# Patient Record
Sex: MALE | Race: ASIAN | NOT HISPANIC OR LATINO
[De-identification: names, ages, dates, MRNs, and addresses within clinical notes are randomized per-mention and may not be internally consistent; named-entity substitution may affect disease eponyms.]

---

## 2017-01-12 ENCOUNTER — APPOINTMENT (OUTPATIENT)
Dept: NEPHROLOGY | Facility: CLINIC | Age: 62
End: 2017-01-12

## 2017-01-12 VITALS
OXYGEN SATURATION: 98 % | DIASTOLIC BLOOD PRESSURE: 77 MMHG | WEIGHT: 167.71 LBS | SYSTOLIC BLOOD PRESSURE: 138 MMHG | HEART RATE: 66 BPM | BODY MASS INDEX: 24.84 KG/M2 | HEIGHT: 69 IN

## 2017-01-13 LAB
ALBUMIN SERPL ELPH-MCNC: 4 G/DL
ANION GAP SERPL CALC-SCNC: 19 MMOL/L
APPEARANCE: CLEAR
BACTERIA: NEGATIVE
BASOPHILS # BLD AUTO: 0.03 K/UL
BASOPHILS NFR BLD AUTO: 0.3 %
BILIRUBIN URINE: NEGATIVE
BLOOD URINE: NEGATIVE
BUN SERPL-MCNC: 61 MG/DL
CALCIUM SERPL-MCNC: 8.1 MG/DL
CHLORIDE SERPL-SCNC: 100 MMOL/L
CO2 SERPL-SCNC: 19 MMOL/L
COLOR: YELLOW
CREAT SERPL-MCNC: 4.39 MG/DL
CREAT SPEC-SCNC: 79 MG/DL
CREAT/PROT UR: 0.8 RATIO
EOSINOPHIL # BLD AUTO: 0.33 K/UL
EOSINOPHIL NFR BLD AUTO: 3.8 %
GLUCOSE QUALITATIVE U: NORMAL MG/DL
GLUCOSE SERPL-MCNC: 115 MG/DL
HCT VFR BLD CALC: 32.9 %
HGB BLD-MCNC: 10.3 G/DL
HYALINE CASTS: 1 /LPF
IMM GRANULOCYTES NFR BLD AUTO: 0.2 %
KETONES URINE: NEGATIVE
LEUKOCYTE ESTERASE URINE: NEGATIVE
LYMPHOCYTES # BLD AUTO: 3.06 K/UL
LYMPHOCYTES NFR BLD AUTO: 35.2 %
MAN DIFF?: NORMAL
MCHC RBC-ENTMCNC: 28.3 PG
MCHC RBC-ENTMCNC: 31.3 GM/DL
MCV RBC AUTO: 90.4 FL
MICROSCOPIC-UA: NORMAL
MONOCYTES # BLD AUTO: 0.55 K/UL
MONOCYTES NFR BLD AUTO: 6.3 %
NEUTROPHILS # BLD AUTO: 4.7 K/UL
NEUTROPHILS NFR BLD AUTO: 54.2 %
NITRITE URINE: NEGATIVE
PH URINE: 5.5
PHOSPHATE SERPL-MCNC: 4.3 MG/DL
PLATELET # BLD AUTO: 287 K/UL
POTASSIUM SERPL-SCNC: 4.3 MMOL/L
PROT UR-MCNC: 66 MG/DL
PROTEIN URINE: 30 MG/DL
RBC # BLD: 3.64 M/UL
RBC # FLD: 13.7 %
RED BLOOD CELLS URINE: 0 /HPF
SODIUM SERPL-SCNC: 138 MMOL/L
SPECIFIC GRAVITY URINE: 1.01
SQUAMOUS EPITHELIAL CELLS: 0 /HPF
UROBILINOGEN URINE: NORMAL MG/DL
WBC # FLD AUTO: 8.69 K/UL
WHITE BLOOD CELLS URINE: 0 /HPF

## 2017-02-23 ENCOUNTER — APPOINTMENT (OUTPATIENT)
Dept: CARDIOLOGY | Facility: CLINIC | Age: 62
End: 2017-02-23

## 2017-02-23 ENCOUNTER — NON-APPOINTMENT (OUTPATIENT)
Age: 62
End: 2017-02-23

## 2017-02-23 VITALS
OXYGEN SATURATION: 99 % | WEIGHT: 167 LBS | BODY MASS INDEX: 24.73 KG/M2 | TEMPERATURE: 97.9 F | DIASTOLIC BLOOD PRESSURE: 71 MMHG | SYSTOLIC BLOOD PRESSURE: 124 MMHG | RESPIRATION RATE: 12 BRPM | HEIGHT: 69 IN | HEART RATE: 66 BPM

## 2017-03-01 ENCOUNTER — OUTPATIENT (OUTPATIENT)
Dept: OUTPATIENT SERVICES | Facility: HOSPITAL | Age: 62
LOS: 1 days | End: 2017-03-01
Payer: COMMERCIAL

## 2017-03-01 ENCOUNTER — APPOINTMENT (OUTPATIENT)
Dept: CV DIAGNOSTICS | Facility: HOSPITAL | Age: 62
End: 2017-03-01

## 2017-03-01 DIAGNOSIS — I25.10 ATHEROSCLEROTIC HEART DISEASE OF NATIVE CORONARY ARTERY WITHOUT ANGINA PECTORIS: ICD-10-CM

## 2017-03-01 DIAGNOSIS — Z01.818 ENCOUNTER FOR OTHER PREPROCEDURAL EXAMINATION: ICD-10-CM

## 2017-03-01 PROCEDURE — 78452 HT MUSCLE IMAGE SPECT MULT: CPT | Mod: 26

## 2017-03-01 PROCEDURE — 93018 CV STRESS TEST I&R ONLY: CPT

## 2017-03-01 PROCEDURE — 93016 CV STRESS TEST SUPVJ ONLY: CPT

## 2017-03-09 ENCOUNTER — APPOINTMENT (OUTPATIENT)
Dept: NEPHROLOGY | Facility: CLINIC | Age: 62
End: 2017-03-09

## 2017-03-09 VITALS
HEIGHT: 69 IN | BODY MASS INDEX: 24.59 KG/M2 | OXYGEN SATURATION: 98 % | WEIGHT: 166 LBS | HEART RATE: 62 BPM | DIASTOLIC BLOOD PRESSURE: 78 MMHG | SYSTOLIC BLOOD PRESSURE: 160 MMHG

## 2017-03-13 ENCOUNTER — OUTPATIENT (OUTPATIENT)
Dept: OUTPATIENT SERVICES | Facility: HOSPITAL | Age: 62
LOS: 1 days | End: 2017-03-13
Payer: COMMERCIAL

## 2017-03-13 ENCOUNTER — LABORATORY RESULT (OUTPATIENT)
Age: 62
End: 2017-03-13

## 2017-03-13 ENCOUNTER — APPOINTMENT (OUTPATIENT)
Dept: TRANSPLANT | Facility: CLINIC | Age: 62
End: 2017-03-13

## 2017-03-13 ENCOUNTER — OUTPATIENT (OUTPATIENT)
Dept: OUTPATIENT SERVICES | Facility: HOSPITAL | Age: 62
LOS: 1 days | End: 2017-03-13

## 2017-03-13 ENCOUNTER — APPOINTMENT (OUTPATIENT)
Dept: NEPHROLOGY | Facility: CLINIC | Age: 62
End: 2017-03-13

## 2017-03-13 VITALS
WEIGHT: 165 LBS | SYSTOLIC BLOOD PRESSURE: 116 MMHG | BODY MASS INDEX: 24.37 KG/M2 | DIASTOLIC BLOOD PRESSURE: 72 MMHG | HEART RATE: 76 BPM | TEMPERATURE: 97.9 F | RESPIRATION RATE: 16 BRPM

## 2017-03-13 VITALS
SYSTOLIC BLOOD PRESSURE: 116 MMHG | RESPIRATION RATE: 18 BRPM | BODY MASS INDEX: 24.37 KG/M2 | WEIGHT: 165 LBS | DIASTOLIC BLOOD PRESSURE: 72 MMHG | OXYGEN SATURATION: 98 % | HEART RATE: 76 BPM

## 2017-03-13 DIAGNOSIS — N18.6 END STAGE RENAL DISEASE: ICD-10-CM

## 2017-03-13 LAB
ALBUMIN SERPL ELPH-MCNC: 4 G/DL — SIGNIFICANT CHANGE UP (ref 3.3–5)
ALP SERPL-CCNC: 100 U/L — SIGNIFICANT CHANGE UP (ref 40–120)
ALT FLD-CCNC: 9 U/L — LOW (ref 10–45)
ANION GAP SERPL CALC-SCNC: 15 MMOL/L — SIGNIFICANT CHANGE UP (ref 5–17)
AST SERPL-CCNC: 11 U/L — SIGNIFICANT CHANGE UP (ref 10–40)
BASOPHILS # BLD AUTO: 0.04 K/UL — SIGNIFICANT CHANGE UP (ref 0–0.2)
BASOPHILS NFR BLD AUTO: 0.5 % — SIGNIFICANT CHANGE UP (ref 0–2)
BILIRUB SERPL-MCNC: 0.2 MG/DL — SIGNIFICANT CHANGE UP (ref 0.2–1.2)
BUN SERPL-MCNC: 56 MG/DL — HIGH (ref 7–23)
CALCIUM SERPL-MCNC: 9.1 MG/DL — SIGNIFICANT CHANGE UP (ref 8.4–10.5)
CHLORIDE SERPL-SCNC: 104 MMOL/L — SIGNIFICANT CHANGE UP (ref 96–108)
CHOLEST SERPL-MCNC: 146 MG/DL — SIGNIFICANT CHANGE UP (ref 10–199)
CMV IGG FLD QL: 8.7 U/ML — HIGH
CMV IGG SERPL-IMP: POSITIVE
CO2 SERPL-SCNC: 20 MMOL/L — LOW (ref 22–31)
CREAT SERPL-MCNC: 4.45 MG/DL — HIGH (ref 0.5–1.3)
EOSINOPHIL # BLD AUTO: 0.29 K/UL — SIGNIFICANT CHANGE UP (ref 0–0.5)
EOSINOPHIL NFR BLD AUTO: 3.8 % — SIGNIFICANT CHANGE UP (ref 0–6)
GLUCOSE SERPL-MCNC: 178 MG/DL — HIGH (ref 70–99)
HAV IGG+IGM SER QL: REACTIVE
HBA1C BLD-MCNC: 7 % — HIGH (ref 4–5.6)
HCT VFR BLD CALC: 32.6 % — LOW (ref 39–50)
HDLC SERPL-MCNC: 43 MG/DL — SIGNIFICANT CHANGE UP (ref 40–125)
HGB BLD-MCNC: 10 G/DL — LOW (ref 13–17)
HSV1 IGG SER-ACNC: >62.2 INDEX — HIGH
HSV1 IGG SERPL QL IA: POSITIVE
HSV2 IGG FLD-ACNC: 0.07 INDEX — SIGNIFICANT CHANGE UP
HSV2 IGG SERPL QL IA: NEGATIVE — SIGNIFICANT CHANGE UP
IMM GRANULOCYTES NFR BLD AUTO: 0.3 % — SIGNIFICANT CHANGE UP (ref 0–1.5)
LDH SERPL L TO P-CCNC: 201 U/L — SIGNIFICANT CHANGE UP (ref 50–242)
LIPID PNL WITH DIRECT LDL SERPL: 79 MG/DL — SIGNIFICANT CHANGE UP
LYMPHOCYTES # BLD AUTO: 2.44 K/UL — SIGNIFICANT CHANGE UP (ref 1–3.3)
LYMPHOCYTES # BLD AUTO: 31.7 % — SIGNIFICANT CHANGE UP (ref 13–44)
MAGNESIUM SERPL-MCNC: 2 MG/DL — SIGNIFICANT CHANGE UP (ref 1.6–2.6)
MCHC RBC-ENTMCNC: 27.9 PG — SIGNIFICANT CHANGE UP (ref 27–34)
MCHC RBC-ENTMCNC: 30.7 GM/DL — LOW (ref 32–36)
MCV RBC AUTO: 91.1 FL — SIGNIFICANT CHANGE UP (ref 80–100)
MONOCYTES # BLD AUTO: 0.57 K/UL — SIGNIFICANT CHANGE UP (ref 0–0.9)
MONOCYTES NFR BLD AUTO: 7.4 % — SIGNIFICANT CHANGE UP (ref 2–14)
NEUTROPHILS # BLD AUTO: 4.33 K/UL — SIGNIFICANT CHANGE UP (ref 1.8–7.4)
NEUTROPHILS NFR BLD AUTO: 56.3 % — SIGNIFICANT CHANGE UP (ref 43–77)
PHOSPHATE SERPL-MCNC: 3.2 MG/DL — SIGNIFICANT CHANGE UP (ref 2.5–4.5)
PLATELET # BLD AUTO: 307 K/UL — SIGNIFICANT CHANGE UP (ref 150–400)
POTASSIUM SERPL-MCNC: 5.3 MMOL/L — SIGNIFICANT CHANGE UP (ref 3.5–5.3)
POTASSIUM SERPL-SCNC: 5.3 MMOL/L — SIGNIFICANT CHANGE UP (ref 3.5–5.3)
PROT SERPL-MCNC: 7.5 G/DL — SIGNIFICANT CHANGE UP (ref 6–8.3)
PSA FLD-MCNC: 1.68 NG/ML — SIGNIFICANT CHANGE UP (ref 0–4)
RBC # BLD: 3.58 M/UL — LOW (ref 4.2–5.8)
RBC # FLD: 14.1 % — SIGNIFICANT CHANGE UP (ref 10.3–14.5)
RUBV IGG SER-ACNC: 5.2 INDEX — SIGNIFICANT CHANGE UP
RUBV IGG SER-IMP: POSITIVE — SIGNIFICANT CHANGE UP
SODIUM SERPL-SCNC: 139 MMOL/L — SIGNIFICANT CHANGE UP (ref 135–145)
T GONDII IGG SER QL: <3 IU/ML — SIGNIFICANT CHANGE UP
T GONDII IGG SER QL: NEGATIVE — SIGNIFICANT CHANGE UP
TOTAL CHOLESTEROL/HDL RATIO MEASUREMENT: 3.4 RATIO — SIGNIFICANT CHANGE UP (ref 3.4–9.6)
TRIGL SERPL-MCNC: 121 MG/DL — SIGNIFICANT CHANGE UP (ref 10–149)
TSH SERPL-MCNC: 1.59 UIU/ML — SIGNIFICANT CHANGE UP (ref 0.27–4.2)
VZV IGG FLD QL IA: 2397 INDEX — SIGNIFICANT CHANGE UP
VZV IGG FLD QL IA: POSITIVE — SIGNIFICANT CHANGE UP
WBC # BLD: 7.69 K/UL — SIGNIFICANT CHANGE UP (ref 3.8–10.5)
WBC # FLD AUTO: 7.69 K/UL — SIGNIFICANT CHANGE UP (ref 3.8–10.5)

## 2017-03-13 PROCEDURE — 86664 EPSTEIN-BARR NUCLEAR ANTIGEN: CPT

## 2017-03-13 PROCEDURE — 87340 HEPATITIS B SURFACE AG IA: CPT

## 2017-03-13 PROCEDURE — 86592 SYPHILIS TEST NON-TREP QUAL: CPT

## 2017-03-13 PROCEDURE — 86787 VARICELLA-ZOSTER ANTIBODY: CPT

## 2017-03-13 PROCEDURE — 86704 HEP B CORE ANTIBODY TOTAL: CPT

## 2017-03-13 PROCEDURE — 87389 HIV-1 AG W/HIV-1&-2 AB AG IA: CPT

## 2017-03-13 PROCEDURE — 83036 HEMOGLOBIN GLYCOSYLATED A1C: CPT

## 2017-03-13 PROCEDURE — 86706 HEP B SURFACE ANTIBODY: CPT

## 2017-03-13 PROCEDURE — 80061 LIPID PANEL: CPT

## 2017-03-13 PROCEDURE — 86803 HEPATITIS C AB TEST: CPT

## 2017-03-13 PROCEDURE — 84443 ASSAY THYROID STIM HORMONE: CPT

## 2017-03-13 PROCEDURE — 86762 RUBELLA ANTIBODY: CPT

## 2017-03-13 PROCEDURE — 83735 ASSAY OF MAGNESIUM: CPT

## 2017-03-13 PROCEDURE — 86665 EPSTEIN-BARR CAPSID VCA: CPT

## 2017-03-13 PROCEDURE — 80053 COMPREHEN METABOLIC PANEL: CPT

## 2017-03-13 PROCEDURE — 86695 HERPES SIMPLEX TYPE 1 TEST: CPT

## 2017-03-13 PROCEDURE — 84100 ASSAY OF PHOSPHORUS: CPT

## 2017-03-13 PROCEDURE — 86777 TOXOPLASMA ANTIBODY: CPT

## 2017-03-13 PROCEDURE — 86708 HEPATITIS A ANTIBODY: CPT

## 2017-03-13 PROCEDURE — 85027 COMPLETE CBC AUTOMATED: CPT

## 2017-03-13 PROCEDURE — 83615 LACTATE (LD) (LDH) ENZYME: CPT

## 2017-03-13 PROCEDURE — 86696 HERPES SIMPLEX TYPE 2 TEST: CPT

## 2017-03-13 PROCEDURE — 86644 CMV ANTIBODY: CPT

## 2017-03-13 PROCEDURE — 86663 EPSTEIN-BARR ANTIBODY: CPT

## 2017-03-13 PROCEDURE — G0103: CPT

## 2017-03-14 LAB
EBV EA AB SER IA-ACNC: <5 U/ML — SIGNIFICANT CHANGE UP
EBV EA AB TITR SER IF: POSITIVE
EBV EA IGG SER-ACNC: NEGATIVE — SIGNIFICANT CHANGE UP
EBV NA IGG SER IA-ACNC: 490 U/ML — HIGH
EBV PATRN SPEC IB-IMP: SIGNIFICANT CHANGE UP
EBV VCA IGG AVIDITY SER QL IA: NEGATIVE — SIGNIFICANT CHANGE UP
EBV VCA IGM SER IA-ACNC: 11.5 U/ML — SIGNIFICANT CHANGE UP
EBV VCA IGM SER IA-ACNC: <10 U/ML — SIGNIFICANT CHANGE UP
EBV VCA IGM TITR FLD: NEGATIVE — SIGNIFICANT CHANGE UP
HBV CORE AB SER-ACNC: REACTIVE
HBV SURFACE AB SER-ACNC: >1000 MIU/ML — SIGNIFICANT CHANGE UP
HBV SURFACE AB SER-ACNC: REACTIVE
HBV SURFACE AG SER-ACNC: SIGNIFICANT CHANGE UP
HCV AB S/CO SERPL IA: 0.12 S/CO — SIGNIFICANT CHANGE UP
HCV AB SERPL-IMP: SIGNIFICANT CHANGE UP
HIV 1+2 AB+HIV1 P24 AG SERPL QL IA: SIGNIFICANT CHANGE UP

## 2017-03-15 LAB — RPR SERPL-ACNC: SIGNIFICANT CHANGE UP

## 2017-03-21 ENCOUNTER — FORM ENCOUNTER (OUTPATIENT)
Age: 62
End: 2017-03-21

## 2017-03-22 ENCOUNTER — APPOINTMENT (OUTPATIENT)
Dept: ULTRASOUND IMAGING | Facility: HOSPITAL | Age: 62
End: 2017-03-22

## 2017-03-22 ENCOUNTER — OUTPATIENT (OUTPATIENT)
Dept: OUTPATIENT SERVICES | Facility: HOSPITAL | Age: 62
LOS: 1 days | End: 2017-03-22
Payer: COMMERCIAL

## 2017-03-22 DIAGNOSIS — Z01.818 ENCOUNTER FOR OTHER PREPROCEDURAL EXAMINATION: ICD-10-CM

## 2017-03-22 PROCEDURE — 93880 EXTRACRANIAL BILAT STUDY: CPT

## 2017-03-22 PROCEDURE — 93978 VASCULAR STUDY: CPT

## 2017-03-22 PROCEDURE — 76700 US EXAM ABDOM COMPLETE: CPT

## 2017-04-05 ENCOUNTER — APPOINTMENT (OUTPATIENT)
Dept: NEPHROLOGY | Facility: CLINIC | Age: 62
End: 2017-04-05

## 2017-04-05 VITALS
HEIGHT: 69 IN | DIASTOLIC BLOOD PRESSURE: 77 MMHG | WEIGHT: 165 LBS | HEART RATE: 62 BPM | BODY MASS INDEX: 24.44 KG/M2 | OXYGEN SATURATION: 98 % | SYSTOLIC BLOOD PRESSURE: 169 MMHG

## 2017-04-12 ENCOUNTER — RECORD ABSTRACTING (OUTPATIENT)
Age: 62
End: 2017-04-12

## 2017-04-13 PROCEDURE — 93017 CV STRESS TEST TRACING ONLY: CPT

## 2017-04-13 PROCEDURE — A9500: CPT

## 2017-04-13 PROCEDURE — 78452 HT MUSCLE IMAGE SPECT MULT: CPT

## 2017-04-17 ENCOUNTER — MED ADMIN CHARGE (OUTPATIENT)
Age: 62
End: 2017-04-17

## 2017-04-21 ENCOUNTER — RX RENEWAL (OUTPATIENT)
Age: 62
End: 2017-04-21

## 2017-05-04 ENCOUNTER — APPOINTMENT (OUTPATIENT)
Dept: NEPHROLOGY | Facility: CLINIC | Age: 62
End: 2017-05-04

## 2017-05-04 VITALS
DIASTOLIC BLOOD PRESSURE: 76 MMHG | OXYGEN SATURATION: 96 % | BODY MASS INDEX: 24.44 KG/M2 | SYSTOLIC BLOOD PRESSURE: 143 MMHG | HEIGHT: 69 IN | HEART RATE: 67 BPM | WEIGHT: 165 LBS

## 2017-06-01 ENCOUNTER — APPOINTMENT (OUTPATIENT)
Dept: CARDIOLOGY | Facility: CLINIC | Age: 62
End: 2017-06-01

## 2017-06-01 ENCOUNTER — APPOINTMENT (OUTPATIENT)
Dept: NEPHROLOGY | Facility: CLINIC | Age: 62
End: 2017-06-01

## 2017-06-01 ENCOUNTER — NON-APPOINTMENT (OUTPATIENT)
Age: 62
End: 2017-06-01

## 2017-06-01 VITALS
HEART RATE: 67 BPM | HEIGHT: 69 IN | DIASTOLIC BLOOD PRESSURE: 69 MMHG | RESPIRATION RATE: 12 BRPM | SYSTOLIC BLOOD PRESSURE: 155 MMHG | TEMPERATURE: 98.4 F | BODY MASS INDEX: 25.33 KG/M2 | WEIGHT: 171 LBS | OXYGEN SATURATION: 98 %

## 2017-06-01 VITALS
OXYGEN SATURATION: 98 % | HEIGHT: 69 IN | BODY MASS INDEX: 25.47 KG/M2 | WEIGHT: 171.96 LBS | HEART RATE: 70 BPM | SYSTOLIC BLOOD PRESSURE: 143 MMHG | DIASTOLIC BLOOD PRESSURE: 70 MMHG

## 2017-06-01 VITALS — DIASTOLIC BLOOD PRESSURE: 78 MMHG | SYSTOLIC BLOOD PRESSURE: 140 MMHG

## 2017-06-02 LAB
ALBUMIN SERPL ELPH-MCNC: 4.1 G/DL
ANION GAP SERPL CALC-SCNC: 18 MMOL/L
BASOPHILS # BLD AUTO: 0.07 K/UL
BASOPHILS NFR BLD AUTO: 0.7 %
BUN SERPL-MCNC: 62 MG/DL
CALCIUM SERPL-MCNC: 8.3 MG/DL
CALCIUM SERPL-MCNC: 8.3 MG/DL
CHLORIDE SERPL-SCNC: 103 MMOL/L
CO2 SERPL-SCNC: 19 MMOL/L
CREAT SERPL-MCNC: 4.25 MG/DL
EOSINOPHIL # BLD AUTO: 0.54 K/UL
EOSINOPHIL NFR BLD AUTO: 5.6 %
FERRITIN SERPL-MCNC: 71 NG/ML
GLUCOSE SERPL-MCNC: 140 MG/DL
HCT VFR BLD CALC: 33.8 %
HGB BLD-MCNC: 10.3 G/DL
IMM GRANULOCYTES NFR BLD AUTO: 0.5 %
IRON SERPL-MCNC: 58 UG/DL
LYMPHOCYTES # BLD AUTO: 3.05 K/UL
LYMPHOCYTES NFR BLD AUTO: 31.7 %
MAN DIFF?: NORMAL
MCHC RBC-ENTMCNC: 28.1 PG
MCHC RBC-ENTMCNC: 30.5 GM/DL
MCV RBC AUTO: 92.1 FL
MONOCYTES # BLD AUTO: 0.44 K/UL
MONOCYTES NFR BLD AUTO: 4.6 %
NEUTROPHILS # BLD AUTO: 5.46 K/UL
NEUTROPHILS NFR BLD AUTO: 56.9 %
PARATHYROID HORMONE INTACT: 381 PG/ML
PHOSPHATE SERPL-MCNC: 3.7 MG/DL
PLATELET # BLD AUTO: 286 K/UL
POTASSIUM SERPL-SCNC: 4.8 MMOL/L
RBC # BLD: 3.67 M/UL
RBC # FLD: 13.8 %
SODIUM SERPL-SCNC: 140 MMOL/L
WBC # FLD AUTO: 9.61 K/UL

## 2017-06-29 ENCOUNTER — RX RENEWAL (OUTPATIENT)
Age: 62
End: 2017-06-29

## 2017-07-06 ENCOUNTER — APPOINTMENT (OUTPATIENT)
Dept: NEPHROLOGY | Facility: CLINIC | Age: 62
End: 2017-07-06

## 2017-07-06 VITALS
SYSTOLIC BLOOD PRESSURE: 168 MMHG | OXYGEN SATURATION: 99 % | BODY MASS INDEX: 25.31 KG/M2 | WEIGHT: 170.86 LBS | DIASTOLIC BLOOD PRESSURE: 77 MMHG | HEIGHT: 69 IN | HEART RATE: 71 BPM

## 2017-07-06 RX ADMIN — ERYTHROPOIETIN 0 UNIT/ML: 10000 INJECTION, SOLUTION INTRAVENOUS; SUBCUTANEOUS at 00:00

## 2017-08-09 ENCOUNTER — APPOINTMENT (OUTPATIENT)
Dept: NEPHROLOGY | Facility: CLINIC | Age: 62
End: 2017-08-09
Payer: COMMERCIAL

## 2017-08-09 VITALS
WEIGHT: 169 LBS | HEART RATE: 64 BPM | OXYGEN SATURATION: 99 % | BODY MASS INDEX: 25.03 KG/M2 | DIASTOLIC BLOOD PRESSURE: 77 MMHG | SYSTOLIC BLOOD PRESSURE: 155 MMHG | HEIGHT: 69 IN

## 2017-08-09 PROCEDURE — 96372 THER/PROPH/DIAG INJ SC/IM: CPT

## 2017-08-09 PROCEDURE — 99214 OFFICE O/P EST MOD 30 MIN: CPT | Mod: 25

## 2017-08-09 RX ORDER — ERYTHROPOIETIN 10000 [IU]/ML
10000 INJECTION, SOLUTION INTRAVENOUS; SUBCUTANEOUS
Qty: 1 | Refills: 0 | Status: COMPLETED | OUTPATIENT
Start: 2017-08-09

## 2017-08-09 RX ADMIN — ERYTHROPOIETIN 0 UNIT/ML: 10000 INJECTION, SOLUTION INTRAVENOUS; SUBCUTANEOUS at 00:00

## 2017-08-10 LAB
25(OH)D3 SERPL-MCNC: 33.2 NG/ML
ALBUMIN SERPL ELPH-MCNC: 4.1 G/DL
ANION GAP SERPL CALC-SCNC: 16 MMOL/L
APPEARANCE: CLEAR
BACTERIA: NEGATIVE
BASOPHILS # BLD AUTO: 0.03 K/UL
BASOPHILS NFR BLD AUTO: 0.4 %
BILIRUBIN URINE: NEGATIVE
BLOOD URINE: NEGATIVE
BUN SERPL-MCNC: 53 MG/DL
CALCIUM SERPL-MCNC: 8.7 MG/DL
CALCIUM SERPL-MCNC: 8.7 MG/DL
CHLORIDE SERPL-SCNC: 102 MMOL/L
CHOLEST SERPL-MCNC: 126 MG/DL
CHOLEST/HDLC SERPL: 3.2 RATIO
CO2 SERPL-SCNC: 19 MMOL/L
COLOR: YELLOW
CREAT SERPL-MCNC: 4.59 MG/DL
CREAT SPEC-SCNC: 100 MG/DL
CREAT/PROT UR: 1.6 RATIO
EOSINOPHIL # BLD AUTO: 0.29 K/UL
EOSINOPHIL NFR BLD AUTO: 3.6 %
GLUCOSE QUALITATIVE U: 250 MG/DL
GLUCOSE SERPL-MCNC: 218 MG/DL
HCT VFR BLD CALC: 32.5 %
HDLC SERPL-MCNC: 40 MG/DL
HGB BLD-MCNC: 10.6 G/DL
HYALINE CASTS: 1 /LPF
IMM GRANULOCYTES NFR BLD AUTO: 0.2 %
KETONES URINE: NEGATIVE
LDLC SERPL CALC-MCNC: 63 MG/DL
LEUKOCYTE ESTERASE URINE: NEGATIVE
LYMPHOCYTES # BLD AUTO: 2.11 K/UL
LYMPHOCYTES NFR BLD AUTO: 26 %
MAN DIFF?: NORMAL
MCHC RBC-ENTMCNC: 28.6 PG
MCHC RBC-ENTMCNC: 32.6 GM/DL
MCV RBC AUTO: 87.8 FL
MICROSCOPIC-UA: NORMAL
MONOCYTES # BLD AUTO: 0.57 K/UL
MONOCYTES NFR BLD AUTO: 7 %
NEUTROPHILS # BLD AUTO: 5.09 K/UL
NEUTROPHILS NFR BLD AUTO: 62.8 %
NITRITE URINE: NEGATIVE
PARATHYROID HORMONE INTACT: 511 PG/ML
PH URINE: 6
PHOSPHATE SERPL-MCNC: 4.5 MG/DL
PLATELET # BLD AUTO: 286 K/UL
POTASSIUM SERPL-SCNC: 5.3 MMOL/L
PROT UR-MCNC: 160 MG/DL
PROTEIN URINE: 300 MG/DL
RBC # BLD: 3.7 M/UL
RBC # FLD: 14 %
RED BLOOD CELLS URINE: 1 /HPF
SODIUM SERPL-SCNC: 137 MMOL/L
SPECIFIC GRAVITY URINE: 1.02
SQUAMOUS EPITHELIAL CELLS: 1 /HPF
TRIGL SERPL-MCNC: 114 MG/DL
UROBILINOGEN URINE: NORMAL MG/DL
WBC # FLD AUTO: 8.11 K/UL
WHITE BLOOD CELLS URINE: 1 /HPF

## 2017-10-05 ENCOUNTER — APPOINTMENT (OUTPATIENT)
Dept: CARDIOLOGY | Facility: CLINIC | Age: 62
End: 2017-10-05
Payer: COMMERCIAL

## 2017-10-05 ENCOUNTER — NON-APPOINTMENT (OUTPATIENT)
Age: 62
End: 2017-10-05

## 2017-10-05 VITALS
BODY MASS INDEX: 25.03 KG/M2 | HEIGHT: 69 IN | HEART RATE: 67 BPM | RESPIRATION RATE: 12 BRPM | SYSTOLIC BLOOD PRESSURE: 188 MMHG | DIASTOLIC BLOOD PRESSURE: 81 MMHG | WEIGHT: 169 LBS | OXYGEN SATURATION: 98 %

## 2017-10-05 VITALS — DIASTOLIC BLOOD PRESSURE: 80 MMHG | SYSTOLIC BLOOD PRESSURE: 155 MMHG

## 2017-10-05 PROCEDURE — 93000 ELECTROCARDIOGRAM COMPLETE: CPT

## 2017-10-05 PROCEDURE — 99214 OFFICE O/P EST MOD 30 MIN: CPT | Mod: 25

## 2017-11-09 ENCOUNTER — APPOINTMENT (OUTPATIENT)
Dept: NEPHROLOGY | Facility: CLINIC | Age: 62
End: 2017-11-09
Payer: COMMERCIAL

## 2017-11-09 VITALS
HEART RATE: 74 BPM | WEIGHT: 174.16 LBS | OXYGEN SATURATION: 99 % | BODY MASS INDEX: 25.8 KG/M2 | HEIGHT: 69 IN | SYSTOLIC BLOOD PRESSURE: 145 MMHG | DIASTOLIC BLOOD PRESSURE: 70 MMHG

## 2017-11-09 PROCEDURE — 99214 OFFICE O/P EST MOD 30 MIN: CPT

## 2017-11-10 LAB
ALBUMIN SERPL ELPH-MCNC: 4.1 G/DL
ANION GAP SERPL CALC-SCNC: 16 MMOL/L
APPEARANCE: CLEAR
BACTERIA: NEGATIVE
BASOPHILS # BLD AUTO: 0.05 K/UL
BASOPHILS NFR BLD AUTO: 0.6 %
BILIRUBIN URINE: NEGATIVE
BLOOD URINE: NEGATIVE
BUN SERPL-MCNC: 64 MG/DL
CALCIUM SERPL-MCNC: 8.4 MG/DL
CALCIUM SERPL-MCNC: 8.4 MG/DL
CHLORIDE SERPL-SCNC: 105 MMOL/L
CO2 SERPL-SCNC: 21 MMOL/L
COLOR: YELLOW
CREAT SERPL-MCNC: 4.67 MG/DL
CREAT SPEC-SCNC: 78 MG/DL
CREAT/PROT UR: 1.6 RATIO
EOSINOPHIL # BLD AUTO: 0.29 K/UL
EOSINOPHIL NFR BLD AUTO: 3.7 %
GLUCOSE QUALITATIVE U: NEGATIVE MG/DL
GLUCOSE SERPL-MCNC: 93 MG/DL
HCT VFR BLD CALC: 30.1 %
HGB BLD-MCNC: 9.7 G/DL
HYALINE CASTS: 1 /LPF
IMM GRANULOCYTES NFR BLD AUTO: 0.4 %
KETONES URINE: NEGATIVE
LEUKOCYTE ESTERASE URINE: NEGATIVE
LYMPHOCYTES # BLD AUTO: 2.52 K/UL
LYMPHOCYTES NFR BLD AUTO: 31.9 %
MAN DIFF?: NORMAL
MCHC RBC-ENTMCNC: 29.4 PG
MCHC RBC-ENTMCNC: 32.2 GM/DL
MCV RBC AUTO: 91.2 FL
MICROSCOPIC-UA: NORMAL
MONOCYTES # BLD AUTO: 0.42 K/UL
MONOCYTES NFR BLD AUTO: 5.3 %
NEUTROPHILS # BLD AUTO: 4.6 K/UL
NEUTROPHILS NFR BLD AUTO: 58.1 %
NITRITE URINE: NEGATIVE
PARATHYROID HORMONE INTACT: 487 PG/ML
PH URINE: 6
PHOSPHATE SERPL-MCNC: 4 MG/DL
PLATELET # BLD AUTO: 243 K/UL
POTASSIUM SERPL-SCNC: 4.4 MMOL/L
PROT UR-MCNC: 128 MG/DL
PROTEIN URINE: 100 MG/DL
RBC # BLD: 3.3 M/UL
RBC # FLD: 13.5 %
RED BLOOD CELLS URINE: 1 /HPF
SODIUM SERPL-SCNC: 142 MMOL/L
SPECIFIC GRAVITY URINE: 1.01
SQUAMOUS EPITHELIAL CELLS: 1 /HPF
UROBILINOGEN URINE: NEGATIVE MG/DL
WBC # FLD AUTO: 7.91 K/UL
WHITE BLOOD CELLS URINE: 0 /HPF

## 2017-12-21 ENCOUNTER — APPOINTMENT (OUTPATIENT)
Dept: NEPHROLOGY | Facility: CLINIC | Age: 62
End: 2017-12-21

## 2018-02-06 ENCOUNTER — APPOINTMENT (OUTPATIENT)
Dept: NEPHROLOGY | Facility: CLINIC | Age: 63
End: 2018-02-06
Payer: COMMERCIAL

## 2018-02-06 VITALS
HEIGHT: 69 IN | WEIGHT: 169.75 LBS | DIASTOLIC BLOOD PRESSURE: 74 MMHG | OXYGEN SATURATION: 99 % | SYSTOLIC BLOOD PRESSURE: 134 MMHG | BODY MASS INDEX: 25.14 KG/M2 | HEART RATE: 62 BPM

## 2018-02-06 PROCEDURE — 99214 OFFICE O/P EST MOD 30 MIN: CPT

## 2018-02-07 LAB
ALBUMIN SERPL ELPH-MCNC: 4.1 G/DL
ANION GAP SERPL CALC-SCNC: 16 MMOL/L
APPEARANCE: CLEAR
BACTERIA: NEGATIVE
BASOPHILS # BLD AUTO: 0.03 K/UL
BASOPHILS NFR BLD AUTO: 0.4 %
BILIRUBIN URINE: NEGATIVE
BLOOD URINE: NEGATIVE
BUN SERPL-MCNC: 59 MG/DL
CALCIUM SERPL-MCNC: 8.2 MG/DL
CALCIUM SERPL-MCNC: 8.2 MG/DL
CHLORIDE SERPL-SCNC: 102 MMOL/L
CO2 SERPL-SCNC: 22 MMOL/L
COLOR: YELLOW
CREAT SERPL-MCNC: 4.78 MG/DL
CREAT SPEC-SCNC: 56 MG/DL
CREAT/PROT UR: 2.1 RATIO
EOSINOPHIL # BLD AUTO: 0.38 K/UL
EOSINOPHIL NFR BLD AUTO: 5.2 %
FERRITIN SERPL-MCNC: 99 NG/ML
GLUCOSE QUALITATIVE U: 100 MG/DL
GLUCOSE SERPL-MCNC: 82 MG/DL
HCT VFR BLD CALC: 29.7 %
HGB BLD-MCNC: 9.4 G/DL
IMM GRANULOCYTES NFR BLD AUTO: 0.3 %
IRON SATN MFR SERPL: 22 %
IRON SERPL-MCNC: 50 UG/DL
KETONES URINE: NEGATIVE
LEUKOCYTE ESTERASE URINE: NEGATIVE
LYMPHOCYTES # BLD AUTO: 2.58 K/UL
LYMPHOCYTES NFR BLD AUTO: 35 %
MAN DIFF?: NORMAL
MCHC RBC-ENTMCNC: 28.8 PG
MCHC RBC-ENTMCNC: 31.6 GM/DL
MCV RBC AUTO: 91.1 FL
MICROSCOPIC-UA: NORMAL
MONOCYTES # BLD AUTO: 0.56 K/UL
MONOCYTES NFR BLD AUTO: 7.6 %
NEUTROPHILS # BLD AUTO: 3.8 K/UL
NEUTROPHILS NFR BLD AUTO: 51.5 %
NITRITE URINE: NEGATIVE
PARATHYROID HORMONE INTACT: 607 PG/ML
PH URINE: 6
PHOSPHATE SERPL-MCNC: 4.2 MG/DL
PLATELET # BLD AUTO: 250 K/UL
POTASSIUM SERPL-SCNC: 4.6 MMOL/L
PROT UR-MCNC: 117 MG/DL
PROTEIN URINE: 100 MG/DL
RBC # BLD: 3.26 M/UL
RBC # FLD: 13.5 %
RED BLOOD CELLS URINE: 0 /HPF
SODIUM SERPL-SCNC: 140 MMOL/L
SPECIFIC GRAVITY URINE: 1.01
SQUAMOUS EPITHELIAL CELLS: 0 /HPF
TIBC SERPL-MCNC: 224 UG/DL
UIBC SERPL-MCNC: 174 UG/DL
UROBILINOGEN URINE: NEGATIVE MG/DL
WBC # FLD AUTO: 7.37 K/UL
WHITE BLOOD CELLS URINE: 0 /HPF

## 2018-02-08 ENCOUNTER — NON-APPOINTMENT (OUTPATIENT)
Age: 63
End: 2018-02-08

## 2018-02-08 ENCOUNTER — APPOINTMENT (OUTPATIENT)
Dept: CARDIOLOGY | Facility: CLINIC | Age: 63
End: 2018-02-08
Payer: COMMERCIAL

## 2018-02-08 VITALS
HEIGHT: 69 IN | BODY MASS INDEX: 25.18 KG/M2 | SYSTOLIC BLOOD PRESSURE: 170 MMHG | HEART RATE: 65 BPM | DIASTOLIC BLOOD PRESSURE: 74 MMHG | WEIGHT: 170 LBS | RESPIRATION RATE: 12 BRPM | OXYGEN SATURATION: 99 %

## 2018-02-08 VITALS — SYSTOLIC BLOOD PRESSURE: 140 MMHG | DIASTOLIC BLOOD PRESSURE: 64 MMHG

## 2018-02-08 PROCEDURE — 93000 ELECTROCARDIOGRAM COMPLETE: CPT

## 2018-02-08 PROCEDURE — 99214 OFFICE O/P EST MOD 30 MIN: CPT | Mod: 25

## 2018-02-08 RX ORDER — AMOXICILLIN 250 MG/1
250 CAPSULE ORAL
Qty: 21 | Refills: 0 | Status: DISCONTINUED | COMMUNITY
Start: 2018-01-10

## 2018-02-15 ENCOUNTER — APPOINTMENT (OUTPATIENT)
Dept: NEPHROLOGY | Facility: CLINIC | Age: 63
End: 2018-02-15
Payer: COMMERCIAL

## 2018-02-15 VITALS
DIASTOLIC BLOOD PRESSURE: 66 MMHG | SYSTOLIC BLOOD PRESSURE: 126 MMHG | HEART RATE: 73 BPM | OXYGEN SATURATION: 99 % | BODY MASS INDEX: 25.33 KG/M2 | WEIGHT: 171 LBS | HEIGHT: 69 IN

## 2018-02-15 PROCEDURE — 96372 THER/PROPH/DIAG INJ SC/IM: CPT

## 2018-02-15 RX ORDER — ERYTHROPOIETIN 10000 [IU]/ML
10000 INJECTION, SOLUTION INTRAVENOUS; SUBCUTANEOUS
Qty: 1 | Refills: 0 | Status: COMPLETED | OUTPATIENT
Start: 2017-07-06

## 2018-03-01 ENCOUNTER — APPOINTMENT (OUTPATIENT)
Dept: NEPHROLOGY | Facility: CLINIC | Age: 63
End: 2018-03-01
Payer: COMMERCIAL

## 2018-03-01 VITALS
OXYGEN SATURATION: 98 % | HEIGHT: 69 IN | DIASTOLIC BLOOD PRESSURE: 61 MMHG | BODY MASS INDEX: 25.33 KG/M2 | HEART RATE: 68 BPM | WEIGHT: 171 LBS | SYSTOLIC BLOOD PRESSURE: 111 MMHG

## 2018-03-01 PROCEDURE — 96372 THER/PROPH/DIAG INJ SC/IM: CPT | Mod: GC

## 2018-03-01 RX ORDER — EPOETIN ALFA 10000 [IU]/ML
10000 SOLUTION INTRAVENOUS; SUBCUTANEOUS
Qty: 1 | Refills: 0 | Status: COMPLETED | OUTPATIENT
Start: 2018-03-01

## 2018-03-01 RX ADMIN — ERYTHROPOIETIN 0 UNIT/ML: 10000 INJECTION, SOLUTION INTRAVENOUS; SUBCUTANEOUS at 00:00

## 2018-04-24 ENCOUNTER — APPOINTMENT (OUTPATIENT)
Dept: NEPHROLOGY | Facility: CLINIC | Age: 63
End: 2018-04-24
Payer: COMMERCIAL

## 2018-04-24 VITALS
HEART RATE: 65 BPM | DIASTOLIC BLOOD PRESSURE: 71 MMHG | HEIGHT: 69 IN | OXYGEN SATURATION: 98 % | WEIGHT: 167.55 LBS | BODY MASS INDEX: 24.82 KG/M2 | SYSTOLIC BLOOD PRESSURE: 132 MMHG

## 2018-04-24 PROCEDURE — 96372 THER/PROPH/DIAG INJ SC/IM: CPT

## 2018-04-24 PROCEDURE — 99214 OFFICE O/P EST MOD 30 MIN: CPT | Mod: 25

## 2018-04-24 RX ADMIN — ERYTHROPOIETIN 0 UNIT/ML: 10000 INJECTION, SOLUTION INTRAVENOUS; SUBCUTANEOUS at 00:00

## 2018-04-25 LAB
ALBUMIN SERPL ELPH-MCNC: 3.9 G/DL
ANION GAP SERPL CALC-SCNC: 16 MMOL/L
APPEARANCE: CLEAR
BACTERIA: NEGATIVE
BASOPHILS # BLD AUTO: 0.03 K/UL
BASOPHILS NFR BLD AUTO: 0.4 %
BILIRUBIN URINE: NEGATIVE
BLOOD URINE: NEGATIVE
BUN SERPL-MCNC: 54 MG/DL
CALCIUM SERPL-MCNC: 7.9 MG/DL
CHLORIDE SERPL-SCNC: 103 MMOL/L
CO2 SERPL-SCNC: 20 MMOL/L
COLOR: YELLOW
CREAT SERPL-MCNC: 4.94 MG/DL
CREAT SPEC-SCNC: 47 MG/DL
CREAT/PROT UR: 3.2 RATIO
EOSINOPHIL # BLD AUTO: 0.27 K/UL
EOSINOPHIL NFR BLD AUTO: 3.7 %
FERRITIN SERPL-MCNC: 90 NG/ML
GLUCOSE QUALITATIVE U: NEGATIVE MG/DL
GLUCOSE SERPL-MCNC: 106 MG/DL
HCT VFR BLD CALC: 30.8 %
HGB BLD-MCNC: 9.5 G/DL
HYALINE CASTS: 0 /LPF
IMM GRANULOCYTES NFR BLD AUTO: 0.1 %
IRON SATN MFR SERPL: 24 %
IRON SERPL-MCNC: 52 UG/DL
KETONES URINE: NEGATIVE
LEUKOCYTE ESTERASE URINE: NEGATIVE
LYMPHOCYTES # BLD AUTO: 2.26 K/UL
LYMPHOCYTES NFR BLD AUTO: 30.8 %
MAN DIFF?: NORMAL
MCHC RBC-ENTMCNC: 28 PG
MCHC RBC-ENTMCNC: 30.8 GM/DL
MCV RBC AUTO: 90.9 FL
MICROSCOPIC-UA: NORMAL
MONOCYTES # BLD AUTO: 0.35 K/UL
MONOCYTES NFR BLD AUTO: 4.8 %
NEUTROPHILS # BLD AUTO: 4.42 K/UL
NEUTROPHILS NFR BLD AUTO: 60.2 %
NITRITE URINE: NEGATIVE
PH URINE: 6.5
PHOSPHATE SERPL-MCNC: 4.5 MG/DL
PLATELET # BLD AUTO: 253 K/UL
POTASSIUM SERPL-SCNC: 4.8 MMOL/L
PROT UR-MCNC: 151 MG/DL
PROTEIN URINE: 100 MG/DL
RBC # BLD: 3.39 M/UL
RBC # FLD: 13.7 %
RED BLOOD CELLS URINE: 1 /HPF
SODIUM SERPL-SCNC: 139 MMOL/L
SPECIFIC GRAVITY URINE: 1.01
SQUAMOUS EPITHELIAL CELLS: 0 /HPF
TIBC SERPL-MCNC: 217 UG/DL
UIBC SERPL-MCNC: 165 UG/DL
UROBILINOGEN URINE: NEGATIVE MG/DL
WBC # FLD AUTO: 7.34 K/UL
WHITE BLOOD CELLS URINE: 0 /HPF

## 2018-05-09 ENCOUNTER — APPOINTMENT (OUTPATIENT)
Dept: TRANSPLANT | Facility: CLINIC | Age: 63
End: 2018-05-09
Payer: COMMERCIAL

## 2018-05-09 ENCOUNTER — APPOINTMENT (OUTPATIENT)
Dept: NEPHROLOGY | Facility: CLINIC | Age: 63
End: 2018-05-09
Payer: COMMERCIAL

## 2018-05-09 VITALS — SYSTOLIC BLOOD PRESSURE: 170 MMHG | DIASTOLIC BLOOD PRESSURE: 70 MMHG

## 2018-05-09 VITALS
HEIGHT: 69 IN | TEMPERATURE: 98.4 F | SYSTOLIC BLOOD PRESSURE: 177 MMHG | BODY MASS INDEX: 24.88 KG/M2 | HEART RATE: 69 BPM | RESPIRATION RATE: 12 BRPM | WEIGHT: 168 LBS | OXYGEN SATURATION: 97 % | DIASTOLIC BLOOD PRESSURE: 70 MMHG

## 2018-05-09 PROCEDURE — 99215 OFFICE O/P EST HI 40 MIN: CPT

## 2018-05-21 ENCOUNTER — OUTPATIENT (OUTPATIENT)
Dept: OUTPATIENT SERVICES | Facility: HOSPITAL | Age: 63
LOS: 1 days | End: 2018-05-21
Payer: COMMERCIAL

## 2018-05-21 ENCOUNTER — APPOINTMENT (OUTPATIENT)
Dept: RADIOLOGY | Facility: IMAGING CENTER | Age: 63
End: 2018-05-21
Payer: COMMERCIAL

## 2018-05-21 DIAGNOSIS — Z01.818 ENCOUNTER FOR OTHER PREPROCEDURAL EXAMINATION: ICD-10-CM

## 2018-05-21 PROCEDURE — 71045 X-RAY EXAM CHEST 1 VIEW: CPT

## 2018-05-21 PROCEDURE — 71045 X-RAY EXAM CHEST 1 VIEW: CPT | Mod: 26

## 2018-06-06 ENCOUNTER — APPOINTMENT (OUTPATIENT)
Dept: CARDIOLOGY | Facility: CLINIC | Age: 63
End: 2018-06-06

## 2018-06-07 ENCOUNTER — RX RENEWAL (OUTPATIENT)
Age: 63
End: 2018-06-07

## 2018-06-11 ENCOUNTER — APPOINTMENT (OUTPATIENT)
Dept: CARDIOLOGY | Facility: CLINIC | Age: 63
End: 2018-06-11
Payer: COMMERCIAL

## 2018-06-11 ENCOUNTER — NON-APPOINTMENT (OUTPATIENT)
Age: 63
End: 2018-06-11

## 2018-06-11 VITALS
WEIGHT: 168 LBS | BODY MASS INDEX: 24.88 KG/M2 | HEART RATE: 61 BPM | OXYGEN SATURATION: 99 % | HEIGHT: 69 IN | TEMPERATURE: 98.2 F | SYSTOLIC BLOOD PRESSURE: 182 MMHG | RESPIRATION RATE: 12 BRPM | DIASTOLIC BLOOD PRESSURE: 82 MMHG

## 2018-06-11 VITALS — SYSTOLIC BLOOD PRESSURE: 145 MMHG | DIASTOLIC BLOOD PRESSURE: 78 MMHG

## 2018-06-11 PROCEDURE — 99214 OFFICE O/P EST MOD 30 MIN: CPT

## 2018-07-09 ENCOUNTER — MEDICATION RENEWAL (OUTPATIENT)
Age: 63
End: 2018-07-09

## 2018-07-09 ENCOUNTER — APPOINTMENT (OUTPATIENT)
Dept: NEPHROLOGY | Facility: CLINIC | Age: 63
End: 2018-07-09
Payer: COMMERCIAL

## 2018-07-09 ENCOUNTER — APPOINTMENT (OUTPATIENT)
Dept: CV DIAGNOSITCS | Facility: HOSPITAL | Age: 63
End: 2018-07-09

## 2018-07-09 ENCOUNTER — CLINICAL ADVICE (OUTPATIENT)
Age: 63
End: 2018-07-09

## 2018-07-09 ENCOUNTER — APPOINTMENT (OUTPATIENT)
Dept: CV DIAGNOSTICS | Facility: HOSPITAL | Age: 63
End: 2018-07-09

## 2018-07-09 VITALS
HEART RATE: 64 BPM | WEIGHT: 165.34 LBS | OXYGEN SATURATION: 98 % | HEIGHT: 69 IN | DIASTOLIC BLOOD PRESSURE: 68 MMHG | BODY MASS INDEX: 24.49 KG/M2 | SYSTOLIC BLOOD PRESSURE: 135 MMHG

## 2018-07-09 PROCEDURE — 96372 THER/PROPH/DIAG INJ SC/IM: CPT

## 2018-07-10 LAB
ALBUMIN SERPL ELPH-MCNC: 3.8 G/DL
ANION GAP SERPL CALC-SCNC: 16 MMOL/L
APPEARANCE: CLEAR
BACTERIA: NEGATIVE
BASOPHILS # BLD AUTO: 0.03 K/UL
BASOPHILS NFR BLD AUTO: 0.4 %
BILIRUBIN URINE: NEGATIVE
BLOOD URINE: NEGATIVE
BUN SERPL-MCNC: 65 MG/DL
CALCIUM SERPL-MCNC: 7.5 MG/DL
CHLORIDE SERPL-SCNC: 108 MMOL/L
CO2 SERPL-SCNC: 18 MMOL/L
COLOR: YELLOW
CREAT SERPL-MCNC: 5.18 MG/DL
CREAT SPEC-SCNC: 117 MG/DL
CREAT/PROT UR: 1.8 RATIO
EOSINOPHIL # BLD AUTO: 0.25 K/UL
EOSINOPHIL NFR BLD AUTO: 3.2 %
GLUCOSE QUALITATIVE U: NEGATIVE MG/DL
GLUCOSE SERPL-MCNC: 104 MG/DL
HCT VFR BLD CALC: 27.9 %
HGB BLD-MCNC: 9.1 G/DL
IMM GRANULOCYTES NFR BLD AUTO: 0.3 %
KETONES URINE: NEGATIVE
LEUKOCYTE ESTERASE URINE: NEGATIVE
LYMPHOCYTES # BLD AUTO: 1.7 K/UL
LYMPHOCYTES NFR BLD AUTO: 21.8 %
MAN DIFF?: NORMAL
MCHC RBC-ENTMCNC: 29 PG
MCHC RBC-ENTMCNC: 32.6 GM/DL
MCV RBC AUTO: 88.9 FL
MICROSCOPIC-UA: NORMAL
MONOCYTES # BLD AUTO: 0.6 K/UL
MONOCYTES NFR BLD AUTO: 7.7 %
NEUTROPHILS # BLD AUTO: 5.19 K/UL
NEUTROPHILS NFR BLD AUTO: 66.6 %
NITRITE URINE: NEGATIVE
PH URINE: 5.5
PHOSPHATE SERPL-MCNC: 4.5 MG/DL
PLATELET # BLD AUTO: 245 K/UL
POTASSIUM SERPL-SCNC: 4.8 MMOL/L
PROT UR-MCNC: 208 MG/DL
PROTEIN URINE: 300 MG/DL
RBC # BLD: 3.14 M/UL
RBC # FLD: 14.1 %
RED BLOOD CELLS URINE: 0 /HPF
SODIUM SERPL-SCNC: 142 MMOL/L
SPECIFIC GRAVITY URINE: 1.01
SQUAMOUS EPITHELIAL CELLS: 1 /HPF
UROBILINOGEN URINE: NEGATIVE MG/DL
WBC # FLD AUTO: 7.79 K/UL
WHITE BLOOD CELLS URINE: 2 /HPF

## 2018-07-18 ENCOUNTER — RX RENEWAL (OUTPATIENT)
Age: 63
End: 2018-07-18

## 2018-07-23 ENCOUNTER — APPOINTMENT (OUTPATIENT)
Dept: NEPHROLOGY | Facility: CLINIC | Age: 63
End: 2018-07-23
Payer: COMMERCIAL

## 2018-07-23 VITALS
OXYGEN SATURATION: 98 % | HEIGHT: 69 IN | SYSTOLIC BLOOD PRESSURE: 156 MMHG | HEART RATE: 65 BPM | DIASTOLIC BLOOD PRESSURE: 79 MMHG | BODY MASS INDEX: 24.33 KG/M2 | WEIGHT: 164.24 LBS

## 2018-07-23 PROCEDURE — 96372 THER/PROPH/DIAG INJ SC/IM: CPT

## 2018-07-23 RX ADMIN — ERYTHROPOIETIN 1 UNIT/ML: 10000 INJECTION, SOLUTION INTRAVENOUS; SUBCUTANEOUS at 00:00

## 2018-08-01 ENCOUNTER — APPOINTMENT (OUTPATIENT)
Dept: NEPHROLOGY | Facility: CLINIC | Age: 63
End: 2018-08-01

## 2018-08-06 ENCOUNTER — APPOINTMENT (OUTPATIENT)
Dept: NEPHROLOGY | Facility: CLINIC | Age: 63
End: 2018-08-06
Payer: COMMERCIAL

## 2018-08-06 VITALS
WEIGHT: 159.83 LBS | SYSTOLIC BLOOD PRESSURE: 154 MMHG | HEART RATE: 66 BPM | DIASTOLIC BLOOD PRESSURE: 75 MMHG | OXYGEN SATURATION: 98 % | BODY MASS INDEX: 23.67 KG/M2 | HEIGHT: 69 IN

## 2018-08-06 PROCEDURE — 96372 THER/PROPH/DIAG INJ SC/IM: CPT

## 2018-08-20 ENCOUNTER — APPOINTMENT (OUTPATIENT)
Dept: NEPHROLOGY | Facility: CLINIC | Age: 63
End: 2018-08-20
Payer: COMMERCIAL

## 2018-08-20 VITALS
OXYGEN SATURATION: 98 % | WEIGHT: 158.73 LBS | BODY MASS INDEX: 23.51 KG/M2 | HEIGHT: 69 IN | HEART RATE: 64 BPM | SYSTOLIC BLOOD PRESSURE: 171 MMHG | DIASTOLIC BLOOD PRESSURE: 91 MMHG

## 2018-08-20 PROCEDURE — 96372 THER/PROPH/DIAG INJ SC/IM: CPT

## 2018-08-22 ENCOUNTER — APPOINTMENT (OUTPATIENT)
Dept: CARDIOLOGY | Facility: CLINIC | Age: 63
End: 2018-08-22
Payer: COMMERCIAL

## 2018-08-22 VITALS
DIASTOLIC BLOOD PRESSURE: 78 MMHG | HEIGHT: 69 IN | WEIGHT: 163 LBS | SYSTOLIC BLOOD PRESSURE: 183 MMHG | RESPIRATION RATE: 12 BRPM | HEART RATE: 73 BPM | BODY MASS INDEX: 24.14 KG/M2 | OXYGEN SATURATION: 100 %

## 2018-08-22 PROCEDURE — A9500: CPT

## 2018-08-22 PROCEDURE — 93015 CV STRESS TEST SUPVJ I&R: CPT

## 2018-08-22 PROCEDURE — 99215 OFFICE O/P EST HI 40 MIN: CPT

## 2018-08-22 PROCEDURE — 78452 HT MUSCLE IMAGE SPECT MULT: CPT

## 2018-08-22 PROCEDURE — 93306 TTE W/DOPPLER COMPLETE: CPT

## 2018-09-06 ENCOUNTER — APPOINTMENT (OUTPATIENT)
Dept: NEPHROLOGY | Facility: CLINIC | Age: 63
End: 2018-09-06
Payer: COMMERCIAL

## 2018-09-06 VITALS
WEIGHT: 162 LBS | SYSTOLIC BLOOD PRESSURE: 155 MMHG | DIASTOLIC BLOOD PRESSURE: 79 MMHG | BODY MASS INDEX: 23.99 KG/M2 | OXYGEN SATURATION: 98 % | HEART RATE: 71 BPM | HEIGHT: 69 IN

## 2018-09-06 PROCEDURE — 96372 THER/PROPH/DIAG INJ SC/IM: CPT

## 2018-09-06 RX ADMIN — ERYTHROPOIETIN 1 UNIT/ML: 10000 INJECTION, SOLUTION INTRAVENOUS; SUBCUTANEOUS at 00:00

## 2018-09-10 ENCOUNTER — OUTPATIENT (OUTPATIENT)
Dept: OUTPATIENT SERVICES | Facility: HOSPITAL | Age: 63
LOS: 1 days | End: 2018-09-10
Payer: COMMERCIAL

## 2018-09-10 VITALS
DIASTOLIC BLOOD PRESSURE: 83 MMHG | WEIGHT: 164.02 LBS | RESPIRATION RATE: 16 BRPM | HEART RATE: 65 BPM | HEIGHT: 71 IN | OXYGEN SATURATION: 100 % | TEMPERATURE: 98 F | SYSTOLIC BLOOD PRESSURE: 176 MMHG

## 2018-09-10 DIAGNOSIS — R94.39 ABNORMAL RESULT OF OTHER CARDIOVASCULAR FUNCTION STUDY: ICD-10-CM

## 2018-09-10 LAB
ALBUMIN SERPL ELPH-MCNC: 4.1 G/DL — SIGNIFICANT CHANGE UP (ref 3.3–5)
ALP SERPL-CCNC: 138 U/L — HIGH (ref 40–120)
ALT FLD-CCNC: 12 U/L — SIGNIFICANT CHANGE UP (ref 10–45)
ANION GAP SERPL CALC-SCNC: 14 MMOL/L — SIGNIFICANT CHANGE UP (ref 5–17)
AST SERPL-CCNC: 16 U/L — SIGNIFICANT CHANGE UP (ref 10–40)
BILIRUB SERPL-MCNC: 0.2 MG/DL — SIGNIFICANT CHANGE UP (ref 0.2–1.2)
BUN SERPL-MCNC: 58 MG/DL — HIGH (ref 7–23)
CALCIUM SERPL-MCNC: 7.6 MG/DL — LOW (ref 8.4–10.5)
CHLORIDE SERPL-SCNC: 103 MMOL/L — SIGNIFICANT CHANGE UP (ref 96–108)
CO2 SERPL-SCNC: 20 MMOL/L — LOW (ref 22–31)
CREAT SERPL-MCNC: 5.08 MG/DL — HIGH (ref 0.5–1.3)
GLUCOSE SERPL-MCNC: 104 MG/DL — HIGH (ref 70–99)
HCT VFR BLD CALC: 36.6 % — LOW (ref 39–50)
HGB BLD-MCNC: 12 G/DL — LOW (ref 13–17)
MCHC RBC-ENTMCNC: 29.7 PG — SIGNIFICANT CHANGE UP (ref 27–34)
MCHC RBC-ENTMCNC: 32.9 GM/DL — SIGNIFICANT CHANGE UP (ref 32–36)
MCV RBC AUTO: 90.4 FL — SIGNIFICANT CHANGE UP (ref 80–100)
PLATELET # BLD AUTO: 237 K/UL — SIGNIFICANT CHANGE UP (ref 150–400)
POTASSIUM SERPL-MCNC: 4.1 MMOL/L — SIGNIFICANT CHANGE UP (ref 3.5–5.3)
POTASSIUM SERPL-SCNC: 4.1 MMOL/L — SIGNIFICANT CHANGE UP (ref 3.5–5.3)
PROT SERPL-MCNC: 7.8 G/DL — SIGNIFICANT CHANGE UP (ref 6–8.3)
RBC # BLD: 4.05 M/UL — LOW (ref 4.2–5.8)
RBC # FLD: 13.2 % — SIGNIFICANT CHANGE UP (ref 10.3–14.5)
SODIUM SERPL-SCNC: 137 MMOL/L — SIGNIFICANT CHANGE UP (ref 135–145)
WBC # BLD: 8.2 K/UL — SIGNIFICANT CHANGE UP (ref 3.8–10.5)
WBC # FLD AUTO: 8.2 K/UL — SIGNIFICANT CHANGE UP (ref 3.8–10.5)

## 2018-09-10 PROCEDURE — 99152 MOD SED SAME PHYS/QHP 5/>YRS: CPT

## 2018-09-10 PROCEDURE — 85027 COMPLETE CBC AUTOMATED: CPT

## 2018-09-10 PROCEDURE — 80053 COMPREHEN METABOLIC PANEL: CPT

## 2018-09-10 PROCEDURE — C1887: CPT

## 2018-09-10 PROCEDURE — C1760: CPT

## 2018-09-10 PROCEDURE — 93454 CORONARY ARTERY ANGIO S&I: CPT

## 2018-09-10 PROCEDURE — 93005 ELECTROCARDIOGRAM TRACING: CPT

## 2018-09-10 PROCEDURE — C1894: CPT

## 2018-09-10 PROCEDURE — 93010 ELECTROCARDIOGRAM REPORT: CPT

## 2018-09-10 PROCEDURE — C1769: CPT

## 2018-09-10 PROCEDURE — 93454 CORONARY ARTERY ANGIO S&I: CPT | Mod: 26

## 2018-09-10 RX ORDER — SODIUM CHLORIDE 9 MG/ML
1000 INJECTION INTRAMUSCULAR; INTRAVENOUS; SUBCUTANEOUS
Qty: 0 | Refills: 0 | Status: DISCONTINUED | OUTPATIENT
Start: 2018-09-10 | End: 2018-09-25

## 2018-09-10 RX ORDER — INSULIN ASPART 100 [IU]/ML
7 INJECTION, SOLUTION SUBCUTANEOUS
Qty: 0 | Refills: 0 | COMMUNITY

## 2018-09-10 NOTE — H&P CARDIOLOGY - HISTORY OF PRESENT ILLNESS
62 year old  male with pmhx of CAD with PCI of OM1,  HTN, CKD IV, type 2 DM, NILO on CPAP presents for cardiac cath for clearance to be put on renal transplant list. The patient does admit to feeling chronically fatigued when he performs minimal activity such as walking 2 blocks. He states he has felt this way for over 1 year. The patient underwent a nuclear stress test which was significant for 'moderate to severe defects of the mid to distal anterior wall and anteroapical walls that were reversible.' The patient has been evaluated by his nephrologist Dr. Cardenas who recommended fluids pre and post cath, while holding his lasix and bicarb. The patient currently denies shortness of breath, dizziness, orthopnea or syncope.

## 2018-09-10 NOTE — H&P CARDIOLOGY - PMH
CAD (Coronary Artery Disease)  with Stents in 06/2009  CAD (Coronary Artery Disease)    CRI (Chronic Renal Insufficiency)    Diabetes    Dyslipidemia    Hypertension

## 2018-09-17 ENCOUNTER — APPOINTMENT (OUTPATIENT)
Dept: NEPHROLOGY | Facility: CLINIC | Age: 63
End: 2018-09-17
Payer: COMMERCIAL

## 2018-09-17 VITALS
DIASTOLIC BLOOD PRESSURE: 77 MMHG | SYSTOLIC BLOOD PRESSURE: 158 MMHG | OXYGEN SATURATION: 98 % | WEIGHT: 165.34 LBS | HEIGHT: 69 IN | BODY MASS INDEX: 24.49 KG/M2 | HEART RATE: 62 BPM

## 2018-09-17 PROCEDURE — 96372 THER/PROPH/DIAG INJ SC/IM: CPT

## 2018-09-17 RX ADMIN — ERYTHROPOIETIN 1 UNIT/ML: 10000 INJECTION, SOLUTION INTRAVENOUS; SUBCUTANEOUS at 00:00

## 2018-10-01 ENCOUNTER — APPOINTMENT (OUTPATIENT)
Dept: NEPHROLOGY | Facility: CLINIC | Age: 63
End: 2018-10-01
Payer: COMMERCIAL

## 2018-10-01 VITALS
HEART RATE: 61 BPM | SYSTOLIC BLOOD PRESSURE: 167 MMHG | BODY MASS INDEX: 24.16 KG/M2 | OXYGEN SATURATION: 99 % | HEIGHT: 69 IN | DIASTOLIC BLOOD PRESSURE: 79 MMHG | WEIGHT: 163.14 LBS

## 2018-10-01 PROCEDURE — 96372 THER/PROPH/DIAG INJ SC/IM: CPT

## 2018-10-01 RX ADMIN — ERYTHROPOIETIN 1 UNIT/ML: 10000 INJECTION, SOLUTION INTRAVENOUS; SUBCUTANEOUS at 00:00

## 2018-10-03 ENCOUNTER — APPOINTMENT (OUTPATIENT)
Dept: CARDIOLOGY | Facility: CLINIC | Age: 63
End: 2018-10-03
Payer: COMMERCIAL

## 2018-10-03 ENCOUNTER — NON-APPOINTMENT (OUTPATIENT)
Age: 63
End: 2018-10-03

## 2018-10-03 VITALS
OXYGEN SATURATION: 99 % | BODY MASS INDEX: 24.44 KG/M2 | DIASTOLIC BLOOD PRESSURE: 71 MMHG | TEMPERATURE: 98.8 F | RESPIRATION RATE: 12 BRPM | HEIGHT: 69 IN | WEIGHT: 165 LBS | SYSTOLIC BLOOD PRESSURE: 171 MMHG | HEART RATE: 68 BPM

## 2018-10-03 VITALS — DIASTOLIC BLOOD PRESSURE: 65 MMHG | SYSTOLIC BLOOD PRESSURE: 150 MMHG

## 2018-10-03 PROCEDURE — 99214 OFFICE O/P EST MOD 30 MIN: CPT | Mod: 25

## 2018-10-31 ENCOUNTER — APPOINTMENT (OUTPATIENT)
Dept: NEPHROLOGY | Facility: CLINIC | Age: 63
End: 2018-10-31
Payer: COMMERCIAL

## 2018-10-31 VITALS — DIASTOLIC BLOOD PRESSURE: 80 MMHG | SYSTOLIC BLOOD PRESSURE: 140 MMHG

## 2018-10-31 VITALS
HEART RATE: 61 BPM | DIASTOLIC BLOOD PRESSURE: 94 MMHG | HEIGHT: 69 IN | BODY MASS INDEX: 24.49 KG/M2 | SYSTOLIC BLOOD PRESSURE: 175 MMHG | WEIGHT: 165.35 LBS | OXYGEN SATURATION: 99 %

## 2018-10-31 PROCEDURE — 99213 OFFICE O/P EST LOW 20 MIN: CPT | Mod: 25

## 2018-10-31 PROCEDURE — 96372 THER/PROPH/DIAG INJ SC/IM: CPT

## 2018-10-31 RX ORDER — OMEPRAZOLE 20 MG/1
20 CAPSULE, DELAYED RELEASE ORAL
Refills: 0 | Status: DISCONTINUED | COMMUNITY
Start: 2018-05-09 | End: 2018-10-31

## 2018-11-01 LAB
ALBUMIN SERPL ELPH-MCNC: 4.2 G/DL
ANION GAP SERPL CALC-SCNC: 17 MMOL/L
APPEARANCE: CLEAR
BACTERIA: NEGATIVE
BASOPHILS # BLD AUTO: 0.04 K/UL
BASOPHILS NFR BLD AUTO: 0.5 %
BILIRUBIN URINE: NEGATIVE
BLOOD URINE: NEGATIVE
BUN SERPL-MCNC: 54 MG/DL
CALCIUM SERPL-MCNC: 8.1 MG/DL
CHLORIDE SERPL-SCNC: 102 MMOL/L
CO2 SERPL-SCNC: 20 MMOL/L
COLOR: YELLOW
CREAT SERPL-MCNC: 5.14 MG/DL
CREAT SPEC-SCNC: 37 MG/DL
CREAT/PROT UR: 4.8 RATIO
EOSINOPHIL # BLD AUTO: 0.42 K/UL
EOSINOPHIL NFR BLD AUTO: 4.9 %
FERRITIN SERPL-MCNC: 102 NG/ML
GLUCOSE QUALITATIVE U: 100 MG/DL
GLUCOSE SERPL-MCNC: 104 MG/DL
HCT VFR BLD CALC: 35.9 %
HGB BLD-MCNC: 11.3 G/DL
HYALINE CASTS: 0 /LPF
IMM GRANULOCYTES NFR BLD AUTO: 0.2 %
IRON SATN MFR SERPL: 32 %
IRON SERPL-MCNC: 77 UG/DL
KETONES URINE: NEGATIVE
LEUKOCYTE ESTERASE URINE: NEGATIVE
LYMPHOCYTES # BLD AUTO: 2.51 K/UL
LYMPHOCYTES NFR BLD AUTO: 29.2 %
MAN DIFF?: NORMAL
MCHC RBC-ENTMCNC: 27.2 PG
MCHC RBC-ENTMCNC: 31.5 GM/DL
MCV RBC AUTO: 86.5 FL
MICROSCOPIC-UA: NORMAL
MONOCYTES # BLD AUTO: 0.65 K/UL
MONOCYTES NFR BLD AUTO: 7.6 %
NEUTROPHILS # BLD AUTO: 4.95 K/UL
NEUTROPHILS NFR BLD AUTO: 57.6 %
NITRITE URINE: NEGATIVE
PH URINE: 7
PHOSPHATE SERPL-MCNC: 4.7 MG/DL
PLATELET # BLD AUTO: 242 K/UL
POTASSIUM SERPL-SCNC: 5.4 MMOL/L
PROT UR-MCNC: 177 MG/DL
PROTEIN URINE: 300 MG/DL
RBC # BLD: 4.15 M/UL
RBC # FLD: 14.2 %
RED BLOOD CELLS URINE: 1 /HPF
SODIUM SERPL-SCNC: 139 MMOL/L
SPECIFIC GRAVITY URINE: 1.01
SQUAMOUS EPITHELIAL CELLS: 0 /HPF
TIBC SERPL-MCNC: 244 UG/DL
UIBC SERPL-MCNC: 167 UG/DL
UROBILINOGEN URINE: NEGATIVE MG/DL
WBC # FLD AUTO: 8.59 K/UL
WHITE BLOOD CELLS URINE: 0 /HPF

## 2018-11-21 ENCOUNTER — APPOINTMENT (OUTPATIENT)
Dept: CARDIOLOGY | Facility: CLINIC | Age: 63
End: 2018-11-21

## 2018-11-29 ENCOUNTER — APPOINTMENT (OUTPATIENT)
Dept: NEPHROLOGY | Facility: CLINIC | Age: 63
End: 2018-11-29
Payer: COMMERCIAL

## 2018-11-29 VITALS
SYSTOLIC BLOOD PRESSURE: 123 MMHG | DIASTOLIC BLOOD PRESSURE: 60 MMHG | BODY MASS INDEX: 24.88 KG/M2 | HEART RATE: 82 BPM | HEIGHT: 69 IN | WEIGHT: 168 LBS | OXYGEN SATURATION: 98 %

## 2018-11-29 PROCEDURE — 96372 THER/PROPH/DIAG INJ SC/IM: CPT

## 2018-12-07 ENCOUNTER — EMERGENCY (EMERGENCY)
Facility: HOSPITAL | Age: 63
LOS: 1 days | Discharge: HOME | End: 2018-12-07
Attending: EMERGENCY MEDICINE | Admitting: EMERGENCY MEDICINE
Payer: COMMERCIAL

## 2018-12-07 VITALS
OXYGEN SATURATION: 100 % | RESPIRATION RATE: 16 BRPM | TEMPERATURE: 98 F | DIASTOLIC BLOOD PRESSURE: 61 MMHG | HEART RATE: 71 BPM | SYSTOLIC BLOOD PRESSURE: 180 MMHG

## 2018-12-07 VITALS
RESPIRATION RATE: 18 BRPM | TEMPERATURE: 97 F | HEART RATE: 75 BPM | SYSTOLIC BLOOD PRESSURE: 206 MMHG | OXYGEN SATURATION: 98 % | DIASTOLIC BLOOD PRESSURE: 100 MMHG

## 2018-12-07 LAB
ALBUMIN SERPL ELPH-MCNC: 3.3 G/DL — SIGNIFICANT CHANGE UP (ref 3.3–5)
ALP SERPL-CCNC: 104 U/L — SIGNIFICANT CHANGE UP (ref 40–120)
ALT FLD-CCNC: 10 U/L — SIGNIFICANT CHANGE UP (ref 4–41)
AST SERPL-CCNC: 15 U/L — SIGNIFICANT CHANGE UP (ref 4–40)
BASOPHILS # BLD AUTO: 0.03 K/UL — SIGNIFICANT CHANGE UP (ref 0–0.2)
BASOPHILS NFR BLD AUTO: 0.3 % — SIGNIFICANT CHANGE UP (ref 0–2)
BILIRUB SERPL-MCNC: 0.2 MG/DL — SIGNIFICANT CHANGE UP (ref 0.2–1.2)
BUN SERPL-MCNC: 46 MG/DL — HIGH (ref 7–23)
CALCIUM SERPL-MCNC: 7.2 MG/DL — LOW (ref 8.4–10.5)
CHLORIDE SERPL-SCNC: 105 MMOL/L — SIGNIFICANT CHANGE UP (ref 98–107)
CO2 SERPL-SCNC: 19 MMOL/L — LOW (ref 22–31)
CREAT SERPL-MCNC: 4.98 MG/DL — HIGH (ref 0.5–1.3)
EOSINOPHIL # BLD AUTO: 0.08 K/UL — SIGNIFICANT CHANGE UP (ref 0–0.5)
EOSINOPHIL NFR BLD AUTO: 0.9 % — SIGNIFICANT CHANGE UP (ref 0–6)
GLUCOSE SERPL-MCNC: 70 MG/DL — SIGNIFICANT CHANGE UP (ref 70–99)
HCT VFR BLD CALC: 34.1 % — LOW (ref 39–50)
HGB BLD-MCNC: 10.5 G/DL — LOW (ref 13–17)
IMM GRANULOCYTES # BLD AUTO: 0.07 # — SIGNIFICANT CHANGE UP
IMM GRANULOCYTES NFR BLD AUTO: 0.7 % — SIGNIFICANT CHANGE UP (ref 0–1.5)
LYMPHOCYTES # BLD AUTO: 0.71 K/UL — LOW (ref 1–3.3)
LYMPHOCYTES # BLD AUTO: 7.6 % — LOW (ref 13–44)
MAGNESIUM SERPL-MCNC: 1.9 MG/DL — SIGNIFICANT CHANGE UP (ref 1.6–2.6)
MCHC RBC-ENTMCNC: 28.2 PG — SIGNIFICANT CHANGE UP (ref 27–34)
MCHC RBC-ENTMCNC: 30.8 % — LOW (ref 32–36)
MCV RBC AUTO: 91.4 FL — SIGNIFICANT CHANGE UP (ref 80–100)
MONOCYTES # BLD AUTO: 0.55 K/UL — SIGNIFICANT CHANGE UP (ref 0–0.9)
MONOCYTES NFR BLD AUTO: 5.9 % — SIGNIFICANT CHANGE UP (ref 2–14)
NEUTROPHILS # BLD AUTO: 7.93 K/UL — HIGH (ref 1.8–7.4)
NEUTROPHILS NFR BLD AUTO: 84.6 % — HIGH (ref 43–77)
NRBC # FLD: 0 — SIGNIFICANT CHANGE UP
PHOSPHATE SERPL-MCNC: 4.7 MG/DL — HIGH (ref 2.5–4.5)
PLATELET # BLD AUTO: 261 K/UL — SIGNIFICANT CHANGE UP (ref 150–400)
PMV BLD: 11.1 FL — SIGNIFICANT CHANGE UP (ref 7–13)
POTASSIUM SERPL-MCNC: 4.5 MMOL/L — SIGNIFICANT CHANGE UP (ref 3.5–5.3)
POTASSIUM SERPL-SCNC: 4.5 MMOL/L — SIGNIFICANT CHANGE UP (ref 3.5–5.3)
PROT SERPL-MCNC: 7.4 G/DL — SIGNIFICANT CHANGE UP (ref 6–8.3)
RBC # BLD: 3.73 M/UL — LOW (ref 4.2–5.8)
RBC # FLD: 15 % — HIGH (ref 10.3–14.5)
SODIUM SERPL-SCNC: 140 MMOL/L — SIGNIFICANT CHANGE UP (ref 135–145)
WBC # BLD: 9.37 K/UL — SIGNIFICANT CHANGE UP (ref 3.8–10.5)
WBC # FLD AUTO: 9.37 K/UL — SIGNIFICANT CHANGE UP (ref 3.8–10.5)

## 2018-12-07 PROCEDURE — 99284 EMERGENCY DEPT VISIT MOD MDM: CPT

## 2018-12-07 PROCEDURE — 71046 X-RAY EXAM CHEST 2 VIEWS: CPT | Mod: 26

## 2018-12-07 RX ORDER — HYDRALAZINE HCL 50 MG
50 TABLET ORAL ONCE
Qty: 0 | Refills: 0 | Status: COMPLETED | OUTPATIENT
Start: 2018-12-07 | End: 2018-12-07

## 2018-12-07 RX ADMIN — Medication 50 MILLIGRAM(S): at 12:58

## 2018-12-07 NOTE — ED PROVIDER NOTE - NSFOLLOWUPINSTRUCTIONS_ED_ALL_ED_FT
Follow up with your Primary MD within 1 week  Continue to monitor your blood glucose before and after meals and confirm insulin dosing prior to administration  Return to the ER immediately for any new or worsening symptoms.

## 2018-12-07 NOTE — ED PROVIDER NOTE - PROGRESS NOTE DETAILS
Kevyn: Labs unremarkable, chest xray clear, FS improved and tolerating PO. Pt's son at bedside also a physician, believes he may have accidentally gave too much insulin as the device was recently changed. He will have his PCP switch back to the old pens and continue to monitor FS throughout today. Feels well. no urinary symptoms. does not want to wait for UA. will d/c home with endocrine f/u.

## 2018-12-07 NOTE — ED ADULT NURSE NOTE - OBJECTIVE STATEMENT
Patient received AA&Ox3 BIBEMS for hypoglycemia - per family pt. too 7 units of novalog this AM, ate breakfast, and was found to have AMS per wife. Per EMS, pts' FSBS reading read as 'LO' and was given D50 IV. Per family at bedside, pt. has returned to baseline mental status. Patient denies chest pain, N/V, SOB, fever, chills, dyspnea, dizziness, abdominal pain at this time. Patient presents to ED with 18g PIV in place to right AC, labs drawn, medication administered per orders, NAD noted - will continue to monitor.

## 2018-12-07 NOTE — ED ADULT NURSE NOTE - NSIMPLEMENTINTERV_GEN_ALL_ED
Implemented All Universal Safety Interventions:  [place]ideon to call system. Call bell, personal items and telephone within reach. Instruct patient to call for assistance. Room bathroom lighting operational. Non-slip footwear when patient is off stretcher. Physically safe environment: no spills, clutter or unnecessary equipment. Stretcher in lowest position, wheels locked, appropriate side rails in place.

## 2018-12-07 NOTE — ED PROVIDER NOTE - PHYSICAL EXAMINATION
GEN - NAD; well appearing; A+O x3   HEAD - NC/AT     EYES - EOMI, no conjunctival pallor, no scleral icterus  ENT -   mucous membranes  moist , no discharge      NECK - Neck supple  PULM - CTA b/l,  symmetric breath sounds  COR -  RRR, S1 S2, no murmurs  ABD - , ND, NT, soft, no guarding, no rebound, no masses    BACK - no CVA tenderness, nontender spine     EXTREMS - no edema, no deformity, warm and well perfused    SKIN - no rash or bruising      NEUROLOGIC - alert, sensation nl, motor 5/5 RUE/LUE/RLE/LLE

## 2018-12-07 NOTE — ED PROVIDER NOTE - ATTENDING CONTRIBUTION TO CARE
Dr. Bella: I have personally performed a face to face bedside history and physical examination of this patient. I have discussed the history, examination, review of systems, assessment and plan of management with the resident. I have reviewed the electronic medical record and amended it to reflect my history, review of systems, physical exam, assessment and plan.      see chart

## 2018-12-07 NOTE — ED PROVIDER NOTE - MEDICAL DECISION MAKING DETAILS
Jena: pt with hypoglycemia in setting of insulin use. Pt has not been checking his FS prior to taking insulin. possibly med error. will r/o underlying infection, electrolyte abnormality. Plan for labs, UA, CXR. Quality 226: Preventive Care And Screening: Tobacco Use: Screening And Cessation Intervention: Patient screened for tobacco and never smoked Quality 110: Preventive Care And Screening: Influenza Immunization: Influenza Immunization not Administered because Patient Refused. Quality 130: Documentation Of Current Medications In The Medical Record: Current Medications Documented Detail Level: Detailed

## 2018-12-07 NOTE — ED ADULT TRIAGE NOTE - CHIEF COMPLAINT QUOTE
Patient brought to ER by EMS for hypoglycemia. Pt has a 18 gauge right AC. FS read LO upon EMS arrival and he had a sandwich, juice, D50 25grams given by EMS, . Wife called because patient was not acting right and lethargic.

## 2019-01-09 ENCOUNTER — APPOINTMENT (OUTPATIENT)
Dept: TRANSPLANT | Facility: CLINIC | Age: 64
End: 2019-01-09
Payer: COMMERCIAL

## 2019-01-09 ENCOUNTER — LABORATORY RESULT (OUTPATIENT)
Age: 64
End: 2019-01-09

## 2019-01-09 PROCEDURE — XXXXX: CPT

## 2019-01-10 ENCOUNTER — CHART COPY (OUTPATIENT)
Age: 64
End: 2019-01-10

## 2019-01-10 LAB
ABO + RH PNL BLD: NORMAL
ALBUMIN SERPL ELPH-MCNC: 3.7 G/DL
ALP BLD-CCNC: 126 U/L
ALT SERPL-CCNC: 8 U/L
ANION GAP SERPL CALC-SCNC: 14 MMOL/L
APPEARANCE: CLEAR
AST SERPL-CCNC: 14 U/L
BACTERIA: NEGATIVE
BASOPHILS # BLD AUTO: 0.03 K/UL
BASOPHILS NFR BLD AUTO: 0.4 %
BILIRUB SERPL-MCNC: 0.2 MG/DL
BILIRUBIN URINE: NEGATIVE
BLOOD URINE: NEGATIVE
BUN SERPL-MCNC: 66 MG/DL
CALCIUM SERPL-MCNC: 7.2 MG/DL
CHLORIDE SERPL-SCNC: 104 MMOL/L
CMV IGG SERPL QL: >10 U/ML
CMV IGG SERPL-IMP: POSITIVE
CO2 SERPL-SCNC: 21 MMOL/L
COLOR: YELLOW
CREAT SERPL-MCNC: 5.75 MG/DL
CREAT SPEC-SCNC: 55 MG/DL
CREAT/PROT UR: 5.2 RATIO
EOSINOPHIL # BLD AUTO: 0.36 K/UL
EOSINOPHIL NFR BLD AUTO: 4.6 %
GLUCOSE QUALITATIVE U: 100 MG/DL
GLUCOSE SERPL-MCNC: 111 MG/DL
HAV IGM SER QL: NONREACTIVE
HBA1C MFR BLD HPLC: 7 %
HBV CORE IGG+IGM SER QL: REACTIVE
HBV SURFACE AB SER QL: REACTIVE
HBV SURFACE AB SERPL IA-ACNC: >1000 MIU/ML
HBV SURFACE AG SER QL: NONREACTIVE
HCT VFR BLD CALC: 30.4 %
HCV AB SER QL: NONREACTIVE
HCV S/CO RATIO: 0.14 S/CO
HEPATITIS A IGG ANTIBODY: REACTIVE
HGB BLD-MCNC: 9.4 G/DL
HIV1+2 AB SPEC QL IA.RAPID: NONREACTIVE
HSV 1+2 IGG SER IA-IMP: NEGATIVE
HSV 1+2 IGG SER IA-IMP: POSITIVE
HSV1 IGG SER QL: 54.1 INDEX
HSV2 IGG SER QL: 0.09 INDEX
HYALINE CASTS: 0 /LPF
IMM GRANULOCYTES NFR BLD AUTO: 0.3 %
KETONES URINE: NEGATIVE
LEUKOCYTE ESTERASE URINE: NEGATIVE
LYMPHOCYTES # BLD AUTO: 2.34 K/UL
LYMPHOCYTES NFR BLD AUTO: 30 %
MAGNESIUM SERPL-MCNC: 1.9 MG/DL
MAN DIFF?: NORMAL
MCHC RBC-ENTMCNC: 27.6 PG
MCHC RBC-ENTMCNC: 30.9 GM/DL
MCV RBC AUTO: 89.4 FL
MICROSCOPIC-UA: NORMAL
MONOCYTES # BLD AUTO: 0.45 K/UL
MONOCYTES NFR BLD AUTO: 5.8 %
NEUTROPHILS # BLD AUTO: 4.61 K/UL
NEUTROPHILS NFR BLD AUTO: 58.9 %
NITRITE URINE: NEGATIVE
PH URINE: 6.5
PHOSPHATE SERPL-MCNC: 5.6 MG/DL
PLATELET # BLD AUTO: 197 K/UL
POTASSIUM SERPL-SCNC: 4.8 MMOL/L
PROT SERPL-MCNC: 7.3 G/DL
PROT UR-MCNC: 284 MG/DL
PROTEIN URINE: 300 MG/DL
PSA SERPL-MCNC: 1.7 NG/ML
RBC # BLD: 3.4 M/UL
RBC # FLD: 15.1 %
RED BLOOD CELLS URINE: 0 /HPF
RUBV IGG FLD-ACNC: 4.6 INDEX
RUBV IGG SER-IMP: POSITIVE
SODIUM SERPL-SCNC: 139 MMOL/L
SPECIFIC GRAVITY URINE: 1.01
SQUAMOUS EPITHELIAL CELLS: 0 /HPF
T PALLIDUM AB SER QL IA: NEGATIVE
UROBILINOGEN URINE: NEGATIVE MG/DL
VZV AB TITR SER: POSITIVE
VZV IGG SER IF-ACNC: 2123 INDEX
WBC # FLD AUTO: 7.81 K/UL
WHITE BLOOD CELLS URINE: 1 /HPF

## 2019-01-11 LAB
EBV DNA SERPL NAA+PROBE-ACNC: NOT DETECTED
EBVPCR LOG: NOT DETECTED LOGIU/ML

## 2019-01-14 LAB
M TB IFN-G BLD-IMP: NEGATIVE
QUANTIFERON TB PLUS MITOGEN MINUS NIL: 8.17 IU/ML
QUANTIFERON TB PLUS NIL: 0.03 IU/ML
QUANTIFERON TB PLUS TB1 MINUS NIL: 0 IU/ML
QUANTIFERON TB PLUS TB2 MINUS NIL: 0 IU/ML

## 2019-01-30 ENCOUNTER — APPOINTMENT (OUTPATIENT)
Dept: NEPHROLOGY | Facility: CLINIC | Age: 64
End: 2019-01-30
Payer: COMMERCIAL

## 2019-01-30 VITALS — DIASTOLIC BLOOD PRESSURE: 70 MMHG | SYSTOLIC BLOOD PRESSURE: 150 MMHG

## 2019-01-30 VITALS
HEIGHT: 69 IN | HEART RATE: 63 BPM | DIASTOLIC BLOOD PRESSURE: 77 MMHG | SYSTOLIC BLOOD PRESSURE: 180 MMHG | BODY MASS INDEX: 25.14 KG/M2 | WEIGHT: 169.75 LBS | OXYGEN SATURATION: 99 %

## 2019-01-30 DIAGNOSIS — R80.9 PROTEINURIA, UNSPECIFIED: ICD-10-CM

## 2019-01-30 PROCEDURE — 99214 OFFICE O/P EST MOD 30 MIN: CPT | Mod: 25,GC

## 2019-01-30 PROCEDURE — 96372 THER/PROPH/DIAG INJ SC/IM: CPT

## 2019-01-30 RX ORDER — ERYTHROPOIETIN 10000 [IU]/ML
10000 INJECTION, SOLUTION INTRAVENOUS; SUBCUTANEOUS
Qty: 1 | Refills: 0 | Status: COMPLETED | OUTPATIENT
Start: 2019-01-30

## 2019-01-30 RX ADMIN — ERYTHROPOIETIN 0 UNIT/ML: 10000 INJECTION, SOLUTION INTRAVENOUS; SUBCUTANEOUS at 00:00

## 2019-01-30 NOTE — REVIEW OF SYSTEMS
[As Noted in HPI] : as noted in HPI [Negative] : Endocrine [Fever] : no fever [Feeling Poorly] : not feeling poorly [Recent Weight Gain (___ Lbs)] : no recent weight gain [Nosebleeds] : no nosebleeds [Nasal Discharge] : no nasal discharge [Sore Throat] : no sore throat [Heart Rate Is Slow] : the heart rate was not slow [Chest Pain] : no chest pain [Palpitations] : no palpitations [Leg Claudication] : no intermittent leg claudication [Lower Ext Edema] : no extremity edema [FreeTextEntry3] : diabetic retinopathy with laser surgery 1 year ago.

## 2019-01-30 NOTE — HISTORY OF PRESENT ILLNESS
[FreeTextEntry1] : Mr. DUNBAR is a 63 year old male with CKD stage V \par Pt reports is feeling well, able to sleep at night, not sob, not having LE edema, good appetite, no nausea/ vomiting, no tremors, headaches,  following with transplant team, and in transition care team. Compliant with meds but not always compliant with sodium and fluid restriction, states urinating well. BP today in the office was elevated but claimed forgot to take lasix yesterday, repeated improve as documented in the chart. \par \par

## 2019-01-30 NOTE — END OF VISIT
[] : Fellow [FreeTextEntry3] : Procrit 10,000 units today\par Rpeat in 1 month\par and f.u in 2 months

## 2019-01-30 NOTE — ASSESSMENT
[FreeTextEntry1] : Mr. DUNBAR is a 63 year old male with CKDV likely related to related to DM/HTN. \par \par 1. CKD V -  Labs done 2 weeks ago showing increased in serum creat from 5.14 in Oct/18 to 5.75 Jan/19, BUN mild increased to 66, but pt remains asymptomatic, functional, working. Weight minimal increased. Pt was re enforced about sodium and fluid restriction \par Will Check renal panel in 2 weeks, await need for PD. No overt uremic symptoms\par \par 2. Anemia: stable and no symptoms now. Last hgb was 9.4 will give Procrit 19924 units today. \par \par 3. Check pth  and vitamin d in 2 weeks, Tyson 7.7 told patient to take tums 1 tab with meals. \par \par 4. HTN: volume mediated, on lasix 40mg BID, and Hydralazine, BP repeated improved, will monitor closely.  Salt and water restriction advised.\par \par Procrit again in 2-3 weeks and 1 month\par f/u 2 months

## 2019-01-30 NOTE — PHYSICAL EXAM
[General Appearance - Alert] : alert [General Appearance - In No Acute Distress] : in no acute distress [Sclera] : the sclera and conjunctiva were normal [Outer Ear] : the ears and nose were normal in appearance [Examination Of The Oral Cavity] : the lips and gums were normal [Oropharynx] : the oropharynx was normal [Neck Appearance] : the appearance of the neck was normal [Neck Cervical Mass (___cm)] : no neck mass was observed [Jugular Venous Distention Increased] : there was no jugular-venous distention [Thyroid Diffuse Enlargement] : the thyroid was not enlarged [Respiration, Rhythm And Depth] : normal respiratory rhythm and effort [Auscultation Breath Sounds / Voice Sounds] : lungs were clear to auscultation bilaterally [Apical Impulse] : the apical impulse was normal [Heart Rate And Rhythm] : heart rate was normal and rhythm regular [Heart Sounds] : normal S1 and S2 [Heart Sounds Gallop] : no gallops [Murmurs] : no murmurs [Heart Sounds Pericardial Friction Rub] : no pericardial rub [Pitting Edema] : pitting edema present [___ +] : bilateral [unfilled]+ pretibial pitting edema [Bowel Sounds] : normal bowel sounds [Abdomen Soft] : soft [Abdomen Tenderness] : non-tender [] : no hepato-splenomegaly [Abdomen Mass (___ Cm)] : no abdominal mass palpated [Cervical Lymph Nodes Enlarged Posterior Bilaterally] : posterior cervical [Cervical Lymph Nodes Enlarged Anterior Bilaterally] : anterior cervical [Supraclavicular Lymph Nodes Enlarged Bilaterally] : supraclavicular [No CVA Tenderness] : no ~M costovertebral angle tenderness [Skin Lesions] : no skin lesions [Oriented To Time, Place, And Person] : oriented to person, place, and time [Affect] : the affect was normal [FreeTextEntry1] : trace edema

## 2019-02-06 ENCOUNTER — APPOINTMENT (OUTPATIENT)
Dept: CARDIOLOGY | Facility: CLINIC | Age: 64
End: 2019-02-06
Payer: COMMERCIAL

## 2019-02-06 ENCOUNTER — NON-APPOINTMENT (OUTPATIENT)
Age: 64
End: 2019-02-06

## 2019-02-06 VITALS
OXYGEN SATURATION: 100 % | HEIGHT: 69 IN | HEART RATE: 67 BPM | RESPIRATION RATE: 12 BRPM | SYSTOLIC BLOOD PRESSURE: 180 MMHG | DIASTOLIC BLOOD PRESSURE: 79 MMHG | WEIGHT: 165 LBS | BODY MASS INDEX: 24.44 KG/M2

## 2019-02-06 VITALS — SYSTOLIC BLOOD PRESSURE: 160 MMHG | DIASTOLIC BLOOD PRESSURE: 80 MMHG

## 2019-02-06 PROCEDURE — 99215 OFFICE O/P EST HI 40 MIN: CPT | Mod: 25

## 2019-02-06 PROCEDURE — 93000 ELECTROCARDIOGRAM COMPLETE: CPT

## 2019-02-06 NOTE — PHYSICAL EXAM
[General Appearance - Well Developed] : well developed [Normal Appearance] : normal appearance [Well Groomed] : well groomed [General Appearance - Well Nourished] : well nourished [No Deformities] : no deformities [General Appearance - In No Acute Distress] : no acute distress [Normal Conjunctiva] : the conjunctiva exhibited no abnormalities [Eyelids - No Xanthelasma] : the eyelids demonstrated no xanthelasmas [Normal Oral Mucosa] : normal oral mucosa [No Oral Pallor] : no oral pallor [No Oral Cyanosis] : no oral cyanosis [Normal Jugular Venous A Waves Present] : normal jugular venous A waves present [Normal Jugular Venous V Waves Present] : normal jugular venous V waves present [No Jugular Venous Rodriguez A Waves] : no jugular venous rodriguez A waves [Respiration, Rhythm And Depth] : normal respiratory rhythm and effort [Exaggerated Use Of Accessory Muscles For Inspiration] : no accessory muscle use [Auscultation Breath Sounds / Voice Sounds] : lungs were clear to auscultation bilaterally [Heart Rate And Rhythm] : heart rate and rhythm were normal [Heart Sounds] : normal S1 and S2 [Murmurs] : no murmurs present [Abdomen Soft] : soft [Abdomen Tenderness] : non-tender [Abdomen Mass (___ Cm)] : no abdominal mass palpated [Abnormal Walk] : normal gait [Gait - Sufficient For Exercise Testing] : the gait was sufficient for exercise testing [Nail Clubbing] : no clubbing of the fingernails [Cyanosis, Localized] : no localized cyanosis [Petechial Hemorrhages (___cm)] : no petechial hemorrhages [Skin Color & Pigmentation] : normal skin color and pigmentation [] : no rash [No Venous Stasis] : no venous stasis [Skin Lesions] : no skin lesions [No Skin Ulcers] : no skin ulcer [No Xanthoma] : no  xanthoma was observed [Oriented To Time, Place, And Person] : oriented to person, place, and time [Affect] : the affect was normal [Mood] : the mood was normal [No Anxiety] : not feeling anxious

## 2019-02-06 NOTE — HISTORY OF PRESENT ILLNESS
[FreeTextEntry1] : Srinivasan has been feeling well with no symptoms of chest pain, palpitations or shortness of breath. He worked last night and has not slept.

## 2019-03-14 ENCOUNTER — APPOINTMENT (OUTPATIENT)
Dept: NEPHROLOGY | Facility: CLINIC | Age: 64
End: 2019-03-14

## 2019-03-14 VITALS — DIASTOLIC BLOOD PRESSURE: 70 MMHG | SYSTOLIC BLOOD PRESSURE: 174 MMHG | HEIGHT: 66 IN

## 2019-03-14 RX ADMIN — EPOETIN ALFA 0 UNIT/ML: 10000 SOLUTION INTRAVENOUS; SUBCUTANEOUS at 00:00

## 2019-03-15 ENCOUNTER — APPOINTMENT (OUTPATIENT)
Dept: NEPHROLOGY | Facility: CLINIC | Age: 64
End: 2019-03-15

## 2019-03-16 LAB
ALBUMIN SERPL ELPH-MCNC: 3.7 G/DL
ANION GAP SERPL CALC-SCNC: 16 MMOL/L
BASOPHILS # BLD AUTO: 0.03 K/UL
BASOPHILS NFR BLD AUTO: 0.4 %
BUN SERPL-MCNC: 72 MG/DL
CALCIUM SERPL-MCNC: 7.1 MG/DL
CHLORIDE SERPL-SCNC: 106 MMOL/L
CO2 SERPL-SCNC: 19 MMOL/L
CREAT SERPL-MCNC: 6.8 MG/DL
EOSINOPHIL # BLD AUTO: 0.28 K/UL
EOSINOPHIL NFR BLD AUTO: 3.6 %
GLUCOSE SERPL-MCNC: 83 MG/DL
HCT VFR BLD CALC: 28.2 %
HGB BLD-MCNC: 8.5 G/DL
IMM GRANULOCYTES NFR BLD AUTO: 0.3 %
LYMPHOCYTES # BLD AUTO: 1.99 K/UL
LYMPHOCYTES NFR BLD AUTO: 25.8 %
MAN DIFF?: NORMAL
MCHC RBC-ENTMCNC: 29.2 PG
MCHC RBC-ENTMCNC: 30.1 GM/DL
MCV RBC AUTO: 96.9 FL
MONOCYTES # BLD AUTO: 0.56 K/UL
MONOCYTES NFR BLD AUTO: 7.3 %
NEUTROPHILS # BLD AUTO: 4.82 K/UL
NEUTROPHILS NFR BLD AUTO: 62.6 %
PHOSPHATE SERPL-MCNC: 6 MG/DL
PLATELET # BLD AUTO: 213 K/UL
POTASSIUM SERPL-SCNC: 5.4 MMOL/L
RBC # BLD: 2.91 M/UL
RBC # FLD: 13.7 %
SODIUM SERPL-SCNC: 141 MMOL/L
WBC # FLD AUTO: 7.7 K/UL

## 2019-04-02 ENCOUNTER — APPOINTMENT (OUTPATIENT)
Dept: NEPHROLOGY | Facility: CLINIC | Age: 64
End: 2019-04-02
Payer: COMMERCIAL

## 2019-04-02 VITALS
OXYGEN SATURATION: 99 % | HEIGHT: 66 IN | SYSTOLIC BLOOD PRESSURE: 141 MMHG | HEART RATE: 64 BPM | BODY MASS INDEX: 26.82 KG/M2 | WEIGHT: 166.89 LBS | DIASTOLIC BLOOD PRESSURE: 70 MMHG

## 2019-04-02 DIAGNOSIS — D63.1 ANEMIA IN CHRONIC KIDNEY DISEASE: ICD-10-CM

## 2019-04-02 PROCEDURE — 96372 THER/PROPH/DIAG INJ SC/IM: CPT

## 2019-04-02 PROCEDURE — 99213 OFFICE O/P EST LOW 20 MIN: CPT | Mod: 25

## 2019-04-02 RX ORDER — ERYTHROPOIETIN 10000 [IU]/ML
10000 INJECTION, SOLUTION INTRAVENOUS; SUBCUTANEOUS
Qty: 1 | Refills: 0 | Status: COMPLETED | OUTPATIENT
Start: 2019-04-02

## 2019-04-02 NOTE — ASSESSMENT
[FreeTextEntry1] : Mr. DUNBAR is a 63 year old male with CKDV likely related to related to DM/HTN. \par \par 1. CKD V -  Labs done 2 weeks ago showing increased in serum creat from 5.14 in Oct/18 to 5.75 Jan/19, BUN mild increased to 66, but pt remains asymptomatic, functional, working. Weight minimal increased. Pt was re enforced about sodium and fluid restriction \par Will Check renal panel in 2 weeks, await need for PD. No overt uremic symptoms\par \par 2. Anemia: stable and no symptoms now. Last hgb was 8.5 will give Procrit 54581 units today. \par needs procrit again in 2 weeks and total for 2 months and reassess\par \par \par 4. HTN: volume mediated, on lasix 40mg BID, and Hydralazine, BP repeated improved, will monitor closely.  Salt and water restriction advised.\par \par f/u in 2 months for full visit and f.u in 2 weeks for procrit\par

## 2019-04-02 NOTE — PHYSICAL EXAM
[General Appearance - Alert] : alert [General Appearance - In No Acute Distress] : in no acute distress [Sclera] : the sclera and conjunctiva were normal [Outer Ear] : the ears and nose were normal in appearance [Examination Of The Oral Cavity] : the lips and gums were normal [Oropharynx] : the oropharynx was normal [Neck Appearance] : the appearance of the neck was normal [Neck Cervical Mass (___cm)] : no neck mass was observed [Jugular Venous Distention Increased] : there was no jugular-venous distention [Thyroid Diffuse Enlargement] : the thyroid was not enlarged [Respiration, Rhythm And Depth] : normal respiratory rhythm and effort [Auscultation Breath Sounds / Voice Sounds] : lungs were clear to auscultation bilaterally [Apical Impulse] : the apical impulse was normal [Heart Rate And Rhythm] : heart rate was normal and rhythm regular [Heart Sounds] : normal S1 and S2 [Heart Sounds Gallop] : no gallops [Murmurs] : no murmurs [Heart Sounds Pericardial Friction Rub] : no pericardial rub [Pitting Edema] : pitting edema present [___ +] : bilateral [unfilled]+ pretibial pitting edema [FreeTextEntry1] : trace edema [Bowel Sounds] : normal bowel sounds [Abdomen Soft] : soft [Abdomen Tenderness] : non-tender [] : no hepato-splenomegaly [Abdomen Mass (___ Cm)] : no abdominal mass palpated [Cervical Lymph Nodes Enlarged Posterior Bilaterally] : posterior cervical [Cervical Lymph Nodes Enlarged Anterior Bilaterally] : anterior cervical [Supraclavicular Lymph Nodes Enlarged Bilaterally] : supraclavicular [No CVA Tenderness] : no ~M costovertebral angle tenderness [Skin Lesions] : no skin lesions [Oriented To Time, Place, And Person] : oriented to person, place, and time [Affect] : the affect was normal

## 2019-04-02 NOTE — REVIEW OF SYSTEMS
[Fever] : no fever [Feeling Poorly] : not feeling poorly [Recent Weight Gain (___ Lbs)] : no recent weight gain [As Noted in HPI] : as noted in HPI [Nosebleeds] : no nosebleeds [Nasal Discharge] : no nasal discharge [Sore Throat] : no sore throat [Heart Rate Is Slow] : the heart rate was not slow [Chest Pain] : no chest pain [Palpitations] : no palpitations [Leg Claudication] : no intermittent leg claudication [Lower Ext Edema] : no extremity edema [Negative] : Endocrine [FreeTextEntry3] : diabetic retinopathy with laser surgery 1 year ago.

## 2019-04-02 NOTE — REASON FOR VISIT
[Follow-Up] : a follow-up visit [FreeTextEntry1] : for CKD and anemia [Procedure: _________] : a [unfilled] procedure visit

## 2019-04-02 NOTE — HISTORY OF PRESENT ILLNESS
[FreeTextEntry1] : Mr. DUNBAR is a 63 year old male with CKD stage V \par Pt reports is feeling well, able to sleep at night, not sob, not having LE edema, good appetite, no nausea/ vomiting, no tremors, headaches,  following with transplant team, and in transition care team. Compliant with meds but not always compliant with sodium and fluid restriction, states urinating well.\par \par

## 2019-04-02 NOTE — ASSESSMENT
[FreeTextEntry1] : Mr. DUNBAR is a 63 year old male with CKDV likely related to related to DM/HTN. \par \par 1. CKD V -  Labs done 2 weeks ago showing increased in serum creat from 5.14 in Oct/18 to 5.75 Jan/19, BUN mild increased to 66, but pt remains asymptomatic, functional, working. Weight minimal increased. Pt was re enforced about sodium and fluid restriction \par Will Check renal panel in 2 weeks, await need for PD. No overt uremic symptoms\par \par 2. Anemia: stable and no symptoms now. Last hgb was 8.5 will give Procrit [id number] units today. \par needs procrit again in 2 weeks and total for 2 months and reassess\par \par \par 4. HTN: volume mediated, on lasix 40mg BID, and Hydralazine, BP repeated improved, will monitor closely.  Salt and water restriction advised.\par \par f/u in 2 months for full visit and f.u in 2 weeks for procrit\par

## 2019-04-17 ENCOUNTER — APPOINTMENT (OUTPATIENT)
Dept: NEPHROLOGY | Facility: CLINIC | Age: 64
End: 2019-04-17
Payer: COMMERCIAL

## 2019-04-17 VITALS — DIASTOLIC BLOOD PRESSURE: 67 MMHG | HEART RATE: 58 BPM | SYSTOLIC BLOOD PRESSURE: 141 MMHG

## 2019-04-17 VITALS
BODY MASS INDEX: 27 KG/M2 | WEIGHT: 168 LBS | OXYGEN SATURATION: 99 % | SYSTOLIC BLOOD PRESSURE: 153 MMHG | HEART RATE: 60 BPM | DIASTOLIC BLOOD PRESSURE: 63 MMHG | HEIGHT: 66 IN

## 2019-04-17 PROCEDURE — 96372 THER/PROPH/DIAG INJ SC/IM: CPT

## 2019-04-17 RX ORDER — ERYTHROPOIETIN 10000 [IU]/ML
10000 INJECTION, SOLUTION INTRAVENOUS; SUBCUTANEOUS
Qty: 1 | Refills: 0 | Status: COMPLETED | OUTPATIENT
Start: 2019-04-17

## 2019-04-17 RX ADMIN — ERYTHROPOIETIN 0 UNIT/ML: 10000 INJECTION, SOLUTION INTRAVENOUS; SUBCUTANEOUS at 00:00

## 2019-04-18 DIAGNOSIS — R53.83 OTHER FATIGUE: ICD-10-CM

## 2019-04-18 LAB
ALBUMIN SERPL ELPH-MCNC: 3.9 G/DL
ANION GAP SERPL CALC-SCNC: 19 MMOL/L
BUN SERPL-MCNC: 82 MG/DL
CALCIUM SERPL-MCNC: 6.6 MG/DL
CHLORIDE SERPL-SCNC: 101 MMOL/L
CK SERPL-CCNC: 218 U/L
CO2 SERPL-SCNC: 20 MMOL/L
CREAT SERPL-MCNC: 7.61 MG/DL
GLUCOSE SERPL-MCNC: 77 MG/DL
PHOSPHATE SERPL-MCNC: 6.1 MG/DL
POTASSIUM SERPL-SCNC: 5.1 MMOL/L
SODIUM SERPL-SCNC: 139 MMOL/L

## 2019-05-01 ENCOUNTER — APPOINTMENT (OUTPATIENT)
Dept: TRANSPLANT | Facility: CLINIC | Age: 64
End: 2019-05-01
Payer: COMMERCIAL

## 2019-05-01 VITALS
DIASTOLIC BLOOD PRESSURE: 78 MMHG | OXYGEN SATURATION: 100 % | TEMPERATURE: 98.2 F | SYSTOLIC BLOOD PRESSURE: 195 MMHG | WEIGHT: 165 LBS | BODY MASS INDEX: 26.52 KG/M2 | HEIGHT: 66 IN | HEART RATE: 69 BPM

## 2019-05-01 PROCEDURE — 99203 OFFICE O/P NEW LOW 30 MIN: CPT

## 2019-05-16 ENCOUNTER — MOBILE ON CALL (OUTPATIENT)
Age: 64
End: 2019-05-16

## 2019-05-20 ENCOUNTER — APPOINTMENT (OUTPATIENT)
Dept: TRANSPLANT | Facility: HOSPITAL | Age: 64
End: 2019-05-20

## 2019-05-21 ENCOUNTER — APPOINTMENT (OUTPATIENT)
Dept: NEPHROLOGY | Facility: CLINIC | Age: 64
End: 2019-05-21

## 2019-05-21 DIAGNOSIS — Z00.00 ENCOUNTER FOR GENERAL ADULT MEDICAL EXAMINATION W/OUT ABNORMAL FINDINGS: ICD-10-CM

## 2019-05-22 ENCOUNTER — APPOINTMENT (OUTPATIENT)
Dept: NEPHROLOGY | Facility: CLINIC | Age: 64
End: 2019-05-22

## 2019-05-22 ENCOUNTER — APPOINTMENT (OUTPATIENT)
Dept: TRANSPLANT | Facility: CLINIC | Age: 64
End: 2019-05-22
Payer: COMMERCIAL

## 2019-05-22 ENCOUNTER — APPOINTMENT (OUTPATIENT)
Dept: NEPHROLOGY | Facility: CLINIC | Age: 64
End: 2019-05-22
Payer: COMMERCIAL

## 2019-05-22 VITALS
HEIGHT: 66 IN | HEART RATE: 57 BPM | TEMPERATURE: 97.8 F | SYSTOLIC BLOOD PRESSURE: 192 MMHG | WEIGHT: 164 LBS | DIASTOLIC BLOOD PRESSURE: 70 MMHG | OXYGEN SATURATION: 100 % | BODY MASS INDEX: 26.36 KG/M2

## 2019-05-22 VITALS
HEART RATE: 59 BPM | SYSTOLIC BLOOD PRESSURE: 159 MMHG | OXYGEN SATURATION: 99 % | BODY MASS INDEX: 26.52 KG/M2 | WEIGHT: 165 LBS | HEIGHT: 66 IN | DIASTOLIC BLOOD PRESSURE: 70 MMHG

## 2019-05-22 DIAGNOSIS — N18.9 CHRONIC KIDNEY DISEASE, UNSPECIFIED: ICD-10-CM

## 2019-05-22 DIAGNOSIS — D63.1 CHRONIC KIDNEY DISEASE, UNSPECIFIED: ICD-10-CM

## 2019-05-22 LAB
ALBUMIN SERPL ELPH-MCNC: 3.8 G/DL
ANION GAP SERPL CALC-SCNC: 16 MMOL/L
APPEARANCE: CLEAR
BACTERIA: NEGATIVE
BASOPHILS # BLD AUTO: 0.02 K/UL
BASOPHILS NFR BLD AUTO: 0.3 %
BILIRUBIN URINE: NEGATIVE
BLOOD URINE: NORMAL
BUN SERPL-MCNC: 81 MG/DL
CALCIUM SERPL-MCNC: 6.9 MG/DL
CALCIUM SERPL-MCNC: 6.9 MG/DL
CHLORIDE SERPL-SCNC: 105 MMOL/L
CO2 SERPL-SCNC: 17 MMOL/L
COLOR: NORMAL
CREAT SERPL-MCNC: 7.68 MG/DL
CREAT SPEC-SCNC: 51 MG/DL
CREAT/PROT UR: 6.5 RATIO
EOSINOPHIL # BLD AUTO: 0.16 K/UL
EOSINOPHIL NFR BLD AUTO: 2.2 %
GLUCOSE QUALITATIVE U: ABNORMAL
GLUCOSE SERPL-MCNC: 87 MG/DL
HCT VFR BLD CALC: 25.9 %
HGB BLD-MCNC: 8 G/DL
HYALINE CASTS: 1 /LPF
IMM GRANULOCYTES NFR BLD AUTO: 0.4 %
IRON SATN MFR SERPL: 17 %
IRON SERPL-MCNC: 38 UG/DL
KETONES URINE: NEGATIVE
LEUKOCYTE ESTERASE URINE: NEGATIVE
LYMPHOCYTES # BLD AUTO: 1.8 K/UL
LYMPHOCYTES NFR BLD AUTO: 24.7 %
MAGNESIUM SERPL-MCNC: 2.1 MG/DL
MAN DIFF?: NORMAL
MCHC RBC-ENTMCNC: 28.6 PG
MCHC RBC-ENTMCNC: 30.9 GM/DL
MCV RBC AUTO: 92.5 FL
MICROSCOPIC-UA: NORMAL
MONOCYTES # BLD AUTO: 0.52 K/UL
MONOCYTES NFR BLD AUTO: 7.1 %
NEUTROPHILS # BLD AUTO: 4.76 K/UL
NEUTROPHILS NFR BLD AUTO: 65.3 %
NITRITE URINE: NEGATIVE
PARATHYROID HORMONE INTACT: 506 PG/ML
PH URINE: 7
PHOSPHATE SERPL-MCNC: 6 MG/DL
PLATELET # BLD AUTO: 238 K/UL
POTASSIUM SERPL-SCNC: 4.8 MMOL/L
PROT UR-MCNC: 331 MG/DL
PROTEIN URINE: ABNORMAL
RBC # BLD: 2.8 M/UL
RBC # FLD: 13.3 %
RED BLOOD CELLS URINE: 2 /HPF
SODIUM SERPL-SCNC: 138 MMOL/L
SPECIFIC GRAVITY URINE: 1.01
SQUAMOUS EPITHELIAL CELLS: 1 /HPF
TIBC SERPL-MCNC: 225 UG/DL
UIBC SERPL-MCNC: 187 UG/DL
UROBILINOGEN URINE: NORMAL
WBC # FLD AUTO: 7.29 K/UL
WHITE BLOOD CELLS URINE: 2 /HPF

## 2019-05-22 PROCEDURE — 96372 THER/PROPH/DIAG INJ SC/IM: CPT

## 2019-05-22 PROCEDURE — 99215 OFFICE O/P EST HI 40 MIN: CPT

## 2019-05-22 RX ORDER — ERYTHROPOIETIN 10000 [IU]/ML
10000 INJECTION, SOLUTION INTRAVENOUS; SUBCUTANEOUS
Qty: 1 | Refills: 0 | Status: DISCONTINUED | OUTPATIENT
Start: 2019-05-22 | End: 2019-05-22

## 2019-05-22 RX ORDER — ERYTHROPOIETIN 10000 [IU]/ML
10000 INJECTION, SOLUTION INTRAVENOUS; SUBCUTANEOUS
Qty: 1 | Refills: 0 | Status: COMPLETED | OUTPATIENT
Start: 2019-05-22

## 2019-05-22 RX ADMIN — ERYTHROPOIETIN 0 UNIT/ML: 10000 INJECTION, SOLUTION INTRAVENOUS; SUBCUTANEOUS at 00:00

## 2019-05-22 RX ADMIN — EPOETIN ALFA 0 UNIT/ML: 10000 SOLUTION INTRAVENOUS; SUBCUTANEOUS at 00:00

## 2019-05-31 ENCOUNTER — OUTPATIENT (OUTPATIENT)
Dept: OUTPATIENT SERVICES | Facility: HOSPITAL | Age: 64
LOS: 1 days | End: 2019-05-31
Payer: COMMERCIAL

## 2019-05-31 ENCOUNTER — APPOINTMENT (OUTPATIENT)
Dept: ULTRASOUND IMAGING | Facility: CLINIC | Age: 64
End: 2019-05-31
Payer: COMMERCIAL

## 2019-05-31 ENCOUNTER — APPOINTMENT (OUTPATIENT)
Dept: RADIOLOGY | Facility: CLINIC | Age: 64
End: 2019-05-31
Payer: COMMERCIAL

## 2019-05-31 DIAGNOSIS — Z00.8 ENCOUNTER FOR OTHER GENERAL EXAMINATION: ICD-10-CM

## 2019-05-31 PROCEDURE — 71046 X-RAY EXAM CHEST 2 VIEWS: CPT | Mod: 26

## 2019-05-31 PROCEDURE — 93978 VASCULAR STUDY: CPT

## 2019-05-31 PROCEDURE — 76700 US EXAM ABDOM COMPLETE: CPT | Mod: 26

## 2019-05-31 PROCEDURE — 93978 VASCULAR STUDY: CPT | Mod: 26

## 2019-05-31 PROCEDURE — 76700 US EXAM ABDOM COMPLETE: CPT

## 2019-05-31 PROCEDURE — 71046 X-RAY EXAM CHEST 2 VIEWS: CPT

## 2019-06-03 ENCOUNTER — INPATIENT (INPATIENT)
Facility: HOSPITAL | Age: 64
LOS: 7 days | Discharge: HOME | DRG: 246 | End: 2019-06-11
Attending: HOSPITALIST | Admitting: HOSPITALIST
Payer: COMMERCIAL

## 2019-06-03 VITALS
TEMPERATURE: 98 F | RESPIRATION RATE: 18 BRPM | OXYGEN SATURATION: 96 % | SYSTOLIC BLOOD PRESSURE: 213 MMHG | WEIGHT: 164.91 LBS | DIASTOLIC BLOOD PRESSURE: 83 MMHG | HEIGHT: 70 IN | HEART RATE: 89 BPM

## 2019-06-03 DIAGNOSIS — E78.5 HYPERLIPIDEMIA, UNSPECIFIED: ICD-10-CM

## 2019-06-03 DIAGNOSIS — N18.6 END STAGE RENAL DISEASE: ICD-10-CM

## 2019-06-03 DIAGNOSIS — I25.10 ATHEROSCLEROTIC HEART DISEASE OF NATIVE CORONARY ARTERY WITHOUT ANGINA PECTORIS: ICD-10-CM

## 2019-06-03 DIAGNOSIS — E03.9 HYPOTHYROIDISM, UNSPECIFIED: ICD-10-CM

## 2019-06-03 DIAGNOSIS — E87.2 ACIDOSIS: ICD-10-CM

## 2019-06-03 DIAGNOSIS — N18.5 CHRONIC KIDNEY DISEASE, STAGE 5: ICD-10-CM

## 2019-06-03 DIAGNOSIS — N19 UNSPECIFIED KIDNEY FAILURE: ICD-10-CM

## 2019-06-03 DIAGNOSIS — E11.22 TYPE 2 DIABETES MELLITUS WITH DIABETIC CHRONIC KIDNEY DISEASE: ICD-10-CM

## 2019-06-03 DIAGNOSIS — Z29.9 ENCOUNTER FOR PROPHYLACTIC MEASURES, UNSPECIFIED: ICD-10-CM

## 2019-06-03 DIAGNOSIS — I10 ESSENTIAL (PRIMARY) HYPERTENSION: ICD-10-CM

## 2019-06-03 DIAGNOSIS — N18.9 CHRONIC KIDNEY DISEASE, UNSPECIFIED: ICD-10-CM

## 2019-06-03 DIAGNOSIS — I16.0 HYPERTENSIVE URGENCY: ICD-10-CM

## 2019-06-03 LAB
ALBUMIN SERPL ELPH-MCNC: 3.7 G/DL — SIGNIFICANT CHANGE UP (ref 3.3–5)
ALP SERPL-CCNC: 111 U/L — SIGNIFICANT CHANGE UP (ref 40–120)
ALT FLD-CCNC: 13 U/L — SIGNIFICANT CHANGE UP (ref 10–45)
ALT FLD-CCNC: 13 U/L — SIGNIFICANT CHANGE UP (ref 10–45)
ANION GAP SERPL CALC-SCNC: 17 MMOL/L — SIGNIFICANT CHANGE UP (ref 5–17)
APTT BLD: 26.6 SEC — LOW (ref 27.5–36.3)
AST SERPL-CCNC: 15 U/L — SIGNIFICANT CHANGE UP (ref 10–40)
BASOPHILS # BLD AUTO: 0 K/UL — SIGNIFICANT CHANGE UP (ref 0–0.2)
BASOPHILS NFR BLD AUTO: 0.7 % — SIGNIFICANT CHANGE UP (ref 0–2)
BILIRUB SERPL-MCNC: 0.2 MG/DL — SIGNIFICANT CHANGE UP (ref 0.2–1.2)
BUN SERPL-MCNC: 76 MG/DL — HIGH (ref 7–23)
CALCIUM SERPL-MCNC: 6.7 MG/DL — LOW (ref 8.4–10.5)
CHLORIDE SERPL-SCNC: 106 MMOL/L — SIGNIFICANT CHANGE UP (ref 96–108)
CO2 SERPL-SCNC: 17 MMOL/L — LOW (ref 22–31)
CREAT SERPL-MCNC: 8.13 MG/DL — HIGH (ref 0.5–1.3)
EOSINOPHIL # BLD AUTO: 0.3 K/UL — SIGNIFICANT CHANGE UP (ref 0–0.5)
EOSINOPHIL NFR BLD AUTO: 3.9 % — SIGNIFICANT CHANGE UP (ref 0–6)
GLUCOSE BLDC GLUCOMTR-MCNC: 121 MG/DL — HIGH (ref 70–99)
GLUCOSE BLDC GLUCOMTR-MCNC: 228 MG/DL — HIGH (ref 70–99)
GLUCOSE BLDC GLUCOMTR-MCNC: 73 MG/DL — SIGNIFICANT CHANGE UP (ref 70–99)
GLUCOSE SERPL-MCNC: 76 MG/DL — SIGNIFICANT CHANGE UP (ref 70–99)
HAV IGM SER-ACNC: SIGNIFICANT CHANGE UP
HBV CORE AB SER-ACNC: REACTIVE
HBV CORE IGM SER-ACNC: SIGNIFICANT CHANGE UP
HBV SURFACE AB SER-ACNC: >1000 MIU/ML — SIGNIFICANT CHANGE UP
HBV SURFACE AG SER-ACNC: SIGNIFICANT CHANGE UP
HCT VFR BLD CALC: 26.1 % — LOW (ref 39–50)
HCV AB S/CO SERPL IA: 0.13 S/CO — SIGNIFICANT CHANGE UP (ref 0–0.99)
HCV AB SERPL-IMP: SIGNIFICANT CHANGE UP
HGB BLD-MCNC: 8.7 G/DL — LOW (ref 13–17)
INR BLD: 1.12 RATIO — SIGNIFICANT CHANGE UP (ref 0.88–1.16)
LYMPHOCYTES # BLD AUTO: 2.1 K/UL — SIGNIFICANT CHANGE UP (ref 1–3.3)
LYMPHOCYTES # BLD AUTO: 29 % — SIGNIFICANT CHANGE UP (ref 13–44)
MCHC RBC-ENTMCNC: 29.9 PG — SIGNIFICANT CHANGE UP (ref 27–34)
MCHC RBC-ENTMCNC: 33.1 GM/DL — SIGNIFICANT CHANGE UP (ref 32–36)
MCV RBC AUTO: 90.1 FL — SIGNIFICANT CHANGE UP (ref 80–100)
MONOCYTES # BLD AUTO: 0.5 K/UL — SIGNIFICANT CHANGE UP (ref 0–0.9)
MONOCYTES NFR BLD AUTO: 7 % — SIGNIFICANT CHANGE UP (ref 2–14)
NEUTROPHILS # BLD AUTO: 4.4 K/UL — SIGNIFICANT CHANGE UP (ref 1.8–7.4)
NEUTROPHILS NFR BLD AUTO: 59.5 % — SIGNIFICANT CHANGE UP (ref 43–77)
OB PNL STL: NEGATIVE — SIGNIFICANT CHANGE UP
PLATELET # BLD AUTO: 253 K/UL — SIGNIFICANT CHANGE UP (ref 150–400)
POTASSIUM SERPL-MCNC: 4.8 MMOL/L — SIGNIFICANT CHANGE UP (ref 3.5–5.3)
POTASSIUM SERPL-SCNC: 4.8 MMOL/L — SIGNIFICANT CHANGE UP (ref 3.5–5.3)
PROT SERPL-MCNC: 7.1 G/DL — SIGNIFICANT CHANGE UP (ref 6–8.3)
PROTHROM AB SERPL-ACNC: 12.9 SEC — SIGNIFICANT CHANGE UP (ref 10–12.9)
RBC # BLD: 2.9 M/UL — LOW (ref 4.2–5.8)
RBC # FLD: 13 % — SIGNIFICANT CHANGE UP (ref 10.3–14.5)
SODIUM SERPL-SCNC: 140 MMOL/L — SIGNIFICANT CHANGE UP (ref 135–145)
WBC # BLD: 7.4 K/UL — SIGNIFICANT CHANGE UP (ref 3.8–10.5)
WBC # FLD AUTO: 7.4 K/UL — SIGNIFICANT CHANGE UP (ref 3.8–10.5)

## 2019-06-03 PROCEDURE — 93010 ELECTROCARDIOGRAM REPORT: CPT

## 2019-06-03 PROCEDURE — 99285 EMERGENCY DEPT VISIT HI MDM: CPT | Mod: 25

## 2019-06-03 PROCEDURE — 99223 1ST HOSP IP/OBS HIGH 75: CPT | Mod: GC

## 2019-06-03 PROCEDURE — 99222 1ST HOSP IP/OBS MODERATE 55: CPT | Mod: GC

## 2019-06-03 PROCEDURE — 76937 US GUIDE VASCULAR ACCESS: CPT | Mod: 26

## 2019-06-03 PROCEDURE — 36556 INSERT NON-TUNNEL CV CATH: CPT

## 2019-06-03 PROCEDURE — 71045 X-RAY EXAM CHEST 1 VIEW: CPT | Mod: 26

## 2019-06-03 RX ORDER — CHLORHEXIDINE GLUCONATE 213 G/1000ML
1 SOLUTION TOPICAL
Refills: 0 | Status: DISCONTINUED | OUTPATIENT
Start: 2019-06-03 | End: 2019-06-11

## 2019-06-03 RX ORDER — METOPROLOL TARTRATE 50 MG
50 TABLET ORAL DAILY
Refills: 0 | Status: DISCONTINUED | OUTPATIENT
Start: 2019-06-03 | End: 2019-06-05

## 2019-06-03 RX ORDER — LOSARTAN POTASSIUM 100 MG/1
25 TABLET, FILM COATED ORAL DAILY
Refills: 0 | Status: DISCONTINUED | OUTPATIENT
Start: 2019-06-03 | End: 2019-06-05

## 2019-06-03 RX ORDER — HYDRALAZINE HCL 50 MG
50 TABLET ORAL THREE TIMES A DAY
Refills: 0 | Status: DISCONTINUED | OUTPATIENT
Start: 2019-06-03 | End: 2019-06-05

## 2019-06-03 RX ORDER — SODIUM BICARBONATE 1 MEQ/ML
650 SYRINGE (ML) INTRAVENOUS THREE TIMES A DAY
Refills: 0 | Status: DISCONTINUED | OUTPATIENT
Start: 2019-06-03 | End: 2019-06-04

## 2019-06-03 RX ORDER — CALCIUM CARBONATE 500(1250)
1 TABLET ORAL THREE TIMES A DAY
Refills: 0 | Status: DISCONTINUED | OUTPATIENT
Start: 2019-06-03 | End: 2019-06-04

## 2019-06-03 RX ORDER — PANTOPRAZOLE SODIUM 20 MG/1
40 TABLET, DELAYED RELEASE ORAL
Refills: 0 | Status: DISCONTINUED | OUTPATIENT
Start: 2019-06-03 | End: 2019-06-11

## 2019-06-03 RX ORDER — ATORVASTATIN CALCIUM 80 MG/1
40 TABLET, FILM COATED ORAL AT BEDTIME
Refills: 0 | Status: DISCONTINUED | OUTPATIENT
Start: 2019-06-03 | End: 2019-06-07

## 2019-06-03 RX ORDER — HYDRALAZINE HCL 50 MG
10 TABLET ORAL ONCE
Refills: 0 | Status: DISCONTINUED | OUTPATIENT
Start: 2019-06-03 | End: 2019-06-03

## 2019-06-03 RX ORDER — FUROSEMIDE 40 MG
100 TABLET ORAL ONCE
Refills: 0 | Status: COMPLETED | OUTPATIENT
Start: 2019-06-03 | End: 2019-06-03

## 2019-06-03 RX ORDER — LEVOTHYROXINE SODIUM 125 MCG
50 TABLET ORAL DAILY
Refills: 0 | Status: DISCONTINUED | OUTPATIENT
Start: 2019-06-03 | End: 2019-06-11

## 2019-06-03 RX ORDER — INSULIN GLARGINE 100 [IU]/ML
5 INJECTION, SOLUTION SUBCUTANEOUS AT BEDTIME
Refills: 0 | Status: DISCONTINUED | OUTPATIENT
Start: 2019-06-03 | End: 2019-06-05

## 2019-06-03 RX ORDER — FERROUS SULFATE 325(65) MG
325 TABLET ORAL DAILY
Refills: 0 | Status: DISCONTINUED | OUTPATIENT
Start: 2019-06-03 | End: 2019-06-05

## 2019-06-03 RX ORDER — ASPIRIN/CALCIUM CARB/MAGNESIUM 324 MG
81 TABLET ORAL DAILY
Refills: 0 | Status: DISCONTINUED | OUTPATIENT
Start: 2019-06-03 | End: 2019-06-11

## 2019-06-03 RX ORDER — SODIUM CHLORIDE 9 MG/ML
1000 INJECTION, SOLUTION INTRAVENOUS
Refills: 0 | Status: DISCONTINUED | OUTPATIENT
Start: 2019-06-03 | End: 2019-06-07

## 2019-06-03 RX ORDER — DEXTROSE 50 % IN WATER 50 %
25 SYRINGE (ML) INTRAVENOUS ONCE
Refills: 0 | Status: DISCONTINUED | OUTPATIENT
Start: 2019-06-03 | End: 2019-06-07

## 2019-06-03 RX ORDER — FUROSEMIDE 40 MG
1 TABLET ORAL
Qty: 0 | Refills: 0 | DISCHARGE

## 2019-06-03 RX ORDER — HYDRALAZINE HCL 50 MG
5 TABLET ORAL ONCE
Refills: 0 | Status: COMPLETED | OUTPATIENT
Start: 2019-06-03 | End: 2019-06-03

## 2019-06-03 RX ORDER — DEXTROSE 50 % IN WATER 50 %
12.5 SYRINGE (ML) INTRAVENOUS ONCE
Refills: 0 | Status: DISCONTINUED | OUTPATIENT
Start: 2019-06-03 | End: 2019-06-07

## 2019-06-03 RX ORDER — FUROSEMIDE 40 MG
40 TABLET ORAL ONCE
Refills: 0 | Status: COMPLETED | OUTPATIENT
Start: 2019-06-03 | End: 2019-06-03

## 2019-06-03 RX ORDER — GLUCAGON INJECTION, SOLUTION 0.5 MG/.1ML
1 INJECTION, SOLUTION SUBCUTANEOUS ONCE
Refills: 0 | Status: DISCONTINUED | OUTPATIENT
Start: 2019-06-03 | End: 2019-06-07

## 2019-06-03 RX ORDER — FUROSEMIDE 40 MG
40 TABLET ORAL
Refills: 0 | Status: DISCONTINUED | OUTPATIENT
Start: 2019-06-03 | End: 2019-06-11

## 2019-06-03 RX ORDER — SODIUM CHLORIDE 9 MG/ML
10 INJECTION INTRAMUSCULAR; INTRAVENOUS; SUBCUTANEOUS
Refills: 0 | Status: DISCONTINUED | OUTPATIENT
Start: 2019-06-03 | End: 2019-06-11

## 2019-06-03 RX ORDER — INSULIN LISPRO 100/ML
VIAL (ML) SUBCUTANEOUS EVERY 6 HOURS
Refills: 0 | Status: DISCONTINUED | OUTPATIENT
Start: 2019-06-03 | End: 2019-06-06

## 2019-06-03 RX ORDER — DEXTROSE 50 % IN WATER 50 %
15 SYRINGE (ML) INTRAVENOUS ONCE
Refills: 0 | Status: DISCONTINUED | OUTPATIENT
Start: 2019-06-03 | End: 2019-06-07

## 2019-06-03 RX ORDER — HYDRALAZINE HCL 50 MG
10 TABLET ORAL ONCE
Refills: 0 | Status: COMPLETED | OUTPATIENT
Start: 2019-06-03 | End: 2019-06-03

## 2019-06-03 RX ADMIN — Medication 50 MILLIGRAM(S): at 11:14

## 2019-06-03 RX ADMIN — LOSARTAN POTASSIUM 25 MILLIGRAM(S): 100 TABLET, FILM COATED ORAL at 11:14

## 2019-06-03 RX ADMIN — Medication 50 MILLIGRAM(S): at 16:45

## 2019-06-03 RX ADMIN — Medication 5 MILLIGRAM(S): at 10:07

## 2019-06-03 RX ADMIN — Medication 50 MILLIGRAM(S): at 22:11

## 2019-06-03 RX ADMIN — Medication 10 MILLIGRAM(S): at 11:26

## 2019-06-03 RX ADMIN — Medication 40 MILLIGRAM(S): at 11:26

## 2019-06-03 RX ADMIN — Medication 650 MILLIGRAM(S): at 22:36

## 2019-06-03 RX ADMIN — ATORVASTATIN CALCIUM 40 MILLIGRAM(S): 80 TABLET, FILM COATED ORAL at 22:11

## 2019-06-03 RX ADMIN — Medication 1 TABLET(S): at 13:46

## 2019-06-03 RX ADMIN — Medication 120 MILLIGRAM(S): at 13:45

## 2019-06-03 RX ADMIN — INSULIN GLARGINE 5 UNIT(S): 100 INJECTION, SOLUTION SUBCUTANEOUS at 22:38

## 2019-06-03 RX ADMIN — Medication 1 TABLET(S): at 22:11

## 2019-06-03 RX ADMIN — Medication 325 MILLIGRAM(S): at 13:14

## 2019-06-03 RX ADMIN — Medication 81 MILLIGRAM(S): at 13:13

## 2019-06-03 RX ADMIN — PANTOPRAZOLE SODIUM 40 MILLIGRAM(S): 20 TABLET, DELAYED RELEASE ORAL at 11:14

## 2019-06-03 NOTE — PROVIDER CONTACT NOTE (OTHER) - SITUATION
Pt s/p shiley placement; bleeding noted from dressing site; Per ED RN dsg has been like this; Not new

## 2019-06-03 NOTE — ED ADULT TRIAGE NOTE - CHIEF COMPLAINT QUOTE
Pt sent in to be admitted for a possible Cardiac Cath Pt sent in to be admitted for a possible Cardiac Cath; Denies any chest pain

## 2019-06-03 NOTE — H&P ADULT - NSHPLABSRESULTS_GEN_ALL_CORE
06-03    140  |  106  |  76<H>  ----------------------------<  76  4.8   |  17<L>  |  8.13<H>    Ca    6.7<L>      03 Jun 2019 07:53    TPro  7.1  /  Alb  3.7  /  TBili  0.2  /  DBili  x   /  AST  15  /  ALT  13  /  AlkPhos  111  06-03    CBC Full  -  ( 03 Jun 2019 07:53 )  WBC Count : 7.4 K/uL  RBC Count : 2.90 M/uL  Hemoglobin : 8.7 g/dL  Hematocrit : 26.1 %  Platelet Count - Automated : 253 K/uL  Mean Cell Volume : 90.1 fl  Mean Cell Hemoglobin : 29.9 pg  Mean Cell Hemoglobin Concentration : 33.1 gm/dL  Auto Neutrophil # : 4.4 K/uL  Auto Lymphocyte # : 2.1 K/uL  Auto Monocyte # : 0.5 K/uL  Auto Eosinophil # : 0.3 K/uL  Auto Basophil # : 0.0 K/uL  Auto Neutrophil % : 59.5 %  Auto Lymphocyte % : 29.0 %  Auto Monocyte % : 7.0 %  Auto Eosinophil % : 3.9 %  Auto Basophil % : 0.7 %    FOBT: negative    CXR- clear lungs    EKG- normal sinus rhythm; TWI in leads 1 and aVL 06-03    140  |  106  |  76<H>  ----------------------------<  76  4.8   |  17<L>  |  8.13<H>    Ca    6.7<L>      03 Jun 2019 07:53    TPro  7.1  /  Alb  3.7  /  TBili  0.2  /  DBili  x   /  AST  15  /  ALT  13  /  AlkPhos  111  06-03    CBC Full  -  ( 03 Jun 2019 07:53 )  WBC Count : 7.4 K/uL  RBC Count : 2.90 M/uL  Hemoglobin : 8.7 g/dL  Hematocrit : 26.1 %  Platelet Count - Automated : 253 K/uL  Mean Cell Volume : 90.1 fl  Mean Cell Hemoglobin : 29.9 pg  Mean Cell Hemoglobin Concentration : 33.1 gm/dL  Auto Neutrophil # : 4.4 K/uL  Auto Lymphocyte # : 2.1 K/uL  Auto Monocyte # : 0.5 K/uL  Auto Eosinophil # : 0.3 K/uL  Auto Basophil # : 0.0 K/uL  Auto Neutrophil % : 59.5 %  Auto Lymphocyte % : 29.0 %  Auto Monocyte % : 7.0 %  Auto Eosinophil % : 3.9 %  Auto Basophil % : 0.7 %    FOBT: negative    CXR- clear lungs    EKG- normal sinus rhythm; poor r-wave progression, V1-v3.  TWI in leads 1 and aVL

## 2019-06-03 NOTE — H&P ADULT - PROBLEM SELECTOR PLAN 6
DVT ppx: Heparin SubQ q12h  Diet: NPO for now pending cardiac cath and HD catheter placement    Jenelle Woo MD PGY2  Medicine Day Admit Resident  Pager [phone]/Spectra [phone] DVT ppx: SCD's for now for procedures  Diet: NPO for now pending cardiac cath and HD catheter placement    Jenelle Woo MD PGY2  Medicine Day Admit Resident  Pager [phone]/Spectra [phone] Continue atorvastatin 40 mg QD (home medication)

## 2019-06-03 NOTE — CONSULT NOTE ADULT - PROBLEM SELECTOR RECOMMENDATION 5
Hyperphosphatemia and hypocalcemia from CKDV.   - C/w tums 1 tab TID w/ meals  - Monitor Ca and phos levels daily

## 2019-06-03 NOTE — CONSULT NOTE ADULT - PROBLEM SELECTOR RECOMMENDATION 4
Hyperphosphatemia and hypocalcemia from CKDV.   - C/w tums 1 tab TID w/ meals  - Monitor Ca and phos levels daily in setting of advanced CKD.  - C/w bicarb tabs TID  - Check bicarb level daily

## 2019-06-03 NOTE — H&P ADULT - HISTORY OF PRESENT ILLNESS
62 yo male with ESRD not previously on HD (no fistula), CAD s/p stent 2009 and abnormal cath 9/2018, HTN, DMII, hypothyroidism, presents to the ED for cardiac cath. Pt is awaiting renal transplant and needs cardiac cath per his PMD and cardiologist prior to being cleared for transplant surgery. Patient denies any symptoms at this time. No chest pain, palpitations, dyspnea, headaches, or lower extremity edema. Pt and his son state that his bp normally runs high, up to 's. Pt was told he would need to start HD after his cardiac catheterization, while he is waiting for renal transplant.     Per cath from 9/2018:  	LPDA: 40 % stenosis.  	Proximal RCA: The vessel was very small sized. There was a 90 % stenosis.  	Proximal LAD: There was a diffuse 30 % stenosis    In the ED, patient afebrile, T 98, HR 89, BP elevated to 213/83, RR 18, satting 98% on room air. Labs notable for Hgb 8.7 (was 10.5 in 12/2018). FOBT negative. BUN 76/Creatinine 8.31. CXR showed clear lungs.   Patient was given 15 mg total IV hydralazine in ED for bp control, along with 40 mg PO lasix, and his home bp medications. 64 yo male with ESRD not previously on HD (no fistula), CAD s/p stent 2009 and abnormal cath 9/2018 with 90% pRCA lesion (not intervened upon), HTN, DM type 2, and hypothyroidism, presents to the ED for cardiac cath. Pt is awaiting renal transplant and needs cardiac cath per his PMD and cardiologist prior to being cleared for transplant surgery. Patient denies any symptoms at this time. No chest pain, palpitations, dyspnea, headaches, or lower extremity edema. Pt and his son state that his bp normally runs high, up to 's. Pt was told he would need to start HD after his cardiac catheterization, while he is waiting for renal transplant.     Per cath from 9/2018:  	LPDA: 40 % stenosis.  	Proximal RCA: The vessel was very small sized. There was a 90 % stenosis.  	Proximal LAD: There was a diffuse 30 % stenosis    In the ED, patient afebrile, T 98, HR 89, BP elevated to 213/83, RR 18, satting 98% on room air. Labs notable for Hgb 8.7 (was 10.5 in 12/2018). FOBT negative. BUN 76/Creatinine 8.31. CXR showed clear lungs.   Patient was given 15 mg total IV hydralazine in ED for bp control, along with 40 mg PO lasix, and his home bp medications with improvement in the blood pressure. 64 yo male with ESRD not previously on HD (no fistula), CAD s/p stent 2009 and abnormal cath 9/2018 with 90% pRCA lesion (not intervened upon), HTN, DM type 2, and hypothyroidism, presents to the ED for cardiac cath. Pt is awaiting renal transplant and needs cardiac cath per his PMD and cardiologist prior to being cleared for transplant surgery. Patient denies any symptoms at this time. No chest pain, palpitations, dyspnea, headaches, or lower extremity edema. Pt and his son state that his bp normally runs high, up to 's. Pt was told he would need to start HD after his cardiac catheterization, while he is waiting for renal transplant.     Per cath from 9/2018:  	LPDA: 40 % stenosis.  	Proximal RCA: The vessel was very small sized. There was a 90 % stenosis.  	Proximal LAD: There was a diffuse 30 % stenosis    In the ED, patient afebrile, T 98, HR 89, BP elevated to 213/83, RR 18, satting 98% on room air. Labs notable for Hgb 8.7 (was 10.5 in 12/2018). FOBT negative. BUN 76/Creatinine 8.31. CXR showed clear lungs.   Patient was given 15 mg total IV hydralazine in ED for bp control, along with 40 mg PO lasix, and 100mg IV lasix and his home bp medications with improvement in the blood pressure.

## 2019-06-03 NOTE — ED ADULT NURSE NOTE - OBJECTIVE STATEMENT
63 yrs old male with h/o dm and htn present to the ER for cardiac cath. As per son pt is on a kidney transplant list, however he needs cardiac clearance done to  obtain  a kidney. Son reported that pt missed out on getting a kidney because he was not cleared medically. Pt denies complain of chest pain , SOB or palpitations at this time.

## 2019-06-03 NOTE — H&P ADULT - PROBLEM SELECTOR PLAN 1
Patient on renal transplant list- has a kidney available, but prior to transplant needs cardiac clearance with heart cath- planned for this admission.  Patient will need to start new HD after the planned cath- nephrology and cardiology consulted. Plan for IR placement of tunnelled catheter.   - will monitor electrolytes- potassium stable at this time, no evidence of fluid overload (CXR clear).   - continue sodium bicarbonate tablets TID (home medication).   - calcium carbonate supplements TID (per renal reccs- for low calcium) Patient on renal transplant list- has a kidney available, but prior to transplant needs cardiac clearance with heart cath- planned for this admission.  Patient will need to start new HD after the planned cath- nephrology and cardiology consulted. Plan for IR placement of HD catheter.   - will monitor electrolytes- potassium stable at this time, no evidence of fluid overload (CXR clear).   - continue sodium bicarbonate tablets TID (home medication).   - calcium carbonate supplements TID (per renal reccs- for hypocalcemia) Patient on renal transplant list- has a kidney available, but prior to transplant needs cardiac clearance with heart cath- planned for this admission.  Plan for left heart catheterization after initiation of HD that will be tomorrow.  IR to place HD catheter.   - will monitor electrolytes- potassium stable at this time, no evidence of fluid overload (CXR clear).   - continue sodium bicarbonate tablets TID (home medication).   - calcium carbonate supplements TID (per renal reccs- for hypocalcemia)  - Case d/w Dr. Cardenas.

## 2019-06-03 NOTE — H&P ADULT - ASSESSMENT
63M with ESRD not previously on HD, DMII on insulin, CAD, HTN, HLD, hypothyroidism, presenting per advice of MD to get cardiac cath for clearance prior to receiving renal transplant.

## 2019-06-03 NOTE — CONSULT NOTE ADULT - PROBLEM SELECTOR RECOMMENDATION 2
Hgb low to 8 range. On procrit 10k units q2 weeks.   - Check iron panel, ferritin  - Monitor CBC SBP elevated to 200s in ER. Possibly from volume overload.   - Give dose of lasix 100mg IV (only got 40mg PO in ER).   - Will plan for HD with UF  - C/w anti-hypertensives

## 2019-06-03 NOTE — ED ADULT NURSE REASSESSMENT NOTE - NS ED NURSE REASSESS COMMENT FT1
made aware of pt's blood pressure. Pt was given meds for his BP as ordered; will monitor. Pt however remains asymptomatic.

## 2019-06-03 NOTE — H&P ADULT - PROBLEM SELECTOR PLAN 2
/83 in ED, not significantly improved with IV hydralazine and one dose of PO lasix 40 mg.  Per nephrology reccs, will give additional 100 mg IV lasix x 1 at this time.  Once patient initiated on HD it will also help to lower the bp.   Continue other medications: - hydralazine PO 50 mg TID, losartan 25 mg QD, lasix 40 mg BID (all home medications). /83 in ED, not significantly improved with IV hydralazine and one dose of PO lasix 40 mg.  Per nephrology recs, given additional 100 mg IV lasix x 1 with improvement in BP.  Once patient initiated on HD it will also help to lower the bp.   Continue other medications: - hydralazine PO 50 mg TID, losartan 25 mg QD, lasix 40 mg BID (all home medications).

## 2019-06-03 NOTE — ED PROVIDER NOTE - OBJECTIVE STATEMENT
62 yo male with PMH of ESRD not on HD (no fistula), HTN, DMII, presents to the ED sent by his PMD for cardiac cath. Pt is awaiting renal transplant and needs cath per his PMD and cardiologist prior to being cleared for transplant surgery. No symptoms at this time. Pt states he only took his home BP medication a few minutes PTA in the ED. Normally runs 140's-160's systolic. On metoprolol 50mg and hydralazine 50mg. Last cardiac cath 9/18.     Meds: novalog 7units, levemir 8units, lasix 40mg, asa, sodium bicarb, torvastatin 20mg    NKDA  PMD: Shayna Ayala  Card: Bharat Kaplano: Tesfaye Snyder 64 yo male with PMH of ESRD not on HD (no fistula), HTN, DMII, presents to the ED sent by his PMD for cardiac cath. Pt is awaiting renal transplant and needs cath per his PMD and cardiologist prior to being cleared for transplant surgery. Only symptom is dependent peripheral edema after sitting or standing at end of day. Pt states he only took his home BP medication a few minutes PTA in the ED. Normally runs 140's-160's systolic. On metoprolol 50mg and hydralazine 50mg. Last cardiac cath 9/18. Pt told he would likely start HD during this admission.     LPDA: 40 % stenosis.  Proximal RCA: The vessel was very small sized. There was a 90 % stenosis.  Proximal LAD: There was a diffuse 30 %    Meds: novalog 7units, levemir 8units, lasix 40mg, asa, sodium bicarb, torvastatin 20mg    NKDA  PMD: Shayna Ayala  Card: Bharat Izquierdorho: Tesfaye Snyder 64 yo male with PMH of ESRD not on HD (no fistula), CAD s/p stent 2009 and abnormal cath 9/18, HTN, DMII, presents to the ED sent by his PMD for cardiac cath. Pt is awaiting renal transplant and needs cath per his PMD and cardiologist prior to being cleared for transplant surgery. Only symptom is dependent peripheral edema after sitting or standing at end of day. Pt states he only took his home BP medication a few minutes PTA in the ED. Normally runs 140's-160's systolic. On metoprolol 50mg and hydralazine 50mg. Last cardiac cath 9/18. Pt told he would likely start HD during this admission.     LPDA: 40 % stenosis.  Proximal RCA: The vessel was very small sized. There was a 90 % stenosis.  Proximal LAD: There was a diffuse 30 %    Meds: novalog 7units, levemir 8units, lasix 40mg, asa, sodium bicarb, torvastatin 20mg    NKDA  PMD: Shayna Ayala  Card: Berenice Bose and Bharat Maloney  Neprho: Tesfaye Snyder

## 2019-06-03 NOTE — CONSULT NOTE ADULT - PROBLEM SELECTOR RECOMMENDATION 9
CKDV likely 2/2 DM and HTN. Pt planned for LHC. SCr 6-7 range from 3/2019.   - Will likely need to be started on dialysis post LHC.  - Pt prefers peritoneal dialysis. CKDV likely 2/2 DM and HTN. Pt planned for LHC. SCr 6-7 range from 3/2019, and continues to trend up- likely progressive CKD.   - Will likely need to be started on dialysis post LHC.  - Will get IR to place catheter- ideally will want tunneled catheter.   - F/U Hepatitis panel for HD  - HD order placed  - HD consent signed

## 2019-06-03 NOTE — CONSULT NOTE ADULT - SUBJECTIVE AND OBJECTIVE BOX
Date of Admission: 6/3/19    Patient is a 63y old  Male who presents with a chief complaint of needs cardiac cath and new HD (03 Jun 2019 12:39)    HISTORY OF PRESENT ILLNESS:   62 yo male with ESRD not previously on HD (no fistula), CAD s/p stent 2009 and abnormal cath 9/2018, HTN, DMII, hypothyroidism, presents to the ED for cardiac cath. Pt is awaiting renal transplant and needs cardiac cath per his PMD and cardiologist prior to being cleared for transplant surgery. Patient denies any symptoms at this time. No chest pain, palpitations, dyspnea, headaches, or lower extremity edema. Pt was told he would need to start HD after his cardiac catheterization, while he is waiting for renal transplant.     Allergies    No Known Allergies    Intolerances    	    MEDICATIONS:  aspirin enteric coated 81 milliGRAM(s) Oral daily  furosemide    Tablet 40 milliGRAM(s) Oral two times a day  furosemide   IVPB 100 milliGRAM(s) IV Intermittent once  hydrALAZINE 50 milliGRAM(s) Oral three times a day  losartan 25 milliGRAM(s) Oral daily  metoprolol succinate ER 50 milliGRAM(s) Oral daily          calcium carbonate    500 mG (Tums) Chewable 1 Tablet(s) Chew three times a day  pantoprazole    Tablet 40 milliGRAM(s) Oral before breakfast    atorvastatin 40 milliGRAM(s) Oral at bedtime  dextrose 40% Gel 15 Gram(s) Oral once PRN  dextrose 50% Injectable 12.5 Gram(s) IV Push once  dextrose 50% Injectable 25 Gram(s) IV Push once  dextrose 50% Injectable 25 Gram(s) IV Push once  glucagon  Injectable 1 milliGRAM(s) IntraMuscular once PRN  insulin lispro (HumaLOG) corrective regimen sliding scale   SubCutaneous every 6 hours  levothyroxine 50 MICROGram(s) Oral daily    dextrose 5%. 1000 milliLiter(s) IV Continuous <Continuous>  ferrous    sulfate 325 milliGRAM(s) Oral daily  sodium bicarbonate 650 milliGRAM(s) Oral three times a day      PAST MEDICAL & SURGICAL HISTORY:  CRI (Chronic Renal Insufficiency)  CAD (Coronary Artery Disease): with Stents in 06/2009  CAD (Coronary Artery Disease)  Dyslipidemia  Diabetes  Hypertension  No significant past surgical history      FAMILY HISTORY:  No pertinent family history in first degree relatives    REVIEW OF SYSTEMS:    CONSTITUTIONAL: No weakness, fevers or chills  EYES/ENT: No visual changes;  No dysphagia  RESPIRATORY: No cough, wheezing, hemoptysis; No shortness of breath  CARDIOVASCULAR: No chest pain or palpitations; No lower extremity edema  GASTROINTESTINAL: No abdominal or epigastric pain. No nausea, vomiting, or hematemesis  GENITOURINARY: No dysuria, frequency or hematuria  NEUROLOGICAL: No numbness or weakness  SKIN: No itching, burning, rashes, or lesions   All other review of systems is negative unless indicated above.    PHYSICAL EXAM:  T(C): 36.7 (06-03-19 @ 11:04), Max: 36.7 (06-03-19 @ 07:19)  HR: 63 (06-03-19 @ 12:16) (61 - 89)  BP: 149/70 (06-03-19 @ 12:16) (149/70 - 213/83)  RR: 16 (06-03-19 @ 12:16) (16 - 18)  SpO2: 97% (06-03-19 @ 12:16) (96% - 98%)  Wt(kg): --  I&O's Summary      Appearance: Normal	  HEENT:   Normal oral mucosa  Cardiovascular: Normal S1 S2, No JVD, No murmurs, No edema  Respiratory: Lungs clear to auscultation	  Psychiatry: A & O x 3, Mood & affect appropriate  Gastrointestinal:  Soft, Non-tender, + BS	  Skin: No rashes, No ecchymoses, No cyanosis	  Neurologic: Non-focal  Extremities: Normal range of motion, No clubbing, cyanosis        LABS:	 	    CBC Full  -  ( 03 Jun 2019 07:53 )  WBC Count : 7.4 K/uL  Hemoglobin : 8.7 g/dL  Hematocrit : 26.1 %  Platelet Count - Automated : 253 K/uL  Mean Cell Volume : 90.1 fl  Mean Cell Hemoglobin : 29.9 pg  Mean Cell Hemoglobin Concentration : 33.1 gm/dL  Auto Neutrophil # : 4.4 K/uL  Auto Lymphocyte # : 2.1 K/uL  Auto Monocyte # : 0.5 K/uL  Auto Eosinophil # : 0.3 K/uL  Auto Basophil # : 0.0 K/uL  Auto Neutrophil % : 59.5 %  Auto Lymphocyte % : 29.0 %  Auto Monocyte % : 7.0 %  Auto Eosinophil % : 3.9 %  Auto Basophil % : 0.7 %    06-03    140  |  106  |  76<H>  ----------------------------<  76  4.8   |  17<L>  |  8.13<H>    Ca    6.7<L>      03 Jun 2019 07:53    TPro  7.1  /  Alb  3.7  /  TBili  0.2  /  DBili  x   /  AST  15  /  ALT  13  /  AlkPhos  111  06-03    ECG:  	NSR, V1-V2 q waves    PREVIOUS DIAGNOSTIC TESTING:    Cath 9/2018  LM:   --  LM: Normal.  LAD:   --  Proximal LAD: There was a diffuse 30 % stenosis.  CX:   --  Circumflex: Angiography showed mild atherosclerosis with no flow  limiting lesions.  --  OM1: There was a 0 % stenosis at the site of a prior stent.  --  LPDA: There was a 40 % stenosis.  RCA:   --  Proximal RCA: The vessel was very small sized. There was a 90 %  stenosis.  COMPLICATIONS: There were no complications.  SUMMARY:  CORONARY VESSELS: LM: Normal. Proximal LAD: There was a diffuse 30 %  stenosis. Circumflex: Angiography showed mild atherosclerosis with no flow  limiting lesions. OM1: There was a 0 % stenosis at the site of a prior  stent. LPDA: There was a 40 % stenosis. Proximal RCA: The vessel was very  small sized.  CARDIAC STRUCTURES: EF by echo was 60 %.  DIAGNOSTIC RECOMMENDATIONS: Total contrast 18cc. Patient hydrated before  and after procedure. The RCA is extremely small less than 1.0 and  nondominant. There are no discrete lesions in the LAD to correlate with  stress test results. No cardiac contraindications to proceeding towards  transplant.    ASSESSMENT/PLAN: 	  62 yo male with ESRD not previously on HD (no fistula), CAD s/p stent 2009 and abnormal cath 9/2018, HTN, DMII, hypothyroidism, presents to the ED for cardiac cath for renal transplant clearance.    - pt is asymptomatic, euvolemic  - cath 9/2018 with 90% pRCA lesion but it was extremely small and not amenable to PCI -- will discuss with interventionalist if it is amenable to PCI  - will need to start dialysis after LHC - primary team setting up  - c/w asa, statin, metop, losartan, hydralazine       Laurie Colon MD  Cardiology Fellow   [phone]    All Cardiology service information can be found on [email or web address], password: [email or web address]. Date of Admission: 6/3/19    Patient is a 63y old  Male who presents with a chief complaint of needs cardiac cath and new HD (03 Jun 2019 12:39)    HISTORY OF PRESENT ILLNESS:   64 yo male with ESRD not previously on HD (no fistula), CAD s/p stent 2009 and abnormal cath 9/2018, HTN, DMII, hypothyroidism, presents to the ED for cardiac cath. Pt is awaiting renal transplant and needs cardiac cath per his PMD and cardiologist prior to being cleared for transplant surgery. He was last cathed on 9/2018 with 90% pRCA disease (not amenable to PCI), 40% LPDA, 30% LAD. However, he was re-evaluated for renal transplant and since patient is diabetic and previous cath was several months ago, he will need repeat cath for clearance. Patient denies any symptoms at this time. No chest pain, palpitations, dyspnea, headaches, or lower extremity edema. Pt was told he would need to start HD after his cardiac catheterization, while he is waiting for renal transplant.     Allergies    No Known Allergies    Intolerances    	    MEDICATIONS:  aspirin enteric coated 81 milliGRAM(s) Oral daily  furosemide    Tablet 40 milliGRAM(s) Oral two times a day  furosemide   IVPB 100 milliGRAM(s) IV Intermittent once  hydrALAZINE 50 milliGRAM(s) Oral three times a day  losartan 25 milliGRAM(s) Oral daily  metoprolol succinate ER 50 milliGRAM(s) Oral daily          calcium carbonate    500 mG (Tums) Chewable 1 Tablet(s) Chew three times a day  pantoprazole    Tablet 40 milliGRAM(s) Oral before breakfast    atorvastatin 40 milliGRAM(s) Oral at bedtime  dextrose 40% Gel 15 Gram(s) Oral once PRN  dextrose 50% Injectable 12.5 Gram(s) IV Push once  dextrose 50% Injectable 25 Gram(s) IV Push once  dextrose 50% Injectable 25 Gram(s) IV Push once  glucagon  Injectable 1 milliGRAM(s) IntraMuscular once PRN  insulin lispro (HumaLOG) corrective regimen sliding scale   SubCutaneous every 6 hours  levothyroxine 50 MICROGram(s) Oral daily    dextrose 5%. 1000 milliLiter(s) IV Continuous <Continuous>  ferrous    sulfate 325 milliGRAM(s) Oral daily  sodium bicarbonate 650 milliGRAM(s) Oral three times a day      PAST MEDICAL & SURGICAL HISTORY:  CRI (Chronic Renal Insufficiency)  CAD (Coronary Artery Disease): with Stents in 06/2009  CAD (Coronary Artery Disease)  Dyslipidemia  Diabetes  Hypertension  No significant past surgical history      FAMILY HISTORY:  No pertinent family history in first degree relatives    REVIEW OF SYSTEMS:    CONSTITUTIONAL: No weakness, fevers or chills  EYES/ENT: No visual changes;  No dysphagia  RESPIRATORY: No cough, wheezing, hemoptysis; No shortness of breath  CARDIOVASCULAR: No chest pain or palpitations; No lower extremity edema  GASTROINTESTINAL: No abdominal or epigastric pain. No nausea, vomiting, or hematemesis  GENITOURINARY: No dysuria, frequency or hematuria  NEUROLOGICAL: No numbness or weakness  SKIN: No itching, burning, rashes, or lesions   All other review of systems is negative unless indicated above.    PHYSICAL EXAM:  T(C): 36.7 (06-03-19 @ 11:04), Max: 36.7 (06-03-19 @ 07:19)  HR: 63 (06-03-19 @ 12:16) (61 - 89)  BP: 149/70 (06-03-19 @ 12:16) (149/70 - 213/83)  RR: 16 (06-03-19 @ 12:16) (16 - 18)  SpO2: 97% (06-03-19 @ 12:16) (96% - 98%)  Wt(kg): --  I&O's Summary      Appearance: Normal	  HEENT:   Normal oral mucosa  Cardiovascular: Normal S1 S2, No JVD, No murmurs, No edema  Respiratory: Lungs clear to auscultation	  Psychiatry: A & O x 3, Mood & affect appropriate  Gastrointestinal:  Soft, Non-tender, + BS	  Skin: No rashes, No ecchymoses, No cyanosis	  Neurologic: Non-focal  Extremities: Normal range of motion, No clubbing, cyanosis        LABS:	 	    CBC Full  -  ( 03 Jun 2019 07:53 )  WBC Count : 7.4 K/uL  Hemoglobin : 8.7 g/dL  Hematocrit : 26.1 %  Platelet Count - Automated : 253 K/uL  Mean Cell Volume : 90.1 fl  Mean Cell Hemoglobin : 29.9 pg  Mean Cell Hemoglobin Concentration : 33.1 gm/dL  Auto Neutrophil # : 4.4 K/uL  Auto Lymphocyte # : 2.1 K/uL  Auto Monocyte # : 0.5 K/uL  Auto Eosinophil # : 0.3 K/uL  Auto Basophil # : 0.0 K/uL  Auto Neutrophil % : 59.5 %  Auto Lymphocyte % : 29.0 %  Auto Monocyte % : 7.0 %  Auto Eosinophil % : 3.9 %  Auto Basophil % : 0.7 %    06-03    140  |  106  |  76<H>  ----------------------------<  76  4.8   |  17<L>  |  8.13<H>    Ca    6.7<L>      03 Jun 2019 07:53    TPro  7.1  /  Alb  3.7  /  TBili  0.2  /  DBili  x   /  AST  15  /  ALT  13  /  AlkPhos  111  06-03    ECG:  	NSR, V1-V2 q waves    PREVIOUS DIAGNOSTIC TESTING:    Cath 9/2018  LM:   --  LM: Normal.  LAD:   --  Proximal LAD: There was a diffuse 30 % stenosis.  CX:   --  Circumflex: Angiography showed mild atherosclerosis with no flow  limiting lesions.  --  OM1: There was a 0 % stenosis at the site of a prior stent.  --  LPDA: There was a 40 % stenosis.  RCA:   --  Proximal RCA: The vessel was very small sized. There was a 90 %  stenosis.  COMPLICATIONS: There were no complications.  SUMMARY:  CORONARY VESSELS: LM: Normal. Proximal LAD: There was a diffuse 30 %  stenosis. Circumflex: Angiography showed mild atherosclerosis with no flow  limiting lesions. OM1: There was a 0 % stenosis at the site of a prior  stent. LPDA: There was a 40 % stenosis. Proximal RCA: The vessel was very  small sized.  CARDIAC STRUCTURES: EF by echo was 60 %.  DIAGNOSTIC RECOMMENDATIONS: Total contrast 18cc. Patient hydrated before  and after procedure. The RCA is extremely small less than 1.0 and  nondominant. There are no discrete lesions in the LAD to correlate with  stress test results. No cardiac contraindications to proceeding towards  transplant.    ASSESSMENT/PLAN: 	  64 yo male with ESRD not previously on HD (no fistula), CAD s/p stent 2009 and abnormal cath 9/2018, HTN, DMII, hypothyroidism, presents to the ED for cardiac cath for renal transplant clearance.    - pt is asymptomatic, euvolemic  - cath 9/2018 with 90% pRCA lesion but it was extremely small and not amenable to PCI, 30% pLAD, 40% LPDA  - given pt's last cath was >6 mo ago and he is diabetic, risk of disease progression is high, will need repeat cath this admission  - will need to be dialyzed before LHC - discussed with Dr. Bose, primary team to set up  - c/w asa, statin, metop, losartan, hydralazine       Laurie Colon MD  Cardiology Fellow   [phone]    All Cardiology service information can be found on [email or web address], password: [email or web address].

## 2019-06-03 NOTE — H&P ADULT - PROBLEM SELECTOR PLAN 4
- no active symptoms.  - planned cardiac cath procedure today.  - follow up cardiology reccs. - no active symptoms.  - planned cardiac cath procedure this admission, will follow up with cardiology re: timing and reccs  - follow up cardiology reccs. - no active symptoms.  - continue aspirin.   - planned cardiac cath procedure this admission, will follow up with cardiology re: timing and reccs  - follow up cardiology reccs. - no active symptoms.  - cardiology input appreciated.  - continue aspirin, Lipitor, Metoprolol, ARB.   - planned cardiac cath procedure this admission with HD after cath.

## 2019-06-03 NOTE — PROVIDER CONTACT NOTE (OTHER) - ACTION/TREATMENT ORDERED:
NP notified, Pt to be transferred to unit with portable cardiac monitor per NP, Will continue to monitor

## 2019-06-03 NOTE — H&P ADULT - PROBLEM SELECTOR PLAN 8
DVT ppx: SCD's for now for procedures  Diet: NPO for now pending cardiac cath and HD catheter placement    Jenelle Woo MD PGY2  Medicine Day Admit Resident  Pager [phone]/Spectra [phone] DVT ppx: SCD's for now for procedures  Diet: NPO for now pending HD catheter placement    Jenelle Woo MD PGY2  Medicine Day Admit Resident  Pager [phone]/Spectra [phone]

## 2019-06-03 NOTE — ED ADULT NURSE REASSESSMENT NOTE - NS ED NURSE REASSESS COMMENT FT1
Pt returned from interventional radiology after having shiley placed to right chest for dialysis. Vitals signs stable. Will administer meds.

## 2019-06-03 NOTE — H&P ADULT - NSHPREVIEWOFSYSTEMS_GEN_ALL_CORE
General:	No fevers or chills  Skin/Breast: No rashes or lesions  Ophthalmologic: No vision changes  ENMT: No sore throat 	  Respiratory and Thorax: No dyspnea, cough, or wheezing  Cardiovascular: No chest pain, no palpitations, no peripheral edema   Gastrointestinal: no nausea, vomiting, abdominal pain, diarrhea, or bloody stool.   Genitourinary: no urinary symptoms 	  Neurological: No weakness reported  Hematology/Lymphatics: No active bleeding reported. General:	No fevers or chills  Skin/Breast: No rashes or lesions  Ophthalmologic: No vision changes  ENMT: No sore throat. No dysphagia	  Respiratory and Thorax: No dyspnea, cough, or wheezing  Cardiovascular: No chest pain, no palpitations, no orthopnea, no peripheral edema   Gastrointestinal: no nausea, vomiting, abdominal pain, diarrhea, or bloody stool.   Genitourinary: no dysuria 	  Neurological: No weakness reported  Hematology/Lymphatics: No active bleeding reported.  Psych: No depression

## 2019-06-03 NOTE — PROGRESS NOTE ADULT - SUBJECTIVE AND OBJECTIVE BOX
Interventional Radiology Pre-Procedure Note    This is a 63y Male with PMH of ESRD not on HD, CAD s/p stent 2009 and abnormal cath 9/2018, HTN, DMII, hypothyroidism, who is admitted for cardiac cath. Pt requiring HD prior to cardia cath, IR requested for HD catheter placement.     PAST MEDICAL & SURGICAL HISTORY:  CRI (Chronic Renal Insufficiency)  CAD (Coronary Artery Disease): with Stents in 06/2009  CAD (Coronary Artery Disease)  Dyslipidemia  Diabetes  Hypertension  No significant past surgical history     Vital Signs Last 24 Hrs  T(C): 36.7 (03 Jun 2019 11:04), Max: 36.7 (03 Jun 2019 07:19)  T(F): 98 (03 Jun 2019 11:04), Max: 98 (03 Jun 2019 07:19)  HR: 63 (03 Jun 2019 12:16) (61 - 89)  BP: 149/70 (03 Jun 2019 12:16) (149/70 - 213/83)  RR: 16 (03 Jun 2019 12:16) (16 - 18)  SpO2: 97% (03 Jun 2019 12:16) (96% - 98%)    Allergies: No Known Allergies    Physical Exam: Gen: NAD, A&Ox3    Labs:                         8.7    7.4   )-----------( 253      ( 03 Jun 2019 07:53 )             26.1     06-03    140  |  106  |  76<H>  ----------------------------<  76  4.8   |  17<L>  |  8.13<H>    Ca    6.7<L>      03 Jun 2019 07:53    TPro  x   /  Alb  x   /  TBili  x   /  DBili  x   /  AST  x   /  ALT  13  /  AlkPhos  x   06-03    PT/INR - ( 03 Jun 2019 07:53 )   PT: 12.9 sec;   INR: 1.12 ratio         PTT - ( 03 Jun 2019 07:53 )  PTT:26.6 sec    Plan is for non-tunnelled HD catheter placement. Informed consent obtained. All questions and concerns have been addressed at this time.

## 2019-06-03 NOTE — H&P ADULT - PROBLEM SELECTOR PLAN 5
- check A1c in AM.  - pt on levemir 8U qhs, novolog 7/8/7U pre-meals.   - since patient currently NPO, will continue sliding scale insulin q6h. Can reinstate home dose insulin after cardiac cath and HD line placement. - check A1c in AM.  - pt on levemir 8U qhs, novolog 7/8/7U pre-meals.   - since patient currently NPO, will continue sliding scale insulin q6h and give reduced dose of lantus, 5U qhs for tonight. Can reinstate home dose insulin after cardiac cath and HD line placement. - check A1c in AM.  - pt on levemir 8U qhs, novolog 7/8/7U pre-meals.   - since patient currently NPO, will continue sliding scale insulin q6h and give reduced dose of lantus, 5U qhs for tonight. Can reinstate home dose insulin after cardiac cath and HD line placement if no longer NPO. - check A1c in AM.  - pt on levemir 8U qhs, novolog 7/8/7U pre-meals.   - since patient currently NPO, will continue sliding scale insulin q6h and give reduced dose of lantus, 5U qhs for tonight. Can reinstate home dose insulin after HD line placement if no longer NPO.

## 2019-06-03 NOTE — PROVIDER CONTACT NOTE (OTHER) - ACTION/TREATMENT ORDERED:
IR assessed Pt at bedside; dsg reinforced per IR, Ellen site stable at this time, Pt educated not to move next as much; cont to monitor notify team of further changes

## 2019-06-03 NOTE — H&P ADULT - PROBLEM SELECTOR PLAN 3
Hgb 8.7, no active bleeding reported, FOBT negative.  Likely in setting of chronic kidney disease.   - continue ferrous sulfate supplement daily.   - pt on procrit injections as outpatient, continue [id number] per nephrology reccs.   - will check iron studies in AM. Hgb 8.7, no active bleeding reported, FOBT negative.  Likely in setting of chronic kidney disease.   - continue ferrous sulfate supplement daily.   - pt on procrit injections as outpatient, continue [id number] per nephrology.   - will check iron studies in AM.

## 2019-06-03 NOTE — CONSULT NOTE ADULT - SUBJECTIVE AND OBJECTIVE BOX
[facility] DIVISION OF KIDNEY DISEASES AND HYPERTENSION -- INITIAL CONSULT NOTE  --------------------------------------------------------------------------------  HPI:  Pt is a 63yoM w/ CKDV, CAD s/p stent 2009, HTN, DMII, who was sent to the ER for C.     Pt follows w/ Dr. De La Cruz for Renal.  Is awaiting renal transplant. Needs Cardiac clearance prior to being listed.   Has some LE edema.       PAST HISTORY  --------------------------------------------------------------------------------  PAST MEDICAL & SURGICAL HISTORY:  CRI (Chronic Renal Insufficiency)  CAD (Coronary Artery Disease): with Stents in 06/2009  CAD (Coronary Artery Disease)  Dyslipidemia  Diabetes  Hypertension  No significant past surgical history    FAMILY HISTORY:  No pertinent family history in first degree relatives    PAST SOCIAL HISTORY:    ALLERGIES & MEDICATIONS  --------------------------------------------------------------------------------  Allergies    No Known Allergies    Intolerances      Standing Inpatient Medications  aspirin enteric coated 81 milliGRAM(s) Oral daily  atorvastatin 40 milliGRAM(s) Oral at bedtime  dextrose 5%. 1000 milliLiter(s) IV Continuous <Continuous>  dextrose 50% Injectable 12.5 Gram(s) IV Push once  dextrose 50% Injectable 25 Gram(s) IV Push once  dextrose 50% Injectable 25 Gram(s) IV Push once  ferrous    sulfate 325 milliGRAM(s) Oral daily  furosemide    Tablet 40 milliGRAM(s) Oral two times a day  hydrALAZINE 50 milliGRAM(s) Oral three times a day  insulin lispro (HumaLOG) corrective regimen sliding scale   SubCutaneous every 6 hours  levothyroxine 50 MICROGram(s) Oral daily  losartan 25 milliGRAM(s) Oral daily  metoprolol succinate ER 50 milliGRAM(s) Oral daily  pantoprazole    Tablet 40 milliGRAM(s) Oral before breakfast  sodium bicarbonate 650 milliGRAM(s) Oral three times a day    PRN Inpatient Medications  dextrose 40% Gel 15 Gram(s) Oral once PRN  glucagon  Injectable 1 milliGRAM(s) IntraMuscular once PRN      REVIEW OF SYSTEMS  --------------------------------------------------------------------------------  Gen: No weight changes, fatigue, fevers/chills, weakness  Skin: No rashes  Head/Eyes/Ears/Mouth: No headache; Normal hearing; Normal vision w/o blurriness; No sinus pain/discomfort, sore throat  Respiratory: No dyspnea, cough, wheezing, hemoptysis  CV: No chest pain, PND, orthopnea  GI: No abdominal pain, diarrhea, constipation, nausea, vomiting, melena, hematochezia  : No increased frequency, dysuria, hematuria, nocturia  MSK: No joint pain/swelling; no back pain; no edema  Neuro: No dizziness/lightheadedness, weakness, seizures, numbness, tingling  Heme: No easy bruising or bleeding  Endo: No heat/cold intolerance  Psych: No significant nervousness, anxiety, stress, depression    All other systems were reviewed and are negative, except as noted.    VITALS/PHYSICAL EXAM  --------------------------------------------------------------------------------  T(C): 36.7 (06-03-19 @ 07:19), Max: 36.7 (06-03-19 @ 07:19)  HR: 89 (06-03-19 @ 07:19) (89 - 89)  BP: 197/88 (06-03-19 @ 09:38) (197/88 - 213/83)  RR: 18 (06-03-19 @ 07:19) (18 - 18)  SpO2: 96% (06-03-19 @ 07:19) (96% - 96%)  Wt(kg): --  Height (cm): 177.8 (06-03-19 @ 07:19)  Weight (kg): 74.8 (06-03-19 @ 07:19)  BMI (kg/m2): 23.7 (06-03-19 @ 07:19)  BSA (m2): 1.92 (06-03-19 @ 07:19)      Physical Exam:  	Gen: NAD, well-appearing  	HEENT: PERRL, supple neck, clear oropharynx  	Pulm: CTA B/L  	CV: RRR, S1S2; no rub  	Back: No spinal or CVA tenderness; no sacral edema  	Abd: +BS, soft, nontender/nondistended  	: No suprapubic tenderness  	UE: Warm, FROM, no clubbing, intact strength; no edema; no asterixis  	LE: Warm, FROM, no clubbing, intact strength; no edema  	Neuro: No focal deficits, intact gait  	Psych: Normal affect and mood  	Skin: Warm, without rashes  	Vascular access:    LABS/STUDIES  --------------------------------------------------------------------------------              8.7    7.4   >-----------<  253      [06-03-19 @ 07:53]              26.1     140  |  106  |  76  ----------------------------<  76      [06-03-19 @ 07:53]  4.8   |  17  |  8.13        Ca     6.7     [06-03-19 @ 07:53]    TPro  7.1  /  Alb  3.7  /  TBili  0.2  /  DBili  x   /  AST  15  /  ALT  13  /  AlkPhos  111  [06-03-19 @ 07:53]    PT/INR: PT 12.9 , INR 1.12       [06-03-19 @ 07:53]  PTT: 26.6       [06-03-19 @ 07:53]      Creatinine Trend:  SCr 8.13 [06-03 @ 07:53]    Urinalysis - [07-29-15 @ 19:50]      Color Yellow / Appearance Clear / SG 1.010 / pH 7.0      Gluc 250 / Ketone Negative  / Bili Negative / Urobili Normal       Blood Negative / Protein 100 / Leuk Est Negative / Nitrite Negative      RBC 1 / WBC 0 / Hyaline 0 / Gran  / Sq Epi  / Non Sq Epi 0 / Bacteria Negative      HbA1c 7.0      [03-13-17 @ 19:00]    HBsAb >1000.0      [03-13-17 @ 19:00]  HBsAb Reactive      [03-13-17 @ 19:00]  HBsAg Nonreact      [03-13-17 @ 19:00]  HBcAb Reactive      [03-13-17 @ 19:00]  HCV 0.12, Nonreact      [03-13-17 @ 19:00]  HIV Nonreact      [03-13-17 @ 19:00] [facility] DIVISION OF KIDNEY DISEASES AND HYPERTENSION -- INITIAL CONSULT NOTE  --------------------------------------------------------------------------------  HPI:  Pt is a 63yoM w/ CKDV, CAD s/p stent 2009, HTN, DMII, who was sent to the ER for C.     Pt follows w/ Dr. De La Cruz for Renal.  Is awaiting renal transplant. Needs Cardiac clearance prior to being listed.   Has some LE edema, but otherwise currently feeling okay.        PAST HISTORY  --------------------------------------------------------------------------------  PAST MEDICAL & SURGICAL HISTORY:  CRI (Chronic Renal Insufficiency)  CAD (Coronary Artery Disease): with Stents in 06/2009  CAD (Coronary Artery Disease)  Dyslipidemia  Diabetes  Hypertension  No significant past surgical history    FAMILY HISTORY:  No pertinent family history in first degree relatives    PAST SOCIAL HISTORY:    ALLERGIES & MEDICATIONS  --------------------------------------------------------------------------------  Allergies    No Known Allergies    Intolerances      Standing Inpatient Medications  aspirin enteric coated 81 milliGRAM(s) Oral daily  atorvastatin 40 milliGRAM(s) Oral at bedtime  dextrose 5%. 1000 milliLiter(s) IV Continuous <Continuous>  dextrose 50% Injectable 12.5 Gram(s) IV Push once  dextrose 50% Injectable 25 Gram(s) IV Push once  dextrose 50% Injectable 25 Gram(s) IV Push once  ferrous    sulfate 325 milliGRAM(s) Oral daily  furosemide    Tablet 40 milliGRAM(s) Oral two times a day  hydrALAZINE 50 milliGRAM(s) Oral three times a day  insulin lispro (HumaLOG) corrective regimen sliding scale   SubCutaneous every 6 hours  levothyroxine 50 MICROGram(s) Oral daily  losartan 25 milliGRAM(s) Oral daily  metoprolol succinate ER 50 milliGRAM(s) Oral daily  pantoprazole    Tablet 40 milliGRAM(s) Oral before breakfast  sodium bicarbonate 650 milliGRAM(s) Oral three times a day    PRN Inpatient Medications  dextrose 40% Gel 15 Gram(s) Oral once PRN  glucagon  Injectable 1 milliGRAM(s) IntraMuscular once PRN      REVIEW OF SYSTEMS  --------------------------------------------------------------------------------  Gen: No weight changes, fatigue, fevers/chills  Skin: No rashes  Head/Eyes/Ears/Mouth: No headache  Respiratory: No dyspnea, cough  CV: No chest pain  GI: No abdominal pain, diarrhea  : No increased frequency  MSK: No joint pain/swelling  Neuro: No dizziness  Heme: No easy bruising or bleeding  Endo: No heat/cold intolerance  Psych: No significant nervousness    All other systems were reviewed and are negative, except as noted.    VITALS/PHYSICAL EXAM  --------------------------------------------------------------------------------  T(C): 36.7 (06-03-19 @ 07:19), Max: 36.7 (06-03-19 @ 07:19)  HR: 89 (06-03-19 @ 07:19) (89 - 89)  BP: 197/88 (06-03-19 @ 09:38) (197/88 - 213/83)  RR: 18 (06-03-19 @ 07:19) (18 - 18)  SpO2: 96% (06-03-19 @ 07:19) (96% - 96%)  Wt(kg): --  Height (cm): 177.8 (06-03-19 @ 07:19)  Weight (kg): 74.8 (06-03-19 @ 07:19)  BMI (kg/m2): 23.7 (06-03-19 @ 07:19)  BSA (m2): 1.92 (06-03-19 @ 07:19)      Physical Exam:  	Gen: NAD, well-appearing  	HEENT: anicteric, +JVP  	Pulm: CTA B/L  	CV: RRR, S1S2; no rub  	Back: No spinal or CVA tenderness; no sacral edema  	Abd: +BS, soft, nontender/nondistended  	: No suprapubic tenderness  	UE: Warm, FROM, no edema  	LE: Warm, FROM, no edema  	Neuro: follows commands  	Psych: Normal affect and mood  	Skin: Warm, without rashes    LABS/STUDIES  --------------------------------------------------------------------------------              8.7    7.4   >-----------<  253      [06-03-19 @ 07:53]              26.1     140  |  106  |  76  ----------------------------<  76      [06-03-19 @ 07:53]  4.8   |  17  |  8.13        Ca     6.7     [06-03-19 @ 07:53]    TPro  7.1  /  Alb  3.7  /  TBili  0.2  /  DBili  x   /  AST  15  /  ALT  13  /  AlkPhos  111  [06-03-19 @ 07:53]    PT/INR: PT 12.9 , INR 1.12       [06-03-19 @ 07:53]  PTT: 26.6       [06-03-19 @ 07:53]      Creatinine Trend:  SCr 8.13 [06-03 @ 07:53]    Urinalysis - [07-29-15 @ 19:50]      Color Yellow / Appearance Clear / SG 1.010 / pH 7.0      Gluc 250 / Ketone Negative  / Bili Negative / Urobili Normal       Blood Negative / Protein 100 / Leuk Est Negative / Nitrite Negative      RBC 1 / WBC 0 / Hyaline 0 / Gran  / Sq Epi  / Non Sq Epi 0 / Bacteria Negative      HbA1c 7.0      [03-13-17 @ 19:00]    HBsAb >1000.0      [03-13-17 @ 19:00]  HBsAb Reactive      [03-13-17 @ 19:00]  HBsAg Nonreact      [03-13-17 @ 19:00]  HBcAb Reactive      [03-13-17 @ 19:00]  HCV 0.12, Nonreact      [03-13-17 @ 19:00]  HIV Nonreact      [03-13-17 @ 19:00]

## 2019-06-03 NOTE — CONSULT NOTE ADULT - SUBJECTIVE AND OBJECTIVE BOX
VASCULAR SURGERY SERVICE (pager - #[phone]) - CONSULT NOTE  --------------------------------------------------------------------------------------------      Patient is a 63y old  Male who presents with a chief complaint of needs cardiac cath and new HD (03 Jun 2019 15:58)      HPI:   62 yo male with ESRD not previously on HD (no fistula), CAD s/p stent 2009 and abnormal cath 9/2018 with 90% pRCA lesion (not intervened upon), HTN, DM type 2, and hypothyroidism, presents to the ED for cardiac cath. Pt is awaiting renal transplant and needs cardiac cath per his PMD and cardiologist prior to being cleared for transplant surgery. Patient denies any symptoms at this time. No chest pain, palpitations, dyspnea, headaches, or lower extremity edema. Pt and his son state that his bp normally runs high, up to 's. Pt was told he would need to start HD after his cardiac catheterization, while he is waiting for renal transplant.     Per cath from 9/2018:  	LPDA: 40 % stenosis.  	Proximal RCA: The vessel was very small sized. There was a 90 % stenosis.  	Proximal LAD: There was a diffuse 30 % stenosis    In the ED, patient afebrile, T 98, HR 89, BP elevated to 213/83, RR 18, satting 98% on room air. Labs notable for Hgb 8.7 (was 10.5 in 12/2018). FOBT negative. BUN 76/Creatinine 8.31. CXR showed clear lungs.   Patient was given 15 mg total IV hydralazine in ED for bp control, along with 40 mg PO lasix, and 100mg IV lasix and his home bp medications with improvement in the blood pressure. (03 Jun 2019 15:58)    Vascular surgery consulted for AVF placement.     ROS: 10-system review is otherwise negative except HPI above.      PAST MEDICAL & SURGICAL HISTORY:  CRI (Chronic Renal Insufficiency)  CAD (Coronary Artery Disease): with Stents in 06/2009  CAD (Coronary Artery Disease)  Dyslipidemia  Diabetes  Hypertension  No significant past surgical history    FAMILY HISTORY:  No pertinent family history in first degree relatives    SOCIAL HISTORY:  denied toxic habits x3     ALLERGIES: No Known Allergies      HOME MEDICATIONS:  aspirin 81 mg oral tablet: 1 tab(s) orally once a day (03 Jun 2019 09:50)  atorvastatin 40 mg oral tablet: 1 tab(s) orally once a day (03 Jun 2019 09:50)  ferrous sulfate 325 mg (65 mg elemental iron) oral tablet: 1 tab(s) orally once a day (03 Jun 2019 09:50)  hydrALAZINE 50 mg oral tablet: 1 tab(s) orally 3 times a day (03 Jun 2019 09:50)  Lasix 40 mg oral tablet: 1 tab(s) orally 2 times a day (03 Jun 2019 09:50)  Levemir FlexPen 100 units/mL subcutaneous solution: 8 unit(s) subcutaneous once a day (at bedtime) (03 Jun 2019 09:50)  levothyroxine 50 mcg (0.05 mg) oral tablet: 1 tab(s) orally once a day (03 Jun 2019 09:50)  losartan 25 mg oral tablet: 1 tab(s) orally once a day (03 Jun 2019 09:50)  NovoLOG FlexPen 100 units/mL injectable solution: 7 unit(s) injectable 2 times a day in the am and pm (03 Jun 2019 09:50)  NovoLOG FlexPen 100 units/mL injectable solution: 8 unit(s) injectable, at lunch (03 Jun 2019 09:50)  omeprazole 20 mg oral delayed release tablet: 1 tab(s) orally once a day (03 Jun 2019 09:50)  sodium bicarbonate 650 mg oral tablet: 1 tab(s) orally 3 times a day (03 Jun 2019 09:50)  Toprol-XL 50 mg oral tablet, extended release: 1 tab(s) orally once a day (03 Jun 2019 09:50)      CURRENT MEDICATIONS  MEDICATIONS (STANDING): aspirin enteric coated 81 milliGRAM(s) Oral daily  atorvastatin 40 milliGRAM(s) Oral at bedtime  calcium carbonate    500 mG (Tums) Chewable 1 Tablet(s) Chew three times a day  dextrose 5%. 1000 milliLiter(s) IV Continuous <Continuous>  dextrose 50% Injectable 12.5 Gram(s) IV Push once  dextrose 50% Injectable 25 Gram(s) IV Push once  dextrose 50% Injectable 25 Gram(s) IV Push once  ferrous    sulfate 325 milliGRAM(s) Oral daily  furosemide    Tablet 40 milliGRAM(s) Oral two times a day  hydrALAZINE 50 milliGRAM(s) Oral three times a day  insulin glargine Injectable (LANTUS) 5 Unit(s) SubCutaneous at bedtime  insulin lispro (HumaLOG) corrective regimen sliding scale   SubCutaneous every 6 hours  levothyroxine 50 MICROGram(s) Oral daily  losartan 25 milliGRAM(s) Oral daily  metoprolol succinate ER 50 milliGRAM(s) Oral daily  pantoprazole    Tablet 40 milliGRAM(s) Oral before breakfast  sodium bicarbonate 650 milliGRAM(s) Oral three times a day    MEDICATIONS (PRN):dextrose 40% Gel 15 Gram(s) Oral once PRN Blood Glucose LESS THAN 70 milliGRAM(s)/deciliter  glucagon  Injectable 1 milliGRAM(s) IntraMuscular once PRN Glucose LESS THAN 70 milligrams/deciliter  sodium chloride 0.9% lock flush 10 milliLiter(s) IV Push every 1 hour PRN Pre/post blood products, medications, blood draw, and to maintain line patency    --------------------------------------------------------------------------------------------    Vitals:   T(C): 36.6 (06-03-19 @ 16:38), Max: 36.7 (06-03-19 @ 07:19)  HR: 64 (06-03-19 @ 16:38) (61 - 89)  BP: 159/68 (06-03-19 @ 16:38) (149/70 - 213/83)  RR: 20 (06-03-19 @ 16:38) (16 - 20)  SpO2: 98% (06-03-19 @ 16:38) (96% - 98%)  CAPILLARY BLOOD GLUCOSE      POCT Blood Glucose.: 73 mg/dL (03 Jun 2019 13:07)    CAPILLARY BLOOD GLUCOSE      POCT Blood Glucose.: 73 mg/dL (03 Jun 2019 13:07)      Height (cm): 177.8 (06-03 @ 07:19)  Weight (kg): 74.8 (06-03 @ 07:19)  BMI (kg/m2): 23.7 (06-03 @ 07:19)  BSA (m2): 1.92 (06-03 @ 07:19)    PHYSICAL EXAM:   General: NAD  HEENT: Normocephalic, atraumatic, EOMI, PEERLA.   Chest: R permacath placed.   Cardiac: RRR  Respiratory: Breathing comfortably on RA  Abdomen: Soft, non-distended, non-tender   Groin: Normal appearing  Ext: palp radial b/l UE, b/l DP palp in Lower Extrem.       --------------------------------------------------------------------------------------------    LABS  CBC (06-03 @ 07:53)                              8.7<L>                         7.4     )----------------(  253        59.5  % Neutrophils, 29.0  % Lymphocytes, ANC: 4.4                                 26.1<L>    BMP (06-03 @ 07:53)             140     |  106     |  76<H> 		Ca++ --      Ca 6.7<L>             ---------------------------------( 76    		Mg --                 4.8     |  17<L>   |  8.13<H>			Ph --        LFTs (06-03 @ 12:51)      TPro -- / Alb -- / TBili -- / DBili -- / AST -- / ALT 13 / AlkPhos --  LFTs (06-03 @ 07:53)      TPro 7.1 / Alb 3.7 / TBili 0.2 / DBili -- / AST 15 / ALT 13 / AlkPhos 111    Coags (06-03 @ 07:53)  aPTT 26.6<L> / INR 1.12 / PT 12.9          --------------------------------------------------------------------------------------------    MICROBIOLOGY      --------------------------------------------------------------------------------------------    IMAGING

## 2019-06-03 NOTE — ED PROVIDER NOTE - CLINICAL SUMMARY MEDICAL DECISION MAKING FREE TEXT BOX
Sent for cardiac cath prior to clearance for renal transplant; Asymptomatic at this time. Elevated BP in setting of taking morning BP meds immediately PTA. Will recheck BP, obtain pre-cath labs, call cardiology. Sent for cardiac cath prior to clearance for renal transplant; Asymptomatic at this time. Elevated BP in setting of taking morning BP meds immediately PTA. Will recheck BP, obtain pre-cath labs, call cardiology.  tanvir pt w chronic renal failure awainting transplant - here for cath for cardiac clearnace - no cp no sob no anginal euiqualient -- last cath with ?80% stenosis of lad -

## 2019-06-03 NOTE — H&P ADULT - NSICDXPASTMEDICALHX_GEN_ALL_CORE_FT
PAST MEDICAL HISTORY:  CAD (Coronary Artery Disease)     CAD (Coronary Artery Disease) with Stents in 06/2009    CRI (Chronic Renal Insufficiency)     Diabetes     Dyslipidemia     Hypertension

## 2019-06-03 NOTE — PROGRESS NOTE ADULT - SUBJECTIVE AND OBJECTIVE BOX
Vascular & Interventional Radiology Post-Procedure Note    Pre-Procedure Diagnosis:  ESRD  Post-Procedure Diagnosis: Same as pre.  Indications for Procedure:  new to hemodialysis    : SHAWN Salvador  Assistant(s):    Procedure Details/Findings:   15 cm shiley catheter placement via R IJ.    catheter ready for use.    Complications:  none  Estimated Blood Loss: Minimal  Specimen:  none  Contrast:  none  Sedation:  per anesthesia   Patient Condition/Disposition:  floor    Plan:   Catheter ready for use.

## 2019-06-03 NOTE — CONSULT NOTE ADULT - PROBLEM SELECTOR RECOMMENDATION 3
in setting of advacned CKD.  - C/w bicarb tabs TID  - Check bicarb level daily Hgb low to 8 range. On procrit 10k units q2 weeks.   - Check iron panel, ferritin  - Monitor CBC

## 2019-06-03 NOTE — H&P ADULT - NSHPPHYSICALEXAM_GEN_ALL_CORE
Vital Signs Last 24 Hrs  T(C): 36.7 (03 Jun 2019 11:04), Max: 36.7 (03 Jun 2019 07:19)  T(F): 98 (03 Jun 2019 11:04), Max: 98 (03 Jun 2019 07:19)  HR: 63 (03 Jun 2019 12:16) (61 - 89)  BP: 149/70 (03 Jun 2019 12:16) (149/70 - 213/83)  BP(mean): --  RR: 16 (03 Jun 2019 12:16) (16 - 18)  SpO2: 97% (03 Jun 2019 12:16) (96% - 98%)    GENERAL: NAD  HEAD:  Atraumatic, Normocephalic  EYES: EOMI, conjunctiva and sclera clear  CHEST/LUNG: Clear to auscultation bilaterally; No wheeze  HEART: Regular rate and rhythm; No murmurs, rubs, or gallops  ABDOMEN: Soft, Nontender, Nondistended; Bowel sounds present  EXTREMITIES:  2+ Peripheral Pulses, No clubbing, cyanosis, or edema  PSYCH: AAOx3  NEUROLOGY: non-focal  SKIN: No rashes or lesions Vital Signs Last 24 Hrs  T(C): 36.7 (03 Jun 2019 11:04), Max: 36.7 (03 Jun 2019 07:19)  T(F): 98 (03 Jun 2019 11:04), Max: 98 (03 Jun 2019 07:19)  HR: 63 (03 Jun 2019 12:16) (61 - 89)  BP: 149/70 (03 Jun 2019 12:16) (149/70 - 213/83)  BP(mean): --  RR: 16 (03 Jun 2019 12:16) (16 - 18)  SpO2: 97% (03 Jun 2019 12:16) (96% - 98%)    GENERAL: NAD  HEAD:  Atraumatic, Normocephalic  EYES: PERRL, EOMI, conjunctiva and sclera clear  CHEST/LUNG: Clear to auscultation bilaterally; No wheeze  HEART: Regular rate and rhythm; No murmurs, rubs, or gallops  ABDOMEN: Soft, Nontender, Nondistended; Bowel sounds present  EXTREMITIES:  2+ Peripheral Pulses, No clubbing, cyanosis, or edema  PSYCH: AAOx3  NEUROLOGY: CN II-XII grossly intact  SKIN: No rashes or lesions

## 2019-06-04 DIAGNOSIS — N18.6 END STAGE RENAL DISEASE: ICD-10-CM

## 2019-06-04 LAB
ANION GAP SERPL CALC-SCNC: 18 MMOL/L — HIGH (ref 5–17)
BASOPHILS # BLD AUTO: 0.04 K/UL — SIGNIFICANT CHANGE UP (ref 0–0.2)
BASOPHILS NFR BLD AUTO: 0.6 % — SIGNIFICANT CHANGE UP (ref 0–2)
BUN SERPL-MCNC: 85 MG/DL — HIGH (ref 7–23)
CALCIUM SERPL-MCNC: 6.5 MG/DL — CRITICAL LOW (ref 8.4–10.5)
CHLORIDE SERPL-SCNC: 98 MMOL/L — SIGNIFICANT CHANGE UP (ref 96–108)
CO2 SERPL-SCNC: 18 MMOL/L — LOW (ref 22–31)
CREAT SERPL-MCNC: 8.89 MG/DL — HIGH (ref 0.5–1.3)
EOSINOPHIL # BLD AUTO: 0.23 K/UL — SIGNIFICANT CHANGE UP (ref 0–0.5)
EOSINOPHIL NFR BLD AUTO: 3.5 % — SIGNIFICANT CHANGE UP (ref 0–6)
FERRITIN SERPL-MCNC: 99 NG/ML — SIGNIFICANT CHANGE UP (ref 30–400)
GAMMA INTERFERON BACKGROUND BLD IA-ACNC: 0.02 IU/ML — SIGNIFICANT CHANGE UP
GLUCOSE BLDC GLUCOMTR-MCNC: 103 MG/DL — HIGH (ref 70–99)
GLUCOSE BLDC GLUCOMTR-MCNC: 148 MG/DL — HIGH (ref 70–99)
GLUCOSE BLDC GLUCOMTR-MCNC: 156 MG/DL — HIGH (ref 70–99)
GLUCOSE BLDC GLUCOMTR-MCNC: 183 MG/DL — HIGH (ref 70–99)
GLUCOSE BLDC GLUCOMTR-MCNC: 195 MG/DL — HIGH (ref 70–99)
GLUCOSE BLDC GLUCOMTR-MCNC: 197 MG/DL — HIGH (ref 70–99)
GLUCOSE SERPL-MCNC: 103 MG/DL — HIGH (ref 70–99)
HBA1C BLD-MCNC: 6 % — HIGH (ref 4–5.6)
HCT VFR BLD CALC: 24.2 % — LOW (ref 39–50)
HGB BLD-MCNC: 7.6 G/DL — LOW (ref 13–17)
IMM GRANULOCYTES NFR BLD AUTO: 0.5 % — SIGNIFICANT CHANGE UP (ref 0–1.5)
IRON SATN MFR SERPL: 13 % — LOW (ref 16–55)
IRON SATN MFR SERPL: 26 UG/DL — LOW (ref 45–165)
LYMPHOCYTES # BLD AUTO: 1.32 K/UL — SIGNIFICANT CHANGE UP (ref 1–3.3)
LYMPHOCYTES # BLD AUTO: 20.4 % — SIGNIFICANT CHANGE UP (ref 13–44)
M TB IFN-G BLD-IMP: NEGATIVE — SIGNIFICANT CHANGE UP
M TB IFN-G CD4+ BCKGRND COR BLD-ACNC: -0.01 IU/ML — SIGNIFICANT CHANGE UP
M TB IFN-G CD4+CD8+ BCKGRND COR BLD-ACNC: -0.01 IU/ML — SIGNIFICANT CHANGE UP
MAGNESIUM SERPL-MCNC: 2.3 MG/DL — SIGNIFICANT CHANGE UP (ref 1.6–2.6)
MCHC RBC-ENTMCNC: 28.3 PG — SIGNIFICANT CHANGE UP (ref 27–34)
MCHC RBC-ENTMCNC: 31.4 GM/DL — LOW (ref 32–36)
MCV RBC AUTO: 90 FL — SIGNIFICANT CHANGE UP (ref 80–100)
MONOCYTES # BLD AUTO: 0.53 K/UL — SIGNIFICANT CHANGE UP (ref 0–0.9)
MONOCYTES NFR BLD AUTO: 8.2 % — SIGNIFICANT CHANGE UP (ref 2–14)
NEUTROPHILS # BLD AUTO: 4.33 K/UL — SIGNIFICANT CHANGE UP (ref 1.8–7.4)
NEUTROPHILS NFR BLD AUTO: 66.8 % — SIGNIFICANT CHANGE UP (ref 43–77)
PHOSPHATE SERPL-MCNC: 6.4 MG/DL — HIGH (ref 2.5–4.5)
PLATELET # BLD AUTO: 219 K/UL — SIGNIFICANT CHANGE UP (ref 150–400)
POTASSIUM SERPL-MCNC: 4.4 MMOL/L — SIGNIFICANT CHANGE UP (ref 3.5–5.3)
POTASSIUM SERPL-SCNC: 4.4 MMOL/L — SIGNIFICANT CHANGE UP (ref 3.5–5.3)
QUANT TB PLUS MITOGEN MINUS NIL: 6.12 IU/ML — SIGNIFICANT CHANGE UP
RBC # BLD: 2.69 M/UL — LOW (ref 4.2–5.8)
RBC # BLD: 2.69 M/UL — LOW (ref 4.2–5.8)
RBC # FLD: 13.6 % — SIGNIFICANT CHANGE UP (ref 10.3–14.5)
RETICS #: 17.2 K/UL — LOW (ref 25–125)
RETICS/RBC NFR: 0.6 % — SIGNIFICANT CHANGE UP (ref 0.5–2.5)
SODIUM SERPL-SCNC: 134 MMOL/L — LOW (ref 135–145)
TIBC SERPL-MCNC: 205 UG/DL — LOW (ref 220–430)
UIBC SERPL-MCNC: 179 UG/DL — SIGNIFICANT CHANGE UP (ref 110–370)
WBC # BLD: 6.48 K/UL — SIGNIFICANT CHANGE UP (ref 3.8–10.5)
WBC # FLD AUTO: 6.48 K/UL — SIGNIFICANT CHANGE UP (ref 3.8–10.5)

## 2019-06-04 PROCEDURE — 99233 SBSQ HOSP IP/OBS HIGH 50: CPT

## 2019-06-04 PROCEDURE — 99233 SBSQ HOSP IP/OBS HIGH 50: CPT | Mod: GC

## 2019-06-04 PROCEDURE — 90935 HEMODIALYSIS ONE EVALUATION: CPT | Mod: GC

## 2019-06-04 PROCEDURE — 93970 EXTREMITY STUDY: CPT | Mod: 26

## 2019-06-04 RX ORDER — IRON SUCROSE 20 MG/ML
200 INJECTION, SOLUTION INTRAVENOUS EVERY 24 HOURS
Refills: 0 | Status: COMPLETED | OUTPATIENT
Start: 2019-06-04 | End: 2019-06-08

## 2019-06-04 RX ORDER — CALCIUM CARBONATE 500(1250)
2 TABLET ORAL THREE TIMES A DAY
Refills: 0 | Status: DISCONTINUED | OUTPATIENT
Start: 2019-06-04 | End: 2019-06-11

## 2019-06-04 RX ORDER — FERROUS SULFATE 325(65) MG
325 TABLET ORAL DAILY
Refills: 0 | Status: DISCONTINUED | OUTPATIENT
Start: 2019-06-04 | End: 2019-06-05

## 2019-06-04 RX ADMIN — Medication 325 MILLIGRAM(S): at 12:32

## 2019-06-04 RX ADMIN — Medication 1 TABLET(S): at 06:08

## 2019-06-04 RX ADMIN — CHLORHEXIDINE GLUCONATE 1 APPLICATION(S): 213 SOLUTION TOPICAL at 12:56

## 2019-06-04 RX ADMIN — Medication 50 MILLIGRAM(S): at 21:49

## 2019-06-04 RX ADMIN — Medication 50 MILLIGRAM(S): at 06:08

## 2019-06-04 RX ADMIN — INSULIN GLARGINE 5 UNIT(S): 100 INJECTION, SOLUTION SUBCUTANEOUS at 21:51

## 2019-06-04 RX ADMIN — LOSARTAN POTASSIUM 25 MILLIGRAM(S): 100 TABLET, FILM COATED ORAL at 12:32

## 2019-06-04 RX ADMIN — Medication 81 MILLIGRAM(S): at 12:31

## 2019-06-04 RX ADMIN — Medication 40 MILLIGRAM(S): at 17:59

## 2019-06-04 RX ADMIN — Medication 650 MILLIGRAM(S): at 06:08

## 2019-06-04 RX ADMIN — Medication 50 MILLIGRAM(S): at 12:40

## 2019-06-04 RX ADMIN — Medication 50 MICROGRAM(S): at 06:09

## 2019-06-04 RX ADMIN — Medication 2: at 01:05

## 2019-06-04 RX ADMIN — PANTOPRAZOLE SODIUM 40 MILLIGRAM(S): 20 TABLET, DELAYED RELEASE ORAL at 06:09

## 2019-06-04 RX ADMIN — Medication 650 MILLIGRAM(S): at 12:32

## 2019-06-04 RX ADMIN — Medication 50 MILLIGRAM(S): at 10:55

## 2019-06-04 RX ADMIN — Medication 40 MILLIGRAM(S): at 06:09

## 2019-06-04 RX ADMIN — ATORVASTATIN CALCIUM 40 MILLIGRAM(S): 80 TABLET, FILM COATED ORAL at 21:49

## 2019-06-04 RX ADMIN — Medication 2 TABLET(S): at 21:50

## 2019-06-04 RX ADMIN — Medication 1 TABLET(S): at 12:33

## 2019-06-04 RX ADMIN — Medication 325 MILLIGRAM(S): at 21:50

## 2019-06-04 RX ADMIN — IRON SUCROSE 110 MILLIGRAM(S): 20 INJECTION, SOLUTION INTRAVENOUS at 19:38

## 2019-06-04 NOTE — PROVIDER CONTACT NOTE (OTHER) - ACTION/TREATMENT ORDERED:
NP aware-BP trending down, continue to monitor pt NP aware-BP trending down so no intervention needed at this time, continue to monitor pt

## 2019-06-04 NOTE — PROGRESS NOTE ADULT - SUBJECTIVE AND OBJECTIVE BOX
[facility] DIVISION OF KIDNEY DISEASES AND HYPERTENSION -- FOLLOW UP NOTE  --------------------------------------------------------------------------------  Chief Complaint:  CKD    24 hour events/subjective:  Seen on HD. Tolerating well.       PAST HISTORY  --------------------------------------------------------------------------------  No significant changes to PMH, PSH, FHx, SHx, unless otherwise noted    ALLERGIES & MEDICATIONS  --------------------------------------------------------------------------------  Allergies    No Known Allergies    Intolerances      Standing Inpatient Medications  aspirin enteric coated 81 milliGRAM(s) Oral daily  atorvastatin 40 milliGRAM(s) Oral at bedtime  calcium carbonate    500 mG (Tums) Chewable 1 Tablet(s) Chew three times a day  chlorhexidine 4% Liquid 1 Application(s) Topical <User Schedule>  dextrose 5%. 1000 milliLiter(s) IV Continuous <Continuous>  dextrose 50% Injectable 12.5 Gram(s) IV Push once  dextrose 50% Injectable 25 Gram(s) IV Push once  dextrose 50% Injectable 25 Gram(s) IV Push once  ferrous    sulfate 325 milliGRAM(s) Oral daily  furosemide    Tablet 40 milliGRAM(s) Oral two times a day  hydrALAZINE 50 milliGRAM(s) Oral three times a day  insulin glargine Injectable (LANTUS) 5 Unit(s) SubCutaneous at bedtime  insulin lispro (HumaLOG) corrective regimen sliding scale   SubCutaneous every 6 hours  levothyroxine 50 MICROGram(s) Oral daily  losartan 25 milliGRAM(s) Oral daily  metoprolol succinate ER 50 milliGRAM(s) Oral daily  pantoprazole    Tablet 40 milliGRAM(s) Oral before breakfast  sodium bicarbonate 650 milliGRAM(s) Oral three times a day    PRN Inpatient Medications  dextrose 40% Gel 15 Gram(s) Oral once PRN  glucagon  Injectable 1 milliGRAM(s) IntraMuscular once PRN  sodium chloride 0.9% lock flush 10 milliLiter(s) IV Push every 1 hour PRN      REVIEW OF SYSTEMS  --------------------------------------------------------------------------------  Gen: no fatigue  Respiratory: No dyspnea, cough  CV: No chest pain  GI: No abdominal pain, diarrhea, constipation  MSK: No joint pain/swelling; no edema    VITALS/PHYSICAL EXAM  --------------------------------------------------------------------------------  T(C): 36.4 (06-04-19 @ 10:57), Max: 37.4 (06-04-19 @ 04:42)  HR: 70 (06-04-19 @ 10:57) (60 - 70)  BP: 190/80 (06-04-19 @ 10:57) (159/68 - 190/80)  RR: 18 (06-04-19 @ 10:57) (18 - 20)  SpO2: 100% (06-04-19 @ 10:57) (97% - 100%)  Wt(kg): --  Height (cm): 177.8 (06-03-19 @ 07:19)  Weight (kg): 74.8 (06-03-19 @ 07:19)  BMI (kg/m2): 23.7 (06-03-19 @ 07:19)  BSA (m2): 1.92 (06-03-19 @ 07:19)      06-03-19 @ 07:01  -  06-04-19 @ 07:00  --------------------------------------------------------  IN: 180 mL / OUT: 0 mL / NET: 180 mL    06-04-19 @ 07:01  -  06-04-19 @ 14:00  --------------------------------------------------------  IN: 700 mL / OUT: 1200 mL / NET: -500 mL      Physical Exam:  	Gen: NAD, well-appearing  	Pulm: CTA B/L  	CV: RRR, S1S2; no rub  	Abd: +BS, soft, nontender/nondistended  	LE: Warm, FROM, no edema  	Neuro: follows commands  	Psych: Normal affect and mood  	Skin: Warm, without rashes              Access: +right non-tunneled HD catheter    LABS/STUDIES  --------------------------------------------------------------------------------              7.6    6.48  >-----------<  219      [06-04-19 @ 09:39]              24.2     134  |  98  |  85  ----------------------------<  103      [06-04-19 @ 05:32]  4.4   |  18  |  8.89        Ca     6.5     [06-04-19 @ 05:32]      Mg     2.3     [06-04-19 @ 05:32]      Phos  6.4     [06-04-19 @ 05:32]    TPro  x   /  Alb  x   /  TBili  x   /  DBili  x   /  AST  x   /  ALT  13  /  AlkPhos  x   [06-03-19 @ 12:51]    PT/INR: PT 12.9 , INR 1.12       [06-03-19 @ 07:53]  PTT: 26.6       [06-03-19 @ 07:53]      Creatinine Trend:  SCr 8.89 [06-04 @ 05:32]  SCr 8.13 [06-03 @ 07:53]        Iron 26, TIBC 205, %sat 13      [06-04-19 @ 09:27]  Ferritin 99      [06-04-19 @ 09:33]  HbA1c 6.0      [06-04-19 @ 09:39]    HBsAb >1000.0      [06-03-19 @ 15:57]  HBsAg Nonreact      [06-03-19 @ 15:57]  HBcAb Reactive      [06-03-19 @ 15:57]  HCV 0.13, Nonreact      [06-03-19 @ 15:57]

## 2019-06-04 NOTE — PROGRESS NOTE ADULT - PROBLEM SELECTOR PLAN 4
- no active symptoms.  - cardiology input appreciated.  - continue aspirin, Lipitor, Metoprolol, ARB.   - planned cardiac cath tomorrow

## 2019-06-04 NOTE — PROGRESS NOTE ADULT - PROBLEM SELECTOR PLAN 2
BPs elevated during HD.   - Will plan for another HD with [facility] tomorrow  - Increase losartan to 50mg/d.   - C/w anti-hypertensives.

## 2019-06-04 NOTE — PROGRESS NOTE ADULT - PROBLEM SELECTOR PLAN 5
Hyperphosphatemia and hypocalcemia from advanced CKD.   - Increase tums to 2 tabs TID w/ meals  - Monitor Ca and phos levels daily.   - Check iPTH

## 2019-06-04 NOTE — PROGRESS NOTE ADULT - PROBLEM SELECTOR PLAN 1
Patient on renal transplant list- has a kidney available, but prior to transplant needs cardiac clearance with [facility], s/p shiley catheter placement by IR  - s/p HD today, [facility] tomorrow then will have HD again post cath   - continue sodium bicarbonate tablets TID (home medication).   - calcium carbonate supplements TID (per renal reccs- for hypocalcemia)  - Case d/w Dr. Cardenas.

## 2019-06-04 NOTE — PROGRESS NOTE ADULT - SUBJECTIVE AND OBJECTIVE BOX
Surgery Progress Note    SUBJECTIVE: Pt seen and examined at bedside. Patient comfortable and in no-apparent distress. S/p right permacath placement. Patient pending heart Cath.     Vital Signs Last 24 Hrs  T(C): 37.4 (04 Jun 2019 14:23), Max: 37.4 (04 Jun 2019 04:42)  T(F): 99.3 (04 Jun 2019 14:23), Max: 99.4 (04 Jun 2019 04:42)  HR: 67 (04 Jun 2019 14:23) (60 - 71)  BP: 191/76 (04 Jun 2019 14:23) (158/66 - 223/90)  BP(mean): --  RR: 20 (04 Jun 2019 14:23) (18 - 20)  SpO2: 98% (04 Jun 2019 14:23) (97% - 100%)    Physical Exam:  General Appearance: Appears well, NAD  Respiratory: No labored breathing  CV: Pulse regularly present  Abdomen: Soft, nontense  MSK: Left arm with precautions. Left radial and ulnar pulse palpable, no weakness or sensory loss.       LABS:                        7.6    6.48  )-----------( 219      ( 04 Jun 2019 09:39 )             24.2     06-04    134<L>  |  98  |  85<H>  ----------------------------<  103<H>  4.4   |  18<L>  |  8.89<H>    Ca    6.5<LL>      04 Jun 2019 05:32  Phos  6.4     06-04  Mg     2.3     06-04    TPro  x   /  Alb  x   /  TBili  x   /  DBili  x   /  AST  x   /  ALT  13  /  AlkPhos  x   06-03    PT/INR - ( 03 Jun 2019 07:53 )   PT: 12.9 sec;   INR: 1.12 ratio         PTT - ( 03 Jun 2019 07:53 )  PTT:26.6 sec      INs and OUTs:    06-03-19 @ 07:01  -  06-04-19 @ 07:00  --------------------------------------------------------  IN: 180 mL / OUT: 0 mL / NET: 180 mL    06-04-19 @ 07:01  -  06-04-19 @ 14:44  --------------------------------------------------------  IN: 1180 mL / OUT: 1200 mL / NET: -20 mL

## 2019-06-04 NOTE — PROGRESS NOTE ADULT - PROBLEM SELECTOR PLAN 1
CKDV likely 2/2 DM and HTN, now ESRD. Started on HD today 6/4. Tolerated well.   - Have IR place tunneled catheter.   - HD planned for tomorrow as well.   - Needs Vascular Surgery for AVF creation.   - Monitor BMP  - Pt pending [facility], likely tomorrow AM.

## 2019-06-04 NOTE — PROGRESS NOTE ADULT - PROBLEM SELECTOR PLAN 5
- check A1c in AM.  - pt on levemir 8U qhs, novolog 7/8/7U pre-meals.   - diet: renal   - NPO past midnight:  will continue sliding scale insulin q6h and give reduced dose of lantus, 5U qhs for tonight.  - Can reinstate home dose insulin after HD line placement if no longer NPO.

## 2019-06-04 NOTE — PROGRESS NOTE ADULT - SUBJECTIVE AND OBJECTIVE BOX
24H hour events: No acute events overnight. No acute complaints.     MEDICATIONS:  aspirin enteric coated 81 milliGRAM(s) Oral daily  furosemide    Tablet 40 milliGRAM(s) Oral two times a day  hydrALAZINE 50 milliGRAM(s) Oral three times a day  losartan 25 milliGRAM(s) Oral daily  metoprolol succinate ER 50 milliGRAM(s) Oral daily          calcium carbonate    500 mG (Tums) Chewable 1 Tablet(s) Chew three times a day  pantoprazole    Tablet 40 milliGRAM(s) Oral before breakfast    atorvastatin 40 milliGRAM(s) Oral at bedtime  dextrose 40% Gel 15 Gram(s) Oral once PRN  dextrose 50% Injectable 12.5 Gram(s) IV Push once  dextrose 50% Injectable 25 Gram(s) IV Push once  dextrose 50% Injectable 25 Gram(s) IV Push once  glucagon  Injectable 1 milliGRAM(s) IntraMuscular once PRN  insulin glargine Injectable (LANTUS) 5 Unit(s) SubCutaneous at bedtime  insulin lispro (HumaLOG) corrective regimen sliding scale   SubCutaneous every 6 hours  levothyroxine 50 MICROGram(s) Oral daily    chlorhexidine 4% Liquid 1 Application(s) Topical <User Schedule>  dextrose 5%. 1000 milliLiter(s) IV Continuous <Continuous>  ferrous    sulfate 325 milliGRAM(s) Oral daily  sodium bicarbonate 650 milliGRAM(s) Oral three times a day  sodium chloride 0.9% lock flush 10 milliLiter(s) IV Push every 1 hour PRN        PHYSICAL EXAM:  T(C): 37.4 (06-04-19 @ 04:42), Max: 37.4 (06-04-19 @ 04:42)  HR: 65 (06-04-19 @ 04:42) (60 - 66)  BP: 180/72 (06-04-19 @ 05:55) (149/70 - 205/92)  RR: 18 (06-04-19 @ 04:42) (16 - 20)  SpO2: 98% (06-04-19 @ 04:42) (97% - 98%)  CVP(mm Hg): --  I&O's Summary    03 Jun 2019 07:01  -  04 Jun 2019 07:00  --------------------------------------------------------  IN: 180 mL / OUT: 0 mL / NET: 180 mL        Appearance: Normal	  HEENT:   Normal oral mucosa  Cardiovascular: normal rate, regular rhythm  Respiratory: Lungs clear to auscultation	  Psychiatry: Mood & affect appropriate  Gastrointestinal:  Soft  Skin: No rashes, No ecchymoses, No cyanosis	  Neurologic: Non-focal  Extremities: No clubbing, cyanosis or edema  Vascular: Peripheral pulses palpable         LABS:	 	    CBC Full  -  ( 03 Jun 2019 07:53 )  WBC Count : 7.4 K/uL  Hemoglobin : 8.7 g/dL  Hematocrit : 26.1 %  Platelet Count - Automated : 253 K/uL  Mean Cell Volume : 90.1 fl  Mean Cell Hemoglobin : 29.9 pg  Mean Cell Hemoglobin Concentration : 33.1 gm/dL  Auto Neutrophil # : 4.4 K/uL  Auto Lymphocyte # : 2.1 K/uL  Auto Monocyte # : 0.5 K/uL  Auto Eosinophil # : 0.3 K/uL  Auto Basophil # : 0.0 K/uL  Auto Neutrophil % : 59.5 %  Auto Lymphocyte % : 29.0 %  Auto Monocyte % : 7.0 %  Auto Eosinophil % : 3.9 %  Auto Basophil % : 0.7 %    06-04    134<L>  |  98  |  85<H>  ----------------------------<  103<H>  4.4   |  18<L>  |  8.89<H>  06-03    140  |  106  |  76<H>  ----------------------------<  76  4.8   |  17<L>  |  8.13<H>    Ca    6.5<LL>      04 Jun 2019 05:32  Ca    6.7<L>      03 Jun 2019 07:53  Phos  6.4     06-04  Mg     2.3     06-04    TPro  x   /  Alb  x   /  TBili  x   /  DBili  x   /  AST  x   /  ALT  13  /  AlkPhos  x   06-03  TPro  7.1  /  Alb  3.7  /  TBili  0.2  /  DBili  x   /  AST  15  /  ALT  13  /  AlkPhos  111  06-03      TELEMETRY: Sinus Rhythm in 60s  	      	  ASSESSMENT/PLAN: 62 yo male with ESRD not previously on HD (no fistula), CAD s/p stent 2009 and abnormal cath 9/2018, HTN, DMII, hypothyroidism, presents to the ED for cardiac cath for renal transplant clearance.	    1) Pre-Op Risk Stratification   - will plan for LHC following HD     Julian Erwin  Cardiology Fellow  Please feel free to contact me at [phone] (text or call) at any time.   NOTE: All Cardiology Service and Contact information can now be found at [email or web address], password: [email or web address] 24H hour events: No acute events overnight. No acute complaints.     MEDICATIONS:  aspirin enteric coated 81 milliGRAM(s) Oral daily  furosemide    Tablet 40 milliGRAM(s) Oral two times a day  hydrALAZINE 50 milliGRAM(s) Oral three times a day  losartan 25 milliGRAM(s) Oral daily  metoprolol succinate ER 50 milliGRAM(s) Oral daily          calcium carbonate    500 mG (Tums) Chewable 1 Tablet(s) Chew three times a day  pantoprazole    Tablet 40 milliGRAM(s) Oral before breakfast    atorvastatin 40 milliGRAM(s) Oral at bedtime  dextrose 40% Gel 15 Gram(s) Oral once PRN  dextrose 50% Injectable 12.5 Gram(s) IV Push once  dextrose 50% Injectable 25 Gram(s) IV Push once  dextrose 50% Injectable 25 Gram(s) IV Push once  glucagon  Injectable 1 milliGRAM(s) IntraMuscular once PRN  insulin glargine Injectable (LANTUS) 5 Unit(s) SubCutaneous at bedtime  insulin lispro (HumaLOG) corrective regimen sliding scale   SubCutaneous every 6 hours  levothyroxine 50 MICROGram(s) Oral daily    chlorhexidine 4% Liquid 1 Application(s) Topical <User Schedule>  dextrose 5%. 1000 milliLiter(s) IV Continuous <Continuous>  ferrous    sulfate 325 milliGRAM(s) Oral daily  sodium bicarbonate 650 milliGRAM(s) Oral three times a day  sodium chloride 0.9% lock flush 10 milliLiter(s) IV Push every 1 hour PRN    REVIEW OF SYSTEMS:    CONSTITUTIONAL: No weakness, fevers or chills  EYES/ENT: No visual changes;  No dysphagia  RESPIRATORY: No cough, wheezing, hemoptysis; No shortness of breath  CARDIOVASCULAR: No chest pain or palpitations; No lower extremity edema  GASTROINTESTINAL: No abdominal or epigastric pain. No nausea, vomiting, or hematemesis  GENITOURINARY: No dysuria, frequency or hematuria  NEUROLOGICAL: No numbness or weakness  SKIN: No itching, burning, rashes, or lesions   All other review of systems is negative unless indicated above.      PHYSICAL EXAM:  T(C): 37.4 (06-04-19 @ 04:42), Max: 37.4 (06-04-19 @ 04:42)  HR: 65 (06-04-19 @ 04:42) (60 - 66)  BP: 180/72 (06-04-19 @ 05:55) (149/70 - 205/92)  RR: 18 (06-04-19 @ 04:42) (16 - 20)  SpO2: 98% (06-04-19 @ 04:42) (97% - 98%)  CVP(mm Hg): --  I&O's Summary    03 Jun 2019 07:01  -  04 Jun 2019 07:00  --------------------------------------------------------  IN: 180 mL / OUT: 0 mL / NET: 180 mL        Appearance: Normal	  HEENT:   Normal oral mucosa  Cardiovascular: normal rate, regular rhythm  Respiratory: Lungs clear to auscultation	  Psychiatry: Mood & affect appropriate  Gastrointestinal:  Soft  Skin: No rashes, No ecchymoses, No cyanosis	  Neurologic: Non-focal  Extremities: No clubbing, cyanosis or edema  Vascular: Peripheral pulses palpable         LABS:	 	    CBC Full  -  ( 03 Jun 2019 07:53 )  WBC Count : 7.4 K/uL  Hemoglobin : 8.7 g/dL  Hematocrit : 26.1 %  Platelet Count - Automated : 253 K/uL  Mean Cell Volume : 90.1 fl  Mean Cell Hemoglobin : 29.9 pg  Mean Cell Hemoglobin Concentration : 33.1 gm/dL  Auto Neutrophil # : 4.4 K/uL  Auto Lymphocyte # : 2.1 K/uL  Auto Monocyte # : 0.5 K/uL  Auto Eosinophil # : 0.3 K/uL  Auto Basophil # : 0.0 K/uL  Auto Neutrophil % : 59.5 %  Auto Lymphocyte % : 29.0 %  Auto Monocyte % : 7.0 %  Auto Eosinophil % : 3.9 %  Auto Basophil % : 0.7 %    06-04    134<L>  |  98  |  85<H>  ----------------------------<  103<H>  4.4   |  18<L>  |  8.89<H>  06-03    140  |  106  |  76<H>  ----------------------------<  76  4.8   |  17<L>  |  8.13<H>    Ca    6.5<LL>      04 Jun 2019 05:32  Ca    6.7<L>      03 Jun 2019 07:53  Phos  6.4     06-04  Mg     2.3     06-04    TPro  x   /  Alb  x   /  TBili  x   /  DBili  x   /  AST  x   /  ALT  13  /  AlkPhos  x   06-03  TPro  7.1  /  Alb  3.7  /  TBili  0.2  /  DBili  x   /  AST  15  /  ALT  13  /  AlkPhos  111  06-03      TELEMETRY: Sinus Rhythm in 60s  	      	  ASSESSMENT/PLAN: 62 yo male with ESRD not previously on HD (no fistula), CAD s/p stent 2009 and abnormal cath 9/2018, HTN, DMII, hypothyroidism, presents to the ED for cardiac cath for renal transplant clearance.	    1) Pre-Op Risk Stratification   - will plan for LHC following HD     Julian Erwin  Cardiology Fellow  Please feel free to contact me at [phone] (text or call) at any time.   NOTE: All Cardiology Service and Contact information can now be found at [email or web address], password: [email or web address] 24H hour events: No acute events overnight. No acute complaints.     MEDICATIONS:  aspirin enteric coated 81 milliGRAM(s) Oral daily  furosemide    Tablet 40 milliGRAM(s) Oral two times a day  hydrALAZINE 50 milliGRAM(s) Oral three times a day  losartan 25 milliGRAM(s) Oral daily  metoprolol succinate ER 50 milliGRAM(s) Oral daily          calcium carbonate    500 mG (Tums) Chewable 1 Tablet(s) Chew three times a day  pantoprazole    Tablet 40 milliGRAM(s) Oral before breakfast    atorvastatin 40 milliGRAM(s) Oral at bedtime  dextrose 40% Gel 15 Gram(s) Oral once PRN  dextrose 50% Injectable 12.5 Gram(s) IV Push once  dextrose 50% Injectable 25 Gram(s) IV Push once  dextrose 50% Injectable 25 Gram(s) IV Push once  glucagon  Injectable 1 milliGRAM(s) IntraMuscular once PRN  insulin glargine Injectable (LANTUS) 5 Unit(s) SubCutaneous at bedtime  insulin lispro (HumaLOG) corrective regimen sliding scale   SubCutaneous every 6 hours  levothyroxine 50 MICROGram(s) Oral daily    chlorhexidine 4% Liquid 1 Application(s) Topical <User Schedule>  dextrose 5%. 1000 milliLiter(s) IV Continuous <Continuous>  ferrous    sulfate 325 milliGRAM(s) Oral daily  sodium bicarbonate 650 milliGRAM(s) Oral three times a day  sodium chloride 0.9% lock flush 10 milliLiter(s) IV Push every 1 hour PRN    REVIEW OF SYSTEMS:    CONSTITUTIONAL: No weakness, fevers or chills  EYES/ENT: No visual changes;  No dysphagia  RESPIRATORY: No cough, wheezing, hemoptysis; No shortness of breath  CARDIOVASCULAR: No chest pain or palpitations; No lower extremity edema  GASTROINTESTINAL: No abdominal or epigastric pain. No nausea, vomiting, or hematemesis  GENITOURINARY: No dysuria, frequency or hematuria  NEUROLOGICAL: No numbness or weakness  SKIN: No itching, burning, rashes, or lesions   All other review of systems is negative unless indicated above.      PHYSICAL EXAM:  T(C): 37.4 (06-04-19 @ 04:42), Max: 37.4 (06-04-19 @ 04:42)  HR: 65 (06-04-19 @ 04:42) (60 - 66)  BP: 180/72 (06-04-19 @ 05:55) (149/70 - 205/92)  RR: 18 (06-04-19 @ 04:42) (16 - 20)  SpO2: 98% (06-04-19 @ 04:42) (97% - 98%)  CVP(mm Hg): --  I&O's Summary    03 Jun 2019 07:01  -  04 Jun 2019 07:00  --------------------------------------------------------  IN: 180 mL / OUT: 0 mL / NET: 180 mL        Appearance: Normal	  HEENT:   Normal oral mucosa  Cardiovascular: normal rate, regular rhythm  Respiratory: Lungs clear to auscultation	  Psychiatry: Mood & affect appropriate  Gastrointestinal:  Soft  Skin: No rashes, No ecchymoses, No cyanosis	  Neurologic: Non-focal  Extremities: No clubbing, cyanosis or edema  Vascular: Peripheral pulses palpable         LABS:	 	    CBC Full  -  ( 03 Jun 2019 07:53 )  WBC Count : 7.4 K/uL  Hemoglobin : 8.7 g/dL  Hematocrit : 26.1 %  Platelet Count - Automated : 253 K/uL  Mean Cell Volume : 90.1 fl  Mean Cell Hemoglobin : 29.9 pg  Mean Cell Hemoglobin Concentration : 33.1 gm/dL  Auto Neutrophil # : 4.4 K/uL  Auto Lymphocyte # : 2.1 K/uL  Auto Monocyte # : 0.5 K/uL  Auto Eosinophil # : 0.3 K/uL  Auto Basophil # : 0.0 K/uL  Auto Neutrophil % : 59.5 %  Auto Lymphocyte % : 29.0 %  Auto Monocyte % : 7.0 %  Auto Eosinophil % : 3.9 %  Auto Basophil % : 0.7 %    06-04    134<L>  |  98  |  85<H>  ----------------------------<  103<H>  4.4   |  18<L>  |  8.89<H>  06-03    140  |  106  |  76<H>  ----------------------------<  76  4.8   |  17<L>  |  8.13<H>    Ca    6.5<LL>      04 Jun 2019 05:32  Ca    6.7<L>      03 Jun 2019 07:53  Phos  6.4     06-04  Mg     2.3     06-04    TPro  x   /  Alb  x   /  TBili  x   /  DBili  x   /  AST  x   /  ALT  13  /  AlkPhos  x   06-03  TPro  7.1  /  Alb  3.7  /  TBili  0.2  /  DBili  x   /  AST  15  /  ALT  13  /  AlkPhos  111  06-03      TELEMETRY: Sinus Rhythm in 60s  	      	  ASSESSMENT/PLAN: 64 yo male with ESRD not previously on HD (no fistula), CAD s/p stent 2009 and abnormal cath 9/2018, HTN, DMII, hypothyroidism, presents to the ED for cardiac cath for renal transplant clearance.	    1) Pre-Op Risk Stratification   - will plan for LHC tomorrow morning if HD can be initiated today      Julian Erwin  Cardiology Fellow  Please feel free to contact me at [phone] (text or call) at any time.   NOTE: All Cardiology Service and Contact information can now be found at [email or web address], password: [employer] 24H hour events: No acute events overnight. No acute complaints.     MEDICATIONS:  aspirin enteric coated 81 milliGRAM(s) Oral daily  furosemide    Tablet 40 milliGRAM(s) Oral two times a day  hydrALAZINE 50 milliGRAM(s) Oral three times a day  losartan 25 milliGRAM(s) Oral daily  metoprolol succinate ER 50 milliGRAM(s) Oral daily    calcium carbonate    500 mG (Tums) Chewable 1 Tablet(s) Chew three times a day  pantoprazole    Tablet 40 milliGRAM(s) Oral before breakfast    atorvastatin 40 milliGRAM(s) Oral at bedtime  dextrose 40% Gel 15 Gram(s) Oral once PRN  dextrose 50% Injectable 12.5 Gram(s) IV Push once  dextrose 50% Injectable 25 Gram(s) IV Push once  dextrose 50% Injectable 25 Gram(s) IV Push once  glucagon  Injectable 1 milliGRAM(s) IntraMuscular once PRN  insulin glargine Injectable (LANTUS) 5 Unit(s) SubCutaneous at bedtime  insulin lispro (HumaLOG) corrective regimen sliding scale   SubCutaneous every 6 hours  levothyroxine 50 MICROGram(s) Oral daily    chlorhexidine 4% Liquid 1 Application(s) Topical <User Schedule>  dextrose 5%. 1000 milliLiter(s) IV Continuous <Continuous>  ferrous    sulfate 325 milliGRAM(s) Oral daily  sodium bicarbonate 650 milliGRAM(s) Oral three times a day  sodium chloride 0.9% lock flush 10 milliLiter(s) IV Push every 1 hour PRN    REVIEW OF SYSTEMS:    CONSTITUTIONAL: No weakness, fevers or chills  EYES/ENT: No visual changes;  No dysphagia  RESPIRATORY: No cough, wheezing, hemoptysis; No shortness of breath  CARDIOVASCULAR: No chest pain or palpitations; No lower extremity edema  GASTROINTESTINAL: No abdominal or epigastric pain. No nausea, vomiting, or hematemesis  GENITOURINARY: No dysuria, frequency or hematuria  NEUROLOGICAL: No numbness or weakness  SKIN: No itching, burning, rashes, or lesions   All other review of systems is negative unless indicated above.      PHYSICAL EXAM:  T(C): 37.4 (06-04-19 @ 04:42), Max: 37.4 (06-04-19 @ 04:42)  HR: 65 (06-04-19 @ 04:42) (60 - 66)  BP: 180/72 (06-04-19 @ 05:55) (149/70 - 205/92)  RR: 18 (06-04-19 @ 04:42) (16 - 20)  SpO2: 98% (06-04-19 @ 04:42) (97% - 98%)  CVP(mm Hg): --  I&O's Summary    03 Jun 2019 07:01  -  04 Jun 2019 07:00  --------------------------------------------------------  IN: 180 mL / OUT: 0 mL / NET: 180 mL        Appearance: Normal	  HEENT:   Normal oral mucosa  Cardiovascular: normal rate, regular rhythm  Respiratory: Lungs clear to auscultation	  Psychiatry: Mood & affect appropriate  Gastrointestinal:  Soft  Skin: No rashes, No ecchymoses, No cyanosis	  Neurologic: Non-focal  Extremities: No clubbing, cyanosis or edema  Vascular: Peripheral pulses palpable         LABS:	 	    CBC Full  -  ( 03 Jun 2019 07:53 )  WBC Count : 7.4 K/uL  Hemoglobin : 8.7 g/dL  Hematocrit : 26.1 %  Platelet Count - Automated : 253 K/uL  Mean Cell Volume : 90.1 fl  Mean Cell Hemoglobin : 29.9 pg  Mean Cell Hemoglobin Concentration : 33.1 gm/dL  Auto Neutrophil # : 4.4 K/uL  Auto Lymphocyte # : 2.1 K/uL  Auto Monocyte # : 0.5 K/uL  Auto Eosinophil # : 0.3 K/uL  Auto Basophil # : 0.0 K/uL  Auto Neutrophil % : 59.5 %  Auto Lymphocyte % : 29.0 %  Auto Monocyte % : 7.0 %  Auto Eosinophil % : 3.9 %  Auto Basophil % : 0.7 %    06-04    134<L>  |  98  |  85<H>  ----------------------------<  103<H>  4.4   |  18<L>  |  8.89<H>  06-03    140  |  106  |  76<H>  ----------------------------<  76  4.8   |  17<L>  |  8.13<H>    Ca    6.5<LL>      04 Jun 2019 05:32  Ca    6.7<L>      03 Jun 2019 07:53  Phos  6.4     06-04  Mg     2.3     06-04    TPro  x   /  Alb  x   /  TBili  x   /  DBili  x   /  AST  x   /  ALT  13  /  AlkPhos  x   06-03  TPro  7.1  /  Alb  3.7  /  TBili  0.2  /  DBili  x   /  AST  15  /  ALT  13  /  AlkPhos  111  06-03      TELEMETRY: Sinus Rhythm in 60s  	      	  ASSESSMENT/PLAN: 62 yo male with ESRD not previously on HD (no fistula), CAD s/p stent 2009 and abnormal cath 9/2018, HTN, DMII, hypothyroidism, presents to the ED for cardiac cath for renal transplant clearance.	    1) Pre-Op Risk Stratification   - will plan for LHC tomorrow morning if HD can be initiated today      Julian Erwin  Cardiology Fellow  Please feel free to contact me at [phone] (text or call) at any time.   NOTE: All Cardiology Service and Contact information can now be found at [email or web address], password: [email or web address]

## 2019-06-04 NOTE — PROGRESS NOTE ADULT - PROBLEM SELECTOR PLAN 2
improving, HD will also help to lower the bp.   Continue other medications: - hydralazine PO 50 mg TID, losartan 25 mg QD, lasix 40 mg BID (all home medications).  - if remains uncontrolled can increased hydralazine to 75 mg TID

## 2019-06-04 NOTE — PROGRESS NOTE ADULT - SUBJECTIVE AND OBJECTIVE BOX
Patient is a 63y old  Male who presents with a chief complaint of needs cardiac cath and new HD (04 Jun 2019 07:44)      SUBJECTIVE / OVERNIGHT EVENTS: Patient seen and examined at bedside. He denies any CP, SOB, abd pain and n/v. He is s/p HD this morning.     ROS:  All other review of systems negative    Allergies    No Known Allergies    Intolerances        MEDICATIONS  (STANDING):  aspirin enteric coated 81 milliGRAM(s) Oral daily  atorvastatin 40 milliGRAM(s) Oral at bedtime  calcium carbonate    500 mG (Tums) Chewable 1 Tablet(s) Chew three times a day  chlorhexidine 4% Liquid 1 Application(s) Topical <User Schedule>  dextrose 5%. 1000 milliLiter(s) (50 mL/Hr) IV Continuous <Continuous>  dextrose 50% Injectable 12.5 Gram(s) IV Push once  dextrose 50% Injectable 25 Gram(s) IV Push once  dextrose 50% Injectable 25 Gram(s) IV Push once  ferrous    sulfate 325 milliGRAM(s) Oral daily  furosemide    Tablet 40 milliGRAM(s) Oral two times a day  hydrALAZINE 50 milliGRAM(s) Oral three times a day  insulin glargine Injectable (LANTUS) 5 Unit(s) SubCutaneous at bedtime  insulin lispro (HumaLOG) corrective regimen sliding scale   SubCutaneous every 6 hours  levothyroxine 50 MICROGram(s) Oral daily  losartan 25 milliGRAM(s) Oral daily  metoprolol succinate ER 50 milliGRAM(s) Oral daily  pantoprazole    Tablet 40 milliGRAM(s) Oral before breakfast  sodium bicarbonate 650 milliGRAM(s) Oral three times a day    MEDICATIONS  (PRN):  dextrose 40% Gel 15 Gram(s) Oral once PRN Blood Glucose LESS THAN 70 milliGRAM(s)/deciliter  glucagon  Injectable 1 milliGRAM(s) IntraMuscular once PRN Glucose LESS THAN 70 milligrams/deciliter  sodium chloride 0.9% lock flush 10 milliLiter(s) IV Push every 1 hour PRN Pre/post blood products, medications, blood draw, and to maintain line patency      Vital Signs Last 24 Hrs  T(C): 36.4 (04 Jun 2019 10:57), Max: 37.4 (04 Jun 2019 04:42)  T(F): 97.5 (04 Jun 2019 10:57), Max: 99.4 (04 Jun 2019 04:42)  HR: 70 (04 Jun 2019 10:57) (60 - 70)  BP: 190/80 (04 Jun 2019 10:57) (159/68 - 190/80)  BP(mean): --  RR: 18 (04 Jun 2019 10:57) (18 - 20)  SpO2: 100% (04 Jun 2019 10:57) (97% - 100%)  CAPILLARY BLOOD GLUCOSE      POCT Blood Glucose.: 148 mg/dL (04 Jun 2019 12:51)  POCT Blood Glucose.: 103 mg/dL (04 Jun 2019 06:01)  POCT Blood Glucose.: 183 mg/dL (04 Jun 2019 01:02)  POCT Blood Glucose.: 195 mg/dL (04 Jun 2019 00:14)  POCT Blood Glucose.: 228 mg/dL (03 Jun 2019 22:33)  POCT Blood Glucose.: 121 mg/dL (03 Jun 2019 16:50)    I&O's Summary    03 Jun 2019 07:01  -  04 Jun 2019 07:00  --------------------------------------------------------  IN: 180 mL / OUT: 0 mL / NET: 180 mL    04 Jun 2019 07:01  -  04 Jun 2019 13:59  --------------------------------------------------------  IN: 700 mL / OUT: 1200 mL / NET: -500 mL        PHYSICAL EXAM:  GENERAL: NAD, well-developed  HEAD:  Atraumatic, Normocephalic  EYES: EOMI, PERRLA, conjunctiva and sclera clear  NECK: Supple, No JVD, +shiley catheter (right IJ)  CHEST/LUNG: Clear to auscultation bilaterally; No wheeze  HEART: Regular rate and rhythm; No murmurs, rubs, or gallops  ABDOMEN: Soft, Nontender, Nondistended; Bowel sounds present  EXTREMITIES:  2+ Peripheral Pulses, No clubbing, cyanosis, or edema  NEUROLOGY: AAOx3, non-focal    LABS:                        7.6    6.48  )-----------( 219      ( 04 Jun 2019 09:39 )             24.2     06-04    134<L>  |  98  |  85<H>  ----------------------------<  103<H>  4.4   |  18<L>  |  8.89<H>    Ca    6.5<LL>      04 Jun 2019 05:32  Phos  6.4     06-04  Mg     2.3     06-04    TPro  x   /  Alb  x   /  TBili  x   /  DBili  x   /  AST  x   /  ALT  13  /  AlkPhos  x   06-03    PT/INR - ( 03 Jun 2019 07:53 )   PT: 12.9 sec;   INR: 1.12 ratio         PTT - ( 03 Jun 2019 07:53 )  PTT:26.6 sec          RADIOLOGY & ADDITIONAL TESTS:    Consultant(s) Notes Reviewed:  cardiology rec noted     Care Discussed with Consultants/Other Providers: Dr. Cardenas and medicine NP     Case Discussed with Family: at bedside and son over the phone, requesting to hold off on AVF for now

## 2019-06-05 LAB
CALCIUM SERPL-MCNC: 6.9 MG/DL — LOW (ref 8.4–10.5)
GLUCOSE BLDC GLUCOMTR-MCNC: 104 MG/DL — HIGH (ref 70–99)
GLUCOSE BLDC GLUCOMTR-MCNC: 113 MG/DL — HIGH (ref 70–99)
GLUCOSE BLDC GLUCOMTR-MCNC: 124 MG/DL — HIGH (ref 70–99)
GLUCOSE BLDC GLUCOMTR-MCNC: 65 MG/DL — LOW (ref 70–99)
GLUCOSE BLDC GLUCOMTR-MCNC: 72 MG/DL — SIGNIFICANT CHANGE UP (ref 70–99)
GLUCOSE BLDC GLUCOMTR-MCNC: 82 MG/DL — SIGNIFICANT CHANGE UP (ref 70–99)
GLUCOSE BLDC GLUCOMTR-MCNC: 88 MG/DL — SIGNIFICANT CHANGE UP (ref 70–99)
PTH-INTACT FLD-MCNC: 562 PG/ML — HIGH (ref 15–65)

## 2019-06-05 PROCEDURE — 93571 IV DOP VEL&/PRESS C FLO 1ST: CPT | Mod: 26,LD,GC

## 2019-06-05 PROCEDURE — 93010 ELECTROCARDIOGRAM REPORT: CPT | Mod: 59

## 2019-06-05 PROCEDURE — 93454 CORONARY ARTERY ANGIO S&I: CPT | Mod: 26,59,GC

## 2019-06-05 PROCEDURE — 92928 PRQ TCAT PLMT NTRAC ST 1 LES: CPT | Mod: LD,GC

## 2019-06-05 PROCEDURE — 99232 SBSQ HOSP IP/OBS MODERATE 35: CPT

## 2019-06-05 PROCEDURE — 99233 SBSQ HOSP IP/OBS HIGH 50: CPT

## 2019-06-05 PROCEDURE — 99152 MOD SED SAME PHYS/QHP 5/>YRS: CPT | Mod: GC

## 2019-06-05 PROCEDURE — 99233 SBSQ HOSP IP/OBS HIGH 50: CPT | Mod: GC

## 2019-06-05 RX ORDER — HYDRALAZINE HCL 50 MG
75 TABLET ORAL THREE TIMES A DAY
Refills: 0 | Status: DISCONTINUED | OUTPATIENT
Start: 2019-06-05 | End: 2019-06-08

## 2019-06-05 RX ORDER — CLOPIDOGREL BISULFATE 75 MG/1
75 TABLET, FILM COATED ORAL DAILY
Refills: 0 | Status: DISCONTINUED | OUTPATIENT
Start: 2019-06-06 | End: 2019-06-06

## 2019-06-05 RX ORDER — HYDRALAZINE HCL 50 MG
25 TABLET ORAL ONCE
Refills: 0 | Status: COMPLETED | OUTPATIENT
Start: 2019-06-05 | End: 2019-06-05

## 2019-06-05 RX ORDER — CARVEDILOL PHOSPHATE 80 MG/1
6.25 CAPSULE, EXTENDED RELEASE ORAL EVERY 12 HOURS
Refills: 0 | Status: DISCONTINUED | OUTPATIENT
Start: 2019-06-05 | End: 2019-06-11

## 2019-06-05 RX ORDER — HYDRALAZINE HCL 50 MG
5 TABLET ORAL ONCE
Refills: 0 | Status: COMPLETED | OUTPATIENT
Start: 2019-06-05 | End: 2019-06-05

## 2019-06-05 RX ORDER — LOSARTAN POTASSIUM 100 MG/1
50 TABLET, FILM COATED ORAL DAILY
Refills: 0 | Status: DISCONTINUED | OUTPATIENT
Start: 2019-06-05 | End: 2019-06-07

## 2019-06-05 RX ADMIN — LOSARTAN POTASSIUM 25 MILLIGRAM(S): 100 TABLET, FILM COATED ORAL at 05:19

## 2019-06-05 RX ADMIN — Medication 25 MILLIGRAM(S): at 17:09

## 2019-06-05 RX ADMIN — Medication 50 MILLIGRAM(S): at 13:59

## 2019-06-05 RX ADMIN — CARVEDILOL PHOSPHATE 6.25 MILLIGRAM(S): 80 CAPSULE, EXTENDED RELEASE ORAL at 17:09

## 2019-06-05 RX ADMIN — ATORVASTATIN CALCIUM 40 MILLIGRAM(S): 80 TABLET, FILM COATED ORAL at 22:16

## 2019-06-05 RX ADMIN — Medication 50 MILLIGRAM(S): at 05:16

## 2019-06-05 RX ADMIN — Medication 50 MILLIGRAM(S): at 05:15

## 2019-06-05 RX ADMIN — PANTOPRAZOLE SODIUM 40 MILLIGRAM(S): 20 TABLET, DELAYED RELEASE ORAL at 05:15

## 2019-06-05 RX ADMIN — Medication 40 MILLIGRAM(S): at 17:10

## 2019-06-05 RX ADMIN — Medication 50 MICROGRAM(S): at 05:16

## 2019-06-05 RX ADMIN — LOSARTAN POTASSIUM 50 MILLIGRAM(S): 100 TABLET, FILM COATED ORAL at 13:59

## 2019-06-05 RX ADMIN — Medication 2 TABLET(S): at 05:16

## 2019-06-05 RX ADMIN — IRON SUCROSE 110 MILLIGRAM(S): 20 INJECTION, SOLUTION INTRAVENOUS at 22:15

## 2019-06-05 RX ADMIN — Medication 40 MILLIGRAM(S): at 05:16

## 2019-06-05 RX ADMIN — Medication 2 TABLET(S): at 14:01

## 2019-06-05 RX ADMIN — Medication 2 TABLET(S): at 22:16

## 2019-06-05 RX ADMIN — Medication 5 MILLIGRAM(S): at 10:46

## 2019-06-05 RX ADMIN — Medication 325 MILLIGRAM(S): at 13:59

## 2019-06-05 RX ADMIN — Medication 75 MILLIGRAM(S): at 22:17

## 2019-06-05 RX ADMIN — Medication 81 MILLIGRAM(S): at 08:22

## 2019-06-05 NOTE — PROGRESS NOTE ADULT - PROBLEM SELECTOR PLAN 4
- no active symptoms.  - cardiology input appreciated.  - continue aspirin, Lipitor, Metoprolol, ARB.   - planned cardiac cath tomorrow s/p LHC with 1 stent to LAD  - c/w plavix   - continue aspirin, Lipitor, ARB.   - metoprolol switched to coreg per renal rec

## 2019-06-05 NOTE — PROGRESS NOTE ADULT - SUBJECTIVE AND OBJECTIVE BOX
No events overnight. Denies CP, SOB, palp. Pending cath today.    MEDICATIONS:  aspirin enteric coated 81 milliGRAM(s) Oral daily  furosemide    Tablet 40 milliGRAM(s) Oral two times a day  hydrALAZINE 50 milliGRAM(s) Oral three times a day  losartan 25 milliGRAM(s) Oral daily  metoprolol succinate ER 50 milliGRAM(s) Oral daily          calcium carbonate    500 mG (Tums) Chewable 2 Tablet(s) Chew three times a day  pantoprazole    Tablet 40 milliGRAM(s) Oral before breakfast    atorvastatin 40 milliGRAM(s) Oral at bedtime  dextrose 40% Gel 15 Gram(s) Oral once PRN  dextrose 50% Injectable 12.5 Gram(s) IV Push once  dextrose 50% Injectable 25 Gram(s) IV Push once  dextrose 50% Injectable 25 Gram(s) IV Push once  glucagon  Injectable 1 milliGRAM(s) IntraMuscular once PRN  insulin glargine Injectable (LANTUS) 5 Unit(s) SubCutaneous at bedtime  insulin lispro (HumaLOG) corrective regimen sliding scale   SubCutaneous every 6 hours  levothyroxine 50 MICROGram(s) Oral daily    chlorhexidine 4% Liquid 1 Application(s) Topical <User Schedule>  dextrose 5%. 1000 milliLiter(s) IV Continuous <Continuous>  ferrous    sulfate 325 milliGRAM(s) Oral daily  ferrous    sulfate 325 milliGRAM(s) Oral daily  iron sucrose IVPB 200 milliGRAM(s) IV Intermittent every 24 hours  sodium chloride 0.9% lock flush 10 milliLiter(s) IV Push every 1 hour PRN      PHYSICAL EXAM:  T(C): 37.4 (06-05-19 @ 04:41), Max: 37.4 (06-04-19 @ 14:23)  HR: 61 (06-05-19 @ 04:41) (61 - 71)  BP: 178/74 (06-05-19 @ 04:41) (158/66 - 223/90)  RR: 20 (06-05-19 @ 04:41) (18 - 20)  SpO2: 98% (06-05-19 @ 04:41) (98% - 100%)  Wt(kg): --  I&O's Summary    03 Jun 2019 07:01  -  04 Jun 2019 07:00  --------------------------------------------------------  IN: 180 mL / OUT: 0 mL / NET: 180 mL    04 Jun 2019 07:01  -  05 Jun 2019 06:05  --------------------------------------------------------  IN: 1480 mL / OUT: 1200 mL / NET: 280 mL        Appearance: Normal	  HEENT:   Normal oral mucosa  Cardiovascular: Normal S1 S2, No JVD, No murmurs, No edema  Respiratory: Lungs clear to auscultation	  Psychiatry: A & O x 3, Mood & affect appropriate  Gastrointestinal:  Soft, Non-tender, + BS	  Skin: No rashes, No ecchymoses, No cyanosis	  Neurologic: Non-focal  Extremities: Normal range of motion, No clubbing, cyanosis        LABS:	 	    CBC Full  -  ( 04 Jun 2019 09:39 )  WBC Count : 6.48 K/uL  Hemoglobin : 7.6 g/dL  Hematocrit : 24.2 %  Platelet Count - Automated : 219 K/uL  Mean Cell Volume : 90.0 fl  Mean Cell Hemoglobin : 28.3 pg  Mean Cell Hemoglobin Concentration : 31.4 gm/dL  Auto Neutrophil # : 4.33 K/uL  Auto Lymphocyte # : 1.32 K/uL  Auto Monocyte # : 0.53 K/uL  Auto Eosinophil # : 0.23 K/uL  Auto Basophil # : 0.04 K/uL  Auto Neutrophil % : 66.8 %  Auto Lymphocyte % : 20.4 %  Auto Monocyte % : 8.2 %  Auto Eosinophil % : 3.5 %  Auto Basophil % : 0.6 %    06-04    134<L>  |  98  |  85<H>  ----------------------------<  103<H>  4.4   |  18<L>  |  8.89<H>  06-03    140  |  106  |  76<H>  ----------------------------<  76  4.8   |  17<L>  |  8.13<H>    Ca    6.5<LL>      04 Jun 2019 05:32  Ca    6.7<L>      03 Jun 2019 07:53  Phos  6.4     06-04  Mg     2.3     06-04    TPro  x   /  Alb  x   /  TBili  x   /  DBili  x   /  AST  x   /  ALT  13  /  AlkPhos  x   06-03  TPro  7.1  /  Alb  3.7  /  TBili  0.2  /  DBili  x   /  AST  15  /  ALT  13  /  AlkPhos  111  06-03    	  ASSESSMENT/PLAN: 	  64 yo male with ESRD not previously on HD (no fistula), CAD s/p stent 2009 and abnormal cath 9/2018, HTN, DMII, hypothyroidism, presents to the ED for cardiac cath for renal transplant clearance.	    1) Pre-Op Risk Stratification   - s/p HD session 6/4 - tolerated well  - plan for LHC today      Laurie Colon MD  Cardiology Fellow  [phone]    All Cardiology service information can be found on [email or web address], password: [email or web address]. No events overnight. Denies CP, SOB, palp. Pending cath today.    MEDICATIONS:  aspirin enteric coated 81 milliGRAM(s) Oral daily  furosemide    Tablet 40 milliGRAM(s) Oral two times a day  hydrALAZINE 50 milliGRAM(s) Oral three times a day  losartan 25 milliGRAM(s) Oral daily  metoprolol succinate ER 50 milliGRAM(s) Oral daily    calcium carbonate    500 mG (Tums) Chewable 2 Tablet(s) Chew three times a day  pantoprazole    Tablet 40 milliGRAM(s) Oral before breakfast    atorvastatin 40 milliGRAM(s) Oral at bedtime  dextrose 40% Gel 15 Gram(s) Oral once PRN  dextrose 50% Injectable 12.5 Gram(s) IV Push once  dextrose 50% Injectable 25 Gram(s) IV Push once  dextrose 50% Injectable 25 Gram(s) IV Push once  glucagon  Injectable 1 milliGRAM(s) IntraMuscular once PRN  insulin glargine Injectable (LANTUS) 5 Unit(s) SubCutaneous at bedtime  insulin lispro (HumaLOG) corrective regimen sliding scale   SubCutaneous every 6 hours  levothyroxine 50 MICROGram(s) Oral daily    chlorhexidine 4% Liquid 1 Application(s) Topical <User Schedule>  dextrose 5%. 1000 milliLiter(s) IV Continuous <Continuous>  ferrous    sulfate 325 milliGRAM(s) Oral daily  ferrous    sulfate 325 milliGRAM(s) Oral daily  iron sucrose IVPB 200 milliGRAM(s) IV Intermittent every 24 hours  sodium chloride 0.9% lock flush 10 milliLiter(s) IV Push every 1 hour PRN    ROS:  CONSTITUTIONAL: No weakness, fevers or chills  EYES/ENT: No visual changes;  No dysphagia  RESPIRATORY: No cough, wheezing, hemoptysis; No shortness of breath  CARDIOVASCULAR: No chest pain or palpitations; No lower extremity edema  GASTROINTESTINAL: No abdominal or epigastric pain. No nausea, vomiting, or hematemesis  GENITOURINARY: No dysuria, frequency or hematuria  NEUROLOGICAL: No numbness or weakness  SKIN: No itching, burning, rashes, or lesions   All other review of systems is negative unless indicated above.      PHYSICAL EXAM:  T(C): 37.4 (06-05-19 @ 04:41), Max: 37.4 (06-04-19 @ 14:23)  HR: 61 (06-05-19 @ 04:41) (61 - 71)  BP: 178/74 (06-05-19 @ 04:41) (158/66 - 223/90)  RR: 20 (06-05-19 @ 04:41) (18 - 20)  SpO2: 98% (06-05-19 @ 04:41) (98% - 100%)  Wt(kg): --  I&O's Summary    03 Jun 2019 07:01  -  04 Jun 2019 07:00  --------------------------------------------------------  IN: 180 mL / OUT: 0 mL / NET: 180 mL    04 Jun 2019 07:01  -  05 Jun 2019 06:05  --------------------------------------------------------  IN: 1480 mL / OUT: 1200 mL / NET: 280 mL        Appearance: Normal	  HEENT:   Normal oral mucosa  Cardiovascular: Normal S1 S2, No JVD, No murmurs, No edema  Respiratory: Lungs clear to auscultation	  Psychiatry: A & O x 3, Mood & affect appropriate  Gastrointestinal:  Soft, Non-tender, + BS	  Skin: No rashes, No ecchymoses, No cyanosis	  Neurologic: Non-focal  Extremities: Normal range of motion, No clubbing, cyanosis        LABS:	 	    CBC Full  -  ( 04 Jun 2019 09:39 )  WBC Count : 6.48 K/uL  Hemoglobin : 7.6 g/dL  Hematocrit : 24.2 %  Platelet Count - Automated : 219 K/uL  Mean Cell Volume : 90.0 fl  Mean Cell Hemoglobin : 28.3 pg  Mean Cell Hemoglobin Concentration : 31.4 gm/dL  Auto Neutrophil # : 4.33 K/uL  Auto Lymphocyte # : 1.32 K/uL  Auto Monocyte # : 0.53 K/uL  Auto Eosinophil # : 0.23 K/uL  Auto Basophil # : 0.04 K/uL  Auto Neutrophil % : 66.8 %  Auto Lymphocyte % : 20.4 %  Auto Monocyte % : 8.2 %  Auto Eosinophil % : 3.5 %  Auto Basophil % : 0.6 %    06-04    134<L>  |  98  |  85<H>  ----------------------------<  103<H>  4.4   |  18<L>  |  8.89<H>  06-03    140  |  106  |  76<H>  ----------------------------<  76  4.8   |  17<L>  |  8.13<H>    Ca    6.5<LL>      04 Jun 2019 05:32  Ca    6.7<L>      03 Jun 2019 07:53  Phos  6.4     06-04  Mg     2.3     06-04    TPro  x   /  Alb  x   /  TBili  x   /  DBili  x   /  AST  x   /  ALT  13  /  AlkPhos  x   06-03  TPro  7.1  /  Alb  3.7  /  TBili  0.2  /  DBili  x   /  AST  15  /  ALT  13  /  AlkPhos  111  06-03    	  ASSESSMENT/PLAN: 	  64 yo male with ESRD not previously on HD (no fistula), CAD s/p stent 2009 and abnormal cath 9/2018, HTN, DMII, hypothyroidism, presents to the ED for cardiac cath for renal transplant evaluation.	    1) Pre-Op Risk Stratification   - s/p HD session 6/4 - tolerated well  - plan for C today      Laurie Colon MD  Cardiology Fellow  [phone]    All Cardiology service information can be found on [email or web address], password: [email or web address].

## 2019-06-05 NOTE — PROGRESS NOTE ADULT - SUBJECTIVE AND OBJECTIVE BOX
Patient is a 63y old  Male who presents with a chief complaint of needs cardiac cath and new HD (05 Jun 2019 15:39)      SUBJECTIVE / OVERNIGHT EVENTS:    ROS:  All other review of systems negative    Allergies    No Known Allergies    Intolerances        MEDICATIONS  (STANDING):  aspirin enteric coated 81 milliGRAM(s) Oral daily  atorvastatin 40 milliGRAM(s) Oral at bedtime  calcium carbonate    500 mG (Tums) Chewable 2 Tablet(s) Chew three times a day  carvedilol 6.25 milliGRAM(s) Oral every 12 hours  chlorhexidine 4% Liquid 1 Application(s) Topical <User Schedule>  dextrose 5%. 1000 milliLiter(s) (50 mL/Hr) IV Continuous <Continuous>  dextrose 50% Injectable 12.5 Gram(s) IV Push once  dextrose 50% Injectable 25 Gram(s) IV Push once  dextrose 50% Injectable 25 Gram(s) IV Push once  ferrous    sulfate 325 milliGRAM(s) Oral daily  furosemide    Tablet 40 milliGRAM(s) Oral two times a day  hydrALAZINE 50 milliGRAM(s) Oral three times a day  insulin glargine Injectable (LANTUS) 5 Unit(s) SubCutaneous at bedtime  insulin lispro (HumaLOG) corrective regimen sliding scale   SubCutaneous every 6 hours  iron sucrose IVPB 200 milliGRAM(s) IV Intermittent every 24 hours  levothyroxine 50 MICROGram(s) Oral daily  losartan 50 milliGRAM(s) Oral daily  pantoprazole    Tablet 40 milliGRAM(s) Oral before breakfast    MEDICATIONS  (PRN):  dextrose 40% Gel 15 Gram(s) Oral once PRN Blood Glucose LESS THAN 70 milliGRAM(s)/deciliter  glucagon  Injectable 1 milliGRAM(s) IntraMuscular once PRN Glucose LESS THAN 70 milligrams/deciliter  sodium chloride 0.9% lock flush 10 milliLiter(s) IV Push every 1 hour PRN Pre/post blood products, medications, blood draw, and to maintain line patency      Vital Signs Last 24 Hrs  T(C): 37.4 (05 Jun 2019 04:41), Max: 37.4 (05 Jun 2019 04:41)  T(F): 99.3 (05 Jun 2019 04:41), Max: 99.3 (05 Jun 2019 04:41)  HR: 61 (05 Jun 2019 04:41) (61 - 67)  BP: 178/74 (05 Jun 2019 04:41) (178/74 - 183/69)  BP(mean): --  RR: 20 (05 Jun 2019 04:41) (20 - 20)  SpO2: 98% (05 Jun 2019 04:41) (98% - 98%)  CAPILLARY BLOOD GLUCOSE      POCT Blood Glucose.: 88 mg/dL (05 Jun 2019 14:22)  POCT Blood Glucose.: 104 mg/dL (05 Jun 2019 08:31)  POCT Blood Glucose.: 82 mg/dL (05 Jun 2019 07:46)  POCT Blood Glucose.: 72 mg/dL (05 Jun 2019 07:28)  POCT Blood Glucose.: 65 mg/dL (05 Jun 2019 07:26)  POCT Blood Glucose.: 113 mg/dL (05 Jun 2019 00:16)  POCT Blood Glucose.: 197 mg/dL (04 Jun 2019 21:16)  POCT Blood Glucose.: 156 mg/dL (04 Jun 2019 17:24)    I&O's Summary    04 Jun 2019 07:01  -  05 Jun 2019 07:00  --------------------------------------------------------  IN: 1480 mL / OUT: 1200 mL / NET: 280 mL    05 Jun 2019 07:01  -  05 Jun 2019 15:47  --------------------------------------------------------  IN: 120 mL / OUT: 0 mL / NET: 120 mL        PHYSICAL EXAM:  GENERAL: NAD, well-developed  HEAD:  Atraumatic, Normocephalic  EYES: EOMI, PERRLA, conjunctiva and sclera clear  NECK: Supple, No JVD  CHEST/LUNG: Clear to auscultation bilaterally; No wheeze  HEART: Regular rate and rhythm; No murmurs, rubs, or gallops  ABDOMEN: Soft, Nontender, Nondistended; Bowel sounds present  EXTREMITIES:  2+ Peripheral Pulses, No clubbing, cyanosis, or edema  NEUROLOGY: AAOx3, non-focal  PSYCH: calm  SKIN: No rashes or lesions    LABS:                        7.6    6.48  )-----------( 219      ( 04 Jun 2019 09:39 )             24.2     06-04    134<L>  |  98  |  85<H>  ----------------------------<  103<H>  4.4   |  18<L>  |  8.89<H>    Ca    6.5<LL>      04 Jun 2019 05:32  Phos  6.4     06-04  Mg     2.3     06-04                RADIOLOGY & ADDITIONAL TESTS:    Imaging Personally Reviewed:    Consultant(s) Notes Reviewed:      Care Discussed with Consultants/Other Providers:    Case Discussed with Family:    Goals of Care: Patient is a 63y old  Male who presents with a chief complaint of needs cardiac cath and new HD (05 Jun 2019 15:39)      SUBJECTIVE / OVERNIGHT EVENTS: Pt seen and examined at bedside. HE is s/p LHC s/p stent placement today. He denies any CP, SOB, abd pain and n/v.     ROS:  All other review of systems negative    Allergies    No Known Allergies    Intolerances        MEDICATIONS  (STANDING):  aspirin enteric coated 81 milliGRAM(s) Oral daily  atorvastatin 40 milliGRAM(s) Oral at bedtime  calcium carbonate    500 mG (Tums) Chewable 2 Tablet(s) Chew three times a day  carvedilol 6.25 milliGRAM(s) Oral every 12 hours  chlorhexidine 4% Liquid 1 Application(s) Topical <User Schedule>  dextrose 5%. 1000 milliLiter(s) (50 mL/Hr) IV Continuous <Continuous>  dextrose 50% Injectable 12.5 Gram(s) IV Push once  dextrose 50% Injectable 25 Gram(s) IV Push once  dextrose 50% Injectable 25 Gram(s) IV Push once  ferrous    sulfate 325 milliGRAM(s) Oral daily  furosemide    Tablet 40 milliGRAM(s) Oral two times a day  hydrALAZINE 50 milliGRAM(s) Oral three times a day  insulin glargine Injectable (LANTUS) 5 Unit(s) SubCutaneous at bedtime  insulin lispro (HumaLOG) corrective regimen sliding scale   SubCutaneous every 6 hours  iron sucrose IVPB 200 milliGRAM(s) IV Intermittent every 24 hours  levothyroxine 50 MICROGram(s) Oral daily  losartan 50 milliGRAM(s) Oral daily  pantoprazole    Tablet 40 milliGRAM(s) Oral before breakfast    MEDICATIONS  (PRN):  dextrose 40% Gel 15 Gram(s) Oral once PRN Blood Glucose LESS THAN 70 milliGRAM(s)/deciliter  glucagon  Injectable 1 milliGRAM(s) IntraMuscular once PRN Glucose LESS THAN 70 milligrams/deciliter  sodium chloride 0.9% lock flush 10 milliLiter(s) IV Push every 1 hour PRN Pre/post blood products, medications, blood draw, and to maintain line patency      Vital Signs Last 24 Hrs  T(C): 37.4 (05 Jun 2019 04:41), Max: 37.4 (05 Jun 2019 04:41)  T(F): 99.3 (05 Jun 2019 04:41), Max: 99.3 (05 Jun 2019 04:41)  HR: 61 (05 Jun 2019 04:41) (61 - 67)  BP: 178/74 (05 Jun 2019 04:41) (178/74 - 183/69)  BP(mean): --  RR: 20 (05 Jun 2019 04:41) (20 - 20)  SpO2: 98% (05 Jun 2019 04:41) (98% - 98%)  CAPILLARY BLOOD GLUCOSE      POCT Blood Glucose.: 88 mg/dL (05 Jun 2019 14:22)  POCT Blood Glucose.: 104 mg/dL (05 Jun 2019 08:31)  POCT Blood Glucose.: 82 mg/dL (05 Jun 2019 07:46)  POCT Blood Glucose.: 72 mg/dL (05 Jun 2019 07:28)  POCT Blood Glucose.: 65 mg/dL (05 Jun 2019 07:26)  POCT Blood Glucose.: 113 mg/dL (05 Jun 2019 00:16)  POCT Blood Glucose.: 197 mg/dL (04 Jun 2019 21:16)  POCT Blood Glucose.: 156 mg/dL (04 Jun 2019 17:24)    I&O's Summary    04 Jun 2019 07:01  -  05 Jun 2019 07:00  --------------------------------------------------------  IN: 1480 mL / OUT: 1200 mL / NET: 280 mL    05 Jun 2019 07:01  -  05 Jun 2019 15:47  --------------------------------------------------------  IN: 120 mL / OUT: 0 mL / NET: 120 mL        PHYSICAL EXAM:  GENERAL: NAD, well-developed  HEAD:  Atraumatic, Normocephalic  EYES: EOMI, PERRLA, conjunctiva and sclera clear  NECK: Supple, No JVD  CHEST/LUNG: Clear to auscultation bilaterally; No wheeze  HEART: Regular rate and rhythm; No murmurs, rubs, or gallops  ABDOMEN: Soft, Nontender, Nondistended; Bowel sounds present  EXTREMITIES:  2+ Peripheral Pulses, No clubbing, cyanosis, or edema  NEUROLOGY: AAOx3, non-focal      LABS:                        7.6    6.48  )-----------( 219      ( 04 Jun 2019 09:39 )             24.2     06-04    134<L>  |  98  |  85<H>  ----------------------------<  103<H>  4.4   |  18<L>  |  8.89<H>    Ca    6.5<LL>      04 Jun 2019 05:32  Phos  6.4     06-04  Mg     2.3     06-04                RADIOLOGY & ADDITIONAL TESTS:    Consultant(s) Notes Reviewed:  vascular nephrology and cardiology rec noted     Care Discussed with Consultants/Other Providers: Dr. Cardenas and Medicine NP    Case Discussed with Family: at bedside

## 2019-06-05 NOTE — PROGRESS NOTE ADULT - PROBLEM SELECTOR PLAN 1
CKDV likely 2/2 DM and HTN, now ESRD. Started on HD today 6/4. Tolerated well.   - Have IR place tunneled catheter tomorrow  - HD planned again today post cath - done  -AVF not needed as got only 1 stent placed in LAD and hence might not require more than 3 months to wait till transplant- hence no need

## 2019-06-05 NOTE — CHART NOTE - NSCHARTNOTEFT_GEN_A_CORE
s/p PCI   #5 Ethiopian sheath removed from right femoral artery manual pressure held good hemostasis   no hematoma noted patient tolerated well   -170 HR 50s

## 2019-06-05 NOTE — PROGRESS NOTE ADULT - PROBLEM SELECTOR PLAN 5
- check A1c in AM.  - pt on levemir 8U qhs, novolog 7/8/7U pre-meals.   - diet: renal   - NPO past midnight:  will continue sliding scale insulin q6h and give reduced dose of lantus, 5U qhs for tonight.  - Can reinstate home dose insulin after HD line placement if no longer NPO. - pt on levemir 8U qhs, novolog 7/8/7U pre-meals.   - diet: renal   - reinstate home 8 units as pt is no longer NPO.

## 2019-06-05 NOTE — PROGRESS NOTE ADULT - SUBJECTIVE AND OBJECTIVE BOX
[facility] DIVISION OF KIDNEY DISEASES AND HYPERTENSION -- FOLLOW UP NOTE  --------------------------------------------------------------------------------  Chief Complaint:  pt s/p cath today    24 hour events/subjective:  no cp      PAST HISTORY  --------------------------------------------------------------------------------  No significant changes to PMH, PSH, FHx, SHx, unless otherwise noted    ALLERGIES & MEDICATIONS  --------------------------------------------------------------------------------  Allergies    No Known Allergies    Intolerances      Standing Inpatient Medications  aspirin enteric coated 81 milliGRAM(s) Oral daily  atorvastatin 40 milliGRAM(s) Oral at bedtime  calcium carbonate    500 mG (Tums) Chewable 2 Tablet(s) Chew three times a day  carvedilol 6.25 milliGRAM(s) Oral every 12 hours  chlorhexidine 4% Liquid 1 Application(s) Topical <User Schedule>  dextrose 5%. 1000 milliLiter(s) IV Continuous <Continuous>  dextrose 50% Injectable 12.5 Gram(s) IV Push once  dextrose 50% Injectable 25 Gram(s) IV Push once  dextrose 50% Injectable 25 Gram(s) IV Push once  ferrous    sulfate 325 milliGRAM(s) Oral daily  furosemide    Tablet 40 milliGRAM(s) Oral two times a day  hydrALAZINE 50 milliGRAM(s) Oral three times a day  insulin glargine Injectable (LANTUS) 5 Unit(s) SubCutaneous at bedtime  insulin lispro (HumaLOG) corrective regimen sliding scale   SubCutaneous every 6 hours  iron sucrose IVPB 200 milliGRAM(s) IV Intermittent every 24 hours  levothyroxine 50 MICROGram(s) Oral daily  losartan 50 milliGRAM(s) Oral daily  pantoprazole    Tablet 40 milliGRAM(s) Oral before breakfast    PRN Inpatient Medications  dextrose 40% Gel 15 Gram(s) Oral once PRN  glucagon  Injectable 1 milliGRAM(s) IntraMuscular once PRN  sodium chloride 0.9% lock flush 10 milliLiter(s) IV Push every 1 hour PRN      Gen: No weight changes, fatigue, fevers/chills, weakness  Head/Eyes/Ears/Mouth: No headache; Normal hearing; Normal vision    Respiratory: No dyspnea, cough, wheezing, hemoptysis  CV: No chest pain, PND, orthopnea  GI: No abdominal pain, diarrhea, constipation, nausea, vomiting, melena, hematochezia  : No increased frequency, dysuria, hematuria, nocturia  MSK: No joint pain/swelling; no back pain; no edema   Heme: No easy bruising or bleeding  All other systems were reviewed and are negative, except as noted.      VITALS/PHYSICAL EXAM  --------------------------------------------------------------------------------  T(C): 37.4 (06-05-19 @ 04:41), Max: 37.4 (06-05-19 @ 04:41)  HR: 61 (06-05-19 @ 04:41) (61 - 67)  BP: 178/74 (06-05-19 @ 04:41) (178/74 - 183/69)  RR: 20 (06-05-19 @ 04:41) (20 - 20)  SpO2: 98% (06-05-19 @ 04:41) (98% - 98%)  Wt(kg): --        06-04-19 @ 07:01  -  06-05-19 @ 07:00  --------------------------------------------------------  IN: 1480 mL / OUT: 1200 mL / NET: 280 mL    06-05-19 @ 07:01  -  06-05-19 @ 15:39  --------------------------------------------------------  IN: 120 mL / OUT: 0 mL / NET: 120 mL      PHYSICAL EXAM: vital signs as above  in no apparent distress  Neck: Supple, no JVD,    Lungs: no rhonchi, no wheeze, no crackles  CVS: S1 S2 no M/R/G  Abdomen: no tenderness, no organomegaly, BS present  Neuro: Grossly intact  Skin: warm, dry  Ext: no cyanosis or clubbing, no edema  Access: R IJ non tunn catheter    LABS/STUDIES  --------------------------------------------------------------------------------              7.6    6.48  >-----------<  219      [06-04-19 @ 09:39]              24.2     134  |  98  |  85  ----------------------------<  103      [06-04-19 @ 05:32]  4.4   |  18  |  8.89        Ca     6.5     [06-04-19 @ 05:32]      Mg     2.3     [06-04-19 @ 05:32]      Phos  6.4     [06-04-19 @ 05:32]            Creatinine Trend:  SCr 8.89 [06-04 @ 05:32]  SCr 8.13 [06-03 @ 07:53]        Iron 26, TIBC 205, %sat 13      [06-04-19 @ 09:27]  Ferritin 99      [06-04-19 @ 09:33]  PTH -- (Ca 6.9)      [06-05-19 @ 08:14]   562  HbA1c 6.0      [06-04-19 @ 09:39]    HBsAb >1000.0      [06-03-19 @ 15:57]  HBsAg Nonreact      [06-03-19 @ 15:57]  HBcAb Reactive      [06-03-19 @ 15:57]  HCV 0.13, Nonreact      [06-03-19 @ 15:57]

## 2019-06-05 NOTE — PROGRESS NOTE ADULT - PROBLEM SELECTOR PLAN 5
Hyperphosphatemia and hypocalcemia from advanced CKD.   - Increased tums to 2 tabs TID w/ meals  - Monitor Ca and phos levels daily.   - Check iPTH

## 2019-06-05 NOTE — CHART NOTE - NSCHARTNOTEFT_GEN_A_CORE
Patient had stent to LAD. Patient will not undergo transplant for a few months per nephrologist Dr. Cardenas.  Patient ideally should not have fistula placement in setting of recent stent.   Patient should follow up as an outpatient for fistula planning.   You may call [phone] to schedule an appointment.    Signing off, please page with question.  [facility]  [phone]

## 2019-06-05 NOTE — PROGRESS NOTE ADULT - PROBLEM SELECTOR PLAN 2
BPs elevated   Ok to increase ARB  change beta blocker to Coreg to decrease dialysis clearance  will increase UF on dialysis and again dailysis tomorrow  ok to use crestor for cholesterol

## 2019-06-05 NOTE — PROGRESS NOTE ADULT - PROBLEM SELECTOR PLAN 1
Patient on renal transplant list- has a kidney available, but prior to transplant needs cardiac clearance with [facility], s/p shiley catheter placement by IR  - s/p HD today, [facility] tomorrow then will have HD again post cath   - continue sodium bicarbonate tablets TID (home medication).   - calcium carbonate supplements TID (per renal reccs- for hypocalcemia)  - Case d/w Dr. Cardenas. - s/p shiley catheter placement by IR  - s/p HD today and C today s/p stent to LAD, no need for AVF per nephro as pt maybe able to proceed with renal transplant in .o  - IR consulted for tunneled catheter placement tomorrow  - continue sodium bicarbonate tablets TID (home medication).   - calcium carbonate supplements TID (per renal recs- for hypocalcemia)  - pt will need outpt HD initiated d/w CM, family to complete medicare application   - Case d/w Dr. Cardenas.

## 2019-06-05 NOTE — PROGRESS NOTE ADULT - PROBLEM SELECTOR PLAN 2
improving, HD will also help to lower the bp.   Continue other medications: - hydralazine PO 50 mg TID, losartan 25 mg QD, lasix 40 mg BID (all home medications).  - if remains uncontrolled can increased hydralazine to 75 mg TID Continue medications: - hydralazine PO 50 mg TID, lasix 40 mg BID (all home medications).  -hydralazine increased to 75 mg TID  - losartan increased to 50 mg daily

## 2019-06-05 NOTE — PROGRESS NOTE ADULT - SUBJECTIVE AND OBJECTIVE BOX
[facility] VASCULAR SURGERY DAILY PROGRESS NOTE    SUBJECTIVE:  Pt seen and examined at bedside. Patient comfortable and in no-apparent distress. S/p right permacath placement. Patient pending stress test.    OBJECTIVE:    Vital Signs Last 24 Hrs  T(C): 37.4 (05 Jun 2019 04:41), Max: 37.4 (04 Jun 2019 14:23)  T(F): 99.3 (05 Jun 2019 04:41), Max: 99.3 (04 Jun 2019 14:23)  HR: 61 (05 Jun 2019 04:41) (61 - 71)  BP: 178/74 (05 Jun 2019 04:41) (158/66 - 223/90)  BP(mean): --  RR: 20 (05 Jun 2019 04:41) (18 - 20)  SpO2: 98% (05 Jun 2019 04:41) (98% - 100%)    EXAM:  General Appearance: Appears well, NAD  Respiratory: No labored breathing  CV: Pulse regularly present  Abdomen: Soft, nontense  MSK: Left arm with precautions. Left radial and ulnar pulse palpable, no weakness or sensory loss.

## 2019-06-06 ENCOUNTER — TRANSCRIPTION ENCOUNTER (OUTPATIENT)
Age: 64
End: 2019-06-06

## 2019-06-06 LAB
ANION GAP SERPL CALC-SCNC: 16 MMOL/L — SIGNIFICANT CHANGE UP (ref 5–17)
BASOPHILS # BLD AUTO: 0.04 K/UL — SIGNIFICANT CHANGE UP (ref 0–0.2)
BASOPHILS NFR BLD AUTO: 0.6 % — SIGNIFICANT CHANGE UP (ref 0–2)
BUN SERPL-MCNC: 41 MG/DL — HIGH (ref 7–23)
CALCIUM SERPL-MCNC: 7 MG/DL — LOW (ref 8.4–10.5)
CHLORIDE SERPL-SCNC: 97 MMOL/L — SIGNIFICANT CHANGE UP (ref 96–108)
CO2 SERPL-SCNC: 26 MMOL/L — SIGNIFICANT CHANGE UP (ref 22–31)
CREAT SERPL-MCNC: 5.45 MG/DL — HIGH (ref 0.5–1.3)
EOSINOPHIL # BLD AUTO: 0.24 K/UL — SIGNIFICANT CHANGE UP (ref 0–0.5)
EOSINOPHIL NFR BLD AUTO: 3.9 % — SIGNIFICANT CHANGE UP (ref 0–6)
GLUCOSE BLDC GLUCOMTR-MCNC: 103 MG/DL — HIGH (ref 70–99)
GLUCOSE BLDC GLUCOMTR-MCNC: 112 MG/DL — HIGH (ref 70–99)
GLUCOSE BLDC GLUCOMTR-MCNC: 226 MG/DL — HIGH (ref 70–99)
GLUCOSE BLDC GLUCOMTR-MCNC: 278 MG/DL — HIGH (ref 70–99)
GLUCOSE BLDC GLUCOMTR-MCNC: 78 MG/DL — SIGNIFICANT CHANGE UP (ref 70–99)
GLUCOSE SERPL-MCNC: 64 MG/DL — LOW (ref 70–99)
HBA1C BLD-MCNC: 5.8 % — HIGH (ref 4–5.6)
HCT VFR BLD CALC: 23.8 % — LOW (ref 39–50)
HGB BLD-MCNC: 7.5 G/DL — LOW (ref 13–17)
IMM GRANULOCYTES NFR BLD AUTO: 0.3 % — SIGNIFICANT CHANGE UP (ref 0–1.5)
INR BLD: 1.38 RATIO — HIGH (ref 0.88–1.16)
LYMPHOCYTES # BLD AUTO: 1.58 K/UL — SIGNIFICANT CHANGE UP (ref 1–3.3)
LYMPHOCYTES # BLD AUTO: 25.6 % — SIGNIFICANT CHANGE UP (ref 13–44)
MCHC RBC-ENTMCNC: 28.1 PG — SIGNIFICANT CHANGE UP (ref 27–34)
MCHC RBC-ENTMCNC: 31.5 GM/DL — LOW (ref 32–36)
MCV RBC AUTO: 89.1 FL — SIGNIFICANT CHANGE UP (ref 80–100)
MONOCYTES # BLD AUTO: 0.68 K/UL — SIGNIFICANT CHANGE UP (ref 0–0.9)
MONOCYTES NFR BLD AUTO: 11 % — SIGNIFICANT CHANGE UP (ref 2–14)
NEUTROPHILS # BLD AUTO: 3.6 K/UL — SIGNIFICANT CHANGE UP (ref 1.8–7.4)
NEUTROPHILS NFR BLD AUTO: 58.6 % — SIGNIFICANT CHANGE UP (ref 43–77)
PHOSPHATE SERPL-MCNC: 4 MG/DL — SIGNIFICANT CHANGE UP (ref 2.5–4.5)
PLATELET # BLD AUTO: 203 K/UL — SIGNIFICANT CHANGE UP (ref 150–400)
POTASSIUM SERPL-MCNC: 3.5 MMOL/L — SIGNIFICANT CHANGE UP (ref 3.5–5.3)
POTASSIUM SERPL-SCNC: 3.5 MMOL/L — SIGNIFICANT CHANGE UP (ref 3.5–5.3)
PROTHROM AB SERPL-ACNC: 15.7 SEC — HIGH (ref 10–13.1)
RBC # BLD: 2.67 M/UL — LOW (ref 4.2–5.8)
RBC # FLD: 13.2 % — SIGNIFICANT CHANGE UP (ref 10.3–14.5)
SODIUM SERPL-SCNC: 139 MMOL/L — SIGNIFICANT CHANGE UP (ref 135–145)
WBC # BLD: 6.16 K/UL — SIGNIFICANT CHANGE UP (ref 3.8–10.5)
WBC # FLD AUTO: 6.16 K/UL — SIGNIFICANT CHANGE UP (ref 3.8–10.5)

## 2019-06-06 PROCEDURE — 93010 ELECTROCARDIOGRAM REPORT: CPT

## 2019-06-06 PROCEDURE — 99233 SBSQ HOSP IP/OBS HIGH 50: CPT

## 2019-06-06 PROCEDURE — 99233 SBSQ HOSP IP/OBS HIGH 50: CPT | Mod: GC

## 2019-06-06 PROCEDURE — 90935 HEMODIALYSIS ONE EVALUATION: CPT | Mod: GC

## 2019-06-06 RX ORDER — INSULIN LISPRO 100/ML
VIAL (ML) SUBCUTANEOUS
Refills: 0 | Status: DISCONTINUED | OUTPATIENT
Start: 2019-06-06 | End: 2019-06-07

## 2019-06-06 RX ORDER — SIMETHICONE 80 MG/1
80 TABLET, CHEWABLE ORAL ONCE
Refills: 0 | Status: COMPLETED | OUTPATIENT
Start: 2019-06-06 | End: 2019-06-06

## 2019-06-06 RX ORDER — INSULIN LISPRO 100/ML
VIAL (ML) SUBCUTANEOUS AT BEDTIME
Refills: 0 | Status: DISCONTINUED | OUTPATIENT
Start: 2019-06-06 | End: 2019-06-07

## 2019-06-06 RX ORDER — TICAGRELOR 90 MG/1
90 TABLET ORAL
Refills: 0 | Status: DISCONTINUED | OUTPATIENT
Start: 2019-06-07 | End: 2019-06-11

## 2019-06-06 RX ORDER — TICAGRELOR 90 MG/1
180 TABLET ORAL ONCE
Refills: 0 | Status: COMPLETED | OUTPATIENT
Start: 2019-06-06 | End: 2019-06-06

## 2019-06-06 RX ORDER — ACETAMINOPHEN 500 MG
650 TABLET ORAL ONCE
Refills: 0 | Status: COMPLETED | OUTPATIENT
Start: 2019-06-06 | End: 2019-06-06

## 2019-06-06 RX ORDER — TICAGRELOR 90 MG/1
1 TABLET ORAL
Qty: 60 | Refills: 0
Start: 2019-06-06 | End: 2019-07-05

## 2019-06-06 RX ADMIN — Medication 650 MILLIGRAM(S): at 01:18

## 2019-06-06 RX ADMIN — Medication 75 MILLIGRAM(S): at 05:41

## 2019-06-06 RX ADMIN — Medication 50 MICROGRAM(S): at 05:41

## 2019-06-06 RX ADMIN — Medication 650 MILLIGRAM(S): at 01:52

## 2019-06-06 RX ADMIN — CARVEDILOL PHOSPHATE 6.25 MILLIGRAM(S): 80 CAPSULE, EXTENDED RELEASE ORAL at 05:40

## 2019-06-06 RX ADMIN — CLOPIDOGREL BISULFATE 75 MILLIGRAM(S): 75 TABLET, FILM COATED ORAL at 14:14

## 2019-06-06 RX ADMIN — Medication 2 TABLET(S): at 22:08

## 2019-06-06 RX ADMIN — Medication 75 MILLIGRAM(S): at 14:14

## 2019-06-06 RX ADMIN — IRON SUCROSE 110 MILLIGRAM(S): 20 INJECTION, SOLUTION INTRAVENOUS at 18:49

## 2019-06-06 RX ADMIN — PANTOPRAZOLE SODIUM 40 MILLIGRAM(S): 20 TABLET, DELAYED RELEASE ORAL at 05:41

## 2019-06-06 RX ADMIN — SIMETHICONE 80 MILLIGRAM(S): 80 TABLET, CHEWABLE ORAL at 20:09

## 2019-06-06 RX ADMIN — Medication 81 MILLIGRAM(S): at 14:14

## 2019-06-06 RX ADMIN — Medication 40 MILLIGRAM(S): at 17:14

## 2019-06-06 RX ADMIN — LOSARTAN POTASSIUM 50 MILLIGRAM(S): 100 TABLET, FILM COATED ORAL at 05:41

## 2019-06-06 RX ADMIN — ATORVASTATIN CALCIUM 40 MILLIGRAM(S): 80 TABLET, FILM COATED ORAL at 22:08

## 2019-06-06 RX ADMIN — Medication 2 TABLET(S): at 05:40

## 2019-06-06 RX ADMIN — Medication 3: at 18:01

## 2019-06-06 RX ADMIN — Medication 10 MILLIGRAM(S): at 22:08

## 2019-06-06 RX ADMIN — CARVEDILOL PHOSPHATE 6.25 MILLIGRAM(S): 80 CAPSULE, EXTENDED RELEASE ORAL at 17:14

## 2019-06-06 RX ADMIN — Medication 2 TABLET(S): at 14:14

## 2019-06-06 RX ADMIN — Medication 75 MILLIGRAM(S): at 22:08

## 2019-06-06 RX ADMIN — TICAGRELOR 180 MILLIGRAM(S): 90 TABLET ORAL at 18:01

## 2019-06-06 RX ADMIN — Medication 40 MILLIGRAM(S): at 05:40

## 2019-06-06 RX ADMIN — Medication 4: at 00:45

## 2019-06-06 NOTE — PROGRESS NOTE ADULT - PROBLEM SELECTOR PLAN 1
- s/p shiley catheter placement by IR  - s/p HD today and C today s/p stent to LAD, no need for AVF per nephro as pt maybe able to proceed with renal transplant in .o  - IR consulted for tunneled catheter placement likely tomorrow  - continue sodium bicarbonate tablets TID (home medication).   - calcium carbonate supplements TID (per renal recs- for hypocalcemia)  - pt will need outpt HD initiated d/w CM, family to complete medicare application   - Case d/w Dr. Cardenas.

## 2019-06-06 NOTE — DISCHARGE NOTE PROVIDER - CARE PROVIDER_API CALL
Roderick Lovelace (MD)  Cardiovascular Disease; Internal Medicine; Nuclear Cardiology  [address] [facility], [address]  [place] [address]  Phone: [phone]  Fax: [phone]  Follow Up Time:     Tesfaye Cardenas)  Nephrology  [address] [facility], [address]nd [place] [address]  Phone: [phone]  Fax: [phone]  Follow Up Time:     Jamie Corral)  Internal Medicine  [address] [address] [place] [address]  Phone: [phone]  Fax: [phone]  Follow Up Time:

## 2019-06-06 NOTE — PROGRESS NOTE ADULT - SUBJECTIVE AND OBJECTIVE BOX
No events overnight. Denies CP, SOB, palp, edema.    MEDICATIONS:  aspirin enteric coated 81 milliGRAM(s) Oral daily  carvedilol 6.25 milliGRAM(s) Oral every 12 hours  clopidogrel Tablet 75 milliGRAM(s) Oral daily  furosemide    Tablet 40 milliGRAM(s) Oral two times a day  hydrALAZINE 75 milliGRAM(s) Oral three times a day  losartan 50 milliGRAM(s) Oral daily  calcium carbonate    500 mG (Tums) Chewable 2 Tablet(s) Chew three times a day  pantoprazole    Tablet 40 milliGRAM(s) Oral before breakfast  atorvastatin 40 milliGRAM(s) Oral at bedtime  dextrose 40% Gel 15 Gram(s) Oral once PRN  dextrose 50% Injectable 12.5 Gram(s) IV Push once  dextrose 50% Injectable 25 Gram(s) IV Push once  dextrose 50% Injectable 25 Gram(s) IV Push once  glucagon  Injectable 1 milliGRAM(s) IntraMuscular once PRN  insulin lispro (HumaLOG) corrective regimen sliding scale   SubCutaneous every 6 hours  levothyroxine 50 MICROGram(s) Oral daily  chlorhexidine 4% Liquid 1 Application(s) Topical <User Schedule>  dextrose 5%. 1000 milliLiter(s) IV Continuous <Continuous>  iron sucrose IVPB 200 milliGRAM(s) IV Intermittent every 24 hours  sodium chloride 0.9% lock flush 10 milliLiter(s) IV Push every 1 hour PRN      REVIEW OF SYSTEMS:    CONSTITUTIONAL: No weakness, fevers or chills  EYES/ENT: No visual changes;  No dysphagia  RESPIRATORY: No cough, wheezing, hemoptysis; No shortness of breath  CARDIOVASCULAR: No chest pain or palpitations; No lower extremity edema  GASTROINTESTINAL: No abdominal or epigastric pain. No nausea, vomiting, or hematemesis  GENITOURINARY: No dysuria, frequency or hematuria  NEUROLOGICAL: No numbness or weakness  SKIN: No itching, burning, rashes, or lesions   HEME: No bleeding or bruising  MSK: No joint pains or muscle pains  All other review of systems is negative unless indicated above.    PHYSICAL EXAM:  T(C): 36.9 (06-06-19 @ 04:47), Max: 36.9 (06-06-19 @ 04:47)  HR: 62 (06-06-19 @ 04:47) (61 - 72)  BP: 165/74 (06-06-19 @ 04:47) (142/60 - 213/82)  RR: 18 (06-06-19 @ 04:47) (16 - 18)  SpO2: 98% (06-06-19 @ 04:47) (97% - 100%)  Wt(kg): --  I&O's Summary    04 Jun 2019 07:01  -  05 Jun 2019 07:00  --------------------------------------------------------  IN: 1480 mL / OUT: 1200 mL / NET: 280 mL    05 Jun 2019 07:01  -  06 Jun 2019 06:28  --------------------------------------------------------  IN: 1120 mL / OUT: 1800 mL / NET: -680 mL        Appearance: Normal	  HEENT:   Normal oral mucosa  Cardiovascular: Normal S1 S2, No JVD, No murmurs, No edema  Respiratory: Lungs clear to auscultation	  Psychiatry: A & O x 3, Mood & affect appropriate  Gastrointestinal:  Soft, Non-tender, + BS	  Skin: No rashes, No ecchymoses, No cyanosis	  Neurologic: Non-focal  Extremities: Normal range of motion, No clubbing, cyanosis        LABS:	 	    CBC Full  -  ( 04 Jun 2019 09:39 )  WBC Count : 6.48 K/uL  Hemoglobin : 7.6 g/dL  Hematocrit : 24.2 %  Platelet Count - Automated : 219 K/uL  Mean Cell Volume : 90.0 fl  Mean Cell Hemoglobin : 28.3 pg  Mean Cell Hemoglobin Concentration : 31.4 gm/dL  Auto Neutrophil # : 4.33 K/uL  Auto Lymphocyte # : 1.32 K/uL  Auto Monocyte # : 0.53 K/uL  Auto Eosinophil # : 0.23 K/uL  Auto Basophil # : 0.04 K/uL  Auto Neutrophil % : 66.8 %  Auto Lymphocyte % : 20.4 %  Auto Monocyte % : 8.2 %  Auto Eosinophil % : 3.5 %  Auto Basophil % : 0.6 %    PREVIOUS DIAGNOSTIC TESTING:    Echo 6/5/19  CORONARY VESSELS: The coronary circulationis left dominant.  LM:   --  LM: Angiography showed minor luminal irregularities with no flow  limiting lesions.  LAD:   --  Proximal LAD: There was a 80 % stenosis.  CX:   --  Circumflex: Angiography showed moderate atherosclerosis.  --  OM1: There was a 0 % stenosis at the site of a prior stent.  RCA:   --  Proximal RCA: The vessel was very small sized. Angiography  showed severe atherosclerosis.  COMPLICATIONS: There were no complications.  SUMMARY:  CORONARY VESSELS: LM: Angiography showed minor luminal irregularities with  no flow limiting lesions. Proximal LAD: There was a 80 % stenosis.  Circumflex: Angiography showed moderate atherosclerosis. OM1: There was a  0 % stenosis at the site of a prior stent. Proximal RCA: The vessel was  very small sized. Angiography showed severe atherosclerosis.  CARDIAC STRUCTURES: Global left ventricular function was normal. EF  estimated was 60 %.  DIAGNOSTIC RECOMMENDATIONS: OM IFR normal.  LAD IFR 0.52  INTERVENTIONAL RECOMMENDATIONS: Aspirin andplavix. Transplant delayed.  	  ASSESSMENT/PLAN: 	  64 yo male with ESRD not previously on HD (no fistula), CAD s/p stent 2009 and abnormal cath 9/2018, HTN, DMII, hypothyroidism, presents to the ED for cardiac cath for renal transplant evaluation.	    CAD  - s/p PCI to LAD on 6/5/19  - c/w asa, plavix, statin, BB, ARB    HTN  - BP elevated  - increase coreg to 12.5mg BID  - c/w losartan, hydralazine    ESRD on HD  - c/w dialysis  - would not hold plavix for AVF placement      Laurie Colon MD  Cardiology Fellow  [phone]    All Cardiology service information can be found on [email or web address], password: [employer]. No events overnight. Denies CP, SOB, palp, edema.    MEDICATIONS:  aspirin enteric coated 81 milliGRAM(s) Oral daily  carvedilol 6.25 milliGRAM(s) Oral every 12 hours  clopidogrel Tablet 75 milliGRAM(s) Oral daily  furosemide    Tablet 40 milliGRAM(s) Oral two times a day  hydrALAZINE 75 milliGRAM(s) Oral three times a day  losartan 50 milliGRAM(s) Oral daily  calcium carbonate    500 mG (Tums) Chewable 2 Tablet(s) Chew three times a day  pantoprazole    Tablet 40 milliGRAM(s) Oral before breakfast  atorvastatin 40 milliGRAM(s) Oral at bedtime  dextrose 40% Gel 15 Gram(s) Oral once PRN  dextrose 50% Injectable 12.5 Gram(s) IV Push once  dextrose 50% Injectable 25 Gram(s) IV Push once  dextrose 50% Injectable 25 Gram(s) IV Push once  glucagon  Injectable 1 milliGRAM(s) IntraMuscular once PRN  insulin lispro (HumaLOG) corrective regimen sliding scale   SubCutaneous every 6 hours  levothyroxine 50 MICROGram(s) Oral daily  chlorhexidine 4% Liquid 1 Application(s) Topical <User Schedule>  dextrose 5%. 1000 milliLiter(s) IV Continuous <Continuous>  iron sucrose IVPB 200 milliGRAM(s) IV Intermittent every 24 hours  sodium chloride 0.9% lock flush 10 milliLiter(s) IV Push every 1 hour PRN      REVIEW OF SYSTEMS:    CONSTITUTIONAL: No weakness, fevers or chills  EYES/ENT: No visual changes;  No dysphagia  RESPIRATORY: No cough, wheezing, hemoptysis; No shortness of breath  CARDIOVASCULAR: No chest pain or palpitations; No lower extremity edema  GASTROINTESTINAL: No abdominal or epigastric pain. No nausea, vomiting, or hematemesis  GENITOURINARY: No dysuria, frequency or hematuria  NEUROLOGICAL: No numbness or weakness  SKIN: No itching, burning, rashes, or lesions   HEME: No bleeding or bruising  MSK: No joint pains or muscle pains  All other review of systems is negative unless indicated above.    PHYSICAL EXAM:  T(C): 36.9 (06-06-19 @ 04:47), Max: 36.9 (06-06-19 @ 04:47)  HR: 62 (06-06-19 @ 04:47) (61 - 72)  BP: 165/74 (06-06-19 @ 04:47) (142/60 - 213/82)  RR: 18 (06-06-19 @ 04:47) (16 - 18)  SpO2: 98% (06-06-19 @ 04:47) (97% - 100%)  Wt(kg): --  I&O's Summary    04 Jun 2019 07:01  -  05 Jun 2019 07:00  --------------------------------------------------------  IN: 1480 mL / OUT: 1200 mL / NET: 280 mL    05 Jun 2019 07:01  -  06 Jun 2019 06:28  --------------------------------------------------------  IN: 1120 mL / OUT: 1800 mL / NET: -680 mL        Appearance: Normal	  HEENT:   Normal oral mucosa  Cardiovascular: Normal S1 S2, No JVD, No murmurs, No edema  Respiratory: Lungs clear to auscultation	  Psychiatry: A & O x 3, Mood & affect appropriate  Gastrointestinal:  Soft, Non-tender, + BS	  Skin: No rashes, No ecchymoses, No cyanosis	  Neurologic: Non-focal  Extremities: Normal range of motion, No clubbing, cyanosis        LABS:	 	    CBC Full  -  ( 04 Jun 2019 09:39 )  WBC Count : 6.48 K/uL  Hemoglobin : 7.6 g/dL  Hematocrit : 24.2 %  Platelet Count - Automated : 219 K/uL  Mean Cell Volume : 90.0 fl  Mean Cell Hemoglobin : 28.3 pg  Mean Cell Hemoglobin Concentration : 31.4 gm/dL  Auto Neutrophil # : 4.33 K/uL  Auto Lymphocyte # : 1.32 K/uL  Auto Monocyte # : 0.53 K/uL  Auto Eosinophil # : 0.23 K/uL  Auto Basophil # : 0.04 K/uL  Auto Neutrophil % : 66.8 %  Auto Lymphocyte % : 20.4 %  Auto Monocyte % : 8.2 %  Auto Eosinophil % : 3.5 %  Auto Basophil % : 0.6 %    PREVIOUS DIAGNOSTIC TESTING:    Echo 6/5/19  CORONARY VESSELS: The coronary circulationis left dominant.  LM:   --  LM: Angiography showed minor luminal irregularities with no flow  limiting lesions.  LAD:   --  Proximal LAD: There was a 80 % stenosis.  CX:   --  Circumflex: Angiography showed moderate atherosclerosis.  --  OM1: There was a 0 % stenosis at the site of a prior stent.  RCA:   --  Proximal RCA: The vessel was very small sized. Angiography  showed severe atherosclerosis.  COMPLICATIONS: There were no complications.  SUMMARY:  CORONARY VESSELS: LM: Angiography showed minor luminal irregularities with  no flow limiting lesions. Proximal LAD: There was a 80 % stenosis.  Circumflex: Angiography showed moderate atherosclerosis. OM1: There was a  0 % stenosis at the site of a prior stent. Proximal RCA: The vessel was  very small sized. Angiography showed severe atherosclerosis.  CARDIAC STRUCTURES: Global left ventricular function was normal. EF  estimated was 60 %.  DIAGNOSTIC RECOMMENDATIONS: OM IFR normal.  LAD IFR 0.52  INTERVENTIONAL RECOMMENDATIONS: Aspirin andplavix. Transplant delayed.  	  ASSESSMENT/PLAN: 	  62 yo male with ESRD not previously on HD (no fistula), CAD s/p stent 2009 and abnormal cath 9/2018, HTN, DMII, hypothyroidism, presents to the ED for cardiac cath for renal transplant evaluation.	    CAD  - s/p PCI to LAD on 6/5/19  - c/w asa, statin, BB, ARB  - d/c plavix; give 180mg brilinta now and start 90mg BID starting 6/7/19  - okay to d/c from cardiac standpoint    HTN  - BP elevated  - increase coreg to 12.5mg BID  - c/w losartan, hydralazine    ESRD on HD  - c/w dialysis  - would not hold antiplatelet for AVF placement      Laurie Colon MD  Cardiology Fellow  [phone]    All Cardiology service information can be found on [email or web address], password: [email or web address].

## 2019-06-06 NOTE — PROGRESS NOTE ADULT - SUBJECTIVE AND OBJECTIVE BOX
Patient is a 63y old  Male who presents with a chief complaint of needs cardiac cath and new HD (06 Jun 2019 06:28)      SUBJECTIVE / OVERNIGHT EVENTS: Patient seen and examined at bedside. He denies any CP, SOB, abd pain and n/v. States he experiences HA during HD, currently feeling better.     ROS:  All other review of systems negative    Allergies    No Known Allergies    Intolerances        MEDICATIONS  (STANDING):  aspirin enteric coated 81 milliGRAM(s) Oral daily  atorvastatin 40 milliGRAM(s) Oral at bedtime  calcium carbonate    500 mG (Tums) Chewable 2 Tablet(s) Chew three times a day  carvedilol 6.25 milliGRAM(s) Oral every 12 hours  chlorhexidine 4% Liquid 1 Application(s) Topical <User Schedule>  clopidogrel Tablet 75 milliGRAM(s) Oral daily  dextrose 5%. 1000 milliLiter(s) (50 mL/Hr) IV Continuous <Continuous>  dextrose 50% Injectable 12.5 Gram(s) IV Push once  dextrose 50% Injectable 25 Gram(s) IV Push once  dextrose 50% Injectable 25 Gram(s) IV Push once  furosemide    Tablet 40 milliGRAM(s) Oral two times a day  hydrALAZINE 75 milliGRAM(s) Oral three times a day  insulin lispro (HumaLOG) corrective regimen sliding scale   SubCutaneous three times a day before meals  insulin lispro (HumaLOG) corrective regimen sliding scale   SubCutaneous at bedtime  iron sucrose IVPB 200 milliGRAM(s) IV Intermittent every 24 hours  levothyroxine 50 MICROGram(s) Oral daily  losartan 50 milliGRAM(s) Oral daily  pantoprazole    Tablet 40 milliGRAM(s) Oral before breakfast    MEDICATIONS  (PRN):  dextrose 40% Gel 15 Gram(s) Oral once PRN Blood Glucose LESS THAN 70 milliGRAM(s)/deciliter  glucagon  Injectable 1 milliGRAM(s) IntraMuscular once PRN Glucose LESS THAN 70 milligrams/deciliter  sodium chloride 0.9% lock flush 10 milliLiter(s) IV Push every 1 hour PRN Pre/post blood products, medications, blood draw, and to maintain line patency      Vital Signs Last 24 Hrs  T(C): 36.8 (06 Jun 2019 12:05), Max: 36.9 (06 Jun 2019 04:47)  T(F): 98.3 (06 Jun 2019 12:05), Max: 98.5 (06 Jun 2019 04:47)  HR: 67 (06 Jun 2019 14:10) (62 - 72)  BP: 197/77 (06 Jun 2019 14:10) (142/60 - 197/77)  BP(mean): --  RR: 17 (06 Jun 2019 12:05) (17 - 18)  SpO2: 97% (06 Jun 2019 12:05) (97% - 98%)  CAPILLARY BLOOD GLUCOSE      POCT Blood Glucose.: 112 mg/dL (06 Jun 2019 13:57)  POCT Blood Glucose.: 103 mg/dL (06 Jun 2019 06:05)  POCT Blood Glucose.: 78 mg/dL (06 Jun 2019 05:46)  POCT Blood Glucose.: 226 mg/dL (06 Jun 2019 00:38)  POCT Blood Glucose.: 124 mg/dL (05 Jun 2019 17:12)    I&O's Summary    05 Jun 2019 07:01  -  06 Jun 2019 07:00  --------------------------------------------------------  IN: 1220 mL / OUT: 1800 mL / NET: -580 mL    06 Jun 2019 07:01  -  06 Jun 2019 15:02  --------------------------------------------------------  IN: 0 mL / OUT: 2000 mL / NET: -2000 mL        PHYSICAL EXAM:  GENERAL: NAD, well-developed  HEAD:  Atraumatic, Normocephalic  EYES: EOMI, PERRLA, conjunctiva and sclera clear  NECK: Supple, No JVD  CHEST/LUNG: Clear to auscultation bilaterally; No wheeze  HEART: Regular rate and rhythm; No murmurs, rubs, or gallops  ABDOMEN: Soft, Nontender, Nondistended; Bowel sounds present  EXTREMITIES:  2+ Peripheral Pulses, No clubbing, cyanosis, or edema  NEUROLOGY: AAOx3, non-focal    LABS:                        7.5    6.16  )-----------( 203      ( 06 Jun 2019 09:20 )             23.8     06-06    139  |  97  |  41<H>  ----------------------------<  64<L>  3.5   |  26  |  5.45<H>    Ca    7.0<L>      06 Jun 2019 06:00  Phos  4.0     06-06      PT/INR - ( 06 Jun 2019 09:25 )   PT: 15.7 sec;   INR: 1.38 ratio                   RADIOLOGY & ADDITIONAL TESTS:    Consultant(s) Notes Reviewed:  Cards, Vascular surg and Nephrology rec noted     Care Discussed with Consultants/Other Providers: Medicine NP     Case Discussed with son at bedside

## 2019-06-06 NOTE — DISCHARGE NOTE PROVIDER - NSDCCAREPROVSEEN_GEN_ALL_CORE_FT
Jennifer Valerio Jennifer Valerio  [facility] Medicine, Advance PracticeTeam  Tesfaye Cardenas

## 2019-06-06 NOTE — DISCHARGE NOTE PROVIDER - CARE PROVIDERS DIRECT ADDRESSES
[email or web address]

## 2019-06-06 NOTE — PROGRESS NOTE ADULT - PROBLEM SELECTOR PLAN 5
Hyperphosphatemia and hypocalcemia from advanced CKD. , in range.   - Phos in 6+ range. Will dialyze with her Ca bath.    Once phos improved, can start on hectorol w/ HD.   - C/w tums 2 tabs TID w/ meals  - Monitor Ca and phos levels daily.

## 2019-06-06 NOTE — PROGRESS NOTE ADULT - PROBLEM SELECTOR PLAN 4
s/p LHC with 1 stent to LAD  - c/w plavix   - continue aspirin, Lipitor, ARB.   - c/w coreg per renal rec

## 2019-06-06 NOTE — DISCHARGE NOTE PROVIDER - NSDCFUADDAPPT_GEN_ALL_CORE_FT
Follow up with your Cardiologist as an outpatient.  Follow up with Nephrologist as an outpatient, continue with your HD session as an outpatient.   Follow up with your PMD as an outpatient.

## 2019-06-06 NOTE — PROGRESS NOTE ADULT - PROBLEM SELECTOR PLAN 2
BPs elevated, but improved.   - C/w UF with HD  - C/w losartan at 50mg/d  - C/w Coreg   - C/w lasix  - Monitor BPs

## 2019-06-06 NOTE — PROGRESS NOTE ADULT - PROBLEM SELECTOR PLAN 2
improving   Continue medications: - hydralazine PO 50 mg TID, lasix 40 mg BID (all home medications).  -c/w hydralazine to 75 mg TID  - c/w losartan 50 mg daily

## 2019-06-06 NOTE — PROGRESS NOTE ADULT - PROBLEM SELECTOR PLAN 1
CKDV likely 2/2 DM and HTN, now ESRD. Started on HD 6/4. BPs improved with increased UF (and increased losartan).    - Have IR place tunneled catheter  - Will plan for full HD session tomorrow, Friday, then hold off over weekend.  - NO AVF needed at this time; had only 1 LAD stent so may just need to wait 3 months for transplant.   - Please have pt set up at [facility] HD center- f/u Case Management

## 2019-06-06 NOTE — DISCHARGE NOTE PROVIDER - HOSPITAL COURSE
62 y/o M PMH ESRD not previously on HD, DMII on insulin, CAD, HTN, HLD, hypothyroidism, presenting per advice of MD to get cardiac cath for clearance prior to receiving renal transplant now s/p C PCI to LAD on 6/5/19, receiving HD. Hosp course c/b venous air embolism, now extubated and with hypertensive crisis. Pending set up of home HD.            ·  Problem: End stage renal disease.  Plan: s/p tunnel catheter placement by IR on 6/7 complicated by Hypotension followed by hypertension now improved    - now receiving HD and setup for home HD    - no need for AVF per nephro as pt maybe able to proceed with renal transplant in 3 mo    - calcium carbonate supplements TID (per renal recs- for hypocalcemia)    - plan for HD on MWF as per nephrology    - set up of outpatient HD as per case management.             ·  Problem: Hypertensive urgency.  Plan: BP now improved, hypertensive crisis    - c/w Incr Losartan to 100mg oral daily     - CW Coreg 6.25 mg every 12 hours     - hydral decr to 50 mg tid given BP now on lower side 100s-120s and pt w/ mild dizziness    - cw Furosemide 40 mg po BID     - monitor.             ·  Problem: Anemia due to chronic kidney disease.  Plan: Hgb 8.1 no active bleeding reported, FOBT negative.    Likely in setting of chronic kidney disease.     - procrit injections    - monitor.         ·  Problem: CAD (Coronary Artery Disease).  Plan: s/p C with 1 stent to LAD on 6/5    - continue aspirin, Lipitor, ARB, coreg, brillinta.             ·  Problem: Type 2 diabetes mellitus with chronic kidney disease, with long-term current use of insulin, unspecified CKD stage.  Plan: At home pt on levemir 8U qhs, novolog 7/8/7U pre-meals.     - c/w lantus 8 u home dose    - c/w lispro 3 u tid    - monitor fs.             Problem: Dyslipidemia. Plan: d/w pt and family, lipitor preferred in HD pts    - ok w/ changing to lipitor 80 mg po qd.            ·  Problem: Hypothyroidism.  Plan: No active issues.    - Continue home medication levothyroxine 50 mcg daily.         Pt stable dc home with outpt follow up with PMD, cardiologist and nephrologist. 62 y/o M PMH ESRD not previously on HD, DMII on insulin, CAD, HTN, HLD, hypothyroidism, presenting per advice of MD to get cardiac cath for clearance prior to receiving renal transplant now s/p LHC PCI to LAD on 6/5/19 during hosp stay, receiving HD. Hosp course c/b venous air embolism, now extubated and with hypertensive crisis. Pending set up of home HD. Pt is s/p tunnel cath by IR on 6/7 which was complicated by venous air embolism, intubated and then extubated. He received multiple sessions of HD per renal and was pending set up of outpatient HD as he is a new dialysis patient. Noted to have hypertensive crisis - HTN urgency with improvement of BP with titration of medications. He later experienced some dizziness and hydralazine was decreased with improvement in symptoms. Pt noted to have anemia, stable. Hyponatremia mild. He had positive hep b core ab c/w hx of infection with negative hep b igm.        Pt stable dc home with outpt follow up with PMD, cardiologist and nephrologist.

## 2019-06-06 NOTE — DISCHARGE NOTE PROVIDER - PROVIDER TOKENS
PROVIDER:[TOKEN:[[id number]]],PROVIDER:[TOKEN:[[id number]]],PROVIDER:[TOKEN:[[id number]]]

## 2019-06-06 NOTE — PROGRESS NOTE ADULT - PROBLEM SELECTOR PLAN 5
- pt on levemir 8U qhs, novolog 7/8/7U pre-meals.   - diet: renal   - reinstate home 8 units as pt is no longer NPO.

## 2019-06-06 NOTE — PROGRESS NOTE ADULT - SUBJECTIVE AND OBJECTIVE BOX
[facility] DIVISION OF KIDNEY DISEASES AND HYPERTENSION -- FOLLOW UP NOTE  --------------------------------------------------------------------------------  Chief Complaint:  CKD    24 hour events/subjective:  Seen on HD. Tolerating well.       PAST HISTORY  --------------------------------------------------------------------------------  No significant changes to PMH, PSH, FHx, SHx, unless otherwise noted    ALLERGIES & MEDICATIONS  --------------------------------------------------------------------------------  Allergies    No Known Allergies    Intolerances      Standing Inpatient Medications  aspirin enteric coated 81 milliGRAM(s) Oral daily  atorvastatin 40 milliGRAM(s) Oral at bedtime  calcium carbonate    500 mG (Tums) Chewable 2 Tablet(s) Chew three times a day  carvedilol 6.25 milliGRAM(s) Oral every 12 hours  chlorhexidine 4% Liquid 1 Application(s) Topical <User Schedule>  clopidogrel Tablet 75 milliGRAM(s) Oral daily  dextrose 5%. 1000 milliLiter(s) IV Continuous <Continuous>  dextrose 50% Injectable 12.5 Gram(s) IV Push once  dextrose 50% Injectable 25 Gram(s) IV Push once  dextrose 50% Injectable 25 Gram(s) IV Push once  furosemide    Tablet 40 milliGRAM(s) Oral two times a day  hydrALAZINE 75 milliGRAM(s) Oral three times a day  insulin lispro (HumaLOG) corrective regimen sliding scale   SubCutaneous three times a day before meals  insulin lispro (HumaLOG) corrective regimen sliding scale   SubCutaneous at bedtime  iron sucrose IVPB 200 milliGRAM(s) IV Intermittent every 24 hours  levothyroxine 50 MICROGram(s) Oral daily  losartan 50 milliGRAM(s) Oral daily  pantoprazole    Tablet 40 milliGRAM(s) Oral before breakfast    PRN Inpatient Medications  dextrose 40% Gel 15 Gram(s) Oral once PRN  glucagon  Injectable 1 milliGRAM(s) IntraMuscular once PRN  sodium chloride 0.9% lock flush 10 milliLiter(s) IV Push every 1 hour PRN      REVIEW OF SYSTEMS  --------------------------------------------------------------------------------  Gen: no fatigue, fevers/chills  Respiratory: No dyspnea, cough  CV: No chest pain  GI: No abdominal pain  : No dysuria, hematuria  MSK: No edema    VITALS/PHYSICAL EXAM  --------------------------------------------------------------------------------  T(C): 36.8 (06-06-19 @ 12:05), Max: 36.9 (06-06-19 @ 04:47)  HR: 67 (06-06-19 @ 14:10) (62 - 72)  BP: 197/77 (06-06-19 @ 14:10) (142/60 - 197/77)  RR: 17 (06-06-19 @ 12:05) (17 - 18)  SpO2: 97% (06-06-19 @ 12:05) (97% - 98%)  Wt(kg): --        06-05-19 @ 07:01  -  06-06-19 @ 07:00  --------------------------------------------------------  IN: 1220 mL / OUT: 1800 mL / NET: -580 mL    06-06-19 @ 07:01  -  06-06-19 @ 14:55  --------------------------------------------------------  IN: 0 mL / OUT: 2000 mL / NET: -2000 mL      Physical Exam:  	Gen: NAD  	Pulm: CTA B/L  	CV: RRR, S1S2; no rub  	Abd: +BS, soft, nontender/nondistended  	LE: Warm, no edema  	Neuro: follows commands  	Psych: Normal affect and mood  	Skin: Warm, without rashes  	Vascular access: +RIJ non-tunneled HD catheter    LABS/STUDIES  --------------------------------------------------------------------------------              7.5    6.16  >-----------<  203      [06-06-19 @ 09:20]              23.8     139  |  97  |  41  ----------------------------<  64      [06-06-19 @ 06:00]  3.5   |  26  |  5.45        Ca     7.0     [06-06-19 @ 06:00]      Phos  4.0     [06-06-19 @ 06:00]      PT/INR: PT 15.7 , INR 1.38       [06-06-19 @ 09:25]      Creatinine Trend:  SCr 5.45 [06-06 @ 06:00]  SCr 8.89 [06-04 @ 05:32]  SCr 8.13 [06-03 @ 07:53]        Iron 26, TIBC 205, %sat 13      [06-04-19 @ 09:27]  Ferritin 99      [06-04-19 @ 09:33]  PTH -- (Ca 6.9)      [06-05-19 @ 08:14]   562  HbA1c 5.8      [06-06-19 @ 09:20]    HBsAb >1000.0      [06-03-19 @ 15:57]  HBsAg Nonreact      [06-03-19 @ 15:57]  HBcAb Reactive      [06-03-19 @ 15:57]  HCV 0.13, Nonreact      [06-03-19 @ 15:57]

## 2019-06-07 LAB
ANION GAP SERPL CALC-SCNC: 13 MMOL/L — SIGNIFICANT CHANGE UP (ref 5–17)
BUN SERPL-MCNC: 34 MG/DL — HIGH (ref 7–23)
CALCIUM SERPL-MCNC: 7.4 MG/DL — LOW (ref 8.4–10.5)
CHLORIDE SERPL-SCNC: 95 MMOL/L — LOW (ref 96–108)
CO2 SERPL-SCNC: 27 MMOL/L — SIGNIFICANT CHANGE UP (ref 22–31)
CREAT SERPL-MCNC: 5.43 MG/DL — HIGH (ref 0.5–1.3)
GLUCOSE BLDC GLUCOMTR-MCNC: 130 MG/DL — HIGH (ref 70–99)
GLUCOSE BLDC GLUCOMTR-MCNC: 171 MG/DL — HIGH (ref 70–99)
GLUCOSE BLDC GLUCOMTR-MCNC: 178 MG/DL — HIGH (ref 70–99)
GLUCOSE BLDC GLUCOMTR-MCNC: 180 MG/DL — HIGH (ref 70–99)
GLUCOSE BLDC GLUCOMTR-MCNC: 227 MG/DL — HIGH (ref 70–99)
GLUCOSE BLDC GLUCOMTR-MCNC: 229 MG/DL — HIGH (ref 70–99)
GLUCOSE BLDC GLUCOMTR-MCNC: 354 MG/DL — HIGH (ref 70–99)
GLUCOSE SERPL-MCNC: 193 MG/DL — HIGH (ref 70–99)
HCT VFR BLD CALC: 25.2 % — LOW (ref 39–50)
HGB BLD-MCNC: 8.1 G/DL — LOW (ref 13–17)
MCHC RBC-ENTMCNC: 28.2 PG — SIGNIFICANT CHANGE UP (ref 27–34)
MCHC RBC-ENTMCNC: 32.1 GM/DL — SIGNIFICANT CHANGE UP (ref 32–36)
MCV RBC AUTO: 87.8 FL — SIGNIFICANT CHANGE UP (ref 80–100)
PLATELET # BLD AUTO: 233 K/UL — SIGNIFICANT CHANGE UP (ref 150–400)
POTASSIUM SERPL-MCNC: 3.9 MMOL/L — SIGNIFICANT CHANGE UP (ref 3.5–5.3)
POTASSIUM SERPL-SCNC: 3.9 MMOL/L — SIGNIFICANT CHANGE UP (ref 3.5–5.3)
RBC # BLD: 2.87 M/UL — LOW (ref 4.2–5.8)
RBC # FLD: 13.2 % — SIGNIFICANT CHANGE UP (ref 10.3–14.5)
SODIUM SERPL-SCNC: 135 MMOL/L — SIGNIFICANT CHANGE UP (ref 135–145)
WBC # BLD: 7.58 K/UL — SIGNIFICANT CHANGE UP (ref 3.8–10.5)
WBC # FLD AUTO: 7.58 K/UL — SIGNIFICANT CHANGE UP (ref 3.8–10.5)

## 2019-06-07 PROCEDURE — 93010 ELECTROCARDIOGRAM REPORT: CPT

## 2019-06-07 PROCEDURE — 77001 FLUOROGUIDE FOR VEIN DEVICE: CPT | Mod: 26

## 2019-06-07 PROCEDURE — 99223 1ST HOSP IP/OBS HIGH 75: CPT | Mod: GC,25

## 2019-06-07 PROCEDURE — 99233 SBSQ HOSP IP/OBS HIGH 50: CPT

## 2019-06-07 PROCEDURE — 76604 US EXAM CHEST: CPT | Mod: 26,GC

## 2019-06-07 PROCEDURE — 99232 SBSQ HOSP IP/OBS MODERATE 35: CPT | Mod: GC

## 2019-06-07 PROCEDURE — 93308 TTE F-UP OR LMTD: CPT | Mod: 26,GC

## 2019-06-07 PROCEDURE — 99233 SBSQ HOSP IP/OBS HIGH 50: CPT | Mod: GC

## 2019-06-07 PROCEDURE — 36558 INSERT TUNNELED CV CATH: CPT

## 2019-06-07 RX ORDER — DEXTROSE 50 % IN WATER 50 %
12.5 SYRINGE (ML) INTRAVENOUS ONCE
Refills: 0 | Status: DISCONTINUED | OUTPATIENT
Start: 2019-06-07 | End: 2019-06-11

## 2019-06-07 RX ORDER — ROSUVASTATIN CALCIUM 5 MG/1
40 TABLET ORAL AT BEDTIME
Refills: 0 | Status: DISCONTINUED | OUTPATIENT
Start: 2019-06-07 | End: 2019-06-11

## 2019-06-07 RX ORDER — SODIUM CHLORIDE 9 MG/ML
1000 INJECTION, SOLUTION INTRAVENOUS
Refills: 0 | Status: DISCONTINUED | OUTPATIENT
Start: 2019-06-07 | End: 2019-06-11

## 2019-06-07 RX ORDER — GLUCAGON INJECTION, SOLUTION 0.5 MG/.1ML
1 INJECTION, SOLUTION SUBCUTANEOUS ONCE
Refills: 0 | Status: DISCONTINUED | OUTPATIENT
Start: 2019-06-07 | End: 2019-06-11

## 2019-06-07 RX ORDER — DOXERCALCIFEROL 2.5 UG/1
1 CAPSULE ORAL
Refills: 0 | Status: DISCONTINUED | OUTPATIENT
Start: 2019-06-07 | End: 2019-06-11

## 2019-06-07 RX ORDER — INSULIN LISPRO 100/ML
VIAL (ML) SUBCUTANEOUS
Refills: 0 | Status: DISCONTINUED | OUTPATIENT
Start: 2019-06-07 | End: 2019-06-11

## 2019-06-07 RX ORDER — INSULIN LISPRO 100/ML
VIAL (ML) SUBCUTANEOUS AT BEDTIME
Refills: 0 | Status: DISCONTINUED | OUTPATIENT
Start: 2019-06-07 | End: 2019-06-11

## 2019-06-07 RX ORDER — HEPARIN SODIUM 5000 [USP'U]/ML
5000 INJECTION INTRAVENOUS; SUBCUTANEOUS EVERY 8 HOURS
Refills: 0 | Status: DISCONTINUED | OUTPATIENT
Start: 2019-06-07 | End: 2019-06-11

## 2019-06-07 RX ORDER — DEXTROSE 50 % IN WATER 50 %
25 SYRINGE (ML) INTRAVENOUS ONCE
Refills: 0 | Status: DISCONTINUED | OUTPATIENT
Start: 2019-06-07 | End: 2019-06-11

## 2019-06-07 RX ORDER — FENTANYL CITRATE 50 UG/ML
25 INJECTION INTRAVENOUS
Refills: 0 | Status: DISCONTINUED | OUTPATIENT
Start: 2019-06-07 | End: 2019-06-07

## 2019-06-07 RX ORDER — LOSARTAN POTASSIUM 100 MG/1
50 TABLET, FILM COATED ORAL DAILY
Refills: 0 | Status: DISCONTINUED | OUTPATIENT
Start: 2019-06-07 | End: 2019-06-09

## 2019-06-07 RX ORDER — LOSARTAN POTASSIUM 100 MG/1
100 TABLET, FILM COATED ORAL DAILY
Refills: 0 | Status: DISCONTINUED | OUTPATIENT
Start: 2019-06-07 | End: 2019-06-07

## 2019-06-07 RX ORDER — INSULIN GLARGINE 100 [IU]/ML
5 INJECTION, SOLUTION SUBCUTANEOUS AT BEDTIME
Refills: 0 | Status: DISCONTINUED | OUTPATIENT
Start: 2019-06-07 | End: 2019-06-10

## 2019-06-07 RX ORDER — LOSARTAN POTASSIUM 100 MG/1
50 TABLET, FILM COATED ORAL ONCE
Refills: 0 | Status: COMPLETED | OUTPATIENT
Start: 2019-06-07 | End: 2019-06-07

## 2019-06-07 RX ORDER — HYDRALAZINE HCL 50 MG
5 TABLET ORAL ONCE
Refills: 0 | Status: COMPLETED | OUTPATIENT
Start: 2019-06-07 | End: 2019-06-07

## 2019-06-07 RX ORDER — DOCUSATE SODIUM 100 MG
100 CAPSULE ORAL THREE TIMES A DAY
Refills: 0 | Status: DISCONTINUED | OUTPATIENT
Start: 2019-06-07 | End: 2019-06-11

## 2019-06-07 RX ORDER — ONDANSETRON 8 MG/1
4 TABLET, FILM COATED ORAL ONCE
Refills: 0 | Status: DISCONTINUED | OUTPATIENT
Start: 2019-06-07 | End: 2019-06-07

## 2019-06-07 RX ORDER — SENNA PLUS 8.6 MG/1
2 TABLET ORAL AT BEDTIME
Refills: 0 | Status: DISCONTINUED | OUTPATIENT
Start: 2019-06-07 | End: 2019-06-11

## 2019-06-07 RX ORDER — ACETAMINOPHEN 500 MG
1000 TABLET ORAL ONCE
Refills: 0 | Status: DISCONTINUED | OUTPATIENT
Start: 2019-06-07 | End: 2019-06-07

## 2019-06-07 RX ORDER — DEXTROSE 50 % IN WATER 50 %
15 SYRINGE (ML) INTRAVENOUS ONCE
Refills: 0 | Status: DISCONTINUED | OUTPATIENT
Start: 2019-06-07 | End: 2019-06-11

## 2019-06-07 RX ADMIN — DOXERCALCIFEROL 1 MICROGRAM(S): 2.5 CAPSULE ORAL at 20:39

## 2019-06-07 RX ADMIN — TICAGRELOR 90 MILLIGRAM(S): 90 TABLET ORAL at 23:04

## 2019-06-07 RX ADMIN — TICAGRELOR 90 MILLIGRAM(S): 90 TABLET ORAL at 05:46

## 2019-06-07 RX ADMIN — Medication 75 MILLIGRAM(S): at 05:46

## 2019-06-07 RX ADMIN — Medication 1: at 11:15

## 2019-06-07 RX ADMIN — Medication 100 MILLIGRAM(S): at 23:06

## 2019-06-07 RX ADMIN — LOSARTAN POTASSIUM 50 MILLIGRAM(S): 100 TABLET, FILM COATED ORAL at 13:49

## 2019-06-07 RX ADMIN — Medication 50 MICROGRAM(S): at 05:46

## 2019-06-07 RX ADMIN — Medication 75 MILLIGRAM(S): at 13:50

## 2019-06-07 RX ADMIN — LOSARTAN POTASSIUM 50 MILLIGRAM(S): 100 TABLET, FILM COATED ORAL at 05:46

## 2019-06-07 RX ADMIN — Medication 2 TABLET(S): at 13:50

## 2019-06-07 RX ADMIN — CARVEDILOL PHOSPHATE 6.25 MILLIGRAM(S): 80 CAPSULE, EXTENDED RELEASE ORAL at 23:04

## 2019-06-07 RX ADMIN — INSULIN GLARGINE 5 UNIT(S): 100 INJECTION, SOLUTION SUBCUTANEOUS at 23:05

## 2019-06-07 RX ADMIN — Medication 40 MILLIGRAM(S): at 05:46

## 2019-06-07 RX ADMIN — ROSUVASTATIN CALCIUM 40 MILLIGRAM(S): 5 TABLET ORAL at 23:04

## 2019-06-07 RX ADMIN — HEPARIN SODIUM 5000 UNIT(S): 5000 INJECTION INTRAVENOUS; SUBCUTANEOUS at 23:06

## 2019-06-07 RX ADMIN — Medication 5 MILLIGRAM(S): at 11:45

## 2019-06-07 RX ADMIN — Medication 75 MILLIGRAM(S): at 21:11

## 2019-06-07 RX ADMIN — Medication 81 MILLIGRAM(S): at 11:11

## 2019-06-07 RX ADMIN — CHLORHEXIDINE GLUCONATE 1 APPLICATION(S): 213 SOLUTION TOPICAL at 13:52

## 2019-06-07 RX ADMIN — Medication 2 TABLET(S): at 23:04

## 2019-06-07 RX ADMIN — Medication 2 TABLET(S): at 05:47

## 2019-06-07 RX ADMIN — CARVEDILOL PHOSPHATE 6.25 MILLIGRAM(S): 80 CAPSULE, EXTENDED RELEASE ORAL at 05:46

## 2019-06-07 RX ADMIN — PANTOPRAZOLE SODIUM 40 MILLIGRAM(S): 20 TABLET, DELAYED RELEASE ORAL at 05:46

## 2019-06-07 RX ADMIN — SENNA PLUS 2 TABLET(S): 8.6 TABLET ORAL at 23:06

## 2019-06-07 RX ADMIN — IRON SUCROSE 110 MILLIGRAM(S): 20 INJECTION, SOLUTION INTRAVENOUS at 21:18

## 2019-06-07 RX ADMIN — Medication 40 MILLIGRAM(S): at 23:04

## 2019-06-07 NOTE — CONSULT NOTE ADULT - ATTENDING COMMENTS
I have examined pt and agree with above exam and plan. Pt wit transient hypotension in OR during placement of dialysis catheter. In PACU vitals are stable. No evidence of hypotension , hypoxemia or focal neurologic signs. Pt currently has physiologic signs of meaningful air embolism.      Pt is not a MICU candidate at this time.
I have seen this patient with the fellow and agree with their assessment and plan. In addition, CKDV patient was doing well till worsening crt and recent cath showing concerning lesion as part of his renal transplant workup. Given he has a donor and the short period he will require dialysis, HD was chosen over his initial choice of PD. Admitted for starting dialysis and cardiac cath     IR for permacath today and start HD  cath planning per cards( Dr Bose is his primary cardiologist)  Give lasix now for HTN and volume overload  He gets aranesp as outpatient 40mcg q month, can continue that if no thrombotic event in his cath and no stents done  Cont tums for now, check phos and pth    Tesfaye Cardenas MD  Cell   Pager   Office

## 2019-06-07 NOTE — PROGRESS NOTE ADULT - PROBLEM SELECTOR PLAN 5
Hyperphosphatemia and hypocalcemia from advanced CKD. , in range. Phos improved w/ increased tums.   - Start hectolol w/ HD.   - C/w tums 2 tabs TID w/ meals  - Monitor Ca and phos levels daily.

## 2019-06-07 NOTE — CONSULT NOTE ADULT - SUBJECTIVE AND OBJECTIVE BOX
CHIEF COMPLAINT:    HPI:    PAST MEDICAL & SURGICAL HISTORY:  CRI (Chronic Renal Insufficiency)  CAD (Coronary Artery Disease): with Stents in 06/2009  CAD (Coronary Artery Disease)  Dyslipidemia  Diabetes  Hypertension  No significant past surgical history      FAMILY HISTORY:  No pertinent family history in first degree relatives      SOCIAL HISTORY:  Smoking: [ ] Never Smoked [ ] Former Smoker (__ packs x ___ years) [ ] Current Smoker  (__ packs x ___ years)  Substance Use: [ ] Never Used [ ] Used ____  EtOH Use:  Marital Status: [ ] Single [ ]  [ ]  [ ]   Sexual History:   Occupation:  Recent Travel:  Country of Birth:  Advance Directives:    Allergies    No Known Allergies    Intolerances        HOME MEDICATIONS:  Home Medications:  aspirin 81 mg oral tablet: 1 tab(s) orally once a day (03 Jun 2019 09:50)  atorvastatin 40 mg oral tablet: 1 tab(s) orally once a day (03 Jun 2019 09:50)  ferrous sulfate 325 mg (65 mg elemental iron) oral tablet: 1 tab(s) orally once a day (03 Jun 2019 09:50)  hydrALAZINE 50 mg oral tablet: 1 tab(s) orally 3 times a day (03 Jun 2019 09:50)  Lasix 40 mg oral tablet: 1 tab(s) orally 2 times a day (03 Jun 2019 09:50)  Levemir FlexPen 100 units/mL subcutaneous solution: 8 unit(s) subcutaneous once a day (at bedtime) (03 Jun 2019 09:50)  levothyroxine 50 mcg (0.05 mg) oral tablet: 1 tab(s) orally once a day (03 Jun 2019 09:50)  losartan 25 mg oral tablet: 1 tab(s) orally once a day (03 Jun 2019 09:50)  NovoLOG FlexPen 100 units/mL injectable solution: 7 unit(s) injectable 2 times a day in the am and pm (03 Jun 2019 09:50)  NovoLOG FlexPen 100 units/mL injectable solution: 8 unit(s) injectable, at lunch (03 Jun 2019 09:50)  omeprazole 20 mg oral delayed release tablet: 1 tab(s) orally once a day (03 Jun 2019 09:50)  sodium bicarbonate 650 mg oral tablet: 1 tab(s) orally 3 times a day (03 Jun 2019 09:50)  Toprol-XL 50 mg oral tablet, extended release: 1 tab(s) orally once a day (03 Jun 2019 09:50)      REVIEW OF SYSTEMS:  Constitutional: [ ] negative [ ] fevers [ ] chills [ ] weight loss [ ] weight gain  HEENT: [ ] negative [ ] dry eyes [ ] eye irritation [ ] postnasal drip [ ] nasal congestion  CV: [ ] negative  [ ] chest pain [ ] orthopnea [ ] palpitations [ ] murmur  Resp: [ ] negative [ ] cough [ ] shortness of breath [ ] dyspnea [ ] wheezing [ ] sputum [ ] hemoptysis  GI: [ ] negative [ ] nausea [ ] vomiting [ ] diarrhea [ ] constipation [ ] abd pain [ ] dysphagia   : [ ] negative [ ] dysuria [ ] nocturia [ ] hematuria [ ] increased urinary frequency  Musculoskeletal: [ ] negative [ ] back pain [ ] myalgias [ ] arthralgias [ ] fracture  Skin: [ ] negative [ ] rash [ ] itch  Neurological: [ ] negative [ ] headache [ ] dizziness [ ] syncope [ ] weakness [ ] numbness  Psychiatric: [ ] negative [ ] anxiety [ ] depression  Endocrine: [ ] negative [ ] diabetes [ ] thyroid problem  Hematologic/Lymphatic: [ ] negative [ ] anemia [ ] bleeding problem  Allergic/Immunologic: [ ] negative [ ] itchy eyes [ ] nasal discharge [ ] hives [ ] angioedema  [ ] All other systems negative  [ ] Unable to assess ROS because ________    OBJECTIVE:  ICU Vital Signs Last 24 Hrs  T(C): 36 (07 Jun 2019 10:05), Max: 36.8 (06 Jun 2019 12:05)  T(F): 96.8 (07 Jun 2019 10:05), Max: 98.3 (06 Jun 2019 12:05)  HR: 68 (07 Jun 2019 11:00) (65 - 69)  BP: 175/92 (07 Jun 2019 11:00) (137/68 - 197/77)  BP(mean): 114 (07 Jun 2019 10:45) (93 - 118)  ABP: --  ABP(mean): --  RR: 16 (07 Jun 2019 11:00) (14 - 18)  SpO2: 100% (07 Jun 2019 11:00) (97% - 100%)        06-06 @ 07:01  -  06-07 @ 07:00  --------------------------------------------------------  IN: 300 mL / OUT: 2000 mL / NET: -1700 mL      CAPILLARY BLOOD GLUCOSE      POCT Blood Glucose.: 180 mg/dL (07 Jun 2019 07:13)      PHYSICAL EXAM:  General:   HEENT:   Lymph Nodes:  Neck:   Respiratory:   Cardiovascular:   Abdomen:   Extremities:   Skin:   Neurological:  Psychiatry:    LINES:     HOSPITAL MEDICATIONS:  Standing Meds:  acetaminophen  IVPB .. 1000 milliGRAM(s) IV Intermittent once  aspirin enteric coated 81 milliGRAM(s) Oral daily  atorvastatin 40 milliGRAM(s) Oral at bedtime  calcium carbonate    500 mG (Tums) Chewable 2 Tablet(s) Chew three times a day  carvedilol 6.25 milliGRAM(s) Oral every 12 hours  chlorhexidine 4% Liquid 1 Application(s) Topical <User Schedule>  dextrose 5%. 1000 milliLiter(s) IV Continuous <Continuous>  dextrose 50% Injectable 12.5 Gram(s) IV Push once  dextrose 50% Injectable 25 Gram(s) IV Push once  dextrose 50% Injectable 25 Gram(s) IV Push once  furosemide    Tablet 40 milliGRAM(s) Oral two times a day  heparin  Injectable 5000 Unit(s) SubCutaneous every 8 hours  hydrALAZINE 75 milliGRAM(s) Oral three times a day  insulin lispro (HumaLOG) corrective regimen sliding scale   SubCutaneous three times a day before meals  insulin lispro (HumaLOG) corrective regimen sliding scale   SubCutaneous at bedtime  iron sucrose IVPB 200 milliGRAM(s) IV Intermittent every 24 hours  levothyroxine 50 MICROGram(s) Oral daily  losartan 50 milliGRAM(s) Oral daily  pantoprazole    Tablet 40 milliGRAM(s) Oral before breakfast  ticagrelor 90 milliGRAM(s) Oral two times a day      PRN Meds:  dextrose 40% Gel 15 Gram(s) Oral once PRN  fentaNYL    Injectable 25 MICROGram(s) IV Push every 5 minutes PRN  glucagon  Injectable 1 milliGRAM(s) IntraMuscular once PRN  ondansetron Injectable 4 milliGRAM(s) IV Push once PRN  sodium chloride 0.9% lock flush 10 milliLiter(s) IV Push every 1 hour PRN      LABS:                        8.1    7.58  )-----------( 233      ( 07 Jun 2019 08:44 )             25.2     Hgb Trend: 8.1<--, 7.5<--, 7.6<--, 8.7<--  06-07    135  |  95<L>  |  34<H>  ----------------------------<  193<H>  3.9   |  27  |  5.43<H>    Ca    7.4<L>      07 Jun 2019 06:11  Phos  4.0     06-06      Creatinine Trend: 5.43<--, 5.45<--, 8.89<--, 8.13<--  PT/INR - ( 06 Jun 2019 09:25 )   PT: 15.7 sec;   INR: 1.38 ratio                   MICROBIOLOGY:       RADIOLOGY:  [ ] Reviewed and interpreted by me    EKG: CHIEF COMPLAINT:  called for embolism    HPI:    ESRD not previously on HD, DMII on insulin, CAD, HTN,  Presented initially for cardiac catheterization as part of workup for possible renal transplant.     Post-cath has had oliguric renal failure requiring dialysis.     Now had tunneled dialysis catheter placed today. During procedure, reported desaturation, hypotension, decrease in EtCO2 which responded to ephedrine and intubation.   Patient extubated to room air. He is now asymptomatic and feels well.     PAST MEDICAL & SURGICAL HISTORY:  CRI (Chronic Renal Insufficiency)  CAD (Coronary Artery Disease): with Stents in 06/2009  CAD (Coronary Artery Disease)  Dyslipidemia  Diabetes  Hypertension  No significant past surgical history      FAMILY HISTORY:  No pertinent family history in first degree relatives      SOCIAL HISTORY:  Smoking: [ ] Never Smoked [ ] Former Smoker (__ packs x ___ years) [ ] Current Smoker  (__ packs x ___ years)  Substance Use: [ ] Never Used [ ] Used ____  EtOH Use:  Marital Status: [ ] Single [ ]  [ ]  [ ]   Sexual History:   Occupation:  Recent Travel:  Country of Birth:  Advance Directives:    Allergies    No Known Allergies    Intolerances        HOME MEDICATIONS:  Home Medications:  aspirin 81 mg oral tablet: 1 tab(s) orally once a day (03 Jun 2019 09:50)  atorvastatin 40 mg oral tablet: 1 tab(s) orally once a day (03 Jun 2019 09:50)  ferrous sulfate 325 mg (65 mg elemental iron) oral tablet: 1 tab(s) orally once a day (03 Jun 2019 09:50)  hydrALAZINE 50 mg oral tablet: 1 tab(s) orally 3 times a day (03 Jun 2019 09:50)  Lasix 40 mg oral tablet: 1 tab(s) orally 2 times a day (03 Jun 2019 09:50)  Levemir FlexPen 100 units/mL subcutaneous solution: 8 unit(s) subcutaneous once a day (at bedtime) (03 Jun 2019 09:50)  levothyroxine 50 mcg (0.05 mg) oral tablet: 1 tab(s) orally once a day (03 Jun 2019 09:50)  losartan 25 mg oral tablet: 1 tab(s) orally once a day (03 Jun 2019 09:50)  NovoLOG FlexPen 100 units/mL injectable solution: 7 unit(s) injectable 2 times a day in the am and pm (03 Jun 2019 09:50)  NovoLOG FlexPen 100 units/mL injectable solution: 8 unit(s) injectable, at lunch (03 Jun 2019 09:50)  omeprazole 20 mg oral delayed release tablet: 1 tab(s) orally once a day (03 Jun 2019 09:50)  sodium bicarbonate 650 mg oral tablet: 1 tab(s) orally 3 times a day (03 Jun 2019 09:50)  Toprol-XL 50 mg oral tablet, extended release: 1 tab(s) orally once a day (03 Jun 2019 09:50)      REVIEW OF SYSTEMS:  Constitutional: [ ] negative [ ] fevers [ ] chills [ ] weight loss [ ] weight gain  HEENT: [ ] negative [ ] dry eyes [ ] eye irritation [ ] postnasal drip [ ] nasal congestion  CV: [ ] negative  [ ] chest pain [ ] orthopnea [ ] palpitations [ ] murmur  Resp: [ ] negative [ ] cough [ ] shortness of breath [ ] dyspnea [ ] wheezing [ ] sputum [ ] hemoptysis  GI: [ ] negative [ ] nausea [ ] vomiting [ ] diarrhea [ ] constipation [ ] abd pain [ ] dysphagia   : [ ] negative [ ] dysuria [ ] nocturia [ ] hematuria [ ] increased urinary frequency  Musculoskeletal: [ ] negative [ ] back pain [ ] myalgias [ ] arthralgias [ ] fracture  Skin: [ ] negative [ ] rash [ ] itch  Neurological: [ ] negative [ ] headache [ ] dizziness [ ] syncope [ ] weakness [ ] numbness  Psychiatric: [ ] negative [ ] anxiety [ ] depression  Endocrine: [ ] negative [ ] diabetes [ ] thyroid problem  Hematologic/Lymphatic: [ ] negative [ ] anemia [ ] bleeding problem  Allergic/Immunologic: [ ] negative [ ] itchy eyes [ ] nasal discharge [ ] hives [ ] angioedema  [ ] All other systems negative  [ ] Unable to assess ROS because ________    OBJECTIVE:  ICU Vital Signs Last 24 Hrs  T(C): 36 (07 Jun 2019 10:05), Max: 36.8 (06 Jun 2019 12:05)  T(F): 96.8 (07 Jun 2019 10:05), Max: 98.3 (06 Jun 2019 12:05)  HR: 68 (07 Jun 2019 11:00) (65 - 69)  BP: 175/92 (07 Jun 2019 11:00) (137/68 - 197/77)  BP(mean): 114 (07 Jun 2019 10:45) (93 - 118)  ABP: --  ABP(mean): --  RR: 16 (07 Jun 2019 11:00) (14 - 18)  SpO2: 100% (07 Jun 2019 11:00) (97% - 100%)        06-06 @ 07:01  -  06-07 @ 07:00  --------------------------------------------------------  IN: 300 mL / OUT: 2000 mL / NET: -1700 mL      CAPILLARY BLOOD GLUCOSE      POCT Blood Glucose.: 180 mg/dL (07 Jun 2019 07:13)      PHYSICAL EXAM:  General:   HEENT:   Lymph Nodes:  Neck:   Respiratory:   Cardiovascular:   Abdomen:   Extremities:   Skin:   Neurological:  Psychiatry:    LINES:     HOSPITAL MEDICATIONS:  Standing Meds:  acetaminophen  IVPB .. 1000 milliGRAM(s) IV Intermittent once  aspirin enteric coated 81 milliGRAM(s) Oral daily  atorvastatin 40 milliGRAM(s) Oral at bedtime  calcium carbonate    500 mG (Tums) Chewable 2 Tablet(s) Chew three times a day  carvedilol 6.25 milliGRAM(s) Oral every 12 hours  chlorhexidine 4% Liquid 1 Application(s) Topical <User Schedule>  dextrose 5%. 1000 milliLiter(s) IV Continuous <Continuous>  dextrose 50% Injectable 12.5 Gram(s) IV Push once  dextrose 50% Injectable 25 Gram(s) IV Push once  dextrose 50% Injectable 25 Gram(s) IV Push once  furosemide    Tablet 40 milliGRAM(s) Oral two times a day  heparin  Injectable 5000 Unit(s) SubCutaneous every 8 hours  hydrALAZINE 75 milliGRAM(s) Oral three times a day  insulin lispro (HumaLOG) corrective regimen sliding scale   SubCutaneous three times a day before meals  insulin lispro (HumaLOG) corrective regimen sliding scale   SubCutaneous at bedtime  iron sucrose IVPB 200 milliGRAM(s) IV Intermittent every 24 hours  levothyroxine 50 MICROGram(s) Oral daily  losartan 50 milliGRAM(s) Oral daily  pantoprazole    Tablet 40 milliGRAM(s) Oral before breakfast  ticagrelor 90 milliGRAM(s) Oral two times a day      PRN Meds:  dextrose 40% Gel 15 Gram(s) Oral once PRN  fentaNYL    Injectable 25 MICROGram(s) IV Push every 5 minutes PRN  glucagon  Injectable 1 milliGRAM(s) IntraMuscular once PRN  ondansetron Injectable 4 milliGRAM(s) IV Push once PRN  sodium chloride 0.9% lock flush 10 milliLiter(s) IV Push every 1 hour PRN      LABS:                        8.1    7.58  )-----------( 233      ( 07 Jun 2019 08:44 )             25.2     Hgb Trend: 8.1<--, 7.5<--, 7.6<--, 8.7<--  06-07    135  |  95<L>  |  34<H>  ----------------------------<  193<H>  3.9   |  27  |  5.43<H>    Ca    7.4<L>      07 Jun 2019 06:11  Phos  4.0     06-06      Creatinine Trend: 5.43<--, 5.45<--, 8.89<--, 8.13<--  PT/INR - ( 06 Jun 2019 09:25 )   PT: 15.7 sec;   INR: 1.38 ratio                   MICROBIOLOGY:       RADIOLOGY:  [ ] Reviewed and interpreted by me    EKG: CHIEF COMPLAINT:  called for embolism    HPI:    ESRD not previously on HD, DMII on insulin, CAD, HTN,  Presented initially for cardiac catheterization as part of workup for possible renal transplant.     Post-cath has had oliguric renal failure requiring dialysis.     Now had tunneled dialysis catheter placed today. During procedure, reported desaturation, hypotension, decrease in EtCO2 which responded to ephedrine and intubation.   Patient extubated to room air. He is now asymptomatic and feels well.     PAST MEDICAL & SURGICAL HISTORY:  CRI (Chronic Renal Insufficiency)  CAD (Coronary Artery Disease): with Stents in 06/2009  CAD (Coronary Artery Disease)  Dyslipidemia  Diabetes  Hypertension  No significant past surgical history        HOME MEDICATIONS:  Home Medications:  aspirin 81 mg oral tablet: 1 tab(s) orally once a day (03 Jun 2019 09:50)  atorvastatin 40 mg oral tablet: 1 tab(s) orally once a day (03 Jun 2019 09:50)  ferrous sulfate 325 mg (65 mg elemental iron) oral tablet: 1 tab(s) orally once a day (03 Jun 2019 09:50)  hydrALAZINE 50 mg oral tablet: 1 tab(s) orally 3 times a day (03 Jun 2019 09:50)  Lasix 40 mg oral tablet: 1 tab(s) orally 2 times a day (03 Jun 2019 09:50)  Levemir FlexPen 100 units/mL subcutaneous solution: 8 unit(s) subcutaneous once a day (at bedtime) (03 Jun 2019 09:50)  levothyroxine 50 mcg (0.05 mg) oral tablet: 1 tab(s) orally once a day (03 Jun 2019 09:50)  losartan 25 mg oral tablet: 1 tab(s) orally once a day (03 Jun 2019 09:50)  NovoLOG FlexPen 100 units/mL injectable solution: 7 unit(s) injectable 2 times a day in the am and pm (03 Jun 2019 09:50)  NovoLOG FlexPen 100 units/mL injectable solution: 8 unit(s) injectable, at lunch (03 Jun 2019 09:50)  omeprazole 20 mg oral delayed release tablet: 1 tab(s) orally once a day (03 Jun 2019 09:50)  sodium bicarbonate 650 mg oral tablet: 1 tab(s) orally 3 times a day (03 Jun 2019 09:50)  Toprol-XL 50 mg oral tablet, extended release: 1 tab(s) orally once a day (03 Jun 2019 09:50)      REVIEW OF SYSTEMS:  Constitutional: [ X] negative [ ] fevers [ ] chills [ ] weight loss [ ] weight gain  HEENT: [X ] negative [ ] dry eyes [ ] eye irritation [ ] postnasal drip [ ] nasal congestion  CV: [X ] negative  [ ] chest pain [ ] orthopnea [ ] palpitations [ ] murmur  Resp: [X ] negative [ ] cough [ ] shortness of breath [ ] dyspnea [ ] wheezing [ ] sputum [ ] hemoptysis  GI: [ X] negative [ ] nausea [ ] vomiting [ ] diarrhea [ ] constipation [ ] abd pain [ ] dysphagia   : [ X] negative [ ] dysuria [ ] nocturia [ ] hematuria [ ] increased urinary frequency  Musculoskeletal: [ X] negative [ ] back pain [ ] myalgias [ ] arthralgias [ ] fracture  Skin: [ X] negative [ ] rash [ ] itch  Neurological: [ X] negative [ ] headache [ ] dizziness [ ] syncope [ ] weakness [ ] numbness  Psychiatric: [ ] negative [ ] anxiety [ ] depression  Endocrine: [ ] negative [ ] diabetes [ ] thyroid problem  Hematologic/Lymphatic: [ ] negative [ ] anemia [ ] bleeding problem  Allergic/Immunologic: [ ] negative [ ] itchy eyes [ ] nasal discharge [ ] hives [ ] angioedema  [X ] All other systems negative  [ ] Unable to assess ROS because ________    OBJECTIVE:  ICU Vital Signs Last 24 Hrs  T(C): 36 (07 Jun 2019 10:05), Max: 36.8 (06 Jun 2019 12:05)  T(F): 96.8 (07 Jun 2019 10:05), Max: 98.3 (06 Jun 2019 12:05)  HR: 68 (07 Jun 2019 11:00) (65 - 69)  BP: 175/92 (07 Jun 2019 11:00) (137/68 - 197/77)  BP(mean): 114 (07 Jun 2019 10:45) (93 - 118)  RR: 16 (07 Jun 2019 11:00) (14 - 18)  SpO2: 100% (07 Jun 2019 11:00) (97% - 100%)        06-06 @ 07:01  -  06-07 @ 07:00  --------------------------------------------------------  IN: 300 mL / OUT: 2000 mL / NET: -1700 mL      CAPILLARY BLOOD GLUCOSE      POCT Blood Glucose.: 180 mg/dL (07 Jun 2019 07:13)      PHYSICAL EXAM:  General: well appearing man laying flat in bed.NO distress  Respiratory: normal effort on room air,   normal breath sounds  Cardiovascular: rate regular    Ultrasound with normal function    Abdomen: soft, NT, ND  Neurological: awake alert, attentive, normal       LINES:     HOSPITAL MEDICATIONS:  Standing Meds:  acetaminophen  IVPB .. 1000 milliGRAM(s) IV Intermittent once  aspirin enteric coated 81 milliGRAM(s) Oral daily  atorvastatin 40 milliGRAM(s) Oral at bedtime  calcium carbonate    500 mG (Tums) Chewable 2 Tablet(s) Chew three times a day  carvedilol 6.25 milliGRAM(s) Oral every 12 hours  chlorhexidine 4% Liquid 1 Application(s) Topical <User Schedule>  dextrose 5%. 1000 milliLiter(s) IV Continuous <Continuous>  dextrose 50% Injectable 12.5 Gram(s) IV Push once  dextrose 50% Injectable 25 Gram(s) IV Push once  dextrose 50% Injectable 25 Gram(s) IV Push once  furosemide    Tablet 40 milliGRAM(s) Oral two times a day  heparin  Injectable 5000 Unit(s) SubCutaneous every 8 hours  hydrALAZINE 75 milliGRAM(s) Oral three times a day  insulin lispro (HumaLOG) corrective regimen sliding scale   SubCutaneous three times a day before meals  insulin lispro (HumaLOG) corrective regimen sliding scale   SubCutaneous at bedtime  iron sucrose IVPB 200 milliGRAM(s) IV Intermittent every 24 hours  levothyroxine 50 MICROGram(s) Oral daily  losartan 50 milliGRAM(s) Oral daily  pantoprazole    Tablet 40 milliGRAM(s) Oral before breakfast  ticagrelor 90 milliGRAM(s) Oral two times a day      PRN Meds:  dextrose 40% Gel 15 Gram(s) Oral once PRN  fentaNYL    Injectable 25 MICROGram(s) IV Push every 5 minutes PRN  glucagon  Injectable 1 milliGRAM(s) IntraMuscular once PRN  ondansetron Injectable 4 milliGRAM(s) IV Push once PRN  sodium chloride 0.9% lock flush 10 milliLiter(s) IV Push every 1 hour PRN      LABS:                        8.1    7.58  )-----------( 233      ( 07 Jun 2019 08:44 )             25.2     Hgb Trend: 8.1<--, 7.5<--, 7.6<--, 8.7<--  06-07    135  |  95<L>  |  34<H>  ----------------------------<  193<H>  3.9   |  27  |  5.43<H>    Ca    7.4<L>      07 Jun 2019 06:11  Phos  4.0     06-06      Creatinine Trend: 5.43<--, 5.45<--, 8.89<--, 8.13<--  PT/INR - ( 06 Jun 2019 09:25 )   PT: 15.7 sec;   INR: 1.38 ratio                   MICROBIOLOGY:       RADIOLOGY:  [ ] Reviewed and interpreted by me    EKG:

## 2019-06-07 NOTE — PROGRESS NOTE ADULT - SUBJECTIVE AND OBJECTIVE BOX
Interventional Radiology Procedure Note    Procedure:  Conversion of temporary to tunneled HD catheter    Indication:  ESRD on HD    Operators: Navya    Anesthesia (type):  IV sedation initially, then intubation and general    Contrast: none    EBL: 10cc    Findings/Follow up Plan of Care:  Successful conversion of temporary to tunneled HD catheter using fluoroscopic guidance.   Tip in SVC. PT desaturated and became hypotensive intraprocedurally likely secondary to air embolus.   Supportive care included fluid bolus, intubation.  Intraoperative echo by anesthesia performed with preliminary assessment demonstrating good right heart function and therefore extubated. To PACU.  Cardiology team contacted.  12 lead EKG pending.    Specimens Removed: none    Implants: 19 cm, 14 Nepali Hugo split catheter    Complications:  Likely air embolus with resolution of cardiovascular compromise    Condition/Disposition:  PACU for 2 hours.  Cardiology to assess pt.    Please call Interventional Radiology x [phone] with any questions, concerns, or issues.

## 2019-06-07 NOTE — PROGRESS NOTE ADULT - PROBLEM SELECTOR PLAN 1
- s/p shiley catheter placement by IR  - s/p HD today and C today s/p stent to LAD, no need for AVF per nephro as pt maybe able to proceed with renal transplant in .o  - IR consulted for tunneled catheter placement likely tomorrow  - continue sodium bicarbonate tablets TID (home medication).   - calcium carbonate supplements TID (per renal recs- for hypocalcemia)  - pt will need outpt HD initiated d/w CM, family to complete medicare application   - Case d/w Dr. Cardenas. - s/p tunnel catheter placement by IR today, w/ course complicated as above, now pt is doing well, and asymptomatic, HD angelica for today.   - no need for AVF per nephro as pt maybe able to proceed with renal transplant in .o  - continue sodium bicarbonate tablets TID (home medication).   - calcium carbonate supplements TID (per renal recs- for hypocalcemia)  - pt will need outpt HD initiated d/w CM, family to complete medicare application   - Case d/w Dr. Cardenas.

## 2019-06-07 NOTE — PROGRESS NOTE ADULT - PROBLEM SELECTOR PLAN 2
Hypotensive post tunneled catheter, but now hypertensive again.   - C/w UF with HD  - C/w losartan at 100mg/d  - C/w Coreg   - C/w lasix  - Monitor BPs

## 2019-06-07 NOTE — PROGRESS NOTE ADULT - PROBLEM SELECTOR PLAN 1
CKDV likely 2/2 DM and HTN, now ESRD. Started on HD 6/4. S/p IR-guided tunneled catheter placement on 6/7, complicated by hypotension. Now resolved.  - Planned for HD via tunneled catheter today.   - Then HD on MWF schedule.   - NO AVF needed at this time; had only 1 LAD stent so may just need to wait 3 months for transplant.   - Please have pt set up at [facility] HD center- f/u Case Management

## 2019-06-07 NOTE — CONSULT NOTE ADULT - ASSESSMENT
ASSESSMENT: Patient is a 63M w/ PMHx most notable for ESRD now admitted for cardiac cath prior to possible transplant will require hemodialysis in meantime so vascular surgery consulted for AVF placement    PLAN:    - Bilateral vein mapping, ordered  - Please protect left upper extremity, NO IV's, BP cuff or Blood draws  - Document medical clearance for AVF placement  - will follow for AVF placement likely this admission  - Plan discussed with vascular surgery fellow, Dr Martin, on behalf of Attending, Dr. Radha Mcguire PGY-2  Vascular Surgery team  pager [phone]
Assessment    63M with ESRD who desaturated and became hypotensive while having tunneled HD catheter placed. There was concern for air embolism from catheter manipulation.     Currently patient has normal vital signs on room air without any vasoactive agents.   Normal RV size and function without evidence of significant pulmonary hypertension.   May have had pulmonary embolism of air during procedure but has no signs/symptoms of such now.     Recs    Continue current care.      Disposition  Not accepted to MICU.  Will not follow after this consult.   Please reconsult if needed,
63yoM w/ CKDV, CAD s/p stent 2009, HTN, DMII, who was sent to the ER for LHC.

## 2019-06-07 NOTE — PROGRESS NOTE ADULT - SUBJECTIVE AND OBJECTIVE BOX
Interventional Radiology  Pre-Procedure Note    This is a 63y  Male      HPI:  This is a 62 yo male with ESRD not previously on HD (no fistula), CAD s/p stent 2009 and abnormal cath 9/2018 with 90% pRCA lesion (not intervened upon), HTN, DM type 2, and hypothyroidism, admitted to [facility] via the ED for cardiac cath. Pt is awaiting renal transplant and got repeat cardiac cath per his PMD and cardiologist prior to being cleared for transplant surgery.  Temporary HD catheter was placed 6/3/19 & last HD was 6/6. Pt presents to IR for conversion of temporary to tunneled HD cathter per Dr Torres's request. Pt a & o x3.       PAST MEDICAL & SURGICAL HISTORY:  CRI (Chronic Renal Insufficiency)  CAD (Coronary Artery Disease): with Stents in 06/2009  CAD (Coronary Artery Disease)  Dyslipidemia  Diabetes  Hypertension  No significant past surgical history      FAMILY HISTORY:  No pertinent family history in first degree relatives      Allergies: No Known Allergies      Current Medications: aspirin enteric coated 81 milliGRAM(s) Oral daily  atorvastatin 40 milliGRAM(s) Oral at bedtime  calcium carbonate    500 mG (Tums) Chewable 2 Tablet(s) Chew three times a day  carvedilol 6.25 milliGRAM(s) Oral every 12 hours  chlorhexidine 4% Liquid 1 Application(s) Topical <User Schedule>  dextrose 40% Gel 15 Gram(s) Oral once PRN  dextrose 5%. 1000 milliLiter(s) IV Continuous <Continuous>  dextrose 50% Injectable 12.5 Gram(s) IV Push once  dextrose 50% Injectable 25 Gram(s) IV Push once  dextrose 50% Injectable 25 Gram(s) IV Push once  furosemide    Tablet 40 milliGRAM(s) Oral two times a day  glucagon  Injectable 1 milliGRAM(s) IntraMuscular once PRN  hydrALAZINE 75 milliGRAM(s) Oral three times a day  insulin lispro (HumaLOG) corrective regimen sliding scale   SubCutaneous three times a day before meals  insulin lispro (HumaLOG) corrective regimen sliding scale   SubCutaneous at bedtime  iron sucrose IVPB 200 milliGRAM(s) IV Intermittent every 24 hours  levothyroxine 50 MICROGram(s) Oral daily  losartan 50 milliGRAM(s) Oral daily  pantoprazole    Tablet 40 milliGRAM(s) Oral before breakfast  sodium chloride 0.9% lock flush 10 milliLiter(s) IV Push every 1 hour PRN  ticagrelor 90 milliGRAM(s) Oral two times a day      Labs:                         7.5    6.16  )-----------( 203      ( 06 Jun 2019 09:20 )             23.8       06-07    135  |  95<L>  |  34<H>  ----------------------------<  193<H>  3.9   |  27  |  5.43<H>    Ca    7.4<L>      07 Jun 2019 06:11  Phos  4.0     06-06  Mg     2.3     06-04    TPro  x   /  Alb  x   /  TBili  x   /  DBili  x   /  AST  x   /  ALT  13  /  AlkPhos  x   06-03      PT/INR - ( 06 Jun 2019 09:25 )   PT: 15.7 sec;   INR: 1.38 ratio          Assessment/Plan:   This is a 63 year old Male  who presents with hx of ESRD requireing long term HD, on renal transplant list.  Patient presents to IR for conversion of temporary to tunneled HD catheter with anesthesia.  Procedure/ risks/ benefits/ goals/ alternatives were explained. All questions answered. Informed content obtained from patient. Consent placed in chart.     Thais COOK BC  ext [phone]  # [phone] Interventional Radiology  Pre-Procedure Note    This is a 63y  Male  for tunneled HD catheter.      HPI:  This is a 64 yo male with ESRD not previously on HD (no fistula), CAD s/p stent 2009 and abnormal cath 9/2018 with 90% pRCA lesion (not intervened upon), HTN, DM type 2, and hypothyroidism, admitted to [facility] via the ED for cardiac cath. Pt is awaiting renal transplant and got repeat cardiac cath per his PMD and cardiologist prior to being cleared for transplant surgery.  Temporary HD catheter was placed 6/3/19 & last HD was 6/6. Pt presents to IR for conversion of temporary to tunneled HD cathter per Dr Torres's request. Pt a & o x3.       PAST MEDICAL & SURGICAL HISTORY:  CRI (Chronic Renal Insufficiency)  CAD (Coronary Artery Disease): with Stents in 06/2009  CAD (Coronary Artery Disease)  Dyslipidemia  Diabetes  Hypertension  No significant past surgical history      FAMILY HISTORY:  No pertinent family history in first degree relatives      Allergies: No Known Allergies      Current Medications: aspirin enteric coated 81 milliGRAM(s) Oral daily  atorvastatin 40 milliGRAM(s) Oral at bedtime  calcium carbonate    500 mG (Tums) Chewable 2 Tablet(s) Chew three times a day  carvedilol 6.25 milliGRAM(s) Oral every 12 hours  chlorhexidine 4% Liquid 1 Application(s) Topical <User Schedule>  dextrose 40% Gel 15 Gram(s) Oral once PRN  dextrose 5%. 1000 milliLiter(s) IV Continuous <Continuous>  dextrose 50% Injectable 12.5 Gram(s) IV Push once  dextrose 50% Injectable 25 Gram(s) IV Push once  dextrose 50% Injectable 25 Gram(s) IV Push once  furosemide    Tablet 40 milliGRAM(s) Oral two times a day  glucagon  Injectable 1 milliGRAM(s) IntraMuscular once PRN  hydrALAZINE 75 milliGRAM(s) Oral three times a day  insulin lispro (HumaLOG) corrective regimen sliding scale   SubCutaneous three times a day before meals  insulin lispro (HumaLOG) corrective regimen sliding scale   SubCutaneous at bedtime  iron sucrose IVPB 200 milliGRAM(s) IV Intermittent every 24 hours  levothyroxine 50 MICROGram(s) Oral daily  losartan 50 milliGRAM(s) Oral daily  pantoprazole    Tablet 40 milliGRAM(s) Oral before breakfast  sodium chloride 0.9% lock flush 10 milliLiter(s) IV Push every 1 hour PRN  ticagrelor 90 milliGRAM(s) Oral two times a day      Labs:                         7.5    6.16  )-----------( 203      ( 06 Jun 2019 09:20 )             23.8       06-07    135  |  95<L>  |  34<H>  ----------------------------<  193<H>  3.9   |  27  |  5.43<H>    Ca    7.4<L>      07 Jun 2019 06:11  Phos  4.0     06-06  Mg     2.3     06-04    TPro  x   /  Alb  x   /  TBili  x   /  DBili  x   /  AST  x   /  ALT  13  /  AlkPhos  x   06-03      PT/INR - ( 06 Jun 2019 09:25 )   PT: 15.7 sec;   INR: 1.38 ratio          Assessment/Plan:   This is a 63 year old Male  who presents with hx of ESRD requireing long term HD, on renal transplant list.  Patient presents to IR for conversion of temporary to tunneled HD catheter with anesthesia.  Procedure/ risks/ benefits/ goals/ alternatives were explained. All questions answered. Informed content obtained from patient. Consent placed in chart.     Thais COOK BC  ext [phone]  # [phone]

## 2019-06-07 NOTE — PROGRESS NOTE ADULT - SUBJECTIVE AND OBJECTIVE BOX
[facility]. During case, he suffered a venous air embolism (seen on fluoroscopy) with sudden drop in CO2 and hypoxia. He was initially intubated and transferred to PACU. However, he recovered quickly, was extubated and doing well. MICU saw patient and did not deem him candidate for the unit.     On my evaluation, patient was extubated, on room air. He denied any CP, SOB, palp, dizziness.       MEDICATIONS:  aspirin enteric coated 81 milliGRAM(s) Oral daily  carvedilol 6.25 milliGRAM(s) Oral every 12 hours  furosemide    Tablet 40 milliGRAM(s) Oral two times a day  heparin  Injectable 5000 Unit(s) SubCutaneous every 8 hours  hydrALAZINE 75 milliGRAM(s) Oral three times a day  losartan 50 milliGRAM(s) Oral daily  ticagrelor 90 milliGRAM(s) Oral two times a dayacetaminophen  IVPB .. 1000 milliGRAM(s) IV Intermittent once  fentaNYL    Injectable 25 MICROGram(s) IV Push every 5 minutes PRN  ondansetron Injectable 4 milliGRAM(s) IV Push once PRN    calcium carbonate    500 mG (Tums) Chewable 2 Tablet(s) Chew three times a day  pantoprazole    Tablet 40 milliGRAM(s) Oral before breakfast    atorvastatin 40 milliGRAM(s) Oral at bedtime  dextrose 40% Gel 15 Gram(s) Oral once PRN  dextrose 50% Injectable 12.5 Gram(s) IV Push once  dextrose 50% Injectable 25 Gram(s) IV Push once  dextrose 50% Injectable 25 Gram(s) IV Push once  glucagon  Injectable 1 milliGRAM(s) IntraMuscular once PRN  insulin lispro (HumaLOG) corrective regimen sliding scale   SubCutaneous three times a day before meals  insulin lispro (HumaLOG) corrective regimen sliding scale   SubCutaneous at bedtime  levothyroxine 50 MICROGram(s) Oral daily    chlorhexidine 4% Liquid 1 Application(s) Topical <User Schedule>  dextrose 5%. 1000 milliLiter(s) IV Continuous <Continuous>  iron sucrose IVPB 200 milliGRAM(s) IV Intermittent every 24 hours  sodium chloride 0.9% lock flush 10 milliLiter(s) IV Push every 1 hour PRN      REVIEW OF SYSTEMS:  CONSTITUTIONAL: No weakness, fevers or chills  EYES/ENT: No visual changes;  No dysphagia  RESPIRATORY: No cough, wheezing, hemoptysis; No shortness of breath  CARDIOVASCULAR: No chest pain or palpitations; No lower extremity edema  GASTROINTESTINAL: No abdominal or epigastric pain. No nausea, vomiting, or hematemesis  GENITOURINARY: No dysuria, frequency or hematuria  NEUROLOGICAL: No numbness or weakness  SKIN: No itching, burning, rashes, or lesions   HEME: No bleeding or bruising  MSK: No joint pains or muscle pains  All other review of systems is negative unless indicated above.    PHYSICAL EXAM:  T(C): 36 (06-07-19 @ 10:05), Max: 36.8 (06-06-19 @ 21:45)  HR: 71 (06-07-19 @ 12:00) (65 - 71)  BP: 189/90 (06-07-19 @ 12:00) (137/68 - 197/91)  RR: 14 (06-07-19 @ 12:15) (14 - 18)  SpO2: 100% (06-07-19 @ 12:00) (97% - 100%)  Wt(kg): --  I&O's Summary    06 Jun 2019 07:01  -  07 Jun 2019 07:00  --------------------------------------------------------  IN: 300 mL / OUT: 2000 mL / NET: -1700 mL        Appearance: Normal	  HEENT:   Normal oral mucosa  Cardiovascular: Normal S1 S2, No JVD, No murmurs, No edema  Chest wall: R permcath in place, no tenderness to palpation  Respiratory: Lungs clear to auscultation	  Psychiatry: A & O x 3, Mood & affect appropriate  Gastrointestinal:  Soft, Non-tender, + BS	  Skin: No rashes, No ecchymoses, No cyanosis	  Neurologic: Non-focal  Extremities: Normal range of motion, No clubbing, cyanosis        LABS:	 	    CBC Full  -  ( 07 Jun 2019 08:44 )  WBC Count : 7.58 K/uL  Hemoglobin : 8.1 g/dL  Hematocrit : 25.2 %  Platelet Count - Automated : 233 K/uL  Mean Cell Volume : 87.8 fl  Mean Cell Hemoglobin : 28.2 pg  Mean Cell Hemoglobin Concentration : 32.1 gm/dL  Auto Neutrophil # : x  Auto Lymphocyte # : x  Auto Monocyte # : x  Auto Eosinophil # : x  Auto Basophil # : x  Auto Neutrophil % : x  Auto Lymphocyte % : x  Auto Monocyte % : x  Auto Eosinophil % : x  Auto Basophil % : x    06-07    135  |  95<L>  |  34<H>  ----------------------------<  193<H>  3.9   |  27  |  5.43<H>  06-06    139  |  97  |  41<H>  ----------------------------<  64<L>  3.5   |  26  |  5.45<H>    Ca    7.4<L>      07 Jun 2019 06:11  Ca    7.0<L>      06 Jun 2019 06:00  Phos  4.0     06-06    ECG:  	NSR, no ischemic changes	    Post op DINAH: Showed normal RV function, no wall motion abnormalities, limited study    ASSESSMENT/PLAN: 	  62 yo male with ESRD not previously on HD (no fistula), CAD s/p stent 2009 and abnormal cath 9/2018, HTN, DMII, hypothyroidism, presents to the ED for cardiac cath for renal transplant evaluation. S/p PCI to LAD. Now s/p permcath placement 6/7/19 c/b venous air embolism.     Extubated, satting well on RA with no respiratory complaints. EKG w/o ischemia. Currently stable for transfer to floor.    CAD  - s/p PCI to LAD on 6/5/19  - c/w asa, brilinta, statin, BB, ARB    HTN  - BP elevated  - increase coreg to 12.5mg BID  - c/w losartan, hydralazine    ESRD on HD  - s/p permcath placement  - c/w dialysis  - would not hold antiplatelet for AVF placement      Laurie Colon MD  Cardiology Fellow, M-F 7:30A-5P  [phone]    All Cardiology service information can be found on [email or web address], password: [email or web address].

## 2019-06-07 NOTE — PROGRESS NOTE ADULT - SUBJECTIVE AND OBJECTIVE BOX
[facility] DIVISION OF KIDNEY DISEASES AND HYPERTENSION -- FOLLOW UP NOTE  --------------------------------------------------------------------------------  Chief Complaint:  ESRD    24 hour events/subjective:  Hypotensive with tunneled catheter placement- concern for air embolism >Evaluated by MICU. Stat ECHO done- no sign of any RV strain. BPs improved. Pt now feeling well.       PAST HISTORY  --------------------------------------------------------------------------------  No significant changes to PMH, PSH, FHx, SHx, unless otherwise noted    ALLERGIES & MEDICATIONS  --------------------------------------------------------------------------------  Allergies    No Known Allergies    Intolerances      Standing Inpatient Medications  aspirin enteric coated 81 milliGRAM(s) Oral daily  atorvastatin 40 milliGRAM(s) Oral at bedtime  calcium carbonate    500 mG (Tums) Chewable 2 Tablet(s) Chew three times a day  carvedilol 6.25 milliGRAM(s) Oral every 12 hours  chlorhexidine 4% Liquid 1 Application(s) Topical <User Schedule>  dextrose 5%. 1000 milliLiter(s) IV Continuous <Continuous>  dextrose 50% Injectable 12.5 Gram(s) IV Push once  dextrose 50% Injectable 25 Gram(s) IV Push once  dextrose 50% Injectable 25 Gram(s) IV Push once  furosemide    Tablet 40 milliGRAM(s) Oral two times a day  heparin  Injectable 5000 Unit(s) SubCutaneous every 8 hours  hydrALAZINE 75 milliGRAM(s) Oral three times a day  insulin lispro (HumaLOG) corrective regimen sliding scale   SubCutaneous three times a day before meals  insulin lispro (HumaLOG) corrective regimen sliding scale   SubCutaneous at bedtime  iron sucrose IVPB 200 milliGRAM(s) IV Intermittent every 24 hours  levothyroxine 50 MICROGram(s) Oral daily  losartan 100 milliGRAM(s) Oral daily  pantoprazole    Tablet 40 milliGRAM(s) Oral before breakfast  ticagrelor 90 milliGRAM(s) Oral two times a day    PRN Inpatient Medications  dextrose 40% Gel 15 Gram(s) Oral once PRN  glucagon  Injectable 1 milliGRAM(s) IntraMuscular once PRN  sodium chloride 0.9% lock flush 10 milliLiter(s) IV Push every 1 hour PRN      REVIEW OF SYSTEMS  --------------------------------------------------------------------------------  Gen: no fatigue  Respiratory: No dyspnea, cough  CV: No chest pain, orthopnea  GI: No abdominal pain, diarrhea, constipation, nausea, vomiting  MSK: No joint pain/swelling; no edema  Neuro: no confusion    VITALS/PHYSICAL EXAM  --------------------------------------------------------------------------------  T(C): 36.2 (06-07-19 @ 12:30), Max: 36.8 (06-06-19 @ 21:45)  HR: 73 (06-07-19 @ 12:30) (65 - 74)  BP: 187/92 (06-07-19 @ 12:30) (137/68 - 197/91)  RR: 16 (06-07-19 @ 12:30) (14 - 18)  SpO2: 100% (06-07-19 @ 12:30) (97% - 100%)  Wt(kg): --  Height (cm): 177.8 (06-07-19 @ 08:27)  Weight (kg): 74.8 (06-07-19 @ 08:27)  BMI (kg/m2): 23.7 (06-07-19 @ 08:27)  BSA (m2): 1.92 (06-07-19 @ 08:27)      06-06-19 @ 07:01  -  06-07-19 @ 07:00  --------------------------------------------------------  IN: 300 mL / OUT: 2000 mL / NET: -1700 mL      Physical Exam:  	Gen: NAD  	Pulm: CTA B/L  	CV: RRR, S1S2; no rub  	Abd: +BS, soft, nontender/nondistended  	LE: Warm, no edema  	Neuro: follows commands  	Psych: Normal affect and mood  	Skin: Warm, without rashes  	Vascular access: +RIJ tunneled HD catheter    LABS/STUDIES  --------------------------------------------------------------------------------              8.1    7.58  >-----------<  233      [06-07-19 @ 08:44]              25.2     135  |  95  |  34  ----------------------------<  193      [06-07-19 @ 06:11]  3.9   |  27  |  5.43        Ca     7.4     [06-07-19 @ 06:11]      Phos  4.0     [06-06-19 @ 06:00]      PT/INR: PT 15.7 , INR 1.38       [06-06-19 @ 09:25]      Creatinine Trend:  SCr 5.43 [06-07 @ 06:11]  SCr 5.45 [06-06 @ 06:00]  SCr 8.89 [06-04 @ 05:32]  SCr 8.13 [06-03 @ 07:53]        Iron 26, TIBC 205, %sat 13      [06-04-19 @ 09:27]  Ferritin 99      [06-04-19 @ 09:33]  PTH -- (Ca 6.9)      [06-05-19 @ 08:14]   562  HbA1c 5.8      [06-06-19 @ 09:20]    HBsAb >1000.0      [06-03-19 @ 15:57]  HBsAg Nonreact      [06-03-19 @ 15:57]  HBcAb Reactive      [06-03-19 @ 15:57]  HCV 0.13, Nonreact      [06-03-19 @ 15:57]

## 2019-06-07 NOTE — CHART NOTE - NSCHARTNOTEFT_GEN_A_CORE
:  Curtis Grider    INDICATION:    Evaluation for pulmonary embolus    PROCEDURE:  [X ] LIMITED ECHO  [X ] LIMITED CHEST      FINDINGS:    Normal LV systolic function  Normal RV systolic function  Normal ventricular chamber size  Minimal TR with estimated RVSP 37mmHg  Pulmonary artery acceleration time of 160ms    Anterior A line aeration pattern    INTERPRETATION:    Normal cardiac function  Normal pulmonary pressures  No sign of RV strain or dysfunction

## 2019-06-07 NOTE — PROGRESS NOTE ADULT - SUBJECTIVE AND OBJECTIVE BOX
Patient was undergoing a procedure in IR under moderate sedation, suffered a venous air embolism. Air witnessed in RVOT on fluoroscopy; confirmatory signs included sudden drop in ETCO2 and hypoxia. Patient was intubated, ventilated with 100% O2, given one dose of ephedrine 25 mg and appropriate measures were taken on the field. He recovered rapidly. The procedure was completed under dense muscle relaxation and positive pressure ventilation. A postoperative DINAH showed normal RV function. He was extubated and is faring well in recovery. A 12-lead EKG is pending to rule out any ongoing ischemia (no wall motion abnormalities were seen on echo, but it was not a thorough examination as it was performed in an emergency fashion.)    HAO LOPEZ MD

## 2019-06-07 NOTE — PROGRESS NOTE ADULT - SUBJECTIVE AND OBJECTIVE BOX
Patient is a 63y old  Male who presents with a chief complaint of needs cardiac cath and new HD (07 Jun 2019 14:06)      SUBJECTIVE / OVERNIGHT EVENTS:    ROS:  All other review of systems negative    Allergies    No Known Allergies    Intolerances        MEDICATIONS  (STANDING):  aspirin enteric coated 81 milliGRAM(s) Oral daily  calcium carbonate    500 mG (Tums) Chewable 2 Tablet(s) Chew three times a day  carvedilol 6.25 milliGRAM(s) Oral every 12 hours  chlorhexidine 4% Liquid 1 Application(s) Topical <User Schedule>  dextrose 5%. 1000 milliLiter(s) (50 mL/Hr) IV Continuous <Continuous>  dextrose 50% Injectable 12.5 Gram(s) IV Push once  dextrose 50% Injectable 25 Gram(s) IV Push once  dextrose 50% Injectable 25 Gram(s) IV Push once  docusate sodium 100 milliGRAM(s) Oral three times a day  doxercalciferol Injectable 1 MICROGram(s) IV Push <User Schedule>  furosemide    Tablet 40 milliGRAM(s) Oral two times a day  heparin  Injectable 5000 Unit(s) SubCutaneous every 8 hours  hydrALAZINE 75 milliGRAM(s) Oral three times a day  insulin glargine Injectable (LANTUS) 5 Unit(s) SubCutaneous at bedtime  insulin lispro (HumaLOG) corrective regimen sliding scale   SubCutaneous three times a day before meals  insulin lispro (HumaLOG) corrective regimen sliding scale   SubCutaneous at bedtime  iron sucrose IVPB 200 milliGRAM(s) IV Intermittent every 24 hours  levothyroxine 50 MICROGram(s) Oral daily  losartan 50 milliGRAM(s) Oral daily  pantoprazole    Tablet 40 milliGRAM(s) Oral before breakfast  rosuvastatin 40 milliGRAM(s) Oral at bedtime  senna 2 Tablet(s) Oral at bedtime  ticagrelor 90 milliGRAM(s) Oral two times a day    MEDICATIONS  (PRN):  dextrose 40% Gel 15 Gram(s) Oral once PRN Blood Glucose LESS THAN 70 milliGRAM(s)/deciliter  glucagon  Injectable 1 milliGRAM(s) IntraMuscular once PRN Glucose LESS THAN 70 milligrams/deciliter  sodium chloride 0.9% lock flush 10 milliLiter(s) IV Push every 1 hour PRN Pre/post blood products, medications, blood draw, and to maintain line patency      Vital Signs Last 24 Hrs  T(C): 36.2 (07 Jun 2019 12:30), Max: 36.8 (06 Jun 2019 21:45)  T(F): 97.2 (07 Jun 2019 12:30), Max: 98.3 (06 Jun 2019 21:45)  HR: 73 (07 Jun 2019 12:30) (65 - 74)  BP: 187/92 (07 Jun 2019 12:30) (137/68 - 197/91)  BP(mean): 130 (07 Jun 2019 11:30) (93 - 130)  RR: 16 (07 Jun 2019 12:30) (14 - 18)  SpO2: 100% (07 Jun 2019 12:30) (97% - 100%)  CAPILLARY BLOOD GLUCOSE      POCT Blood Glucose.: 171 mg/dL (07 Jun 2019 11:12)  POCT Blood Glucose.: 180 mg/dL (07 Jun 2019 07:13)  POCT Blood Glucose.: 178 mg/dL (07 Jun 2019 06:07)  POCT Blood Glucose.: 227 mg/dL (07 Jun 2019 00:27)  POCT Blood Glucose.: 229 mg/dL (06 Jun 2019 21:35)  POCT Blood Glucose.: 278 mg/dL (06 Jun 2019 17:28)    I&O's Summary    06 Jun 2019 07:01  -  07 Jun 2019 07:00  --------------------------------------------------------  IN: 300 mL / OUT: 2000 mL / NET: -1700 mL        PHYSICAL EXAM:  GENERAL: NAD, well-developed  HEAD:  Atraumatic, Normocephalic  EYES: EOMI, PERRLA, conjunctiva and sclera clear  NECK: Supple, No JVD  CHEST/LUNG: Clear to auscultation bilaterally; No wheeze  HEART: Regular rate and rhythm; No murmurs, rubs, or gallops  ABDOMEN: Soft, Nontender, Nondistended; Bowel sounds present  EXTREMITIES:  2+ Peripheral Pulses, No clubbing, cyanosis, or edema  NEUROLOGY: AAOx3, non-focal  PSYCH: calm  SKIN: No rashes or lesions    LABS:                        8.1    7.58  )-----------( 233      ( 07 Jun 2019 08:44 )             25.2     06-07    135  |  95<L>  |  34<H>  ----------------------------<  193<H>  3.9   |  27  |  5.43<H>    Ca    7.4<L>      07 Jun 2019 06:11  Phos  4.0     06-06      PT/INR - ( 06 Jun 2019 09:25 )   PT: 15.7 sec;   INR: 1.38 ratio                   RADIOLOGY & ADDITIONAL TESTS:    Imaging Personally Reviewed:    Consultant(s) Notes Reviewed:      Care Discussed with Consultants/Other Providers:    Case Discussed with Family:    Goals of Care: Patient is a 63y old  Male who presents with a chief complaint of needs cardiac cath and new HD (07 Jun 2019 14:06)      SUBJECTIVE / OVERNIGHT EVENTS: Patient seen and examined at bedside. States that he is feeling well. He has tunnel cath placed by IR this morning, which was complicated by a venous air embolism (seen on fluoroscopy) with sudden drop in BP and hypoxia. He was subsequently intubated and transferred to PACU. where he recovered quickly, was extubated, MICU evaluated the pt, deemed stable to go to tele floor. Denies CP, SOB, abd pain and n/v.    ROS:  All other review of systems negative    Allergies    No Known Allergies    Intolerances        MEDICATIONS  (STANDING):  aspirin enteric coated 81 milliGRAM(s) Oral daily  calcium carbonate    500 mG (Tums) Chewable 2 Tablet(s) Chew three times a day  carvedilol 6.25 milliGRAM(s) Oral every 12 hours  chlorhexidine 4% Liquid 1 Application(s) Topical <User Schedule>  dextrose 5%. 1000 milliLiter(s) (50 mL/Hr) IV Continuous <Continuous>  dextrose 50% Injectable 12.5 Gram(s) IV Push once  dextrose 50% Injectable 25 Gram(s) IV Push once  dextrose 50% Injectable 25 Gram(s) IV Push once  docusate sodium 100 milliGRAM(s) Oral three times a day  doxercalciferol Injectable 1 MICROGram(s) IV Push <User Schedule>  furosemide    Tablet 40 milliGRAM(s) Oral two times a day  heparin  Injectable 5000 Unit(s) SubCutaneous every 8 hours  hydrALAZINE 75 milliGRAM(s) Oral three times a day  insulin glargine Injectable (LANTUS) 5 Unit(s) SubCutaneous at bedtime  insulin lispro (HumaLOG) corrective regimen sliding scale   SubCutaneous three times a day before meals  insulin lispro (HumaLOG) corrective regimen sliding scale   SubCutaneous at bedtime  iron sucrose IVPB 200 milliGRAM(s) IV Intermittent every 24 hours  levothyroxine 50 MICROGram(s) Oral daily  losartan 50 milliGRAM(s) Oral daily  pantoprazole    Tablet 40 milliGRAM(s) Oral before breakfast  rosuvastatin 40 milliGRAM(s) Oral at bedtime  senna 2 Tablet(s) Oral at bedtime  ticagrelor 90 milliGRAM(s) Oral two times a day    MEDICATIONS  (PRN):  dextrose 40% Gel 15 Gram(s) Oral once PRN Blood Glucose LESS THAN 70 milliGRAM(s)/deciliter  glucagon  Injectable 1 milliGRAM(s) IntraMuscular once PRN Glucose LESS THAN 70 milligrams/deciliter  sodium chloride 0.9% lock flush 10 milliLiter(s) IV Push every 1 hour PRN Pre/post blood products, medications, blood draw, and to maintain line patency      Vital Signs Last 24 Hrs  T(C): 36.2 (07 Jun 2019 12:30), Max: 36.8 (06 Jun 2019 21:45)  T(F): 97.2 (07 Jun 2019 12:30), Max: 98.3 (06 Jun 2019 21:45)  HR: 73 (07 Jun 2019 12:30) (65 - 74)  BP: 187/92 (07 Jun 2019 12:30) (137/68 - 197/91)  BP(mean): 130 (07 Jun 2019 11:30) (93 - 130)  RR: 16 (07 Jun 2019 12:30) (14 - 18)  SpO2: 100% (07 Jun 2019 12:30) (97% - 100%)  CAPILLARY BLOOD GLUCOSE      POCT Blood Glucose.: 171 mg/dL (07 Jun 2019 11:12)  POCT Blood Glucose.: 180 mg/dL (07 Jun 2019 07:13)  POCT Blood Glucose.: 178 mg/dL (07 Jun 2019 06:07)  POCT Blood Glucose.: 227 mg/dL (07 Jun 2019 00:27)  POCT Blood Glucose.: 229 mg/dL (06 Jun 2019 21:35)  POCT Blood Glucose.: 278 mg/dL (06 Jun 2019 17:28)    I&O's Summary    06 Jun 2019 07:01  -  07 Jun 2019 07:00  --------------------------------------------------------  IN: 300 mL / OUT: 2000 mL / NET: -1700 mL        PHYSICAL EXAM:  GENERAL: NAD, well-developed  HEAD:  Atraumatic, Normocephalic  EYES: EOMI, PERRLA, conjunctiva and sclera clear  NECK: Supple, No JVD  CHEST/LUNG: Clear to auscultation bilaterally; No wheeze, right tunnel cath   HEART: Regular rate and rhythm; No murmurs, rubs, or gallops  ABDOMEN: Soft, Nontender, Nondistended; Bowel sounds present  EXTREMITIES:  2+ Peripheral Pulses, No clubbing, cyanosis, or edema  NEUROLOGY: AAOx3, non-focal      LABS:                        8.1    7.58  )-----------( 233      ( 07 Jun 2019 08:44 )             25.2     06-07    135  |  95<L>  |  34<H>  ----------------------------<  193<H>  3.9   |  27  |  5.43<H>    Ca    7.4<L>      07 Jun 2019 06:11  Phos  4.0     06-06      PT/INR - ( 06 Jun 2019 09:25 )   PT: 15.7 sec;   INR: 1.38 ratio                   RADIOLOGY & ADDITIONAL TESTS:    Consultant(s) Notes Reviewed:  Cardiology, IR, MICU and Nephrology rec noted     Care Discussed with Consultants/Other Providers: Dr. Cardenas and Medicine NP     Case Discussed with son at bedside

## 2019-06-08 ENCOUNTER — MOBILE ON CALL (OUTPATIENT)
Age: 64
End: 2019-06-08

## 2019-06-08 LAB
ANION GAP SERPL CALC-SCNC: 14 MMOL/L — SIGNIFICANT CHANGE UP (ref 5–17)
BUN SERPL-MCNC: 23 MG/DL — SIGNIFICANT CHANGE UP (ref 7–23)
CALCIUM SERPL-MCNC: 8.1 MG/DL — LOW (ref 8.4–10.5)
CHLORIDE SERPL-SCNC: 97 MMOL/L — SIGNIFICANT CHANGE UP (ref 96–108)
CO2 SERPL-SCNC: 26 MMOL/L — SIGNIFICANT CHANGE UP (ref 22–31)
CREAT SERPL-MCNC: 4.37 MG/DL — HIGH (ref 0.5–1.3)
GLUCOSE BLDC GLUCOMTR-MCNC: 169 MG/DL — HIGH (ref 70–99)
GLUCOSE BLDC GLUCOMTR-MCNC: 235 MG/DL — HIGH (ref 70–99)
GLUCOSE BLDC GLUCOMTR-MCNC: 248 MG/DL — HIGH (ref 70–99)
GLUCOSE BLDC GLUCOMTR-MCNC: 253 MG/DL — HIGH (ref 70–99)
GLUCOSE SERPL-MCNC: 287 MG/DL — HIGH (ref 70–99)
HCT VFR BLD CALC: 26 % — LOW (ref 39–50)
HGB BLD-MCNC: 8 G/DL — LOW (ref 13–17)
MAGNESIUM SERPL-MCNC: 1.9 MG/DL — SIGNIFICANT CHANGE UP (ref 1.6–2.6)
MCHC RBC-ENTMCNC: 28.2 PG — SIGNIFICANT CHANGE UP (ref 27–34)
MCHC RBC-ENTMCNC: 30.8 GM/DL — LOW (ref 32–36)
MCV RBC AUTO: 91.5 FL — SIGNIFICANT CHANGE UP (ref 80–100)
PHOSPHATE SERPL-MCNC: 2.7 MG/DL — SIGNIFICANT CHANGE UP (ref 2.5–4.5)
PLATELET # BLD AUTO: 223 K/UL — SIGNIFICANT CHANGE UP (ref 150–400)
POTASSIUM SERPL-MCNC: 4.1 MMOL/L — SIGNIFICANT CHANGE UP (ref 3.5–5.3)
POTASSIUM SERPL-SCNC: 4.1 MMOL/L — SIGNIFICANT CHANGE UP (ref 3.5–5.3)
RBC # BLD: 2.84 M/UL — LOW (ref 4.2–5.8)
RBC # FLD: 13.2 % — SIGNIFICANT CHANGE UP (ref 10.3–14.5)
SODIUM SERPL-SCNC: 137 MMOL/L — SIGNIFICANT CHANGE UP (ref 135–145)
WBC # BLD: 8.37 K/UL — SIGNIFICANT CHANGE UP (ref 3.8–10.5)
WBC # FLD AUTO: 8.37 K/UL — SIGNIFICANT CHANGE UP (ref 3.8–10.5)

## 2019-06-08 PROCEDURE — 99233 SBSQ HOSP IP/OBS HIGH 50: CPT

## 2019-06-08 PROCEDURE — 93010 ELECTROCARDIOGRAM REPORT: CPT

## 2019-06-08 RX ORDER — INSULIN LISPRO 100/ML
3 VIAL (ML) SUBCUTANEOUS
Refills: 0 | Status: DISCONTINUED | OUTPATIENT
Start: 2019-06-08 | End: 2019-06-11

## 2019-06-08 RX ORDER — HYDRALAZINE HCL 50 MG
100 TABLET ORAL EVERY 8 HOURS
Refills: 0 | Status: DISCONTINUED | OUTPATIENT
Start: 2019-06-08 | End: 2019-06-10

## 2019-06-08 RX ADMIN — SENNA PLUS 2 TABLET(S): 8.6 TABLET ORAL at 21:57

## 2019-06-08 RX ADMIN — Medication 75 MILLIGRAM(S): at 13:19

## 2019-06-08 RX ADMIN — CHLORHEXIDINE GLUCONATE 1 APPLICATION(S): 213 SOLUTION TOPICAL at 05:15

## 2019-06-08 RX ADMIN — CARVEDILOL PHOSPHATE 6.25 MILLIGRAM(S): 80 CAPSULE, EXTENDED RELEASE ORAL at 21:58

## 2019-06-08 RX ADMIN — Medication 2: at 18:03

## 2019-06-08 RX ADMIN — HEPARIN SODIUM 5000 UNIT(S): 5000 INJECTION INTRAVENOUS; SUBCUTANEOUS at 21:58

## 2019-06-08 RX ADMIN — Medication 100 MILLIGRAM(S): at 13:20

## 2019-06-08 RX ADMIN — Medication 3 UNIT(S): at 18:02

## 2019-06-08 RX ADMIN — CARVEDILOL PHOSPHATE 6.25 MILLIGRAM(S): 80 CAPSULE, EXTENDED RELEASE ORAL at 11:52

## 2019-06-08 RX ADMIN — INSULIN GLARGINE 5 UNIT(S): 100 INJECTION, SOLUTION SUBCUTANEOUS at 21:58

## 2019-06-08 RX ADMIN — TICAGRELOR 90 MILLIGRAM(S): 90 TABLET ORAL at 18:01

## 2019-06-08 RX ADMIN — Medication 100 MILLIGRAM(S): at 05:14

## 2019-06-08 RX ADMIN — Medication 81 MILLIGRAM(S): at 11:52

## 2019-06-08 RX ADMIN — PANTOPRAZOLE SODIUM 40 MILLIGRAM(S): 20 TABLET, DELAYED RELEASE ORAL at 05:14

## 2019-06-08 RX ADMIN — Medication 40 MILLIGRAM(S): at 18:01

## 2019-06-08 RX ADMIN — Medication 2: at 08:10

## 2019-06-08 RX ADMIN — Medication 2 TABLET(S): at 13:20

## 2019-06-08 RX ADMIN — ROSUVASTATIN CALCIUM 40 MILLIGRAM(S): 5 TABLET ORAL at 21:57

## 2019-06-08 RX ADMIN — HEPARIN SODIUM 5000 UNIT(S): 5000 INJECTION INTRAVENOUS; SUBCUTANEOUS at 05:13

## 2019-06-08 RX ADMIN — HEPARIN SODIUM 5000 UNIT(S): 5000 INJECTION INTRAVENOUS; SUBCUTANEOUS at 13:19

## 2019-06-08 RX ADMIN — Medication 100 MILLIGRAM(S): at 21:58

## 2019-06-08 RX ADMIN — Medication 100 MILLIGRAM(S): at 21:59

## 2019-06-08 RX ADMIN — Medication 50 MICROGRAM(S): at 05:14

## 2019-06-08 RX ADMIN — Medication 2 TABLET(S): at 21:57

## 2019-06-08 RX ADMIN — TICAGRELOR 90 MILLIGRAM(S): 90 TABLET ORAL at 05:14

## 2019-06-08 RX ADMIN — IRON SUCROSE 110 MILLIGRAM(S): 20 INJECTION, SOLUTION INTRAVENOUS at 18:01

## 2019-06-08 RX ADMIN — Medication 40 MILLIGRAM(S): at 05:14

## 2019-06-08 RX ADMIN — Medication 2 TABLET(S): at 05:13

## 2019-06-08 RX ADMIN — LOSARTAN POTASSIUM 50 MILLIGRAM(S): 100 TABLET, FILM COATED ORAL at 05:14

## 2019-06-08 RX ADMIN — Medication 3: at 11:53

## 2019-06-08 RX ADMIN — Medication 75 MILLIGRAM(S): at 05:14

## 2019-06-08 NOTE — PROGRESS NOTE ADULT - SUBJECTIVE AND OBJECTIVE BOX
Patient is a 63y old  Male who presents with a chief complaint of needs cardiac cath and new HD (07 Jun 2019 15:28)      INTERVAL HPI/OVERNIGHT EVENTS:    No fever , no diarrhea       Medications:MEDICATIONS  (STANDING):  aspirin enteric coated 81 milliGRAM(s) Oral daily  calcium carbonate    500 mG (Tums) Chewable 2 Tablet(s) Chew three times a day  carvedilol 6.25 milliGRAM(s) Oral every 12 hours  chlorhexidine 4% Liquid 1 Application(s) Topical <User Schedule>  dextrose 5%. 1000 milliLiter(s) (50 mL/Hr) IV Continuous <Continuous>  dextrose 50% Injectable 12.5 Gram(s) IV Push once  dextrose 50% Injectable 25 Gram(s) IV Push once  dextrose 50% Injectable 25 Gram(s) IV Push once  docusate sodium 100 milliGRAM(s) Oral three times a day  doxercalciferol Injectable 1 MICROGram(s) IV Push <User Schedule>  furosemide    Tablet 40 milliGRAM(s) Oral two times a day  heparin  Injectable 5000 Unit(s) SubCutaneous every 8 hours  hydrALAZINE 75 milliGRAM(s) Oral three times a day  insulin glargine Injectable (LANTUS) 5 Unit(s) SubCutaneous at bedtime  insulin lispro (HumaLOG) corrective regimen sliding scale   SubCutaneous three times a day before meals  insulin lispro (HumaLOG) corrective regimen sliding scale   SubCutaneous at bedtime  iron sucrose IVPB 200 milliGRAM(s) IV Intermittent every 24 hours  levothyroxine 50 MICROGram(s) Oral daily  losartan 50 milliGRAM(s) Oral daily  pantoprazole    Tablet 40 milliGRAM(s) Oral before breakfast  rosuvastatin 40 milliGRAM(s) Oral at bedtime  senna 2 Tablet(s) Oral at bedtime  ticagrelor 90 milliGRAM(s) Oral two times a day    MEDICATIONS  (PRN):  dextrose 40% Gel 15 Gram(s) Oral once PRN Blood Glucose LESS THAN 70 milliGRAM(s)/deciliter  glucagon  Injectable 1 milliGRAM(s) IntraMuscular once PRN Glucose LESS THAN 70 milligrams/deciliter  sodium chloride 0.9% lock flush 10 milliLiter(s) IV Push every 1 hour PRN Pre/post blood products, medications, blood draw, and to maintain line patency      Allergies: Allergies    No Known Allergies        REVIEW OF SYSTEMS:  CONSTITUTIONAL: No fever  NECK: No pain or stiffness  RESPIRATORY: No cough, wheezing, chills or hemoptysis; No shortness of breath  CARDIOVASCULAR: No chest pain, palpitations, dizziness, or leg swelling  GASTROINTESTINAL: No abdominal or epigastric pain. No nausea, vomiting, or hematemesis; No diarrhea   GENITOURINARY: No dysuria  NEUROLOGICAL: No headaches      T(C): 37 (06-08-19 @ 13:12), Max: 37 (06-08-19 @ 13:12)  HR: 71 (06-08-19 @ 13:12) (67 - 79)  BP: 178/69 (06-08-19 @ 13:12) (131/53 - 208/76)  RR: 16 (06-08-19 @ 13:12) (16 - 18)  SpO2: 98% (06-08-19 @ 13:12) (97% - 100%)  Wt(kg): --Vital Signs Last 24 Hrs  T(C): 37 (08 Jun 2019 13:12), Max: 37 (08 Jun 2019 13:12)  T(F): 98.6 (08 Jun 2019 13:12), Max: 98.6 (08 Jun 2019 13:12)  HR: 71 (08 Jun 2019 13:12) (67 - 79)  BP: 178/69 (08 Jun 2019 13:12) (131/53 - 208/76)  BP(mean): --  RR: 16 (08 Jun 2019 13:12) (16 - 18)  SpO2: 98% (08 Jun 2019 13:12) (97% - 100%)  I&O's Summary    07 Jun 2019 07:01  -  08 Jun 2019 07:00  --------------------------------------------------------  IN: 900 mL / OUT: 2400 mL / NET: -1500 mL    08 Jun 2019 07:01  -  08 Jun 2019 16:51  --------------------------------------------------------  IN: 600 mL / OUT: 0 mL / NET: 600 mL          PHYSICAL EXAM:  GENERAL: NAD, well-groomed, well-developed  HEAD:  Atraumatic, Normocephalic  EYES: EOMI, PERRLA, conjunctiva and sclera clear  NECK: Supple, No JVD, Normal thyroid  NERVOUS SYSTEM:  Alert & Oriented X3, Good concentration  CHEST/LUNG: Clear to percussion bilaterally; No rales, rhonchi, wheezing, or rubs  HEART: Regular rate and rhythm; No murmurs, rubs, or gallops  ABDOMEN: Soft, Nontender, Nondistended; Bowel sounds present  EXTREMITIES:  2+ Peripheral Pulses, No clubbing, cyanosis, or edema    Consultant(s) Notes Reviewed:  [x ] YES  [ ] NO  Care Discussed with Consultants/Other Providers [ x] YES  [ ] NO  Name of Consultant  LABS:                        8.0    8.37  )-----------( 223      ( 08 Jun 2019 09:14 )             26.0     06-08    137  |  97  |  23  ----------------------------<  287<H>  4.1   |  26  |  4.37<H>    Ca    8.1<L>      08 Jun 2019 06:15  Phos  2.7     06-08  Mg     1.9     06-08          CAPILLARY BLOOD GLUCOSE      POCT Blood Glucose.: 253 mg/dL (08 Jun 2019 11:40)  POCT Blood Glucose.: 248 mg/dL (08 Jun 2019 07:26)  POCT Blood Glucose.: 130 mg/dL (07 Jun 2019 22:56)  POCT Blood Glucose.: 354 mg/dL (07 Jun 2019 17:16)            RADIOLOGY & ADDITIONAL TESTS:  EKG :     Imaging Personally Reviewed:  [ ] YES  [ ] NO  HEALTH ISSUES - PROBLEM Dx:  ESRD (end stage renal disease) on dialysis: ESRD (end stage renal disease) on dialysis  Hypothyroidism: Hypothyroidism  Dyslipidemia: Dyslipidemia  Preventive measure: Preventive measure  Type 2 diabetes mellitus with chronic kidney disease, with long-term current use of insulin, unspecified CKD stage: Type 2 diabetes mellitus with chronic kidney disease, with long-term current use of insulin, unspecified CKD stage  CAD (Coronary Artery Disease): CAD (Coronary Artery Disease)  Hypertensive urgency: Hypertensive urgency  End stage renal disease: End stage renal disease  Hypertension: Hypertension  Chronic kidney disease-mineral and bone disorder: Chronic kidney disease-mineral and bone disorder  Metabolic acidosis: Metabolic acidosis  Anemia due to chronic kidney disease: Anemia due to chronic kidney disease  CKD (chronic kidney disease), stage V: CKD (chronic kidney disease), stage V

## 2019-06-08 NOTE — PROGRESS NOTE ADULT - PROBLEM SELECTOR PLAN 1
- s/p tunnel catheter placement by IR on 6/7 complicated by Hypotension, now hypertensive, asymptomatic  -  HD on 6/7  - no need for AVF per nephro as pt maybe able to proceed with renal transplant in .o  - continue sodium bicarbonate tablets TID (home medication).   - calcium carbonate supplements TID (per renal recs- for hypocalcemia)  - pt will need outpt HD initiated d/w CM, family to complete medicare application   - plan for HD on MWF as per nephrology

## 2019-06-08 NOTE — PROGRESS NOTE ADULT - PROBLEM SELECTOR PLAN 5
- pt on levemir 8U qhs, novolog 7/8/7U pre-meals.   - diet: renal   -glucose is not well controlled on Lantus / will restart premeal humalog

## 2019-06-08 NOTE — PROVIDER CONTACT NOTE (OTHER) - BACKGROUND
Pt admitted for hypertensive emergency/ renal failure
Pt admitted for hypertensive urgency, ESRD, cardiac cath
Pt admitted for renal failure
Pt admitted for renal failure. Previous BP was 185/71. PO hydralazine 50 mg was given
Pt admitted for renal failure. Small amount of bleeding already present on dressing upon arrival to floor last night & as per ED RN. Dressing was marked
friends

## 2019-06-08 NOTE — PROVIDER CONTACT NOTE (OTHER) - ASSESSMENT
Pt VSS, a&ox4; denies N/V/pain; any change in status at all
AO4, asymptomatic, all other VSS
CI=035/78, pt is asymptomatic.
More bleeding is noted on dressing than last night, Blood on gown as well
Pt A&OX4, /69, HR 65, Pt denies cp, sob, and distress, SR on tele
Pt A&Ox4, BP elevated, pt asymptomatic, s/p cardiac cath. BP meds given

## 2019-06-08 NOTE — PROVIDER CONTACT NOTE (OTHER) - RECOMMENDATIONS
WALDO suarez, RN to call IR
Come to bedside to assess pt
Radha Medrano NP made aware
give PM meds; coreg, lasix, recheck bp in 1 hour; will con't to monitor.

## 2019-06-08 NOTE — PROGRESS NOTE ADULT - PROBLEM SELECTOR PLAN 2
Continue medications: - hydralazine PO 50 mg TID, lasix 40 mg BID (all home medications).  -incr to  hydralazine to 100 mg TID  - c/w losartan 50 mg daily  monitor BP

## 2019-06-09 LAB
ANION GAP SERPL CALC-SCNC: 16 MMOL/L — SIGNIFICANT CHANGE UP (ref 5–17)
BUN SERPL-MCNC: 41 MG/DL — HIGH (ref 7–23)
CALCIUM SERPL-MCNC: 8.4 MG/DL — SIGNIFICANT CHANGE UP (ref 8.4–10.5)
CHLORIDE SERPL-SCNC: 96 MMOL/L — SIGNIFICANT CHANGE UP (ref 96–108)
CO2 SERPL-SCNC: 23 MMOL/L — SIGNIFICANT CHANGE UP (ref 22–31)
CREAT SERPL-MCNC: 6.22 MG/DL — HIGH (ref 0.5–1.3)
GLUCOSE BLDC GLUCOMTR-MCNC: 159 MG/DL — HIGH (ref 70–99)
GLUCOSE BLDC GLUCOMTR-MCNC: 168 MG/DL — HIGH (ref 70–99)
GLUCOSE BLDC GLUCOMTR-MCNC: 192 MG/DL — HIGH (ref 70–99)
GLUCOSE BLDC GLUCOMTR-MCNC: 200 MG/DL — HIGH (ref 70–99)
GLUCOSE SERPL-MCNC: 136 MG/DL — HIGH (ref 70–99)
HCT VFR BLD CALC: 25.5 % — LOW (ref 39–50)
HGB BLD-MCNC: 8.1 G/DL — LOW (ref 13–17)
MCHC RBC-ENTMCNC: 28.5 PG — SIGNIFICANT CHANGE UP (ref 27–34)
MCHC RBC-ENTMCNC: 31.8 GM/DL — LOW (ref 32–36)
MCV RBC AUTO: 89.8 FL — SIGNIFICANT CHANGE UP (ref 80–100)
PLATELET # BLD AUTO: 237 K/UL — SIGNIFICANT CHANGE UP (ref 150–400)
POTASSIUM SERPL-MCNC: 3.7 MMOL/L — SIGNIFICANT CHANGE UP (ref 3.5–5.3)
POTASSIUM SERPL-SCNC: 3.7 MMOL/L — SIGNIFICANT CHANGE UP (ref 3.5–5.3)
RBC # BLD: 2.84 M/UL — LOW (ref 4.2–5.8)
RBC # FLD: 13.2 % — SIGNIFICANT CHANGE UP (ref 10.3–14.5)
SODIUM SERPL-SCNC: 135 MMOL/L — SIGNIFICANT CHANGE UP (ref 135–145)
WBC # BLD: 9.72 K/UL — SIGNIFICANT CHANGE UP (ref 3.8–10.5)
WBC # FLD AUTO: 9.72 K/UL — SIGNIFICANT CHANGE UP (ref 3.8–10.5)

## 2019-06-09 PROCEDURE — 99233 SBSQ HOSP IP/OBS HIGH 50: CPT

## 2019-06-09 RX ORDER — LOSARTAN POTASSIUM 100 MG/1
50 TABLET, FILM COATED ORAL ONCE
Refills: 0 | Status: COMPLETED | OUTPATIENT
Start: 2019-06-09 | End: 2019-06-09

## 2019-06-09 RX ORDER — LOSARTAN POTASSIUM 100 MG/1
100 TABLET, FILM COATED ORAL DAILY
Refills: 0 | Status: DISCONTINUED | OUTPATIENT
Start: 2019-06-10 | End: 2019-06-11

## 2019-06-09 RX ADMIN — Medication 100 MILLIGRAM(S): at 21:32

## 2019-06-09 RX ADMIN — Medication 100 MILLIGRAM(S): at 14:11

## 2019-06-09 RX ADMIN — Medication 40 MILLIGRAM(S): at 05:58

## 2019-06-09 RX ADMIN — LOSARTAN POTASSIUM 50 MILLIGRAM(S): 100 TABLET, FILM COATED ORAL at 17:44

## 2019-06-09 RX ADMIN — Medication 1: at 11:56

## 2019-06-09 RX ADMIN — HEPARIN SODIUM 5000 UNIT(S): 5000 INJECTION INTRAVENOUS; SUBCUTANEOUS at 05:57

## 2019-06-09 RX ADMIN — Medication 3 UNIT(S): at 08:00

## 2019-06-09 RX ADMIN — Medication 2 TABLET(S): at 14:10

## 2019-06-09 RX ADMIN — Medication 1: at 08:00

## 2019-06-09 RX ADMIN — TICAGRELOR 90 MILLIGRAM(S): 90 TABLET ORAL at 17:44

## 2019-06-09 RX ADMIN — Medication 3 UNIT(S): at 11:55

## 2019-06-09 RX ADMIN — Medication 3 UNIT(S): at 17:44

## 2019-06-09 RX ADMIN — Medication 2 TABLET(S): at 05:57

## 2019-06-09 RX ADMIN — Medication 100 MILLIGRAM(S): at 05:58

## 2019-06-09 RX ADMIN — Medication 2 TABLET(S): at 21:32

## 2019-06-09 RX ADMIN — Medication 1: at 17:44

## 2019-06-09 RX ADMIN — CARVEDILOL PHOSPHATE 6.25 MILLIGRAM(S): 80 CAPSULE, EXTENDED RELEASE ORAL at 11:55

## 2019-06-09 RX ADMIN — CHLORHEXIDINE GLUCONATE 1 APPLICATION(S): 213 SOLUTION TOPICAL at 06:44

## 2019-06-09 RX ADMIN — INSULIN GLARGINE 5 UNIT(S): 100 INJECTION, SOLUTION SUBCUTANEOUS at 21:52

## 2019-06-09 RX ADMIN — Medication 40 MILLIGRAM(S): at 17:44

## 2019-06-09 RX ADMIN — Medication 50 MICROGRAM(S): at 05:58

## 2019-06-09 RX ADMIN — Medication 100 MILLIGRAM(S): at 06:14

## 2019-06-09 RX ADMIN — TICAGRELOR 90 MILLIGRAM(S): 90 TABLET ORAL at 05:58

## 2019-06-09 RX ADMIN — Medication 81 MILLIGRAM(S): at 11:55

## 2019-06-09 RX ADMIN — CARVEDILOL PHOSPHATE 6.25 MILLIGRAM(S): 80 CAPSULE, EXTENDED RELEASE ORAL at 23:48

## 2019-06-09 RX ADMIN — HEPARIN SODIUM 5000 UNIT(S): 5000 INJECTION INTRAVENOUS; SUBCUTANEOUS at 21:32

## 2019-06-09 RX ADMIN — PANTOPRAZOLE SODIUM 40 MILLIGRAM(S): 20 TABLET, DELAYED RELEASE ORAL at 05:58

## 2019-06-09 RX ADMIN — ROSUVASTATIN CALCIUM 40 MILLIGRAM(S): 5 TABLET ORAL at 21:32

## 2019-06-09 RX ADMIN — LOSARTAN POTASSIUM 50 MILLIGRAM(S): 100 TABLET, FILM COATED ORAL at 05:58

## 2019-06-09 RX ADMIN — HEPARIN SODIUM 5000 UNIT(S): 5000 INJECTION INTRAVENOUS; SUBCUTANEOUS at 14:10

## 2019-06-09 NOTE — PROGRESS NOTE ADULT - PROBLEM SELECTOR PLAN 2
- CW Hydralazine 100 mg po every 8 hres   - Incr Losartan to 100mg oral daily   -CW Coreg 6.25 mg every 12 hours   - cw Furosemide 40 mg po BID   monitor BP/ not at goal - CW Hydralazine 100 mg po every 8 hres   - Incr Losartan to 100mg oral daily   -CW Coreg 6.25 mg every 12 hours   - cw Furosemide 40 mg po BID   monitor BP/ not at goal  cont to monitor

## 2019-06-09 NOTE — PROGRESS NOTE ADULT - PROBLEM SELECTOR PLAN 5
- pt on levemir 8U qhs, novolog 7/8/7U pre-meals.   - diet: renal   -improving glucose / cont  Lantus /premeal humalog

## 2019-06-09 NOTE — PROGRESS NOTE ADULT - PROBLEM SELECTOR PLAN 1
- s/p tunnel catheter placement by IR on 6/7 complicated by Hypotension, now hypertensive, asymptomatic  -  last HD on 6/7  - no need for AVF per nephro as pt maybe able to proceed with renal transplant in .o  - continue sodium bicarbonate tablets TID (home medication).   - calcium carbonate supplements TID (per renal recs- for hypocalcemia)  - pt will need outpt HD initiated d/w CM, family to complete medicare application   - plan for HD on MWF as per nephrology

## 2019-06-09 NOTE — PROGRESS NOTE ADULT - SUBJECTIVE AND OBJECTIVE BOX
Patient is a 63y old  Male who presents with a chief complaint of needs cardiac cath and new HD (08 Jun 2019 16:51)      INTERVAL HPI/OVERNIGHT EVENTS:    Medications:MEDICATIONS  (STANDING):  aspirin enteric coated 81 milliGRAM(s) Oral daily  calcium carbonate    500 mG (Tums) Chewable 2 Tablet(s) Chew three times a day  carvedilol 6.25 milliGRAM(s) Oral every 12 hours  chlorhexidine 4% Liquid 1 Application(s) Topical <User Schedule>  dextrose 5%. 1000 milliLiter(s) (50 mL/Hr) IV Continuous <Continuous>  dextrose 50% Injectable 12.5 Gram(s) IV Push once  dextrose 50% Injectable 25 Gram(s) IV Push once  dextrose 50% Injectable 25 Gram(s) IV Push once  docusate sodium 100 milliGRAM(s) Oral three times a day  doxercalciferol Injectable 1 MICROGram(s) IV Push <User Schedule>  furosemide    Tablet 40 milliGRAM(s) Oral two times a day  heparin  Injectable 5000 Unit(s) SubCutaneous every 8 hours  hydrALAZINE 100 milliGRAM(s) Oral every 8 hours  insulin glargine Injectable (LANTUS) 5 Unit(s) SubCutaneous at bedtime  insulin lispro (HumaLOG) corrective regimen sliding scale   SubCutaneous three times a day before meals  insulin lispro (HumaLOG) corrective regimen sliding scale   SubCutaneous at bedtime  insulin lispro Injectable (HumaLOG) 3 Unit(s) SubCutaneous three times a day before meals  levothyroxine 50 MICROGram(s) Oral daily  losartan 50 milliGRAM(s) Oral once  pantoprazole    Tablet 40 milliGRAM(s) Oral before breakfast  rosuvastatin 40 milliGRAM(s) Oral at bedtime  senna 2 Tablet(s) Oral at bedtime  ticagrelor 90 milliGRAM(s) Oral two times a day    MEDICATIONS  (PRN):  dextrose 40% Gel 15 Gram(s) Oral once PRN Blood Glucose LESS THAN 70 milliGRAM(s)/deciliter  glucagon  Injectable 1 milliGRAM(s) IntraMuscular once PRN Glucose LESS THAN 70 milligrams/deciliter  sodium chloride 0.9% lock flush 10 milliLiter(s) IV Push every 1 hour PRN Pre/post blood products, medications, blood draw, and to maintain line patency      Allergies: Allergies    No Known Allergies    Intolerances        REVIEW OF SYSTEMS:  CONSTITUTIONAL: No fever, weight loss, or fatigue  EYES: No eye pain, visual disturbances, or discharge  ENMT:  No difficulty hearing, tinnitus, vertigo; No sinus or throat pain  NECK: No pain or stiffness  BREASTS: No pain, masses, or nipple discharge  RESPIRATORY: No cough, wheezing, chills or hemoptysis; No shortness of breath  CARDIOVASCULAR: No chest pain, palpitations, dizziness, or leg swelling  GASTROINTESTINAL: No abdominal or epigastric pain. No nausea, vomiting, or hematemesis; No diarrhea or constipation. No melena or hematochezia.  GENITOURINARY: No dysuria, frequency, hematuria, or incontinence  NEUROLOGICAL: No headaches, memory loss, loss of strength, numbness, or tremors  SKIN: No itching, burning, rashes, or lesions   LYMPH NODES: No enlarged glands  ENDOCRINE: No heat or cold intolerance; No hair loss  MUSCULOSKELETAL: No joint pain or swelling; No muscle, back, or extremity pain  PSYCHIATRIC: No depression, anxiety, mood swings, or difficulty sleeping  HEME/LYMPH: No easy bruising, or bleeding gums  ALLERY AND IMMUNOLOGIC: No hives or eczema    T(C): 36.7 (06-09-19 @ 12:05), Max: 37.1 (06-08-19 @ 20:59)  HR: 72 (06-09-19 @ 12:05) (70 - 73)  BP: 201/85 (06-09-19 @ 12:05) (157/63 - 201/85)  RR: 16 (06-09-19 @ 12:05) (16 - 18)  SpO2: 100% (06-09-19 @ 12:05) (100% - 100%)  Wt(kg): --Vital Signs Last 24 Hrs  T(C): 36.7 (09 Jun 2019 12:05), Max: 37.1 (08 Jun 2019 20:59)  T(F): 98 (09 Jun 2019 12:05), Max: 98.7 (08 Jun 2019 20:59)  HR: 72 (09 Jun 2019 12:05) (70 - 73)  BP: 201/85 (09 Jun 2019 12:05) (157/63 - 201/85)  BP(mean): --  RR: 16 (09 Jun 2019 12:05) (16 - 18)  SpO2: 100% (09 Jun 2019 12:05) (100% - 100%)  I&O's Summary    08 Jun 2019 07:01  -  09 Jun 2019 07:00  --------------------------------------------------------  IN: 1180 mL / OUT: 0 mL / NET: 1180 mL    09 Jun 2019 07:01  -  09 Jun 2019 14:09  --------------------------------------------------------  IN: 360 mL / OUT: 0 mL / NET: 360 mL      Last Menstrual Period      PHYSICAL EXAM:  GENERAL: NAD, well-groomed, well-developed  HEAD:  Atraumatic, Normocephalic  EYES: EOMI, PERRLA, conjunctiva and sclera clear  ENMT: No tonsillar erythema, exudates, or enlargement; Moist mucous membranes, Good dentition, No lesions  NECK: Supple, No JVD, Normal thyroid  NERVOUS SYSTEM:  Alert & Oriented X3, Good concentration; Motor Strength 5/5 B/L upper and lower extremities; DTRs 2+ intact and symmetric  CHEST/LUNG: Clear to percussion bilaterally; No rales, rhonchi, wheezing, or rubs  HEART: Regular rate and rhythm; No murmurs, rubs, or gallops  ABDOMEN: Soft, Nontender, Nondistended; Bowel sounds present  EXTREMITIES:  2+ Peripheral Pulses, No clubbing, cyanosis, or edema  LYMPH: No lymphadenopathy noted  SKIN: No rashes or lesions    Consultant(s) Notes Reviewed:  [x ] YES  [ ] NO  Care Discussed with Consultants/Other Providers [ x] YES  [ ] NO  Name of Consultant  LABS:                        8.1    9.72  )-----------( 237      ( 09 Jun 2019 09:35 )             25.5     06-09    135  |  96  |  41<H>  ----------------------------<  136<H>  3.7   |  23  |  6.22<H>    Ca    8.4      09 Jun 2019 06:29  Phos  2.7     06-08  Mg     1.9     06-08          CAPILLARY BLOOD GLUCOSE      POCT Blood Glucose.: 192 mg/dL (09 Jun 2019 11:42)  POCT Blood Glucose.: 159 mg/dL (09 Jun 2019 07:29)  POCT Blood Glucose.: 169 mg/dL (08 Jun 2019 21:19)  POCT Blood Glucose.: 235 mg/dL (08 Jun 2019 17:11)            RADIOLOGY & ADDITIONAL TESTS:  EKG :     Imaging Personally Reviewed:  [ ] YES  [ ] NO  HEALTH ISSUES - PROBLEM Dx:  ESRD (end stage renal disease) on dialysis: ESRD (end stage renal disease) on dialysis  Hypothyroidism: Hypothyroidism  Dyslipidemia: Dyslipidemia  Preventive measure: Preventive measure  Type 2 diabetes mellitus with chronic kidney disease, with long-term current use of insulin, unspecified CKD stage: Type 2 diabetes mellitus with chronic kidney disease, with long-term current use of insulin, unspecified CKD stage  CAD (Coronary Artery Disease): CAD (Coronary Artery Disease)  Hypertensive urgency: Hypertensive urgency  End stage renal disease: End stage renal disease  Hypertension: Hypertension  Chronic kidney disease-mineral and bone disorder: Chronic kidney disease-mineral and bone disorder  Metabolic acidosis: Metabolic acidosis  Anemia due to chronic kidney disease: Anemia due to chronic kidney disease  CKD (chronic kidney disease), stage V: CKD (chronic kidney disease), stage V Patient is a 63y old  Male who presents with a chief complaint of needs cardiac cath and new HD (08 Jun 2019 16:51)      INTERVAL HPI/OVERNIGHT EVENTS:    Patient offers no complaints.  NO chest pain , no SOB, no dizziness   Medications:MEDICATIONS  (STANDING):  aspirin enteric coated 81 milliGRAM(s) Oral daily  calcium carbonate    500 mG (Tums) Chewable 2 Tablet(s) Chew three times a day  carvedilol 6.25 milliGRAM(s) Oral every 12 hours  chlorhexidine 4% Liquid 1 Application(s) Topical <User Schedule>  dextrose 5%. 1000 milliLiter(s) (50 mL/Hr) IV Continuous <Continuous>  dextrose 50% Injectable 12.5 Gram(s) IV Push once  dextrose 50% Injectable 25 Gram(s) IV Push once  dextrose 50% Injectable 25 Gram(s) IV Push once  docusate sodium 100 milliGRAM(s) Oral three times a day  doxercalciferol Injectable 1 MICROGram(s) IV Push <User Schedule>  furosemide    Tablet 40 milliGRAM(s) Oral two times a day  heparin  Injectable 5000 Unit(s) SubCutaneous every 8 hours  hydrALAZINE 100 milliGRAM(s) Oral every 8 hours  insulin glargine Injectable (LANTUS) 5 Unit(s) SubCutaneous at bedtime  insulin lispro (HumaLOG) corrective regimen sliding scale   SubCutaneous three times a day before meals  insulin lispro (HumaLOG) corrective regimen sliding scale   SubCutaneous at bedtime  insulin lispro Injectable (HumaLOG) 3 Unit(s) SubCutaneous three times a day before meals  levothyroxine 50 MICROGram(s) Oral daily  losartan 50 milliGRAM(s) Oral once  pantoprazole    Tablet 40 milliGRAM(s) Oral before breakfast  rosuvastatin 40 milliGRAM(s) Oral at bedtime  senna 2 Tablet(s) Oral at bedtime  ticagrelor 90 milliGRAM(s) Oral two times a day    MEDICATIONS  (PRN):  dextrose 40% Gel 15 Gram(s) Oral once PRN Blood Glucose LESS THAN 70 milliGRAM(s)/deciliter  glucagon  Injectable 1 milliGRAM(s) IntraMuscular once PRN Glucose LESS THAN 70 milligrams/deciliter  sodium chloride 0.9% lock flush 10 milliLiter(s) IV Push every 1 hour PRN Pre/post blood products, medications, blood draw, and to maintain line patency      Allergies: Allergies    No Known Allergies    Intolerances        REVIEW OF SYSTEMS:  CONSTITUTIONAL: No fever, weight loss, or fatigue  EYES: No eye pain, visual disturbances, or discharge  ENMT:  No difficulty hearing, tinnitus, vertigo; No sinus or throat pain  NECK: No pain or stiffness  BREASTS: No pain, masses, or nipple discharge  RESPIRATORY: No cough, wheezing, chills or hemoptysis; No shortness of breath  CARDIOVASCULAR: No chest pain, palpitations, dizziness, or leg swelling  GASTROINTESTINAL: No abdominal or epigastric pain. No nausea, vomiting, or hematemesis; No diarrhea or constipation. No melena or hematochezia.  GENITOURINARY: No dysuria, frequency, hematuria, or incontinence  NEUROLOGICAL: No headaches, memory loss, loss of strength, numbness, or tremors  SKIN: No itching, burning, rashes, or lesions   LYMPH NODES: No enlarged glands  ENDOCRINE: No heat or cold intolerance; No hair loss  MUSCULOSKELETAL: No joint pain or swelling; No muscle, back, or extremity pain  PSYCHIATRIC: No depression, anxiety, mood swings, or difficulty sleeping  HEME/LYMPH: No easy bruising, or bleeding gums  ALLERY AND IMMUNOLOGIC: No hives or eczema    T(C): 36.7 (06-09-19 @ 12:05), Max: 37.1 (06-08-19 @ 20:59)  HR: 72 (06-09-19 @ 12:05) (70 - 73)  BP: 201/85 (06-09-19 @ 12:05) (157/63 - 201/85)  RR: 16 (06-09-19 @ 12:05) (16 - 18)  SpO2: 100% (06-09-19 @ 12:05) (100% - 100%)  Wt(kg): --Vital Signs Last 24 Hrs  T(C): 36.7 (09 Jun 2019 12:05), Max: 37.1 (08 Jun 2019 20:59)  T(F): 98 (09 Jun 2019 12:05), Max: 98.7 (08 Jun 2019 20:59)  HR: 72 (09 Jun 2019 12:05) (70 - 73)  BP: 201/85 (09 Jun 2019 12:05) (157/63 - 201/85)  BP(mean): --  RR: 16 (09 Jun 2019 12:05) (16 - 18)  SpO2: 100% (09 Jun 2019 12:05) (100% - 100%)  I&O's Summary    08 Jun 2019 07:01  -  09 Jun 2019 07:00  --------------------------------------------------------  IN: 1180 mL / OUT: 0 mL / NET: 1180 mL    09 Jun 2019 07:01  -  09 Jun 2019 14:09  --------------------------------------------------------  IN: 360 mL / OUT: 0 mL / NET: 360 mL      Last Menstrual Period      PHYSICAL EXAM:  GENERAL: NAD, well-groomed, well-developed  HEAD:  Atraumatic, Normocephalic  EYES: EOMI, PERRLA, conjunctiva and sclera clear  ENMT: No tonsillar erythema, exudates, or enlargement; Moist mucous membranes, Good dentition, No lesions  NECK: Supple, No JVD, Normal thyroid  NERVOUS SYSTEM:  Alert & Oriented X3, Good concentration; Motor Strength 5/5 B/L upper and lower extremities; DTRs 2+ intact and symmetric  CHEST/LUNG: Clear to percussion bilaterally; No rales, rhonchi, wheezing, or rubs  HEART: Regular rate and rhythm; No murmurs, rubs, or gallops  ABDOMEN: Soft, Nontender, Nondistended; Bowel sounds present  EXTREMITIES:  2+ Peripheral Pulses, No clubbing, cyanosis, or edema  LYMPH: No lymphadenopathy noted  SKIN: No rashes or lesions    Consultant(s) Notes Reviewed:  [x ] YES  [ ] NO  Care Discussed with Consultants/Other Providers [ x] YES  [ ] NO  Name of Consultant  LABS:                        8.1    9.72  )-----------( 237      ( 09 Jun 2019 09:35 )             25.5     06-09    135  |  96  |  41<H>  ----------------------------<  136<H>  3.7   |  23  |  6.22<H>    Ca    8.4      09 Jun 2019 06:29  Phos  2.7     06-08  Mg     1.9     06-08          CAPILLARY BLOOD GLUCOSE      POCT Blood Glucose.: 192 mg/dL (09 Jun 2019 11:42)  POCT Blood Glucose.: 159 mg/dL (09 Jun 2019 07:29)  POCT Blood Glucose.: 169 mg/dL (08 Jun 2019 21:19)  POCT Blood Glucose.: 235 mg/dL (08 Jun 2019 17:11)            RADIOLOGY & ADDITIONAL TESTS:  EKG :     Imaging Personally Reviewed:  [ ] YES  [ ] NO  HEALTH ISSUES - PROBLEM Dx:  ESRD (end stage renal disease) on dialysis: ESRD (end stage renal disease) on dialysis  Hypothyroidism: Hypothyroidism  Dyslipidemia: Dyslipidemia  Preventive measure: Preventive measure  Type 2 diabetes mellitus with chronic kidney disease, with long-term current use of insulin, unspecified CKD stage: Type 2 diabetes mellitus with chronic kidney disease, with long-term current use of insulin, unspecified CKD stage  CAD (Coronary Artery Disease): CAD (Coronary Artery Disease)  Hypertensive urgency: Hypertensive urgency  End stage renal disease: End stage renal disease  Hypertension: Hypertension  Chronic kidney disease-mineral and bone disorder: Chronic kidney disease-mineral and bone disorder  Metabolic acidosis: Metabolic acidosis  Anemia due to chronic kidney disease: Anemia due to chronic kidney disease  CKD (chronic kidney disease), stage V: CKD (chronic kidney disease), stage V Patient is a 63y old  Male who presents with a chief complaint of needs cardiac cath and new HD (08 Jun 2019 16:51)      INTERVAL HPI/OVERNIGHT EVENTS:    Patient offers no complaints.  NO chest pain , no SOB, no dizziness       Medications:MEDICATIONS  (STANDING):  aspirin enteric coated 81 milliGRAM(s) Oral daily  calcium carbonate    500 mG (Tums) Chewable 2 Tablet(s) Chew three times a day  carvedilol 6.25 milliGRAM(s) Oral every 12 hours  chlorhexidine 4% Liquid 1 Application(s) Topical <User Schedule>  dextrose 5%. 1000 milliLiter(s) (50 mL/Hr) IV Continuous <Continuous>  dextrose 50% Injectable 12.5 Gram(s) IV Push once  dextrose 50% Injectable 25 Gram(s) IV Push once  dextrose 50% Injectable 25 Gram(s) IV Push once  docusate sodium 100 milliGRAM(s) Oral three times a day  doxercalciferol Injectable 1 MICROGram(s) IV Push <User Schedule>  furosemide    Tablet 40 milliGRAM(s) Oral two times a day  heparin  Injectable 5000 Unit(s) SubCutaneous every 8 hours  hydrALAZINE 100 milliGRAM(s) Oral every 8 hours  insulin glargine Injectable (LANTUS) 5 Unit(s) SubCutaneous at bedtime  insulin lispro (HumaLOG) corrective regimen sliding scale   SubCutaneous three times a day before meals  insulin lispro (HumaLOG) corrective regimen sliding scale   SubCutaneous at bedtime  insulin lispro Injectable (HumaLOG) 3 Unit(s) SubCutaneous three times a day before meals  levothyroxine 50 MICROGram(s) Oral daily  losartan 50 milliGRAM(s) Oral once  pantoprazole    Tablet 40 milliGRAM(s) Oral before breakfast  rosuvastatin 40 milliGRAM(s) Oral at bedtime  senna 2 Tablet(s) Oral at bedtime  ticagrelor 90 milliGRAM(s) Oral two times a day    MEDICATIONS  (PRN):  dextrose 40% Gel 15 Gram(s) Oral once PRN Blood Glucose LESS THAN 70 milliGRAM(s)/deciliter  glucagon  Injectable 1 milliGRAM(s) IntraMuscular once PRN Glucose LESS THAN 70 milligrams/deciliter  sodium chloride 0.9% lock flush 10 milliLiter(s) IV Push every 1 hour PRN Pre/post blood products, medications, blood draw, and to maintain line patency      Allergies: Allergies    No Known Allergies        REVIEW OF SYSTEMS:  CONSTITUTIONAL: No fever  NECK: No pain or stiffness  RESPIRATORY: No cough, wheezing, chills or hemoptysis; No shortness of breath  CARDIOVASCULAR: No chest pain, palpitations, dizziness, or leg swelling  GASTROINTESTINAL: No abdominal or epigastric pain. No nausea, vomiting, or hematemesis; No diarrhea   GENITOURINARY: No dysuria  NEUROLOGICAL: No headache    T(C): 36.7 (06-09-19 @ 12:05), Max: 37.1 (06-08-19 @ 20:59)  HR: 72 (06-09-19 @ 12:05) (70 - 73)  BP: 201/85 (06-09-19 @ 12:05) (157/63 - 201/85)  RR: 16 (06-09-19 @ 12:05) (16 - 18)  SpO2: 100% (06-09-19 @ 12:05) (100% - 100%)  Wt(kg): --Vital Signs Last 24 Hrs  T(C): 36.7 (09 Jun 2019 12:05), Max: 37.1 (08 Jun 2019 20:59)  T(F): 98 (09 Jun 2019 12:05), Max: 98.7 (08 Jun 2019 20:59)  HR: 72 (09 Jun 2019 12:05) (70 - 73)  BP: 201/85 (09 Jun 2019 12:05) (157/63 - 201/85)  BP(mean): --  RR: 16 (09 Jun 2019 12:05) (16 - 18)  SpO2: 100% (09 Jun 2019 12:05) (100% - 100%)  I&O's Summary    08 Jun 2019 07:01  -  09 Jun 2019 07:00  --------------------------------------------------------  IN: 1180 mL / OUT: 0 mL / NET: 1180 mL    09 Jun 2019 07:01  -  09 Jun 2019 14:09  --------------------------------------------------------  IN: 360 mL / OUT: 0 mL / NET: 360 mL        PHYSICAL EXAM:  GENERAL: NAD, well-groomed, well-developed  HEAD:  Atraumatic, Normocephalic  NECK: Supple, No JVD, Normal thyroid  NERVOUS SYSTEM:  Alert & Oriented X3, Good concentration  CHEST/LUNG: Clear to percussion bilaterally; No rales, rhonchi, wheezing, or rubs / Anterior RT chest HD access   HEART: Regular rate and rhythm; No murmurs, rubs, or gallops  ABDOMEN: Soft, Nontender, Nondistended; Bowel sounds present  EXTREMITIES:  2+ Peripheral Pulses, No clubbing, cyanosis, or edema    Consultant(s) Notes Reviewed:  [x ] YES  [ ] NO  Care Discussed with Consultants/Other Providers [ x] YES  [ ] NO  Name of Consultant  LABS:                        8.1    9.72  )-----------( 237      ( 09 Jun 2019 09:35 )             25.5     06-09    135  |  96  |  41<H>  ----------------------------<  136<H>  3.7   |  23  |  6.22<H>    Ca    8.4      09 Jun 2019 06:29  Phos  2.7     06-08  Mg     1.9     06-08          CAPILLARY BLOOD GLUCOSE      POCT Blood Glucose.: 192 mg/dL (09 Jun 2019 11:42)  POCT Blood Glucose.: 159 mg/dL (09 Jun 2019 07:29)  POCT Blood Glucose.: 169 mg/dL (08 Jun 2019 21:19)  POCT Blood Glucose.: 235 mg/dL (08 Jun 2019 17:11)            RADIOLOGY & ADDITIONAL TESTS:  EKG :     Imaging Personally Reviewed:  [ ] YES  [ ] NO  HEALTH ISSUES - PROBLEM Dx:  ESRD (end stage renal disease) on dialysis: ESRD (end stage renal disease) on dialysis  Hypothyroidism: Hypothyroidism  Dyslipidemia: Dyslipidemia  Preventive measure: Preventive measure  Type 2 diabetes mellitus with chronic kidney disease, with long-term current use of insulin, unspecified CKD stage: Type 2 diabetes mellitus with chronic kidney disease, with long-term current use of insulin, unspecified CKD stage  CAD (Coronary Artery Disease): CAD (Coronary Artery Disease)  Hypertensive urgency: Hypertensive urgency  End stage renal disease: End stage renal disease  Hypertension: Hypertension  Chronic kidney disease-mineral and bone disorder: Chronic kidney disease-mineral and bone disorder  Metabolic acidosis: Metabolic acidosis  Anemia due to chronic kidney disease: Anemia due to chronic kidney disease  CKD (chronic kidney disease), stage V: CKD (chronic kidney disease), stage V

## 2019-06-09 NOTE — PROGRESS NOTE ADULT - PROBLEM SELECTOR PLAN 4
s/p LHC with 1 stent to LAD  - c/w plavix   - continue aspirin, Lipitor, ARB.   - c/w coreg per renal rec s/p LHC with 1 stent to LAD on 6/5  - c/w plavix   - continue aspirin, Lipitor, ARB.   - c/w coreg per renal rec

## 2019-06-10 LAB
ANION GAP SERPL CALC-SCNC: 19 MMOL/L — HIGH (ref 5–17)
BUN SERPL-MCNC: 54 MG/DL — HIGH (ref 7–23)
CALCIUM SERPL-MCNC: 8.2 MG/DL — LOW (ref 8.4–10.5)
CHLORIDE SERPL-SCNC: 95 MMOL/L — LOW (ref 96–108)
CO2 SERPL-SCNC: 20 MMOL/L — LOW (ref 22–31)
CREAT SERPL-MCNC: 7.43 MG/DL — HIGH (ref 0.5–1.3)
GLUCOSE BLDC GLUCOMTR-MCNC: 128 MG/DL — HIGH (ref 70–99)
GLUCOSE BLDC GLUCOMTR-MCNC: 188 MG/DL — HIGH (ref 70–99)
GLUCOSE BLDC GLUCOMTR-MCNC: 214 MG/DL — HIGH (ref 70–99)
GLUCOSE BLDC GLUCOMTR-MCNC: 216 MG/DL — HIGH (ref 70–99)
GLUCOSE BLDC GLUCOMTR-MCNC: 227 MG/DL — HIGH (ref 70–99)
GLUCOSE SERPL-MCNC: 202 MG/DL — HIGH (ref 70–99)
HCT VFR BLD CALC: 24.2 % — LOW (ref 39–50)
HGB BLD-MCNC: 7.9 G/DL — LOW (ref 13–17)
MCHC RBC-ENTMCNC: 28.8 PG — SIGNIFICANT CHANGE UP (ref 27–34)
MCHC RBC-ENTMCNC: 32.6 GM/DL — SIGNIFICANT CHANGE UP (ref 32–36)
MCV RBC AUTO: 88.3 FL — SIGNIFICANT CHANGE UP (ref 80–100)
PLATELET # BLD AUTO: 255 K/UL — SIGNIFICANT CHANGE UP (ref 150–400)
POTASSIUM SERPL-MCNC: 3.7 MMOL/L — SIGNIFICANT CHANGE UP (ref 3.5–5.3)
POTASSIUM SERPL-SCNC: 3.7 MMOL/L — SIGNIFICANT CHANGE UP (ref 3.5–5.3)
RBC # BLD: 2.74 M/UL — LOW (ref 4.2–5.8)
RBC # FLD: 13.3 % — SIGNIFICANT CHANGE UP (ref 10.3–14.5)
SODIUM SERPL-SCNC: 134 MMOL/L — LOW (ref 135–145)
WBC # BLD: 8.59 K/UL — SIGNIFICANT CHANGE UP (ref 3.8–10.5)
WBC # FLD AUTO: 8.59 K/UL — SIGNIFICANT CHANGE UP (ref 3.8–10.5)

## 2019-06-10 PROCEDURE — 99232 SBSQ HOSP IP/OBS MODERATE 35: CPT

## 2019-06-10 PROCEDURE — 90935 HEMODIALYSIS ONE EVALUATION: CPT

## 2019-06-10 RX ORDER — HYDRALAZINE HCL 50 MG
75 TABLET ORAL EVERY 8 HOURS
Refills: 0 | Status: DISCONTINUED | OUTPATIENT
Start: 2019-06-10 | End: 2019-06-11

## 2019-06-10 RX ORDER — INSULIN GLARGINE 100 [IU]/ML
8 INJECTION, SOLUTION SUBCUTANEOUS AT BEDTIME
Refills: 0 | Status: DISCONTINUED | OUTPATIENT
Start: 2019-06-10 | End: 2019-06-11

## 2019-06-10 RX ADMIN — Medication 50 MICROGRAM(S): at 05:51

## 2019-06-10 RX ADMIN — HEPARIN SODIUM 5000 UNIT(S): 5000 INJECTION INTRAVENOUS; SUBCUTANEOUS at 05:51

## 2019-06-10 RX ADMIN — Medication 100 MILLIGRAM(S): at 05:52

## 2019-06-10 RX ADMIN — Medication 1: at 07:47

## 2019-06-10 RX ADMIN — Medication 2 TABLET(S): at 14:59

## 2019-06-10 RX ADMIN — INSULIN GLARGINE 8 UNIT(S): 100 INJECTION, SOLUTION SUBCUTANEOUS at 22:26

## 2019-06-10 RX ADMIN — Medication 3 UNIT(S): at 12:16

## 2019-06-10 RX ADMIN — TICAGRELOR 90 MILLIGRAM(S): 90 TABLET ORAL at 05:51

## 2019-06-10 RX ADMIN — Medication 3 UNIT(S): at 07:47

## 2019-06-10 RX ADMIN — TICAGRELOR 90 MILLIGRAM(S): 90 TABLET ORAL at 17:48

## 2019-06-10 RX ADMIN — DOXERCALCIFEROL 1 MICROGRAM(S): 2.5 CAPSULE ORAL at 05:50

## 2019-06-10 RX ADMIN — LOSARTAN POTASSIUM 100 MILLIGRAM(S): 100 TABLET, FILM COATED ORAL at 05:55

## 2019-06-10 RX ADMIN — Medication 3 UNIT(S): at 17:47

## 2019-06-10 RX ADMIN — Medication 40 MILLIGRAM(S): at 05:55

## 2019-06-10 RX ADMIN — Medication 2 TABLET(S): at 22:25

## 2019-06-10 RX ADMIN — CARVEDILOL PHOSPHATE 6.25 MILLIGRAM(S): 80 CAPSULE, EXTENDED RELEASE ORAL at 23:16

## 2019-06-10 RX ADMIN — HEPARIN SODIUM 5000 UNIT(S): 5000 INJECTION INTRAVENOUS; SUBCUTANEOUS at 22:25

## 2019-06-10 RX ADMIN — Medication 81 MILLIGRAM(S): at 12:16

## 2019-06-10 RX ADMIN — ROSUVASTATIN CALCIUM 40 MILLIGRAM(S): 5 TABLET ORAL at 22:26

## 2019-06-10 RX ADMIN — HEPARIN SODIUM 5000 UNIT(S): 5000 INJECTION INTRAVENOUS; SUBCUTANEOUS at 15:00

## 2019-06-10 RX ADMIN — Medication 2: at 17:47

## 2019-06-10 RX ADMIN — PANTOPRAZOLE SODIUM 40 MILLIGRAM(S): 20 TABLET, DELAYED RELEASE ORAL at 07:48

## 2019-06-10 RX ADMIN — SENNA PLUS 2 TABLET(S): 8.6 TABLET ORAL at 22:26

## 2019-06-10 RX ADMIN — CARVEDILOL PHOSPHATE 6.25 MILLIGRAM(S): 80 CAPSULE, EXTENDED RELEASE ORAL at 12:16

## 2019-06-10 RX ADMIN — Medication 100 MILLIGRAM(S): at 05:51

## 2019-06-10 RX ADMIN — Medication 40 MILLIGRAM(S): at 17:48

## 2019-06-10 RX ADMIN — Medication 100 MILLIGRAM(S): at 14:59

## 2019-06-10 RX ADMIN — Medication 75 MILLIGRAM(S): at 23:16

## 2019-06-10 RX ADMIN — Medication 2 TABLET(S): at 05:51

## 2019-06-10 RX ADMIN — Medication 100 MILLIGRAM(S): at 22:25

## 2019-06-10 NOTE — PROGRESS NOTE ADULT - PROBLEM SELECTOR PLAN 5
Hyperphosphatemia and hypocalcemia from advanced CKD. , in range. Phos improved w/ increased tums.   - Started hectolol w/ HD.   - C/w tums 2 tabs TID w/ meals  - Monitor Ca and phos levels daily.

## 2019-06-10 NOTE — PROGRESS NOTE ADULT - PROBLEM SELECTOR PLAN 1
s/p tunnel catheter placement by IR on 6/7 complicated by Hypotension, now hypertensive, asymptomatic  - now receiving HD and setup for home HD  - no need for AVF per nephro as pt maybe able to proceed with renal transplant in 3 mo  - calcium carbonate supplements TID (per renal recs- for hypocalcemia)  - plan for HD on MWF as per nephrology

## 2019-06-10 NOTE — PROGRESS NOTE ADULT - PROBLEM SELECTOR PLAN 1
CKDV likely 2/2 DM and HTN, now ESRD. Started on HD 6/4. S/p IR-guided tunneled catheter placement on 6/7, on HD today, tolerating well  - Then HD on MWF schedule.   - NO AVF needed at this time; had only 1 LAD stent so may just need to wait 3 months for transplant.   - Please have pt set up at [facility] center- f/u Case Management

## 2019-06-10 NOTE — PROGRESS NOTE ADULT - PROBLEM SELECTOR PLAN 2
BP now improved, hypetensive crisis  - c/w Incr Losartan to 100mg oral daily   - CW Coreg 6.25 mg every 12 hours   - hydral decr to 75 mg tid given concern for dizziness postHD today, I d/w Dr Cardenas  - cw Furosemide 40 mg po BID   - monitor

## 2019-06-10 NOTE — PROGRESS NOTE ADULT - PROBLEM SELECTOR PLAN 5
At home pt on levemir 8U qhs, novolog 7/8/7U pre-meals.   - incr lantus to 8 u given hyperglycemia in AM (home dose)  - c/w lispro 3 u tid  - monitor fs

## 2019-06-10 NOTE — PROGRESS NOTE ADULT - PROBLEM SELECTOR PLAN 2
Hypotensive post tunneled catheter, but now hypertensive again.   - C/w UF with HD  - C/w losartan at 100mg/d  - C/w Coreg   - C/w lasix and if needed, can be non dialysis days  - Monitor BPs

## 2019-06-10 NOTE — PROGRESS NOTE ADULT - SUBJECTIVE AND OBJECTIVE BOX
Patient is a 63y old  Male who presents with a chief complaint of needs cardiac cath and new HD (10 Raffi 2019 13:23)        SUBJECTIVE / OVERNIGHT EVENTS:  overnight no acute events.  pt had HD today, 1hr  afterwards felt dizzy which has now resolved.   denies further dizziness. no n/v/f/chills, cp, sob, abd pain.  d/w pt importance of ambulating with assistance in the meantime.  d/w son at bedside.    CAPILLARY BLOOD GLUCOSE    POCT Blood Glucose.: 214 mg/dL (10 Raffi 2019 13:24)  POCT Blood Glucose.: 128 mg/dL (10 Raffi 2019 12:09)  POCT Blood Glucose.: 188 mg/dL (10 Raffi 2019 07:11)  POCT Blood Glucose.: 168 mg/dL (09 Jun 2019 21:16)  POCT Blood Glucose.: 200 mg/dL (09 Jun 2019 17:12)    I&O's Summary    09 Jun 2019 07:01  -  10 Raffi 2019 07:00  --------------------------------------------------------  IN: 360 mL / OUT: 0 mL / NET: 360 mL    10 Raffi 2019 07:01  -  10 Raffi 2019 15:05  --------------------------------------------------------  IN: 240 mL / OUT: 1500 mL / NET: -1260 mL    Vital Signs Last 24 Hrs  T(C): 36.8 (10 Raffi 2019 13:26), Max: 37.3 (10 Raffi 2019 05:25)  T(F): 98.3 (10 Raffi 2019 13:26), Max: 99.1 (10 Raffi 2019 05:25)  HR: 67 (10 Raffi 2019 13:26) (64 - 78)  BP: 158/69 (10 Raffi 2019 13:26) (123/49 - 174/67)  BP(mean): --  RR: 18 (10 Raffi 2019 13:26) (16 - 18)  SpO2: 100% (10 Raffi 2019 13:26) (98% - 100%)    PHYSICAL EXAM:  GENERAL:  Well appearing, in NAD  HEAD:  NCAT  EYES: PERRLA  NECK: Supple  CHEST/LUNG: CTA B/L.  r chest permacath in place  HEART: Reg rate. Normal S1, S2. No m/r/g.   ABDOMEN: SNTND.   EXTREMITIES:  2+ Peripheral Pulses, No clubbing, cyanosis, edema.  PSYCH: AAOx3    LABS:                        7.9    8.59  )-----------( 255      ( 10 Raffi 2019 08:37 )             24.2     06-10    134<L>  |  95<L>  |  54<H>  ----------------------------<  202<H>  3.7   |  20<L>  |  7.43<H>    Ca    8.2<L>      10 Raffi 2019 05:31      RADIOLOGY & ADDITIONAL TESTS:  Telemetry reviewed: sinus 70-80, 60-80 overnight  Consultant(s) Notes Reviewed:  renal  Care Discussed with Consultants/Other Providers: Floor NP, renal attending    MEDICATIONS  (STANDING):  aspirin enteric coated 81 milliGRAM(s) Oral daily  calcium carbonate    500 mG (Tums) Chewable 2 Tablet(s) Chew three times a day  carvedilol 6.25 milliGRAM(s) Oral every 12 hours  chlorhexidine 4% Liquid 1 Application(s) Topical <User Schedule>  dextrose 5%. 1000 milliLiter(s) (50 mL/Hr) IV Continuous <Continuous>  dextrose 50% Injectable 12.5 Gram(s) IV Push once  dextrose 50% Injectable 25 Gram(s) IV Push once  dextrose 50% Injectable 25 Gram(s) IV Push once  docusate sodium 100 milliGRAM(s) Oral three times a day  doxercalciferol Injectable 1 MICROGram(s) IV Push <User Schedule>  furosemide    Tablet 40 milliGRAM(s) Oral two times a day  heparin  Injectable 5000 Unit(s) SubCutaneous every 8 hours  hydrALAZINE 75 milliGRAM(s) Oral every 8 hours  insulin glargine Injectable (LANTUS) 5 Unit(s) SubCutaneous at bedtime  insulin lispro (HumaLOG) corrective regimen sliding scale   SubCutaneous three times a day before meals  insulin lispro (HumaLOG) corrective regimen sliding scale   SubCutaneous at bedtime  insulin lispro Injectable (HumaLOG) 3 Unit(s) SubCutaneous three times a day before meals  levothyroxine 50 MICROGram(s) Oral daily  losartan 100 milliGRAM(s) Oral daily  pantoprazole    Tablet 40 milliGRAM(s) Oral before breakfast  rosuvastatin 40 milliGRAM(s) Oral at bedtime  senna 2 Tablet(s) Oral at bedtime  ticagrelor 90 milliGRAM(s) Oral two times a day    MEDICATIONS  (PRN):  dextrose 40% Gel 15 Gram(s) Oral once PRN Blood Glucose LESS THAN 70 milliGRAM(s)/deciliter  glucagon  Injectable 1 milliGRAM(s) IntraMuscular once PRN Glucose LESS THAN 70 milligrams/deciliter  sodium chloride 0.9% lock flush 10 milliLiter(s) IV Push every 1 hour PRN Pre/post blood products, medications, blood draw, and to maintain line patency

## 2019-06-10 NOTE — PROGRESS NOTE ADULT - SUBJECTIVE AND OBJECTIVE BOX
[facility] DIVISION OF KIDNEY DISEASES AND HYPERTENSION -- HEMODIALYSIS NOTE  --------------------------------------------------------------------------------  Chief Complaint: ESRD/Ongoing hemodialysis requirement    24 hour events/subjective:  seen on HD        PAST HISTORY  --------------------------------------------------------------------------------  No significant changes to PMH, PSH, FHx, SHx, unless otherwise noted    ALLERGIES & MEDICATIONS  --------------------------------------------------------------------------------  Allergies    No Known Allergies    Intolerances      Standing Inpatient Medications  aspirin enteric coated 81 milliGRAM(s) Oral daily  calcium carbonate    500 mG (Tums) Chewable 2 Tablet(s) Chew three times a day  carvedilol 6.25 milliGRAM(s) Oral every 12 hours  chlorhexidine 4% Liquid 1 Application(s) Topical <User Schedule>  dextrose 5%. 1000 milliLiter(s) IV Continuous <Continuous>  dextrose 50% Injectable 12.5 Gram(s) IV Push once  dextrose 50% Injectable 25 Gram(s) IV Push once  dextrose 50% Injectable 25 Gram(s) IV Push once  docusate sodium 100 milliGRAM(s) Oral three times a day  doxercalciferol Injectable 1 MICROGram(s) IV Push <User Schedule>  furosemide    Tablet 40 milliGRAM(s) Oral two times a day  heparin  Injectable 5000 Unit(s) SubCutaneous every 8 hours  hydrALAZINE 100 milliGRAM(s) Oral every 8 hours  insulin glargine Injectable (LANTUS) 5 Unit(s) SubCutaneous at bedtime  insulin lispro (HumaLOG) corrective regimen sliding scale   SubCutaneous three times a day before meals  insulin lispro (HumaLOG) corrective regimen sliding scale   SubCutaneous at bedtime  insulin lispro Injectable (HumaLOG) 3 Unit(s) SubCutaneous three times a day before meals  levothyroxine 50 MICROGram(s) Oral daily  losartan 100 milliGRAM(s) Oral daily  pantoprazole    Tablet 40 milliGRAM(s) Oral before breakfast  rosuvastatin 40 milliGRAM(s) Oral at bedtime  senna 2 Tablet(s) Oral at bedtime  ticagrelor 90 milliGRAM(s) Oral two times a day    PRN Inpatient Medications  dextrose 40% Gel 15 Gram(s) Oral once PRN  glucagon  Injectable 1 milliGRAM(s) IntraMuscular once PRN  sodium chloride 0.9% lock flush 10 milliLiter(s) IV Push every 1 hour PRN    Gen: No weight changes, fatigue, fevers/chills, weakness  Head/Eyes/Ears/Mouth: No headache; Normal hearing; Normal vision    Respiratory: No dyspnea, cough, wheezing, hemoptysis  CV: No chest pain, PND, orthopnea  GI: No abdominal pain, diarrhea, constipation, nausea, vomiting, melena, hematochezia  : No increased frequency, dysuria, hematuria, nocturia  MSK: No joint pain/swelling; no back pain; no edema   Heme: No easy bruising or bleeding  All other systems were reviewed and are negative, except as noted.      VITALS/PHYSICAL EXAM  --------------------------------------------------------------------------------  T(C): 36.8 (06-10-19 @ 12:14), Max: 37.3 (06-10-19 @ 05:25)  HR: 67 (06-10-19 @ 12:14) (67 - 78)  BP: 145/54 (06-10-19 @ 12:14) (123/49 - 174/67)  RR: 18 (06-10-19 @ 12:14) (16 - 18)  SpO2: 100% (06-10-19 @ 12:14) (98% - 100%)  Wt(kg): --        06-09-19 @ 07:01  -  06-10-19 @ 07:00  --------------------------------------------------------  IN: 360 mL / OUT: 0 mL / NET: 360 mL    PHYSICAL EXAM: vital signs as above  in no apparent distress  Neck: Supple, no JVD,    Lungs: no rhonchi, no wheeze, no crackles  CVS: S1 S2 no M/R/G  Ext: no cyanosis or clubbing, no edema  Access: R ij tunnelled catheter    LABS/STUDIES  --------------------------------------------------------------------------------              7.9    8.59  >-----------<  255      [06-10-19 @ 08:37]              24.2     134  |  95  |  54  ----------------------------<  202      [06-10-19 @ 05:31]  3.7   |  20  |  7.43        Ca     8.2     [06-10-19 @ 05:31]        Iron 26, TIBC 205, %sat 13      [06-04-19 @ 09:27]  Ferritin 99      [06-04-19 @ 09:33]  PTH -- (Ca 6.9)      [06-05-19 @ 08:14]   562  HbA1c 5.8      [06-06-19 @ 09:20]    HBsAb >1000.0      [06-03-19 @ 15:57]  HBsAg Nonreact      [06-03-19 @ 15:57]  HBcAb Reactive      [06-03-19 @ 15:57]  HCV 0.13, Nonreact      [06-03-19 @ 15:57]

## 2019-06-10 NOTE — PHARMACOTHERAPY INTERVENTION NOTE - COMMENTS
Patient on Lantus 5 units QHS and moderate sliding scale Q6H. Hypoglycemic to 65 this morning. Recommended consider discontinue Lantus and change to low sliding scale TID and HS as patient on regular diet.    Theresa Jones, PharmD   [phone]
Counseled patient and son about aspirin and ticagrelor therapy (medication names, indications, and possible side effects) post-stent. Brilinta coverage confirmed with pharmacy, monthly copay of $30, patient agreeable. Provided one-month free trial coupon card for Medicaid/Medicare patients and antiplatelet medication card.    Theresa Jones, PharmD   [phone]

## 2019-06-11 ENCOUNTER — TRANSCRIPTION ENCOUNTER (OUTPATIENT)
Age: 64
End: 2019-06-11

## 2019-06-11 VITALS
SYSTOLIC BLOOD PRESSURE: 120 MMHG | OXYGEN SATURATION: 99 % | DIASTOLIC BLOOD PRESSURE: 51 MMHG | TEMPERATURE: 98 F | RESPIRATION RATE: 18 BRPM | HEART RATE: 63 BPM

## 2019-06-11 LAB
ANION GAP SERPL CALC-SCNC: 17 MMOL/L — SIGNIFICANT CHANGE UP (ref 5–17)
BUN SERPL-MCNC: 39 MG/DL — HIGH (ref 7–23)
CALCIUM SERPL-MCNC: 8.2 MG/DL — LOW (ref 8.4–10.5)
CHLORIDE SERPL-SCNC: 91 MMOL/L — LOW (ref 96–108)
CO2 SERPL-SCNC: 24 MMOL/L — SIGNIFICANT CHANGE UP (ref 22–31)
CREAT SERPL-MCNC: 5.89 MG/DL — HIGH (ref 0.5–1.3)
GLUCOSE BLDC GLUCOMTR-MCNC: 113 MG/DL — HIGH (ref 70–99)
GLUCOSE BLDC GLUCOMTR-MCNC: 157 MG/DL — HIGH (ref 70–99)
GLUCOSE BLDC GLUCOMTR-MCNC: 172 MG/DL — HIGH (ref 70–99)
GLUCOSE BLDC GLUCOMTR-MCNC: 288 MG/DL — HIGH (ref 70–99)
GLUCOSE SERPL-MCNC: 165 MG/DL — HIGH (ref 70–99)
MAGNESIUM SERPL-MCNC: 1.9 MG/DL — SIGNIFICANT CHANGE UP (ref 1.6–2.6)
POTASSIUM SERPL-MCNC: 3.6 MMOL/L — SIGNIFICANT CHANGE UP (ref 3.5–5.3)
POTASSIUM SERPL-SCNC: 3.6 MMOL/L — SIGNIFICANT CHANGE UP (ref 3.5–5.3)
SODIUM SERPL-SCNC: 132 MMOL/L — LOW (ref 135–145)

## 2019-06-11 PROCEDURE — 99239 HOSP IP/OBS DSCHRG MGMT >30: CPT

## 2019-06-11 RX ORDER — LOSARTAN POTASSIUM 100 MG/1
1 TABLET, FILM COATED ORAL
Qty: 0 | Refills: 0 | DISCHARGE

## 2019-06-11 RX ORDER — ATORVASTATIN CALCIUM 80 MG/1
80 TABLET, FILM COATED ORAL AT BEDTIME
Refills: 0 | Status: DISCONTINUED | OUTPATIENT
Start: 2019-06-11 | End: 2019-06-11

## 2019-06-11 RX ORDER — LEVOTHYROXINE SODIUM 125 MCG
1 TABLET ORAL
Qty: 0 | Refills: 0 | DISCHARGE
Start: 2019-06-11

## 2019-06-11 RX ORDER — CALCIUM CARBONATE 500(1250)
2 TABLET ORAL
Qty: 180 | Refills: 0
Start: 2019-06-11 | End: 2019-07-10

## 2019-06-11 RX ORDER — LEVOTHYROXINE SODIUM 125 MCG
1 TABLET ORAL
Qty: 0 | Refills: 0 | DISCHARGE

## 2019-06-11 RX ORDER — CARVEDILOL PHOSPHATE 80 MG/1
1 CAPSULE, EXTENDED RELEASE ORAL
Qty: 60 | Refills: 0
Start: 2019-06-11 | End: 2019-07-10

## 2019-06-11 RX ORDER — ATORVASTATIN CALCIUM 80 MG/1
1 TABLET, FILM COATED ORAL
Qty: 0 | Refills: 0 | DISCHARGE

## 2019-06-11 RX ORDER — ATORVASTATIN CALCIUM 80 MG/1
40 TABLET, FILM COATED ORAL AT BEDTIME
Refills: 0 | Status: DISCONTINUED | OUTPATIENT
Start: 2019-06-11 | End: 2019-06-11

## 2019-06-11 RX ORDER — SODIUM BICARBONATE 1 MEQ/ML
1 SYRINGE (ML) INTRAVENOUS
Qty: 0 | Refills: 0 | DISCHARGE

## 2019-06-11 RX ORDER — FUROSEMIDE 40 MG
1 TABLET ORAL
Qty: 0 | Refills: 0 | DISCHARGE
Start: 2019-06-11

## 2019-06-11 RX ORDER — ATORVASTATIN CALCIUM 80 MG/1
1 TABLET, FILM COATED ORAL
Qty: 30 | Refills: 0
Start: 2019-06-11 | End: 2019-07-10

## 2019-06-11 RX ORDER — METOPROLOL TARTRATE 50 MG
1 TABLET ORAL
Qty: 0 | Refills: 0 | DISCHARGE

## 2019-06-11 RX ORDER — FUROSEMIDE 40 MG
1 TABLET ORAL
Qty: 0 | Refills: 0 | DISCHARGE

## 2019-06-11 RX ORDER — LOSARTAN POTASSIUM 100 MG/1
1 TABLET, FILM COATED ORAL
Qty: 30 | Refills: 0
Start: 2019-06-11 | End: 2019-07-10

## 2019-06-11 RX ORDER — INSULIN ASPART 100 [IU]/ML
7 INJECTION, SOLUTION SUBCUTANEOUS
Qty: 0 | Refills: 0 | DISCHARGE

## 2019-06-11 RX ORDER — HYDRALAZINE HCL 50 MG
50 TABLET ORAL EVERY 8 HOURS
Refills: 0 | Status: DISCONTINUED | OUTPATIENT
Start: 2019-06-11 | End: 2019-06-11

## 2019-06-11 RX ORDER — INSULIN ASPART 100 [IU]/ML
8 INJECTION, SOLUTION SUBCUTANEOUS
Qty: 0 | Refills: 0 | DISCHARGE

## 2019-06-11 RX ADMIN — CHLORHEXIDINE GLUCONATE 1 APPLICATION(S): 213 SOLUTION TOPICAL at 05:49

## 2019-06-11 RX ADMIN — Medication 3 UNIT(S): at 09:16

## 2019-06-11 RX ADMIN — HEPARIN SODIUM 5000 UNIT(S): 5000 INJECTION INTRAVENOUS; SUBCUTANEOUS at 14:26

## 2019-06-11 RX ADMIN — Medication 100 MILLIGRAM(S): at 05:49

## 2019-06-11 RX ADMIN — Medication 75 MILLIGRAM(S): at 05:48

## 2019-06-11 RX ADMIN — Medication 81 MILLIGRAM(S): at 12:54

## 2019-06-11 RX ADMIN — Medication 3 UNIT(S): at 17:57

## 2019-06-11 RX ADMIN — Medication 3: at 09:16

## 2019-06-11 RX ADMIN — Medication 50 MILLIGRAM(S): at 14:25

## 2019-06-11 RX ADMIN — TICAGRELOR 90 MILLIGRAM(S): 90 TABLET ORAL at 17:12

## 2019-06-11 RX ADMIN — Medication 2 TABLET(S): at 14:25

## 2019-06-11 RX ADMIN — Medication 2 TABLET(S): at 05:48

## 2019-06-11 RX ADMIN — TICAGRELOR 90 MILLIGRAM(S): 90 TABLET ORAL at 05:48

## 2019-06-11 RX ADMIN — Medication 50 MICROGRAM(S): at 05:48

## 2019-06-11 RX ADMIN — LOSARTAN POTASSIUM 100 MILLIGRAM(S): 100 TABLET, FILM COATED ORAL at 05:48

## 2019-06-11 RX ADMIN — HEPARIN SODIUM 5000 UNIT(S): 5000 INJECTION INTRAVENOUS; SUBCUTANEOUS at 05:49

## 2019-06-11 RX ADMIN — PANTOPRAZOLE SODIUM 40 MILLIGRAM(S): 20 TABLET, DELAYED RELEASE ORAL at 05:48

## 2019-06-11 RX ADMIN — Medication 1: at 17:57

## 2019-06-11 RX ADMIN — Medication 40 MILLIGRAM(S): at 17:12

## 2019-06-11 RX ADMIN — CARVEDILOL PHOSPHATE 6.25 MILLIGRAM(S): 80 CAPSULE, EXTENDED RELEASE ORAL at 12:54

## 2019-06-11 RX ADMIN — Medication 3 UNIT(S): at 12:53

## 2019-06-11 RX ADMIN — Medication 100 MILLIGRAM(S): at 14:26

## 2019-06-11 RX ADMIN — Medication 40 MILLIGRAM(S): at 05:48

## 2019-06-11 NOTE — PROGRESS NOTE ADULT - PROBLEM SELECTOR PLAN 7
No active issues.  Continue home medication levothyroxine 50 mcg daily.
No active issues.  - Continue home medication levothyroxine 50 mcg daily.
No active issues.  Continue home medication levothyroxine 50 mcg daily.
No active issues.  - Continue home medication levothyroxine 50 mcg daily.
No active issues.  Continue home medication levothyroxine 50 mcg daily.

## 2019-06-11 NOTE — PROGRESS NOTE ADULT - ASSESSMENT
ASSESSMENT: Patient is a 63M w/ PMHx most notable for ESRD now admitted for cardiac cath prior to possible transplant will require hemodialysis in meantime so vascular surgery consulted for AVF placement    PLAN:    - Bilateral vein mapping, pending  - Please protect left upper extremity, NO IV's, BP cuff or Blood draws  - Document medical and cardiac clearance for AVF placement  - PAtient pending heart cath scheduled for tomorrow  - will follow for AVF placement likely this admission      Vascular Surgery team  pager [phone]
63M with ESRD not previously on HD, DMII on insulin, CAD, HTN, HLD, hypothyroidism, presenting per advice of MD to get cardiac cath for clearance prior to receiving renal transplant.
64 y/o M PMH ESRD not previously on HD, DMII on insulin, CAD, HTN, HLD, hypothyroidism, presenting per advice of MD to get cardiac cath for clearance prior to receiving renal transplant now s/p LHC PCI to LAD on 6/5/19, receiving HD. Hosp course c/b venous air embolism, now extubated and with hypertensive crisis. Pending set up of home HD.
63M with ESRD not previously on HD, DMII on insulin, CAD, HTN, HLD, hypothyroidism, presenting per advice of MD to get cardiac cath for clearance prior to receiving renal transplant.
63yoM w/ CKDV, CAD s/p stent 2009, HTN, DMII, who was sent to the ER for LHC.
ASSESSMENT: Patient is a 63M w/ PMHx most notable for ESRD now admitted for cardiac cath prior to possible transplant will require hemodialysis in meantime so vascular surgery consulted for AVF placement    PLAN:    - Please protect left upper extremity, NO IV's, BP cuff or Blood draws  - Document medical and cardiac clearance for AVF placement  - Patient pending stress test scheduled for today  - will follow for AVF placement likely this admission, no date yet    VASCULAR SURGERY  [id number]
63yoM w/ CKDV, CAD s/p stent 2009, HTN, DMII, who was sent to the ER for cardiac stent and starting dialysis
63M with ESRD not previously on HD, DMII on insulin, CAD, HTN, HLD, hypothyroidism, presenting per advice of MD to get cardiac cath for clearance prior to receiving renal transplant.
64 y/o M PMH ESRD not previously on HD, DMII on insulin, CAD, HTN, HLD, hypothyroidism, presenting per advice of MD to get cardiac cath for clearance prior to receiving renal transplant now s/p LHC PCI to LAD on 6/5/19, receiving HD. Hosp course c/b venous air embolism, now extubated and with hypertensive crisis. Pending set up of home HD.
63M with ESRD not previously on HD, DMII on insulin, CAD, HTN, HLD, hypothyroidism, presenting per advice of MD to get cardiac cath for clearance prior to receiving renal transplant.

## 2019-06-11 NOTE — PROGRESS NOTE ADULT - PROBLEM SELECTOR PLAN 3
with iron deficiency. Hgb low to 8 range. was on Aranesp 40 qMonthly. Iron studies showing low iron sats.   - Venofer 200mg IV daily x5 doses for total 1g load.   - Monitor CBC.   - Given recent stent to LAD, no IGOR for now.   - cannot get PRBCs as well as planned for renal transplant in few months and risk of ab formations.
Hgb 8.1 no active bleeding reported, FOBT negative.  Likely in setting of chronic kidney disease.   - procrit injections  - monitor
Hgb 8.7, no active bleeding reported, FOBT negative.  Likely in setting of chronic kidney disease.   - continue ferrous sulfate supplement daily.   - pt on procrit injections as outpatient, continue [id number] per nephrology.   - iron studies consistent with above
Hgb 8.1 no active bleeding reported, FOBT negative.  Likely in setting of chronic kidney disease.   - procrit injections  - monitor
Hgb 8.1 no active bleeding reported, FOBT negative.  Likely in setting of chronic kidney disease.   - procrit injections: holding off due to recent stent placement   - CW Iron supp by nephrology
Hgb 7.5, no active bleeding reported, FOBT negative.  Likely in setting of chronic kidney disease.   - procrit injections: holding off due to recent stent placement   - started on Iron supp by nephrology
Hgb 8.0 no active bleeding reported, FOBT negative.  Likely in setting of chronic kidney disease.   - procrit injections: holding off due to recent stent placement   - CW Iron supp by nephrology
Hgb 8.7, no active bleeding reported, FOBT negative.  Likely in setting of chronic kidney disease.   - continue ferrous sulfate supplement daily.   - pt on procrit injections as outpatient, continue [id number] per nephrology.   - iron studies consistent with above
with iron deficiency. Hgb low to 8 range. On Aranesp 40 qMonthly. Iron studies showing low iron sats.   - Start on venofer 200mg IV daily x5 doses for total 1g load.   - Monitor CBC.   - Will await [facility] prior to placing back on IGOR
with iron deficiency. Hgb low to 8 range. On Aranesp 40 qMonthly. Iron studies showing low iron sats.   - Started on venofer 200mg IV daily x5 doses for total 1g load.   - Monitor CBC.   - given stent placed, no IGOR for now given recent thrombotic event
with iron deficiency. Hgb low to 8 range. On Aranesp 40 qMonthly. Iron studies showing low iron sats.   - Venofer 200mg IV daily x5 doses for total 1g load.   - Monitor CBC.   - Given recent stent to LAD, no IGOR for now
with iron deficiency. Hgb low to 8 range. On Aranesp 40 qMonthly. Iron studies showing low iron sats.   - Venofer 200mg IV daily x5 doses for total 1g load.   - Monitor CBC.   - Given recent stent to LAD, no IGOR for now
Hgb 7.5, no active bleeding reported, FOBT negative.  Likely in setting of chronic kidney disease.   - procrit injections: holding off due to recent stent placement   - started on Iron supp by nephrology

## 2019-06-11 NOTE — PROGRESS NOTE ADULT - PROBLEM SELECTOR PLAN 2
BP now improved, hypetensive crisis  - c/w Incr Losartan to 100mg oral daily   - CW Coreg 6.25 mg every 12 hours   - hydral decr to 50 mg tid given BP now on lower side 100s-120s and pt w/ mild dizziness  - cw Furosemide 40 mg po BID   - monitor

## 2019-06-11 NOTE — PROGRESS NOTE ADULT - PROBLEM SELECTOR PROBLEM 1
End stage renal disease
End stage renal disease
ESRD (end stage renal disease) on dialysis
End stage renal disease
ESRD (end stage renal disease) on dialysis
End stage renal disease

## 2019-06-11 NOTE — PROGRESS NOTE ADULT - PROVIDER SPECIALTY LIST ADULT
Anesthesia
Cardiology
Hospitalist
Intervent Radiology
Nephrology
Vascular Surgery
Vascular Surgery
Nephrology
Hospitalist

## 2019-06-11 NOTE — PROGRESS NOTE ADULT - PROBLEM SELECTOR PLAN 5
At home pt on levemir 8U qhs, novolog 7/8/7U pre-meals.   - c/w lantus 8 u home dose  - c/w lispro 3 u tid  - monitor fs

## 2019-06-11 NOTE — PROGRESS NOTE ADULT - PROBLEM SELECTOR PROBLEM 4
CAD (Coronary Artery Disease)
Metabolic acidosis
CAD (Coronary Artery Disease)
Metabolic acidosis

## 2019-06-11 NOTE — PROGRESS NOTE ADULT - PROBLEM SELECTOR PROBLEM 5
Chronic kidney disease-mineral and bone disorder
Type 2 diabetes mellitus with chronic kidney disease, with long-term current use of insulin, unspecified CKD stage
Chronic kidney disease-mineral and bone disorder
Type 2 diabetes mellitus with chronic kidney disease, with long-term current use of insulin, unspecified CKD stage

## 2019-06-11 NOTE — PROGRESS NOTE ADULT - PROBLEM SELECTOR PLAN 6
Continue atorvastatin 40 mg QD (home medication)
- c/w rosuvastatin
Continue atorvastatin 40 mg QD (home medication)
Continue atorvastatin 40 mg QD (home medication)
d/w pt and family, lipitor preferred in HD pts  - ok w/ changing to lipitor 80 mg po qd
Continue atorvastatin 40 mg QD (home medication)

## 2019-06-11 NOTE — PROGRESS NOTE ADULT - PROBLEM SELECTOR PLAN 8
DVT ppx: SCD's for now for procedures
DVT ppx: hsq  no PT needs  Dispo: pending set up for home HD  d/w son at bedside, all ques answered
DVT ppx: SCD's for now for procedures
DVT ppx: SCD's for now for procedures
DVT ppx: hsq  no PT needs  Dispo: pending set up for home HD  d/w son at bedside, all ques answered
DVT ppx: SCD's for now for procedures

## 2019-06-11 NOTE — PROGRESS NOTE ADULT - PROBLEM SELECTOR PROBLEM 7
Hypothyroidism

## 2019-06-11 NOTE — PROGRESS NOTE ADULT - PROBLEM SELECTOR PROBLEM 2
Hypertensive urgency
Hypertension
Hypertensive urgency
Hypertension
Hypertensive urgency
Hypertensive urgency

## 2019-06-11 NOTE — PROGRESS NOTE ADULT - REASON FOR ADMISSION
needs cardiac cath and new HD

## 2019-06-11 NOTE — DISCHARGE NOTE NURSING/CASE MANAGEMENT/SOCIAL WORK - NSDCDPATPORTLINK_GEN_ALL_CORE
You can access the [facility], offered by [facility], by registering with the following website: [email or web address]

## 2019-06-11 NOTE — PROGRESS NOTE ADULT - ATTENDING COMMENTS
63 year old man with ESRD, dialysis initiated this week and well tolerated. Has known coronary artery disease and angiography confirmed severe LAD lesion and stent placed. Continues to tolerate dialysis. IR procedure today with concern for venous air embolism, temporary arterial oxygen desaturation, intubated. Subsequently extubated, no symptoms, no findings and would not anticipate any systemic sequelae from event and initial instability is resolving.
63 year old man with ESRD, dialysis initiated three days ago and well tolerated. Has known coronary artery disease and angiography confirmed severe LAD lesion and stent placed yesterday. Continues to tolerate dialysis. In a dialysis patient favor clopidogrel rather than one of the more potent P2Y12 inhibitors.
63 year old man with ESRD, dialysis initiated today and well tolerated. Has known coronary artery disease and plan angiography with likely intervention tomorrow.
As above , will convert temporary to tunneled catheter.  COnsent obtained and reinforced.  Will perform with sedation.
63 year old man with ESRD, dialysis initiated today and well tolerated. Has known coronary artery disease and plan angiography with likely intervention tomorrow.
Tesfaye Cardenas MD  Cell   Pager   Office 
Jennifer Valerio MD  Division of Hospital Medicine  Pager: [phone]  Office: [phone]
once permacath, will need dialysis catheter placement- he prefers Fara Cardenas MD  Cell   Pager   Office 
I have seen this patient with the fellow and agree with their assessment and plan. In addition, CKD now ESRD here to start HD and get LHC in prep for kidney transplant    ESRD:- seen on hd, first session well tolerated. Needs more bp control  Next HD tomorrow post LHC   HTN:- volume mediated, can give lasix today if needed, agree to increase ARB dose  Anemia:- Iron def, can start venofer as stated  Access:- needs permacath in few days once 3 sessions done. Given not sure what LHC will show and no def date set for transplant, would favor on AVF placement regardless for long term dialysis planning. We shall more clarity on this once LHC is done and if he needs CABG vs stenting    Tesfaye Cardenas MD  Cell   Pager   Office 
I have seen this patient with the fellow and agree with their assessment and plan. In addition, CKDV now ESRD, seen on HD, tolerating well  During IR procedure today for tunnelled cath, had ? low BP and concern for pulm emb vs air embolism but resolved quickly and was able to be extubated. Cards and ICU eval noted and overall stable, now off oxygen and doing well on floor.   4th session for HD today later with new catheter  anemia- avoid IGOR given recent stent, cont venofer as above  BP high now, will do UF later with HD  No AVF for now as transplant might be planned in 3-4 months    For weekend coverage, please call Dr Francisco Lyons( fellow) or Dr. Lexus Gamboa(attending)    Tesfaye Cardenas MD  Cell   Pager   Office 
I have seen this patient with the fellow and agree with their assessment and plan. In addition, CKDV now ESRD, seen on HD, tolerating well  Per medicine, IR to do permacath tomorrow.   4th session for HD tomorrow post permacath  anemia- avoid IGOR given recent stent, cont venofer  BP better controlled now  No AVF for now as transplant might be planned in 3-4 months        Tesfaye Cardenas MD  Cell   Pager   Office 
Danyell Cruz   Hospitalist   
Jennifer Valerio MD  Division of Hospital Medicine  Pager: [phone]  Office: [phone]
Danyell Cruz   Hospitalist   
Medicare forms signed

## 2019-06-11 NOTE — PROGRESS NOTE ADULT - PROBLEM SELECTOR PLAN 1
s/p tunnel catheter placement by IR on 6/7 complicated by Hypotension followed by hypertension now improved  - now receiving HD and setup for home HD  - no need for AVF per nephro as pt maybe able to proceed with renal transplant in 3 mo  - calcium carbonate supplements TID (per renal recs- for hypocalcemia)  - plan for HD on MWF as per nephrology  - set up of outpatient HD as per case management

## 2019-06-11 NOTE — PROGRESS NOTE ADULT - SUBJECTIVE AND OBJECTIVE BOX
Patient is a 63y old  Male who presents with a chief complaint of needs cardiac cath and new HD (10 Raffi 2019 15:05)        SUBJECTIVE / OVERNIGHT EVENTS:  overnight no acute events.  denies complaints. had dizziness yesterday which is much improved today.  ambulating with family.  no cp, sob, abd pain, n/v.    CAPILLARY BLOOD GLUCOSE    POCT Blood Glucose.: 113 mg/dL (11 Jun 2019 12:08)  POCT Blood Glucose.: 288 mg/dL (11 Jun 2019 09:11)  POCT Blood Glucose.: 172 mg/dL (11 Jun 2019 07:26)  POCT Blood Glucose.: 216 mg/dL (10 Raffi 2019 21:49)  POCT Blood Glucose.: 227 mg/dL (10 Raffi 2019 17:12)    I&O's Summary    10 Raffi 2019 07:01  -  11 Jun 2019 07:00  --------------------------------------------------------  IN: 540 mL / OUT: 1500 mL / NET: -960 mL    11 Jun 2019 07:01  -  11 Jun 2019 16:09  --------------------------------------------------------  IN: 600 mL / OUT: 0 mL / NET: 600 mL    Vital Signs Last 24 Hrs  T(C): 36.4 (11 Jun 2019 14:05), Max: 36.9 (10 Raffi 2019 20:16)  T(F): 97.6 (11 Jun 2019 14:05), Max: 98.5 (10 Raffi 2019 20:16)  HR: 63 (11 Jun 2019 14:05) (63 - 80)  BP: 120/51 (11 Jun 2019 14:05) (103/60 - 126/70)  BP(mean): --  RR: 18 (11 Jun 2019 14:05) (16 - 18)  SpO2: 99% (11 Jun 2019 14:05) (99% - 100%)    PHYSICAL EXAM:  GENERAL:  Well appearing, in NAD  HEAD:  NCAT  EYES: PERRLA  NECK: Supple  CHEST/LUNG: CTA B/L.  r chest permacath in place  HEART: Reg rate. Normal S1, S2. No m/r/g.   ABDOMEN: SNTND.   EXTREMITIES:  2+ Peripheral Pulses, No clubbing, cyanosis, edema.  PSYCH: AAOx3    LABS:                        7.9    8.59  )-----------( 255      ( 10 Raffi 2019 08:37 )             24.2     06-11    132<L>  |  91<L>  |  39<H>  ----------------------------<  165<H>  3.6   |  24  |  5.89<H>    Ca    8.2<L>      11 Jun 2019 05:34  Mg     1.9     06-11    RADIOLOGY & ADDITIONAL TESTS:  Consultant(s) Notes Reviewed:  renal  Care Discussed with Consultants/Other Providers: Floor NP    MEDICATIONS  (STANDING):  aspirin enteric coated 81 milliGRAM(s) Oral daily  atorvastatin 80 milliGRAM(s) Oral at bedtime  calcium carbonate    500 mG (Tums) Chewable 2 Tablet(s) Chew three times a day  carvedilol 6.25 milliGRAM(s) Oral every 12 hours  chlorhexidine 4% Liquid 1 Application(s) Topical <User Schedule>  dextrose 5%. 1000 milliLiter(s) (50 mL/Hr) IV Continuous <Continuous>  dextrose 50% Injectable 12.5 Gram(s) IV Push once  dextrose 50% Injectable 25 Gram(s) IV Push once  dextrose 50% Injectable 25 Gram(s) IV Push once  docusate sodium 100 milliGRAM(s) Oral three times a day  doxercalciferol Injectable 1 MICROGram(s) IV Push <User Schedule>  furosemide    Tablet 40 milliGRAM(s) Oral two times a day  heparin  Injectable 5000 Unit(s) SubCutaneous every 8 hours  hydrALAZINE 50 milliGRAM(s) Oral every 8 hours  insulin glargine Injectable (LANTUS) 8 Unit(s) SubCutaneous at bedtime  insulin lispro (HumaLOG) corrective regimen sliding scale   SubCutaneous three times a day before meals  insulin lispro (HumaLOG) corrective regimen sliding scale   SubCutaneous at bedtime  insulin lispro Injectable (HumaLOG) 3 Unit(s) SubCutaneous three times a day before meals  levothyroxine 50 MICROGram(s) Oral daily  losartan 100 milliGRAM(s) Oral daily  pantoprazole    Tablet 40 milliGRAM(s) Oral before breakfast  senna 2 Tablet(s) Oral at bedtime  ticagrelor 90 milliGRAM(s) Oral two times a day    MEDICATIONS  (PRN):  dextrose 40% Gel 15 Gram(s) Oral once PRN Blood Glucose LESS THAN 70 milliGRAM(s)/deciliter  glucagon  Injectable 1 milliGRAM(s) IntraMuscular once PRN Glucose LESS THAN 70 milligrams/deciliter  sodium chloride 0.9% lock flush 10 milliLiter(s) IV Push every 1 hour PRN Pre/post blood products, medications, blood draw, and to maintain line patency

## 2019-06-11 NOTE — PROGRESS NOTE ADULT - NSHPATTENDINGPLANDISCUSS_GEN_ALL_CORE
To reach Cardiology Attending call during weekdays Spectra [phone] or Fellow .
To reach Cardiology Attending call during weekdays Spectra [phone] or Fellow .
To reach Cardiology Attending call during weekdays Spectra [phone] or Fellow .
To reach Cardiology Attending call during weekdays Spectra [phone] or Fellow .
WALDO Dai, renal Dr Cardenas
family, dr Torres and Dr Garcia
Medicine attending
Medicine attending, PACU team
family, Medicine and cards
Medicine NP PK
Medicine TAMMY Hartley
TAMMY Clark
TAMMY Metz
Medicine TAMMY Lisha
medicine TAMMY Garcia
TAMMY Metz

## 2019-06-12 ENCOUNTER — NON-APPOINTMENT (OUTPATIENT)
Age: 64
End: 2019-06-12

## 2019-06-12 ENCOUNTER — APPOINTMENT (OUTPATIENT)
Dept: CARDIOLOGY | Facility: CLINIC | Age: 64
End: 2019-06-12
Payer: COMMERCIAL

## 2019-06-12 ENCOUNTER — INBOUND DOCUMENT (OUTPATIENT)
Age: 64
End: 2019-06-12

## 2019-06-12 VITALS
RESPIRATION RATE: 12 BRPM | DIASTOLIC BLOOD PRESSURE: 58 MMHG | OXYGEN SATURATION: 100 % | HEIGHT: 66 IN | SYSTOLIC BLOOD PRESSURE: 120 MMHG | TEMPERATURE: 98.1 F | HEART RATE: 68 BPM | WEIGHT: 164 LBS | BODY MASS INDEX: 26.36 KG/M2

## 2019-06-12 VITALS — DIASTOLIC BLOOD PRESSURE: 61 MMHG | SYSTOLIC BLOOD PRESSURE: 127 MMHG

## 2019-06-12 DIAGNOSIS — K25.3 ACUTE GASTRIC ULCER W/OUT HEMORRHAGE OR PERFORATION: ICD-10-CM

## 2019-06-12 PROCEDURE — 99215 OFFICE O/P EST HI 40 MIN: CPT | Mod: 25

## 2019-06-12 PROCEDURE — 99213 OFFICE O/P EST LOW 20 MIN: CPT

## 2019-06-12 NOTE — DISCUSSION/SUMMARY
[___ Month(s)] : [unfilled] month(s) [FreeTextEntry1] : The patient is a 63-year-old gentleman diabetes mellitus, hypertension, hyperlipidemia, hypothyroidism, ESRD, coronary artery disease who is dizzy.\par #1 CAD- s/p LAD stent +IFR, on DAPT, no symptoms, clearance Dr. Maloney this PM\par #2 Htn- symptomatic hypotension, decrease hydralazine to bid\par #3 ESRD- on dialysis, decrease fluid removal, close monitoring by Dr. Cardenas, transplant postponed\par #5 DM- on insulin, good control

## 2019-06-12 NOTE — PHYSICAL EXAM
[General Appearance - Well Developed] : well developed [Normal Appearance] : normal appearance [Well Groomed] : well groomed [General Appearance - Well Nourished] : well nourished [No Deformities] : no deformities [General Appearance - In No Acute Distress] : no acute distress [Normal Conjunctiva] : the conjunctiva exhibited no abnormalities [Eyelids - No Xanthelasma] : the eyelids demonstrated no xanthelasmas [Normal Oral Mucosa] : normal oral mucosa [No Oral Pallor] : no oral pallor [No Oral Cyanosis] : no oral cyanosis [Normal Jugular Venous A Waves Present] : normal jugular venous A waves present [Normal Jugular Venous V Waves Present] : normal jugular venous V waves present [No Jugular Venous Rodriguez A Waves] : no jugular venous rodriguez A waves [Respiration, Rhythm And Depth] : normal respiratory rhythm and effort [Exaggerated Use Of Accessory Muscles For Inspiration] : no accessory muscle use [Auscultation Breath Sounds / Voice Sounds] : lungs were clear to auscultation bilaterally [Heart Rate And Rhythm] : heart rate and rhythm were normal [Heart Sounds] : normal S1 and S2 [Murmurs] : no murmurs present [Abdomen Soft] : soft [Abdomen Tenderness] : non-tender [Abdomen Mass (___ Cm)] : no abdominal mass palpated [Abnormal Walk] : normal gait [Gait - Sufficient For Exercise Testing] : the gait was sufficient for exercise testing [Nail Clubbing] : no clubbing of the fingernails [Cyanosis, Localized] : no localized cyanosis [Petechial Hemorrhages (___cm)] : no petechial hemorrhages [] : no rash [Skin Color & Pigmentation] : normal skin color and pigmentation [No Venous Stasis] : no venous stasis [Skin Lesions] : no skin lesions [No Skin Ulcers] : no skin ulcer [No Xanthoma] : no  xanthoma was observed [Oriented To Time, Place, And Person] : oriented to person, place, and time [Affect] : the affect was normal [Mood] : the mood was normal [No Anxiety] : not feeling anxious

## 2019-06-13 ENCOUNTER — INBOUND DOCUMENT (OUTPATIENT)
Age: 64
End: 2019-06-13

## 2019-06-18 ENCOUNTER — APPOINTMENT (OUTPATIENT)
Dept: NEPHROLOGY | Facility: CLINIC | Age: 64
End: 2019-06-18
Payer: COMMERCIAL

## 2019-06-18 ENCOUNTER — APPOINTMENT (OUTPATIENT)
Dept: TRANSPLANT | Facility: CLINIC | Age: 64
End: 2019-06-18

## 2019-06-18 VITALS
DIASTOLIC BLOOD PRESSURE: 79 MMHG | HEART RATE: 68 BPM | SYSTOLIC BLOOD PRESSURE: 161 MMHG | OXYGEN SATURATION: 100 % | HEIGHT: 66 IN | WEIGHT: 156 LBS | RESPIRATION RATE: 12 BRPM | BODY MASS INDEX: 25.07 KG/M2 | TEMPERATURE: 98.1 F

## 2019-06-18 DIAGNOSIS — Z01.818 ENCOUNTER FOR OTHER PREPROCEDURAL EXAMINATION: ICD-10-CM

## 2019-06-18 PROCEDURE — 99213 OFFICE O/P EST LOW 20 MIN: CPT

## 2019-06-18 NOTE — ASSESSMENT
[FreeTextEntry1] : 1.  ESRD - Pt recently started dialysis but has been listed since 2015 or so.  He is blood type B - discussed A2 kidney transplantation and obtained consent.  Will check anti A2 titer.  No living donors.  He has been feeling dizzy while on dialysis, center now decreasing UF.  \par 2.  CAD - s/p 2 stents, latest this June.  Pt currently on aspirin and brillinta.  Will keep in internal hold until 6 months post stent.  Then if asymptomatic will proceed with transplantation.  \par 3.  Carotid bruit - check b/l carotid duplex. \par 4.  DM2 - on insulin, low dose.\par 5.  HTN - stable.  \par 6.  Cancer screening - Colonoscopy and CXR/ renal sonogram up to date.  PSA normal.\par \par I have personally discussed the risks and benefits of transplantation and patient attended transplant education class where the following was disclosed:\par  \par Reviewed factors affecting survival and morbidity while on dialysis, the transplant wait list and reviewed zackery-operative and long-term risk factors affecting outcome in kidney transplantation.  \par  \par One year SRTR outcomes for national and [facility] were discussed in regards to patient survival and graft survival after transplantation.  \par  \par Details of transplant surgery, including complications were discussed.\par Immunosuppression and complications including infection including life threatening sepsis and opportunistic infections, malignancy and new onset diabetes were discussed.  \par  \par Benefits of live donor transplantation as well as variability in wait times across regions and multiple listing were discussed. \par A2 kidneys were discussed\par  \par \par \par

## 2019-06-18 NOTE — REASON FOR VISIT
[Follow-Up - Clinic] : a clinic follow-up of [Coronary Artery Disease] : coronary artery disease [FreeTextEntry1] : pre-transplant cardiac evaluation

## 2019-06-18 NOTE — PHYSICAL EXAM
[General Appearance - Well Developed] : well developed [Normal Appearance] : normal appearance [Well Groomed] : well groomed [General Appearance - Well Nourished] : well nourished [No Deformities] : no deformities [General Appearance - In No Acute Distress] : no acute distress [Normal Conjunctiva] : the conjunctiva exhibited no abnormalities [Eyelids - No Xanthelasma] : the eyelids demonstrated no xanthelasmas [Normal Oral Mucosa] : normal oral mucosa [No Oral Pallor] : no oral pallor [No Oral Cyanosis] : no oral cyanosis [Respiration, Rhythm And Depth] : normal respiratory rhythm and effort [Exaggerated Use Of Accessory Muscles For Inspiration] : no accessory muscle use [Auscultation Breath Sounds / Voice Sounds] : lungs were clear to auscultation bilaterally [Heart Rate And Rhythm] : heart rate and rhythm were normal [Heart Sounds] : normal S1 and S2 [Murmurs] : no murmurs present [Edema] : no peripheral edema present [Abdomen Soft] : soft [Abdomen Tenderness] : non-tender [Abnormal Walk] : normal gait [Gait - Sufficient For Exercise Testing] : the gait was sufficient for exercise testing [Cyanosis, Localized] : no localized cyanosis [Skin Color & Pigmentation] : normal skin color and pigmentation [] : no rash [No Venous Stasis] : no venous stasis [Oriented To Time, Place, And Person] : oriented to person, place, and time [No Anxiety] : not feeling anxious [FreeTextEntry1] : right chest dialysis catheter in place

## 2019-06-18 NOTE — HISTORY OF PRESENT ILLNESS
[FreeTextEntry1] : Doing okay. Denies chest pain, shortness of breath or palpitations. Recently started on hemodialysis. Feels tired/fatigue after dialysis sessions.

## 2019-06-18 NOTE — PHYSICAL EXAM
[General Appearance - Alert] : alert [General Appearance - In No Acute Distress] : in no acute distress [Extraocular Movements] : extraocular movements were intact [Sclera] : the sclera and conjunctiva were normal [Auscultation Breath Sounds / Voice Sounds] : lungs were clear to auscultation bilaterally [Jugular Venous Distention Increased] : there was no jugular-venous distention [Heart Sounds Pericardial Friction Rub] : no pericardial rub [Heart Sounds Gallop] : no gallops [Edema] : there was no peripheral edema [Abdomen Soft] : soft [Full Pulse] : the pedal pulses are present [Cervical Lymph Nodes Enlarged Posterior Bilaterally] : posterior cervical [Abdomen Tenderness] : non-tender [Supraclavicular Lymph Nodes Enlarged Bilaterally] : supraclavicular [Axillary Lymph Nodes Enlarged Bilaterally] : axillary [Cervical Lymph Nodes Enlarged Anterior Bilaterally] : anterior cervical [] : no rash [Abnormal Walk] : normal gait [No Focal Deficits] : no focal deficits [Oriented To Time, Place, And Person] : oriented to person, place, and time [Motor Exam] : the motor exam was normal [Affect] : the affect was normal [Mood] : the mood was normal [Impaired Insight] : insight and judgment were intact [FreeTextEntry1] : + right carotid bruit, right IJ permacath

## 2019-06-18 NOTE — REASON FOR VISIT
[Family Member] : family member [Follow-Up] : a follow-up visit [FreeTextEntry1] : Pre kidney transplant follow up evaluation for

## 2019-06-18 NOTE — HISTORY OF PRESENT ILLNESS
[FreeTextEntry1] : 63 years old  male, living in  since .\par Patient has known CKD (), on follow up with Dr. Cardenas is here for f/u.  He recently started HD.  \par He has known DM () On insulin (); HTN ().  Pt uses insulin novolog 3 units with meals.  HE has retinopathy.  Pt denies neuropathy, no foot ulcers or infections.  Can walk 2 blocks, feels dizzy after dialysis.  \par \par Has had CAD-  stent in . Done at [facility] ([facility]) then CARLA to LAD this month.  Pt was asymptomatic at the time.  Cath done in preparation for transplant.  \par Echocardiogram 2018 - EF 65%, borderline pulm HNT, mild MR, mild-mod TR\par Colonoscopy \par Renal sono/ duplex  2019\par CXR 2019 - clear\par \par Past surgeries:None\par Non smoker.\par Fam: Parents are  . Father - at 80 years, tongue cancer was a smoker.  Mother- at 71, had accident while crossing road at [facility] near [place] Siblings- 2 brothers and one sister.\par Children: 2 sons, One son is cardiology fellow at [employer] . Another is nursing  Chris Castelan.\par No family history of kidney disease\par \par Functional/employment status:Working full time as , 6 days a week. 5 am to 1:30 PM. 3 pm to 2:30 Am.\par \par Prior Studies:\par Cardiology: Noted stress test small reversible defect basal anterior and basal anteroseptal, preserved EF; cath - minor luminal irregularities.\par Cancer Screen:Colonoscopy , PSA 2017- ok\par Imaging:\par Consultants:Berenice Bose, Dr. He, Dr. Cardenas. Previously [facility] Bullock, [facility].\par Primary MD:Pedro Corral\par \par Prior Transplants: None\par \par \par \par

## 2019-06-18 NOTE — DISCUSSION/SUMMARY
[Coronary Artery Disease] : coronary artery disease [Stable] : stable [Hypertension] : hypertension [FreeTextEntry1] : \par Currently stable from a cardiovascular standpoint. Normotensive. Euvolemic. Stable CAD (recent proximal LAD stent). No ischemic or CHF symptoms. ECG from today reviewed. Continue current medications including aspirin and ticagrelor. At this time, patient is considered an acceptable risk from a cardiac standpoint for renal transplant. Would consider switching antiplatelet therapy to clopidogrel at time of transplant. Would hold off on renal transplant for 4-6 weeks post-PCI. Regular follow up with primary cardiologist is advised.

## 2019-06-21 ENCOUNTER — OTHER (OUTPATIENT)
Age: 64
End: 2019-06-21

## 2019-06-21 LAB — ISOAGGLUTININ TITER, ANTI-A: 32

## 2019-06-30 ENCOUNTER — FORM ENCOUNTER (OUTPATIENT)
Age: 64
End: 2019-06-30

## 2019-07-01 ENCOUNTER — APPOINTMENT (OUTPATIENT)
Dept: ULTRASOUND IMAGING | Facility: CLINIC | Age: 64
End: 2019-07-01
Payer: COMMERCIAL

## 2019-07-01 ENCOUNTER — OUTPATIENT (OUTPATIENT)
Dept: OUTPATIENT SERVICES | Facility: HOSPITAL | Age: 64
LOS: 1 days | End: 2019-07-01
Payer: COMMERCIAL

## 2019-07-01 DIAGNOSIS — Z76.82 AWAITING ORGAN TRANSPLANT STATUS: ICD-10-CM

## 2019-07-01 PROCEDURE — 93880 EXTRACRANIAL BILAT STUDY: CPT

## 2019-07-01 PROCEDURE — 93880 EXTRACRANIAL BILAT STUDY: CPT | Mod: 26

## 2019-07-23 ENCOUNTER — OTHER (OUTPATIENT)
Age: 64
End: 2019-07-23

## 2019-08-13 ENCOUNTER — APPOINTMENT (OUTPATIENT)
Dept: VASCULAR SURGERY | Facility: CLINIC | Age: 64
End: 2019-08-13
Payer: COMMERCIAL

## 2019-08-13 VITALS
HEIGHT: 70 IN | DIASTOLIC BLOOD PRESSURE: 80 MMHG | SYSTOLIC BLOOD PRESSURE: 175 MMHG | HEART RATE: 60 BPM | WEIGHT: 156 LBS | BODY MASS INDEX: 22.33 KG/M2 | TEMPERATURE: 98.2 F

## 2019-08-13 PROCEDURE — 99202 OFFICE O/P NEW SF 15 MIN: CPT

## 2019-08-13 NOTE — HISTORY OF PRESENT ILLNESS
[FreeTextEntry1] : Had   Coronary stent  in June 6th  He is  on Brelinta  Never  had  TIA or CVA  No claudication  symptoms  Has  tests  done  already

## 2019-08-13 NOTE — ASSESSMENT
[FreeTextEntry1] : Carotid  US  shows  moderate  50 % stenosis  right  side  Excellent  pulses  in the  legs  and feet   there is  no contraindication  for transplant  No need  for  carotid intervention  for this  Stable  asymptomatic  moderate  stenosis   Return if  necessary Has  a tunneled catheter for  HD   [Carotid Endarterectomy] : carotid endarterectomy

## 2019-08-13 NOTE — PHYSICAL EXAM
[Right Carotid Bruit] : no bruit heard over the right carotid [Left Carotid Bruit] : no bruit heard over the left carotid [Right Femoral Bruit] : no bruit heard over the right femoral artery [Left Femoral Bruit] : no bruit heard over the left femoral artery [2+] : left 2+ [Ankle Swelling (On Exam)] : not present [Varicose Veins Of Lower Extremities] : not present [] : not present

## 2019-08-13 NOTE — REASON FOR VISIT
[Consultation] : a consultation visit [Family Member] : family member [FreeTextEntry1] : clearance  for  renal transplant

## 2019-09-11 ENCOUNTER — APPOINTMENT (OUTPATIENT)
Dept: CARDIOLOGY | Facility: CLINIC | Age: 64
End: 2019-09-11
Payer: MEDICARE

## 2019-09-11 ENCOUNTER — NON-APPOINTMENT (OUTPATIENT)
Age: 64
End: 2019-09-11

## 2019-09-11 VITALS
WEIGHT: 156 LBS | OXYGEN SATURATION: 97 % | HEIGHT: 70 IN | SYSTOLIC BLOOD PRESSURE: 183 MMHG | BODY MASS INDEX: 22.33 KG/M2 | RESPIRATION RATE: 12 BRPM | DIASTOLIC BLOOD PRESSURE: 83 MMHG | TEMPERATURE: 98 F | HEART RATE: 60 BPM

## 2019-09-11 VITALS — DIASTOLIC BLOOD PRESSURE: 84 MMHG | SYSTOLIC BLOOD PRESSURE: 144 MMHG

## 2019-09-11 PROCEDURE — 99214 OFFICE O/P EST MOD 30 MIN: CPT

## 2019-09-11 RX ORDER — TICAGRELOR 90 MG/1
90 TABLET ORAL
Qty: 180 | Refills: 0 | Status: DISCONTINUED | COMMUNITY
Start: 2019-06-12 | End: 2019-09-11

## 2019-09-11 NOTE — PHYSICAL EXAM
[General Appearance - Well Developed] : well developed [Normal Appearance] : normal appearance [Well Groomed] : well groomed [General Appearance - Well Nourished] : well nourished [No Deformities] : no deformities [General Appearance - In No Acute Distress] : no acute distress [Normal Conjunctiva] : the conjunctiva exhibited no abnormalities [Eyelids - No Xanthelasma] : the eyelids demonstrated no xanthelasmas [Normal Oral Mucosa] : normal oral mucosa [No Oral Pallor] : no oral pallor [No Oral Cyanosis] : no oral cyanosis [Normal Jugular Venous A Waves Present] : normal jugular venous A waves present [Normal Jugular Venous V Waves Present] : normal jugular venous V waves present [No Jugular Venous Rodriguez A Waves] : no jugular venous rodriguez A waves [Exaggerated Use Of Accessory Muscles For Inspiration] : no accessory muscle use [Respiration, Rhythm And Depth] : normal respiratory rhythm and effort [Auscultation Breath Sounds / Voice Sounds] : lungs were clear to auscultation bilaterally [Heart Sounds] : normal S1 and S2 [Heart Rate And Rhythm] : heart rate and rhythm were normal [Murmurs] : no murmurs present [Abdomen Soft] : soft [Abdomen Tenderness] : non-tender [Abdomen Mass (___ Cm)] : no abdominal mass palpated [Abnormal Walk] : normal gait [Gait - Sufficient For Exercise Testing] : the gait was sufficient for exercise testing [Cyanosis, Localized] : no localized cyanosis [Nail Clubbing] : no clubbing of the fingernails [Petechial Hemorrhages (___cm)] : no petechial hemorrhages [Skin Color & Pigmentation] : normal skin color and pigmentation [] : no rash [No Venous Stasis] : no venous stasis [Skin Lesions] : no skin lesions [No Skin Ulcers] : no skin ulcer [No Xanthoma] : no  xanthoma was observed [Affect] : the affect was normal [Oriented To Time, Place, And Person] : oriented to person, place, and time [Mood] : the mood was normal [No Anxiety] : not feeling anxious

## 2019-09-11 NOTE — DISCUSSION/SUMMARY
[___ Month(s)] : [unfilled] month(s) [FreeTextEntry1] : The patient is a 63-year-old gentleman diabetes mellitus, hypertension, hyperlipidemia, hypothyroidism, ESRD, coronary artery disease who is improving\par #1 CAD- s/p LAD stent +IFR, on DAPT, no symptoms, change from brilinta to plavix pretransplant\par #2 Htn- symptomatic hypotension, decrease hydralazine to bid\par #3 ESRD- on dialysis, close monitoring by Dr. Cardenas, transplant pending\par #5 DM- on insulin, good control

## 2019-09-11 NOTE — HISTORY OF PRESENT ILLNESS
[FreeTextEntry1] : Srinivasan has been feeling well since last visit off hydralazine. BP high at beginning but by the end it is normal. Nondialysis days in the 130s. Dialysis M,W,F.

## 2019-10-30 ENCOUNTER — APPOINTMENT (OUTPATIENT)
Dept: NEPHROLOGY | Facility: CLINIC | Age: 64
End: 2019-10-30
Payer: MEDICARE

## 2019-10-30 VITALS
BODY MASS INDEX: 22.41 KG/M2 | HEART RATE: 69 BPM | OXYGEN SATURATION: 99 % | WEIGHT: 156.53 LBS | DIASTOLIC BLOOD PRESSURE: 94 MMHG | HEIGHT: 70 IN | SYSTOLIC BLOOD PRESSURE: 185 MMHG

## 2019-10-30 PROCEDURE — 99213 OFFICE O/P EST LOW 20 MIN: CPT

## 2019-10-30 RX ORDER — CALCIUM ACETATE 667 MG
667 TABLET ORAL
Qty: 270 | Refills: 3 | Status: DISCONTINUED | COMMUNITY
Start: 2019-04-18 | End: 2019-10-30

## 2019-10-30 NOTE — ASSESSMENT
[FreeTextEntry1] : Mr. DUNBAR is a 63 year old male with ESRD from DMII/HTN\par \par 1. ESRD: on HD and being managed by his dialysis nephrologist till transfer here. Application to be sent by his unit. \par \par 2. Anemia: stable and no symptoms now and off EPO now.\par \par 3. OFF binders\par \par 4. HTN: volume mediated, all meds updated\par Met with SW to see how to transfer here as per his request. Explained it has to be initiated at his HD unit.\par \par

## 2019-10-30 NOTE — HISTORY OF PRESENT ILLNESS
[FreeTextEntry1] : Mr. DUNBAR is a 5 year old male with CKD stage V  now ESRD now started few months ago at [facility]. Pt reports is feeling well, able to sleep at night, not sob, not having LE edema, good appetite, no nausea/ vomiting, no tremors, headaches,  following with transplant team, and planned for possible transplant in 2020. For now, wants to switch over to dialysis unit with me as nephrologist. \par \par

## 2019-12-13 ENCOUNTER — INPATIENT (INPATIENT)
Facility: HOSPITAL | Age: 64
LOS: 5 days | Discharge: ANOTHER HOSPITAL | End: 2019-12-19
Attending: INTERNAL MEDICINE | Admitting: INTERNAL MEDICINE
Payer: MEDICARE

## 2019-12-13 VITALS
OXYGEN SATURATION: 100 % | DIASTOLIC BLOOD PRESSURE: 114 MMHG | TEMPERATURE: 98 F | RESPIRATION RATE: 19 BRPM | SYSTOLIC BLOOD PRESSURE: 249 MMHG | HEART RATE: 78 BPM | WEIGHT: 164.91 LBS | HEIGHT: 66 IN

## 2019-12-13 LAB
ALBUMIN SERPL ELPH-MCNC: 3.7 G/DL — SIGNIFICANT CHANGE UP (ref 3.3–5)
ALP SERPL-CCNC: 146 U/L — HIGH (ref 40–120)
ALT FLD-CCNC: 16 U/L — SIGNIFICANT CHANGE UP (ref 12–78)
AMMONIA BLD-MCNC: 25 UMOL/L — SIGNIFICANT CHANGE UP (ref 11–32)
ANION GAP SERPL CALC-SCNC: 9 MMOL/L — SIGNIFICANT CHANGE UP (ref 5–17)
APAP SERPL-MCNC: <10 UG/ML — SIGNIFICANT CHANGE UP (ref 10–30)
APTT BLD: 31.5 SEC — SIGNIFICANT CHANGE UP (ref 27.5–36.3)
AST SERPL-CCNC: 21 U/L — SIGNIFICANT CHANGE UP (ref 15–37)
BASE EXCESS BLDA CALC-SCNC: 2.8 MMOL/L — HIGH (ref -2–2)
BASOPHILS # BLD AUTO: 0.02 K/UL — SIGNIFICANT CHANGE UP (ref 0–0.2)
BASOPHILS NFR BLD AUTO: 0.2 % — SIGNIFICANT CHANGE UP (ref 0–2)
BILIRUB SERPL-MCNC: 0.3 MG/DL — SIGNIFICANT CHANGE UP (ref 0.2–1.2)
BLOOD GAS COMMENTS: SIGNIFICANT CHANGE UP
BLOOD GAS COMMENTS: SIGNIFICANT CHANGE UP
BLOOD GAS SOURCE: SIGNIFICANT CHANGE UP
BUN SERPL-MCNC: 22 MG/DL — SIGNIFICANT CHANGE UP (ref 7–23)
CALCIUM SERPL-MCNC: 10.3 MG/DL — HIGH (ref 8.5–10.1)
CHLORIDE SERPL-SCNC: 98 MMOL/L — SIGNIFICANT CHANGE UP (ref 96–108)
CK MB CFR SERPL CALC: 1.8 NG/ML — SIGNIFICANT CHANGE UP (ref 0.5–3.6)
CO2 SERPL-SCNC: 27 MMOL/L — SIGNIFICANT CHANGE UP (ref 22–31)
CREAT SERPL-MCNC: 3.22 MG/DL — HIGH (ref 0.5–1.3)
EOSINOPHIL # BLD AUTO: 0.11 K/UL — SIGNIFICANT CHANGE UP (ref 0–0.5)
EOSINOPHIL NFR BLD AUTO: 1.3 % — SIGNIFICANT CHANGE UP (ref 0–6)
ETHANOL SERPL-MCNC: <10 MG/DL — SIGNIFICANT CHANGE UP (ref 0–10)
GLUCOSE SERPL-MCNC: 150 MG/DL — HIGH (ref 70–99)
HCO3 BLDA-SCNC: 26 MMOL/L — SIGNIFICANT CHANGE UP (ref 21–29)
HCT VFR BLD CALC: 35.5 % — LOW (ref 39–50)
HGB BLD-MCNC: 11.7 G/DL — LOW (ref 13–17)
HOROWITZ INDEX BLDA+IHG-RTO: 0.36 — SIGNIFICANT CHANGE UP
IMM GRANULOCYTES NFR BLD AUTO: 1.1 % — SIGNIFICANT CHANGE UP (ref 0–1.5)
INR BLD: 1.08 RATIO — SIGNIFICANT CHANGE UP (ref 0.88–1.16)
LACTATE SERPL-SCNC: 0.9 MMOL/L — SIGNIFICANT CHANGE UP (ref 0.7–2)
LIDOCAIN IGE QN: 190 U/L — SIGNIFICANT CHANGE UP (ref 73–393)
LYMPHOCYTES # BLD AUTO: 2.35 K/UL — SIGNIFICANT CHANGE UP (ref 1–3.3)
LYMPHOCYTES # BLD AUTO: 26.8 % — SIGNIFICANT CHANGE UP (ref 13–44)
MCHC RBC-ENTMCNC: 28.9 PG — SIGNIFICANT CHANGE UP (ref 27–34)
MCHC RBC-ENTMCNC: 33 GM/DL — SIGNIFICANT CHANGE UP (ref 32–36)
MCV RBC AUTO: 87.7 FL — SIGNIFICANT CHANGE UP (ref 80–100)
MONOCYTES # BLD AUTO: 0.48 K/UL — SIGNIFICANT CHANGE UP (ref 0–0.9)
MONOCYTES NFR BLD AUTO: 5.5 % — SIGNIFICANT CHANGE UP (ref 2–14)
NEUTROPHILS # BLD AUTO: 5.71 K/UL — SIGNIFICANT CHANGE UP (ref 1.8–7.4)
NEUTROPHILS NFR BLD AUTO: 65.1 % — SIGNIFICANT CHANGE UP (ref 43–77)
NRBC # BLD: 0 /100 WBCS — SIGNIFICANT CHANGE UP (ref 0–0)
PCO2 BLDA: 38 MMHG — SIGNIFICANT CHANGE UP (ref 32–46)
PH BLD: 7.46 — HIGH (ref 7.35–7.45)
PLATELET # BLD AUTO: 294 K/UL — SIGNIFICANT CHANGE UP (ref 150–400)
PO2 BLDA: 160 MMHG — HIGH (ref 74–108)
POTASSIUM SERPL-MCNC: 3.7 MMOL/L — SIGNIFICANT CHANGE UP (ref 3.5–5.3)
POTASSIUM SERPL-SCNC: 3.7 MMOL/L — SIGNIFICANT CHANGE UP (ref 3.5–5.3)
PROT SERPL-MCNC: 9.4 GM/DL — HIGH (ref 6–8.3)
PROTHROM AB SERPL-ACNC: 12.1 SEC — SIGNIFICANT CHANGE UP (ref 10–12.9)
RBC # BLD: 4.05 M/UL — LOW (ref 4.2–5.8)
RBC # FLD: 17.7 % — HIGH (ref 10.3–14.5)
SAO2 % BLDA: 99 % — HIGH (ref 92–96)
SODIUM SERPL-SCNC: 134 MMOL/L — LOW (ref 135–145)
TROPONIN I SERPL-MCNC: 0.04 NG/ML — SIGNIFICANT CHANGE UP (ref 0.01–0.04)
WBC # BLD: 8.77 K/UL — SIGNIFICANT CHANGE UP (ref 3.8–10.5)
WBC # FLD AUTO: 8.77 K/UL — SIGNIFICANT CHANGE UP (ref 3.8–10.5)

## 2019-12-13 PROCEDURE — G0426: CPT

## 2019-12-13 PROCEDURE — 99291 CRITICAL CARE FIRST HOUR: CPT

## 2019-12-13 PROCEDURE — 70450 CT HEAD/BRAIN W/O DYE: CPT | Mod: 26

## 2019-12-13 RX ORDER — NICARDIPINE HYDROCHLORIDE 30 MG/1
5 CAPSULE, EXTENDED RELEASE ORAL
Qty: 40 | Refills: 0 | Status: DISCONTINUED | OUTPATIENT
Start: 2019-12-13 | End: 2019-12-14

## 2019-12-13 RX ORDER — ALTEPLASE 100 MG
60.6 KIT INTRAVENOUS ONCE
Refills: 0 | Status: COMPLETED | OUTPATIENT
Start: 2019-12-13 | End: 2019-12-13

## 2019-12-13 RX ORDER — ALTEPLASE 100 MG
6.7 KIT INTRAVENOUS ONCE
Refills: 0 | Status: COMPLETED | OUTPATIENT
Start: 2019-12-13 | End: 2019-12-13

## 2019-12-13 RX ADMIN — NICARDIPINE HYDROCHLORIDE 25 MG/HR: 30 CAPSULE, EXTENDED RELEASE ORAL at 23:10

## 2019-12-13 NOTE — ED ADULT NURSE REASSESSMENT NOTE - NS ED NURSE REASSESS COMMENT FT1
Received pt disorientated , not responsive to tactile or verbal stimuli. Pt is Hypertensive and tachycardic. family states pt is usually A & O x 4 lst normal was 2100 . PT medicated per MAR will continue to monitor family at bedside.

## 2019-12-13 NOTE — ED PROVIDER NOTE - CLINICAL SUMMARY MEDICAL DECISION MAKING FREE TEXT BOX
63 yo M with HTN, AMS, r/o stroke/bleed, doubt sepsis, ACS, ischemia, intox  -basic labs, coags, etoh, ammonia, abg, trop, ckmb, blood cx, ua, cx, drug screen, lactate, asa/tylenol levels, CT brain stat, cxr, ekg, iv, monitor, start nicardipine drip for htn, rectal temp, enhanced obs/fall precautions, tucker, rectal temp  -f/u results, reeval  -f/u results, reeval

## 2019-12-13 NOTE — ED ADULT NURSE NOTE - OBJECTIVE STATEMENT
Per son PT BIBEMS due to syncopal episode at dialysis . Permacath noted to R anterior chest wall intact. IN Ed pt is unresponsive to tactile and verbal stimuli. having involutionary movements to head and LLE. dialysis days M/W/F

## 2019-12-13 NOTE — PROGRESS NOTE ADULT - ASSESSMENT
ASSESSMENT/PLAN: Possible metabolic process (HD dysequilibrium syndrome versus malignant hypertension/PRES), but cannot rule out stroke given known stroke risk factors and focality seen on initial ED evaluation    IV rTPA INCLUSION CRITERIA  [x] Symptoms suggestive of ischemic stroke that are deemed to be disabling, regardless of improvement   [x] Able to initiate treatment within [x] 3 hours [x] 4.5 hours of time last known well    IV rTPA CONTRAINDICATIONS  [x] None; brief review done with son, OSH ED to conduct more thorough review of exclusion criteria    RISKS/BENEFITS:  Current treatment recommendations for eligible patients with acute ischemic stroke have proven highly beneficial with acceptable risk. Complications related to intravenous r-tPA include symptomatic intracranial hemorrhage, major systemic hemorrhage and angioedema in approximately 6%, 2%, and 5% of patients, respectively. There is a 2.8% risk of intracerebral bleeding in patients treated in the 3-4.5 hour window (compared to 0.2% not treated with r-tPA) according to the ECASS III Study with no increase in mortality compared to placebo groups. Early Alteplase IV r-tPA is the standard of care for acute stroke patients. Rapid intervention is critical to successful treatment. There is low risk of hemorrhage in stroke mimics.     RECOMMENDATIONS:  [x] Administer IV rTPA  [x] Obtain vessel imaging to r/o large vessel occlusion that would necessitate thrombectomy (ie r/o vert/basilar thrombosis)  [x] Avoid excessive/rapid hypotension     Plan discussed with patient, patient's son, patient's wife, patient's bedside RN and ED physician Dr. Ordoñez. ASSESSMENT/PLAN: Possible metabolic process (HD dysequilibrium syndrome versus malignant hypertension/PRES), but cannot rule out stroke given known stroke risk factors and focality seen on initial ED evaluation    IV rTPA INCLUSION CRITERIA  [x] Symptoms suggestive of ischemic stroke that are deemed to be disabling, regardless of improvement   [x] Able to initiate treatment within [x] 3 hours [x] 4.5 hours of time last known well    IV rTPA CONTRAINDICATIONS  [x] None; brief review done with son, OSH ED to conduct more thorough review of exclusion criteria    RISKS/BENEFITS:  Current treatment recommendations for eligible patients with acute ischemic stroke have proven highly beneficial with acceptable risk. Complications related to intravenous r-tPA include symptomatic intracranial hemorrhage, major systemic hemorrhage and angioedema in approximately 6%, 2%, and 5% of patients, respectively. There is a 2.8% risk of intracerebral bleeding in patients treated in the 3-4.5 hour window (compared to 0.2% not treated with r-tPA) according to the ECASS III Study with no increase in mortality compared to placebo groups. Early Alteplase IV r-tPA is the standard of care for acute stroke patients. Rapid intervention is critical to successful treatment. There is low risk of hemorrhage in stroke mimics.     RECOMMENDATIONS:  [x] Administer IV rTPA  [x] Obtain vessel imaging to r/o large vessel occlusion that would necessitate thrombectomy (ie r/o vert/basilar thrombosis)  [x] Avoid excessive/rapid hypotension     Plan discussed with patient, patient's son, patient's wife, patient's bedside RN and ED physician Dr. Ordoñez.    Telestroke consult provided via two-way, real time video. 50 minutes.

## 2019-12-13 NOTE — ED PROVIDER NOTE - OBJECTIVE STATEMENT
63 yo M with acute AMS since dialysis today.  EMS was called directly to dialysis center ([facility]) after AMS was noted by nursing.  Pt. cannot give history.  Pt. was last known normal before dialysis, normally AAOx3, functional, communicative.  Pt. was noted to be hypertensive by EMS, at dialysis 2 L taken off.    ROS: negative for fever, cough, headache, chest pain, shortness of breath, abd pain, nausea, vomiting, diarrhea, rash, paresthesia, and weakness--all other systems reviewed are negative.   PMH: HTN, HLD, IDDM, ESRD dialysis dependent; Meds: See EMR for list; SH: Denies smoking/drinking/drug use 65 yo M with acute AMS since dialysis today.  EMS was called directly to dialysis center ([facility]) after AMS was noted by nursing.  Pt. cannot give history.  Pt. was last known normal right after completion of dialysis, then acutely decompensated.  Pt. is normally AAOx3, functional, communicative.  Pt. was noted to be hypertensive by EMS, at dialysis 2 L taken off.  Pt. attempts to mouth words with no sounds.    ROS: negative for fever, cough, headache, chest pain, shortness of breath, abd pain, nausea, vomiting, diarrhea, rash, paresthesia, and weakness--all other systems reviewed are negative.   PMH: HTN, HLD, IDDM, ESRD dialysis dependent; Meds: See EMR for list; SH: Denies smoking/drinking/drug use

## 2019-12-13 NOTE — ED PROVIDER NOTE - PHYSICAL EXAMINATION
Vitals: HTN at 149/114  Gen: Awake, lethargic, localizes pain, NAD, laying in stretcher  Head: ncat, perrla, eomi b/l  Neck: supple, no lymphadenopathy, no midline deviation  Heart: rrr, no m/r/g  Lungs: CTA b/l, no rales/ronchi/wheezes  Abd: soft, nontender, non-distended, no rebound or guarding  Ext: no clubbing/cyanosis/edema  Neuro: sensation and muscle strength intact b/l Vitals: HTN at 149/114  Gen: Awake, lethargic, localizes pain, NAD, laying in stretcher  Head: ncat, perrla, eomi b/l  Neck: supple, no lymphadenopathy, no midline deviation  Heart: rrr, no m/r/g  Lungs: CTA b/l, no rales/ronchi/wheezes  Abd: soft, nontender, non-distended, no rebound or guarding  Ext: no clubbing/cyanosis/edema  Neuro: L guaze deviation, mouthing words, no effective speech, + weak  strength on L, some movement of LLE, no movement on R side of body

## 2019-12-13 NOTE — ED ADULT NURSE NOTE - CHIEF COMPLAINT QUOTE
As per EMS pt was in dialysis, completed treatment and then went AMS and hypertensive at 2200. Pt arrived in triage eyes opening spontaneously, non verbal, unable to follow commands.

## 2019-12-13 NOTE — PROGRESS NOTE ADULT - SUBJECTIVE AND OBJECTIVE BOX
HISTORY OF PRESENT ILLNESS:  Mr. Srinivasan Castelan (MRN [id number]) is a 64 year-old [place]  man with stroke risk factors of hypertension, diabetes mellitus, hyperlipidemia, coronary artery disease status post stent in June 2019 as well as end stage renal disease on hemodialysis who presented to [facility] with altered mental status after completing dialysis on 12/13/19. He was last known normal well at 9PM when he called his wife whilst at dialysis. After dialysis, the staff called his son to say that he had "passed out" and that 911 had been called.   Of note, son reports that patient typically loses his voice and is fatigued after dialysis, especially on Fridays when "more fluid is removed." However, patient has never been as lethargic as he currently is post-HD.     OUTSIDE HOSPITAL COURSE:  At [facility], Mr. Castelan was found to be lethargic with left gaze deviation and right hemiplegia. His initial blood pressure was in the SBP 240s, for which he was started on nicardipine drip but his blood pressure dropped quickly to the 140s, so nicardipine was titrated down. CT head was performed and did not show any hemorrhage. Telestroke was activated.     ALLERGIES:  No Known Allergies    VITALS/DATA/ORDERS: [x] Reviewed  Vital Signs Last 24 Hrs  T(C): 36.4 (13 Dec 2019 22:48), Max: 36.4 (13 Dec 2019 22:48)  T(F): 97.6 (13 Dec 2019 22:48), Max: 97.6 (13 Dec 2019 22:48)  HR: 73 (13 Dec 2019 23:48) (73 - 78)  BP: 137/62 (13 Dec 2019 23:48) (137/62 - 249/114)  RR: 20 (13 Dec 2019 23:48) (19 - 20)  SpO2: 100% (13 Dec 2019 23:48) (100% - 100%)                        11.7   8.77  )-----------( 294      ( 13 Dec 2019 23:13 )             35.5     12-13    134<L>  |  98  |  22  ----------------------------<  150<H>  3.7   |  27  |  3.22<H>    Ca    10.3<H>      13 Dec 2019 23:13    TPro  9.4<H>  /  Alb  3.7  /  TBili  0.3  /  DBili  x   /  AST  21  /  ALT  16  /  AlkPhos  146<H>  12-13    PT/INR - ( 13 Dec 2019 23:13 )   PT: 12.1 sec;   INR: 1.08 ratio         PTT - ( 13 Dec 2019 23:13 )  PTT:31.5 sec  CAPILLARY BLOOD GLUCOSE  160 (13 Dec 2019 23:11)    CT Head: no hemorrhage    EXAMINATION: Assisted by (TAO Sanchez)    Lethargic, drifts back to sleep without stimulation but periodically appears more alert, able to state his name, his wife's name, that he is in the hospital because he "is not a conscious person", left gaze preference, smiles symmetrically, hypophonic with minimal dysarthria, minimal spontaneous verbal output, but can name common objects and read a few words before drifting off to sleep, makes to effort to hold limbs up, does not grimace to noxious stimulus     NIHSS: 20    1A: Level of consciousness       0= Alert; keenly responsive       +1= Arouses to minor stimulation       +2= Requires repeated stimulation to arouse       +2= Movements to pain       +3= Postures or unresponsive  1B: Ask month and age       0= Both questions right       +1= 1 question right       +2= 0 questions right       +1= Dysarthric/intubated/trauma/language barrier       +2= Aphasic  1C: "Blink eyes" and "Squeeze Hands"       0= Performs both       +1= Performs 1 task       +2= Performs 0 tasks    2: Horizontal EOMs       0= Normal       +1= Partial gaze palsy: can be overcome       +1= Partial gaze palsy: corrects w/ oculocephalic reflex        +2= Forzed gaze palsy: cannot be overcome    3: Visual fields       0= No visual loss       +1= Partial hemianopia       +2= Complete hemianopia       +3= Patient is b/l blind       +3= B/l hemianopia    4: Facial palsy (use grimace if obtunded)       0= Normal symmetry       +1= Minor paralysis (flat NLF, smile asymmetry)       +2= Partial paralysis ( lower face)       +3= Unilateral complete paralysis (upper/lower face)       +3= B/l complete paralysis (upper/lower face)    5A: Left arm motor drift (count out loud and use fingers to show count)       0= No drift x 10 seconds       +1= Drift but doesn't hit bed       +2= Drift, hits bed       +2= Some effort against gravity       +3= No effort against gravity       +4= No movement       0= Amputation/joint fusion  5B: Right arm motor drift       0= No drift x 10 seconds       +1= Drift but doesn't hit bed       +2= Drift, hits bed       +2= Some effort against gravity       +3= No effort against gravity       +4= No movement       0= Amputation/joint fusion    6A: Left leg motor drift       0= No drift x 10 seconds       +1= Drift but doesn't hit bed       +2= Drift, hits bed       +2= Some effort against gravity       +3= No effort against gravity       +4= No movement       0= Amputation/joint fusion    6B: Right leg motor drift       0= No drift x 10 seconds       +1= Drift but doesn't hit bed       +2= Drift, hits bed       +2= Some effort against gravity       +3= No effort against gravity       +4= No movement       0= Amputation/joint fusion    7: Limb ataxia (FNF/heel-shin)       0= No ataxia       +1= Ataxia in 1 limb       +2= Ataxia in 2 limbs       0= Does not understand       0= Paralyzed       0= Amputation/joint fusion    8: Sensation       0= Normal, no sensory loss       +1= mild-moderate loss: less sharp/more dull       +1= mild-moderate loss: can sense being touched       +2= Complete loss: cannot sense being touched at all -> does not grimace to deep noxious stimulus       +2= No response and quadriplegic       +2= Coma/unresponsive    9: Language/aphasia- describe the scene (on salena); name the items; read the sentences (on salena)       0= Normal, no aphasia       +1= mild-moderate aphaisa: some obvious changes without significant limitation       +2= Severe aphasia: fragmentary expression, inference needed, cannot identify materials       +3= Mute/global aphasia: no usable speech/auditory comprehension       +3= coma/unresponsive    10: Dysarthria- read the words       0= Normal       +1= mild-moderate dysarthria: slurring but can be understood       +2= Severe dysarthria: unintelligible slurring or out of proportion to dysphasia       +2= Mute/anarthric        0= Intubated/unable to test    11: Extinction/inattention       0= No abnormality       +1= visual/tactile/auditory/spatial/personal inattention       +1= Extinction to b/l simultaneous stimulation       +2= Profound kris-inattention (e.g. does not recognize own hand)       +2= extinction to > 1 modality HISTORY OF PRESENT ILLNESS:  Mr. Srinivasan Castelan (MRN [id number]) is a 64 year-old [place]  man with stroke risk factors of hypertension, diabetes mellitus, hyperlipidemia, coronary artery disease status post stent in June 2019 as well as end stage renal disease on hemodialysis who presented to [facility] with altered mental status after completing dialysis on 12/13/19. He was last known normal well at 9PM when he called his wife whilst at dialysis. After dialysis, the staff called his son to say that he had "passed out" and that 911 had been called.   Of note, son reports that patient typically loses his voice and is fatigued after dialysis, especially on Fridays when "more fluid is removed." However, patient has never been as lethargic as he currently is post-HD.     OUTSIDE HOSPITAL COURSE:  At [facility], Mr. Castelan was found to be lethargic with left gaze deviation and right hemiplegia. His initial blood pressure was in the SBP 240s, for which he was started on nicardipine drip but his blood pressure dropped quickly to the 140s, so nicardipine was titrated down. CT head was performed and did not show any hemorrhage. Telestroke was activated.     ALLERGIES:  No Known Allergies    VITALS/DATA/ORDERS: [x] Reviewed  Vital Signs Last 24 Hrs  T(C): 36.4 (13 Dec 2019 22:48), Max: 36.4 (13 Dec 2019 22:48)  T(F): 97.6 (13 Dec 2019 22:48), Max: 97.6 (13 Dec 2019 22:48)  HR: 73 (13 Dec 2019 23:48) (73 - 78)  BP: 137/62 (13 Dec 2019 23:48) (137/62 - 249/114)  RR: 20 (13 Dec 2019 23:48) (19 - 20)  SpO2: 100% (13 Dec 2019 23:48) (100% - 100%)                        11.7   8.77  )-----------( 294      ( 13 Dec 2019 23:13 )             35.5     12-13    134<L>  |  98  |  22  ----------------------------<  150<H>  3.7   |  27  |  3.22<H>    Ca    10.3<H>      13 Dec 2019 23:13    TPro  9.4<H>  /  Alb  3.7  /  TBili  0.3  /  DBili  x   /  AST  21  /  ALT  16  /  AlkPhos  146<H>  12-13    PT/INR - ( 13 Dec 2019 23:13 )   PT: 12.1 sec;   INR: 1.08 ratio         PTT - ( 13 Dec 2019 23:13 )  PTT:31.5 sec  CAPILLARY BLOOD GLUCOSE  160 (13 Dec 2019 23:11)    CT Head: no hemorrhage    EXAMINATION: Assisted by TAO Sanchez    Lethargic, drifts back to sleep without stimulation but periodically appears more alert, able to state his name, his wife's name, that he is in the hospital because he "is not a conscious person", left gaze preference, smiles symmetrically, hypophonic with minimal dysarthria, minimal spontaneous verbal output, but can name common objects and read a few words before drifting off to sleep, makes to effort to hold limbs up, does not grimace to noxious stimulus     NIHSS: 20    1A: Level of consciousness       0= Alert; keenly responsive       +1= Arouses to minor stimulation       +2= Requires repeated stimulation to arouse       +2= Movements to pain       +3= Postures or unresponsive  1B: Ask month and age       0= Both questions right       +1= 1 question right       +2= 0 questions right       +1= Dysarthric/intubated/trauma/language barrier       +2= Aphasic  1C: "Blink eyes" and "Squeeze Hands"       0= Performs both       +1= Performs 1 task       +2= Performs 0 tasks    2: Horizontal EOMs       0= Normal       +1= Partial gaze palsy: can be overcome       +1= Partial gaze palsy: corrects w/ oculocephalic reflex        +2= Forzed gaze palsy: cannot be overcome    3: Visual fields       0= No visual loss       +1= Partial hemianopia       +2= Complete hemianopia       +3= Patient is b/l blind       +3= B/l hemianopia    4: Facial palsy (use grimace if obtunded)       0= Normal symmetry       +1= Minor paralysis (flat NLF, smile asymmetry)       +2= Partial paralysis ( lower face)       +3= Unilateral complete paralysis (upper/lower face)       +3= B/l complete paralysis (upper/lower face)    5A: Left arm motor drift (count out loud and use fingers to show count)       0= No drift x 10 seconds       +1= Drift but doesn't hit bed       +2= Drift, hits bed       +2= Some effort against gravity       +3= No effort against gravity       +4= No movement       0= Amputation/joint fusion  5B: Right arm motor drift       0= No drift x 10 seconds       +1= Drift but doesn't hit bed       +2= Drift, hits bed       +2= Some effort against gravity       +3= No effort against gravity       +4= No movement       0= Amputation/joint fusion    6A: Left leg motor drift       0= No drift x 10 seconds       +1= Drift but doesn't hit bed       +2= Drift, hits bed       +2= Some effort against gravity       +3= No effort against gravity       +4= No movement       0= Amputation/joint fusion    6B: Right leg motor drift       0= No drift x 10 seconds       +1= Drift but doesn't hit bed       +2= Drift, hits bed       +2= Some effort against gravity       +3= No effort against gravity       +4= No movement       0= Amputation/joint fusion    7: Limb ataxia (FNF/heel-shin)       0= No ataxia       +1= Ataxia in 1 limb       +2= Ataxia in 2 limbs       0= Does not understand       0= Paralyzed       0= Amputation/joint fusion    8: Sensation       0= Normal, no sensory loss       +1= mild-moderate loss: less sharp/more dull       +1= mild-moderate loss: can sense being touched       +2= Complete loss: cannot sense being touched at all -> does not grimace to deep noxious stimulus       +2= No response and quadriplegic       +2= Coma/unresponsive    9: Language/aphasia- describe the scene (on salena); name the items; read the sentences (on salena)       0= Normal, no aphasia       +1= mild-moderate aphaisa: some obvious changes without significant limitation       +2= Severe aphasia: fragmentary expression, inference needed, cannot identify materials       +3= Mute/global aphasia: no usable speech/auditory comprehension       +3= coma/unresponsive    10: Dysarthria- read the words       0= Normal       +1= mild-moderate dysarthria: slurring but can be understood       +2= Severe dysarthria: unintelligible slurring or out of proportion to dysphasia       +2= Mute/anarthric        0= Intubated/unable to test    11: Extinction/inattention       0= No abnormality       +1= visual/tactile/auditory/spatial/personal inattention       +1= Extinction to b/l simultaneous stimulation       +2= Profound kris-inattention (e.g. does not recognize own hand)       +2= extinction to > 1 modality

## 2019-12-13 NOTE — ED PROVIDER NOTE - CARE PLAN
Principal Discharge DX:	Delirium  Secondary Diagnosis:	Hypertensive emergency Principal Discharge DX:	Delirium  Secondary Diagnosis:	Hypertensive emergency  Secondary Diagnosis:	Ischemic stroke

## 2019-12-13 NOTE — ED ADULT NURSE NOTE - NS_BELOGIGINS_SECURE_ED_ALL_FT
1:20 pt noted to bite tongue MD made aware. Miranda intact. pt observed moving all 4 extremities involuntarily.

## 2019-12-13 NOTE — ED ADULT TRIAGE NOTE - CHIEF COMPLAINT QUOTE
As per EMS pt was in dialysis, completed treatment and then went AMS and hypertensive at 2200 As per EMS pt was in dialysis, completed treatment and then went AMS and hypertensive at 2200. Pt arrived in triage eyes opening spontaneously, non verbal, unable to follow commands.

## 2019-12-13 NOTE — ED PROVIDER NOTE - INTERPRETATION
EKG performed in ED, NSR at 84, No acute ischemic changes, JARRETT in AVr, diffuse depressions also noted, (no change in patient status)

## 2019-12-13 NOTE — ED PROVIDER NOTE - PROGRESS NOTE DETAILS
stroke neurologist eval at bedside appreciated, agrees with TPA now, then CTA  calling ICU now for eval  pressure improved from presentation after nicardipine pt. accepted to our ICU for further care, JARRETT noted on EKG, just started, noticed by monitor tech  called transfer center, Dr. Alexander agrees with trending toponins/EKGs for now, will leave transfer decision up to ICU for now, ICU PA aware

## 2019-12-14 DIAGNOSIS — I16.1 HYPERTENSIVE EMERGENCY: ICD-10-CM

## 2019-12-14 DIAGNOSIS — N18.4 CHRONIC KIDNEY DISEASE, STAGE 4 (SEVERE): ICD-10-CM

## 2019-12-14 DIAGNOSIS — I63.39 CEREBRAL INFARCTION DUE TO THROMBOSIS OF OTHER CEREBRAL ARTERY: ICD-10-CM

## 2019-12-14 DIAGNOSIS — Z92.82 STATUS POST ADMINISTRATION OF TPA (RTPA) IN A DIFFERENT FACILITY WITHIN THE LAST 24 HOURS PRIOR TO ADMISSION TO CURRENT FACILITY: ICD-10-CM

## 2019-12-14 DIAGNOSIS — E78.5 HYPERLIPIDEMIA, UNSPECIFIED: ICD-10-CM

## 2019-12-14 DIAGNOSIS — Z95.1 PRESENCE OF AORTOCORONARY BYPASS GRAFT: Chronic | ICD-10-CM

## 2019-12-14 DIAGNOSIS — E03.9 HYPOTHYROIDISM, UNSPECIFIED: ICD-10-CM

## 2019-12-14 DIAGNOSIS — E11.22 TYPE 2 DIABETES MELLITUS WITH DIABETIC CHRONIC KIDNEY DISEASE: ICD-10-CM

## 2019-12-14 DIAGNOSIS — I67.4 HYPERTENSIVE ENCEPHALOPATHY: ICD-10-CM

## 2019-12-14 LAB
ALBUMIN SERPL ELPH-MCNC: 2.9 G/DL — LOW (ref 3.3–5)
ALBUMIN SERPL ELPH-MCNC: 3.3 G/DL — SIGNIFICANT CHANGE UP (ref 3.3–5)
ALP SERPL-CCNC: 116 U/L — SIGNIFICANT CHANGE UP (ref 40–120)
ALP SERPL-CCNC: 132 U/L — HIGH (ref 40–120)
ALT FLD-CCNC: 14 U/L — SIGNIFICANT CHANGE UP (ref 12–78)
ALT FLD-CCNC: 15 U/L — SIGNIFICANT CHANGE UP (ref 12–78)
AMPHET UR-MCNC: NEGATIVE — SIGNIFICANT CHANGE UP
ANION GAP SERPL CALC-SCNC: 11 MMOL/L — SIGNIFICANT CHANGE UP (ref 5–17)
APPEARANCE UR: CLEAR — SIGNIFICANT CHANGE UP
AST SERPL-CCNC: 16 U/L — SIGNIFICANT CHANGE UP (ref 15–37)
AST SERPL-CCNC: 54 U/L — HIGH (ref 15–37)
BACTERIA # UR AUTO: ABNORMAL
BARBITURATES UR SCN-MCNC: NEGATIVE — SIGNIFICANT CHANGE UP
BENZODIAZ UR-MCNC: NEGATIVE — SIGNIFICANT CHANGE UP
BILIRUB SERPL-MCNC: 0.5 MG/DL — SIGNIFICANT CHANGE UP (ref 0.2–1.2)
BILIRUB SERPL-MCNC: 0.5 MG/DL — SIGNIFICANT CHANGE UP (ref 0.2–1.2)
BILIRUB UR-MCNC: NEGATIVE — SIGNIFICANT CHANGE UP
BLD GP AB SCN SERPL QL: SIGNIFICANT CHANGE UP
BUN SERPL-MCNC: 23 MG/DL — SIGNIFICANT CHANGE UP (ref 7–23)
BUN SERPL-MCNC: 34 MG/DL — HIGH (ref 7–23)
BUN SERPL-MCNC: 41 MG/DL — HIGH (ref 7–23)
CALCIUM SERPL-MCNC: 8.5 MG/DL — SIGNIFICANT CHANGE UP (ref 8.5–10.1)
CALCIUM SERPL-MCNC: 9.1 MG/DL — SIGNIFICANT CHANGE UP (ref 8.5–10.1)
CALCIUM SERPL-MCNC: 9.2 MG/DL — SIGNIFICANT CHANGE UP (ref 8.5–10.1)
CHLORIDE SERPL-SCNC: 93 MMOL/L — LOW (ref 96–108)
CHLORIDE SERPL-SCNC: 93 MMOL/L — LOW (ref 96–108)
CHLORIDE SERPL-SCNC: 99 MMOL/L — SIGNIFICANT CHANGE UP (ref 96–108)
CK MB BLD-MCNC: 4.3 % — HIGH (ref 0–3.5)
CK MB BLD-MCNC: 8.4 % — HIGH (ref 0–3.5)
CK MB CFR SERPL CALC: 3.7 NG/ML — HIGH (ref 0.5–3.6)
CK MB CFR SERPL CALC: 31.7 NG/ML — HIGH (ref 0.5–3.6)
CK SERPL-CCNC: 377 U/L — HIGH (ref 26–308)
CK SERPL-CCNC: 86 U/L — SIGNIFICANT CHANGE UP (ref 26–308)
CO2 SERPL-SCNC: 22 MMOL/L — SIGNIFICANT CHANGE UP (ref 22–31)
CO2 SERPL-SCNC: 23 MMOL/L — SIGNIFICANT CHANGE UP (ref 22–31)
CO2 SERPL-SCNC: 25 MMOL/L — SIGNIFICANT CHANGE UP (ref 22–31)
COCAINE METAB.OTHER UR-MCNC: NEGATIVE — SIGNIFICANT CHANGE UP
COLOR SPEC: YELLOW — SIGNIFICANT CHANGE UP
CREAT SERPL-MCNC: 3.3 MG/DL — HIGH (ref 0.5–1.3)
CREAT SERPL-MCNC: 4.3 MG/DL — HIGH (ref 0.5–1.3)
CREAT SERPL-MCNC: 5.13 MG/DL — HIGH (ref 0.5–1.3)
DIFF PNL FLD: ABNORMAL
EPI CELLS # UR: SIGNIFICANT CHANGE UP
ESTIMATED AVERAGE GLUCOSE: 189 MG/DL — HIGH (ref 68–114)
FIBRINOGEN PPP-MCNC: 452 MG/DL — SIGNIFICANT CHANGE UP (ref 350–510)
GLUCOSE BLDC GLUCOMTR-MCNC: 215 MG/DL — HIGH (ref 70–99)
GLUCOSE BLDC GLUCOMTR-MCNC: 222 MG/DL — HIGH (ref 70–99)
GLUCOSE BLDC GLUCOMTR-MCNC: 252 MG/DL — HIGH (ref 70–99)
GLUCOSE BLDC GLUCOMTR-MCNC: 295 MG/DL — HIGH (ref 70–99)
GLUCOSE BLDC GLUCOMTR-MCNC: 314 MG/DL — HIGH (ref 70–99)
GLUCOSE BLDC GLUCOMTR-MCNC: 387 MG/DL — HIGH (ref 70–99)
GLUCOSE SERPL-MCNC: 192 MG/DL — HIGH (ref 70–99)
GLUCOSE SERPL-MCNC: 322 MG/DL — HIGH (ref 70–99)
GLUCOSE SERPL-MCNC: 391 MG/DL — HIGH (ref 70–99)
GLUCOSE UR QL: 50 MG/DL
HBA1C BLD-MCNC: 8.2 % — HIGH (ref 4–5.6)
HBA1C BLD-MCNC: 8.2 % — HIGH (ref 4–5.6)
HBV SURFACE AG SER-ACNC: SIGNIFICANT CHANGE UP
HCT VFR BLD CALC: 30.4 % — LOW (ref 39–50)
HCT VFR BLD CALC: 33.8 % — LOW (ref 39–50)
HGB BLD-MCNC: 10.8 G/DL — LOW (ref 13–17)
HGB BLD-MCNC: 9.7 G/DL — LOW (ref 13–17)
INR BLD: 1.16 RATIO — SIGNIFICANT CHANGE UP (ref 0.88–1.16)
KETONES UR-MCNC: NEGATIVE — SIGNIFICANT CHANGE UP
LEUKOCYTE ESTERASE UR-ACNC: NEGATIVE — SIGNIFICANT CHANGE UP
MAGNESIUM SERPL-MCNC: 2 MG/DL — SIGNIFICANT CHANGE UP (ref 1.6–2.6)
MCHC RBC-ENTMCNC: 28 PG — SIGNIFICANT CHANGE UP (ref 27–34)
MCHC RBC-ENTMCNC: 28.2 PG — SIGNIFICANT CHANGE UP (ref 27–34)
MCHC RBC-ENTMCNC: 31.9 GM/DL — LOW (ref 32–36)
MCHC RBC-ENTMCNC: 32 GM/DL — SIGNIFICANT CHANGE UP (ref 32–36)
MCV RBC AUTO: 87.6 FL — SIGNIFICANT CHANGE UP (ref 80–100)
MCV RBC AUTO: 88.3 FL — SIGNIFICANT CHANGE UP (ref 80–100)
METHADONE UR-MCNC: NEGATIVE — SIGNIFICANT CHANGE UP
NITRITE UR-MCNC: NEGATIVE — SIGNIFICANT CHANGE UP
NRBC # BLD: 0 /100 WBCS — SIGNIFICANT CHANGE UP (ref 0–0)
NRBC # BLD: 0 /100 WBCS — SIGNIFICANT CHANGE UP (ref 0–0)
OPIATES UR-MCNC: NEGATIVE — SIGNIFICANT CHANGE UP
PCP SPEC-MCNC: SIGNIFICANT CHANGE UP
PCP UR-MCNC: NEGATIVE — SIGNIFICANT CHANGE UP
PH UR: 7 — SIGNIFICANT CHANGE UP (ref 5–8)
PHOSPHATE SERPL-MCNC: 2.2 MG/DL — LOW (ref 2.5–4.5)
PHOSPHATE SERPL-MCNC: 2.5 MG/DL — SIGNIFICANT CHANGE UP (ref 2.5–4.5)
PLATELET # BLD AUTO: 270 K/UL — SIGNIFICANT CHANGE UP (ref 150–400)
PLATELET # BLD AUTO: 279 K/UL — SIGNIFICANT CHANGE UP (ref 150–400)
POTASSIUM SERPL-MCNC: 4.1 MMOL/L — SIGNIFICANT CHANGE UP (ref 3.5–5.3)
POTASSIUM SERPL-MCNC: 5.8 MMOL/L — HIGH (ref 3.5–5.3)
POTASSIUM SERPL-MCNC: 6.5 MMOL/L — CRITICAL HIGH (ref 3.5–5.3)
POTASSIUM SERPL-SCNC: 4.1 MMOL/L — SIGNIFICANT CHANGE UP (ref 3.5–5.3)
POTASSIUM SERPL-SCNC: 5.8 MMOL/L — HIGH (ref 3.5–5.3)
POTASSIUM SERPL-SCNC: 6.5 MMOL/L — CRITICAL HIGH (ref 3.5–5.3)
PROT SERPL-MCNC: 7.3 GM/DL — SIGNIFICANT CHANGE UP (ref 6–8.3)
PROT SERPL-MCNC: 8.1 GM/DL — SIGNIFICANT CHANGE UP (ref 6–8.3)
PROT UR-MCNC: 500 MG/DL
PROTHROM AB SERPL-ACNC: 13 SEC — HIGH (ref 10–12.9)
RBC # BLD: 3.47 M/UL — LOW (ref 4.2–5.8)
RBC # BLD: 3.83 M/UL — LOW (ref 4.2–5.8)
RBC # FLD: 17.9 % — HIGH (ref 10.3–14.5)
RBC # FLD: 18.6 % — HIGH (ref 10.3–14.5)
RBC CASTS # UR COMP ASSIST: ABNORMAL /HPF (ref 0–4)
SALICYLATES SERPL-MCNC: <1.7 MG/DL — LOW (ref 2.8–20)
SODIUM SERPL-SCNC: 126 MMOL/L — LOW (ref 135–145)
SODIUM SERPL-SCNC: 127 MMOL/L — LOW (ref 135–145)
SODIUM SERPL-SCNC: 135 MMOL/L — SIGNIFICANT CHANGE UP (ref 135–145)
SP GR SPEC: 1 — LOW (ref 1.01–1.02)
T3 SERPL-MCNC: 85 NG/DL — SIGNIFICANT CHANGE UP (ref 80–200)
T4 AB SER-ACNC: 7.2 UG/DL — SIGNIFICANT CHANGE UP (ref 4.6–12)
THC UR QL: NEGATIVE — SIGNIFICANT CHANGE UP
TROPONIN I SERPL-MCNC: 0.2 NG/ML — HIGH (ref 0.01–0.04)
TROPONIN I SERPL-MCNC: 6.76 NG/ML — HIGH (ref 0.01–0.04)
TSH SERPL-MCNC: 1.16 UIU/ML — SIGNIFICANT CHANGE UP (ref 0.36–3.74)
UROBILINOGEN FLD QL: NEGATIVE MG/DL — SIGNIFICANT CHANGE UP
WBC # BLD: 21.41 K/UL — HIGH (ref 3.8–10.5)
WBC # BLD: 24.14 K/UL — HIGH (ref 3.8–10.5)
WBC # FLD AUTO: 21.41 K/UL — HIGH (ref 3.8–10.5)
WBC # FLD AUTO: 24.14 K/UL — HIGH (ref 3.8–10.5)
WBC UR QL: SIGNIFICANT CHANGE UP

## 2019-12-14 PROCEDURE — 70498 CT ANGIOGRAPHY NECK: CPT | Mod: 26

## 2019-12-14 PROCEDURE — 70450 CT HEAD/BRAIN W/O DYE: CPT | Mod: 26,59

## 2019-12-14 PROCEDURE — 93010 ELECTROCARDIOGRAM REPORT: CPT

## 2019-12-14 PROCEDURE — 99291 CRITICAL CARE FIRST HOUR: CPT

## 2019-12-14 PROCEDURE — 70450 CT HEAD/BRAIN W/O DYE: CPT | Mod: 26,59,77

## 2019-12-14 PROCEDURE — 70496 CT ANGIOGRAPHY HEAD: CPT | Mod: 26

## 2019-12-14 PROCEDURE — 71045 X-RAY EXAM CHEST 1 VIEW: CPT | Mod: 26

## 2019-12-14 RX ORDER — METOPROLOL TARTRATE 50 MG
2.5 TABLET ORAL EVERY 6 HOURS
Refills: 0 | Status: DISCONTINUED | OUTPATIENT
Start: 2019-12-14 | End: 2019-12-14

## 2019-12-14 RX ORDER — SODIUM CHLORIDE 9 MG/ML
1000 INJECTION, SOLUTION INTRAVENOUS
Refills: 0 | Status: DISCONTINUED | OUTPATIENT
Start: 2019-12-14 | End: 2019-12-19

## 2019-12-14 RX ORDER — CARVEDILOL PHOSPHATE 80 MG/1
6.25 CAPSULE, EXTENDED RELEASE ORAL EVERY 12 HOURS
Refills: 0 | Status: DISCONTINUED | OUTPATIENT
Start: 2019-12-14 | End: 2019-12-15

## 2019-12-14 RX ORDER — LOSARTAN POTASSIUM 100 MG/1
100 TABLET, FILM COATED ORAL DAILY
Refills: 0 | Status: DISCONTINUED | OUTPATIENT
Start: 2019-12-14 | End: 2019-12-14

## 2019-12-14 RX ORDER — DEXTROSE 50 % IN WATER 50 %
12.5 SYRINGE (ML) INTRAVENOUS ONCE
Refills: 0 | Status: DISCONTINUED | OUTPATIENT
Start: 2019-12-14 | End: 2019-12-19

## 2019-12-14 RX ORDER — PANTOPRAZOLE SODIUM 20 MG/1
40 TABLET, DELAYED RELEASE ORAL DAILY
Refills: 0 | Status: DISCONTINUED | OUTPATIENT
Start: 2019-12-14 | End: 2019-12-18

## 2019-12-14 RX ORDER — GLUCAGON INJECTION, SOLUTION 0.5 MG/.1ML
1 INJECTION, SOLUTION SUBCUTANEOUS ONCE
Refills: 0 | Status: DISCONTINUED | OUTPATIENT
Start: 2019-12-14 | End: 2019-12-19

## 2019-12-14 RX ORDER — NICARDIPINE HYDROCHLORIDE 30 MG/1
5 CAPSULE, EXTENDED RELEASE ORAL
Qty: 40 | Refills: 0 | Status: DISCONTINUED | OUTPATIENT
Start: 2019-12-14 | End: 2019-12-15

## 2019-12-14 RX ORDER — INSULIN LISPRO 100/ML
VIAL (ML) SUBCUTANEOUS EVERY 6 HOURS
Refills: 0 | Status: DISCONTINUED | OUTPATIENT
Start: 2019-12-14 | End: 2019-12-15

## 2019-12-14 RX ORDER — LEVOTHYROXINE SODIUM 125 MCG
25 TABLET ORAL AT BEDTIME
Refills: 0 | Status: DISCONTINUED | OUTPATIENT
Start: 2019-12-14 | End: 2019-12-19

## 2019-12-14 RX ORDER — SODIUM CHLORIDE 9 MG/ML
1000 INJECTION, SOLUTION INTRAVENOUS
Refills: 0 | Status: DISCONTINUED | OUTPATIENT
Start: 2019-12-14 | End: 2019-12-14

## 2019-12-14 RX ORDER — INSULIN HUMAN 100 [IU]/ML
10 INJECTION, SOLUTION SUBCUTANEOUS ONCE
Refills: 0 | Status: COMPLETED | OUTPATIENT
Start: 2019-12-14 | End: 2019-12-14

## 2019-12-14 RX ORDER — SODIUM BICARBONATE 1 MEQ/ML
50 SYRINGE (ML) INTRAVENOUS ONCE
Refills: 0 | Status: COMPLETED | OUTPATIENT
Start: 2019-12-14 | End: 2019-12-14

## 2019-12-14 RX ORDER — NIMODIPINE 60 MG/10ML
60 SOLUTION ORAL EVERY 4 HOURS
Refills: 0 | Status: DISCONTINUED | OUTPATIENT
Start: 2019-12-15 | End: 2019-12-16

## 2019-12-14 RX ORDER — CALCIUM GLUCONATE 100 MG/ML
1 VIAL (ML) INTRAVENOUS ONCE
Refills: 0 | Status: COMPLETED | OUTPATIENT
Start: 2019-12-14 | End: 2019-12-14

## 2019-12-14 RX ORDER — SODIUM CHLORIDE 5 G/100ML
500 INJECTION, SOLUTION INTRAVENOUS
Refills: 0 | Status: DISCONTINUED | OUTPATIENT
Start: 2019-12-14 | End: 2019-12-16

## 2019-12-14 RX ORDER — DEXTROSE 50 % IN WATER 50 %
15 SYRINGE (ML) INTRAVENOUS ONCE
Refills: 0 | Status: DISCONTINUED | OUTPATIENT
Start: 2019-12-14 | End: 2019-12-19

## 2019-12-14 RX ORDER — HYDRALAZINE HCL 50 MG
10 TABLET ORAL ONCE
Refills: 0 | Status: COMPLETED | OUTPATIENT
Start: 2019-12-14 | End: 2019-12-14

## 2019-12-14 RX ORDER — HYDRALAZINE HCL 50 MG
5 TABLET ORAL EVERY 6 HOURS
Refills: 0 | Status: DISCONTINUED | OUTPATIENT
Start: 2019-12-14 | End: 2019-12-14

## 2019-12-14 RX ORDER — ATORVASTATIN CALCIUM 80 MG/1
80 TABLET, FILM COATED ORAL AT BEDTIME
Refills: 0 | Status: DISCONTINUED | OUTPATIENT
Start: 2019-12-14 | End: 2019-12-19

## 2019-12-14 RX ADMIN — Medication 4: at 05:59

## 2019-12-14 RX ADMIN — Medication 200 GRAM(S): at 08:20

## 2019-12-14 RX ADMIN — Medication 2: at 23:27

## 2019-12-14 RX ADMIN — Medication 1 MILLIGRAM(S): at 05:40

## 2019-12-14 RX ADMIN — Medication 2.5 MILLIGRAM(S): at 03:03

## 2019-12-14 RX ADMIN — Medication 25 MICROGRAM(S): at 22:42

## 2019-12-14 RX ADMIN — Medication 3: at 18:06

## 2019-12-14 RX ADMIN — PANTOPRAZOLE SODIUM 40 MILLIGRAM(S): 20 TABLET, DELAYED RELEASE ORAL at 12:08

## 2019-12-14 RX ADMIN — SODIUM CHLORIDE 30 MILLILITER(S): 9 INJECTION, SOLUTION INTRAVENOUS at 02:56

## 2019-12-14 RX ADMIN — ALTEPLASE 402 MILLIGRAM(S): KIT at 00:15

## 2019-12-14 RX ADMIN — Medication 5: at 12:08

## 2019-12-14 RX ADMIN — Medication 10 MILLIGRAM(S): at 03:34

## 2019-12-14 RX ADMIN — NICARDIPINE HYDROCHLORIDE 25 MG/HR: 30 CAPSULE, EXTENDED RELEASE ORAL at 03:41

## 2019-12-14 RX ADMIN — INSULIN HUMAN 10 UNIT(S): 100 INJECTION, SOLUTION SUBCUTANEOUS at 07:52

## 2019-12-14 RX ADMIN — ALTEPLASE 60.6 MILLIGRAM(S): KIT at 00:16

## 2019-12-14 RX ADMIN — SODIUM CHLORIDE 30 MILLILITER(S): 5 INJECTION, SOLUTION INTRAVENOUS at 13:05

## 2019-12-14 RX ADMIN — NICARDIPINE HYDROCHLORIDE 25 MG/HR: 30 CAPSULE, EXTENDED RELEASE ORAL at 03:44

## 2019-12-14 RX ADMIN — Medication 50 MILLIEQUIVALENT(S): at 08:20

## 2019-12-14 NOTE — H&P ADULT - PROBLEM SELECTOR PROBLEM 2
Tissue plasminogen activator (tPA) administered at other facility within 24 hours before current adm

## 2019-12-14 NOTE — H&P ADULT - NSHPPHYSICALEXAM_GEN_ALL_CORE
Vital Signs Last 24 Hrs  T(C): 36.3 (14 Dec 2019 02:09), Max: 36.4 (13 Dec 2019 22:48)  T(F): 97.3 (14 Dec 2019 02:09), Max: 97.6 (13 Dec 2019 22:48)  HR: 86 (14 Dec 2019 02:45) (73 - 87)  /77 (14 DEC 2019 01:30) HR 83 SpO2 95  BP: 188/84 (14 Dec 2019 02:45) (137/62 - 249/114)  BP(mean): 114 (14 Dec 2019 02:45) (108 - 114)  RR: 22 (14 Dec 2019 02:45) (19 - 26)  SpO2: 100% (14 Dec 2019 02:45) (97% - 100%)    GENERAL:  disorganized slight agitation   HEAD:  Atraumatic, Normocephalic  EYES: PERRL, conjunctiva cleae  ENT: tongue bit with slight blood   CHEST/LUNG: Clear to auscultation bilaterally; No wheeze  HEART: s1/S2 murmur at 2ndICS grade 2/6 right sternal border  ABDOMEN: Soft, Nontender, Nondistended; Bowel sounds present  EXTREMITIES:  2+ Peripheral Pulses, No clubbing, cyanosis, or edema  SKIN: No rashes or lesions  Neuro NIH stroke scale 21, aphasic, responsive, moving all lower extremities Left > right  left upper arm neglect with resistance Vital Signs Last 24 Hrs  T(C): 36.3 (14 Dec 2019 02:09), Max: 36.4 (13 Dec 2019 22:48)  T(F): 97.3 (14 Dec 2019 02:09), Max: 97.6 (13 Dec 2019 22:48)  HR: 86 (14 Dec 2019 02:45) (73 - 87)  /77 (14 DEC 2019 01:30) HR 83 SpO2 95  BP: 188/84 (14 Dec 2019 02:45) (137/62 - 249/114)  BP(mean): 114 (14 Dec 2019 02:45) (108 - 114)  RR: 22 (14 Dec 2019 02:45) (19 - 26)  SpO2: 100% (14 Dec 2019 02:45) (97% - 100%)    GENERAL:  disorganized slight agitation   HEAD:  Atraumatic, Normocephalic  EYES: pupils reactive to light sluggish   ENT: tongue bit with slight blood   LUNG: Clear to auscultation bilaterally; No wheeze                   HEART: s1/S2 murmur at 2ndICS grade 2/6 right sternal border   EKG 0100- HR 83, LAE, septal changes   EKG 0130 HR 81, NSR, LVH   Old EKG reviewed in EMR no significant changes   ABDOMEN: Soft, Nontender, Nondistended; Bowel sounds present  EXTREMITIES:  2+ Peripheral Pulses, No clubbing, cyanosis, or edema  SKIN: No rashes or lesions  Neuro NIH stroke scale 21, aphasic, responsive, moving all lower extremities Left > right  left upper arm neglect with resistance Vital Signs Last 24 Hrs  T(C): 36.3 (14 Dec 2019 02:09), Max: 36.4 (13 Dec 2019 22:48)  T(F): 97.3 (14 Dec 2019 02:09), Max: 97.6 (13 Dec 2019 22:48)  HR: 86 (14 Dec 2019 02:45) (73 - 87)  /77 (14 DEC 2019 01:30) HR 83 SpO2 95  BP: 188/84 (14 Dec 2019 02:45) (137/62 - 249/114)  BP(mean): 114 (14 Dec 2019 02:45) (108 - 114)  RR: 22 (14 Dec 2019 02:45) (19 - 26)  SpO2: 100% (14 Dec 2019 02:45) (97% - 100%)    GENERAL:  disorganized slight agitation   HEAD:  Atraumatic, Normocephalic  EYES: pupils reactive to light sluggish, left deviated gaze  Face- minor assymetry    ENT: tongue bit with slight blood   LUNG: Clear to auscultation bilaterally; No wheeze                   HEART: s1/S2 murmur at 2ndICS grade 2/6 right sternal border   EKG 0100- HR 83, LAE, septal changes   EKG 0130 HR 81, NSR, LVH   Old EKG reviewed in EMR no significant changes   ABDOMEN: Soft, Nontender, Nondistended; Bowel sounds present  EXTREMITIES:  2+ Peripheral Pulses, No clubbing, cyanosis, or edema  SKIN: No rashes or lesions  Neuro NIH stroke scale 21, aphasic, responsive, moving all lower extremities Left > right  right upper arm neglect with resistance

## 2019-12-14 NOTE — CONSULT NOTE ADULT - ASSESSMENT
Subjective Complaints:      Consult requested by ER doctor:                  Attending:     History of Present Illness:  Chief Complaint/Reason for Admission:  History of Present Illness:  HPI:  65 yo male with ESRD on HD ( without AVF as awaiting renal transplant expected within 3 months), CAD s/p stent  and abnormal cath 2018 with 90% pRCA lesion (not intervened upon) with placement of drug eluting stent everolimus Synergy  3x 28 mm in 2019, HTN, DM type 2, hypothyroidism and left carotid stenosis presents to the ED for altered mental status. HPI proceeded today by dialysis with 2.5 liters removed. Patient complaint of weakness last noted normal at 21:15. EMS contactd ny wife when patient noted unresponsive, aphasic and not moving extremities. TpA administered on 00:16 am and patient then noted moving extremities with left sided weakness. His son state that his bp normally runs high, up to 's. for cardiac cath. Pt is awaiting renal transplant, May 2019 transplant deferred for  cardiac cath as per his PMD and cardiologist prior to being cleared for transplant surgery. Patient denies chest pain, palpitations, dyspnea, headaches, or lower extremity edema. Pt and his son state that his bp normally runs high, up to 's. (14 Dec 2019 02:39)        PAST MEDICAL & SURGICAL HISTORY:  Hypothyroidism  CRI (Chronic Renal Insufficiency)  CAD (Coronary Artery Disease): with Stents in 2009 and 2019  Dyslipidemia  Diabetes  Hypertension  History of insertion of stent into coronary artery bypass graft  64yMale    MEDICATIONS  (STANDING):  atorvastatin 80 milliGRAM(s) Oral at bedtime  dextrose 5%. 1000 milliLiter(s) (50 mL/Hr) IV Continuous <Continuous>  dextrose 50% Injectable 12.5 Gram(s) IV Push once  insulin lispro (HumaLOG) corrective regimen sliding scale   SubCutaneous every 6 hours  levothyroxine Injectable 25 MICROGram(s) IV Push at bedtime  niCARdipine Infusion 5 mG/Hr (25 mL/Hr) IV Continuous <Continuous>  pantoprazole  Injectable 40 milliGRAM(s) IV Push daily  sodium chloride 3%. 500 milliLiter(s) (30 mL/Hr) IV Continuous <Continuous>    MEDICATIONS  (PRN):  dextrose 40% Gel 15 Gram(s) Oral once PRN Blood Glucose LESS THAN 70 milliGRAM(s)/deciliter  glucagon  Injectable 1 milliGRAM(s) IntraMuscular once PRN Glucose LESS THAN 70 milligrams/deciliter      Allergies    No Known Allergies    Intolerances      FAMILY HISTORY:  FH: HTN (hypertension): Father      REVIEW OF SYSTEMS:  General:  No wt loss, fevers, chills, night sweats  Eyes:  Good vision, no reported pain  ENT:  No sore throat, pain, runny nose, dysphagia  CV:  No pain, palpitatioins, hypo/hypertension  Resp:  No dyspnea, cough, tachypnea, wheezing  GI:  No pain, nausea, vomiting, diarrhea, constipatiion  :  No pain, bleeding, incontinence, nocturia  Muscle:  No pain, weakness  Breast:  No pain, abscess, mass, discharge  Neuro:  No weakness, tingling, memory problems  Psych:  No fatigue, insomnia, mood problems, depression  Endocrine:  No polyuria, polydypsia, cold/heat intolerance  Heme:  No petechiae, ecchymosis, easy bruisability  Skin:  No rash, tattoos, scars, edema      Vital Signs Last 24 Hrs  T(C): 37.5 (14 Dec 2019 17:20), Max: 37.5 (14 Dec 2019 16:50)  T(F): 99.5 (14 Dec 2019 17:20), Max: 99.5 (14 Dec 2019 16:50)  HR: 96 (14 Dec 2019 19:18) (73 - 100)  BP: 163/63 (14 Dec 2019 19:00) (137/62 - 249/114)  BP(mean): 87 (14 Dec 2019 19:00) (74 - 118)  RR: 28 (14 Dec 2019 19:18) (13 - 35)  SpO2: 99% (14 Dec 2019 19:18) (94% - 100%)    GENERAL PHYSICAL EXAM:  General:  Appears stated age, well-groomed, well-nourished, no distress  HEENT:  NC/AT, patent nares w/ pink mucosa, OP clear w/o lesions, PERRL, EOMI, conjunctivae clear, no thyromegaly, nodules, adenopathy, no JVD  Chest:  Full & symmetric excursion, no increased effort, breath sounds clear  Cardiovascular:  Regular rhythm, S1, S2, no murmur/rub/S3/S4, no carotid/femoral/abdominal bruit, radial/pedal pulses 2+, no edema  Abdomen:  Soft, non-tender, non-distended, normoactive bowel sounds, no HSM  Extremities:  Gait & station:   Digits:   Nails:   Joints, Bones, Muscles:   ROM:   Stability:  Skin:  No rash/erythema/ecchymoses/petechiae/wounds/abscess/warm/dry  Musculoskeletal:  Full ROM in all joints w/o swelling/tenderness/effusion    NEUROLOGICAL EXAM:  HENT:  Normocephalic head; atraumatic head.  Neck supple.  ENT: normal looking.  Mental State:  LABS:  events noted pt is unresponsive pupils reacts to  light  does not follws commands  neck supple arm legs  quadropaRESIS  MOVES R ARM TO PAINFUL STIMULI  MINMALLY  NO SEZIURE REPORTED  S/P TPA HX OF HTN ESRD  DIABTES  CAD HAD STENT                         10.8   24.14 )-----------( 279      ( 14 Dec 2019 06:30 )             33.8     12-14    127<L>  |  93<L>  |  41<H>  ----------------------------<  391<H>  5.8<H>   |  23  |  5.13<H>    Ca    9.1      14 Dec 2019 12:27  Phos  2.2     12-14  Mg     2.0     12-14    TPro  8.1  /  Alb  3.3  /  TBili  0.5  /  DBili  x   /  AST  16  /  ALT  14  /  AlkPhos  132<H>  12-14    PT/INR - ( 13 Dec 2019 23:13 )   PT: 12.1 sec;   INR: 1.08 ratio         PTT - ( 13 Dec 2019 23:13 )  PTT:31.5 sec    Urinalysis Basic - ( 14 Dec 2019 01:09 )    Color: Yellow / Appearance: Clear / S.005 / pH: x  Gluc: x / Ketone: Negative  / Bili: Negative / Urobili: Negative mg/dL   Blood: x / Protein: 500 mg/dL / Nitrite: Negative   Leuk Esterase: Negative / RBC: 3-5 /HPF / WBC 0-2   Sq Epi: x / Non Sq Epi: Occasional / Bacteria: Few        RADIOLOGY & ADDITIONAL STUDIES:      Assessment & Opinion: EVENTS NOTED  REPET CT HEAD NOTED [facility]  FLORENCE DIFFUSE UNRESPONSIVE  CTA BRAIN AND NECK NOTED NO ACUTE OCCLUSION , S/P TPA FOR R SIDE WEAKENSS SPEECH  APHASIC  NOW QUADROPAresis .  discuss with  son art bed side md  for mri libra  nimotop 60mg q 4hr.  will follow on  cardene drip for htn     Recommendations:  Brain MRI.  Carotid doppler.  Echocardiogram.  EEG.   DVT prophylaxis as ordered.  Medications:
65 yo male with ESRD on HD (M-W-FRI without AVF as awaiting renal transplant expected within 3 months), CAD s/p stent 2009 and abnormal cath 9/2018 with 90% pRCA lesion (not intervened upon) with placement of drug eluting stent everolimus Synergy  3x 28 mm in June 2019, HTN, DM type 2, hypothyroidism and left carotid stenosis presents to the ED for altered mental status.    ESRD on HD  Last HD Friday  Noted hyperkalemia  Agree with repeat BMP today  Consent obtained in the chart.   If HD needed, will do low blood flow treatment   (although CVVHD would be the preferred method of choice)    Hyperkalemia  s/p calcium gluconate.   Agree with medical managemnet  Plan for repeat BMP  Will arrange HD if needed    Anemia  Hb at goal at present     HTN  As per MICU  Will avoid hypotension/ UF with HD to avoid shifting

## 2019-12-14 NOTE — PROVIDER CONTACT NOTE (EICU) - ASSESSMENT
Plan:  - Repeat CT head again in 4 hour  - Continue Neuro check Q1hr  - Goal  mm of Hg - Acute CVA  - Subarachnoid hemorrhage  - CKD  - Hypertensive emergency

## 2019-12-14 NOTE — H&P ADULT - NSHPSOURCEINFOTX_GEN_ALL_CORE
HCP (son in [place]- Cardiologist [phone]; alternate proxy Juan Castelan son  HCP (son in [place]- Cardiologist [phone]; alternate proxy Juan Castelan son

## 2019-12-14 NOTE — H&P ADULT - HISTORY OF PRESENT ILLNESS
64 yo male with ESRD on HD (M-W-FRI without AVF as awaiting renal transplant expected within 3 months), CAD s/p stent 2009 and abnormal cath 9/2018 with 90% pRCA lesion (not intervened upon) with placement of drug eluting stent everolimus Synergy  3x 28 mm in June 2019, HTN, DM type 2, hypothyroidism and left carotid stenosis presents to the ED for altered mental status. HPI proceeded today by dialysis with 2.5 liters removed. Patient complaint of weakness last noted normal at 21:15. EMS contactd ny wife when patient noted unresponsive, aphasic and not moving extremities. TpA administered on 00:16 am and patient then noted moving extremities with left sided weakness. His son state that his bp normally runs high, up to 's. for cardiac cath. Pt is awaiting renal transplant, May 2019 transplant deferred for  cardiac cath as per his PMD and cardiologist prior to being cleared for transplant surgery. Patient denies chest pain, palpitations, dyspnea, headaches, or lower extremity edema. Pt and his son state that his bp normally runs high, up to 's. 65 yo male with ESRD on HD (M-W-FRI without AVF as awaiting renal transplant expected within 3 months), CAD s/p stent 2009 and abnormal cath 9/2018 with 90% pRCA lesion (not intervened upon) with placement of drug eluting stent everolimus Synergy  3x 28 mm in June 2019, HTN, DM type 2, hypothyroidism and left carotid stenosis presents to the ED for altered mental status. HPI proceeded today by dialysis with 2.5 liters removed. Patient complaint of weakness last noted normal at 21:15. EMS contactd ny wife when patient noted unresponsive, aphasic and not moving extremities. TpA administered on 00:16 am and patient then noted moving extremities with left sided weakness. His son state that his bp normally runs high, up to 's. for cardiac cath. Pt is awaiting renal transplant, May 2019 transplant deferred for  cardiac cath as per his PMD and cardiologist prior to being cleared for transplant surgery. Patient denies chest pain, palpitations, dyspnea, headaches, or lower extremity edema. Pt and his son state that his bp normally runs high, up to 's.

## 2019-12-14 NOTE — H&P ADULT - NSICDXPASTMEDICALHX_GEN_ALL_CORE_FT
PAST MEDICAL HISTORY:  CAD (Coronary Artery Disease) with Stents in 06/2009 and 6/2019    CRI (Chronic Renal Insufficiency)     Diabetes     Dyslipidemia     Hypertension     Hypothyroidism

## 2019-12-14 NOTE — CHART NOTE - NSCHARTNOTEFT_GEN_A_CORE
HPI:  65 yo male with ESRD on HD (M-W-FRI without AVF as awaiting renal transplant expected within 3 months), CAD s/p stent 2009 and abnormal cath 9/2018 with 90% pRCA lesion (not intervened upon) with placement of drug eluting stent everolimus Synergy  3x 28 mm in June 2019, HTN, DM type 2, hypothyroidism and left carotid stenosis presents to the ED for altered mental status. HPI proceeded today by dialysis with 2.5 liters removed. Patient complaint of weakness last noted normal at 21:15. EMS contactd ny wife when patient noted unresponsive, aphasic and not moving extremities. TpA administered on 00:16 am and patient then noted moving extremities with left sided weakness. His son state that his bp normally runs high, up to 's. for cardiac cath. Pt is awaiting renal transplant, May 2019 transplant deferred for  cardiac cath as per his PMD and cardiologist prior to being cleared for transplant surgery. Patient denies chest pain, palpitations, dyspnea, headaches, or lower extremity edema. Pt and his son state that his bp normally runs high, up to 's. (14 Dec 2019 02:39)    Patient noted at 0130 to begin moving right lower extremity with some clear words and found with NIH Scale 21. Patient with agitation, moaning and yelling repeat CT scan performed and noted some mild hemorrhage with hemorrhagic conversion. Dr. Castelan son at bedside discussed health conditions. Patient had been taking erythropoietin.    Presently patient now sleeping yet arousable. NIH scale repeated  Vital Signs Last 24 Hrs  T(C): 37 (14 Dec 2019 04:45), Max: 37 (14 Dec 2019 04:45)  T(F): 98.6 (14 Dec 2019 04:45), Max: 98.6 (14 Dec 2019 04:45)  HR: 86 (14 Dec 2019 03:55) (73 - 90)  BP: 158/61 (14 Dec 2019 03:55) (137/62 - 249/114)  BP(mean): 86 (14 Dec 2019 03:55) (86 - 118)  RR: 22 (14 Dec 2019 03:55) (18 - 26)  SpO2: 99% (14 Dec 2019 03:55) (97% - 100%)    ENT: ;eft deviated gaze, pupils sluggish  Moves lower extremities L >R  Moves LUE against gravity  no movement of RUE     RESULT SUMMARY:  24 points  NIH Stroke Scale      INPUTS:  1A: Level of consciousness —> 2 = Requires repeated stimulation to arouse  1B: Ask month and age —> 2 = Aphasic  1C: 'Blink eyes' & 'squeeze hands' —> 1 = Performs 1 task  2: Horizontal extraocular movements —> 1 = Partial gaze palsy: can be overcome  3: Visual fields —> 0 = No visual loss  4: Facial palsy —> 1 = Minor paralysis (flat nasolabial fold, smile asymmetry)  5A: Left arm motor drift —> 2 = Some effort against gravity  5B: Right arm motor drift —> 4 = No movement  6A: Left leg motor drift —> 2 = Some effort against gravity  6B: Right leg motor drift —> 2 = Some effort against gravity  7: Limb Ataxia —> 0 = Does not understand  8: Sensation —> 1 = Mild-moderate loss: can sense being touched   9: Language/aphasia —> 3 = Mute/global aphasia: no usable speech/auditory comprehension  10: Dysarthria —> 2 = Severe dysarthria: unintelligible slurring or out of proportion to dysphasia  11: Extinction/inattention —> 1 = Visual/tactile/auditory/spatial/personal inattention      CT Scan of head   FINDINGS:   Brain: There is multifocal subarachnoid hemorrhage in the superior   frontal and   parietal lobes bilaterally as well as in the left temporal lobe. There is   some   sulcal effacement in the superior brain bilaterally, indicating cerebral   edema.   Ventricles: Normal. No ventriculomegaly.   Bones/joints: Unremarkable. No acute fracture.   Sinuses: Visualized sinuses are unremarkable. No fluid levels.   Mastoid air cells: Visualized mastoid air cells are well aerated.   Soft tissues: Unremarkable.   Other findings: No definite loss of gray matter differentiation.     IMPRESSION:   1. There is multifocal subarachnoid hemorrhage in the superior frontal   and   parietal lobes bilaterally as well as in the left temporal lobe.   2. There is some sulcal effacement in the superior brain bilaterally,   indicating cerebral edema.      Hemorrhagic conversion of embolic stroke- Case discussed with HPI:  65 yo male with ESRD on HD (M-W-FRI without AVF as awaiting renal transplant expected within 3 months), CAD s/p stent 2009 and abnormal cath 9/2018 with 90% pRCA lesion (not intervened upon) with placement of drug eluting stent everolimus Synergy  3x 28 mm in June 2019, HTN, DM type 2, hypothyroidism and left carotid stenosis presents to the ED for altered mental status. HPI proceeded today by dialysis with 2.5 liters removed. Patient complaint of weakness last noted normal at 21:15. EMS contactd ny wife when patient noted unresponsive, aphasic and not moving extremities. TpA administered on 00:16 am and patient then noted moving extremities with left sided weakness. His son state that his bp normally runs high, up to 's. for cardiac cath. Pt is awaiting renal transplant, May 2019 transplant deferred for  cardiac cath as per his PMD and cardiologist prior to being cleared for transplant surgery. Patient denies chest pain, palpitations, dyspnea, headaches, or lower extremity edema. Pt and his son state that his bp normally runs high, up to 's. (14 Dec 2019 02:39)    Patient noted at 0130 to begin moving right lower extremity with some clear words and found with NIH Scale 21. Patient with agitation, moaning and yelling repeat CT scan performed and noted some mild hemorrhage with hemorrhagic conversion. Dr. Castelan son at bedside discussed health conditions. Patient had been taking erythropoietin.    Presently patient now sleeping yet arousable. NIH scale repeated  Vital Signs Last 24 Hrs  T(C): 37 (14 Dec 2019 04:45), Max: 37 (14 Dec 2019 04:45)  T(F): 98.6 (14 Dec 2019 04:45), Max: 98.6 (14 Dec 2019 04:45)  HR: 86 (14 Dec 2019 03:55) (73 - 90)  BP: 158/61 (14 Dec 2019 03:55) (137/62 - 249/114)  BP(mean): 86 (14 Dec 2019 03:55) (86 - 118)  RR: 22 (14 Dec 2019 03:55) (18 - 26)  SpO2: 99% (14 Dec 2019 03:55) (97% - 100%)    ENT: ;eft deviated gaze, pupils sluggish  Moves lower extremities L >R  Moves LUE against gravity  no movement of RUE     RESULT SUMMARY:  24 points  NIH Stroke Scale      1A: Level of consciousness=  2 = Requires repeated stimulation to arouse       0= Alert; keenly responsive       +1= Arouses to minor stimulation       +2= Requires repeated stimulation to arouse       +2= Movements to pain       +3= Postures or unresponsive  1B: Ask month and age +2= Aphasic       0= Both questions right       +1= 1 question right       +2= 0 questions right       +1= Dysarthric/intubated/trauma/language barrier       +2= Aphasic  1C: "Blink eyes" and "Squeeze Hands"     +1= Performs 1 task       0= Performs both       +1= Performs 1 task       +2= Performs 0 tasks    2: Horizontal EOMs 1 = Partial gaze palsy: can be overcome       0= Normal       +1= Partial gaze palsy: can be overcome       +1= Partial gaze palsy: corrects w/ oculocephalic reflex        +2= Forzed gaze palsy: cannot be overcome    3: Visual fields=  0= No visual loss       0= No visual loss       +1= Partial hemianopia       +2= Complete hemianopia       +3= Patient is b/l blind       +3= B/l hemianopia    4: Facial palsy (use grimace if obtunded) 4: Facial palsy —> 1 = Minor paralysis (flat nasolabial fold, smile asymmetry)       0= Normal symmetry       +1= Minor paralysis (flat NLF, smile asymmetry)       +2= Partial paralysis ( lower face)       +3= Unilateral complete paralysis (upper/lower face)       +3= B/l complete paralysis (upper/lower face)    5A: Left arm motor drift (count out loud and use fingers to show count)+2= Some effort against gravity       0= No drift x 10 seconds       +1= Drift but doesn't hit bed       +2= Drift, hits bed       +2= Some effort against gravity       +3= No effort against gravity       +4= No movement       0= Amputation/joint fusion  5B: Right arm motor drift   +4= No movement       0= No drift x 10 seconds       +1= Drift but doesn't hit bed       +2= Drift, hits bed       +2= Some effort against gravity       +3= No effort against gravity       +4= No movement       0= Amputation/joint fusion    6A: Left leg motor drift  +2= Some effort against gravity       0= No drift x 10 seconds       +1= Drift but doesn't hit bed       +2= Drift, hits bed       +2= Some effort against gravity       +3= No effort against gravity       +4= No movement       0= Amputation/joint fusion    6B: Right leg motor drift  +2= Some effort against gravity       0= No drift x 10 seconds       +1= Drift but doesn't hit bed       +2= Drift, hits bed       +2= Some effort against gravity       +3= No effort against gravity       +4= No movement       0= Amputation/joint fusion    7: Limb ataxia (FNF/heel-shin)0= Does not understand       0= No ataxia       +1= Ataxia in 1 limb       +2= Ataxia in 2 limbs       0= Does not understand       0= Paralyzed       0= Amputation/joint fusion    8: Sensation  +1= mild-moderate loss: less sharp/more dull       0= Normal, no sensory loss       +1= mild-moderate loss: less sharp/more dull       +1= mild-moderate loss: can sense being touched       +2= Complete loss: cannot sense being touched at all -> does not grimace to deep noxious stimulus       +2= No response and quadriplegic       +2= Coma/unresponsive    9: Language/aphasia- 3 = Mute/global aphasia: no usable speech/auditory comprehension       0= Normal, no aphasia       +1= mild-moderate aphaisa: some obvious changes without significant limitation       +2= Severe aphasia: fragmentary expression, inference needed, cannot identify materials       +3= Mute/global aphasia: no usable speech/auditory comprehension       +3= coma/unresponsive    10: Dysarthria- read the words 2 = Severe dysarthria: unintelligible slurring or out of proportion to dysphasia       0= Normal       +1= mild-moderate dysarthria: slurring but can be understood       +2= Severe dysarthria: unintelligible slurring or out of proportion to dysphasia       +2= Mute/anarthric        0= Intubated/unable to test    11: Extinction/inattention   +1= visual/tactile/auditory/spatial/personal inattention       0= No abnormality       +1= visual/tactile/auditory/spatial/personal inattention       +1= Extinction to b/l simultaneous stimulation       +2= Profound kris-inattention (e.g. does not recognize own hand)       +2= extinction to > 1 modality    CT Scan of head   FINDINGS:   Brain: There is multifocal subarachnoid hemorrhage in the superior   frontal and   parietal lobes bilaterally as well as in the left temporal lobe. There is   some   sulcal effacement in the superior brain bilaterally, indicating cerebral   edema.   Ventricles: Normal. No ventriculomegaly.   Bones/joints: Unremarkable. No acute fracture.   Sinuses: Visualized sinuses are unremarkable. No fluid levels.   Mastoid air cells: Visualized mastoid air cells are well aerated.   Soft tissues: Unremarkable.   Other findings: No definite loss of gray matter differentiation.     IMPRESSION:   1. There is multifocal subarachnoid hemorrhage in the superior frontal   and   parietal lobes bilaterally as well as in the left temporal lobe.   2. There is some sulcal effacement in the superior brain bilaterally,   indicating cerebral edema.      A/P Hemorrhagic conversion of embolic stroke-     Case discussed with Dr. Velasquez- telestroke and reviewed Head Ct Scan- agreed minor hemorrhagic conversion with mild cerebral edema. Obtain fibrinogen assay to assess for need for cryoprecipitate and FFP. Risks of transfusion include delaying renal transplant. Benefits suggested by Guideline for Reversal of Antithrombotics in Intracranial  Hemorrhage A Statement for Healthcare Professionals from the Neurocritical Care Society and  Society of Critical Care Medicine if patient's fibrinogen level is less than 100.    1. Obtain fibrinogen level; as patient is without mass effect and significant hemorrhage; will give FFP and cryoprecipitate only if less than 100  2. Repeat Head Ct Scan in 4 hours       Patient stable, protecting airway maintain tight glycemic control and SBP between 160-180

## 2019-12-14 NOTE — H&P ADULT - NSHPREVIEWOFSYSTEMS_GEN_ALL_CORE
General:	No fevers or chills  Skin/Breast: No rashes or lesions  Ophthalmologic: No vision changes  ENMT: No sore throat. No dysphagia	  Respiratory and Thorax: No dyspnea, cough, or wheezing  Cardiovascular: No chest pain, no palpitations, no orthopnea, no peripheral edema. Per cath from 9/2018:  	LPDA: 40 % stenosis.  	Proximal RCA: The vessel was very small sized. There was a 90 % stenosis.  	Proximal LAD: There was a diffuse 30 % stenosis  Gastrointestinal: no nausea, vomiting, abdominal pain, diarrhea, or bloody stool.   Genitourinary: no dysuria , HD started only by six months while awaiting transplant General:	No fevers or chills  Skin/Breast: No rashes or lesions  Ophthalmologic: No vision changes  ENMT: No sore throat. No dysphagia	  Respiratory and Thorax: No dyspnea, cough, or wheezing  Cardiovascular: No chest pain, no palpitations, no orthopnea, no peripheral edema.   Per cath from 9/2018:  	LPDA: 40 % stenosis.  	Proximal RCA: The vessel was very small sized. There was a 90 % stenosis.  	Proximal LAD: There was a diffuse 30 % stenosis  Per Diagnostic testing 06/2019  Echo 6/5/19  CORONARY VESSELS: The coronary circulation is left dominant.  LM:   --  LM: Angiography showed minor luminal irregularities with no flow  limiting lesions.  LAD:   --  Proximal LAD: There was a 80 % stenosis.  CX:   --  Circumflex: Angiography showed moderate atherosclerosis.  --  OM1: There was a 0 % stenosis at the site of a prior stent.  RCA:   --  Proximal RCA: The vessel was very small sized. Angiography  showed severe atherosclerosis.  Gastrointestinal: no nausea, vomiting, abdominal pain, diarrhea, or bloody stool.   Genitourinary: no dysuria , HD started only by six months while awaiting transplant

## 2019-12-14 NOTE — H&P ADULT - NSHPLABSRESULTS_GEN_ALL_CORE
CBC Full  -  ( 13 Dec 2019 23:13 ) WBC Count : 8.77 K/uL  RBC Count : 4.05 M/uL  Hemoglobin : 11.7 g/dL  Hematocrit : 35.5 %  Platelet Count - Automated : 294 K/uL  Mean Cell Volume : 87.7 fl  Mean Cell Hemoglobin : 28.9 pg  Mean Cell Hemoglobin Concentration : 33.0 gm/dL  Auto Neutrophil # : 5.71 K/uL  Auto Lymphocyte # : 2.35 K/uL  Auto Monocyte # : 0.48 K/uL  Auto Eosinophil # : 0.11 K/uL  Auto Basophil # : 0.02 K/uL  Auto Neutrophil % : 65.1 %  Auto Lymphocyte % : 26.8 %  Auto Monocyte % : 5.5 %  Auto Eosinophil % : 1.3 %  Auto Basophil % : 0.2 %               11.7   8.77  )-----------( 294      ( 13 Dec 2019 23:13 )             35.5     134<L>  |  98  |  22  ----------------------------<  150<H>  3.7   |  27  |  3.22<H>    Ca    10.3<H>      13 Dec 2019 23:13  TPro  9.4<H>  /  Alb  3.7  /  TBili  0.3  /  DBili  x   /  AST  21  /  ALT  16  /  AlkPhos  146<H>  12-13    LIVER FUNCTIONS - ( 13 Dec 2019 23:13 )  Alb: 3.7 g/dL / Pro: 9.4 gm/dL / ALK PHOS: 146 U/L / ALT: 16 U/L / AST: 21 U/L / GGT: x         PT/INR - ( 13 Dec 2019 23:13 )   PT: 12.1 sec;   INR: 1.08 ratio    PTT - ( 13 Dec 2019 23:13 )  PTT:31.5 sec  Urinalysis Basic - ( 14 Dec 2019 01:09 )  Color: Yellow / Appearance: Clear / S.005 / pH: x  Gluc: x / Ketone: Negative  / Bili: Negative / Urobili: Negative mg/dL   Blood: x / Protein: 500 mg/dL / Nitrite: Negative   Leuk Esterase: Negative / RBC: 3-5 /HPF / WBC 0-2   Sq Epi: x / Non Sq Epi: Occasional / Bacteria: Few    CARDIAC MARKERS ( 13 Dec 2019 23:13 )  .041 ng/mL / x     / x     / x     / 1.8 ng/mL  ABG - ( 13 Dec 2019 23:40 )  pH, Arterial: x     pH, Blood: 7.46  /  pCO2: 38    /  pO2: 160   / HCO3: 26    / Base Excess: 2.8   /  SaO2: 99      RADIOLOGY RESULTS: noncontrast CT of the head from 2019.     FINDINGS:   Evaluation is degraded by motion.     The aortic arch is incompletely imaged. The common carotid arteries are   widely patent from the origins to the bifurcations. There is mild to   moderate atherosclerotic calcification at the left common carotid artery   bifurcation resulting in up to 30% stenosis of the proximal left internal   carotid artery based on NASCET criteria. On the right, there is no   significant atherosclerotic disease at the common carotid artery   bifurcation. There is no stenosis of the cervical right internal carotid   artery based on NASCET criteria. The cervical vertebral arteries are patent   from the origins to the skull base. The right vertebral artery is slightly   dominant.     The skull base and intracranial carotid arteries are patent without   high-grade stenosis. There is atherosclerotic calcification of the cavernous   and supraclinoid segments resulting in mild luminal narrowing. The proximal   MCAs and ACAs are patent without significant stenosis. Distal EMILI and MCA   branches are poorly evaluated due to motion artifact. The anterior   communicating artery is visualized.     The skull base and intradural vertebral arteries and basilar artery are   patent without significant stenosis. The proximal PCAs are patent without   significant stenosis. The posterior communicating arteries are extremely   hypoplastic or absent. The superior cerebellar artery origins, bilateral   AICAs, and bilateral PICA's are identified.     There is no evidence of an intracranial arterial aneurysm or arteriovenous   malformation within the confines of this exam.     The regional soft tissues of the neck are otherwise unremarkable. The lung   apices demonstrate an incompletely visualized nodule in the right lung apex   measuring up to 5 mm. There are mild degenerative changes of the spine.     IMPRESSION:     Motion degraded exam.     CTA NECK: Mild stenosis of the proximal left internal carotid artery of up   to 30% based on NASCET criteria. No stenosis of the cervical right carotid   artery based on NASCET criteria. Patent cervical vertebral arteries.     CTA HEAD: No significant stenosis or occlusion of the major proximal   arterial branches

## 2019-12-14 NOTE — H&P ADULT - NSHPOUTPATIENTPROVIDERS_GEN_ALL_CORE
Bharat Maloney- Cardiology  Daniel Perez-Renal Transplant  Renal- Dr. Cardenas, Dr. Waters  PCP- Berenice Morejon

## 2019-12-14 NOTE — CHART NOTE - NSCHARTNOTEFT_GEN_A_CORE
HPI:  63 yo male with ESRD on HD (M-W-FRI without AVF as awaiting renal transplant expected within 3 months), CAD s/p stent 2009 and abnormal cath 9/2018 with 90% pRCA lesion (not intervened upon) with placement of drug eluting stent everolimus Synergy  3x 28 mm in June 2019, HTN, DM type 2, hypothyroidism and left carotid stenosis presents to the ED for altered mental status. HPI proceeded today by dialysis with 2.5 liters removed. Patient complaint of weakness last noted normal at 21:15. EMS contactd ny wife when patient noted unresponsive, aphasic and not moving extremities. TpA administered on 00:16 am and patient then noted moving extremities with left sided weakness. His son state that his bp normally runs high, up to 's. for cardiac cath. Pt is awaiting renal transplant, May 2019 transplant deferred for  cardiac cath as per his PMD and cardiologist prior to being cleared for transplant surgery. Patient denies chest pain, palpitations, dyspnea, headaches, or lower extremity edema. Pt and his son state that his bp normally runs high, up to 's. (14 Dec 2019 02:39)    Day 1 at present patient receiving dialysis. Ct Scans reviewed with Dr. Sena. Current management will continue 3% NS in process. Pupils sluggish to brisk reactivity. Patient with minimal reponsiveness. At 24 hours, NGT to be placeed nicardipine drip to be replaced with nimotopp 60 mg po q6h to reduce vasospasm. MRI in the am     Reviewed neurology recommendations with attending Dr. Montoya.  Patient currently stable HPI:  63 yo male with ESRD on HD (M-W-FRI without AVF as awaiting renal transplant expected within 3 months), CAD s/p stent 2009 and abnormal cath 9/2018 with 90% pRCA lesion (not intervened upon) with placement of drug eluting stent everolimus Synergy  3x 28 mm in June 2019, HTN, DM type 2, hypothyroidism and left carotid stenosis presents to the ED for altered mental status. HPI proceeded today by dialysis with 2.5 liters removed. Patient complaint of weakness last noted normal at 21:15. EMS contactd ny wife when patient noted unresponsive, aphasic and not moving extremities. TpA administered on 00:16 am and patient then noted moving extremities with left sided weakness. His son state that his bp normally runs high, up to 's. for cardiac cath. Pt is awaiting renal transplant, May 2019 transplant deferred for  cardiac cath as per his PMD and cardiologist prior to being cleared for transplant surgery. Patient denies chest pain, palpitations, dyspnea, headaches, or lower extremity edema. Pt and his son state that his bp normally runs high, up to 's. (14 Dec 2019 02:39)    Day 1 at present patient receiving dialysis. Ct Scans reviewed with Dr. Sena. Current management will continue 3% NS in process. Pupils brisk reactivity. Patient with minimal reponsiveness. At 24 hours, NGT to be placeed nicardipine drip to be replaced with nimotopp 60 mg po q6h to reduce vasospasm. MRI in the am     Reviewed neurology recommendations with attending Dr. Montoya.  Patient currently stable

## 2019-12-14 NOTE — CONSULT NOTE ADULT - SUBJECTIVE AND OBJECTIVE BOX
[facility] [facility]   MD Yokasta Gary D.O. Fatima Sheikh, D.O. Ruoru Wong, PA    From 7 AM - 5 PM:  OFFICE: [phone]  Dr. Barrientos cell: [phone]  Dr. Lopes cell: [phone]  Dr. Witt cell: [phone]  WALDO Valentin cell: [phone]    From 5 PM - 7 AM: Answering Service: [phone]      -- INITIAL RENAL CONSULT NOTE  --------------------------------------------------------------------------------  HPI: 63 yo male with ESRD on HD (M-W-FRI without AVF as awaiting renal transplant expected within 3 months), CAD s/p stent 2009 and abnormal cath 9/2018 with 90% pRCA lesion (not intervened upon) with placement of drug eluting stent everolimus Synergy  3x 28 mm in June 2019, HTN, DM type 2, hypothyroidism and left carotid stenosis admitted for altered mental status. Pt known to Dr. Barrientos and receives his HD at [facility]. Last HD yesterday prior to transfer. Pt became unresponsive and arm not moving therefore he was sent to hospital. Pt last Hb was 8.5 on 12/2/19. He was receiving Arnanesp 60mcg every wednesday with venofer 100mg with each HD. Pt seen and examined. Non-responsive at present.     PAST HISTORY  --------------------------------------------------------------------------------  PAST MEDICAL & SURGICAL HISTORY:  Hypothyroidism  CRI (Chronic Renal Insufficiency)  CAD (Coronary Artery Disease): with Stents in 06/2009 and 6/2019  Dyslipidemia  Diabetes  Hypertension  History of insertion of stent into coronary artery bypass graft    FAMILY HISTORY:  FH: HTN (hypertension): Father    PAST SOCIAL HISTORY:    ALLERGIES & MEDICATIONS  --------------------------------------------------------------------------------  Allergies    No Known Allergies    Intolerances      Standing Inpatient Medications  atorvastatin 80 milliGRAM(s) Oral at bedtime  dextrose 5%. 1000 milliLiter(s) IV Continuous <Continuous>  dextrose 5%. 1000 milliLiter(s) IV Continuous <Continuous>  dextrose 50% Injectable 12.5 Gram(s) IV Push once  hydrALAZINE Injectable 5 milliGRAM(s) IV Push every 6 hours  insulin lispro (HumaLOG) corrective regimen sliding scale   SubCutaneous every 6 hours  levothyroxine Injectable 25 MICROGram(s) IV Push at bedtime  losartan 100 milliGRAM(s) Oral daily  niCARdipine Infusion 5 mG/Hr IV Continuous <Continuous>  pantoprazole  Injectable 40 milliGRAM(s) IV Push daily    PRN Inpatient Medications  dextrose 40% Gel 15 Gram(s) Oral once PRN  glucagon  Injectable 1 milliGRAM(s) IntraMuscular once PRN  metoprolol tartrate Injectable 2.5 milliGRAM(s) IV Push every 6 hours PRN      REVIEW OF SYSTEMS  --------------------------------------------------------------------------------  Unable to obtain     All other systems were reviewed and are negative, except as noted.    VITALS/PHYSICAL EXAM  --------------------------------------------------------------------------------  T(C): 36.5 (12-14-19 @ 07:20), Max: 37 (12-14-19 @ 04:45)  HR: 99 (12-14-19 @ 11:00) (73 - 100)  BP: 179/73 (12-14-19 @ 11:00) (137/62 - 249/114)  RR: 33 (12-14-19 @ 11:00) (13 - 33)  SpO2: 100% (12-14-19 @ 11:00) (97% - 100%)  Wt(kg): --  Height (cm): 177.8 (12-14-19 @ 02:09)  Weight (kg): 69.3 (12-14-19 @ 02:09)  BMI (kg/m2): 21.9 (12-14-19 @ 02:09)  BSA (m2): 1.86 (12-14-19 @ 02:09)      12-13-19 @ 07:01  -  12-14-19 @ 07:00  --------------------------------------------------------  IN: 134 mL / OUT: 225 mL / NET: -91 mL    12-14-19 @ 07:01  -  12-14-19 @ 11:36  --------------------------------------------------------  IN: 130 mL / OUT: 10 mL / NET: 120 mL      Physical Exam:  	Gen: NAD  	HEENT: MMM  	Pulm: CTA B/L  	CV: S1S2  	Abd: Soft, +BS   	Ext: No LE edema B/L  	Neuro: Non-responsive   	Skin: Warm and dry  	Vascular access: + tunneled HD catheter     LABS/STUDIES  --------------------------------------------------------------------------------              10.8   24.14 >-----------<  279      [12-14-19 @ 06:30]              33.8     126  |  93  |  34  ----------------------------<  322      [12-14-19 @ 06:30]  6.5   |  22  |  4.30        Ca     9.2     [12-14-19 @ 06:30]      Mg     2.0     [12-14-19 @ 06:30]    TPro  8.1  /  Alb  3.3  /  TBili  0.5  /  DBili  x   /  AST  16  /  ALT  14  /  AlkPhos  132  [12-14-19 @ 06:30]    PT/INR: PT 12.1 , INR 1.08       [12-13-19 @ 23:13]  PTT: 31.5       [12-13-19 @ 23:13]    Troponin .195      [12-14-19 @ 06:30]  CK 86      [12-14-19 @ 06:30]    Creatinine Trend:  SCr 4.30 [12-14 @ 06:30]  SCr 3.22 [12-13 @ 23:13]    Urinalysis - [12-14-19 @ 01:09]      Color Yellow / Appearance Clear / SG 1.005 / pH 7.0      Gluc 50 / Ketone Negative  / Bili Negative / Urobili Negative       Blood Trace / Protein 500 / Leuk Est Negative / Nitrite Negative      RBC 3-5 / WBC 0-2 / Hyaline  / Gran  / Sq Epi  / Non Sq Epi Occasional / Bacteria Few      Iron 26, TIBC 205, %sat 13      [06-04-19 @ 09:27]  Ferritin 99      [06-04-19 @ 09:33]  PTH -- (Ca 6.9)      [06-05-19 @ 08:14]   562  HbA1c 5.8      [06-06-19 @ 09:20]  TSH 1.160      [12-14-19 @ 06:30]    HBsAb >1000.0      [06-03-19 @ 15:57]  HBsAb Reactive      [03-13-17 @ 19:00]  HBsAg Nonreact      [06-03-19 @ 15:57]  HBcAb Reactive      [06-03-19 @ 15:57]  HCV 0.13, Nonreact      [06-03-19 @ 15:57]  HIV Nonreact      [03-13-17 @ 19:00]

## 2019-12-15 LAB
ALBUMIN SERPL ELPH-MCNC: 2.5 G/DL — LOW (ref 3.3–5)
ALP SERPL-CCNC: 105 U/L — SIGNIFICANT CHANGE UP (ref 40–120)
ALT FLD-CCNC: 15 U/L — SIGNIFICANT CHANGE UP (ref 12–78)
ANION GAP SERPL CALC-SCNC: 10 MMOL/L — SIGNIFICANT CHANGE UP (ref 5–17)
ANION GAP SERPL CALC-SCNC: 7 MMOL/L — SIGNIFICANT CHANGE UP (ref 5–17)
AST SERPL-CCNC: 45 U/L — HIGH (ref 15–37)
BILIRUB SERPL-MCNC: 0.3 MG/DL — SIGNIFICANT CHANGE UP (ref 0.2–1.2)
BUN SERPL-MCNC: 37 MG/DL — HIGH (ref 7–23)
BUN SERPL-MCNC: 50 MG/DL — HIGH (ref 7–23)
CALCIUM SERPL-MCNC: 7.8 MG/DL — LOW (ref 8.5–10.1)
CALCIUM SERPL-MCNC: 8 MG/DL — LOW (ref 8.5–10.1)
CHLORIDE SERPL-SCNC: 103 MMOL/L — SIGNIFICANT CHANGE UP (ref 96–108)
CHLORIDE SERPL-SCNC: 109 MMOL/L — HIGH (ref 96–108)
CHOLEST SERPL-MCNC: 134 MG/DL — SIGNIFICANT CHANGE UP (ref 10–199)
CK MB BLD-MCNC: 4.4 % — HIGH (ref 0–3.5)
CK MB CFR SERPL CALC: 11.6 NG/ML — HIGH (ref 0.5–3.6)
CK SERPL-CCNC: 264 U/L — SIGNIFICANT CHANGE UP (ref 26–308)
CO2 SERPL-SCNC: 22 MMOL/L — SIGNIFICANT CHANGE UP (ref 22–31)
CO2 SERPL-SCNC: 26 MMOL/L — SIGNIFICANT CHANGE UP (ref 22–31)
CREAT SERPL-MCNC: 4.39 MG/DL — HIGH (ref 0.5–1.3)
CREAT SERPL-MCNC: 5.7 MG/DL — HIGH (ref 0.5–1.3)
CULTURE RESULTS: NO GROWTH — SIGNIFICANT CHANGE UP
ERYTHROCYTE [SEDIMENTATION RATE] IN BLOOD: 45 MM/HR — HIGH (ref 0–20)
GLUCOSE BLDC GLUCOMTR-MCNC: 258 MG/DL — HIGH (ref 70–99)
GLUCOSE BLDC GLUCOMTR-MCNC: 320 MG/DL — HIGH (ref 70–99)
GLUCOSE BLDC GLUCOMTR-MCNC: 340 MG/DL — HIGH (ref 70–99)
GLUCOSE SERPL-MCNC: 274 MG/DL — HIGH (ref 70–99)
GLUCOSE SERPL-MCNC: 343 MG/DL — HIGH (ref 70–99)
HAV IGM SER-ACNC: SIGNIFICANT CHANGE UP
HBA1C BLD-MCNC: 8.4 % — HIGH (ref 4–5.6)
HBV CORE IGM SER-ACNC: SIGNIFICANT CHANGE UP
HBV SURFACE AB SER-ACNC: >1000 MIU/ML — SIGNIFICANT CHANGE UP
HBV SURFACE AG SER-ACNC: SIGNIFICANT CHANGE UP
HCT VFR BLD CALC: 29.4 % — LOW (ref 39–50)
HCV AB S/CO SERPL IA: 0.15 S/CO — SIGNIFICANT CHANGE UP (ref 0–0.99)
HCV AB SERPL-IMP: SIGNIFICANT CHANGE UP
HDLC SERPL-MCNC: 56 MG/DL — SIGNIFICANT CHANGE UP
HGB BLD-MCNC: 9.4 G/DL — LOW (ref 13–17)
LIPID PNL WITH DIRECT LDL SERPL: 52 MG/DL — SIGNIFICANT CHANGE UP
MAGNESIUM SERPL-MCNC: 2 MG/DL — SIGNIFICANT CHANGE UP (ref 1.6–2.6)
MCHC RBC-ENTMCNC: 28.8 PG — SIGNIFICANT CHANGE UP (ref 27–34)
MCHC RBC-ENTMCNC: 32 GM/DL — SIGNIFICANT CHANGE UP (ref 32–36)
MCV RBC AUTO: 90.2 FL — SIGNIFICANT CHANGE UP (ref 80–100)
NRBC # BLD: 0 /100 WBCS — SIGNIFICANT CHANGE UP (ref 0–0)
PHOSPHATE SERPL-MCNC: 2.2 MG/DL — LOW (ref 2.5–4.5)
PLATELET # BLD AUTO: 228 K/UL — SIGNIFICANT CHANGE UP (ref 150–400)
POTASSIUM SERPL-MCNC: 4.4 MMOL/L — SIGNIFICANT CHANGE UP (ref 3.5–5.3)
POTASSIUM SERPL-MCNC: 4.4 MMOL/L — SIGNIFICANT CHANGE UP (ref 3.5–5.3)
POTASSIUM SERPL-SCNC: 4.4 MMOL/L — SIGNIFICANT CHANGE UP (ref 3.5–5.3)
POTASSIUM SERPL-SCNC: 4.4 MMOL/L — SIGNIFICANT CHANGE UP (ref 3.5–5.3)
PROT SERPL-MCNC: 7 GM/DL — SIGNIFICANT CHANGE UP (ref 6–8.3)
RBC # BLD: 3.26 M/UL — LOW (ref 4.2–5.8)
RBC # FLD: 19.2 % — HIGH (ref 10.3–14.5)
SODIUM SERPL-SCNC: 135 MMOL/L — SIGNIFICANT CHANGE UP (ref 135–145)
SODIUM SERPL-SCNC: 142 MMOL/L — SIGNIFICANT CHANGE UP (ref 135–145)
SPECIMEN SOURCE: SIGNIFICANT CHANGE UP
TOTAL CHOLESTEROL/HDL RATIO MEASUREMENT: 2.4 RATIO — LOW (ref 3.4–9.6)
TRIGL SERPL-MCNC: 129 MG/DL — SIGNIFICANT CHANGE UP (ref 10–149)
TROPONIN I SERPL-MCNC: 4.4 NG/ML — HIGH (ref 0.01–0.04)
WBC # BLD: 17.08 K/UL — HIGH (ref 3.8–10.5)
WBC # FLD AUTO: 17.08 K/UL — HIGH (ref 3.8–10.5)

## 2019-12-15 PROCEDURE — 71045 X-RAY EXAM CHEST 1 VIEW: CPT | Mod: 26,77

## 2019-12-15 PROCEDURE — 71045 X-RAY EXAM CHEST 1 VIEW: CPT | Mod: 26

## 2019-12-15 PROCEDURE — 93306 TTE W/DOPPLER COMPLETE: CPT | Mod: 26

## 2019-12-15 PROCEDURE — 70551 MRI BRAIN STEM W/O DYE: CPT | Mod: 26

## 2019-12-15 PROCEDURE — 70450 CT HEAD/BRAIN W/O DYE: CPT | Mod: 26

## 2019-12-15 PROCEDURE — 99291 CRITICAL CARE FIRST HOUR: CPT

## 2019-12-15 RX ORDER — LEVETIRACETAM 250 MG/1
500 TABLET, FILM COATED ORAL EVERY 12 HOURS
Refills: 0 | Status: DISCONTINUED | OUTPATIENT
Start: 2019-12-15 | End: 2019-12-15

## 2019-12-15 RX ORDER — LEVETIRACETAM 250 MG/1
1000 TABLET, FILM COATED ORAL ONCE
Refills: 0 | Status: COMPLETED | OUTPATIENT
Start: 2019-12-15 | End: 2019-12-15

## 2019-12-15 RX ORDER — LACOSAMIDE 50 MG/1
50 TABLET ORAL EVERY 12 HOURS
Refills: 0 | Status: DISCONTINUED | OUTPATIENT
Start: 2019-12-15 | End: 2019-12-17

## 2019-12-15 RX ORDER — CEFEPIME 1 G/1
1000 INJECTION, POWDER, FOR SOLUTION INTRAMUSCULAR; INTRAVENOUS EVERY 24 HOURS
Refills: 0 | Status: DISCONTINUED | OUTPATIENT
Start: 2019-12-16 | End: 2019-12-15

## 2019-12-15 RX ORDER — CARVEDILOL PHOSPHATE 80 MG/1
6.25 CAPSULE, EXTENDED RELEASE ORAL EVERY 12 HOURS
Refills: 0 | Status: DISCONTINUED | OUTPATIENT
Start: 2019-12-15 | End: 2019-12-15

## 2019-12-15 RX ORDER — CEFEPIME 1 G/1
1000 INJECTION, POWDER, FOR SOLUTION INTRAMUSCULAR; INTRAVENOUS ONCE
Refills: 0 | Status: COMPLETED | OUTPATIENT
Start: 2019-12-15 | End: 2019-12-15

## 2019-12-15 RX ORDER — LEVETIRACETAM 250 MG/1
750 TABLET, FILM COATED ORAL EVERY 12 HOURS
Refills: 0 | Status: DISCONTINUED | OUTPATIENT
Start: 2019-12-15 | End: 2019-12-17

## 2019-12-15 RX ORDER — ACETAMINOPHEN 500 MG
650 TABLET ORAL EVERY 6 HOURS
Refills: 0 | Status: DISCONTINUED | OUTPATIENT
Start: 2019-12-15 | End: 2019-12-19

## 2019-12-15 RX ORDER — CEFEPIME 1 G/1
INJECTION, POWDER, FOR SOLUTION INTRAMUSCULAR; INTRAVENOUS
Refills: 0 | Status: DISCONTINUED | OUTPATIENT
Start: 2019-12-15 | End: 2019-12-15

## 2019-12-15 RX ORDER — INSULIN LISPRO 100/ML
VIAL (ML) SUBCUTANEOUS EVERY 6 HOURS
Refills: 0 | Status: DISCONTINUED | OUTPATIENT
Start: 2019-12-15 | End: 2019-12-17

## 2019-12-15 RX ADMIN — CARVEDILOL PHOSPHATE 6.25 MILLIGRAM(S): 80 CAPSULE, EXTENDED RELEASE ORAL at 05:58

## 2019-12-15 RX ADMIN — Medication 650 MILLIGRAM(S): at 12:06

## 2019-12-15 RX ADMIN — Medication 2 MILLIGRAM(S): at 18:43

## 2019-12-15 RX ADMIN — Medication 650 MILLIGRAM(S): at 05:30

## 2019-12-15 RX ADMIN — LEVETIRACETAM 400 MILLIGRAM(S): 250 TABLET, FILM COATED ORAL at 18:09

## 2019-12-15 RX ADMIN — Medication 4: at 05:58

## 2019-12-15 RX ADMIN — Medication 2 MILLIGRAM(S): at 10:02

## 2019-12-15 RX ADMIN — Medication 650 MILLIGRAM(S): at 18:40

## 2019-12-15 RX ADMIN — Medication 650 MILLIGRAM(S): at 04:35

## 2019-12-15 RX ADMIN — NIMODIPINE 60 MILLIGRAM(S): 60 SOLUTION ORAL at 22:06

## 2019-12-15 RX ADMIN — Medication 650 MILLIGRAM(S): at 20:00

## 2019-12-15 RX ADMIN — Medication 4: at 11:54

## 2019-12-15 RX ADMIN — NIMODIPINE 60 MILLIGRAM(S): 60 SOLUTION ORAL at 02:09

## 2019-12-15 RX ADMIN — Medication 650 MILLIGRAM(S): at 13:00

## 2019-12-15 RX ADMIN — NIMODIPINE 60 MILLIGRAM(S): 60 SOLUTION ORAL at 10:55

## 2019-12-15 RX ADMIN — LACOSAMIDE 110 MILLIGRAM(S): 50 TABLET ORAL at 19:58

## 2019-12-15 RX ADMIN — CEFEPIME 100 MILLIGRAM(S): 1 INJECTION, POWDER, FOR SOLUTION INTRAMUSCULAR; INTRAVENOUS at 05:02

## 2019-12-15 RX ADMIN — PANTOPRAZOLE SODIUM 40 MILLIGRAM(S): 20 TABLET, DELAYED RELEASE ORAL at 11:45

## 2019-12-15 RX ADMIN — NIMODIPINE 60 MILLIGRAM(S): 60 SOLUTION ORAL at 15:34

## 2019-12-15 RX ADMIN — ATORVASTATIN CALCIUM 80 MILLIGRAM(S): 80 TABLET, FILM COATED ORAL at 22:07

## 2019-12-15 RX ADMIN — Medication 6: at 18:08

## 2019-12-15 RX ADMIN — LEVETIRACETAM 420 MILLIGRAM(S): 250 TABLET, FILM COATED ORAL at 05:58

## 2019-12-15 RX ADMIN — LEVETIRACETAM 400 MILLIGRAM(S): 250 TABLET, FILM COATED ORAL at 02:34

## 2019-12-15 RX ADMIN — Medication 25 MICROGRAM(S): at 22:06

## 2019-12-15 RX ADMIN — NIMODIPINE 60 MILLIGRAM(S): 60 SOLUTION ORAL at 05:58

## 2019-12-15 NOTE — PROVIDER CONTACT NOTE (EICU) - ASSESSMENT
64 male with above hx s/p tpa for stoke symptoms and mri showing acute infarcts currently on keppra but remains with sz activity,

## 2019-12-15 NOTE — CHART NOTE - NSCHARTNOTEFT_GEN_A_CORE
HPI:  65 yo male with ESRD on HD (M-W-FRI without AVF as awaiting renal transplant expected within 3 months), CAD s/p stent 2009 and abnormal cath 9/2018 with 90% pRCA lesion (not intervened upon) with placement of drug eluting stent everolimus Synergy  3x 28 mm in June 2019, HTN, DM type 2, hypothyroidism and left carotid stenosis presents to the ED for altered mental status. HPI proceeded today by dialysis with 2.5 liters removed. Patient complaint of weakness last noted normal at 21:15. EMS contacted by wife when patient noted unresponsive, aphasic and not moving extremities. TpA administered on 00:16 am and patient then noted moving extremities with left sided weakness. His son state that his bp normally runs high, up to 's. for cardiac cath. Pt is awaiting renal transplant, May 2019 transplant deferred for  cardiac cath as per his PMD and cardiologist prior to being cleared for transplant surgery. Patient denies chest pain, palpitations, dyspnea, headaches, or lower extremity edema. Pt and his son state that his bp normally runs high, up to 's. (14 Dec 2019 02:39)    E ICU notified of seizure 40 second in duration by TAO Sanchez. Patient with generalized movements of left arm, leg, with right leg. Event occurred prior to NGT administration of Nimotop for prevention of cerebral vasospam . E-ICU administered IV Keppra     Patient now post ictal     Vital Signs Last 24 Hrs  T(C): 37.6 (14 Dec 2019 23:39), Max: 37.6 (14 Dec 2019 23:39)  T(F): 99.6 (14 Dec 2019 23:39), Max: 99.6 (14 Dec 2019 23:39) now 101.5   HR: 94 (15 Dec 2019 03:30) (84 - 104)  BP: 147/54 (15 Dec 2019 03:30) (127/50 - 186/72)  BP(mean): 77 (15 Dec 2019 02:30) (69 - 101)  RR: 32 (15 Dec 2019 03:30) (13 - 41)  SpO2: 99% (15 Dec 2019 03:30) (94% - 100%)    Gen: patient post ictal responses to pain  ENT: Gaze midline, Right eye fasiculations, minima nystagmus  Facial- left labial fold and lower facial droop  Cardiac S1/S2  Lungs_ clear to auscultation  Abd benign  Ext no edema     Lab Results:  CBC  CBC Full  -  ( 14 Dec 2019 22:30 )  WBC Count : 21.41 K/uL  RBC Count : 3.47 M/uL  Hemoglobin : 9.7 g/dL  Hematocrit : 30.4 %  Platelet Count - Automated : 270 K/uL  Mean Cell Volume : 87.6 fl  Mean Cell Hemoglobin : 28.0 pg  Mean Cell Hemoglobin Concentration : 31.9 gm/dL      .		Differential:	[] Automated		[] Manual  Chemistry                        9.7    21.41 )-----------( 270      ( 14 Dec 2019 22:30 )             30.4     12-14    135  |  99  |  23  ----------------------------<  192<H>  4.1   |  25  |  3.30<H>    Ca    8.5      14 Dec 2019 22:30  Phos  2.5     12-14  Mg     2.0     12-14    TPro  7.3  /  Alb  2.9<L>  /  TBili  0.5  /  DBili  x   /  AST  54<H>  /  ALT  15  /  AlkPhos  116  12-14    LIVER FUNCTIONS - ( 14 Dec 2019 22:30 )  Alb: 2.9 g/dL / Pro: 7.3 gm/dL / ALK PHOS: 116 U/L / ALT: 15 U/L / AST: 54 U/L / GGT: x           PT/INR - ( 14 Dec 2019 22:30 )   PT: 13.0 sec;   INR: 1.16 ratio      CARDIAC MARKERS ( 14 Dec 2019 22:30 )  6.760 ng/mL / x     / 377 U/L / x     / 31.7 ng/mL  CARDIAC MARKERS ( 14 Dec 2019 06:30 )  .195 ng/mL / x     / 86 U/L / x     / 3.7 ng/mL  CARDIAC MARKERS ( 13 Dec 2019 23:13 )  .041 ng/mL / x     / x     / x     / 1.8 ng/mL      RADIOLOGY RESULTS: 0251- CT Scan with artifact and movement. Repeated 0315- no significant change    Post ictal Seizure with SAH- awaiting am EEG, MRI      continue keppra 1000 Q12h. Continue Cardene with taper prior to next dose of Nimotopp  NSTEMI- Maintain strict normotensive control HPI:  63 yo male with ESRD on HD (M-W-FRI without AVF as awaiting renal transplant expected within 3 months), CAD s/p stent 2009 and abnormal cath 9/2018 with 90% pRCA lesion (not intervened upon) with placement of drug eluting stent everolimus Synergy  3x 28 mm in June 2019, HTN, DM type 2, hypothyroidism and left carotid stenosis presents to the ED for altered mental status. HPI proceeded today by dialysis with 2.5 liters removed. Patient complaint of weakness last noted normal at 21:15. EMS contacted by wife when patient noted unresponsive, aphasic and not moving extremities. TpA administered on 00:16 am and patient then noted moving extremities with left sided weakness. His son state that his bp normally runs high, up to 's. for cardiac cath. Pt is awaiting renal transplant, May 2019 transplant deferred for  cardiac cath as per his PMD and cardiologist prior to being cleared for transplant surgery. Patient denies chest pain, palpitations, dyspnea, headaches, or lower extremity edema. Pt and his son state that his bp normally runs high, up to 's. (14 Dec 2019 02:39)    E ICU notified of seizure 40 second in duration by TAO Sanchez. Patient with generalized movements of left arm, leg, with right leg. Event occurred prior to NGT administration of Nimotop for prevention of cerebral vasospam . E-ICU administered IV Keppra     Patient now post ictal     Vital Signs Last 24 Hrs  T(C): 37.6 (14 Dec 2019 23:39), Max: 37.6 (14 Dec 2019 23:39)  T(F): 99.6 (14 Dec 2019 23:39), Max: 99.6 (14 Dec 2019 23:39) now 101.5   HR: 94 (15 Dec 2019 03:30) (84 - 104)  BP: 147/54 (15 Dec 2019 03:30) (127/50 - 186/72)  BP(mean): 77 (15 Dec 2019 02:30) (69 - 101)  RR: 32 (15 Dec 2019 03:30) (13 - 41)  SpO2: 99% (15 Dec 2019 03:30) (94% - 100%)    Gen: patient post ictal responses to pain  ENT: Gaze midline, Right eye fasiculations, minima nystagmus  Facial- left labial fold and lower facial droop  Cardiac S1/S2  Lungs_ clear to auscultation  Abd benign  Ext no edema     Lab Results:  CBC  CBC Full  -  ( 14 Dec 2019 22:30 )  WBC Count : 21.41 K/uL  RBC Count : 3.47 M/uL  Hemoglobin : 9.7 g/dL  Hematocrit : 30.4 %  Platelet Count - Automated : 270 K/uL  Mean Cell Volume : 87.6 fl  Mean Cell Hemoglobin : 28.0 pg  Mean Cell Hemoglobin Concentration : 31.9 gm/dL      .		Differential:	[] Automated		[] Manual  Chemistry                        9.7    21.41 )-----------( 270      ( 14 Dec 2019 22:30 )             30.4     12-14    135  |  99  |  23  ----------------------------<  192<H>  4.1   |  25  |  3.30<H>    Ca    8.5      14 Dec 2019 22:30  Phos  2.5     12-14  Mg     2.0     12-14    TPro  7.3  /  Alb  2.9<L>  /  TBili  0.5  /  DBili  x   /  AST  54<H>  /  ALT  15  /  AlkPhos  116  12-14    LIVER FUNCTIONS - ( 14 Dec 2019 22:30 )  Alb: 2.9 g/dL / Pro: 7.3 gm/dL / ALK PHOS: 116 U/L / ALT: 15 U/L / AST: 54 U/L / GGT: x           PT/INR - ( 14 Dec 2019 22:30 )   PT: 13.0 sec;   INR: 1.16 ratio      CARDIAC MARKERS ( 14 Dec 2019 22:30 )  6.760 ng/mL / x     / 377 U/L / x     / 31.7 ng/mL  CARDIAC MARKERS ( 14 Dec 2019 06:30 )  .195 ng/mL / x     / 86 U/L / x     / 3.7 ng/mL  CARDIAC MARKERS ( 13 Dec 2019 23:13 )  .041 ng/mL / x     / x     / x     / 1.8 ng/mL      RADIOLOGY RESULTS: 0251- CT Scan with artifact and movement. Repeated 0315- no significant change. COMPARISON:   CT HEAD 12/14/2019 12:28 PM     FINDINGS:   Brain: Extensive multifocal irregular areas of cytotoxic edema in the   periphery   of the superior and posterior brain bilaterally, most compatible in   appearance   with embolic infarctions; however, infectious cerebritis/encephalitis   could   have the same appearance. Resolution of subarachnoid hemorrhage.   Midline shift: No midline shift.   Ventricles: No ventriculomegaly.   Bones/joints: Unremarkable. No acute fracture.   Sinuses: Visualized sinuses are unremarkable. No fluid levels.   Mastoid air cells: Visualized mastoid air cells are well aerated.   Soft tissues: Unremarkable.     IMPRESSION:   Extensive multifocal irregular areas of cytotoxic edema in the periphery   of the   superior and posterior brain bilaterally, most compatible in appearance   with   embolic infarctions; however, infectious cerebritis/encephalitis could   have the   same appearance. Resolution of subarachnoid hemorrhage.    Post ictal Seizure with SAH resolution- awaiting am EEG, MRI      continue keppra 1000 Q12h. Continue Cardene with taper prior to next dose of Nimotopp  NSTEMI- Maintain strict normotensive control, repeat cardiac enzymes HPI:  65 yo male with ESRD on HD (M-W-FRI without AVF as awaiting renal transplant expected within 3 months), CAD s/p stent 2009 and abnormal cath 9/2018 with 90% pRCA lesion (not intervened upon) with placement of drug eluting stent everolimus Synergy  3x 28 mm in June 2019, HTN, DM type 2, hypothyroidism and left carotid stenosis presents to the ED for altered mental status. HPI proceeded today by dialysis with 2.5 liters removed. Patient complaint of weakness last noted normal at 21:15. EMS contacted by wife when patient noted unresponsive, aphasic and not moving extremities. TpA administered on 00:16 am and patient then noted moving extremities with left sided weakness. His son state that his bp normally runs high, up to 's. for cardiac cath. Pt is awaiting renal transplant, May 2019 transplant deferred for  cardiac cath as per his PMD and cardiologist prior to being cleared for transplant surgery. Patient denies chest pain, palpitations, dyspnea, headaches, or lower extremity edema. Pt and his son state that his bp normally runs high, up to 's. (14 Dec 2019 02:39)    E ICU notified of seizure 40 second in duration by TAO Sanchez. Patient with generalized movements of right arm and right leg. Head noted turned to the right. Event occurred prior to NGT administration of Nimotop for prevention of cerebral vasospasm . E-ICU administered IV Keppra     Patient now post ictal     Vital Signs Last 24 Hrs  T(C): 37.6 (14 Dec 2019 23:39), Max: 37.6 (14 Dec 2019 23:39)  T(F): 99.6 (14 Dec 2019 23:39), Max: 99.6 (14 Dec 2019 23:39) now 101.5   HR: 94 (15 Dec 2019 03:30) (84 - 104)  BP: 147/54 (15 Dec 2019 03:30) (127/50 - 186/72)  BP(mean): 77 (15 Dec 2019 02:30) (69 - 101)  RR: 32 (15 Dec 2019 03:30) (13 - 41)  SpO2: 99% (15 Dec 2019 03:30) (94% - 100%)    Gen: patient post ictal responses to pain  ENT: Gaze midline, Right eye fasiculations, minima nystagmus  Facial- left labial fold and lower facial droop  Cardiac S1/S2  Lungs_ clear to auscultation  Abd benign  Ext no edema     Lab Results:  CBC  CBC Full  -  ( 14 Dec 2019 22:30 )  WBC Count : 21.41 K/uL  RBC Count : 3.47 M/uL  Hemoglobin : 9.7 g/dL  Hematocrit : 30.4 %  Platelet Count - Automated : 270 K/uL  Mean Cell Volume : 87.6 fl  Mean Cell Hemoglobin : 28.0 pg  Mean Cell Hemoglobin Concentration : 31.9 gm/dL      .		Differential:	[] Automated		[] Manual  Chemistry                        9.7    21.41 )-----------( 270      ( 14 Dec 2019 22:30 )             30.4     12-14    135  |  99  |  23  ----------------------------<  192<H>  4.1   |  25  |  3.30<H>    Ca    8.5      14 Dec 2019 22:30  Phos  2.5     12-14  Mg     2.0     12-14    TPro  7.3  /  Alb  2.9<L>  /  TBili  0.5  /  DBili  x   /  AST  54<H>  /  ALT  15  /  AlkPhos  116  12-14    LIVER FUNCTIONS - ( 14 Dec 2019 22:30 )  Alb: 2.9 g/dL / Pro: 7.3 gm/dL / ALK PHOS: 116 U/L / ALT: 15 U/L / AST: 54 U/L / GGT: x           PT/INR - ( 14 Dec 2019 22:30 )   PT: 13.0 sec;   INR: 1.16 ratio      CARDIAC MARKERS ( 14 Dec 2019 22:30 )  6.760 ng/mL / x     / 377 U/L / x     / 31.7 ng/mL  CARDIAC MARKERS ( 14 Dec 2019 06:30 )  .195 ng/mL / x     / 86 U/L / x     / 3.7 ng/mL  CARDIAC MARKERS ( 13 Dec 2019 23:13 )  .041 ng/mL / x     / x     / x     / 1.8 ng/mL      RADIOLOGY RESULTS: 0251- CT Scan with artifact and movement. Repeated 0315- no significant change. COMPARISON:   CT HEAD 12/14/2019 12:28 PM     FINDINGS:   Brain: Extensive multifocal irregular areas of cytotoxic edema in the   periphery   of the superior and posterior brain bilaterally, most compatible in   appearance   with embolic infarctions; however, infectious cerebritis/encephalitis   could   have the same appearance. Resolution of subarachnoid hemorrhage.   Midline shift: No midline shift.   Ventricles: No ventriculomegaly.   Bones/joints: Unremarkable. No acute fracture.   Sinuses: Visualized sinuses are unremarkable. No fluid levels.   Mastoid air cells: Visualized mastoid air cells are well aerated.   Soft tissues: Unremarkable.     IMPRESSION:   Extensive multifocal irregular areas of cytotoxic edema in the periphery   of the   superior and posterior brain bilaterally, most compatible in appearance   with   embolic infarctions; however, infectious cerebritis/encephalitis could   have the   same appearance. Resolution of subarachnoid hemorrhage.    Post ictal Seizure with SAH resolution- awaiting am EEG, MRI      continue keppra 1000 Q12h. Continue Cardene with taper prior to next dose of Nimotopp  NSTEMI- Maintain strict normotensive control, repeat cardiac enzymes

## 2019-12-15 NOTE — PHYSICAL THERAPY INITIAL EVALUATION ADULT - SPECIFY REASON(S)
Pt at this time is not a rehab candidate and is unable to actively participate in PT, therefore d/c from PT program at this time.

## 2019-12-15 NOTE — PHYSICAL THERAPY INITIAL EVALUATION ADULT - GENERAL OBSERVATIONS, REHAB EVAL
Pt was seen in supine c cardiac monitor, IV, NG tube donned, lethargic and unable to be aroused by verbal, tactile, kinetic stimulation and sternum rub. Pt kept his eyes closed,  nonverbal and no active movement.

## 2019-12-15 NOTE — PROGRESS NOTE ADULT - ASSESSMENT
65 yo male with ESRD on HD (M-W-FRI without AVF as awaiting renal transplant expected within 3 months), CAD s/p stent 2009 and abnormal cath 9/2018 with 90% pRCA lesion (not intervened upon) with placement of drug eluting stent everolimus Synergy  3x 28 mm in June 2019, HTN, DM type 2, hypothyroidism and left carotid stenosis admitted for altered mental status.    ESRD on HD  Last HD Saturday due to hyperkalemia   Plan for HD tomorrow   Consent obtained in the chart.   Continue low blood flow treatment   Will need to do dialysis dosing for antiseizure meds   On hypertonic saline as per MICU    Hyperkalemia  Improved with HD    Anemia  Hb at goal at present   Unable to use epogen due to acute/ multiple strokes    HTN  As per MICU  Will avoid hypotension/ UF with HD to avoid shifting  Controlled at bedside

## 2019-12-15 NOTE — PROGRESS NOTE ADULT - SUBJECTIVE AND OBJECTIVE BOX
[facility]   MD Yokasta Gary D.O. Fatima Sheikh, D.O. Ruoru Wong, PA    From 7 AM - 5 PM:  OFFICE: [phone]  Dr. Barrientos cell: [phone]  Dr. Lopes cell: [phone]  Dr. Witt cell: [phone]  WALDO Valentin cell: [phone]    From 5 PM - 7 AM: Answering Service: [phone]      RENAL FOLLOW UP NOTE  --------------------------------------------------------------------------------  HPI: Pt seen and examined at bedside. Non-responsive     PAST HISTORY  --------------------------------------------------------------------------------  No significant changes to PMH, PSH, FHx, SHx, unless otherwise noted    ALLERGIES & MEDICATIONS  --------------------------------------------------------------------------------  Allergies    No Known Allergies    Intolerances      Standing Inpatient Medications  atorvastatin 80 milliGRAM(s) Oral at bedtime  carvedilol 6.25 milliGRAM(s) Oral every 12 hours  dextrose 5%. 1000 milliLiter(s) IV Continuous <Continuous>  dextrose 50% Injectable 12.5 Gram(s) IV Push once  insulin lispro (HumaLOG) corrective regimen sliding scale   SubCutaneous every 6 hours  levETIRAcetam  IVPB 750 milliGRAM(s) IV Intermittent every 12 hours  levothyroxine Injectable 25 MICROGram(s) IV Push at bedtime  niCARdipine Infusion 5 mG/Hr IV Continuous <Continuous>  niMODipine 60 milliGRAM(s) Oral every 4 hours  pantoprazole  Injectable 40 milliGRAM(s) IV Push daily  sodium chloride 3%. 500 milliLiter(s) IV Continuous <Continuous>    PRN Inpatient Medications  acetaminophen    Suspension .. 650 milliGRAM(s) Oral every 6 hours PRN  dextrose 40% Gel 15 Gram(s) Oral once PRN  glucagon  Injectable 1 milliGRAM(s) IntraMuscular once PRN      REVIEW OF SYSTEMS  --------------------------------------------------------------------------------  Unable to obtain     VITALS/PHYSICAL EXAM  --------------------------------------------------------------------------------  T(C): 38 (12-15-19 @ 16:00), Max: 38.6 (12-15-19 @ 03:25)  HR: 85 (12-15-19 @ 18:00) (70 - 104)  BP: 152/48 (12-15-19 @ 18:00) (112/38 - 179/68)  RR: 18 (12-15-19 @ 18:00) (18 - 41)  SpO2: 99% (12-15-19 @ 18:00) (90% - 100%)  Wt(kg): --  Height (cm): 177.8 (12-14-19 @ 02:09)  Weight (kg): 69.3 (12-14-19 @ 02:09)  BMI (kg/m2): 21.9 (12-14-19 @ 02:09)  BSA (m2): 1.86 (12-14-19 @ 02:09)      12-14-19 @ 07:01  -  12-15-19 @ 07:00  --------------------------------------------------------  IN: 1272.5 mL / OUT: 320 mL / NET: 952.5 mL    12-15-19 @ 07:01  -  12-15-19 @ 18:45  --------------------------------------------------------  IN: 580 mL / OUT: 20 mL / NET: 560 mL      Physical Exam:  	Gen: NAD  	HEENT: MMM  	Pulm: CTA B/L  	CV: S1S2  	Abd: Soft, +BS  	Ext: No LE edema B/L                      Neuro: Non-responsive   	Skin: Warm and Dry   	Vascular access: + tunneled HD catheter     LABS/STUDIES  --------------------------------------------------------------------------------              9.4    17.08 >-----------<  228      [12-15-19 @ 05:50]              29.4     142  |  109  |  50  ----------------------------<  274      [12-15-19 @ 15:59]  4.4   |  26  |  5.70        Ca     8.0     [12-15-19 @ 15:59]      Mg     2.0     [12-15-19 @ 05:50]      Phos  2.2     [12-15-19 @ 05:50]    TPro  7.0  /  Alb  2.5  /  TBili  0.3  /  DBili  x   /  AST  45  /  ALT  15  /  AlkPhos  105  [12-15-19 @ 05:50]    PT/INR: PT 13.0 , INR 1.16       [12-14-19 @ 22:30]  PTT: 31.5       [12-13-19 @ 23:13]    Troponin 4.400      [12-15-19 @ 07:06]        [12-15-19 @ 07:06]    Creatinine Trend:  SCr 5.70 [12-15 @ 15:59]  SCr 4.39 [12-15 @ 05:50]  SCr 3.30 [12-14 @ 22:30]  SCr 5.13 [12-14 @ 12:27]  SCr 4.30 [12-14 @ 06:30]    Urinalysis - [12-14-19 @ 01:09]      Color Yellow / Appearance Clear / SG 1.005 / pH 7.0      Gluc 50 / Ketone Negative  / Bili Negative / Urobili Negative       Blood Trace / Protein 500 / Leuk Est Negative / Nitrite Negative      RBC 3-5 / WBC 0-2 / Hyaline  / Gran  / Sq Epi  / Non Sq Epi Occasional / Bacteria Few      Iron 26, TIBC 205, %sat 13      [06-04-19 @ 09:27]  Ferritin 99      [06-04-19 @ 09:33]  PTH -- (Ca 6.9)      [06-05-19 @ 08:14]   562  HbA1c 8.4      [12-15-19 @ 11:00]  TSH 1.160      [12-14-19 @ 06:30]  Lipid: chol 134, , HDL 56, LDL 52      [12-15-19 @ 10:56]    HBsAb >1000.0      [12-14-19 @ 23:48]  HBsAg Nonreact      [12-14-19 @ 23:48]  HCV 0.15, Nonreact      [12-14-19 @ 23:48]

## 2019-12-15 NOTE — PHYSICAL THERAPY INITIAL EVALUATION ADULT - ADDITIONAL COMMENTS
There are 6 steps, c dave rails,  far apart and unable to be reached simultaneously, at the entry of the house and no steps to negotiate at home. Information was obtained from son.

## 2019-12-15 NOTE — PROVIDER CONTACT NOTE (EICU) - RECOMMENDATIONS
-ativan 2mg stat ivp   -if pt persists to have sz acitivity increase keppra to 1000 q12   -will then need stat head ct if persists   - will need intubation and start of either ativan or propfol gtt  -f/u neuro
Plan:  - Repeat CT head again in 4 hour  - Continue Neuro check Q1hr  - Continue Nicardapine   - Fibrinogen levels ordered.

## 2019-12-15 NOTE — PROGRESS NOTE ADULT - ASSESSMENT
64M w/ ESRD on ED, CAD s/p PCI (6/2019), HTN, DM, hypothyroidism, L carotid stenosis. Presented w/ aphasia and AMS. Given tPA for presumed CVA. Admitted to ICU for post-tPA neuro monitoring. Repeat CTH overnight for change in exam showed hemorrhagic conversion with SAH, now with seizures.    #Neuro  - mental status remains depressed, does not open eyes or follows commands, appears to be protecting airway  - had seizure last night and this morning  - increase keppra dosing to 750 mg bid, get VEEG  - started on nimodipine to prevent vasospasm in setting of SAH  - target SBP ~140-160 to maintain cerebral perfusion  - continue w/ NaCl 3% for cerebral edema seen on imaging - target Na ~145-150  - MRI today  - hold all AC and anti-plts meds for now  - neurology f/u    #CV  - TTE performed- w/in normal limits  - goal BP ~140-160 to maintain cerebral perfusion  - continue w/ nimodipine and coreg  - hold asa and plavix  - elevated troponins that have peaked likely demand ischemia in setting of CVA and hemorrhage    #Pulm  - lethargic but appears to be protecting airway  - will get CXR to look for pneumonia in setting of fever o/n last night  - hold off on abx for now    #ID  - fever overnight to 101.5  - started on abx but more than likely this is central fever vs aspiration vs ?permcath  - will d/c abx and observe off for now  - check procalcitonin, CXR today  - all cx NGTD    #Liz/Metabolic  - ESRD on HD  - had HD yesterday, no acute indication for HD today  - D/C tucker  - keep Na 145-150 for cerebral edema; check BMP this afternoon    #GI  - NPO, NGT placed  - start TF    #Endo  - FS are elevated  - increase ISS to medium scale and will assess need to start longer acting insulin  - TSH within normal limits- continue w/ levothyrixine    #Dispo  - prognosis very guarded  - remain in ICU for neuro monitoring and 3% saline for cerebral edema

## 2019-12-15 NOTE — PROGRESS NOTE ADULT - SUBJECTIVE AND OBJECTIVE BOX
CHIEF COMPLAINT:    Interval Events:  started in nimodipine for vasospasm prevention  Had seizure overnight and was started on keppra  Had additional seizure this morning during rounds and given ativan 2 mg  fever overnight to 101.5    REVIEW OF SYSTEMS:  [ x] Unable to assess ROS because aphasic/altered    OBJECTIVE:  ICU Vital Signs Last 24 Hrs  T(C): 38.1 (15 Dec 2019 18:30), Max: 38.6 (15 Dec 2019 03:25)  T(F): 100.6 (15 Dec 2019 18:30), Max: 101.5 (15 Dec 2019 03:25)  HR: 85 (15 Dec 2019 20:00) (70 - 104)  BP: 150/55 (15 Dec 2019 20:00) (112/38 - 179/68)  BP(mean): 80 (15 Dec 2019 20:00) (55 - 96)  ABP: --  ABP(mean): --  RR: 19 (15 Dec 2019 20:00) (17 - 42)  SpO2: 100% (15 Dec 2019 20:00) (89% - 100%)         @ 07:  -  12-15 @ 07:00  --------------------------------------------------------  IN: 1272.5 mL / OUT: 320 mL / NET: 952.5 mL    12-15 @ 07:01  -  12-15 @ 20:39  --------------------------------------------------------  IN: 950 mL / OUT: 20 mL / NET: 930 mL      CAPILLARY BLOOD GLUCOSE  160 (13 Dec 2019 23:11)      POCT Blood Glucose.: 258 mg/dL (15 Dec 2019 17:13)      PHYSICAL EXAM:  General: NAD, non toxic appearing  HEENT: MMM  Neck: supple  Respiratory: CTA b/l, no wheezing or rhonchi  Cardiovascular: s1s2 RRR  Abdomen: soft, non distended, +BS  Extremities: warm, no edema or clubbing  Skin: intact  Neurological: does not open eyes to name, does not respond to painful stimuli    LINES:  none    HOSPITAL MEDICATIONS:  MEDICATIONS  (STANDING):  atorvastatin 80 milliGRAM(s) Oral at bedtime  carvedilol 6.25 milliGRAM(s) Oral every 12 hours  dextrose 5%. 1000 milliLiter(s) (50 mL/Hr) IV Continuous <Continuous>  dextrose 50% Injectable 12.5 Gram(s) IV Push once  insulin lispro (HumaLOG) corrective regimen sliding scale   SubCutaneous every 6 hours  lacosamide IVPB 50 milliGRAM(s) IV Intermittent every 12 hours  levETIRAcetam  IVPB 750 milliGRAM(s) IV Intermittent every 12 hours  levothyroxine Injectable 25 MICROGram(s) IV Push at bedtime  niCARdipine Infusion 5 mG/Hr (25 mL/Hr) IV Continuous <Continuous>  niMODipine 60 milliGRAM(s) Oral every 4 hours  pantoprazole  Injectable 40 milliGRAM(s) IV Push daily  sodium chloride 3%. 500 milliLiter(s) (30 mL/Hr) IV Continuous <Continuous>    MEDICATIONS  (PRN):  acetaminophen    Suspension .. 650 milliGRAM(s) Oral every 6 hours PRN Temp greater or equal to 38C (100.4F), Moderate Pain (4 - 6)  dextrose 40% Gel 15 Gram(s) Oral once PRN Blood Glucose LESS THAN 70 milliGRAM(s)/deciliter  glucagon  Injectable 1 milliGRAM(s) IntraMuscular once PRN Glucose LESS THAN 70 milligrams/deciliter      LABS:                        9.4    17.08 )-----------( 228      ( 15 Dec 2019 05:50 )             29.4     Hgb Trend: 9.4<--, 9.7<--, 10.8<--, 11.7<--  12-15    142  |  109<H>  |  50<H>  ----------------------------<  274<H>  4.4   |  26  |  5.70<H>    Ca    8.0<L>      15 Dec 2019 15:59  Phos  2.2     12-15  Mg     2.0     -15    TPro  7.0  /  Alb  2.5<L>  /  TBili  0.3  /  DBili  x   /  AST  45<H>  /  ALT  15  /  AlkPhos  105  12-15    PT/INR - ( 14 Dec 2019 22:30 )   PT: 13.0 sec;   INR: 1.16 ratio         PTT - ( 13 Dec 2019 23:13 )  PTT:31.5 sec  Urinalysis Basic - ( 14 Dec 2019 01:09 )    Color: Yellow / Appearance: Clear / S.005 / pH: x  Gluc: x / Ketone: Negative  / Bili: Negative / Urobili: Negative mg/dL   Blood: x / Protein: 500 mg/dL / Nitrite: Negative   Leuk Esterase: Negative / RBC: 3-5 /HPF / WBC 0-2   Sq Epi: x / Non Sq Epi: Occasional / Bacteria: Few      Arterial Blood Gas:   @ 23:40  --/38/160/26/99/2.8  ABG lactate: --        MICROBIOLOGY:     RADIOLOGY:  [ ] Reviewed and interpreted by me    < from: TTE Echo Doppler w/o Cont (12.15.19 @ 08:32) >  Summary:   1. Left ventricular ejection fraction, by visual estimation, is 60 to   65%.   2. Normal global left ventricular systolic function.   3. Mild mitralannular calcification.   4. Trace mitral valve regurgitation.      < end of copied text >    < from: CT Head No Cont (12.15.19 @ 03:13) >  IMPRESSION:   Extensive multifocal irregular areas of cytotoxic edema in the periphery   of the superior and posterior brain bilaterally, most compatible in   appearance with embolic infarctions; however, infectious  cerebritis/encephalitis could have the same appearance. Resolution of   subarachnoid hemorrhage. MR is recommended for further evaluation with IV     < end of copied text >

## 2019-12-16 LAB
ALBUMIN SERPL ELPH-MCNC: 2.4 G/DL — LOW (ref 3.3–5)
ALP SERPL-CCNC: 91 U/L — SIGNIFICANT CHANGE UP (ref 40–120)
ALT FLD-CCNC: 14 U/L — SIGNIFICANT CHANGE UP (ref 12–78)
ANION GAP SERPL CALC-SCNC: 8 MMOL/L — SIGNIFICANT CHANGE UP (ref 5–17)
AST SERPL-CCNC: 38 U/L — HIGH (ref 15–37)
BASE EXCESS BLDA CALC-SCNC: -0.9 MMOL/L — SIGNIFICANT CHANGE UP (ref -2–2)
BASOPHILS # BLD AUTO: 0.03 K/UL — SIGNIFICANT CHANGE UP (ref 0–0.2)
BASOPHILS NFR BLD AUTO: 0.2 % — SIGNIFICANT CHANGE UP (ref 0–2)
BILIRUB SERPL-MCNC: 0.3 MG/DL — SIGNIFICANT CHANGE UP (ref 0.2–1.2)
BLOOD GAS COMMENTS: SIGNIFICANT CHANGE UP
BLOOD GAS COMMENTS: SIGNIFICANT CHANGE UP
BLOOD GAS SOURCE: SIGNIFICANT CHANGE UP
BUN SERPL-MCNC: 67 MG/DL — HIGH (ref 7–23)
CALCIUM SERPL-MCNC: 8 MG/DL — LOW (ref 8.5–10.1)
CHLORIDE SERPL-SCNC: 112 MMOL/L — HIGH (ref 96–108)
CO2 SERPL-SCNC: 22 MMOL/L — SIGNIFICANT CHANGE UP (ref 22–31)
CREAT SERPL-MCNC: 6.73 MG/DL — HIGH (ref 0.5–1.3)
EOSINOPHIL # BLD AUTO: 0.19 K/UL — SIGNIFICANT CHANGE UP (ref 0–0.5)
EOSINOPHIL NFR BLD AUTO: 1.2 % — SIGNIFICANT CHANGE UP (ref 0–6)
GLUCOSE BLDC GLUCOMTR-MCNC: 169 MG/DL — HIGH (ref 70–99)
GLUCOSE BLDC GLUCOMTR-MCNC: 250 MG/DL — HIGH (ref 70–99)
GLUCOSE BLDC GLUCOMTR-MCNC: 286 MG/DL — HIGH (ref 70–99)
GLUCOSE BLDC GLUCOMTR-MCNC: 307 MG/DL — HIGH (ref 70–99)
GLUCOSE BLDC GLUCOMTR-MCNC: 323 MG/DL — HIGH (ref 70–99)
GLUCOSE SERPL-MCNC: 226 MG/DL — HIGH (ref 70–99)
HCO3 BLDA-SCNC: 22 MMOL/L — SIGNIFICANT CHANGE UP (ref 21–29)
HCT VFR BLD CALC: 27.2 % — LOW (ref 39–50)
HGB BLD-MCNC: 8.5 G/DL — LOW (ref 13–17)
HOROWITZ INDEX BLDA+IHG-RTO: 32 — SIGNIFICANT CHANGE UP
IMM GRANULOCYTES NFR BLD AUTO: 0.7 % — SIGNIFICANT CHANGE UP (ref 0–1.5)
LYMPHOCYTES # BLD AUTO: 13.4 % — SIGNIFICANT CHANGE UP (ref 13–44)
LYMPHOCYTES # BLD AUTO: 2.09 K/UL — SIGNIFICANT CHANGE UP (ref 1–3.3)
MAGNESIUM SERPL-MCNC: 2.7 MG/DL — HIGH (ref 1.6–2.6)
MCHC RBC-ENTMCNC: 28.9 PG — SIGNIFICANT CHANGE UP (ref 27–34)
MCHC RBC-ENTMCNC: 31.3 GM/DL — LOW (ref 32–36)
MCV RBC AUTO: 92.5 FL — SIGNIFICANT CHANGE UP (ref 80–100)
MONOCYTES # BLD AUTO: 1.51 K/UL — HIGH (ref 0–0.9)
MONOCYTES NFR BLD AUTO: 9.7 % — SIGNIFICANT CHANGE UP (ref 2–14)
NEUTROPHILS # BLD AUTO: 11.65 K/UL — HIGH (ref 1.8–7.4)
NEUTROPHILS NFR BLD AUTO: 74.8 % — SIGNIFICANT CHANGE UP (ref 43–77)
NRBC # BLD: 0 /100 WBCS — SIGNIFICANT CHANGE UP (ref 0–0)
PCO2 BLDA: 33 MMHG — SIGNIFICANT CHANGE UP (ref 32–46)
PH BLD: 7.44 — SIGNIFICANT CHANGE UP (ref 7.35–7.45)
PHOSPHATE SERPL-MCNC: 2.5 MG/DL — SIGNIFICANT CHANGE UP (ref 2.5–4.5)
PLATELET # BLD AUTO: 227 K/UL — SIGNIFICANT CHANGE UP (ref 150–400)
PO2 BLDA: 113 MMHG — HIGH (ref 74–108)
POTASSIUM SERPL-MCNC: 4.7 MMOL/L — SIGNIFICANT CHANGE UP (ref 3.5–5.3)
POTASSIUM SERPL-SCNC: 4.7 MMOL/L — SIGNIFICANT CHANGE UP (ref 3.5–5.3)
PROCALCITONIN SERPL-MCNC: 2.15 NG/ML — HIGH (ref 0.02–0.1)
PROT SERPL-MCNC: 6.9 GM/DL — SIGNIFICANT CHANGE UP (ref 6–8.3)
RBC # BLD: 2.94 M/UL — LOW (ref 4.2–5.8)
RBC # FLD: 20 % — HIGH (ref 10.3–14.5)
SAO2 % BLDA: 98 % — HIGH (ref 92–96)
SODIUM SERPL-SCNC: 142 MMOL/L — SIGNIFICANT CHANGE UP (ref 135–145)
WBC # BLD: 15.58 K/UL — HIGH (ref 3.8–10.5)
WBC # FLD AUTO: 15.58 K/UL — HIGH (ref 3.8–10.5)

## 2019-12-16 PROCEDURE — 31500 INSERT EMERGENCY AIRWAY: CPT

## 2019-12-16 PROCEDURE — 99291 CRITICAL CARE FIRST HOUR: CPT | Mod: 25

## 2019-12-16 RX ORDER — DEXMEDETOMIDINE HYDROCHLORIDE IN 0.9% SODIUM CHLORIDE 4 UG/ML
0.2 INJECTION INTRAVENOUS
Qty: 200 | Refills: 0 | Status: DISCONTINUED | OUTPATIENT
Start: 2019-12-16 | End: 2019-12-18

## 2019-12-16 RX ORDER — HEPARIN SODIUM 5000 [USP'U]/ML
5000 INJECTION INTRAVENOUS; SUBCUTANEOUS EVERY 12 HOURS
Refills: 0 | Status: DISCONTINUED | OUTPATIENT
Start: 2019-12-16 | End: 2019-12-19

## 2019-12-16 RX ORDER — CLOPIDOGREL BISULFATE 75 MG/1
75 TABLET, FILM COATED ORAL DAILY
Refills: 0 | Status: DISCONTINUED | OUTPATIENT
Start: 2019-12-16 | End: 2019-12-19

## 2019-12-16 RX ORDER — FENTANYL CITRATE 50 UG/ML
50 INJECTION INTRAVENOUS ONCE
Refills: 0 | Status: DISCONTINUED | OUTPATIENT
Start: 2019-12-16 | End: 2019-12-16

## 2019-12-16 RX ORDER — PROPOFOL 10 MG/ML
50 INJECTION, EMULSION INTRAVENOUS ONCE
Refills: 0 | Status: COMPLETED | OUTPATIENT
Start: 2019-12-16 | End: 2019-12-16

## 2019-12-16 RX ORDER — ASPIRIN/CALCIUM CARB/MAGNESIUM 324 MG
81 TABLET ORAL DAILY
Refills: 0 | Status: DISCONTINUED | OUTPATIENT
Start: 2019-12-16 | End: 2019-12-19

## 2019-12-16 RX ORDER — CHLORHEXIDINE GLUCONATE 213 G/1000ML
15 SOLUTION TOPICAL EVERY 12 HOURS
Refills: 0 | Status: DISCONTINUED | OUTPATIENT
Start: 2019-12-16 | End: 2019-12-19

## 2019-12-16 RX ORDER — CARVEDILOL PHOSPHATE 80 MG/1
3.12 CAPSULE, EXTENDED RELEASE ORAL EVERY 12 HOURS
Refills: 0 | Status: DISCONTINUED | OUTPATIENT
Start: 2019-12-16 | End: 2019-12-18

## 2019-12-16 RX ADMIN — Medication 6: at 06:18

## 2019-12-16 RX ADMIN — Medication 8: at 23:57

## 2019-12-16 RX ADMIN — SODIUM CHLORIDE 30 MILLILITER(S): 5 INJECTION, SOLUTION INTRAVENOUS at 00:19

## 2019-12-16 RX ADMIN — Medication 81 MILLIGRAM(S): at 15:07

## 2019-12-16 RX ADMIN — Medication 2: at 17:19

## 2019-12-16 RX ADMIN — FENTANYL CITRATE 50 MICROGRAM(S): 50 INJECTION INTRAVENOUS at 23:00

## 2019-12-16 RX ADMIN — FENTANYL CITRATE 50 MICROGRAM(S): 50 INJECTION INTRAVENOUS at 23:05

## 2019-12-16 RX ADMIN — CLOPIDOGREL BISULFATE 75 MILLIGRAM(S): 75 TABLET, FILM COATED ORAL at 15:07

## 2019-12-16 RX ADMIN — PANTOPRAZOLE SODIUM 40 MILLIGRAM(S): 20 TABLET, DELAYED RELEASE ORAL at 11:45

## 2019-12-16 RX ADMIN — ATORVASTATIN CALCIUM 80 MILLIGRAM(S): 80 TABLET, FILM COATED ORAL at 21:33

## 2019-12-16 RX ADMIN — PROPOFOL 50 MILLIGRAM(S): 10 INJECTION, EMULSION INTRAVENOUS at 23:00

## 2019-12-16 RX ADMIN — Medication 25 MICROGRAM(S): at 21:32

## 2019-12-16 RX ADMIN — HEPARIN SODIUM 5000 UNIT(S): 5000 INJECTION INTRAVENOUS; SUBCUTANEOUS at 17:18

## 2019-12-16 RX ADMIN — LEVETIRACETAM 400 MILLIGRAM(S): 250 TABLET, FILM COATED ORAL at 17:13

## 2019-12-16 RX ADMIN — Medication 8: at 11:45

## 2019-12-16 RX ADMIN — LACOSAMIDE 110 MILLIGRAM(S): 50 TABLET ORAL at 17:43

## 2019-12-16 RX ADMIN — Medication 650 MILLIGRAM(S): at 23:47

## 2019-12-16 RX ADMIN — LEVETIRACETAM 400 MILLIGRAM(S): 250 TABLET, FILM COATED ORAL at 05:51

## 2019-12-16 RX ADMIN — Medication 4: at 00:18

## 2019-12-16 RX ADMIN — CARVEDILOL PHOSPHATE 3.12 MILLIGRAM(S): 80 CAPSULE, EXTENDED RELEASE ORAL at 15:54

## 2019-12-16 RX ADMIN — NIMODIPINE 60 MILLIGRAM(S): 60 SOLUTION ORAL at 10:37

## 2019-12-16 RX ADMIN — LACOSAMIDE 110 MILLIGRAM(S): 50 TABLET ORAL at 06:30

## 2019-12-16 NOTE — CHART NOTE - NSCHARTNOTEFT_GEN_A_CORE
Attending note    Patient noted to have progressive worsening response to pain, now unresponsive to painful stimuli.  Continues to have copious amounts of secretion requiring q1h suctioning.  Concern for persistent aspiration and possible worsening neuro exam.  Discussed with son Babatunde Castelan regarding intubation for airway protection and improved tracheal suctioning, who was in agreement.  Patient was intubated at 2315 with Propofol 50mg and Fentanyl 100mcg with glidescope secured at 24cm with a 7.5ETT.  Copious amounts of secretions noted from ETT upon placement.  Pt connected to ventilator with no desaturations.      Updated son who was at bedside.  Will obtain head ct to further evaluate.  Continue with neuro check q1H.     Jacob Colon  Attending Physician  Emergency Medicine / Critical Care Medicine  [phone]

## 2019-12-16 NOTE — OCCUPATIONAL THERAPY INITIAL EVALUATION ADULT - RANGE OF MOTION EXAMINATION, UPPER EXTREMITY
bilateral UE Passive ROM was WFL  (within functional limits)/Patient unable to perform AROM tests due to cognition and arousal level

## 2019-12-16 NOTE — PROGRESS NOTE ADULT - ASSESSMENT
Subjective Complaints:  Historian:             Vital Signs Last 24 Hrs  T(C): 37.3 (16 Dec 2019 17:20), Max: 37.7 (16 Dec 2019 12:30)  T(F): 99.2 (16 Dec 2019 17:20), Max: 99.8 (16 Dec 2019 12:30)  HR: 88 (16 Dec 2019 22:30) (68 - 116)  BP: 159/61 (16 Dec 2019 22:30) (117/39 - 188/70)  BP(mean): 86 (16 Dec 2019 22:30) (57 - 147)  RR: 27 (16 Dec 2019 22:30) (0 - 113)  SpO2: 100% (16 Dec 2019 22:30) (90% - 100%)    GENERAL PHYSICAL EXAM:  General:  Appears stated age, well-groomed, well-nourished, no distress  HEENT:  NC/AT, patent nares w/ pink mucosa, OP clear w/o lesions, PERRL, EOMI, conjunctivae clear, no thyromegaly, nodules, adenopathy, no JVD  Chest:  Full & symmetric excursion, no increased effort, breath sounds clear  Cardiovascular:  Regular rhythm, S1, S2, no murmur/rub/S3/S4, no carotid/femoral/abdominal bruit, radial/pedal pulses 2+, no edema  Abdomen:  Soft, non-tender, non-distended, normoactive bowel sounds, no HSM  Extremities:  Gait & station:   Digits:   Nails:   Joints, Bones, Muscles:   ROM:   Stability:  Skin:  No rash/erythema/ecchymoses/petechiae/wounds/abscess/warm/dry  Musculoskeletal:  Full ROM in all joints w/o swelling/tenderness/effusion        LABS:                        8.5    15.58 )-----------( 227      ( 16 Dec 2019 04:27 )             27.2     12-16    142  |  112<H>  |  67<H>  ----------------------------<  226<H>  4.7   |  22  |  6.73<H>    Ca    8.0<L>      16 Dec 2019 04:27  Phos  2.5     12-16  Mg     2.7     12-16    TPro  6.9  /  Alb  2.4<L>  /  TBili  0.3  /  DBili  x   /  AST  38<H>  /  ALT  14  /  AlkPhos  91  12-16          RADIOLOGY & ADDITIONAL STUDIES:        Neurology Progress Note:      Mental Status: unresponsive  pupils reacting to light  neck supple no seziure reprted        Cranial Nerves: pupils reacts to light       Motor:   arm leg weakness quadroparesis         Sensory:respinds to pain       Cerebellar: defrd      Gait:defrd      Assesment/Plan:  s/p tpa dave cva mulyiple infaction  embolic on asa and plavix no anti coagulation for 2/3 wks  on keppra and vimpat  will follow  quadroparesis

## 2019-12-16 NOTE — DIETITIAN INITIAL EVALUATION ADULT. - PERTINENT LABORATORY DATA
12-16 Na 142 mmol/L Glu 226 mg/dL<H> K+ 4.7 mmol/L Cr 6.73 mg/dL<H> BUN 67 mg/dL<H> Phos 2.5 mg/dL Alb 2.4 g/dL<L> PAB n/a   Hgb 8.5 g/dL<L> Hct 27.2 %<L> HgA1C 8.4%(12/15)    Glucose, Serum: 226 mg/dL <H>, 12-15 Chol 134 LDL 52 HDL 56 Trig 129, WBC=15.58(12/16), GFR=8  Glucose, Serum: 274 mg/dL <H>   24Hr FS:286 mg/dL  250 mg/dL  258 mg/dL  320 mg/dL

## 2019-12-16 NOTE — PROCEDURE NOTE - NSTRACHPOSTINTU_RESP_A_CORE
Breath sounds equal/Chest X-Ray/Appropriate capnography/Chest excursion noted/Positive end tidal Co2 noted/Breath sounds bilateral

## 2019-12-16 NOTE — OCCUPATIONAL THERAPY INITIAL EVALUATION ADULT - IMPAIRMENTS CONTRIBUTING IMPAIRED BED MOBILITY, REHAB EVAL
cognition/decreased sensation/decreased flexibility/abnormal muscle tone/impaired motor control/impaired postural control/impaired sensory feedback/decreased strength/impaired coordination

## 2019-12-16 NOTE — PROGRESS NOTE ADULT - SUBJECTIVE AND OBJECTIVE BOX
[facility] [place] [facility]   MD Yokasta Gary D.O. Fatima Sheikh, D.O. Ruoru Wong, PA    From 7 AM - 5 PM:  OFFICE: [phone]  Dr. Barrientos cell: [phone]  Dr. Lopes cell: [phone]  Dr. Witt cell: [phone]  WALDO Valentin cell: [phone]    From 5 PM - 7 AM: Answering Service: [phone]      RENAL FOLLOW UP NOTE  --------------------------------------------------------------------------------  HPI: Pt seen and examined at bedside. on high flow oxygen. Non-responsive     PAST HISTORY  --------------------------------------------------------------------------------  No significant changes to PMH, PSH, FHx, SHx, unless otherwise noted    ALLERGIES & MEDICATIONS  --------------------------------------------------------------------------------  Allergies    No Known Allergies    Intolerances      Standing Inpatient Medications  atorvastatin 80 milliGRAM(s) Oral at bedtime  dextrose 5%. 1000 milliLiter(s) IV Continuous <Continuous>  dextrose 50% Injectable 12.5 Gram(s) IV Push once  insulin lispro (HumaLOG) corrective regimen sliding scale   SubCutaneous every 6 hours  lacosamide IVPB 50 milliGRAM(s) IV Intermittent every 12 hours  levETIRAcetam  IVPB 750 milliGRAM(s) IV Intermittent every 12 hours  levothyroxine Injectable 25 MICROGram(s) IV Push at bedtime  niMODipine 60 milliGRAM(s) Oral every 4 hours  pantoprazole  Injectable 40 milliGRAM(s) IV Push daily  sodium chloride 3%. 500 milliLiter(s) IV Continuous <Continuous>    PRN Inpatient Medications  acetaminophen    Suspension .. 650 milliGRAM(s) Oral every 6 hours PRN  dextrose 40% Gel 15 Gram(s) Oral once PRN  glucagon  Injectable 1 milliGRAM(s) IntraMuscular once PRN      REVIEW OF SYSTEMS  --------------------------------------------------------------------------------  Unable to obtain     VITALS/PHYSICAL EXAM  --------------------------------------------------------------------------------  T(C): 37.4 (12-16-19 @ 07:20), Max: 38.3 (12-15-19 @ 12:17)  HR: 94 (12-16-19 @ 09:01) (68 - 98)  BP: 172/65 (12-16-19 @ 08:00) (112/38 - 172/65)  RR: 29 (12-16-19 @ 09:01) (11 - 42)  SpO2: 100% (12-16-19 @ 09:01) (89% - 100%)  Wt(kg): --    12-15-19 @ 07:01  -  12-16-19 @ 07:00  --------------------------------------------------------  IN: 1890 mL / OUT: 20 mL / NET: 1870 mL      Physical Exam:  	Gen: NAD on high flow  	HEENT: MMM  	Pulm: CTA B/L  	CV: S1S2  	Abd: Soft, +BS  	Ext: No LE edema B/L                      Neuro: non-responsive   	Skin: Warm and Dry   	Vascular access: + tunneled HD catheter     LABS/STUDIES  --------------------------------------------------------------------------------              8.5    15.58 >-----------<  227      [12-16-19 @ 04:27]              27.2     142  |  112  |  67  ----------------------------<  226      [12-16-19 @ 04:27]  4.7   |  22  |  6.73        Ca     8.0     [12-16-19 @ 04:27]      Mg     2.7     [12-16-19 @ 04:27]      Phos  2.5     [12-16-19 @ 04:27]    TPro  6.9  /  Alb  2.4  /  TBili  0.3  /  DBili  x   /  AST  38  /  ALT  14  /  AlkPhos  91  [12-16-19 @ 04:27]    PT/INR: PT 13.0 , INR 1.16       [12-14-19 @ 22:30]    Troponin 4.400      [12-15-19 @ 07:06]        [12-15-19 @ 07:06]    Creatinine Trend:  SCr 6.73 [12-16 @ 04:27]  SCr 5.70 [12-15 @ 15:59]  SCr 4.39 [12-15 @ 05:50]  SCr 3.30 [12-14 @ 22:30]  SCr 5.13 [12-14 @ 12:27]    Urinalysis - [12-14-19 @ 01:09]      Color Yellow / Appearance Clear / SG 1.005 / pH 7.0      Gluc 50 / Ketone Negative  / Bili Negative / Urobili Negative       Blood Trace / Protein 500 / Leuk Est Negative / Nitrite Negative      RBC 3-5 / WBC 0-2 / Hyaline  / Gran  / Sq Epi  / Non Sq Epi Occasional / Bacteria Few      Iron 26, TIBC 205, %sat 13      [06-04-19 @ 09:27]  Ferritin 99      [06-04-19 @ 09:33]  PTH -- (Ca 6.9)      [06-05-19 @ 08:14]   562  HbA1c 8.4      [12-15-19 @ 11:00]  TSH 1.160      [12-14-19 @ 06:30]  Lipid: chol 134, , HDL 56, LDL 52      [12-15-19 @ 10:56]    HBsAb >1000.0      [12-14-19 @ 23:48]  HBsAg Nonreact      [12-14-19 @ 23:48]  HCV 0.15, Nonreact      [12-14-19 @ 23:48]

## 2019-12-16 NOTE — OCCUPATIONAL THERAPY INITIAL EVALUATION ADULT - ORIENTATION, REHAB EVAL
Patient is functioning at a level 1 -Arousal on the [facility] Cognitive Continuum./unable to assess

## 2019-12-16 NOTE — OCCUPATIONAL THERAPY INITIAL EVALUATION ADULT - PRECAUTIONS/LIMITATIONS, REHAB EVAL
aspiration precautions/Nasal cannula high flow/cardiac precautions/vision precautions/fall precautions

## 2019-12-16 NOTE — CHART NOTE - NSCHARTNOTEFT_GEN_A_CORE
Called to bedside to evaluate breathing pattern.     Upon assessment pt with intermittent periods of apnea with brief periods of desaturation (low 80s, self resolved). Cheynes bianchi vs NILO? No new adventitious breath sounds appreciated, pt without change in mental status (has been minimally responsive, retracts to pain only per discussion with RN and review of chart, not new). Stat ABG 7.44/33/113/22. Pt uses nocturnal cpap as outpatient but was held this evening due to mental status/concern for secretion clearance. Discussed with Dr. Beasley in the eICU, will trial HFNC.

## 2019-12-16 NOTE — OCCUPATIONAL THERAPY INITIAL EVALUATION ADULT - NS ASR FOLLOW COMMAND OT EVAL
unable to follow commands/unable to answer questions/unable to make needs and wants known./speech unintelligible

## 2019-12-16 NOTE — PROGRESS NOTE ADULT - ASSESSMENT
65 yo male with ESRD on HD (M-W-FRI without AVF as awaiting renal transplant expected within 3 months), CAD s/p stent 2009 and abnormal cath 9/2018 with 90% pRCA lesion (not intervened upon) with placement of drug eluting stent everolimus Synergy  3x 28 mm in June 2019, HTN, DM type 2, hypothyroidism and left carotid stenosis admitted for altered mental status.    ESRD on HD  Last HD Saturday due to hyperkalemia   Plan for HD today  Consent obtained in the chart.   Continue low blood flow treatment   Will need to do dialysis dosing for antiseizure meds. Pt will need additional Keppra immediately post HD today  On hypertonic saline as per MICU. Will use higher sodium bath to assist with permissive hypernatremia     Hyperkalemia  Improved with HD    Anemia  Hb low  Unable to use epogen due to acute/ multiple strokes  Will need to transfuse PRN Hb <7     HTN  As per MICU  Will avoid hypotension/ UF with HD to avoid shifting  Controlled at bedside  Goal SBP above 140

## 2019-12-16 NOTE — PROGRESS NOTE ADULT - SUBJECTIVE AND OBJECTIVE BOX
HPI:  Pt is a 63 yo M with h/o CAD s/p stent 2009 and abnormal cath 9/2018 with 90% pRCA lesion (not intervened upon) with placement of drug eluting stent everolimus Synergy  3x 28 mm in June 2019, HTN, DM. L carotid stenosis, hypothyroidism and CKD stage 5  (M-W-FRI  awaiting renal transplant expected within 3 months. Pt presented  to the ER for altered mental status. EMS contacted by wife when pt noted unresponsive, aphasic and not moving extremities; pt Rx'd with TPA. 12/14 pt had CT neck/head; read as SAH. Early am 12/15 pt with Sz and started on Keppra.12/15 pt had MR head: Numerous acute diffuse bilateral supratentorial and infratentorial infarcts. CT/MR head reviewed with radiology. Pt did not have SAH it was contrast from the studies    ## Labs:  CBC:                        8.5    15.58 )-----------( 227      ( 16 Dec 2019 04:27 )             27.2     Chem:  12-16    142  |  112<H>  |  67<H>  ----------------------------<  226<H>  4.7   |  22  |  6.73<H>    Ca    8.0<L>      16 Dec 2019 04:27  Phos  2.5     12-16  Mg     2.7     12-16    TPro  6.9  /  Alb  2.4<L>  /  TBili  0.3  /  DBili  x   /  AST  38<H>  /  ALT  14  /  AlkPhos  91  12-16    Coags:  PT/INR - ( 14 Dec 2019 22:30 )   PT: 13.0 sec;   INR: 1.16 ratio           ## Imaging:    ## Medications:    carvedilol 3.125 milliGRAM(s) Oral every 12 hours      atorvastatin 80 milliGRAM(s) Oral at bedtime  dextrose 40% Gel 15 Gram(s) Oral once PRN  dextrose 50% Injectable 12.5 Gram(s) IV Push once  glucagon  Injectable 1 milliGRAM(s) IntraMuscular once PRN  insulin lispro (HumaLOG) corrective regimen sliding scale   SubCutaneous every 6 hours  levothyroxine Injectable 25 MICROGram(s) IV Push at bedtime    aspirin  chewable 81 milliGRAM(s) Oral daily  clopidogrel Tablet 75 milliGRAM(s) Oral daily  heparin  Injectable 5000 Unit(s) SubCutaneous every 12 hours    pantoprazole  Injectable 40 milliGRAM(s) IV Push daily    acetaminophen    Suspension .. 650 milliGRAM(s) Oral every 6 hours PRN  lacosamide IVPB 50 milliGRAM(s) IV Intermittent every 12 hours  levETIRAcetam  IVPB 750 milliGRAM(s) IV Intermittent every 12 hours      ## Vitals:  T(C): 36.8 (12-16-19 @ 15:50), Max: 38.1 (12-15-19 @ 18:30)  HR: 88 (12-16-19 @ 15:00) (68 - 98)  BP: 174/77 (12-16-19 @ 15:00) (117/39 - 174/77)  BP(mean): 99 (12-16-19 @ 15:00) (57 - 99)  RR: 113 (12-16-19 @ 16:54) (0 - 113)  SpO2: 98% (12-16-19 @ 16:54) (89% - 100%)  Wt(kg): --  Vent:   ABG: ABG - ( 16 Dec 2019 05:36 )  pH, Arterial: x     pH, Blood: 7.44  /  pCO2: 33    /  pO2: 113   / HCO3: 22    / Base Excess: -0.9  /  SaO2: 98            12-15 @ 07:01  -  12-16 @ 07:00  --------------------------------------------------------  IN: 1890 mL / OUT: 20 mL / NET: 1870 mL    12-16 @ 07:01  -  12-16 @ 16:57  --------------------------------------------------------  IN: 635 mL / OUT: 0 mL / NET: 635 mL      ## P/E:  Gen: lying comfortably in bed in no apparent distress  Lungs: Rhonchi  Heart: RRR  Abd: Soft/+BS  Ext: No edema  Neuro: Pupils 3-4 mm sluggish/ Obtunded/ Minimally responsive to painful stimuli    CENTRAL LINE: [ ] YES [ ] NO  LOCATION:   DATE INSERTED:  REMOVE: [ ] YES [ ] NO      PURDY: [ ] YES [ ] NO    DATE INSERTED:  REMOVE:  [ ] YES [ ] NO      A-LINE:  [ ] YES [ ] NO  LOCATION:   DATE INSERTED:  REMOVE:  [ ] YES [ ] NO  EXPLAIN:    CODE STATUS: [x] full code  [ ] DNR  [ ] DNI  [ ] MOLST  Goals of care discussion: [ ] yes

## 2019-12-16 NOTE — OCCUPATIONAL THERAPY INITIAL EVALUATION ADULT - PERTINENT HX OF CURRENT PROBLEM, REHAB EVAL
65 yo male with ESRD on HD (M-W-FRI without AVF as awaiting renal transplant expected within 3 months), CAD s/p stent 2009 and abnormal cath 9/2018 with 90% pRCA lesion (not intervened upon) with placement of drug eluting stent everolimus Synergy  3x 28 mm in June 2019, HTN, DM type 2, hypothyroidism and left carotid stenosis admitted for altered mental status.

## 2019-12-16 NOTE — OCCUPATIONAL THERAPY INITIAL EVALUATION ADULT - ADDITIONAL COMMENTS
As per EMR, patients house has 6 steps with bilateral rails, far apart and unable to be reached simultaneously. At the entry of the house there is no steps to negotiate at home. Information was obtained from GUIDO Figueroa.

## 2019-12-16 NOTE — DIETITIAN INITIAL EVALUATION ADULT. - OTHER INFO
Pt with AMS, no comprehension & appears lethargic this am. Spoke to pt's 2 sons. Pt lives with son's & wife; pt's wife does cooking & food shopping PTA. Pt manages DM type 2 with Levemir 8units HS & Novolog 3untis ac meals. Pt followed Renal/diabetic/Novosource 1-2 x day diet PTA. Pt with ESRD started dialysis 6 months ago; last HD 12/14. Pt with s/p CVA & TPA started NGT feeding 12/15. Last BM ? Residuals=0-10ml.    diet hx; pt likes [place] food;  chicken, fish (no red Beef).    UBW obtained from pt's son.

## 2019-12-16 NOTE — DIETITIAN INITIAL EVALUATION ADULT. - ENTERAL
Start NGT feeding Nepro with carb steady @ 30ml/hr to goal rate 45ml/bp=8626wv, 1944kcal & 87gm protein

## 2019-12-16 NOTE — OCCUPATIONAL THERAPY INITIAL EVALUATION ADULT - RANGE OF MOTION EXAMINATION, LOWER EXTREMITY
Patient unable to perform AROM tests due to cognition and arousal level/bilateral LE Passive ROM was WFL  (within functional limits)

## 2019-12-16 NOTE — DIETITIAN INITIAL EVALUATION ADULT. - ENERGY INTAKE
Pt receiving Glucerna 1.2 @ 55ml/hr; pt tolerating NGT feeding. TF started 12/15 @ 3pm. Receiving tube feeding and tolerating @ goal rate

## 2019-12-16 NOTE — PROGRESS NOTE ADULT - ASSESSMENT
Pt is a 65 yo M with h/o CAD s/p stent 2009 and abnormal cath 9/2018 with 90% pRCA lesion (not intervened upon) with placement of drug eluting stent everolimus Synergy  3x 28 mm in June 2019, HTN, DM. L carotid stenosis, hypothyroidism and CKD stage 5  (M-W-FRI  awaiting renal transplant expected within 3 months. Pt presented  to the ER for altered mental status. EMS contacted by wife when pt noted unresponsive, aphasic and not moving extremities; pt Rx'd with TPA. 12/14 pt had CT neck/head; read as SAH. Early am 12/15 pt with Sz and started on Keppra.12/15 pt had MR head: Numerous acute diffuse bilateral supratentorial and infratentorial infarcts. CT/MR head reviewed with radiology. Pt did not have SAH it was contrast from the studies. Poor MS 2 to multiple cerebral infarcts Pt is a 65 yo M with h/o CAD s/p stent 2009 and abnormal cath 9/2018 with 90% pRCA lesion (not intervened upon) with placement of drug eluting stent everolimus Synergy  3x 28 mm in June 2019, HTN, DM,  L carotid stenosis, hypothyroidism and CKD stage 5  (M-W-FRI  awaiting renal transplant expected within 3 months. Pt presented  to the ER for altered mental status. EMS contacted by wife when pt noted unresponsive, aphasic and not moving extremities; pt Rx'd with TPA. 12/14 pt had CT neck/head; read as SAH. Early am 12/15 pt with Sz and started on Keppra.12/15 pt had MR head: Numerous acute diffuse bilateral supratentorial and infratentorial infarcts. CT/MR head reviewed with radiology. Pt did not have SAH it was contrast from the studies. Poor MS 2 to multiple cerebral infarcts. During HD pt went into A fib which may be the possible cause of the strokes    Resp: Pt with poor MS low threshold to intubate for airway protection discussed with pt's son; for now cont HiFlo O2  CVS/Neuro: Both Nimodipine and 3%NaCl dc'd/ Resume pt's antiHTN meds; avoid extremes of BP  HEME: Restarted on Asa + Plavix/ Pt may require AC since transiently went into A fib  FEN: Carefully may cont enteral feeds/ Avoid hyponatremia  Endo: Goal Glu 120-180; cover with Lispro (pt hyperglycemic)  Renal: HD as per Renal  Neuro: Discussed with Radio MR/MRA with Gadolinium/ Neuro checks q 1hr/ F/u as per Neuro  Social: Pt's medical condition discussed with pt's son    CCT: 45 min

## 2019-12-16 NOTE — DIETITIAN INITIAL EVALUATION ADULT. - PERTINENT MEDS FT
acetaminophen    Suspension .. PRN  atorvastatin  dextrose 40% Gel PRN  dextrose 5%.  dextrose 50% Injectable  glucagon  Injectable PRN  insulin lispro (HumaLOG) corrective regimen sliding scale  lacosamide IVPB  levETIRAcetam  IVPB  levothyroxine Injectable  niMODipine  pantoprazole  Injectable  sodium chloride 3%.

## 2019-12-16 NOTE — OCCUPATIONAL THERAPY INITIAL EVALUATION ADULT - GENERAL OBSERVATIONS, REHAB EVAL
Pt was encountered supine in bed; NAD, peripheral IV+, cardiac monitor +, continuous pulse ox +, BP cuff +, pneumatic pumps, obtundent, eyes closes, not attentive, not coherent, no comprehension observed, no appropriate response noted with verbal, physical or tactile stimulation, speech garbled and unintelligible, unable to make wants and needs to known, unable to follow commands or directions.

## 2019-12-17 ENCOUNTER — TRANSCRIPTION ENCOUNTER (OUTPATIENT)
Age: 64
End: 2019-12-17

## 2019-12-17 LAB
ALBUMIN SERPL ELPH-MCNC: 2.3 G/DL — LOW (ref 3.3–5)
ALP SERPL-CCNC: 88 U/L — SIGNIFICANT CHANGE UP (ref 40–120)
ALT FLD-CCNC: 18 U/L — SIGNIFICANT CHANGE UP (ref 12–78)
ANION GAP SERPL CALC-SCNC: 7 MMOL/L — SIGNIFICANT CHANGE UP (ref 5–17)
AST SERPL-CCNC: 43 U/L — HIGH (ref 15–37)
BASE EXCESS BLDA CALC-SCNC: 3.5 MMOL/L — HIGH (ref -2–2)
BILIRUB SERPL-MCNC: 0.4 MG/DL — SIGNIFICANT CHANGE UP (ref 0.2–1.2)
BLOOD GAS COMMENTS: SIGNIFICANT CHANGE UP
BLOOD GAS SOURCE: SIGNIFICANT CHANGE UP
BUN SERPL-MCNC: 51 MG/DL — HIGH (ref 7–23)
CALCIUM SERPL-MCNC: 7.9 MG/DL — LOW (ref 8.5–10.1)
CHLORIDE SERPL-SCNC: 109 MMOL/L — HIGH (ref 96–108)
CO2 SERPL-SCNC: 27 MMOL/L — SIGNIFICANT CHANGE UP (ref 22–31)
CREAT SERPL-MCNC: 5.53 MG/DL — HIGH (ref 0.5–1.3)
GLUCOSE BLDC GLUCOMTR-MCNC: 176 MG/DL — HIGH (ref 70–99)
GLUCOSE BLDC GLUCOMTR-MCNC: 194 MG/DL — HIGH (ref 70–99)
GLUCOSE BLDC GLUCOMTR-MCNC: 204 MG/DL — HIGH (ref 70–99)
GLUCOSE BLDC GLUCOMTR-MCNC: 206 MG/DL — HIGH (ref 70–99)
GLUCOSE BLDC GLUCOMTR-MCNC: 210 MG/DL — HIGH (ref 70–99)
GLUCOSE BLDC GLUCOMTR-MCNC: 218 MG/DL — HIGH (ref 70–99)
GLUCOSE BLDC GLUCOMTR-MCNC: 221 MG/DL — HIGH (ref 70–99)
GLUCOSE BLDC GLUCOMTR-MCNC: 230 MG/DL — HIGH (ref 70–99)
GLUCOSE BLDC GLUCOMTR-MCNC: 258 MG/DL — HIGH (ref 70–99)
GLUCOSE BLDC GLUCOMTR-MCNC: 311 MG/DL — HIGH (ref 70–99)
GLUCOSE BLDC GLUCOMTR-MCNC: 334 MG/DL — HIGH (ref 70–99)
GLUCOSE BLDC GLUCOMTR-MCNC: 355 MG/DL — HIGH (ref 70–99)
GLUCOSE BLDC GLUCOMTR-MCNC: 392 MG/DL — HIGH (ref 70–99)
GLUCOSE BLDC GLUCOMTR-MCNC: 404 MG/DL — HIGH (ref 70–99)
GLUCOSE SERPL-MCNC: 293 MG/DL — HIGH (ref 70–99)
HCO3 BLDA-SCNC: 26 MMOL/L — SIGNIFICANT CHANGE UP (ref 21–29)
HCT VFR BLD CALC: 27.3 % — LOW (ref 39–50)
HGB BLD-MCNC: 8.6 G/DL — LOW (ref 13–17)
HOROWITZ INDEX BLDA+IHG-RTO: 0.5 — SIGNIFICANT CHANGE UP
MAGNESIUM SERPL-MCNC: 2.3 MG/DL — SIGNIFICANT CHANGE UP (ref 1.6–2.6)
MCHC RBC-ENTMCNC: 29.2 PG — SIGNIFICANT CHANGE UP (ref 27–34)
MCHC RBC-ENTMCNC: 31.5 GM/DL — LOW (ref 32–36)
MCV RBC AUTO: 92.5 FL — SIGNIFICANT CHANGE UP (ref 80–100)
NRBC # BLD: 0 /100 WBCS — SIGNIFICANT CHANGE UP (ref 0–0)
PCO2 BLDA: 33 MMHG — SIGNIFICANT CHANGE UP (ref 32–46)
PH BLD: 7.51 — HIGH (ref 7.35–7.45)
PHOSPHATE SERPL-MCNC: 2.2 MG/DL — LOW (ref 2.5–4.5)
PLATELET # BLD AUTO: 219 K/UL — SIGNIFICANT CHANGE UP (ref 150–400)
PO2 BLDA: 215 MMHG — HIGH (ref 74–108)
POTASSIUM SERPL-MCNC: 4 MMOL/L — SIGNIFICANT CHANGE UP (ref 3.5–5.3)
POTASSIUM SERPL-SCNC: 4 MMOL/L — SIGNIFICANT CHANGE UP (ref 3.5–5.3)
PROT SERPL-MCNC: 7 GM/DL — SIGNIFICANT CHANGE UP (ref 6–8.3)
RBC # BLD: 2.95 M/UL — LOW (ref 4.2–5.8)
RBC # FLD: 19.4 % — HIGH (ref 10.3–14.5)
SAO2 % BLDA: 99 % — HIGH (ref 92–96)
SODIUM SERPL-SCNC: 143 MMOL/L — SIGNIFICANT CHANGE UP (ref 135–145)
WBC # BLD: 11.5 K/UL — HIGH (ref 3.8–10.5)
WBC # FLD AUTO: 11.5 K/UL — HIGH (ref 3.8–10.5)

## 2019-12-17 PROCEDURE — 99291 CRITICAL CARE FIRST HOUR: CPT

## 2019-12-17 PROCEDURE — 70450 CT HEAD/BRAIN W/O DYE: CPT | Mod: 26

## 2019-12-17 PROCEDURE — 71045 X-RAY EXAM CHEST 1 VIEW: CPT | Mod: 26

## 2019-12-17 RX ORDER — PIPERACILLIN AND TAZOBACTAM 4; .5 G/20ML; G/20ML
3.38 INJECTION, POWDER, LYOPHILIZED, FOR SOLUTION INTRAVENOUS EVERY 12 HOURS
Refills: 0 | Status: DISCONTINUED | OUTPATIENT
Start: 2019-12-17 | End: 2019-12-19

## 2019-12-17 RX ORDER — CLOPIDOGREL BISULFATE 75 MG/1
1 TABLET, FILM COATED ORAL
Qty: 0 | Refills: 0 | DISCHARGE
Start: 2019-12-17

## 2019-12-17 RX ORDER — INSULIN DETEMIR 100/ML (3)
8 INSULIN PEN (ML) SUBCUTANEOUS
Qty: 0 | Refills: 0 | DISCHARGE

## 2019-12-17 RX ORDER — LACOSAMIDE 50 MG/1
5 TABLET ORAL
Qty: 0 | Refills: 0 | DISCHARGE
Start: 2019-12-17

## 2019-12-17 RX ORDER — PHENOBARBITAL 60 MG
100 TABLET ORAL EVERY 8 HOURS
Refills: 0 | Status: DISCONTINUED | OUTPATIENT
Start: 2019-12-17 | End: 2019-12-19

## 2019-12-17 RX ORDER — HEPARIN SODIUM 5000 [USP'U]/ML
5000 INJECTION INTRAVENOUS; SUBCUTANEOUS
Qty: 0 | Refills: 0 | DISCHARGE
Start: 2019-12-17

## 2019-12-17 RX ORDER — LEVETIRACETAM 250 MG/1
250 TABLET, FILM COATED ORAL EVERY 12 HOURS
Refills: 0 | Status: DISCONTINUED | OUTPATIENT
Start: 2019-12-17 | End: 2019-12-19

## 2019-12-17 RX ORDER — PHENOBARBITAL 60 MG
100 TABLET ORAL
Qty: 0 | Refills: 0 | DISCHARGE
Start: 2019-12-17

## 2019-12-17 RX ORDER — PANTOPRAZOLE SODIUM 20 MG/1
40 TABLET, DELAYED RELEASE ORAL
Qty: 0 | Refills: 0 | DISCHARGE
Start: 2019-12-17

## 2019-12-17 RX ORDER — INSULIN HUMAN 100 [IU]/ML
8 INJECTION, SOLUTION SUBCUTANEOUS
Qty: 100 | Refills: 0 | Status: DISCONTINUED | OUTPATIENT
Start: 2019-12-17 | End: 2019-12-19

## 2019-12-17 RX ORDER — AMIODARONE HYDROCHLORIDE 400 MG/1
0 TABLET ORAL
Qty: 0 | Refills: 0 | DISCHARGE
Start: 2019-12-17

## 2019-12-17 RX ORDER — HYDRALAZINE HCL 50 MG
1 TABLET ORAL
Qty: 0 | Refills: 0 | DISCHARGE

## 2019-12-17 RX ORDER — LEVETIRACETAM 250 MG/1
250 TABLET, FILM COATED ORAL
Qty: 0 | Refills: 0 | DISCHARGE
Start: 2019-12-17

## 2019-12-17 RX ORDER — ASPIRIN/CALCIUM CARB/MAGNESIUM 324 MG
1 TABLET ORAL
Qty: 0 | Refills: 0 | DISCHARGE

## 2019-12-17 RX ORDER — LACOSAMIDE 50 MG/1
100 TABLET ORAL EVERY 12 HOURS
Refills: 0 | Status: DISCONTINUED | OUTPATIENT
Start: 2019-12-17 | End: 2019-12-17

## 2019-12-17 RX ORDER — PHENOBARBITAL 60 MG
200 TABLET ORAL ONCE
Refills: 0 | Status: DISCONTINUED | OUTPATIENT
Start: 2019-12-17 | End: 2019-12-17

## 2019-12-17 RX ORDER — ACETAMINOPHEN 500 MG
20.31 TABLET ORAL
Qty: 0 | Refills: 0 | DISCHARGE
Start: 2019-12-17

## 2019-12-17 RX ORDER — ASPIRIN/CALCIUM CARB/MAGNESIUM 324 MG
1 TABLET ORAL
Qty: 0 | Refills: 0 | DISCHARGE
Start: 2019-12-17

## 2019-12-17 RX ORDER — INSULIN HUMAN 100 [IU]/ML
5 INJECTION, SOLUTION SUBCUTANEOUS
Qty: 100 | Refills: 0 | Status: DISCONTINUED | OUTPATIENT
Start: 2019-12-17 | End: 2019-12-17

## 2019-12-17 RX ORDER — AMIODARONE HYDROCHLORIDE 400 MG/1
1 TABLET ORAL
Qty: 900 | Refills: 0 | Status: DISCONTINUED | OUTPATIENT
Start: 2019-12-17 | End: 2019-12-18

## 2019-12-17 RX ORDER — INSULIN HUMAN 100 [IU]/ML
1 INJECTION, SOLUTION SUBCUTANEOUS
Qty: 0 | Refills: 0 | DISCHARGE
Start: 2019-12-17

## 2019-12-17 RX ORDER — INSULIN ASPART 100 [IU]/ML
3 INJECTION, SOLUTION SUBCUTANEOUS
Qty: 0 | Refills: 0 | DISCHARGE

## 2019-12-17 RX ORDER — AMIODARONE HYDROCHLORIDE 400 MG/1
0.5 TABLET ORAL
Qty: 900 | Refills: 0 | Status: DISCONTINUED | OUTPATIENT
Start: 2019-12-17 | End: 2019-12-18

## 2019-12-17 RX ORDER — LACOSAMIDE 50 MG/1
50 TABLET ORAL EVERY 12 HOURS
Refills: 0 | Status: DISCONTINUED | OUTPATIENT
Start: 2019-12-17 | End: 2019-12-19

## 2019-12-17 RX ORDER — OMEPRAZOLE 10 MG/1
1 CAPSULE, DELAYED RELEASE ORAL
Qty: 0 | Refills: 0 | DISCHARGE

## 2019-12-17 RX ORDER — CARVEDILOL PHOSPHATE 80 MG/1
1 CAPSULE, EXTENDED RELEASE ORAL
Qty: 0 | Refills: 0 | DISCHARGE
Start: 2019-12-17

## 2019-12-17 RX ORDER — DEXMEDETOMIDINE HYDROCHLORIDE IN 0.9% SODIUM CHLORIDE 4 UG/ML
0 INJECTION INTRAVENOUS
Qty: 0 | Refills: 0 | DISCHARGE
Start: 2019-12-17

## 2019-12-17 RX ORDER — AMIODARONE HYDROCHLORIDE 400 MG/1
150 TABLET ORAL ONCE
Refills: 0 | Status: COMPLETED | OUTPATIENT
Start: 2019-12-17 | End: 2019-12-17

## 2019-12-17 RX ORDER — ATORVASTATIN CALCIUM 80 MG/1
1 TABLET, FILM COATED ORAL
Qty: 0 | Refills: 0 | DISCHARGE
Start: 2019-12-17

## 2019-12-17 RX ORDER — PIPERACILLIN AND TAZOBACTAM 4; .5 G/20ML; G/20ML
3.38 INJECTION, POWDER, LYOPHILIZED, FOR SOLUTION INTRAVENOUS
Qty: 0 | Refills: 0 | DISCHARGE
Start: 2019-12-17

## 2019-12-17 RX ORDER — FERROUS SULFATE 325(65) MG
1 TABLET ORAL
Qty: 0 | Refills: 0 | DISCHARGE

## 2019-12-17 RX ADMIN — LEVETIRACETAM 400 MILLIGRAM(S): 250 TABLET, FILM COATED ORAL at 06:30

## 2019-12-17 RX ADMIN — HEPARIN SODIUM 5000 UNIT(S): 5000 INJECTION INTRAVENOUS; SUBCUTANEOUS at 17:22

## 2019-12-17 RX ADMIN — Medication 650 MILLIGRAM(S): at 22:07

## 2019-12-17 RX ADMIN — LACOSAMIDE 110 MILLIGRAM(S): 50 TABLET ORAL at 06:30

## 2019-12-17 RX ADMIN — LACOSAMIDE 110 MILLIGRAM(S): 50 TABLET ORAL at 18:05

## 2019-12-17 RX ADMIN — PANTOPRAZOLE SODIUM 40 MILLIGRAM(S): 20 TABLET, DELAYED RELEASE ORAL at 11:51

## 2019-12-17 RX ADMIN — Medication 650 MILLIGRAM(S): at 07:34

## 2019-12-17 RX ADMIN — Medication 100 MILLIGRAM(S): at 21:53

## 2019-12-17 RX ADMIN — CHLORHEXIDINE GLUCONATE 15 MILLILITER(S): 213 SOLUTION TOPICAL at 06:31

## 2019-12-17 RX ADMIN — Medication 10: at 11:51

## 2019-12-17 RX ADMIN — PIPERACILLIN AND TAZOBACTAM 25 GRAM(S): 4; .5 INJECTION, POWDER, LYOPHILIZED, FOR SOLUTION INTRAVENOUS at 17:21

## 2019-12-17 RX ADMIN — HEPARIN SODIUM 5000 UNIT(S): 5000 INJECTION INTRAVENOUS; SUBCUTANEOUS at 06:31

## 2019-12-17 RX ADMIN — INSULIN HUMAN 8 UNIT(S)/HR: 100 INJECTION, SOLUTION SUBCUTANEOUS at 13:59

## 2019-12-17 RX ADMIN — CHLORHEXIDINE GLUCONATE 15 MILLILITER(S): 213 SOLUTION TOPICAL at 17:21

## 2019-12-17 RX ADMIN — Medication 8: at 06:31

## 2019-12-17 RX ADMIN — Medication 25 MICROGRAM(S): at 21:53

## 2019-12-17 RX ADMIN — ATORVASTATIN CALCIUM 80 MILLIGRAM(S): 80 TABLET, FILM COATED ORAL at 21:53

## 2019-12-17 RX ADMIN — CARVEDILOL PHOSPHATE 3.12 MILLIGRAM(S): 80 CAPSULE, EXTENDED RELEASE ORAL at 06:31

## 2019-12-17 RX ADMIN — Medication 650 MILLIGRAM(S): at 13:18

## 2019-12-17 RX ADMIN — Medication 81 MILLIGRAM(S): at 11:51

## 2019-12-17 RX ADMIN — AMIODARONE HYDROCHLORIDE 618 MILLIGRAM(S): 400 TABLET ORAL at 13:04

## 2019-12-17 RX ADMIN — CLOPIDOGREL BISULFATE 75 MILLIGRAM(S): 75 TABLET, FILM COATED ORAL at 11:51

## 2019-12-17 RX ADMIN — Medication 650 MILLIGRAM(S): at 08:00

## 2019-12-17 RX ADMIN — Medication 650 MILLIGRAM(S): at 20:47

## 2019-12-17 RX ADMIN — Medication 200 MILLIGRAM(S): at 19:48

## 2019-12-17 RX ADMIN — LEVETIRACETAM 400 MILLIGRAM(S): 250 TABLET, FILM COATED ORAL at 21:53

## 2019-12-17 RX ADMIN — Medication 650 MILLIGRAM(S): at 13:41

## 2019-12-17 RX ADMIN — Medication 650 MILLIGRAM(S): at 02:30

## 2019-12-17 RX ADMIN — AMIODARONE HYDROCHLORIDE 33.33 MG/MIN: 400 TABLET ORAL at 13:18

## 2019-12-17 RX ADMIN — CARVEDILOL PHOSPHATE 3.12 MILLIGRAM(S): 80 CAPSULE, EXTENDED RELEASE ORAL at 17:21

## 2019-12-17 NOTE — PROGRESS NOTE ADULT - ASSESSMENT
63 yo male with ESRD on HD (M-W-FRI without AVF as awaiting renal transplant expected within 3 months), CAD s/p stent 2009 and abnormal cath 9/2018 with 90% pRCA lesion (not intervened upon) with placement of drug eluting stent everolimus Synergy  3x 28 mm in June 2019, HTN, DM type 2, hypothyroidism and left carotid stenosis admitted for altered mental status.    ESRD on HD  Last HD yesterday  No acute indication for additional HD today. Plan for IHD tomorrow   Continue low blood flow treatment   Will need to do dialysis dosing for antiseizure meds. Pt will need additional Keppra immediately post HD on HD days     Hyperkalemia  Improved with HD    Anemia  Hb low  Unable to use epogen due to acute/ multiple strokes  Will need to transfuse PRN Hb <7     HTN  As per MICU  Will avoid hypotension/ UF with HD to avoid shifting  Controlled at bedside  Goal SBP above 130

## 2019-12-17 NOTE — PROGRESS NOTE ADULT - SUBJECTIVE AND OBJECTIVE BOX
HPI:  Pt is a 63 yo M with h/o CAD s/p stent 2009 and abnormal cath 9/2018 with 90% pRCA lesion (not intervened upon) with placement of drug eluting stent everolimus Synergy  3x 28 mm in June 2019, HTN, DM,  L carotid stenosis, hypothyroidism and CKD stage 5  (M-W-FRI  awaiting renal transplant expected within 3 months. Pt presented  to the ER for altered mental status. EMS contacted by wife when pt noted unresponsive, aphasic and not moving extremities; pt Rx'd with TPA. 12/14 pt had CT neck/head; read as SAH. Early am 12/15 pt with Sz and started on Keppra.12/15 pt had MR head: Numerous acute diffuse bilateral supratentorial and infratentorial infarcts. CT/MR head reviewed with radiology. Pt did not have SAH it was contrast from the studies. Poor MS 2 to multiple cerebral infarcts. During HD pt went into A fib which may be the possible cause of the strokes. s/p intubation 12/16 for airway protection    ## Labs:  CBC:                        8.6    11.50 )-----------( 219      ( 17 Dec 2019 03:07 )             27.3     Chem:  12-17    143  |  109<H>  |  51<H>  ----------------------------<  293<H>  4.0   |  27  |  5.53<H>    Ca    7.9<L>      17 Dec 2019 03:07  Phos  2.2     12-17  Mg     2.3     12-17    TPro  7.0  /  Alb  2.3<L>  /  TBili  0.4  /  DBili  x   /  AST  43<H>  /  ALT  18  /  AlkPhos  88  12-17    Coags:      ## Imaging:    ## Medications:  piperacillin/tazobactam IVPB.. 3.375 Gram(s) IV Intermittent every 12 hours    aMIOdarone Infusion 1 mG/Min IV Continuous <Continuous>  aMIOdarone Infusion 0.5 mG/Min IV Continuous <Continuous>  carvedilol 3.125 milliGRAM(s) Oral every 12 hours      atorvastatin 80 milliGRAM(s) Oral at bedtime  dextrose 40% Gel 15 Gram(s) Oral once PRN  dextrose 50% Injectable 12.5 Gram(s) IV Push once  glucagon  Injectable 1 milliGRAM(s) IntraMuscular once PRN  insulin regular Infusion 8 Unit(s)/Hr IV Continuous <Continuous>  levothyroxine Injectable 25 MICROGram(s) IV Push at bedtime    aspirin  chewable 81 milliGRAM(s) Oral daily  clopidogrel Tablet 75 milliGRAM(s) Oral daily  heparin  Injectable 5000 Unit(s) SubCutaneous every 12 hours    pantoprazole  Injectable 40 milliGRAM(s) IV Push daily    acetaminophen    Suspension .. 650 milliGRAM(s) Oral every 6 hours PRN  dexMEDEtomidine Infusion 0.2 MICROgram(s)/kG/Hr IV Continuous <Continuous>  lacosamide IVPB 50 milliGRAM(s) IV Intermittent every 12 hours  levETIRAcetam  IVPB 250 milliGRAM(s) IV Intermittent every 12 hours  PHENobarbital Injectable 100 milliGRAM(s) IV Push every 8 hours      ## Vitals:  T(C): 38.2 (12-17-19 @ 15:47), Max: 38.5 (12-17-19 @ 07:22)  HR: 66 (12-17-19 @ 19:00) (64 - 99)  BP: 139/60 (12-17-19 @ 19:00) (110/46 - 180/72)  BP(mean): 78 (12-17-19 @ 19:00) (61 - 99)  RR: 22 (12-17-19 @ 19:00) (15 - 27)  SpO2: 100% (12-17-19 @ 19:00) (99% - 100%)  Wt(kg): --  Vent: Mode: AC/ CMV (Assist Control/ Continuous Mandatory Ventilation), RR (machine): 12, RR (patient): 21, TV (machine): 420, FiO2: 30, PEEP: 5, PIP: 15  ABG: ABG - ( 17 Dec 2019 03:03 )  pH, Arterial: x     pH, Blood: 7.51  /  pCO2: 33    /  pO2: 215   / HCO3: 26    / Base Excess: 3.5   /  SaO2: 99              12-16 @ 07:01  -  12-17 @ 07:00  --------------------------------------------------------  IN: 1485 mL / OUT: 1000 mL / NET: 485 mL    12-17 @ 07:01  - 12-17 @ 19:56  --------------------------------------------------------  IN: 1031.5 mL / OUT: 0 mL / NET: 1031.5 mL      ## P/E:  Gen: lying comfortably in bed in no apparent distress  Mouth: (+) ET  Lungs: CTA  Heart: RRR  Abd: Soft/+BS  Ext: No edema  Neuro: Pupils sluggish/ (+) Gag/ Responsive to painful stimuli    CENTRAL LINE: [ ] YES [ ] NO  LOCATION:   DATE INSERTED:  REMOVE: [ ] YES [ ] NO      GURWINDER: [ ] YES [ ] NO    DATE INSERTED:  REMOVE:  [ ] YES [ ] NO      A-LINE:  [ ] YES [ ] NO  LOCATION:   DATE INSERTED:  REMOVE:  [ ] YES [ ] NO  EXPLAIN:      CODE STATUS: [x] full code  [ ] DNR  [ ] DNI  [ ] [facility]  Goals of care discussion: [ ] yes

## 2019-12-17 NOTE — DISCHARGE NOTE PROVIDER - NSDCMRMEDTOKEN_GEN_ALL_CORE_FT
acetaminophen 160 mg/5 mL oral suspension: 20.31 milliliter(s) orally every 6 hours, As needed, Temp greater or equal to 38C (100.4F), Moderate Pain (4 - 6)  amiodarone:   aspirin 81 mg oral tablet, chewable: 1 tab(s) orally once a day  atorvastatin 80 mg oral tablet: 1 tab(s) orally once a day (at bedtime)  carvedilol 3.125 mg oral tablet: 1 tab(s) orally every 12 hours  clopidogrel 75 mg oral tablet: 1 tab(s) orally once a day  dexmedetomidine:   heparin: 5000 unit(s) subcutaneous every 12 hours  insulin regular 100 units/mL human recombinant injectable solution: 1 unit(s) injectable every 1 hours,   titrate drip based on protocol and hourly  fingersticks  lacosamide 200 mg/20 mL intravenous solution: 5 milliliter(s) intravenous every 12 hours  levETIRAcetam 1000 mg/100 mL-NaCl 0.75% intravenous solution: 250 milligram(s) by continuous intravenous infusion every 12 hours  levothyroxine 50 mcg (0.05 mg) oral tablet: 1 tab(s) orally once a day  pantoprazole 40 mg intravenous injection: 40 milligram(s) intravenous once a day  PHENobarbital: 100 milligram(s) intravenous every 8 hours  piperacillin-tazobactam: 3.375 gram(s) by continuous intravenous infusion every 12 hours

## 2019-12-17 NOTE — DISCHARGE NOTE PROVIDER - NSDCCPCAREPLAN_GEN_ALL_CORE_FT
PRINCIPAL DISCHARGE DIAGNOSIS  Diagnosis: Acute cerebrovascular accident (CVA)  Assessment and Plan of Treatment:       SECONDARY DISCHARGE DIAGNOSES  Diagnosis: Seizure  Assessment and Plan of Treatment:     Diagnosis: Ischemic stroke  Assessment and Plan of Treatment:     Diagnosis: Hypertensive emergency  Assessment and Plan of Treatment:

## 2019-12-17 NOTE — DISCHARGE NOTE PROVIDER - HOSPITAL COURSE
64 year old man with a history of ESRD on HD ( via right chest permacath), CAD s/p PCI (6/2019), HTN, DM, hypothyroidism, L carotid stenosis.  Pt presented  to the ER for altered mental status. EMS contacted by wife when pt noted unresponsive, aphasic and not moving extremities. In the ED, pt with aphasia and AMS. Given tPA for presumed CVA. Admitted to ICU for post-tPA for neuro monitoring. TTE performed- w/in normal limits. Patient had elevated troponin that have peaked likely demand ischemia in setting of CVA. Having fevers, on zosyn empirically. possibly due to central fever vs aspiration vs ?permcath. Repeat CT of the head for change in exam showed hemorrhagic conversion with SAH, and patient started having new onset of seizures. Patient subsequently went for an MRI and it confirmed no SAH. it read: Numerous acute diffuse bilateral supratentorial and infratentorial infarcts. On Coreg with BP in acceptable range.    Patient mental remained depressed and he was  intubated for airway protection. On PRVC: 12/420/5/30%.     Patient is EDRD and is receiving HD. During HD pt went into A fib which may be the possible cause of the strokes.     Started on Amiodarone drip for A fib.    Patient on tube feeds via ng tube. Having elevated fingersticks. Currently on insulin drip with hourly fingersticks.     TSH within normal limits- continue w/ levothyroxine.     Patient remains to have seizures. Neurology consulted and thinks he is in status epilepticus. he is on vimpat, keppra, and phenobarbital for seizures.     Patient will be transfer to [facility] MICU for Continuous EEG. Accepting physician is Dr. Campo. 64 year old man with a history of ESRD on HD ( via right chest permacath), CAD s/p PCI (6/2019), HTN, DM, hypothyroidism, L carotid stenosis.  Pt presented  to the ER for altered mental status. EMS contacted by wife when pt noted unresponsive, aphasic and not moving extremities. In the ED, pt with aphasia and AMS. Given tPA for presumed CVA. Admitted to ICU for post-tPA for neuro monitoring. TTE performed- w/in normal limits. Patient had elevated troponin that have peaked likely demand ischemia in setting of CVA. Having fevers, on zosyn empirically. possibly due to central fever vs aspiration vs ?permcath. Repeat CT of the head for change in exam showed hemorrhagic conversion with SAH, and patient started having new onset of seizures. Patient subsequently went for an MRI and it confirmed no SAH. it read: Numerous acute diffuse bilateral supratentorial and infratentorial infarcts. On Coreg with BP in acceptable range.    Patient mental remained depressed and he was intubated for airway protection. On PRVC: 12/420/5/30%.     Patient is ESRD and is receiving HD. During HD pt went into A fib which may be the possible cause of the strokes.     Started on Amiodarone drip for A fib.    Patient on tube feeds via ng tube. Having elevated fingersticks. Currently on insulin drip with hourly fingersticks.     TSH within normal limits- continue w/ levothyroxine.     Patient remains to have seizures. Neurology consulted and thinks he is in status epilepticus. he is on vimpat, keppra, and phenobarbital for seizures. On 12/18, patient noted to have persistent seizure despite vimpat, keppra and     phenobarb.  Patient started on Versed and Propofol for suppression.  [facility] Accepting physician is Dr. Pollard

## 2019-12-17 NOTE — PROVIDER CONTACT NOTE (EICU) - SITUATION
MICU admission
65 yo male with ESRD on HD (M-W-FRI without AVF as awaiting renal transplant expected within 3 months), CAD s/p stent 2009 and abnormal cath 9/2018 with 90% pRCA lesion (not intervened upon) with placement of drug eluting stent everolimus Synergy  3x 28 mm in June 2019, HTN, DM type 2, hypothyroidism and left carotid stenosis presents to the ED for altered mental status. Found with SAH s/p tpa for right sided weakness and aphsia. pt noted with sz activity overnight and was started on keppra which was uptitrated to 750mg. Pt just had witnessness sz for few seconds, on screen then noted with nystagmus. MRI today: Numerous acute diffuse bilateral supratentorial and   infratentorial infarcts.
GCS 3. Intubated Tonight.
Nurse reports a tonic clonic seizure lasting 40 seconds, decreased responsiveness afterwards, postictal.   Will start keppra, obtain repeat CT brain.

## 2019-12-17 NOTE — DISCHARGE NOTE PROVIDER - NSDCQMSTROKERISK_NEU_ALL_CORE
High cholesterol/High blood pressure/Diabetes/History of a stroke or TIA Diabetes/High cholesterol/High blood pressure/History of a stroke or TIA/Atrial fibrillation

## 2019-12-17 NOTE — PROGRESS NOTE ADULT - ASSESSMENT
Pt is a 65 yo M with h/o CAD s/p stent 2009 and abnormal cath 9/2018 with 90% pRCA lesion (not intervened upon) with placement of drug eluting stent everolimus Synergy  3x 28 mm in June 2019, HTN, DM,  L carotid stenosis, hypothyroidism and CKD stage 5  (M-W-FRI  awaiting renal transplant expected within 3 months. Pt presented  to the ER for altered mental status. EMS contacted by wife when pt noted unresponsive, aphasic and not moving extremities; pt Rx'd with TPA. 12/14 pt had CT neck/head; read as SAH. Early am 12/15 pt with Sz and started on Keppra.12/15 pt had MR head: Numerous acute diffuse bilateral supratentorial and infratentorial infarcts. CT/MR head reviewed with radiology. Pt did not have SAH it was contrast from the studies. Poor MS 2 to multiple cerebral infarcts. During HD pt went into A fib which may be the possible cause of the strokes. s/p intubation 12/16 for airway protection    Resp: s/p intubation 12/16 for airway protection  CVS: Pt restarted on Coreg with BP in acceptable range/ Started on Amio for A fib  HEME: Cont Asa + Plavix/ Probably should be on AC since the A fib is the likely cause of the multiple strokes  FEN: Cont enteral feeds/ Avoid hyponatremia  Endo: Pt hyperglycemic; started on Insulin drip and titrate to FS  Renal: HD as per Renal  Neuro: Discussed with Radio MR/MRA with Gadolinium/ Neuro checks q 1hr/ Seen by Neuro this evening and pt felt to be in NCSE; AEDs as per Neuro and transfer for vEEG  Social: Pt's medical condition discussed with pt's son and neuro status discussed by Neuro this evening    CCT: 40 min Pt is a 65 yo M with h/o CAD s/p stent 2009 and abnormal cath 9/2018 with 90% pRCA lesion (not intervened upon) with placement of drug eluting stent everolimus Synergy  3x 28 mm in June 2019, HTN, DM,  L carotid stenosis, hypothyroidism and CKD stage 5  (M-W-FRI  awaiting renal transplant expected within 3 months. Pt presented  to the ER for altered mental status. EMS contacted by wife when pt noted unresponsive, aphasic and not moving extremities; pt Rx'd with TPA. 12/14 pt had CT neck/head; read as SAH. Early am 12/15 pt with Sz and started on Keppra.12/15 pt had MR head: Numerous acute diffuse bilateral supratentorial and infratentorial infarcts. CT/MR head reviewed with radiology. Pt did not have SAH it was contrast from the studies. Poor MS 2 to multiple cerebral infarcts. During HD pt went into A fib which may be the possible cause of the strokes. s/p intubation 12/16 for airway protection    Resp: s/p intubation 12/16 for airway protection  ID: Pt with temp spike; panCx and may start Zosyn  CVS: Pt restarted on Coreg with BP in acceptable range/ Started on Amio for A fib  HEME: Cont Asa + Plavix/ Probably should be on AC since the A fib is the likely cause of the multiple strokes  FEN: Cont enteral feeds/ Avoid hyponatremia  Endo: Pt hyperglycemic; started on Insulin drip and titrate to FS  Renal: HD as per Renal  Neuro: Discussed with Radio MR/MRA with Gadolinium/ Neuro checks q 1hr/ Seen by Neuro this evening and pt felt to be in NCSE; AEDs as per Neuro and transfer for vEEG  Social: Pt's medical condition discussed with pt's son and neuro status discussed by Neuro this evening    CCT: 40 min

## 2019-12-17 NOTE — PROGRESS NOTE ADULT - SUBJECTIVE AND OBJECTIVE BOX
[facility]   MD Yokasta Gary D.O. Fatima Sheikh, D.O. Ruoru Wong, PA    From 7 AM - 5 PM:  OFFICE: [phone]  Dr. Barrientos cell: [phone]  Dr. Lopes cell: [phone]  Dr. Witt cell: [phone]  WALDO Valentin cell: [phone]    From 5 PM - 7 AM: Answering Service: [phone]      RENAL FOLLOW UP NOTE  --------------------------------------------------------------------------------  HPI: Pt seen and examined at bedside. Intubated    PAST HISTORY  --------------------------------------------------------------------------------  No significant changes to PMH, PSH, FHx, SHx, unless otherwise noted    ALLERGIES & MEDICATIONS  --------------------------------------------------------------------------------  Allergies    No Known Allergies    Intolerances      Standing Inpatient Medications  aspirin  chewable 81 milliGRAM(s) Oral daily  atorvastatin 80 milliGRAM(s) Oral at bedtime  carvedilol 3.125 milliGRAM(s) Oral every 12 hours  chlorhexidine 0.12% Liquid 15 milliLiter(s) Oral Mucosa every 12 hours  clopidogrel Tablet 75 milliGRAM(s) Oral daily  dexMEDEtomidine Infusion 0.2 MICROgram(s)/kG/Hr IV Continuous <Continuous>  dextrose 5%. 1000 milliLiter(s) IV Continuous <Continuous>  dextrose 50% Injectable 12.5 Gram(s) IV Push once  heparin  Injectable 5000 Unit(s) SubCutaneous every 12 hours  insulin lispro (HumaLOG) corrective regimen sliding scale   SubCutaneous every 6 hours  lacosamide IVPB 50 milliGRAM(s) IV Intermittent every 12 hours  levETIRAcetam  IVPB 750 milliGRAM(s) IV Intermittent every 12 hours  levothyroxine Injectable 25 MICROGram(s) IV Push at bedtime  pantoprazole  Injectable 40 milliGRAM(s) IV Push daily    PRN Inpatient Medications  acetaminophen    Suspension .. 650 milliGRAM(s) Oral every 6 hours PRN  dextrose 40% Gel 15 Gram(s) Oral once PRN  glucagon  Injectable 1 milliGRAM(s) IntraMuscular once PRN      REVIEW OF SYSTEMS  --------------------------------------------------------------------------------  Unable to obtain     VITALS/PHYSICAL EXAM  --------------------------------------------------------------------------------  T(C): 38.1 (12-17-19 @ 08:30), Max: 38.5 (12-17-19 @ 07:22)  HR: 72 (12-17-19 @ 08:41) (71 - 116)  BP: 124/52 (12-17-19 @ 08:30) (116/44 - 188/70)  RR: 19 (12-17-19 @ 08:30) (0 - 113)  SpO2: 100% (12-17-19 @ 08:41) (97% - 100%)  Wt(kg): --        12-16-19 @ 07:01  -  12-17-19 @ 07:00  --------------------------------------------------------  IN: 1485 mL / OUT: 1000 mL / NET: 485 mL      Physical Exam:  	Gen: NAD intubated  	HEENT: MMM  	Pulm: CTA B/L  	CV: S1S2  	Abd: Soft, +BS  	Ext: No LE edema B/L  	Skin: Warm and Dry   	Vascular access: + tunneled HD catheter     LABS/STUDIES  --------------------------------------------------------------------------------              8.6    11.50 >-----------<  219      [12-17-19 @ 03:07]              27.3     143  |  109  |  51  ----------------------------<  293      [12-17-19 @ 03:07]  4.0   |  27  |  5.53        Ca     7.9     [12-17-19 @ 03:07]      Mg     2.3     [12-17-19 @ 03:07]      Phos  2.2     [12-17-19 @ 03:07]    TPro  7.0  /  Alb  2.3  /  TBili  0.4  /  DBili  x   /  AST  43  /  ALT  18  /  AlkPhos  88  [12-17-19 @ 03:07]      Creatinine Trend:  SCr 5.53 [12-17 @ 03:07]  SCr 6.73 [12-16 @ 04:27]  SCr 5.70 [12-15 @ 15:59]  SCr 4.39 [12-15 @ 05:50]  SCr 3.30 [12-14 @ 22:30]    Urinalysis - [12-14-19 @ 01:09]      Color Yellow / Appearance Clear / SG 1.005 / pH 7.0      Gluc 50 / Ketone Negative  / Bili Negative / Urobili Negative       Blood Trace / Protein 500 / Leuk Est Negative / Nitrite Negative      RBC 3-5 / WBC 0-2 / Hyaline  / Gran  / Sq Epi  / Non Sq Epi Occasional / Bacteria Few      Iron 26, TIBC 205, %sat 13      [06-04-19 @ 09:27]  Ferritin 99      [06-04-19 @ 09:33]  PTH -- (Ca 6.9)      [06-05-19 @ 08:14]   562  HbA1c 8.4      [12-15-19 @ 11:00]  TSH 1.160      [12-14-19 @ 06:30]  Lipid: chol 134, , HDL 56, LDL 52      [12-15-19 @ 10:56]    HBsAb >1000.0      [12-14-19 @ 23:48]  HBsAg Nonreact      [12-14-19 @ 23:48]  HCV 0.15, Nonreact      [12-14-19 @ 23:48]

## 2019-12-17 NOTE — PROGRESS NOTE ADULT - ASSESSMENT
Subjective Complaints:  Historian:             Vital Signs Last 24 Hrs  T(C): 38.2 (17 Dec 2019 15:47), Max: 38.5 (17 Dec 2019 07:22)  T(F): 100.7 (17 Dec 2019 15:47), Max: 101.3 (17 Dec 2019 07:22)  HR: 66 (17 Dec 2019 19:00) (64 - 99)  BP: 139/60 (17 Dec 2019 19:00) (110/46 - 180/72)  BP(mean): 78 (17 Dec 2019 19:00) (61 - 99)  RR: 22 (17 Dec 2019 19:00) (15 - 27)  SpO2: 100% (17 Dec 2019 19:00) (99% - 100%)    GENERAL PHYSICAL EXAM:  General:  Appears stated age, well-groomed, well-nourished, no distress  HEENT:  NC/AT, patent nares w/ pink mucosa, OP clear w/o lesions, PERRL, EOMI, conjunctivae clear, no thyromegaly, nodules, adenopathy, no JVD  Chest:  Full & symmetric excursion, no increased effort, breath sounds clear  Cardiovascular:  Regular rhythm, S1, S2, no murmur/rub/S3/S4, no carotid/femoral/abdominal bruit, radial/pedal pulses 2+, no edema  Abdomen:  Soft, non-tender, non-distended, normoactive bowel sounds, no HSM  Extremities:  Gait & station:   Digits:   Nails:   Joints, Bones, Muscles:   ROM:   Stability:  Skin:  No rash/erythema/ecchymoses/petechiae/wounds/abscess/warm/dry  Musculoskeletal:  Full ROM in all joints w/o swelling/tenderness/effusion        LABS:                        8.6    11.50 )-----------( 219      ( 17 Dec 2019 03:07 )             27.3     12-17    143  |  109<H>  |  51<H>  ----------------------------<  293<H>  4.0   |  27  |  5.53<H>    Ca    7.9<L>      17 Dec 2019 03:07  Phos  2.2     12-17  Mg     2.3     12-17    TPro  7.0  /  Alb  2.3<L>  /  TBili  0.4  /  DBili  x   /  AST  43<H>  /  ALT  18  /  AlkPhos  88  12-17          RADIOLOGY & ADDITIONAL STUDIES:        Neurology Progress Note:      Mental Status:    events  noted intubated   inresponsive tries to open eyes   does not follows commands        Cranial Nerves: pupils reacts to light equal  face look equal  neck supple       Motor:   qiuadroparesis         Sensory:responds to pain       Cerebellar: defrd       Gait: deferd       Assesment/Plan:  resp failure had afib yesterday on amiodarone  drip eeg in status  non convulsive  status discuss  with son md. possible  [facility] to [facility]  for video  eeg  on keppra and vimpat add phenobarb esrd on  dialysis  s/p tpa multiple dave new infarctions  will follow

## 2019-12-17 NOTE — PROVIDER CONTACT NOTE (EICU) - BACKGROUND
64 year old male with h/o CAD s/p stent in past with h/o CKD (on renal transplant list) was brought to ED with AMS and unable to move right side s/o TPA in ED with NIH scale of 20. Patient was admitted to ICU for monitoring. Repeat CT head showed mild hemorrhagic conversion with brain edema. Tele neurosurgery was consulted.
Admitted for CVA.

## 2019-12-18 LAB
ALBUMIN SERPL ELPH-MCNC: 2.3 G/DL — LOW (ref 3.3–5)
ALP SERPL-CCNC: 79 U/L — SIGNIFICANT CHANGE UP (ref 40–120)
ALT FLD-CCNC: 16 U/L — SIGNIFICANT CHANGE UP (ref 12–78)
ANION GAP SERPL CALC-SCNC: 10 MMOL/L — SIGNIFICANT CHANGE UP (ref 5–17)
AST SERPL-CCNC: 26 U/L — SIGNIFICANT CHANGE UP (ref 15–37)
BASOPHILS # BLD AUTO: 0.03 K/UL — SIGNIFICANT CHANGE UP (ref 0–0.2)
BASOPHILS NFR BLD AUTO: 0.2 % — SIGNIFICANT CHANGE UP (ref 0–2)
BILIRUB SERPL-MCNC: 0.3 MG/DL — SIGNIFICANT CHANGE UP (ref 0.2–1.2)
BUN SERPL-MCNC: 81 MG/DL — HIGH (ref 7–23)
CALCIUM SERPL-MCNC: 8.5 MG/DL — SIGNIFICANT CHANGE UP (ref 8.5–10.1)
CHLORIDE SERPL-SCNC: 110 MMOL/L — HIGH (ref 96–108)
CO2 SERPL-SCNC: 24 MMOL/L — SIGNIFICANT CHANGE UP (ref 22–31)
CREAT SERPL-MCNC: 7.43 MG/DL — HIGH (ref 0.5–1.3)
EOSINOPHIL # BLD AUTO: 0.38 K/UL — SIGNIFICANT CHANGE UP (ref 0–0.5)
EOSINOPHIL NFR BLD AUTO: 3.1 % — SIGNIFICANT CHANGE UP (ref 0–6)
GLUCOSE BLDC GLUCOMTR-MCNC: 143 MG/DL — HIGH (ref 70–99)
GLUCOSE BLDC GLUCOMTR-MCNC: 148 MG/DL — HIGH (ref 70–99)
GLUCOSE BLDC GLUCOMTR-MCNC: 154 MG/DL — HIGH (ref 70–99)
GLUCOSE BLDC GLUCOMTR-MCNC: 166 MG/DL — HIGH (ref 70–99)
GLUCOSE BLDC GLUCOMTR-MCNC: 173 MG/DL — HIGH (ref 70–99)
GLUCOSE BLDC GLUCOMTR-MCNC: 185 MG/DL — HIGH (ref 70–99)
GLUCOSE BLDC GLUCOMTR-MCNC: 185 MG/DL — HIGH (ref 70–99)
GLUCOSE BLDC GLUCOMTR-MCNC: 188 MG/DL — HIGH (ref 70–99)
GLUCOSE BLDC GLUCOMTR-MCNC: 188 MG/DL — HIGH (ref 70–99)
GLUCOSE BLDC GLUCOMTR-MCNC: 192 MG/DL — HIGH (ref 70–99)
GLUCOSE BLDC GLUCOMTR-MCNC: 194 MG/DL — HIGH (ref 70–99)
GLUCOSE BLDC GLUCOMTR-MCNC: 200 MG/DL — HIGH (ref 70–99)
GLUCOSE BLDC GLUCOMTR-MCNC: 200 MG/DL — HIGH (ref 70–99)
GLUCOSE BLDC GLUCOMTR-MCNC: 203 MG/DL — HIGH (ref 70–99)
GLUCOSE BLDC GLUCOMTR-MCNC: 207 MG/DL — HIGH (ref 70–99)
GLUCOSE BLDC GLUCOMTR-MCNC: 219 MG/DL — HIGH (ref 70–99)
GLUCOSE BLDC GLUCOMTR-MCNC: 226 MG/DL — HIGH (ref 70–99)
GLUCOSE BLDC GLUCOMTR-MCNC: 236 MG/DL — HIGH (ref 70–99)
GLUCOSE BLDC GLUCOMTR-MCNC: 238 MG/DL — HIGH (ref 70–99)
GLUCOSE BLDC GLUCOMTR-MCNC: 264 MG/DL — HIGH (ref 70–99)
GLUCOSE BLDC GLUCOMTR-MCNC: 275 MG/DL — HIGH (ref 70–99)
GLUCOSE BLDC GLUCOMTR-MCNC: 307 MG/DL — HIGH (ref 70–99)
GLUCOSE SERPL-MCNC: 172 MG/DL — HIGH (ref 70–99)
HCT VFR BLD CALC: 29.4 % — LOW (ref 39–50)
HGB BLD-MCNC: 9.1 G/DL — LOW (ref 13–17)
IMM GRANULOCYTES NFR BLD AUTO: 0.4 % — SIGNIFICANT CHANGE UP (ref 0–1.5)
LYMPHOCYTES # BLD AUTO: 1.9 K/UL — SIGNIFICANT CHANGE UP (ref 1–3.3)
LYMPHOCYTES # BLD AUTO: 15.5 % — SIGNIFICANT CHANGE UP (ref 13–44)
MAGNESIUM SERPL-MCNC: 2.7 MG/DL — HIGH (ref 1.6–2.6)
MCHC RBC-ENTMCNC: 28.8 PG — SIGNIFICANT CHANGE UP (ref 27–34)
MCHC RBC-ENTMCNC: 31 GM/DL — LOW (ref 32–36)
MCV RBC AUTO: 93 FL — SIGNIFICANT CHANGE UP (ref 80–100)
MONOCYTES # BLD AUTO: 1.24 K/UL — HIGH (ref 0–0.9)
MONOCYTES NFR BLD AUTO: 10.1 % — SIGNIFICANT CHANGE UP (ref 2–14)
NEUTROPHILS # BLD AUTO: 8.67 K/UL — HIGH (ref 1.8–7.4)
NEUTROPHILS NFR BLD AUTO: 70.7 % — SIGNIFICANT CHANGE UP (ref 43–77)
NRBC # BLD: 0 /100 WBCS — SIGNIFICANT CHANGE UP (ref 0–0)
PHOSPHATE SERPL-MCNC: 2.2 MG/DL — LOW (ref 2.5–4.5)
PLATELET # BLD AUTO: 232 K/UL — SIGNIFICANT CHANGE UP (ref 150–400)
POTASSIUM SERPL-MCNC: 4.1 MMOL/L — SIGNIFICANT CHANGE UP (ref 3.5–5.3)
POTASSIUM SERPL-SCNC: 4.1 MMOL/L — SIGNIFICANT CHANGE UP (ref 3.5–5.3)
PROT SERPL-MCNC: 7.2 GM/DL — SIGNIFICANT CHANGE UP (ref 6–8.3)
RBC # BLD: 3.16 M/UL — LOW (ref 4.2–5.8)
RBC # FLD: 18.9 % — HIGH (ref 10.3–14.5)
SODIUM SERPL-SCNC: 144 MMOL/L — SIGNIFICANT CHANGE UP (ref 135–145)
WBC # BLD: 12.27 K/UL — HIGH (ref 3.8–10.5)
WBC # FLD AUTO: 12.27 K/UL — HIGH (ref 3.8–10.5)

## 2019-12-18 PROCEDURE — 99291 CRITICAL CARE FIRST HOUR: CPT

## 2019-12-18 RX ORDER — HYDRALAZINE HCL 50 MG
50 TABLET ORAL ONCE
Refills: 0 | Status: COMPLETED | OUTPATIENT
Start: 2019-12-18 | End: 2019-12-18

## 2019-12-18 RX ORDER — AMIODARONE HYDROCHLORIDE 400 MG/1
100 TABLET ORAL DAILY
Refills: 0 | Status: DISCONTINUED | OUTPATIENT
Start: 2019-12-18 | End: 2019-12-19

## 2019-12-18 RX ORDER — MIDAZOLAM HYDROCHLORIDE 1 MG/ML
0.09 INJECTION, SOLUTION INTRAMUSCULAR; INTRAVENOUS
Qty: 100 | Refills: 0 | Status: DISCONTINUED | OUTPATIENT
Start: 2019-12-18 | End: 2019-12-19

## 2019-12-18 RX ORDER — CARVEDILOL PHOSPHATE 80 MG/1
6.25 CAPSULE, EXTENDED RELEASE ORAL EVERY 12 HOURS
Refills: 0 | Status: DISCONTINUED | OUTPATIENT
Start: 2019-12-18 | End: 2019-12-19

## 2019-12-18 RX ORDER — CARVEDILOL PHOSPHATE 80 MG/1
3.12 CAPSULE, EXTENDED RELEASE ORAL ONCE
Refills: 0 | Status: COMPLETED | OUTPATIENT
Start: 2019-12-18 | End: 2019-12-18

## 2019-12-18 RX ORDER — PROPOFOL 10 MG/ML
30 INJECTION, EMULSION INTRAVENOUS
Qty: 1000 | Refills: 0 | Status: DISCONTINUED | OUTPATIENT
Start: 2019-12-18 | End: 2019-12-19

## 2019-12-18 RX ORDER — PANTOPRAZOLE SODIUM 20 MG/1
40 TABLET, DELAYED RELEASE ORAL DAILY
Refills: 0 | Status: DISCONTINUED | OUTPATIENT
Start: 2019-12-18 | End: 2019-12-19

## 2019-12-18 RX ADMIN — AMIODARONE HYDROCHLORIDE 100 MILLIGRAM(S): 400 TABLET ORAL at 17:53

## 2019-12-18 RX ADMIN — LEVETIRACETAM 400 MILLIGRAM(S): 250 TABLET, FILM COATED ORAL at 05:41

## 2019-12-18 RX ADMIN — LEVETIRACETAM 400 MILLIGRAM(S): 250 TABLET, FILM COATED ORAL at 17:53

## 2019-12-18 RX ADMIN — CARVEDILOL PHOSPHATE 3.12 MILLIGRAM(S): 80 CAPSULE, EXTENDED RELEASE ORAL at 21:12

## 2019-12-18 RX ADMIN — Medication 650 MILLIGRAM(S): at 23:18

## 2019-12-18 RX ADMIN — CHLORHEXIDINE GLUCONATE 15 MILLILITER(S): 213 SOLUTION TOPICAL at 05:40

## 2019-12-18 RX ADMIN — HEPARIN SODIUM 5000 UNIT(S): 5000 INJECTION INTRAVENOUS; SUBCUTANEOUS at 05:40

## 2019-12-18 RX ADMIN — Medication 50 MILLIGRAM(S): at 15:58

## 2019-12-18 RX ADMIN — MIDAZOLAM HYDROCHLORIDE 6 MG/KG/HR: 1 INJECTION, SOLUTION INTRAMUSCULAR; INTRAVENOUS at 22:07

## 2019-12-18 RX ADMIN — Medication 100 MILLIGRAM(S): at 05:40

## 2019-12-18 RX ADMIN — PIPERACILLIN AND TAZOBACTAM 25 GRAM(S): 4; .5 INJECTION, POWDER, LYOPHILIZED, FOR SOLUTION INTRAVENOUS at 17:53

## 2019-12-18 RX ADMIN — Medication 100 MILLIGRAM(S): at 14:05

## 2019-12-18 RX ADMIN — Medication 100 MILLIGRAM(S): at 21:16

## 2019-12-18 RX ADMIN — CARVEDILOL PHOSPHATE 3.12 MILLIGRAM(S): 80 CAPSULE, EXTENDED RELEASE ORAL at 05:40

## 2019-12-18 RX ADMIN — PIPERACILLIN AND TAZOBACTAM 25 GRAM(S): 4; .5 INJECTION, POWDER, LYOPHILIZED, FOR SOLUTION INTRAVENOUS at 05:41

## 2019-12-18 RX ADMIN — Medication 650 MILLIGRAM(S): at 14:59

## 2019-12-18 RX ADMIN — PROPOFOL 12.47 MICROGRAM(S)/KG/MIN: 10 INJECTION, EMULSION INTRAVENOUS at 22:07

## 2019-12-18 RX ADMIN — Medication 650 MILLIGRAM(S): at 06:00

## 2019-12-18 RX ADMIN — LACOSAMIDE 110 MILLIGRAM(S): 50 TABLET ORAL at 05:41

## 2019-12-18 RX ADMIN — PANTOPRAZOLE SODIUM 40 MILLIGRAM(S): 20 TABLET, DELAYED RELEASE ORAL at 13:54

## 2019-12-18 RX ADMIN — Medication 650 MILLIGRAM(S): at 04:40

## 2019-12-18 RX ADMIN — ATORVASTATIN CALCIUM 80 MILLIGRAM(S): 80 TABLET, FILM COATED ORAL at 21:12

## 2019-12-18 RX ADMIN — HEPARIN SODIUM 5000 UNIT(S): 5000 INJECTION INTRAVENOUS; SUBCUTANEOUS at 17:53

## 2019-12-18 RX ADMIN — Medication 650 MILLIGRAM(S): at 14:08

## 2019-12-18 RX ADMIN — Medication 81 MILLIGRAM(S): at 13:55

## 2019-12-18 RX ADMIN — Medication 25 MICROGRAM(S): at 21:12

## 2019-12-18 RX ADMIN — LACOSAMIDE 110 MILLIGRAM(S): 50 TABLET ORAL at 18:59

## 2019-12-18 RX ADMIN — CARVEDILOL PHOSPHATE 3.12 MILLIGRAM(S): 80 CAPSULE, EXTENDED RELEASE ORAL at 17:53

## 2019-12-18 RX ADMIN — CLOPIDOGREL BISULFATE 75 MILLIGRAM(S): 75 TABLET, FILM COATED ORAL at 13:55

## 2019-12-18 RX ADMIN — CHLORHEXIDINE GLUCONATE 15 MILLILITER(S): 213 SOLUTION TOPICAL at 17:53

## 2019-12-18 NOTE — PROGRESS NOTE ADULT - ASSESSMENT
Pt is a 63 yo M with h/o CAD s/p stent 2009 and abnormal cath 9/2018 with 90% pRCA lesion (not intervened upon) with placement of drug eluting stent everolimus Synergy  3x 28 mm in June 2019, HTN, DM,  L carotid stenosis, hypothyroidism and CKD stage 5  (M-W-FRI  awaiting renal transplant expected within 3 months. Pt presented  to the ER for altered mental status. EMS contacted by wife when pt noted unresponsive, aphasic and not moving extremities; pt Rx'd with TPA. 12/14 pt had CT neck/head; read as SAH. Early am 12/15 pt with Sz and started on Keppra.12/15 pt had MR head: Numerous acute diffuse bilateral supratentorial and infratentorial infarcts. CT/MR head reviewed with radiology. Pt did not have SAH it was contrast from the studies. Poor MS 2 to multiple cerebral infarcts. During HD pt went into A fib which may be the possible cause of the strokes. s/p intubation 12/16 for airway protection    Resp: s/p intubation 12/16 for airway protection  ID: Pt with temp spike; cont empiric Zosyn  CVS: May need to increase Coreg 2 to HTN/ s/p IV Amio load for A fib and may change to po  HEME: Cont Asa + Plavix/ Probably should be on AC since the A fib is the likely cause of the multiple strokes  FEN: Cont enteral feeds/ Avoid hyponatremia  Endo: No longer hyperglycemic on Insulin drip   Renal: HD as per Renal  Neuro: Cont neuro checks q 1hr/ Seen by Neuro 12/17 pm and pt felt to be in NCSE; AEDs as per Neuro and transfer for vEEG  Social: Pt's medical condition discussed with pt's son and family members/ Transfer to [facility] for vEEG    CCT: 45 min

## 2019-12-18 NOTE — PROGRESS NOTE ADULT - ASSESSMENT
63 yo male with ESRD on HD (M-W-FRI without AVF as awaiting renal transplant expected within 3 months), CAD s/p stent 2009 and abnormal cath 9/2018 with 90% pRCA lesion (not intervened upon) with placement of drug eluting stent everolimus Synergy  3x 28 mm in June 2019, HTN, DM type 2, hypothyroidism and left carotid stenosis admitted for altered mental status.    ESRD on HD  Last HD Monday  Plan for IHD today. Either at  or [facility] (if pt transferred)   Continue low blood flow treatment   Will need to do dialysis dosing for antiseizure meds.   Pt will need additional Keppra immediately post HD on HD days   Dialysis dosing for all medications     Hyperkalemia  Resolved    Anemia  Hb low  Unable to use epogen due to acute/ multiple strokes  Will need to transfuse PRN Hb <7     HTN  As per MICU  Will avoid hypotension/ UF with HD  Controlled at bedside

## 2019-12-18 NOTE — PROGRESS NOTE ADULT - ASSESSMENT
Subjective Complaints:  Historian:             Vital Signs Last 24 Hrs  T(C): 36.5 (18 Dec 2019 17:50), Max: 38.7 (17 Dec 2019 22:07)  T(F): 97.7 (18 Dec 2019 17:50), Max: 101.7 (17 Dec 2019 22:07)  HR: 68 (18 Dec 2019 21:30) (61 - 77)  BP: 153/56 (18 Dec 2019 21:30) (118/52 - 209/72)  BP(mean): 81 (18 Dec 2019 21:30) (68 - 106)  RR: 16 (18 Dec 2019 21:30) (15 - 26)  SpO2: 100% (18 Dec 2019 21:30) (100% - 100%)    GENERAL PHYSICAL EXAM:  General:  Appears stated age, well-groomed, well-nourished, no distress  HEENT:  NC/AT, patent nares w/ pink mucosa, OP clear w/o lesions, PERRL, EOMI, conjunctivae clear, no thyromegaly, nodules, adenopathy, no JVD  Chest:  Full & symmetric excursion, no increased effort, breath sounds clear  Cardiovascular:  Regular rhythm, S1, S2, no murmur/rub/S3/S4, no carotid/femoral/abdominal bruit, radial/pedal pulses 2+, no edema  Abdomen:  Soft, non-tender, non-distended, normoactive bowel sounds, no HSM  Extremities:  Gait & station:   Digits:   Nails:   Joints, Bones, Muscles:   ROM:   Stability:  Skin:  No rash/erythema/ecchymoses/petechiae/wounds/abscess/warm/dry  Musculoskeletal:  Full ROM in all joints w/o swelling/tenderness/effusion        LABS:                        9.1    12.27 )-----------( 232      ( 18 Dec 2019 03:52 )             29.4     12-18    144  |  110<H>  |  81<H>  ----------------------------<  172<H>  4.1   |  24  |  7.43<H>    Ca    8.5      18 Dec 2019 03:52  Phos  2.2     12-18  Mg     2.7     12-18    TPro  7.2  /  Alb  2.3<L>  /  TBili  0.3  /  DBili  x   /  AST  26  /  ALT  16  /  AlkPhos  79  12-18          RADIOLOGY & ADDITIONAL STUDIES:        Neurology Progress Note:      Mental Status: remain intubated unresponsive       Cranial Nerves: pupils reacts  to light  facr looks equal       Motor:  quadroparesis         Sensory:responds to pain       Cerebellar: defrd       Gait:defrd       Assesment/Plan:  events noted on asa and plavix  s/p tpa dave infarctions eeg  non convulsive status on 3 meds for seziure versed  drip for seziure discuss with son in detail awaiting bed at [facility]  for video  eeg  esrd on dilaysis

## 2019-12-18 NOTE — PROGRESS NOTE ADULT - SUBJECTIVE AND OBJECTIVE BOX
[facility] [place] [facility]   MD Yokasta Gary D.O. Fatima Sheikh, D.O. Ruoru Wong, PA    From 7 AM - 5 PM:  OFFICE: [phone]  Dr. Barrientos cell: [phone]  Dr. Lopes cell: [phone]  Dr. Witt cell: [phone]  WALDO Valentin cell: [phone]    From 5 PM - 7 AM: Answering Service: [phone]      RENAL FOLLOW UP NOTE  --------------------------------------------------------------------------------  HPI: Pt seen and examined at bedside.     PAST HISTORY  --------------------------------------------------------------------------------  No significant changes to PMH, PSH, FHx, SHx, unless otherwise noted    ALLERGIES & MEDICATIONS  --------------------------------------------------------------------------------  Allergies    No Known Allergies    Intolerances      Standing Inpatient Medications  aMIOdarone Infusion 1 mG/Min IV Continuous <Continuous>  aMIOdarone Infusion 0.5 mG/Min IV Continuous <Continuous>  aspirin  chewable 81 milliGRAM(s) Oral daily  atorvastatin 80 milliGRAM(s) Oral at bedtime  carvedilol 3.125 milliGRAM(s) Oral every 12 hours  chlorhexidine 0.12% Liquid 15 milliLiter(s) Oral Mucosa every 12 hours  clopidogrel Tablet 75 milliGRAM(s) Oral daily  dexMEDEtomidine Infusion 0.2 MICROgram(s)/kG/Hr IV Continuous <Continuous>  dextrose 5%. 1000 milliLiter(s) IV Continuous <Continuous>  dextrose 50% Injectable 12.5 Gram(s) IV Push once  heparin  Injectable 5000 Unit(s) SubCutaneous every 12 hours  insulin regular Infusion 8 Unit(s)/Hr IV Continuous <Continuous>  lacosamide IVPB 50 milliGRAM(s) IV Intermittent every 12 hours  levETIRAcetam  IVPB 250 milliGRAM(s) IV Intermittent every 12 hours  levothyroxine Injectable 25 MICROGram(s) IV Push at bedtime  pantoprazole  Injectable 40 milliGRAM(s) IV Push daily  PHENobarbital Injectable 100 milliGRAM(s) IV Push every 8 hours  piperacillin/tazobactam IVPB.. 3.375 Gram(s) IV Intermittent every 12 hours    PRN Inpatient Medications  acetaminophen    Suspension .. 650 milliGRAM(s) Oral every 6 hours PRN  dextrose 40% Gel 15 Gram(s) Oral once PRN  glucagon  Injectable 1 milliGRAM(s) IntraMuscular once PRN      REVIEW OF SYSTEMS  --------------------------------------------------------------------------------  Unable to obtain     VITALS/PHYSICAL EXAM  --------------------------------------------------------------------------------  T(C): 37.6 (12-18-19 @ 07:27), Max: 38.8 (12-17-19 @ 20:42)  HR: 69 (12-18-19 @ 09:30) (62 - 89)  BP: 145/57 (12-18-19 @ 09:30) (121/50 - 177/68)  RR: 21 (12-18-19 @ 09:30) (15 - 26)  SpO2: 100% (12-18-19 @ 09:30) (100% - 100%)  Wt(kg): --        12-17-19 @ 07:01  -  12-18-19 @ 07:00  --------------------------------------------------------  IN: 2000.2 mL / OUT: 0 mL / NET: 2000.2 mL      Physical Exam:  	Gen: NAD  	HEENT: intubated  	Pulm: CTA B/L  	CV: S1S2  	Abd: Soft, +BS  	Ext: No LE edema B/L  	Skin: Warm and Dry   	Vascular access: + tunneled HD catheter     LABS/STUDIES  --------------------------------------------------------------------------------              9.1    12.27 >-----------<  232      [12-18-19 @ 03:52]              29.4     144  |  110  |  81  ----------------------------<  172      [12-18-19 @ 03:52]  4.1   |  24  |  7.43        Ca     8.5     [12-18-19 @ 03:52]      Mg     2.7     [12-18-19 @ 03:52]      Phos  2.2     [12-18-19 @ 03:52]    TPro  7.2  /  Alb  2.3  /  TBili  0.3  /  DBili  x   /  AST  26  /  ALT  16  /  AlkPhos  79  [12-18-19 @ 03:52]      Creatinine Trend:  SCr 7.43 [12-18 @ 03:52]  SCr 5.53 [12-17 @ 03:07]  SCr 6.73 [12-16 @ 04:27]  SCr 5.70 [12-15 @ 15:59]  SCr 4.39 [12-15 @ 05:50]    Urinalysis - [12-14-19 @ 01:09]      Color Yellow / Appearance Clear / SG 1.005 / pH 7.0      Gluc 50 / Ketone Negative  / Bili Negative / Urobili Negative       Blood Trace / Protein 500 / Leuk Est Negative / Nitrite Negative      RBC 3-5 / WBC 0-2 / Hyaline  / Gran  / Sq Epi  / Non Sq Epi Occasional / Bacteria Few      Iron 26, TIBC 205, %sat 13      [06-04-19 @ 09:27]  Ferritin 99      [06-04-19 @ 09:33]  PTH -- (Ca 6.9)      [06-05-19 @ 08:14]   562  HbA1c 8.4      [12-15-19 @ 11:00]  TSH 1.160      [12-14-19 @ 06:30]  Lipid: chol 134, , HDL 56, LDL 52      [12-15-19 @ 10:56]    HBsAb >1000.0      [12-14-19 @ 23:48]  HBsAg Nonreact      [12-14-19 @ 23:48]  HCV 0.15, Nonreact      [12-14-19 @ 23:48]

## 2019-12-18 NOTE — CHART NOTE - NSCHARTNOTEFT_GEN_A_CORE
Attending Critical Care Note    Discussed with Dr. Sena from neurology at 2115 who reviewed the EEG from this morning and noted patient remains in status epilepticus despite phenobarb, vimpat and keppra.  Will add propofol and versed.      Discussed case with MICU Dr. Sheldon and NSICU Dr. Pollard for a transfer.  Discussed management with adding Propofol 30mcg/kg/min and Versed 5mg/hr with Dr. Pollard of NSICU who agrees with the management.     I updated patient's son Babatunde Castelan [phone] regarding the findings.      Will continue to follow.      Jacob Colon Attending Critical Care Note    Discussed with Dr. Sena from neurology at 2115 who reviewed the EEG from this morning and noted patient remains in status epilepticus despite phenobarb, vimpat and keppra.  Will add propofol and versed.      On exam, patient noted to be unchanged remains non-responsive, pupillary reflex with 3mm sluggish bilaterally, corneal reflex intact, gag reflex intact, overbreathing vent, eyes open to painful stimuli, no motor response to pain.      Discussed case with MICU Dr. Sheldon and NSICU Dr. Pollard for a transfer.  Discussed management with adding Propofol 30mcg/kg/min and Versed 5mg/hr [facility] with [facility] management.     I updated patient's son Babatunde Castelan [phone] regarding the findings.      Will continue to follow.  Awaiting bed at [facility] for 24 hour video EEG monitoring.    Jacob Colon

## 2019-12-18 NOTE — PROGRESS NOTE ADULT - SUBJECTIVE AND OBJECTIVE BOX
HPI:  Pt is a 63 yo M with h/o CAD s/p stent 2009 and abnormal cath 9/2018 with 90% pRCA lesion (not intervened upon) with placement of drug eluting stent everolimus Synergy  3x 28 mm in June 2019, HTN, DM,  L carotid stenosis, hypothyroidism and CKD stage 5  (M-W-FRI  awaiting renal transplant expected within 3 months. Pt presented  to the ER for altered mental status. EMS contacted by wife when pt noted unresponsive, aphasic and not moving extremities; pt Rx'd with TPA. 12/14 pt had CT neck/head; read as SAH. Early am 12/15 pt with Sz and started on Keppra.12/15 pt had MR head: Numerous acute diffuse bilateral supratentorial and infratentorial infarcts. CT/MR head reviewed with radiology. Pt did not have SAH it was contrast from the studies. Poor MS 2 to multiple cerebral infarcts. During HD pt went into A fib which may be the possible cause of the strokes. s/p intubation 12/16 for airway protection      ## Labs:  CBC:                        9.1    12.27 )-----------( 232      ( 18 Dec 2019 03:52 )             29.4     Chem:  12-18    144  |  110<H>  |  81<H>  ----------------------------<  172<H>  4.1   |  24  |  7.43<H>    Ca    8.5      18 Dec 2019 03:52  Phos  2.2     12-18  Mg     2.7     12-18    TPro  7.2  /  Alb  2.3<L>  /  TBili  0.3  /  DBili  x   /  AST  26  /  ALT  16  /  AlkPhos  79  12-18    Coags:          ## Imaging:    ## Medications:  piperacillin/tazobactam IVPB.. 3.375 Gram(s) IV Intermittent every 12 hours    aMIOdarone    Tablet 100 milliGRAM(s) Oral daily  aMIOdarone Infusion 1 mG/Min IV Continuous <Continuous>  carvedilol 3.125 milliGRAM(s) Oral every 12 hours      atorvastatin 80 milliGRAM(s) Oral at bedtime  dextrose 40% Gel 15 Gram(s) Oral once PRN  dextrose 50% Injectable 12.5 Gram(s) IV Push once  glucagon  Injectable 1 milliGRAM(s) IntraMuscular once PRN  insulin regular Infusion 8 Unit(s)/Hr IV Continuous <Continuous>  levothyroxine Injectable 25 MICROGram(s) IV Push at bedtime    aspirin  chewable 81 milliGRAM(s) Oral daily  clopidogrel Tablet 75 milliGRAM(s) Oral daily  heparin  Injectable 5000 Unit(s) SubCutaneous every 12 hours    pantoprazole   Suspension 40 milliGRAM(s) Oral daily    acetaminophen    Suspension .. 650 milliGRAM(s) Oral every 6 hours PRN  dexMEDEtomidine Infusion 0.2 MICROgram(s)/kG/Hr IV Continuous <Continuous>  lacosamide IVPB 50 milliGRAM(s) IV Intermittent every 12 hours  levETIRAcetam  IVPB 250 milliGRAM(s) IV Intermittent every 12 hours  PHENobarbital Injectable 100 milliGRAM(s) IV Push every 8 hours      ## Vitals:  T(C): 37.3 (12-18-19 @ 15:00), Max: 38.8 (12-17-19 @ 20:42)  HR: 67 (12-18-19 @ 17:00) (61 - 77)  BP: 199/64 (12-18-19 @ 17:00) (118/52 - 209/72)  BP(mean): 99 (12-18-19 @ 17:00) (68 - 106)  RR: 22 (12-18-19 @ 17:00) (15 - 26)  SpO2: 100% (12-18-19 @ 17:00) (100% - 100%)  Wt(kg): --  Vent: Mode: AC/ CMV (Assist Control/ Continuous Mandatory Ventilation), RR (machine): 12, RR (patient): 23, TV (machine): 420, FiO2: 30, PEEP: 5, PIP: 18  ABG: ABG - ( 17 Dec 2019 03:03 )  pH, Arterial: x     pH, Blood: 7.51  /  pCO2: 33    /  pO2: 215   / HCO3: 26    / Base Excess: 3.5   /  SaO2: 99                    12-17 @ 07:01  -  12-18 @ 07:00  --------------------------------------------------------  IN: 2004.2 mL / OUT: 0 mL / NET: 2004.2 mL    12-18 @ 07:01  -  12-18 @ 17:49  --------------------------------------------------------  IN: 445.9 mL / OUT: 0 mL / NET: 445.9 mL          ## P/E:  Gen: lying comfortably in bed in no apparent distress  Mouth: (+) ETT  Lungs: CTA  Heart: RRR  Abd: Soft/+BS  Ext: No edema  Neuro: Less responsive    CENTRAL LINE: [ ] YES [ ] NO  LOCATION:   DATE INSERTED:  REMOVE: [ ] YES [ ] NO      GURWINDER: [ ] YES [ ] NO    DATE INSERTED:  REMOVE:  [ ] YES [ ] NO      A-LINE:  [ ] YES [ ] NO  LOCATION:   DATE INSERTED:  REMOVE:  [ ] YES [ ] NO  EXPLAIN:      CODE STATUS: [x] full code  [ ] DNR  [ ] DNI  [ ] [facility]  Goals of care discussion: [ ] yes

## 2019-12-19 ENCOUNTER — INPATIENT (INPATIENT)
Facility: HOSPITAL | Age: 64
LOS: 25 days | Discharge: HOME | DRG: 64 | End: 2020-01-14
Attending: HOSPITALIST | Admitting: INTERNAL MEDICINE
Payer: MEDICARE

## 2019-12-19 VITALS
DIASTOLIC BLOOD PRESSURE: 72 MMHG | TEMPERATURE: 99 F | SYSTOLIC BLOOD PRESSURE: 168 MMHG | OXYGEN SATURATION: 100 % | RESPIRATION RATE: 25 BRPM | HEART RATE: 74 BPM

## 2019-12-19 VITALS
TEMPERATURE: 100 F | DIASTOLIC BLOOD PRESSURE: 79 MMHG | HEART RATE: 72 BPM | OXYGEN SATURATION: 100 % | SYSTOLIC BLOOD PRESSURE: 167 MMHG | RESPIRATION RATE: 22 BRPM

## 2019-12-19 DIAGNOSIS — Z95.1 PRESENCE OF AORTOCORONARY BYPASS GRAFT: Chronic | ICD-10-CM

## 2019-12-19 DIAGNOSIS — R56.9 UNSPECIFIED CONVULSIONS: ICD-10-CM

## 2019-12-19 DIAGNOSIS — G40.509 EPILEPTIC SEIZURES RELATED TO EXTERNAL CAUSES, NOT INTRACTABLE, WITHOUT STATUS EPILEPTICUS: ICD-10-CM

## 2019-12-19 PROBLEM — E03.9 HYPOTHYROIDISM, UNSPECIFIED: Chronic | Status: ACTIVE | Noted: 2019-12-14

## 2019-12-19 LAB
ANION GAP SERPL CALC-SCNC: 16 MMOL/L — SIGNIFICANT CHANGE UP (ref 5–17)
ANION GAP SERPL CALC-SCNC: 18 MMOL/L — HIGH (ref 5–17)
APTT BLD: 28.7 SEC — SIGNIFICANT CHANGE UP (ref 27.5–36.3)
BASE EXCESS BLDA CALC-SCNC: 3 MMOL/L — HIGH (ref -2–2)
BUN SERPL-MCNC: 64 MG/DL — HIGH (ref 7–23)
BUN SERPL-MCNC: 86 MG/DL — HIGH (ref 7–23)
CALCIUM SERPL-MCNC: 8.2 MG/DL — LOW (ref 8.4–10.5)
CALCIUM SERPL-MCNC: 8.8 MG/DL — SIGNIFICANT CHANGE UP (ref 8.4–10.5)
CHLORIDE SERPL-SCNC: 94 MMOL/L — LOW (ref 96–108)
CHLORIDE SERPL-SCNC: 95 MMOL/L — LOW (ref 96–108)
CO2 BLDA-SCNC: 27 MMOL/L — SIGNIFICANT CHANGE UP (ref 22–30)
CO2 SERPL-SCNC: 23 MMOL/L — SIGNIFICANT CHANGE UP (ref 22–31)
CO2 SERPL-SCNC: 25 MMOL/L — SIGNIFICANT CHANGE UP (ref 22–31)
CREAT SERPL-MCNC: 5.71 MG/DL — HIGH (ref 0.5–1.3)
CREAT SERPL-MCNC: 6.92 MG/DL — HIGH (ref 0.5–1.3)
CULTURE RESULTS: SIGNIFICANT CHANGE UP
CULTURE RESULTS: SIGNIFICANT CHANGE UP
GLUCOSE BLDC GLUCOMTR-MCNC: 168 MG/DL — HIGH (ref 70–99)
GLUCOSE BLDC GLUCOMTR-MCNC: 178 MG/DL — HIGH (ref 70–99)
GLUCOSE BLDC GLUCOMTR-MCNC: 184 MG/DL — HIGH (ref 70–99)
GLUCOSE BLDC GLUCOMTR-MCNC: 192 MG/DL — HIGH (ref 70–99)
GLUCOSE BLDC GLUCOMTR-MCNC: 196 MG/DL — HIGH (ref 70–99)
GLUCOSE BLDC GLUCOMTR-MCNC: 221 MG/DL — HIGH (ref 70–99)
GLUCOSE SERPL-MCNC: 121 MG/DL — HIGH (ref 70–99)
GLUCOSE SERPL-MCNC: 308 MG/DL — HIGH (ref 70–99)
HCO3 BLDA-SCNC: 26 MMOL/L — SIGNIFICANT CHANGE UP (ref 21–29)
HCT VFR BLD CALC: 25.9 % — LOW (ref 39–50)
HCT VFR BLD CALC: 26 % — LOW (ref 39–50)
HGB BLD-MCNC: 8.4 G/DL — LOW (ref 13–17)
HGB BLD-MCNC: 8.4 G/DL — LOW (ref 13–17)
HOROWITZ INDEX BLDA+IHG-RTO: 30 — SIGNIFICANT CHANGE UP
INR BLD: 1.19 RATIO — HIGH (ref 0.88–1.16)
MAGNESIUM SERPL-MCNC: 2.1 MG/DL — SIGNIFICANT CHANGE UP (ref 1.6–2.6)
MAGNESIUM SERPL-MCNC: 2.4 MG/DL — SIGNIFICANT CHANGE UP (ref 1.6–2.6)
MCHC RBC-ENTMCNC: 29.1 PG — SIGNIFICANT CHANGE UP (ref 27–34)
MCHC RBC-ENTMCNC: 29.3 PG — SIGNIFICANT CHANGE UP (ref 27–34)
MCHC RBC-ENTMCNC: 32.3 GM/DL — SIGNIFICANT CHANGE UP (ref 32–36)
MCHC RBC-ENTMCNC: 32.4 GM/DL — SIGNIFICANT CHANGE UP (ref 32–36)
MCV RBC AUTO: 90 FL — SIGNIFICANT CHANGE UP (ref 80–100)
MCV RBC AUTO: 90.2 FL — SIGNIFICANT CHANGE UP (ref 80–100)
NRBC # BLD: 0 /100 WBCS — SIGNIFICANT CHANGE UP (ref 0–0)
NRBC # BLD: 0 /100 WBCS — SIGNIFICANT CHANGE UP (ref 0–0)
PCO2 BLDA: 35 MMHG — SIGNIFICANT CHANGE UP (ref 32–46)
PH BLDA: 7.49 — HIGH (ref 7.35–7.45)
PHENOBARB SERPL-MCNC: 8.5 UG/ML — LOW (ref 15–40)
PHOSPHATE SERPL-MCNC: 3.2 MG/DL — SIGNIFICANT CHANGE UP (ref 2.5–4.5)
PHOSPHATE SERPL-MCNC: 4.1 MG/DL — SIGNIFICANT CHANGE UP (ref 2.5–4.5)
PLATELET # BLD AUTO: 238 K/UL — SIGNIFICANT CHANGE UP (ref 150–400)
PLATELET # BLD AUTO: 244 K/UL — SIGNIFICANT CHANGE UP (ref 150–400)
PO2 BLDA: 176 MMHG — HIGH (ref 74–108)
POTASSIUM SERPL-MCNC: 4 MMOL/L — SIGNIFICANT CHANGE UP (ref 3.5–5.3)
POTASSIUM SERPL-MCNC: 4 MMOL/L — SIGNIFICANT CHANGE UP (ref 3.5–5.3)
POTASSIUM SERPL-SCNC: 4 MMOL/L — SIGNIFICANT CHANGE UP (ref 3.5–5.3)
POTASSIUM SERPL-SCNC: 4 MMOL/L — SIGNIFICANT CHANGE UP (ref 3.5–5.3)
PROTHROM AB SERPL-ACNC: 13.7 SEC — HIGH (ref 10–12.9)
RBC # BLD: 2.87 M/UL — LOW (ref 4.2–5.8)
RBC # BLD: 2.89 M/UL — LOW (ref 4.2–5.8)
RBC # FLD: 17.4 % — HIGH (ref 10.3–14.5)
RBC # FLD: 17.9 % — HIGH (ref 10.3–14.5)
SAO2 % BLDA: 99 % — HIGH (ref 92–96)
SODIUM SERPL-SCNC: 135 MMOL/L — SIGNIFICANT CHANGE UP (ref 135–145)
SODIUM SERPL-SCNC: 136 MMOL/L — SIGNIFICANT CHANGE UP (ref 135–145)
SPECIMEN SOURCE: SIGNIFICANT CHANGE UP
SPECIMEN SOURCE: SIGNIFICANT CHANGE UP
T3FREE SERPL-MCNC: 1.17 PG/ML — LOW (ref 1.8–4.6)
T4 FREE SERPL-MCNC: 0.9 NG/DL — SIGNIFICANT CHANGE UP (ref 0.9–1.8)
TSH SERPL-MCNC: 0.88 UIU/ML — SIGNIFICANT CHANGE UP (ref 0.27–4.2)
WBC # BLD: 12.28 K/UL — HIGH (ref 3.8–10.5)
WBC # BLD: 13.68 K/UL — HIGH (ref 3.8–10.5)
WBC # FLD AUTO: 12.28 K/UL — HIGH (ref 3.8–10.5)
WBC # FLD AUTO: 13.68 K/UL — HIGH (ref 3.8–10.5)

## 2019-12-19 PROCEDURE — 93010 ELECTROCARDIOGRAM REPORT: CPT

## 2019-12-19 PROCEDURE — 70450 CT HEAD/BRAIN W/O DYE: CPT | Mod: 26

## 2019-12-19 PROCEDURE — 71045 X-RAY EXAM CHEST 1 VIEW: CPT | Mod: 26

## 2019-12-19 PROCEDURE — 99292 CRITICAL CARE ADDL 30 MIN: CPT

## 2019-12-19 PROCEDURE — 99291 CRITICAL CARE FIRST HOUR: CPT

## 2019-12-19 PROCEDURE — 95951: CPT | Mod: 26

## 2019-12-19 PROCEDURE — 95816 EEG AWAKE AND DROWSY: CPT | Mod: 26

## 2019-12-19 RX ORDER — CHLORHEXIDINE GLUCONATE 213 G/1000ML
1 SOLUTION TOPICAL
Refills: 0 | Status: DISCONTINUED | OUTPATIENT
Start: 2019-12-19 | End: 2019-12-26

## 2019-12-19 RX ORDER — ATORVASTATIN CALCIUM 80 MG/1
80 TABLET, FILM COATED ORAL AT BEDTIME
Refills: 0 | Status: DISCONTINUED | OUTPATIENT
Start: 2019-12-19 | End: 2019-12-27

## 2019-12-19 RX ORDER — LACOSAMIDE 50 MG/1
50 TABLET ORAL EVERY 12 HOURS
Refills: 0 | Status: DISCONTINUED | OUTPATIENT
Start: 2019-12-19 | End: 2019-12-25

## 2019-12-19 RX ORDER — ASPIRIN/CALCIUM CARB/MAGNESIUM 324 MG
81 TABLET ORAL DAILY
Refills: 0 | Status: DISCONTINUED | OUTPATIENT
Start: 2019-12-19 | End: 2019-12-19

## 2019-12-19 RX ORDER — LEVETIRACETAM 250 MG/1
250 TABLET, FILM COATED ORAL EVERY 12 HOURS
Refills: 0 | Status: DISCONTINUED | OUTPATIENT
Start: 2019-12-19 | End: 2019-12-28

## 2019-12-19 RX ORDER — PHENOBARBITAL 60 MG
100 TABLET ORAL EVERY 8 HOURS
Refills: 0 | Status: DISCONTINUED | OUTPATIENT
Start: 2019-12-19 | End: 2019-12-20

## 2019-12-19 RX ORDER — SENNA PLUS 8.6 MG/1
2 TABLET ORAL AT BEDTIME
Refills: 0 | Status: DISCONTINUED | OUTPATIENT
Start: 2019-12-19 | End: 2019-12-27

## 2019-12-19 RX ORDER — NOREPINEPHRINE BITARTRATE/D5W 8 MG/250ML
0.05 PLASTIC BAG, INJECTION (ML) INTRAVENOUS
Qty: 8 | Refills: 0 | Status: DISCONTINUED | OUTPATIENT
Start: 2019-12-19 | End: 2019-12-19

## 2019-12-19 RX ORDER — PANTOPRAZOLE SODIUM 20 MG/1
40 TABLET, DELAYED RELEASE ORAL DAILY
Refills: 0 | Status: DISCONTINUED | OUTPATIENT
Start: 2019-12-19 | End: 2019-12-22

## 2019-12-19 RX ORDER — CHLORHEXIDINE GLUCONATE 213 G/1000ML
15 SOLUTION TOPICAL EVERY 12 HOURS
Refills: 0 | Status: DISCONTINUED | OUTPATIENT
Start: 2019-12-19 | End: 2019-12-21

## 2019-12-19 RX ORDER — HEPARIN SODIUM 5000 [USP'U]/ML
5000 INJECTION INTRAVENOUS; SUBCUTANEOUS EVERY 12 HOURS
Refills: 0 | Status: DISCONTINUED | OUTPATIENT
Start: 2019-12-19 | End: 2019-12-31

## 2019-12-19 RX ORDER — INSULIN HUMAN 100 [IU]/ML
3 INJECTION, SOLUTION SUBCUTANEOUS
Qty: 100 | Refills: 0 | Status: DISCONTINUED | OUTPATIENT
Start: 2019-12-19 | End: 2019-12-21

## 2019-12-19 RX ORDER — CLOPIDOGREL BISULFATE 75 MG/1
75 TABLET, FILM COATED ORAL
Refills: 0 | Status: DISCONTINUED | OUTPATIENT
Start: 2019-12-19 | End: 2019-12-27

## 2019-12-19 RX ORDER — LEVOTHYROXINE SODIUM 125 MCG
50 TABLET ORAL DAILY
Refills: 0 | Status: DISCONTINUED | OUTPATIENT
Start: 2019-12-19 | End: 2019-12-27

## 2019-12-19 RX ORDER — ASPIRIN/CALCIUM CARB/MAGNESIUM 324 MG
81 TABLET ORAL DAILY
Refills: 0 | Status: DISCONTINUED | OUTPATIENT
Start: 2019-12-19 | End: 2019-12-27

## 2019-12-19 RX ORDER — CALCIUM ACETATE 667 MG
667 TABLET ORAL
Refills: 0 | Status: DISCONTINUED | OUTPATIENT
Start: 2019-12-19 | End: 2019-12-27

## 2019-12-19 RX ADMIN — CHLORHEXIDINE GLUCONATE 1 APPLICATION(S): 213 SOLUTION TOPICAL at 21:11

## 2019-12-19 RX ADMIN — Medication 100 MILLIGRAM(S): at 14:10

## 2019-12-19 RX ADMIN — Medication 81 MILLIGRAM(S): at 12:20

## 2019-12-19 RX ADMIN — Medication 667 MILLIGRAM(S): at 09:30

## 2019-12-19 RX ADMIN — HEPARIN SODIUM 5000 UNIT(S): 5000 INJECTION INTRAVENOUS; SUBCUTANEOUS at 18:08

## 2019-12-19 RX ADMIN — CLOPIDOGREL BISULFATE 75 MILLIGRAM(S): 75 TABLET, FILM COATED ORAL at 18:08

## 2019-12-19 RX ADMIN — ATORVASTATIN CALCIUM 80 MILLIGRAM(S): 80 TABLET, FILM COATED ORAL at 21:11

## 2019-12-19 RX ADMIN — Medication 650 MILLIGRAM(S): at 00:18

## 2019-12-19 RX ADMIN — CHLORHEXIDINE GLUCONATE 15 MILLILITER(S): 213 SOLUTION TOPICAL at 18:08

## 2019-12-19 RX ADMIN — PANTOPRAZOLE SODIUM 40 MILLIGRAM(S): 20 TABLET, DELAYED RELEASE ORAL at 12:20

## 2019-12-19 RX ADMIN — LEVETIRACETAM 400 MILLIGRAM(S): 250 TABLET, FILM COATED ORAL at 05:50

## 2019-12-19 RX ADMIN — INSULIN HUMAN 2 UNIT(S)/HR: 100 INJECTION, SOLUTION SUBCUTANEOUS at 08:32

## 2019-12-19 RX ADMIN — Medication 100 MILLIGRAM(S): at 21:11

## 2019-12-19 RX ADMIN — SENNA PLUS 2 TABLET(S): 8.6 TABLET ORAL at 21:11

## 2019-12-19 RX ADMIN — Medication 667 MILLIGRAM(S): at 12:20

## 2019-12-19 RX ADMIN — LACOSAMIDE 110 MILLIGRAM(S): 50 TABLET ORAL at 18:09

## 2019-12-19 RX ADMIN — Medication 6.5 MICROGRAM(S)/KG/MIN: at 03:30

## 2019-12-19 RX ADMIN — LEVETIRACETAM 400 MILLIGRAM(S): 250 TABLET, FILM COATED ORAL at 18:34

## 2019-12-19 RX ADMIN — Medication 667 MILLIGRAM(S): at 18:10

## 2019-12-19 RX ADMIN — INSULIN HUMAN 3 UNIT(S)/HR: 100 INJECTION, SOLUTION SUBCUTANEOUS at 20:56

## 2019-12-19 NOTE — PROGRESS NOTE ADULT - ASSESSMENT
Summary:     NEURO:  q1h neuro checks    CARDS:  -150    PULM:  sat > 92%    RENAL:  IVL    GASTRO:  advance as tolerated  ---> Stress ulcer prophylaxis:  PPI    HEME:  monitor H/H    ---> DVT prophylaxis: SCDs, hold anticoagulation, fresh    ENDO:  euglycemia    ID:  afebrile    Code status:  Full code  Disposition:  ICU    This patient was at high risk of neurologic deterioration and/or death due to:     Time spent:  45 minutes Summary: 63 yo M with h/o CAD s/p stent 2009 and abnormal cath 9/2018 with 90% pRCA lesion not intervened upon) with placement of drug eluting stent everolimus Synergy  3x 28 mm in June 2019, HTN, DM,  L carotid stenosis, hypothyroidism and CKD stage 5  (M-W-FRI  awaiting renal transplant expected within 3 months presented after unresponsiveness at HD, now s/p tpa and found to be in status epilepticus    NEURO:  status epilepticus in setting of strokes likely hypoperfusion  q1h neuro checks  vEEG  vimpat, Keppra and PHB [id number]  send PHB  off versed  fentanyl prn 25  Bedrest    CARDS:  CAD, hypotensive  SBP<160, MAP>65 with subacute stroke  levo prn  place a line  Echo LV 60-65% (12/15/19)  ASA/plavix  statin  hold coreg, hydral  EKG    PULM:  sat > 92%  mech vent  send ABG  chest xray  SBT daily    RENAL:  CKD stage V awaiting transplant  nephrology consulted  anuric  baseline bladder scan  replete lytes prn    GASTRO:  start TF via NGT  advance as tolerated  ---> Stress ulcer prophylaxis:  PPI  start bowel regimen    HEME:  monitor H/H    ---> DVT prophylaxis: SCDs, start DVT ppx    ENDO:  hypothyroid, DMII  cont synthroid  euglycemia  on insulin gtt, A1c 8.4    ID:  afebrile  mild leukocytosis    Code status:  Full code  Disposition:  ICU    This patient was at high risk of neurologic deterioration and/or death due to:     Time spent:  45 minutes Summary: 65 yo M with h/o CAD s/p stent 2009 and abnormal cath 9/2018 with 90% pRCA lesion not intervened upon) with placement of drug eluting stent everolimus Synergy  3x 28 mm in June 2019, HTN, DM,  L carotid stenosis, hypothyroidism and CKD stage 5  (M-W-FRI  awaiting renal transplant expected within 3 months presented after unresponsiveness at HD, now s/p tpa and found to be in status epilepticus    NEURO:  status epilepticus in setting of strokes likely hypoperfusion  q1h neuro checks  vEEG  vimpat, Keppra and PHB [id number]  send PHB  off versed  fentanyl prn 25  Bedrest    CARDS:  CAD, hypotensive  SBP<160, MAP>65 with subacute stroke  levo prn  consider a line  Echo LV 60-65% (12/15/19)  ASA/plavix for stents  statin  hold coreg, hydral  EKG    PULM:  sat > 92%  mec vent  send ABG  chest xray  SBT daily    RENAL:  CKD stage V awaiting transplant  nephrology consulted- HD MWF  anuric  baseline bladder scan  replete lytes prn    GASTRO:  start TF via NGT  advance as tolerated  ---> Stress ulcer prophylaxis:  PPI  start bowel regimen    HEME:  monitor H/H    ---> DVT prophylaxis: SCDs, start DVT ppx    ENDO:  hypothyroid, DMII  cont synthroid  euglycemia  on insulin gtt, A1c 8.4    ID:  afebrile  mild leukocytosis    Code status:  Full code  Disposition:  ICU    This patient was at high risk of neurologic deterioration and/or death due to:     Time spent:  45 minutes

## 2019-12-19 NOTE — CONSULT NOTE ADULT - SUBJECTIVE AND OBJECTIVE BOX
[facility] [place] [facility]   MD Yokasta Gary D.O. Fatima Sheikh, D.O. Ruoru Wong, PA    From 7 AM - 5 PM:  OFFICE: [phone]  Dr. Barrientos cell: [phone]  Dr. Lopes cell: [phone]  Dr. Witt cell: [phone]  WALDO Valentin cell: [phone]    From 5 PM - 7 AM: Answering Service: [phone]      -- INITIAL RENAL CONSULT NOTE  --------------------------------------------------------------------------------  HPI: 65 yo male with ESRD on HD (M-W-FRI without AVF as awaiting renal transplant expected within 3 months), CAD s/p stent 2009 and CARLA in June 2019, HTN, DM type 2, hypothyroidism and left carotid stenosis admitted for altered mental status. Pt known to Dr. Barrientos and receives his HD at [place] [facility]. Last HD yesterday at [facility] prior to transfer. Per records, pt initially admitted because during dialysis, he became unresponsive and arm not moving therefore he was sent to hospital. At [facility], he received tPA on 12/13/19. Was noted to have SAH after tPA. Started on 3% saline. Had seizure, despite multiple AEDs remained in status epilepticus and was transferred to [facility] for continuous EEG monitoring. Pt seen and examined. Non-responsive at present.       PAST HISTORY  --------------------------------------------------------------------------------  PAST MEDICAL & SURGICAL HISTORY:  Hypothyroidism  CRI (Chronic Renal Insufficiency)  CAD (Coronary Artery Disease): with Stents in 06/2009 and 6/2019  Dyslipidemia  Diabetes  Hypertension  History of insertion of stent into coronary artery bypass graft    FAMILY HISTORY:  FH: HTN (hypertension): Father    PAST SOCIAL HISTORY:    ALLERGIES & MEDICATIONS  --------------------------------------------------------------------------------  Allergies    No Known Allergies    Intolerances      Standing Inpatient Medications  atorvastatin 80 milliGRAM(s) Oral at bedtime  calcium acetate 667 milliGRAM(s) Oral three times a day with meals  chlorhexidine 0.12% Liquid 15 milliLiter(s) Oral Mucosa every 12 hours  insulin regular Infusion 3 Unit(s)/Hr IV Continuous <Continuous>  lacosamide IVPB 50 milliGRAM(s) IV Intermittent every 12 hours  levETIRAcetam  IVPB 250 milliGRAM(s) IV Intermittent every 12 hours  pantoprazole  Injectable 40 milliGRAM(s) IV Push daily  PHENobarbital Injectable 100 milliGRAM(s) IV Push every 8 hours    PRN Inpatient Medications      REVIEW OF SYSTEMS  --------------------------------------------------------------------------------  Unable to obtain    VITALS/PHYSICAL EXAM  --------------------------------------------------------------------------------  T(C): 37.2 (12-19-19 @ 07:00), Max: 38.3 (12-19-19 @ 00:30)  HR: 70 (12-19-19 @ 09:00) (61 - 75)  BP: 103/49 (12-19-19 @ 09:00) (83/42 - 209/72)  RR: 17 (12-19-19 @ 09:15) (14 - 26)  SpO2: 100% (12-19-19 @ 09:15) (100% - 100%)  Wt(kg): --        12-19-19 @ 07:01  -  12-19-19 @ 09:18  --------------------------------------------------------  IN: 6 mL / OUT: 0 mL / NET: 6 mL      Physical Exam:  	Gen: NAD, intubated, unresponsive  	HEENT: MMM  	Pulm: CTA B/L  	CV: S1S2  	Abd: Soft, +BS   	Ext: No LE edema B/L  	Neuro: non-responsive, not on sedation  	Skin: Warm and dry  	Vascular access: RIJ THDC in place, dressing c/d/i    LABS/STUDIES  --------------------------------------------------------------------------------              8.4    13.68 >-----------<  244      [12-19-19 @ 07:55]              25.9     136  |  95  |  64  ----------------------------<  308      [12-19-19 @ 07:55]  4.0   |  25  |  5.71        Ca     8.2     [12-19-19 @ 07:55]      Mg     2.1     [12-19-19 @ 07:55]      Phos  3.2     [12-19-19 @ 07:55]    TPro  7.2  /  Alb  2.3  /  TBili  0.3  /  DBili  x   /  AST  26  /  ALT  16  /  AlkPhos  79  [12-18-19 @ 03:52]    PT/INR: PT 13.7 , INR 1.19       [12-19-19 @ 07:55]  PTT: 28.7       [12-19-19 @ 07:55]      Creatinine Trend:  SCr 5.71 [12-19 @ 07:55]  SCr 7.43 [12-18 @ 03:52]  SCr 5.53 [12-17 @ 03:07]  SCr 6.73 [12-16 @ 04:27]  SCr 5.70 [12-15 @ 15:59]    Urinalysis - [12-14-19 @ 01:09]      Color Yellow / Appearance Clear / SG 1.005 / pH 7.0      Gluc 50 / Ketone Negative  / Bili Negative / Urobili Negative       Blood Trace / Protein 500 / Leuk Est Negative / Nitrite Negative      RBC 3-5 / WBC 0-2 / Hyaline  / Gran  / Sq Epi  / Non Sq Epi Occasional / Bacteria Few      Iron 26, TIBC 205, %sat 13      [06-04-19 @ 09:27]  Ferritin 99      [06-04-19 @ 09:33]  PTH -- (Ca 6.9)      [06-05-19 @ 08:14]   562  HbA1c 8.4      [12-15-19 @ 11:00]  TSH 1.160      [12-14-19 @ 06:30]  Lipid: chol 134, , HDL 56, LDL 52      [12-15-19 @ 10:56]    HBsAb >1000.0      [12-14-19 @ 23:48]  HBsAb Reactive      [03-13-17 @ 19:00]  HBsAg Nonreact      [12-14-19 @ 23:48]  HBcAb Reactive      [06-03-19 @ 15:57]  HCV 0.15, Nonreact      [12-14-19 @ 23:48]  HIV Nonreact      [03-13-17 @ 19:00]

## 2019-12-19 NOTE — H&P ADULT - NSHPPHYSICALEXAM_GEN_ALL_CORE
General:  nad, intubated  Neuro: does not open eyes, does not follow commands, pupils 3mm and reactive b/l, grimaces to noxious stimuli, no movement in extremities x4  Heart: +s1, s2, regular rate and rhythm  Lungs:  clear to auscultation b/l  Abdomen:  soft, non-distended, non-tender

## 2019-12-19 NOTE — CHART NOTE - NSCHARTNOTEFT_GEN_A_CORE
CAPRINI SCORE [CLOT] Score on Admission for     AGE RELATED RISK FACTORS                                                          MOBILITY RELATED FACTORS  [ ] Age 41-60 years                                            (1 Point)                  [x] Bed rest                                                        (1 Point)  [x] Age: 61-74 years                                           (2 Points)                [ ] Plaster cast                                                   (2 Points)  [ ] Age= 75 years                                              (3 Points)                  [ ] Bed bound for more than 72 hours                 (2 Points)    DISEASE RELATED RISK FACTORS                                                   GENDER SPECIFIC FACTORS  [ ] Edema in the lower extremities                       (1 Point)            [ ] Pregnancy                                                     (1 Point)  [ ] Varicose veins                                               (1 Point)                   [ ] Post-partum < 6 weeks                                   (1 Point)             [x] BMI > 25 Kg/m2                                            (1 Point)                   [ ] Hormonal therapy  or oral contraception          (1 Point)                 [ ] Sepsis (in the previous month)                        (1 Point)             [ ] History of pregnancy complications                 (1 point)  [ ] Pneumonia or serious lung disease                                             [ ] Unexplained or recurrent                     (1 Point)           (in the previous month)                               (1 Point)  [ ] Abnormal pulmonary function test                     (1 Point)         SURGERY RELATED RISK FACTORS (include planned surgeries)  [ ] Acute myocardial infarction                              (1 Point)               [ ]  Section                                             (1 Point)  [ ] Congestive heart failure (in the previous month)  (1 Point)      [ ] Minor surgery                                                  (1 Point)   [ ] Inflammatory bowel disease                             (1 Point)               [ ] Arthroscopic surgery                                        (2 Points)  [ ] Central venous access                                      (2 Points)                [ ] General surgery lasting more than 45 minutes   (2 Points)       [x] Stroke (in the previous month)                          (5 Points)            [ ] Elective arthroplasty                                         (5 Points)            [ ] current or past malignancy                              (2 Points)                                                                                                       HEMATOLOGY RELATED FACTORS                                                    TRAUMA RELATED RISK FACTORS  [ ] Prior episodes of VTE                                     (3 Points)                [ ] Fracture of the hip, pelvis, or leg                       (5 Points)  [ ] Positive family history for VTE                         (3 Points)             [ ] Acute spinal cord injury (in the previous month)  (5 Points)  [ ] Prothrombin 91031 A                                     (3 Points)                [ ] Paralysis  (less than 1 month)                             (5 Points)  [ ] Factor V Leiden                                             (3 Points)                   [ ] Multiple Trauma within 1 month                        (5 Points)  [ ] Lupus anticoagulants                                     (3 Points)                                                           [ ] Anticardiolipin antibodies                               (3 Points)                                                       [ ] High homocysteine in the blood                      (3 Points)                                             [ ] Other congenital or acquired thrombophilia      (3 Points)                                                [ ] Heparin induced thrombocytopenia                  (3 Points)                                          Total Score [     9     ]    Risk:  Very low 0   Low 1 to 2   Moderate 3 to 4   High =5       VTE Prophylaxis Recommendations:  [x] mechanical pneumatic compression devices                                      [ ] contraindicated: _____________________  [x] chemo prophylaxis                                                                                   [ ] contraindicated _____________________    **** HIGH LIKELIHOOD DVT PRESENT ON ADMISSION  [x] (please order LE dopplers within 24 hours of admission)

## 2019-12-19 NOTE — CHART NOTE - NSCHARTNOTEFT_GEN_A_CORE
Patient BP was trending down and propofol drip was stopped.   When the systolic BP was below 100, levophed drip was started.  Titrated for MAP in 70's.   Dr. Pollard was updated via transfer center.   Ambulance came to  the patient and they couldn't take the tube feeds. Insulin drip was stopped.   They were instructed to get a fingerstick on arrival to the MICU.     Babatunde Castelan ( son) was informed about the transfer.

## 2019-12-19 NOTE — H&P ADULT - NSICDXPASTSURGICALHX_GEN_ALL_CORE_FT
PAST SURGICAL HISTORY:  History of insertion of stent into coronary artery bypass graft 2009 and 2019

## 2019-12-19 NOTE — EEG REPORT - NS EEG TEXT BOX
[facility]   COMPREHENSIVE EPILEPSY CENTER   REPORT OF ROUTINE EEG W/ Video     [facility]: [address], [place] [address], Ph#: [phone]  [facility]: [phone], [place] [address], Ph#: [phone]  [facility]: [address] [place] [address], Ph#: [phone]    Patient Name: CHAD DUNBAR  Age and : 64y ([id number])  MRN #: [id number]  Location: [place] [address]  Referring Physician: Maris Pollard    Study Date: 19    _____________________________________________________________  TECHNICAL INFORMATION    Placement and Labeling of Electrodes:  The EEG was performed utilizing 20 channels referential EEG connections (coronal over temporal over parasagittal montage) using all standard 10-20 electrode placements with EKG.  Recording was at a sampling rate of 256 samples per second per channel.  Time synchronized digital video recording was done simultaneously with EEG recording.  A low light infrared camera was used for low light recording.  Ramon and seizure detection algorithms were utilized.    _____________________________________________________________  HISTORY    Patient is a 64y old  Male who presents with a chief complaint of status epilepticus (19 Dec 2019 07:55)      PERTINENT MEDICATION:  lacosamide IVPB 50 milliGRAM(s) IV Intermittent every 12 hours  levETIRAcetam  IVPB 250 milliGRAM(s) IV Intermittent every 12 hours  PHENobarbital Injectable 100 milliGRAM(s) IV Push every 8 hours  _____________________________________________________________  STUDY INTERPRETATION    Findings: The background was continuous, spontaneously variable and minimally reactive. No posterior dominant rhythm seen.    Background Slowing:  Diffuse theta and polymorphic delta slowing.    Focal Slowing:   Continuous polymorphic delta slowing in the left parasagital region.     Sleep Background:  Stage II sleep transients were not recorded.    Other Non-Epileptiform Findings:  None were present.    Interictal Epileptiform Activity:   Continuous asynchronous sharp wave discharges over the left occipital and left frontocentral region.    Events:  Clinical events: None recorded.  Seizures: None recorded.    Activation Procedures:   Hyperventilation was not performed.    Photic stimulation was not performed.       Artifacts:  Intermittent myogenic and movement artifacts were noted.    ECG:  The heart rate on single channel ECG was predominantly between 50-65 BPM.    _____________________________________________________________  EEG SUMMARY/CLASSIFICATION    Abnormal EEG in an altered  patient.  - Continuous asynchronous sharp wave discharges over the left occipital and left frontocentral region.  - Continuous polymorphic delta slowing in the left parasagittal region.   - Moderate to severe generalized slowing.    _____________________________________________________________  EEG IMPRESSION/CLINICAL CORRELATE    Abnormal EEG study.  1. Potential epileptogenic focus in the left occipital and left frontocentral region.  2. Structural abnormality in the left parasagittal region.  3. Moderate to severe nonspecific diffuse or multifocal cerebral dysfunction.   4. No seizure seen.    Preliminary Fellow Report  _____________________________________________________________    Trena Leonardo MD  Epilepsy Fellow    , MD  Attending Physician, [facility] [place] [facility]   COMPREHENSIVE EPILEPSY CENTER   REPORT OF ROUTINE EEG W/ Video     [facility]: [address], [place] [address], Ph#: [phone]  [facility]: [phone], [place] [address], Ph#: [phone]  [facility]: [address] [place] [address], Ph#: [phone]    Patient Name: CHAD DUNBAR  Age and : 64y ([id number])  MRN #: [id number]  Location: [place] [address]  Referring Physician: Maris Pollard    Study Date: 19    _____________________________________________________________  TECHNICAL INFORMATION    Placement and Labeling of Electrodes:  The EEG was performed utilizing 20 channels referential EEG connections (coronal over temporal over parasagittal montage) using all standard 10-20 electrode placements with EKG.  Recording was at a sampling rate of 256 samples per second per channel.  Time synchronized digital video recording was done simultaneously with EEG recording.  A low light infrared camera was used for low light recording.  Ramon and seizure detection algorithms were utilized.    _____________________________________________________________  HISTORY    Patient is a 64y old  Male who presents with a chief complaint of status epilepticus (19 Dec 2019 07:55)      PERTINENT MEDICATION:  lacosamide IVPB 50 milliGRAM(s) IV Intermittent every 12 hours  levETIRAcetam  IVPB 250 milliGRAM(s) IV Intermittent every 12 hours  PHENobarbital Injectable 100 milliGRAM(s) IV Push every 8 hours  _____________________________________________________________  STUDY INTERPRETATION    Findings: The background was continuous, spontaneously variable and minimally reactive. No posterior dominant rhythm seen.    Background Slowing:  Diffuse theta and polymorphic delta slowing.    Focal Slowing:   Continuous polymorphic delta slowing in the left parasagital region.     Sleep Background:  Stage II sleep transients were not recorded.    Other Non-Epileptiform Findings:  None were present.    Interictal Epileptiform Activity:   Continuous asynchronous sharp wave discharges LPDs over the left occipital near 1-1.5hz and left frontocentral region 1hz.    Events:  Clinical events: None recorded.  Seizures: None recorded.    Activation Procedures:   Hyperventilation was not performed.    Photic stimulation was not performed.       Artifacts:  Intermittent myogenic and movement artifacts were noted.    ECG:  The heart rate on single channel ECG was predominantly between 50-65 BPM.    _____________________________________________________________  EEG SUMMARY/CLASSIFICATION    Abnormal EEG in an altered  patient.  - Continuous asynchronous LPDs over the left occipital and independently over the left frontocentral region.  - Continuous polymorphic delta slowing in the left parasagittal region.   - Moderate to severe generalized slowing.    _____________________________________________________________  EEG IMPRESSION/CLINICAL CORRELATE    Abnormal EEG study.  1. Independent epileptogenic foci in the left occipital and left frontocentral region, high risk for focal onset seizures.  2. Structural abnormality in the left parasagittal region.  3. Moderate to severe nonspecific diffuse or multifocal cerebral dysfunction.   4. No seizure seen.    _____________________________________________________________    Trena Leonardo MD  Epilepsy Fellow

## 2019-12-19 NOTE — CONSULT NOTE ADULT - ASSESSMENT
65 yo male with ESRD on HD (M-W-FRI without AVF as awaiting renal transplant expected within 3 months; Dr. Barrientos; [facility]), CAD s/p stent 2009 and CARLA in June 2019, HTN, DM type 2, hypothyroidism and left carotid stenosis admitted for altered mental status during dialysis, he became unresponsive and arm not moving therefore he was sent to hospital. At [facility], he received tPA on 12/13/19. Was noted to have SAH after tPA. Started on 3% saline. Had seizure, despite multiple AEDs remained in status epilepticus and was transferred to [facility] for continuous EEG monitoring. Nephrology is following for ESRD management.    ESRD on HD MWF  - had HD yesterday in [facility] prior to transfer  - unable to consent pt for HD given AMS  - no indication for RRT at this time    - please dose Keppra and phenobarbital post HD on HD days     Anemia  - Hgb below goal; unable to use epogen due to CVA  - transfuse PRN Hgb <7     HTN  - management per NSICU team  - Will avoid hypotension w/ HD

## 2019-12-19 NOTE — CHART NOTE - NSCHARTNOTEFT_GEN_A_CORE
Called pt's son/emergency contact, Chris Castelan ([phone]) regarding HD consent while pt is in [facility]. He is not currently in the hospital but is providing verbal consent to continue HD while pt is admitted to [facility]. He states he will try to stop by this morning/early afternoon to provide written consent and visit his father. Discussed w/ NSICU team. Plan for HD MWF unless indicated for electrolyte derangements or volume overload (both were WNL this AM). Please call answering service if in need of emergent HD ([phone])

## 2019-12-19 NOTE — H&P ADULT - HISTORY OF PRESENT ILLNESS
Patient is a 64 year old male with a history of CAD s/p stents 2009 and 2019, HTN, DM, Carotid stenosis, Hypothyroidism, and ESRD on HD who presented to outside hospital with altered mental status as he was unresponsive, aphasic, and not moving any extremities.  Was treated with tPA and admitted for observation.  Patient noted to have seizure during hospital course and was started on Keppra.  MRI brain was done which showed bilateral supratentorial and infratentorial infarcts.  Patient was undergoing dialysis when he developed A. fib and was intubated for airway protection.  Neurology following and multiple EEGs were done which showed patient in status epilepticus despite multiple antiepilectics.  Patient now transferred to [facility] for further management.

## 2019-12-19 NOTE — PROGRESS NOTE ADULT - SUBJECTIVE AND OBJECTIVE BOX
HPI:    On admission, the patient was:  GCS:  Lomeli-Treviño:  modified Ryan:  ICH score:  NIHSS:    *** HIGH RISK OF DVT PRESENT ON ADMISSION ***    VITALS:  T(C): , Max: 38.3 (12-19-19 @ 00:30)  HR:  (61 - 77)  BP:  (83/42 - 209/72)  ABP: --  RR:  (14 - 26)  SpO2:  (100% - 100%)  Wt(kg): --      LABS:  Na: 144 (12-18 @ 03:52), 143 (12-17 @ 03:07)  K: 4.1 (12-18 @ 03:52), 4.0 (12-17 @ 03:07)  Cl: 110 (12-18 @ 03:52), 109 (12-17 @ 03:07)  CO2: 24 (12-18 @ 03:52), 27 (12-17 @ 03:07)  BUN: 81 (12-18 @ 03:52), 51 (12-17 @ 03:07)  Cr: 7.43 (12-18 @ 03:52), 5.53 (12-17 @ 03:07)  Glu: 172(12-18 @ 03:52), 293(12-17 @ 03:07)    Hgb: 9.1 (12-18 @ 03:52), 8.6 (12-17 @ 03:07)  Hct: 29.4 (12-18 @ 03:52), 27.3 (12-17 @ 03:07)  WBC: 12.27 (12-18 @ 03:52), 11.50 (12-17 @ 03:07)  Plt: 232 (12-18 @ 03:52), 219 (12-17 @ 03:07)      IMAGING:   Recent imaging studies were reviewed.    MEDICATIONS:  atorvastatin 80 milliGRAM(s) Oral at bedtime  calcium acetate 667 milliGRAM(s) Oral three times a day with meals  chlorhexidine 0.12% Liquid 15 milliLiter(s) Oral Mucosa every 12 hours  insulin regular Infusion 2 Unit(s)/Hr IV Continuous <Continuous>  lacosamide IVPB 50 milliGRAM(s) IV Intermittent every 12 hours  levETIRAcetam  IVPB 250 milliGRAM(s) IV Intermittent every 12 hours  pantoprazole  Injectable 40 milliGRAM(s) IV Push daily  PHENobarbital Injectable 100 milliGRAM(s) IV Push every 8 hours    EXAMINATION:  General:  calm  HEENT:  MMM  Neuro:  awake, alert, oriented x 3, follows commands, moves all extremities  Cards:  RRR  Respiratory:  no respiratory distress  Adomen:  soft  Extremities:  no edema  Skin:  warm/dry SUMMARY:  From notes,   "63 yo M with h/o CAD s/p stent 2009 and abnormal cath 9/2018 with 90% pRCA lesion (not intervened upon) with placement of drug eluting stent everolimus Synergy  3x 28 mm in June 2019, HTN, DM,  L carotid stenosis, hypothyroidism and CKD stage 5  (M-W-FRI  awaiting renal transplant expected within 3 months. Pt presented  to the ER for altered mental status. EMS contacted by wife when pt noted unresponsive, aphasic and not moving extremities; pt Rx'd with TPA. 12/14 pt had CT neck/head; read as SAH. Early am 12/15 pt with Sz and started on Keppra.12/15 pt had MR head: Numerous acute diffuse bilateral supratentorial and infratentorial infarcts. CT/MR head reviewed with radiology. Pt did not have SAH it was contrast from the studies. Poor MS 2 to multiple cerebral infarcts. During HD pt went into A fib which may be the possible cause of the strokes. s/p intubation 12/16 for airway protection"    Per signout, patient was in NCSE whenever stat EEG was checked at OSH (x 3). He was transferred to [facility] for continuous EEG monitoring. Patient was noted to be Keppra 250 iv q12    On admission, the patient was:  GCS:    *** HIGH RISK OF DVT PRESENT ON ADMISSION ***    VITALS:  T(C): , Max: 38.3 (12-19-19 @ 00:30)  HR:  (61 - 77)  BP:  (83/42 - 209/72)  ABP: --  RR:  (14 - 26)  SpO2:  (100% - 100%)  Wt(kg): --      LABS:  Na: 144 (12-18 @ 03:52), 143 (12-17 @ 03:07)  K: 4.1 (12-18 @ 03:52), 4.0 (12-17 @ 03:07)  Cl: 110 (12-18 @ 03:52), 109 (12-17 @ 03:07)  CO2: 24 (12-18 @ 03:52), 27 (12-17 @ 03:07)  BUN: 81 (12-18 @ 03:52), 51 (12-17 @ 03:07)  Cr: 7.43 (12-18 @ 03:52), 5.53 (12-17 @ 03:07)  Glu: 172(12-18 @ 03:52), 293(12-17 @ 03:07)    Hgb: 9.1 (12-18 @ 03:52), 8.6 (12-17 @ 03:07)  Hct: 29.4 (12-18 @ 03:52), 27.3 (12-17 @ 03:07)  WBC: 12.27 (12-18 @ 03:52), 11.50 (12-17 @ 03:07)  Plt: 232 (12-18 @ 03:52), 219 (12-17 @ 03:07)      IMAGING:   Recent imaging studies were reviewed.    MEDICATIONS:  atorvastatin 80 milliGRAM(s) Oral at bedtime  calcium acetate 667 milliGRAM(s) Oral three times a day with meals  chlorhexidine 0.12% Liquid 15 milliLiter(s) Oral Mucosa every 12 hours  insulin regular Infusion 2 Unit(s)/Hr IV Continuous <Continuous>  lacosamide IVPB 50 milliGRAM(s) IV Intermittent every 12 hours  levETIRAcetam  IVPB 250 milliGRAM(s) IV Intermittent every 12 hours  pantoprazole  Injectable 40 milliGRAM(s) IV Push daily  PHENobarbital Injectable 100 milliGRAM(s) IV Push every 8 hours    EXAMINATION:  General:  calm  HEENT:  MMM  Neuro:  awake, alert, oriented x 3, follows commands, moves all extremities  Cards:  RRR  Respiratory:  no respiratory distress  Adomen:  soft  Extremities:  no edema  Skin:  warm/dry SUMMARY:  From notes,   "63 yo M with h/o CAD s/p stent 2009 and abnormal cath 9/2018 with 90% pRCA lesion (not intervened upon) with placement of drug eluting stent everolimus Synergy  3x 28 mm in June 2019, HTN, DM,  L carotid stenosis, hypothyroidism and CKD stage 5  (M-W-FRI  awaiting renal transplant expected within 3 months. Pt presented  to the ER for altered mental status. EMS contacted by wife when pt noted unresponsive, aphasic and not moving extremities; pt Rx'd with TPA. 12/14 pt had CT neck/head; read as SAH. Early am 12/15 pt with Sz and started on Keppra.12/15 pt had MR head: Numerous acute diffuse bilateral supratentorial and infratentorial infarcts. CT/MR head reviewed with radiology. Pt did not have SAH it was contrast from the studies. Poor MS 2 to multiple cerebral infarcts. During HD pt went into A fib which may be the possible cause of the strokes. s/p intubation 12/16 for airway protection"    *Unresponsive during dialysis 12/13 then to ED. SBPs 240s dropped to 140s on cardene in ED.     Per signout, patient was in NCSE whenever stat EEG was checked at OSH (x 3). He was transferred to [facility] for continuous EEG monitoring. Patient was noted to be Keppra 250 iv q12    *** HIGH RISK OF DVT PRESENT ON ADMISSION ***    VITALS:  T(C): , Max: 38.3 (12-19-19 @ 00:30)  HR:  (61 - 77)  BP:  (83/42 - 209/72)  ABP: --  RR:  (14 - 26)  SpO2:  (100% - 100%)  Wt(kg): --      LABS:  Na: 144 (12-18 @ 03:52), 143 (12-17 @ 03:07)  K: 4.1 (12-18 @ 03:52), 4.0 (12-17 @ 03:07)  Cl: 110 (12-18 @ 03:52), 109 (12-17 @ 03:07)  CO2: 24 (12-18 @ 03:52), 27 (12-17 @ 03:07)  BUN: 81 (12-18 @ 03:52), 51 (12-17 @ 03:07)  Cr: 7.43 (12-18 @ 03:52), 5.53 (12-17 @ 03:07)  Glu: 172(12-18 @ 03:52), 293(12-17 @ 03:07)    Hgb: 9.1 (12-18 @ 03:52), 8.6 (12-17 @ 03:07)  Hct: 29.4 (12-18 @ 03:52), 27.3 (12-17 @ 03:07)  WBC: 12.27 (12-18 @ 03:52), 11.50 (12-17 @ 03:07)  Plt: 232 (12-18 @ 03:52), 219 (12-17 @ 03:07)      IMAGING:   Recent imaging studies were reviewed.    MEDICATIONS:  atorvastatin 80 milliGRAM(s) Oral at bedtime  calcium acetate 667 milliGRAM(s) Oral three times a day with meals  chlorhexidine 0.12% Liquid 15 milliLiter(s) Oral Mucosa every 12 hours  insulin regular Infusion 2 Unit(s)/Hr IV Continuous <Continuous>  lacosamide IVPB 50 milliGRAM(s) IV Intermittent every 12 hours  levETIRAcetam  IVPB 250 milliGRAM(s) IV Intermittent every 12 hours  pantoprazole  Injectable 40 milliGRAM(s) IV Push daily  PHENobarbital Injectable 100 milliGRAM(s) IV Push every 8 hours    EXAMINATION:  General:  calm, intubated  HEENT:  MMM  Neuro:  No EO, PERRL 2mm, VOR intact,   +cough/gag/corneals  grimace to nox stim  No movement in ext  Cards:  RRR  Respiratory:  no respiratory distress  Adomen:  soft  Extremities:  no edema  Skin:  warm/dry

## 2019-12-19 NOTE — PROGRESS NOTE ADULT - ASSESSMENT
subacute stroke  seizures--reported suspicion for status epilepticus at OSH    stroke core measures  MRI  off propofol/versed gtt  cont lacosamide, levetiracetam, and phenobarb  check phenobarb level before next dose   cont vEEG monitoring  HD tomorrow  cont insulin gtt

## 2019-12-19 NOTE — PROGRESS NOTE ADULT - SUBJECTIVE AND OBJECTIVE BOX
EEG showing epileptogenic foci, but no seizures    off propofol/versed  Vitals/labs/meds/imaging reviewed  no EO to noxious, grimaces, pupils 2mm reactive b/l, no movements to noxious x4, but saw L foot spontaneously move

## 2019-12-19 NOTE — H&P ADULT - ASSESSMENT
A/P:  Patient is a 64 year old male that presented to outside hospital with altered mental status and treated with tPA, now transferred to [facility] with status epilepticus.  -vimpat, keppra, and phenobarb for seizures  -continuous video EEG  -aspirin/plavix for CAD  -keep MAP > 65  -heparin and SCDs for DVT prophylaxis  -lower extremity duplex to rule out DVT on admission  -renal following, HD as scheduled for ESRD  -home medications  -npo w/ tube feeds (nepro @ 65) via ng tube  -daily labs    Spectra #[id number]

## 2019-12-20 LAB
ANION GAP SERPL CALC-SCNC: 15 MMOL/L — SIGNIFICANT CHANGE UP (ref 5–17)
BUN SERPL-MCNC: 46 MG/DL — HIGH (ref 7–23)
CALCIUM SERPL-MCNC: 8.7 MG/DL — SIGNIFICANT CHANGE UP (ref 8.4–10.5)
CHLORIDE SERPL-SCNC: 93 MMOL/L — LOW (ref 96–108)
CO2 SERPL-SCNC: 25 MMOL/L — SIGNIFICANT CHANGE UP (ref 22–31)
CREAT SERPL-MCNC: 4.03 MG/DL — HIGH (ref 0.5–1.3)
GLUCOSE SERPL-MCNC: 304 MG/DL — HIGH (ref 70–99)
HCT VFR BLD CALC: 27.9 % — LOW (ref 39–50)
HGB BLD-MCNC: 8.8 G/DL — LOW (ref 13–17)
MAGNESIUM SERPL-MCNC: 2.1 MG/DL — SIGNIFICANT CHANGE UP (ref 1.6–2.6)
MCHC RBC-ENTMCNC: 28.3 PG — SIGNIFICANT CHANGE UP (ref 27–34)
MCHC RBC-ENTMCNC: 31.5 GM/DL — LOW (ref 32–36)
MCV RBC AUTO: 89.7 FL — SIGNIFICANT CHANGE UP (ref 80–100)
NRBC # BLD: 0 /100 WBCS — SIGNIFICANT CHANGE UP (ref 0–0)
PHENOBARB SERPL-MCNC: 5.4 UG/ML — LOW (ref 15–40)
PHOSPHATE SERPL-MCNC: 3.4 MG/DL — SIGNIFICANT CHANGE UP (ref 2.5–4.5)
PLATELET # BLD AUTO: 285 K/UL — SIGNIFICANT CHANGE UP (ref 150–400)
POTASSIUM SERPL-MCNC: 4.4 MMOL/L — SIGNIFICANT CHANGE UP (ref 3.5–5.3)
POTASSIUM SERPL-SCNC: 4.4 MMOL/L — SIGNIFICANT CHANGE UP (ref 3.5–5.3)
RBC # BLD: 3.11 M/UL — LOW (ref 4.2–5.8)
RBC # FLD: 16.8 % — HIGH (ref 10.3–14.5)
SODIUM SERPL-SCNC: 133 MMOL/L — LOW (ref 135–145)
WBC # BLD: 11.43 K/UL — HIGH (ref 3.8–10.5)
WBC # FLD AUTO: 11.43 K/UL — HIGH (ref 3.8–10.5)

## 2019-12-20 PROCEDURE — 93970 EXTREMITY STUDY: CPT | Mod: 26

## 2019-12-20 PROCEDURE — 71045 X-RAY EXAM CHEST 1 VIEW: CPT | Mod: 26

## 2019-12-20 PROCEDURE — 99291 CRITICAL CARE FIRST HOUR: CPT

## 2019-12-20 PROCEDURE — 99292 CRITICAL CARE ADDL 30 MIN: CPT

## 2019-12-20 PROCEDURE — 95951: CPT | Mod: 26

## 2019-12-20 RX ORDER — OXYCODONE HYDROCHLORIDE 5 MG/1
10 TABLET ORAL EVERY 4 HOURS
Refills: 0 | Status: DISCONTINUED | OUTPATIENT
Start: 2019-12-20 | End: 2019-12-22

## 2019-12-20 RX ORDER — FENTANYL CITRATE 50 UG/ML
25 INJECTION INTRAVENOUS ONCE
Refills: 0 | Status: DISCONTINUED | OUTPATIENT
Start: 2019-12-20 | End: 2019-12-20

## 2019-12-20 RX ORDER — DEXAMETHASONE 0.5 MG/5ML
4 ELIXIR ORAL EVERY 6 HOURS
Refills: 0 | Status: COMPLETED | OUTPATIENT
Start: 2019-12-20 | End: 2019-12-21

## 2019-12-20 RX ORDER — OXYCODONE HYDROCHLORIDE 5 MG/1
5 TABLET ORAL EVERY 4 HOURS
Refills: 0 | Status: DISCONTINUED | OUTPATIENT
Start: 2019-12-20 | End: 2019-12-22

## 2019-12-20 RX ORDER — LEVETIRACETAM 250 MG/1
125 TABLET, FILM COATED ORAL DAILY
Refills: 0 | Status: DISCONTINUED | OUTPATIENT
Start: 2019-12-20 | End: 2019-12-27

## 2019-12-20 RX ORDER — CARVEDILOL PHOSPHATE 80 MG/1
3.12 CAPSULE, EXTENDED RELEASE ORAL EVERY 12 HOURS
Refills: 0 | Status: DISCONTINUED | OUTPATIENT
Start: 2019-12-20 | End: 2019-12-20

## 2019-12-20 RX ORDER — CARVEDILOL PHOSPHATE 80 MG/1
3.12 CAPSULE, EXTENDED RELEASE ORAL EVERY 12 HOURS
Refills: 0 | Status: DISCONTINUED | OUTPATIENT
Start: 2019-12-20 | End: 2019-12-27

## 2019-12-20 RX ADMIN — LEVETIRACETAM 400 MILLIGRAM(S): 250 TABLET, FILM COATED ORAL at 18:00

## 2019-12-20 RX ADMIN — Medication 50 MICROGRAM(S): at 05:16

## 2019-12-20 RX ADMIN — Medication 100 MILLIGRAM(S): at 05:17

## 2019-12-20 RX ADMIN — FENTANYL CITRATE 25 MICROGRAM(S): 50 INJECTION INTRAVENOUS at 21:00

## 2019-12-20 RX ADMIN — Medication 667 MILLIGRAM(S): at 09:14

## 2019-12-20 RX ADMIN — LACOSAMIDE 110 MILLIGRAM(S): 50 TABLET ORAL at 18:00

## 2019-12-20 RX ADMIN — Medication 4 MILLIGRAM(S): at 18:00

## 2019-12-20 RX ADMIN — CLOPIDOGREL BISULFATE 75 MILLIGRAM(S): 75 TABLET, FILM COATED ORAL at 17:01

## 2019-12-20 RX ADMIN — CHLORHEXIDINE GLUCONATE 1 APPLICATION(S): 213 SOLUTION TOPICAL at 21:04

## 2019-12-20 RX ADMIN — Medication 81 MILLIGRAM(S): at 11:45

## 2019-12-20 RX ADMIN — CHLORHEXIDINE GLUCONATE 15 MILLILITER(S): 213 SOLUTION TOPICAL at 17:31

## 2019-12-20 RX ADMIN — CARVEDILOL PHOSPHATE 3.12 MILLIGRAM(S): 80 CAPSULE, EXTENDED RELEASE ORAL at 18:35

## 2019-12-20 RX ADMIN — LACOSAMIDE 110 MILLIGRAM(S): 50 TABLET ORAL at 05:17

## 2019-12-20 RX ADMIN — LEVETIRACETAM 400 MILLIGRAM(S): 250 TABLET, FILM COATED ORAL at 05:11

## 2019-12-20 RX ADMIN — HEPARIN SODIUM 5000 UNIT(S): 5000 INJECTION INTRAVENOUS; SUBCUTANEOUS at 18:00

## 2019-12-20 RX ADMIN — HEPARIN SODIUM 5000 UNIT(S): 5000 INJECTION INTRAVENOUS; SUBCUTANEOUS at 05:11

## 2019-12-20 RX ADMIN — CHLORHEXIDINE GLUCONATE 15 MILLILITER(S): 213 SOLUTION TOPICAL at 05:13

## 2019-12-20 RX ADMIN — LEVETIRACETAM 125 MILLIGRAM(S): 250 TABLET, FILM COATED ORAL at 16:46

## 2019-12-20 RX ADMIN — ATORVASTATIN CALCIUM 80 MILLIGRAM(S): 80 TABLET, FILM COATED ORAL at 21:04

## 2019-12-20 RX ADMIN — Medication 667 MILLIGRAM(S): at 17:01

## 2019-12-20 RX ADMIN — PANTOPRAZOLE SODIUM 40 MILLIGRAM(S): 20 TABLET, DELAYED RELEASE ORAL at 11:44

## 2019-12-20 RX ADMIN — INSULIN HUMAN 3 UNIT(S)/HR: 100 INJECTION, SOLUTION SUBCUTANEOUS at 21:03

## 2019-12-20 RX ADMIN — SENNA PLUS 2 TABLET(S): 8.6 TABLET ORAL at 21:04

## 2019-12-20 RX ADMIN — INSULIN HUMAN 3 UNIT(S)/HR: 100 INJECTION, SOLUTION SUBCUTANEOUS at 15:35

## 2019-12-20 RX ADMIN — FENTANYL CITRATE 25 MICROGRAM(S): 50 INJECTION INTRAVENOUS at 21:15

## 2019-12-20 RX ADMIN — Medication 667 MILLIGRAM(S): at 11:44

## 2019-12-20 RX ADMIN — Medication 4 MILLIGRAM(S): at 23:59

## 2019-12-20 NOTE — PROGRESS NOTE ADULT - SUBJECTIVE AND OBJECTIVE BOX
[facility]   MD Yokasta Gary D.O. Fatima Sheikh, D.O. Ruoru Wong, PA    From 7 AM - 5 PM:  OFFICE: [phone]  Dr. Barrientos cell: [phone]  Dr. Lopes cell: [phone]  Dr. Witt cell: [phone]  WALDO Valentin cell: [phone]    From 5 PM - 7 AM: Answering Service: [phone]        RENAL FOLLOW UP NOTE  --------------------------------------------------------------------------------  HPI: Pt seen and examined. Remains intubated, not on sedation, unresponsive. On EEG monitoring.        PAST HISTORY  --------------------------------------------------------------------------------  No significant changes to PMH, PSH, FHx, SHx, unless otherwise noted    ALLERGIES & MEDICATIONS  --------------------------------------------------------------------------------  Allergies    No Known Allergies    Intolerances      Standing Inpatient Medications  aspirin  chewable 81 milliGRAM(s) Oral daily  atorvastatin 80 milliGRAM(s) Oral at bedtime  calcium acetate 667 milliGRAM(s) Oral three times a day with meals  chlorhexidine 0.12% Liquid 15 milliLiter(s) Oral Mucosa every 12 hours  chlorhexidine 4% Liquid 1 Application(s) Topical <User Schedule>  clopidogrel Tablet 75 milliGRAM(s) Oral <User Schedule>  heparin  Injectable 5000 Unit(s) SubCutaneous every 12 hours  insulin regular Infusion 3 Unit(s)/Hr IV Continuous <Continuous>  lacosamide IVPB 50 milliGRAM(s) IV Intermittent every 12 hours  levETIRAcetam  IVPB 250 milliGRAM(s) IV Intermittent every 12 hours  levothyroxine 50 MICROGram(s) Oral daily  pantoprazole  Injectable 40 milliGRAM(s) IV Push daily  senna 2 Tablet(s) Oral at bedtime    PRN Inpatient Medications      REVIEW OF SYSTEMS  --------------------------------------------------------------------------------  unable to obtain    VITALS/PHYSICAL EXAM  --------------------------------------------------------------------------------  T(C): 36.7 (12-20-19 @ 07:00), Max: 37.3 (12-20-19 @ 03:00)  HR: 78 (12-20-19 @ 08:22) (66 - 87)  BP: 154/72 (12-20-19 @ 08:00) (110/54 - 177/73)  RR: 19 (12-20-19 @ 08:00) (17 - 25)  SpO2: 100% (12-20-19 @ 08:22) (99% - 100%)  Wt(kg): --    Weight (kg): 66.6 (12-20-19 @ 07:31)      12-19-19 @ 07:01  -  12-20-19 @ 07:00  --------------------------------------------------------  IN: 715 mL / OUT: 550 mL / NET: 165 mL    12-20-19 @ 07:01  -  12-20-19 @ 09:03  --------------------------------------------------------  IN: 40 mL / OUT: 0 mL / NET: 40 mL      Physical Exam:  	Gen: NAD, intubated, unresponsive  	HEENT: MMM  	Pulm: CTA B/L  	CV: S1S2  	Abd: Soft, +BS   	Ext: No LE edema B/L  	Neuro: non-responsive, not on sedation  	Skin: Warm and dry  	Vascular access: [facility] in place, dressing c/d/i    LABS/STUDIES  --------------------------------------------------------------------------------              8.4    12.28 >-----------<  238      [12-19-19 @ 22:48]              26.0     135  |  94  |  86  ----------------------------<  121      [12-19-19 @ 22:48]  4.0   |  23  |  6.92        Ca     8.8     [12-19-19 @ 22:48]      Mg     2.4     [12-19-19 @ 22:48]      Phos  4.1     [12-19-19 @ 22:48]      PT/INR: PT 13.7 , INR 1.19       [12-19-19 @ 07:55]  PTT: 28.7       [12-19-19 @ 07:55]      Creatinine Trend:  SCr 6.92 [12-19 @ 22:48]  SCr 5.71 [12-19 @ 07:55]  SCr 7.43 [12-18 @ 03:52]  SCr 5.53 [12-17 @ 03:07]  SCr 6.73 [12-16 @ 04:27]    Urinalysis - [12-14-19 @ 01:09]      Color Yellow / Appearance Clear / SG 1.005 / pH 7.0      Gluc 50 / Ketone Negative  / Bili Negative / Urobili Negative       Blood Trace / Protein 500 / Leuk Est Negative / Nitrite Negative      RBC 3-5 / WBC 0-2 / Hyaline  / Gran  / Sq Epi  / Non Sq Epi Occasional / Bacteria Few      Iron 26, TIBC 205, %sat 13      [06-04-19 @ 09:27]  Ferritin 99      [06-04-19 @ 09:33]  PTH -- (Ca 6.9)      [06-05-19 @ 08:14]   562  HbA1c 8.4      [12-15-19 @ 11:00]  TSH 0.88      [12-19-19 @ 10:33]  Lipid: chol 134, , HDL 56, LDL 52      [12-15-19 @ 10:56]    HBsAb >1000.0      [12-14-19 @ 23:48]  HBsAg Nonreact      [12-14-19 @ 23:48]  HCV 0.15, Nonreact      [12-14-19 @ 23:48]

## 2019-12-20 NOTE — CONSULT NOTE ADULT - SUBJECTIVE AND OBJECTIVE BOX
CHIEF COMPLAINT:Patient is a 64y old  Male who presents with a chief complaint of Seizures (20 Dec 2019 06:20)      HISTORY OF PRESENT ILLNESS:    64 male with history as below cad s/p stents last in [facility] in june 2019,   HTN, DM, left carotid stenosis CKD on HD   admitted with unresponsiveness , cva s/p TPA with post SAH   on vent   ROS limited  history taken from chart     PAST MEDICAL & SURGICAL HISTORY:  Hypothyroidism  CRI (Chronic Renal Insufficiency)  CAD (Coronary Artery Disease): with Stents in 06/2009 and 6/2019  Dyslipidemia  Diabetes  Hypertension  History of insertion of stent into coronary artery bypass graft: 2009 and 2019          MEDICATIONS:  aspirin  chewable 81 milliGRAM(s) Oral daily  clopidogrel Tablet 75 milliGRAM(s) Oral <User Schedule>  heparin  Injectable 5000 Unit(s) SubCutaneous every 12 hours        lacosamide IVPB 50 milliGRAM(s) IV Intermittent every 12 hours  levETIRAcetam  IVPB 250 milliGRAM(s) IV Intermittent every 12 hours    pantoprazole  Injectable 40 milliGRAM(s) IV Push daily  senna 2 Tablet(s) Oral at bedtime    atorvastatin 80 milliGRAM(s) Oral at bedtime  insulin regular Infusion 3 Unit(s)/Hr IV Continuous <Continuous>  levothyroxine 50 MICROGram(s) Oral daily    calcium acetate 667 milliGRAM(s) Oral three times a day with meals  chlorhexidine 0.12% Liquid 15 milliLiter(s) Oral Mucosa every 12 hours  chlorhexidine 4% Liquid 1 Application(s) Topical <User Schedule>      FAMILY HISTORY:      Non-contributory    SOCIAL HISTORY:    No tobacco, drugs or etoh    Allergies    No Known Allergies    Intolerances    	    REVIEW OF SYSTEMS:  as above  The rest of the 14 points ROS reviewed and except above they are unremarkable.        PHYSICAL EXAM:  T(C): 37.3 (12-20-19 @ 03:00), Max: 37.3 (12-20-19 @ 03:00)  HR: 82 (12-20-19 @ 06:00) (66 - 87)  BP: 171/75 (12-20-19 @ 06:00) (103/49 - 174/77)  RR: 20 (12-20-19 @ 06:00) (17 - 25)  SpO2: 100% (12-20-19 @ 06:00) (99% - 100%)  Wt(kg): --  I&O's Summary    19 Dec 2019 07:01  -  20 Dec 2019 07:00  --------------------------------------------------------  IN: 685 mL / OUT: 550 mL / NET: 135 mL        Appearance: on vent 	  Cardiovascular: Normal S1 S2,    Murmur:   Neck: JVP limited to be evaluated   Respiratory: Lungs few rhonchi   Gastrointestinal:  Soft  Skin: normal   Neuro: limited as pt on vent      LABS/DATA:    TELEMETRY: 	    ECG:  	   	  CARDIAC MARKERS:                                      8.4    12.28 )-----------( 238      ( 19 Dec 2019 22:48 )             26.0     12-19    135  |  94<L>  |  86<H>  ----------------------------<  121<H>  4.0   |  23  |  6.92<H>    Ca    8.8      19 Dec 2019 22:48  Phos  4.1     12-19  Mg     2.4     12-19      proBNP:   Lipid Profile:   HgA1c:   TSH: Thyroid Stimulating Hormone, Serum: 0.88 uIU/mL (12-19 @ 10:33)    < from: TTE Echo Doppler w/o Cont (12.15.19 @ 08:32) >  Summary:   1. Left ventricular ejection fraction, by visual estimation, is 60 to   65%.   2. Normal global left ventricular systolic function.   3. Mild mitralannular calcification.   4. Trace mitral valve regurgitation.    [id number] Peggy QUEZADA  Electronically signed on 12/15/2019 at 12:18:18 PM       < end of copied text >

## 2019-12-20 NOTE — PROGRESS NOTE ADULT - SUBJECTIVE AND OBJECTIVE BOX
EEG showing epileptogenic foci, but no seizures  no cough leak    Vitals/labs/meds/imaging reviewed  EO to stim, grimaces, pupils 2mm reactive b/l, no movements to noxious x4, but saw L foot spontaneously move EEG showing epileptogenic foci, but no seizures  no cough leak    Vitals/labs/meds/imaging reviewed  EO to stim, grimaces, pupils 2mm reactive b/l, no movements to noxious x4, but L foot spontaneously move

## 2019-12-20 NOTE — PROGRESS NOTE ADULT - ASSESSMENT
subacute stroke  seizures--reported suspicion for status epilepticus at OSH    pressure support as tolerated  decadron x24hrs for airway edema  stroke core measures  MRI  cont lacosamide, levetiracetam  phenobarb d/c'ed  cont vEEG monitoring  HD tomorrow  cont insulin gtt subacute stroke  seizures--reported suspicion for status epilepticus at OSH    pressure support as tolerated  decadron x24hrs for airway edema  chest PT/frequent suctioning  stroke core measures  MRI  cont lacosamide, levetiracetam  phenobarb d/c'ed  cont vEEG monitoring  HD tomorrow  cont insulin gtt

## 2019-12-20 NOTE — PATIENT PROFILE ADULT - VISION (WITH CORRECTIVE LENSES IF THE PATIENT USUALLY WEARS THEM):
Partially impaired: cannot see medication labels or newsprint, but can see obstacles in path, and the surrounding layout; can count fingers at arm's length declines

## 2019-12-20 NOTE — PROGRESS NOTE ADULT - SUBJECTIVE AND OBJECTIVE BOX
SUMMARY:  From notes,   "65 yo M with h/o CAD s/p stent 2009 and abnormal cath 9/2018 with 90% pRCA lesion (not intervened upon) with placement of drug eluting stent everolimus Synergy  3x 28 mm in June 2019, HTN, DM,  L carotid stenosis, hypothyroidism and CKD stage 5  (M-W-FRI  awaiting renal transplant expected within 3 months. Pt presented  to the ER for altered mental status. EMS contacted by wife when pt noted unresponsive, aphasic and not moving extremities; pt Rx'd with TPA. 12/14 pt had CT neck/head; read as SAH. Early am 12/15 pt with Sz and started on Keppra.12/15 pt had MR head: Numerous acute diffuse bilateral supratentorial and infratentorial infarcts. CT/MR head reviewed with radiology. Pt did not have SAH it was contrast from the studies. Poor MS 2 to multiple cerebral infarcts. During HD pt went into A fib which may be the possible cause of the strokes. s/p intubation 12/16 for airway protection"    *Unresponsive during dialysis 12/13 then to ED. SBPs 240s dropped to 140s on cardene in ED.     Per signout, patient was in NCSE whenever stat EEG was checked at OSH (x 3). He was transferred to [facility] for continuous EEG monitoring. Patient was noted to be Keppra 250 iv q12, Vimpat 50 iv q12 and Phenobarb 100 iv q8H    *** HIGH RISK OF DVT PRESENT ON ADMISSION ***    Overnight Events: Remains on v-EEG - no seizures noted.     ROS: Unable to obtain since patient is intubated    VITALS:   T(C): 37.3 (12-20-19 @ 03:00), Max: 37.3 (12-20-19 @ 03:00)  HR: 82 (12-20-19 @ 06:00) (66 - 87)  BP: 171/75 (12-20-19 @ 06:00) (103/49 - 174/77)  RR: 20 (12-20-19 @ 06:00) (17 - 25)  SpO2: 100% (12-20-19 @ 06:00) (99% - 100%)    12-19-19 @ 07:01  -  12-20-19 @ 07:00  --------------------------------------------------------  IN: 685 mL / OUT: 550 mL / NET: 135 mL      LABS:  ABG - ( 19 Dec 2019 07:41 )  pH, Arterial: 7.49  pH, Blood: x     /  pCO2: 35    /  pO2: 176   / HCO3: 26    / Base Excess: 3.0   /  SaO2: 99                      8.4    12.28 )-----------( 238      ( 19 Dec 2019 22:48 )             26.0     12-19    135  |  94<L>  |  86<H>  ----------------------------<  121<H>  4.0   |  23  |  6.92<H>    Ca    8.8      19 Dec 2019 22:48  Phos  4.1     12-19  Mg     2.4     12-19      PT/INR - ( 19 Dec 2019 07:55 )   PT: 13.7 sec;   INR: 1.19 ratio         PTT - ( 19 Dec 2019 07:55 )  PTT:28.7 sec  MEDS:  MEDICATIONS  (STANDING):  aspirin  chewable 81 milliGRAM(s) Oral daily  atorvastatin 80 milliGRAM(s) Oral at bedtime  calcium acetate 667 milliGRAM(s) Oral three times a day with meals  chlorhexidine 0.12% Liquid 15 milliLiter(s) Oral Mucosa every 12 hours  chlorhexidine 4% Liquid 1 Application(s) Topical <User Schedule>  clopidogrel Tablet 75 milliGRAM(s) Oral <User Schedule>  heparin  Injectable 5000 Unit(s) SubCutaneous every 12 hours  insulin regular Infusion 3 Unit(s)/Hr (3 mL/Hr) IV Continuous <Continuous>  lacosamide IVPB 50 milliGRAM(s) IV Intermittent every 12 hours  levETIRAcetam  IVPB 250 milliGRAM(s) IV Intermittent every 12 hours  levothyroxine 50 MICROGram(s) Oral daily  pantoprazole  Injectable 40 milliGRAM(s) IV Push daily  PHENobarbital Injectable 100 milliGRAM(s) IV Push every 8 hours  senna 2 Tablet(s) Oral at bedtime    EXAMINATION:  General:  calm, intubated  HEENT:  MMM  Neuro:  No EO, PERRL 2mm, VOR intact,   +cough/gag/corneals  grimace to nox stim  No movement in ext  Cards:  RRR  Respiratory:  no respiratory distress  Adomen:  soft  Extremities:  no edema  Skin:  warm/dry SUMMARY:  From notes,   "63 yo M with h/o CAD s/p stent 2009 and abnormal cath 9/2018 with 90% pRCA lesion (not intervened upon) with placement of drug eluting stent everolimus Synergy  3x 28 mm in June 2019, HTN, DM,  L carotid stenosis, hypothyroidism and CKD stage 5  (M-W-FRI  awaiting renal transplant expected within 3 months. Pt presented  to the ER for altered mental status. EMS contacted by wife when pt noted unresponsive, aphasic and not moving extremities; pt Rx'd with TPA. 12/14 pt had CT neck/head; read as SAH. Early am 12/15 pt with Sz and started on Keppra.12/15 pt had MR head: Numerous acute diffuse bilateral supratentorial and infratentorial infarcts. CT/MR head reviewed with radiology. Pt did not have SAH it was contrast from the studies. Poor MS 2 to multiple cerebral infarcts. During HD pt went into A fib which may be the possible cause of the strokes. s/p intubation 12/16 for airway protection"    *Unresponsive during dialysis 12/13 then to ED. SBPs 240s dropped to 140s on cardene in ED.     Per signout, patient was in NCSE whenever stat EEG was checked at OSH (x 3). He was transferred to [facility] for continuous EEG monitoring. Patient was noted to be Keppra 250 iv q12, Vimpat 50 iv q12 and Phenobarb 100 iv q8H    *** HIGH RISK OF DVT PRESENT ON ADMISSION ***    Overnight Events: Remains on v-EEG - no seizures noted.     ROS: Unable to obtain since patient is intubated    VITALS:   T(C): 37.3 (12-20-19 @ 03:00), Max: 37.3 (12-20-19 @ 03:00)  HR: 82 (12-20-19 @ 06:00) (66 - 87)  BP: 171/75 (12-20-19 @ 06:00) (103/49 - 174/77)  RR: 20 (12-20-19 @ 06:00) (17 - 25)  SpO2: 100% (12-20-19 @ 06:00) (99% - 100%)    12-19-19 @ 07:01  -  12-20-19 @ 07:00  --------------------------------------------------------  IN: 685 mL / OUT: 550 mL / NET: 135 mL      LABS:  ABG - ( 19 Dec 2019 07:41 )  pH, Arterial: 7.49  pH, Blood: x     /  pCO2: 35    /  pO2: 176   / HCO3: 26    / Base Excess: 3.0   /  SaO2: 99                      8.4    12.28 )-----------( 238      ( 19 Dec 2019 22:48 )             26.0     12-19    135  |  94<L>  |  86<H>  ----------------------------<  121<H>  4.0   |  23  |  6.92<H>    Ca    8.8      19 Dec 2019 22:48  Phos  4.1     12-19  Mg     2.4     12-19      PT/INR - ( 19 Dec 2019 07:55 )   PT: 13.7 sec;   INR: 1.19 ratio         PTT - ( 19 Dec 2019 07:55 )  PTT:28.7 sec    EEG SUMMARY/CLASSIFICATION  Abnormal EEG in an altered  patient.  - Continuous asynchronous LPDs over the left occipital and independently over the left frontocentral region.  - Continuous polymorphic delta slowing in the left parasagittal region.   - Moderate to severe generalized slowing.      MEDS:  MEDICATIONS  (STANDING):  aspirin  chewable 81 milliGRAM(s) Oral daily  atorvastatin 80 milliGRAM(s) Oral at bedtime  calcium acetate 667 milliGRAM(s) Oral three times a day with meals  chlorhexidine 0.12% Liquid 15 milliLiter(s) Oral Mucosa every 12 hours  chlorhexidine 4% Liquid 1 Application(s) Topical <User Schedule>  clopidogrel Tablet 75 milliGRAM(s) Oral <User Schedule>  heparin  Injectable 5000 Unit(s) SubCutaneous every 12 hours  insulin regular Infusion 3 Unit(s)/Hr (3 mL/Hr) IV Continuous <Continuous>  lacosamide IVPB 50 milliGRAM(s) IV Intermittent every 12 hours  levETIRAcetam  IVPB 250 milliGRAM(s) IV Intermittent every 12 hours  levothyroxine 50 MICROGram(s) Oral daily  pantoprazole  Injectable 40 milliGRAM(s) IV Push daily  PHENobarbital Injectable 100 milliGRAM(s) IV Push every 8 hours  senna 2 Tablet(s) Oral at bedtime    EXAMINATION:  General:  calm, intubated  HEENT:  MMM  Neuro:  No EO, PERRL 2mm, VOR intact,   +cough/gag/corneals  grimace to nox stim  No movement in ext  Cards:  RRR  Respiratory:  no respiratory distress  Adomen:  soft  Extremities:  no edema  Skin:  warm/dry SUMMARY:  From notes,   "63 yo M with h/o CAD s/p stent 2009 and abnormal cath 9/2018 with 90% pRCA lesion (not intervened upon) with placement of drug eluting stent everolimus Synergy  3x 28 mm in June 2019, HTN, DM,  L carotid stenosis, hypothyroidism and CKD stage 5  (M-W-FRI  awaiting renal transplant expected within 3 months. Pt presented  to the ER for altered mental status. EMS contacted by wife when pt noted unresponsive, aphasic and not moving extremities; pt Rx'd with TPA. 12/14 pt had CT neck/head; read as SAH. Early am 12/15 pt with Sz and started on Keppra.12/15 pt had MR head: Numerous acute diffuse bilateral supratentorial and infratentorial infarcts. CT/MR head reviewed with radiology. Pt did not have SAH it was contrast from the studies. Poor MS 2 to multiple cerebral infarcts. During HD pt went into A fib which may be the possible cause of the strokes. s/p intubation 12/16 for airway protection"    *Unresponsive during dialysis 12/13 then to ED. SBPs 240s dropped to 140s on cardene in ED.     Per signout, patient was in NCSE whenever stat EEG was checked at OSH (x 3). He was transferred to [facility] for continuous EEG monitoring. Patient was noted to be Keppra 250 iv q12, Vimpat 50 iv q12 and Phenobarb 100 iv q8H    *** HIGH RISK OF DVT PRESENT ON ADMISSION ***    Overnight Events: Remains on v-EEG - no seizures noted.     ROS: Unable to obtain since patient is intubated    VITALS:   T(C): 37.3 (12-20-19 @ 03:00), Max: 37.3 (12-20-19 @ 03:00)  HR: 82 (12-20-19 @ 06:00) (66 - 87)  BP: 171/75 (12-20-19 @ 06:00) (103/49 - 174/77)  RR: 20 (12-20-19 @ 06:00) (17 - 25)  SpO2: 100% (12-20-19 @ 06:00) (99% - 100%)    12-19-19 @ 07:01  -  12-20-19 @ 07:00  --------------------------------------------------------  IN: 685 mL / OUT: 550 mL / NET: 135 mL      LABS:  ABG - ( 19 Dec 2019 07:41 )  pH, Arterial: 7.49  pH, Blood: x     /  pCO2: 35    /  pO2: 176   / HCO3: 26    / Base Excess: 3.0   /  SaO2: 99                      8.4    12.28 )-----------( 238      ( 19 Dec 2019 22:48 )             26.0     12-19    135  |  94<L>  |  86<H>  ----------------------------<  121<H>  4.0   |  23  |  6.92<H>    Ca    8.8      19 Dec 2019 22:48  Phos  4.1     12-19  Mg     2.4     12-19    PHB 8.5    PT/INR - ( 19 Dec 2019 07:55 )   PT: 13.7 sec;   INR: 1.19 ratio         PTT - ( 19 Dec 2019 07:55 )  PTT:28.7 sec    EEG SUMMARY/CLASSIFICATION  Abnormal EEG in an altered  patient.  - Continuous asynchronous LPDs over the left occipital and independently over the left frontocentral region.  - Continuous polymorphic delta slowing in the left parasagittal region.   - Moderate to severe generalized slowing.      MEDS:  MEDICATIONS  (STANDING):  aspirin  chewable 81 milliGRAM(s) Oral daily  atorvastatin 80 milliGRAM(s) Oral at bedtime  calcium acetate 667 milliGRAM(s) Oral three times a day with meals  chlorhexidine 0.12% Liquid 15 milliLiter(s) Oral Mucosa every 12 hours  chlorhexidine 4% Liquid 1 Application(s) Topical <User Schedule>  clopidogrel Tablet 75 milliGRAM(s) Oral <User Schedule>  heparin  Injectable 5000 Unit(s) SubCutaneous every 12 hours  insulin regular Infusion 3 Unit(s)/Hr (3 mL/Hr) IV Continuous <Continuous>  lacosamide IVPB 50 milliGRAM(s) IV Intermittent every 12 hours  levETIRAcetam  IVPB 250 milliGRAM(s) IV Intermittent every 12 hours  levothyroxine 50 MICROGram(s) Oral daily  pantoprazole  Injectable 40 milliGRAM(s) IV Push daily  senna 2 Tablet(s) Oral at bedtime    EXAMINATION:  General:  calm, intubated  HEENT:  MMM  Neuro:  No EO, PERRL 2mm, VOR intact,   +cough/gag/corneals  grimace to nox stim  No movement in bilat upper ext  LLE spont  RLE WD  Cards:  RRR  Respiratory:  no respiratory distress  Adomen:  soft  Extremities:  no edema  Skin:  warm/dry

## 2019-12-20 NOTE — PROGRESS NOTE ADULT - ASSESSMENT
Summary: 63 yo M with h/o CAD s/p stent 2009 and abnormal cath 9/2018 with 90% pRCA lesion not intervened upon) with placement of drug eluting stent everolimus Synergy  3x 28 mm in June 2019, HTN, DM,  L carotid stenosis, hypothyroidism and CKD stage 5  (M-W-FRI  awaiting renal transplant expected within 3 months presented after unresponsiveness at HD, now s/p tpa and found to be in status epilepticus    NEURO:  status epilepticus in setting of strokes likely hypoperfusion  q1h neuro checks  vEEG  vimpat, Keppra   Phenobarb level - 8.5 (level)  ? DC Phenobarb  Off sedation  Bedrest    CARDS:  CAD, hypotensive  SBP<160, MAP>65 with subacute stroke  levo prn  Echo LV 60-65% (12/15/19)  ASA/plavix for stents  statin  hold coreg, hydral  EKG    PULM:  sat > 92%  mech vent  CPAP for exercise    RENAL:  CKD stage V awaiting transplant  nephrology consulted- HD MWF  anuric  On Calcium acetate 667 TID (home med)  baseline bladder scan    GASTRO:  On TF  Bowel regimen  ---> Stress ulcer prophylaxis:  PPI    HEME:  monitor H/H    ---> DVT prophylaxis: SCDs, heparin 5000 q12h    ENDO:  hypothyroid, DMII  cont synthroid  euglycemia  on insulin gtt, A1c 8.4    ID:  afebrile  mild leukocytosis    Code status:  Full code  Disposition:  ICU    This patient was at high risk of neurologic deterioration and/or death due to: seizures    Time spent:  45 minutes Summary: 65 yo M with h/o CAD s/p stent 2009 and abnormal cath 9/2018 with 90% pRCA lesion not intervened upon) with placement of drug eluting stent everolimus Synergy  3x 28 mm in June 2019, HTN, DM,  L carotid stenosis, hypothyroidism and CKD stage 5  (M-W-FRI  awaiting renal transplant expected within 3 months presented after unresponsiveness at HD, now s/p tpa and found to be in status epilepticus    NEURO:  status epilepticus in setting of strokes likely hypoperfusion  q2h neuro checks  vEEG  vimpat, Keppra (redose post HD)  Phenobarb level - 8.5 (level)  ? DC Phenobarb  Off sedation  Bedrest    CARDS:  CAD, hypotensive  SBP<160, MAP>65 with subacute stroke  levo prn  Echo LV 60-65% (12/15/19)  ASA/plavix for stents  statin  hold coreg, hydral  EKG    PULM:  sat > 92%  mech vent  CPAP for exercise  SBT    RENAL:  CKD stage V awaiting transplant  nephrology consulted- HD MWF  anuric  On Calcium acetate 667 TID (home med)  baseline bladder scan    GASTRO:  On TF  Bowel regimen  ---> Stress ulcer prophylaxis:  PPI    HEME:  monitor H/H    ---> DVT prophylaxis: SCDs, heparin 5000 q12h    ENDO:  hypothyroid, DMII  cont synthroid  euglycemia  on insulin gtt, A1c 8.4  will need long acting    ID:  afebrile  mild leukocytosis    Code status:  Full code  Disposition:  ICU    This patient was at high risk of neurologic deterioration and/or death due to: seizures    Time spent:  45 minutes Summary: 65 yo M with h/o CAD s/p stent 2009 and abnormal cath 9/2018 with 90% pRCA lesion not intervened upon) with placement of drug eluting stent everolimus Synergy  3x 28 mm in June 2019, HTN, DM,  L carotid stenosis, hypothyroidism and CKD stage 5  (M-W-FRI  awaiting renal transplant expected within 3 months presented after unresponsiveness at HD, now s/p tpa and found to be in status epilepticus    NEURO:  status epilepticus in setting of bilateral strokes likely etiology hypoperfusion  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"  q2h neuro checks  vEEG  vimpat, Keppra (redose post HD)  DC Phenobarb  Off sedation  Bedrest    CARDS:  CAD, hypotensive  SBP<160, MAP>65 with subacute stroke  levo prn  Echo LV 60-65% (12/15/19)  ASA/plavix for stents  statin  hold coreg, hydral  EKG    PULM:  sat > 92%  mech vent  CPAP for exercise  SBT    RENAL:  CKD stage V awaiting transplant  nephrology consulted- HD MWF  anuric  On Calcium acetate 667 TID (home med)  baseline bladder scan    GASTRO:  On TF  Bowel regimen  ---> Stress ulcer prophylaxis:  PPI    HEME:  monitor H/H    ---> DVT prophylaxis: SCDs, heparin 5000 q12h    ENDO:  hypothyroid, DMII  cont synthroid  euglycemia  on insulin gtt, A1c 8.4  will need long acting    ID:  afebrile  mild leukocytosis    Code status:  Full code  Disposition:  ICU    This patient was at high risk of neurologic deterioration and/or death due to: seizures    Time spent:  45 minutes

## 2019-12-20 NOTE — CONSULT NOTE ADULT - ASSESSMENT
CVA  SAH  cad history of stents  HTN  CKD    on antiplt therapy   had mild elevated trop but normal LV function likely stress induced   cont statin  Permissive HTN as per Neurology   fu with renal , HD   will follow up

## 2019-12-20 NOTE — PROGRESS NOTE ADULT - ASSESSMENT
63 yo male with ESRD on HD (M-W-FRI without AVF as awaiting renal transplant expected within 3 months; Dr. Barrientos; [facility]), CAD s/p stent 2009 and CARLA in June 2019, HTN, DM type 2, hypothyroidism and left carotid stenosis admitted for altered mental status during dialysis, he became unresponsive and arm not moving therefore he was sent to hospital. At [facility], he received tPA on 12/13/19. Was noted to have SAH after tPA. Started on 3% saline. Had seizure, despite multiple AEDs remained in status epilepticus and was transferred to [facility] for continuous EEG monitoring. Nephrology is following for ESRD management.    ESRD on HD MWF  - had HD Wednesday 12/18/19 in [facility] prior to transfer  - HD consent obtained via telephone consent from son (please see separate Chart Note from 12/19/19 for consent details)    - will have HD today    - please dose Keppra post HD on HD days   - consider increasing vimpat or keppra dose if pt having seizures on EEG     Anemia  - Hgb below goal; unable to use epogen due to CVA  - transfuse PRN Hgb <7     HTN  - management per NSICU team; goal <150/90  - Will avoid hypotension w/ HD

## 2019-12-21 LAB
ANION GAP SERPL CALC-SCNC: 18 MMOL/L — HIGH (ref 5–17)
BASE EXCESS BLDA CALC-SCNC: 2.2 MMOL/L — HIGH (ref -2–2)
BUN SERPL-MCNC: 70 MG/DL — HIGH (ref 7–23)
CALCIUM SERPL-MCNC: 8.8 MG/DL — SIGNIFICANT CHANGE UP (ref 8.4–10.5)
CHLORIDE SERPL-SCNC: 96 MMOL/L — SIGNIFICANT CHANGE UP (ref 96–108)
CO2 BLDA-SCNC: 26 MMOL/L — SIGNIFICANT CHANGE UP (ref 22–30)
CO2 SERPL-SCNC: 23 MMOL/L — SIGNIFICANT CHANGE UP (ref 22–31)
CREAT SERPL-MCNC: 5.45 MG/DL — HIGH (ref 0.5–1.3)
GAS PNL BLDA: SIGNIFICANT CHANGE UP
GLUCOSE SERPL-MCNC: 172 MG/DL — HIGH (ref 70–99)
HCO3 BLDA-SCNC: 25 MMOL/L — SIGNIFICANT CHANGE UP (ref 21–29)
HOROWITZ INDEX BLDA+IHG-RTO: 30 — SIGNIFICANT CHANGE UP
PCO2 BLDA: 32 MMHG — SIGNIFICANT CHANGE UP (ref 32–46)
PH BLDA: 7.5 — HIGH (ref 7.35–7.45)
PO2 BLDA: 180 MMHG — HIGH (ref 74–108)
POTASSIUM SERPL-MCNC: 4.5 MMOL/L — SIGNIFICANT CHANGE UP (ref 3.5–5.3)
POTASSIUM SERPL-SCNC: 4.5 MMOL/L — SIGNIFICANT CHANGE UP (ref 3.5–5.3)
SAO2 % BLDA: 99 % — HIGH (ref 92–96)
SODIUM SERPL-SCNC: 137 MMOL/L — SIGNIFICANT CHANGE UP (ref 135–145)

## 2019-12-21 PROCEDURE — 99291 CRITICAL CARE FIRST HOUR: CPT

## 2019-12-21 PROCEDURE — 95951: CPT | Mod: 26

## 2019-12-21 PROCEDURE — 71045 X-RAY EXAM CHEST 1 VIEW: CPT | Mod: 26

## 2019-12-21 PROCEDURE — 99223 1ST HOSP IP/OBS HIGH 75: CPT | Mod: GC

## 2019-12-21 PROCEDURE — 99292 CRITICAL CARE ADDL 30 MIN: CPT

## 2019-12-21 RX ORDER — HUMAN INSULIN 100 [IU]/ML
15 INJECTION, SUSPENSION SUBCUTANEOUS EVERY 6 HOURS
Refills: 0 | Status: DISCONTINUED | OUTPATIENT
Start: 2019-12-21 | End: 2019-12-23

## 2019-12-21 RX ORDER — HYDRALAZINE HCL 50 MG
5 TABLET ORAL ONCE
Refills: 0 | Status: COMPLETED | OUTPATIENT
Start: 2019-12-21 | End: 2019-12-21

## 2019-12-21 RX ORDER — INSULIN LISPRO 100/ML
VIAL (ML) SUBCUTANEOUS EVERY 6 HOURS
Refills: 0 | Status: DISCONTINUED | OUTPATIENT
Start: 2019-12-21 | End: 2020-01-02

## 2019-12-21 RX ORDER — ACETYLCYSTEINE 200 MG/ML
4 VIAL (ML) MISCELLANEOUS EVERY 6 HOURS
Refills: 0 | Status: COMPLETED | OUTPATIENT
Start: 2019-12-21 | End: 2019-12-22

## 2019-12-21 RX ORDER — IPRATROPIUM/ALBUTEROL SULFATE 18-103MCG
3 AEROSOL WITH ADAPTER (GRAM) INHALATION EVERY 6 HOURS
Refills: 0 | Status: DISCONTINUED | OUTPATIENT
Start: 2019-12-21 | End: 2020-01-11

## 2019-12-21 RX ORDER — SODIUM CHLORIDE 5 G/100ML
1000 INJECTION, SOLUTION INTRAVENOUS
Refills: 0 | Status: DISCONTINUED | OUTPATIENT
Start: 2019-12-21 | End: 2019-12-21

## 2019-12-21 RX ADMIN — LACOSAMIDE 110 MILLIGRAM(S): 50 TABLET ORAL at 17:54

## 2019-12-21 RX ADMIN — HUMAN INSULIN 15 UNIT(S): 100 INJECTION, SUSPENSION SUBCUTANEOUS at 23:44

## 2019-12-21 RX ADMIN — Medication 4 MILLIGRAM(S): at 12:15

## 2019-12-21 RX ADMIN — CHLORHEXIDINE GLUCONATE 15 MILLILITER(S): 213 SOLUTION TOPICAL at 05:16

## 2019-12-21 RX ADMIN — CHLORHEXIDINE GLUCONATE 1 APPLICATION(S): 213 SOLUTION TOPICAL at 21:03

## 2019-12-21 RX ADMIN — Medication 5 MILLIGRAM(S): at 20:00

## 2019-12-21 RX ADMIN — SENNA PLUS 2 TABLET(S): 8.6 TABLET ORAL at 21:04

## 2019-12-21 RX ADMIN — CARVEDILOL PHOSPHATE 3.12 MILLIGRAM(S): 80 CAPSULE, EXTENDED RELEASE ORAL at 17:58

## 2019-12-21 RX ADMIN — Medication 50 MICROGRAM(S): at 05:17

## 2019-12-21 RX ADMIN — CHLORHEXIDINE GLUCONATE 15 MILLILITER(S): 213 SOLUTION TOPICAL at 17:53

## 2019-12-21 RX ADMIN — Medication 4: at 17:53

## 2019-12-21 RX ADMIN — Medication 4 MILLIGRAM(S): at 05:17

## 2019-12-21 RX ADMIN — LEVETIRACETAM 400 MILLIGRAM(S): 250 TABLET, FILM COATED ORAL at 17:58

## 2019-12-21 RX ADMIN — CARVEDILOL PHOSPHATE 3.12 MILLIGRAM(S): 80 CAPSULE, EXTENDED RELEASE ORAL at 05:17

## 2019-12-21 RX ADMIN — Medication 81 MILLIGRAM(S): at 12:16

## 2019-12-21 RX ADMIN — Medication 4 MILLILITER(S): at 23:52

## 2019-12-21 RX ADMIN — HUMAN INSULIN 15 UNIT(S): 100 INJECTION, SUSPENSION SUBCUTANEOUS at 12:15

## 2019-12-21 RX ADMIN — LACOSAMIDE 110 MILLIGRAM(S): 50 TABLET ORAL at 05:16

## 2019-12-21 RX ADMIN — Medication 2: at 23:44

## 2019-12-21 RX ADMIN — Medication 5 MILLIGRAM(S): at 06:51

## 2019-12-21 RX ADMIN — ATORVASTATIN CALCIUM 80 MILLIGRAM(S): 80 TABLET, FILM COATED ORAL at 21:04

## 2019-12-21 RX ADMIN — PANTOPRAZOLE SODIUM 40 MILLIGRAM(S): 20 TABLET, DELAYED RELEASE ORAL at 12:16

## 2019-12-21 RX ADMIN — LEVETIRACETAM 400 MILLIGRAM(S): 250 TABLET, FILM COATED ORAL at 05:16

## 2019-12-21 RX ADMIN — HEPARIN SODIUM 5000 UNIT(S): 5000 INJECTION INTRAVENOUS; SUBCUTANEOUS at 05:16

## 2019-12-21 RX ADMIN — OXYCODONE HYDROCHLORIDE 10 MILLIGRAM(S): 5 TABLET ORAL at 00:00

## 2019-12-21 RX ADMIN — HUMAN INSULIN 15 UNIT(S): 100 INJECTION, SUSPENSION SUBCUTANEOUS at 17:54

## 2019-12-21 RX ADMIN — SODIUM CHLORIDE 50 MILLILITER(S): 5 INJECTION, SOLUTION INTRAVENOUS at 05:14

## 2019-12-21 RX ADMIN — HEPARIN SODIUM 5000 UNIT(S): 5000 INJECTION INTRAVENOUS; SUBCUTANEOUS at 17:53

## 2019-12-21 RX ADMIN — Medication 3 MILLILITER(S): at 18:02

## 2019-12-21 RX ADMIN — CLOPIDOGREL BISULFATE 75 MILLIGRAM(S): 75 TABLET, FILM COATED ORAL at 17:53

## 2019-12-21 RX ADMIN — OXYCODONE HYDROCHLORIDE 10 MILLIGRAM(S): 5 TABLET ORAL at 00:30

## 2019-12-21 RX ADMIN — Medication 3 MILLILITER(S): at 23:52

## 2019-12-21 NOTE — AIRWAY REMOVAL NOTE  ADULT & PEDS - ARTIFICAL AIRWAY REMOVAL COMMENTS
Written order for extubation verified. The patient was identified by full name and birth date compared to the identification band. Present during the procedure was TAO [place].

## 2019-12-21 NOTE — PROGRESS NOTE ADULT - SUBJECTIVE AND OBJECTIVE BOX
SUMMARY:  From notes,   "65 yo M with h/o CAD s/p stent 2009 and abnormal cath 9/2018 with 90% pRCA lesion (not intervened upon) with placement of drug eluting stent everolimus Synergy  3x 28 mm in June 2019, HTN, DM,  L carotid stenosis, hypothyroidism and CKD stage 5  (M-W-FRI  awaiting renal transplant expected within 3 months. Pt presented  to the ER for altered mental status. EMS contacted by wife when pt noted unresponsive, aphasic and not moving extremities; pt Rx'd with TPA. 12/14 pt had CT neck/head; read as SAH. Early am 12/15 pt with Sz and started on Keppra.12/15 pt had MR head: Numerous acute diffuse bilateral supratentorial and infratentorial infarcts. CT/MR head reviewed with radiology. Pt did not have SAH it was contrast from the studies. Poor MS 2 to multiple cerebral infarcts. During HD pt went into A fib which may be the possible cause of the strokes. s/p intubation 12/16 for airway protection"    *Unresponsive during dialysis 12/13 then to ED. SBPs 240s dropped to 140s on cardene in ED.     Per signout, patient was in NCSE whenever stat EEG was checked at OSH (x 3). He was transferred to [facility] for continuous EEG monitoring. Patient was noted to be Keppra 250 iv q12, Vimpat 50 iv q12 and Phenobarb 100 iv q8H    *** HIGH RISK OF DVT PRESENT ON ADMISSION ***    Overnight Events: Following commands on LE yesterday for RN. No movement in b/l UE -- concern for man-in-barrel syndrome    ROS: Unable to obtain since patient is intubated.     VITALS:   T(C): 37.1 (12-21-19 @ 07:00), Max: 37.9 (12-20-19 @ 19:00)  HR: 88 (12-21-19 @ 08:25) (74 - 90)  BP: 172/73 (12-21-19 @ 08:00) (142/69 - 184/82)  RR: 24 (12-21-19 @ 08:00) (19 - 28)  SpO2: 100% (12-21-19 @ 08:25) (93% - 100%)    12-20-19 @ 07:01  -  12-21-19 @ 07:00  --------------------------------------------------------  IN: 2138 mL / OUT: 1255 mL / NET: 883 mL    12-21-19 @ 07:01  -  12-21-19 @ 08:42  --------------------------------------------------------  IN: 105 mL / OUT: 0 mL / NET: 105 mL      LABS:                          8.8    11.43 )-----------( 285      ( 20 Dec 2019 20:57 )             27.9     12-20    133<L>  |  93<L>  |  46<H>  ----------------------------<  304<H>  4.4   |  25  |  4.03<H>    Ca    8.7      20 Dec 2019 20:57  Phos  3.4     12-20  Mg     2.1     12-20        MEDS:  MEDICATIONS  (STANDING):  aspirin  chewable 81 milliGRAM(s) Oral daily  atorvastatin 80 milliGRAM(s) Oral at bedtime  calcium acetate 667 milliGRAM(s) Oral three times a day with meals  carvedilol 3.125 milliGRAM(s) Oral every 12 hours  chlorhexidine 0.12% Liquid 15 milliLiter(s) Oral Mucosa every 12 hours  chlorhexidine 4% Liquid 1 Application(s) Topical <User Schedule>  clopidogrel Tablet 75 milliGRAM(s) Oral <User Schedule>  dexAMETHasone  Injectable 4 milliGRAM(s) IV Push every 6 hours  heparin  Injectable 5000 Unit(s) SubCutaneous every 12 hours  insulin regular Infusion 3 Unit(s)/Hr (3 mL/Hr) IV Continuous <Continuous>  lacosamide IVPB 50 milliGRAM(s) IV Intermittent every 12 hours  levETIRAcetam  IVPB 250 milliGRAM(s) IV Intermittent every 12 hours  levETIRAcetam  Solution 125 milliGRAM(s) Oral daily  levothyroxine 50 MICROGram(s) Oral daily  pantoprazole  Injectable 40 milliGRAM(s) IV Push daily  senna 2 Tablet(s) Oral at bedtime  sodium chloride 2% . 1000 milliLiter(s) (50 mL/Hr) IV Continuous <Continuous>      EXAMINATION:  General:  calm, intubated  HEENT:  MMM  Neuro:  No EO, PERRL 2mm, VOR intact,   +cough/gag/corneals  grimace to nox stim  No movement in bilat upper ext  LLE spont  RLE WD  Cards:  RRR  Respiratory:  no respiratory distress  Adomen:  soft  Extremities:  no edema  Skin:  warm/dry SUMMARY:  From notes,   "63 yo M with h/o CAD s/p stent 2009 and abnormal cath 9/2018 with 90% pRCA lesion (not intervened upon) with placement of drug eluting stent everolimus Synergy  3x 28 mm in June 2019, HTN, DM,  L carotid stenosis, hypothyroidism and CKD stage 5  (M-W-FRI  awaiting renal transplant expected within 3 months. Pt presented  to the ER for altered mental status. EMS contacted by wife when pt noted unresponsive, aphasic and not moving extremities; pt Rx'd with TPA. 12/14 pt had CT neck/head; read as SAH. Early am 12/15 pt with Sz and started on Keppra.12/15 pt had MR head: Numerous acute diffuse bilateral supratentorial and infratentorial infarcts. CT/MR head reviewed with radiology. Pt did not have SAH it was contrast from the studies. Poor MS 2 to multiple cerebral infarcts. During HD pt went into A fib which may be the possible cause of the strokes. s/p intubation 12/16 for airway protection"    *Unresponsive during dialysis 12/13 then to ED. SBPs 240s dropped to 140s on cardene in ED.     Per signout, patient was in NCSE whenever stat EEG was checked at OSH (x 3). He was transferred to [facility] for continuous EEG monitoring. Patient was noted to be Keppra 250 iv q12, Vimpat 50 iv q12 and Phenobarb 100 iv q8H    *** HIGH RISK OF DVT PRESENT ON ADMISSION ***    Overnight Events: Following commands on LE yesterday for RN. No movement in b/l UE -- concern for man-in-barrel syndrome    ROS: Unable to obtain since patient is intubated.     VITALS:   T(C): 37.1 (12-21-19 @ 07:00), Max: 37.9 (12-20-19 @ 19:00)  HR: 88 (12-21-19 @ 08:25) (74 - 90)  BP: 172/73 (12-21-19 @ 08:00) (142/69 - 184/82)  RR: 24 (12-21-19 @ 08:00) (19 - 28)  SpO2: 100% (12-21-19 @ 08:25) (93% - 100%)    12-20-19 @ 07:01  -  12-21-19 @ 07:00  --------------------------------------------------------  IN: 2138 mL / OUT: 1255 mL / NET: 883 mL    12-21-19 @ 07:01  -  12-21-19 @ 08:42  --------------------------------------------------------  IN: 105 mL / OUT: 0 mL / NET: 105 mL      LABS:                          8.8    11.43 )-----------( 285      ( 20 Dec 2019 20:57 )             27.9     12-20    133<L>  |  93<L>  |  46<H>  ----------------------------<  304<H>  4.4   |  25  |  4.03<H>    Ca    8.7      20 Dec 2019 20:57  Phos  3.4     12-20  Mg     2.1     12-20        MEDS:  MEDICATIONS  (STANDING):  aspirin  chewable 81 milliGRAM(s) Oral daily  atorvastatin 80 milliGRAM(s) Oral at bedtime  calcium acetate 667 milliGRAM(s) Oral three times a day with meals  carvedilol 3.125 milliGRAM(s) Oral every 12 hours  chlorhexidine 0.12% Liquid 15 milliLiter(s) Oral Mucosa every 12 hours  chlorhexidine 4% Liquid 1 Application(s) Topical <User Schedule>  clopidogrel Tablet 75 milliGRAM(s) Oral <User Schedule>  dexAMETHasone  Injectable 4 milliGRAM(s) IV Push every 6 hours  heparin  Injectable 5000 Unit(s) SubCutaneous every 12 hours  insulin regular Infusion 3 Unit(s)/Hr (3 mL/Hr) IV Continuous <Continuous>  lacosamide IVPB 50 milliGRAM(s) IV Intermittent every 12 hours  levETIRAcetam  IVPB 250 milliGRAM(s) IV Intermittent every 12 hours  levETIRAcetam  Solution 125 milliGRAM(s) Oral daily  levothyroxine 50 MICROGram(s) Oral daily  pantoprazole  Injectable 40 milliGRAM(s) IV Push daily  senna 2 Tablet(s) Oral at bedtime  sodium chloride 2% . 1000 milliLiter(s) (50 mL/Hr) IV Continuous <Continuous>      EXAMINATION:  General:  calm, intubated  HEENT:  MMM  Neuro:  EO spont, follows commands in native language (wiggles toes), R sided neglect   PERRL 2mm, VOR intact, face symmetric  +cough/gag/corneals  grimace to nox stim  No movement in bilat upper ext  LLE spont  RLE WD  Cards:  RRR  Respiratory:  no respiratory distress  Adomen:  soft  Extremities:  no edema  Skin:  warm/dry

## 2019-12-21 NOTE — PROGRESS NOTE ADULT - ASSESSMENT
65 yo male with ESRD on HD (M-W-FRI without AVF as awaiting renal transplant expected within 3 months; Dr. Barrientos; [facility]), CAD s/p stent 2009 and CARLA in June 2019, HTN, DM type 2, hypothyroidism and left carotid stenosis admitted for altered mental status during dialysis, he became unresponsive and arm not moving therefore he was sent to hospital. At [facility], he received tPA on 12/13/19. Was noted to have SAH after tPA. Started on 3% saline. Had seizure, despite multiple AEDs remained in status epilepticus and was transferred to [facility] for continuous EEG monitoring. Nephrology is following for ESRD management.    ESRD on HD MWF  - HD consent in the chart  - s/p HD yesterday  - please dose Keppra post HD on HD days        Anemia  - Hgb below goal; unable to use epogen due to CVA  - transfuse PRN , goal Hb >8.0    HTN  - management per NSICU team   - Will avoid hypotension w/ HD

## 2019-12-21 NOTE — CONSULT NOTE ADULT - SUBJECTIVE AND OBJECTIVE BOX
Patient seen and evaluated at bedside    Chief Complaint:  AMS    HPI:  Patient is a 64 year old male with a history of CAD s/p stents 2009 and 2019, HTN, DM, Carotid stenosis, Hypothyroidism, and ESRD on HD who presented to outside hospital with altered mental status as he was unresponsive, aphasic, and not moving any extremities.  Was treated with tPA and admitted for observation.  Patient noted to have seizure during hospital course and was started on Keppra.  MRI brain was done which showed bilateral supratentorial and infratentorial infarcts.  Patient was undergoing dialysis when he developed A. fib and was intubated for airway protection.  Neurology following and multiple EEGs were done which showed patient in status epilepticus despite multiple antiepilectics.  Patient now transferred to [facility] for further managemen    Pt followed by Dr. Bose as an outpatient.  House cardiology to resume care.  Pt still AOx0 however following simple commands.      PMHx:   Hypothyroidism  CRI (Chronic Renal Insufficiency)  CAD (Coronary Artery Disease)  CAD (Coronary Artery Disease)  Dyslipidemia  Diabetes  Hypertension      PSHx:   History of insertion of stent into coronary artery bypass graft  No significant past surgical history      Allergies:  No Known Allergies      Home Meds: per med rec    Current Medications:   acetylcysteine 20%  Inhalation 4 milliLiter(s) Inhalation every 6 hours  albuterol/ipratropium for Nebulization 3 milliLiter(s) Nebulizer every 6 hours  aspirin  chewable 81 milliGRAM(s) Oral daily  atorvastatin 80 milliGRAM(s) Oral at bedtime  calcium acetate 667 milliGRAM(s) Oral three times a day with meals  carvedilol 3.125 milliGRAM(s) Oral every 12 hours  chlorhexidine 4% Liquid 1 Application(s) Topical <User Schedule>  clopidogrel Tablet 75 milliGRAM(s) Oral <User Schedule>  heparin  Injectable 5000 Unit(s) SubCutaneous every 12 hours  insulin lispro (HumaLOG) corrective regimen sliding scale   SubCutaneous every 6 hours  insulin NPH human recombinant 15 Unit(s) SubCutaneous every 6 hours  lacosamide IVPB 50 milliGRAM(s) IV Intermittent every 12 hours  levETIRAcetam  IVPB 250 milliGRAM(s) IV Intermittent every 12 hours  levETIRAcetam  Solution 125 milliGRAM(s) Oral daily  levothyroxine 50 MICROGram(s) Oral daily  oxyCODONE    IR 5 milliGRAM(s) Oral every 4 hours PRN  oxyCODONE    IR 10 milliGRAM(s) Oral every 4 hours PRN  pantoprazole  Injectable 40 milliGRAM(s) IV Push daily  senna 2 Tablet(s) Oral at bedtime      FAMILY HISTORY: non-contributory      Social History:  Smoking History: unknown  Alcohol Use: unknown  Drug Use: unknown    REVIEW OF SYSTEMS:  Constitutional:     [x ] negative [ ] fevers [ ] chills [ ] weight loss [ ] weight gain  HEENT:                  [x ] negative [ ] dry eyes [ ] eye irritation [ ] postnasal drip [ ] nasal congestion  CV:                         [ x] negative  [ ] chest pain [ ] orthopnea [ ] palpitations [ ] murmur  Resp:                     [x ] negative [ ] cough [ ] shortness of breath [ ] dyspnea [ ] wheezing [ ] sputum [ ]hemoptysis  GI:                          [ x] negative [ ] nausea [ ] vomiting [ ] diarrhea [ ] constipation [ ] abd pain [ ] dysphagia   :                        [ x] negative [ ] dysuria [ ] nocturia [ ] hematuria [ ] increased urinary frequency  Musculoskeletal: [x ] negative [ ] back pain [ ] myalgias [ ] arthralgias [ ] fracture  Skin:                       [ x] negative [ ] rash [ ] itch  Neurological:        [ x] negative [ ] headache [ ] dizziness [ ] syncope [ ] weakness [ ] numbness  Psychiatric:           [ x] negative [ ] anxiety [ ] depression  Endocrine:            [ x] negative [ ] diabetes [ ] thyroid problem  Heme/Lymph:      [ x] negative [ ] anemia [ ] bleeding problem  Allergic/Immune: [ x] negative [ ] itchy eyes [ ] nasal discharge [ ] hives [ ] angioedema    [ x] All other systems negative  [ ] Unable to assess ROS due to      Physical Exam:  T(F): 98.3 (12-21), Max: 98.9 (12-21)  HR: 87 (12-21) (78 - 92)  BP: 127/60 (12-21) (127/60 - 184/82)  RR: 22 (12-21)  SpO2: 100% (12-21)  GENERAL: No acute distress, well-developed  HEAD:  Atraumatic, Normocephalic  ENT: EOMI, PERRLA, conjunctiva and sclera clear, Neck supple, No JVD, moist mucosa  CHEST/LUNG: Clear to auscultation bilaterally; No wheeze, equal breath sounds bilaterally   BACK: No spinal tenderness  HEART: Regular rate and rhythm; No murmurs, rubs, or gallops  ABDOMEN: Soft, Nontender, Nondistended; Bowel sounds present  EXTREMITIES:  No clubbing, cyanosis, or edema  PSYCH: Nl behavior, nl affect  NEUROLOGY: AAOx0, moves toes on command, tracks voices  SKIN: Normal color, No rashes or lesions  LINES:    Cardiovascular Diagnostic Testing:    Echo: Personally reviewed: 12/15/19  PHYSICIAN INTERPRETATION:  Left Ventricle:  Global LV systolic function was normal. Left ventricular ejection   fraction, by visual estimation, is 60 to 65%.  Right Ventricle: Normal right ventricular size and function.  Left Atrium: The left atrium is normal in size.  Right Atrium: The right atrium is normal in size.  Mitral Valve: The mitral valve is normal in structure. There is mild   mitral annular calcification. Trace mitral valve regurgitation is seen.  Tricuspid Valve: The tricuspid valve is normal in structure. Mild   tricuspid regurgitation is visualized.  Aortic Valve: The aortic valve is normal. Peak transaortic gradient   equals 10.0 mmHg, mean transaortic gradient equals 5.8 mmHg, the   calculated aortic valve area equals 3.04 cm² by the continuity equation   consistent with normally opening aortic valve. No evidence of aortic   valve regurgitation is seen.  Pulmonic Valve: The pulmonic valve was not well visualized. Trace   pulmonic valve regurgitation.      Stress Testing:     Cath: 6/19  VENTRICLES: Global left ventricular function was normal. EF estimated was  60 %.  CORONARY VESSELS: The coronary circulation is left dominant.  LM:   --  LM: Angiography showed minor luminal irregularities with no flow  limiting lesions.  LAD:   --  Proximal LAD: There was a 80 % stenosis.  CX:   --  Circumflex: Angiography showed moderate atherosclerosis.  --  OM1: There was a 0 % stenosis at the site of a prior stent.    RCA:   --  Proximal RCA: The vessel was very small sized. Angiography  showed severe atherosclerosis.  COMPLICATIONS: There were no complications.  SUMMARY:  CORONARY VESSELS: LM: Angiography showed minor luminal irregularities with  no flow limiting lesions. Proximal LAD: There was a 80 % stenosis.  Circumflex: Angiography showed moderate atherosclerosis. OM1: There was a  0 % stenosis at the site of a prior stent. Proximal RCA: The vessel was  very small sized. Angiography showed severe atherosclerosis.  CARDIAC STRUCTURES: Global left ventricular function was normal. EF  estimated was 60 %.  DIAGNOSTIC RECOMMENDATIONS: OM IFR normal.      Imaging:    CXR: Personally reviewed    Labs: Personally reviewed                        8.8    11.43 )-----------( 285      ( 20 Dec 2019 20:57 )             27.9     12-21    137  |  96  |  70<H>  ----------------------------<  172<H>  4.5   |  23  |  5.45<H>    Ca    8.8      21 Dec 2019 09:29  Phos  3.4     12-20  Mg     2.1     12-20        Hemoglobin A1C, Whole Blood: 8.4 % (12-15 @ 11:00)    Thyroid Stimulating Hormone, Serum: 0.88 uIU/mL (12-19 @ 10:33) Patient seen and evaluated at bedside    Chief Complaint:  AMS    HPI:  Patient is a 64 year old male with a history of CAD s/p stents 2009 and 2019, HTN, DM, Carotid stenosis, Hypothyroidism, and ESRD on HD who presented to outside hospital with altered mental status as he was unresponsive, aphasic, and not moving any extremities.  Was treated with tPA and admitted for observation.  Patient noted to have seizure during hospital course and was started on Keppra.  MRI brain was done which showed bilateral supratentorial and infratentorial infarcts.  Patient was undergoing dialysis when he developed A. fib and was intubated for airway protection.  Neurology following and multiple EEGs were done which showed patient in status epilepticus despite multiple antiepilectics.  Patient now transferred to [facility] for further managemen    Pt followed by Dr. Bose as an outpatient.  House cardiology to resume care.  Pt still AOx0 however following simple commands.    ===========================  Interval Events  -   ===========================        PMHx:   Hypothyroidism  CRI (Chronic Renal Insufficiency)  CAD (Coronary Artery Disease)  CAD (Coronary Artery Disease)  Dyslipidemia  Diabetes  Hypertension      PSHx:   History of insertion of stent into coronary artery bypass graft  No significant past surgical history      Allergies:  No Known Allergies      Home Meds: per med rec    Current Medications:   acetylcysteine 20%  Inhalation 4 milliLiter(s) Inhalation every 6 hours  albuterol/ipratropium for Nebulization 3 milliLiter(s) Nebulizer every 6 hours  aspirin  chewable 81 milliGRAM(s) Oral daily  atorvastatin 80 milliGRAM(s) Oral at bedtime  calcium acetate 667 milliGRAM(s) Oral three times a day with meals  carvedilol 3.125 milliGRAM(s) Oral every 12 hours  chlorhexidine 4% Liquid 1 Application(s) Topical <User Schedule>  clopidogrel Tablet 75 milliGRAM(s) Oral <User Schedule>  heparin  Injectable 5000 Unit(s) SubCutaneous every 12 hours  insulin lispro (HumaLOG) corrective regimen sliding scale   SubCutaneous every 6 hours  insulin NPH human recombinant 15 Unit(s) SubCutaneous every 6 hours  lacosamide IVPB 50 milliGRAM(s) IV Intermittent every 12 hours  levETIRAcetam  IVPB 250 milliGRAM(s) IV Intermittent every 12 hours  levETIRAcetam  Solution 125 milliGRAM(s) Oral daily  levothyroxine 50 MICROGram(s) Oral daily  oxyCODONE    IR 5 milliGRAM(s) Oral every 4 hours PRN  oxyCODONE    IR 10 milliGRAM(s) Oral every 4 hours PRN  pantoprazole  Injectable 40 milliGRAM(s) IV Push daily  senna 2 Tablet(s) Oral at bedtime      FAMILY HISTORY:   Father-HTN      Social History:  Smoking History: unknown  Alcohol Use: unknown  Drug Use: unknown    REVIEW OF SYSTEMS:  Constitutional:     [x ] negative [ ] fevers [ ] chills [ ] weight loss [ ] weight gain  HEENT:                  [x ] negative [ ] dry eyes [ ] eye irritation [ ] postnasal drip [ ] nasal congestion  CV:                         [ x] negative  [ ] chest pain [ ] orthopnea [ ] palpitations [ ] murmur  Resp:                     [x ] negative [ ] cough [ ] shortness of breath [ ] dyspnea [ ] wheezing [ ] sputum [ ]hemoptysis  GI:                          [ x] negative [ ] nausea [ ] vomiting [ ] diarrhea [ ] constipation [ ] abd pain [ ] dysphagia   :                        [ x] negative [ ] dysuria [ ] nocturia [ ] hematuria [ ] increased urinary frequency  Musculoskeletal: [x ] negative [ ] back pain [ ] myalgias [ ] arthralgias [ ] fracture  Skin:                       [ x] negative [ ] rash [ ] itch  Neurological:        [ x] negative [ ] headache [ ] dizziness [ ] syncope [ ] weakness [ ] numbness  Psychiatric:           [ x] negative [ ] anxiety [ ] depression  Endocrine:            [ x] negative [ ] diabetes [ ] thyroid problem  Heme/Lymph:      [ x] negative [ ] anemia [ ] bleeding problem  Allergic/Immune: [ x] negative [ ] itchy eyes [ ] nasal discharge [ ] hives [ ] angioedema    [ ] All other systems negative  [x ] Unable to assess ROS due tomental status      Physical Exam:  T(F): 98.3 (12-21), Max: 98.9 (12-21)  HR: 87 (12-21) (78 - 92)  BP: 127/60 (12-21) (127/60 - 184/82)  RR: 22 (12-21)  SpO2: 100% (12-21)  GENERAL: No acute distress, well-developed  HEAD:  Atraumatic, Normocephalic  ENT: EOMI, PERRLA, conjunctiva and sclera clear, Neck supple, No JVD, moist mucosa  CHEST/LUNG: Clear to auscultation bilaterally; No wheeze, equal breath sounds bilaterally   BACK: No spinal tenderness  HEART: Regular rate and rhythm; No murmurs, rubs, or gallops  ABDOMEN: Soft, Nontender, Nondistended; Bowel sounds present  EXTREMITIES:  No clubbing, cyanosis, or edema  PSYCH: Nl behavior, nl affect  NEUROLOGY: AAOx0, moves toes on command, tracks voices  SKIN: Normal color, No rashes or lesions  LINES:    Cardiovascular Diagnostic Testing:    Echo: Personally reviewed: 12/15/19  PHYSICIAN INTERPRETATION:  Left Ventricle:  Global LV systolic function was normal. Left ventricular ejection   fraction, by visual estimation, is 60 to 65%.  Right Ventricle: Normal right ventricular size and function.  Left Atrium: The left atrium is normal in size.  Right Atrium: The right atrium is normal in size.  Mitral Valve: The mitral valve is normal in structure. There is mild   mitral annular calcification. Trace mitral valve regurgitation is seen.  Tricuspid Valve: The tricuspid valve is normal in structure. Mild   tricuspid regurgitation is visualized.  Aortic Valve: The aortic valve is normal. Peak transaortic gradient   equals 10.0 mmHg, mean transaortic gradient equals 5.8 mmHg, the   calculated aortic valve area equals 3.04 cm² by the continuity equation   consistent with normally opening aortic valve. No evidence of aortic   valve regurgitation is seen.  Pulmonic Valve: The pulmonic valve was not well visualized. Trace   pulmonic valve regurgitation.      Stress Testing:     Cath: 6/19  VENTRICLES: Global left ventricular function was normal. EF estimated was  60 %.  CORONARY VESSELS: The coronary circulation is left dominant.  LM:   --  LM: Angiography showed minor luminal irregularities with no flow  limiting lesions.  LAD:   --  Proximal LAD: There was a 80 % stenosis.  CX:   --  Circumflex: Angiography showed moderate atherosclerosis.  --  OM1: There was a 0 % stenosis at the site of a prior stent.    RCA:   --  Proximal RCA: The vessel was very small sized. Angiography  showed severe atherosclerosis.  COMPLICATIONS: There were no complications.  SUMMARY:  CORONARY VESSELS: LM: Angiography showed minor luminal irregularities with  no flow limiting lesions. Proximal LAD: There was a 80 % stenosis.  Circumflex: Angiography showed moderate atherosclerosis. OM1: There was a  0 % stenosis at the site of a prior stent. Proximal RCA: The vessel was  very small sized. Angiography showed severe atherosclerosis.  CARDIAC STRUCTURES: Global left ventricular function was normal. EF  estimated was 60 %.  DIAGNOSTIC RECOMMENDATIONS: OM IFR normal.      Imaging:    CXR: Personally reviewed    Labs: Personally reviewed 12/21                        8.8    11.43 )-----------( 285      ( 20 Dec 2019 20:57 )             27.9     12-21    137  |  96  |  70<H>  ----------------------------<  172<H>  4.5   |  23  |  5.45<H>    Ca    8.8      21 Dec 2019 09:29  Phos  3.4     12-20  Mg     2.1     12-20        Hemoglobin A1C, Whole Blood: 8.4 % (12-15 @ 11:00)    Thyroid Stimulating Hormone, Serum: 0.88 uIU/mL (12-19 @ 10:33)

## 2019-12-21 NOTE — PROGRESS NOTE ADULT - SUBJECTIVE AND OBJECTIVE BOX
Dr. Barrientos  Office [phone]  Cell [phone]  Karolina HAAS  Cell [phone]      Patient is a 64y old  Male who presents with a chief complaint of Seizures (21 Dec 2019 10:52)      Patient seen and examined at bedside. No apparent distress, family at the bedside    VITALS:  T(F): 98.9 (12-21-19 @ 11:00), Max: 100.2 (12-20-19 @ 19:00)  HR: 87 (12-21-19 @ 13:57)  BP: 134/68 (12-21-19 @ 12:00)  RR: 14 (12-21-19 @ 12:00)  SpO2: 100% (12-21-19 @ 13:57)  Wt(kg): --    12-20 @ 07:01  -  12-21 @ 07:00  --------------------------------------------------------  IN: 2138 mL / OUT: 1255 mL / NET: 883 mL    12-21 @ 07:01  -  12-21 @ 14:19  --------------------------------------------------------  IN: 210 mL / OUT: 0 mL / NET: 210 mL          PHYSICAL EXAM:  Constitutional: NAD  Neck: No JVD  Respiratory: CTAB, no wheezes, rales or rhonchi  Cardiovascular: S1, S2, RRR  Gastrointestinal: BS+, soft, NT/ND  Extremities: No peripheral edema    Hospital Medications:   MEDICATIONS  (STANDING):  aspirin  chewable 81 milliGRAM(s) Oral daily  atorvastatin 80 milliGRAM(s) Oral at bedtime  calcium acetate 667 milliGRAM(s) Oral three times a day with meals  carvedilol 3.125 milliGRAM(s) Oral every 12 hours  chlorhexidine 0.12% Liquid 15 milliLiter(s) Oral Mucosa every 12 hours  chlorhexidine 4% Liquid 1 Application(s) Topical <User Schedule>  clopidogrel Tablet 75 milliGRAM(s) Oral <User Schedule>  heparin  Injectable 5000 Unit(s) SubCutaneous every 12 hours  insulin lispro (HumaLOG) corrective regimen sliding scale   SubCutaneous every 6 hours  insulin NPH human recombinant 15 Unit(s) SubCutaneous every 6 hours  insulin regular Infusion 3 Unit(s)/Hr (3 mL/Hr) IV Continuous <Continuous>  lacosamide IVPB 50 milliGRAM(s) IV Intermittent every 12 hours  levETIRAcetam  IVPB 250 milliGRAM(s) IV Intermittent every 12 hours  levETIRAcetam  Solution 125 milliGRAM(s) Oral daily  levothyroxine 50 MICROGram(s) Oral daily  pantoprazole  Injectable 40 milliGRAM(s) IV Push daily  senna 2 Tablet(s) Oral at bedtime      LABS:  12-21    137  |  96  |  70<H>  ----------------------------<  172<H>  4.5   |  23  |  5.45<H>    Ca    8.8      21 Dec 2019 09:29  Phos  3.4     12-20  Mg     2.1     12-20      Creatinine Trend: 5.45 <--, 4.03 <--, 6.92 <--, 5.71 <--, 7.43 <--, 5.53 <--, 6.73 <--, 5.70 <--, 4.39 <--, 3.30 <--    Phosphorus Level, Serum: 3.4 mg/dL (12-20 @ 20:57)                              8.8    11.43 )-----------( 285      ( 20 Dec 2019 20:57 )             27.9     Urine Studies:  Urinalysis - [12-14-19 @ 01:09]      Color Yellow / Appearance Clear / SG 1.005 / pH 7.0      Gluc 50 / Ketone Negative  / Bili Negative / Urobili Negative       Blood Trace / Protein 500 / Leuk Est Negative / Nitrite Negative      RBC 3-5 / WBC 0-2 / Hyaline  / Gran  / Sq Epi  / Non Sq Epi Occasional / Bacteria Few      Iron 26, TIBC 205, %sat 13      [06-04-19 @ 09:27]  Ferritin 99      [06-04-19 @ 09:33]  PTH -- (Ca 6.9)      [06-05-19 @ 08:14]   562  HbA1c 8.4      [12-15-19 @ 11:00]  TSH 0.88      [12-19-19 @ 10:33]  Lipid: chol 134, , HDL 56, LDL 52      [12-15-19 @ 10:56]    HBsAb >1000.0      [12-14-19 @ 23:48]  HBsAg Nonreact      [12-14-19 @ 23:48]  HCV 0.15, Nonreact      [12-14-19 @ 23:48]      RADIOLOGY & ADDITIONAL STUDIES:

## 2019-12-21 NOTE — PROGRESS NOTE ADULT - ASSESSMENT
subacute stroke  seizures--reported suspicion for status epilepticus at OSH    extubated  chest PT/frequent suctioning  stroke core measures  neurology consult tomororw  MRI  cont lacosamide, levetiracetam renally dosed  HD M/W/D  NPH+ULCIA, maintain euglycemia

## 2019-12-21 NOTE — PROGRESS NOTE ADULT - ASSESSMENT
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"  q2h neuro checks  vEEG  vimpat, Keppra (redose post HD)  DC Phenobarb  Off sedation  Bedrest    CARDS:  CAD, hypotensive  SBP<160, MAP>65 with subacute stroke  levo prn  Echo LV 60-65% (12/15/19)  ASA/plavix for stents  statin  hold coreg, hydral  EKG    PULM:  sat > 92%  mech vent  CPAP for exercise  SBT    RENAL:  CKD stage V awaiting transplant  nephrology consulted- HD MWF  anuric  On Calcium acetate 667 TID (home med)  baseline bladder scan    GASTRO:  On TF  Bowel regimen  ---> Stress ulcer prophylaxis:  PPI    HEME:  monitor H/H    ---> DVT prophylaxis: SCDs, heparin 5000 q12h    ENDO:  hypothyroid, DMII  cont synthroid  euglycemia  on insulin gtt, A1c 8.4  will need long acting    ID:  afebrile  mild leukocytosis    Code status:  Full code  Disposition:  ICU    This patient was at high risk of neurologic deterioration and/or death due to: seizures    Time spent:  45 minutes 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"  q4h neuro checks  stop vEEG  vimpat, Keppra (redose post HD)  Off sedation  Bedrest    CARDS:  CAD, hypotensive  SBP<160, MAP>65 with subacute stroke  levo prn  Echo LV 60-65% (12/15/19)  ASA/plavix for stents  statin  restart home coreg, hydral  EKG    PULM:  sat > 92%  mech vent  SBT for possible extubation today  ABG  chest PT    RENAL:  CKD stage V awaiting transplant  nephrology consulted- HD MWF  anuric  On Calcium acetate 667 TID (home med)  baseline bladder scan    GASTRO:  On TF  Bowel regimen  ---> Stress ulcer prophylaxis:  PPI    HEME:  monitor H/H    ---> DVT prophylaxis: SCDs, heparin 5000 q12h    ENDO:  hypothyroid, DMII  cont synthroid  euglycemia  transition insulin gtt, A1c 8.4  15U q6 NPH and SSI    ID:  afebrile  mild leukocytosis    Code status:  Full code  Disposition:  ICU    This patient was at high risk of neurologic deterioration and/or death due to: seizures    Time spent:  45 minutes

## 2019-12-21 NOTE — PROGRESS NOTE ADULT - SUBJECTIVE AND OBJECTIVE BOX
extubated this afternoon  copious secretion    Vitals/labs/meds/imaging reviewed  EO to voice,  pupils 2mm reactive b/l, no movements in upper extremities, BLE wiggles toes to command

## 2019-12-21 NOTE — PROGRESS NOTE ADULT - SUBJECTIVE AND OBJECTIVE BOX
Subjective: Patient seen and examined. No new events except as noted.     SUBJECTIVE/ROS:  ROS is limited as pt currently sedated on ventilator.       MEDICATIONS:  MEDICATIONS  (STANDING):  aspirin  chewable 81 milliGRAM(s) Oral daily  atorvastatin 80 milliGRAM(s) Oral at bedtime  calcium acetate 667 milliGRAM(s) Oral three times a day with meals  carvedilol 3.125 milliGRAM(s) Oral every 12 hours  chlorhexidine 0.12% Liquid 15 milliLiter(s) Oral Mucosa every 12 hours  chlorhexidine 4% Liquid 1 Application(s) Topical <User Schedule>  clopidogrel Tablet 75 milliGRAM(s) Oral <User Schedule>  dexAMETHasone  Injectable 4 milliGRAM(s) IV Push every 6 hours  heparin  Injectable 5000 Unit(s) SubCutaneous every 12 hours  insulin regular Infusion 3 Unit(s)/Hr (3 mL/Hr) IV Continuous <Continuous>  lacosamide IVPB 50 milliGRAM(s) IV Intermittent every 12 hours  levETIRAcetam  IVPB 250 milliGRAM(s) IV Intermittent every 12 hours  levETIRAcetam  Solution 125 milliGRAM(s) Oral daily  levothyroxine 50 MICROGram(s) Oral daily  pantoprazole  Injectable 40 milliGRAM(s) IV Push daily  senna 2 Tablet(s) Oral at bedtime  sodium chloride 2% . 1000 milliLiter(s) (50 mL/Hr) IV Continuous <Continuous>      PHYSICAL EXAM:  T(C): 37.1 (12-21-19 @ 07:00), Max: 37.9 (12-20-19 @ 19:00)  HR: 84 (12-21-19 @ 10:00) (77 - 90)  BP: 154/74 (12-21-19 @ 10:00) (142/69 - 184/82)  RR: 15 (12-21-19 @ 10:00) (15 - 28)  SpO2: 100% (12-21-19 @ 10:00) (93% - 100%)  Wt(kg): --  I&O's Summary    20 Dec 2019 07:01  -  21 Dec 2019 07:00  --------------------------------------------------------  IN: 2138 mL / OUT: 1255 mL / NET: 883 mL    21 Dec 2019 07:01  -  21 Dec 2019 10:52  --------------------------------------------------------  IN: 210 mL / OUT: 0 mL / NET: 210 mL        Appearance: on vent 	  Cardiovascular: Normal S1 S2,    Murmur:   Neck: JVP limited to be evaluated   Respiratory: Lungs few rhonchi   Gastrointestinal:  Soft  Skin: normal   Neuro: limited as pt on vent      LABS/DATA:    CARDIAC MARKERS:                                8.8    11.43 )-----------( 285      ( 20 Dec 2019 20:57 )             27.9     12-21    137  |  96  |  70<H>  ----------------------------<  172<H>  4.5   |  23  |  5.45<H>    Ca    8.8      21 Dec 2019 09:29  Phos  3.4     12-20  Mg     2.1     12-20      proBNP:   Lipid Profile:   HgA1c:   TSH:     TELE:  EKG:

## 2019-12-21 NOTE — CONSULT NOTE ADULT - ASSESSMENT
64 M hx of CAD s/p 2009, 2019, HTN, DM, carotid stenosis, ESRD on HD p/w AMS found to have new afib and embolic CVA.    #afib  -paroxsymal, currently NSR  -suspected etiology for embolic CVA  -CHADsVAsc > 4, currently not on AC  -will discuss w/ neuro regarding initiation of AC as appropriate    #CAD  -currently on ASA/Plavix, lipitor, coreg  -depending on bleeding risk of CVA, will consider single antiplatelet therapy w/ AC    Will discuss w/ attending  Jose Carlos Green MD  Cardiology Fellow - PGY 4  Text or Call: [phone]  For all New Consults and Questions:  [email or web address]   Login: Offerum

## 2019-12-21 NOTE — EEG REPORT - NS EEG TEXT BOX
[facility]   COMPREHENSIVE EPILEPSY CENTER   REPORT OF CONTINUOUS VIDEO EEG     [facility]: [address], [place] [address]  [facility]: [id number] [place] [id number]  [facility]: [address] [place] [address]    Patient Name: CHAD DUNBAR  Age and : 64y ([birth year])  MRN #: [id number]  Location: [place] [address]  Referring Physician: Maris Pollard    Start Time/Date: 08:00 on 19  End Time/Date: 08:00 on 19    _____________________________________________________________  STUDY INFORMATION    EEG Recording Technique:  The patient underwent continuous Video-EEG monitoring, using Telemetry System hardware on the XLTek Digital System. EEG and video data were stored on a computer hard drive with important events saved in digital archive files. The material was reviewed by a physician (electroencephalographer / epileptologist) on a daily basis. Ramon and seizure detection algorithms were utilized and reviewed. An EEG Technician attended to the patient, and was available throughout daytime work hours.  The epilepsy center neurologist was available in person or on call 24-hours per day.    EEG Placement and Labeling of Electrodes:  The EEG was performed utilizing 20 channel referential EEG connections (coronal over temporal over parasagittal montage) using all standard 10-20 electrode placements with EKG, with additional electrodes placed in the inferior temporal region using the modified 10-10 montage electrode placements for elective admissions, or if deemed necessary. Recording was at a sampling rate of 256 samples per second per channel. Time synchronized digital video recording was done simultaneously with EEG recording. A low light infrared camera was used for low light recording.     _____________________________________________________________  HISTORY    Patient is a 64y old  Male who presents with a chief complaint of Seizures (20 Dec 2019 09:03)    PERTINENT MEDICATION:  lacosamide IVPB 50 milliGRAM(s) IV Intermittent every 12 hours  levETIRAcetam  IVPB 250 milliGRAM(s) IV Intermittent every 12 hours  levETIRAcetam  Solution 125 milliGRAM(s) Oral daily     _____________________________________________________________  STUDY INTERPRETATION    Findings: The background was continuous, spontaneously variable and minimally reactive. No posterior dominant rhythm seen.    Background Slowing:  Diffuse theta and polymorphic delta slowing.    Focal Slowing:   Continuous polymorphic delta slowing in the left parasagital region.     Sleep Background:  Stage II sleep transients were not recorded.    Other Non-Epileptiform Findings:  None were present.    Interictal Epileptiform Activity:   Continuous asynchronous sharp wave discharges LPDs over the left occipital region near 1hz and independently over the left frontocentral region near 1hz, increasing in frequency to 2hz, amplitude, and morphology during more awake state without evolution.    Events:  Clinical events: None recorded.  Seizures: None recorded.    Activation Procedures:   Hyperventilation was not performed.    Photic stimulation was not performed.       Artifacts:  Intermittent myogenic and movement artifacts were noted.    ECG:  The heart rate on single channel ECG was predominantly between 60-80 BPM.    _____________________________________________________________  EEG SUMMARY/CLASSIFICATION    Abnormal EEG in an altered  patient. Unchanged to prior  - Continuous asynchronous LPDs over the left occipital and independently over the left frontocentral region.  - Continuous polymorphic delta slowing in the left parasagittal region.   - Moderate generalized slowing.    _____________________________________________________________  EEG IMPRESSION/CLINICAL CORRELATE    Abnormal EEG study.  1. Independent epileptogenic foci in the left occipital and left frontocentral region, high risk for focal onset seizures.  2. Structural abnormality in the left parasagittal region.  3. Moderate nonspecific diffuse or multifocal cerebral dysfunction.   4. No seizure seen.

## 2019-12-22 DIAGNOSIS — N18.6 END STAGE RENAL DISEASE: ICD-10-CM

## 2019-12-22 DIAGNOSIS — Z76.82 AWAITING ORGAN TRANSPLANT STATUS: ICD-10-CM

## 2019-12-22 DIAGNOSIS — E87.1 HYPO-OSMOLALITY AND HYPONATREMIA: ICD-10-CM

## 2019-12-22 DIAGNOSIS — Z79.4 LONG TERM (CURRENT) USE OF INSULIN: ICD-10-CM

## 2019-12-22 DIAGNOSIS — Z95.5 PRESENCE OF CORONARY ANGIOPLASTY IMPLANT AND GRAFT: ICD-10-CM

## 2019-12-22 DIAGNOSIS — I16.1 HYPERTENSIVE EMERGENCY: ICD-10-CM

## 2019-12-22 DIAGNOSIS — I65.22 OCCLUSION AND STENOSIS OF LEFT CAROTID ARTERY: ICD-10-CM

## 2019-12-22 DIAGNOSIS — E11.9 TYPE 2 DIABETES MELLITUS WITHOUT COMPLICATIONS: ICD-10-CM

## 2019-12-22 DIAGNOSIS — I63.30 CEREBRAL INFARCTION DUE TO THROMBOSIS OF UNSPECIFIED CEREBRAL ARTERY: ICD-10-CM

## 2019-12-22 DIAGNOSIS — R47.1 DYSARTHRIA AND ANARTHRIA: ICD-10-CM

## 2019-12-22 DIAGNOSIS — Z99.2 DEPENDENCE ON RENAL DIALYSIS: ICD-10-CM

## 2019-12-22 DIAGNOSIS — I25.10 ATHEROSCLEROTIC HEART DISEASE OF NATIVE CORONARY ARTERY WITHOUT ANGINA PECTORIS: ICD-10-CM

## 2019-12-22 DIAGNOSIS — F32.9 MAJOR DEPRESSIVE DISORDER, SINGLE EPISODE, UNSPECIFIED: ICD-10-CM

## 2019-12-22 DIAGNOSIS — E03.9 HYPOTHYROIDISM, UNSPECIFIED: ICD-10-CM

## 2019-12-22 DIAGNOSIS — G40.901 EPILEPSY, UNSPECIFIED, NOT INTRACTABLE, WITH STATUS EPILEPTICUS: ICD-10-CM

## 2019-12-22 DIAGNOSIS — I67.4 HYPERTENSIVE ENCEPHALOPATHY: ICD-10-CM

## 2019-12-22 DIAGNOSIS — G81.91 HEMIPLEGIA, UNSPECIFIED AFFECTING RIGHT DOMINANT SIDE: ICD-10-CM

## 2019-12-22 DIAGNOSIS — I12.0 HYPERTENSIVE CHRONIC KIDNEY DISEASE WITH STAGE 5 CHRONIC KIDNEY DISEASE OR END STAGE RENAL DISEASE: ICD-10-CM

## 2019-12-22 DIAGNOSIS — E87.5 HYPERKALEMIA: ICD-10-CM

## 2019-12-22 DIAGNOSIS — I67.82 CEREBRAL ISCHEMIA: ICD-10-CM

## 2019-12-22 DIAGNOSIS — E11.22 TYPE 2 DIABETES MELLITUS WITH DIABETIC CHRONIC KIDNEY DISEASE: ICD-10-CM

## 2019-12-22 DIAGNOSIS — G93.6 CEREBRAL EDEMA: ICD-10-CM

## 2019-12-22 DIAGNOSIS — E78.5 HYPERLIPIDEMIA, UNSPECIFIED: ICD-10-CM

## 2019-12-22 DIAGNOSIS — R47.01 APHASIA: ICD-10-CM

## 2019-12-22 DIAGNOSIS — G51.0 BELL'S PALSY: ICD-10-CM

## 2019-12-22 DIAGNOSIS — I48.91 UNSPECIFIED ATRIAL FIBRILLATION: ICD-10-CM

## 2019-12-22 DIAGNOSIS — D64.9 ANEMIA, UNSPECIFIED: ICD-10-CM

## 2019-12-22 DIAGNOSIS — I24.8 OTHER FORMS OF ACUTE ISCHEMIC HEART DISEASE: ICD-10-CM

## 2019-12-22 LAB
ANION GAP SERPL CALC-SCNC: 20 MMOL/L — HIGH (ref 5–17)
BUN SERPL-MCNC: 93 MG/DL — HIGH (ref 7–23)
CALCIUM SERPL-MCNC: 8.9 MG/DL — SIGNIFICANT CHANGE UP (ref 8.4–10.5)
CHLORIDE SERPL-SCNC: 98 MMOL/L — SIGNIFICANT CHANGE UP (ref 96–108)
CO2 SERPL-SCNC: 23 MMOL/L — SIGNIFICANT CHANGE UP (ref 22–31)
CREAT SERPL-MCNC: 6.9 MG/DL — HIGH (ref 0.5–1.3)
GAS PNL BLDA: SIGNIFICANT CHANGE UP
GLUCOSE SERPL-MCNC: 186 MG/DL — HIGH (ref 70–99)
HCT VFR BLD CALC: 26.1 % — LOW (ref 39–50)
HGB BLD-MCNC: 8.2 G/DL — LOW (ref 13–17)
MAGNESIUM SERPL-MCNC: 2.6 MG/DL — SIGNIFICANT CHANGE UP (ref 1.6–2.6)
MCHC RBC-ENTMCNC: 28.3 PG — SIGNIFICANT CHANGE UP (ref 27–34)
MCHC RBC-ENTMCNC: 31.4 GM/DL — LOW (ref 32–36)
MCV RBC AUTO: 90 FL — SIGNIFICANT CHANGE UP (ref 80–100)
NRBC # BLD: 0 /100 WBCS — SIGNIFICANT CHANGE UP (ref 0–0)
PHOSPHATE SERPL-MCNC: 3.3 MG/DL — SIGNIFICANT CHANGE UP (ref 2.5–4.5)
PLATELET # BLD AUTO: 387 K/UL — SIGNIFICANT CHANGE UP (ref 150–400)
POTASSIUM SERPL-MCNC: 4.4 MMOL/L — SIGNIFICANT CHANGE UP (ref 3.5–5.3)
POTASSIUM SERPL-SCNC: 4.4 MMOL/L — SIGNIFICANT CHANGE UP (ref 3.5–5.3)
RBC # BLD: 2.9 M/UL — LOW (ref 4.2–5.8)
RBC # FLD: 16.7 % — HIGH (ref 10.3–14.5)
SODIUM SERPL-SCNC: 141 MMOL/L — SIGNIFICANT CHANGE UP (ref 135–145)
WBC # BLD: 16.05 K/UL — HIGH (ref 3.8–10.5)
WBC # FLD AUTO: 16.05 K/UL — HIGH (ref 3.8–10.5)

## 2019-12-22 PROCEDURE — 99233 SBSQ HOSP IP/OBS HIGH 50: CPT | Mod: GC

## 2019-12-22 PROCEDURE — 71045 X-RAY EXAM CHEST 1 VIEW: CPT | Mod: 26

## 2019-12-22 PROCEDURE — 99291 CRITICAL CARE FIRST HOUR: CPT

## 2019-12-22 RX ORDER — HYDRALAZINE HCL 50 MG
5 TABLET ORAL ONCE
Refills: 0 | Status: COMPLETED | OUTPATIENT
Start: 2019-12-22 | End: 2019-12-22

## 2019-12-22 RX ORDER — POLYETHYLENE GLYCOL 3350 17 G/17G
17 POWDER, FOR SOLUTION ORAL DAILY
Refills: 0 | Status: DISCONTINUED | OUTPATIENT
Start: 2019-12-22 | End: 2019-12-27

## 2019-12-22 RX ADMIN — LEVETIRACETAM 125 MILLIGRAM(S): 250 TABLET, FILM COATED ORAL at 19:40

## 2019-12-22 RX ADMIN — ATORVASTATIN CALCIUM 80 MILLIGRAM(S): 80 TABLET, FILM COATED ORAL at 21:26

## 2019-12-22 RX ADMIN — Medication 81 MILLIGRAM(S): at 12:21

## 2019-12-22 RX ADMIN — CARVEDILOL PHOSPHATE 3.12 MILLIGRAM(S): 80 CAPSULE, EXTENDED RELEASE ORAL at 05:42

## 2019-12-22 RX ADMIN — OXYCODONE HYDROCHLORIDE 5 MILLIGRAM(S): 5 TABLET ORAL at 17:14

## 2019-12-22 RX ADMIN — Medication 3 MILLILITER(S): at 23:45

## 2019-12-22 RX ADMIN — SENNA PLUS 2 TABLET(S): 8.6 TABLET ORAL at 21:26

## 2019-12-22 RX ADMIN — HUMAN INSULIN 15 UNIT(S): 100 INJECTION, SUSPENSION SUBCUTANEOUS at 23:43

## 2019-12-22 RX ADMIN — Medication 667 MILLIGRAM(S): at 19:21

## 2019-12-22 RX ADMIN — HUMAN INSULIN 15 UNIT(S): 100 INJECTION, SUSPENSION SUBCUTANEOUS at 13:07

## 2019-12-22 RX ADMIN — Medication 4 MILLILITER(S): at 06:27

## 2019-12-22 RX ADMIN — OXYCODONE HYDROCHLORIDE 5 MILLIGRAM(S): 5 TABLET ORAL at 17:45

## 2019-12-22 RX ADMIN — LEVETIRACETAM 400 MILLIGRAM(S): 250 TABLET, FILM COATED ORAL at 05:41

## 2019-12-22 RX ADMIN — Medication 4 MILLILITER(S): at 11:56

## 2019-12-22 RX ADMIN — POLYETHYLENE GLYCOL 3350 17 GRAM(S): 17 POWDER, FOR SOLUTION ORAL at 12:21

## 2019-12-22 RX ADMIN — LACOSAMIDE 110 MILLIGRAM(S): 50 TABLET ORAL at 18:39

## 2019-12-22 RX ADMIN — CHLORHEXIDINE GLUCONATE 1 APPLICATION(S): 213 SOLUTION TOPICAL at 21:26

## 2019-12-22 RX ADMIN — LEVETIRACETAM 400 MILLIGRAM(S): 250 TABLET, FILM COATED ORAL at 17:08

## 2019-12-22 RX ADMIN — Medication 3 MILLILITER(S): at 06:26

## 2019-12-22 RX ADMIN — CARVEDILOL PHOSPHATE 3.12 MILLIGRAM(S): 80 CAPSULE, EXTENDED RELEASE ORAL at 19:21

## 2019-12-22 RX ADMIN — HEPARIN SODIUM 5000 UNIT(S): 5000 INJECTION INTRAVENOUS; SUBCUTANEOUS at 19:22

## 2019-12-22 RX ADMIN — CLOPIDOGREL BISULFATE 75 MILLIGRAM(S): 75 TABLET, FILM COATED ORAL at 19:22

## 2019-12-22 RX ADMIN — Medication 50 MICROGRAM(S): at 05:42

## 2019-12-22 RX ADMIN — Medication 5 MILLIGRAM(S): at 04:00

## 2019-12-22 RX ADMIN — Medication 6: at 23:43

## 2019-12-22 RX ADMIN — HUMAN INSULIN 15 UNIT(S): 100 INJECTION, SUSPENSION SUBCUTANEOUS at 18:39

## 2019-12-22 RX ADMIN — LACOSAMIDE 110 MILLIGRAM(S): 50 TABLET ORAL at 05:41

## 2019-12-22 RX ADMIN — Medication 3 MILLILITER(S): at 17:10

## 2019-12-22 RX ADMIN — Medication 667 MILLIGRAM(S): at 12:21

## 2019-12-22 RX ADMIN — Medication 3 MILLILITER(S): at 11:56

## 2019-12-22 RX ADMIN — HEPARIN SODIUM 5000 UNIT(S): 5000 INJECTION INTRAVENOUS; SUBCUTANEOUS at 05:42

## 2019-12-22 NOTE — PROGRESS NOTE ADULT - ASSESSMENT
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"  q4h neuro checks  vimpat, Keppra (redose post HD)  PT/OT     CARDS:  CAD, hypotensive  SBP<160, MAP>65 with subacute stroke  levo prn  Echo LV 60-65% (12/15/19)  ASA/plavix for stents  home coreg, hydral, statin  EKG  cards consulted    PULM:  sat > 92%  chest PT  d/c mucomyst    RENAL:  CKD stage V awaiting transplant  nephrology consulted- HD MWF  anuric  On Calcium acetate 667 TID (home med)  bladder scan and str cath q shift    GASTRO:  On TF via NGT  Bowel regimen  add miralax    HEME:  monitor H/H    ---> DVT prophylaxis: SCDs, heparin 5000 q12h    ENDO:  hypothyroid, DMII  cont synthroid  euglycemia  A1c 8.4  15U q6 NPH and SSI    ID:  afebrile  mild leukocytosis    Code status:  Full code  Disposition:  ICU    This patient was at high risk of neurologic deterioration and/or death due to: seizures    Time spent:  40 minutes

## 2019-12-22 NOTE — PROGRESS NOTE ADULT - SUBJECTIVE AND OBJECTIVE BOX
Dr. Barrientos  Office [phone]  Cell [phone]  Karolina HAAS  Cell [phone]      Patient is a 64y old  Male who presents with a chief complaint of Seizures (22 Dec 2019 14:57)      Patient seen and examined at bedside. No chest pain/sob    VITALS:  T(F): 98.2 (12-22-19 @ 15:05), Max: 98.5 (12-22-19 @ 03:00)  HR: 77 (12-22-19 @ 15:05)  BP: 125/59 (12-22-19 @ 15:05)  RR: 16 (12-22-19 @ 15:05)  SpO2: 97% (12-22-19 @ 15:05)  Wt(kg): --    12-21 @ 07:01  -  12-22 @ 07:00  --------------------------------------------------------  IN: 337.5 mL / OUT: 700 mL / NET: -362.5 mL    12-22 @ 07:01  -  12-22 @ 16:32  --------------------------------------------------------  IN: 170 mL / OUT: 0 mL / NET: 170 mL          PHYSICAL EXAM:  Constitutional: NAD  Neck: No JVD  Respiratory: CTAB, no wheezes, rales or rhonchi  Cardiovascular: S1, S2, RRR  Gastrointestinal: BS+, soft, NT/ND  Extremities: No peripheral edema    Hospital Medications:   MEDICATIONS  (STANDING):  albuterol/ipratropium for Nebulization 3 milliLiter(s) Nebulizer every 6 hours  aspirin  chewable 81 milliGRAM(s) Oral daily  atorvastatin 80 milliGRAM(s) Oral at bedtime  calcium acetate 667 milliGRAM(s) Oral three times a day with meals  carvedilol 3.125 milliGRAM(s) Oral every 12 hours  chlorhexidine 4% Liquid 1 Application(s) Topical <User Schedule>  clopidogrel Tablet 75 milliGRAM(s) Oral <User Schedule>  heparin  Injectable 5000 Unit(s) SubCutaneous every 12 hours  insulin lispro (HumaLOG) corrective regimen sliding scale   SubCutaneous every 6 hours  insulin NPH human recombinant 15 Unit(s) SubCutaneous every 6 hours  lacosamide IVPB 50 milliGRAM(s) IV Intermittent every 12 hours  levETIRAcetam  IVPB 250 milliGRAM(s) IV Intermittent every 12 hours  levETIRAcetam  Solution 125 milliGRAM(s) Oral daily  levothyroxine 50 MICROGram(s) Oral daily  polyethylene glycol 3350 17 Gram(s) Oral daily  senna 2 Tablet(s) Oral at bedtime      LABS:  12-22    141  |  98  |  93<H>  ----------------------------<  186<H>  4.4   |  23  |  6.90<H>    Ca    8.9      22 Dec 2019 00:08  Phos  3.3     12-22  Mg     2.6     12-22      Creatinine Trend: 6.90 <--, 5.45 <--, 4.03 <--, 6.92 <--, 5.71 <--, 7.43 <--, 5.53 <--, 6.73 <--    Phosphorus Level, Serum: 3.3 mg/dL (12-22 @ 00:08)                              8.2    16.05 )-----------( 387      ( 22 Dec 2019 00:08 )             26.1     Urine Studies:  Urinalysis - [12-14-19 @ 01:09]      Color Yellow / Appearance Clear / SG 1.005 / pH 7.0      Gluc 50 / Ketone Negative  / Bili Negative / Urobili Negative       Blood Trace / Protein 500 / Leuk Est Negative / Nitrite Negative      RBC 3-5 / WBC 0-2 / Hyaline  / Gran  / Sq Epi  / Non Sq Epi Occasional / Bacteria Few      Iron 26, TIBC 205, %sat 13      [06-04-19 @ 09:27]  Ferritin 99      [06-04-19 @ 09:33]  PTH -- (Ca 6.9)      [06-05-19 @ 08:14]   562  HbA1c 8.4      [12-15-19 @ 11:00]  TSH 0.88      [12-19-19 @ 10:33]  Lipid: chol 134, , HDL 56, LDL 52      [12-15-19 @ 10:56]    HBsAb >1000.0      [12-14-19 @ 23:48]  HBsAg Nonreact      [12-14-19 @ 23:48]  HCV 0.15, Nonreact      [12-14-19 @ 23:48]      RADIOLOGY & ADDITIONAL STUDIES:

## 2019-12-22 NOTE — CHART NOTE - NSCHARTNOTEFT_GEN_A_CORE
Pt known to house cardiology   they will take over care  will sign off  please call with any questions

## 2019-12-22 NOTE — CHART NOTE - NSCHARTNOTEFT_GEN_A_CORE
Attempted to see patient however he was asleep and snoring. Per family and NSCU was more awake and alert this morning.   Currently off vEEG with no signs of epileptic activity on EEG. Remains on Dilantin 50mg BID and Keppra 250mg BID (extra 125mg after HD).   As discussed with NSCU PA, formal consult will follow in 12/23 AM upon re-examination of the patient while awake

## 2019-12-22 NOTE — PROGRESS NOTE ADULT - SUBJECTIVE AND OBJECTIVE BOX
Chief complaint:   Patient is a 64y old  Male who presents with a chief complaint of Seizures (21 Dec 2019 23:34)    HPI:  Patient is a 64 year old male with a history of CAD s/p stents 2009 and 2019, HTN, DM, Carotid stenosis, Hypothyroidism, and ESRD on HD who presented to outside hospital with altered mental status as he was unresponsive, aphasic, and not moving any extremities.  Was treated with tPA and admitted for observation.  Patient noted to have seizure during hospital course and was started on Keppra.  MRI brain was done which showed bilateral supratentorial and infratentorial infarcts.  Patient was undergoing dialysis when he developed A. fib and was intubated for airway protection.  Neurology following and multiple EEGs were done which showed patient in status epilepticus despite multiple antiepilectics.  Patient now transferred to [facility] for further management. (19 Dec 2019 16:37)      Stay Summary:  12/21: extubated    OVERNIGHT EVENTS: secretions improved    ROS: [ x]  Unable to assess due to mental status   All other systems negative    -----------------------------------------------------------------------------------------------------------------------------------------------------------------------------------  ICU Vital Signs Last 24 Hrs  T(C): 36.7 (22 Dec 2019 11:00), Max: 37.2 (21 Dec 2019 15:00)  T(F): 98.1 (22 Dec 2019 11:00), Max: 98.9 (21 Dec 2019 15:00)  HR: 75 (22 Dec 2019 11:00) (75 - 92)  BP: 146/65 (22 Dec 2019 11:00) (127/60 - 170/75)  BP(mean): 90 (22 Dec 2019 11:00) (80 - 123)  ABP: --  ABP(mean): --  RR: 11 (22 Dec 2019 11:00) (11 - 26)  SpO2: 99% (22 Dec 2019 11:00) (95% - 100%)      I&O's Summary    21 Dec 2019 07:01  -  22 Dec 2019 07:00  --------------------------------------------------------  IN: 337.5 mL / OUT: 700 mL / NET: -362.5 mL        MEDICATIONS  (STANDING):  acetylcysteine 20%  Inhalation 4 milliLiter(s) Inhalation every 6 hours  albuterol/ipratropium for Nebulization 3 milliLiter(s) Nebulizer every 6 hours  aspirin  chewable 81 milliGRAM(s) Oral daily  atorvastatin 80 milliGRAM(s) Oral at bedtime  calcium acetate 667 milliGRAM(s) Oral three times a day with meals  carvedilol 3.125 milliGRAM(s) Oral every 12 hours  chlorhexidine 4% Liquid 1 Application(s) Topical <User Schedule>  clopidogrel Tablet 75 milliGRAM(s) Oral <User Schedule>  heparin  Injectable 5000 Unit(s) SubCutaneous every 12 hours  insulin lispro (HumaLOG) corrective regimen sliding scale   SubCutaneous every 6 hours  insulin NPH human recombinant 15 Unit(s) SubCutaneous every 6 hours  lacosamide IVPB 50 milliGRAM(s) IV Intermittent every 12 hours  levETIRAcetam  IVPB 250 milliGRAM(s) IV Intermittent every 12 hours  levETIRAcetam  Solution 125 milliGRAM(s) Oral daily  levothyroxine 50 MICROGram(s) Oral daily  pantoprazole  Injectable 40 milliGRAM(s) IV Push daily  senna 2 Tablet(s) Oral at bedtime      RESPIRATORY:  Mode: CPAP with PS  FiO2: 30  PEEP: 5  PS: 10  MAP: 9    NEUROIMAGING:   Recent imaging studies were reviewed.    LAB RESULTS:                          8.2    16.05 )-----------( 387      ( 22 Dec 2019 00:08 )             26.1           12-22    141  |  98  |  93<H>  ----------------------------<  186<H>  4.4   |  23  |  6.90<H>    Ca    8.9      22 Dec 2019 00:08  Phos  3.3     12-22  Mg     2.6     12-22        ABG - ( 22 Dec 2019 00:00 )  pH, Arterial: 7.45  pH, Blood: x     /  pCO2: 34    /  pO2: 182   / HCO3: 23    / Base Excess: .1    /  SaO2: 99          -----------------------------------------------------------------------------------------------------------------------------------------------------------------------------------    EXAMINATION:  General: calm, extubated  HEENT:  MMM  Neuro:  EO spont, follows commands (wiggles toes), R sided neglect   PERRL 2mm, VOR intact, face symmetric  grimace to nox stim  No movement in bilat upper ext  LLE spont  RLE WD  Cards:  RRR  Respiratory:  no respiratory distress  Adomen:  soft  Extremities:  no edema  Skin:  warm/dry

## 2019-12-22 NOTE — PROGRESS NOTE ADULT - SUBJECTIVE AND OBJECTIVE BOX
Consult:  · Requested by Name:	Dr. Pollard	  · Date/Time:	22-Dec-2019	  · Reason for Referral/Consultation:	pAF; CAD in setting of CVA	  		  · Reason for Admission	Seizures	      · Subjective and Objective: 	  Patient seen and evaluated at bedside    Chief Complaint:  AMS    HPI:  Patient is a 64 year old male with a history of CAD s/p stents 2009 and 2019, HTN, DM, Carotid stenosis, Hypothyroidism, and ESRD on HD who presented to outside hospital with altered mental status as he was unresponsive, aphasic, and not moving any extremities.  Was treated with tPA and admitted for observation.  Patient noted to have seizure during hospital course and was started on Keppra.  MRI brain was done which showed bilateral supratentorial and infratentorial infarcts.  Patient was undergoing dialysis when he developed A. fib and was intubated for airway protection.  Neurology following and multiple EEGs were done which showed patient in status epilepticus despite multiple antiepilectics.  Patient now transferred to [facility] for further managemen    Pt followed by Dr. Bose as an outpatient.  House cardiology to resume care.  Pt still AOx0 however following simple commands.    ===========================  Interval Events  - Some improvement in alertness  - but for the most part, lethargic  - remains in SR   ===========================        PMHx:   Hypothyroidism  CRI (Chronic Renal Insufficiency)  CAD (Coronary Artery Disease)  CAD (Coronary Artery Disease)  Dyslipidemia  Diabetes  Hypertension      PSHx:   History of insertion of stent into coronary artery bypass graft  No significant past surgical history      Allergies:  No Known Allergies      Home Meds: per med rec    Current Medications:   acetylcysteine 20%  Inhalation 4 milliLiter(s) Inhalation every 6 hours  albuterol/ipratropium for Nebulization 3 milliLiter(s) Nebulizer every 6 hours  aspirin  chewable 81 milliGRAM(s) Oral daily  atorvastatin 80 milliGRAM(s) Oral at bedtime  calcium acetate 667 milliGRAM(s) Oral three times a day with meals  carvedilol 3.125 milliGRAM(s) Oral every 12 hours  chlorhexidine 4% Liquid 1 Application(s) Topical <User Schedule>  clopidogrel Tablet 75 milliGRAM(s) Oral <User Schedule>  heparin  Injectable 5000 Unit(s) SubCutaneous every 12 hours  insulin lispro (HumaLOG) corrective regimen sliding scale   SubCutaneous every 6 hours  insulin NPH human recombinant 15 Unit(s) SubCutaneous every 6 hours  lacosamide IVPB 50 milliGRAM(s) IV Intermittent every 12 hours  levETIRAcetam  IVPB 250 milliGRAM(s) IV Intermittent every 12 hours  levETIRAcetam  Solution 125 milliGRAM(s) Oral daily  levothyroxine 50 MICROGram(s) Oral daily  oxyCODONE    IR 5 milliGRAM(s) Oral every 4 hours PRN  oxyCODONE    IR 10 milliGRAM(s) Oral every 4 hours PRN  pantoprazole  Injectable 40 milliGRAM(s) IV Push daily  senna 2 Tablet(s) Oral at bedtime      FAMILY HISTORY:   Father-HTN      Social History:  Smoking History: unknown  Alcohol Use: unknown  Drug Use: unknown    REVIEW OF SYSTEMS:  Constitutional:     [x ] negative [ ] fevers [ ] chills [ ] weight loss [ ] weight gain  HEENT:                  [x ] negative [ ] dry eyes [ ] eye irritation [ ] postnasal drip [ ] nasal congestion  CV:                         [ x] negative  [ ] chest pain [ ] orthopnea [ ] palpitations [ ] murmur  Resp:                     [x ] negative [ ] cough [ ] shortness of breath [ ] dyspnea [ ] wheezing [ ] sputum [ ]hemoptysis  GI:                          [ x] negative [ ] nausea [ ] vomiting [ ] diarrhea [ ] constipation [ ] abd pain [ ] dysphagia   :                        [ x] negative [ ] dysuria [ ] nocturia [ ] hematuria [ ] increased urinary frequency  Musculoskeletal: [x ] negative [ ] back pain [ ] myalgias [ ] arthralgias [ ] fracture  Skin:                       [ x] negative [ ] rash [ ] itch  Neurological:        [ x] negative [ ] headache [ ] dizziness [ ] syncope [ ] weakness [ ] numbness  Psychiatric:           [ x] negative [ ] anxiety [ ] depression  Endocrine:            [ x] negative [ ] diabetes [ ] thyroid problem  Heme/Lymph:      [ x] negative [ ] anemia [ ] bleeding problem  Allergic/Immune: [ x] negative [ ] itchy eyes [ ] nasal discharge [ ] hives [ ] angioedema    [ ] All other systems negative  [x ] Unable to assess ROS due tomental status      Physical Exam:  T(F): 98.3 (12-21), Max: 98.9 (12-21)  HR: 87 (12-21) (78 - 92)  BP: 127/60 (12-21) (127/60 - 184/82)  RR: 22 (12-21)  SpO2: 100% (12-21)  GENERAL: No acute distress, well-developed  HEAD:  Atraumatic, Normocephalic  ENT: EOMI, PERRLA, conjunctiva and sclera clear, Neck supple, No JVD, moist mucosa  CHEST/LUNG: Clear to auscultation bilaterally; No wheeze, equal breath sounds bilaterally   BACK: No spinal tenderness  HEART: Regular rate and rhythm; No murmurs, rubs, or gallops  ABDOMEN: Soft, Nontender, Nondistended; Bowel sounds present  EXTREMITIES:  No clubbing, cyanosis, or edema  PSYCH: Nl behavior, nl affect  NEUROLOGY: AAOx0, moves toes on command, tracks voices  SKIN: Normal color, No rashes or lesions  LINES:    Cardiovascular Diagnostic Testing:    Echo: Personally reviewed: 12/15/19  PHYSICIAN INTERPRETATION:  Left Ventricle:  Global LV systolic function was normal. Left ventricular ejection   fraction, by visual estimation, is 60 to 65%.  Right Ventricle: Normal right ventricular size and function.  Left Atrium: The left atrium is normal in size.  Right Atrium: The right atrium is normal in size.  Mitral Valve: The mitral valve is normal in structure. There is mild   mitral annular calcification. Trace mitral valve regurgitation is seen.  Tricuspid Valve: The tricuspid valve is normal in structure. Mild   tricuspid regurgitation is visualized.  Aortic Valve: The aortic valve is normal. Peak transaortic gradient   equals 10.0 mmHg, mean transaortic gradient equals 5.8 mmHg, the   calculated aortic valve area equals 3.04 cm² by the continuity equation   consistent with normally opening aortic valve. No evidence of aortic   valve regurgitation is seen.  Pulmonic Valve: The pulmonic valve was not well visualized. Trace   pulmonic valve regurgitation.      Stress Testing:     Cath: 6/19  VENTRICLES: Global left ventricular function was normal. EF estimated was  60 %.  CORONARY VESSELS: The coronary circulation is left dominant.  LM:   --  LM: Angiography showed minor luminal irregularities with no flow  limiting lesions.  LAD:   --  Proximal LAD: There was a 80 % stenosis.  CX:   --  Circumflex: Angiography showed moderate atherosclerosis.  --  OM1: There was a 0 % stenosis at the site of a prior stent.    RCA:   --  Proximal RCA: The vessel was very small sized. Angiography  showed severe atherosclerosis.  COMPLICATIONS: There were no complications.  SUMMARY:  CORONARY VESSELS: LM: Angiography showed minor luminal irregularities with  no flow limiting lesions. Proximal LAD: There was a 80 % stenosis.  Circumflex: Angiography showed moderate atherosclerosis. OM1: There was a  0 % stenosis at the site of a prior stent. Proximal RCA: The vessel was  very small sized. Angiography showed severe atherosclerosis.  CARDIAC STRUCTURES: Global left ventricular function was normal. EF  estimated was 60 %.  DIAGNOSTIC RECOMMENDATIONS: OM IFR normal.      Imaging:    CXR: Personally reviewed    Labs: Personally reviewed 12/22                        8.8    11.43 )-----------( 285      ( 20 Dec 2019 20:57 )             27.9     12-21    137  |  96  |  70<H>  ----------------------------<  172<H>  4.5   |  23  |  5.45<H>    Ca    8.8      21 Dec 2019 09:29  Phos  3.4     12-20  Mg     2.1     12-20        Hemoglobin A1C, Whole Blood: 8.4 % (12-15 @ 11:00)    Thyroid Stimulating Hormone, Serum: 0.88 uIU/mL (12-19 @ 10:33)      Assessment and Recommendation:   · Assessment		  64 M hx of CAD s/p 2009, 2019, HTN, DM, carotid stenosis, ESRD on HD p/w AMS found to have new afib and embolic CVA.    #afib  -paroxsymal, currently NSR  -suspected etiology for embolic CVA  -CHADsVAsc > 4, currently not on AC  -Neuro feels should hold off for 2 wks for AC    #CAD  -currently on ASA/Plavix, lipitor, coreg  -depending on bleeding risk of CVA, will consider single antiplatelet therapy w/ AC in 2 wks  -congtinue DAPT for now        Discussed with family at bedside

## 2019-12-22 NOTE — PROGRESS NOTE ADULT - ASSESSMENT
63 yo male with ESRD on HD (M-W-FRI without AVF as awaiting renal transplant expected within 3 months; Dr. Barrientos; [facility]), CAD s/p stent 2009 and CARLA in June 2019, HTN, DM type 2, hypothyroidism and left carotid stenosis admitted for altered mental status during dialysis, he became unresponsive and arm not moving therefore he was sent to hospital. At [facility], he received tPA on 12/13/19. Was noted to have SAH after tPA. Started on 3% saline. Had seizure, despite multiple AEDs remained in status epilepticus and was transferred to [facility] for continuous EEG monitoring. Nephrology is following for ESRD management.    ESRD on HD MWF  - HD consent in the chart  - HD today  - please dose Keppra post HD on HD days        Anemia  - Hgb below goal; unable to use epogen due to CVA  - transfuse PRN , goal Hb >8.0    HTN  - management per NSICU team   - Will avoid hypotension w/ HD

## 2019-12-22 NOTE — PROGRESS NOTE ADULT - SUBJECTIVE AND OBJECTIVE BOX
NSCU ATTENDING -- ADDITIONAL PROGRESS NOTE    Nighttime rounds were performed -- please refer to earlier Progress Note for HPI details.    T(C): 37.2 (12-22-19 @ 19:00), Max: 37.2 (12-22-19 @ 19:00)  HR: 85 (12-22-19 @ 19:00) (70 - 90)  BP: 151/71 (12-22-19 @ 19:00) (125/59 - 176/-)  RR: 18 (12-22-19 @ 19:00) (11 - 23)  SpO2: 98% (12-22-19 @ 19:00) (97% - 100%)  Wt(kg): --    Relevant labwork and imaging reviewed.    Extubated.  Off EEG.  Plan for stroke neuro eval and transfer out of ICU tomorrow tentatively.

## 2019-12-23 LAB
ANION GAP SERPL CALC-SCNC: 15 MMOL/L — SIGNIFICANT CHANGE UP (ref 5–17)
BUN SERPL-MCNC: 46 MG/DL — HIGH (ref 7–23)
CALCIUM SERPL-MCNC: 7.9 MG/DL — LOW (ref 8.4–10.5)
CHLORIDE SERPL-SCNC: 100 MMOL/L — SIGNIFICANT CHANGE UP (ref 96–108)
CO2 SERPL-SCNC: 23 MMOL/L — SIGNIFICANT CHANGE UP (ref 22–31)
CREAT SERPL-MCNC: 3.6 MG/DL — HIGH (ref 0.5–1.3)
CULTURE RESULTS: SIGNIFICANT CHANGE UP
GLUCOSE SERPL-MCNC: 217 MG/DL — HIGH (ref 70–99)
HCT VFR BLD CALC: 27.2 % — LOW (ref 39–50)
HGB BLD-MCNC: 8.6 G/DL — LOW (ref 13–17)
MAGNESIUM SERPL-MCNC: 2.2 MG/DL — SIGNIFICANT CHANGE UP (ref 1.6–2.6)
MCHC RBC-ENTMCNC: 29.1 PG — SIGNIFICANT CHANGE UP (ref 27–34)
MCHC RBC-ENTMCNC: 31.6 GM/DL — LOW (ref 32–36)
MCV RBC AUTO: 91.9 FL — SIGNIFICANT CHANGE UP (ref 80–100)
NRBC # BLD: 0 /100 WBCS — SIGNIFICANT CHANGE UP (ref 0–0)
PHOSPHATE SERPL-MCNC: 3.9 MG/DL — SIGNIFICANT CHANGE UP (ref 2.5–4.5)
PLATELET # BLD AUTO: 341 K/UL — SIGNIFICANT CHANGE UP (ref 150–400)
POTASSIUM SERPL-MCNC: 5.2 MMOL/L — SIGNIFICANT CHANGE UP (ref 3.5–5.3)
POTASSIUM SERPL-SCNC: 5.2 MMOL/L — SIGNIFICANT CHANGE UP (ref 3.5–5.3)
RBC # BLD: 2.96 M/UL — LOW (ref 4.2–5.8)
RBC # FLD: 17.2 % — HIGH (ref 10.3–14.5)
SODIUM SERPL-SCNC: 138 MMOL/L — SIGNIFICANT CHANGE UP (ref 135–145)
SPECIMEN SOURCE: SIGNIFICANT CHANGE UP
WBC # BLD: 12.1 K/UL — HIGH (ref 3.8–10.5)
WBC # FLD AUTO: 12.1 K/UL — HIGH (ref 3.8–10.5)

## 2019-12-23 PROCEDURE — 99291 CRITICAL CARE FIRST HOUR: CPT

## 2019-12-23 PROCEDURE — 71045 X-RAY EXAM CHEST 1 VIEW: CPT | Mod: 26

## 2019-12-23 PROCEDURE — 99233 SBSQ HOSP IP/OBS HIGH 50: CPT | Mod: GC

## 2019-12-23 RX ORDER — HUMAN INSULIN 100 [IU]/ML
18 INJECTION, SUSPENSION SUBCUTANEOUS EVERY 6 HOURS
Refills: 0 | Status: DISCONTINUED | OUTPATIENT
Start: 2019-12-23 | End: 2020-01-02

## 2019-12-23 RX ADMIN — CLOPIDOGREL BISULFATE 75 MILLIGRAM(S): 75 TABLET, FILM COATED ORAL at 17:52

## 2019-12-23 RX ADMIN — Medication 3 MILLILITER(S): at 06:32

## 2019-12-23 RX ADMIN — HUMAN INSULIN 18 UNIT(S): 100 INJECTION, SUSPENSION SUBCUTANEOUS at 05:53

## 2019-12-23 RX ADMIN — CARVEDILOL PHOSPHATE 3.12 MILLIGRAM(S): 80 CAPSULE, EXTENDED RELEASE ORAL at 05:27

## 2019-12-23 RX ADMIN — Medication 2: at 11:58

## 2019-12-23 RX ADMIN — LEVETIRACETAM 400 MILLIGRAM(S): 250 TABLET, FILM COATED ORAL at 05:31

## 2019-12-23 RX ADMIN — LACOSAMIDE 110 MILLIGRAM(S): 50 TABLET ORAL at 17:51

## 2019-12-23 RX ADMIN — LEVETIRACETAM 125 MILLIGRAM(S): 250 TABLET, FILM COATED ORAL at 13:10

## 2019-12-23 RX ADMIN — HUMAN INSULIN 18 UNIT(S): 100 INJECTION, SUSPENSION SUBCUTANEOUS at 18:24

## 2019-12-23 RX ADMIN — POLYETHYLENE GLYCOL 3350 17 GRAM(S): 17 POWDER, FOR SOLUTION ORAL at 12:01

## 2019-12-23 RX ADMIN — Medication 667 MILLIGRAM(S): at 08:57

## 2019-12-23 RX ADMIN — Medication 3 MILLILITER(S): at 18:14

## 2019-12-23 RX ADMIN — HUMAN INSULIN 18 UNIT(S): 100 INJECTION, SUSPENSION SUBCUTANEOUS at 11:59

## 2019-12-23 RX ADMIN — Medication 50 MICROGRAM(S): at 05:27

## 2019-12-23 RX ADMIN — CARVEDILOL PHOSPHATE 3.12 MILLIGRAM(S): 80 CAPSULE, EXTENDED RELEASE ORAL at 17:52

## 2019-12-23 RX ADMIN — Medication 3 MILLILITER(S): at 12:23

## 2019-12-23 RX ADMIN — LEVETIRACETAM 400 MILLIGRAM(S): 250 TABLET, FILM COATED ORAL at 17:52

## 2019-12-23 RX ADMIN — Medication 6: at 18:24

## 2019-12-23 RX ADMIN — LACOSAMIDE 110 MILLIGRAM(S): 50 TABLET ORAL at 05:28

## 2019-12-23 RX ADMIN — Medication 667 MILLIGRAM(S): at 11:58

## 2019-12-23 RX ADMIN — HEPARIN SODIUM 5000 UNIT(S): 5000 INJECTION INTRAVENOUS; SUBCUTANEOUS at 05:27

## 2019-12-23 RX ADMIN — Medication 81 MILLIGRAM(S): at 11:58

## 2019-12-23 RX ADMIN — Medication 667 MILLIGRAM(S): at 17:52

## 2019-12-23 RX ADMIN — HEPARIN SODIUM 5000 UNIT(S): 5000 INJECTION INTRAVENOUS; SUBCUTANEOUS at 17:52

## 2019-12-23 RX ADMIN — Medication 6: at 05:53

## 2019-12-23 NOTE — CONSULT NOTE ADULT - SUBJECTIVE AND OBJECTIVE BOX
HPI:  Patient is a 64 year old male with a history of CAD s/p stents 2009 and 2019, HTN, DM, Carotid stenosis, Hypothyroidism, and ESRD on HD who presented to outside hospital with altered mental status as he was unresponsive, aphasic, and not moving any extremities.  Was treated with tPA and admitted for observation.  Patient noted to have seizure during hospital course and was started on Keppra.  MRI brain was done which showed bilateral supratentorial and infratentorial infarcts.  Patient was undergoing dialysis when he developed A. fib and was intubated for airway protection.  Neurology following and multiple EEGs were done which showed patient in status epilepticus despite multiple antiepilectics.  Patient now transferred to [facility] for further management. (19 Dec 2019 16:37)    PAST MEDICAL & SURGICAL HISTORY:  Hypothyroidism  CRI (Chronic Renal Insufficiency)  CAD (Coronary Artery Disease): with Stents in 06/2009 and 6/2019  Dyslipidemia  Diabetes  Hypertension  History of insertion of stent into coronary artery bypass graft: 2009 and 2019    MEDICATIONS (HOME):  Home Medications:  acetaminophen 160 mg/5 mL oral suspension: 20.31 milliliter(s) orally every 6 hours, As needed, Temp greater or equal to 38C (100.4F), Moderate Pain (4 - 6) (17 Dec 2019 22:59)  amiodarone:  (17 Dec 2019 22:59)  aspirin 81 mg oral tablet, chewable: 1 tab(s) orally once a day (17 Dec 2019 22:59)  atorvastatin 80 mg oral tablet: 1 tab(s) orally once a day (at bedtime) (17 Dec 2019 22:59)  carvedilol 3.125 mg oral tablet: 1 tab(s) orally every 12 hours (17 Dec 2019 22:59)  clopidogrel 75 mg oral tablet: 1 tab(s) orally once a day (17 Dec 2019 22:59)  dexmedetomidine:  (17 Dec 2019 22:59)  heparin: 5000 unit(s) subcutaneous every 12 hours (17 Dec 2019 22:59)  insulin regular 100 units/mL human recombinant injectable solution: 1 unit(s) injectable every 1 hours,   titrate drip based on protocol and hourly  fingersticks (17 Dec 2019 22:59)  lacosamide 200 mg/20 mL intravenous solution: 5 milliliter(s) intravenous every 12 hours (17 Dec 2019 22:59)  levETIRAcetam 1000 mg/100 mL-NaCl 0.75% intravenous solution: 250 milligram(s) by continuous intravenous infusion every 12 hours (17 Dec 2019 22:59)  levothyroxine 50 mcg (0.05 mg) oral tablet: 1 tab(s) orally once a day (11 Jun 2019 18:58)  pantoprazole 40 mg intravenous injection: 40 milligram(s) intravenous once a day (17 Dec 2019 22:59)  PHENobarbital: 100 milligram(s) intravenous every 8 hours (17 Dec 2019 22:59)  piperacillin-tazobactam: 3.375 gram(s) by continuous intravenous infusion every 12 hours (17 Dec 2019 22:59)    MEDICATIONS  (STANDING):  albuterol/ipratropium for Nebulization 3 milliLiter(s) Nebulizer every 6 hours  aspirin  chewable 81 milliGRAM(s) Oral daily  atorvastatin 80 milliGRAM(s) Oral at bedtime  calcium acetate 667 milliGRAM(s) Oral three times a day with meals  carvedilol 3.125 milliGRAM(s) Oral every 12 hours  chlorhexidine 4% Liquid 1 Application(s) Topical <User Schedule>  clopidogrel Tablet 75 milliGRAM(s) Oral <User Schedule>  heparin  Injectable 5000 Unit(s) SubCutaneous every 12 hours  insulin lispro (HumaLOG) corrective regimen sliding scale   SubCutaneous every 6 hours  insulin NPH human recombinant 18 Unit(s) SubCutaneous every 6 hours  lacosamide IVPB 50 milliGRAM(s) IV Intermittent every 12 hours  levETIRAcetam  IVPB 250 milliGRAM(s) IV Intermittent every 12 hours  levETIRAcetam  Solution 125 milliGRAM(s) Oral daily  levothyroxine 50 MICROGram(s) Oral daily  polyethylene glycol 3350 17 Gram(s) Oral daily  senna 2 Tablet(s) Oral at bedtime    ALLERGIES/INTOLERANCES:  Allergies  No Known Allergies      VITALS & EXAMINATION:  Vital Signs Last 24 Hrs  T(C): 38.1 (23 Dec 2019 10:08), Max: 38.1 (23 Dec 2019 05:00)  T(F): 100.5 (23 Dec 2019 10:08), Max: 100.6 (23 Dec 2019 05:00)  HR: 83 (23 Dec 2019 10:08) (70 - 95)  BP: 119/56 (23 Dec 2019 10:08) (113/47 - 176/-)  BP(mean): 81 (23 Dec 2019 10:08) (66 - 99)  RR: 16 (23 Dec 2019 10:08) (11 - 22)  SpO2: 95% (23 Dec 2019 10:08) (95% - 100%)    Neurological Exam:  MS: somnolent, minimally responsive to verbal/tactile stimuli, unable to follow commands. Non-verbal.     LABORATORY:  CBC                       8.6    12.10 )-----------( 341      ( 23 Dec 2019 00:18 )             27.2     Chem 12-23    138  |  100  |  46<H>  ----------------------------<  217<H>  5.2   |  23  |  3.60<H>    Ca    7.9<L>      23 Dec 2019 00:18  Phos  3.9     12-23  Mg     2.2     12-23      Lipid Panel 12-15 Chol 134 LDL 52 HDL 56 Trig 129  A1c 12-15 [id number] 8.4  12-14 [id number] 8.2  12-14 [id number] 8.2    STUDIES & IMAGING:  Studies (EKG, EEG, EMG, etc):     EEG IMPRESSION/CLINICAL CORRELATE  Abnormal EEG study.  1. Independent epileptogenic foci in the left occipital and left frontocentral region, high risk for focal onset seizures.  2. Structural abnormality in the left parasagittal region.  3. Moderate nonspecific diffuse or multifocal cerebral dysfunction.   4. No seizure seen.    Radiology (XR, CT, MR, U/S, TTE/DINAH):    < from: MR Head No Cont (12.15.19 @ 15:09) >  IMPRESSION: Numerous acute diffuse bilateral supratentorial and   infratentorial infarcts.

## 2019-12-23 NOTE — OCCUPATIONAL THERAPY INITIAL EVALUATION ADULT - ADDITIONAL COMMENTS
MRI Head: Numerous acute diffuse bilateral supratentorial and infratentorial infarcts.  CTA NECK: Mild stenosis of the proximal left internal carotid artery of up to 30% based on NASCET criteria. No stenosis of the cervical right carotid artery based on NASCET criteria. Patent cervical vertebral   arteries.  CTA HEAD: No significant stenosis or occlusion of the major proximal arterial branches.

## 2019-12-23 NOTE — PROGRESS NOTE ADULT - ASSESSMENT
63 yo male with ESRD on HD (M-W-FRI without AVF as awaiting renal transplant expected within 3 months; Dr. Barrientos; [facility]), CAD s/p stent 2009 and CARLA in June 2019, HTN, DM type 2, hypothyroidism and left carotid stenosis admitted for altered mental status during dialysis, he became unresponsive and arm not moving therefore he was sent to hospital. At [facility], he received tPA on 12/13/19. Was noted to have SAH after tPA. Started on 3% saline. Had seizure, despite multiple AEDs remained in status epilepticus and was transferred to [facility] for continuous EEG monitoring. Remains in status epilepticus here despite multiple anti-seizure meds. Nephrology is following for ESRD management.    ESRD on HD MWF  - HD consent in the chart  - HD Sunday 12/22/19    - discussed w/ HD RN today who provided care yesterday; per RN, lowest /68, finished at 176/73, removed 1L UF. Per RN, pt complained of headache and primary RN provided medication for pain.    - anticipate next HD will be tomorrow, Tuesday 12/24/19 ([facility] scheduling)  - please dose Keppra post HD on HD days    - consider uptitrating lacosamide and keppra if still in status epilepticus, defer to NSICU team    Anemia  - Hgb below goal; unable to use epogen due to CVA  - transfuse PRN , goal Hb >8.0    HTN  - management per NSICU team   - Will avoid hypotension w/ HD

## 2019-12-23 NOTE — PROGRESS NOTE ADULT - SUBJECTIVE AND OBJECTIVE BOX
SUMMARY:  From notes,   "65 yo M with h/o CAD s/p stent 2009 and abnormal cath 9/2018 with 90% pRCA lesion (not intervened upon) with placement of drug eluting stent everolimus Synergy  3x 28 mm in June 2019, HTN, DM,  L carotid stenosis, hypothyroidism and CKD stage 5  (M-W-FRI  awaiting renal transplant expected within 3 months. Pt presented  to the ER for altered mental status. EMS contacted by wife when pt noted unresponsive, aphasic and not moving extremities; pt Rx'd with TPA. 12/14 pt had CT neck/head; read as SAH. Early am 12/15 pt with Sz and started on Keppra.12/15 pt had MR head: Numerous acute diffuse bilateral supratentorial and infratentorial infarcts. CT/MR head reviewed with radiology. Pt did not have SAH it was contrast from the studies. Poor MS 2 to multiple cerebral infarcts. During HD pt went into A fib which may be the possible cause of the strokes. s/p intubation 12/16 for airway protection"    *Unresponsive during dialysis 12/13 then to ED. SBPs 240s dropped to 140s on cardene in ED.     Per signout, patient was in NCSE whenever stat EEG was checked at OSH (x 3). He was transferred to [facility] for continuous EEG monitoring. Patient was noted to be Keppra 250 iv q12, Vimpat 50 iv q12 and Phenobarb 100 iv q8H, and was transferred on Versed gtt (BP not tolerate propofol gtt)    *** HIGH RISK OF DVT PRESENT ON ADMISSION ***    Overnight Events: Remains neurologically stable.     ROS: Negative unless specified.     VITALS:   T(C): 38.1 (12-23-19 @ 05:00), Max: 38.1 (12-23-19 @ 05:00)  HR: 83 (12-23-19 @ 06:33) (70 - 95)  BP: 125/55 (12-23-19 @ 05:00) (113/47 - 176/-)  RR: 20 (12-23-19 @ 05:00) (11 - 22)  SpO2: 100% (12-23-19 @ 06:33) (97% - 100%)    12-22-19 @ 07:01  -  12-23-19 @ 07:00  --------------------------------------------------------  IN: 1730 mL / OUT: 2150 mL / NET: -420 mL      LABS:  ABG - ( 22 Dec 2019 00:00 )  pH, Arterial: 7.45  pH, Blood: x     /  pCO2: 34    /  pO2: 182   / HCO3: 23    / Base Excess: .1    /  SaO2: 99                          8.6    12.10 )-----------( 341      ( 23 Dec 2019 00:18 )             27.2     12-23    138  |  100  |  46<H>  ----------------------------<  217<H>  5.2   |  23  |  3.60<H>    Ca    7.9<L>      23 Dec 2019 00:18  Phos  3.9     12-23  Mg     2.2     12-23        MEDS:  MEDICATIONS  (STANDING):  albuterol/ipratropium for Nebulization 3 milliLiter(s) Nebulizer every 6 hours  aspirin  chewable 81 milliGRAM(s) Oral daily  atorvastatin 80 milliGRAM(s) Oral at bedtime  calcium acetate 667 milliGRAM(s) Oral three times a day with meals  carvedilol 3.125 milliGRAM(s) Oral every 12 hours  chlorhexidine 4% Liquid 1 Application(s) Topical <User Schedule>  clopidogrel Tablet 75 milliGRAM(s) Oral <User Schedule>  heparin  Injectable 5000 Unit(s) SubCutaneous every 12 hours  insulin lispro (HumaLOG) corrective regimen sliding scale   SubCutaneous every 6 hours  insulin NPH human recombinant 18 Unit(s) SubCutaneous every 6 hours  lacosamide IVPB 50 milliGRAM(s) IV Intermittent every 12 hours  levETIRAcetam  IVPB 250 milliGRAM(s) IV Intermittent every 12 hours  levETIRAcetam  Solution 125 milliGRAM(s) Oral daily  levothyroxine 50 MICROGram(s) Oral daily  polyethylene glycol 3350 17 Gram(s) Oral daily  senna 2 Tablet(s) Oral at bedtime    EXAMINATION:  General:  calm,   HEENT:  MMM  Neuro:  EO spont, follows commands in native language (wiggles toes), R sided neglect   PERRL 2mm, VOR intact, face symmetric  +cough/gag/corneals  grimace to nox stim  No movement in bilat upper ext  LLE spont  RLE WD  Cards:  RRR  Respiratory:  no respiratory distress  Abdomen:  soft  Extremities:  no edema  Skin:  warm/dry SUMMARY:  From notes,   "63 yo M with h/o CAD s/p stent 2009 and abnormal cath 9/2018 with 90% pRCA lesion (not intervened upon) with placement of drug eluting stent everolimus Synergy  3x 28 mm in June 2019, HTN, DM,  L carotid stenosis, hypothyroidism and CKD stage 5  (M-W-FRI  awaiting renal transplant expected within 3 months. Pt presented  to the ER for altered mental status. EMS contacted by wife when pt noted unresponsive, aphasic and not moving extremities; pt Rx'd with TPA. 12/14 pt had CT neck/head; read as SAH. Early am 12/15 pt with Sz and started on Keppra.12/15 pt had MR head: Numerous acute diffuse bilateral supratentorial and infratentorial infarcts. CT/MR head reviewed with radiology. Pt did not have SAH it was contrast from the studies. Poor MS 2 to multiple cerebral infarcts. During HD pt went into A fib which may be the possible cause of the strokes. s/p intubation 12/16 for airway protection"    *Unresponsive during dialysis 12/13 then to ED. SBPs 240s dropped to 140s on cardene in ED.     Per signout, patient was in NCSE whenever stat EEG was checked at OSH (x 3). He was transferred to [facility] for continuous EEG monitoring. Patient was noted to be Keppra 250 iv q12, Vimpat 50 iv q12 and Phenobarb 100 iv q8H, and was transferred on Versed gtt (BP not tolerate propofol gtt)    *** HIGH RISK OF DVT PRESENT ON ADMISSION ***    Overnight Events: Remains neurologically stable.     ROS: Negative unless specified.     VITALS:   T(C): 38.1 (12-23-19 @ 05:00), Max: 38.1 (12-23-19 @ 05:00)  HR: 83 (12-23-19 @ 06:33) (70 - 95)  BP: 125/55 (12-23-19 @ 05:00) (113/47 - 176/-)  RR: 20 (12-23-19 @ 05:00) (11 - 22)- RA   SpO2: 100% (12-23-19 @ 06:33) (97% - 100%)    12-22-19 @ 07:01  -  12-23-19 @ 07:00  --------------------------------------------------------  IN: 1730 mL / OUT: 2150 mL / NET: -420 mL- Straight cath q shift       LABS:  ABG - ( 22 Dec 2019 00:00 )  pH, Arterial: 7.45  pH, Blood: x     /  pCO2: 34    /  pO2: 182   / HCO3: 23    / Base Excess: .1    /  SaO2: 99                          8.6    12.10 )-----------( 341      ( 23 Dec 2019 00:18 )             27.2     12-23    138  |  100  |  46<H>  ----------------------------<  217<H>  5.2   |  23  |  3.60<H>    Ca    7.9<L>      23 Dec 2019 00:18  Phos  3.9     12-23  Mg     2.2     12-23    CAPILLARY BLOOD GLUCOSE      POCT Blood Glucose.: 263 mg/dL (23 Dec 2019 05:45)  POCT Blood Glucose.: 252 mg/dL (22 Dec 2019 23:38)  POCT Blood Glucose.: 135 mg/dL (22 Dec 2019 17:49)  POCT Blood Glucose.: 95 mg/dL (22 Dec 2019 12:24)  POCT Blood Glucose.: 97 mg/dL (22 Dec 2019 09:49)          MEDS:  MEDICATIONS  (STANDING):  albuterol/ipratropium for Nebulization 3 milliLiter(s) Nebulizer every 6 hours  aspirin  chewable 81 milliGRAM(s) Oral daily  atorvastatin 80 milliGRAM(s) Oral at bedtime  calcium acetate 667 milliGRAM(s) Oral three times a day with meals  carvedilol 3.125 milliGRAM(s) Oral every 12 hours  chlorhexidine 4% Liquid 1 Application(s) Topical <User Schedule>  clopidogrel Tablet 75 milliGRAM(s) Oral <User Schedule>  heparin  Injectable 5000 Unit(s) SubCutaneous every 12 hours  insulin lispro (HumaLOG) corrective regimen sliding scale   SubCutaneous every 6 hours  insulin NPH human recombinant 18 Unit(s) SubCutaneous every 6 hours  lacosamide IVPB 50 milliGRAM(s) IV Intermittent every 12 hours  levETIRAcetam  IVPB 250 milliGRAM(s) IV Intermittent every 12 hours  levETIRAcetam  Solution 125 milliGRAM(s) Oral daily  levothyroxine 50 MICROGram(s) Oral daily  polyethylene glycol 3350 17 Gram(s) Oral daily  senna 2 Tablet(s) Oral at bedtime    EXAMINATION:  General:  calm,   HEENT:  MMM  Neuro:  EO spont, follows commands in native language (wiggles toes), R sided neglect   PERRL 2mm, VOR intact, face symmetric  +cough/gag/corneals  grimace to nox stim  No movement in bilat upper ext  LLE spont  RLE WD  Cards:  RRR  Respiratory:  no respiratory distress  Abdomen:  soft  Extremities:  no edema  Skin:  warm/dry SUMMARY:  From notes,   "63 yo M with h/o CAD s/p stent 2009 and abnormal cath 9/2018 with 90% pRCA lesion (not intervened upon) with placement of drug eluting stent everolimus Synergy  3x 28 mm in June 2019, HTN, DM,  L carotid stenosis, hypothyroidism and CKD stage 5  (M-W-FRI  awaiting renal transplant expected within 3 months. Pt presented  to the ER for altered mental status. EMS contacted by wife when pt noted unresponsive, aphasic and not moving extremities; pt Rx'd with TPA. 12/14 pt had CT neck/head; read as SAH. Early am 12/15 pt with Sz and started on Keppra.12/15 pt had MR head: Numerous acute diffuse bilateral supratentorial and infratentorial infarcts. CT/MR head reviewed with radiology. Pt did not have SAH it was contrast from the studies. Poor MS 2 to multiple cerebral infarcts. During HD pt went into A fib which may be the possible cause of the strokes. s/p intubation 12/16 for airway protection"    *Unresponsive during dialysis 12/13 then to ED. SBPs 240s dropped to 140s on cardene in ED.     Per signout, patient was in NCSE whenever stat EEG was checked at OSH (x 3). He was transferred to [facility] for continuous EEG monitoring. Patient was noted to be Keppra 250 iv q12, Vimpat 50 iv q12 and Phenobarb 100 iv q8H, and was transferred on Versed gtt (BP not tolerate propofol gtt)    *** HIGH RISK OF DVT PRESENT ON ADMISSION ***    Overnight Events: Remains neurologically stable.     ROS: Negative unless specified.     VITALS:   T(C): 38.1 (12-23-19 @ 05:00), Max: 38.1 (12-23-19 @ 05:00)  HR: 83 (12-23-19 @ 06:33) (70 - 95)  BP: 125/55 (12-23-19 @ 05:00) (113/47 - 176/-)  RR: 20 (12-23-19 @ 05:00) (11 - 22)- RA   SpO2: 100% (12-23-19 @ 06:33) (97% - 100%)    12-22-19 @ 07:01  -  12-23-19 @ 07:00  --------------------------------------------------------  IN: 1730 mL / OUT: 2150 mL / NET: -420 mL- Straight cath q shift       LABS:  ABG - ( 22 Dec 2019 00:00 )  pH, Arterial: 7.45  pH, Blood: x     /  pCO2: 34    /  pO2: 182   / HCO3: 23    / Base Excess: .1    /  SaO2: 99                          8.6    12.10 )-----------( 341      ( 23 Dec 2019 00:18 )             27.2     12-23    138  |  100  |  46<H>  ----------------------------<  217<H>  5.2   |  23  |  3.60<H>    Ca    7.9<L>      23 Dec 2019 00:18  Phos  3.9     12-23  Mg     2.2     12-23    CAPILLARY BLOOD GLUCOSE      POCT Blood Glucose.: 263 mg/dL (23 Dec 2019 05:45)  POCT Blood Glucose.: 252 mg/dL (22 Dec 2019 23:38)  POCT Blood Glucose.: 135 mg/dL (22 Dec 2019 17:49)  POCT Blood Glucose.: 95 mg/dL (22 Dec 2019 12:24)  POCT Blood Glucose.: 97 mg/dL (22 Dec 2019 09:49)          MEDS:  MEDICATIONS  (STANDING):  albuterol/ipratropium for Nebulization 3 milliLiter(s) Nebulizer every 6 hours  aspirin  chewable 81 milliGRAM(s) Oral daily  atorvastatin 80 milliGRAM(s) Oral at bedtime  calcium acetate 667 milliGRAM(s) Oral three times a day with meals  carvedilol 3.125 milliGRAM(s) Oral every 12 hours  chlorhexidine 4% Liquid 1 Application(s) Topical <User Schedule>  clopidogrel Tablet 75 milliGRAM(s) Oral <User Schedule>  heparin  Injectable 5000 Unit(s) SubCutaneous every 12 hours  insulin lispro (HumaLOG) corrective regimen sliding scale   SubCutaneous every 6 hours  insulin NPH human recombinant 18 Unit(s) SubCutaneous every 6 hours  lacosamide IVPB 50 milliGRAM(s) IV Intermittent every 12 hours  levETIRAcetam  IVPB 250 milliGRAM(s) IV Intermittent every 12 hours  levETIRAcetam  Solution 125 milliGRAM(s) Oral daily  levothyroxine 50 MICROGram(s) Oral daily  polyethylene glycol 3350 17 Gram(s) Oral daily  senna 2 Tablet(s) Oral at bedtime    EXAMINATION:  General:  calm,   HEENT:  MMM  Neuro:  EO spont,  R sided neglect Oriented to location only  PERRL 2mm, VOR intact, face symmetric  No movement in bilat upper ext  FC b/l LE (L > R); bent L knee, moved feet on R  Cards:  RRR  Respiratory: CTAB. Limited exam  Abdomen:  soft  Extremities:  no edema  Skin:  warm/dry

## 2019-12-23 NOTE — OCCUPATIONAL THERAPY INITIAL EVALUATION ADULT - PRECAUTIONS/LIMITATIONS, REHAB EVAL
Patient was undergoing dialysis when he developed A. fib and was intubated for airway protection.  Neurology following and multiple EEGs were done which showed patient in status epilepticus despite multiple antiepilectics.  Patient now transferred to [facility] for further management. fall precautions/Patient was undergoing dialysis when he developed A. fib and was intubated for airway protection.  Neurology following and multiple EEGs were done which showed patient in status epilepticus despite multiple antiepilectics.  Patient now transferred to [facility] for further management.

## 2019-12-23 NOTE — PHYSICAL THERAPY INITIAL EVALUATION ADULT - PRECAUTIONS/LIMITATIONS, REHAB EVAL
MRI brain showed bl supratentorial & infratentorial infarcts.  Pt was undergoing dialysis when he developed A.fib and was intubated for airway protection.  Neurology following and multiple EEGs were done which showed pt in status epilepticus despite multiple antiepilectics.  Pt now transferred to [facility] for further management. Hospital course: status epilepticus: now resolved. 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 Pt extubated 12/21, Echo LV 60-65% (12/15/19), CKD stage V awaiting transplant. per nephrology this PM, pt was not following commands as he was over the weekend, HD on 12/22. Per neuro, 12/23 somnolent minimally responsive to verbal/tactile stimuli, unable to follow commands. MRI brain showed bl supratentorial & infratentorial infarcts.  Pt was undergoing dialysis when he developed A.fib and was intubated for airway protection.  Neurology following and multiple EEGs were done which showed pt in status epilepticus despite multiple antiepilectics.  Pt now transferred to [facility] for further management. Hospital course: status epilepticus: now resolved. 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 Pt extubated 12/21, Echo LV 60-65% (12/15/19), CKD stage V awaiting transplant. per nephrology this PM, pt was not following commands as he was over the weekend, HD on 12/22. Per neuro, 12/23 somnolent minimally responsive to verbal/tactile stimuli, unable to follow commands./fall precautions

## 2019-12-23 NOTE — PROGRESS NOTE ADULT - SUBJECTIVE AND OBJECTIVE BOX
[facility]   MD Yokasta Gary D.O. Fatima Sheikh, D.O. Ruoru Wong, PA    From 7 AM - 5 PM:  OFFICE: [phone]  Dr. Barrientos cell: [phone]  Dr. Lopes cell: [phone]  Dr. Witt cell: [phone]  WALDO Valentin cell: [phone]    From 5 PM - 7 AM: Answering Service: [phone]        RENAL FOLLOW UP NOTE  --------------------------------------------------------------------------------  HPI: Pt seen and examined. Extubated this weekend. Per son at bedside, was awake earlier today but is now very sleepy. Not following commands as he was over the weekend, per son. Had HD yesterday in which he complained of headache per son.        PAST HISTORY  --------------------------------------------------------------------------------  No significant changes to PMH, PSH, FHx, SHx, unless otherwise noted    ALLERGIES & MEDICATIONS  --------------------------------------------------------------------------------  Allergies    No Known Allergies    Intolerances      Standing Inpatient Medications  albuterol/ipratropium for Nebulization 3 milliLiter(s) Nebulizer every 6 hours  aspirin  chewable 81 milliGRAM(s) Oral daily  atorvastatin 80 milliGRAM(s) Oral at bedtime  calcium acetate 667 milliGRAM(s) Oral three times a day with meals  carvedilol 3.125 milliGRAM(s) Oral every 12 hours  chlorhexidine 4% Liquid 1 Application(s) Topical <User Schedule>  clopidogrel Tablet 75 milliGRAM(s) Oral <User Schedule>  heparin  Injectable 5000 Unit(s) SubCutaneous every 12 hours  insulin lispro (HumaLOG) corrective regimen sliding scale   SubCutaneous every 6 hours  insulin NPH human recombinant 18 Unit(s) SubCutaneous every 6 hours  lacosamide IVPB 50 milliGRAM(s) IV Intermittent every 12 hours  levETIRAcetam  IVPB 250 milliGRAM(s) IV Intermittent every 12 hours  levETIRAcetam  Solution 125 milliGRAM(s) Oral daily  levothyroxine 50 MICROGram(s) Oral daily  polyethylene glycol 3350 17 Gram(s) Oral daily  senna 2 Tablet(s) Oral at bedtime    PRN Inpatient Medications      REVIEW OF SYSTEMS  --------------------------------------------------------------------------------  unable to obtain    VITALS/PHYSICAL EXAM  --------------------------------------------------------------------------------  T(C): 38.1 (12-23-19 @ 10:08), Max: 38.1 (12-23-19 @ 05:00)  HR: 86 (12-23-19 @ 12:24) (70 - 95)  BP: 143/65 (12-23-19 @ 12:00) (113/47 - 176/-)  RR: 22 (12-23-19 @ 12:00) (12 - 22)  SpO2: 100% (12-23-19 @ 12:24) (95% - 100%)  Wt(kg): --        12-22-19 @ 07:01  -  12-23-19 @ 07:00  --------------------------------------------------------  IN: 1795 mL / OUT: 2150 mL / NET: -355 mL    12-23-19 @ 07:01  -  12-23-19 @ 12:42  --------------------------------------------------------  IN: 425 mL / OUT: 0 mL / NET: 425 mL      Physical Exam:  	Gen: NAD  	HEENT: MMM  	Pulm: CTA B/L  	CV: S1S2  	Abd: Soft, +BS  	Ext: No LE edema B/L              Neuro: somnolent, minimally arousable w/ physical stimuli  	Skin: Warm and Dry   	Vascular access: [facility] in place, dressing c/d/i    LABS/STUDIES  --------------------------------------------------------------------------------              8.6    12.10 >-----------<  341      [12-23-19 @ 00:18]              27.2     138  |  100  |  46  ----------------------------<  217      [12-23-19 @ 00:18]  5.2   |  23  |  3.60        Ca     7.9     [12-23-19 @ 00:18]      Mg     2.2     [12-23-19 @ 00:18]      Phos  3.9     [12-23-19 @ 00:18]            Creatinine Trend:  SCr 3.60 [12-23 @ 00:18]  SCr 6.90 [12-22 @ 00:08]  SCr 5.45 [12-21 @ 09:29]  SCr 4.03 [12-20 @ 20:57]  SCr 6.92 [12-19 @ 22:48]    Urinalysis - [12-14-19 @ 01:09]      Color Yellow / Appearance Clear / SG 1.005 / pH 7.0      Gluc 50 / Ketone Negative  / Bili Negative / Urobili Negative       Blood Trace / Protein 500 / Leuk Est Negative / Nitrite Negative      RBC 3-5 / WBC 0-2 / Hyaline  / Gran  / Sq Epi  / Non Sq Epi Occasional / Bacteria Few      Iron 26, TIBC 205, %sat 13      [06-04-19 @ 09:27]  Ferritin 99      [06-04-19 @ 09:33]  PTH -- (Ca 6.9)      [06-05-19 @ 08:14]   562  HbA1c 8.4      [12-15-19 @ 11:00]  TSH 0.88      [12-19-19 @ 10:33]  Lipid: chol 134, , HDL 56, LDL 52      [12-15-19 @ 10:56]    HBsAb >1000.0      [12-14-19 @ 23:48]  HBsAg Nonreact      [12-14-19 @ 23:48]  HCV 0.15, Nonreact      [12-14-19 @ 23:48]

## 2019-12-23 NOTE — PROGRESS NOTE ADULT - ASSESSMENT
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"  q4h neuro checks  vimpat, Keppra (redose post HD)  OOB/PT/OT    CARDS:  SBP<160, MAP>65 with subacute stroke  Echo LV 60-65% (12/15/19)  CAD - ASA/plavix, statin, Coreg 3.125 BID    PULM:  sat > 92%  Extubated 12/21  chest PT    RENAL:  CKD stage V awaiting transplant  nephrology consulted- HD MWF  anuric  On Calcium acetate 667 TID (home med)  baseline bladder scan    GASTRO:  On Neproporth  Bowel regimen  ---> Stress ulcer prophylaxis: Not indicated    HEME:  monitor H/H    ---> DVT prophylaxis: SCDs, heparin 5000 q12h    ENDO:  hypothyroid, DMII  cont synthroid  euglycemia  On NPH 18q6 and SSI    ID:  afebrile  mild leukocytosis    Code status:  Full code  Disposition: Stroke unit    This patient was at high risk of neurologic deterioration and/or death due to: seizures    Time spent:  45 minutes Summary: 65 yo M with h/o CAD s/p stent 2009 and abnormal cath 9/2018 with 90% pRCA lesion not intervened upon) with placement of drug eluting stent everolimus Synergy  3x 28 mm in June 2019, HTN, DM,  L carotid stenosis, hypothyroidism and CKD stage 5  (M-W-FRI  awaiting renal transplant expected within 3 months presented after unresponsiveness at HD, now s/p tpa and found to be in status epilepticus at OSH, now resolved.  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     NEURO:  q4h neuro checks  vimpat, Keppra (redose post HD)  Carotid dopplers for stroke workup  Stroke Neurology consult  MRI/MRA Brain if able  OOB/PT/OT    PULM:  sat > 92%  Extubated 12/21  Incentive spirometry, if able    CARDS:   - 160  Echo LV 60-65% (12/15/19)  CAD - ASA/plavix, statin, Coreg 3.125 BID    RENAL:  CKD stage V awaiting transplant  nephrology consulted- HD MWF  Was dialyzed 12/22 (due to holiday schedule)  On Calcium acetate 667 TID (home med)  baseline bladder scan    GASTRO:  On Neproporth  Bowel regimen  ---> Stress ulcer prophylaxis: Not indicated    HEME:  monitor H/H    ---> DVT prophylaxis: SCDs, heparin 5000 q12h    ENDO:  hypothyroid, DMII  cont synthroid  euglycemia  On NPH 18q6 and SSI    ID: Monitor for fevers    Code status:  Full code  Disposition: Stroke unit     This patient was at high risk of neurologic deterioration and/or death due to: seizures    Time spent:  40 minutes

## 2019-12-23 NOTE — OCCUPATIONAL THERAPY INITIAL EVALUATION ADULT - ORIENTATION, REHAB EVAL
pt able to answer questions in English/time/person/place pt able to answer questions in English, however command follow intermittent/place/person/time

## 2019-12-23 NOTE — PROGRESS NOTE ADULT - SUBJECTIVE AND OBJECTIVE BOX
NSCU ATTENDING -- ADDITIONAL PROGRESS NOTE    Nighttime rounds were performed -- please refer to earlier Progress Note for HPI details.    T(C): 38 (12-23-19 @ 19:00), Max: 38.1 (12-23-19 @ 05:00)  HR: 93 (12-23-19 @ 23:00) (83 - 98)  BP: 144/66 (12-23-19 @ 23:00) (106/51 - 162/72)  RR: 21 (12-23-19 @ 23:00) (16 - 26)  SpO2: 96% (12-23-19 @ 23:00) (95% - 100%)  Wt(kg): --    Relevant labwork and imaging reviewed.    Lethargic but arousable.  No movement b/l UE.  + secretions but cough intact.  Made NPO 2/2 concern that NG tube may not be in place but confirmed now via portable CXR.  pending neuro transfer.

## 2019-12-23 NOTE — CONSULT NOTE ADULT - ASSESSMENT
Neurology following and multiple EEGs were done which showed patient in status epilepticus despite multiple anti-epileptics.    Plan:   - as per NSCU team   - Neurology will continue to follow

## 2019-12-23 NOTE — PHYSICAL THERAPY INITIAL EVALUATION ADULT - IMPAIRED TRANSFERS: SIT/STAND, REHAB EVAL
impaired postural control/decreased strength/impaired motor control/cognition/decr endurance/impaired balance/abnormal muscle tone

## 2019-12-23 NOTE — PROGRESS NOTE ADULT - SUBJECTIVE AND OBJECTIVE BOX
Patient seen and examined at bedside.    Overnight Events:   No overnight events     REVIEW OF SYSTEMS:  Constitutional:     [ ] negative [ ] fevers [ ] chills [ ] weight loss [ ] weight gain  HEENT:                  [ ] negative [ ] dry eyes [ ] eye irritation [ ] postnasal drip [ ] nasal congestion  CV:                         [ ] negative  [ ] chest pain [ ] orthopnea [ ] palpitations [ ] murmur  Resp:                     [ ] negative [ ] cough [ ] shortness of breath [ ] dyspnea [ ] wheezing [ ] sputum [ ]hemoptysis  GI:                          [ ] negative [ ] nausea [ ] vomiting [ ] diarrhea [ ] constipation [ ] abd pain [ ] dysphagia   :                        [ ] negative [ ] dysuria [ ] nocturia [ ] hematuria [ ] increased urinary frequency  Musculoskeletal: [ ] negative [ ] back pain [ ] myalgias [ ] arthralgias [ ] fracture  Skin:                       [ ] negative [ ] rash [ ] itch  Neurological:        [ ] negative [ ] headache [ ] dizziness [ ] syncope [ ] weakness [ ] numbness  Psychiatric:           [ ] negative [ ] anxiety [ ] depression  Endocrine:            [ ] negative [ ] diabetes [ ] thyroid problem  Heme/Lymph:      [ ] negative [ ] anemia [ ] bleeding problem  Allergic/Immune: [ ] negative [ ] itchy eyes [ ] nasal discharge [ ] hives [ ] angioedema    [ ] All other systems negative  [x] Unable to assess ROS due to AMS    Current Meds:  albuterol/ipratropium for Nebulization 3 milliLiter(s) Nebulizer every 6 hours  aspirin  chewable 81 milliGRAM(s) Oral daily  atorvastatin 80 milliGRAM(s) Oral at bedtime  calcium acetate 667 milliGRAM(s) Oral three times a day with meals  carvedilol 3.125 milliGRAM(s) Oral every 12 hours  chlorhexidine 4% Liquid 1 Application(s) Topical <User Schedule>  clopidogrel Tablet 75 milliGRAM(s) Oral <User Schedule>  heparin  Injectable 5000 Unit(s) SubCutaneous every 12 hours  insulin lispro (HumaLOG) corrective regimen sliding scale   SubCutaneous every 6 hours  insulin NPH human recombinant 18 Unit(s) SubCutaneous every 6 hours  lacosamide IVPB 50 milliGRAM(s) IV Intermittent every 12 hours  levETIRAcetam  IVPB 250 milliGRAM(s) IV Intermittent every 12 hours  levETIRAcetam  Solution 125 milliGRAM(s) Oral daily  levothyroxine 50 MICROGram(s) Oral daily  polyethylene glycol 3350 17 Gram(s) Oral daily  senna 2 Tablet(s) Oral at bedtime      PAST MEDICAL & SURGICAL HISTORY:  Hypothyroidism  CRI (Chronic Renal Insufficiency)  CAD (Coronary Artery Disease): with Stents in 06/2009 and 6/2019  Dyslipidemia  Diabetes  Hypertension  History of insertion of stent into coronary artery bypass graft: 2009 and 2019      Vitals:  T(F): 100.5 (12-23), Max: 100.6 (12-23)  HR: 86 (12-23) (70 - 95)  BP: 143/65 (12-23) (113/47 - 176/-)  RR: 22 (12-23)  SpO2: 100% (12-23)  I&O's Summary    22 Dec 2019 07:01  -  23 Dec 2019 07:00  --------------------------------------------------------  IN: 1795 mL / OUT: 2150 mL / NET: -355 mL    23 Dec 2019 07:01  -  23 Dec 2019 13:05  --------------------------------------------------------  IN: 425 mL / OUT: 0 mL / NET: 425 mL        Physical Exam:  Appearance: Comfortable no acute distress  Eyes: pink conjunctiva  HENT: dry oral mucosa, ng tube in right nares   Cardiovascular: RRR, S1, S2, no murmurs, rubs, or gallops; no edema; no JVD  Respiratory: Clear to auscultation bilaterally  Gastrointestinal: soft, non-tender, non-distended with normal bowel sounds  Musculoskeletal: No clubbing; no joint deformity   Neurologic: AAOx0, moves toes on command, tracks voices  Lymphatic: No lymphadenopathy  Skin: No rashes, ecchymoses, or cyanosis                            8.6    12.10 )-----------( 341      ( 23 Dec 2019 00:18 )             27.2     12-23    138  |  100  |  46<H>  ----------------------------<  217<H>  5.2   |  23  |  3.60<H>    Ca    7.9<L>      23 Dec 2019 00:18  Phos  3.9     12-23  Mg     2.2     12-23                    New ECG(s): Personally reviewed    Echo:    < from: TTE Echo Doppler w/o Cont (12.15.19 @ 08:32) >  Summary:   1. Left ventricular ejection fraction, by visual estimation, is 60 to   65%.   2. Normal global left ventricular systolic function.   3. Mild mitralannular calcification.   4. Trace mitral valve regurgitation.      < end of copied text >      Stress Testing:     Cath:    Imaging:    Interpretation of Telemetry:  Sinus rhythm

## 2019-12-23 NOTE — PHYSICAL THERAPY INITIAL EVALUATION ADULT - IMPAIRMENTS CONTRIBUTING IMPAIRED BED MOBILITY, REHAB EVAL
decreased strength/impaired postural control/impaired balance/impaired coordination/abnormal muscle tone/cognition/impaired motor control

## 2019-12-23 NOTE — OCCUPATIONAL THERAPY INITIAL EVALUATION ADULT - PERTINENT HX OF CURRENT PROBLEM, REHAB EVAL
63 yo M with a history of CAD s/p stents 2009 and 2019, HTN, DM, Carotid stenosis, Hypothyroidism, and ESRD on HD who presented to outside hospital with altered mental status as he was unresponsive, aphasic, and not moving any extremities.  Was treated with tPA and admitted for observation.  Patient noted to have seizure during hospital course and was started on Keppra.  MRI brain was done which showed bilateral supratentorial and infratentorial infarcts

## 2019-12-23 NOTE — PROGRESS NOTE ADULT - ASSESSMENT
64 M hx of CAD s/p 2009, 2019, HTN, DM, carotid stenosis, ESRD on HD p/w AMS found to have new afib and embolic CVA.    #afib  -paroxsymal, currently NSR  -suspected etiology for embolic CVA  -CHADsVAsc > 4, currently not on AC  -Neuro feels should hold off for 2 wks for AC  c/w carvedilol 3.215mg BID    #CAD  -currently on ASA/Plavix  - c/w atorvastain 80mg daily  -depending on bleeding risk of CVA, will consider single antiplatelet therapy w/ AC in 2 wks  continue DAPT for now

## 2019-12-23 NOTE — PHYSICAL THERAPY INITIAL EVALUATION ADULT - GENERAL OBSERVATIONS, REHAB EVAL
Pt received supine in bed, +NGT, +nasal trumpt, +b/l venodynes, +BP cuff, +tele, +pulse ox. Pt is William speaking but responding to basic English initially- +wax/wanes, intermittent command follow-A&Ox3 (self, place, year), simple commands ~50% of the time. PROM BUe/BLE, spontaneously moving LLE.

## 2019-12-23 NOTE — PHYSICAL THERAPY INITIAL EVALUATION ADULT - PERTINENT HX OF CURRENT PROBLEM, REHAB EVAL
Pt is a 64 year old male admitted to [facility] on 12/19/19 with a hx of CAD s/p stents 2009 and 2019, HTN, DM, Carotid stenosis, Hypothyroidism, and ESRD on HD who presented to outside hospital with AMS as he was unresponsive, aphasic, and not moving any extremities.  Was treated with tPA and admitted for observation.  Pt noted to have seizure during hospital course, started on Keppra.

## 2019-12-24 LAB
ALBUMIN SERPL ELPH-MCNC: 2.7 G/DL — LOW (ref 3.3–5)
ALP SERPL-CCNC: 59 U/L — SIGNIFICANT CHANGE UP (ref 40–120)
ALT FLD-CCNC: 16 U/L — SIGNIFICANT CHANGE UP (ref 10–45)
ANION GAP SERPL CALC-SCNC: 18 MMOL/L — HIGH (ref 5–17)
APPEARANCE UR: CLEAR — SIGNIFICANT CHANGE UP
AST SERPL-CCNC: 21 U/L — SIGNIFICANT CHANGE UP (ref 10–40)
BILIRUB DIRECT SERPL-MCNC: <0.1 MG/DL — SIGNIFICANT CHANGE UP (ref 0–0.2)
BILIRUB INDIRECT FLD-MCNC: >0.2 MG/DL — SIGNIFICANT CHANGE UP (ref 0.2–1)
BILIRUB SERPL-MCNC: 0.3 MG/DL — SIGNIFICANT CHANGE UP (ref 0.2–1.2)
BILIRUB UR-MCNC: NEGATIVE — SIGNIFICANT CHANGE UP
BUN SERPL-MCNC: 92 MG/DL — HIGH (ref 7–23)
CALCIUM SERPL-MCNC: 8.6 MG/DL — SIGNIFICANT CHANGE UP (ref 8.4–10.5)
CHLORIDE SERPL-SCNC: 96 MMOL/L — SIGNIFICANT CHANGE UP (ref 96–108)
CO2 SERPL-SCNC: 23 MMOL/L — SIGNIFICANT CHANGE UP (ref 22–31)
COLOR SPEC: YELLOW — SIGNIFICANT CHANGE UP
CREAT SERPL-MCNC: 7 MG/DL — HIGH (ref 0.5–1.3)
DIFF PNL FLD: ABNORMAL
FLU A RESULT: SIGNIFICANT CHANGE UP
FLU A RESULT: SIGNIFICANT CHANGE UP
FLUAV AG NPH QL: SIGNIFICANT CHANGE UP
FLUBV AG NPH QL: SIGNIFICANT CHANGE UP
GAS PNL BLDA: SIGNIFICANT CHANGE UP
GLUCOSE SERPL-MCNC: 114 MG/DL — HIGH (ref 70–99)
GLUCOSE UR QL: ABNORMAL
GRAM STN FLD: SIGNIFICANT CHANGE UP
HCT VFR BLD CALC: 25.3 % — LOW (ref 39–50)
HGB BLD-MCNC: 8.2 G/DL — LOW (ref 13–17)
KETONES UR-MCNC: NEGATIVE — SIGNIFICANT CHANGE UP
LEUKOCYTE ESTERASE UR-ACNC: NEGATIVE — SIGNIFICANT CHANGE UP
LIDOCAIN IGE QN: 26 U/L — SIGNIFICANT CHANGE UP (ref 7–60)
MAGNESIUM SERPL-MCNC: 2.7 MG/DL — HIGH (ref 1.6–2.6)
MCHC RBC-ENTMCNC: 29.1 PG — SIGNIFICANT CHANGE UP (ref 27–34)
MCHC RBC-ENTMCNC: 32.4 GM/DL — SIGNIFICANT CHANGE UP (ref 32–36)
MCV RBC AUTO: 89.7 FL — SIGNIFICANT CHANGE UP (ref 80–100)
MRSA PCR RESULT.: SIGNIFICANT CHANGE UP
NITRITE UR-MCNC: NEGATIVE — SIGNIFICANT CHANGE UP
NRBC # BLD: 0 /100 WBCS — SIGNIFICANT CHANGE UP (ref 0–0)
PH UR: 6.5 — SIGNIFICANT CHANGE UP (ref 5–8)
PHOSPHATE SERPL-MCNC: 3.8 MG/DL — SIGNIFICANT CHANGE UP (ref 2.5–4.5)
PLATELET # BLD AUTO: 459 K/UL — HIGH (ref 150–400)
POTASSIUM SERPL-MCNC: 4.3 MMOL/L — SIGNIFICANT CHANGE UP (ref 3.5–5.3)
POTASSIUM SERPL-SCNC: 4.3 MMOL/L — SIGNIFICANT CHANGE UP (ref 3.5–5.3)
PROT SERPL-MCNC: 7 G/DL — SIGNIFICANT CHANGE UP (ref 6–8.3)
PROT UR-MCNC: ABNORMAL
RBC # BLD: 2.82 M/UL — LOW (ref 4.2–5.8)
RBC # FLD: 16.7 % — HIGH (ref 10.3–14.5)
RSV RESULT: SIGNIFICANT CHANGE UP
RSV RNA RESP QL NAA+PROBE: SIGNIFICANT CHANGE UP
S AUREUS DNA NOSE QL NAA+PROBE: DETECTED
SODIUM SERPL-SCNC: 137 MMOL/L — SIGNIFICANT CHANGE UP (ref 135–145)
SP GR SPEC: 1.02 — SIGNIFICANT CHANGE UP (ref 1.01–1.02)
SPECIMEN SOURCE: SIGNIFICANT CHANGE UP
UROBILINOGEN FLD QL: NEGATIVE — SIGNIFICANT CHANGE UP
WBC # BLD: 18.4 K/UL — HIGH (ref 3.8–10.5)
WBC # FLD AUTO: 18.4 K/UL — HIGH (ref 3.8–10.5)

## 2019-12-24 PROCEDURE — 70450 CT HEAD/BRAIN W/O DYE: CPT | Mod: 26

## 2019-12-24 PROCEDURE — 99233 SBSQ HOSP IP/OBS HIGH 50: CPT | Mod: GC

## 2019-12-24 PROCEDURE — 93880 EXTRACRANIAL BILAT STUDY: CPT | Mod: 26

## 2019-12-24 PROCEDURE — 74177 CT ABD & PELVIS W/CONTRAST: CPT | Mod: 26

## 2019-12-24 PROCEDURE — 99291 CRITICAL CARE FIRST HOUR: CPT

## 2019-12-24 PROCEDURE — 71260 CT THORAX DX C+: CPT | Mod: 26

## 2019-12-24 RX ORDER — HUMAN INSULIN 100 [IU]/ML
9 INJECTION, SUSPENSION SUBCUTANEOUS ONCE
Refills: 0 | Status: COMPLETED | OUTPATIENT
Start: 2019-12-24 | End: 2019-12-24

## 2019-12-24 RX ORDER — ACETAMINOPHEN 500 MG
650 TABLET ORAL EVERY 6 HOURS
Refills: 0 | Status: DISCONTINUED | OUTPATIENT
Start: 2019-12-24 | End: 2019-12-27

## 2019-12-24 RX ORDER — HUMAN INSULIN 100 [IU]/ML
9 INJECTION, SUSPENSION SUBCUTANEOUS ONCE
Refills: 0 | Status: COMPLETED | OUTPATIENT
Start: 2019-12-23 | End: 2019-12-24

## 2019-12-24 RX ORDER — SODIUM CHLORIDE 9 MG/ML
1000 INJECTION INTRAMUSCULAR; INTRAVENOUS; SUBCUTANEOUS
Refills: 0 | Status: DISCONTINUED | OUTPATIENT
Start: 2019-12-23 | End: 2019-12-25

## 2019-12-24 RX ORDER — ACETAMINOPHEN 500 MG
1000 TABLET ORAL ONCE
Refills: 0 | Status: COMPLETED | OUTPATIENT
Start: 2019-12-23 | End: 2019-12-24

## 2019-12-24 RX ORDER — SODIUM CHLORIDE 9 MG/ML
500 INJECTION INTRAMUSCULAR; INTRAVENOUS; SUBCUTANEOUS ONCE
Refills: 0 | Status: COMPLETED | OUTPATIENT
Start: 2019-12-24 | End: 2019-12-24

## 2019-12-24 RX ADMIN — Medication 400 MILLIGRAM(S): at 00:13

## 2019-12-24 RX ADMIN — SODIUM CHLORIDE 50 MILLILITER(S): 9 INJECTION INTRAMUSCULAR; INTRAVENOUS; SUBCUTANEOUS at 17:15

## 2019-12-24 RX ADMIN — LACOSAMIDE 110 MILLIGRAM(S): 50 TABLET ORAL at 06:08

## 2019-12-24 RX ADMIN — SODIUM CHLORIDE 50 MILLILITER(S): 9 INJECTION INTRAMUSCULAR; INTRAVENOUS; SUBCUTANEOUS at 00:13

## 2019-12-24 RX ADMIN — Medication 3 MILLILITER(S): at 05:58

## 2019-12-24 RX ADMIN — CHLORHEXIDINE GLUCONATE 1 APPLICATION(S): 213 SOLUTION TOPICAL at 05:37

## 2019-12-24 RX ADMIN — HEPARIN SODIUM 5000 UNIT(S): 5000 INJECTION INTRAVENOUS; SUBCUTANEOUS at 17:14

## 2019-12-24 RX ADMIN — Medication 2: at 23:55

## 2019-12-24 RX ADMIN — Medication 650 MILLIGRAM(S): at 23:54

## 2019-12-24 RX ADMIN — LEVETIRACETAM 400 MILLIGRAM(S): 250 TABLET, FILM COATED ORAL at 17:14

## 2019-12-24 RX ADMIN — HEPARIN SODIUM 5000 UNIT(S): 5000 INJECTION INTRAVENOUS; SUBCUTANEOUS at 05:37

## 2019-12-24 RX ADMIN — Medication 3 MILLILITER(S): at 17:37

## 2019-12-24 RX ADMIN — HUMAN INSULIN 9 UNIT(S): 100 INJECTION, SUSPENSION SUBCUTANEOUS at 06:49

## 2019-12-24 RX ADMIN — Medication 1000 MILLIGRAM(S): at 00:28

## 2019-12-24 RX ADMIN — LEVETIRACETAM 400 MILLIGRAM(S): 250 TABLET, FILM COATED ORAL at 05:38

## 2019-12-24 RX ADMIN — Medication 3 MILLILITER(S): at 11:13

## 2019-12-24 RX ADMIN — HUMAN INSULIN 9 UNIT(S): 100 INJECTION, SUSPENSION SUBCUTANEOUS at 23:55

## 2019-12-24 RX ADMIN — LACOSAMIDE 110 MILLIGRAM(S): 50 TABLET ORAL at 17:15

## 2019-12-24 RX ADMIN — HUMAN INSULIN 9 UNIT(S): 100 INJECTION, SUSPENSION SUBCUTANEOUS at 00:14

## 2019-12-24 RX ADMIN — SODIUM CHLORIDE 1000 MILLILITER(S): 9 INJECTION INTRAMUSCULAR; INTRAVENOUS; SUBCUTANEOUS at 02:27

## 2019-12-24 RX ADMIN — Medication 3 MILLILITER(S): at 01:09

## 2019-12-24 NOTE — PROGRESS NOTE ADULT - SUBJECTIVE AND OBJECTIVE BOX
[facility]   MD Yokasta Gary D.O. Fatima Sheikh, D.O. Ruoru Wong, PA    From 7 AM - 5 PM:  OFFICE: [phone]  Dr. Barrientos cell: [phone]  Dr. Lopes cell: [phone]  Dr. Witt cell: [phone]  WALDO Valentin cell: [phone]    From 5 PM - 7 AM: Answering Service: [phone]        RENAL FOLLOW UP NOTE  --------------------------------------------------------------------------------  HPI: Pt seen and examined while on HD in NSICU today. BP stable currently 129/79 in ICU while on HD. Pt is mumbling incoherently, unintelligible speech.         PAST HISTORY  --------------------------------------------------------------------------------  No significant changes to PMH, PSH, FHx, SHx, unless otherwise noted    ALLERGIES & MEDICATIONS  --------------------------------------------------------------------------------  Allergies    No Known Allergies    Intolerances      Standing Inpatient Medications  albuterol/ipratropium for Nebulization 3 milliLiter(s) Nebulizer every 6 hours  aspirin  chewable 81 milliGRAM(s) Oral daily  atorvastatin 80 milliGRAM(s) Oral at bedtime  calcium acetate 667 milliGRAM(s) Oral three times a day with meals  carvedilol 3.125 milliGRAM(s) Oral every 12 hours  chlorhexidine 4% Liquid 1 Application(s) Topical <User Schedule>  clopidogrel Tablet 75 milliGRAM(s) Oral <User Schedule>  heparin  Injectable 5000 Unit(s) SubCutaneous every 12 hours  insulin lispro (HumaLOG) corrective regimen sliding scale   SubCutaneous every 6 hours  insulin NPH human recombinant 18 Unit(s) SubCutaneous every 6 hours  lacosamide IVPB 50 milliGRAM(s) IV Intermittent every 12 hours  levETIRAcetam  IVPB 250 milliGRAM(s) IV Intermittent every 12 hours  levETIRAcetam  Solution 125 milliGRAM(s) Oral daily  levothyroxine 50 MICROGram(s) Oral daily  polyethylene glycol 3350 17 Gram(s) Oral daily  senna 2 Tablet(s) Oral at bedtime  sodium chloride 0.9%. 1000 milliLiter(s) IV Continuous <Continuous>    PRN Inpatient Medications      REVIEW OF SYSTEMS  --------------------------------------------------------------------------------  unable to obtain     VITALS/PHYSICAL EXAM  --------------------------------------------------------------------------------  T(C): 36.7 (12-24-19 @ 11:00), Max: 38.9 (12-24-19 @ 00:00)  HR: 81 (12-24-19 @ 11:14) (79 - 98)  BP: 145/66 (12-24-19 @ 11:00) (79/44 - 162/72)  RR: 17 (12-24-19 @ 11:00) (12 - 30)  SpO2: 100% (12-24-19 @ 11:14) (95% - 100%)  Wt(kg): --        12-23-19 @ 07:01  -  12-24-19 @ 07:00  --------------------------------------------------------  IN: 2210 mL / OUT: 400 mL / NET: 1810 mL    12-24-19 @ 07:01  -  12-24-19 @ 12:49  --------------------------------------------------------  IN: 200 mL / OUT: 0 mL / NET: 200 mL      Physical Exam:  	Gen: NAD  	HEENT: MMM  	Pulm: CTA B/L  	CV: S1S2  	Abd: Soft, +BS  	Ext: No LE edema B/L              Neuro: Awake, following commands today, moving both feet.   	Skin: Warm and Dry   	Vascular access: [facility] currently accessed for HD.    LABS/STUDIES  --------------------------------------------------------------------------------              8.2    18.40 >-----------<  459      [12-24-19 @ 01:15]              25.3     137  |  96  |  92  ----------------------------<  114      [12-24-19 @ 01:15]  4.3   |  23  |  7.00        Ca     8.6     [12-24-19 @ 01:15]      Mg     2.7     [12-24-19 @ 01:15]      Phos  3.8     [12-24-19 @ 01:15]    TPro  7.0  /  Alb  2.7  /  TBili  0.3  /  DBili  <0.1  /  AST  21  /  ALT  16  /  AlkPhos  59  [12-24-19 @ 10:07]          Creatinine Trend:  SCr 7.00 [12-24 @ 01:15]  SCr 3.60 [12-23 @ 00:18]  SCr 6.90 [12-22 @ 00:08]  SCr 5.45 [12-21 @ 09:29]  SCr 4.03 [12-20 @ 20:57]    Urinalysis - [12-24-19 @ 01:57]      Color Yellow / Appearance Clear / SG 1.017 / pH 6.5      Gluc 500 mg/dL / Ketone Negative  / Bili Negative / Urobili Negative       Blood Small / Protein 300 mg/dL / Leuk Est Negative / Nitrite Negative      RBC 3 / WBC 4 / Hyaline 1 / Gran  / Sq Epi  / Non Sq Epi 1 / Bacteria Negative      Iron 26, TIBC 205, %sat 13      [06-04-19 @ 09:27]  Ferritin 99      [06-04-19 @ 09:33]  PTH -- (Ca 6.9)      [06-05-19 @ 08:14]   562  HbA1c 8.4      [12-15-19 @ 11:00]  TSH 0.88      [12-19-19 @ 10:33]  Lipid: chol 134, , HDL 56, LDL 52      [12-15-19 @ 10:56]    HBsAb >1000.0      [12-14-19 @ 23:48]  HBsAg Nonreact      [12-14-19 @ 23:48]  HCV 0.15, Nonreact      [12-14-19 @ 23:48]

## 2019-12-24 NOTE — PROGRESS NOTE ADULT - SUBJECTIVE AND OBJECTIVE BOX
NSCU ATTENDING -- ADDITIONAL PROGRESS NOTE    Nighttime rounds were performed -- please refer to earlier Progress Note for HPI details.    T(C): 37.1 (12-24-19 @ 19:00), Max: 38.9 (12-24-19 @ 00:00)  HR: 97 (12-24-19 @ 23:00) (79 - 100)  BP: 153/67 (12-24-19 @ 23:00) (79/44 - 166/72)  RR: 30 (12-24-19 @ 23:00) (12 - 64)  SpO2: 98% (12-24-19 @ 23:00) (94% - 100%)  Wt(kg): --    Relevant labwork and imaging reviewed.    Respiratory status improved today.  More awake as well.  Likely restart feeds in AM and touch base with stroke neurology regarding further care.

## 2019-12-24 NOTE — PROGRESS NOTE ADULT - SUBJECTIVE AND OBJECTIVE BOX
SUMMARY:  From notes,   "65 yo M with h/o CAD s/p stent 2009 and abnormal cath 9/2018 with 90% pRCA lesion (not intervened upon) with placement of drug eluting stent everolimus Synergy  3x 28 mm in June 2019, HTN, DM,  L carotid stenosis, hypothyroidism and CKD stage 5  (M-W-FRI  awaiting renal transplant expected within 3 months. Pt presented  to the ER for altered mental status. EMS contacted by wife when pt noted unresponsive, aphasic and not moving extremities; pt Rx'd with TPA. 12/14 pt had CT neck/head; read as SAH. Early am 12/15 pt with Sz and started on Keppra.12/15 pt had MR head: Numerous acute diffuse bilateral supratentorial and infratentorial infarcts. CT/MR head reviewed with radiology. Pt did not have SAH it was contrast from the studies. Poor MS 2 to multiple cerebral infarcts. During HD pt went into A fib which may be the possible cause of the strokes. s/p intubation 12/16 for airway protection"    *Unresponsive during dialysis 12/13 then to ED. SBPs 240s dropped to 140s on cardene in ED.     Per signout, patient was in NCSE whenever stat EEG was checked at OSH (x 3). He was transferred to [facility] for continuous EEG monitoring. Patient was noted to be Keppra 250 iv q12, Vimpat 50 iv q12 and Phenobarb 100 iv q8H, and was transferred on Versed gtt (BP not tolerate propofol gtt)    *** HIGH RISK OF DVT PRESENT ON ADMISSION ***    Overnight Events: Was febrile and hypotensive overnight. Pan-cultured.   Drop in BP not related to medication - Received Coreg 3.125 @ 1700 on 12/23; BP dropped @ 0200. 0600AM Coreg dose held.     ROS: Negative unless specified    VITALS:   T(C): 38.2 (12-24-19 @ 03:00), Max: 38.9 (12-24-19 @ 00:00)  HR: 84 (12-24-19 @ 06:00) (81 - 98)  BP: 129/60 (12-24-19 @ 06:00) (79/44 - 162/72)  RR: 23 (12-24-19 @ 06:00) (12 - 26)  SpO2: 100% (12-24-19 @ 06:00) (95% - 100%)    12-22-19 @ 07:01  -  12-23-19 @ 07:00  --------------------------------------------------------  IN: 1795 mL / OUT: 2150 mL / NET: -355 mL    12-23-19 @ 07:01  -  12-24-19 @ 06:46  --------------------------------------------------------  IN: 2160 mL / OUT: 400 mL / NET: 1760 mL      LABS:  ABG - ( 24 Dec 2019 01:12 )  pH, Arterial: 7.45  pH, Blood: x     /  pCO2: 38    /  pO2: 98    / HCO3: 26    / Base Excess: 2.6   /  SaO2: 97                        8.2    18.40 )-----------( 459      ( 24 Dec 2019 01:15 )             25.3     12-24    137  |  96  |  92<H>  ----------------------------<  114<H>  4.3   |  23  |  7.00<H>    Ca    8.6      24 Dec 2019 01:15  Phos  3.8     12-24  Mg     2.7     12-24        MEDS:  MEDICATIONS  (STANDING):  albuterol/ipratropium for Nebulization 3 milliLiter(s) Nebulizer every 6 hours  aspirin  chewable 81 milliGRAM(s) Oral daily  atorvastatin 80 milliGRAM(s) Oral at bedtime  calcium acetate 667 milliGRAM(s) Oral three times a day with meals  carvedilol 3.125 milliGRAM(s) Oral every 12 hours  chlorhexidine 4% Liquid 1 Application(s) Topical <User Schedule>  clopidogrel Tablet 75 milliGRAM(s) Oral <User Schedule>  heparin  Injectable 5000 Unit(s) SubCutaneous every 12 hours  insulin lispro (HumaLOG) corrective regimen sliding scale   SubCutaneous every 6 hours  insulin NPH human recombinant 18 Unit(s) SubCutaneous every 6 hours  insulin NPH human recombinant 9 Unit(s) SubCutaneous once  lacosamide IVPB 50 milliGRAM(s) IV Intermittent every 12 hours  levETIRAcetam  IVPB 250 milliGRAM(s) IV Intermittent every 12 hours  levETIRAcetam  Solution 125 milliGRAM(s) Oral daily  levothyroxine 50 MICROGram(s) Oral daily  polyethylene glycol 3350 17 Gram(s) Oral daily  senna 2 Tablet(s) Oral at bedtime  sodium chloride 0.9%. 1000 milliLiter(s) (50 mL/Hr) IV Continuous <Continuous>    EXAMINATION:  General:  calm,   HEENT:  MMM  Neuro:  EO spont,  R sided neglect Oriented to location only  PERRL 2mm, VOR intact, face symmetric  No movement in bilat upper ext  FC b/l LE (L > R); bent L knee, moved feet on R  Cards:  RRR  Respiratory: CTAB. Limited exam  Abdomen:  soft  Extremities:  no edema  Skin:  warm/dry

## 2019-12-24 NOTE — PROGRESS NOTE ADULT - ASSESSMENT
63 yo male with ESRD on HD (M-W-FRI without AVF as awaiting renal transplant expected within 3 months; Dr. Barrientos; [facility]), CAD s/p stent 2009 and CARLA in June 2019, HTN, DM type 2, hypothyroidism and left carotid stenosis admitted for altered mental status during dialysis, he became unresponsive and arm not moving therefore he was sent to hospital. At [facility], he received tPA on 12/13/19. Was noted to have SAH after tPA. Started on 3% saline. Had seizure, despite multiple AEDs remained in status epilepticus and was transferred to [facility] for continuous EEG monitoring. Remains in status epilepticus here despite multiple anti-seizure meds. Nephrology is following for ESRD management.    ESRD on HD MWF  - HD consent in the chart    - continue HD today due to holiday scheduling; tolerating well; discussed w/ HD RN to hold UF and call me if BP <110/60    - anticipate next HD Friday 12/27/19  - please dose Keppra post HD on HD days      Anemia  - Hgb below goal; unable to use epogen due to CVA  - transfuse PRN Hgb <7 or hemodynamically significant bleeding     HTN  - management per NSICU team   - Will avoid hypotension w/ HD  - discussed w/ HD RN to hold UF and call me if BP <110/60

## 2019-12-24 NOTE — PROGRESS NOTE ADULT - ASSESSMENT
Summary: 65 yo M with h/o CAD s/p stent 2009 and abnormal cath 9/2018 with 90% pRCA lesion not intervened upon) with placement of drug eluting stent everolimus Synergy  3x 28 mm in June 2019, HTN, DM,  L carotid stenosis, hypothyroidism and CKD stage 5  (M-W-FRI  awaiting renal transplant expected within 3 months presented after unresponsiveness at HD, now s/p tpa and found to be in status epilepticus at OSH, now resolved.  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     NEURO:  q4h neuro checks  vimpat, Keppra (redose post HD)  Carotid dopplers for stroke workup  Stroke Neurology consult  MRI/MRA Brain if able  OOB/PT/OT    PULM:  sat > 92%  Extubated 12/21  Incentive spirometry, if able    CARDS:   - 160  Episode of hypotension overnight - ?Etiology unclear - ?sepsis, but resolved w/o intervention  Echo LV 60-65% (12/15/19)  CAD - ASA/plavix, statin, Coreg 3.125 BID    RENAL:  CKD stage V awaiting transplant  nephrology consulted- HD MWF  Was dialyzed 12/22 (due to holiday schedule)  On Calcium acetate 667 TID (home med)  baseline bladder scan    GASTRO:  On Neproporth  Bowel regimen  ---> Stress ulcer prophylaxis: Not indicated    HEME:  monitor H/H    ---> DVT prophylaxis: SCDs, heparin 5000 q12h    ENDO:  hypothyroid, DMII  cont synthroid  euglycemia  On NPH 18q6 and SSI    ID: Cultured 12/24  ?Start Vanc/Zosyn    Code status:  Full code  Disposition: ICU    This patient was at high risk of neurologic deterioration and/or death due to: seizures, sepsis    Time spent:  40 minutes

## 2019-12-24 NOTE — PROGRESS NOTE ADULT - SUBJECTIVE AND OBJECTIVE BOX
Patient seen and examined at bedside.    Overnight Events:   No overnight events.     REVIEW OF SYSTEMS:  Constitutional:     [ ] negative [ ] fevers [ ] chills [ ] weight loss [ ] weight gain  HEENT:                  [ ] negative [ ] dry eyes [ ] eye irritation [ ] postnasal drip [ ] nasal congestion  CV:                         [ ] negative  [ ] chest pain [ ] orthopnea [ ] palpitations [ ] murmur  Resp:                     [ ] negative [ ] cough [ ] shortness of breath [ ] dyspnea [ ] wheezing [ ] sputum [ ]hemoptysis  GI:                          [ ] negative [ ] nausea [ ] vomiting [ ] diarrhea [ ] constipation [ ] abd pain [ ] dysphagia   :                        [ ] negative [ ] dysuria [ ] nocturia [ ] hematuria [ ] increased urinary frequency  Musculoskeletal: [ ] negative [ ] back pain [ ] myalgias [ ] arthralgias [ ] fracture  Skin:                       [ ] negative [ ] rash [ ] itch  Neurological:        [ ] negative [ ] headache [ ] dizziness [ ] syncope [ ] weakness [ ] numbness  Psychiatric:           [ ] negative [ ] anxiety [ ] depression  Endocrine:            [ ] negative [ ] diabetes [ ] thyroid problem  Heme/Lymph:      [ ] negative [ ] anemia [ ] bleeding problem  Allergic/Immune: [ ] negative [ ] itchy eyes [ ] nasal discharge [ ] hives [ ] angioedema    [ ] All other systems negative  [ x ] Unable to assess ROS due to AMS    Current Meds:  albuterol/ipratropium for Nebulization 3 milliLiter(s) Nebulizer every 6 hours  aspirin  chewable 81 milliGRAM(s) Oral daily  atorvastatin 80 milliGRAM(s) Oral at bedtime  calcium acetate 667 milliGRAM(s) Oral three times a day with meals  carvedilol 3.125 milliGRAM(s) Oral every 12 hours  chlorhexidine 4% Liquid 1 Application(s) Topical <User Schedule>  clopidogrel Tablet 75 milliGRAM(s) Oral <User Schedule>  heparin  Injectable 5000 Unit(s) SubCutaneous every 12 hours  insulin lispro (HumaLOG) corrective regimen sliding scale   SubCutaneous every 6 hours  insulin NPH human recombinant 18 Unit(s) SubCutaneous every 6 hours  lacosamide IVPB 50 milliGRAM(s) IV Intermittent every 12 hours  levETIRAcetam  IVPB 250 milliGRAM(s) IV Intermittent every 12 hours  levETIRAcetam  Solution 125 milliGRAM(s) Oral daily  levothyroxine 50 MICROGram(s) Oral daily  polyethylene glycol 3350 17 Gram(s) Oral daily  senna 2 Tablet(s) Oral at bedtime  sodium chloride 0.9%. 1000 milliLiter(s) IV Continuous <Continuous>      PAST MEDICAL & SURGICAL HISTORY:  Hypothyroidism  CRI (Chronic Renal Insufficiency)  CAD (Coronary Artery Disease): with Stents in 06/2009 and 6/2019  Dyslipidemia  Diabetes  Hypertension  History of insertion of stent into coronary artery bypass graft: 2009 and 2019      Vitals:  T(F): 98.1 (12-24), Max: 102 (12-24)  HR: 87 (12-24) (79 - 98)  BP: 142/67 (12-24) (79/44 - 162/72)  RR: 23 (12-24)  SpO2: 100% (12-24)  I&O's Summary    23 Dec 2019 07:01  -  24 Dec 2019 07:00  --------------------------------------------------------  IN: 2210 mL / OUT: 400 mL / NET: 1810 mL    24 Dec 2019 07:01  -  24 Dec 2019 12:49  --------------------------------------------------------  IN: 200 mL / OUT: 0 mL / NET: 200 mL        Physical Exam:  Appearance: Comfortable no acute distress  Eyes: pink conjunctiva  HENT: dry oral mucosa, ng tube in right nares   Cardiovascular: RRR, S1, S2, no murmurs, rubs, or gallops; no edema; no JVD  Respiratory: Clear to auscultation bilaterally  Gastrointestinal: soft, non-tender, non-distended with normal bowel sounds  Musculoskeletal: No clubbing; no joint deformity   Neurologic: AAOx0, moves toes on command, tracks voices  Lymphatic: No lymphadenopathy  Skin: No rashes, ecchymoses, or cyanosis                                     8.2    18.40 )-----------( 459      ( 24 Dec 2019 01:15 )             25.3     12-24    137  |  96  |  92<H>  ----------------------------<  114<H>  4.3   |  23  |  7.00<H>    Ca    8.6      24 Dec 2019 01:15  Phos  3.8     12-24  Mg     2.7     12-24    TPro  7.0  /  Alb  2.7<L>  /  TBili  0.3  /  DBili  <0.1  /  AST  21  /  ALT  16  /  AlkPhos  59  12-24                  New ECG(s): Personally reviewed    Echo:    Stress Testing:     Cath:    Imaging:    Interpretation of Telemetry:

## 2019-12-25 LAB
ANION GAP SERPL CALC-SCNC: 19 MMOL/L — HIGH (ref 5–17)
BUN SERPL-MCNC: 54 MG/DL — HIGH (ref 7–23)
CALCIUM SERPL-MCNC: 8.5 MG/DL — SIGNIFICANT CHANGE UP (ref 8.4–10.5)
CHLORIDE SERPL-SCNC: 95 MMOL/L — LOW (ref 96–108)
CO2 SERPL-SCNC: 22 MMOL/L — SIGNIFICANT CHANGE UP (ref 22–31)
CREAT SERPL-MCNC: 4.77 MG/DL — HIGH (ref 0.5–1.3)
GLUCOSE SERPL-MCNC: 221 MG/DL — HIGH (ref 70–99)
HCT VFR BLD CALC: 24.4 % — LOW (ref 39–50)
HGB BLD-MCNC: 7.7 G/DL — LOW (ref 13–17)
MAGNESIUM SERPL-MCNC: 2.2 MG/DL — SIGNIFICANT CHANGE UP (ref 1.6–2.6)
MCHC RBC-ENTMCNC: 29.2 PG — SIGNIFICANT CHANGE UP (ref 27–34)
MCHC RBC-ENTMCNC: 31.6 GM/DL — LOW (ref 32–36)
MCV RBC AUTO: 92.4 FL — SIGNIFICANT CHANGE UP (ref 80–100)
NRBC # BLD: 0 /100 WBCS — SIGNIFICANT CHANGE UP (ref 0–0)
PHOSPHATE SERPL-MCNC: 5.1 MG/DL — HIGH (ref 2.5–4.5)
PLATELET # BLD AUTO: 499 K/UL — HIGH (ref 150–400)
POTASSIUM SERPL-MCNC: 4.8 MMOL/L — SIGNIFICANT CHANGE UP (ref 3.5–5.3)
POTASSIUM SERPL-SCNC: 4.8 MMOL/L — SIGNIFICANT CHANGE UP (ref 3.5–5.3)
RBC # BLD: 2.64 M/UL — LOW (ref 4.2–5.8)
RBC # FLD: 16.9 % — HIGH (ref 10.3–14.5)
SODIUM SERPL-SCNC: 136 MMOL/L — SIGNIFICANT CHANGE UP (ref 135–145)
WBC # BLD: 18.77 K/UL — HIGH (ref 3.8–10.5)
WBC # FLD AUTO: 18.77 K/UL — HIGH (ref 3.8–10.5)

## 2019-12-25 PROCEDURE — 99233 SBSQ HOSP IP/OBS HIGH 50: CPT

## 2019-12-25 RX ORDER — HUMAN INSULIN 100 [IU]/ML
9 INJECTION, SUSPENSION SUBCUTANEOUS ONCE
Refills: 0 | Status: COMPLETED | OUTPATIENT
Start: 2019-12-25 | End: 2019-12-25

## 2019-12-25 RX ORDER — LACOSAMIDE 50 MG/1
50 TABLET ORAL EVERY 12 HOURS
Refills: 0 | Status: DISCONTINUED | OUTPATIENT
Start: 2019-12-25 | End: 2019-12-28

## 2019-12-25 RX ORDER — SODIUM CHLORIDE 9 MG/ML
1000 INJECTION INTRAMUSCULAR; INTRAVENOUS; SUBCUTANEOUS
Refills: 0 | Status: DISCONTINUED | OUTPATIENT
Start: 2019-12-25 | End: 2019-12-28

## 2019-12-25 RX ADMIN — Medication 81 MILLIGRAM(S): at 13:16

## 2019-12-25 RX ADMIN — CARVEDILOL PHOSPHATE 3.12 MILLIGRAM(S): 80 CAPSULE, EXTENDED RELEASE ORAL at 05:25

## 2019-12-25 RX ADMIN — LACOSAMIDE 110 MILLIGRAM(S): 50 TABLET ORAL at 18:24

## 2019-12-25 RX ADMIN — Medication 650 MILLIGRAM(S): at 15:05

## 2019-12-25 RX ADMIN — LEVETIRACETAM 400 MILLIGRAM(S): 250 TABLET, FILM COATED ORAL at 17:11

## 2019-12-25 RX ADMIN — LACOSAMIDE 110 MILLIGRAM(S): 50 TABLET ORAL at 05:39

## 2019-12-25 RX ADMIN — Medication 3 MILLILITER(S): at 17:11

## 2019-12-25 RX ADMIN — Medication 3 MILLILITER(S): at 00:34

## 2019-12-25 RX ADMIN — CLOPIDOGREL BISULFATE 75 MILLIGRAM(S): 75 TABLET, FILM COATED ORAL at 17:11

## 2019-12-25 RX ADMIN — HEPARIN SODIUM 5000 UNIT(S): 5000 INJECTION INTRAVENOUS; SUBCUTANEOUS at 17:12

## 2019-12-25 RX ADMIN — CARVEDILOL PHOSPHATE 3.12 MILLIGRAM(S): 80 CAPSULE, EXTENDED RELEASE ORAL at 17:18

## 2019-12-25 RX ADMIN — HEPARIN SODIUM 5000 UNIT(S): 5000 INJECTION INTRAVENOUS; SUBCUTANEOUS at 05:24

## 2019-12-25 RX ADMIN — Medication 3 MILLILITER(S): at 05:09

## 2019-12-25 RX ADMIN — SENNA PLUS 2 TABLET(S): 8.6 TABLET ORAL at 22:19

## 2019-12-25 RX ADMIN — SODIUM CHLORIDE 50 MILLILITER(S): 9 INJECTION INTRAMUSCULAR; INTRAVENOUS; SUBCUTANEOUS at 17:17

## 2019-12-25 RX ADMIN — Medication 2: at 18:00

## 2019-12-25 RX ADMIN — ATORVASTATIN CALCIUM 80 MILLIGRAM(S): 80 TABLET, FILM COATED ORAL at 22:19

## 2019-12-25 RX ADMIN — Medication 3 MILLILITER(S): at 11:22

## 2019-12-25 RX ADMIN — HUMAN INSULIN 9 UNIT(S): 100 INJECTION, SUSPENSION SUBCUTANEOUS at 05:38

## 2019-12-25 RX ADMIN — Medication 650 MILLIGRAM(S): at 00:24

## 2019-12-25 RX ADMIN — LEVETIRACETAM 400 MILLIGRAM(S): 250 TABLET, FILM COATED ORAL at 06:21

## 2019-12-25 RX ADMIN — Medication 650 MILLIGRAM(S): at 15:35

## 2019-12-25 RX ADMIN — LEVETIRACETAM 125 MILLIGRAM(S): 250 TABLET, FILM COATED ORAL at 13:16

## 2019-12-25 RX ADMIN — POLYETHYLENE GLYCOL 3350 17 GRAM(S): 17 POWDER, FOR SOLUTION ORAL at 13:16

## 2019-12-25 RX ADMIN — Medication 50 MICROGRAM(S): at 05:25

## 2019-12-25 RX ADMIN — CHLORHEXIDINE GLUCONATE 1 APPLICATION(S): 213 SOLUTION TOPICAL at 05:39

## 2019-12-25 RX ADMIN — Medication 3 MILLILITER(S): at 23:11

## 2019-12-25 NOTE — PROGRESS NOTE ADULT - ASSESSMENT
Summary: 65 yo M with h/o CAD s/p stent 2009 and abnormal cath 9/2018 with 90% pRCA lesion not intervened upon) with placement of drug eluting stent everolimus Synergy  3x 28 mm in June 2019, HTN, DM,  L carotid stenosis, hypothyroidism and CKD stage 5  (M-W-FRI  awaiting renal transplant expected within 3 months presented after unresponsiveness at HD, now s/p tpa and found to be in status epilepticus at OSH, now resolved.  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     NEURO:  q4h neuro checks  vimpat, Keppra (redose post HD)  Carotid dopplers: R ICA 70-99% stenosis; L ICA 50-69% stenosis  Will consult NSGY  Stroke Neurology consult  MRI/MRA Brain if able  OOB/PT/OT    PULM:  sat > 92%  Extubated 12/21  Incentive spirometry, if able    CARDS:   - 160  No further episodes of hypotension  Echo LV 60-65% (12/15/19)  CAD - ASA/plavix, statin, Coreg 3.125 BID    RENAL:  CKD stage V awaiting transplant  nephrology consulted- HD MWF  Was dialyzed 12/22 (due to holiday schedule)  On Calcium acetate 667 TID (home med)  baseline bladder scan    GASTRO:  Resume TF  Bowel regimen  ---> Stress ulcer prophylaxis: Not indicated    HEME:  HH drop noted; monitor  ---> DVT prophylaxis: SCDs, heparin 5000 q12h    ENDO:  hypothyroid, DMII  cont synthroid  euglycemia  On NPH 18q6 and SSI    ID: Cultured 12/24  Sputum gram stain - Numerous gram positive cocci, numerous gram negative rods  Will await sputum cultures  Monitor off abx  CT Chest/Abd/Pelvis - negative for source of infection.     Code status:  Full code  Disposition: ?Stroke unit (pending acceptance).  No ICU needs if secretions no longer controlled.     This patient was at high risk of neurologic deterioration and/or death due to: seizures, sepsis    Time spent:  40 minutes Summary: 63 yo M with h/o CAD s/p stent 2009 and abnormal cath 9/2018 with 90% pRCA lesion not intervened upon) with placement of drug eluting stent everolimus Synergy  3x 28 mm in June 2019, HTN, DM,  L carotid stenosis, hypothyroidism and CKD stage 5  (M-W-FRI  awaiting renal transplant expected within 3 months presented after unresponsiveness at HD, now s/p tpa and found to be in status epilepticus at OSH, now resolved.  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     NEURO:  q4h neuro checks  vimpat, Keppra (redose post HD)  Carotid dopplers: R ICA 70-99% stenosis; L ICA 50-69% stenosis  Will consult NSGY regarding carotid stenosis  Stroke Neurology consult  MRI/MRA Brain if able  OOB/PT/OT    PULM:  sat > 92%  Extubated 12/21  Incentive spirometry, if able    CARDS:   - 160  No further episodes of hypotension  Echo LV 60-65% (12/15/19)  CAD - ASA/plavix, statin, Coreg 3.125 BID    RENAL:  CKD stage V awaiting transplant  nephrology consulted- HD MWF  Was dialyzed 12/22 (due to holiday schedule)  On Calcium acetate 667 TID (home med)  baseline bladder scan    GASTRO:  Resume TF  Bowel regimen  ---> Stress ulcer prophylaxis: Not indicated    HEME:  HH drop noted; monitor  ---> DVT prophylaxis: SCDs, heparin 5000 q12h    ENDO:  hypothyroid, DMII  cont synthroid  euglycemia  On NPH 18q6 and SSI    ID: Cultured 12/24  Sputum cultures  - Numerous Staph aureus, Moderate Enterobacter. Normal Respiratory giovanni  Monitor off abx  CT Chest/Abd/Pelvis - negative for source of infection.     Code status:  Full code  Disposition: Stroke unit  (Accepted by Dr. Baez)    This patient was at high risk of neurologic deterioration and/or death due to: seizures, sepsis    Time spent:  40 minutes Summary: 63 yo M with h/o CAD s/p stent 2009 and abnormal cath 9/2018 with 90% pRCA lesion not intervened upon) with placement of drug eluting stent everolimus Synergy  3x 28 mm in June 2019, HTN, DM,  L carotid stenosis, hypothyroidism and CKD stage 5  (M-W-FRI  awaiting renal transplant expected within 3 months presented after unresponsiveness at HD, now s/p tpa and found to be in status epilepticus at OSH, now resolved.  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     NEURO:  q4h neuro checks  vimpat, Keppra (redose post HD)  Carotid dopplers: R ICA 70-99% stenosis; L ICA 50-69% stenosis  Will consult NSGY regarding carotid stenosis  Stroke Neurology following  MRI/MRA Brain if able  OOB/PT/OT    PULM:  sat > 92%  Extubated 12/21  Incentive spirometry, if able    CARDS:   - 160  No further episodes of hypotension  Echo LV 60-65% (12/15/19)  CAD - ASA/plavix, statin, Coreg 3.125 BID    RENAL:  CKD stage V awaiting transplant  nephrology consulted- HD MWF  Was dialyzed 12/22 (due to holiday schedule)  On Calcium acetate 667 TID (home med)  baseline bladder scan    GASTRO:  Resume TF  Bowel regimen  ---> Stress ulcer prophylaxis: Not indicated    HEME:  HH drop noted; monitor  ---> DVT prophylaxis: SCDs, heparin 5000 q12h    ENDO:  hypothyroid, DMII  cont synthroid  euglycemia  On NPH 18q6 and SSI    ID: Cultured 12/24  Sputum cultures  - Numerous Staph aureus, Moderate Enterobacter. Normal Respiratory giovanni  Monitor off abx  CT Chest/Abd/Pelvis - negative for source of infection.     Code status:  Full code  Disposition: Stroke unit  (Accepted by Dr. Baez)    Patient was not critically ill, but was medically complex.  30 minutes spent.

## 2019-12-25 NOTE — PROGRESS NOTE ADULT - ASSESSMENT
65 yo male with ESRD on HD (M-W-FRI without AVF as awaiting renal transplant expected within 3 months; Dr. Barrientos; [facility]), CAD s/p stent 2009 and CARLA in June 2019, HTN, DM type 2, hypothyroidism and left carotid stenosis admitted for altered mental status during dialysis, he became unresponsive and arm not moving therefore he was sent to hospital. At [facility], he received tPA on 12/13/19. Was noted to have SAH after tPA. Started on 3% saline. Had seizure, despite multiple AEDs remained in status epilepticus and was transferred to [facility] for continuous EEG monitoring. Remains in status epilepticus here despite multiple anti-seizure meds. Nephrology is following for ESRD management.    ESRD on HD MWF  - HD consent in the chart    - last HD yesterday 12/24/19; tolerated well, no hypotensive episodes    - anticipate next HD Friday 12/27/19  - please dose Keppra post HD on HD days      Anemia  - Hgb below goal; unable to use epogen due to CVA  - transfuse PRN Hgb <7 or hemodynamically significant bleeding     HTN  - management per NSICU team   - Will avoid hypotension w/ HD

## 2019-12-25 NOTE — PROGRESS NOTE ADULT - SUBJECTIVE AND OBJECTIVE BOX
[facility]   MD Yokasta Gary D.O. Fatima Sheikh, D.O. Ruoru Wong, PA    From 7 AM - 5 PM:  OFFICE: [phone]  Dr. Barrientos cell: [phone]  Dr. Lopes cell: [phone]  Dr. Witt cell: [phone]  WALDO Valentin cell: [phone]    From 5 PM - 7 AM: Answering Service: [phone]        RENAL FOLLOW UP NOTE  --------------------------------------------------------------------------------  HPI: Pt seen and examined. Awake and following commands this AM. Denies any pain, SOB. Falls asleep in between questions.        PAST HISTORY  --------------------------------------------------------------------------------  No significant changes to PMH, PSH, FHx, SHx, unless otherwise noted    ALLERGIES & MEDICATIONS  --------------------------------------------------------------------------------  Allergies    No Known Allergies    Intolerances      Standing Inpatient Medications  albuterol/ipratropium for Nebulization 3 milliLiter(s) Nebulizer every 6 hours  aspirin  chewable 81 milliGRAM(s) Oral daily  atorvastatin 80 milliGRAM(s) Oral at bedtime  calcium acetate 667 milliGRAM(s) Oral three times a day with meals  carvedilol 3.125 milliGRAM(s) Oral every 12 hours  chlorhexidine 4% Liquid 1 Application(s) Topical <User Schedule>  clopidogrel Tablet 75 milliGRAM(s) Oral <User Schedule>  heparin  Injectable 5000 Unit(s) SubCutaneous every 12 hours  insulin lispro (HumaLOG) corrective regimen sliding scale   SubCutaneous every 6 hours  insulin NPH human recombinant 18 Unit(s) SubCutaneous every 6 hours  lacosamide IVPB 50 milliGRAM(s) IV Intermittent every 12 hours  levETIRAcetam  IVPB 250 milliGRAM(s) IV Intermittent every 12 hours  levETIRAcetam  Solution 125 milliGRAM(s) Oral daily  levothyroxine 50 MICROGram(s) Oral daily  polyethylene glycol 3350 17 Gram(s) Oral daily  senna 2 Tablet(s) Oral at bedtime  sodium chloride 0.9%. 1000 milliLiter(s) IV Continuous <Continuous>    PRN Inpatient Medications  acetaminophen    Suspension .. 650 milliGRAM(s) Oral every 6 hours PRN      REVIEW OF SYSTEMS  --------------------------------------------------------------------------------  General: no fever  CVS: no chest pain  RESP: no sob, no cough  ABD: no abdominal pain  : no dysuria,  MSK: no edema     VITALS/PHYSICAL EXAM  --------------------------------------------------------------------------------  T(C): 37.3 (12-25-19 @ 07:00), Max: 38.5 (12-24-19 @ 23:00)  HR: 86 (12-25-19 @ 08:00) (79 - 100)  BP: 139/63 (12-25-19 @ 08:00) (110/53 - 166/72)  RR: 17 (12-25-19 @ 08:00) (17 - 64)  SpO2: 100% (12-25-19 @ 08:00) (94% - 100%)  Wt(kg): --        12-24-19 @ 07:01  -  12-25-19 @ 07:00  --------------------------------------------------------  IN: 1630 mL / OUT: 1700 mL / NET: -70 mL      Physical Exam:  	Gen: NAD  	HEENT: MMM  	Pulm: CTA B/L  	CV: S1S2  	Abd: Soft, +BS  	Ext: No LE edema B/L              Neuro: Awake, following commands today  	Skin: Warm and Dry   	Vascular access: [facility] in place, dressing c/d/i    LABS/STUDIES  --------------------------------------------------------------------------------              7.7    18.77 >-----------<  499      [12-25-19 @ 00:29]              24.4     136  |  95  |  54  ----------------------------<  221      [12-25-19 @ 00:29]  4.8   |  22  |  4.77        Ca     8.5     [12-25-19 @ 00:29]      Mg     2.2     [12-25-19 @ 00:29]      Phos  5.1     [12-25-19 @ 00:29]    TPro  7.0  /  Alb  2.7  /  TBili  0.3  /  DBili  <0.1  /  AST  21  /  ALT  16  /  AlkPhos  59  [12-24-19 @ 10:07]          Creatinine Trend:  SCr 4.77 [12-25 @ 00:29]  SCr 7.00 [12-24 @ 01:15]  SCr 3.60 [12-23 @ 00:18]  SCr 6.90 [12-22 @ 00:08]  SCr 5.45 [12-21 @ 09:29]    Urinalysis - [12-24-19 @ 01:57]      Color Yellow / Appearance Clear / SG 1.017 / pH 6.5      Gluc 500 mg/dL / Ketone Negative  / Bili Negative / Urobili Negative       Blood Small / Protein 300 mg/dL / Leuk Est Negative / Nitrite Negative      RBC 3 / WBC 4 / Hyaline 1 / Gran  / Sq Epi  / Non Sq Epi 1 / Bacteria Negative      Iron 26, TIBC 205, %sat 13      [06-04-19 @ 09:27]  Ferritin 99      [06-04-19 @ 09:33]  PTH -- (Ca 6.9)      [06-05-19 @ 08:14]   562  HbA1c 8.4      [12-15-19 @ 11:00]  TSH 0.88      [12-19-19 @ 10:33]  Lipid: chol 134, , HDL 56, LDL 52      [12-15-19 @ 10:56]    HBsAb >1000.0      [12-14-19 @ 23:48]  HBsAg Nonreact      [12-14-19 @ 23:48]  HCV 0.15, Nonreact      [12-14-19 @ 23:48]

## 2019-12-25 NOTE — PROGRESS NOTE ADULT - SUBJECTIVE AND OBJECTIVE BOX
VASCULAR NEUROLOGY ATTENDING NOTE    Overnight Events: none    VITALS:  Vital Signs Last 24 Hrs  T(C): 37.3 (25 Dec 2019 07:00), Max: 38.5 (24 Dec 2019 23:00)  T(F): 99.1 (25 Dec 2019 07:00), Max: 101.3 (24 Dec 2019 23:00)  HR: 83 (25 Dec 2019 09:00) (80 - 100)  BP: 140/65 (25 Dec 2019 09:00) (110/53 - 166/72)  BP(mean): 94 (25 Dec 2019 09:00) (57 - 107)  RR: 16 (25 Dec 2019 09:00) (16 - 64)  SpO2: 96% (25 Dec 2019 09:00) (94% - 100%)    Labs:   12-25    136  |  95<L>  |  54<H>  ----------------------------<  221<H>  4.8   |  22  |  4.77<H>    Ca    8.5      25 Dec 2019 00:29  Phos  5.1     12-25  Mg     2.2     12-25    TPro  7.0  /  Alb  2.7<L>  /  TBili  0.3  /  DBili  <0.1  /  AST  21  /  ALT  16  /  AlkPhos  59  12-24                          7.7    18.77 )-----------( 499      ( 25 Dec 2019 00:29 )             24.4       MEDS:  albuterol/ipratropium for Nebulization 3 milliLiter(s) Nebulizer every 6 hours  aspirin  chewable 81 milliGRAM(s) Oral daily  atorvastatin 80 milliGRAM(s) Oral at bedtime  calcium acetate 667 milliGRAM(s) Oral three times a day with meals  carvedilol 3.125 milliGRAM(s) Oral every 12 hours  chlorhexidine 4% Liquid 1 Application(s) Topical <User Schedule>  clopidogrel Tablet 75 milliGRAM(s) Oral <User Schedule>  heparin  Injectable 5000 Unit(s) SubCutaneous every 12 hours  insulin lispro (HumaLOG) corrective regimen sliding scale   SubCutaneous every 6 hours  insulin NPH human recombinant 18 Unit(s) SubCutaneous every 6 hours  lacosamide IVPB 50 milliGRAM(s) IV Intermittent every 12 hours  levETIRAcetam  IVPB 250 milliGRAM(s) IV Intermittent every 12 hours  levETIRAcetam  Solution 125 milliGRAM(s) Oral daily  levothyroxine 50 MICROGram(s) Oral daily  polyethylene glycol 3350 17 Gram(s) Oral daily  senna 2 Tablet(s) Oral at bedtime  sodium chloride 0.9%. 1000 milliLiter(s) IV Continuous <Continuous>      Exam:     Eyes open attentive following commands.   No movement UE. Minimal LE.     STUDIES & IMAGING:  Studies (EKG, EEG, EMG, etc):     EEG IMPRESSION/CLINICAL CORRELATE  Abnormal EEG study.  1. Independent epileptogenic foci in the left occipital and left frontocentral region, high risk for focal onset seizures.  2. Structural abnormality in the left parasagittal region.  3. Moderate nonspecific diffuse or multifocal cerebral dysfunction.   4. No seizure seen.    Radiology (XR, CT, MR, U/S, TTE/DINAH):    < from: MR Head No Cont (12.15.19 @ 15:09) >  IMPRESSION: Numerous acute diffuse bilateral supratentorial and   infratentorial infarcts.

## 2019-12-25 NOTE — PROGRESS NOTE ADULT - ATTENDING COMMENTS
VASCULAR NEUROLOGY ATTENDING  The patient is seen and examined the history and imaging are reviewed. I agree with the resident note unless otherwise noted. Patient with extensive bihemispheric infarcts in the setting of bilateral carotid stenosis and hypoperfusion. Functional quadriplegia on exam. Agree with ASA Plavix. Agree with AEDs. Supportive care.

## 2019-12-25 NOTE — PROGRESS NOTE ADULT - SUBJECTIVE AND OBJECTIVE BOX
SUMMARY:  From notes,   "63 yo M with h/o CAD s/p stent 2009 and abnormal cath 9/2018 with 90% pRCA lesion (not intervened upon) with placement of drug eluting stent everolimus Synergy  3x 28 mm in June 2019, HTN, DM,  L carotid stenosis, hypothyroidism and CKD stage 5  (M-W-FRI  awaiting renal transplant expected within 3 months. Pt presented  to the ER for altered mental status. EMS contacted by wife when pt noted unresponsive, aphasic and not moving extremities; pt Rx'd with TPA. 12/14 pt had CT neck/head; read as SAH. Early am 12/15 pt with Sz and started on Keppra.12/15 pt had MR head: Numerous acute diffuse bilateral supratentorial and infratentorial infarcts. CT/MR head reviewed with radiology. Pt did not have SAH it was contrast from the studies. Poor MS 2 to multiple cerebral infarcts. During HD pt went into A fib which may be the possible cause of the strokes. s/p intubation 12/16 for airway protection"    *Unresponsive during dialysis 12/13 then to ED. SBPs 240s dropped to 140s on cardene in ED.     Per signout, patient was in NCSE whenever stat EEG was checked at OSH (x 3). He was transferred to [facility] for continuous EEG monitoring. Patient was noted to be Keppra 250 iv q12, Vimpat 50 iv q12 and Phenobarb 100 iv q8H, and was transferred on Versed gtt (BP not tolerate propofol gtt)    *** HIGH RISK OF DVT PRESENT ON ADMISSION ***    Overnight Events: No further episodes of hypotension. Has been afebrile.   Secretions better per night team.     ROS: Negative unless specified    VITALS:   T(C): 36.9 (12-25-19 @ 03:00), Max: 38.5 (12-24-19 @ 23:00)  HR: 93 (12-25-19 @ 07:00) (79 - 100)  BP: 150/67 (12-25-19 @ 07:00) (110/53 - 166/72)  RR: 20 (12-25-19 @ 07:00) (17 - 64)  SpO2: 98% (12-25-19 @ 07:00) (94% - 100%)    12-24-19 @ 07:01  -  12-25-19 @ 07:00  --------------------------------------------------------  IN: 1630 mL / OUT: 1700 mL / NET: -70 mL      LABS:  ABG - ( 24 Dec 2019 01:12 )  pH, Arterial: 7.45  pH, Blood: x     /  pCO2: 38    /  pO2: 98    / HCO3: 26    / Base Excess: 2.6   /  SaO2: 97                            7.7    18.77 )-----------( 499      ( 25 Dec 2019 00:29 )             24.4     12-25    136  |  95<L>  |  54<H>  ----------------------------<  221<H>  4.8   |  22  |  4.77<H>    Ca    8.5      25 Dec 2019 00:29  Phos  5.1     12-25  Mg     2.2     12-25    TPro  7.0  /  Alb  2.7<L>  /  TBili  0.3  /  DBili  <0.1  /  AST  21  /  ALT  16  /  AlkPhos  59  12-24      MEDS:  MEDICATIONS  (STANDING):  albuterol/ipratropium for Nebulization 3 milliLiter(s) Nebulizer every 6 hours  aspirin  chewable 81 milliGRAM(s) Oral daily  atorvastatin 80 milliGRAM(s) Oral at bedtime  calcium acetate 667 milliGRAM(s) Oral three times a day with meals  carvedilol 3.125 milliGRAM(s) Oral every 12 hours  chlorhexidine 4% Liquid 1 Application(s) Topical <User Schedule>  clopidogrel Tablet 75 milliGRAM(s) Oral <User Schedule>  heparin  Injectable 5000 Unit(s) SubCutaneous every 12 hours  insulin lispro (HumaLOG) corrective regimen sliding scale   SubCutaneous every 6 hours  insulin NPH human recombinant 18 Unit(s) SubCutaneous every 6 hours  lacosamide IVPB 50 milliGRAM(s) IV Intermittent every 12 hours  levETIRAcetam  IVPB 250 milliGRAM(s) IV Intermittent every 12 hours  levETIRAcetam  Solution 125 milliGRAM(s) Oral daily  levothyroxine 50 MICROGram(s) Oral daily  polyethylene glycol 3350 17 Gram(s) Oral daily  senna 2 Tablet(s) Oral at bedtime  sodium chloride 0.9%. 1000 milliLiter(s) (50 mL/Hr) IV Continuous <Continuous>    EXAMINATION:  General:  calm,   HEENT:  MMM  Neuro:  EO spont,  R sided neglect Oriented to location only  PERRL 2mm, VOR intact, face symmetric  No movement in bilat upper ext  FC b/l LE (L > R); bent L knee, moved feet on R  Cards:  RRR  Respiratory: CTAB. Limited exam  Abdomen:  soft  Extremities:  no edema  Skin:  warm/dry SUMMARY:  From notes,   "63 yo M with h/o CAD s/p stent 2009 and abnormal cath 9/2018 with 90% pRCA lesion (not intervened upon) with placement of drug eluting stent everolimus Synergy  3x 28 mm in June 2019, HTN, DM,  L carotid stenosis, hypothyroidism and CKD stage 5  (M-W-FRI  awaiting renal transplant expected within 3 months. Pt presented  to the ER for altered mental status. EMS contacted by wife when pt noted unresponsive, aphasic and not moving extremities; pt Rx'd with TPA. 12/14 pt had CT neck/head; read as SAH. Early am 12/15 pt with Sz and started on Keppra.12/15 pt had MR head: Numerous acute diffuse bilateral supratentorial and infratentorial infarcts. CT/MR head reviewed with radiology. Pt did not have SAH it was contrast from the studies. Poor MS 2 to multiple cerebral infarcts. During HD pt went into A fib which may be the possible cause of the strokes. s/p intubation 12/16 for airway protection"    *Unresponsive during dialysis 12/13 then to ED. SBPs 240s dropped to 140s on cardene in ED.     Per signout, patient was in NCSE whenever stat EEG was checked at OSH (x 3). He was transferred to [facility] for continuous EEG monitoring. Patient was noted to be Keppra 250 iv q12, Vimpat 50 iv q12 and Phenobarb 100 iv q8H, and was transferred on Versed gtt (BP not tolerate propofol gtt)    *** HIGH RISK OF DVT PRESENT ON ADMISSION ***    Overnight Events: No further episodes of hypotension. Has been afebrile.   Secretions better per night team. More awake this AM.     ROS: Negative unless specified    VITALS:   T(C): 36.9 (12-25-19 @ 03:00), Max: 38.5 (12-24-19 @ 23:00)  HR: 93 (12-25-19 @ 07:00) (79 - 100)  BP: 150/67 (12-25-19 @ 07:00) (110/53 - 166/72)  RR: 20 (12-25-19 @ 07:00) (17 - 64)  SpO2: 98% (12-25-19 @ 07:00) (94% - 100%)    12-24-19 @ 07:01  -  12-25-19 @ 07:00  --------------------------------------------------------  IN: 1630 mL / OUT: 1700 mL / NET: -70 mL      LABS:  ABG - ( 24 Dec 2019 01:12 )  pH, Arterial: 7.45  pH, Blood: x     /  pCO2: 38    /  pO2: 98    / HCO3: 26    / Base Excess: 2.6   /  SaO2: 97                            7.7    18.77 )-----------( 499      ( 25 Dec 2019 00:29 )             24.4     12-25    136  |  95<L>  |  54<H>  ----------------------------<  221<H>  4.8   |  22  |  4.77<H>    Ca    8.5      25 Dec 2019 00:29  Phos  5.1     12-25  Mg     2.2     12-25    TPro  7.0  /  Alb  2.7<L>  /  TBili  0.3  /  DBili  <0.1  /  AST  21  /  ALT  16  /  AlkPhos  59  12-24      MEDS:  MEDICATIONS  (STANDING):  albuterol/ipratropium for Nebulization 3 milliLiter(s) Nebulizer every 6 hours  aspirin  chewable 81 milliGRAM(s) Oral daily  atorvastatin 80 milliGRAM(s) Oral at bedtime  calcium acetate 667 milliGRAM(s) Oral three times a day with meals  carvedilol 3.125 milliGRAM(s) Oral every 12 hours  chlorhexidine 4% Liquid 1 Application(s) Topical <User Schedule>  clopidogrel Tablet 75 milliGRAM(s) Oral <User Schedule>  heparin  Injectable 5000 Unit(s) SubCutaneous every 12 hours  insulin lispro (HumaLOG) corrective regimen sliding scale   SubCutaneous every 6 hours  insulin NPH human recombinant 18 Unit(s) SubCutaneous every 6 hours  lacosamide IVPB 50 milliGRAM(s) IV Intermittent every 12 hours  levETIRAcetam  IVPB 250 milliGRAM(s) IV Intermittent every 12 hours  levETIRAcetam  Solution 125 milliGRAM(s) Oral daily  levothyroxine 50 MICROGram(s) Oral daily  polyethylene glycol 3350 17 Gram(s) Oral daily  senna 2 Tablet(s) Oral at bedtime  sodium chloride 0.9%. 1000 milliLiter(s) (50 mL/Hr) IV Continuous <Continuous>    EXAMINATION:  General:  calm,   HEENT:  MMM  Neuro:  A&O x2. Face symmetric.   No movement in bilat upper ext  b/l LE 5/5. FC.   Cards:  RRR  Respiratory: CTAB. Limited exam  Abdomen:  soft  Extremities:  no edema  Skin:  warm/dry SUMMARY:  From notes,   "65 yo M with h/o CAD s/p stent 2009 and abnormal cath 9/2018 with 90% pRCA lesion (not intervened upon) with placement of drug eluting stent everolimus Synergy  3x 28 mm in June 2019, HTN, DM,  L carotid stenosis, hypothyroidism and CKD stage 5  (M-W-FRI  awaiting renal transplant expected within 3 months. Pt presented  to the ER for altered mental status. EMS contacted by wife when pt noted unresponsive, aphasic and not moving extremities; pt Rx'd with TPA. 12/14 pt had CT neck/head; read as SAH. Early am 12/15 pt with Sz and started on Keppra.12/15 pt had MR head: Numerous acute diffuse bilateral supratentorial and infratentorial infarcts. CT/MR head reviewed with radiology. Pt did not have SAH it was contrast from the studies. Poor MS 2 to multiple cerebral infarcts. During HD pt went into A fib which may be the possible cause of the strokes. s/p intubation 12/16 for airway protection"    *Unresponsive during dialysis 12/13 then to ED. SBPs 240s dropped to 140s on cardene in ED.     Per signout, patient was in NCSE whenever stat EEG was checked at OSH (x 3). He was transferred to [facility] for continuous EEG monitoring. Patient was noted to be Keppra 250 iv q12, Vimpat 50 iv q12 and Phenobarb 100 iv q8H, and was transferred on Versed gtt (BP not tolerate propofol gtt)    *** HIGH RISK OF DVT PRESENT ON ADMISSION ***    Overnight Events: No further episodes of hypotension. Has been afebrile.   Secretions better per night team. More awake this AM.     ROS: Negative unless specified    VITALS:   T(C): 36.9 (12-25-19 @ 03:00), Max: 38.5 (12-24-19 @ 23:00)  HR: 93 (12-25-19 @ 07:00) (79 - 100)  BP: 150/67 (12-25-19 @ 07:00) (110/53 - 166/72)  RR: 20 (12-25-19 @ 07:00) (17 - 64)  SpO2: 98% (12-25-19 @ 07:00) (94% - 100%)    12-24-19 @ 07:01  -  12-25-19 @ 07:00  --------------------------------------------------------  IN: 1630 mL / OUT: 1700 mL / NET: -70 mL      LABS:  ABG - ( 24 Dec 2019 01:12 )  pH, Arterial: 7.45  pH, Blood: x     /  pCO2: 38    /  pO2: 98    / HCO3: 26    / Base Excess: 2.6   /  SaO2: 97                            7.7    18.77 )-----------( 499      ( 25 Dec 2019 00:29 )             24.4     12-25    136  |  95<L>  |  54<H>  ----------------------------<  221<H>  4.8   |  22  |  4.77<H>    Ca    8.5      25 Dec 2019 00:29  Phos  5.1     12-25  Mg     2.2     12-25    TPro  7.0  /  Alb  2.7<L>  /  TBili  0.3  /  DBili  <0.1  /  AST  21  /  ALT  16  /  AlkPhos  59  12-24      MEDS:  MEDICATIONS  (STANDING):  albuterol/ipratropium for Nebulization 3 milliLiter(s) Nebulizer every 6 hours  aspirin  chewable 81 milliGRAM(s) Oral daily  atorvastatin 80 milliGRAM(s) Oral at bedtime  calcium acetate 667 milliGRAM(s) Oral three times a day with meals  carvedilol 3.125 milliGRAM(s) Oral every 12 hours  chlorhexidine 4% Liquid 1 Application(s) Topical <User Schedule>  clopidogrel Tablet 75 milliGRAM(s) Oral <User Schedule>  heparin  Injectable 5000 Unit(s) SubCutaneous every 12 hours  insulin lispro (HumaLOG) corrective regimen sliding scale   SubCutaneous every 6 hours  insulin NPH human recombinant 18 Unit(s) SubCutaneous every 6 hours  lacosamide IVPB 50 milliGRAM(s) IV Intermittent every 12 hours  levETIRAcetam  IVPB 250 milliGRAM(s) IV Intermittent every 12 hours  levETIRAcetam  Solution 125 milliGRAM(s) Oral daily  levothyroxine 50 MICROGram(s) Oral daily  polyethylene glycol 3350 17 Gram(s) Oral daily  senna 2 Tablet(s) Oral at bedtime  sodium chloride 0.9%. 1000 milliLiter(s) (50 mL/Hr) IV Continuous <Continuous>    EXAMINATION:  General:  calm,   HEENT:  MMM  Neuro:  A&O x2 (knew name and location, but not year or month). Face symmetric.   No movement in bilat upper ext. Noxious stimuli deferred.   b/l LE 5/5. FC.   Cards:  RRR  Respiratory: CTAB. Limited exam  Abdomen:  soft  Extremities:  no edema  Skin:  warm/dry

## 2019-12-26 DIAGNOSIS — D64.9 ANEMIA, UNSPECIFIED: ICD-10-CM

## 2019-12-26 DIAGNOSIS — G40.901 EPILEPSY, UNSPECIFIED, NOT INTRACTABLE, WITH STATUS EPILEPTICUS: ICD-10-CM

## 2019-12-26 DIAGNOSIS — E11.59 TYPE 2 DIABETES MELLITUS WITH OTHER CIRCULATORY COMPLICATIONS: ICD-10-CM

## 2019-12-26 DIAGNOSIS — D72.829 ELEVATED WHITE BLOOD CELL COUNT, UNSPECIFIED: ICD-10-CM

## 2019-12-26 DIAGNOSIS — I63.9 CEREBRAL INFARCTION, UNSPECIFIED: ICD-10-CM

## 2019-12-26 DIAGNOSIS — Z71.89 OTHER SPECIFIED COUNSELING: ICD-10-CM

## 2019-12-26 DIAGNOSIS — I63.40 CEREBRAL INFARCTION DUE TO EMBOLISM OF UNSPECIFIED CEREBRAL ARTERY: ICD-10-CM

## 2019-12-26 DIAGNOSIS — R05 COUGH: ICD-10-CM

## 2019-12-26 DIAGNOSIS — I48.91 UNSPECIFIED ATRIAL FIBRILLATION: ICD-10-CM

## 2019-12-26 DIAGNOSIS — N18.6 END STAGE RENAL DISEASE: ICD-10-CM

## 2019-12-26 DIAGNOSIS — I25.10 ATHEROSCLEROTIC HEART DISEASE OF NATIVE CORONARY ARTERY WITHOUT ANGINA PECTORIS: ICD-10-CM

## 2019-12-26 DIAGNOSIS — R13.10 DYSPHAGIA, UNSPECIFIED: ICD-10-CM

## 2019-12-26 LAB
-  AMPICILLIN/SULBACTAM: SIGNIFICANT CHANGE UP
-  CEFAZOLIN: SIGNIFICANT CHANGE UP
-  CLINDAMYCIN: SIGNIFICANT CHANGE UP
-  ERYTHROMYCIN: SIGNIFICANT CHANGE UP
-  GENTAMICIN: SIGNIFICANT CHANGE UP
-  OXACILLIN: SIGNIFICANT CHANGE UP
-  PENICILLIN: SIGNIFICANT CHANGE UP
-  RIFAMPIN: SIGNIFICANT CHANGE UP
-  TETRACYCLINE: SIGNIFICANT CHANGE UP
-  TRIMETHOPRIM/SULFAMETHOXAZOLE: SIGNIFICANT CHANGE UP
-  VANCOMYCIN: SIGNIFICANT CHANGE UP
ANION GAP SERPL CALC-SCNC: 27 MMOL/L — HIGH (ref 5–17)
BLD GP AB SCN SERPL QL: NEGATIVE — SIGNIFICANT CHANGE UP
BUN SERPL-MCNC: 95 MG/DL — HIGH (ref 7–23)
CALCIUM SERPL-MCNC: 8.4 MG/DL — SIGNIFICANT CHANGE UP (ref 8.4–10.5)
CHLORIDE SERPL-SCNC: 96 MMOL/L — SIGNIFICANT CHANGE UP (ref 96–108)
CO2 SERPL-SCNC: 15 MMOL/L — LOW (ref 22–31)
CREAT SERPL-MCNC: 7.6 MG/DL — HIGH (ref 0.5–1.3)
GLUCOSE SERPL-MCNC: 182 MG/DL — HIGH (ref 70–99)
HCT VFR BLD CALC: 22.3 % — LOW (ref 39–50)
HCT VFR BLD CALC: 22.5 % — LOW (ref 39–50)
HGB BLD-MCNC: 7 G/DL — CRITICAL LOW (ref 13–17)
HGB BLD-MCNC: 7.1 G/DL — LOW (ref 13–17)
MCHC RBC-ENTMCNC: 29.4 PG — SIGNIFICANT CHANGE UP (ref 27–34)
MCHC RBC-ENTMCNC: 29.6 PG — SIGNIFICANT CHANGE UP (ref 27–34)
MCHC RBC-ENTMCNC: 31.1 GM/DL — LOW (ref 32–36)
MCHC RBC-ENTMCNC: 31.8 GM/DL — LOW (ref 32–36)
MCV RBC AUTO: 92.9 FL — SIGNIFICANT CHANGE UP (ref 80–100)
MCV RBC AUTO: 94.5 FL — SIGNIFICANT CHANGE UP (ref 80–100)
METHOD TYPE: SIGNIFICANT CHANGE UP
NRBC # BLD: 0 /100 WBCS — SIGNIFICANT CHANGE UP (ref 0–0)
NRBC # BLD: 0 /100 WBCS — SIGNIFICANT CHANGE UP (ref 0–0)
PLATELET # BLD AUTO: 522 K/UL — HIGH (ref 150–400)
PLATELET # BLD AUTO: 532 K/UL — HIGH (ref 150–400)
POTASSIUM SERPL-MCNC: 5 MMOL/L — SIGNIFICANT CHANGE UP (ref 3.5–5.3)
POTASSIUM SERPL-SCNC: 5 MMOL/L — SIGNIFICANT CHANGE UP (ref 3.5–5.3)
RAPID RVP RESULT: SIGNIFICANT CHANGE UP
RBC # BLD: 2.25 M/UL — LOW (ref 4.2–5.8)
RBC # BLD: 2.38 M/UL — LOW (ref 4.2–5.8)
RBC # BLD: 2.4 M/UL — LOW (ref 4.2–5.8)
RBC # FLD: 16.2 % — HIGH (ref 10.3–14.5)
RBC # FLD: 16.3 % — HIGH (ref 10.3–14.5)
RETICS #: 41.6 K/UL — SIGNIFICANT CHANGE UP (ref 25–125)
RETICS/RBC NFR: 1.9 % — SIGNIFICANT CHANGE UP (ref 0.5–2.5)
RH IG SCN BLD-IMP: POSITIVE — SIGNIFICANT CHANGE UP
SODIUM SERPL-SCNC: 138 MMOL/L — SIGNIFICANT CHANGE UP (ref 135–145)
WBC # BLD: 14.57 K/UL — HIGH (ref 3.8–10.5)
WBC # BLD: 16.08 K/UL — HIGH (ref 3.8–10.5)
WBC # FLD AUTO: 14.57 K/UL — HIGH (ref 3.8–10.5)
WBC # FLD AUTO: 16.08 K/UL — HIGH (ref 3.8–10.5)

## 2019-12-26 PROCEDURE — 99222 1ST HOSP IP/OBS MODERATE 55: CPT | Mod: GC

## 2019-12-26 PROCEDURE — 71045 X-RAY EXAM CHEST 1 VIEW: CPT | Mod: 26

## 2019-12-26 PROCEDURE — 99233 SBSQ HOSP IP/OBS HIGH 50: CPT | Mod: GC

## 2019-12-26 RX ORDER — CEFEPIME 1 G/1
500 INJECTION, POWDER, FOR SOLUTION INTRAMUSCULAR; INTRAVENOUS DAILY
Refills: 0 | Status: DISCONTINUED | OUTPATIENT
Start: 2019-12-26 | End: 2019-12-26

## 2019-12-26 RX ORDER — ACETAMINOPHEN 500 MG
1000 TABLET ORAL ONCE
Refills: 0 | Status: COMPLETED | OUTPATIENT
Start: 2019-12-26 | End: 2019-12-26

## 2019-12-26 RX ADMIN — HEPARIN SODIUM 5000 UNIT(S): 5000 INJECTION INTRAVENOUS; SUBCUTANEOUS at 05:22

## 2019-12-26 RX ADMIN — LEVETIRACETAM 400 MILLIGRAM(S): 250 TABLET, FILM COATED ORAL at 21:54

## 2019-12-26 RX ADMIN — SODIUM CHLORIDE 45 MILLILITER(S): 9 INJECTION INTRAMUSCULAR; INTRAVENOUS; SUBCUTANEOUS at 05:28

## 2019-12-26 RX ADMIN — SENNA PLUS 2 TABLET(S): 8.6 TABLET ORAL at 22:15

## 2019-12-26 RX ADMIN — LACOSAMIDE 110 MILLIGRAM(S): 50 TABLET ORAL at 05:41

## 2019-12-26 RX ADMIN — LACOSAMIDE 110 MILLIGRAM(S): 50 TABLET ORAL at 21:51

## 2019-12-26 RX ADMIN — Medication 3 MILLILITER(S): at 22:02

## 2019-12-26 RX ADMIN — Medication 4: at 05:41

## 2019-12-26 RX ADMIN — CLOPIDOGREL BISULFATE 75 MILLIGRAM(S): 75 TABLET, FILM COATED ORAL at 22:14

## 2019-12-26 RX ADMIN — CHLORHEXIDINE GLUCONATE 1 APPLICATION(S): 213 SOLUTION TOPICAL at 05:42

## 2019-12-26 RX ADMIN — HUMAN INSULIN 18 UNIT(S): 100 INJECTION, SUSPENSION SUBCUTANEOUS at 15:30

## 2019-12-26 RX ADMIN — CARVEDILOL PHOSPHATE 3.12 MILLIGRAM(S): 80 CAPSULE, EXTENDED RELEASE ORAL at 22:14

## 2019-12-26 RX ADMIN — SODIUM CHLORIDE 45 MILLILITER(S): 9 INJECTION INTRAMUSCULAR; INTRAVENOUS; SUBCUTANEOUS at 17:21

## 2019-12-26 RX ADMIN — Medication 3 MILLILITER(S): at 12:00

## 2019-12-26 RX ADMIN — CARVEDILOL PHOSPHATE 3.12 MILLIGRAM(S): 80 CAPSULE, EXTENDED RELEASE ORAL at 05:22

## 2019-12-26 RX ADMIN — Medication 50 MICROGRAM(S): at 05:26

## 2019-12-26 RX ADMIN — LEVETIRACETAM 125 MILLIGRAM(S): 250 TABLET, FILM COATED ORAL at 22:21

## 2019-12-26 RX ADMIN — Medication 81 MILLIGRAM(S): at 12:09

## 2019-12-26 RX ADMIN — LEVETIRACETAM 400 MILLIGRAM(S): 250 TABLET, FILM COATED ORAL at 05:15

## 2019-12-26 RX ADMIN — ATORVASTATIN CALCIUM 80 MILLIGRAM(S): 80 TABLET, FILM COATED ORAL at 22:14

## 2019-12-26 RX ADMIN — Medication 3 MILLILITER(S): at 05:28

## 2019-12-26 NOTE — DIETITIAN INITIAL EVALUATION ADULT. - PERTINENT MEDS FT
MEDICATIONS  (STANDING):  acetaminophen  IVPB .. 1000 milliGRAM(s) IV Intermittent once  albuterol/ipratropium for Nebulization 3 milliLiter(s) Nebulizer every 6 hours  aspirin  chewable 81 milliGRAM(s) Oral daily  atorvastatin 80 milliGRAM(s) Oral at bedtime  calcium acetate 667 milliGRAM(s) Oral three times a day with meals  carvedilol 3.125 milliGRAM(s) Oral every 12 hours  chlorhexidine 4% Liquid 1 Application(s) Topical <User Schedule>  clopidogrel Tablet 75 milliGRAM(s) Oral <User Schedule>  heparin  Injectable 5000 Unit(s) SubCutaneous every 12 hours  insulin lispro (HumaLOG) corrective regimen sliding scale   SubCutaneous every 6 hours  insulin NPH human recombinant 18 Unit(s) SubCutaneous every 6 hours  lacosamide IVPB 50 milliGRAM(s) IV Intermittent every 12 hours  levETIRAcetam  IVPB 250 milliGRAM(s) IV Intermittent every 12 hours  levETIRAcetam  Solution 125 milliGRAM(s) Oral daily  levothyroxine 50 MICROGram(s) Oral daily  polyethylene glycol 3350 17 Gram(s) Oral daily  senna 2 Tablet(s) Oral at bedtime  sodium chloride 0.9%. 1000 milliLiter(s) (45 mL/Hr) IV Continuous <Continuous>    MEDICATIONS  (PRN):  acetaminophen    Suspension .. 650 milliGRAM(s) Oral every 6 hours PRN Temp greater or equal to 38.5C (101.3F)

## 2019-12-26 NOTE — CONSULT NOTE ADULT - PROBLEM SELECTOR RECOMMENDATION 3
Advanced care planning was discussed with patient and family.  Advanced care planning forms were reviewed and discussed.  Risks, benefits and alternatives of gastroenterologic procedures were discussed in detail and all questions were answered.    30 minutes spent.
Blood cultures, limited flu swab negative   -Please check CBC with diff  -Consider checking UA/urine culture  -Sputum culture: MSSA and Enterobacter but no radiographic evidence of PNA. Likely colonizing
EEG with "Independent epileptogenic foci in the left occipital and left frontocentral "  -cont keppra and vimpat as per neuro.

## 2019-12-26 NOTE — CONSULT NOTE ADULT - ASSESSMENT
64 year old male that presented to outside hospital with altered mental status and treated with tPA, now transferred to [facility] with status epilepticus. 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 GI consulted for dysphagia.

## 2019-12-26 NOTE — DIETITIAN INITIAL EVALUATION ADULT. - OTHER INFO
Pt seen for: Stroke Unit length of stay   Adm dx: CVA, seizure. ESRD last HD 12/24    GI issues: no N/V  Last BM: fecal incontinence 12/20 (on bowel regimen)    Food allergies/Intolerances:  NKFA   Vit/supplement PTA: none noted    Diet PTA: unable to assess at this time      Subjective/Objective information: pt lethargic, unable to participate in interview at this time, no visitors at bedside     Education: Not indicated at this time reassess education needs when pt /family able to participate A1c 8.4 12/15

## 2019-12-26 NOTE — PROGRESS NOTE ADULT - ASSESSMENT
64 year old male that presented to outside hospital with altered mental status and treated with tPA, now transferred to [facility] with status epilepticus. 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     NEURO: Continue close monitoring for neurologic deterioration, permissive HTN to gradual normotension, continue on high dose statin, MRI Brain w/o contrast noted above. Physical therapy/OT pending    ANTITHROMBOTIC THERAPY: ASA and Plavix     PULMONARY: CXR clear on 12/23, protecting airway, saturating well     CARDIOVASCULAR: TTE Left ventricular ejection fraction, by visual estimation, is 60 to 65%. Trace mitral valve regurgitation., cardiac monitoring shows Afib at times, currently in NSR.                             SBP goal: Normotension, avoid hypotension    GASTROINTESTINAL:  dysphagia screen failed.      Diet: NPO with TF.    RENAL: ESRD on HD M/W/F, s/p HD on 12/24, next HD on 12/27. Keppra to be dosed post HD days.      Na Goal: Greater than 135     Miranda: No    HEMATOLOGY: H/H 7.0/22.5, Anemia likely in setting of ESRD. Platelets 532- thrombocytosis ; LE doppler negative for DVT on 12/20.     DVT ppx: Heparin s.c [] LMWH []     ID: afebrile, leukocytosis Sputum cultures showed numerous Staph aureus, Moderate Enterobacter. Normal Respiratory giovanni. Monitor off abx for now. CT Chest/Abd/Pelvis on 12/24 negative for source of infection.     OTHER: Plan endorsed to    DISPOSITION: Rehab or home depending on PT eval once stable and workup is complete    CORE MEASURES:        Admission NIHSS:      TPA: YES at OSH      LDL/HDL: 52/56     Depression Screen: P     Statin Therapy: YES     Dysphagia Screen: FAIL     Smoking  NO      Afib YES     Stroke Education YES    Obtain screening lower extremity venous ultrasound in patients who meet 1 or more of the following criteria as patient is high risk for DVT/PE on admission:   [] History of DVT/PE  []Hypercoagulable states (Factor V Leiden, Cancer, OCP, etc. )  []Prolonged immobility (hemiplegia/hemiparesis/post operative or any other extended immobilization)  [] Transferred from outside facility (Rehab or Long term care)  [] Age </= to 50 64 year old male that presented to outside hospital with altered mental status and treated with tPA, now transferred to [facility] with status epilepticus. 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     NEURO: Continue close monitoring for neurologic deterioration, permissive HTN to gradual normotension, continue on high dose statin. Found to be in status epilepticus at OSH- continue on vimpat, Keppra (redose post HD). Carotid dopplers showed: R ICA 70-99% stenosis; L ICA 50-69% stenosis. Continue medical management. MRI Brain w/o contrast noted above. OT for splints. Physical therapy pending.     ANTITHROMBOTIC THERAPY: ASA and Plavix in setting of history of CAD and in setting of carotid stenosis.     PULMONARY: CXR clear on 12/23, protecting airway, saturating well. Continue aggressive suctioning for      CARDIOVASCULAR: TTE Left ventricular ejection fraction, by visual estimation, is 60 to 65%. Trace mitral valve regurgitation., cardiac monitoring shows Afib at times, currently in NSR.                             SBP goal: Normotension, avoid hypotension    GASTROINTESTINAL:  dysphagia screen failed. Plan to consult GI for PEG.     Diet: NPO with TF.    RENAL: ESRD on HD M/W/F, s/p HD on 12/24, next HD on 12/26. Keppra to be dosed post HD days.      Na Goal: Greater than 135     Miranda: No    HEMATOLOGY: H/H 7.0/22.5, Anemia likely in setting of ESRD. Will give 1 unit of PRBC. Platelets 532- thrombocytosis ; LE doppler negative for DVT on 12/20.     DVT ppx: Heparin s.c [] LMWH []     ID: afebrile, leukocytosis Sputum cultures showed numerous Staph aureus, Moderate Enterobacter. Normal Respiratory giovanni. Monitor off abx for now. CT Chest/Abd/Pelvis on 12/24 negative for source of infection. ID consult pending.    OTHER: Plan endorsed to patient and son over the phone. All questions and concerns addressed. Medicine consulted.     DISPOSITION: Rehab or home depending on PT eval once stable and workup is complete    CORE MEASURES:        Admission NIHSS:      TPA: YES at OSH      LDL/HDL: 52/56     Depression Screen: P     Statin Therapy: YES     Dysphagia Screen: FAIL     Smoking  NO      Afib YES     Stroke Education YES    Obtain screening lower extremity venous ultrasound in patients who meet 1 or more of the following criteria as patient is high risk for DVT/PE on admission:   [] History of DVT/PE  []Hypercoagulable states (Factor V Leiden, Cancer, OCP, etc. )  []Prolonged immobility (hemiplegia/hemiparesis/post operative or any other extended immobilization)  [] Transferred from outside facility (Rehab or Long term care)  [] Age </= to 50 64 year old male that presented to outside hospital with altered mental status and treated with tPA, now transferred to [facility] with status epilepticus. 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     NEURO: Continue close monitoring for neurologic deterioration, permissive HTN to gradual normotension, continue on high dose statin. Found to be in status epilepticus at OSH- continue on vimpat, Keppra (redose post HD). Carotid dopplers showed: R ICA 70-99% stenosis; L ICA 50-69% stenosis. Continue medical management. MRI Brain w/o contrast noted above. OT for splints. Physical therapy pending.     ANTITHROMBOTIC THERAPY: ASA and Plavix in setting of history of CAD and in setting of carotid stenosis.     PULMONARY: CXR clear on 12/23, protecting airway, saturating well. Continue aggressive suctioning. Pulmonary consulted.      CARDIOVASCULAR: TTE Left ventricular ejection fraction, by visual estimation, is 60 to 65%. Trace mitral valve regurgitation. Cardiac monitoring shows Afib at times, currently in NSR.                             SBP goal: Normotension, avoid hypotension    GASTROINTESTINAL:  dysphagia screen failed. S&S evaluation pending, Plan to consult GI for PEG.     Diet: NPO with TF.    RENAL: ESRD on HD M/W/F, s/p HD on 12/24, next HD on 12/26. Keppra dosed post HD days.      Na Goal: Greater than 135     Miranda: No    HEMATOLOGY: H/H 7.0/22.5, Anemia likely in setting of ESRD. Will give 1 unit of PRBC. Platelets 532- thrombocytosis ; LE doppler negative for DVT on 12/20.     DVT ppx: Heparin s.c [] LMWH []     ID: afebrile, leukocytosis Sputum cultures showed numerous Staph aureus, Moderate Enterobacter. Normal Respiratory giovanni. Monitor off abx for now. CT Chest/Abd/Pelvis on 12/24 negative for source of infection. ID consult pending.    OTHER: Plan endorsed to patient and son over the phone. All questions and concerns addressed. Medicine consulted.     DISPOSITION: Rehab or home depending on PT eval once stable and workup is complete    CORE MEASURES:        Admission NIHSS:      TPA: YES at OSH      LDL/HDL: 52/56     Depression Screen: P     Statin Therapy: YES     Dysphagia Screen: FAIL     Smoking  NO      Afib YES     Stroke Education YES    Obtain screening lower extremity venous ultrasound in patients who meet 1 or more of the following criteria as patient is high risk for DVT/PE on admission:   [] History of DVT/PE  []Hypercoagulable states (Factor V Leiden, Cancer, OCP, etc. )  []Prolonged immobility (hemiplegia/hemiparesis/post operative or any other extended immobilization)  [] Transferred from outside facility (Rehab or Long term care)  [] Age </= to 50 64 year old male that presented to outside hospital with altered mental status and treated with tPA, now transferred to [facility] with status epilepticus. 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     NEURO: Continue close monitoring for neurologic deterioration, permissive HTN to gradual normotension, continue on high dose statin. Found to be in status epilepticus at OSH- continue on vimpat, Keppra (redose post HD). Carotid dopplers showed: R ICA 70-99% stenosis; L ICA 50-69% stenosis. Continue medical management. MRI Brain w/o contrast noted above. OT for splints. Physical therapy pending.     ANTITHROMBOTIC THERAPY: ASA and Plavix in setting of history of CAD and in setting of carotid stenosis. AC in setting of afib to be consider once stable enteral access established.     PULMONARY: CXR clear on 12/23, protecting airway, saturating well. Continue aggressive suctioning. Pulmonary consulted.      CARDIOVASCULAR: TTE Left ventricular ejection fraction, by visual estimation, is 60 to 65%. Trace mitral valve regurgitation. Cardiac monitoring shows Afib at times, currently in NSR. Plan for anticoagulation pending clinical course and when stable enteral access established.                            SBP goal: Normotension, avoid hypotension    GASTROINTESTINAL:  dysphagia screen failed. S&S evaluation pending, Plan to consult GI for PEG.     Diet: NPO with TF.    RENAL: ESRD on HD M/W/F, s/p HD on 12/24, next HD on 12/26. Keppra dosed post HD days.      Na Goal: Greater than 135     Miranda: No    HEMATOLOGY: H/H 7.0/22.5, Anemia likely in setting of ESRD. Will give 1 unit of PRBC. Platelets 532- thrombocytosis ; LE doppler negative for DVT on 12/20.     DVT ppx: Heparin s.c    ID: afebrile, leukocytosis Sputum cultures showed numerous Staph aureus, Moderate Enterobacter. Normal Respiratory giovanni. Monitor off abx for now. CT Chest/Abd/Pelvis on 12/24 negative for source of infection. ID consult pending.    OTHER: Plan endorsed to patient and son over the phone. All questions and concerns addressed. Medicine consulted.     DISPOSITION: Rehab or home depending on PT eval once stable and workup is complete    CORE MEASURES:        Admission NIHSS:      TPA: YES at OSH      LDL/HDL: 52/56     Depression Screen: P     Statin Therapy: YES     Dysphagia Screen: FAIL     Smoking  NO      Afib YES     Stroke Education YES    Obtain screening lower extremity venous ultrasound in patients who meet 1 or more of the following criteria as patient is high risk for DVT/PE on admission:   [] History of DVT/PE  []Hypercoagulable states (Factor V Leiden, Cancer, OCP, etc. )  []Prolonged immobility (hemiplegia/hemiparesis/post operative or any other extended immobilization)  [] Transferred from outside facility (Rehab or Long term care)  [] Age </= to 50 64 year old male that presented to outside hospital with altered mental status and treated with tPA, now transferred to [facility] with status epilepticus. 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     NEURO: Continue close monitoring for neurologic deterioration, permissive HTN to gradual normotension, continue on high dose statin. Found to be in status epilepticus at OSH- continue on vimpat, Keppra (redose post HD). Carotid dopplers showed: R ICA 70-99% stenosis; L ICA 50-69% stenosis. Continue medical management. MRI Brain w/o contrast noted above. OT for splints. Physical therapy pending.     ANTITHROMBOTIC THERAPY: ASA and Plavix in setting of history of CAD and in setting of carotid stenosis. AC in setting of afib to be consider once stable enteral access established.     PULMONARY: CXR clear on 12/23, protecting airway, saturating well. Continue aggressive suctioning. Pulmonary consulted.      CARDIOVASCULAR: TTE Left ventricular ejection fraction, by visual estimation, is 60 to 65%. Trace mitral valve regurgitation. Cardiac monitoring shows Afib at times, currently in NSR. Plan for anticoagulation pending clinical course and when stable enteral access established.                            SBP goal: Normotension, avoid hypotension    GASTROINTESTINAL:  dysphagia screen failed. S&S evaluation pending, Plan to consult GI for PEG.     Diet: NPO with TF.    RENAL: CKD stage V awaiting transplant on HD M/W/F, s/p HD on 12/24, next HD on 12/26. Keppra dosed post HD days.      Na Goal: Greater than 135     Miranda: No    HEMATOLOGY: H/H 7.0/22.5, Anemia likely in setting of ESRD. Will give 1 unit of PRBC. Platelets 532- thrombocytosis ; LE doppler negative for DVT on 12/20.     DVT ppx: Heparin s.c    ID: afebrile, leukocytosis Sputum cultures showed numerous Staph aureus, Moderate Enterobacter. Normal Respiratory giovanni. Monitor off abx for now. CT Chest/Abd/Pelvis on 12/24 negative for source of infection. ID consult pending.    OTHER: Plan endorsed to patient and son over the phone. All questions and concerns addressed. Medicine consulted.     DISPOSITION: Rehab or home depending on PT eval once stable and workup is complete    CORE MEASURES:        Admission NIHSS:      TPA: YES at OSH      LDL/HDL: 52/56     Depression Screen: P     Statin Therapy: YES     Dysphagia Screen: FAIL     Smoking  NO      Afib YES     Stroke Education YES    Obtain screening lower extremity venous ultrasound in patients who meet 1 or more of the following criteria as patient is high risk for DVT/PE on admission:   [] History of DVT/PE  []Hypercoagulable states (Factor V Leiden, Cancer, OCP, etc. )  []Prolonged immobility (hemiplegia/hemiparesis/post operative or any other extended immobilization)  [] Transferred from outside facility (Rehab or Long term care)  [] Age </= to 50

## 2019-12-26 NOTE — CONSULT NOTE ADULT - PROBLEM SELECTOR RECOMMENDATION 9
with white-yellow secretions  -CT chest 12/24 negative for PNA  -Sputum culture: MSSA, Enterobacter could be colonizing   -Check full RVP panel and repeat CXR  -If CXR negative for new infiltrate would consider stopping abx and watching off   -Patient is high risk for aspiration given encephalopathy, would monitor clinically with white-yellow secretions  -CT chest 12/24 negative for PNA  -Sputum culture: MSSA, Enterobacter could be colonizing   -Check full RVP panel  -Abx as per id, may observe off or short course  -Patient is high risk for aspiration given encephalopathy, would monitor clinically

## 2019-12-26 NOTE — CONSULT NOTE ADULT - PROBLEM SELECTOR RECOMMENDATION 9
Normocytic anemia likely 2/2 anemia of chronic disease in setting of ESRD. Baseline here hg likely 8-9. Nephrology following for HD. Team planing for 1U PRBC today with HD.  - Recommend eval for anemia, check b12/folate, Iron, TIBC, retic count  - monitor cbc qd Normocytic anemia likely 2/2 anemia of chronic disease in setting of ESRD. Baseline here hg likely 8-9. Nephrology following for HD. Team planing for 1U PRBC today with HD.  - Recommend eval for anemia, check b12/folate, Iron, TIBC, retic count, FOBT  - monitor cbc qd

## 2019-12-26 NOTE — PROGRESS NOTE ADULT - SUBJECTIVE AND OBJECTIVE BOX
[facility]   MD Yokasta Gary D.O. Fatima Sheikh, D.O. Ruoru Wong, PA    From 7 AM - 5 PM:  OFFICE: [phone]  Dr. Barrientos cell: [phone]  Dr. Lopes cell: [phone]  Dr. Witt cell: [phone]  WALDO Valentin cell: [phone]    From 5 PM - 7 AM: Answering Service: [phone]        RENAL FOLLOW UP NOTE  --------------------------------------------------------------------------------  HPI: Pt seen and examined. States he feels SOB today. Denies any cough, fever, chills, N/V.        PAST HISTORY  --------------------------------------------------------------------------------  No significant changes to PMH, PSH, FHx, SHx, unless otherwise noted    ALLERGIES & MEDICATIONS  --------------------------------------------------------------------------------  Allergies    No Known Allergies    Intolerances      Standing Inpatient Medications  acetaminophen  IVPB .. 1000 milliGRAM(s) IV Intermittent once  albuterol/ipratropium for Nebulization 3 milliLiter(s) Nebulizer every 6 hours  aspirin  chewable 81 milliGRAM(s) Oral daily  atorvastatin 80 milliGRAM(s) Oral at bedtime  calcium acetate 667 milliGRAM(s) Oral three times a day with meals  carvedilol 3.125 milliGRAM(s) Oral every 12 hours  chlorhexidine 4% Liquid 1 Application(s) Topical <User Schedule>  clopidogrel Tablet 75 milliGRAM(s) Oral <User Schedule>  heparin  Injectable 5000 Unit(s) SubCutaneous every 12 hours  insulin lispro (HumaLOG) corrective regimen sliding scale   SubCutaneous every 6 hours  insulin NPH human recombinant 18 Unit(s) SubCutaneous every 6 hours  lacosamide IVPB 50 milliGRAM(s) IV Intermittent every 12 hours  levETIRAcetam  IVPB 250 milliGRAM(s) IV Intermittent every 12 hours  levETIRAcetam  Solution 125 milliGRAM(s) Oral daily  levothyroxine 50 MICROGram(s) Oral daily  polyethylene glycol 3350 17 Gram(s) Oral daily  senna 2 Tablet(s) Oral at bedtime  sodium chloride 0.9%. 1000 milliLiter(s) IV Continuous <Continuous>    PRN Inpatient Medications  acetaminophen    Suspension .. 650 milliGRAM(s) Oral every 6 hours PRN      REVIEW OF SYSTEMS  --------------------------------------------------------------------------------  General: no fever  CVS: no chest pain  RESP: + sob, no cough  ABD: no abdominal pain  : no dysuria,  MSK: no edema     VITALS/PHYSICAL EXAM  --------------------------------------------------------------------------------  T(C): 36.6 (12-26-19 @ 08:30), Max: 37.8 (12-25-19 @ 15:30)  HR: 85 (12-26-19 @ 09:10) (78 - 95)  BP: 119/63 (12-26-19 @ 09:10) (104/55 - 148/65)  RR: 16 (12-26-19 @ 09:10) (13 - 22)  SpO2: 100% (12-26-19 @ 09:10) (96% - 100%)  Wt(kg): --  Height (cm): 177.8 (12-25-19 @ 11:00)      12-25-19 @ 07:01  -  12-26-19 @ 07:00  --------------------------------------------------------  IN: 1190 mL / OUT: 300 mL / NET: 890 mL      Physical Exam:  	Gen: NAD  	HEENT: MMM  	Pulm: CTA B/L  	CV: S1S2  	Abd: Soft, +BS  	Ext: No LE edema B/L              Neuro: Awake, following commands, coherent speech  	Skin: Warm and Dry   	Vascular access: [facility] in place, dressing c/d/i    LABS/STUDIES  --------------------------------------------------------------------------------              7.1    14.57 >-----------<  522      [12-26-19 @ 09:43]              22.3     138  |  96  |  95  ----------------------------<  182      [12-26-19 @ 05:40]  5.0   |  15  |  7.60        Ca     8.4     [12-26-19 @ 05:40]      Mg     2.2     [12-25-19 @ 00:29]      Phos  5.1     [12-25-19 @ 00:29]            Creatinine Trend:  SCr 7.60 [12-26 @ 05:40]  SCr 4.77 [12-25 @ 00:29]  SCr 7.00 [12-24 @ 01:15]  SCr 3.60 [12-23 @ 00:18]  SCr 6.90 [12-22 @ 00:08]    Urinalysis - [12-24-19 @ 01:57]      Color Yellow / Appearance Clear / SG 1.017 / pH 6.5      Gluc 500 mg/dL / Ketone Negative  / Bili Negative / Urobili Negative       Blood Small / Protein 300 mg/dL / Leuk Est Negative / Nitrite Negative      RBC 3 / WBC 4 / Hyaline 1 / Gran  / Sq Epi  / Non Sq Epi 1 / Bacteria Negative      Iron 26, TIBC 205, %sat 13      [06-04-19 @ 09:27]  Ferritin 99      [06-04-19 @ 09:33]  PTH -- (Ca 6.9)      [06-05-19 @ 08:14]   562  HbA1c 8.4      [12-15-19 @ 11:00]  TSH 0.88      [12-19-19 @ 10:33]  Lipid: chol 134, , HDL 56, LDL 52      [12-15-19 @ 10:56]    HBsAb >1000.0      [12-14-19 @ 23:48]  HBsAg Nonreact      [12-14-19 @ 23:48]  HCV 0.15, Nonreact      [12-14-19 @ 23:48]

## 2019-12-26 NOTE — DIETITIAN INITIAL EVALUATION ADULT. - ENERGY INTAKE
Last EN intake documented 12/24 780cc (noted EN held 12/23 2/2 gurgling).  Noted EN currently not running, discussed RN.

## 2019-12-26 NOTE — CONSULT NOTE ADULT - ASSESSMENT
64 M hx of CAD s/p 2009, 2019, HTN, DM, carotid stenosis, ESRD on HD admitted as transfer for status epilepticus in setting of new embolic CVA, found to have new AF and 64 M hx of CAD s/p 2009, 2019, HTN, DM, carotid stenosis, ESRD on HD admitted as transfer on mechanical ventilation for status epilepticus in setting of new embolic CVA, found to have new AF now s/p extubation.

## 2019-12-26 NOTE — SWALLOW BEDSIDE ASSESSMENT ADULT - SLP PERTINENT HISTORY OF CURRENT PROBLEM
HPI: Patient is a 64 year old male with a history of CAD s/p stents 2009 and 2019, HTN, DM, Carotid stenosis, Hypothyroidism, and ESRD on HD who presented to outside hospital with altered mental status as he was unresponsive, aphasic, and not moving any extremities.  Was treated with tPA and admitted for observation.  Patient noted to have seizure during hospital course and was started on Keppra.  MRI brain was done which showed bilateral supratentorial and infratentorial infarcts.  Patient was undergoing dialysis when he developed A. fib and was intubated for airway protection.  Neurology following and multiple EEGs were done which showed patient in status epilepticus despite multiple antiepilectics.  Patient now transferred to [facility] for further management. Nephrology following re: ESRD management. CKD stage V awaiting transplant.

## 2019-12-26 NOTE — CONSULT NOTE ADULT - PROBLEM SELECTOR RECOMMENDATION 5
Receiving HD via [facility]. HD as per renal. Was planning for renal transplant.  - cont phoslo   - monitor bmp qd

## 2019-12-26 NOTE — PROGRESS NOTE ADULT - SUBJECTIVE AND OBJECTIVE BOX
THE PATIENT WAS SEEN AND EXAMINED BY ME WITH THE HOUSESTAFF AND STROKE TEAM DURING MORNING ROUNDS.   HPI:  Patient is a 64 year old male with a history of CAD s/p stents 2009 and 2019, HTN, DM, Carotid stenosis, Hypothyroidism, and ESRD on HD who presented to outside hospital with altered mental status as he was unresponsive, aphasic, and not moving any extremities. He was treated with tPA and admitted for observation.  Patient noted to have seizure during hospital course and was started on Keppra.  MRI brain was done which showed bilateral supratentorial and infratentorial infarcts.  Patient was undergoing dialysis when he developed A. fib and was intubated for airway protection.  Neurology following and multiple EEGs were done which showed patient in status epilepticus despite multiple antiepilectics.  Patient was transferred to [facility] for further management. Extubated on 12/21 and transferred to stroke unit.    SUBJECTIVE: No events overnight.  No new neurologic complaints.      acetaminophen    Suspension .. 650 milliGRAM(s) Oral every 6 hours PRN  albuterol/ipratropium for Nebulization 3 milliLiter(s) Nebulizer every 6 hours  aspirin  chewable 81 milliGRAM(s) Oral daily  atorvastatin 80 milliGRAM(s) Oral at bedtime  calcium acetate 667 milliGRAM(s) Oral three times a day with meals  carvedilol 3.125 milliGRAM(s) Oral every 12 hours  chlorhexidine 4% Liquid 1 Application(s) Topical <User Schedule>  clopidogrel Tablet 75 milliGRAM(s) Oral <User Schedule>  heparin  Injectable 5000 Unit(s) SubCutaneous every 12 hours  insulin lispro (HumaLOG) corrective regimen sliding scale   SubCutaneous every 6 hours  insulin NPH human recombinant 18 Unit(s) SubCutaneous every 6 hours  lacosamide IVPB 50 milliGRAM(s) IV Intermittent every 12 hours  levETIRAcetam  IVPB 250 milliGRAM(s) IV Intermittent every 12 hours  levETIRAcetam  Solution 125 milliGRAM(s) Oral daily  levothyroxine 50 MICROGram(s) Oral daily  polyethylene glycol 3350 17 Gram(s) Oral daily  senna 2 Tablet(s) Oral at bedtime  sodium chloride 0.9%. 1000 milliLiter(s) IV Continuous <Continuous>      PHYSICAL EXAM:   Vital Signs Last 24 Hrs  T(C): 37.1 (26 Dec 2019 04:00), Max: 37.8 (25 Dec 2019 15:30)  T(F): 98.8 (26 Dec 2019 04:00), Max: 100 (25 Dec 2019 15:30)  HR: 93 (26 Dec 2019 06:00) (78 - 95)  BP: 137/72 (26 Dec 2019 06:00) (104/55 - 159/71)  BP(mean): 92 (26 Dec 2019 06:00) (68 - 102)  RR: 16 (26 Dec 2019 06:00) (15 - 22)  SpO2: 100% (26 Dec 2019 06:00) (95% - 100%)    General: No acute distress  HEENT: EOM intact, visual fields full  Abdomen: Soft, nontender, nondistended   Extremities: No edema    NEUROLOGICAL EXAM:  Mental status: Awake, alert, Eyes open attentive following commands.   Cranial Nerves: No facial asymmetry, no nystagmus, no dysarthria,  tongue midline  Motor exam: no movement in UE, minimal LE.   Sensation: Intact to light touch   Coordination/ Gait: No dysmetria, gait not tested.  LABS:                        7.0    16.08 )-----------( 532      ( 26 Dec 2019 05:40 )             22.5    12-26    138  |  96  |  95<H>  ----------------------------<  182<H>  5.0   |  15<L>  |  7.60<H>    Ca    8.4      26 Dec 2019 05:40  Phos  5.1     12-25  Mg     2.2     12-25    TPro  7.0  /  Alb  2.7<L>  /  TBili  0.3  /  DBili  <0.1  /  AST  21  /  ALT  16  /  AlkPhos  59  12-24    Hemoglobin A1C, Whole Blood: 8.4 % (12-15 @ 11:00)  Hemoglobin A1C, Whole Blood: 8.2 % (12-14 @ 14:04)  Hemoglobin A1C, Whole Blood: 8.2 % (12-14 @ 12:05)    IMAGING: Reviewed by me.     CT Head No Cont (12.24.19)  Multifocal hypoattenuation involving the bilateral frontal and parietal and right occipital lobes is consistent with previously seen acute/subacute infarcts. No CT evidence for hemorrhagic transformation.  No displaced calvarial fracture or appreciable scalp hematoma.    MRI Head No Cont (12.15.19)  Numerous acute diffuse bilateral supratentorial and   infratentorial infarcts.    CT Angio Head/Neck w/ IV Cont (12.14.19)  Mild stenosis of the proximal left internal carotid artery of   up to 30% based on NASCET criteria. No stenosis of the cervical right   carotid artery based on NASCET criteria. Patent cervical vertebral   arteries.  No significant stenosis or occlusion of the major proximal   arterial branches.

## 2019-12-26 NOTE — CONSULT NOTE ADULT - SUBJECTIVE AND OBJECTIVE BOX
Medicine Consult Note:     CONTACT INFO:  Vamshi Escamilla MD  Internal Medicine PGY3  Pager: [phone] [place]/ [id number] [facility]  Patient is a 64y old  Male who presents with a chief complaint of Seizures (26 Dec 2019 11:32)    HPI:  Patient is a 64 year old male with a history of CAD s/p stents 2009 and 2019, HTN, DM, Carotid stenosis, Hypothyroidism, and ESRD on HD who presented to outside hospital with altered mental status as he was unresponsive, aphasic, and not moving any extremities. Pt received TPA and was admitted to [facility] CCU initially read as SAH on CT and found to be multiple infarcts on MRI. Course complicated by seizure, started on keppra, and also with worsening mental status and subsequently intubated for airway protection. Course further complicated by status epileptucalso with episode transfer to NSICU for further evaluation for status epilepticus during HD and subsequently transferred to NS for further eval.   Pt was admitted to NSICU on vimpat, keppra and phenobarb.     SUBJECTIVE / OVERNIGHT EVENTS:  No acute events overnight. Patient seen and evaluated at bedside. No fever/chills.  Denies SOB at rest, chest pain, palpitations, abdominal pain, nausea/vomiting    ADDITIONAL REVIEW OF SYSTEMS:    MEDICATIONS  (STANDING):  acetaminophen  IVPB .. 1000 milliGRAM(s) IV Intermittent once  albuterol/ipratropium for Nebulization 3 milliLiter(s) Nebulizer every 6 hours  aspirin  chewable 81 milliGRAM(s) Oral daily  atorvastatin 80 milliGRAM(s) Oral at bedtime  calcium acetate 667 milliGRAM(s) Oral three times a day with meals  carvedilol 3.125 milliGRAM(s) Oral every 12 hours  cefepime   IVPB 500 milliGRAM(s) IV Intermittent daily  chlorhexidine 4% Liquid 1 Application(s) Topical <User Schedule>  clopidogrel Tablet 75 milliGRAM(s) Oral <User Schedule>  heparin  Injectable 5000 Unit(s) SubCutaneous every 12 hours  insulin lispro (HumaLOG) corrective regimen sliding scale   SubCutaneous every 6 hours  insulin NPH human recombinant 18 Unit(s) SubCutaneous every 6 hours  lacosamide IVPB 50 milliGRAM(s) IV Intermittent every 12 hours  levETIRAcetam  IVPB 250 milliGRAM(s) IV Intermittent every 12 hours  levETIRAcetam  Solution 125 milliGRAM(s) Oral daily  levothyroxine 50 MICROGram(s) Oral daily  polyethylene glycol 3350 17 Gram(s) Oral daily  senna 2 Tablet(s) Oral at bedtime  sodium chloride 0.9%. 1000 milliLiter(s) (45 mL/Hr) IV Continuous <Continuous>    MEDICATIONS  (PRN):  acetaminophen    Suspension .. 650 milliGRAM(s) Oral every 6 hours PRN Temp greater or equal to 38.5C (101.3F)      CAPILLARY BLOOD GLUCOSE      POCT Blood Glucose.: 201 mg/dL (26 Dec 2019 05:35)  POCT Blood Glucose.: 133 mg/dL (25 Dec 2019 23:08)  POCT Blood Glucose.: 184 mg/dL (25 Dec 2019 17:45)  POCT Blood Glucose.: 148 mg/dL (25 Dec 2019 13:19)    I&O's Summary    25 Dec 2019 07:01  -  26 Dec 2019 07:00  --------------------------------------------------------  IN: 1190 mL / OUT: 300 mL / NET: 890 mL        PHYSICAL EXAM:  Vital Signs Last 24 Hrs  T(C): 36.6 (26 Dec 2019 08:30), Max: 37.8 (25 Dec 2019 15:30)  T(F): 97.8 (26 Dec 2019 08:30), Max: 100 (25 Dec 2019 15:30)  HR: 85 (26 Dec 2019 09:10) (78 - 94)  BP: 119/63 (26 Dec 2019 09:10) (104/55 - 148/65)  BP(mean): 82 (26 Dec 2019 08:00) (68 - 94)  RR: 16 (26 Dec 2019 09:10) (13 - 22)  SpO2: 100% (26 Dec 2019 09:10) (96% - 100%)    CONSTITUTIONAL: NAD, well-developed  RESPIRATORY: Normal respiratory effort; lungs are clear to auscultation bilaterally  CARDIOVASCULAR: Regular rate and rhythm, normal S1 and S2, no murmur/rub/gallop; No lower extremity edema; Peripheral pulses are 2+ bilaterally  ABDOMEN: Nontender to palpation, normoactive bowel sounds, no rebound/guarding; No hepatosplenomegaly  MUSCLOSKELETAL: no clubbing or cyanosis of digits; no joint swelling or tenderness to palpation  PSYCH: A+O to person, place, and time; affect appropriate    LABS:                        7.1    14.57 )-----------( 522      ( 26 Dec 2019 09:43 )             22.3     12-26    138  |  96  |  95<H>  ----------------------------<  182<H>  5.0   |  15<L>  |  7.60<H>    Ca    8.4      26 Dec 2019 05:40  Phos  5.1     12-25  Mg     2.2     12-25                Culture - Sputum (collected 24 Dec 2019 06:07)  Source: .Sputum Sputum  Gram Stain (24 Dec 2019 13:12):    Numerous polymorphonuclear leukocytes per low power field    Few Squamous epithelial cells per low power field    Numerous Gram positive cocci in pairs, chains and clusters per oil power    field    Numerous Gram Negative Rods per oil power field  Preliminary Report (25 Dec 2019 11:43):    Numerous Staphylococcus aureus    Moderate Enterobacter aerogenes    Normal Respiratory Rachel present  Organism: Staphylococcus aureus (26 Dec 2019 11:26)  Organism: Staphylococcus aureus (26 Dec 2019 11:26)    Culture - Blood (collected 24 Dec 2019 03:56)  Source: .Blood Blood  Preliminary Report (25 Dec 2019 04:01):    No growth to date.    Culture - Blood (collected 24 Dec 2019 03:56)  Source: .Blood Blood  Preliminary Report (25 Dec 2019 04:01):    No growth to date.        RADIOLOGY & ADDITIONAL TESTS:  Results Reviewed:   Imaging Personally Reviewed:  Electrocardiogram Personally Reviewed:    COORDINATION OF CARE:  Care Discussed with Consultants/Other Providers [Y/N]:  Prior or Outpatient Records Reviewed [Y/N]: Medicine Consult Note:     CONTACT INFO:  Vamshi Escamilla MD  Internal Medicine PGY3  Pager: [phone] [place]/ [address] [facility]  Patient is a 64y old  Male who presents with a chief complaint of Seizures (26 Dec 2019 11:32)    HPI:  Patient is a 64 year old male with a history of CAD s/p stents 2009 and 2019, HTN, DM, Carotid stenosis, Hypothyroidism, and ESRD on HD who presented to outside hospital with altered mental status as he was unresponsive, aphasic, and not moving any extremities. Pt received TPA and was admitted to [facility] CCU initially read as SAH on CT and found to be multiple infarcts on MRI. Course complicated by seizure, started on keppra, and also with worsening mental status and subsequently intubated for airway protection. Course further complicated by status epilepticus and found to have new AF. Pt transferred to NSICU for further evaluation for status epilepticus with continuous EEG monitoring.   Pt was admitted on mechanical ventilation to NSICU on vimpat, keppra, and phenobarb for seizures and on ASA/plavix for CAD. Cards was consulted for new paroxysmal AF likely as source of CVA, and A/c held as per Neurology.  Pt was extubated on 12/21. Carotid US showed R ICA 70-99% stenosis and L ICA 50-69% stenosis. Neurosurgery was consulted, pending recs. Course was complicated by fever on 12/24 and CT Ch/A/P showed no evidence of infection. Now patient with worsening anemia in setting of ESRD.   SUBJECTIVE / OVERNIGHT EVENTS:  No acute events overnight. Patient seen and evaluated at bedside. No fever. No reported evidence of bleeding in stools or hemoptysis.   ADDITIONAL REVIEW OF SYSTEMS:    MEDICATIONS  (STANDING):  acetaminophen  IVPB .. 1000 milliGRAM(s) IV Intermittent once  albuterol/ipratropium for Nebulization 3 milliLiter(s) Nebulizer every 6 hours  aspirin  chewable 81 milliGRAM(s) Oral daily  atorvastatin 80 milliGRAM(s) Oral at bedtime  calcium acetate 667 milliGRAM(s) Oral three times a day with meals  carvedilol 3.125 milliGRAM(s) Oral every 12 hours  cefepime   IVPB 500 milliGRAM(s) IV Intermittent daily  chlorhexidine 4% Liquid 1 Application(s) Topical <User Schedule>  clopidogrel Tablet 75 milliGRAM(s) Oral <User Schedule>  heparin  Injectable 5000 Unit(s) SubCutaneous every 12 hours  insulin lispro (HumaLOG) corrective regimen sliding scale   SubCutaneous every 6 hours  insulin NPH human recombinant 18 Unit(s) SubCutaneous every 6 hours  lacosamide IVPB 50 milliGRAM(s) IV Intermittent every 12 hours  levETIRAcetam  IVPB 250 milliGRAM(s) IV Intermittent every 12 hours  levETIRAcetam  Solution 125 milliGRAM(s) Oral daily  levothyroxine 50 MICROGram(s) Oral daily  polyethylene glycol 3350 17 Gram(s) Oral daily  senna 2 Tablet(s) Oral at bedtime  sodium chloride 0.9%. 1000 milliLiter(s) (45 mL/Hr) IV Continuous <Continuous>    MEDICATIONS  (PRN):  acetaminophen    Suspension .. 650 milliGRAM(s) Oral every 6 hours PRN Temp greater or equal to 38.5C (101.3F)      CAPILLARY BLOOD GLUCOSE      POCT Blood Glucose.: 201 mg/dL (26 Dec 2019 05:35)  POCT Blood Glucose.: 133 mg/dL (25 Dec 2019 23:08)  POCT Blood Glucose.: 184 mg/dL (25 Dec 2019 17:45)  POCT Blood Glucose.: 148 mg/dL (25 Dec 2019 13:19)    I&O's Summary    25 Dec 2019 07:01  -  26 Dec 2019 07:00  --------------------------------------------------------  IN: 1190 mL / OUT: 300 mL / NET: 890 mL        PHYSICAL EXAM:  Vital Signs Last 24 Hrs  T(C): 36.6 (26 Dec 2019 08:30), Max: 37.8 (25 Dec 2019 15:30)  T(F): 97.8 (26 Dec 2019 08:30), Max: 100 (25 Dec 2019 15:30)  HR: 85 (26 Dec 2019 09:10) (78 - 94)  BP: 119/63 (26 Dec 2019 09:10) (104/55 - 148/65)  BP(mean): 82 (26 Dec 2019 08:00) (68 - 94)  RR: 16 (26 Dec 2019 09:10) (13 - 22)  SpO2: 100% (26 Dec 2019 09:10) (96% - 100%)    CONSTITUTIONAL: NAD, well-developed  RESPIRATORY: Normal respiratory effort; lungs are clear to auscultation bilaterally  CARDIOVASCULAR: Regular rate and rhythm, normal S1 and S2, no murmur/rub/gallop; No lower extremity edema; Peripheral pulses are 2+ bilaterally  ABDOMEN: Nontender to palpation, normoactive bowel sounds, no rebound/guarding; No hepatosplenomegaly  MUSCLOSKELETAL: no clubbing or cyanosis of digits; no joint swelling or tenderness to palpation  PSYCH: A+O to person, place, and time; affect appropriate    LABS:                        7.1    14.57 )-----------( 522      ( 26 Dec 2019 09:43 )             22.3     12-26    138  |  96  |  95<H>  ----------------------------<  182<H>  5.0   |  15<L>  |  7.60<H>    Ca    8.4      26 Dec 2019 05:40  Phos  5.1     12-25  Mg     2.2     12-25                Culture - Sputum (collected 24 Dec 2019 06:07)  Source: .Sputum Sputum  Gram Stain (24 Dec 2019 13:12):    Numerous polymorphonuclear leukocytes per low power field    Few Squamous epithelial cells per low power field    Numerous Gram positive cocci in pairs, chains and clusters per oil power    field    Numerous Gram Negative Rods per oil power field  Preliminary Report (25 Dec 2019 11:43):    Numerous Staphylococcus aureus    Moderate Enterobacter aerogenes    Normal Respiratory Rachel present  Organism: Staphylococcus aureus (26 Dec 2019 11:26)  Organism: Staphylococcus aureus (26 Dec 2019 11:26)    Culture - Blood (collected 24 Dec 2019 03:56)  Source: .Blood Blood  Preliminary Report (25 Dec 2019 04:01):    No growth to date.    Culture - Blood (collected 24 Dec 2019 03:56)  Source: .Blood Blood  Preliminary Report (25 Dec 2019 04:01):    No growth to date.        RADIOLOGY & ADDITIONAL TESTS:  < from: CT Abdomen and Pelvis w/ IV Cont (12.24.19 @ 14:37) >  CHEST:     LUNGS AND LARGE AIRWAYS: Patent central airways. Biapical atelectasis and/or scarring. Bibasilar atelectasis. Nonspecific right upper lobe pulmonary nodules measuring up to 3 mm (4:30).  PLEURA: No pleural effusion.  VESSELS: Right-sided central venous catheter with tipin the cavoatrial junction. Coronary artery and aortic calcifications.  HEART: Heart size is normal. No pericardial effusion.  MEDIASTINUM AND AUSTIN: Subcentimeter mediastinal nodes.  CHEST WALL AND LOWER NECK: Within normal limits.    ABDOMEN AND PELVIS:    LIVER: Within normal limits.  BILE DUCTS: Normal caliber.  GALLBLADDER: Within normal limits.  SPLEEN: Within normal limits.  PANCREAS: Within normal limits.  ADRENALS: Within normal limits.  KIDNEYS/URETERS: Subcentimeter hypoattenuating renal lesions are too small to characterize. No hydronephrosis.    BLADDER: Within normal limits.  REPRODUCTIVE ORGANS: Enlarged prostate.    BOWEL: Enteric catheter with tip in the stomach. No bowel obstruction. Appendix not definitely visualized; no pericecal inflammatory changes.  PERITONEUM: No ascites.  VESSELS: Atherosclerotic disease of the abdominal aorta.  RETROPERITONEUM/LYMPH NODES: No lymphadenopathy.    ABDOMINAL WALL: Within normal limits.  BONES: Mild degenerative changes of the spine.    IMPRESSION:     No drainable fluid collection in the chest, abdomen, and pelvis.    No bowel obstruction.    < from: VA Duplex Carotid, Bilat (12.24.19 @ 16:20) >    RIGHT:    PROX CCA = 115 ;  DIST CCA =110 ;  PROX ICA = 248 ;  DIST ICA = 106 ;  ECA = 190    LEFT   :    PROX CCA = 106 ;  DIST CCA = 122 ;  PROX ICA = 160 ;  DIST ICA = 122 ;  ECA = 194    Impression: Mixed plaque in both internal carotid arteries creating bilateral hemodynamically significant stenoses. The degree of luminal narrowing is estimated at 70-99% by diameter in the right internal carotid artery and 50-69% by diameter in the left internal carotid artery, likely at the lower end of this range for both vessels.    Bilateralmild external carotid artery stenoses.      < from: MR Head No Cont (12.15.19 @ 15:09) >  IMPRESSION: Numerous acute diffuse bilateral supratentorial and   infratentorial infarcts.          Results Reviewed: by me  Imaging Personally Reviewed: by me  Electrocardiogram Personally Reviewed: by me    COORDINATION OF CARE:  Care Discussed with Consultants/Other Providers [Y/N]:   Prior or Outpatient Records Reviewed [Y/N]: Medicine Consult Note:     CONTACT INFO:  Vamshi Escamilla MD  Internal Medicine PGY3  Pager: [phone] [place]/ [address] [facility]  Patient is a 64y old  Male who presents with a chief complaint of Seizures (26 Dec 2019 11:32)    HPI:  Patient is a 64 year old male with a history of CAD s/p stents 2009 and 2019, HTN, DM, Carotid stenosis, Hypothyroidism, and ESRD on HD who presented to outside hospital with altered mental status as he was unresponsive, aphasic, and not moving any extremities. Pt received TPA and was admitted to [facility] CCU initially read as SAH on CT and found to be multiple infarcts on MRI. Course complicated by seizure, started on keppra, and also with worsening mental status and subsequently intubated for airway protection. Course further complicated by status epilepticus and found to have new AF. Pt transferred to NSICU for further evaluation for status epilepticus with continuous EEG monitoring.   Pt was admitted on mechanical ventilation to NSICU on vimpat, keppra, and phenobarb for seizures and on ASA/plavix for CAD. Cards was consulted for new paroxysmal AF likely as source of CVA, and A/c held as per Neurology.  Pt was extubated on 12/21. Carotid US showed R ICA 70-99% stenosis and L ICA 50-69% stenosis. Neurosurgery was consulted, pending recs. Course was complicated by fever on 12/24 and CT Ch/A/P showed no evidence of infection. Now patient with worsening anemia in setting of ESRD.   SUBJECTIVE / OVERNIGHT EVENTS:  No acute events overnight. Patient seen and evaluated at bedside. No fever. No reported evidence of bleeding in stools or hemoptysis.     ADDITIONAL REVIEW OF SYSTEMS:    MEDICATIONS  (STANDING):  acetaminophen  IVPB .. 1000 milliGRAM(s) IV Intermittent once  albuterol/ipratropium for Nebulization 3 milliLiter(s) Nebulizer every 6 hours  aspirin  chewable 81 milliGRAM(s) Oral daily  atorvastatin 80 milliGRAM(s) Oral at bedtime  calcium acetate 667 milliGRAM(s) Oral three times a day with meals  carvedilol 3.125 milliGRAM(s) Oral every 12 hours  cefepime   IVPB 500 milliGRAM(s) IV Intermittent daily  chlorhexidine 4% Liquid 1 Application(s) Topical <User Schedule>  clopidogrel Tablet 75 milliGRAM(s) Oral <User Schedule>  heparin  Injectable 5000 Unit(s) SubCutaneous every 12 hours  insulin lispro (HumaLOG) corrective regimen sliding scale   SubCutaneous every 6 hours  insulin NPH human recombinant 18 Unit(s) SubCutaneous every 6 hours  lacosamide IVPB 50 milliGRAM(s) IV Intermittent every 12 hours  levETIRAcetam  IVPB 250 milliGRAM(s) IV Intermittent every 12 hours  levETIRAcetam  Solution 125 milliGRAM(s) Oral daily  levothyroxine 50 MICROGram(s) Oral daily  polyethylene glycol 3350 17 Gram(s) Oral daily  senna 2 Tablet(s) Oral at bedtime  sodium chloride 0.9%. 1000 milliLiter(s) (45 mL/Hr) IV Continuous <Continuous>    MEDICATIONS  (PRN):  acetaminophen    Suspension .. 650 milliGRAM(s) Oral every 6 hours PRN Temp greater or equal to 38.5C (101.3F)      CAPILLARY BLOOD GLUCOSE      POCT Blood Glucose.: 201 mg/dL (26 Dec 2019 05:35)  POCT Blood Glucose.: 133 mg/dL (25 Dec 2019 23:08)  POCT Blood Glucose.: 184 mg/dL (25 Dec 2019 17:45)  POCT Blood Glucose.: 148 mg/dL (25 Dec 2019 13:19)    I&O's Summary    25 Dec 2019 07:01  -  26 Dec 2019 07:00  --------------------------------------------------------  IN: 1190 mL / OUT: 300 mL / NET: 890 mL        PHYSICAL EXAM:  Vital Signs Last 24 Hrs  T(C): 36.6 (26 Dec 2019 08:30), Max: 37.8 (25 Dec 2019 15:30)  T(F): 97.8 (26 Dec 2019 08:30), Max: 100 (25 Dec 2019 15:30)  HR: 85 (26 Dec 2019 09:10) (78 - 94)  BP: 119/63 (26 Dec 2019 09:10) (104/55 - 148/65)  BP(mean): 82 (26 Dec 2019 08:00) (68 - 94)  RR: 16 (26 Dec 2019 09:10) (13 - 22)  SpO2: 100% (26 Dec 2019 09:10) (96% - 100%)    CONSTITUTIONAL: NAD, well-developed  RESPIRATORY: Normal respiratory effort; lungs are clear to auscultation bilaterally  CARDIOVASCULAR: Regular rate and rhythm, normal S1 and S2, no murmur/rub/gallop; No lower extremity edema; Peripheral pulses are 2+ bilaterally  ABDOMEN: Nontender to palpation, normoactive bowel sounds, no rebound/guarding; No hepatosplenomegaly  MUSCLOSKELETAL: no clubbing or cyanosis of digits; no joint swelling or tenderness to palpation  PSYCH: A+O to person, place, and time; affect appropriate    LABS:                        7.1    14.57 )-----------( 522      ( 26 Dec 2019 09:43 )             22.3     12-26    138  |  96  |  95<H>  ----------------------------<  182<H>  5.0   |  15<L>  |  7.60<H>    Ca    8.4      26 Dec 2019 05:40  Phos  5.1     12-25  Mg     2.2     12-25                Culture - Sputum (collected 24 Dec 2019 06:07)  Source: .Sputum Sputum  Gram Stain (24 Dec 2019 13:12):    Numerous polymorphonuclear leukocytes per low power field    Few Squamous epithelial cells per low power field    Numerous Gram positive cocci in pairs, chains and clusters per oil power    field    Numerous Gram Negative Rods per oil power field  Preliminary Report (25 Dec 2019 11:43):    Numerous Staphylococcus aureus    Moderate Enterobacter aerogenes    Normal Respiratory Rachel present  Organism: Staphylococcus aureus (26 Dec 2019 11:26)  Organism: Staphylococcus aureus (26 Dec 2019 11:26)    Culture - Blood (collected 24 Dec 2019 03:56)  Source: .Blood Blood  Preliminary Report (25 Dec 2019 04:01):    No growth to date.    Culture - Blood (collected 24 Dec 2019 03:56)  Source: .Blood Blood  Preliminary Report (25 Dec 2019 04:01):    No growth to date.        RADIOLOGY & ADDITIONAL TESTS:  < from: CT Abdomen and Pelvis w/ IV Cont (12.24.19 @ 14:37) >  CHEST:     LUNGS AND LARGE AIRWAYS: Patent central airways. Biapical atelectasis and/or scarring. Bibasilar atelectasis. Nonspecific right upper lobe pulmonary nodules measuring up to 3 mm (4:30).  PLEURA: No pleural effusion.  VESSELS: Right-sided central venous catheter with tipin the cavoatrial junction. Coronary artery and aortic calcifications.  HEART: Heart size is normal. No pericardial effusion.  MEDIASTINUM AND AUSTIN: Subcentimeter mediastinal nodes.  CHEST WALL AND LOWER NECK: Within normal limits.    ABDOMEN AND PELVIS:    LIVER: Within normal limits.  BILE DUCTS: Normal caliber.  GALLBLADDER: Within normal limits.  SPLEEN: Within normal limits.  PANCREAS: Within normal limits.  ADRENALS: Within normal limits.  KIDNEYS/URETERS: Subcentimeter hypoattenuating renal lesions are too small to characterize. No hydronephrosis.    BLADDER: Within normal limits.  REPRODUCTIVE ORGANS: Enlarged prostate.    BOWEL: Enteric catheter with tip in the stomach. No bowel obstruction. Appendix not definitely visualized; no pericecal inflammatory changes.  PERITONEUM: No ascites.  VESSELS: Atherosclerotic disease of the abdominal aorta.  RETROPERITONEUM/LYMPH NODES: No lymphadenopathy.    ABDOMINAL WALL: Within normal limits.  BONES: Mild degenerative changes of the spine.    IMPRESSION:     No drainable fluid collection in the chest, abdomen, and pelvis.    No bowel obstruction.    < from: VA Duplex Carotid, Bilat (12.24.19 @ 16:20) >    RIGHT:    PROX CCA = 115 ;  DIST CCA =110 ;  PROX ICA = 248 ;  DIST ICA = 106 ;  ECA = 190    LEFT   :    PROX CCA = 106 ;  DIST CCA = 122 ;  PROX ICA = 160 ;  DIST ICA = 122 ;  ECA = 194    Impression: Mixed plaque in both internal carotid arteries creating bilateral hemodynamically significant stenoses. The degree of luminal narrowing is estimated at 70-99% by diameter in the right internal carotid artery and 50-69% by diameter in the left internal carotid artery, likely at the lower end of this range for both vessels.    Bilateralmild external carotid artery stenoses.      < from: MR Head No Cont (12.15.19 @ 15:09) >  IMPRESSION: Numerous acute diffuse bilateral supratentorial and   infratentorial infarcts.      EEG IMPRESSION/CLINICAL CORRELATE  Abnormal EEG study.  1. Independent epileptogenic foci in the left occipital and left frontocentral region, high risk for focal onset seizures.  2. Structural abnormality in the left parasagittal region.  3. Moderate nonspecific diffuse or multifocal cerebral dysfunction.   4. No seizure seen.    Results Reviewed: by me  Imaging Personally Reviewed: by me  Electrocardiogram Personally Reviewed: by me    COORDINATION OF CARE:  Care Discussed with Consultants/Other Providers [Y/N]: Y  Prior or Outpatient Records Reviewed [Y/N]: Y Medicine Consult Note:     CONTACT INFO:  Vamshi Escamilla MD  Internal Medicine PGY3  Pager: [phone] [place]/ [address] [facility]  Patient is a 64y old  Male who presents with a chief complaint of Seizures (26 Dec 2019 11:32)    HPI:  Patient is a 64 year old male with a history of CAD s/p stents 2009 and 2019, HTN, DM, Carotid stenosis, Hypothyroidism, and ESRD on HD who presented to outside hospital with altered mental status as he was unresponsive, aphasic, and not moving any extremities. Pt received TPA and was admitted to [facility] CCU initially read as SAH on CT and found to be multiple infarcts on MRI. Course complicated by seizure, started on keppra, and also with worsening mental status and subsequently intubated for airway protection. Course further complicated by status epilepticus and found to have new AF. Pt transferred to NSICU for further evaluation for status epilepticus with continuous EEG monitoring.   Pt was admitted on mechanical ventilation to NSICU on vimpat, keppra, and phenobarb for seizures and on ASA/plavix for CAD. Cards was consulted for new paroxysmal AF likely as source of CVA, and A/c held as per Neurology.  Pt was extubated on 12/21. Carotid US showed R ICA 70-99% stenosis and L ICA 50-69% stenosis. Neurosurgery was consulted, pending recs. Course was complicated by fever on 12/24 and CT Ch/A/P showed no evidence of infection. Now patient with worsening anemia in setting of ESRD.   SUBJECTIVE / OVERNIGHT EVENTS:  No acute events overnight. Patient seen and evaluated at bedside. No fever. No reported evidence of bleeding in stools or hemoptysis. Pt nonverbal.     ADDITIONAL REVIEW OF SYSTEMS: Unable to assess as pt nonverbal.     MEDICATIONS  (STANDING):  acetaminophen  IVPB .. 1000 milliGRAM(s) IV Intermittent once  albuterol/ipratropium for Nebulization 3 milliLiter(s) Nebulizer every 6 hours  aspirin  chewable 81 milliGRAM(s) Oral daily  atorvastatin 80 milliGRAM(s) Oral at bedtime  calcium acetate 667 milliGRAM(s) Oral three times a day with meals  carvedilol 3.125 milliGRAM(s) Oral every 12 hours  cefepime   IVPB 500 milliGRAM(s) IV Intermittent daily  chlorhexidine 4% Liquid 1 Application(s) Topical <User Schedule>  clopidogrel Tablet 75 milliGRAM(s) Oral <User Schedule>  heparin  Injectable 5000 Unit(s) SubCutaneous every 12 hours  insulin lispro (HumaLOG) corrective regimen sliding scale   SubCutaneous every 6 hours  insulin NPH human recombinant 18 Unit(s) SubCutaneous every 6 hours  lacosamide IVPB 50 milliGRAM(s) IV Intermittent every 12 hours  levETIRAcetam  IVPB 250 milliGRAM(s) IV Intermittent every 12 hours  levETIRAcetam  Solution 125 milliGRAM(s) Oral daily  levothyroxine 50 MICROGram(s) Oral daily  polyethylene glycol 3350 17 Gram(s) Oral daily  senna 2 Tablet(s) Oral at bedtime  sodium chloride 0.9%. 1000 milliLiter(s) (45 mL/Hr) IV Continuous <Continuous>    MEDICATIONS  (PRN):  acetaminophen    Suspension .. 650 milliGRAM(s) Oral every 6 hours PRN Temp greater or equal to 38.5C (101.3F)      CAPILLARY BLOOD GLUCOSE      POCT Blood Glucose.: 201 mg/dL (26 Dec 2019 05:35)  POCT Blood Glucose.: 133 mg/dL (25 Dec 2019 23:08)  POCT Blood Glucose.: 184 mg/dL (25 Dec 2019 17:45)  POCT Blood Glucose.: 148 mg/dL (25 Dec 2019 13:19)    I&O's Summary    25 Dec 2019 07:01  -  26 Dec 2019 07:00  --------------------------------------------------------  IN: 1190 mL / OUT: 300 mL / NET: 890 mL        PHYSICAL EXAM:  Vital Signs Last 24 Hrs  T(C): 36.6 (26 Dec 2019 08:30), Max: 37.8 (25 Dec 2019 15:30)  T(F): 97.8 (26 Dec 2019 08:30), Max: 100 (25 Dec 2019 15:30)  HR: 85 (26 Dec 2019 09:10) (78 - 94)  BP: 119/63 (26 Dec 2019 09:10) (104/55 - 148/65)  BP(mean): 82 (26 Dec 2019 08:00) (68 - 94)  RR: 16 (26 Dec 2019 09:10) (13 - 22)  SpO2: 100% (26 Dec 2019 09:10) (96% - 100%)    CONSTITUTIONAL: NAD  RESPIRATORY: Normal respiratory effort; lungs are clear to auscultation bilaterally  CARDIOVASCULAR: Regular rate and rhythm, normal S1 and S2, no murmur/rub/gallop; No lower extremity edema; Peripheral pulses are 1+ bilaterally in LEs  ABDOMEN: Nontender to palpation, normoactive bowel sounds, no rebound/guarding; No hepatosplenomegaly  MUSCLOSKELETAL: no clubbing or cyanosis of digits; no joint swelling or tenderness to palpation  PSYCH: nonverbal, responded to hand squeeze of left hand, does not move lower extremities    LABS:                        7.1    14.57 )-----------( 522      ( 26 Dec 2019 09:43 )             22.3     12-26    138  |  96  |  95<H>  ----------------------------<  182<H>  5.0   |  15<L>  |  7.60<H>    Ca    8.4      26 Dec 2019 05:40  Phos  5.1     12-25  Mg     2.2     12-25              Culture - Sputum (collected 24 Dec 2019 06:07)  Source: .Sputum Sputum  Gram Stain (24 Dec 2019 13:12):    Numerous polymorphonuclear leukocytes per low power field    Few Squamous epithelial cells per low power field    Numerous Gram positive cocci in pairs, chains and clusters per oil power    field    Numerous Gram Negative Rods per oil power field  Preliminary Report (25 Dec 2019 11:43):    Numerous Staphylococcus aureus    Moderate Enterobacter aerogenes    Normal Respiratory Rachel present  Organism: Staphylococcus aureus (26 Dec 2019 11:26)  Organism: Staphylococcus aureus (26 Dec 2019 11:26)    Culture - Blood (collected 24 Dec 2019 03:56)  Source: .Blood Blood  Preliminary Report (25 Dec 2019 04:01):    No growth to date.    Culture - Blood (collected 24 Dec 2019 03:56)  Source: .Blood Blood  Preliminary Report (25 Dec 2019 04:01):    No growth to date.        RADIOLOGY & ADDITIONAL TESTS:  < from: CT Abdomen and Pelvis w/ IV Cont (12.24.19 @ 14:37) >  CHEST:     LUNGS AND LARGE AIRWAYS: Patent central airways. Biapical atelectasis and/or scarring. Bibasilar atelectasis. Nonspecific right upper lobe pulmonary nodules measuring up to 3 mm (4:30).  PLEURA: No pleural effusion.  VESSELS: Right-sided central venous catheter with tipin the cavoatrial junction. Coronary artery and aortic calcifications.  HEART: Heart size is normal. No pericardial effusion.  MEDIASTINUM AND AUSTIN: Subcentimeter mediastinal nodes.  CHEST WALL AND LOWER NECK: Within normal limits.    ABDOMEN AND PELVIS:    LIVER: Within normal limits.  BILE DUCTS: Normal caliber.  GALLBLADDER: Within normal limits.  SPLEEN: Within normal limits.  PANCREAS: Within normal limits.  ADRENALS: Within normal limits.  KIDNEYS/URETERS: Subcentimeter hypoattenuating renal lesions are too small to characterize. No hydronephrosis.    BLADDER: Within normal limits.  REPRODUCTIVE ORGANS: Enlarged prostate.    BOWEL: Enteric catheter with tip in the stomach. No bowel obstruction. Appendix not definitely visualized; no pericecal inflammatory changes.  PERITONEUM: No ascites.  VESSELS: Atherosclerotic disease of the abdominal aorta.  RETROPERITONEUM/LYMPH NODES: No lymphadenopathy.    ABDOMINAL WALL: Within normal limits.  BONES: Mild degenerative changes of the spine.    IMPRESSION:     No drainable fluid collection in the chest, abdomen, and pelvis.    No bowel obstruction.    < from: VA Duplex Carotid, Bilat (12.24.19 @ 16:20) >    RIGHT:    PROX CCA = 115 ;  DIST CCA =110 ;  PROX ICA = 248 ;  DIST ICA = 106 ;  ECA = 190    LEFT   :    PROX CCA = 106 ;  DIST CCA = 122 ;  PROX ICA = 160 ;  DIST ICA = 122 ;  ECA = 194    Impression: Mixed plaque in both internal carotid arteries creating bilateral hemodynamically significant stenoses. The degree of luminal narrowing is estimated at 70-99% by diameter in the right internal carotid artery and 50-69% by diameter in the left internal carotid artery, likely at the lower end of this range for both vessels.    Bilateralmild external carotid artery stenoses.      < from: MR Head No Cont (12.15.19 @ 15:09) >  IMPRESSION: Numerous acute diffuse bilateral supratentorial and   infratentorial infarcts.      EEG IMPRESSION/CLINICAL CORRELATE  Abnormal EEG study.  1. Independent epileptogenic foci in the left occipital and left frontocentral region, high risk for focal onset seizures.  2. Structural abnormality in the left parasagittal region.  3. Moderate nonspecific diffuse or multifocal cerebral dysfunction.   4. No seizure seen.    Results Reviewed: by me  Imaging Personally Reviewed: by me  Electrocardiogram Personally Reviewed: by me    COORDINATION OF CARE:  Care Discussed with Consultants/Other Providers [Y/N]: Y  Prior or Outpatient Records Reviewed [Y/N]: Y

## 2019-12-26 NOTE — PROGRESS NOTE ADULT - SUBJECTIVE AND OBJECTIVE BOX
Patient seen and examined at bedside.    Overnight Events:   No overnight events    REVIEW OF SYSTEMS:  Constitutional:     [ ] negative [ ] fevers [ ] chills [ ] weight loss [ ] weight gain  HEENT:                  [ ] negative [ ] dry eyes [ ] eye irritation [ ] postnasal drip [ ] nasal congestion  CV:                         [ ] negative  [ ] chest pain [ ] orthopnea [ ] palpitations [ ] murmur  Resp:                     [ ] negative [ ] cough [ ] shortness of breath [ ] dyspnea [ ] wheezing [ ] sputum [ ]hemoptysis  GI:                          [ ] negative [ ] nausea [ ] vomiting [ ] diarrhea [ ] constipation [ ] abd pain [ ] dysphagia   :                        [ ] negative [ ] dysuria [ ] nocturia [ ] hematuria [ ] increased urinary frequency  Musculoskeletal: [ ] negative [ ] back pain [ ] myalgias [ ] arthralgias [ ] fracture  Skin:                       [ ] negative [ ] rash [ ] itch  Neurological:        [ ] negative [ ] headache [ ] dizziness [ ] syncope [ ] weakness [ ] numbness  Psychiatric:           [ ] negative [ ] anxiety [ ] depression  Endocrine:            [ ] negative [ ] diabetes [ ] thyroid problem  Heme/Lymph:      [ ] negative [ ] anemia [ ] bleeding problem  Allergic/Immune: [ ] negative [ ] itchy eyes [ ] nasal discharge [ ] hives [ ] angioedema    [ ] All other systems negative  [x] Unable to assess ROS due to AMS    Current Meds:  acetaminophen    Suspension .. 650 milliGRAM(s) Oral every 6 hours PRN  albuterol/ipratropium for Nebulization 3 milliLiter(s) Nebulizer every 6 hours  aspirin  chewable 81 milliGRAM(s) Oral daily  atorvastatin 80 milliGRAM(s) Oral at bedtime  calcium acetate 667 milliGRAM(s) Oral three times a day with meals  carvedilol 3.125 milliGRAM(s) Oral every 12 hours  chlorhexidine 4% Liquid 1 Application(s) Topical <User Schedule>  clopidogrel Tablet 75 milliGRAM(s) Oral <User Schedule>  heparin  Injectable 5000 Unit(s) SubCutaneous every 12 hours  insulin lispro (HumaLOG) corrective regimen sliding scale   SubCutaneous every 6 hours  insulin NPH human recombinant 18 Unit(s) SubCutaneous every 6 hours  lacosamide IVPB 50 milliGRAM(s) IV Intermittent every 12 hours  levETIRAcetam  IVPB 250 milliGRAM(s) IV Intermittent every 12 hours  levETIRAcetam  Solution 125 milliGRAM(s) Oral daily  levothyroxine 50 MICROGram(s) Oral daily  polyethylene glycol 3350 17 Gram(s) Oral daily  senna 2 Tablet(s) Oral at bedtime  sodium chloride 0.9%. 1000 milliLiter(s) IV Continuous <Continuous>      PAST MEDICAL & SURGICAL HISTORY:  Hypothyroidism  CRI (Chronic Renal Insufficiency)  CAD (Coronary Artery Disease): with Stents in 06/2009 and 6/2019  Dyslipidemia  Diabetes  Hypertension  History of insertion of stent into coronary artery bypass graft: 2009 and 2019      Vitals:  T(F): 97.7 (12-26), Max: 98.8 (12-26)  HR: 86 (12-26) (78 - 93)  BP: 118/64 (12-26) (109/60 - 137/72)  RR: 20 (12-26)  SpO2: 99% (12-26)  I&O's Summary    25 Dec 2019 07:01  -  26 Dec 2019 07:00  --------------------------------------------------------  IN: 1190 mL / OUT: 300 mL / NET: 890 mL    26 Dec 2019 07:01  -  26 Dec 2019 18:14  --------------------------------------------------------  IN: 540 mL / OUT: 0 mL / NET: 540 mL        Physical Exam:  Appearance: No acute distress; well appearing  Eyes: PERRL, EOMI, pink conjunctiva  HENT: Normal oral mucosa  Cardiovascular: RRR, S1, S2, no murmurs, rubs, or gallops; no edema; no JVD  Respiratory: Clear to auscultation bilaterally  Gastrointestinal: soft, non-tender, non-distended with normal bowel sounds  Musculoskeletal: No clubbing; no joint deformity   Neurologic: Non-focal  Lymphatic: No lymphadenopathy  Psychiatry: AAOx3, mood & affect appropriate  Skin: No rashes, ecchymoses, or cyanosis                          7.1    14.57 )-----------( 522      ( 26 Dec 2019 09:43 )             22.3     12-26    138  |  96  |  95<H>  ----------------------------<  182<H>  5.0   |  15<L>  |  7.60<H>    Ca    8.4      26 Dec 2019 05:40  Phos  5.1     12-25  Mg     2.2     12-25                    New ECG(s): Personally reviewed    Echo:    Stress Testing:     Cath:    Imaging:    Interpretation of Telemetry:  Sinus rhythm

## 2019-12-26 NOTE — CONSULT NOTE ADULT - SUBJECTIVE AND OBJECTIVE BOX
PULMONARY CONSULT    HPI: 65 y/o M with PMH of CAD s/p stents 2009 and 2019, HTN, DM, Carotid stenosis, Hypothyroidism, and ESRD on HD who presented to outside hospital with AMS. Found to have new onset Afib and bilateral extensive bihemispheric infarcts. s/p tPA at outside hospital. Hospital course c/b status epilepticus. Intubated for airway protection, extubated 12/21. Called to eval 12/26 for secretions, leukocytosis and concern for PNA. Patient endorses dyspnea with white-yellow phlegm. Oriented x2, lethargic. 98% on RA.       PAST MEDICAL & SURGICAL HISTORY:  Hypothyroidism  CRI (Chronic Renal Insufficiency)  CAD (Coronary Artery Disease): with Stents in 06/2009 and 6/2019  Dyslipidemia  Diabetes  Hypertension  History of insertion of stent into coronary artery bypass graft: 2009 and 2019    Allergies    No Known Allergies    Intolerances      FAMILY HISTORY: Non-contributory     Social history: Never smoker     Review of Systems:  CONSTITUTIONAL: No fever, chills, or fatigue  EYES: No eye pain, visual disturbances, or discharge  ENMT:  No difficulty hearing, tinnitus, vertigo; No sinus or throat pain  NECK: No pain or stiffness  RESPIRATORY: Per above  CARDIOVASCULAR: No chest pain, palpitations, dizziness, or leg swelling  GASTROINTESTINAL: No abdominal or epigastric pain. No nausea, vomiting, or hematemesis; No diarrhea or constipation. No melena or hematochezia.  GENITOURINARY: No dysuria, frequency, hematuria, or incontinence  NEUROLOGICAL: No headaches, memory loss, loss of strength, numbness, or tremors  SKIN: No itching, burning, rashes, or lesions   MUSCULOSKELETAL: No joint pain or swelling; No muscle, back, or extremity pain  PSYCHIATRIC: No depression, anxiety, mood swings, or difficulty sleeping      Medications:  MEDICATIONS  (STANDING):  acetaminophen  IVPB .. 1000 milliGRAM(s) IV Intermittent once  albuterol/ipratropium for Nebulization 3 milliLiter(s) Nebulizer every 6 hours  aspirin  chewable 81 milliGRAM(s) Oral daily  atorvastatin 80 milliGRAM(s) Oral at bedtime  calcium acetate 667 milliGRAM(s) Oral three times a day with meals  carvedilol 3.125 milliGRAM(s) Oral every 12 hours  cefepime   IVPB 500 milliGRAM(s) IV Intermittent daily  chlorhexidine 4% Liquid 1 Application(s) Topical <User Schedule>  clopidogrel Tablet 75 milliGRAM(s) Oral <User Schedule>  heparin  Injectable 5000 Unit(s) SubCutaneous every 12 hours  insulin lispro (HumaLOG) corrective regimen sliding scale   SubCutaneous every 6 hours  insulin NPH human recombinant 18 Unit(s) SubCutaneous every 6 hours  lacosamide IVPB 50 milliGRAM(s) IV Intermittent every 12 hours  levETIRAcetam  IVPB 250 milliGRAM(s) IV Intermittent every 12 hours  levETIRAcetam  Solution 125 milliGRAM(s) Oral daily  levothyroxine 50 MICROGram(s) Oral daily  polyethylene glycol 3350 17 Gram(s) Oral daily  senna 2 Tablet(s) Oral at bedtime  sodium chloride 0.9%. 1000 milliLiter(s) (45 mL/Hr) IV Continuous <Continuous>    MEDICATIONS  (PRN):  acetaminophen    Suspension .. 650 milliGRAM(s) Oral every 6 hours PRN Temp greater or equal to 38.5C (101.3F)            Vital Signs Last 24 Hrs  T(C): 36.6 (26 Dec 2019 08:30), Max: 37.8 (25 Dec 2019 15:30)  T(F): 97.8 (26 Dec 2019 08:30), Max: 100 (25 Dec 2019 15:30)  HR: 85 (26 Dec 2019 09:10) (78 - 94)  BP: 119/63 (26 Dec 2019 09:10) (104/55 - 148/65)  BP(mean): 82 (26 Dec 2019 08:00) (68 - 94)  RR: 16 (26 Dec 2019 09:10) (13 - 22)  SpO2: 100% (26 Dec 2019 09:10) (96% - 100%) on RA            12-25 @ 07:01  -  12-26 @ 07:00  --------------------------------------------------------  IN: 1190 mL / OUT: 300 mL / NET: 890 mL          LABS:                        7.1    14.57 )-----------( 522      ( 26 Dec 2019 09:43 )             22.3     12-26    138  |  96  |  95<H>  ----------------------------<  182<H>  5.0   |  15<L>  |  7.60<H>    Ca    8.4      26 Dec 2019 05:40  Phos  5.1     12-25  Mg     2.2     12-25            CAPILLARY BLOOD GLUCOSE      POCT Blood Glucose.: 201 mg/dL (26 Dec 2019 05:35)                      CULTURES: (if applicable)  Culture Results:   Numerous Staphylococcus aureus  Moderate Enterobacter aerogenes  Normal Respiratory Rachel present (12-24 @ 06:07)  Culture Results:   No growth to date. (12-24 @ 03:56)  Culture Results:   No growth to date. (12-24 @ 03:56)        Physical Examination:    General: No acute distress.      HEENT: Pupils equal, reactive to light.  Symmetric.    PULM: Diminished BS bilaterally, grossly clear     CVS: S1, S2    ABD: Soft, nondistended, nontender, normoactive bowel sounds, no masses    EXT: No edema, nontender    SKIN: Warm and well perfused, no rashes noted.    NEURO: Alert, oriented x2, interactive    RADIOLOGY REVIEWED  CT chest: < from: CT Chest w/ IV Cont (12.24.19 @ 14:37) >  CHEST:     LUNGS AND LARGE AIRWAYS: Patent central airways. Biapical atelectasis and/or scarring. Bibasilar atelectasis. Nonspecific right upper lobe pulmonary nodules measuring up to 3 mm (4:30).  PLEURA: No pleural effusion.  VESSELS: Right-sided central venous catheter with tipin the cavoatrial junction. Coronary artery and aortic calcifications.  HEART: Heart size is normal. No pericardial effusion.  MEDIASTINUM AND AUSTIN: Subcentimeter mediastinal nodes.  CHEST WALL AND LOWER NECK: Within normal limits.    < end of copied text >

## 2019-12-26 NOTE — DIETITIAN INITIAL EVALUATION ADULT. - ENTERAL
Nepro goal rate 50 ml/hr x 24 hours to provide 1200ml formula, 2160 orestes, 97 Gm protein, 872ml free water (provides  32 orestes/Kg and  1.4 Gm protein/Kg based on dosing wt 66.6Kg)

## 2019-12-26 NOTE — SWALLOW BEDSIDE ASSESSMENT ADULT - SWALLOW EVAL: CRITERIA FOR SKILLED INTERVENTION MET
as secretion management improves and mentation improves, this service will reassess swallow mechanism

## 2019-12-26 NOTE — CONSULT NOTE ADULT - ASSESSMENT
63 y/o M with PMH of CAD s/p stents 2009 and 2019, HTN, DM, Carotid stenosis, Hypothyroidism, and ESRD on HD who presented to outside hospital with AMS. Found to have new onset Afib and bilateral extensive bihemispheric infarcts. s/p tPA at outside hospital. Hospital course c/b status epilepticus. Intubated for airway protection, extubated 12/21. Called to eval 12/26 for secretions, leukocytosis.

## 2019-12-26 NOTE — PROGRESS NOTE ADULT - ASSESSMENT
64 M hx of CAD s/p 2009, 2019, HTN, DM, carotid stenosis, ESRD on HD p/w AMS found to have new afib and embolic CVA.    #afib  -paroxsymal, currently NSR  -suspected etiology for embolic CVA  -CHADsVAsc > 4, currently not on AC  -Neuro feels should hold off for 2 wks for AC  c/w carvedilol 3.215mg BID    #CAD  -currently on ASA/Plavix  - c/w atorvastain 80mg daily  -depending on bleeding risk of CVA, will consider single antiplatelet therapy w/ AC in 2 wks  continue DAPT for now    Feeds  Currently NG tube  Planned for PEG tomorrow  Pt had stents placed ~6 months ago  Would continue DAPT through procedure

## 2019-12-26 NOTE — PROGRESS NOTE ADULT - ASSESSMENT
65 yo male with ESRD on HD (M-W-FRI without AVF as awaiting renal transplant expected within 3 months; Dr. Barrientos; [facility]), CAD s/p stent 2009 and CARLA in June 2019, HTN, DM type 2, hypothyroidism and left carotid stenosis admitted for altered mental status during dialysis, he became unresponsive and arm not moving therefore he was sent to hospital. At [facility], he received tPA on 12/13/19. Was noted to have SAH after tPA. Started on 3% saline. Had seizure, despite multiple AEDs remained in status epilepticus and was transferred to [facility] for continuous EEG monitoring. Remains in status epilepticus here despite multiple anti-seizure meds. Nephrology is following for ESRD management.    ESRD on HD MWF  - HD consent in the chart    - last HD 12/24/19; tolerated well, no hypotensive episodes    - will have HD today w/ PRBC transfusion  - please dose Keppra post HD on HD days      Anemia  - Hgb below goal; unable to use epogen due to CVA  - transfuse PRN Hgb <7 or hemodynamically significant bleeding; will get 1U PRBC today  - r/o occult bleeding     HTN  - well controlled  - continue coreg  - Will avoid hypotension w/ HD

## 2019-12-26 NOTE — CONSULT NOTE ADULT - SUBJECTIVE AND OBJECTIVE BOX
Chief Complaint:  Patient is a 64y old  Male who presents with a chief complaint of Seizures (26 Dec 2019 12:19)      HPI: Patient is a 64 year old male with a history of CAD s/p stents  and , HTN, DM, Carotid stenosis, Hypothyroidism, and ESRD on HD who presented to outside hospital with altered mental status as he was unresponsive, aphasic, and not moving any extremities.  Was treated with tPA and admitted for observation.  Patient noted to have seizure during hospital course and was started on Keppra.  MRI brain was done which showed bilateral supratentorial and infratentorial infarcts.  Patient was undergoing dialysis when he developed A. fib and was intubated for airway protection.  Neurology following and multiple EEGs were done which showed patient in status epilepticus despite multiple antiepilectics.  Patient now transferred to [facility] for further management.    GI consulted for dysphagia as he failed bedside s/s eval. He denies abdominal pain, n/v. As per RN tolerating ngt feeds.    Allergies:  No Known Allergies      Medications:  acetaminophen    Suspension .. 650 milliGRAM(s) Oral every 6 hours PRN  acetaminophen  IVPB .. 1000 milliGRAM(s) IV Intermittent once  albuterol/ipratropium for Nebulization 3 milliLiter(s) Nebulizer every 6 hours  aspirin  chewable 81 milliGRAM(s) Oral daily  atorvastatin 80 milliGRAM(s) Oral at bedtime  calcium acetate 667 milliGRAM(s) Oral three times a day with meals  carvedilol 3.125 milliGRAM(s) Oral every 12 hours  cefepime   IVPB 500 milliGRAM(s) IV Intermittent daily  chlorhexidine 4% Liquid 1 Application(s) Topical <User Schedule>  clopidogrel Tablet 75 milliGRAM(s) Oral <User Schedule>  heparin  Injectable 5000 Unit(s) SubCutaneous every 12 hours  insulin lispro (HumaLOG) corrective regimen sliding scale   SubCutaneous every 6 hours  insulin NPH human recombinant 18 Unit(s) SubCutaneous every 6 hours  lacosamide IVPB 50 milliGRAM(s) IV Intermittent every 12 hours  levETIRAcetam  IVPB 250 milliGRAM(s) IV Intermittent every 12 hours  levETIRAcetam  Solution 125 milliGRAM(s) Oral daily  levothyroxine 50 MICROGram(s) Oral daily  polyethylene glycol 3350 17 Gram(s) Oral daily  senna 2 Tablet(s) Oral at bedtime  sodium chloride 0.9%. 1000 milliLiter(s) IV Continuous <Continuous>      PMHX/PSHX:  Hypothyroidism  CRI (Chronic Renal Insufficiency)  CAD (Coronary Artery Disease)  CAD (Coronary Artery Disease)  Dyslipidemia  Diabetes  Hypertension  History of insertion of stent into coronary artery bypass graft  No significant past surgical history      Family history:  FH: HTN (hypertension)  No pertinent family history in first degree relatives  No pertinent family history in first degree relatives  No pertinent family history in first degree relatives      Social History:     ROS:     General:  No wt loss, fevers, chills, night sweats, fatigue,   Eyes:  Good vision, no reported pain  ENT:  No sore throat, pain, runny nose, dysphagia  CV:  No pain, palpitations, hypo/hypertension  Resp:  No dyspnea, cough, tachypnea, wheezing  GI:  No pain, No nausea, No vomiting, No diarrhea, No constipation, No weight loss, No fever, No pruritis, No rectal bleeding, No tarry stools, No dysphagia,  :  No pain, bleeding, incontinence, nocturia  Muscle:  No pain, weakness  Neuro:  No weakness, tingling, memory problems  Psych:  No fatigue, insomnia, mood problems, depression  Endocrine:  No polyuria, polydipsia, cold/heat intolerance  Heme:  No petechiae, ecchymosis, easy bruisability  Skin:  No rash, tattoos, scars, edema      PHYSICAL EXAM:   Vital Signs:  Vital Signs Last 24 Hrs  T(C): 36.6 (26 Dec 2019 08:30), Max: 37.8 (25 Dec 2019 15:30)  T(F): 97.8 (26 Dec 2019 08:30), Max: 100 (25 Dec 2019 15:30)  HR: 85 (26 Dec 2019 09:10) (78 - 93)  BP: 119/63 (26 Dec 2019 09:10) (104/55 - 138/63)  BP(mean): 82 (26 Dec 2019 08:00) (68 - 92)  RR: 16 (26 Dec 2019 09:10) (13 - 20)  SpO2: 100% (26 Dec 2019 09:10) (96% - 100%)  Daily     Daily Weight in k.6 (26 Dec 2019 08:12)    GENERAL:  no distress  HEENT:  NC/AT,  conjunctivae clear and pink, no thyromegaly, nodules, adenopathy, no JVD, sclera -anicteric, + NGT   CHEST:  breathing well on RA   ABDOMEN:  Soft, non-tender, non-distended, normoactive bowel sounds,  no masses ,no hepato- splenomegaly, no signs of chronic liver disease  EXTEREMITIES :  no cyanosis, clubbing or edema  SKIN:  No rash/erythema/ecchymoses/petechiae/wounds/abscess/warm/dry  NEURO:   Awake, alert, Eyes open attentive following commands.                           7.1    14.57 )-----------( 522      ( 26 Dec 2019 09:43 )             22.3     12-    138  |  96  |  95<H>  ----------------------------<  182<H>  5.0   |  15<L>  |  7.60<H>    Ca    8.4      26 Dec 2019 05:40  Phos  5.1     12-25  Mg     2.2     12-                Imaging:

## 2019-12-26 NOTE — SWALLOW BEDSIDE ASSESSMENT ADULT - COMMENTS
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 Cardiology following. Pt found to have new afib and embolic CVA. EEG 12/20: negative for seizures. Pt on pressure support as tolerated, decadron x24hrs for airway edema and required chest PT/frequent suctioning. EEG 12/21: negative for seizures. Pt extubated 12/21.  Most recent head CT 12/24: IMPRESSION: Multifocal hypoattenuation involving the bilateral frontal and parietal and right occipital lobes is consistent with previously seen acute/subacute infarcts. No CT evidence for hemorrhagic transformation. No displaced calvarial fracture or appreciable scalp hematoma.  CT Chest/ABD/Pelvis: IMPRESSION:     No drainable fluid collection in the chest, abdomen, and pelvis. No bowel obstruction.

## 2019-12-26 NOTE — CONSULT NOTE ADULT - PROBLEM SELECTOR RECOMMENDATION 2
- care per neurology appreciated
with NGT  -S+S eval, likely will need PEG
Elevated since arrival, decreased from 24k to to 12k. Was febrile on 12/24 and blood cultures negative to date. UA negative for UTI. CT CH/A/P with no evidence of infection. Sputum culture showed MSSA  - f/u ID recs, cont cefepime as per ID

## 2019-12-26 NOTE — CONSULT NOTE ADULT - SUBJECTIVE AND OBJECTIVE BOX
HPI:   Patient is a 64y male with esrd, cad, was supposed to get renal transplant so no avf just permacath, recent stent to heart who developed afib while on HD with low bp then aphasia about 2 weeks ago. Had tpa, found to have multiple cerebral infarcts in watershed areas. He developed status epilepticus so transferred from [facility] to here a week ago. He required intubation for airway protection , now extubated and out of status. He has grossly purulent secretions and difficulty managing them with staph aureus in the sputum hence we are called. The patient is nonverbal so I cannot get any information from him. He is not with a fever.     REVIEW OF SYSTEMS:  All other review of systems negative (Comprehensive ROS)    PAST MEDICAL & SURGICAL HISTORY:  Hypothyroidism  CRI (Chronic Renal Insufficiency)  CAD (Coronary Artery Disease): with Stents in 06/2009 and 6/2019  Dyslipidemia  Diabetes  Hypertension  History of insertion of stent into coronary artery bypass graft: 2009 and 2019      Allergies    No Known Allergies    Intolerances        Antimicrobials Day #  :    Other Medications:  acetaminophen    Suspension .. 650 milliGRAM(s) Oral every 6 hours PRN  acetaminophen  IVPB .. 1000 milliGRAM(s) IV Intermittent once  albuterol/ipratropium for Nebulization 3 milliLiter(s) Nebulizer every 6 hours  aspirin  chewable 81 milliGRAM(s) Oral daily  atorvastatin 80 milliGRAM(s) Oral at bedtime  calcium acetate 667 milliGRAM(s) Oral three times a day with meals  carvedilol 3.125 milliGRAM(s) Oral every 12 hours  chlorhexidine 4% Liquid 1 Application(s) Topical <User Schedule>  clopidogrel Tablet 75 milliGRAM(s) Oral <User Schedule>  heparin  Injectable 5000 Unit(s) SubCutaneous every 12 hours  insulin lispro (HumaLOG) corrective regimen sliding scale   SubCutaneous every 6 hours  insulin NPH human recombinant 18 Unit(s) SubCutaneous every 6 hours  lacosamide IVPB 50 milliGRAM(s) IV Intermittent every 12 hours  levETIRAcetam  IVPB 250 milliGRAM(s) IV Intermittent every 12 hours  levETIRAcetam  Solution 125 milliGRAM(s) Oral daily  levothyroxine 50 MICROGram(s) Oral daily  polyethylene glycol 3350 17 Gram(s) Oral daily  senna 2 Tablet(s) Oral at bedtime  sodium chloride 0.9%. 1000 milliLiter(s) IV Continuous <Continuous>      FAMILY HISTORY:      SOCIAL HISTORY:  Smoking: [ ]Yes [ x]No  ETOH: [ ]Yes [ x]No  Drug Use: [ ]Yes [x ]No   [ x] Single[ ]    T(F): 97.8 (12-26-19 @ 08:30), Max: 100 (12-25-19 @ 15:30)  HR: 85 (12-26-19 @ 09:10)  BP: 119/63 (12-26-19 @ 09:10)  RR: 16 (12-26-19 @ 09:10)  SpO2: 100% (12-26-19 @ 09:10)  Wt(kg): --    PHYSICAL EXAM:  General: awake , no acute distress  Eyes:  anicteric, no conjunctival injection, no discharge  Oropharynx: no lesions or injection 	  Neck: supple, without adenopathy  Lungs: poor effort to auscultation  Heart: regular rate and rhythm; no murmur, rubs or gallops  Abdomen: soft, nondistended, nontender, without mass or organomegaly  Skin: no lesions  Extremities: no clubbing, cyanosis, or edema  Neurologic:  awake, aphasic, left side not moving    LAB RESULTS:                        7.1    14.57 )-----------( 522      ( 26 Dec 2019 09:43 )             22.3     12-26    138  |  96  |  95<H>  ----------------------------<  182<H>  5.0   |  15<L>  |  7.60<H>    Ca    8.4      26 Dec 2019 05:40  Phos  5.1     12-25  Mg     2.2     12-25            MICROBIOLOGY:  RECENT CULTURES:  12-24 @ 06:07 .Sputum Sputum Staphylococcus aureus    Numerous Staphylococcus aureus  Moderate Enterobacter aerogenes  Normal Respiratory Rachel present    Numerous polymorphonuclear leukocytes per low power field  Few Squamous epithelial cells per low power field  Numerous Gram positive cocci in pairs, chains and clusters per oil power  field  Numerous Gram Negative Rods per oil power field    12-24 @ 03:56 .Blood Blood     No growth to date.            RADIOLOGY REVIEWED:  < from: CT Abdomen and Pelvis w/ IV Cont (12.24.19 @ 14:37) >    EXAM:  CT ABDOMEN AND PELVIS IC                          EXAM:  CT CHEST IC                            PROCEDURE DATE:  12/24/2019            INTERPRETATION:  CLINICAL INFORMATION: Seizure. Fever.    COMPARISON: None available.    PROCEDURE:   CT of the Chest, Abdomen and Pelvis was performed with intravenous contrast.   Intravenous contrast: 90 ml Omnipaque 350. 10 ml discarded.  Oral contrast: None.  Sagittal and coronal reformats were performed.    FINDINGS:    CHEST:     LUNGS AND LARGE AIRWAYS: Patent central airways. Biapical atelectasis and/or scarring. Bibasilar atelectasis. Nonspecific right upper lobe pulmonary nodules measuring up to 3 mm (4:30).  PLEURA: No pleural effusion.  VESSELS: Right-sided central venous catheter with tipin the cavoatrial junction. Coronary artery and aortic calcifications.  HEART: Heart size is normal. No pericardial effusion.  MEDIASTINUM AND AUSTIN: Subcentimeter mediastinal nodes.  CHEST WALL AND LOWER NECK: Within normal limits.    ABDOMEN AND PELVIS:    LIVER: Within normal limits.  BILE DUCTS: Normal caliber.  GALLBLADDER: Within normal limits.  SPLEEN: Within normal limits.  PANCREAS: Within normal limits.  ADRENALS: Within normal limits.  KIDNEYS/URETERS: Subcentimeter hypoattenuating renal lesions are too small to characterize. No hydronephrosis.    BLADDER: Within normal limits.  REPRODUCTIVE ORGANS: Enlarged prostate.    BOWEL: Enteric catheter with tip in the stomach. No bowel obstruction. Appendix not definitely visualized; no pericecal inflammatory changes.  PERITONEUM: No ascites.  VESSELS: Atherosclerotic disease of the abdominal aorta.  RETROPERITONEUM/LYMPH NODES: No lymphadenopathy.    ABDOMINAL WALL: Within normal limits.  BONES: Mild degenerative changes of the spine.    IMPRESSION:     No drainable fluid collection in the chest, abdomen, and pelvis.    No bowel obstruction.      < end of copied text >          Impression: patient with esrd, cad, hd via permacath, was to have a renal transplant in near future, had episode of afib and low bp on dialysis almost 2 weeks ago and then multiple infarcts, got tpa, came her for status epilepticus now controlled, had been intubated now extubated. He has purulent copious secretions that he has trouble clearing, No ezio pneumonia but may have some component of bronchitis that will improve with a brief course of antibiotics.       Recommendations:  will cover with cefepime for short course, should cover presumed mssa and enterobacter. Avoiding meropenem due to status  pulmonary toilet critical issue

## 2019-12-26 NOTE — CONSULT NOTE ADULT - PROBLEM SELECTOR RECOMMENDATION 6
s/p CARLA in June 2019 c/b ST Depressions. Cards following. TTE in 12/2019 shows normal LVSF and trace MR.   - Recommend to continue to Dual antiplatelet with ASA/Plavix, will consider single antiplatelet agent once on a/c for AF  - coreg 3.125 bid

## 2019-12-26 NOTE — CONSULT NOTE ADULT - PROBLEM SELECTOR RECOMMENDATION 9
- pt failed bedside speech and swallow evaluation   - keep NPO with NGT feeds as pt is at risk for aspiration  - will plan for  upper gastrointestinal endoscopy/peg in am  - hold ngt feeds past midnight, dw neurology team

## 2019-12-26 NOTE — SWALLOW BEDSIDE ASSESSMENT ADULT - SWALLOW EVAL: DIAGNOSIS
Pt presents with a reduced mental status and reduced secretion management requiring nasal trumpet for suctioning. Therefore, PO trials contraindicated at this time.

## 2019-12-26 NOTE — DIETITIAN INITIAL EVALUATION ADULT. - ENERGY NEEDS
Ht: 70"  Wt: 146  BMI: 21.1  kg/m2   IBW: 166 (+/-10%)     88% IBW  Edema:  1+ B/L arm       Skin: no pressure injuries documented

## 2019-12-26 NOTE — SWALLOW BEDSIDE ASSESSMENT ADULT - SLP GENERAL OBSERVATIONS
Pt found in bed, awake upon encounter. Brother at bedside. + NG Tube, + nasal trumpet. Per RN, patient requiring nasal suctioning. Pt follows simple commands. Requires frequent re-direction to task 2/2 lethargy. Pt answering simple questions appropriately, A&O x2. Pt on room air.

## 2019-12-26 NOTE — CONSULT NOTE ADULT - PROBLEM SELECTOR RECOMMENDATION 4
Acute drop in hemoglobin since admission  -Consider checking FOB   -CT A/P negative for RP hematoma
Opal 2/2 AF. MR with "numerous supratentorial and infratentorial infarcts" Also with carotid stenosis. Pending NSGY eval.   - cont lipitor 80 mg qd  - cont asa  - Currently in NSR without rate control agent and off A/c, neuro recommending off a/c for 2 weeks  - team pending GI consult for PEG  - PT/OT

## 2019-12-27 LAB
-  AMIKACIN: SIGNIFICANT CHANGE UP
-  AMOXICILLIN/CLAVULANIC ACID: SIGNIFICANT CHANGE UP
-  AMPICILLIN/SULBACTAM: SIGNIFICANT CHANGE UP
-  AMPICILLIN: SIGNIFICANT CHANGE UP
-  AZTREONAM: SIGNIFICANT CHANGE UP
-  CEFAZOLIN: SIGNIFICANT CHANGE UP
-  CEFEPIME: SIGNIFICANT CHANGE UP
-  CEFOXITIN: SIGNIFICANT CHANGE UP
-  CEFTAZIDIME/AVIBACTAM: SIGNIFICANT CHANGE UP
-  CEFTRIAXONE: SIGNIFICANT CHANGE UP
-  CIPROFLOXACIN: SIGNIFICANT CHANGE UP
-  ERTAPENEM: SIGNIFICANT CHANGE UP
-  GENTAMICIN: SIGNIFICANT CHANGE UP
-  IMIPENEM: SIGNIFICANT CHANGE UP
-  LEVOFLOXACIN: SIGNIFICANT CHANGE UP
-  MEROPENEM: SIGNIFICANT CHANGE UP
-  PIPERACILLIN/TAZOBACTAM: SIGNIFICANT CHANGE UP
-  TOBRAMYCIN: SIGNIFICANT CHANGE UP
-  TRIMETHOPRIM/SULFAMETHOXAZOLE: SIGNIFICANT CHANGE UP
ANION GAP SERPL CALC-SCNC: 19 MMOL/L — HIGH (ref 5–17)
BUN SERPL-MCNC: 37 MG/DL — HIGH (ref 7–23)
CALCIUM SERPL-MCNC: 8.5 MG/DL — SIGNIFICANT CHANGE UP (ref 8.4–10.5)
CHLORIDE SERPL-SCNC: 96 MMOL/L — SIGNIFICANT CHANGE UP (ref 96–108)
CO2 SERPL-SCNC: 23 MMOL/L — SIGNIFICANT CHANGE UP (ref 22–31)
CREAT SERPL-MCNC: 3.75 MG/DL — HIGH (ref 0.5–1.3)
FERRITIN SERPL-MCNC: 1369 NG/ML — HIGH (ref 30–400)
GLUCOSE SERPL-MCNC: 113 MG/DL — HIGH (ref 70–99)
HCT VFR BLD CALC: 24 % — LOW (ref 39–50)
HCT VFR BLD CALC: 29.1 % — LOW (ref 39–50)
HGB BLD-MCNC: 7.8 G/DL — LOW (ref 13–17)
HGB BLD-MCNC: 9.6 G/DL — LOW (ref 13–17)
IRON SATN MFR SERPL: 31 % — SIGNIFICANT CHANGE UP (ref 16–55)
IRON SATN MFR SERPL: 45 UG/DL — SIGNIFICANT CHANGE UP (ref 45–165)
MCHC RBC-ENTMCNC: 28.9 PG — SIGNIFICANT CHANGE UP (ref 27–34)
MCHC RBC-ENTMCNC: 29.4 PG — SIGNIFICANT CHANGE UP (ref 27–34)
MCHC RBC-ENTMCNC: 32.5 GM/DL — SIGNIFICANT CHANGE UP (ref 32–36)
MCHC RBC-ENTMCNC: 33 GM/DL — SIGNIFICANT CHANGE UP (ref 32–36)
MCV RBC AUTO: 88.9 FL — SIGNIFICANT CHANGE UP (ref 80–100)
MCV RBC AUTO: 89 FL — SIGNIFICANT CHANGE UP (ref 80–100)
METHOD TYPE: SIGNIFICANT CHANGE UP
NRBC # BLD: 0 /100 WBCS — SIGNIFICANT CHANGE UP (ref 0–0)
NRBC # BLD: 0 /100 WBCS — SIGNIFICANT CHANGE UP (ref 0–0)
OB PNL STL: NEGATIVE — SIGNIFICANT CHANGE UP
PLATELET # BLD AUTO: 533 K/UL — HIGH (ref 150–400)
PLATELET # BLD AUTO: 572 K/UL — HIGH (ref 150–400)
POTASSIUM SERPL-MCNC: 3.9 MMOL/L — SIGNIFICANT CHANGE UP (ref 3.5–5.3)
POTASSIUM SERPL-SCNC: 3.9 MMOL/L — SIGNIFICANT CHANGE UP (ref 3.5–5.3)
RBC # BLD: 2.7 M/UL — LOW (ref 4.2–5.8)
RBC # BLD: 3.27 M/UL — LOW (ref 4.2–5.8)
RBC # FLD: 15.5 % — HIGH (ref 10.3–14.5)
RBC # FLD: 15.9 % — HIGH (ref 10.3–14.5)
SODIUM SERPL-SCNC: 138 MMOL/L — SIGNIFICANT CHANGE UP (ref 135–145)
TIBC SERPL-MCNC: 145 UG/DL — LOW (ref 220–430)
UIBC SERPL-MCNC: 100 UG/DL — LOW (ref 110–370)
WBC # BLD: 11.06 K/UL — HIGH (ref 3.8–10.5)
WBC # BLD: 14.17 K/UL — HIGH (ref 3.8–10.5)
WBC # FLD AUTO: 11.06 K/UL — HIGH (ref 3.8–10.5)
WBC # FLD AUTO: 14.17 K/UL — HIGH (ref 3.8–10.5)

## 2019-12-27 PROCEDURE — 99233 SBSQ HOSP IP/OBS HIGH 50: CPT | Mod: GC

## 2019-12-27 RX ORDER — ACETAMINOPHEN 500 MG
1000 TABLET ORAL ONCE
Refills: 0 | Status: COMPLETED | OUTPATIENT
Start: 2019-12-27 | End: 2019-12-27

## 2019-12-27 RX ORDER — LEVETIRACETAM 250 MG/1
125 TABLET, FILM COATED ORAL DAILY
Refills: 0 | Status: DISCONTINUED | OUTPATIENT
Start: 2019-12-27 | End: 2020-01-14

## 2019-12-27 RX ORDER — SENNA PLUS 8.6 MG/1
10 TABLET ORAL
Refills: 0 | Status: DISCONTINUED | OUTPATIENT
Start: 2019-12-27 | End: 2020-01-04

## 2019-12-27 RX ORDER — CLOPIDOGREL BISULFATE 75 MG/1
75 TABLET, FILM COATED ORAL DAILY
Refills: 0 | Status: DISCONTINUED | OUTPATIENT
Start: 2019-12-27 | End: 2020-01-03

## 2019-12-27 RX ORDER — ACETAMINOPHEN 500 MG
650 TABLET ORAL EVERY 6 HOURS
Refills: 0 | Status: DISCONTINUED | OUTPATIENT
Start: 2019-12-27 | End: 2019-12-28

## 2019-12-27 RX ORDER — LEVOTHYROXINE SODIUM 125 MCG
50 TABLET ORAL DAILY
Refills: 0 | Status: DISCONTINUED | OUTPATIENT
Start: 2019-12-27 | End: 2020-01-14

## 2019-12-27 RX ORDER — PANTOPRAZOLE SODIUM 20 MG/1
40 TABLET, DELAYED RELEASE ORAL
Refills: 0 | Status: DISCONTINUED | OUTPATIENT
Start: 2019-12-27 | End: 2019-12-27

## 2019-12-27 RX ORDER — PANTOPRAZOLE SODIUM 20 MG/1
40 TABLET, DELAYED RELEASE ORAL
Refills: 0 | Status: DISCONTINUED | OUTPATIENT
Start: 2019-12-27 | End: 2019-12-28

## 2019-12-27 RX ORDER — CALCIUM ACETATE 667 MG
667 TABLET ORAL
Refills: 0 | Status: DISCONTINUED | OUTPATIENT
Start: 2019-12-28 | End: 2020-01-14

## 2019-12-27 RX ORDER — POLYETHYLENE GLYCOL 3350 17 G/17G
17 POWDER, FOR SOLUTION ORAL DAILY
Refills: 0 | Status: DISCONTINUED | OUTPATIENT
Start: 2019-12-27 | End: 2020-01-04

## 2019-12-27 RX ORDER — CARVEDILOL PHOSPHATE 80 MG/1
3.12 CAPSULE, EXTENDED RELEASE ORAL EVERY 12 HOURS
Refills: 0 | Status: DISCONTINUED | OUTPATIENT
Start: 2019-12-27 | End: 2020-01-02

## 2019-12-27 RX ORDER — ASPIRIN/CALCIUM CARB/MAGNESIUM 324 MG
81 TABLET ORAL DAILY
Refills: 0 | Status: DISCONTINUED | OUTPATIENT
Start: 2019-12-27 | End: 2019-12-31

## 2019-12-27 RX ORDER — ATORVASTATIN CALCIUM 80 MG/1
80 TABLET, FILM COATED ORAL AT BEDTIME
Refills: 0 | Status: DISCONTINUED | OUTPATIENT
Start: 2019-12-27 | End: 2020-01-14

## 2019-12-27 RX ADMIN — Medication 400 MILLIGRAM(S): at 08:08

## 2019-12-27 RX ADMIN — ATORVASTATIN CALCIUM 80 MILLIGRAM(S): 80 TABLET, FILM COATED ORAL at 22:05

## 2019-12-27 RX ADMIN — POLYETHYLENE GLYCOL 3350 17 GRAM(S): 17 POWDER, FOR SOLUTION ORAL at 17:26

## 2019-12-27 RX ADMIN — PANTOPRAZOLE SODIUM 40 MILLIGRAM(S): 20 TABLET, DELAYED RELEASE ORAL at 22:04

## 2019-12-27 RX ADMIN — LEVETIRACETAM 400 MILLIGRAM(S): 250 TABLET, FILM COATED ORAL at 17:26

## 2019-12-27 RX ADMIN — HEPARIN SODIUM 5000 UNIT(S): 5000 INJECTION INTRAVENOUS; SUBCUTANEOUS at 17:25

## 2019-12-27 RX ADMIN — Medication 1000 MILLIGRAM(S): at 08:38

## 2019-12-27 RX ADMIN — Medication 50 MICROGRAM(S): at 22:05

## 2019-12-27 RX ADMIN — Medication 81 MILLIGRAM(S): at 17:26

## 2019-12-27 RX ADMIN — CARVEDILOL PHOSPHATE 3.12 MILLIGRAM(S): 80 CAPSULE, EXTENDED RELEASE ORAL at 17:25

## 2019-12-27 RX ADMIN — Medication 2: at 11:56

## 2019-12-27 RX ADMIN — Medication 650 MILLIGRAM(S): at 17:57

## 2019-12-27 RX ADMIN — Medication 3 MILLILITER(S): at 17:25

## 2019-12-27 RX ADMIN — LEVETIRACETAM 400 MILLIGRAM(S): 250 TABLET, FILM COATED ORAL at 05:18

## 2019-12-27 RX ADMIN — CARVEDILOL PHOSPHATE 3.12 MILLIGRAM(S): 80 CAPSULE, EXTENDED RELEASE ORAL at 05:19

## 2019-12-27 RX ADMIN — CLOPIDOGREL BISULFATE 75 MILLIGRAM(S): 75 TABLET, FILM COATED ORAL at 17:26

## 2019-12-27 RX ADMIN — LACOSAMIDE 110 MILLIGRAM(S): 50 TABLET ORAL at 17:33

## 2019-12-27 RX ADMIN — Medication 3 MILLILITER(S): at 05:18

## 2019-12-27 RX ADMIN — LACOSAMIDE 110 MILLIGRAM(S): 50 TABLET ORAL at 05:40

## 2019-12-27 RX ADMIN — Medication 3 MILLILITER(S): at 11:55

## 2019-12-27 RX ADMIN — Medication 650 MILLIGRAM(S): at 17:27

## 2019-12-27 RX ADMIN — SODIUM CHLORIDE 45 MILLILITER(S): 9 INJECTION INTRAMUSCULAR; INTRAVENOUS; SUBCUTANEOUS at 05:18

## 2019-12-27 RX ADMIN — Medication 50 MICROGRAM(S): at 05:19

## 2019-12-27 RX ADMIN — SODIUM CHLORIDE 45 MILLILITER(S): 9 INJECTION INTRAMUSCULAR; INTRAVENOUS; SUBCUTANEOUS at 11:58

## 2019-12-27 RX ADMIN — SODIUM CHLORIDE 45 MILLILITER(S): 9 INJECTION INTRAMUSCULAR; INTRAVENOUS; SUBCUTANEOUS at 22:04

## 2019-12-27 NOTE — PROGRESS NOTE ADULT - ASSESSMENT
65 y/o M with PMH of CAD s/p stents 2009 and 2019, HTN, DM, Carotid stenosis, Hypothyroidism, and ESRD on HD who presented to outside hospital with AMS. Found to have new onset Afib and bilateral extensive bihemispheric infarcts. s/p tPA at outside hospital. Hospital course c/b status epilepticus. Intubated for airway protection, extubated 12/21. Called to eval 12/26 for secretions, leukocytosis. S/p PEG placement 12/27

## 2019-12-27 NOTE — CHART NOTE - NSCHARTNOTEFT_GEN_A_CORE
Contact Note    Initial Nutrition Assessment completed yesterday. Noted plan for PEG placement today, pt currently NPO. Plan for HD today. When EN resumed, recommend Nepro goal rate 50 ml/hr x 24 hours to provide 1200ml formula, 2160 orestes, 97 Gm protein, 872ml free water (provides  32 orestes/Kg and  1.4 Gm protein/Kg based on dosing wt 66.6Kg). Will f/u on EN tolerance. Contact Note    Initial Nutrition Assessment completed yesterday. Noted plan for PEG placement today, pt currently NPO.  When EN resumed, recommend Nepro goal rate 50 ml/hr x 24 hours to provide 1200ml formula, 2160 orestes, 97 Gm protein, 872ml free water (provides  32 orestes/Kg and  1.4 Gm protein/Kg based on dosing wt 66.6Kg). Will f/u on EN tolerance.

## 2019-12-27 NOTE — PROGRESS NOTE ADULT - SUBJECTIVE AND OBJECTIVE BOX
Pre-Endoscopy Evaluation      Referring Physician:         sam                           Procedure: egd/peg    Indication for Procedure: dysphagia    Pertinent History: cva    Sedation by Anesthesia [x ]    PAST MEDICAL & SURGICAL HISTORY:  Hypothyroidism  CRI (Chronic Renal Insufficiency)  CAD (Coronary Artery Disease): with Stents in 2009 and 2019  Dyslipidemia  Diabetes  Hypertension  History of insertion of stent into coronary artery bypass graft:  and       PMH of Gastroparesis [ ]  Gastric Surgery [ ]  Gastric Outlet Obstruction [ ]    Allergies    No Known Allergies    Intolerances        Latex allergy: [ ] yes [x ] no    Medications:MEDICATIONS  (STANDING):  albuterol/ipratropium for Nebulization 3 milliLiter(s) Nebulizer every 6 hours  aspirin  chewable 81 milliGRAM(s) Oral daily  atorvastatin 80 milliGRAM(s) Oral at bedtime  calcium acetate 667 milliGRAM(s) Oral three times a day with meals  carvedilol 3.125 milliGRAM(s) Oral every 12 hours  clopidogrel Tablet 75 milliGRAM(s) Oral <User Schedule>  heparin  Injectable 5000 Unit(s) SubCutaneous every 12 hours  insulin lispro (HumaLOG) corrective regimen sliding scale   SubCutaneous every 6 hours  insulin NPH human recombinant 18 Unit(s) SubCutaneous every 6 hours  lacosamide IVPB 50 milliGRAM(s) IV Intermittent every 12 hours  levETIRAcetam  IVPB 250 milliGRAM(s) IV Intermittent every 12 hours  levETIRAcetam  Solution 125 milliGRAM(s) Oral daily  levothyroxine 50 MICROGram(s) Oral daily  polyethylene glycol 3350 17 Gram(s) Oral daily  senna 2 Tablet(s) Oral at bedtime  sodium chloride 0.9%. 1000 milliLiter(s) (45 mL/Hr) IV Continuous <Continuous>    MEDICATIONS  (PRN):  acetaminophen    Suspension .. 650 milliGRAM(s) Oral every 6 hours PRN Temp greater or equal to 38.5C (101.3F)      Smoking: [ ] yes  [ ] no    AICD/PPM: [ ] yes   [ ] no    Pertinent lab data:                        7.8    14.17 )-----------( 572      ( 27 Dec 2019 01:14 )             24.0     12-    138  |  96  |  37<H>  ----------------------------<  113<H>  3.9   |  23  |  3.75<H>    Ca    8.5      27 Dec 2019 01:14                    Physical Examination:  Daily     Daily Weight in k.3 (26 Dec 2019 21:15)  Vital Signs Last 24 Hrs  T(C): 37.4 (27 Dec 2019 08:15), Max: 37.7 (27 Dec 2019 08:00)  T(F): 99.3 (27 Dec 2019 08:15), Max: 99.8 (27 Dec 2019 08:00)  HR: 88 (27 Dec 2019 08:30) (86 - 96)  BP: 137/63 (27 Dec 2019 08:30) (109/60 - 160/72)  BP(mean): 84 (27 Dec 2019 08:30) (76 - 97)  RR: 22 (27 Dec 2019 08:30) (12 - 24)  SpO2: 98% (27 Dec 2019 08:30) (95% - 100%)    BP:                 HR:                  SPO2:               Temperature:    nad  confused  frail  non toxic  soft, nt  no edema      Comments:    ASA Class: I [ ]  II [ ]  III [x ]  IV [ ]    The patient is a suitable candidate for the planned procedure unless box checked [ ]  No, explain:

## 2019-12-27 NOTE — PROGRESS NOTE ADULT - ASSESSMENT
63 yo male with ESRD on HD (M-W-FRI without AVF as awaiting renal transplant expected within 3 months; Dr. Barrientos; [facility]), CAD s/p stent 2009 and CARLA in June 2019, HTN, DM type 2, hypothyroidism and left carotid stenosis admitted for altered mental status during dialysis, he became unresponsive and arm not moving therefore he was sent to hospital. At [facility], he received tPA on 12/13/19. Was noted to have SAH after tPA. Started on 3% saline. Had seizure, despite multiple AEDs remained in status epilepticus and was transferred to [facility] for continuous EEG monitoring. s/p extubation, PEG tube placement. Is awake and following commands. Nephrology is following for ESRD management.    ESRD on HD MWF  - HD consent in the chart    - last HD 12/26/19 w/ 1U PRBC transfusion; no hypotension    - next HD tomorrow 12/28/19  - please dose Keppra post HD on HD days      Anemia  - Hgb below goal; unable to use epogen due to CVA  - transfuse PRN Hgb <7 or hemodynamically significant bleeding; will get 1U PRBC today  - no evidence of occult bleeding  - GI following     HTN  - well controlled  - continue coreg  - Will avoid hypotension w/ HD

## 2019-12-27 NOTE — PROGRESS NOTE ADULT - SUBJECTIVE AND OBJECTIVE BOX
CC: f/u for low grade fever    Patient reports: he is comfortable with nasal trumpet .    REVIEW OF SYSTEMS:  All other review of systems negative (Comprehensive ROS)    Antimicrobials Day #  :off    Other Medications Reviewed    T(F): 98 (12-27-19 @ 11:30), Max: 99.8 (12-27-19 @ 08:00)  HR: 82 (12-27-19 @ 11:30)  BP: 143/73 (12-27-19 @ 11:30)  RR: 22 (12-27-19 @ 11:30)  SpO2: 99% (12-27-19 @ 11:30)  Wt(kg): --    PHYSICAL EXAM:  General: alert, no acute distress  Eyes:  anicteric, no conjunctival injection, no discharge  Oropharynx: no lesions or injection 	  Neck: supple, without adenopathy  Lungs: scattered ronchi  Heart: regular rate and rhythm; no murmur, rubs or gallops  Abdomen: soft, nondistended, nontender, without mass or organomegaly  Skin: no lesions  Extremities: no clubbing, cyanosis, or edema  Neurologic: alert, oriented,left  side weak    LAB RESULTS:                        7.8    14.17 )-----------( 572      ( 27 Dec 2019 01:14 )             24.0     12-27    138  |  96  |  37<H>  ----------------------------<  113<H>  3.9   |  23  |  3.75<H>    Ca    8.5      27 Dec 2019 01:14          MICROBIOLOGY:  RECENT CULTURES:  12-24 @ 06:07 .Sputum Sputum Staphylococcus aureus    Numerous Staphylococcus aureus  Moderate Enterobacter aerogenes  Normal Respiratory Rachel present    Numerous polymorphonuclear leukocytes per low power field  Few Squamous epithelial cells per low power field  Numerous Gram positive cocci in pairs, chains and clusters per oil power  field  Numerous Gram Negative Rods per oil power field    12-24 @ 03:56 .Blood Blood     No growth to date.          RADIOLOGY REVIEWED:  < from: Xray Chest 1 View- PORTABLE-Routine (12.26.19 @ 13:10) >  IMPRESSION:    Clear lungs.    >

## 2019-12-27 NOTE — PROGRESS NOTE ADULT - SUBJECTIVE AND OBJECTIVE BOX
Patient seen and examined at bedside.    Overnight Events:   No overnight events.     REVIEW OF SYSTEMS:  Constitutional:     [ ] negative [ ] fevers [ ] chills [ ] weight loss [ ] weight gain  HEENT:                  [ ] negative [ ] dry eyes [ ] eye irritation [ ] postnasal drip [ ] nasal congestion  CV:                         [ ] negative  [ ] chest pain [ ] orthopnea [ ] palpitations [ ] murmur  Resp:                     [ ] negative [ ] cough [ ] shortness of breath [ ] dyspnea [ ] wheezing [ ] sputum [ ]hemoptysis  GI:                          [ ] negative [ ] nausea [ ] vomiting [ ] diarrhea [ ] constipation [ ] abd pain [ ] dysphagia   :                        [ ] negative [ ] dysuria [ ] nocturia [ ] hematuria [ ] increased urinary frequency  Musculoskeletal: [ ] negative [ ] back pain [ ] myalgias [ ] arthralgias [ ] fracture  Skin:                       [ ] negative [ ] rash [ ] itch  Neurological:        [ ] negative [ ] headache [ ] dizziness [ ] syncope [ ] weakness [ ] numbness  Psychiatric:           [ ] negative [ ] anxiety [ ] depression  Endocrine:            [ ] negative [ ] diabetes [ ] thyroid problem  Heme/Lymph:      [ ] negative [ ] anemia [ ] bleeding problem  Allergic/Immune: [ ] negative [ ] itchy eyes [ ] nasal discharge [ ] hives [ ] angioedema    [ ] All other systems negative  [x difficult to assess ROS due to    Current Meds:  acetaminophen    Suspension .. 650 milliGRAM(s) Oral every 6 hours PRN  albuterol/ipratropium for Nebulization 3 milliLiter(s) Nebulizer every 6 hours  aspirin  chewable 81 milliGRAM(s) Oral daily  atorvastatin 80 milliGRAM(s) Oral at bedtime  calcium acetate 667 milliGRAM(s) Oral three times a day with meals  carvedilol 3.125 milliGRAM(s) Oral every 12 hours  clopidogrel Tablet 75 milliGRAM(s) Oral <User Schedule>  heparin  Injectable 5000 Unit(s) SubCutaneous every 12 hours  insulin lispro (HumaLOG) corrective regimen sliding scale   SubCutaneous every 6 hours  insulin NPH human recombinant 18 Unit(s) SubCutaneous every 6 hours  lacosamide IVPB 50 milliGRAM(s) IV Intermittent every 12 hours  levETIRAcetam  IVPB 250 milliGRAM(s) IV Intermittent every 12 hours  levETIRAcetam  Solution 125 milliGRAM(s) Oral daily  levothyroxine 50 MICROGram(s) Oral daily  polyethylene glycol 3350 17 Gram(s) Oral daily  senna 2 Tablet(s) Oral at bedtime  sodium chloride 0.9%. 1000 milliLiter(s) IV Continuous <Continuous>      PAST MEDICAL & SURGICAL HISTORY:  Hypothyroidism  CRI (Chronic Renal Insufficiency)  CAD (Coronary Artery Disease): with Stents in 06/2009 and 6/2019  Dyslipidemia  Diabetes  Hypertension  History of insertion of stent into coronary artery bypass graft: 2009 and 2019      Vitals:  T(F): 98.7 (12-27), Max: 99.8 (12-27)  HR: 84 (12-27) (84 - 96)  BP: 139/72 (12-27) (109/60 - 160/72)  RR: 19 (12-27)  SpO2: 96% (12-27)  I&O's Summary    26 Dec 2019 07:01  -  27 Dec 2019 07:00  --------------------------------------------------------  IN: 2405 mL / OUT: 2200 mL / NET: 205 mL    27 Dec 2019 07:01  -  27 Dec 2019 11:08  --------------------------------------------------------  IN: 450 mL / OUT: 0 mL / NET: 450 mL        Physical Exam:  Appearance: No acute distress; well appearing  Eyes: PERRL, EOMI, pink conjunctiva  HENT: NG tube in right nare, left nare with nasal packing  Cardiovascular: RRR, S1, S2, no murmurs, rubs, or gallops; no edema; no JVD  Respiratory: Clear to auscultation bilaterally  Gastrointestinal: soft, non-tender, non-distended with normal bowel sounds  Musculoskeletal: No clubbing; no joint deformity   Neurologic: Non-focal  Lymphatic: No lymphadenopathy  Psychiatry: AAOx3, mood & affect appropriate  Skin: No rashes, ecchymoses, or cyanosis                          7.8    14.17 )-----------( 572      ( 27 Dec 2019 01:14 )             24.0     12-27    138  |  96  |  37<H>  ----------------------------<  113<H>  3.9   |  23  |  3.75<H>    Ca    8.5      27 Dec 2019 01:14                    New ECG(s): Personally reviewed    Echo:  < from: TTE Echo Doppler w/o Cont (12.15.19 @ 08:32) >   1. Left ventricular ejection fraction, by visual estimation, is 60 to   65%.   2. Normal global left ventricular systolic function.   3. Mild mitralannular calcification.   4. Trace mitral valve regurgitation.    < end of copied text >    Stress Testing:     Cath:    Imaging:    Interpretation of Telemetry:  sinus rhythm

## 2019-12-27 NOTE — PROGRESS NOTE ADULT - ASSESSMENT
64 M hx of CAD s/p 2009, 2019, HTN, DM, carotid stenosis, ESRD on HD p/w AMS found to have new afib and embolic CVA.    #afib  -paroxsymal, currently NSR  -suspected etiology for embolic CVA  -CHADsVAsc > 4, currently not on AC  -Neuro feels should hold off for 2 wks for AC  c/w carvedilol 3.215mg BID    #CAD  -currently on ASA/Plavix  - c/w atorvastain 80mg daily  -depending on bleeding risk of CVA, will consider single antiplatelet therapy w/ AC in 2 wks  continue DAPT for now    Feeds  Currently NG tube  Planned for PEG today  Pt had stents placed ~6 months ago  Would continue DAPT through procedure    Please call cardiology with any questions    [place]  Cardiology Fellow  [phone]    All Cardiology service information can be found 24/7 on [email or web address], password: [email or web address]

## 2019-12-27 NOTE — PROGRESS NOTE ADULT - SUBJECTIVE AND OBJECTIVE BOX
THE PATIENT WAS SEEN AND EXAMINED BY ME WITH THE HOUSESTAFF AND STROKE TEAM DURING MORNING ROUNDS.   HPI:  Patient is a 64 year old male with a history of CAD s/p stents 2009 and 2019, HTN, DM, Carotid stenosis, Hypothyroidism, and ESRD on HD who presented to outside hospital with altered mental status as he was unresponsive, aphasic, and not moving any extremities. He was treated with tPA and admitted for observation.  Patient noted to have seizure during hospital course and was started on Keppra.  MRI brain was done which showed bilateral supratentorial and infratentorial infarcts.  Patient was undergoing dialysis when he developed A. fib and was intubated for airway protection.  Neurology following and multiple EEGs were done which showed patient in status epilepticus despite multiple antiepilectics.  Patient was transferred to [facility] for further management. Extubated on 12/21 and transferred to stroke unit.    SUBJECTIVE: No events overnight.  No new neurologic complaints.      acetaminophen    Suspension .. 650 milliGRAM(s) Oral every 6 hours PRN  albuterol/ipratropium for Nebulization 3 milliLiter(s) Nebulizer every 6 hours  aspirin  chewable 81 milliGRAM(s) Oral daily  atorvastatin 80 milliGRAM(s) Oral at bedtime  calcium acetate 667 milliGRAM(s) Oral three times a day with meals  carvedilol 3.125 milliGRAM(s) Oral every 12 hours  clopidogrel Tablet 75 milliGRAM(s) Oral <User Schedule>  heparin  Injectable 5000 Unit(s) SubCutaneous every 12 hours  insulin lispro (HumaLOG) corrective regimen sliding scale   SubCutaneous every 6 hours  insulin NPH human recombinant 18 Unit(s) SubCutaneous every 6 hours  lacosamide IVPB 50 milliGRAM(s) IV Intermittent every 12 hours  levETIRAcetam  IVPB 250 milliGRAM(s) IV Intermittent every 12 hours  levETIRAcetam  Solution 125 milliGRAM(s) Oral daily  levothyroxine 50 MICROGram(s) Oral daily  polyethylene glycol 3350 17 Gram(s) Oral daily  senna 2 Tablet(s) Oral at bedtime  sodium chloride 0.9%. 1000 milliLiter(s) IV Continuous <Continuous>    PHYSICAL EXAM:   Vital Signs Last 24 Hrs  T(C): 36.7 (27 Dec 2019 04:00), Max: 37.3 (26 Dec 2019 22:00)  T(F): 98 (27 Dec 2019 04:00), Max: 99.1 (26 Dec 2019 22:00)  HR: 94 (27 Dec 2019 06:00) (84 - 96)  BP: 142/63 (27 Dec 2019 06:00) (109/60 - 160/72)  BP(mean): 85 (27 Dec 2019 06:00) (76 - 97)  RR: 19 (27 Dec 2019 06:00) (12 - 24)  SpO2: 98% (27 Dec 2019 06:00) (95% - 100%)    General: No acute distress  HEENT: EOM intact, visual fields full  Abdomen: Soft, nontender, nondistended   Extremities: No edema    NEUROLOGICAL EXAM:  Mental status: Awake, alert, Eyes open attentive following commands.   Cranial Nerves: No facial asymmetry, no nystagmus, no dysarthria,  tongue midline  Motor exam: no movement in UE, minimal LE.   Sensation: Intact to light touch   Coordination/ Gait: No dysmetria, gait not tested    LABS:                        7.8    14.17 )-----------( 572      ( 27 Dec 2019 01:14 )             24.0    12-27    138  |  96  |  37<H>  ----------------------------<  113<H>  3.9   |  23  |  3.75<H>    Ca    8.5      27 Dec 2019 01:14    Hemoglobin A1C, Whole Blood: 8.4 % (12-15 @ 11:00)  Hemoglobin A1C, Whole Blood: 8.2 % (12-14 @ 14:04)  Hemoglobin A1C, Whole Blood: 8.2 % (12-14 @ 12:05)    IMAGING: Reviewed by me.     CT Head No Cont (12.24.19)  Multifocal hypoattenuation involving the bilateral frontal and parietal and right occipital lobes is consistent with previously seen acute/subacute infarcts. No CT evidence for hemorrhagic transformation.  No displaced calvarial fracture or appreciable scalp hematoma.    MRI Head No Cont (12.15.19)  Numerous acute diffuse bilateral supratentorial and   infratentorial infarcts.    CT Angio Head/Neck w/ IV Cont (12.14.19)  Mild stenosis of the proximal left internal carotid artery of   up to 30% based on NASCET criteria. No stenosis of the cervical right   carotid artery based on NASCET criteria. Patent cervical vertebral   arteries.  No significant stenosis or occlusion of the major proximal   arterial branches. THE PATIENT WAS SEEN AND EXAMINED BY ME WITH THE HOUSESTAFF AND STROKE TEAM DURING MORNING ROUNDS.   HPI:  Patient is a 64 year old male with a history of CAD s/p stents 2009 and 2019, HTN, DM, Carotid stenosis, Hypothyroidism, and ESRD on HD who presented to outside hospital with altered mental status as he was unresponsive, aphasic, and not moving any extremities. He was treated with tPA and admitted for observation.  Patient noted to have seizure during hospital course and was started on Keppra.  MRI brain was done which showed bilateral supratentorial and infratentorial infarcts.  Patient was undergoing dialysis when he developed A. fib and was intubated for airway protection.  Neurology following and multiple EEGs were done which showed patient in status epilepticus despite multiple antiepilectics.  Patient was transferred to [facility] for further management. Extubated on 12/21 and transferred to stroke unit.    SUBJECTIVE: No events overnight.  No new neurologic complaints.      acetaminophen    Suspension .. 650 milliGRAM(s) Oral every 6 hours PRN  albuterol/ipratropium for Nebulization 3 milliLiter(s) Nebulizer every 6 hours  aspirin  chewable 81 milliGRAM(s) Oral daily  atorvastatin 80 milliGRAM(s) Oral at bedtime  calcium acetate 667 milliGRAM(s) Oral three times a day with meals  carvedilol 3.125 milliGRAM(s) Oral every 12 hours  clopidogrel Tablet 75 milliGRAM(s) Oral <User Schedule>  heparin  Injectable 5000 Unit(s) SubCutaneous every 12 hours  insulin lispro (HumaLOG) corrective regimen sliding scale   SubCutaneous every 6 hours  insulin NPH human recombinant 18 Unit(s) SubCutaneous every 6 hours  lacosamide IVPB 50 milliGRAM(s) IV Intermittent every 12 hours  levETIRAcetam  IVPB 250 milliGRAM(s) IV Intermittent every 12 hours  levETIRAcetam  Solution 125 milliGRAM(s) Oral daily  levothyroxine 50 MICROGram(s) Oral daily  polyethylene glycol 3350 17 Gram(s) Oral daily  senna 2 Tablet(s) Oral at bedtime  sodium chloride 0.9%. 1000 milliLiter(s) IV Continuous <Continuous>    PHYSICAL EXAM:   Vital Signs Last 24 Hrs  T(C): 36.7 (27 Dec 2019 04:00), Max: 37.3 (26 Dec 2019 22:00)  T(F): 98 (27 Dec 2019 04:00), Max: 99.1 (26 Dec 2019 22:00)  HR: 94 (27 Dec 2019 06:00) (84 - 96)  BP: 142/63 (27 Dec 2019 06:00) (109/60 - 160/72)  BP(mean): 85 (27 Dec 2019 06:00) (76 - 97)  RR: 19 (27 Dec 2019 06:00) (12 - 24)  SpO2: 98% (27 Dec 2019 06:00) (95% - 100%)    General: No acute distress  HEENT: EOM intact, visual fields full  Abdomen: Soft, nontender, nondistended   Extremities: No edema    NEUROLOGICAL EXAM:  Mental status: Awake, alert, eyes open, attentive following commands.   Cranial Nerves: No facial asymmetry, no nystagmus, moderate to severe dysarthria,  tongue midline  Motor exam: no movement in UE, minimal LE.   Sensation: Intact to light touch   Coordination/ Gait: No dysmetria, gait not tested    LABS:                        7.8    14.17 )-----------( 572      ( 27 Dec 2019 01:14 )             24.0    12-27    138  |  96  |  37<H>  ----------------------------<  113<H>  3.9   |  23  |  3.75<H>    Ca    8.5      27 Dec 2019 01:14    Hemoglobin A1C, Whole Blood: 8.4 % (12-15 @ 11:00)  Hemoglobin A1C, Whole Blood: 8.2 % (12-14 @ 14:04)  Hemoglobin A1C, Whole Blood: 8.2 % (12-14 @ 12:05)    IMAGING: Reviewed by me.     CT Head No Cont (12.24.19)  Multifocal hypoattenuation involving the bilateral frontal and parietal and right occipital lobes is consistent with previously seen acute/subacute infarcts. No CT evidence for hemorrhagic transformation.  No displaced calvarial fracture or appreciable scalp hematoma.    MRI Head No Cont (12.15.19)  Numerous acute diffuse bilateral supratentorial and   infratentorial infarcts.    CT Angio Head/Neck w/ IV Cont (12.14.19)  Mild stenosis of the proximal left internal carotid artery of   up to 30% based on NASCET criteria. No stenosis of the cervical right   carotid artery based on NASCET criteria. Patent cervical vertebral   arteries.  No significant stenosis or occlusion of the major proximal   arterial branches.

## 2019-12-27 NOTE — PROGRESS NOTE ADULT - ASSESSMENT
65 yo male with ESRD, HD, PAF, admitted with multiple CVA's.  He has had difficulty with pulmonary toilet and airway secretions.  He has been hemodynamically stable with clear CXR and CT scan.  Peg planned, currently in NSR.  Suggest:  1. monitor off antibiotics  2.peg per GI  3. low threshold for antibiotics for pulmonary coverage, agree with simple observation for now.

## 2019-12-27 NOTE — PROGRESS NOTE ADULT - PROBLEM SELECTOR PLAN 3
Blood cultures, limited flu swab negative   -WBC downtrending  -Consider checking UA/urine culture  -Sputum culture: MSSA and Enterobacter but no radiographic evidence of PNA. Likely colonizing.

## 2019-12-27 NOTE — PROGRESS NOTE ADULT - SUBJECTIVE AND OBJECTIVE BOX
Follow-up Pulm Progress Note    S/p PEG placement this AM  Less secretions today per RN at bedside  Sats 98% RA    Medications:  MEDICATIONS  (STANDING):  albuterol/ipratropium for Nebulization 3 milliLiter(s) Nebulizer every 6 hours  aspirin  chewable 81 milliGRAM(s) Oral daily  atorvastatin 80 milliGRAM(s) Oral at bedtime  carvedilol 3.125 milliGRAM(s) Oral every 12 hours  clopidogrel Tablet 75 milliGRAM(s) Oral daily  heparin  Injectable 5000 Unit(s) SubCutaneous every 12 hours  insulin lispro (HumaLOG) corrective regimen sliding scale   SubCutaneous every 6 hours  insulin NPH human recombinant 18 Unit(s) SubCutaneous every 6 hours  lacosamide IVPB 50 milliGRAM(s) IV Intermittent every 12 hours  levETIRAcetam  IVPB 250 milliGRAM(s) IV Intermittent every 12 hours  levETIRAcetam  Solution 125 milliGRAM(s) Oral daily  levothyroxine 50 MICROGram(s) Oral daily  pantoprazole    Tablet 40 milliGRAM(s) Oral two times a day  polyethylene glycol 3350 17 Gram(s) Oral daily  sodium chloride 0.9%. 1000 milliLiter(s) (45 mL/Hr) IV Continuous <Continuous>    MEDICATIONS  (PRN):  acetaminophen    Suspension .. 650 milliGRAM(s) Oral every 6 hours PRN Temp greater or equal to 38C (100.4F), Mild Pain (1 - 3), Moderate Pain (4 - 6), Severe Pain (7 - 10)  senna Syrup 10 milliLiter(s) Oral two times a day PRN Constipation          Vital Signs Last 24 Hrs  T(C): 36.7 (27 Dec 2019 14:00), Max: 37.7 (27 Dec 2019 08:00)  T(F): 98.1 (27 Dec 2019 14:00), Max: 99.8 (27 Dec 2019 08:00)  HR: 87 (27 Dec 2019 16:00) (79 - 96)  BP: 151/77 (27 Dec 2019 16:00) (135/66 - 160/76)  BP(mean): 96 (27 Dec 2019 16:00) (84 - 104)  RR: 16 (27 Dec 2019 16:00) (13 - 24)  SpO2: 100% (27 Dec 2019 16:00) (96% - 100%)          12-26 @ 07:01  -  12-27 @ 07:00  --------------------------------------------------------  IN: 2405 mL / OUT: 2200 mL / NET: 205 mL          LABS:                        9.6    11.06 )-----------( 533      ( 27 Dec 2019 14:35 )             29.1     12-27    138  |  96  |  37<H>  ----------------------------<  113<H>  3.9   |  23  |  3.75<H>    Ca    8.5      27 Dec 2019 01:14            CAPILLARY BLOOD GLUCOSE      POCT Blood Glucose.: 153 mg/dL (27 Dec 2019 11:49)            CULTURES:     Culture - Blood (collected 12-24-19 @ 03:56)  Source: .Blood Blood  Preliminary Report (12-25-19 @ 04:01):    No growth to date.    Culture - Blood (collected 12-24-19 @ 03:56)  Source: .Blood Blood  Preliminary Report (12-25-19 @ 04:01):    No growth to date.    Culture - Blood (collected 12-18-19 @ 00:23)  Source: .Blood Blood  Final Report (12-23-19 @ 01:01):    No growth at 5 days.                Physical Examination:  PULM: Clear to auscultation bilaterally, no significant sputum production  CVS: S1, S2 heard    RADIOLOGY REVIEWED  CXR:     CT chest:    TTE:

## 2019-12-27 NOTE — PROGRESS NOTE ADULT - PROBLEM SELECTOR PLAN 1
with white-yellow secretions  -CT chest 12/24 negative for PNA  -Sputum culture: MSSA, Enterobacter could be colonizing   -RVP negative  -Observing off abx  -Patient is high risk for aspiration given encephalopathy, would monitor clinically.  -Less secretions today

## 2019-12-27 NOTE — PROGRESS NOTE ADULT - ASSESSMENT
64 year old male that presented to outside hospital with altered mental status and treated with tPA, now transferred to [facility] with status epilepticus. 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     NEURO: Continue close monitoring for neurologic deterioration, permissive HTN to gradual normotension, continue on high dose statin. Found to be in status epilepticus at OSH- continue on vimpat, Keppra (redose post HD). Carotid dopplers showed: R ICA 70-99% stenosis; L ICA 50-69% stenosis. Continue medical management. MRI Brain w/o contrast noted above. OT for splints. Physical therapy pending.     ANTITHROMBOTIC THERAPY: ASA and Plavix in setting of history of CAD and in setting of carotid stenosis. AC in setting of afib to be considered pending clinical course and once stable enteral access established.     PULMONARY: CXR clear on 12/23, protecting airway, saturating well. Continue aggressive suctioning. Pulmonary consulted.      CARDIOVASCULAR: TTE Left ventricular ejection fraction, by visual estimation, is 60 to 65%. Trace mitral valve regurgitation. Cardiac monitoring shows Afib at times, currently in NSR.  s/p CARLA in June 2019 c/b ST Depressions. continue aspirin and plavix. Plan for anticoagulation pending clinical course and when stable enteral access established.                            SBP goal: Normotension, avoid hypotension    GASTROINTESTINAL:  dysphagia screen failed. S&S evaluation pending, Plan to consult GI for PEG.     Diet: NPO with TF.    RENAL: CKD stage V awaiting transplant on HD M/W/F, s/p HD on 12/24, next HD on 12/26. Keppra dosed post HD days.      Na Goal: Greater than 135     Miranda: No    HEMATOLOGY: H/H ?, Anemia likely in setting of ESRD. s/p 1 unit of PRBC on 12/26. Platelets ?- thrombocytosis ; LE doppler negative for DVT on 12/20.     DVT ppx: Heparin s.c    ID: afebrile, leukocytosis? Sputum cultures showed numerous Staph aureus, Moderate Enterobacter. Normal Respiratory giovanni. Monitor off abx for now. CT Chest/Abd/Pelvis on 12/24 negative for source of infection. ID consult pending.    OTHER: Plan endorsed to patient and son over the phone. All questions and concerns addressed. Medicine consulted.     DISPOSITION: Rehab or home depending on PT eval once stable and workup is complete    CORE MEASURES:        Admission NIHSS:      TPA: YES at OSH      LDL/HDL: 52/56     Depression Screen: P     Statin Therapy: YES     Dysphagia Screen: FAIL     Smoking  NO      Afib YES     Stroke Education YES    Obtain screening lower extremity venous ultrasound in patients who meet 1 or more of the following criteria as patient is high risk for DVT/PE on admission:   [] History of DVT/PE  []Hypercoagulable states (Factor V Leiden, Cancer, OCP, etc. )  []Prolonged immobility (hemiplegia/hemiparesis/post operative or any other extended immobilization)  [] Transferred from outside facility (Rehab or Long term care)  [] Age </= to 50 64 year old male that presented to outside hospital with altered mental status and treated with tPA, now transferred to [facility] with status epilepticus. 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     NEURO: Neurologically without acute change, Continue close monitoring for neurologic deterioration, permissive HTN to gradual normotension, continue on high dose statin. Found to be in status epilepticus at OSH- continue on vimpat, Keppra (redose post HD). Carotid dopplers showed: R ICA 70-99% stenosis; L ICA 50-69% stenosis. Continue medical management. MRI Brain w/o contrast noted above. OT for splints. Physical therapy recommended OK.     ANTITHROMBOTIC THERAPY: ASA and Plavix in setting of history of CAD and in setting of carotid stenosis. AC in setting of afib to be considered pending clinical course and once stable enteral access established.     PULMONARY: CXR clear on 12/23, protecting airway, saturating well. Continue aggressive suctioning. Pulmonary consulted.      CARDIOVASCULAR: TTE Left ventricular ejection fraction, by visual estimation, is 60 to 65%. Trace mitral valve regurgitation. Cardiac monitoring shows Afib at times, currently in NSR.  s/p CARLA in June 2019 c/b ST Depressions. Continue aspirin and plavix. Plan for anticoagulation pending clinical course and when stable enteral access established.                            SBP goal: Normotension, avoid hypotension    GASTROINTESTINAL:  dysphagia screen failed. S&S evaluation deferred further testing. Plan for EGD/PEG.      Diet: NPO    RENAL: CKD stage V awaiting transplant on HD M/W/F, s/p HD on 12/24, next HD on 12/26. Keppra dosed post HD days.      Na Goal: Greater than 135     Miranda: No    HEMATOLOGY: H/H shows anemia likely in setting of ESRD. occult negative, s/p 1 unit of PRBC on 12/26. Will give another unit prior to PEG.  LE doppler negative for DVT on 12/20.     DVT ppx: Heparin s.c    ID: afebrile, leukocytosis Sputum cultures showed numerous Staph aureus, Moderate Enterobacter. Normal Respiratory giovanni. Monitor off abx for now. CT Chest/Abd/Pelvis on 12/24 negative for source of infection. ID followup pending.    OTHER: Plan endorsed to patient and son. All questions and concerns addressed. Medicine consulted.     DISPOSITION: OK once medically stable    CORE MEASURES:        Admission NIHSS:      TPA: YES at OSH      LDL/HDL: 52/56     Depression Screen: unable to assess     Statin Therapy: YES     Dysphagia Screen: FAIL     Smoking  NO      Afib YES     Stroke Education YES    Obtain screening lower extremity venous ultrasound in patients who meet 1 or more of the following criteria as patient is high risk for DVT/PE on admission:   [] History of DVT/PE  []Hypercoagulable states (Factor V Leiden, Cancer, OCP, etc. )  []Prolonged immobility (hemiplegia/hemiparesis/post operative or any other extended immobilization)  [] Transferred from outside facility (Rehab or Long term care)  [] Age </= to 50

## 2019-12-27 NOTE — PROGRESS NOTE ADULT - SUBJECTIVE AND OBJECTIVE BOX
[facility]   MD Yokasta Gary D.O. Fatima Sheikh, D.O. Ruoru Wong, PA    From 7 AM - 5 PM:  OFFICE: [phone]  Dr. Barrientos cell: [phone]  Dr. Lopes cell: [phone]  Dr. Witt cell: [phone]  WALDO Valentin cell: [phone]    From 5 PM - 7 AM: Answering Service: [phone]        RENAL FOLLOW UP NOTE  --------------------------------------------------------------------------------  HPI: Pt seen and examined. Has no complaints today. Denies any cough, fever, chills, N/V, SOB. s/p PEG placement.        PAST HISTORY  --------------------------------------------------------------------------------  No significant changes to PMH, PSH, FHx, SHx, unless otherwise noted    ALLERGIES & MEDICATIONS  --------------------------------------------------------------------------------  Allergies    No Known Allergies    Intolerances      Standing Inpatient Medications  albuterol/ipratropium for Nebulization 3 milliLiter(s) Nebulizer every 6 hours  aspirin  chewable 81 milliGRAM(s) Oral daily  atorvastatin 80 milliGRAM(s) Oral at bedtime  carvedilol 3.125 milliGRAM(s) Oral every 12 hours  clopidogrel Tablet 75 milliGRAM(s) Oral daily  heparin  Injectable 5000 Unit(s) SubCutaneous every 12 hours  insulin lispro (HumaLOG) corrective regimen sliding scale   SubCutaneous every 6 hours  insulin NPH human recombinant 18 Unit(s) SubCutaneous every 6 hours  lacosamide IVPB 50 milliGRAM(s) IV Intermittent every 12 hours  levETIRAcetam  IVPB 250 milliGRAM(s) IV Intermittent every 12 hours  levETIRAcetam  Solution 125 milliGRAM(s) Oral daily  levothyroxine 50 MICROGram(s) Oral daily  polyethylene glycol 3350 17 Gram(s) Oral daily  sodium chloride 0.9%. 1000 milliLiter(s) IV Continuous <Continuous>    PRN Inpatient Medications  acetaminophen    Suspension .. 650 milliGRAM(s) Oral every 6 hours PRN  senna Syrup 10 milliLiter(s) Oral two times a day PRN      REVIEW OF SYSTEMS  --------------------------------------------------------------------------------  General: no fever  CVS: no chest pain  RESP: no sob, no cough  ABD: no abdominal pain  : no dysuria,  MSK: no edema     VITALS/PHYSICAL EXAM  --------------------------------------------------------------------------------  T(C): 36.7 (12-27-19 @ 11:30), Max: 37.7 (12-27-19 @ 08:00)  HR: 80 (12-27-19 @ 14:00) (79 - 96)  BP: 160/76 (12-27-19 @ 14:00) (114/66 - 160/76)  RR: 19 (12-27-19 @ 14:00) (13 - 24)  SpO2: 100% (12-27-19 @ 14:00) (96% - 100%)  Wt(kg): --        12-26-19 @ 07:01  -  12-27-19 @ 07:00  --------------------------------------------------------  IN: 2405 mL / OUT: 2200 mL / NET: 205 mL    12-27-19 @ 07:01  -  12-27-19 @ 15:12  --------------------------------------------------------  IN: 450 mL / OUT: 0 mL / NET: 450 mL      Physical Exam:  	Gen: NAD  	HEENT: MMM  	Pulm: CTA B/L  	CV: S1S2  	Abd: Soft, +BS  	Ext: No LE edema B/L              Neuro: Awake, following commands, coherent speech  	Skin: Warm and Dry   	Vascular access: [facility] in place, dressing c/d/i    LABS/STUDIES  --------------------------------------------------------------------------------              9.6    11.06 >-----------<  533      [12-27-19 @ 14:35]              29.1     138  |  96  |  37  ----------------------------<  113      [12-27-19 @ 01:14]  3.9   |  23  |  3.75        Ca     8.5     [12-27-19 @ 01:14]            Creatinine Trend:  SCr 3.75 [12-27 @ 01:14]  SCr 7.60 [12-26 @ 05:40]  SCr 4.77 [12-25 @ 00:29]  SCr 7.00 [12-24 @ 01:15]  SCr 3.60 [12-23 @ 00:18]    Urinalysis - [12-24-19 @ 01:57]      Color Yellow / Appearance Clear / SG 1.017 / pH 6.5      Gluc 500 mg/dL / Ketone Negative  / Bili Negative / Urobili Negative       Blood Small / Protein 300 mg/dL / Leuk Est Negative / Nitrite Negative      RBC 3 / WBC 4 / Hyaline 1 / Gran  / Sq Epi  / Non Sq Epi 1 / Bacteria Negative      Iron 45, TIBC 145, %sat 31      [12-26-19 @ 23:24]  Ferritin 1369      [12-26-19 @ 23:24]  PTH -- (Ca 6.9)      [06-05-19 @ 08:14]   562  HbA1c 8.4      [12-15-19 @ 11:00]  TSH 0.88      [12-19-19 @ 10:33]  Lipid: chol 134, , HDL 56, LDL 52      [12-15-19 @ 10:56]    HBsAb >1000.0      [12-14-19 @ 23:48]  HBsAg Nonreact      [12-14-19 @ 23:48]  HCV 0.15, Nonreact      [12-14-19 @ 23:48]

## 2019-12-28 LAB
-  AMIKACIN: SIGNIFICANT CHANGE UP
-  AMOXICILLIN/CLAVULANIC ACID: SIGNIFICANT CHANGE UP
-  AMPICILLIN/SULBACTAM: SIGNIFICANT CHANGE UP
-  AMPICILLIN: SIGNIFICANT CHANGE UP
-  AZTREONAM: SIGNIFICANT CHANGE UP
-  CEFAZOLIN: SIGNIFICANT CHANGE UP
-  CEFEPIME: SIGNIFICANT CHANGE UP
-  CEFOXITIN: SIGNIFICANT CHANGE UP
-  CEFTRIAXONE: SIGNIFICANT CHANGE UP
-  CIPROFLOXACIN: SIGNIFICANT CHANGE UP
-  ERTAPENEM: SIGNIFICANT CHANGE UP
-  GENTAMICIN: SIGNIFICANT CHANGE UP
-  IMIPENEM: SIGNIFICANT CHANGE UP
-  LEVOFLOXACIN: SIGNIFICANT CHANGE UP
-  MEROPENEM: SIGNIFICANT CHANGE UP
-  PIPERACILLIN/TAZOBACTAM: SIGNIFICANT CHANGE UP
-  TOBRAMYCIN: SIGNIFICANT CHANGE UP
-  TRIMETHOPRIM/SULFAMETHOXAZOLE: SIGNIFICANT CHANGE UP
ANION GAP SERPL CALC-SCNC: 24 MMOL/L — HIGH (ref 5–17)
BUN SERPL-MCNC: 68 MG/DL — HIGH (ref 7–23)
CALCIUM SERPL-MCNC: 8.1 MG/DL — LOW (ref 8.4–10.5)
CHLORIDE SERPL-SCNC: 97 MMOL/L — SIGNIFICANT CHANGE UP (ref 96–108)
CO2 SERPL-SCNC: 17 MMOL/L — LOW (ref 22–31)
CREAT SERPL-MCNC: 6.41 MG/DL — HIGH (ref 0.5–1.3)
CULTURE RESULTS: SIGNIFICANT CHANGE UP
GLUCOSE SERPL-MCNC: 148 MG/DL — HIGH (ref 70–99)
HCT VFR BLD CALC: 30 % — LOW (ref 39–50)
HGB BLD-MCNC: 9.6 G/DL — LOW (ref 13–17)
MCHC RBC-ENTMCNC: 29 PG — SIGNIFICANT CHANGE UP (ref 27–34)
MCHC RBC-ENTMCNC: 32 GM/DL — SIGNIFICANT CHANGE UP (ref 32–36)
MCV RBC AUTO: 90.6 FL — SIGNIFICANT CHANGE UP (ref 80–100)
METHOD TYPE: SIGNIFICANT CHANGE UP
NRBC # BLD: 0 /100 WBCS — SIGNIFICANT CHANGE UP (ref 0–0)
ORGANISM # SPEC MICROSCOPIC CNT: SIGNIFICANT CHANGE UP
PLATELET # BLD AUTO: 446 K/UL — HIGH (ref 150–400)
POTASSIUM SERPL-MCNC: 4.5 MMOL/L — SIGNIFICANT CHANGE UP (ref 3.5–5.3)
POTASSIUM SERPL-SCNC: 4.5 MMOL/L — SIGNIFICANT CHANGE UP (ref 3.5–5.3)
RBC # BLD: 3.31 M/UL — LOW (ref 4.2–5.8)
RBC # FLD: 16.8 % — HIGH (ref 10.3–14.5)
SODIUM SERPL-SCNC: 138 MMOL/L — SIGNIFICANT CHANGE UP (ref 135–145)
SPECIMEN SOURCE: SIGNIFICANT CHANGE UP
WBC # BLD: 9.79 K/UL — SIGNIFICANT CHANGE UP (ref 3.8–10.5)
WBC # FLD AUTO: 9.79 K/UL — SIGNIFICANT CHANGE UP (ref 3.8–10.5)

## 2019-12-28 PROCEDURE — ZZZZZ: CPT

## 2019-12-28 PROCEDURE — 99233 SBSQ HOSP IP/OBS HIGH 50: CPT

## 2019-12-28 RX ORDER — ACETAMINOPHEN 500 MG
650 TABLET ORAL EVERY 4 HOURS
Refills: 0 | Status: DISCONTINUED | OUTPATIENT
Start: 2019-12-28 | End: 2020-01-05

## 2019-12-28 RX ORDER — LANOLIN ALCOHOL/MO/W.PET/CERES
3 CREAM (GRAM) TOPICAL AT BEDTIME
Refills: 0 | Status: DISCONTINUED | OUTPATIENT
Start: 2019-12-28 | End: 2020-01-03

## 2019-12-28 RX ORDER — PANTOPRAZOLE SODIUM 20 MG/1
40 TABLET, DELAYED RELEASE ORAL
Refills: 0 | Status: DISCONTINUED | OUTPATIENT
Start: 2019-12-28 | End: 2020-01-05

## 2019-12-28 RX ORDER — LEVETIRACETAM 250 MG/1
250 TABLET, FILM COATED ORAL
Refills: 0 | Status: DISCONTINUED | OUTPATIENT
Start: 2019-12-28 | End: 2020-01-14

## 2019-12-28 RX ORDER — LACOSAMIDE 50 MG/1
50 TABLET ORAL
Refills: 0 | Status: DISCONTINUED | OUTPATIENT
Start: 2019-12-28 | End: 2020-01-14

## 2019-12-28 RX ADMIN — HEPARIN SODIUM 5000 UNIT(S): 5000 INJECTION INTRAVENOUS; SUBCUTANEOUS at 05:56

## 2019-12-28 RX ADMIN — LACOSAMIDE 110 MILLIGRAM(S): 50 TABLET ORAL at 05:56

## 2019-12-28 RX ADMIN — CARVEDILOL PHOSPHATE 3.12 MILLIGRAM(S): 80 CAPSULE, EXTENDED RELEASE ORAL at 05:56

## 2019-12-28 RX ADMIN — Medication 667 MILLIGRAM(S): at 20:33

## 2019-12-28 RX ADMIN — Medication 650 MILLIGRAM(S): at 09:07

## 2019-12-28 RX ADMIN — HEPARIN SODIUM 5000 UNIT(S): 5000 INJECTION INTRAVENOUS; SUBCUTANEOUS at 20:34

## 2019-12-28 RX ADMIN — Medication 650 MILLIGRAM(S): at 00:02

## 2019-12-28 RX ADMIN — Medication 2: at 00:10

## 2019-12-28 RX ADMIN — Medication 650 MILLIGRAM(S): at 00:32

## 2019-12-28 RX ADMIN — Medication 50 MICROGRAM(S): at 05:56

## 2019-12-28 RX ADMIN — LACOSAMIDE 50 MILLIGRAM(S): 50 TABLET ORAL at 20:33

## 2019-12-28 RX ADMIN — LEVETIRACETAM 400 MILLIGRAM(S): 250 TABLET, FILM COATED ORAL at 05:56

## 2019-12-28 RX ADMIN — Medication 3 MILLILITER(S): at 20:33

## 2019-12-28 RX ADMIN — POLYETHYLENE GLYCOL 3350 17 GRAM(S): 17 POWDER, FOR SOLUTION ORAL at 11:41

## 2019-12-28 RX ADMIN — Medication 3 MILLILITER(S): at 11:41

## 2019-12-28 RX ADMIN — CARVEDILOL PHOSPHATE 3.12 MILLIGRAM(S): 80 CAPSULE, EXTENDED RELEASE ORAL at 20:33

## 2019-12-28 RX ADMIN — ATORVASTATIN CALCIUM 80 MILLIGRAM(S): 80 TABLET, FILM COATED ORAL at 22:41

## 2019-12-28 RX ADMIN — Medication 2: at 11:58

## 2019-12-28 RX ADMIN — Medication 650 MILLIGRAM(S): at 04:50

## 2019-12-28 RX ADMIN — LEVETIRACETAM 125 MILLIGRAM(S): 250 TABLET, FILM COATED ORAL at 20:34

## 2019-12-28 RX ADMIN — Medication 667 MILLIGRAM(S): at 11:42

## 2019-12-28 RX ADMIN — PANTOPRAZOLE SODIUM 40 MILLIGRAM(S): 20 TABLET, DELAYED RELEASE ORAL at 20:33

## 2019-12-28 RX ADMIN — PANTOPRAZOLE SODIUM 40 MILLIGRAM(S): 20 TABLET, DELAYED RELEASE ORAL at 05:56

## 2019-12-28 RX ADMIN — Medication 81 MILLIGRAM(S): at 11:42

## 2019-12-28 RX ADMIN — HUMAN INSULIN 18 UNIT(S): 100 INJECTION, SUSPENSION SUBCUTANEOUS at 22:42

## 2019-12-28 RX ADMIN — Medication 3 MILLILITER(S): at 05:55

## 2019-12-28 RX ADMIN — Medication 3 MILLIGRAM(S): at 22:41

## 2019-12-28 RX ADMIN — Medication 667 MILLIGRAM(S): at 08:37

## 2019-12-28 RX ADMIN — Medication 650 MILLIGRAM(S): at 04:20

## 2019-12-28 RX ADMIN — Medication 3 MILLILITER(S): at 00:04

## 2019-12-28 RX ADMIN — Medication 650 MILLIGRAM(S): at 08:37

## 2019-12-28 RX ADMIN — CLOPIDOGREL BISULFATE 75 MILLIGRAM(S): 75 TABLET, FILM COATED ORAL at 11:42

## 2019-12-28 RX ADMIN — LEVETIRACETAM 250 MILLIGRAM(S): 250 TABLET, FILM COATED ORAL at 22:41

## 2019-12-28 RX ADMIN — Medication 650 MILLIGRAM(S): at 20:36

## 2019-12-28 RX ADMIN — Medication 650 MILLIGRAM(S): at 21:06

## 2019-12-28 NOTE — PROGRESS NOTE ADULT - PROBLEM SELECTOR PLAN 4
Opal 2/2 AF. MR with "numerous supratentorial and infratentorial infarcts" Also with carotid stenosis.   - cont lipitor 80 mg qd  - cont asa  - Currently in NSR without rate control agent and off A/c, neuro recommending off a/c for 2 weeks  - s/p peg   - PT/OT.

## 2019-12-28 NOTE — PROGRESS NOTE ADULT - SUBJECTIVE AND OBJECTIVE BOX
Patient is a 64y old  Male who presents with a chief complaint of Seizures (28 Dec 2019 15:04)      SUBJECTIVE / OVERNIGHT EVENTS: family at bedside, pt feels ok, no cp, breathing is ok     MEDICATIONS  (STANDING):  albuterol/ipratropium for Nebulization 3 milliLiter(s) Nebulizer every 6 hours  aspirin  chewable 81 milliGRAM(s) Oral daily  atorvastatin 80 milliGRAM(s) Oral at bedtime  calcium acetate 667 milliGRAM(s) Oral three times a day with meals  carvedilol 3.125 milliGRAM(s) Oral every 12 hours  clopidogrel Tablet 75 milliGRAM(s) Oral daily  heparin  Injectable 5000 Unit(s) SubCutaneous every 12 hours  insulin lispro (HumaLOG) corrective regimen sliding scale   SubCutaneous every 6 hours  insulin NPH human recombinant 18 Unit(s) SubCutaneous every 6 hours  lacosamide Solution 50 milliGRAM(s) Oral two times a day  levETIRAcetam  Solution 250 milliGRAM(s) Oral two times a day  levETIRAcetam  Solution 125 milliGRAM(s) Oral daily  levothyroxine 50 MICROGram(s) Oral daily  pantoprazole   Suspension 40 milliGRAM(s) Oral two times a day  polyethylene glycol 3350 17 Gram(s) Oral daily    MEDICATIONS  (PRN):  acetaminophen    Suspension .. 650 milliGRAM(s) Oral every 4 hours PRN Temp greater or equal to 38C (100.4F), Mild Pain (1 - 3), Moderate Pain (4 - 6), Severe Pain (7 - 10)  senna Syrup 10 milliLiter(s) Oral two times a day PRN Constipation        CAPILLARY BLOOD GLUCOSE      POCT Blood Glucose.: 107 mg/dL (28 Dec 2019 14:59)  POCT Blood Glucose.: 191 mg/dL (28 Dec 2019 11:55)  POCT Blood Glucose.: 139 mg/dL (28 Dec 2019 05:54)  POCT Blood Glucose.: 191 mg/dL (28 Dec 2019 00:09)  POCT Blood Glucose.: 115 mg/dL (27 Dec 2019 17:29)    I&O's Summary    27 Dec 2019 07:01  -  28 Dec 2019 07:00  --------------------------------------------------------  IN: 1070 mL / OUT: 300 mL / NET: 770 mL    28 Dec 2019 07:01  -  28 Dec 2019 15:35  --------------------------------------------------------  IN: 280 mL / OUT: 0 mL / NET: 280 mL        PHYSICAL EXAM:  GENERAL: NAD, well-developed. nc in place   HEAD:  Atraumatic, Normocephalic  EYES:conjunctiva and sclera clear  NECK: No JVD  CHEST/LUNG: Clear to auscultation bilaterally; No wheeze  HEART: Regular rate and rhythm; S1S2  ABDOMEN: Soft, Nontender, Nondistended; Bowel sounds present, peg  EXTREMITIES:  No clubbing, cyanosis, or edema      LABS:                        9.6    9.79  )-----------( 446      ( 28 Dec 2019 07:18 )             30.0     12-28    138  |  97  |  68<H>  ----------------------------<  148<H>  4.5   |  17<L>  |  6.41<H>    Ca    8.1<L>      28 Dec 2019 07:18                RADIOLOGY & ADDITIONAL TESTS:    Imaging Personally Reviewed:    Consultant(s) Notes Reviewed:      Care Discussed with Consultants/Other Providers:

## 2019-12-28 NOTE — PROGRESS NOTE ADULT - SUBJECTIVE AND OBJECTIVE BOX
CC: f/u for pneumonia    Patient reports: he is alert and attentive, not verbal    REVIEW OF SYSTEMS:  All other review of systems negative (Comprehensive ROS): limited by condition    Antimicrobials Day #  :off    Other Medications Reviewed    T(F): 97.7 (12-28-19 @ 06:00), Max: 99.8 (12-27-19 @ 08:00)  HR: 77 (12-28-19 @ 06:00)  BP: 157/71 (12-28-19 @ 06:00)  RR: 19 (12-28-19 @ 06:00)  SpO2: 99% (12-28-19 @ 06:00)  Wt(kg): --    PHYSICAL EXAM:  General: alert, no acute distress  Eyes:  anicteric, no conjunctival injection, no discharge  Oropharynx: no lesions or injection 	  Neck: supple, without adenopathy  Lungs: scattered ronchi  Heart: regular rate and rhythm; no murmur, rubs or gallops  Abdomen: soft, nondistended, nontender, peg in place  Skin: no lesions  Extremities: no clubbing, cyanosis, or edema  Neurologic: alert, attentive,limited movement of extremities    LAB RESULTS:                        9.6    9.79  )-----------( 446      ( 28 Dec 2019 07:18 )             30.0     12-27    138  |  96  |  37<H>  ----------------------------<  113<H>  3.9   |  23  |  3.75<H>    Ca    8.5      27 Dec 2019 01:14          MICROBIOLOGY:  RECENT CULTURES:  12-24 @ 06:07 .Sputum Sputum Staphylococcus aureus  Enterobacter aerogenes    Numerous Staphylococcus aureus  Moderate Enterobacter aerogenes  Moderate Enterobacter cloacae complex  Normal Respiratory Rachel present    Numerous polymorphonuclear leukocytes per low power field  Few Squamous epithelial cells per low power field  Numerous Gram positive cocci in pairs, chains and clusters per oil power  field  Numerous Gram Negative Rods per oil power field    12-24 @ 03:56 .Blood Blood     No growth to date.          RADIOLOGY REVIEWED:  < from: Xray Chest 1 View- PORTABLE-Routine (12.26.19 @ 13:10) >    IMPRESSION:    Clear lungs.    < end of copied text >

## 2019-12-28 NOTE — PROGRESS NOTE ADULT - SUBJECTIVE AND OBJECTIVE BOX
THE PATIENT WAS SEEN AND EXAMINED BY ME WITH THE HOUSESTAFF AND STROKE TEAM DURING MORNING ROUNDS.   HPI:  Patient is a 64 year old male with a history of CAD s/p stents 2009 and 2019, HTN, DM, Carotid stenosis, Hypothyroidism, and ESRD on HD who presented to outside hospital with altered mental status as he was unresponsive, aphasic, and not moving any extremities. He was treated with tPA and admitted for observation.  Patient noted to have seizure during hospital course and was started on Keppra.  MRI brain was done which showed bilateral supratentorial and infratentorial infarcts.  Patient was undergoing dialysis when he developed A. fib and was intubated for airway protection.  Neurology following and multiple EEGs were done which showed patient in status epilepticus despite multiple antiepilectics.  Patient was transferred to [facility] for further management. Extubated on 12/21 and transferred to stroke unit.    SUBJECTIVE: No events overnight.  No new neurologic complaints.     acetaminophen    Suspension .. 650 milliGRAM(s) Oral every 4 hours PRN  albuterol/ipratropium for Nebulization 3 milliLiter(s) Nebulizer every 6 hours  aspirin  chewable 81 milliGRAM(s) Oral daily  atorvastatin 80 milliGRAM(s) Oral at bedtime  calcium acetate 667 milliGRAM(s) Oral three times a day with meals  carvedilol 3.125 milliGRAM(s) Oral every 12 hours  clopidogrel Tablet 75 milliGRAM(s) Oral daily  heparin  Injectable 5000 Unit(s) SubCutaneous every 12 hours  insulin lispro (HumaLOG) corrective regimen sliding scale   SubCutaneous every 6 hours  insulin NPH human recombinant 18 Unit(s) SubCutaneous every 6 hours  lacosamide IVPB 50 milliGRAM(s) IV Intermittent every 12 hours  levETIRAcetam  IVPB 250 milliGRAM(s) IV Intermittent every 12 hours  levETIRAcetam  Solution 125 milliGRAM(s) Oral daily  levothyroxine 50 MICROGram(s) Oral daily  pantoprazole   Suspension 40 milliGRAM(s) Oral two times a day  polyethylene glycol 3350 17 Gram(s) Oral daily  senna Syrup 10 milliLiter(s) Oral two times a day PRN  sodium chloride 0.9%. 1000 milliLiter(s) IV Continuous <Continuous>    PHYSICAL EXAM:   Vital Signs Last 24 Hrs  T(C): 36.8 (27 Dec 2019 20:00), Max: 37.7 (27 Dec 2019 08:00)  T(F): 98.3 (27 Dec 2019 20:00), Max: 99.8 (27 Dec 2019 08:00)  HR: 75 (28 Dec 2019 04:00) (75 - 90)  BP: 140/71 (28 Dec 2019 04:00) (115/62 - 160/76)  BP(mean): 91 (28 Dec 2019 04:00) (77 - 104)  RR: 31 (28 Dec 2019 00:00) (13 - 31)  SpO2: 95% (28 Dec 2019 04:00) (93% - 100%)    General: No acute distress  HEENT: EOM intact, visual fields full  Abdomen: Soft, nontender, nondistended   Extremities: No edema    NEUROLOGICAL EXAM:  Mental status: Awake, alert, eyes open, attentive following commands.   Cranial Nerves: No facial asymmetry, no nystagmus, moderate to severe dysarthria,  tongue midline  Motor exam: no movement in UE, minimal LE.   Sensation: Intact to light touch   Coordination/ Gait: No dysmetria, gait not tested    LABS:                        9.6    11.06 )-----------( 533      ( 27 Dec 2019 14:35 )             29.1    12-27    138  |  96  |  37<H>  ----------------------------<  113<H>  3.9   |  23  |  3.75<H>    Ca    8.5      27 Dec 2019 01:14    Hemoglobin A1C, Whole Blood: 8.4 % (12-15 @ 11:00)  Hemoglobin A1C, Whole Blood: 8.2 % (12-14 @ 14:04)  Hemoglobin A1C, Whole Blood: 8.2 % (12-14 @ 12:05)    IMAGING: Reviewed by me.     CT Head No Cont (12.24.19)  Multifocal hypoattenuation involving the bilateral frontal and parietal and right occipital lobes is consistent with previously seen acute/subacute infarcts. No CT evidence for hemorrhagic transformation.  No displaced calvarial fracture or appreciable scalp hematoma.    MRI Head No Cont (12.15.19)  Numerous acute diffuse bilateral supratentorial and   infratentorial infarcts.    CT Angio Head/Neck w/ IV Cont (12.14.19)  Mild stenosis of the proximal left internal carotid artery of   up to 30% based on NASCET criteria. No stenosis of the cervical right   carotid artery based on NASCET criteria. Patent cervical vertebral   arteries.  No significant stenosis or occlusion of the major proximal   arterial branches. THE PATIENT WAS SEEN AND EXAMINED BY ME WITH THE HOUSESTAFF AND STROKE TEAM DURING MORNING ROUNDS.   HPI:  Patient is a 64 year old male with a history of CAD s/p stents 2009 and 2019, HTN, DM, Carotid stenosis, Hypothyroidism, and ESRD on HD who presented to outside hospital with altered mental status as he was unresponsive, aphasic, and not moving any extremities. He was treated with tPA and admitted for observation.  Patient noted to have seizure during hospital course and was started on Keppra.  MRI brain was done which showed bilateral supratentorial and infratentorial infarcts.  Patient was undergoing dialysis when he developed A. fib and was intubated for airway protection.  Neurology following and multiple EEGs were done which showed patient in status epilepticus despite multiple antiepilectics.  Patient was transferred to [facility] for further management. Extubated on 12/21 and transferred to stroke unit.    SUBJECTIVE: No events overnight.  No new neurologic complaints.     acetaminophen    Suspension .. 650 milliGRAM(s) Oral every 4 hours PRN  albuterol/ipratropium for Nebulization 3 milliLiter(s) Nebulizer every 6 hours  aspirin  chewable 81 milliGRAM(s) Oral daily  atorvastatin 80 milliGRAM(s) Oral at bedtime  calcium acetate 667 milliGRAM(s) Oral three times a day with meals  carvedilol 3.125 milliGRAM(s) Oral every 12 hours  clopidogrel Tablet 75 milliGRAM(s) Oral daily  heparin  Injectable 5000 Unit(s) SubCutaneous every 12 hours  insulin lispro (HumaLOG) corrective regimen sliding scale   SubCutaneous every 6 hours  insulin NPH human recombinant 18 Unit(s) SubCutaneous every 6 hours  lacosamide IVPB 50 milliGRAM(s) IV Intermittent every 12 hours  levETIRAcetam  IVPB 250 milliGRAM(s) IV Intermittent every 12 hours  levETIRAcetam  Solution 125 milliGRAM(s) Oral daily  levothyroxine 50 MICROGram(s) Oral daily  pantoprazole   Suspension 40 milliGRAM(s) Oral two times a day  polyethylene glycol 3350 17 Gram(s) Oral daily  senna Syrup 10 milliLiter(s) Oral two times a day PRN  sodium chloride 0.9%. 1000 milliLiter(s) IV Continuous <Continuous>    PHYSICAL EXAM:   Vital Signs Last 24 Hrs  T(C): 36.8 (27 Dec 2019 20:00), Max: 37.7 (27 Dec 2019 08:00)  T(F): 98.3 (27 Dec 2019 20:00), Max: 99.8 (27 Dec 2019 08:00)  HR: 75 (28 Dec 2019 04:00) (75 - 90)  BP: 140/71 (28 Dec 2019 04:00) (115/62 - 160/76)  BP(mean): 91 (28 Dec 2019 04:00) (77 - 104)  RR: 31 (28 Dec 2019 00:00) (13 - 31)  SpO2: 95% (28 Dec 2019 04:00) (93% - 100%)    General: No acute distress  HEENT: EOM intact, visual fields full  Abdomen: Soft, nontender, nondistended   Extremities: No edema    NEUROLOGICAL EXAM:  Mental status: Awake, alert, eyes open, speaks 1-2 words, attentive following commands.   Cranial Nerves: No facial asymmetry, no nystagmus, moderate to severe dysarthria,  tongue midline  Motor exam: no movement in UE, some movement in LE.   Sensation: Intact to light touch   Coordination/ Gait: No dysmetria, gait not tested    LABS:                        9.6    11.06 )-----------( 533      ( 27 Dec 2019 14:35 )             29.1    12-27    138  |  96  |  37<H>  ----------------------------<  113<H>  3.9   |  23  |  3.75<H>    Ca    8.5      27 Dec 2019 01:14    Hemoglobin A1C, Whole Blood: 8.4 % (12-15 @ 11:00)  Hemoglobin A1C, Whole Blood: 8.2 % (12-14 @ 14:04)  Hemoglobin A1C, Whole Blood: 8.2 % (12-14 @ 12:05)    IMAGING: Reviewed by me.     CT Head No Cont (12.24.19)  Multifocal hypoattenuation involving the bilateral frontal and parietal and right occipital lobes is consistent with previously seen acute/subacute infarcts. No CT evidence for hemorrhagic transformation.  No displaced calvarial fracture or appreciable scalp hematoma.    MRI Head No Cont (12.15.19)  Numerous acute diffuse bilateral supratentorial and   infratentorial infarcts.    CT Angio Head/Neck w/ IV Cont (12.14.19)  Mild stenosis of the proximal left internal carotid artery of   up to 30% based on NASCET criteria. No stenosis of the cervical right   carotid artery based on NASCET criteria. Patent cervical vertebral   arteries.  No significant stenosis or occlusion of the major proximal   arterial branches.

## 2019-12-28 NOTE — PROGRESS NOTE ADULT - ASSESSMENT
63 yo male with ESRD on HD (M-W-FRI without AVF as awaiting renal transplant expected within 3 months; Dr. Barrientos; [facility]), CAD s/p stent 2009 and CARLA in June 2019, HTN, DM type 2, hypothyroidism and left carotid stenosis admitted for altered mental status during dialysis, he became unresponsive and arm not moving therefore he was sent to hospital. At [facility], he received tPA on 12/13/19. Was noted to have SAH after tPA. Started on 3% saline. Had seizure, despite multiple AEDs remained in status epilepticus and was transferred to [facility] for continuous EEG monitoring. s/p extubation, PEG tube placement. Is awake and following commands. Nephrology is following for ESRD management.    ESRD on HD MWF  - HD consent in the chart    - last HD 12/26/19 w/ 1U PRBC transfusion; no hypotension    - next HD today  - please dose Keppra post HD on HD days      Anemia  - Hgb below goal; unable to use epogen due to CVA  - transfuse PRN Hgb <7 or hemodynamically significant bleeding; will get 1U PRBC today  - no evidence of occult bleeding  - GI following     HTN  - well controlled  - continue coreg  - Will avoid hypotension w/ HD

## 2019-12-28 NOTE — PROGRESS NOTE ADULT - ASSESSMENT
64 M hx of CAD s/p 2009, 2019, HTN, DM2, carotid stenosis, ESRD on HD admitted as transfer on mechanical ventilation for status epilepticus in setting of new embolic CVA, found to have new AF now s/p extubation.

## 2019-12-28 NOTE — PROGRESS NOTE ADULT - ASSESSMENT
63 yo male with ESRD, HD, PAF, admitted with multiple CVA's.  He has had difficulty with pulmonary toilet and airway secretions.  He has been hemodynamically stable with clear CXR and CT scan.  Peg planned, currently in NSR.  Temps have moderated, wbc is in normal range  Sptm with enterobacter and MSSA  Suggest:  1. monitor off antibiotics  2.peg per GI  3. low threshold for antibiotics for pulmonary coverage, agree with simple observation for now.

## 2019-12-28 NOTE — PROGRESS NOTE ADULT - SUBJECTIVE AND OBJECTIVE BOX
[facility]   MD Yokasta Gary D.O. Fatima Sheikh, D.O. Ruoru Wong, PA    From 7 AM - 5 PM:  OFFICE: [phone]  Dr. Barrientos cell: [phone]  Dr. Lopes cell: [phone]  Dr. Witt cell: [phone]  WALDO Valentin cell: [phone]    From 5 PM - 7 AM: Answering Service: [phone]      RENAL FOLLOW UP NOTE  --------------------------------------------------------------------------------  HPI: Pt seen and examined at bedside.       PAST HISTORY  --------------------------------------------------------------------------------  No significant changes to PMH, PSH, FHx, SHx, unless otherwise noted    ALLERGIES & MEDICATIONS  --------------------------------------------------------------------------------  Allergies    No Known Allergies    Intolerances      Standing Inpatient Medications  albuterol/ipratropium for Nebulization 3 milliLiter(s) Nebulizer every 6 hours  aspirin  chewable 81 milliGRAM(s) Oral daily  atorvastatin 80 milliGRAM(s) Oral at bedtime  calcium acetate 667 milliGRAM(s) Oral three times a day with meals  carvedilol 3.125 milliGRAM(s) Oral every 12 hours  clopidogrel Tablet 75 milliGRAM(s) Oral daily  heparin  Injectable 5000 Unit(s) SubCutaneous every 12 hours  insulin lispro (HumaLOG) corrective regimen sliding scale   SubCutaneous every 6 hours  insulin NPH human recombinant 18 Unit(s) SubCutaneous every 6 hours  lacosamide Solution 50 milliGRAM(s) Oral two times a day  levETIRAcetam  Solution 250 milliGRAM(s) Oral two times a day  levETIRAcetam  Solution 125 milliGRAM(s) Oral daily  levothyroxine 50 MICROGram(s) Oral daily  pantoprazole   Suspension 40 milliGRAM(s) Oral two times a day  polyethylene glycol 3350 17 Gram(s) Oral daily    PRN Inpatient Medications  acetaminophen    Suspension .. 650 milliGRAM(s) Oral every 4 hours PRN  senna Syrup 10 milliLiter(s) Oral two times a day PRN      REVIEW OF SYSTEMS  --------------------------------------------------------------------------------  Unable to obtain     VITALS/PHYSICAL EXAM  --------------------------------------------------------------------------------  T(C): 36.7 (12-28-19 @ 08:00), Max: 37.7 (12-27-19 @ 17:00)  HR: 79 (12-28-19 @ 10:00) (70 - 87)  BP: 138/76 (12-28-19 @ 10:00) (115/62 - 160/76)  RR: 17 (12-28-19 @ 10:00) (13 - 31)  SpO2: 99% (12-28-19 @ 10:00) (93% - 100%)  Wt(kg): --      12-27-19 @ 07:01  -  12-28-19 @ 07:00  --------------------------------------------------------  IN: 1070 mL / OUT: 300 mL / NET: 770 mL    12-28-19 @ 07:01  -  12-28-19 @ 12:11  --------------------------------------------------------  IN: 160 mL / OUT: 0 mL / NET: 160 mL      Physical Exam:  	Gen: NAD  	HEENT: MMM  	Pulm: CTA B/L  	CV: S1S2  	Abd: Soft, +BS  	Ext: No LE edema B/L                      Neuro: Awake   	Skin: Warm and Dry   	Vascular access: + tunneled HD catheter     LABS/STUDIES  --------------------------------------------------------------------------------              9.6    9.79  >-----------<  446      [12-28-19 @ 07:18]              30.0     138  |  97  |  68  ----------------------------<  148      [12-28-19 @ 07:18]  4.5   |  17  |  6.41        Ca     8.1     [12-28-19 @ 07:18]    Creatinine Trend:  SCr 6.41 [12-28 @ 07:18]  SCr 3.75 [12-27 @ 01:14]  SCr 7.60 [12-26 @ 05:40]  SCr 4.77 [12-25 @ 00:29]  SCr 7.00 [12-24 @ 01:15]    Urinalysis - [12-24-19 @ 01:57]      Color Yellow / Appearance Clear / SG 1.017 / pH 6.5      Gluc 500 mg/dL / Ketone Negative  / Bili Negative / Urobili Negative       Blood Small / Protein 300 mg/dL / Leuk Est Negative / Nitrite Negative      RBC 3 / WBC 4 / Hyaline 1 / Gran  / Sq Epi  / Non Sq Epi 1 / Bacteria Negative      Iron 45, TIBC 145, %sat 31      [12-26-19 @ 23:24]  Ferritin 1369      [12-26-19 @ 23:24]  PTH -- (Ca 6.9)      [06-05-19 @ 08:14]   562  HbA1c 8.4      [12-15-19 @ 11:00]  TSH 0.88      [12-19-19 @ 10:33]  Lipid: chol 134, , HDL 56, LDL 52      [12-15-19 @ 10:56]    HBsAb >1000.0      [12-14-19 @ 23:48]  HBsAg Nonreact      [12-14-19 @ 23:48]  HCV 0.15, Nonreact      [12-14-19 @ 23:48]

## 2019-12-28 NOTE — PROGRESS NOTE ADULT - ASSESSMENT
64 year old male that presented to outside hospital with altered mental status and treated with tPA, now transferred to [facility] with status epilepticus. 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     NEURO: Neurologically without acute change, Continue close monitoring for neurologic deterioration, permissive HTN to gradual normotension, continue on high dose statin. Found to be in status epilepticus at OSH- continue on vimpat, Keppra (redose post HD). Carotid dopplers showed: R ICA 70-99% stenosis; L ICA 50-69% stenosis. Continue medical management. MRI Brain w/o contrast noted above. OT for splints. Physical therapy recommended OK.     ANTITHROMBOTIC THERAPY: ASA and Plavix in setting of history of CAD and in setting of carotid stenosis. AC in setting of afib to be considered pending clinical course.    PULMONARY: CXR clear on 12/23, protecting airway, saturating well. Continue aggressive suctioning. Pulmonary consulted.      CARDIOVASCULAR: TTE Left ventricular ejection fraction, by visual estimation, is 60 to 65%. Trace mitral valve regurgitation. Cardiac monitoring shows Afib at times, currently in NSR.  s/p CARLA in June 2019 c/b ST Depressions. Continue aspirin and plavix. Plan for anticoagulation pending clinical course                        SBP goal: Normotension, avoid hypotension    GASTROINTESTINAL:  dysphagia screen failed. S&S evaluation deferred further testing. Plan for EGD/PEG.      Diet: NPO    RENAL: CKD stage V awaiting transplant on HD M/W/F, s/p HD on 12/24, next HD on 12/26. Keppra dosed post HD days.      Na Goal: Greater than 135     Miranda: No    HEMATOLOGY: H/H shows anemia likely in setting of ESRD. occult negative, s/p 1 unit of PRBC on 12/26. s/p 1 unit on 12/27 prior to PEG.  LE doppler negative for DVT on 12/20.     DVT ppx: Heparin s.c    ID: afebrile, leukocytosis Sputum cultures showed numerous Staph aureus, Moderate Enterobacter. Normal Respiratory giovanni. Monitor off abx for now. CT Chest/Abd/Pelvis on 12/24 negative for source of infection. ID recommends to monitor off antibiotics.     OTHER: Plan endorsed to patient and son. All questions and concerns addressed. Medicine following.     DISPOSITION: [facility] once medically stable    CORE MEASURES:        Admission NIHSS:      TPA: YES at OSH      LDL/HDL: 52/56     Depression Screen: unable to assess     Statin Therapy: YES     Dysphagia Screen: FAIL     Smoking  NO      Afib YES     Stroke Education YES    Obtain screening lower extremity venous ultrasound in patients who meet 1 or more of the following criteria as patient is high risk for DVT/PE on admission:   [] History of DVT/PE  []Hypercoagulable states (Factor V Leiden, Cancer, OCP, etc. )  []Prolonged immobility (hemiplegia/hemiparesis/post operative or any other extended immobilization)  [] Transferred from outside facility (Rehab or Long term care)  [] Age </= to 50 64 year old male that presented to outside hospital with altered mental status and treated with tPA, now transferred to [facility] with status epilepticus. 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     NEURO: Neurologically without acute change, will repeat CTH in AM to determine timeline for anticoagulation, Continue close monitoring for neurologic deterioration, permissive HTN to gradual normotension, continue on high dose statin. Found to be in status epilepticus at OSH- continue on vimpat, Keppra (redose post HD). Carotid dopplers showed: R ICA 70-99% stenosis; L ICA 50-69% stenosis. Continue medical management. MRI Brain w/o contrast noted above. OT for splints. Physical therapy recommended OK.     ANTITHROMBOTIC THERAPY: ASA and Plavix in setting of history of CAD and in setting of carotid stenosis. AC in setting of afib to be considered pending clinical course.    PULMONARY: CXR clear on 12/23, protecting airway, saturating well. Continue aggressive suctioning. Pulmonary following.     CARDIOVASCULAR: TTE Left ventricular ejection fraction, by visual estimation, is 60 to 65%. Trace mitral valve regurgitation. Cardiac monitoring shows Afib at times, currently in NSR.  s/p CARLA in June 2019 c/b ST Depressions. Continue aspirin and plavix. Plan for anticoagulation pending clinical course                        SBP goal: Normotension, avoid hypotension    GASTROINTESTINAL:  dysphagia screen failed. S&S evaluation deferred further testing. s/p EGD on 12/28 that showed non-bleeding gastric ulcers- advised to start protonix 40 mg PO BID. PEG placed. Started on tube feedings.      Diet: NPO with TF via PEG    RENAL: CKD stage V awaiting transplant on HD M/W/F, s/p HD on 12/26, next HD on 12/28. Keppra dosed post HD days.      Na Goal: Greater than 135     Miranda: No    HEMATOLOGY: H/H shows anemia now improved likely in setting of ESRD. occult negative, s/p 1 unit of PRBC on 12/26. s/p 1 unit on 12/27 prior to PEG.  LE doppler negative for DVT on 12/20.     DVT ppx: Heparin s.c    ID: afebrile, no leukocytosis Sputum cultures showed numerous Staph aureus, Moderate Enterobacter. Normal Respiratory giovanni. Monitor off abx for now. CT Chest/Abd/Pelvis on 12/24 negative for source of infection. ID recommends to monitor off antibiotics.     OTHER: Plan endorsed to patient and son. All questions and concerns addressed. Medicine following.     DISPOSITION: [place] likely on 12/30 once bed available     CORE MEASURES:        Admission NIHSS:      TPA: YES at OSH      LDL/HDL: 52/56     Depression Screen: unable to assess     Statin Therapy: YES     Dysphagia Screen: FAIL     Smoking  NO      Afib YES     Stroke Education YES    Obtain screening lower extremity venous ultrasound in patients who meet 1 or more of the following criteria as patient is high risk for DVT/PE on admission:   [] History of DVT/PE  []Hypercoagulable states (Factor V Leiden, Cancer, OCP, etc. )  []Prolonged immobility (hemiplegia/hemiparesis/post operative or any other extended immobilization)  [] Transferred from outside facility (Rehab or Long term care)  [] Age </= to 50

## 2019-12-28 NOTE — PROGRESS NOTE ADULT - PROBLEM SELECTOR PLAN 1
Normocytic anemia likely 2/2 anemia of chronic disease in setting of ESRD. Baseline here hg likely 8-9. Nephrology following for HD.   - monitor cbc qd.

## 2019-12-28 NOTE — PROGRESS NOTE ADULT - SUBJECTIVE AND OBJECTIVE BOX
[facility]  Robert Ramirez PA-C  [address] [place] [address]  [phone]      INTERVAL HPI/OVERNIGHT EVENTS:    s/p peg yesterday    MEDICATIONS  (STANDING):  albuterol/ipratropium for Nebulization 3 milliLiter(s) Nebulizer every 6 hours  aspirin  chewable 81 milliGRAM(s) Oral daily  atorvastatin 80 milliGRAM(s) Oral at bedtime  calcium acetate 667 milliGRAM(s) Oral three times a day with meals  carvedilol 3.125 milliGRAM(s) Oral every 12 hours  clopidogrel Tablet 75 milliGRAM(s) Oral daily  heparin  Injectable 5000 Unit(s) SubCutaneous every 12 hours  insulin lispro (HumaLOG) corrective regimen sliding scale   SubCutaneous every 6 hours  insulin NPH human recombinant 18 Unit(s) SubCutaneous every 6 hours  lacosamide Solution 50 milliGRAM(s) Oral two times a day  levETIRAcetam  Solution 250 milliGRAM(s) Oral two times a day  levETIRAcetam  Solution 125 milliGRAM(s) Oral daily  levothyroxine 50 MICROGram(s) Oral daily  pantoprazole   Suspension 40 milliGRAM(s) Oral two times a day  polyethylene glycol 3350 17 Gram(s) Oral daily    MEDICATIONS  (PRN):  acetaminophen    Suspension .. 650 milliGRAM(s) Oral every 4 hours PRN Temp greater or equal to 38C (100.4F), Mild Pain (1 - 3), Moderate Pain (4 - 6), Severe Pain (7 - 10)  senna Syrup 10 milliLiter(s) Oral two times a day PRN Constipation      Allergies    No Known Allergies    Intolerances        ROS:   unable to obtain        PHYSICAL EXAM:   Vital Signs:  Vital Signs Last 24 Hrs  T(C): 36.5 (28 Dec 2019 14:00), Max: 37.7 (27 Dec 2019 17:00)  T(F): 97.7 (28 Dec 2019 14:00), Max: 99.8 (27 Dec 2019 17:00)  HR: 72 (28 Dec 2019 14:00) (70 - 87)  BP: 137/66 (28 Dec 2019 14:00) (115/62 - 157/71)  BP(mean): 114 (28 Dec 2019 14:00) (77 - 114)  RR: 17 (28 Dec 2019 14:00) (15 - 31)  SpO2: 100% (28 Dec 2019 14:00) (93% - 100%)  Daily     Daily     ill appearing  frail  non toxic  soft, nt +peg   no edema        LABS:                        9.6    9.79  )-----------( 446      ( 28 Dec 2019 07:18 )             30.0     12-28    138  |  97  |  68<H>  ----------------------------<  148<H>  4.5   |  17<L>  |  6.41<H>    Ca    8.1<L>      28 Dec 2019 07:18            RADIOLOGY & ADDITIONAL TESTS:

## 2019-12-28 NOTE — PROGRESS NOTE ADULT - ASSESSMENT
dysphagia  cva  anemia    s/p peg  feeds as tolerated  proton pump inhibitor daily  no overt gi bleeding  cbc daily  will follow

## 2019-12-28 NOTE — PROGRESS NOTE ADULT - PROBLEM SELECTOR PLAN 5
Receiving HD via [facility]. HD as per renal. Was planning for renal transplant.  - cont phoslo   - monitor bmp qd.

## 2019-12-29 LAB
ANION GAP SERPL CALC-SCNC: 17 MMOL/L — SIGNIFICANT CHANGE UP (ref 5–17)
BUN SERPL-MCNC: 43 MG/DL — HIGH (ref 7–23)
CALCIUM SERPL-MCNC: 8.6 MG/DL — SIGNIFICANT CHANGE UP (ref 8.4–10.5)
CHLORIDE SERPL-SCNC: 93 MMOL/L — LOW (ref 96–108)
CO2 SERPL-SCNC: 22 MMOL/L — SIGNIFICANT CHANGE UP (ref 22–31)
CREAT SERPL-MCNC: 4.57 MG/DL — HIGH (ref 0.5–1.3)
CULTURE RESULTS: SIGNIFICANT CHANGE UP
CULTURE RESULTS: SIGNIFICANT CHANGE UP
GLUCOSE SERPL-MCNC: 114 MG/DL — HIGH (ref 70–99)
HCT VFR BLD CALC: 32.6 % — LOW (ref 39–50)
HGB BLD-MCNC: 10.6 G/DL — LOW (ref 13–17)
MCHC RBC-ENTMCNC: 29.1 PG — SIGNIFICANT CHANGE UP (ref 27–34)
MCHC RBC-ENTMCNC: 32.5 GM/DL — SIGNIFICANT CHANGE UP (ref 32–36)
MCV RBC AUTO: 89.6 FL — SIGNIFICANT CHANGE UP (ref 80–100)
NRBC # BLD: 0 /100 WBCS — SIGNIFICANT CHANGE UP (ref 0–0)
PLATELET # BLD AUTO: 508 K/UL — HIGH (ref 150–400)
POTASSIUM SERPL-MCNC: 3.7 MMOL/L — SIGNIFICANT CHANGE UP (ref 3.5–5.3)
POTASSIUM SERPL-SCNC: 3.7 MMOL/L — SIGNIFICANT CHANGE UP (ref 3.5–5.3)
RBC # BLD: 3.64 M/UL — LOW (ref 4.2–5.8)
RBC # FLD: 15.9 % — HIGH (ref 10.3–14.5)
SODIUM SERPL-SCNC: 132 MMOL/L — LOW (ref 135–145)
SPECIMEN SOURCE: SIGNIFICANT CHANGE UP
SPECIMEN SOURCE: SIGNIFICANT CHANGE UP
WBC # BLD: 11.5 K/UL — HIGH (ref 3.8–10.5)
WBC # FLD AUTO: 11.5 K/UL — HIGH (ref 3.8–10.5)

## 2019-12-29 PROCEDURE — 71045 X-RAY EXAM CHEST 1 VIEW: CPT | Mod: 26

## 2019-12-29 PROCEDURE — 99233 SBSQ HOSP IP/OBS HIGH 50: CPT

## 2019-12-29 RX ADMIN — Medication 667 MILLIGRAM(S): at 21:47

## 2019-12-29 RX ADMIN — CLOPIDOGREL BISULFATE 75 MILLIGRAM(S): 75 TABLET, FILM COATED ORAL at 11:36

## 2019-12-29 RX ADMIN — Medication 3 MILLILITER(S): at 17:54

## 2019-12-29 RX ADMIN — Medication 2: at 00:24

## 2019-12-29 RX ADMIN — POLYETHYLENE GLYCOL 3350 17 GRAM(S): 17 POWDER, FOR SOLUTION ORAL at 11:37

## 2019-12-29 RX ADMIN — Medication 667 MILLIGRAM(S): at 09:01

## 2019-12-29 RX ADMIN — LACOSAMIDE 50 MILLIGRAM(S): 50 TABLET ORAL at 05:35

## 2019-12-29 RX ADMIN — LEVETIRACETAM 250 MILLIGRAM(S): 250 TABLET, FILM COATED ORAL at 17:57

## 2019-12-29 RX ADMIN — HUMAN INSULIN 18 UNIT(S): 100 INJECTION, SUSPENSION SUBCUTANEOUS at 05:40

## 2019-12-29 RX ADMIN — LACOSAMIDE 50 MILLIGRAM(S): 50 TABLET ORAL at 18:01

## 2019-12-29 RX ADMIN — LEVETIRACETAM 125 MILLIGRAM(S): 250 TABLET, FILM COATED ORAL at 11:36

## 2019-12-29 RX ADMIN — Medication 667 MILLIGRAM(S): at 15:45

## 2019-12-29 RX ADMIN — Medication 200 MILLIGRAM(S): at 17:54

## 2019-12-29 RX ADMIN — PANTOPRAZOLE SODIUM 40 MILLIGRAM(S): 20 TABLET, DELAYED RELEASE ORAL at 05:35

## 2019-12-29 RX ADMIN — Medication 3 MILLILITER(S): at 00:26

## 2019-12-29 RX ADMIN — Medication 50 MICROGRAM(S): at 05:35

## 2019-12-29 RX ADMIN — HUMAN INSULIN 18 UNIT(S): 100 INJECTION, SUSPENSION SUBCUTANEOUS at 11:37

## 2019-12-29 RX ADMIN — Medication 81 MILLIGRAM(S): at 11:36

## 2019-12-29 RX ADMIN — LEVETIRACETAM 250 MILLIGRAM(S): 250 TABLET, FILM COATED ORAL at 05:35

## 2019-12-29 RX ADMIN — CARVEDILOL PHOSPHATE 3.12 MILLIGRAM(S): 80 CAPSULE, EXTENDED RELEASE ORAL at 17:56

## 2019-12-29 RX ADMIN — Medication 3 MILLILITER(S): at 05:36

## 2019-12-29 RX ADMIN — Medication 3 MILLILITER(S): at 11:34

## 2019-12-29 RX ADMIN — HEPARIN SODIUM 5000 UNIT(S): 5000 INJECTION INTRAVENOUS; SUBCUTANEOUS at 17:56

## 2019-12-29 RX ADMIN — ATORVASTATIN CALCIUM 80 MILLIGRAM(S): 80 TABLET, FILM COATED ORAL at 21:47

## 2019-12-29 RX ADMIN — PANTOPRAZOLE SODIUM 40 MILLIGRAM(S): 20 TABLET, DELAYED RELEASE ORAL at 17:55

## 2019-12-29 RX ADMIN — HEPARIN SODIUM 5000 UNIT(S): 5000 INJECTION INTRAVENOUS; SUBCUTANEOUS at 05:35

## 2019-12-29 RX ADMIN — CARVEDILOL PHOSPHATE 3.12 MILLIGRAM(S): 80 CAPSULE, EXTENDED RELEASE ORAL at 05:35

## 2019-12-29 RX ADMIN — Medication 3 MILLIGRAM(S): at 21:47

## 2019-12-29 NOTE — PROGRESS NOTE ADULT - SUBJECTIVE AND OBJECTIVE BOX
Patient is a 64y old  Male who presents with a chief complaint of Seizures (29 Dec 2019 14:24)      SUBJECTIVE / OVERNIGHT EVENTS: family ar bedside,report that pt has been having increasing secretions, coughing     MEDICATIONS  (STANDING):  albuterol/ipratropium for Nebulization 3 milliLiter(s) Nebulizer every 6 hours  aspirin  chewable 81 milliGRAM(s) Oral daily  atorvastatin 80 milliGRAM(s) Oral at bedtime  calcium acetate 667 milliGRAM(s) Oral three times a day with meals  carvedilol 3.125 milliGRAM(s) Oral every 12 hours  clopidogrel Tablet 75 milliGRAM(s) Oral daily  heparin  Injectable 5000 Unit(s) SubCutaneous every 12 hours  insulin lispro (HumaLOG) corrective regimen sliding scale   SubCutaneous every 6 hours  insulin NPH human recombinant 18 Unit(s) SubCutaneous every 6 hours  lacosamide Solution 50 milliGRAM(s) Oral two times a day  levETIRAcetam  Solution 250 milliGRAM(s) Oral two times a day  levETIRAcetam  Solution 125 milliGRAM(s) Oral daily  levothyroxine 50 MICROGram(s) Oral daily  melatonin 3 milliGRAM(s) Oral at bedtime  pantoprazole   Suspension 40 milliGRAM(s) Oral two times a day  polyethylene glycol 3350 17 Gram(s) Oral daily    MEDICATIONS  (PRN):  acetaminophen    Suspension .. 650 milliGRAM(s) Oral every 4 hours PRN Temp greater or equal to 38C (100.4F), Mild Pain (1 - 3), Moderate Pain (4 - 6), Severe Pain (7 - 10)  guaiFENesin   Syrup  (Sugar-Free) 200 milliGRAM(s) Oral every 6 hours PRN Cough  senna Syrup 10 milliLiter(s) Oral two times a day PRN Constipation        CAPILLARY BLOOD GLUCOSE      POCT Blood Glucose.: 135 mg/dL (29 Dec 2019 11:31)  POCT Blood Glucose.: 114 mg/dL (29 Dec 2019 05:37)  POCT Blood Glucose.: 185 mg/dL (28 Dec 2019 23:54)  POCT Blood Glucose.: 100 mg/dL (28 Dec 2019 20:07)    I&O's Summary    28 Dec 2019 07:01  -  29 Dec 2019 07:00  --------------------------------------------------------  IN: 920 mL / OUT: 2400 mL / NET: -1480 mL        PHYSICAL EXAM:  GENERAL: NAD, well-developed  HEAD:  Atraumatic, Normocephalic  EYES: conjunctiva and sclera clear  NECK: No JVD  CHEST/LUNG: rhonchi bl  HEART: Irregular; S1S2  ABDOMEN: Soft, Nontender, Nondistended; Bowel sounds present  EXTREMITIES:  2+ Peripheral Pulses, No clubbing, cyanosis, or edema  s    LABS:                        10.6   11.50 )-----------( 508      ( 29 Dec 2019 05:08 )             32.6     12-29    132<L>  |  93<L>  |  43<H>  ----------------------------<  114<H>  3.7   |  22  |  4.57<H>    Ca    8.6      29 Dec 2019 05:08                RADIOLOGY & ADDITIONAL TESTS:    Imaging Personally Reviewed:    Consultant(s) Notes Reviewed:      Care Discussed with Consultants/Other Providers:

## 2019-12-29 NOTE — PROGRESS NOTE ADULT - SUBJECTIVE AND OBJECTIVE BOX
[facility]   MD Yokasta Gary D.O. Fatima Sheikh, D.O. Ruoru Wong, PA    From 7 AM - 5 PM:  OFFICE: [phone]  Dr. Barrientos cell: [phone]  Dr. Lopes cell: [phone]  Dr. Witt cell: [phone]  WALDO Valentin cell: [phone]    From 5 PM - 7 AM: Answering Service: [phone]      RENAL FOLLOW UP NOTE  --------------------------------------------------------------------------------  HPI: Pt seen and examined at bedside. Arousable     PAST HISTORY  --------------------------------------------------------------------------------  No significant changes to PMH, PSH, FHx, SHx, unless otherwise noted    ALLERGIES & MEDICATIONS  --------------------------------------------------------------------------------  Allergies    No Known Allergies    Intolerances      Standing Inpatient Medications  albuterol/ipratropium for Nebulization 3 milliLiter(s) Nebulizer every 6 hours  aspirin  chewable 81 milliGRAM(s) Oral daily  atorvastatin 80 milliGRAM(s) Oral at bedtime  calcium acetate 667 milliGRAM(s) Oral three times a day with meals  carvedilol 3.125 milliGRAM(s) Oral every 12 hours  clopidogrel Tablet 75 milliGRAM(s) Oral daily  heparin  Injectable 5000 Unit(s) SubCutaneous every 12 hours  insulin lispro (HumaLOG) corrective regimen sliding scale   SubCutaneous every 6 hours  insulin NPH human recombinant 18 Unit(s) SubCutaneous every 6 hours  lacosamide Solution 50 milliGRAM(s) Oral two times a day  levETIRAcetam  Solution 250 milliGRAM(s) Oral two times a day  levETIRAcetam  Solution 125 milliGRAM(s) Oral daily  levothyroxine 50 MICROGram(s) Oral daily  melatonin 3 milliGRAM(s) Oral at bedtime  pantoprazole   Suspension 40 milliGRAM(s) Oral two times a day  polyethylene glycol 3350 17 Gram(s) Oral daily    PRN Inpatient Medications  acetaminophen    Suspension .. 650 milliGRAM(s) Oral every 4 hours PRN  senna Syrup 10 milliLiter(s) Oral two times a day PRN      REVIEW OF SYSTEMS  --------------------------------------------------------------------------------  Unable to obtain     VITALS/PHYSICAL EXAM  --------------------------------------------------------------------------------  T(C): 36.7 (12-29-19 @ 08:00), Max: 37.1 (12-28-19 @ 15:00)  HR: 78 (12-29-19 @ 12:00) (73 - 86)  BP: 146/69 (12-29-19 @ 12:00) (122/68 - 177/76)  RR: 21 (12-29-19 @ 12:00) (11 - 24)  SpO2: 96% (12-29-19 @ 12:00) (96% - 100%)  Wt(kg): --        12-28-19 @ 07:01  -  12-29-19 @ 07:00  --------------------------------------------------------  IN: 920 mL / OUT: 2400 mL / NET: -1480 mL      Physical Exam:  	Gen: NAD  	HEENT: MMM  	Pulm: CTA B/L  	CV: S1S2  	Abd: Soft, +BS + PEG  	Ext: No LE edema B/L                      Neuro: Awake   	Skin: Warm and Dry   	Vascular access: + tunneled HD catheter     LABS/STUDIES  --------------------------------------------------------------------------------              10.6   11.50 >-----------<  508      [12-29-19 @ 05:08]              32.6     132  |  93  |  43  ----------------------------<  114      [12-29-19 @ 05:08]  3.7   |  22  |  4.57        Ca     8.6     [12-29-19 @ 05:08]    Creatinine Trend:  SCr 4.57 [12-29 @ 05:08]  SCr 6.41 [12-28 @ 07:18]  SCr 3.75 [12-27 @ 01:14]  SCr 7.60 [12-26 @ 05:40]  SCr 4.77 [12-25 @ 00:29]    Urinalysis - [12-24-19 @ 01:57]      Color Yellow / Appearance Clear / SG 1.017 / pH 6.5      Gluc 500 mg/dL / Ketone Negative  / Bili Negative / Urobili Negative       Blood Small / Protein 300 mg/dL / Leuk Est Negative / Nitrite Negative      RBC 3 / WBC 4 / Hyaline 1 / Gran  / Sq Epi  / Non Sq Epi 1 / Bacteria Negative      Iron 45, TIBC 145, %sat 31      [12-26-19 @ 23:24]  Ferritin 1369      [12-26-19 @ 23:24]  PTH -- (Ca 6.9)      [06-05-19 @ 08:14]   562  HbA1c 8.4      [12-15-19 @ 11:00]  TSH 0.88      [12-19-19 @ 10:33]  Lipid: chol 134, , HDL 56, LDL 52      [12-15-19 @ 10:56]    HBsAb >1000.0      [12-14-19 @ 23:48]  HBsAg Nonreact      [12-14-19 @ 23:48]  HCV 0.15, Nonreact      [12-14-19 @ 23:48]

## 2019-12-29 NOTE — PROGRESS NOTE ADULT - SUBJECTIVE AND OBJECTIVE BOX
CC: f/u for possible pneumonia    Patient reports: he is alert, responds to simple questions and follows commands    REVIEW OF SYSTEMS:  All other review of systems negative (Comprehensive ROS): limited strength    Antimicrobials Day #  :off    Other Medications Reviewed    T(F): 98.1 (12-29-19 @ 08:00), Max: 98.8 (12-28-19 @ 15:00)  HR: 78 (12-29-19 @ 08:00)  BP: 149/80 (12-29-19 @ 08:00)  RR: 20 (12-29-19 @ 08:00)  SpO2: 99% (12-29-19 @ 08:00)  Wt(kg): --    PHYSICAL EXAM:  General: alert, no acute distress.Rt chest HD catheter  Eyes:  anicteric, no conjunctival injection, no discharge  Oropharynx: no lesions or injection 	  Neck: supple, without adenopathy  Lungs: clear to auscultation  Heart: regular rate and rhythm; no murmur, rubs or gallops  Abdomen: soft, nondistended, nontender, peg  Skin: no lesions  Extremities: no clubbing, cyanosis, or edema  Neurologic: alert, oriented, limited strength of extremities    LAB RESULTS:                        10.6   11.50 )-----------( 508      ( 29 Dec 2019 05:08 )             32.6     12-29    132<L>  |  93<L>  |  43<H>  ----------------------------<  114<H>  3.7   |  22  |  4.57<H>    Ca    8.6      29 Dec 2019 05:08          MICROBIOLOGY:  RECENT CULTURES:      RADIOLOGY REVIEWED:  < from: Xray Chest 1 View- PORTABLE-Routine (12.26.19 @ 13:10) >  IMPRESSION:    Clear lungs.    < end of copied text >

## 2019-12-29 NOTE — PROGRESS NOTE ADULT - SUBJECTIVE AND OBJECTIVE BOX
[facility]  Robert Ramirez PA-C  [address] [place] [address]  [phone]      INTERVAL HPI/OVERNIGHT EVENTS:    s/p peg     MEDICATIONS  (STANDING):  albuterol/ipratropium for Nebulization 3 milliLiter(s) Nebulizer every 6 hours  aspirin  chewable 81 milliGRAM(s) Oral daily  atorvastatin 80 milliGRAM(s) Oral at bedtime  calcium acetate 667 milliGRAM(s) Oral three times a day with meals  carvedilol 3.125 milliGRAM(s) Oral every 12 hours  clopidogrel Tablet 75 milliGRAM(s) Oral daily  heparin  Injectable 5000 Unit(s) SubCutaneous every 12 hours  insulin lispro (HumaLOG) corrective regimen sliding scale   SubCutaneous every 6 hours  insulin NPH human recombinant 18 Unit(s) SubCutaneous every 6 hours  lacosamide Solution 50 milliGRAM(s) Oral two times a day  levETIRAcetam  Solution 250 milliGRAM(s) Oral two times a day  levETIRAcetam  Solution 125 milliGRAM(s) Oral daily  levothyroxine 50 MICROGram(s) Oral daily  pantoprazole   Suspension 40 milliGRAM(s) Oral two times a day  polyethylene glycol 3350 17 Gram(s) Oral daily    MEDICATIONS  (PRN):  acetaminophen    Suspension .. 650 milliGRAM(s) Oral every 4 hours PRN Temp greater or equal to 38C (100.4F), Mild Pain (1 - 3), Moderate Pain (4 - 6), Severe Pain (7 - 10)  senna Syrup 10 milliLiter(s) Oral two times a day PRN Constipation      Allergies    No Known Allergies    Intolerances        ROS:   unable to obtain        PHYSICAL EXAM:   Vital Signs:  Vital Signs Last 24 Hrs  T(C): 36.5 (28 Dec 2019 14:00), Max: 37.7 (27 Dec 2019 17:00)  T(F): 97.7 (28 Dec 2019 14:00), Max: 99.8 (27 Dec 2019 17:00)  HR: 72 (28 Dec 2019 14:00) (70 - 87)  BP: 137/66 (28 Dec 2019 14:00) (115/62 - 157/71)  BP(mean): 114 (28 Dec 2019 14:00) (77 - 114)  RR: 17 (28 Dec 2019 14:00) (15 - 31)  SpO2: 100% (28 Dec 2019 14:00) (93% - 100%)  Daily     Daily     ill appearing  frail  non toxic  soft, nt +peg   no edema        LABS:                        9.6    9.79  )-----------( 446      ( 28 Dec 2019 07:18 )             30.0     12-28    138  |  97  |  68<H>  ----------------------------<  148<H>  4.5   |  17<L>  |  6.41<H>    Ca    8.1<L>      28 Dec 2019 07:18            RADIOLOGY & ADDITIONAL TESTS:

## 2019-12-29 NOTE — PROGRESS NOTE ADULT - ASSESSMENT
64 year old male that presented to outside hospital with altered mental status and treated with tPA, now transferred to [facility] with status epilepticus. 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     NEURO: Neurologically without acute change, will repeat CTH in AM to determine timeline for anticoagulation, Continue close monitoring for neurologic deterioration, permissive HTN to gradual normotension, continue on high dose statin. Found to be in status epilepticus at OSH- continue on vimpat, Keppra (redose post HD). Carotid dopplers showed: R ICA 70-99% stenosis; L ICA 50-69% stenosis. Continue medical management. MRI Brain w/o contrast noted above. OT for splints. Physical therapy recommended OK.     ANTITHROMBOTIC THERAPY: ASA and Plavix in setting of history of CAD and in setting of carotid stenosis. AC in setting of afib to be considered pending clinical course.    PULMONARY: CXR clear on 12/23, protecting airway, saturating well. Continue aggressive suctioning. Pulmonary following.     CARDIOVASCULAR: TTE Left ventricular ejection fraction, by visual estimation, is 60 to 65%. Trace mitral valve regurgitation. Cardiac monitoring shows Afib at times, currently in NSR.  s/p CARLA in June 2019 c/b ST Depressions. Continue aspirin and plavix. Plan for anticoagulation pending clinical course                        SBP goal: Normotension, avoid hypotension    GASTROINTESTINAL:  dysphagia screen failed. S&S evaluation deferred further testing. s/p EGD on 12/28 that showed non-bleeding gastric ulcers- advised to start protonix 40 mg PO BID. PEG placed. Will decreased tube feedings to 40ML as per medicine team recommendations     Diet: NPO with TF via PEG    RENAL: CKD stage V awaiting transplant on HD M/W/F, s/p HD on 12/26, next HD on 12/28. Keppra dosed post HD days.      Na Goal: Greater than 135     Miranda: No    HEMATOLOGY: H/H shows anemia now improved likely in setting of ESRD. occult negative, s/p 1 unit of PRBC on 12/26. s/p 1 unit on 12/27 prior to PEG.  LE doppler negative for DVT on 12/20.     DVT ppx: Heparin s.c    ID: afebrile, no leukocytosis Sputum cultures showed numerous Staph aureus, Moderate Enterobacter. Normal Respiratory giovanni. Monitor off abx for now. CT Chest/Abd/Pelvis on 12/24 negative for source of infection. ID recommends to monitor off antibiotics.     OTHER: Plan endorsed to patient and son. All questions and concerns addressed. Medicine following.     DISPOSITION: OK likely on 12/30 once bed available     CORE MEASURES:        Admission NIHSS:      TPA: YES at OSH      LDL/HDL: 52/56     Depression Screen: unable to assess     Statin Therapy: YES     Dysphagia Screen: FAIL     Smoking  NO      Afib YES     Stroke Education YES    Obtain screening lower extremity venous ultrasound in patients who meet 1 or more of the following criteria as patient is high risk for DVT/PE on admission:   [] History of DVT/PE  []Hypercoagulable states (Factor V Leiden, Cancer, OCP, etc. )  []Prolonged immobility (hemiplegia/hemiparesis/post operative or any other extended immobilization)  [] Transferred from outside facility (Rehab or Long term care)  [] Age </= to 50

## 2019-12-29 NOTE — PROGRESS NOTE ADULT - ASSESSMENT
65 yo male with ESRD, HD, PAF, admitted with multiple CVA's.  He has had difficulty with pulmonary toilet and airway secretions.  He has been hemodynamically stable with clear CXR and CT scan.  S/P Peg placement  Temps have moderated, wbc is in normal range  Sptm with enterobacter and MSSA  His respiratory status has improved with conservative measures  Suggest:  1. monitor off antibiotics  2.He appears to be making favorable progress, improved without antibiotics

## 2019-12-29 NOTE — PROGRESS NOTE ADULT - SUBJECTIVE AND OBJECTIVE BOX
THE PATIENT WAS SEEN AND EXAMINED BY ME WITH THE HOUSESTAFF AND STROKE TEAM DURING MORNING ROUNDS.   HPI:  64 year old male with a history of CAD s/p stents 2009 and 2019, HTN, DM, Carotid stenosis, Hypothyroidism, and ESRD on HD who presented to outside hospital with altered mental status as he was unresponsive, aphasic, and not moving any extremities. He was treated with tPA and admitted for observation.  Patient noted to have seizure during hospital course and was started on Keppra.  MRI brain was done which showed bilateral supratentorial and infratentorial infarcts.  Patient was undergoing dialysis when he developed A. fib and was intubated for airway protection.  Neurology following and multiple EEGs were done which showed patient in status epilepticus despite multiple antiepilectics.  Patient was transferred to [facility] for further management. Extubated on 12/21 and transferred to stroke unit.      SUBJECTIVE: No events overnight.  No new neurologic complaints.      acetaminophen    Suspension .. 650 milliGRAM(s) Oral every 4 hours PRN  albuterol/ipratropium for Nebulization 3 milliLiter(s) Nebulizer every 6 hours  aspirin  chewable 81 milliGRAM(s) Oral daily  atorvastatin 80 milliGRAM(s) Oral at bedtime  calcium acetate 667 milliGRAM(s) Oral three times a day with meals  carvedilol 3.125 milliGRAM(s) Oral every 12 hours  clopidogrel Tablet 75 milliGRAM(s) Oral daily  heparin  Injectable 5000 Unit(s) SubCutaneous every 12 hours  insulin lispro (HumaLOG) corrective regimen sliding scale   SubCutaneous every 6 hours  insulin NPH human recombinant 18 Unit(s) SubCutaneous every 6 hours  lacosamide Solution 50 milliGRAM(s) Oral two times a day  levETIRAcetam  Solution 250 milliGRAM(s) Oral two times a day  levETIRAcetam  Solution 125 milliGRAM(s) Oral daily  levothyroxine 50 MICROGram(s) Oral daily  melatonin 3 milliGRAM(s) Oral at bedtime  pantoprazole   Suspension 40 milliGRAM(s) Oral two times a day  polyethylene glycol 3350 17 Gram(s) Oral daily  senna Syrup 10 milliLiter(s) Oral two times a day PRN      PHYSICAL EXAM:   Vital Signs Last 24 Hrs  T(C): 36.7 (29 Dec 2019 08:00), Max: 37.1 (28 Dec 2019 15:00)  T(F): 98.1 (29 Dec 2019 08:00), Max: 98.8 (28 Dec 2019 15:00)  HR: 78 (29 Dec 2019 12:00) (72 - 86)  BP: 146/69 (29 Dec 2019 12:00) (122/68 - 177/76)  BP(mean): 92 (29 Dec 2019 12:00) (81 - 114)  RR: 21 (29 Dec 2019 12:00) (11 - 24)  SpO2: 96% (29 Dec 2019 12:00) (96% - 100%)    General: No acute distress  HEENT: EOM intact, visual fields full  Abdomen: Soft, nontender, nondistended   Extremities: No edema    NEUROLOGICAL EXAM:  Mental status: Awake, alert, eyes open, speaks 1-2 words, attentive following commands.   Cranial Nerves: No facial asymmetry, no nystagmus, moderate to severe dysarthria,  tongue midline  Motor exam: RUE/LUE slight movement. RLE/LLE slight movement.   Sensation: Intact to light touch   Coordination/ Gait: No dysmetria, gait not tested    LABS:                        10.6   11.50 )-----------( 508      ( 29 Dec 2019 05:08 )             32.6    12-29    132<L>  |  93<L>  |  43<H>  ----------------------------<  114<H>  3.7   |  22  |  4.57<H>    Ca    8.6      29 Dec 2019 05:08      Hemoglobin A1C, Whole Blood: 8.4 % (12-15 @ 11:00)        IMAGING: Reviewed by me.     CT Head No Cont (12.24.19) Multifocal hypoattenuation involving the bilateral frontal and parietal and right occipital lobes is consistent with previously seen acute/subacute infarcts. No CT evidence for hemorrhagic transformation. No displaced calvarial fracture or appreciable scalp hematoma.    MRI Head No Cont (12.15.19) Numerous acute diffuse bilateral supratentorial and infratentorial infarcts.    CT Angio Head/Neck w/ IV Cont (12.14.19) Mild stenosis of the proximal left internal carotid artery of  up to 30% based on NASCET criteria. No stenosis of the cervical right   carotid artery based on NASCET criteria. Patent cervical vertebral arteries. No significant stenosis or occlusion of the major proximal arterial branches.

## 2019-12-29 NOTE — PROVIDER CONTACT NOTE (OTHER) - SITUATION
Hold NPH due to last dose administration time; Patient off unit to dialysis, which required patient to receive previous NPH insulin dose late. Hold 12am NPH dose and will administer next dose at 6am.

## 2019-12-29 NOTE — PROGRESS NOTE ADULT - ASSESSMENT
63 yo male with ESRD on HD (M-W-FRI without AVF as awaiting renal transplant expected within 3 months; Dr. Barrientos; [facility]), CAD s/p stent 2009 and CARLA in June 2019, HTN, DM type 2, hypothyroidism and left carotid stenosis admitted for altered mental status during dialysis, he became unresponsive and arm not moving therefore he was sent to hospital. At [facility], he received tPA on 12/13/19. Was noted to have SAH after tPA. Started on 3% saline. Had seizure, despite multiple AEDs remained in status epilepticus and was transferred to [facility] for continuous EEG monitoring. s/p extubation, PEG tube placement. Is awake and following commands. Nephrology is following for ESRD management.    ESRD on HD MWF  - HD consent in the chart    - Last HD Saturday  - Due to holiday Schedule will plan for next HD tmrw    - please dose Keppra post HD on HD days      Anemia  - Hgb below goal; unable to use epogen due to CVA  - transfuse PRN Hgb <7 or hemodynamically significant bleeding  - no evidence of occult bleeding  - GI following     HTN  - well controlled  - continue coreg  - Will avoid hypotension w/ HD

## 2019-12-30 LAB
ANION GAP SERPL CALC-SCNC: 20 MMOL/L — HIGH (ref 5–17)
BUN SERPL-MCNC: 60 MG/DL — HIGH (ref 7–23)
CALCIUM SERPL-MCNC: 9 MG/DL — SIGNIFICANT CHANGE UP (ref 8.4–10.5)
CHLORIDE SERPL-SCNC: 90 MMOL/L — LOW (ref 96–108)
CO2 SERPL-SCNC: 22 MMOL/L — SIGNIFICANT CHANGE UP (ref 22–31)
CREAT SERPL-MCNC: 6.26 MG/DL — HIGH (ref 0.5–1.3)
GLUCOSE SERPL-MCNC: 127 MG/DL — HIGH (ref 70–99)
HCT VFR BLD CALC: 29 % — LOW (ref 39–50)
HGB BLD-MCNC: 9.1 G/DL — LOW (ref 13–17)
MCHC RBC-ENTMCNC: 28.4 PG — SIGNIFICANT CHANGE UP (ref 27–34)
MCHC RBC-ENTMCNC: 31.4 GM/DL — LOW (ref 32–36)
MCV RBC AUTO: 90.6 FL — SIGNIFICANT CHANGE UP (ref 80–100)
NRBC # BLD: 0 /100 WBCS — SIGNIFICANT CHANGE UP (ref 0–0)
PHOSPHATE SERPL-MCNC: 5.6 MG/DL — HIGH (ref 2.5–4.5)
PLATELET # BLD AUTO: 548 K/UL — HIGH (ref 150–400)
POTASSIUM SERPL-MCNC: 3.8 MMOL/L — SIGNIFICANT CHANGE UP (ref 3.5–5.3)
POTASSIUM SERPL-SCNC: 3.8 MMOL/L — SIGNIFICANT CHANGE UP (ref 3.5–5.3)
RBC # BLD: 3.2 M/UL — LOW (ref 4.2–5.8)
RBC # FLD: 15.6 % — HIGH (ref 10.3–14.5)
SODIUM SERPL-SCNC: 132 MMOL/L — LOW (ref 135–145)
WBC # BLD: 11.51 K/UL — HIGH (ref 3.8–10.5)
WBC # FLD AUTO: 11.51 K/UL — HIGH (ref 3.8–10.5)

## 2019-12-30 PROCEDURE — 99233 SBSQ HOSP IP/OBS HIGH 50: CPT

## 2019-12-30 PROCEDURE — 70450 CT HEAD/BRAIN W/O DYE: CPT | Mod: 26

## 2019-12-30 RX ORDER — DEXTROSE 50 % IN WATER 50 %
12.5 SYRINGE (ML) INTRAVENOUS ONCE
Refills: 0 | Status: DISCONTINUED | OUTPATIENT
Start: 2019-12-30 | End: 2020-01-14

## 2019-12-30 RX ORDER — SODIUM CHLORIDE 9 MG/ML
1000 INJECTION, SOLUTION INTRAVENOUS
Refills: 0 | Status: DISCONTINUED | OUTPATIENT
Start: 2019-12-30 | End: 2020-01-14

## 2019-12-30 RX ORDER — GLUCAGON INJECTION, SOLUTION 0.5 MG/.1ML
1 INJECTION, SOLUTION SUBCUTANEOUS ONCE
Refills: 0 | Status: DISCONTINUED | OUTPATIENT
Start: 2019-12-30 | End: 2020-01-14

## 2019-12-30 RX ORDER — DEXTROSE 50 % IN WATER 50 %
25 SYRINGE (ML) INTRAVENOUS ONCE
Refills: 0 | Status: DISCONTINUED | OUTPATIENT
Start: 2019-12-30 | End: 2020-01-05

## 2019-12-30 RX ORDER — DEXTROSE 50 % IN WATER 50 %
25 SYRINGE (ML) INTRAVENOUS ONCE
Refills: 0 | Status: DISCONTINUED | OUTPATIENT
Start: 2019-12-30 | End: 2020-01-14

## 2019-12-30 RX ORDER — DEXTROSE 50 % IN WATER 50 %
15 SYRINGE (ML) INTRAVENOUS ONCE
Refills: 0 | Status: DISCONTINUED | OUTPATIENT
Start: 2019-12-30 | End: 2020-01-05

## 2019-12-30 RX ADMIN — LACOSAMIDE 50 MILLIGRAM(S): 50 TABLET ORAL at 05:23

## 2019-12-30 RX ADMIN — HEPARIN SODIUM 5000 UNIT(S): 5000 INJECTION INTRAVENOUS; SUBCUTANEOUS at 05:22

## 2019-12-30 RX ADMIN — LEVETIRACETAM 125 MILLIGRAM(S): 250 TABLET, FILM COATED ORAL at 18:14

## 2019-12-30 RX ADMIN — Medication 50 MICROGRAM(S): at 05:24

## 2019-12-30 RX ADMIN — PANTOPRAZOLE SODIUM 40 MILLIGRAM(S): 20 TABLET, DELAYED RELEASE ORAL at 18:14

## 2019-12-30 RX ADMIN — Medication 3 MILLILITER(S): at 18:16

## 2019-12-30 RX ADMIN — ATORVASTATIN CALCIUM 80 MILLIGRAM(S): 80 TABLET, FILM COATED ORAL at 21:20

## 2019-12-30 RX ADMIN — CLOPIDOGREL BISULFATE 75 MILLIGRAM(S): 75 TABLET, FILM COATED ORAL at 18:16

## 2019-12-30 RX ADMIN — HEPARIN SODIUM 5000 UNIT(S): 5000 INJECTION INTRAVENOUS; SUBCUTANEOUS at 18:16

## 2019-12-30 RX ADMIN — LEVETIRACETAM 250 MILLIGRAM(S): 250 TABLET, FILM COATED ORAL at 05:23

## 2019-12-30 RX ADMIN — Medication 667 MILLIGRAM(S): at 05:22

## 2019-12-30 RX ADMIN — Medication 3 MILLILITER(S): at 00:00

## 2019-12-30 RX ADMIN — Medication 3 MILLIGRAM(S): at 21:19

## 2019-12-30 RX ADMIN — PANTOPRAZOLE SODIUM 40 MILLIGRAM(S): 20 TABLET, DELAYED RELEASE ORAL at 05:23

## 2019-12-30 RX ADMIN — CARVEDILOL PHOSPHATE 3.12 MILLIGRAM(S): 80 CAPSULE, EXTENDED RELEASE ORAL at 05:22

## 2019-12-30 RX ADMIN — Medication 667 MILLIGRAM(S): at 21:20

## 2019-12-30 RX ADMIN — LEVETIRACETAM 250 MILLIGRAM(S): 250 TABLET, FILM COATED ORAL at 18:14

## 2019-12-30 RX ADMIN — LACOSAMIDE 50 MILLIGRAM(S): 50 TABLET ORAL at 18:21

## 2019-12-30 RX ADMIN — HUMAN INSULIN 18 UNIT(S): 100 INJECTION, SUSPENSION SUBCUTANEOUS at 23:30

## 2019-12-30 RX ADMIN — Medication 3 MILLILITER(S): at 21:20

## 2019-12-30 RX ADMIN — CARVEDILOL PHOSPHATE 3.12 MILLIGRAM(S): 80 CAPSULE, EXTENDED RELEASE ORAL at 18:15

## 2019-12-30 RX ADMIN — Medication 2: at 12:57

## 2019-12-30 RX ADMIN — Medication 3 MILLILITER(S): at 05:22

## 2019-12-30 RX ADMIN — Medication 81 MILLIGRAM(S): at 18:16

## 2019-12-30 RX ADMIN — Medication 200 MILLIGRAM(S): at 18:15

## 2019-12-30 NOTE — PROGRESS NOTE ADULT - SUBJECTIVE AND OBJECTIVE BOX
[facility]   MD Yokasta Gary D.O. Fatima Sheikh, D.O. Ruoru Wong, PA    From 7 AM - 5 PM:  OFFICE: [phone]  Dr. Barrientos cell: [phone]  Dr. Lopes cell: [phone]  Dr. Witt cell: [phone]  WALDO Valentin cell: [phone]    From 5 PM - 7 AM: Answering Service: [phone]        RENAL FOLLOW UP NOTE  --------------------------------------------------------------------------------  HPI: Pt seen and examined during HD. Has no complaints today. Denies any cough, fever, chills, N/V, SOB.        PAST HISTORY  --------------------------------------------------------------------------------  No significant changes to PMH, PSH, FHx, SHx, unless otherwise noted    ALLERGIES & MEDICATIONS  --------------------------------------------------------------------------------  Allergies    No Known Allergies    Intolerances      Standing Inpatient Medications  albuterol/ipratropium for Nebulization 3 milliLiter(s) Nebulizer every 6 hours  aspirin  chewable 81 milliGRAM(s) Oral daily  atorvastatin 80 milliGRAM(s) Oral at bedtime  calcium acetate 667 milliGRAM(s) Oral three times a day with meals  carvedilol 3.125 milliGRAM(s) Oral every 12 hours  clopidogrel Tablet 75 milliGRAM(s) Oral daily  dextrose 5%. 1000 milliLiter(s) IV Continuous <Continuous>  dextrose 50% Injectable 12.5 Gram(s) IV Push once  dextrose 50% Injectable 25 Gram(s) IV Push once  dextrose 50% Injectable 25 Gram(s) IV Push once  heparin  Injectable 5000 Unit(s) SubCutaneous every 12 hours  insulin lispro (HumaLOG) corrective regimen sliding scale   SubCutaneous every 6 hours  insulin NPH human recombinant 18 Unit(s) SubCutaneous every 6 hours  lacosamide Solution 50 milliGRAM(s) Oral two times a day  levETIRAcetam  Solution 250 milliGRAM(s) Oral two times a day  levETIRAcetam  Solution 125 milliGRAM(s) Oral daily  levothyroxine 50 MICROGram(s) Oral daily  melatonin 3 milliGRAM(s) Oral at bedtime  pantoprazole   Suspension 40 milliGRAM(s) Oral two times a day  polyethylene glycol 3350 17 Gram(s) Oral daily    PRN Inpatient Medications  acetaminophen    Suspension .. 650 milliGRAM(s) Oral every 4 hours PRN  dextrose 40% Gel 15 Gram(s) Oral once PRN  glucagon  Injectable 1 milliGRAM(s) IntraMuscular once PRN  guaiFENesin   Syrup  (Sugar-Free) 200 milliGRAM(s) Oral every 6 hours PRN  senna Syrup 10 milliLiter(s) Oral two times a day PRN      REVIEW OF SYSTEMS  --------------------------------------------------------------------------------  General: no fever  CVS: no chest pain  RESP: no sob, no cough  ABD: no abdominal pain  : no dysuria,  MSK: no edema     VITALS/PHYSICAL EXAM  --------------------------------------------------------------------------------  T(C): 36.8 (12-30-19 @ 17:20), Max: 36.8 (12-29-19 @ 20:00)  HR: 83 (12-30-19 @ 18:06) (73 - 83)  BP: 158/81 (12-30-19 @ 18:06) (119/62 - 161/75)  RR: 27 (12-30-19 @ 18:06) (11 - 27)  SpO2: 97% (12-30-19 @ 18:06) (95% - 100%)  Wt(kg): --        12-29-19 @ 07:01  -  12-30-19 @ 07:00  --------------------------------------------------------  IN: 650 mL / OUT: 700 mL / NET: -50 mL    12-30-19 @ 07:01  -  12-30-19 @ 18:41  --------------------------------------------------------  IN: 900 mL / OUT: 2400 mL / NET: -1500 mL      Physical Exam:  	Gen: NAD  	HEENT: MMM  	Pulm: CTA B/L  	CV: S1S2  	Abd: Soft, +BS  	Ext: No LE edema B/L              Neuro: Awake, following commands, coherent speech  	Skin: Warm and Dry   	Vascular access: [facility] in place, currently accessed for HD    LABS/STUDIES  --------------------------------------------------------------------------------              9.1    11.51 >-----------<  548      [12-30-19 @ 04:27]              29.0     132  |  90  |  60  ----------------------------<  127      [12-30-19 @ 04:27]  3.8   |  22  |  6.26        Ca     9.0     [12-30-19 @ 04:27]      Phos  5.6     [12-30-19 @ 04:27]            Creatinine Trend:  SCr 6.26 [12-30 @ 04:27]  SCr 4.57 [12-29 @ 05:08]  SCr 6.41 [12-28 @ 07:18]  SCr 3.75 [12-27 @ 01:14]  SCr 7.60 [12-26 @ 05:40]    Urinalysis - [12-24-19 @ 01:57]      Color Yellow / Appearance Clear / SG 1.017 / pH 6.5      Gluc 500 mg/dL / Ketone Negative  / Bili Negative / Urobili Negative       Blood Small / Protein 300 mg/dL / Leuk Est Negative / Nitrite Negative      RBC 3 / WBC 4 / Hyaline 1 / Gran  / Sq Epi  / Non Sq Epi 1 / Bacteria Negative      Iron 45, TIBC 145, %sat 31      [12-26-19 @ 23:24]  Ferritin 1369      [12-26-19 @ 23:24]  PTH -- (Ca 6.9)      [06-05-19 @ 08:14]   562  HbA1c 8.4      [12-15-19 @ 11:00]  TSH 0.88      [12-19-19 @ 10:33]  Lipid: chol 134, , HDL 56, LDL 52      [12-15-19 @ 10:56]    HBsAb >1000.0      [12-14-19 @ 23:48]  HBsAg Nonreact      [12-14-19 @ 23:48]  HCV 0.15, Nonreact      [12-14-19 @ 23:48]

## 2019-12-30 NOTE — PROVIDER CONTACT NOTE (OTHER) - BACKGROUND
Pt is a 64 year old male with PMH of diabetes, hypertension, currently here for stroke. pt has ESRD anemia, afib cva.

## 2019-12-30 NOTE — PROGRESS NOTE ADULT - ASSESSMENT
64 year old male that presented to outside hospital with altered mental status and treated with tPA, now transferred to [facility] with status epilepticus. 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     NEURO: Neurologically without acute change, will repeat CTH in AM to determine timeline for anticoagulation, Continue close monitoring for neurologic deterioration, permissive HTN to gradual normotension, continue on high dose statin. Found to be in status epilepticus at OSH-  on vimpat, Keppra (redose post HD). Carotid dopplers showed: R ICA 70-99% stenosis; L ICA 50-69% stenosis. Continue medical management. MRI Brain w/o contrast noted above. OT for splints. Physical therapy recommended OK.     ANTITHROMBOTIC THERAPY: ASA and Plavix in setting of history of CAD and in setting of carotid stenosis. AC in setting of afib to be considered pending clinical course and clinical/radiological stability . Antiplatelet duration if benefit > risks per cardio.     PULMONARY: CXR clear on 12/23, protecting airway, saturating well. Continue aggressive suctioning. Pulmonary following.     CARDIOVASCULAR: TTE Left ventricular ejection fraction, by visual estimation, is 60 to 65%. Trace mitral valve regurgitation. Cardiac monitoring shows Afib at times, currently in NSR.  s/p CARLA in June 2019 c/b ST Depressions. Continue aspirin and plavix. Plan for anticoagulation pending clinical course                        SBP goal: Normotension, avoid hypotension    GASTROINTESTINAL:  dysphagia screen failed. S&S evaluation deferred further testing. s/p EGD on 12/28 that showed non-bleeding gastric ulcers- on protonix 40 mg PO BID. PEG placed. TF as recommended.      Diet: NPO with TF via PEG    RENAL: CKD stage V awaiting transplant on HD M/W/F, s/p HD on 12/26, next HD on 12/28. Keppra dosed post HD days.      Na Goal: Greater than 135     Miranda: No    HEMATOLOGY: H/H shows anemia now improved likely in setting of ESRD. FOBT negative, s/p 1 unit of PRBC on 12/26. s/p 1 unit on 12/27 prior to PEG.  LE doppler negative for DVT on 12/20.     DVT ppx: Heparin s.c    ID: afebrile, no leukocytosis Sputum cultures showed numerous Staph aureus, Moderate Enterobacter. Normal Respiratory giovanni. Monitor off abx for now. CT Chest/Abd/Pelvis on 12/24 negative for source of infection. ID recommends to monitor off antibiotics.     OTHER: Plan endorsed to patient and son. All questions and concerns addressed. Medicine following.     DISPOSITION: OK likely on 12/30 once bed available     CORE MEASURES:        Admission NIHSS:      TPA: YES at OSH      LDL/HDL: 52/56     Depression Screen: unable to assess     Statin Therapy: YES     Dysphagia Screen: FAIL     Smoking  NO      Afib YES     Stroke Education YES    Obtain screening lower extremity venous ultrasound in patients who meet 1 or more of the following criteria as patient is high risk for DVT/PE on admission:   [] History of DVT/PE  []Hypercoagulable states (Factor V Leiden, Cancer, OCP, etc. )  []Prolonged immobility (hemiplegia/hemiparesis/post operative or any other extended immobilization)  [] Transferred from outside facility (Rehab or Long term care)  [] Age </= to 50

## 2019-12-30 NOTE — PROGRESS NOTE ADULT - SUBJECTIVE AND OBJECTIVE BOX
INTERVAL HPI/OVERNIGHT EVENTS:    pt seen and examined  family at bedside  pt is tolerating peg feeds, denies abdominal pain, n/v     MEDICATIONS  (STANDING):  albuterol/ipratropium for Nebulization 3 milliLiter(s) Nebulizer every 6 hours  aspirin  chewable 81 milliGRAM(s) Oral daily  atorvastatin 80 milliGRAM(s) Oral at bedtime  calcium acetate 667 milliGRAM(s) Oral three times a day with meals  carvedilol 3.125 milliGRAM(s) Oral every 12 hours  clopidogrel Tablet 75 milliGRAM(s) Oral daily  heparin  Injectable 5000 Unit(s) SubCutaneous every 12 hours  insulin lispro (HumaLOG) corrective regimen sliding scale   SubCutaneous every 6 hours  insulin NPH human recombinant 18 Unit(s) SubCutaneous every 6 hours  lacosamide Solution 50 milliGRAM(s) Oral two times a day  levETIRAcetam  Solution 250 milliGRAM(s) Oral two times a day  levETIRAcetam  Solution 125 milliGRAM(s) Oral daily  levothyroxine 50 MICROGram(s) Oral daily  melatonin 3 milliGRAM(s) Oral at bedtime  pantoprazole   Suspension 40 milliGRAM(s) Oral two times a day  polyethylene glycol 3350 17 Gram(s) Oral daily    MEDICATIONS  (PRN):  acetaminophen    Suspension .. 650 milliGRAM(s) Oral every 4 hours PRN Temp greater or equal to 38C (100.4F), Mild Pain (1 - 3), Moderate Pain (4 - 6), Severe Pain (7 - 10)  guaiFENesin   Syrup  (Sugar-Free) 200 milliGRAM(s) Oral every 6 hours PRN Cough  senna Syrup 10 milliLiter(s) Oral two times a day PRN Constipation      Allergies    No Known Allergies    Intolerances        Review of Systems:    General:  No wt loss, fevers, chills, night sweats,fatigue,   Eyes:  Good vision, no reported pain  ENT:  No sore throat, pain, runny nose, dysphagia  CV:  No pain, palpitations, hypo/hypertension  Resp:  No dyspnea, cough, tachypnea, wheezing  GI:  No pain, No nausea, No vomiting, No diarrhea, No constipation, No weight loss, No fever, No pruritis, No rectal bleeding, No melena, No dysphagia  :  No pain, bleeding, incontinence, nocturia  Muscle:  No pain, weakness  Neuro:  No weakness, tingling, memory problems  Psych:  No fatigue, insomnia, mood problems, depression  Endocrine:  No polyuria, polydypsia, cold/heat intolerance  Heme:  No petechiae, ecchymosis, easy bruisability  Skin:  No rash, tattoos, scars, edema      Vital Signs Last 24 Hrs  T(C): 36.6 (30 Dec 2019 06:52), Max: 36.8 (29 Dec 2019 20:00)  T(F): 97.9 (30 Dec 2019 06:52), Max: 98.3 (29 Dec 2019 20:00)  HR: 75 (30 Dec 2019 08:00) (70 - 78)  BP: 152/73 (30 Dec 2019 08:00) (122/54 - 161/75)  BP(mean): 96 (30 Dec 2019 08:00) (73 - 104)  RR: 11 (30 Dec 2019 08:00) (11 - 23)  SpO2: 100% (30 Dec 2019 08:00) (95% - 100%)    PHYSICAL EXAM:    ill appearing  frail  non toxic  soft, nt +peg   no edema        LABS:                        9.1    11.51 )-----------( 548      ( 30 Dec 2019 04:27 )             29.0     12-30    132<L>  |  90<L>  |  60<H>  ----------------------------<  127<H>  3.8   |  22  |  6.26<H>    Ca    9.0      30 Dec 2019 04:27  Phos  5.6     12-30            RADIOLOGY & ADDITIONAL TESTS:

## 2019-12-30 NOTE — PROGRESS NOTE ADULT - SUBJECTIVE AND OBJECTIVE BOX
Asked to comment on Anticoagulation    Overnight Events:   No overnight events.     REVIEW OF SYSTEMS:  Constitutional:     [ ] negative [ ] fevers [ ] chills [ ] weight loss [ ] weight gain  HEENT:                  [ ] negative [ ] dry eyes [ ] eye irritation [ ] postnasal drip [ ] nasal congestion  CV:                         [ ] negative  [ ] chest pain [ ] orthopnea [ ] palpitations [ ] murmur  Resp:                     [ ] negative [ ] cough [ ] shortness of breath [ ] dyspnea [ ] wheezing [ ] sputum [ ]hemoptysis  GI:                          [ ] negative [ ] nausea [ ] vomiting [ ] diarrhea [ ] constipation [ ] abd pain [ ] dysphagia   :                        [ ] negative [ ] dysuria [ ] nocturia [ ] hematuria [ ] increased urinary frequency  Musculoskeletal: [ ] negative [ ] back pain [ ] myalgias [ ] arthralgias [ ] fracture  Skin:                       [ ] negative [ ] rash [ ] itch  Neurological:        [ ] negative [ ] headache [ ] dizziness [ ] syncope [ ] weakness [ ] numbness  Psychiatric:           [ ] negative [ ] anxiety [ ] depression  Endocrine:            [ ] negative [ ] diabetes [ ] thyroid problem  Heme/Lymph:      [ ] negative [ ] anemia [ ] bleeding problem  Allergic/Immune: [ ] negative [ ] itchy eyes [ ] nasal discharge [ ] hives [ ] angioedema    [ ] All other systems negative  [x difficult to assess ROS due to    Current Meds:  acetaminophen    Suspension .. 650 milliGRAM(s) Oral every 6 hours PRN  albuterol/ipratropium for Nebulization 3 milliLiter(s) Nebulizer every 6 hours  aspirin  chewable 81 milliGRAM(s) Oral daily  atorvastatin 80 milliGRAM(s) Oral at bedtime  calcium acetate 667 milliGRAM(s) Oral three times a day with meals  carvedilol 3.125 milliGRAM(s) Oral every 12 hours  clopidogrel Tablet 75 milliGRAM(s) Oral <User Schedule>  heparin  Injectable 5000 Unit(s) SubCutaneous every 12 hours  insulin lispro (HumaLOG) corrective regimen sliding scale   SubCutaneous every 6 hours  insulin NPH human recombinant 18 Unit(s) SubCutaneous every 6 hours  lacosamide IVPB 50 milliGRAM(s) IV Intermittent every 12 hours  levETIRAcetam  IVPB 250 milliGRAM(s) IV Intermittent every 12 hours  levETIRAcetam  Solution 125 milliGRAM(s) Oral daily  levothyroxine 50 MICROGram(s) Oral daily  polyethylene glycol 3350 17 Gram(s) Oral daily  senna 2 Tablet(s) Oral at bedtime  sodium chloride 0.9%. 1000 milliLiter(s) IV Continuous <Continuous>      PAST MEDICAL & SURGICAL HISTORY:  Hypothyroidism  CRI (Chronic Renal Insufficiency)  CAD (Coronary Artery Disease): with Stents in 06/2009 and 6/2019  Dyslipidemia  Diabetes  Hypertension  History of insertion of stent into coronary artery bypass graft: 2009 and 2019      Vitals:  T(F): 98.7 (12-27), Max: 99.8 (12-27)  HR: 84 (12-27) (84 - 96)  BP: 139/72 (12-27) (109/60 - 160/72)  RR: 19 (12-27)  SpO2: 96% (12-27)  I&O's Summary    26 Dec 2019 07:01  -  27 Dec 2019 07:00  --------------------------------------------------------  IN: 2405 mL / OUT: 2200 mL / NET: 205 mL    27 Dec 2019 07:01  -  27 Dec 2019 11:08  --------------------------------------------------------  IN: 450 mL / OUT: 0 mL / NET: 450 mL        Physical Exam:  Appearance: No acute distress; well appearing  Eyes: PERRL, EOMI, pink conjunctiva  HENT: NG tube in right nare, left nare with nasal packing  Cardiovascular: RRR, S1, S2, no murmurs, rubs, or gallops; no edema; no JVD  Respiratory: Clear to auscultation bilaterally  Gastrointestinal: soft, non-tender, non-distended with normal bowel sounds  Musculoskeletal: No clubbing; no joint deformity   Neurologic: Non-focal  Lymphatic: No lymphadenopathy  Psychiatry: AAOx3, mood & affect appropriate  Skin: No rashes, ecchymoses, or cyanosis    Labs Personally Reviewed 12/30/2019                          7.8    14.17 )-----------( 572      ( 27 Dec 2019 01:14 )             24.0     12-27    138  |  96  |  37<H>  ----------------------------<  113<H>  3.9   |  23  |  3.75<H>    Ca    8.5      27 Dec 2019 01:14                    New ECG(s): Personally reviewed    Echo:  < from: TTE Echo Doppler w/o Cont (12.15.19 @ 08:32) >   1. Left ventricular ejection fraction, by visual estimation, is 60 to   65%.   2. Normal global left ventricular systolic function.   3. Mild mitralannular calcification.   4. Trace mitral valve regurgitation.    < end of copied text >    Stress Testing:     Cath:    Imaging:    Interpretation of Telemetry:  sinus rhythm

## 2019-12-30 NOTE — PROGRESS NOTE ADULT - ASSESSMENT
64 M hx of CAD s/p 2009, 2019, HTN, DM, carotid stenosis, ESRD on HD p/w AMS found to have new afib and embolic CVA.    #afib  -paroxsymal, currently NSR  -suspected etiology for embolic CVA  -CHADsVAsc > 4, currently not on AC         - If able to re-start, would start coumadin or eliquis (only if ok by renal); otherwise coumadin/plavix         - If not able to re-start from neuro standpoint, continue ASA/plavix      #CAD  -currently on ASA/Plavix  - c/w atorvastain 80mg daily  -depending on bleeding risk of CVA, will consider single antiplatelet therapy w/ AC as above when able          All Cardiology service information can be found 24/7 on [email or web address], password: [email or web address]

## 2019-12-30 NOTE — PROGRESS NOTE ADULT - ASSESSMENT
63 yo male with ESRD on HD (M-W-FRI without AVF as awaiting renal transplant expected within 3 months; Dr. Barrientos; [facility]), CAD s/p stent 2009 and CARLA in June 2019, HTN, DM type 2, hypothyroidism and left carotid stenosis admitted for altered mental status during dialysis, he became unresponsive and arm not moving therefore he was sent to hospital. At [facility], he received tPA on 12/13/19. Was noted to have SAH after tPA. Started on 3% saline. Had seizure, despite multiple AEDs remained in status epilepticus and was transferred to [facility] for continuous EEG monitoring. s/p extubation, PEG tube placement. Is awake and following commands. Nephrology is following for ESRD management.    ESRD on HD MWF  - HD consent in the chart    - Last HD Saturday  - continue HD at present; tolerating well; VSS    - please dose Keppra post HD on HD days      Anemia  - Hgb below goal; unable to use epogen due to CVA  - transfuse PRN Hgb <7 or hemodynamically significant bleeding  - no evidence of occult bleeding  - GI following     HTN  - well controlled  - continue coreg  - Will avoid hypotension w/ HD; SBP >130 thus far during HD today

## 2019-12-30 NOTE — PROGRESS NOTE ADULT - SUBJECTIVE AND OBJECTIVE BOX
THE PATIENT WAS SEEN AND EXAMINED BY ME WITH THE HOUSESTAFF AND STROKE TEAM DURING MORNING ROUNDS.   HPI:  64 year old male with a history of CAD s/p stents 2009 and 2019, HTN, DM, Carotid stenosis, Hypothyroidism, and ESRD on HD who presented to outside hospital with altered mental status as he was unresponsive, aphasic, and not moving any extremities. He was treated with tPA and admitted for observation.  Patient noted to have seizure during hospital course and was started on Keppra.  MRI brain was done which showed bilateral supratentorial and infratentorial infarcts.  Patient was undergoing dialysis when he developed A. fib and was intubated for airway protection.  Neurology following and multiple EEGs were done which showed patient in status epilepticus despite multiple antiepilectics.  Patient was transferred to [facility] for further management. Extubated on 12/21 and transferred to stroke unit.    SUBJECTIVE: No events overnight.  No new neurologic complaints.      acetaminophen    Suspension .. 650 milliGRAM(s) Oral every 4 hours PRN  albuterol/ipratropium for Nebulization 3 milliLiter(s) Nebulizer every 6 hours  aspirin  chewable 81 milliGRAM(s) Oral daily  atorvastatin 80 milliGRAM(s) Oral at bedtime  calcium acetate 667 milliGRAM(s) Oral three times a day with meals  carvedilol 3.125 milliGRAM(s) Oral every 12 hours  clopidogrel Tablet 75 milliGRAM(s) Oral daily  guaiFENesin   Syrup  (Sugar-Free) 200 milliGRAM(s) Oral every 6 hours PRN  heparin  Injectable 5000 Unit(s) SubCutaneous every 12 hours  insulin lispro (HumaLOG) corrective regimen sliding scale   SubCutaneous every 6 hours  insulin NPH human recombinant 18 Unit(s) SubCutaneous every 6 hours  lacosamide Solution 50 milliGRAM(s) Oral two times a day  levETIRAcetam  Solution 250 milliGRAM(s) Oral two times a day  levETIRAcetam  Solution 125 milliGRAM(s) Oral daily  levothyroxine 50 MICROGram(s) Oral daily  melatonin 3 milliGRAM(s) Oral at bedtime  pantoprazole   Suspension 40 milliGRAM(s) Oral two times a day  polyethylene glycol 3350 17 Gram(s) Oral daily  senna Syrup 10 milliLiter(s) Oral two times a day PRN      PHYSICAL EXAM:   Vital Signs Last 24 Hrs  T(C): 36.6 (30 Dec 2019 06:52), Max: 36.8 (29 Dec 2019 20:00)  T(F): 97.9 (30 Dec 2019 06:52), Max: 98.3 (29 Dec 2019 20:00)  HR: 75 (30 Dec 2019 08:00) (70 - 78)  BP: 152/73 (30 Dec 2019 08:00) (122/54 - 161/75)  BP(mean): 96 (30 Dec 2019 08:00) (73 - 104)  RR: 11 (30 Dec 2019 08:00) (11 - 23)  SpO2: 100% (30 Dec 2019 08:00) (95% - 100%)    General: No acute distress  HEENT: EOM intact, visual fields full  Abdomen: Soft, nontender, nondistended   Extremities: No edema    NEUROLOGICAL EXAM:  Mental status: Awake, alert, eyes open, speaks 1-2 words, attentive following commands.   Cranial Nerves: No facial asymmetry, no nystagmus, moderate to severe dysarthria,  tongue midline  Motor exam: RUE/LUE slight movement. RLE/LLE slight movement.   Sensation: Intact to light touch   Coordination/ Gait: No dysmetria, gait not tested    LABS:                        9.1    11.51 )-----------( 548      ( 30 Dec 2019 04:27 )             29.0    12-30    132<L>  |  90<L>  |  60<H>  ----------------------------<  127<H>  3.8   |  22  |  6.26<H>    Ca    9.0      30 Dec 2019 04:27  Phos  5.6     12-30      Hemoglobin A1C, Whole Blood: 8.4 % (12-15 @ 11:00)  Hemoglobin A1C, Whole Blood: 8.2 % (12-14 @ 14:04)  Hemoglobin A1C, Whole Blood: 8.2 % (12-14 @ 12:05)      IMAGING: Reviewed by me.     CT Head No Cont (12.24.19) Multifocal hypoattenuation involving the bilateral frontal and parietal and right occipital lobes is consistent with previously seen acute/subacute infarcts. No CT evidence for hemorrhagic transformation. No displaced calvarial fracture or appreciable scalp hematoma.    MRI Head No Cont (12.15.19) Numerous acute diffuse bilateral supratentorial and infratentorial infarcts.    CT Angio Head/Neck w/ IV Cont (12.14.19) Mild stenosis of the proximal left internal carotid artery of  up to 30% based on NASCET criteria. No stenosis of the cervical right   carotid artery based on NASCET criteria. Patent cervical vertebral arteries. No significant stenosis or occlusion of the major proximal arterial branches. THE PATIENT WAS SEEN AND EXAMINED BY ME WITH THE HOUSESTAFF AND STROKE TEAM DURING MORNING ROUNDS.   HPI:  64 year old male with a history of CAD s/p stents 2009 and 2019, HTN, DM, Carotid stenosis, Hypothyroidism, and ESRD on HD who presented to outside hospital with altered mental status as he was unresponsive, aphasic, and not moving any extremities. He was treated with tPA and admitted for observation.  Patient noted to have seizure during hospital course and was started on Keppra.  MRI brain was done which showed bilateral supratentorial and infratentorial infarcts.  Patient was undergoing dialysis when he developed A. fib and was intubated for airway protection.  Neurology following and multiple EEGs were done which showed patient in status epilepticus despite multiple antiepilectics.  Patient was transferred to [facility] for further management. Extubated on 12/21 and transferred to stroke unit.    SUBJECTIVE: No events overnight.  No new neurologic complaints.      acetaminophen    Suspension .. 650 milliGRAM(s) Oral every 4 hours PRN  albuterol/ipratropium for Nebulization 3 milliLiter(s) Nebulizer every 6 hours  aspirin  chewable 81 milliGRAM(s) Oral daily  atorvastatin 80 milliGRAM(s) Oral at bedtime  calcium acetate 667 milliGRAM(s) Oral three times a day with meals  carvedilol 3.125 milliGRAM(s) Oral every 12 hours  clopidogrel Tablet 75 milliGRAM(s) Oral daily  guaiFENesin   Syrup  (Sugar-Free) 200 milliGRAM(s) Oral every 6 hours PRN  heparin  Injectable 5000 Unit(s) SubCutaneous every 12 hours  insulin lispro (HumaLOG) corrective regimen sliding scale   SubCutaneous every 6 hours  insulin NPH human recombinant 18 Unit(s) SubCutaneous every 6 hours  lacosamide Solution 50 milliGRAM(s) Oral two times a day  levETIRAcetam  Solution 250 milliGRAM(s) Oral two times a day  levETIRAcetam  Solution 125 milliGRAM(s) Oral daily  levothyroxine 50 MICROGram(s) Oral daily  melatonin 3 milliGRAM(s) Oral at bedtime  pantoprazole   Suspension 40 milliGRAM(s) Oral two times a day  polyethylene glycol 3350 17 Gram(s) Oral daily  senna Syrup 10 milliLiter(s) Oral two times a day PRN      PHYSICAL EXAM:   Vital Signs Last 24 Hrs  T(C): 36.6 (30 Dec 2019 06:52), Max: 36.8 (29 Dec 2019 20:00)  T(F): 97.9 (30 Dec 2019 06:52), Max: 98.3 (29 Dec 2019 20:00)  HR: 75 (30 Dec 2019 08:00) (70 - 78)  BP: 152/73 (30 Dec 2019 08:00) (122/54 - 161/75)  BP(mean): 96 (30 Dec 2019 08:00) (73 - 104)  RR: 11 (30 Dec 2019 08:00) (11 - 23)  SpO2: 100% (30 Dec 2019 08:00) (95% - 100%)    General: No acute distress  HEENT: VF appear full , > right visual neglect   Abdomen: Soft, nontender, nondistended   Extremities: No edema    NEUROLOGICAL EXAM:  Mental status: Awake, alert, eyes open, speaks 1-3 words, attentive following commands. bradykinetic , hypophonic , recognizes arm , right hemineglect   Cranial Nerves: trace left facial droop, RHHA, no nystagmus, moderate to severe dysarthria,  tongue midline  Motor exam: LUE trace antigravity with drift < 3 sec down to bed, right arm trace movement distally in wrist at times, RLE/LLE 2/5   Sensation: Intact to light touch   Coordination/ Gait: No dysmetria, gait not tested    LABS:                        9.1    11.51 )-----------( 548      ( 30 Dec 2019 04:27 )             29.0    12-30    132<L>  |  90<L>  |  60<H>  ----------------------------<  127<H>  3.8   |  22  |  6.26<H>    Ca    9.0      30 Dec 2019 04:27  Phos  5.6     12-30      Hemoglobin A1C, Whole Blood: 8.4 % (12-15 @ 11:00)  Hemoglobin A1C, Whole Blood: 8.2 % (12-14 @ 14:04)  Hemoglobin A1C, Whole Blood: 8.2 % (12-14 @ 12:05)      IMAGING: Reviewed by me.     CT Head No Cont (12.24.19) Multifocal hypoattenuation involving the bilateral frontal and parietal and right occipital lobes is consistent with previously seen acute/subacute infarcts. No CT evidence for hemorrhagic transformation. No displaced calvarial fracture or appreciable scalp hematoma.    MRI Head No Cont (12.15.19) Numerous acute diffuse bilateral supratentorial and infratentorial infarcts.    CT Angio Head/Neck w/ IV Cont (12.14.19) Mild stenosis of the proximal left internal carotid artery of  up to 30% based on NASCET criteria. No stenosis of the cervical right   carotid artery based on NASCET criteria. Patent cervical vertebral arteries. No significant stenosis or occlusion of the major proximal arterial branches.

## 2019-12-30 NOTE — PROVIDER CONTACT NOTE (OTHER) - ACTION/TREATMENT ORDERED:
Chest Physiotherapy, decrease tube feed rate to 20ml/hr, and given midnight albuterol treatment at 10pm

## 2019-12-30 NOTE — PROGRESS NOTE ADULT - PROBLEM SELECTOR PLAN 1
with white-yellow secretions  -CT chest 12/24 negative for PNA  -Sputum culture: MSSA, Enterobacter could be colonizing   -RVP negative  -Observing off abx  -Secretions improved, please d/c nasal trumpet

## 2019-12-30 NOTE — PROGRESS NOTE ADULT - SUBJECTIVE AND OBJECTIVE BOX
Follow-up Pulm Progress Note    Lethargic, oriented x1-2  98% on RA  Secretions improved      Medications:  MEDICATIONS  (STANDING):  albuterol/ipratropium for Nebulization 3 milliLiter(s) Nebulizer every 6 hours  aspirin  chewable 81 milliGRAM(s) Oral daily  atorvastatin 80 milliGRAM(s) Oral at bedtime  calcium acetate 667 milliGRAM(s) Oral three times a day with meals  carvedilol 3.125 milliGRAM(s) Oral every 12 hours  clopidogrel Tablet 75 milliGRAM(s) Oral daily  dextrose 5%. 1000 milliLiter(s) (50 mL/Hr) IV Continuous <Continuous>  dextrose 50% Injectable 12.5 Gram(s) IV Push once  dextrose 50% Injectable 25 Gram(s) IV Push once  dextrose 50% Injectable 25 Gram(s) IV Push once  heparin  Injectable 5000 Unit(s) SubCutaneous every 12 hours  insulin lispro (HumaLOG) corrective regimen sliding scale   SubCutaneous every 6 hours  insulin NPH human recombinant 18 Unit(s) SubCutaneous every 6 hours  lacosamide Solution 50 milliGRAM(s) Oral two times a day  levETIRAcetam  Solution 250 milliGRAM(s) Oral two times a day  levETIRAcetam  Solution 125 milliGRAM(s) Oral daily  levothyroxine 50 MICROGram(s) Oral daily  melatonin 3 milliGRAM(s) Oral at bedtime  pantoprazole   Suspension 40 milliGRAM(s) Oral two times a day  polyethylene glycol 3350 17 Gram(s) Oral daily    MEDICATIONS  (PRN):  acetaminophen    Suspension .. 650 milliGRAM(s) Oral every 4 hours PRN Temp greater or equal to 38C (100.4F), Mild Pain (1 - 3), Moderate Pain (4 - 6), Severe Pain (7 - 10)  dextrose 40% Gel 15 Gram(s) Oral once PRN Blood Glucose LESS THAN 70 milliGRAM(s)/deciLiter  glucagon  Injectable 1 milliGRAM(s) IntraMuscular once PRN Glucose <70 milliGRAM(s)/deciLiter  guaiFENesin   Syrup  (Sugar-Free) 200 milliGRAM(s) Oral every 6 hours PRN Cough  senna Syrup 10 milliLiter(s) Oral two times a day PRN Constipation          Vital Signs Last 24 Hrs  T(C): 36.8 (30 Dec 2019 17:20), Max: 36.8 (29 Dec 2019 20:00)  T(F): 98.2 (30 Dec 2019 17:20), Max: 98.3 (29 Dec 2019 20:00)  HR: 79 (30 Dec 2019 17:20) (73 - 79)  BP: 147/63 (30 Dec 2019 17:20) (119/62 - 161/75)  BP(mean): 92 (30 Dec 2019 10:00) (80 - 104)  RR: 18 (30 Dec 2019 17:20) (11 - 23)  SpO2: 100% (30 Dec 2019 17:20) (95% - 100%) on RA          12-29 @ 07:01  -  12-30 @ 07:00  --------------------------------------------------------  IN: 650 mL / OUT: 700 mL / NET: -50 mL          LABS:                        9.1    11.51 )-----------( 548      ( 30 Dec 2019 04:27 )             29.0     12-30    132<L>  |  90<L>  |  60<H>  ----------------------------<  127<H>  3.8   |  22  |  6.26<H>    Ca    9.0      30 Dec 2019 04:27  Phos  5.6     12-30            CAPILLARY BLOOD GLUCOSE      POCT Blood Glucose.: 166 mg/dL (30 Dec 2019 12:39)                        CULTURES: (if applicable)  Culture Results:   Numerous Staphylococcus aureus  Moderate Enterobacter aerogenes  Moderate Enterobacter cloacae complex  Normal Respiratory Rachel present (12-24 @ 06:07)  Culture Results:   No growth at 5 days. (12-24 @ 03:56)  Culture Results:   No growth at 5 days. (12-24 @ 03:56)          Physical Examination:  PULM: Clear to auscultation bilaterally, no significant sputum production  CVS: S1, S2 heard    RADIOLOGY REVIEWED  CXR: 12/29: Pulm edema

## 2019-12-30 NOTE — PROGRESS NOTE ADULT - ASSESSMENT
dysphagia  cva  anemia    s/p peg  feeds as tolerated  proton pump inhibitor daily  no overt gi bleeding  cbc daily  will follow     Advanced care planning was discussed with patient and family.  Advanced care planning forms were reviewed and discussed.  Risks, benefits and alternatives of gastroenterologic procedures were discussed in detail and all questions were answered.    30 minutes spent.

## 2019-12-31 ENCOUNTER — TRANSCRIPTION ENCOUNTER (OUTPATIENT)
Age: 64
End: 2019-12-31

## 2019-12-31 LAB
ANION GAP SERPL CALC-SCNC: 17 MMOL/L — SIGNIFICANT CHANGE UP (ref 5–17)
BUN SERPL-MCNC: 30 MG/DL — HIGH (ref 7–23)
CALCIUM SERPL-MCNC: 8.7 MG/DL — SIGNIFICANT CHANGE UP (ref 8.4–10.5)
CHLORIDE SERPL-SCNC: 91 MMOL/L — LOW (ref 96–108)
CO2 SERPL-SCNC: 21 MMOL/L — LOW (ref 22–31)
CREAT SERPL-MCNC: 4.29 MG/DL — HIGH (ref 0.5–1.3)
GLUCOSE SERPL-MCNC: 157 MG/DL — HIGH (ref 70–99)
HCT VFR BLD CALC: 31.6 % — LOW (ref 39–50)
HGB BLD-MCNC: 10.1 G/DL — LOW (ref 13–17)
MCHC RBC-ENTMCNC: 28.6 PG — SIGNIFICANT CHANGE UP (ref 27–34)
MCHC RBC-ENTMCNC: 32 GM/DL — SIGNIFICANT CHANGE UP (ref 32–36)
MCV RBC AUTO: 89.5 FL — SIGNIFICANT CHANGE UP (ref 80–100)
NRBC # BLD: 0 /100 WBCS — SIGNIFICANT CHANGE UP (ref 0–0)
PLATELET # BLD AUTO: 536 K/UL — HIGH (ref 150–400)
POTASSIUM SERPL-MCNC: 3.7 MMOL/L — SIGNIFICANT CHANGE UP (ref 3.5–5.3)
POTASSIUM SERPL-SCNC: 3.7 MMOL/L — SIGNIFICANT CHANGE UP (ref 3.5–5.3)
RBC # BLD: 3.53 M/UL — LOW (ref 4.2–5.8)
RBC # FLD: 15.3 % — HIGH (ref 10.3–14.5)
SODIUM SERPL-SCNC: 129 MMOL/L — LOW (ref 135–145)
WBC # BLD: 10.89 K/UL — HIGH (ref 3.8–10.5)
WBC # FLD AUTO: 10.89 K/UL — HIGH (ref 3.8–10.5)

## 2019-12-31 PROCEDURE — 99233 SBSQ HOSP IP/OBS HIGH 50: CPT

## 2019-12-31 RX ORDER — LEVOTHYROXINE SODIUM 125 MCG
1 TABLET ORAL
Qty: 0 | Refills: 0 | DISCHARGE
Start: 2019-12-31

## 2019-12-31 RX ORDER — LACOSAMIDE 50 MG/1
5 TABLET ORAL
Qty: 0 | Refills: 0 | DISCHARGE
Start: 2019-12-31

## 2019-12-31 RX ORDER — CARVEDILOL PHOSPHATE 80 MG/1
1 CAPSULE, EXTENDED RELEASE ORAL
Qty: 0 | Refills: 0 | DISCHARGE
Start: 2019-12-31

## 2019-12-31 RX ORDER — LANOLIN ALCOHOL/MO/W.PET/CERES
1 CREAM (GRAM) TOPICAL
Qty: 0 | Refills: 0 | DISCHARGE
Start: 2019-12-31

## 2019-12-31 RX ORDER — IPRATROPIUM/ALBUTEROL SULFATE 18-103MCG
3 AEROSOL WITH ADAPTER (GRAM) INHALATION
Qty: 0 | Refills: 0 | DISCHARGE
Start: 2019-12-31

## 2019-12-31 RX ORDER — LEVETIRACETAM 250 MG/1
1.25 TABLET, FILM COATED ORAL
Qty: 0 | Refills: 0 | DISCHARGE
Start: 2019-12-31

## 2019-12-31 RX ORDER — LEVETIRACETAM 250 MG/1
2.5 TABLET, FILM COATED ORAL
Qty: 0 | Refills: 0 | DISCHARGE
Start: 2019-12-31

## 2019-12-31 RX ORDER — POLYETHYLENE GLYCOL 3350 17 G/17G
17 POWDER, FOR SOLUTION ORAL
Qty: 0 | Refills: 0 | DISCHARGE
Start: 2019-12-31

## 2019-12-31 RX ORDER — HUMAN INSULIN 100 [IU]/ML
18 INJECTION, SUSPENSION SUBCUTANEOUS
Qty: 0 | Refills: 0 | DISCHARGE
Start: 2019-12-31

## 2019-12-31 RX ORDER — PANTOPRAZOLE SODIUM 20 MG/1
40 TABLET, DELAYED RELEASE ORAL
Qty: 0 | Refills: 0 | DISCHARGE
Start: 2019-12-31

## 2019-12-31 RX ORDER — APIXABAN 2.5 MG/1
1 TABLET, FILM COATED ORAL
Qty: 0 | Refills: 0 | DISCHARGE
Start: 2019-12-31

## 2019-12-31 RX ORDER — CLOPIDOGREL BISULFATE 75 MG/1
1 TABLET, FILM COATED ORAL
Qty: 0 | Refills: 0 | DISCHARGE
Start: 2019-12-31

## 2019-12-31 RX ORDER — APIXABAN 2.5 MG/1
5 TABLET, FILM COATED ORAL EVERY 12 HOURS
Refills: 0 | Status: DISCONTINUED | OUTPATIENT
Start: 2019-12-31 | End: 2020-01-03

## 2019-12-31 RX ORDER — ATORVASTATIN CALCIUM 80 MG/1
1 TABLET, FILM COATED ORAL
Qty: 0 | Refills: 0 | DISCHARGE
Start: 2019-12-31

## 2019-12-31 RX ORDER — CALCIUM ACETATE 667 MG
667 TABLET ORAL
Qty: 0 | Refills: 0 | DISCHARGE
Start: 2019-12-31

## 2019-12-31 RX ADMIN — CLOPIDOGREL BISULFATE 75 MILLIGRAM(S): 75 TABLET, FILM COATED ORAL at 11:26

## 2019-12-31 RX ADMIN — CARVEDILOL PHOSPHATE 3.12 MILLIGRAM(S): 80 CAPSULE, EXTENDED RELEASE ORAL at 06:10

## 2019-12-31 RX ADMIN — HUMAN INSULIN 18 UNIT(S): 100 INJECTION, SUSPENSION SUBCUTANEOUS at 17:32

## 2019-12-31 RX ADMIN — LACOSAMIDE 50 MILLIGRAM(S): 50 TABLET ORAL at 06:08

## 2019-12-31 RX ADMIN — CARVEDILOL PHOSPHATE 3.12 MILLIGRAM(S): 80 CAPSULE, EXTENDED RELEASE ORAL at 17:21

## 2019-12-31 RX ADMIN — Medication 667 MILLIGRAM(S): at 15:37

## 2019-12-31 RX ADMIN — Medication 50 MICROGRAM(S): at 06:08

## 2019-12-31 RX ADMIN — SENNA PLUS 10 MILLILITER(S): 8.6 TABLET ORAL at 06:10

## 2019-12-31 RX ADMIN — Medication 200 MILLIGRAM(S): at 06:09

## 2019-12-31 RX ADMIN — LACOSAMIDE 50 MILLIGRAM(S): 50 TABLET ORAL at 17:43

## 2019-12-31 RX ADMIN — Medication 3 MILLILITER(S): at 06:09

## 2019-12-31 RX ADMIN — Medication 667 MILLIGRAM(S): at 06:08

## 2019-12-31 RX ADMIN — ATORVASTATIN CALCIUM 80 MILLIGRAM(S): 80 TABLET, FILM COATED ORAL at 21:14

## 2019-12-31 RX ADMIN — Medication 667 MILLIGRAM(S): at 21:14

## 2019-12-31 RX ADMIN — APIXABAN 5 MILLIGRAM(S): 2.5 TABLET, FILM COATED ORAL at 21:17

## 2019-12-31 RX ADMIN — POLYETHYLENE GLYCOL 3350 17 GRAM(S): 17 POWDER, FOR SOLUTION ORAL at 11:27

## 2019-12-31 RX ADMIN — LEVETIRACETAM 250 MILLIGRAM(S): 250 TABLET, FILM COATED ORAL at 06:09

## 2019-12-31 RX ADMIN — HUMAN INSULIN 18 UNIT(S): 100 INJECTION, SUSPENSION SUBCUTANEOUS at 06:10

## 2019-12-31 RX ADMIN — Medication 3 MILLIGRAM(S): at 21:14

## 2019-12-31 RX ADMIN — HUMAN INSULIN 18 UNIT(S): 100 INJECTION, SUSPENSION SUBCUTANEOUS at 12:28

## 2019-12-31 RX ADMIN — Medication 3 MILLILITER(S): at 17:21

## 2019-12-31 RX ADMIN — PANTOPRAZOLE SODIUM 40 MILLIGRAM(S): 20 TABLET, DELAYED RELEASE ORAL at 17:21

## 2019-12-31 RX ADMIN — LEVETIRACETAM 125 MILLIGRAM(S): 250 TABLET, FILM COATED ORAL at 11:24

## 2019-12-31 RX ADMIN — LEVETIRACETAM 250 MILLIGRAM(S): 250 TABLET, FILM COATED ORAL at 17:20

## 2019-12-31 RX ADMIN — Medication 3 MILLILITER(S): at 11:24

## 2019-12-31 RX ADMIN — Medication 2: at 12:28

## 2019-12-31 RX ADMIN — PANTOPRAZOLE SODIUM 40 MILLIGRAM(S): 20 TABLET, DELAYED RELEASE ORAL at 06:08

## 2019-12-31 RX ADMIN — APIXABAN 5 MILLIGRAM(S): 2.5 TABLET, FILM COATED ORAL at 11:26

## 2019-12-31 RX ADMIN — HEPARIN SODIUM 5000 UNIT(S): 5000 INJECTION INTRAVENOUS; SUBCUTANEOUS at 06:09

## 2019-12-31 NOTE — DISCHARGE NOTE PROVIDER - NSDCCAREPROVSEEN_GEN_ALL_CORE_FT
[facility] Stroke Floor, Team [facility] Stroke Floor, Team  Shen De Dios  [facility] Medicine, Advance PracticeTeam

## 2019-12-31 NOTE — DISCHARGE NOTE PROVIDER - NSDCCPCAREPLAN_GEN_ALL_CORE_FT
PRINCIPAL DISCHARGE DIAGNOSIS  Diagnosis: Cerebral infarct  Assessment and Plan of Treatment: Please follow up with neurologist after being discharged from rehab. Continue taking medications as prescribed. Monitor your blood pressure. Reduce fat, cholesterol and salt in your diet. Increase intake of fruits and vegetables. Limit alcohol to minimum and do not smoke. You may be at risk for falling, make changes to your home to help you walk easier. Keep up to date on vaccinations.  If you experience any symptoms of facial drooping, slurred speech, arm or leg weakness, severe headache, vision changes or any worsening symptoms, notify provider immediatley and return to ER.        SECONDARY DISCHARGE DIAGNOSES  Diagnosis: Atrial fibrillation  Assessment and Plan of Treatment: Patient started on Eliquis 5 mg BID and Plavix for recent history of coronary stents and A.fib. If weight is <60 kg, please switch to Eliquis 2.5 mg BID. PRINCIPAL DISCHARGE DIAGNOSIS  Diagnosis: Cerebral infarct  Assessment and Plan of Treatment: Please follow up with neurologist after being discharged from rehab. Continue taking medications as prescribed. Monitor your blood pressure. Reduce fat, cholesterol and salt in your diet. Increase intake of fruits and vegetables. Limit alcohol to minimum and do not smoke. You may be at risk for falling, make changes to your home to help you walk easier. Keep up to date on vaccinations.  If you experience any symptoms of facial drooping, slurred speech, arm or leg weakness, severe headache, vision changes or any worsening symptoms, notify provider immediatley and return to ER.        SECONDARY DISCHARGE DIAGNOSES  Diagnosis: Status epilepticus  Assessment and Plan of Treatment: Continue with Keppra and Vimpat as prescribed.  Follow up with Neurology after discharge from Rehab.    Diagnosis: Dysphagia  Assessment and Plan of Treatment: S/p PEG placement on 12/27 for nutrition and medication administration    Diagnosis: UTI (urinary tract infection)  Assessment and Plan of Treatment: Antibiotic treatment completed.    Diagnosis: Atrial fibrillation  Assessment and Plan of Treatment: Patient started on Eliquis 5 mg BID and Plavix for recent history of coronary stents and A.fib. If weight is <60 kg, please switch to Eliquis 2.5 mg BID.    Diagnosis: Type 2 diabetes mellitus with other circulatory complication, with long-term current use of insulin  Assessment and Plan of Treatment: HgA1C this admission 8.4  Make sure you get your HgA1c checked every three months.  If you take oral diabetes medications, check your blood glucose two times a day.  If you take insulin, check your blood glucose before meals and at bedtime.  It's important not to skip any meals.  Keep a log of your blood glucose results and always take it with you to your doctor appointments.  Keep a list of your current medications including injectables and over the counter medications and bring this medication list with you to all your doctor appointments.  If you have not seen your ophthalmologist this year call for appointment.  Check your feet daily for redness, sores, or openings. Do not self treat. If no improvement in two days call your primary care physician for an appointment.    Diagnosis: ESRD (end stage renal disease)  Assessment and Plan of Treatment: Hemodialysis will continue in Rehab.  Follow up with your Nephrologist when discharged from Rehab.

## 2019-12-31 NOTE — DISCHARGE NOTE PROVIDER - HOSPITAL COURSE
64 year old man with a history of CAD s/p stents 2009 and 2019, HTN, DM, Carotid stenosis, Hypothyroidism, and ESRD on HD who presented to outside hospital with altered mental status as he was unresponsive, aphasic, and not moving any extremities. He was treated with tPA and admitted for observation.  Patient noted to have seizure during hospital course and was started on Keppra.  MRI brain showed bilateral supratentorial and infratentorial infarcts.  Patient was undergoing dialysis when he developed A. fib and was intubated for airway protection.  Neurology following and multiple EEGs were done which showed patient in status epilepticus despite multiple antiepilectics.  Patient was transferred to [facility] for further management. Extubated on 12/21 and transferred to stroke unit.        CT HEAD ( 12/30/19): Left basal ganglia lacunar infarct unchanged from 12/24/2019.    CT Head No Cont (12.24.19) Multifocal hypoattenuation involving the bilateral frontal and parietal and right occipital lobes is consistent with previously seen acute/subacute infarcts. No CT evidence for hemorrhagic transformation. No displaced calvarial fracture or appreciable scalp hematoma.    MRI Head No Cont (12.15.19) Numerous acute diffuse bilateral supratentorial and infratentorial infarcts.    CT Angio Head/Neck w/ IV Cont (12.14.19) Mild stenosis of the proximal left internal carotid artery of  up to 30% based on NASCET criteria. No stenosis of the cervical right     carotid artery based on NASCET criteria. Patent cervical vertebral arteries. No significant stenosis or occlusion of the major proximal arterial branches.         Impression: extensive bihemispheric infarcts in the setting of bilateral carotid stenosis and hypoperfusion.        Patient discharged on Eliquis ( 5 mg BID, if his weight falls < 60 kg, switch to 2.5 mg BID) and Plavix, Ac and antiplatelet due to recent coronary stents.         TTE Left ventricular ejection fraction, by visual estimation, is 60 to 65%. Trace mitral valve regurgitation.    s/p EGD on 12/28 that showed non-bleeding gastric ulcers- on Protonix 40 mg PO BID. PEG placed. Tolerating tube feeds.    ESRD awaiting transplant on HD M/W/F, - please dose Keppra post HD on HD days      Anemic during hospital course, s/p 2 units of PRBC.    LE doppler negative for DVT.    Pulm and ID consulted for cough and leukocytosis:  -CT chest 12/24 negative for PNA    -Sputum culture: MSSA, Enterobacter could be colonizing     -RVP negative    -Observed off abx        Patient evaluated by PT/OT and was recomonnended OK. Patient stable for discharge. 64 year old man with a history of CAD s/p stents 2009 and 2019, HTN, DM, Carotid stenosis, Hypothyroidism, and ESRD on HD who presented to outside hospital with altered mental status as he was unresponsive, aphasic, and not moving any extremities. He was treated with tPA and admitted for observation.  Patient noted to have seizure during hospital course and was started on Keppra.  MRI brain showed bilateral supratentorial and infratentorial infarcts.  Patient was undergoing dialysis when he developed A. fib and was intubated for airway protection.  Neurology following and multiple EEGs were done which showed patient in status epilepticus despite multiple antiepilectics.  Patient was transferred to [facility] for further management. Extubated on 12/21 and transferred to stroke unit.        CT HEAD ( 12/30/19): Left basal ganglia lacunar infarct unchanged from 12/24/2019.    CT Head No Cont (12.24.19) Multifocal hypoattenuation involving the bilateral frontal and parietal and right occipital lobes is consistent with previously seen acute/subacute infarcts. No CT evidence for hemorrhagic transformation. No displaced calvarial fracture or appreciable scalp hematoma.    MRI Head No Cont (12.15.19) Numerous acute diffuse bilateral supratentorial and infratentorial infarcts.    CT Angio Head/Neck w/ IV Cont (12.14.19) Mild stenosis of the proximal left internal carotid artery of  up to 30% based on NASCET criteria. No stenosis of the cervical right     carotid artery based on NASCET criteria. Patent cervical vertebral arteries. No significant stenosis or occlusion of the major proximal arterial branches.         Patient started on Eliquis ( 5 mg BID, if his weight falls < 60 kg, switch to 2.5 mg BID) and Plavix, Ac and antiplatelet due to recent coronary stents and cardioembolic etiology of strokes suspected.         TTE Left ventricular ejection fraction, by visual estimation, is 60 to 65%. Trace mitral valve regurgitation.    s/p EGD on 12/28 that showed non-bleeding gastric ulcers- on Protonix 40 mg PO BID. PEG placed. Tolerating tube feeds.    ESRD awaiting transplant on HD M/W/F, - please dose Keppra post HD on HD days      Anemic during hospital course, s/p 2 units of PRBC.    LE doppler negative for DVT.    Pulm and ID consulted for cough and leukocytosis:  -CT chest 12/24 negative for PNA    -Sputum culture: MSSA, Enterobacter could be colonizing     -RVP negative    -Observed off abx        Patient evaluated by PT/OT and was recomonnended OK with HD- deemed stable for discharge on antiepileptics and AC + plavix.

## 2019-12-31 NOTE — PROGRESS NOTE ADULT - ASSESSMENT
65 yo male with ESRD on HD (M-W-FRI without AVF as awaiting renal transplant expected within 3 months; Dr. Barrientos; [facility]), CAD s/p stent 2009 and CARLA in June 2019, HTN, DM type 2, hypothyroidism and left carotid stenosis admitted for altered mental status during dialysis, he became unresponsive and arm not moving therefore he was sent to hospital. At [facility], he received tPA on 12/13/19. Was noted to have SAH after tPA. Started on 3% saline. Had seizure, despite multiple AEDs remained in status epilepticus and was transferred to [facility] for continuous EEG monitoring. s/p extubation, PEG tube placement. Is awake and following commands. Nephrology is following for ESRD management.    ESRD on HD MWF  - HD consent in the chart    - Last HD yesterday 12/30/19  - anticipate next HD Thursday 1/2/20 at rehab;  confirming currently, may need HD here if HD at rehab will not be until Friday 1/3/19. Will await feedback    - please dose Keppra post HD on HD days      Hyponatremia  - from increased free water intake  - stop free water flushes  - continue tube feeds w/o flushes  - should improve w/ HD    Anemia  - Hgb at goal today; unable to use epogen due to CVA  - transfuse PRN Hgb <7 or hemodynamically significant bleeding  - no evidence of occult bleeding  - GI following     HTN  - well controlled  - continue coreg  - Will avoid hypotension w/ HD 65 yo male with ESRD on HD (M-W-FRI without AVF as awaiting renal transplant expected within 3 months; Dr. Barrientos; [facility]), CAD s/p stent 2009 and CARLA in June 2019, HTN, DM type 2, hypothyroidism and left carotid stenosis admitted for altered mental status during dialysis, he became unresponsive and arm not moving therefore he was sent to hospital. At [facility], he received tPA on 12/13/19. Was noted to have SAH after tPA. Started on 3% saline. Had seizure, despite multiple AEDs remained in status epilepticus and was transferred to [facility] for continuous EEG monitoring. s/p extubation, PEG tube placement. Is awake and following commands. Nephrology is following for ESRD management.    ESRD on HD MWF  - HD consent in the chart    - Last HD yesterday 12/30/19  - anticipate next HD Thursday 1/2/20     - please dose Keppra post HD on HD days      - not accepted to rehab yet; needs rehab w/ onsite HD. Family refusing [facility]. Recommended [facility].     Hyponatremia  - from increased free water intake  - stop free water flushes  - continue tube feeds w/o flushes  - should improve w/ HD    Anemia  - Hgb at goal today; unable to use epogen due to CVA  - transfuse PRN Hgb <7 or hemodynamically significant bleeding  - no evidence of occult bleeding  - GI following     HTN  - well controlled  - continue coreg  - Will avoid hypotension w/ HD

## 2019-12-31 NOTE — PROGRESS NOTE ADULT - PROBLEM SELECTOR PLAN 1
Improved  -d/c nasal trumpet   -CT chest 12/24 negative for PNA  -Sputum culture: MSSA, Enterobacter could be colonizing   -RVP negative  -Duoneb PRN

## 2019-12-31 NOTE — PROGRESS NOTE ADULT - SUBJECTIVE AND OBJECTIVE BOX
[facility]   MD Yokasta Gary D.O. Fatima Sheikh, D.O. Ruoru Wong, PA    From 7 AM - 5 PM:  OFFICE: [phone]  Dr. Barrientos cell: [phone]  Dr. Lopes cell: [phone]  Dr. Witt cell: [phone]  WALDO Valentin cell: [phone]    From 5 PM - 7 AM: Answering Service: [phone]        RENAL FOLLOW UP NOTE  --------------------------------------------------------------------------------  HPI: Pt seen and examined. States he feels terrible because he cannot move on his own. Denies any cough, CP, SOB, h/a, N/V, problems w/ HD yesterday.         PAST HISTORY  --------------------------------------------------------------------------------  No significant changes to PMH, PSH, FHx, SHx, unless otherwise noted    ALLERGIES & MEDICATIONS  --------------------------------------------------------------------------------  Allergies    No Known Allergies    Intolerances      Standing Inpatient Medications  albuterol/ipratropium for Nebulization 3 milliLiter(s) Nebulizer every 6 hours  apixaban 5 milliGRAM(s) Oral every 12 hours  atorvastatin 80 milliGRAM(s) Oral at bedtime  calcium acetate 667 milliGRAM(s) Oral three times a day with meals  carvedilol 3.125 milliGRAM(s) Oral every 12 hours  clopidogrel Tablet 75 milliGRAM(s) Oral daily  dextrose 5%. 1000 milliLiter(s) IV Continuous <Continuous>  dextrose 50% Injectable 12.5 Gram(s) IV Push once  dextrose 50% Injectable 25 Gram(s) IV Push once  dextrose 50% Injectable 25 Gram(s) IV Push once  insulin lispro (HumaLOG) corrective regimen sliding scale   SubCutaneous every 6 hours  insulin NPH human recombinant 18 Unit(s) SubCutaneous every 6 hours  lacosamide Solution 50 milliGRAM(s) Oral two times a day  levETIRAcetam  Solution 250 milliGRAM(s) Oral two times a day  levETIRAcetam  Solution 125 milliGRAM(s) Oral daily  levothyroxine 50 MICROGram(s) Oral daily  melatonin 3 milliGRAM(s) Oral at bedtime  pantoprazole   Suspension 40 milliGRAM(s) Oral two times a day  polyethylene glycol 3350 17 Gram(s) Oral daily    PRN Inpatient Medications  acetaminophen    Suspension .. 650 milliGRAM(s) Oral every 4 hours PRN  dextrose 40% Gel 15 Gram(s) Oral once PRN  glucagon  Injectable 1 milliGRAM(s) IntraMuscular once PRN  guaiFENesin   Syrup  (Sugar-Free) 200 milliGRAM(s) Oral every 6 hours PRN  senna Syrup 10 milliLiter(s) Oral two times a day PRN      REVIEW OF SYSTEMS  --------------------------------------------------------------------------------  General: no fever  CVS: no chest pain  RESP: no sob, no cough  ABD: no abdominal pain  : no dysuria,  MSK: no edema     VITALS/PHYSICAL EXAM  --------------------------------------------------------------------------------  T(C): 36.6 (12-31-19 @ 06:00), Max: 36.9 (12-30-19 @ 20:00)  HR: 70 (12-31-19 @ 10:00) (70 - 86)  BP: 144/67 (12-31-19 @ 10:00) (112/57 - 158/81)  RR: 9 (12-31-19 @ 10:00) (9 - 30)  SpO2: 97% (12-31-19 @ 10:00) (95% - 100%)  Wt(kg): --        12-30-19 @ 07:01  -  12-31-19 @ 07:00  --------------------------------------------------------  IN: 1340 mL / OUT: 2750 mL / NET: -1410 mL      Physical Exam:  	Gen: NAD  	HEENT: MMM  	Pulm: CTA B/L  	CV: S1S2  	Abd: Soft, +BS  	Ext: No LE edema B/L              Neuro: Awake, following commands, coherent speech  	Skin: Warm and Dry   	Vascular access: [facility] in place, dressing c/d/i    LABS/STUDIES  --------------------------------------------------------------------------------              10.1   10.89 >-----------<  536      [12-31-19 @ 04:58]              31.6     129  |  91  |  30  ----------------------------<  157      [12-31-19 @ 04:58]  3.7   |  21  |  4.29        Ca     8.7     [12-31-19 @ 04:58]      Phos  5.6     [12-30-19 @ 04:27]            Creatinine Trend:  SCr 4.29 [12-31 @ 04:58]  SCr 6.26 [12-30 @ 04:27]  SCr 4.57 [12-29 @ 05:08]  SCr 6.41 [12-28 @ 07:18]  SCr 3.75 [12-27 @ 01:14]    Urinalysis - [12-24-19 @ 01:57]      Color Yellow / Appearance Clear / SG 1.017 / pH 6.5      Gluc 500 mg/dL / Ketone Negative  / Bili Negative / Urobili Negative       Blood Small / Protein 300 mg/dL / Leuk Est Negative / Nitrite Negative      RBC 3 / WBC 4 / Hyaline 1 / Gran  / Sq Epi  / Non Sq Epi 1 / Bacteria Negative      Iron 45, TIBC 145, %sat 31      [12-26-19 @ 23:24]  Ferritin 1369      [12-26-19 @ 23:24]  PTH -- (Ca 6.9)      [06-05-19 @ 08:14]   562  HbA1c 8.4      [12-15-19 @ 11:00]  TSH 0.88      [12-19-19 @ 10:33]  Lipid: chol 134, , HDL 56, LDL 52      [12-15-19 @ 10:56]    HBsAb >1000.0      [12-14-19 @ 23:48]  HBsAg Nonreact      [12-14-19 @ 23:48]  HCV 0.15, Nonreact      [12-14-19 @ 23:48]

## 2019-12-31 NOTE — PROGRESS NOTE ADULT - SUBJECTIVE AND OBJECTIVE BOX
INTERVAL HPI/OVERNIGHT EVENTS:    pt seen and examined  family at bedside  pt is tolerating peg feeds, denies abdominal pain, n/v     MEDICATIONS  (STANDING):  albuterol/ipratropium for Nebulization 3 milliLiter(s) Nebulizer every 6 hours  apixaban 5 milliGRAM(s) Oral every 12 hours  atorvastatin 80 milliGRAM(s) Oral at bedtime  calcium acetate 667 milliGRAM(s) Oral three times a day with meals  carvedilol 3.125 milliGRAM(s) Oral every 12 hours  clopidogrel Tablet 75 milliGRAM(s) Oral daily  dextrose 5%. 1000 milliLiter(s) (50 mL/Hr) IV Continuous <Continuous>  dextrose 50% Injectable 12.5 Gram(s) IV Push once  dextrose 50% Injectable 25 Gram(s) IV Push once  dextrose 50% Injectable 25 Gram(s) IV Push once  insulin lispro (HumaLOG) corrective regimen sliding scale   SubCutaneous every 6 hours  insulin NPH human recombinant 18 Unit(s) SubCutaneous every 6 hours  lacosamide Solution 50 milliGRAM(s) Oral two times a day  levETIRAcetam  Solution 250 milliGRAM(s) Oral two times a day  levETIRAcetam  Solution 125 milliGRAM(s) Oral daily  levothyroxine 50 MICROGram(s) Oral daily  melatonin 3 milliGRAM(s) Oral at bedtime  pantoprazole   Suspension 40 milliGRAM(s) Oral two times a day  polyethylene glycol 3350 17 Gram(s) Oral daily    MEDICATIONS  (PRN):  acetaminophen    Suspension .. 650 milliGRAM(s) Oral every 4 hours PRN Temp greater or equal to 38C (100.4F), Mild Pain (1 - 3), Moderate Pain (4 - 6), Severe Pain (7 - 10)  dextrose 40% Gel 15 Gram(s) Oral once PRN Blood Glucose LESS THAN 70 milliGRAM(s)/deciLiter  glucagon  Injectable 1 milliGRAM(s) IntraMuscular once PRN Glucose <70 milliGRAM(s)/deciLiter  guaiFENesin   Syrup  (Sugar-Free) 200 milliGRAM(s) Oral every 6 hours PRN Cough  senna Syrup 10 milliLiter(s) Oral two times a day PRN Constipation      Allergies    No Known Allergies    Intolerances        Review of Systems:    General:  No wt loss, fevers, chills, night sweats,fatigue,   Eyes:  Good vision, no reported pain  ENT:  No sore throat, pain, runny nose, dysphagia  CV:  No pain, palpitations, hypo/hypertension  Resp:  No dyspnea, cough, tachypnea, wheezing  GI:  No pain, No nausea, No vomiting, No diarrhea, No constipation, No weight loss, No fever, No pruritis, No rectal bleeding, No melena, No dysphagia  :  No pain, bleeding, incontinence, nocturia  Muscle:  No pain, weakness  Neuro:  No weakness, tingling, memory problems  Psych:  No fatigue, insomnia, mood problems, depression  Endocrine:  No polyuria, polydypsia, cold/heat intolerance  Heme:  No petechiae, ecchymosis, easy bruisability  Skin:  No rash, tattoos, scars, edema      Vital Signs Last 24 Hrs  T(C): 36.6 (31 Dec 2019 06:00), Max: 36.9 (30 Dec 2019 20:00)  T(F): 97.9 (31 Dec 2019 06:00), Max: 98.4 (30 Dec 2019 20:00)  HR: 70 (31 Dec 2019 10:00) (70 - 86)  BP: 144/67 (31 Dec 2019 10:00) (112/57 - 158/81)  BP(mean): 73 (31 Dec 2019 08:00) (73 - 115)  RR: 9 (31 Dec 2019 10:00) (9 - 30)  SpO2: 97% (31 Dec 2019 10:00) (95% - 100%)    PHYSICAL EXAM:    ill appearing  frail  non toxic  soft, nt +peg   no edema      LABS:                        10.1   10.89 )-----------( 536      ( 31 Dec 2019 04:58 )             31.6     12-31    129<L>  |  91<L>  |  30<H>  ----------------------------<  157<H>  3.7   |  21<L>  |  4.29<H>    Ca    8.7      31 Dec 2019 04:58  Phos  5.6     12-30            RADIOLOGY & ADDITIONAL TESTS:

## 2019-12-31 NOTE — DISCHARGE NOTE PROVIDER - NSDCMRMEDTOKEN_GEN_ALL_CORE_FT
apixaban 5 mg oral tablet: 1 tab(s) by gastrostomy tube every 12 hours  atorvastatin 80 mg oral tablet: 1 tab(s) by gastrostomy tube once a day (at bedtime)  calcium acetate 667 mg oral tablet: 667 milligram(s) by gastrostomy tube 3 times a day  carvedilol 3.125 mg oral tablet: 1 tab(s) by gastrostomy tube every 12 hours  clopidogrel 75 mg oral tablet: 1 tab(s) by gastrostomy tube once a day  insulin isophane (NPH) 100 units/mL human recombinant subcutaneous suspension: 18 unit(s) subcutaneous every 6 hours  insulin lispro 100 units/mL subcutaneous solution: 2 Unit(s) if Glucose 151 - 200  4 Unit(s) if Glucose 201 - 250  6 Unit(s) if Glucose 251 - 300  8 Unit(s) if Glucose 301 - 350  10 Unit(s) if Glucose 351 - 400  12 Unit(s) if Glucose Greater Than 400  EVERY 6 HOURS  ipratropium-albuterol 0.5 mg-2.5 mg/3 mLinhalation solution: 3 milliliter(s) inhaled every 6 hours  lacosamide 10 mg/mL oral solution: 5 milliliter(s) orally 2 times a day  levETIRAcetam 100 mg/mL oral solution: 1.25 milliliter(s) orally once a day  Only give on hemodilaysis days. Give supplemental dose after HD. Do not give on non-HD days  levETIRAcetam 100 mg/mL oral solution: 2.5 milliliter(s) orally 2 times a day  levothyroxine 50 mcg (0.05 mg) oral tablet: 1 tab(s) by gastrostomy tube once a day  melatonin 3 mg oral tablet: 1 tab(s) by gastrostomy tube once a day (at bedtime)  pantoprazole 40 mg oral granule, delayed release: 40 milligram(s) by gastrostomy tube 2 times a day  polyethylene glycol 3350 oral powder for reconstitution: 17 gram(s) by gastrostomy tube once a day acetaminophen 325 mg oral tablet: 2 tab(s) orally every 6 hours, As needed, for Mild and Moderate Pain (1 - 6)  apixaban 5 mg oral tablet: 1 tab(s) by gastrostomy tube every 12 hours  atorvastatin 80 mg oral tablet: 1 tab(s) by gastrostomy tube once a day (at bedtime)  calcium acetate 667 mg oral tablet: 667 milligram(s) by gastrostomy tube 3 times a day  carvedilol 3.125 mg oral tablet: 1 tab(s) by gastrostomy tube every 12 hours  clopidogrel 75 mg oral tablet: 1 tab(s) by gastrostomy tube once a day  famotidine 40 mg/5 mL oral liquid: 2.5 milliliter(s) by gastrostomy tube every 48 hours  fluconazole 40 mg/mL oral liquid: 2.5 milliliter(s) orally once a day x5 more days. (Total 14 days)  insulin lispro 100 units/mL subcutaneous solution: 2 Unit(s) if Glucose 151 - 200  4 Unit(s) if Glucose 201 - 250  6 Unit(s) if Glucose 251 - 300  8 Unit(s) if Glucose 301 - 350  10 Unit(s) if Glucose 351 - 400  12 Unit(s) if Glucose Greater Than 400  EVERY 6 HOURS  ipratropium-albuterol 0.5 mg-2.5 mg/3 mLinhalation solution: 3 milliliter(s) inhaled every 6 hours  lacosamide 10 mg/mL oral solution: 5 milliliter(s) by gastrostomy tube 2 times a day  lactobacillus acidophilus oral capsule: 1 cap(s) by gastrostomy tube once a day  Lantus 100 units/mL subcutaneous solution: 12 unit(s) subcutaneous once a day (at bedtime)  levETIRAcetam 100 mg/mL oral solution: 1.25 milliliter(s) by gastrostomy tube once a day -HD days  levETIRAcetam 100 mg/mL oral solution: 2.5 milliliter(s) by gastrostomy tube 2 times a day  levothyroxine 50 mcg (0.05 mg) oral tablet: 1 tab(s) by gastrostomy tube once a day  melatonin 3 mg oral tablet: 1 tab(s) by gastrostomy tube once a day (at bedtime)  pantoprazole 40 mg oral granule, delayed release: 40 milligram(s) by gastrostomy tube 2 times a day  polyethylene glycol 3350 oral powder for reconstitution: 17 gram(s) by gastrostomy tube once a day

## 2019-12-31 NOTE — PROGRESS NOTE ADULT - SUBJECTIVE AND OBJECTIVE BOX
THE PATIENT WAS SEEN AND EXAMINED BY ME WITH THE HOUSESTAFF AND STROKE TEAM DURING MORNING ROUNDS.   HPI:  64 year old male with a history of CAD s/p stents 2009 and 2019, HTN, DM, Carotid stenosis, Hypothyroidism, and ESRD on HD who presented to outside hospital with altered mental status as he was unresponsive, aphasic, and not moving any extremities. He was treated with tPA and admitted for observation.  Patient noted to have seizure during hospital course and was started on Keppra.  MRI brain was done which showed bilateral supratentorial and infratentorial infarcts.  Patient was undergoing dialysis when he developed A. fib and was intubated for airway protection.  Neurology following and multiple EEGs were done which showed patient in status epilepticus despite multiple antiepilectics.  Patient was transferred to [facility] for further management. Extubated on 12/21 and transferred to stroke unit.    SUBJECTIVE: No events overnight.  No new neurologic complaints.      acetaminophen    Suspension .. 650 milliGRAM(s) Oral every 4 hours PRN  albuterol/ipratropium for Nebulization 3 milliLiter(s) Nebulizer every 6 hours  apixaban 5 milliGRAM(s) Oral every 12 hours  atorvastatin 80 milliGRAM(s) Oral at bedtime  calcium acetate 667 milliGRAM(s) Oral three times a day with meals  carvedilol 3.125 milliGRAM(s) Oral every 12 hours  clopidogrel Tablet 75 milliGRAM(s) Oral daily  dextrose 40% Gel 15 Gram(s) Oral once PRN  dextrose 5%. 1000 milliLiter(s) IV Continuous <Continuous>  dextrose 50% Injectable 12.5 Gram(s) IV Push once  dextrose 50% Injectable 25 Gram(s) IV Push once  dextrose 50% Injectable 25 Gram(s) IV Push once  glucagon  Injectable 1 milliGRAM(s) IntraMuscular once PRN  guaiFENesin   Syrup  (Sugar-Free) 200 milliGRAM(s) Oral every 6 hours PRN  insulin lispro (HumaLOG) corrective regimen sliding scale   SubCutaneous every 6 hours  insulin NPH human recombinant 18 Unit(s) SubCutaneous every 6 hours  lacosamide Solution 50 milliGRAM(s) Oral two times a day  levETIRAcetam  Solution 250 milliGRAM(s) Oral two times a day  levETIRAcetam  Solution 125 milliGRAM(s) Oral daily  levothyroxine 50 MICROGram(s) Oral daily  melatonin 3 milliGRAM(s) Oral at bedtime  pantoprazole   Suspension 40 milliGRAM(s) Oral two times a day  polyethylene glycol 3350 17 Gram(s) Oral daily  senna Syrup 10 milliLiter(s) Oral two times a day PRN      PHYSICAL EXAM:   Vital Signs Last 24 Hrs  T(C): 36.6 (31 Dec 2019 06:00), Max: 36.9 (30 Dec 2019 20:00)  T(F): 97.9 (31 Dec 2019 06:00), Max: 98.4 (30 Dec 2019 20:00)  HR: 70 (31 Dec 2019 10:00) (70 - 86)  BP: 144/67 (31 Dec 2019 10:00) (112/57 - 158/81)  BP(mean): 73 (31 Dec 2019 08:00) (73 - 115)  RR: 9 (31 Dec 2019 10:00) (9 - 30)  SpO2: 97% (31 Dec 2019 10:00) (95% - 100%)    General: No acute distress  HEENT: VF appear full , right visual neglect   Abdomen: Soft, nontender, nondistended   Extremities: No edema    NEUROLOGICAL EXAM:  Mental status: Awake, alert, eyes open, speaks 1-3 words, attentive following commands. bradykinetic , hypophonic , recognizes arm , right hemineglect   Cranial Nerves: trace left facial droop, RHHA, no nystagmus, moderate to severe dysarthria,  tongue midline  Motor exam: LUE trace antigravity with drift < 3 sec down to bed, right arm trace movement distally in wrist at times but requires extensive prompting, RLE 2/5 /LLE 3/5   Sensation: Intact to light touch   Coordination/ Gait: No dysmetria, gait not tested    LABS:                        10.1   10.89 )-----------( 536      ( 31 Dec 2019 04:58 )             31.6    12-31    129<L>  |  91<L>  |  30<H>  ----------------------------<  157<H>  3.7   |  21<L>  |  4.29<H>    Ca    8.7      31 Dec 2019 04:58  Phos  5.6     12-30      Hemoglobin A1C, Whole Blood: 8.4 % (12-15 @ 11:00)  Hemoglobin A1C, Whole Blood: 8.2 % (12-14 @ 14:04)  Hemoglobin A1C, Whole Blood: 8.2 % (12-14 @ 12:05)      IMAGING: Reviewed by me.       CT Head No Cont (12.30.19)  Left basal ganglia lacunar infarct unchanged from 12/24/2019.         CT Head No Cont (12.24.19) Multifocal hypoattenuation involving the bilateral frontal and parietal and right occipital lobes is consistent with previously seen acute/subacute infarcts. No CT evidence for hemorrhagic transformation. No displaced calvarial fracture or appreciable scalp hematoma.    MRI Head No Cont (12.15.19) Numerous acute diffuse bilateral supratentorial and infratentorial infarcts.    CT Angio Head/Neck w/ IV Cont (12.14.19) Mild stenosis of the proximal left internal carotid artery of  up to 30% based on NASCET criteria. No stenosis of the cervical right   carotid artery based on NASCET criteria. Patent cervical vertebral arteries. No significant stenosis or occlusion of the major proximal arterial branches. THE PATIENT WAS SEEN AND EXAMINED BY ME WITH THE HOUSESTAFF AND STROKE TEAM DURING MORNING ROUNDS.   HPI:  64 year old male with a history of CAD s/p stents 2009 and 2019, HTN, DM, Carotid stenosis, Hypothyroidism, and ESRD on HD who presented to outside hospital with altered mental status as he was unresponsive, aphasic, and not moving any extremities. He was treated with tPA and admitted for observation.  Patient noted to have seizure during hospital course and was started on Keppra.  MRI brain was done which showed bilateral supratentorial and infratentorial infarcts.  Patient was undergoing dialysis when he developed A. fib and was intubated for airway protection.  Neurology following and multiple EEGs were done which showed patient in status epilepticus despite multiple antiepilectics.  Patient was transferred to [facility] for further management. Extubated on 12/21 and transferred to stroke unit.    SUBJECTIVE: No events overnight.  No new neurologic complaints.      acetaminophen    Suspension .. 650 milliGRAM(s) Oral every 4 hours PRN  albuterol/ipratropium for Nebulization 3 milliLiter(s) Nebulizer every 6 hours  apixaban 5 milliGRAM(s) Oral every 12 hours  atorvastatin 80 milliGRAM(s) Oral at bedtime  calcium acetate 667 milliGRAM(s) Oral three times a day with meals  carvedilol 3.125 milliGRAM(s) Oral every 12 hours  clopidogrel Tablet 75 milliGRAM(s) Oral daily  dextrose 40% Gel 15 Gram(s) Oral once PRN  dextrose 5%. 1000 milliLiter(s) IV Continuous <Continuous>  dextrose 50% Injectable 12.5 Gram(s) IV Push once  dextrose 50% Injectable 25 Gram(s) IV Push once  dextrose 50% Injectable 25 Gram(s) IV Push once  glucagon  Injectable 1 milliGRAM(s) IntraMuscular once PRN  guaiFENesin   Syrup  (Sugar-Free) 200 milliGRAM(s) Oral every 6 hours PRN  insulin lispro (HumaLOG) corrective regimen sliding scale   SubCutaneous every 6 hours  insulin NPH human recombinant 18 Unit(s) SubCutaneous every 6 hours  lacosamide Solution 50 milliGRAM(s) Oral two times a day  levETIRAcetam  Solution 250 milliGRAM(s) Oral two times a day  levETIRAcetam  Solution 125 milliGRAM(s) Oral daily  levothyroxine 50 MICROGram(s) Oral daily  melatonin 3 milliGRAM(s) Oral at bedtime  pantoprazole   Suspension 40 milliGRAM(s) Oral two times a day  polyethylene glycol 3350 17 Gram(s) Oral daily  senna Syrup 10 milliLiter(s) Oral two times a day PRN      PHYSICAL EXAM:   Vital Signs Last 24 Hrs  T(C): 36.6 (31 Dec 2019 06:00), Max: 36.9 (30 Dec 2019 20:00)  T(F): 97.9 (31 Dec 2019 06:00), Max: 98.4 (30 Dec 2019 20:00)  HR: 70 (31 Dec 2019 10:00) (70 - 86)  BP: 144/67 (31 Dec 2019 10:00) (112/57 - 158/81)  BP(mean): 73 (31 Dec 2019 08:00) (73 - 115)  RR: 9 (31 Dec 2019 10:00) (9 - 30)  SpO2: 97% (31 Dec 2019 10:00) (95% - 100%)    General: No acute distress  HEENT: VF appear full , right visual neglect   Abdomen: Soft, nontender, nondistended   Extremities: No edema    NEUROLOGICAL EXAM:  Mental status: Awake, alert, eyes open, speaks 1-3 words, attentive following commands. bradykinetic , hypophonic , recognizes arm , right hemineglect   Cranial Nerves: trace left facial droop, RHHA, no nystagmus, moderate to severe dysarthria,  tongue midline  Motor exam: LUE trace antigravity with drift < 3 sec down to bed, right arm trace movement distally in wrist at times but requires extensive prompting, RLE 2/5 /LLE 3/5   Sensation: Intact to light touch   Coordination/ Gait: No dysmetria, gait not tested    LABS:                        10.1   10.89 )-----------( 536      ( 31 Dec 2019 04:58 )             31.6    12-31    129<L>  |  91<L>  |  30<H>  ----------------------------<  157<H>  3.7   |  21<L>  |  4.29<H>    Ca    8.7      31 Dec 2019 04:58  Phos  5.6     12-30      Hemoglobin A1C, Whole Blood: 8.4 % (12-15 @ 11:00)  Hemoglobin A1C, Whole Blood: 8.2 % (12-14 @ 14:04)  Hemoglobin A1C, Whole Blood: 8.2 % (12-14 @ 12:05)      IMAGING: Reviewed by me.     CT Head No Cont (12.30.19)  Left basal ganglia lacunar infarct unchanged from 12/24/2019.    CT Head No Cont (12.24.19) Multifocal hypoattenuation involving the bilateral frontal and parietal and right occipital lobes is consistent with previously seen acute/subacute infarcts. No CT evidence for hemorrhagic transformation. No displaced calvarial fracture or appreciable scalp hematoma.    MRI Head No Cont (12.15.19) Numerous acute diffuse bilateral supratentorial and infratentorial infarcts.    CT Angio Head/Neck w/ IV Cont (12.14.19) Mild stenosis of the proximal left internal carotid artery of  up to 30% based on NASCET criteria. No stenosis of the cervical right   carotid artery based on NASCET criteria. Patent cervical vertebral arteries. No significant stenosis or occlusion of the major proximal arterial branches.

## 2019-12-31 NOTE — PROGRESS NOTE ADULT - SUBJECTIVE AND OBJECTIVE BOX
CC: f/u for possible pneumonia    Patient remains alert, no SOB, generalized weakness; tolerating tube feeds    REVIEW OF SYSTEMS:  All other review of systems negative (Comprehensive ROS)    Antimicrobials off  Medications Reviewed    Vital Signs Last 24 Hrs  T(F): 97.9 (31 Dec 2019 06:00), Max: 98.4 (30 Dec 2019 20:00)  HR: 76 (31 Dec 2019 16:00) (70 - 86)  BP: 115/57 (31 Dec 2019 16:00) (112/57 - 158/81)  BP(mean): 73 (31 Dec 2019 16:00) (73 - 115)  RR: 23 (31 Dec 2019 16:00) (9 - 30)  SpO2: 100% (31 Dec 2019 16:00) (95% - 100%)    PHYSICAL EXAM:  General: alert, no acute distress  Eyes:  anicteric, no conjunctival injection, no discharge  Oropharynx: no lesions or injection 	  Neck: supple, without adenopathy  R chest HD catheter site clean  Lungs: clear to auscultation  Heart: regular rate and rhythm; no murmur, rubs or gallops  Abdomen: soft, nondistended, nontender, G tube site clean  Skin: no lesions  Extremities: no edema  Neurologic: alert, limited strength of extremities    LAB RESULTS:                        10.1   10.89 )-----------( 536      ( 31 Dec 2019 04:58 )             31.6   12-31    129<L>  |  91<L>  |  30<H>  ----------------------------<  157<H>  3.7   |  21<L>  |  4.29<H>    Ca    8.7      31 Dec 2019 04:58  Phos  5.6     12-30    MICROBIOLOGY:  RECENT CULTURES:  Culture - Sputum . (12.24.19 @ 06:07)  Numerous Staphylococcus aureus  Moderate Enterobacter aerogenes  Moderate Enterobacter cloacae complex  Normal Respiratory Rachel present      RADIOLOGY REVIEWED:  Xray Chest 1 View- PORTABLE-Routine (12.29.19 @ 15:18) >  Study is slightly limited due to poor inspiration. The lungs are grossly clear.

## 2019-12-31 NOTE — DISCHARGE NOTE PROVIDER - CARE PROVIDER_API CALL
Antoni Martines (DO)  Neurology; Vascular Neurology  [address] [facility] [address]  [place] [address]  Phone: [phone]  Fax: [phone]  Follow Up Time: 1 month    Jimenez Witt (DO)  Gastroenterology; Internal Medicine  [address] [place] [address]  Phone: [phone]  Fax: [phone]  Follow Up Time: 1 month    Emil Barrientos)  Internal Medicine; Nephrology  [address], [address] [place] [address]  Phone: [phone]  Fax: [phone]  Follow Up Time: 1 month

## 2019-12-31 NOTE — PROGRESS NOTE ADULT - SUBJECTIVE AND OBJECTIVE BOX
Follow-up Pulm Progress Note    No new respiratory events overnight.  Denies SOB/CP.   100% on RA    Medications:  MEDICATIONS  (STANDING):  albuterol/ipratropium for Nebulization 3 milliLiter(s) Nebulizer every 6 hours  apixaban 5 milliGRAM(s) Oral every 12 hours  atorvastatin 80 milliGRAM(s) Oral at bedtime  calcium acetate 667 milliGRAM(s) Oral three times a day with meals  carvedilol 3.125 milliGRAM(s) Oral every 12 hours  clopidogrel Tablet 75 milliGRAM(s) Oral daily  dextrose 5%. 1000 milliLiter(s) (50 mL/Hr) IV Continuous <Continuous>  dextrose 50% Injectable 12.5 Gram(s) IV Push once  dextrose 50% Injectable 25 Gram(s) IV Push once  dextrose 50% Injectable 25 Gram(s) IV Push once  insulin lispro (HumaLOG) corrective regimen sliding scale   SubCutaneous every 6 hours  insulin NPH human recombinant 18 Unit(s) SubCutaneous every 6 hours  lacosamide Solution 50 milliGRAM(s) Oral two times a day  levETIRAcetam  Solution 250 milliGRAM(s) Oral two times a day  levETIRAcetam  Solution 125 milliGRAM(s) Oral daily  levothyroxine 50 MICROGram(s) Oral daily  melatonin 3 milliGRAM(s) Oral at bedtime  pantoprazole   Suspension 40 milliGRAM(s) Oral two times a day  polyethylene glycol 3350 17 Gram(s) Oral daily    MEDICATIONS  (PRN):  acetaminophen    Suspension .. 650 milliGRAM(s) Oral every 4 hours PRN Temp greater or equal to 38C (100.4F), Mild Pain (1 - 3), Moderate Pain (4 - 6), Severe Pain (7 - 10)  dextrose 40% Gel 15 Gram(s) Oral once PRN Blood Glucose LESS THAN 70 milliGRAM(s)/deciLiter  glucagon  Injectable 1 milliGRAM(s) IntraMuscular once PRN Glucose <70 milliGRAM(s)/deciLiter  guaiFENesin   Syrup  (Sugar-Free) 200 milliGRAM(s) Oral every 6 hours PRN Cough  senna Syrup 10 milliLiter(s) Oral two times a day PRN Constipation          Vital Signs Last 24 Hrs  T(C): 36.6 (31 Dec 2019 06:00), Max: 36.9 (30 Dec 2019 20:00)  T(F): 97.9 (31 Dec 2019 06:00), Max: 98.4 (30 Dec 2019 20:00)  HR: 70 (31 Dec 2019 10:00) (70 - 86)  BP: 144/67 (31 Dec 2019 10:00) (112/57 - 158/81)  BP(mean): 73 (31 Dec 2019 08:00) (73 - 115)  RR: 9 (31 Dec 2019 10:00) (9 - 30)  SpO2: 97% (31 Dec 2019 10:00) (95% - 100%) on RA          12-30 @ 07:01  -  12-31 @ 07:00  --------------------------------------------------------  IN: 1340 mL / OUT: 2750 mL / NET: -1410 mL          LABS:                        10.1   10.89 )-----------( 536      ( 31 Dec 2019 04:58 )             31.6     12-31    129<L>  |  91<L>  |  30<H>  ----------------------------<  157<H>  3.7   |  21<L>  |  4.29<H>    Ca    8.7      31 Dec 2019 04:58  Phos  5.6     12-30            CAPILLARY BLOOD GLUCOSE      POCT Blood Glucose.: 168 mg/dL (31 Dec 2019 11:47)          Physical Examination:  PULM: Decreased BS at bases  CVS: S1, S2 heard    RADIOLOGY REVIEWED  CT chest: < from: CT Chest w/ IV Cont (12.24.19 @ 14:37) >  CHEST:     LUNGS AND LARGE AIRWAYS: Patent central airways. Biapical atelectasis and/or scarring. Bibasilar atelectasis. Nonspecific right upper lobe pulmonary nodules measuring up to 3 mm (4:30).  PLEURA: No pleural effusion.  VESSELS: Right-sided central venous catheter with tipin the cavoatrial junction. Coronary artery and aortic calcifications.  HEART: Heart size is normal. No pericardial effusion.  MEDIASTINUM AND [place]: Subcentimeter mediastinal nodes.  CHEST WALL AND LOWER NECK: Within normal limits.    < end of copied text >

## 2019-12-31 NOTE — DISCHARGE NOTE PROVIDER - PROVIDER TOKENS
PROVIDER:[TOKEN:[[id number]],FOLLOWUP:[1 month]],PROVIDER:[TOKEN:[[id number]],FOLLOWUP:[1 month]],PROVIDER:[TOKEN:[[id number]],FOLLOWUP:[1 month]]

## 2019-12-31 NOTE — DISCHARGE NOTE PROVIDER - INSTRUCTIONS
NPO with tube feeds via PEG.  NEPRORTH, goal 40 mL/hr for 24 hours NPO with tube feeds via PEG.  NEPRORTH at 50 mL/hr for 24 hours

## 2019-12-31 NOTE — PROGRESS NOTE ADULT - ASSESSMENT
64 year old male that presented to outside hospital with altered mental status and treated with tPA, now transferred to [facility] with status epilepticus. 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     NEURO: Neurologically without acute change,  repeat CTH without acute change, Continue close monitoring for neurologic deterioration, permissive HTN to gradual normotension, continue on high dose statin. Found to be in status epilepticus at OSH-  on vimpat, Keppra (redose post HD). Carotid dopplers showed: R ICA 70-99% stenosis; L ICA 50-69% stenosis. , nsx recommended outpatient follow up,  Continue medical management. MRI Brain w/o contrast noted above. OT for splints. Physical therapy recommended OK.     ANTITHROMBOTIC THERAPY:  Plavix in setting of history of CAD and in setting of carotid stenosis. AC in setting of afib, d/w cardiology and nephrology no objection to NOAC.     PULMONARY: CXR clear on 12/23, protecting airway, saturating well. Continue aggressive suctioning-  coughing improved even on near goal TF per RN. MAINTAIN ASPIRATION PRECAUTIONS Pulmonary consult appreciated.     CARDIOVASCULAR: TTE Left ventricular ejection fraction, by visual estimation, is 60 to 65%. Trace mitral valve regurgitation. Cardiac monitoring shows paroxysmal atrial fibrillation.  s/p CARLA in June 2019, cardiology consult appreciated     SBP goal: Normotension, avoid hypotension    GASTROINTESTINAL:  dysphagia screen failed. S&S evaluation deferred further testing. s/p EGD on 12/28 that showed non-bleeding gastric ulcers- on protonix 40 mg PO BID. PEG placed. TF as recommended- tolerating at goal.      Diet: NPO with TF via PEG    RENAL: CKD stage V awaiting transplant on HD M/W/F, s/p HD on 12/30. Keppra dosed post HD days.  Off free water given hyponatremia.      Na Goal: Greater than 135     Miranda: No    HEMATOLOGY: H/H shows anemia now improved likely in setting of ESRD. FOBT negative, s/p 1 unit of PRBC on 12/26. s/p 1 unit on 12/27 prior to PEG.  LE doppler negative for DVT on 12/20.     DVT ppx: Heparin s.c    ID: afebrile, no leukocytosis Sputum cultures showed numerous Staph aureus, Moderate Enterobacter. Normal Respiratory giovanni. Monitor off abx for now. CT Chest/Abd/Pelvis on 12/24 negative for source of infection. ID recommends to monitor off antibiotics.     OTHER: Plan endorsed to patient and son. All questions and concerns addressed. Medicine following.     DISPOSITION: [facility]     CORE MEASURES:        Admission NIHSS:      TPA: YES at OSH      LDL/HDL: 52/56     Depression Screen: unable to assess     Statin Therapy: YES     Dysphagia Screen: FAIL     Smoking  NO      Afib YES     Stroke Education YES    Obtain screening lower extremity venous ultrasound in patients who meet 1 or more of the following criteria as patient is high risk for DVT/PE on admission:   [] History of DVT/PE  []Hypercoagulable states (Factor V Leiden, Cancer, OCP, etc. )  []Prolonged immobility (hemiplegia/hemiparesis/post operative or any other extended immobilization)  [] Transferred from outside facility (Rehab or Long term care)  [] Age </= to 50

## 2020-01-01 LAB
ANION GAP SERPL CALC-SCNC: 20 MMOL/L — HIGH (ref 5–17)
ANION GAP SERPL CALC-SCNC: 21 MMOL/L — HIGH (ref 5–17)
BUN SERPL-MCNC: 54 MG/DL — HIGH (ref 7–23)
BUN SERPL-MCNC: 67 MG/DL — HIGH (ref 7–23)
CALCIUM SERPL-MCNC: 10.1 MG/DL — SIGNIFICANT CHANGE UP (ref 8.4–10.5)
CALCIUM SERPL-MCNC: 9.1 MG/DL — SIGNIFICANT CHANGE UP (ref 8.4–10.5)
CHLORIDE SERPL-SCNC: 90 MMOL/L — LOW (ref 96–108)
CHLORIDE SERPL-SCNC: 91 MMOL/L — LOW (ref 96–108)
CO2 SERPL-SCNC: 21 MMOL/L — LOW (ref 22–31)
CO2 SERPL-SCNC: 22 MMOL/L — SIGNIFICANT CHANGE UP (ref 22–31)
CREAT SERPL-MCNC: 6.28 MG/DL — HIGH (ref 0.5–1.3)
CREAT SERPL-MCNC: 7.05 MG/DL — HIGH (ref 0.5–1.3)
GLUCOSE SERPL-MCNC: 110 MG/DL — HIGH (ref 70–99)
GLUCOSE SERPL-MCNC: 40 MG/DL — CRITICAL LOW (ref 70–99)
HCT VFR BLD CALC: 33.2 % — LOW (ref 39–50)
HCT VFR BLD CALC: 34.4 % — LOW (ref 39–50)
HCT VFR BLD CALC: 35.7 % — LOW (ref 39–50)
HGB BLD-MCNC: 10.8 G/DL — LOW (ref 13–17)
HGB BLD-MCNC: 10.8 G/DL — LOW (ref 13–17)
HGB BLD-MCNC: 11.4 G/DL — LOW (ref 13–17)
MCHC RBC-ENTMCNC: 28.4 PG — SIGNIFICANT CHANGE UP (ref 27–34)
MCHC RBC-ENTMCNC: 28.6 PG — SIGNIFICANT CHANGE UP (ref 27–34)
MCHC RBC-ENTMCNC: 29 PG — SIGNIFICANT CHANGE UP (ref 27–34)
MCHC RBC-ENTMCNC: 31.4 GM/DL — LOW (ref 32–36)
MCHC RBC-ENTMCNC: 31.9 GM/DL — LOW (ref 32–36)
MCHC RBC-ENTMCNC: 32.5 GM/DL — SIGNIFICANT CHANGE UP (ref 32–36)
MCV RBC AUTO: 89.2 FL — SIGNIFICANT CHANGE UP (ref 80–100)
MCV RBC AUTO: 89.5 FL — SIGNIFICANT CHANGE UP (ref 80–100)
MCV RBC AUTO: 90.5 FL — SIGNIFICANT CHANGE UP (ref 80–100)
NRBC # BLD: 0 /100 WBCS — SIGNIFICANT CHANGE UP (ref 0–0)
PLATELET # BLD AUTO: 556 K/UL — HIGH (ref 150–400)
PLATELET # BLD AUTO: 657 K/UL — HIGH (ref 150–400)
PLATELET # BLD AUTO: 696 K/UL — HIGH (ref 150–400)
POTASSIUM SERPL-MCNC: 4.2 MMOL/L — SIGNIFICANT CHANGE UP (ref 3.5–5.3)
POTASSIUM SERPL-MCNC: 4.7 MMOL/L — SIGNIFICANT CHANGE UP (ref 3.5–5.3)
POTASSIUM SERPL-SCNC: 4.2 MMOL/L — SIGNIFICANT CHANGE UP (ref 3.5–5.3)
POTASSIUM SERPL-SCNC: 4.7 MMOL/L — SIGNIFICANT CHANGE UP (ref 3.5–5.3)
RBC # BLD: 3.72 M/UL — LOW (ref 4.2–5.8)
RBC # BLD: 3.8 M/UL — LOW (ref 4.2–5.8)
RBC # BLD: 3.99 M/UL — LOW (ref 4.2–5.8)
RBC # FLD: 15 % — HIGH (ref 10.3–14.5)
RBC # FLD: 15.1 % — HIGH (ref 10.3–14.5)
RBC # FLD: 15.1 % — HIGH (ref 10.3–14.5)
SODIUM SERPL-SCNC: 132 MMOL/L — LOW (ref 135–145)
SODIUM SERPL-SCNC: 133 MMOL/L — LOW (ref 135–145)
WBC # BLD: 13.65 K/UL — HIGH (ref 3.8–10.5)
WBC # BLD: 26.19 K/UL — HIGH (ref 3.8–10.5)
WBC # BLD: 28.14 K/UL — HIGH (ref 3.8–10.5)
WBC # FLD AUTO: 13.65 K/UL — HIGH (ref 3.8–10.5)
WBC # FLD AUTO: 26.19 K/UL — HIGH (ref 3.8–10.5)
WBC # FLD AUTO: 28.14 K/UL — HIGH (ref 3.8–10.5)

## 2020-01-01 PROCEDURE — 99233 SBSQ HOSP IP/OBS HIGH 50: CPT

## 2020-01-01 PROCEDURE — 71045 X-RAY EXAM CHEST 1 VIEW: CPT | Mod: 26

## 2020-01-01 RX ORDER — SODIUM CHLORIDE 9 MG/ML
500 INJECTION INTRAMUSCULAR; INTRAVENOUS; SUBCUTANEOUS ONCE
Refills: 0 | Status: DISCONTINUED | OUTPATIENT
Start: 2020-01-01 | End: 2020-01-03

## 2020-01-01 RX ORDER — ACETAMINOPHEN 500 MG
1000 TABLET ORAL ONCE
Refills: 0 | Status: COMPLETED | OUTPATIENT
Start: 2020-01-01 | End: 2020-01-01

## 2020-01-01 RX ORDER — PIPERACILLIN AND TAZOBACTAM 4; .5 G/20ML; G/20ML
3.38 INJECTION, POWDER, LYOPHILIZED, FOR SOLUTION INTRAVENOUS EVERY 12 HOURS
Refills: 0 | Status: DISCONTINUED | OUTPATIENT
Start: 2020-01-01 | End: 2020-01-03

## 2020-01-01 RX ORDER — METOPROLOL TARTRATE 50 MG
5 TABLET ORAL ONCE
Refills: 0 | Status: COMPLETED | OUTPATIENT
Start: 2020-01-01 | End: 2020-01-01

## 2020-01-01 RX ORDER — ONDANSETRON 8 MG/1
4 TABLET, FILM COATED ORAL ONCE
Refills: 0 | Status: COMPLETED | OUTPATIENT
Start: 2020-01-01 | End: 2020-01-01

## 2020-01-01 RX ORDER — SODIUM CHLORIDE 9 MG/ML
250 INJECTION INTRAMUSCULAR; INTRAVENOUS; SUBCUTANEOUS ONCE
Refills: 0 | Status: COMPLETED | OUTPATIENT
Start: 2020-01-01 | End: 2020-01-01

## 2020-01-01 RX ORDER — MIDODRINE HYDROCHLORIDE 2.5 MG/1
10 TABLET ORAL ONCE
Refills: 0 | Status: COMPLETED | OUTPATIENT
Start: 2020-01-01 | End: 2020-01-01

## 2020-01-01 RX ORDER — DEXTROSE 50 % IN WATER 50 %
25 SYRINGE (ML) INTRAVENOUS ONCE
Refills: 0 | Status: COMPLETED | OUTPATIENT
Start: 2020-01-01 | End: 2020-01-01

## 2020-01-01 RX ORDER — PIPERACILLIN AND TAZOBACTAM 4; .5 G/20ML; G/20ML
3.38 INJECTION, POWDER, LYOPHILIZED, FOR SOLUTION INTRAVENOUS ONCE
Refills: 0 | Status: DISCONTINUED | OUTPATIENT
Start: 2020-01-01 | End: 2020-01-01

## 2020-01-01 RX ORDER — PIPERACILLIN AND TAZOBACTAM 4; .5 G/20ML; G/20ML
3.38 INJECTION, POWDER, LYOPHILIZED, FOR SOLUTION INTRAVENOUS ONCE
Refills: 0 | Status: COMPLETED | OUTPATIENT
Start: 2020-01-01 | End: 2020-01-01

## 2020-01-01 RX ORDER — VANCOMYCIN HCL 1 G
1000 VIAL (EA) INTRAVENOUS ONCE
Refills: 0 | Status: COMPLETED | OUTPATIENT
Start: 2020-01-01 | End: 2020-01-01

## 2020-01-01 RX ADMIN — CARVEDILOL PHOSPHATE 3.12 MILLIGRAM(S): 80 CAPSULE, EXTENDED RELEASE ORAL at 05:29

## 2020-01-01 RX ADMIN — Medication 667 MILLIGRAM(S): at 12:13

## 2020-01-01 RX ADMIN — SODIUM CHLORIDE 1000 MILLILITER(S): 9 INJECTION INTRAMUSCULAR; INTRAVENOUS; SUBCUTANEOUS at 22:26

## 2020-01-01 RX ADMIN — HUMAN INSULIN 18 UNIT(S): 100 INJECTION, SUSPENSION SUBCUTANEOUS at 06:22

## 2020-01-01 RX ADMIN — Medication 3 MILLILITER(S): at 17:25

## 2020-01-01 RX ADMIN — PANTOPRAZOLE SODIUM 40 MILLIGRAM(S): 20 TABLET, DELAYED RELEASE ORAL at 17:25

## 2020-01-01 RX ADMIN — LEVETIRACETAM 250 MILLIGRAM(S): 250 TABLET, FILM COATED ORAL at 05:31

## 2020-01-01 RX ADMIN — Medication 5 MILLIGRAM(S): at 18:17

## 2020-01-01 RX ADMIN — Medication 667 MILLIGRAM(S): at 05:31

## 2020-01-01 RX ADMIN — APIXABAN 5 MILLIGRAM(S): 2.5 TABLET, FILM COATED ORAL at 06:22

## 2020-01-01 RX ADMIN — Medication 25 GRAM(S): at 21:51

## 2020-01-01 RX ADMIN — Medication 3 MILLILITER(S): at 12:12

## 2020-01-01 RX ADMIN — Medication 2: at 12:14

## 2020-01-01 RX ADMIN — SODIUM CHLORIDE 1000 MILLILITER(S): 9 INJECTION INTRAMUSCULAR; INTRAVENOUS; SUBCUTANEOUS at 22:11

## 2020-01-01 RX ADMIN — Medication 250 MILLIGRAM(S): at 23:29

## 2020-01-01 RX ADMIN — ATORVASTATIN CALCIUM 80 MILLIGRAM(S): 80 TABLET, FILM COATED ORAL at 22:10

## 2020-01-01 RX ADMIN — LACOSAMIDE 50 MILLIGRAM(S): 50 TABLET ORAL at 17:25

## 2020-01-01 RX ADMIN — ONDANSETRON 4 MILLIGRAM(S): 8 TABLET, FILM COATED ORAL at 20:15

## 2020-01-01 RX ADMIN — PIPERACILLIN AND TAZOBACTAM 200 GRAM(S): 4; .5 INJECTION, POWDER, LYOPHILIZED, FOR SOLUTION INTRAVENOUS at 22:40

## 2020-01-01 RX ADMIN — HUMAN INSULIN 18 UNIT(S): 100 INJECTION, SUSPENSION SUBCUTANEOUS at 17:26

## 2020-01-01 RX ADMIN — POLYETHYLENE GLYCOL 3350 17 GRAM(S): 17 POWDER, FOR SOLUTION ORAL at 12:13

## 2020-01-01 RX ADMIN — Medication 3 MILLILITER(S): at 05:32

## 2020-01-01 RX ADMIN — LEVETIRACETAM 250 MILLIGRAM(S): 250 TABLET, FILM COATED ORAL at 17:25

## 2020-01-01 RX ADMIN — LACOSAMIDE 50 MILLIGRAM(S): 50 TABLET ORAL at 06:22

## 2020-01-01 RX ADMIN — APIXABAN 5 MILLIGRAM(S): 2.5 TABLET, FILM COATED ORAL at 17:25

## 2020-01-01 RX ADMIN — Medication 50 MICROGRAM(S): at 05:28

## 2020-01-01 RX ADMIN — CARVEDILOL PHOSPHATE 3.12 MILLIGRAM(S): 80 CAPSULE, EXTENDED RELEASE ORAL at 16:29

## 2020-01-01 RX ADMIN — Medication 3 MILLILITER(S): at 23:30

## 2020-01-01 RX ADMIN — PANTOPRAZOLE SODIUM 40 MILLIGRAM(S): 20 TABLET, DELAYED RELEASE ORAL at 05:38

## 2020-01-01 RX ADMIN — CLOPIDOGREL BISULFATE 75 MILLIGRAM(S): 75 TABLET, FILM COATED ORAL at 12:13

## 2020-01-01 RX ADMIN — Medication 3 MILLILITER(S): at 00:06

## 2020-01-01 RX ADMIN — HUMAN INSULIN 18 UNIT(S): 100 INJECTION, SUSPENSION SUBCUTANEOUS at 12:13

## 2020-01-01 RX ADMIN — Medication 667 MILLIGRAM(S): at 22:10

## 2020-01-01 RX ADMIN — MIDODRINE HYDROCHLORIDE 10 MILLIGRAM(S): 2.5 TABLET ORAL at 22:49

## 2020-01-01 RX ADMIN — Medication 2: at 17:26

## 2020-01-01 RX ADMIN — Medication 400 MILLIGRAM(S): at 20:34

## 2020-01-01 NOTE — PROGRESS NOTE ADULT - SUBJECTIVE AND OBJECTIVE BOX
INTERVAL HPI/OVERNIGHT EVENTS:    no new events     MEDICATIONS  (STANDING):  albuterol/ipratropium for Nebulization 3 milliLiter(s) Nebulizer every 6 hours  apixaban 5 milliGRAM(s) Oral every 12 hours  atorvastatin 80 milliGRAM(s) Oral at bedtime  calcium acetate 667 milliGRAM(s) Oral three times a day with meals  carvedilol 3.125 milliGRAM(s) Oral every 12 hours  clopidogrel Tablet 75 milliGRAM(s) Oral daily  dextrose 5%. 1000 milliLiter(s) (50 mL/Hr) IV Continuous <Continuous>  dextrose 50% Injectable 12.5 Gram(s) IV Push once  dextrose 50% Injectable 25 Gram(s) IV Push once  dextrose 50% Injectable 25 Gram(s) IV Push once  insulin lispro (HumaLOG) corrective regimen sliding scale   SubCutaneous every 6 hours  insulin NPH human recombinant 18 Unit(s) SubCutaneous every 6 hours  lacosamide Solution 50 milliGRAM(s) Oral two times a day  levETIRAcetam  Solution 250 milliGRAM(s) Oral two times a day  levETIRAcetam  Solution 125 milliGRAM(s) Oral daily  levothyroxine 50 MICROGram(s) Oral daily  melatonin 3 milliGRAM(s) Oral at bedtime  pantoprazole   Suspension 40 milliGRAM(s) Oral two times a day  polyethylene glycol 3350 17 Gram(s) Oral daily    MEDICATIONS  (PRN):  acetaminophen    Suspension .. 650 milliGRAM(s) Oral every 4 hours PRN Temp greater or equal to 38C (100.4F), Mild Pain (1 - 3), Moderate Pain (4 - 6), Severe Pain (7 - 10)  dextrose 40% Gel 15 Gram(s) Oral once PRN Blood Glucose LESS THAN 70 milliGRAM(s)/deciLiter  glucagon  Injectable 1 milliGRAM(s) IntraMuscular once PRN Glucose <70 milliGRAM(s)/deciLiter  guaiFENesin   Syrup  (Sugar-Free) 200 milliGRAM(s) Oral every 6 hours PRN Cough  senna Syrup 10 milliLiter(s) Oral two times a day PRN Constipation      Allergies    No Known Allergies    Intolerances        Review of Systems:    General:  No wt loss, fevers, chills, night sweats,fatigue,   Eyes:  Good vision, no reported pain  ENT:  No sore throat, pain, runny nose, dysphagia  CV:  No pain, palpitations, hypo/hypertension  Resp:  No dyspnea, cough, tachypnea, wheezing  GI:  No pain, No nausea, No vomiting, No diarrhea, No constipation, No weight loss, No fever, No pruritis, No rectal bleeding, No melena, No dysphagia  :  No pain, bleeding, incontinence, nocturia  Muscle:  No pain, weakness  Neuro:  No weakness, tingling, memory problems  Psych:  No fatigue, insomnia, mood problems, depression  Endocrine:  No polyuria, polydypsia, cold/heat intolerance  Heme:  No petechiae, ecchymosis, easy bruisability  Skin:  No rash, tattoos, scars, edema      Vital Signs Last 24 Hrs  T(C): 36.6 (31 Dec 2019 06:00), Max: 36.9 (30 Dec 2019 20:00)  T(F): 97.9 (31 Dec 2019 06:00), Max: 98.4 (30 Dec 2019 20:00)  HR: 70 (31 Dec 2019 10:00) (70 - 86)  BP: 144/67 (31 Dec 2019 10:00) (112/57 - 158/81)  BP(mean): 73 (31 Dec 2019 08:00) (73 - 115)  RR: 9 (31 Dec 2019 10:00) (9 - 30)  SpO2: 97% (31 Dec 2019 10:00) (95% - 100%)    PHYSICAL EXAM:    ill appearing  frail  non toxic  soft, nt +peg   no edema      LABS:                        10.1   10.89 )-----------( 536      ( 31 Dec 2019 04:58 )             31.6     12-31    129<L>  |  91<L>  |  30<H>  ----------------------------<  157<H>  3.7   |  21<L>  |  4.29<H>    Ca    8.7      31 Dec 2019 04:58  Phos  5.6     12-30            RADIOLOGY & ADDITIONAL TESTS:

## 2020-01-01 NOTE — PROGRESS NOTE ADULT - SUBJECTIVE AND OBJECTIVE BOX
THE PATIENT WAS SEEN AND EXAMINED BY ME WITH THE HOUSESTAFF AND STROKE TEAM DURING MORNING ROUNDS.   HPI:  64 year old male with a history of CAD s/p stents 2009 and 2019, HTN, DM, Carotid stenosis, Hypothyroidism, and ESRD on HD who presented to outside hospital with altered mental status as he was unresponsive, aphasic, and not moving any extremities. He was treated with tPA and admitted for observation.  Patient noted to have seizure during hospital course and was started on Keppra.  MRI brain was done which showed bilateral supratentorial and infratentorial infarcts.  Patient was undergoing dialysis when he developed A. fib and was intubated for airway protection.  Neurology following and multiple EEGs were done which showed patient in status epilepticus despite multiple antiepilectics.  Patient was transferred to [facility] for further management. Extubated on 12/21 and transferred to stroke unit.      SUBJECTIVE: No events overnight.  No new neurologic complaints.  per family better today, improved cough.    acetaminophen    Suspension .. 650 milliGRAM(s) Oral every 4 hours PRN  albuterol/ipratropium for Nebulization 3 milliLiter(s) Nebulizer every 6 hours  apixaban 5 milliGRAM(s) Oral every 12 hours  atorvastatin 80 milliGRAM(s) Oral at bedtime  calcium acetate 667 milliGRAM(s) Oral three times a day with meals  carvedilol 3.125 milliGRAM(s) Oral every 12 hours  clopidogrel Tablet 75 milliGRAM(s) Oral daily  dextrose 40% Gel 15 Gram(s) Oral once PRN  dextrose 5%. 1000 milliLiter(s) IV Continuous <Continuous>  dextrose 50% Injectable 12.5 Gram(s) IV Push once  dextrose 50% Injectable 25 Gram(s) IV Push once  dextrose 50% Injectable 25 Gram(s) IV Push once  glucagon  Injectable 1 milliGRAM(s) IntraMuscular once PRN  guaiFENesin   Syrup  (Sugar-Free) 200 milliGRAM(s) Oral every 6 hours PRN  insulin lispro (HumaLOG) corrective regimen sliding scale   SubCutaneous every 6 hours  insulin NPH human recombinant 18 Unit(s) SubCutaneous every 6 hours  lacosamide Solution 50 milliGRAM(s) Oral two times a day  levETIRAcetam  Solution 250 milliGRAM(s) Oral two times a day  levETIRAcetam  Solution 125 milliGRAM(s) Oral daily  levothyroxine 50 MICROGram(s) Oral daily  melatonin 3 milliGRAM(s) Oral at bedtime  pantoprazole   Suspension 40 milliGRAM(s) Oral two times a day  polyethylene glycol 3350 17 Gram(s) Oral daily  senna Syrup 10 milliLiter(s) Oral two times a day PRN      PHYSICAL EXAM:   Vital Signs Last 24 Hrs  T(C): 36.7 (01 Jan 2020 08:00), Max: 36.7 (31 Dec 2019 20:00)  T(F): 98 (01 Jan 2020 08:00), Max: 98 (31 Dec 2019 20:00)  HR: 81 (01 Jan 2020 12:00) (71 - 81)  BP: 146/71 (01 Jan 2020 12:00) (115/57 - 164/74)  BP(mean): 92 (01 Jan 2020 12:00) (73 - 100)  RR: 16 (01 Jan 2020 12:00) (13 - 23)  SpO2: 100% (01 Jan 2020 12:00) (95% - 100%)      General: No acute distress  HEENT: VF appear full , right visual neglect   Abdomen: Soft, nontender, nondistended   Extremities: No edema    NEUROLOGICAL EXAM:  Mental status: Awake, alert, eyes open, speaks 1-3 words, attentive following commands. bradykinetic , hypophonic , recognizes arm , right hemineglect   Cranial Nerves: trace left facial droop, RHHA, no nystagmus, moderate to severe dysarthria,  tongue midline  Motor exam: LUE trace antigravity with drift < 3 sec down to bed, right arm trace movement distally in wrist at times , requires extensive prompting, RLE 2/5 /LLE 3/5   Sensation: Intact to light touch   Coordination/ Gait: No dysmetria, gait not tested      LABS:                        10.8   13.65 )-----------( 556      ( 01 Jan 2020 06:00 )             34.4    01-01    133<L>  |  91<L>  |  54<H>  ----------------------------<  110<H>  4.2   |  22  |  6.28<H>    Ca    9.1      01 Jan 2020 06:00      Hemoglobin A1C, Whole Blood: 8.4 % (12-15 @ 11:00)  Hemoglobin A1C, Whole Blood: 8.2 % (12-14 @ 14:04)  Hemoglobin A1C, Whole Blood: 8.2 % (12-14 @ 12:05)      IMAGING: Reviewed by me.     CT Head No Cont (12.30.19)  Left basal ganglia lacunar infarct unchanged from 12/24/2019.    CT Head No Cont (12.24.19) Multifocal hypoattenuation involving the bilateral frontal and parietal and right occipital lobes is consistent with previously seen acute/subacute infarcts. No CT evidence for hemorrhagic transformation. No displaced calvarial fracture or appreciable scalp hematoma.    MRI Head No Cont (12.15.19) Numerous acute diffuse bilateral supratentorial and infratentorial infarcts.    CT Angio Head/Neck w/ IV Cont (12.14.19) Mild stenosis of the proximal left internal carotid artery of  up to 30% based on NASCET criteria. No stenosis of the cervical right   carotid artery based on NASCET criteria. Patent cervical vertebral arteries. No significant stenosis or occlusion of the major proximal arterial branches.

## 2020-01-01 NOTE — CONSULT NOTE ADULT - ATTENDING COMMENTS
64 year old male with a history of CAD s/p stents 2009 and 2019, HTN, DM, Carotid stenosis, Hypothyroidism, and ESRD on HD who presented to outside hospital with altered mental status as he was unresponsive, aphasic, and not moving any extremities. He was treated with tPA and admitted for observation. Last HD was on Monday 12/30. Now hypotension 2/2 sepsis, likely aspiration but not certain.  he has sings of pressitena bet blocker effect given lack of tachycardia during hypotension that is likely blunting his normal response.   Agree with above would continu with midodrine 10q8, vanc and zosyn.    Blood cultures  reconsult if decompensates further.
Agree with plan as outlined above.  Discussed with family at bedside
New embolic CVA and status epilepticus in neuro ICU, but pt now extubated and stable for transfer to floors. Continue antiepileptics, f/u neuro, neurosurgery.  MSSA found in sputum, fever on 24th, ID recommending cefepime.  Now afebrile.  Plan for possible peg, GI consulted.  Follow up iron studies for anemia, and f/u posttransfusion CBC today, 1 unit PRBC today.  Per neuro, will stay off AC for A fib for now.    Addi Winn, Attending Physician

## 2020-01-01 NOTE — CONSULT NOTE ADULT - SUBJECTIVE AND OBJECTIVE BOX
CHIEF COMPLAINT: Patient is a 64y old  Male who presents with a chief complaint of Seizures (01 Jan 2020 14:59)    HPI:  Patient is a 64 year old male with a history of CAD s/p stents 2009 and 2019, HTN, DM, Carotid stenosis, Hypothyroidism, and ESRD on HD who presented to outside hospital with altered mental status as he was unresponsive, aphasic, and not moving any extremities.  Was treated with tPA and admitted for observation.  Patient noted to have seizure during hospital course and was started on Keppra.  MRI brain was done which showed bilateral supratentorial and infratentorial infarcts.  Patient was undergoing dialysis when he developed A. fib and was intubated for airway protection.  Neurology following and multiple EEGs were done which showed patient in status epilepticus despite multiple antiepilectics.  Patient now transferred to [facility] for further management. (19 Dec 2019 16:37)  Pt with uptrending WBC since yesterday, today febrile to 100.7 and acutely hypotensive to 80s/50s. MICU c/s for hypotension.     PAST MEDICAL & SURGICAL HISTORY:  Hypothyroidism  CRI (Chronic Renal Insufficiency)  CAD (Coronary Artery Disease): with Stents in 06/2009 and 6/2019  Dyslipidemia  Diabetes  Hypertension  History of insertion of stent into coronary artery bypass graft: 2009 and 2019      FAMILY HISTORY:      SOCIAL HISTORY:  Smoking: __ packs x ___ years  EtOH Use:  Marital Status:  Occupation:  Recent Travel:  Country of Birth:  Advance Directives:    Allergies    No Known Allergies    Intolerances        HOME MEDICATIONS:    REVIEW OF SYSTEMS:  Constitutional:   Eyes:  ENT:  CV:  Resp:  GI:  :  MSK:  Integumentary:  Neurological:  Psychiatric:  Endocrine:  Hematologic/Lymphatic:  Allergic/Immunologic:  [ ] All other systems negative  [X] Unable to assess ROS because AAO x 1     OBJECTIVE:  ICU Vital Signs Last 24 Hrs  T(C): 36.7 (01 Jan 2020 08:00), Max: 36.7 (01 Jan 2020 08:00)  T(F): 98 (01 Jan 2020 08:00), Max: 98 (01 Jan 2020 08:00)  HR: 96 (01 Jan 2020 18:35) (75 - 105)  BP: 150/73 (01 Jan 2020 18:35) (121/57 - 193/95)  BP(mean): 94 (01 Jan 2020 18:35) (76 - 121)  ABP: --  ABP(mean): --  RR: 20 (01 Jan 2020 18:35) (13 - 22)  SpO2: 97% (01 Jan 2020 18:35) (95% - 100%)        12-31 @ 07:01  -  01-01 @ 07:00  --------------------------------------------------------  IN: 400 mL / OUT: 0 mL / NET: 400 mL      CAPILLARY BLOOD GLUCOSE      POCT Blood Glucose.: 179 mg/dL (01 Jan 2020 22:00)      PHYSICAL EXAM:  GENERAL: NAD, thin, ill appearing  HEAD:  Atraumatic, Normocephalic  EYES: EOMI, PERRLA, conjunctiva and sclera clear  NECK: Supple, No JVD  CHEST/LUNG: Clear to auscultation bilaterally; No wheeze  HEART: Regular rate and rhythm; No murmurs, rubs, or gallops  ABDOMEN: Soft, initial voluntary guarding in the RLQ, but resolved, ND. PEG in place mid epigastric, dressing c/d/i  EXTREMITIES:  2+ Peripheral Pulses, No clubbing, cyanosis, or edema  PSYCH: AAOx1  NEUROLOGY: non-focal  SKIN: No rashes or lesions    HOSPITAL MEDICATIONS:  MEDICATIONS  (STANDING):  albuterol/ipratropium for Nebulization 3 milliLiter(s) Nebulizer every 6 hours  apixaban 5 milliGRAM(s) Oral every 12 hours  atorvastatin 80 milliGRAM(s) Oral at bedtime  calcium acetate 667 milliGRAM(s) Oral three times a day with meals  carvedilol 3.125 milliGRAM(s) Oral every 12 hours  clopidogrel Tablet 75 milliGRAM(s) Oral daily  dextrose 5%. 1000 milliLiter(s) (50 mL/Hr) IV Continuous <Continuous>  dextrose 50% Injectable 12.5 Gram(s) IV Push once  dextrose 50% Injectable 25 Gram(s) IV Push once  dextrose 50% Injectable 25 Gram(s) IV Push once  insulin lispro (HumaLOG) corrective regimen sliding scale   SubCutaneous every 6 hours  insulin NPH human recombinant 18 Unit(s) SubCutaneous every 6 hours  lacosamide Solution 50 milliGRAM(s) Oral two times a day  levETIRAcetam  Solution 250 milliGRAM(s) Oral two times a day  levETIRAcetam  Solution 125 milliGRAM(s) Oral daily  levothyroxine 50 MICROGram(s) Oral daily  melatonin 3 milliGRAM(s) Oral at bedtime  pantoprazole   Suspension 40 milliGRAM(s) Oral two times a day  piperacillin/tazobactam IVPB. 3.375 Gram(s) IV Intermittent once  piperacillin/tazobactam IVPB.. 3.375 Gram(s) IV Intermittent every 12 hours  polyethylene glycol 3350 17 Gram(s) Oral daily  sodium chloride 0.9% Bolus 500 milliLiter(s) IV Bolus once  vancomycin  IVPB 1000 milliGRAM(s) IV Intermittent once    MEDICATIONS  (PRN):  acetaminophen    Suspension .. 650 milliGRAM(s) Oral every 4 hours PRN Temp greater or equal to 38C (100.4F), Mild Pain (1 - 3), Moderate Pain (4 - 6), Severe Pain (7 - 10)  dextrose 40% Gel 15 Gram(s) Oral once PRN Blood Glucose LESS THAN 70 milliGRAM(s)/deciLiter  glucagon  Injectable 1 milliGRAM(s) IntraMuscular once PRN Glucose <70 milliGRAM(s)/deciLiter  guaiFENesin   Syrup  (Sugar-Free) 200 milliGRAM(s) Oral every 6 hours PRN Cough  senna Syrup 10 milliLiter(s) Oral two times a day PRN Constipation      LABS:                        10.8   28.14 )-----------( 657      ( 01 Jan 2020 22:01 )             33.2     01-01    132<L>  |  90<L>  |  67<H>  ----------------------------<  40<LL>  4.7   |  21<L>  |  7.05<H>    Ca    10.1      01 Jan 2020 20:58                MICROBIOLOGY:     RADIOLOGY:  [ ] Reviewed and interpreted by me    EKG:

## 2020-01-01 NOTE — PROGRESS NOTE ADULT - SUBJECTIVE AND OBJECTIVE BOX
Dr. Barrientos  Office [phone]  Cell [phone]  Karolina HAAS  Cell [phone]      Patient is a 64y old  Male who presents with a chief complaint of Seizures (01 Jan 2020 14:59)      Patient seen and examined at bedside. No apparent distress    VITALS:  T(F): 98 (01-01-20 @ 08:00), Max: 98 (01-01-20 @ 08:00)  HR: 96 (01-01-20 @ 18:35)  BP: 150/73 (01-01-20 @ 18:35)  RR: 20 (01-01-20 @ 18:35)  SpO2: 97% (01-01-20 @ 18:35)  Wt(kg): --    12-31 @ 07:01  -  01-01 @ 07:00  --------------------------------------------------------  IN: 400 mL / OUT: 0 mL / NET: 400 mL          PHYSICAL EXAM:  Constitutional: NAD  Neck: No JVD  Respiratory: CTAB, no wheezes, rales or rhonchi  Cardiovascular: S1, S2, RRR  Gastrointestinal: BS+, soft, NT/ND  Extremities: No peripheral edema    Hospital Medications:   MEDICATIONS  (STANDING):  albuterol/ipratropium for Nebulization 3 milliLiter(s) Nebulizer every 6 hours  apixaban 5 milliGRAM(s) Oral every 12 hours  atorvastatin 80 milliGRAM(s) Oral at bedtime  calcium acetate 667 milliGRAM(s) Oral three times a day with meals  carvedilol 3.125 milliGRAM(s) Oral every 12 hours  clopidogrel Tablet 75 milliGRAM(s) Oral daily  dextrose 5%. 1000 milliLiter(s) (50 mL/Hr) IV Continuous <Continuous>  dextrose 50% Injectable 12.5 Gram(s) IV Push once  dextrose 50% Injectable 25 Gram(s) IV Push once  dextrose 50% Injectable 25 Gram(s) IV Push once  insulin lispro (HumaLOG) corrective regimen sliding scale   SubCutaneous every 6 hours  insulin NPH human recombinant 18 Unit(s) SubCutaneous every 6 hours  lacosamide Solution 50 milliGRAM(s) Oral two times a day  levETIRAcetam  Solution 250 milliGRAM(s) Oral two times a day  levETIRAcetam  Solution 125 milliGRAM(s) Oral daily  levothyroxine 50 MICROGram(s) Oral daily  melatonin 3 milliGRAM(s) Oral at bedtime  pantoprazole   Suspension 40 milliGRAM(s) Oral two times a day  polyethylene glycol 3350 17 Gram(s) Oral daily  sodium chloride 0.9% Bolus 250 milliLiter(s) IV Bolus once      LABS:  01-01    132<L>  |  90<L>  |  67<H>  ----------------------------<  40<LL>  4.7   |  21<L>  |  7.05<H>    Ca    10.1      01 Jan 2020 20:58      Creatinine Trend: 7.05 <--, 6.28 <--, 4.29 <--, 6.26 <--, 4.57 <--, 6.41 <--, 3.75 <--, 7.60 <--                                10.8   28.14 )-----------( 657      ( 01 Jan 2020 22:01 )             33.2     Urine Studies:  Urinalysis - [12-24-19 @ 01:57]      Color Yellow / Appearance Clear / SG 1.017 / pH 6.5      Gluc 500 mg/dL / Ketone Negative  / Bili Negative / Urobili Negative       Blood Small / Protein 300 mg/dL / Leuk Est Negative / Nitrite Negative      RBC 3 / WBC 4 / Hyaline 1 / Gran  / Sq Epi  / Non Sq Epi 1 / Bacteria Negative      Iron 45, TIBC 145, %sat 31      [12-26-19 @ 23:24]  Ferritin 1369      [12-26-19 @ 23:24]  PTH -- (Ca 6.9)      [06-05-19 @ 08:14]   562  HbA1c 8.4      [12-15-19 @ 11:00]  TSH 0.88      [12-19-19 @ 10:33]  Lipid: chol 134, , HDL 56, LDL 52      [12-15-19 @ 10:56]    HBsAb >1000.0      [12-14-19 @ 23:48]  HBsAg Nonreact      [12-14-19 @ 23:48]  HCV 0.15, Nonreact      [12-14-19 @ 23:48]      RADIOLOGY & ADDITIONAL STUDIES:

## 2020-01-01 NOTE — PROVIDER CONTACT NOTE (OTHER) - BACKGROUND
PMHx Dm, HTN, CAD s/p stents 2009 & 2019, hypothyroidism, ESRD on HD. came in with altered mental status, unresponsive, aphasic, not moving extremities. CTH L basal ganglia infarct

## 2020-01-01 NOTE — CONSULT NOTE ADULT - ASSESSMENT
64 year old male with a history of CAD s/p stents 2009 and 2019, HTN, DM, Carotid stenosis, Hypothyroidism, and ESRD on HD who presented to outside hospital with altered mental status as he was unresponsive, aphasic, and not moving any extremities. He was treated with tPA and admitted for observation. Last HD was on Monday 12/30. Now hypotension 2/2 sepsis, unclear source. MICU c/s.     #Hypotension  BPs 80s/50s (pt usually hypertensive, did receive coreg and metoprolol today)   Febrile w/ uptrending leukocytosis, source likely aspiration given episode of vomiting, hx of stroke  S/p 750 cc IVF, with response of BP to 120s/70s.   CXR w/ no focal opacities   POCUS A-line predominant    Recommendations:   Pan Cx (BCx x 2, U/A, UCx, sputum Cx)  Can send procalcitonin in the AM   Vancomycin 1 g stat (then dose by level w/ HD), Zosyn 3.375 q 12  Midodrine 10 mg stat     Will d/w attending, Dr. Sinclair 64 year old male with a history of CAD s/p stents 2009 and 2019, HTN, DM, Carotid stenosis, Hypothyroidism, and ESRD on HD who presented to outside hospital with altered mental status as he was unresponsive, aphasic, and not moving any extremities. He was treated with tPA and admitted for observation. Last HD was on Monday 12/30. Now hypotension 2/2 sepsis, unclear source. MICU c/s.     #Hypotension  BPs 80s/50s (pt usually hypertensive, did receive coreg and metoprolol today)   Febrile w/ uptrending leukocytosis, source likely aspiration given episode of vomiting, hx of stroke  S/p 750 cc IVF, with response of BP to 120s/70s.   CXR w/ no focal opacities   POCUS A-line predominant    Recommendations:   Pan Cx (BCx x 2, U/A, UCx, sputum Cx)  Hold tube feeds for now  Hold all anti-hypertensives  Can send procalcitonin in the AM   Vancomycin 1 g stat (then dose by level w/ HD), Zosyn 3.375 q 12  Midodrine 10 mg stat     Will d/w attending, Dr. Sinclair 64 year old male with a history of CAD s/p stents 2009 and 2019, HTN, DM, Carotid stenosis, Hypothyroidism, and ESRD on HD who presented to outside hospital with altered mental status as he was unresponsive, aphasic, and not moving any extremities. He was treated with tPA and admitted for observation. Last HD was on Monday 12/30. Now hypotension 2/2 sepsis, unclear source. MICU c/s.     #Hypotension  BPs 80s/50s (pt usually hypertensive, did receive coreg and metoprolol today)   Febrile w/ uptrending leukocytosis, source likely aspiration given episode of vomiting, hx of stroke  S/p 750 cc IVF, with response of BP to 120s/70s.   CXR w/ no focal opacities   POCUS A-line predominant    Recommendations:   Pan Cx (BCx x 2, U/A, UCx, sputum Cx)  Hold tube feeds for now  Hold all anti-hypertensives  Can send procalcitonin in the AM   Vancomycin 1 g stat (then dose by level w/ HD), Zosyn 3.375 q 12  Midodrine 10 mg q8    D/w attending, Dr. Sinclair      Not a MICU candidate, please reconsult as necessary

## 2020-01-01 NOTE — CHART NOTE - NSCHARTNOTEFT_GEN_A_CORE
64 year old male with a history of CAD s/p stents 2009 and 2019, HTN, DM, Carotid stenosis, Hypothyroidism, and ESRD on HD who presented to outside hospital with altered mental status as he was unresponsive, aphasic, and not moving any extremities. He was treated with tPA and admitted for observation. Last HD was on Monday 12/30.      Was notified by RN pt was nauseous and zofran was given; tube feeds placed on hold. Pt's fever spiked to 100.7 and CBC/BMP/UA/CXray/Blood cultures x2/Tylenol was drawn/administered. Notifed for critical value of Blood Glucose of 36 and 25IV dextrose was given. Pt's blood pressure dropped to SBP of 81/46. Was noted Pt received standing Croeg along with one time bp tx with lopressor. Most likely due to the major decrease in pressure. 250cc bolus was administered with a slight increase to 91/44. Neurologically pt is stable, follows commands, LUE trace antigravity with drift < 3 sec down to bed, requires extensive prompting, RLE 2/5 /LLE 3/5. Pressure decreased back down to 85/45.MICU was consulted for an increase in WBC from 13.65 to 28.14, fever and hypotensive. Recommended: Zosyn stat 3.375 continued with q12, Vanco 1g one dose and 500cc bolus and Midorine 10 via PEG. Will continue to monitor closely. 64 year old male with a history of CAD s/p stents 2009 and 2019, HTN, DM, Carotid stenosis, Hypothyroidism, and ESRD on HD (last given on Monday 12/30) who presented to outside hospital with altered mental status as he was unresponsive, aphasic, and not moving any extremities. He was treated with tPA and admitted for observation.       Was notified by RN pt was nauseous and zofran was given; tube feeds placed on hold. Pt's fever spiked to 100.7 and CBC/BMP/UA/CXray/Blood cultures x2/Tylenol was drawn/administered. Notifed for critical value of Blood Glucose of 36 and 25IV dextrose was given. Pt's blood pressure dropped to SBP of 81/46. Was noted Pt received standing Croeg along with one time bp tx with lopressor. Most likely due to the major decrease in pressure. 250cc bolus was administered with a slight increase to 91/44. Neurologically pt is stable, follows commands, LUE trace antigravity with drift < 3 sec down to bed, requires extensive prompting, RLE 2/5 /LLE 3/5. Pressure decreased back down to 85/45.MICU was consulted for an increase in WBC from 13.65 to 28.14, fever and hypotensive. Recommended: Zosyn stat 3.375 continued with q12, Vanco 1g one dose and 500cc bolus and Midorine 10 via PEG. Will continue to monitor closely. 64 year old male with a history of CAD s/p stents 2009 and 2019, HTN, DM, Carotid stenosis, Hypothyroidism, and ESRD on HD (last received on Monday 12/30) who presented to outside hospital with altered mental status as he was unresponsive, aphasic, and not moving any extremities. He was treated with tPA and admitted for observation.       Was notified by RN pt was nauseous and zofran was given; tube feeds placed on hold. Pt's fever spiked to 100.7 and CBC/BMP/UA/CXray/Blood cultures x2/Tylenol was drawn/administered. Notifed for critical value of Blood Glucose of 36 and 25IV dextrose was given. Pt's blood pressure dropped to SBP of 81/46. Was noted Pt received standing Croeg along with one time bp tx with lopressor. Most likely due to the major decrease in pressure. 250cc bolus was administered with a slight increase to 91/44. Neurologically pt is stable, follows commands, LUE trace antigravity with drift < 3 sec down to bed, requires extensive prompting, RLE 2/5 /LLE 3/5. Pressure decreased back down to 85/45.MICU was consulted for an increase in WBC from 13.65 to 28.14, fever and hypotensive. Recommended: Zosyn stat 3.375 continued with q12, Vanco 1g one dose and 500cc bolus and Midorine 10 via PEG. Procalcitonin in AM. Will continue to monitor closely. 64 year old male with a history of CAD s/p stents 2009 and 2019, HTN, DM, Carotid stenosis, Hypothyroidism, and ESRD on HD (last received on Monday 12/30) who presented to outside hospital with altered mental status as he was unresponsive, aphasic, and not moving any extremities. He was treated with tPA and admitted for observation.       Was notified by RN pt was nauseous and zofran was given; tube feeds placed on hold. Pt's fever spiked to 100.7 and CBC/BMP/UA/CXray/Blood cultures x2/Tylenol was drawn/administered. Notifed for critical value of Blood Glucose of 36 and 25IV dextrose was given. Pt's blood pressure dropped to SBP of 81/46. Was noted Pt received standing Croeg along with one time bp tx with lopressor. Most likely due to the major decrease in pressure. 250cc bolus was administered with a slight increase to 91/44. Neurologically pt is stable, follows commands, LUE trace antigravity with drift < 3 sec down to bed, requires extensive prompting, RLE 2/5 /LLE 3/5. Pressure decreased back down to 85/45.MICU was consulted for an increase in WBC from 13.65 to 28.14, fever and hypotensive. Recommended: Zosyn stat 3.375 continued with q12, Vanco 1g one dose and 500cc bolus and Midorine 10 via PEG. Procalcitonin in AM. Will continue to monitor closely.    Please consider ID reconsult  in AM. 64 year old male with a history of CAD s/p stents 2009 and 2019, HTN, DM, Carotid stenosis, Hypothyroidism, and ESRD on HD (last received on Monday 12/30) who presented to outside hospital with altered mental status as he was unresponsive, aphasic, and not moving any extremities. He was treated with tPA and admitted for observation.       Was notified by RN pt was nauseous and zofran was given; tube feeds placed on hold. Pt's fever spiked to 100.7 and CBC/BMP/UA/CXray/Blood cultures x2/Tylenol was drawn/administered. Notifed for critical value of Blood Glucose of 36 and 25IV dextrose was given. Pt's blood pressure dropped to SBP of 81/46. Was noted Pt received standing Croeg along with one time bp tx with lopressor. Most likely due to the major decrease in pressure. 250cc bolus was administered with a slight increase to 91/44. Neurologically pt is stable, follows commands, LUE trace antigravity with drift < 3 sec down to bed, requires extensive prompting, RLE 2/5 /LLE 3/5. Pressure decreased back down to 85/45.MICU was consulted for an increase in WBC from 13.65 to 28.14, fever and hypotensive. Recommended: Zosyn stat 3.375 continued with q12, Vanco 1g one dose and 500cc bolus and Midorine 10 via PEG. Procalcitonin in AM. Will continue to monitor closely.    Please hold anithypertenisve meds and consider ID reconsult  in AM. 64 year old male with a history of CAD s/p stents 2009 and 2019, HTN, DM, Carotid stenosis, Hypothyroidism, and ESRD on HD (last received on Monday 12/30) who presented to outside hospital with altered mental status as he was unresponsive, aphasic, and not moving any extremities. He was treated with tPA and admitted for observation.       Was notified by RN pt was nauseous and zofran was given; tube feeds placed on hold. Pt's fever spiked to 100.7 and CBC/BMP/UA/CXray/Blood cultures x2/Tylenol was drawn/administered. Notifed for critical value of Blood Glucose of 36 and 25IV dextrose was given. Pt's blood pressure dropped to SBP of 81/46. Was noted Pt received standing Croeg along with one time bp tx with lopressor. Most likely due to the major decrease in pressure. 250cc bolus was administered with a slight increase to 91/44. Neurologically pt is stable, follows commands, LUE trace antigravity with drift < 3 sec down to bed, requires extensive prompting, RLE 2/5 /LLE 3/5. Pressure decreased back down to 85/45.MICU was consulted for an increase in WBC from 13.65 to 28.14, fever and hypotensive. Recommended: Zosyn stat 3.375 continued with q12, Vanco 1g one dose and 500cc bolus and Midorine 10 q8 via PEG. Procalcitonin in AM. Will continue to monitor closely.    Please hold anithypertenisve meds and reconsult ID in AM.

## 2020-01-01 NOTE — PROGRESS NOTE ADULT - ASSESSMENT
64 year old male that presented to outside hospital with altered mental status and treated with tPA, now transferred to [facility] with status epilepticus. 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     NEURO: Neurologically without acute change, repeat CTH without acute change, Continue close monitoring for neurologic deterioration, permissive HTN to gradual normotension, continue on high dose statin. Found to be in status epilepticus at OSH-  on vimpat, Keppra (redose post HD). Carotid dopplers showed: R ICA 70-99% stenosis; L ICA 50-69% stenosis. , nsx recommended outpatient follow up,  Continue medical management. MRI Brain w/o contrast noted above. OT for splints. Physical therapy recommended OK.     ANTITHROMBOTIC THERAPY:  Plavix in setting of history of CAD and in setting of carotid stenosis. AC in setting of afib, d/w cardiology and nephrology no objection to NOAC.     PULMONARY: CXR clear on 12/23, protecting airway, saturating well. Continue aggressive suctioning-  coughing improved even on at goal TF per RN. MAINTAIN ASPIRATION PRECAUTIONS Pulmonary consult appreciated.     CARDIOVASCULAR: TTE Left ventricular ejection fraction, by visual estimation, is 60 to 65%. Trace mitral valve regurgitation. Cardiac monitoring shows paroxysmal atrial fibrillation.  s/p CARLA in June 2019, cardiology consult appreciated     SBP goal: Normotension, avoid hypotension    GASTROINTESTINAL:  dysphagia screen failed. S&S evaluation deferred further testing. s/p EGD on 12/28 that showed non-bleeding gastric ulcers- on protonix 40 mg PO BID. PEG placed. TF as recommended- tolerating at goal.      Diet: NPO with TF via PEG    RENAL: CKD stage V awaiting transplant on HD M/W/F, s/p HD on 12/30. Keppra dosing HD days.  Off free water given hyponatremia which is improving.      Na Goal: Greater than 135     Miranda: No    HEMATOLOGY: H/H shows anemia now improved likely in setting of ESRD. FOBT negative, s/p 1 unit of PRBC on 12/26. s/p 1 unit on 12/27 prior to PEG.  LE doppler negative for DVT on 12/20.     DVT ppx: Heparin s.c    ID: afebrile,  leukocytosis Sputum cultures showed numerous Staph aureus, Moderate Enterobacter. Normal Respiratory giovanni. Monitor off abx for now. CT Chest/Abd/Pelvis on 12/24 negative for source of infection. ID recommends to monitor off antibiotics. ENcourage mobility and incentive spirometry d/w RN    OTHER: Plan endorsed to patient and son. All questions and concerns addressed.      DISPOSITION: [facility]     CORE MEASURES:        Admission NIHSS:      TPA: YES at OSH      LDL/HDL: 52/56     Depression Screen: unable to assess     Statin Therapy: YES     Dysphagia Screen: FAIL     Smoking  NO      Afib YES     Stroke Education YES    Obtain screening lower extremity venous ultrasound in patients who meet 1 or more of the following criteria as patient is high risk for DVT/PE on admission:   [] History of DVT/PE  []Hypercoagulable states (Factor V Leiden, Cancer, OCP, etc. )  []Prolonged immobility (hemiplegia/hemiparesis/post operative or any other extended immobilization)  [] Transferred from outside facility (Rehab or Long term care)  [] Age </= to 50

## 2020-01-01 NOTE — PROGRESS NOTE ADULT - ASSESSMENT
65 yo male with ESRD on HD (M-W-FRI without AVF as awaiting renal transplant expected within 3 months; Dr. Barrientos; [facility]), CAD s/p stent 2009 and CARLA in June 2019, HTN, DM type 2, hypothyroidism and left carotid stenosis admitted for altered mental status during dialysis, he became unresponsive and arm not moving therefore he was sent to hospital. At [facility], he received tPA on 12/13/19. Was noted to have SAH after tPA. Started on 3% saline. Had seizure, despite multiple AEDs remained in status epilepticus and was transferred to [facility] for continuous EEG monitoring. s/p extubation, PEG tube placement. Is awake and following commands. Nephrology is following for ESRD management.    ESRD on HD MWF as outpatient  - HD consent in the chart  - Last HD 12/30/19  - anticipate next HD Thursday 1/2/20   - Will keep now TTS schedule till rehab facility schedule is available  - please dose Keppra post HD on HD days      - not accepted to rehab yet; needs rehab w/ onsite HD    Hyponatremia  - from increased free water intake  - Use minimum possible free water flushes  - should improve w/ HD    Anemia  - Hgb at goal today; unable to use epogen due to CVA  - transfuse PRN Hgb <7 or hemodynamically significant bleeding  - no evidence of occult bleeding  - GI following     HTN  - well controlled  - continue coreg

## 2020-01-02 DIAGNOSIS — A41.9 SEPSIS, UNSPECIFIED ORGANISM: ICD-10-CM

## 2020-01-02 LAB
ANION GAP SERPL CALC-SCNC: 19 MMOL/L — HIGH (ref 5–17)
APPEARANCE UR: ABNORMAL
BACTERIA # UR AUTO: ABNORMAL
BASE EXCESS BLDV CALC-SCNC: -0.7 MMOL/L — SIGNIFICANT CHANGE UP (ref -2–2)
BILIRUB UR-MCNC: NEGATIVE — SIGNIFICANT CHANGE UP
BUN SERPL-MCNC: 68 MG/DL — HIGH (ref 7–23)
CA-I SERPL-SCNC: 1.13 MMOL/L — SIGNIFICANT CHANGE UP (ref 1.12–1.3)
CALCIUM SERPL-MCNC: 8.9 MG/DL — SIGNIFICANT CHANGE UP (ref 8.4–10.5)
CHLORIDE BLDV-SCNC: 94 MMOL/L — LOW (ref 96–108)
CHLORIDE SERPL-SCNC: 91 MMOL/L — LOW (ref 96–108)
CO2 BLDV-SCNC: 24 MMOL/L — SIGNIFICANT CHANGE UP (ref 22–30)
CO2 SERPL-SCNC: 21 MMOL/L — LOW (ref 22–31)
COLOR SPEC: SIGNIFICANT CHANGE UP
CREAT SERPL-MCNC: 7.84 MG/DL — HIGH (ref 0.5–1.3)
DIFF PNL FLD: ABNORMAL
EPI CELLS # UR: 0 /HPF — SIGNIFICANT CHANGE UP
GAS PNL BLDV: 124 MMOL/L — LOW (ref 135–145)
GAS PNL BLDV: SIGNIFICANT CHANGE UP
GAS PNL BLDV: SIGNIFICANT CHANGE UP
GLUCOSE BLDC GLUCOMTR-MCNC: 237 MG/DL — HIGH (ref 70–99)
GLUCOSE BLDV-MCNC: 123 MG/DL — HIGH (ref 70–99)
GLUCOSE SERPL-MCNC: 96 MG/DL — SIGNIFICANT CHANGE UP (ref 70–99)
GLUCOSE UR QL: ABNORMAL
HCO3 BLDV-SCNC: 23 MMOL/L — SIGNIFICANT CHANGE UP (ref 21–29)
HCT VFR BLD CALC: 30.6 % — LOW (ref 39–50)
HCT VFR BLDA CALC: 30 % — LOW (ref 39–50)
HGB BLD CALC-MCNC: 9.8 G/DL — LOW (ref 13–17)
HGB BLD-MCNC: 9.9 G/DL — LOW (ref 13–17)
HYALINE CASTS # UR AUTO: 1 /LPF — SIGNIFICANT CHANGE UP (ref 0–2)
KETONES UR-MCNC: NEGATIVE — SIGNIFICANT CHANGE UP
LACTATE BLDV-MCNC: 1.2 MMOL/L — SIGNIFICANT CHANGE UP (ref 0.7–2)
LEUKOCYTE ESTERASE UR-ACNC: ABNORMAL
MCHC RBC-ENTMCNC: 29.1 PG — SIGNIFICANT CHANGE UP (ref 27–34)
MCHC RBC-ENTMCNC: 32.4 GM/DL — SIGNIFICANT CHANGE UP (ref 32–36)
MCV RBC AUTO: 90 FL — SIGNIFICANT CHANGE UP (ref 80–100)
NITRITE UR-MCNC: NEGATIVE — SIGNIFICANT CHANGE UP
NRBC # BLD: 0 /100 WBCS — SIGNIFICANT CHANGE UP (ref 0–0)
OTHER CELLS CSF MANUAL: 12 ML/DL — LOW (ref 18–22)
PCO2 BLDV: 35 MMHG — SIGNIFICANT CHANGE UP (ref 35–50)
PH BLDV: 7.43 — SIGNIFICANT CHANGE UP (ref 7.35–7.45)
PH UR: 7 — SIGNIFICANT CHANGE UP (ref 5–8)
PLATELET # BLD AUTO: 495 K/UL — HIGH (ref 150–400)
PO2 BLDV: 54 MMHG — HIGH (ref 25–45)
POTASSIUM BLDV-SCNC: 3.8 MMOL/L — SIGNIFICANT CHANGE UP (ref 3.5–5.3)
POTASSIUM SERPL-MCNC: 4.4 MMOL/L — SIGNIFICANT CHANGE UP (ref 3.5–5.3)
POTASSIUM SERPL-SCNC: 4.4 MMOL/L — SIGNIFICANT CHANGE UP (ref 3.5–5.3)
PROCALCITONIN SERPL-MCNC: 0.64 NG/ML — HIGH (ref 0.02–0.1)
PROT UR-MCNC: ABNORMAL
RAPID RVP RESULT: SIGNIFICANT CHANGE UP
RBC # BLD: 3.4 M/UL — LOW (ref 4.2–5.8)
RBC # FLD: 15.1 % — HIGH (ref 10.3–14.5)
RBC CASTS # UR COMP ASSIST: 1 /HPF — SIGNIFICANT CHANGE UP (ref 0–4)
SAO2 % BLDV: 88 % — SIGNIFICANT CHANGE UP (ref 67–88)
SODIUM SERPL-SCNC: 131 MMOL/L — LOW (ref 135–145)
SP GR SPEC: 1.01 — SIGNIFICANT CHANGE UP (ref 1.01–1.02)
UROBILINOGEN FLD QL: NEGATIVE — SIGNIFICANT CHANGE UP
WBC # BLD: 31.43 K/UL — HIGH (ref 3.8–10.5)
WBC # FLD AUTO: 31.43 K/UL — HIGH (ref 3.8–10.5)
WBC UR QL: 46 /HPF — HIGH (ref 0–5)

## 2020-01-02 PROCEDURE — 99233 SBSQ HOSP IP/OBS HIGH 50: CPT

## 2020-01-02 PROCEDURE — 99233 SBSQ HOSP IP/OBS HIGH 50: CPT | Mod: GC

## 2020-01-02 PROCEDURE — 71045 X-RAY EXAM CHEST 1 VIEW: CPT | Mod: 26

## 2020-01-02 PROCEDURE — 93010 ELECTROCARDIOGRAM REPORT: CPT

## 2020-01-02 PROCEDURE — 12345: CPT | Mod: NC,GC

## 2020-01-02 PROCEDURE — 74177 CT ABD & PELVIS W/CONTRAST: CPT | Mod: 26

## 2020-01-02 PROCEDURE — 74018 RADEX ABDOMEN 1 VIEW: CPT | Mod: 26

## 2020-01-02 PROCEDURE — 71260 CT THORAX DX C+: CPT | Mod: 26

## 2020-01-02 RX ORDER — ACETAMINOPHEN 500 MG
1000 TABLET ORAL ONCE
Refills: 0 | Status: COMPLETED | OUTPATIENT
Start: 2020-01-02 | End: 2020-01-02

## 2020-01-02 RX ORDER — INSULIN LISPRO 100/ML
VIAL (ML) SUBCUTANEOUS EVERY 6 HOURS
Refills: 0 | Status: DISCONTINUED | OUTPATIENT
Start: 2020-01-02 | End: 2020-01-03

## 2020-01-02 RX ORDER — INSULIN LISPRO 100/ML
VIAL (ML) SUBCUTANEOUS
Refills: 0 | Status: DISCONTINUED | OUTPATIENT
Start: 2020-01-02 | End: 2020-01-02

## 2020-01-02 RX ORDER — METOCLOPRAMIDE HCL 10 MG
10 TABLET ORAL EVERY 8 HOURS
Refills: 0 | Status: DISCONTINUED | OUTPATIENT
Start: 2020-01-02 | End: 2020-01-03

## 2020-01-02 RX ORDER — VANCOMYCIN HCL 1 G
500 VIAL (EA) INTRAVENOUS ONCE
Refills: 0 | Status: COMPLETED | OUTPATIENT
Start: 2020-01-02 | End: 2020-01-02

## 2020-01-02 RX ORDER — MIDODRINE HYDROCHLORIDE 2.5 MG/1
10 TABLET ORAL EVERY 8 HOURS
Refills: 0 | Status: DISCONTINUED | OUTPATIENT
Start: 2020-01-02 | End: 2020-01-06

## 2020-01-02 RX ORDER — SODIUM CHLORIDE 9 MG/ML
500 INJECTION INTRAMUSCULAR; INTRAVENOUS; SUBCUTANEOUS ONCE
Refills: 0 | Status: COMPLETED | OUTPATIENT
Start: 2020-01-02 | End: 2020-01-02

## 2020-01-02 RX ADMIN — CLOPIDOGREL BISULFATE 75 MILLIGRAM(S): 75 TABLET, FILM COATED ORAL at 13:44

## 2020-01-02 RX ADMIN — Medication 3 MILLILITER(S): at 23:55

## 2020-01-02 RX ADMIN — PIPERACILLIN AND TAZOBACTAM 25 GRAM(S): 4; .5 INJECTION, POWDER, LYOPHILIZED, FOR SOLUTION INTRAVENOUS at 05:54

## 2020-01-02 RX ADMIN — Medication 3 MILLILITER(S): at 05:53

## 2020-01-02 RX ADMIN — Medication 100 MILLIGRAM(S): at 16:16

## 2020-01-02 RX ADMIN — Medication 1000 MILLIGRAM(S): at 00:40

## 2020-01-02 RX ADMIN — Medication 667 MILLIGRAM(S): at 13:43

## 2020-01-02 RX ADMIN — Medication 50 MICROGRAM(S): at 05:54

## 2020-01-02 RX ADMIN — Medication 10 MILLIGRAM(S): at 22:29

## 2020-01-02 RX ADMIN — Medication 667 MILLIGRAM(S): at 22:29

## 2020-01-02 RX ADMIN — LEVETIRACETAM 125 MILLIGRAM(S): 250 TABLET, FILM COATED ORAL at 13:46

## 2020-01-02 RX ADMIN — Medication 2: at 18:21

## 2020-01-02 RX ADMIN — LACOSAMIDE 50 MILLIGRAM(S): 50 TABLET ORAL at 18:21

## 2020-01-02 RX ADMIN — Medication 650 MILLIGRAM(S): at 05:55

## 2020-01-02 RX ADMIN — POLYETHYLENE GLYCOL 3350 17 GRAM(S): 17 POWDER, FOR SOLUTION ORAL at 13:45

## 2020-01-02 RX ADMIN — ATORVASTATIN CALCIUM 80 MILLIGRAM(S): 80 TABLET, FILM COATED ORAL at 22:29

## 2020-01-02 RX ADMIN — Medication 667 MILLIGRAM(S): at 05:54

## 2020-01-02 RX ADMIN — Medication 2: at 23:55

## 2020-01-02 RX ADMIN — PANTOPRAZOLE SODIUM 40 MILLIGRAM(S): 20 TABLET, DELAYED RELEASE ORAL at 05:54

## 2020-01-02 RX ADMIN — Medication 650 MILLIGRAM(S): at 13:47

## 2020-01-02 RX ADMIN — MIDODRINE HYDROCHLORIDE 10 MILLIGRAM(S): 2.5 TABLET ORAL at 05:54

## 2020-01-02 RX ADMIN — Medication 3 MILLIGRAM(S): at 22:29

## 2020-01-02 RX ADMIN — LACOSAMIDE 50 MILLIGRAM(S): 50 TABLET ORAL at 05:54

## 2020-01-02 RX ADMIN — Medication 650 MILLIGRAM(S): at 14:17

## 2020-01-02 RX ADMIN — Medication 3 MILLILITER(S): at 18:21

## 2020-01-02 RX ADMIN — Medication 1000 MILLIGRAM(S): at 15:02

## 2020-01-02 RX ADMIN — SODIUM CHLORIDE 2000 MILLILITER(S): 9 INJECTION INTRAMUSCULAR; INTRAVENOUS; SUBCUTANEOUS at 14:56

## 2020-01-02 RX ADMIN — Medication 650 MILLIGRAM(S): at 06:30

## 2020-01-02 RX ADMIN — MIDODRINE HYDROCHLORIDE 10 MILLIGRAM(S): 2.5 TABLET ORAL at 22:29

## 2020-01-02 RX ADMIN — Medication 3 MILLILITER(S): at 13:46

## 2020-01-02 RX ADMIN — PANTOPRAZOLE SODIUM 40 MILLIGRAM(S): 20 TABLET, DELAYED RELEASE ORAL at 18:22

## 2020-01-02 RX ADMIN — APIXABAN 5 MILLIGRAM(S): 2.5 TABLET, FILM COATED ORAL at 18:21

## 2020-01-02 RX ADMIN — PIPERACILLIN AND TAZOBACTAM 25 GRAM(S): 4; .5 INJECTION, POWDER, LYOPHILIZED, FOR SOLUTION INTRAVENOUS at 18:22

## 2020-01-02 RX ADMIN — LEVETIRACETAM 250 MILLIGRAM(S): 250 TABLET, FILM COATED ORAL at 05:54

## 2020-01-02 RX ADMIN — MIDODRINE HYDROCHLORIDE 10 MILLIGRAM(S): 2.5 TABLET ORAL at 13:43

## 2020-01-02 RX ADMIN — APIXABAN 5 MILLIGRAM(S): 2.5 TABLET, FILM COATED ORAL at 05:54

## 2020-01-02 RX ADMIN — LEVETIRACETAM 250 MILLIGRAM(S): 250 TABLET, FILM COATED ORAL at 18:22

## 2020-01-02 RX ADMIN — Medication 400 MILLIGRAM(S): at 14:32

## 2020-01-02 NOTE — PROGRESS NOTE ADULT - SUBJECTIVE AND OBJECTIVE BOX
[facility] Division of Hospital Medicine  Merlin Tijerina MD  Pager  [phone]    Patient is a 64y old  Male who presents with a chief complaint of Seizures (02 Jan 2020 12:11)      SUBJECTIVE / OVERNIGHT EVENTS:  patient spiked fever overnight, blood pressure decreased, bolus fluids and was hypoglycemic   ADDITIONAL REVIEW OF SYSTEMS:  no complaints      MEDICATIONS  (STANDING):  albuterol/ipratropium for Nebulization 3 milliLiter(s) Nebulizer every 6 hours  apixaban 5 milliGRAM(s) Oral every 12 hours  atorvastatin 80 milliGRAM(s) Oral at bedtime  calcium acetate 667 milliGRAM(s) Oral three times a day with meals  carvedilol 3.125 milliGRAM(s) Oral every 12 hours  clopidogrel Tablet 75 milliGRAM(s) Oral daily  dextrose 5%. 1000 milliLiter(s) (50 mL/Hr) IV Continuous <Continuous>  dextrose 50% Injectable 12.5 Gram(s) IV Push once  dextrose 50% Injectable 25 Gram(s) IV Push once  dextrose 50% Injectable 25 Gram(s) IV Push once  insulin lispro (HumaLOG) corrective regimen sliding scale   SubCutaneous every 6 hours  insulin NPH human recombinant 18 Unit(s) SubCutaneous every 6 hours  lacosamide Solution 50 milliGRAM(s) Oral two times a day  levETIRAcetam  Solution 250 milliGRAM(s) Oral two times a day  levETIRAcetam  Solution 125 milliGRAM(s) Oral daily  levothyroxine 50 MICROGram(s) Oral daily  melatonin 3 milliGRAM(s) Oral at bedtime  midodrine. 10 milliGRAM(s) Oral every 8 hours  pantoprazole   Suspension 40 milliGRAM(s) Oral two times a day  piperacillin/tazobactam IVPB.. 3.375 Gram(s) IV Intermittent every 12 hours  polyethylene glycol 3350 17 Gram(s) Oral daily  sodium chloride 0.9% Bolus 500 milliLiter(s) IV Bolus once    MEDICATIONS  (PRN):  acetaminophen    Suspension .. 650 milliGRAM(s) Oral every 4 hours PRN Temp greater or equal to 38C (100.4F), Mild Pain (1 - 3), Moderate Pain (4 - 6), Severe Pain (7 - 10)  dextrose 40% Gel 15 Gram(s) Oral once PRN Blood Glucose LESS THAN 70 milliGRAM(s)/deciLiter  glucagon  Injectable 1 milliGRAM(s) IntraMuscular once PRN Glucose <70 milliGRAM(s)/deciLiter  guaiFENesin   Syrup  (Sugar-Free) 200 milliGRAM(s) Oral every 6 hours PRN Cough  senna Syrup 10 milliLiter(s) Oral two times a day PRN Constipation      CAPILLARY BLOOD GLUCOSE      POCT Blood Glucose.: 93 mg/dL (02 Jan 2020 05:38)  POCT Blood Glucose.: 110 mg/dL (01 Jan 2020 23:44)  POCT Blood Glucose.: 102 mg/dL (01 Jan 2020 23:42)  POCT Blood Glucose.: 179 mg/dL (01 Jan 2020 22:00)  POCT Blood Glucose.: 39 mg/dL (01 Jan 2020 21:38)  POCT Blood Glucose.: 36 mg/dL (01 Jan 2020 21:36)  POCT Blood Glucose.: 184 mg/dL (01 Jan 2020 17:23)    I&O's Summary    01 Jan 2020 07:01  -  02 Jan 2020 07:00  --------------------------------------------------------  IN: 500 mL / OUT: 0 mL / NET: 500 mL    02 Jan 2020 07:01  -  02 Jan 2020 13:36  --------------------------------------------------------  IN: 0 mL / OUT: 1000 mL / NET: -1000 mL        PHYSICAL EXAM:  Vital Signs Last 24 Hrs  T(C): 36.7 (02 Jan 2020 12:35), Max: 38.2 (01 Jan 2020 20:00)  T(F): 98.1 (02 Jan 2020 12:35), Max: 100.7 (01 Jan 2020 20:00)  HR: 98 (02 Jan 2020 12:35) (69 - 105)  BP: 119/50 (02 Jan 2020 12:35) (77/42 - 193/95)  BP(mean): 67 (02 Jan 2020 08:17) (54 - 121)  RR: 18 (02 Jan 2020 12:35) (11 - 35)  SpO2: 96% (02 Jan 2020 12:35) (91% - 100%)      CONSTITUTIONAL: NAD, frail appearing  EYES: conjunctiva and sclera clear  ENMT: dry MMM no exudates visualized.  NECK: Supple  RESPIRATORY: Normal respiratory effort; lungs are clear  CARDIOVASCULAR: Regular rate and rhythm, normal S1 and S2, n No lower extremity edema;  permacath in place  ABDOMEN: + PEG soft nontender   MUSCLOSKELETAL:  Normal gait;   PSYCH: A+O to person, place, and time; affect appropriate  NEUROLOGY: right hemineglect, moderate dysarthria       LABS:                        9.9    31.43 )-----------( 495      ( 02 Jan 2020 05:56 )             30.6     01-02    131<L>  |  91<L>  |  68<H>  ----------------------------<  96  4.4   |  21<L>  |  7.84<H>    Ca    8.9      02 Jan 2020 05:56                  RADIOLOGY & ADDITIONAL TESTS:  Results Reviewed:   Imaging Personally Reviewed:  Electrocardiogram Personally Reviewed:    COORDINATION OF CARE:  Care Discussed with Consultants/Other Providers [Y/N]:  Prior or Outpatient Records Reviewed [Y/N]:

## 2020-01-02 NOTE — CHART NOTE - NSCHARTNOTEFT_GEN_A_CORE
Patient had episode of bilious vomiting today. Given the decrease in bowel movement in past week, there was a concern for bowel obstruction vs. ileus (although patient abdomen soft, non-tender, non-distended, w/ slightly decreased bowel sounds). CT Abdomen w/ contrast ordered STAT. Spoke to GI who are in agreement with scan. Contrast clearance given by Renal. Medicine re-consulted who are in agreement with the aforementioned plans. Decision for transfer pending CT scans.     In addition, patient noted to have drop in BP 80s/40s, MAP 65. MICU re-consulted who rec 250cc bolus. No need for ICU level of care and/or need for pressor support.     Plan:   [] CT abdomen w/ contrast  [] Continue to hold tube feeds untill tomorrow morning 1.3.20  [] Continue IV abx Vanc/Zosyn  [] Possible transfer to medicine

## 2020-01-02 NOTE — PROGRESS NOTE ADULT - PROBLEM SELECTOR PLAN 1
-Fever, tachycardia, and uptrending leukocytosis with unclear source, now on midodrine. Possibly related to aspiration given high aspiration risk vs bacteremia vs line infection.   -Will continue Midodrine 10 mg Q8 for now, wean as tolerated.   -RVP negative. No UA as pt ESRD.   -F/u blood culture.   -CT chest/abdomen pelvis w/ contrast w/o identifiable source of infection.   -Prior sputum cx positive for MSSA and Enterobacter possible colonizer  -If blood cultures + will need perma-cath removed.  -Get vanco trough now after dialysis redose, in addition continue zosyn  -F/u ID recs -Fever, tachycardia, and uptrending leukocytosis with unclear source, now on midodrine. Possibly related to aspiration given high aspiration risk vs bacteremia vs line infection.   -Will continue Midodrine 10 mg Q8 for now, wean as tolerated.   -RVP negative. No UA as pt ESRD.   -F/u blood cultures.   -CT chest/abdomen pelvis w/ contrast w/o identifiable source of infection.   -Prior sputum cx positive for MSSA and Enterobacter possible colonizer  -If blood cultures + will need perma-cath removed.  -Get vanco trough now after dialysis redose, in addition continue zosyn  -F/u ID recs -Fever, tachycardia, and uptrending leukocytosis with unclear source, now on midodrine. Possibly related to aspiration given high aspiration risk vs bacteremia vs line infection. Given patient's new urinary changes, would also be concerned for possible UTI despite history of ESRD.   -Will continue Midodrine 10 mg Q8 for now, wean as tolerated.   -RVP negative.  - bladder scan tonight; if retaining, will straight cath and get UA/Urine culture to test for or r/o UTI that could be causing patient's sepsis.  -F/u blood cultures.   -CT chest/abdomen pelvis w/ contrast w/o identifiable source of infection.   -Prior sputum cx positive for MSSA and Enterobacter possible colonizer  -If blood cultures + will need perma-cath removed.  -Get vanco trough in morning, redose as needed; continue zosyn  -F/u ID recs

## 2020-01-02 NOTE — CHART NOTE - NSCHARTNOTEFT_GEN_A_CORE
CT A/P w/o evidence of SBO or ileus. Per discussion with Dr. Tijerina, will transfer patient to Medicine service. Tube feeds have been restarted at the request of Dr. Tijerina.   Neurology will continue to follow, please call [phone] with any questions

## 2020-01-02 NOTE — PROGRESS NOTE ADULT - SUBJECTIVE AND OBJECTIVE BOX
Medicine Acceptance Note:    Chief Complaint: Patient is a 64y old  Male who presents with a chief complaint of Seizures (2020 16:15)        MEDICATIONS  (STANDING):  albuterol/ipratropium for Nebulization 3 milliLiter(s) Nebulizer every 6 hours  apixaban 5 milliGRAM(s) Oral every 12 hours  atorvastatin 80 milliGRAM(s) Oral at bedtime  calcium acetate 667 milliGRAM(s) Oral three times a day with meals  clopidogrel Tablet 75 milliGRAM(s) Oral daily  dextrose 5%. 1000 milliLiter(s) (50 mL/Hr) IV Continuous <Continuous>  dextrose 50% Injectable 12.5 Gram(s) IV Push once  dextrose 50% Injectable 25 Gram(s) IV Push once  dextrose 50% Injectable 25 Gram(s) IV Push once  insulin lispro (HumaLOG) corrective regimen sliding scale   SubCutaneous every 6 hours  lacosamide Solution 50 milliGRAM(s) Oral two times a day  levETIRAcetam  Solution 250 milliGRAM(s) Oral two times a day  levETIRAcetam  Solution 125 milliGRAM(s) Oral daily  levothyroxine 50 MICROGram(s) Oral daily  melatonin 3 milliGRAM(s) Oral at bedtime  midodrine. 10 milliGRAM(s) Oral every 8 hours  pantoprazole   Suspension 40 milliGRAM(s) Oral two times a day  piperacillin/tazobactam IVPB.. 3.375 Gram(s) IV Intermittent every 12 hours  polyethylene glycol 3350 17 Gram(s) Oral daily  sodium chloride 0.9% Bolus 500 milliLiter(s) IV Bolus once    MEDICATIONS  (PRN):  acetaminophen    Suspension .. 650 milliGRAM(s) Oral every 4 hours PRN Temp greater or equal to 38C (100.4F), Mild Pain (1 - 3), Moderate Pain (4 - 6), Severe Pain (7 - 10)  dextrose 40% Gel 15 Gram(s) Oral once PRN Blood Glucose LESS THAN 70 milliGRAM(s)/deciLiter  glucagon  Injectable 1 milliGRAM(s) IntraMuscular once PRN Glucose <70 milliGRAM(s)/deciLiter  guaiFENesin   Syrup  (Sugar-Free) 200 milliGRAM(s) Oral every 6 hours PRN Cough  senna Syrup 10 milliLiter(s) Oral two times a day PRN Constipation      Vital Signs Last 24 Hrs  T(C): 37.6 (2020 16:06), Max: 39 (2020 13:30)  T(F): 99.6 (2020 16:06), Max: 102.2 (2020 13:30)  HR: 90 (2020 18:00) (69 - 98)  BP: 127/61 (2020 18:00) (77/42 - 144/57)  BP(mean): 81 (2020 18:00) (54 - 91)  RR: 23 (2020 18:00) (11 - 35)  SpO2: 97% (2020 18:00) (91% - 100%)  Supplemental O2: [ ] No, on Room Air [ ] Yes,     I&O's Detail    2020 07:01  -  2020 07:00  --------------------------------------------------------  IN:    Sodium Chloride 0.9% IV Bolus: 500 mL  Total IN: 500 mL    OUT:  Total OUT: 0 mL    Total NET: 500 mL      2020 07:01  -  2020 18:35  --------------------------------------------------------  IN:    Enteral Tube Flush: 180 mL    IV PiggyBack: 200 mL    Sodium Chloride 0.9% IV Bolus: 500 mL  Total IN: 880 mL    OUT:    Emesis: 70 mL    Other: 1000 mL  Total OUT: 1070 mL    Total NET: -190 mL        CAPILLARY BLOOD GLUCOSE      POCT Blood Glucose.: 157 mg/dL (2020 18:15)  POCT Blood Glucose.: 91 mg/dL (2020 13:41)  POCT Blood Glucose.: 93 mg/dL (2020 05:38)  POCT Blood Glucose.: 110 mg/dL (2020 23:44)  POCT Blood Glucose.: 102 mg/dL (2020 23:42)  POCT Blood Glucose.: 179 mg/dL (2020 22:00)  POCT Blood Glucose.: 39 mg/dL (2020 21:38)  POCT Blood Glucose.: 36 mg/dL (2020 21:36)      PHYSICAL EXAM:  Daily     Daily Weight in k.4 (2020 12:35)   Appearance: NAD, well-developed	  HEENT:   Normal oral mucosa, PERRL, EOMI	  Neck: No JVD, no LAD, supple, trachea in midline  Cardiovascular: Normal S1 S2, No JVD, No murmurs  Respiratory: Lungs clear to auscultation, no wheezing, rhonchi  Gastrointestinal:  Soft, Non-tender, nondistended, + BS  Skin: No rashes, No ecchymoses, No cyanosis	  MSK/Extremities: Normal range of motion, 5/5 Muscle strength bilaterally. No clubbing, cyanosis or edema  Vascular: Peripheral pulses palpable 2+ bilaterally  Neurologic: Non-focal, CN II-XII intact  Psychiatry: A & O x 3, Mood & affect appropriate    LABS:                        9.9    31.43 )-----------( 495      ( 2020 05:56 )             30.6     01-02    131<L>  |  91<L>  |  68<H>  ----------------------------<  96  4.4   |  21<L>  |  7.84<H>    Ca    8.9      2020 05:56                    Microbiology:    RADIOLOGY & ADDITIONAL TESTS:    Xray -   CT -  MRI -     Imaging Personally Reviewed:  [ ] YES  [ ] NO  Consultant(s) Notes Reviewed:  [X] YES  [ ] NO  Care Discussed with Consultants/Other Providers [ ] YES  [ ] NO Medicine Acceptance Note:    Chief Complaint: Patient is a 64y old  Male who presents with a chief complaint of Seizures (02 Jan 2020 16:15)    HPI/Hospital course:  Patient is a 63 yo male with history of CAD s/p stents 2009 and 2019, HTN, DM, Carotid stenosis, Hypothyroidism, and ESRD (on HD) initially admitted to [facility] for AMS characterized by unresponsiveness, found to have new CVA in left basal ganglia; patient got tPA on 12/13/19 and developed hemorrhagic transformation with seizures, eventually transitioning to status epilepticus despite multiple anti-seizure medications. Patient was intubated for airway protection and transferred to [facility] for continued management, including 24-hour EEG. Patient extubated on 12/19. He failed a S&S exam, so was kept NPO with NG tube for tube feeds, and now has a PEG tube. Course has been complicated by hyponatremia that is currently resolved. Patient was being prepared for discharge to rehab when patient was noted to be febrile to 102.2 last night with general malaise, requiring workup for infection. Patient meeting sepsis criteria with fever and subsequent hypotension, for which he received midodrine    Patient alert and oriented on my exam, indicates that he feels a little nauseous. Did have brief vomiting this AM, when they temporarily stopped his tube feeds; subsequent CT A/P showed no evidence of obstruction or ileus and tube feeds were restarted. Patient denies any current pain. He states he feels okay. He has felt feverish, but does not feel so currently. He denies fever/chills, headache, chest pain, SOB, palpitations, abdominal pain, dysuria, changes in urinary or bowel habits, joint pain/swelling, weakness. Son present at bedside, states that he hasn't had a bowel movement since his stroke.     MEDICATIONS  (STANDING):  albuterol/ipratropium for Nebulization 3 milliLiter(s) Nebulizer every 6 hours  apixaban 5 milliGRAM(s) Oral every 12 hours  atorvastatin 80 milliGRAM(s) Oral at bedtime  calcium acetate 667 milliGRAM(s) Oral three times a day with meals  clopidogrel Tablet 75 milliGRAM(s) Oral daily  dextrose 5%. 1000 milliLiter(s) (50 mL/Hr) IV Continuous <Continuous>  dextrose 50% Injectable 12.5 Gram(s) IV Push once  dextrose 50% Injectable 25 Gram(s) IV Push once  dextrose 50% Injectable 25 Gram(s) IV Push once  insulin lispro (HumaLOG) corrective regimen sliding scale   SubCutaneous every 6 hours  lacosamide Solution 50 milliGRAM(s) Oral two times a day  levETIRAcetam  Solution 250 milliGRAM(s) Oral two times a day  levETIRAcetam  Solution 125 milliGRAM(s) Oral daily  levothyroxine 50 MICROGram(s) Oral daily  melatonin 3 milliGRAM(s) Oral at bedtime  midodrine. 10 milliGRAM(s) Oral every 8 hours  pantoprazole   Suspension 40 milliGRAM(s) Oral two times a day  piperacillin/tazobactam IVPB.. 3.375 Gram(s) IV Intermittent every 12 hours  polyethylene glycol 3350 17 Gram(s) Oral daily  sodium chloride 0.9% Bolus 500 milliLiter(s) IV Bolus once    MEDICATIONS  (PRN):  acetaminophen    Suspension .. 650 milliGRAM(s) Oral every 4 hours PRN Temp greater or equal to 38C (100.4F), Mild Pain (1 - 3), Moderate Pain (4 - 6), Severe Pain (7 - 10)  dextrose 40% Gel 15 Gram(s) Oral once PRN Blood Glucose LESS THAN 70 milliGRAM(s)/deciLiter  glucagon  Injectable 1 milliGRAM(s) IntraMuscular once PRN Glucose <70 milliGRAM(s)/deciLiter  guaiFENesin   Syrup  (Sugar-Free) 200 milliGRAM(s) Oral every 6 hours PRN Cough  senna Syrup 10 milliLiter(s) Oral two times a day PRN Constipation      Vital Signs Last 24 Hrs  T(C): 37.6 (02 Jan 2020 16:06), Max: 39 (02 Jan 2020 13:30)  T(F): 99.6 (02 Jan 2020 16:06), Max: 102.2 (02 Jan 2020 13:30)  HR: 90 (02 Jan 2020 18:00) (69 - 98)  BP: 127/61 (02 Jan 2020 18:00) (77/42 - 144/57)  BP(mean): 81 (02 Jan 2020 18:00) (54 - 91)  RR: 23 (02 Jan 2020 18:00) (11 - 35)  SpO2: 97% (02 Jan 2020 18:00) (91% - 100%)  Supplemental O2: [ ] No, on Room Air [ ] Yes,     I&O's Detail    01 Jan 2020 07:01  -  02 Jan 2020 07:00  --------------------------------------------------------  IN:    Sodium Chloride 0.9% IV Bolus: 500 mL  Total IN: 500 mL    OUT:  Total OUT: 0 mL    Total NET: 500 mL      02 Jan 2020 07:01  -  02 Jan 2020 18:35  --------------------------------------------------------  IN:    Enteral Tube Flush: 180 mL    IV PiggyBack: 200 mL    Sodium Chloride 0.9% IV Bolus: 500 mL  Total IN: 880 mL    OUT:    Emesis: 70 mL    Other: 1000 mL  Total OUT: 1070 mL    Total NET: -190 mL        CAPILLARY BLOOD GLUCOSE      POCT Blood Glucose.: 157 mg/dL (02 Jan 2020 18:15)  POCT Blood Glucose.: 91 mg/dL (02 Jan 2020 13:41)  POCT Blood Glucose.: 93 mg/dL (02 Jan 2020 05:38)  POCT Blood Glucose.: 110 mg/dL (01 Jan 2020 23:44)  POCT Blood Glucose.: 102 mg/dL (01 Jan 2020 23:42)  POCT Blood Glucose.: 179 mg/dL (01 Jan 2020 22:00)  POCT Blood Glucose.: 39 mg/dL (01 Jan 2020 21:38)  POCT Blood Glucose.: 36 mg/dL (01 Jan 2020 21:36)      PHYSICAL EXAM:    ICU Vital Signs Last 24 Hrs  T(C): 37.6 (02 Jan 2020 16:06), Max: 39 (02 Jan 2020 13:30)  T(F): 99.6 (02 Jan 2020 16:06), Max: 102.2 (02 Jan 2020 13:30)  HR: 90 (02 Jan 2020 18:00) (69 - 98)  BP: 127/61 (02 Jan 2020 18:00) (77/42 - 144/57)  BP(mean): 81 (02 Jan 2020 18:00) (54 - 91)  ABP: --  ABP(mean): --  RR: 23 (02 Jan 2020 18:00) (11 - 35)  SpO2: 97% (02 Jan 2020 18:00) (91% - 100%)      Appearance: NAD, frail appearing	  HEENT:   dry mucous membranes, PERRL, EOMI	  Neck: No JVD, no LAD, supple, trachea in midline  Cardiovascular: Normal S1 S2, No JVD, No murmurs; patient has permacath in place on chest, c/d/i  Respiratory: Lungs clear to auscultation, no wheezing, rhonchi  Gastrointestinal:  Soft, Non-tender, nondistended, + BS; PEG tube in place, appears c/d/i  Skin: No rashes, No ecchymoses, No cyanosis	  MSK/Extremities: Normal passive range of motion, Peripheral pulses palpable 2+ bilaterally, No clubbing, cyanosis or edema  Neurologic: right sided kris-neglect, 5/5 Muscle strength on left, 0/5 on right due to neglect.  Psychiatry: A & O x 3, Mood & affect appropriate, follows commands    LABS:                        9.9    31.43 )-----------( 495      ( 02 Jan 2020 05:56 )             30.6     01-02    131<L>  |  91<L>  |  68<H>  ----------------------------<  96  4.4   |  21<L>  |  7.84<H>    Ca    8.9      02 Jan 2020 05:56      Microbiology:  Culture - Sputum . (12.24.19 @ 06:07)    Gram Stain:   Numerous polymorphonuclear leukocytes per low power field  Few Squamous epithelial cells per low power field  Numerous Gram positive cocci in pairs, chains and clusters per oil power  field    Blood cultures pending      RADIOLOGY & ADDITIONAL TESTS:    CT Chest / CT Abdomen and Pelvis w/ Oral Cont and w/ IV Cont (01.02.20 @ 16:50)  IMPRESSION:     No bowel obstruction.    No CT evidence of acute intra-abdominal or intrathoracic pathology.    Imaging Personally Reviewed:  [x] YES  [ ] NO  Consultant(s) Notes Reviewed:  [X] YES  [ ] NO  Care Discussed with Consultants/Other Providers [x] YES  [ ] NO Medicine Acceptance Note:    Chief Complaint: Patient is a 64y old  Male who presents with a chief complaint of Seizures (02 Jan 2020 16:15)    HPI/Hospital course:  Patient is a 63 yo male with history of CAD s/p stents 2009 and 2019, HTN, DM, Carotid stenosis, Hypothyroidism, and ESRD (on HD) initially admitted to [facility] for AMS characterized by unresponsiveness, found to have new CVA in left basal ganglia; patient got tPA on 12/13/19 and developed hemorrhagic transformation with seizures, eventually transitioning to status epilepticus despite multiple anti-seizure medications. Patient was intubated for airway protection and transferred to [facility] for continued management, including 24-hour EEG. Patient extubated on 12/19. He failed a S&S exam, so was kept NPO with NG tube for tube feeds, and now has a PEG tube. Course has been complicated by hyponatremia that is currently resolved. Patient was being prepared for discharge to rehab when patient was noted to be febrile to 102.2 last night with general malaise, requiring workup for infection. Patient meeting sepsis criteria with fever and subsequent hypotension, for which he received midodrine    Patient alert and oriented on my exam, indicates that he feels a little nauseous. Did have brief vomiting this AM, when they temporarily stopped his tube feeds; subsequent CT A/P showed no evidence of obstruction or ileus and tube feeds were restarted. Patient denies any current pain. He states he feels okay. He has felt feverish, but does not feel so currently. He denies fever/chills, headache, chest pain, SOB, palpitations, abdominal pain, dysuria, changes bowel habits, joint pain/swelling, weakness. Son present at bedside, states that he hasn't had a bowel movement since around the time of his stroke. On detailed questioning, family indicates that he does urinate some on his own despite his ESRD; he does indicate difficulty with his urination and discomfort/pressure sensations.    MEDICATIONS  (STANDING):  albuterol/ipratropium for Nebulization 3 milliLiter(s) Nebulizer every 6 hours  apixaban 5 milliGRAM(s) Oral every 12 hours  atorvastatin 80 milliGRAM(s) Oral at bedtime  calcium acetate 667 milliGRAM(s) Oral three times a day with meals  clopidogrel Tablet 75 milliGRAM(s) Oral daily  dextrose 5%. 1000 milliLiter(s) (50 mL/Hr) IV Continuous <Continuous>  dextrose 50% Injectable 12.5 Gram(s) IV Push once  dextrose 50% Injectable 25 Gram(s) IV Push once  dextrose 50% Injectable 25 Gram(s) IV Push once  insulin lispro (HumaLOG) corrective regimen sliding scale   SubCutaneous every 6 hours  lacosamide Solution 50 milliGRAM(s) Oral two times a day  levETIRAcetam  Solution 250 milliGRAM(s) Oral two times a day  levETIRAcetam  Solution 125 milliGRAM(s) Oral daily  levothyroxine 50 MICROGram(s) Oral daily  melatonin 3 milliGRAM(s) Oral at bedtime  midodrine. 10 milliGRAM(s) Oral every 8 hours  pantoprazole   Suspension 40 milliGRAM(s) Oral two times a day  piperacillin/tazobactam IVPB.. 3.375 Gram(s) IV Intermittent every 12 hours  polyethylene glycol 3350 17 Gram(s) Oral daily  sodium chloride 0.9% Bolus 500 milliLiter(s) IV Bolus once    MEDICATIONS  (PRN):  acetaminophen    Suspension .. 650 milliGRAM(s) Oral every 4 hours PRN Temp greater or equal to 38C (100.4F), Mild Pain (1 - 3), Moderate Pain (4 - 6), Severe Pain (7 - 10)  dextrose 40% Gel 15 Gram(s) Oral once PRN Blood Glucose LESS THAN 70 milliGRAM(s)/deciLiter  glucagon  Injectable 1 milliGRAM(s) IntraMuscular once PRN Glucose <70 milliGRAM(s)/deciLiter  guaiFENesin   Syrup  (Sugar-Free) 200 milliGRAM(s) Oral every 6 hours PRN Cough  senna Syrup 10 milliLiter(s) Oral two times a day PRN Constipation      Vital Signs Last 24 Hrs  T(C): 37.6 (02 Jan 2020 16:06), Max: 39 (02 Jan 2020 13:30)  T(F): 99.6 (02 Jan 2020 16:06), Max: 102.2 (02 Jan 2020 13:30)  HR: 90 (02 Jan 2020 18:00) (69 - 98)  BP: 127/61 (02 Jan 2020 18:00) (77/42 - 144/57)  BP(mean): 81 (02 Jan 2020 18:00) (54 - 91)  RR: 23 (02 Jan 2020 18:00) (11 - 35)  SpO2: 97% (02 Jan 2020 18:00) (91% - 100%)  Supplemental O2: [ ] No, on Room Air [ ] Yes,     I&O's Detail    01 Jan 2020 07:01  -  02 Jan 2020 07:00  --------------------------------------------------------  IN:    Sodium Chloride 0.9% IV Bolus: 500 mL  Total IN: 500 mL    OUT:  Total OUT: 0 mL    Total NET: 500 mL      02 Jan 2020 07:01  -  02 Jan 2020 18:35  --------------------------------------------------------  IN:    Enteral Tube Flush: 180 mL    IV PiggyBack: 200 mL    Sodium Chloride 0.9% IV Bolus: 500 mL  Total IN: 880 mL    OUT:    Emesis: 70 mL    Other: 1000 mL  Total OUT: 1070 mL    Total NET: -190 mL        CAPILLARY BLOOD GLUCOSE      POCT Blood Glucose.: 157 mg/dL (02 Jan 2020 18:15)  POCT Blood Glucose.: 91 mg/dL (02 Jan 2020 13:41)  POCT Blood Glucose.: 93 mg/dL (02 Jan 2020 05:38)  POCT Blood Glucose.: 110 mg/dL (01 Jan 2020 23:44)  POCT Blood Glucose.: 102 mg/dL (01 Jan 2020 23:42)  POCT Blood Glucose.: 179 mg/dL (01 Jan 2020 22:00)  POCT Blood Glucose.: 39 mg/dL (01 Jan 2020 21:38)  POCT Blood Glucose.: 36 mg/dL (01 Jan 2020 21:36)      PHYSICAL EXAM:    ICU Vital Signs Last 24 Hrs  T(C): 37.6 (02 Jan 2020 16:06), Max: 39 (02 Jan 2020 13:30)  T(F): 99.6 (02 Jan 2020 16:06), Max: 102.2 (02 Jan 2020 13:30)  HR: 90 (02 Jan 2020 18:00) (69 - 98)  BP: 127/61 (02 Jan 2020 18:00) (77/42 - 144/57)  BP(mean): 81 (02 Jan 2020 18:00) (54 - 91)  ABP: --  ABP(mean): --  RR: 23 (02 Jan 2020 18:00) (11 - 35)  SpO2: 97% (02 Jan 2020 18:00) (91% - 100%)      Appearance: NAD, frail appearing	  HEENT:   dry mucous membranes, PERRL, EOMI	  Neck: No JVD, no LAD, supple, trachea in midline  Cardiovascular: Normal S1 S2, No JVD, No murmurs; patient has permacath in place on chest, c/d/i  Respiratory: Lungs clear to auscultation, no wheezing, rhonchi  Gastrointestinal:  Soft, nontender, nondistended, + BS; PEG tube in place, appears c/d/i;  : suprapubic tenderness to palpation, pt indicates sensation of fullness   Skin: No rashes, No ecchymoses, No cyanosis	  MSK/Extremities: Normal passive range of motion, Peripheral pulses palpable 2+ bilaterally, No clubbing, cyanosis or edema  Neurologic: right sided kris-neglect, 5/5 Muscle strength on left, 0/5 on right due to neglect.  Psychiatry: A & O x 3, Mood & affect appropriate, follows commands    LABS:                        9.9    31.43 )-----------( 495      ( 02 Jan 2020 05:56 )             30.6     01-02    131<L>  |  91<L>  |  68<H>  ----------------------------<  96  4.4   |  21<L>  |  7.84<H>    Ca    8.9      02 Jan 2020 05:56      Microbiology:  Culture - Sputum . (12.24.19 @ 06:07)    Gram Stain:   Numerous polymorphonuclear leukocytes per low power field  Few Squamous epithelial cells per low power field  Numerous Gram positive cocci in pairs, chains and clusters per oil power  field      Blood cultures pending      RADIOLOGY & ADDITIONAL TESTS:    CT Chest / CT Abdomen and Pelvis w/ Oral Cont and w/ IV Cont (01.02.20 @ 16:50)  IMPRESSION:     No bowel obstruction.    No CT evidence of acute intra-abdominal or intrathoracic pathology.    Imaging Personally Reviewed:  [x] YES  [ ] NO  Consultant(s) Notes Reviewed:  [X] YES  [ ] NO  Care Discussed with Consultants/Other Providers [x] YES  [ ] NO

## 2020-01-02 NOTE — PROGRESS NOTE ADULT - ASSESSMENT
64 year old male that presented to outside hospital with altered mental status and treated with tPA, now transferred to [facility] with status epilepticus. 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     NEURO: Neurologically without acute change, Continue close monitoring for neurologic deterioration, permissive HTN to gradual normotension, continue on high dose statin. Found to be in status epilepticus at OSH-  on vimpat, Keppra (redose post HD). Carotid dopplers showed: R ICA 70-99% stenosis; L ICA 50-69% stenosis- Continue medical management. MRI Brain w/o contrast noted above. OT for splints. Physical therapy recommended OK.     ANTITHROMBOTIC THERAPY:  Plavix in setting of history of CAD and in setting of carotid stenosis. AC in setting of afib, d/w cardiology and nephrology no objection to NOAC.     PULMONARY: CXR clear on 1/1/20, protecting airway, saturating well. Continue aggressive suctioning-  coughing improved even on at goal TF per RN. MAINTAIN ASPIRATION PRECAUTIONS Pulmonary consult appreciated.     CARDIOVASCULAR: TTE Left ventricular ejection fraction, by visual estimation, is 60 to 65%. Trace mitral valve regurgitation. Cardiac monitoring shows paroxysmal atrial fibrillation.  s/p CARLA in June 2019, cardiology consult appreciated. Midodrine for hypotension.     SBP goal: Normotension, avoid hypotension    GASTROINTESTINAL:  dysphagia screen failed. S&S evaluation deferred further testing. s/p EGD on 12/28 that showed non-bleeding gastric ulcers- on protonix 40 mg PO BID. PEG placed. TF as recommended- tolerating at goal.      Diet: NPO with TF via PEG    RENAL: ESRD awaiting transplant on HD M/W/F as outpatient, TTS as inpatient s/p HD on 12/30. Keppra dosing HD days.  Hyponatremia- plan for HD on 1/2/20     Na Goal: Greater than 135     Miranda: No    HEMATOLOGY: H/H shows anemia now improved likely in setting of ESRD. FOBT negative, s/p 1 unit of PRBC on 12/26. s/p 1 unit on 12/27 prior to PEG.  LE doppler negative for DVT on 12/20.     DVT ppx: Heparin s.c    ID: febrile overnight, leukocytosis increased overnight. MICU consulted: recommended  Zosyn stat 3.375 continued with q12, Vanco 1g one dose and 500cc bolus and Midorine 10 q8 via PEG. Not a candidate for MICU at the time. CXR without PNA, UA pending, BCx pending. ,May consider culturing PermaCath if no source identified, Medicine consulted for transfer. Previous sputum cultures showed numerous Staph aureus, Moderate Enterobacter. Normal Respiratory giovanni. CT Chest/Abd/Pelvis on 12/24 negative for source of infection.    OTHER: Plan endorsed to patient and son. All questions and concerns addressed.      DISPOSITION: OK once medically stablizied    CORE MEASURES:        Admission NIHSS:      TPA: YES at OSH      LDL/HDL: 52/56     Depression Screen: unable to assess     Statin Therapy: YES     Dysphagia Screen: FAIL     Smoking  NO      Afib YES     Stroke Education YES    Obtain screening lower extremity venous ultrasound in patients who meet 1 or more of the following criteria as patient is high risk for DVT/PE on admission:   [] History of DVT/PE  []Hypercoagulable states (Factor V Leiden, Cancer, OCP, etc. )  []Prolonged immobility (hemiplegia/hemiparesis/post operative or any other extended immobilization)  [] Transferred from outside facility (Rehab or Long term care)  [] Age </= to 50

## 2020-01-02 NOTE — PROGRESS NOTE ADULT - ASSESSMENT
64 M hx of CAD s/p 2009, 2019, HTN, DM2, carotid stenosis, ESRD on HD admitted as transfer on mechanical ventilation for status epilepticus in setting of new embolic CVA At [facility], he received tPA on 12/13/19. Was noted to have SAH after tPA. Started on 3% saline. Had seizure, despite multiple AEDs remained in status epilepticus and was transferred to [facility] for continuous EEG monitoring. s/p extubation, PEG tube placement.

## 2020-01-02 NOTE — PROGRESS NOTE ADULT - SUBJECTIVE AND OBJECTIVE BOX
INTERVAL HPI/OVERNIGHT EVENTS:    events noted  febrile to 100.6 overnight with worsening leukocytosis   vomited x 1 overnight  feeds now resumed    MEDICATIONS  (STANDING):  albuterol/ipratropium for Nebulization 3 milliLiter(s) Nebulizer every 6 hours  apixaban 5 milliGRAM(s) Oral every 12 hours  atorvastatin 80 milliGRAM(s) Oral at bedtime  calcium acetate 667 milliGRAM(s) Oral three times a day with meals  carvedilol 3.125 milliGRAM(s) Oral every 12 hours  clopidogrel Tablet 75 milliGRAM(s) Oral daily  dextrose 5%. 1000 milliLiter(s) (50 mL/Hr) IV Continuous <Continuous>  dextrose 50% Injectable 12.5 Gram(s) IV Push once  dextrose 50% Injectable 25 Gram(s) IV Push once  dextrose 50% Injectable 25 Gram(s) IV Push once  insulin lispro (HumaLOG) corrective regimen sliding scale   SubCutaneous every 6 hours  insulin NPH human recombinant 18 Unit(s) SubCutaneous every 6 hours  lacosamide Solution 50 milliGRAM(s) Oral two times a day  levETIRAcetam  Solution 250 milliGRAM(s) Oral two times a day  levETIRAcetam  Solution 125 milliGRAM(s) Oral daily  levothyroxine 50 MICROGram(s) Oral daily  melatonin 3 milliGRAM(s) Oral at bedtime  midodrine. 10 milliGRAM(s) Oral every 8 hours  pantoprazole   Suspension 40 milliGRAM(s) Oral two times a day  piperacillin/tazobactam IVPB.. 3.375 Gram(s) IV Intermittent every 12 hours  polyethylene glycol 3350 17 Gram(s) Oral daily  sodium chloride 0.9% Bolus 500 milliLiter(s) IV Bolus once    MEDICATIONS  (PRN):  acetaminophen    Suspension .. 650 milliGRAM(s) Oral every 4 hours PRN Temp greater or equal to 38C (100.4F), Mild Pain (1 - 3), Moderate Pain (4 - 6), Severe Pain (7 - 10)  dextrose 40% Gel 15 Gram(s) Oral once PRN Blood Glucose LESS THAN 70 milliGRAM(s)/deciLiter  glucagon  Injectable 1 milliGRAM(s) IntraMuscular once PRN Glucose <70 milliGRAM(s)/deciLiter  guaiFENesin   Syrup  (Sugar-Free) 200 milliGRAM(s) Oral every 6 hours PRN Cough  senna Syrup 10 milliLiter(s) Oral two times a day PRN Constipation      Allergies    No Known Allergies    Intolerances        Review of Systems:    General:  No wt loss, fevers, chills, night sweats,fatigue,   Eyes:  Good vision, no reported pain  ENT:  No sore throat, pain, runny nose, dysphagia  CV:  No pain, palpitations, hypo/hypertension  Resp:  No dyspnea, cough, tachypnea, wheezing  GI:  No pain, No nausea, No vomiting, No diarrhea, No constipation, No weight loss, No fever, No pruritis, No rectal bleeding, No melena, No dysphagia  :  No pain, bleeding, incontinence, nocturia  Muscle:  No pain, weakness  Neuro:  No weakness, tingling, memory problems  Psych:  No fatigue, insomnia, mood problems, depression  Endocrine:  No polyuria, polydypsia, cold/heat intolerance  Heme:  No petechiae, ecchymosis, easy bruisability  Skin:  No rash, tattoos, scars, edema      Vital Signs Last 24 Hrs  T(C): 36.7 (02 Jan 2020 12:35), Max: 38.2 (01 Jan 2020 20:00)  T(F): 98.1 (02 Jan 2020 12:35), Max: 100.7 (01 Jan 2020 20:00)  HR: 98 (02 Jan 2020 12:35) (69 - 105)  BP: 119/50 (02 Jan 2020 12:35) (77/42 - 193/95)  BP(mean): 67 (02 Jan 2020 08:17) (54 - 121)  RR: 18 (02 Jan 2020 12:35) (11 - 35)  SpO2: 96% (02 Jan 2020 12:35) (91% - 100%)    PHYSICAL EXAM:    ill appearing  frail  non toxic  soft, nt +peg   no edema    LABS:                        9.9    31.43 )-----------( 495      ( 02 Jan 2020 05:56 )             30.6     01-02    131<L>  |  91<L>  |  68<H>  ----------------------------<  96  4.4   |  21<L>  |  7.84<H>    Ca    8.9      02 Jan 2020 05:56            RADIOLOGY & ADDITIONAL TESTS:

## 2020-01-02 NOTE — PROGRESS NOTE ADULT - ATTENDING COMMENTS
-Patient seen/examined on 1/2/20. Case/plan discussed with the intern and resident as reviewed/edited by me above and in any comments below. -Patient seen/examined on 1/2/20. Case/plan discussed with the intern and resident as reviewed/edited by me above and in any comments below.  -Discussed with Dr. Tijerina. -Discussed with RN.   -Discussed with patient's family at bedside. Patient endorses suprapubic pain and retention and dysuria symptoms. Will check bladders scan and UA/UCx. If retaining may need to place Miranda. That may be source of fevers and leukocytosis. Patient also constipated for past several days, will give suppository.   -F/u cultures. Possibility of line infection. CT chest without PNA evident, however patient had reportedly been vomiting so aspiration possible. Monitor closely on feeds. Monitor for high residuals.   -Vitals closely. If necessary can try albumin for hypotension. -Check TSH and am cortisol.   -Consider LE duplex.

## 2020-01-02 NOTE — PROGRESS NOTE ADULT - SUBJECTIVE AND OBJECTIVE BOX
[facility]   MD NINA MASON MD RUORU WONG, PA    TEL:  OFFICE: [phone]  DR HSU CELL: [phone]  RAS MORELOS CELL: [phone]  DR. MARQUEZ CELL: [phone]  DR. ALEX CELL: [phone]    FROM 5 PM - 7 AM PLEASE CALL ANSWERING SERVICE: [phone]    RENAL FOLLOW UP NOTE  --------------------------------------------------------------------------------  HPI:      Pt seen and examined at bedside.   Denies SOB, chest pain     PAST HISTORY  --------------------------------------------------------------------------------  No significant changes to PMH, PSH, FHx, SHx, unless otherwise noted    ALLERGIES & MEDICATIONS  --------------------------------------------------------------------------------  Allergies    No Known Allergies    Intolerances      Standing Inpatient Medications  albuterol/ipratropium for Nebulization 3 milliLiter(s) Nebulizer every 6 hours  apixaban 5 milliGRAM(s) Oral every 12 hours  atorvastatin 80 milliGRAM(s) Oral at bedtime  calcium acetate 667 milliGRAM(s) Oral three times a day with meals  carvedilol 3.125 milliGRAM(s) Oral every 12 hours  clopidogrel Tablet 75 milliGRAM(s) Oral daily  dextrose 5%. 1000 milliLiter(s) IV Continuous <Continuous>  dextrose 50% Injectable 12.5 Gram(s) IV Push once  dextrose 50% Injectable 25 Gram(s) IV Push once  dextrose 50% Injectable 25 Gram(s) IV Push once  insulin lispro (HumaLOG) corrective regimen sliding scale   SubCutaneous every 6 hours  insulin NPH human recombinant 18 Unit(s) SubCutaneous every 6 hours  lacosamide Solution 50 milliGRAM(s) Oral two times a day  levETIRAcetam  Solution 250 milliGRAM(s) Oral two times a day  levETIRAcetam  Solution 125 milliGRAM(s) Oral daily  levothyroxine 50 MICROGram(s) Oral daily  melatonin 3 milliGRAM(s) Oral at bedtime  midodrine. 10 milliGRAM(s) Oral every 8 hours  pantoprazole   Suspension 40 milliGRAM(s) Oral two times a day  piperacillin/tazobactam IVPB.. 3.375 Gram(s) IV Intermittent every 12 hours  polyethylene glycol 3350 17 Gram(s) Oral daily  sodium chloride 0.9% Bolus 500 milliLiter(s) IV Bolus once  vancomycin  IVPB 500 milliGRAM(s) IV Intermittent once    PRN Inpatient Medications  acetaminophen    Suspension .. 650 milliGRAM(s) Oral every 4 hours PRN  dextrose 40% Gel 15 Gram(s) Oral once PRN  glucagon  Injectable 1 milliGRAM(s) IntraMuscular once PRN  guaiFENesin   Syrup  (Sugar-Free) 200 milliGRAM(s) Oral every 6 hours PRN  senna Syrup 10 milliLiter(s) Oral two times a day PRN      REVIEW OF SYSTEMS  --------------------------------------------------------------------------------  General: no fever  CVS: no chest pain  RESP: no sob, no cough  MSK: no edema     VITALS/PHYSICAL EXAM  --------------------------------------------------------------------------------  T(C): 39 (01-02-20 @ 13:30), Max: 39 (01-02-20 @ 13:30)  HR: 90 (01-02-20 @ 14:59) (69 - 105)  BP: 86/44 (01-02-20 @ 14:59) (77/42 - 193/95)  RR: 26 (01-02-20 @ 14:59) (11 - 35)  SpO2: 96% (01-02-20 @ 14:59) (91% - 100%)  Wt(kg): --        01-01-20 @ 07:01  -  01-02-20 @ 07:00  --------------------------------------------------------  IN: 500 mL / OUT: 0 mL / NET: 500 mL    01-02-20 @ 07:01  -  01-02-20 @ 15:23  --------------------------------------------------------  IN: 0 mL / OUT: 1000 mL / NET: -1000 mL      Physical Exam:  	Gen: NAD  	HEENT: MMM  	Pulm: CTA B/L  	CV: S1S2  	Abd: Soft, +BS  	Ext: No LE edema B/L                      Neuro: Awake   	Skin: Warm and Dry   	Vascular access: Right tunnelled HD catheter     LABS/STUDIES  --------------------------------------------------------------------------------              9.9    31.43 >-----------<  495      [01-02-20 @ 05:56]              30.6     131  |  91  |  68  ----------------------------<  96      [01-02-20 @ 05:56]  4.4   |  21  |  7.84        Ca     8.9     [01-02-20 @ 05:56]            Creatinine Trend:  SCr 7.84 [01-02 @ 05:56]  SCr 7.05 [01-01 @ 20:58]  SCr 6.28 [01-01 @ 06:00]  SCr 4.29 [12-31 @ 04:58]  SCr 6.26 [12-30 @ 04:27]    Urinalysis - [12-24-19 @ 01:57]      Color Yellow / Appearance Clear / SG 1.017 / pH 6.5      Gluc 500 mg/dL / Ketone Negative  / Bili Negative / Urobili Negative       Blood Small / Protein 300 mg/dL / Leuk Est Negative / Nitrite Negative      RBC 3 / WBC 4 / Hyaline 1 / Gran  / Sq Epi  / Non Sq Epi 1 / Bacteria Negative      Iron 45, TIBC 145, %sat 31      [12-26-19 @ 23:24]  Ferritin 1369      [12-26-19 @ 23:24]  PTH -- (Ca 6.9)      [06-05-19 @ 08:14]   562  HbA1c 8.4      [12-15-19 @ 11:00]  TSH 0.88      [12-19-19 @ 10:33]  Lipid: chol 134, , HDL 56, LDL 52      [12-15-19 @ 10:56]    HBsAb >1000.0      [12-14-19 @ 23:48]  HBsAg Nonreact      [12-14-19 @ 23:48]  HCV 0.15, Nonreact      [12-14-19 @ 23:48]

## 2020-01-02 NOTE — PROGRESS NOTE ADULT - ASSESSMENT
dysphagia  cva  anemia  fever    s/p peg  feeds as tolerated  proton pump inhibitor daily  no overt gi bleeding  cbc daily  MICU eval appreciated  abx as per primary team  will follow     Advanced care planning was discussed with patient and family.  Advanced care planning forms were reviewed and discussed.  Risks, benefits and alternatives of gastroenterologic procedures were discussed in detail and all questions were answered.    30 minutes spent.

## 2020-01-02 NOTE — PROGRESS NOTE ADULT - PROBLEM SELECTOR PLAN 3
cw Keppra and Vimpat per neuro - appears resolved  - continue Keppra and Vimpat per neuro for seizure prophylaxis  - regular neuro checks

## 2020-01-02 NOTE — PROGRESS NOTE ADULT - ATTENDING COMMENTS
Agree with above. Patient seen and examined. Complex medical case where patient initially with stroke symptoms from basal ganglia infarct s/p tPA with course complicated by seizures and respiratory failure and intubation. He has now had a PEG placed and now is being monitored in the stroke unit.     MICU evaluation called for hypotension. He was noted to have SBP 80s this afternoon after HD. He was given 500 cc IVF with improvement in BP. At the time of my evaluation he had just returned from CT scan and his BP was 125/69. He was not tachycardic and he was not in any distress. He can get more IVF if needed with monitoring of his respiratory status and oxygenation.    His abdomen is distended and he appears to have dilated loops of bowel on his CT. Please followup the official read of CT abdomen/pelvis. Would make patient NPO for now and consider decompressing his abdomen.     Continue antibiotics and followup cultures. RVP is negative. Continue to monitor BP. Please check EKG to see if any changes given ST depressions seen on lead II on bedside monitor. Patient denies any chest pain. Continue telemetry monitoring. Patient does not have any evidence of respiratory distress and does not require mechanical ventilation at this time.    At this time patient does not require MICU level care. Please reconsult as needed.

## 2020-01-02 NOTE — PROGRESS NOTE ADULT - PROBLEM SELECTOR PLAN 1
patient with fever overnight, with drop in blood pressure which responded to fluid boluses   -Can continue Midodrine 10 mg Q8 for now   -F/u blood culture and RVP results.  Chest x-ray without clear source.  -No diarrhea as per nurse, would obtain CT chest/abdomen pelvis w/ contrast if okay with renal for further evaluate source of infection.  -If blood cultures + will need perma-cath removed. patient with fever overnight, with drop in blood pressure which responded to fluid boluses   -Can continue Midodrine 10 mg Q8 for now   -F/u blood culture and RVP results.  Chest x-ray without clear source.  -No diarrhea as per nurse, would obtain CT chest/abdomen pelvis w/ contrast if okay with renal for further evaluate source of infection.  -If blood cultures + will need perma-cath removed.  -Get vanco trough now after dialysis redose, in addition continue zosyn

## 2020-01-02 NOTE — PROGRESS NOTE ADULT - PROBLEM SELECTOR PLAN 5
- CHADVASCs  score 4  - Coreg 3.125 and Eliquis - CHADVASCs  score 4  - continue Coreg 3.125 for rate control  - Eliquis for AC

## 2020-01-02 NOTE — PROGRESS NOTE ADULT - PROBLEM SELECTOR PLAN 8
nph 18u q6  sliding scale.  PEG feeds. nph 18u q6  corrective scale insulin before meals  regular FSs  PEG feeds

## 2020-01-02 NOTE — PROGRESS NOTE ADULT - SUBJECTIVE AND OBJECTIVE BOX
THE PATIENT WAS SEEN AND EXAMINED BY ME WITH THE HOUSESTAFF AND STROKE TEAM DURING MORNING ROUNDS.   HPI:  64 year old male with a history of CAD s/p stents 2009 and 2019, HTN, DM, Carotid stenosis, Hypothyroidism, and ESRD on HD who presented to outside hospital with altered mental status as he was unresponsive, aphasic, and not moving any extremities. He was treated with tPA and admitted for observation.  Patient noted to have seizure during hospital course and was started on Keppra.  MRI brain was done which showed bilateral supratentorial and infratentorial infarcts.  Patient was undergoing dialysis when he developed A. fib and was intubated for airway protection.  Neurology following and multiple EEGs were done which showed patient in status epilepticus despite multiple antiepilectics.  Patient was transferred to [facility] for further management. Extubated on 12/21 and transferred to stroke unit.    SUBJECTIVE: Noted to be nauseous and zofran was given; tube feeds placed on hold. Found to be hypotensive with fever at 100.7.     acetaminophen    Suspension .. 650 milliGRAM(s) Oral every 4 hours PRN  albuterol/ipratropium for Nebulization 3 milliLiter(s) Nebulizer every 6 hours  apixaban 5 milliGRAM(s) Oral every 12 hours  atorvastatin 80 milliGRAM(s) Oral at bedtime  calcium acetate 667 milliGRAM(s) Oral three times a day with meals  carvedilol 3.125 milliGRAM(s) Oral every 12 hours  clopidogrel Tablet 75 milliGRAM(s) Oral daily  dextrose 40% Gel 15 Gram(s) Oral once PRN  dextrose 5%. 1000 milliLiter(s) IV Continuous <Continuous>  dextrose 50% Injectable 12.5 Gram(s) IV Push once  dextrose 50% Injectable 25 Gram(s) IV Push once  dextrose 50% Injectable 25 Gram(s) IV Push once  glucagon  Injectable 1 milliGRAM(s) IntraMuscular once PRN  guaiFENesin   Syrup  (Sugar-Free) 200 milliGRAM(s) Oral every 6 hours PRN  insulin lispro (HumaLOG) corrective regimen sliding scale   SubCutaneous every 6 hours  insulin NPH human recombinant 18 Unit(s) SubCutaneous every 6 hours  lacosamide Solution 50 milliGRAM(s) Oral two times a day  levETIRAcetam  Solution 250 milliGRAM(s) Oral two times a day  levETIRAcetam  Solution 125 milliGRAM(s) Oral daily  levothyroxine 50 MICROGram(s) Oral daily  melatonin 3 milliGRAM(s) Oral at bedtime  midodrine. 10 milliGRAM(s) Oral every 8 hours  pantoprazole   Suspension 40 milliGRAM(s) Oral two times a day  piperacillin/tazobactam IVPB.. 3.375 Gram(s) IV Intermittent every 12 hours  polyethylene glycol 3350 17 Gram(s) Oral daily  senna Syrup 10 milliLiter(s) Oral two times a day PRN  sodium chloride 0.9% Bolus 500 milliLiter(s) IV Bolus once    PHYSICAL EXAM:   Vital Signs Last 24 Hrs  T(C): 36.7 (02 Jan 2020 09:05), Max: 38.2 (01 Jan 2020 20:00)  T(F): 98 (02 Jan 2020 09:05), Max: 100.7 (01 Jan 2020 20:00)  HR: 80 (02 Jan 2020 09:05) (69 - 105)  BP: 110/50 (02 Jan 2020 09:05) (77/42 - 193/95)  BP(mean): 67 (02 Jan 2020 08:17) (54 - 121)  RR: 18 (02 Jan 2020 09:05) (11 - 35)  SpO2: 98% (02 Jan 2020 09:05) (91% - 100%)    General: No acute distress  HEENT: VF appear full , right visual neglect   Abdomen: Soft, nontender, nondistended   Extremities: No edema    NEUROLOGICAL EXAM:  Mental status: Awake, alert, eyes open, speaks 1-3 words, attentive following commands. bradykinetic , hypophonic , recognizes arm , right hemineglect   Cranial Nerves: trace left facial droop, RHHA, no nystagmus, moderate to severe dysarthria,  tongue midline  Motor exam: LUE trace antigravity with drift < 3 sec down to bed, right arm trace movement distally in wrist at times , requires extensive prompting, RLE 2/5 /LLE 3/5   Sensation: Intact to light touch   Coordination/ Gait: No dysmetria, gait not tested    LABS:                        9.9    31.43 )-----------( 495      ( 02 Jan 2020 05:56 )             30.6    01-02    131<L>  |  91<L>  |  68<H>  ----------------------------<  96  4.4   |  21<L>  |  7.84<H>    Ca    8.9      02 Jan 2020 05:56    Hemoglobin A1C, Whole Blood: 8.4 % (12-15 @ 11:00)  Hemoglobin A1C, Whole Blood: 8.2 % (12-14 @ 14:04)  Hemoglobin A1C, Whole Blood: 8.2 % (12-14 @ 12:05)    IMAGING: Reviewed by me.     CT Head No Cont (12.30.19)  Left basal ganglia lacunar infarct unchanged from 12/24/2019.    CT Head No Cont (12.24.19) Multifocal hypoattenuation involving the bilateral frontal and parietal and right occipital lobes is consistent with previously seen acute/subacute infarcts. No CT evidence for hemorrhagic transformation. No displaced calvarial fracture or appreciable scalp hematoma.    MRI Head No Cont (12.15.19) Numerous acute diffuse bilateral supratentorial and infratentorial infarcts.    CT Angio Head/Neck w/ IV Cont (12.14.19) Mild stenosis of the proximal left internal carotid artery of  up to 30% based on NASCET criteria. No stenosis of the cervical right   carotid artery based on NASCET criteria. Patent cervical vertebral arteries. No significant stenosis or occlusion of the major proximal arterial branches.

## 2020-01-02 NOTE — PROGRESS NOTE ADULT - PROBLEM SELECTOR PLAN 4
S/p CARLA in June 2019 c/b ST Depressions. Cards following. TTE in 12/2019 shows normal LVSF and trace MR.   Plavix in setting of history of CAD and in setting of carotid stenosis. AC in setting of afib-   - coreg 3.125 bid.

## 2020-01-02 NOTE — PROGRESS NOTE ADULT - ASSESSMENT
64 M hx of CAD s/p 2009, 2019, HTN, DM2, carotid stenosis, ESRD on HD admitted as transfer on mechanical ventilation for status epilepticus in setting of new embolic CVA At [facility], he received tPA on 12/13/19. Was noted to have SAH after tPA and hyponatremia, s/p hypertonic saline, course complicated by status epilepticus and intubated for airway protection, subsequently transferred to [facility] for further management, s/p extubation, course c/b dysphagia and failed S&S eval s/p PEG tube placement, now with septic shock of unclear etiology, possibly aspiration. 64 M hx of CAD s/p 2009, 2019, HTN, DM2, carotid stenosis, ESRD on HD admitted as transfer on mechanical ventilation for status epilepticus in setting of new embolic CVA At [facility], he received tPA on 12/13/19. Was noted to have SAH after tPA and hyponatremia, s/p hypertonic saline, course complicated by status epilepticus and intubated for airway protection, subsequently transferred to [facility] for further management, s/p extubation, course c/b dysphagia and failed S&S eval s/p PEG tube placement, now with septic shock of unclear etiology, possibly aspiration vs catheter infection.

## 2020-01-02 NOTE — PROGRESS NOTE ADULT - SUBJECTIVE AND OBJECTIVE BOX
CC: reassess for fever    Temp 100.7 last night, 102.2 this afternoon    Patient remains alert, generalized weakness; tolerating tube feeds, no complaints, "I'm good"    REVIEW OF SYSTEMS:  All other review of systems negative (Comprehensive ROS)    Antimicrobials Day # 1 Zosyn, Vanco x 1  Medications Reviewed:  albuterol/ipratropium for Nebulization 3 milliLiter(s) Nebulizer every 6 hours  apixaban 5 milliGRAM(s) Oral every 12 hours  atorvastatin 80 milliGRAM(s) Oral at bedtime  calcium acetate 667 milliGRAM(s) Oral three times a day with meals  carvedilol 3.125 milliGRAM(s) Oral every 12 hours  clopidogrel Tablet 75 milliGRAM(s) Oral daily  dextrose 5%. 1000 milliLiter(s) (50 mL/Hr) IV Continuous <Continuous>  insulin lispro (HumaLOG) corrective regimen sliding scale   SubCutaneous every 6 hours  lacosamide Solution 50 milliGRAM(s) Oral two times a day  levETIRAcetam  Solution 250 milliGRAM(s) Oral two times a day  levETIRAcetam  Solution 125 milliGRAM(s) Oral daily  levothyroxine 50 MICROGram(s) Oral daily  melatonin 3 milliGRAM(s) Oral at bedtime  midodrine. 10 milliGRAM(s) Oral every 8 hours  pantoprazole   Suspension 40 milliGRAM(s) Oral two times a day  piperacillin/tazobactam IVPB.. 3.375 Gram(s) IV Intermittent every 12 hours  polyethylene glycol 3350 17 Gram(s) Oral daily  sodium chloride 0.9% Bolus 500 milliLiter(s) IV Bolus once    Vital Signs Last 24 Hrs  T(F): 99.6 (02 Jan 2020 16:06), Max: 102.2 (02 Jan 2020 13:30)  HR: 87 (02 Jan 2020 15:24) (69 - 105)  BP: 104/46 (02 Jan 2020 15:24) (77/42 - 193/95)  BP(mean): 64 (02 Jan 2020 15:24) (54 - 121)  RR: 21 (02 Jan 2020 15:24) (11 - 35)  SpO2: 96% (02 Jan 2020 15:24) (91% - 100%)    PHYSICAL EXAM:  General: alert, no acute distress  Eyes:  anicteric, no conjunctival injection, no discharge  Oropharynx: no lesions or injection 	  Neck: supple, without adenopathy  R chest HD catheter site clean  Lungs: clear to auscultation  Heart: regular rate and rhythm; no murmur, rubs or gallops  Abdomen: soft, nondistended, nontender, G tube site clean  Skin: no lesions  Extremities: no edema  Neurologic: alert, generalized weakness    LAB RESULTS:                        9.9    31.43 )-----------( 495      ( 02 Jan 2020 05:56 )             30.6   01-02    131<L>  |  91<L>  |  68<H>  ----------------------------<  96  4.4   |  21<L>  |  7.84<H>    Ca    8.9      02 Jan 2020 05:56    MICROBIOLOGY:  RECENT CULTURES:  Culture - Sputum . (12.24.19 @ 06:07)  Numerous Staphylococcus aureus  Moderate Enterobacter aerogenes  Moderate Enterobacter cloacae complex  Normal Respiratory Rachel present      RADIOLOGY REVIEWED:  Xray Chest 1 View- PORTABLE-Urgent (01.01.20 @ 20:56) >  Clear lungs.    Xray Chest 1 View- PORTABLE-Routine (12.29.19 @ 15:18) >  Study is slightly limited due to poor inspiration. The lungs are grossly clear.

## 2020-01-02 NOTE — PROGRESS NOTE ADULT - ASSESSMENT
65 yo male with ESRD, HD, PAF, admitted with multiple CVA's.  Difficulty with pulmonary toilet and airway secretions, but clear CXR and CT scan.  S/P PEG  Respiratory status, remains alert  Sptm with Enterobacter and MSSA- viewed as colonizers, but now febrile, dramatic rise in WBC  Vanco x 1  Zosyn started  Even with repeat CXR clear, still most concerned abt aspiration   Line bacteremia possible as well    Suggest:  Continue Zosyn, should address latest sputum isolates  Await Bld Cxs results  Hold further Vanco pending above  No objection to repeat CT Chest  Follow temps and CBC/diff   D/w Neuro team

## 2020-01-02 NOTE — PROGRESS NOTE ADULT - ASSESSMENT
64M CAD s/p stents 2009 and 2019, HTN, DM, Carotid stenosis, Hypothyroidism, and ESRD on HD who presented to outside hospital  w concern of stroke s/p tPA, intubated for airway protection, hospital course c/.b by status epilepticus, transferred to [facility] for EEG monitoring and PEG placement. Pt w evidence of infection, infectious workup thus far sig for +sputum, on abx pending abd imaging. MICU consulted for hypotension 84/48 after receiving HD today during which 1L was removed.      #Hypotension  Hypotension 84/48 after receiving HD today during which 1L was removed. Pt also w evidence of sepsis.   BP improved to 104/46 w 500 cc IVF  No evidence of fluid overload on exam or recent imaging, would recc additional fluid up to 1 L LR that can be given in 500 cc Bolus w repeat BP readings  Would discontinue Coreg for now, rates appropriately controlled  Can continue Midodrine 10 mg q 8hrs via PEG after fluid resuscitation. Can be increased to 20 mg q 8 hrs      Cont Infectious workup, blood clx and RVP pending  Cont abx   Hold tube feeds for now   Agree w abd imaging     D/w attending, Dr. Soliman    Not a MICU candidate, please reconsult as necessary

## 2020-01-02 NOTE — PROGRESS NOTE ADULT - ASSESSMENT
63 yo male with ESRD on HD (M-W-FRI without AVF as awaiting renal transplant expected within 3 months; Dr. Barrientos; [facility]), CAD s/p stent 2009 and CARLA in June 2019, HTN, DM type 2, hypothyroidism and left carotid stenosis admitted for altered mental status during dialysis, he became unresponsive and arm not moving therefore he was sent to hospital. At [facility], he received tPA on 12/13/19. Was noted to have SAH after tPA. Started on 3% saline. Had seizure, despite multiple AEDs remained in status epilepticus and was transferred to [facility] for continuous EEG monitoring. s/p extubation, PEG tube placement. Is awake and following commands. Nephrology is following for ESRD management.    ESRD on HD MWF via tunnelled HD catheter   - HD consent in the chart  - Last HD 1/2/2020 with 1 L UF   - please dose Keppra post HD on HD days    -Discussed with team, pt planned for CT with contrast in view of fever  plan for HD tomorrow post contrast     - needs rehab w/ onsite HD    Hyponatremia  - from increased free water intake  - Use minimum possible free water flushes  - should improve w/ HD    Anemia  - unable to use epogen due to CVA  - transfuse PRN Hgb <7 or hemodynamically significant bleeding  - no evidence of occult bleeding  - GI following     HTN  -Hypotensive today --   - Monitor closely    Fever   -etiology?  -Planned for CT ABd   - PT also with permacath -- follow up ID

## 2020-01-02 NOTE — PROGRESS NOTE ADULT - ATTENDING COMMENTS
currently no urgent need for transfer to medicine service.  Continue excellent care and follow up with infectious disease closely.  We will follow with you Will transfer to medicine service for further workup of WBC count and fevers.

## 2020-01-02 NOTE — PROGRESS NOTE ADULT - SUBJECTIVE AND OBJECTIVE BOX
CHIEF COMPLAINT:     HPI:  Patient is a 64 year old male with a history of CAD s/p stents 2009 and 2019, HTN, DM, Carotid stenosis, Hypothyroidism, and ESRD on HD who presented to outside hospital with altered mental status as he was unresponsive, aphasic, and not moving any extremities.  Was treated with tPA and admitted for observation.  Patient noted to have seizure during hospital course and was started on Keppra.  MRI brain was done which showed bilateral supratentorial and infratentorial infarcts.  Patient was undergoing dialysis when he developed A. fib and was intubated for airway protection.  Neurology following and multiple EEGs were done which showed patient in status epilepticus despite multiple antiepilectics.  Patient transferred to [facility] for management of status and PEG tube placement.     Pt with suspected Aspiration events, MRSA in sputum, currently septic as evidence of elevated WBC >30, and fevers. MICU consulted overnight for BP 80/50 s/p 750 cc IVF w improved BP.    Pt received HD today with 1L removed, received Midodrine 10 mg 1:43PM, however currently w emesis, pending abd imaging.     Allergies: No Known Allergies        REVIEW OF SYSTEMS:  [x] Unable to assess ROS because pt nonverbal    OBJECTIVE:  ICU Vital Signs Last 24 Hrs  T(C): 39 (02 Jan 2020 13:30), Max: 39 (02 Jan 2020 13:30)  T(F): 102.2 (02 Jan 2020 13:30), Max: 102.2 (02 Jan 2020 13:30)  HR: 87 (02 Jan 2020 15:24) (69 - 105)  BP: 104/46 (02 Jan 2020 15:24) (77/42 - 193/95)  BP(mean): 64 (02 Jan 2020 15:24) (54 - 121)  RR: 21 (02 Jan 2020 15:24) (11 - 35)  SpO2: 96% (02 Jan 2020 15:24) (91% - 100%)        01-01 @ 07:01  -  01-02 @ 07:00  --------------------------------------------------------  IN: 500 mL / OUT: 0 mL / NET: 500 mL    01-02 @ 07:01  -  01-02 @ 15:51  --------------------------------------------------------  IN: 0 mL / OUT: 1000 mL / NET: -1000 mL      CAPILLARY BLOOD GLUCOSE: POCT Blood Glucose.: 91 mg/dL (02 Jan 2020 13:41)      PHYSICAL EXAM:  GENERAL: NAD, thin, ill appearing  HEAD:  Atraumatic, Normocephalic  EYES: EOMI, PERRLA, conjunctiva and sclera clear  NECK: Supple, No JVD  CHEST/LUNG: Clear to auscultation bilaterally; No wheeze  HEART: Regular rate and rhythm; No murmurs, rubs, or gallops  ABDOMEN: Soft, initial voluntary guarding in the RLQ, but resolved, ND. PEG in place mid epigastric, dressing c/d/i  EXTREMITIES:  2+ Peripheral Pulses, No clubbing, cyanosis, or edema  PSYCH: AAOx1  NEUROLOGY: non-focal  SKIN: No rashes or lesions  Access: Right tunnelled HD catheter       HOSPITAL MEDICATIONS:  MEDICATIONS  (STANDING):  albuterol/ipratropium for Nebulization 3 milliLiter(s) Nebulizer every 6 hours  apixaban 5 milliGRAM(s) Oral every 12 hours  atorvastatin 80 milliGRAM(s) Oral at bedtime  calcium acetate 667 milliGRAM(s) Oral three times a day with meals  carvedilol 3.125 milliGRAM(s) Oral every 12 hours  clopidogrel Tablet 75 milliGRAM(s) Oral daily  dextrose 5%. 1000 milliLiter(s) (50 mL/Hr) IV Continuous <Continuous>  dextrose 50% Injectable 12.5 Gram(s) IV Push once  dextrose 50% Injectable 25 Gram(s) IV Push once  dextrose 50% Injectable 25 Gram(s) IV Push once  insulin lispro (HumaLOG) corrective regimen sliding scale   SubCutaneous every 6 hours  lacosamide Solution 50 milliGRAM(s) Oral two times a day  levETIRAcetam  Solution 250 milliGRAM(s) Oral two times a day  levETIRAcetam  Solution 125 milliGRAM(s) Oral daily  levothyroxine 50 MICROGram(s) Oral daily  melatonin 3 milliGRAM(s) Oral at bedtime  midodrine. 10 milliGRAM(s) Oral every 8 hours  pantoprazole   Suspension 40 milliGRAM(s) Oral two times a day  piperacillin/tazobactam IVPB.. 3.375 Gram(s) IV Intermittent every 12 hours  polyethylene glycol 3350 17 Gram(s) Oral daily  sodium chloride 0.9% Bolus 500 milliLiter(s) IV Bolus once  vancomycin  IVPB 500 milliGRAM(s) IV Intermittent once    MEDICATIONS  (PRN):  acetaminophen    Suspension .. 650 milliGRAM(s) Oral every 4 hours PRN Temp greater or equal to 38C (100.4F), Mild Pain (1 - 3), Moderate Pain (4 - 6), Severe Pain (7 - 10)  dextrose 40% Gel 15 Gram(s) Oral once PRN Blood Glucose LESS THAN 70 milliGRAM(s)/deciLiter  glucagon  Injectable 1 milliGRAM(s) IntraMuscular once PRN Glucose <70 milliGRAM(s)/deciLiter  guaiFENesin   Syrup  (Sugar-Free) 200 milliGRAM(s) Oral every 6 hours PRN Cough  senna Syrup 10 milliLiter(s) Oral two times a day PRN Constipation      LABS:                        9.9    31.43 )-----------( 495      ( 02 Jan 2020 05:56 )             30.6     01-02    131<L>  |  91<L>  |  68<H>  ----------------------------<  96  4.4   |  21<L>  |  7.84<H>    Ca    8.9      02 Jan 2020 05:56            Venous Blood Gas:  01-02 @ 08:51  7.43/35/54/23/88  VBG Lactate: 1.2      MICROBIOLOGY: Staph &, Enterobacter sputum    RADIOLOGY:  [x ] Reviewed and interpreted by me

## 2020-01-03 DIAGNOSIS — Z29.9 ENCOUNTER FOR PROPHYLACTIC MEASURES, UNSPECIFIED: ICD-10-CM

## 2020-01-03 DIAGNOSIS — Z02.9 ENCOUNTER FOR ADMINISTRATIVE EXAMINATIONS, UNSPECIFIED: ICD-10-CM

## 2020-01-03 LAB
ALBUMIN SERPL ELPH-MCNC: 2.6 G/DL — LOW (ref 3.3–5)
ALBUMIN SERPL ELPH-MCNC: 2.8 G/DL — LOW (ref 3.3–5)
ALP SERPL-CCNC: 78 U/L — SIGNIFICANT CHANGE UP (ref 40–120)
ALP SERPL-CCNC: 93 U/L — SIGNIFICANT CHANGE UP (ref 40–120)
ALT FLD-CCNC: 10 U/L — SIGNIFICANT CHANGE UP (ref 10–45)
ALT FLD-CCNC: 11 U/L — SIGNIFICANT CHANGE UP (ref 10–45)
ANION GAP SERPL CALC-SCNC: 16 MMOL/L — SIGNIFICANT CHANGE UP (ref 5–17)
ANION GAP SERPL CALC-SCNC: 17 MMOL/L — SIGNIFICANT CHANGE UP (ref 5–17)
ANISOCYTOSIS BLD QL: SLIGHT — SIGNIFICANT CHANGE UP
APTT BLD: 34 SEC — SIGNIFICANT CHANGE UP (ref 27.5–36.3)
APTT BLD: 34.3 SEC — SIGNIFICANT CHANGE UP (ref 27.5–36.3)
AST SERPL-CCNC: 18 U/L — SIGNIFICANT CHANGE UP (ref 10–40)
AST SERPL-CCNC: 24 U/L — SIGNIFICANT CHANGE UP (ref 10–40)
BASOPHILS # BLD AUTO: 0 K/UL — SIGNIFICANT CHANGE UP (ref 0–0.2)
BASOPHILS # BLD AUTO: 0.06 K/UL — SIGNIFICANT CHANGE UP (ref 0–0.2)
BASOPHILS NFR BLD AUTO: 0 % — SIGNIFICANT CHANGE UP (ref 0–2)
BASOPHILS NFR BLD AUTO: 0.2 % — SIGNIFICANT CHANGE UP (ref 0–2)
BILIRUB DIRECT SERPL-MCNC: 0.1 MG/DL — SIGNIFICANT CHANGE UP (ref 0–0.2)
BILIRUB SERPL-MCNC: 0.3 MG/DL — SIGNIFICANT CHANGE UP (ref 0.2–1.2)
BILIRUB SERPL-MCNC: 0.4 MG/DL — SIGNIFICANT CHANGE UP (ref 0.2–1.2)
BLD GP AB SCN SERPL QL: NEGATIVE — SIGNIFICANT CHANGE UP
BUN SERPL-MCNC: 39 MG/DL — HIGH (ref 7–23)
BUN SERPL-MCNC: 45 MG/DL — HIGH (ref 7–23)
CALCIUM SERPL-MCNC: 8.5 MG/DL — SIGNIFICANT CHANGE UP (ref 8.4–10.5)
CALCIUM SERPL-MCNC: 8.8 MG/DL — SIGNIFICANT CHANGE UP (ref 8.4–10.5)
CHLORIDE SERPL-SCNC: 86 MMOL/L — LOW (ref 96–108)
CHLORIDE SERPL-SCNC: 87 MMOL/L — LOW (ref 96–108)
CO2 SERPL-SCNC: 21 MMOL/L — LOW (ref 22–31)
CO2 SERPL-SCNC: 23 MMOL/L — SIGNIFICANT CHANGE UP (ref 22–31)
CORTIS AM PEAK SERPL-MCNC: 21.1 UG/DL — HIGH (ref 6–18.4)
CREAT SERPL-MCNC: 5.22 MG/DL — HIGH (ref 0.5–1.3)
CREAT SERPL-MCNC: 5.99 MG/DL — HIGH (ref 0.5–1.3)
EOSINOPHIL # BLD AUTO: 0 K/UL — SIGNIFICANT CHANGE UP (ref 0–0.5)
EOSINOPHIL # BLD AUTO: 0.11 K/UL — SIGNIFICANT CHANGE UP (ref 0–0.5)
EOSINOPHIL NFR BLD AUTO: 0 % — SIGNIFICANT CHANGE UP (ref 0–6)
EOSINOPHIL NFR BLD AUTO: 0.4 % — SIGNIFICANT CHANGE UP (ref 0–6)
GAS PNL BLDA: SIGNIFICANT CHANGE UP
GLUCOSE BLDC GLUCOMTR-MCNC: 274 MG/DL — HIGH (ref 70–99)
GLUCOSE BLDC GLUCOMTR-MCNC: 283 MG/DL — HIGH (ref 70–99)
GLUCOSE SERPL-MCNC: 256 MG/DL — HIGH (ref 70–99)
GLUCOSE SERPL-MCNC: 286 MG/DL — HIGH (ref 70–99)
HCT VFR BLD CALC: 22.7 % — LOW (ref 39–50)
HCT VFR BLD CALC: 26.9 % — LOW (ref 39–50)
HCT VFR BLD CALC: 27.2 % — LOW (ref 39–50)
HGB BLD-MCNC: 7.2 G/DL — LOW (ref 13–17)
HGB BLD-MCNC: 7.9 G/DL — LOW (ref 13–17)
HGB BLD-MCNC: 8.6 G/DL — LOW (ref 13–17)
HYPOCHROMIA BLD QL: SLIGHT — SIGNIFICANT CHANGE UP
IMM GRANULOCYTES NFR BLD AUTO: 1.6 % — HIGH (ref 0–1.5)
INR BLD: 1.64 RATIO — HIGH (ref 0.88–1.16)
INR BLD: 1.85 RATIO — HIGH (ref 0.88–1.16)
LACTATE SERPL-SCNC: 1.5 MMOL/L — SIGNIFICANT CHANGE UP (ref 0.7–2)
LYMPHOCYTES # BLD AUTO: 0.81 K/UL — LOW (ref 1–3.3)
LYMPHOCYTES # BLD AUTO: 1.57 K/UL — SIGNIFICANT CHANGE UP (ref 1–3.3)
LYMPHOCYTES # BLD AUTO: 2.6 % — LOW (ref 13–44)
LYMPHOCYTES # BLD AUTO: 5 % — LOW (ref 13–44)
MACROCYTES BLD QL: SLIGHT — SIGNIFICANT CHANGE UP
MAGNESIUM SERPL-MCNC: 2.1 MG/DL — SIGNIFICANT CHANGE UP (ref 1.6–2.6)
MAGNESIUM SERPL-MCNC: 2.2 MG/DL — SIGNIFICANT CHANGE UP (ref 1.6–2.6)
MANUAL SMEAR VERIFICATION: SIGNIFICANT CHANGE UP
MCHC RBC-ENTMCNC: 28.7 PG — SIGNIFICANT CHANGE UP (ref 27–34)
MCHC RBC-ENTMCNC: 29.3 PG — SIGNIFICANT CHANGE UP (ref 27–34)
MCHC RBC-ENTMCNC: 29.3 PG — SIGNIFICANT CHANGE UP (ref 27–34)
MCHC RBC-ENTMCNC: 29.4 GM/DL — LOW (ref 32–36)
MCHC RBC-ENTMCNC: 31.6 GM/DL — LOW (ref 32–36)
MCHC RBC-ENTMCNC: 31.7 GM/DL — LOW (ref 32–36)
MCV RBC AUTO: 92.3 FL — SIGNIFICANT CHANGE UP (ref 80–100)
MCV RBC AUTO: 92.5 FL — SIGNIFICANT CHANGE UP (ref 80–100)
MCV RBC AUTO: 97.8 FL — SIGNIFICANT CHANGE UP (ref 80–100)
MONOCYTES # BLD AUTO: 1.9 K/UL — HIGH (ref 0–0.9)
MONOCYTES # BLD AUTO: 1.91 K/UL — HIGH (ref 0–0.9)
MONOCYTES NFR BLD AUTO: 6.1 % — SIGNIFICANT CHANGE UP (ref 2–14)
MONOCYTES NFR BLD AUTO: 6.1 % — SIGNIFICANT CHANGE UP (ref 2–14)
NEUTROPHILS # BLD AUTO: 26.96 K/UL — HIGH (ref 1.8–7.4)
NEUTROPHILS # BLD AUTO: 28.39 K/UL — HIGH (ref 1.8–7.4)
NEUTROPHILS NFR BLD AUTO: 86.7 % — HIGH (ref 43–77)
NEUTROPHILS NFR BLD AUTO: 91.3 % — HIGH (ref 43–77)
NRBC # BLD: 0 /100 WBCS — SIGNIFICANT CHANGE UP (ref 0–0)
NRBC # BLD: 0 /100 WBCS — SIGNIFICANT CHANGE UP (ref 0–0)
PHOSPHATE SERPL-MCNC: 3.8 MG/DL — SIGNIFICANT CHANGE UP (ref 2.5–4.5)
PHOSPHATE SERPL-MCNC: 4.2 MG/DL — SIGNIFICANT CHANGE UP (ref 2.5–4.5)
PLAT MORPH BLD: NORMAL — SIGNIFICANT CHANGE UP
PLATELET # BLD AUTO: 324 K/UL — SIGNIFICANT CHANGE UP (ref 150–400)
PLATELET # BLD AUTO: 365 K/UL — SIGNIFICANT CHANGE UP (ref 150–400)
PLATELET # BLD AUTO: 423 K/UL — HIGH (ref 150–400)
POLYCHROMASIA BLD QL SMEAR: SLIGHT — SIGNIFICANT CHANGE UP
POTASSIUM SERPL-MCNC: 3.5 MMOL/L — SIGNIFICANT CHANGE UP (ref 3.5–5.3)
POTASSIUM SERPL-MCNC: 3.8 MMOL/L — SIGNIFICANT CHANGE UP (ref 3.5–5.3)
POTASSIUM SERPL-SCNC: 3.5 MMOL/L — SIGNIFICANT CHANGE UP (ref 3.5–5.3)
POTASSIUM SERPL-SCNC: 3.8 MMOL/L — SIGNIFICANT CHANGE UP (ref 3.5–5.3)
PROT SERPL-MCNC: 6.7 G/DL — SIGNIFICANT CHANGE UP (ref 6–8.3)
PROT SERPL-MCNC: 7.5 G/DL — SIGNIFICANT CHANGE UP (ref 6–8.3)
PROTHROM AB SERPL-ACNC: 19 SEC — HIGH (ref 10–12.9)
PROTHROM AB SERPL-ACNC: 21.7 SEC — HIGH (ref 10–13.1)
RBC # BLD: 2.46 M/UL — LOW (ref 4.2–5.8)
RBC # BLD: 2.75 M/UL — LOW (ref 4.2–5.8)
RBC # BLD: 2.94 M/UL — LOW (ref 4.2–5.8)
RBC # FLD: 15.4 % — HIGH (ref 10.3–14.5)
RBC # FLD: 15.6 % — HIGH (ref 10.3–14.5)
RBC # FLD: 15.7 % — HIGH (ref 10.3–14.5)
RBC BLD AUTO: ABNORMAL
RH IG SCN BLD-IMP: POSITIVE — SIGNIFICANT CHANGE UP
SODIUM SERPL-SCNC: 123 MMOL/L — LOW (ref 135–145)
SODIUM SERPL-SCNC: 127 MMOL/L — LOW (ref 135–145)
TSH SERPL-MCNC: 6.22 UIU/ML — HIGH (ref 0.27–4.2)
VANCOMYCIN TROUGH SERPL-MCNC: 16.6 UG/ML — SIGNIFICANT CHANGE UP (ref 10–20)
WBC # BLD: 26.85 K/UL — HIGH (ref 3.8–10.5)
WBC # BLD: 31.1 K/UL — HIGH (ref 3.8–10.5)
WBC # BLD: 31.1 K/UL — HIGH (ref 3.8–10.5)
WBC # FLD AUTO: 26.85 K/UL — HIGH (ref 3.8–10.5)
WBC # FLD AUTO: 31.1 K/UL — HIGH (ref 3.8–10.5)
WBC # FLD AUTO: 31.1 K/UL — HIGH (ref 3.8–10.5)

## 2020-01-03 PROCEDURE — 99233 SBSQ HOSP IP/OBS HIGH 50: CPT

## 2020-01-03 PROCEDURE — 70450 CT HEAD/BRAIN W/O DYE: CPT | Mod: 26

## 2020-01-03 PROCEDURE — 99233 SBSQ HOSP IP/OBS HIGH 50: CPT | Mod: GC

## 2020-01-03 PROCEDURE — 99291 CRITICAL CARE FIRST HOUR: CPT | Mod: 25

## 2020-01-03 PROCEDURE — 95816 EEG AWAKE AND DROWSY: CPT | Mod: 26

## 2020-01-03 PROCEDURE — 93010 ELECTROCARDIOGRAM REPORT: CPT | Mod: 77

## 2020-01-03 PROCEDURE — 93010 ELECTROCARDIOGRAM REPORT: CPT

## 2020-01-03 PROCEDURE — 93306 TTE W/DOPPLER COMPLETE: CPT | Mod: 26

## 2020-01-03 PROCEDURE — 99292 CRITICAL CARE ADDL 30 MIN: CPT

## 2020-01-03 PROCEDURE — 99291 CRITICAL CARE FIRST HOUR: CPT

## 2020-01-03 RX ORDER — PROTHROMBIN COMPLEX CONCENTRATE (HUMAN) 25.5; 16.5; 24; 22; 22; 26 [IU]/ML; [IU]/ML; [IU]/ML; [IU]/ML; [IU]/ML; [IU]/ML
2000 POWDER, FOR SOLUTION INTRAVENOUS ONCE
Refills: 0 | Status: COMPLETED | OUTPATIENT
Start: 2020-01-03 | End: 2020-01-03

## 2020-01-03 RX ORDER — PROTHROMBIN COMPLEX CONCENTRATE (HUMAN) 25.5; 16.5; 24; 22; 22; 26 [IU]/ML; [IU]/ML; [IU]/ML; [IU]/ML; [IU]/ML; [IU]/ML
500 POWDER, FOR SOLUTION INTRAVENOUS ONCE
Refills: 0 | Status: DISCONTINUED | OUTPATIENT
Start: 2020-01-03 | End: 2020-01-03

## 2020-01-03 RX ORDER — INSULIN HUMAN 100 [IU]/ML
1 INJECTION, SOLUTION SUBCUTANEOUS
Qty: 100 | Refills: 0 | Status: DISCONTINUED | OUTPATIENT
Start: 2020-01-03 | End: 2020-01-03

## 2020-01-03 RX ORDER — ALBUMIN HUMAN 25 %
50 VIAL (ML) INTRAVENOUS ONCE
Refills: 0 | Status: COMPLETED | OUTPATIENT
Start: 2020-01-03 | End: 2020-01-03

## 2020-01-03 RX ORDER — METOCLOPRAMIDE HCL 10 MG
10 TABLET ORAL EVERY 8 HOURS
Refills: 0 | Status: DISCONTINUED | OUTPATIENT
Start: 2020-01-03 | End: 2020-01-05

## 2020-01-03 RX ORDER — INSULIN LISPRO 100/ML
VIAL (ML) SUBCUTANEOUS EVERY 6 HOURS
Refills: 0 | Status: DISCONTINUED | OUTPATIENT
Start: 2020-01-03 | End: 2020-01-08

## 2020-01-03 RX ORDER — SODIUM CHLORIDE 9 MG/ML
500 INJECTION INTRAMUSCULAR; INTRAVENOUS; SUBCUTANEOUS ONCE
Refills: 0 | Status: COMPLETED | OUTPATIENT
Start: 2020-01-03 | End: 2020-01-03

## 2020-01-03 RX ORDER — MEROPENEM 1 G/30ML
1000 INJECTION INTRAVENOUS EVERY 24 HOURS
Refills: 0 | Status: DISCONTINUED | OUTPATIENT
Start: 2020-01-03 | End: 2020-01-04

## 2020-01-03 RX ORDER — DESMOPRESSIN ACETATE 0.1 MG/1
26 TABLET ORAL ONCE
Refills: 0 | Status: COMPLETED | OUTPATIENT
Start: 2020-01-03 | End: 2020-01-03

## 2020-01-03 RX ORDER — MEROPENEM 1 G/30ML
1000 INJECTION INTRAVENOUS ONCE
Refills: 0 | Status: COMPLETED | OUTPATIENT
Start: 2020-01-03 | End: 2020-01-03

## 2020-01-03 RX ORDER — CHLORHEXIDINE GLUCONATE 213 G/1000ML
1 SOLUTION TOPICAL
Refills: 0 | Status: DISCONTINUED | OUTPATIENT
Start: 2020-01-03 | End: 2020-01-14

## 2020-01-03 RX ORDER — SODIUM CHLORIDE 9 MG/ML
1000 INJECTION INTRAMUSCULAR; INTRAVENOUS; SUBCUTANEOUS
Refills: 0 | Status: DISCONTINUED | OUTPATIENT
Start: 2020-01-03 | End: 2020-01-04

## 2020-01-03 RX ADMIN — MIDODRINE HYDROCHLORIDE 10 MILLIGRAM(S): 2.5 TABLET ORAL at 06:28

## 2020-01-03 RX ADMIN — MIDODRINE HYDROCHLORIDE 10 MILLIGRAM(S): 2.5 TABLET ORAL at 13:02

## 2020-01-03 RX ADMIN — Medication 50 MICROGRAM(S): at 06:28

## 2020-01-03 RX ADMIN — Medication 650 MILLIGRAM(S): at 12:45

## 2020-01-03 RX ADMIN — Medication 3 MILLILITER(S): at 06:29

## 2020-01-03 RX ADMIN — SODIUM CHLORIDE 50 MILLILITER(S): 9 INJECTION INTRAMUSCULAR; INTRAVENOUS; SUBCUTANEOUS at 19:46

## 2020-01-03 RX ADMIN — Medication 667 MILLIGRAM(S): at 23:00

## 2020-01-03 RX ADMIN — SODIUM CHLORIDE 500 MILLILITER(S): 9 INJECTION INTRAMUSCULAR; INTRAVENOUS; SUBCUTANEOUS at 15:43

## 2020-01-03 RX ADMIN — LACOSAMIDE 50 MILLIGRAM(S): 50 TABLET ORAL at 17:50

## 2020-01-03 RX ADMIN — ATORVASTATIN CALCIUM 80 MILLIGRAM(S): 80 TABLET, FILM COATED ORAL at 23:00

## 2020-01-03 RX ADMIN — Medication 667 MILLIGRAM(S): at 13:02

## 2020-01-03 RX ADMIN — MEROPENEM 100 MILLIGRAM(S): 1 INJECTION INTRAVENOUS at 19:44

## 2020-01-03 RX ADMIN — DESMOPRESSIN ACETATE 226 MICROGRAM(S): 0.1 TABLET ORAL at 17:30

## 2020-01-03 RX ADMIN — INSULIN HUMAN 1 UNIT(S)/HR: 100 INJECTION, SOLUTION SUBCUTANEOUS at 19:46

## 2020-01-03 RX ADMIN — Medication 667 MILLIGRAM(S): at 06:29

## 2020-01-03 RX ADMIN — Medication 650 MILLIGRAM(S): at 07:05

## 2020-01-03 RX ADMIN — Medication 2: at 20:43

## 2020-01-03 RX ADMIN — Medication 3: at 18:00

## 2020-01-03 RX ADMIN — Medication 10 MILLIGRAM(S): at 14:40

## 2020-01-03 RX ADMIN — Medication 50 MILLILITER(S): at 13:57

## 2020-01-03 RX ADMIN — Medication 650 MILLIGRAM(S): at 12:13

## 2020-01-03 RX ADMIN — PROTHROMBIN COMPLEX CONCENTRATE (HUMAN) 400 INTERNATIONAL UNIT(S): 25.5; 16.5; 24; 22; 22; 26 POWDER, FOR SOLUTION INTRAVENOUS at 17:53

## 2020-01-03 RX ADMIN — MEROPENEM 100 MILLIGRAM(S): 1 INJECTION INTRAVENOUS at 15:50

## 2020-01-03 RX ADMIN — CLOPIDOGREL BISULFATE 75 MILLIGRAM(S): 75 TABLET, FILM COATED ORAL at 12:05

## 2020-01-03 RX ADMIN — APIXABAN 5 MILLIGRAM(S): 2.5 TABLET, FILM COATED ORAL at 06:29

## 2020-01-03 RX ADMIN — Medication 3 MILLILITER(S): at 23:52

## 2020-01-03 RX ADMIN — LEVETIRACETAM 250 MILLIGRAM(S): 250 TABLET, FILM COATED ORAL at 17:50

## 2020-01-03 RX ADMIN — PIPERACILLIN AND TAZOBACTAM 25 GRAM(S): 4; .5 INJECTION, POWDER, LYOPHILIZED, FOR SOLUTION INTRAVENOUS at 06:29

## 2020-01-03 RX ADMIN — LEVETIRACETAM 250 MILLIGRAM(S): 250 TABLET, FILM COATED ORAL at 06:28

## 2020-01-03 RX ADMIN — MIDODRINE HYDROCHLORIDE 10 MILLIGRAM(S): 2.5 TABLET ORAL at 23:01

## 2020-01-03 RX ADMIN — PANTOPRAZOLE SODIUM 40 MILLIGRAM(S): 20 TABLET, DELAYED RELEASE ORAL at 17:40

## 2020-01-03 RX ADMIN — Medication 650 MILLIGRAM(S): at 06:35

## 2020-01-03 RX ADMIN — CHLORHEXIDINE GLUCONATE 1 APPLICATION(S): 213 SOLUTION TOPICAL at 23:01

## 2020-01-03 RX ADMIN — SODIUM CHLORIDE 50 MILLILITER(S): 9 INJECTION INTRAMUSCULAR; INTRAVENOUS; SUBCUTANEOUS at 23:01

## 2020-01-03 RX ADMIN — PANTOPRAZOLE SODIUM 40 MILLIGRAM(S): 20 TABLET, DELAYED RELEASE ORAL at 06:29

## 2020-01-03 RX ADMIN — LACOSAMIDE 50 MILLIGRAM(S): 50 TABLET ORAL at 06:51

## 2020-01-03 RX ADMIN — Medication 3: at 06:29

## 2020-01-03 RX ADMIN — SODIUM CHLORIDE 500 MILLILITER(S): 9 INJECTION INTRAMUSCULAR; INTRAVENOUS; SUBCUTANEOUS at 23:02

## 2020-01-03 RX ADMIN — Medication 3 MILLILITER(S): at 18:01

## 2020-01-03 RX ADMIN — Medication 3: at 12:05

## 2020-01-03 RX ADMIN — Medication 3 MILLILITER(S): at 12:05

## 2020-01-03 NOTE — PROGRESS NOTE ADULT - SUBJECTIVE AND OBJECTIVE BOX
Follow-up Pulm Progress Note    Lethargic, minimally responsive  Opens eyes to repeated commands  98% on RA  Secretions much improved    Medications:  MEDICATIONS  (STANDING):  albuterol/ipratropium for Nebulization 3 milliLiter(s) Nebulizer every 6 hours  apixaban 5 milliGRAM(s) Oral every 12 hours  atorvastatin 80 milliGRAM(s) Oral at bedtime  calcium acetate 667 milliGRAM(s) Oral three times a day with meals  clopidogrel Tablet 75 milliGRAM(s) Oral daily  dextrose 5%. 1000 milliLiter(s) (50 mL/Hr) IV Continuous <Continuous>  dextrose 50% Injectable 12.5 Gram(s) IV Push once  dextrose 50% Injectable 25 Gram(s) IV Push once  dextrose 50% Injectable 25 Gram(s) IV Push once  insulin lispro (HumaLOG) corrective regimen sliding scale   SubCutaneous every 6 hours  lacosamide Solution 50 milliGRAM(s) Oral two times a day  levETIRAcetam  Solution 250 milliGRAM(s) Oral two times a day  levETIRAcetam  Solution 125 milliGRAM(s) Oral daily  levothyroxine 50 MICROGram(s) Oral daily  melatonin 3 milliGRAM(s) Oral at bedtime  midodrine. 10 milliGRAM(s) Oral every 8 hours  pantoprazole   Suspension 40 milliGRAM(s) Oral two times a day  piperacillin/tazobactam IVPB.. 3.375 Gram(s) IV Intermittent every 12 hours  polyethylene glycol 3350 17 Gram(s) Oral daily  sodium chloride 0.9% Bolus 500 milliLiter(s) IV Bolus once    MEDICATIONS  (PRN):  acetaminophen    Suspension .. 650 milliGRAM(s) Oral every 4 hours PRN Temp greater or equal to 38C (100.4F), Mild Pain (1 - 3), Moderate Pain (4 - 6), Severe Pain (7 - 10)  bisacodyl Suppository 10 milliGRAM(s) Rectal daily PRN Constipation  dextrose 40% Gel 15 Gram(s) Oral once PRN Blood Glucose LESS THAN 70 milliGRAM(s)/deciLiter  glucagon  Injectable 1 milliGRAM(s) IntraMuscular once PRN Glucose <70 milliGRAM(s)/deciLiter  guaiFENesin   Syrup  (Sugar-Free) 200 milliGRAM(s) Oral every 6 hours PRN Cough  metoclopramide Injectable 10 milliGRAM(s) IV Push every 8 hours PRN nausea/vomiting  senna Syrup 10 milliLiter(s) Oral two times a day PRN Constipation          Vital Signs Last 24 Hrs  T(C): 37.1 (2020 14:30), Max: 38.7 (2020 06:36)  T(F): 98.8 (2020 14:30), Max: 101.6 (2020 06:36)  HR: 80 (2020 14:30) (70 - 102)  BP: 101/54 (2020 14:30) (66/48 - 127/61)  BP(mean): 69 (2020 14:30) (55 - 95)  RR: 14 (2020 14:30) (14 - 30)  SpO2: 100% (2020 14:30) (96% - 100%) on RA       VBG pH 7.43  @ 08:51    VBG pCO2 35  @ 08:51    VBG O2 sat 88  @ 08:51    VBG lactate 1.2  @ 08:51       @ 07:01  -  03 @ 07:00  --------------------------------------------------------  IN: 1630 mL / OUT: 1570 mL / NET: 60 mL          LABS:                        8.6    31.10 )-----------( 423      ( 2020 08:33 )             27.2         127<L>  |  87<L>  |  39<H>  ----------------------------<  256<H>  3.8   |  23  |  5.22<H>    Ca    8.8      2020 06:57  Phos  3.8       Mg     2.1         TPro  7.5  /  Alb  2.8<L>  /  TBili  0.4  /  DBili  0.1  /  AST  24  /  ALT  11  /  AlkPhos  93  03          CAPILLARY BLOOD GLUCOSE      POCT Blood Glucose.: 283 mg/dL (2020 11:46)    PT/INR - ( 2020 08:30 )   PT: 21.7 sec;   INR: 1.85 ratio         PTT - ( 2020 08:30 )  PTT:34.0 sec  Urinalysis Basic - ( 2020 20:34 )    Color: Light Yellow / Appearance: Slightly Turbid / S.012 / pH: x  Gluc: x / Ketone: Negative  / Bili: Negative / Urobili: Negative   Blood: x / Protein: 300 mg/dL / Nitrite: Negative   Leuk Esterase: Moderate / RBC: 1 /hpf / WBC 46 /HPF   Sq Epi: x / Non Sq Epi: 0 /hpf / Bacteria: Many      Procalcitonin, Serum: 0.64 ng/mL (20 @ 05:56)                  CULTURES: (if applicable)  Culture Results:   No growth to date. ( @ 04:04)  Culture Results:   No growth to date. ( @ 00:54)    Most recent blood culture --  @ 04:04   -- -- .Blood Blood-Peripheral  @ 04:04  Most recent blood culture --  @ 00:54   -- -- .Blood Blood-Peripheral  @ 00:54        Physical Examination:  PULM: Clear to auscultation bilaterally, no significant sputum production, limited exam  CVS: S1, S2 heard    RADIOLOGY REVIEWED  CT chest: < from: CT Chest w/ IV Cont (20 @ 16:50) >  CHEST:     LUNGS AND LARGE AIRWAYS: Patent central airways. A 0.9 cm right lower lobe perifissural pulmonary nodule (series 3, image 67), unchanged from 2019. Additionally, a 0.4 cm rightupper lobe pulmonary nodule (series 3, image 48), unchanged. Mild bibasilar subsegmental atelectasis.  PLEURA: No pleural effusion. Biapical pleural scarring.  VESSELS: Thoracic aorta and main pulmonary artery are normal in caliber. Atherosclerotic change of the thoracic aorta. A right-sided central line terminates in the SVC/RA.  HEART: Heart size is normal. No pericardial effusion. Coronary artery atherosclerotic calcifications.  MEDIASTINUM AND [place]: No lymphadenopathy.  CHEST WALL AND LOWER NECK: Within normal limits.    < end of copied text >

## 2020-01-03 NOTE — PROGRESS NOTE ADULT - PROBLEM SELECTOR PLAN 3
- appears resolved  - continue Keppra and Vimpat per neuro for seizure treatment/prophylaxis  - regular neuro checks  - dose Keppra after HD on days pt receives HD per nephro

## 2020-01-03 NOTE — PROVIDER CONTACT NOTE (CRITICAL VALUE NOTIFICATION) - ASSESSMENT
pt remains neurologically stable.
denied chest pain , ALERT/LETHARGY AND AROUSABLE BY VERBAL, EKG was done during RRT
glucose as per lab is 40, pt diaphoretic, FS done 36, 39 consecutively

## 2020-01-03 NOTE — CONSULT NOTE ADULT - ASSESSMENT
Flip Mattson  64M  s/p hemorrhagic conversion of L BG stroke initially admitted to the NSCU and then stroke service transferred to medicine yesterday for sepsis workup. Patient had Rapid response called  this afternoon for AMS and hypotension. CTH showed diffuse SAH. patient was restarted on eliquis for A fib and Plavix for cardiac stent in June. Coags show INR of 1.9 and plts of 429. On exam:  patient more lethargic, AOx2, EO to voice, fc, no facial,  L HG 3/5 otherwise 0/5 on UE (baseline), atleast AG on b/l  LE.   - No acute intervention  - Repeat CTH in 4 hrs  - KCentra for INR reversal. Please recheck INR 30 minutes after administration and redose as per protocol  - Hold Eliquis and Plavix.  - Patient will be transferred to NSCU for q1 hr checks under neurointensivist.

## 2020-01-03 NOTE — PROGRESS NOTE ADULT - ASSESSMENT
64 M hx of CAD s/p 2009, 2019, HTN, DM2, carotid stenosis, ESRD on HD admitted as transfer on mechanical ventilation for status epilepticus in setting of new embolic CVA At [facility], he received tPA on 12/13/19. Was noted to have SAH after tPA and hyponatremia, s/p hypertonic saline, course complicated by status epilepticus and intubated for airway protection, subsequently transferred to [facility] for further management, s/p extubation, course c/b dysphagia and failed S&S eval s/p PEG tube placement, now with septic shock of unclear etiology, possibly aspiration vs catheter infection.

## 2020-01-03 NOTE — PROGRESS NOTE ADULT - PROBLEM SELECTOR PLAN 10
Transitions of Care Status:  1.  Name of PCP: Jamie Corral  2.  PCP Contacted on Admission: [ ] Y    [ ] N    3.  PCP contacted at Discharge: [ ] Y    [ ] N    [ ] N/A  4.  Post-Discharge Appointment Date and Location:  5.  Summary of Handoff given to PCP:

## 2020-01-03 NOTE — PROGRESS NOTE ADULT - ASSESSMENT
ASSESSMENT/PLAN: 66 y/o M with AMS in the setting of ?SAH    NEURO:  Q1 neurochecks  CT head for stability 1/4/2020 (SAH vs contrast staining)  Hold all AC/ antiplt for now   KCentra, DDAVP  Continue AEDs (keppra/vimpat)  Will need conventional angio to r/o aneurysm  NSGY consulted  Stroke neurology following re: L basal ganglia stroke.   MRI reviewed  DC melatonin  Activity: [] mobilize as tolerated [x ] Bedrest [] PT [] OT [] PMNR    PULM:  ABG reviewed. Not hypoxemic.   Compensated  Monitor CXR. ABG    CV: CAD s/p stent (placed 6 months ago)  HIstory of Afib on Eliquis  SBP goal <140 in setting of possible SAH  Dasia  ECHO EF 60-65%  Baseline EKG  Troponin 1153      RENAL:  Fluids: ESRD on HD  No anuric. Does make urine (600cc today)    GI:  Diet: NPO **  GI prophylaxis [] not indicated [x] PPI [] other:  Bowel regimen [] colace [x] senna [] other: miralax    ENDO:   Goal euglycemia (-180)  Insulin gtt    HEME/ONC:  Hb stable  DDVAP, KCentra in setting of SAH   On Eliquis, plavix.  DC'ed.  VTE prophylaxis: [x] SCDs [] chemoprophylaxis [x] hold chemoprophylaxis due to: fresh post op  [x] high risk of DVT/PE on admission due to: stroke, immobility  LE dopplers 12/20: No DVT    ID:   Leukocytosis- rule out sepsis. No overt source of infection.   UA negative, Blood cultures thus far NGTD  Check Procal  Continue abx    MISC:    SOCIAL/FAMILY:  [] awaiting [x] updated at bedside [] family meeting    CODE STATUS:  [x] Full Code [] DNR [] DNI [] Palliative/Comfort Care      DISPOSITION:  [x] ICU [] Stroke Unit [] Floor [] EMU [] RCU [] PCU    [x] Patient is at high risk of neurologic deterioration/death due to: hemorrhage, seizures      Time spent: 45 critical care minutes    Contact: [phone] ASSESSMENT/PLAN: 64 y/o M with AMS in the setting of possible SAH.     NEURO:  Q1 neurochecks  CT head for stability 1/4/2020 (SAH vs contrast staining)  Hold all AC/ antiplt for now   KCentra given  Continue AEDs (keppra/vimpat)  Will need conventional angio to r/o aneurysm  NSGY consulted  Stroke neurology following re: L basal ganglia stroke.   MRI reviewed  DC melatonin  Activity: [] mobilize as tolerated [x ] Bedrest [] PT [] OT [] PMNR    PULM:  ABG reviewed. Not hypoxemic.   Compensated  CTA chest 12/24- neg for PNA    CV: CAD s/p stent (placed 6 months ago)  HIstory of Afib on Eliquis  SBP goal <140 in setting of possible SAH  Dasia  ECHO EF 60-65%  Baseline EKG  Troponin 1153. No EKG changes  Trend trops. Repeat ECHO ordered      RENAL:  Fluids: ESRD on HD  No anuric. Does make urine (600cc today)    GI:  Diet: PEG feeds  GI prophylaxis [] not indicated [x] PPI [] other:  Bowel regimen [] colace [x] senna [] other: miralax    ENDO:   Goal euglycemia (-180)  Insulin gtt    HEME/ONC:  Hb stable  S/P KCentra in setting of SAH. Monitor coags  On Eliquis, plavix.  DC'ed.  VTE prophylaxis: [x] SCDs [] chemoprophylaxis [x] hold chemoprophylaxis due to: fresh post op  [x] high risk of DVT/PE on admission due to: stroke, immobility  LE dopplers 12/20: No DVT    ID:   Leukocytosis- rule out sepsis. No overt source of infection.  CT C/A/P 12/24 wnl   UA negative, Blood cultures thus far NGTD  Check Procal  Continue abx    MISC:    SOCIAL/FAMILY:  [] awaiting [x] updated at bedside [] family meeting    CODE STATUS:  [x] Full Code [] DNR [] DNI [] Palliative/Comfort Care      DISPOSITION:  [x] ICU [] Stroke Unit [] Floor [] EMU [] RCU [] PCU    [x] Patient is at high risk of neurologic deterioration/death due to: hemorrhage, seizures      Time spent: 45 critical care minutes    Contact: [phone] ASSESSMENT/PLAN: 64 y/o M with AMS in the setting of possible SAH. vs contrast uptake in previous infarcted tissue     NEURO:  Q1 neurochecks  CT head for stability 1/4/2020 (SAH vs contrast staining)  Hold all AC/ antiplt for now till F/U CT  KCentra given   Continue AEDs (keppra/vimpat)  If no clearance of heme may need  conventional angio to r/o Vascular source  NSGY consulted  Stroke neurology following re: L basal ganglia stroke.   MRI reviewed- watershed infarcts on prior study  DC melatonin  Activity: [] mobilize as tolerated [x ] Bedrest [] PT [] OT [] PMNR    PULM:  ABG reviewed. Not hypoxemic.   Compensated  CTA chest 12/24- neg for PNA    CV: CAD s/p stent (placed 6 months ago)  HIstory of Afib on Eliquis- Last dose at 0600 today , if contrast markedly improved will consider restarting eloquis  SBP goal <140 in setting of possible SAH  Dasia  ECHO EF 60-65%  Baseline EKG  Troponin 1153. No EKG changes  Trend trops. Repeat ECHO ordered      RENAL:  Fluids: ESRD on HD  No anuric. Does make urine (600cc today)    GI:  Diet: PEG feeds  GI prophylaxis [] not indicated [x] PPI [] other:  Bowel regimen [] colace [x] senna [] other: miralax    ENDO:   Goal euglycemia (-180)  Insulin gtt    HEME/ONC:  Hb stable  S/P KCentra in setting of SAH. Monitor coags  On Eliquis, plavix.  DC'ed.  VTE prophylaxis: [x] SCDs [] chemoprophylaxis [x] hold chemoprophylaxis due to: fresh post op  [x] high risk of DVT/PE on admission due to: stroke, immobility  LE dopplers 12/20: No DVT    ID:   Leukocytosis- rule out sepsis. No overt source of infection.  CT C/A/P 12/24 wnl   UA negative, Blood cultures thus far NGTD  Check Procal  Continue abx    MISC:    SOCIAL/FAMILY:  [] awaiting [x] updated at bedside [] family meeting    CODE STATUS:  [x] Full Code [] DNR [] DNI [] Palliative/Comfort Care      DISPOSITION:  [x] ICU [] Stroke Unit [] Floor [] EMU [] RCU [] PCU    [x] Patient is at high risk of neurologic deterioration/death due to: hemorrhage, seizures      Time spent: 45 critical care minutes    Contact: [phone]

## 2020-01-03 NOTE — CONSULT NOTE ADULT - SUBJECTIVE AND OBJECTIVE BOX
p ([id number])     HPI:  Patient is a 64 year old male with a history of CAD s/p stents 2009 and 2019, HTN, DM, Carotid stenosis, Hypothyroidism, and ESRD on HD who presented to outside hospital with altered mental status as he was unresponsive, aphasic, and not moving any extremities.  Was treated with tPA and admitted for observation.  Patient noted to have seizure during hospital course and was started on Keppra.  MRI brain was done which showed bilateral supratentorial and infratentorial infarcts.  Patient was undergoing dialysis when he developed A. fib and was intubated for airway protection.  Neurology following and multiple EEGs were done which showed patient in status epilepticus despite multiple antiepilectics.  Patient now transferred to [facility] for further management. (19 Dec 2019 16:37)      Imaging:  Diffuse SAH    Exam:  AOx2, FC, PERRL, EOMI, no facial   LUE, HG: 3/5 otherwise 0/5 in LUE and RUE (baseline) b/l  LE atleast AG , no drift  SILT  no clonus    --Anticoagulation:  prothrombin complex concentrate IVPB (KCENTRA) 2000 International Unit(s) IV Intermittent once    =====================  PAST MEDICAL HISTORY   Hypothyroidism  CRI (Chronic Renal Insufficiency)  CAD (Coronary Artery Disease)  Dyslipidemia  Diabetes  Hypertension    PAST SURGICAL HISTORY   History of insertion of stent into coronary artery bypass graft  No significant past surgical history        MEDICATIONS:  Antibiotics:    Neuro:  acetaminophen    Suspension .. 650 milliGRAM(s) Oral every 4 hours PRN  lacosamide Solution 50 milliGRAM(s) Oral two times a day  levETIRAcetam  Solution 250 milliGRAM(s) Oral two times a day  levETIRAcetam  Solution 125 milliGRAM(s) Oral daily  melatonin 3 milliGRAM(s) Oral at bedtime  metoclopramide Injectable 10 milliGRAM(s) IV Push every 8 hours PRN    Other:  albuterol/ipratropium for Nebulization 3 milliLiter(s) Nebulizer every 6 hours  atorvastatin 80 milliGRAM(s) Oral at bedtime  bisacodyl Suppository 10 milliGRAM(s) Rectal daily PRN  calcium acetate 667 milliGRAM(s) Oral three times a day with meals  desmopressin IVPB 26 MICROGram(s) IV Intermittent once  dextrose 40% Gel 15 Gram(s) Oral once PRN  dextrose 5%. 1000 milliLiter(s) IV Continuous <Continuous>  dextrose 50% Injectable 12.5 Gram(s) IV Push once  dextrose 50% Injectable 25 Gram(s) IV Push once  dextrose 50% Injectable 25 Gram(s) IV Push once  glucagon  Injectable 1 milliGRAM(s) IntraMuscular once PRN  guaiFENesin   Syrup  (Sugar-Free) 200 milliGRAM(s) Oral every 6 hours PRN  insulin lispro (HumaLOG) corrective regimen sliding scale   SubCutaneous every 6 hours  levothyroxine 50 MICROGram(s) Oral daily  midodrine. 10 milliGRAM(s) Oral every 8 hours  pantoprazole   Suspension 40 milliGRAM(s) Oral two times a day  polyethylene glycol 3350 17 Gram(s) Oral daily  senna Syrup 10 milliLiter(s) Oral two times a day PRN      SOCIAL HISTORY:   Occupation:   Marital Status:     FAMILY HISTORY:  FH: HTN (hypertension)  No pertinent family history in first degree relatives  No pertinent family history in first degree relatives  No pertinent family history in first degree relatives      ROS: Negative except per HPI    LABS:  PT/INR - ( 03 Jan 2020 08:30 )   PT: 21.7 sec;   INR: 1.85 ratio         PTT - ( 03 Jan 2020 08:30 )  PTT:34.0 sec                        7.2    31.10 )-----------( 365      ( 03 Jan 2020 16:21 )             22.7     01-03    123<L>  |  86<L>  |  45<H>  ----------------------------<  286<H>  3.5   |  21<L>  |  5.99<H>    Ca    8.5      03 Jan 2020 16:21  Phos  4.2     01-03  Mg     2.2     01-03    TPro  6.7  /  Alb  2.6<L>  /  TBili  0.3  /  DBili  x   /  AST  18  /  ALT  10  /  AlkPhos  78  01-03

## 2020-01-03 NOTE — PROGRESS NOTE ADULT - ASSESSMENT
subarachnoid hemorrhage versus contrast staining  - CT Head in AM for stability  - Hold antiplatelets for now  - TTE given elevated troponins  - Seizures: continue antiepileptics  - ESRD: on HD  - UTI: on antibiotics   - Hyperglycemia/DMII, uncontrolled: on insulin gtt subarachnoid hemorrhage versus contrast staining  - CT Head in AM for stability  - Hold antiplatelets for now  - TTE given elevated troponins  - Seizures: continue antiepileptics  - ESRD: on HD  - UTI: on antibiotics   - Hyperglycemia/DMII, uncontrolled: insulin gtt if FS >200 subarachnoid hemorrhage versus contrast staining  - CT Head in AM for stability  - Hold antiplatelets for now  - TTE given elevated troponins  - Seizures: continue antiepileptics  - ESRD: on HD  - Hyponatremia: avoid free water; HD will hopefully correct  - UTI: on antibiotics   - Hyperglycemia/DMII, uncontrolled: insulin gtt if FS >200  - Consider EEG if exam waxes and wanes

## 2020-01-03 NOTE — RAPID RESPONSE TEAM SUMMARY - NSSITUATIONBACKGROUNDRRT_GEN_ALL_CORE
63 yo male with history of CAD s/p stents 2009 and 2019, HTN, DM, Carotid stenosis, Hypothyroidism, and ESRD (on HD) initially admitted to [facility] for AMS characterized by unresponsiveness, found to have new CVA in left basal ganglia; patient got tPA on 12/13/19 and developed hemorrhagic transformation with seizures, eventually transitioning to status epilepticus despite multiple anti-seizure medications. Patient was intubated for airway protection and transferred to [facility] for continued management, including 24-hour EEG. Patient extubated on 12/19. He failed a S&S exam, so was kept NPO with NG tube for tube feeds, and now has a PEG tube. Course has been complicated by hyponatremia that is currently resolved. Patient was being prepared for discharge to rehab when patient was noted to be febrile to 102.2 last night with general malaise, requiring workup for infection. Patient meeting sepsis criteria with fever and subsequent hypotension, for which he received midodrine. RRT called for hypotension 80/50s. Patient appears more lethargic, lungs CTAB, RADHA. Repeat BP MAP > 65. Patient's chart reviewed febrile for 3 days despite Vancomycin and Zosyn. Aferile, glucose appropriate 63 yo male with history of CAD s/p stents 2009 and 2019, HTN, DM, Carotid stenosis, Hypothyroidism, and ESRD (on HD) initially admitted to [facility] for AMS characterized by unresponsiveness, found to have new CVA in left basal ganglia; patient got tPA on 12/13/19 and developed hemorrhagic transformation with seizures, eventually transitioning to status epilepticus despite multiple anti-seizure medications. Patient was intubated for airway protection and transferred to [facility] for continued management, including 24-hour EEG. Patient extubated on 12/19. He failed a S&S exam, so was kept NPO with NG tube for tube feeds, and now has a PEG tube. Patient met sepsis criteria with fever and subsequent hypotension, for which he received midodrine. RRT called for hypotension 80/50s. Patient appears more lethargic, lungs CTAB, RADHA. Repeat BP MAP > 65. Patient's chart reviewed febrile for 3 days despite Vancomycin and Zosyn. Afebrile, glucose appropriate

## 2020-01-03 NOTE — PROGRESS NOTE ADULT - SUBJECTIVE AND OBJECTIVE BOX
CC: f/u for fever    Patient reports: he is awake but not able to follow commands    REVIEW OF SYSTEMS:  All other review of systems negative (Comprehensive ROS): tolerating enteral feeds,not dyspneic,limited by underlying condition.    Antimicrobials Day #  :day 2  piperacillin/tazobactam IVPB.. 3.375 Gram(s) IV Intermittent every 12 hours    Other Medications Reviewed  MEDICATIONS  (STANDING):  albuterol/ipratropium for Nebulization 3 milliLiter(s) Nebulizer every 6 hours  apixaban 5 milliGRAM(s) Oral every 12 hours  atorvastatin 80 milliGRAM(s) Oral at bedtime  calcium acetate 667 milliGRAM(s) Oral three times a day with meals  clopidogrel Tablet 75 milliGRAM(s) Oral daily  dextrose 5%. 1000 milliLiter(s) (50 mL/Hr) IV Continuous <Continuous>  dextrose 50% Injectable 12.5 Gram(s) IV Push once  dextrose 50% Injectable 25 Gram(s) IV Push once  dextrose 50% Injectable 25 Gram(s) IV Push once  insulin lispro (HumaLOG) corrective regimen sliding scale   SubCutaneous every 6 hours  lacosamide Solution 50 milliGRAM(s) Oral two times a day  levETIRAcetam  Solution 250 milliGRAM(s) Oral two times a day  levETIRAcetam  Solution 125 milliGRAM(s) Oral daily  levothyroxine 50 MICROGram(s) Oral daily  melatonin 3 milliGRAM(s) Oral at bedtime  midodrine. 10 milliGRAM(s) Oral every 8 hours  pantoprazole   Suspension 40 milliGRAM(s) Oral two times a day  polyethylene glycol 3350 17 Gram(s) Oral daily  sodium chloride 0.9% Bolus 500 milliLiter(s) IV Bolus once    T(F): 98.8 (20 @ 10:00), Max: 102.2 (20 @ 13:30)  HR: 78 (20 @ 10:00)  BP: 102/47 (20 @ 10:00)  RR: 18 (20 @ 10:00)  SpO2: 99% (20 @ 10:00)  Wt(kg): --    PHYSICAL EXAM:  General: withdrawn, no acute distress  Eyes:  anicteric, no conjunctival injection, no discharge  Oropharynx: no lesions or injection 	  Neck: supple, without adenopathy  Lungs: clear to auscultation, diminished at bases  Heart: regular rate and rhythm; no murmur, rubs or gallops  Abdomen: soft, distended, nontender, peg  Skin: no lesions  Extremities: no clubbing, cyanosis, or edema  Neurologic: withdrawn, not very interactive    LAB RESULTS:                        8.6    31.10 )-----------( 423      ( 2020 08:33 )             27.2     01-    127<L>  |  87<L>  |  39<H>  ----------------------------<  256<H>  3.8   |  23  |  5.22<H>    Ca    8.8      2020 06:57  Phos  3.8     -  Mg     2.1     -    TPro  7.5  /  Alb  2.8<L>  /  TBili  0.4  /  DBili  x   /  AST  24  /  ALT  11  /  AlkPhos  93      LIVER FUNCTIONS - ( 2020 06:57 )  Alb: 2.8 g/dL / Pro: 7.5 g/dL / ALK PHOS: 93 U/L / ALT: 11 U/L / AST: 24 U/L / GGT: x           Urinalysis Basic - ( 2020 20:34 )    Color: Light Yellow / Appearance: Slightly Turbid / S.012 / pH: x  Gluc: x / Ketone: Negative  / Bili: Negative / Urobili: Negative   Blood: x / Protein: 300 mg/dL / Nitrite: Negative   Leuk Esterase: Moderate / RBC: 1 /hpf / WBC 46 /HPF   Sq Epi: x / Non Sq Epi: 0 /hpf / Bacteria: Many      MICROBIOLOGY:  RECENT CULTURES:   @ 04:04 .Blood Blood-Peripheral     No growth to date.       @ 00:54 .Blood Blood-Peripheral     No growth to date.          RADIOLOGY REVIEWED:    < from: CT Abdomen and Pelvis w/ Oral Cont and w/ IV Cont (20 @ 16:50) >  IMPRESSION:     No bowel obstruction.    No CT evidence of acute intra-abdominal or intrathoracic pathology.    < end of copied text >

## 2020-01-03 NOTE — PROVIDER CONTACT NOTE (CHANGE IN STATUS NOTIFICATION) - BACKGROUND
pt admitted left basal ganglia infarct  with TPA given and seizure, hx of HTN,CAD,DM,CAROTID STENOSIS and stent 2009 and 2019, afib with apixaban

## 2020-01-03 NOTE — CONSULT NOTE ADULT - PROVIDER SPECIALTY LIST ADULT
Infectious Disease
Internal Medicine
Cardiology
Cardiology
MICU
Nephrology
Neurology
Neurosurgery
Gastroenterology
Pulmonology

## 2020-01-03 NOTE — PROGRESS NOTE ADULT - ASSESSMENT
65 y/o M with PMH of CAD s/p stents 2009 and 2019, HTN, DM, Carotid stenosis, Hypothyroidism, and ESRD on HD who presented to outside hospital with AMS. Found to have new onset Afib and bilateral extensive bihemispheric infarcts. s/p tPA at outside hospital. Hospital course c/b status epilepticus. Intubated for airway protection, extubated 12/21. Called to eval 12/26 for secretions, leukocytosis. S/p PEG placement 12/27. Hospital course c/b acute and dramatic leukocytosis on 1/1 with fevers of yet unclear etiology

## 2020-01-03 NOTE — PROGRESS NOTE ADULT - PROBLEM SELECTOR PLAN 1
- Fever, tachycardia, and uptrending leukocytosis with unclear source, now on midodrine. Possibly related to aspiration given high aspiration risk vs bacteremia vs line infection. Given patient's new urinary changes, would also be concerned for possible UTI despite history of ESRD.   -Will continue Midodrine 10 mg Q8 for now, wean as tolerated.   -RVP negative.  - pt straight cathed for > 300 cc urine last night, UA + for possible infection, pending Urine cultures - > increased possibility of UTI as cause for sepsis  - F/u blood cultures.   - CT chest/abdomen pelvis w/ contrast w/o identifiable source of infection.   - Prior sputum cx positive for MSSA and Enterobacter possible colonizer  - If blood cultures + will need perma-cath removed.  - continue zosyn per ID recs, will d/c vancomycin  - F/u ID recs

## 2020-01-03 NOTE — CHART NOTE - NSCHARTNOTEFT_GEN_A_CORE
Neurology Stroke Service    Case d/w Dr. Crawford, attending stroke neurologist. Unclear if CT head reveals new hemorrhage vs contrast staining (had CT c/a/p with contrast on 1/2).     Recommend:  -holding apixaban for now, given that he has risk factors to have ICH.   -Start ASA 81 for now  -Repeat CT head in 24 hours, and decision to resume apixaban will be made after the repeat CT head.    Matt Shafer MD  Neurology Resident

## 2020-01-03 NOTE — PROGRESS NOTE ADULT - SUBJECTIVE AND OBJECTIVE BOX
HPI:  Patient is a 64 year old male with a history of CAD s/p stents 2009 and 2019, HTN, DM, Carotid stenosis, Hypothyroidism, and ESRD on HD who presented to outside hospital with altered mental status as he was unresponsive, aphasic, and not moving any extremities.  Was treated with tPA and admitted for observation.  Patient noted to have seizure during hospital course and was started on Keppra.  MRI brain was done which showed bilateral supratentorial and infratentorial infarcts.  Patient was undergoing dialysis when he developed A. fib and was intubated for airway protection.  Neurology following and multiple EEGs were done which showed patient in status epilepticus despite multiple antiepilectics.  Patient now transferred to [facility] for further management. (19 Dec 2019 16:37)  Initially, pt admitted to the   Recently being worked up for sepsis.   Rapid response called on 1/3/2020 for altered mental status and hypotension.     On admission, the patient was:  GCS:  Lomeli-Treviño:  modified Ryan:  ICH score:  NIHSS:        ICU Vital Signs Last 24 Hrs  T(C): 37.2 (03 Jan 2020 15:53), Max: 38.7 (03 Jan 2020 06:36)  T(F): 99 (03 Jan 2020 15:53), Max: 101.6 (03 Jan 2020 06:36)  HR: 80 (03 Jan 2020 17:00) (70 - 102)  BP: 109/51 (03 Jan 2020 17:00) (66/48 - 127/61)  BP(mean): 69 (03 Jan 2020 17:00) (55 - 95)  ABP: --  ABP(mean): --  RR: 12 (03 Jan 2020 17:00) (12 - 30)  SpO2: 100% (03 Jan 2020 17:00) (95% - 100%)      01-02-20 @ 07:01  -  01-03-20 @ 07:00  --------------------------------------------------------  IN: 1630 mL / OUT: 1570 mL / NET: 60 mL    01-03-20 @ 07:01  -  01-03-20 @ 17:40  --------------------------------------------------------  IN: 429 mL / OUT: 0 mL / NET: 429 mL        acetaminophen    Suspension .. 650 milliGRAM(s) Oral every 4 hours PRN  albuterol/ipratropium for Nebulization 3 milliLiter(s) Nebulizer every 6 hours  atorvastatin 80 milliGRAM(s) Oral at bedtime  bisacodyl Suppository 10 milliGRAM(s) Rectal daily PRN  calcium acetate 667 milliGRAM(s) Oral three times a day with meals  dextrose 40% Gel 15 Gram(s) Oral once PRN  dextrose 5%. 1000 milliLiter(s) (50 mL/Hr) IV Continuous <Continuous>  dextrose 50% Injectable 12.5 Gram(s) IV Push once  dextrose 50% Injectable 25 Gram(s) IV Push once  dextrose 50% Injectable 25 Gram(s) IV Push once  glucagon  Injectable 1 milliGRAM(s) IntraMuscular once PRN  guaiFENesin   Syrup  (Sugar-Free) 200 milliGRAM(s) Oral every 6 hours PRN  insulin lispro (HumaLOG) corrective regimen sliding scale   SubCutaneous every 6 hours  lacosamide Solution 50 milliGRAM(s) Oral two times a day  levETIRAcetam  Solution 250 milliGRAM(s) Oral two times a day  levETIRAcetam  Solution 125 milliGRAM(s) Oral daily  levothyroxine 50 MICROGram(s) Oral daily  melatonin  milliGRAM(s) Oral at bedtime  metoclopramide Injectable 10 milliGRAM(s) IV Push every 8 hours PRN  midodrine. 10 milliGRAM(s) Oral every 8 hours  pantoprazole   Suspension 40 milliGRAM(s) Oral two times a day  polyethylene glycol 3350 17 Gram(s) Oral daily  prothrombin complex concentrate IVPB (KCENTRA) 2000 International Unit(s) IV Intermittent once  senna Syrup 10 milliLiter(s) Oral two times a day PRN                          7.2    31.10 )-----------( 365      ( 03 Jan 2020 16:21 )             22.7     01-03    123<L>  |  86<L>  |  45<H>  ----------------------------<  286<H>  3.5   |  21<L>  |  5.99<H>    Ca    8.5      03 Jan 2020 16:21  Phos  4.2     01-03  Mg     2.2     01-03    TPro  6.7  /  Alb  2.6<L>  /  TBili  0.3  /  DBili  x   /  AST  18  /  ALT  10  /  AlkPhos  78  01-03    LIVER FUNCTIONS - ( 03 Jan 2020 16:21 )  Alb: 2.6 g/dL / Pro: 6.7 g/dL / ALK PHOS: 78 U/L / ALT: 10 U/L / AST: 18 U/L / GGT: x           ABG - ( 03 Jan 2020 15:57 )  pH, Arterial: 7.44  pH, Blood: x     /  pCO2: 36    /  pO2: 134   / HCO3: 24    / Base Excess: .9    /  SaO2: 99                        EXAMINATION:  General:  calm  HEENT:  MMM  Neuro:    Cards:  RRR  Respiratory:  no respiratory distress  Adomen:  soft  Extremities:  no edema  Skin:  warm/dry HPI:  Patient is a 64 year old male with a history of CAD s/p stents 2009 and 2019, HTN, DM, Carotid stenosis, Hypothyroidism, and ESRD on HD who presented to outside hospital with altered mental status as he was unresponsive, aphasic, and not moving any extremities.  Was treated with tPA and admitted for observation.  Patient noted to have seizure during hospital course and was started on Keppra.  MRI brain was done which showed bilateral supratentorial and infratentorial infarcts.  Patient was undergoing dialysis when he developed A. fib and was intubated for airway protection.  Neurology following and multiple EEGs were done which showed patient in status epilepticus despite multiple antiepilectics.  Patient now transferred to [facility] for further management. (19 Dec 2019 16:37)    Recently being worked up for sepsis.   Rapid response called on 1/3/2020 for altered mental status and hypotension.     On admission, the patient was:  GCS:  Lomeli-Treviño:  modified Ryan:  ICH score:  NIHSS:        ICU Vital Signs Last 24 Hrs  T(C): 37.2 (03 Jan 2020 15:53), Max: 38.7 (03 Jan 2020 06:36)  T(F): 99 (03 Jan 2020 15:53), Max: 101.6 (03 Jan 2020 06:36)  HR: 80 (03 Jan 2020 17:00) (70 - 102)  BP: 109/51 (03 Jan 2020 17:00) (66/48 - 127/61)  BP(mean): 69 (03 Jan 2020 17:00) (55 - 95)  ABP: --  ABP(mean): --  RR: 12 (03 Jan 2020 17:00) (12 - 30)  SpO2: 100% (03 Jan 2020 17:00) (95% - 100%)      01-02-20 @ 07:01  -  01-03-20 @ 07:00  --------------------------------------------------------  IN: 1630 mL / OUT: 1570 mL / NET: 60 mL    01-03-20 @ 07:01  -  01-03-20 @ 17:40  --------------------------------------------------------  IN: 429 mL / OUT: 0 mL / NET: 429 mL        acetaminophen    Suspension .. 650 milliGRAM(s) Oral every 4 hours PRN  albuterol/ipratropium for Nebulization 3 milliLiter(s) Nebulizer every 6 hours  atorvastatin 80 milliGRAM(s) Oral at bedtime  bisacodyl Suppository 10 milliGRAM(s) Rectal daily PRN  calcium acetate 667 milliGRAM(s) Oral three times a day with meals  dextrose 40% Gel 15 Gram(s) Oral once PRN  dextrose 5%. 1000 milliLiter(s) (50 mL/Hr) IV Continuous <Continuous>  dextrose 50% Injectable 12.5 Gram(s) IV Push once  dextrose 50% Injectable 25 Gram(s) IV Push once  dextrose 50% Injectable 25 Gram(s) IV Push once  glucagon  Injectable 1 milliGRAM(s) IntraMuscular once PRN  guaiFENesin   Syrup  (Sugar-Free) 200 milliGRAM(s) Oral every 6 hours PRN  insulin lispro (HumaLOG) corrective regimen sliding scale   SubCutaneous every 6 hours  lacosamide Solution 50 milliGRAM(s) Oral two times a day  levETIRAcetam  Solution 250 milliGRAM(s) Oral two times a day  levETIRAcetam  Solution 125 milliGRAM(s) Oral daily  levothyroxine 50 MICROGram(s) Oral daily  melatonin  milliGRAM(s) Oral at bedtime  metoclopramide Injectable 10 milliGRAM(s) IV Push every 8 hours PRN  midodrine. 10 milliGRAM(s) Oral every 8 hours  pantoprazole   Suspension 40 milliGRAM(s) Oral two times a day  polyethylene glycol 3350 17 Gram(s) Oral daily  prothrombin complex concentrate IVPB (KCENTRA) 2000 International Unit(s) IV Intermittent once  senna Syrup 10 milliLiter(s) Oral two times a day PRN                          7.2    31.10 )-----------( 365      ( 03 Jan 2020 16:21 )             22.7     01-03    123<L>  |  86<L>  |  45<H>  ----------------------------<  286<H>  3.5   |  21<L>  |  5.99<H>    Ca    8.5      03 Jan 2020 16:21  Phos  4.2     01-03  Mg     2.2     01-03    TPro  6.7  /  Alb  2.6<L>  /  TBili  0.3  /  DBili  x   /  AST  18  /  ALT  10  /  AlkPhos  78  01-03    LIVER FUNCTIONS - ( 03 Jan 2020 16:21 )  Alb: 2.6 g/dL / Pro: 6.7 g/dL / ALK PHOS: 78 U/L / ALT: 10 U/L / AST: 18 U/L / GGT: x           ABG - ( 03 Jan 2020 15:57 )  pH, Arterial: 7.44  pH, Blood: x     /  pCO2: 36    /  pO2: 134   / HCO3: 24    / Base Excess: .9    /  SaO2: 99                        EXAMINATION:  General:  calm  HEENT:  MMM  Neuro:    Cards:  RRR  Respiratory:  no respiratory distress  Adomen:  soft  Extremities:  no edema  Skin:  warm/dry HPI:  Patient is a 64 year old male with a history of CAD s/p stents 2009 and 2019, HTN, DM, Carotid stenosis, Hypothyroidism, and ESRD on HD who presented to outside hospital with altered mental status as he was unresponsive, aphasic, and not moving any extremities.  Was treated with tPA and admitted for observation.  Patient noted to have seizure during hospital course and was started on Keppra.  MRI brain was done which showed bilateral supratentorial and infratentorial infarcts.  Patient was undergoing dialysis when he developed A. fib and was intubated for airway protection.  Neurology following and multiple EEGs were done which showed patient in status epilepticus despite multiple antiepilectics.  Patient transferred to [facility] for further management. (19 Dec 2019 16:37)  Was initially in NSCU with status epilepticus. Improved. [facility]   Recently being worked up for sepsis.   Rapid response called on 1/3/2020 for altered mental status and hypotension.     On admission, the patient was:  GCS:  Lomeli-Treviño:  modified Ryan:  ICH score:  NIHSS:        ICU Vital Signs Last 24 Hrs  T(C): 37.2 (03 Jan 2020 15:53), Max: 38.7 (03 Jan 2020 06:36)  T(F): 99 (03 Jan 2020 15:53), Max: 101.6 (03 Jan 2020 06:36)  HR: 80 (03 Jan 2020 17:00) (70 - 102)  BP: 109/51 (03 Jan 2020 17:00) (66/48 - 127/61)  BP(mean): 69 (03 Jan 2020 17:00) (55 - 95)  ABP: --  ABP(mean): --  RR: 12 (03 Jan 2020 17:00) (12 - 30)  SpO2: 100% (03 Jan 2020 17:00) (95% - 100%)      01-02-20 @ 07:01  -  01-03-20 @ 07:00  --------------------------------------------------------  IN: 1630 mL / OUT: 1570 mL / NET: 60 mL    01-03-20 @ 07:01  -  01-03-20 @ 17:40  --------------------------------------------------------  IN: 429 mL / OUT: 0 mL / NET: 429 mL        acetaminophen    Suspension .. 650 milliGRAM(s) Oral every 4 hours PRN  albuterol/ipratropium for Nebulization 3 milliLiter(s) Nebulizer every 6 hours  atorvastatin 80 milliGRAM(s) Oral at bedtime  bisacodyl Suppository 10 milliGRAM(s) Rectal daily PRN  calcium acetate 667 milliGRAM(s) Oral three times a day with meals  dextrose 40% Gel 15 Gram(s) Oral once PRN  dextrose 5%. 1000 milliLiter(s) (50 mL/Hr) IV Continuous <Continuous>  dextrose 50% Injectable 12.5 Gram(s) IV Push once  dextrose 50% Injectable 25 Gram(s) IV Push once  dextrose 50% Injectable 25 Gram(s) IV Push once  glucagon  Injectable 1 milliGRAM(s) IntraMuscular once PRN  guaiFENesin   Syrup  (Sugar-Free) 200 milliGRAM(s) Oral every 6 hours PRN  insulin lispro (HumaLOG) corrective regimen sliding scale   SubCutaneous every 6 hours  lacosamide Solution 50 milliGRAM(s) Oral two times a day  levETIRAcetam  Solution 250 milliGRAM(s) Oral two times a day  levETIRAcetam  Solution 125 milliGRAM(s) Oral daily  levothyroxine 50 MICROGram(s) Oral daily  melatonin  milliGRAM(s) Oral at bedtime  metoclopramide Injectable 10 milliGRAM(s) IV Push every 8 hours PRN  midodrine. 10 milliGRAM(s) Oral every 8 hours  pantoprazole   Suspension 40 milliGRAM(s) Oral two times a day  polyethylene glycol 3350 17 Gram(s) Oral daily  prothrombin complex concentrate IVPB (KCENTRA) 2000 International Unit(s) IV Intermittent once  senna Syrup 10 milliLiter(s) Oral two times a day PRN                          7.2    31.10 )-----------( 365      ( 03 Jan 2020 16:21 )             22.7     01-03    123<L>  |  86<L>  |  45<H>  ----------------------------<  286<H>  3.5   |  21<L>  |  5.99<H>    Ca    8.5      03 Jan 2020 16:21  Phos  4.2     01-03  Mg     2.2     01-03    TPro  6.7  /  Alb  2.6<L>  /  TBili  0.3  /  DBili  x   /  AST  18  /  ALT  10  /  AlkPhos  78  01-03    LIVER FUNCTIONS - ( 03 Jan 2020 16:21 )  Alb: 2.6 g/dL / Pro: 6.7 g/dL / ALK PHOS: 78 U/L / ALT: 10 U/L / AST: 18 U/L / GGT: x           ABG - ( 03 Jan 2020 15:57 )  pH, Arterial: 7.44  pH, Blood: x     /  pCO2: 36    /  pO2: 134   / HCO3: 24    / Base Excess: .9    /  SaO2: 99            EXAMINATION:  General:  calm  HEENT:  MM slightly dry  Neuro:  hypoalert but arousable, oriented x 3, localizing briskly on LUE, trace movement on RUE. B/L LE AG movements with knee flexion when noxious stim applied.   Cards:  RRR  Respiratory:  no respiratory distress  Abomen:  soft  Extremities:  no edema  Skin:  warm/dry. No erythema. Permacath site C/D/I HPI:  Patient is a 64 year old male with a history of CAD s/p stents 2009 and 2019, HTN, DM, Carotid stenosis, Hypothyroidism, and ESRD on HD who presented to outside hospital with altered mental status as he was unresponsive, aphasic, and not moving any extremities.  Was treated with tPA and admitted for observation.  Patient noted to have seizure during hospital course and was started on Keppra.  MRI brain was done which showed bilateral supratentorial and infratentorial infarcts.  Patient was undergoing dialysis when he developed A. fib and was intubated for airway protection.  Neurology following and multiple EEGs were done which showed patient in status epilepticus despite multiple antiepilectics.  Patient transferred to [facility] for further management. (19 Dec 2019 16:37)  Was initially in NSCU with status epilepticus. Improved. [facility]   Recently being worked up for sepsis.   Rapid response called on 1/3/2020 for altered mental status and hypotension.     On admission, the patient was:  GCS:  Lomeli-Treviño:  modified Ryan:  ICH score:  NIHSS:        ICU Vital Signs Last 24 Hrs  T(C): 37.2 (03 Jan 2020 15:53), Max: 38.7 (03 Jan 2020 06:36)  T(F): 99 (03 Jan 2020 15:53), Max: 101.6 (03 Jan 2020 06:36)  HR: 80 (03 Jan 2020 17:00) (70 - 102)  BP: 109/51 (03 Jan 2020 17:00) (66/48 - 127/61)  BP(mean): 69 (03 Jan 2020 17:00) (55 - 95)  ABP: --  ABP(mean): --  RR: 12 (03 Jan 2020 17:00) (12 - 30)  SpO2: 100% (03 Jan 2020 17:00) (95% - 100%)      01-02-20 @ 07:01  -  01-03-20 @ 07:00  --------------------------------------------------------  IN: 1630 mL / OUT: 1570 mL / NET: 60 mL    01-03-20 @ 07:01  -  01-03-20 @ 17:40  --------------------------------------------------------  IN: 429 mL / OUT: 0 mL / NET: 429 mL        acetaminophen    Suspension .. 650 milliGRAM(s) Oral every 4 hours PRN  albuterol/ipratropium for Nebulization 3 milliLiter(s) Nebulizer every 6 hours  atorvastatin 80 milliGRAM(s) Oral at bedtime  bisacodyl Suppository 10 milliGRAM(s) Rectal daily PRN  calcium acetate 667 milliGRAM(s) Oral three times a day with meals  dextrose 40% Gel 15 Gram(s) Oral once PRN  dextrose 5%. 1000 milliLiter(s) (50 mL/Hr) IV Continuous <Continuous>  dextrose 50% Injectable 12.5 Gram(s) IV Push once  dextrose 50% Injectable 25 Gram(s) IV Push once  dextrose 50% Injectable 25 Gram(s) IV Push once  glucagon  Injectable 1 milliGRAM(s) IntraMuscular once PRN  guaiFENesin   Syrup  (Sugar-Free) 200 milliGRAM(s) Oral every 6 hours PRN  insulin lispro (HumaLOG) corrective regimen sliding scale   SubCutaneous every 6 hours  lacosamide Solution 50 milliGRAM(s) Oral two times a day  levETIRAcetam  Solution 250 milliGRAM(s) Oral two times a day  levETIRAcetam  Solution 125 milliGRAM(s) Oral daily  levothyroxine 50 MICROGram(s) Oral daily  melatonin  milliGRAM(s) Oral at bedtime  metoclopramide Injectable 10 milliGRAM(s) IV Push every 8 hours PRN  midodrine. 10 milliGRAM(s) Oral every 8 hours  pantoprazole   Suspension 40 milliGRAM(s) Oral two times a day  polyethylene glycol 3350 17 Gram(s) Oral daily  prothrombin complex concentrate IVPB (KCENTRA) 2000 International Unit(s) IV Intermittent once  senna Syrup 10 milliLiter(s) Oral two times a day PRN                          7.2    31.10 )-----------( 365      ( 03 Jan 2020 16:21 )             22.7     01-03    123<L>  |  86<L>  |  45<H>  ----------------------------<  286<H>  3.5   |  21<L>  |  5.99<H>    Ca    8.5      03 Jan 2020 16:21  Phos  4.2     01-03  Mg     2.2     01-03    CAPILLARY BLOOD GLUCOSE      POCT Blood Glucose.: 271 mg/dL (03 Jan 2020 17:32)  POCT Blood Glucose.: 288 mg/dL (03 Jan 2020 15:34)  POCT Blood Glucose.: 283 mg/dL (03 Jan 2020 11:46)  POCT Blood Glucose.: 274 mg/dL (03 Jan 2020 06:04)  POCT Blood Glucose.: 237 mg/dL (02 Jan 2020 23:28)    TPro  6.7  /  Alb  2.6<L>  /  TBili  0.3  /  DBili  x   /  AST  18  /  ALT  10  /  AlkPhos  78  01-03    LIVER FUNCTIONS - ( 03 Jan 2020 16:21 )  Alb: 2.6 g/dL / Pro: 6.7 g/dL / ALK PHOS: 78 U/L / ALT: 10 U/L / AST: 18 U/L / GGT: x           ABG - ( 03 Jan 2020 15:57 )  pH, Arterial: 7.44  pH, Blood: x     /  pCO2: 36    /  pO2: 134   / HCO3: 24    / Base Excess: .9    /  SaO2: 99            EXAMINATION:  General:  calm  HEENT:  MM slightly dry  Neuro:  hypoalert but arousable, oriented x 2, localizing briskly on LUE, trace movement on RUE. B/L LE AG movements with knee flexion when noxious stim applied.   Cards:  RRR  Respiratory:  no respiratory distress  Abomen:  soft  Extremities:  no edema  Skin:  warm/dry. No erythema. Permacath site C/D/I HPI:  Patient is a 64 year old male with a history of CAD s/p stents 2009 and 2019, HTN, DM, Carotid stenosis, Hypothyroidism, and ESRD on HD who presented to outside hospital with altered mental status as he was unresponsive, aphasic, and not moving any extremities.  Was treated with tPA and admitted for observation.  Patient noted to have seizure during hospital course and was started on Keppra.  MRI brain was done which showed bilateral supratentorial and infratentorial infarcts.  Patient was undergoing dialysis when he developed A. fib and was intubated for airway protection.  Neurology following and multiple EEGs were done which showed patient in status epilepticus despite multiple antiepilectics.  Patient transferred to [facility] for further management. (19 Dec 2019 16:37)  Was initially in NSCU with status epilepticus. Improved. [facility]   Recently being worked up for sepsis.   Rapid response called on 1/3/2020 for altered mental status and hypotension.     On admission, the patient was:  GCS:  Lomeli-Treviño:  modified Ryan:  ICH score:  NIHSS:      ICU Vital Signs Last 24 Hrs  T(C): 37.2 (04 Jan 2020 07:00), Max: 37.9 (03 Jan 2020 12:00)  T(F): 99 (04 Jan 2020 07:00), Max: 100.2 (03 Jan 2020 12:00)  HR: 85 (04 Jan 2020 09:00) (70 - 94)  BP: 115/57 (04 Jan 2020 09:00) (66/48 - 123/59)  BP(mean): 75 (04 Jan 2020 09:00) (55 - 78)  ABP: --  ABP(mean): --  RR: 18 (04 Jan 2020 09:00) (12 - 27)  SpO2: 100% (04 Jan 2020 09:00) (95% - 100%)      01-03-20 @ 07:01  -  01-04-20 @ 07:00  --------------------------------------------------------  IN: 2461.2 mL / OUT: 350 mL / NET: 2111.2 mL    01-04-20 @ 07:01  -  01-04-20 @ 09:44  --------------------------------------------------------  IN: 50 mL / OUT: 0 mL / NET: 50 mL        acetaminophen    Suspension .. 650 milliGRAM(s) Oral every 4 hours PRN  albuterol/ipratropium for Nebulization 3 milliLiter(s) Nebulizer every 6 hours  atorvastatin 80 milliGRAM(s) Oral at bedtime  calcium acetate 667 milliGRAM(s) Oral three times a day with meals  chlorhexidine 4% Liquid 1 Application(s) Topical <User Schedule>  dextrose 40% Gel 15 Gram(s) Oral once PRN  dextrose 5%. 1000 milliLiter(s) (50 mL/Hr) IV Continuous <Continuous>  dextrose 50% Injectable 12.5 Gram(s) IV Push once  dextrose 50% Injectable 25 Gram(s) IV Push once  dextrose 50% Injectable 25 Gram(s) IV Push once  glucagon  Injectable 1 milliGRAM(s) IntraMuscular once PRN  guaiFENesin   Syrup  (Sugar-Free) 200 milliGRAM(s) Oral every 6 hours PRN  insulin lispro (HumaLOG) corrective regimen sliding scale   SubCutaneous every 6 hours  lacosamide Solution 50 milliGRAM(s) Oral two times a day  levETIRAcetam  Solution 250 milliGRAM(s) Oral two times a day  levETIRAcetam  Solution 125 milliGRAM(s) Oral daily  levothyroxine 50 MICROGram(s) Oral daily  meropenem  IVPB 1000 milliGRAM(s) IV Intermittent every 24 hours  metoclopramide Injectable 10 milliGRAM(s) IV Push every 8 hours PRN  midodrine. 10 milliGRAM(s) Oral every 8 hours  pantoprazole   Suspension 40 milliGRAM(s) Oral two times a day  sodium chloride 0.9%. 1000 milliLiter(s) (50 mL/Hr) IV Continuous <Continuous>                          7.9    26.85 )-----------( 324      ( 03 Jan 2020 23:04 )             26.9     01-04    126<L>  |  88<L>  |  48<H>  ----------------------------<  162<H>  3.9   |  19<L>  |  6.62<H>    Ca    8.9      04 Jan 2020 01:54  Phos  4.4     01-04  Mg     2.2     01-04    TPro  6.7  /  Alb  2.6<L>  /  TBili  0.3  /  DBili  x   /  AST  18  /  ALT  10  /  AlkPhos  78  01-03    LIVER FUNCTIONS - ( 03 Jan 2020 16:21 )  Alb: 2.6 g/dL / Pro: 6.7 g/dL / ALK PHOS: 78 U/L / ALT: 10 U/L / AST: 18 U/L / GGT: x           ABG - ( 03 Jan 2020 15:57 )  pH, Arterial: 7.44  pH, Blood: x     /  pCO2: 36    /  pO2: 134   / HCO3: 24    / Base Excess: .9    /  SaO2: 99                                  7.2    31.10 )-----------( 365      ( 03 Jan 2020 16:21 )             22.7     01-03    123<L>  |  86<L>  |  45<H>  ----------------------------<  286<H>  3.5   |  21<L>  |  5.99<H>    Ca    8.5      03 Jan 2020 16:21  Phos  4.2     01-03  Mg     2.2     01-03    CAPILLARY BLOOD GLUCOSE      POCT Blood Glucose.: 271 mg/dL (03 Jan 2020 17:32)  POCT Blood Glucose.: 288 mg/dL (03 Jan 2020 15:34)  POCT Blood Glucose.: 283 mg/dL (03 Jan 2020 11:46)  POCT Blood Glucose.: 274 mg/dL (03 Jan 2020 06:04)  POCT Blood Glucose.: 237 mg/dL (02 Jan 2020 23:28)    TPro  6.7  /  Alb  2.6<L>  /  TBili  0.3  /  DBili  x   /  AST  18  /  ALT  10  /  AlkPhos  78  01-03    LIVER FUNCTIONS - ( 03 Jan 2020 16:21 )  Alb: 2.6 g/dL / Pro: 6.7 g/dL / ALK PHOS: 78 U/L / ALT: 10 U/L / AST: 18 U/L / GGT: x           ABG - ( 03 Jan 2020 15:57 )  pH, Arterial: 7.44  pH, Blood: x     /  pCO2: 36    /  pO2: 134   / HCO3: 24    / Base Excess: .9    /  SaO2: 99            EXAMINATION:  General:  calm  HEENT:  MM slightly dry  Neuro:  hypoalert but arousable, oriented x 2, localizing briskly on LUE, trace movement on RUE. B/L LE AG movements with knee flexion when noxious stim applied.   Cards:  RRR  Respiratory:  no respiratory distress  Abomen:  soft  Extremities:  no edema  Skin:  warm/dry. No erythema. Permacath site C/D/I

## 2020-01-03 NOTE — PROGRESS NOTE ADULT - PROBLEM SELECTOR PLAN 4
S/p CARLA in June 2019 c/b ST Depressions. Cards following. TTE in 12/2019 shows normal LVSF and trace MR.   Plavix in setting of history of CAD and in setting of carotid stenosis.   AC in setting of afib- eliquis 5mg q12hrs  - Holding coreg b/c hypotension.

## 2020-01-03 NOTE — PROGRESS NOTE ADULT - SUBJECTIVE AND OBJECTIVE BOX
Rapid response called on 1/3/2020 for altered mental status and hypotension. CT with diffuse superficial/convexity subarachnoid hemorrhage versus contrast staining. Rapid response called on 1/3/2020 for altered mental status and hypotension. CT with diffuse superficial/convexity subarachnoid hemorrhage versus contrast staining.     Lethargic, requires noxious stimulus to arouse, once awake, abulic but oriented to self, hospital and 2020, delayed commands (eyes open, tongue out), trace right arm w/d, LUE flicker of hand  but no other movement, w/d b/l LEs

## 2020-01-03 NOTE — RAPID RESPONSE TEAM SUMMARY - NSADDTLFINDINGSRRT_GEN_ALL_CORE
EKG - TWI in V4-V6 (unchanged from 1/1, but changed from prior in 12/19)  RVP negative  sputum cx reviewed (thought to be isolates) however patient now hypotensive   PEG and permacath site no evidence of infection   TSH and cortisol reviewed

## 2020-01-03 NOTE — PROGRESS NOTE ADULT - ASSESSMENT
dysphagia  cva  anemia  fever    s/p peg  feeds as tolerated  proton pump inhibitor daily  H/H drop noted; no overt gi bleeding  cbc daily  ID eval noted; cont zosyn, hold vanco, f/u blood cx  will follow     Advanced care planning was discussed with patient and family.  Advanced care planning forms were reviewed and discussed.  Risks, benefits and alternatives of gastroenterologic procedures were discussed in detail and all questions were answered.    30 minutes spent.

## 2020-01-03 NOTE — RAPID RESPONSE TEAM SUMMARY - NSOTHERINTERVENTIONSRRT_GEN_ALL_CORE
CTH (patient on AC with acute mental status change)  F/u blood cultures, UCx   Discuss with ID, given vomiting and CTAP findings  Obtain sputum cx   Make NPO given mental status

## 2020-01-03 NOTE — PROGRESS NOTE ADULT - SUBJECTIVE AND OBJECTIVE BOX
PROGRESS NOTE:   ************************************************  Authored by: Jim Almonte MD PGY1  Internal Medicine  Pager: [facility]: [phone]  *************************************************    Patient is a 64y old  Male who presents with a chief complaint of Seizures (2020 12:46)      SUBJECTIVE / OVERNIGHT EVENTS: The patient was seen and examined at bedside.     REVIEW OF SYSTEMS:  As above, otherwise negative.    MEDICATIONS  (STANDING):  albuterol/ipratropium for Nebulization 3 milliLiter(s) Nebulizer every 6 hours  apixaban 5 milliGRAM(s) Oral every 12 hours  atorvastatin 80 milliGRAM(s) Oral at bedtime  calcium acetate 667 milliGRAM(s) Oral three times a day with meals  clopidogrel Tablet 75 milliGRAM(s) Oral daily  dextrose 5%. 1000 milliLiter(s) (50 mL/Hr) IV Continuous <Continuous>  dextrose 50% Injectable 12.5 Gram(s) IV Push once  dextrose 50% Injectable 25 Gram(s) IV Push once  dextrose 50% Injectable 25 Gram(s) IV Push once  insulin lispro (HumaLOG) corrective regimen sliding scale   SubCutaneous every 6 hours  lacosamide Solution 50 milliGRAM(s) Oral two times a day  levETIRAcetam  Solution 250 milliGRAM(s) Oral two times a day  levETIRAcetam  Solution 125 milliGRAM(s) Oral daily  levothyroxine 50 MICROGram(s) Oral daily  melatonin 3 milliGRAM(s) Oral at bedtime  midodrine. 10 milliGRAM(s) Oral every 8 hours  pantoprazole   Suspension 40 milliGRAM(s) Oral two times a day  piperacillin/tazobactam IVPB.. 3.375 Gram(s) IV Intermittent every 12 hours  polyethylene glycol 3350 17 Gram(s) Oral daily  sodium chloride 0.9% Bolus 500 milliLiter(s) IV Bolus once    MEDICATIONS  (PRN):  acetaminophen    Suspension .. 650 milliGRAM(s) Oral every 4 hours PRN Temp greater or equal to 38C (100.4F), Mild Pain (1 - 3), Moderate Pain (4 - 6), Severe Pain (7 - 10)  bisacodyl Suppository 10 milliGRAM(s) Rectal daily PRN Constipation  dextrose 40% Gel 15 Gram(s) Oral once PRN Blood Glucose LESS THAN 70 milliGRAM(s)/deciLiter  glucagon  Injectable 1 milliGRAM(s) IntraMuscular once PRN Glucose <70 milliGRAM(s)/deciLiter  guaiFENesin   Syrup  (Sugar-Free) 200 milliGRAM(s) Oral every 6 hours PRN Cough  senna Syrup 10 milliLiter(s) Oral two times a day PRN Constipation      CAPILLARY BLOOD GLUCOSE      POCT Blood Glucose.: 283 mg/dL (2020 11:46)  POCT Blood Glucose.: 274 mg/dL (2020 06:04)  POCT Blood Glucose.: 237 mg/dL (2020 23:28)  POCT Blood Glucose.: 157 mg/dL (2020 18:15)    I&O's Summary    2020 07:01  -  2020 07:00  --------------------------------------------------------  IN: 1630 mL / OUT: 1570 mL / NET: 60 mL    2020 07:01  -  2020 14:04  --------------------------------------------------------  IN: 429 mL / OUT: 0 mL / NET: 429 mL        PHYSICAL EXAM:  Vital Signs Last 24 Hrs  T(C): 37.9 (2020 12:00), Max: 38.7 (2020 06:36)  T(F): 100.2 (2020 12:00), Max: 101.6 (2020 06:36)  HR: 81 (2020 12:02) (78 - 102)  BP: 94/48 (2020 12:02) (66/48 - 127/61)  BP(mean): 62 (2020 12:02) (55 - 95)  RR: 25 (2020 12:02) (18 - 30)  SpO2: 97% (2020 12:02) (96% - 100%)    CONSTITUTIONAL: NAD, resting comfortably  RESPIRATORY: Normal respiratory effort; lungs are clear to auscultation bilaterally; no wheezes/rales/rhonchi  CARDIOVASCULAR: Regular rate and rhythm, normal S1 and S2, no murmur/rub/gallop;  ABDOMEN: Nontender to palpation, normoactive bowel sounds, no rebound/guarding; No hepatosplenomegaly  EXTREMITIES: No edema; 2+ peripheral pulses; no clubbing or cyanosis of digits; no joint swelling or tenderness to palpation  NEURO/PSYCH: A+Ox3; affect appropriate; no focal neuro deficits appreciated    LABS:                        8.6    31.10 )-----------( 423      ( 2020 08:33 )             27.2     01-03    127<L>  |  87<L>  |  39<H>  ----------------------------<  256<H>  3.8   |  23  |  5.22<H>    Ca    8.8      2020 06:57  Phos  3.8     -03  Mg     2.1     -03    TPro  7.5  /  Alb  2.8<L>  /  TBili  0.4  /  DBili  0.1  /  AST  24  /  ALT  11  /  AlkPhos  93  01-03    PT/INR - ( 2020 08:30 )   PT: 21.7 sec;   INR: 1.85 ratio         PTT - ( 2020 08:30 )  PTT:34.0 sec      Urinalysis Basic - ( 2020 20:34 )    Color: Light Yellow / Appearance: Slightly Turbid / S.012 / pH: x  Gluc: x / Ketone: Negative  / Bili: Negative / Urobili: Negative   Blood: x / Protein: 300 mg/dL / Nitrite: Negative   Leuk Esterase: Moderate / RBC: 1 /hpf / WBC 46 /HPF   Sq Epi: x / Non Sq Epi: 0 /hpf / Bacteria: Many      Culture - Blood (collected 2020 04:04)  Source: .Blood Blood-Peripheral  Preliminary Report (2020 05:01):    No growth to date.    Culture - Blood (collected 2020 00:54)  Source: .Blood Blood-Peripheral  Preliminary Report (2020 01:01):    No growth to date.      RADIOLOGY & ADDITIONAL TESTS:  Results Reviewed: Yes  Imaging Personally Reviewed: Yes  Electrocardiogram Personally Reviewed: Yes    COORDINATION OF CARE:  Care Discussed with Consultants/Other Providers [Y/N]: Y  Prior or Outpatient Records Reviewed [Y/N]: Y PROGRESS NOTE:   ************************************************  Authored by: Jim Almonte MD PGY1  Internal Medicine  Pager: [facility]: [phone]  *************************************************    Patient is a 64y old  Male who presents with a chief complaint of Seizures (2020 12:46)      SUBJECTIVE / OVERNIGHT EVENTS: Patient retaining urine on bladder scan last night, straight cathed for > 400cc urine. Also complaining of hiccups/nausea, given reglan by night float resident. QTc checked before and after and maintained in reasonable range. The patient was seen and examined at bedside. Appears slightly more alert, but still only somewhat responsive     REVIEW OF SYSTEMS:  As above, otherwise negative.    MEDICATIONS  (STANDING):  albuterol/ipratropium for Nebulization 3 milliLiter(s) Nebulizer every 6 hours  apixaban 5 milliGRAM(s) Oral every 12 hours  atorvastatin 80 milliGRAM(s) Oral at bedtime  calcium acetate 667 milliGRAM(s) Oral three times a day with meals  clopidogrel Tablet 75 milliGRAM(s) Oral daily  dextrose 5%. 1000 milliLiter(s) (50 mL/Hr) IV Continuous <Continuous>  dextrose 50% Injectable 12.5 Gram(s) IV Push once  dextrose 50% Injectable 25 Gram(s) IV Push once  dextrose 50% Injectable 25 Gram(s) IV Push once  insulin lispro (HumaLOG) corrective regimen sliding scale   SubCutaneous every 6 hours  lacosamide Solution 50 milliGRAM(s) Oral two times a day  levETIRAcetam  Solution 250 milliGRAM(s) Oral two times a day  levETIRAcetam  Solution 125 milliGRAM(s) Oral daily  levothyroxine 50 MICROGram(s) Oral daily  melatonin 3 milliGRAM(s) Oral at bedtime  midodrine. 10 milliGRAM(s) Oral every 8 hours  pantoprazole   Suspension 40 milliGRAM(s) Oral two times a day  piperacillin/tazobactam IVPB.. 3.375 Gram(s) IV Intermittent every 12 hours  polyethylene glycol 3350 17 Gram(s) Oral daily  sodium chloride 0.9% Bolus 500 milliLiter(s) IV Bolus once    MEDICATIONS  (PRN):  acetaminophen    Suspension .. 650 milliGRAM(s) Oral every 4 hours PRN Temp greater or equal to 38C (100.4F), Mild Pain (1 - 3), Moderate Pain (4 - 6), Severe Pain (7 - 10)  bisacodyl Suppository 10 milliGRAM(s) Rectal daily PRN Constipation  dextrose 40% Gel 15 Gram(s) Oral once PRN Blood Glucose LESS THAN 70 milliGRAM(s)/deciLiter  glucagon  Injectable 1 milliGRAM(s) IntraMuscular once PRN Glucose <70 milliGRAM(s)/deciLiter  guaiFENesin   Syrup  (Sugar-Free) 200 milliGRAM(s) Oral every 6 hours PRN Cough  senna Syrup 10 milliLiter(s) Oral two times a day PRN Constipation      CAPILLARY BLOOD GLUCOSE      POCT Blood Glucose.: 283 mg/dL (2020 11:46)  POCT Blood Glucose.: 274 mg/dL (2020 06:04)  POCT Blood Glucose.: 237 mg/dL (2020 23:28)  POCT Blood Glucose.: 157 mg/dL (2020 18:15)    I&O's Summary    2020 07:01  -  2020 07:00  --------------------------------------------------------  IN: 1630 mL / OUT: 1570 mL / NET: 60 mL    2020 07:01  -  2020 14:04  --------------------------------------------------------  IN: 429 mL / OUT: 0 mL / NET: 429 mL        PHYSICAL EXAM:  Vital Signs Last 24 Hrs  T(C): 37.9 (2020 12:00), Max: 38.7 (2020 06:36)  T(F): 100.2 (2020 12:00), Max: 101.6 (2020 06:36)  HR: 81 (2020 12:02) (78 - 102)  BP: 94/48 (2020 12:02) (66/48 - 127/61)  BP(mean): 62 (2020 12:02) (55 - 95)  RR: 25 (2020 12:02) (18 - 30)  SpO2: 97% (2020 12:02) (96% - 100%)    CONSTITUTIONAL: NAD, frail appearing  HEENT: atraumatic, normocephalic, dry mucous membranes, no JVD  RESPIRATORY: normal breathing effort; lungs clear to auscultation; no wheezing rales/rhonchi appreciated  CARDIO: regular rate and rhythm, normal S1 and S2; no murmurs/rubs/gallops appreciated  ABDOMEN: soft, nontender, nondistended, normoactive bowel sounds, no hepatosplenomegaly  EXTREMITIES: 2+ peripheral pulses, normal passive range of motion; no clubbing, cyanosis, or edema noted  NEURO/PSYCH: A=Ox3, affect flat follows commands with all but right arm, 0/5 strength in right arm to force, dysarthria noted, no additional focal deficits appreciated    LABS:                        8.6    31.10 )-----------( 423      ( 2020 08:33 )             27.2     01-03    127<L>  |  87<L>  |  39<H>  ----------------------------<  256<H>  3.8   |  23  |  5.22<H>    Ca    8.8      2020 06:57  Phos  3.8     01-03  Mg     2.1     01-03    TPro  7.5  /  Alb  2.8<L>  /  TBili  0.4  /  DBili  0.1  /  AST  24  /  ALT  11  /  AlkPhos  93  01-03    PT/INR - ( 2020 08:30 )   PT: 21.7 sec;   INR: 1.85 ratio         PTT - ( 2020 08:30 )  PTT:34.0 sec      Urinalysis Basic - ( 2020 20:34 )    Color: Light Yellow / Appearance: Slightly Turbid / S.012 / pH: x  Gluc: x / Ketone: Negative  / Bili: Negative / Urobili: Negative   Blood: x / Protein: 300 mg/dL / Nitrite: Negative   Leuk Esterase: Moderate / RBC: 1 /hpf / WBC 46 /HPF   Sq Epi: x / Non Sq Epi: 0 /hpf / Bacteria: Many      Culture - Blood (collected 2020 04:04)  Source: .Blood Blood-Peripheral  Preliminary Report (2020 05:01):    No growth to date.    Culture - Blood (collected 2020 00:54)  Source: .Blood Blood-Peripheral  Preliminary Report (2020 01:01):    No growth to date.      RADIOLOGY & ADDITIONAL TESTS:  Results Reviewed: Yes  Imaging Personally Reviewed: Yes  Electrocardiogram Personally Reviewed: Yes    COORDINATION OF CARE:  Care Discussed with Consultants/Other Providers [Y/N]: Y  Prior or Outpatient Records Reviewed [Y/N]: Y

## 2020-01-03 NOTE — CHART NOTE - NSCHARTNOTEFT_GEN_A_CORE
Nutrition Follow Up Note  Patient seen for: f/u    Chart reviewed, events noted. Adm dx CVA, ESRD. S/P PEG . Last HD .     Source: chart, team    Diet :  Nepro 50cc/hr x 24 hrs via PEG     Enteral /Parenteral Nutrition: goal 120cc/day  24% goal met over past 5 days  pt had episode of emesis 1/2 EN held, had CT no evidence of ileus or SBO. Noted EN had been held previous days for episodes of coughing    GI: no vomiting, had BM today (on bowel regimen)      Daily Weight in k.4 (), Weight in k.6 (), Weight in k.9 (-), Weight in k.5 (-30), Weight in k (-30), Weight in k.5 (-28), Weight in k (-28)  dosing wt  66.6 kg    ?wt discrepancies since adm   no edema noted    Pertinent Medications: MEDICATIONS  (STANDING):  albuterol/ipratropium for Nebulization 3 milliLiter(s) Nebulizer every 6 hours  apixaban 5 milliGRAM(s) Oral every 12 hours  atorvastatin 80 milliGRAM(s) Oral at bedtime  calcium acetate 667 milliGRAM(s) Oral three times a day with meals  clopidogrel Tablet 75 milliGRAM(s) Oral daily  dextrose 5%. 1000 milliLiter(s) (50 mL/Hr) IV Continuous <Continuous>  dextrose 50% Injectable 12.5 Gram(s) IV Push once  dextrose 50% Injectable 25 Gram(s) IV Push once  dextrose 50% Injectable 25 Gram(s) IV Push once  insulin lispro (HumaLOG) corrective regimen sliding scale   SubCutaneous every 6 hours  lacosamide Solution 50 milliGRAM(s) Oral two times a day  levETIRAcetam  Solution 250 milliGRAM(s) Oral two times a day  levETIRAcetam  Solution 125 milliGRAM(s) Oral daily  levothyroxine 50 MICROGram(s) Oral daily  melatonin 3 milliGRAM(s) Oral at bedtime  midodrine. 10 milliGRAM(s) Oral every 8 hours  pantoprazole   Suspension 40 milliGRAM(s) Oral two times a day  piperacillin/tazobactam IVPB.. 3.375 Gram(s) IV Intermittent every 12 hours  polyethylene glycol 3350 17 Gram(s) Oral daily  sodium chloride 0.9% Bolus 500 milliLiter(s) IV Bolus once    MEDICATIONS  (PRN):  acetaminophen    Suspension .. 650 milliGRAM(s) Oral every 4 hours PRN Temp greater or equal to 38C (100.4F), Mild Pain (1 - 3), Moderate Pain (4 - 6), Severe Pain (7 - 10)  bisacodyl Suppository 10 milliGRAM(s) Rectal daily PRN Constipation  dextrose 40% Gel 15 Gram(s) Oral once PRN Blood Glucose LESS THAN 70 milliGRAM(s)/deciLiter  glucagon  Injectable 1 milliGRAM(s) IntraMuscular once PRN Glucose <70 milliGRAM(s)/deciLiter  guaiFENesin   Syrup  (Sugar-Free) 200 milliGRAM(s) Oral every 6 hours PRN Cough  senna Syrup 10 milliLiter(s) Oral two times a day PRN Constipation      Finger Sticks:  POCT Blood Glucose.: 274 mg/dL ( @ 06:04)  POCT Blood Glucose.: 237 mg/dL ( @ 23:28)  POCT Blood Glucose.: 157 mg/dL ( @ 18:15)  POCT Blood Glucose.: 91 mg/dL ( @ 13:41)      Skin per nursing documentation: no pressure injuries documented       Estimated Needs: based on dosing wt  66.6 kg 2936-2846 kcals (25-30 kcals/kg), 80-93 gm protein (1.2-1.4 gm/kg)      Previous Nutrition Diagnosis: increased protein needs  Nutrition Diagnosis is: ongoing, addressed w/ EN    New Nutrition Diagnosis: none  Related to:    As evidenced by:      Interventions:     Recommend  1) recommend Nepro goal rate 50 ml/hr x 24 hours to provide 1200ml formula, 2160 orestes, 97 Gm protein, 872ml free water (provides  32 orestes/Kg and  1.4 Gm protein/Kg based on dosing wt 66.6Kg  2) reassess BG control for BG > 180    Monitoring and Evaluation:     Continue to monitor Nutritional intake, Tolerance to diet prescription, weights, labs, skin integrity    RD remains available upon request and will follow up per protocol

## 2020-01-03 NOTE — PROGRESS NOTE ADULT - SUBJECTIVE AND OBJECTIVE BOX
INTERVAL HPI/OVERNIGHT EVENTS:    events noted  febrile to 101.6F overnight with leukocytosis; hypotensive    CT abd/pelvis demonstrates no presence of SBO or ileus  pt seen and examined at bedside with family present  lethargic, but appropriately responding  tolerating tube feeds @ 50cc/hr  +BMs x 3 this AM   H/H drop noted; no s/s of overt GIB  patient is without n/v abd pain     MEDICATIONS  (STANDING):  albuterol/ipratropium for Nebulization 3 milliLiter(s) Nebulizer every 6 hours  apixaban 5 milliGRAM(s) Oral every 12 hours  atorvastatin 80 milliGRAM(s) Oral at bedtime  calcium acetate 667 milliGRAM(s) Oral three times a day with meals  carvedilol 3.125 milliGRAM(s) Oral every 12 hours  clopidogrel Tablet 75 milliGRAM(s) Oral daily  dextrose 5%. 1000 milliLiter(s) (50 mL/Hr) IV Continuous <Continuous>  dextrose 50% Injectable 12.5 Gram(s) IV Push once  dextrose 50% Injectable 25 Gram(s) IV Push once  dextrose 50% Injectable 25 Gram(s) IV Push once  insulin lispro (HumaLOG) corrective regimen sliding scale   SubCutaneous every 6 hours  insulin NPH human recombinant 18 Unit(s) SubCutaneous every 6 hours  lacosamide Solution 50 milliGRAM(s) Oral two times a day  levETIRAcetam  Solution 250 milliGRAM(s) Oral two times a day  levETIRAcetam  Solution 125 milliGRAM(s) Oral daily  levothyroxine 50 MICROGram(s) Oral daily  melatonin 3 milliGRAM(s) Oral at bedtime  midodrine. 10 milliGRAM(s) Oral every 8 hours  pantoprazole   Suspension 40 milliGRAM(s) Oral two times a day  piperacillin/tazobactam IVPB.. 3.375 Gram(s) IV Intermittent every 12 hours  polyethylene glycol 3350 17 Gram(s) Oral daily  sodium chloride 0.9% Bolus 500 milliLiter(s) IV Bolus once    MEDICATIONS  (PRN):  acetaminophen    Suspension .. 650 milliGRAM(s) Oral every 4 hours PRN Temp greater or equal to 38C (100.4F), Mild Pain (1 - 3), Moderate Pain (4 - 6), Severe Pain (7 - 10)  dextrose 40% Gel 15 Gram(s) Oral once PRN Blood Glucose LESS THAN 70 milliGRAM(s)/deciLiter  glucagon  Injectable 1 milliGRAM(s) IntraMuscular once PRN Glucose <70 milliGRAM(s)/deciLiter  guaiFENesin   Syrup  (Sugar-Free) 200 milliGRAM(s) Oral every 6 hours PRN Cough  senna Syrup 10 milliLiter(s) Oral two times a day PRN Constipation      Allergies    No Known Allergies    Intolerances        Review of Systems:    General:  No wt loss, fevers, chills, night sweats,fatigue,   Eyes:  Good vision, no reported pain  ENT:  No sore throat, pain, runny nose, dysphagia  CV:  No pain, palpitations, hypo/hypertension  Resp:  No dyspnea, cough, tachypnea, wheezing  GI:  No pain, No nausea, No vomiting, No diarrhea, No constipation, No weight loss, No fever, No pruritis, No rectal bleeding, No melena, No dysphagia  :  No pain, bleeding, incontinence, nocturia  Muscle:  No pain, weakness  Neuro:  No weakness, tingling, memory problems  Psych:  No fatigue, insomnia, mood problems, depression  Endocrine:  No polyuria, polydypsia, cold/heat intolerance  Heme:  No petechiae, ecchymosis, easy bruisability  Skin:  No rash, tattoos, scars, edema      Vital Signs Last 24 Hrs  T(C): 36.7 (02 Jan 2020 12:35), Max: 38.2 (01 Jan 2020 20:00)  T(F): 98.1 (02 Jan 2020 12:35), Max: 100.7 (01 Jan 2020 20:00)  HR: 98 (02 Jan 2020 12:35) (69 - 105)  BP: 119/50 (02 Jan 2020 12:35) (77/42 - 193/95)  BP(mean): 67 (02 Jan 2020 08:17) (54 - 121)  RR: 18 (02 Jan 2020 12:35) (11 - 35)  SpO2: 96% (02 Jan 2020 12:35) (91% - 100%)    PHYSICAL EXAM:    ill appearing  frail  non toxic  soft, nt +peg   no edema    LABS:                        9.9    31.43 )-----------( 495      ( 02 Jan 2020 05:56 )             30.6     01-02    131<L>  |  91<L>  |  68<H>  ----------------------------<  96  4.4   |  21<L>  |  7.84<H>    Ca    8.9      02 Jan 2020 05:56            RADIOLOGY & ADDITIONAL TESTS:

## 2020-01-03 NOTE — PROGRESS NOTE ADULT - PROBLEM SELECTOR PLAN 8
nph 18u q6  corrective scale insulin before meals  regular FSs  PEG feeds nph 18u q6  corrective scale insulin Q6H  regular FSs  PEG feeds

## 2020-01-03 NOTE — PROGRESS NOTE ADULT - SUBJECTIVE AND OBJECTIVE BOX
[facility]   MD NINA MASON MD RUORU WONG, PA    TEL:  OFFICE: [phone]  DR HSU CELL: [phone]  RAS MORELOS CELL: [phone]  DR. MARQUEZ CELL: [phone]  DR. ALEX CELL: [phone]    FROM 5 PM - 7 AM PLEASE CALL ANSWERING SERVICE: [phone]    RENAL FOLLOW UP NOTE  --------------------------------------------------------------------------------  HPI:      Pt seen and examined at bedside.   + fever    PAST HISTORY  --------------------------------------------------------------------------------  No significant changes to PMH, PSH, FHx, SHx, unless otherwise noted    ALLERGIES & MEDICATIONS  --------------------------------------------------------------------------------  Allergies    No Known Allergies    Intolerances      Standing Inpatient Medications  albuterol/ipratropium for Nebulization 3 milliLiter(s) Nebulizer every 6 hours  apixaban 5 milliGRAM(s) Oral every 12 hours  atorvastatin 80 milliGRAM(s) Oral at bedtime  calcium acetate 667 milliGRAM(s) Oral three times a day with meals  clopidogrel Tablet 75 milliGRAM(s) Oral daily  dextrose 5%. 1000 milliLiter(s) IV Continuous <Continuous>  dextrose 50% Injectable 12.5 Gram(s) IV Push once  dextrose 50% Injectable 25 Gram(s) IV Push once  dextrose 50% Injectable 25 Gram(s) IV Push once  insulin lispro (HumaLOG) corrective regimen sliding scale   SubCutaneous every 6 hours  lacosamide Solution 50 milliGRAM(s) Oral two times a day  levETIRAcetam  Solution 250 milliGRAM(s) Oral two times a day  levETIRAcetam  Solution 125 milliGRAM(s) Oral daily  levothyroxine 50 MICROGram(s) Oral daily  melatonin 3 milliGRAM(s) Oral at bedtime  midodrine. 10 milliGRAM(s) Oral every 8 hours  pantoprazole   Suspension 40 milliGRAM(s) Oral two times a day  piperacillin/tazobactam IVPB.. 3.375 Gram(s) IV Intermittent every 12 hours  polyethylene glycol 3350 17 Gram(s) Oral daily  sodium chloride 0.9% Bolus 500 milliLiter(s) IV Bolus once    PRN Inpatient Medications  acetaminophen    Suspension .. 650 milliGRAM(s) Oral every 4 hours PRN  bisacodyl Suppository 10 milliGRAM(s) Rectal daily PRN  dextrose 40% Gel 15 Gram(s) Oral once PRN  glucagon  Injectable 1 milliGRAM(s) IntraMuscular once PRN  guaiFENesin   Syrup  (Sugar-Free) 200 milliGRAM(s) Oral every 6 hours PRN  senna Syrup 10 milliLiter(s) Oral two times a day PRN      REVIEW OF SYSTEMS  --------------------------------------------------------------------------------  General:  fever+    MSK: no edema     VITALS/PHYSICAL EXAM  --------------------------------------------------------------------------------  T(C): 37.9 (01-03-20 @ 12:00), Max: 39 (01-02-20 @ 13:30)  HR: 82 (01-03-20 @ 12:00) (78 - 102)  BP: 66/48 (01-03-20 @ 12:00) (66/48 - 127/61)  RR: 25 (01-03-20 @ 12:00) (18 - 30)  SpO2: 100% (01-03-20 @ 12:00) (96% - 100%)  Wt(kg): --        01-02-20 @ 07:01  -  01-03-20 @ 07:00  --------------------------------------------------------  IN: 1630 mL / OUT: 1570 mL / NET: 60 mL    01-03-20 @ 07:01  -  01-03-20 @ 12:46  --------------------------------------------------------  IN: 429 mL / OUT: 0 mL / NET: 429 mL      Physical Exam:  	Gen: NAD  	HEENT: MMM  	Pulm: CTA B/L  	CV: S1S2  	Abd: Soft, +BS  	Ext: No LE edema B/L                      Neuro: Awake   	Skin: Warm and Dry   	Vascular access: tunnelled HD catheter    LABS/STUDIES  --------------------------------------------------------------------------------              8.6    31.10 >-----------<  423      [01-03-20 @ 08:33]              27.2     127  |  87  |  39  ----------------------------<  256      [01-03-20 @ 06:57]  3.8   |  23  |  5.22        Ca     8.8     [01-03-20 @ 06:57]      Mg     2.1     [01-03-20 @ 06:57]      Phos  3.8     [01-03-20 @ 06:57]    TPro  7.5  /  Alb  2.8  /  TBili  0.4  /  DBili  x   /  AST  24  /  ALT  11  /  AlkPhos  93  [01-03-20 @ 06:57]    PT/INR: PT 21.7 , INR 1.85       [01-03-20 @ 08:30]  PTT: 34.0       [01-03-20 @ 08:30]      Creatinine Trend:  SCr 5.22 [01-03 @ 06:57]  SCr 7.84 [01-02 @ 05:56]  SCr 7.05 [01-01 @ 20:58]  SCr 6.28 [01-01 @ 06:00]  SCr 4.29 [12-31 @ 04:58]    Urinalysis - [01-02-20 @ 20:34]      Color Light Yellow / Appearance Slightly Turbid / SG 1.012 / pH 7.0      Gluc 100 mg/dL / Ketone Negative  / Bili Negative / Urobili Negative       Blood Small / Protein 300 mg/dL / Leuk Est Moderate / Nitrite Negative      RBC 1 / WBC 46 / Hyaline 1 / Gran  / Sq Epi  / Non Sq Epi 0 / Bacteria Many      Iron 45, TIBC 145, %sat 31      [12-26-19 @ 23:24]  Ferritin 1369      [12-26-19 @ 23:24]  PTH -- (Ca 6.9)      [06-05-19 @ 08:14]   562  HbA1c 8.4      [12-15-19 @ 11:00]  TSH 6.22      [01-03-20 @ 10:03]  Lipid: chol 134, , HDL 56, LDL 52      [12-15-19 @ 10:56]    HBsAb >1000.0      [12-14-19 @ 23:48]  HBsAg Nonreact      [12-14-19 @ 23:48]  HCV 0.15, Nonreact      [12-14-19 @ 23:48]

## 2020-01-03 NOTE — PROGRESS NOTE ADULT - PROBLEM SELECTOR PLAN 1
with fever of yet unclear etiology   -WBC unimproved today despite initiation of Vanco/Zosyn on 1/1  -Blood cultures thus far negative, RVP negative  -UA positive   -CT C/A/P without evidence of occult infection   -patient recently had nasal trumpet d/c on 12/31, can consider sinus imaging to r/o sinusitis if no other clear source of infection.   -Further mgmt per ID

## 2020-01-03 NOTE — PROGRESS NOTE ADULT - PROBLEM SELECTOR PLAN 9
DVT ppx: Patient on Eliquis  Diet: NPO, has tube feeds to PEG tube  Dispo: Uncertain at this time, likely rehab

## 2020-01-03 NOTE — PROGRESS NOTE ADULT - ASSESSMENT
65 yo male with ESRD on HD (M-W-FRI without AVF as awaiting renal transplant expected within 3 months; Dr. Barrientos; [facility]), CAD s/p stent 2009 and CARLA in June 2019, HTN, DM type 2, hypothyroidism and left carotid stenosis admitted for altered mental status during dialysis, he became unresponsive and arm not moving therefore he was sent to hospital. At [facility], he received tPA on 12/13/19. Was noted to have SAH after tPA. Started on 3% saline. Had seizure, despite multiple AEDs remained in status epilepticus and was transferred to [facility] for continuous EEG monitoring. s/p extubation, PEG tube placement. Is awake and following commands. Nephrology is following for ESRD management.    ESRD on HD MWF via tunnelled HD catheter   - HD consent in the chart  - Last HD 1/2/2020 with 1 L UF   - please dose Keppra post HD on HD days    -s/p CT with contrast, plan for HD today with no UF     - needs rehab w/ onsite HD    Hyponatremia  - from increased free water intake  - Use minimum possible free water flushes  - should improve w/ HD    Anemia  - unable to use epogen due to CVA  - transfuse PRN Hgb <7 or hemodynamically significant bleeding  - no evidence of occult bleeding  - GI following     HTN  -Hypotensive today --   -no UF with HD  - Monitor closely    Fever   -etiology?  -s/p CT ABd   - PT also with permacath -- follow up ID

## 2020-01-03 NOTE — PROGRESS NOTE ADULT - ASSESSMENT
63 yo male with ESRD, HD, PAF, admitted with multiple CVA's.  Difficulty with pulmonary toilet and airway secretions, but clear CXR and CT scan.  S/P PEG  Respiratory status, remains alert  Sptm with Enterobacter and MSSA- viewed as colonizers, but now febrile, dramatic rise in WBC  Vanco x 1  Zosyn started  Even with repeat CXR clear, still most concerned abt aspiration   Line bacteremia possible as well  Repeat CT of C/A/P on 1/2 without any site of infection.  IV sites are okay  Febrile without clear source of infection  Suggest:  Continue Zosyn, should address latest sputum isolates  Await Bld Cxs results  hold additional vanco  Follow temps and CBC/diff   No clear explanation for dramatic rise in wbc count.He appears hemodynamically stable. 65 yo male with ESRD, HD, PAF, admitted with multiple CVA's.  Difficulty with pulmonary toilet and airway secretions, but clear CXR and CT scan.  S/P PEG  Respiratory status, remains alert  Sptm with Enterobacter and MSSA- viewed as colonizers, but now febrile, dramatic rise in WBC  Vanco x 1  Zosyn started  Even with repeat CXR clear, still most concerned abt aspiration   Line bacteremia possible as well  Repeat CT of C/A/P on 1/2 without any site of infection.  IV sites are okay  Febrile without clear source of infection  Drop in HCT, ? Occult hematoma  Suggest:  Continue Zosyn, should address latest sputum isolates  Await Bld Cxs results  hold additional vanco  Follow temps and CBC/diff   No clear explanation for dramatic rise in wbc count.He appears hemodynamically stable.  Recent CT imaging unremarkable,  will simply follow on zosyn

## 2020-01-04 DIAGNOSIS — G93.40 ENCEPHALOPATHY, UNSPECIFIED: ICD-10-CM

## 2020-01-04 DIAGNOSIS — R79.89 OTHER SPECIFIED ABNORMAL FINDINGS OF BLOOD CHEMISTRY: ICD-10-CM

## 2020-01-04 DIAGNOSIS — J81.1 CHRONIC PULMONARY EDEMA: ICD-10-CM

## 2020-01-04 LAB
ANION GAP SERPL CALC-SCNC: 15 MMOL/L — SIGNIFICANT CHANGE UP (ref 5–17)
ANION GAP SERPL CALC-SCNC: 17 MMOL/L — SIGNIFICANT CHANGE UP (ref 5–17)
ANION GAP SERPL CALC-SCNC: 19 MMOL/L — HIGH (ref 5–17)
ANION GAP SERPL CALC-SCNC: 22 MMOL/L — HIGH (ref 5–17)
BUN SERPL-MCNC: 25 MG/DL — HIGH (ref 7–23)
BUN SERPL-MCNC: 33 MG/DL — HIGH (ref 7–23)
BUN SERPL-MCNC: 48 MG/DL — HIGH (ref 7–23)
BUN SERPL-MCNC: 48 MG/DL — HIGH (ref 7–23)
CALCIUM SERPL-MCNC: 8.7 MG/DL — SIGNIFICANT CHANGE UP (ref 8.4–10.5)
CALCIUM SERPL-MCNC: 8.9 MG/DL — SIGNIFICANT CHANGE UP (ref 8.4–10.5)
CALCIUM SERPL-MCNC: 8.9 MG/DL — SIGNIFICANT CHANGE UP (ref 8.4–10.5)
CALCIUM SERPL-MCNC: 9.4 MG/DL — SIGNIFICANT CHANGE UP (ref 8.4–10.5)
CHLORIDE SERPL-SCNC: 88 MMOL/L — LOW (ref 96–108)
CHLORIDE SERPL-SCNC: 88 MMOL/L — LOW (ref 96–108)
CHLORIDE SERPL-SCNC: 95 MMOL/L — LOW (ref 96–108)
CHLORIDE SERPL-SCNC: 97 MMOL/L — SIGNIFICANT CHANGE UP (ref 96–108)
CO2 SERPL-SCNC: 17 MMOL/L — LOW (ref 22–31)
CO2 SERPL-SCNC: 19 MMOL/L — LOW (ref 22–31)
CO2 SERPL-SCNC: 22 MMOL/L — SIGNIFICANT CHANGE UP (ref 22–31)
CO2 SERPL-SCNC: 22 MMOL/L — SIGNIFICANT CHANGE UP (ref 22–31)
CREAT SERPL-MCNC: 3.71 MG/DL — HIGH (ref 0.5–1.3)
CREAT SERPL-MCNC: 4.41 MG/DL — HIGH (ref 0.5–1.3)
CREAT SERPL-MCNC: 6.2 MG/DL — HIGH (ref 0.5–1.3)
CREAT SERPL-MCNC: 6.62 MG/DL — HIGH (ref 0.5–1.3)
GLUCOSE SERPL-MCNC: 135 MG/DL — HIGH (ref 70–99)
GLUCOSE SERPL-MCNC: 136 MG/DL — HIGH (ref 70–99)
GLUCOSE SERPL-MCNC: 162 MG/DL — HIGH (ref 70–99)
GLUCOSE SERPL-MCNC: 211 MG/DL — HIGH (ref 70–99)
GRAM STN FLD: SIGNIFICANT CHANGE UP
HCT VFR BLD CALC: 28.1 % — LOW (ref 39–50)
HGB BLD-MCNC: 8.9 G/DL — LOW (ref 13–17)
MAGNESIUM SERPL-MCNC: 2.2 MG/DL — SIGNIFICANT CHANGE UP (ref 1.6–2.6)
MAGNESIUM SERPL-MCNC: 2.2 MG/DL — SIGNIFICANT CHANGE UP (ref 1.6–2.6)
MAGNESIUM SERPL-MCNC: 2.4 MG/DL — SIGNIFICANT CHANGE UP (ref 1.6–2.6)
MCHC RBC-ENTMCNC: 29.5 PG — SIGNIFICANT CHANGE UP (ref 27–34)
MCHC RBC-ENTMCNC: 31.7 GM/DL — LOW (ref 32–36)
MCV RBC AUTO: 93 FL — SIGNIFICANT CHANGE UP (ref 80–100)
NRBC # BLD: 0 /100 WBCS — SIGNIFICANT CHANGE UP (ref 0–0)
PHOSPHATE SERPL-MCNC: 2.8 MG/DL — SIGNIFICANT CHANGE UP (ref 2.5–4.5)
PHOSPHATE SERPL-MCNC: 4.4 MG/DL — SIGNIFICANT CHANGE UP (ref 2.5–4.5)
PHOSPHATE SERPL-MCNC: 4.7 MG/DL — HIGH (ref 2.5–4.5)
PLATELET # BLD AUTO: 377 K/UL — SIGNIFICANT CHANGE UP (ref 150–400)
POTASSIUM SERPL-MCNC: 3.9 MMOL/L — SIGNIFICANT CHANGE UP (ref 3.5–5.3)
POTASSIUM SERPL-MCNC: 4.1 MMOL/L — SIGNIFICANT CHANGE UP (ref 3.5–5.3)
POTASSIUM SERPL-MCNC: 4.1 MMOL/L — SIGNIFICANT CHANGE UP (ref 3.5–5.3)
POTASSIUM SERPL-MCNC: 5.5 MMOL/L — HIGH (ref 3.5–5.3)
POTASSIUM SERPL-SCNC: 3.9 MMOL/L — SIGNIFICANT CHANGE UP (ref 3.5–5.3)
POTASSIUM SERPL-SCNC: 4.1 MMOL/L — SIGNIFICANT CHANGE UP (ref 3.5–5.3)
POTASSIUM SERPL-SCNC: 4.1 MMOL/L — SIGNIFICANT CHANGE UP (ref 3.5–5.3)
POTASSIUM SERPL-SCNC: 5.5 MMOL/L — HIGH (ref 3.5–5.3)
RBC # BLD: 3.02 M/UL — LOW (ref 4.2–5.8)
RBC # FLD: 15.6 % — HIGH (ref 10.3–14.5)
SODIUM SERPL-SCNC: 126 MMOL/L — LOW (ref 135–145)
SODIUM SERPL-SCNC: 127 MMOL/L — LOW (ref 135–145)
SODIUM SERPL-SCNC: 132 MMOL/L — LOW (ref 135–145)
SODIUM SERPL-SCNC: 136 MMOL/L — SIGNIFICANT CHANGE UP (ref 135–145)
SPECIMEN SOURCE: SIGNIFICANT CHANGE UP
WBC # BLD: 20.01 K/UL — HIGH (ref 3.8–10.5)
WBC # FLD AUTO: 20.01 K/UL — HIGH (ref 3.8–10.5)

## 2020-01-04 PROCEDURE — 70450 CT HEAD/BRAIN W/O DYE: CPT | Mod: 26

## 2020-01-04 PROCEDURE — 95720 EEG PHY/QHP EA INCR W/VEEG: CPT

## 2020-01-04 PROCEDURE — 71045 X-RAY EXAM CHEST 1 VIEW: CPT | Mod: 26

## 2020-01-04 PROCEDURE — 99291 CRITICAL CARE FIRST HOUR: CPT

## 2020-01-04 RX ORDER — GUAIFENESIN/DEXTROMETHORPHAN 600MG-30MG
5 TABLET, EXTENDED RELEASE 12 HR ORAL EVERY 6 HOURS
Refills: 0 | Status: DISCONTINUED | OUTPATIENT
Start: 2020-01-04 | End: 2020-01-05

## 2020-01-04 RX ORDER — SODIUM CHLORIDE 5 G/100ML
1000 INJECTION, SOLUTION INTRAVENOUS
Refills: 0 | Status: DISCONTINUED | OUTPATIENT
Start: 2020-01-04 | End: 2020-01-05

## 2020-01-04 RX ORDER — LACTOBACILLUS ACIDOPHILUS 100MM CELL
1 CAPSULE ORAL DAILY
Refills: 0 | Status: DISCONTINUED | OUTPATIENT
Start: 2020-01-04 | End: 2020-01-14

## 2020-01-04 RX ORDER — BUDESONIDE AND FORMOTEROL FUMARATE DIHYDRATE 160; 4.5 UG/1; UG/1
2 AEROSOL RESPIRATORY (INHALATION)
Refills: 0 | Status: DISCONTINUED | OUTPATIENT
Start: 2020-01-04 | End: 2020-01-07

## 2020-01-04 RX ORDER — HUMAN INSULIN 100 [IU]/ML
10 INJECTION, SUSPENSION SUBCUTANEOUS EVERY 6 HOURS
Refills: 0 | Status: DISCONTINUED | OUTPATIENT
Start: 2020-01-04 | End: 2020-01-08

## 2020-01-04 RX ORDER — APIXABAN 2.5 MG/1
5 TABLET, FILM COATED ORAL
Refills: 0 | Status: DISCONTINUED | OUTPATIENT
Start: 2020-01-04 | End: 2020-01-14

## 2020-01-04 RX ORDER — ERYTHROPOIETIN 10000 [IU]/ML
10000 INJECTION, SOLUTION INTRAVENOUS; SUBCUTANEOUS
Refills: 0 | Status: DISCONTINUED | OUTPATIENT
Start: 2020-01-04 | End: 2020-01-07

## 2020-01-04 RX ORDER — GUAIFENESIN/DEXTROMETHORPHAN 600MG-30MG
100 TABLET, EXTENDED RELEASE 12 HR ORAL EVERY 6 HOURS
Refills: 0 | Status: DISCONTINUED | OUTPATIENT
Start: 2020-01-04 | End: 2020-01-04

## 2020-01-04 RX ORDER — MEROPENEM 1 G/30ML
500 INJECTION INTRAVENOUS EVERY 24 HOURS
Refills: 0 | Status: DISCONTINUED | OUTPATIENT
Start: 2020-01-04 | End: 2020-01-09

## 2020-01-04 RX ORDER — CLOPIDOGREL BISULFATE 75 MG/1
75 TABLET, FILM COATED ORAL DAILY
Refills: 0 | Status: DISCONTINUED | OUTPATIENT
Start: 2020-01-04 | End: 2020-01-14

## 2020-01-04 RX ADMIN — MIDODRINE HYDROCHLORIDE 10 MILLIGRAM(S): 2.5 TABLET ORAL at 11:45

## 2020-01-04 RX ADMIN — Medication 200 MILLIGRAM(S): at 17:30

## 2020-01-04 RX ADMIN — LEVETIRACETAM 250 MILLIGRAM(S): 250 TABLET, FILM COATED ORAL at 05:15

## 2020-01-04 RX ADMIN — Medication 650 MILLIGRAM(S): at 02:00

## 2020-01-04 RX ADMIN — Medication 50 MICROGRAM(S): at 05:15

## 2020-01-04 RX ADMIN — Medication 3 MILLILITER(S): at 17:01

## 2020-01-04 RX ADMIN — SODIUM CHLORIDE 50 MILLILITER(S): 9 INJECTION INTRAMUSCULAR; INTRAVENOUS; SUBCUTANEOUS at 05:25

## 2020-01-04 RX ADMIN — LACOSAMIDE 50 MILLIGRAM(S): 50 TABLET ORAL at 17:31

## 2020-01-04 RX ADMIN — Medication 100 MILLIGRAM(S): at 19:59

## 2020-01-04 RX ADMIN — Medication 1 TABLET(S): at 11:27

## 2020-01-04 RX ADMIN — PANTOPRAZOLE SODIUM 40 MILLIGRAM(S): 20 TABLET, DELAYED RELEASE ORAL at 17:31

## 2020-01-04 RX ADMIN — Medication 2: at 00:48

## 2020-01-04 RX ADMIN — Medication 4: at 13:16

## 2020-01-04 RX ADMIN — Medication 667 MILLIGRAM(S): at 05:14

## 2020-01-04 RX ADMIN — Medication 667 MILLIGRAM(S): at 14:48

## 2020-01-04 RX ADMIN — Medication 650 MILLIGRAM(S): at 01:30

## 2020-01-04 RX ADMIN — Medication 3 MILLILITER(S): at 05:55

## 2020-01-04 RX ADMIN — ERYTHROPOIETIN 10000 UNIT(S): 10000 INJECTION, SOLUTION INTRAVENOUS; SUBCUTANEOUS at 14:15

## 2020-01-04 RX ADMIN — LEVETIRACETAM 125 MILLIGRAM(S): 250 TABLET, FILM COATED ORAL at 11:27

## 2020-01-04 RX ADMIN — Medication 667 MILLIGRAM(S): at 21:37

## 2020-01-04 RX ADMIN — Medication 10 MILLIGRAM(S): at 01:00

## 2020-01-04 RX ADMIN — PANTOPRAZOLE SODIUM 40 MILLIGRAM(S): 20 TABLET, DELAYED RELEASE ORAL at 05:20

## 2020-01-04 RX ADMIN — Medication 2: at 23:47

## 2020-01-04 RX ADMIN — CLOPIDOGREL BISULFATE 75 MILLIGRAM(S): 75 TABLET, FILM COATED ORAL at 11:27

## 2020-01-04 RX ADMIN — APIXABAN 5 MILLIGRAM(S): 2.5 TABLET, FILM COATED ORAL at 17:32

## 2020-01-04 RX ADMIN — APIXABAN 5 MILLIGRAM(S): 2.5 TABLET, FILM COATED ORAL at 10:47

## 2020-01-04 RX ADMIN — Medication 10 MILLIGRAM(S): at 08:01

## 2020-01-04 RX ADMIN — Medication 3 MILLILITER(S): at 22:54

## 2020-01-04 RX ADMIN — Medication 10 MILLIGRAM(S): at 17:43

## 2020-01-04 RX ADMIN — MIDODRINE HYDROCHLORIDE 10 MILLIGRAM(S): 2.5 TABLET ORAL at 21:37

## 2020-01-04 RX ADMIN — MEROPENEM 100 MILLIGRAM(S): 1 INJECTION INTRAVENOUS at 18:44

## 2020-01-04 RX ADMIN — HUMAN INSULIN 10 UNIT(S): 100 INJECTION, SUSPENSION SUBCUTANEOUS at 23:46

## 2020-01-04 RX ADMIN — CHLORHEXIDINE GLUCONATE 1 APPLICATION(S): 213 SOLUTION TOPICAL at 21:37

## 2020-01-04 RX ADMIN — HUMAN INSULIN 10 UNIT(S): 100 INJECTION, SUSPENSION SUBCUTANEOUS at 14:33

## 2020-01-04 RX ADMIN — Medication 2: at 05:24

## 2020-01-04 RX ADMIN — SODIUM CHLORIDE 50 MILLILITER(S): 5 INJECTION, SOLUTION INTRAVENOUS at 10:47

## 2020-01-04 RX ADMIN — ATORVASTATIN CALCIUM 80 MILLIGRAM(S): 80 TABLET, FILM COATED ORAL at 21:37

## 2020-01-04 RX ADMIN — MIDODRINE HYDROCHLORIDE 10 MILLIGRAM(S): 2.5 TABLET ORAL at 05:15

## 2020-01-04 RX ADMIN — Medication 3 MILLILITER(S): at 12:36

## 2020-01-04 RX ADMIN — LEVETIRACETAM 250 MILLIGRAM(S): 250 TABLET, FILM COATED ORAL at 17:32

## 2020-01-04 RX ADMIN — LACOSAMIDE 50 MILLIGRAM(S): 50 TABLET ORAL at 05:15

## 2020-01-04 RX ADMIN — Medication 5 MILLILITER(S): at 23:45

## 2020-01-04 RX ADMIN — HUMAN INSULIN 10 UNIT(S): 100 INJECTION, SUSPENSION SUBCUTANEOUS at 17:43

## 2020-01-04 NOTE — PROGRESS NOTE ADULT - PROBLEM SELECTOR PLAN 4
Worsening anemia today  -Consider repeat FOB  -FOB negative  -CT A/P negative for RP hematoma with NGT  -S/p PEG placement 12/27

## 2020-01-04 NOTE — PROGRESS NOTE ADULT - SUBJECTIVE AND OBJECTIVE BOX
HPI:  Patient is a 64 year old male with a history of CAD s/p stents 2009 and 2019, HTN, DM, Carotid stenosis, Hypothyroidism, and ESRD on HD who presented to outside hospital with altered mental status as he was unresponsive, aphasic, and not moving any extremities.  Was treated with tPA and admitted for observation.  Patient noted to have seizure during hospital course and was started on Keppra.  MRI brain was done which showed bilateral supratentorial and infratentorial infarcts.  Patient was undergoing dialysis when he developed A. fib and was intubated for airway protection.  Neurology following and multiple EEGs were done which showed patient in status epilepticus despite multiple antiepilectics.  Patient transferred to [facility] for further management. (19 Dec 2019 16:37)  Was initially in NSCU with status epilepticus. Improved. [facility]   Recently being worked up for sepsis.   Rapid response called on 1/3/2020 for altered mental status and hypotension.     On admission, the patient was:  GCS:  Lomeli-Treviño:  modified Ryan:  ICH score:  NIHSS:    Overnight events:     ICU Vital Signs Last 24 Hrs  T(C): 37 (03 Jan 2020 19:00), Max: 38.7 (03 Jan 2020 06:36)  T(F): 98.6 (03 Jan 2020 19:00), Max: 101.6 (03 Jan 2020 06:36)  HR: 83 (04 Jan 2020 05:56) (70 - 94)  BP: 108/50 (04 Jan 2020 03:00) (66/48 - 123/59)  BP(mean): 67 (04 Jan 2020 03:00) (55 - 78)  ABP: --  ABP(mean): --  RR: 19 (04 Jan 2020 03:00) (12 - 27)  SpO2: 100% (04 Jan 2020 05:56) (95% - 100%)      01-02-20 @ 07:01  -  01-03-20 @ 07:00  --------------------------------------------------------  IN: 1630 mL / OUT: 1570 mL / NET: 60 mL    01-03-20 @ 07:01  -  01-04-20 @ 06:14  --------------------------------------------------------  IN: 2251.2 mL / OUT: 350 mL / NET: 1901.2 mL        acetaminophen    Suspension .. 650 milliGRAM(s) Oral every 4 hours PRN  albuterol/ipratropium for Nebulization 3 milliLiter(s) Nebulizer every 6 hours  atorvastatin 80 milliGRAM(s) Oral at bedtime  calcium acetate 667 milliGRAM(s) Oral three times a day with meals  chlorhexidine 4% Liquid 1 Application(s) Topical <User Schedule>  dextrose 40% Gel 15 Gram(s) Oral once PRN  dextrose 5%. 1000 milliLiter(s) (50 mL/Hr) IV Continuous <Continuous>  dextrose 50% Injectable 12.5 Gram(s) IV Push once  dextrose 50% Injectable 25 Gram(s) IV Push once  dextrose 50% Injectable 25 Gram(s) IV Push once  glucagon  Injectable 1 milliGRAM(s) IntraMuscular once PRN  guaiFENesin   Syrup  (Sugar-Free) 200 milliGRAM(s) Oral every 6 hours PRN  insulin lispro (HumaLOG) corrective regimen sliding scale   SubCutaneous every 6 hours  lacosamide Solution 50 milliGRAM(s) Oral two times a day  levETIRAcetam  Solution 250 milliGRAM(s) Oral two times a day  levETIRAcetam  Solution 125 milliGRAM(s) Oral daily  levothyroxine 50 MICROGram(s) Oral daily  meropenem  IVPB 1000 milliGRAM(s) IV Intermittent every 24 hours  metoclopramide Injectable 10 milliGRAM(s) IV Push every 8 hours PRN  midodrine. 10 milliGRAM(s) Oral every 8 hours  pantoprazole   Suspension 40 milliGRAM(s) Oral two times a day  sodium chloride 0.9%. 1000 milliLiter(s) (50 mL/Hr) IV Continuous <Continuous>                          7.9    26.85 )-----------( 324      ( 03 Jan 2020 23:04 )             26.9     01-04    126<L>  |  88<L>  |  48<H>  ----------------------------<  162<H>  3.9   |  19<L>  |  6.62<H>    Ca    8.9      04 Jan 2020 01:54  Phos  4.4     01-04  Mg     2.2     01-04    TPro  6.7  /  Alb  2.6<L>  /  TBili  0.3  /  DBili  x   /  AST  18  /  ALT  10  /  AlkPhos  78  01-03    LIVER FUNCTIONS - ( 03 Jan 2020 16:21 )  Alb: 2.6 g/dL / Pro: 6.7 g/dL / ALK PHOS: 78 U/L / ALT: 10 U/L / AST: 18 U/L / GGT: x           ABG - ( 03 Jan 2020 15:57 )  pH, Arterial: 7.44  pH, Blood: x     /  pCO2: 36    /  pO2: 134   / HCO3: 24    / Base Excess: .9    /  SaO2: 99                                        7.2    31.10 )-----------( 365      ( 03 Jan 2020 16:21 )             22.7     01-03    123<L>  |  86<L>  |  45<H>  ----------------------------<  286<H>  3.5   |  21<L>  |  5.99<H>    Ca    8.5      03 Jan 2020 16:21  Phos  4.2     01-03  Mg     2.2     01-03    CAPILLARY BLOOD GLUCOSE      POCT Blood Glucose.: 271 mg/dL (03 Jan 2020 17:32)  POCT Blood Glucose.: 288 mg/dL (03 Jan 2020 15:34)  POCT Blood Glucose.: 283 mg/dL (03 Jan 2020 11:46)  POCT Blood Glucose.: 274 mg/dL (03 Jan 2020 06:04)  POCT Blood Glucose.: 237 mg/dL (02 Jan 2020 23:28)    TPro  6.7  /  Alb  2.6<L>  /  TBili  0.3  /  DBili  x   /  AST  18  /  ALT  10  /  AlkPhos  78  01-03    LIVER FUNCTIONS - ( 03 Jan 2020 16:21 )  Alb: 2.6 g/dL / Pro: 6.7 g/dL / ALK PHOS: 78 U/L / ALT: 10 U/L / AST: 18 U/L / GGT: x           ABG - ( 03 Jan 2020 15:57 )  pH, Arterial: 7.44  pH, Blood: x     /  pCO2: 36    /  pO2: 134   / HCO3: 24    / Base Excess: .9    /  SaO2: 99            EXAMINATION:  General:  calm  HEENT:  MM slightly dry  Neuro:  hypoalert but arousable, oriented x 2, localizing briskly on LUE, trace movement on RUE. B/L LE AG movements with knee flexion when noxious stim applied.   Cards:  RRR  Respiratory:  no respiratory distress  Abomen:  soft  Extremities:  no edema  Skin:  warm/dry. No erythema. Permacath site C/D/I HPI:  Patient is a 64 year old male with a history of CAD s/p stents 2009 and 2019, HTN, DM, Carotid stenosis, Hypothyroidism, and ESRD on HD who presented to outside hospital with altered mental status as he was unresponsive, aphasic, and not moving any extremities.  Was treated with tPA and admitted for observation.  Patient noted to have seizure during hospital course and was started on Keppra.  MRI brain was done which showed bilateral supratentorial and infratentorial infarcts.  Patient was undergoing dialysis when he developed A. fib and was intubated for airway protection.  Neurology following and multiple EEGs were done which showed patient in status epilepticus despite multiple antiepilectics.  Patient transferred to [facility] for further management. (19 Dec 2019 16:37)  Was initially in NSCU with status epilepticus. Improved. [facility]   Recently being worked up for sepsis.   Rapid response called on 1/3/2020 for altered mental status and hypotension.     On admission, the patient was:  GCS:  Lomeli-Treviño:  modified Ryan:  ICH score:  NIHSS:    Overnight events:     ICU Vital Signs Last 24 Hrs  T(C): 37.2 (04 Jan 2020 07:00), Max: 37.9 (03 Jan 2020 12:00)  T(F): 99 (04 Jan 2020 07:00), Max: 100.2 (03 Jan 2020 12:00)  HR: 85 (04 Jan 2020 09:00) (70 - 94)  BP: 115/57 (04 Jan 2020 09:00) (66/48 - 123/59)  BP(mean): 75 (04 Jan 2020 09:00) (55 - 78)  RR: 18 (04 Jan 2020 09:00) (12 - 27)  SpO2: 100% (04 Jan 2020 09:00) (95% - 100%)      01-02-20 @ 07:01  -  01-03-20 @ 07:00  --------------------------------------------------------  IN: 1630 mL / OUT: 1570 mL / NET: 60 mL    01-03-20 @ 07:01  -  01-04-20 @ 06:14  --------------------------------------------------------  IN: 2251.2 mL / OUT: 350 mL / NET: 1901.2 mL        acetaminophen    Suspension .. 650 milliGRAM(s) Oral every 4 hours PRN  albuterol/ipratropium for Nebulization 3 milliLiter(s) Nebulizer every 6 hours  atorvastatin 80 milliGRAM(s) Oral at bedtime  calcium acetate 667 milliGRAM(s) Oral three times a day with meals  chlorhexidine 4% Liquid 1 Application(s) Topical <User Schedule>  dextrose 40% Gel 15 Gram(s) Oral once PRN  dextrose 5%. 1000 milliLiter(s) (50 mL/Hr) IV Continuous <Continuous>  dextrose 50% Injectable 12.5 Gram(s) IV Push once  dextrose 50% Injectable 25 Gram(s) IV Push once  dextrose 50% Injectable 25 Gram(s) IV Push once  glucagon  Injectable 1 milliGRAM(s) IntraMuscular once PRN  guaiFENesin   Syrup  (Sugar-Free) 200 milliGRAM(s) Oral every 6 hours PRN  insulin lispro (HumaLOG) corrective regimen sliding scale   SubCutaneous every 6 hours  lacosamide Solution 50 milliGRAM(s) Oral two times a day  levETIRAcetam  Solution 250 milliGRAM(s) Oral two times a day  levETIRAcetam  Solution 125 milliGRAM(s) Oral daily  levothyroxine 50 MICROGram(s) Oral daily  meropenem  IVPB 1000 milliGRAM(s) IV Intermittent every 24 hours  metoclopramide Injectable 10 milliGRAM(s) IV Push every 8 hours PRN  midodrine. 10 milliGRAM(s) Oral every 8 hours  pantoprazole   Suspension 40 milliGRAM(s) Oral two times a day  sodium chloride 0.9%. 1000 milliLiter(s) (50 mL/Hr) IV Continuous <Continuous>                          7.9    26.85 )-----------( 324      ( 03 Jan 2020 23:04 )             26.9     01-04    126<L>  |  88<L>  |  48<H>  ----------------------------<  162<H>  3.9   |  19<L>  |  6.62<H>    Ca    8.9      04 Jan 2020 01:54  Phos  4.4     01-04  Mg     2.2     01-04    TPro  6.7  /  Alb  2.6<L>  /  TBili  0.3  /  DBili  x   /  AST  18  /  ALT  10  /  AlkPhos  78  01-03    LIVER FUNCTIONS - ( 03 Jan 2020 16:21 )  Alb: 2.6 g/dL / Pro: 6.7 g/dL / ALK PHOS: 78 U/L / ALT: 10 U/L / AST: 18 U/L / GGT: x           ABG - ( 03 Jan 2020 15:57 )  pH, Arterial: 7.44  pH, Blood: x     /  pCO2: 36    /  pO2: 134   / HCO3: 24    / Base Excess: .9    /  SaO2: 99        Culture - Sputum (collected 03 Jan 2020 22:37)  Source: .Sputum Sputum  Gram Stain (04 Jan 2020 06:08):    Few polymorphonuclear leukocytes per low power field    Moderate Squamous epithelial cells per low power field    Rare Gram Variable Cocci seen per oil power field    Few Gram Variable Rods seen per oil power field    Culture - Urine (collected 02 Jan 2020 23:06)  Source: .Urine Clean Catch (Midstream)  Preliminary Report (03 Jan 2020 20:41):    >100,000 CFU/ml Gram Negative Rods    Culture - Blood (collected 02 Jan 2020 04:04)  Source: .Blood Blood-Peripheral  Preliminary Report (03 Jan 2020 05:01):    No growth to date.    Culture - Blood (collected 02 Jan 2020 00:54)  Source: .Blood Blood-Peripheral  Preliminary Report (03 Jan 2020 01:01):    No growth to date.      UA - WBC - 46           LE- Pos                                    7.2    31.10 )-----------( 365      ( 03 Jan 2020 16:21 )             22.7     01-03    123<L>  |  86<L>  |  45<H>  ----------------------------<  286<H>  3.5   |  21<L>  |  5.99<H>    Ca    8.5      03 Jan 2020 16:21  Phos  4.2     01-03  Mg     2.2     01-03    CAPILLARY BLOOD GLUCOSE      POCT Blood Glucose.: 271 mg/dL (03 Jan 2020 17:32)  POCT Blood Glucose.: 288 mg/dL (03 Jan 2020 15:34)  POCT Blood Glucose.: 283 mg/dL (03 Jan 2020 11:46)  POCT Blood Glucose.: 274 mg/dL (03 Jan 2020 06:04)  POCT Blood Glucose.: 237 mg/dL (02 Jan 2020 23:28)    TPro  6.7  /  Alb  2.6<L>  /  TBili  0.3  /  DBili  x   /  AST  18  /  ALT  10  /  AlkPhos  78  01-03    LIVER FUNCTIONS - ( 03 Jan 2020 16:21 )  Alb: 2.6 g/dL / Pro: 6.7 g/dL / ALK PHOS: 78 U/L / ALT: 10 U/L / AST: 18 U/L / GGT: x           ABG - ( 03 Jan 2020 15:57 )  pH, Arterial: 7.44  pH, Blood: x     /  pCO2: 36    /  pO2: 134   / HCO3: 24    / Base Excess: .9    /  SaO2: 99            EXAMINATION:  General:  calm  HEENT:  MM slightly dry  Neuro:  hypoalert but arousable, oriented x 2, localizing briskly on LUE, trace movement on RUE. B/L LE AG movements with knee flexion when noxious stim applied.   Cards:  RRR  Respiratory:  no respiratory distress  Abomen:  soft  Extremities:  no edema  Skin:  warm/dry. No erythema. Permacath site C/D/I HPI:  Patient is a 64 year old male with a history of CAD s/p stents 2009 and 2019, HTN, DM, Carotid stenosis, Hypothyroidism, and ESRD on HD who presented to outside hospital with altered mental status as he was unresponsive, aphasic, and not moving any extremities.  Was treated with tPA and admitted for observation.  Patient noted to have seizure during hospital course and was started on Keppra.  MRI brain was done which showed bilateral supratentorial and infratentorial infarcts.  Patient was undergoing dialysis when he developed A. fib and was intubated for airway protection.  Neurology following and multiple EEGs were done which showed patient in status epilepticus despite multiple antiepilectics.  Patient transferred to [facility] for further management. (19 Dec 2019 16:37)  Was initially in NSCU with status epilepticus. Improved. [facility]   Recently being worked up for sepsis.   Rapid response called on 1/3/2020 for altered mental status and hypotension.     On admission, the patient was:  GCS:  Lomeli-Treviño:  modified Ryan:  ICH score:  NIHSS:    Overnight events:     ICU Vital Signs Last 24 Hrs  T(C): 37.2 (04 Jan 2020 07:00), Max: 37.9 (03 Jan 2020 12:00)  T(F): 99 (04 Jan 2020 07:00), Max: 100.2 (03 Jan 2020 12:00)  HR: 85 (04 Jan 2020 09:00) (70 - 94)  BP: 115/57 (04 Jan 2020 09:00) (66/48 - 123/59)  BP(mean): 75 (04 Jan 2020 09:00) (55 - 78)  RR: 18 (04 Jan 2020 09:00) (12 - 27)  SpO2: 100% (04 Jan 2020 09:00) (95% - 100%)      01-02-20 @ 07:01  -  01-03-20 @ 07:00  --------------------------------------------------------  IN: 1630 mL / OUT: 1570 mL / NET: 60 mL    01-03-20 @ 07:01  -  01-04-20 @ 06:14  --------------------------------------------------------  IN: 2251.2 mL / OUT: 350 mL / NET: 1901.2 mL        acetaminophen    Suspension .. 650 milliGRAM(s) Oral every 4 hours PRN  albuterol/ipratropium for Nebulization 3 milliLiter(s) Nebulizer every 6 hours  atorvastatin 80 milliGRAM(s) Oral at bedtime  calcium acetate 667 milliGRAM(s) Oral three times a day with meals  chlorhexidine 4% Liquid 1 Application(s) Topical <User Schedule>  dextrose 40% Gel 15 Gram(s) Oral once PRN  dextrose 5%. 1000 milliLiter(s) (50 mL/Hr) IV Continuous <Continuous>  dextrose 50% Injectable 12.5 Gram(s) IV Push once  dextrose 50% Injectable 25 Gram(s) IV Push once  dextrose 50% Injectable 25 Gram(s) IV Push once  glucagon  Injectable 1 milliGRAM(s) IntraMuscular once PRN  guaiFENesin   Syrup  (Sugar-Free) 200 milliGRAM(s) Oral every 6 hours PRN  insulin lispro (HumaLOG) corrective regimen sliding scale   SubCutaneous every 6 hours  lacosamide Solution 50 milliGRAM(s) Oral two times a day  levETIRAcetam  Solution 250 milliGRAM(s) Oral two times a day  levETIRAcetam  Solution 125 milliGRAM(s) Oral daily  levothyroxine 50 MICROGram(s) Oral daily  meropenem  IVPB 1000 milliGRAM(s) IV Intermittent every 24 hours  metoclopramide Injectable 10 milliGRAM(s) IV Push every 8 hours PRN  midodrine. 10 milliGRAM(s) Oral every 8 hours  pantoprazole   Suspension 40 milliGRAM(s) Oral two times a day  sodium chloride 0.9%. 1000 milliLiter(s) (50 mL/Hr) IV Continuous <Continuous>                          7.9    26.85 )-----------( 324      ( 03 Jan 2020 23:04 )             26.9     01-04    126<L>  |  88<L>  |  48<H>  ----------------------------<  162<H>  3.9   |  19<L>  |  6.62<H>    Ca    8.9      04 Jan 2020 01:54  Phos  4.4     01-04  Mg     2.2     01-04    TPro  6.7  /  Alb  2.6<L>  /  TBili  0.3  /  DBili  x   /  AST  18  /  ALT  10  /  AlkPhos  78  01-03    LIVER FUNCTIONS - ( 03 Jan 2020 16:21 )  Alb: 2.6 g/dL / Pro: 6.7 g/dL / ALK PHOS: 78 U/L / ALT: 10 U/L / AST: 18 U/L / GGT: x           ABG - ( 03 Jan 2020 15:57 )  pH, Arterial: 7.44  pH, Blood: x     /  pCO2: 36    /  pO2: 134   / HCO3: 24    / Base Excess: .9    /  SaO2: 99        Culture - Sputum (collected 03 Jan 2020 22:37)  Source: .Sputum Sputum  Gram Stain (04 Jan 2020 06:08):    Few polymorphonuclear leukocytes per low power field    Moderate Squamous epithelial cells per low power field    Rare Gram Variable Cocci seen per oil power field    Few Gram Variable Rods seen per oil power field    Culture - Urine (collected 02 Jan 2020 23:06)  Source: .Urine Clean Catch (Midstream)  Preliminary Report (03 Jan 2020 20:41):    >100,000 CFU/ml Gram Negative Rods    Culture - Blood (collected 02 Jan 2020 04:04)  Source: .Blood Blood-Peripheral  Preliminary Report (03 Jan 2020 05:01):    No growth to date.    Culture - Blood (collected 02 Jan 2020 00:54)  Source: .Blood Blood-Peripheral  Preliminary Report (03 Jan 2020 01:01):    No growth to date.      UA - WBC - 46           LE- Pos                                    7.2    31.10 )-----------( 365      ( 03 Jan 2020 16:21 )             22.7     01-03    123<L>  |  86<L>  |  45<H>  ----------------------------<  286<H>  3.5   |  21<L>  |  5.99<H>    Ca    8.5      03 Jan 2020 16:21  Phos  4.2     01-03  Mg     2.2     01-03    CAPILLARY BLOOD GLUCOSE      POCT Blood Glucose.: 271 mg/dL (03 Jan 2020 17:32)  POCT Blood Glucose.: 288 mg/dL (03 Jan 2020 15:34)  POCT Blood Glucose.: 283 mg/dL (03 Jan 2020 11:46)  POCT Blood Glucose.: 274 mg/dL (03 Jan 2020 06:04)  POCT Blood Glucose.: 237 mg/dL (02 Jan 2020 23:28)    TPro  6.7  /  Alb  2.6<L>  /  TBili  0.3  /  DBili  x   /  AST  18  /  ALT  10  /  AlkPhos  78  01-03    LIVER FUNCTIONS - ( 03 Jan 2020 16:21 )  Alb: 2.6 g/dL / Pro: 6.7 g/dL / ALK PHOS: 78 U/L / ALT: 10 U/L / AST: 18 U/L / GGT: x           ABG - ( 03 Jan 2020 15:57 )  pH, Arterial: 7.44  pH, Blood: x     /  pCO2: 36    /  pO2: 134   / HCO3: 24    / Base Excess: .9    /  SaO2: 99            EXAMINATION:  General:  calm  HEENT:  MM slightly dry  Neuro:  hypoalert but arousable, oriented x 3, localizing briskly on LUE, trace movement on RUE. B/L LE AG movements with knee flexion when noxious stim applied.   Cards:  RRR  Respiratory:  no respiratory distress  Abomen:  soft; non tender pos BS  Extremities:  no edema  Skin:  warm/dry. No erythema. Permacath site C/D/I

## 2020-01-04 NOTE — PROGRESS NOTE ADULT - SUBJECTIVE AND OBJECTIVE BOX
INTERVAL HPI/OVERNIGHT EVENTS:  No new overnight event.  No N/V/D.  Tolerating diet via peg    Allergies    No Known Allergies    Intolerances    General:  No wt loss, fevers, chills, night sweats, fatigue,   Eyes:  Good vision, no reported pain  ENT:  No sore throat, pain, runny nose, dysphagia  CV:  No pain, palpitations, hypo/hypertension  Resp:  No dyspnea, cough, tachypnea, wheezing  GI:  No pain, No nausea, No vomiting, No diarrhea, No constipation, No weight loss, No fever, No pruritis, No rectal bleeding, No tarry stools, No dysphagia,  :  No pain, bleeding, incontinence, nocturia  Muscle:  No pain, weakness  Neuro:  No weakness, tingling, memory problems  Psych:  No fatigue, insomnia, mood problems, depression  Endocrine:  No polyuria, polydipsia, cold/heat intolerance  Heme:  No petechiae, ecchymosis, easy bruisability  Skin:  No rash, tattoos, scars, edema      PHYSICAL EXAM:   Vital Signs:  Vital Signs Last 24 Hrs  T(C): 36.6 (2020 15:25), Max: 37.4 (2020 18:45)  T(F): 97.9 (2020 15:25), Max: 99.4 (2020 18:45)  HR: 92 (2020 18:00) (76 - 94)  BP: 124/57 (2020 18:00) (92/47 - 131/58)  BP(mean): 75 (2020 18:00) (61 - 83)  RR: 28 (2020 18:00) (11 - 34)  SpO2: 100% (2020 18:00) (95% - 100%)  Daily     Daily Weight in k (2020 15:25)I&O's Summary    2020 07:01  -  2020 07:00  --------------------------------------------------------  IN: 2461.2 mL / OUT: 350 mL / NET: 2111.2 mL    2020 07:01  -  2020 18:14  --------------------------------------------------------  IN: 1420 mL / OUT: 1000 mL / NET: 420 mL        GENERAL:  Appears stated age, well-groomed, well-nourished, no distress  HEENT:  NC/AT,  conjunctivae clear and pink, no thyromegaly, nodules, adenopathy, no JVD, sclera -anicteric  CHEST:  Full & symmetric excursion, no increased effort, breath sounds clear  HEART:  Regular rhythm, S1, S2, no murmur/rub/S3/S4, no abdominal bruit, no edema  ABDOMEN:  Soft, non-tender, non-distended, normoactive bowel sounds,  no masses ,no hepato-splenomegaly, no signs of chronic liver disease  EXTEREMITIES:  no cyanosis,clubbing or edema  SKIN:  No rash/erythema/ecchymoses/petechiae/wounds/abscess/warm/dry  NEURO:  Alert, oriented, no asterixis, no tremor, no encephalopathy      LABS:                        7.9    26.85 )-----------( 324      ( 2020 23:04 )             26.9     01-04    132<L>  |  95<L>  |  25<H>  ----------------------------<  135<H>  4.1   |  22  |  3.71<H>    Ca    8.7      2020 16:54  Phos  4.4     01-04  Mg     2.2     -04    TPro  6.7  /  Alb  2.6<L>  /  TBili  0.3  /  DBili  x   /  AST  18  /  ALT  10  /  AlkPhos  78  01-03    PT/INR - ( 2020 23:04 )   PT: 19.0 sec;   INR: 1.64 ratio         PTT - ( 2020 23:04 )  PTT:34.3 sec  Urinalysis Basic - ( 2020 20:34 )    Color: Light Yellow / Appearance: Slightly Turbid / S.012 / pH: x  Gluc: x / Ketone: Negative  / Bili: Negative / Urobili: Negative   Blood: x / Protein: 300 mg/dL / Nitrite: Negative   Leuk Esterase: Moderate / RBC: 1 /hpf / WBC 46 /HPF   Sq Epi: x / Non Sq Epi: 0 /hpf / Bacteria: Many      amylase   lipase  RADIOLOGY & ADDITIONAL TESTS:

## 2020-01-04 NOTE — PROGRESS NOTE ADULT - ASSESSMENT
63 yo male with ESRD, HD, PAF, admitted with multiple CVA's.  Difficulty with pulmonary toilet and airway secretions, but clear CXR and CT scan.  S/P PEG  Respiratory status stable  Sptm with Enterobacter and MSSA- viewed as colonizers  Developed fever, dramatic rise in WBC  Vanco x 1  Zosyn started, now Meropenem  Repeat CT of C/A/P on 1/2 no source of infection.  Urine Cx >100k GNRs- UTI appears to be only possible link  Now afebrile, WBC lower    Suggest:  Continue Meropenem  Follow temps and CBC/diff   Await sensi's of urine isolate  D/w NSCU team  D/w son at bedside

## 2020-01-04 NOTE — PROVIDER CONTACT NOTE (CRITICAL VALUE NOTIFICATION) - SITUATION
B/L infacts
TROPONIN 1150
glucose = 40, pt is diaphoretic, FS done as well. FS =36, FS=39 consecutively

## 2020-01-04 NOTE — PROGRESS NOTE ADULT - SUBJECTIVE AND OBJECTIVE BOX
CC: f/u for fever    Patient now in NSCU, lethargic, slow to arouse, EEG in progress    REVIEW OF SYSTEMS:  limited by underlying condition.    Antimicrobials Day # 3, now Meropenem  Medications Reviewed:  albuterol/ipratropium for Nebulization 3 milliLiter(s) Nebulizer every 6 hours  apixaban 5 milliGRAM(s) Oral two times a day  atorvastatin 80 milliGRAM(s) Oral at bedtime  calcium acetate 667 milliGRAM(s) Oral three times a day with meals  chlorhexidine 4% Liquid 1 Application(s) Topical <User Schedule>  clopidogrel Tablet 75 milliGRAM(s) Oral daily  dextrose 5%. 1000 milliLiter(s) (50 mL/Hr) IV Continuous <Continuous>  dextrose 50% Injectable 12.5 Gram(s) IV Push once  dextrose 50% Injectable 25 Gram(s) IV Push once  dextrose 50% Injectable 25 Gram(s) IV Push once  epoetin cortney Injectable 68963 Unit(s) IV Push <User Schedule>  insulin lispro (HumaLOG) corrective regimen sliding scale   SubCutaneous every 6 hours  insulin NPH human recombinant 10 Unit(s) SubCutaneous every 6 hours  lacosamide Solution 50 milliGRAM(s) Oral two times a day  lactobacillus acidophilus 1 Tablet(s) Oral daily  levETIRAcetam  Solution 250 milliGRAM(s) Oral two times a day  levETIRAcetam  Solution 125 milliGRAM(s) Oral daily  levothyroxine 50 MICROGram(s) Oral daily  meropenem  IVPB 500 milliGRAM(s) IV Intermittent every 24 hours  midodrine. 10 milliGRAM(s) Oral every 8 hours  pantoprazole   Suspension 40 milliGRAM(s) Oral two times a day  sodium chloride 2% . 1000 milliLiter(s) (50 mL/Hr) IV Continuous <Continuous>    Vital Signs Last 24 Hrs  T(F): 98.1 (2020 12:20), Max: 99.4 (2020 18:45)  HR: 76 (2020 12:36) (70 - 94)  BP: 106/49 (2020 12:20) (88/54 - 123/59)  BP(mean): 69 (2020 11:00) (61 - 78)  RR: 18 (2020 12:20) (12 - 34)  SpO2: 100% (2020 12:36) (95% - 100%)    PHYSICAL EXAM:  General: withdrawn, no acute distress  Eyes:  anicteric, no conjunctival injection, no discharge  Oropharynx: no lesions or injection 	  Neck: supple, without adenopathy  R HD cath site clena  Lungs: ant rhonchi, diminished at bases  Heart: regular rate and rhythm; no murmur, rubs or gallops  Abdomen: soft, distended, nontender, G tube site clean  Skin: no lesions  Extremities: no edema  Neurologic: withdrawn, not very interactive    LAB RESULTS:                        7.9    26.85 )-----------( 324      ( 2020 23:04 )             26.9   01-04    126<L>  |  88<L>  |  48<H>  ----------------------------<  162<H>  3.9   |  19<L>  |  6.62<H>    Ca    8.9      2020 01:54  Phos  4.4       Mg     2.2         TPro  6.7  /  Alb  2.6<L>  /  TBili  0.3  /  DBili  x   /  AST  18  /  ALT  10  /  AlkPhos  78  -            Urinalysis Basic - ( 2020 20:34 )    Color: Light Yellow / Appearance: Slightly Turbid / S.012 / pH: x  Gluc: x / Ketone: Negative  / Bili: Negative / Urobili: Negative   Blood: x / Protein: 300 mg/dL / Nitrite: Negative   Leuk Esterase: Moderate / RBC: 1 /hpf / WBC 46 /HPF   Sq Epi: x / Non Sq Epi: 0 /hpf / Bacteria: Many    MICROBIOLOGY:  RECENT CULTURES:  Culture - Sputum . (20 @ 22:37)    Gram Stain:   Few polymorphonuclear leukocytes per low power field  Moderate Squamous epithelial cells per low power field  Rare Gram Variable Cocci seen per oil power field  Few Gram Variable Rods seen per oil power field    Specimen Source: .Sputum Sputum     @ 04:04 .Blood Blood-Peripheral     No growth to date.    Culture - Urine (20 @ 23:06)    Specimen Source: .Urine Clean Catch (Midstream)    Culture Results:   >100,000 CFU/ml Gram Negative Rods    RADIOLOGY REVIEWED:  Xray Chest 1 View- PORTABLE-Urgent (20 @ 11:48) >  Since 20 exam, new, mild pulmonary vascular congestive changes have developed.    CT Abdomen and Pelvis w/ Oral Cont and w/ IV Cont (20 @ 16:50) >  No bowel obstruction.  No CT evidence of acute intra-abdominal or intrathoracic pathology.

## 2020-01-04 NOTE — EEG REPORT - NS EEG TEXT BOX
[facility]   COMPREHENSIVE EPILEPSY CENTER   REPORT OF EXTENDED VIDEO EEG     [facility]: [address], [place] [address]  [facility]: [id number] [place] [id number]  [facility]: [address] [place] [address]    Patient Name: CHAD DUNBAR  Age and : 64y ([birth year])  MRN #: [id number]  Location: [place] [address]  Referring Physician: Art Ac    Start Time/Date: 00:09 on 20  End Time/Date: 08:00 on 20    _____________________________________________________________  STUDY INFORMATION    EEG Recording Technique:  The patient underwent continuous Video-EEG monitoring, using Telemetry System hardware on the XLTek Digital System. EEG and video data were stored on a computer hard drive with important events saved in digital archive files. The material was reviewed by a physician (electroencephalographer / epileptologist) on a daily basis. Ramon and seizure detection algorithms were utilized and reviewed. An EEG Technician attended to the patient, and was available throughout daytime work hours.  The epilepsy center neurologist was available in person or on call 24-hours per day.    EEG Placement and Labeling of Electrodes:  The EEG was performed utilizing 20 channel referential EEG connections (coronal over temporal over parasagittal montage) using all standard 10-20 electrode placements with EKG, with additional electrodes placed in the inferior temporal region using the modified 10-10 montage electrode placements for elective admissions, or if deemed necessary. Recording was at a sampling rate of 256 samples per second per channel. Time synchronized digital video recording was done simultaneously with EEG recording. A low light infrared camera was used for low light recording.     _____________________________________________________________  HISTORY    Patient is a 64y old  Male who presents with a chief complaint of Seizures (2020 10:12)    PERTINENT MEDICATION:  lacosamide Solution 50 milliGRAM(s) Oral two times a day  levETIRAcetam  Solution 250 milliGRAM(s) Oral two times a day  levETIRAcetam  Solution 125 milliGRAM(s) Oral daily      _____________________________________________________________  STUDY INTERPRETATION    Findings: The background was continuous, spontaneously variable and reactive. During wakefulness, the posterior dominant rhythm consisted of poorly-modulated 9 Hz activity, with amplitude to 30 uV, that attenuated to eye opening.     Background Slowing:  Background predominantly consisted of theta, delta and faster activities.    Focal Slowing:   None were present.    Sleep Background:  Drowsiness was characterized by fragmentation, attenuation, and slowing of the background activity.    Sleep was characterized by the presence of  symmetric sleep spindles and K-complexes.    Other Non-Epileptiform Findings:  None were present.    Interictal Epileptiform Activity:   None were present.    Events:  Clinical events: None recorded.  Seizures: None recorded.    Activation Procedures:   Hyperventilation was not performed.    Photic stimulation was not performed.     Artifacts:  Intermittent myogenic and movement artifacts were noted.    ECG:  The heart rate on single channel ECG was predominantly between 70-80 BPM.    _____________________________________________________________  EEG SUMMARY/CLASSIFICATION    Abnormal EEG in the awake, drowsy and asleep states.  - Mild to moderate generalized slowing.    _____________________________________________________________  EEG IMPRESSION/CLINICAL CORRELATE    Abnormal EEG study.  1. Mild to moderate nonspecific diffuse or multifocal cerebral dysfunction.   2. No epileptiform pattern or seizure seen.    _____________________________________________________________    Nadia Moyer MD  Attending Physician, [facility]

## 2020-01-04 NOTE — PROGRESS NOTE ADULT - PROBLEM SELECTOR PLAN 5
Worsened encephalopathy in the setting of sepsis 1/3  -CT head concerning for worsening stroke so patient transferred to NSCU but per neurology-just enhanced uptake in areas of old stroke. No new infarcts  -EEG negative Worsening anemia today  -Consider repeat FOB  -FOB negative  -CT A/P negative for RP hematoma

## 2020-01-04 NOTE — PROVIDER CONTACT NOTE (CRITICAL VALUE NOTIFICATION) - ACTION/TREATMENT ORDERED:
bleeding precautions maintained.
will continue to monitor Patient
awared
activated d50 25g IVP and given. will recheck FS 15mins after admin

## 2020-01-04 NOTE — PROVIDER CONTACT NOTE (CRITICAL VALUE NOTIFICATION) - BACKGROUND
ESRD on HD  HX of seizure on EEG monitor
admitted for left basal ganglia infarct stroke and s/p tpa and seizure.  RRT calld for lethargy and hypotension
s/p left basal ganglia infarct, PMHx CAD (s/p stents 2009, 2019), DM, HTN, ESRD on HD (last HD monday 12/30), hypothyroidism

## 2020-01-04 NOTE — PROGRESS NOTE ADULT - SUBJECTIVE AND OBJECTIVE BOX
NSCU ATTENDING -- ADDITIONAL PROGRESS NOTE    Nighttime rounds were performed -- please refer to earlier Progress Note for HPI details.    T(C): 36.9 (01-04-20 @ 19:00), Max: 37.2 (01-04-20 @ 07:00)  HR: 95 (01-04-20 @ 22:00) (76 - 95)  BP: 140/70 (01-04-20 @ 22:00) (97/49 - 140/70)  RR: 20 (01-04-20 @ 22:00) (11 - 34)  SpO2: 100% (01-04-20 @ 22:00) (95% - 100%)  Wt(kg): --    Relevant labwork and imaging reviewed.    Recalcitrant near-constant cough on robitussin with no effect.  Cannot put tesselon in PEG tube.  Already on protonix.  Will try advair -- if no effect, will go to very low dose codeine trial.

## 2020-01-04 NOTE — PROVIDER CONTACT NOTE (CRITICAL VALUE NOTIFICATION) - RECOMMENDATIONS
none at this time
will be blood transfusion given.
WALDO Tejada aware and came to bedside. activate 25g of D50 IVP to give then repeat FS 15mins after administration.

## 2020-01-04 NOTE — PROGRESS NOTE ADULT - ASSESSMENT
ASSESSMENT/PLAN: 66 y/o M with AMS in the setting of possible SAH. vs contrast uptake in previous infarcted tissue     NEURO:  Q1 neurochecks  CT head for stability 1/4/2020 (SAH vs contrast staining)  Hold all AC/ antiplt for now till F/U CT  KCentra given   Continue AEDs (keppra/vimpat)  If no clearance of heme may need  conventional angio to r/o Vascular source  NSGY consulted  Stroke neurology following re: L basal ganglia stroke.   MRI reviewed- watershed infarcts on prior study  DC melatonin  Activity: [] mobilize as tolerated [x ] Bedrest [] PT [] OT [] PMNR    PULM:  ABG reviewed. Not hypoxemic.   Compensated  CTA chest 12/24- neg for PNA    CV: CAD s/p stent (placed 6 months ago)  HIstory of Afib on Eliquis- Last dose at 0600 today , if contrast markedly improved will consider restarting eloquis  SBP goal <140 in setting of possible SAH  Dasia  ECHO EF 60-65%  Baseline EKG  Troponin 1153. No EKG changes  Trend trops. Repeat ECHO ordered      RENAL:  Fluids: ESRD on HD  No anuric. Does make urine (600cc today)    GI:  Diet: PEG feeds  GI prophylaxis [] not indicated [x] PPI [] other:  Bowel regimen [] colace [x] senna [] other: miralax    ENDO:   Goal euglycemia (-180)  Insulin gtt    HEME/ONC:  Hb stable  S/P KCentra in setting of SAH. Monitor coags  On Eliquis, plavix.  DC'ed.  VTE prophylaxis: [x] SCDs [] chemoprophylaxis [x] hold chemoprophylaxis due to: fresh post op  [x] high risk of DVT/PE on admission due to: stroke, immobility  LE dopplers 12/20: No DVT    ID:   Leukocytosis- rule out sepsis. No overt source of infection.  CT C/A/P 12/24 wnl   UA negative, Blood cultures thus far NGTD  Check Procal  Continue abx    MISC:    SOCIAL/FAMILY:  [] awaiting [x] updated at bedside [] family meeting    CODE STATUS:  [x] Full Code [] DNR [] DNI [] Palliative/Comfort Care      DISPOSITION:  [x] ICU [] Stroke Unit [] Floor [] EMU [] RCU [] PCU    [x] Patient is at high risk of neurologic deterioration/death due to: hemorrhage, seizures      Time spent: 45 critical care minutes    Contact: [phone] ASSESSMENT/PLAN: 66 y/o M with AMS in the setting of possible SAH. vs contrast uptake in previous infarcted tissue; prior seizures   Encephalopathy secondary to UTI/ hyponatremia    NEURO:  F/U EEG results  Q1 neurochecks  CT head for stability 1/4/2020 - improved " areas of hyperintensity in areas of old infarct.  Start - Plavix and eloquis 5 mg  q12  KCentra given   Continue AEDs (keppra/vimpat)  Stroke neurology following re: L basal ganglia stroke.   MRI reviewed- watershed infarcts on prior study  DC melatonin  Activity: [] mobilize as tolerated [x ] Bedrest [] PT [] OT [] PMNR    PULM:  Pul toilet  Suctioning   CTA chest 12/24- neg for PNA    CV: CAD s/p stent (placed 6 months ago)  HIstory of Afib on Eliquis-   See above  SBP goal <140 in setting of possible SAH  ECHO EF 60-65%  Troponin 1153.   Check Trop I to compare from prior measurements Repeat ECHO ordered      RENAL: Hyponatremia   2% at 50 cc/hr   Fluids: ESRD on HD  No anuric. Does make urine (600cc today)    GI:  Diet: PEG feeds  GI prophylaxis [] not indicated [x] PPI [] other:  Bowel regimen [] colace [x] senna [] other: miralax    ENDO:   Goal euglycemia (-180)  Insulin gtt    HEME/ONC:  Hb stable  VTE prophylaxis: [x] SCDs ; On eloquis   [x] high risk of DVT/PE on admission due to: stroke, immobility  LE dopplers 12/20: No DVT    ID:   Leukocytosis- rule out sepsis.  UTI- meropenem 500mg q day x7 days  CT C/A/P 12/24 wnl   UA negative, Blood cultures thus far NGTD  F/U cultures      SOCIAL/FAMILY:  [] awaiting [x] updated at bedside [] family meeting    CODE STATUS:  [x] Full Code [] DNR [] DNI [] Palliative/Comfort Care      DISPOSITION:  [x] ICU [] Stroke Unit [] Floor [] EMU [] RCU [] PCU    [x] Patient is at high risk of neurologic deterioration/death due to: hemorrhage, seizures      Time spent: 45 critical care minutes    Contact: [phone] ASSESSMENT/PLAN: 64 y/o M with AMS in the setting of possible SAH. vs contrast uptake in previous infarcted tissue; prior seizures   Encephalopathy secondary to UTI/ hyponatremia    NEURO:  F/U EEG results  Q1 neurochecks  CT head for stability 1/4/2020 - improved " areas of hyperintensity in areas of old infarct.  Start - Plavix and eloquis 5 mg  q12  KCentra given   Continue AEDs (keppra/vimpat)  Stroke neurology following re: L basal ganglia stroke.   MRI reviewed- watershed infarcts on prior study  DC melatonin for now  Activity: [] mobilize as tolerated [x ] Bedrest [] PT [] OT [] PMNR    PULM:  Pul toilet   Please Suction  CXR  CTA chest 12/24- neg for PNA    CV: CAD s/p stent (placed 6 months ago)  HIstory of Afib on Eliquis-   See above  SBP goal <140 in setting of possible SAH  ECHO EF 60-65%  Troponin 1153.   Check Trop I to compare from prior measurements Repeat ECHO ordered      RENAL: Hyponatremia   2% at 50 cc/hr   Fluids: ESRD on HD  No anuric. Does make urine (600cc today)    GI:  Diet: PEG feeds  GI prophylaxis [] not indicated [x] PPI [] other:  Bowel regimen [] colace [x] senna [] other: miralax    ENDO:   Goal euglycemia (-180)  Insulin gtt    HEME/ONC:  Hb stable  VTE prophylaxis: [x] SCDs ; On eloquis   [x] high risk of DVT/PE on admission due to: stroke, immobility  LE dopplers 12/20: No DVT    ID:   Leukocytosis- rule out sepsis.  UTI- meropenem 500mg q day x7 days  CT C/A/P 12/24 wnl   UA negative, Blood cultures thus far NGTD  F/U cultures      SOCIAL/FAMILY:  [] awaiting [x] updated at bedside [] family meeting    CODE STATUS:  [x] Full Code [] DNR [] DNI [] Palliative/Comfort Care      DISPOSITION:  [x] ICU [] Stroke Unit [] Floor [] EMU [] RCU [] PCU    [x] Patient is at high risk of neurologic deterioration/death due to: hemorrhage, seizures      Time spent: 45 critical care minutes    Contact: [phone]

## 2020-01-04 NOTE — PROGRESS NOTE ADULT - SUBJECTIVE AND OBJECTIVE BOX
Follow-up Pulm Progress Note    Eyes open, mostly aphasic  Intermittently following commands per staff   98% on 4L NC  Cough with scant secretions-mostly white    Medications:  MEDICATIONS  (STANDING):  albuterol/ipratropium for Nebulization 3 milliLiter(s) Nebulizer every 6 hours  apixaban 5 milliGRAM(s) Oral two times a day  atorvastatin 80 milliGRAM(s) Oral at bedtime  calcium acetate 667 milliGRAM(s) Oral three times a day with meals  chlorhexidine 4% Liquid 1 Application(s) Topical <User Schedule>  clopidogrel Tablet 75 milliGRAM(s) Oral daily  dextrose 5%. 1000 milliLiter(s) (50 mL/Hr) IV Continuous <Continuous>  dextrose 50% Injectable 12.5 Gram(s) IV Push once  dextrose 50% Injectable 25 Gram(s) IV Push once  dextrose 50% Injectable 25 Gram(s) IV Push once  epoetin cortney Injectable 15339 Unit(s) IV Push <User Schedule>  insulin lispro (HumaLOG) corrective regimen sliding scale   SubCutaneous every 6 hours  insulin NPH human recombinant 10 Unit(s) SubCutaneous every 6 hours  lacosamide Solution 50 milliGRAM(s) Oral two times a day  lactobacillus acidophilus 1 Tablet(s) Oral daily  levETIRAcetam  Solution 250 milliGRAM(s) Oral two times a day  levETIRAcetam  Solution 125 milliGRAM(s) Oral daily  levothyroxine 50 MICROGram(s) Oral daily  meropenem  IVPB 500 milliGRAM(s) IV Intermittent every 24 hours  midodrine. 10 milliGRAM(s) Oral every 8 hours  pantoprazole   Suspension 40 milliGRAM(s) Oral two times a day  sodium chloride 2% . 1000 milliLiter(s) (50 mL/Hr) IV Continuous <Continuous>    MEDICATIONS  (PRN):  acetaminophen    Suspension .. 650 milliGRAM(s) Oral every 4 hours PRN Temp greater or equal to 38C (100.4F), Mild Pain (1 - 3), Moderate Pain (4 - 6), Severe Pain (7 - 10)  dextrose 40% Gel 15 Gram(s) Oral once PRN Blood Glucose LESS THAN 70 milliGRAM(s)/deciLiter  glucagon  Injectable 1 milliGRAM(s) IntraMuscular once PRN Glucose <70 milliGRAM(s)/deciLiter  guaiFENesin   Syrup  (Sugar-Free) 200 milliGRAM(s) Oral every 6 hours PRN Cough  metoclopramide Injectable 10 milliGRAM(s) IV Push every 8 hours PRN nausea/vomiting          Vital Signs Last 24 Hrs  T(C): 36.7 (2020 12:20), Max: 37.4 (2020 18:45)  T(F): 98.1 (2020 12:20), Max: 99.4 (2020 18:45)  HR: 76 (2020 12:36) (73 - 94)  BP: 106/49 (2020 12:20) (88/54 - 123/59)  BP(mean): 69 (2020 11:00) (61 - 78)  RR: 18 (2020 12:20) (12 - 34)  SpO2: 100% (2020 12:36) (95% - 100%) on 4L NC    ABG - ( 2020 15:57 )  pH, Arterial: 7.44  pH, Blood: x     /  pCO2: 36    /  pO2: 134   / HCO3: 24    / Base Excess: .9    /  SaO2: 99                    0103 @ 07:01  -  -04 @ 07:00  --------------------------------------------------------  IN: 2461.2 mL / OUT: 350 mL / NET: 2111.2 mL          LABS:                        7.9    26.85 )-----------( 324      ( 2020 23:04 )             26.9     01-04    126<L>  |  88<L>  |  48<H>  ----------------------------<  162<H>  3.9   |  19<L>  |  6.62<H>    Ca    8.9      2020 01:54  Phos  4.4     01-04  Mg     2.2     01-04    TPro  6.7  /  Alb  2.6<L>  /  TBili  0.3  /  DBili  x   /  AST  18  /  ALT  10  /  AlkPhos  78  01-03      CARDIAC MARKERS ( 2020 16:21 )  x     / x     / 92 U/L / x     / 1.7 ng/mL      CAPILLARY BLOOD GLUCOSE      POCT Blood Glucose.: 230 mg/dL (2020 12:38)    PT/INR - ( 2020 23:04 )   PT: 19.0 sec;   INR: 1.64 ratio         PTT - ( 2020 23:04 )  PTT:34.3 sec  Urinalysis Basic - ( 2020 20:34 )    Color: Light Yellow / Appearance: Slightly Turbid / S.012 / pH: x  Gluc: x / Ketone: Negative  / Bili: Negative / Urobili: Negative   Blood: x / Protein: 300 mg/dL / Nitrite: Negative   Leuk Esterase: Moderate / RBC: 1 /hpf / WBC 46 /HPF   Sq Epi: x / Non Sq Epi: 0 /hpf / Bacteria: Many      Procalcitonin, Serum: 0.64 ng/mL (20 @ 05:56)                  CULTURES: (if applicable)  Culture Results:   No growth to date. ( @ 10:32)  Culture Results:   No growth to date. ( @ 10:32)  Culture Results:   >100,000 CFU/ml Gram Negative Rods ( @ 23:06)  Culture Results:   No growth to date. ( @ 04:04)  Culture Results:   No growth to date. ( @ 00:54)    Most recent blood culture --  @ 22:37   -- -- .Sputum Sputum  @ 22:37  Most recent blood culture --  @ 10:32   -- -- .Blood Blood-Peripheral  @ 10:32  Most recent blood culture --  @ 23:06   -- -- .Urine Clean Catch (Midstream)  @ 23:06  Most recent blood culture --  @ 04:04   -- -- .Blood Blood-Peripheral  @ 04:04  Most recent blood culture --  @ 00:54   -- -- .Blood Blood-Peripheral  @ 00:54    Blood culture  @ 22:37  --    Few polymorphonuclear leukocytes per low power field  Moderate Squamous epithelial cells per low power field  Rare Gram Variable Cocci seen per oil power field  Few Gram Variable Rods seen per oil power field  --  --  --    Urine culture    -->      Physical Examination:  PULM: Clear to auscultation bilaterally, no significant sputum production  CVS: S1, S2 heard    RADIOLOGY REVIEWED  CT chest: < from: CT Chest w/ IV Cont (20 @ 16:50) >  CHEST:     LUNGS AND LARGE AIRWAYS: Patent central airways. A 0.9 cm right lower lobe perifissural pulmonary nodule (series 3, image 67), unchanged from 2019. Additionally, a 0.4 cm rightupper lobe pulmonary nodule (series 3, image 48), unchanged. Mild bibasilar subsegmental atelectasis.  PLEURA: No pleural effusion. Biapical pleural scarring.  VESSELS: Thoracic aorta and main pulmonary artery are normal in caliber. Atherosclerotic change of the thoracic aorta. A right-sided central line terminates in the SVC/RA.  HEART: Heart size is normal. No pericardial effusion. Coronary artery atherosclerotic calcifications.  MEDIASTINUM AND AUSTIN: No lymphadenopathy.  CHEST WALL AND LOWER NECK: Within normal limits.    < end of copied text > Follow-up Pulm Progress Note    Eyes open, mostly aphasic  Intermittently following commands per staff   98% on 4L NC  Cough with scant secretions-mostly white    Medications:  MEDICATIONS  (STANDING):  albuterol/ipratropium for Nebulization 3 milliLiter(s) Nebulizer every 6 hours  apixaban 5 milliGRAM(s) Oral two times a day  atorvastatin 80 milliGRAM(s) Oral at bedtime  calcium acetate 667 milliGRAM(s) Oral three times a day with meals  chlorhexidine 4% Liquid 1 Application(s) Topical <User Schedule>  clopidogrel Tablet 75 milliGRAM(s) Oral daily  dextrose 5%. 1000 milliLiter(s) (50 mL/Hr) IV Continuous <Continuous>  dextrose 50% Injectable 12.5 Gram(s) IV Push once  dextrose 50% Injectable 25 Gram(s) IV Push once  dextrose 50% Injectable 25 Gram(s) IV Push once  epoetin cortney Injectable 34977 Unit(s) IV Push <User Schedule>  insulin lispro (HumaLOG) corrective regimen sliding scale   SubCutaneous every 6 hours  insulin NPH human recombinant 10 Unit(s) SubCutaneous every 6 hours  lacosamide Solution 50 milliGRAM(s) Oral two times a day  lactobacillus acidophilus 1 Tablet(s) Oral daily  levETIRAcetam  Solution 250 milliGRAM(s) Oral two times a day  levETIRAcetam  Solution 125 milliGRAM(s) Oral daily  levothyroxine 50 MICROGram(s) Oral daily  meropenem  IVPB 500 milliGRAM(s) IV Intermittent every 24 hours  midodrine. 10 milliGRAM(s) Oral every 8 hours  pantoprazole   Suspension 40 milliGRAM(s) Oral two times a day  sodium chloride 2% . 1000 milliLiter(s) (50 mL/Hr) IV Continuous <Continuous>    MEDICATIONS  (PRN):  acetaminophen    Suspension .. 650 milliGRAM(s) Oral every 4 hours PRN Temp greater or equal to 38C (100.4F), Mild Pain (1 - 3), Moderate Pain (4 - 6), Severe Pain (7 - 10)  dextrose 40% Gel 15 Gram(s) Oral once PRN Blood Glucose LESS THAN 70 milliGRAM(s)/deciLiter  glucagon  Injectable 1 milliGRAM(s) IntraMuscular once PRN Glucose <70 milliGRAM(s)/deciLiter  guaiFENesin   Syrup  (Sugar-Free) 200 milliGRAM(s) Oral every 6 hours PRN Cough  metoclopramide Injectable 10 milliGRAM(s) IV Push every 8 hours PRN nausea/vomiting          Vital Signs Last 24 Hrs  T(C): 36.7 (2020 12:20), Max: 37.4 (2020 18:45)  T(F): 98.1 (2020 12:20), Max: 99.4 (2020 18:45)  HR: 76 (2020 12:36) (73 - 94)  BP: 106/49 (2020 12:20) (88/54 - 123/59)  BP(mean): 69 (2020 11:00) (61 - 78)  RR: 18 (2020 12:20) (12 - 34)  SpO2: 100% (2020 12:36) (95% - 100%) on 4L NC    ABG - ( 2020 15:57 )  pH, Arterial: 7.44  pH, Blood: x     /  pCO2: 36    /  pO2: 134   / HCO3: 24    / Base Excess: .9    /  SaO2: 99                    0103 @ 07:01  -  -04 @ 07:00  --------------------------------------------------------  IN: 2461.2 mL / OUT: 350 mL / NET: 2111.2 mL          LABS:                        7.9    26.85 )-----------( 324      ( 2020 23:04 )             26.9     01-04    126<L>  |  88<L>  |  48<H>  ----------------------------<  162<H>  3.9   |  19<L>  |  6.62<H>    Ca    8.9      2020 01:54  Phos  4.4     01-04  Mg     2.2     01-04    TPro  6.7  /  Alb  2.6<L>  /  TBili  0.3  /  DBili  x   /  AST  18  /  ALT  10  /  AlkPhos  78  01-03      CARDIAC MARKERS ( 2020 16:21 )  x     / x     / 92 U/L / x     / 1.7 ng/mL      CAPILLARY BLOOD GLUCOSE      POCT Blood Glucose.: 230 mg/dL (2020 12:38)    PT/INR - ( 2020 23:04 )   PT: 19.0 sec;   INR: 1.64 ratio         PTT - ( 2020 23:04 )  PTT:34.3 sec  Urinalysis Basic - ( 2020 20:34 )    Color: Light Yellow / Appearance: Slightly Turbid / S.012 / pH: x  Gluc: x / Ketone: Negative  / Bili: Negative / Urobili: Negative   Blood: x / Protein: 300 mg/dL / Nitrite: Negative   Leuk Esterase: Moderate / RBC: 1 /hpf / WBC 46 /HPF   Sq Epi: x / Non Sq Epi: 0 /hpf / Bacteria: Many      Procalcitonin, Serum: 0.64 ng/mL (20 @ 05:56)                  CULTURES: (if applicable)  Culture Results:   No growth to date. ( @ 10:32)  Culture Results:   No growth to date. ( @ 10:32)  Culture Results:   >100,000 CFU/ml Gram Negative Rods ( @ 23:06)  Culture Results:   No growth to date. ( @ 04:04)  Culture Results:   No growth to date. ( @ 00:54)    Most recent blood culture --  @ 22:37   -- -- .Sputum Sputum  @ 22:37  Most recent blood culture --  @ 10:32   -- -- .Blood Blood-Peripheral  @ 10:32  Most recent blood culture --  @ 23:06   -- -- .Urine Clean Catch (Midstream)  @ 23:06  Most recent blood culture --  @ 04:04   -- -- .Blood Blood-Peripheral  @ 04:04  Most recent blood culture --  @ 00:54   -- -- .Blood Blood-Peripheral  @ 00:54    Blood culture  @ 22:37  --    Few polymorphonuclear leukocytes per low power field  Moderate Squamous epithelial cells per low power field  Rare Gram Variable Cocci seen per oil power field  Few Gram Variable Rods seen per oil power field  --  --  --    Urine culture    -->      Physical Examination:  PULM: Clear to auscultation bilaterally, no significant sputum production  CVS: S1, S2 heard    RADIOLOGY REVIEWED  CT chest: < from: CT Chest w/ IV Cont (20 @ 16:50) >  CHEST:     LUNGS AND LARGE AIRWAYS: Patent central airways. A 0.9 cm right lower lobe perifissural pulmonary nodule (series 3, image 67), unchanged from 2019. Additionally, a 0.4 cm rightupper lobe pulmonary nodule (series 3, image 48), unchanged. Mild bibasilar subsegmental atelectasis.  PLEURA: No pleural effusion. Biapical pleural scarring.  VESSELS: Thoracic aorta and main pulmonary artery are normal in caliber. Atherosclerotic change of the thoracic aorta. A right-sided central line terminates in the SVC/RA.  HEART: Heart size is normal. No pericardial effusion. Coronary artery atherosclerotic calcifications.  MEDIASTINUM AND AUSTIN: No lymphadenopathy.  CHEST WALL AND LOWER NECK: Within normal limits.    < end of copied text >      CXR: Mild pulm edema

## 2020-01-04 NOTE — PROGRESS NOTE ADULT - ASSESSMENT
65 y/o M with PMH of CAD s/p stents 2009 and 2019, HTN, DM, Carotid stenosis, Hypothyroidism, and ESRD on HD who presented to outside hospital with AMS. Found to have new onset Afib and bilateral extensive bihemispheric infarcts. s/p tPA at outside hospital. Hospital course c/b status epilepticus. Intubated for airway protection, extubated 12/21. Called to eval 12/26 for secretions, leukocytosis. S/p PEG placement 12/27. Hospital course c/b acute and dramatic leukocytosis on 1/1 and fevers, likely 2nd UTI. Worsened encephalopathy on 1/3, was transferred to [facility] 2nd concern for worsened stroke.

## 2020-01-04 NOTE — PROGRESS NOTE ADULT - ASSESSMENT
ESRD, on HD. Last HD was yesterday but terminated prematurely. On schedule for today. BP is in the low 100's and he appears euvolemic.    Sepsis, possible UTI.    CVA, possible SAH also. Followed by Neurosurgery.    Anemia of ESRD.    Hyponatremia, Clinically euvolemic.    PLAN:  1.	HD today, 2 L to attempted with 3 K bath as K is on the low side.  2.	EPO at HD.  3.	Continue Antibiotics.   4.	No treatment for Hyponatremia for now as Dialysis with correct the Na and volume.

## 2020-01-04 NOTE — EEG REPORT - NS EEG TEXT BOX
[facility]   COMPREHENSIVE EPILEPSY CENTER   REPORT OF ROUTINE VIDEO EEG     [facility]: [address] [place] [address] [address], [place] [address], Ph#: [phone]  [facility]: [address], [place] [address], Ph#: [phone]  [facility]: [address] [place] [address], Ph#: [phone]    Patient Name: CHAD DUNBAR  Age and : 64y ([birth year])  MRN #: [id number]  Location: [place] [address]  Referring Physician: Art Ac    Study Date: 20    _____________________________________________________________  TECHNICAL INFORMATION    Placement and Labeling of Electrodes:  The EEG was performed utilizing 20 channels referential EEG connections (coronal over temporal over parasagittal montage) using all standard 10-20 electrode placements with EKG.  Recording was at a sampling rate of 256 samples per second per channel.  Time synchronized digital video recording was done simultaneously with EEG recording.  A low light infrared camera was used for low light recording.  Ramon and seizure detection algorithms were utilized.    _____________________________________________________________  HISTORY    Patient is a 64y old  Male who presents with a chief complaint of Seizures (2020 10:12)      PERTINENT MEDICATION:  lacosamide Solution 50 milliGRAM(s) Oral two times a day  levETIRAcetam  Solution 250 milliGRAM(s) Oral two times a day  levETIRAcetam  Solution 125 milliGRAM(s) Oral daily      _____________________________________________________________  STUDY INTERPRETATION    Findings: The background was continuous, spontaneously variable and reactive. No definitive posterior dominant rhythm seen.    Background Slowing:  Background predominantly consisted of theta, delta and faster activities.    Focal Slowing:   None were present.    Sleep Background:  Drowsiness was characterized by fragmentation, attenuation, and slowing of the background activity.    Sleep was characterized by the presence of  symmetric sleep spindles and K-complexes.    Other Non-Epileptiform Findings:  None were present.    Interictal Epileptiform Activity:   None were present.    Events:  Clinical events: None recorded.  Seizures: None recorded.    Activation Procedures:   Hyperventilation was not performed.    Photic stimulation was not performed.     Artifacts:  Intermittent myogenic and movement artifacts were noted.    ECG:  The heart rate on single channel ECG was predominantly between 70-80 BPM.    _____________________________________________________________  EEG SUMMARY/CLASSIFICATION    Abnormal EEG in the awake, drowsy and asleep states.  - Moderate generalized slowing.    _____________________________________________________________  EEG IMPRESSION/CLINICAL CORRELATE    Abnormal EEG study.  1. Moderate nonspecific diffuse or multifocal cerebral dysfunction.   2. No epileptiform pattern or seizure seen.    _____________________________________________________________    Nadia Moyer MD  Attending Physician, [facility]ile[facility] [place]

## 2020-01-04 NOTE — PROGRESS NOTE ADULT - PROBLEM SELECTOR PLAN 1
with fever of yet unclear etiology   -UTI seems to be only source at this point  -Changed to Meropenem with improvement in fever curve and downtrend in WBC   -Blood cultures thus far negative, RVP negative  -CT C/A/P without evidence of occult infection

## 2020-01-04 NOTE — PROGRESS NOTE ADULT - PROBLEM SELECTOR PLAN 3
with NGT  -S/p PEG placement 12/27 New pulm vasc congestion CXR today   -Volume resuscitated yesterday   -Plan for HD today

## 2020-01-04 NOTE — PROGRESS NOTE ADULT - SUBJECTIVE AND OBJECTIVE BOX
CHAD DUNBAR  64y  Patient is a 64y old  Male who presents with a chief complaint of Seizures (2020 06:13)    HPI:  This is a patient with ESRD, admitted for CVA. S/P Hypotension at HD with termination treatment.  Sedated, not intubated. Not overloaded at this time.  Of note he has leukocytosis.    HEALTH ISSUES - PROBLEM Dx:  Discharge planning issues: Discharge planning issues  Prophylactic measure: Prophylactic measure  Septic shock due to undetermined organism: Septic shock due to undetermined organism  Sepsis without acute organ dysfunction, due to unspecified organism: Sepsis without acute organ dysfunction, due to unspecified organism  Type 2 diabetes mellitus with other circulatory complication, with long-term current use of insulin: Type 2 diabetes mellitus with other circulatory complication, with long-term current use of insulin  Atrial fibrillation, unspecified type: Atrial fibrillation, unspecified type  Coronary artery disease, angina presence unspecified, unspecified vessel or lesion type, unspecified whether native or transplanted heart: Coronary artery disease, angina presence unspecified, unspecified vessel or lesion type, unspecified whether native or transplanted heart  ESRD (end stage renal disease): ESRD (end stage renal disease)  Cerebrovascular accident (CVA) due to embolism of cerebral artery: Cerebrovascular accident (CVA) due to embolism of cerebral artery  Status epilepticus: Status epilepticus  Leukocytosis, unspecified type: Leukocytosis, unspecified type  Anemia, unspecified type: Anemia, unspecified type  ACP (advance care planning): ACP (advance care planning)  CVA (cerebral vascular accident): CVA (cerebral vascular accident)  Anemia: Anemia  Leukocytosis: Leukocytosis  Dysphagia: Dysphagia  Cough: Cough        MEDICATIONS  (STANDING):  albuterol/ipratropium for Nebulization 3 milliLiter(s) Nebulizer every 6 hours  apixaban 5 milliGRAM(s) Oral two times a day  atorvastatin 80 milliGRAM(s) Oral at bedtime  calcium acetate 667 milliGRAM(s) Oral three times a day with meals  chlorhexidine 4% Liquid 1 Application(s) Topical <User Schedule>  clopidogrel Tablet 75 milliGRAM(s) Oral daily  dextrose 5%. 1000 milliLiter(s) (50 mL/Hr) IV Continuous <Continuous>  dextrose 50% Injectable 12.5 Gram(s) IV Push once  dextrose 50% Injectable 25 Gram(s) IV Push once  dextrose 50% Injectable 25 Gram(s) IV Push once  insulin lispro (HumaLOG) corrective regimen sliding scale   SubCutaneous every 6 hours  lacosamide Solution 50 milliGRAM(s) Oral two times a day  lactobacillus acidophilus 1 Tablet(s) Oral daily  levETIRAcetam  Solution 250 milliGRAM(s) Oral two times a day  levETIRAcetam  Solution 125 milliGRAM(s) Oral daily  levothyroxine 50 MICROGram(s) Oral daily  meropenem  IVPB 1000 milliGRAM(s) IV Intermittent every 24 hours  midodrine. 10 milliGRAM(s) Oral every 8 hours  pantoprazole   Suspension 40 milliGRAM(s) Oral two times a day  sodium chloride 2% . 1000 milliLiter(s) (50 mL/Hr) IV Continuous <Continuous>    MEDICATIONS  (PRN):  acetaminophen    Suspension .. 650 milliGRAM(s) Oral every 4 hours PRN Temp greater or equal to 38C (100.4F), Mild Pain (1 - 3), Moderate Pain (4 - 6), Severe Pain (7 - 10)  dextrose 40% Gel 15 Gram(s) Oral once PRN Blood Glucose LESS THAN 70 milliGRAM(s)/deciLiter  glucagon  Injectable 1 milliGRAM(s) IntraMuscular once PRN Glucose <70 milliGRAM(s)/deciLiter  guaiFENesin   Syrup  (Sugar-Free) 200 milliGRAM(s) Oral every 6 hours PRN Cough  metoclopramide Injectable 10 milliGRAM(s) IV Push every 8 hours PRN nausea/vomiting    Vital Signs Last 24 Hrs  T(C): 37.2 (2020 07:00), Max: 37.9 (2020 12:00)  T(F): 99 (2020 07:00), Max: 100.2 (2020 12:00)  HR: 85 (2020 09:00) (70 - 94)  BP: 115/57 (2020 09:00) (66/48 - 123/59)  BP(mean): 75 (2020 09:00) (55 - 78)  RR: 18 (2020 09:00) (12 - 27)  SpO2: 100% (2020 09:00) (95% - 100%)  Daily     Daily     PHYSICAL EXAM:  Constitutional:  He appears comfortable and not distressed. Not diaphoretic.    Neck:  The thyroid is normal. Trachea is midline.     Respiratory: The lungs are clear to auscultation. No dullness and expansion is normal. Permacath to right ACW.    Cardiovascular: S1 and S2 are normal. No mummurs, rubs or gallops are present.    Gastrointestinal: The abdomen is soft. No tenderness is present. No masses are present. Bowel sounds are normal.    Genitourinary: The bladder is not distended. No CVA tenderness is present.    Extremities: No edema is noted. No deformities are present.    Neurological: Appears sedated.    Skin: No lesions are seen  or palpated.                            7.9    26.85 )-----------( 324      ( 2020 23:04 )             26.9     01-04    126<L>  |  88<L>  |  48<H>  ----------------------------<  162<H>  3.9   |  19<L>  |  6.62<H>    Ca    8.9      2020 01:54  Phos  4.4     01-04  Mg     2.2     01-04    TPro  6.7  /  Alb  2.6<L>  /  TBili  0.3  /  DBili  x   /  AST  18  /  ALT  10  /  AlkPhos  78  01-03    Urinalysis Basic - ( 2020 20:34 )    Color: Light Yellow / Appearance: Slightly Turbid / S.012 / pH: x  Gluc: x / Ketone: Negative  / Bili: Negative / Urobili: Negative   Blood: x / Protein: 300 mg/dL / Nitrite: Negative   Leuk Esterase: Moderate / RBC: 1 /hpf / WBC 46 /HPF   Sq Epi: x / Non Sq Epi: 0 /hpf / Bacteria: Many

## 2020-01-05 LAB
-  AMIKACIN: SIGNIFICANT CHANGE UP
-  AMPICILLIN/SULBACTAM: SIGNIFICANT CHANGE UP
-  CEFEPIME: SIGNIFICANT CHANGE UP
-  CEFTAZIDIME: SIGNIFICANT CHANGE UP
-  CEFTRIAXONE: SIGNIFICANT CHANGE UP
-  CIPROFLOXACIN: SIGNIFICANT CHANGE UP
-  GENTAMICIN: SIGNIFICANT CHANGE UP
-  LEVOFLOXACIN: SIGNIFICANT CHANGE UP
-  MEROPENEM: SIGNIFICANT CHANGE UP
-  TOBRAMYCIN: SIGNIFICANT CHANGE UP
-  TRIMETHOPRIM/SULFAMETHOXAZOLE: SIGNIFICANT CHANGE UP
ANION GAP SERPL CALC-SCNC: 16 MMOL/L — SIGNIFICANT CHANGE UP (ref 5–17)
BUN SERPL-MCNC: 50 MG/DL — HIGH (ref 7–23)
CALCIUM SERPL-MCNC: 9.5 MG/DL — SIGNIFICANT CHANGE UP (ref 8.4–10.5)
CHLORIDE SERPL-SCNC: 98 MMOL/L — SIGNIFICANT CHANGE UP (ref 96–108)
CO2 SERPL-SCNC: 22 MMOL/L — SIGNIFICANT CHANGE UP (ref 22–31)
CREAT SERPL-MCNC: 6.46 MG/DL — HIGH (ref 0.5–1.3)
CULTURE RESULTS: SIGNIFICANT CHANGE UP
GLUCOSE BLDC GLUCOMTR-MCNC: 216 MG/DL — HIGH (ref 70–99)
GLUCOSE SERPL-MCNC: 94 MG/DL — SIGNIFICANT CHANGE UP (ref 70–99)
HCT VFR BLD CALC: 28.8 % — LOW (ref 39–50)
HGB BLD-MCNC: 8.6 G/DL — LOW (ref 13–17)
MAGNESIUM SERPL-MCNC: 2.4 MG/DL — SIGNIFICANT CHANGE UP (ref 1.6–2.6)
MCHC RBC-ENTMCNC: 28.5 PG — SIGNIFICANT CHANGE UP (ref 27–34)
MCHC RBC-ENTMCNC: 29.9 GM/DL — LOW (ref 32–36)
MCV RBC AUTO: 95.4 FL — SIGNIFICANT CHANGE UP (ref 80–100)
METHOD TYPE: SIGNIFICANT CHANGE UP
NRBC # BLD: 0 /100 WBCS — SIGNIFICANT CHANGE UP (ref 0–0)
PHOSPHATE SERPL-MCNC: 2.9 MG/DL — SIGNIFICANT CHANGE UP (ref 2.5–4.5)
PLATELET # BLD AUTO: 367 K/UL — SIGNIFICANT CHANGE UP (ref 150–400)
POTASSIUM SERPL-MCNC: 3.8 MMOL/L — SIGNIFICANT CHANGE UP (ref 3.5–5.3)
POTASSIUM SERPL-SCNC: 3.8 MMOL/L — SIGNIFICANT CHANGE UP (ref 3.5–5.3)
RBC # BLD: 3.02 M/UL — LOW (ref 4.2–5.8)
RBC # FLD: 15.6 % — HIGH (ref 10.3–14.5)
SODIUM SERPL-SCNC: 136 MMOL/L — SIGNIFICANT CHANGE UP (ref 135–145)
SPECIMEN SOURCE: SIGNIFICANT CHANGE UP
WBC # BLD: 18.55 K/UL — HIGH (ref 3.8–10.5)
WBC # FLD AUTO: 18.55 K/UL — HIGH (ref 3.8–10.5)

## 2020-01-05 PROCEDURE — 99291 CRITICAL CARE FIRST HOUR: CPT

## 2020-01-05 PROCEDURE — 99292 CRITICAL CARE ADDL 30 MIN: CPT

## 2020-01-05 PROCEDURE — 93010 ELECTROCARDIOGRAM REPORT: CPT

## 2020-01-05 PROCEDURE — 70450 CT HEAD/BRAIN W/O DYE: CPT | Mod: 26

## 2020-01-05 PROCEDURE — 95718 EEG PHYS/QHP 2-12 HR W/VEEG: CPT

## 2020-01-05 RX ORDER — FAMOTIDINE 10 MG/ML
20 INJECTION INTRAVENOUS
Refills: 0 | Status: DISCONTINUED | OUTPATIENT
Start: 2020-01-05 | End: 2020-01-14

## 2020-01-05 RX ORDER — GUAIFENESIN/DEXTROMETHORPHAN 600MG-30MG
5 TABLET, EXTENDED RELEASE 12 HR ORAL EVERY 4 HOURS
Refills: 0 | Status: DISCONTINUED | OUTPATIENT
Start: 2020-01-05 | End: 2020-01-05

## 2020-01-05 RX ORDER — FLUCONAZOLE 150 MG/1
100 TABLET ORAL DAILY
Refills: 0 | Status: DISCONTINUED | OUTPATIENT
Start: 2020-01-05 | End: 2020-01-14

## 2020-01-05 RX ORDER — CODEINE SULFATE 60 MG/1
15 TABLET ORAL EVERY 4 HOURS
Refills: 0 | Status: DISCONTINUED | OUTPATIENT
Start: 2020-01-05 | End: 2020-01-07

## 2020-01-05 RX ORDER — GUAIFENESIN/DEXTROMETHORPHAN 600MG-30MG
10 TABLET, EXTENDED RELEASE 12 HR ORAL EVERY 4 HOURS
Refills: 0 | Status: DISCONTINUED | OUTPATIENT
Start: 2020-01-05 | End: 2020-01-05

## 2020-01-05 RX ORDER — ACETAMINOPHEN WITH CODEINE 300MG-30MG
5 TABLET ORAL EVERY 4 HOURS
Refills: 0 | Status: DISCONTINUED | OUTPATIENT
Start: 2020-01-05 | End: 2020-01-05

## 2020-01-05 RX ORDER — FAMOTIDINE 10 MG/ML
20 INJECTION INTRAVENOUS
Refills: 0 | Status: DISCONTINUED | OUTPATIENT
Start: 2020-01-05 | End: 2020-01-05

## 2020-01-05 RX ORDER — HUMAN INSULIN 100 [IU]/ML
5 INJECTION, SUSPENSION SUBCUTANEOUS ONCE
Refills: 0 | Status: COMPLETED | OUTPATIENT
Start: 2020-01-05 | End: 2020-01-05

## 2020-01-05 RX ORDER — ACETAMINOPHEN WITH CODEINE 300MG-30MG
1 TABLET ORAL EVERY 4 HOURS
Refills: 0 | Status: DISCONTINUED | OUTPATIENT
Start: 2020-01-05 | End: 2020-01-05

## 2020-01-05 RX ADMIN — PANTOPRAZOLE SODIUM 40 MILLIGRAM(S): 20 TABLET, DELAYED RELEASE ORAL at 05:04

## 2020-01-05 RX ADMIN — APIXABAN 5 MILLIGRAM(S): 2.5 TABLET, FILM COATED ORAL at 05:03

## 2020-01-05 RX ADMIN — HUMAN INSULIN 5 UNIT(S): 100 INJECTION, SUSPENSION SUBCUTANEOUS at 23:20

## 2020-01-05 RX ADMIN — Medication 50 MICROGRAM(S): at 05:04

## 2020-01-05 RX ADMIN — Medication 3 MILLILITER(S): at 17:59

## 2020-01-05 RX ADMIN — Medication 667 MILLIGRAM(S): at 21:46

## 2020-01-05 RX ADMIN — LEVETIRACETAM 250 MILLIGRAM(S): 250 TABLET, FILM COATED ORAL at 05:04

## 2020-01-05 RX ADMIN — LACOSAMIDE 50 MILLIGRAM(S): 50 TABLET ORAL at 05:04

## 2020-01-05 RX ADMIN — MEROPENEM 100 MILLIGRAM(S): 1 INJECTION INTRAVENOUS at 17:46

## 2020-01-05 RX ADMIN — Medication 3 MILLILITER(S): at 11:20

## 2020-01-05 RX ADMIN — LEVETIRACETAM 125 MILLIGRAM(S): 250 TABLET, FILM COATED ORAL at 12:14

## 2020-01-05 RX ADMIN — APIXABAN 5 MILLIGRAM(S): 2.5 TABLET, FILM COATED ORAL at 17:42

## 2020-01-05 RX ADMIN — SODIUM CHLORIDE 50 MILLILITER(S): 5 INJECTION, SOLUTION INTRAVENOUS at 06:03

## 2020-01-05 RX ADMIN — Medication 3 MILLILITER(S): at 23:41

## 2020-01-05 RX ADMIN — Medication 4: at 17:45

## 2020-01-05 RX ADMIN — MIDODRINE HYDROCHLORIDE 10 MILLIGRAM(S): 2.5 TABLET ORAL at 13:36

## 2020-01-05 RX ADMIN — CODEINE SULFATE 15 MILLIGRAM(S): 60 TABLET ORAL at 18:31

## 2020-01-05 RX ADMIN — CODEINE SULFATE 15 MILLIGRAM(S): 60 TABLET ORAL at 12:14

## 2020-01-05 RX ADMIN — FLUCONAZOLE 100 MILLIGRAM(S): 150 TABLET ORAL at 12:14

## 2020-01-05 RX ADMIN — CHLORHEXIDINE GLUCONATE 1 APPLICATION(S): 213 SOLUTION TOPICAL at 21:46

## 2020-01-05 RX ADMIN — LACOSAMIDE 50 MILLIGRAM(S): 50 TABLET ORAL at 17:40

## 2020-01-05 RX ADMIN — Medication 1 TABLET(S): at 12:14

## 2020-01-05 RX ADMIN — MIDODRINE HYDROCHLORIDE 10 MILLIGRAM(S): 2.5 TABLET ORAL at 05:04

## 2020-01-05 RX ADMIN — CLOPIDOGREL BISULFATE 75 MILLIGRAM(S): 75 TABLET, FILM COATED ORAL at 12:14

## 2020-01-05 RX ADMIN — MIDODRINE HYDROCHLORIDE 10 MILLIGRAM(S): 2.5 TABLET ORAL at 21:46

## 2020-01-05 RX ADMIN — HUMAN INSULIN 10 UNIT(S): 100 INJECTION, SUSPENSION SUBCUTANEOUS at 17:45

## 2020-01-05 RX ADMIN — LEVETIRACETAM 250 MILLIGRAM(S): 250 TABLET, FILM COATED ORAL at 17:41

## 2020-01-05 RX ADMIN — Medication 1 TABLET(S): at 02:11

## 2020-01-05 RX ADMIN — Medication 1 TABLET(S): at 08:00

## 2020-01-05 RX ADMIN — Medication 667 MILLIGRAM(S): at 05:03

## 2020-01-05 RX ADMIN — Medication 10 MILLILITER(S): at 05:04

## 2020-01-05 RX ADMIN — Medication 3 MILLILITER(S): at 06:23

## 2020-01-05 RX ADMIN — CODEINE SULFATE 15 MILLIGRAM(S): 60 TABLET ORAL at 17:41

## 2020-01-05 RX ADMIN — Medication 1 TABLET(S): at 07:13

## 2020-01-05 RX ADMIN — ATORVASTATIN CALCIUM 80 MILLIGRAM(S): 80 TABLET, FILM COATED ORAL at 21:46

## 2020-01-05 RX ADMIN — CODEINE SULFATE 15 MILLIGRAM(S): 60 TABLET ORAL at 13:00

## 2020-01-05 RX ADMIN — Medication 667 MILLIGRAM(S): at 13:36

## 2020-01-05 RX ADMIN — CODEINE SULFATE 15 MILLIGRAM(S): 60 TABLET ORAL at 21:46

## 2020-01-05 NOTE — EEG REPORT - NS EEG TEXT BOX
[facility]   COMPREHENSIVE EPILEPSY CENTER   REPORT OF CONTINUOUS VIDEO EEG     [facility]: [address], [place] [address]  [facility]: [id number] [place] [id number]  [facility]: [address] [place] [address]    Patient Name: CHAD DUNBAR  Age and : 64y ([birth year])  MRN #: [id number]  Location: [place] [address]  Referring Physician: Art Ac    Start Time/Date: 08:00 on 20  End Time/Date: 08:00 on 20    _____________________________________________________________  STUDY INFORMATION    EEG Recording Technique:  The patient underwent continuous Video-EEG monitoring, using Telemetry System hardware on the XLTek Digital System. EEG and video data were stored on a computer hard drive with important events saved in digital archive files. The material was reviewed by a physician (electroencephalographer / epileptologist) on a daily basis. Ramon and seizure detection algorithms were utilized and reviewed. An EEG Technician attended to the patient, and was available throughout daytime work hours.  The epilepsy center neurologist was available in person or on call 24-hours per day.    EEG Placement and Labeling of Electrodes:  The EEG was performed utilizing 20 channel referential EEG connections (coronal over temporal over parasagittal montage) using all standard 10-20 electrode placements with EKG, with additional electrodes placed in the inferior temporal region using the modified 10-10 montage electrode placements for elective admissions, or if deemed necessary. Recording was at a sampling rate of 256 samples per second per channel. Time synchronized digital video recording was done simultaneously with EEG recording. A low light infrared camera was used for low light recording.     _____________________________________________________________  HISTORY    Patient is a 64y old  Male who presents with a chief complaint of Seizures (2020 10:12)    PERTINENT MEDICATION:  lacosamide Solution 50 milliGRAM(s) Oral two times a day  levETIRAcetam  Solution 250 milliGRAM(s) Oral two times a day  levETIRAcetam  Solution 125 milliGRAM(s) Oral daily    _____________________________________________________________  STUDY INTERPRETATION    Findings: The background was continuous, spontaneously variable and reactive. During wakefulness, the posterior dominant rhythm consisted of poorly-modulated 9 Hz activity, with amplitude to 30 uV, that attenuated to eye opening.     Background Slowing:  Background predominantly consisted of theta, delta and faster activities.    Focal Slowing:   None were present.    Sleep Background:  Drowsiness was characterized by fragmentation, attenuation, and slowing of the background activity.    Sleep was characterized by the presence of  symmetric sleep spindles and K-complexes.    Other Non-Epileptiform Findings:  None were present.    Interictal Epileptiform Activity:   None were present.    Events:  Clinical events: None recorded.  Seizures: None recorded.    Activation Procedures:   Hyperventilation was not performed.    Photic stimulation was not performed.     Artifacts:  Intermittent myogenic and movement artifacts were noted.    ECG:  The heart rate on single channel ECG was predominantly between 70-80 BPM.    _____________________________________________________________  EEG SUMMARY/CLASSIFICATION    Abnormal EEG in the awake, drowsy and asleep states.  - Mild to moderate generalized slowing.    _____________________________________________________________  EEG IMPRESSION/CLINICAL CORRELATE    Abnormal EEG study.  1. Mild to moderate nonspecific diffuse or multifocal cerebral dysfunction.   2. No epileptiform pattern or seizure seen.    Today's study demonstrated no significant change from prior 24hr recording.     _____________________________________________________________    Nadia Moyer MD  Attending Physician, [facility]   COMPREHENSIVE EPILEPSY CENTER   REPORT OF CONTINUOUS VIDEO EEG     [facility]: [address], [place] [address]  [facility]: [id number] [place] [id number]  [facility]: [address] [place] [address]    Patient Name: CHAD DUNBAR  Age and : 64y ([birth year])  MRN #: [id number]  Location: [place] [address]  Referring Physician: Art Ac    Start Time/Date: 08:00 on 20  End Time/Date: 02:45 on 20    _____________________________________________________________  STUDY INFORMATION    EEG Recording Technique:  The patient underwent continuous Video-EEG monitoring, using Telemetry System hardware on the XLTek Digital System. EEG and video data were stored on a computer hard drive with important events saved in digital archive files. The material was reviewed by a physician (electroencephalographer / epileptologist) on a daily basis. Ramon and seizure detection algorithms were utilized and reviewed. An EEG Technician attended to the patient, and was available throughout daytime work hours.  The epilepsy center neurologist was available in person or on call 24-hours per day.    EEG Placement and Labeling of Electrodes:  The EEG was performed utilizing 20 channel referential EEG connections (coronal over temporal over parasagittal montage) using all standard 10-20 electrode placements with EKG, with additional electrodes placed in the inferior temporal region using the modified 10-10 montage electrode placements for elective admissions, or if deemed necessary. Recording was at a sampling rate of 256 samples per second per channel. Time synchronized digital video recording was done simultaneously with EEG recording. A low light infrared camera was used for low light recording.     _____________________________________________________________  HISTORY    Patient is a 64y old  Male who presents with a chief complaint of Seizures (2020 10:12)    PERTINENT MEDICATION:  lacosamide Solution 50 milliGRAM(s) Oral two times a day  levETIRAcetam  Solution 250 milliGRAM(s) Oral two times a day  levETIRAcetam  Solution 125 milliGRAM(s) Oral daily    _____________________________________________________________  STUDY INTERPRETATION    Findings: The background was continuous, spontaneously variable and reactive. During wakefulness, the posterior dominant rhythm consisted of poorly-modulated 9 Hz activity, with amplitude to 30 uV, that attenuated to eye opening.     Background Slowing:  Background predominantly consisted of theta, delta and faster activities.    Focal Slowing:   None were present.    Sleep Background:  Drowsiness was characterized by fragmentation, attenuation, and slowing of the background activity.    Sleep was characterized by the presence of  symmetric sleep spindles and K-complexes.    Other Non-Epileptiform Findings:  None were present.    Interictal Epileptiform Activity:   None were present.    Events:  Clinical events: None recorded.  Seizures: None recorded.    Activation Procedures:   Hyperventilation was not performed.    Photic stimulation was not performed.     Artifacts:  Intermittent myogenic and movement artifacts were noted.    ECG:  The heart rate on single channel ECG was predominantly between 70-80 BPM.    _____________________________________________________________  EEG SUMMARY/CLASSIFICATION    Abnormal EEG in the awake, drowsy and asleep states.  - Mild to moderate generalized slowing.    _____________________________________________________________  EEG IMPRESSION/CLINICAL CORRELATE    Abnormal EEG study.  1. Mild to moderate nonspecific diffuse or multifocal cerebral dysfunction.   2. No epileptiform pattern or seizure seen.    Today's study demonstrated no significant change from prior 24hr recording.     _____________________________________________________________    Nadia Moyer MD  Attending Physician, [facility]

## 2020-01-05 NOTE — PROGRESS NOTE ADULT - SUBJECTIVE AND OBJECTIVE BOX
EEG: no seizures    Awakens to voice, oriented fully with delay, hypophonic, follows intermittently, antigravity in all limbs

## 2020-01-05 NOTE — PROGRESS NOTE ADULT - ASSESSMENT
ESRD, on HD. Last HD was yesterday. On schedule for tomorrow. BP is in the low 100's and he appears euvolemic.    Sepsis, possible UTI.    CVA, possible SAH also. Followed by Neurosurgery.    Anemia of ESRD.    Hyponatremia, Clinically euvolemic.    PLAN:  1.	HD tomorrow  2.	EPO at HD.  3.	Continue Antibiotics.   4.	No treatment for Hyponatremia for now as Dialysis with correct the Na and volume.

## 2020-01-05 NOTE — PROGRESS NOTE ADULT - ASSESSMENT
ASSESSMENT/PLAN: 66 y/o M with AMS in the setting of possible SAH. vs contrast uptake in previous infarcted tissue; prior seizures   Encephalopathy secondary to UTI/ hyponatremia    NEURO:  F/U EEG results  Q1 neurochecks  CT head for stability 1/4/2020 - improved " areas of hyperintensity in areas of old infarct.  Start - Plavix and eloquis 5 mg  q12  KCentra given   Continue AEDs (keppra/vimpat)  Stroke neurology following re: L basal ganglia stroke.   MRI reviewed- watershed infarcts on prior study  DC melatonin for now  Activity: [] mobilize as tolerated [x ] Bedrest [] PT [] OT [] PMNR    PULM:  Pul toilet   Please Suction  CXR  CTA chest 12/24- neg for PNA    CV: CAD s/p stent (placed 6 months ago)  HIstory of Afib on Eliquis-   See above  SBP goal <140 in setting of possible SAH  ECHO EF 60-65%  Troponin 1153.   Check Trop I to compare from prior measurements Repeat ECHO ordered      RENAL: Hyponatremia   2% at 50 cc/hr   Fluids: ESRD on HD  No anuric. Does make urine (600cc today)    GI:  Diet: PEG feeds  GI prophylaxis [] not indicated [x] PPI [] other:  Bowel regimen [] colace [x] senna [] other: miralax    ENDO:   Goal euglycemia (-180)  Insulin gtt    HEME/ONC:  Hb stable  VTE prophylaxis: [x] SCDs ; On eloquis   [x] high risk of DVT/PE on admission due to: stroke, immobility  LE dopplers 12/20: No DVT    ID:   Leukocytosis- rule out sepsis.  UTI- meropenem 500mg q day x7 days  CT C/A/P 12/24 wnl   UA negative, Blood cultures thus far NGTD  F/U cultures      SOCIAL/FAMILY:  [] awaiting [x] updated at bedside [] family meeting    CODE STATUS:  [x] Full Code [] DNR [] DNI [] Palliative/Comfort Care      DISPOSITION:  [x] ICU [] Stroke Unit [] Floor [] EMU [] RCU [] PCU    [x] Patient is at high risk of neurologic deterioration/death due to: hemorrhage, seizures      Time spent: 45 critical care minutes    Contact: [phone] ASSESSMENT/PLAN: 66 y/o M with AMS in the setting of possible SAH. vs contrast uptake in previous infarcted tissue; prior seizures   Encephalopathy secondary to UTI/ hyponatremia    NEURO:  F/U EEG results  Q1 neurochecks  CT head for stability 1/4/2020 - improved " areas of hyperintensity in areas of old infarct.  Start - Plavix and eloquis 5 mg  q12  KCentra given  Ct - improved resolution of blood on CT   Continue AEDs (keppra/vimpat)  Stroke neurology following re: L basal ganglia stroke.   MRI reviewed- watershed infarcts on prior study  DC melatonin for now  Activity: [] mobilize as tolerated [x ] Bedrest [] PT [] OT [] PMNR    PULM: Persistant cough  Pul toilet   Please Suction  CXR  CTA chest 12/24- neg for PNA    CV: CAD s/p stent (placed 6 months ago)  Resumed eloquis and Plavix   Corrected QTc with LBBB- normal  See above  SBP goal <140 in setting of possible SAH  ECHO EF 60-65%  Troponin 1153.   Check Trop I to compare from prior measurements Repeat ECHO ordered      RENAL: Hyponatremia   Off 2 %  Fluids: ESRD on HD  No anuric. Does make urine (600cc today)    GI:  Diet: PEG feeds  GI prophylaxis [] not indicated [x] PPI [] other:  Bowel regimen [] colace [x] senna [] other: miralax    ENDO:   Goal euglycemia (-180)  Insulin gtt    HEME/ONC:  Hb stable  VTE prophylaxis: [x] SCDs ; On eloquis   [x] high risk of DVT/PE on admission due to: stroke, immobility  LE dopplers 12/20: No DVT    ID: Fungal Pharangitis  Fluconazole 100mg qday  UTI- meropenem 500mg q day x7 days  CT C/A/P 12/24 wnl   UA negative, Blood cultures thus far NGTD  F/U cultures      SOCIAL/FAMILY:   [x] updated at bedside [] family meeting    CODE STATUS:  [x] Full Code [] DNR [] DNI [] Palliative/Comfort Care      DISPOSITION:  [x] ICU [] Stroke Unit [] Floor [] EMU [] RCU [] PCU    [x] Patient is at high risk of neurologic deterioration/death due to: hemorrhage, seizures      Time spent: 45 critical care minutes    Contact: [phone]

## 2020-01-05 NOTE — PROGRESS NOTE ADULT - SUBJECTIVE AND OBJECTIVE BOX
CHAD DUNBAR  64y  Patient is a 64y old  Male who presents with a chief complaint of Seizures (2020 10:06)    HPI:  This is a patient with ESRD who was admitted for SAH.  Last HD was yesterday without problems.      HEALTH ISSUES - PROBLEM Dx:  Troponin level elevated: Troponin level elevated  Pulmonary edema: Pulmonary edema  Encephalopathy: Encephalopathy  Discharge planning issues: Discharge planning issues  Prophylactic measure: Prophylactic measure  Septic shock due to undetermined organism: Septic shock due to undetermined organism  Sepsis without acute organ dysfunction, due to unspecified organism: Sepsis without acute organ dysfunction, due to unspecified organism  Type 2 diabetes mellitus with other circulatory complication, with long-term current use of insulin: Type 2 diabetes mellitus with other circulatory complication, with long-term current use of insulin  Atrial fibrillation, unspecified type: Atrial fibrillation, unspecified type  Coronary artery disease, angina presence unspecified, unspecified vessel or lesion type, unspecified whether native or transplanted heart: Coronary artery disease, angina presence unspecified, unspecified vessel or lesion type, unspecified whether native or transplanted heart  ESRD (end stage renal disease): ESRD (end stage renal disease)  Cerebrovascular accident (CVA) due to embolism of cerebral artery: Cerebrovascular accident (CVA) due to embolism of cerebral artery  Status epilepticus: Status epilepticus  Leukocytosis, unspecified type: Leukocytosis, unspecified type  Anemia, unspecified type: Anemia, unspecified type  ACP (advance care planning): ACP (advance care planning)  CVA (cerebral vascular accident): CVA (cerebral vascular accident)  Anemia: Anemia  Leukocytosis: Leukocytosis  Dysphagia: Dysphagia  Cough: Cough        MEDICATIONS  (STANDING):  albuterol/ipratropium for Nebulization 3 milliLiter(s) Nebulizer every 6 hours  apixaban 5 milliGRAM(s) Oral two times a day  atorvastatin 80 milliGRAM(s) Oral at bedtime  budesonide  80 MICROgram(s)/formoterol 4.5 MICROgram(s) Inhaler 2 Puff(s) Inhalation two times a day  calcium acetate 667 milliGRAM(s) Oral three times a day with meals  chlorhexidine 4% Liquid 1 Application(s) Topical <User Schedule>  clopidogrel Tablet 75 milliGRAM(s) Oral daily  dextrose 5%. 1000 milliLiter(s) (50 mL/Hr) IV Continuous <Continuous>  dextrose 50% Injectable 12.5 Gram(s) IV Push once  dextrose 50% Injectable 25 Gram(s) IV Push once  dextrose 50% Injectable 25 Gram(s) IV Push once  epoetin cortney Injectable 28120 Unit(s) IV Push <User Schedule>  famotidine   Suspension 20 milliGRAM(s) Oral every 48 hours  fluconAZOLE   40 mG/mL Suspension 100 milliGRAM(s) Enteral Tube daily  insulin lispro (HumaLOG) corrective regimen sliding scale   SubCutaneous every 6 hours  insulin NPH human recombinant 10 Unit(s) SubCutaneous every 6 hours  lacosamide Solution 50 milliGRAM(s) Oral two times a day  lactobacillus acidophilus 1 Tablet(s) Oral daily  levETIRAcetam  Solution 250 milliGRAM(s) Oral two times a day  levETIRAcetam  Solution 125 milliGRAM(s) Oral daily  levothyroxine 50 MICROGram(s) Oral daily  meropenem  IVPB 500 milliGRAM(s) IV Intermittent every 24 hours  midodrine. 10 milliGRAM(s) Oral every 8 hours    MEDICATIONS  (PRN):  codeine 15 milliGRAM(s) Enteral Tube every 4 hours PRN cough  dextrose 40% Gel 15 Gram(s) Oral once PRN Blood Glucose LESS THAN 70 milliGRAM(s)/deciLiter  glucagon  Injectable 1 milliGRAM(s) IntraMuscular once PRN Glucose <70 milliGRAM(s)/deciLiter    Vital Signs Last 24 Hrs  T(C): 36.6 (2020 11:00), Max: 37 (2020 23:00)  T(F): 97.9 (2020 11:00), Max: 98.6 (2020 23:00)  HR: 88 (2020 11:21) (75 - 99)  BP: 137/65 (2020 11:00) (115/51 - 147/70)  BP(mean): 74 (2020 11:00) (70 - 94)  RR: 21 (2020 11:00) (11 - 28)  SpO2: 97% (2020 11:21) (94% - 100%)  Daily     Daily Weight in k (2020 15:25)    PHYSICAL EXAM:  Constitutional:  He appears comfortable and not distressed. Not diaphoretic.    Neck:  The thyroid is normal. Trachea is midline.     Breasts: Normal examination.    Respiratory: The lungs are clear to auscultation. No dullness and expansion is normal.    Cardiovascular: S1 and S2 are normal. No mummurs, rubs or gallops are present.    Gastrointestinal: The abdomen is soft. No tenderness is present. No masses are present. Bowel sounds are normal.    Genitourinary: The bladder is not distended. No CVA tenderness is present.    Extremities: No edema is noted. No deformities are present.    Neurological: Sedated.    Skin: No lesions are seen  or palpated.                            8.9    20.01 )-----------( 377      ( 2020 22:51 )             28.1     01-04    136  |  97  |  33<H>  ----------------------------<  211<H>  4.1   |  22  |  4.41<H>    Ca    9.4      2020 22:51  Phos  2.8     01-04  Mg     2.2     01-04    TPro  6.7  /  Alb  2.6<L>  /  TBili  0.3  /  DBili  x   /  AST  18  /  ALT  10  /  AlkPhos  78  -03

## 2020-01-05 NOTE — PROGRESS NOTE ADULT - ASSESSMENT
65 yo male with ESRD, HD, PAF, admitted with multiple CVA's.  Difficulty with pulmonary toilet and airway secretions, but clear CXR and CT scan.  S/P PEG  Respiratory status stable  Sptm with Enterobacter and MSSA- viewed as colonizers  Developed fever, dramatic rise in WBC  Vanco x 1  Zosyn started, now Meropenem  Repeat CT of C/A/P on 1/2 no source of infection.  Urine Cx >100k GNRs- UTI appears to be only possible link- still awaiting sensi data  Afebrile, WBC lower, more alert- clinically improved    Suggest:  Continue Meropenem  Follow temps and CBC/diff   Await sensi's of urine isolate  D/w family at bedside

## 2020-01-05 NOTE — PROGRESS NOTE ADULT - ASSESSMENT
Seizures, stroke  - -160mmHg  - Continue anticoagulation and antiplatelets  - HD per Renal  - Abx for UTI  - Antifungal for thrush  - Seizure: continue antiepileptics

## 2020-01-05 NOTE — PROGRESS NOTE ADULT - SUBJECTIVE AND OBJECTIVE BOX
CC: f/u for fever    Patient remains in NSCU, more alert, responsive, no distress    REVIEW OF SYSTEMS:  limited by underlying condition.    Antimicrobials Day # 4, Meropenem  Medications Reviewed    Vital Signs Last 24 Hrs  T(F): 98.6 (2020 15:00), Max: 98.6 (2020 23:00)  HR: 82 (2020 15:00) (75 - 99)  BP: 141/72 (2020 15:00) (115/56 - 147/70)  BP(mean): 93 (2020 15:00) (72 - 105)  RR: 18 (2020 15:00) (14 - 28)  SpO2: 100% (2020 15:00) (94% - 100%)    PHYSICAL EXAM:  General: no acute distress  Eyes:  anicteric, no conjunctival injection, no discharge  Oropharynx: no lesions or injection 	  Neck: supple, without adenopathy  R HD cath site clean  Lungs: ant rhonchi, diminished at bases  Heart: regular rate and rhythm; no murmur, rubs or gallops  Abdomen: soft, distended, nontender, G tube site clean  Skin: no lesions  Extremities: no edema  Neurologic: more alert, interactive    LAB RESULTS:                        8.9    20.01 )-----------( 377      ( 2020 22:51 )             28.1   01-04    136  |  97  |  33<H>  ----------------------------<  211<H>  4.1   |  22  |  4.41<H>    Ca    9.4      2020 22:51  Phos  2.8     01-04  Mg     2.2     01-04            Urinalysis Basic - ( 2020 20:34 )    Color: Light Yellow / Appearance: Slightly Turbid / S.012 / pH: x  Gluc: x / Ketone: Negative  / Bili: Negative / Urobili: Negative   Blood: x / Protein: 300 mg/dL / Nitrite: Negative   Leuk Esterase: Moderate / RBC: 1 /hpf / WBC 46 /HPF   Sq Epi: x / Non Sq Epi: 0 /hpf / Bacteria: Many    MICROBIOLOGY:  RECENT CULTURES:  Culture - Sputum . (20 @ 22:37)    Gram Stain:   Few polymorphonuclear leukocytes per low power field  Moderate Squamous epithelial cells per low power field  Rare Gram Variable Cocci seen per oil power field  Few Gram Variable Rods seen per oil power field    Specimen Source: .Sputum Sputum    Culture Results:   Normal Respiratory Rachel present     @ 04:04 .Blood Blood-Peripheral     No growth to date.    Culture - Urine (20 @ 23:06)    Specimen Source: .Urine Clean Catch (Midstream)    Culture Results:   >100,000 CFU/ml Gram Negative Rods      RADIOLOGY REVIEWED:  Xray Chest 1 View- PORTABLE-Urgent (20 @ 11:48) >  Since 20 exam, new, mild pulmonary vascular congestive changes have developed.    CT Abdomen and Pelvis w/ Oral Cont and w/ IV Cont (20 @ 16:50) >  No bowel obstruction.  No CT evidence of acute intra-abdominal or intrathoracic pathology.

## 2020-01-05 NOTE — PROGRESS NOTE ADULT - SUBJECTIVE AND OBJECTIVE BOX
HPI:  Patient is a 64 year old male with a history of CAD s/p stents 2009 and 2019, HTN, DM, Carotid stenosis, Hypothyroidism, and ESRD on HD who presented to outside hospital with altered mental status as he was unresponsive, aphasic, and not moving any extremities.  Was treated with tPA and admitted for observation.  Patient noted to have seizure during hospital course and was started on Keppra.  MRI brain was done which showed bilateral supratentorial and infratentorial infarcts.  Patient was undergoing dialysis when he developed A. fib and was intubated for airway protection.  Neurology following and multiple EEGs were done which showed patient in status epilepticus despite multiple antiepilectics.  Patient transferred to [facility] for further management. (19 Dec 2019 16:37)  Was initially in NSCU with status epilepticus. Improved. Extubated and [facility]   Recently being worked up for sepsis.   Rapid response called on 1/3/2020 for altered mental status and hypotension.       Overnight events:  Diarrhea - soft and cough - responds to codeine    ICU Vital Signs Last 24 Hrs  T(C): 36.6 (05 Jan 2020 07:00), Max: 37 (04 Jan 2020 23:00)  T(F): 97.9 (05 Jan 2020 07:00), Max: 98.6 (04 Jan 2020 23:00)  HR: 83 (05 Jan 2020 08:00) (76 - 99)  BP: 124/54 (05 Jan 2020 08:00) (98/46 - 147/70)  BP(mean): 75 (05 Jan 2020 08:00) (63 - 94)  RR: 19 (05 Jan 2020 08:00) (11 - 28)  SpO2: 96% (05 Jan 2020 08:00) (94% - 100%)          04 Jan 2020 07:01  -  05 Jan 2020 07:00  --------------------------------------------------------  IN:    Enteral Tube Flush: 560 mL    IV PiggyBack: 50 mL    Nepro with Carb Steady: 600 mL    Packed Red Blood Cells: 350 mL    sodium chloride 0.9%: 150 mL    sodium chloride 2% .: 1100 mL  Total IN: 2810 mL    OUT:    Other: 1000 mL  Total OUT: 1000 mL    Total NET: 1810 mL      05 Jan 2020 07:01  -  05 Jan 2020 10:09  --------------------------------------------------------  IN:    sodium chloride 2% .: 50 mL  Total IN: 50 mL    OUT:  Total OUT: 0 mL    Total NET: 50 mL              acetaminophen    Suspension .. 650 milliGRAM(s) Oral every 4 hours PRN  albuterol/ipratropium for Nebulization 3 milliLiter(s) Nebulizer every 6 hours  atorvastatin 80 milliGRAM(s) Oral at bedtime  calcium acetate 667 milliGRAM(s) Oral three times a day with meals  chlorhexidine 4% Liquid 1 Application(s) Topical <User Schedule>  dextrose 40% Gel 15 Gram(s) Oral once PRN  dextrose 5%. 1000 milliLiter(s) (50 mL/Hr) IV Continuous <Continuous>  dextrose 50% Injectable 12.5 Gram(s) IV Push once  dextrose 50% Injectable 25 Gram(s) IV Push once  dextrose 50% Injectable 25 Gram(s) IV Push once  glucagon  Injectable 1 milliGRAM(s) IntraMuscular once PRN  guaiFENesin   Syrup  (Sugar-Free) 200 milliGRAM(s) Oral every 6 hours PRN  insulin lispro (HumaLOG) corrective regimen sliding scale   SubCutaneous every 6 hours  lacosamide Solution 50 milliGRAM(s) Oral two times a day  levETIRAcetam  Solution 250 milliGRAM(s) Oral two times a day  levETIRAcetam  Solution 125 milliGRAM(s) Oral daily  levothyroxine 50 MICROGram(s) Oral daily  meropenem  IVPB 1000 milliGRAM(s) IV Intermittent every 24 hours  metoclopramide Injectable 10 milliGRAM(s) IV Push every 8 hours PRN  midodrine. 10 milliGRAM(s) Oral every 8 hours  pantoprazole   Suspension 40 milliGRAM(s) Oral two times a day  sodium chloride 0.9%. 1000 milliLiter(s) (50 mL/Hr) IV Continuous <Continuous>                              8.9    20.01 )-----------( 377      ( 04 Jan 2020 22:51 )             28.1                       7.9    26.85 )-----------( 324      ( 03 Jan 2020 23:04 )             26.9     01-04    136  |  97  |  33<H>  ----------------------------<  211<H>  4.1   |  22  |  4.41<H>    Ca    9.4      04 Jan 2020 22:51  Phos  2.8     01-04  Mg     2.2     01-04    TPro  6.7  /  Alb  2.6<L>  /  TBili  0.3  /  DBili  x   /  AST  18  /  ALT  10  /  AlkPhos  78  01-03    01-04    126<L>  |  88<L>  |  48<H>  ----------------------------<  162<H>  3.9   |  19<L>  |  6.62<H>    Ca    8.9      04 Jan 2020 01:54  Phos  4.4     01-04  Mg     2.2     01-04    TPro  6.7  /  Alb  2.6<L>  /  TBili  0.3  /  DBili  x   /  AST  18  /  ALT  10  /  AlkPhos  78  01-03    LIVER FUNCTIONS - ( 03 Jan 2020 16:21 )  Alb: 2.6 g/dL / Pro: 6.7 g/dL / ALK PHOS: 78 U/L / ALT: 10 U/L / AST: 18 U/L / GGT: x           ABG - ( 03 Jan 2020 15:57 )  pH, Arterial: 7.44  pH, Blood: x     /  pCO2: 36    /  pO2: 134   / HCO3: 24    / Base Excess: .9    /  SaO2: 99        CT Head No Cont (01.05.20 @ 08:51) >  IMPRESSION:    Unchanged volume loss, redemonstration multifocal evolving ischemic change less pronounced foci of increased attenuation likely resolving contrast staining,  petechial and subarachnoid hemorrhage not excluded. No new large hemorrhage or midline shift.      Culture - Sputum (collected 03 Jan 2020 22:37)  Source: .Sputum Sputum  Gram Stain (04 Jan 2020 06:08):    Few polymorphonuclear leukocytes per low power field    Moderate Squamous epithelial cells per low power field    Rare Gram Variable Cocci seen per oil power field    Few Gram Variable Rods seen per oil power field    Culture - Urine (collected 02 Jan 2020 23:06)  Source: .Urine Clean Catch (Midstream)  Preliminary Report (03 Jan 2020 20:41):    >100,000 CFU/ml Gram Negative Rods    Culture - Blood (collected 02 Jan 2020 04:04)  Source: .Blood Blood-Peripheral  Preliminary Report (03 Jan 2020 05:01):    No growth to date.    Culture - Blood (collected 02 Jan 2020 00:54)  Source: .Blood Blood-Peripheral  Preliminary Report (03 Jan 2020 01:01):    No growth to date.      UA - WBC - 46           LE- Pos                                    7.2    31.10 )-----------( 365      ( 03 Jan 2020 16:21 )             22.7     01-03    123<L>  |  86<L>  |  45<H>  ----------------------------<  286<H>  3.5   |  21<L>  |  5.99<H>    Ca    8.5      03 Jan 2020 16:21  Phos  4.2     01-03  Mg     2.2     01-03    CAPILLARY BLOOD GLUCOSE    CAPILLARY BLOOD GLUCOSE      POCT Blood Glucose.: 96 mg/dL (05 Jan 2020 04:20)  POCT Blood Glucose.: 191 mg/dL (04 Jan 2020 23:12)  POCT Blood Glucose.: 150 mg/dL (04 Jan 2020 17:32)  POCT Blood Glucose.: 230 mg/dL (04 Jan 2020 12:38)      TPro  6.7  /  Alb  2.6<L>  /  TBili  0.3  /  DBili  x   /  AST  18  /  ALT  10  /  AlkPhos  78  01-03    LIVER FUNCTIONS - ( 03 Jan 2020 16:21 )  Alb: 2.6 g/dL / Pro: 6.7 g/dL / ALK PHOS: 78 U/L / ALT: 10 U/L / AST: 18 U/L / GGT: x           ABG - ( 03 Jan 2020 15:57 )  pH, Arterial: 7.44  pH, Blood: x     /  pCO2: 36    /  pO2: 134   / HCO3: 24    / Base Excess: .9    /  SaO2: 99            EXAMINATION:  General:  calm  HEENT:  MM slightly dry  Neuro: alert and arousable, oriented x 3,elevates B/L UE R>L yet weak proximally, . B/L LE AG movements   Cards:  RRR  Respiratory:  no respiratory distress  Abomen:  soft; non tender pos BS  Extremities:  no edema  Skin:  warm/dry. No erythema. Permacath site C/D/I

## 2020-01-05 NOTE — PROGRESS NOTE ADULT - SUBJECTIVE AND OBJECTIVE BOX
INTERVAL HPI/OVERNIGHT EVENTS:  No new overnight event.  No N/V/D.  Tolerating diet via peg    Allergies    No Known Allergies    Intolerances    General:  No wt loss, fevers, chills, night sweats, fatigue,   Eyes:  Good vision, no reported pain  ENT:  No sore throat, pain, runny nose, dysphagia  CV:  No pain, palpitations, hypo/hypertension  Resp:  No dyspnea, cough, tachypnea, wheezing  GI:  No pain, No nausea, No vomiting, No diarrhea, No constipation, No weight loss, No fever, No pruritis, No rectal bleeding, No tarry stools, No dysphagia,  :  No pain, bleeding, incontinence, nocturia  Muscle:  No pain, weakness  Neuro:  No weakness, tingling, memory problems  Psych:  No fatigue, insomnia, mood problems, depression  Endocrine:  No polyuria, polydipsia, cold/heat intolerance  Heme:  No petechiae, ecchymosis, easy bruisability  Skin:  No rash, tattoos, scars, edema      PHYSICAL EXAM:   Vital Signs:  Vital Signs Last 24 Hrs  T(C): 37 (05 Jan 2020 15:00), Max: 37 (04 Jan 2020 23:00)  T(F): 98.6 (05 Jan 2020 15:00), Max: 98.6 (04 Jan 2020 23:00)  HR: 82 (05 Jan 2020 15:00) (75 - 99)  BP: 141/72 (05 Jan 2020 15:00) (115/56 - 147/70)  BP(mean): 93 (05 Jan 2020 15:00) (71 - 105)  RR: 18 (05 Jan 2020 15:00) (14 - 28)  SpO2: 100% (05 Jan 2020 15:00) (94% - 100%)  Daily     Daily I&O's Summary    04 Jan 2020 07:01  -  05 Jan 2020 07:00  --------------------------------------------------------  IN: 2810 mL / OUT: 1000 mL / NET: 1810 mL    05 Jan 2020 07:01  -  05 Jan 2020 16:23  --------------------------------------------------------  IN: 680 mL / OUT: 0 mL / NET: 680 mL        GENERAL:  Appears stated age, well-groomed, well-nourished, no distress  HEENT:  NC/AT,  conjunctivae clear and pink, no thyromegaly, nodules, adenopathy, no JVD, sclera -anicteric  CHEST:  Full & symmetric excursion, no increased effort, breath sounds clear  HEART:  Regular rhythm, S1, S2, no murmur/rub/S3/S4, no abdominal bruit, no edema  ABDOMEN:  Soft, non-tender, non-distended, normoactive bowel sounds,  no masses ,no hepato-splenomegaly, no signs of chronic liver disease  EXTEREMITIES:  no cyanosis,clubbing or edema  SKIN:  No rash/erythema/ecchymoses/petechiae/wounds/abscess/warm/dry  NEURO:  Alert, oriented, no asterixis, no tremor, no encephalopathy      LABS:                        8.9    20.01 )-----------( 377      ( 04 Jan 2020 22:51 )             28.1     01-04    136  |  97  |  33<H>  ----------------------------<  211<H>  4.1   |  22  |  4.41<H>    Ca    9.4      04 Jan 2020 22:51  Phos  2.8     01-04  Mg     2.2     01-04      PT/INR - ( 03 Jan 2020 23:04 )   PT: 19.0 sec;   INR: 1.64 ratio         PTT - ( 03 Jan 2020 23:04 )  PTT:34.3 sec    amylase   lipase  RADIOLOGY & ADDITIONAL TESTS:

## 2020-01-06 DIAGNOSIS — N39.0 URINARY TRACT INFECTION, SITE NOT SPECIFIED: ICD-10-CM

## 2020-01-06 LAB
-  IMIPENEM: SIGNIFICANT CHANGE UP
-  PIPERACILLIN/TAZOBACTAM: SIGNIFICANT CHANGE UP
ANION GAP SERPL CALC-SCNC: 15 MMOL/L — SIGNIFICANT CHANGE UP (ref 5–17)
BUN SERPL-MCNC: 58 MG/DL — HIGH (ref 7–23)
CALCIUM SERPL-MCNC: 9.3 MG/DL — SIGNIFICANT CHANGE UP (ref 8.4–10.5)
CHLORIDE SERPL-SCNC: 98 MMOL/L — SIGNIFICANT CHANGE UP (ref 96–108)
CO2 SERPL-SCNC: 23 MMOL/L — SIGNIFICANT CHANGE UP (ref 22–31)
CREAT SERPL-MCNC: 7.36 MG/DL — HIGH (ref 0.5–1.3)
CULTURE RESULTS: SIGNIFICANT CHANGE UP
GLUCOSE BLDC GLUCOMTR-MCNC: 110 MG/DL — HIGH (ref 70–99)
GLUCOSE BLDC GLUCOMTR-MCNC: 137 MG/DL — HIGH (ref 70–99)
GLUCOSE BLDC GLUCOMTR-MCNC: 162 MG/DL — HIGH (ref 70–99)
GLUCOSE BLDC GLUCOMTR-MCNC: 98 MG/DL — SIGNIFICANT CHANGE UP (ref 70–99)
GLUCOSE SERPL-MCNC: 142 MG/DL — HIGH (ref 70–99)
HCT VFR BLD CALC: 26.6 % — LOW (ref 39–50)
HGB BLD-MCNC: 8.2 G/DL — LOW (ref 13–17)
MAGNESIUM SERPL-MCNC: 2.5 MG/DL — SIGNIFICANT CHANGE UP (ref 1.6–2.6)
MCHC RBC-ENTMCNC: 29.1 PG — SIGNIFICANT CHANGE UP (ref 27–34)
MCHC RBC-ENTMCNC: 30.8 GM/DL — LOW (ref 32–36)
MCV RBC AUTO: 94.3 FL — SIGNIFICANT CHANGE UP (ref 80–100)
METHOD TYPE: SIGNIFICANT CHANGE UP
NRBC # BLD: 0 /100 WBCS — SIGNIFICANT CHANGE UP (ref 0–0)
ORGANISM # SPEC MICROSCOPIC CNT: SIGNIFICANT CHANGE UP
PHOSPHATE SERPL-MCNC: 3.1 MG/DL — SIGNIFICANT CHANGE UP (ref 2.5–4.5)
PLATELET # BLD AUTO: 319 K/UL — SIGNIFICANT CHANGE UP (ref 150–400)
POTASSIUM SERPL-MCNC: 3.7 MMOL/L — SIGNIFICANT CHANGE UP (ref 3.5–5.3)
POTASSIUM SERPL-SCNC: 3.7 MMOL/L — SIGNIFICANT CHANGE UP (ref 3.5–5.3)
RBC # BLD: 2.82 M/UL — LOW (ref 4.2–5.8)
RBC # FLD: 15.6 % — HIGH (ref 10.3–14.5)
SODIUM SERPL-SCNC: 136 MMOL/L — SIGNIFICANT CHANGE UP (ref 135–145)
SPECIMEN SOURCE: SIGNIFICANT CHANGE UP
WBC # BLD: 17.39 K/UL — HIGH (ref 3.8–10.5)
WBC # FLD AUTO: 17.39 K/UL — HIGH (ref 3.8–10.5)

## 2020-01-06 PROCEDURE — 99232 SBSQ HOSP IP/OBS MODERATE 35: CPT

## 2020-01-06 RX ORDER — MIDODRINE HYDROCHLORIDE 2.5 MG/1
5 TABLET ORAL EVERY 8 HOURS
Refills: 0 | Status: DISCONTINUED | OUTPATIENT
Start: 2020-01-06 | End: 2020-01-06

## 2020-01-06 RX ORDER — MIDODRINE HYDROCHLORIDE 2.5 MG/1
10 TABLET ORAL EVERY 8 HOURS
Refills: 0 | Status: DISCONTINUED | OUTPATIENT
Start: 2020-01-06 | End: 2020-01-07

## 2020-01-06 RX ADMIN — HUMAN INSULIN 10 UNIT(S): 100 INJECTION, SUSPENSION SUBCUTANEOUS at 05:38

## 2020-01-06 RX ADMIN — HUMAN INSULIN 10 UNIT(S): 100 INJECTION, SUSPENSION SUBCUTANEOUS at 18:02

## 2020-01-06 RX ADMIN — BUDESONIDE AND FORMOTEROL FUMARATE DIHYDRATE 2 PUFF(S): 160; 4.5 AEROSOL RESPIRATORY (INHALATION) at 05:39

## 2020-01-06 RX ADMIN — Medication 3 MILLILITER(S): at 17:15

## 2020-01-06 RX ADMIN — FLUCONAZOLE 100 MILLIGRAM(S): 150 TABLET ORAL at 17:19

## 2020-01-06 RX ADMIN — MEROPENEM 100 MILLIGRAM(S): 1 INJECTION INTRAVENOUS at 18:32

## 2020-01-06 RX ADMIN — Medication 667 MILLIGRAM(S): at 16:22

## 2020-01-06 RX ADMIN — Medication 3 MILLILITER(S): at 23:17

## 2020-01-06 RX ADMIN — MIDODRINE HYDROCHLORIDE 10 MILLIGRAM(S): 2.5 TABLET ORAL at 05:39

## 2020-01-06 RX ADMIN — CODEINE SULFATE 15 MILLIGRAM(S): 60 TABLET ORAL at 05:40

## 2020-01-06 RX ADMIN — HUMAN INSULIN 10 UNIT(S): 100 INJECTION, SUSPENSION SUBCUTANEOUS at 23:54

## 2020-01-06 RX ADMIN — CLOPIDOGREL BISULFATE 75 MILLIGRAM(S): 75 TABLET, FILM COATED ORAL at 12:51

## 2020-01-06 RX ADMIN — Medication 3 MILLILITER(S): at 05:39

## 2020-01-06 RX ADMIN — LEVETIRACETAM 250 MILLIGRAM(S): 250 TABLET, FILM COATED ORAL at 17:17

## 2020-01-06 RX ADMIN — Medication 3 MILLILITER(S): at 12:51

## 2020-01-06 RX ADMIN — LACOSAMIDE 50 MILLIGRAM(S): 50 TABLET ORAL at 17:18

## 2020-01-06 RX ADMIN — Medication 2: at 18:02

## 2020-01-06 RX ADMIN — APIXABAN 5 MILLIGRAM(S): 2.5 TABLET, FILM COATED ORAL at 05:39

## 2020-01-06 RX ADMIN — Medication 50 MICROGRAM(S): at 05:39

## 2020-01-06 RX ADMIN — MIDODRINE HYDROCHLORIDE 5 MILLIGRAM(S): 2.5 TABLET ORAL at 17:11

## 2020-01-06 RX ADMIN — Medication 667 MILLIGRAM(S): at 05:39

## 2020-01-06 RX ADMIN — ATORVASTATIN CALCIUM 80 MILLIGRAM(S): 80 TABLET, FILM COATED ORAL at 23:18

## 2020-01-06 RX ADMIN — APIXABAN 5 MILLIGRAM(S): 2.5 TABLET, FILM COATED ORAL at 17:15

## 2020-01-06 RX ADMIN — Medication 667 MILLIGRAM(S): at 23:18

## 2020-01-06 RX ADMIN — LEVETIRACETAM 250 MILLIGRAM(S): 250 TABLET, FILM COATED ORAL at 05:38

## 2020-01-06 RX ADMIN — Medication 1 TABLET(S): at 12:52

## 2020-01-06 RX ADMIN — LACOSAMIDE 50 MILLIGRAM(S): 50 TABLET ORAL at 05:39

## 2020-01-06 NOTE — PROGRESS NOTE ADULT - PROBLEM SELECTOR PLAN 3
-UC with Acinetobacter baumannii complex  -C/w Meropenem per ID  -Blood cultures thus far negative  -CT C/A/P without evidence of occult infection.

## 2020-01-06 NOTE — PROGRESS NOTE ADULT - PROBLEM SELECTOR PLAN 3
Stable on vimpat, Keppra   - continue Keppra and Vimpat per neuro for seizure treatment/prophylaxis  - regular neuro checks q 4 hours  - dose Keppra after HD on days pt receives HD per nephro

## 2020-01-06 NOTE — PROGRESS NOTE ADULT - PROBLEM SELECTOR PLAN 1
Improved  -CT chest 12/24 negative for PNA  -Sputum culture: MSSA, Enterobacter could be colonizing   -RVP negative  -Duoneb PRN

## 2020-01-06 NOTE — PROGRESS NOTE ADULT - SUBJECTIVE AND OBJECTIVE BOX
Follow-up Pulm Progress Note    Alert  Denies SOB/CP  Sats 96% RA  +congested cough    Medications:  MEDICATIONS  (STANDING):  albuterol/ipratropium for Nebulization 3 milliLiter(s) Nebulizer every 6 hours  apixaban 5 milliGRAM(s) Oral two times a day  atorvastatin 80 milliGRAM(s) Oral at bedtime  budesonide  80 MICROgram(s)/formoterol 4.5 MICROgram(s) Inhaler 2 Puff(s) Inhalation two times a day  calcium acetate 667 milliGRAM(s) Oral three times a day with meals  chlorhexidine 4% Liquid 1 Application(s) Topical <User Schedule>  clopidogrel Tablet 75 milliGRAM(s) Oral daily  dextrose 5%. 1000 milliLiter(s) (50 mL/Hr) IV Continuous <Continuous>  dextrose 50% Injectable 12.5 Gram(s) IV Push once  dextrose 50% Injectable 25 Gram(s) IV Push once  epoetin cortney Injectable 88275 Unit(s) IV Push <User Schedule>  famotidine   Suspension 20 milliGRAM(s) Oral every 48 hours  fluconAZOLE   40 mG/mL Suspension 100 milliGRAM(s) Enteral Tube daily  insulin lispro (HumaLOG) corrective regimen sliding scale   SubCutaneous every 6 hours  insulin NPH human recombinant 10 Unit(s) SubCutaneous every 6 hours  lacosamide Solution 50 milliGRAM(s) Oral two times a day  lactobacillus acidophilus 1 Tablet(s) Oral daily  levETIRAcetam  Solution 250 milliGRAM(s) Oral two times a day  levETIRAcetam  Solution 125 milliGRAM(s) Oral daily  levothyroxine 50 MICROGram(s) Oral daily  meropenem  IVPB 500 milliGRAM(s) IV Intermittent every 24 hours  midodrine 5 milliGRAM(s) Oral every 8 hours    MEDICATIONS  (PRN):  bisacodyl Suppository 10 milliGRAM(s) Rectal daily PRN Constipation  codeine 15 milliGRAM(s) Enteral Tube every 4 hours PRN cough  glucagon  Injectable 1 milliGRAM(s) IntraMuscular once PRN Glucose <70 milliGRAM(s)/deciLiter          Vital Signs Last 24 Hrs  T(C): 36.9 (06 Jan 2020 12:23), Max: 37.1 (05 Jan 2020 23:00)  T(F): 98.5 (06 Jan 2020 12:23), Max: 98.7 (05 Jan 2020 23:00)  HR: 61 (06 Jan 2020 12:23) (61 - 99)  BP: 110/62 (06 Jan 2020 12:23) (104/76 - 148/89)  BP(mean): 99 (05 Jan 2020 23:00) (81 - 107)  RR: 17 (06 Jan 2020 12:23) (16 - 28)  SpO2: 95% (06 Jan 2020 12:23) (95% - 100%)          01-05 @ 07:01  -  01-06 @ 07:00  --------------------------------------------------------  IN: 1500 mL / OUT: 0 mL / NET: 1500 mL          LABS:                        8.2    17.39 )-----------( 319      ( 06 Jan 2020 06:26 )             26.6     01-06    136  |  98  |  58<H>  ----------------------------<  142<H>  3.7   |  23  |  7.36<H>    Ca    9.3      06 Jan 2020 06:26  Phos  3.1     01-06  Mg     2.5     01-06            CAPILLARY BLOOD GLUCOSE      POCT Blood Glucose.: 98 mg/dL (06 Jan 2020 12:33)                        CULTURES:    Culture - Blood (collected 01-03-20 @ 10:32)  Source: .Blood Blood-Venous  Preliminary Report (01-04-20 @ 11:01):    No growth to date.    Culture - Blood (collected 01-03-20 @ 10:32)  Source: .Blood Blood-Peripheral  Preliminary Report (01-04-20 @ 11:01):    No growth to date.    Culture - Blood (collected 01-02-20 @ 04:04)  Source: .Blood Blood-Peripheral  Preliminary Report (01-03-20 @ 05:01):    No growth to date.    Culture - Blood (collected 01-02-20 @ 00:54)  Source: .Blood Blood-Peripheral  Preliminary Report (01-03-20 @ 01:01):    No growth to date.        Culture - Urine (collected 01-02-20 @ 23:06)  Source: .Urine Clean Catch (Midstream)  Preliminary Report (01-05-20 @ 19:27):    >100,000 CFU/ml Acinetobacter baumannii complex    Culture in progress  Organism: Acinetobacter baumannii (01-05-20 @ 19:27)  Organism: Acinetobacter baumannii (01-05-20 @ 19:27)      -  Amikacin: S <=16      -  Ampicillin/Sulbactam: S <=8/4      -  Cefepime: S <=4      -  Ceftazidime: S 4      -  Ceftriaxone: S 8      -  Ciprofloxacin: S <=1      -  Gentamicin: S <=4      -  Levofloxacin: S <=2      -  Meropenem: S <=1      -  Tobramycin: S <=4      -  Trimethoprim/Sulfamethoxazole: S <=2/38      Method Type: TALITA          Physical Examination:  PULM: few scattered rhonchi  CVS: RRR    RADIOLOGY REVIEWED  CXR: mild pulm edema

## 2020-01-06 NOTE — PROGRESS NOTE ADULT - ASSESSMENT
dysphagia  cva  anemia  fever    s/p peg  feeds as tolerated  proton pump inhibitor daily  cbc daily  ID eval noted; cont meropenem   afebrile, WBC slowly improving today   will follow     Advanced care planning was discussed with patient and family.  Advanced care planning forms were reviewed and discussed.  Risks, benefits and alternatives of gastroenterologic procedures were discussed in detail and all questions were answered.    30 minutes spent.

## 2020-01-06 NOTE — PROGRESS NOTE ADULT - PROBLEM SELECTOR PLAN 10
Transitions of Care Status:  1.  Name of PCP: Jamie Corral  2.  PCP Contacted on Admission: [x] Y    [ ] N ***Contacted 1/6/2020    3.  PCP contacted at Discharge: [ ] Y    [ ] N    [ ] N/A  4.  Post-Discharge Appointment Date and Location:  5.  Summary of Handoff given to PCP:

## 2020-01-06 NOTE — PROGRESS NOTE ADULT - PROBLEM SELECTOR PLAN 1
Based on work up thus far, likely secondary to complicated UTI  Clinically improving and leukocytosis appropriately downtrending  UCx: Acinetobacter (Pansensitive)  BCx: NGTD, RVP Negative, CT A/P unrevealing for infectious source  - Continue Meropenem, narrow as guided by ID  - ID following, will appreciate recommendations  - On Midodrine 10 mg Q8 for now, wean as tolerated. BP ranging from 108-140/50-60's, can likely rapidly wean off in next few days (will start with decreasing to q 12 today and monitor closely) Based on work up thus far, likely secondary to complicated UTI  Clinically improving and leukocytosis appropriately downtrending  UCx: Acinetobacter (Pansensitive)  BCx: NGTD, RVP Negative, CT A/P unrevealing for infectious source  - Continue Meropenem, narrow as guided by ID  - ID following, will appreciate recommendations  - On Midodrine 10 mg Q8 for now, wean as tolerated. BP ranging from 108-140/50-60's, can likely rapidly wean off in next few days (will start with decreasing to 5 mg today and monitor closely)

## 2020-01-06 NOTE — PROGRESS NOTE ADULT - SUBJECTIVE AND OBJECTIVE BOX
INTERVAL HPI/OVERNIGHT EVENTS:  overnight events noted  tube feeds were on hold this AM as connector was malfunctioning; replaced with new PEG adapter   No BMs this AM during time of evaluation   denies n/v abd pain    Allergies    No Known Allergies    Intolerances    General:  No wt loss, fevers, chills, night sweats, fatigue,   Eyes:  Good vision, no reported pain  ENT:  No sore throat, pain, runny nose, dysphagia  CV:  No pain, palpitations, hypo/hypertension  Resp:  No dyspnea, cough, tachypnea, wheezing  GI:  No pain, No nausea, No vomiting, No diarrhea, No constipation, No weight loss, No fever, No pruritis, No rectal bleeding, No tarry stools, No dysphagia,  :  No pain, bleeding, incontinence, nocturia  Muscle:  No pain, weakness  Neuro:  No weakness, tingling, memory problems  Psych:  No fatigue, insomnia, mood problems, depression  Endocrine:  No polyuria, polydipsia, cold/heat intolerance  Heme:  No petechiae, ecchymosis, easy bruisability  Skin:  No rash, tattoos, scars, edema      PHYSICAL EXAM:   Vital Signs:  Vital Signs Last 24 Hrs  T(C): 37 (05 Jan 2020 15:00), Max: 37 (04 Jan 2020 23:00)  T(F): 98.6 (05 Jan 2020 15:00), Max: 98.6 (04 Jan 2020 23:00)  HR: 82 (05 Jan 2020 15:00) (75 - 99)  BP: 141/72 (05 Jan 2020 15:00) (115/56 - 147/70)  BP(mean): 93 (05 Jan 2020 15:00) (71 - 105)  RR: 18 (05 Jan 2020 15:00) (14 - 28)  SpO2: 100% (05 Jan 2020 15:00) (94% - 100%)  Daily     Daily I&O's Summary    04 Jan 2020 07:01  -  05 Jan 2020 07:00  --------------------------------------------------------  IN: 2810 mL / OUT: 1000 mL / NET: 1810 mL    05 Jan 2020 07:01  -  05 Jan 2020 16:23  --------------------------------------------------------  IN: 680 mL / OUT: 0 mL / NET: 680 mL        GENERAL:  NAD   HEENT:  NC/AT,  conjunctivae clear and pink, no thyromegaly, nodules, adenopathy, no JVD, sclera -anicteric  ABDOMEN:  Soft, non-tender, +distended, normoactive bowel sounds, +PEG c/d/i  EXTEREMITIES:  no cyanosis,clubbing or edema  SKIN:  No rash/erythema/ecchymoses/petechiae/wounds/abscess/warm/dry  NEURO:  Alert, responsive       LABS:                        8.9    20.01 )-----------( 377      ( 04 Jan 2020 22:51 )             28.1     01-04    136  |  97  |  33<H>  ----------------------------<  211<H>  4.1   |  22  |  4.41<H>    Ca    9.4      04 Jan 2020 22:51  Phos  2.8     01-04  Mg     2.2     01-04      PT/INR - ( 03 Jan 2020 23:04 )   PT: 19.0 sec;   INR: 1.64 ratio         PTT - ( 03 Jan 2020 23:04 )  PTT:34.3 sec    amylase   lipase  RADIOLOGY & ADDITIONAL TESTS:

## 2020-01-06 NOTE — PROGRESS NOTE ADULT - PROBLEM SELECTOR PLAN 5
CHADVASCs  score 4  - Holding coreg in setting of hypotension, as above will add back once clinically stable from shock perspective, likely 24-48 hours  - Eliquis for AC

## 2020-01-06 NOTE — PROGRESS NOTE ADULT - ASSESSMENT
63 yo male with ESRD on HD (M-W-FRI without AVF as awaiting renal transplant expected within 3 months; Dr. Barrientos; [facility]), CAD s/p stent 2009 and CARLA in June 2019, HTN, DM type 2, hypothyroidism and left carotid stenosis admitted for altered mental status during dialysis, he became unresponsive and arm not moving therefore he was sent to hospital. At [facility], he received tPA on 12/13/19. Was noted to have SAH after tPA. Started on 3% saline. Had seizure, despite multiple AEDs remained in status epilepticus and was transferred to [facility] for continuous EEG monitoring. s/p extubation, PEG tube placement. Is awake and following commands. Nephrology is following for ESRD management.    ESRD on HD TTS via tunnelled HD catheter   - HD consent in the chart  - Last HD 1/4/2020 with 1 L UF   - please dose Keppra post HD on HD days    -HD tomorrow    - needs rehab w/ onsite HD    Hyponatremia  - from increased free water intake  - Use minimum possible free water flushes  - should improve w/ HD    Anemia  - unable to use epogen due to CVA  - transfuse PRN Hgb <7 or hemodynamically significant bleeding  - no evidence of occult bleeding  - GI following     HTN  -BP improving  - Monitor closely    Fever   -etiology?  -s/p CT ABd   - PT also with permacath -- follow up ID

## 2020-01-06 NOTE — PROGRESS NOTE ADULT - SUBJECTIVE AND OBJECTIVE BOX
[facility] Division of Hospital Medicine  Alesia Jones MD  Pager (LINNETTE-XIN, 5B-4A): [phone]  Other Times:  [phone]    Patient is a 64y old  Male who presents with a chief complaint of Seizures (05 Jan 2020 22:32)      SUBJECTIVE / OVERNIGHT EVENTS: No acute events overnight. Patient denies any pain or any complaints    ADDITIONAL REVIEW OF SYSTEMS:   No chest pain, sob, N/V/D, cough.    MEDICATIONS  (STANDING):  albuterol/ipratropium for Nebulization 3 milliLiter(s) Nebulizer every 6 hours  apixaban 5 milliGRAM(s) Oral two times a day  atorvastatin 80 milliGRAM(s) Oral at bedtime  budesonide  80 MICROgram(s)/formoterol 4.5 MICROgram(s) Inhaler 2 Puff(s) Inhalation two times a day  calcium acetate 667 milliGRAM(s) Oral three times a day with meals  chlorhexidine 4% Liquid 1 Application(s) Topical <User Schedule>  clopidogrel Tablet 75 milliGRAM(s) Oral daily  dextrose 5%. 1000 milliLiter(s) (50 mL/Hr) IV Continuous <Continuous>  dextrose 50% Injectable 12.5 Gram(s) IV Push once  dextrose 50% Injectable 25 Gram(s) IV Push once  epoetin cortney Injectable 96192 Unit(s) IV Push <User Schedule>  famotidine   Suspension 20 milliGRAM(s) Oral every 48 hours  fluconAZOLE   40 mG/mL Suspension 100 milliGRAM(s) Enteral Tube daily  insulin lispro (HumaLOG) corrective regimen sliding scale   SubCutaneous every 6 hours  insulin NPH human recombinant 10 Unit(s) SubCutaneous every 6 hours  lacosamide Solution 50 milliGRAM(s) Oral two times a day  lactobacillus acidophilus 1 Tablet(s) Oral daily  levETIRAcetam  Solution 250 milliGRAM(s) Oral two times a day  levETIRAcetam  Solution 125 milliGRAM(s) Oral daily  levothyroxine 50 MICROGram(s) Oral daily  meropenem  IVPB 500 milliGRAM(s) IV Intermittent every 24 hours  midodrine. 10 milliGRAM(s) Oral every 8 hours    MEDICATIONS  (PRN):  bisacodyl Suppository 10 milliGRAM(s) Rectal daily PRN Constipation  codeine 15 milliGRAM(s) Enteral Tube every 4 hours PRN cough  glucagon  Injectable 1 milliGRAM(s) IntraMuscular once PRN Glucose <70 milliGRAM(s)/deciLiter    CAPILLARY BLOOD GLUCOSE      POCT Blood Glucose.: 137 mg/dL (06 Jan 2020 05:19)  POCT Blood Glucose.: 216 mg/dL (05 Jan 2020 17:28)  POCT Blood Glucose.: 103 mg/dL (05 Jan 2020 12:34)    I&O's Summary    05 Jan 2020 07:01  -  06 Jan 2020 07:00  --------------------------------------------------------  IN: 1500 mL / OUT: 0 mL / NET: 1500 mL        PHYSICAL EXAM:  Vital Signs Last 24 Hrs  T(C): 36.4 (06 Jan 2020 07:58), Max: 37.1 (05 Jan 2020 23:00)  T(F): 97.6 (06 Jan 2020 07:58), Max: 98.7 (05 Jan 2020 23:00)  HR: 83 (06 Jan 2020 07:58) (82 - 99)  BP: 108/58 (06 Jan 2020 07:58) (104/76 - 148/89)  BP(mean): 99 (05 Jan 2020 23:00) (72 - 107)  RR: 18 (06 Jan 2020 07:58) (16 - 28)  SpO2: 98% (06 Jan 2020 07:58) (97% - 100%)    CONSTITUTIONAL: Elderly male, chronically ill appearing  EYES: EOM not intact, does not track, sclera clearaly  RESPIRATORY: Normal respiratory effort; mild course breath sounds that clear with coughing, otherwise lungs are clear to auscultation bilaterally  CARDIOVASCULAR: Regular rate and rhythm, normal S1 and S2, no murmur/rub/gallop; No lower extremity edema  ABDOMEN: normoactive bowel sounds, soft, nontender to palpation, no rebound/guarding; no distension   PSYCH: A+O to person, place, and time; flat affect   NEURO: No facial droop, weakness 3/5 LUE, RUE, and LLE, RLE 2/5    LABS:                        8.2    17.39 )-----------( 319      ( 06 Jan 2020 06:26 )             26.6     01-06    136  |  98  |  58<H>  ----------------------------<  142<H>  3.7   |  23  |  7.36<H>    Ca    9.3      06 Jan 2020 06:26  Phos  3.1     01-06  Mg     2.5     01-06                Culture - Sputum (collected 03 Jan 2020 22:37)  Source: .Sputum Sputum  Gram Stain (04 Jan 2020 06:08):    Few polymorphonuclear leukocytes per low power field    Moderate Squamous epithelial cells per low power field    Rare Gram Variable Cocci seen per oil power field    Few Gram Variable Rods seen per oil power field  Final Report (05 Jan 2020 19:01):    Normal Respiratory Rachel present        RADIOLOGY & ADDITIONAL TESTS:  Results Reviewed:     Imaging Personally Reviewed:    ECG Personally Reviewed:      COORDINATION OF CARE:  Care Discussed with Consultants/Other Providers [Y/N]: Medicine ACP  Prior or Outpatient Records Reviewed [Y/N]:

## 2020-01-06 NOTE — PROGRESS NOTE ADULT - SUBJECTIVE AND OBJECTIVE BOX
CC: f/u for fever    Patient is more alert, responsive, no distress    REVIEW OF SYSTEMS:  limited by underlying condition.    Antimicrobials Day #5, Meropenem  fluconAZOLE   40 mG/mL Suspension 100 milliGRAM(s) Enteral Tube daily  meropenem  IVPB 500 milliGRAM(s) IV Intermittent every 24 hours    Vital Signs Last 24 Hrs  T(C): 36.9 (06 Jan 2020 12:23), Max: 37.1 (05 Jan 2020 23:00)  T(F): 98.5 (06 Jan 2020 12:23), Max: 98.7 (05 Jan 2020 23:00)  HR: 61 (06 Jan 2020 12:23) (61 - 99)  BP: 110/62 (06 Jan 2020 12:23) (104/76 - 148/89)  BP(mean): 99 (05 Jan 2020 23:00) (81 - 107)  RR: 17 (06 Jan 2020 12:23) (16 - 28)  SpO2: 95% (06 Jan 2020 12:23) (95% - 100%)    PHYSICAL EXAM:  General: no acute distress  Eyes:  anicteric, no conjunctival injection, no discharge  Oropharynx: no lesions or injection 	  Neck: supple, without adenopathy  R HD cath site clean  Lungs: ant rhonchi, diminished at bases  Heart: regular rate and rhythm; no murmur, rubs or gallops  Abdomen: soft, distended, nontender, G tube site clean  Skin: no lesions  Extremities: no edema  Neurologic: more alert, interactive    LAB RESULTS:                                   8.2    17.39 )-----------( 319      ( 06 Jan 2020 06:26 )             26.6   01-06    136  |  98  |  58<H>  ----------------------------<  142<H>  3.7   |  23  |  7.36<H>    Ca    9.3      06 Jan 2020 06:26  Phos  3.1     01-06  Mg     2.5     01-06              MICROBIOLOGY:  RECENT CULTURES:  Culture - Sputum . (01.03.20 @ 22:37)    Gram Stain:   Few polymorphonuclear leukocytes per low power field  Moderate Squamous epithelial cells per low power field  Rare Gram Variable Cocci seen per oil power field  Few Gram Variable Rods seen per oil power field    Specimen Source: .Sputum Sputum    Culture Results:   Normal Respiratory Rachel present    01-02 @ 04:04 .Blood Blood-Peripheral     No growth to date.    Culture - Urine (01.02.20 @ 23:06)    Specimen Source: .Urine Clean Catch (Midstream)    Culture Results:   >100,000 CFU/ml Gram Negative Rods  Culture - Urine (01.02.20 @ 23:06)    -  Trimethoprim/Sulfamethoxazole: S <=2/38    -  Tobramycin: S <=4    -  Meropenem: S <=1    -  Levofloxacin: S <=2    -  Gentamicin: S <=4    -  Ciprofloxacin: S <=1    -  Ceftriaxone: S 8    -  Ceftazidime: S 4    -  Amikacin: S <=16    -  Ampicillin/Sulbactam: S <=8/4    -  Cefepime: S <=4    Specimen Source: .Urine Clean Catch (Midstream)    Culture Results:   >100,000 CFU/ml Acinetobacter baumannii complex  Culture in progress    Organism Identification: Acinetobacter baumannii    Organism: Acinetobacter baumannii    Method Type: TALITA    RADIOLOGY REVIEWED:  Xray Chest 1 View- PORTABLE-Urgent (01.04.20 @ 11:48) >  Since 1/2/20 exam, new, mild pulmonary vascular congestive changes have developed.    CT Abdomen and Pelvis w/ Oral Cont and w/ IV Cont (01.02.20 @ 16:50) >  No bowel obstruction.  No CT evidence of acute intra-abdominal or intrathoracic pathology.    < from: CT Head No Cont (01.05.20 @ 08:51) >  Unchanged volume loss, redemonstration multifocal evolving ischemic change less pronounced foci of increased attenuation likely resolving contrast staining,  petechial and subarachnoid hemorrhage not excluded. No new large hemorrhage or midline shift.    < end of copied text >

## 2020-01-06 NOTE — PROGRESS NOTE ADULT - ASSESSMENT
65 yo male with ESRD, HD, PAF, admitted with multiple CVA's.  Difficulty with pulmonary toilet and airway secretions, but clear CXR and CT scan.  S/P PEG  Respiratory status stable  Sptm with Enterobacter and MSSA- viewed as colonizers  Developed fever, dramatic rise in WBC  Vanco x 1  Zosyn started, changed to  Meropenem  Repeat CT of C/A/P on 1/2 no source of infection.  Urine Cx >100k Acinetobacter  Afebrile, WBC lower, more alert- clinically improved    Suggest:  Continue Meropenem day 5  hopefully can limit abx to 7 days total if cont to improve  Follow temps and CBC/diff   D/w family at bedside

## 2020-01-06 NOTE — CHART NOTE - NSCHARTNOTEFT_GEN_A_CORE
MAR MICU TRANSFER NOTE    Please refer to MICU transfer note for full details.    Briefly, this is a    Patient signed out to me     Vital Signs Last 24 Hrs  T(C): 36.7 (06 Jan 2020 05:00), Max: 37.1 (05 Jan 2020 23:00)  T(F): 98.1 (06 Jan 2020 05:00), Max: 98.7 (05 Jan 2020 23:00)  HR: 87 (06 Jan 2020 05:00) (75 - 99)  BP: 138/67 (06 Jan 2020 05:00) (104/76 - 148/89)  BP(mean): 99 (05 Jan 2020 23:00) (72 - 107)  RR: 18 (06 Jan 2020 05:00) (15 - 28)  SpO2: 99% (06 Jan 2020 05:00) (94% - 100%)  I&O's Summary    04 Jan 2020 07:01  -  05 Jan 2020 07:00  --------------------------------------------------------  IN: 2810 mL / OUT: 1000 mL / NET: 1810 mL    05 Jan 2020 07:01  -  06 Jan 2020 06:57  --------------------------------------------------------  IN: 1500 mL / OUT: 0 mL / NET: 1500 mL      Allergies    No Known Allergies    Intolerances      MEDICATIONS  (STANDING):  albuterol/ipratropium for Nebulization 3 milliLiter(s) Nebulizer every 6 hours  apixaban 5 milliGRAM(s) Oral two times a day  atorvastatin 80 milliGRAM(s) Oral at bedtime  budesonide  80 MICROgram(s)/formoterol 4.5 MICROgram(s) Inhaler 2 Puff(s) Inhalation two times a day  calcium acetate 667 milliGRAM(s) Oral three times a day with meals  chlorhexidine 4% Liquid 1 Application(s) Topical <User Schedule>  clopidogrel Tablet 75 milliGRAM(s) Oral daily  dextrose 5%. 1000 milliLiter(s) (50 mL/Hr) IV Continuous <Continuous>  dextrose 50% Injectable 12.5 Gram(s) IV Push once  dextrose 50% Injectable 25 Gram(s) IV Push once  epoetin cortney Injectable 98827 Unit(s) IV Push <User Schedule>  famotidine   Suspension 20 milliGRAM(s) Oral every 48 hours  fluconAZOLE   40 mG/mL Suspension 100 milliGRAM(s) Enteral Tube daily  insulin lispro (HumaLOG) corrective regimen sliding scale   SubCutaneous every 6 hours  insulin NPH human recombinant 10 Unit(s) SubCutaneous every 6 hours  lacosamide Solution 50 milliGRAM(s) Oral two times a day  lactobacillus acidophilus 1 Tablet(s) Oral daily  levETIRAcetam  Solution 250 milliGRAM(s) Oral two times a day  levETIRAcetam  Solution 125 milliGRAM(s) Oral daily  levothyroxine 50 MICROGram(s) Oral daily  meropenem  IVPB 500 milliGRAM(s) IV Intermittent every 24 hours  midodrine. 10 milliGRAM(s) Oral every 8 hours                        8.2    17.39 )-----------( 319      ( 06 Jan 2020 06:26 )             26.6     01-05    136  |  98  |  50<H>  ----------------------------<  94  3.8   |  22  |  6.46<H>    Ca    9.5      05 Jan 2020 22:15  Phos  2.9     01-05  Mg     2.4     01-05        ASSESSMENT & PLAN:       FOR FOLLOW UP: MAR CHART NOTE     Please refer to Medicine note for full details.    Briefly, this is a    Patient signed out to me by NSICU whom also signed out to NP.     Patient seen at bedside, AxO2,     Vital Signs Last 24 Hrs  T(C): 36.7 (06 Jan 2020 05:00), Max: 37.1 (05 Jan 2020 23:00)  T(F): 98.1 (06 Jan 2020 05:00), Max: 98.7 (05 Jan 2020 23:00)  HR: 87 (06 Jan 2020 05:00) (75 - 99)  BP: 138/67 (06 Jan 2020 05:00) (104/76 - 148/89)  BP(mean): 99 (05 Jan 2020 23:00) (72 - 107)  RR: 18 (06 Jan 2020 05:00) (15 - 28)  SpO2: 99% (06 Jan 2020 05:00) (94% - 100%)  I&O's Summary    04 Jan 2020 07:01  -  05 Jan 2020 07:00  --------------------------------------------------------  IN: 2810 mL / OUT: 1000 mL / NET: 1810 mL    05 Jan 2020 07:01  -  06 Jan 2020 06:57  --------------------------------------------------------  IN: 1500 mL / OUT: 0 mL / NET: 1500 mL      Allergies    No Known Allergies    Intolerances      MEDICATIONS  (STANDING):  albuterol/ipratropium for Nebulization 3 milliLiter(s) Nebulizer every 6 hours  apixaban 5 milliGRAM(s) Oral two times a day  atorvastatin 80 milliGRAM(s) Oral at bedtime  budesonide  80 MICROgram(s)/formoterol 4.5 MICROgram(s) Inhaler 2 Puff(s) Inhalation two times a day  calcium acetate 667 milliGRAM(s) Oral three times a day with meals  chlorhexidine 4% Liquid 1 Application(s) Topical <User Schedule>  clopidogrel Tablet 75 milliGRAM(s) Oral daily  dextrose 5%. 1000 milliLiter(s) (50 mL/Hr) IV Continuous <Continuous>  dextrose 50% Injectable 12.5 Gram(s) IV Push once  dextrose 50% Injectable 25 Gram(s) IV Push once  epoetin cortney Injectable 36791 Unit(s) IV Push <User Schedule>  famotidine   Suspension 20 milliGRAM(s) Oral every 48 hours  fluconAZOLE   40 mG/mL Suspension 100 milliGRAM(s) Enteral Tube daily  insulin lispro (HumaLOG) corrective regimen sliding scale   SubCutaneous every 6 hours  insulin NPH human recombinant 10 Unit(s) SubCutaneous every 6 hours  lacosamide Solution 50 milliGRAM(s) Oral two times a day  lactobacillus acidophilus 1 Tablet(s) Oral daily  levETIRAcetam  Solution 250 milliGRAM(s) Oral two times a day  levETIRAcetam  Solution 125 milliGRAM(s) Oral daily  levothyroxine 50 MICROGram(s) Oral daily  meropenem  IVPB 500 milliGRAM(s) IV Intermittent every 24 hours  midodrine. 10 milliGRAM(s) Oral every 8 hours                        8.2    17.39 )-----------( 319      ( 06 Jan 2020 06:26 )             26.6     01-05    136  |  98  |  50<H>  ----------------------------<  94  3.8   |  22  |  6.46<H>    Ca    9.5      05 Jan 2020 22:15  Phos  2.9     01-05  Mg     2.4     01-05        ASSESSMENT & PLAN:       FOR FOLLOW UP: MAR CHART NOTE     Please refer to Medicine note for full details.    Briefly, this is a 65 y/o M with PMH of CAD s/p stents 2009 and 2019, HTN, DM, Carotid stenosis, Hypothyroidism, and ESRD on HD who presented to outside hospital with AMS. Found to have new onset Afib and bilateral extensive bihemispheric infarcts. s/p tPA at outside hospital. Hospital course c/b status epilepticus. Intubated for airway protection, extubated 12/21. Called to eval 12/26 for secretions, leukocytosis. S/p PEG placement 12/27. Hospital course c/b acute and dramatic leukocytosis on 1/1 and fevers, likely 2nd UTI. Worsened encephalopathy on 1/3, was transferred to NSCU 2nd concern for worsened stroke. Patient CT findings were likely 2/2 to contrast accumulation 2/2 ESRD.     Patient signed out to me by NSICU whom also signed out to NP.   Patient seen at bedside overnight, AxO2, LE weakness. Lungs CTA anteriorly. Heart RRR, No murmur.    Vital Signs Last 24 Hrs  T(C): 36.7 (06 Jan 2020 05:00), Max: 37.1 (05 Jan 2020 23:00)  T(F): 98.1 (06 Jan 2020 05:00), Max: 98.7 (05 Jan 2020 23:00)  HR: 87 (06 Jan 2020 05:00) (75 - 99)  BP: 138/67 (06 Jan 2020 05:00) (104/76 - 148/89)  BP(mean): 99 (05 Jan 2020 23:00) (72 - 107)  RR: 18 (06 Jan 2020 05:00) (15 - 28)  SpO2: 99% (06 Jan 2020 05:00) (94% - 100%)  I&O's Summary    04 Jan 2020 07:01  -  05 Jan 2020 07:00  --------------------------------------------------------  IN: 2810 mL / OUT: 1000 mL / NET: 1810 mL    05 Jan 2020 07:01  -  06 Jan 2020 06:57  --------------------------------------------------------  IN: 1500 mL / OUT: 0 mL / NET: 1500 mL      Allergies    No Known Allergies    Intolerances      MEDICATIONS  (STANDING):  albuterol/ipratropium for Nebulization 3 milliLiter(s) Nebulizer every 6 hours  apixaban 5 milliGRAM(s) Oral two times a day  atorvastatin 80 milliGRAM(s) Oral at bedtime  budesonide  80 MICROgram(s)/formoterol 4.5 MICROgram(s) Inhaler 2 Puff(s) Inhalation two times a day  calcium acetate 667 milliGRAM(s) Oral three times a day with meals  chlorhexidine 4% Liquid 1 Application(s) Topical <User Schedule>  clopidogrel Tablet 75 milliGRAM(s) Oral daily  dextrose 5%. 1000 milliLiter(s) (50 mL/Hr) IV Continuous <Continuous>  dextrose 50% Injectable 12.5 Gram(s) IV Push once  dextrose 50% Injectable 25 Gram(s) IV Push once  epoetin cortney Injectable 64237 Unit(s) IV Push <User Schedule>  famotidine   Suspension 20 milliGRAM(s) Oral every 48 hours  fluconAZOLE   40 mG/mL Suspension 100 milliGRAM(s) Enteral Tube daily  insulin lispro (HumaLOG) corrective regimen sliding scale   SubCutaneous every 6 hours  insulin NPH human recombinant 10 Unit(s) SubCutaneous every 6 hours  lacosamide Solution 50 milliGRAM(s) Oral two times a day  lactobacillus acidophilus 1 Tablet(s) Oral daily  levETIRAcetam  Solution 250 milliGRAM(s) Oral two times a day  levETIRAcetam  Solution 125 milliGRAM(s) Oral daily  levothyroxine 50 MICROGram(s) Oral daily  meropenem  IVPB 500 milliGRAM(s) IV Intermittent every 24 hours  midodrine. 10 milliGRAM(s) Oral every 8 hours                        8.2    17.39 )-----------( 319      ( 06 Jan 2020 06:26 )             26.6     01-05    136  |  98  |  50<H>  ----------------------------<  94  3.8   |  22  |  6.46<H>    Ca    9.5      05 Jan 2020 22:15  Phos  2.9     01-05  Mg     2.4     01-05      FOR FOLLOW UP:  - Follow up with infectious disease for duration of antibiotic   - Wean off oxygen as tolerated.  - Consider weaning of midodrine as tolerated.

## 2020-01-06 NOTE — PROGRESS NOTE ADULT - ASSESSMENT
64 M hx of CAD s/p 2009, 2019, HTN, DM2, carotid stenosis, ESRD on HD admitted as transfer on mechanical ventilation for status epilepticus in setting of new embolic CVA At [facility], he received tPA on 12/13/19, was noted to have SAH after tPA and hyponatremia, s/p hypertonic saline, course complicated by status epilepticus and intubated for airway protection, subsequently transferred to [facility] for further management, s/p extubation, course c/b dysphagia and failed S&S eval s/p PEG tube placement, and septic shock in the setting of complicated UTI

## 2020-01-06 NOTE — PROGRESS NOTE ADULT - SUBJECTIVE AND OBJECTIVE BOX
[facility]   MD NINA MASON MD RUORU WONG, PA    TEL:  OFFICE: [phone]  DR HSU CELL: [phone]  RAS MORELOS CELL: [phone]  DR. MARQUEZ CELL: [phone]  DR. ALEX CELL: [phone]    FROM 5 PM - 7 AM PLEASE CALL ANSWERING SERVICE: [phone]    RENAL FOLLOW UP NOTE  --------------------------------------------------------------------------------  HPI:      Pt seen and examined at bedside.   Denies SOB, chest pain     PAST HISTORY  --------------------------------------------------------------------------------  No significant changes to PMH, PSH, FHx, SHx, unless otherwise noted    ALLERGIES & MEDICATIONS  --------------------------------------------------------------------------------  Allergies    No Known Allergies    Intolerances      Standing Inpatient Medications  albuterol/ipratropium for Nebulization 3 milliLiter(s) Nebulizer every 6 hours  apixaban 5 milliGRAM(s) Oral two times a day  atorvastatin 80 milliGRAM(s) Oral at bedtime  budesonide  80 MICROgram(s)/formoterol 4.5 MICROgram(s) Inhaler 2 Puff(s) Inhalation two times a day  calcium acetate 667 milliGRAM(s) Oral three times a day with meals  chlorhexidine 4% Liquid 1 Application(s) Topical <User Schedule>  clopidogrel Tablet 75 milliGRAM(s) Oral daily  dextrose 5%. 1000 milliLiter(s) IV Continuous <Continuous>  dextrose 50% Injectable 12.5 Gram(s) IV Push once  dextrose 50% Injectable 25 Gram(s) IV Push once  epoetin cortney Injectable 80082 Unit(s) IV Push <User Schedule>  famotidine   Suspension 20 milliGRAM(s) Oral every 48 hours  fluconAZOLE   40 mG/mL Suspension 100 milliGRAM(s) Enteral Tube daily  insulin lispro (HumaLOG) corrective regimen sliding scale   SubCutaneous every 6 hours  insulin NPH human recombinant 10 Unit(s) SubCutaneous every 6 hours  lacosamide Solution 50 milliGRAM(s) Oral two times a day  lactobacillus acidophilus 1 Tablet(s) Oral daily  levETIRAcetam  Solution 250 milliGRAM(s) Oral two times a day  levETIRAcetam  Solution 125 milliGRAM(s) Oral daily  levothyroxine 50 MICROGram(s) Oral daily  meropenem  IVPB 500 milliGRAM(s) IV Intermittent every 24 hours  midodrine 5 milliGRAM(s) Oral every 8 hours    PRN Inpatient Medications  bisacodyl Suppository 10 milliGRAM(s) Rectal daily PRN  codeine 15 milliGRAM(s) Enteral Tube every 4 hours PRN  glucagon  Injectable 1 milliGRAM(s) IntraMuscular once PRN      REVIEW OF SYSTEMS  --------------------------------------------------------------------------------  General: no fever  CVS: no chest pain  MSK: no edema     VITALS/PHYSICAL EXAM  --------------------------------------------------------------------------------  T(C): 36.9 (01-06-20 @ 12:23), Max: 37.1 (01-05-20 @ 23:00)  HR: 61 (01-06-20 @ 12:23) (61 - 99)  BP: 110/62 (01-06-20 @ 12:23) (104/76 - 148/89)  RR: 17 (01-06-20 @ 12:23) (16 - 28)  SpO2: 95% (01-06-20 @ 12:23) (95% - 100%)  Wt(kg): --        01-05-20 @ 07:01  -  01-06-20 @ 07:00  --------------------------------------------------------  IN: 1500 mL / OUT: 0 mL / NET: 1500 mL      Physical Exam:  	Gen: NAD  	HEENT: MMM  	Pulm: CTA B/L  	CV: S1S2  	Abd: Soft, +BS  	Ext: No LE edema B/L                      Neuro: Awake   	Skin: Warm and Dry   	Vascular access: tunnelled hd catheter    LABS/STUDIES  --------------------------------------------------------------------------------              8.2    17.39 >-----------<  319      [01-06-20 @ 06:26]              26.6     136  |  98  |  58  ----------------------------<  142      [01-06-20 @ 06:26]  3.7   |  23  |  7.36        Ca     9.3     [01-06-20 @ 06:26]      Mg     2.5     [01-06-20 @ 06:26]      Phos  3.1     [01-06-20 @ 06:26]            Creatinine Trend:  SCr 7.36 [01-06 @ 06:26]  SCr 6.46 [01-05 @ 22:15]  SCr 4.41 [01-04 @ 22:51]  SCr 3.71 [01-04 @ 16:54]  SCr 6.62 [01-04 @ 01:54]    Urinalysis - [01-02-20 @ 20:34]      Color Light Yellow / Appearance Slightly Turbid / SG 1.012 / pH 7.0      Gluc 100 mg/dL / Ketone Negative  / Bili Negative / Urobili Negative       Blood Small / Protein 300 mg/dL / Leuk Est Moderate / Nitrite Negative      RBC 1 / WBC 46 / Hyaline 1 / Gran  / Sq Epi  / Non Sq Epi 0 / Bacteria Many      Iron 45, TIBC 145, %sat 31      [12-26-19 @ 23:24]  Ferritin 1369      [12-26-19 @ 23:24]  PTH -- (Ca 6.9)      [06-05-19 @ 08:14]   562  HbA1c 8.4      [12-15-19 @ 11:00]  TSH 6.22      [01-03-20 @ 10:03]  Lipid: chol 134, , HDL 56, LDL 52      [12-15-19 @ 10:56]    HBsAb >1000.0      [12-14-19 @ 23:48]  HBsAg Nonreact      [12-14-19 @ 23:48]  HCV 0.15, Nonreact      [12-14-19 @ 23:48]

## 2020-01-06 NOTE — PROGRESS NOTE ADULT - PROBLEM SELECTOR PLAN 4
S/p CARLA in June 2019  TTE in 12/2019 shows normal LVSF and trace MR.   Significant tropninemia in the setting of septic shock, likely type II; Down-trending   - Continue Plavix in setting of history of CAD and in setting of carotid stenosis.   - Continue Atorvastatin  - Holding coreg in light of recent septic shock, will resume once clinically stable

## 2020-01-06 NOTE — PROGRESS NOTE ADULT - ASSESSMENT
63 y/o M with PMH of CAD s/p stents 2009 and 2019, HTN, DM, Carotid stenosis, Hypothyroidism, and ESRD on HD who presented to outside hospital with AMS. Found to have new onset Afib and bilateral extensive bihemispheric infarcts. s/p tPA at outside hospital. Hospital course c/b status epilepticus. Intubated for airway protection, extubated 12/21. Called to eval 12/26 for secretions, leukocytosis. S/p PEG placement 12/27. Hospital course c/b acute and dramatic leukocytosis on 1/1 and fevers, likely 2nd UTI. Worsened encephalopathy on 1/3, was transferred to NSCU 2nd concern for worsened stroke.

## 2020-01-07 LAB
ANION GAP SERPL CALC-SCNC: 18 MMOL/L — HIGH (ref 5–17)
BASOPHILS # BLD AUTO: 0.07 K/UL — SIGNIFICANT CHANGE UP (ref 0–0.2)
BASOPHILS NFR BLD AUTO: 0.6 % — SIGNIFICANT CHANGE UP (ref 0–2)
BUN SERPL-MCNC: 68 MG/DL — HIGH (ref 7–23)
CALCIUM SERPL-MCNC: 9.3 MG/DL — SIGNIFICANT CHANGE UP (ref 8.4–10.5)
CHLORIDE SERPL-SCNC: 101 MMOL/L — SIGNIFICANT CHANGE UP (ref 96–108)
CO2 SERPL-SCNC: 21 MMOL/L — LOW (ref 22–31)
CREAT SERPL-MCNC: 9.06 MG/DL — HIGH (ref 0.5–1.3)
CULTURE RESULTS: SIGNIFICANT CHANGE UP
CULTURE RESULTS: SIGNIFICANT CHANGE UP
EOSINOPHIL # BLD AUTO: 0.39 K/UL — SIGNIFICANT CHANGE UP (ref 0–0.5)
EOSINOPHIL NFR BLD AUTO: 3.1 % — SIGNIFICANT CHANGE UP (ref 0–6)
GLUCOSE BLDC GLUCOMTR-MCNC: 207 MG/DL — HIGH (ref 70–99)
GLUCOSE BLDC GLUCOMTR-MCNC: 73 MG/DL — SIGNIFICANT CHANGE UP (ref 70–99)
GLUCOSE SERPL-MCNC: 219 MG/DL — HIGH (ref 70–99)
HCT VFR BLD CALC: 27.8 % — LOW (ref 39–50)
HGB BLD-MCNC: 8.5 G/DL — LOW (ref 13–17)
IMM GRANULOCYTES NFR BLD AUTO: 2.9 % — HIGH (ref 0–1.5)
LYMPHOCYTES # BLD AUTO: 1.84 K/UL — SIGNIFICANT CHANGE UP (ref 1–3.3)
LYMPHOCYTES # BLD AUTO: 14.5 % — SIGNIFICANT CHANGE UP (ref 13–44)
MCHC RBC-ENTMCNC: 29.8 PG — SIGNIFICANT CHANGE UP (ref 27–34)
MCHC RBC-ENTMCNC: 30.6 GM/DL — LOW (ref 32–36)
MCV RBC AUTO: 97.5 FL — SIGNIFICANT CHANGE UP (ref 80–100)
MONOCYTES # BLD AUTO: 0.95 K/UL — HIGH (ref 0–0.9)
MONOCYTES NFR BLD AUTO: 7.5 % — SIGNIFICANT CHANGE UP (ref 2–14)
NEUTROPHILS # BLD AUTO: 9.05 K/UL — HIGH (ref 1.8–7.4)
NEUTROPHILS NFR BLD AUTO: 71.4 % — SIGNIFICANT CHANGE UP (ref 43–77)
PLATELET # BLD AUTO: 308 K/UL — SIGNIFICANT CHANGE UP (ref 150–400)
POTASSIUM SERPL-MCNC: 4.4 MMOL/L — SIGNIFICANT CHANGE UP (ref 3.5–5.3)
POTASSIUM SERPL-SCNC: 4.4 MMOL/L — SIGNIFICANT CHANGE UP (ref 3.5–5.3)
RBC # BLD: 2.85 M/UL — LOW (ref 4.2–5.8)
RBC # FLD: 15.8 % — HIGH (ref 10.3–14.5)
SODIUM SERPL-SCNC: 140 MMOL/L — SIGNIFICANT CHANGE UP (ref 135–145)
SPECIMEN SOURCE: SIGNIFICANT CHANGE UP
SPECIMEN SOURCE: SIGNIFICANT CHANGE UP
WBC # BLD: 12.67 K/UL — HIGH (ref 3.8–10.5)
WBC # FLD AUTO: 12.67 K/UL — HIGH (ref 3.8–10.5)

## 2020-01-07 PROCEDURE — 99232 SBSQ HOSP IP/OBS MODERATE 35: CPT

## 2020-01-07 RX ORDER — SIMETHICONE 80 MG/1
80 TABLET, CHEWABLE ORAL
Refills: 0 | Status: DISCONTINUED | OUTPATIENT
Start: 2020-01-07 | End: 2020-01-14

## 2020-01-07 RX ORDER — MIDODRINE HYDROCHLORIDE 2.5 MG/1
5 TABLET ORAL EVERY 8 HOURS
Refills: 0 | Status: DISCONTINUED | OUTPATIENT
Start: 2020-01-07 | End: 2020-01-08

## 2020-01-07 RX ADMIN — MIDODRINE HYDROCHLORIDE 5 MILLIGRAM(S): 2.5 TABLET ORAL at 13:46

## 2020-01-07 RX ADMIN — LEVETIRACETAM 250 MILLIGRAM(S): 250 TABLET, FILM COATED ORAL at 18:32

## 2020-01-07 RX ADMIN — CODEINE SULFATE 15 MILLIGRAM(S): 60 TABLET ORAL at 06:16

## 2020-01-07 RX ADMIN — MIDODRINE HYDROCHLORIDE 5 MILLIGRAM(S): 2.5 TABLET ORAL at 23:16

## 2020-01-07 RX ADMIN — Medication 667 MILLIGRAM(S): at 06:15

## 2020-01-07 RX ADMIN — Medication 2: at 18:33

## 2020-01-07 RX ADMIN — Medication 3 MILLILITER(S): at 23:15

## 2020-01-07 RX ADMIN — LEVETIRACETAM 125 MILLIGRAM(S): 250 TABLET, FILM COATED ORAL at 13:44

## 2020-01-07 RX ADMIN — BUDESONIDE AND FORMOTEROL FUMARATE DIHYDRATE 2 PUFF(S): 160; 4.5 AEROSOL RESPIRATORY (INHALATION) at 06:14

## 2020-01-07 RX ADMIN — Medication 667 MILLIGRAM(S): at 23:16

## 2020-01-07 RX ADMIN — MIDODRINE HYDROCHLORIDE 10 MILLIGRAM(S): 2.5 TABLET ORAL at 00:19

## 2020-01-07 RX ADMIN — ATORVASTATIN CALCIUM 80 MILLIGRAM(S): 80 TABLET, FILM COATED ORAL at 23:16

## 2020-01-07 RX ADMIN — HUMAN INSULIN 10 UNIT(S): 100 INJECTION, SUSPENSION SUBCUTANEOUS at 13:49

## 2020-01-07 RX ADMIN — Medication 50 MICROGRAM(S): at 06:15

## 2020-01-07 RX ADMIN — LEVETIRACETAM 250 MILLIGRAM(S): 250 TABLET, FILM COATED ORAL at 06:15

## 2020-01-07 RX ADMIN — HUMAN INSULIN 10 UNIT(S): 100 INJECTION, SUSPENSION SUBCUTANEOUS at 23:16

## 2020-01-07 RX ADMIN — Medication 3 MILLILITER(S): at 18:32

## 2020-01-07 RX ADMIN — APIXABAN 5 MILLIGRAM(S): 2.5 TABLET, FILM COATED ORAL at 06:16

## 2020-01-07 RX ADMIN — FAMOTIDINE 20 MILLIGRAM(S): 10 INJECTION INTRAVENOUS at 13:47

## 2020-01-07 RX ADMIN — MIDODRINE HYDROCHLORIDE 10 MILLIGRAM(S): 2.5 TABLET ORAL at 06:15

## 2020-01-07 RX ADMIN — LACOSAMIDE 50 MILLIGRAM(S): 50 TABLET ORAL at 18:50

## 2020-01-07 RX ADMIN — MEROPENEM 100 MILLIGRAM(S): 1 INJECTION INTRAVENOUS at 18:32

## 2020-01-07 RX ADMIN — Medication 667 MILLIGRAM(S): at 13:46

## 2020-01-07 RX ADMIN — HUMAN INSULIN 10 UNIT(S): 100 INJECTION, SUSPENSION SUBCUTANEOUS at 18:33

## 2020-01-07 RX ADMIN — Medication 4: at 06:16

## 2020-01-07 RX ADMIN — HUMAN INSULIN 10 UNIT(S): 100 INJECTION, SUSPENSION SUBCUTANEOUS at 06:17

## 2020-01-07 RX ADMIN — Medication 1 TABLET(S): at 13:47

## 2020-01-07 RX ADMIN — CLOPIDOGREL BISULFATE 75 MILLIGRAM(S): 75 TABLET, FILM COATED ORAL at 13:45

## 2020-01-07 RX ADMIN — Medication 3 MILLILITER(S): at 13:48

## 2020-01-07 RX ADMIN — SIMETHICONE 80 MILLIGRAM(S): 80 TABLET, CHEWABLE ORAL at 18:32

## 2020-01-07 RX ADMIN — LACOSAMIDE 50 MILLIGRAM(S): 50 TABLET ORAL at 06:15

## 2020-01-07 RX ADMIN — Medication 3 MILLILITER(S): at 06:15

## 2020-01-07 RX ADMIN — APIXABAN 5 MILLIGRAM(S): 2.5 TABLET, FILM COATED ORAL at 18:32

## 2020-01-07 NOTE — PROGRESS NOTE ADULT - SUBJECTIVE AND OBJECTIVE BOX
[facility] DIVISION OF KIDNEY DISEASES AND HYPERTENSION -- HEMODIALYSIS NOTE  [phone]--------------------------------------------------------------------------------  Chief Complaint: ESRD/Ongoing hemodialysis requirement    24 hour events/subjective:    Pt seen on dialysis tolerating well   BP stable    PAST HISTORY  --------------------------------------------------------------------------------  No significant changes to PMH, PSH, FHx, SHx, unless otherwise noted    ALLERGIES & MEDICATIONS  --------------------------------------------------------------------------------  Allergies    No Known Allergies    Intolerances      Standing Inpatient Medications  albuterol/ipratropium for Nebulization 3 milliLiter(s) Nebulizer every 6 hours  apixaban 5 milliGRAM(s) Oral two times a day  atorvastatin 80 milliGRAM(s) Oral at bedtime  budesonide  80 MICROgram(s)/formoterol 4.5 MICROgram(s) Inhaler 2 Puff(s) Inhalation two times a day  calcium acetate 667 milliGRAM(s) Oral three times a day with meals  chlorhexidine 4% Liquid 1 Application(s) Topical <User Schedule>  clopidogrel Tablet 75 milliGRAM(s) Oral daily  dextrose 5%. 1000 milliLiter(s) IV Continuous <Continuous>  dextrose 50% Injectable 12.5 Gram(s) IV Push once  dextrose 50% Injectable 25 Gram(s) IV Push once  epoetin cortney Injectable 91457 Unit(s) IV Push <User Schedule>  famotidine   Suspension 20 milliGRAM(s) Oral every 48 hours  fluconAZOLE   40 mG/mL Suspension 100 milliGRAM(s) Enteral Tube daily  insulin lispro (HumaLOG) corrective regimen sliding scale   SubCutaneous every 6 hours  insulin NPH human recombinant 10 Unit(s) SubCutaneous every 6 hours  lacosamide Solution 50 milliGRAM(s) Oral two times a day  lactobacillus acidophilus 1 Tablet(s) Oral daily  levETIRAcetam  Solution 250 milliGRAM(s) Oral two times a day  levETIRAcetam  Solution 125 milliGRAM(s) Oral daily  levothyroxine 50 MICROGram(s) Oral daily  meropenem  IVPB 500 milliGRAM(s) IV Intermittent every 24 hours  midodrine 5 milliGRAM(s) Oral every 8 hours    PRN Inpatient Medications  bisacodyl Suppository 10 milliGRAM(s) Rectal daily PRN  codeine 15 milliGRAM(s) Enteral Tube every 4 hours PRN  glucagon  Injectable 1 milliGRAM(s) IntraMuscular once PRN      REVIEW OF SYSTEMS  --------------------------------------------------------------------------------  As above.    VITALS/PHYSICAL EXAM  --------------------------------------------------------------------------------  T(C): 36.5 (01-07-20 @ 09:48), Max: 36.9 (01-06-20 @ 12:23)  HR: 69 (01-07-20 @ 09:48) (61 - 74)  BP: 122/63 (01-07-20 @ 09:48) (110/62 - 129/68)  RR: 17 (01-07-20 @ 09:48) (17 - 18)  SpO2: 100% (01-07-20 @ 09:48) (95% - 100%)  Wt(kg): --        01-06-20 @ 07:01  -  01-07-20 @ 07:00  --------------------------------------------------------  IN: 0 mL / OUT: 0 mL / NET: 0 mL      Physical Exam:  	Gen: NAD  	HEENT: ELENA  	Pulm: CTA B/L  	CV: S1S2  	Abd: Soft  	Ext: No LE edema B/L  	Neuro: Awake  	Skin: Warm and Dry   	Vascular access: tunnelled HD catheter     LABS/STUDIES  --------------------------------------------------------------------------------              8.5    12.67 >-----------<  308      [01-07-20 @ 09:44]              27.8     140  |  101  |  68  ----------------------------<  219      [01-07-20 @ 06:26]  4.4   |  21  |  9.06        Ca     9.3     [01-07-20 @ 06:26]      Mg     2.5     [01-06-20 @ 06:26]      Phos  3.1     [01-06-20 @ 06:26]            Iron 45, TIBC 145, %sat 31      [12-26-19 @ 23:24]  Ferritin 1369      [12-26-19 @ 23:24]  PTH -- (Ca 6.9)      [06-05-19 @ 08:14]   562  HbA1c 8.4      [12-15-19 @ 11:00]  TSH 6.22      [01-03-20 @ 10:03]  Lipid: chol 134, , HDL 56, LDL 52      [12-15-19 @ 10:56]    HBsAb >1000.0      [12-14-19 @ 23:48]  HBsAg Nonreact      [12-14-19 @ 23:48]  HCV 0.15, Nonreact      [12-14-19 @ 23:48]

## 2020-01-07 NOTE — PROGRESS NOTE ADULT - PROBLEM SELECTOR PLAN 1
Based on work up thus far, likely secondary to complicated UTI  Clinically improving and leukocytosis appropriately downtrending  UCx: Acinetobacter (Pansensitive)  BCx: NGTD, RVP Negative, CT A/P unrevealing for infectious source  - Continue Meropenem x 7 total days  - ID following, will appreciate recommendations  - wean midodrine to 50mg po tid

## 2020-01-07 NOTE — PROGRESS NOTE ADULT - ASSESSMENT
65 yo male with ESRD on HD (M-W-FRI without AVF as awaiting renal transplant expected within 3 months; Dr. Barrientos; [facility]), CAD s/p stent 2009 and CARLA in June 2019, HTN, DM type 2, hypothyroidism and left carotid stenosis admitted for altered mental status during dialysis, he became unresponsive and arm not moving therefore he was sent to hospital. At [facility], he received tPA on 12/13/19. Was noted to have SAH after tPA. Started on 3% saline. Had seizure, despite multiple AEDs remained in status epilepticus and was transferred to [facility] for continuous EEG monitoring. s/p extubation, PEG tube placement. Is awake and following commands. Nephrology is following for ESRD management.    ESRD on HD TTS via tunnelled HD catheter   - HD consent in the chart  - Last HD 1/4/2020 with 1 L UF  -Pt seen on dialysis, tolerating well. Bp stable    - please dose Keppra post HD on HD days      - needs rehab w/ onsite HD    Hyponatremia  - from increased free water intake  - Use minimum possible free water flushes  - should improve w/ HD    Anemia  - unable to use epogen due to CVA  - transfuse PRN Hgb <7 or hemodynamically significant bleeding  - no evidence of occult bleeding  - GI following     HTN  -BP stable  - Monitor closely    Fever   -etiology?  -s/p CT ABd   - PT also with permacath -- follow up ID

## 2020-01-07 NOTE — PROGRESS NOTE ADULT - PROBLEM SELECTOR PLAN 5
CHADVASCs  score 4  - Holding coreg in setting of hypotension, as above will add back once clinically stable from shock perspective and off midodrine  - Eliquis for AC

## 2020-01-07 NOTE — PROGRESS NOTE ADULT - PROBLEM SELECTOR PLAN 8
- Regular insulin 10 units every 6 hours,   - Sliding scale  - q 6 sugars  - PEG feeds    # abd pain- suspect 2/2 recent PEG manipulation vs. gaseous distention vs. less likely ileus/SBO or gastroeneteritis. Must also r/o gb/liver pathology and pancreatitis as pt unable to fully articulate ROS  - serial abd exams  - gas-x bid  - add on hepatic panel and lipase - Regular insulin 10 units every 6 hours,   - Sliding scale  - q 6 sugars  - PEG feeds    # abd pain- suspect 2/2 recent PEG manipulation vs. gaseous distention vs. less likely ileus/SBO or gastroeneteritis. Must also r/o gb/liver pathology and pancreatitis as pt unable to fully articulate ROS  - serial abd exams- if worsens would get stat abd xray or CT abd/pelvis  - gas-x bid  - add on hepatic panel and lipase

## 2020-01-07 NOTE — PROGRESS NOTE ADULT - PROBLEM SELECTOR PLAN 4
S/p CARLA in June 2019  TTE in 12/2019 shows normal LVSF and trace MR.   Significant tropninemia in the setting of septic shock, likely type II; Down-trending   - Continue Plavix in setting of history of CAD and in setting of carotid stenosis.   - Continue Atorvastatin  - Holding coreg in light of recent septic shock, will resume once clinically stable and off midodrine

## 2020-01-07 NOTE — PROGRESS NOTE ADULT - SUBJECTIVE AND OBJECTIVE BOX
CC: f/u for fever    Patient is more alert, responsive, no distress    REVIEW OF SYSTEMS:  limited by underlying condition.    Antimicrobials Day #6, Meropenem  fluconAZOLE   40 mG/mL Suspension 100 milliGRAM(s) Enteral Tube daily  meropenem  IVPB 500 milliGRAM(s) IV Intermittent every 24 hours      Vital Signs Last 24 Hrs  T(C): 36.9 (06 Jan 2020 12:23), Max: 37.1 (05 Jan 2020 23:00)  T(F): 98.5 (06 Jan 2020 12:23), Max: 98.7 (05 Jan 2020 23:00)  HR: 61 (06 Jan 2020 12:23) (61 - 99)  BP: 110/62 (06 Jan 2020 12:23) (104/76 - 148/89)  BP(mean): 99 (05 Jan 2020 23:00) (81 - 107)  RR: 17 (06 Jan 2020 12:23) (16 - 28)  SpO2: 95% (06 Jan 2020 12:23) (95% - 100%)    PHYSICAL EXAM:  General: no acute distress  Eyes:  anicteric, no conjunctival injection, no discharge  Oropharynx: no lesions or injection 	  Neck: supple, without adenopathy  R HD cath site clean  Lungs: ant rhonchi, diminished at bases  Heart: regular rate and rhythm; no murmur, rubs or gallops  Abdomen: soft, distended, nontender, G tube site clean  Skin: no lesions  Extremities: no edema  Neurologic: more alert, interactive    LAB RESULTS:                                     8.5    12.67 )-----------( 308      ( 07 Jan 2020 09:44 )             27.8   01-07    140  |  101  |  68<H>  ----------------------------<  219<H>  4.4   |  21<L>  |  9.06<H>    Ca    9.3      07 Jan 2020 06:26  Phos  3.1     01-06  Mg     2.5     01-06                  MICROBIOLOGY:  RECENT CULTURES:  Culture - Sputum . (01.03.20 @ 22:37)    Gram Stain:   Few polymorphonuclear leukocytes per low power field  Moderate Squamous epithelial cells per low power field  Rare Gram Variable Cocci seen per oil power field  Few Gram Variable Rods seen per oil power field    Specimen Source: .Sputum Sputum    Culture Results:   Normal Respiratory Rachel present    01-02 @ 04:04 .Blood Blood-Peripheral     No growth to date.    Culture - Urine (01.02.20 @ 23:06)    Specimen Source: .Urine Clean Catch (Midstream)    Culture Results:   >100,000 CFU/ml Gram Negative Rods  Culture - Urine (01.02.20 @ 23:06)    -  Trimethoprim/Sulfamethoxazole: S <=2/38    -  Tobramycin: S <=4    -  Meropenem: S <=1    -  Levofloxacin: S <=2    -  Gentamicin: S <=4    -  Ciprofloxacin: S <=1    -  Ceftriaxone: S 8    -  Ceftazidime: S 4    -  Amikacin: S <=16    -  Ampicillin/Sulbactam: S <=8/4    -  Cefepime: S <=4    Specimen Source: .Urine Clean Catch (Midstream)    Culture Results:   >100,000 CFU/ml Acinetobacter baumannii complex  Culture in progress    Organism Identification: Acinetobacter baumannii    Organism: Acinetobacter baumannii    Method Type: TALITA    RADIOLOGY REVIEWED:  Xray Chest 1 View- PORTABLE-Urgent (01.04.20 @ 11:48) >  Since 1/2/20 exam, new, mild pulmonary vascular congestive changes have developed.    CT Abdomen and Pelvis w/ Oral Cont and w/ IV Cont (01.02.20 @ 16:50) >  No bowel obstruction.  No CT evidence of acute intra-abdominal or intrathoracic pathology.    < from: CT Head No Cont (01.05.20 @ 08:51) >  Unchanged volume loss, redemonstration multifocal evolving ischemic change less pronounced foci of increased attenuation likely resolving contrast staining,  petechial and subarachnoid hemorrhage not excluded. No new large hemorrhage or midline shift.    < end of copied text > CC: f/u for fever    Patient is more alert, responsive, no distress  seen at HD today    REVIEW OF SYSTEMS:  limited by underlying condition.    Antimicrobials Day #6, Meropenem  fluconAZOLE   40 mG/mL Suspension 100 milliGRAM(s) Enteral Tube daily  meropenem  IVPB 500 milliGRAM(s) IV Intermittent every 24 hours      Vital Signs Last 24 Hrs  T(C): 36.9 (06 Jan 2020 12:23), Max: 37.1 (05 Jan 2020 23:00)  T(F): 98.5 (06 Jan 2020 12:23), Max: 98.7 (05 Jan 2020 23:00)  HR: 61 (06 Jan 2020 12:23) (61 - 99)  BP: 110/62 (06 Jan 2020 12:23) (104/76 - 148/89)  BP(mean): 99 (05 Jan 2020 23:00) (81 - 107)  RR: 17 (06 Jan 2020 12:23) (16 - 28)  SpO2: 95% (06 Jan 2020 12:23) (95% - 100%)    PHYSICAL EXAM:  General: no acute distress  Eyes:  anicteric, no conjunctival injection, no discharge  Oropharynx: no lesions or injection 	  Neck: supple, without adenopathy  R HD cath site clean  Lungs: ant rhonchi, diminished at bases  Heart: regular rate and rhythm; no murmur, rubs or gallops  Abdomen: soft, distended, nontender, G tube site clean  Skin: no lesions  Extremities: no edema  Neurologic: more alert, interactive    LAB RESULTS:                                     8.5    12.67 )-----------( 308      ( 07 Jan 2020 09:44 )             27.8   01-07    140  |  101  |  68<H>  ----------------------------<  219<H>  4.4   |  21<L>  |  9.06<H>    Ca    9.3      07 Jan 2020 06:26  Phos  3.1     01-06  Mg     2.5     01-06                  MICROBIOLOGY:  RECENT CULTURES:  Culture - Sputum . (01.03.20 @ 22:37)    Gram Stain:   Few polymorphonuclear leukocytes per low power field  Moderate Squamous epithelial cells per low power field  Rare Gram Variable Cocci seen per oil power field  Few Gram Variable Rods seen per oil power field    Specimen Source: .Sputum Sputum    Culture Results:   Normal Respiratory Rachel present    01-02 @ 04:04 .Blood Blood-Peripheral     No growth to date.    Culture - Urine (01.02.20 @ 23:06)    Specimen Source: .Urine Clean Catch (Midstream)    Culture Results:   >100,000 CFU/ml Gram Negative Rods  Culture - Urine (01.02.20 @ 23:06)    -  Trimethoprim/Sulfamethoxazole: S <=2/38    -  Tobramycin: S <=4    -  Meropenem: S <=1    -  Levofloxacin: S <=2    -  Gentamicin: S <=4    -  Ciprofloxacin: S <=1    -  Ceftriaxone: S 8    -  Ceftazidime: S 4    -  Amikacin: S <=16    -  Ampicillin/Sulbactam: S <=8/4    -  Cefepime: S <=4    Specimen Source: .Urine Clean Catch (Midstream)    Culture Results:   >100,000 CFU/ml Acinetobacter baumannii complex  Culture in progress    Organism Identification: Acinetobacter baumannii    Organism: Acinetobacter baumannii    Method Type: TALITA    RADIOLOGY REVIEWED:  Xray Chest 1 View- PORTABLE-Urgent (01.04.20 @ 11:48) >  Since 1/2/20 exam, new, mild pulmonary vascular congestive changes have developed.    CT Abdomen and Pelvis w/ Oral Cont and w/ IV Cont (01.02.20 @ 16:50) >  No bowel obstruction.  No CT evidence of acute intra-abdominal or intrathoracic pathology.    < from: CT Head No Cont (01.05.20 @ 08:51) >  Unchanged volume loss, redemonstration multifocal evolving ischemic change less pronounced foci of increased attenuation likely resolving contrast staining,  petechial and subarachnoid hemorrhage not excluded. No new large hemorrhage or midline shift.    < end of copied text >

## 2020-01-07 NOTE — PROGRESS NOTE ADULT - ASSESSMENT
64 M hx of CAD s/p 2009, 2019, HTN, DM2, carotid stenosis, ESRD on HD admitted as transfer on mechanical ventilation for status epilepticus in setting of new embolic CVA At [facility], he received tPA on 12/13/19, was noted to have SAH after tPA and hyponatremia, s/p hypertonic saline, course complicated by status epilepticus and intubated for airway protection, subsequently transferred to [facility] for further management, s/p extubation, course c/b dysphagia and failed S&S eval s/p PEG tube placement, and septic shock attributed to complicated UTI and refractory cough likely from ongoing aspiration

## 2020-01-07 NOTE — PROGRESS NOTE ADULT - ASSESSMENT
65 y/o M with PMH of CAD s/p stents 2009 and 2019, HTN, DM, Carotid stenosis, Hypothyroidism, and ESRD on HD who presented to outside hospital with AMS. Found to have new onset Afib and bilateral extensive bihemispheric infarcts. s/p tPA at outside hospital. Hospital course c/b status epilepticus. Intubated for airway protection, extubated 12/21. Called to eval 12/26 for secretions, leukocytosis. S/p PEG placement 12/27. Hospital course c/b acute and dramatic leukocytosis on 1/1 and fevers, likely 2nd UTI. Worsened encephalopathy on 1/3, was transferred to [facility] 2nd concern for worsened stroke on imaging which was later determined to be enhanced uptake of contrast in areas of old infarct.

## 2020-01-07 NOTE — PROGRESS NOTE ADULT - PROBLEM SELECTOR PLAN 1
Minimal cough per staff  -d/c Codeine, this should be avoided for cough with renal failure patients   -CXR 1/4 with pulm edema in the setting of volume resuscitation   -Please repeat CXR in AM   -CT chest 12/24 and 1/2 are negative for PNA  -RVP negative  -Duoneb q6  -d/c Symbicort. Patient has no history of COPD or asthma and is being treated for thrush. Doubt cough is 2nd bronchospasm. More likely volume overload vs. micro-aspiration

## 2020-01-07 NOTE — PROGRESS NOTE ADULT - SUBJECTIVE AND OBJECTIVE BOX
Shen De Dios MD  Division of Hospital Medicine  Pager: [phone]  If no response or off-hours, page [phone]  -------------------------------------    Patient is a 64y old  Male who presents with a chief complaint of Seizures (07 Jan 2020 12:30)      SUBJECTIVE / OVERNIGHT EVENTS: none acute  ADDITIONAL REVIEW OF SYSTEMS: pt seen and examined in HD- tolerating well but noted to have intermittent gurgling/coughing. Reports upper abdominal/epigastric pain, otherwise ROS difficult to obtain due to dysarthria.    MEDICATIONS  (STANDING):  albuterol/ipratropium for Nebulization 3 milliLiter(s) Nebulizer every 6 hours  apixaban 5 milliGRAM(s) Oral two times a day  atorvastatin 80 milliGRAM(s) Oral at bedtime  calcium acetate 667 milliGRAM(s) Oral three times a day with meals  chlorhexidine 4% Liquid 1 Application(s) Topical <User Schedule>  clopidogrel Tablet 75 milliGRAM(s) Oral daily  dextrose 5%. 1000 milliLiter(s) (50 mL/Hr) IV Continuous <Continuous>  dextrose 50% Injectable 12.5 Gram(s) IV Push once  dextrose 50% Injectable 25 Gram(s) IV Push once  famotidine   Suspension 20 milliGRAM(s) Oral every 48 hours  fluconAZOLE   40 mG/mL Suspension 100 milliGRAM(s) Enteral Tube daily  insulin lispro (HumaLOG) corrective regimen sliding scale   SubCutaneous every 6 hours  insulin NPH human recombinant 10 Unit(s) SubCutaneous every 6 hours  lacosamide Solution 50 milliGRAM(s) Oral two times a day  lactobacillus acidophilus 1 Tablet(s) Oral daily  levETIRAcetam  Solution 250 milliGRAM(s) Oral two times a day  levETIRAcetam  Solution 125 milliGRAM(s) Oral daily  levothyroxine 50 MICROGram(s) Oral daily  meropenem  IVPB 500 milliGRAM(s) IV Intermittent every 24 hours  midodrine 5 milliGRAM(s) Oral every 8 hours    MEDICATIONS  (PRN):  bisacodyl Suppository 10 milliGRAM(s) Rectal daily PRN Constipation  glucagon  Injectable 1 milliGRAM(s) IntraMuscular once PRN Glucose <70 milliGRAM(s)/deciLiter      CAPILLARY BLOOD GLUCOSE      POCT Blood Glucose.: 92 mg/dL (07 Jan 2020 12:07)  POCT Blood Glucose.: 73 mg/dL (07 Jan 2020 09:32)  POCT Blood Glucose.: 207 mg/dL (07 Jan 2020 05:42)  POCT Blood Glucose.: 110 mg/dL (06 Jan 2020 23:44)  POCT Blood Glucose.: 162 mg/dL (06 Jan 2020 17:58)    I&O's Summary    06 Jan 2020 07:01  -  07 Jan 2020 07:00  --------------------------------------------------------  IN: 0 mL / OUT: 0 mL / NET: 0 mL    07 Jan 2020 07:01  -  07 Jan 2020 13:24  --------------------------------------------------------  IN: 0 mL / OUT: 1100 mL / NET: -1100 mL        PHYSICAL EXAM:  Vital Signs Last 24 Hrs  T(C): 36.4 (07 Jan 2020 13:08), Max: 36.7 (07 Jan 2020 04:33)  T(F): 97.6 (07 Jan 2020 13:08), Max: 98 (07 Jan 2020 04:33)  HR: 79 (07 Jan 2020 13:08) (65 - 79)  BP: 169/76 (07 Jan 2020 13:08) (112/61 - 169/76)  BP(mean): --  RR: 18 (07 Jan 2020 13:08) (17 - 18)  SpO2: 99% (07 Jan 2020 13:08) (97% - 100%)    CONSTITUTIONAL: NAD, well-developed, well-groomed  EYES: PERRLA; conjunctiva and sclera clear  ENMT: Moist oral mucosa, no pharyngeal injection or exudates; normal dentition  NECK: Supple, no palpable masses; no thyromegaly  RESPIRATORY: Normal respiratory effort; poor air entry, limited exam  CARDIOVASCULAR: Regular rate and rhythm, normal S1 and S2, no murmur/rub/gallop; No lower extremity edema; Peripheral pulses are 2+ bilaterally  ABDOMEN: + epigastric tenderness to palpation, +gaseous distention  MUSCLOSKELETAL:  Normal gait; no clubbing or cyanosis of digits; no joint swelling or tenderness to palpation  PSYCH: A+O to person, place, and time; affect appropriate  NEUROLOGY: +L facial droop, B/L LE weakness, +RLE contracted  SKIN: No rashes; no palpable lesions    LABS:                        8.5    12.67 )-----------( 308      ( 07 Jan 2020 09:44 )             27.8     01-07    140  |  101  |  68<H>  ----------------------------<  219<H>  4.4   |  21<L>  |  9.06<H>    Ca    9.3      07 Jan 2020 06:26  Phos  3.1     01-06  Mg     2.5     01-06            COORDINATION OF CARE:  Care Discussed with Consultants/Other Providers [Y/N]: renal attg  Prior or Outpatient Records Reviewed [Y/N]:

## 2020-01-07 NOTE — PROGRESS NOTE ADULT - ASSESSMENT
63 yo male with ESRD, HD, PAF, admitted with multiple CVA's.  Difficulty with pulmonary toilet and airway secretions, but clear CXR and CT scan.  S/P PEG  Respiratory status stable  Sptm with Enterobacter and MSSA- viewed as colonizers  Developed fever, dramatic rise in WBC  Vanco x 1  Zosyn started, changed to  Meropenem  Repeat CT of C/A/P on 1/2 no source of infection.  Urine Cx >100k Acinetobacter,? significance in HD patient  Afebrile, WBC lower, more alert- clinically improved    Suggest:  Continue Meropenem day 6/7  Follow temps and CBC/diff   D/w family at bedside 65 yo male with ESRD, HD, PAF, admitted with multiple CVA's.  Difficulty with pulmonary toilet and airway secretions, but clear CXR and CT scan.  S/P PEG  Respiratory status stable  Sptm with Enterobacter and MSSA- viewed as colonizers  Developed fever, dramatic rise in WBC  Vanco x 1  Zosyn started, changed to  Meropenem  Repeat CT of C/A/P on 1/2 no source of infection.  Urine Cx >100k Acinetobacter,? significance in HD patient  Afebrile, WBC lower, more alert- clinically improved    Suggest:  Continue Meropenem day 6/7  Follow temps and CBC/diff

## 2020-01-07 NOTE — PROGRESS NOTE ADULT - ASSESSMENT
dysphagia  cva  anemia  fever  Abdominal pain    s/p peg; tolerating feeds @ 50cc/hr  c/o pain around PEG site; No obstructive symptoms, tolerating PEG feeds. Monitor residuals. Will continue to monitor. Recommend serial abdominal exams for now. If pain does not improve, will consider further evaluation with abdominal imaging. Patient had CT abd/pelvis with PO and IV con completed on 01/02 which demonstrated no evidence of bowel obstruction and no intra-abdominal GI pathology.    cont feeds as tolerated  cbc daily  afebrile, WBC slowly improving today   ID eval noted; cont meropenem   HD per nephrology  will follow     Advanced care planning was discussed with patient and family.  Advanced care planning forms were reviewed and discussed.  Risks, benefits and alternatives of gastroenterologic procedures were discussed in detail and all questions were answered.    30 minutes spent.

## 2020-01-07 NOTE — PROGRESS NOTE ADULT - SUBJECTIVE AND OBJECTIVE BOX
Follow-up Pulm Progress Note    Seen in HD  Minimal cough per RN   99% on 4L NC    Medications:  MEDICATIONS  (STANDING):  albuterol/ipratropium for Nebulization 3 milliLiter(s) Nebulizer every 6 hours  apixaban 5 milliGRAM(s) Oral two times a day  atorvastatin 80 milliGRAM(s) Oral at bedtime  budesonide  80 MICROgram(s)/formoterol 4.5 MICROgram(s) Inhaler 2 Puff(s) Inhalation two times a day  calcium acetate 667 milliGRAM(s) Oral three times a day with meals  chlorhexidine 4% Liquid 1 Application(s) Topical <User Schedule>  clopidogrel Tablet 75 milliGRAM(s) Oral daily  dextrose 5%. 1000 milliLiter(s) (50 mL/Hr) IV Continuous <Continuous>  dextrose 50% Injectable 12.5 Gram(s) IV Push once  dextrose 50% Injectable 25 Gram(s) IV Push once  famotidine   Suspension 20 milliGRAM(s) Oral every 48 hours  fluconAZOLE   40 mG/mL Suspension 100 milliGRAM(s) Enteral Tube daily  insulin lispro (HumaLOG) corrective regimen sliding scale   SubCutaneous every 6 hours  insulin NPH human recombinant 10 Unit(s) SubCutaneous every 6 hours  lacosamide Solution 50 milliGRAM(s) Oral two times a day  lactobacillus acidophilus 1 Tablet(s) Oral daily  levETIRAcetam  Solution 250 milliGRAM(s) Oral two times a day  levETIRAcetam  Solution 125 milliGRAM(s) Oral daily  levothyroxine 50 MICROGram(s) Oral daily  meropenem  IVPB 500 milliGRAM(s) IV Intermittent every 24 hours  midodrine 5 milliGRAM(s) Oral every 8 hours    MEDICATIONS  (PRN):  bisacodyl Suppository 10 milliGRAM(s) Rectal daily PRN Constipation  glucagon  Injectable 1 milliGRAM(s) IntraMuscular once PRN Glucose <70 milliGRAM(s)/deciLiter          Vital Signs Last 24 Hrs  T(C): 36.5 (07 Jan 2020 09:48), Max: 36.7 (07 Jan 2020 04:33)  T(F): 97.7 (07 Jan 2020 09:48), Max: 98 (07 Jan 2020 04:33)  HR: 69 (07 Jan 2020 09:48) (65 - 74)  BP: 122/63 (07 Jan 2020 09:48) (112/61 - 129/68)  BP(mean): --  RR: 17 (07 Jan 2020 09:48) (17 - 18)  SpO2: 100% (07 Jan 2020 09:48) (97% - 100%) on 4L NC           01-06 @ 07:01  -  01-07 @ 07:00  --------------------------------------------------------  IN: 0 mL / OUT: 0 mL / NET: 0 mL          LABS:                        8.5    12.67 )-----------( 308      ( 07 Jan 2020 09:44 )             27.8     01-07    140  |  101  |  68<H>  ----------------------------<  219<H>  4.4   |  21<L>  |  9.06<H>    Ca    9.3      07 Jan 2020 06:26  Phos  3.1     01-06  Mg     2.5     01-06            CAPILLARY BLOOD GLUCOSE      POCT Blood Glucose.: 92 mg/dL (07 Jan 2020 12:07)                        CULTURES: (if applicable)  Culture Results:   Normal Respiratory Rachel present (01-03 @ 22:37)  Culture Results:   No growth to date. (01-03 @ 10:32)  Culture Results:   No growth to date. (01-03 @ 10:32)  Culture Results:   >100,000 CFU/ml Acinetobacter baumannii complex (01-02 @ 23:06)  Culture Results:   No growth at 5 days. (01-02 @ 04:04)  Culture Results:   No growth at 5 days. (01-02 @ 00:54)    Most recent blood culture -- 01-03 @ 22:37   -- -- .Sputum Sputum 01-03 @ 22:37  Most recent blood culture -- 01-03 @ 10:32   -- -- .Blood Blood-Peripheral 01-03 @ 10:32  Most recent blood culture -- 01-02 @ 23:06   Acinetobacter baumannii Acinetobacter baumannii .Urine Clean Catch (Midstream) 01-02 @ 23:06    Blood culture 01-03 @ 22:37  --    Few polymorphonuclear leukocytes per low power field  Moderate Squamous epithelial cells per low power field  Rare Gram Variable Cocci seen per oil power field  Few Gram Variable Rods seen per oil power field  --  --  --    Urine culture    -->  Blood culture 01-02 @ 23:06  --  --  TALITA  Acinetobacter baumannii  Acinetobacter baumannii    Urine culture    -->      Physical Examination:  PULM: Decreased BS, grossly clear  CVS: S1, S2 heard    RADIOLOGY REVIEWED  CXR: 1/4: Pulm edema

## 2020-01-07 NOTE — PROGRESS NOTE ADULT - SUBJECTIVE AND OBJECTIVE BOX
INTERVAL HPI/OVERNIGHT EVENTS:    patient seen and examined at bedside with family present   patient is more alert, interactive today   s/p HD today  admits to 5/10 non-radiating pain around PEG site   tolerating tube feeds @ 50cc/hr  denies n/v/d/c   No BMs today     Allergies    No Known Allergies    Intolerances    General:  No wt loss, fevers, chills, night sweats, fatigue,   Eyes:  Good vision, no reported pain  ENT:  No sore throat, pain, runny nose, dysphagia  CV:  No pain, palpitations, hypo/hypertension  Resp:  No dyspnea, cough, tachypnea, wheezing  GI:  +epigastric pain around PEG site, No nausea, No vomiting, No diarrhea, No constipation, No weight loss, No fever, No pruritis, No rectal bleeding, No tarry stools, No dysphagia,  :  No pain, bleeding, incontinence, nocturia  Muscle:  No pain, weakness  Neuro:  No weakness, tingling, memory problems  Psych:  No fatigue, insomnia, mood problems, depression  Endocrine:  No polyuria, polydipsia, cold/heat intolerance  Heme:  No petechiae, ecchymosis, easy bruisability  Skin:  No rash, tattoos, scars, edema      PHYSICAL EXAM:   Vital Signs:  Vital Signs Last 24 Hrs  T(C): 37 (05 Jan 2020 15:00), Max: 37 (04 Jan 2020 23:00)  T(F): 98.6 (05 Jan 2020 15:00), Max: 98.6 (04 Jan 2020 23:00)  HR: 82 (05 Jan 2020 15:00) (75 - 99)  BP: 141/72 (05 Jan 2020 15:00) (115/56 - 147/70)  BP(mean): 93 (05 Jan 2020 15:00) (71 - 105)  RR: 18 (05 Jan 2020 15:00) (14 - 28)  SpO2: 100% (05 Jan 2020 15:00) (94% - 100%)  Daily     Daily I&O's Summary    04 Jan 2020 07:01  -  05 Jan 2020 07:00  --------------------------------------------------------  IN: 2810 mL / OUT: 1000 mL / NET: 1810 mL    05 Jan 2020 07:01  -  05 Jan 2020 16:23  --------------------------------------------------------  IN: 680 mL / OUT: 0 mL / NET: 680 mL        GENERAL:  NAD   HEENT:  NC/AT  ABDOMEN:  Soft, non-tender, +distended, normoactive bowel sounds, +PEG c/d/i  EXTEREMITIES:  no cyanosis,clubbing or edema  SKIN:  No rash/erythema/ecchymoses/petechiae/wounds/abscess/warm/dry  NEURO:  Alert, responsive       LABS:                        8.9    20.01 )-----------( 377      ( 04 Jan 2020 22:51 )             28.1     01-04    136  |  97  |  33<H>  ----------------------------<  211<H>  4.1   |  22  |  4.41<H>    Ca    9.4      04 Jan 2020 22:51  Phos  2.8     01-04  Mg     2.2     01-04      PT/INR - ( 03 Jan 2020 23:04 )   PT: 19.0 sec;   INR: 1.64 ratio         PTT - ( 03 Jan 2020 23:04 )  PTT:34.3 sec    amylase   lipase  RADIOLOGY & ADDITIONAL TESTS:

## 2020-01-08 ENCOUNTER — APPOINTMENT (OUTPATIENT)
Dept: CARDIOLOGY | Facility: CLINIC | Age: 65
End: 2020-01-08

## 2020-01-08 LAB
ALBUMIN SERPL ELPH-MCNC: 2.4 G/DL — LOW (ref 3.3–5)
ALP SERPL-CCNC: 96 U/L — SIGNIFICANT CHANGE UP (ref 40–120)
ALT FLD-CCNC: 13 U/L — SIGNIFICANT CHANGE UP (ref 10–45)
ANION GAP SERPL CALC-SCNC: 16 MMOL/L — SIGNIFICANT CHANGE UP (ref 5–17)
AST SERPL-CCNC: 33 U/L — SIGNIFICANT CHANGE UP (ref 10–40)
BILIRUB SERPL-MCNC: 0.3 MG/DL — SIGNIFICANT CHANGE UP (ref 0.2–1.2)
BUN SERPL-MCNC: 35 MG/DL — HIGH (ref 7–23)
CALCIUM SERPL-MCNC: 8.9 MG/DL — SIGNIFICANT CHANGE UP (ref 8.4–10.5)
CHLORIDE SERPL-SCNC: 97 MMOL/L — SIGNIFICANT CHANGE UP (ref 96–108)
CO2 SERPL-SCNC: 27 MMOL/L — SIGNIFICANT CHANGE UP (ref 22–31)
CREAT SERPL-MCNC: 5.62 MG/DL — HIGH (ref 0.5–1.3)
CULTURE RESULTS: SIGNIFICANT CHANGE UP
CULTURE RESULTS: SIGNIFICANT CHANGE UP
GLUCOSE SERPL-MCNC: 60 MG/DL — LOW (ref 70–99)
HCT VFR BLD CALC: 27.6 % — LOW (ref 39–50)
HGB BLD-MCNC: 8.4 G/DL — LOW (ref 13–17)
MCHC RBC-ENTMCNC: 29 PG — SIGNIFICANT CHANGE UP (ref 27–34)
MCHC RBC-ENTMCNC: 30.4 GM/DL — LOW (ref 32–36)
MCV RBC AUTO: 95.2 FL — SIGNIFICANT CHANGE UP (ref 80–100)
PLATELET # BLD AUTO: 344 K/UL — SIGNIFICANT CHANGE UP (ref 150–400)
POTASSIUM SERPL-MCNC: 3.5 MMOL/L — SIGNIFICANT CHANGE UP (ref 3.5–5.3)
POTASSIUM SERPL-SCNC: 3.5 MMOL/L — SIGNIFICANT CHANGE UP (ref 3.5–5.3)
PROT SERPL-MCNC: 6.9 G/DL — SIGNIFICANT CHANGE UP (ref 6–8.3)
RBC # BLD: 2.9 M/UL — LOW (ref 4.2–5.8)
RBC # FLD: 15.4 % — HIGH (ref 10.3–14.5)
SODIUM SERPL-SCNC: 140 MMOL/L — SIGNIFICANT CHANGE UP (ref 135–145)
SPECIMEN SOURCE: SIGNIFICANT CHANGE UP
SPECIMEN SOURCE: SIGNIFICANT CHANGE UP
WBC # BLD: 10.56 K/UL — HIGH (ref 3.8–10.5)
WBC # FLD AUTO: 10.56 K/UL — HIGH (ref 3.8–10.5)

## 2020-01-08 PROCEDURE — 71045 X-RAY EXAM CHEST 1 VIEW: CPT | Mod: 26

## 2020-01-08 PROCEDURE — 74018 RADEX ABDOMEN 1 VIEW: CPT | Mod: 26

## 2020-01-08 PROCEDURE — 99232 SBSQ HOSP IP/OBS MODERATE 35: CPT

## 2020-01-08 RX ORDER — PANTOPRAZOLE SODIUM 20 MG/1
40 TABLET, DELAYED RELEASE ORAL ONCE
Refills: 0 | Status: COMPLETED | OUTPATIENT
Start: 2020-01-08 | End: 2020-01-08

## 2020-01-08 RX ORDER — CARVEDILOL PHOSPHATE 80 MG/1
3.12 CAPSULE, EXTENDED RELEASE ORAL EVERY 12 HOURS
Refills: 0 | Status: DISCONTINUED | OUTPATIENT
Start: 2020-01-08 | End: 2020-01-08

## 2020-01-08 RX ORDER — INSULIN LISPRO 100/ML
VIAL (ML) SUBCUTANEOUS EVERY 6 HOURS
Refills: 0 | Status: DISCONTINUED | OUTPATIENT
Start: 2020-01-08 | End: 2020-01-09

## 2020-01-08 RX ORDER — DEXTROSE 50 % IN WATER 50 %
15 SYRINGE (ML) INTRAVENOUS ONCE
Refills: 0 | Status: DISCONTINUED | OUTPATIENT
Start: 2020-01-08 | End: 2020-01-14

## 2020-01-08 RX ORDER — DEXTROSE 50 % IN WATER 50 %
25 SYRINGE (ML) INTRAVENOUS ONCE
Refills: 0 | Status: COMPLETED | OUTPATIENT
Start: 2020-01-08 | End: 2020-01-08

## 2020-01-08 RX ORDER — LANOLIN ALCOHOL/MO/W.PET/CERES
3 CREAM (GRAM) TOPICAL AT BEDTIME
Refills: 0 | Status: DISCONTINUED | OUTPATIENT
Start: 2020-01-08 | End: 2020-01-08

## 2020-01-08 RX ORDER — INSULIN GLARGINE 100 [IU]/ML
8 INJECTION, SOLUTION SUBCUTANEOUS AT BEDTIME
Refills: 0 | Status: DISCONTINUED | OUTPATIENT
Start: 2020-01-08 | End: 2020-01-09

## 2020-01-08 RX ADMIN — SIMETHICONE 80 MILLIGRAM(S): 80 TABLET, CHEWABLE ORAL at 17:59

## 2020-01-08 RX ADMIN — Medication 3 MILLILITER(S): at 13:07

## 2020-01-08 RX ADMIN — Medication 3 MILLILITER(S): at 21:53

## 2020-01-08 RX ADMIN — APIXABAN 5 MILLIGRAM(S): 2.5 TABLET, FILM COATED ORAL at 05:55

## 2020-01-08 RX ADMIN — LACOSAMIDE 50 MILLIGRAM(S): 50 TABLET ORAL at 05:53

## 2020-01-08 RX ADMIN — LEVETIRACETAM 250 MILLIGRAM(S): 250 TABLET, FILM COATED ORAL at 17:58

## 2020-01-08 RX ADMIN — Medication 667 MILLIGRAM(S): at 13:07

## 2020-01-08 RX ADMIN — SIMETHICONE 80 MILLIGRAM(S): 80 TABLET, CHEWABLE ORAL at 05:55

## 2020-01-08 RX ADMIN — MIDODRINE HYDROCHLORIDE 5 MILLIGRAM(S): 2.5 TABLET ORAL at 05:55

## 2020-01-08 RX ADMIN — MIDODRINE HYDROCHLORIDE 5 MILLIGRAM(S): 2.5 TABLET ORAL at 13:08

## 2020-01-08 RX ADMIN — MEROPENEM 100 MILLIGRAM(S): 1 INJECTION INTRAVENOUS at 17:59

## 2020-01-08 RX ADMIN — Medication 3 MILLILITER(S): at 05:56

## 2020-01-08 RX ADMIN — CLOPIDOGREL BISULFATE 75 MILLIGRAM(S): 75 TABLET, FILM COATED ORAL at 13:07

## 2020-01-08 RX ADMIN — Medication 50 MICROGRAM(S): at 05:55

## 2020-01-08 RX ADMIN — Medication 667 MILLIGRAM(S): at 05:55

## 2020-01-08 RX ADMIN — LACOSAMIDE 50 MILLIGRAM(S): 50 TABLET ORAL at 17:58

## 2020-01-08 RX ADMIN — ATORVASTATIN CALCIUM 80 MILLIGRAM(S): 80 TABLET, FILM COATED ORAL at 21:51

## 2020-01-08 RX ADMIN — Medication 25 GRAM(S): at 05:46

## 2020-01-08 RX ADMIN — APIXABAN 5 MILLIGRAM(S): 2.5 TABLET, FILM COATED ORAL at 17:59

## 2020-01-08 RX ADMIN — FLUCONAZOLE 100 MILLIGRAM(S): 150 TABLET ORAL at 13:07

## 2020-01-08 RX ADMIN — Medication 667 MILLIGRAM(S): at 21:52

## 2020-01-08 RX ADMIN — Medication 1 TABLET(S): at 13:07

## 2020-01-08 RX ADMIN — Medication 6: at 18:00

## 2020-01-08 RX ADMIN — INSULIN GLARGINE 8 UNIT(S): 100 INJECTION, SOLUTION SUBCUTANEOUS at 21:52

## 2020-01-08 RX ADMIN — CHLORHEXIDINE GLUCONATE 1 APPLICATION(S): 213 SOLUTION TOPICAL at 22:00

## 2020-01-08 RX ADMIN — LEVETIRACETAM 250 MILLIGRAM(S): 250 TABLET, FILM COATED ORAL at 05:54

## 2020-01-08 RX ADMIN — Medication 3 MILLILITER(S): at 17:58

## 2020-01-08 NOTE — PROGRESS NOTE ADULT - PROBLEM SELECTOR PLAN 8
Pt with intermittent hypoglycemic episodes. Was previously on levemir 8 units and premeal 3 tidwm prior to admission  - will dc NPH insulin  - start lantus 8 units qhs  - start MDSS with fingerstick monitoring q6 hours as pt is on 24 hour feeds  - adjust above regimen as necessary    # abd pain- likely 2/2 gas as improved after gas-x  - abd xray showing nonspecific bowel gas pattern, no obstruction or ileus suspected  - continue gas-x PRN  - serial abd exams

## 2020-01-08 NOTE — PROGRESS NOTE ADULT - PROBLEM SELECTOR PLAN 1
Based on work up thus far, likely secondary to complicated UTI  Clinically improving and leukocytosis appropriately downtrending  UCx: Acinetobacter (Pansensitive)  BCx: NGTD, RVP Negative, CT A/P unrevealing for infectious source  - Complete Meropenem x 7 total days  - ID following, will appreciate recommendations  - wean dc midodrine as pt hypertensive

## 2020-01-08 NOTE — PROGRESS NOTE ADULT - PROBLEM SELECTOR PLAN 10
Transitions of Care Status:  1.  Name of PCP: Jamie Corral  2.  PCP Contacted on Admission: [x] Y    [ ] N ***Contacted 1/6/2020  , 1/8  3.  PCP contacted at Discharge: [ ] Y    [ ] N    [ ] N/A  4.  Post-Discharge Appointment Date and Location:  5.  Summary of Handoff given to PCP: Emailed Dr. Ayala re: hospital course and dispo plans

## 2020-01-08 NOTE — PROGRESS NOTE ADULT - SUBJECTIVE AND OBJECTIVE BOX
CC: f/u for fever    Patient is more alert, responsive, no distress  no fevers noted    REVIEW OF SYSTEMS:  limited by underlying condition.    Antimicrobials Day #6, Meropenem  fluconAZOLE   40 mG/mL Suspension 100 milliGRAM(s) Enteral Tube daily  meropenem  IVPB 500 milliGRAM(s) IV Intermittent every 24 hours        Vital Signs Last 24 Hrs  T(C): 36.4 (08 Jan 2020 08:08), Max: 36.8 (07 Jan 2020 13:50)  T(F): 97.6 (08 Jan 2020 08:08), Max: 98.3 (07 Jan 2020 16:00)  HR: 73 (08 Jan 2020 08:08) (67 - 79)  BP: 172/79 (08 Jan 2020 08:08) (134/70 - 172/79)  BP(mean): --  RR: 18 (08 Jan 2020 08:08) (18 - 18)  SpO2: 98% (08 Jan 2020 08:08) (95% - 99%)    PHYSICAL EXAM:  General: no acute distress  Eyes:  anicteric, no conjunctival injection, no discharge  Oropharynx: no lesions or injection 	  Neck: supple, without adenopathy  R HD cath site clean  Lungs: ant rhonchi, diminished at bases  Heart: regular rate and rhythm; no murmur, rubs or gallops  Abdomen: soft, distended, nontender, G tube site clean  Skin: no lesions  Extremities: no edema  Neurologic: more alert, interactive    LAB RESULTS:                                                8.4    10.56 )-----------( 344      ( 08 Jan 2020 08:55 )             27.6   01-08    140  |  97  |  35<H>  ----------------------------<  60<L>  3.5   |  27  |  5.62<H>    Ca    8.9      08 Jan 2020 05:58    TPro  6.9  /  Alb  2.4<L>  /  TBili  0.3  /  DBili  x   /  AST  33  /  ALT  13  /  AlkPhos  96  01-08                  MICROBIOLOGY:  RECENT CULTURES:  Culture - Sputum . (01.03.20 @ 22:37)    Gram Stain:   Few polymorphonuclear leukocytes per low power field  Moderate Squamous epithelial cells per low power field  Rare Gram Variable Cocci seen per oil power field  Few Gram Variable Rods seen per oil power field    Specimen Source: .Sputum Sputum    Culture Results:   Normal Respiratory Rachel present    01-02 @ 04:04 .Blood Blood-Peripheral     No growth to date.    Culture - Urine (01.02.20 @ 23:06)    Specimen Source: .Urine Clean Catch (Midstream)    Culture Results:   >100,000 CFU/ml Gram Negative Rods  Culture - Urine (01.02.20 @ 23:06)    -  Trimethoprim/Sulfamethoxazole: S <=2/38    -  Tobramycin: S <=4    -  Meropenem: S <=1    -  Levofloxacin: S <=2    -  Gentamicin: S <=4    -  Ciprofloxacin: S <=1    -  Ceftriaxone: S 8    -  Ceftazidime: S 4    -  Amikacin: S <=16    -  Ampicillin/Sulbactam: S <=8/4    -  Cefepime: S <=4    Specimen Source: .Urine Clean Catch (Midstream)    Culture Results:   >100,000 CFU/ml Acinetobacter baumannii complex  Culture in progress    Organism Identification: Acinetobacter baumannii    Organism: Acinetobacter baumannii    Method Type: [facility]    RADIOLOGY REVIEWED:  Xray Chest 1 View- PORTABLE-Urgent (01.04.20 @ 11:48) >  Since 1/2/20 exam, new, mild pulmonary vascular congestive changes have developed.    CT Abdomen and Pelvis w/ Oral Cont and w/ IV Cont (01.02.20 @ 16:50) >  No bowel obstruction.  No CT evidence of acute intra-abdominal or intrathoracic pathology.    < from: CT Head No Cont (01.05.20 @ 08:51) >  Unchanged volume loss, redemonstration multifocal evolving ischemic change less pronounced foci of increased attenuation likely resolving contrast staining,  petechial and subarachnoid hemorrhage not excluded. No new large hemorrhage or midline shift.    < end of copied text >  < from: Xray Abdomen 1 View PORTABLE -Routine (01.08.20 @ 08:43) >  IMPRESSION: Nonobstructive bowel gas pattern.    < end of copied text >

## 2020-01-08 NOTE — PROGRESS NOTE ADULT - PROBLEM SELECTOR PLAN 4
S/p CARLA in June 2019  TTE in 12/2019 shows normal LVSF and trace MR.   Significant tropninemia in the setting of septic shock, likely type II; Down-trending   - Continue Plavix in setting of history of CAD and in setting of carotid stenosis.   - Continue Atorvastatin  - resume coreg as tolerated

## 2020-01-08 NOTE — CHART NOTE - NSCHARTNOTEFT_GEN_A_CORE
MEDICINE NP    CHAD DUNBAR  64y Male    Patient is a 64y old  Male who presents with a chief complaint of Seizures (07 Jan 2020 15:47)       > Event Summary:  Notified by RN, Patient with hypoglycemia, BG 62/ 57.  RN denies Patient with diarrhea, N/V, or feeding residual.   Vital Signs :  T(C): 36.8  T(F): 98.2  HR: 76 BP: 148/62 RR: 18 SpO2: 95% (08 Jan 2020 04:30)      Patient seen at beside, awake and alert, NAD.  Abdomen w/ +BS x2 quad, soft and non-tender,   -s/p Hypoglycemia Protocol for repeat BG 1555/ 152  -Patient on NPH 10U Q6hrs.  Will hold 6AM dose  -RN reports holding feeds x1 for concern of aspiration during care, unclear if a factor.  -C/w BG qhrs and can consider Endocrine if indicated  -AXR ordered for r/o SBO   -f/u AM CMP  -Will endorse to day provider and Attending to follow       CAPILLARY BLOOD GLUCOSE  POCT Blood Glucose.: 152 mg/dL (08 Jan 2020 06:28)  POCT Blood Glucose.: 155 mg/dL (08 Jan 2020 06:12)  POCT Blood Glucose.: 62 mg/dL (08 Jan 2020 05:39)  POCT Blood Glucose.: 57 mg/dL (08 Jan 2020 05:35)  POCT Blood Glucose.: 137 mg/dL (07 Jan 2020 23:12)  POCT Blood Glucose.: 181 mg/dL (07 Jan 2020 18:09)  POCT Blood Glucose.: 119 mg/dL (07 Jan 2020 13:48)  POCT Blood Glucose.: 92 mg/dL (07 Jan 2020 12:07)  POCT Blood Glucose.: 73 mg/dL (07 Jan 2020 09:32)        ALVA Galvan-BC  Medicine Department  #[id number]

## 2020-01-08 NOTE — PROGRESS NOTE ADULT - PROBLEM SELECTOR PLAN 1
Minimal cough per staff  -Mostly likely 2nd volume overload +/- micro-aspiration   -CXR with grossly unchanged bilateral patchy opacities most likely representing pulm edema   -CT chest 12/24 and 1/2 are negative for PNA  -RVP negative  -Duoneb q6

## 2020-01-08 NOTE — PROVIDER CONTACT NOTE (OTHER) - ASSESSMENT
12am 
NEUROLOGICALLY SAME
Pt A&Ox3, no pain, pupils round and reactive, Asymptomatic
Pt A&Ox4, 95%O2 bp 129/107, with a non productive dry cough
Pt says he is lightheaded when questioned. Pt on tube feedings (50cc/hr Nepro held for an hr due to persistent cough)
neurologically same
neurologivally same. followed commands. alert.
oral temp 100.7F
Pt a/ox3. VSS. Pt denies chest pain, dizziness and SOB. Pt is asymptomatic at this time.

## 2020-01-08 NOTE — PROGRESS NOTE ADULT - SUBJECTIVE AND OBJECTIVE BOX
Follow-up Pulm Progress Note    No new respiratory events overnight.  Denies SOB/CP.   Intermittent cough, improved per son   Alert, interactive, oriented x2  93% on RA    Medications:  MEDICATIONS  (STANDING):  albuterol/ipratropium for Nebulization 3 milliLiter(s) Nebulizer every 6 hours  apixaban 5 milliGRAM(s) Oral two times a day  atorvastatin 80 milliGRAM(s) Oral at bedtime  calcium acetate 667 milliGRAM(s) Oral three times a day with meals  chlorhexidine 4% Liquid 1 Application(s) Topical <User Schedule>  clopidogrel Tablet 75 milliGRAM(s) Oral daily  dextrose 5%. 1000 milliLiter(s) (50 mL/Hr) IV Continuous <Continuous>  dextrose 50% Injectable 12.5 Gram(s) IV Push once  dextrose 50% Injectable 25 Gram(s) IV Push once  famotidine   Suspension 20 milliGRAM(s) Oral every 48 hours  fluconAZOLE   40 mG/mL Suspension 100 milliGRAM(s) Enteral Tube daily  insulin glargine Injectable (LANTUS) 8 Unit(s) SubCutaneous at bedtime  insulin lispro (HumaLOG) corrective regimen sliding scale   SubCutaneous every 6 hours  lacosamide Solution 50 milliGRAM(s) Oral two times a day  lactobacillus acidophilus 1 Tablet(s) Oral daily  levETIRAcetam  Solution 250 milliGRAM(s) Oral two times a day  levETIRAcetam  Solution 125 milliGRAM(s) Oral daily  levothyroxine 50 MICROGram(s) Oral daily  meropenem  IVPB 500 milliGRAM(s) IV Intermittent every 24 hours  midodrine 5 milliGRAM(s) Oral every 8 hours  pantoprazole  Injectable 40 milliGRAM(s) IV Push once  simethicone 80 milliGRAM(s) Chew two times a day    MEDICATIONS  (PRN):  bisacodyl Suppository 10 milliGRAM(s) Rectal daily PRN Constipation  dextrose 40% Gel 15 Gram(s) Oral once PRN Blood Glucose LESS THAN 70 milliGRAM(s)/deciliter  glucagon  Injectable 1 milliGRAM(s) IntraMuscular once PRN Glucose <70 milliGRAM(s)/deciLiter          Vital Signs Last 24 Hrs  T(C): 36.4 (08 Jan 2020 08:08), Max: 36.8 (07 Jan 2020 13:50)  T(F): 97.6 (08 Jan 2020 08:08), Max: 98.3 (07 Jan 2020 16:00)  HR: 73 (08 Jan 2020 08:08) (67 - 79)  BP: 172/79 (08 Jan 2020 08:08) (134/70 - 172/79)  BP(mean): --  RR: 18 (08 Jan 2020 08:08) (18 - 18)  SpO2: 98% (08 Jan 2020 08:08) (95% - 99%) on RA          01-07 @ 07:01  -  01-08 @ 07:00  --------------------------------------------------------  IN: 770 mL / OUT: 1100 mL / NET: -330 mL          LABS:                        8.4    10.56 )-----------( 344      ( 08 Jan 2020 08:55 )             27.6     01-08    140  |  97  |  35<H>  ----------------------------<  60<L>  3.5   |  27  |  5.62<H>    Ca    8.9      08 Jan 2020 05:58    TPro  6.9  /  Alb  2.4<L>  /  TBili  0.3  /  DBili  x   /  AST  33  /  ALT  13  /  AlkPhos  96  01-08          CAPILLARY BLOOD GLUCOSE      POCT Blood Glucose.: 152 mg/dL (08 Jan 2020 06:28)                        CULTURES: (if applicable)  Culture Results:   Normal Respiratory Rachel present (01-03 @ 22:37)  Culture Results:   No growth at 5 days. (01-03 @ 10:32)  Culture Results:   No growth at 5 days. (01-03 @ 10:32)  Culture Results:   >100,000 CFU/ml Acinetobacter baumannii complex (01-02 @ 23:06)  Culture Results:   No growth at 5 days. (01-02 @ 04:04)  Culture Results:   No growth at 5 days. (01-02 @ 00:54)    Most recent blood culture -- 01-03 @ 22:37   -- -- .Sputum Sputum 01-03 @ 22:37    Blood culture 01-03 @ 22:37  --    Few polymorphonuclear leukocytes per low power field  Moderate Squamous epithelial cells per low power field  Rare Gram Variable Cocci seen per oil power field  Few Gram Variable Rods seen per oil power field  --  --  --    Urine culture    -->      Physical Examination:  PULM: Clear to auscultation bilaterally, no significant sputum production  CVS: S1, S2 heard    RADIOLOGY REVIEWED  CXR: 1/8: Unchanged pulm edema  Fluid in R fissure

## 2020-01-08 NOTE — CHART NOTE - NSCHARTNOTEFT_GEN_A_CORE
Elevated BP reported by /79 around 15:30, now repeat /70. pt seen and examined at bedside. NAD, No neurologic changes noted, pt denies chest pain, SOB, head ache, dizziness, vision changes, any othr discomfort. Now pt is off Midodrine. Last dose of Midodrine 5 mg at noon. Now BP trending down. Will consider starting Coreg 3.125 home dose if BPs ramain elevated.     Francine Early NP-C  #[id number]

## 2020-01-08 NOTE — PROGRESS NOTE ADULT - SUBJECTIVE AND OBJECTIVE BOX
[facility] [place] [facility]   MD NINA MASON MD RUORU WONG, PA    TEL:  OFFICE: [phone]  DR HSU CELL: [phone]  RAS MORELOS CELL: [phone]  DR. MARQUEZ CELL: [phone]  DR. ALEX CELL: [phone]    FROM 5 PM - 7 AM PLEASE CALL ANSWERING SERVICE: [phone]    RENAL FOLLOW UP NOTE  --------------------------------------------------------------------------------  HPI:      Pt seen and examined at bedside.        PAST HISTORY  --------------------------------------------------------------------------------  No significant changes to PMH, PSH, FHx, SHx, unless otherwise noted    ALLERGIES & MEDICATIONS  --------------------------------------------------------------------------------  Allergies    No Known Allergies    Intolerances      Standing Inpatient Medications  albuterol/ipratropium for Nebulization 3 milliLiter(s) Nebulizer every 6 hours  apixaban 5 milliGRAM(s) Oral two times a day  atorvastatin 80 milliGRAM(s) Oral at bedtime  calcium acetate 667 milliGRAM(s) Oral three times a day with meals  chlorhexidine 4% Liquid 1 Application(s) Topical <User Schedule>  clopidogrel Tablet 75 milliGRAM(s) Oral daily  dextrose 5%. 1000 milliLiter(s) IV Continuous <Continuous>  dextrose 50% Injectable 12.5 Gram(s) IV Push once  dextrose 50% Injectable 25 Gram(s) IV Push once  famotidine   Suspension 20 milliGRAM(s) Oral every 48 hours  fluconAZOLE   40 mG/mL Suspension 100 milliGRAM(s) Enteral Tube daily  insulin lispro (HumaLOG) corrective regimen sliding scale   SubCutaneous every 6 hours  insulin NPH human recombinant 10 Unit(s) SubCutaneous every 6 hours  lacosamide Solution 50 milliGRAM(s) Oral two times a day  lactobacillus acidophilus 1 Tablet(s) Oral daily  levETIRAcetam  Solution 250 milliGRAM(s) Oral two times a day  levETIRAcetam  Solution 125 milliGRAM(s) Oral daily  levothyroxine 50 MICROGram(s) Oral daily  meropenem  IVPB 500 milliGRAM(s) IV Intermittent every 24 hours  midodrine 5 milliGRAM(s) Oral every 8 hours  pantoprazole  Injectable 40 milliGRAM(s) IV Push once  simethicone 80 milliGRAM(s) Chew two times a day    PRN Inpatient Medications  bisacodyl Suppository 10 milliGRAM(s) Rectal daily PRN  glucagon  Injectable 1 milliGRAM(s) IntraMuscular once PRN      REVIEW OF SYSTEMS  --------------------------------------------------------------------------------  General: no fever  MSK: no edema     VITALS/PHYSICAL EXAM  --------------------------------------------------------------------------------  T(C): 36.4 (01-08-20 @ 08:08), Max: 36.8 (01-07-20 @ 13:50)  HR: 73 (01-08-20 @ 08:08) (67 - 79)  BP: 172/79 (01-08-20 @ 08:08) (134/70 - 172/79)  RR: 18 (01-08-20 @ 08:08) (18 - 18)  SpO2: 98% (01-08-20 @ 08:08) (95% - 99%)  Wt(kg): --        01-07-20 @ 07:01  -  01-08-20 @ 07:00  --------------------------------------------------------  IN: 770 mL / OUT: 1100 mL / NET: -330 mL      Physical Exam:  	Gen: NAD  	HEENT: MMM  	Pulm: CTA B/L  	CV: S1S2  	Abd: Soft, +BS  	Ext: No LE edema B/L                      Neuro: Lethargic  	Skin: Warm and Dry   	Vascular access: tunnelled HD catheter     LABS/STUDIES  --------------------------------------------------------------------------------              8.4    10.56 >-----------<  344      [01-08-20 @ 08:55]              27.6     140  |  97  |  35  ----------------------------<  60      [01-08-20 @ 05:58]  3.5   |  27  |  5.62        Ca     8.9     [01-08-20 @ 05:58]    TPro  6.9  /  Alb  2.4  /  TBili  0.3  /  DBili  x   /  AST  33  /  ALT  13  /  AlkPhos  96  [01-08-20 @ 05:58]          Creatinine Trend:  SCr 5.62 [01-08 @ 05:58]  SCr 9.06 [01-07 @ 06:26]  SCr 7.36 [01-06 @ 06:26]  SCr 6.46 [01-05 @ 22:15]  SCr 4.41 [01-04 @ 22:51]    Urinalysis - [01-02-20 @ 20:34]      Color Light Yellow / Appearance Slightly Turbid / SG 1.012 / pH 7.0      Gluc 100 mg/dL / Ketone Negative  / Bili Negative / Urobili Negative       Blood Small / Protein 300 mg/dL / Leuk Est Moderate / Nitrite Negative      RBC 1 / WBC 46 / Hyaline 1 / Gran  / Sq Epi  / Non Sq Epi 0 / Bacteria Many      Iron 45, TIBC 145, %sat 31      [12-26-19 @ 23:24]  Ferritin 1369      [12-26-19 @ 23:24]  PTH -- (Ca 6.9)      [06-05-19 @ 08:14]   562  HbA1c 8.4      [12-15-19 @ 11:00]  TSH 6.22      [01-03-20 @ 10:03]  Lipid: chol 134, , HDL 56, LDL 52      [12-15-19 @ 10:56]    HBsAb >1000.0      [12-14-19 @ 23:48]  HBsAg Nonreact      [12-14-19 @ 23:48]  HCV 0.15, Nonreact      [12-14-19 @ 23:48]

## 2020-01-08 NOTE — PROGRESS NOTE ADULT - ASSESSMENT
63 y/o M with PMH of CAD s/p stents 2009 and 2019, HTN, DM, Carotid stenosis, Hypothyroidism, and ESRD on HD who presented to outside hospital with AMS. Found to have new onset Afib and bilateral extensive bihemispheric infarcts. s/p tPA at outside hospital. Hospital course c/b status epilepticus. Intubated for airway protection, extubated 12/21. Called to eval 12/26 for secretions, leukocytosis. S/p PEG placement 12/27. Hospital course c/b acute and dramatic leukocytosis on 1/1 and fevers, likely 2nd UTI. Worsened encephalopathy on 1/3, was transferred to [facility] 2nd concern for worsened stroke on imaging which was later determined to be enhanced uptake of contrast in areas of old infarct.

## 2020-01-08 NOTE — PROGRESS NOTE ADULT - PROBLEM SELECTOR PLAN 9
DVT ppx: Patient on Eliquis  Diet: has tube feeds to PEG tube  Dispo: Likely rehab if sugars stabilize and bp remains stable off midodrine- possibly friday

## 2020-01-08 NOTE — PROGRESS NOTE ADULT - PROBLEM SELECTOR PLAN 3
-UC with Acinetobacter baumannii complex  -C/w Meropenem per ID  -Blood cultures negative   -CT C/A/P without evidence of occult infection.

## 2020-01-08 NOTE — PROGRESS NOTE ADULT - SUBJECTIVE AND OBJECTIVE BOX
INTERVAL HPI/OVERNIGHT EVENTS:    overnight events noted   patient seen and examined at bedside with family present   patient is more alert, interactive today   s/p HD yesterday  reports epigastric pain has completely subsided  tolerating tube feeds @ 50cc/hr  denies n/v/d/c   No BMs today, + flatus     Allergies    No Known Allergies    Intolerances    General:  No wt loss, fevers, chills, night sweats, fatigue,   Eyes:  Good vision, no reported pain  ENT:  No sore throat, pain, runny nose, dysphagia  CV:  No pain, palpitations, hypo/hypertension  Resp:  No dyspnea, cough, tachypnea, wheezing  GI:  No pain, No nausea, No vomiting, No diarrhea, No constipation, No weight loss, No fever, No pruritis, No rectal bleeding, No tarry stools, No dysphagia,  :  No pain, bleeding, incontinence, nocturia  Muscle:  No pain, weakness  Neuro:  No weakness, tingling, memory problems  Psych:  No fatigue, insomnia, mood problems, depression  Endocrine:  No polyuria, polydipsia, cold/heat intolerance  Heme:  No petechiae, ecchymosis, easy bruisability  Skin:  No rash, tattoos, scars, edema      PHYSICAL EXAM:   Vital Signs:  Vital Signs Last 24 Hrs  T(C): 37 (05 Jan 2020 15:00), Max: 37 (04 Jan 2020 23:00)  T(F): 98.6 (05 Jan 2020 15:00), Max: 98.6 (04 Jan 2020 23:00)  HR: 82 (05 Jan 2020 15:00) (75 - 99)  BP: 141/72 (05 Jan 2020 15:00) (115/56 - 147/70)  BP(mean): 93 (05 Jan 2020 15:00) (71 - 105)  RR: 18 (05 Jan 2020 15:00) (14 - 28)  SpO2: 100% (05 Jan 2020 15:00) (94% - 100%)  Daily     Daily I&O's Summary    04 Jan 2020 07:01  -  05 Jan 2020 07:00  --------------------------------------------------------  IN: 2810 mL / OUT: 1000 mL / NET: 1810 mL    05 Jan 2020 07:01  -  05 Jan 2020 16:23  --------------------------------------------------------  IN: 680 mL / OUT: 0 mL / NET: 680 mL        GENERAL:  NAD   HEENT:  NC/AT  ABDOMEN:  Soft, non-tender, +distended, normoactive bowel sounds, +PEG c/d/i  EXTEREMITIES:  no cyanosis,clubbing or edema  SKIN:  No rash/erythema/ecchymoses/petechiae/wounds/abscess/warm/dry  NEURO:  More alert, interactive       LABS:                        8.9    20.01 )-----------( 377      ( 04 Jan 2020 22:51 )             28.1     01-04    136  |  97  |  33<H>  ----------------------------<  211<H>  4.1   |  22  |  4.41<H>    Ca    9.4      04 Jan 2020 22:51  Phos  2.8     01-04  Mg     2.2     01-04      PT/INR - ( 03 Jan 2020 23:04 )   PT: 19.0 sec;   INR: 1.64 ratio         PTT - ( 03 Jan 2020 23:04 )  PTT:34.3 sec    amylase   lipase  RADIOLOGY & ADDITIONAL TESTS:  < from: Xray Abdomen 1 View PORTABLE -Routine (01.08.20 @ 08:43) >    EXAM:  XR ABDOMEN PORTABLE ROUTINE 1V                            PROCEDURE DATE:  01/08/2020            INTERPRETATION:  CLINICAL INFORMATION: Hypoglycemia on PEG feeds. Assess for obstruction.    TECHNIQUE: Single AP graft of the abdomen 1/8/2020.    COMPARISON: Abdominal radiograph and CT chest/abdomen/pelvis 1/2/2020    FINDINGS:  Partially visualized gastrostomy tube.  Multiple air-filled loops of bowel. Nonobstructive bowel gas pattern.  No evidence of intraperitoneal free air.  No acute osseous abnormality.    IMPRESSION: Nonobstructive bowel gas pattern.                EFRAÍN GONZALEZ M.D., RADIOLOGY RESIDENT  This document has been electronically signed.  JAME SANABRIA M.D., ATTENDING RADIOLOGIST  This document has been electronically signed. Jan 8 2020  9:25AM                < end of copied text >

## 2020-01-08 NOTE — PROGRESS NOTE ADULT - ASSESSMENT
63 yo male with ESRD, HD, PAF, admitted with multiple CVA's.  Difficulty with pulmonary toilet and airway secretions, but clear CXR and CT scan.  S/P PEG  Respiratory status stable  Sptm with Enterobacter and MSSA- viewed as colonizers  Developed fever, dramatic rise in WBC  Vanco x 1  Zosyn started, changed to  Meropenem  Repeat CT of C/A/P on 1/2 no source of infection.  Urine Cx >100k Acinetobacter,? significance in HD patient  Afebrile, WBC now josh;, more alert- clinically improved    Suggest:  last dose of Meropenem day 7 today  Follow temps and CBC/diff 63 yo male with ESRD, HD, PAF, admitted with multiple CVA's.  Difficulty with pulmonary toilet and airway secretions, but clear CXR and CT scan.  S/P PEG  Respiratory status stable  Sptm with Enterobacter and MSSA- viewed as colonizers  Developed fever, dramatic rise in WBC  Vanco x 1  Zosyn started, changed to  Meropenem  Repeat CT of C/A/P on 1/2 no source of infection.  Urine Cx >100k Acinetobacter,? significance in HD patient  Afebrile, WBC now josh;, more alert- clinically improved    Suggest:  last dose of Meropenem day 7 today  Follow temps and CBC/diff   family updated at bedside

## 2020-01-08 NOTE — PROGRESS NOTE ADULT - ASSESSMENT
64 M hx of CAD s/p 2009, 2019, HTN, DM2, carotid stenosis, ESRD on HD admitted as transfer on mechanical ventilation for status epilepticus in setting of new embolic CVA At [facility], he received tPA on 12/13/19, was noted to have SAH after tPA and hyponatremia, s/p hypertonic saline, course complicated by status epilepticus and intubated for airway protection, subsequently transferred to [facility] for further management, s/p extubation, course c/b dysphagia and failed S&S eval s/p PEG tube placement, and septic shock attributed to complicated UTI and refractory cough likely from ongoing aspiration

## 2020-01-08 NOTE — PROGRESS NOTE ADULT - ASSESSMENT
63 yo male with ESRD on HD (M-W-FRI without AVF as awaiting renal transplant expected within 3 months; Dr. Barrientos; [facility]), CAD s/p stent 2009 and CARLA in June 2019, HTN, DM type 2, hypothyroidism and left carotid stenosis admitted for altered mental status during dialysis, he became unresponsive and arm not moving therefore he was sent to hospital. At [facility], he received tPA on 12/13/19. Was noted to have SAH after tPA. Started on 3% saline. Had seizure, despite multiple AEDs remained in status epilepticus and was transferred to [facility] for continuous EEG monitoring. s/p extubation, PEG tube placement. Is awake and following commands. Nephrology is following for ESRD management.    ESRD on HD TTS via tunnelled HD catheter   - HD consent in the chart  - Last HD 1/7/2020 with 1.1 L UF   -Plan for HD tomorrow  - please dose Keppra post HD on HD days      - needs rehab w/ onsite HD    Hyponatremia  - from increased free water intake  - Use minimum possible free water flushes  - should improve w/ HD    Anemia  - unable to use epogen due to CVA  - transfuse PRN Hgb <7 or hemodynamically significant bleeding  - no evidence of occult bleeding  - GI following     HTN  -BP stable  - Monitor closely    Fever   -etiology?  -s/p CT ABd   - PT also with permacath -- follow up ID

## 2020-01-08 NOTE — PROGRESS NOTE ADULT - ASSESSMENT
dysphagia  cva  anemia  fever  Abdominal pain    s/p peg; tolerating feeds @ 50cc/hr  AXR negative for bowel obstruction, intraperitoneal free air. Abdominal pain has resolved.  Patient had CT abd/pelvis with PO and IV con completed on 01/02 which demonstrated no evidence of bowel obstruction and no intra-abdominal GI pathology.    cont feeds as tolerated  cbc daily  afebrile, WBC slowly improving today   ID eval noted; cont meropenem   HD per nephrology  will follow     Advanced care planning was discussed with patient and family.  Advanced care planning forms were reviewed and discussed.  Risks, benefits and alternatives of gastroenterologic procedures were discussed in detail and all questions were answered.    30 minutes spent.

## 2020-01-08 NOTE — PROGRESS NOTE ADULT - SUBJECTIVE AND OBJECTIVE BOX
Shen De Dios MD  Division of Hospital Medicine  Pager: [phone]  If no response or off-hours, page [phone]  -------------------------------------    Patient is a 64y old  Male who presents with a chief complaint of Seizures (08 Jan 2020 13:02)      SUBJECTIVE / OVERNIGHT EVENTS: Noted to have hypoglycemic episodes overnight- asymptomatic, resolved after d50 and holding insulin  ADDITIONAL REVIEW OF SYSTEMS: Pt reports no abd pain today, no fevers/chills. Ongoing cough. Family reports pt seems much better today, and that patient himself has reported to them he feels he is receiving too much insulin.     MEDICATIONS  (STANDING):  albuterol/ipratropium for Nebulization 3 milliLiter(s) Nebulizer every 6 hours  apixaban 5 milliGRAM(s) Oral two times a day  atorvastatin 80 milliGRAM(s) Oral at bedtime  calcium acetate 667 milliGRAM(s) Oral three times a day with meals  chlorhexidine 4% Liquid 1 Application(s) Topical <User Schedule>  clopidogrel Tablet 75 milliGRAM(s) Oral daily  dextrose 5%. 1000 milliLiter(s) (50 mL/Hr) IV Continuous <Continuous>  dextrose 50% Injectable 12.5 Gram(s) IV Push once  dextrose 50% Injectable 25 Gram(s) IV Push once  famotidine   Suspension 20 milliGRAM(s) Oral every 48 hours  fluconAZOLE   40 mG/mL Suspension 100 milliGRAM(s) Enteral Tube daily  insulin glargine Injectable (LANTUS) 8 Unit(s) SubCutaneous at bedtime  insulin lispro (HumaLOG) corrective regimen sliding scale   SubCutaneous every 6 hours  lacosamide Solution 50 milliGRAM(s) Oral two times a day  lactobacillus acidophilus 1 Tablet(s) Oral daily  levETIRAcetam  Solution 250 milliGRAM(s) Oral two times a day  levETIRAcetam  Solution 125 milliGRAM(s) Oral daily  levothyroxine 50 MICROGram(s) Oral daily  meropenem  IVPB 500 milliGRAM(s) IV Intermittent every 24 hours  pantoprazole  Injectable 40 milliGRAM(s) IV Push once  simethicone 80 milliGRAM(s) Chew two times a day    MEDICATIONS  (PRN):  bisacodyl Suppository 10 milliGRAM(s) Rectal daily PRN Constipation  dextrose 40% Gel 15 Gram(s) Oral once PRN Blood Glucose LESS THAN 70 milliGRAM(s)/deciliter  glucagon  Injectable 1 milliGRAM(s) IntraMuscular once PRN Glucose <70 milliGRAM(s)/deciLiter      CAPILLARY BLOOD GLUCOSE      POCT Blood Glucose.: 152 mg/dL (08 Jan 2020 06:28)  POCT Blood Glucose.: 155 mg/dL (08 Jan 2020 06:12)  POCT Blood Glucose.: 62 mg/dL (08 Jan 2020 05:39)  POCT Blood Glucose.: 57 mg/dL (08 Jan 2020 05:35)  POCT Blood Glucose.: 137 mg/dL (07 Jan 2020 23:12)  POCT Blood Glucose.: 181 mg/dL (07 Jan 2020 18:09)    I&O's Summary    07 Jan 2020 07:01  -  08 Jan 2020 07:00  --------------------------------------------------------  IN: 770 mL / OUT: 1100 mL / NET: -330 mL        PHYSICAL EXAM:  Vital Signs Last 24 Hrs  T(C): 36.7 (08 Jan 2020 12:04), Max: 36.8 (07 Jan 2020 16:00)  T(F): 98 (08 Jan 2020 12:04), Max: 98.3 (07 Jan 2020 16:00)  HR: 71 (08 Jan 2020 12:04) (69 - 76)  BP: 160/72 (08 Jan 2020 12:04) (134/70 - 172/79)  BP(mean): --  RR: 18 (08 Jan 2020 12:04) (18 - 18)  SpO2: 94% (08 Jan 2020 12:04) (94% - 99%)  CONSTITUTIONAL: NAD, well-developed, well-groomed  EYES: PERRLA; conjunctiva and sclera clear  ENMT: Moist oral mucosa, no pharyngeal injection or exudates; normal dentition  NECK: Supple, no palpable masses; no thyromegaly  RESPIRATORY: Normal respiratory effort; decreased breath sounds bibases, intermittent gurgling/upper airway secretions  CARDIOVASCULAR: Regular rate and rhythm, normal S1 and S2, no murmur/rub/gallop; No lower extremity edema; Peripheral pulses are 2+ bilaterally  ABDOMEN: Nontender to palpation, normoactive bowel sounds, no rebound/guarding; No hepatosplenomegaly, +PEG site clean  MUSCLOSKELETAL:  Normal gait; no clubbing or cyanosis of digits; no joint swelling or tenderness to palpation  PSYCH: A+O to person, place, and time; affect appropriate  NEUROLOGY: +mild L facial droop, +BL LE weakness, +dysarthria  SKIN: No rashes; no palpable lesions    LABS:                        8.4    10.56 )-----------( 344      ( 08 Jan 2020 08:55 )             27.6     01-08    140  |  97  |  35<H>  ----------------------------<  60<L>  3.5   |  27  |  5.62<H>    Ca    8.9      08 Jan 2020 05:58    TPro  6.9  /  Alb  2.4<L>  /  TBili  0.3  /  DBili  x   /  AST  33  /  ALT  13  /  AlkPhos  96  01-08

## 2020-01-09 LAB
ANION GAP SERPL CALC-SCNC: 18 MMOL/L — HIGH (ref 5–17)
BUN SERPL-MCNC: 46 MG/DL — HIGH (ref 7–23)
CALCIUM SERPL-MCNC: 9 MG/DL — SIGNIFICANT CHANGE UP (ref 8.4–10.5)
CHLORIDE SERPL-SCNC: 96 MMOL/L — SIGNIFICANT CHANGE UP (ref 96–108)
CO2 SERPL-SCNC: 23 MMOL/L — SIGNIFICANT CHANGE UP (ref 22–31)
CREAT SERPL-MCNC: 7.14 MG/DL — HIGH (ref 0.5–1.3)
GLUCOSE SERPL-MCNC: 234 MG/DL — HIGH (ref 70–99)
HCT VFR BLD CALC: 32.4 % — LOW (ref 39–50)
HGB BLD-MCNC: 9.8 G/DL — LOW (ref 13–17)
MCHC RBC-ENTMCNC: 28.7 PG — SIGNIFICANT CHANGE UP (ref 27–34)
MCHC RBC-ENTMCNC: 30.2 GM/DL — LOW (ref 32–36)
MCV RBC AUTO: 94.7 FL — SIGNIFICANT CHANGE UP (ref 80–100)
PLATELET # BLD AUTO: 355 K/UL — SIGNIFICANT CHANGE UP (ref 150–400)
POTASSIUM SERPL-MCNC: 3.8 MMOL/L — SIGNIFICANT CHANGE UP (ref 3.5–5.3)
POTASSIUM SERPL-SCNC: 3.8 MMOL/L — SIGNIFICANT CHANGE UP (ref 3.5–5.3)
RBC # BLD: 3.42 M/UL — LOW (ref 4.2–5.8)
RBC # FLD: 15.1 % — HIGH (ref 10.3–14.5)
SODIUM SERPL-SCNC: 137 MMOL/L — SIGNIFICANT CHANGE UP (ref 135–145)
WBC # BLD: 8.99 K/UL — SIGNIFICANT CHANGE UP (ref 3.8–10.5)
WBC # FLD AUTO: 8.99 K/UL — SIGNIFICANT CHANGE UP (ref 3.8–10.5)

## 2020-01-09 PROCEDURE — 99233 SBSQ HOSP IP/OBS HIGH 50: CPT

## 2020-01-09 RX ORDER — INSULIN GLARGINE 100 [IU]/ML
12 INJECTION, SOLUTION SUBCUTANEOUS AT BEDTIME
Refills: 0 | Status: DISCONTINUED | OUTPATIENT
Start: 2020-01-09 | End: 2020-01-14

## 2020-01-09 RX ORDER — INSULIN GLARGINE 100 [IU]/ML
14 INJECTION, SOLUTION SUBCUTANEOUS AT BEDTIME
Refills: 0 | Status: DISCONTINUED | OUTPATIENT
Start: 2020-01-09 | End: 2020-01-09

## 2020-01-09 RX ORDER — CARVEDILOL PHOSPHATE 80 MG/1
3.12 CAPSULE, EXTENDED RELEASE ORAL EVERY 12 HOURS
Refills: 0 | Status: DISCONTINUED | OUTPATIENT
Start: 2020-01-09 | End: 2020-01-10

## 2020-01-09 RX ORDER — ACETAMINOPHEN 500 MG
650 TABLET ORAL EVERY 6 HOURS
Refills: 0 | Status: DISCONTINUED | OUTPATIENT
Start: 2020-01-09 | End: 2020-01-14

## 2020-01-09 RX ORDER — INSULIN LISPRO 100/ML
VIAL (ML) SUBCUTANEOUS AT BEDTIME
Refills: 0 | Status: DISCONTINUED | OUTPATIENT
Start: 2020-01-09 | End: 2020-01-10

## 2020-01-09 RX ORDER — INSULIN LISPRO 100/ML
VIAL (ML) SUBCUTANEOUS
Refills: 0 | Status: DISCONTINUED | OUTPATIENT
Start: 2020-01-09 | End: 2020-01-10

## 2020-01-09 RX ADMIN — Medication 667 MILLIGRAM(S): at 17:28

## 2020-01-09 RX ADMIN — LACOSAMIDE 50 MILLIGRAM(S): 50 TABLET ORAL at 17:27

## 2020-01-09 RX ADMIN — CARVEDILOL PHOSPHATE 3.12 MILLIGRAM(S): 80 CAPSULE, EXTENDED RELEASE ORAL at 17:29

## 2020-01-09 RX ADMIN — LEVETIRACETAM 250 MILLIGRAM(S): 250 TABLET, FILM COATED ORAL at 05:12

## 2020-01-09 RX ADMIN — FLUCONAZOLE 100 MILLIGRAM(S): 150 TABLET ORAL at 18:34

## 2020-01-09 RX ADMIN — SIMETHICONE 80 MILLIGRAM(S): 80 TABLET, CHEWABLE ORAL at 05:08

## 2020-01-09 RX ADMIN — ATORVASTATIN CALCIUM 80 MILLIGRAM(S): 80 TABLET, FILM COATED ORAL at 23:31

## 2020-01-09 RX ADMIN — SIMETHICONE 80 MILLIGRAM(S): 80 TABLET, CHEWABLE ORAL at 17:28

## 2020-01-09 RX ADMIN — Medication 3 MILLILITER(S): at 17:29

## 2020-01-09 RX ADMIN — Medication 50 MICROGRAM(S): at 04:58

## 2020-01-09 RX ADMIN — FAMOTIDINE 20 MILLIGRAM(S): 10 INJECTION INTRAVENOUS at 18:34

## 2020-01-09 RX ADMIN — LEVETIRACETAM 250 MILLIGRAM(S): 250 TABLET, FILM COATED ORAL at 17:27

## 2020-01-09 RX ADMIN — Medication 3 MILLILITER(S): at 05:08

## 2020-01-09 RX ADMIN — Medication 3 MILLILITER(S): at 11:44

## 2020-01-09 RX ADMIN — LACOSAMIDE 50 MILLIGRAM(S): 50 TABLET ORAL at 05:05

## 2020-01-09 RX ADMIN — Medication 667 MILLIGRAM(S): at 04:59

## 2020-01-09 RX ADMIN — INSULIN GLARGINE 12 UNIT(S): 100 INJECTION, SOLUTION SUBCUTANEOUS at 23:42

## 2020-01-09 RX ADMIN — Medication 667 MILLIGRAM(S): at 23:31

## 2020-01-09 RX ADMIN — Medication 1: at 23:46

## 2020-01-09 RX ADMIN — APIXABAN 5 MILLIGRAM(S): 2.5 TABLET, FILM COATED ORAL at 04:58

## 2020-01-09 RX ADMIN — CHLORHEXIDINE GLUCONATE 1 APPLICATION(S): 213 SOLUTION TOPICAL at 23:33

## 2020-01-09 RX ADMIN — CLOPIDOGREL BISULFATE 75 MILLIGRAM(S): 75 TABLET, FILM COATED ORAL at 17:28

## 2020-01-09 RX ADMIN — LEVETIRACETAM 125 MILLIGRAM(S): 250 TABLET, FILM COATED ORAL at 17:30

## 2020-01-09 RX ADMIN — Medication 4: at 05:08

## 2020-01-09 RX ADMIN — Medication 6: at 11:52

## 2020-01-09 RX ADMIN — Medication 3 MILLILITER(S): at 23:30

## 2020-01-09 RX ADMIN — Medication 1 TABLET(S): at 17:28

## 2020-01-09 RX ADMIN — APIXABAN 5 MILLIGRAM(S): 2.5 TABLET, FILM COATED ORAL at 17:29

## 2020-01-09 RX ADMIN — CARVEDILOL PHOSPHATE 3.12 MILLIGRAM(S): 80 CAPSULE, EXTENDED RELEASE ORAL at 08:45

## 2020-01-09 NOTE — PROGRESS NOTE ADULT - SUBJECTIVE AND OBJECTIVE BOX
Follow-up Pulm Progress Note    No new respiratory events overnight.  Denies SOB/CP.   99% on 2L NC  Minimal cough     Medications:  MEDICATIONS  (STANDING):  albuterol/ipratropium for Nebulization 3 milliLiter(s) Nebulizer every 6 hours  apixaban 5 milliGRAM(s) Oral two times a day  atorvastatin 80 milliGRAM(s) Oral at bedtime  calcium acetate 667 milliGRAM(s) Oral three times a day with meals  carvedilol 3.125 milliGRAM(s) Oral every 12 hours  chlorhexidine 4% Liquid 1 Application(s) Topical <User Schedule>  clopidogrel Tablet 75 milliGRAM(s) Oral daily  dextrose 5%. 1000 milliLiter(s) (50 mL/Hr) IV Continuous <Continuous>  dextrose 50% Injectable 12.5 Gram(s) IV Push once  dextrose 50% Injectable 25 Gram(s) IV Push once  famotidine   Suspension 20 milliGRAM(s) Oral every 48 hours  fluconAZOLE   40 mG/mL Suspension 100 milliGRAM(s) Enteral Tube daily  insulin glargine Injectable (LANTUS) 12 Unit(s) SubCutaneous at bedtime  insulin lispro (HumaLOG) corrective regimen sliding scale   SubCutaneous every 6 hours  lacosamide Solution 50 milliGRAM(s) Oral two times a day  lactobacillus acidophilus 1 Tablet(s) Oral daily  levETIRAcetam  Solution 250 milliGRAM(s) Oral two times a day  levETIRAcetam  Solution 125 milliGRAM(s) Oral daily  levothyroxine 50 MICROGram(s) Oral daily  pantoprazole  Injectable 40 milliGRAM(s) IV Push once  simethicone 80 milliGRAM(s) Chew two times a day    MEDICATIONS  (PRN):  acetaminophen   Tablet .. 650 milliGRAM(s) Oral every 6 hours PRN Moderate Pain (4 - 6)  bisacodyl Suppository 10 milliGRAM(s) Rectal daily PRN Constipation  dextrose 40% Gel 15 Gram(s) Oral once PRN Blood Glucose LESS THAN 70 milliGRAM(s)/deciliter  glucagon  Injectable 1 milliGRAM(s) IntraMuscular once PRN Glucose <70 milliGRAM(s)/deciLiter          Vital Signs Last 24 Hrs  T(C): 36.8 (09 Jan 2020 12:57), Max: 36.9 (08 Jan 2020 15:13)  T(F): 98.2 (09 Jan 2020 12:57), Max: 98.4 (08 Jan 2020 15:13)  HR: 66 (09 Jan 2020 12:57) (66 - 82)  BP: 142/70 (09 Jan 2020 12:57) (142/70 - 180/87)  BP(mean): 1 (09 Jan 2020 08:00) (1 - 1)  RR: 17 (09 Jan 2020 12:57) (17 - 18)  SpO2: 99% (09 Jan 2020 12:57) (90% - 100%) on 2L NC              LABS:                        9.8    8.99  )-----------( 355      ( 09 Jan 2020 09:36 )             32.4     01-09    137  |  96  |  46<H>  ----------------------------<  234<H>  3.8   |  23  |  7.14<H>    Ca    9.0      09 Jan 2020 05:19    TPro  6.9  /  Alb  2.4<L>  /  TBili  0.3  /  DBili  x   /  AST  33  /  ALT  13  /  AlkPhos  96  01-08          CAPILLARY BLOOD GLUCOSE      POCT Blood Glucose.: 286 mg/dL (09 Jan 2020 11:48)                        CULTURES: (if applicable)  Culture Results:   Normal Respiratory Rachel present (01-03 @ 22:37)  Culture Results:   No growth at 5 days. (01-03 @ 10:32)  Culture Results:   No growth at 5 days. (01-03 @ 10:32)  Culture Results:   >100,000 CFU/ml Acinetobacter baumannii complex (01-02 @ 23:06)          Physical Examination:  PULM: Clear to auscultation bilaterally, no significant sputum production  CVS: S1, S2 heard    RADIOLOGY REVIEWED  CXR: Mild pulm edema

## 2020-01-09 NOTE — PROGRESS NOTE ADULT - SUBJECTIVE AND OBJECTIVE BOX
CC: f/u for fever    Patient is more alert, responsive, no distress  no fevers noted    REVIEW OF SYSTEMS:  limited by underlying condition.    Antimicrobials  fluconAZOLE   40 mG/mL Suspension 100 milliGRAM(s) Enteral Tube daily  meropenem  IVPB 500 milliGRAM(s) IV Intermittent every 24 hours      Vital Signs Last 24 Hrs  T(C): 36.8 (09 Jan 2020 12:57), Max: 36.9 (08 Jan 2020 15:13)  T(F): 98.2 (09 Jan 2020 12:57), Max: 98.4 (08 Jan 2020 15:13)  HR: 66 (09 Jan 2020 12:57) (66 - 82)  BP: 142/70 (09 Jan 2020 12:57) (142/70 - 180/87)  BP(mean): 1 (09 Jan 2020 08:00) (1 - 1)  RR: 17 (09 Jan 2020 12:57) (17 - 18)  SpO2: 99% (09 Jan 2020 12:57) (90% - 100%)    PHYSICAL EXAM:  General: no acute distress  Eyes:  anicteric, no conjunctival injection, no discharge  Oropharynx: no lesions or injection 	  Neck: supple, without adenopathy  R HD cath site clean  Lungs: ant rhonchi, diminished at bases  Heart: regular rate and rhythm; no murmur, rubs or gallops  Abdomen: soft, distended, nontender, G tube site clean  Skin: no lesions  Extremities: no edema  Neurologic: more alert, interactive    LAB RESULTS:                                                         9.8    8.99  )-----------( 355      ( 09 Jan 2020 09:36 )             32.4   01-09    137  |  96  |  46<H>  ----------------------------<  234<H>  3.8   |  23  |  7.14<H>    Ca    9.0      09 Jan 2020 05:19    TPro  6.9  /  Alb  2.4<L>  /  TBili  0.3  /  DBili  x   /  AST  33  /  ALT  13  /  AlkPhos  96  01-08                    MICROBIOLOGY:  RECENT CULTURES:  Culture - Sputum . (01.03.20 @ 22:37)    Gram Stain:   Few polymorphonuclear leukocytes per low power field  Moderate Squamous epithelial cells per low power field  Rare Gram Variable Cocci seen per oil power field  Few Gram Variable Rods seen per oil power field    Specimen Source: .Sputum Sputum    Culture Results:   Normal Respiratory Rachel present    01-02 @ 04:04 .Blood Blood-Peripheral     No growth to date.    Culture - Urine (01.02.20 @ 23:06)    Specimen Source: .Urine Clean Catch (Midstream)    Culture Results:   >100,000 CFU/ml Gram Negative Rods  Culture - Urine (01.02.20 @ 23:06)    -  Trimethoprim/Sulfamethoxazole: S <=2/38    -  Tobramycin: S <=4    -  Meropenem: S <=1    -  Levofloxacin: S <=2    -  Gentamicin: S <=4    -  Ciprofloxacin: S <=1    -  Ceftriaxone: S 8    -  Ceftazidime: S 4    -  Amikacin: S <=16    -  Ampicillin/Sulbactam: S <=8/4    -  Cefepime: S <=4    Specimen Source: .Urine Clean Catch (Midstream)    Culture Results:   >100,000 CFU/ml Acinetobacter baumannii complex  Culture in progress    Organism Identification: Acinetobacter baumannii    Organism: Acinetobacter baumannii    Method Type: [facility]    RADIOLOGY REVIEWED:  Xray Chest 1 View- PORTABLE-Urgent (01.04.20 @ 11:48) >  Since 1/2/20 exam, new, mild pulmonary vascular congestive changes have developed.    CT Abdomen and Pelvis w/ Oral Cont and w/ IV Cont (01.02.20 @ 16:50) >  No bowel obstruction.  No CT evidence of acute intra-abdominal or intrathoracic pathology.    < from: CT Head No Cont (01.05.20 @ 08:51) >  Unchanged volume loss, redemonstration multifocal evolving ischemic change less pronounced foci of increased attenuation likely resolving contrast staining,  petechial and subarachnoid hemorrhage not excluded. No new large hemorrhage or midline shift.    < end of copied text >  < from: Xray Abdomen 1 View PORTABLE -Routine (01.08.20 @ 08:43) >  IMPRESSION: Nonobstructive bowel gas pattern.    < end of copied text >

## 2020-01-09 NOTE — PROGRESS NOTE ADULT - SUBJECTIVE AND OBJECTIVE BOX
[facility] [place] [facility]   MD NINA MASON MD RUORU WONG, PA    TEL:  OFFICE: [phone]  DR HSU CELL: [phone]  RAS MORELOS CELL: [phone]  DR. MARQUEZ CELL: [phone]  DR. ALEX CELL: [phone]    FROM 5 PM - 7 AM PLEASE CALL ANSWERING SERVICE: [phone]    RENAL DIALYSIS NOTE  --------------------------------------------------------------------------------  Pt seen on dialysis, tolerating well   BP stable ,     PAST HISTORY  --------------------------------------------------------------------------------  No significant changes to PMH, PSH, FHx, SHx, unless otherwise noted    ALLERGIES & MEDICATIONS  --------------------------------------------------------------------------------  Allergies    No Known Allergies    Intolerances      Standing Inpatient Medications  albuterol/ipratropium for Nebulization 3 milliLiter(s) Nebulizer every 6 hours  apixaban 5 milliGRAM(s) Oral two times a day  atorvastatin 80 milliGRAM(s) Oral at bedtime  calcium acetate 667 milliGRAM(s) Oral three times a day with meals  carvedilol 3.125 milliGRAM(s) Oral every 12 hours  chlorhexidine 4% Liquid 1 Application(s) Topical <User Schedule>  clopidogrel Tablet 75 milliGRAM(s) Oral daily  dextrose 5%. 1000 milliLiter(s) IV Continuous <Continuous>  dextrose 50% Injectable 12.5 Gram(s) IV Push once  dextrose 50% Injectable 25 Gram(s) IV Push once  famotidine   Suspension 20 milliGRAM(s) Oral every 48 hours  fluconAZOLE   40 mG/mL Suspension 100 milliGRAM(s) Enteral Tube daily  insulin glargine Injectable (LANTUS) 12 Unit(s) SubCutaneous at bedtime  insulin lispro (HumaLOG) corrective regimen sliding scale   SubCutaneous every 6 hours  lacosamide Solution 50 milliGRAM(s) Oral two times a day  lactobacillus acidophilus 1 Tablet(s) Oral daily  levETIRAcetam  Solution 250 milliGRAM(s) Oral two times a day  levETIRAcetam  Solution 125 milliGRAM(s) Oral daily  levothyroxine 50 MICROGram(s) Oral daily  pantoprazole  Injectable 40 milliGRAM(s) IV Push once  simethicone 80 milliGRAM(s) Chew two times a day    PRN Inpatient Medications  acetaminophen   Tablet .. 650 milliGRAM(s) Oral every 6 hours PRN  bisacodyl Suppository 10 milliGRAM(s) Rectal daily PRN  dextrose 40% Gel 15 Gram(s) Oral once PRN  glucagon  Injectable 1 milliGRAM(s) IntraMuscular once PRN      REVIEW OF SYSTEMS  --------------------------------------------------------------------------------  General: no fever  CVS: no chest pain  RESP: no sob, no cough  ABD: no abdominal pain    MSK: no edema     VITALS/PHYSICAL EXAM  --------------------------------------------------------------------------------  T(C): 36.8 (01-09-20 @ 12:57), Max: 36.9 (01-08-20 @ 15:13)  HR: 66 (01-09-20 @ 12:57) (66 - 82)  BP: 142/70 (01-09-20 @ 12:57) (142/70 - 180/87)  RR: 17 (01-09-20 @ 12:57) (17 - 18)  SpO2: 99% (01-09-20 @ 12:57) (90% - 100%)  Wt(kg): --        Physical Exam:  	Gen: NAD  	HEENT: MMM  	Pulm: CTA B/L  	CV: S1S2  	Abd: Soft, +BS  	Ext: No LE edema B/L                      Neuro: Awake   	Skin: Warm and Dry   	Vascular access: Tunnelled HD catheter     LABS/STUDIES  --------------------------------------------------------------------------------              9.8    8.99  >-----------<  355      [01-09-20 @ 09:36]              32.4     137  |  96  |  46  ----------------------------<  234      [01-09-20 @ 05:19]  3.8   |  23  |  7.14        Ca     9.0     [01-09-20 @ 05:19]    TPro  6.9  /  Alb  2.4  /  TBili  0.3  /  DBili  x   /  AST  33  /  ALT  13  /  AlkPhos  96  [01-08-20 @ 05:58]          Creatinine Trend:  SCr 7.14 [01-09 @ 05:19]  SCr 5.62 [01-08 @ 05:58]  SCr 9.06 [01-07 @ 06:26]  SCr 7.36 [01-06 @ 06:26]  SCr 6.46 [01-05 @ 22:15]    Urinalysis - [01-02-20 @ 20:34]      Color Light Yellow / Appearance Slightly Turbid / SG 1.012 / pH 7.0      Gluc 100 mg/dL / Ketone Negative  / Bili Negative / Urobili Negative       Blood Small / Protein 300 mg/dL / Leuk Est Moderate / Nitrite Negative      RBC 1 / WBC 46 / Hyaline 1 / Gran  / Sq Epi  / Non Sq Epi 0 / Bacteria Many      Iron 45, TIBC 145, %sat 31      [12-26-19 @ 23:24]  Ferritin 1369      [12-26-19 @ 23:24]  PTH -- (Ca 6.9)      [06-05-19 @ 08:14]   562  HbA1c 8.4      [12-15-19 @ 11:00]  TSH 6.22      [01-03-20 @ 10:03]  Lipid: chol 134, , HDL 56, LDL 52      [12-15-19 @ 10:56]    HBsAb >1000.0      [12-14-19 @ 23:48]  HBsAg Nonreact      [12-14-19 @ 23:48]  HCV 0.15, Nonreact      [12-14-19 @ 23:48]

## 2020-01-09 NOTE — PROGRESS NOTE ADULT - PROBLEM SELECTOR PLAN 1
Based on work up thus far, likely secondary to complicated UTI  Clinically improving and leukocytosis appropriately downtrending  UCx: Acinetobacter (Pansensitive)  BCx: NGTD, RVP Negative, CT A/P unrevealing for infectious source  - Completed Meropenem x 7 total days  - ID following, will appreciate recommendations

## 2020-01-09 NOTE — PROGRESS NOTE ADULT - SUBJECTIVE AND OBJECTIVE BOX
INTERVAL HPI/OVERNIGHT EVENTS:    patient seen and examined at bedside with family present   he denies abdominal pain, n/v  tolerating peg feeds    MEDICATIONS  (STANDING):  albuterol/ipratropium for Nebulization 3 milliLiter(s) Nebulizer every 6 hours  apixaban 5 milliGRAM(s) Oral two times a day  atorvastatin 80 milliGRAM(s) Oral at bedtime  calcium acetate 667 milliGRAM(s) Oral three times a day with meals  carvedilol 3.125 milliGRAM(s) Oral every 12 hours  chlorhexidine 4% Liquid 1 Application(s) Topical <User Schedule>  clopidogrel Tablet 75 milliGRAM(s) Oral daily  dextrose 5%. 1000 milliLiter(s) (50 mL/Hr) IV Continuous <Continuous>  dextrose 50% Injectable 12.5 Gram(s) IV Push once  dextrose 50% Injectable 25 Gram(s) IV Push once  famotidine   Suspension 20 milliGRAM(s) Oral every 48 hours  fluconAZOLE   40 mG/mL Suspension 100 milliGRAM(s) Enteral Tube daily  insulin glargine Injectable (LANTUS) 14 Unit(s) SubCutaneous at bedtime  insulin lispro (HumaLOG) corrective regimen sliding scale   SubCutaneous every 6 hours  lacosamide Solution 50 milliGRAM(s) Oral two times a day  lactobacillus acidophilus 1 Tablet(s) Oral daily  levETIRAcetam  Solution 250 milliGRAM(s) Oral two times a day  levETIRAcetam  Solution 125 milliGRAM(s) Oral daily  levothyroxine 50 MICROGram(s) Oral daily  meropenem  IVPB 500 milliGRAM(s) IV Intermittent every 24 hours  pantoprazole  Injectable 40 milliGRAM(s) IV Push once  simethicone 80 milliGRAM(s) Chew two times a day    MEDICATIONS  (PRN):  acetaminophen   Tablet .. 650 milliGRAM(s) Oral every 6 hours PRN Moderate Pain (4 - 6)  bisacodyl Suppository 10 milliGRAM(s) Rectal daily PRN Constipation  dextrose 40% Gel 15 Gram(s) Oral once PRN Blood Glucose LESS THAN 70 milliGRAM(s)/deciliter  glucagon  Injectable 1 milliGRAM(s) IntraMuscular once PRN Glucose <70 milliGRAM(s)/deciLiter      Allergies    No Known Allergies    Intolerances        Review of Systems:    General:  No wt loss, fevers, chills, night sweats,fatigue,   Eyes:  Good vision, no reported pain  ENT:  No sore throat, pain, runny nose, dysphagia  CV:  No pain, palpitations, hypo/hypertension  Resp:  No dyspnea, cough, tachypnea, wheezing  GI:  No pain, No nausea, No vomiting, No diarrhea, No constipation, No weight loss, No fever, No pruritis, No rectal bleeding, No melena, No dysphagia  :  No pain, bleeding, incontinence, nocturia  Muscle:  No pain, weakness  Neuro:  No weakness, tingling, memory problems  Psych:  No fatigue, insomnia, mood problems, depression  Endocrine:  No polyuria, polydypsia, cold/heat intolerance  Heme:  No petechiae, ecchymosis, easy bruisability  Skin:  No rash, tattoos, scars, edema      Vital Signs Last 24 Hrs  T(C): 36.6 (09 Jan 2020 11:41), Max: 36.9 (08 Jan 2020 15:13)  T(F): 97.8 (09 Jan 2020 11:41), Max: 98.4 (08 Jan 2020 15:13)  HR: 69 (09 Jan 2020 11:41) (69 - 82)  BP: 174/84 (09 Jan 2020 11:41) (142/77 - 180/87)  BP(mean): 1 (09 Jan 2020 08:00) (1 - 1)  RR: 18 (09 Jan 2020 11:41) (18 - 18)  SpO2: 100% (09 Jan 2020 11:41) (90% - 100%)    PHYSICAL EXAM:    GENERAL:  NAD   HEENT:  NC/AT  ABDOMEN:  Soft, non-tender, +distended, normoactive bowel sounds, +PEG c/d/i  EXTEREMITIES:  no cyanosis, clubbing or edema  SKIN:  No rash/erythema/ecchymoses/petechiae/wounds/abscess/warm/dry  NEURO:  More alert, interactive         LABS:                        9.8    8.99  )-----------( 355      ( 09 Jan 2020 09:36 )             32.4     01-09    137  |  96  |  46<H>  ----------------------------<  234<H>  3.8   |  23  |  7.14<H>    Ca    9.0      09 Jan 2020 05:19    TPro  6.9  /  Alb  2.4<L>  /  TBili  0.3  /  DBili  x   /  AST  33  /  ALT  13  /  AlkPhos  96  01-08          RADIOLOGY & ADDITIONAL TESTS:

## 2020-01-09 NOTE — PROGRESS NOTE ADULT - ASSESSMENT
64 M hx of CAD s/p 2009, 2019, HTN, DM2, carotid stenosis, ESRD on HD admitted as transfer on mechanical ventilation for status epilepticus in setting of new embolic CVA At [facility], he received tPA on 12/13/19, was noted to have SAH after tPA and hyponatremia, s/p hypertonic saline, course complicated by status epilepticus and intubated for airway protection, subsequently transferred to [facility] for further management, s/p extubation, course c/b dysphagia and failed S&S eval s/p PEG tube placement, and septic shock attributed to complicated UTI and refractory cough likely from ongoing aspiration

## 2020-01-09 NOTE — PROGRESS NOTE ADULT - PROBLEM SELECTOR PLAN 3
-UC with Acinetobacter baumannii complex  -s/p Meropenem   -Blood cultures negative   -CT C/A/P without evidence of occult infection.

## 2020-01-09 NOTE — PROGRESS NOTE ADULT - PROBLEM SELECTOR PLAN 4
S/p CARLA in June 2019  TTE in 12/2019 shows normal LVSF and trace MR.   Significant tropninemia in the setting of septic shock, likely type II; Down-trending   - Continue Plavix in setting of history of CAD and in setting of carotid stenosis.   - Continue Atorvastatin  - resume coreg as pt hypertensive

## 2020-01-09 NOTE — PROGRESS NOTE ADULT - ASSESSMENT
63 yo male with ESRD on HD (M-W-FRI without AVF as awaiting renal transplant expected within 3 months; Dr. Barrientos; [facility]), CAD s/p stent 2009 and CARLA in June 2019, HTN, DM type 2, hypothyroidism and left carotid stenosis admitted for altered mental status during dialysis, he became unresponsive and arm not moving therefore he was sent to hospital. At [facility], he received tPA on 12/13/19. Was noted to have SAH after tPA. Started on 3% saline. Had seizure, despite multiple AEDs remained in status epilepticus and was transferred to [facility] for continuous EEG monitoring. s/p extubation, PEG tube placement. Is awake and following commands. Nephrology is following for ESRD management.    ESRD on HD TTS via tunnelled HD catheter   - HD consent in the chart  - Last HD 1/7/2020 with 1.1 L UF   Pt seen on dialysis tolerating well . Bp stable   - please dose Keppra post HD on HD days      - needs rehab w/ onsite HD    Hyponatremia  - from increased free water intake  - Use minimum possible free water flushes  - should improve w/ HD    Anemia  - unable to use epogen due to CVA  - transfuse PRN Hgb <7 or hemodynamically significant bleeding  - no evidence of occult bleeding  - GI following     HTN  -BP stable  - Monitor closely    Fever   -etiology?  -s/p CT ABd   - PT also with permacath -- follow up ID

## 2020-01-09 NOTE — PROGRESS NOTE ADULT - PROBLEM SELECTOR PLAN 8
Pt with intermittent hypoglycemic episodes. Was previously on levemir 8 units and premeal 3 tidwm prior to admission  - switch to lantus 12 units qhs  - MDSS with fingerstick monitoring  - will transition from continuous feeds to bolus- nutrition consult    # abd pain- likely 2/2 gas as improved after gas-x  - abd xray showing nonspecific bowel gas pattern, no obstruction or ileus suspected  - continue gas-x PRN  - serial abd exams

## 2020-01-09 NOTE — PROGRESS NOTE ADULT - ASSESSMENT
dysphagia  cva  anemia  fever  Abdominal pain    s/p peg; tolerating feeds  AXR negative for bowel obstruction, intraperitoneal free air. Abdominal pain has resolved.  Patient had CT abd/pelvis with PO and IV con completed on 01/02 which demonstrated no evidence of bowel obstruction and no intra-abdominal GI pathology.    cont feeds as tolerated  cbc daily  ID eval noted; cont meropenem   HD per nephrology  will follow     Advanced care planning was discussed with patient and family.  Advanced care planning forms were reviewed and discussed.  Risks, benefits and alternatives of gastroenterologic procedures were discussed in detail and all questions were answered.    30 minutes spent.

## 2020-01-09 NOTE — PROGRESS NOTE ADULT - SUBJECTIVE AND OBJECTIVE BOX
Shen De Dios MD  Division of Hospital Medicine  Pager: [phone]  If no response or off-hours, page [phone]  -------------------------------------    Patient is a 64y old  Male who presents with a chief complaint of Seizures (09 Jan 2020 13:19)      SUBJECTIVE / OVERNIGHT EVENTS: None acute  ADDITIONAL REVIEW OF SYSTEMS: pt reports feeling better today, no abd pain, no fevers/chills.     MEDICATIONS  (STANDING):  albuterol/ipratropium for Nebulization 3 milliLiter(s) Nebulizer every 6 hours  apixaban 5 milliGRAM(s) Oral two times a day  atorvastatin 80 milliGRAM(s) Oral at bedtime  calcium acetate 667 milliGRAM(s) Oral three times a day with meals  carvedilol 3.125 milliGRAM(s) Oral every 12 hours  chlorhexidine 4% Liquid 1 Application(s) Topical <User Schedule>  clopidogrel Tablet 75 milliGRAM(s) Oral daily  dextrose 5%. 1000 milliLiter(s) (50 mL/Hr) IV Continuous <Continuous>  dextrose 50% Injectable 12.5 Gram(s) IV Push once  dextrose 50% Injectable 25 Gram(s) IV Push once  famotidine   Suspension 20 milliGRAM(s) Oral every 48 hours  fluconAZOLE   40 mG/mL Suspension 100 milliGRAM(s) Enteral Tube daily  insulin glargine Injectable (LANTUS) 14 Unit(s) SubCutaneous at bedtime  insulin lispro (HumaLOG) corrective regimen sliding scale   SubCutaneous every 6 hours  lacosamide Solution 50 milliGRAM(s) Oral two times a day  lactobacillus acidophilus 1 Tablet(s) Oral daily  levETIRAcetam  Solution 250 milliGRAM(s) Oral two times a day  levETIRAcetam  Solution 125 milliGRAM(s) Oral daily  levothyroxine 50 MICROGram(s) Oral daily  pantoprazole  Injectable 40 milliGRAM(s) IV Push once  simethicone 80 milliGRAM(s) Chew two times a day    MEDICATIONS  (PRN):  acetaminophen   Tablet .. 650 milliGRAM(s) Oral every 6 hours PRN Moderate Pain (4 - 6)  bisacodyl Suppository 10 milliGRAM(s) Rectal daily PRN Constipation  dextrose 40% Gel 15 Gram(s) Oral once PRN Blood Glucose LESS THAN 70 milliGRAM(s)/deciliter  glucagon  Injectable 1 milliGRAM(s) IntraMuscular once PRN Glucose <70 milliGRAM(s)/deciLiter      CAPILLARY BLOOD GLUCOSE      POCT Blood Glucose.: 286 mg/dL (09 Jan 2020 11:48)  POCT Blood Glucose.: 219 mg/dL (09 Jan 2020 05:03)  POCT Blood Glucose.: 187 mg/dL (09 Jan 2020 00:05)  POCT Blood Glucose.: 263 mg/dL (08 Jan 2020 17:59)    I&O's Summary      PHYSICAL EXAM:  Vital Signs Last 24 Hrs  T(C): 36.8 (09 Jan 2020 12:57), Max: 36.9 (08 Jan 2020 15:13)  T(F): 98.2 (09 Jan 2020 12:57), Max: 98.4 (08 Jan 2020 15:13)  HR: 66 (09 Jan 2020 12:57) (66 - 82)  BP: 142/70 (09 Jan 2020 12:57) (142/70 - 180/87)  BP(mean): 1 (09 Jan 2020 08:00) (1 - 1)  RR: 17 (09 Jan 2020 12:57) (17 - 18)  SpO2: 99% (09 Jan 2020 12:57) (90% - 100%)  CONSTITUTIONAL: NAD, well-developed, well-groomed  EYES: PERRLA; conjunctiva and sclera clear  ENMT: Moist oral mucosa, no pharyngeal injection or exudates; normal dentition  NECK: Supple, no palpable masses; no thyromegaly  RESPIRATORY: Normal respiratory effort; +decreased breath sounds bibases  CARDIOVASCULAR: Regular rate and rhythm, normal S1 and S2, no murmur/rub/gallop; No lower extremity edema; Peripheral pulses are 2+ bilaterally  ABDOMEN: Nontender to palpation, normoactive bowel sounds, no rebound/guarding; No hepatosplenomegaly, +PEG site clean  MUSCLOSKELETAL:  Normal gait; no clubbing or cyanosis of digits; no joint swelling or tenderness to palpation  PSYCH: A+O to person, place, and time; affect appropriate  NEUROLOGY: CN 2-12 are intact and symmetric; no gross sensory deficits; +mild L facial droop, +B/L LE weakness  SKIN: No rashes; no palpable lesions    LABS:                        9.8    8.99  )-----------( 355      ( 09 Jan 2020 09:36 )             32.4     01-09    137  |  96  |  46<H>  ----------------------------<  234<H>  3.8   |  23  |  7.14<H>    Ca    9.0      09 Jan 2020 05:19    TPro  6.9  /  Alb  2.4<L>  /  TBili  0.3  /  DBili  x   /  AST  33  /  ALT  13  /  AlkPhos  96  01-08

## 2020-01-09 NOTE — PROGRESS NOTE ADULT - ASSESSMENT
63 yo male with ESRD, HD, PAF, admitted with multiple CVA's.  Difficulty with pulmonary toilet and airway secretions, but clear CXR and CT scan.  S/P PEG  Respiratory status stable  Sptm with Enterobacter and MSSA- viewed as colonizers  Developed fever, dramatic rise in WBC  Vanco x 1  Zosyn started, changed to  Meropenem  Repeat CT of C/A/P on 1/2 no source of infection.  Urine Cx >100k Acinetobacter,? significance in HD patient  Afebrile, WBC now normal more alert- clinically improved    Suggest:  d/c meropenem, completed >7 days of abx,   closely monitor off abx  Follow temps and CBC/diff   family updated at bedside

## 2020-01-09 NOTE — PROGRESS NOTE ADULT - PROBLEM SELECTOR PLAN 9
DVT ppx: Patient on Eliquis  Diet: has tube feeds to PEG tube  Dispo: Likely rehab if sugars stabilize and bp improves on coreg

## 2020-01-09 NOTE — CHART NOTE - NSCHARTNOTEFT_GEN_A_CORE
Nutrition Follow Up Note  Patient seen for: Nutrition follow up and bolus tube feeding recommendation    Per medical record pt is a 65yo male with PMH: CAD, HTN, DM2, carotid stenosis, ESRD on HD per hospitalist note () "admitted as transfer on mechanical ventilation for status epilepticus in setting of CVA At [facility], he received tPA on 19, was noted to have SAH after tPA and hyponatremia, s/p hypertonic saline, course complicated by status epilepticus and intubated for airway protection, subsequently transferred to [facility] for further management, s/p extubation, course c/b dysphagia and failed S&S eval s/p PEG tube placement, and septic shock in the setting of complicated UTI and refractory cough likely from ongoing aspiration". Last HD noted on .    Source: Pt, pt's family at bedside, RN, previous RD notes, and medical record.    Diet: Tube feed via PEG:  Nepro @ goal rate 50 ml/hr x 24 hours to provide 1200ml formula, 2160 orestes, 97 g protein, 872ml free water (provides  32 orestes/Kg and  1.4 g protein/Kg based on dosing wt 66.6Kg).    Patient reports:    Enteral /Parenteral Nutrition: Nepro @ goal rate 50 ml/hr x 24 hours to provide 1200ml formula, 2160 orestes, 97 g protein, 872ml free water (provides  32 orestes/Kg and  1.4 g protein/Kg based on dosing wt 66.6Kg).  () 350ml (meets 29% of needs)  () 300ml (meets 25% of needs)  () 50ml (EN held for PEG connector malfunction)  () 750ml (meets 63% of needs)  () 600ml (meets 50% of needs)  () 650ml (meets 54% of needs)    Weights:   () Pre .7 pounds             Post .3 pounds  () Pre  pounds             Post .3 pounds  () Pre .8 pounds             Post .5 pounds  () Pre .8 pounds             Post .6 pounds  % Weight Change: ?accuracy of bed weights; Pt with fluid shifts during hospital course due to HD    Pertinent Medications: Lantus, Humalog sliding scale, Coreg, Protonix, Dulcolax, Mylicon, Pepcid, lactobacillus acidophilus, Lipitor, Synthroid  Pertinent Labs: ()     Skin per nursing documentation: no pressure injuries  Edema: none noted per flow sheets    Estimated Needs:   [x] no change since previous assessment: based on dosing wt  66.6 [id number] kcal (25-30 kcal/kg), 80-93 g protein (1.2-1.4 g/kg)  [ ] recalculated:     Previous Nutrition Diagnosis: increased protein needs  Nutrition Diagnosis is: ongoing; Being addressed with EN    New Nutrition Diagnosis: N/A     Interventions:     Recommend  1) Bolus feed recommendation:   2) Continue to monitor EN intake, diet tolerance, weight, labs, skin integrity, food preferences, and educational needs.    RD remains available upon request and will follow up per protocol.  Jenni Conway, Dietetic Intern (Pager #[phone]) Nutrition Follow Up Note  Patient seen for: Nutrition follow up and bolus tube feeding recommendation    Per medical record pt is a 63yo male with PMH: CAD, HTN, DM2, carotid stenosis, ESRD on HD per hospitalist note () "admitted as transfer on mechanical ventilation for status epilepticus in setting of CVA At [facility], he received tPA on 19, was noted to have SAH after tPA and hyponatremia, s/p hypertonic saline, course complicated by status epilepticus and intubated for airway protection, subsequently transferred to [facility] for further management, s/p extubation, course c/b dysphagia and failed S&S eval s/p PEG tube placement, and septic shock in the setting of complicated UTI and refractory cough likely from ongoing aspiration". Last HD noted on .    Source: Pt, pt's family at bedside, RN, previous RD notes, and medical record.    Diet: Tube feed via PEG:  Nepro @ goal rate 50 ml/hr x 24 hours to provide 1200ml formula, 2160 orestes, 97 g protein, 872ml free water (provides  32 orestes/Kg and  1.4 g protein/Kg based on dosing wt 66.6Kg).    Pt at dialysis at time of visit. Per flow sheets pt very lethargic and A&Ox1 today (). Per PCA pt tolerating tube feeds well; tube feed currently held for HD. No N+V noted per flow sheets. Per flow sheets last BM was . Pt noted on bowel regimen.     Enteral /Parenteral Nutrition: Nepro @ goal rate 50 ml/hr x 24 hours to provide 1200ml formula, 2160 orestes, 97 g protein, 872ml free water (provides  32 orestes/Kg and  1.4 g protein/Kg based on dosing wt 66.6Kg).  () 350ml (meets 29% of needs)  () 300ml (meets 25% of needs)  () 50ml (EN held for PEG connector malfunction)  () 750ml (meets 63% of needs)  () 600ml (meets 50% of needs)  () 650ml (meets 54% of needs)    Weights:   () Pre .7 pounds             Post .3 pounds  () Pre  pounds             Post .3 pounds  () Pre .8 pounds             Post .5 pounds  () Pre .8 pounds             Post .6 pounds  % Weight Change: ?accuracy of bed weights; Pt with fluid shifts during hospital course due to HD    Pertinent Medications: Lantus, Humalog sliding scale, Coreg, Protonix, Dulcolax, Mylicon, Pepcid, lactobacillus acidophilus, Lipitor, Synthroid  Pertinent Labs: () BUN 46 <H>, Creatinine 7.14 <H>, Glucose 234 <H>, eGFR 7 <L>, FInger Sticks 187-286 () Finger Sticks     Skin per nursing documentation: no pressure injuries  Edema: none noted per flow sheets    Estimated Needs:   [x] no change since previous assessment: based on dosing wt  66.6 [id number] kcal (25-30 kcal/kg), 80-93 g protein (1.2-1.4 g/kg)  [ ] recalculated:     Previous Nutrition Diagnosis: increased protein needs  Nutrition Diagnosis is: ongoing; Being addressed with EN    New Nutrition Diagnosis: N/A     Interventions:   Recommend  1) Bolus feed recommendation: Nepro x 5 cans per day (1 can for breakfast, 2 cans for lunch, 2 cans for dinner) to provide 1185ml, 2125kcal, 95g protein, 860ml free water (based on 66.6kg dosing weight provides: 32kcal/kg and 1.4g/kg protein); Defer water flushes to team. Spoke to provider. Monitor and adjust as needed.   2) Continue to monitor EN intake and tolerance, weight, labs, skin integrity, food preferences, and educational needs.    RD remains available upon request and will follow up per protocol.  Jenni Conway, Dietetic Intern (Pager #[phone]) Nutrition Follow Up Note  Patient seen for: Nutrition follow up and verbal consult from NP for bolus tube feeding recommendation.    Per medical record pt is a 65yo male with PMH: CAD, HTN, DM2, carotid stenosis, ESRD on HD per hospitalist note () "admitted as transfer () on mechanical ventilation for status epilepticus in setting of CVA At [facility], he received tPA on 19, was noted to have SAH after tPA and hyponatremia, s/p hypertonic saline, course complicated by status epilepticus and intubated for airway protection, subsequently transferred to [facility] for further management, s/p extubation, course c/b dysphagia and failed S&S eval s/p PEG tube placement (), and septic shock in the setting of complicated UTI and refractory cough likely from ongoing aspiration". Last HD noted on  with 1.1L fluid removed per flow sheets.    Source: PCA, previous RD notes, and medical record.    Diet: Tube feed via PEG:  Nepro @ goal rate 50 ml/hr x 24 hours to provide 1200ml formula, 2160 orestes, 97 g protein, 872ml free water (provides  32 orestes/Kg and  1.4 g protein/Kg based on dosing wt 66.6Kg).    Pt at dialysis at time of visit. Per flow sheets pt very lethargic and A&Ox1 today (). Per PCA pt tolerating tube feeds well; tube feed currently held for HD. No N+V noted per flow sheets. Per flow sheets last BM was . Pt noted on bowel regimen.     Enteral /Parenteral Nutrition: Nepro @ goal rate 50 ml/hr x 24 hours to provide 1200ml formula, 2160 orestes, 97 g protein, 872ml free water (provides  32 orestes/Kg and  1.4 g protein/Kg based on dosing wt 66.6Kg).  () 350ml (meets 29% of needs)  () 300ml (meets 25% of needs)  () 50ml (EN held for PEG connector malfunction)  () 750ml (meets 63% of needs)  () 600ml (meets 50% of needs)  () 650ml (meets 54% of needs)    Weights:   () Pre .7 pounds             Post .3 pounds  () Pre  pounds             Post .3 pounds  () Pre .8 pounds             Post .5 pounds  () Pre .8 pounds             Post .6 pounds  % Weight Change: ?accuracy of bed weights; Pt with fluid shifts during hospital course due to HD    Pertinent Medications: Lantus, Humalog sliding scale, Coreg, Protonix, Dulcolax suppository PRN, Mylicon, Pepcid, lactobacillus acidophilus, Lipitor, Synthroid, Phoslo  Pertinent Labs: () BUN 46 <H>, Creatinine 7.14 <H>, Glucose 234 <H>, eGFR 7 <L>, FInger Sticks 187-286 () Finger Sticks     Skin per nursing documentation: no pressure injuries  Edema: none noted per flow sheets    Estimated Needs:   [x] no change since previous assessment: based on dosing wt  66.6 [id number] kcal (25-30 kcal/kg), 80-93 g protein (1.2-1.4 g/kg)  [ ] recalculated:     Previous Nutrition Diagnosis: increased protein needs  Nutrition Diagnosis is: ongoing; Being addressed with EN    New Nutrition Diagnosis: N/A     Interventions:   Recommend  1) Bolus feed recommendation: Nepro x 5 cans (237ml/can) per day (1 can for breakfast, 2 cans for lunch, 2 cans for dinner or as per team) to provide 1185ml, 2125kcal, 95g protein, 860ml free water (based on 66.6kg dosing weight provides: 32kcal/kg and 1.4g/kg protein); Defer water flushes to team. Spoke to provider. Monitor and adjust as needed.   2) Continue to monitor EN intake and tolerance, weight, labs, skin integrity, food preferences, and educational needs.    RD remains available upon request and will follow up per protocol.  Jenni Conway, Dietetic Intern (Pager #[phone])

## 2020-01-10 LAB
ANION GAP SERPL CALC-SCNC: 16 MMOL/L — SIGNIFICANT CHANGE UP (ref 5–17)
BUN SERPL-MCNC: 28 MG/DL — HIGH (ref 7–23)
CALCIUM SERPL-MCNC: 8.9 MG/DL — SIGNIFICANT CHANGE UP (ref 8.4–10.5)
CHLORIDE SERPL-SCNC: 93 MMOL/L — LOW (ref 96–108)
CO2 SERPL-SCNC: 26 MMOL/L — SIGNIFICANT CHANGE UP (ref 22–31)
CREAT SERPL-MCNC: 4.72 MG/DL — HIGH (ref 0.5–1.3)
GLUCOSE SERPL-MCNC: 189 MG/DL — HIGH (ref 70–99)
POTASSIUM SERPL-MCNC: 3.9 MMOL/L — SIGNIFICANT CHANGE UP (ref 3.5–5.3)
POTASSIUM SERPL-SCNC: 3.9 MMOL/L — SIGNIFICANT CHANGE UP (ref 3.5–5.3)
SODIUM SERPL-SCNC: 135 MMOL/L — SIGNIFICANT CHANGE UP (ref 135–145)

## 2020-01-10 PROCEDURE — 99233 SBSQ HOSP IP/OBS HIGH 50: CPT

## 2020-01-10 RX ORDER — LANOLIN ALCOHOL/MO/W.PET/CERES
3 CREAM (GRAM) TOPICAL ONCE
Refills: 0 | Status: COMPLETED | OUTPATIENT
Start: 2020-01-10 | End: 2020-01-10

## 2020-01-10 RX ORDER — INSULIN LISPRO 100/ML
VIAL (ML) SUBCUTANEOUS AT BEDTIME
Refills: 0 | Status: DISCONTINUED | OUTPATIENT
Start: 2020-01-10 | End: 2020-01-12

## 2020-01-10 RX ORDER — INSULIN LISPRO 100/ML
VIAL (ML) SUBCUTANEOUS
Refills: 0 | Status: DISCONTINUED | OUTPATIENT
Start: 2020-01-10 | End: 2020-01-12

## 2020-01-10 RX ORDER — CARVEDILOL PHOSPHATE 80 MG/1
6.25 CAPSULE, EXTENDED RELEASE ORAL EVERY 12 HOURS
Refills: 0 | Status: DISCONTINUED | OUTPATIENT
Start: 2020-01-10 | End: 2020-01-14

## 2020-01-10 RX ADMIN — LACOSAMIDE 50 MILLIGRAM(S): 50 TABLET ORAL at 06:11

## 2020-01-10 RX ADMIN — Medication 3 MILLILITER(S): at 17:12

## 2020-01-10 RX ADMIN — SIMETHICONE 80 MILLIGRAM(S): 80 TABLET, CHEWABLE ORAL at 17:10

## 2020-01-10 RX ADMIN — LEVETIRACETAM 250 MILLIGRAM(S): 250 TABLET, FILM COATED ORAL at 17:11

## 2020-01-10 RX ADMIN — Medication 4: at 22:09

## 2020-01-10 RX ADMIN — CARVEDILOL PHOSPHATE 6.25 MILLIGRAM(S): 80 CAPSULE, EXTENDED RELEASE ORAL at 17:14

## 2020-01-10 RX ADMIN — Medication 50 MICROGRAM(S): at 06:11

## 2020-01-10 RX ADMIN — CARVEDILOL PHOSPHATE 3.12 MILLIGRAM(S): 80 CAPSULE, EXTENDED RELEASE ORAL at 06:13

## 2020-01-10 RX ADMIN — Medication 3 MILLILITER(S): at 06:11

## 2020-01-10 RX ADMIN — Medication 3 MILLILITER(S): at 12:54

## 2020-01-10 RX ADMIN — FLUCONAZOLE 100 MILLIGRAM(S): 150 TABLET ORAL at 12:54

## 2020-01-10 RX ADMIN — APIXABAN 5 MILLIGRAM(S): 2.5 TABLET, FILM COATED ORAL at 06:13

## 2020-01-10 RX ADMIN — Medication 667 MILLIGRAM(S): at 12:54

## 2020-01-10 RX ADMIN — CHLORHEXIDINE GLUCONATE 1 APPLICATION(S): 213 SOLUTION TOPICAL at 23:25

## 2020-01-10 RX ADMIN — APIXABAN 5 MILLIGRAM(S): 2.5 TABLET, FILM COATED ORAL at 17:11

## 2020-01-10 RX ADMIN — CLOPIDOGREL BISULFATE 75 MILLIGRAM(S): 75 TABLET, FILM COATED ORAL at 12:54

## 2020-01-10 RX ADMIN — LEVETIRACETAM 125 MILLIGRAM(S): 250 TABLET, FILM COATED ORAL at 12:53

## 2020-01-10 RX ADMIN — LACOSAMIDE 50 MILLIGRAM(S): 50 TABLET ORAL at 17:12

## 2020-01-10 RX ADMIN — LEVETIRACETAM 250 MILLIGRAM(S): 250 TABLET, FILM COATED ORAL at 06:11

## 2020-01-10 RX ADMIN — Medication 4: at 18:01

## 2020-01-10 RX ADMIN — SIMETHICONE 80 MILLIGRAM(S): 80 TABLET, CHEWABLE ORAL at 06:11

## 2020-01-10 RX ADMIN — Medication 650 MILLIGRAM(S): at 13:30

## 2020-01-10 RX ADMIN — Medication 667 MILLIGRAM(S): at 22:08

## 2020-01-10 RX ADMIN — INSULIN GLARGINE 12 UNIT(S): 100 INJECTION, SOLUTION SUBCUTANEOUS at 22:10

## 2020-01-10 RX ADMIN — Medication 3 MILLILITER(S): at 23:25

## 2020-01-10 RX ADMIN — ATORVASTATIN CALCIUM 80 MILLIGRAM(S): 80 TABLET, FILM COATED ORAL at 22:08

## 2020-01-10 RX ADMIN — Medication 1 TABLET(S): at 12:54

## 2020-01-10 RX ADMIN — Medication 2: at 13:07

## 2020-01-10 RX ADMIN — Medication 667 MILLIGRAM(S): at 06:12

## 2020-01-10 RX ADMIN — Medication 3 MILLIGRAM(S): at 22:08

## 2020-01-10 RX ADMIN — Medication 650 MILLIGRAM(S): at 12:53

## 2020-01-10 NOTE — PROGRESS NOTE ADULT - ASSESSMENT
65 yo male with ESRD on HD (M-W-FRI without AVF as awaiting renal transplant expected within 3 months; Dr. Barrientos; [facility]), CAD s/p stent 2009 and CARLA in June 2019, HTN, DM type 2, hypothyroidism and left carotid stenosis admitted for altered mental status during dialysis, he became unresponsive and arm not moving therefore he was sent to hospital. At [facility], he received tPA on 12/13/19. Was noted to have SAH after tPA. Started on 3% saline. Had seizure, despite multiple AEDs remained in status epilepticus and was transferred to [facility] for continuous EEG monitoring. s/p extubation, PEG tube placement. Is awake and following commands. Nephrology is following for ESRD management.    ESRD on HD TTS via tunnelled HD catheter   - HD consent in the chart  - Last HD 1/9 /2020 with 1.0 L UF   Plan for HD tomorrow AM 1 st shift , will be swithed to MWF schedule in Rehab  - please dose Keppra post HD on HD days      - needs rehab w/ onsite HD    Hyponatremia  - from increased free water intake  - Use minimum possible free water flushes  - should improve w/ HD    Anemia  - unable to use epogen due to CVA  - transfuse PRN Hgb <7 or hemodynamically significant bleeding  - no evidence of occult bleeding  - GI following     HTN  -BP stable  - Monitor closely    Fever   -resolved  -s/p CT ABd   - PT also with permacath -

## 2020-01-10 NOTE — PROGRESS NOTE ADULT - PROBLEM SELECTOR PLAN 4
S/p CARLA in June 2019  TTE in 12/2019 shows normal LVSF and trace MR.   Significant tropninemia in the setting of septic shock, likely type II; Down-trending   - Continue Plavix in setting of history of CAD and in setting of carotid stenosis.   - Continue Atorvastatin  - increase coreg as pt hypertensive

## 2020-01-10 NOTE — PROGRESS NOTE ADULT - ASSESSMENT
dysphagia  cva  anemia  fever    s/p peg; tolerating feeds  cont feeds as tolerated  cbc daily  ID eval noted; follow their ongoing recommendations   HD per nephrology  will follow     Advanced care planning was discussed with patient and family.  Advanced care planning forms were reviewed and discussed.  Risks, benefits and alternatives of gastroenterologic procedures were discussed in detail and all questions were answered.    30 minutes spent.

## 2020-01-10 NOTE — PROGRESS NOTE ADULT - SUBJECTIVE AND OBJECTIVE BOX
INTERVAL HPI/OVERNIGHT EVENTS:    patient seen and examined at bedside with family present   he denies abdominal pain, n/v  tolerating peg feeds    MEDICATIONS  (STANDING):  albuterol/ipratropium for Nebulization 3 milliLiter(s) Nebulizer every 6 hours  apixaban 5 milliGRAM(s) Oral two times a day  atorvastatin 80 milliGRAM(s) Oral at bedtime  calcium acetate 667 milliGRAM(s) Oral three times a day with meals  carvedilol 6.25 milliGRAM(s) Oral every 12 hours  chlorhexidine 4% Liquid 1 Application(s) Topical <User Schedule>  clopidogrel Tablet 75 milliGRAM(s) Oral daily  dextrose 5%. 1000 milliLiter(s) (50 mL/Hr) IV Continuous <Continuous>  dextrose 50% Injectable 12.5 Gram(s) IV Push once  dextrose 50% Injectable 25 Gram(s) IV Push once  famotidine   Suspension 20 milliGRAM(s) Oral every 48 hours  fluconAZOLE   40 mG/mL Suspension 100 milliGRAM(s) Enteral Tube daily  insulin glargine Injectable (LANTUS) 12 Unit(s) SubCutaneous at bedtime  insulin lispro (HumaLOG) corrective regimen sliding scale   SubCutaneous four times a day before meals  insulin lispro (HumaLOG) corrective regimen sliding scale   SubCutaneous at bedtime  lacosamide Solution 50 milliGRAM(s) Oral two times a day  lactobacillus acidophilus 1 Tablet(s) Oral daily  levETIRAcetam  Solution 250 milliGRAM(s) Oral two times a day  levETIRAcetam  Solution 125 milliGRAM(s) Oral daily  levothyroxine 50 MICROGram(s) Oral daily  pantoprazole  Injectable 40 milliGRAM(s) IV Push once  simethicone 80 milliGRAM(s) Chew two times a day    MEDICATIONS  (PRN):  acetaminophen   Tablet .. 650 milliGRAM(s) Oral every 6 hours PRN Moderate Pain (4 - 6)  bisacodyl Suppository 10 milliGRAM(s) Rectal daily PRN Constipation  dextrose 40% Gel 15 Gram(s) Oral once PRN Blood Glucose LESS THAN 70 milliGRAM(s)/deciliter  glucagon  Injectable 1 milliGRAM(s) IntraMuscular once PRN Glucose <70 milliGRAM(s)/deciLiter      Allergies    No Known Allergies    Intolerances        Review of Systems:    General:  No wt loss, fevers, chills, night sweats,fatigue,   Eyes:  Good vision, no reported pain  ENT:  No sore throat, pain, runny nose, dysphagia  CV:  No pain, palpitations, hypo/hypertension  Resp:  No dyspnea, cough, tachypnea, wheezing  GI:  No pain, No nausea, No vomiting, No diarrhea, No constipation, No weight loss, No fever, No pruritis, No rectal bleeding, No melena, No dysphagia  :  No pain, bleeding, incontinence, nocturia  Muscle:  No pain, weakness  Neuro:  No weakness, tingling, memory problems  Psych:  No fatigue, insomnia, mood problems, depression  Endocrine:  No polyuria, polydypsia, cold/heat intolerance  Heme:  No petechiae, ecchymosis, easy bruisability  Skin:  No rash, tattoos, scars, edema      Vital Signs Last 24 Hrs  T(C): 36.7 (10 Herve 2020 07:38), Max: 36.8 (09 Jan 2020 12:57)  T(F): 98 (10 Herve 2020 07:38), Max: 98.3 (10 Herve 2020 04:07)  HR: 74 (10 Herve 2020 07:38) (66 - 86)  BP: 173/83 (10 Herve 2020 07:38) (140/72 - 174/84)  BP(mean): --  RR: 18 (10 Herve 2020 07:38) (17 - 18)  SpO2: 95% (10 Herve 2020 07:38) (95% - 100%)    PHYSICAL EXAM:    GENERAL:  NAD   HEENT:  NC/AT  ABDOMEN:  Soft, non-tender, non- distended, normoactive bowel sounds, +PEG c/d/i  EXTEREMITIES :  no cyanosis, clubbing or edema  SKIN:  No rash/erythema/ecchymoses/petechiae/wounds/abscess/warm/dry  NEURO:  More alert, interactive     LABS:                        9.8    8.99  )-----------( 355      ( 09 Jan 2020 09:36 )             32.4     01-10    135  |  93<L>  |  28<H>  ----------------------------<  189<H>  3.9   |  26  |  4.72<H>    Ca    8.9      10 Herve 2020 06:01            RADIOLOGY & ADDITIONAL TESTS:

## 2020-01-10 NOTE — PROGRESS NOTE ADULT - SUBJECTIVE AND OBJECTIVE BOX
CC: f/u for fever    Patient is more alert, responsive, no distress  no fevers noted    REVIEW OF SYSTEMS:  limited by underlying condition.    Antimicrobials    fluconAZOLE   40 mG/mL Suspension 100 milliGRAM(s) Enteral Tube daily    Vital Signs Last 24 Hrs  T(C): 36.7 (10 Herve 2020 07:38), Max: 36.8 (09 Jan 2020 12:57)  T(F): 98 (10 Herve 2020 07:38), Max: 98.3 (10 Herve 2020 04:07)  HR: 74 (10 Herve 2020 07:38) (66 - 86)  BP: 173/83 (10 Herve 2020 07:38) (140/72 - 174/84)  BP(mean): --  RR: 18 (10 Herve 2020 07:38) (17 - 18)  SpO2: 95% (10 Herve 2020 07:38) (95% - 100%)    PHYSICAL EXAM:  General: no acute distress  Eyes:  anicteric, no conjunctival injection, no discharge  Oropharynx: no lesions or injection 	  Neck: supple, without adenopathy  R HD cath site clean  Lungs: ant rhonchi, diminished at bases  Heart: regular rate and rhythm; no murmur, rubs or gallops  Abdomen: soft, distended, nontender, G tube site clean  Skin: no lesions  Extremities: no edema  Neurologic: more alert, interactive    LAB RESULTS:                                                                    9.8    8.99  )-----------( 355      ( 09 Jan 2020 09:36 )             32.4   01-10    135  |  93<L>  |  28<H>  ----------------------------<  189<H>  3.9   |  26  |  4.72<H>    Ca    8.9      10 Herve 2020 06:01                        MICROBIOLOGY:  RECENT CULTURES:  Culture - Sputum . (01.03.20 @ 22:37)    Gram Stain:   Few polymorphonuclear leukocytes per low power field  Moderate Squamous epithelial cells per low power field  Rare Gram Variable Cocci seen per oil power field  Few Gram Variable Rods seen per oil power field    Specimen Source: .Sputum Sputum    Culture Results:   Normal Respiratory Rachel present    01-02 @ 04:04 .Blood Blood-Peripheral     No growth to date.    Culture - Urine (01.02.20 @ 23:06)    Specimen Source: .Urine Clean Catch (Midstream)    Culture Results:   >100,000 CFU/ml Gram Negative Rods  Culture - Urine (01.02.20 @ 23:06)    -  Trimethoprim/Sulfamethoxazole: S <=2/38    -  Tobramycin: S <=4    -  Meropenem: S <=1    -  Levofloxacin: S <=2    -  Gentamicin: S <=4    -  Ciprofloxacin: S <=1    -  Ceftriaxone: S 8    -  Ceftazidime: S 4    -  Amikacin: S <=16    -  Ampicillin/Sulbactam: S <=8/4    -  Cefepime: S <=4    Specimen Source: .Urine Clean Catch (Midstream)    Culture Results:   >100,000 CFU/ml Acinetobacter baumannii complex  Culture in progress    Organism Identification: Acinetobacter baumannii    Organism: Acinetobacter baumannii    Method Type: TALITA    RADIOLOGY REVIEWED:    < from: Xray Chest 1 View- PORTABLE-Routine (01.08.20 @ 09:24) >  IMPRESSION:     Mild pulmonary edema.    < end of copied text >    Xray Chest 1 View- PORTABLE-Urgent (01.04.20 @ 11:48) >  Since 1/2/20 exam, new, mild pulmonary vascular congestive changes have developed.    CT Abdomen and Pelvis w/ Oral Cont and w/ IV Cont (01.02.20 @ 16:50) >  No bowel obstruction.  No CT evidence of acute intra-abdominal or intrathoracic pathology.    < from: CT Head No Cont (01.05.20 @ 08:51) >  Unchanged volume loss, redemonstration multifocal evolving ischemic change less pronounced foci of increased attenuation likely resolving contrast staining,  petechial and subarachnoid hemorrhage not excluded. No new large hemorrhage or midline shift.    < end of copied text >  < from: Xray Abdomen 1 View PORTABLE -Routine (01.08.20 @ 08:43) >  IMPRESSION: Nonobstructive bowel gas pattern.    < end of copied text >

## 2020-01-10 NOTE — PROGRESS NOTE ADULT - SUBJECTIVE AND OBJECTIVE BOX
[facility]   MD NINA MASON MD RUORU WONG, PA    TEL:  OFFICE: [phone]  DR HSU CELL: [phone]  RAS MORELOS CELL: [phone]  DR. MARQUEZ CELL: [phone]  DR. ALEX CELL: [phone]    FROM 5 PM - 7 AM PLEASE CALL ANSWERING SERVICE: [phone]    RENAL FOLLOW UP NOTE  --------------------------------------------------------------------------------  HPI:      Pt seen and examined at bedside.   Denies SOB, chest pain     PAST HISTORY  --------------------------------------------------------------------------------  No significant changes to PMH, PSH, FHx, SHx, unless otherwise noted    ALLERGIES & MEDICATIONS  --------------------------------------------------------------------------------  Allergies    No Known Allergies    Intolerances      Standing Inpatient Medications  albuterol/ipratropium for Nebulization 3 milliLiter(s) Nebulizer every 6 hours  apixaban 5 milliGRAM(s) Oral two times a day  atorvastatin 80 milliGRAM(s) Oral at bedtime  calcium acetate 667 milliGRAM(s) Oral three times a day with meals  carvedilol 6.25 milliGRAM(s) Oral every 12 hours  chlorhexidine 4% Liquid 1 Application(s) Topical <User Schedule>  clopidogrel Tablet 75 milliGRAM(s) Oral daily  dextrose 5%. 1000 milliLiter(s) IV Continuous <Continuous>  dextrose 50% Injectable 12.5 Gram(s) IV Push once  dextrose 50% Injectable 25 Gram(s) IV Push once  famotidine   Suspension 20 milliGRAM(s) Oral every 48 hours  fluconAZOLE   40 mG/mL Suspension 100 milliGRAM(s) Enteral Tube daily  insulin glargine Injectable (LANTUS) 12 Unit(s) SubCutaneous at bedtime  insulin lispro (HumaLOG) corrective regimen sliding scale   SubCutaneous four times a day before meals  insulin lispro (HumaLOG) corrective regimen sliding scale   SubCutaneous at bedtime  lacosamide Solution 50 milliGRAM(s) Oral two times a day  lactobacillus acidophilus 1 Tablet(s) Oral daily  levETIRAcetam  Solution 250 milliGRAM(s) Oral two times a day  levETIRAcetam  Solution 125 milliGRAM(s) Oral daily  levothyroxine 50 MICROGram(s) Oral daily  pantoprazole  Injectable 40 milliGRAM(s) IV Push once  simethicone 80 milliGRAM(s) Chew two times a day    PRN Inpatient Medications  acetaminophen   Tablet .. 650 milliGRAM(s) Oral every 6 hours PRN  bisacodyl Suppository 10 milliGRAM(s) Rectal daily PRN  dextrose 40% Gel 15 Gram(s) Oral once PRN  glucagon  Injectable 1 milliGRAM(s) IntraMuscular once PRN      REVIEW OF SYSTEMS  --------------------------------------------------------------------------------  General: no fever  CVS: no chest pain  RESP: no sob, no cough  ABD: no abdominal pain  MSK: no edema     VITALS/PHYSICAL EXAM  --------------------------------------------------------------------------------  T(C): 36.9 (01-10-20 @ 11:42), Max: 36.9 (01-10-20 @ 11:42)  HR: 76 (01-10-20 @ 11:42) (66 - 86)  BP: 158/86 (01-10-20 @ 11:42) (140/72 - 173/83)  RR: 18 (01-10-20 @ 11:42) (17 - 18)  SpO2: 94% (01-10-20 @ 11:42) (94% - 100%)  Wt(kg): --        01-09-20 @ 07:01  -  01-10-20 @ 07:00  --------------------------------------------------------  IN: 237 mL / OUT: 1000 mL / NET: -763 mL      Physical Exam:  	Gen: NAD  	HEENT: MMM  	Pulm: CTA B/L  	CV: S1S2  	Abd: Soft, +BS  	Ext: No LE edema B/L                      Neuro: Awake   	Skin: Warm and Dry   	Vascular access: tunnelled HD catheter     LABS/STUDIES  --------------------------------------------------------------------------------              9.8    8.99  >-----------<  355      [01-09-20 @ 09:36]              32.4     135  |  93  |  28  ----------------------------<  189      [01-10-20 @ 06:01]  3.9   |  26  |  4.72        Ca     8.9     [01-10-20 @ 06:01]            Creatinine Trend:  SCr 4.72 [01-10 @ 06:01]  SCr 7.14 [01-09 @ 05:19]  SCr 5.62 [01-08 @ 05:58]  SCr 9.06 [01-07 @ 06:26]  SCr 7.36 [01-06 @ 06:26]    Urinalysis - [01-02-20 @ 20:34]      Color Light Yellow / Appearance Slightly Turbid / SG 1.012 / pH 7.0      Gluc 100 mg/dL / Ketone Negative  / Bili Negative / Urobili Negative       Blood Small / Protein 300 mg/dL / Leuk Est Moderate / Nitrite Negative      RBC 1 / WBC 46 / Hyaline 1 / Gran  / Sq Epi  / Non Sq Epi 0 / Bacteria Many      Iron 45, TIBC 145, %sat 31      [12-26-19 @ 23:24]  Ferritin 1369      [12-26-19 @ 23:24]  PTH -- (Ca 6.9)      [06-05-19 @ 08:14]   562  HbA1c 8.4      [12-15-19 @ 11:00]  TSH 6.22      [01-03-20 @ 10:03]  Lipid: chol 134, , HDL 56, LDL 52      [12-15-19 @ 10:56]    HBsAb >1000.0      [12-14-19 @ 23:48]  HBsAg Nonreact      [12-14-19 @ 23:48]  HCV 0.15, Nonreact      [12-14-19 @ 23:48]

## 2020-01-10 NOTE — PROGRESS NOTE ADULT - ASSESSMENT
65 yo male with ESRD, HD, PAF, admitted with multiple CVA's.  Difficulty with pulmonary toilet and airway secretions, but clear CXR and CT scan.  S/P PEG  Respiratory status stable  Sptm with Enterobacter and MSSA- viewed as colonizers  Developed fever, dramatic rise in WBC  Vanco x 1  Zosyn started, changed to  Meropenem  Repeat CT of C/A/P on 1/2 no source of infection.  Urine Cx >100k Acinetobacter,? significance in HD patient  Afebrile, WBC now normal more alert- clinically improved    Suggest:  closely monitor off abx  Follow temps and CBC/diff   family updated at bedside  No active ID issues at present, reconsult as needed

## 2020-01-10 NOTE — PROGRESS NOTE ADULT - SUBJECTIVE AND OBJECTIVE BOX
Shen De Dios MD  Division of Hospital Medicine  Pager: [phone]  If no response or off-hours, page [phone]  -------------------------------------    Patient is a 64y old  Male who presents with a chief complaint of Seizures (10 Herve 2020 12:20)      SUBJECTIVE / OVERNIGHT EVENTS: none acute  ADDITIONAL REVIEW OF SYSTEMS: pt feels better, no fevers/chills, no cough, no sob, no abd pain.     MEDICATIONS  (STANDING):  albuterol/ipratropium for Nebulization 3 milliLiter(s) Nebulizer every 6 hours  apixaban 5 milliGRAM(s) Oral two times a day  atorvastatin 80 milliGRAM(s) Oral at bedtime  calcium acetate 667 milliGRAM(s) Oral three times a day with meals  carvedilol 6.25 milliGRAM(s) Oral every 12 hours  chlorhexidine 4% Liquid 1 Application(s) Topical <User Schedule>  clopidogrel Tablet 75 milliGRAM(s) Oral daily  dextrose 5%. 1000 milliLiter(s) (50 mL/Hr) IV Continuous <Continuous>  dextrose 50% Injectable 12.5 Gram(s) IV Push once  dextrose 50% Injectable 25 Gram(s) IV Push once  famotidine   Suspension 20 milliGRAM(s) Oral every 48 hours  fluconAZOLE   40 mG/mL Suspension 100 milliGRAM(s) Enteral Tube daily  insulin glargine Injectable (LANTUS) 12 Unit(s) SubCutaneous at bedtime  insulin lispro (HumaLOG) corrective regimen sliding scale   SubCutaneous four times a day before meals  insulin lispro (HumaLOG) corrective regimen sliding scale   SubCutaneous at bedtime  lacosamide Solution 50 milliGRAM(s) Oral two times a day  lactobacillus acidophilus 1 Tablet(s) Oral daily  levETIRAcetam  Solution 250 milliGRAM(s) Oral two times a day  levETIRAcetam  Solution 125 milliGRAM(s) Oral daily  levothyroxine 50 MICROGram(s) Oral daily  pantoprazole  Injectable 40 milliGRAM(s) IV Push once  simethicone 80 milliGRAM(s) Chew two times a day    MEDICATIONS  (PRN):  acetaminophen   Tablet .. 650 milliGRAM(s) Oral every 6 hours PRN Moderate Pain (4 - 6)  bisacodyl Suppository 10 milliGRAM(s) Rectal daily PRN Constipation  dextrose 40% Gel 15 Gram(s) Oral once PRN Blood Glucose LESS THAN 70 milliGRAM(s)/deciliter  glucagon  Injectable 1 milliGRAM(s) IntraMuscular once PRN Glucose <70 milliGRAM(s)/deciLiter      CAPILLARY BLOOD GLUCOSE      POCT Blood Glucose.: 165 mg/dL (10 Herve 2020 13:01)  POCT Blood Glucose.: 169 mg/dL (10 Herve 2020 09:13)  POCT Blood Glucose.: 254 mg/dL (09 Jan 2020 23:36)    I&O's Summary    09 Jan 2020 07:01  -  10 Herve 2020 07:00  --------------------------------------------------------  IN: 237 mL / OUT: 1000 mL / NET: -763 mL        PHYSICAL EXAM:  Vital Signs Last 24 Hrs  T(C): 36.3 (10 Herve 2020 13:18), Max: 36.9 (10 Herve 2020 11:42)  T(F): 97.3 (10 Herve 2020 13:18), Max: 98.4 (10 Herev 2020 11:42)  HR: 99 (10 Herve 2020 13:18) (74 - 99)  BP: 112/61 (10 Herve 2020 13:18) (112/61 - 173/83)  BP(mean): --  RR: 18 (10 Herve 2020 13:18) (18 - 18)  SpO2: 99% (10 Herve 2020 13:18) (94% - 99%)  CONSTITUTIONAL: NAD, well-developed, well-groomed  EYES: PERRLA; conjunctiva and sclera clear  ENMT: Moist oral mucosa, no pharyngeal injection or exudates; normal dentition  NECK: Supple, no palpable masses; no thyromegaly  RESPIRATORY: Normal respiratory effort; lungs are clear to auscultation bilaterally  CARDIOVASCULAR: Regular rate and rhythm, normal S1 and S2, no murmur/rub/gallop; No lower extremity edema; Peripheral pulses are 2+ bilaterally  ABDOMEN: Nontender to palpation, normoactive bowel sounds, no rebound/guarding; No hepatosplenomegaly, +peg site clean  MUSCLOSKELETAL:  Normal gait; no clubbing or cyanosis of digits; no joint swelling or tenderness to palpation  PSYCH: A+O to person, place, and time; affect appropriate  NEUROLOGY: CN 2-12 are intact and symmetric; no gross sensory deficits; +mild L facial droop, +B/L LE weakness R> L  SKIN: No rashes; no palpable lesions    LABS:                        9.8    8.99  )-----------( 355      ( 09 Jan 2020 09:36 )             32.4     01-10    135  |  93<L>  |  28<H>  ----------------------------<  189<H>  3.9   |  26  |  4.72<H>    Ca    8.9      10 Herve 2020 06:01                COORDINATION OF CARE:  Care Discussed with Consultants/Other Providers [Y/N]: Dr. sifuentes

## 2020-01-10 NOTE — PROGRESS NOTE ADULT - ASSESSMENT
64 M hx of CAD s/p 2009, 2019, HTN, DM2, carotid stenosis, ESRD on HD admitted as transfer on mechanical ventilation for status epilepticus in setting of new embolic CVA At [facility], he received tPA on 12/13/19, was noted to have SAH after tPA and hyponatremia, s/p hypertonic saline, course complicated by status epilepticus and intubated for airway protection, subsequently transferred to [facility] for further management, s/p extubation, course c/b dysphagia and failed S&S eval s/p PEG tube placement, and septic shock attributed to complicated UTI and refractory cough likely from ongoing aspiration, now clinically stable awaiting xfer to rehab

## 2020-01-10 NOTE — PROGRESS NOTE ADULT - PROBLEM SELECTOR PLAN 10
Transitions of Care Status:  1.  Name of PCP: Jamie Corral  2.  PCP Contacted on Admission: [x] Y    [ ] N ***Contacted 1/6/2020  , 1/8  3.  PCP contacted at Discharge: [ ] Y    [ ] N    [ ] N/A  4.  Post-Discharge Appointment Date and Location:  5.  Summary of Handoff given to PCP: Emailed Dr. Ayala re: hospital course and dispo plans    dispo: awaiting out of network insurance approval for rehab.

## 2020-01-11 LAB
ANION GAP SERPL CALC-SCNC: 18 MMOL/L — HIGH (ref 5–17)
BUN SERPL-MCNC: 42 MG/DL — HIGH (ref 7–23)
CALCIUM SERPL-MCNC: 9 MG/DL — SIGNIFICANT CHANGE UP (ref 8.4–10.5)
CHLORIDE SERPL-SCNC: 93 MMOL/L — LOW (ref 96–108)
CO2 SERPL-SCNC: 23 MMOL/L — SIGNIFICANT CHANGE UP (ref 22–31)
CREAT SERPL-MCNC: 6.17 MG/DL — HIGH (ref 0.5–1.3)
GLUCOSE SERPL-MCNC: 186 MG/DL — HIGH (ref 70–99)
POTASSIUM SERPL-MCNC: 4.2 MMOL/L — SIGNIFICANT CHANGE UP (ref 3.5–5.3)
POTASSIUM SERPL-SCNC: 4.2 MMOL/L — SIGNIFICANT CHANGE UP (ref 3.5–5.3)
SODIUM SERPL-SCNC: 134 MMOL/L — LOW (ref 135–145)

## 2020-01-11 PROCEDURE — 99232 SBSQ HOSP IP/OBS MODERATE 35: CPT

## 2020-01-11 RX ORDER — LANOLIN ALCOHOL/MO/W.PET/CERES
5 CREAM (GRAM) TOPICAL ONCE
Refills: 0 | Status: COMPLETED | OUTPATIENT
Start: 2020-01-11 | End: 2020-01-11

## 2020-01-11 RX ORDER — IPRATROPIUM/ALBUTEROL SULFATE 18-103MCG
3 AEROSOL WITH ADAPTER (GRAM) INHALATION EVERY 6 HOURS
Refills: 0 | Status: DISCONTINUED | OUTPATIENT
Start: 2020-01-11 | End: 2020-01-14

## 2020-01-11 RX ADMIN — Medication 5 MILLIGRAM(S): at 21:46

## 2020-01-11 RX ADMIN — CARVEDILOL PHOSPHATE 6.25 MILLIGRAM(S): 80 CAPSULE, EXTENDED RELEASE ORAL at 18:21

## 2020-01-11 RX ADMIN — SIMETHICONE 80 MILLIGRAM(S): 80 TABLET, CHEWABLE ORAL at 18:22

## 2020-01-11 RX ADMIN — SIMETHICONE 80 MILLIGRAM(S): 80 TABLET, CHEWABLE ORAL at 06:07

## 2020-01-11 RX ADMIN — LEVETIRACETAM 125 MILLIGRAM(S): 250 TABLET, FILM COATED ORAL at 13:11

## 2020-01-11 RX ADMIN — LACOSAMIDE 50 MILLIGRAM(S): 50 TABLET ORAL at 06:08

## 2020-01-11 RX ADMIN — Medication 2: at 06:08

## 2020-01-11 RX ADMIN — Medication 667 MILLIGRAM(S): at 21:46

## 2020-01-11 RX ADMIN — Medication 667 MILLIGRAM(S): at 06:07

## 2020-01-11 RX ADMIN — ATORVASTATIN CALCIUM 80 MILLIGRAM(S): 80 TABLET, FILM COATED ORAL at 21:46

## 2020-01-11 RX ADMIN — Medication 3 MILLILITER(S): at 13:11

## 2020-01-11 RX ADMIN — Medication 3 MILLILITER(S): at 06:08

## 2020-01-11 RX ADMIN — FLUCONAZOLE 100 MILLIGRAM(S): 150 TABLET ORAL at 18:20

## 2020-01-11 RX ADMIN — LEVETIRACETAM 250 MILLIGRAM(S): 250 TABLET, FILM COATED ORAL at 06:07

## 2020-01-11 RX ADMIN — LACOSAMIDE 50 MILLIGRAM(S): 50 TABLET ORAL at 18:20

## 2020-01-11 RX ADMIN — APIXABAN 5 MILLIGRAM(S): 2.5 TABLET, FILM COATED ORAL at 18:21

## 2020-01-11 RX ADMIN — Medication 6: at 18:22

## 2020-01-11 RX ADMIN — LEVETIRACETAM 250 MILLIGRAM(S): 250 TABLET, FILM COATED ORAL at 18:21

## 2020-01-11 RX ADMIN — Medication 50 MICROGRAM(S): at 06:07

## 2020-01-11 RX ADMIN — INSULIN GLARGINE 12 UNIT(S): 100 INJECTION, SOLUTION SUBCUTANEOUS at 21:46

## 2020-01-11 RX ADMIN — FAMOTIDINE 20 MILLIGRAM(S): 10 INJECTION INTRAVENOUS at 18:20

## 2020-01-11 RX ADMIN — CHLORHEXIDINE GLUCONATE 1 APPLICATION(S): 213 SOLUTION TOPICAL at 21:47

## 2020-01-11 RX ADMIN — Medication 667 MILLIGRAM(S): at 13:11

## 2020-01-11 RX ADMIN — Medication 4: at 21:39

## 2020-01-11 RX ADMIN — APIXABAN 5 MILLIGRAM(S): 2.5 TABLET, FILM COATED ORAL at 06:07

## 2020-01-11 RX ADMIN — Medication 3 MILLILITER(S): at 18:23

## 2020-01-11 RX ADMIN — Medication 1 TABLET(S): at 13:11

## 2020-01-11 RX ADMIN — CLOPIDOGREL BISULFATE 75 MILLIGRAM(S): 75 TABLET, FILM COATED ORAL at 13:11

## 2020-01-11 NOTE — PROGRESS NOTE ADULT - ASSESSMENT
63 yo male with ESRD on HD (M-W-FRI without AVF as awaiting renal transplant expected within 3 months; Dr. Barrientos; [facility]), CAD s/p stent 2009 and CARLA in June 2019, HTN, DM type 2, hypothyroidism and left carotid stenosis admitted for altered mental status during dialysis, he became unresponsive and arm not moving therefore he was sent to hospital. At [facility], he received tPA on 12/13/19. Was noted to have SAH after tPA. Started on 3% saline. Had seizure, despite multiple AEDs remained in status epilepticus and was transferred to [facility] for continuous EEG monitoring. s/p extubation, PEG tube placement. Is awake and following commands. Nephrology is following for ESRD management.    ESRD on HD MWF via tunnelled HD catheter   - HD consent in the chart  - Last HD 1/10 with 1 L UF   Plan for HD on MOnday, to be swithed to MWF schedule   - please dose Keppra post HD on HD days      - needs rehab w/ onsite HD    Hyponatremia  - from increased free water intake  - Use minimum possible free water flushes  - should improve w/ HD    Anemia  - unable to use epogen due to CVA  - transfuse PRN Hgb <7 or hemodynamically significant bleeding  - no evidence of occult bleeding  - GI following     HTN  -BP stable  - Monitor closely    Fever   -resolved  -s/p CT ABd   - PT also with permacath -

## 2020-01-11 NOTE — PROGRESS NOTE ADULT - ASSESSMENT
64 M hx of CAD s/p 2009, 2019, HTN, DM2, carotid stenosis, ESRD on HD admitted as transfer on mechanical ventilation for status epilepticus in setting of new embolic CVA At [facility], he received tPA on 12/13/19, was noted to have SAH after tPA and hyponatremia, s/p hypertonic saline, course complicated by status epilepticus and intubated for airway protection, subsequently transferred to [facility] for further management, s/p extubation, course c/b dysphagia and failed S&S eval s/p PEG tube placement, and septic shock attributed to complicated UTI and refractory cough likely from ongoing aspiration, now clinically stable awaiting xfer to rehab

## 2020-01-11 NOTE — PROGRESS NOTE ADULT - SUBJECTIVE AND OBJECTIVE BOX
[facility] [place] [facility]   MD NINA MASON MD RUORU WONG, PA    TEL:  OFFICE: [phone]  DR HSU CELL: [phone]  RAS MORELOS CELL: [phone]  DR. MARQUEZ CELL: [phone]  DR. ALEX CELL: [phone]    FROM 5 PM - 7 AM PLEASE CALL ANSWERING SERVICE: [phone]    RENAL FOLLOW UP NOTE  --------------------------------------------------------------------------------  HPI:      Pt seen and examined at bedside.        PAST HISTORY  --------------------------------------------------------------------------------  No significant changes to PMH, PSH, FHx, SHx, unless otherwise noted    ALLERGIES & MEDICATIONS  --------------------------------------------------------------------------------  Allergies    No Known Allergies    Intolerances      Standing Inpatient Medications  albuterol/ipratropium for Nebulization 3 milliLiter(s) Nebulizer every 6 hours  apixaban 5 milliGRAM(s) Oral two times a day  atorvastatin 80 milliGRAM(s) Oral at bedtime  calcium acetate 667 milliGRAM(s) Oral three times a day with meals  carvedilol 6.25 milliGRAM(s) Oral every 12 hours  chlorhexidine 4% Liquid 1 Application(s) Topical <User Schedule>  clopidogrel Tablet 75 milliGRAM(s) Oral daily  dextrose 5%. 1000 milliLiter(s) IV Continuous <Continuous>  dextrose 50% Injectable 12.5 Gram(s) IV Push once  dextrose 50% Injectable 25 Gram(s) IV Push once  famotidine   Suspension 20 milliGRAM(s) Oral every 48 hours  fluconAZOLE   40 mG/mL Suspension 100 milliGRAM(s) Enteral Tube daily  insulin glargine Injectable (LANTUS) 12 Unit(s) SubCutaneous at bedtime  insulin lispro (HumaLOG) corrective regimen sliding scale   SubCutaneous four times a day before meals  insulin lispro (HumaLOG) corrective regimen sliding scale   SubCutaneous at bedtime  lacosamide Solution 50 milliGRAM(s) Oral two times a day  lactobacillus acidophilus 1 Tablet(s) Oral daily  levETIRAcetam  Solution 250 milliGRAM(s) Oral two times a day  levETIRAcetam  Solution 125 milliGRAM(s) Oral daily  levothyroxine 50 MICROGram(s) Oral daily  pantoprazole  Injectable 40 milliGRAM(s) IV Push once  simethicone 80 milliGRAM(s) Chew two times a day    PRN Inpatient Medications  acetaminophen   Tablet .. 650 milliGRAM(s) Oral every 6 hours PRN  bisacodyl Suppository 10 milliGRAM(s) Rectal daily PRN  dextrose 40% Gel 15 Gram(s) Oral once PRN  glucagon  Injectable 1 milliGRAM(s) IntraMuscular once PRN      REVIEW OF SYSTEMS  --------------------------------------------------------------------------------  General: no fever    MSK: no edema     VITALS/PHYSICAL EXAM  --------------------------------------------------------------------------------  T(C): 36.4 (01-11-20 @ 12:12), Max: 36.8 (01-10-20 @ 19:19)  HR: 76 (01-11-20 @ 12:12) (62 - 78)  BP: 151/79 (01-11-20 @ 12:12) (98/51 - 174/80)  RR: 20 (01-11-20 @ 12:12) (17 - 20)  SpO2: 96% (01-11-20 @ 12:12) (96% - 98%)  Wt(kg): --        01-10-20 @ 07:01  -  01-11-20 @ 07:00  --------------------------------------------------------  IN: 1289 mL / OUT: 0 mL / NET: 1289 mL    01-11-20 @ 07:01  -  01-11-20 @ 14:57  --------------------------------------------------------  IN: 800 mL / OUT: 1800 mL / NET: -1000 mL      Physical Exam:  	Gen: NAD  	HEENT: MMM  	Pulm: CTA B/L  	CV: S1S2  	Abd: Soft, +BS  	Ext: No LE edema B/L                      Neuro: confused  	Skin: Warm and Dry   	vascular: tunnellled HD catheter    LABS/STUDIES  --------------------------------------------------------------------------------    134  |  93  |  42  ----------------------------<  186      [01-11-20 @ 06:04]  4.2   |  23  |  6.17        Ca     9.0     [01-11-20 @ 06:04]            Creatinine Trend:  SCr 6.17 [01-11 @ 06:04]  SCr 4.72 [01-10 @ 06:01]  SCr 7.14 [01-09 @ 05:19]  SCr 5.62 [01-08 @ 05:58]  SCr 9.06 [01-07 @ 06:26]    Urinalysis - [01-02-20 @ 20:34]      Color Light Yellow / Appearance Slightly Turbid / SG 1.012 / pH 7.0      Gluc 100 mg/dL / Ketone Negative  / Bili Negative / Urobili Negative       Blood Small / Protein 300 mg/dL / Leuk Est Moderate / Nitrite Negative      RBC 1 / WBC 46 / Hyaline 1 / Gran  / Sq Epi  / Non Sq Epi 0 / Bacteria Many      Iron 45, TIBC 145, %sat 31      [12-26-19 @ 23:24]  Ferritin 1369      [12-26-19 @ 23:24]  PTH -- (Ca 6.9)      [06-05-19 @ 08:14]   562  HbA1c 8.4      [12-15-19 @ 11:00]  TSH 6.22      [01-03-20 @ 10:03]  Lipid: chol 134, , HDL 56, LDL 52      [12-15-19 @ 10:56]    HBsAb >1000.0      [12-14-19 @ 23:48]  HBsAg Nonreact      [12-14-19 @ 23:48]  HCV 0.15, Nonreact      [12-14-19 @ 23:48]

## 2020-01-11 NOTE — PROGRESS NOTE ADULT - PROBLEM SELECTOR PLAN 1
Based on work up thus far, likely secondary to complicated UTI  Clinically improving and leukocytosis appropriately downtrending  UCx: Acinetobacter (Pansensitive)  BCx: NGTD, RVP Negative, CT A/P unrevealing for infectious source  - Completed Meropenem x 7 total days  - ID following,

## 2020-01-11 NOTE — PROGRESS NOTE ADULT - PROBLEM SELECTOR PLAN 8
Pt with intermittent hypoglycemic episodes. Was previously on levemir 8 units and premeal 3 tidwm prior to admission  - continue lantus 12 units qhs  - MDSS with fingerstick monitoring  - will transition from continuous feeds to bolus- nutrition consult    # abd pain- likely 2/2 gas as improved after gas-x  - abd xray showing nonspecific bowel gas pattern, no obstruction or ileus suspected  - continue gas-x PRN  - serial abd exams

## 2020-01-11 NOTE — PROGRESS NOTE ADULT - SUBJECTIVE AND OBJECTIVE BOX
Patient is a 64y old  Male who presents with a chief complaint of Seizures (10 Herve 2020 12:20)      SUBJECTIVE / OVERNIGHT EVENTS: none acute  ADDITIONAL REVIEW OF SYSTEMS: pt feels better, no fevers/chills, no cough, no sob, no abd pain.     T(C): 36.4 (01-11-20 @ 15:30), Max: 36.4 (01-11-20 @ 08:25)  HR: 79 (01-11-20 @ 15:30) (62 - 79)  BP: 161/73 (01-11-20 @ 15:30) (98/51 - 161/73)  RR: 18 (01-11-20 @ 15:30) (17 - 20)  SpO2: 100% (01-11-20 @ 15:30) (96% - 100%)    MEDICATIONS  (STANDING):  albuterol/ipratropium for Nebulization 3 milliLiter(s) Nebulizer every 6 hours  apixaban 5 milliGRAM(s) Oral two times a day  atorvastatin 80 milliGRAM(s) Oral at bedtime  calcium acetate 667 milliGRAM(s) Oral three times a day with meals  carvedilol 6.25 milliGRAM(s) Oral every 12 hours  chlorhexidine 4% Liquid 1 Application(s) Topical <User Schedule>  clopidogrel Tablet 75 milliGRAM(s) Oral daily  dextrose 5%. 1000 milliLiter(s) (50 mL/Hr) IV Continuous <Continuous>  dextrose 50% Injectable 12.5 Gram(s) IV Push once  dextrose 50% Injectable 25 Gram(s) IV Push once  famotidine   Suspension 20 milliGRAM(s) Oral every 48 hours  fluconAZOLE   40 mG/mL Suspension 100 milliGRAM(s) Enteral Tube daily  insulin glargine Injectable (LANTUS) 12 Unit(s) SubCutaneous at bedtime  insulin lispro (HumaLOG) corrective regimen sliding scale   SubCutaneous four times a day before meals  insulin lispro (HumaLOG) corrective regimen sliding scale   SubCutaneous at bedtime  lacosamide Solution 50 milliGRAM(s) Oral two times a day  lactobacillus acidophilus 1 Tablet(s) Oral daily  levETIRAcetam  Solution 250 milliGRAM(s) Oral two times a day  levETIRAcetam  Solution 125 milliGRAM(s) Oral daily  levothyroxine 50 MICROGram(s) Oral daily  pantoprazole  Injectable 40 milliGRAM(s) IV Push once  simethicone 80 milliGRAM(s) Chew two times a day    MEDICATIONS  (PRN):  acetaminophen   Tablet .. 650 milliGRAM(s) Oral every 6 hours PRN Moderate Pain (4 - 6)  bisacodyl Suppository 10 milliGRAM(s) Rectal daily PRN Constipation  dextrose 40% Gel 15 Gram(s) Oral once PRN Blood Glucose LESS THAN 70 milliGRAM(s)/deciliter  glucagon  Injectable 1 milliGRAM(s) IntraMuscular once PRN Glucose <70 milliGRAM(s)/deciLiter    PHYSICAL EXAM:  CONSTITUTIONAL: NAD, well-developed, well-groomed  EYES: PERRLA; conjunctiva and sclera clear  ENMT: Moist oral mucosa, no pharyngeal injection or exudates; normal dentition  NECK: Supple, no palpable masses; no thyromegaly  RESPIRATORY: Normal respiratory effort; lungs are clear to auscultation bilaterally  CARDIOVASCULAR: Regular rate and rhythm, normal S1 and S2, no murmur/rub/gallop; No lower extremity edema; Peripheral pulses are 2+ bilaterally  ABDOMEN: Nontender to palpation, normoactive bowel sounds, no rebound/guarding; No hepatosplenomegaly, +peg site clean  MUSCLOSKELETAL:  Normal gait; no clubbing or cyanosis of digits; no joint swelling or tenderness to palpation  PSYCH: A+O to person, place, and time; affect appropriate  NEUROLOGY: CN 2-12 are intact and symmetric; no gross sensory deficits; +mild L facial droop, +B/L LE weakness R> L  SKIN: No rashes; no palpable lesions                134|93|42<186  4.2|23|6.17  9.0,--,--  01-11 @ 06:04              COORDINATION OF CARE:  Care Discussed with Consultants/Other Providers [Y/N]: Dr. sifuentes

## 2020-01-12 LAB
ANION GAP SERPL CALC-SCNC: 13 MMOL/L — SIGNIFICANT CHANGE UP (ref 5–17)
BASOPHILS # BLD AUTO: 0.03 K/UL — SIGNIFICANT CHANGE UP (ref 0–0.2)
BASOPHILS NFR BLD AUTO: 0.3 % — SIGNIFICANT CHANGE UP (ref 0–2)
BUN SERPL-MCNC: 30 MG/DL — HIGH (ref 7–23)
CALCIUM SERPL-MCNC: 8.9 MG/DL — SIGNIFICANT CHANGE UP (ref 8.4–10.5)
CHLORIDE SERPL-SCNC: 93 MMOL/L — LOW (ref 96–108)
CO2 SERPL-SCNC: 28 MMOL/L — SIGNIFICANT CHANGE UP (ref 22–31)
CREAT SERPL-MCNC: 4.47 MG/DL — HIGH (ref 0.5–1.3)
EOSINOPHIL # BLD AUTO: 0.39 K/UL — SIGNIFICANT CHANGE UP (ref 0–0.5)
EOSINOPHIL NFR BLD AUTO: 3.8 % — SIGNIFICANT CHANGE UP (ref 0–6)
GLUCOSE SERPL-MCNC: 119 MG/DL — HIGH (ref 70–99)
HCT VFR BLD CALC: 27.6 % — LOW (ref 39–50)
HGB BLD-MCNC: 8.6 G/DL — LOW (ref 13–17)
IMM GRANULOCYTES NFR BLD AUTO: 1.6 % — HIGH (ref 0–1.5)
LYMPHOCYTES # BLD AUTO: 2.1 K/UL — SIGNIFICANT CHANGE UP (ref 1–3.3)
LYMPHOCYTES # BLD AUTO: 20.5 % — SIGNIFICANT CHANGE UP (ref 13–44)
MCHC RBC-ENTMCNC: 29.2 PG — SIGNIFICANT CHANGE UP (ref 27–34)
MCHC RBC-ENTMCNC: 31.2 GM/DL — LOW (ref 32–36)
MCV RBC AUTO: 93.6 FL — SIGNIFICANT CHANGE UP (ref 80–100)
MONOCYTES # BLD AUTO: 0.78 K/UL — SIGNIFICANT CHANGE UP (ref 0–0.9)
MONOCYTES NFR BLD AUTO: 7.6 % — SIGNIFICANT CHANGE UP (ref 2–14)
NEUTROPHILS # BLD AUTO: 6.79 K/UL — SIGNIFICANT CHANGE UP (ref 1.8–7.4)
NEUTROPHILS NFR BLD AUTO: 66.2 % — SIGNIFICANT CHANGE UP (ref 43–77)
PLATELET # BLD AUTO: 365 K/UL — SIGNIFICANT CHANGE UP (ref 150–400)
POTASSIUM SERPL-MCNC: 3.3 MMOL/L — LOW (ref 3.5–5.3)
POTASSIUM SERPL-SCNC: 3.3 MMOL/L — LOW (ref 3.5–5.3)
RBC # BLD: 2.95 M/UL — LOW (ref 4.2–5.8)
RBC # FLD: 14.4 % — SIGNIFICANT CHANGE UP (ref 10.3–14.5)
SODIUM SERPL-SCNC: 134 MMOL/L — LOW (ref 135–145)
WBC # BLD: 10.25 K/UL — SIGNIFICANT CHANGE UP (ref 3.8–10.5)
WBC # FLD AUTO: 10.25 K/UL — SIGNIFICANT CHANGE UP (ref 3.8–10.5)

## 2020-01-12 PROCEDURE — 99232 SBSQ HOSP IP/OBS MODERATE 35: CPT

## 2020-01-12 RX ORDER — LANOLIN ALCOHOL/MO/W.PET/CERES
3 CREAM (GRAM) TOPICAL ONCE
Refills: 0 | Status: COMPLETED | OUTPATIENT
Start: 2020-01-12 | End: 2020-01-12

## 2020-01-12 RX ORDER — POTASSIUM CHLORIDE 20 MEQ
10 PACKET (EA) ORAL
Refills: 0 | Status: DISCONTINUED | OUTPATIENT
Start: 2020-01-12 | End: 2020-01-12

## 2020-01-12 RX ORDER — INSULIN LISPRO 100/ML
VIAL (ML) SUBCUTANEOUS EVERY 6 HOURS
Refills: 0 | Status: DISCONTINUED | OUTPATIENT
Start: 2020-01-12 | End: 2020-01-14

## 2020-01-12 RX ADMIN — Medication 4: at 12:20

## 2020-01-12 RX ADMIN — Medication 1 TABLET(S): at 11:57

## 2020-01-12 RX ADMIN — LACOSAMIDE 50 MILLIGRAM(S): 50 TABLET ORAL at 06:36

## 2020-01-12 RX ADMIN — Medication 100 MILLIEQUIVALENT(S): at 09:27

## 2020-01-12 RX ADMIN — LACOSAMIDE 50 MILLIGRAM(S): 50 TABLET ORAL at 17:47

## 2020-01-12 RX ADMIN — LEVETIRACETAM 250 MILLIGRAM(S): 250 TABLET, FILM COATED ORAL at 06:31

## 2020-01-12 RX ADMIN — CARVEDILOL PHOSPHATE 6.25 MILLIGRAM(S): 80 CAPSULE, EXTENDED RELEASE ORAL at 06:30

## 2020-01-12 RX ADMIN — Medication 50 MICROGRAM(S): at 06:30

## 2020-01-12 RX ADMIN — LEVETIRACETAM 125 MILLIGRAM(S): 250 TABLET, FILM COATED ORAL at 11:57

## 2020-01-12 RX ADMIN — Medication 650 MILLIGRAM(S): at 10:10

## 2020-01-12 RX ADMIN — Medication 2: at 17:39

## 2020-01-12 RX ADMIN — FLUCONAZOLE 100 MILLIGRAM(S): 150 TABLET ORAL at 14:50

## 2020-01-12 RX ADMIN — Medication 667 MILLIGRAM(S): at 06:30

## 2020-01-12 RX ADMIN — Medication 650 MILLIGRAM(S): at 09:40

## 2020-01-12 RX ADMIN — APIXABAN 5 MILLIGRAM(S): 2.5 TABLET, FILM COATED ORAL at 17:39

## 2020-01-12 RX ADMIN — SIMETHICONE 80 MILLIGRAM(S): 80 TABLET, CHEWABLE ORAL at 06:30

## 2020-01-12 RX ADMIN — CARVEDILOL PHOSPHATE 6.25 MILLIGRAM(S): 80 CAPSULE, EXTENDED RELEASE ORAL at 17:39

## 2020-01-12 RX ADMIN — Medication 650 MILLIGRAM(S): at 22:30

## 2020-01-12 RX ADMIN — Medication 650 MILLIGRAM(S): at 21:47

## 2020-01-12 RX ADMIN — APIXABAN 5 MILLIGRAM(S): 2.5 TABLET, FILM COATED ORAL at 06:30

## 2020-01-12 RX ADMIN — Medication 3 MILLILITER(S): at 23:32

## 2020-01-12 RX ADMIN — LEVETIRACETAM 250 MILLIGRAM(S): 250 TABLET, FILM COATED ORAL at 17:39

## 2020-01-12 RX ADMIN — INSULIN GLARGINE 12 UNIT(S): 100 INJECTION, SOLUTION SUBCUTANEOUS at 22:32

## 2020-01-12 RX ADMIN — SIMETHICONE 80 MILLIGRAM(S): 80 TABLET, CHEWABLE ORAL at 17:39

## 2020-01-12 RX ADMIN — Medication 667 MILLIGRAM(S): at 21:48

## 2020-01-12 RX ADMIN — Medication 3 MILLIGRAM(S): at 21:47

## 2020-01-12 RX ADMIN — Medication 8: at 23:40

## 2020-01-12 RX ADMIN — Medication 667 MILLIGRAM(S): at 14:50

## 2020-01-12 RX ADMIN — ATORVASTATIN CALCIUM 80 MILLIGRAM(S): 80 TABLET, FILM COATED ORAL at 21:48

## 2020-01-12 RX ADMIN — CLOPIDOGREL BISULFATE 75 MILLIGRAM(S): 75 TABLET, FILM COATED ORAL at 11:57

## 2020-01-12 NOTE — PROGRESS NOTE ADULT - SUBJECTIVE AND OBJECTIVE BOX
[facility] [place] [facility]   MD NINA MASON MD RUORU WONG, PA    TEL:  OFFICE: [phone]  DR HSU CELL: [phone]  RAS MORELOS CELL: [phone]  DR. MARQEUZ CELL: [phone]  DR. ALEX CELL: [phone]    FROM 5 PM - 7 AM PLEASE CALL ANSWERING SERVICE: [phone]    RENAL FOLLOW UP NOTE  --------------------------------------------------------------------------------  HPI:      Pt seen and examined at bedside.   Denies SOB, chest pain     PAST HISTORY  --------------------------------------------------------------------------------  No significant changes to PMH, PSH, FHx, SHx, unless otherwise noted    ALLERGIES & MEDICATIONS  --------------------------------------------------------------------------------  Allergies    No Known Allergies    Intolerances      Standing Inpatient Medications  apixaban 5 milliGRAM(s) Oral two times a day  atorvastatin 80 milliGRAM(s) Oral at bedtime  calcium acetate 667 milliGRAM(s) Oral three times a day with meals  carvedilol 6.25 milliGRAM(s) Oral every 12 hours  chlorhexidine 4% Liquid 1 Application(s) Topical <User Schedule>  clopidogrel Tablet 75 milliGRAM(s) Oral daily  dextrose 5%. 1000 milliLiter(s) IV Continuous <Continuous>  dextrose 50% Injectable 12.5 Gram(s) IV Push once  dextrose 50% Injectable 25 Gram(s) IV Push once  famotidine   Suspension 20 milliGRAM(s) Oral every 48 hours  fluconAZOLE   40 mG/mL Suspension 100 milliGRAM(s) Enteral Tube daily  insulin glargine Injectable (LANTUS) 12 Unit(s) SubCutaneous at bedtime  insulin lispro (HumaLOG) corrective regimen sliding scale   SubCutaneous four times a day before meals  insulin lispro (HumaLOG) corrective regimen sliding scale   SubCutaneous at bedtime  lacosamide Solution 50 milliGRAM(s) Oral two times a day  lactobacillus acidophilus 1 Tablet(s) Oral daily  levETIRAcetam  Solution 250 milliGRAM(s) Oral two times a day  levETIRAcetam  Solution 125 milliGRAM(s) Oral daily  levothyroxine 50 MICROGram(s) Oral daily  pantoprazole  Injectable 40 milliGRAM(s) IV Push once  potassium chloride  10 mEq/100 mL IVPB 10 milliEquivalent(s) IV Intermittent every 1 hour  simethicone 80 milliGRAM(s) Chew two times a day    PRN Inpatient Medications  acetaminophen   Tablet .. 650 milliGRAM(s) Oral every 6 hours PRN  albuterol/ipratropium for Nebulization 3 milliLiter(s) Nebulizer every 6 hours PRN  bisacodyl Suppository 10 milliGRAM(s) Rectal daily PRN  dextrose 40% Gel 15 Gram(s) Oral once PRN  glucagon  Injectable 1 milliGRAM(s) IntraMuscular once PRN      REVIEW OF SYSTEMS  --------------------------------------------------------------------------------  General: no fever  CVS: no chest pain  RESP: no sob, no cough  ABD: no abdominal pain  MSK: no edema     VITALS/PHYSICAL EXAM  --------------------------------------------------------------------------------  T(C): 36.5 (01-12-20 @ 08:09), Max: 36.9 (01-12-20 @ 04:45)  HR: 72 (01-12-20 @ 08:09) (67 - 79)  BP: 150/71 (01-12-20 @ 08:09) (122/66 - 161/73)  RR: 16 (01-12-20 @ 08:09) (16 - 20)  SpO2: 100% (01-12-20 @ 08:09) (96% - 100%)  Wt(kg): --        01-11-20 @ 07:01  -  01-12-20 @ 07:00  --------------------------------------------------------  IN: 2115 mL / OUT: 1802 mL / NET: 313 mL      Physical Exam:  	Gen: NAD  	HEENT: MMM  	Pulm: CTA B/L  	CV: S1S2  	Abd: Soft, +BS  	Ext: No LE edema B/L                      Neuro: Awake   	Skin: Warm and Dry   	Vascular access:  tunnnelled HD catheter     LABS/STUDIES  --------------------------------------------------------------------------------    134  |  93  |  30  ----------------------------<  119      [01-12-20 @ 06:32]  3.3   |  28  |  4.47        Ca     8.9     [01-12-20 @ 06:32]            Creatinine Trend:  SCr 4.47 [01-12 @ 06:32]  SCr 6.17 [01-11 @ 06:04]  SCr 4.72 [01-10 @ 06:01]  SCr 7.14 [01-09 @ 05:19]  SCr 5.62 [01-08 @ 05:58]    Urinalysis - [01-02-20 @ 20:34]      Color Light Yellow / Appearance Slightly Turbid / SG 1.012 / pH 7.0      Gluc 100 mg/dL / Ketone Negative  / Bili Negative / Urobili Negative       Blood Small / Protein 300 mg/dL / Leuk Est Moderate / Nitrite Negative      RBC 1 / WBC 46 / Hyaline 1 / Gran  / Sq Epi  / Non Sq Epi 0 / Bacteria Many      Iron 45, TIBC 145, %sat 31      [12-26-19 @ 23:24]  Ferritin 1369      [12-26-19 @ 23:24]  PTH -- (Ca 6.9)      [06-05-19 @ 08:14]   562  HbA1c 8.4      [12-15-19 @ 11:00]  TSH 6.22      [01-03-20 @ 10:03]  Lipid: chol 134, , HDL 56, LDL 52      [12-15-19 @ 10:56]    HBsAb >1000.0      [12-14-19 @ 23:48]  HBsAg Nonreact      [12-14-19 @ 23:48]  HCV 0.15, Nonreact      [12-14-19 @ 23:48]

## 2020-01-12 NOTE — PROGRESS NOTE ADULT - SUBJECTIVE AND OBJECTIVE BOX
Patient is a 64y old  Male who presents with a chief complaint of Seizures (10 Herve 2020 12:20)      SUBJECTIVE / OVERNIGHT EVENTS: none acute  ADDITIONAL REVIEW OF SYSTEMS: pt feels better, no fevers/chills, no cough, no sob, no abd pain.     T(C): 36.5 (01-12-20 @ 19:57), Max: 36.7 (01-12-20 @ 16:17)  HR: 71 (01-12-20 @ 19:57) (67 - 76)  BP: 109/60 (01-12-20 @ 19:57) (109/60 - 154/75)  RR: 18 (01-12-20 @ 19:57) (18 - 20)  SpO2: 98% (01-12-20 @ 19:57) (97% - 100%)      MEDICATIONS  (STANDING):  apixaban 5 milliGRAM(s) Oral two times a day  atorvastatin 80 milliGRAM(s) Oral at bedtime  calcium acetate 667 milliGRAM(s) Oral three times a day with meals  carvedilol 6.25 milliGRAM(s) Oral every 12 hours  chlorhexidine 4% Liquid 1 Application(s) Topical <User Schedule>  clopidogrel Tablet 75 milliGRAM(s) Oral daily  dextrose 5%. 1000 milliLiter(s) (50 mL/Hr) IV Continuous <Continuous>  dextrose 50% Injectable 12.5 Gram(s) IV Push once  dextrose 50% Injectable 25 Gram(s) IV Push once  famotidine   Suspension 20 milliGRAM(s) Oral every 48 hours  fluconAZOLE   40 mG/mL Suspension 100 milliGRAM(s) Enteral Tube daily  insulin glargine Injectable (LANTUS) 12 Unit(s) SubCutaneous at bedtime  insulin lispro (HumaLOG) corrective regimen sliding scale   SubCutaneous every 6 hours  lacosamide Solution 50 milliGRAM(s) Oral two times a day  lactobacillus acidophilus 1 Tablet(s) Oral daily  levETIRAcetam  Solution 250 milliGRAM(s) Oral two times a day  levETIRAcetam  Solution 125 milliGRAM(s) Oral daily  levothyroxine 50 MICROGram(s) Oral daily  melatonin 3 milliGRAM(s) Oral once  pantoprazole  Injectable 40 milliGRAM(s) IV Push once  simethicone 80 milliGRAM(s) Chew two times a day    MEDICATIONS  (PRN):  acetaminophen   Tablet .. 650 milliGRAM(s) Oral every 6 hours PRN Moderate Pain (4 - 6)  albuterol/ipratropium for Nebulization 3 milliLiter(s) Nebulizer every 6 hours PRN Shortness of Breath and/or Wheezing  bisacodyl Suppository 10 milliGRAM(s) Rectal daily PRN Constipation  dextrose 40% Gel 15 Gram(s) Oral once PRN Blood Glucose LESS THAN 70 milliGRAM(s)/deciliter  glucagon  Injectable 1 milliGRAM(s) IntraMuscular once PRN Glucose <70 milliGRAM(s)/deciLiter    PHYSICAL EXAM:  CONSTITUTIONAL: NAD, well-developed, well-groomed  EYES: PERRLA; conjunctiva and sclera clear  ENMT: Moist oral mucosa, no pharyngeal injection or exudates; normal dentition  NECK: Supple, no palpable masses; no thyromegaly  RESPIRATORY: Normal respiratory effort; lungs are clear to auscultation bilaterally  CARDIOVASCULAR: Regular rate and rhythm, normal S1 and S2, no murmur/rub/gallop; No lower extremity edema; Peripheral pulses are 2+ bilaterally  ABDOMEN: Nontender to palpation, normoactive bowel sounds, no rebound/guarding; No hepatosplenomegaly, +peg site clean  MUSCLOSKELETAL:  Normal gait; no clubbing or cyanosis of digits; no joint swelling or tenderness to palpation  PSYCH: A+O to person, place, and time; affect appropriate  NEUROLOGY: CN 2-12 are intact and symmetric; no gross sensory deficits; +mild L facial droop, +B/L LE weakness R> L  SKIN: No rashes; no palpable lesions                              8.6    10.25 )-----------( 365      ( 12 Jan 2020 09:46 )             27.6               134|93|30<119  3.3|28|4.47  8.9,--,--  01-12 @ 06:32    COORDINATION OF CARE:  Care Discussed with Consultants/Other Providers [Y/N]: Dr. sifuentes

## 2020-01-12 NOTE — PROGRESS NOTE ADULT - ASSESSMENT
63 yo male with ESRD on HD (M-W-FRI without AVF as awaiting renal transplant expected within 3 months; Dr. Barrientos; [facility]), CAD s/p stent 2009 and CARLA in June 2019, HTN, DM type 2, hypothyroidism and left carotid stenosis admitted for altered mental status during dialysis, he became unresponsive and arm not moving therefore he was sent to hospital. At [facility], he received tPA on 12/13/19. Was noted to have SAH after tPA. Started on 3% saline. Had seizure, despite multiple AEDs remained in status epilepticus and was transferred to [facility] for continuous EEG monitoring. s/p extubation, PEG tube placement. Is awake and following commands. Nephrology is following for ESRD management.    ESRD on HD MWF via tunnelled HD catheter   - HD consent in the chart  - Last HD 1/10 with 1 L UF   Plan for HD on MOnday, to be swithed to MWF schedule   - please dose Keppra post HD on HD days      - needs rehab w/ onsite HD    Hyponatremia  - from increased free water intake  - Use minimum possible free water flushes  - should improve w/ HD    Anemia  - unable to use epogen due to CVA  - transfuse PRN Hgb <7 or hemodynamically significant bleeding  - no evidence of occult bleeding  - GI following     HTN  -BP stable  - Monitor closely    Fever   -resolved  -s/p CT ABd   - PT also with permacath - 63 yo male with ESRD on HD (M-W-FRI without AVF as awaiting renal transplant expected within 3 months; Dr. Barrientos; [facility]), CAD s/p stent 2009 and CARLA in June 2019, HTN, DM type 2, hypothyroidism and left carotid stenosis admitted for altered mental status during dialysis, he became unresponsive and arm not moving therefore he was sent to hospital. At [facility], he received tPA on 12/13/19. Was noted to have SAH after tPA. Started on 3% saline. Had seizure, despite multiple AEDs remained in status epilepticus and was transferred to [facility] for continuous EEG monitoring. s/p extubation, PEG tube placement. Is awake and following commands. Nephrology is following for ESRD management.    ESRD on HD MWF via tunnelled HD catheter   - HD consent in the chart  - Last HD 1/10 with 1 L UF   Plan for HD on MOnday, to be swithed to MWF schedule   -Please do not replete potassium today, HD tomorrow with high K bath  - please dose Keppra post HD on HD days      - needs rehab w/ onsite HD    Hyponatremia  - from increased free water intake  - Use minimum possible free water flushes  - should improve w/ HD    Anemia  - unable to use epogen due to CVA  - transfuse PRN Hgb <7 or hemodynamically significant bleeding  - no evidence of occult bleeding  - GI following     HTN  -BP stable  - Monitor closely    Fever   -resolved  -s/p CT ABd   - PT also with permacath -

## 2020-01-12 NOTE — PROGRESS NOTE ADULT - ASSESSMENT
64 M hx of CAD s/p 2009, 2019, HTN, DM2, carotid stenosis, ESRD on HD admitted as transfer on mechanical ventilation for status epilepticus in setting of new embolic CVA At [facility], he received tPA on 12/13/19, was noted to have SAH after tPA and hyponatremia, s/p hypertonic saline, course complicated by status epilepticus and intubated for airway protection, subsequently transferred to [facility] for further management, s/p extubation, course c/b dysphagia and failed S&S eval s/p PEG tube placement, and septic shock attributed to complicated UTI and refractory cough likely from ongoing aspiration, now clinically stable awaiting xfer to rehab

## 2020-01-13 LAB
ANION GAP SERPL CALC-SCNC: 19 MMOL/L — HIGH (ref 5–17)
BUN SERPL-MCNC: 46 MG/DL — HIGH (ref 7–23)
CALCIUM SERPL-MCNC: 8.9 MG/DL — SIGNIFICANT CHANGE UP (ref 8.4–10.5)
CHLORIDE SERPL-SCNC: 88 MMOL/L — LOW (ref 96–108)
CO2 SERPL-SCNC: 25 MMOL/L — SIGNIFICANT CHANGE UP (ref 22–31)
CREAT SERPL-MCNC: 5.9 MG/DL — HIGH (ref 0.5–1.3)
GLUCOSE SERPL-MCNC: 81 MG/DL — SIGNIFICANT CHANGE UP (ref 70–99)
HCT VFR BLD CALC: 27 % — LOW (ref 39–50)
HGB BLD-MCNC: 8.6 G/DL — LOW (ref 13–17)
MCHC RBC-ENTMCNC: 29.4 PG — SIGNIFICANT CHANGE UP (ref 27–34)
MCHC RBC-ENTMCNC: 31.9 GM/DL — LOW (ref 32–36)
MCV RBC AUTO: 92.2 FL — SIGNIFICANT CHANGE UP (ref 80–100)
PLATELET # BLD AUTO: 359 K/UL — SIGNIFICANT CHANGE UP (ref 150–400)
POTASSIUM SERPL-MCNC: 3.8 MMOL/L — SIGNIFICANT CHANGE UP (ref 3.5–5.3)
POTASSIUM SERPL-SCNC: 3.8 MMOL/L — SIGNIFICANT CHANGE UP (ref 3.5–5.3)
RBC # BLD: 2.93 M/UL — LOW (ref 4.2–5.8)
RBC # FLD: 14.6 % — HIGH (ref 10.3–14.5)
SODIUM SERPL-SCNC: 132 MMOL/L — LOW (ref 135–145)
WBC # BLD: 11.78 K/UL — HIGH (ref 3.8–10.5)
WBC # FLD AUTO: 11.78 K/UL — HIGH (ref 3.8–10.5)

## 2020-01-13 PROCEDURE — 99233 SBSQ HOSP IP/OBS HIGH 50: CPT

## 2020-01-13 RX ORDER — LANOLIN ALCOHOL/MO/W.PET/CERES
5 CREAM (GRAM) TOPICAL ONCE
Refills: 0 | Status: COMPLETED | OUTPATIENT
Start: 2020-01-13 | End: 2020-01-13

## 2020-01-13 RX ORDER — ZALEPLON 10 MG
5 CAPSULE ORAL ONCE
Refills: 0 | Status: DISCONTINUED | OUTPATIENT
Start: 2020-01-13 | End: 2020-01-13

## 2020-01-13 RX ADMIN — LACOSAMIDE 50 MILLIGRAM(S): 50 TABLET ORAL at 05:09

## 2020-01-13 RX ADMIN — ATORVASTATIN CALCIUM 80 MILLIGRAM(S): 80 TABLET, FILM COATED ORAL at 21:38

## 2020-01-13 RX ADMIN — CLOPIDOGREL BISULFATE 75 MILLIGRAM(S): 75 TABLET, FILM COATED ORAL at 13:12

## 2020-01-13 RX ADMIN — Medication 667 MILLIGRAM(S): at 13:15

## 2020-01-13 RX ADMIN — Medication 5 MILLIGRAM(S): at 23:19

## 2020-01-13 RX ADMIN — Medication 2: at 19:02

## 2020-01-13 RX ADMIN — LEVETIRACETAM 125 MILLIGRAM(S): 250 TABLET, FILM COATED ORAL at 14:35

## 2020-01-13 RX ADMIN — SIMETHICONE 80 MILLIGRAM(S): 80 TABLET, CHEWABLE ORAL at 16:43

## 2020-01-13 RX ADMIN — Medication 50 MICROGRAM(S): at 05:10

## 2020-01-13 RX ADMIN — CARVEDILOL PHOSPHATE 6.25 MILLIGRAM(S): 80 CAPSULE, EXTENDED RELEASE ORAL at 16:41

## 2020-01-13 RX ADMIN — FAMOTIDINE 20 MILLIGRAM(S): 10 INJECTION INTRAVENOUS at 13:15

## 2020-01-13 RX ADMIN — LEVETIRACETAM 250 MILLIGRAM(S): 250 TABLET, FILM COATED ORAL at 05:10

## 2020-01-13 RX ADMIN — LACOSAMIDE 50 MILLIGRAM(S): 50 TABLET ORAL at 16:41

## 2020-01-13 RX ADMIN — LEVETIRACETAM 250 MILLIGRAM(S): 250 TABLET, FILM COATED ORAL at 16:42

## 2020-01-13 RX ADMIN — SIMETHICONE 80 MILLIGRAM(S): 80 TABLET, CHEWABLE ORAL at 05:10

## 2020-01-13 RX ADMIN — APIXABAN 5 MILLIGRAM(S): 2.5 TABLET, FILM COATED ORAL at 16:44

## 2020-01-13 RX ADMIN — INSULIN GLARGINE 12 UNIT(S): 100 INJECTION, SOLUTION SUBCUTANEOUS at 21:39

## 2020-01-13 RX ADMIN — Medication 4: at 23:40

## 2020-01-13 RX ADMIN — Medication 1 TABLET(S): at 14:35

## 2020-01-13 RX ADMIN — APIXABAN 5 MILLIGRAM(S): 2.5 TABLET, FILM COATED ORAL at 05:10

## 2020-01-13 RX ADMIN — Medication 5 MILLIGRAM(S): at 21:37

## 2020-01-13 RX ADMIN — Medication 3 MILLILITER(S): at 05:38

## 2020-01-13 RX ADMIN — Medication 667 MILLIGRAM(S): at 21:38

## 2020-01-13 RX ADMIN — Medication 667 MILLIGRAM(S): at 05:10

## 2020-01-13 NOTE — PROGRESS NOTE ADULT - ASSESSMENT
64 M hx of CAD s/p 2009, 2019, HTN, DM2, carotid stenosis, ESRD on HD admitted as transfer on mechanical ventilation for status epilepticus in setting of new embolic CVA At [facility], he received tPA on 12/13/19, was noted to have SAH after tPA and hyponatremia, s/p hypertonic saline, course complicated by status epilepticus and intubated for airway protection, subsequently transferred to [facility] for further management, s/p extubation, course c/b dysphagia and failed S&S eval s/p PEG tube placement, and septic shock attributed to complicated UTI and refractory cough likely from ongoing aspiration, now clinically stable awaiting xfer to rehab

## 2020-01-13 NOTE — PROGRESS NOTE ADULT - ASSESSMENT
63 yo male with ESRD on HD (M-W-FRI without AVF as awaiting renal transplant expected within 3 months; Dr. Barrientos; [facility]), CAD s/p stent 2009 and CARLA in June 2019, HTN, DM type 2, hypothyroidism and left carotid stenosis admitted for altered mental status during dialysis, he became unresponsive and arm not moving therefore he was sent to hospital. At [facility] he received tPA on 12/13/19. Was noted to have SAH after tPA. Started on 3% saline. Had seizure, despite multiple AEDs remained in status epilepticus and was transferred to [facility] for continuous EEG monitoring. s/p extubation, PEG tube placement. Is awake and following commands. Nephrology is following for ESRD management.    ESRD on HD MWF via tunnelled HD catheter   - HD consent in the chart  - Last HD 1/13 with 1 L UF     - please dose Keppra post HD on HD days      - needs rehab w/ onsite HD    Hyponatremia  - from increased free water intake  - Use minimum possible free water flushes  - should improve w/ HD    Anemia  - unable to use epogen due to CVA  - transfuse PRN Hgb <7 or hemodynamically significant bleeding  - no evidence of occult bleeding  - GI following     HTN  -BP stable  - Monitor closely    Fever   -resolved  -s/p CT ABd   - PT also with permacath -

## 2020-01-13 NOTE — PROGRESS NOTE ADULT - SUBJECTIVE AND OBJECTIVE BOX
Shen De Dios MD  Division of Hospital Medicine  Pager: [phone]  If no response or off-hours, page [phone]  -------------------------------------    Patient is a 64y old  Male who presents with a chief complaint of Seizures (13 Jan 2020 15:37)      SUBJECTIVE / OVERNIGHT EVENTS: none acute  ADDITIONAL REVIEW OF SYSTEMS: pt feels well, no aches/pains, no fevers/chills, no sob, +ongoing cough    MEDICATIONS  (STANDING):  apixaban 5 milliGRAM(s) Oral two times a day  atorvastatin 80 milliGRAM(s) Oral at bedtime  calcium acetate 667 milliGRAM(s) Oral three times a day with meals  carvedilol 6.25 milliGRAM(s) Oral every 12 hours  chlorhexidine 4% Liquid 1 Application(s) Topical <User Schedule>  clopidogrel Tablet 75 milliGRAM(s) Oral daily  dextrose 5%. 1000 milliLiter(s) (50 mL/Hr) IV Continuous <Continuous>  dextrose 50% Injectable 12.5 Gram(s) IV Push once  dextrose 50% Injectable 25 Gram(s) IV Push once  famotidine   Suspension 20 milliGRAM(s) Oral every 48 hours  fluconAZOLE   40 mG/mL Suspension 100 milliGRAM(s) Enteral Tube daily  insulin glargine Injectable (LANTUS) 12 Unit(s) SubCutaneous at bedtime  insulin lispro (HumaLOG) corrective regimen sliding scale   SubCutaneous every 6 hours  lacosamide Solution 50 milliGRAM(s) Oral two times a day  lactobacillus acidophilus 1 Tablet(s) Oral daily  levETIRAcetam  Solution 250 milliGRAM(s) Oral two times a day  levETIRAcetam  Solution 125 milliGRAM(s) Oral daily  levothyroxine 50 MICROGram(s) Oral daily  pantoprazole  Injectable 40 milliGRAM(s) IV Push once  simethicone 80 milliGRAM(s) Chew two times a day    MEDICATIONS  (PRN):  acetaminophen   Tablet .. 650 milliGRAM(s) Oral every 6 hours PRN Moderate Pain (4 - 6)  albuterol/ipratropium for Nebulization 3 milliLiter(s) Nebulizer every 6 hours PRN Shortness of Breath and/or Wheezing  bisacodyl Suppository 10 milliGRAM(s) Rectal daily PRN Constipation  dextrose 40% Gel 15 Gram(s) Oral once PRN Blood Glucose LESS THAN 70 milliGRAM(s)/deciliter  glucagon  Injectable 1 milliGRAM(s) IntraMuscular once PRN Glucose <70 milliGRAM(s)/deciLiter      CAPILLARY BLOOD GLUCOSE      POCT Blood Glucose.: 133 mg/dL (13 Jan 2020 12:51)  POCT Blood Glucose.: 92 mg/dL (13 Jan 2020 05:24)  POCT Blood Glucose.: 348 mg/dL (12 Jan 2020 23:39)  POCT Blood Glucose.: 333 mg/dL (12 Jan 2020 23:37)  POCT Blood Glucose.: 267 mg/dL (12 Jan 2020 21:47)  POCT Blood Glucose.: 200 mg/dL (12 Jan 2020 17:17)    I&O's Summary    12 Jan 2020 07:01  -  13 Jan 2020 07:00  --------------------------------------------------------  IN: 119 mL / OUT: 0 mL / NET: 119 mL    13 Jan 2020 07:01  -  13 Jan 2020 16:03  --------------------------------------------------------  IN: 800 mL / OUT: 1800 mL / NET: -1000 mL        PHYSICAL EXAM:  Vital Signs Last 24 Hrs  T(C): 36.9 (13 Jan 2020 15:30), Max: 37 (12 Jan 2020 23:21)  T(F): 98.5 (13 Jan 2020 15:30), Max: 98.6 (12 Jan 2020 23:21)  HR: 79 (13 Jan 2020 15:30) (67 - 82)  BP: 150/71 (13 Jan 2020 15:30) (109/60 - 153/75)  BP(mean): --  RR: 18 (13 Jan 2020 15:30) (18 - 19)  SpO2: 97% (13 Jan 2020 15:30) (95% - 98%)  CONSTITUTIONAL: NAD, well-developed, well-groomed  EYES: PERRLA; conjunctiva and sclera clear  ENMT: Moist oral mucosa, no pharyngeal injection or exudates; normal dentition  NECK: Supple, no palpable masses; no thyromegaly  RESPIRATORY: Normal respiratory effort; clear bilaterally  CARDIOVASCULAR: Regular rate and rhythm, normal S1 and S2, no murmur/rub/gallop; No lower extremity edema; Peripheral pulses are 2+ bilaterally  ABDOMEN: Nontender to palpation, normoactive bowel sounds, no rebound/guarding; No hepatosplenomegaly, +PEG site clean  MUSCLOSKELETAL:  Normal gait; no clubbing or cyanosis of digits; no joint swelling or tenderness to palpation  PSYCH: A+O to person, place, and time; affect appropriate  NEUROLOGY: CN 2-12 are intact and symmetric; no gross sensory deficits; +mild L facial droop, +B/L LE weakness  SKIN: No rashes; no palpable lesions    LABS:                        8.6    11.78 )-----------( 359      ( 13 Jan 2020 08:44 )             27.0     01-13    132<L>  |  88<L>  |  46<H>  ----------------------------<  81  3.8   |  25  |  5.90<H>    Ca    8.9      13 Jan 2020 06:26

## 2020-01-13 NOTE — PROGRESS NOTE ADULT - SUBJECTIVE AND OBJECTIVE BOX
Follow-up Pulm Progress Note    No new respiratory events overnight.  Denies SOB/CP.   97% on RA    Medications:  MEDICATIONS  (STANDING):  apixaban 5 milliGRAM(s) Oral two times a day  atorvastatin 80 milliGRAM(s) Oral at bedtime  calcium acetate 667 milliGRAM(s) Oral three times a day with meals  carvedilol 6.25 milliGRAM(s) Oral every 12 hours  chlorhexidine 4% Liquid 1 Application(s) Topical <User Schedule>  clopidogrel Tablet 75 milliGRAM(s) Oral daily  dextrose 5%. 1000 milliLiter(s) (50 mL/Hr) IV Continuous <Continuous>  dextrose 50% Injectable 12.5 Gram(s) IV Push once  dextrose 50% Injectable 25 Gram(s) IV Push once  famotidine   Suspension 20 milliGRAM(s) Oral every 48 hours  fluconAZOLE   40 mG/mL Suspension 100 milliGRAM(s) Enteral Tube daily  insulin glargine Injectable (LANTUS) 12 Unit(s) SubCutaneous at bedtime  insulin lispro (HumaLOG) corrective regimen sliding scale   SubCutaneous every 6 hours  lacosamide Solution 50 milliGRAM(s) Oral two times a day  lactobacillus acidophilus 1 Tablet(s) Oral daily  levETIRAcetam  Solution 250 milliGRAM(s) Oral two times a day  levETIRAcetam  Solution 125 milliGRAM(s) Oral daily  levothyroxine 50 MICROGram(s) Oral daily  pantoprazole  Injectable 40 milliGRAM(s) IV Push once  simethicone 80 milliGRAM(s) Chew two times a day    MEDICATIONS  (PRN):  acetaminophen   Tablet .. 650 milliGRAM(s) Oral every 6 hours PRN Moderate Pain (4 - 6)  albuterol/ipratropium for Nebulization 3 milliLiter(s) Nebulizer every 6 hours PRN Shortness of Breath and/or Wheezing  bisacodyl Suppository 10 milliGRAM(s) Rectal daily PRN Constipation  dextrose 40% Gel 15 Gram(s) Oral once PRN Blood Glucose LESS THAN 70 milliGRAM(s)/deciliter  glucagon  Injectable 1 milliGRAM(s) IntraMuscular once PRN Glucose <70 milliGRAM(s)/deciLiter          Vital Signs Last 24 Hrs  T(C): 36.4 (13 Jan 2020 11:50), Max: 37 (12 Jan 2020 23:21)  T(F): 97.5 (13 Jan 2020 11:50), Max: 98.6 (12 Jan 2020 23:21)  HR: 76 (13 Jan 2020 11:50) (67 - 82)  BP: 139/68 (13 Jan 2020 11:50) (109/60 - 153/75)  BP(mean): --  RR: 18 (13 Jan 2020 11:50) (18 - 19)  SpO2: 98% (13 Jan 2020 11:50) (95% - 98%) on RA          01-12 @ 07:01  -  01-13 @ 07:00  --------------------------------------------------------  IN: 119 mL / OUT: 0 mL / NET: 119 mL          LABS:                        8.6    11.78 )-----------( 359      ( 13 Jan 2020 08:44 )             27.0     01-13    132<L>  |  88<L>  |  46<H>  ----------------------------<  81  3.8   |  25  |  5.90<H>    Ca    8.9      13 Jan 2020 06:26            CAPILLARY BLOOD GLUCOSE      POCT Blood Glucose.: 133 mg/dL (13 Jan 2020 12:51)        Physical Examination:  PULM: Clear to auscultation bilaterally, no significant sputum production  CVS: S1, S2 heard    RADIOLOGY REVIEWED  CXR: 1/8: Mild pulm edema

## 2020-01-13 NOTE — PROGRESS NOTE ADULT - SUBJECTIVE AND OBJECTIVE BOX
INTERVAL HPI/OVERNIGHT EVENTS:    patient seen and examined at bedside with family present   he denies abdominal pain, n/v  tolerating bolus feeds  s/p HD today   labs noted     MEDICATIONS  (STANDING):  albuterol/ipratropium for Nebulization 3 milliLiter(s) Nebulizer every 6 hours  apixaban 5 milliGRAM(s) Oral two times a day  atorvastatin 80 milliGRAM(s) Oral at bedtime  calcium acetate 667 milliGRAM(s) Oral three times a day with meals  carvedilol 6.25 milliGRAM(s) Oral every 12 hours  chlorhexidine 4% Liquid 1 Application(s) Topical <User Schedule>  clopidogrel Tablet 75 milliGRAM(s) Oral daily  dextrose 5%. 1000 milliLiter(s) (50 mL/Hr) IV Continuous <Continuous>  dextrose 50% Injectable 12.5 Gram(s) IV Push once  dextrose 50% Injectable 25 Gram(s) IV Push once  famotidine   Suspension 20 milliGRAM(s) Oral every 48 hours  fluconAZOLE   40 mG/mL Suspension 100 milliGRAM(s) Enteral Tube daily  insulin glargine Injectable (LANTUS) 12 Unit(s) SubCutaneous at bedtime  insulin lispro (HumaLOG) corrective regimen sliding scale   SubCutaneous four times a day before meals  insulin lispro (HumaLOG) corrective regimen sliding scale   SubCutaneous at bedtime  lacosamide Solution 50 milliGRAM(s) Oral two times a day  lactobacillus acidophilus 1 Tablet(s) Oral daily  levETIRAcetam  Solution 250 milliGRAM(s) Oral two times a day  levETIRAcetam  Solution 125 milliGRAM(s) Oral daily  levothyroxine 50 MICROGram(s) Oral daily  pantoprazole  Injectable 40 milliGRAM(s) IV Push once  simethicone 80 milliGRAM(s) Chew two times a day    MEDICATIONS  (PRN):  acetaminophen   Tablet .. 650 milliGRAM(s) Oral every 6 hours PRN Moderate Pain (4 - 6)  bisacodyl Suppository 10 milliGRAM(s) Rectal daily PRN Constipation  dextrose 40% Gel 15 Gram(s) Oral once PRN Blood Glucose LESS THAN 70 milliGRAM(s)/deciliter  glucagon  Injectable 1 milliGRAM(s) IntraMuscular once PRN Glucose <70 milliGRAM(s)/deciLiter      Allergies    No Known Allergies    Intolerances        Review of Systems:    General:  No wt loss, fevers, chills, night sweats,fatigue,   Eyes:  Good vision, no reported pain  ENT:  No sore throat, pain, runny nose, dysphagia  CV:  No pain, palpitations, hypo/hypertension  Resp:  No dyspnea, cough, tachypnea, wheezing  GI:  No pain, No nausea, No vomiting, No diarrhea, No constipation, No weight loss, No fever, No pruritis, No rectal bleeding, No melena, No dysphagia  :  No pain, bleeding, incontinence, nocturia  Muscle:  No pain, weakness  Neuro:  No weakness, tingling, memory problems  Psych:  No fatigue, insomnia, mood problems, depression  Endocrine:  No polyuria, polydypsia, cold/heat intolerance  Heme:  No petechiae, ecchymosis, easy bruisability  Skin:  No rash, tattoos, scars, edema      Vital Signs Last 24 Hrs  T(C): 36.7 (10 Herve 2020 07:38), Max: 36.8 (09 Jan 2020 12:57)  T(F): 98 (10 Herve 2020 07:38), Max: 98.3 (10 Herve 2020 04:07)  HR: 74 (10 Herve 2020 07:38) (66 - 86)  BP: 173/83 (10 Herve 2020 07:38) (140/72 - 174/84)  BP(mean): --  RR: 18 (10 Herve 2020 07:38) (17 - 18)  SpO2: 95% (10 Herve 2020 07:38) (95% - 100%)    PHYSICAL EXAM:    GENERAL:  NAD   HEENT:  NC/AT  ABDOMEN:  Soft, non-tender, non- distended, normoactive bowel sounds, +PEG c/d/i  EXTEREMITIES :  no cyanosis, clubbing or edema  SKIN:  No rash/erythema/ecchymoses/petechiae/wounds/abscess/warm/dry  NEURO:  More alert, interactive     LABS:                        9.8    8.99  )-----------( 355      ( 09 Jan 2020 09:36 )             32.4     01-10    135  |  93<L>  |  28<H>  ----------------------------<  189<H>  3.9   |  26  |  4.72<H>    Ca    8.9      10 Herve 2020 06:01            RADIOLOGY & ADDITIONAL TESTS:

## 2020-01-13 NOTE — PROGRESS NOTE ADULT - PROBLEM SELECTOR PLAN 1
Based on work up thus far, likely secondary to complicated UTI  Clinically improving  UCx: Acinetobacter (Pansensitive)  BCx: NGTD, RVP Negative, CT A/P unrevealing for infectious source  - Completed Meropenem x 7 total days  - ID following

## 2020-01-13 NOTE — PROGRESS NOTE ADULT - SUBJECTIVE AND OBJECTIVE BOX
[facility]   MD NINA MASON MD RUORU WONG, PA    TEL:  OFFICE: [phone]  DR HSU CELL: [phone]  RAS MORELOS CELL: [phone]  DR. MARQUEZ CELL: [phone]  DR. ALEX CELL: [phone]    FROM 5 PM - 7 AM PLEASE CALL ANSWERING SERVICE: [phone]    RENAL FOLLOW UP NOTE  --------------------------------------------------------------------------------  HPI:      Pt seen and examined at bedside.   Denies SOB, chest pain     PAST HISTORY  --------------------------------------------------------------------------------  No significant changes to PMH, PSH, FHx, SHx, unless otherwise noted    ALLERGIES & MEDICATIONS  --------------------------------------------------------------------------------  Allergies    No Known Allergies    Intolerances      Standing Inpatient Medications  apixaban 5 milliGRAM(s) Oral two times a day  atorvastatin 80 milliGRAM(s) Oral at bedtime  calcium acetate 667 milliGRAM(s) Oral three times a day with meals  carvedilol 6.25 milliGRAM(s) Oral every 12 hours  chlorhexidine 4% Liquid 1 Application(s) Topical <User Schedule>  clopidogrel Tablet 75 milliGRAM(s) Oral daily  dextrose 5%. 1000 milliLiter(s) IV Continuous <Continuous>  dextrose 50% Injectable 12.5 Gram(s) IV Push once  dextrose 50% Injectable 25 Gram(s) IV Push once  famotidine   Suspension 20 milliGRAM(s) Oral every 48 hours  fluconAZOLE   40 mG/mL Suspension 100 milliGRAM(s) Enteral Tube daily  insulin glargine Injectable (LANTUS) 12 Unit(s) SubCutaneous at bedtime  insulin lispro (HumaLOG) corrective regimen sliding scale   SubCutaneous every 6 hours  lacosamide Solution 50 milliGRAM(s) Oral two times a day  lactobacillus acidophilus 1 Tablet(s) Oral daily  levETIRAcetam  Solution 250 milliGRAM(s) Oral two times a day  levETIRAcetam  Solution 125 milliGRAM(s) Oral daily  levothyroxine 50 MICROGram(s) Oral daily  pantoprazole  Injectable 40 milliGRAM(s) IV Push once  simethicone 80 milliGRAM(s) Chew two times a day    PRN Inpatient Medications  acetaminophen   Tablet .. 650 milliGRAM(s) Oral every 6 hours PRN  albuterol/ipratropium for Nebulization 3 milliLiter(s) Nebulizer every 6 hours PRN  bisacodyl Suppository 10 milliGRAM(s) Rectal daily PRN  dextrose 40% Gel 15 Gram(s) Oral once PRN  glucagon  Injectable 1 milliGRAM(s) IntraMuscular once PRN      REVIEW OF SYSTEMS  --------------------------------------------------------------------------------  General: no fever  CVS: no chest pain  RESP: no sob, no cough  ABD: no abdominal pain  : no dysuria,  MSK: no edema     VITALS/PHYSICAL EXAM  --------------------------------------------------------------------------------  T(C): 36.4 (01-13-20 @ 11:50), Max: 37 (01-12-20 @ 23:21)  HR: 76 (01-13-20 @ 11:50) (67 - 82)  BP: 139/68 (01-13-20 @ 11:50) (109/60 - 153/75)  RR: 18 (01-13-20 @ 11:50) (18 - 19)  SpO2: 98% (01-13-20 @ 11:50) (95% - 98%)  Wt(kg): --        01-12-20 @ 07:01  -  01-13-20 @ 07:00  --------------------------------------------------------  IN: 119 mL / OUT: 0 mL / NET: 119 mL    01-13-20 @ 07:01  -  01-13-20 @ 13:46  --------------------------------------------------------  IN: 800 mL / OUT: 1800 mL / NET: -1000 mL      Physical Exam:  	Gen: NAD  	HEENT: MMM  	Pulm: CTA B/L  	CV: S1S2  	Abd: Soft, +BS  	Ext: No LE edema B/L                      Neuro: Awake , alert  	Skin: Warm and Dry   	Vascular access: TUnnelled HD catheter    LABS/STUDIES  --------------------------------------------------------------------------------              8.6    11.78 >-----------<  359      [01-13-20 @ 08:44]              27.0     132  |  88  |  46  ----------------------------<  81      [01-13-20 @ 06:26]  3.8   |  25  |  5.90        Ca     8.9     [01-13-20 @ 06:26]            Creatinine Trend:  SCr 5.90 [01-13 @ 06:26]  SCr 4.47 [01-12 @ 06:32]  SCr 6.17 [01-11 @ 06:04]  SCr 4.72 [01-10 @ 06:01]  SCr 7.14 [01-09 @ 05:19]    Urinalysis - [01-02-20 @ 20:34]      Color Light Yellow / Appearance Slightly Turbid / SG 1.012 / pH 7.0      Gluc 100 mg/dL / Ketone Negative  / Bili Negative / Urobili Negative       Blood Small / Protein 300 mg/dL / Leuk Est Moderate / Nitrite Negative      RBC 1 / WBC 46 / Hyaline 1 / Gran  / Sq Epi  / Non Sq Epi 0 / Bacteria Many      Iron 45, TIBC 145, %sat 31      [12-26-19 @ 23:24]  Ferritin 1369      [12-26-19 @ 23:24]  PTH -- (Ca 6.9)      [06-05-19 @ 08:14]   562  HbA1c 8.4      [12-15-19 @ 11:00]  TSH 6.22      [01-03-20 @ 10:03]  Lipid: chol 134, , HDL 56, LDL 52      [12-15-19 @ 10:56]    HBsAb >1000.0      [12-14-19 @ 23:48]  HBsAg Nonreact      [12-14-19 @ 23:48]  HCV 0.15, Nonreact      [12-14-19 @ 23:48]

## 2020-01-13 NOTE — PROGRESS NOTE ADULT - PROBLEM SELECTOR PLAN 4
S/p CARLA in June 2019  TTE in 12/2019 shows normal LVSF and trace MR.   Significant tropninemia in the setting of septic shock, likely type II; Down-trending   - Continue Plavix in setting of history of CAD and in setting of carotid stenosis.   - Continue Atorvastatin  - cont coreg 6.25mg po daily

## 2020-01-13 NOTE — PROGRESS NOTE ADULT - ASSESSMENT
dysphagia  cva  anemia  fever    s/p peg; tolerating bolus feeds  cont feeds as tolerated  H/H stable; monitor cbc daily  ID eval noted; follow their ongoing recommendations   HD per nephrology  will follow     Advanced care planning was discussed with patient and family.  Advanced care planning forms were reviewed and discussed.  Risks, benefits and alternatives of gastroenterologic procedures were discussed in detail and all questions were answered.    30 minutes spent.

## 2020-01-14 ENCOUNTER — TRANSCRIPTION ENCOUNTER (OUTPATIENT)
Age: 65
End: 2020-01-14

## 2020-01-14 VITALS
SYSTOLIC BLOOD PRESSURE: 135 MMHG | HEART RATE: 69 BPM | TEMPERATURE: 98 F | OXYGEN SATURATION: 97 % | DIASTOLIC BLOOD PRESSURE: 74 MMHG | RESPIRATION RATE: 18 BRPM

## 2020-01-14 LAB
ANION GAP SERPL CALC-SCNC: 14 MMOL/L — SIGNIFICANT CHANGE UP (ref 5–17)
BUN SERPL-MCNC: 29 MG/DL — HIGH (ref 7–23)
CALCIUM SERPL-MCNC: 9 MG/DL — SIGNIFICANT CHANGE UP (ref 8.4–10.5)
CHLORIDE SERPL-SCNC: 96 MMOL/L — SIGNIFICANT CHANGE UP (ref 96–108)
CO2 SERPL-SCNC: 23 MMOL/L — SIGNIFICANT CHANGE UP (ref 22–31)
CREAT SERPL-MCNC: 4.38 MG/DL — HIGH (ref 0.5–1.3)
GLUCOSE SERPL-MCNC: 71 MG/DL — SIGNIFICANT CHANGE UP (ref 70–99)
HCT VFR BLD CALC: 31.2 % — LOW (ref 39–50)
HGB BLD-MCNC: 9.1 G/DL — LOW (ref 13–17)
MAGNESIUM SERPL-MCNC: 2.4 MG/DL — SIGNIFICANT CHANGE UP (ref 1.6–2.6)
MCHC RBC-ENTMCNC: 28.7 PG — SIGNIFICANT CHANGE UP (ref 27–34)
MCHC RBC-ENTMCNC: 29.2 GM/DL — LOW (ref 32–36)
MCV RBC AUTO: 98.4 FL — SIGNIFICANT CHANGE UP (ref 80–100)
PHOSPHATE SERPL-MCNC: 1.6 MG/DL — LOW (ref 2.5–4.5)
PLATELET # BLD AUTO: 334 K/UL — SIGNIFICANT CHANGE UP (ref 150–400)
POTASSIUM SERPL-MCNC: 4 MMOL/L — SIGNIFICANT CHANGE UP (ref 3.5–5.3)
POTASSIUM SERPL-SCNC: 4 MMOL/L — SIGNIFICANT CHANGE UP (ref 3.5–5.3)
RBC # BLD: 3.17 M/UL — LOW (ref 4.2–5.8)
RBC # FLD: 14.8 % — HIGH (ref 10.3–14.5)
SODIUM SERPL-SCNC: 133 MMOL/L — LOW (ref 135–145)
WBC # BLD: 10.25 K/UL — SIGNIFICANT CHANGE UP (ref 3.8–10.5)
WBC # FLD AUTO: 10.25 K/UL — SIGNIFICANT CHANGE UP (ref 3.8–10.5)

## 2020-01-14 PROCEDURE — 82248 BILIRUBIN DIRECT: CPT

## 2020-01-14 PROCEDURE — 95816 EEG AWAKE AND DROWSY: CPT

## 2020-01-14 PROCEDURE — 85610 PROTHROMBIN TIME: CPT

## 2020-01-14 PROCEDURE — 80048 BASIC METABOLIC PNL TOTAL CA: CPT

## 2020-01-14 PROCEDURE — 80053 COMPREHEN METABOLIC PANEL: CPT

## 2020-01-14 PROCEDURE — 71260 CT THORAX DX C+: CPT

## 2020-01-14 PROCEDURE — 93970 EXTREMITY STUDY: CPT

## 2020-01-14 PROCEDURE — 87633 RESP VIRUS 12-25 TARGETS: CPT

## 2020-01-14 PROCEDURE — 82330 ASSAY OF CALCIUM: CPT

## 2020-01-14 PROCEDURE — 87486 CHLMYD PNEUM DNA AMP PROBE: CPT

## 2020-01-14 PROCEDURE — 93880 EXTRACRANIAL BILAT STUDY: CPT

## 2020-01-14 PROCEDURE — 87070 CULTURE OTHR SPECIMN AEROBIC: CPT

## 2020-01-14 PROCEDURE — 86901 BLOOD TYPING SEROLOGIC RH(D): CPT

## 2020-01-14 PROCEDURE — 84145 PROCALCITONIN (PCT): CPT

## 2020-01-14 PROCEDURE — 97535 SELF CARE MNGMENT TRAINING: CPT

## 2020-01-14 PROCEDURE — 80184 ASSAY OF PHENOBARBITAL: CPT

## 2020-01-14 PROCEDURE — 87641 MR-STAPH DNA AMP PROBE: CPT

## 2020-01-14 PROCEDURE — 84439 ASSAY OF FREE THYROXINE: CPT

## 2020-01-14 PROCEDURE — 94002 VENT MGMT INPAT INIT DAY: CPT

## 2020-01-14 PROCEDURE — 87184 SC STD DISK METHOD PER PLATE: CPT

## 2020-01-14 PROCEDURE — 86850 RBC ANTIBODY SCREEN: CPT

## 2020-01-14 PROCEDURE — 94640 AIRWAY INHALATION TREATMENT: CPT

## 2020-01-14 PROCEDURE — 82728 ASSAY OF FERRITIN: CPT

## 2020-01-14 PROCEDURE — 99239 HOSP IP/OBS DSCHRG MGMT >30: CPT

## 2020-01-14 PROCEDURE — 82272 OCCULT BLD FECES 1-3 TESTS: CPT

## 2020-01-14 PROCEDURE — 36600 WITHDRAWAL OF ARTERIAL BLOOD: CPT

## 2020-01-14 PROCEDURE — 85730 THROMBOPLASTIN TIME PARTIAL: CPT

## 2020-01-14 PROCEDURE — 97110 THERAPEUTIC EXERCISES: CPT

## 2020-01-14 PROCEDURE — 93005 ELECTROCARDIOGRAM TRACING: CPT

## 2020-01-14 PROCEDURE — 85027 COMPLETE CBC AUTOMATED: CPT

## 2020-01-14 PROCEDURE — 74018 RADEX ABDOMEN 1 VIEW: CPT

## 2020-01-14 PROCEDURE — 87631 RESP VIRUS 3-5 TARGETS: CPT

## 2020-01-14 PROCEDURE — 36430 TRANSFUSION BLD/BLD COMPNT: CPT

## 2020-01-14 PROCEDURE — 84484 ASSAY OF TROPONIN QUANT: CPT

## 2020-01-14 PROCEDURE — 87040 BLOOD CULTURE FOR BACTERIA: CPT

## 2020-01-14 PROCEDURE — 84132 ASSAY OF SERUM POTASSIUM: CPT

## 2020-01-14 PROCEDURE — 82550 ASSAY OF CK (CPK): CPT

## 2020-01-14 PROCEDURE — 87798 DETECT AGENT NOS DNA AMP: CPT

## 2020-01-14 PROCEDURE — 31720 CLEARANCE OF AIRWAYS: CPT

## 2020-01-14 PROCEDURE — 97162 PT EVAL MOD COMPLEX 30 MIN: CPT

## 2020-01-14 PROCEDURE — 86900 BLOOD TYPING SEROLOGIC ABO: CPT

## 2020-01-14 PROCEDURE — 84443 ASSAY THYROID STIM HORMONE: CPT

## 2020-01-14 PROCEDURE — 83690 ASSAY OF LIPASE: CPT

## 2020-01-14 PROCEDURE — 82533 TOTAL CORTISOL: CPT

## 2020-01-14 PROCEDURE — 95951: CPT

## 2020-01-14 PROCEDURE — 87581 M.PNEUMON DNA AMP PROBE: CPT

## 2020-01-14 PROCEDURE — 82962 GLUCOSE BLOOD TEST: CPT

## 2020-01-14 PROCEDURE — 85045 AUTOMATED RETICULOCYTE COUNT: CPT

## 2020-01-14 PROCEDURE — 84100 ASSAY OF PHOSPHORUS: CPT

## 2020-01-14 PROCEDURE — 97129 THER IVNTJ 1ST 15 MIN: CPT

## 2020-01-14 PROCEDURE — 74177 CT ABD & PELVIS W/CONTRAST: CPT

## 2020-01-14 PROCEDURE — 71045 X-RAY EXAM CHEST 1 VIEW: CPT

## 2020-01-14 PROCEDURE — 99261: CPT

## 2020-01-14 PROCEDURE — 82435 ASSAY OF BLOOD CHLORIDE: CPT

## 2020-01-14 PROCEDURE — 81001 URINALYSIS AUTO W/SCOPE: CPT

## 2020-01-14 PROCEDURE — 83605 ASSAY OF LACTIC ACID: CPT

## 2020-01-14 PROCEDURE — 86923 COMPATIBILITY TEST ELECTRIC: CPT

## 2020-01-14 PROCEDURE — 83735 ASSAY OF MAGNESIUM: CPT

## 2020-01-14 PROCEDURE — C9254: CPT

## 2020-01-14 PROCEDURE — 94003 VENT MGMT INPAT SUBQ DAY: CPT

## 2020-01-14 PROCEDURE — 83540 ASSAY OF IRON: CPT

## 2020-01-14 PROCEDURE — 87186 SC STD MICRODIL/AGAR DIL: CPT

## 2020-01-14 PROCEDURE — 84481 FREE ASSAY (FT-3): CPT

## 2020-01-14 PROCEDURE — 97530 THERAPEUTIC ACTIVITIES: CPT

## 2020-01-14 PROCEDURE — 80202 ASSAY OF VANCOMYCIN: CPT

## 2020-01-14 PROCEDURE — 94664 DEMO&/EVAL PT USE INHALER: CPT

## 2020-01-14 PROCEDURE — P9047: CPT

## 2020-01-14 PROCEDURE — 87640 STAPH A DNA AMP PROBE: CPT

## 2020-01-14 PROCEDURE — 85014 HEMATOCRIT: CPT

## 2020-01-14 PROCEDURE — 82803 BLOOD GASES ANY COMBINATION: CPT

## 2020-01-14 PROCEDURE — L8699: CPT

## 2020-01-14 PROCEDURE — 92610 EVALUATE SWALLOWING FUNCTION: CPT

## 2020-01-14 PROCEDURE — 82553 CREATINE MB FRACTION: CPT

## 2020-01-14 PROCEDURE — 80076 HEPATIC FUNCTION PANEL: CPT

## 2020-01-14 PROCEDURE — 84295 ASSAY OF SERUM SODIUM: CPT

## 2020-01-14 PROCEDURE — 82947 ASSAY GLUCOSE BLOOD QUANT: CPT

## 2020-01-14 PROCEDURE — 97166 OT EVAL MOD COMPLEX 45 MIN: CPT

## 2020-01-14 PROCEDURE — 95711 VEEG 2-12 HR UNMONITORED: CPT

## 2020-01-14 PROCEDURE — 87086 URINE CULTURE/COLONY COUNT: CPT

## 2020-01-14 PROCEDURE — 70450 CT HEAD/BRAIN W/O DYE: CPT

## 2020-01-14 PROCEDURE — 95714 VEEG EA 12-26 HR UNMNTR: CPT

## 2020-01-14 PROCEDURE — 93306 TTE W/DOPPLER COMPLETE: CPT

## 2020-01-14 PROCEDURE — 97760 ORTHOTIC MGMT&TRAING 1ST ENC: CPT

## 2020-01-14 PROCEDURE — 83550 IRON BINDING TEST: CPT

## 2020-01-14 PROCEDURE — 97112 NEUROMUSCULAR REEDUCATION: CPT

## 2020-01-14 PROCEDURE — P9016: CPT

## 2020-01-14 RX ORDER — FAMOTIDINE 10 MG/ML
2.5 INJECTION INTRAVENOUS
Qty: 0 | Refills: 0 | DISCHARGE
Start: 2020-01-14

## 2020-01-14 RX ORDER — INSULIN LISPRO 100/ML
VIAL (ML) SUBCUTANEOUS EVERY 6 HOURS
Refills: 0 | Status: DISCONTINUED | OUTPATIENT
Start: 2020-01-14 | End: 2020-01-14

## 2020-01-14 RX ORDER — CARVEDILOL PHOSPHATE 80 MG/1
12.5 CAPSULE, EXTENDED RELEASE ORAL EVERY 12 HOURS
Refills: 0 | Status: DISCONTINUED | OUTPATIENT
Start: 2020-01-14 | End: 2020-01-14

## 2020-01-14 RX ORDER — ACETAMINOPHEN 500 MG
2 TABLET ORAL
Qty: 0 | Refills: 0 | DISCHARGE
Start: 2020-01-14

## 2020-01-14 RX ORDER — LACTOBACILLUS ACIDOPHILUS 100MM CELL
1 CAPSULE ORAL
Qty: 0 | Refills: 0 | DISCHARGE
Start: 2020-01-14

## 2020-01-14 RX ORDER — FLUCONAZOLE 150 MG/1
2.5 TABLET ORAL
Qty: 0 | Refills: 0 | DISCHARGE
Start: 2020-01-14

## 2020-01-14 RX ADMIN — FLUCONAZOLE 100 MILLIGRAM(S): 150 TABLET ORAL at 11:14

## 2020-01-14 RX ADMIN — Medication 50 MICROGRAM(S): at 05:04

## 2020-01-14 RX ADMIN — APIXABAN 5 MILLIGRAM(S): 2.5 TABLET, FILM COATED ORAL at 05:04

## 2020-01-14 RX ADMIN — CLOPIDOGREL BISULFATE 75 MILLIGRAM(S): 75 TABLET, FILM COATED ORAL at 11:14

## 2020-01-14 RX ADMIN — LACOSAMIDE 50 MILLIGRAM(S): 50 TABLET ORAL at 05:03

## 2020-01-14 RX ADMIN — SIMETHICONE 80 MILLIGRAM(S): 80 TABLET, CHEWABLE ORAL at 05:04

## 2020-01-14 RX ADMIN — Medication 667 MILLIGRAM(S): at 05:04

## 2020-01-14 RX ADMIN — Medication 3 MILLILITER(S): at 05:03

## 2020-01-14 RX ADMIN — Medication 1 TABLET(S): at 11:14

## 2020-01-14 RX ADMIN — CARVEDILOL PHOSPHATE 6.25 MILLIGRAM(S): 80 CAPSULE, EXTENDED RELEASE ORAL at 05:04

## 2020-01-14 RX ADMIN — LEVETIRACETAM 250 MILLIGRAM(S): 250 TABLET, FILM COATED ORAL at 05:03

## 2020-01-14 RX ADMIN — Medication 2: at 12:54

## 2020-01-14 NOTE — DISCHARGE NOTE NURSING/CASE MANAGEMENT/SOCIAL WORK - NSDCPEPTSTRK_GEN_ALL_CORE
Stroke warning signs and symptoms/Prescribed medications/Risk factors for stroke/Stroke support groups for patients, families, and friends/Call 911 for stroke/Need for follow up after discharge/Stroke education booklet/Signs and symptoms of stroke

## 2020-01-14 NOTE — PROGRESS NOTE ADULT - PROBLEM SELECTOR PLAN 2
with NGT  -S/p PEG placement 12/27
Carotid dopplers showed: R ICA 70-99% stenosis; L ICA 50-69% stenosis  - Continue lipitor 80 mg qd  - Continue Plavix  - s/p peg placement  - PT/OT
Carotid dopplers showed: R ICA 70-99% stenosis; L ICA 50-69% stenosis  - Continue lipitor 80 mg qd  - Continue Plavix, eliquis  - s/p peg placement  - PT/OT
Elevated since arrival now resolved  blood cultures negative to date. UA negative for UTI. CT CH/A/P with no evidence of infection.   monitor off abx
Elevated since arrival now resolved  blood cultures negative to date. UA negative for UTI. CT CH/A/P with no evidence of infection.   monitor off abx
Found to be in status epilepticus at OSH-  on vimpat, Keppra (redose post HD). Carotid dopplers showed: R ICA 70-99% stenosis; L ICA 50-69% stenosis-  - cont lipitor 80 mg qd  - s/p peg   - PT/OT.
Found to be in status epilepticus at OSH-  on vimpat, Keppra (redose post HD). Carotid dopplers showed: R ICA 70-99% stenosis; L ICA 50-69% stenosis-  - cont lipitor 80 mg qd  - s/p peg   - PT/OT.
Found to be in status epilepticus at OSH-  on vimpat, Keppra (redose post HD). Carotid dopplers showed: R ICA 70-99% stenosis; L ICA 50-69% stenosis-  - cont lipitor 80 mg qd  - s/p peg placement  - PT/OT.
Improved  -CT chest 12/24 negative for PNA  -Sputum culture: MSSA, Enterobacter could be colonizing   -RVP negative  -Duoneb PRN
Improved  -d/c nasal trumpet   -CT chest 12/24 negative for PNA  -Sputum culture: MSSA, Enterobacter could be colonizing   -RVP negative  -Duoneb PRN
New pulm vasc congestion CXR 1/4  -Likely 2nd to volume resuscitation   -HD/UF as tolerated  -Consider stopping Midodrine, patient now hypertensive
New pulm vasc congestion CXR 1/4  -Likely 2nd to volume resuscitation   -HD/UF as tolerated  -Repeat CXR in AM  -Keep sp02>92% on supplemental oxygen as needed
New pulm vasc congestion CXR 1/4, persistent on 1/8  -Likely 2nd to volume resuscitation   -HD/UF as tolerated
New pulm vasc congestion CXR 1/4, persistent on 1/8  -Likely 2nd to volume resuscitation   -HD/UF as tolerated
New pulm vasc congestion CXR 1/4, persistent on 1/8  -Likely 2nd to volume resuscitation   -Normoxic, no c/o dyspnea  -HD/UF as tolerated
New pulm vasc congestion CXR today   -Likely 2nd to volume resuscitation   -HD/UF as tolerated  -Normoxic, no c/o dyspnea  -Will repeat CXR in few days
with NGT  -S/p PEG placement 12/27
with NGT  -S/p PEG placement 12/27

## 2020-01-14 NOTE — DISCHARGE NOTE NURSING/CASE MANAGEMENT/SOCIAL WORK - PATIENT PORTAL LINK FT
You can access the FollowMyHealth Patient Portal offered by [facility] by registering at the following website: [email or web address]. By joining [facility]’s FollowMyHealth portal, you will also be able to view your health information using other applications (apps) compatible with our system.

## 2020-01-14 NOTE — PROGRESS NOTE ADULT - PROBLEM SELECTOR PLAN 4
S/p CARLA in June 2019  TTE in 12/2019 shows normal LVSF and trace MR.   Significant tropninemia in the setting of septic shock, likely type II; Down-trending   - Continue Plavix in setting of history of CAD and in setting of carotid stenosis.   - Continue Atorvastatin  - cont coreg 6.25mg po daily, uptitrate as needed

## 2020-01-14 NOTE — PROGRESS NOTE ADULT - PROVIDER SPECIALTY LIST ADULT
Cardiology
Gastroenterology
Hospitalist
Infectious Disease
Internal Medicine
Internal Medicine
MICU
NSICU
Nephrology
Neurology
Pulmonology
Cardiology
Pulmonology

## 2020-01-14 NOTE — PROGRESS NOTE ADULT - NSHPATTENDINGPLANDISCUSS_GEN_ALL_CORE
ICU staff
ICU staff
Family at bedside
HD UNIT, Team
HD Unit
Medical attending, Family at bedside
Team
WILBER RN
WILBER RN, Family
family
primary
primary, 
MICU resident
ICU
ICU staff
neurology
Medicine ACP

## 2020-01-14 NOTE — PROGRESS NOTE ADULT - MINUTES
25
30
35
30
30
40
45
30
40
45

## 2020-01-14 NOTE — PROGRESS NOTE ADULT - PROBLEM SELECTOR PLAN 10
Transitions of Care Status:  1.  Name of PCP: Jamie Corral  2.  PCP Contacted on Admission: [x] Y    [ ] N ***Contacted 1/6/2020  , 1/8  3.  PCP contacted at Discharge: [ ] Y    [ ] N    [ ] N/A  4.  Post-Discharge Appointment Date and Location:  5.  Summary of Handoff given to PCP: Emailed Dr. Ayala re: hospital course and dispo plans    dispo: likely rehab today    total discharge time: 36 minutes.

## 2020-01-14 NOTE — PROGRESS NOTE ADULT - PROBLEM SELECTOR PROBLEM 2
Dysphagia
Cerebrovascular accident (CVA) due to embolism of cerebral artery
Cough
Cough
Dysphagia
Dysphagia
Leukocytosis, unspecified type
Leukocytosis, unspecified type
Pulmonary edema

## 2020-01-14 NOTE — PROGRESS NOTE ADULT - PROBLEM SELECTOR PROBLEM 7
Anemia, unspecified type
Atrial fibrillation, unspecified type
Atrial fibrillation, unspecified type
Troponin level elevated

## 2020-01-14 NOTE — PROGRESS NOTE ADULT - SUBJECTIVE AND OBJECTIVE BOX
[facility] [place] [facility]   MD NINA MASON MD RUORU WONG, PA    TEL:  OFFICE: [phone]  DR HSU CELL: [phone]  RAS MORELOS CELL: [phone]  DR. MARQUEZ CELL: [phone]  DR. ALEX CELL: [phone]    FROM 5 PM - 7 AM PLEASE CALL ANSWERING SERVICE: [phone]    RENAL FOLLOW UP NOTE  --------------------------------------------------------------------------------  HPI:      Pt seen and examined at bedside.   Denies SOB, chest pain     PAST HISTORY  --------------------------------------------------------------------------------  No significant changes to PMH, PSH, FHx, SHx, unless otherwise noted    ALLERGIES & MEDICATIONS  --------------------------------------------------------------------------------  Allergies    No Known Allergies    Intolerances      Standing Inpatient Medications  apixaban 5 milliGRAM(s) Oral two times a day  atorvastatin 80 milliGRAM(s) Oral at bedtime  calcium acetate 667 milliGRAM(s) Oral three times a day with meals  carvedilol 6.25 milliGRAM(s) Oral every 12 hours  chlorhexidine 4% Liquid 1 Application(s) Topical <User Schedule>  clopidogrel Tablet 75 milliGRAM(s) Oral daily  dextrose 5%. 1000 milliLiter(s) IV Continuous <Continuous>  dextrose 50% Injectable 12.5 Gram(s) IV Push once  dextrose 50% Injectable 25 Gram(s) IV Push once  famotidine   Suspension 20 milliGRAM(s) Oral every 48 hours  fluconAZOLE   40 mG/mL Suspension 100 milliGRAM(s) Enteral Tube daily  insulin glargine Injectable (LANTUS) 12 Unit(s) SubCutaneous at bedtime  insulin lispro (HumaLOG) corrective regimen sliding scale   SubCutaneous every 6 hours  lacosamide Solution 50 milliGRAM(s) Oral two times a day  lactobacillus acidophilus 1 Tablet(s) Oral daily  levETIRAcetam  Solution 250 milliGRAM(s) Oral two times a day  levETIRAcetam  Solution 125 milliGRAM(s) Oral daily  levothyroxine 50 MICROGram(s) Oral daily  simethicone 80 milliGRAM(s) Chew two times a day    PRN Inpatient Medications  acetaminophen   Tablet .. 650 milliGRAM(s) Oral every 6 hours PRN  albuterol/ipratropium for Nebulization 3 milliLiter(s) Nebulizer every 6 hours PRN  bisacodyl Suppository 10 milliGRAM(s) Rectal daily PRN  dextrose 40% Gel 15 Gram(s) Oral once PRN  glucagon  Injectable 1 milliGRAM(s) IntraMuscular once PRN      REVIEW OF SYSTEMS  --------------------------------------------------------------------------------  General: no fever  CVS: no chest pain  RESP: no sob, no cough  ABD: no abdominal pain  : no dysuria,  MSK: no edema     VITALS/PHYSICAL EXAM  --------------------------------------------------------------------------------  T(C): 36.4 (01-14-20 @ 08:00), Max: 36.9 (01-13-20 @ 15:30)  HR: 81 (01-14-20 @ 08:00) (76 - 86)  BP: 145/54 (01-14-20 @ 08:00) (139/68 - 160/83)  RR: 19 (01-14-20 @ 08:00) (18 - 22)  SpO2: 98% (01-14-20 @ 08:00) (97% - 98%)  Wt(kg): --        01-13-20 @ 07:01  -  01-14-20 @ 07:00  --------------------------------------------------------  IN: 1375 mL / OUT: 1800 mL / NET: -425 mL      Physical Exam:  	Gen: NAD  	HEENT: MMM  	Pulm: CTA B/L  	CV: S1S2  	Abd: Soft, +BS  	Ext: No LE edema B/L                      Neuro: Awake non focal  	Skin: Warm and Dry   	Vascular access: Tunnelled HD catheter     LABS/STUDIES  --------------------------------------------------------------------------------              8.6    11.78 >-----------<  359      [01-13-20 @ 08:44]              27.0     133  |  96  |  29  ----------------------------<  71      [01-14-20 @ 06:39]  4.0   |  23  |  4.38        Ca     9.0     [01-14-20 @ 06:39]      Mg     2.4     [01-14-20 @ 06:39]      Phos  1.6     [01-14-20 @ 06:39]            Creatinine Trend:  SCr 4.38 [01-14 @ 06:39]  SCr 5.90 [01-13 @ 06:26]  SCr 4.47 [01-12 @ 06:32]  SCr 6.17 [01-11 @ 06:04]  SCr 4.72 [01-10 @ 06:01]    Urinalysis - [01-02-20 @ 20:34]      Color Light Yellow / Appearance Slightly Turbid / SG 1.012 / pH 7.0      Gluc 100 mg/dL / Ketone Negative  / Bili Negative / Urobili Negative       Blood Small / Protein 300 mg/dL / Leuk Est Moderate / Nitrite Negative      RBC 1 / WBC 46 / Hyaline 1 / Gran  / Sq Epi  / Non Sq Epi 0 / Bacteria Many      Iron 45, TIBC 145, %sat 31      [12-26-19 @ 23:24]  Ferritin 1369      [12-26-19 @ 23:24]  PTH -- (Ca 6.9)      [06-05-19 @ 08:14]   562  HbA1c 8.4      [12-15-19 @ 11:00]  TSH 6.22      [01-03-20 @ 10:03]  Lipid: chol 134, , HDL 56, LDL 52      [12-15-19 @ 10:56]

## 2020-01-14 NOTE — PROGRESS NOTE ADULT - SUBJECTIVE AND OBJECTIVE BOX
Shen De Dios MD  Division of Hospital Medicine  Pager: [phone]  If no response or off-hours, page [phone]  -------------------------------------    Patient is a 64y old  Male who presents with a chief complaint of Seizures (14 Jan 2020 11:59)      SUBJECTIVE / OVERNIGHT EVENTS: none acute  ADDITIONAL REVIEW OF SYSTEMS: pt feels well, no new complaints, still has ongoing cough, no fevers/chills    MEDICATIONS  (STANDING):  apixaban 5 milliGRAM(s) Oral two times a day  atorvastatin 80 milliGRAM(s) Oral at bedtime  calcium acetate 667 milliGRAM(s) Oral three times a day with meals  carvedilol 12.5 milliGRAM(s) Oral every 12 hours  chlorhexidine 4% Liquid 1 Application(s) Topical <User Schedule>  clopidogrel Tablet 75 milliGRAM(s) Oral daily  dextrose 5%. 1000 milliLiter(s) (50 mL/Hr) IV Continuous <Continuous>  dextrose 50% Injectable 12.5 Gram(s) IV Push once  dextrose 50% Injectable 25 Gram(s) IV Push once  famotidine   Suspension 20 milliGRAM(s) Oral every 48 hours  fluconAZOLE   40 mG/mL Suspension 100 milliGRAM(s) Enteral Tube daily  insulin glargine Injectable (LANTUS) 12 Unit(s) SubCutaneous at bedtime  insulin lispro (HumaLOG) corrective regimen sliding scale   SubCutaneous every 6 hours  lacosamide Solution 50 milliGRAM(s) Oral two times a day  lactobacillus acidophilus 1 Tablet(s) Oral daily  levETIRAcetam  Solution 250 milliGRAM(s) Oral two times a day  levETIRAcetam  Solution 125 milliGRAM(s) Oral daily  levothyroxine 50 MICROGram(s) Oral daily  simethicone 80 milliGRAM(s) Chew two times a day    MEDICATIONS  (PRN):  acetaminophen   Tablet .. 650 milliGRAM(s) Oral every 6 hours PRN Moderate Pain (4 - 6)  albuterol/ipratropium for Nebulization 3 milliLiter(s) Nebulizer every 6 hours PRN Shortness of Breath and/or Wheezing  bisacodyl Suppository 10 milliGRAM(s) Rectal daily PRN Constipation  dextrose 40% Gel 15 Gram(s) Oral once PRN Blood Glucose LESS THAN 70 milliGRAM(s)/deciliter  glucagon  Injectable 1 milliGRAM(s) IntraMuscular once PRN Glucose <70 milliGRAM(s)/deciLiter      CAPILLARY BLOOD GLUCOSE      POCT Blood Glucose.: 190 mg/dL (14 Jan 2020 12:12)  POCT Blood Glucose.: 76 mg/dL (14 Jan 2020 05:19)  POCT Blood Glucose.: 214 mg/dL (13 Jan 2020 23:26)  POCT Blood Glucose.: 156 mg/dL (13 Jan 2020 21:32)  POCT Blood Glucose.: 198 mg/dL (13 Jan 2020 19:01)  POCT Blood Glucose.: 232 mg/dL (13 Jan 2020 17:47)    I&O's Summary    13 Jan 2020 07:01  -  14 Jan 2020 07:00  --------------------------------------------------------  IN: 1375 mL / OUT: 1800 mL / NET: -425 mL        PHYSICAL EXAM:  Vital Signs Last 24 Hrs  T(C): 36.8 (14 Jan 2020 11:36), Max: 36.8 (14 Jan 2020 04:50)  T(F): 98.2 (14 Jan 2020 11:36), Max: 98.2 (14 Jan 2020 04:50)  HR: 69 (14 Jan 2020 11:36) (69 - 86)  BP: 135/74 (14 Jan 2020 11:36) (135/74 - 160/83)  BP(mean): --  RR: 18 (14 Jan 2020 11:36) (18 - 22)  SpO2: 97% (14 Jan 2020 11:36) (97% - 98%)  CONSTITUTIONAL: NAD, well-developed, well-groomed  EYES: PERRLA; conjunctiva and sclera clear  ENMT: Moist oral mucosa, no pharyngeal injection or exudates; normal dentition  NECK: Supple, no palpable masses; no thyromegaly  RESPIRATORY: Normal respiratory effort; lungs are clear to auscultation bilaterally  CARDIOVASCULAR: Regular rate and rhythm, normal S1 and S2, no murmur/rub/gallop; No lower extremity edema; Peripheral pulses are 2+ bilaterally  ABDOMEN: Nontender to palpation, normoactive bowel sounds, no rebound/guarding; No hepatosplenomegaly  MUSCLOSKELETAL:  Normal gait; no clubbing or cyanosis of digits; no joint swelling or tenderness to palpation  PSYCH: A+O to person, place, and time; affect appropriate  NEUROLOGY: CN 2-12 are intact and symmetric; no gross sensory deficits;   SKIN: No rashes; no palpable lesions    LABS:                        9.1    10.25 )-----------( 334      ( 14 Jan 2020 09:17 )             31.2     01-14    133<L>  |  96  |  29<H>  ----------------------------<  71  4.0   |  23  |  4.38<H>    Ca    9.0      14 Jan 2020 06:39  Phos  1.6     01-14  Mg     2.4     01-14

## 2020-01-14 NOTE — PROGRESS NOTE ADULT - ASSESSMENT
64 M hx of CAD s/p 2009, 2019, HTN, DM2, carotid stenosis, ESRD on HD admitted as transfer on mechanical ventilation for status epilepticus in setting of new embolic CVA At [facility], he received tPA on 12/13/19, was noted to have SAH after tPA and hyponatremia, s/p hypertonic saline, course complicated by status epilepticus and intubated for airway protection, subsequently transferred to [facility] for further management, s/p extubation, course c/b dysphagia and failed S&S eval s/p PEG tube placement, and septic shock attributed to complicated UTI and refractory cough likely from ongoing aspiration, now clinically stable awaiting xfer to rehab

## 2020-01-14 NOTE — PROGRESS NOTE ADULT - PROBLEM SELECTOR PLAN 6
/p CARLA in June 2019 c/b ST Depressions. Cards following. TTE in 12/2019 shows normal LVSF and trace MR.   - Recommend to continue to Dual antiplatelet with ASA/Plavix  - coreg 3.125 bid.
Receiving HD via perma-cath HD as per renal. Was planning for renal transplant.  - cont phoslo   - Trend BMP daily  - Renally dose all medications  - Avoid nephrotoxins  - Appreciate nephrology recommendations
Receiving HD via perma-cath HD as per renal. Was planning for renal transplant.  - cont phoslo   - monitor bmp qd.
S/p CARLA in June 2019 c/b ST Depressions. Cards following. TTE in 12/2019 shows normal LVSF and trace MR.   - Recommend to continue to Dual antiplatelet with ASA/Plavix  - coreg 3.125 bid.
Worsened encephalopathy in the setting of sepsis   -CT head concerning for worsening stroke so patient transferred to [facility] but per neurology-just enhanced uptake in areas of old stroke. No new infarcts  -EEG negative  -Now improving
Worsened encephalopathy in the setting of sepsis   -Now improved   -CT head concerning for worsening stroke so patient transferred to [facility]SC[facility] but per neurology-just enhanced uptake in areas of old stroke. No new infarcts  -EEG negative
Worsened encephalopathy in the setting of sepsis 1/3  -CT head concerning for worsening stroke so patient transferred to NSC[facility] but per neurology-just enhanced uptake in areas of old stroke. No new infarcts  -EEG negative

## 2020-01-14 NOTE — PROGRESS NOTE ADULT - PROBLEM SELECTOR PLAN 7
Currently NSR, monitored on tele, off A/c.  - CHADVASC  score 4, neuro rec off a/c for 2 weeks.
Currently NSR, monitored on tele, off A/c.  - CHADVASC  score 4, neuro rec off a/c for 2 weeks.
Normocytic anemia likely 2/2 anemia of chronic disease in setting of ESRD. Baseline here hg likely 8-9. Nephrology following for HD.   - monitor cbc qd
Normocytic anemia likely 2/2 anemia of chronic disease in setting of ESRD. Baseline here hg likely 8-9. Nephrology following for HD.   - monitor cbc qd.
Consider cards re-consult   -Troponin I level [facility] by [facility]

## 2020-01-14 NOTE — PROGRESS NOTE ADULT - PROBLEM SELECTOR PROBLEM 6
Coronary artery disease, angina presence unspecified, unspecified vessel or lesion type, unspecified whether native or transplanted heart
Coronary artery disease, angina presence unspecified, unspecified vessel or lesion type, unspecified whether native or transplanted heart
ESRD (end stage renal disease)
Encephalopathy

## 2020-01-14 NOTE — PROGRESS NOTE ADULT - PROBLEM SELECTOR PROBLEM 1
Cough
Anemia, unspecified type
Anemia, unspecified type
Cough
Leukocytosis
Leukocytosis
Sepsis without acute organ dysfunction, due to unspecified organism
Septic shock due to undetermined organism

## 2020-01-14 NOTE — PROGRESS NOTE ADULT - ASSESSMENT
65 yo male with ESRD on HD (M-W-FRI without AVF as awaiting renal transplant expected within 3 months; Dr. Barrientos; [place] [facility]), CAD s/p stent 2009 and CARLA in June 2019, HTN, DM type 2, hypothyroidism and left carotid stenosis admitted for altered mental status during dialysis, he became unresponsive and arm not moving therefore he was sent to hospital. At [facility], he received tPA on 12/13/19. Was noted to have SAH after tPA. Started on 3% saline. Had seizure, despite multiple AEDs remained in status epilepticus and was transferred to [facility] for continuous EEG monitoring. s/p extubation, PEG tube placement. Is awake and following commands. Nephrology is following for ESRD management.    ESRD on HD MWF via tunnelled HD catheter   - HD consent in the chart  - Last HD 1/13 with 1 L UF   -Plan for HD tomorrow  - please dose Keppra post HD on HD days      - needs rehab w/ onsite HD    Hyponatremia  - from increased free water intake  - Use minimum possible free water flushes  - should improve w/ HD    Anemia  - unable to use epogen due to CVA  - transfuse PRN Hgb <7 or hemodynamically significant bleeding  - no evidence of occult bleeding  - GI following     HTN  -BP stable  - Monitor closely    Fever   -resolved  -s/p CT ABd   - PT also with permacath -

## 2020-01-14 NOTE — PROGRESS NOTE ADULT - SUBJECTIVE AND OBJECTIVE BOX
INTERVAL HPI/OVERNIGHT EVENTS:    patient seen and examined at bedside with family present   he denies abdominal pain, n/v  tolerating bolus feeds    MEDICATIONS  (STANDING):  apixaban 5 milliGRAM(s) Oral two times a day  atorvastatin 80 milliGRAM(s) Oral at bedtime  calcium acetate 667 milliGRAM(s) Oral three times a day with meals  carvedilol 12.5 milliGRAM(s) Oral every 12 hours  chlorhexidine 4% Liquid 1 Application(s) Topical <User Schedule>  clopidogrel Tablet 75 milliGRAM(s) Oral daily  dextrose 5%. 1000 milliLiter(s) (50 mL/Hr) IV Continuous <Continuous>  dextrose 50% Injectable 12.5 Gram(s) IV Push once  dextrose 50% Injectable 25 Gram(s) IV Push once  famotidine   Suspension 20 milliGRAM(s) Oral every 48 hours  fluconAZOLE   40 mG/mL Suspension 100 milliGRAM(s) Enteral Tube daily  insulin glargine Injectable (LANTUS) 12 Unit(s) SubCutaneous at bedtime  insulin lispro (HumaLOG) corrective regimen sliding scale   SubCutaneous every 6 hours  lacosamide Solution 50 milliGRAM(s) Oral two times a day  lactobacillus acidophilus 1 Tablet(s) Oral daily  levETIRAcetam  Solution 250 milliGRAM(s) Oral two times a day  levETIRAcetam  Solution 125 milliGRAM(s) Oral daily  levothyroxine 50 MICROGram(s) Oral daily  simethicone 80 milliGRAM(s) Chew two times a day    MEDICATIONS  (PRN):  acetaminophen   Tablet .. 650 milliGRAM(s) Oral every 6 hours PRN Moderate Pain (4 - 6)  albuterol/ipratropium for Nebulization 3 milliLiter(s) Nebulizer every 6 hours PRN Shortness of Breath and/or Wheezing  bisacodyl Suppository 10 milliGRAM(s) Rectal daily PRN Constipation  dextrose 40% Gel 15 Gram(s) Oral once PRN Blood Glucose LESS THAN 70 milliGRAM(s)/deciliter  glucagon  Injectable 1 milliGRAM(s) IntraMuscular once PRN Glucose <70 milliGRAM(s)/deciLiter      Allergies    No Known Allergies    Intolerances        Review of Systems:    General:  No wt loss, fevers, chills, night sweats,fatigue,   Eyes:  Good vision, no reported pain  ENT:  No sore throat, pain, runny nose, dysphagia  CV:  No pain, palpitations, hypo/hypertension  Resp:  No dyspnea, cough, tachypnea, wheezing  GI:  No pain, No nausea, No vomiting, No diarrhea, No constipation, No weight loss, No fever, No pruritis, No rectal bleeding, No melena, No dysphagia  :  No pain, bleeding, incontinence, nocturia  Muscle:  No pain, weakness  Neuro:  No weakness, tingling, memory problems  Psych:  No fatigue, insomnia, mood problems, depression  Endocrine:  No polyuria, polydypsia, cold/heat intolerance  Heme:  No petechiae, ecchymosis, easy bruisability  Skin:  No rash, tattoos, scars, edema      Vital Signs Last 24 Hrs  T(C): 36.4 (14 Jan 2020 08:00), Max: 36.9 (13 Jan 2020 15:30)  T(F): 97.5 (14 Jan 2020 08:00), Max: 98.5 (13 Jan 2020 15:30)  HR: 81 (14 Jan 2020 08:00) (76 - 86)  BP: 145/54 (14 Jan 2020 08:00) (139/68 - 160/83)  BP(mean): --  RR: 19 (14 Jan 2020 08:00) (18 - 22)  SpO2: 98% (14 Jan 2020 08:00) (97% - 98%)    PHYSICAL EXAM:    GENERAL:  NAD   HEENT:  NC/AT  ABDOMEN:  Soft, non-tender, non- distended, normoactive bowel sounds, +PEG c/d/i  EXTREMITIES :  no cyanosis, clubbing or edema  SKIN:  No rash/erythema/ecchymoses/petechiae/wounds/abscess/warm/dry  NEURO:  More alert, interactive     LABS:                        9.1    10.25 )-----------( 334      ( 14 Jan 2020 09:17 )             31.2     01-14    133<L>  |  96  |  29<H>  ----------------------------<  71  4.0   |  23  |  4.38<H>    Ca    9.0      14 Jan 2020 06:39  Phos  1.6     01-14  Mg     2.4     01-14            RADIOLOGY & ADDITIONAL TESTS:

## 2020-01-14 NOTE — PROGRESS NOTE ADULT - SUBJECTIVE AND OBJECTIVE BOX
Follow-up Pulm Progress Note    No new respiratory events overnight.  Denies SOB/CP.   Sats 96% RA    Medications:  MEDICATIONS  (STANDING):  apixaban 5 milliGRAM(s) Oral two times a day  atorvastatin 80 milliGRAM(s) Oral at bedtime  calcium acetate 667 milliGRAM(s) Oral three times a day with meals  carvedilol 12.5 milliGRAM(s) Oral every 12 hours  chlorhexidine 4% Liquid 1 Application(s) Topical <User Schedule>  clopidogrel Tablet 75 milliGRAM(s) Oral daily  dextrose 5%. 1000 milliLiter(s) (50 mL/Hr) IV Continuous <Continuous>  dextrose 50% Injectable 12.5 Gram(s) IV Push once  dextrose 50% Injectable 25 Gram(s) IV Push once  famotidine   Suspension 20 milliGRAM(s) Oral every 48 hours  fluconAZOLE   40 mG/mL Suspension 100 milliGRAM(s) Enteral Tube daily  insulin glargine Injectable (LANTUS) 12 Unit(s) SubCutaneous at bedtime  insulin lispro (HumaLOG) corrective regimen sliding scale   SubCutaneous every 6 hours  lacosamide Solution 50 milliGRAM(s) Oral two times a day  lactobacillus acidophilus 1 Tablet(s) Oral daily  levETIRAcetam  Solution 250 milliGRAM(s) Oral two times a day  levETIRAcetam  Solution 125 milliGRAM(s) Oral daily  levothyroxine 50 MICROGram(s) Oral daily  simethicone 80 milliGRAM(s) Chew two times a day    MEDICATIONS  (PRN):  acetaminophen   Tablet .. 650 milliGRAM(s) Oral every 6 hours PRN Moderate Pain (4 - 6)  albuterol/ipratropium for Nebulization 3 milliLiter(s) Nebulizer every 6 hours PRN Shortness of Breath and/or Wheezing  bisacodyl Suppository 10 milliGRAM(s) Rectal daily PRN Constipation  dextrose 40% Gel 15 Gram(s) Oral once PRN Blood Glucose LESS THAN 70 milliGRAM(s)/deciliter  glucagon  Injectable 1 milliGRAM(s) IntraMuscular once PRN Glucose <70 milliGRAM(s)/deciLiter          Vital Signs Last 24 Hrs  T(C): 36.8 (14 Jan 2020 11:36), Max: 36.9 (13 Jan 2020 15:30)  T(F): 98.2 (14 Jan 2020 11:36), Max: 98.5 (13 Jan 2020 15:30)  HR: 69 (14 Jan 2020 11:36) (69 - 86)  BP: 135/74 (14 Jan 2020 11:36) (135/74 - 160/83)  BP(mean): --  RR: 18 (14 Jan 2020 11:36) (18 - 22)  SpO2: 97% (14 Jan 2020 11:36) (97% - 98%)          01-13 @ 07:01  -  01-14 @ 07:00  --------------------------------------------------------  IN: 1375 mL / OUT: 1800 mL / NET: -425 mL          LABS:                        9.1    10.25 )-----------( 334      ( 14 Jan 2020 09:17 )             31.2     01-14    133<L>  |  96  |  29<H>  ----------------------------<  71  4.0   |  23  |  4.38<H>    Ca    9.0      14 Jan 2020 06:39  Phos  1.6     01-14  Mg     2.4     01-14            CAPILLARY BLOOD GLUCOSE      POCT Blood Glucose.: 76 mg/dL (14 Jan 2020 05:19)        Physical Examination:  PULM: Clear to auscultation bilaterally, no significant sputum production  CVS: RRR    RADIOLOGY REVIEWED  CXR: 1/8: Mild pulm edema

## 2020-01-14 NOTE — PROGRESS NOTE ADULT - REASON FOR ADMISSION
Seizures
status epilepticus
Seizures

## 2020-02-20 ENCOUNTER — INPATIENT (INPATIENT)
Facility: HOSPITAL | Age: 65
LOS: 6 days | Discharge: NURSING, REHAB OR LONG-TERM CARE | DRG: 377 | End: 2020-02-27
Attending: INTERNAL MEDICINE | Admitting: STUDENT IN AN ORGANIZED HEALTH CARE EDUCATION/TRAINING PROGRAM
Payer: MEDICARE

## 2020-02-20 VITALS
TEMPERATURE: 99 F | SYSTOLIC BLOOD PRESSURE: 171 MMHG | RESPIRATION RATE: 16 BRPM | DIASTOLIC BLOOD PRESSURE: 82 MMHG | HEART RATE: 71 BPM | OXYGEN SATURATION: 100 %

## 2020-02-20 DIAGNOSIS — N18.6 END STAGE RENAL DISEASE: ICD-10-CM

## 2020-02-20 DIAGNOSIS — I63.9 CEREBRAL INFARCTION, UNSPECIFIED: ICD-10-CM

## 2020-02-20 DIAGNOSIS — Z95.1 PRESENCE OF AORTOCORONARY BYPASS GRAFT: Chronic | ICD-10-CM

## 2020-02-20 DIAGNOSIS — E11.9 TYPE 2 DIABETES MELLITUS WITHOUT COMPLICATIONS: ICD-10-CM

## 2020-02-20 DIAGNOSIS — I10 ESSENTIAL (PRIMARY) HYPERTENSION: ICD-10-CM

## 2020-02-20 DIAGNOSIS — Z02.9 ENCOUNTER FOR ADMINISTRATIVE EXAMINATIONS, UNSPECIFIED: ICD-10-CM

## 2020-02-20 DIAGNOSIS — I25.10 ATHEROSCLEROTIC HEART DISEASE OF NATIVE CORONARY ARTERY WITHOUT ANGINA PECTORIS: ICD-10-CM

## 2020-02-20 DIAGNOSIS — D62 ACUTE POSTHEMORRHAGIC ANEMIA: ICD-10-CM

## 2020-02-20 DIAGNOSIS — I48.91 UNSPECIFIED ATRIAL FIBRILLATION: ICD-10-CM

## 2020-02-20 DIAGNOSIS — K92.2 GASTROINTESTINAL HEMORRHAGE, UNSPECIFIED: ICD-10-CM

## 2020-02-20 DIAGNOSIS — Z87.898 PERSONAL HISTORY OF OTHER SPECIFIED CONDITIONS: ICD-10-CM

## 2020-02-20 DIAGNOSIS — Z29.9 ENCOUNTER FOR PROPHYLACTIC MEASURES, UNSPECIFIED: ICD-10-CM

## 2020-02-20 LAB
ALBUMIN SERPL ELPH-MCNC: 3.6 G/DL — SIGNIFICANT CHANGE UP (ref 3.3–5)
ALP SERPL-CCNC: 132 U/L — HIGH (ref 40–120)
ALT FLD-CCNC: 35 U/L — SIGNIFICANT CHANGE UP (ref 10–45)
ANION GAP SERPL CALC-SCNC: 14 MMOL/L — SIGNIFICANT CHANGE UP (ref 5–17)
APTT BLD: 34.3 SEC — SIGNIFICANT CHANGE UP (ref 27.5–36.3)
AST SERPL-CCNC: 53 U/L — HIGH (ref 10–40)
BASOPHILS # BLD AUTO: 0.03 K/UL — SIGNIFICANT CHANGE UP (ref 0–0.2)
BASOPHILS NFR BLD AUTO: 0.3 % — SIGNIFICANT CHANGE UP (ref 0–2)
BILIRUB SERPL-MCNC: 0.2 MG/DL — SIGNIFICANT CHANGE UP (ref 0.2–1.2)
BLD GP AB SCN SERPL QL: NEGATIVE — SIGNIFICANT CHANGE UP
BUN SERPL-MCNC: 49 MG/DL — HIGH (ref 7–23)
CALCIUM SERPL-MCNC: 9.2 MG/DL — SIGNIFICANT CHANGE UP (ref 8.4–10.5)
CHLORIDE SERPL-SCNC: 90 MMOL/L — LOW (ref 96–108)
CO2 SERPL-SCNC: 24 MMOL/L — SIGNIFICANT CHANGE UP (ref 22–31)
CREAT SERPL-MCNC: 3.88 MG/DL — HIGH (ref 0.5–1.3)
EOSINOPHIL # BLD AUTO: 0.29 K/UL — SIGNIFICANT CHANGE UP (ref 0–0.5)
EOSINOPHIL NFR BLD AUTO: 2.6 % — SIGNIFICANT CHANGE UP (ref 0–6)
GAS PNL BLDV: SIGNIFICANT CHANGE UP
GLUCOSE SERPL-MCNC: 78 MG/DL — SIGNIFICANT CHANGE UP (ref 70–99)
HCT VFR BLD CALC: 22.8 % — LOW (ref 39–50)
HCT VFR BLD CALC: 26 % — LOW (ref 39–50)
HGB BLD-MCNC: 7.1 G/DL — LOW (ref 13–17)
HGB BLD-MCNC: 8.1 G/DL — LOW (ref 13–17)
IMM GRANULOCYTES NFR BLD AUTO: 0.4 % — SIGNIFICANT CHANGE UP (ref 0–1.5)
INR BLD: 1.5 RATIO — HIGH (ref 0.88–1.16)
LYMPHOCYTES # BLD AUTO: 26.9 % — SIGNIFICANT CHANGE UP (ref 13–44)
LYMPHOCYTES # BLD AUTO: 3.02 K/UL — SIGNIFICANT CHANGE UP (ref 1–3.3)
MCHC RBC-ENTMCNC: 29.8 PG — SIGNIFICANT CHANGE UP (ref 27–34)
MCHC RBC-ENTMCNC: 30.9 PG — SIGNIFICANT CHANGE UP (ref 27–34)
MCHC RBC-ENTMCNC: 31.1 GM/DL — LOW (ref 32–36)
MCHC RBC-ENTMCNC: 31.2 GM/DL — LOW (ref 32–36)
MCV RBC AUTO: 95.6 FL — SIGNIFICANT CHANGE UP (ref 80–100)
MCV RBC AUTO: 99.1 FL — SIGNIFICANT CHANGE UP (ref 80–100)
MONOCYTES # BLD AUTO: 0.85 K/UL — SIGNIFICANT CHANGE UP (ref 0–0.9)
MONOCYTES NFR BLD AUTO: 7.6 % — SIGNIFICANT CHANGE UP (ref 2–14)
NEUTROPHILS # BLD AUTO: 7 K/UL — SIGNIFICANT CHANGE UP (ref 1.8–7.4)
NEUTROPHILS NFR BLD AUTO: 62.2 % — SIGNIFICANT CHANGE UP (ref 43–77)
NRBC # BLD: 0 /100 WBCS — SIGNIFICANT CHANGE UP (ref 0–0)
NRBC # BLD: 0 /100 WBCS — SIGNIFICANT CHANGE UP (ref 0–0)
PLATELET # BLD AUTO: 277 K/UL — SIGNIFICANT CHANGE UP (ref 150–400)
PLATELET # BLD AUTO: 289 K/UL — SIGNIFICANT CHANGE UP (ref 150–400)
POTASSIUM SERPL-MCNC: 4.4 MMOL/L — SIGNIFICANT CHANGE UP (ref 3.5–5.3)
POTASSIUM SERPL-SCNC: 4.4 MMOL/L — SIGNIFICANT CHANGE UP (ref 3.5–5.3)
PROT SERPL-MCNC: 8.3 G/DL — SIGNIFICANT CHANGE UP (ref 6–8.3)
PROTHROM AB SERPL-ACNC: 17.5 SEC — HIGH (ref 10–12.9)
RBC # BLD: 2.3 M/UL — LOW (ref 4.2–5.8)
RBC # BLD: 2.72 M/UL — LOW (ref 4.2–5.8)
RBC # FLD: 17 % — HIGH (ref 10.3–14.5)
RBC # FLD: 18.1 % — HIGH (ref 10.3–14.5)
RH IG SCN BLD-IMP: POSITIVE — SIGNIFICANT CHANGE UP
SODIUM SERPL-SCNC: 128 MMOL/L — LOW (ref 135–145)
WBC # BLD: 10.05 K/UL — SIGNIFICANT CHANGE UP (ref 3.8–10.5)
WBC # BLD: 11.23 K/UL — HIGH (ref 3.8–10.5)
WBC # FLD AUTO: 10.05 K/UL — SIGNIFICANT CHANGE UP (ref 3.8–10.5)
WBC # FLD AUTO: 11.23 K/UL — HIGH (ref 3.8–10.5)

## 2020-02-20 PROCEDURE — 99223 1ST HOSP IP/OBS HIGH 75: CPT | Mod: GC,AI

## 2020-02-20 PROCEDURE — 93010 ELECTROCARDIOGRAM REPORT: CPT

## 2020-02-20 PROCEDURE — 99285 EMERGENCY DEPT VISIT HI MDM: CPT | Mod: GC

## 2020-02-20 PROCEDURE — 76942 ECHO GUIDE FOR BIOPSY: CPT | Mod: 26

## 2020-02-20 RX ORDER — SODIUM CHLORIDE 9 MG/ML
1000 INJECTION, SOLUTION INTRAVENOUS
Refills: 0 | Status: DISCONTINUED | OUTPATIENT
Start: 2020-02-20 | End: 2020-02-23

## 2020-02-20 RX ORDER — INSULIN GLARGINE 100 [IU]/ML
10 INJECTION, SOLUTION SUBCUTANEOUS AT BEDTIME
Refills: 0 | Status: DISCONTINUED | OUTPATIENT
Start: 2020-02-20 | End: 2020-02-26

## 2020-02-20 RX ORDER — GLUCAGON INJECTION, SOLUTION 0.5 MG/.1ML
1 INJECTION, SOLUTION SUBCUTANEOUS ONCE
Refills: 0 | Status: DISCONTINUED | OUTPATIENT
Start: 2020-02-20 | End: 2020-02-23

## 2020-02-20 RX ORDER — SENNA PLUS 8.6 MG/1
1 TABLET ORAL AT BEDTIME
Refills: 0 | Status: DISCONTINUED | OUTPATIENT
Start: 2020-02-20 | End: 2020-02-27

## 2020-02-20 RX ORDER — DEXTROSE 50 % IN WATER 50 %
15 SYRINGE (ML) INTRAVENOUS ONCE
Refills: 0 | Status: DISCONTINUED | OUTPATIENT
Start: 2020-02-20 | End: 2020-02-23

## 2020-02-20 RX ORDER — DEXTROSE 50 % IN WATER 50 %
25 SYRINGE (ML) INTRAVENOUS ONCE
Refills: 0 | Status: DISCONTINUED | OUTPATIENT
Start: 2020-02-20 | End: 2020-02-23

## 2020-02-20 RX ORDER — TRAZODONE HCL 50 MG
50 TABLET ORAL AT BEDTIME
Refills: 0 | Status: DISCONTINUED | OUTPATIENT
Start: 2020-02-20 | End: 2020-02-27

## 2020-02-20 RX ORDER — PANTOPRAZOLE SODIUM 20 MG/1
8 TABLET, DELAYED RELEASE ORAL
Qty: 80 | Refills: 0 | Status: DISCONTINUED | OUTPATIENT
Start: 2020-02-20 | End: 2020-02-24

## 2020-02-20 RX ORDER — DEXTROSE 50 % IN WATER 50 %
12.5 SYRINGE (ML) INTRAVENOUS ONCE
Refills: 0 | Status: DISCONTINUED | OUTPATIENT
Start: 2020-02-20 | End: 2020-02-23

## 2020-02-20 RX ORDER — ATORVASTATIN CALCIUM 80 MG/1
80 TABLET, FILM COATED ORAL AT BEDTIME
Refills: 0 | Status: DISCONTINUED | OUTPATIENT
Start: 2020-02-20 | End: 2020-02-27

## 2020-02-20 RX ORDER — SEVELAMER CARBONATE 2400 MG/1
800 POWDER, FOR SUSPENSION ORAL
Refills: 0 | Status: DISCONTINUED | OUTPATIENT
Start: 2020-02-20 | End: 2020-02-20

## 2020-02-20 RX ORDER — LANOLIN ALCOHOL/MO/W.PET/CERES
3 CREAM (GRAM) TOPICAL AT BEDTIME
Refills: 0 | Status: DISCONTINUED | OUTPATIENT
Start: 2020-02-20 | End: 2020-02-27

## 2020-02-20 RX ORDER — INSULIN LISPRO 100/ML
VIAL (ML) SUBCUTANEOUS EVERY 6 HOURS
Refills: 0 | Status: DISCONTINUED | OUTPATIENT
Start: 2020-02-20 | End: 2020-02-23

## 2020-02-20 RX ORDER — INSULIN LISPRO 100/ML
0 VIAL (ML) SUBCUTANEOUS
Qty: 0 | Refills: 0 | DISCHARGE

## 2020-02-20 RX ORDER — LACOSAMIDE 50 MG/1
50 TABLET ORAL
Refills: 0 | Status: DISCONTINUED | OUTPATIENT
Start: 2020-02-20 | End: 2020-02-27

## 2020-02-20 RX ORDER — ESCITALOPRAM OXALATE 10 MG/1
5 TABLET, FILM COATED ORAL DAILY
Refills: 0 | Status: DISCONTINUED | OUTPATIENT
Start: 2020-02-20 | End: 2020-02-27

## 2020-02-20 RX ORDER — LEVETIRACETAM 250 MG/1
250 TABLET, FILM COATED ORAL
Refills: 0 | Status: DISCONTINUED | OUTPATIENT
Start: 2020-02-20 | End: 2020-02-27

## 2020-02-20 RX ORDER — SEVELAMER CARBONATE 2400 MG/1
800 POWDER, FOR SUSPENSION ORAL
Refills: 0 | Status: DISCONTINUED | OUTPATIENT
Start: 2020-02-20 | End: 2020-02-22

## 2020-02-20 RX ORDER — HEPARIN SODIUM 5000 [USP'U]/ML
INJECTION INTRAVENOUS; SUBCUTANEOUS
Qty: 25000 | Refills: 0 | Status: DISCONTINUED | OUTPATIENT
Start: 2020-02-20 | End: 2020-02-21

## 2020-02-20 RX ORDER — LEVETIRACETAM 250 MG/1
250 TABLET, FILM COATED ORAL
Refills: 0 | Status: DISCONTINUED | OUTPATIENT
Start: 2020-02-20 | End: 2020-02-20

## 2020-02-20 RX ORDER — HEPARIN SODIUM 5000 [USP'U]/ML
3000 INJECTION INTRAVENOUS; SUBCUTANEOUS EVERY 6 HOURS
Refills: 0 | Status: DISCONTINUED | OUTPATIENT
Start: 2020-02-20 | End: 2020-02-21

## 2020-02-20 RX ORDER — CARVEDILOL PHOSPHATE 80 MG/1
3.12 CAPSULE, EXTENDED RELEASE ORAL EVERY 12 HOURS
Refills: 0 | Status: DISCONTINUED | OUTPATIENT
Start: 2020-02-20 | End: 2020-02-22

## 2020-02-20 RX ORDER — POLYETHYLENE GLYCOL 3350 17 G/17G
17 POWDER, FOR SOLUTION ORAL DAILY
Refills: 0 | Status: DISCONTINUED | OUTPATIENT
Start: 2020-02-20 | End: 2020-02-27

## 2020-02-20 RX ORDER — PANTOPRAZOLE SODIUM 20 MG/1
80 TABLET, DELAYED RELEASE ORAL ONCE
Refills: 0 | Status: COMPLETED | OUTPATIENT
Start: 2020-02-20 | End: 2020-02-20

## 2020-02-20 RX ORDER — HEPARIN SODIUM 5000 [USP'U]/ML
6000 INJECTION INTRAVENOUS; SUBCUTANEOUS EVERY 6 HOURS
Refills: 0 | Status: DISCONTINUED | OUTPATIENT
Start: 2020-02-20 | End: 2020-02-21

## 2020-02-20 RX ORDER — LEVOTHYROXINE SODIUM 125 MCG
50 TABLET ORAL DAILY
Refills: 0 | Status: DISCONTINUED | OUTPATIENT
Start: 2020-02-20 | End: 2020-02-27

## 2020-02-20 RX ORDER — LEVETIRACETAM 250 MG/1
125 TABLET, FILM COATED ORAL
Refills: 0 | Status: DISCONTINUED | OUTPATIENT
Start: 2020-02-20 | End: 2020-02-27

## 2020-02-20 RX ADMIN — PANTOPRAZOLE SODIUM 80 MILLIGRAM(S): 20 TABLET, DELAYED RELEASE ORAL at 15:17

## 2020-02-20 RX ADMIN — PANTOPRAZOLE SODIUM 10 MG/HR: 20 TABLET, DELAYED RELEASE ORAL at 23:22

## 2020-02-20 RX ADMIN — INSULIN GLARGINE 10 UNIT(S): 100 INJECTION, SOLUTION SUBCUTANEOUS at 23:23

## 2020-02-20 RX ADMIN — SEVELAMER CARBONATE 800 MILLIGRAM(S): 2400 POWDER, FOR SUSPENSION ORAL at 23:22

## 2020-02-20 RX ADMIN — Medication 50 MILLIGRAM(S): at 22:49

## 2020-02-20 RX ADMIN — Medication 3 MILLIGRAM(S): at 22:48

## 2020-02-20 RX ADMIN — SENNA PLUS 1 TABLET(S): 8.6 TABLET ORAL at 22:48

## 2020-02-20 RX ADMIN — HEPARIN SODIUM 1300 UNIT(S)/HR: 5000 INJECTION INTRAVENOUS; SUBCUTANEOUS at 22:49

## 2020-02-20 RX ADMIN — ATORVASTATIN CALCIUM 80 MILLIGRAM(S): 80 TABLET, FILM COATED ORAL at 22:49

## 2020-02-20 NOTE — H&P ADULT - NSHPSOCIALHISTORY_GEN_ALL_CORE
Smoking:   Alcohol  Recreational drugs: Patient currently residing at [facility].  Denied history tobacco, EtOH, and illicit drug use.  Patient has been receiving HD since 6/2019; currently on transplant list.  Denied recent travel or sick contacts.

## 2020-02-20 NOTE — ED PROVIDER NOTE - PMH
CAD (Coronary Artery Disease)  with Stents in 06/2009 and 6/2019  CRI (Chronic Renal Insufficiency)    Diabetes    Dyslipidemia    Hypertension    Hypothyroidism

## 2020-02-20 NOTE — H&P ADULT - PROBLEM SELECTOR PLAN 5
BP elevated in the ED to 190s.   - c/w Coreg 3.125mg BID  - if BP remains elevated, can consider 5mg IV hydralazine

## 2020-02-20 NOTE — ED ADULT NURSE REASSESSMENT NOTE - NS ED NURSE REASSESS COMMENT FT1
Received report from Alejandra BURGER at 1440. Pt sitting comfortable at present. Family at bedside. MD at bedside to place US guided IV.

## 2020-02-20 NOTE — ED PROVIDER NOTE - ATTENDING CONTRIBUTION TO CARE
attending Phil: 64yM h/o CAD s/p stents 2009 and 2019, HTN, DM, Carotid stenosis, Hypothyroidism, and ESRD on HD, recently admitted for stroke s/p TPA (now on ASA/Plavix) w/ hospital stay c/b afib (now on Eliquis) and status epilepticus requiring intubation, pt now extubated and w/ PEG tube presents from rehab w/ low hgb. Pt with one episode of dark stools 5 days ago reportedly positive for occult blood. Will obtain labs including type and screen, transfuse PRN, admit

## 2020-02-20 NOTE — CONSULT NOTE ADULT - SUBJECTIVE AND OBJECTIVE BOX
[facility]   MD NINA MASON MD RUORU WONG, PA    TEL:  OFFICE: [phone]  DR HSU CELL: [phone]  RAS MORELOS CELL: [phone]  DR. MARQUEZ CELL: [phone]  DR. ALEX CELl: [phone]    FROM 5 PM- 7 AM PLEASE CALL ANSWERING SERVICE AT [phone]    -- INITIAL RENAL CONSULT NOTE  --------------------------------------------------------------------------------  HPI:   64 year old man with a history of CAD s/p stents 2009 and 2019, HTN, DM, Carotid stenosis, Hypothyroidism, and ESRD on HD, recently admitted for multiple intracerebral infarcts and is s/p TPA (now on ASA/Plavix) w/ hospital stay c/b afib (now on Eliquis) and status epilepticus requiring intubation, pt now extubated and w/ PEG tube in process of transitioning to whole foods, presenting from rehab w/ low hgb.       PAST HISTORY  --------------------------------------------------------------------------------  PAST MEDICAL & SURGICAL HISTORY:  CVA (cerebral vascular accident): 12/13/19 with residual bilateral weakness  Hypothyroidism  CRI (Chronic Renal Insufficiency)  CAD (Coronary Artery Disease): with Stents in 06/2009 and 6/2019  Dyslipidemia  Diabetes  Hypertension  History of insertion of stent into coronary artery bypass graft: 2009 and 2019    FAMILY HISTORY:    PAST SOCIAL HISTORY:    ALLERGIES & MEDICATIONS  --------------------------------------------------------------------------------  Allergies    No Known Allergies    Intolerances      Standing Inpatient Medications  pantoprazole  Injectable 80 milliGRAM(s) IV Push Once    PRN Inpatient Medications      REVIEW OF SYSTEMS  --------------------------------------------------------------------------------  Gen: No fevers/chills  Skin: No rashes  Head/Eyes/Ears: Normal hearing,  Normal vision   Respiratory: No dyspnea, cough  CV: No chest pain  GI: No abdominal pain, diarrhea, constipation, nausea, vomiting  : No dysuria, hematuria  MSK: No  edema  Heme: No easy bruising or bleeding  Psych: No significant depression    All other systems were reviewed and are negative, except as noted.    VITALS/PHYSICAL EXAM  --------------------------------------------------------------------------------  T(C): 37.1 (02-20-20 @ 13:11), Max: 37.1 (02-20-20 @ 13:11)  HR: 71 (02-20-20 @ 13:11) (71 - 71)  BP: 171/82 (02-20-20 @ 13:11) (171/82 - 171/82)  RR: 16 (02-20-20 @ 13:11) (16 - 16)  SpO2: 100% (02-20-20 @ 13:11) (100% - 100%)  Wt(kg): --        Physical Exam:  	Gen: NAD  	HEENT: MMM  	Pulm: CTA B/L  	CV: S1S2  	Abd: Soft, +BS   	Ext: No LE edema B/L  	Neuro: Awake  	Skin: Warm and dry  	Vascular access:    LABS/STUDIES  --------------------------------------------------------------------------------              7.1    11.23 >-----------<  289      [02-20-20 @ 14:15]              22.8                 Creatinine Trend:    Urinalysis - [01-02-20 @ 20:34]      Color Light Yellow / Appearance Slightly Turbid / SG 1.012 / pH 7.0      Gluc 100 mg/dL / Ketone Negative  / Bili Negative / Urobili Negative       Blood Small / Protein 300 mg/dL / Leuk Est Moderate / Nitrite Negative      RBC 1 / WBC 46 / Hyaline 1 / Gran  / Sq Epi  / Non Sq Epi 0 / Bacteria Many      Iron 45, TIBC 145, %sat 31      [12-26-19 @ 23:24]  Ferritin 1369      [12-26-19 @ 23:24]  PTH -- (Ca 6.9)      [06-05-19 @ 08:14]   562  HbA1c 8.4      [12-15-19 @ 11:00]  TSH 6.22      [01-03-20 @ 10:03]  Lipid: chol 134, , HDL 56, LDL 52      [12-15-19 @ 10:56]    HBsAb >1000.0      [12-14-19 @ 23:48]  HBsAb Reactive      [03-13-17 @ 19:00]  HBsAg Nonreact      [12-14-19 @ 23:48]  HBcAb Reactive      [06-03-19 @ 15:57]  HCV 0.15, Nonreact      [12-14-19 @ 23:48]  HIV Nonreact      [03-13-17 @ 19:00] [facility] [place] [facility]   MD NINA MASON MD RUORU WONG, PA    TEL:  OFFICE: [phone]  DR HSU CELL: [phone]  [place] CELL: [phone]  DR. MARQUEZ CELL: [phone]  DR. ALEX CELl: [phone]    FROM 5 PM- 7 AM PLEASE CALL ANSWERING SERVICE AT [phone]    -- INITIAL RENAL CONSULT NOTE  --------------------------------------------------------------------------------  HPI:   64 year old man with a history of CAD s/p stents 2009 and 2019, HTN, DM, Carotid stenosis, Hypothyroidism, and ESRD on HD, recently admitted for multiple intracerebral infarcts and is s/p TPA (now on ASA/Plavix) w/ hospital stay c/b afib (now on Eliquis) and status epilepticus requiring intubation, pt now extubated and w/ PEG tube in process of transitioning to whole foods, presenting from rehab w/ low hgb.       PAST HISTORY  --------------------------------------------------------------------------------  PAST MEDICAL & SURGICAL HISTORY:  CVA (cerebral vascular accident): 12/13/19 with residual bilateral weakness  Hypothyroidism  CRI (Chronic Renal Insufficiency)  CAD (Coronary Artery Disease): with Stents in 06/2009 and 6/2019  Dyslipidemia  Diabetes  Hypertension  History of insertion of stent into coronary artery bypass graft: 2009 and 2019    FAMILY HISTORY:    PAST SOCIAL HISTORY:    ALLERGIES & MEDICATIONS  --------------------------------------------------------------------------------  Allergies    No Known Allergies    Intolerances      Standing Inpatient Medications  pantoprazole  Injectable 80 milliGRAM(s) IV Push Once    PRN Inpatient Medications      REVIEW OF SYSTEMS  --------------------------------------------------------------------------------  Gen: No fevers/chills  Skin: No rashes  Head/Eyes/Ears: Normal hearing,  Normal vision   Respiratory: No dyspnea, cough  CV: No chest pain  GI: No abdominal pain, diarrhea, constipation, nausea, vomiting  : No dysuria, hematuria  MSK: No  edema  Heme: No easy bruising or bleeding  Psych: No significant depression    All other systems were reviewed and are negative, except as noted.    VITALS/PHYSICAL EXAM  --------------------------------------------------------------------------------  T(C): 37.1 (02-20-20 @ 13:11), Max: 37.1 (02-20-20 @ 13:11)  HR: 71 (02-20-20 @ 13:11) (71 - 71)  BP: 171/82 (02-20-20 @ 13:11) (171/82 - 171/82)  RR: 16 (02-20-20 @ 13:11) (16 - 16)  SpO2: 100% (02-20-20 @ 13:11) (100% - 100%)  Wt(kg): --        Physical Exam:  	Gen: NAD  	HEENT: MMM  	Pulm: CTA B/L  	CV: S1S2  	Abd: Soft, +BS   	Ext: No LE edema B/L  	Neuro: Awake alert  	Skin: Warm and dry  	Vascular access: tunneled HD catheter     LABS/STUDIES  --------------------------------------------------------------------------------              7.1    11.23 >-----------<  289      [02-20-20 @ 14:15]              22.8                 Creatinine Trend:    Urinalysis - [01-02-20 @ 20:34]      Color Light Yellow / Appearance Slightly Turbid / SG 1.012 / pH 7.0      Gluc 100 mg/dL / Ketone Negative  / Bili Negative / Urobili Negative       Blood Small / Protein 300 mg/dL / Leuk Est Moderate / Nitrite Negative      RBC 1 / WBC 46 / Hyaline 1 / Gran  / Sq Epi  / Non Sq Epi 0 / Bacteria Many      Iron 45, TIBC 145, %sat 31      [12-26-19 @ 23:24]  Ferritin 1369      [12-26-19 @ 23:24]  PTH -- (Ca 6.9)      [06-05-19 @ 08:14]   562  HbA1c 8.4      [12-15-19 @ 11:00]  TSH 6.22      [01-03-20 @ 10:03]  Lipid: chol 134, , HDL 56, LDL 52      [12-15-19 @ 10:56]    HBsAb >1000.0      [12-14-19 @ 23:48]  HBsAb Reactive      [03-13-17 @ 19:00]  HBsAg Nonreact      [12-14-19 @ 23:48]  HBcAb Reactive      [06-03-19 @ 15:57]  HCV 0.15, Nonreact      [12-14-19 @ 23:48]  HIV Nonreact      [03-13-17 @ 19:00]

## 2020-02-20 NOTE — H&P ADULT - ASSESSMENT
63 yo M w/ PMH HTN, T2DM, CAD s/p stent 2009, 2019, carotid stenosis, ESRD on HD ([facility]), CVA (12/2019) s/p tpa (on plavix) c/b dysphagia s/p PEG, a. fib (on eliquis) who presents from Rehab facility w/a hgb level of 6.6; hgb noted to be 7.1 on presentation, subsequently admitted for GI w/u

## 2020-02-20 NOTE — H&P ADULT - NSICDXPASTMEDICALHX_GEN_ALL_CORE_FT
PAST MEDICAL HISTORY:  CAD (Coronary Artery Disease) with Stents in 06/2009 and 6/2019    CRI (Chronic Renal Insufficiency)     CVA (cerebral vascular accident) 12/13/19 with residual bilateral weakness    Diabetes     Dyslipidemia     Hypertension     Hypothyroidism

## 2020-02-20 NOTE — ED ADULT NURSE NOTE - PMH
CAD (Coronary Artery Disease)  with Stents in 06/2009 and 6/2019  CRI (Chronic Renal Insufficiency)    CVA (cerebral vascular accident)  12/13/19 with residual bilateral weakness  Diabetes    Dyslipidemia    Hypertension    Hypothyroidism

## 2020-02-20 NOTE — H&P ADULT - NSHPLABSRESULTS_GEN_ALL_CORE
The Labs were reviewed by me   The Radiology was reviewed by me    EKG tracing reviewed by me    02-20    128<L>  |  90<L>  |  49<H>  ----------------------------<  78  4.4   |  24  |  3.88<H>    Ca    9.2      20 Feb 2020 14:15    TPro  8.3  /  Alb  3.6  /  TBili  0.2  /  DBili  x   /  AST  53<H>  /  ALT  35  /  AlkPhos  132<H>  02-20          PT/INR - ( 20 Feb 2020 15:15 )   PT: 17.5 sec;   INR: 1.50 ratio         PTT - ( 20 Feb 2020 15:15 )  PTT:34.3 sec                                        7.1    11.23 )-----------( 289      ( 20 Feb 2020 14:15 )             22.8     CAPILLARY BLOOD GLUCOSE The Labs were reviewed by me   The Radiology was reviewed by me    EKG tracing reviewed by me    02-20    128<L>  |  90<L>  |  49<H>  ----------------------------<  78  4.4   |  24  |  3.88<H>    Ca    9.2      20 Feb 2020 14:15    TPro  8.3  /  Alb  3.6  /  TBili  0.2  /  DBili  x   /  AST  53<H>  /  ALT  35  /  AlkPhos  132<H>  02-20  PT/INR - ( 20 Feb 2020 15:15 )   PT: 17.5 sec;   INR: 1.50 ratio    PTT - ( 20 Feb 2020 15:15 )  PTT:34.3 sec                                        7.1    11.23 )-----------( 289      ( 20 Feb 2020 14:15 )             22.8 The Labs were reviewed by me   The Radiology was reviewed by me    EKG tracing reviewed by me: NSR    02-20    128<L>  |  90<L>  |  49<H>  ----------------------------<  78  4.4   |  24  |  3.88<H>    Ca    9.2      20 Feb 2020 14:15    TPro  8.3  /  Alb  3.6  /  TBili  0.2  /  DBili  x   /  AST  53<H>  /  ALT  35  /  AlkPhos  132<H>  02-20  PT/INR - ( 20 Feb 2020 15:15 )   PT: 17.5 sec;   INR: 1.50 ratio    PTT - ( 20 Feb 2020 15:15 )  PTT:34.3 sec                                        7.1    11.23 )-----------( 289      ( 20 Feb 2020 14:15 )             22.8

## 2020-02-20 NOTE — ED PROVIDER NOTE - OBJECTIVE STATEMENT
64 year old man with a history of CAD s/p stents 2009 and 2019, HTN, DM, Carotid stenosis, Hypothyroidism, and ESRD on HD, recently admitted for multiple intracerebral infarcts and is s/p TPA (now on ASA/Plavix) w/ hospital stay c/b afib (now on Eliquis) and status epilepticus requiring intubation, pt now extubated and w/ PEG tube in process of transitioning to whole foods, presenting from rehab w/ low hgb. Pt had one episode of dark stools 5 days ago that tested positive for occult blood but no additional dark stools, so rehab opted to simply observe pt as he was otherwise asymptomatic and progressing well. However, labs drawn at HD session yesterday showed hgb 6.6, and pt instructed to go to ER today for transfusion and further evaluation. Pt continues to deny lightheadedness/dizziness, denies SOB or CP.

## 2020-02-20 NOTE — H&P ADULT - PROBLEM SELECTOR PLAN 9
Transitions of Care Status:  1.  Name of PCP:  2.  PCP Contacted on Admission: [ ] Y    [ ] N    3.  PCP contacted at Discharge: [ ] Y    [ ] N    [ ] N/A  4.  Post-Discharge Appointment Date and Location:  5.  Summary of Handoff given to PCP: DVT prophylaxis: heparin gtt  GI: protonix gtt  Diet: NPO for now, pending nutrition consult and s/s evaluation

## 2020-02-20 NOTE — H&P ADULT - HISTORY OF PRESENT ILLNESS
**THIS NOTE IS INCOMPLETE***    Patient is a 64 y.o M w/ PMH HTN, **THIS NOTE IS INCOMPLETE***    Patient is a 64 y.o M w/ PMH HTN, T2DM, CAD s/p stent 2009, 2019, carotid stenosis, ESRD on HD (MWF), CVA (12/2019) s/p tpa (on plavix) c/b dysphagia s/p PEG, a. fib (on eliquis) who presents from HD w/ a hgb level of 6.6. He endorsed having a BM w/ dark stools a few days ago     Of note, pt was last hospitalized from 12/19 - 1/14 for status epilepticus s/p intubation and PEG in the setting of embolic bianchi. Course was c/b a. fib for which he was started on eliquis. He was noted to have carotid stenosis and he was continued on plavix at discharge.     In the ED, vitals Tmax 98.8, HR 71-77 /82 - 174/89 RR 16 SPO2 100% on RA. Notable labs: hgb 7.1, WBC 11.23. S/p protonix gtt and 1 U pRBC Patient is a 64 y.o M w/ PMH HTN, T2DM, CAD s/p stent 2009, 2019, carotid stenosis, ESRD on HD (MWF), CVA (12/2019) s/p tpa (on plavix) c/b dysphagia s/p PEG, a. fib (on eliquis) who presents from HD w/ a hgb level of 6.6. He endorsed having a BM w/ dark stools on Saturday, 2/15, but none after that. His last BM was on the morning of admission, which was "normal" and dark brown in color. Patient has history of anemia and has received EPO in the past with HD, but per patient's son at the bedside, it has been quite some time since he has received it; receive 1 blood transfusion during his previous hospital admission. Patient currently states he is asymptomatic; he denied HA, dizziness, lightheadedness, fever, chills, nausea, vomiting, wheezing, SOB, CP, palpitations, abdominal pain, melena, and hematochezia. He does endorse a chronic cough, for which he has been receiving Robitussin.    Of note, pt was last hospitalized from 12/19 - 1/14 for status epilepticus s/p intubation and PEG in the setting of embolic bianchi. Course was c/b a. fib for which he was started on eliquis. He was noted to have carotid stenosis and he was continued on plavix at discharge.     In the ED, vitals Tmax 98.8, HR 71-77 /82 - 174/89 RR 16 SPO2 100% on RA. Notable labs: hgb 7.1, WBC 11.23. S/p protonix gtt and 1 U pRBC Patient is a 65 yo M w/ PMH HTN, T2DM, CAD s/p stent 2009, 2019, carotid stenosis, ESRD on HD (MWF), CVA (12/2019) s/p tpa (on plavix) c/b dysphagia s/p PEG, a. fib (on eliquis) who presents from rehab facility w/ a hgb level of 6.6. He endorsed having a BM w/ dark stools on Saturday, 2/15, but none after that. His last BM was on the morning of admission, which was "normal" and dark brown in color. Patient has history of anemia and has received EPO in the past with HD, but per patient's son at the bedside, it has been quite some time since he has received it; receive 1 blood transfusion during his previous hospital admission. Had EGD approximately 2 years ago that showed gastritis, but was otherwise unremarkable. Patient currently states he is asymptomatic; he denied HA, dizziness, lightheadedness, fever, chills, nausea, vomiting, wheezing, SOB, CP, palpitations, abdominal pain, melena, and hematochezia. He does endorse a chronic cough, for which he has been receiving Robitussin.    Of note, pt was last hospitalized from 12/19 - 1/14 for status epilepticus s/p intubation and PEG in the setting of embolic bianchi. Course was c/b a. fib for which he was started on eliquis. He was noted to have carotid stenosis and he was continued on plavix at discharge. From his stroke, he has residual slurring of speech, weakness of both legs, as well as weakness in his UE's, right > left. No sensory deficits.     In the ED, vitals Tmax 98.8, HR 71-77 /82 - 174/89 RR 16 SPO2 100% on RA. Notable labs: hgb 7.1, WBC 11.23. S/p protonix gtt and 1 U pRBC.

## 2020-02-20 NOTE — H&P ADULT - ATTENDING COMMENTS
Patient seen and examined. 64 year old male with history of CABG s/p CARLA (6/2019, 2009) carotid stenosis, ESRD On MWF HD, CVA (12/2019) presenting with acute blood loss anemia secondary to GIB.     1. GIB: Likely upper, and based on history potentially has resolved. Differential includes ulcer vs gastritis vs AVM. He is hemodymically stable. We are holding Plavix and Eliquis. However considering CHADSVASC, recent CVA within 3 months we will place on heparin drip and monitor blood counts frequently. We have consulted GI and await official recommendations.     Rest per resident note

## 2020-02-20 NOTE — CONSULT NOTE ADULT - ASSESSMENT
64 year old man with a history of CAD s/p stents 2009 and 2019, HTN, DM, Carotid stenosis, Hypothyroidism, and ESRD on HD, recently admitted for multiple intracerebral infarcts and is s/p TPA (now on ASA/Plavix) w/ hospital stay c/b afib (now on Eliquis) and status epilepticus requiring intubation, pt now extubated and w/ PEG tube in process of transitioning to whole foods, presenting from rehab w/ low hgb.     ESRD on HD via tunnelled HD cathter   on MWF at rehab  Plan for HD tomorrow  Monitor BMP and BP    Anemia  Hb low  pRBC if Hb<7.5  IGOR with HD   Monitor Hb    HTN  BP elevated  Resume outpt anti-hypertensives  Monitor BP    CKD-BMD  check PTH  Monitor serum calcium and PO4 64 year old man with a history of CAD s/p stents 2009 and 2019, HTN, DM, Carotid stenosis, Hypothyroidism, and ESRD on HD, recently admitted for multiple intracerebral infarcts and is s/p TPA (now on ASA/Plavix) w/ hospital stay c/b afib (now on Eliquis) and status epilepticus requiring intubation, pt now extubated and w/ PEG tube in process of transitioning to whole foods, presenting from rehab w/ low hgb.     ESRD on HD via tunnelled HD cathter   on MWF at rehab  Plan for HD tomorrow  Consent obtained from family   Monitor BMP and BP    Anemia  Hb low  pRBC if Hb<7.5  was not given IGOR sec to recent CVA  Monitor Hb    HTN  BP elevated  Resume outpt anti-hypertensives  Monitor BP    CKD-BMD  check PTH  Monitor serum calcium and PO4

## 2020-02-20 NOTE — ED ADULT NURSE REASSESSMENT NOTE - NS ED NURSE REASSESS COMMENT FT1
PRBC given. Consent in chart. Risks and benefits explained to patient. Patient verbalized understanding of risks and benefits. Patient aware of possible side effects. Vital signs stable.

## 2020-02-20 NOTE — ED ADULT NURSE NOTE - NSIMPLEMENTINTERV_GEN_ALL_ED
Implemented All Fall with Harm Risk Interventions:  [place] to call system. Call bell, personal items and telephone within reach. Instruct patient to call for assistance. Room bathroom lighting operational. Non-slip footwear when patient is off stretcher. Physically safe environment: no spills, clutter or unnecessary equipment. Stretcher in lowest position, wheels locked, appropriate side rails in place. Provide visual cue, wrist band, yellow gown, etc. Monitor gait and stability. Monitor for mental status changes and reorient to person, place, and time. Review medications for side effects contributing to fall risk. Reinforce activity limits and safety measures with patient and family. Provide visual clues: red socks.

## 2020-02-20 NOTE — ED PROCEDURE NOTE - PROCEDURE ADDITIONAL DETAILS
Ultrasound-guided cannulation of a peripheral vein in the left upper extremity 18g brachial vein    Dynamic gray scale imaging of the target vessel was obtained using a high frequency linear transducer.   Both the target vein and surrounding arterial structures were visualized and identified.   The patency of the targeted vein was confirmed with compression and lack of internal echoes.   There was direct visualization of needle entry into the vein followed by successful catheter placement    ~Brandon Rosenbaum D.O. -Resident

## 2020-02-20 NOTE — ED ADULT NURSE NOTE - OBJECTIVE STATEMENT
Pt is a 65 yo M who [facility] on labs drawn yesterday at dialysis. Son states pt had bloody stool x1 on 2/15, no further episodes since but labs showed low H/H. Pt A/O x3, denies CP/palpitations/sob, no abd pain/n/v/d. Pt Hx "bilateral stroke" on 12/13/20 with residual generalized weakness, pt unable to ambulate; has peg tube and rt chest shiley. Pt received dialysis yesterday.

## 2020-02-20 NOTE — H&P ADULT - PROBLEM SELECTOR PLAN 1
P/w hgb 7.1 (baseline 8-9) in setting of melena   -Likely 2/2 Upper GI bleed; differential dx in AVM vs. PUD   -c/w protonix gtt  -maintain active type and screen, 2 large IV bores   -GI consulted (via e-mail)   -will hold anticoagulation (eliquis and plavix) for now   -Hold P/w hgb 7.1 (baseline 8-9) in setting of melena on Saturday 2/15. Had "normal" BM this AM; CBC from HD yesterday was 6.6. Likely 2/2 Upper GI bleed; differential dx in AVM vs. PUD   - currently receiving 1 unit PRBC; will f/u post-transfusion CBC. Per nephrology, will transfuse if hgb <7.5  - c/w protonix gtt  - maintain active type and screen, 2 large IV bores   - GI consulted (via e-mail)   - will hold anticoagulation (eliquis and plavix) for now   - daily CBCs P/w hgb 7.1 (baseline 8-9) in setting of melena on Saturday 2/15. Had "normal" BM this AM; CBC from HD yesterday was 6.6. Likely 2/2 Upper GI bleed; differential dx in AVM vs. PUD   - currently receiving 1 unit PRBC; will f/u post-transfusion CBC. Per nephrology, will transfuse if hgb <7.5  - c/w protonix gtt  - maintain active type and screen, 2 large IV bores   - GI consulted (via e-mail)   - will hold anticoagulation (eliquis and plavix) for now   - CBCs q8h P/w hgb 7.1 (baseline 8-9) in setting of melena on Saturday 2/15. Had "normal" BM this AM; CBC from HD yesterday was 6.6. Likely 2/2 Upper GI bleed; differential dx in AVM vs. PUD   - currently receiving 1 unit PRBC; will f/u post-transfusion CBC. Per nephrology, will transfuse if hgb <7.5  - c/w protonix gtt  - maintain active type and screen, 2 large IV bores   - GI consulted (via e-mail)   - will hold eliquis and plavix. Will start heparin gtt given [id number] of 6  - CBCs q6h

## 2020-02-20 NOTE — ED ADULT NURSE REASSESSMENT NOTE - NS ED NURSE REASSESS COMMENT FT1
As per MD Timmons ok to proceed with blood transfusion with one [place].S. guided IV. As per MD timmons prioritize blood transfusion over protonix infusion, will start protonix infusion after blood transfusion finished.

## 2020-02-20 NOTE — H&P ADULT - PROBLEM SELECTOR PLAN 6
ESRD 2/2 diabetes; HD started in 6/2019. Currently awaiting kidney transplant; is on transplant list. MWF schedule.  - nephrology following, HD tomorrow  - f/u PTH in AM  - continue to monitor BMP, mg, and phos ESRD 2/2 diabetes; HD started in 6/2019. Currently awaiting kidney transplant; is on transplant list. MWF schedule.  - nephrology following, HD tomorrow  - c/w Midodrine 5mg prior to HD  - f/u PTH in AM  - continue to monitor daily BMP, mg, and phos

## 2020-02-20 NOTE — H&P ADULT - PROBLEM SELECTOR PLAN 8
DVT prophylaxis: hold in setting of bleed CAD  - S/p stents in 2009 and 6/2019.   - will hold Plavix  - c/w atorvastatin 80mg once daily    Hypothyroidism  - c/w Levothyroxine 50mcg once daily

## 2020-02-20 NOTE — H&P ADULT - PROBLEM SELECTOR PLAN 7
Hgb A1c 8.4 on December. Patient on 12 units Lantus qhs and sliding scale at rehab.  - c/w Lantus 12 units qhs and place on low correctional sliding scale  - f/u hgb A1C in the AM

## 2020-02-20 NOTE — H&P ADULT - PROBLEM SELECTOR PLAN 2
Likely 2/2 upper GIB. Presenting to ED with hgb 7.1, was 6.6 yesterday at HD. EGD in the past showing gastritis; currently not taking PPI or H2-blocker at rehab.  - per renal, transfuse hgb <7.5. Receiving 1 unit PRBC; check post-transfusion CBC; check q6h  - c/w protonix gtt  - maintain active type and screen, 2 large IV bores  - will hold anticoagulation (eliquis and plavix) for now

## 2020-02-20 NOTE — H&P ADULT - NSHPPHYSICALEXAM_GEN_ALL_CORE
Vital Signs Last 24 Hrs  T(C): 36.8 (02-20-20 @ 15:03), Max: 37.1 (02-20-20 @ 13:11)  T(F): 98.2 (02-20-20 @ 15:03), Max: 98.8 (02-20-20 @ 13:11)  HR: 76 (02-20-20 @ 15:03) (71 - 76)  BP: 174/89 (02-20-20 @ 15:03) (171/82 - 174/89)  RR: 16 (02-20-20 @ 15:03) (16 - 16)  SpO2: 100% (02-20-20 @ 15:03) (100% - 100%)    PHYSICAL EXAM:  GENERAL: Elderly male resting comfortably in bed in NAD; occasionally with violent cough, which family states is normal  HEAD:  Atraumatic, Normocephalic  EYES: EOMI, PERRLA, conjunctiva and sclera clear  ENMT: No tonsillar erythema, exudates, or enlargement; Moist mucous membranes, Good dentition, No lesions  NECK: Supple, Normal thyroid  CHEST/LUNG: b/l crackles  HEART: Regular rate and rhythm; No murmurs, rubs, or gallops  ABDOMEN: Soft, Nontender, Nondistended; Bowel sounds present; site of PEG-tube insertion clean and non-erythematous, no blood noted  EXTREMITIES: mild b/l LE edema  LYMPH: No lymphadenopathy noted  SKIN: No rashes or lesions noted  NERVOUS SYSTEM:  Alert & Oriented X3, Good concentration; diminished strength in UE and LE, but sensation intact. No facial droop, tongue midline

## 2020-02-20 NOTE — ED PROVIDER NOTE - CLINICAL SUMMARY MEDICAL DECISION MAKING FREE TEXT BOX
Pt p/w positive occult blood at rehab center and hgb 6.6, pt on ASA/Plavix and Eliquis for recent stroke and pAfib, pt to require transfusion and admission for serial blood tests and GI w/u of GIB if indicated in setting of continued bleeding -Philip

## 2020-02-20 NOTE — H&P ADULT - PROBLEM SELECTOR PLAN 3
PMH CVA c/b status epilepticus s/p intubation and PEG in 12/2019  -holding clopidogrel in setting of bleed  -will c/w keppra and vimpat for prophylaxis against seizure History of atrial fibrillation with [id number] of 6. Was taking Eliquis at home for AC  - will start heparin gtt  - monitor PTT and CBC q6h

## 2020-02-20 NOTE — H&P ADULT - PROBLEM SELECTOR PLAN 4
BP elevated in the ED to 190s.   - c/w Coreg 3.125mg BID  - if BP remains elevated, can consider 10mg IV labetalol PMHx CVA c/b status epilepticus s/p intubation and PEG in 12/2019  - holding clopidogrel in setting of bleed  - will c/w keppra and vimpat for prophylaxis against seizure

## 2020-02-21 ENCOUNTER — TRANSCRIPTION ENCOUNTER (OUTPATIENT)
Age: 65
End: 2020-02-21

## 2020-02-21 LAB
ANION GAP SERPL CALC-SCNC: 13 MMOL/L — SIGNIFICANT CHANGE UP (ref 5–17)
APTT BLD: 32.9 SEC — SIGNIFICANT CHANGE UP (ref 27.5–36.3)
APTT BLD: 96.7 SEC — HIGH (ref 27.5–36.3)
BUN SERPL-MCNC: 58 MG/DL — HIGH (ref 7–23)
CALCIUM SERPL-MCNC: 8.4 MG/DL — SIGNIFICANT CHANGE UP (ref 8.4–10.5)
CALCIUM SERPL-MCNC: 8.6 MG/DL — SIGNIFICANT CHANGE UP (ref 8.4–10.5)
CHLORIDE SERPL-SCNC: 96 MMOL/L — SIGNIFICANT CHANGE UP (ref 96–108)
CO2 SERPL-SCNC: 24 MMOL/L — SIGNIFICANT CHANGE UP (ref 22–31)
CREAT SERPL-MCNC: 4.69 MG/DL — HIGH (ref 0.5–1.3)
GLUCOSE BLDC GLUCOMTR-MCNC: 138 MG/DL — HIGH (ref 70–99)
GLUCOSE BLDC GLUCOMTR-MCNC: 87 MG/DL — SIGNIFICANT CHANGE UP (ref 70–99)
GLUCOSE SERPL-MCNC: 78 MG/DL — SIGNIFICANT CHANGE UP (ref 70–99)
HBA1C BLD-MCNC: 6 % — HIGH (ref 4–5.6)
HCT VFR BLD CALC: 20.7 % — CRITICAL LOW (ref 39–50)
HCT VFR BLD CALC: 24.5 % — LOW (ref 39–50)
HCT VFR BLD CALC: 26.6 % — LOW (ref 39–50)
HGB BLD-MCNC: 6.6 G/DL — CRITICAL LOW (ref 13–17)
HGB BLD-MCNC: 7.9 G/DL — LOW (ref 13–17)
HGB BLD-MCNC: 8.1 G/DL — LOW (ref 13–17)
MAGNESIUM SERPL-MCNC: 2.3 MG/DL — SIGNIFICANT CHANGE UP (ref 1.6–2.6)
MCHC RBC-ENTMCNC: 29.9 PG — SIGNIFICANT CHANGE UP (ref 27–34)
MCHC RBC-ENTMCNC: 30.1 PG — SIGNIFICANT CHANGE UP (ref 27–34)
MCHC RBC-ENTMCNC: 30.2 PG — SIGNIFICANT CHANGE UP (ref 27–34)
MCHC RBC-ENTMCNC: 30.5 GM/DL — LOW (ref 32–36)
MCHC RBC-ENTMCNC: 31.9 GM/DL — LOW (ref 32–36)
MCHC RBC-ENTMCNC: 32.2 GM/DL — SIGNIFICANT CHANGE UP (ref 32–36)
MCV RBC AUTO: 93.5 FL — SIGNIFICANT CHANGE UP (ref 80–100)
MCV RBC AUTO: 94.5 FL — SIGNIFICANT CHANGE UP (ref 80–100)
MCV RBC AUTO: 98.2 FL — SIGNIFICANT CHANGE UP (ref 80–100)
NRBC # BLD: 0 /100 WBCS — SIGNIFICANT CHANGE UP (ref 0–0)
OB PNL STL: POSITIVE
PHOSPHATE SERPL-MCNC: 3.1 MG/DL — SIGNIFICANT CHANGE UP (ref 2.5–4.5)
PLATELET # BLD AUTO: 230 K/UL — SIGNIFICANT CHANGE UP (ref 150–400)
PLATELET # BLD AUTO: 234 K/UL — SIGNIFICANT CHANGE UP (ref 150–400)
PLATELET # BLD AUTO: 236 K/UL — SIGNIFICANT CHANGE UP (ref 150–400)
POTASSIUM SERPL-MCNC: 3.7 MMOL/L — SIGNIFICANT CHANGE UP (ref 3.5–5.3)
POTASSIUM SERPL-SCNC: 3.7 MMOL/L — SIGNIFICANT CHANGE UP (ref 3.5–5.3)
PTH-INTACT FLD-MCNC: 93 PG/ML — HIGH (ref 15–65)
RBC # BLD: 2.19 M/UL — LOW (ref 4.2–5.8)
RBC # BLD: 2.62 M/UL — LOW (ref 4.2–5.8)
RBC # BLD: 2.71 M/UL — LOW (ref 4.2–5.8)
RBC # FLD: 17.9 % — HIGH (ref 10.3–14.5)
RBC # FLD: 18 % — HIGH (ref 10.3–14.5)
RBC # FLD: 18.1 % — HIGH (ref 10.3–14.5)
SODIUM SERPL-SCNC: 133 MMOL/L — LOW (ref 135–145)
WBC # BLD: 7.79 K/UL — SIGNIFICANT CHANGE UP (ref 3.8–10.5)
WBC # BLD: 7.97 K/UL — SIGNIFICANT CHANGE UP (ref 3.8–10.5)
WBC # BLD: 8.66 K/UL — SIGNIFICANT CHANGE UP (ref 3.8–10.5)
WBC # FLD AUTO: 7.79 K/UL — SIGNIFICANT CHANGE UP (ref 3.8–10.5)
WBC # FLD AUTO: 7.97 K/UL — SIGNIFICANT CHANGE UP (ref 3.8–10.5)
WBC # FLD AUTO: 8.66 K/UL — SIGNIFICANT CHANGE UP (ref 3.8–10.5)

## 2020-02-21 PROCEDURE — 93010 ELECTROCARDIOGRAM REPORT: CPT

## 2020-02-21 PROCEDURE — 99233 SBSQ HOSP IP/OBS HIGH 50: CPT | Mod: GC

## 2020-02-21 PROCEDURE — 99223 1ST HOSP IP/OBS HIGH 75: CPT

## 2020-02-21 RX ORDER — DEXTROSE 50 % IN WATER 50 %
25 SYRINGE (ML) INTRAVENOUS ONCE
Refills: 0 | Status: COMPLETED | OUTPATIENT
Start: 2020-02-21 | End: 2020-02-21

## 2020-02-21 RX ADMIN — Medication 50 MICROGRAM(S): at 05:08

## 2020-02-21 RX ADMIN — CARVEDILOL PHOSPHATE 3.12 MILLIGRAM(S): 80 CAPSULE, EXTENDED RELEASE ORAL at 05:08

## 2020-02-21 RX ADMIN — ESCITALOPRAM OXALATE 5 MILLIGRAM(S): 10 TABLET, FILM COATED ORAL at 12:12

## 2020-02-21 RX ADMIN — HEPARIN SODIUM 1300 UNIT(S)/HR: 5000 INJECTION INTRAVENOUS; SUBCUTANEOUS at 06:00

## 2020-02-21 RX ADMIN — Medication 0: at 17:33

## 2020-02-21 RX ADMIN — LEVETIRACETAM 250 MILLIGRAM(S): 250 TABLET, FILM COATED ORAL at 17:34

## 2020-02-21 RX ADMIN — LACOSAMIDE 50 MILLIGRAM(S): 50 TABLET ORAL at 17:42

## 2020-02-21 RX ADMIN — POLYETHYLENE GLYCOL 3350 17 GRAM(S): 17 POWDER, FOR SOLUTION ORAL at 12:12

## 2020-02-21 RX ADMIN — SEVELAMER CARBONATE 800 MILLIGRAM(S): 2400 POWDER, FOR SUSPENSION ORAL at 13:12

## 2020-02-21 RX ADMIN — LACOSAMIDE 50 MILLIGRAM(S): 50 TABLET ORAL at 05:07

## 2020-02-21 RX ADMIN — CARVEDILOL PHOSPHATE 3.12 MILLIGRAM(S): 80 CAPSULE, EXTENDED RELEASE ORAL at 17:34

## 2020-02-21 RX ADMIN — SEVELAMER CARBONATE 800 MILLIGRAM(S): 2400 POWDER, FOR SUSPENSION ORAL at 05:07

## 2020-02-21 RX ADMIN — Medication 0: at 12:14

## 2020-02-21 RX ADMIN — Medication 25 MILLILITER(S): at 09:53

## 2020-02-21 RX ADMIN — LEVETIRACETAM 250 MILLIGRAM(S): 250 TABLET, FILM COATED ORAL at 12:11

## 2020-02-21 RX ADMIN — PANTOPRAZOLE SODIUM 10 MG/HR: 20 TABLET, DELAYED RELEASE ORAL at 17:34

## 2020-02-21 RX ADMIN — LEVETIRACETAM 125 MILLIGRAM(S): 250 TABLET, FILM COATED ORAL at 06:01

## 2020-02-21 RX ADMIN — PANTOPRAZOLE SODIUM 10 MG/HR: 20 TABLET, DELAYED RELEASE ORAL at 06:53

## 2020-02-21 RX ADMIN — Medication 1 TABLET(S): at 12:12

## 2020-02-21 NOTE — DISCHARGE NOTE PROVIDER - NSDCMRMEDTOKEN_GEN_ALL_CORE_FT
acetaminophen 325 mg oral tablet: 2 tab(s) orally every 6 hours, As needed, for Mild and Moderate Pain (1 - 6)  albuterol 2.5 mg/3 mL (0.083%) inhalation solution: 3 milliliter(s) inhaled every 6 hours, As Needed  apixaban 5 mg oral tablet: 1 tab(s) by gastrostomy tube every 12 hours  carvedilol 3.125 mg oral tablet: 1 tab(s) by gastrostomy tube every 12 hours  clopidogrel 75 mg oral tablet: 1 tab(s) by gastrostomy tube once a day  ipratropium 500 mcg/2.5 mL inhalation solution: 1 inhaler(s) inhaled every 6 hours, As Needed  Lantus 100 units/mL subcutaneous solution: 12 unit(s) subcutaneous once a day (at bedtime)  levETIRAcetam 100 mg/mL oral solution: 1.25 milliliter(s) by gastrostomy tube once a day -HD days  levETIRAcetam 100 mg/mL oral solution: 2.5 milliliter(s) by gastrostomy tube 2 times a day  levothyroxine 50 mcg (0.05 mg) oral tablet: 1 tab(s) by gastrostomy tube once a day  Lexapro 5 mg oral tablet: 1 tab(s) orally through PEG tube  melatonin 3 mg oral tablet: 1 tab(s) by gastrostomy tube once a day (at bedtime)  midodrine 5 mg oral tablet: 1 tab(s) through PEG tube prior to HD  polyethylene glycol 3350 oral powder for reconstitution: 17 gram(s) by gastrostomy tube once a day  Liz-Windy oral tablet: 1 tab(s) orally once a day through PEG tube  Renvela 0.8 g oral powder for reconstitution: 1  orally 3 times a day at 6AM, 4PM, and 10PM  senna oral tablet: 1 tab(s) orally once a day (at bedtime) through PEG tube  simvastatin 80 mg oral tablet: 1 tab(s) orally once a day (at bedtime)  traZODone 50 mg oral tablet: 1 tab(s) orally once a day (at bedtime)  Vimpat 50 mg oral tablet: 1 tab(s) orally 2 times a day through PEG tube acetaminophen 325 mg oral tablet: 2 tab(s) orally every 6 hours, As needed, for Mild and Moderate Pain (1 - 6)  albuterol 2.5 mg/3 mL (0.083%) inhalation solution: 3 milliliter(s) inhaled every 6 hours, As Needed  apixaban 5 mg oral tablet: 1 tab(s) by gastrostomy tube every 12 hours  carvedilol 3.125 mg oral tablet: 1 tab(s) by gastrostomy tube every 12 hours  clopidogrel 75 mg oral tablet: 1 tab(s) by gastrostomy tube once a day  ipratropium 500 mcg/2.5 mL inhalation solution: 1 inhaler(s) inhaled every 6 hours, As Needed  Lantus 100 units/mL subcutaneous solution: 12 unit(s) subcutaneous once a day (at bedtime)  levETIRAcetam 100 mg/mL oral solution: 1.25 milliliter(s) by gastrostomy tube once a day -HD days  levETIRAcetam 100 mg/mL oral solution: 2.5 milliliter(s) by gastrostomy tube 2 times a day  levothyroxine 50 mcg (0.05 mg) oral tablet: 1 tab(s) by gastrostomy tube once a day  Lexapro 5 mg oral tablet: 1 tab(s) orally through PEG tube  melatonin 3 mg oral tablet: 1 tab(s) by gastrostomy tube once a day (at bedtime)  Norvasc 5 mg oral tablet: 1 tab(s) orally once a day via PEG  polyethylene glycol 3350 oral powder for reconstitution: 17 gram(s) by gastrostomy tube once a day  Liz-Windy oral tablet: 1 tab(s) orally once a day through PEG tube  Renvela 0.8 g oral powder for reconstitution: 1  orally 3 times a day at 6AM, 4PM, and 10PM  senna oral tablet: 1 tab(s) orally once a day (at bedtime) through PEG tube  simvastatin 80 mg oral tablet: 1 tab(s) orally once a day (at bedtime)  traZODone 50 mg oral tablet: 1 tab(s) orally once a day (at bedtime)  Vimpat 50 mg oral tablet: 1 tab(s) orally 2 times a day through PEG tube

## 2020-02-21 NOTE — OCCUPATIONAL THERAPY INITIAL EVALUATION ADULT - IMPAIRED TRANSFERS: BED/CHAIR, REHAB EVAL
decreased sensation/impaired motor control/impaired coordination/decreased strength/impaired balance/narrow base of support

## 2020-02-21 NOTE — DISCHARGE NOTE PROVIDER - HOSPITAL COURSE
HPI:    Patient is a 63 yo M w/ PMH HTN, T2DM, CAD s/p stent 2009, 2019, carotid stenosis, ESRD on HD (MWF), CVA (12/2019) s/p tpa (on plavix) c/b dysphagia s/p PEG, a. fib (on eliquis) who presents from rehab facility w/ a hgb level of 6.6. He endorsed having a BM w/ dark stools on Saturday, 2/15, but none after that. His last BM was on the morning of admission, which was "normal" and dark brown in color. Patient has history of anemia and has received EPO in the past with HD, but per patient's son at the bedside, it has been quite some time since he has received it; receive 1 blood transfusion during his previous hospital admission. Had EGD approximately 2 years ago that showed gastritis, but was otherwise unremarkable. Patient currently states he is asymptomatic; he denied HA, dizziness, lightheadedness, fever, chills, nausea, vomiting, wheezing, SOB, CP, palpitations, abdominal pain, melena, and hematochezia. He does endorse a chronic cough, for which he has been receiving Robitussin.        Of note, pt was last hospitalized from 12/19 - 1/14 for status epilepticus s/p intubation and PEG in the setting of embolic bianchi. Course was c/b a. fib for which he was started on eliquis. He was noted to have carotid stenosis and he was continued on plavix at discharge. From his stroke, he has residual slurring of speech, weakness of both legs, as well as weakness in his UE's, right > left. No sensory deficits.         In the ED, vitals Tmax 98.8, HR 71-77 /82 - 174/89 RR 16 SPO2 100% on RA. Notable labs: hgb 7.1, WBC 11.23. S/p protonix gtt and 1 U pRBC.             Hospital Course: HPI:    Patient is a 63 yo M w/ PMH HTN, T2DM, CAD s/p stent 2009, 2019, carotid stenosis, ESRD on HD (MWF), CVA (12/2019) s/p tpa (on plavix) c/b dysphagia s/p PEG, a. fib (on eliquis) who presents from rehab facility w/ a hgb level of 6.6. He endorsed having a BM w/ dark stools on Saturday, 2/15, but none after that. His last BM was on the morning of admission, which was "normal" and dark brown in color. Patient has history of anemia and has received EPO in the past with HD, but per patient's son at the bedside, it has been quite some time since he has received it; receive 1 blood transfusion during his previous hospital admission. Had EGD approximately 2 years ago that showed gastritis, but was otherwise unremarkable. Patient currently states he is asymptomatic; he denied HA, dizziness, lightheadedness, fever, chills, nausea, vomiting, wheezing, SOB, CP, palpitations, abdominal pain, melena, and hematochezia. He does endorse a chronic cough, for which he has been receiving Robitussin.        Of note, pt was last hospitalized from 12/19 - 1/14 for status epilepticus s/p intubation and PEG in the setting of embolic bianchi. Course was c/b a. fib for which he was started on eliquis. He was noted to have carotid stenosis and he was continued on plavix at discharge. From his stroke, he has residual slurring of speech, weakness of both legs, as well as weakness in his UE's, right > left. No sensory deficits.         In the ED, vitals Tmax 98.8, HR 71-77 /82 - 174/89 RR 16 SPO2 100% on RA. Notable labs: hgb 7.1, WBC 11.23. S/p protonix gtt and 1 U pRBC.             Hospital Course:    On the night of admission, patient received 1 unit of PRBC for hgb of 7.1, while Plavix and Eliquis were held. Heparin gtt was instead started due to patient's [id number] score of 6. The following morning, his hgb dropped to 6.6, thus requiring a second unit of PRBC; heparin was subsequently held. Cardiology was consulted and recommended holding AC until patient's hgb had stabilized; GI was also consulted and opted for a conservative approach. Over the next 24 hours, patient had 2 dark BMs, but during that time, his hgb remained stable >8, and per GI recommendation, heparin gtt was started once more. However, patient's hgb would drop again, from 8.9 to 7.8 and heparin was discontinued. HPI:    Patient is a 65 yo M w/ PMH HTN, T2DM, CAD s/p stent 2009, 2019, carotid stenosis, ESRD on HD (MWF), CVA (12/2019) s/p tpa (on plavix) c/b dysphagia s/p PEG, a. fib (on eliquis) who presents from rehab facility w/ a hgb level of 6.6. He endorsed having a BM w/ dark stools on Saturday, 2/15, but none after that. His last BM was on the morning of admission, which was "normal" and dark brown in color. Patient has history of anemia and has received EPO in the past with HD, but per patient's son at the bedside, it has been quite some time since he has received it; receive 1 blood transfusion during his previous hospital admission. Had EGD approximately 2 years ago that showed gastritis, but was otherwise unremarkable. Patient currently states he is asymptomatic; he denied HA, dizziness, lightheadedness, fever, chills, nausea, vomiting, wheezing, SOB, CP, palpitations, abdominal pain, melena, and hematochezia. He does endorse a chronic cough, for which he has been receiving Robitussin.        Of note, pt was last hospitalized from 12/19 - 1/14 for status epilepticus s/p intubation and PEG in the setting of embolic bianchi. Course was c/b a. fib for which he was started on eliquis. He was noted to have carotid stenosis and he was continued on plavix at discharge. From his stroke, he has residual slurring of speech, weakness of both legs, as well as weakness in his UE's, right > left. No sensory deficits.         In the ED, vitals Tmax 98.8, HR 71-77 /82 - 174/89 RR 16 SPO2 100% on RA. Notable labs: hgb 7.1, WBC 11.23. S/p protonix gtt and 1 U pRBC.         Hospital Course:    On the night of admission, patient received 1 unit of PRBC for hgb of 7.1, while Plavix and Eliquis were held. Heparin gtt was instead started due to patient's [id number] score of 6. The following morning, his hgb dropped to 6.6, thus requiring a second unit of PRBC; heparin was subsequently held. Cardiology was consulted and recommended holding AC until patient's hgb had stabilized; GI was also consulted and opted for a conservative approach. Over the next 24 hours, patient had 2 dark BMs, but during that time, his hgb remained stable >8, and per GI recommendation, heparin gtt was started once more. However, patient's hgb would drop again, from 8.9 to 7.8 and heparin was discontinued. Upper endoscopy was conducted which showed no sources of bleeding. GI did not recommend any more interventions to diagnosis source of bleeding. Patient's hemoglobin remained stable w/ monitoring and plavix and eliquis was resumed. Patient is medically stable for discharge.

## 2020-02-21 NOTE — PROGRESS NOTE ADULT - PROBLEM SELECTOR PLAN 9
DVT prophylaxis: heparin gtt  GI: protonix gtt  Diet: NPO for now, pending nutrition consult and s/s evaluation DVT prophylaxis: holding AC in the setting of GIB  GI: protonix gtt  Diet: Dysphagia 2 diet with thin liquids with nepro supplement once daily; from 8PM to 8AM nepro @ 70cc/hr

## 2020-02-21 NOTE — CONSULT NOTE ADULT - SUBJECTIVE AND OBJECTIVE BOX
Chief Complaint:  Patient is a 64y old  Male who presents with a chief complaint of Anemia, concern for GIB (21 Feb 2020 10:23)      HPI: Patient is a 65 yo M w/ PMH HTN, T2DM, CAD s/p stent 2009, 2019, carotid stenosis, ESRD on HD (MWF), CVA (12/2019) s/p tpa (on plavix) c/b dysphagia s/p PEG, a. fib (on eliquis) who presents from rehab facility w/ a hgb level of 6.6. He endorsed having a BM w/ dark stools on Saturday, 2/15, but none after that. His last BM was on the morning of admission, which was "normal" and dark brown in color. Patient has history of anemia and has received EPO in the past with HD, but per patient's son at the bedside, it has been quite some time since he has received it; receive 1 blood transfusion during his previous hospital admission. Had EGD approximately 2 years ago that showed gastritis, but was otherwise unremarkable. Patient currently states he is asymptomatic; he denied HA, dizziness, lightheadedness, fever, chills, nausea, vomiting, wheezing, SOB, CP, palpitations, abdominal pain, melena, and hematochezia. He does endorse a chronic cough, for which he has been receiving Robitussin.    Of note, pt was last hospitalized from 12/19 - 1/14 for status epilepticus s/p intubation and PEG in the setting of embolic bianchi. Course was c/b a. fib for which he was started on eliquis. He was noted to have carotid stenosis and he was continued on plavix at discharge. From his stroke, he has residual slurring of speech, weakness of both legs, as well as weakness in his UE's, right > left. No sensory deficits.     In the ED, vitals Tmax 98.8, HR 71-77 /82 - 174/89 RR 16 SPO2 100% on RA. Notable labs: hgb 7.1, WBC 11.23. S/p protonix gtt and 1 U pRBC.     Allergies:  No Known Allergies      Medications:  atorvastatin 80 milliGRAM(s) Oral at bedtime  carvedilol 3.125 milliGRAM(s) Oral every 12 hours  dextrose 40% Gel 15 Gram(s) Oral once PRN  dextrose 5%. 1000 milliLiter(s) IV Continuous <Continuous>  dextrose 50% Injectable 12.5 Gram(s) IV Push once  dextrose 50% Injectable 25 Gram(s) IV Push once  dextrose 50% Injectable 25 Gram(s) IV Push once  escitalopram 5 milliGRAM(s) Oral daily  glucagon  Injectable 1 milliGRAM(s) IntraMuscular once PRN  insulin glargine Injectable (LANTUS) 10 Unit(s) SubCutaneous at bedtime  insulin lispro (HumaLOG) corrective regimen sliding scale   SubCutaneous every 6 hours  lacosamide Solution 50 milliGRAM(s) Oral two times a day  levETIRAcetam  Solution 125 milliGRAM(s) Oral <User Schedule>  levETIRAcetam  Solution 250 milliGRAM(s) Oral two times a day  levothyroxine 50 MICROGram(s) Oral daily  melatonin 3 milliGRAM(s) Oral at bedtime  Nephro-macie 1 Tablet(s) Oral daily  pantoprazole Infusion 8 mG/Hr IV Continuous <Continuous>  polyethylene glycol 3350 17 Gram(s) Oral daily  senna 1 Tablet(s) Oral at bedtime  sevelamer carbonate Powder 800 milliGRAM(s) Oral three times a day with meals  traZODone 50 milliGRAM(s) Oral at bedtime      PMHX/PSHX:  CVA (cerebral vascular accident)  Hypothyroidism  CRI (Chronic Renal Insufficiency)  CAD (Coronary Artery Disease)  CAD (Coronary Artery Disease)  Dyslipidemia  Diabetes  Hypertension  History of insertion of stent into coronary artery bypass graft  No significant past surgical history      Family history:  FH: HTN (hypertension)  No pertinent family history in first degree relatives  No pertinent family history in first degree relatives  No pertinent family history in first degree relatives      Social History:     ROS:     General:  No wt loss, fevers, chills, night sweats, fatigue,   Eyes:  Good vision, no reported pain  ENT:  No sore throat, pain, runny nose, dysphagia  CV:  No pain, palpitations, hypo/hypertension  Resp:  No dyspnea, cough, tachypnea, wheezing  GI:  No pain, No nausea, No vomiting, No diarrhea, No constipation, No weight loss, No fever, No pruritis, No rectal bleeding, No tarry stools, No dysphagia,  :  No pain, bleeding, incontinence, nocturia  Muscle:  No pain, weakness  Neuro:  No weakness, tingling, memory problems  Psych:  No fatigue, insomnia, mood problems, depression  Endocrine:  No polyuria, polydipsia, cold/heat intolerance  Heme:  No petechiae, ecchymosis, easy bruisability  Skin:  No rash, tattoos, scars, edema      PHYSICAL EXAM:   Vital Signs:  Vital Signs Last 24 Hrs  T(C): 36.7 (21 Feb 2020 09:15), Max: 37.1 (20 Feb 2020 13:11)  T(F): 98 (21 Feb 2020 09:15), Max: 98.8 (20 Feb 2020 13:11)  HR: 62 (21 Feb 2020 09:15) (62 - 76)  BP: 159/76 (21 Feb 2020 09:15) (121/61 - 192/90)  BP(mean): --  RR: 20 (21 Feb 2020 09:15) (16 - 20)  SpO2: 100% (21 Feb 2020 09:15) (98% - 100%)  Daily     Daily     GENERAL:  Appears stated age, well-groomed, well-nourished, no distress  HEENT:  NC/AT,  conjunctivae clear and pink, no thyromegaly, nodules, adenopathy, no JVD, sclera -anicteric  CHEST:  Full & symmetric excursion, no increased effort, breath sounds clear  HEART:  Regular rhythm, S1, S2, no murmur/rub/S3/S4, no abdominal bruit, no edema  ABDOMEN:  Soft, non-tender, non-distended, normoactive bowel sounds, +PEG c/d/i   EXTEREMITIES:  no cyanosis,clubbing or edema  SKIN:  No rash/erythema/ecchymoses/petechiae/wounds/abscess/warm/dry  NEURO:  awake, alert, responding appropriately     LABS:                        6.6    8.66  )-----------( 234      ( 21 Feb 2020 05:09 )             20.7     02-21    133<L>  |  96  |  58<H>  ----------------------------<  78  3.7   |  24  |  4.69<H>    Ca    8.6      21 Feb 2020 05:09  Phos  3.1     02-21  Mg     2.3     02-21    TPro  8.3  /  Alb  3.6  /  TBili  0.2  /  DBili  x   /  AST  53<H>  /  ALT  35  /  AlkPhos  132<H>  02-20    LIVER FUNCTIONS - ( 20 Feb 2020 14:15 )  Alb: 3.6 g/dL / Pro: 8.3 g/dL / ALK PHOS: 132 U/L / ALT: 35 U/L / AST: 53 U/L / GGT: x           PT/INR - ( 20 Feb 2020 15:15 )   PT: 17.5 sec;   INR: 1.50 ratio         PTT - ( 21 Feb 2020 05:09 )  PTT:96.7 sec        Imaging:

## 2020-02-21 NOTE — OCCUPATIONAL THERAPY INITIAL EVALUATION ADULT - FINE MOTOR COORDINATION TRAINING, OT EVAL
Goal: Pt will independently manipulate buttons/zippers/fasteners on shirts/pants in 4 weeks to increase independence for ADL performance

## 2020-02-21 NOTE — DIETITIAN INITIAL EVALUATION ADULT. - PROBLEM SELECTOR PLAN 1
P/w hgb 7.1 (baseline 8-9) in setting of melena on Saturday 2/15. Had "normal" BM this AM; CBC from HD yesterday was 6.6. Likely 2/2 Upper GI bleed; differential dx in AVM vs. PUD   - currently receiving 1 unit PRBC; will f/u post-transfusion CBC. Per nephrology, will transfuse if hgb <7.5  - c/w protonix gtt  - maintain active type and screen, 2 large IV bores   - GI consulted (via e-mail)   - will hold eliquis and plavix. Will start heparin gtt given [id number] of 6  - CBCs q6h

## 2020-02-21 NOTE — PROGRESS NOTE ADULT - PROBLEM SELECTOR PLAN 1
P/w hgb 7.1 (baseline 8-9) in setting of melena on Saturday 2/15. Had "normal" BM this AM; CBC from HD yesterday was 6.6. Likely 2/2 Upper GI bleed; differential dx in AVM vs. PUD   - currently receiving 1 unit PRBC; will f/u post-transfusion CBC. Per nephrology, will transfuse if hgb <7.5  - c/w protonix gtt  - maintain active type and screen, 2 large IV bores   - GI consulted (via e-mail)   - will hold eliquis and plavix. Will start heparin gtt given [id number] of 6  - CBCs q6h P/w hgb 7.1 (baseline 8-9) in setting of melena on Saturday 2/15. Had "normal" BM this AM; CBC from HD yesterday was 6.6. Likely 2/2 Upper GI bleed; differential dx in AVM vs. PUD.  - nonbleeding ulcers on previous EGD from December  - s/p 1 PRBC 2/20 and 1 unit 2/21  - c/w protonix gtt  - maintain active type and screen, 2 large IV bores   - GI following, appreciate recs.   - will hold eliquis and plavix. Will start heparin gtt given [id number] of 6  - CBCs q6h

## 2020-02-21 NOTE — PHYSICAL THERAPY INITIAL EVALUATION ADULT - TRANSFER SAFETY CONCERNS NOTED: SIT/STAND, REHAB EVAL
decreased step length/decreased balance during turns/decreased weight-shifting ability/decreased safety awareness/losing balance

## 2020-02-21 NOTE — PROGRESS NOTE ADULT - PROBLEM SELECTOR PLAN 10
Transitions of Care Status:  1.  Name of PCP:  2.  PCP Contacted on Admission: [ ] Y    [ ] N    3.  PCP contacted at Discharge: [ ] Y    [ ] N    [ ] N/A  4.  Post-Discharge Appointment Date and Location:  5.  Summary of Handoff given to PCP: Transitions of Care Status:  1.  Name of PCP: Dr. Morejon  2.  PCP Contacted on Admission: [ ] Y    [x] N; cardiology team is following    3.  PCP contacted at Discharge: [ ] Y    [ ] N    [ ] N/A  4.  Post-Discharge Appointment Date and Location:  5.  Summary of Handoff given to PCP:

## 2020-02-21 NOTE — DIETITIAN INITIAL EVALUATION ADULT. - ENTERAL
Recommend Nepro starting at 8pm; 70mL/hr x12 hours. Provides: 840mL formula, 611mL free water, 1512kcal and 68g protein (21.9kcal/kg and 1.0 g/kg based on UBW 68.9kg)

## 2020-02-21 NOTE — DISCHARGE NOTE PROVIDER - NSDCFUADDAPPT_GEN_ALL_CORE_FT
Please followup with nephrology for dialysis, gastroenterology for further management, and your primary care doctor within 1-2 weeks of discharge.

## 2020-02-21 NOTE — PHYSICAL THERAPY INITIAL EVALUATION ADULT - IMPAIRMENTS CONTRIBUTING TO GAIT DEVIATIONS, PT EVAL
decreased strength/impaired motor control/cognition/impaired coordination/impaired postural control/impaired balance

## 2020-02-21 NOTE — CONSULT NOTE ADULT - ASSESSMENT
Patient is a 64 year old man with a PMHx of HTN, T2DM, afib, CAD s/p CARLA to pLAD 6/2019 (on plavix/eliquis), carotid stenosis (R 50-69%, L w/ atheromatous disease but no elevated velocities), ESRD on HD (MWF), CVA (12/2019) s/p tpa, s/p PEG presenting from rehab facility w/ Hgb 6.6.     #Anemia/afib/CAD: Pt has baseline chronic anemia from CKD (Hgb 8-11). Had episode of dark stools last week. Responded to 1 U PRBC, but dropped 1 point this morning.   -Pt has CV 6 w/ recent stroke. Given concern for ongoing bleed, however, agree w/ holding AC for now  -F/u GI re EGD  -Would restart heparin gtt once Hgb stabilized. Likewise, given recent stent, would start antiplatelet therapy (asa 81 daily, not home plavix) once ok per GI  -Please obtain 12-lead EKG now  -Continue atorvastatin 80 mg daily  -Continue carvedilol 3.125 Q12H. Hold for BP <110    Ant Heard MD  Cardiology Fellow  All cardiology service information can be found 24/7 on [email or web address], password: [email or web address] Patient is a 64 year old man with a PMHx of HTN, T2DM, afib, CAD s/p CARLA to pLAD 6/2019 (on plavix/eliquis), carotid stenosis (R 50-69%, L w/ atheromatous disease but no elevated velocities), ESRD on HD (MWF), CVA (12/2019) s/p tpa, s/p PEG presenting from rehab facility w/ Hgb 6.6.     #Anemia/afib/CAD: Pt has baseline chronic anemia from CKD (Hgb 8-11). Had episode of dark stools last week. Responded to 1 U PRBC, but dropped 1 point this morning.   -Pt has CV 6 w/ recent stroke. Given concern for ongoing bleed, however, agree w/ holding AC for now  -F/u GI re EGD  -Would restart heparin gtt once Hgb stabilized. Likewise, given recent stent, would start antiplatelet therapy (asa 81 daily, not home plavix) once ok per GI  -Continue IV PPI  -Please obtain 12-lead EKG now  -Continue atorvastatin 80 mg daily  -Continue carvedilol 3.125 Q12H. Hold for BP <110    Ant Heard MD  Cardiology Fellow  All cardiology service information can be found 24/7 on [email or web address], password: [email or web address] Patient is a 64 year old man with a PMHx of HTN, T2DM, afib, CAD s/p CARLA to pLAD 6/2019 (on plavix/eliquis), carotid stenosis (R 50-69%, L w/ atheromatous disease but no elevated velocities), ESRD on HD (MWF), CVA (12/2019) s/p tpa, s/p PEG presenting from rehab facility w/ Hgb 6.6.     #Anemia/afib/CAD: Pt has baseline chronic anemia from CKD (Hgb 8-11). Had episode of dark stools last week. Responded to 1 U PRBC, but dropped 1 point this morning.   -Pt has CV 6 w/ recent stroke. Given concern for ongoing bleed, however, agree w/ holding AC for now  -F/u GI re EGD  -Would restart heparin gtt once Hgb stabilized. Likewise, given recent stent, would start antiplatelet therapy (asa 81 daily, not home plavix) once ok per GI  -Continue IV PPI  -Please obtain 12-lead EKG   -Continue atorvastatin 80 mg daily  -Continue carvedilol 3.125 Q12H. Hold for BP <110    Ant Heard MD  Cardiology Fellow  All cardiology service information can be found 24/7 on [email or web address], password: [email or web address] Patient is a 64 year old man with a PMHx of  HTN, T2DM, afib, CAD s/p CARLA to ? 2009, CARLA to OM1 9/2018, CARLA to pLAD 6/2019 (on plavix/eliquis), carotid stenosis, ESRD on HD (MWF), CVA (12/2019) s/p tpa presenting from rehab facility w/ Hgb 6.6.     #Anemia/afib/CAD: Pt has baseline chronic anemia from CKD (Hgb 8-11). Had episode of dark stools last week. Responded to 1 U PRBC, but dropped 1 point this morning.   -Pt has CV 6 w/ recent stroke. Given concern for ongoing bleed, however, agree w/ holding AC for now  -F/u GI re EGD  -Would restart heparin gtt once Hgb stabilized. Likewise, given recent stent, would start antiplatelet therapy (asa 81 daily, not home plavix) once ok per GI  -Continue IV PPI  -Please obtain 12-lead EKG   -Continue atorvastatin 80 mg daily  -Continue carvedilol 3.125 Q12H. Hold for BP <110    Ant Heard MD  Cardiology Fellow  All cardiology service information can be found 24/7 on [email or web address], password: [email or web address] Patient is a 64 year old man with a PMHx of  HTN, T2DM, afib, CAD s/p CARLA to ? 2009, CARLA to OM1 9/2018, CARLA to pLAD 6/2019 (on plavix/eliquis), carotid stenosis, ESRD on HD (MWF), CVA (12/2019) s/p tpa presenting from rehab facility w/ Hgb 6.6.     #Anemia/afib/CAD: Pt has baseline chronic anemia from CKD (Hgb 8-11). Had episode of dark stools last week. Responded to 1 U PRBC, but dropped 1 point this morning.   -Pt has CV 6 w/ recent stroke. Given concern for ongoing bleed, however, agree w/ holding AC for now  -F/u GI recs  -Continue IV PPI  -If Hgb stable tomorrow morning, would restart heparin gtt with close monitoring. If no drop in Hgb on heparin, would restart plavix 75 mg on Sunday  -If no drop in Hgb on both agents, would restart home eliquis  -Continue atorvastatin 80 mg daily  -Continue carvedilol 3.125 Q12H. Hold for BP <110    Ant Heard MD  Cardiology Fellow  All cardiology service information can be found 24/7 on [email or web address], password: [email or web address]

## 2020-02-21 NOTE — PROGRESS NOTE ADULT - PROBLEM SELECTOR PLAN 6
ESRD 2/2 diabetes; HD started in 6/2019. Currently awaiting kidney transplant; is on transplant list. MWF schedule.  - nephrology following, HD tomorrow  - c/w Midodrine 5mg prior to HD  - f/u PTH in AM  - continue to monitor daily BMP, mg, and phos

## 2020-02-21 NOTE — PHYSICAL THERAPY INITIAL EVALUATION ADULT - LIVES WITH, PROFILE
Pt admitted from rehab facility, s/p december 2019 CVA.  Pt and son at b/s report pt ambulating with assist and RW at rehab facility.

## 2020-02-21 NOTE — CONSULT NOTE ADULT - ASSESSMENT
Patient is a 63 yo M w/ PMH HTN, T2DM, CAD s/p stent 2009, 2019, carotid stenosis, ESRD on HD (MWF), CVA (12/2019) s/p tpa (on plavix) c/b dysphagia s/p PEG, a. fib (on eliquis) who presents from rehab facility w/ a hgb level of 6.6. He endorsed having a BM w/ dark stools on Saturday, 2/15 but has resolved as per son.     UGIB     - patient reportedly had melena x 1 last Saturday. As per son, melena has resolved, patient has been having normal Bms throughout the week. Last bm was yesterday and reportedly normal.   - s/p 2uprbc, awaiting f/u cbc for response  - patient is known to our practice, recently had EGD with PEG placement 12/27. EGD demonstrated some non-bleeding gastric ulcers.   - continue to hold a/c for now pending f/u cbc  - cont PPI infusion  - monitor cbc, transfuse prn  - monitor vitals  - monitor stool color  - cont bowel regimen   - d/w Dr. Witt, ok to resume feeds pending nutrition and s/s evaluation  - will follow

## 2020-02-21 NOTE — DIETITIAN INITIAL EVALUATION ADULT. - PROBLEM SELECTOR PLAN 9
DVT prophylaxis: heparin gtt  GI: protonix gtt  Diet: NPO for now, pending nutrition consult and s/s evaluation

## 2020-02-21 NOTE — OCCUPATIONAL THERAPY INITIAL EVALUATION ADULT - IMPAIRMENTS CONTRIBUTING IMPAIRED BED MOBILITY, REHAB EVAL
decreased strength/impaired balance/impaired coordination/impaired motor control/decreased sensation

## 2020-02-21 NOTE — OCCUPATIONAL THERAPY INITIAL EVALUATION ADULT - LIVES WITH, PROFILE
AS per pt and son, pt lives with spouse and adult son in 2 story private home with basement, +5 steps to enter with rails, bedroom/tub shower setup on 1st floor. pt has a walkin shower located in basement, 12 steps to descend with L rail down/children/spouse

## 2020-02-21 NOTE — PROGRESS NOTE ADULT - ASSESSMENT
64 year old man with a history of CAD s/p stents 2009 and 2019, HTN, DM, Carotid stenosis, Hypothyroidism, and ESRD on HD, recently admitted for multiple intracerebral infarcts and is s/p TPA (now on ASA/Plavix) w/ hospital stay c/b afib (now on Eliquis) and status epilepticus requiring intubation, pt now extubated and w/ PEG tube in process of transitioning to [employer], presenting from rehab w/ low hgb.     ESRD on HD via tunnelled HD cathter   on MWF at rehab  Plan for HD today  Consent obtained from family   Monitor BMP and BP    Anemia  Hb low  s/p 1 unit on 2/20 and 1 unit 2/21   Consider GI evaluation   was not given IGOR sec to recent CVA  Monitor Hb    HTN  BP improvin   continue current  anti-hypertensives  Monitor BP    CKD-BMD  check PTH  Monitor serum calcium and PO4

## 2020-02-21 NOTE — PROGRESS NOTE ADULT - SUBJECTIVE AND OBJECTIVE BOX
[facility]   MD NINA MASON MD RUORU WONG, PA    TEL:  OFFICE: [phone]  DR HSU CELL: [phone]  RAS MORELOS CELL: [phone]  DR. MARQUEZ CELL: [phone]  DR. ALEX CELL: [phone]    FROM 5 PM - 7 AM PLEASE CALL ANSWERING SERVICE: [phone]    RENAL FOLLOW UP NOTE  --------------------------------------------------------------------------------  HPI:      Pt seen and examined at bedside.   Denies SOB, chest pain     PAST HISTORY  --------------------------------------------------------------------------------  No significant changes to PMH, PSH, FHx, SHx, unless otherwise noted    ALLERGIES & MEDICATIONS  --------------------------------------------------------------------------------  Allergies    No Known Allergies    Intolerances      Standing Inpatient Medications  atorvastatin 80 milliGRAM(s) Oral at bedtime  carvedilol 3.125 milliGRAM(s) Oral every 12 hours  dextrose 5%. 1000 milliLiter(s) IV Continuous <Continuous>  dextrose 50% Injectable 12.5 Gram(s) IV Push once  dextrose 50% Injectable 25 Gram(s) IV Push once  dextrose 50% Injectable 25 Gram(s) IV Push once  escitalopram 5 milliGRAM(s) Oral daily  insulin glargine Injectable (LANTUS) 10 Unit(s) SubCutaneous at bedtime  insulin lispro (HumaLOG) corrective regimen sliding scale   SubCutaneous every 6 hours  lacosamide Solution 50 milliGRAM(s) Oral two times a day  levETIRAcetam  Solution 125 milliGRAM(s) Oral <User Schedule>  levETIRAcetam  Solution 250 milliGRAM(s) Oral two times a day  levothyroxine 50 MICROGram(s) Oral daily  melatonin 3 milliGRAM(s) Oral at bedtime  Nephro-macie 1 Tablet(s) Oral daily  pantoprazole Infusion 8 mG/Hr IV Continuous <Continuous>  polyethylene glycol 3350 17 Gram(s) Oral daily  senna 1 Tablet(s) Oral at bedtime  sevelamer carbonate Powder 800 milliGRAM(s) Oral three times a day with meals  traZODone 50 milliGRAM(s) Oral at bedtime    PRN Inpatient Medications  dextrose 40% Gel 15 Gram(s) Oral once PRN  glucagon  Injectable 1 milliGRAM(s) IntraMuscular once PRN      REVIEW OF SYSTEMS  --------------------------------------------------------------------------------  General: no fever  CVS: no chest pain  RESP: no sob, no cough  ABD: no abdominal pain  MSK: no edema     VITALS/PHYSICAL EXAM  --------------------------------------------------------------------------------  T(C): 36.7 (02-21-20 @ 09:15), Max: 37.1 (02-20-20 @ 13:11)  HR: 62 (02-21-20 @ 09:15) (62 - 76)  BP: 159/76 (02-21-20 @ 09:15) (121/61 - 192/90)  RR: 20 (02-21-20 @ 09:15) (16 - 20)  SpO2: 100% (02-21-20 @ 09:15) (98% - 100%)  Wt(kg): --    Weight (kg): 73.3 (02-20-20 @ 21:24)      02-20-20 @ 07:01  -  02-21-20 @ 07:00  --------------------------------------------------------  IN: 627 mL / OUT: 500 mL / NET: 127 mL    02-21-20 @ 07:01  -  02-21-20 @ 10:23  --------------------------------------------------------  IN: 0 mL / OUT: 0 mL / NET: 0 mL      Physical Exam:  	Gen: NAD  	HEENT: MMM  	Pulm: CTA B/L  	CV: S1S2  	Abd: Soft, +BS  	Ext: No LE edema B/L                      Neuro: Awake alert  	Skin: Warm and Dry   	Vascular access: perma cath    LABS/STUDIES  --------------------------------------------------------------------------------              6.6    8.66  >-----------<  234      [02-21-20 @ 05:09]              20.7     133  |  96  |  58  ----------------------------<  78      [02-21-20 @ 05:09]  3.7   |  24  |  4.69        Ca     8.6     [02-21-20 @ 05:09]      Mg     2.3     [02-21-20 @ 05:09]      Phos  3.1     [02-21-20 @ 05:09]    TPro  8.3  /  Alb  3.6  /  TBili  0.2  /  DBili  x   /  AST  53  /  ALT  35  /  AlkPhos  132  [02-20-20 @ 14:15]    PT/INR: PT 17.5 , INR 1.50       [02-20-20 @ 15:15]  PTT: 96.7       [02-21-20 @ 05:09]      Creatinine Trend:  SCr 4.69 [02-21 @ 05:09]  SCr 3.88 [02-20 @ 14:15]        Iron 45, TIBC 145, %sat 31      [12-26-19 @ 23:24]  Ferritin 1369      [12-26-19 @ 23:24]  PTH -- (Ca --)      [02-21-20 @ 08:54]   93  PTH -- (Ca 6.9)      [06-05-19 @ 08:14]   562  HbA1c 8.4      [12-15-19 @ 11:00]  TSH 6.22      [01-03-20 @ 10:03]  Lipid: chol 134, , HDL 56, LDL 52      [12-15-19 @ 10:56]

## 2020-02-21 NOTE — DISCHARGE NOTE PROVIDER - PROVIDER TOKENS
PROVIDER:[TOKEN:[[id number]]],PROVIDER:[TOKEN:[[id number]]],PROVIDER:[TOKEN:[[id number]]] PROVIDER:[TOKEN:[[id number]],FOLLOWUP:[1 week]],PROVIDER:[TOKEN:[[id number]],FOLLOWUP:[1 week]],PROVIDER:[TOKEN:[[id number]],FOLLOWUP:[1 week]]

## 2020-02-21 NOTE — PROGRESS NOTE ADULT - PROBLEM SELECTOR PLAN 2
Likely 2/2 upper GIB. Presenting to ED with hgb 7.1, was 6.6 yesterday at HD. EGD in the past showing gastritis; currently not taking PPI or H2-blocker at rehab.  - per renal, transfuse hgb <7.5. Receiving 1 unit PRBC; check post-transfusion CBC; check q6h  - c/w protonix gtt  - maintain active type and screen, 2 large IV bores  - will hold anticoagulation (eliquis and plavix) for now Likely 2/2 upper GIB. Presenting to ED with hgb 7.1, was 6.6 yesterday at HD. EGD in the past showing gastritis; currently not taking PPI or H2-blocker at rehab.  - per renal, transfuse hgb <7.5. S/p 2 unit PRBC, as above  - c/w protonix gtt  - maintain active type and screen, 2 large IV bores  - will hold anticoagulation (eliquis and plavix) for now

## 2020-02-21 NOTE — OCCUPATIONAL THERAPY INITIAL EVALUATION ADULT - IMPAIRED TRANSFERS: SIT/STAND, REHAB EVAL
decreased strength/decreased sensation/impaired motor control/impaired coordination/impaired balance

## 2020-02-21 NOTE — DISCHARGE NOTE PROVIDER - CARE PROVIDERS DIRECT ADDRESSES
,DirectAddress_Unknown,DirectAddress_Unknown,[email or web address]

## 2020-02-21 NOTE — PROGRESS NOTE ADULT - ASSESSMENT
65 yo M w/ PMH HTN, T2DM, CAD s/p stent 2009, 2019, carotid stenosis, ESRD on HD ([place]), CVA (12/2019) s/p tpa (on plavix) c/b dysphagia s/p PEG, a. fib (on eliquis) who presents from Rehab facility w/a hgb level of 6.6; hgb noted to be 7.1 on presentation, subsequently admitted for GI w/u

## 2020-02-21 NOTE — PHYSICAL THERAPY INITIAL EVALUATION ADULT - PERTINENT HX OF CURRENT PROBLEM, REHAB EVAL
64 year old man with pmhx of CAD s/p stents 2009 and 2019, HTN, DM, Carotid stenosis, Hypothyroidism, and ESRD on HD, recently admitted for multiple intracerebral infarcts, received TPA (now on ASA/Plavix) w/ hospital stay c/b afib (now on Eliquis) and status epilepticus requiring intubation. Pt then extubated and w/ PEG tube, now in process of transitioning to [employer], presents from rehab w/ low hgb. Subsequently admitted for GI w/u

## 2020-02-21 NOTE — PROVIDER CONTACT NOTE (OTHER) - RECOMMENDATIONS
BP medication? Follow Keppra orders as written? Hang Heparin and Protonix IV at this time? Draw STAT CBC?

## 2020-02-21 NOTE — PHYSICAL THERAPY INITIAL EVALUATION ADULT - GAIT TRAINING, PT EVAL
GOAL: Pt will ambulate 150 feet w/ contact guard assist, w/use of appropriate assistive device in 2 weeks

## 2020-02-21 NOTE — CONSULT NOTE ADULT - ATTENDING COMMENTS
64 year old man with a past medical history of HTN, T2DM, afib (CHADS2-Vasc; 7), CAD s/p CARLA 2009 unknown location, CARLA OM1 9/2018, CARLA to pLAD 6/2019 on clopidogrel 75 mg daily and apixaban 2.5 mg BID, carotid stenosis (R 50-69%, L w/ atheromatous disease but no elevated velocities), ESRD on HD (MWF), CVA (12/2019) s/p tPA s/p PEG presenting severe anemia in the setting of GIB with intermittent melena. GI consulted and are monitoring taking a conservative approach for the time being and use If her labs are stable with no obvious source of bleeding, resume heparin ggt to give some time for GI injury to heal while getting pantoprazole ggt and aggressive GI treatment. If the heparin ggt is tolerated, would resume clopidogrel 75 mg for recent stenting at critical site pLAD. If both agents are tolerated, can start apixaban 2.5 mg BID and discontinue the heparin ggt. If he does not tolerate the two agents due to progressive anemia and/or melena, will need to reconsider scoping and/or antiplatelet/anticoagulation holiday 2-3 days and similar stepwise resumption.  because both agents are strongly indicated with great mortality benefit. Continue other cardiac medications including atorvastatin 80 mg nightly, carvedilol 3.125 mg BID. Please call with questions.     Jose Maria Moise MD, MPH, PEREZ, RPVI, [facility] Center[facility]  Cardiovascular Specialist   Julia Mckenzie [facility]  c: [phone]  e: [email or web address]

## 2020-02-21 NOTE — CONSULT NOTE ADULT - SUBJECTIVE AND OBJECTIVE BOX
Patient seen and evaluated at bedside    Chief Complaint: Anemia     HPI: Patient is a 64 year old man with a PMHx of HTN, T2DM, afib, CAD s/p CARLA to pLAD 6/2019 (on plavix/eliquis), carotid stenosis (R 50-69%, L w/ atheromatous disease but no elevated velocities), ESRD on HD (MWF), CVA (12/2019) s/p tpa, s/p PEG presenting from rehab facility w/ Hgb 6.6. He endorsed having a BM w/ dark stools on Saturday, 2/15, but none after that. His last BM was on the morning of admission, which was "normal" and dark brown in color. Patient has history of anemia and has received EPO in the past with HD, but per patient's son at the bedside, it has been quite some time since he has received it; receive 1 blood transfusion during his previous hospital admission. Had EGD approximately 2 years ago that showed gastritis, but was otherwise unremarkable.     Of note, pt was last hospitalized from 12/19 - 1/14 for status epilepticus s/p intubation and PEG in the setting of embolic bianchi. Course was c/b a. fib for which he was started on eliquis. He was noted to have carotid stenosis and he was continued on plavix at discharge. From his stroke, he has residual slurring of speech, weakness of both legs, as well as weakness in his UE's, right > left. No sensory deficits.     In the ED, vitals Tmax 98.8, HR 71-77 /82 - 174/89 RR 16 SPO2 100% on RA. Notable labs: hgb 7.1, WBC 11.23. S/p protonix gtt and 1 U pRBC. Eliquis was held and pt was started on heparin gtt. Post transfusion hgb 8.1, now 6.1 this morning.     This morning, HR 67, /76.       PMH:   CVA (cerebral vascular accident)  Hypothyroidism  CRI (Chronic Renal Insufficiency)  CAD (Coronary Artery Disease)  CAD (Coronary Artery Disease)  Dyslipidemia  Diabetes  Hypertension      PSH:   History of insertion of stent into coronary artery bypass graft  No significant past surgical history      Medications:   atorvastatin 80 milliGRAM(s) Oral at bedtime  carvedilol 3.125 milliGRAM(s) Oral every 12 hours  dextrose 40% Gel 15 Gram(s) Oral once PRN  dextrose 5%. 1000 milliLiter(s) IV Continuous <Continuous>  dextrose 50% Injectable 12.5 Gram(s) IV Push once  dextrose 50% Injectable 25 Gram(s) IV Push once  dextrose 50% Injectable 25 Gram(s) IV Push once  escitalopram 5 milliGRAM(s) Oral daily  glucagon  Injectable 1 milliGRAM(s) IntraMuscular once PRN  insulin glargine Injectable (LANTUS) 10 Unit(s) SubCutaneous at bedtime  insulin lispro (HumaLOG) corrective regimen sliding scale   SubCutaneous every 6 hours  lacosamide Solution 50 milliGRAM(s) Oral two times a day  levETIRAcetam  Solution 125 milliGRAM(s) Oral <User Schedule>  levETIRAcetam  Solution 250 milliGRAM(s) Oral two times a day  levothyroxine 50 MICROGram(s) Oral daily  melatonin 3 milliGRAM(s) Oral at bedtime  Nephro-macie 1 Tablet(s) Oral daily  pantoprazole Infusion 8 mG/Hr IV Continuous <Continuous>  polyethylene glycol 3350 17 Gram(s) Oral daily  senna 1 Tablet(s) Oral at bedtime  sevelamer carbonate Powder 800 milliGRAM(s) Oral three times a day with meals  traZODone 50 milliGRAM(s) Oral at bedtime      Allergies:  No Known Allergies      FAMILY HISTORY:      Social History:  Smoking History:  Alcohol Use:  Drug Use:    Review of Systems:  REVIEW OF SYSTEMS:  CONSTITUTIONAL: No weakness, fevers or chills  EYES/ENT: No visual changes;  No dysphagia  NECK: No pain or stiffness  RESPIRATORY: No cough, wheezing, hemoptysis; No shortness of breath  CARDIOVASCULAR: No chest pain or palpitations; No lower extremity edema  GASTROINTESTINAL: No abdominal or epigastric pain. No nausea, vomiting, or hematemesis; No diarrhea or constipation. No melena or hematochezia.  BACK: No back pain  GENITOURINARY: No dysuria, frequency or hematuria  NEUROLOGICAL: No numbness or weakness  SKIN: No itching, burning, rashes, or lesions   All other review of systems is negative unless indicated above.    Physical Exam:  T(F): 97.5 (02-21), Max: 98.8 (02-20)  HR: 67 (02-21) (65 - 76)  BP: 155/76 (02-21) (121/61 - 192/90)  RR: 19 (02-21)  SpO2: 98% (02-21)  GENERAL: No acute distress, well-developed  HEAD:  Atraumatic, Normocephalic  ENT: EOMI, PERRLA, conjunctiva and sclera clear, Neck supple, No JVD, moist mucosa  CHEST/LUNG: Clear to auscultation bilaterally; No wheeze, equal breath sounds bilaterally   BACK: No spinal tenderness  HEART: Regular rate and rhythm; No murmurs, rubs, or gallops  ABDOMEN: Soft, Nontender, Nondistended; Bowel sounds present  EXTREMITIES:  No clubbing, cyanosis, or edema  PSYCH: Nl behavior, nl affect  NEUROLOGY: AAOx3, non-focal, cranial nerves intact  SKIN: Normal color, No rashes or lesions  LINES:    Cardiovascular Diagnostic Testing:    ECG: Personally reviewed    Echo:  TTE 1/3/20  Conclusions:  1. Mitral annular calcification, otherwise normal mitral  valve. Mild mitral regurgitation.  2. Normal left ventricular internal dimensions and wall  thicknesses.  3. Endocardium not well visualized; grossly normal left  ventricular systolic function.  4. The right ventricle is not well visualized; grossly  normal right ventricular systolic function. TV s' = 11  cm/sec.  5. Estimated right ventricular systolic pressure equals 35  mm Hg, assuming right atrial pressure equals 8 mm Hg,  consistent with borderline pulmonary hypertension.  *** No previous Echo exam.  ------------------------------------------------------------------------  Confirmed on  1/3/2020 - 23:27:08 by Mp Guerrero M.D.  ------------------------------------------------------------------------    Cath:  VENTRICLES: Global left ventricular function was normal. EF estimated was  60 %.  CORONARY VESSELS: The coronary circulationis left dominant.  LM:   --  LM: Angiography showed minor luminal irregularities with no flow  limiting lesions.  LAD:   --  Proximal LAD: There was a 80 % stenosis.  CX:   --  Circumflex: Angiography showed moderate atherosclerosis.  --  OM1: There was a 0 % stenosis at the site of a prior stent.  RCA:   --  Proximal RCA: The vessel was very small sized. Angiography  showed severe atherosclerosis.  COMPLICATIONS: There were no complications.  SUMMARY:  CORONARY VESSELS: LM: Angiography showed minor luminal irregularities with  no flow limiting lesions. Proximal LAD: There was a 80 % stenosis.  Circumflex: Angiography showed moderate atherosclerosis. OM1: There was a  0 % stenosis at the site of a prior stent. Proximal RCA: The vessel was  very small sized. Angiography showed severe atherosclerosis.  CARDIAC STRUCTURES: Global left ventricular function was normal. EF  estimated was 60 %.  DIAGNOSTIC RECOMMENDATIONS: OM IFR normal.  LAD IFR 0.52  INTERVENTIONAL RECOMMENDATIONS: Aspirin andplavix. Transplant delayed.  Prepared and signed by  Berenice Bose M.D.          Interpretation of Telemetry:    Imaging:    Labs: Personally reviewed                        6.6    8.66  )-----------( 234      ( 21 Feb 2020 05:09 )             20.7     02-21    133<L>  |  96  |  58<H>  ----------------------------<  78  3.7   |  24  |  4.69<H>    Ca    8.6      21 Feb 2020 05:09  Phos  3.1     02-21  Mg     2.3     02-21    TPro  8.3  /  Alb  3.6  /  TBili  0.2  /  DBili  x   /  AST  53<H>  /  ALT  35  /  AlkPhos  132<H>  02-20    PT/INR - ( 20 Feb 2020 15:15 )   PT: 17.5 sec;   INR: 1.50 ratio         PTT - ( 21 Feb 2020 05:09 )  PTT:96.7 sec Patient seen and evaluated at bedside    Chief Complaint: Anemia     HPI: Patient is a 64 year old man with a PMHx of HTN, T2DM, afib, CAD s/p CARLA to pLAD 6/2019 (on plavix/eliquis), carotid stenosis (R 50-69%, L w/ atheromatous disease but no elevated velocities), ESRD on HD (MWF), CVA (12/2019) s/p tpa, s/p PEG presenting from rehab facility w/ Hgb 6.6. He endorsed having a BM w/ dark stools on Saturday, 2/15, but none after that. His last BM was on the morning of admission, which was "normal" and dark brown in color. Patient has history of anemia and has received EPO in the past with HD, but per patient's son at the bedside, it has been quite some time since he has received it; receive 1 blood transfusion during his previous hospital admission. Had EGD approximately 2 years ago that showed gastritis, but was otherwise unremarkable.     Of note, pt was last hospitalized from 12/19 - 1/14 for status epilepticus s/p intubation and PEG in the setting of embolic bianchi. During that admission patient newly diagnosed with  afib for which he was started on eliquis. He was noted to have carotid stenosis and he was continued on plavix at discharge. From his stroke, he has residual slurring of speech, weakness of both legs, as well as weakness in his UE's, right > left. No sensory deficits.     In the ED, vitals Tmax 98.8, HR 71-77 /82 - 174/89 RR 16 SPO2 100% on RA. Notable labs: hgb 7.1, WBC 11.23. S/p protonix gtt and 1 U pRBC. Eliquis/plavix was held and pt was started on heparin gtt. Post transfusion hgb 8.1, now 6.1 this morning. Heparin gtt held,    This morning, HR 67, /76. Pt denies any cp, sob, dizziness. He has not had a BM since being in the hospital.    PMH:   CVA (cerebral vascular accident)  Hypothyroidism  CRI (Chronic Renal Insufficiency)  CAD (Coronary Artery Disease)  CAD (Coronary Artery Disease)  Dyslipidemia  Diabetes  Hypertension      PSH:   History of insertion of stent into coronary artery bypass graft  No significant past surgical history      Medications:   atorvastatin 80 milliGRAM(s) Oral at bedtime  carvedilol 3.125 milliGRAM(s) Oral every 12 hours  dextrose 40% Gel 15 Gram(s) Oral once PRN  dextrose 5%. 1000 milliLiter(s) IV Continuous <Continuous>  dextrose 50% Injectable 12.5 Gram(s) IV Push once  dextrose 50% Injectable 25 Gram(s) IV Push once  dextrose 50% Injectable 25 Gram(s) IV Push once  escitalopram 5 milliGRAM(s) Oral daily  glucagon  Injectable 1 milliGRAM(s) IntraMuscular once PRN  insulin glargine Injectable (LANTUS) 10 Unit(s) SubCutaneous at bedtime  insulin lispro (HumaLOG) corrective regimen sliding scale   SubCutaneous every 6 hours  lacosamide Solution 50 milliGRAM(s) Oral two times a day  levETIRAcetam  Solution 125 milliGRAM(s) Oral <User Schedule>  levETIRAcetam  Solution 250 milliGRAM(s) Oral two times a day  levothyroxine 50 MICROGram(s) Oral daily  melatonin 3 milliGRAM(s) Oral at bedtime  Nephro-macie 1 Tablet(s) Oral daily  pantoprazole Infusion 8 mG/Hr IV Continuous <Continuous>  polyethylene glycol 3350 17 Gram(s) Oral daily  senna 1 Tablet(s) Oral at bedtime  sevelamer carbonate Powder 800 milliGRAM(s) Oral three times a day with meals  traZODone 50 milliGRAM(s) Oral at bedtime      Allergies:  No Known Allergies      FAMILY HISTORY:      Social History:  Smoking History:  Alcohol Use:  Drug Use:    Review of Systems:  REVIEW OF SYSTEMS:  CONSTITUTIONAL: No weakness, fevers or chills  EYES/ENT: No visual changes;  No dysphagia  NECK: No pain or stiffness  RESPIRATORY: No cough, wheezing, hemoptysis; No shortness of breath  CARDIOVASCULAR: No chest pain or palpitations; No lower extremity edema  GASTROINTESTINAL: +dark stool  BACK: No back pain  GENITOURINARY: No dysuria, frequency or hematuria  NEUROLOGICAL: No numbness or weakness  SKIN: No itching, burning, rashes, or lesions   All other review of systems is negative unless indicated above.    Physical Exam:  T(F): 97.5 (02-21), Max: 98.8 (02-20)  HR: 67 (02-21) (65 - 76)  BP: 155/76 (02-21) (121/61 - 192/90)  RR: 19 (02-21)  SpO2: 98% (02-21)    GENERAL: No acute distress, well-developed  HEAD:  Atraumatic, Normocephalic  ENT: EOMI, PERRLA, conjunctiva and sclera clear, Neck supple, No JVD, moist mucosa  CHEST/LUNG: Clear to auscultation bilaterally; No wheeze, equal breath sounds bilaterally   BACK: No spinal tenderness  HEART: Regular rate and rhythm; No murmurs, rubs, or gallops  ABDOMEN: Soft, Nontender, Nondistended; Bowel sounds present  EXTREMITIES:  No clubbing, cyanosis, or edema  PSYCH: Nl behavior, nl affect  NEUROLOGY: AAOx3, non-focal, cranial nerves intact  SKIN: Normal color, No rashes or lesions  LINES:    Cardiovascular Diagnostic Testing:    ECG: Personally reviewed  No recent EKGs    Echo:  TTE 1/3/20  Conclusions:  1. Mitral annular calcification, otherwise normal mitral  valve. Mild mitral regurgitation.  2. Normal left ventricular internal dimensions and wall  thicknesses.  3. Endocardium not well visualized; grossly normal left  ventricular systolic function.  4. The right ventricle is not well visualized; grossly  normal right ventricular systolic function. TV s' = 11  cm/sec.  5. Estimated right ventricular systolic pressure equals 35  mm Hg, assuming right atrial pressure equals 8 mm Hg,  consistent with borderline pulmonary hypertension.  *** No previous Echo exam.  ------------------------------------------------------------------------  Confirmed on  1/3/2020 - 23:27:08 by Mp Guerrero M.D.  ------------------------------------------------------------------------    Cath:  VENTRICLES: Global left ventricular function was normal. EF estimated was  60 %.  CORONARY VESSELS: The coronary circulationis left dominant.  LM:   --  LM: Angiography showed minor luminal irregularities with no flow  limiting lesions.  LAD:   --  Proximal LAD: There was a 80 % stenosis.  CX:   --  Circumflex: Angiography showed moderate atherosclerosis.  --  OM1: There was a 0 % stenosis at the site of a prior stent.  RCA:   --  Proximal RCA: The vessel was very small sized. Angiography  showed severe atherosclerosis.  COMPLICATIONS: There were no complications.  SUMMARY:  CORONARY VESSELS: LM: Angiography showed minor luminal irregularities with  no flow limiting lesions. Proximal LAD: There was a 80 % stenosis.  Circumflex: Angiography showed moderate atherosclerosis. OM1: There was a  0 % stenosis at the site of a prior stent. Proximal RCA: The vessel was  very small sized. Angiography showed severe atherosclerosis.  CARDIAC STRUCTURES: Global left ventricular function was normal. EF  estimated was 60 %.  DIAGNOSTIC RECOMMENDATIONS: OM IFR normal.  LAD IFR 0.52  INTERVENTIONAL RECOMMENDATIONS: Aspirin andplavix. Transplant delayed.  Prepared and signed by  Berenice Bose M.D.          Interpretation of Telemetry:    Imaging:    Labs: Personally reviewed                        6.6    8.66  )-----------( 234      ( 21 Feb 2020 05:09 )             20.7     02-21    133<L>  |  96  |  58<H>  ----------------------------<  78  3.7   |  24  |  4.69<H>    Ca    8.6      21 Feb 2020 05:09  Phos  3.1     02-21  Mg     2.3     02-21    TPro  8.3  /  Alb  3.6  /  TBili  0.2  /  DBili  x   /  AST  53<H>  /  ALT  35  /  AlkPhos  132<H>  02-20    PT/INR - ( 20 Feb 2020 15:15 )   PT: 17.5 sec;   INR: 1.50 ratio         PTT - ( 21 Feb 2020 05:09 )  PTT:96.7 sec Patient seen and evaluated at bedside    Chief Complaint: Anemia     HPI: Patient is a 64 year old man with a PMHx of HTN, T2DM, afib, CAD s/p CARLA to ? 2009, CARLA to OM1 9/2018, CARLA to pLAD 6/2019 (on plavix/eliquis), carotid stenosis (R 50-69%, L w/ atheromatous disease but no elevated velocities), ESRD on HD (MWF), CVA (12/2019) s/p tpa, s/p PEG presenting from rehab facility w/ Hgb 6.6. He endorsed having a BM w/ dark stools on Saturday, 2/15, but none after that. His last BM was on the morning of admission, which was "normal" and dark brown in color. Patient has history of anemia and has received EPO in the past with HD, but per patient's son at the bedside, it has been quite some time since he has received it; receive 1 blood transfusion during his previous hospital admission. Had EGD approximately 2 years ago that showed gastritis, but was otherwise unremarkable.     Of note, pt was last hospitalized from 12/19 - 1/14 for status epilepticus s/p intubation and PEG in the setting of embolic bianchi. During that admission patient newly diagnosed with  afib for which he was started on eliquis. He was noted to have carotid stenosis and he was continued on plavix at discharge. From his stroke, he has residual slurring of speech, weakness of both legs, as well as weakness in his UE's, right > left. No sensory deficits.     In the ED, vitals Tmax 98.8, HR 71-77 /82 - 174/89 RR 16 SPO2 100% on RA. Notable labs: hgb 7.1, WBC 11.23. S/p protonix gtt and 1 U pRBC. Eliquis/plavix was held and pt was started on heparin gtt. Post transfusion hgb 8.1, now 6.1 this morning. Heparin gtt held.    This morning, HR 67, /76. Pt denies any cp, sob, dizziness. He has not had a BM since being in the hospital.    PMH:   CVA (cerebral vascular accident)  Hypothyroidism  CRI (Chronic Renal Insufficiency)  CAD (Coronary Artery Disease)  CAD (Coronary Artery Disease)  Dyslipidemia  Diabetes  Hypertension      PSH:   History of insertion of stent into coronary artery bypass graft  No significant past surgical history      Medications:   atorvastatin 80 milliGRAM(s) Oral at bedtime  carvedilol 3.125 milliGRAM(s) Oral every 12 hours  dextrose 40% Gel 15 Gram(s) Oral once PRN  dextrose 5%. 1000 milliLiter(s) IV Continuous <Continuous>  dextrose 50% Injectable 12.5 Gram(s) IV Push once  dextrose 50% Injectable 25 Gram(s) IV Push once  dextrose 50% Injectable 25 Gram(s) IV Push once  escitalopram 5 milliGRAM(s) Oral daily  glucagon  Injectable 1 milliGRAM(s) IntraMuscular once PRN  insulin glargine Injectable (LANTUS) 10 Unit(s) SubCutaneous at bedtime  insulin lispro (HumaLOG) corrective regimen sliding scale   SubCutaneous every 6 hours  lacosamide Solution 50 milliGRAM(s) Oral two times a day  levETIRAcetam  Solution 125 milliGRAM(s) Oral <User Schedule>  levETIRAcetam  Solution 250 milliGRAM(s) Oral two times a day  levothyroxine 50 MICROGram(s) Oral daily  melatonin 3 milliGRAM(s) Oral at bedtime  Nephro-macie 1 Tablet(s) Oral daily  pantoprazole Infusion 8 mG/Hr IV Continuous <Continuous>  polyethylene glycol 3350 17 Gram(s) Oral daily  senna 1 Tablet(s) Oral at bedtime  sevelamer carbonate Powder 800 milliGRAM(s) Oral three times a day with meals  traZODone 50 milliGRAM(s) Oral at bedtime      Allergies:  No Known Allergies    Social History:  Smoking History: Denies  Alcohol Use: Denies  Drug Use: Denies    Review of Systems:  REVIEW OF SYSTEMS:  CONSTITUTIONAL: No weakness, fevers or chills  EYES/ENT: No visual changes;  No dysphagia  NECK: No pain or stiffness  RESPIRATORY: No cough, wheezing, hemoptysis; No shortness of breath  CARDIOVASCULAR: No chest pain or palpitations; No lower extremity edema  GASTROINTESTINAL: +dark stool  BACK: No back pain  GENITOURINARY: No dysuria, frequency or hematuria  NEUROLOGICAL: No numbness or weakness  SKIN: No itching, burning, rashes, or lesions   All other review of systems is negative unless indicated above.    Physical Exam:  T(F): 97.5 (02-21), Max: 98.8 (02-20)  HR: 67 (02-21) (65 - 76)  BP: 155/76 (02-21) (121/61 - 192/90)  RR: 19 (02-21)  SpO2: 98% (02-21)    GENERAL: No acute distress, well-developed  HEAD:  Atraumatic, Normocephalic  ENT: EOMI, PERRLA, conjunctiva and sclera clear, Neck supple, No JVD, moist mucosa  CHEST/LUNG: Clear to auscultation bilaterally; No wheeze, equal breath sounds bilaterally   BACK: No spinal tenderness  HEART: Regular rate and rhythm; No murmurs, rubs, or gallops  ABDOMEN: Soft, Nontender, Nondistended; Bowel sounds present  EXTREMITIES:  No clubbing, cyanosis, or edema  PSYCH: Nl behavior, nl affect  NEUROLOGY: AAOx3, non-focal, cranial nerves intact  SKIN: Normal color, No rashes or lesions  LINES:    Cardiovascular Diagnostic Testing:    ECG:   No recent EKGs    Echo:  TTE 1/3/20  Conclusions:  1. Mitral annular calcification, otherwise normal mitral  valve. Mild mitral regurgitation.  2. Normal left ventricular internal dimensions and wall  thicknesses.  3. Endocardium not well visualized; grossly normal left  ventricular systolic function.  4. The right ventricle is not well visualized; grossly  normal right ventricular systolic function. TV s' = 11  cm/sec.  5. Estimated right ventricular systolic pressure equals 35  mm Hg, assuming right atrial pressure equals 8 mm Hg,  consistent with borderline pulmonary hypertension.  *** No previous Echo exam.  ------------------------------------------------------------------------  Confirmed on  1/3/2020 - 23:27:08 by Mp Guerrero M.D.  ------------------------------------------------------------------------    Cath:  VENTRICLES: Global left ventricular function was normal. EF estimated was  60 %.  CORONARY VESSELS: The coronary circulationis left dominant.  LM:   --  LM: Angiography showed minor luminal irregularities with no flow  limiting lesions.  LAD:   --  Proximal LAD: There was a 80 % stenosis.  CX:   --  Circumflex: Angiography showed moderate atherosclerosis.  --  OM1: There was a 0 % stenosis at the site of a prior stent.  RCA:   --  Proximal RCA: The vessel was very small sized. Angiography  showed severe atherosclerosis.  COMPLICATIONS: There were no complications.  SUMMARY:  CORONARY VESSELS: LM: Angiography showed minor luminal irregularities with  no flow limiting lesions. Proximal LAD: There was a 80 % stenosis.  Circumflex: Angiography showed moderate atherosclerosis. OM1: There was a  0 % stenosis at the site of a prior stent. Proximal RCA: The vessel was  very small sized. Angiography showed severe atherosclerosis.  CARDIAC STRUCTURES: Global left ventricular function was normal. EF  estimated was 60 %.  DIAGNOSTIC RECOMMENDATIONS: OM IFR normal.  LAD IFR 0.52    Labs: Personally reviewed                        6.6    8.66  )-----------( 234      ( 21 Feb 2020 05:09 )             20.7     02-21    133<L>  |  96  |  58<H>  ----------------------------<  78  3.7   |  24  |  4.69<H>    Ca    8.6      21 Feb 2020 05:09  Phos  3.1     02-21  Mg     2.3     02-21    TPro  8.3  /  Alb  3.6  /  TBili  0.2  /  DBili  x   /  AST  53<H>  /  ALT  35  /  AlkPhos  132<H>  02-20    PT/INR - ( 20 Feb 2020 15:15 )   PT: 17.5 sec;   INR: 1.50 ratio         PTT - ( 21 Feb 2020 05:09 )  PTT:96.7 sec Patient seen and evaluated at bedside    Chief Complaint: Anemia     HPI: Patient is a 64 year old man with a PMHx of HTN, T2DM, afib, CAD s/p CARLA to ? 2009, CARLA to OM1 9/2018, CARLA to pLAD 6/2019 (on plavix/eliquis), carotid stenosis (R 50-69%, L w/ atheromatous disease but no elevated velocities), ESRD on HD (MWF), CVA (12/2019) s/p tpa, s/p PEG presenting from rehab facility w/ Hgb 6.6. He endorsed having a BM w/ dark stools on Saturday, 2/15, but none after that. His last BM was on the morning of admission, which was "normal" and dark brown in color. Patient has history of anemia and has received EPO in the past with HD, but per patient's son at the bedside, it has been quite some time since he has received it; receive 1 blood transfusion during his previous hospital admission. Had EGD approximately 2 years ago that showed gastritis, but was otherwise unremarkable.     Of note, pt was last hospitalized from 12/19 - 1/14 for status epilepticus s/p intubation and PEG in the setting of embolic bianchi. During that admission patient newly diagnosed with  afib for which he was started on eliquis. He was noted to have carotid stenosis and he was continued on plavix at discharge. From his stroke, he has residual slurring of speech, weakness of both legs, as well as weakness in his UE's, right > left. No sensory deficits.     In the ED, vitals Tmax 98.8, HR 71-77 /82 - 174/89 RR 16 SPO2 100% on RA. Notable labs: hgb 7.1, WBC 11.23. S/p protonix gtt and 1 U pRBC. Eliquis/plavix was held and pt was started on heparin gtt. Post transfusion hgb 8.1, now 6.1 this morning. Heparin gtt held.    This morning, HR 67, /76. Pt denies any cp, sob, dizziness. He has not had a BM since being in the hospital.    PMH:   CVA (cerebral vascular accident)  Hypothyroidism  CRI (Chronic Renal Insufficiency)  CAD (Coronary Artery Disease)  CAD (Coronary Artery Disease)  Dyslipidemia  Diabetes  Hypertension    PSH:   History of insertion of stent into coronary artery bypass graft  No significant past surgical history    Medications:   atorvastatin 80 milliGRAM(s) Oral at bedtime  carvedilol 3.125 milliGRAM(s) Oral every 12 hours  dextrose 40% Gel 15 Gram(s) Oral once PRN  dextrose 5%. 1000 milliLiter(s) IV Continuous <Continuous>  dextrose 50% Injectable 12.5 Gram(s) IV Push once  dextrose 50% Injectable 25 Gram(s) IV Push once  dextrose 50% Injectable 25 Gram(s) IV Push once  escitalopram 5 milliGRAM(s) Oral daily  glucagon  Injectable 1 milliGRAM(s) IntraMuscular once PRN  insulin glargine Injectable (LANTUS) 10 Unit(s) SubCutaneous at bedtime  insulin lispro (HumaLOG) corrective regimen sliding scale   SubCutaneous every 6 hours  lacosamide Solution 50 milliGRAM(s) Oral two times a day  levETIRAcetam  Solution 125 milliGRAM(s) Oral <User Schedule>  levETIRAcetam  Solution 250 milliGRAM(s) Oral two times a day  levothyroxine 50 MICROGram(s) Oral daily  melatonin 3 milliGRAM(s) Oral at bedtime  Nephro-macie 1 Tablet(s) Oral daily  pantoprazole Infusion 8 mG/Hr IV Continuous <Continuous>  polyethylene glycol 3350 17 Gram(s) Oral daily  senna 1 Tablet(s) Oral at bedtime  sevelamer carbonate Powder 800 milliGRAM(s) Oral three times a day with meals  traZODone 50 milliGRAM(s) Oral at bedtime    Allergies:  No Known Allergies    Social History:  Smoking History: Denies  Alcohol Use: Denies  Drug Use: Denies    Review of Systems:  REVIEW OF SYSTEMS:  CONSTITUTIONAL: No weakness, fevers or chills  EYES/ENT: No visual changes;  No dysphagia  NECK: No pain or stiffness  RESPIRATORY: No cough, wheezing, hemoptysis; No shortness of breath  CARDIOVASCULAR: No chest pain or palpitations; No lower extremity edema  GASTROINTESTINAL: +dark stool  BACK: No back pain  GENITOURINARY: No dysuria, frequency or hematuria  NEUROLOGICAL: No numbness or weakness  SKIN: No itching, burning, rashes, or lesions   All other review of systems is negative unless indicated above.    Physical Exam:  T(F): 97.5 (02-21), Max: 98.8 (02-20)  HR: 67 (02-21) (65 - 76)  BP: 155/76 (02-21) (121/61 - 192/90)  RR: 19 (02-21)  SpO2: 98% (02-21)    GENERAL: No acute distress, well-developed  HEAD:  Atraumatic, Normocephalic  ENT: EOMI, PERRLA, conjunctiva and sclera clear, Neck supple, No JVD, moist mucosa  CHEST/LUNG: Clear to auscultation bilaterally; No wheeze, equal breath sounds bilaterally   BACK: No spinal tenderness  HEART: Regular rate and rhythm; No murmurs, rubs, or gallops  ABDOMEN: Soft, Nontender, Nondistended; Bowel sounds present  EXTREMITIES:  No clubbing, cyanosis, or edema  PSYCH: Nl behavior, nl affect  NEUROLOGY: AAOx3, non-focal, cranial nerves intact  SKIN: Normal color, No rashes or lesions    Cardiovascular Diagnostic Testing:    ECG:   No recent EKGs    Echo:  TTE 1/3/20  Conclusions:  1. Mitral annular calcification, otherwise normal mitral  valve. Mild mitral regurgitation.  2. Normal left ventricular internal dimensions and wall  thicknesses.  3. Endocardium not well visualized; grossly normal left  ventricular systolic function.  4. The right ventricle is not well visualized; grossly  normal right ventricular systolic function. TV s' = 11  cm/sec.  5. Estimated right ventricular systolic pressure equals 35  mm Hg, assuming right atrial pressure equals 8 mm Hg,  consistent with borderline pulmonary hypertension.  *** No previous Echo exam.  ------------------------------------------------------------------------  Confirmed on  1/3/2020 - 23:27:08 by Mp Guerrero M.D.  ------------------------------------------------------------------------    Cath:  VENTRICLES: Global left ventricular function was normal. EF estimated was  60 %.  CORONARY VESSELS: The coronary circulationis left dominant.  LM:   --  LM: Angiography showed minor luminal irregularities with no flow  limiting lesions.  LAD:   --  Proximal LAD: There was a 80 % stenosis.  CX:   --  Circumflex: Angiography showed moderate atherosclerosis.  --  OM1: There was a 0 % stenosis at the site of a prior stent.  RCA:   --  Proximal RCA: The vessel was very small sized. Angiography  showed severe atherosclerosis.  COMPLICATIONS: There were no complications.  SUMMARY:  CORONARY VESSELS: LM: Angiography showed minor luminal irregularities with  no flow limiting lesions. Proximal LAD: There was a 80 % stenosis.  Circumflex: Angiography showed moderate atherosclerosis. OM1: There was a  0 % stenosis at the site of a prior stent. Proximal RCA: The vessel was  very small sized. Angiography showed severe atherosclerosis.  CARDIAC STRUCTURES: Global left ventricular function was normal. EF  estimated was 60 %.  DIAGNOSTIC RECOMMENDATIONS: OM IFR normal.  LAD IFR 0.52    Labs: Personally reviewed                        6.6    8.66  )-----------( 234      ( 21 Feb 2020 05:09 )             20.7     02-21    133<L>  |  96  |  58<H>  ----------------------------<  78  3.7   |  24  |  4.69<H>    Ca    8.6      21 Feb 2020 05:09  Phos  3.1     02-21  Mg     2.3     02-21    TPro  8.3  /  Alb  3.6  /  TBili  0.2  /  DBili  x   /  AST  53<H>  /  ALT  35  /  AlkPhos  132<H>  02-20    PT/INR - ( 20 Feb 2020 15:15 )   PT: 17.5 sec;   INR: 1.50 ratio       PTT - ( 21 Feb 2020 05:09 )  PTT:96.7 sec

## 2020-02-21 NOTE — DISCHARGE NOTE PROVIDER - NSDCFUSCHEDAPPT_GEN_ALL_CORE_FT
CHAD DUNBAR ; 05/26/2020 ; [facility] [address] [place] [address]  CHAD DUNBAR ; 05/26/2020 ; [facility] [address] [place] [address]  CHAD DUNBAR ; 05/26/2020 ; [facility] [address] [place] [address]

## 2020-02-21 NOTE — PROGRESS NOTE ADULT - PROBLEM SELECTOR PLAN 3
History of atrial fibrillation with [id number] of 6. Was taking Eliquis at home for AC  - will start heparin gtt  - monitor PTT and CBC q6h History of atrial fibrillation with [id number] of 6. Was taking Eliquis at home for AC  - heparin gtt discontinued in setting of hgb drop fro, 8.1 to 6.6 this AM  - monitor PTT and CBC q6h

## 2020-02-21 NOTE — PROVIDER CONTACT NOTE (OTHER) - ACTION/TREATMENT ORDERED:
NP aware. No BP medication at this time, if SBP over 180 will consider hydralazine; as per NP follow Keppra orders and reschedule 250mg for later time; Start Heparin and Protonix IV; draw STAT CBC. MD aware. No BP medication at this time, if SBP over 180 will consider hydralazine; as per NP follow Keppra orders and reschedule 250mg for later time; Start Heparin and Protonix IV; draw STAT CBC.

## 2020-02-21 NOTE — PHYSICAL THERAPY INITIAL EVALUATION ADULT - MANUAL MUSCLE TESTING RESULTS, REHAB EVAL
grossly assessed due to LUE/LE 5/5, RLE DF 4-/5, PF 3-/5, R knee extension 4-/5, RUE grossly 3+/5/grossly assessed due to

## 2020-02-21 NOTE — PROGRESS NOTE ADULT - PROBLEM SELECTOR PLAN 4
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

## 2020-02-21 NOTE — DIETITIAN INITIAL EVALUATION ADULT. - PERTINENT LABORATORY DATA
02-21 Na133 mmol/L<L> Glu 78 mg/dL K+ 3.7 mmol/L Cr  4.69 mg/dL<H> BUN 58 mg/dL<H> Phos 3.1 mg/dL  finger sticks:  mg/dL  A1c 6.0% (2/21)

## 2020-02-21 NOTE — PROGRESS NOTE ADULT - PROBLEM SELECTOR PLAN 8
CAD  - S/p stents in 2009 and 6/2019.   - will hold Plavix  - c/w atorvastatin 80mg once daily    Hypothyroidism  - c/w Levothyroxine 50mcg once daily

## 2020-02-21 NOTE — DIETITIAN INITIAL EVALUATION ADULT. - PROBLEM SELECTOR PLAN 3
History of atrial fibrillation with [id number] of 6. Was taking Eliquis at home for AC  - will start heparin gtt  - monitor PTT and CBC q6h

## 2020-02-21 NOTE — DIETITIAN INITIAL EVALUATION ADULT. - OTHER INFO
Per chart,  63 y/o male presents from Rehab facility, admitted for GI w/u for anemia and melena on Saturday likely secondary to GIB.  PMH: HTN, T2DM, CAD, carotid stenosis, ESRD on HD (MW), CVA (12/2019) c/b dysphagia s/p PEG    Information obtained from: EMR, pt, pt's son, rehab transfer records    Intake PTA: Per rehab records and pt's son, pt was tolerating a Dysphagia 2 diet with thin liquids, with supplemental EN feeding nightly of Nepro starting at 8pm, for a total of     Confirms NKFA, no nausea/vomiting, no difficulty chewing/swallowing, no GI distress, no micronutrient supplementation PTA, last BM ___.    Diet:    Education:    Weight Hx: Per chart,  63 y/o male presents from Rehab facility, admitted for GI w/u for anemia and melena on Saturday likely secondary to GIB.  PMH: HTN, T2DM, CAD, carotid stenosis, ESRD on HD (MWF), CVA (12/2019) c/b dysphagia s/p PEG    Information obtained from: EMR, pt, pt's son, rehab transfer records    Intake PTA: Per rehab records and pt's son, pt was tolerating a Dysphagia 2 diet with thin liquids, with supplemental EN feeding nightly of Nepro starting at 8pm, for a total of 800 mL volume. Pt was tolerating foods like sandwiches, pasta, cut up chicken. Pt's son stated the RD at the rehab facility planned to decrease supplemental EN feeding by 400mL each week as PO intake increased. PO intake is gradually improving per pt's sonn.    Confirms NKFA, pt does not take beef or pork, no nausea/vomiting, some difficulty chewing with missing teeth on one side of mouth, no GI distress, no micronutrient supplementation PTA, last BM 2/20, pt typically has BM every other day.    Diet: Pt NPO currently. Per team, pt may go for scope today and diet will be advanced as appropriate. Pt enjoys Nepro supplement and would like to receive in-house when diet is advanced.    Weight Hx: Pt's son states pt lost 10 pounds post-CVA in December-January, however has regained this weight. UBW for pt is ~152 pounds. Per sunrise, pt was 154 pounds (1/4) when discharged. Noted dosing wt of 161 pounds this admission, pt's weights often fluctuate due to HD.

## 2020-02-21 NOTE — OCCUPATIONAL THERAPY INITIAL EVALUATION ADULT - BALANCE TRAINING, PT EVAL
Goal: Patient will increase static/dynamic standing balance by 1/2 grade to facilitate increased safety, ability to perform ADLs and functional mobility within 4 weeks.

## 2020-02-21 NOTE — DISCHARGE NOTE PROVIDER - NSDCCPCAREPLAN_GEN_ALL_CORE_FT
PRINCIPAL DISCHARGE DIAGNOSIS  Diagnosis: GIB (gastrointestinal bleeding)  Assessment and Plan of Treatment: You were admitted for concerns over a GI bleed and as a result, your Plavix and Eliquis were held on admission. Your hemoglobin on admission to the emergency department was low and you subsequently received a blood transfusion. The gastroenterology team took part in your care and recommended a conservative approach and we opted to monitor your hemoglobin and bowel movements for signs of bleeding.      SECONDARY DISCHARGE DIAGNOSES  Diagnosis: Atrial fibrillation  Assessment and Plan of Treatment: You have a history of an abnormal heart rhythm, called atrial fibrillation, which leaves you at a high risk for a stroke. On admission, we held your Eliquis due to concerns for a GI bleed. During your hospitalization, you were seen by our cardiology team, who guided us in the management of your heart medications.    Diagnosis: Anemia  Assessment and Plan of Treatment: You have a history of anemia, likely due to chronic kidney disease, but when you arrived to the emergency department, your hemoglobin was lower than it normally would be. You received several blood transfusions during this hospitalization and the gastroenterologists were consulted in your care due to concerns for a GI bleed that was causing your hemoglobin to be low.

## 2020-02-21 NOTE — PROVIDER CONTACT NOTE (OTHER) - RECOMMENDATIONS
As per Heparin Nomogram keep Heparin drip at the same rate of 13. Hold Heparin due to possible active bleed?

## 2020-02-21 NOTE — PROGRESS NOTE ADULT - SUBJECTIVE AND OBJECTIVE BOX
Neptali Salas MD  Internal Medicine, PGY-1  Beeper: [phone] ([facility])/ [id number] ([facility])     Subjective:    Patient is a 64y old  Male who presents with a chief complaint of Anemia, concern for GIB (20 Feb 2020 17:52)    Patient was seen and examined at bedside this AM. No acute overnight events. Denied fever, chills, nausea, vomiting, CP, palpitations, coughing, wheezing, SOB, and abdominal pain.      MEDICATIONS  (STANDING):  atorvastatin 80 milliGRAM(s) Oral at bedtime  carvedilol 3.125 milliGRAM(s) Oral every 12 hours  dextrose 5%. 1000 milliLiter(s) (50 mL/Hr) IV Continuous <Continuous>  dextrose 50% Injectable 12.5 Gram(s) IV Push once  dextrose 50% Injectable 25 Gram(s) IV Push once  dextrose 50% Injectable 25 Gram(s) IV Push once  escitalopram 5 milliGRAM(s) Oral daily  heparin  Infusion.  Unit(s)/Hr (13 mL/Hr) IV Continuous <Continuous>  insulin glargine Injectable (LANTUS) 10 Unit(s) SubCutaneous at bedtime  insulin lispro (HumaLOG) corrective regimen sliding scale   SubCutaneous every 6 hours  lacosamide Solution 50 milliGRAM(s) Oral two times a day  levETIRAcetam  Solution 125 milliGRAM(s) Oral <User Schedule>  levETIRAcetam  Solution 250 milliGRAM(s) Oral two times a day  levothyroxine 50 MICROGram(s) Oral daily  melatonin 3 milliGRAM(s) Oral at bedtime  Nephro-macie 1 Tablet(s) Oral daily  pantoprazole Infusion 8 mG/Hr (10 mL/Hr) IV Continuous <Continuous>  polyethylene glycol 3350 17 Gram(s) Oral daily  senna 1 Tablet(s) Oral at bedtime  sevelamer carbonate Powder 800 milliGRAM(s) Oral three times a day with meals  traZODone 50 milliGRAM(s) Oral at bedtime    MEDICATIONS  (PRN):  dextrose 40% Gel 15 Gram(s) Oral once PRN Blood Glucose LESS THAN 70 milliGRAM(s)/deciliter  glucagon  Injectable 1 milliGRAM(s) IntraMuscular once PRN Glucose LESS THAN 70 milligrams/deciliter  heparin  Injectable 6000 Unit(s) IV Push every 6 hours PRN For aPTT less than 40  heparin  Injectable 3000 Unit(s) IV Push every 6 hours PRN For aPTT between 40 - 57      Objective:    Vitals: Vital Signs Last 24 Hrs  T(C): 36.4 (02-21-20 @ 06:43), Max: 37.1 (02-20-20 @ 13:11)  T(F): 97.5 (02-21-20 @ 06:43), Max: 98.8 (02-20-20 @ 13:11)  HR: 67 (02-21-20 @ 06:43) (65 - 76)  BP: 155/76 (02-21-20 @ 06:43) (121/61 - 192/90)  BP(mean): --  RR: 19 (02-21-20 @ 06:43) (16 - 19)  SpO2: 98% (02-21-20 @ 06:43) (98% - 100%)              I&O's Summary    20 Feb 2020 07:01  -  21 Feb 2020 07:00  --------------------------------------------------------  IN: 627 mL / OUT: 500 mL / NET: 127 mL        PHYSICAL EXAM:  GENERAL: NAD, well-groomed, well-developed  HEAD:  Atraumatic, Normocephalic  EYES: EOMI, PERRLA, conjunctiva and sclera clear  ENMT: No tonsillar erythema, exudates, or enlargement; Moist mucous membranes, Good dentition, No lesions  NECK: Supple, No JVD, Normal thyroid  CHEST/LUNG: Clear to auscultation bilaterally; No rales, rhonchi, wheezing, or rubs  HEART: Regular rate and rhythm; No murmurs, rubs, or gallops  ABDOMEN: Soft, Nontender, Nondistended; Bowel sounds present  EXTREMITIES:  2+ Peripheral Pulses, No clubbing, cyanosis, or edema  LYMPH: No lymphadenopathy noted  SKIN: No rashes or lesions  NERVOUS SYSTEM:  Alert & Oriented X3, Good concentration; Motor Strength 5/5 B/L upper and lower extremities; DTRs 2+ intact and symmetric                                             LABS:  02-21    133<L>  |  96  |  58<H>  ----------------------------<  78  3.7   |  24  |  4.69<H>  02-20    128<L>  |  90<L>  |  49<H>  ----------------------------<  78  4.4   |  24  |  3.88<H>    Ca    8.6      21 Feb 2020 05:09  Ca    9.2      20 Feb 2020 14:15  Phos  3.1     02-21  Mg     2.3     02-21    TPro  8.3  /  Alb  3.6  /  TBili  0.2  /  DBili  x   /  AST  53<H>  /  ALT  35  /  AlkPhos  132<H>  02-20      PT/INR - ( 20 Feb 2020 15:15 )   PT: 17.5 sec;   INR: 1.50 ratio         PTT - ( 21 Feb 2020 05:09 )  PTT:96.7 sec                                              6.6    8.66  )-----------( 234      ( 21 Feb 2020 05:09 )             20.7                         8.1    10.05 )-----------( 277      ( 20 Feb 2020 22:29 )             26.0                         7.1    11.23 )-----------( 289      ( 20 Feb 2020 14:15 )             22.8     CAPILLARY BLOOD GLUCOSE      POCT Blood Glucose.: 75 mg/dL (21 Feb 2020 07:07)  POCT Blood Glucose.: 77 mg/dL (21 Feb 2020 06:30)  POCT Blood Glucose.: 117 mg/dL (20 Feb 2020 23:20)        RECENT CULTURES:      TELEMETRY:    ECG:    TTE:    RADIOLOGY & ADDITIONAL TESTS:    Imaging Personally Reviewed:  [ ] YES  [ ] NO    Consultants involved in case:   Consultant(s) Notes Reviewed:  [ ] YES  [ ] NO:   Care Discussed with Consultants/Other Providers [ ] YES  [ ] NO Neptali Salas MD  Internal Medicine, PGY-1  Beeper: [phone] ([facility])/ [phone] ([facility])     Subjective:    Patient is a 64y old  Male who presents with a chief complaint of Anemia, concern for GIB (20 Feb 2020 17:52)    Patient was seen and examined at bedside this AM. No acute overnight events. No complaints. No BM overnight; patient was instructed to inform staff if he has a BM. Denied dizziness, lightheadedness, fever, chills, nausea, vomiting, CP, palpitations, coughing, wheezing, SOB, and abdominal pain.      MEDICATIONS  (STANDING):  atorvastatin 80 milliGRAM(s) Oral at bedtime  carvedilol 3.125 milliGRAM(s) Oral every 12 hours  dextrose 5%. 1000 milliLiter(s) (50 mL/Hr) IV Continuous <Continuous>  dextrose 50% Injectable 12.5 Gram(s) IV Push once  dextrose 50% Injectable 25 Gram(s) IV Push once  dextrose 50% Injectable 25 Gram(s) IV Push once  escitalopram 5 milliGRAM(s) Oral daily  heparin  Infusion.  Unit(s)/Hr (13 mL/Hr) IV Continuous <Continuous>  insulin glargine Injectable (LANTUS) 10 Unit(s) SubCutaneous at bedtime  insulin lispro (HumaLOG) corrective regimen sliding scale   SubCutaneous every 6 hours  lacosamide Solution 50 milliGRAM(s) Oral two times a day  levETIRAcetam  Solution 125 milliGRAM(s) Oral <User Schedule>  levETIRAcetam  Solution 250 milliGRAM(s) Oral two times a day  levothyroxine 50 MICROGram(s) Oral daily  melatonin 3 milliGRAM(s) Oral at bedtime  Nephro-macie 1 Tablet(s) Oral daily  pantoprazole Infusion 8 mG/Hr (10 mL/Hr) IV Continuous <Continuous>  polyethylene glycol 3350 17 Gram(s) Oral daily  senna 1 Tablet(s) Oral at bedtime  sevelamer carbonate Powder 800 milliGRAM(s) Oral three times a day with meals  traZODone 50 milliGRAM(s) Oral at bedtime    MEDICATIONS  (PRN):  dextrose 40% Gel 15 Gram(s) Oral once PRN Blood Glucose LESS THAN 70 milliGRAM(s)/deciliter  glucagon  Injectable 1 milliGRAM(s) IntraMuscular once PRN Glucose LESS THAN 70 milligrams/deciliter  heparin  Injectable 6000 Unit(s) IV Push every 6 hours PRN For aPTT less than 40  heparin  Injectable 3000 Unit(s) IV Push every 6 hours PRN For aPTT between 40 - 57      Objective:    Vitals: Vital Signs Last 24 Hrs  T(C): 36.4 (02-21-20 @ 06:43), Max: 37.1 (02-20-20 @ 13:11)  T(F): 97.5 (02-21-20 @ 06:43), Max: 98.8 (02-20-20 @ 13:11)  HR: 67 (02-21-20 @ 06:43) (65 - 76)  BP: 155/76 (02-21-20 @ 06:43) (121/61 - 192/90)  RR: 19 (02-21-20 @ 06:43) (16 - 19)  SpO2: 98% (02-21-20 @ 06:43) (98% - 100%)                I&O's Summary  20 Feb 2020 07:01  -  21 Feb 2020 07:00  --------------------------------------------------------  IN: 627 mL / OUT: 500 mL / NET: 127 mL      PHYSICAL EXAM:  GENERAL: Elderly male sitting comfortably in his chair in NAD; receiving blood transfusion during time of encounter  HEAD:  Atraumatic, Normocephalic  EYES: EOMI, conjunctiva and sclera clear  CHEST/LUNG: b/l crackles, poor air flow  HEART: Regular rate and rhythm; No murmurs, rubs, or gallops  ABDOMEN: Soft, Nontender, Nondistended; Bowel sounds present  EXTREMITIES: trace LE edema  SKIN: No rashes or lesions noted  NERVOUS SYSTEM:  Alert & Oriented X3, Good concentration                                   LABS:  02-21    133<L>  |  96  |  58<H>  ----------------------------<  78  3.7   |  24  |  4.69<H>    Ca    8.6      21 Feb 2020 05:09  Phos  3.1     02-21  Mg     2.3     02-21    PTT - ( 21 Feb 2020 05:09 )  PTT:96.7 sec                                      6.6    8.66  )-----------( 234      ( 21 Feb 2020 05:09 )             20.7                        CAPILLARY BLOOD GLUCOSE  POCT Blood Glucose.: 75 mg/dL (21 Feb 2020 07:07)  POCT Blood Glucose.: 77 mg/dL (21 Feb 2020 06:30)  POCT Blood Glucose.: 117 mg/dL (20 Feb 2020 23:20) Neptali Salas MD  Internal Medicine, PGY-1  Beeper: [phone] ([facility])/ [phone] ([facility])     Subjective:    Patient is a 64y old  Male who presents with a chief complaint of Anemia, concern for GIB (20 Feb 2020 17:52)    Patient was seen and examined at bedside this AM. No acute overnight events. No complaints. No BM overnight; patient was instructed to inform staff if he has a BM. Denied dizziness, lightheadedness, fever, chills, nausea, vomiting, CP, palpitations, coughing, wheezing, SOB, and abdominal pain.      MEDICATIONS  (STANDING):  atorvastatin 80 milliGRAM(s) Oral at bedtime  carvedilol 3.125 milliGRAM(s) Oral every 12 hours  dextrose 5%. 1000 milliLiter(s) (50 mL/Hr) IV Continuous <Continuous>  dextrose 50% Injectable 12.5 Gram(s) IV Push once  dextrose 50% Injectable 25 Gram(s) IV Push once  dextrose 50% Injectable 25 Gram(s) IV Push once  escitalopram 5 milliGRAM(s) Oral daily  heparin  Infusion.  Unit(s)/Hr (13 mL/Hr) IV Continuous <Continuous>  insulin glargine Injectable (LANTUS) 10 Unit(s) SubCutaneous at bedtime  insulin lispro (HumaLOG) corrective regimen sliding scale   SubCutaneous every 6 hours  lacosamide Solution 50 milliGRAM(s) Oral two times a day  levETIRAcetam  Solution 125 milliGRAM(s) Oral <User Schedule>  levETIRAcetam  Solution 250 milliGRAM(s) Oral two times a day  levothyroxine 50 MICROGram(s) Oral daily  melatonin 3 milliGRAM(s) Oral at bedtime  Nephro-macie 1 Tablet(s) Oral daily  pantoprazole Infusion 8 mG/Hr (10 mL/Hr) IV Continuous <Continuous>  polyethylene glycol 3350 17 Gram(s) Oral daily  senna 1 Tablet(s) Oral at bedtime  sevelamer carbonate Powder 800 milliGRAM(s) Oral three times a day with meals  traZODone 50 milliGRAM(s) Oral at bedtime    MEDICATIONS  (PRN):  dextrose 40% Gel 15 Gram(s) Oral once PRN Blood Glucose LESS THAN 70 milliGRAM(s)/deciliter  glucagon  Injectable 1 milliGRAM(s) IntraMuscular once PRN Glucose LESS THAN 70 milligrams/deciliter  heparin  Injectable 6000 Unit(s) IV Push every 6 hours PRN For aPTT less than 40  heparin  Injectable 3000 Unit(s) IV Push every 6 hours PRN For aPTT between 40 - 57      Objective:    Vitals: Vital Signs Last 24 Hrs  T(C): 36.4 (02-21-20 @ 06:43), Max: 37.1 (02-20-20 @ 13:11)  T(F): 97.5 (02-21-20 @ 06:43), Max: 98.8 (02-20-20 @ 13:11)  HR: 67 (02-21-20 @ 06:43) (65 - 76)  BP: 155/76 (02-21-20 @ 06:43) (121/61 - 192/90)  RR: 19 (02-21-20 @ 06:43) (16 - 19)  SpO2: 98% (02-21-20 @ 06:43) (98% - 100%)                I&O's Summary  20 Feb 2020 07:01  -  21 Feb 2020 07:00  --------------------------------------------------------  IN: 627 mL / OUT: 500 mL / NET: 127 mL      PHYSICAL EXAM:  GENERAL: Elderly male sitting comfortably in his chair in NAD; receiving blood transfusion during time of encounter  HEAD:  Atraumatic, Normocephalic  EYES: EOMI, conjunctiva and sclera clear  CHEST/LUNG: b/l crackles, poor air flow  HEART: Regular rate and rhythm; No murmurs, rubs, or gallops  ABDOMEN: Soft, Nontender, Nondistended; Bowel sounds present  EXTREMITIES: trace LE edema  SKIN: No rashes or lesions noted  NERVOUS SYSTEM:  Alert & Oriented X3, Good concentration                                   LABS:  02-21    133<L>  |  96  |  58<H>  ----------------------------<  78  3.7   |  24  |  4.69<H>    Ca    8.6      21 Feb 2020 05:09  Phos  3.1     02-21  Mg     2.3     02-21    PTT - ( 21 Feb 2020 05:09 )  PTT:96.7 sec                                      6.6    8.66  )-----------( 234      ( 21 Feb 2020 05:09 )             20.7                        CAPILLARY BLOOD GLUCOSE  POCT Blood Glucose.: 75 mg/dL (21 Feb 2020 07:07)  POCT Blood Glucose.: 77 mg/dL (21 Feb 2020 06:30)  POCT Blood Glucose.: 117 mg/dL (20 Feb 2020 23:20)    Plan of care discussed withfollowing consultants: GI, cardiology

## 2020-02-21 NOTE — PHYSICAL THERAPY INITIAL EVALUATION ADULT - IMPAIRED TRANSFERS: SIT/STAND, REHAB EVAL
decreased strength/impaired balance/cognition/impaired postural control/impaired motor control/impaired coordination

## 2020-02-21 NOTE — OCCUPATIONAL THERAPY INITIAL EVALUATION ADULT - DIAGNOSIS, OT EVAL
Pt currently presents with decreased endurance, balance, motor control, coordination, dexterity, manipulation, sensation and strength limiting independence with ADLs and functional mobility.

## 2020-02-21 NOTE — OCCUPATIONAL THERAPY INITIAL EVALUATION ADULT - ADL RETRAINING, OT EVAL
Goal: Pt will perform UB/LB dressing independently using compensatory dressing technique and appropriate DME within 4 weeks. Goal: Patient will perform grooming standing sink level independently in 4 weeks

## 2020-02-21 NOTE — DIETITIAN INITIAL EVALUATION ADULT. - ADD RECOMMEND
1) Please place pt on PO diet and EN regimen as seen above, with Nepro x1 daily. 2) Monitor PO intake. As pt's PO intake increases and pt is able to meet increased nutrient demand PO, EN regimen may be decreased. 3) RD remains available as needed.

## 2020-02-21 NOTE — OCCUPATIONAL THERAPY INITIAL EVALUATION ADULT - PERTINENT HX OF CURRENT PROBLEM, REHAB EVAL
65yo M w/ PMH HTN, T2DM, CAD s/p stent 2009, 2019, carotid stenosis, ESRD on HD (MWF), CVA (12/2019) s/p tpa (on plavix) c/b dysphagia s/p PEG, a. fib (on eliquis) who presents from rehab facility w/ a hgb level of 6.6. He endorsed having a BM w/ dark stools on Saturday,presents with Anemia, concern for GIB.

## 2020-02-21 NOTE — DIETITIAN INITIAL EVALUATION ADULT. - PHYSICAL APPEARANCE
other (specify)/well nourished/Nutrition-focused physical exam not warranted at this time. Ht: 70 inches (per prior RD note) Wt: 161 pounds dosing wt BMI: 23.1 kg/m2 IBW: 166 pounds(+/-10%) 97%IBW  no edema. no pressure ulcers documented.

## 2020-02-21 NOTE — DISCHARGE NOTE PROVIDER - CARE PROVIDER_API CALL
Emil Barrientos)  Internal Medicine; Nephrology  [address], [address] [place] [address]  Phone: [phone]  Fax: [phone]  Follow Up Time:     Jimenez Witt (DO)  Gastroenterology; Internal Medicine  [address] [place] [address]  Phone: [phone]  Fax: [phone]  Follow Up Time:     Jose Maria Moise)  Internal Medicine  [address] [place] [address]  Phone: [phone]  Fax: [phone]  Follow Up Time: Emil Barrientos)  Internal Medicine; Nephrology  [address], [address]nd [place] [address]  Phone: [phone]  Fax: [phone]  Follow Up Time: 1 week    Jimenez Witt (DO)  Gastroenterology; Internal Medicine  [address] [place] [address]  Phone: [phone]  Fax: [phone]  Follow Up Time: 1 week    Jose Maria Moise)  Internal Medicine  [facility], [address] [place] [address]  Phone: [phone]  Fax: [phone]  Follow Up Time: 1 week

## 2020-02-21 NOTE — PHYSICAL THERAPY INITIAL EVALUATION ADULT - CRITERIA FOR SKILLED THERAPEUTIC INTERVENTIONS
risk reduction/prevention/rehab potential/functional limitations in following categories/therapy frequency/impairments found/anticipated discharge recommendation/predicted duration of therapy intervention

## 2020-02-21 NOTE — PROVIDER CONTACT NOTE (OTHER) - SITUATION
pt arrived from ED, no CBC drawn post 1unit PRBC; clarification on Keppra orders; Heparin and Protonix not hung at time ordered; Pt BP upon arrival elevated

## 2020-02-21 NOTE — PROVIDER CONTACT NOTE (OTHER) - ACTION/TREATMENT ORDERED:
MD aware. Follow Heparin Nomogram as ordered. Continue Heparin drip at this time. will continue to monitor. pt safety maintained

## 2020-02-22 LAB
ALBUMIN SERPL ELPH-MCNC: 3 G/DL — LOW (ref 3.3–5)
ALP SERPL-CCNC: 109 U/L — SIGNIFICANT CHANGE UP (ref 40–120)
ALT FLD-CCNC: 21 U/L — SIGNIFICANT CHANGE UP (ref 10–45)
ANION GAP SERPL CALC-SCNC: 11 MMOL/L — SIGNIFICANT CHANGE UP (ref 5–17)
AST SERPL-CCNC: 16 U/L — SIGNIFICANT CHANGE UP (ref 10–40)
BILIRUB SERPL-MCNC: 0.3 MG/DL — SIGNIFICANT CHANGE UP (ref 0.2–1.2)
BUN SERPL-MCNC: 31 MG/DL — HIGH (ref 7–23)
CALCIUM SERPL-MCNC: 8.4 MG/DL — SIGNIFICANT CHANGE UP (ref 8.4–10.5)
CHLORIDE SERPL-SCNC: 95 MMOL/L — LOW (ref 96–108)
CO2 SERPL-SCNC: 26 MMOL/L — SIGNIFICANT CHANGE UP (ref 22–31)
CREAT SERPL-MCNC: 2.98 MG/DL — HIGH (ref 0.5–1.3)
GLUCOSE BLDC GLUCOMTR-MCNC: 125 MG/DL — HIGH (ref 70–99)
GLUCOSE BLDC GLUCOMTR-MCNC: 165 MG/DL — HIGH (ref 70–99)
GLUCOSE BLDC GLUCOMTR-MCNC: 165 MG/DL — HIGH (ref 70–99)
GLUCOSE BLDC GLUCOMTR-MCNC: 189 MG/DL — HIGH (ref 70–99)
GLUCOSE BLDC GLUCOMTR-MCNC: 190 MG/DL — HIGH (ref 70–99)
GLUCOSE BLDC GLUCOMTR-MCNC: 222 MG/DL — HIGH (ref 70–99)
GLUCOSE SERPL-MCNC: 178 MG/DL — HIGH (ref 70–99)
HCT VFR BLD CALC: 24.9 % — LOW (ref 39–50)
HCT VFR BLD CALC: 28.6 % — LOW (ref 39–50)
HGB BLD-MCNC: 8.2 G/DL — LOW (ref 13–17)
HGB BLD-MCNC: 8.9 G/DL — LOW (ref 13–17)
MAGNESIUM SERPL-MCNC: 1.9 MG/DL — SIGNIFICANT CHANGE UP (ref 1.6–2.6)
MCHC RBC-ENTMCNC: 29.6 PG — SIGNIFICANT CHANGE UP (ref 27–34)
MCHC RBC-ENTMCNC: 30.4 PG — SIGNIFICANT CHANGE UP (ref 27–34)
MCHC RBC-ENTMCNC: 31.1 GM/DL — LOW (ref 32–36)
MCHC RBC-ENTMCNC: 32.9 GM/DL — SIGNIFICANT CHANGE UP (ref 32–36)
MCV RBC AUTO: 92.2 FL — SIGNIFICANT CHANGE UP (ref 80–100)
MCV RBC AUTO: 95 FL — SIGNIFICANT CHANGE UP (ref 80–100)
NRBC # BLD: 0 /100 WBCS — SIGNIFICANT CHANGE UP (ref 0–0)
NRBC # BLD: 0 /100 WBCS — SIGNIFICANT CHANGE UP (ref 0–0)
PHOSPHATE SERPL-MCNC: 1.6 MG/DL — LOW (ref 2.5–4.5)
PLATELET # BLD AUTO: 276 K/UL — SIGNIFICANT CHANGE UP (ref 150–400)
PLATELET # BLD AUTO: 289 K/UL — SIGNIFICANT CHANGE UP (ref 150–400)
POTASSIUM SERPL-MCNC: 3.6 MMOL/L — SIGNIFICANT CHANGE UP (ref 3.5–5.3)
POTASSIUM SERPL-SCNC: 3.6 MMOL/L — SIGNIFICANT CHANGE UP (ref 3.5–5.3)
PROT SERPL-MCNC: 6.8 G/DL — SIGNIFICANT CHANGE UP (ref 6–8.3)
RBC # BLD: 2.7 M/UL — LOW (ref 4.2–5.8)
RBC # BLD: 3.01 M/UL — LOW (ref 4.2–5.8)
RBC # FLD: 17.4 % — HIGH (ref 10.3–14.5)
RBC # FLD: 17.5 % — HIGH (ref 10.3–14.5)
SODIUM SERPL-SCNC: 132 MMOL/L — LOW (ref 135–145)
WBC # BLD: 10.24 K/UL — SIGNIFICANT CHANGE UP (ref 3.8–10.5)
WBC # BLD: 8.76 K/UL — SIGNIFICANT CHANGE UP (ref 3.8–10.5)
WBC # FLD AUTO: 10.24 K/UL — SIGNIFICANT CHANGE UP (ref 3.8–10.5)
WBC # FLD AUTO: 8.76 K/UL — SIGNIFICANT CHANGE UP (ref 3.8–10.5)

## 2020-02-22 PROCEDURE — 99233 SBSQ HOSP IP/OBS HIGH 50: CPT | Mod: GC

## 2020-02-22 RX ORDER — CARVEDILOL PHOSPHATE 80 MG/1
6.25 CAPSULE, EXTENDED RELEASE ORAL EVERY 12 HOURS
Refills: 0 | Status: DISCONTINUED | OUTPATIENT
Start: 2020-02-22 | End: 2020-02-26

## 2020-02-22 RX ORDER — HEPARIN SODIUM 5000 [USP'U]/ML
6000 INJECTION INTRAVENOUS; SUBCUTANEOUS EVERY 6 HOURS
Refills: 0 | Status: DISCONTINUED | OUTPATIENT
Start: 2020-02-22 | End: 2020-02-23

## 2020-02-22 RX ORDER — HEPARIN SODIUM 5000 [USP'U]/ML
3000 INJECTION INTRAVENOUS; SUBCUTANEOUS EVERY 6 HOURS
Refills: 0 | Status: DISCONTINUED | OUTPATIENT
Start: 2020-02-22 | End: 2020-02-23

## 2020-02-22 RX ORDER — HEPARIN SODIUM 5000 [USP'U]/ML
6000 INJECTION INTRAVENOUS; SUBCUTANEOUS ONCE
Refills: 0 | Status: COMPLETED | OUTPATIENT
Start: 2020-02-22 | End: 2020-02-22

## 2020-02-22 RX ORDER — HEPARIN SODIUM 5000 [USP'U]/ML
INJECTION INTRAVENOUS; SUBCUTANEOUS
Qty: 25000 | Refills: 0 | Status: DISCONTINUED | OUTPATIENT
Start: 2020-02-22 | End: 2020-02-23

## 2020-02-22 RX ORDER — LABETALOL HCL 100 MG
10 TABLET ORAL ONCE
Refills: 0 | Status: COMPLETED | OUTPATIENT
Start: 2020-02-22 | End: 2020-02-22

## 2020-02-22 RX ADMIN — HEPARIN SODIUM 1300 UNIT(S)/HR: 5000 INJECTION INTRAVENOUS; SUBCUTANEOUS at 18:33

## 2020-02-22 RX ADMIN — ATORVASTATIN CALCIUM 80 MILLIGRAM(S): 80 TABLET, FILM COATED ORAL at 21:44

## 2020-02-22 RX ADMIN — POLYETHYLENE GLYCOL 3350 17 GRAM(S): 17 POWDER, FOR SOLUTION ORAL at 12:41

## 2020-02-22 RX ADMIN — PANTOPRAZOLE SODIUM 10 MG/HR: 20 TABLET, DELAYED RELEASE ORAL at 05:28

## 2020-02-22 RX ADMIN — Medication 50 MILLIGRAM(S): at 21:44

## 2020-02-22 RX ADMIN — Medication 50 MICROGRAM(S): at 05:27

## 2020-02-22 RX ADMIN — ESCITALOPRAM OXALATE 5 MILLIGRAM(S): 10 TABLET, FILM COATED ORAL at 12:41

## 2020-02-22 RX ADMIN — INSULIN GLARGINE 10 UNIT(S): 100 INJECTION, SOLUTION SUBCUTANEOUS at 02:56

## 2020-02-22 RX ADMIN — Medication 100 MILLIGRAM(S): at 16:27

## 2020-02-22 RX ADMIN — LACOSAMIDE 50 MILLIGRAM(S): 50 TABLET ORAL at 18:42

## 2020-02-22 RX ADMIN — PANTOPRAZOLE SODIUM 10 MG/HR: 20 TABLET, DELAYED RELEASE ORAL at 12:43

## 2020-02-22 RX ADMIN — CARVEDILOL PHOSPHATE 6.25 MILLIGRAM(S): 80 CAPSULE, EXTENDED RELEASE ORAL at 18:42

## 2020-02-22 RX ADMIN — LEVETIRACETAM 250 MILLIGRAM(S): 250 TABLET, FILM COATED ORAL at 18:36

## 2020-02-22 RX ADMIN — Medication 3 MILLIGRAM(S): at 21:44

## 2020-02-22 RX ADMIN — SEVELAMER CARBONATE 800 MILLIGRAM(S): 2400 POWDER, FOR SUSPENSION ORAL at 05:27

## 2020-02-22 RX ADMIN — Medication 1: at 06:22

## 2020-02-22 RX ADMIN — SENNA PLUS 1 TABLET(S): 8.6 TABLET ORAL at 21:44

## 2020-02-22 RX ADMIN — HEPARIN SODIUM 6000 UNIT(S): 5000 INJECTION INTRAVENOUS; SUBCUTANEOUS at 18:34

## 2020-02-22 RX ADMIN — CARVEDILOL PHOSPHATE 3.12 MILLIGRAM(S): 80 CAPSULE, EXTENDED RELEASE ORAL at 05:28

## 2020-02-22 RX ADMIN — Medication 10 MILLIGRAM(S): at 16:27

## 2020-02-22 RX ADMIN — Medication 1: at 12:40

## 2020-02-22 RX ADMIN — Medication 1 TABLET(S): at 14:23

## 2020-02-22 RX ADMIN — INSULIN GLARGINE 10 UNIT(S): 100 INJECTION, SOLUTION SUBCUTANEOUS at 23:06

## 2020-02-22 RX ADMIN — LACOSAMIDE 50 MILLIGRAM(S): 50 TABLET ORAL at 05:27

## 2020-02-22 RX ADMIN — LEVETIRACETAM 250 MILLIGRAM(S): 250 TABLET, FILM COATED ORAL at 05:26

## 2020-02-22 NOTE — PROGRESS NOTE ADULT - ASSESSMENT
Patient is a 65 yo M w/ PMH HTN, T2DM, CAD s/p stent 2009, 2019, carotid stenosis, ESRD on HD (MWF), CVA (12/2019) s/p tpa (on plavix) c/b dysphagia s/p PEG, Atrial fibrillation (on eliquis) who presents from rehab facility w/ a hgb level of 6.6. He endorsed having a BM w/ dark stools on Saturday, 2/15 but has resolved as per son.     UGIB   Acute blood loss anemia    - patient reportedly had melena x 1 last Saturday. As per son, melena has resolved, patient has been having normal Bms throughout the week. Last bm was yesterday and reportedly normal.   - s/p 2u prbc   - recently had EGD with PEG placement 12/27. EGD demonstrated some non-bleeding gastric ulcers  - a/c on hold  - await repeat cbc today  - if hgb remains stable, plan to restart heparin infusion this evening  - continue PPI infusion  - please maintain active type and screen  - monitor cbc, transfuse prn  - monitor vitals  - monitor stool color  - continue bowel regimen   - discussed with Dr. Centeno and Hospitalist  - will follow

## 2020-02-22 NOTE — PROGRESS NOTE ADULT - PROBLEM SELECTOR PLAN 3
History of atrial fibrillation with [id number] of 6. Was taking Eliquis at home for AC  - heparin gtt discontinued in setting of hgb drop fro, 8.1 to 6.6 this AM  - monitor PTT and CBC q6h History of atrial fibrillation with [id number] of 6. Was taking Eliquis at home for AC  - heparin gtt discontinued in setting of hgb drop fro, 8.1 to 6.6. AC as above

## 2020-02-22 NOTE — PROGRESS NOTE ADULT - PROBLEM SELECTOR PLAN 6
ESRD 2/2 diabetes; HD started in 6/2019. Currently awaiting kidney transplant; is on transplant list. MWF schedule.  - nephrology following, HD tomorrow  - c/w Midodrine 5mg prior to HD  - f/u PTH in AM  - continue to monitor daily BMP, mg, and phos ESRD 2/2 diabetes; HD started in 6/2019. Currently awaiting kidney transplant; is on transplant list. MWF schedule.  - nephrology following, HD MWF  - c/w Midodrine 5mg prior to HD  - iPTH elevated at 93  - continue to monitor daily BMP, mg, and phos

## 2020-02-22 NOTE — PROGRESS NOTE ADULT - PROBLEM SELECTOR PLAN 9
DVT prophylaxis: holding AC in the setting of GIB  GI: protonix gtt  Diet: Dysphagia 2 diet with thin liquids with nepro supplement once daily; from 8PM to 8AM nepro @ 70cc/hr

## 2020-02-22 NOTE — SWALLOW BEDSIDE ASSESSMENT ADULT - SWALLOW EVAL: PATIENT/FAMILY GOALS STATEMENT
Pt's son reported history of dysphagia, pt underwent 2 barium swallow studies. The first one was completed soon after pt's stroke which resulted in recommendations for NPO, with non-oral nutrition/hydration/medications. The second exam was completed about 2 weeks ago (? at [facility]). Per son, recommendations from most recent exam were to initiate PO diet (soft solids with thin liquids). Son reported that RD's plan at rehab was for PO intake along with tube feeds and to slowly reduce tube feeds with goal for PEG removal. Son reported new onset cough (since admit) however denied observing coughing or difficulty with PO intake.

## 2020-02-22 NOTE — PROGRESS NOTE ADULT - PROBLEM SELECTOR PLAN 1
P/w hgb 7.1 (baseline 8-9) in setting of melena on Saturday 2/15. Had "normal" BM this AM; CBC from HD yesterday was 6.6. Likely 2/2 Upper GI bleed; differential dx in AVM vs. PUD.  - nonbleeding ulcers on previous EGD from December  - s/p 1 PRBC 2/20 and 1 unit 2/21  - c/w protonix gtt  - maintain active type and screen, 2 large IV bores   - GI following, appreciate recs.   - will hold eliquis and plavix. Will start heparin gtt given [id number] of 6  - CBCs q6h P/w hgb 7.1 (baseline 8-9) in setting of melena on Saturday 2/15. Had "normal" BM this AM; CBC from HD yesterday was 6.6. Likely 2/2 Upper GI bleed; differential dx in AVM vs. PUD.  - nonbleeding ulcers on previous EGD from December  - s/p 1 PRBC 2/20 and 1 unit 2/21  - c/w protonix gtt  - will hold eliquis and plavix. Hgb stable for 24 hours. If stable this afternoon, plan to restart heparin gtt. If hgb remains stable tomorrow morning, will consider restarting Plavix.  - maintain active type and screen, 2 large IV bores   - GI following, appreciate recs.  - CBCs q6h

## 2020-02-22 NOTE — PROGRESS NOTE ADULT - SUBJECTIVE AND OBJECTIVE BOX
Neptali Salas MD  Internal Medicine, PGY-1  Beeper: [phone] ([facility])/ [phone] ([facility])     Subjective:    Patient is a 64y old  Male who presents with a chief complaint of Anemia, concern for GIB (21 Feb 2020 15:18)    Patient was seen and examined at bedside this AM. No acute overnight events. Denied fever, chills, nausea, vomiting, CP, palpitations, coughing, wheezing, SOB, and abdominal pain.      MEDICATIONS  (STANDING):  atorvastatin 80 milliGRAM(s) Oral at bedtime  carvedilol 3.125 milliGRAM(s) Oral every 12 hours  dextrose 5%. 1000 milliLiter(s) (50 mL/Hr) IV Continuous <Continuous>  dextrose 50% Injectable 12.5 Gram(s) IV Push once  dextrose 50% Injectable 25 Gram(s) IV Push once  dextrose 50% Injectable 25 Gram(s) IV Push once  escitalopram 5 milliGRAM(s) Oral daily  insulin glargine Injectable (LANTUS) 10 Unit(s) SubCutaneous at bedtime  insulin lispro (HumaLOG) corrective regimen sliding scale   SubCutaneous every 6 hours  lacosamide Solution 50 milliGRAM(s) Oral two times a day  levETIRAcetam  Solution 125 milliGRAM(s) Oral <User Schedule>  levETIRAcetam  Solution 250 milliGRAM(s) Oral two times a day  levothyroxine 50 MICROGram(s) Oral daily  melatonin 3 milliGRAM(s) Oral at bedtime  Nephro-macie 1 Tablet(s) Oral daily  pantoprazole Infusion 8 mG/Hr (10 mL/Hr) IV Continuous <Continuous>  polyethylene glycol 3350 17 Gram(s) Oral daily  senna 1 Tablet(s) Oral at bedtime  sevelamer carbonate Powder 800 milliGRAM(s) Oral three times a day with meals  traZODone 50 milliGRAM(s) Oral at bedtime    MEDICATIONS  (PRN):  dextrose 40% Gel 15 Gram(s) Oral once PRN Blood Glucose LESS THAN 70 milliGRAM(s)/deciliter  glucagon  Injectable 1 milliGRAM(s) IntraMuscular once PRN Glucose LESS THAN 70 milligrams/deciliter      Objective:    Vitals: Vital Signs Last 24 Hrs  T(C): 36.8 (02-22-20 @ 04:33), Max: 37.1 (02-21-20 @ 17:19)  T(F): 98.3 (02-22-20 @ 04:33), Max: 98.8 (02-21-20 @ 17:19)  HR: 76 (02-22-20 @ 04:33) (54 - 83)  BP: 173/66 (02-22-20 @ 04:33) (115/67 - 178/78)  BP(mean): --  RR: 18 (02-22-20 @ 04:33) (17 - 20)  SpO2: 97% (02-22-20 @ 04:33) (97% - 100%)              I&O's Summary    21 Feb 2020 07:01  -  22 Feb 2020 07:00  --------------------------------------------------------  IN: 2260 mL / OUT: 2901 mL / NET: -641 mL        PHYSICAL EXAM:  GENERAL: NAD, well-groomed, well-developed  HEAD:  Atraumatic, Normocephalic  EYES: EOMI, PERRLA, conjunctiva and sclera clear  ENMT: No tonsillar erythema, exudates, or enlargement; Moist mucous membranes, Good dentition, No lesions  NECK: Supple, No JVD, Normal thyroid  CHEST/LUNG: Clear to auscultation bilaterally; No rales, rhonchi, wheezing, or rubs  HEART: Regular rate and rhythm; No murmurs, rubs, or gallops  ABDOMEN: Soft, Nontender, Nondistended; Bowel sounds present  EXTREMITIES:  2+ Peripheral Pulses, No clubbing, cyanosis, or edema  LYMPH: No lymphadenopathy noted  SKIN: No rashes or lesions  NERVOUS SYSTEM:  Alert & Oriented X3, Good concentration; Motor Strength 5/5 B/L upper and lower extremities; DTRs 2+ intact and symmetric                                             LABS:  02-22    132<L>  |  95<L>  |  31<H>  ----------------------------<  178<H>  3.6   |  26  |  2.98<H>  02-21    133<L>  |  96  |  58<H>  ----------------------------<  78  3.7   |  24  |  4.69<H>  02-20    128<L>  |  90<L>  |  49<H>  ----------------------------<  78  4.4   |  24  |  3.88<H>    Ca    8.4      22 Feb 2020 04:45  Ca    8.6      21 Feb 2020 05:09  Ca    9.2      20 Feb 2020 14:15  Phos  1.6     02-22  Mg     1.9     02-22    TPro  6.8  /  Alb  3.0<L>  /  TBili  0.3  /  DBili  x   /  AST  16  /  ALT  21  /  AlkPhos  109  02-22  TPro  8.3  /  Alb  3.6  /  TBili  0.2  /  DBili  x   /  AST  53<H>  /  ALT  35  /  AlkPhos  132<H>  02-20      PT/INR - ( 20 Feb 2020 15:15 )   PT: 17.5 sec;   INR: 1.50 ratio         PTT - ( 21 Feb 2020 15:27 )  PTT:32.9 sec                                              8.2    10.24 )-----------( 276      ( 22 Feb 2020 04:45 )             24.9                         7.9    7.79  )-----------( 236      ( 21 Feb 2020 19:35 )             24.5                         8.1    7.97  )-----------( 230      ( 21 Feb 2020 12:11 )             26.6     CAPILLARY BLOOD GLUCOSE      POCT Blood Glucose.: 190 mg/dL (22 Feb 2020 06:38)  POCT Blood Glucose.: 189 mg/dL (22 Feb 2020 06:16)  POCT Blood Glucose.: 125 mg/dL (22 Feb 2020 02:17)  POCT Blood Glucose.: 138 mg/dL (21 Feb 2020 17:27)  POCT Blood Glucose.: 87 mg/dL (21 Feb 2020 12:02)        RECENT CULTURES:      TELEMETRY:    ECG:    TTE:    RADIOLOGY & ADDITIONAL TESTS:    Imaging Personally Reviewed:  [ ] YES  [ ] NO    Consultants involved in case:   Consultant(s) Notes Reviewed:  [ ] YES  [ ] NO:   Care Discussed with Consultants/Other Providers [ ] YES  [ ] NO Neptali Salas MD  Internal Medicine, PGY-1  Beeper: [phone] ([facility])/ [phone] ([facility])     Subjective:    Patient is a 64y old  Male who presents with a chief complaint of Anemia, concern for GIB (21 Feb 2020 15:18)    Patient was seen and examined at bedside this AM. No acute overnight events. Had BM last night that tested FOBT (+). Patient states he is fine; reported no complaints. He denied fever, chills, nausea, vomiting, CP, palpitations, coughing, wheezing, SOB, and abdominal pain.      MEDICATIONS  (STANDING):  atorvastatin 80 milliGRAM(s) Oral at bedtime  carvedilol 3.125 milliGRAM(s) Oral every 12 hours  dextrose 5%. 1000 milliLiter(s) (50 mL/Hr) IV Continuous <Continuous>  dextrose 50% Injectable 12.5 Gram(s) IV Push once  dextrose 50% Injectable 25 Gram(s) IV Push once  dextrose 50% Injectable 25 Gram(s) IV Push once  escitalopram 5 milliGRAM(s) Oral daily  insulin glargine Injectable (LANTUS) 10 Unit(s) SubCutaneous at bedtime  insulin lispro (HumaLOG) corrective regimen sliding scale   SubCutaneous every 6 hours  lacosamide Solution 50 milliGRAM(s) Oral two times a day  levETIRAcetam  Solution 125 milliGRAM(s) Oral <User Schedule>  levETIRAcetam  Solution 250 milliGRAM(s) Oral two times a day  levothyroxine 50 MICROGram(s) Oral daily  melatonin 3 milliGRAM(s) Oral at bedtime  Nephro-macie 1 Tablet(s) Oral daily  pantoprazole Infusion 8 mG/Hr (10 mL/Hr) IV Continuous <Continuous>  polyethylene glycol 3350 17 Gram(s) Oral daily  senna 1 Tablet(s) Oral at bedtime  sevelamer carbonate Powder 800 milliGRAM(s) Oral three times a day with meals  traZODone 50 milliGRAM(s) Oral at bedtime    MEDICATIONS  (PRN):  dextrose 40% Gel 15 Gram(s) Oral once PRN Blood Glucose LESS THAN 70 milliGRAM(s)/deciliter  glucagon  Injectable 1 milliGRAM(s) IntraMuscular once PRN Glucose LESS THAN 70 milligrams/deciliter      Objective:    Vitals: Vital Signs Last 24 Hrs  T(C): 36.8 (02-22-20 @ 04:33), Max: 37.1 (02-21-20 @ 17:19)  T(F): 98.3 (02-22-20 @ 04:33), Max: 98.8 (02-21-20 @ 17:19)  HR: 76 (02-22-20 @ 04:33) (54 - 83)  BP: 173/66 (02-22-20 @ 04:33) (115/67 - 178/78)  RR: 18 (02-22-20 @ 04:33) (17 - 20)  SpO2: 97% (02-22-20 @ 04:33) (97% - 100%)                I&O's Summary  21 Feb 2020 07:01  -  22 Feb 2020 07:00  --------------------------------------------------------  IN: 2260 mL / OUT: 2901 mL / NET: -641 mL      PHYSICAL EXAM:  GENERAL: Elderly male sitting comfortably in his chair eating breakfast, in NAD  HEAD:  Atraumatic, Normocephalic  EYES: EOMI, conjunctiva and sclera clear  CHEST/LUNG: fine LLL crackles, decreased breath sounds in RLL  HEART: Regular rate and rhythm; No murmurs, rubs, or gallops  ABDOMEN: Soft, Nontender, Nondistended; Bowel sounds present  EXTREMITIES: mild LLE edema  SKIN: No rashes or lesions noted  NERVOUS SYSTEM:  Alert & Oriented X3, Good concentration                                           LABS:  02-22    132<L>  |  95<L>  |  31<H>  ----------------------------<  178<H>  3.6   |  26  |  2.98<H>    Ca    8.4      22 Feb 2020 04:45  Phos  1.6     02-22  Mg     1.9     02-22    TPro  6.8  /  Alb  3.0<L>  /  TBili  0.3  /  DBili  x   /  AST  16  /  ALT  21  /  AlkPhos  109  02-22                          8.2    10.24 )-----------( 276      ( 22 Feb 2020 04:45 )             24.9               CAPILLARY BLOOD GLUCOSE  POCT Blood Glucose.: 190 mg/dL (22 Feb 2020 06:38)  POCT Blood Glucose.: 189 mg/dL (22 Feb 2020 06:16)  POCT Blood Glucose.: 125 mg/dL (22 Feb 2020 02:17)  POCT Blood Glucose.: 138 mg/dL (21 Feb 2020 17:27)  POCT Blood Glucose.: 87 mg/dL (21 Feb 2020 12:02)

## 2020-02-22 NOTE — PROGRESS NOTE ADULT - PROBLEM SELECTOR PLAN 2
Likely 2/2 upper GIB. Presenting to ED with hgb 7.1, was 6.6 yesterday at HD. EGD in the past showing gastritis; currently not taking PPI or H2-blocker at rehab.  - per renal, transfuse hgb <7.5. S/p 2 unit PRBC, as above  - c/w protonix gtt  - maintain active type and screen, 2 large IV bores  - will hold anticoagulation (eliquis and plavix) for now Likely 2/2 upper GIB. Presenting to ED with hgb 7.1, was 6.6 yesterday at HD. EGD in the past showing gastritis; currently not taking PPI or H2-blocker at rehab.  - per renal, transfuse hgb <7.5. S/p 2 unit PRBC, as above  - c/w protonix gtt  - maintain active type and screen, 2 large IV bores  - plan as above

## 2020-02-22 NOTE — PROGRESS NOTE ADULT - PROBLEM SELECTOR PLAN 10
Transitions of Care Status:  1.  Name of PCP: Dr. Morejon  2.  PCP Contacted on Admission: [ ] Y    [x] N; cardiology team is following    3.  PCP contacted at Discharge: [ ] Y    [ ] N    [ ] N/A  4.  Post-Discharge Appointment Date and Location:  5.  Summary of Handoff given to PCP:

## 2020-02-22 NOTE — PROGRESS NOTE ADULT - SUBJECTIVE AND OBJECTIVE BOX
[facility]   MD Matheus Gary MD, D.O. Ruoru Wong, PA    From 7 AM - 5 PM:  OFFICE: [phone]  Dr. Barrientos cell: [phone]  Dr. Rey cell: [phone]  Dr. Witt cell: [phone]  WALDO Valentin cell: [phone]    From 5 PM - 7 AM: Answering Service: [phone]      RENAL FOLLOW UP NOTE  --------------------------------------------------------------------------------  HPI: Pt seen and examined at bedside.   Denies SOB, chest pain or LE edema     PAST HISTORY  --------------------------------------------------------------------------------  No significant changes to PMH, PSH, FHx, SHx, unless otherwise noted    ALLERGIES & MEDICATIONS  --------------------------------------------------------------------------------  Allergies    No Known Allergies    Intolerances      Standing Inpatient Medications  atorvastatin 80 milliGRAM(s) Oral at bedtime  carvedilol 3.125 milliGRAM(s) Oral every 12 hours  dextrose 5%. 1000 milliLiter(s) IV Continuous <Continuous>  dextrose 50% Injectable 12.5 Gram(s) IV Push once  dextrose 50% Injectable 25 Gram(s) IV Push once  dextrose 50% Injectable 25 Gram(s) IV Push once  escitalopram 5 milliGRAM(s) Oral daily  insulin glargine Injectable (LANTUS) 10 Unit(s) SubCutaneous at bedtime  insulin lispro (HumaLOG) corrective regimen sliding scale   SubCutaneous every 6 hours  lacosamide Solution 50 milliGRAM(s) Oral two times a day  levETIRAcetam  Solution 125 milliGRAM(s) Oral <User Schedule>  levETIRAcetam  Solution 250 milliGRAM(s) Oral two times a day  levothyroxine 50 MICROGram(s) Oral daily  melatonin 3 milliGRAM(s) Oral at bedtime  Nephro-macie 1 Tablet(s) Oral daily  pantoprazole Infusion 8 mG/Hr IV Continuous <Continuous>  polyethylene glycol 3350 17 Gram(s) Oral daily  senna 1 Tablet(s) Oral at bedtime  sevelamer carbonate Powder 800 milliGRAM(s) Oral three times a day with meals  traZODone 50 milliGRAM(s) Oral at bedtime    PRN Inpatient Medications  dextrose 40% Gel 15 Gram(s) Oral once PRN  glucagon  Injectable 1 milliGRAM(s) IntraMuscular once PRN      REVIEW OF SYSTEMS  --------------------------------------------------------------------------------  General: no fever  CVS: no chest pain  RESP: no sob, no cough  ABD: no abdominal pain  : no dysuria  MSK: no edema     VITALS/PHYSICAL EXAM  --------------------------------------------------------------------------------  T(C): 36.2 (02-22-20 @ 08:47), Max: 37.1 (02-21-20 @ 17:19)  HR: 78 (02-22-20 @ 08:47) (54 - 83)  BP: 173/79 (02-22-20 @ 08:47) (115/67 - 178/78)  RR: 18 (02-22-20 @ 08:47) (17 - 20)  SpO2: 99% (02-22-20 @ 08:47) (97% - 100%)  Wt(kg): --    Weight (kg): 73.3 (02-20-20 @ 21:24)      02-21-20 @ 07:01  -  02-22-20 @ 07:00  --------------------------------------------------------  IN: 2260 mL / OUT: 2901 mL / NET: -641 mL    02-22-20 @ 07:01  -  02-22-20 @ 10:26  --------------------------------------------------------  IN: 180 mL / OUT: 0 mL / NET: 180 mL      Physical Exam:  	Gen: NAD  	HEENT: MMM  	Pulm: CTA B/L  	CV: S1S2  	Abd: Soft, +BS  	Ext: No LE edema B/L                      Neuro: Awake   	Skin: Warm and Dry   	Vascular access: + tunneled HD catheter     LABS/STUDIES  --------------------------------------------------------------------------------              8.2    10.24 >-----------<  276      [02-22-20 @ 04:45]              24.9     132  |  95  |  31  ----------------------------<  178      [02-22-20 @ 04:45]  3.6   |  26  |  2.98        Ca     8.4     [02-22-20 @ 04:45]      Mg     1.9     [02-22-20 @ 04:45]      Phos  1.6     [02-22-20 @ 04:45]    TPro  6.8  /  Alb  3.0  /  TBili  0.3  /  DBili  x   /  AST  16  /  ALT  21  /  AlkPhos  109  [02-22-20 @ 04:45]    PT/INR: PT 17.5 , INR 1.50       [02-20-20 @ 15:15]  PTT: 32.9       [02-21-20 @ 15:27]      Creatinine Trend:  SCr 2.98 [02-22 @ 04:45]  SCr 4.69 [02-21 @ 05:09]  SCr 3.88 [02-20 @ 14:15]      Iron 45, TIBC 145, %sat 31      [12-26-19 @ 23:24]  Ferritin 1369      [12-26-19 @ 23:24]  PTH -- (Ca 8.4)      [02-21-20 @ 08:54]   93  PTH -- (Ca 6.9)      [06-05-19 @ 08:14]   562  HbA1c 6.0      [02-21-20 @ 08:53]  TSH 6.22      [01-03-20 @ 10:03]  Lipid: chol 134, , HDL 56, LDL 52      [12-15-19 @ 10:56]

## 2020-02-22 NOTE — PROGRESS NOTE ADULT - SUBJECTIVE AND OBJECTIVE BOX
GASTROENTEROLOGY    Patient seen and examined this morning  Awake, alert, responsive  No acute overnight events noted  Maroon stools last evening per RN report  FOBT positive         MEDICATIONS  (STANDING):  atorvastatin 80 milliGRAM(s) Oral at bedtime  carvedilol 3.125 milliGRAM(s) Oral every 12 hours  dextrose 5%. 1000 milliLiter(s) (50 mL/Hr) IV Continuous <Continuous>  dextrose 50% Injectable 12.5 Gram(s) IV Push once  dextrose 50% Injectable 25 Gram(s) IV Push once  dextrose 50% Injectable 25 Gram(s) IV Push once  escitalopram 5 milliGRAM(s) Oral daily  insulin glargine Injectable (LANTUS) 10 Unit(s) SubCutaneous at bedtime  insulin lispro (HumaLOG) corrective regimen sliding scale   SubCutaneous every 6 hours  lacosamide Solution 50 milliGRAM(s) Oral two times a day  levETIRAcetam  Solution 125 milliGRAM(s) Oral <User Schedule>  levETIRAcetam  Solution 250 milliGRAM(s) Oral two times a day  levothyroxine 50 MICROGram(s) Oral daily  melatonin 3 milliGRAM(s) Oral at bedtime  Nephro-macie 1 Tablet(s) Oral daily  pantoprazole Infusion 8 mG/Hr (10 mL/Hr) IV Continuous <Continuous>  polyethylene glycol 3350 17 Gram(s) Oral daily  senna 1 Tablet(s) Oral at bedtime  traZODone 50 milliGRAM(s) Oral at bedtime        T(F): 97.2 (02-22-20 @ 08:47), Max: 98.8 (02-21-20 @ 17:19)  HR: 78 (02-22-20 @ 08:47) (54 - 83)  BP: 173/79 (02-22-20 @ 08:47) (115/67 - 178/78)  RR: 18 (02-22-20 @ 08:47) (17 - 20)  SpO2: 99% (02-22-20 @ 08:47) (97% - 100%)  Wt(kg): --    PHYSICAL EXAM  GENERAL:   NAD  HEENT:  NC/AT, no JVD, sclera-anicteric  CHEST:  clear to ascultation bilaterally, respirations nonlabored  HEART:  +S1+S2   ABDOMEN:  Soft, non-tender, non-distended, + bowel sounds, + peg   EXTREMITIES:  no cyanosis, clubbing or edema  NEURO:  Alert, responsive  SKIN:  No rash/warm/dry      LABS:                        8.2<L>  10.24 )-----------( 276      ( 22 Feb 2020 04:45 )             24.9<L>  22 Feb 2020 04:45    02-22    132<L>  |  95<L>  |  31<H>  ----------------------------<  178<H>  3.6   |  26  |  2.98<H>    Ca    8.4      22 Feb 2020 04:45  Phos  1.6     02-22  Mg     1.9     02-22    TPro  6.8  /  Alb  3.0<L>  /  TBili  0.3  /  DBili  x   /  AST  16  /  ALT  21  /  AlkPhos  109  02-22    LIVER FUNCTIONS - ( 22 Feb 2020 04:45 )  Alb: 3.0 g/dL / Pro: 6.8 g/dL / ALK PHOS: 109 U/L / ALT: 21 U/L / AST: 16 U/L / GGT: x           PT/INR - ( 20 Feb 2020 15:15 )   PT: 17.5 sec;   INR: 1.50 ratio         PTT - ( 21 Feb 2020 15:27 )  PTT:32.9 sec  I&O's Detail    21 Feb 2020 07:01  -  22 Feb 2020 07:00  --------------------------------------------------------  IN:    Oral Fluid: 260 mL    Other: 800 mL    pantoprazole Infusion: 1200 mL  Total IN: 2260 mL    OUT:    Other: 2800 mL    Stool: 1 mL    Voided: 100 mL  Total OUT: 2901 mL    Total NET: -641 mL      22 Feb 2020 07:01  -  22 Feb 2020 11:35  --------------------------------------------------------  IN:    Oral Fluid: 180 mL  Total IN: 180 mL    OUT:  Total OUT: 0 mL    Total NET: 180 mL

## 2020-02-22 NOTE — PROGRESS NOTE ADULT - ASSESSMENT
64 year old man with a history of CAD s/p stents 2009 and 2019, HTN, DM, Carotid stenosis, Hypothyroidism, and ESRD on HD, recently admitted for multiple intracerebral infarcts and is s/p TPA (now on ASA/Plavix) w/ hospital stay c/b afib (now on Eliquis) and status epilepticus requiring intubation, pt now extubated and w/ PEG tube in process of transitioning to whole foods, presenting from rehab w/ low hgb.     ESRD on HD via tunnelled HD cathter   on MWF at rehab  Pt tolerated HD friday with 2L removed  Labs reviewed. Pt clinically stable today   Plan for HD Monday  Consent obtained from family   Monitor BMP and BP    Anemia  Hb low but improved  s/p pRBCs  Not on IGOR sec to recent CVA  Monitor Hb    HTN  BP elevated  Advise titrate up coreg today if okay with cardiology   Monitor BP    CKD-BMD  PTH low, phos low   HOLD renvela powder at present   check phos daily  Monitor serum calcium and PO4

## 2020-02-23 LAB
ANION GAP SERPL CALC-SCNC: 15 MMOL/L — SIGNIFICANT CHANGE UP (ref 5–17)
APTT BLD: 154.8 SEC — CRITICAL HIGH (ref 27.5–36.3)
APTT BLD: 82.4 SEC — HIGH (ref 27.5–36.3)
BUN SERPL-MCNC: 56 MG/DL — HIGH (ref 7–23)
CALCIUM SERPL-MCNC: 8.3 MG/DL — LOW (ref 8.4–10.5)
CHLORIDE SERPL-SCNC: 95 MMOL/L — LOW (ref 96–108)
CO2 SERPL-SCNC: 26 MMOL/L — SIGNIFICANT CHANGE UP (ref 22–31)
CREAT SERPL-MCNC: 4.84 MG/DL — HIGH (ref 0.5–1.3)
GLUCOSE BLDC GLUCOMTR-MCNC: 166 MG/DL — HIGH (ref 70–99)
GLUCOSE BLDC GLUCOMTR-MCNC: 186 MG/DL — HIGH (ref 70–99)
GLUCOSE BLDC GLUCOMTR-MCNC: 189 MG/DL — HIGH (ref 70–99)
GLUCOSE BLDC GLUCOMTR-MCNC: 201 MG/DL — HIGH (ref 70–99)
GLUCOSE BLDC GLUCOMTR-MCNC: 242 MG/DL — HIGH (ref 70–99)
GLUCOSE SERPL-MCNC: 198 MG/DL — HIGH (ref 70–99)
HCT VFR BLD CALC: 23.9 % — LOW (ref 39–50)
HCT VFR BLD CALC: 25.4 % — LOW (ref 39–50)
HCT VFR BLD CALC: 26.7 % — LOW (ref 39–50)
HGB BLD-MCNC: 7.5 G/DL — LOW (ref 13–17)
HGB BLD-MCNC: 7.8 G/DL — LOW (ref 13–17)
HGB BLD-MCNC: 8.3 G/DL — LOW (ref 13–17)
INR BLD: 1.29 RATIO — HIGH (ref 0.88–1.16)
MAGNESIUM SERPL-MCNC: 2.1 MG/DL — SIGNIFICANT CHANGE UP (ref 1.6–2.6)
MCHC RBC-ENTMCNC: 29.1 PG — SIGNIFICANT CHANGE UP (ref 27–34)
MCHC RBC-ENTMCNC: 29.6 PG — SIGNIFICANT CHANGE UP (ref 27–34)
MCHC RBC-ENTMCNC: 29.6 PG — SIGNIFICANT CHANGE UP (ref 27–34)
MCHC RBC-ENTMCNC: 30.7 GM/DL — LOW (ref 32–36)
MCHC RBC-ENTMCNC: 31.1 GM/DL — LOW (ref 32–36)
MCHC RBC-ENTMCNC: 31.4 GM/DL — LOW (ref 32–36)
MCV RBC AUTO: 94.5 FL — SIGNIFICANT CHANGE UP (ref 80–100)
MCV RBC AUTO: 94.8 FL — SIGNIFICANT CHANGE UP (ref 80–100)
MCV RBC AUTO: 95.4 FL — SIGNIFICANT CHANGE UP (ref 80–100)
NRBC # BLD: 0 /100 WBCS — SIGNIFICANT CHANGE UP (ref 0–0)
PHOSPHATE SERPL-MCNC: 3.3 MG/DL — SIGNIFICANT CHANGE UP (ref 2.5–4.5)
PLATELET # BLD AUTO: 238 K/UL — SIGNIFICANT CHANGE UP (ref 150–400)
PLATELET # BLD AUTO: 245 K/UL — SIGNIFICANT CHANGE UP (ref 150–400)
PLATELET # BLD AUTO: 263 K/UL — SIGNIFICANT CHANGE UP (ref 150–400)
POTASSIUM SERPL-MCNC: 3.9 MMOL/L — SIGNIFICANT CHANGE UP (ref 3.5–5.3)
POTASSIUM SERPL-SCNC: 3.9 MMOL/L — SIGNIFICANT CHANGE UP (ref 3.5–5.3)
PROTHROM AB SERPL-ACNC: 15 SEC — HIGH (ref 10–12.9)
RBC # BLD: 2.53 M/UL — LOW (ref 4.2–5.8)
RBC # BLD: 2.68 M/UL — LOW (ref 4.2–5.8)
RBC # BLD: 2.8 M/UL — LOW (ref 4.2–5.8)
RBC # FLD: 16.7 % — HIGH (ref 10.3–14.5)
RBC # FLD: 17.1 % — HIGH (ref 10.3–14.5)
RBC # FLD: 17.2 % — HIGH (ref 10.3–14.5)
SODIUM SERPL-SCNC: 136 MMOL/L — SIGNIFICANT CHANGE UP (ref 135–145)
WBC # BLD: 7.8 K/UL — SIGNIFICANT CHANGE UP (ref 3.8–10.5)
WBC # BLD: 7.98 K/UL — SIGNIFICANT CHANGE UP (ref 3.8–10.5)
WBC # BLD: 8.81 K/UL — SIGNIFICANT CHANGE UP (ref 3.8–10.5)
WBC # FLD AUTO: 7.8 K/UL — SIGNIFICANT CHANGE UP (ref 3.8–10.5)
WBC # FLD AUTO: 7.98 K/UL — SIGNIFICANT CHANGE UP (ref 3.8–10.5)
WBC # FLD AUTO: 8.81 K/UL — SIGNIFICANT CHANGE UP (ref 3.8–10.5)

## 2020-02-23 PROCEDURE — 99233 SBSQ HOSP IP/OBS HIGH 50: CPT | Mod: GC

## 2020-02-23 RX ORDER — INSULIN LISPRO 100/ML
VIAL (ML) SUBCUTANEOUS EVERY 6 HOURS
Refills: 0 | Status: DISCONTINUED | OUTPATIENT
Start: 2020-02-23 | End: 2020-02-24

## 2020-02-23 RX ORDER — INSULIN LISPRO 100/ML
VIAL (ML) SUBCUTANEOUS AT BEDTIME
Refills: 0 | Status: DISCONTINUED | OUTPATIENT
Start: 2020-02-23 | End: 2020-02-23

## 2020-02-23 RX ORDER — DEXTROSE 50 % IN WATER 50 %
25 SYRINGE (ML) INTRAVENOUS ONCE
Refills: 0 | Status: DISCONTINUED | OUTPATIENT
Start: 2020-02-23 | End: 2020-02-27

## 2020-02-23 RX ORDER — GLUCAGON INJECTION, SOLUTION 0.5 MG/.1ML
1 INJECTION, SOLUTION SUBCUTANEOUS ONCE
Refills: 0 | Status: DISCONTINUED | OUTPATIENT
Start: 2020-02-23 | End: 2020-02-27

## 2020-02-23 RX ORDER — AMLODIPINE BESYLATE 2.5 MG/1
5 TABLET ORAL DAILY
Refills: 0 | Status: DISCONTINUED | OUTPATIENT
Start: 2020-02-23 | End: 2020-02-27

## 2020-02-23 RX ORDER — SODIUM CHLORIDE 9 MG/ML
1000 INJECTION, SOLUTION INTRAVENOUS
Refills: 0 | Status: DISCONTINUED | OUTPATIENT
Start: 2020-02-23 | End: 2020-02-27

## 2020-02-23 RX ORDER — DEXTROSE 50 % IN WATER 50 %
15 SYRINGE (ML) INTRAVENOUS ONCE
Refills: 0 | Status: DISCONTINUED | OUTPATIENT
Start: 2020-02-23 | End: 2020-02-27

## 2020-02-23 RX ORDER — DEXTROSE 50 % IN WATER 50 %
12.5 SYRINGE (ML) INTRAVENOUS ONCE
Refills: 0 | Status: DISCONTINUED | OUTPATIENT
Start: 2020-02-23 | End: 2020-02-27

## 2020-02-23 RX ORDER — INSULIN LISPRO 100/ML
VIAL (ML) SUBCUTANEOUS
Refills: 0 | Status: DISCONTINUED | OUTPATIENT
Start: 2020-02-23 | End: 2020-02-23

## 2020-02-23 RX ADMIN — LEVETIRACETAM 250 MILLIGRAM(S): 250 TABLET, FILM COATED ORAL at 17:54

## 2020-02-23 RX ADMIN — Medication 2: at 13:27

## 2020-02-23 RX ADMIN — PANTOPRAZOLE SODIUM 10 MG/HR: 20 TABLET, DELAYED RELEASE ORAL at 17:59

## 2020-02-23 RX ADMIN — LACOSAMIDE 50 MILLIGRAM(S): 50 TABLET ORAL at 05:56

## 2020-02-23 RX ADMIN — Medication 50 MICROGRAM(S): at 05:55

## 2020-02-23 RX ADMIN — Medication 100 MILLIGRAM(S): at 22:18

## 2020-02-23 RX ADMIN — AMLODIPINE BESYLATE 5 MILLIGRAM(S): 2.5 TABLET ORAL at 13:27

## 2020-02-23 RX ADMIN — Medication 1: at 01:11

## 2020-02-23 RX ADMIN — Medication 100 MILLIGRAM(S): at 05:55

## 2020-02-23 RX ADMIN — Medication 1: at 17:54

## 2020-02-23 RX ADMIN — Medication 3 MILLIGRAM(S): at 22:18

## 2020-02-23 RX ADMIN — Medication 2: at 05:56

## 2020-02-23 RX ADMIN — LEVETIRACETAM 250 MILLIGRAM(S): 250 TABLET, FILM COATED ORAL at 05:57

## 2020-02-23 RX ADMIN — CARVEDILOL PHOSPHATE 6.25 MILLIGRAM(S): 80 CAPSULE, EXTENDED RELEASE ORAL at 17:54

## 2020-02-23 RX ADMIN — HEPARIN SODIUM 1100 UNIT(S)/HR: 5000 INJECTION INTRAVENOUS; SUBCUTANEOUS at 02:55

## 2020-02-23 RX ADMIN — INSULIN GLARGINE 10 UNIT(S): 100 INJECTION, SOLUTION SUBCUTANEOUS at 22:18

## 2020-02-23 RX ADMIN — ATORVASTATIN CALCIUM 80 MILLIGRAM(S): 80 TABLET, FILM COATED ORAL at 22:18

## 2020-02-23 RX ADMIN — ESCITALOPRAM OXALATE 5 MILLIGRAM(S): 10 TABLET, FILM COATED ORAL at 12:54

## 2020-02-23 RX ADMIN — SENNA PLUS 1 TABLET(S): 8.6 TABLET ORAL at 22:18

## 2020-02-23 RX ADMIN — Medication 50 MILLIGRAM(S): at 22:18

## 2020-02-23 RX ADMIN — POLYETHYLENE GLYCOL 3350 17 GRAM(S): 17 POWDER, FOR SOLUTION ORAL at 12:55

## 2020-02-23 RX ADMIN — LACOSAMIDE 50 MILLIGRAM(S): 50 TABLET ORAL at 17:59

## 2020-02-23 RX ADMIN — Medication 1 TABLET(S): at 12:54

## 2020-02-23 RX ADMIN — CARVEDILOL PHOSPHATE 6.25 MILLIGRAM(S): 80 CAPSULE, EXTENDED RELEASE ORAL at 05:55

## 2020-02-23 RX ADMIN — HEPARIN SODIUM 0 UNIT(S)/HR: 5000 INJECTION INTRAVENOUS; SUBCUTANEOUS at 01:52

## 2020-02-23 RX ADMIN — Medication 100 MILLIGRAM(S): at 12:54

## 2020-02-23 NOTE — PROGRESS NOTE ADULT - PROBLEM SELECTOR PLAN 6
ESRD 2/2 diabetes; HD started in 6/2019. Currently awaiting kidney transplant; is on transplant list. MWF schedule.  - nephrology following, HD MWF  - c/w Midodrine 5mg prior to HD  - iPTH elevated at 93  - continue to monitor daily BMP, mg, and phos

## 2020-02-23 NOTE — PROGRESS NOTE ADULT - PROBLEM SELECTOR PLAN 1
P/w acute blood loss anemia in setting of melena likely 2/2 upper GI bleed  - s/p 1 PRBC 2/20 and 1 unit 2/21  - c/w protonix gtt  - will hold eliquis and plavix. Given drop in hgb this AM and bloody BM, holding heparin gtt. F/u w/ GI regarding possible scope  - maintain active type and screen, 2 large IV bores   - GI following, appreciate recs.  - CBCs q6h  -Of note nonbleeding ulcers on previous EGD from December P/w acute blood loss anemia in setting of melena likely 2/2 upper GI bleed  - s/p 1 PRBC 2/20 and 1 unit 2/21  - c/w protonix gtt  - will hold eliquis and plavix. Given drop in hgb this AM and bloody BM, holding heparin gtt. Per Dr. Acosta, plan for upper endoscopy tomorrow, NPO after midnight  - maintain active type and screen, 2 large IV bores   - GI following, appreciate recs.  - CBCs q6h  -Of note nonbleeding ulcers on previous EGD from December P/w acute blood loss anemia in setting of melena likely 2/2 upper GI bleed  - s/p 1 PRBC 2/20 and 1 unit 2/21  - c/w protonix gtt  - will hold eliquis and plavix. Given drop in hgb this AM and bloody BM, holding heparin gtt. Per Dr. Acosta, plan for upper endoscopy tomorrow, NPO after midnight  - maintain active type and screen, 2 large IV bores   - GI following, appreciate recs.  - CBCs q12h  -Of note nonbleeding ulcers on previous EGD from December

## 2020-02-23 NOTE — PROGRESS NOTE ADULT - PROBLEM SELECTOR PLAN 2
Likely 2/2 upper GIB. Presenting to ED with hgb 7.1, was 6.6 yesterday at HD. EGD in the past showing gastritis; currently not taking PPI or H2-blocker at rehab.  - per renal, transfuse hgb <7.5. S/p 2 unit PRBC, as above  - c/w protonix gtt  - maintain active type and screen, 2 large IV bores  - plan as above

## 2020-02-23 NOTE — PROVIDER CONTACT NOTE (CRITICAL VALUE NOTIFICATION) - ACTION/TREATMENT ORDERED:
MD Luigi Deleon notified. As per MD follow Full Nomogram. As per Full nomogram Heparin drip stopped for 60 minutes and restarted at rate of 11ml/hr. Will repeat labs at 8.26 am. Will continue to monitor.
MD aware. 1unit PRBC to be ordered. will continue to monitor. pt safety maintained

## 2020-02-23 NOTE — PROGRESS NOTE ADULT - ASSESSMENT
64 year old man with a history of CAD s/p stents 2009 and 2019, HTN, DM, Carotid stenosis, Hypothyroidism, and ESRD on HD, recently admitted for multiple intracerebral infarcts and is s/p TPA (now on ASA/Plavix) w/ hospital stay c/b afib (now on Eliquis) and status epilepticus requiring intubation, pt now extubated and w/ PEG tube in process of transitioning to whole foods, presenting from rehab w/ low hgb.     ESRD on HD via tunnelled HD cathter   on MWF at rehab  Pt tolerated HD friday with 2L removed  Labs reviewed. Pt clinically stable today   Plan for HD Monday (may need to coordinate with planned EGD)   Consent obtained from family   Monitor BMP and BP    Anemia  Hb low but stable  s/p pRBCs  Not on IGOR sec to recent CVA  Monitor Hb    HTN  BP elevated  Advise titrate up coreg today if okay with cardiology   Monitor BP    CKD-BMD  PTH low,   phos low however now improved today off binders  HOLD renvela powder at present   check phos daily  Monitor serum calcium and PO4

## 2020-02-23 NOTE — PROGRESS NOTE ADULT - SUBJECTIVE AND OBJECTIVE BOX
Patient is a 64y old  Male who presents with a chief complaint of Anemia, concern for GIB (22 Feb 2020 11:34)    SUBJECTIVE / OVERNIGHT EVENTS: This morning, patient denies any abdominal pain. His last BM was yesterday afternoon and he reports that it was brown and formed. This AM, hgb dropped to 7.8 from 8.9, heparin gtt on hold.     OBJECTIVE:  Vital Signs Last 24 Hrs  T(C): 36.5 (23 Feb 2020 05:50), Max: 37.2 (22 Feb 2020 16:27)  T(F): 97.7 (23 Feb 2020 05:50), Max: 99 (22 Feb 2020 16:27)  HR: 64 (23 Feb 2020 05:50) (64 - 78)  BP: 168/76 (23 Feb 2020 05:50) (154/71 - 184/86)  BP(mean): --  RR: 18 (23 Feb 2020 05:50) (17 - 19)  SpO2: 97% (23 Feb 2020 05:50) (97% - 99%)    I&O's Summary    22 Feb 2020 07:01  -  23 Feb 2020 07:00  --------------------------------------------------------  IN: 733 mL / OUT: 251 mL / NET: 482 mL    PHYSICAL EXAM:  GENERAL: cachectic, NAD, laying in bed   HEAD:  Atraumatic, Normocephalic  EYES: EOMI, conjunctiva and sclera clear  CHEST/LUNG: decreased breath sounds at bases, mild expiratory crackles   HEART: Regular rate and rhythm; No murmurs, rubs, or gallops  ABDOMEN: Soft, Nontender, Nondistended; Bowel sounds present  EXTREMITIES: mild LLE edema  SKIN: No rashes or lesions noted  NERVOUS SYSTEM:  Alert & Oriented X3, Good concentration                                           Labs:  CAPILLARY BLOOD GLUCOSE      POCT Blood Glucose.: 201 mg/dL (23 Feb 2020 05:47)  POCT Blood Glucose.: 189 mg/dL (23 Feb 2020 00:46)  POCT Blood Glucose.: 222 mg/dL (22 Feb 2020 21:39)  POCT Blood Glucose.: 165 mg/dL (22 Feb 2020 18:37)  POCT Blood Glucose.: 165 mg/dL (22 Feb 2020 12:36)                          7.8    8.81  )-----------( 245      ( 23 Feb 2020 01:10 )             25.4     02-23    136  |  95<L>  |  56<H>  ----------------------------<  198<H>  3.9   |  26  |  4.84<H>    Ca    8.3<L>      23 Feb 2020 07:18  Phos  3.3     02-23  Mg     2.1     02-23    TPro  6.8  /  Alb  3.0<L>  /  TBili  0.3  /  DBili  x   /  AST  16  /  ALT  21  /  AlkPhos  109  02-22    PT/INR - ( 23 Feb 2020 01:10 )   PT: 15.0 sec;   INR: 1.29 ratio         PTT - ( 23 Feb 2020 01:10 )  PTT:154.8 sec          Imaging Personally Reviewed:    Consultant(s) Notes Reviewed:   Care Discussed with Consultants/Other Providers:    MEDICATIONS  (STANDING):  atorvastatin 80 milliGRAM(s) Oral at bedtime  carvedilol 6.25 milliGRAM(s) Oral every 12 hours  dextrose 5%. 1000 milliLiter(s) (50 mL/Hr) IV Continuous <Continuous>  dextrose 50% Injectable 12.5 Gram(s) IV Push once  dextrose 50% Injectable 25 Gram(s) IV Push once  dextrose 50% Injectable 25 Gram(s) IV Push once  escitalopram 5 milliGRAM(s) Oral daily  insulin glargine Injectable (LANTUS) 10 Unit(s) SubCutaneous at bedtime  insulin lispro (HumaLOG) corrective regimen sliding scale   SubCutaneous every 6 hours  lacosamide Solution 50 milliGRAM(s) Oral two times a day  levETIRAcetam  Solution 125 milliGRAM(s) Oral <User Schedule>  levETIRAcetam  Solution 250 milliGRAM(s) Oral two times a day  levothyroxine 50 MICROGram(s) Oral daily  melatonin 3 milliGRAM(s) Oral at bedtime  Nephro-macie 1 Tablet(s) Oral daily  pantoprazole Infusion 8 mG/Hr (10 mL/Hr) IV Continuous <Continuous>  polyethylene glycol 3350 17 Gram(s) Oral daily  senna 1 Tablet(s) Oral at bedtime  traZODone 50 milliGRAM(s) Oral at bedtime    MEDICATIONS  (PRN):  dextrose 40% Gel 15 Gram(s) Oral once PRN Blood Glucose LESS THAN 70 milliGRAM(s)/deciliter  glucagon  Injectable 1 milliGRAM(s) IntraMuscular once PRN Glucose LESS THAN 70 milligrams/deciliter  guaiFENesin   Syrup  (Sugar-Free) 100 milliGRAM(s) Oral every 6 hours PRN Cough Patient is a 64y old  Male who presents with a chief complaint of Anemia, concern for GIB (22 Feb 2020 11:34)    SUBJECTIVE / OVERNIGHT EVENTS: This morning, patient denies any abdominal pain. His last BM was yesterday afternoon and he reports that it had "blood" although described it as a brown color. Per RN, patient had bloody formed BM last night at 7 PM.  This AM, hgb dropped to 7.8 from 8.9, holding heparin gtt.    OBJECTIVE:  Vital Signs Last 24 Hrs  T(C): 36.5 (23 Feb 2020 05:50), Max: 37.2 (22 Feb 2020 16:27)  T(F): 97.7 (23 Feb 2020 05:50), Max: 99 (22 Feb 2020 16:27)  HR: 64 (23 Feb 2020 05:50) (64 - 78)  BP: 168/76 (23 Feb 2020 05:50) (154/71 - 184/86)  BP(mean): --  RR: 18 (23 Feb 2020 05:50) (17 - 19)  SpO2: 97% (23 Feb 2020 05:50) (97% - 99%)    I&O's Summary    22 Feb 2020 07:01  -  23 Feb 2020 07:00  --------------------------------------------------------  IN: 733 mL / OUT: 251 mL / NET: 482 mL    PHYSICAL EXAM:  GENERAL: cachectic, NAD, laying in bed   HEAD:  Atraumatic, Normocephalic  EYES: EOMI, conjunctiva and sclera clear  CHEST/LUNG: decreased breath sounds at bases, mild expiratory crackles   HEART: Regular rate and rhythm; No murmurs, rubs, or gallops  ABDOMEN: Soft, Nontender, LUQ PEG in place, nondistended; Bowel sounds present  EXTREMITIES: mild LLE edema  SKIN: No rashes or lesions noted  NERVOUS SYSTEM:  Alert & Oriented X3, Good concentration                                           Labs:  CAPILLARY BLOOD GLUCOSE      POCT Blood Glucose.: 201 mg/dL (23 Feb 2020 05:47)  POCT Blood Glucose.: 189 mg/dL (23 Feb 2020 00:46)  POCT Blood Glucose.: 222 mg/dL (22 Feb 2020 21:39)  POCT Blood Glucose.: 165 mg/dL (22 Feb 2020 18:37)  POCT Blood Glucose.: 165 mg/dL (22 Feb 2020 12:36)                          7.8    8.81  )-----------( 245      ( 23 Feb 2020 01:10 )             25.4     02-23    136  |  95<L>  |  56<H>  ----------------------------<  198<H>  3.9   |  26  |  4.84<H>    Ca    8.3<L>      23 Feb 2020 07:18  Phos  3.3     02-23  Mg     2.1     02-23    TPro  6.8  /  Alb  3.0<L>  /  TBili  0.3  /  DBili  x   /  AST  16  /  ALT  21  /  AlkPhos  109  02-22    PT/INR - ( 23 Feb 2020 01:10 )   PT: 15.0 sec;   INR: 1.29 ratio         PTT - ( 23 Feb 2020 01:10 )  PTT:154.8 sec          Imaging Personally Reviewed:    Consultant(s) Notes Reviewed:   Care Discussed with Consultants/Other Providers:    MEDICATIONS  (STANDING):  atorvastatin 80 milliGRAM(s) Oral at bedtime  carvedilol 6.25 milliGRAM(s) Oral every 12 hours  dextrose 5%. 1000 milliLiter(s) (50 mL/Hr) IV Continuous <Continuous>  dextrose 50% Injectable 12.5 Gram(s) IV Push once  dextrose 50% Injectable 25 Gram(s) IV Push once  dextrose 50% Injectable 25 Gram(s) IV Push once  escitalopram 5 milliGRAM(s) Oral daily  insulin glargine Injectable (LANTUS) 10 Unit(s) SubCutaneous at bedtime  insulin lispro (HumaLOG) corrective regimen sliding scale   SubCutaneous every 6 hours  lacosamide Solution 50 milliGRAM(s) Oral two times a day  levETIRAcetam  Solution 125 milliGRAM(s) Oral <User Schedule>  levETIRAcetam  Solution 250 milliGRAM(s) Oral two times a day  levothyroxine 50 MICROGram(s) Oral daily  melatonin 3 milliGRAM(s) Oral at bedtime  Nephro-macie 1 Tablet(s) Oral daily  pantoprazole Infusion 8 mG/Hr (10 mL/Hr) IV Continuous <Continuous>  polyethylene glycol 3350 17 Gram(s) Oral daily  senna 1 Tablet(s) Oral at bedtime  traZODone 50 milliGRAM(s) Oral at bedtime    MEDICATIONS  (PRN):  dextrose 40% Gel 15 Gram(s) Oral once PRN Blood Glucose LESS THAN 70 milliGRAM(s)/deciliter  glucagon  Injectable 1 milliGRAM(s) IntraMuscular once PRN Glucose LESS THAN 70 milligrams/deciliter  guaiFENesin   Syrup  (Sugar-Free) 100 milliGRAM(s) Oral every 6 hours PRN Cough

## 2020-02-23 NOTE — PROGRESS NOTE ADULT - PROBLEM SELECTOR PLAN 5
BP elevated in the ED to 190s.   - c/w Coreg 6.25 mg BID  - if BP remains elevated, can consider 5mg IV hydralazine

## 2020-02-23 NOTE — PROGRESS NOTE ADULT - SUBJECTIVE AND OBJECTIVE BOX
[facility]   MD Matheus Gary MD, D.O. Ruoru Wong, PA    From 7 AM - 5 PM:  OFFICE: [phone]  Dr. Barrientos cell: [phone]  Dr. Rey cell: [phone]  Dr. Witt cell: [phone]  WALDO Valentin cell: [phone]    From 5 PM - 7 AM: Answering Service: [phone]      RENAL FOLLOW UP NOTE  --------------------------------------------------------------------------------  HPI: Pt seen and examined at bedside.   Denies SOB, chest pain or LE Edema     PAST HISTORY  --------------------------------------------------------------------------------  No significant changes to PMH, PSH, FHx, SHx, unless otherwise noted    ALLERGIES & MEDICATIONS  --------------------------------------------------------------------------------  Allergies    No Known Allergies    Intolerances      Standing Inpatient Medications  atorvastatin 80 milliGRAM(s) Oral at bedtime  carvedilol 6.25 milliGRAM(s) Oral every 12 hours  dextrose 5%. 1000 milliLiter(s) IV Continuous <Continuous>  dextrose 50% Injectable 12.5 Gram(s) IV Push once  dextrose 50% Injectable 25 Gram(s) IV Push once  dextrose 50% Injectable 25 Gram(s) IV Push once  escitalopram 5 milliGRAM(s) Oral daily  insulin glargine Injectable (LANTUS) 10 Unit(s) SubCutaneous at bedtime  insulin lispro (HumaLOG) corrective regimen sliding scale   SubCutaneous every 6 hours  lacosamide Solution 50 milliGRAM(s) Oral two times a day  levETIRAcetam  Solution 125 milliGRAM(s) Oral <User Schedule>  levETIRAcetam  Solution 250 milliGRAM(s) Oral two times a day  levothyroxine 50 MICROGram(s) Oral daily  melatonin 3 milliGRAM(s) Oral at bedtime  Nephro-macie 1 Tablet(s) Oral daily  pantoprazole Infusion 8 mG/Hr IV Continuous <Continuous>  polyethylene glycol 3350 17 Gram(s) Oral daily  senna 1 Tablet(s) Oral at bedtime  traZODone 50 milliGRAM(s) Oral at bedtime    PRN Inpatient Medications  dextrose 40% Gel 15 Gram(s) Oral once PRN  glucagon  Injectable 1 milliGRAM(s) IntraMuscular once PRN  guaiFENesin   Syrup  (Sugar-Free) 100 milliGRAM(s) Oral every 6 hours PRN      REVIEW OF SYSTEMS  --------------------------------------------------------------------------------  General: no fever  CVS: no chest pain  RESP: no sob, no cough  ABD: no abdominal pain  : no dysuria  MSK: no edema     VITALS/PHYSICAL EXAM  --------------------------------------------------------------------------------  T(C): 36.5 (02-23-20 @ 09:00), Max: 37.2 (02-22-20 @ 16:27)  HR: 65 (02-23-20 @ 09:00) (64 - 72)  BP: 153/74 (02-23-20 @ 09:00) (153/74 - 184/86)  RR: 18 (02-23-20 @ 09:00) (17 - 19)  SpO2: 100% (02-23-20 @ 09:00) (97% - 100%)  Wt(kg): --        02-22-20 @ 07:01  -  02-23-20 @ 07:00  --------------------------------------------------------  IN: 733 mL / OUT: 251 mL / NET: 482 mL    02-23-20 @ 07:01  -  02-23-20 @ 10:56  --------------------------------------------------------  IN: 320 mL / OUT: 0 mL / NET: 320 mL      Physical Exam:  	Gen: NAD  	HEENT: MMM  	Pulm: CTA B/L  	CV: S1S2  	Abd: Soft, +BS  	Ext: No LE edema B/L                      Neuro: Awake   	Skin: Warm and Dry   	Vascular access: + tunneled Catheter     LABS/STUDIES  --------------------------------------------------------------------------------              8.3    7.98  >-----------<  263      [02-23-20 @ 08:41]              26.7     136  |  95  |  56  ----------------------------<  198      [02-23-20 @ 07:18]  3.9   |  26  |  4.84        Ca     8.3     [02-23-20 @ 07:18]      Mg     2.1     [02-23-20 @ 07:18]      Phos  3.3     [02-23-20 @ 07:18]    TPro  6.8  /  Alb  3.0  /  TBili  0.3  /  DBili  x   /  AST  16  /  ALT  21  /  AlkPhos  109  [02-22-20 @ 04:45]    PT/INR: PT 15.0 , INR 1.29       [02-23-20 @ 01:10]  PTT: 82.4       [02-23-20 @ 08:41]      Creatinine Trend:  SCr 4.84 [02-23 @ 07:18]  SCr 2.98 [02-22 @ 04:45]  SCr 4.69 [02-21 @ 05:09]  SCr 3.88 [02-20 @ 14:15]        Iron 45, TIBC 145, %sat 31      [12-26-19 @ 23:24]  Ferritin 1369      [12-26-19 @ 23:24]  PTH -- (Ca 8.4)      [02-21-20 @ 08:54]   93  PTH -- (Ca 6.9)      [06-05-19 @ 08:14]   562  HbA1c 6.0      [02-21-20 @ 08:53]  TSH 6.22      [01-03-20 @ 10:03]  Lipid: chol 134, , HDL 56, LDL 52      [12-15-19 @ 10:56]

## 2020-02-23 NOTE — PROVIDER CONTACT NOTE (CRITICAL VALUE NOTIFICATION) - BACKGROUND
pt admitted for low H&H and suspected GI bleed. pt restarted on heparin drip today after H&h stabilized. rate of heparin drip 13ml/hr. pt has hx of intracerebral attacks and Afib. pt is on FULL NOMOGRAM.
admitted for anemia and r/o GI Bleed

## 2020-02-23 NOTE — PROVIDER CONTACT NOTE (CRITICAL VALUE NOTIFICATION) - ASSESSMENT
pt is resting comfortably and denies and symptoms.
pt resting comfortably in bed, no c/o pain, no s/s of distress, no s/s of bleeding. all VSS at this time.

## 2020-02-23 NOTE — PROGRESS NOTE ADULT - PROBLEM SELECTOR PLAN 3
History of atrial fibrillation with [id number] of 6. Was taking Eliquis at home for AC  - heparin gtt discontinued in setting of hgb drop fro, 8.1 to 6.6. AC as above History of atrial fibrillation with [id number] of 6. Was taking Eliquis at home for AC  - heparin gtt discontinued in setting of hgb drop, plan as above

## 2020-02-23 NOTE — PROGRESS NOTE ADULT - SUBJECTIVE AND OBJECTIVE BOX
GASTROENTEROLOGY    Patient seen and examined, OOB to chair  Awake, alert  He denies nausea/vomiting/abdominal pain  Bloody formed stool last night reported  Hgb dropped from 8.9-->7.8  Heparin infusion held    MEDICATIONS  (STANDING):  atorvastatin 80 milliGRAM(s) Oral at bedtime  carvedilol 6.25 milliGRAM(s) Oral every 12 hours  dextrose 5%. 1000 milliLiter(s) (50 mL/Hr) IV Continuous <Continuous>  dextrose 50% Injectable 12.5 Gram(s) IV Push once  dextrose 50% Injectable 25 Gram(s) IV Push once  dextrose 50% Injectable 25 Gram(s) IV Push once  escitalopram 5 milliGRAM(s) Oral daily  insulin glargine Injectable (LANTUS) 10 Unit(s) SubCutaneous at bedtime  insulin lispro (HumaLOG) corrective regimen sliding scale   SubCutaneous every 6 hours  lacosamide Solution 50 milliGRAM(s) Oral two times a day  levETIRAcetam  Solution 125 milliGRAM(s) Oral <User Schedule>  levETIRAcetam  Solution 250 milliGRAM(s) Oral two times a day  levothyroxine 50 MICROGram(s) Oral daily  melatonin 3 milliGRAM(s) Oral at bedtime  Nephro-macie 1 Tablet(s) Oral daily  pantoprazole Infusion 8 mG/Hr (10 mL/Hr) IV Continuous <Continuous>  polyethylene glycol 3350 17 Gram(s) Oral daily  senna 1 Tablet(s) Oral at bedtime  traZODone 50 milliGRAM(s) Oral at bedtime        T(F): 97.7 (02-23-20 @ 09:00), Max: 99 (02-22-20 @ 16:27)  HR: 65 (02-23-20 @ 09:00) (64 - 72)  BP: 153/74 (02-23-20 @ 09:00) (153/74 - 184/86)  RR: 18 (02-23-20 @ 09:00) (17 - 19)  SpO2: 100% (02-23-20 @ 09:00) (97% - 100%)  Wt(kg): --    PHYSICAL EXAM  GENERAL:   NAD  HEENT:  NC/AT, no JVD  CHEST:  clear to ascultation bilaterally, respirations nonlabored  HEART:  +S1+S2   ABDOMEN:  Soft, non-tender, non-distended, + bowel sounds, + peg, site CDI  EXTREMITIES:  no cyanosis, clubbing or edema  NEURO:  Alert, responding appropriately  SKIN:  No rash/warm/dry      LABS:                        8.3<L>  7.98  )-----------( 263      ( 23 Feb 2020 08:41 )             26.7<L>  23 Feb 2020 08:41    02-23    136  |  95<L>  |  56<H>  ----------------------------<  198<H>  3.9   |  26  |  4.84<H>    Ca    8.3<L>      23 Feb 2020 07:18  Phos  3.3     02-23  Mg     2.1     02-23    TPro  6.8  /  Alb  3.0<L>  /  TBili  0.3  /  DBili  x   /  AST  16  /  ALT  21  /  AlkPhos  109  02-22    LIVER FUNCTIONS - ( 22 Feb 2020 04:45 )  Alb: 3.0 g/dL / Pro: 6.8 g/dL / ALK PHOS: 109 U/L / ALT: 21 U/L / AST: 16 U/L / GGT: x           PT/INR - ( 23 Feb 2020 01:10 )   PT: 15.0 sec;   INR: 1.29 ratio         PTT - ( 23 Feb 2020 08:41 )  PTT:82.4 sec  I&O's Detail    22 Feb 2020 07:01  -  23 Feb 2020 07:00  --------------------------------------------------------  IN:    heparin  Infusion.: 13 mL    Oral Fluid: 600 mL    pantoprazole Infusion: 120 mL  Total IN: 733 mL    OUT:    Stool: 1 mL    Voided: 250 mL  Total OUT: 251 mL    Total NET: 482 mL      23 Feb 2020 07:01  -  23 Feb 2020 11:26  --------------------------------------------------------  IN:    Oral Fluid: 320 mL  Total IN: 320 mL    OUT:  Total OUT: 0 mL    Total NET: 320 mL

## 2020-02-24 LAB
ANION GAP SERPL CALC-SCNC: 14 MMOL/L — SIGNIFICANT CHANGE UP (ref 5–17)
BLD GP AB SCN SERPL QL: NEGATIVE — SIGNIFICANT CHANGE UP
BUN SERPL-MCNC: 74 MG/DL — HIGH (ref 7–23)
CALCIUM SERPL-MCNC: 8.9 MG/DL — SIGNIFICANT CHANGE UP (ref 8.4–10.5)
CHLORIDE SERPL-SCNC: 95 MMOL/L — LOW (ref 96–108)
CO2 SERPL-SCNC: 24 MMOL/L — SIGNIFICANT CHANGE UP (ref 22–31)
CREAT SERPL-MCNC: 6.75 MG/DL — HIGH (ref 0.5–1.3)
GLUCOSE BLDC GLUCOMTR-MCNC: 113 MG/DL — HIGH (ref 70–99)
GLUCOSE BLDC GLUCOMTR-MCNC: 139 MG/DL — HIGH (ref 70–99)
GLUCOSE BLDC GLUCOMTR-MCNC: 155 MG/DL — HIGH (ref 70–99)
GLUCOSE BLDC GLUCOMTR-MCNC: 160 MG/DL — HIGH (ref 70–99)
GLUCOSE BLDC GLUCOMTR-MCNC: 69 MG/DL — LOW (ref 70–99)
GLUCOSE BLDC GLUCOMTR-MCNC: 99 MG/DL — SIGNIFICANT CHANGE UP (ref 70–99)
GLUCOSE SERPL-MCNC: 100 MG/DL — HIGH (ref 70–99)
HCT VFR BLD CALC: 24.7 % — LOW (ref 39–50)
HCT VFR BLD CALC: 25.1 % — LOW (ref 39–50)
HGB BLD-MCNC: 7.8 G/DL — LOW (ref 13–17)
HGB BLD-MCNC: 7.9 G/DL — LOW (ref 13–17)
MAGNESIUM SERPL-MCNC: 2.1 MG/DL — SIGNIFICANT CHANGE UP (ref 1.6–2.6)
MCHC RBC-ENTMCNC: 29.9 PG — SIGNIFICANT CHANGE UP (ref 27–34)
MCHC RBC-ENTMCNC: 30.1 PG — SIGNIFICANT CHANGE UP (ref 27–34)
MCHC RBC-ENTMCNC: 31.5 GM/DL — LOW (ref 32–36)
MCHC RBC-ENTMCNC: 31.6 GM/DL — LOW (ref 32–36)
MCV RBC AUTO: 95.1 FL — SIGNIFICANT CHANGE UP (ref 80–100)
MCV RBC AUTO: 95.4 FL — SIGNIFICANT CHANGE UP (ref 80–100)
NRBC # BLD: 0 /100 WBCS — SIGNIFICANT CHANGE UP (ref 0–0)
NRBC # BLD: 0 /100 WBCS — SIGNIFICANT CHANGE UP (ref 0–0)
PHOSPHATE SERPL-MCNC: 3.7 MG/DL — SIGNIFICANT CHANGE UP (ref 2.5–4.5)
PLATELET # BLD AUTO: 256 K/UL — SIGNIFICANT CHANGE UP (ref 150–400)
PLATELET # BLD AUTO: 258 K/UL — SIGNIFICANT CHANGE UP (ref 150–400)
POTASSIUM SERPL-MCNC: 4.2 MMOL/L — SIGNIFICANT CHANGE UP (ref 3.5–5.3)
POTASSIUM SERPL-SCNC: 4.2 MMOL/L — SIGNIFICANT CHANGE UP (ref 3.5–5.3)
RBC # BLD: 2.59 M/UL — LOW (ref 4.2–5.8)
RBC # BLD: 2.64 M/UL — LOW (ref 4.2–5.8)
RBC # FLD: 16.2 % — HIGH (ref 10.3–14.5)
RBC # FLD: 16.3 % — HIGH (ref 10.3–14.5)
RH IG SCN BLD-IMP: POSITIVE — SIGNIFICANT CHANGE UP
SODIUM SERPL-SCNC: 133 MMOL/L — LOW (ref 135–145)
WBC # BLD: 7.88 K/UL — SIGNIFICANT CHANGE UP (ref 3.8–10.5)
WBC # BLD: 9.04 K/UL — SIGNIFICANT CHANGE UP (ref 3.8–10.5)
WBC # FLD AUTO: 7.88 K/UL — SIGNIFICANT CHANGE UP (ref 3.8–10.5)
WBC # FLD AUTO: 9.04 K/UL — SIGNIFICANT CHANGE UP (ref 3.8–10.5)

## 2020-02-24 PROCEDURE — 99233 SBSQ HOSP IP/OBS HIGH 50: CPT | Mod: GC

## 2020-02-24 RX ORDER — APIXABAN 2.5 MG/1
5 TABLET, FILM COATED ORAL EVERY 12 HOURS
Refills: 0 | Status: DISCONTINUED | OUTPATIENT
Start: 2020-02-24 | End: 2020-02-27

## 2020-02-24 RX ORDER — CLOPIDOGREL BISULFATE 75 MG/1
75 TABLET, FILM COATED ORAL DAILY
Refills: 0 | Status: DISCONTINUED | OUTPATIENT
Start: 2020-02-24 | End: 2020-02-27

## 2020-02-24 RX ORDER — PANTOPRAZOLE SODIUM 20 MG/1
40 TABLET, DELAYED RELEASE ORAL
Refills: 0 | Status: DISCONTINUED | OUTPATIENT
Start: 2020-02-24 | End: 2020-02-27

## 2020-02-24 RX ORDER — DEXTROSE 50 % IN WATER 50 %
12.5 SYRINGE (ML) INTRAVENOUS ONCE
Refills: 0 | Status: COMPLETED | OUTPATIENT
Start: 2020-02-24 | End: 2020-02-24

## 2020-02-24 RX ORDER — INSULIN LISPRO 100/ML
VIAL (ML) SUBCUTANEOUS
Refills: 0 | Status: DISCONTINUED | OUTPATIENT
Start: 2020-02-24 | End: 2020-02-27

## 2020-02-24 RX ADMIN — Medication 1 TABLET(S): at 12:50

## 2020-02-24 RX ADMIN — CARVEDILOL PHOSPHATE 6.25 MILLIGRAM(S): 80 CAPSULE, EXTENDED RELEASE ORAL at 06:38

## 2020-02-24 RX ADMIN — Medication 1: at 17:20

## 2020-02-24 RX ADMIN — INSULIN GLARGINE 10 UNIT(S): 100 INJECTION, SOLUTION SUBCUTANEOUS at 23:13

## 2020-02-24 RX ADMIN — CARVEDILOL PHOSPHATE 6.25 MILLIGRAM(S): 80 CAPSULE, EXTENDED RELEASE ORAL at 23:14

## 2020-02-24 RX ADMIN — PANTOPRAZOLE SODIUM 10 MG/HR: 20 TABLET, DELAYED RELEASE ORAL at 12:49

## 2020-02-24 RX ADMIN — LACOSAMIDE 50 MILLIGRAM(S): 50 TABLET ORAL at 06:38

## 2020-02-24 RX ADMIN — POLYETHYLENE GLYCOL 3350 17 GRAM(S): 17 POWDER, FOR SOLUTION ORAL at 12:49

## 2020-02-24 RX ADMIN — LEVETIRACETAM 125 MILLIGRAM(S): 250 TABLET, FILM COATED ORAL at 06:38

## 2020-02-24 RX ADMIN — Medication 3 MILLIGRAM(S): at 23:14

## 2020-02-24 RX ADMIN — ESCITALOPRAM OXALATE 5 MILLIGRAM(S): 10 TABLET, FILM COATED ORAL at 12:50

## 2020-02-24 RX ADMIN — LACOSAMIDE 50 MILLIGRAM(S): 50 TABLET ORAL at 16:57

## 2020-02-24 RX ADMIN — LEVETIRACETAM 250 MILLIGRAM(S): 250 TABLET, FILM COATED ORAL at 23:14

## 2020-02-24 RX ADMIN — Medication 12.5 GRAM(S): at 06:12

## 2020-02-24 RX ADMIN — ATORVASTATIN CALCIUM 80 MILLIGRAM(S): 80 TABLET, FILM COATED ORAL at 23:14

## 2020-02-24 RX ADMIN — Medication 50 MICROGRAM(S): at 06:39

## 2020-02-24 RX ADMIN — Medication 50 MILLIGRAM(S): at 23:14

## 2020-02-24 RX ADMIN — Medication 1: at 01:22

## 2020-02-24 RX ADMIN — APIXABAN 5 MILLIGRAM(S): 2.5 TABLET, FILM COATED ORAL at 16:57

## 2020-02-24 RX ADMIN — AMLODIPINE BESYLATE 5 MILLIGRAM(S): 2.5 TABLET ORAL at 06:38

## 2020-02-24 NOTE — PROGRESS NOTE ADULT - PROBLEM SELECTOR PLAN 1
P/w acute blood loss anemia in setting of melena likely 2/2 upper GI bleed  - Of note nonbleeding ulcers on previous EGD from December  - s/p 1 PRBC 2/20 and 1 unit 2/21  - c/w protonix gtt  - will hold eliquis and plavix. Given drop in hgb this AM and bloody BM, holding heparin gtt. Per Dr. Acosta, plan for upper endoscopy tomorrow, NPO after midnight  - maintain active type and screen, 2 large IV bores   - CBCs q12h  - GI following P/w acute blood loss anemia in setting of melena likely 2/2 upper GI bleed  - Of note nonbleeding ulcers on previous EGD from December  - s/p 1 PRBC 2/20 and 1 unit 2/21  - protonix 40 PO QD  - maintain active type and screen, 2 large IV bores   - CBCs q12h  - GI following; no more active intervention at this time, resume plavix and eliquis P/w acute blood loss anemia in setting of melena likely 2/2 upper GI bleed  - Of note nonbleeding ulcers on previous EGD from December  - s/p 1 PRBC 2/20 and 1 unit 2/21  - repeat EGD (2/24)- no source of bleeding identified, intact gastrostomy tube  - protonix 40 PO QD  - maintain active type and screen, 2 large IV bores   - CBCs q12h  - GI following; no active intervention at this time, resume plavix and eliquis

## 2020-02-24 NOTE — PROGRESS NOTE ADULT - SUBJECTIVE AND OBJECTIVE BOX
PROGRESS NOTE:     Patient is a 64y old  Male who presents with a chief complaint of Anemia, concern for GIB (23 Feb 2020 11:25)      SUBJECTIVE / OVERNIGHT EVENTS: Pt seen and examined. Made NPO for scope. Pt reported no fever, chills, CP, SOB, abdominal pain, N/V, urinary or bowel issues, or new joint aches.     ADDITIONAL REVIEW OF SYSTEMS: Otherwise negative    MEDICATIONS  (STANDING):  amLODIPine   Tablet 5 milliGRAM(s) Oral daily  atorvastatin 80 milliGRAM(s) Oral at bedtime  carvedilol 6.25 milliGRAM(s) Oral every 12 hours  dextrose 5%. 1000 milliLiter(s) (50 mL/Hr) IV Continuous <Continuous>  dextrose 50% Injectable 12.5 Gram(s) IV Push once  dextrose 50% Injectable 25 Gram(s) IV Push once  dextrose 50% Injectable 25 Gram(s) IV Push once  escitalopram 5 milliGRAM(s) Oral daily  insulin glargine Injectable (LANTUS) 10 Unit(s) SubCutaneous at bedtime  insulin lispro (HumaLOG) corrective regimen sliding scale   SubCutaneous every 6 hours  lacosamide Solution 50 milliGRAM(s) Oral two times a day  levETIRAcetam  Solution 125 milliGRAM(s) Oral <User Schedule>  levETIRAcetam  Solution 250 milliGRAM(s) Oral two times a day  levothyroxine 50 MICROGram(s) Oral daily  melatonin 3 milliGRAM(s) Oral at bedtime  Nephro-macie 1 Tablet(s) Oral daily  pantoprazole Infusion 8 mG/Hr (10 mL/Hr) IV Continuous <Continuous>  polyethylene glycol 3350 17 Gram(s) Oral daily  senna 1 Tablet(s) Oral at bedtime  traZODone 50 milliGRAM(s) Oral at bedtime    MEDICATIONS  (PRN):  dextrose 40% Gel 15 Gram(s) Oral once PRN Blood Glucose LESS THAN 70 milliGRAM(s)/deciliter  glucagon  Injectable 1 milliGRAM(s) IntraMuscular once PRN Glucose LESS THAN 70 milligrams/deciliter  guaiFENesin   Syrup  (Sugar-Free) 100 milliGRAM(s) Oral every 6 hours PRN Cough      CAPILLARY BLOOD GLUCOSE      POCT Blood Glucose.: 69 mg/dL (24 Feb 2020 06:05)  POCT Blood Glucose.: 160 mg/dL (24 Feb 2020 00:48)  POCT Blood Glucose.: 166 mg/dL (23 Feb 2020 21:52)  POCT Blood Glucose.: 186 mg/dL (23 Feb 2020 17:06)  POCT Blood Glucose.: 242 mg/dL (23 Feb 2020 12:58)    I&O's Summary    22 Feb 2020 07:01  -  23 Feb 2020 07:00  --------------------------------------------------------  IN: 733 mL / OUT: 251 mL / NET: 482 mL    23 Feb 2020 07:01  -  24 Feb 2020 06:45  --------------------------------------------------------  IN: 1160 mL / OUT: 0 mL / NET: 1160 mL        PHYSICAL EXAM:  Vital Signs Last 24 Hrs  T(C): 36.7 (24 Feb 2020 05:33), Max: 36.9 (23 Feb 2020 20:28)  T(F): 98 (24 Feb 2020 05:33), Max: 98.4 (23 Feb 2020 20:28)  HR: 68 (24 Feb 2020 05:33) (65 - 77)  BP: 155/74 (24 Feb 2020 05:33) (144/70 - 170/77)  BP(mean): --  RR: 18 (24 Feb 2020 05:33) (18 - 20)  SpO2: 98% (24 Feb 2020 05:33) (98% - 100%)    CONSTITUTIONAL: NAD, well-developed  RESPIRATORY: Normal respiratory effort; lungs are clear to auscultation bilaterally  CARDIOVASCULAR: Regular rate and rhythm, normal S1 and S2, no murmur/rub/gallop  ABDOMEN: Nontender to palpation, normoactive bowel sounds, no rebound/guarding; No hepatosplenomegaly  EXTREMITIES: No lower extremity edema; Peripheral pulses are 2+ bilaterally  MUSCLOSKELETAL: no clubbing or cyanosis of digits; no joint swelling or tenderness to palpation  PSYCH: A+O to person, place, and time; affect appropriate    LABS:                        7.5    7.80  )-----------( 238      ( 23 Feb 2020 20:32 )             23.9     02-23    136  |  95<L>  |  56<H>  ----------------------------<  198<H>  3.9   |  26  |  4.84<H>    Ca    8.3<L>      23 Feb 2020 07:18  Phos  3.3     02-23  Mg     2.1     02-23      PT/INR - ( 23 Feb 2020 01:10 )   PT: 15.0 sec;   INR: 1.29 ratio         PTT - ( 23 Feb 2020 08:41 )  PTT:82.4 sec            RADIOLOGY & ADDITIONAL TESTS:  Results Reviewed:   Imaging Personally Reviewed:  Electrocardiogram Personally Reviewed:    COORDINATION OF CARE:  Care Discussed with Consultants/Other Providers [Y/N]:  Prior or Outpatient Records Reviewed [Y/N]: PROGRESS NOTE:     Patient is a 64y old  Male who presents with a chief complaint of Anemia, concern for GIB (23 Feb 2020 11:25)      SUBJECTIVE / OVERNIGHT EVENTS: Pt seen and examined. Made NPO for scope. Pt reported no fever, chills, CP, SOB, abdominal pain, N/V, urinary or bowel issues.     ADDITIONAL REVIEW OF SYSTEMS: Otherwise negative    MEDICATIONS  (STANDING):  amLODIPine   Tablet 5 milliGRAM(s) Oral daily  atorvastatin 80 milliGRAM(s) Oral at bedtime  carvedilol 6.25 milliGRAM(s) Oral every 12 hours  dextrose 5%. 1000 milliLiter(s) (50 mL/Hr) IV Continuous <Continuous>  dextrose 50% Injectable 12.5 Gram(s) IV Push once  dextrose 50% Injectable 25 Gram(s) IV Push once  dextrose 50% Injectable 25 Gram(s) IV Push once  escitalopram 5 milliGRAM(s) Oral daily  insulin glargine Injectable (LANTUS) 10 Unit(s) SubCutaneous at bedtime  insulin lispro (HumaLOG) corrective regimen sliding scale   SubCutaneous every 6 hours  lacosamide Solution 50 milliGRAM(s) Oral two times a day  levETIRAcetam  Solution 125 milliGRAM(s) Oral <User Schedule>  levETIRAcetam  Solution 250 milliGRAM(s) Oral two times a day  levothyroxine 50 MICROGram(s) Oral daily  melatonin 3 milliGRAM(s) Oral at bedtime  Nephro-macie 1 Tablet(s) Oral daily  pantoprazole Infusion 8 mG/Hr (10 mL/Hr) IV Continuous <Continuous>  polyethylene glycol 3350 17 Gram(s) Oral daily  senna 1 Tablet(s) Oral at bedtime  traZODone 50 milliGRAM(s) Oral at bedtime    MEDICATIONS  (PRN):  dextrose 40% Gel 15 Gram(s) Oral once PRN Blood Glucose LESS THAN 70 milliGRAM(s)/deciliter  glucagon  Injectable 1 milliGRAM(s) IntraMuscular once PRN Glucose LESS THAN 70 milligrams/deciliter  guaiFENesin   Syrup  (Sugar-Free) 100 milliGRAM(s) Oral every 6 hours PRN Cough      CAPILLARY BLOOD GLUCOSE      POCT Blood Glucose.: 69 mg/dL (24 Feb 2020 06:05)  POCT Blood Glucose.: 160 mg/dL (24 Feb 2020 00:48)  POCT Blood Glucose.: 166 mg/dL (23 Feb 2020 21:52)  POCT Blood Glucose.: 186 mg/dL (23 Feb 2020 17:06)  POCT Blood Glucose.: 242 mg/dL (23 Feb 2020 12:58)    I&O's Summary    22 Feb 2020 07:01  -  23 Feb 2020 07:00  --------------------------------------------------------  IN: 733 mL / OUT: 251 mL / NET: 482 mL    23 Feb 2020 07:01  -  24 Feb 2020 06:45  --------------------------------------------------------  IN: 1160 mL / OUT: 0 mL / NET: 1160 mL        PHYSICAL EXAM:  Vital Signs Last 24 Hrs  T(C): 36.7 (24 Feb 2020 05:33), Max: 36.9 (23 Feb 2020 20:28)  T(F): 98 (24 Feb 2020 05:33), Max: 98.4 (23 Feb 2020 20:28)  HR: 68 (24 Feb 2020 05:33) (65 - 77)  BP: 155/74 (24 Feb 2020 05:33) (144/70 - 170/77)  BP(mean): --  RR: 18 (24 Feb 2020 05:33) (18 - 20)  SpO2: 98% (24 Feb 2020 05:33) (98% - 100%)    CONSTITUTIONAL: NAD, well-developed  RESPIRATORY: Normal respiratory effort; lungs are clear to auscultation bilaterally  CARDIOVASCULAR: Regular rate and rhythm, normal S1 and S2, no murmur/rub/gallop  ABDOMEN: Nontender to palpation, normoactive bowel sounds, no rebound/guarding; No hepatosplenomegaly  EXTREMITIES: No lower extremity edema; Peripheral pulses are 2+ bilaterally  MUSCLOSKELETAL: no clubbing or cyanosis of digits; no joint swelling or tenderness to palpation  PSYCH: A+O to person, place, and time; affect appropriate    LABS:                        7.5    7.80  )-----------( 238      ( 23 Feb 2020 20:32 )             23.9     02-23    136  |  95<L>  |  56<H>  ----------------------------<  198<H>  3.9   |  26  |  4.84<H>    Ca    8.3<L>      23 Feb 2020 07:18  Phos  3.3     02-23  Mg     2.1     02-23      PT/INR - ( 23 Feb 2020 01:10 )   PT: 15.0 sec;   INR: 1.29 ratio         PTT - ( 23 Feb 2020 08:41 )  PTT:82.4 sec            RADIOLOGY & ADDITIONAL TESTS:  Results Reviewed:   Imaging Personally Reviewed:  Electrocardiogram Personally Reviewed:    COORDINATION OF CARE:  Care Discussed with Consultants/Other Providers [Y/N]:  Prior or Outpatient Records Reviewed [Y/N]:

## 2020-02-24 NOTE — PROGRESS NOTE ADULT - ASSESSMENT
64 year old man with a history of CAD s/p stents 2009 and 2019, HTN, DM, Carotid stenosis, Hypothyroidism, and ESRD on HD, recently admitted for multiple intracerebral infarcts and is s/p TPA (now on ASA/Plavix) w/ hospital stay c/b afib (now on Eliquis) and status epilepticus requiring intubation, pt now extubated and w/ PEG tube in process of transitioning to [employer], presenting from rehab w/ low hgb.     ESRD on HD via tunnelled HD cathter   on MWF at rehab  Pt tolerated HD on 2/21 with 2L removed  Plan for HD today   Consent obtained from family   Monitor BMP and BP    Anemia  Hb low but stable  s/p pRBCs  Not on IGOR sec to recent CVA  Monitor Hb  Planned for EGD today    HTN  BP fluctuating  Monitor BP    CKD-BMD  PTH low,   check phos daily  Monitor serum calcium and PO4

## 2020-02-24 NOTE — PROGRESS NOTE ADULT - PROBLEM SELECTOR PLAN 9
DVT prophylaxis: holding AC in the setting of GIB  GI: protonix gtt  Diet: Dysphagia 2 diet with thin liquids with nepro supplement once daily (feeds need to be no beef, pork); from 8PM to 8AM nepro @ 70cc/hr DVT prophylaxis: holding AC in the setting of GIB  GI: protonix gtt  Diet: Dysphagia 2 diet with thin liquids with nepro supplement once daily (feeds need to be no beef, pork); from 8PM to 8AM nepro @ 70cc/hr  Dispo: PT recommends [place] DVT prophylaxis: eliquis and plavix  GI: protonix 40 PO QD  Diet: Dysphagia 2 diet with thin liquids with nepro supplement once daily (feeds need to be no beef, pork); from 8PM to 8AM nepro @ 70cc/hr  Dispo: PT recommends [place] DVT prophylaxis: On Eliquis  GI: Protonix 40 PO QD  Diet: Dysphagia 2 diet with thin liquids with Nepro supplement once daily (feeds need to be no beef, pork); from 8PM to 8AM nepro @ 70cc/hr  Dispo: PT recommends [place]

## 2020-02-24 NOTE — PROGRESS NOTE ADULT - PROBLEM SELECTOR PLAN 8
CAD  - S/p stents in 2009 and 6/2019.   - will hold Plavix  - c/w atorvastatin 80mg once daily    Hypothyroidism  - c/w Levothyroxine 50mcg once daily CAD  - S/p stents in 2009 and 6/2019.   - c/w plavix 75mg PO QD  - c/w atorvastatin 80mg once daily    Hypothyroidism  - c/w Levothyroxine 50mcg once daily

## 2020-02-24 NOTE — PROGRESS NOTE ADULT - PROBLEM SELECTOR PLAN 4
PMHx CVA c/b status epilepticus s/p intubation and PEG in 12/2019  - holding clopidogrel in setting of bleed  - will c/w keppra and vimpat for prophylaxis against seizure PMHx CVA c/b status epilepticus s/p intubation and PEG in 12/2019  - c/w keppra and vimpat for prophylaxis against seizure  - continue eliquis and plavix

## 2020-02-24 NOTE — PROGRESS NOTE ADULT - PROBLEM SELECTOR PLAN 2
Likely 2/2 upper GIB. Presenting to ED with hgb 7.1, was 6.6 yesterday at HD. EGD in the past showing gastritis; currently not taking PPI or H2-blocker at rehab.  - per renal, transfuse hgb <7.5. S/p 2 unit PRBC, as above  - c/w protonix gtt  - maintain active type and screen, 2 large IV bores  - plan as above Likely 2/2 upper GIB. Presenting to ED with hgb 7.1, was 6.6 at HD. EGD in the past showing gastritis; currently not taking PPI or H2-blocker at rehab.  - per renal, transfuse hgb <7.5. S/p 2 unit PRBC, as above  - protonix 40 PO QD  - maintain active type and screen, 2 large IV bores  - plan as above

## 2020-02-24 NOTE — PROVIDER CONTACT NOTE (OTHER) - RECOMMENDATIONS
Notify HCP. activate hypoglycemia protocol and reepeat BG in 15 minutres. Notify HCP. activate hypoglycemia protocol and repeat BG in 15 minutes.

## 2020-02-24 NOTE — PROGRESS NOTE ADULT - SUBJECTIVE AND OBJECTIVE BOX
[facility]   MD NINA MASON MD RUORU WONG, PA    TEL:  OFFICE: [phone]  DR HSU CELL: [phone]  RAS MORELOS CELL: [phone]  DR. MARQUEZ CELL: [phone]  DR. ALEX CELL: [phone]    FROM 5 PM - 7 AM PLEASE CALL ANSWERING SERVICE: [phone]    RENAL FOLLOW UP NOTE  --------------------------------------------------------------------------------  HPI:      Pt seen and examined at bedside.   Denies SOB, chest pain     PAST HISTORY  --------------------------------------------------------------------------------  No significant changes to PMH, PSH, FHx, SHx, unless otherwise noted    ALLERGIES & MEDICATIONS  --------------------------------------------------------------------------------  Allergies    No Known Allergies    Intolerances      Standing Inpatient Medications  amLODIPine   Tablet 5 milliGRAM(s) Oral daily  atorvastatin 80 milliGRAM(s) Oral at bedtime  carvedilol 6.25 milliGRAM(s) Oral every 12 hours  dextrose 5%. 1000 milliLiter(s) IV Continuous <Continuous>  dextrose 50% Injectable 12.5 Gram(s) IV Push once  dextrose 50% Injectable 25 Gram(s) IV Push once  dextrose 50% Injectable 25 Gram(s) IV Push once  escitalopram 5 milliGRAM(s) Oral daily  insulin glargine Injectable (LANTUS) 10 Unit(s) SubCutaneous at bedtime  insulin lispro (HumaLOG) corrective regimen sliding scale   SubCutaneous every 6 hours  lacosamide Solution 50 milliGRAM(s) Oral two times a day  levETIRAcetam  Solution 125 milliGRAM(s) Oral <User Schedule>  levETIRAcetam  Solution 250 milliGRAM(s) Oral two times a day  levothyroxine 50 MICROGram(s) Oral daily  melatonin 3 milliGRAM(s) Oral at bedtime  Nephro-macie 1 Tablet(s) Oral daily  pantoprazole Infusion 8 mG/Hr IV Continuous <Continuous>  polyethylene glycol 3350 17 Gram(s) Oral daily  senna 1 Tablet(s) Oral at bedtime  traZODone 50 milliGRAM(s) Oral at bedtime    PRN Inpatient Medications  dextrose 40% Gel 15 Gram(s) Oral once PRN  glucagon  Injectable 1 milliGRAM(s) IntraMuscular once PRN  guaiFENesin   Syrup  (Sugar-Free) 100 milliGRAM(s) Oral every 6 hours PRN      REVIEW OF SYSTEMS  --------------------------------------------------------------------------------  General: no fever  CVS: no chest pain  RESP: no sob, no cough  ABD: no abdominal pain  MSK: no edema     VITALS/PHYSICAL EXAM  --------------------------------------------------------------------------------  T(C): 36.7 (02-24-20 @ 05:33), Max: 36.9 (02-23-20 @ 20:28)  HR: 68 (02-24-20 @ 05:33) (65 - 77)  BP: 155/74 (02-24-20 @ 05:33) (144/70 - 170/77)  RR: 18 (02-24-20 @ 05:33) (18 - 20)  SpO2: 98% (02-24-20 @ 05:33) (98% - 100%)  Wt(kg): --        02-23-20 @ 07:01  -  02-24-20 @ 07:00  --------------------------------------------------------  IN: 1280 mL / OUT: 200 mL / NET: 1080 mL      Physical Exam:  	Gen: NAD  	HEENT: MMM  	Pulm: CTA B/L  	CV: S1S2  	Abd: Soft, +BS  	Ext: No LE edema B/L                      Neuro: Awake   	Skin: Warm and Dry   	Vascular access: perma cath    LABS/STUDIES  --------------------------------------------------------------------------------              7.8    9.04  >-----------<  258      [02-24-20 @ 09:16]              24.7     136  |  95  |  56  ----------------------------<  198      [02-23-20 @ 07:18]  3.9   |  26  |  4.84        Ca     8.3     [02-23-20 @ 07:18]      Mg     2.1     [02-23-20 @ 07:18]      Phos  3.3     [02-23-20 @ 07:18]      PT/INR: PT 15.0 , INR 1.29       [02-23-20 @ 01:10]  PTT: 82.4       [02-23-20 @ 08:41]      Creatinine Trend:  SCr 4.84 [02-23 @ 07:18]  SCr 2.98 [02-22 @ 04:45]  SCr 4.69 [02-21 @ 05:09]  SCr 3.88 [02-20 @ 14:15]        Iron 45, TIBC 145, %sat 31      [12-26-19 @ 23:24]  Ferritin 1369      [12-26-19 @ 23:24]  PTH -- (Ca 8.4)      [02-21-20 @ 08:54]   93  PTH -- (Ca 6.9)      [06-05-19 @ 08:14]   562  HbA1c 6.0      [02-21-20 @ 08:53]  TSH 6.22      [01-03-20 @ 10:03]  Lipid: chol 134, , HDL 56, LDL 52      [12-15-19 @ 10:56]

## 2020-02-24 NOTE — PROVIDER CONTACT NOTE (OTHER) - ACTION/TREATMENT ORDERED:
MD notified. Hypoglycemia protocol activated and 12.5mg of Dextrose 50% given. Will repeat BG in 15 minutes. Will continue to monitor.

## 2020-02-24 NOTE — PROGRESS NOTE ADULT - PROBLEM SELECTOR PLAN 3
History of atrial fibrillation with [id number] of 6. Was taking Eliquis at home for AC  - heparin gtt discontinued in setting of hgb drop, plan as above History of atrial fibrillation with [id number] of 6. Was taking Eliquis at home for AC  - continue eliquis and plavix

## 2020-02-24 NOTE — PROGRESS NOTE ADULT - PROBLEM SELECTOR PLAN 5
BP elevated in the ED to 190s.   - c/w Coreg 6.25 mg BID  - if BP remains elevated, can consider 5mg IV hydralazine BP elevated in the ED to 190s.   - c/w Coreg 6.25 mg BID and amlodipine 5mg QD  - if BP remains elevated, can consider 5mg IV hydralazine

## 2020-02-25 LAB
ANION GAP SERPL CALC-SCNC: 12 MMOL/L — SIGNIFICANT CHANGE UP (ref 5–17)
BUN SERPL-MCNC: 41 MG/DL — HIGH (ref 7–23)
CALCIUM SERPL-MCNC: 8.3 MG/DL — LOW (ref 8.4–10.5)
CHLORIDE SERPL-SCNC: 93 MMOL/L — LOW (ref 96–108)
CO2 SERPL-SCNC: 26 MMOL/L — SIGNIFICANT CHANGE UP (ref 22–31)
CREAT SERPL-MCNC: 4.67 MG/DL — HIGH (ref 0.5–1.3)
GLUCOSE BLDC GLUCOMTR-MCNC: 145 MG/DL — HIGH (ref 70–99)
GLUCOSE BLDC GLUCOMTR-MCNC: 200 MG/DL — HIGH (ref 70–99)
GLUCOSE BLDC GLUCOMTR-MCNC: 227 MG/DL — HIGH (ref 70–99)
GLUCOSE BLDC GLUCOMTR-MCNC: 275 MG/DL — HIGH (ref 70–99)
GLUCOSE SERPL-MCNC: 176 MG/DL — HIGH (ref 70–99)
HCT VFR BLD CALC: 25.5 % — LOW (ref 39–50)
HGB BLD-MCNC: 8 G/DL — LOW (ref 13–17)
MCHC RBC-ENTMCNC: 30 PG — SIGNIFICANT CHANGE UP (ref 27–34)
MCHC RBC-ENTMCNC: 31.4 GM/DL — LOW (ref 32–36)
MCV RBC AUTO: 95.5 FL — SIGNIFICANT CHANGE UP (ref 80–100)
NRBC # BLD: 0 /100 WBCS — SIGNIFICANT CHANGE UP (ref 0–0)
PLATELET # BLD AUTO: 258 K/UL — SIGNIFICANT CHANGE UP (ref 150–400)
POTASSIUM SERPL-MCNC: 4 MMOL/L — SIGNIFICANT CHANGE UP (ref 3.5–5.3)
POTASSIUM SERPL-SCNC: 4 MMOL/L — SIGNIFICANT CHANGE UP (ref 3.5–5.3)
RBC # BLD: 2.67 M/UL — LOW (ref 4.2–5.8)
RBC # FLD: 16.2 % — HIGH (ref 10.3–14.5)
SODIUM SERPL-SCNC: 131 MMOL/L — LOW (ref 135–145)
WBC # BLD: 7.19 K/UL — SIGNIFICANT CHANGE UP (ref 3.8–10.5)
WBC # FLD AUTO: 7.19 K/UL — SIGNIFICANT CHANGE UP (ref 3.8–10.5)

## 2020-02-25 PROCEDURE — 99233 SBSQ HOSP IP/OBS HIGH 50: CPT | Mod: GC

## 2020-02-25 RX ADMIN — Medication 50 MICROGRAM(S): at 06:05

## 2020-02-25 RX ADMIN — Medication 1: at 09:09

## 2020-02-25 RX ADMIN — CARVEDILOL PHOSPHATE 6.25 MILLIGRAM(S): 80 CAPSULE, EXTENDED RELEASE ORAL at 06:05

## 2020-02-25 RX ADMIN — Medication 50 MILLIGRAM(S): at 22:20

## 2020-02-25 RX ADMIN — LACOSAMIDE 50 MILLIGRAM(S): 50 TABLET ORAL at 18:23

## 2020-02-25 RX ADMIN — ATORVASTATIN CALCIUM 80 MILLIGRAM(S): 80 TABLET, FILM COATED ORAL at 22:20

## 2020-02-25 RX ADMIN — CARVEDILOL PHOSPHATE 6.25 MILLIGRAM(S): 80 CAPSULE, EXTENDED RELEASE ORAL at 18:21

## 2020-02-25 RX ADMIN — Medication 3: at 12:40

## 2020-02-25 RX ADMIN — POLYETHYLENE GLYCOL 3350 17 GRAM(S): 17 POWDER, FOR SOLUTION ORAL at 12:41

## 2020-02-25 RX ADMIN — Medication 3 MILLIGRAM(S): at 22:20

## 2020-02-25 RX ADMIN — APIXABAN 5 MILLIGRAM(S): 2.5 TABLET, FILM COATED ORAL at 06:06

## 2020-02-25 RX ADMIN — INSULIN GLARGINE 10 UNIT(S): 100 INJECTION, SOLUTION SUBCUTANEOUS at 22:21

## 2020-02-25 RX ADMIN — ESCITALOPRAM OXALATE 5 MILLIGRAM(S): 10 TABLET, FILM COATED ORAL at 12:41

## 2020-02-25 RX ADMIN — Medication 2: at 22:21

## 2020-02-25 RX ADMIN — CLOPIDOGREL BISULFATE 75 MILLIGRAM(S): 75 TABLET, FILM COATED ORAL at 12:41

## 2020-02-25 RX ADMIN — LEVETIRACETAM 250 MILLIGRAM(S): 250 TABLET, FILM COATED ORAL at 18:22

## 2020-02-25 RX ADMIN — APIXABAN 5 MILLIGRAM(S): 2.5 TABLET, FILM COATED ORAL at 18:22

## 2020-02-25 RX ADMIN — SENNA PLUS 1 TABLET(S): 8.6 TABLET ORAL at 22:21

## 2020-02-25 RX ADMIN — PANTOPRAZOLE SODIUM 40 MILLIGRAM(S): 20 TABLET, DELAYED RELEASE ORAL at 06:05

## 2020-02-25 RX ADMIN — Medication 0: at 18:21

## 2020-02-25 RX ADMIN — LACOSAMIDE 50 MILLIGRAM(S): 50 TABLET ORAL at 06:06

## 2020-02-25 RX ADMIN — AMLODIPINE BESYLATE 5 MILLIGRAM(S): 2.5 TABLET ORAL at 06:05

## 2020-02-25 RX ADMIN — Medication 1 TABLET(S): at 12:41

## 2020-02-25 NOTE — PROGRESS NOTE ADULT - SUBJECTIVE AND OBJECTIVE BOX
PROGRESS NOTE:     Patient is a 64y old  Male who presents with a chief complaint of Anemia, concern for GIB (24 Feb 2020 07:58)      SUBJECTIVE / OVERNIGHT EVENTS: Pt seen and examined. No acute overnight events. Pt reported no fever, chills, CP, SOB, abdominal pain, N/V, urinary or bowel issues, or new joint aches.     ADDITIONAL REVIEW OF SYSTEMS: Otherwise negative    MEDICATIONS  (STANDING):  amLODIPine   Tablet 5 milliGRAM(s) Oral daily  apixaban 5 milliGRAM(s) Enteral Tube every 12 hours  atorvastatin 80 milliGRAM(s) Oral at bedtime  carvedilol 6.25 milliGRAM(s) Oral every 12 hours  clopidogrel Tablet 75 milliGRAM(s) Oral daily  dextrose 5%. 1000 milliLiter(s) (50 mL/Hr) IV Continuous <Continuous>  dextrose 50% Injectable 12.5 Gram(s) IV Push once  dextrose 50% Injectable 25 Gram(s) IV Push once  dextrose 50% Injectable 25 Gram(s) IV Push once  escitalopram 5 milliGRAM(s) Oral daily  insulin glargine Injectable (LANTUS) 10 Unit(s) SubCutaneous at bedtime  insulin lispro (HumaLOG) corrective regimen sliding scale   SubCutaneous Before meals and at bedtime  lacosamide Solution 50 milliGRAM(s) Oral two times a day  levETIRAcetam  Solution 125 milliGRAM(s) Oral <User Schedule>  levETIRAcetam  Solution 250 milliGRAM(s) Oral two times a day  levothyroxine 50 MICROGram(s) Oral daily  melatonin 3 milliGRAM(s) Oral at bedtime  Nephro-macie 1 Tablet(s) Oral daily  pantoprazole    Tablet 40 milliGRAM(s) Oral before breakfast  polyethylene glycol 3350 17 Gram(s) Oral daily  senna 1 Tablet(s) Oral at bedtime  traZODone 50 milliGRAM(s) Oral at bedtime    MEDICATIONS  (PRN):  dextrose 40% Gel 15 Gram(s) Oral once PRN Blood Glucose LESS THAN 70 milliGRAM(s)/deciliter  glucagon  Injectable 1 milliGRAM(s) IntraMuscular once PRN Glucose LESS THAN 70 milligrams/deciliter  guaiFENesin   Syrup  (Sugar-Free) 100 milliGRAM(s) Oral every 6 hours PRN Cough      CAPILLARY BLOOD GLUCOSE      POCT Blood Glucose.: 139 mg/dL (24 Feb 2020 22:43)  POCT Blood Glucose.: 155 mg/dL (24 Feb 2020 17:18)  POCT Blood Glucose.: 99 mg/dL (24 Feb 2020 12:09)    I&O's Summary    23 Feb 2020 07:01  -  24 Feb 2020 07:00  --------------------------------------------------------  IN: 1280 mL / OUT: 200 mL / NET: 1080 mL    24 Feb 2020 07:01  -  25 Feb 2020 06:54  --------------------------------------------------------  IN: 240 mL / OUT: 250 mL / NET: -10 mL        PHYSICAL EXAM:  Vital Signs Last 24 Hrs  T(C): 37 (25 Feb 2020 04:39), Max: 37.3 (24 Feb 2020 21:06)  T(F): 98.6 (25 Feb 2020 04:39), Max: 99.1 (24 Feb 2020 21:06)  HR: 67 (25 Feb 2020 04:39) (62 - 70)  BP: 108/61 (25 Feb 2020 04:39) (108/61 - 168/79)  BP(mean): --  RR: 18 (25 Feb 2020 04:39) (16 - 18)  SpO2: 97% (25 Feb 2020 04:39) (94% - 99%)    CONSTITUTIONAL: NAD, well-developed  RESPIRATORY: Normal respiratory effort; lungs are clear to auscultation bilaterally  CARDIOVASCULAR: Regular rate and rhythm, normal S1 and S2, no murmur/rub/gallop  ABDOMEN: Nontender to palpation, normoactive bowel sounds, no rebound/guarding; No hepatosplenomegaly  EXTREMITIES: No lower extremity edema; Peripheral pulses are 2+ bilaterally  MUSCLOSKELETAL: no clubbing or cyanosis of digits; no joint swelling or tenderness to palpation  PSYCH: A+O to person, place, and time; affect appropriate    LABS:                        8.0    7.19  )-----------( 258      ( 25 Feb 2020 06:04 )             25.5     02-25    131<L>  |  93<L>  |  41<H>  ----------------------------<  176<H>  4.0   |  26  |  4.67<H>    Ca    8.3<L>      25 Feb 2020 06:04  Phos  3.7     02-24  Mg     2.1     02-24      PTT - ( 23 Feb 2020 08:41 )  PTT:82.4 sec            RADIOLOGY & ADDITIONAL TESTS:  Results Reviewed:   Imaging Personally Reviewed:  Electrocardiogram Personally Reviewed:    COORDINATION OF CARE:  Care Discussed with Consultants/Other Providers [Y/N]:  Prior or Outpatient Records Reviewed [Y/N]: PROGRESS NOTE:     Patient is a 64y old  Male who presents with a chief complaint of Anemia, concern for GIB (24 Feb 2020 07:58)    SUBJECTIVE / OVERNIGHT EVENTS: Pt seen and examined. No acute overnight events. Pt reported no fever, chills, CP, SOB, abdominal pain, N/V, urinary or bowel issues.    ADDITIONAL REVIEW OF SYSTEMS: Otherwise negative    MEDICATIONS  (STANDING):  amLODIPine   Tablet 5 milliGRAM(s) Oral daily  apixaban 5 milliGRAM(s) Enteral Tube every 12 hours  atorvastatin 80 milliGRAM(s) Oral at bedtime  carvedilol 6.25 milliGRAM(s) Oral every 12 hours  clopidogrel Tablet 75 milliGRAM(s) Oral daily  dextrose 5%. 1000 milliLiter(s) (50 mL/Hr) IV Continuous <Continuous>  dextrose 50% Injectable 12.5 Gram(s) IV Push once  dextrose 50% Injectable 25 Gram(s) IV Push once  dextrose 50% Injectable 25 Gram(s) IV Push once  escitalopram 5 milliGRAM(s) Oral daily  insulin glargine Injectable (LANTUS) 10 Unit(s) SubCutaneous at bedtime  insulin lispro (HumaLOG) corrective regimen sliding scale   SubCutaneous Before meals and at bedtime  lacosamide Solution 50 milliGRAM(s) Oral two times a day  levETIRAcetam  Solution 125 milliGRAM(s) Oral <User Schedule>  levETIRAcetam  Solution 250 milliGRAM(s) Oral two times a day  levothyroxine 50 MICROGram(s) Oral daily  melatonin 3 milliGRAM(s) Oral at bedtime  Nephro-macie 1 Tablet(s) Oral daily  pantoprazole    Tablet 40 milliGRAM(s) Oral before breakfast  polyethylene glycol 3350 17 Gram(s) Oral daily  senna 1 Tablet(s) Oral at bedtime  traZODone 50 milliGRAM(s) Oral at bedtime    MEDICATIONS  (PRN):  dextrose 40% Gel 15 Gram(s) Oral once PRN Blood Glucose LESS THAN 70 milliGRAM(s)/deciliter  glucagon  Injectable 1 milliGRAM(s) IntraMuscular once PRN Glucose LESS THAN 70 milligrams/deciliter  guaiFENesin   Syrup  (Sugar-Free) 100 milliGRAM(s) Oral every 6 hours PRN Cough      CAPILLARY BLOOD GLUCOSE      POCT Blood Glucose.: 139 mg/dL (24 Feb 2020 22:43)  POCT Blood Glucose.: 155 mg/dL (24 Feb 2020 17:18)  POCT Blood Glucose.: 99 mg/dL (24 Feb 2020 12:09)    I&O's Summary    23 Feb 2020 07:01  -  24 Feb 2020 07:00  --------------------------------------------------------  IN: 1280 mL / OUT: 200 mL / NET: 1080 mL    24 Feb 2020 07:01  -  25 Feb 2020 06:54  --------------------------------------------------------  IN: 240 mL / OUT: 250 mL / NET: -10 mL        PHYSICAL EXAM:  Vital Signs Last 24 Hrs  T(C): 37 (25 Feb 2020 04:39), Max: 37.3 (24 Feb 2020 21:06)  T(F): 98.6 (25 Feb 2020 04:39), Max: 99.1 (24 Feb 2020 21:06)  HR: 67 (25 Feb 2020 04:39) (62 - 70)  BP: 108/61 (25 Feb 2020 04:39) (108/61 - 168/79)  BP(mean): --  RR: 18 (25 Feb 2020 04:39) (16 - 18)  SpO2: 97% (25 Feb 2020 04:39) (94% - 99%)    CONSTITUTIONAL: NAD, well-developed  RESPIRATORY: Normal respiratory effort; lungs are clear to auscultation bilaterally  CARDIOVASCULAR: Regular rate and rhythm, normal S1 and S2, no murmur/rub/gallop  ABDOMEN: Nontender to palpation, normoactive bowel sounds, no rebound/guarding; No hepatosplenomegaly  EXTREMITIES: No lower extremity edema; Peripheral pulses are 2+ bilaterally  MUSCLOSKELETAL: no clubbing or cyanosis of digits; no joint swelling or tenderness to palpation  PSYCH: A+O to person, place, and time; affect appropriate    LABS:                        8.0    7.19  )-----------( 258      ( 25 Feb 2020 06:04 )             25.5     02-25    131<L>  |  93<L>  |  41<H>  ----------------------------<  176<H>  4.0   |  26  |  4.67<H>    Ca    8.3<L>      25 Feb 2020 06:04  Phos  3.7     02-24  Mg     2.1     02-24      PTT - ( 23 Feb 2020 08:41 )  PTT:82.4 sec      RADIOLOGY & ADDITIONAL TESTS:  Results Reviewed:   Imaging Personally Reviewed:  Electrocardiogram Personally Reviewed:    COORDINATION OF CARE:  Care Discussed with Consultants/Other Providers [Y/N]:  Prior or Outpatient Records Reviewed [Y/N]:

## 2020-02-25 NOTE — SWALLOW BEDSIDE ASSESSMENT ADULT - NS ASR SWALLOW FINDINGS DISCUS
Patient/Family/Nursing/RN: MD Sia: Maritza, pt's son at bedside.
Family/Dr. Barrientos; RN Sia; SLP [facility] Jazmin; Wife at bedside/Physician/Nursing/Patient

## 2020-02-25 NOTE — PROGRESS NOTE ADULT - PROBLEM SELECTOR PLAN 1
P/w acute blood loss anemia in setting of melena likely 2/2 upper GI bleed  - Of note nonbleeding ulcers on previous EGD from December  - s/p 1 PRBC 2/20 and 1 unit 2/21  - repeat EGD (2/24)- no source of bleeding identified, intact gastrostomy tube  - protonix 40 PO QD  - maintain active type and screen, 2 large IV bores   - CBCs q12h  - GI following; no active intervention at this time, resume plavix and eliquis P/w acute blood loss anemia in setting of melena likely 2/2 upper GI bleed  - Of note nonbleeding ulcers on previous EGD from December  - s/p 1 PRBC 2/20 and 1 unit 2/21  - repeat EGD (2/24)- no source of bleeding identified, intact gastrostomy tube  - protonix 40 PO QD  - maintain active type and screen, 2 large IV bores   - CBCs QD  - GI following; no active intervention at this time, resume plavix and eliquis

## 2020-02-25 NOTE — SWALLOW BEDSIDE ASSESSMENT ADULT - SWALLOW EVAL: CURRENT DIET
Dysphagia II Mechanical soft and thin liquids and PEG feeds
Dysphagia II Mechanical soft and thin liquids and PEG feeds

## 2020-02-25 NOTE — PROGRESS NOTE ADULT - PROBLEM SELECTOR PLAN 9
DVT prophylaxis: On Eliquis  GI: Protonix 40 PO QD  Diet: Dysphagia 2 diet with thin liquids with Nepro supplement once daily (feeds need to be no beef, pork); from 8PM to 8AM nepro @ 70cc/hr  Dispo: PT recommends OK

## 2020-02-25 NOTE — PROGRESS NOTE ADULT - PROBLEM SELECTOR PLAN 4
PMHx CVA c/b status epilepticus s/p intubation and PEG in 12/2019  - c/w keppra and vimpat for prophylaxis against seizure  - continue eliquis and plavix PMHx CVA c/b status epilepticus s/p intubation and PEG in 12/2019  - c/w Keppra and Vimpat for prophylaxis against seizure  - continue Eliquis and Plavix

## 2020-02-25 NOTE — SWALLOW BEDSIDE ASSESSMENT ADULT - ASR SWALLOW DENTITION
incomplete
incomplete/Partially missing lower molars bilaterally; partially missing upper left dentition

## 2020-02-25 NOTE — PROGRESS NOTE ADULT - PROBLEM SELECTOR PLAN 3
History of atrial fibrillation with [id number] of 6. Was taking Eliquis at home for AC  - continue eliquis and plavix History of atrial fibrillation with [id number] of 6. Was taking Eliquis at home for AC  - continue Eliquis and Plavix

## 2020-02-25 NOTE — PROGRESS NOTE ADULT - ASSESSMENT
64 year old man with a history of CAD s/p stents 2009 and 2019, HTN, DM, Carotid stenosis, Hypothyroidism, and ESRD on HD, recently admitted for multiple intracerebral infarcts and is s/p TPA (now on ASA/Plavix) w/ hospital stay c/b afib (now on Eliquis) and status epilepticus requiring intubation, pt now extubated and w/ PEG tube in process of transitioning to [employer], presenting from rehab w/ low hgb.     ESRD on HD via tunnelled HD cathter   on MWF at rehab  Pt tolerated HD on 2/24 with 2L removed  Plan for HD tomorrow  Monitor BMP and BP    Anemia  Hb low but stable  s/p pRBCs  Not on IGOR sec to recent CVA  Monitor Hb  s/p  EGD 2/24     HTN  BP fluctuating  Monitor BP    CKD-BMD  PTH low,   check phos daily  Monitor serum calcium and PO4

## 2020-02-25 NOTE — PROGRESS NOTE ADULT - ASSESSMENT
Patient is a 63 yo M w/ PMH HTN, T2DM, CAD s/p stent 2009, 2019, carotid stenosis, ESRD on HD (MWF), CVA (12/2019) s/p tpa (on plavix) c/b dysphagia s/p PEG, Atrial fibrillation (on eliquis) who presents from rehab facility w/ a hgb level of 6.6. He endorsed having a BM w/ dark stools on Saturday, 2/15 but has resolved as per son.     UGIB   Acute blood loss anemia    - patient reportedly had melena x 1 last Saturday. As per son, melena has resolved, patient has been having normal Bms throughout the week. Last bm was yesterday and reportedly normal.   - s/p 2u prbc   - recently had EGD with PEG placement 12/27. EGD demonstrated some non-bleeding gastric ulcers  - a/c on hold  - await repeat cbc today  - if hgb remains stable, plan to restart heparin infusion this evening  - continue PPI infusion  - please maintain active type and screen  - monitor cbc, transfuse prn  - monitor vitals  - monitor stool color  - continue bowel regimen   - discussed with Dr. Centeno and Hospitalist  - will follow Patient is a 63 yo M w/ PMH HTN, T2DM, CAD s/p stent 2009, 2019, carotid stenosis, ESRD on HD (MWF), CVA (12/2019) s/p tpa (on plavix) c/b dysphagia s/p PEG, Atrial fibrillation (on eliquis) who presents from rehab facility w/ a hgb level of 6.6. He endorsed having a BM w/ dark stools on Saturday, 2/15 but has resolved as per son.     UGIB   Acute blood loss anemia    - s/p  upper gastrointestinal endoscopy 2/24 with normal esophagus.  - Intact gastrostomy with a patent G-tube present characterized by healthy appearing mucosa.  - Normal examined duodenum.  - No specimens collected  - Resume previous diet.  - Use Protonix (pantoprazole) 40 mg PO daily  - No objection to AC                                                                                                        - EGD with PEG placement 12/27. EGD with non-bleeding gastric ulcers    - please maintain active type and screen  - monitor cbc, transfuse prn  - monitor vitals  - monitor stool color  - continue bowel regimen   - will follow

## 2020-02-25 NOTE — SWALLOW BEDSIDE ASSESSMENT ADULT - SWALLOW EVAL: RECOMMENDED DIET
Defer to MD/team at this time as exam deferred give subjective c/o SOB.
1) Mechanical soft texture diet 2) Use repeat swallows per spoon/forkful 3) Small bites - chew food thoroughly

## 2020-02-25 NOTE — SWALLOW BEDSIDE ASSESSMENT ADULT - SWALLOW EVAL: DIAGNOSIS
Chart reviewed, upon entry to pt's room pt noted to be walking from bathroom to bed with PCA. Pt subsequently presented with elevated RR and c/o SOB, RN: Sia alerted and Spo2 measured 96% on RA, MD: Maritza alerted of above. Given pt's elevated RR and subjective c/o SOB, evaluation deferred, service will f/u when clinically appropriate.
Pt seen bedside for swallow examination. Pt approved by Dr. Barrientos (Team [phone]) to trial all consistencies of PO with report of no active GI bleed. Furthermore, EGD 2/24 unremarkable for GI bleed. Pt presents with grossly functional oropharyngeal swallow mechanism. Mild difficulty with mastication of hard/soft solid due to incomplete upper/lower dentition vs. true oral dysphagia. Fairly timely swallow initiation and adequate laryngeal elevation upon palpation. Multiple swallows with soft and hard solids, cleared with a cued repeat dry swallow. No overt signs or symptoms of penetration or aspiration across consistencies.

## 2020-02-25 NOTE — PROGRESS NOTE ADULT - PROBLEM SELECTOR PLAN 8
CAD  - S/p stents in 2009 and 6/2019.   - c/w plavix 75mg PO QD  - c/w atorvastatin 80mg once daily    Hypothyroidism  - c/w Levothyroxine 50mcg once daily CAD  - S/p stents in 2009 and 6/2019.   - c/w Plavix 75mg PO QD  - c/w atorvastatin 80mg once daily    Hypothyroidism  - c/w Levothyroxine 50mcg once daily

## 2020-02-25 NOTE — PROGRESS NOTE ADULT - SUBJECTIVE AND OBJECTIVE BOX
[facility]   MD NINA MASON MD RUORU WONG, PA    TEL:  OFFICE: [phone]  DR HSU CELL: [phone]  RAS MORELOS CELL: [phone]  DR. MARQUEZ CELL: [phone]  DR. ALEX CELL: [phone]    FROM 5 PM - 7 AM PLEASE CALL ANSWERING SERVICE: [phone]    RENAL FOLLOW UP NOTE  --------------------------------------------------------------------------------  HPI:      Pt seen and examined at bedside.   Denies SOB, chest pain   SItting up in chair    PAST HISTORY  --------------------------------------------------------------------------------  No significant changes to PMH, PSH, FHx, SHx, unless otherwise noted    ALLERGIES & MEDICATIONS  --------------------------------------------------------------------------------  Allergies    No Known Allergies    Intolerances      Standing Inpatient Medications  amLODIPine   Tablet 5 milliGRAM(s) Oral daily  apixaban 5 milliGRAM(s) Enteral Tube every 12 hours  atorvastatin 80 milliGRAM(s) Oral at bedtime  carvedilol 6.25 milliGRAM(s) Oral every 12 hours  clopidogrel Tablet 75 milliGRAM(s) Oral daily  dextrose 5%. 1000 milliLiter(s) IV Continuous <Continuous>  dextrose 50% Injectable 12.5 Gram(s) IV Push once  dextrose 50% Injectable 25 Gram(s) IV Push once  dextrose 50% Injectable 25 Gram(s) IV Push once  escitalopram 5 milliGRAM(s) Oral daily  insulin glargine Injectable (LANTUS) 10 Unit(s) SubCutaneous at bedtime  insulin lispro (HumaLOG) corrective regimen sliding scale   SubCutaneous Before meals and at bedtime  lacosamide Solution 50 milliGRAM(s) Oral two times a day  levETIRAcetam  Solution 125 milliGRAM(s) Oral <User Schedule>  levETIRAcetam  Solution 250 milliGRAM(s) Oral two times a day  levothyroxine 50 MICROGram(s) Oral daily  melatonin 3 milliGRAM(s) Oral at bedtime  Nephro-macie 1 Tablet(s) Oral daily  pantoprazole    Tablet 40 milliGRAM(s) Oral before breakfast  polyethylene glycol 3350 17 Gram(s) Oral daily  senna 1 Tablet(s) Oral at bedtime  traZODone 50 milliGRAM(s) Oral at bedtime    PRN Inpatient Medications  dextrose 40% Gel 15 Gram(s) Oral once PRN  glucagon  Injectable 1 milliGRAM(s) IntraMuscular once PRN  guaiFENesin   Syrup  (Sugar-Free) 100 milliGRAM(s) Oral every 6 hours PRN      REVIEW OF SYSTEMS  --------------------------------------------------------------------------------  General: no fever  CVS: no chest pain  RESP: no sob, no cough  ABD: no abdominal pain  : no dysuria,  MSK: no edema     VITALS/PHYSICAL EXAM  --------------------------------------------------------------------------------  T(C): 37 (02-25-20 @ 04:39), Max: 37.3 (02-24-20 @ 21:06)  HR: 67 (02-25-20 @ 04:39) (62 - 70)  BP: 108/61 (02-25-20 @ 04:39) (108/61 - 168/79)  RR: 18 (02-25-20 @ 04:39) (16 - 18)  SpO2: 97% (02-25-20 @ 04:39) (94% - 99%)  Wt(kg): --        02-24-20 @ 07:01  -  02-25-20 @ 07:00  --------------------------------------------------------  IN: 240 mL / OUT: 250 mL / NET: -10 mL    02-25-20 @ 07:01  -  02-25-20 @ 08:21  --------------------------------------------------------  IN: 0 mL / OUT: 100 mL / NET: -100 mL      Physical Exam:  	Gen: NAD  	HEENT: MMM  	Pulm: CTA B/L  	CV: S1S2  	Abd: Soft, +BS  	Ext: No LE edema B/L                      Neuro: Awake alert  	Skin: Warm and Dry   	Vascular access: tunneled HD catheter     LABS/STUDIES  --------------------------------------------------------------------------------              8.0    7.19  >-----------<  258      [02-25-20 @ 06:04]              25.5     131  |  93  |  41  ----------------------------<  176      [02-25-20 @ 06:04]  4.0   |  26  |  4.67        Ca     8.3     [02-25-20 @ 06:04]      Mg     2.1     [02-24-20 @ 09:16]      Phos  3.7     [02-24-20 @ 09:16]        PTT: 82.4       [02-23-20 @ 08:41]      Creatinine Trend:  SCr 4.67 [02-25 @ 06:04]  SCr 6.75 [02-24 @ 09:16]  SCr 4.84 [02-23 @ 07:18]  SCr 2.98 [02-22 @ 04:45]  SCr 4.69 [02-21 @ 05:09]        Iron 45, TIBC 145, %sat 31      [12-26-19 @ 23:24]  Ferritin 1369      [12-26-19 @ 23:24]  PTH -- (Ca 8.4)      [02-21-20 @ 08:54]   93  PTH -- (Ca 6.9)      [06-05-19 @ 08:14]   562  HbA1c 6.0      [02-21-20 @ 08:53]  TSH 6.22      [01-03-20 @ 10:03]  Lipid: chol 134, , HDL 56, LDL 52      [12-15-19 @ 10:56]

## 2020-02-25 NOTE — PROVIDER CONTACT NOTE (OTHER) - RECOMMENDATIONS
MD aware of BP. Since pt has been running in the 150's to 170's all day, MD said no interventions for now.

## 2020-02-25 NOTE — PROGRESS NOTE ADULT - SUBJECTIVE AND OBJECTIVE BOX
INTERVAL HPI/OVERNIGHT EVENTS:    Patient seen and examined  family at bedside  + brown stool today    MEDICATIONS  (STANDING):  amLODIPine   Tablet 5 milliGRAM(s) Oral daily  apixaban 5 milliGRAM(s) Enteral Tube every 12 hours  atorvastatin 80 milliGRAM(s) Oral at bedtime  carvedilol 6.25 milliGRAM(s) Oral every 12 hours  clopidogrel Tablet 75 milliGRAM(s) Oral daily  dextrose 5%. 1000 milliLiter(s) (50 mL/Hr) IV Continuous <Continuous>  dextrose 50% Injectable 12.5 Gram(s) IV Push once  dextrose 50% Injectable 25 Gram(s) IV Push once  dextrose 50% Injectable 25 Gram(s) IV Push once  escitalopram 5 milliGRAM(s) Oral daily  insulin glargine Injectable (LANTUS) 10 Unit(s) SubCutaneous at bedtime  insulin lispro (HumaLOG) corrective regimen sliding scale   SubCutaneous Before meals and at bedtime  lacosamide Solution 50 milliGRAM(s) Oral two times a day  levETIRAcetam  Solution 125 milliGRAM(s) Oral <User Schedule>  levETIRAcetam  Solution 250 milliGRAM(s) Oral two times a day  levothyroxine 50 MICROGram(s) Oral daily  melatonin 3 milliGRAM(s) Oral at bedtime  Nephro-macie 1 Tablet(s) Oral daily  pantoprazole    Tablet 40 milliGRAM(s) Oral before breakfast  polyethylene glycol 3350 17 Gram(s) Oral daily  senna 1 Tablet(s) Oral at bedtime  traZODone 50 milliGRAM(s) Oral at bedtime    MEDICATIONS  (PRN):  dextrose 40% Gel 15 Gram(s) Oral once PRN Blood Glucose LESS THAN 70 milliGRAM(s)/deciliter  glucagon  Injectable 1 milliGRAM(s) IntraMuscular once PRN Glucose LESS THAN 70 milligrams/deciliter  guaiFENesin   Syrup  (Sugar-Free) 100 milliGRAM(s) Oral every 6 hours PRN Cough      Allergies    No Known Allergies    Intolerances        Review of Systems:    General:  No wt loss, fevers, chills, night sweats,fatigue,   Eyes:  Good vision, no reported pain  ENT:  No sore throat, pain, runny nose, dysphagia  CV:  No pain, palpitations, hypo/hypertension  Resp:  No dyspnea, cough, tachypnea, wheezing  GI:  No pain, No nausea, No vomiting, No diarrhea, No constipation, No weight loss, No fever, No pruritis, No rectal bleeding, No melena, No dysphagia  :  No pain, bleeding, incontinence, nocturia  Muscle:  No pain, weakness  Neuro:  No weakness, tingling, memory problems  Psych:  No fatigue, insomnia, mood problems, depression  Endocrine:  No polyuria, polydypsia, cold/heat intolerance  Heme:  No petechiae, ecchymosis, easy bruisability  Skin:  No rash, tattoos, scars, edema      Vital Signs Last 24 Hrs  T(C): 36.5 (25 Feb 2020 09:56), Max: 37.3 (24 Feb 2020 21:06)  T(F): 97.7 (25 Feb 2020 09:56), Max: 99.1 (24 Feb 2020 21:06)  HR: 64 (25 Feb 2020 09:56) (62 - 70)  BP: 170/84 (25 Feb 2020 09:56) (108/61 - 170/84)  BP(mean): --  RR: 18 (25 Feb 2020 09:56) (16 - 18)  SpO2: 97% (25 Feb 2020 09:56) (94% - 99%)    PHYSICAL EXAM:    Constitutional: NAD, well-developed  HEENT: EOMI, throat clear  Neck: No LAD, supple  Respiratory: CTA and P  Cardiovascular: S1 and S2, RRR, no M  Gastrointestinal: BS+, soft, NT/ND, neg HSM,  Extremities: No peripheral edema, neg clubing, cyanosis  Vascular: 2+ peripheral pulses  Neurological: A/O x 3, no focal deficits  Psychiatric: Normal mood, normal affect  Skin: No rashes      LABS:                        8.0    7.19  )-----------( 258      ( 25 Feb 2020 06:04 )             25.5     02-25    131<L>  |  93<L>  |  41<H>  ----------------------------<  176<H>  4.0   |  26  |  4.67<H>    Ca    8.3<L>      25 Feb 2020 06:04  Phos  3.7     02-24  Mg     2.1     02-24            RADIOLOGY & ADDITIONAL TESTS: INTERVAL HPI/OVERNIGHT EVENTS:    s/p  upper gastrointestinal endoscopy with Normal esophagus.  - Intact gastrostomy with a patent G-tube present characterized by healthy  appearing mucosa.  - Normal examined duodenum.    Patient seen and examined  family at bedside  + brown stool today    MEDICATIONS  (STANDING):  amLODIPine   Tablet 5 milliGRAM(s) Oral daily  apixaban 5 milliGRAM(s) Enteral Tube every 12 hours  atorvastatin 80 milliGRAM(s) Oral at bedtime  carvedilol 6.25 milliGRAM(s) Oral every 12 hours  clopidogrel Tablet 75 milliGRAM(s) Oral daily  dextrose 5%. 1000 milliLiter(s) (50 mL/Hr) IV Continuous <Continuous>  dextrose 50% Injectable 12.5 Gram(s) IV Push once  dextrose 50% Injectable 25 Gram(s) IV Push once  dextrose 50% Injectable 25 Gram(s) IV Push once  escitalopram 5 milliGRAM(s) Oral daily  insulin glargine Injectable (LANTUS) 10 Unit(s) SubCutaneous at bedtime  insulin lispro (HumaLOG) corrective regimen sliding scale   SubCutaneous Before meals and at bedtime  lacosamide Solution 50 milliGRAM(s) Oral two times a day  levETIRAcetam  Solution 125 milliGRAM(s) Oral <User Schedule>  levETIRAcetam  Solution 250 milliGRAM(s) Oral two times a day  levothyroxine 50 MICROGram(s) Oral daily  melatonin 3 milliGRAM(s) Oral at bedtime  Nephro-macie 1 Tablet(s) Oral daily  pantoprazole    Tablet 40 milliGRAM(s) Oral before breakfast  polyethylene glycol 3350 17 Gram(s) Oral daily  senna 1 Tablet(s) Oral at bedtime  traZODone 50 milliGRAM(s) Oral at bedtime    MEDICATIONS  (PRN):  dextrose 40% Gel 15 Gram(s) Oral once PRN Blood Glucose LESS THAN 70 milliGRAM(s)/deciliter  glucagon  Injectable 1 milliGRAM(s) IntraMuscular once PRN Glucose LESS THAN 70 milligrams/deciliter  guaiFENesin   Syrup  (Sugar-Free) 100 milliGRAM(s) Oral every 6 hours PRN Cough      Allergies    No Known Allergies    Intolerances        Review of Systems:    General:  No wt loss, fevers, chills, night sweats,fatigue,   Eyes:  Good vision, no reported pain  ENT:  No sore throat, pain, runny nose, dysphagia  CV:  No pain, palpitations, hypo/hypertension  Resp:  No dyspnea, cough, tachypnea, wheezing  GI:  No pain, No nausea, No vomiting, No diarrhea, No constipation, No weight loss, No fever, No pruritis, No rectal bleeding, No melena, No dysphagia  :  No pain, bleeding, incontinence, nocturia  Muscle:  No pain, weakness  Neuro:  No weakness, tingling, memory problems  Psych:  No fatigue, insomnia, mood problems, depression  Endocrine:  No polyuria, polydypsia, cold/heat intolerance  Heme:  No petechiae, ecchymosis, easy bruisability  Skin:  No rash, tattoos, scars, edema      Vital Signs Last 24 Hrs  T(C): 36.5 (25 Feb 2020 09:56), Max: 37.3 (24 Feb 2020 21:06)  T(F): 97.7 (25 Feb 2020 09:56), Max: 99.1 (24 Feb 2020 21:06)  HR: 64 (25 Feb 2020 09:56) (62 - 70)  BP: 170/84 (25 Feb 2020 09:56) (108/61 - 170/84)  BP(mean): --  RR: 18 (25 Feb 2020 09:56) (16 - 18)  SpO2: 97% (25 Feb 2020 09:56) (94% - 99%)    PHYSICAL EXAM:    Constitutional: NAD, well-developed  HEENT: EOMI, throat clear  Neck: No LAD, supple  Respiratory: CTA and P  Cardiovascular: S1 and S2, RRR, no M  Gastrointestinal: BS+, soft, NT/ND, neg HSM,  Extremities: No peripheral edema, neg clubing, cyanosis  Vascular: 2+ peripheral pulses  Neurological: A/O x 3, no focal deficits  Psychiatric: Normal mood, normal affect  Skin: No rashes      LABS:                        8.0    7.19  )-----------( 258      ( 25 Feb 2020 06:04 )             25.5     02-25    131<L>  |  93<L>  |  41<H>  ----------------------------<  176<H>  4.0   |  26  |  4.67<H>    Ca    8.3<L>      25 Feb 2020 06:04  Phos  3.7     02-24  Mg     2.1     02-24            RADIOLOGY & ADDITIONAL TESTS:

## 2020-02-25 NOTE — SWALLOW BEDSIDE ASSESSMENT ADULT - DIET PRIOR TO ADMI
Per son at bedside: soft texture diet, per transfer form [facility] "dysphagia 2 with thin liquids. Pleasure feeds"
Per son at bedside: soft texture diet, per transfer form [facility] "dysphagia 2 with thin liquids. Pleasure feeds"

## 2020-02-25 NOTE — SWALLOW BEDSIDE ASSESSMENT ADULT - ADDITIONAL RECOMMENDATIONS
Maintain good oral hygiene  Service will f/u to assess swallow function as clinically appropriate
maintain good oral hygiene  Monitor for s/s aspiration/laryngeal penetration. If noted:  D/C p.o. intake, provide non-oral nutrition/hydration/meds, and contact this service @ [phone]

## 2020-02-25 NOTE — PROGRESS NOTE ADULT - PROBLEM SELECTOR PLAN 2
Likely 2/2 upper GIB. Presenting to ED with hgb 7.1, was 6.6 at HD. EGD in the past showing gastritis; currently not taking PPI or H2-blocker at rehab.  - per renal, transfuse hgb <7.5. S/p 2 unit PRBC, as above  - protonix 40 PO QD  - maintain active type and screen, 2 large IV bores  - plan as above Likely 2/2 upper GIB. Presenting to ED with hgb 7.1, was 6.6 at HD. EGD in the past showing gastritis; currently not taking PPI or H2-blocker at rehab.  - per renal, transfuse hgb <7.5. S/p 2 unit PRBC, as above  - Protonix 40 PO QD  - maintain active type and screen, 2 large IV bores  - plan as above

## 2020-02-25 NOTE — SWALLOW BEDSIDE ASSESSMENT ADULT - SLP GENERAL OBSERVATIONS
Pt awake, upon entry to room pt walking back to bed with walker and PCA (appeared unsteady on feet). Pt able to communicate needs and follow directions for exam. + Baseline cough, productive at times, elevated RR and subjective c/o SOB noted (RN: Sia alerted and in room to assess pt). SPo2 was 96% on RA. MD: Maritza alerted of above findings.
Pt encountered OOB in chair, AA&Ox4. Pt able to follow mx-step directives and answer yes/no questions. Pt able to express wants/needs and c/o pain. Pt reports tolerating PO without any difficulty. Pt and wife inquire about PEG removal. Contacted team re: PEG. Pt reports good bowel movements without evidence of bleeding.

## 2020-02-25 NOTE — SWALLOW BEDSIDE ASSESSMENT ADULT - COMMENTS
2/22: ***Case d/w MD: Maritza pt cleared for PO intake for evaluation purposes. 2/22: ***Case d/w MD: Maritza, pt cleared for PO intake for evaluation purposes.  EGD 2/24: Findings: The examined esophagus was normal. There was evidence of an intact gastrostomy with a patent G-tube present in the gastric body. This was characterized by healthy appearing mucosa. The examined duodenum was normal.                                                                     Impression:  Normal esophagus. Intact gastrostomy with a patent G-tube present characterized by healthy appearing mucosa. Normal examined duodenum. No specimens collected. Rec: [facility]. Resume previous diet. Use Protonix (pantoprazole) 40 mg PO daily. No objection to resume anticoagualtion 2/22: ***Case d/w MD: Maritza pt cleared for PO intake for evaluation purposes.  EGD 2/24: Findings: The examined esophagus was normal. There was evidence of an intact gastrostomy with a patent G-tube present in the gastric body. This was characterized by healthy appearing mucosa. The examined duodenum was normal.                                                                     Impression:  Normal esophagus. Intact gastrostomy with a patent G-tube present characterized by healthy appearing mucosa. Normal examined duodenum. No specimens collected. Rec: [facility]. Resume previous diet. Use Protonix (pantoprazole) 40 mg PO daily. No objection to resume anticoagualtion.  Received MBS report from [facility] on 2/7/20: Pt presents with functional oral, pharyngeal swallow. No penetration/aspiration occurred during evaluation. Mild pharyngeal residue was observed with solid textures in valleculae space. Liquid washdown was ineffective in clearing however multiple swallows ray an effective strategy to clear residue. Rec: 1)Mechanical soft diet 2)Pt instructed to use repeat swallows per spoon/forkful of solid food 3) Small bites, chew food thoroughly

## 2020-02-25 NOTE — SWALLOW BEDSIDE ASSESSMENT ADULT - ORAL PREPARATORY PHASE
Within functional limits decreased mastication likely due to partially missing U/L dentition/Decreased mastication ability

## 2020-02-25 NOTE — PROVIDER CONTACT NOTE (OTHER) - ASSESSMENT
pt's BP is 162/74, HR 75, RR 18, o2 99% on room air. pt denies any headache or chest pain.
pt  morning blood glucose 69.pt is asymptomatic
pt A&Ox4 with slurred speech d/t past CVA. No c/o pain, no s/s of distress. No s/s of active bleeding at this time. /71, all other VSS. 2 Keppra orders entered, 125mg on HD days and 250mg after HD session.
pt resting comfortably in bed, no c/o pain, no s/s of bleeding. All VSS at this time.

## 2020-02-26 LAB
ANION GAP SERPL CALC-SCNC: 15 MMOL/L — SIGNIFICANT CHANGE UP (ref 5–17)
BUN SERPL-MCNC: 65 MG/DL — HIGH (ref 7–23)
CALCIUM SERPL-MCNC: 8.8 MG/DL — SIGNIFICANT CHANGE UP (ref 8.4–10.5)
CHLORIDE SERPL-SCNC: 92 MMOL/L — LOW (ref 96–108)
CO2 SERPL-SCNC: 26 MMOL/L — SIGNIFICANT CHANGE UP (ref 22–31)
CREAT SERPL-MCNC: 6.2 MG/DL — HIGH (ref 0.5–1.3)
GLUCOSE BLDC GLUCOMTR-MCNC: 130 MG/DL — HIGH (ref 70–99)
GLUCOSE BLDC GLUCOMTR-MCNC: 209 MG/DL — HIGH (ref 70–99)
GLUCOSE BLDC GLUCOMTR-MCNC: 214 MG/DL — HIGH (ref 70–99)
GLUCOSE BLDC GLUCOMTR-MCNC: 228 MG/DL — HIGH (ref 70–99)
GLUCOSE BLDC GLUCOMTR-MCNC: 284 MG/DL — HIGH (ref 70–99)
GLUCOSE SERPL-MCNC: 213 MG/DL — HIGH (ref 70–99)
HCT VFR BLD CALC: 26.7 % — LOW (ref 39–50)
HGB BLD-MCNC: 8.3 G/DL — LOW (ref 13–17)
MCHC RBC-ENTMCNC: 29.3 PG — SIGNIFICANT CHANGE UP (ref 27–34)
MCHC RBC-ENTMCNC: 31.1 GM/DL — LOW (ref 32–36)
MCV RBC AUTO: 94.3 FL — SIGNIFICANT CHANGE UP (ref 80–100)
NRBC # BLD: 0 /100 WBCS — SIGNIFICANT CHANGE UP (ref 0–0)
PHOSPHATE SERPL-MCNC: 3.3 MG/DL — SIGNIFICANT CHANGE UP (ref 2.5–4.5)
PLATELET # BLD AUTO: 276 K/UL — SIGNIFICANT CHANGE UP (ref 150–400)
POTASSIUM SERPL-MCNC: 4.4 MMOL/L — SIGNIFICANT CHANGE UP (ref 3.5–5.3)
POTASSIUM SERPL-SCNC: 4.4 MMOL/L — SIGNIFICANT CHANGE UP (ref 3.5–5.3)
RBC # BLD: 2.83 M/UL — LOW (ref 4.2–5.8)
RBC # FLD: 15.5 % — HIGH (ref 10.3–14.5)
SODIUM SERPL-SCNC: 133 MMOL/L — LOW (ref 135–145)
WBC # BLD: 5.91 K/UL — SIGNIFICANT CHANGE UP (ref 3.8–10.5)
WBC # FLD AUTO: 5.91 K/UL — SIGNIFICANT CHANGE UP (ref 3.8–10.5)

## 2020-02-26 PROCEDURE — 99233 SBSQ HOSP IP/OBS HIGH 50: CPT | Mod: GC

## 2020-02-26 RX ORDER — ERYTHROPOIETIN 10000 [IU]/ML
10000 INJECTION, SOLUTION INTRAVENOUS; SUBCUTANEOUS
Refills: 0 | Status: DISCONTINUED | OUTPATIENT
Start: 2020-02-26 | End: 2020-02-27

## 2020-02-26 RX ORDER — INSULIN GLARGINE 100 [IU]/ML
12 INJECTION, SOLUTION SUBCUTANEOUS AT BEDTIME
Refills: 0 | Status: DISCONTINUED | OUTPATIENT
Start: 2020-02-26 | End: 2020-02-27

## 2020-02-26 RX ORDER — CARVEDILOL PHOSPHATE 80 MG/1
6.25 CAPSULE, EXTENDED RELEASE ORAL EVERY 12 HOURS
Refills: 0 | Status: DISCONTINUED | OUTPATIENT
Start: 2020-02-26 | End: 2020-02-27

## 2020-02-26 RX ADMIN — Medication 3 MILLIGRAM(S): at 21:09

## 2020-02-26 RX ADMIN — LACOSAMIDE 50 MILLIGRAM(S): 50 TABLET ORAL at 05:06

## 2020-02-26 RX ADMIN — Medication 0: at 18:20

## 2020-02-26 RX ADMIN — Medication 2: at 21:09

## 2020-02-26 RX ADMIN — Medication 50 MILLIGRAM(S): at 21:09

## 2020-02-26 RX ADMIN — ESCITALOPRAM OXALATE 5 MILLIGRAM(S): 10 TABLET, FILM COATED ORAL at 11:33

## 2020-02-26 RX ADMIN — Medication 2: at 08:36

## 2020-02-26 RX ADMIN — Medication 1 TABLET(S): at 18:19

## 2020-02-26 RX ADMIN — APIXABAN 5 MILLIGRAM(S): 2.5 TABLET, FILM COATED ORAL at 18:20

## 2020-02-26 RX ADMIN — SENNA PLUS 1 TABLET(S): 8.6 TABLET ORAL at 21:10

## 2020-02-26 RX ADMIN — PANTOPRAZOLE SODIUM 40 MILLIGRAM(S): 20 TABLET, DELAYED RELEASE ORAL at 05:05

## 2020-02-26 RX ADMIN — LACOSAMIDE 50 MILLIGRAM(S): 50 TABLET ORAL at 18:22

## 2020-02-26 RX ADMIN — Medication 3: at 13:35

## 2020-02-26 RX ADMIN — Medication 100 MILLIGRAM(S): at 05:18

## 2020-02-26 RX ADMIN — INSULIN GLARGINE 12 UNIT(S): 100 INJECTION, SOLUTION SUBCUTANEOUS at 21:52

## 2020-02-26 RX ADMIN — LEVETIRACETAM 250 MILLIGRAM(S): 250 TABLET, FILM COATED ORAL at 18:21

## 2020-02-26 RX ADMIN — POLYETHYLENE GLYCOL 3350 17 GRAM(S): 17 POWDER, FOR SOLUTION ORAL at 11:36

## 2020-02-26 RX ADMIN — APIXABAN 5 MILLIGRAM(S): 2.5 TABLET, FILM COATED ORAL at 05:05

## 2020-02-26 RX ADMIN — ATORVASTATIN CALCIUM 80 MILLIGRAM(S): 80 TABLET, FILM COATED ORAL at 21:09

## 2020-02-26 RX ADMIN — LEVETIRACETAM 125 MILLIGRAM(S): 250 TABLET, FILM COATED ORAL at 06:32

## 2020-02-26 RX ADMIN — CLOPIDOGREL BISULFATE 75 MILLIGRAM(S): 75 TABLET, FILM COATED ORAL at 11:36

## 2020-02-26 RX ADMIN — CARVEDILOL PHOSPHATE 6.25 MILLIGRAM(S): 80 CAPSULE, EXTENDED RELEASE ORAL at 18:20

## 2020-02-26 RX ADMIN — AMLODIPINE BESYLATE 5 MILLIGRAM(S): 2.5 TABLET ORAL at 05:05

## 2020-02-26 RX ADMIN — Medication 50 MICROGRAM(S): at 05:05

## 2020-02-26 RX ADMIN — CARVEDILOL PHOSPHATE 6.25 MILLIGRAM(S): 80 CAPSULE, EXTENDED RELEASE ORAL at 05:06

## 2020-02-26 RX ADMIN — LEVETIRACETAM 250 MILLIGRAM(S): 250 TABLET, FILM COATED ORAL at 08:37

## 2020-02-26 RX ADMIN — ERYTHROPOIETIN 10000 UNIT(S): 10000 INJECTION, SOLUTION INTRAVENOUS; SUBCUTANEOUS at 14:09

## 2020-02-26 NOTE — PROGRESS NOTE ADULT - ATTENDING COMMENTS
Hg stable. Cont Plavix, Eliquis.  HD today.  D/C back to rehab 2/27.
Pt seen and examined at bedside in the presence of pt's wife and son  No acute events overnight.  Pt denies cp, palpitations, sob, abd pain, n/v, fever, chills  Pt with recent CVA with PEG placement, presented with Anemia secondary to GI Bleed.   s/p 2u prbc. Hb stable at 8.3  Will continue to hold off on Heparin gtt as noted to have Bloody stools  GI recs noted; Anticipate EGD/Colonoscopy tomorrow  NPO after midnight   Pt with PEG although tolerating PO. Will discuss with GI regarding PEG removal  Uncontrolled HTN; Continue Coreg at current dose. Start Amlodipine 5mg daily. Monitor vitals
Cont to monitor H/H while resumed on Plavix and Eliquis  If stable and no signs of bleeding will proceed with d/c back to rehab  HD per renal
Patient seen and examined. 64 year old male with history of CABG s/p CARLA (6/2019, 2009) carotid stenosis, ESRD On MWF HD, CVA (12/2019) presenting with acute blood loss anemia secondary to GIB.     1. GIB: Started on heparin drip on presentation considering CHADSVASC, recent CVA; however Hgb dropped 6.6 on follow up labs. We held heparin. Episode of Melena 2/21. I spoke with Dr. Acosta, barring any further episodes of melena, we will plan to hold heparin drip x 24 hours (which should be complete this afternoon). If repeat Hgb 2/22 PM is stable, will resume heparin drip. Per cardiology, if Hgb remains stable, will resume anti-platelet after. GI without plans to scope at this time, however, may need to re-evaluate with any clinical changes.     Rest per resident note
Patient seen and examined. 64 year old male with history of CABG s/p CARLA (6/2019, 2009) carotid stenosis, ESRD On MWF HD, CVA (12/2019) presenting with acute blood loss anemia secondary to GIB.     1. GIB: Likely upper, and based on history potentially has resolved (one episode prior to admission). Started on heparin drip on presentation considering CHADSVASC, recent CVA; however Hgb dropped 6.6 on follow up labs. We held heparin this morning. He has no clinical signs of bleeding, no melena, hematochezia. We consulted cardiology. Plan to resume heparin if hemoglobin stabilizes. Will resume anti-platelet on Sunday. GI without plans to scope at this time.    Rest per resident note
EGD performed today- no signs of bleed identified.  Resume Plavix and Eliquis and cont to monitor blood counts.  HD per renal

## 2020-02-26 NOTE — PROGRESS NOTE ADULT - PROBLEM SELECTOR PLAN 2
Likely 2/2 upper GIB. Presenting to ED with hgb 7.1, was 6.6 at HD. EGD in the past showing gastritis; currently not taking PPI or H2-blocker at rehab.  - per renal, transfuse hgb <7.5. S/p 2 unit PRBC, as above  - Protonix 40 PO QD  - maintain active type and screen, 2 large IV bores  - plan as above

## 2020-02-26 NOTE — PROGRESS NOTE ADULT - PROBLEM SELECTOR PLAN 3
History of atrial fibrillation with [id number] of 6. Was taking Eliquis at home for AC  - continue Eliquis and Plavix

## 2020-02-26 NOTE — PROGRESS NOTE ADULT - PROBLEM SELECTOR PLAN 5
BP elevated in the ED to 190s.   - c/w Coreg 6.25 mg BID and amlodipine 5mg QD  - if BP remains elevated, can consider 5mg IV hydralazine

## 2020-02-26 NOTE — PROGRESS NOTE ADULT - PROBLEM SELECTOR PLAN 4
PMHx CVA c/b status epilepticus s/p intubation and PEG in 12/2019  - c/w Keppra and Vimpat for prophylaxis against seizure  - continue Eliquis and Plavix

## 2020-02-26 NOTE — PROGRESS NOTE ADULT - PROBLEM SELECTOR PLAN 8
CAD  - S/p stents in 2009 and 6/2019.   - c/w Plavix 75mg PO QD  - c/w atorvastatin 80mg once daily    Hypothyroidism  - c/w Levothyroxine 50mcg once daily

## 2020-02-26 NOTE — PROGRESS NOTE ADULT - SUBJECTIVE AND OBJECTIVE BOX
PROGRESS NOTE:     Patient is a 64y old  Male who presents with a chief complaint of Anemia, concern for GIB (25 Feb 2020 11:10)      SUBJECTIVE / OVERNIGHT EVENTS: Pt seen and examined. No acute overnight events. Pt reported no fever, chills, CP, SOB, abdominal pain, N/V, urinary or bowel issues, or new joint aches.     ADDITIONAL REVIEW OF SYSTEMS: Otherwise negative    MEDICATIONS  (STANDING):  amLODIPine   Tablet 5 milliGRAM(s) Oral daily  apixaban 5 milliGRAM(s) Enteral Tube every 12 hours  atorvastatin 80 milliGRAM(s) Oral at bedtime  carvedilol 6.25 milliGRAM(s) Oral every 12 hours  clopidogrel Tablet 75 milliGRAM(s) Oral daily  dextrose 5%. 1000 milliLiter(s) (50 mL/Hr) IV Continuous <Continuous>  dextrose 50% Injectable 12.5 Gram(s) IV Push once  dextrose 50% Injectable 25 Gram(s) IV Push once  dextrose 50% Injectable 25 Gram(s) IV Push once  escitalopram 5 milliGRAM(s) Oral daily  insulin glargine Injectable (LANTUS) 10 Unit(s) SubCutaneous at bedtime  insulin lispro (HumaLOG) corrective regimen sliding scale   SubCutaneous Before meals and at bedtime  lacosamide Solution 50 milliGRAM(s) Oral two times a day  levETIRAcetam  Solution 125 milliGRAM(s) Oral <User Schedule>  levETIRAcetam  Solution 250 milliGRAM(s) Oral two times a day  levothyroxine 50 MICROGram(s) Oral daily  melatonin 3 milliGRAM(s) Oral at bedtime  Nephro-macie 1 Tablet(s) Oral daily  pantoprazole    Tablet 40 milliGRAM(s) Oral before breakfast  polyethylene glycol 3350 17 Gram(s) Oral daily  senna 1 Tablet(s) Oral at bedtime  traZODone 50 milliGRAM(s) Oral at bedtime    MEDICATIONS  (PRN):  dextrose 40% Gel 15 Gram(s) Oral once PRN Blood Glucose LESS THAN 70 milliGRAM(s)/deciliter  glucagon  Injectable 1 milliGRAM(s) IntraMuscular once PRN Glucose LESS THAN 70 milligrams/deciliter  guaiFENesin   Syrup  (Sugar-Free) 100 milliGRAM(s) Oral every 6 hours PRN Cough      CAPILLARY BLOOD GLUCOSE      POCT Blood Glucose.: 227 mg/dL (25 Feb 2020 22:15)  POCT Blood Glucose.: 145 mg/dL (25 Feb 2020 18:00)  POCT Blood Glucose.: 275 mg/dL (25 Feb 2020 12:37)  POCT Blood Glucose.: 200 mg/dL (25 Feb 2020 08:53)    I&O's Summary    25 Feb 2020 07:01  -  26 Feb 2020 07:00  --------------------------------------------------------  IN: 1050 mL / OUT: 701 mL / NET: 349 mL        PHYSICAL EXAM:  Vital Signs Last 24 Hrs  T(C): 37 (26 Feb 2020 04:52), Max: 37 (26 Feb 2020 04:52)  T(F): 98.6 (26 Feb 2020 04:52), Max: 98.6 (26 Feb 2020 04:52)  HR: 69 (26 Feb 2020 04:52) (64 - 75)  BP: 166/81 (26 Feb 2020 04:52) (142/69 - 170/84)  BP(mean): --  RR: 18 (26 Feb 2020 04:52) (18 - 18)  SpO2: 99% (26 Feb 2020 04:52) (97% - 100%)    CONSTITUTIONAL: NAD, well-developed  RESPIRATORY: Normal respiratory effort; lungs are clear to auscultation bilaterally  CARDIOVASCULAR: Regular rate and rhythm, normal S1 and S2, no murmur/rub/gallop  ABDOMEN: Nontender to palpation, normoactive bowel sounds, no rebound/guarding; No hepatosplenomegaly  EXTREMITIES: No lower extremity edema; Peripheral pulses are 2+ bilaterally  MUSCLOSKELETAL: no clubbing or cyanosis of digits; no joint swelling or tenderness to palpation  PSYCH: A+O to person, place, and time; affect appropriate    LABS:                        8.0    7.19  )-----------( 258      ( 25 Feb 2020 06:04 )             25.5     02-25    131<L>  |  93<L>  |  41<H>  ----------------------------<  176<H>  4.0   |  26  |  4.67<H>    Ca    8.3<L>      25 Feb 2020 06:04  Phos  3.7     02-24  Mg     2.1     02-24      RADIOLOGY & ADDITIONAL TESTS:  Results Reviewed:   Imaging Personally Reviewed:  Electrocardiogram Personally Reviewed:    COORDINATION OF CARE:  Care Discussed with Consultants/Other Providers [Y/N]:  Prior or Outpatient Records Reviewed [Y/N]:

## 2020-02-26 NOTE — PROGRESS NOTE ADULT - ASSESSMENT
Patient is a 65 yo M w/ PMH HTN, T2DM, CAD s/p stent 2009, 2019, carotid stenosis, ESRD on HD (MWF), CVA (12/2019) s/p tpa (on plavix) c/b dysphagia s/p PEG, Atrial fibrillation (on eliquis) who presents from rehab facility w/ a hgb level of 6.6. He endorsed having a BM w/ dark stools on Saturday, 2/15 but has resolved as per son.     UGIB   Acute blood loss anemia    - s/p  upper gastrointestinal endoscopy 2/24 with normal esophagus.  - Intact gastrostomy with a patent G-tube present characterized by healthy appearing mucosa.  - Normal examined duodenum.  - No specimens collected  - Resume previous diet.  - Use Protonix (pantoprazole) 40 mg PO daily  - No objection to AC                                                                                                        - EGD with PEG placement 12/27. EGD with non-bleeding gastric ulcers    - please maintain active type and screen  - monitor cbc, transfuse prn  - monitor vitals  - monitor stool color  - continue bowel regimen   - will follow

## 2020-02-26 NOTE — PROGRESS NOTE ADULT - SUBJECTIVE AND OBJECTIVE BOX
[facility] [place] [facility]   MD NINA MASON MD RUORU WONG, PA    TEL:  OFFICE: [phone]  DR HSU CELL: [phone]  RAS MORELOS CELL: [phone]  DR. MARQUEZ CELL: [phone]  DR. ALEX CELL: [phone]    FROM 5 PM - 7 AM PLEASE CALL ANSWERING SERVICE: [phone]    RENAL FOLLOW UP NOTE  --------------------------------------------------------------------------------  HPI:      Pt seen and examined at bedside.   Denies SOB, chest pain     PAST HISTORY  --------------------------------------------------------------------------------  No significant changes to PMH, PSH, FHx, SHx, unless otherwise noted    ALLERGIES & MEDICATIONS  --------------------------------------------------------------------------------  Allergies    No Known Allergies    Intolerances      Standing Inpatient Medications  amLODIPine   Tablet 5 milliGRAM(s) Oral daily  apixaban 5 milliGRAM(s) Enteral Tube every 12 hours  atorvastatin 80 milliGRAM(s) Oral at bedtime  clopidogrel Tablet 75 milliGRAM(s) Oral daily  dextrose 5%. 1000 milliLiter(s) IV Continuous <Continuous>  dextrose 50% Injectable 12.5 Gram(s) IV Push once  dextrose 50% Injectable 25 Gram(s) IV Push once  dextrose 50% Injectable 25 Gram(s) IV Push once  epoetin cortney Injectable 56765 Unit(s) IV Push <User Schedule>  escitalopram 5 milliGRAM(s) Oral daily  insulin glargine Injectable (LANTUS) 10 Unit(s) SubCutaneous at bedtime  insulin lispro (HumaLOG) corrective regimen sliding scale   SubCutaneous Before meals and at bedtime  lacosamide Solution 50 milliGRAM(s) Oral two times a day  levETIRAcetam  Solution 125 milliGRAM(s) Oral <User Schedule>  levETIRAcetam  Solution 250 milliGRAM(s) Oral two times a day  levothyroxine 50 MICROGram(s) Oral daily  melatonin 3 milliGRAM(s) Oral at bedtime  Nephro-macie 1 Tablet(s) Oral daily  pantoprazole    Tablet 40 milliGRAM(s) Oral before breakfast  polyethylene glycol 3350 17 Gram(s) Oral daily  senna 1 Tablet(s) Oral at bedtime  traZODone 50 milliGRAM(s) Oral at bedtime    PRN Inpatient Medications  dextrose 40% Gel 15 Gram(s) Oral once PRN  glucagon  Injectable 1 milliGRAM(s) IntraMuscular once PRN  guaiFENesin   Syrup  (Sugar-Free) 100 milliGRAM(s) Oral every 6 hours PRN      REVIEW OF SYSTEMS  --------------------------------------------------------------------------------  General: no fever  CVS: no chest pain  RESP: no sob, no cough  ABD: no abdominal pain  MSK: no edema     VITALS/PHYSICAL EXAM  --------------------------------------------------------------------------------  T(C): 37 (02-26-20 @ 04:52), Max: 37 (02-26-20 @ 04:52)  HR: 69 (02-26-20 @ 04:52) (64 - 75)  BP: 166/81 (02-26-20 @ 04:52) (142/69 - 170/84)  RR: 18 (02-26-20 @ 04:52) (18 - 18)  SpO2: 99% (02-26-20 @ 04:52) (97% - 100%)  Wt(kg): --        02-25-20 @ 07:01  -  02-26-20 @ 07:00  --------------------------------------------------------  IN: 1050 mL / OUT: 701 mL / NET: 349 mL      Physical Exam:  	Gen: NAD  	HEENT: MMM  	Pulm: CTA B/L  	CV: S1S2  	Abd: Soft, +BS  	Ext: No LE edema B/L                      Neuro: Awake   	Skin: Warm and Dry   	Vascular access: PC    LABS/STUDIES  --------------------------------------------------------------------------------              8.0    7.19  >-----------<  258      [02-25-20 @ 06:04]              25.5     133  |  92  |  65  ----------------------------<  213      [02-26-20 @ 07:14]  4.4   |  26  |  6.20        Ca     8.8     [02-26-20 @ 07:14]      Mg     2.1     [02-24-20 @ 09:16]      Phos  3.3     [02-26-20 @ 07:14]            Creatinine Trend:  SCr 6.20 [02-26 @ 07:14]  SCr 4.67 [02-25 @ 06:04]  SCr 6.75 [02-24 @ 09:16]  SCr 4.84 [02-23 @ 07:18]  SCr 2.98 [02-22 @ 04:45]        Iron 45, TIBC 145, %sat 31      [12-26-19 @ 23:24]  Ferritin 1369      [12-26-19 @ 23:24]  PTH -- (Ca 8.4)      [02-21-20 @ 08:54]   93  PTH -- (Ca 6.9)      [06-05-19 @ 08:14]   562  HbA1c 6.0      [02-21-20 @ 08:53]  TSH 6.22      [01-03-20 @ 10:03]  Lipid: chol 134, , HDL 56, LDL 52      [12-15-19 @ 10:56]

## 2020-02-26 NOTE — PROGRESS NOTE ADULT - ASSESSMENT
64 year old man with a history of CAD s/p stents 2009 and 2019, HTN, DM, Carotid stenosis, Hypothyroidism, and ESRD on HD, recently admitted for multiple intracerebral infarcts and is s/p TPA (now on ASA/Plavix) w/ hospital stay c/b afib (now on Eliquis) and status epilepticus requiring intubation, pt now extubated and w/ PEG tube in process of transitioning to whole [employer], presenting from rehab w/ low hgb.     ESRD on HD via tunnelled HD cathter   on MWF at rehab  Pt tolerated HD on 2/24 with 2L removed  Plan for HD today  Monitor BMP and BP    Anemia  Hb low but stable  s/p pRBCs  Monitor Hb  s/p  EGD 2/24   * 2/26-Discussed with Son Chris Castelan at length regarding initiating IGOR therapy. Pt with CVA event >30 days ago, and may benefit from IGOR . Risks explained to son including CVA and Cardiovascular events, benefits including -- decreased need of blood transfusion. pt son verbalized understanding of risks and benefits and agreed to initaite IGOR therapy.     HTN  BP fluctuating  Monitor BP    CKD-BMD  PTH low,   check phos daily  Monitor serum calcium and PO4

## 2020-02-26 NOTE — PROGRESS NOTE ADULT - SUBJECTIVE AND OBJECTIVE BOX
INTERVAL HPI/OVERNIGHT EVENTS:    pt seen and examined  family at bedside  he denies abdominal pain, n/v  last bm yesterday  tolerating PO   hgb remains stable    MEDICATIONS  (STANDING):  amLODIPine   Tablet 5 milliGRAM(s) Oral daily  apixaban 5 milliGRAM(s) Enteral Tube every 12 hours  atorvastatin 80 milliGRAM(s) Oral at bedtime  carvedilol 6.25 milliGRAM(s) Oral every 12 hours  clopidogrel Tablet 75 milliGRAM(s) Oral daily  dextrose 5%. 1000 milliLiter(s) (50 mL/Hr) IV Continuous <Continuous>  dextrose 50% Injectable 12.5 Gram(s) IV Push once  dextrose 50% Injectable 25 Gram(s) IV Push once  dextrose 50% Injectable 25 Gram(s) IV Push once  epoetin cortney Injectable 96904 Unit(s) IV Push <User Schedule>  escitalopram 5 milliGRAM(s) Oral daily  insulin glargine Injectable (LANTUS) 10 Unit(s) SubCutaneous at bedtime  insulin lispro (HumaLOG) corrective regimen sliding scale   SubCutaneous Before meals and at bedtime  lacosamide Solution 50 milliGRAM(s) Oral two times a day  levETIRAcetam  Solution 125 milliGRAM(s) Oral <User Schedule>  levETIRAcetam  Solution 250 milliGRAM(s) Oral two times a day  levothyroxine 50 MICROGram(s) Oral daily  melatonin 3 milliGRAM(s) Oral at bedtime  Nephro-macie 1 Tablet(s) Oral daily  pantoprazole    Tablet 40 milliGRAM(s) Oral before breakfast  polyethylene glycol 3350 17 Gram(s) Oral daily  senna 1 Tablet(s) Oral at bedtime  traZODone 50 milliGRAM(s) Oral at bedtime    MEDICATIONS  (PRN):  dextrose 40% Gel 15 Gram(s) Oral once PRN Blood Glucose LESS THAN 70 milliGRAM(s)/deciliter  glucagon  Injectable 1 milliGRAM(s) IntraMuscular once PRN Glucose LESS THAN 70 milligrams/deciliter  guaiFENesin   Syrup  (Sugar-Free) 100 milliGRAM(s) Oral every 6 hours PRN Cough      Allergies    No Known Allergies    Intolerances        Review of Systems:    General:  No wt loss, fevers, chills, night sweats,fatigue,   Eyes:  Good vision, no reported pain  ENT:  No sore throat, pain, runny nose, dysphagia  CV:  No pain, palpitations, hypo/hypertension  Resp:  No dyspnea, cough, tachypnea, wheezing  GI:  No pain, No nausea, No vomiting, No diarrhea, No constipation, No weight loss, No fever, No pruritis, No rectal bleeding, No melena, No dysphagia  :  No pain, bleeding, incontinence, nocturia  Muscle:  No pain, weakness  Neuro:  No weakness, tingling, memory problems  Psych:  No fatigue, insomnia, mood problems, depression  Endocrine:  No polyuria, polydypsia, cold/heat intolerance  Heme:  No petechiae, ecchymosis, easy bruisability  Skin:  No rash, tattoos, scars, edema      Vital Signs Last 24 Hrs  T(C): 36.5 (26 Feb 2020 09:35), Max: 37 (26 Feb 2020 04:52)  T(F): 97.7 (26 Feb 2020 09:35), Max: 98.6 (26 Feb 2020 04:52)  HR: 66 (26 Feb 2020 09:35) (64 - 75)  BP: 167/78 (26 Feb 2020 09:35) (142/69 - 170/73)  BP(mean): --  RR: 18 (26 Feb 2020 09:35) (18 - 18)  SpO2: 99% (26 Feb 2020 09:35) (97% - 100%)    PHYSICAL EXAM:    Constitutional: NAD, well-developed  HEENT: EOMI, throat clear  Neck: No LAD, supple  Respiratory: CTA and P  Cardiovascular: S1 and S2, RRR, no M  Gastrointestinal: BS+, soft, NT/ND, neg HSM, + peg c/d/i  Extremities: No peripheral edema, neg clubing, cyanosis  Vascular: 2+ peripheral pulses  Neurological: A/O x 3, no focal deficits  Psychiatric: Normal mood, normal affect  Skin: No rashes      LABS:                        8.3    5.91  )-----------( 276      ( 26 Feb 2020 07:14 )             26.7     02-26    133<L>  |  92<L>  |  65<H>  ----------------------------<  213<H>  4.4   |  26  |  6.20<H>    Ca    8.8      26 Feb 2020 07:14  Phos  3.3     02-26            RADIOLOGY & ADDITIONAL TESTS:

## 2020-02-26 NOTE — PROGRESS NOTE ADULT - PROBLEM SELECTOR PLAN 1
P/w acute blood loss anemia in setting of melena likely 2/2 upper GI bleed  - Of note nonbleeding ulcers on previous EGD from December  - s/p 1 PRBC 2/20 and 1 unit 2/21  - repeat EGD (2/24)- no source of bleeding identified, intact gastrostomy tube  - protonix 40 PO QD  - maintain active type and screen, 2 large IV bores   - CBCs QD  - GI following; no active intervention at this time, resume plavix and eliquis

## 2020-02-27 ENCOUNTER — TRANSCRIPTION ENCOUNTER (OUTPATIENT)
Age: 65
End: 2020-02-27

## 2020-02-27 VITALS
SYSTOLIC BLOOD PRESSURE: 164 MMHG | HEART RATE: 67 BPM | TEMPERATURE: 99 F | RESPIRATION RATE: 18 BRPM | OXYGEN SATURATION: 99 % | DIASTOLIC BLOOD PRESSURE: 79 MMHG

## 2020-02-27 LAB
ANION GAP SERPL CALC-SCNC: 14 MMOL/L — SIGNIFICANT CHANGE UP (ref 5–17)
BUN SERPL-MCNC: 47 MG/DL — HIGH (ref 7–23)
CALCIUM SERPL-MCNC: 8.8 MG/DL — SIGNIFICANT CHANGE UP (ref 8.4–10.5)
CHLORIDE SERPL-SCNC: 90 MMOL/L — LOW (ref 96–108)
CO2 SERPL-SCNC: 26 MMOL/L — SIGNIFICANT CHANGE UP (ref 22–31)
CREAT SERPL-MCNC: 4.7 MG/DL — HIGH (ref 0.5–1.3)
GLUCOSE BLDC GLUCOMTR-MCNC: 191 MG/DL — HIGH (ref 70–99)
GLUCOSE BLDC GLUCOMTR-MCNC: 221 MG/DL — HIGH (ref 70–99)
GLUCOSE SERPL-MCNC: 210 MG/DL — HIGH (ref 70–99)
HCT VFR BLD CALC: 27.7 % — LOW (ref 39–50)
HGB BLD-MCNC: 8.6 G/DL — LOW (ref 13–17)
MAGNESIUM SERPL-MCNC: 2 MG/DL — SIGNIFICANT CHANGE UP (ref 1.6–2.6)
MCHC RBC-ENTMCNC: 29.1 PG — SIGNIFICANT CHANGE UP (ref 27–34)
MCHC RBC-ENTMCNC: 31 GM/DL — LOW (ref 32–36)
MCV RBC AUTO: 93.6 FL — SIGNIFICANT CHANGE UP (ref 80–100)
NRBC # BLD: 0 /100 WBCS — SIGNIFICANT CHANGE UP (ref 0–0)
PHOSPHATE SERPL-MCNC: 2.8 MG/DL — SIGNIFICANT CHANGE UP (ref 2.5–4.5)
PLATELET # BLD AUTO: 261 K/UL — SIGNIFICANT CHANGE UP (ref 150–400)
POTASSIUM SERPL-MCNC: 4.2 MMOL/L — SIGNIFICANT CHANGE UP (ref 3.5–5.3)
POTASSIUM SERPL-SCNC: 4.2 MMOL/L — SIGNIFICANT CHANGE UP (ref 3.5–5.3)
RBC # BLD: 2.96 M/UL — LOW (ref 4.2–5.8)
RBC # FLD: 15.5 % — HIGH (ref 10.3–14.5)
SODIUM SERPL-SCNC: 130 MMOL/L — LOW (ref 135–145)
WBC # BLD: 6.15 K/UL — SIGNIFICANT CHANGE UP (ref 3.8–10.5)
WBC # FLD AUTO: 6.15 K/UL — SIGNIFICANT CHANGE UP (ref 3.8–10.5)

## 2020-02-27 PROCEDURE — 96374 THER/PROPH/DIAG INJ IV PUSH: CPT

## 2020-02-27 PROCEDURE — 84132 ASSAY OF SERUM POTASSIUM: CPT

## 2020-02-27 PROCEDURE — 82310 ASSAY OF CALCIUM: CPT

## 2020-02-27 PROCEDURE — 84100 ASSAY OF PHOSPHORUS: CPT

## 2020-02-27 PROCEDURE — 86900 BLOOD TYPING SEROLOGIC ABO: CPT

## 2020-02-27 PROCEDURE — 84295 ASSAY OF SERUM SODIUM: CPT

## 2020-02-27 PROCEDURE — 80053 COMPREHEN METABOLIC PANEL: CPT

## 2020-02-27 PROCEDURE — 36430 TRANSFUSION BLD/BLD COMPNT: CPT

## 2020-02-27 PROCEDURE — 82947 ASSAY GLUCOSE BLOOD QUANT: CPT

## 2020-02-27 PROCEDURE — 86850 RBC ANTIBODY SCREEN: CPT

## 2020-02-27 PROCEDURE — 82803 BLOOD GASES ANY COMBINATION: CPT

## 2020-02-27 PROCEDURE — 83735 ASSAY OF MAGNESIUM: CPT

## 2020-02-27 PROCEDURE — 85027 COMPLETE CBC AUTOMATED: CPT

## 2020-02-27 PROCEDURE — 99285 EMERGENCY DEPT VISIT HI MDM: CPT | Mod: 25

## 2020-02-27 PROCEDURE — 86901 BLOOD TYPING SEROLOGIC RH(D): CPT

## 2020-02-27 PROCEDURE — 83970 ASSAY OF PARATHORMONE: CPT

## 2020-02-27 PROCEDURE — 85014 HEMATOCRIT: CPT

## 2020-02-27 PROCEDURE — 93005 ELECTROCARDIOGRAM TRACING: CPT

## 2020-02-27 PROCEDURE — 82435 ASSAY OF BLOOD CHLORIDE: CPT

## 2020-02-27 PROCEDURE — 97116 GAIT TRAINING THERAPY: CPT

## 2020-02-27 PROCEDURE — 83605 ASSAY OF LACTIC ACID: CPT

## 2020-02-27 PROCEDURE — 83036 HEMOGLOBIN GLYCOSYLATED A1C: CPT

## 2020-02-27 PROCEDURE — 99239 HOSP IP/OBS DSCHRG MGMT >30: CPT

## 2020-02-27 PROCEDURE — 80048 BASIC METABOLIC PNL TOTAL CA: CPT

## 2020-02-27 PROCEDURE — 92610 EVALUATE SWALLOWING FUNCTION: CPT

## 2020-02-27 PROCEDURE — 85610 PROTHROMBIN TIME: CPT

## 2020-02-27 PROCEDURE — 99261: CPT

## 2020-02-27 PROCEDURE — P9016: CPT

## 2020-02-27 PROCEDURE — 97166 OT EVAL MOD COMPLEX 45 MIN: CPT

## 2020-02-27 PROCEDURE — 85730 THROMBOPLASTIN TIME PARTIAL: CPT

## 2020-02-27 PROCEDURE — 97535 SELF CARE MNGMENT TRAINING: CPT

## 2020-02-27 PROCEDURE — 86923 COMPATIBILITY TEST ELECTRIC: CPT

## 2020-02-27 PROCEDURE — 82272 OCCULT BLD FECES 1-3 TESTS: CPT

## 2020-02-27 PROCEDURE — 76942 ECHO GUIDE FOR BIOPSY: CPT

## 2020-02-27 PROCEDURE — 97162 PT EVAL MOD COMPLEX 30 MIN: CPT

## 2020-02-27 PROCEDURE — 82962 GLUCOSE BLOOD TEST: CPT

## 2020-02-27 PROCEDURE — 82330 ASSAY OF CALCIUM: CPT

## 2020-02-27 PROCEDURE — 97530 THERAPEUTIC ACTIVITIES: CPT

## 2020-02-27 PROCEDURE — P9040: CPT

## 2020-02-27 PROCEDURE — 97112 NEUROMUSCULAR REEDUCATION: CPT

## 2020-02-27 RX ORDER — AMLODIPINE BESYLATE 2.5 MG/1
1 TABLET ORAL
Qty: 30 | Refills: 0
Start: 2020-02-27 | End: 2020-03-27

## 2020-02-27 RX ORDER — TRAZODONE HCL 50 MG
1 TABLET ORAL
Qty: 0 | Refills: 0 | DISCHARGE
Start: 2020-02-27

## 2020-02-27 RX ORDER — MIDODRINE HYDROCHLORIDE 2.5 MG/1
1 TABLET ORAL
Qty: 0 | Refills: 0 | DISCHARGE

## 2020-02-27 RX ORDER — TRAZODONE HCL 50 MG
1 TABLET ORAL
Qty: 0 | Refills: 0 | DISCHARGE

## 2020-02-27 RX ADMIN — Medication 1 TABLET(S): at 13:28

## 2020-02-27 RX ADMIN — Medication 50 MICROGRAM(S): at 05:33

## 2020-02-27 RX ADMIN — APIXABAN 5 MILLIGRAM(S): 2.5 TABLET, FILM COATED ORAL at 05:33

## 2020-02-27 RX ADMIN — CARVEDILOL PHOSPHATE 6.25 MILLIGRAM(S): 80 CAPSULE, EXTENDED RELEASE ORAL at 05:33

## 2020-02-27 RX ADMIN — AMLODIPINE BESYLATE 5 MILLIGRAM(S): 2.5 TABLET ORAL at 05:33

## 2020-02-27 RX ADMIN — Medication 1: at 08:29

## 2020-02-27 RX ADMIN — CLOPIDOGREL BISULFATE 75 MILLIGRAM(S): 75 TABLET, FILM COATED ORAL at 13:28

## 2020-02-27 RX ADMIN — LACOSAMIDE 50 MILLIGRAM(S): 50 TABLET ORAL at 05:33

## 2020-02-27 RX ADMIN — PANTOPRAZOLE SODIUM 40 MILLIGRAM(S): 20 TABLET, DELAYED RELEASE ORAL at 05:33

## 2020-02-27 RX ADMIN — Medication 100 MILLIGRAM(S): at 05:35

## 2020-02-27 RX ADMIN — Medication 2: at 13:27

## 2020-02-27 RX ADMIN — POLYETHYLENE GLYCOL 3350 17 GRAM(S): 17 POWDER, FOR SOLUTION ORAL at 13:28

## 2020-02-27 RX ADMIN — ESCITALOPRAM OXALATE 5 MILLIGRAM(S): 10 TABLET, FILM COATED ORAL at 13:28

## 2020-02-27 NOTE — PROGRESS NOTE ADULT - PROBLEM SELECTOR PLAN 1
P/w acute blood loss anemia in setting of melena likely 2/2 upper GI bleed  - Of note nonbleeding ulcers on previous EGD from December  - s/p 1 PRBC 2/20 and 1 unit 2/21  - repeat EGD (2/24)- no source of bleeding identified, intact gastrostomy tube  - protonix 40 PO QD  - maintain active type and screen, 2 large IV bores   - CBCs QD  - GI following; no active intervention at this time, resume plavix and eliquis P/w acute blood loss anemia in setting of melena likely 2/2 upper GI bleed  - Of note nonbleeding ulcers on previous EGD from December  - s/p 1 PRBC 2/20 and 1 unit 2/21  - repeat EGD (2/24)- no source of bleeding identified, intact gastrostomy tube  - protonix 40 PO QD  - maintain active type and screen, 2 large IV bores   - CBCs QD  - GI following; no active intervention at this time, resumed Plavix and Eliquis

## 2020-02-27 NOTE — PROGRESS NOTE ADULT - PROBLEM SELECTOR PLAN 10
Transitions of Care Status:  1.  Name of PCP: Dr. Morejon  2.  PCP Contacted on Admission: [ ] Y    [x] N; cardiology team is following    3.  PCP contacted at Discharge: [ ] Y    [ ] N    [ ] N/A  4.  Post-Discharge Appointment Date and Location:  5.  Summary of Handoff given to PCP: Transitions of Care Status:  1.  Name of PCP: Dr. Morejon  2.  PCP Contacted on Admission: [ ] Y    [x] N; cardiology team is following    3.  PCP contacted at Discharge: [ ] Y    [ ] N    [ ] N/A  4.  Post-Discharge Appointment Date and Location:  5.  Summary of Handoff given to PCP:    D/C to rehab today, next HD tomorrow. 40 minutes spent discharging the patient.

## 2020-02-27 NOTE — CHART NOTE - NSCHARTNOTEFT_GEN_A_CORE
Nutrition Follow Up Note  Patient seen for: nutrition follow-up    Chart reviewed events noted. This is a 63 yo M w/ PMH HTN, T2DM, CAD s/p stent 2009, 2019, carotid stenosis, ESRD on HD (MWF), awaiting kidney transplant, CVA (12/2019) s/p tpa (on plavix) c/b dysphagia s/p PEG, a. fib (on eliquis) who presents from Rehab facility with hgb level of 6.6, work-up for GI bleed. GI following, no source found. Discharge planning to rehab.      Source: medical record, wife, patient     Diet : Dysphagia 2 + thin liquids + Renal+ DASH + carbohydrate consistent+ No beef, No pork + Nepro 1x     Patient observed consuming lunch during time of visit, reports good tolerance to current diet, consuming 50-75% of estimated needs. Pt also receiving overnight feeds (12 hrs) which pt endorses good tolerance . No acute GI distress noted. Last 2/27     PO intake : ~50-75%     Source for PO intake: pt/wife report      Enteral /Parenteral Nutrition: N/A    Weights:   Post-HD weight: 168.6lbs (2/27)  Dosing weihgt: 161lbs (2/20)    Pertinent Medications: MEDICATIONS  (STANDING):  amLODIPine   Tablet 5 milliGRAM(s) Oral daily  apixaban 5 milliGRAM(s) Enteral Tube every 12 hours  atorvastatin 80 milliGRAM(s) Oral at bedtime  carvedilol 6.25 milliGRAM(s) Oral every 12 hours  clopidogrel Tablet 75 milliGRAM(s) Oral daily  dextrose 5%. 1000 milliLiter(s) (50 mL/Hr) IV Continuous <Continuous>  dextrose 50% Injectable 12.5 Gram(s) IV Push once  dextrose 50% Injectable 25 Gram(s) IV Push once  dextrose 50% Injectable 25 Gram(s) IV Push once  epoetin cortney Injectable 55110 Unit(s) IV Push <User Schedule>  escitalopram 5 milliGRAM(s) Oral daily  insulin glargine Injectable (LANTUS) 12 Unit(s) SubCutaneous at bedtime  insulin lispro (HumaLOG) corrective regimen sliding scale   SubCutaneous Before meals and at bedtime  lacosamide Solution 50 milliGRAM(s) Oral two times a day  levETIRAcetam  Solution 125 milliGRAM(s) Oral <User Schedule>  levETIRAcetam  Solution 250 milliGRAM(s) Oral two times a day  levothyroxine 50 MICROGram(s) Oral daily  melatonin 3 milliGRAM(s) Oral at bedtime  Nephro-macie 1 Tablet(s) Oral daily  pantoprazole    Tablet 40 milliGRAM(s) Oral before breakfast  polyethylene glycol 3350 17 Gram(s) Oral daily  senna 1 Tablet(s) Oral at bedtime  traZODone 50 milliGRAM(s) Oral at bedtime    MEDICATIONS  (PRN):  dextrose 40% Gel 15 Gram(s) Oral once PRN Blood Glucose LESS THAN 70 milliGRAM(s)/deciliter  glucagon  Injectable 1 milliGRAM(s) IntraMuscular once PRN Glucose LESS THAN 70 milligrams/deciliter  guaiFENesin   Syrup  (Sugar-Free) 100 milliGRAM(s) Oral every 6 hours PRN Cough    Pertinent Labs: 02-27 @ 07:24: Na 130<L>, BUN 47<H>, Cr 4.70<H>, <H>, K+ 4.2, Phos 2.8, Mg 2.0, Alk Phos --, ALT/SGPT --, AST/SGOT --, HbA1c --    Finger Sticks:  POCT Blood Glucose.: 221 mg/dL (02-27 @ 13:24)  POCT Blood Glucose.: 191 mg/dL (02-27 @ 08:26)  POCT Blood Glucose.: 228 mg/dL (02-26 @ 21:51)  POCT Blood Glucose.: 209 mg/dL (02-26 @ 20:47)  POCT Blood Glucose.: 130 mg/dL (02-26 @ 18:17)      Skin per nursing documentation: Na 130, creatinine 4.70, BUN: 47,   Edema: +1 generalized    Estimated Needs:   [x ] no change since previous assessment  [ ] recalculated:     Previous Nutrition Diagnosis: Increased Nutrient Needs +swallowing difficulty  Nutrition Diagnosis is: on going, being addressed with supplementation TFs, textured modified diet     New Nutrition Diagnosis: N/A     Interventions:   Recommend  1) Recommend removing DASH restriction to allows for more menu choices.   2) Continue Nepro 1x per day    3)  Continue overnight feeds of Nepro 1.8 70mL/hr x12 hours (starting at 8pm). Provides: 840mL formula, 611mL free water, 1512kcal and 68g protein (21.9kcal/kg and 1.0 g/kg based on UBW 68.9kg).   4) Monitor tolerance to EN and adequacy of PO intake and adjust EN as needed.     Monitoring and Evaluation:     Continue to monitor Nutritional intake, Tolerance to diet prescription, weights, labs, skin integrity    RD remains available upon request and will follow up per protocol  Kinjal Santoyo RD, CDN, Pager # [phone]

## 2020-02-27 NOTE — PROGRESS NOTE ADULT - PROBLEM SELECTOR PLAN 7
Hgb A1c 8.4 on December. Patient on 12 units Lantus qhs and sliding scale at rehab.  - c/w Lantus 12 units qhs and place on low correctional sliding scale  - f/u hgb A1C in the AM
Hgb A1c 8.4 on December. Patient on 12 units Lantus qhs and sliding scale at rehab.  - c/w Lantus 12 units qhs and place on low correctional sliding scale  - f/u hgb A1C in the AM
Hgb A1c 8.4 on December. Patient on 12 units Lantus qhs and sliding scale at rehab.  - c/w Lantus 12 units qhs and place on low correctional sliding scale  - hgb A1C (2/21)- 6%
Hgb A1c 8.4 on December. Patient on 12 units Lantus qhs and sliding scale at rehab.  - c/w Lantus 12 units qhs and place on low correctional sliding scale  - f/u hgb A1C in the AM

## 2020-02-27 NOTE — PROGRESS NOTE ADULT - SUBJECTIVE AND OBJECTIVE BOX
INTERVAL HPI/OVERNIGHT EVENTS:    pt seen and examined  family at bedside  he denies abdominal pain, n/v  tolerating PO   hgb remains stable    MEDICATIONS  (STANDING):  amLODIPine   Tablet 5 milliGRAM(s) Oral daily  apixaban 5 milliGRAM(s) Enteral Tube every 12 hours  atorvastatin 80 milliGRAM(s) Oral at bedtime  carvedilol 6.25 milliGRAM(s) Oral every 12 hours  clopidogrel Tablet 75 milliGRAM(s) Oral daily  dextrose 5%. 1000 milliLiter(s) (50 mL/Hr) IV Continuous <Continuous>  dextrose 50% Injectable 12.5 Gram(s) IV Push once  dextrose 50% Injectable 25 Gram(s) IV Push once  dextrose 50% Injectable 25 Gram(s) IV Push once  epoetin cortney Injectable 88135 Unit(s) IV Push <User Schedule>  escitalopram 5 milliGRAM(s) Oral daily  insulin glargine Injectable (LANTUS) 12 Unit(s) SubCutaneous at bedtime  insulin lispro (HumaLOG) corrective regimen sliding scale   SubCutaneous Before meals and at bedtime  lacosamide Solution 50 milliGRAM(s) Oral two times a day  levETIRAcetam  Solution 125 milliGRAM(s) Oral <User Schedule>  levETIRAcetam  Solution 250 milliGRAM(s) Oral two times a day  levothyroxine 50 MICROGram(s) Oral daily  melatonin 3 milliGRAM(s) Oral at bedtime  Nephro-macie 1 Tablet(s) Oral daily  pantoprazole    Tablet 40 milliGRAM(s) Oral before breakfast  polyethylene glycol 3350 17 Gram(s) Oral daily  senna 1 Tablet(s) Oral at bedtime  traZODone 50 milliGRAM(s) Oral at bedtime    MEDICATIONS  (PRN):  dextrose 40% Gel 15 Gram(s) Oral once PRN Blood Glucose LESS THAN 70 milliGRAM(s)/deciliter  glucagon  Injectable 1 milliGRAM(s) IntraMuscular once PRN Glucose LESS THAN 70 milligrams/deciliter  guaiFENesin   Syrup  (Sugar-Free) 100 milliGRAM(s) Oral every 6 hours PRN Cough      Allergies    No Known Allergies    Intolerances        Review of Systems:    General:  No wt loss, fevers, chills, night sweats,fatigue,   Eyes:  Good vision, no reported pain  ENT:  No sore throat, pain, runny nose, dysphagia  CV:  No pain, palpitations, hypo/hypertension  Resp:  No dyspnea, cough, tachypnea, wheezing  GI:  No pain, No nausea, No vomiting, No diarrhea, No constipation, No weight loss, No fever, No pruritis, No rectal bleeding, No melena, No dysphagia  :  No pain, bleeding, incontinence, nocturia  Muscle:  No pain, weakness  Neuro:  No weakness, tingling, memory problems  Psych:  No fatigue, insomnia, mood problems, depression  Endocrine:  No polyuria, polydypsia, cold/heat intolerance  Heme:  No petechiae, ecchymosis, easy bruisability  Skin:  No rash, tattoos, scars, edema      Vital Signs Last 24 Hrs  T(C): 37.2 (27 Feb 2020 12:44), Max: 37.2 (27 Feb 2020 12:44)  T(F): 98.9 (27 Feb 2020 12:44), Max: 98.9 (27 Feb 2020 12:44)  HR: 67 (27 Feb 2020 12:44) (64 - 74)  BP: 164/79 (27 Feb 2020 12:44) (116/61 - 164/79)  BP(mean): --  RR: 18 (27 Feb 2020 12:44) (18 - 18)  SpO2: 99% (27 Feb 2020 12:44) (96% - 100%)    PHYSICAL EXAM:    Constitutional: NAD, well-developed  HEENT: EOMI, throat clear  Neck: No LAD, supple  Respiratory: CTA and P  Cardiovascular: S1 and S2, RRR, no M  Gastrointestinal: BS+, soft, NT/ND, neg HSM, + peg c/d/i  Extremities: No peripheral edema, neg clubing, cyanosis  Vascular: 2+ peripheral pulses  Neurological: A/O x 3, no focal deficits  Psychiatric: Normal mood, normal affect  Skin: No rashes      LABS:                        8.6    6.15  )-----------( 261      ( 27 Feb 2020 07:24 )             27.7     02-27    130<L>  |  90<L>  |  47<H>  ----------------------------<  210<H>  4.2   |  26  |  4.70<H>    Ca    8.8      27 Feb 2020 07:24  Phos  2.8     02-27  Mg     2.0     02-27            RADIOLOGY & ADDITIONAL TESTS:

## 2020-02-27 NOTE — PROGRESS NOTE ADULT - PROBLEM SELECTOR PROBLEM 1
Upper GI bleed

## 2020-02-27 NOTE — PROGRESS NOTE ADULT - PROBLEM SELECTOR PROBLEM 8
Chronic health problem

## 2020-02-27 NOTE — PROGRESS NOTE ADULT - SUBJECTIVE AND OBJECTIVE BOX
PROGRESS NOTE:     Patient is a 64y old  Male who presents with a chief complaint of Anemia, concern for GIB (26 Feb 2020 12:07)      SUBJECTIVE / OVERNIGHT EVENTS: Pt seen and examined. No acute overnight events. Pt reported no fever, chills, CP, SOB, abdominal pain, N/V, urinary or bowel issues.    ADDITIONAL REVIEW OF SYSTEMS: Otherwise negative    MEDICATIONS  (STANDING):  amLODIPine   Tablet 5 milliGRAM(s) Oral daily  apixaban 5 milliGRAM(s) Enteral Tube every 12 hours  atorvastatin 80 milliGRAM(s) Oral at bedtime  carvedilol 6.25 milliGRAM(s) Oral every 12 hours  clopidogrel Tablet 75 milliGRAM(s) Oral daily  dextrose 5%. 1000 milliLiter(s) (50 mL/Hr) IV Continuous <Continuous>  dextrose 50% Injectable 12.5 Gram(s) IV Push once  dextrose 50% Injectable 25 Gram(s) IV Push once  dextrose 50% Injectable 25 Gram(s) IV Push once  epoetin cortney Injectable 55762 Unit(s) IV Push <User Schedule>  escitalopram 5 milliGRAM(s) Oral daily  insulin glargine Injectable (LANTUS) 12 Unit(s) SubCutaneous at bedtime  insulin lispro (HumaLOG) corrective regimen sliding scale   SubCutaneous Before meals and at bedtime  lacosamide Solution 50 milliGRAM(s) Oral two times a day  levETIRAcetam  Solution 125 milliGRAM(s) Oral <User Schedule>  levETIRAcetam  Solution 250 milliGRAM(s) Oral two times a day  levothyroxine 50 MICROGram(s) Oral daily  melatonin 3 milliGRAM(s) Oral at bedtime  Nephro-macie 1 Tablet(s) Oral daily  pantoprazole    Tablet 40 milliGRAM(s) Oral before breakfast  polyethylene glycol 3350 17 Gram(s) Oral daily  senna 1 Tablet(s) Oral at bedtime  traZODone 50 milliGRAM(s) Oral at bedtime    MEDICATIONS  (PRN):  dextrose 40% Gel 15 Gram(s) Oral once PRN Blood Glucose LESS THAN 70 milliGRAM(s)/deciliter  glucagon  Injectable 1 milliGRAM(s) IntraMuscular once PRN Glucose LESS THAN 70 milligrams/deciliter  guaiFENesin   Syrup  (Sugar-Free) 100 milliGRAM(s) Oral every 6 hours PRN Cough      CAPILLARY BLOOD GLUCOSE      POCT Blood Glucose.: 228 mg/dL (26 Feb 2020 21:51)  POCT Blood Glucose.: 209 mg/dL (26 Feb 2020 20:47)  POCT Blood Glucose.: 130 mg/dL (26 Feb 2020 18:17)  POCT Blood Glucose.: 284 mg/dL (26 Feb 2020 13:32)  POCT Blood Glucose.: 214 mg/dL (26 Feb 2020 08:16)    I&O's Summary    26 Feb 2020 07:01  -  27 Feb 2020 07:00  --------------------------------------------------------  IN: 1180 mL / OUT: 2400 mL / NET: -1220 mL        PHYSICAL EXAM:  Vital Signs Last 24 Hrs  T(C): 36.8 (27 Feb 2020 04:30), Max: 37.1 (26 Feb 2020 21:34)  T(F): 98.2 (27 Feb 2020 04:30), Max: 98.7 (26 Feb 2020 21:34)  HR: 68 (27 Feb 2020 04:30) (64 - 74)  BP: 148/70 (27 Feb 2020 04:30) (116/61 - 167/78)  BP(mean): --  RR: 18 (27 Feb 2020 04:30) (18 - 18)  SpO2: 96% (27 Feb 2020 04:30) (96% - 100%)    CONSTITUTIONAL: NAD, well-developed  RESPIRATORY: Normal respiratory effort; lungs are clear to auscultation bilaterally  CARDIOVASCULAR: Regular rate and rhythm, normal S1 and S2, no murmur/rub/gallop  ABDOMEN: Nontender to palpation, normoactive bowel sounds, no rebound/guarding; No hepatosplenomegaly  EXTREMITIES: No lower extremity edema; Peripheral pulses are 2+ bilaterally  MUSCLOSKELETAL: no clubbing or cyanosis of digits; no joint swelling or tenderness to palpation  PSYCH: A+O to person, place, and time; affect appropriate    LABS:                        8.3    5.91  )-----------( 276      ( 26 Feb 2020 07:14 )             26.7     02-26    133<L>  |  92<L>  |  65<H>  ----------------------------<  213<H>  4.4   |  26  |  6.20<H>    Ca    8.8      26 Feb 2020 07:14  Phos  3.3     02-26                  RADIOLOGY & ADDITIONAL TESTS:  Results Reviewed:   Imaging Personally Reviewed:  Electrocardiogram Personally Reviewed:    COORDINATION OF CARE:  Care Discussed with Consultants/Other Providers [Y/N]:  Prior or Outpatient Records Reviewed [Y/N]:

## 2020-02-27 NOTE — DISCHARGE NOTE NURSING/CASE MANAGEMENT/SOCIAL WORK - NSDCPEPTSTRK_GEN_ALL_CORE
Need for follow up after discharge/Stroke support groups for patients, families, and friends/Prescribed medications/Risk factors for stroke/Stroke education booklet/Stroke warning signs and symptoms/Signs and symptoms of stroke/Call 911 for stroke

## 2020-02-27 NOTE — PROGRESS NOTE ADULT - ASSESSMENT
64 year old man with a history of CAD s/p stents 2009 and 2019, HTN, DM, Carotid stenosis, Hypothyroidism, and ESRD on HD, recently admitted for multiple intracerebral infarcts and is s/p TPA (now on ASA/Plavix) w/ hospital stay c/b afib (now on Eliquis) and status epilepticus requiring intubation, pt now extubated and w/ PEG tube in process of transitioning to [employer], presenting from rehab w/ low hgb.     ESRD on HD via tunnelled HD cathter   on MWF at rehab  Pt tolerated HD on 2/26 with 2L removed  Plan for HD tomorrow  Monitor BMP and BP    Anemia  Hb low but stable  s/p pRBCs  Monitor Hb  s/p  EGD 2/24   * 2/26-Discussed with Son Chris Castelan at length regarding initiating IGOR therapy. Pt with CVA event >30 days ago, and may benefit from IGOR . Risks explained to son including CVA and Cardiovascular events, benefits including -- decreased need of blood transfusion. pt son verbalized understanding of risks and benefits and agreed to initaite IGOR therapy.   IGOR with HD    HTN  BP fluctuating  Monitor BP    CKD-BMD  PTH low,   check phos daily  Monitor serum calcium and PO4

## 2020-02-27 NOTE — PROGRESS NOTE ADULT - ASSESSMENT
Patient is a 63 yo M w/ PMH HTN, T2DM, CAD s/p stent 2009, 2019, carotid stenosis, ESRD on HD (MWF), CVA (12/2019) s/p tpa (on plavix) c/b dysphagia s/p PEG, a. fib (on eliquis) who presents from rehab facility w/ a hgb level of 6.6. He endorsed having a BM w/ dark stools on Saturday, 2/15 but has resolved as per son.     UGIB   Acute blood loss anemia    - s/p  upper gastrointestinal endoscopy 2/24 with normal esophagus.  - Intact gastrostomy with a patent G-tube present characterized by healthy appearing mucosa.  - Normal examined duodenum.  - No specimens collected  - Resume previous diet.  - Use Protonix (pantoprazole) 40 mg PO daily  - No objection to AC                                                                                                        - EGD with PEG placement 12/27. EGD with non-bleeding gastric ulcers    - please maintain active type and screen  - monitor cbc, transfuse prn  - monitor vitals  - monitor stool color  - continue bowel regimen   - will follow

## 2020-02-27 NOTE — PROGRESS NOTE ADULT - SUBJECTIVE AND OBJECTIVE BOX
[facility]   MD NINA MASON MD RUORU WONG, PA    TEL:  OFFICE: [phone]  DR HSU CELL: [phone]  RAS MORELOS CELL: [phone]  DR. MARQUEZ CELL: [phone]  DR. ALEX CELL: [phone]    FROM 5 PM - 7 AM PLEASE CALL ANSWERING SERVICE: [phone]    RENAL FOLLOW UP NOTE  --------------------------------------------------------------------------------  HPI:      Pt seen and examined at bedside.   Denies SOB, chest pain     PAST HISTORY  --------------------------------------------------------------------------------  No significant changes to PMH, PSH, FHx, SHx, unless otherwise noted    ALLERGIES & MEDICATIONS  --------------------------------------------------------------------------------  Allergies    No Known Allergies    Intolerances      Standing Inpatient Medications  amLODIPine   Tablet 5 milliGRAM(s) Oral daily  apixaban 5 milliGRAM(s) Enteral Tube every 12 hours  atorvastatin 80 milliGRAM(s) Oral at bedtime  carvedilol 6.25 milliGRAM(s) Oral every 12 hours  clopidogrel Tablet 75 milliGRAM(s) Oral daily  dextrose 5%. 1000 milliLiter(s) IV Continuous <Continuous>  dextrose 50% Injectable 12.5 Gram(s) IV Push once  dextrose 50% Injectable 25 Gram(s) IV Push once  dextrose 50% Injectable 25 Gram(s) IV Push once  epoetin cortney Injectable 94421 Unit(s) IV Push <User Schedule>  escitalopram 5 milliGRAM(s) Oral daily  insulin glargine Injectable (LANTUS) 12 Unit(s) SubCutaneous at bedtime  insulin lispro (HumaLOG) corrective regimen sliding scale   SubCutaneous Before meals and at bedtime  lacosamide Solution 50 milliGRAM(s) Oral two times a day  levETIRAcetam  Solution 125 milliGRAM(s) Oral <User Schedule>  levETIRAcetam  Solution 250 milliGRAM(s) Oral two times a day  levothyroxine 50 MICROGram(s) Oral daily  melatonin 3 milliGRAM(s) Oral at bedtime  Nephro-macie 1 Tablet(s) Oral daily  pantoprazole    Tablet 40 milliGRAM(s) Oral before breakfast  polyethylene glycol 3350 17 Gram(s) Oral daily  senna 1 Tablet(s) Oral at bedtime  traZODone 50 milliGRAM(s) Oral at bedtime    PRN Inpatient Medications  dextrose 40% Gel 15 Gram(s) Oral once PRN  glucagon  Injectable 1 milliGRAM(s) IntraMuscular once PRN  guaiFENesin   Syrup  (Sugar-Free) 100 milliGRAM(s) Oral every 6 hours PRN      REVIEW OF SYSTEMS  --------------------------------------------------------------------------------  General: no fever  CVS: no chest pain  RESP: no sob, no cough  ABD: no abdominal pain  MSK: no edema     VITALS/PHYSICAL EXAM  --------------------------------------------------------------------------------  T(C): 36.7 (02-27-20 @ 09:03), Max: 37.1 (02-26-20 @ 21:34)  HR: 67 (02-27-20 @ 09:03) (64 - 74)  BP: 156/76 (02-27-20 @ 09:03) (116/61 - 156/76)  RR: 18 (02-27-20 @ 09:03) (18 - 18)  SpO2: 99% (02-27-20 @ 09:03) (96% - 100%)  Wt(kg): --        02-26-20 @ 07:01  -  02-27-20 @ 07:00  --------------------------------------------------------  IN: 1180 mL / OUT: 2400 mL / NET: -1220 mL    02-27-20 @ 07:01  -  02-27-20 @ 12:09  --------------------------------------------------------  IN: 0 mL / OUT: 101 mL / NET: -101 mL      Physical Exam:  	Gen: NAD  	HEENT: MMM  	Pulm: CTA B/L  	CV: S1S2  	Abd: Soft, +BS  	Ext: No LE edema B/L                      Neuro: Awake alert  	Skin: Warm and Dry   	Vascular access: tunnelled HD catheter     LABS/STUDIES  --------------------------------------------------------------------------------              8.6    6.15  >-----------<  261      [02-27-20 @ 07:24]              27.7     130  |  90  |  47  ----------------------------<  210      [02-27-20 @ 07:24]  4.2   |  26  |  4.70        Ca     8.8     [02-27-20 @ 07:24]      Mg     2.0     [02-27-20 @ 07:24]      Phos  2.8     [02-27-20 @ 07:24]            Creatinine Trend:  SCr 4.70 [02-27 @ 07:24]  SCr 6.20 [02-26 @ 07:14]  SCr 4.67 [02-25 @ 06:04]  SCr 6.75 [02-24 @ 09:16]  SCr 4.84 [02-23 @ 07:18]        Iron 45, TIBC 145, %sat 31      [12-26-19 @ 23:24]  Ferritin 1369      [12-26-19 @ 23:24]  PTH -- (Ca 8.4)      [02-21-20 @ 08:54]   93  PTH -- (Ca 6.9)      [06-05-19 @ 08:14]   562  HbA1c 6.0      [02-21-20 @ 08:53]  TSH 6.22      [01-03-20 @ 10:03]  Lipid: chol 134, , HDL 56, LDL 52      [12-15-19 @ 10:56]

## 2020-02-27 NOTE — PROGRESS NOTE ADULT - REASON FOR ADMISSION
Anemia, concern for GIB

## 2020-02-27 NOTE — PROGRESS NOTE ADULT - NSHPATTENDINGPLANDISCUSS_GEN_ALL_CORE
family , hd uNIT
HD UNIT
HD UNIT, SOn on phone
HD UNIT, wife at bedside
family at bedside
Charlotte Vasquez MD
Family at bedside, medicine team, Dr. Acosta
Family at bedside, medicine team

## 2020-02-27 NOTE — DISCHARGE NOTE NURSING/CASE MANAGEMENT/SOCIAL WORK - PATIENT PORTAL LINK FT
You can access the FollowMyHealth Patient Portal offered by [facility] by registering at the following website: [email or web address]. By joining [facility]’s FollowMyHealth portal, you will also be able to view your health information using other applications (apps) compatible with our system.

## 2020-02-27 NOTE — PROGRESS NOTE ADULT - PROBLEM SELECTOR PLAN 3
History of atrial fibrillation with [id number] of 6. Was taking Eliquis at home for AC  - continue Eliquis and Plavix

## 2020-05-08 PROBLEM — I63.9 CEREBRAL INFARCTION, UNSPECIFIED: Chronic | Status: ACTIVE | Noted: 2020-02-20

## 2020-05-14 ENCOUNTER — APPOINTMENT (OUTPATIENT)
Dept: CARDIOLOGY | Facility: CLINIC | Age: 65
End: 2020-05-14
Payer: MEDICARE

## 2020-05-14 PROCEDURE — 99212 OFFICE O/P EST SF 10 MIN: CPT | Mod: 95

## 2020-05-14 NOTE — DISCUSSION/SUMMARY
[FreeTextEntry1] : The patient is a 63-year-old gentleman diabetes mellitus, hypertension, hyperlipidemia, hypothyroidism, ESRD, coronary artery disease s/p stroke, afib who is hypertensive.\par #1 CAD- s/p LAD stent +IFR, on DAPT, no symptoms,asa and plavix pretransplant\par #2 Htn- increase telmesartan to 80mg and then coreg to 12.5mg bid if needed, son monitors closely\par #3 ESRD- on dialysis, close monitoring by Dr. Cardenas, transplant pending\par #4 DM- on insulin, good control\par #5 Afib- on eliquis, no bleeding\par #6 Neuro- on keppra til end of month\par \par Time started: 3:51PM\par Time ended:  4:03PM

## 2020-05-14 NOTE — HISTORY OF PRESENT ILLNESS
[Medical Office: ([facility])___] : at the medical office located in  [Home] : at home, [unfilled] , at the time of the visit. [FreeTextEntry4] : son [FreeTextEntry1] : Srinivasan had stroke in December. He went to rehab and is improving. He still has PEG tube. Back on the transplant list. He is walking with walker. He can go to bathroom and eat by himself. He has been hypertensive at dialysis.

## 2020-05-14 NOTE — REVIEW OF SYSTEMS
[Dyspnea on exertion] : not dyspnea during exertion [Chest Pain] : no chest pain [Lower Ext Edema] : no extremity edema [Palpitations] : no palpitations [Negative] : Heme/Lymph

## 2020-06-16 ENCOUNTER — APPOINTMENT (OUTPATIENT)
Dept: CV DIAGNOSTICS | Facility: HOSPITAL | Age: 65
End: 2020-06-16

## 2020-07-27 ENCOUNTER — APPOINTMENT (OUTPATIENT)
Dept: ULTRASOUND IMAGING | Facility: CLINIC | Age: 65
End: 2020-07-27
Payer: COMMERCIAL

## 2020-07-27 ENCOUNTER — APPOINTMENT (OUTPATIENT)
Dept: RADIOLOGY | Facility: CLINIC | Age: 65
End: 2020-07-27
Payer: COMMERCIAL

## 2020-07-27 ENCOUNTER — OUTPATIENT (OUTPATIENT)
Dept: OUTPATIENT SERVICES | Facility: HOSPITAL | Age: 65
LOS: 1 days | End: 2020-07-27
Payer: COMMERCIAL

## 2020-07-27 DIAGNOSIS — Z76.82 AWAITING ORGAN TRANSPLANT STATUS: ICD-10-CM

## 2020-07-27 DIAGNOSIS — Z95.1 PRESENCE OF AORTOCORONARY BYPASS GRAFT: Chronic | ICD-10-CM

## 2020-07-27 PROCEDURE — 71046 X-RAY EXAM CHEST 2 VIEWS: CPT | Mod: 26

## 2020-07-27 PROCEDURE — 93880 EXTRACRANIAL BILAT STUDY: CPT | Mod: 26

## 2020-07-27 PROCEDURE — 71046 X-RAY EXAM CHEST 2 VIEWS: CPT

## 2020-07-27 PROCEDURE — 93880 EXTRACRANIAL BILAT STUDY: CPT

## 2020-07-31 ENCOUNTER — APPOINTMENT (OUTPATIENT)
Dept: CV DIAGNOSTICS | Facility: HOSPITAL | Age: 65
End: 2020-07-31

## 2020-07-31 ENCOUNTER — OUTPATIENT (OUTPATIENT)
Dept: OUTPATIENT SERVICES | Facility: HOSPITAL | Age: 65
LOS: 1 days | End: 2020-07-31
Payer: MEDICARE

## 2020-07-31 DIAGNOSIS — I25.10 ATHEROSCLEROTIC HEART DISEASE OF NATIVE CORONARY ARTERY WITHOUT ANGINA PECTORIS: ICD-10-CM

## 2020-07-31 DIAGNOSIS — Z95.1 PRESENCE OF AORTOCORONARY BYPASS GRAFT: Chronic | ICD-10-CM

## 2020-07-31 PROCEDURE — A9500: CPT

## 2020-07-31 PROCEDURE — 93017 CV STRESS TEST TRACING ONLY: CPT

## 2020-07-31 PROCEDURE — 78452 HT MUSCLE IMAGE SPECT MULT: CPT

## 2020-07-31 PROCEDURE — 93016 CV STRESS TEST SUPVJ ONLY: CPT

## 2020-07-31 PROCEDURE — 93018 CV STRESS TEST I&R ONLY: CPT

## 2020-07-31 PROCEDURE — 78452 HT MUSCLE IMAGE SPECT MULT: CPT | Mod: 26

## 2020-08-04 ENCOUNTER — APPOINTMENT (OUTPATIENT)
Dept: DISASTER EMERGENCY | Facility: CLINIC | Age: 65
End: 2020-08-04

## 2020-08-04 ENCOUNTER — LABORATORY RESULT (OUTPATIENT)
Age: 65
End: 2020-08-04

## 2020-08-07 ENCOUNTER — INPATIENT (INPATIENT)
Facility: HOSPITAL | Age: 65
LOS: 11 days | Discharge: HOME | DRG: 233 | End: 2020-08-19
Attending: THORACIC SURGERY (CARDIOTHORACIC VASCULAR SURGERY) | Admitting: INTERNAL MEDICINE
Payer: MEDICARE

## 2020-08-07 VITALS
DIASTOLIC BLOOD PRESSURE: 89 MMHG | SYSTOLIC BLOOD PRESSURE: 188 MMHG | OXYGEN SATURATION: 100 % | RESPIRATION RATE: 12 BRPM | HEIGHT: 70 IN | WEIGHT: 136.69 LBS | TEMPERATURE: 98 F | HEART RATE: 75 BPM

## 2020-08-07 DIAGNOSIS — N18.6 END STAGE RENAL DISEASE: ICD-10-CM

## 2020-08-07 DIAGNOSIS — R94.39 ABNORMAL RESULT OF OTHER CARDIOVASCULAR FUNCTION STUDY: ICD-10-CM

## 2020-08-07 DIAGNOSIS — I25.10 ATHEROSCLEROTIC HEART DISEASE OF NATIVE CORONARY ARTERY WITHOUT ANGINA PECTORIS: ICD-10-CM

## 2020-08-07 DIAGNOSIS — Z95.1 PRESENCE OF AORTOCORONARY BYPASS GRAFT: Chronic | ICD-10-CM

## 2020-08-07 LAB
A1C WITH ESTIMATED AVERAGE GLUCOSE RESULT: 6.6 % — HIGH (ref 4–5.6)
ALBUMIN SERPL ELPH-MCNC: 4.1 G/DL — SIGNIFICANT CHANGE UP (ref 3.3–5)
ALP SERPL-CCNC: 91 U/L — SIGNIFICANT CHANGE UP (ref 40–120)
ALT FLD-CCNC: 6 U/L — LOW (ref 10–45)
ANION GAP SERPL CALC-SCNC: 19 MMOL/L — HIGH (ref 5–17)
APTT BLD: 46.8 SEC — HIGH (ref 27.5–35.5)
AST SERPL-CCNC: 17 U/L — SIGNIFICANT CHANGE UP (ref 10–40)
BILIRUB SERPL-MCNC: 0.4 MG/DL — SIGNIFICANT CHANGE UP (ref 0.2–1.2)
BLD GP AB SCN SERPL QL: NEGATIVE — SIGNIFICANT CHANGE UP
BUN SERPL-MCNC: 46 MG/DL — HIGH (ref 7–23)
CALCIUM SERPL-MCNC: 9.8 MG/DL — SIGNIFICANT CHANGE UP (ref 8.4–10.5)
CHLORIDE SERPL-SCNC: 90 MMOL/L — LOW (ref 96–108)
CO2 SERPL-SCNC: 23 MMOL/L — SIGNIFICANT CHANGE UP (ref 22–31)
CREAT SERPL-MCNC: 6.48 MG/DL — HIGH (ref 0.5–1.3)
ESTIMATED AVERAGE GLUCOSE: 143 MG/DL — HIGH (ref 68–114)
GLUCOSE BLDC GLUCOMTR-MCNC: 140 MG/DL — HIGH (ref 70–99)
GLUCOSE BLDC GLUCOMTR-MCNC: 162 MG/DL — HIGH (ref 70–99)
GLUCOSE SERPL-MCNC: 174 MG/DL — HIGH (ref 70–99)
HCT VFR BLD CALC: 42.7 % — SIGNIFICANT CHANGE UP (ref 39–50)
HGB BLD-MCNC: 13.3 G/DL — SIGNIFICANT CHANGE UP (ref 13–17)
INR BLD: 1.17 RATIO — HIGH (ref 0.88–1.16)
MCHC RBC-ENTMCNC: 27.6 PG — SIGNIFICANT CHANGE UP (ref 27–34)
MCHC RBC-ENTMCNC: 31.1 GM/DL — LOW (ref 32–36)
MCV RBC AUTO: 88.6 FL — SIGNIFICANT CHANGE UP (ref 80–100)
NRBC # BLD: 0 /100 WBCS — SIGNIFICANT CHANGE UP (ref 0–0)
PA ADP PRP-ACNC: 238 PRU — SIGNIFICANT CHANGE UP (ref 194–417)
PLATELET # BLD AUTO: 222 K/UL — SIGNIFICANT CHANGE UP (ref 150–400)
POTASSIUM SERPL-MCNC: 5 MMOL/L — SIGNIFICANT CHANGE UP (ref 3.5–5.3)
POTASSIUM SERPL-SCNC: 5 MMOL/L — SIGNIFICANT CHANGE UP (ref 3.5–5.3)
PROT SERPL-MCNC: 8 G/DL — SIGNIFICANT CHANGE UP (ref 6–8.3)
PROTHROM AB SERPL-ACNC: 13.8 SEC — HIGH (ref 10.6–13.6)
RBC # BLD: 4.82 M/UL — SIGNIFICANT CHANGE UP (ref 4.2–5.8)
RBC # FLD: 17.3 % — HIGH (ref 10.3–14.5)
RH IG SCN BLD-IMP: POSITIVE — SIGNIFICANT CHANGE UP
SODIUM SERPL-SCNC: 132 MMOL/L — LOW (ref 135–145)
WBC # BLD: 6.85 K/UL — SIGNIFICANT CHANGE UP (ref 3.8–10.5)
WBC # FLD AUTO: 6.85 K/UL — SIGNIFICANT CHANGE UP (ref 3.8–10.5)

## 2020-08-07 PROCEDURE — 99222 1ST HOSP IP/OBS MODERATE 55: CPT

## 2020-08-07 PROCEDURE — 99152 MOD SED SAME PHYS/QHP 5/>YRS: CPT

## 2020-08-07 PROCEDURE — 93458 L HRT ARTERY/VENTRICLE ANGIO: CPT | Mod: 26

## 2020-08-07 PROCEDURE — 99232 SBSQ HOSP IP/OBS MODERATE 35: CPT

## 2020-08-07 PROCEDURE — 93571 IV DOP VEL&/PRESS C FLO 1ST: CPT | Mod: 26,LD

## 2020-08-07 RX ORDER — LEVETIRACETAM 250 MG/1
125 TABLET, FILM COATED ORAL
Refills: 0 | Status: DISCONTINUED | OUTPATIENT
Start: 2020-08-10 | End: 2020-08-13

## 2020-08-07 RX ORDER — ATORVASTATIN CALCIUM 80 MG/1
80 TABLET, FILM COATED ORAL AT BEDTIME
Refills: 0 | Status: DISCONTINUED | OUTPATIENT
Start: 2020-08-07 | End: 2020-08-13

## 2020-08-07 RX ORDER — INSULIN LISPRO 100/ML
VIAL (ML) SUBCUTANEOUS AT BEDTIME
Refills: 0 | Status: DISCONTINUED | OUTPATIENT
Start: 2020-08-07 | End: 2020-08-13

## 2020-08-07 RX ORDER — ALBUTEROL 90 UG/1
3 AEROSOL, METERED ORAL
Qty: 0 | Refills: 0 | DISCHARGE

## 2020-08-07 RX ORDER — LEVOTHYROXINE SODIUM 125 MCG
50 TABLET ORAL DAILY
Refills: 0 | Status: DISCONTINUED | OUTPATIENT
Start: 2020-08-07 | End: 2020-08-13

## 2020-08-07 RX ORDER — GLUCAGON INJECTION, SOLUTION 0.5 MG/.1ML
1 INJECTION, SOLUTION SUBCUTANEOUS ONCE
Refills: 0 | Status: DISCONTINUED | OUTPATIENT
Start: 2020-08-07 | End: 2020-08-13

## 2020-08-07 RX ORDER — INSULIN LISPRO 100/ML
2 VIAL (ML) SUBCUTANEOUS
Refills: 0 | Status: DISCONTINUED | OUTPATIENT
Start: 2020-08-07 | End: 2020-08-08

## 2020-08-07 RX ORDER — SIMVASTATIN 20 MG/1
1 TABLET, FILM COATED ORAL
Qty: 0 | Refills: 0 | DISCHARGE

## 2020-08-07 RX ORDER — SODIUM CHLORIDE 9 MG/ML
1000 INJECTION, SOLUTION INTRAVENOUS
Refills: 0 | Status: DISCONTINUED | OUTPATIENT
Start: 2020-08-07 | End: 2020-08-13

## 2020-08-07 RX ORDER — INSULIN GLARGINE 100 [IU]/ML
12 INJECTION, SOLUTION SUBCUTANEOUS
Qty: 0 | Refills: 0 | DISCHARGE

## 2020-08-07 RX ORDER — HEPARIN SODIUM 5000 [USP'U]/ML
5000 INJECTION INTRAVENOUS; SUBCUTANEOUS EVERY 6 HOURS
Refills: 0 | Status: DISCONTINUED | OUTPATIENT
Start: 2020-08-07 | End: 2020-08-11

## 2020-08-07 RX ORDER — ESCITALOPRAM OXALATE 10 MG/1
1 TABLET, FILM COATED ORAL
Qty: 0 | Refills: 0 | DISCHARGE

## 2020-08-07 RX ORDER — SODIUM CHLORIDE 9 MG/ML
3 INJECTION INTRAMUSCULAR; INTRAVENOUS; SUBCUTANEOUS EVERY 8 HOURS
Refills: 0 | Status: DISCONTINUED | OUTPATIENT
Start: 2020-08-07 | End: 2020-08-13

## 2020-08-07 RX ORDER — SENNA PLUS 8.6 MG/1
1 TABLET ORAL AT BEDTIME
Refills: 0 | Status: DISCONTINUED | OUTPATIENT
Start: 2020-08-07 | End: 2020-08-13

## 2020-08-07 RX ORDER — DEXTROSE 50 % IN WATER 50 %
12.5 SYRINGE (ML) INTRAVENOUS ONCE
Refills: 0 | Status: DISCONTINUED | OUTPATIENT
Start: 2020-08-07 | End: 2020-08-13

## 2020-08-07 RX ORDER — SEVELAMER CARBONATE 2400 MG/1
1 POWDER, FOR SUSPENSION ORAL
Qty: 0 | Refills: 0 | DISCHARGE

## 2020-08-07 RX ORDER — LACOSAMIDE 50 MG/1
1 TABLET ORAL
Qty: 0 | Refills: 0 | DISCHARGE

## 2020-08-07 RX ORDER — HEPARIN SODIUM 5000 [USP'U]/ML
INJECTION INTRAVENOUS; SUBCUTANEOUS
Qty: 25000 | Refills: 0 | Status: DISCONTINUED | OUTPATIENT
Start: 2020-08-07 | End: 2020-08-11

## 2020-08-07 RX ORDER — LEVETIRACETAM 250 MG/1
250 TABLET, FILM COATED ORAL
Refills: 0 | Status: DISCONTINUED | OUTPATIENT
Start: 2020-08-07 | End: 2020-08-13

## 2020-08-07 RX ORDER — TELMISARTAN 20 MG/1
1 TABLET ORAL
Qty: 0 | Refills: 0 | DISCHARGE

## 2020-08-07 RX ORDER — CARVEDILOL PHOSPHATE 80 MG/1
6.25 CAPSULE, EXTENDED RELEASE ORAL EVERY 12 HOURS
Refills: 0 | Status: DISCONTINUED | OUTPATIENT
Start: 2020-08-07 | End: 2020-08-08

## 2020-08-07 RX ORDER — HEPARIN SODIUM 5000 [USP'U]/ML
2500 INJECTION INTRAVENOUS; SUBCUTANEOUS EVERY 6 HOURS
Refills: 0 | Status: DISCONTINUED | OUTPATIENT
Start: 2020-08-07 | End: 2020-08-11

## 2020-08-07 RX ORDER — DEXTROSE 50 % IN WATER 50 %
15 SYRINGE (ML) INTRAVENOUS ONCE
Refills: 0 | Status: DISCONTINUED | OUTPATIENT
Start: 2020-08-07 | End: 2020-08-13

## 2020-08-07 RX ORDER — IPRATROPIUM BROMIDE 0.2 MG/ML
1 SOLUTION, NON-ORAL INHALATION
Qty: 0 | Refills: 0 | DISCHARGE

## 2020-08-07 RX ORDER — INSULIN LISPRO 100/ML
VIAL (ML) SUBCUTANEOUS
Refills: 0 | Status: DISCONTINUED | OUTPATIENT
Start: 2020-08-07 | End: 2020-08-13

## 2020-08-07 RX ORDER — DEXTROSE 50 % IN WATER 50 %
25 SYRINGE (ML) INTRAVENOUS ONCE
Refills: 0 | Status: DISCONTINUED | OUTPATIENT
Start: 2020-08-07 | End: 2020-08-13

## 2020-08-07 RX ORDER — FUROSEMIDE 40 MG
40 TABLET ORAL DAILY
Refills: 0 | Status: DISCONTINUED | OUTPATIENT
Start: 2020-08-07 | End: 2020-08-13

## 2020-08-07 RX ORDER — INSULIN GLARGINE 100 [IU]/ML
2 INJECTION, SOLUTION SUBCUTANEOUS AT BEDTIME
Refills: 0 | Status: DISCONTINUED | OUTPATIENT
Start: 2020-08-07 | End: 2020-08-08

## 2020-08-07 RX ORDER — AMLODIPINE BESYLATE 2.5 MG/1
10 TABLET ORAL DAILY
Refills: 0 | Status: DISCONTINUED | OUTPATIENT
Start: 2020-08-07 | End: 2020-08-13

## 2020-08-07 RX ADMIN — ATORVASTATIN CALCIUM 80 MILLIGRAM(S): 80 TABLET, FILM COATED ORAL at 23:27

## 2020-08-07 RX ADMIN — LEVETIRACETAM 250 MILLIGRAM(S): 250 TABLET, FILM COATED ORAL at 23:28

## 2020-08-07 RX ADMIN — INSULIN GLARGINE 2 UNIT(S): 100 INJECTION, SOLUTION SUBCUTANEOUS at 23:28

## 2020-08-07 RX ADMIN — HEPARIN SODIUM 1100 UNIT(S)/HR: 5000 INJECTION INTRAVENOUS; SUBCUTANEOUS at 23:00

## 2020-08-07 RX ADMIN — SODIUM CHLORIDE 3 MILLILITER(S): 9 INJECTION INTRAMUSCULAR; INTRAVENOUS; SUBCUTANEOUS at 23:29

## 2020-08-07 RX ADMIN — CARVEDILOL PHOSPHATE 6.25 MILLIGRAM(S): 80 CAPSULE, EXTENDED RELEASE ORAL at 23:28

## 2020-08-07 NOTE — H&P CARDIOLOGY - PMH
CAD (Coronary Artery Disease)  with Stents in 06/2009 and 6/2019  CRI (Chronic Renal Insufficiency)    CVA (cerebral vascular accident)  12/13/19 with residual bilateral weakness  Diabetes    Dyslipidemia    Hypertension    Hypothyroidism Anemia    CAD (Coronary Artery Disease)  with Stents in 06/2009 and 6/2019  CRI (Chronic Renal Insufficiency)    CVA (cerebral vascular accident)  12/13/19 with residual bilateral weakness  Diabetes    Dyslipidemia    Hypertension    Hypothyroidism    Intubation of airway performed without difficulty  Dec 2019  CVA c/b status epilepticus requiring intubation and PEG in 12/2019-  PEG (percutaneous endoscopic gastrostomy) status  removed July 2020

## 2020-08-07 NOTE — CONSULT NOTE ADULT - ASSESSMENT
64y Male PMH HTN, LAD stent 2009, 2019, carotid stenosis, ESRD on HD, DM, CVA, dysphagia s/p PEG, atrial fibrillation, anemia and GI bleed. 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  Consultation be obtained for surgical opinion and coronary artery bypass grafting.

## 2020-08-07 NOTE — CONSULT NOTE ADULT - SUBJECTIVE AND OBJECTIVE BOX
[facility]   MD NINA MASON MD RUORU WONG, PA    TEL:  OFFICE: [phone]  DR HSU CELL: [phone]  RAS MORELOS CELL: [phone]  DR. MARQUEZ CELL: [phone]  DR. ALEX CELl: [phone]    FROM 5 PM- 7 AM PLEASE CALL ANSWERING SERVICE AT [phone]    -- INITIAL RENAL CONSULT NOTE  --------------------------------------------------------------------------------  HPI:  65 yo M w/ PMH HTN, CAD s/p LAD stent 2009, 2019, carotid stenosis, ESRD on HD ([facility] Dr Cardenas, [place] dialysis center- on transplant list), DM (on IV insulin, controlled, managed by Jamie Corral), CVA c/b status epilepticus requiring intubation and PEG in 12/2019-went to rehab, peg was removed July 2020, dysphagia s/p PEG, atrial fibrillation (on eliquis last dose 8/5/2020), anemia and GI bleed (Feb 2020) who presents today for cardiac angiogram. Patient reports cardiac evaluation for renal transplant- pharm stress test completed on 7/31/2020, revealing abnormal study: there is medium sized, moderate to severe defects in mid to distal anterior, mid to distal anterolateral walls predominantly reversible consistent with infarction with sig.. zackery- infarct ischemia. LVEF 45%. Recommended for cardiac angiogram for further ischemic evaluation   nephrology consulted for ESRD management  PT goes to [facility] on [facility] schedule     Son [phone]        PAST HISTORY  --------------------------------------------------------------------------------  PAST MEDICAL & SURGICAL HISTORY:  Intubation of airway performed without difficulty: Dec 2019  CVA c/b status epilepticus requiring intubation and PEG in 12/2019-  PEG (percutaneous endoscopic gastrostomy) status: removed July 2020  Anemia  CVA (cerebral vascular accident): 12/13/19 with residual bilateral weakness  Hypothyroidism  CRI (Chronic Renal Insufficiency)  CAD (Coronary Artery Disease): with Stents in 06/2009 and 6/2019  Dyslipidemia  Diabetes  Hypertension  History of insertion of stent into coronary artery bypass graft: 2009 and 2019    FAMILY HISTORY:    PAST SOCIAL HISTORY:    ALLERGIES & MEDICATIONS  --------------------------------------------------------------------------------  Allergies    No Known Allergies    Intolerances      Standing Inpatient Medications  sodium chloride 0.9% lock flush 3 milliLiter(s) IV Push every 8 hours    PRN Inpatient Medications      REVIEW OF SYSTEMS  --------------------------------------------------------------------------------  Gen: No fevers/chills  Skin: No rashes  Head/Eyes/Ears: Normal hearing,  Normal vision   Respiratory: No dyspnea, cough  CV: No chest pain  GI: No abdominal pain, diarrhea, constipation, nausea, vomiting  : No dysuria, hematuria  MSK: No  edema  Heme: No easy bruising or bleeding  Psych: No significant depression    All other systems were reviewed and are negative, except as noted.    VITALS/PHYSICAL EXAM  --------------------------------------------------------------------------------  T(C): 36.5 (08-07-20 @ 06:50), Max: 36.5 (08-07-20 @ 06:50)  HR: 75 (08-07-20 @ 06:50) (75 - 75)  BP: 188/89 (08-07-20 @ 06:50) (188/89 - 188/89)  RR: 12 (08-07-20 @ 06:50) (12 - 12)  SpO2: 100% (08-07-20 @ 06:50) (100% - 100%)  Wt(kg): --  Height (cm): 177.8 (08-07-20 @ 07:24)  Weight (kg): 62 (08-07-20 @ 07:24)  BMI (kg/m2): 19.6 (08-07-20 @ 07:24)  BSA (m2): 1.78 (08-07-20 @ 07:24)      Physical Exam:  	Gen: NAD  	HEENT: MMM  	Pulm: CTA B/L  	CV: S1S2  	Abd: Soft, +BS   	Ext: No LE edema B/L  	Neuro: Awake, alert  	Skin: Warm and dry  	Vascular access:          : no garrett  LABS/STUDIES  --------------------------------------------------------------------------------              13.3   6.85  >-----------<  222      [08-07-20 @ 07:11]              42.7     132  |  90  |  46  ----------------------------<  174      [08-07-20 @ 07:11]  5.0   |  23  |  6.48        Ca     9.8     [08-07-20 @ 07:11]    TPro  8.0  /  Alb  4.1  /  TBili  0.4  /  DBili  x   /  AST  17  /  ALT  6   /  AlkPhos  91  [08-07-20 @ 07:11]          Creatinine Trend:  SCr 6.48 [08-07 @ 07:11]    Urinalysis - [01-02-20 @ 20:34]      Color Light Yellow / Appearance Slightly Turbid / SG 1.012 / pH 7.0      Gluc 100 mg/dL / Ketone Negative  / Bili Negative / Urobili Negative       Blood Small / Protein 300 mg/dL / Leuk Est Moderate / Nitrite Negative      RBC 1 / WBC 46 / Hyaline 1 / Gran  / Sq Epi  / Non Sq Epi 0 / Bacteria Many      Iron 45, TIBC 145, %sat 31      [12-26-19 @ 23:24]  Ferritin 1369      [12-26-19 @ 23:24]  PTH -- (Ca 8.4)      [02-21-20 @ 08:54]   93  HbA1c 6.0      [02-21-20 @ 08:53]  TSH 6.22      [01-03-20 @ 10:03]  Lipid: chol 134, , HDL 56, LDL 52      [12-15-19 @ 10:56]    HBsAb >1000.0      [12-14-19 @ 23:48]  HBsAb Reactive      [03-13-17 @ 19:00]  HBsAg Nonreact      [12-14-19 @ 23:48]  HBcAb Reactive      [06-03-19 @ 15:57]  HCV 0.15, Nonreact      [12-14-19 @ 23:48]  HIV Nonreact      [03-13-17 @ 19:00] [facility] [place] [facility]   MD NINA MASON MD RUORU WONG, PA    TEL:  OFFICE: [phone]  DR HSU CELL: [phone]  RAS MORELOS CELL: [phone]  DR. MARQUEZ CELL: [phone]  DR. ALEX CELl: [phone]    FROM 5 PM- 7 AM PLEASE CALL ANSWERING SERVICE AT [phone]    -- INITIAL RENAL CONSULT NOTE  --------------------------------------------------------------------------------  HPI:  65 yo M w/ PMH HTN, CAD s/p LAD stent 2009, 2019, carotid stenosis, ESRD on HD ([facility] Dr Cardenas, [place] dialysis [facility]- on transplant list), DM (on IV insulin, controlled, managed by Jamie Corral), CVA c/b status epilepticus requiring intubation and PEG in 12/2019-went to rehab, peg was removed July 2020, dysphagia s/p PEG, atrial fibrillation (on eliquis last dose 8/5/2020), anemia and GI bleed (Feb 2020) who presents today for cardiac angiogram. Patient reports cardiac evaluation for renal transplant- pharm stress test completed on 7/31/2020, revealing abnormal study: there is medium sized, moderate to severe defects in mid to distal anterior, mid to distal anterolateral walls predominantly reversible consistent with infarction with sig.. zackery- infarct ischemia. LVEF 45%. Recommended for cardiac angiogram for further ischemic evaluation   nephrology consulted for ESRD management  PT goes to [facility] on [facility] schedule     Son [phone]        PAST HISTORY  --------------------------------------------------------------------------------  PAST MEDICAL & SURGICAL HISTORY:  Intubation of airway performed without difficulty: Dec 2019  CVA c/b status epilepticus requiring intubation and PEG in 12/2019-  PEG (percutaneous endoscopic gastrostomy) status: removed July 2020  Anemia  CVA (cerebral vascular accident): 12/13/19 with residual bilateral weakness  Hypothyroidism  CRI (Chronic Renal Insufficiency)  CAD (Coronary Artery Disease): with Stents in 06/2009 and 6/2019  Dyslipidemia  Diabetes  Hypertension  History of insertion of stent into coronary artery bypass graft: 2009 and 2019    FAMILY HISTORY:    PAST SOCIAL HISTORY:    ALLERGIES & MEDICATIONS  --------------------------------------------------------------------------------  Allergies    No Known Allergies    Intolerances      Standing Inpatient Medications  sodium chloride 0.9% lock flush 3 milliLiter(s) IV Push every 8 hours    PRN Inpatient Medications      REVIEW OF SYSTEMS  --------------------------------------------------------------------------------  Gen: No fevers/chills  Skin: No rashes  Head/Eyes/Ears: Normal hearing,  Normal vision   Respiratory: No dyspnea, cough  CV: No chest pain  GI: No abdominal pain,   : No dysuria, hematuria  MSK: No  edema  Heme: No easy bruising or bleeding  Psych: No significant depression    All other systems were reviewed and are negative, except as noted.    VITALS/PHYSICAL EXAM  --------------------------------------------------------------------------------  T(C): 36.5 (08-07-20 @ 06:50), Max: 36.5 (08-07-20 @ 06:50)  HR: 75 (08-07-20 @ 06:50) (75 - 75)  BP: 188/89 (08-07-20 @ 06:50) (188/89 - 188/89)  RR: 12 (08-07-20 @ 06:50) (12 - 12)  SpO2: 100% (08-07-20 @ 06:50) (100% - 100%)  Wt(kg): --  Height (cm): 177.8 (08-07-20 @ 07:24)  Weight (kg): 62 (08-07-20 @ 07:24)  BMI (kg/m2): 19.6 (08-07-20 @ 07:24)  BSA (m2): 1.78 (08-07-20 @ 07:24)      Physical Exam:  	Gen: NAD  	HEENT: MMM  	Pulm: CTA B/L  	CV: S1S2  	Abd: Soft, +BS   	Ext: No LE edema B/L  	Neuro: Awake,   	Skin: Warm and dry  	Vascular access: perma cath          : no tucker  LABS/STUDIES  --------------------------------------------------------------------------------              13.3   6.85  >-----------<  222      [08-07-20 @ 07:11]              42.7     132  |  90  |  46  ----------------------------<  174      [08-07-20 @ 07:11]  5.0   |  23  |  6.48        Ca     9.8     [08-07-20 @ 07:11]    TPro  8.0  /  Alb  4.1  /  TBili  0.4  /  DBili  x   /  AST  17  /  ALT  6   /  AlkPhos  91  [08-07-20 @ 07:11]          Creatinine Trend:  SCr 6.48 [08-07 @ 07:11]    Urinalysis - [01-02-20 @ 20:34]      Color Light Yellow / Appearance Slightly Turbid / SG 1.012 / pH 7.0      Gluc 100 mg/dL / Ketone Negative  / Bili Negative / Urobili Negative       Blood Small / Protein 300 mg/dL / Leuk Est Moderate / Nitrite Negative      RBC 1 / WBC 46 / Hyaline 1 / Gran  / Sq Epi  / Non Sq Epi 0 / Bacteria Many      Iron 45, TIBC 145, %sat 31      [12-26-19 @ 23:24]  Ferritin 1369      [12-26-19 @ 23:24]  PTH -- (Ca 8.4)      [02-21-20 @ 08:54]   93  HbA1c 6.0      [02-21-20 @ 08:53]  TSH 6.22      [01-03-20 @ 10:03]  Lipid: chol 134, , HDL 56, LDL 52      [12-15-19 @ 10:56]    HBsAb >1000.0      [12-14-19 @ 23:48]  HBsAb Reactive      [03-13-17 @ 19:00]  HBsAg Nonreact      [12-14-19 @ 23:48]  HBcAb Reactive      [06-03-19 @ 15:57]  HCV 0.15, Nonreact      [12-14-19 @ 23:48]  HIV Nonreact      [03-13-17 @ 19:00]

## 2020-08-07 NOTE — CONSULT NOTE ADULT - SUBJECTIVE AND OBJECTIVE BOX
History of Present Illness:  64y Male PMH HTN, LAD stent 2009, 2019, carotid stenosis, ESRD on HD, DM, CVA, dysphagia s/p PEG, atrial fibrillation, anemia and GI bleed. Cardiac evaluation for renal transplant reveals moderate to severe defects in mid to distal anterior, mid to distal anterolateral walls on stress test and cardiac angiogram demonstrates  left main 70 % stenosis. Ostial LAD:75 % stenosis. Mid circumflex:  40 % stenosis.   There was a 0 % stenosis at the site of a prior stent. RCA Consultation be obtained for surgical opinion  and coronary artery bypass grafting.        Past Medical History  Intubation of airway performed without difficulty: Dec 2019  CVA c/b status epilepticus requiring intubation and PEG in 12/2019-  PEG (percutaneous endoscopic gastrostomy) status: removed July 2020  Anemia  CVA (cerebral vascular accident): 12/13/19 with residual bilateral weakness  Hypothyroidism  CRI (Chronic Renal Insufficiency)  CAD (Coronary Artery Disease): with Stents in 06/2009 and 6/2019  CAD (Coronary Artery Disease)  Dyslipidemia  Diabetes  Hypertension      Past Surgical History  History of insertion of stent into coronary artery bypass graft: 2009 and 2019  No significant past surgical history      MEDICATIONS  (STANDING):  atorvastatin 80 milliGRAM(s) Oral at bedtime  carvedilol 6.25 milliGRAM(s) Oral every 12 hours  dextrose 5%. 1000 milliLiter(s) (50 mL/Hr) IV Continuous <Continuous>  dextrose 50% Injectable 12.5 Gram(s) IV Push once  dextrose 50% Injectable 25 Gram(s) IV Push once  dextrose 50% Injectable 25 Gram(s) IV Push once  furosemide    Tablet 40 milliGRAM(s) Oral daily  insulin glargine Injectable (LANTUS) 2 Unit(s) SubCutaneous at bedtime  insulin lispro (HumaLOG) corrective regimen sliding scale   SubCutaneous three times a day before meals  insulin lispro (HumaLOG) corrective regimen sliding scale   SubCutaneous at bedtime  insulin lispro Injectable (HumaLOG) 2 Unit(s) SubCutaneous three times a day before meals  levothyroxine 50 MICROGram(s) Oral daily  multivitamin 1 Tablet(s) Oral daily  senna 1 Tablet(s) Oral at bedtime  sodium chloride 0.9% lock flush 3 milliLiter(s) IV Push every 8 hours    MEDICATIONS  (PRN):  dextrose 40% Gel 15 Gram(s) Oral once PRN Blood Glucose LESS THAN 70 milliGRAM(s)/deciliter  glucagon  Injectable 1 milliGRAM(s) IntraMuscular once PRN Glucose LESS THAN 70 milligrams/deciliter    Antiplatelet therapy:                           Last dose/amt:    Allergies: No Known Allergies      SOCIAL HISTORY:  Smoker: [ ] Yes  [ ] No        PACK YEARS:                         WHEN QUIT?  ETOH use: [ ] Yes  [ ] No              FREQUENCY / QUANTITY:  Ilicit Drug use:  [ ] Yes  [ ] No  Occupation:  Live with:  Assist device use:    Relevant Family History  FAMILY HISTORY:      Review of Systems  GENERAL:  Fevers[] chills[] sweats[] fatigue[] weight loss[] weight gain []                                        NEURO:  parathesias[] seizures []  syncope []  confusion []                                                                                  EYES: glasses[]  blurry vision[]  discharge[] pain[] glaucoma []                                                                            ENMT:  difficulty hearing []  vertigo[]  dysphagia[] epistaxis[] recent dental work []                                      CV:  chest pain[] palpitations[] COLE [] diaphoresis [] edema[]                                                                                             RESPIRATORY:  wheezing[] SOB[] cough [] sputum[] hemoptysis[]                                                                    GI:  nausea[]  vomiting []  diarrhea[] constipation [] melena []                                                                        : hematuria[ ]  dysuria[ ] urgency[] incontinence[]                                                                                              MUSKULOSKELETAL:  arthritis[ ]  joint swelling [ ] muscle weakness [ ]                                                                  SKIN/BREAST:  rash[ ] itching [ ]  hair loss[ ] masses[ ]                                                                                                PSYCH:  dementia [ ] depresion [ ] anxiety[ ]                                                                                                                  HEME/LYMPH:  bruises easily[ ] enlarged lymph nodes[ ] tender lymph nodes[ ]                                                 ENDOCRINE:  cold intolerance[ ] heat intolerance[ ] polydipsia[ ]                                                                              PHYSICAL EXAM  Vital Signs Last 24 Hrs  T(C): 36.5 (07 Aug 2020 06:50), Max: 36.5 (07 Aug 2020 06:50)  T(F): 97.7 (07 Aug 2020 06:50), Max: 97.7 (07 Aug 2020 06:50)  HR: 75 (07 Aug 2020 06:50) (75 - 75)  BP: 188/89 (07 Aug 2020 06:50) (188/89 - 188/89)  BP(mean): 122 (07 Aug 2020 06:50) (122 - 122)  RR: 12 (07 Aug 2020 06:50) (12 - 12)  SpO2: 100% (07 Aug 2020 06:50) (100% - 100%)    General: Well nourished, well developed, no acute distress.                                                         Neuro: Normal exam oriented to person/place & time with no focal motor or sensory  deficits.                    Eyes: Normal exam of conjunctiva & lids, pupils equally reactive.   ENT: Normal exam of nasal/oral mucosa with absence of cyanosis.   Neck: Normal exam of jugular veins, trachea & thyroid.   Chest: Normal lung exam with good air movement absence of wheezes, rales, or rhonchi:                                                                          CV:  Auscultation: normal [ ] S3[ ] S4[ ] Irregular [ ] Rub[ ] Clicks[ ]  Murmurs none:[ ]systolic [ ]  diastolic [ ] holosystolic [ ]  Carotids: No Bruits[ ] Other____________ Abdominal Aorta: normal [ ] nonpalpable[ ]                                                                         GI: Normal exam of abdomen, liver & spleen with no noted masses or tenderness.                                                                                              Extremities: Normal no evidence of cyanosis or deformity Edema: none[ ]trace[ ]1+[ ]2+[ ]3+[ ]4+[ ]  Lower Extremity Pulses: Right[ ] Left[ ]Varicosities[ ]  SKIN : Normal exam to inspection & palpation.                                                           LABS:                        13.3   6.85  )-----------( 222      ( 07 Aug 2020 07:11 )             42.7     08-07    132<L>  |  90<L>  |  46<H>  ----------------------------<  174<H>  5.0   |  23  |  6.48<H>    Ca    9.8      07 Aug 2020 07:11    TPro  8.0  /  Alb  4.1  /  TBili  0.4  /  DBili  x   /  AST  17  /  ALT  6<L>  /  AlkPhos  91  08-07                Cardiac Cath: CORONARY VESSELS: Distal left main: There was a 70 % stenosis. Ostial LAD:  There was a 75 % stenosis. Mid circumflex: There was a 40 % stenosis. OM1:  There was a 0 % stenosis at the site of a prior stent. RCA: The vessel was  very small sized. Angiography showed severe atherosclerosis.  CARDIAC STRUCTURES: Global left ventricular function was normal. EF  estimated was 60 %.      TTE / DINAH: < from: Transthoracic Echocardiogram (01.03.20 @ 18:46) >  < from: Transthoracic Echocardiogram (01.03.20 @ 18:46) >      < end of copied text >      < end of copied text > History of Present Illness:  64y Male PMH HTN, LAD stent 2009, 2019, carotid stenosis, ESRD on HD, DM, CVA, dysphagia s/p PEG, atrial fibrillation, anemia and GI bleed. Cardiac evaluation for renal transplant reveals moderate to severe defects in mid to distal anterior, mid to distal anterolateral walls on stress test and cardiac angiogram demonstrates  left main 70 % stenosis. Ostial LAD:75 % stenosis. Mid circumflex:  40 % stenosis.   There was a 0 % stenosis at the site of a prior stent. RCA Consultation be obtained for surgical opinion  and coronary artery bypass grafting.        Past Medical History  Intubation of airway performed without difficulty: Dec 2019  CVA c/b status epilepticus requiring intubation and PEG in 12/2019-  PEG (percutaneous endoscopic gastrostomy) status: removed July 2020  Anemia  CVA (cerebral vascular accident): 12/13/19 with residual bilateral weakness  Hypothyroidism  CRI (Chronic Renal Insufficiency)  CAD (Coronary Artery Disease): with Stents in 06/2009 and 6/2019  CAD (Coronary Artery Disease)  Dyslipidemia  Diabetes  Hypertension      Past Surgical History  History of insertion of stent into coronary artery bypass graft: 2009 and 2019  No significant past surgical history      MEDICATIONS  (STANDING):  atorvastatin 80 milliGRAM(s) Oral at bedtime  carvedilol 6.25 milliGRAM(s) Oral every 12 hours  dextrose 5%. 1000 milliLiter(s) (50 mL/Hr) IV Continuous <Continuous>  dextrose 50% Injectable 12.5 Gram(s) IV Push once  dextrose 50% Injectable 25 Gram(s) IV Push once  dextrose 50% Injectable 25 Gram(s) IV Push once  furosemide    Tablet 40 milliGRAM(s) Oral daily  insulin glargine Injectable (LANTUS) 2 Unit(s) SubCutaneous at bedtime  insulin lispro (HumaLOG) corrective regimen sliding scale   SubCutaneous three times a day before meals  insulin lispro (HumaLOG) corrective regimen sliding scale   SubCutaneous at bedtime  insulin lispro Injectable (HumaLOG) 2 Unit(s) SubCutaneous three times a day before meals  levothyroxine 50 MICROGram(s) Oral daily  multivitamin 1 Tablet(s) Oral daily  senna 1 Tablet(s) Oral at bedtime  sodium chloride 0.9% lock flush 3 milliLiter(s) IV Push every 8 hours    MEDICATIONS  (PRN):  dextrose 40% Gel 15 Gram(s) Oral once PRN Blood Glucose LESS THAN 70 milliGRAM(s)/deciliter  glucagon  Injectable 1 milliGRAM(s) IntraMuscular once PRN Glucose LESS THAN 70 milligrams/deciliter    Antiplatelet therapy:   eliquis                        Last dose/amt:    Allergies: No Known Allergies      SOCIAL HISTORY:  Smoker: [ ] Yes  [x ] No        PACK YEARS:                         WHEN QUIT?  ETOH use: [ ] Yes  [x ] No              FREQUENCY / QUANTITY:  Ilicit Drug use:  [ ] Yes  [ x] No  Occupation:  Live with: children  Assist device use: cane    Relevant Family History  FAMILY HISTORY:      Review of Systems  GENERAL:  Fevers[] chills[] sweats[] fatigue[] weight loss[] weight gain []                                        NEURO:  parathesias[] seizures []  syncope []  confusion []                                                                                  EYES: glasses[]  blurry vision[]  discharge[] pain[] glaucoma []                                                                            ENMT:  difficulty hearing []  vertigo[]  dysphagia[] epistaxis[] recent dental work []                                      CV:  chest pain[] palpitations[] COLE [] diaphoresis [] edema[]                                                                                             RESPIRATORY:  wheezing[] SOB[] cough [] sputum[] hemoptysis[]                                                                    GI:  nausea[]  vomiting []  diarrhea[] constipation [] melena []                                                                        : hematuria[ ]  dysuria[ ] urgency[] incontinence[]                                                                                              MUSKULOSKELETAL:  arthritis[ ]  joint swelling [ ] muscle weakness [ ]                                                                  SKIN/BREAST:  rash[ ] itching [ ]  hair loss[ ] masses[ ]                                                                                                PSYCH:  dementia [ ] depresion [ ] anxiety[ ]                                                                                                                  HEME/LYMPH:  bruises easily[ ] enlarged lymph nodes[ ] tender lymph nodes[ ]                                                 ENDOCRINE:  cold intolerance[ ] heat intolerance[ ] polydipsia[ ]                                                                              PHYSICAL EXAM  Vital Signs Last 24 Hrs  T(C): 36.5 (07 Aug 2020 06:50), Max: 36.5 (07 Aug 2020 06:50)  T(F): 97.7 (07 Aug 2020 06:50), Max: 97.7 (07 Aug 2020 06:50)  HR: 75 (07 Aug 2020 06:50) (75 - 75)  BP: 188/89 (07 Aug 2020 06:50) (188/89 - 188/89)  BP(mean): 122 (07 Aug 2020 06:50) (122 - 122)  RR: 12 (07 Aug 2020 06:50) (12 - 12)  SpO2: 100% (07 Aug 2020 06:50) (100% - 100%)    General: Well nourished, well developed, no acute distress.                                                         Neuro: Normal exam oriented to person/place & time with no focal motor or sensory  deficits.                    Eyes: Normal exam of conjunctiva & lids, pupils equally reactive.   ENT: Normal exam of nasal/oral mucosa with absence of cyanosis.   Neck: Normal exam of jugular veins, trachea & thyroid.   Chest: Normal lung exam with good air movement absence of wheezes, rales, or rhonchi:                                                                          CV:  Auscultation: normal x[ ] S3[ ] S4[ ] Irregular [ ] Rub[ ] Clicks[ ]  Murmurs none:[x ]systolic [ ]  diastolic [ ] holosystolic [ ]  Carotids: No Bruits[ ] Other____________ Abdominal Aorta: normal [ ] nonpalpable[ ]                                                                         GI: Normal exam of abdomen, liver & spleen with no noted masses or tenderness.                                                                                              Extremities: Normal no evidence of cyanosis or deformity Edema: none[x ]trace[ ]1+[ ]2+[ ]3+[ ]4+[ ]  Lower Extremity Pulses: Right[x] Left[x ]Varicosities[ ]  SKIN : Normal exam to inspection & palpation.   rue ext weakness                                                          LABS:                        13.3   6.85  )-----------( 222      ( 07 Aug 2020 07:11 )             42.7     08-07    132<L>  |  90<L>  |  46<H>  ----------------------------<  174<H>  5.0   |  23  |  6.48<H>    Ca    9.8      07 Aug 2020 07:11    TPro  8.0  /  Alb  4.1  /  TBili  0.4  /  DBili  x   /  AST  17  /  ALT  6<L>  /  AlkPhos  91  08-07                Cardiac Cath: CORONARY VESSELS: Distal left main: There was a 70 % stenosis. Ostial LAD:  There was a 75 % stenosis. Mid circumflex: There was a 40 % stenosis. OM1:  There was a 0 % stenosis at the site of a prior stent. RCA: The vessel was  very small sized. Angiography showed severe atherosclerosis.  CARDIAC STRUCTURES: Global left ventricular function was normal. EF  estimated was 60 %.      TTE / DINAH: < from: Transthoracic Echocardiogram (01.03.20 @ 18:46) >  < from: Transthoracic Echocardiogram (01.03.20 @ 18:46) >      < end of copied text >      < end of copied text >

## 2020-08-07 NOTE — H&P CARDIOLOGY - HISTORY OF PRESENT ILLNESS
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 status epilepticus requiring intubation and PEG in 12/2019-went to rehab now on Keppra, dysphagia s/p PEG, atrial fibrillation (on eliquis), GI bleed (Feb 2020) who presents 65 yo M w/ PMH HTN, T2DM, CAD s/p LAD stent 2009, 2019, carotid stenosis, ESRD on HD (MWF Dr Cardenas, [place] [facility]- on transplant list), DM (on IV insulin, controlled, managed by Jamie Corral), CVA c/b status epilepticus requiring intubation and PEG in 12/2019-went to rehab, peg was removed July 2020, dysphagia s/p PEG, atrial fibrillation (on eliquis last dose 8/5/2020), GI bleed (Feb 2020) who presents today for cardiac angiogram. Patient reports cardiac evaluation for renal transplant- pharm stress test completed on 7/31/2020, revealing abnormal study: there is medium sized, moderate to severe defects in mid to distal anterior, mid to distal anterolateral walls predominantly reversible consistent with infarction with sig.. zackery- infarct ischemia. LVEF 45%. Recommended for cardiac angiogram for further ischemic evaluation     Son [phone] 65 yo M w/ PMH HTN, CAD s/p LAD stent 2009, 2019, carotid stenosis, ESRD on HD (MWF Dr Cardenas, [place] [facility]- on transplant list), DM (on IV insulin, controlled, managed by Jamie Corral), CVA c/b status epilepticus requiring intubation and PEG in 12/2019-went to rehab, peg was removed July 2020, dysphagia s/p PEG, atrial fibrillation (on eliquis last dose 8/5/2020), anemia and GI bleed (Feb 2020) who presents today for cardiac angiogram. Patient reports cardiac evaluation for renal transplant- pharm stress test completed on 7/31/2020, revealing abnormal study: there is medium sized, moderate to severe defects in mid to distal anterior, mid to distal anterolateral walls predominantly reversible consistent with infarction with sig.. zackery- infarct ischemia. LVEF 45%. Recommended for cardiac angiogram for further ischemic evaluation     Son [phone]

## 2020-08-07 NOTE — CONSULT NOTE ADULT - ASSESSMENT
63 yo M w/ PMH HTN, CAD s/p LAD stent 2009, 2019, carotid stenosis, ESRD on HD ([facility] Dr Cardenas, [place] [facility]- on transplant list), DM (on IV insulin, controlled, managed by Jamie Corral), CVA c/b status epilepticus requiring intubation and PEG in 12/2019-went to rehab, peg was removed July 2020, dysphagia s/p PEG, atrial fibrillation (on eliquis last dose 8/5/2020), anemia and GI bleed (Feb 2020) who presents today for cardiac angiogram. Patient reports cardiac evaluation for renal transplant- pharm stress test completed on 7/31/2020, revealing abnormal study: there is medium sized, moderate to severe defects in mid to distal anterior, mid to distal anterolateral walls predominantly reversible consistent with infarction with sig.. zackery- infarct ischemia. LVEF 45%. Recommended for cardiac angiogram for further ischemic evaluation   nephrology consulted for ESRD management  PT goes to [facility] on [facility] schedule     ESRD on HD ( [facility])  PT from [facility]  s/p cath today  Plan for HD today  Consent obtained from son at [phone]  MOnitor BMP    Anemia  Hb at goal for ESRD   Monitor at present    HTN  Bp elevated   Resume outpt meds  Low salt diet    CKD -BMD  Check PTH  Monitor serum calcium and PO4 65 yo M w/ PMH HTN, CAD s/p LAD stent 2009, 2019, carotid stenosis, ESRD on HD ([facility] Dr Cardenas, [place] [facility]- on transplant list), DM (on IV insulin, controlled, managed by Jamie Corral), CVA c/b status epilepticus requiring intubation and PEG in 12/2019-went to rehab, peg was removed July 2020, dysphagia s/p PEG, atrial fibrillation (on eliquis last dose 8/5/2020), anemia and GI bleed (Feb 2020) who presents today for cardiac angiogram. Patient reports cardiac evaluation for renal transplant- pharm stress test completed on 7/31/2020, revealing abnormal study: there is medium sized, moderate to severe defects in mid to distal anterior, mid to distal anterolateral walls predominantly reversible consistent with infarction with sig.. zackery- infarct ischemia. LVEF 45%. Recommended for cardiac angiogram for further ischemic evaluation   nephrology consulted for ESRD management  PT goes to [facility] on [facility] schedule     ESRD on HD ( [facility]) via perma cath  PT from [facility]  s/p cath today  Plan for HD today  Consent obtained from son at [phone]  MOnitor BMP    Anemia  Hb at goal for ESRD   Monitor at present    HTN  Bp elevated   Resume outpt meds  Low salt diet    CKD -BMD  Check PTH  Monitor serum calcium and PO4

## 2020-08-07 NOTE — CONSULT NOTE ADULT - PROBLEM SELECTOR RECOMMENDATION 9
echo pft, carotid doppler  Consider GI clearance prior to surgical intervention  Dr Guillory to evaluate for CABG echo pft, carotid doppler  Consider GI clearance prior to surgical intervention  Dr Guillory to evaluate for CABG; minimally invasive vs sternotomy echo pft, carotid doppler  Hold eliquis, initiate heparin gtt for LM stenosis  Dr Guillory to evaluate for CABG; minimally invasive vs sternotomy echo pft, carotid doppler  Nimo, asa statin  Hold eliquis, initiate heparin gtt for LM stenosis  Dr Guillory to evaluate for CABG; minimally invasive vs sternotomy

## 2020-08-08 DIAGNOSIS — I25.110 ATHEROSCLEROTIC HEART DISEASE OF NATIVE CORONARY ARTERY WITH UNSTABLE ANGINA PECTORIS: ICD-10-CM

## 2020-08-08 LAB
A1C WITH ESTIMATED AVERAGE GLUCOSE RESULT: 6.6 % — HIGH (ref 4–5.6)
ALBUMIN SERPL ELPH-MCNC: 3.6 G/DL — SIGNIFICANT CHANGE UP (ref 3.3–5)
ALP SERPL-CCNC: 84 U/L — SIGNIFICANT CHANGE UP (ref 40–120)
ALT FLD-CCNC: 6 U/L — LOW (ref 10–45)
ANION GAP SERPL CALC-SCNC: 14 MMOL/L — SIGNIFICANT CHANGE UP (ref 5–17)
APTT BLD: 197 SEC — CRITICAL HIGH (ref 27.5–35.5)
APTT BLD: 52.3 SEC — HIGH (ref 27.5–35.5)
APTT BLD: >200 SEC — CRITICAL HIGH (ref 27.5–35.5)
AST SERPL-CCNC: 14 U/L — SIGNIFICANT CHANGE UP (ref 10–40)
BILIRUB SERPL-MCNC: 0.3 MG/DL — SIGNIFICANT CHANGE UP (ref 0.2–1.2)
BUN SERPL-MCNC: 32 MG/DL — HIGH (ref 7–23)
CALCIUM SERPL-MCNC: 9.4 MG/DL — SIGNIFICANT CHANGE UP (ref 8.4–10.5)
CHLORIDE SERPL-SCNC: 94 MMOL/L — LOW (ref 96–108)
CHOLEST SERPL-MCNC: 124 MG/DL — SIGNIFICANT CHANGE UP (ref 10–199)
CO2 SERPL-SCNC: 25 MMOL/L — SIGNIFICANT CHANGE UP (ref 22–31)
CREAT SERPL-MCNC: 5.26 MG/DL — HIGH (ref 0.5–1.3)
ESTIMATED AVERAGE GLUCOSE: 143 MG/DL — HIGH (ref 68–114)
GLUCOSE BLDC GLUCOMTR-MCNC: 102 MG/DL — HIGH (ref 70–99)
GLUCOSE BLDC GLUCOMTR-MCNC: 110 MG/DL — HIGH (ref 70–99)
GLUCOSE BLDC GLUCOMTR-MCNC: 190 MG/DL — HIGH (ref 70–99)
GLUCOSE BLDC GLUCOMTR-MCNC: 232 MG/DL — HIGH (ref 70–99)
GLUCOSE SERPL-MCNC: 165 MG/DL — HIGH (ref 70–99)
HCT VFR BLD CALC: 42.1 % — SIGNIFICANT CHANGE UP (ref 39–50)
HDLC SERPL-MCNC: 64 MG/DL — SIGNIFICANT CHANGE UP
HGB BLD-MCNC: 13.3 G/DL — SIGNIFICANT CHANGE UP (ref 13–17)
LIPID PNL WITH DIRECT LDL SERPL: 47 MG/DL — SIGNIFICANT CHANGE UP
MCHC RBC-ENTMCNC: 27.7 PG — SIGNIFICANT CHANGE UP (ref 27–34)
MCHC RBC-ENTMCNC: 31.6 GM/DL — LOW (ref 32–36)
MCV RBC AUTO: 87.5 FL — SIGNIFICANT CHANGE UP (ref 80–100)
MRSA PCR RESULT.: SIGNIFICANT CHANGE UP
NRBC # BLD: 0 /100 WBCS — SIGNIFICANT CHANGE UP (ref 0–0)
PLATELET # BLD AUTO: 202 K/UL — SIGNIFICANT CHANGE UP (ref 150–400)
POTASSIUM SERPL-MCNC: 4.4 MMOL/L — SIGNIFICANT CHANGE UP (ref 3.5–5.3)
POTASSIUM SERPL-SCNC: 4.4 MMOL/L — SIGNIFICANT CHANGE UP (ref 3.5–5.3)
PROT SERPL-MCNC: 7.3 G/DL — SIGNIFICANT CHANGE UP (ref 6–8.3)
RBC # BLD: 4.81 M/UL — SIGNIFICANT CHANGE UP (ref 4.2–5.8)
RBC # FLD: 17.3 % — HIGH (ref 10.3–14.5)
S AUREUS DNA NOSE QL NAA+PROBE: SIGNIFICANT CHANGE UP
SODIUM SERPL-SCNC: 133 MMOL/L — LOW (ref 135–145)
T3 SERPL-MCNC: 75 NG/DL — LOW (ref 80–200)
T4 AB SER-ACNC: 5.8 UG/DL — SIGNIFICANT CHANGE UP (ref 4.6–12)
TOTAL CHOLESTEROL/HDL RATIO MEASUREMENT: 1.9 RATIO — LOW (ref 3.4–9.6)
TRIGL SERPL-MCNC: 59 MG/DL — SIGNIFICANT CHANGE UP (ref 10–149)
TSH SERPL-MCNC: 1.68 UIU/ML — SIGNIFICANT CHANGE UP (ref 0.27–4.2)
WBC # BLD: 5.6 K/UL — SIGNIFICANT CHANGE UP (ref 3.8–10.5)
WBC # FLD AUTO: 5.6 K/UL — SIGNIFICANT CHANGE UP (ref 3.8–10.5)

## 2020-08-08 PROCEDURE — 93306 TTE W/DOPPLER COMPLETE: CPT | Mod: 26

## 2020-08-08 PROCEDURE — 76376 3D RENDER W/INTRP POSTPROCES: CPT | Mod: 26

## 2020-08-08 PROCEDURE — 71045 X-RAY EXAM CHEST 1 VIEW: CPT | Mod: 26

## 2020-08-08 PROCEDURE — 71250 CT THORAX DX C-: CPT | Mod: 26

## 2020-08-08 PROCEDURE — 99232 SBSQ HOSP IP/OBS MODERATE 35: CPT

## 2020-08-08 RX ORDER — INSULIN LISPRO 100/ML
3 VIAL (ML) SUBCUTANEOUS
Refills: 0 | Status: DISCONTINUED | OUTPATIENT
Start: 2020-08-08 | End: 2020-08-13

## 2020-08-08 RX ORDER — INSULIN GLARGINE 100 [IU]/ML
4 INJECTION, SOLUTION SUBCUTANEOUS EVERY MORNING
Refills: 0 | Status: DISCONTINUED | OUTPATIENT
Start: 2020-08-08 | End: 2020-08-12

## 2020-08-08 RX ORDER — CARVEDILOL PHOSPHATE 80 MG/1
12.5 CAPSULE, EXTENDED RELEASE ORAL EVERY 12 HOURS
Refills: 0 | Status: DISCONTINUED | OUTPATIENT
Start: 2020-08-08 | End: 2020-08-13

## 2020-08-08 RX ADMIN — HEPARIN SODIUM 2500 UNIT(S): 5000 INJECTION INTRAVENOUS; SUBCUTANEOUS at 16:12

## 2020-08-08 RX ADMIN — SODIUM CHLORIDE 3 MILLILITER(S): 9 INJECTION INTRAMUSCULAR; INTRAVENOUS; SUBCUTANEOUS at 22:50

## 2020-08-08 RX ADMIN — Medication 1 TABLET(S): at 10:27

## 2020-08-08 RX ADMIN — CARVEDILOL PHOSPHATE 12.5 MILLIGRAM(S): 80 CAPSULE, EXTENDED RELEASE ORAL at 22:50

## 2020-08-08 RX ADMIN — LEVETIRACETAM 250 MILLIGRAM(S): 250 TABLET, FILM COATED ORAL at 22:50

## 2020-08-08 RX ADMIN — LEVETIRACETAM 250 MILLIGRAM(S): 250 TABLET, FILM COATED ORAL at 10:27

## 2020-08-08 RX ADMIN — Medication 3 UNIT(S): at 17:11

## 2020-08-08 RX ADMIN — Medication 2 UNIT(S): at 12:15

## 2020-08-08 RX ADMIN — Medication 1: at 12:14

## 2020-08-08 RX ADMIN — AMLODIPINE BESYLATE 10 MILLIGRAM(S): 2.5 TABLET ORAL at 05:58

## 2020-08-08 RX ADMIN — HEPARIN SODIUM 1000 UNIT(S)/HR: 5000 INJECTION INTRAVENOUS; SUBCUTANEOUS at 16:01

## 2020-08-08 RX ADMIN — Medication 40 MILLIGRAM(S): at 05:50

## 2020-08-08 RX ADMIN — SENNA PLUS 1 TABLET(S): 8.6 TABLET ORAL at 22:50

## 2020-08-08 RX ADMIN — HEPARIN SODIUM 0 UNIT(S)/HR: 5000 INJECTION INTRAVENOUS; SUBCUTANEOUS at 07:36

## 2020-08-08 RX ADMIN — Medication 50 MICROGRAM(S): at 05:50

## 2020-08-08 RX ADMIN — ATORVASTATIN CALCIUM 80 MILLIGRAM(S): 80 TABLET, FILM COATED ORAL at 22:50

## 2020-08-08 RX ADMIN — CARVEDILOL PHOSPHATE 12.5 MILLIGRAM(S): 80 CAPSULE, EXTENDED RELEASE ORAL at 10:28

## 2020-08-08 RX ADMIN — SODIUM CHLORIDE 3 MILLILITER(S): 9 INJECTION INTRAMUSCULAR; INTRAVENOUS; SUBCUTANEOUS at 13:11

## 2020-08-08 RX ADMIN — SODIUM CHLORIDE 3 MILLILITER(S): 9 INJECTION INTRAMUSCULAR; INTRAVENOUS; SUBCUTANEOUS at 05:50

## 2020-08-08 RX ADMIN — HEPARIN SODIUM 900 UNIT(S)/HR: 5000 INJECTION INTRAVENOUS; SUBCUTANEOUS at 08:39

## 2020-08-08 NOTE — PROGRESS NOTE ADULT - SUBJECTIVE AND OBJECTIVE BOX
[facility]   MD NINA MASON MD RUORU WONG, PA    TEL:  OFFICE: [phone]  DR HSU CELL: [phone]  RAS MORELOS CELL: [phone]  DR. MARQUEZ CELL: [phone]  DR. ALEX CELL: [phone]    FROM 5 PM - 7 AM PLEASE CALL ANSWERING SERVICE: [phone]    RENAL FOLLOW UP NOTE  --------------------------------------------------------------------------------  HPI:      Pt seen and examined at bedside.   Denies SOB, chest pain     PAST HISTORY  --------------------------------------------------------------------------------  No significant changes to PMH, PSH, FHx, SHx, unless otherwise noted    ALLERGIES & MEDICATIONS  --------------------------------------------------------------------------------  Allergies    No Known Allergies    Intolerances      Standing Inpatient Medications  amLODIPine   Tablet 10 milliGRAM(s) Oral daily  atorvastatin 80 milliGRAM(s) Oral at bedtime  carvedilol 6.25 milliGRAM(s) Oral every 12 hours  dextrose 5%. 1000 milliLiter(s) IV Continuous <Continuous>  dextrose 50% Injectable 12.5 Gram(s) IV Push once  dextrose 50% Injectable 25 Gram(s) IV Push once  dextrose 50% Injectable 25 Gram(s) IV Push once  furosemide    Tablet 40 milliGRAM(s) Oral daily  heparin  Infusion.  Unit(s)/Hr IV Continuous <Continuous>  insulin glargine Injectable (LANTUS) 2 Unit(s) SubCutaneous at bedtime  insulin lispro (HumaLOG) corrective regimen sliding scale   SubCutaneous three times a day before meals  insulin lispro (HumaLOG) corrective regimen sliding scale   SubCutaneous at bedtime  insulin lispro Injectable (HumaLOG) 2 Unit(s) SubCutaneous three times a day before meals  levETIRAcetam 250 milliGRAM(s) Oral two times a day  levothyroxine 50 MICROGram(s) Oral daily  multivitamin 1 Tablet(s) Oral daily  senna 1 Tablet(s) Oral at bedtime  sodium chloride 0.9% lock flush 3 milliLiter(s) IV Push every 8 hours    PRN Inpatient Medications  dextrose 40% Gel 15 Gram(s) Oral once PRN  glucagon  Injectable 1 milliGRAM(s) IntraMuscular once PRN  heparin   Injectable 5000 Unit(s) IV Push every 6 hours PRN  heparin   Injectable 2500 Unit(s) IV Push every 6 hours PRN      REVIEW OF SYSTEMS  --------------------------------------------------------------------------------  General: no fever  CVS: no chest pain  RESP: no sob, no cough  ABD: no abdominal pain  : no dysuria,  MSK: no edema     VITALS/PHYSICAL EXAM  --------------------------------------------------------------------------------  T(C): 37.2 (08-08-20 @ 03:35), Max: 37.2 (08-08-20 @ 03:35)  HR: 70 (08-08-20 @ 03:35) (63 - 72)  BP: 157/84 (08-08-20 @ 03:35) (131/75 - 179/78)  RR: 18 (08-08-20 @ 03:35) (18 - 18)  SpO2: 100% (08-08-20 @ 03:35) (98% - 100%)  Wt(kg): --  Height (cm): 177.8 (08-07-20 @ 07:24)  Weight (kg): 62 (08-07-20 @ 07:24)  BMI (kg/m2): 19.6 (08-07-20 @ 07:24)  BSA (m2): 1.78 (08-07-20 @ 07:24)      08-07-20 @ 07:01  -  08-08-20 @ 07:00  --------------------------------------------------------  IN: 1302 mL / OUT: 3300 mL / NET: -1998 mL    08-08-20 @ 07:01  -  08-08-20 @ 09:49  --------------------------------------------------------  IN: 9 mL / OUT: 0 mL / NET: 9 mL      Physical Exam:  	Gen: NAD  	HEENT: MMM  	Pulm: CTA B/L  	CV: S1S2  	Abd: Soft, +BS  	Ext: No LE edema B/L                      Neuro: Awake alert   	Skin: Warm and Dry   	Vascular access: perma cath          FABIÁN tucker  LABS/STUDIES  --------------------------------------------------------------------------------              13.3   5.60  >-----------<  202      [08-08-20 @ 05:25]              42.1     133  |  94  |  32  ----------------------------<  165      [08-08-20 @ 05:26]  4.4   |  25  |  5.26        Ca     9.4     [08-08-20 @ 05:26]    TPro  7.3  /  Alb  3.6  /  TBili  0.3  /  DBili  x   /  AST  14  /  ALT  6   /  AlkPhos  84  [08-08-20 @ 05:26]    PT/INR: PT 13.8 , INR 1.17       [08-07-20 @ 17:37]  PTT: 197.0      [08-08-20 @ 06:41]      Creatinine Trend:  SCr 5.26 [08-08 @ 05:26]  SCr 6.48 [08-07 @ 07:11]        Iron 45, TIBC 145, %sat 31      [12-26-19 @ 23:24]  Ferritin 1369      [12-26-19 @ 23:24]  PTH -- (Ca 8.4)      [02-21-20 @ 08:54]   93  HbA1c 6.0      [02-21-20 @ 08:53]  TSH 1.68      [08-08-20 @ 00:47]  Lipid: chol 124, TG 59, HDL 64, LDL 47      [08-08-20 @ 00:47]

## 2020-08-08 NOTE — PROGRESS NOTE ADULT - ASSESSMENT
ESRD on HD ( MWF) via perma cath  PT from [facility]  s/p cath 8/7  s/p HD on 8/7 with 2 L UF   Plan for HD on Monday  Consent obtained from son at [phone]  MOnitor BMP    Anemia  Hb at goal for ESRD   Monitor at present    HTN  Bp improving   MOnitor BP  Low salt diet    CKD -BMD  Check PTH  Monitor serum calcium and PO4

## 2020-08-08 NOTE — PROGRESS NOTE ADULT - SUBJECTIVE AND OBJECTIVE BOX
Subjective " Hello I am feeling well"    VITAL SIGNS    Telemetry:  NSR 82    Vital Signs Last 24 Hrs  T(C): 36.8 (20 @ 10:00), Max: 37.2 (20 @ 03:35)  T(F): 98.2 (20 @ 10:00), Max: 98.9 (20 @ 03:35)  HR: 67 (20 @ 10:00) (63 - 72)  BP: 159/75 (20 @ 10:00) (131/75 - 179/78)  RR: 18 (20 @ 10:00) (18 - 18)  SpO2: 97% (20 @ 10:00) (97% - 100%)            @ 07:01  -   @ 07:00  --------------------------------------------------------  IN: 1302 mL / OUT: 3300 mL / NET: -1998 mL     @ 07:01  -   @ 12:41  --------------------------------------------------------  IN: 249 mL / OUT: 0 mL / NET: 249 mL   Daily Weight in k.8 (07 Aug 2020 23:00)      Bilirubin Total, Serum: 0.3 mg/dL ( @ 05:26)    CAPILLARY BLOOD GLUCOSE      POCT Blood Glucose.: 190 mg/dL (08 Aug 2020 12:07)  POCT Blood Glucose.: 102 mg/dL (08 Aug 2020 08:00)  POCT Blood Glucose.: 140 mg/dL (07 Aug 2020 23:11)  MEDICATIONS  (STANDING):  amLODIPine   Tablet 10 milliGRAM(s) Oral daily  atorvastatin 80 milliGRAM(s) Oral at bedtime  carvedilol 12.5 milliGRAM(s) Oral every 12 hours  furosemide    Tablet 40 milliGRAM(s) Oral daily  heparin  Infusion.  Unit(s)/Hr (11 mL/Hr) IV Continuous <Continuous>  insulin glargine Injectable (LANTUS) 2 Unit(s) SubCutaneous at bedtime  insulin lispro (HumaLOG) corrective regimen sliding scale   SubCutaneous three times a day before meals  insulin lispro (HumaLOG) corrective regimen sliding scale   SubCutaneous at bedtime  insulin lispro Injectable (HumaLOG) 2 Unit(s) SubCutaneous three times a day before meals  levETIRAcetam 250 milliGRAM(s) Oral two times a day  levothyroxine 50 MICROGram(s) Oral daily  multivitamin 1 Tablet(s) Oral daily  senna 1 Tablet(s) Oral at bedtime  sodium chloride 0.9% lock flush 3 milliLiter(s) IV Push every 8 hours    MEDICATIONS  (PRN):  dextrose 40% Gel 15 Gram(s) Oral once PRN Blood Glucose LESS THAN 70 milliGRAM(s)/deciliter  glucagon  Injectable 1 milliGRAM(s) IntraMuscular once PRN Glucose LESS THAN 70 milligrams/deciliter  heparin   Injectable 5000 Unit(s) IV Push every 6 hours PRN For aPTT less than 40  heparin   Injectable 2500 Unit(s) IV Push every 6 hours PRN For aPTT between 40 - 57                          PHYSICAL EXAM        Neurology: alert and oriented x 3, nonfocal, no gross deficits    CV :S1 S2RRR  Lungs: B/l CTA on roomair    Abdomen: soft, nontender, nondistended, positive bowel sounds, last bowel movement     :  Voids             Extremities: equal strength throughout   B/le warm well perfused + DP no calf tenderness                                            Discussed with Cardiothoracic Team at AM rounds. Subjective " Hello I am feeling well"    VITAL SIGNS    Telemetry:  NSR 82    Vital Signs Last 24 Hrs  T(C): 36.8 (20 @ 10:00), Max: 37.2 (20 @ 03:35)  T(F): 98.2 (20 @ 10:00), Max: 98.9 (20 @ 03:35)  HR: 67 (20 @ 10:00) (63 - 72)  BP: 159/75 (20 @ 10:00) (131/75 - 179/78)  RR: 18 (20 @ 10:00) (18 - 18)  SpO2: 97% (20 @ 10:00) (97% - 100%)            @ 07:01  -   @ 07:00  --------------------------------------------------------  IN: 1302 mL / OUT: 3300 mL / NET: -1998 mL     @ 07:01  -   @ 12:41  --------------------------------------------------------  IN: 249 mL / OUT: 0 mL / NET: 249 mL   Daily Weight in k.8 (07 Aug 2020 23:00)      Bilirubin Total, Serum: 0.3 mg/dL ( @ 05:26)    CAPILLARY BLOOD GLUCOSE      POCT Blood Glucose.: 190 mg/dL (08 Aug 2020 12:07)  POCT Blood Glucose.: 102 mg/dL (08 Aug 2020 08:00)  POCT Blood Glucose.: 140 mg/dL (07 Aug 2020 23:11)  MEDICATIONS  (STANDING):  amLODIPine   Tablet 10 milliGRAM(s) Oral daily  atorvastatin 80 milliGRAM(s) Oral at bedtime  carvedilol 12.5 milliGRAM(s) Oral every 12 hours  furosemide    Tablet 40 milliGRAM(s) Oral daily  heparin  Infusion.  Unit(s)/Hr (11 mL/Hr) IV Continuous <Continuous>  insulin glargine Injectable (LANTUS) 2 Unit(s) SubCutaneous at bedtime  insulin lispro (HumaLOG) corrective regimen sliding scale   SubCutaneous three times a day before meals  insulin lispro (HumaLOG) corrective regimen sliding scale   SubCutaneous at bedtime  insulin lispro Injectable (HumaLOG) 2 Unit(s) SubCutaneous three times a day before meals  levETIRAcetam 250 milliGRAM(s) Oral two times a day  levothyroxine 50 MICROGram(s) Oral daily  multivitamin 1 Tablet(s) Oral daily  senna 1 Tablet(s) Oral at bedtime  sodium chloride 0.9% lock flush 3 milliLiter(s) IV Push every 8 hours    MEDICATIONS  (PRN):  dextrose 40% Gel 15 Gram(s) Oral once PRN Blood Glucose LESS THAN 70 milliGRAM(s)/deciliter  glucagon  Injectable 1 milliGRAM(s) IntraMuscular once PRN Glucose LESS THAN 70 milligrams/deciliter  heparin   Injectable 5000 Unit(s) IV Push every 6 hours PRN For aPTT less than 40  heparin   Injectable 2500 Unit(s) IV Push every 6 hours PRN For aPTT between 40 - 57                          PHYSICAL EXAM        Neurology: alert and oriented x 3,RUE weakness    CV :S1 S2RRR  Lungs: B/l CTA on roomair    Abdomen: soft, nontender, nondistended, positive bowel sounds, last bowel movement     :  Voids             Extremities: equal strength throughout   B/le warm well perfused + DP no calf tenderness                                            Discussed with Cardiothoracic Team at AM rounds. Subjective " Hello I am feeling well"    VITAL SIGNS    Telemetry:  NSR 82    Vital Signs Last 24 Hrs  T(C): 36.8 (20 @ 10:00), Max: 37.2 (20 @ 03:35)  T(F): 98.2 (20 @ 10:00), Max: 98.9 (20 @ 03:35)  HR: 67 (20 @ 10:00) (63 - 72)  BP: 159/75 (20 @ 10:00) (131/75 - 179/78)  RR: 18 (20 @ 10:00) (18 - 18)  SpO2: 97% (20 @ 10:00) (97% - 100%)            @ 07:01  -   @ 07:00  --------------------------------------------------------  IN: 1302 mL / OUT: 3300 mL / NET: -1998 mL     @ 07:01  -   @ 12:41  --------------------------------------------------------  IN: 249 mL / OUT: 0 mL / NET: 249 mL   Daily Weight in k.8 (07 Aug 2020 23:00)      Bilirubin Total, Serum: 0.3 mg/dL ( @ 05:26)    CAPILLARY BLOOD GLUCOSE      POCT Blood Glucose.: 190 mg/dL (08 Aug 2020 12:07)  POCT Blood Glucose.: 102 mg/dL (08 Aug 2020 08:00)  POCT Blood Glucose.: 140 mg/dL (07 Aug 2020 23:11)  MEDICATIONS  (STANDING):  amLODIPine   Tablet 10 milliGRAM(s) Oral daily  atorvastatin 80 milliGRAM(s) Oral at bedtime  carvedilol 12.5 milliGRAM(s) Oral every 12 hours  furosemide    Tablet 40 milliGRAM(s) Oral daily  heparin  Infusion.  Unit(s)/Hr (11 mL/Hr) IV Continuous <Continuous>  insulin glargine Injectable (LANTUS) 2 Unit(s) SubCutaneous at bedtime  insulin lispro (HumaLOG) corrective regimen sliding scale   SubCutaneous three times a day before meals  insulin lispro (HumaLOG) corrective regimen sliding scale   SubCutaneous at bedtime  insulin lispro Injectable (HumaLOG) 2 Unit(s) SubCutaneous three times a day before meals  levETIRAcetam 250 milliGRAM(s) Oral two times a day  levothyroxine 50 MICROGram(s) Oral daily  multivitamin 1 Tablet(s) Oral daily  senna 1 Tablet(s) Oral at bedtime  sodium chloride 0.9% lock flush 3 milliLiter(s) IV Push every 8 hours    MEDICATIONS  (PRN):  dextrose 40% Gel 15 Gram(s) Oral once PRN Blood Glucose LESS THAN 70 milliGRAM(s)/deciliter  glucagon  Injectable 1 milliGRAM(s) IntraMuscular once PRN Glucose LESS THAN 70 milligrams/deciliter  heparin   Injectable 5000 Unit(s) IV Push every 6 hours PRN For aPTT less than 40  heparin   Injectable 2500 Unit(s) IV Push every 6 hours PRN For aPTT between 40 - 57                          PHYSICAL EXAM        Neurology: alert and oriented x 3,RUE weakness    CV :S1 S2RRR  Lungs: B/l CTA on roomair    Abdomen: soft, nontender, nondistended, positive bowel sounds, last bowel movement     :  HD via avf           Extremities:   B/le warm well perfused + DP no calf tenderness                                            Discussed with Cardiothoracic Team at AM rounds. Subjective " Hello I am feeling well"    VITAL SIGNS    Telemetry:  NSR 82    Vital Signs Last 24 Hrs  T(C): 36.8 (20 @ 10:00), Max: 37.2 (20 @ 03:35)  T(F): 98.2 (20 @ 10:00), Max: 98.9 (20 @ 03:35)  HR: 67 (20 @ 10:00) (63 - 72)  BP: 159/75 (20 @ 10:00) (131/75 - 179/78)  RR: 18 (20 @ 10:00) (18 - 18)  SpO2: 97% (20 @ 10:00) (97% - 100%)            @ 07:01  -   @ 07:00  --------------------------------------------------------  IN: 1302 mL / OUT: 3300 mL / NET: -1998 mL     @ 07:01  -   @ 12:41  --------------------------------------------------------  IN: 249 mL / OUT: 0 mL / NET: 249 mL   Daily Weight in k.8 (07 Aug 2020 23:00)      Bilirubin Total, Serum: 0.3 mg/dL ( @ 05:26)    CAPILLARY BLOOD GLUCOSE      POCT Blood Glucose.: 190 mg/dL (08 Aug 2020 12:07)  POCT Blood Glucose.: 102 mg/dL (08 Aug 2020 08:00)  POCT Blood Glucose.: 140 mg/dL (07 Aug 2020 23:11)  MEDICATIONS  (STANDING):  amLODIPine   Tablet 10 milliGRAM(s) Oral daily  atorvastatin 80 milliGRAM(s) Oral at bedtime  carvedilol 12.5 milliGRAM(s) Oral every 12 hours  furosemide    Tablet 40 milliGRAM(s) Oral daily  heparin  Infusion.  Unit(s)/Hr (11 mL/Hr) IV Continuous <Continuous>  insulin glargine Injectable (LANTUS) 2 Unit(s) SubCutaneous at bedtime  insulin lispro (HumaLOG) corrective regimen sliding scale   SubCutaneous three times a day before meals  insulin lispro (HumaLOG) corrective regimen sliding scale   SubCutaneous at bedtime  insulin lispro Injectable (HumaLOG) 2 Unit(s) SubCutaneous three times a day before meals  levETIRAcetam 250 milliGRAM(s) Oral two times a day  levothyroxine 50 MICROGram(s) Oral daily  multivitamin 1 Tablet(s) Oral daily  senna 1 Tablet(s) Oral at bedtime  sodium chloride 0.9% lock flush 3 milliLiter(s) IV Push every 8 hours    MEDICATIONS  (PRN):  dextrose 40% Gel 15 Gram(s) Oral once PRN Blood Glucose LESS THAN 70 milliGRAM(s)/deciliter  glucagon  Injectable 1 milliGRAM(s) IntraMuscular once PRN Glucose LESS THAN 70 milligrams/deciliter  heparin   Injectable 5000 Unit(s) IV Push every 6 hours PRN For aPTT less than 40  heparin   Injectable 2500 Unit(s) IV Push every 6 hours PRN For aPTT between 40 - 57                          PHYSICAL EXAM        Neurology: alert and oriented x 3,RUE weakness    CV :S1 S2RRR  Lungs: B/l CTA on roomair    Abdomen: soft, nontender, nondistended, positive bowel sounds, last bowel movement     :  HD via pemcath         Extremities:   B/le warm well perfused + DP no calf tenderness                                            Discussed with Cardiothoracic Team at AM rounds.

## 2020-08-08 NOTE — PROGRESS NOTE ADULT - PROBLEM SELECTOR PLAN 1
TELE  ECHO  Heparing gtt  ASA Coreg 12.5 bid  Statin  bedside spirometry   CABG tuesday TELE  ECHO  Heparin gtt  Quantiferon gold   ID consult  ASA Coreg 12.5 bid  Statin  bedside spirometry   CABG tuesday

## 2020-08-08 NOTE — PROGRESS NOTE ADULT - ASSESSMENT
This is a  64y Male PMH HTN, LAD stent 2009, 2019, carotid stenosis, ESRD on HD, DM, CVA, dysphagia s/p PEG, atrial fibrillation, anemia and GI bleed. 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 > Now for CABG with DR Guillory .  8/8 VSS  ECHO completed this am not yet resulte dtheaputic on heparin gtt CP  pain free- Coreg increased 12.5 bid This is a  64y Male PMH HTN, LAD stent 2009, 2019, carotid stenosis, ESRD on HD, DM, CVA, dysphagia s/p PEG, atrial fibrillation, anemia and GI bleed. 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 > Now for CABG with DR Guillory .  8/8 VSS  ECHO completed this am not yet resulted therapeutic on heparin gtt CP  pain free- Coreg increased 12.5 bid As per  son patient had positive QuantiFeron Gold  at dialysis center  aysmptomatic - id consulted.

## 2020-08-09 LAB
ANION GAP SERPL CALC-SCNC: 19 MMOL/L — HIGH (ref 5–17)
APTT BLD: 114.6 SEC — HIGH (ref 27.5–35.5)
APTT BLD: 36.5 SEC — HIGH (ref 27.5–35.5)
APTT BLD: 38.3 SEC — HIGH (ref 27.5–35.5)
BUN SERPL-MCNC: 58 MG/DL — HIGH (ref 7–23)
CALCIUM SERPL-MCNC: 9.5 MG/DL — SIGNIFICANT CHANGE UP (ref 8.4–10.5)
CHLORIDE SERPL-SCNC: 93 MMOL/L — LOW (ref 96–108)
CO2 SERPL-SCNC: 24 MMOL/L — SIGNIFICANT CHANGE UP (ref 22–31)
CREAT SERPL-MCNC: 8.01 MG/DL — HIGH (ref 0.5–1.3)
GLUCOSE BLDC GLUCOMTR-MCNC: 132 MG/DL — HIGH (ref 70–99)
GLUCOSE BLDC GLUCOMTR-MCNC: 134 MG/DL — HIGH (ref 70–99)
GLUCOSE BLDC GLUCOMTR-MCNC: 172 MG/DL — HIGH (ref 70–99)
GLUCOSE BLDC GLUCOMTR-MCNC: 223 MG/DL — HIGH (ref 70–99)
GLUCOSE SERPL-MCNC: 129 MG/DL — HIGH (ref 70–99)
HCT VFR BLD CALC: 41.4 % — SIGNIFICANT CHANGE UP (ref 39–50)
HCT VFR BLD CALC: 42.6 % — SIGNIFICANT CHANGE UP (ref 39–50)
HCV AB S/CO SERPL IA: 0.24 S/CO — SIGNIFICANT CHANGE UP (ref 0–0.99)
HCV AB SERPL-IMP: SIGNIFICANT CHANGE UP
HGB BLD-MCNC: 13 G/DL — SIGNIFICANT CHANGE UP (ref 13–17)
HGB BLD-MCNC: 13.3 G/DL — SIGNIFICANT CHANGE UP (ref 13–17)
HIV 1+2 AB+HIV1 P24 AG SERPL QL IA: SIGNIFICANT CHANGE UP
MCHC RBC-ENTMCNC: 27.5 PG — SIGNIFICANT CHANGE UP (ref 27–34)
MCHC RBC-ENTMCNC: 28 PG — SIGNIFICANT CHANGE UP (ref 27–34)
MCHC RBC-ENTMCNC: 31.2 GM/DL — LOW (ref 32–36)
MCHC RBC-ENTMCNC: 31.4 GM/DL — LOW (ref 32–36)
MCV RBC AUTO: 88 FL — SIGNIFICANT CHANGE UP (ref 80–100)
MCV RBC AUTO: 89 FL — SIGNIFICANT CHANGE UP (ref 80–100)
NRBC # BLD: 0 /100 WBCS — SIGNIFICANT CHANGE UP (ref 0–0)
NRBC # BLD: 0 /100 WBCS — SIGNIFICANT CHANGE UP (ref 0–0)
PLATELET # BLD AUTO: 204 K/UL — SIGNIFICANT CHANGE UP (ref 150–400)
PLATELET # BLD AUTO: 210 K/UL — SIGNIFICANT CHANGE UP (ref 150–400)
POTASSIUM SERPL-MCNC: 4.8 MMOL/L — SIGNIFICANT CHANGE UP (ref 3.5–5.3)
POTASSIUM SERPL-SCNC: 4.8 MMOL/L — SIGNIFICANT CHANGE UP (ref 3.5–5.3)
RBC # BLD: 4.65 M/UL — SIGNIFICANT CHANGE UP (ref 4.2–5.8)
RBC # BLD: 4.84 M/UL — SIGNIFICANT CHANGE UP (ref 4.2–5.8)
RBC # FLD: 17.6 % — HIGH (ref 10.3–14.5)
RBC # FLD: 17.8 % — HIGH (ref 10.3–14.5)
SODIUM SERPL-SCNC: 136 MMOL/L — SIGNIFICANT CHANGE UP (ref 135–145)
WBC # BLD: 5.29 K/UL — SIGNIFICANT CHANGE UP (ref 3.8–10.5)
WBC # BLD: 6.41 K/UL — SIGNIFICANT CHANGE UP (ref 3.8–10.5)
WBC # FLD AUTO: 5.29 K/UL — SIGNIFICANT CHANGE UP (ref 3.8–10.5)
WBC # FLD AUTO: 6.41 K/UL — SIGNIFICANT CHANGE UP (ref 3.8–10.5)

## 2020-08-09 PROCEDURE — 99223 1ST HOSP IP/OBS HIGH 75: CPT

## 2020-08-09 RX ADMIN — AMLODIPINE BESYLATE 10 MILLIGRAM(S): 2.5 TABLET ORAL at 05:43

## 2020-08-09 RX ADMIN — HEPARIN SODIUM 0 UNIT(S)/HR: 5000 INJECTION INTRAVENOUS; SUBCUTANEOUS at 00:07

## 2020-08-09 RX ADMIN — SODIUM CHLORIDE 3 MILLILITER(S): 9 INJECTION INTRAMUSCULAR; INTRAVENOUS; SUBCUTANEOUS at 21:38

## 2020-08-09 RX ADMIN — ATORVASTATIN CALCIUM 80 MILLIGRAM(S): 80 TABLET, FILM COATED ORAL at 22:08

## 2020-08-09 RX ADMIN — Medication 1 TABLET(S): at 11:03

## 2020-08-09 RX ADMIN — HEPARIN SODIUM 1000 UNIT(S)/HR: 5000 INJECTION INTRAVENOUS; SUBCUTANEOUS at 18:03

## 2020-08-09 RX ADMIN — Medication 3 UNIT(S): at 11:39

## 2020-08-09 RX ADMIN — CARVEDILOL PHOSPHATE 12.5 MILLIGRAM(S): 80 CAPSULE, EXTENDED RELEASE ORAL at 17:10

## 2020-08-09 RX ADMIN — SODIUM CHLORIDE 3 MILLILITER(S): 9 INJECTION INTRAMUSCULAR; INTRAVENOUS; SUBCUTANEOUS at 13:14

## 2020-08-09 RX ADMIN — LEVETIRACETAM 250 MILLIGRAM(S): 250 TABLET, FILM COATED ORAL at 07:12

## 2020-08-09 RX ADMIN — INSULIN GLARGINE 4 UNIT(S): 100 INJECTION, SOLUTION SUBCUTANEOUS at 08:04

## 2020-08-09 RX ADMIN — SENNA PLUS 1 TABLET(S): 8.6 TABLET ORAL at 22:08

## 2020-08-09 RX ADMIN — Medication 3 UNIT(S): at 17:09

## 2020-08-09 RX ADMIN — SODIUM CHLORIDE 3 MILLILITER(S): 9 INJECTION INTRAMUSCULAR; INTRAVENOUS; SUBCUTANEOUS at 05:43

## 2020-08-09 RX ADMIN — HEPARIN SODIUM 5000 UNIT(S): 5000 INJECTION INTRAVENOUS; SUBCUTANEOUS at 11:03

## 2020-08-09 RX ADMIN — HEPARIN SODIUM 1100 UNIT(S)/HR: 5000 INJECTION INTRAVENOUS; SUBCUTANEOUS at 11:02

## 2020-08-09 RX ADMIN — Medication 3 UNIT(S): at 08:04

## 2020-08-09 RX ADMIN — HEPARIN SODIUM 800 UNIT(S)/HR: 5000 INJECTION INTRAVENOUS; SUBCUTANEOUS at 01:07

## 2020-08-09 RX ADMIN — Medication 50 MICROGRAM(S): at 05:43

## 2020-08-09 RX ADMIN — CARVEDILOL PHOSPHATE 12.5 MILLIGRAM(S): 80 CAPSULE, EXTENDED RELEASE ORAL at 10:18

## 2020-08-09 RX ADMIN — Medication 40 MILLIGRAM(S): at 05:43

## 2020-08-09 RX ADMIN — LEVETIRACETAM 250 MILLIGRAM(S): 250 TABLET, FILM COATED ORAL at 17:10

## 2020-08-09 RX ADMIN — Medication 2: at 11:39

## 2020-08-09 NOTE — PROGRESS NOTE ADULT - ASSESSMENT
This is a  64y Male PMH HTN, LAD stent 2009, 2019, carotid stenosis, ESRD on HD, DM, CVA, dysphagia s/p PEG, atrial fibrillation, anemia and GI bleed. 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 > Now for CABG with DR Guillory .  8/8 VSS  ECHO completed this am not yet resulted therapeutic on heparin gtt CP  pain free- Coreg increased 12.5 bid As per  son patient had positive QuantiFeron Gold  at dialysis center  aysmptomatic - id consulted.  8/9 VSS; Echo results in note; EF=62%

## 2020-08-09 NOTE — CONSULT NOTE ADULT - SUBJECTIVE AND OBJECTIVE BOX
Patient is a 64y old  Male who presents with a chief complaint of CP (08 Aug 2020 12:41)    HPI:  64 M ESRD on HD MWF undergoing transplant work up, CAD s/p stents, IDDMII, hospital course in December 2019 surrounding CVA c/b status epilepticus requiring intubation and PEG placement presented for cardiac angiogram as part of pre-transplant work up. ID is being called due to report by patients son of positive QuantiFeron test at patients HD center.    In the hospital pt is afebrile without leukocytosis. Recent CXR showed biapical scarring, CT Chest performed 8/8 with official read pending. Pt has multiple negative Quantiferon tests seen in system, most recently 6/3/19.     prior hospital charts reviewed [  ]  primary team notes reviewed [  ]  other consultant notes reviewed [  ]  PAST MEDICAL & SURGICAL HISTORY:  Intubation of airway performed without difficulty: Dec 2019  CVA c/b status epilepticus requiring intubation and PEG in 12/2019-  PEG (percutaneous endoscopic gastrostomy) status: removed July 2020  Anemia  CVA (cerebral vascular accident): 12/13/19 with residual bilateral weakness  Hypothyroidism  CRI (Chronic Renal Insufficiency)  CAD (Coronary Artery Disease): with Stents in 06/2009 and 6/2019  Dyslipidemia  Diabetes  Hypertension  History of insertion of stent into coronary artery bypass graft: 2009 and 2019    Allergies  No Known Allergies    ANTIMICROBIALS (past 90 days)  MEDICATIONS  (STANDING):      ANTIMICROBIALS:      OTHER MEDS: MEDICATIONS  (STANDING):  amLODIPine   Tablet 10 daily  atorvastatin 80 at bedtime  carvedilol 12.5 every 12 hours  dextrose 40% Gel 15 once PRN  dextrose 50% Injectable 12.5 once  dextrose 50% Injectable 25 once  dextrose 50% Injectable 25 once  furosemide    Tablet 40 daily  glucagon  Injectable 1 once PRN  heparin   Injectable 5000 every 6 hours PRN  heparin   Injectable 2500 every 6 hours PRN  heparin  Infusion.  <Continuous>  insulin glargine Injectable (LANTUS) 4 every morning  insulin lispro (HumaLOG) corrective regimen sliding scale  three times a day before meals  insulin lispro (HumaLOG) corrective regimen sliding scale  at bedtime  insulin lispro Injectable (HumaLOG) 3 before breakfast  insulin lispro Injectable (HumaLOG) 3 before lunch  insulin lispro Injectable (HumaLOG) 3 before dinner  levETIRAcetam 250 two times a day  levothyroxine 50 daily  senna 1 at bedtime    SOCIAL HISTORY:   hx smoking  non-smoker    FAMILY HISTORY:    REVIEW OF SYSTEMS  [  ] ROS unobtainable because:    [  ] All other systems negative except as noted below:	    Constitutional:  [ ] fever [ ] chills  [ ] weight loss  [ ] weakness  Skin:  [ ] rash [ ] phlebitis	  Eyes: [ ] icterus [ ] pain  [ ] discharge	  ENMT: [ ] sore throat  [ ] thrush [ ] ulcers [ ] exudates  Respiratory: [ ] dyspnea [ ] hemoptysis [ ] cough [ ] sputum	  Cardiovascular:  [ ] chest pain [ ] palpitations [ ] edema	  Gastrointestinal:  [ ] nausea [ ] vomiting [ ] diarrhea [ ] constipation [ ] pain	  Genitourinary:  [ ] dysuria [ ] frequency [ ] hematuria [ ] discharge [ ] flank pain  [ ] incontinence  Musculoskeletal:  [ ] myalgias [ ] arthralgias [ ] arthritis  [ ] back pain  Neurological:  [ ] headache [ ] seizures  [ ] confusion/altered mental status  Psychiatric:  [ ] anxiety [ ] depression	  Hematology/Lymphatics:  [ ] lymphadenopathy  Endocrine:  [ ] adrenal [ ] thyroid  Allergic/Immunologic:	 [ ] transplant [ ] seasonal    Vital Signs Last 24 Hrs  T(F): 97.7 (08-09-20 @ 05:00), Max: 98.9 (08-08-20 @ 03:35)  Vital Signs Last 24 Hrs  HR: 62 (08-09-20 @ 05:00) (56 - 67)  BP: 158/73 (08-09-20 @ 05:00) (130/64 - 166/72)  RR: 18 (08-09-20 @ 05:00)  SpO2: 97% (08-09-20 @ 05:00) (97% - 100%)  Wt(kg): --    PHYSICAL EXAM:  Constitutional: non-toxic, no distress  HEAD/EYES: anicteric, no conjunctival injection  ENT:  supple, no thrush  Cardiovascular:   normal S1, S2, no murmur, no edema  Respiratory:  clear BS bilaterally, no wheezes, no rales  GI:  soft, non-tender, normal bowel sounds  :  no tucker, no CVA tenderness  Musculoskeletal:  no synovitis, normal ROM  Neurologic: awake and alert, normal strength, no focal findings  Skin:  no rash, no erythema, no phlebitis  Heme/Onc: no lymphadenopathy   Psychiatric:  awake, alert, appropriate mood                            13.3   5.29  )-----------( 204      ( 09 Aug 2020 07:20 )             42.6     08-09    136  |  93<L>  |  58<H>  ----------------------------<  129<H>  4.8   |  24  |  8.01<H>    Ca    9.5      09 Aug 2020 07:20    TPro  7.3  /  Alb  3.6  /  TBili  0.3  /  DBili  x   /  AST  14  /  ALT  6<L>  /  AlkPhos  84  08-08    MICROBIOLOGY:              RADIOLOGY:  imaging below personally reviewed Patient is a 64y old  Male who presents with a chief complaint of CP (08 Aug 2020 12:41)    HPI:  64 M ESRD on HD MWF undergoing transplant work up, CAD s/p stents, IDDMII, hospital course in December 2019 surrounding CVA c/b status epilepticus requiring intubation and PEG placement s/p removal presented for cardiac angiogram as part of pre-transplant work up. ID is being called due to report by patients son of positive QuantiFeron test at patients HD center.    In the hospital pt is afebrile without leukocytosis. Recent CXR showed biapical scarring, CT Chest performed 8/8 with official read pending. Pt has multiple negative Quantiferon tests seen in system, most recently 6/3/19.    Pt is from [facility] but moved [place] the [place]. No known hx of TB infection or treatment in the past. The last time he traveled back to [place] was 2013. The last [place] he traveled outside the [place] to [place] also in 2013. Pt lives with his son and wife, neither of whom has been dx with TB. Pt states that his nephew had TB 2-3 years ago, but pt had not seen his nephew since 2-3 years prior to that diagnosis. He previously worked as a , but has not worked since december 2019 when he had his CVA.    Pt currently denies fever, chills, cough, hemoptysis, wt loss. He states he sweat profusely at night 1-2 times in the past 1.5 months.     prior hospital charts reviewed [ x ]  primary team notes reviewed [ x ]  other consultant notes reviewed [  x]  PAST MEDICAL & SURGICAL HISTORY:  Intubation of airway performed without difficulty: Dec 2019  CVA c/b status epilepticus requiring intubation and PEG in 12/2019-  PEG (percutaneous endoscopic gastrostomy) status: removed July 2020  Anemia  CVA (cerebral vascular accident): 12/13/19 with residual bilateral weakness  Hypothyroidism  CRI (Chronic Renal Insufficiency)  CAD (Coronary Artery Disease): with Stents in 06/2009 and 6/2019  Dyslipidemia  Diabetes  Hypertension  History of insertion of stent into coronary artery bypass graft: 2009 and 2019    Allergies  No Known Allergies    ANTIMICROBIALS (past 90 days)  MEDICATIONS  (STANDING):      ANTIMICROBIALS:      OTHER MEDS: MEDICATIONS  (STANDING):  amLODIPine   Tablet 10 daily  atorvastatin 80 at bedtime  carvedilol 12.5 every 12 hours  dextrose 40% Gel 15 once PRN  dextrose 50% Injectable 12.5 once  dextrose 50% Injectable 25 once  dextrose 50% Injectable 25 once  furosemide    Tablet 40 daily  glucagon  Injectable 1 once PRN  heparin   Injectable 5000 every 6 hours PRN  heparin   Injectable 2500 every 6 hours PRN  heparin  Infusion.  <Continuous>  insulin glargine Injectable (LANTUS) 4 every morning  insulin lispro (HumaLOG) corrective regimen sliding scale  three times a day before meals  insulin lispro (HumaLOG) corrective regimen sliding scale  at bedtime  insulin lispro Injectable (HumaLOG) 3 before breakfast  insulin lispro Injectable (HumaLOG) 3 before lunch  insulin lispro Injectable (HumaLOG) 3 before dinner  levETIRAcetam 250 two times a day  levothyroxine 50 daily  senna 1 at bedtime    SOCIAL HISTORY:  From [place], worked as , denies smoking, alcohol use    FAMILY HISTORY: Nephew with TB    REVIEW OF SYSTEMS  [  ] ROS unobtainable because:    [x  ] All other systems negative except as noted below:	    Constitutional:  [ ] fever [ ] chills  [ ] weight loss  [ ] weakness  Skin:  [ ] rash [ ] phlebitis	  Eyes: [ ] icterus [ ] pain  [ ] discharge	  ENMT: [ ] sore throat  [ ] thrush [ ] ulcers [ ] exudates  Respiratory: [ ] dyspnea [ ] hemoptysis [ ] cough [ ] sputum	  Cardiovascular:  [ ] chest pain [ ] palpitations [ ] edema	  Gastrointestinal:  [ ] nausea [ ] vomiting [ ] diarrhea [ ] constipation [ ] pain	  Genitourinary:  [ ] dysuria [ ] frequency [ ] hematuria [ ] discharge [ ] flank pain  [ ] incontinence  Musculoskeletal:  [ ] myalgias [ ] arthralgias [ ] arthritis  [ ] back pain  Neurological:  [ ] headache [ ] seizures  [ ] confusion/altered mental status  Psychiatric:  [ ] anxiety [ ] depression	  Hematology/Lymphatics:  [ ] lymphadenopathy  Endocrine:  [ ] adrenal [ ] thyroid  Allergic/Immunologic:	 [ ] transplant [ ] seasonal    Vital Signs Last 24 Hrs  T(F): 97.7 (08-09-20 @ 05:00), Max: 98.9 (08-08-20 @ 03:35)  Vital Signs Last 24 Hrs  HR: 62 (08-09-20 @ 05:00) (56 - 67)  BP: 158/73 (08-09-20 @ 05:00) (130/64 - 166/72)  RR: 18 (08-09-20 @ 05:00)  SpO2: 97% (08-09-20 @ 05:00) (97% - 100%)  Wt(kg): --    PHYSICAL EXAM:  Constitutional: non-toxic, no distress  Vascular: shiley RIJ  HEAD/EYES: anicteric, no conjunctival injection  ENT:  supple, no thrush  Cardiovascular:   normal S1, S2, no murmur, no edema  Respiratory:  clear BS bilaterally, no wheezes, no rales  GI:  soft, non-tender, normal bowel sounds, previous PEG site well healed  :  no tucker, no CVA tenderness  Musculoskeletal:  no synovitis, normal ROM  Neurologic: awake and alert, normal strength, no focal findings  Skin:  no rash, no erythema, no phlebitis  Heme/Onc: no lymphadenopathy   Psychiatric:  awake, alert, appropriate mood                            13.3   5.29  )-----------( 204      ( 09 Aug 2020 07:20 )             42.6     08-09    136  |  93<L>  |  58<H>  ----------------------------<  129<H>  4.8   |  24  |  8.01<H>    Ca    9.5      09 Aug 2020 07:20    TPro  7.3  /  Alb  3.6  /  TBili  0.3  /  DBili  x   /  AST  14  /  ALT  6<L>  /  AlkPhos  84  08-08    MICROBIOLOGY:    Quantiferon Plus TB (06.03.19 @ 15:25)    Quantiferon TB Plus: NEGATIVE:    Quant TB Plus Mitogen minus Nil: 6.12 IU/mL              RADIOLOGY:  < from: Xray Chest 2 Views PA/Lat (07.27.20 @ 11:01) >  COMPARISON: [facility] chest x-ray from January 8, 2020.    INTERPRETATION: The heart is not enlarged. There are coronary artery stents. The trachea is not significantly deviated from the midline. The mediastinum and amish appear unremarkable.  There is a right IJ Shiley catheter with tips in the SVC and at the SVC right atrial junction.  There is biapical scar. The lungs are otherwise clear.  No pleural effusion is seen.  No acute bony abnormality is noted.      IMPRESSION:  Biapical scar. The lungs are otherwise clear. Patient is a 64y old  Male who presents with a chief complaint of CP (08 Aug 2020 12:41)    HPI:  64 M ESRD on HD MWF undergoing transplant work up, CAD s/p stents, IDDMII, hospital course in December 2019 surrounding CVA c/b status epilepticus requiring intubation and PEG placement s/p removal presented for cardiac angiogram as part of pre-transplant work up. ID is being called due to report by patients son of positive QuantiFeron test at patients HD center.    In the hospital pt is afebrile without leukocytosis. Recent CXR showed biapical scarring, CT Chest performed 8/8 with official read pending. Pt has multiple negative Quantiferon tests seen in system, most recently 6/3/19.    Pt is from [facility] but moved [place] the [place] ago. No known hx of TB infection or treatment in the past. The last time he traveled back to [place] was 2013. The last time he traveled [place] the [place] to [place] also in 2013. Pt lives with his son and wife, neither of whom has been dx with TB. Pt states that his nephew had TB 2-3 years ago, but pt had not seen his nephew since 2-3 years prior to that diagnosis. He previously worked as a , but has not worked since december 2019 when he had his CVA.    Pt currently denies fever, chills, cough, hemoptysis, wt loss. He states he sweat profusely at night 1-2 times in the past 1.5 months.     prior hospital charts reviewed [ x ]  primary team notes reviewed [ x ]  other consultant notes reviewed [  x]  PAST MEDICAL & SURGICAL HISTORY:  Intubation of airway performed without difficulty: Dec 2019  CVA c/b status epilepticus requiring intubation and PEG in 12/2019-  PEG (percutaneous endoscopic gastrostomy) status: removed July 2020  Anemia  CVA (cerebral vascular accident): 12/13/19 with residual bilateral weakness  Hypothyroidism  CRI (Chronic Renal Insufficiency)  CAD (Coronary Artery Disease): with Stents in 06/2009 and 6/2019  Dyslipidemia  Diabetes  Hypertension  History of insertion of stent into coronary artery bypass graft: 2009 and 2019    Allergies  No Known Allergies    ANTIMICROBIALS (past 90 days)  MEDICATIONS  (STANDING):      ANTIMICROBIALS:      OTHER MEDS: MEDICATIONS  (STANDING):  amLODIPine   Tablet 10 daily  atorvastatin 80 at bedtime  carvedilol 12.5 every 12 hours  dextrose 40% Gel 15 once PRN  dextrose 50% Injectable 12.5 once  dextrose 50% Injectable 25 once  dextrose 50% Injectable 25 once  furosemide    Tablet 40 daily  glucagon  Injectable 1 once PRN  heparin   Injectable 5000 every 6 hours PRN  heparin   Injectable 2500 every 6 hours PRN  heparin  Infusion.  <Continuous>  insulin glargine Injectable (LANTUS) 4 every morning  insulin lispro (HumaLOG) corrective regimen sliding scale  three times a day before meals  insulin lispro (HumaLOG) corrective regimen sliding scale  at bedtime  insulin lispro Injectable (HumaLOG) 3 before breakfast  insulin lispro Injectable (HumaLOG) 3 before lunch  insulin lispro Injectable (HumaLOG) 3 before dinner  levETIRAcetam 250 two times a day  levothyroxine 50 daily  senna 1 at bedtime    SOCIAL HISTORY:  From [place], worked as , denies smoking, alcohol use    FAMILY HISTORY: Nephew with TB    REVIEW OF SYSTEMS  [  ] ROS unobtainable because:    [x  ] All other systems negative except as noted below:	    Constitutional:  [ ] fever [ ] chills  [ ] weight loss  [ ] weakness  Skin:  [ ] rash [ ] phlebitis	  Eyes: [ ] icterus [ ] pain  [ ] discharge	  ENMT: [ ] sore throat  [ ] thrush [ ] ulcers [ ] exudates  Respiratory: [ ] dyspnea [ ] hemoptysis [ ] cough [ ] sputum	  Cardiovascular:  [ ] chest pain [ ] palpitations [ ] edema	  Gastrointestinal:  [ ] nausea [ ] vomiting [ ] diarrhea [ ] constipation [ ] pain	  Genitourinary:  [ ] dysuria [ ] frequency [ ] hematuria [ ] discharge [ ] flank pain  [ ] incontinence  Musculoskeletal:  [ ] myalgias [ ] arthralgias [ ] arthritis  [ ] back pain  Neurological:  [ ] headache [ ] seizures  [ ] confusion/altered mental status  Psychiatric:  [ ] anxiety [ ] depression	  Hematology/Lymphatics:  [ ] lymphadenopathy  Endocrine:  [ ] adrenal [ ] thyroid  Allergic/Immunologic:	 [ ] transplant [ ] seasonal    Vital Signs Last 24 Hrs  T(F): 97.7 (08-09-20 @ 05:00), Max: 98.9 (08-08-20 @ 03:35)  Vital Signs Last 24 Hrs  HR: 62 (08-09-20 @ 05:00) (56 - 67)  BP: 158/73 (08-09-20 @ 05:00) (130/64 - 166/72)  RR: 18 (08-09-20 @ 05:00)  SpO2: 97% (08-09-20 @ 05:00) (97% - 100%)  Wt(kg): --    PHYSICAL EXAM:  Constitutional: non-toxic, no distress  Vascular: shiley RIFELTON  HEAD/EYES: anicteric, no conjunctival injection  ENT:  supple, no thrush  Cardiovascular:   normal S1, S2, no murmur, no edema  Respiratory:  clear BS bilaterally, no wheezes, no rales  GI:  soft, non-tender, normal bowel sounds, previous PEG site well healed  :  no tucker, no CVA tenderness  Musculoskeletal:  no synovitis, normal ROM  Neurologic: awake and alert, normal strength, no focal findings  Skin:  no rash, no erythema, no phlebitis  Heme/Onc: no lymphadenopathy   Psychiatric:  awake, alert, appropriate mood                            13.3   5.29  )-----------( 204      ( 09 Aug 2020 07:20 )             42.6     08-09    136  |  93<L>  |  58<H>  ----------------------------<  129<H>  4.8   |  24  |  8.01<H>    Ca    9.5      09 Aug 2020 07:20    TPro  7.3  /  Alb  3.6  /  TBili  0.3  /  DBili  x   /  AST  14  /  ALT  6<L>  /  AlkPhos  84  08-08    MICROBIOLOGY:    Quantiferon Plus TB (06.03.19 @ 15:25)    Quantiferon TB Plus: NEGATIVE:    Quant TB Plus Mitogen minus Nil: 6.12 IU/mL    TRANSPLANT CLEARANCE LABWORK (some labs seens in HIE)    Hep A IgG  reactive  Hepatitis B Surface Antigen: Nonreact (12-14-19 @ 23:48)  Hepatitis B Core IgM Antibody: Nonreact (12-14-19 @ 23:48)  Hepatitis B Surface AB Quantitative: >1000.0 mIU/mL (12-14-19 @ 23:48)  Hepatitis B Surface Antigen: Nonreact (12-14-19 @ 14:06)  Hep B core IgG postive   Hepatitis C Virus S/CO Ratio: 0.15 S/CO (12-14-19 @ 23:48)  Hepatitis C Virus Interpretation: Nonreact (12-14-19 @ 23:48)  CMV IgG +  EBV IgG+  HSV 1+ 2 negative                    RADIOLOGY:  < from: Xray Chest 2 Views PA/Lat (07.27.20 @ 11:01) >  COMPARISON: [facility] chest x-ray from January 8, 2020.    INTERPRETATION: The heart is not enlarged. There are coronary artery stents. The trachea is not significantly deviated from the midline. The mediastinum and amish appear unremarkable.  There is a right IJ Shiley catheter with tips in the SVC and at the SVC right atrial junction.  There is biapical scar. The lungs are otherwise clear.  No pleural effusion is seen.  No acute bony abnormality is noted.      IMPRESSION:  Biapical scar. The lungs are otherwise clear. Patient is a 64y old  Male who presents with a chief complaint of CP (08 Aug 2020 12:41)    HPI:  64 M ESRD on HD MWF undergoing transplant work up, CAD s/p stents, IDDMII, hospital course in December 2019 surrounding CVA c/b status epilepticus requiring intubation and PEG placement s/p removal presented for cardiac angiogram as part of pre-transplant work up. ID is being called due to report by patients son of positive QuantiFeron test at patients HD center.    In the hospital pt is afebrile without leukocytosis. Recent CXR showed biapical scarring, CT Chest performed 8/8 with official read pending. Pt has multiple negative Quantiferon tests seen in system, most recently 6/3/19.    Pt is from [facility] but moved [place] the [place]. No known hx of TB infection or treatment in the past. The last time he traveled back to [place] was 2013. The last time he traveled [place] the [place] to [place] also in [place]. Pt lives with his son and wife, neither of whom has been dx with TB. Pt states that his nephew had TB 2-3 years ago, but pt had not seen his nephew since 2-3 years prior to that diagnosis. He previously worked as a , but has not worked since december 2019 when he had his CVA.    Pt currently denies fever, chills, cough, hemoptysis, wt loss. He states he sweat profusely at night 1-2 times in the past 1.5 months.     prior hospital charts reviewed [ x ]  primary team notes reviewed [ x ]  other consultant notes reviewed [  x]  PAST MEDICAL & SURGICAL HISTORY:  Intubation of airway performed without difficulty: Dec 2019  CVA c/b status epilepticus requiring intubation and PEG in 12/2019-  PEG (percutaneous endoscopic gastrostomy) status: removed July 2020  Anemia  CVA (cerebral vascular accident): 12/13/19 with residual bilateral weakness  Hypothyroidism  CRI (Chronic Renal Insufficiency)  CAD (Coronary Artery Disease): with Stents in 06/2009 and 6/2019  Dyslipidemia  Diabetes  Hypertension  History of insertion of stent into coronary artery bypass graft: 2009 and 2019    Allergies  No Known Allergies    ANTIMICROBIALS (past 90 days)  MEDICATIONS  (STANDING):      ANTIMICROBIALS:      OTHER MEDS: MEDICATIONS  (STANDING):  amLODIPine   Tablet 10 daily  atorvastatin 80 at bedtime  carvedilol 12.5 every 12 hours  dextrose 40% Gel 15 once PRN  dextrose 50% Injectable 12.5 once  dextrose 50% Injectable 25 once  dextrose 50% Injectable 25 once  furosemide    Tablet 40 daily  glucagon  Injectable 1 once PRN  heparin   Injectable 5000 every 6 hours PRN  heparin   Injectable 2500 every 6 hours PRN  heparin  Infusion.  <Continuous>  insulin glargine Injectable (LANTUS) 4 every morning  insulin lispro (HumaLOG) corrective regimen sliding scale  three times a day before meals  insulin lispro (HumaLOG) corrective regimen sliding scale  at bedtime  insulin lispro Injectable (HumaLOG) 3 before breakfast  insulin lispro Injectable (HumaLOG) 3 before lunch  insulin lispro Injectable (HumaLOG) 3 before dinner  levETIRAcetam 250 two times a day  levothyroxine 50 daily  senna 1 at bedtime    SOCIAL HISTORY:  From [place], worked as , denies smoking, alcohol use    FAMILY HISTORY: Nephew with TB    REVIEW OF SYSTEMS  [  ] ROS unobtainable because:    [x  ] All other systems negative except as noted below:	    Constitutional:  [ ] fever [ ] chills  [ ] weight loss  [ ] weakness  Skin:  [ ] rash [ ] phlebitis	  Eyes: [ ] icterus [ ] pain  [ ] discharge	  ENMT: [ ] sore throat  [ ] thrush [ ] ulcers [ ] exudates  Respiratory: [ ] dyspnea [ ] hemoptysis [ ] cough [ ] sputum	  Cardiovascular:  [ ] chest pain [ ] palpitations [ ] edema	  Gastrointestinal:  [ ] nausea [ ] vomiting [ ] diarrhea [ ] constipation [ ] pain	  Genitourinary:  [ ] dysuria [ ] frequency [ ] hematuria [ ] discharge [ ] flank pain  [ ] incontinence  Musculoskeletal:  [ ] myalgias [ ] arthralgias [ ] arthritis  [ ] back pain  Neurological:  [ ] headache [ ] seizures  [ ] confusion/altered mental status  Psychiatric:  [ ] anxiety [ ] depression	  Hematology/Lymphatics:  [ ] lymphadenopathy  Endocrine:  [ ] adrenal [ ] thyroid  Allergic/Immunologic:	 [ ] transplant [ ] seasonal    Vital Signs Last 24 Hrs  T(F): 97.7 (08-09-20 @ 05:00), Max: 98.9 (08-08-20 @ 03:35)  Vital Signs Last 24 Hrs  HR: 62 (08-09-20 @ 05:00) (56 - 67)  BP: 158/73 (08-09-20 @ 05:00) (130/64 - 166/72)  RR: 18 (08-09-20 @ 05:00)  SpO2: 97% (08-09-20 @ 05:00) (97% - 100%)  Wt(kg): --    PHYSICAL EXAM:  Constitutional: non-toxic, no distress  Vascular: shiley RIFELTON  HEAD/EYES: anicteric, no conjunctival injection  ENT:  supple, no thrush  Cardiovascular:   normal S1, S2, no murmur, no edema  Respiratory:  clear BS bilaterally, no wheezes, no rales  GI:  soft, non-tender, normal bowel sounds, previous PEG site well healed  :  no tucker, no CVA tenderness  Musculoskeletal:  no synovitis, normal ROM  Neurologic: awake and alert, normal strength, no focal findings  Skin:  no rash, no erythema, no phlebitis  Heme/Onc: no lymphadenopathy   Psychiatric:  awake, alert, appropriate mood                            13.3   5.29  )-----------( 204      ( 09 Aug 2020 07:20 )             42.6     08-09    136  |  93<L>  |  58<H>  ----------------------------<  129<H>  4.8   |  24  |  8.01<H>    Ca    9.5      09 Aug 2020 07:20    TPro  7.3  /  Alb  3.6  /  TBili  0.3  /  DBili  x   /  AST  14  /  ALT  6<L>  /  AlkPhos  84  08-08    MICROBIOLOGY:    Quantiferon Plus TB (06.03.19 @ 15:25)    Quantiferon TB Plus: NEGATIVE:    Quant TB Plus Mitogen minus Nil: 6.12 IU/mL    TRANSPLANT CLEARANCE LABWORK (some labs seens in HIE)    Hep A IgG  reactive  Hepatitis B Surface Antigen: Nonreact (12-14-19 @ 23:48)  Hepatitis B Core IgM Antibody: Nonreact (12-14-19 @ 23:48)  Hepatitis B Surface AB Quantitative: >1000.0 mIU/mL (12-14-19 @ 23:48)  Hepatitis B Surface Antigen: Nonreact (12-14-19 @ 14:06)  Hep B core IgG postive   Hepatitis C Virus S/CO Ratio: 0.15 S/CO (12-14-19 @ 23:48)  Hepatitis C Virus Interpretation: Nonreact (12-14-19 @ 23:48)  CMV IgG +  EBV IgG+  HSV 1+ 2 negative      RADIOLOGY:    <The imaging below has been reviewed and visualized by me independently. Findings as detailed in report below>    EXAM:  CT CHEST                        PROCEDURE DATE:  08/08/2020    LUNGS AND AIRWAYS: Patent central airways.  Mild biapical opacities are unchanged.  There are bilateral perifissural opacities the largest of which measure:  *  9 mm right lower lobe zackery fissural pulmonary opacity unchanged compared to 12/24/2019 (3, 259).  *  Unchanged 4 mm zackery fissural right upper lobe opacity (3, 212).  *  Unchanged 6 mm left lower lobe zackery fissural pulmonary opacity (3, 227).    Bilateral dependent atelectasis. No consolidation.    PLEURA: No pleural effusion. No pneumothorax.    MEDIASTINUM AND AUSTIN: Chest lymph nodes are unchanged. The visualized thyroid gland and esophagus are unremarkable.    VESSELS: Right internal jugular approach central venous catheter terminates in the SVC. Calcified coronary and aortic plaque.    HEART: Heart size is normal. No pericardial effusion.    CHEST WALL AND LOWER NECK: Gynecomastia.    VISUALIZED UPPER ABDOMEN: Bilateral atrophic kidneys. Partially imaged subcentimeter low-attenuation left renal lesion, too small to accurately characterize. Left parapelvic cyst.    BONES: Spinal degenerative changes. The endplates of the thoracic and lumbar spine are increased in density which can suggest underlying metabolic bone disease.    IMPRESSION:    1.  No pneumonia.  2.  No change in the bilateral perifissural opacities.

## 2020-08-09 NOTE — PROGRESS NOTE ADULT - SUBJECTIVE AND OBJECTIVE BOX
CTS NP    Patient is a 64y old  Male who presents with a chief complaint of CP (08 Aug 2020 12:41)      HPI:  63 yo M w/ PMH HTN, CAD s/p LAD stent 2009, 2019, carotid stenosis, ESRD on HD (MWF Dr Cardenas, [place] [facility]- on transplant list), DM (on IV insulin, controlled, managed by Jamie Corral), CVA c/b status epilepticus requiring intubation and PEG in 12/2019-went to rehab, peg was removed July 2020, dysphagia s/p PEG, atrial fibrillation (on eliquis last dose 8/5/2020), anemia and GI bleed (Feb 2020) who presents today for cardiac angiogram. Patient reports cardiac evaluation for renal transplant- pharm stress test completed on 7/31/2020, revealing abnormal study: there is medium sized, moderate to severe defects in mid to distal anterior, mid to distal anterolateral walls predominantly reversible consistent with infarction with sig.. zackery- infarct ischemia. LVEF 45%. 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 in 12/2019-  PEG (percutaneous endoscopic gastrostomy) status: removed July 2020  Anemia  CVA (cerebral vascular accident): 12/13/19 with residual bilateral weakness  Hypothyroidism  CRI (Chronic Renal Insufficiency)  CAD (Coronary Artery Disease): with Stents in 06/2009 and 6/2019  CAD (Coronary Artery Disease)  Dyslipidemia  Diabetes  Hypertension  History of insertion of stent into coronary artery bypass graft: 2009 and 2019  No significant past surgical history      MEDICATIONS  (STANDING):  amLODIPine   Tablet 10 milliGRAM(s) Oral daily  atorvastatin 80 milliGRAM(s) Oral at bedtime  carvedilol 12.5 milliGRAM(s) Oral every 12 hours  dextrose 5%. 1000 milliLiter(s) (50 mL/Hr) IV Continuous <Continuous>  dextrose 50% Injectable 12.5 Gram(s) IV Push once  dextrose 50% Injectable 25 Gram(s) IV Push once  dextrose 50% Injectable 25 Gram(s) IV Push once  furosemide    Tablet 40 milliGRAM(s) Oral daily  heparin  Infusion.  Unit(s)/Hr (11 mL/Hr) IV Continuous <Continuous>  insulin glargine Injectable (LANTUS) 4 Unit(s) SubCutaneous every morning  insulin lispro (HumaLOG) corrective regimen sliding scale   SubCutaneous three times a day before meals  insulin lispro (HumaLOG) corrective regimen sliding scale   SubCutaneous at bedtime  insulin lispro Injectable (HumaLOG) 3 Unit(s) SubCutaneous before breakfast  insulin lispro Injectable (HumaLOG) 3 Unit(s) SubCutaneous before lunch  insulin lispro Injectable (HumaLOG) 3 Unit(s) SubCutaneous before dinner  levETIRAcetam 250 milliGRAM(s) Oral two times a day  levothyroxine 50 MICROGram(s) Oral daily  multivitamin 1 Tablet(s) Oral daily  senna 1 Tablet(s) Oral at bedtime  sodium chloride 0.9% lock flush 3 milliLiter(s) IV Push every 8 hours    MEDICATIONS  (PRN):  dextrose 40% Gel 15 Gram(s) Oral once PRN Blood Glucose LESS THAN 70 milliGRAM(s)/deciliter  glucagon  Injectable 1 milliGRAM(s) IntraMuscular once PRN Glucose LESS THAN 70 milligrams/deciliter  heparin   Injectable 5000 Unit(s) IV Push every 6 hours PRN For aPTT less than 40  heparin   Injectable 2500 Unit(s) IV Push every 6 hours PRN For aPTT between 40 - 57      Allergies    No Known Allergies    Intolerances        REVIEW OF SYSTEMS:  Negative unless otherwise stated    Vital Signs Last 24 Hrs  T(C): 36.5 (09 Aug 2020 05:00), Max: 36.8 (08 Aug 2020 10:00)  T(F): 97.7 (09 Aug 2020 05:00), Max: 98.2 (08 Aug 2020 10:00)  HR: 62 (09 Aug 2020 05:00) (56 - 67)  BP: 158/73 (09 Aug 2020 05:00) (130/64 - 166/72)  BP(mean): --  RR: 18 (09 Aug 2020 05:00) (18 - 18)  SpO2: 97% (09 Aug 2020 05:00) (97% - 100%)    PHYSICAL EXAM: WDWN 65 y/o man, sitting in chair  Neuro: A&Ox3  Lungs: CTA bilaterally  COR: S1S2 present, no murmurs/rubs/gallops noted; Telemetry: NSR 60s  Chest: right chest HD catheter in place   Extremities: CASTILLO, no edema noted to LEs  Pain: Patient denies pain    LABS:                        13.3   5.29  )-----------( 204      ( 09 Aug 2020 07:20 )             42.6     08-09    136  |  93<L>  |  58<H>  ----------------------------<  129<H>  4.8   |  24  |  8.01<H>    Ca    9.5      09 Aug 2020 07:20    TPro  7.3  /  Alb  3.6  /  TBili  0.3  /  DBili  x   /  AST  14  /  ALT  6<L>  /  AlkPhos  84  08-08    PT/INR - ( 07 Aug 2020 17:37 )   PT: 13.8 sec;   INR: 1.17 ratio         PTT - ( 09 Aug 2020 07:20 )  PTT:38.3 sec    CAPILLARY BLOOD GLUCOSE      POCT Blood Glucose.: 134 mg/dL (09 Aug 2020 08:00)  POCT Blood Glucose.: 232 mg/dL (08 Aug 2020 22:44)  POCT Blood Glucose.: 110 mg/dL (08 Aug 2020 16:46)  POCT Blood Glucose.: 190 mg/dL (08 Aug 2020 12:07)      RADIOLOGY & ADDITIONAL TESTS: < from: TTE with Doppler (w/3D Echo) (08.08.20 @ 07:31) >  Patient name: CHAD DUNBAR  YOB: 1955   Age: 64 (M)   MR#: [id number]  Study Date: 8/8/2020  [id number]: Vijaya Valentin RDCS  Study quality: Technically good  Referring Physician: Julian Guillory MD  Blood Pressure: 157/84 mmHg  Height: 178 cm  Weight: 62 kg  BSA: 1.8 m2  ------------------------------------------------------------------------  PROCEDURE: Transthoracic echocardiogram with 2-D, M-Mode  and complete spectral and color flow Doppler. Real-time and  reconstructed 3-dimensional imaging was performed.  Color  Doppler analysis was carried out.  INDICATION: Encounter for other preprocedural examination  (Z01.818)  ------------------------------------------------------------------------  Dimensions:Normal Values:  LA:     3.4    2.0 - 4.0 cm  Ao:     3.3    2.0 - 3.8 cm  SEPTUM: 0.9    0.6 - 1.2 cm  PWT:    0.9    0.6 - 1.1 cm  LVIDd:  5.2    3.0 - 5.6 cm  LVIDs:  3.7    1.8 - 4.0 cm  Derived variables:  LVMI: 95 g/m2  RWT: 0.34  Fractional short: 29 %  EF (3D Quantification): 62 %Doppler Peak Velocity (m/sec):  AoV=1.1  ------------------------------------------------------------------------  Observations:Mitral Valve: Mitral annular calcification, otherwise  normal mitral valve. Minimalmitral regurgitation.  Aortic Valve/Aorta: Calcified trileaflet aortic valve with  normal opening. Peak transaortic valve gradient equals 5 mm  Hg. Peak left ventricular outflow tract gradient equals 4  mm Hg, mean gradient is equal to 2 mm Hg, LVOT velocity  time integral equals 22 cm.  Aortic Root: 3.3 cm.  Left Atrium: Normal left atrium.  LA volume index = 25  cc/m2.  Left Ventricle: Normal left ventricular systolic function.  No segmental wall motion abnormalities. Normal left  ventricular internal dimensions and wall thicknesses. Mild  diastolic dysfunction (Stage I).  Right Heart: Normal right atrium. Normal right ventricular  size and function. Normal tricuspid valve. Normal pulmonic  valve.  Pericardium/Pleura: Normal pericardium with no pericardial  effusion.  Hemodynamic: Estimated right atrial pressure is 8 mm Hg.  ------------------------------------------------------------------------  Conclusions:  1. Normal left atrium.  LA volume index = 25 cc/m2.  2. Normal left ventricular internal dimensions and wall  thicknesses.  3. Normal left ventricular systolic function. No segmentalall motion abnormalities.  4. Mild diastolic dysfunction (Stage I).  5. Normal right ventricular size and function.  ------------------------------------------------------------------------

## 2020-08-09 NOTE — PROVIDER CONTACT NOTE (CRITICAL VALUE NOTIFICATION) - RECOMMENDATIONS
Stop Heparin infusion for 60mins, then decrease rate to 8ml/hr as per Full anticoagulation nomogram.  Watch for any signs of bleeding.

## 2020-08-09 NOTE — PROGRESS NOTE ADULT - ASSESSMENT
ESRD on HD ( MWF) via perma cath  PT from [facility]  s/p cath 8/7  s/p HD on 8/7 with 2 L UF   Plan for HD on Monday  Planned for CABG on Tuesday  Consent obtained from son at [phone]  MOnitor BMP    Anemia  Hb at goal for ESRD   Monitor at present    HTN  Bp improving   MOnitor BP  Low salt diet    CKD -BMD  Check PTH  Monitor serum calcium and PO4

## 2020-08-09 NOTE — CONSULT NOTE ADULT - ASSESSMENT
64 M ESRD on HD MWF undergoing transplant work up, CAD s/p stents, IDDMII, hospital course in December 2019 surrounding CVA c/b status epilepticus requiring intubation and PEG placement s/p removal presented for cardiac angiogram as part of pre-transplant work up, reports of outpatient new Quantiferon positive.    Reported new positive IGRA test  -Pt with multiple negative Quantiferon tests in system, most recently 6/3/19.  -From endemic area however multiple previous negative tests and no hx of active TB disease or treatment. No recent travel to endemic area since 2013. Nephew with TB dx 2-3 yrs ago, pt has not seen for 5-6 years. Worked as , possible exposure  -No symptoms of active pulmonary TB. Denies fever, cough, hemoptysis, wt loss, consistent night sweats  -CXR with biapical scarring, f/u CT Chest  -f/u inpatient QuantiFeron test  -further decisions based on Quantiferon test and imaging 64 M ESRD on HD MWF undergoing transplant work up, CAD s/p stents, IDDMII, hospital course in December 2019 surrounding CVA c/b status epilepticus requiring intubation and PEG placement s/p removal presented for cardiac angiogram as part of pre-transplant work up, reports of outpatient new Quantiferon positive.    Reported new positive IGRA test  -Pt with multiple negative Quantiferon tests in system, most recently 6/3/19.  -From endemic area however multiple previous negative tests and no hx of active TB disease or treatment. No recent travel to endemic area since 2013. Nephew with TB dx 2-3 yrs ago, pt has not seen for 5-6 years. Worked as , possible exposure  -No symptoms of active pulmonary TB. Denies fever, cough, hemoptysis, wt loss, consistent night sweats  -CXR with biapical scarring, f/u CT Chest  -f/u inpatient QuantiFeron test  -repeat COVID PCR due to likely need for induced AFB sputums     Pre-Transplant Evaluation  --Recommend HIV Test  --Recommend Syphilis Screen  --Recommend Toxoplasma IgG  --Recommend Strongyloides Ab  --Recommend Hep C screen  --Recommend measles, mumps 64 M ESRD on HD MWF undergoing transplant work up, CAD s/p stents, IDDMII, hospital course in December 2019 surrounding CVA c/b status epilepticus requiring intubation and PEG placement s/p removal presented for cardiac angiogram as part of pre-transplant work up, reports of outpatient new Quantiferon positive.    Reported new positive IGRA test  -Pt with multiple negative Quantiferon tests in system, most recently 6/3/19.  -From endemic area however multiple previous negative tests and no hx of active TB disease or treatment. No recent travel to endemic area since 2013. Nephew with TB dx 2-3 yrs ago, pt has not seen for 5-6 years. Worked as , possible exposure  -No symptoms of active pulmonary TB. Denies fever, cough, hemoptysis, wt loss, consistent night sweats  -CT Chest with stable fissural opacities per radiology report  -However, given new positive quantiferon in a pre-renal transplant patient favor ruling out for TB.   -f/u inpatient QuantiFeron test    Pre-Transplant Evaluation  --Recommend HIV Test  --Recommend Syphilis Screen  --Recommend Toxoplasma IgG  --Recommend Strongyloides Ab  --Recommend Hep C screen  --Recommend measles, mumps

## 2020-08-09 NOTE — PROVIDER CONTACT NOTE (CRITICAL VALUE NOTIFICATION) - BACKGROUND
8/7/20 S/P cardiac Cath  with multiple vessel disease.  pmh A-Fib, Cardiac stent, DM, ESRD on HD MWF

## 2020-08-09 NOTE — PROGRESS NOTE ADULT - PROBLEM SELECTOR PLAN 1
TELE  ECHO  Heparin gtt  Quantiferon gold   ID consult  ASA Coreg 12.5 bid  Statin  bedside spirometry   CABG tuesday

## 2020-08-09 NOTE — PROGRESS NOTE ADULT - SUBJECTIVE AND OBJECTIVE BOX
[facility]   MD NINA MASON MD RUORU WONG, PA    TEL:  OFFICE: [phone]  DR HSU CELL: [phone]  RAS MORELOS CELL: [phone]  DR. MARQUEZ CELL: [phone]  DR. ALEX CELL: [phone]    FROM 5 PM - 7 AM PLEASE CALL ANSWERING SERVICE: [phone]    RENAL FOLLOW UP NOTE  --------------------------------------------------------------------------------  HPI:      Pt seen and examined at bedside.   Denies SOB, chest pain     PAST HISTORY  --------------------------------------------------------------------------------  No significant changes to PMH, PSH, FHx, SHx, unless otherwise noted    ALLERGIES & MEDICATIONS  --------------------------------------------------------------------------------  Allergies    No Known Allergies    Intolerances      Standing Inpatient Medications  amLODIPine   Tablet 10 milliGRAM(s) Oral daily  atorvastatin 80 milliGRAM(s) Oral at bedtime  carvedilol 12.5 milliGRAM(s) Oral every 12 hours  dextrose 5%. 1000 milliLiter(s) IV Continuous <Continuous>  dextrose 50% Injectable 12.5 Gram(s) IV Push once  dextrose 50% Injectable 25 Gram(s) IV Push once  dextrose 50% Injectable 25 Gram(s) IV Push once  furosemide    Tablet 40 milliGRAM(s) Oral daily  heparin  Infusion.  Unit(s)/Hr IV Continuous <Continuous>  insulin glargine Injectable (LANTUS) 4 Unit(s) SubCutaneous every morning  insulin lispro (HumaLOG) corrective regimen sliding scale   SubCutaneous three times a day before meals  insulin lispro (HumaLOG) corrective regimen sliding scale   SubCutaneous at bedtime  insulin lispro Injectable (HumaLOG) 3 Unit(s) SubCutaneous before breakfast  insulin lispro Injectable (HumaLOG) 3 Unit(s) SubCutaneous before lunch  insulin lispro Injectable (HumaLOG) 3 Unit(s) SubCutaneous before dinner  levETIRAcetam 250 milliGRAM(s) Oral two times a day  levothyroxine 50 MICROGram(s) Oral daily  multivitamin 1 Tablet(s) Oral daily  senna 1 Tablet(s) Oral at bedtime  sodium chloride 0.9% lock flush 3 milliLiter(s) IV Push every 8 hours    PRN Inpatient Medications  dextrose 40% Gel 15 Gram(s) Oral once PRN  glucagon  Injectable 1 milliGRAM(s) IntraMuscular once PRN  heparin   Injectable 5000 Unit(s) IV Push every 6 hours PRN  heparin   Injectable 2500 Unit(s) IV Push every 6 hours PRN      REVIEW OF SYSTEMS  --------------------------------------------------------------------------------  General: no fever  ABD: no abdominal pain  : no dysuria,  MSK: no edema     VITALS/PHYSICAL EXAM  --------------------------------------------------------------------------------  T(C): 36.5 (08-09-20 @ 05:00), Max: 36.8 (08-08-20 @ 10:00)  HR: 62 (08-09-20 @ 05:00) (56 - 67)  BP: 158/73 (08-09-20 @ 05:00) (130/64 - 166/72)  RR: 18 (08-09-20 @ 05:00) (18 - 18)  SpO2: 97% (08-09-20 @ 05:00) (97% - 100%)  Wt(kg): --        08-08-20 @ 07:01  -  08-09-20 @ 07:00  --------------------------------------------------------  IN: 875 mL / OUT: 400 mL / NET: 475 mL      Physical Exam:  	Gen: NAD  	HEENT: MMM  	Pulm: CTA B/L  	CV: S1S2  	Abd: Soft, +BS  	Ext: No LE edema B/L                      Neuro: Awake alert  	Skin: Warm and Dry   	Vascular access: permacath          FABIÁN tucker  LABS/STUDIES  --------------------------------------------------------------------------------              13.3   5.29  >-----------<  204      [08-09-20 @ 07:20]              42.6     136  |  93  |  58  ----------------------------<  129      [08-09-20 @ 07:20]  4.8   |  24  |  8.01        Ca     9.5     [08-09-20 @ 07:20]    TPro  7.3  /  Alb  3.6  /  TBili  0.3  /  DBili  x   /  AST  14  /  ALT  6   /  AlkPhos  84  [08-08-20 @ 05:26]    PT/INR: PT 13.8 , INR 1.17       [08-07-20 @ 17:37]  PTT: 38.3       [08-09-20 @ 07:20]      Creatinine Trend:  SCr 8.01 [08-09 @ 07:20]  SCr 5.26 [08-08 @ 05:26]  SCr 6.48 [08-07 @ 07:11]        Iron 45, TIBC 145, %sat 31      [12-26-19 @ 23:24]  Ferritin 1369      [12-26-19 @ 23:24]  PTH -- (Ca 8.4)      [02-21-20 @ 08:54]   93  HbA1c 6.0      [02-21-20 @ 08:53]  TSH 1.68      [08-08-20 @ 00:47]  Lipid: chol 124, TG 59, HDL 64, LDL 47      [08-08-20 @ 00:47]

## 2020-08-09 NOTE — CONSULT NOTE ADULT - ATTENDING COMMENTS
64 M ESRD on HD MWF undergoing transplant work up, CAD s/p stents, IDDMII, hospital course in December 2019 surrounding CVA c/b status epilepticus requiring intubation and PEG placement s/p removal presented for cardiac angiogram as part of pre-transplant work up, reports of outpatient new Quantiferon positive.    CT Chest per radiology report with stable opacities when compared to 1/2020  On my review of the CT it appears that the RUL fissural opacity appears more prominent  In any case in this patient planned for renal transplant so, I would rule out for MTB  Suspicion is relatively low for active disease given no drastic changes in radiology and symptoms    Will likely need hypertonic saline nebulized induction  Would obtain AFB sputum with smear Q8H to expedite the process  Will require airborne isolation  Would followup on inpatient quantiferon  Would obtain serologic workup as per fellow note for pretransplant  Appears to be UTD for pre-transplant vaccines aside for shingrix (which would have to be done as outpatient)    Overall, Positive Quantiferon, Abnormal Chest CT, Pre-Renal Transplant, Encounter to Vaccinate patient    I will continue to follow. Please feel free to contact me with any further questions.    Carlton Hoover M.D.  [facility] Division of Infectious Disease  8AM-5PM: Pager Number [phone]  After Hours (or if no response): Please contact the Infectious Diseases Office at [phone]     The above assessment and plan were discussed with CTS NP
Pt seen and examined.  For CABG  targets LAD, OM.  STS risk 1-2 %  Risks/benefits d/w pt and son.  Agree to proceed

## 2020-08-10 LAB
ANION GAP SERPL CALC-SCNC: 24 MMOL/L — HIGH (ref 5–17)
APTT BLD: 119.9 SEC — HIGH (ref 27.5–35.5)
APTT BLD: 42.3 SEC — HIGH (ref 27.5–35.5)
APTT BLD: 73.3 SEC — HIGH (ref 27.5–35.5)
APTT BLD: 80.4 SEC — HIGH (ref 27.5–35.5)
BLD GP AB SCN SERPL QL: NEGATIVE — SIGNIFICANT CHANGE UP
BUN SERPL-MCNC: 84 MG/DL — HIGH (ref 7–23)
CALCIUM SERPL-MCNC: 9.6 MG/DL — SIGNIFICANT CHANGE UP (ref 8.4–10.5)
CHLORIDE SERPL-SCNC: 96 MMOL/L — SIGNIFICANT CHANGE UP (ref 96–108)
CO2 SERPL-SCNC: 21 MMOL/L — LOW (ref 22–31)
CREAT SERPL-MCNC: 9.46 MG/DL — HIGH (ref 0.5–1.3)
GLUCOSE BLDC GLUCOMTR-MCNC: 120 MG/DL — HIGH (ref 70–99)
GLUCOSE BLDC GLUCOMTR-MCNC: 122 MG/DL — HIGH (ref 70–99)
GLUCOSE BLDC GLUCOMTR-MCNC: 142 MG/DL — HIGH (ref 70–99)
GLUCOSE BLDC GLUCOMTR-MCNC: 150 MG/DL — HIGH (ref 70–99)
GLUCOSE BLDC GLUCOMTR-MCNC: 218 MG/DL — HIGH (ref 70–99)
GLUCOSE BLDC GLUCOMTR-MCNC: 84 MG/DL — SIGNIFICANT CHANGE UP (ref 70–99)
GLUCOSE SERPL-MCNC: 181 MG/DL — HIGH (ref 70–99)
HCT VFR BLD CALC: 37.2 % — LOW (ref 39–50)
HGB BLD-MCNC: 11.9 G/DL — LOW (ref 13–17)
MCHC RBC-ENTMCNC: 28.1 PG — SIGNIFICANT CHANGE UP (ref 27–34)
MCHC RBC-ENTMCNC: 32 GM/DL — SIGNIFICANT CHANGE UP (ref 32–36)
MCV RBC AUTO: 87.9 FL — SIGNIFICANT CHANGE UP (ref 80–100)
NIGHT BLUE STAIN TISS: SIGNIFICANT CHANGE UP
NIGHT BLUE STAIN TISS: SIGNIFICANT CHANGE UP
NRBC # BLD: 0 /100 WBCS — SIGNIFICANT CHANGE UP (ref 0–0)
PLATELET # BLD AUTO: 198 K/UL — SIGNIFICANT CHANGE UP (ref 150–400)
POTASSIUM SERPL-MCNC: 5.4 MMOL/L — HIGH (ref 3.5–5.3)
POTASSIUM SERPL-SCNC: 5.4 MMOL/L — HIGH (ref 3.5–5.3)
RBC # BLD: 4.23 M/UL — SIGNIFICANT CHANGE UP (ref 4.2–5.8)
RBC # FLD: 17.5 % — HIGH (ref 10.3–14.5)
RH IG SCN BLD-IMP: POSITIVE — SIGNIFICANT CHANGE UP
SODIUM SERPL-SCNC: 141 MMOL/L — SIGNIFICANT CHANGE UP (ref 135–145)
SPECIMEN SOURCE: SIGNIFICANT CHANGE UP
SPECIMEN SOURCE: SIGNIFICANT CHANGE UP
T GONDII IGG SER QL: <3 IU/ML — SIGNIFICANT CHANGE UP
T GONDII IGG SER QL: NEGATIVE — SIGNIFICANT CHANGE UP
WBC # BLD: 6.55 K/UL — SIGNIFICANT CHANGE UP (ref 3.8–10.5)
WBC # FLD AUTO: 6.55 K/UL — SIGNIFICANT CHANGE UP (ref 3.8–10.5)

## 2020-08-10 PROCEDURE — 93880 EXTRACRANIAL BILAT STUDY: CPT | Mod: 26

## 2020-08-10 PROCEDURE — 99232 SBSQ HOSP IP/OBS MODERATE 35: CPT

## 2020-08-10 RX ADMIN — SENNA PLUS 1 TABLET(S): 8.6 TABLET ORAL at 21:18

## 2020-08-10 RX ADMIN — HEPARIN SODIUM 1100 UNIT(S)/HR: 5000 INJECTION INTRAVENOUS; SUBCUTANEOUS at 01:23

## 2020-08-10 RX ADMIN — SODIUM CHLORIDE 3 MILLILITER(S): 9 INJECTION INTRAMUSCULAR; INTRAVENOUS; SUBCUTANEOUS at 13:02

## 2020-08-10 RX ADMIN — ATORVASTATIN CALCIUM 80 MILLIGRAM(S): 80 TABLET, FILM COATED ORAL at 21:18

## 2020-08-10 RX ADMIN — Medication 3 UNIT(S): at 07:58

## 2020-08-10 RX ADMIN — HEPARIN SODIUM 1000 UNIT(S)/HR: 5000 INJECTION INTRAVENOUS; SUBCUTANEOUS at 22:19

## 2020-08-10 RX ADMIN — LEVETIRACETAM 250 MILLIGRAM(S): 250 TABLET, FILM COATED ORAL at 17:41

## 2020-08-10 RX ADMIN — HEPARIN SODIUM 1000 UNIT(S)/HR: 5000 INJECTION INTRAVENOUS; SUBCUTANEOUS at 08:47

## 2020-08-10 RX ADMIN — Medication 40 MILLIGRAM(S): at 05:39

## 2020-08-10 RX ADMIN — HEPARIN SODIUM 1000 UNIT(S)/HR: 5000 INJECTION INTRAVENOUS; SUBCUTANEOUS at 15:46

## 2020-08-10 RX ADMIN — HEPARIN SODIUM 2500 UNIT(S): 5000 INJECTION INTRAVENOUS; SUBCUTANEOUS at 01:25

## 2020-08-10 RX ADMIN — Medication 1 TABLET(S): at 11:04

## 2020-08-10 RX ADMIN — Medication 3 UNIT(S): at 12:10

## 2020-08-10 RX ADMIN — CARVEDILOL PHOSPHATE 12.5 MILLIGRAM(S): 80 CAPSULE, EXTENDED RELEASE ORAL at 20:39

## 2020-08-10 RX ADMIN — AMLODIPINE BESYLATE 10 MILLIGRAM(S): 2.5 TABLET ORAL at 05:39

## 2020-08-10 RX ADMIN — LEVETIRACETAM 125 MILLIGRAM(S): 250 TABLET, FILM COATED ORAL at 20:39

## 2020-08-10 RX ADMIN — SODIUM CHLORIDE 3 MILLILITER(S): 9 INJECTION INTRAMUSCULAR; INTRAVENOUS; SUBCUTANEOUS at 05:32

## 2020-08-10 RX ADMIN — SODIUM CHLORIDE 3 MILLILITER(S): 9 INJECTION INTRAMUSCULAR; INTRAVENOUS; SUBCUTANEOUS at 21:18

## 2020-08-10 RX ADMIN — CARVEDILOL PHOSPHATE 12.5 MILLIGRAM(S): 80 CAPSULE, EXTENDED RELEASE ORAL at 05:39

## 2020-08-10 RX ADMIN — Medication 3 UNIT(S): at 20:38

## 2020-08-10 RX ADMIN — LEVETIRACETAM 250 MILLIGRAM(S): 250 TABLET, FILM COATED ORAL at 05:39

## 2020-08-10 RX ADMIN — INSULIN GLARGINE 4 UNIT(S): 100 INJECTION, SOLUTION SUBCUTANEOUS at 07:57

## 2020-08-10 RX ADMIN — Medication 2: at 12:10

## 2020-08-10 RX ADMIN — Medication 50 MICROGRAM(S): at 05:39

## 2020-08-10 NOTE — PROGRESS NOTE ADULT - ASSESSMENT
64 M ESRD on HD MWF undergoing transplant work up, CAD s/p stents, IDDMII, hospital course in December 2019 surrounding CVA c/b status epilepticus requiring intubation and PEG placement s/p removal presented for cardiac angiogram as part of pre-transplant work up, reports of outpatient new Quantiferon positive.    CT Chest per radiology report with stable opacities when compared to 1/2020  On my review of the CT it appears that the RUL fissural opacity appears more prominent  In any case in this patient planned for renal transplant so, I would rule out for MTB  Suspicion is relatively low for active disease given no drastic changes in radiology and symptoms    RECOMMENDATIONS:    #Positive Quantiferon, Abnormal Chest CT  --Maintain airborne isolation  --Recommend obtaining one more AFB smear with culture 8 hours from last sent sample  --Followup on AFB sputum culture    #Pre-Renal Transplant  --Can proceed with CABG if 3x sputum smears are negative  --ID clearance for renal transplant pending negative AFB sputum cultures (given positive quantiferon, positive PPD and lung opacities)    #Encounter to Vaccinate patient  --Appears to be UTD for pre-transplant vaccines aside for shingrix (which would have to be done as outpatient)    I will continue to follow. Please feel free to contact me with any further questions.    Carlton Hoover M.D.  [facility] Division of Infectious Disease  8AM-5PM: Pager Number [phone]  After Hours (or if no response): Please contact the Infectious Diseases Office at [phone]     The above assessment and plan were discussed with CTS Yina MUNOZ 64 M ESRD on HD MWF undergoing transplant work up, CAD s/p stents, IDDMII, hospital course in December 2019 surrounding CVA c/b status epilepticus requiring intubation and PEG placement s/p removal presented for cardiac angiogram as part of pre-transplant work up, reports of outpatient new Quantiferon positive.    CT Chest per radiology report with stable opacities when compared to 1/2020  On my review of the CT it appears that the RUL fissural opacity appears more prominent  In any case in this patient planned for renal transplant so, I would rule out for MTB  Suspicion is relatively low for active disease given no drastic changes in radiology and symptoms    RECOMMENDATIONS:    #Positive Quantiferon, Abnormal Chest CT  --Maintain airborne isolation  --Recommend obtaining one more AFB smear with culture 8 hours from last sent sample  --Followup on AFB sputum culture    #Pre-Renal Transplant  --Can proceed with CABG if 3x sputum smears are negative  --ID clearance for renal transplant pending negative AFB sputum cultures (given positive quantiferon, positive PPD and lung opacities)  --Followup on Strongyloides Serum Ab     #Encounter to Vaccinate patient  --Appears to be UTD for pre-transplant vaccines aside for shingrix (which would have to be done as outpatient)    I will continue to follow. Please feel free to contact me with any further questions.    Carlton Hoover M.D.  [facility] Division of Infectious Disease  8AM-5PM: Pager Number [phone]  After Hours (or if no response): Please contact the Infectious Diseases Office at [phone]     The above assessment and plan were discussed with CTS TAMMY, Yina

## 2020-08-10 NOTE — PROGRESS NOTE ADULT - ASSESSMENT
ESRD on HD ( MWF) via perma cath  PT from [facility]  s/p cath 8/7  s/p HD on 8/7 with 2 L UF   Plan for HD on Monday  Planned for CABG on Tuesday?  Consent obtained from son at [phone]  MOnitor BMP    Anemia  Hb at goal for ESRD   Monitor at present    HTN  Bp improving   MOnitor BP  Low salt diet    CKD -BMD  Check PTH  Monitor serum calcium and PO4

## 2020-08-10 NOTE — PROGRESS NOTE ADULT - ASSESSMENT
This is a  64y Male PMH HTN, LAD stent 2009, 2019, carotid stenosis, ESRD on HD, DM, CVA, dysphagia s/p PEG, atrial fibrillation, anemia and GI bleed. 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 > Now for CABG with DR Guillory .  8/8 VSS  ECHO completed this am not yet resulted therapeutic on heparin gtt CP  pain free- Coreg increased 12.5 bid As per  son patient had positive QuantiFeron Gold  at dialysis center  aysmptomatic - id consulted.  8/9 VSS; Echo results in note; EF=62%   8/10 VSS CT reviewd QuantiFeron gold / AFIB received by lab no resulted  ID following - .  HD today. This is a  64y Male PMH HTN, LAD stent 2009, 2019, carotid stenosis, ESRD on HD, DM, CVA, dysphagia s/p PEG, atrial fibrillation, anemia and GI bleed. 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 > Now for CABG with DR Guillory .  8/8 VSS  ECHO completed this am not yet resulted therapeutic on heparin gtt CP  pain free- Coreg increased 12.5 bid As per  son patient had positive QuantiFeron Gold  at dialysis center  aysmptomatic - id consulted.  8/9 VSS; Echo results in note; EF=62%   8/10 VSS CT reviewd QuantiFeron gold / AFIB received by lab no resulted  ID following - .  HD today.  CABG for Thursday 8/14

## 2020-08-10 NOTE — PROGRESS NOTE ADULT - SUBJECTIVE AND OBJECTIVE BOX
Follow Up:      Interval History:    REVIEW OF SYSTEMS  [  ] ROS unobtainable because:    [  ] All other systems negative except as noted below    Constitutional:  [ ] fever [ ] chills  [ ] weight loss  [ ] weakness  Skin:  [ ] rash [ ] phlebitis	  Eyes: [ ] icterus [ ] pain  [ ] discharge	  ENMT: [ ] sore throat  [ ] thrush [ ] ulcers [ ] exudates  Respiratory: [ ] dyspnea [ ] hemoptysis [ ] cough [ ] sputum	  Cardiovascular:  [ ] chest pain [ ] palpitations [ ] edema	  Gastrointestinal:  [ ] nausea [ ] vomiting [ ] diarrhea [ ] constipation [ ] pain	  Genitourinary:  [ ] dysuria [ ] frequency [ ] hematuria [ ] discharge [ ] flank pain  [ ] incontinence  Musculoskeletal:  [ ] myalgias [ ] arthralgias [ ] arthritis  [ ] back pain  Neurological:  [ ] headache [ ] seizures  [ ] confusion/altered mental status    Allergies  No Known Allergies        ANTIMICROBIALS:      OTHER MEDS:  MEDICATIONS  (STANDING):  amLODIPine   Tablet 10 daily  atorvastatin 80 at bedtime  carvedilol 12.5 every 12 hours  dextrose 40% Gel 15 once PRN  dextrose 50% Injectable 12.5 once  dextrose 50% Injectable 25 once  dextrose 50% Injectable 25 once  furosemide    Tablet 40 daily  glucagon  Injectable 1 once PRN  heparin   Injectable 5000 every 6 hours PRN  heparin   Injectable 2500 every 6 hours PRN  heparin  Infusion.  <Continuous>  insulin glargine Injectable (LANTUS) 4 every morning  insulin lispro (HumaLOG) corrective regimen sliding scale  three times a day before meals  insulin lispro (HumaLOG) corrective regimen sliding scale  at bedtime  insulin lispro Injectable (HumaLOG) 3 before breakfast  insulin lispro Injectable (HumaLOG) 3 before lunch  insulin lispro Injectable (HumaLOG) 3 before dinner  levETIRAcetam 250 two times a day  levETIRAcetam 125 <User Schedule>  levothyroxine 50 daily  senna 1 at bedtime      Vital Signs Last 24 Hrs  T(C): 36.4 (10 Aug 2020 12:32), Max: 36.8 (10 Aug 2020 04:25)  T(F): 97.5 (10 Aug 2020 12:32), Max: 98.3 (10 Aug 2020 04:25)  HR: 61 (10 Aug 2020 12:32) (61 - 66)  BP: 149/64 (10 Aug 2020 12:32) (149/64 - 166/71)  BP(mean): --  RR: 18 (10 Aug 2020 12:32) (18 - 18)  SpO2: 100% (10 Aug 2020 12:32) (97% - 100%)    PHYSICAL EXAMINATION:  General: Alert and Awake, NAD  HEENT: PERRL, EOMI  Neck: Supple  Cardiac: RRR, No M/R/G  Resp: CTAB, No Wh/Rh/Ra  Abdomen: NBS, NT/ND, No HSM, No rigidity or guarding  MSK: No LE edema. No Calf tenderness  : No tucker  Skin: No rashes or lesions. Skin is warm and dry to the touch.   Neuro: Alert and Awake. CN 2-12 Grossly intact. Moves all four extremities spontaneously.  Psych: Calm, Pleasant, Cooperative                          11.9   6.55  )-----------( 198      ( 10 Aug 2020 00:33 )             37.2       08-10    141  |  96  |  84<H>  ----------------------------<  181<H>  5.4<H>   |  21<L>  |  9.46<H>    Ca    9.6      10 Aug 2020 00:33            MICROBIOLOGY:  v  .Sputum Other  08-10-20 --  --  --        Toxoplasma IgG Screen: <3.0 IU/mL (08-09-20 @ 19:37)          RADIOLOGY:    <The imaging below has been reviewed and visualized by me independently. Findings as detailed in report below> Follow Up:  Quantiferon Positive, pre-renal transplant     Interval History: afebrile. denies cough or SOB     REVIEW OF SYSTEMS  [  ] ROS unobtainable because:    [ x ] All other systems negative except as noted below    Constitutional:  [ ] fever [ ] chills  [ ] weight loss  [ ] weakness  Skin:  [ ] rash [ ] phlebitis	  Eyes: [ ] icterus [ ] pain  [ ] discharge	  ENMT: [ ] sore throat  [ ] thrush [ ] ulcers [ ] exudates  Respiratory: [ ] dyspnea [ ] hemoptysis [ ] cough [ ] sputum	  Cardiovascular:  [ ] chest pain [ ] palpitations [ ] edema	  Gastrointestinal:  [ ] nausea [ ] vomiting [ ] diarrhea [ ] constipation [ ] pain	  Genitourinary:  [ ] dysuria [ ] frequency [ ] hematuria [ ] discharge [ ] flank pain  [ ] incontinence  Musculoskeletal:  [ ] myalgias [ ] arthralgias [ ] arthritis  [ ] back pain  Neurological:  [ ] headache [ ] seizures  [ ] confusion/altered mental status    Allergies  No Known Allergies        ANTIMICROBIALS:      OTHER MEDS:  MEDICATIONS  (STANDING):  amLODIPine   Tablet 10 daily  atorvastatin 80 at bedtime  carvedilol 12.5 every 12 hours  dextrose 40% Gel 15 once PRN  dextrose 50% Injectable 12.5 once  dextrose 50% Injectable 25 once  dextrose 50% Injectable 25 once  furosemide    Tablet 40 daily  glucagon  Injectable 1 once PRN  heparin   Injectable 5000 every 6 hours PRN  heparin   Injectable 2500 every 6 hours PRN  heparin  Infusion.  <Continuous>  insulin glargine Injectable (LANTUS) 4 every morning  insulin lispro (HumaLOG) corrective regimen sliding scale  three times a day before meals  insulin lispro (HumaLOG) corrective regimen sliding scale  at bedtime  insulin lispro Injectable (HumaLOG) 3 before breakfast  insulin lispro Injectable (HumaLOG) 3 before lunch  insulin lispro Injectable (HumaLOG) 3 before dinner  levETIRAcetam 250 two times a day  levETIRAcetam 125 <User Schedule>  levothyroxine 50 daily  senna 1 at bedtime      Vital Signs Last 24 Hrs  T(C): 36.4 (10 Aug 2020 12:32), Max: 36.8 (10 Aug 2020 04:25)  T(F): 97.5 (10 Aug 2020 12:32), Max: 98.3 (10 Aug 2020 04:25)  HR: 61 (10 Aug 2020 12:32) (61 - 66)  BP: 149/64 (10 Aug 2020 12:32) (149/64 - 166/71)  BP(mean): --  RR: 18 (10 Aug 2020 12:32) (18 - 18)  SpO2: 100% (10 Aug 2020 12:32) (97% - 100%)    PHYSICAL EXAMINATION:  General: Alert and Awake, NAD  HEENT: PERRL, EOMI  Neck: Supple  Cardiac: RRR, No M/R/G  Resp: CTAB, No Wh/Rh/Ra  Abdomen: NBS, NT/ND, No HSM, No rigidity or guarding  MSK: No LE edema. No Calf tenderness  : No tucker  Skin: No rashes or lesions. Skin is warm and dry to the touch.   Neuro: Alert and Awake. CN 2-12 Grossly intact. Moves all four extremities spontaneously.  Psych: Calm, Pleasant, Cooperative                        11.9   6.55  )-----------( 198      ( 10 Aug 2020 00:33 )             37.2       08-10    141  |  96  |  84<H>  ----------------------------<  181<H>  5.4<H>   |  21<L>  |  9.46<H>    Ca    9.6      10 Aug 2020 00:33            MICROBIOLOGY:  v  .Sputum Other  08-10-20 --  --  --        Toxoplasma IgG Screen: <3.0 IU/mL (08-09-20 @ 19:37)          RADIOLOGY:    <The imaging below has been reviewed and visualized by me independently. Findings as detailed in report below>    EXAM:  CT CHEST                        PROCEDURE DATE:  08/08/2020    LUNGS AND AIRWAYS: Patent central airways.  Mild biapical opacities are unchanged.  There are bilateral perifissural opacities the largest of which measure:  *  9 mm right lower lobe zackery fissural pulmonary opacity unchanged compared to 12/24/2019 (3, 259).  *  Unchanged 4 mm zackery fissural right upper lobe opacity (3, 212).  *  Unchanged 6 mm left lower lobe zackery fissural pulmonary opacity (3, 227).    Bilateral dependent atelectasis. No consolidation.    PLEURA: No pleural effusion. No pneumothorax.    MEDIASTINUM AND AUSTIN: Chest lymph nodes are unchanged. The visualized thyroid gland and esophagus are unremarkable.    VESSELS: Right internal jugular approach central venous catheter terminates in the SVC. Calcified coronary and aortic plaque.    HEART: Heart size is normal. No pericardial effusion.    CHEST WALL AND LOWER NECK: Gynecomastia.    VISUALIZED UPPER ABDOMEN: Bilateral atrophic kidneys. Partially imaged subcentimeter low-attenuation left renal lesion, too small to accurately characterize. Left parapelvic cyst.    BONES: Spinal degenerative changes. The endplates of the thoracic and lumbar spine are increased in density which can suggest underlying metabolic bone disease.    IMPRESSION:    1.  No pneumonia.  2.  No change in the bilateral perifissural opacities.

## 2020-08-10 NOTE — PROGRESS NOTE ADULT - PROBLEM SELECTOR PLAN 1
TELE  ECHO ef 64  Heparin gtt therapeutic   Quantiferon gold  pending results  AFB sent   ID consult  ASA Coreg 12.5 bid  Statin  CABG   isolation precautions TELE  ECHO ef 64  Heparin gtt therapeutic   Quantiferon gold  pending results  AFB sent   ID consult  ASA Coreg 12.5 bid  Statin  CABG   isolation precautions  CABG 8/14 with Dr Guillory

## 2020-08-10 NOTE — PROGRESS NOTE ADULT - SUBJECTIVE AND OBJECTIVE BOX
[facility] [place], [place] NE[facility]ROLOGY PRACTICE   MD NINA MASON MD RUORU WONG, PA    TEL:  OFFICE: [phone]  DR HSU CELL: [phone]  RAS MORELOS CELL: [phone]  DR. MARQUEZ CELL: [phone]  DR. ALEX CELL: [phone]    FROM 5 PM - 7 AM PLEASE CALL ANSWERING SERVICE: [phone]    RENAL FOLLOW UP NOTE  --------------------------------------------------------------------------------  HPI:      Pt seen and examined at bedside.     PAST HISTORY  --------------------------------------------------------------------------------  No significant changes to PMH, PSH, FHx, SHx, unless otherwise noted    ALLERGIES & MEDICATIONS  --------------------------------------------------------------------------------  Allergies    No Known Allergies    Intolerances      Standing Inpatient Medications  amLODIPine   Tablet 10 milliGRAM(s) Oral daily  atorvastatin 80 milliGRAM(s) Oral at bedtime  carvedilol 12.5 milliGRAM(s) Oral every 12 hours  dextrose 5%. 1000 milliLiter(s) IV Continuous <Continuous>  dextrose 50% Injectable 12.5 Gram(s) IV Push once  dextrose 50% Injectable 25 Gram(s) IV Push once  dextrose 50% Injectable 25 Gram(s) IV Push once  furosemide    Tablet 40 milliGRAM(s) Oral daily  heparin  Infusion.  Unit(s)/Hr IV Continuous <Continuous>  insulin glargine Injectable (LANTUS) 4 Unit(s) SubCutaneous every morning  insulin lispro (HumaLOG) corrective regimen sliding scale   SubCutaneous three times a day before meals  insulin lispro (HumaLOG) corrective regimen sliding scale   SubCutaneous at bedtime  insulin lispro Injectable (HumaLOG) 3 Unit(s) SubCutaneous before breakfast  insulin lispro Injectable (HumaLOG) 3 Unit(s) SubCutaneous before lunch  insulin lispro Injectable (HumaLOG) 3 Unit(s) SubCutaneous before dinner  levETIRAcetam 250 milliGRAM(s) Oral two times a day  levETIRAcetam 125 milliGRAM(s) Oral <User Schedule>  levothyroxine 50 MICROGram(s) Oral daily  multivitamin 1 Tablet(s) Oral daily  senna 1 Tablet(s) Oral at bedtime  sodium chloride 0.9% lock flush 3 milliLiter(s) IV Push every 8 hours    PRN Inpatient Medications  dextrose 40% Gel 15 Gram(s) Oral once PRN  glucagon  Injectable 1 milliGRAM(s) IntraMuscular once PRN  heparin   Injectable 5000 Unit(s) IV Push every 6 hours PRN  heparin   Injectable 2500 Unit(s) IV Push every 6 hours PRN      REVIEW OF SYSTEMS  --------------------------------------------------------------------------------  General: no fever  ABD: no abdominal pain  : no dysuria,  MSK: no edema     VITALS/PHYSICAL EXAM  --------------------------------------------------------------------------------  T(C): 36.4 (08-10-20 @ 12:32), Max: 36.8 (08-10-20 @ 04:25)  HR: 61 (08-10-20 @ 12:32) (61 - 66)  BP: 149/64 (08-10-20 @ 12:32) (149/64 - 166/71)  RR: 18 (08-10-20 @ 12:32) (18 - 18)  SpO2: 100% (08-10-20 @ 12:32) (97% - 100%)  Wt(kg): --        08-09-20 @ 07:01  -  08-10-20 @ 07:00  --------------------------------------------------------  IN: 1085 mL / OUT: 450 mL / NET: 635 mL    08-10-20 @ 07:01  -  08-10-20 @ 14:33  --------------------------------------------------------  IN: 671 mL / OUT: 0 mL / NET: 671 mL      Physical Exam:  	Gen: NAD  	HEENT: MMM  	Pulm: CTA B/L  	CV: S1S2  	Abd: Soft, +BS  	Ext: No LE edema B/L                      Neuro: Awake alert  	Skin: Warm and Dry   	Vascular access: pc          FABIÁN tucker  LABS/STUDIES  --------------------------------------------------------------------------------              11.9   6.55  >-----------<  198      [08-10-20 @ 00:33]              37.2     141  |  96  |  84  ----------------------------<  181      [08-10-20 @ 00:33]  5.4   |  21  |  9.46        Ca     9.6     [08-10-20 @ 00:33]        PTT: 119.9      [08-10-20 @ 07:41]      Creatinine Trend:  SCr 9.46 [08-10 @ 00:33]  SCr 8.01 [08-09 @ 07:20]  SCr 5.26 [08-08 @ 05:26]  SCr 6.48 [08-07 @ 07:11]        Iron 45, TIBC 145, %sat 31      [12-26-19 @ 23:24]  Ferritin 1369      [12-26-19 @ 23:24]  PTH -- (Ca 8.4)      [02-21-20 @ 08:54]   93  HbA1c 6.0      [02-21-20 @ 08:53]  TSH 1.68      [08-08-20 @ 00:47]  Lipid: chol 124, TG 59, HDL 64, LDL 47      [08-08-20 @ 00:47]    HCV 0.24, Nonreact      [08-09-20 @ 19:37]  HIV Nonreact      [08-09-20 @ 19:26]

## 2020-08-10 NOTE — PROGRESS NOTE ADULT - SUBJECTIVE AND OBJECTIVE BOX
Subjective  ' hello  i am not having any discomfort    VITAL SIGNS    Telemetry:   NSR 62    Vital Signs Last 24 Hrs  T(C): 36.8 (08-10-20 @ 04:25), Max: 36.8 (08-10-20 @ 04:25)  T(F): 98.3 (08-10-20 @ 04:25), Max: 98.3 (08-10-20 @ 04:25)  HR: 66 (08-10-20 @ 04:25) (57 - 66)  BP: 166/71 (08-10-20 @ 04:25) (127/60 - 166/71)  RR: 18 (08-10-20 @ 04:25) (18 - 18)  SpO2: 98% (08-10-20 @ 04:25) (97% - 100%)            @ 07:01  -  08-10 @ 07:00  --------------------------------------------------------  IN: 1085 mL / OUT: 450 mL / NET: 635 mL    08-10 @ 07:01  -  08-10 @ 11:18  --------------------------------------------------------  IN: 360 mL / OUT: 0 mL / NET: 360 mL     Daily Weight in k.7 (10 Aug 2020 08:46)        CAPILLARY BLOOD GLUCOSE      POCT Blood Glucose.: 120 mg/dL (10 Aug 2020 07:42)  POCT Blood Glucose.: 172 mg/dL (09 Aug 2020 21:32)  POCT Blood Glucose.: 132 mg/dL (09 Aug 2020 16:55)  POCT Blood Glucose.: 223 mg/dL (09 Aug 2020 11:36)           MEDICATIONS  (STANDING):  amLODIPine   Tablet 10 milliGRAM(s) Oral daily  atorvastatin 80 milliGRAM(s) Oral at bedtime  carvedilol 12.5 milliGRAM(s) Oral every 12 hours  dextrose 5%. 1000 milliLiter(s) (50 mL/Hr) IV Continuous <Continuous>  dextrose 50% Injectable 12.5 Gram(s) IV Push once  dextrose 50% Injectable 25 Gram(s) IV Push once  dextrose 50% Injectable 25 Gram(s) IV Push once  furosemide    Tablet 40 milliGRAM(s) Oral daily  heparin  Infusion.  Unit(s)/Hr (11 mL/Hr) IV Continuous <Continuous>  insulin glargine Injectable (LANTUS) 4 Unit(s) SubCutaneous every morning  insulin lispro (HumaLOG) corrective regimen sliding scale   SubCutaneous three times a day before meals  insulin lispro (HumaLOG) corrective regimen sliding scale   SubCutaneous at bedtime  insulin lispro Injectable (HumaLOG) 3 Unit(s) SubCutaneous before breakfast  insulin lispro Injectable (HumaLOG) 3 Unit(s) SubCutaneous before lunch  insulin lispro Injectable (HumaLOG) 3 Unit(s) SubCutaneous before dinner  levETIRAcetam 250 milliGRAM(s) Oral two times a day  levETIRAcetam 125 milliGRAM(s) Oral <User Schedule>  levothyroxine 50 MICROGram(s) Oral daily  multivitamin 1 Tablet(s) Oral daily  senna 1 Tablet(s) Oral at bedtime  sodium chloride 0.9% lock flush 3 milliLiter(s) IV Push every 8 hours    MEDICATIONS  (PRN):  dextrose 40% Gel 15 Gram(s) Oral once PRN Blood Glucose LESS THAN 70 milliGRAM(s)/deciliter  glucagon  Injectable 1 milliGRAM(s) IntraMuscular once PRN Glucose LESS THAN 70 milligrams/deciliter  heparin   Injectable 5000 Unit(s) IV Push every 6 hours PRN For aPTT less than 40  heparin   Injectable 2500 Unit(s) IV Push every 6 hours PRN For aPTT between 40 - 57                  PHYSICAL EXAM  Neurology: alert and oriented x 3,RUE weakness    CV :S1 SRRR  Lungs: B/l CTA on room air  Abdomen: soft, nontender, nondistended, positive bowel sounds,   :    voids          Extremities:   RUE weakness  B/le le warm well perfused + DP                                            Discussed with Cardiothoracic Team at AM rounds. Subjective  ' hello  i am not having any discomfort    VITAL SIGNS    Telemetry:   NSR 62    Vital Signs Last 24 Hrs  T(C): 36.8 (08-10-20 @ 04:25), Max: 36.8 (08-10-20 @ 04:25)  T(F): 98.3 (08-10-20 @ 04:25), Max: 98.3 (08-10-20 @ 04:25)  HR: 66 (08-10-20 @ 04:25) (57 - 66)  BP: 166/71 (08-10-20 @ 04:25) (127/60 - 166/71)  RR: 18 (08-10-20 @ 04:25) (18 - 18)  SpO2: 98% (08-10-20 @ 04:25) (97% - 100%)            @ 07:01  -  08-10 @ 07:00  --------------------------------------------------------  IN: 1085 mL / OUT: 450 mL / NET: 635 mL    08-10 @ 07:01  -  08-10 @ 11:18  --------------------------------------------------------  IN: 360 mL / OUT: 0 mL / NET: 360 mL     Daily Weight in k.7 (10 Aug 2020 08:46)        CAPILLARY BLOOD GLUCOSE      POCT Blood Glucose.: 120 mg/dL (10 Aug 2020 07:42)  POCT Blood Glucose.: 172 mg/dL (09 Aug 2020 21:32)  POCT Blood Glucose.: 132 mg/dL (09 Aug 2020 16:55)  POCT Blood Glucose.: 223 mg/dL (09 Aug 2020 11:36)           MEDICATIONS  (STANDING):  amLODIPine   Tablet 10 milliGRAM(s) Oral daily  atorvastatin 80 milliGRAM(s) Oral at bedtime  carvedilol 12.5 milliGRAM(s) Oral every 12 hours  dextrose 5%. 1000 milliLiter(s) (50 mL/Hr) IV Continuous <Continuous>  dextrose 50% Injectable 12.5 Gram(s) IV Push once  dextrose 50% Injectable 25 Gram(s) IV Push once  dextrose 50% Injectable 25 Gram(s) IV Push once  furosemide    Tablet 40 milliGRAM(s) Oral daily  heparin  Infusion.  Unit(s)/Hr (11 mL/Hr) IV Continuous <Continuous>  insulin glargine Injectable (LANTUS) 4 Unit(s) SubCutaneous every morning  insulin lispro (HumaLOG) corrective regimen sliding scale   SubCutaneous three times a day before meals  insulin lispro (HumaLOG) corrective regimen sliding scale   SubCutaneous at bedtime  insulin lispro Injectable (HumaLOG) 3 Unit(s) SubCutaneous before breakfast  insulin lispro Injectable (HumaLOG) 3 Unit(s) SubCutaneous before lunch  insulin lispro Injectable (HumaLOG) 3 Unit(s) SubCutaneous before dinner  levETIRAcetam 250 milliGRAM(s) Oral two times a day  levETIRAcetam 125 milliGRAM(s) Oral <User Schedule>  levothyroxine 50 MICROGram(s) Oral daily  multivitamin 1 Tablet(s) Oral daily  senna 1 Tablet(s) Oral at bedtime  sodium chloride 0.9% lock flush 3 milliLiter(s) IV Push every 8 hours    MEDICATIONS  (PRN):  dextrose 40% Gel 15 Gram(s) Oral once PRN Blood Glucose LESS THAN 70 milliGRAM(s)/deciliter  glucagon  Injectable 1 milliGRAM(s) IntraMuscular once PRN Glucose LESS THAN 70 milligrams/deciliter  heparin   Injectable 5000 Unit(s) IV Push every 6 hours PRN For aPTT less than 40  heparin   Injectable 2500 Unit(s) IV Push every 6 hours PRN For aPTT between 40 - 57                  PHYSICAL EXAM  Neurology: alert and oriented x 3,RUE weakness    CV :S1 SRRR  Lungs: B/l CTA on room air  Abdomen: soft, nontender, nondistended, positive bowel sounds,   :    voids          Extremities:   RUE weakness  B/le le warm well perfused + DP    RTACW permcath                                        Discussed with Cardiothoracic Team at AM rounds.

## 2020-08-11 LAB
ANION GAP SERPL CALC-SCNC: 15 MMOL/L — SIGNIFICANT CHANGE UP (ref 5–17)
APTT BLD: 53.9 SEC — HIGH (ref 27.5–35.5)
APTT BLD: 88.2 SEC — HIGH (ref 27.5–35.5)
APTT BLD: >200 SEC — CRITICAL HIGH (ref 27.5–35.5)
BLD GP AB SCN SERPL QL: NEGATIVE — SIGNIFICANT CHANGE UP
BUN SERPL-MCNC: 44 MG/DL — HIGH (ref 7–23)
CALCIUM SERPL-MCNC: 9.3 MG/DL — SIGNIFICANT CHANGE UP (ref 8.4–10.5)
CHLORIDE SERPL-SCNC: 96 MMOL/L — SIGNIFICANT CHANGE UP (ref 96–108)
CO2 SERPL-SCNC: 24 MMOL/L — SIGNIFICANT CHANGE UP (ref 22–31)
CREAT SERPL-MCNC: 6.16 MG/DL — HIGH (ref 0.5–1.3)
GAMMA INTERFERON BACKGROUND BLD IA-ACNC: 0.02 IU/ML — SIGNIFICANT CHANGE UP
GLUCOSE BLDC GLUCOMTR-MCNC: 116 MG/DL — HIGH (ref 70–99)
GLUCOSE BLDC GLUCOMTR-MCNC: 193 MG/DL — HIGH (ref 70–99)
GLUCOSE BLDC GLUCOMTR-MCNC: 197 MG/DL — HIGH (ref 70–99)
GLUCOSE BLDC GLUCOMTR-MCNC: 221 MG/DL — HIGH (ref 70–99)
GLUCOSE SERPL-MCNC: 274 MG/DL — HIGH (ref 70–99)
HCT VFR BLD CALC: 38.9 % — LOW (ref 39–50)
HGB BLD-MCNC: 12.2 G/DL — LOW (ref 13–17)
INR BLD: 1.07 RATIO — SIGNIFICANT CHANGE UP (ref 0.88–1.16)
M TB IFN-G BLD-IMP: NEGATIVE — SIGNIFICANT CHANGE UP
M TB IFN-G CD4+ BCKGRND COR BLD-ACNC: 0 IU/ML — SIGNIFICANT CHANGE UP
M TB IFN-G CD4+CD8+ BCKGRND COR BLD-ACNC: 0 IU/ML — SIGNIFICANT CHANGE UP
MCHC RBC-ENTMCNC: 27.6 PG — SIGNIFICANT CHANGE UP (ref 27–34)
MCHC RBC-ENTMCNC: 31.4 GM/DL — LOW (ref 32–36)
MCV RBC AUTO: 88 FL — SIGNIFICANT CHANGE UP (ref 80–100)
NIGHT BLUE STAIN TISS: SIGNIFICANT CHANGE UP
NRBC # BLD: 0 /100 WBCS — SIGNIFICANT CHANGE UP (ref 0–0)
PLATELET # BLD AUTO: 207 K/UL — SIGNIFICANT CHANGE UP (ref 150–400)
POTASSIUM SERPL-MCNC: 4.7 MMOL/L — SIGNIFICANT CHANGE UP (ref 3.5–5.3)
POTASSIUM SERPL-SCNC: 4.7 MMOL/L — SIGNIFICANT CHANGE UP (ref 3.5–5.3)
PROTHROM AB SERPL-ACNC: 12.7 SEC — SIGNIFICANT CHANGE UP (ref 10.6–13.6)
QUANT TB PLUS MITOGEN MINUS NIL: 9.68 IU/ML — SIGNIFICANT CHANGE UP
RBC # BLD: 4.42 M/UL — SIGNIFICANT CHANGE UP (ref 4.2–5.8)
RBC # FLD: 17.8 % — HIGH (ref 10.3–14.5)
RH IG SCN BLD-IMP: POSITIVE — SIGNIFICANT CHANGE UP
SODIUM SERPL-SCNC: 135 MMOL/L — SIGNIFICANT CHANGE UP (ref 135–145)
SPECIMEN SOURCE: SIGNIFICANT CHANGE UP
WBC # BLD: 5.61 K/UL — SIGNIFICANT CHANGE UP (ref 3.8–10.5)
WBC # FLD AUTO: 5.61 K/UL — SIGNIFICANT CHANGE UP (ref 3.8–10.5)

## 2020-08-11 PROCEDURE — 99232 SBSQ HOSP IP/OBS MODERATE 35: CPT

## 2020-08-11 RX ORDER — HEPARIN SODIUM 5000 [USP'U]/ML
1000 INJECTION INTRAVENOUS; SUBCUTANEOUS
Qty: 25000 | Refills: 0 | Status: DISCONTINUED | OUTPATIENT
Start: 2020-08-11 | End: 2020-08-13

## 2020-08-11 RX ORDER — LISINOPRIL 2.5 MG/1
2.5 TABLET ORAL DAILY
Refills: 0 | Status: DISCONTINUED | OUTPATIENT
Start: 2020-08-11 | End: 2020-08-12

## 2020-08-11 RX ADMIN — SODIUM CHLORIDE 3 MILLILITER(S): 9 INJECTION INTRAMUSCULAR; INTRAVENOUS; SUBCUTANEOUS at 06:56

## 2020-08-11 RX ADMIN — ATORVASTATIN CALCIUM 80 MILLIGRAM(S): 80 TABLET, FILM COATED ORAL at 21:13

## 2020-08-11 RX ADMIN — CARVEDILOL PHOSPHATE 12.5 MILLIGRAM(S): 80 CAPSULE, EXTENDED RELEASE ORAL at 05:07

## 2020-08-11 RX ADMIN — Medication 40 MILLIGRAM(S): at 05:07

## 2020-08-11 RX ADMIN — HEPARIN SODIUM 10 UNIT(S)/HR: 5000 INJECTION INTRAVENOUS; SUBCUTANEOUS at 23:41

## 2020-08-11 RX ADMIN — LISINOPRIL 2.5 MILLIGRAM(S): 2.5 TABLET ORAL at 08:23

## 2020-08-11 RX ADMIN — SENNA PLUS 1 TABLET(S): 8.6 TABLET ORAL at 21:13

## 2020-08-11 RX ADMIN — Medication 3 UNIT(S): at 08:23

## 2020-08-11 RX ADMIN — AMLODIPINE BESYLATE 10 MILLIGRAM(S): 2.5 TABLET ORAL at 05:07

## 2020-08-11 RX ADMIN — Medication 3 UNIT(S): at 12:36

## 2020-08-11 RX ADMIN — SODIUM CHLORIDE 3 MILLILITER(S): 9 INJECTION INTRAMUSCULAR; INTRAVENOUS; SUBCUTANEOUS at 13:38

## 2020-08-11 RX ADMIN — Medication 3 UNIT(S): at 18:11

## 2020-08-11 RX ADMIN — Medication 1: at 18:11

## 2020-08-11 RX ADMIN — HEPARIN SODIUM 2500 UNIT(S): 5000 INJECTION INTRAVENOUS; SUBCUTANEOUS at 08:24

## 2020-08-11 RX ADMIN — LEVETIRACETAM 250 MILLIGRAM(S): 250 TABLET, FILM COATED ORAL at 05:07

## 2020-08-11 RX ADMIN — HEPARIN SODIUM 1100 UNIT(S)/HR: 5000 INJECTION INTRAVENOUS; SUBCUTANEOUS at 08:22

## 2020-08-11 RX ADMIN — LEVETIRACETAM 250 MILLIGRAM(S): 250 TABLET, FILM COATED ORAL at 18:10

## 2020-08-11 RX ADMIN — INSULIN GLARGINE 4 UNIT(S): 100 INJECTION, SOLUTION SUBCUTANEOUS at 08:24

## 2020-08-11 RX ADMIN — Medication 1 TABLET(S): at 18:10

## 2020-08-11 RX ADMIN — CARVEDILOL PHOSPHATE 12.5 MILLIGRAM(S): 80 CAPSULE, EXTENDED RELEASE ORAL at 18:10

## 2020-08-11 RX ADMIN — Medication 50 MICROGRAM(S): at 05:07

## 2020-08-11 RX ADMIN — HEPARIN SODIUM 1100 UNIT(S)/HR: 5000 INJECTION INTRAVENOUS; SUBCUTANEOUS at 16:25

## 2020-08-11 RX ADMIN — Medication 1: at 08:23

## 2020-08-11 RX ADMIN — SODIUM CHLORIDE 3 MILLILITER(S): 9 INJECTION INTRAMUSCULAR; INTRAVENOUS; SUBCUTANEOUS at 21:09

## 2020-08-11 NOTE — PROGRESS NOTE ADULT - ASSESSMENT
This is a  64y Male PMH HTN, LAD stent 2009, 2019, carotid stenosis, ESRD on HD, DM, CVA, dysphagia s/p PEG, atrial fibrillation, anemia and GI bleed. 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 > Now for CABG with DR Guillory .  8/8 VSS  ECHO completed this am not yet resulted therapeutic on heparin gtt CP  pain free- Coreg increased 12.5 bid As per  son patient had positive QuantiFeron Gold  at dialysis center  aysmptomatic - id consulted.  8/9 VSS; Echo results in note; EF=62%   8/10 VSS CT reviewed QuantiFeron gold / AFIB received by lab no resulted  ID following - .  HD today.  8/11 VSS AFB negative x2 third pending results IR consulte dto remove Permcath  Wednesday after HD.  CABG for Thursday 8/14

## 2020-08-11 NOTE — PROVIDER CONTACT NOTE (CRITICAL VALUE NOTIFICATION) - ASSESSMENT
Pt assessed reports no pain, discomfort, SOB, and no bleeding noted.
AOX4, No signs of bleeding noted.
Patient stable No S/S of bleeding. Heparin drip infusing at 11 cc/hr  on Full Anticoagulation Normogran.

## 2020-08-11 NOTE — PROGRESS NOTE ADULT - ASSESSMENT
ESRD on HD ( MWF) via perma cath  PT from [facility]  s/p cath 8/7  s/p HD on 8/10 with 2 L UF  Plan for HD on Wednesday  Planned for CABG   Consent obtained from son at [phone]  MOnitor BMP    Anemia  Hb at goal for ESRD   Monitor at present    HTN  Bp elevatd  Titrate lisinopril if BP remains elevated   MOnitor BP  Low salt diet    CKD -BMD  Check PTH  Monitor serum calcium and PO4

## 2020-08-11 NOTE — PROGRESS NOTE ADULT - SUBJECTIVE AND OBJECTIVE BOX
Subjective " hello I am not having any pain"  VITAL SIGNS    Telemetry: NSR 68     Vital Signs Last 24 Hrs  T(C): 36.7 (20 @ 05:05), Max: 36.7 (20 @ 05:05)  T(F): 98 (20 @ 05:05), Max: 98 (20 @ 05:05)  HR: 68 (20 @ 05:05) (57 - 68)  BP: 160/72 (20 @ 05:05) (149/64 - 160/72)  RR: 18 (20 @ 05:05) (18 - 18)  SpO2: 100% (20 @ 05:05) (97% - 100%)           08-10 @ 07:01  -   @ 07:00  --------------------------------------------------------  IN: 1378 mL / OUT: 2525 mL / NET: -1147 mL     @ 07:01  -   @ 12:14  --------------------------------------------------------  IN: 371 mL / OUT: 200 mL / NET: 171 mL       Daily Weight in k.2 (11 Aug 2020 08:06)  MEDICATIONS  (STANDING):  amLODIPine   Tablet 10 milliGRAM(s) Oral daily  atorvastatin 80 milliGRAM(s) Oral at bedtime  carvedilol 12.5 milliGRAM(s) Oral every 12 hours  furosemide    Tablet 40 milliGRAM(s) Oral daily  heparin  Infusion.  Unit(s)/Hr (11 mL/Hr) IV Continuous <Continuous>  insulin glargine Injectable (LANTUS) 4 Unit(s) SubCutaneous every morning  insulin lispro (HumaLOG) corrective regimen sliding scale   SubCutaneous three times a day before meals  insulin lispro (HumaLOG) corrective regimen sliding scale   SubCutaneous at bedtime  insulin lispro Injectable (HumaLOG) 3 Unit(s) SubCutaneous before breakfast  insulin lispro Injectable (HumaLOG) 3 Unit(s) SubCutaneous before lunch  insulin lispro Injectable (HumaLOG) 3 Unit(s) SubCutaneous before dinner  levETIRAcetam 250 milliGRAM(s) Oral two times a day  levETIRAcetam 125 milliGRAM(s) Oral <User Schedule>  levothyroxine 50 MICROGram(s) Oral daily  lisinopril 2.5 milliGRAM(s) Oral daily  multivitamin 1 Tablet(s) Oral daily  senna 1 Tablet(s) Oral at bedtime  sodium chloride 0.9% lock flush 3 milliLiter(s) IV Push every 8 hours    MEDICATIONS  (PRN):  dextrose 40% Gel 15 Gram(s) Oral once PRN Blood Glucose LESS THAN 70 milliGRAM(s)/deciliter  glucagon  Injectable 1 milliGRAM(s) IntraMuscular once PRN Glucose LESS THAN 70 milligrams/deciliter  heparin   Injectable 5000 Unit(s) IV Push every 6 hours PRN For aPTT less than 40  heparin   Injectable 2500 Unit(s) IV Push every 6 hours PRN For aPTT between 40 - 57        CAPILLARY BLOOD GLUCOSE      POCT Blood Glucose.: 116 mg/dL (11 Aug 2020 11:48)  POCT Blood Glucose.: 197 mg/dL (11 Aug 2020 08:01)  POCT Blood Glucose.: 150 mg/dL (10 Aug 2020 21:42)  POCT Blood Glucose.: 84 mg/dL (10 Aug 2020 20:36)                      PHYSICAL EXAM  Neurology: alert and oriented x 3,RUE weakness    CV : S1 S2 RRR   RT ACW PERM CATH  Lungs: B/l breath sounds on room air  Abdomen: soft, nontender, nondistended, positive bowel sounds,   :      HD mon/wed/fri          Extremities:   RUE weakness    B/lle warm well perfused + Dp no edema  no calf tender ness                                      Discussed with Cardiothoracic Team at AM rounds.

## 2020-08-11 NOTE — PROGRESS NOTE ADULT - SUBJECTIVE AND OBJECTIVE BOX
Follow Up:  Quantiferon Positive, pre-renal transplant     Interval History: afebrile. denies cough.     REVIEW OF SYSTEMS  [  ] ROS unobtainable because:    [ z ] All other systems negative except as noted below    Constitutional:  [ ] fever [ ] chills  [ ] weight loss  [ ] weakness  Skin:  [ ] rash [ ] phlebitis	  Eyes: [ ] icterus [ ] pain  [ ] discharge	  ENMT: [ ] sore throat  [ ] thrush [ ] ulcers [ ] exudates  Respiratory: [ ] dyspnea [ ] hemoptysis [ ] cough [ ] sputum	  Cardiovascular:  [ ] chest pain [ ] palpitations [ ] edema	  Gastrointestinal:  [ ] nausea [ ] vomiting [ ] diarrhea [ ] constipation [ ] pain	  Genitourinary:  [ ] dysuria [ ] frequency [ ] hematuria [ ] discharge [ ] flank pain  [ ] incontinence  Musculoskeletal:  [ ] myalgias [ ] arthralgias [ ] arthritis  [ ] back pain  Neurological:  [ ] headache [ ] seizures  [ ] confusion/altered mental status    Allergies  No Known Allergies        ANTIMICROBIALS:      OTHER MEDS:  MEDICATIONS  (STANDING):  amLODIPine   Tablet 10 daily  atorvastatin 80 at bedtime  carvedilol 12.5 every 12 hours  dextrose 40% Gel 15 once PRN  dextrose 50% Injectable 12.5 once  dextrose 50% Injectable 25 once  dextrose 50% Injectable 25 once  furosemide    Tablet 40 daily  glucagon  Injectable 1 once PRN  heparin   Injectable 5000 every 6 hours PRN  heparin   Injectable 2500 every 6 hours PRN  heparin  Infusion.  <Continuous>  insulin glargine Injectable (LANTUS) 4 every morning  insulin lispro (HumaLOG) corrective regimen sliding scale  three times a day before meals  insulin lispro (HumaLOG) corrective regimen sliding scale  at bedtime  insulin lispro Injectable (HumaLOG) 3 before breakfast  insulin lispro Injectable (HumaLOG) 3 before lunch  insulin lispro Injectable (HumaLOG) 3 before dinner  levETIRAcetam 250 two times a day  levETIRAcetam 125 <User Schedule>  levothyroxine 50 daily  lisinopril 2.5 daily  senna 1 at bedtime      Vital Signs Last 24 Hrs  T(C): 36.7 (11 Aug 2020 12:38), Max: 36.7 (11 Aug 2020 05:05)  T(F): 98 (11 Aug 2020 12:38), Max: 98 (11 Aug 2020 05:05)  HR: 60 (11 Aug 2020 12:38) (57 - 68)  BP: 149/72 (11 Aug 2020 12:38) (149/72 - 160/72)  BP(mean): --  RR: 16 (11 Aug 2020 12:38) (16 - 18)  SpO2: 98% (11 Aug 2020 12:38) (98% - 100%)    PHYSICAL EXAMINATION:  General: Alert and Awake, NAD  HEENT: PERRL, EOMI  Neck: Supple  Cardiac: RRR, No M/R/G  Resp: CTAB, No Wh/Rh/Ra  Abdomen: NBS, NT/ND, No HSM, No rigidity or guarding  MSK: No LE edema. No Calf tenderness  : No tucker  Skin: No rashes or lesions. Skin is warm and dry to the touch.   Neuro: Alert and Awake. CN 2-12 Grossly intact. Moves all four extremities spontaneously.  Psych: Calm, Pleasant, Cooperative                          12.2   5.61  )-----------( 207      ( 11 Aug 2020 06:48 )             38.9       08-11    135  |  96  |  44<H>  ----------------------------<  274<H>  4.7   |  24  |  6.16<H>    Ca    9.3      11 Aug 2020 06:48            MICROBIOLOGY:  v  .Sputum sputum conical  08-11-20 --  --  --      .Sputum SPUTUM  08-10-20 --  --  --      .Sputum Other  08-10-20 --  --  --        Toxoplasma IgG Screen: <3.0 IU/mL (08-09-20 @ 19:37)          RADIOLOGY:    <The imaging below has been reviewed and visualized by me independently. Findings as detailed in report below>    EXAM:  CT CHEST                        PROCEDURE DATE:  08/08/2020    LUNGS AND AIRWAYS: Patent central airways.  Mild biapical opacities are unchanged.  There are bilateral perifissural opacities the largest of which measure:  *  9 mm right lower lobe zackery fissural pulmonary opacity unchanged compared to 12/24/2019 (3, 259).  *  Unchanged 4 mm zackery fissural right upper lobe opacity (3, 212).  *  Unchanged 6 mm left lower lobe zackery fissural pulmonary opacity (3, 227).    Bilateral dependent atelectasis. No consolidation.    PLEURA: No pleural effusion. No pneumothorax.    MEDIASTINUM AND AUSTIN: Chest lymph nodes are unchanged. The visualized thyroid gland and esophagus are unremarkable.    VESSELS: Right internal jugular approach central venous catheter terminates in the SVC. Calcified coronary and aortic plaque.    HEART: Heart size is normal. No pericardial effusion.    CHEST WALL AND LOWER NECK: Gynecomastia.    VISUALIZED UPPER ABDOMEN: Bilateral atrophic kidneys. Partially imaged subcentimeter low-attenuation left renal lesion, too small to accurately characterize. Left parapelvic cyst.    BONES: Spinal degenerative changes. The endplates of the thoracic and lumbar spine are increased in density which can suggest underlying metabolic bone disease.    IMPRESSION:    1.  No pneumonia.  2.  No change in the bilateral perifissural opacities.

## 2020-08-11 NOTE — PROGRESS NOTE ADULT - PROBLEM SELECTOR PLAN 1
TELE  ECHO ef 64  Heparin gtt therapeutic   Quantiferon gold  pending results  AFB x2 negative   ID consult Dr Hoover -   IR consulted to remove PERMCATH Wednesday after HD  ASA Coreg 12.5 bid  Statin  isolation precautions  CABG 8/14 with Dr Guillory

## 2020-08-11 NOTE — PROVIDER CONTACT NOTE (CRITICAL VALUE NOTIFICATION) - ACTION/TREATMENT ORDERED:
Bleeding precaution maintained. Hold Heparin for 60 minutes, Heparin will be revised to patient specific, and will revise drip rate to 10cc/hr. Continue to monitor.
Heparin drip stopped for 1 hour and restarted at 0840 AM , decrease rate by 200 units,. Infusing at 9 cc/hr via pump. For repeat APTT in 6 hrs at 1400 PM.
See above recommendation.

## 2020-08-11 NOTE — CONSULT NOTE ADULT - ASSESSMENT
Assessment: 64y Male planned for CABG on thursday, tunneled catheter removal requested.    Plan:  -Please place order for IR Procedure, approving attending Dr. Galvez  -tunneled catheter to be removed after HD tomorrow    Tima Huffman MD, RPVI  Chief Resident, Interventional Radiology  [facility]: [phone] (p) [phone]  [facility]: [phone]

## 2020-08-11 NOTE — PROGRESS NOTE ADULT - SUBJECTIVE AND OBJECTIVE BOX
[facility]   MD NINA MASON MD RUORU WONG, PA    TEL:  OFFICE: [phone]  DR HSU CELL: [phone]  RAS MORELOS CELL: [phone]  DR. MARQUEZ CELL: [phone]  DR. ALEX CELL: [phone]    FROM 5 PM - 7 AM PLEASE CALL ANSWERING SERVICE: [phone]    RENAL FOLLOW UP NOTE  --------------------------------------------------------------------------------  HPI:      Pt seen and examined at bedside.   Denies SOB, chest pain     PAST HISTORY  --------------------------------------------------------------------------------  No significant changes to PMH, PSH, FHx, SHx, unless otherwise noted    ALLERGIES & MEDICATIONS  --------------------------------------------------------------------------------  Allergies    No Known Allergies    Intolerances      Standing Inpatient Medications  amLODIPine   Tablet 10 milliGRAM(s) Oral daily  atorvastatin 80 milliGRAM(s) Oral at bedtime  carvedilol 12.5 milliGRAM(s) Oral every 12 hours  dextrose 5%. 1000 milliLiter(s) IV Continuous <Continuous>  dextrose 50% Injectable 12.5 Gram(s) IV Push once  dextrose 50% Injectable 25 Gram(s) IV Push once  dextrose 50% Injectable 25 Gram(s) IV Push once  furosemide    Tablet 40 milliGRAM(s) Oral daily  heparin  Infusion.  Unit(s)/Hr IV Continuous <Continuous>  insulin glargine Injectable (LANTUS) 4 Unit(s) SubCutaneous every morning  insulin lispro (HumaLOG) corrective regimen sliding scale   SubCutaneous three times a day before meals  insulin lispro (HumaLOG) corrective regimen sliding scale   SubCutaneous at bedtime  insulin lispro Injectable (HumaLOG) 3 Unit(s) SubCutaneous before breakfast  insulin lispro Injectable (HumaLOG) 3 Unit(s) SubCutaneous before lunch  insulin lispro Injectable (HumaLOG) 3 Unit(s) SubCutaneous before dinner  levETIRAcetam 250 milliGRAM(s) Oral two times a day  levETIRAcetam 125 milliGRAM(s) Oral <User Schedule>  levothyroxine 50 MICROGram(s) Oral daily  lisinopril 2.5 milliGRAM(s) Oral daily  multivitamin 1 Tablet(s) Oral daily  senna 1 Tablet(s) Oral at bedtime  sodium chloride 0.9% lock flush 3 milliLiter(s) IV Push every 8 hours    PRN Inpatient Medications  dextrose 40% Gel 15 Gram(s) Oral once PRN  glucagon  Injectable 1 milliGRAM(s) IntraMuscular once PRN  heparin   Injectable 5000 Unit(s) IV Push every 6 hours PRN  heparin   Injectable 2500 Unit(s) IV Push every 6 hours PRN      REVIEW OF SYSTEMS  --------------------------------------------------------------------------------  General: no fever  CVS: no chest pain  RESP: no sob, no cough  ABD: no abdominal pain  : no dysuria,  MSK: no edema     VITALS/PHYSICAL EXAM  --------------------------------------------------------------------------------  T(C): 36.7 (08-11-20 @ 05:05), Max: 36.7 (08-11-20 @ 05:05)  HR: 68 (08-11-20 @ 05:05) (57 - 68)  BP: 160/72 (08-11-20 @ 05:05) (149/64 - 160/72)  RR: 18 (08-11-20 @ 05:05) (18 - 18)  SpO2: 100% (08-11-20 @ 05:05) (97% - 100%)  Wt(kg): --        08-10-20 @ 07:01  -  08-11-20 @ 07:00  --------------------------------------------------------  IN: 1378 mL / OUT: 2525 mL / NET: -1147 mL    08-11-20 @ 07:01  -  08-11-20 @ 09:37  --------------------------------------------------------  IN: 371 mL / OUT: 200 mL / NET: 171 mL      Physical Exam:  	Gen: NAD  	HEENT: MMM  	Pulm: CTA B/L  	CV: S1S2  	Abd: Soft, +BS  	Ext: No LE edema B/L                      Neuro: Awake  alert  	Skin: Warm and Dry   	Vascular access: perma lucas tucker  LABS/STUDIES  --------------------------------------------------------------------------------              12.2   5.61  >-----------<  207      [08-11-20 @ 06:48]              38.9     135  |  96  |  44  ----------------------------<  274      [08-11-20 @ 06:48]  4.7   |  24  |  6.16        Ca     9.3     [08-11-20 @ 06:48]      PT/INR: PT 12.7 , INR 1.07       [08-11-20 @ 07:13]  PTT: 53.9       [08-11-20 @ 07:13]      Creatinine Trend:  SCr 6.16 [08-11 @ 06:48]  SCr 9.46 [08-10 @ 00:33]  SCr 8.01 [08-09 @ 07:20]  SCr 5.26 [08-08 @ 05:26]  SCr 6.48 [08-07 @ 07:11]        Iron 45, TIBC 145, %sat 31      [12-26-19 @ 23:24]  Ferritin 1369      [12-26-19 @ 23:24]  PTH -- (Ca 8.4)      [02-21-20 @ 08:54]   93  HbA1c 6.0      [02-21-20 @ 08:53]  TSH 1.68      [08-08-20 @ 00:47]  Lipid: chol 124, TG 59, HDL 64, LDL 47      [08-08-20 @ 00:47]    HCV 0.24, Nonreact      [08-09-20 @ 19:37]  HIV Nonreact      [08-09-20 @ 19:26]

## 2020-08-11 NOTE — CONSULT NOTE ADULT - SUBJECTIVE AND OBJECTIVE BOX
Vascular & Interventional Radiology Brief Consult Note    Evaluate for Procedure: tunneled catheter removal    HPI: 64y Male with tunneled HD catheter planned for CABG on thursday, tunneled catheter removal prior to CABG is requested.    Allergies:   Medications (Abx/Cardiac/Anticoagulation/Blood Products)  amLODIPine   Tablet: 10 milliGRAM(s) Oral (08-11 @ 05:07)  carvedilol: 12.5 milliGRAM(s) Oral (08-11 @ 05:07)  furosemide    Tablet: 40 milliGRAM(s) Oral (08-11 @ 05:07)  heparin  Infusion.: 1100 Unit(s)/Hr IV Continuous (08-10 @ 22:19)  lisinopril: 2.5 milliGRAM(s) Oral (08-11 @ 08:23)    Data:    T(C): 36.7  HR: 60  BP: 149/72  RR: 16  SpO2: 98%    -WBC 5.61 / HgB 12.2 / Hct 38.9 / Plt 207  -Na 135 / Cl 96 / BUN 44 / Glucose 274  -K 4.7 / CO2 24 / Cr 6.16  -ALT -- / Alk Phos -- / T.Bili --  -INR1.07

## 2020-08-11 NOTE — PROGRESS NOTE ADULT - ASSESSMENT
64 M ESRD on HD MWF undergoing transplant work up, CAD s/p stents, IDDMII, hospital course in December 2019 surrounding CVA c/b status epilepticus requiring intubation and PEG placement s/p removal presented for cardiac angiogram as part of pre-transplant work up, reports of outpatient new Quantiferon positive.    Outpatient D[facility] Center Quantiferon Positive  Inpatient [facility] Quantiferon Negative  CT Chest per radiology report with stable opacities when compared to 1/2020  On my review of the CT it appears that the RUL fissural opacity appears more prominent  In any case in this patient planned for renal transplant so, I would rule out for MTB  Suspicion is relatively low for active disease given no drastic changes in radiology and symptoms      RECOMMENDATIONS:    #Positive Quantiferon, Abnormal Chest CT  --Maintain airborne isolation  --Last AFB sputum in the lab and pending smear  --Followup on AFB sputum culture    #Pre-Renal Transplant  --Can proceed with CABG if 3x AFB sputum smears are negative  --ID clearance for renal transplant pending negative AFB sputum cultures (given positive quantiferon, positive PPD and lung opacities)  --Followup on Strongyloides Serum Ab     #Encounter to Vaccinate patient  --Appears to be UTD for pre-transplant vaccines aside for shingrix (which would have to be done as outpatient)    I will continue to follow. Please feel free to contact me with any further questions.    Carlton Hoover M.D.  [facility] Division of Infectious Disease  8AM-5PM: Pager Number [phone]  After Hours (or if no response): Please contact the Infectious Diseases Office at [phone]     The above assessment and plan were discussed with EDUARD Bran NP 64 M ESRD on HD MWF undergoing transplant work up, CAD s/p stents, IDDMII, hospital course in December 2019 surrounding CVA c/b status epilepticus requiring intubation and PEG placement s/p removal presented for cardiac angiogram as part of pre-transplant work up, reports of outpatient new Quantiferon positive.    Outpatient Dialysis Center Quantiferon Positive  Inpatient [facility] Quantiferon Negative  CT Chest per radiology report with stable opacities when compared to 1/2020  On my review of the CT it appears that the RUL fissural opacity appears more prominent  In any case in this patient planned for renal transplant so, I would rule out for MTB  Suspicion is relatively low for active disease given no drastic changes in radiology and symptoms      RECOMMENDATIONS:    #Positive Quantiferon, Abnormal Chest CT  --Maintain airborne isolation  --Last AFB sputum in the lab and pending smear  --Followup on AFB sputum culture    #Pre-Renal Transplant  --Can proceed with CABG if 3x AFB sputum smears are negative  --ID clearance for renal transplant pending negative AFB sputum cultures (given positive quantiferon, positive PPD and lung opacities)  --Followup on Strongyloides Serum Ab     #Encounter to Vaccinate patient  --Appears to be UTD for pre-transplant vaccines aside for shingrix (which would have to be done as outpatient)    I will be away tomorrow. Please contact the Infectious Diseases Office to contact the covering Infectious Diseases Attending.     Carlton Hoover M.D.  [facility] Division of Infectious Disease  8AM-5PM: Pager Number [phone]  After Hours (or if no response): Please contact the Infectious Diseases Office at [phone]     The above assessment and plan were discussed with EDUARD Bran NP

## 2020-08-12 ENCOUNTER — TRANSCRIPTION ENCOUNTER (OUTPATIENT)
Age: 65
End: 2020-08-12

## 2020-08-12 DIAGNOSIS — A15.0 TUBERCULOSIS OF LUNG: ICD-10-CM

## 2020-08-12 LAB
ANION GAP SERPL CALC-SCNC: 18 MMOL/L — HIGH (ref 5–17)
APTT BLD: 73.6 SEC — HIGH (ref 27.5–35.5)
BUN SERPL-MCNC: 68 MG/DL — HIGH (ref 7–23)
CALCIUM SERPL-MCNC: 9.4 MG/DL — SIGNIFICANT CHANGE UP (ref 8.4–10.5)
CHLORIDE SERPL-SCNC: 94 MMOL/L — LOW (ref 96–108)
CO2 SERPL-SCNC: 23 MMOL/L — SIGNIFICANT CHANGE UP (ref 22–31)
CREAT SERPL-MCNC: 7.96 MG/DL — HIGH (ref 0.5–1.3)
GLUCOSE BLDC GLUCOMTR-MCNC: 136 MG/DL — HIGH (ref 70–99)
GLUCOSE BLDC GLUCOMTR-MCNC: 154 MG/DL — HIGH (ref 70–99)
GLUCOSE BLDC GLUCOMTR-MCNC: 182 MG/DL — HIGH (ref 70–99)
GLUCOSE BLDC GLUCOMTR-MCNC: 183 MG/DL — HIGH (ref 70–99)
GLUCOSE BLDC GLUCOMTR-MCNC: 227 MG/DL — HIGH (ref 70–99)
GLUCOSE SERPL-MCNC: 157 MG/DL — HIGH (ref 70–99)
HCT VFR BLD CALC: 37.6 % — LOW (ref 39–50)
HGB BLD-MCNC: 11.8 G/DL — LOW (ref 13–17)
MCHC RBC-ENTMCNC: 28.1 PG — SIGNIFICANT CHANGE UP (ref 27–34)
MCHC RBC-ENTMCNC: 31.4 GM/DL — LOW (ref 32–36)
MCV RBC AUTO: 89.5 FL — SIGNIFICANT CHANGE UP (ref 80–100)
NRBC # BLD: 0 /100 WBCS — SIGNIFICANT CHANGE UP (ref 0–0)
PLATELET # BLD AUTO: 200 K/UL — SIGNIFICANT CHANGE UP (ref 150–400)
POTASSIUM SERPL-MCNC: 4.8 MMOL/L — SIGNIFICANT CHANGE UP (ref 3.5–5.3)
POTASSIUM SERPL-SCNC: 4.8 MMOL/L — SIGNIFICANT CHANGE UP (ref 3.5–5.3)
RBC # BLD: 4.2 M/UL — SIGNIFICANT CHANGE UP (ref 4.2–5.8)
RBC # FLD: 18 % — HIGH (ref 10.3–14.5)
SARS-COV-2 RNA SPEC QL NAA+PROBE: SIGNIFICANT CHANGE UP
SODIUM SERPL-SCNC: 135 MMOL/L — SIGNIFICANT CHANGE UP (ref 135–145)
WBC # BLD: 6.63 K/UL — SIGNIFICANT CHANGE UP (ref 3.8–10.5)
WBC # FLD AUTO: 6.63 K/UL — SIGNIFICANT CHANGE UP (ref 3.8–10.5)

## 2020-08-12 RX ORDER — CHLORHEXIDINE GLUCONATE 213 G/1000ML
1 SOLUTION TOPICAL ONCE
Refills: 0 | Status: COMPLETED | OUTPATIENT
Start: 2020-08-12 | End: 2020-08-12

## 2020-08-12 RX ORDER — LANOLIN ALCOHOL/MO/W.PET/CERES
5 CREAM (GRAM) TOPICAL AT BEDTIME
Refills: 0 | Status: DISCONTINUED | OUTPATIENT
Start: 2020-08-12 | End: 2020-08-13

## 2020-08-12 RX ORDER — CEFUROXIME AXETIL 250 MG
1500 TABLET ORAL ONCE
Refills: 0 | Status: DISCONTINUED | OUTPATIENT
Start: 2020-08-12 | End: 2020-08-13

## 2020-08-12 RX ORDER — CHLORHEXIDINE GLUCONATE 213 G/1000ML
30 SOLUTION TOPICAL ONCE
Refills: 0 | Status: COMPLETED | OUTPATIENT
Start: 2020-08-12 | End: 2020-08-13

## 2020-08-12 RX ORDER — CHLORHEXIDINE GLUCONATE 213 G/1000ML
1 SOLUTION TOPICAL ONCE
Refills: 0 | Status: DISCONTINUED | OUTPATIENT
Start: 2020-08-12 | End: 2020-08-12

## 2020-08-12 RX ADMIN — HEPARIN SODIUM 10 UNIT(S)/HR: 5000 INJECTION INTRAVENOUS; SUBCUTANEOUS at 18:21

## 2020-08-12 RX ADMIN — CARVEDILOL PHOSPHATE 12.5 MILLIGRAM(S): 80 CAPSULE, EXTENDED RELEASE ORAL at 16:42

## 2020-08-12 RX ADMIN — SODIUM CHLORIDE 3 MILLILITER(S): 9 INJECTION INTRAMUSCULAR; INTRAVENOUS; SUBCUTANEOUS at 05:20

## 2020-08-12 RX ADMIN — CHLORHEXIDINE GLUCONATE 1 APPLICATION(S): 213 SOLUTION TOPICAL at 20:34

## 2020-08-12 RX ADMIN — LEVETIRACETAM 250 MILLIGRAM(S): 250 TABLET, FILM COATED ORAL at 16:42

## 2020-08-12 RX ADMIN — Medication 3 UNIT(S): at 17:26

## 2020-08-12 RX ADMIN — SODIUM CHLORIDE 3 MILLILITER(S): 9 INJECTION INTRAMUSCULAR; INTRAVENOUS; SUBCUTANEOUS at 14:00

## 2020-08-12 RX ADMIN — LEVETIRACETAM 250 MILLIGRAM(S): 250 TABLET, FILM COATED ORAL at 05:15

## 2020-08-12 RX ADMIN — Medication 1: at 08:48

## 2020-08-12 RX ADMIN — Medication 50 MICROGRAM(S): at 05:15

## 2020-08-12 RX ADMIN — AMLODIPINE BESYLATE 10 MILLIGRAM(S): 2.5 TABLET ORAL at 05:15

## 2020-08-12 RX ADMIN — ATORVASTATIN CALCIUM 80 MILLIGRAM(S): 80 TABLET, FILM COATED ORAL at 21:31

## 2020-08-12 RX ADMIN — Medication 1 TABLET(S): at 13:15

## 2020-08-12 RX ADMIN — Medication 1: at 13:16

## 2020-08-12 RX ADMIN — Medication 3 UNIT(S): at 13:15

## 2020-08-12 RX ADMIN — SODIUM CHLORIDE 3 MILLILITER(S): 9 INJECTION INTRAMUSCULAR; INTRAVENOUS; SUBCUTANEOUS at 21:30

## 2020-08-12 RX ADMIN — SENNA PLUS 1 TABLET(S): 8.6 TABLET ORAL at 21:31

## 2020-08-12 RX ADMIN — Medication 40 MILLIGRAM(S): at 05:15

## 2020-08-12 RX ADMIN — Medication 3 UNIT(S): at 08:49

## 2020-08-12 RX ADMIN — LISINOPRIL 2.5 MILLIGRAM(S): 2.5 TABLET ORAL at 05:20

## 2020-08-12 RX ADMIN — Medication 5 MILLIGRAM(S): at 21:43

## 2020-08-12 RX ADMIN — LEVETIRACETAM 125 MILLIGRAM(S): 250 TABLET, FILM COATED ORAL at 13:15

## 2020-08-12 RX ADMIN — CARVEDILOL PHOSPHATE 12.5 MILLIGRAM(S): 80 CAPSULE, EXTENDED RELEASE ORAL at 05:15

## 2020-08-12 NOTE — PRE-ANESTHESIA EVALUATION ADULT - NSANTHPROCED_GEN_ALL_CORE
Transesophageal Echocardiogram Arterial Catheter/Transesophageal Echocardiogram/Central Venous Catheter

## 2020-08-12 NOTE — PROGRESS NOTE ADULT - ASSESSMENT
65 yo M w/ PMH HTN, CAD s/p LAD stent 2009, 2019, carotid stenosis, ESRD on HD (MWF Dr Cardenas, [place] [facility]- on transplant list), DM (on IV insulin, controlled, managed by Jamie Corral), CVA c/b status epilepticus requiring intubation and PEG in 12/2019-went to rehab, peg was removed July 2020, dysphagia s/p PEG, atrial fibrillation (on eliquis last dose 8/5/2020), anemia and GI bleed (Feb 2020) who presents today for cardiac angiogram. Patient reports cardiac evaluation for renal transplant- pharm stress test completed on 7/31/2020, revealing abnormal study: there is medium sized, moderate to severe defects in mid to distal anterior, mid to distal anterolateral walls predominantly reversible consistent with infarction with sig.. zackery- infarct ischemia. LVEF 45%. Recommended for cardiac angiogram for further ischemic evaluation. Patient is s/p cath-TVD-plan for CABG 8/13/20 with Dr. Guillory.     Son [phone] (07 Aug 2020 06:50)    8/12 VSS; AFB x3 Negative - HD today, plan to remove permacath by IR after HD. Plan for OR with Dr. Guillory for CABG tomorrow 8/13/20.

## 2020-08-12 NOTE — PRE-OP CHECKLIST - SELECT TESTS ORDERED
PT/PTT/BMP/CBC/POCT Blood Glucose BMP/CBC/PT/PTT/Type and Cross/Type and Screen/POCT Blood Glucose/EKG/CXR

## 2020-08-12 NOTE — CHART NOTE - NSCHARTNOTEFT_GEN_A_CORE
Removal of right chest wall tunneled HD catheter was requested ahead of planned CABG tomorrow. Tunneled catheter was removed under local anesthesia. Dry sterile dressing was ap[plied.    Tima Huffman MD, RPVI  Chief Resident, Interventional Radiology  [facility]: [phone] (p) [phone]  [facility]: [phone] Removal of right chest wall tunneled HD catheter was requested ahead of planned CABG tomorrow. Tunneled catheter was removed under local anesthesia. Dry sterile dressing was applied.    Heparin drip may be restarted in one hour.    Tima Huffman MD, RPVI  Chief Resident, Interventional Radiology  [facility]: [phone] (p) [phone]  [facility]: [phone]

## 2020-08-12 NOTE — PROGRESS NOTE ADULT - SUBJECTIVE AND OBJECTIVE BOX
Cardiac Surgery Pre-op Note:    CC: Patient is a 64y old  Male who presents with a chief complaint of CAD (11 Aug 2020 12:14)  Subjective: Patient is seen sitting comfortably in bed getting dialysis. Patient offers no acute complaints. Aware of going to surgery tomorrow 8/13 with Dr. Guillory.     Referring Physician: Dr. Bose                                                                                                            Surgeon: Dr. Guillory 	    Procedure: (Date) (Procedure) 8/13/20 CABG    Allergies    No Known Allergies    Intolerances      PAST MEDICAL & SURGICAL HISTORY:  Intubation of airway performed without difficulty: Dec 2019  CVA c/b status epilepticus requiring intubation and PEG in 12/2019-  PEG (percutaneous endoscopic gastrostomy) status: removed July 2020  Anemia  CVA (cerebral vascular accident): 12/13/19 with residual bilateral weakness  Hypothyroidism  CRI (Chronic Renal Insufficiency)  CAD (Coronary Artery Disease): with Stents in 06/2009 and 6/2019  Dyslipidemia  Diabetes  Hypertension  History of insertion of stent into coronary artery bypass graft: 2009 and 2019      MEDICATIONS  (STANDING):  amLODIPine   Tablet 10 milliGRAM(s) Oral daily  atorvastatin 80 milliGRAM(s) Oral at bedtime  carvedilol 12.5 milliGRAM(s) Oral every 12 hours  dextrose 5%. 1000 milliLiter(s) (50 mL/Hr) IV Continuous <Continuous>  dextrose 50% Injectable 12.5 Gram(s) IV Push once  dextrose 50% Injectable 25 Gram(s) IV Push once  dextrose 50% Injectable 25 Gram(s) IV Push once  furosemide    Tablet 40 milliGRAM(s) Oral daily  heparin  Infusion 1000 Unit(s)/Hr (10 mL/Hr) IV Continuous <Continuous>  insulin lispro (HumaLOG) corrective regimen sliding scale   SubCutaneous three times a day before meals  insulin lispro (HumaLOG) corrective regimen sliding scale   SubCutaneous at bedtime  insulin lispro Injectable (HumaLOG) 3 Unit(s) SubCutaneous before breakfast  insulin lispro Injectable (HumaLOG) 3 Unit(s) SubCutaneous before lunch  insulin lispro Injectable (HumaLOG) 3 Unit(s) SubCutaneous before dinner  levETIRAcetam 250 milliGRAM(s) Oral two times a day  levETIRAcetam 125 milliGRAM(s) Oral <User Schedule>  levothyroxine 50 MICROGram(s) Oral daily  multivitamin 1 Tablet(s) Oral daily  senna 1 Tablet(s) Oral at bedtime  sodium chloride 0.9% lock flush 3 milliLiter(s) IV Push every 8 hours    MEDICATIONS  (PRN):  dextrose 40% Gel 15 Gram(s) Oral once PRN Blood Glucose LESS THAN 70 milliGRAM(s)/deciliter  glucagon  Injectable 1 milliGRAM(s) IntraMuscular once PRN Glucose LESS THAN 70 milligrams/deciliter      Labs:                        11.8   6.63  )-----------( 200      ( 12 Aug 2020 03:43 )             37.6     08-12    135  |  94<L>  |  68<H>  ----------------------------<  157<H>  4.8   |  23  |  7.96<H>    Ca    9.4      12 Aug 2020 03:43      PT/INR - ( 11 Aug 2020 07:13 )   PT: 12.7 sec;   INR: 1.07 ratio         PTT - ( 12 Aug 2020 03:43 )  PTT:73.6 sec    Blood Type: ABO Interpretation: B (08-11 @ 21:24)    HGB A1C: 6.6%  Prealbumin:   Pro-BNP:   Thyroid Panel: 08-08 @ 00:47/1.68  --/5.8/75    MRSA: MRSA PCR Result.: NotDetec (08-08 @ 11:25)   / MSSA:     COVID:     CXR:   < from: Xray Chest 1 View- PORTABLE-Routine (08.08.20 @ 09:55) >  INTERPRETATION:  Indication: Preoperative chest radiograph.  Technique: Single portable view of the chest.  Comparison: 7/27/2020  Findings:The cardiac silhouette is normal in size. There is a dialysis catheter with tip overlying the superior vena cava. The lungs are clear.  Impression: Clear lungs.    < end of copied text >    EKG:  < from: 12 Lead ECG (08.07.20 @ 07:04) >  Ventricular Rate 75 BPM  Atrial Rate 75 BPM  P-R Interval 192 ms  QRS Duration 148 ms  Q-T Interval 440 ms  QTC Calculation(Bezet) 491 ms  P Axis 66 degrees  R Axis 20 degrees  T Axis 119 degrees  Diagnosis Line NORMAL SINUS RHYTHM  LEFT BUNDLE BRANCH BLOCK  ABNORMAL ECG    < end of copied text >    Carotid Duplex:    < from: VA Duplex Carotid, Bilat (08.10.20 @ 16:33) >  FINDINGS:    Mild to moderate atheromatous plaque is present bilaterally in the regions of the bifurcations of the common carotid arteriesinto internal and external branches.  Blood flow velocities are as follows:    RIGHT:  PROX CCA = 69 ;  DIST CCA = 74 ;  PROX ICA = 160 ;  DIST ICA = 75 ;  ECA = 111    LEFT:  PROX CCA = 96 ;  DIST CCA = 94 ;  PROX ICA = 145 ;  DIST ICA = 73 ;  ECA= 181    Antegrade flow is noted within both vertebral arteries.  IMPRESSION: On this examination, there are moderate, 50-69% stenoses of both the right and left internal carotid arteries and a flow-limiting stenosis of the left external carotid artery.    < end of copied text >    PFT's:    Echocardiogram:  < from: TTE with Doppler (w/3D Echo) (08.08.20 @ 07:31) >  Observations:  Mitral Valve: Mitral annular calcification, otherwise  normal mitral valve. Minimalmitral regurgitation.  Aortic Valve/Aorta: Calcified trileaflet aortic valve with  normal opening. Peak transaortic valve gradient equals 5 mm  Hg. Peak left ventricular outflow tract gradient equals 4  mm Hg, mean gradient is equal to 2 mm Hg, LVOT velocity  time integral equals 22 cm.  Aortic Root: 3.3 cm.  Left Atrium: Normal left atrium.  LA volume index = 25  cc/m2.  Left Ventricle: Normal left ventricular systolic function.  No segmental wall motion abnormalities. Normal left  ventricular internal dimensions and wall thicknesses. Mild  diastolic dysfunction (Stage I).  Right Heart: Normal right atrium. Normal right ventricular  size and function. Normal tricuspid valve. Normal pulmonic  valve.  Pericardium/Pleura: Normal pericardium with no pericardial  effusion.  Hemodynamic: Estimated right atrial pressure is 8 mm Hg.  ------------------------------------------------------------------------  Conclusions:  1. Normal left atrium.  LA volume index = 25 cc/m2.  2. Normal left ventricular internal dimensions and wall  thicknesses.  3. Normal left ventricular systolic function. No segmental  wall motion abnormalities.  4. Mild diastolic dysfunction (Stage I).  5. Normal right ventricular size and function    < end of copied text >    Cardiac catheterization:  < from: Cardiac Cath Lab - Adult (08.07.20 @ 12:42) >  CORONARY VESSELS: The coronary circulation is left dominant.  LM:   --  Distal left main: There was a 70 % stenosis.  LAD:   --  Ostial LAD: There was a 75 % stenosis.  --  Proximal LAD: There was a 80 % stenosis at the site proximal edge  stent.  CX:   --  Mid circumflex: There was a 40 % stenosis.  --  OM1: There was a 0 % stenosis at the site of a prior stent.  RCA:   --  RCA: The vessel was very small sized. Angiography showed severe  atherosclerosis.  COMPLICATIONS: There were no complications.  SUMMARY:  CORONARY VESSELS: Distal left main: There was a 70 % stenosis. Ostial LAD:  There was a 75 % stenosis. Mid circumflex: There was a 40 % stenosis. OM1:  There was a 0 % stenosis at the site of a prior stent. RCA: The vessel was  very small sized. Angiography showed severe atherosclerosis.  CARDIAC STRUCTURES: Global left ventricular function was normal. EF  estimated was 60 %.    < end of copied text >    Vein Mapping:    Gen: WN/WD NAD  Neuro: AAOx3, nonfocal  Pulm: CTA B/L  CV: RRR, S1S2  Abd: Soft, NT, ND +BS  Ext: No edema, + peripheral pulses      Pt has AICD/PPM [ ] Yes  [x ] No             Brand Name:  Pre-op Beta Blocker ordered within 24 hrs of surgery (CABG ONLY)?  [x ] Yes  [ ] No  If not, Why?  Type & Cross  [x ] Yes  [ ] No  NPO after Midnight [x ] Yes  [ ] No  Pre-op ABX ordered, to be taped on chart:  [x ] Yes  [ ] No     Hibiclens/Peridex ordered [x ] Yes  [ ] No  Intraop on Hold: PRBCs, CXR, DINAH [x ]   Consent obtained  [x ] Yes  [ ] No

## 2020-08-12 NOTE — PROGRESS NOTE ADULT - ASSESSMENT
ESRD on HD ( MWF) via perma cath  PT from [facility]  s/p cath 8/7  s/p HD on 8/10 with 2 L UF  Plan for HD today - followed by permacath removal for OR on Thursday  Planned for CABG  on Thursday  Consent obtained from son at [phone]  MOnitor BMP    Anemia  Hb at goal for ESRD   Monitor at present    HTN  Bp elevatd  Titrate lisinopril if BP remains elevated   MOnitor BP  Low salt diet    CKD -BMD  Check PTH  Monitor serum calcium and PO4

## 2020-08-12 NOTE — PRE-ANESTHESIA EVALUATION ADULT - NSANTHVITALSIGNSFT_GEN_ALL_CORE
Vital Signs Last 24 Hrs  T(C): 36.5 (12 Aug 2020 15:16), Max: 36.7 (12 Aug 2020 05:13)  T(F): 97.7 (12 Aug 2020 15:16), Max: 98.1 (12 Aug 2020 05:13)  HR: 63 (12 Aug 2020 17:25) (57 - 72)  BP: 138/78 (12 Aug 2020 17:25) (138/66 - 173/73)  BP(mean): 98 (12 Aug 2020 17:25) (98 - 106)  RR: 17 (12 Aug 2020 12:45) (16 - 18)  SpO2: 100% (12 Aug 2020 15:16) (97% - 100%)

## 2020-08-12 NOTE — PROGRESS NOTE ADULT - SUBJECTIVE AND OBJECTIVE BOX
[facility] [place] [facility]   MD NINA MASON MD RUORU WONG, PA    TEL:  OFFICE: [phone]  DR HSU CELL: [phone]  RAS MORELOS CELL: [phone]  DR. MARQUEZ CELL: [phone]  DR. ALEX CELL: [phone]    FROM 5 PM - 7 AM PLEASE CALL ANSWERING SERVICE: [phone]    RENAL FOLLOW UP NOTE  --------------------------------------------------------------------------------  HPI:      Pt seen and examined at bedside.   Denies SOB, chest pain     PAST HISTORY  --------------------------------------------------------------------------------  No significant changes to PMH, PSH, FHx, SHx, unless otherwise noted    ALLERGIES & MEDICATIONS  --------------------------------------------------------------------------------  Allergies    No Known Allergies    Intolerances      Standing Inpatient Medications  amLODIPine   Tablet 10 milliGRAM(s) Oral daily  atorvastatin 80 milliGRAM(s) Oral at bedtime  carvedilol 12.5 milliGRAM(s) Oral every 12 hours  dextrose 5%. 1000 milliLiter(s) IV Continuous <Continuous>  dextrose 50% Injectable 12.5 Gram(s) IV Push once  dextrose 50% Injectable 25 Gram(s) IV Push once  dextrose 50% Injectable 25 Gram(s) IV Push once  furosemide    Tablet 40 milliGRAM(s) Oral daily  heparin  Infusion 1000 Unit(s)/Hr IV Continuous <Continuous>  insulin lispro (HumaLOG) corrective regimen sliding scale   SubCutaneous three times a day before meals  insulin lispro (HumaLOG) corrective regimen sliding scale   SubCutaneous at bedtime  insulin lispro Injectable (HumaLOG) 3 Unit(s) SubCutaneous before breakfast  insulin lispro Injectable (HumaLOG) 3 Unit(s) SubCutaneous before lunch  insulin lispro Injectable (HumaLOG) 3 Unit(s) SubCutaneous before dinner  levETIRAcetam 250 milliGRAM(s) Oral two times a day  levETIRAcetam 125 milliGRAM(s) Oral <User Schedule>  levothyroxine 50 MICROGram(s) Oral daily  multivitamin 1 Tablet(s) Oral daily  senna 1 Tablet(s) Oral at bedtime  sodium chloride 0.9% lock flush 3 milliLiter(s) IV Push every 8 hours    PRN Inpatient Medications  dextrose 40% Gel 15 Gram(s) Oral once PRN  glucagon  Injectable 1 milliGRAM(s) IntraMuscular once PRN      REVIEW OF SYSTEMS  --------------------------------------------------------------------------------  General: no fever  CVS: no chest pain  RESP: no sob, no cough  ABD: no abdominal pain  : no dysuria,  MSK: no edema     VITALS/PHYSICAL EXAM  --------------------------------------------------------------------------------  T(C): 36.7 (08-12-20 @ 05:13), Max: 36.7 (08-11-20 @ 12:38)  HR: 57 (08-12-20 @ 07:10) (57 - 72)  BP: 158/72 (08-12-20 @ 05:13) (149/72 - 159/68)  RR: 16 (08-12-20 @ 05:13) (16 - 18)  SpO2: 97% (08-12-20 @ 05:13) (97% - 99%)  Wt(kg): --        08-11-20 @ 07:01  -  08-12-20 @ 07:00  --------------------------------------------------------  IN: 790 mL / OUT: 600 mL / NET: 190 mL      Physical Exam:  	Gen: NAD  	HEENT: MMM  	Pulm: CTA B/L  	CV: S1S2  	Abd: Soft, +BS  	Ext: No LE edema B/L                      Neuro: Awake alert  	Skin: Warm and Dry   	Vascular access: perma cath          FABIÁN tucker  LABS/STUDIES  --------------------------------------------------------------------------------              11.8   6.63  >-----------<  200      [08-12-20 @ 03:43]              37.6     135  |  94  |  68  ----------------------------<  157      [08-12-20 @ 03:43]  4.8   |  23  |  7.96        Ca     9.4     [08-12-20 @ 03:43]      PT/INR: PT 12.7 , INR 1.07       [08-11-20 @ 07:13]  PTT: 73.6       [08-12-20 @ 03:43]      Creatinine Trend:  SCr 7.96 [08-12 @ 03:43]  SCr 6.16 [08-11 @ 06:48]  SCr 9.46 [08-10 @ 00:33]  SCr 8.01 [08-09 @ 07:20]  SCr 5.26 [08-08 @ 05:26]        Iron 45, TIBC 145, %sat 31      [12-26-19 @ 23:24]  Ferritin 1369      [12-26-19 @ 23:24]  PTH -- (Ca 8.4)      [02-21-20 @ 08:54]   93  HbA1c 6.0      [02-21-20 @ 08:53]  TSH 1.68      [08-08-20 @ 00:47]  Lipid: chol 124, TG 59, HDL 64, LDL 47      [08-08-20 @ 00:47]    HCV 0.24, Nonreact      [08-09-20 @ 19:37]  HIV Nonreact      [08-09-20 @ 19:26]

## 2020-08-12 NOTE — PROGRESS NOTE ADULT - PROBLEM SELECTOR PLAN 2
ESRD on HD (MWF)  HD today via permacatjosefa PATE'c permacatjosefa by IR today after HD for OR tmmrw

## 2020-08-12 NOTE — PRE-ANESTHESIA EVALUATION ADULT - NSANTHLABRESULTSFT_GEN_ALL_CORE
COVID-19 PCR: NotDetec (12 Aug 2020 12:45)                            11.8   6.63  )-----------( 200      ( 12 Aug 2020 03:43 )             37.6     08-12    135  |  94<L>  |  68<H>  ----------------------------<  157<H>  4.8   |  23  |  7.96<H>    Ca    9.4      12 Aug 2020 03:43    PT/INR - ( 11 Aug 2020 07:13 )   PT: 12.7 sec;   INR: 1.07 ratio         PTT - ( 12 Aug 2020 03:43 )  PTT:73.6 sec

## 2020-08-12 NOTE — PROGRESS NOTE ADULT - PROBLEM SELECTOR PLAN 1
s/p Cath -TVD Plan for CABG with Dr. Guillory OR tomorrow 8/13/20  Preop workup  Continue coreg12.5bid   continue Lasix 40mg daily   continue Norvasc 10   Continue on Hep gtt 10U   ck PTT

## 2020-08-12 NOTE — PRE-ANESTHESIA EVALUATION ADULT - NSRADCARDRESULTSFT_GEN_ALL_CORE
< from: TTE with Doppler (w/3D Echo) (08.08.20 @ 07:31) >    Conclusions:  1. Normal left atrium.  LA volume index = 25 cc/m2.  2. Normal left ventricular internal dimensions and wall  thicknesses.  3. Normal left ventricular systolic function. No segmental  wall motion abnormalities.  4. Mild diastolic dysfunction (Stage I).  5. Normal right ventricular size and function.  ------------------------------------------------------------------------  < from: Nuclear Stress Test-Pharmacologic (07.31.20 @ 11:06) >    IMPRESSIONS:Abnormal Study  * Chest Pain: No chest pain with administration of  Regadenoson. * Symptom: No Symptom.  * HR Response: Appropriate. * BP Response: Appropriate.  * Heart Rhythm: Sinus Rhythm - 69 BPM. * Conduction  defects: Left bundle branch block.  * ECG Abnormalities: ECG changes could not be interpreted  due to bundle branch block.  * Arrhythmia: None.  * The left ventricle was enlarged. There are medium sized,  moderate to severe defects in mid to distal anterior, mid  to distal anterolateral walls that are predominantly  reversible consistent with infarction with significant  zackery-infarct ischemia.There is a small, mild defect in  proximal to mid inferior wall that is reversible  consistent with ischemia.  * Post-stress gated wall motion analysis was performed  (LVEF = 45 %;LVEDV = 146 ml.), revealing hypokinesis in  mid to distal anterior, mid to distal anterolateral walls.  *** Compared with Nuclear/Stress test of 3/1/2017, the  ventricle is now enlarged with segmental dysfunction and  new regions of ischemia are noted.  ------------------------------------------------------------------------  Confirmed on  7/31/2020 - 14:19:24 by Roderick Lovelace M.D.    < end of copied text >    < from: Cardiac Cath Lab - Adult (08.07.20 @ 12:42) >    VENTRICLES: Global left ventricular function was normal. EF estimated was  60 %.  CORONARY VESSELS: The coronary circulation is left dominant.  LM:   --  Distal left main: There was a 70 % stenosis.  LAD:   --  Ostial LAD: There was a 75 % stenosis.  --  Proximal LAD: There was a 80 % stenosis at the site proximal edge  stent.  CX:   --  Mid circumflex: There was a 40 % stenosis.  --  OM1: There was a 0 % stenosis at the site of a prior stent.  RCA:   --  RCA: The vessel was very small sized. Angiography showed severe  atherosclerosis.  COMPLICATIONS: There were no complications.  SUMMARY:  CORONARY VESSELS: Distal left main: There was a 70 % stenosis. Ostial LAD:  There was a 75 % stenosis. Mid circumflex: There was a 40 % stenosis. OM1:  There was a 0 % stenosis at the site of a prior stent. RCA: The vessel was  very small sized. Angiography showed severe atherosclerosis.  CARDIAC STRUCTURES: Global left ventricular function was normal. EF  estimated was 60 %.  DIAGNOSTIC RECOMMENDATIONS: Consultation be obtained for surgical opinion  and coronary artery bypass grafting.  Prepared and signed by  Berenice Bose M.D.  Signed 08/07/2020 13:50:54    < end of copied text >      Confirmed on  8/8/2020 - 12:41:38 by David Crowder M.D.  ------------------------------------------------------------------------    < end of copied text >

## 2020-08-12 NOTE — PRE-ANESTHESIA EVALUATION ADULT - NSANTHPEFT_GEN_ALL_CORE
PHYSICAL EXAM:  GENERAL: No acute distress,   HEAD: Atraumatic, Normocephalic  RESP: Normal respiratory pattern    HEART: Regular rate and rhythm  PSYCH: AAOx3  NEUROLOGY: RUE weakness compared to TEX

## 2020-08-12 NOTE — PRE-ANESTHESIA EVALUATION ADULT - NSANTHPMHFT_GEN_ALL_CORE
64M with PMH of HTN, ESRD ( MWF-last 8/12), CAD ( s/p stents x2 2009,2019), Carotid Stenosis, CVA, Seizures, AFIB, (+) QuantiFeron with Lung findings, neg AFB smearsx3. On pre-transplant w/u had positive stress f/u cath consistent with multivessel CAD, now for CABG on 8/13- NKDA, EF=60-65%

## 2020-08-13 ENCOUNTER — APPOINTMENT (OUTPATIENT)
Dept: CARDIOTHORACIC SURGERY | Facility: CLINIC | Age: 65
End: 2020-08-13

## 2020-08-13 PROBLEM — Z93.1 GASTROSTOMY STATUS: Chronic | Status: ACTIVE | Noted: 2020-08-07

## 2020-08-13 PROBLEM — Z78.9 OTHER SPECIFIED HEALTH STATUS: Chronic | Status: ACTIVE | Noted: 2020-08-07

## 2020-08-13 PROBLEM — D64.9 ANEMIA, UNSPECIFIED: Chronic | Status: ACTIVE | Noted: 2020-08-07

## 2020-08-13 LAB
ALBUMIN SERPL ELPH-MCNC: 3.3 G/DL — SIGNIFICANT CHANGE UP (ref 3.3–5)
ALP SERPL-CCNC: 41 U/L — SIGNIFICANT CHANGE UP (ref 40–120)
ALT FLD-CCNC: 12 U/L — SIGNIFICANT CHANGE UP (ref 10–45)
ANION GAP SERPL CALC-SCNC: 15 MMOL/L — SIGNIFICANT CHANGE UP (ref 5–17)
APTT BLD: 65.8 SEC — HIGH (ref 27.5–35.5)
APTT BLD: 68.7 SEC — HIGH (ref 27.5–35.5)
AST SERPL-CCNC: 15 U/L — SIGNIFICANT CHANGE UP (ref 10–40)
BASE EXCESS BLDV CALC-SCNC: -1.7 MMOL/L — SIGNIFICANT CHANGE UP (ref -2–2)
BASOPHILS # BLD AUTO: 0.01 K/UL — SIGNIFICANT CHANGE UP (ref 0–0.2)
BASOPHILS NFR BLD AUTO: 0.1 % — SIGNIFICANT CHANGE UP (ref 0–2)
BILIRUB SERPL-MCNC: 0.4 MG/DL — SIGNIFICANT CHANGE UP (ref 0.2–1.2)
BUN SERPL-MCNC: 56 MG/DL — HIGH (ref 7–23)
CALCIUM SERPL-MCNC: 9.1 MG/DL — SIGNIFICANT CHANGE UP (ref 8.4–10.5)
CHLORIDE SERPL-SCNC: 96 MMOL/L — SIGNIFICANT CHANGE UP (ref 96–108)
CK MB CFR SERPL CALC: 3.6 NG/ML — SIGNIFICANT CHANGE UP (ref 0–6.7)
CK SERPL-CCNC: 83 U/L — SIGNIFICANT CHANGE UP (ref 30–200)
CO2 BLDV-SCNC: 25 MMOL/L — SIGNIFICANT CHANGE UP (ref 22–30)
CO2 SERPL-SCNC: 22 MMOL/L — SIGNIFICANT CHANGE UP (ref 22–31)
CREAT SERPL-MCNC: 5.95 MG/DL — HIGH (ref 0.5–1.3)
EOSINOPHIL # BLD AUTO: 0.09 K/UL — SIGNIFICANT CHANGE UP (ref 0–0.5)
EOSINOPHIL NFR BLD AUTO: 0.9 % — SIGNIFICANT CHANGE UP (ref 0–6)
FIBRINOGEN PPP-MCNC: 276 MG/DL — LOW (ref 350–510)
GAS PNL BLDA: SIGNIFICANT CHANGE UP
GLUCOSE BLDC GLUCOMTR-MCNC: 119 MG/DL — HIGH (ref 70–99)
GLUCOSE BLDC GLUCOMTR-MCNC: 125 MG/DL — HIGH (ref 70–99)
GLUCOSE BLDC GLUCOMTR-MCNC: 173 MG/DL — HIGH (ref 70–99)
GLUCOSE BLDC GLUCOMTR-MCNC: 72 MG/DL — SIGNIFICANT CHANGE UP (ref 70–99)
GLUCOSE BLDC GLUCOMTR-MCNC: 79 MG/DL — SIGNIFICANT CHANGE UP (ref 70–99)
GLUCOSE BLDC GLUCOMTR-MCNC: 84 MG/DL — SIGNIFICANT CHANGE UP (ref 70–99)
GLUCOSE BLDC GLUCOMTR-MCNC: 87 MG/DL — SIGNIFICANT CHANGE UP (ref 70–99)
GLUCOSE BLDC GLUCOMTR-MCNC: 98 MG/DL — SIGNIFICANT CHANGE UP (ref 70–99)
GLUCOSE SERPL-MCNC: 111 MG/DL — HIGH (ref 70–99)
HCO3 BLDV-SCNC: 23 MMOL/L — SIGNIFICANT CHANGE UP (ref 21–29)
HCT VFR BLD CALC: 27 % — LOW (ref 39–50)
HCT VFR BLD CALC: 39.1 % — SIGNIFICANT CHANGE UP (ref 39–50)
HGB BLD-MCNC: 12.2 G/DL — LOW (ref 13–17)
HGB BLD-MCNC: 8.6 G/DL — LOW (ref 13–17)
HOROWITZ INDEX BLDV+IHG-RTO: 40 — SIGNIFICANT CHANGE UP
IMM GRANULOCYTES NFR BLD AUTO: 0.7 % — SIGNIFICANT CHANGE UP (ref 0–1.5)
INR BLD: 1.32 RATIO — HIGH (ref 0.88–1.16)
LYMPHOCYTES # BLD AUTO: 1.28 K/UL — SIGNIFICANT CHANGE UP (ref 1–3.3)
LYMPHOCYTES # BLD AUTO: 13.5 % — SIGNIFICANT CHANGE UP (ref 13–44)
MAGNESIUM SERPL-MCNC: 1.9 MG/DL — SIGNIFICANT CHANGE UP (ref 1.6–2.6)
MCHC RBC-ENTMCNC: 27.8 PG — SIGNIFICANT CHANGE UP (ref 27–34)
MCHC RBC-ENTMCNC: 28.4 PG — SIGNIFICANT CHANGE UP (ref 27–34)
MCHC RBC-ENTMCNC: 31.2 GM/DL — LOW (ref 32–36)
MCHC RBC-ENTMCNC: 31.9 GM/DL — LOW (ref 32–36)
MCV RBC AUTO: 89.1 FL — SIGNIFICANT CHANGE UP (ref 80–100)
MCV RBC AUTO: 89.1 FL — SIGNIFICANT CHANGE UP (ref 80–100)
MONOCYTES # BLD AUTO: 0.19 K/UL — SIGNIFICANT CHANGE UP (ref 0–0.9)
MONOCYTES NFR BLD AUTO: 2 % — SIGNIFICANT CHANGE UP (ref 2–14)
NEUTROPHILS # BLD AUTO: 7.86 K/UL — HIGH (ref 1.8–7.4)
NEUTROPHILS NFR BLD AUTO: 82.8 % — HIGH (ref 43–77)
NRBC # BLD: 0 /100 WBCS — SIGNIFICANT CHANGE UP (ref 0–0)
NRBC # BLD: 0 /100 WBCS — SIGNIFICANT CHANGE UP (ref 0–0)
PCO2 BLDV: 45 MMHG — SIGNIFICANT CHANGE UP (ref 35–50)
PH BLDV: 7.34 — LOW (ref 7.35–7.45)
PHOSPHATE SERPL-MCNC: 4.5 MG/DL — SIGNIFICANT CHANGE UP (ref 2.5–4.5)
PLATELET # BLD AUTO: 132 K/UL — LOW (ref 150–400)
PLATELET # BLD AUTO: 184 K/UL — SIGNIFICANT CHANGE UP (ref 150–400)
PO2 BLDV: 45 MMHG — SIGNIFICANT CHANGE UP (ref 25–45)
POTASSIUM SERPL-MCNC: 4.2 MMOL/L — SIGNIFICANT CHANGE UP (ref 3.5–5.3)
POTASSIUM SERPL-SCNC: 4.2 MMOL/L — SIGNIFICANT CHANGE UP (ref 3.5–5.3)
PROT SERPL-MCNC: 5.3 G/DL — LOW (ref 6–8.3)
PROTHROM AB SERPL-ACNC: 15.5 SEC — HIGH (ref 10.6–13.6)
RBC # BLD: 3.03 M/UL — LOW (ref 4.2–5.8)
RBC # BLD: 4.39 M/UL — SIGNIFICANT CHANGE UP (ref 4.2–5.8)
RBC # FLD: 17.7 % — HIGH (ref 10.3–14.5)
RBC # FLD: 17.9 % — HIGH (ref 10.3–14.5)
SAO2 % BLDV: 73 % — SIGNIFICANT CHANGE UP (ref 67–88)
SODIUM SERPL-SCNC: 133 MMOL/L — LOW (ref 135–145)
STRONGYLOIDES AB SER-ACNC: POSITIVE
TROPONIN T, HIGH SENSITIVITY RESULT: 119 NG/L — HIGH (ref 0–51)
WBC # BLD: 7.93 K/UL — SIGNIFICANT CHANGE UP (ref 3.8–10.5)
WBC # BLD: 9.5 K/UL — SIGNIFICANT CHANGE UP (ref 3.8–10.5)
WBC # FLD AUTO: 7.93 K/UL — SIGNIFICANT CHANGE UP (ref 3.8–10.5)
WBC # FLD AUTO: 9.5 K/UL — SIGNIFICANT CHANGE UP (ref 3.8–10.5)

## 2020-08-13 PROCEDURE — 33508 ENDOSCOPIC VEIN HARVEST: CPT

## 2020-08-13 PROCEDURE — 82728 ASSAY OF FERRITIN: CPT

## 2020-08-13 PROCEDURE — 86891 AUTOLOGOUS BLOOD OP SALVAGE: CPT

## 2020-08-13 PROCEDURE — 99153 MOD SED SAME PHYS/QHP EA: CPT

## 2020-08-13 PROCEDURE — 99152 MOD SED SAME PHYS/QHP 5/>YRS: CPT

## 2020-08-13 PROCEDURE — 99291 CRITICAL CARE FIRST HOUR: CPT

## 2020-08-13 PROCEDURE — 85045 AUTOMATED RETICULOCYTE COUNT: CPT

## 2020-08-13 PROCEDURE — C1751: CPT

## 2020-08-13 PROCEDURE — 86705 HEP B CORE ANTIBODY IGM: CPT

## 2020-08-13 PROCEDURE — 99285 EMERGENCY DEPT VISIT HI MDM: CPT

## 2020-08-13 PROCEDURE — 93010 ELECTROCARDIOGRAM REPORT: CPT

## 2020-08-13 PROCEDURE — C1725: CPT

## 2020-08-13 PROCEDURE — 87340 HEPATITIS B SURFACE AG IA: CPT

## 2020-08-13 PROCEDURE — C1889: CPT

## 2020-08-13 PROCEDURE — C1894: CPT

## 2020-08-13 PROCEDURE — 83036 HEMOGLOBIN GLYCOSYLATED A1C: CPT

## 2020-08-13 PROCEDURE — 83735 ASSAY OF MAGNESIUM: CPT

## 2020-08-13 PROCEDURE — 93454 CORONARY ARTERY ANGIO S&I: CPT | Mod: 59

## 2020-08-13 PROCEDURE — 71045 X-RAY EXAM CHEST 1 VIEW: CPT | Mod: 26

## 2020-08-13 PROCEDURE — 77001 FLUOROGUIDE FOR VEIN DEVICE: CPT

## 2020-08-13 PROCEDURE — 80048 BASIC METABOLIC PNL TOTAL CA: CPT

## 2020-08-13 PROCEDURE — 76937 US GUIDE VASCULAR ACCESS: CPT

## 2020-08-13 PROCEDURE — 86803 HEPATITIS C AB TEST: CPT

## 2020-08-13 PROCEDURE — 83550 IRON BINDING TEST: CPT

## 2020-08-13 PROCEDURE — 82310 ASSAY OF CALCIUM: CPT

## 2020-08-13 PROCEDURE — 84100 ASSAY OF PHOSPHORUS: CPT

## 2020-08-13 PROCEDURE — 36558 INSERT TUNNELED CV CATH: CPT

## 2020-08-13 PROCEDURE — 86706 HEP B SURFACE ANTIBODY: CPT

## 2020-08-13 PROCEDURE — 93571 IV DOP VEL&/PRESS C FLO 1ST: CPT | Mod: LD

## 2020-08-13 PROCEDURE — 33518 CABG ARTERY-VEIN TWO: CPT

## 2020-08-13 PROCEDURE — 82272 OCCULT BLD FECES 1-3 TESTS: CPT

## 2020-08-13 PROCEDURE — 76998 US GUIDE INTRAOP: CPT | Mod: 26,59

## 2020-08-13 PROCEDURE — 80053 COMPREHEN METABOLIC PANEL: CPT

## 2020-08-13 PROCEDURE — C1750: CPT

## 2020-08-13 PROCEDURE — 83970 ASSAY OF PARATHORMONE: CPT

## 2020-08-13 PROCEDURE — 71045 X-RAY EXAM CHEST 1 VIEW: CPT

## 2020-08-13 PROCEDURE — 93005 ELECTROCARDIOGRAM TRACING: CPT

## 2020-08-13 PROCEDURE — 33533 CABG ARTERIAL SINGLE: CPT

## 2020-08-13 PROCEDURE — C1752: CPT

## 2020-08-13 PROCEDURE — 85730 THROMBOPLASTIN TIME PARTIAL: CPT

## 2020-08-13 PROCEDURE — 36800 INSERTION OF CANNULA: CPT

## 2020-08-13 PROCEDURE — 85610 PROTHROMBIN TIME: CPT

## 2020-08-13 PROCEDURE — 36556 INSERT NON-TUNNEL CV CATH: CPT

## 2020-08-13 PROCEDURE — C1874: CPT

## 2020-08-13 PROCEDURE — 84460 ALANINE AMINO (ALT) (SGPT): CPT

## 2020-08-13 PROCEDURE — 83540 ASSAY OF IRON: CPT

## 2020-08-13 PROCEDURE — 85027 COMPLETE CBC AUTOMATED: CPT

## 2020-08-13 PROCEDURE — C9600: CPT | Mod: LD

## 2020-08-13 PROCEDURE — C1769: CPT

## 2020-08-13 PROCEDURE — 86704 HEP B CORE ANTIBODY TOTAL: CPT

## 2020-08-13 PROCEDURE — C1887: CPT

## 2020-08-13 PROCEDURE — 86480 TB TEST CELL IMMUN MEASURE: CPT

## 2020-08-13 PROCEDURE — 99261: CPT

## 2020-08-13 PROCEDURE — 93970 EXTREMITY STUDY: CPT

## 2020-08-13 PROCEDURE — 82962 GLUCOSE BLOOD TEST: CPT

## 2020-08-13 PROCEDURE — 86709 HEPATITIS A IGM ANTIBODY: CPT

## 2020-08-13 RX ORDER — CHLORHEXIDINE GLUCONATE 213 G/1000ML
5 SOLUTION TOPICAL EVERY 4 HOURS
Refills: 0 | Status: DISCONTINUED | OUTPATIENT
Start: 2020-08-13 | End: 2020-08-13

## 2020-08-13 RX ORDER — ASPIRIN/CALCIUM CARB/MAGNESIUM 324 MG
300 TABLET ORAL ONCE
Refills: 0 | Status: COMPLETED | OUTPATIENT
Start: 2020-08-13 | End: 2020-08-13

## 2020-08-13 RX ORDER — HYDROMORPHONE HYDROCHLORIDE 2 MG/ML
0.5 INJECTION INTRAMUSCULAR; INTRAVENOUS; SUBCUTANEOUS ONCE
Refills: 0 | Status: DISCONTINUED | OUTPATIENT
Start: 2020-08-13 | End: 2020-08-13

## 2020-08-13 RX ORDER — NOREPINEPHRINE BITARTRATE/D5W 8 MG/250ML
0.05 PLASTIC BAG, INJECTION (ML) INTRAVENOUS
Qty: 8 | Refills: 0 | Status: DISCONTINUED | OUTPATIENT
Start: 2020-08-13 | End: 2020-08-14

## 2020-08-13 RX ORDER — ASPIRIN/CALCIUM CARB/MAGNESIUM 324 MG
300 TABLET ORAL ONCE
Refills: 0 | Status: COMPLETED | OUTPATIENT
Start: 2020-08-13

## 2020-08-13 RX ORDER — POTASSIUM CHLORIDE 20 MEQ
10 PACKET (EA) ORAL
Refills: 0 | Status: DISCONTINUED | OUTPATIENT
Start: 2020-08-13 | End: 2020-08-13

## 2020-08-13 RX ORDER — DEXTROSE 50 % IN WATER 50 %
50 SYRINGE (ML) INTRAVENOUS
Refills: 0 | Status: DISCONTINUED | OUTPATIENT
Start: 2020-08-13 | End: 2020-08-14

## 2020-08-13 RX ORDER — FENTANYL CITRATE 50 UG/ML
50 INJECTION INTRAVENOUS ONCE
Refills: 0 | Status: DISCONTINUED | OUTPATIENT
Start: 2020-08-13 | End: 2020-08-13

## 2020-08-13 RX ORDER — SODIUM CHLORIDE 9 MG/ML
1000 INJECTION INTRAMUSCULAR; INTRAVENOUS; SUBCUTANEOUS
Refills: 0 | Status: DISCONTINUED | OUTPATIENT
Start: 2020-08-13 | End: 2020-08-19

## 2020-08-13 RX ORDER — AMINOCAPROIC ACID 500 MG/1
5 TABLET ORAL
Qty: 5 | Refills: 0 | Status: COMPLETED | OUTPATIENT
Start: 2020-08-13 | End: 2020-08-13

## 2020-08-13 RX ORDER — POLYETHYLENE GLYCOL 3350 17 G/17G
17 POWDER, FOR SOLUTION ORAL DAILY
Refills: 0 | Status: DISCONTINUED | OUTPATIENT
Start: 2020-08-13 | End: 2020-08-19

## 2020-08-13 RX ORDER — PROPOFOL 10 MG/ML
10 INJECTION, EMULSION INTRAVENOUS
Qty: 500 | Refills: 0 | Status: DISCONTINUED | OUTPATIENT
Start: 2020-08-13 | End: 2020-08-14

## 2020-08-13 RX ORDER — INSULIN HUMAN 100 [IU]/ML
3 INJECTION, SOLUTION SUBCUTANEOUS
Qty: 100 | Refills: 0 | Status: DISCONTINUED | OUTPATIENT
Start: 2020-08-13 | End: 2020-08-14

## 2020-08-13 RX ORDER — DEXTROSE 50 % IN WATER 50 %
50 SYRINGE (ML) INTRAVENOUS ONCE
Refills: 0 | Status: COMPLETED | OUTPATIENT
Start: 2020-08-13 | End: 2020-08-13

## 2020-08-13 RX ORDER — CEFUROXIME AXETIL 250 MG
1500 TABLET ORAL EVERY 12 HOURS
Refills: 0 | Status: COMPLETED | OUTPATIENT
Start: 2020-08-13 | End: 2020-08-15

## 2020-08-13 RX ORDER — CHLORHEXIDINE GLUCONATE 213 G/1000ML
15 SOLUTION TOPICAL EVERY 12 HOURS
Refills: 0 | Status: DISCONTINUED | OUTPATIENT
Start: 2020-08-13 | End: 2020-08-14

## 2020-08-13 RX ORDER — SODIUM CHLORIDE 9 MG/ML
500 INJECTION INTRAMUSCULAR; INTRAVENOUS; SUBCUTANEOUS ONCE
Refills: 0 | Status: COMPLETED | OUTPATIENT
Start: 2020-08-13 | End: 2020-08-13

## 2020-08-13 RX ORDER — INSULIN HUMAN 100 [IU]/ML
10 INJECTION, SOLUTION SUBCUTANEOUS ONCE
Refills: 0 | Status: COMPLETED | OUTPATIENT
Start: 2020-08-13 | End: 2020-08-13

## 2020-08-13 RX ORDER — MEPERIDINE HYDROCHLORIDE 50 MG/ML
25 INJECTION INTRAMUSCULAR; INTRAVENOUS; SUBCUTANEOUS ONCE
Refills: 0 | Status: DISCONTINUED | OUTPATIENT
Start: 2020-08-13 | End: 2020-08-13

## 2020-08-13 RX ORDER — DEXTROSE 50 % IN WATER 50 %
25 SYRINGE (ML) INTRAVENOUS
Refills: 0 | Status: DISCONTINUED | OUTPATIENT
Start: 2020-08-13 | End: 2020-08-14

## 2020-08-13 RX ORDER — SODIUM BICARBONATE 1 MEQ/ML
50 SYRINGE (ML) INTRAVENOUS ONCE
Refills: 0 | Status: COMPLETED | OUTPATIENT
Start: 2020-08-13 | End: 2020-08-13

## 2020-08-13 RX ORDER — ALBUMIN HUMAN 25 %
250 VIAL (ML) INTRAVENOUS
Refills: 0 | Status: COMPLETED | OUTPATIENT
Start: 2020-08-13 | End: 2020-08-13

## 2020-08-13 RX ORDER — PANTOPRAZOLE SODIUM 20 MG/1
40 TABLET, DELAYED RELEASE ORAL DAILY
Refills: 0 | Status: DISCONTINUED | OUTPATIENT
Start: 2020-08-13 | End: 2020-08-14

## 2020-08-13 RX ORDER — CHLORHEXIDINE GLUCONATE 213 G/1000ML
1 SOLUTION TOPICAL
Refills: 0 | Status: DISCONTINUED | OUTPATIENT
Start: 2020-08-13 | End: 2020-08-19

## 2020-08-13 RX ADMIN — PROPOFOL 3.85 MICROGRAM(S)/KG/MIN: 10 INJECTION, EMULSION INTRAVENOUS at 21:40

## 2020-08-13 RX ADMIN — HYDROMORPHONE HYDROCHLORIDE 0.5 MILLIGRAM(S): 2 INJECTION INTRAMUSCULAR; INTRAVENOUS; SUBCUTANEOUS at 22:04

## 2020-08-13 RX ADMIN — CHLORHEXIDINE GLUCONATE 30 MILLILITER(S): 213 SOLUTION TOPICAL at 05:58

## 2020-08-13 RX ADMIN — Medication 125 MILLILITER(S): at 14:27

## 2020-08-13 RX ADMIN — Medication 125 MILLILITER(S): at 13:25

## 2020-08-13 RX ADMIN — Medication 50 MILLIEQUIVALENT(S): at 19:14

## 2020-08-13 RX ADMIN — Medication 100 MILLIGRAM(S): at 16:42

## 2020-08-13 RX ADMIN — INSULIN HUMAN 10 UNIT(S): 100 INJECTION, SOLUTION SUBCUTANEOUS at 19:13

## 2020-08-13 RX ADMIN — FENTANYL CITRATE 50 MICROGRAM(S): 50 INJECTION INTRAVENOUS at 17:28

## 2020-08-13 RX ADMIN — SODIUM CHLORIDE 3000 MILLILITER(S): 9 INJECTION INTRAMUSCULAR; INTRAVENOUS; SUBCUTANEOUS at 13:34

## 2020-08-13 RX ADMIN — Medication 40 MILLIGRAM(S): at 05:17

## 2020-08-13 RX ADMIN — HYDROMORPHONE HYDROCHLORIDE 0.5 MILLIGRAM(S): 2 INJECTION INTRAMUSCULAR; INTRAVENOUS; SUBCUTANEOUS at 17:28

## 2020-08-13 RX ADMIN — Medication 6.02 MICROGRAM(S)/KG/MIN: at 13:29

## 2020-08-13 RX ADMIN — HYDROMORPHONE HYDROCHLORIDE 0.5 MILLIGRAM(S): 2 INJECTION INTRAMUSCULAR; INTRAVENOUS; SUBCUTANEOUS at 17:13

## 2020-08-13 RX ADMIN — CARVEDILOL PHOSPHATE 12.5 MILLIGRAM(S): 80 CAPSULE, EXTENDED RELEASE ORAL at 05:20

## 2020-08-13 RX ADMIN — CHLORHEXIDINE GLUCONATE 5 MILLILITER(S): 213 SOLUTION TOPICAL at 14:42

## 2020-08-13 RX ADMIN — Medication 50 MICROGRAM(S): at 05:17

## 2020-08-13 RX ADMIN — LEVETIRACETAM 250 MILLIGRAM(S): 250 TABLET, FILM COATED ORAL at 05:17

## 2020-08-13 RX ADMIN — SODIUM CHLORIDE 10 MILLILITER(S): 9 INJECTION INTRAMUSCULAR; INTRAVENOUS; SUBCUTANEOUS at 13:25

## 2020-08-13 RX ADMIN — INSULIN HUMAN 3 UNIT(S)/HR: 100 INJECTION, SOLUTION SUBCUTANEOUS at 13:29

## 2020-08-13 RX ADMIN — Medication 300 MILLIGRAM(S): at 20:49

## 2020-08-13 RX ADMIN — AMLODIPINE BESYLATE 10 MILLIGRAM(S): 2.5 TABLET ORAL at 05:17

## 2020-08-13 RX ADMIN — Medication 125 MILLILITER(S): at 13:31

## 2020-08-13 RX ADMIN — FENTANYL CITRATE 50 MICROGRAM(S): 50 INJECTION INTRAVENOUS at 17:13

## 2020-08-13 RX ADMIN — Medication 50 MILLILITER(S): at 19:14

## 2020-08-13 RX ADMIN — CHLORHEXIDINE GLUCONATE 15 MILLILITER(S): 213 SOLUTION TOPICAL at 18:26

## 2020-08-13 RX ADMIN — AMINOCAPROIC ACID 250 GM/HR: 500 TABLET ORAL at 13:28

## 2020-08-13 RX ADMIN — HYDROMORPHONE HYDROCHLORIDE 0.5 MILLIGRAM(S): 2 INJECTION INTRAMUSCULAR; INTRAVENOUS; SUBCUTANEOUS at 22:19

## 2020-08-13 RX ADMIN — CHLORHEXIDINE GLUCONATE 5 MILLILITER(S): 213 SOLUTION TOPICAL at 22:04

## 2020-08-13 NOTE — BRIEF OPERATIVE NOTE - NSICDXBRIEFPREOP_GEN_ALL_CORE_FT
PRE-OP DIAGNOSIS:  Coronary artery disease involving left main coronary artery 13-Aug-2020 12:49:46  Maximus Goldstein

## 2020-08-13 NOTE — PROGRESS NOTE ADULT - SUBJECTIVE AND OBJECTIVE BOX
[facility] [place] [facility]   MD NINA MASON MD RUORU WONG, PA    TEL:  OFFICE: [phone]  DR HSU CELL: [phone]  RAS MORELOS CELL: [phone]  DR. MARQUEZ CELL: [phone]  DR. ALEX CELL: [phone]    FROM 5 PM - 7 AM PLEASE CALL ANSWERING SERVICE: [phone]    RENAL FOLLOW UP NOTE  --------------------------------------------------------------------------------  HPI:      Pt seen and examined at bedside in cTICU  Pt intubated    PAST HISTORY  --------------------------------------------------------------------------------  No significant changes to PMH, PSH, FHx, SHx, unless otherwise noted    ALLERGIES & MEDICATIONS  --------------------------------------------------------------------------------  Allergies    No Known Allergies    Intolerances      Standing Inpatient Medications  albumin human  5% IVPB 250 milliLiter(s) IV Intermittent every 10 minutes  aminocaproic acid Infusion 5 Gm/Hr IV Continuous <Continuous>  aspirin Suppository 300 milliGRAM(s) Rectal once  cefuroxime  IVPB 1500 milliGRAM(s) IV Intermittent every 12 hours  chlorhexidine 0.12% Liquid 15 milliLiter(s) Oral Mucosa every 12 hours  chlorhexidine 0.12% Liquid 5 milliLiter(s) Oral Mucosa every 4 hours  chlorhexidine 2% Cloths 1 Application(s) Topical <User Schedule>  dextrose 50% Injectable 50 milliLiter(s) IV Push every 15 minutes  dextrose 50% Injectable 25 milliLiter(s) IV Push every 15 minutes  insulin regular Infusion 3 Unit(s)/Hr IV Continuous <Continuous>  meperidine     Injectable 25 milliGRAM(s) IV Push once  norepinephrine Infusion 0.05 MICROgram(s)/kG/Min IV Continuous <Continuous>  pantoprazole  Injectable 40 milliGRAM(s) IV Push daily  polyethylene glycol 3350 17 Gram(s) Oral daily  potassium chloride  10 mEq/50 mL IVPB 10 milliEquivalent(s) IV Intermittent every 1 hour  potassium chloride  10 mEq/50 mL IVPB 10 milliEquivalent(s) IV Intermittent every 1 hour  potassium chloride  10 mEq/50 mL IVPB 10 milliEquivalent(s) IV Intermittent every 1 hour  sodium chloride 0.9% Bolus 500 milliLiter(s) IV Bolus once  sodium chloride 0.9%. 1000 milliLiter(s) IV Continuous <Continuous>    PRN Inpatient Medications      REVIEW OF SYSTEMS  --------------------------------------------------------------------------------  unable to obtain    VITALS/PHYSICAL EXAM  --------------------------------------------------------------------------------  T(C): 36.7 (08-13-20 @ 05:10), Max: 36.7 (08-12-20 @ 15:16)  HR: 55 (08-13-20 @ 12:52) (55 - 71)  BP: 126/64 (08-13-20 @ 05:17) (99/56 - 173/73)  RR: 16 (08-13-20 @ 05:17) (16 - 18)  SpO2: 100% (08-13-20 @ 12:52) (97% - 100%)  Wt(kg): --  Height (cm): 177.8 (08-13-20 @ 12:57)  Weight (kg): 64.2 (08-12-20 @ 19:04)  BMI (kg/m2): 20.3 (08-13-20 @ 12:57)  BSA (m2): 1.8 (08-13-20 @ 12:57)      08-12-20 @ 07:01  -  08-13-20 @ 07:00  --------------------------------------------------------  IN: 1757 mL / OUT: 2800 mL / NET: -1043 mL      Physical Exam:  	Gen: NAD  	HEENT: + ETT  	Pulm: mechanical breath sounds B/L  	CV: S1S2  	Abd: Soft, +BS  	Ext: No LE edema B/L                      Neuro: sedated  	Skin: Warm and Dry   	Vascular access: no perma cath          [facility]  LABS/STUDIES  --------------------------------------------------------------------------------              12.2   7.93  >-----------<  184      [08-12-20 @ 23:48]              39.1     135  |  94  |  68  ----------------------------<  157      [08-12-20 @ 03:43]  4.8   |  23  |  7.96        Ca     9.4     [08-12-20 @ 03:43]        PTT: 65.8       [08-12-20 @ 23:48]      Creatinine Trend:  SCr 7.96 [08-12 @ 03:43]  SCr 6.16 [08-11 @ 06:48]  SCr 9.46 [08-10 @ 00:33]  SCr 8.01 [08-09 @ 07:20]  SCr 5.26 [08-08 @ 05:26]        Iron 45, TIBC 145, %sat 31      [12-26-19 @ 23:24]  Ferritin 1369      [12-26-19 @ 23:24]  PTH -- (Ca 8.4)      [02-21-20 @ 08:54]   93  HbA1c 6.0      [02-21-20 @ 08:53]  TSH 1.68      [08-08-20 @ 00:47]  Lipid: chol 124, TG 59, HDL 64, LDL 47      [08-08-20 @ 00:47]    HCV 0.24, Nonreact      [08-09-20 @ 19:37]  HIV Nonreact      [08-09-20 @ 19:26]

## 2020-08-13 NOTE — BRIEF OPERATIVE NOTE - ASSISTANT(S)
Pasquale Johnson PGY5, WALDO Claros, Maximus Goldstein MS4 Pasquale Johnson PGY5, Junior Merlos, Maximus Goldstein MS4

## 2020-08-13 NOTE — BRIEF OPERATIVE NOTE - ADULT CT SURG CONDUIT HARVEST PERFORMED BY
Left saphenous vein graph harvested endoscopically and open by WALDO Hill Greater saphenous vein graph harvested endoscopically and open by WALDO Hill

## 2020-08-13 NOTE — BRIEF OPERATIVE NOTE - NSICDXBRIEFPOSTOP_GEN_ALL_CORE_FT
POST-OP DIAGNOSIS:  Coronary artery disease involving left main coronary artery 13-Aug-2020 12:49:53  Maximus Goldstein

## 2020-08-13 NOTE — PROGRESS NOTE ADULT - SUBJECTIVE AND OBJECTIVE BOX
MANJINDERARAMY  MRN-[id number]  Patient is a 64y old  Male who presents with a chief complaint of Preop TVD (12 Aug 2020 09:46)    HPI:  63 yo M w/ PMH HTN, CAD s/p LAD stent 2009, 2019, carotid stenosis, ESRD on HD (MWF Dr Cardenas, [place] [facility] [place]- on transplant list), DM (on IV insulin, controlled, managed by Jamie Corral), CVA c/b status epilepticus requiring intubation and PEG in 12/2019-went to rehab, peg was removed July 2020, dysphagia s/p PEG, atrial fibrillation (on eliquis last dose 8/5/2020), anemia and GI bleed (Feb 2020) who presents today for cardiac angiogram. Patient reports cardiac evaluation for renal transplant- pharm stress test completed on 7/31/2020, revealing abnormal study: there is medium sized, moderate to severe defects in mid to distal anterior, mid to distal anterolateral walls predominantly reversible consistent with infarction with sig.. zackery- infarct ischemia. LVEF 45%. Recommended for cardiac angiogram for further ischemic evaluation     Son [phone] (07 Aug 2020 06:50)    Surgery/Hospital course:  8/7 Cath  8/12 HD with 2L UF  8/13 CABG    Today:      ============================I/O===========================  I&O's Summary    12 Aug 2020 07:01  -  13 Aug 2020 07:00  --------------------------------------------------------  IN: 1757 mL / OUT: 2800 mL / NET: -1043 mL    13 Aug 2020 07:01  -  13 Aug 2020 19:04  --------------------------------------------------------  IN: 1903.7 mL / OUT: 405 mL / NET: 1498.7 mL    ============================ LABS =========================                        8.6    9.50  )-----------( 132      ( 13 Aug 2020 13:56 )             27.0     08-13    133<L>  |  96  |  56<H>  ----------------------------<  111<H>  4.2   |  22  |  5.95<H>    Ca    9.1      13 Aug 2020 13:56  Phos  4.5     08-13  Mg     1.9     08-13    TPro  5.3<L>  /  Alb  3.3  /  TBili  0.4  /  DBili  x   /  AST  15  /  ALT  12  /  AlkPhos  41  08-13    LIVER FUNCTIONS - ( 13 Aug 2020 13:56 )  Alb: 3.3 g/dL / Pro: 5.3 g/dL / ALK PHOS: 41 U/L / ALT: 12 U/L / AST: 15 U/L / GGT: x           PT/INR - ( 13 Aug 2020 13:56 )   PT: 15.5 sec;   INR: 1.32 ratio       PTT - ( 13 Aug 2020 13:56 )  PTT:68.7 sec  ABG - ( 13 Aug 2020 18:41 )  pH, Arterial: 7.32  pH, Blood: x     /  pCO2: 38    /  pO2: 155   / HCO3: 19    / Base Excess: -6.2  /  SaO2: 98        ======================Micro/Rad/Cardio=================  Culture: Reviewed   CXR: Reviewed  Echo: Reviewed  ======================================================  PAST MEDICAL & SURGICAL HISTORY:  Intubation of airway performed without difficulty: Dec 2019  CVA c/b status epilepticus requiring intubation and PEG in 12/2019-  PEG (percutaneous endoscopic gastrostomy) status: removed July 2020  Anemia  CVA (cerebral vascular accident): 12/13/19 with residual bilateral weakness  Hypothyroidism  CRI (Chronic Renal Insufficiency)  CAD (Coronary Artery Disease): with Stents in 06/2009 and 6/2019  Dyslipidemia  Diabetes  Hypertension  History of insertion of stent into coronary artery bypass graft: 2009 and 2019    ====================ASSESSMENT ==============  CVA  Coronary Artery Disease  Hypertension  Chronic Renal Insufficiency  Dyslipidemia  Diabetes  Hypothyroidism    Plan:  ====================== NEUROLOGY=====================  Monitor neurological status as per protocol. Addressed analgesic regimen to optimize function.    aspirin Suppository 300 milliGRAM(s) Rectal once  meperidine     Injectable 25 milliGRAM(s) IV Push once    ==================== RESPIRATORY======================  Respiratory status requiring mechanical ventilation, incentive spirometry, spontaneous breathing trials and pulse oximetry monitoring.     Mechanical Ventilation:  Mode: CPAP with PS  TV (machine): 294  FiO2: 40  PEEP: 5  PS: 5  MAP: 7  PIP: 10    ====================CARDIOVASCULAR==================  8/13 CABG today with Dr. Guillory.    norepinephrine Infusion IV Continuous <Continuous>    ===================HEMATOLOGIC/ONC ===================  History of anemia. Monitor hematocrit, hemoglobin, and platelets, transfuse prn.    ===================== RENAL =========================  Patient has end stage renal disease, currently on HD M,W,F via a perma cath. s/p HD on 8/12  with 2 L UF.  Continue monitoring urine output, I&Os, BUN/Creatinine, and replace lytes prn.      ==================== GASTROINTESTINAL===================  Feeds on hold due to today's surgical procedure. Activate when tolerated.      Continue Protonix for stress ulcer prophylaxis  pantoprazole  Injectable 40 milliGRAM(s) IV Push daily    Bowel regimen with Miralax,   polyethylene glycol 3350 17 Gram(s) Oral daily    =======================    ENDOCRINE  =====================  History of diabetes mellitus, glucose control with insulin drip while following serial glucose levels to help achieve and maintain euglycemia.    insulin regular  human recombinant. 10 Unit(s) IV Push once  insulin regular Infusion IV Continuous <Continuous>    ========================INFECTIOUS DISEASE================  Zinacef for post-operative prophylactic antibiotic coverage,   cefuroxime  IVPB 1500 milliGRAM(s) IV Intermittent every 12 hours          Patient requires continuous monitoring with bedside rhythm monitoring, central venous and arterial blood pressure monitoring, close monitoring of respiratory rate, breathing pattern, pulse ox monitoring, and intermittent blood gas analysis. Care plan discussed with ICU care team. Patient remained critical and at risk for life threatening decompensation.     I have spent 30 minutes providing acute care for this critically ill patient.     By signing my name below, I, Erica Welsh, attest that this documentation has been prepared under the direction and in the presence of Cathy Hager MD.  Electronically signed: Cristine Jose, 08-13-20 @ 19:01    I, Cathy Hager, personally performed the services described in this documentation. all medical record entries made by the kevinibharvey were at my direction and in my presence. I have reviewed the chart and agree that the record reflects my personal performance and is accurate and complete  Electronically signed: Cathy Hager MD. MANJINDERPRABHAKARCHAD  MRN-[id number]  Patient is a 64y old  Male who presents with a chief complaint of Preop TVD (12 Aug 2020 09:46)    HPI:  63 yo M w/ PMH HTN, CAD s/p LAD stent 2009, 2019, carotid stenosis, ESRD on HD (MWF Dr Cardenas, [place] [facility] [place]- on transplant list), DM (on IV insulin, controlled, managed by Jamie Corral), CVA c/b status epilepticus requiring intubation and PEG in 12/2019-went to rehab, peg was removed July 2020, dysphagia s/p PEG, atrial fibrillation (on eliquis last dose 8/5/2020), anemia and GI bleed (Feb 2020) who presents today for cardiac angiogram. Patient reports cardiac evaluation for renal transplant- pharm stress test completed on 7/31/2020, revealing abnormal study: there is medium sized, moderate to severe defects in mid to distal anterior, mid to distal anterolateral walls predominantly reversible consistent with infarction with sig.. zackery- infarct ischemia. LVEF 45%. Recommended for cardiac angiogram for further ischemic evaluation     Son [phone] (07 Aug 2020 06:50)    Surgery/Hospital course:  8/7 Cath  8/12 HD with 2L UF  8/13 CABG    ============================I/O===========================  I&O's Summary    12 Aug 2020 07:01  -  13 Aug 2020 07:00  --------------------------------------------------------  IN: 1757 mL / OUT: 2800 mL / NET: -1043 mL    13 Aug 2020 07:01  -  13 Aug 2020 19:04  --------------------------------------------------------  IN: 1903.7 mL / OUT: 405 mL / NET: 1498.7 mL    ============================ LABS =========================                        8.6    9.50  )-----------( 132      ( 13 Aug 2020 13:56 )             27.0     08-13    133<L>  |  96  |  56<H>  ----------------------------<  111<H>  4.2   |  22  |  5.95<H>    Ca    9.1      13 Aug 2020 13:56  Phos  4.5     08-13  Mg     1.9     08-13    TPro  5.3<L>  /  Alb  3.3  /  TBili  0.4  /  DBili  x   /  AST  15  /  ALT  12  /  AlkPhos  41  08-13    LIVER FUNCTIONS - ( 13 Aug 2020 13:56 )  Alb: 3.3 g/dL / Pro: 5.3 g/dL / ALK PHOS: 41 U/L / ALT: 12 U/L / AST: 15 U/L / GGT: x           PT/INR - ( 13 Aug 2020 13:56 )   PT: 15.5 sec;   INR: 1.32 ratio       PTT - ( 13 Aug 2020 13:56 )  PTT:68.7 sec  ABG - ( 13 Aug 2020 18:41 )  pH, Arterial: 7.32  pH, Blood: x     /  pCO2: 38    /  pO2: 155   / HCO3: 19    / Base Excess: -6.2  /  SaO2: 98        ======================Micro/Rad/Cardio=================  Culture: Reviewed   CXR: Reviewed  Echo: Reviewed  ======================================================  PAST MEDICAL & SURGICAL HISTORY:  Intubation of airway performed without difficulty: Dec 2019  CVA c/b status epilepticus requiring intubation and PEG in 12/2019-  PEG (percutaneous endoscopic gastrostomy) status: removed July 2020  Anemia  CVA (cerebral vascular accident): 12/13/19 with residual bilateral weakness  Hypothyroidism  CRI (Chronic Renal Insufficiency)  CAD (Coronary Artery Disease): with Stents in 06/2009 and 6/2019  Dyslipidemia  Diabetes  Hypertension  History of insertion of stent into coronary artery bypass graft: 2009 and 2019    ====================ASSESSMENT ==============  CVA  Coronary Artery Disease  Hypertension  Chronic Renal Insufficiency  Dyslipidemia  Diabetes  Hypothyroidism    Plan:  ====================== NEUROLOGY=====================  Patient awoke from anesthesia and followed commands, sedated now on Diprivan until after dialysis. Addressed analgesic regimen to optimize function.    ==================== RESPIRATORY======================  Respiratory status requiring mechanical ventilation with close monitoring of respiratory rate, breathing pattern, continuous pulse oximetry monitoring and intermittent blood gas analysis.  Initially assessed on pressure support, but returned to assist control while requiring hemodialysis for hyperkalemia.    Mode: AC/ CMV (Assist Control/ Continuous Mandatory Ventilation)  RR (machine): 12  TV (machine): 500  FiO2: 40  PEEP: 5  ITime: 1  MAP: 10  PIP: 22    ====================CARDIOVASCULAR==================  Recovering s/p CABG, post operative vasogenic shock requiring a norepinephrine infusion. Cautious volume resuscitation ordered and Norepinephrine titrated to off.    ===================HEMATOLOGIC/ONC ===================  Acute blood loss anemia. No current indication for transfusion.    ===================== RENAL =========================  Patient has end stage renal disease, currently on HD M,W,F. Patient has hyperkalemia and required initiation of hemodialysis tonight for clearance.    ==================== GASTROINTESTINAL===================  NPO for now, OGT to drainage.      Continue Protonix for stress ulcer prophylaxis  pantoprazole  Injectable 40 milliGRAM(s) IV Push daily    Bowel regimen with Miralax,   polyethylene glycol 3350 17 Gram(s) Oral daily    =======================    ENDOCRINE  =====================  History of type 2 diabetes mellitus, glucose control with insulin drip while following serial glucose levels to help achieve and maintain euglycemia.    ========================INFECTIOUS DISEASE================    Zinacef for post-operative prophylactic antibiotic coverage,   cefuroxime  IVPB 1500 milliGRAM(s) IV Intermittent every 12 hours    Patient requires continuous monitoring with bedside rhythm monitoring, central venous and arterial blood pressure monitoring, close monitoring of respiratory rate, breathing pattern, pulse ox monitoring, and intermittent blood gas analysis. Care plan discussed with ICU care team. Patient remained critical and at risk for life threatening decompensation.     I have spent 35 minutes providing acute care for this critically ill patient.     By signing my name below, I, Erica Welsh, attest that this documentation has been prepared under the direction and in the presence of Cathy Hager MD.  Electronically signed: William Jose, 08-13-20 @ 19:01    I, Cathy Hager, personally performed the services described in this documentation. All medical record entries made by the william were at my direction and in my presence. I have reviewed the chart and agree that the record reflects my personal performance and is accurate and complete  Electronically signed: Cathy Hager MD.

## 2020-08-13 NOTE — PROGRESS NOTE ADULT - ASSESSMENT
ESRD on HD ( MWF) via perma cath  PT from [facility]  s/p cath 8/7  s/p HD on 8/12  with 2 L UF  permacath removed on 8/12 prior to sx today  s/p CABG  8/13  Plan for HD tomor  Consent obtained from son at [phone]  MOnitor BMP    Anemia  Hb at goal for ESRD   Monitor at present    HTN  Bp fluctutaing   MOnitor BP  Low salt diet    CKD -BMD  Check PTH  Monitor serum calcium and PO4

## 2020-08-14 ENCOUNTER — APPOINTMENT (OUTPATIENT)
Dept: INFECTIOUS DISEASE | Facility: CLINIC | Age: 65
End: 2020-08-14

## 2020-08-14 DIAGNOSIS — Z95.1 PRESENCE OF AORTOCORONARY BYPASS GRAFT: ICD-10-CM

## 2020-08-14 LAB
ALBUMIN SERPL ELPH-MCNC: 3.8 G/DL — SIGNIFICANT CHANGE UP (ref 3.3–5)
ALP SERPL-CCNC: 42 U/L — SIGNIFICANT CHANGE UP (ref 40–120)
ALT FLD-CCNC: 13 U/L — SIGNIFICANT CHANGE UP (ref 10–45)
ANION GAP SERPL CALC-SCNC: 15 MMOL/L — SIGNIFICANT CHANGE UP (ref 5–17)
APTT BLD: 33.2 SEC — SIGNIFICANT CHANGE UP (ref 27.5–35.5)
AST SERPL-CCNC: 21 U/L — SIGNIFICANT CHANGE UP (ref 10–40)
BASOPHILS # BLD AUTO: 0.02 K/UL — SIGNIFICANT CHANGE UP (ref 0–0.2)
BASOPHILS NFR BLD AUTO: 0.3 % — SIGNIFICANT CHANGE UP (ref 0–2)
BILIRUB SERPL-MCNC: 0.5 MG/DL — SIGNIFICANT CHANGE UP (ref 0.2–1.2)
BUN SERPL-MCNC: 38 MG/DL — HIGH (ref 7–23)
CALCIUM SERPL-MCNC: 9.4 MG/DL — SIGNIFICANT CHANGE UP (ref 8.4–10.5)
CHLORIDE SERPL-SCNC: 97 MMOL/L — SIGNIFICANT CHANGE UP (ref 96–108)
CO2 SERPL-SCNC: 22 MMOL/L — SIGNIFICANT CHANGE UP (ref 22–31)
CREAT SERPL-MCNC: 4.8 MG/DL — HIGH (ref 0.5–1.3)
EOSINOPHIL # BLD AUTO: 0.07 K/UL — SIGNIFICANT CHANGE UP (ref 0–0.5)
EOSINOPHIL NFR BLD AUTO: 0.9 % — SIGNIFICANT CHANGE UP (ref 0–6)
GAS PNL BLDA: SIGNIFICANT CHANGE UP
GLUCOSE BLDC GLUCOMTR-MCNC: 102 MG/DL — HIGH (ref 70–99)
GLUCOSE BLDC GLUCOMTR-MCNC: 125 MG/DL — HIGH (ref 70–99)
GLUCOSE BLDC GLUCOMTR-MCNC: 150 MG/DL — HIGH (ref 70–99)
GLUCOSE BLDC GLUCOMTR-MCNC: 162 MG/DL — HIGH (ref 70–99)
GLUCOSE BLDC GLUCOMTR-MCNC: 186 MG/DL — HIGH (ref 70–99)
GLUCOSE BLDC GLUCOMTR-MCNC: 261 MG/DL — HIGH (ref 70–99)
GLUCOSE BLDC GLUCOMTR-MCNC: 71 MG/DL — SIGNIFICANT CHANGE UP (ref 70–99)
GLUCOSE BLDC GLUCOMTR-MCNC: 72 MG/DL — SIGNIFICANT CHANGE UP (ref 70–99)
GLUCOSE BLDC GLUCOMTR-MCNC: 77 MG/DL — SIGNIFICANT CHANGE UP (ref 70–99)
GLUCOSE BLDC GLUCOMTR-MCNC: 79 MG/DL — SIGNIFICANT CHANGE UP (ref 70–99)
GLUCOSE SERPL-MCNC: 92 MG/DL — SIGNIFICANT CHANGE UP (ref 70–99)
HCT VFR BLD CALC: 27.7 % — LOW (ref 39–50)
HGB BLD-MCNC: 8.8 G/DL — LOW (ref 13–17)
IMM GRANULOCYTES NFR BLD AUTO: 0.4 % — SIGNIFICANT CHANGE UP (ref 0–1.5)
INR BLD: 1.16 RATIO — SIGNIFICANT CHANGE UP (ref 0.88–1.16)
LYMPHOCYTES # BLD AUTO: 1.07 K/UL — SIGNIFICANT CHANGE UP (ref 1–3.3)
LYMPHOCYTES # BLD AUTO: 14 % — SIGNIFICANT CHANGE UP (ref 13–44)
MAGNESIUM SERPL-MCNC: 1.7 MG/DL — SIGNIFICANT CHANGE UP (ref 1.6–2.6)
MCHC RBC-ENTMCNC: 28 PG — SIGNIFICANT CHANGE UP (ref 27–34)
MCHC RBC-ENTMCNC: 31.8 GM/DL — LOW (ref 32–36)
MCV RBC AUTO: 88.2 FL — SIGNIFICANT CHANGE UP (ref 80–100)
MONOCYTES # BLD AUTO: 0.82 K/UL — SIGNIFICANT CHANGE UP (ref 0–0.9)
MONOCYTES NFR BLD AUTO: 10.7 % — SIGNIFICANT CHANGE UP (ref 2–14)
NEUTROPHILS # BLD AUTO: 5.66 K/UL — SIGNIFICANT CHANGE UP (ref 1.8–7.4)
NEUTROPHILS NFR BLD AUTO: 73.7 % — SIGNIFICANT CHANGE UP (ref 43–77)
NRBC # BLD: 0 /100 WBCS — SIGNIFICANT CHANGE UP (ref 0–0)
PHOSPHATE SERPL-MCNC: 4.5 MG/DL — SIGNIFICANT CHANGE UP (ref 2.5–4.5)
PLATELET # BLD AUTO: 136 K/UL — LOW (ref 150–400)
POTASSIUM SERPL-MCNC: 4.7 MMOL/L — SIGNIFICANT CHANGE UP (ref 3.5–5.3)
POTASSIUM SERPL-SCNC: 4.7 MMOL/L — SIGNIFICANT CHANGE UP (ref 3.5–5.3)
PROT SERPL-MCNC: 5.8 G/DL — LOW (ref 6–8.3)
PROTHROM AB SERPL-ACNC: 13.7 SEC — HIGH (ref 10.6–13.6)
RBC # BLD: 3.14 M/UL — LOW (ref 4.2–5.8)
RBC # FLD: 17.9 % — HIGH (ref 10.3–14.5)
SODIUM SERPL-SCNC: 134 MMOL/L — LOW (ref 135–145)
WBC # BLD: 7.67 K/UL — SIGNIFICANT CHANGE UP (ref 3.8–10.5)
WBC # FLD AUTO: 7.67 K/UL — SIGNIFICANT CHANGE UP (ref 3.8–10.5)

## 2020-08-14 PROCEDURE — 99232 SBSQ HOSP IP/OBS MODERATE 35: CPT

## 2020-08-14 PROCEDURE — 99291 CRITICAL CARE FIRST HOUR: CPT | Mod: 24

## 2020-08-14 PROCEDURE — 93010 ELECTROCARDIOGRAM REPORT: CPT

## 2020-08-14 PROCEDURE — 71045 X-RAY EXAM CHEST 1 VIEW: CPT | Mod: 26

## 2020-08-14 RX ORDER — ACETAMINOPHEN 500 MG
1000 TABLET ORAL ONCE
Refills: 0 | Status: COMPLETED | OUTPATIENT
Start: 2020-08-14 | End: 2020-08-14

## 2020-08-14 RX ORDER — DEXTROSE 50 % IN WATER 50 %
12.5 SYRINGE (ML) INTRAVENOUS ONCE
Refills: 0 | Status: DISCONTINUED | OUTPATIENT
Start: 2020-08-14 | End: 2020-08-19

## 2020-08-14 RX ORDER — HYDROMORPHONE HYDROCHLORIDE 2 MG/ML
0.5 INJECTION INTRAMUSCULAR; INTRAVENOUS; SUBCUTANEOUS ONCE
Refills: 0 | Status: DISCONTINUED | OUTPATIENT
Start: 2020-08-14 | End: 2020-08-14

## 2020-08-14 RX ORDER — ASPIRIN/CALCIUM CARB/MAGNESIUM 324 MG
81 TABLET ORAL DAILY
Refills: 0 | Status: DISCONTINUED | OUTPATIENT
Start: 2020-08-14 | End: 2020-08-19

## 2020-08-14 RX ORDER — METOPROLOL TARTRATE 50 MG
25 TABLET ORAL
Refills: 0 | Status: DISCONTINUED | OUTPATIENT
Start: 2020-08-14 | End: 2020-08-14

## 2020-08-14 RX ORDER — SODIUM CHLORIDE 9 MG/ML
1000 INJECTION, SOLUTION INTRAVENOUS
Refills: 0 | Status: DISCONTINUED | OUTPATIENT
Start: 2020-08-14 | End: 2020-08-19

## 2020-08-14 RX ORDER — ATORVASTATIN CALCIUM 80 MG/1
80 TABLET, FILM COATED ORAL AT BEDTIME
Refills: 0 | Status: DISCONTINUED | OUTPATIENT
Start: 2020-08-14 | End: 2020-08-19

## 2020-08-14 RX ORDER — LEVOTHYROXINE SODIUM 125 MCG
25 TABLET ORAL AT BEDTIME
Refills: 0 | Status: DISCONTINUED | OUTPATIENT
Start: 2020-08-14 | End: 2020-08-14

## 2020-08-14 RX ORDER — MAGNESIUM SULFATE 500 MG/ML
2 VIAL (ML) INJECTION ONCE
Refills: 0 | Status: COMPLETED | OUTPATIENT
Start: 2020-08-14 | End: 2020-08-14

## 2020-08-14 RX ORDER — APIXABAN 2.5 MG/1
5 TABLET, FILM COATED ORAL EVERY 12 HOURS
Refills: 0 | Status: DISCONTINUED | OUTPATIENT
Start: 2020-08-14 | End: 2020-08-15

## 2020-08-14 RX ORDER — INSULIN GLARGINE 100 [IU]/ML
10 INJECTION, SOLUTION SUBCUTANEOUS AT BEDTIME
Refills: 0 | Status: DISCONTINUED | OUTPATIENT
Start: 2020-08-14 | End: 2020-08-15

## 2020-08-14 RX ORDER — IVERMECTIN 3 MG/1
12 TABLET ORAL DAILY
Refills: 0 | Status: COMPLETED | OUTPATIENT
Start: 2020-08-14 | End: 2020-08-15

## 2020-08-14 RX ORDER — DEXTROSE 50 % IN WATER 50 %
25 SYRINGE (ML) INTRAVENOUS ONCE
Refills: 0 | Status: DISCONTINUED | OUTPATIENT
Start: 2020-08-14 | End: 2020-08-19

## 2020-08-14 RX ORDER — METOPROLOL TARTRATE 50 MG
50 TABLET ORAL EVERY 8 HOURS
Refills: 0 | Status: DISCONTINUED | OUTPATIENT
Start: 2020-08-14 | End: 2020-08-19

## 2020-08-14 RX ORDER — INSULIN LISPRO 100/ML
VIAL (ML) SUBCUTANEOUS
Refills: 0 | Status: DISCONTINUED | OUTPATIENT
Start: 2020-08-14 | End: 2020-08-15

## 2020-08-14 RX ORDER — LEVOTHYROXINE SODIUM 125 MCG
50 TABLET ORAL DAILY
Refills: 0 | Status: DISCONTINUED | OUTPATIENT
Start: 2020-08-14 | End: 2020-08-16

## 2020-08-14 RX ORDER — HEPARIN SODIUM 5000 [USP'U]/ML
5000 INJECTION INTRAVENOUS; SUBCUTANEOUS EVERY 8 HOURS
Refills: 0 | Status: DISCONTINUED | OUTPATIENT
Start: 2020-08-14 | End: 2020-08-14

## 2020-08-14 RX ORDER — ACETAMINOPHEN 500 MG
650 TABLET ORAL EVERY 6 HOURS
Refills: 0 | Status: DISCONTINUED | OUTPATIENT
Start: 2020-08-14 | End: 2020-08-19

## 2020-08-14 RX ORDER — DEXTROSE 50 % IN WATER 50 %
15 SYRINGE (ML) INTRAVENOUS ONCE
Refills: 0 | Status: DISCONTINUED | OUTPATIENT
Start: 2020-08-14 | End: 2020-08-19

## 2020-08-14 RX ORDER — GLUCAGON INJECTION, SOLUTION 0.5 MG/.1ML
1 INJECTION, SOLUTION SUBCUTANEOUS ONCE
Refills: 0 | Status: DISCONTINUED | OUTPATIENT
Start: 2020-08-14 | End: 2020-08-19

## 2020-08-14 RX ORDER — OXYCODONE HYDROCHLORIDE 5 MG/1
5 TABLET ORAL EVERY 4 HOURS
Refills: 0 | Status: DISCONTINUED | OUTPATIENT
Start: 2020-08-14 | End: 2020-08-19

## 2020-08-14 RX ORDER — PANTOPRAZOLE SODIUM 20 MG/1
40 TABLET, DELAYED RELEASE ORAL
Refills: 0 | Status: DISCONTINUED | OUTPATIENT
Start: 2020-08-14 | End: 2020-08-19

## 2020-08-14 RX ADMIN — Medication 50 GRAM(S): at 03:50

## 2020-08-14 RX ADMIN — HYDROMORPHONE HYDROCHLORIDE 0.5 MILLIGRAM(S): 2 INJECTION INTRAMUSCULAR; INTRAVENOUS; SUBCUTANEOUS at 02:05

## 2020-08-14 RX ADMIN — HYDROMORPHONE HYDROCHLORIDE 0.5 MILLIGRAM(S): 2 INJECTION INTRAMUSCULAR; INTRAVENOUS; SUBCUTANEOUS at 01:50

## 2020-08-14 RX ADMIN — INSULIN GLARGINE 10 UNIT(S): 100 INJECTION, SOLUTION SUBCUTANEOUS at 22:00

## 2020-08-14 RX ADMIN — OXYCODONE HYDROCHLORIDE 5 MILLIGRAM(S): 5 TABLET ORAL at 21:53

## 2020-08-14 RX ADMIN — ATORVASTATIN CALCIUM 80 MILLIGRAM(S): 80 TABLET, FILM COATED ORAL at 21:24

## 2020-08-14 RX ADMIN — Medication 100 MILLIGRAM(S): at 15:27

## 2020-08-14 RX ADMIN — Medication 100 MILLIGRAM(S): at 04:01

## 2020-08-14 RX ADMIN — Medication 25 MILLIGRAM(S): at 17:39

## 2020-08-14 RX ADMIN — Medication 25 MILLIGRAM(S): at 08:32

## 2020-08-14 RX ADMIN — OXYCODONE HYDROCHLORIDE 5 MILLIGRAM(S): 5 TABLET ORAL at 21:23

## 2020-08-14 RX ADMIN — Medication 650 MILLIGRAM(S): at 18:26

## 2020-08-14 RX ADMIN — Medication 400 MILLIGRAM(S): at 03:30

## 2020-08-14 RX ADMIN — OXYCODONE HYDROCHLORIDE 5 MILLIGRAM(S): 5 TABLET ORAL at 13:58

## 2020-08-14 RX ADMIN — Medication 650 MILLIGRAM(S): at 17:39

## 2020-08-14 RX ADMIN — HYDROMORPHONE HYDROCHLORIDE 0.5 MILLIGRAM(S): 2 INJECTION INTRAMUSCULAR; INTRAVENOUS; SUBCUTANEOUS at 08:18

## 2020-08-14 RX ADMIN — Medication 2: at 17:39

## 2020-08-14 RX ADMIN — CHLORHEXIDINE GLUCONATE 1 APPLICATION(S): 213 SOLUTION TOPICAL at 05:29

## 2020-08-14 RX ADMIN — APIXABAN 5 MILLIGRAM(S): 2.5 TABLET, FILM COATED ORAL at 17:39

## 2020-08-14 RX ADMIN — Medication 2: at 12:25

## 2020-08-14 RX ADMIN — IVERMECTIN 12 MILLIGRAM(S): 3 TABLET ORAL at 18:46

## 2020-08-14 RX ADMIN — Medication 50 MILLIGRAM(S): at 21:23

## 2020-08-14 RX ADMIN — OXYCODONE HYDROCHLORIDE 5 MILLIGRAM(S): 5 TABLET ORAL at 13:14

## 2020-08-14 RX ADMIN — Medication 1000 MILLIGRAM(S): at 03:45

## 2020-08-14 RX ADMIN — Medication 6: at 21:24

## 2020-08-14 RX ADMIN — HYDROMORPHONE HYDROCHLORIDE 0.5 MILLIGRAM(S): 2 INJECTION INTRAMUSCULAR; INTRAVENOUS; SUBCUTANEOUS at 08:33

## 2020-08-14 RX ADMIN — PANTOPRAZOLE SODIUM 40 MILLIGRAM(S): 20 TABLET, DELAYED RELEASE ORAL at 08:32

## 2020-08-14 NOTE — PROGRESS NOTE ADULT - SUBJECTIVE AND OBJECTIVE BOX
VITAL SIGNS    Telemetry:      Vital Signs Last 24 Hrs  T(C): 36.6 (20 @ 11:00), Max: 37.5 (20 @ 20:00)  T(F): 97.9 (20 @ 11:00), Max: 99.5 (20 @ 20:00)  HR: 72 (20 @ 11:00) (54 - 83)  BP: 127/64 (20 @ 11:00) (126/60 - 153/66)  RR: 20 (20 @ 11:00) (11 - 24)  SpO2: 100% (20 @ 11:00) (100% - 100%)                    @ 07:01  -   @ 07:00  --------------------------------------------------------  IN: 3018.4 mL / OUT: 1400 mL / NET: 1618.4 mL     @ 07:01  -   @ 11:27  --------------------------------------------------------  IN: 30 mL / OUT: 125 mL / NET: -95 mL          Daily     Daily Weight in k.1 (14 Aug 2020 08:08)            CAPILLARY BLOOD GLUCOSE      POCT Blood Glucose.: 125 mg/dL (14 Aug 2020 07:03)  POCT Blood Glucose.: 102 mg/dL (14 Aug 2020 03:28)  POCT Blood Glucose.: 79 mg/dL (14 Aug 2020 01:41)  POCT Blood Glucose.: 77 mg/dL (14 Aug 2020 00:56)  POCT Blood Glucose.: 72 mg/dL (14 Aug 2020 00:27)  POCT Blood Glucose.: 71 mg/dL (13 Aug 2020 23:54)  POCT Blood Glucose.: 72 mg/dL (13 Aug 2020 23:36)  POCT Blood Glucose.: 84 mg/dL (13 Aug 2020 22:53)  POCT Blood Glucose.: 125 mg/dL (13 Aug 2020 22:01)  POCT Blood Glucose.: 173 mg/dL (13 Aug 2020 20:59)  POCT Blood Glucose.: 98 mg/dL (13 Aug 2020 17:54)  POCT Blood Glucose.: 79 mg/dL (13 Aug 2020 16:48)  POCT Blood Glucose.: 87 mg/dL (13 Aug 2020 16:20)  POCT Blood Glucose.: 119 mg/dL (13 Aug 2020 13:56)            Drains:     MS         [  ] Drainage:                 L Pleural  [  ]  Drainage:                R Pleural  [  ]  Drainage:    Pacing Wires        [  ]   Settings:                                  Isolated  [  ]    Coumadin    [ ] YES          [  ]      NO                                   PHYSICAL EXAM        Neurology: alert and oriented x 3, nonfocal, no gross deficits  CV : s1 s2 RRR  Sternal Wound :  CDI , Stable  Lungs: cta  Abdomen: soft, nontender, nondistended, positive bowel sounds, last bowel movement                       chest tubes  :    voiding / tucker - sbd         Extremities:      edema   /  -   calve tenderness ,    L leg  /  R leg  incisions cdi          apixaban 5 milliGRAM(s) Oral every 12 hours  aspirin enteric coated 81 milliGRAM(s) Oral daily  atorvastatin 80 milliGRAM(s) Oral at bedtime  cefuroxime  IVPB 1500 milliGRAM(s) IV Intermittent every 12 hours  chlorhexidine 2% Cloths 1 Application(s) Topical <User Schedule>  dextrose 40% Gel 15 Gram(s) Oral once PRN  dextrose 5%. 1000 milliLiter(s) IV Continuous <Continuous>  dextrose 50% Injectable 12.5 Gram(s) IV Push once  dextrose 50% Injectable 25 Gram(s) IV Push once  dextrose 50% Injectable 25 Gram(s) IV Push once  glucagon  Injectable 1 milliGRAM(s) IntraMuscular once PRN  insulin lispro (HumaLOG) corrective regimen sliding scale   SubCutaneous Before meals and at bedtime  levothyroxine 50 MICROGram(s) Oral daily  metoprolol tartrate 25 milliGRAM(s) Oral two times a day  pantoprazole    Tablet 40 milliGRAM(s) Oral before breakfast  polyethylene glycol 3350 17 Gram(s) Oral daily  sodium chloride 0.9%. 1000 milliLiter(s) IV Continuous <Continuous>                    Physical Therapy Rec:   Home  [  ]   Home w/ PT  [  ]  Rehab  [  ]  Discussed with Cardiothoracic Team at AM rounds. im ok    VITAL SIGNS    Telemetry:  nsr 74    Vital Signs Last 24 Hrs  T(C): 36.6 (20 @ 11:00), Max: 37.5 (20 @ 20:00)  T(F): 97.9 (20 @ 11:00), Max: 99.5 (20 @ 20:00)  HR: 72 (20 @ 11:00) (54 - 83)  BP: 127/64 (20 @ 11:00) (126/60 - 153/66)  RR: 20 (20 @ 11:00) (11 - 24)  SpO2: 100% (20 @ 11:00) (100% - 100%)                    @ 07:01  -   @ 07:00  --------------------------------------------------------  IN: 3018.4 mL / OUT: 1400 mL / NET: 1618.4 mL     @ 07:01  -   @ 11:27  --------------------------------------------------------  IN: 30 mL / OUT: 125 mL / NET: -95 mL          Daily     Daily Weight in k.1 (14 Aug 2020 08:08)            CAPILLARY BLOOD GLUCOSE      POCT Blood Glucose.: 125 mg/dL (14 Aug 2020 07:03)  POCT Blood Glucose.: 102 mg/dL (14 Aug 2020 03:28)  POCT Blood Glucose.: 79 mg/dL (14 Aug 2020 01:41)  POCT Blood Glucose.: 77 mg/dL (14 Aug 2020 00:56)  POCT Blood Glucose.: 72 mg/dL (14 Aug 2020 00:27)  POCT Blood Glucose.: 71 mg/dL (13 Aug 2020 23:54)  POCT Blood Glucose.: 72 mg/dL (13 Aug 2020 23:36)  POCT Blood Glucose.: 84 mg/dL (13 Aug 2020 22:53)  POCT Blood Glucose.: 125 mg/dL (13 Aug 2020 22:01)  POCT Blood Glucose.: 173 mg/dL (13 Aug 2020 20:59)  POCT Blood Glucose.: 98 mg/dL (13 Aug 2020 17:54)  POCT Blood Glucose.: 79 mg/dL (13 Aug 2020 16:48)  POCT Blood Glucose.: 87 mg/dL (13 Aug 2020 16:20)  POCT Blood Glucose.: 119 mg/dL (13 Aug 2020 13:56)            Drains:         Pacing Wires        [  x]   Settings:      vvi                            Isolated  [  ]    Coumadin    [ ] YES          [ x ]      NO                                   PHYSICAL EXAM        Neurology: alert and oriented x 3, nonfocal, no gross deficits  CV : s1 s2 RRR  R ij cdi  R rad mustapha  Sternal Wound :  CDI , Stable  Lungs: cta  Abdomen: soft, nontender, nondistended, positive bowel sounds, last bowel movement preop.  pw - pacer                      :     tucker - sbd      Extremities:     trace  edema   /  -   calve tenderness ,    L leg  incisions cdi  R groin hd catheter cdi.          apixaban 5 milliGRAM(s) Oral every 12 hours  aspirin enteric coated 81 milliGRAM(s) Oral daily  atorvastatin 80 milliGRAM(s) Oral at bedtime  cefuroxime  IVPB 1500 milliGRAM(s) IV Intermittent every 12 hours  chlorhexidine 2% Cloths 1 Application(s) Topical <User Schedule>  dextrose 40% Gel 15 Gram(s) Oral once PRN  dextrose 5%. 1000 milliLiter(s) IV Continuous <Continuous>  dextrose 50% Injectable 12.5 Gram(s) IV Push once  dextrose 50% Injectable 25 Gram(s) IV Push once  dextrose 50% Injectable 25 Gram(s) IV Push once  glucagon  Injectable 1 milliGRAM(s) IntraMuscular once PRN  insulin lispro (HumaLOG) corrective regimen sliding scale   SubCutaneous Before meals and at bedtime  levothyroxine 50 MICROGram(s) Oral daily  metoprolol tartrate 25 milliGRAM(s) Oral two times a day  pantoprazole    Tablet 40 milliGRAM(s) Oral before breakfast  polyethylene glycol 3350 17 Gram(s) Oral daily  sodium chloride 0.9%. 1000 milliLiter(s) IV Continuous <Continuous>                    Physical Therapy Rec:   Home  [  ]   Home w/ PT  [  ]  Rehab  [  ]  Discussed with Cardiothoracic Team at AM rounds.

## 2020-08-14 NOTE — PROGRESS NOTE ADULT - PROBLEM SELECTOR PLAN 1
s/p Cath -TVD Plan for CABG with Dr. Guillory OR tomorrow 8/13/20  Preop workup  Continue coreg12.5bid   continue Lasix 40mg daily   continue Norvasc 10   Continue on Hep gtt 10U   ck PTT Asa, Statin, B-blocker, Chest PT,  Incentive spirometry, wound care and assessment.  Ambulate

## 2020-08-14 NOTE — PROGRESS NOTE ADULT - SUBJECTIVE AND OBJECTIVE BOX
Follow Up:  positive quantiferon    Interval History: s/p CABG yesterday. denies pain. afebrile     REVIEW OF SYSTEMS  [  ] ROS unobtainable because:    [x  ] All other systems negative except as noted below    Constitutional:  [ ] fever [ ] chills  [ ] weight loss  [ ] weakness  Skin:  [ ] rash [ ] phlebitis	  Eyes: [ ] icterus [ ] pain  [ ] discharge	  ENMT: [ ] sore throat  [ ] thrush [ ] ulcers [ ] exudates  Respiratory: [ ] dyspnea [ ] hemoptysis [ ] cough [ ] sputum	  Cardiovascular:  [ ] chest pain [ ] palpitations [ ] edema	  Gastrointestinal:  [ ] nausea [ ] vomiting [ ] diarrhea [ ] constipation [ ] pain	  Genitourinary:  [ ] dysuria [ ] frequency [ ] hematuria [ ] discharge [ ] flank pain  [ ] incontinence  Musculoskeletal:  [ ] myalgias [ ] arthralgias [ ] arthritis  [ ] back pain  Neurological:  [ ] headache [ ] seizures  [ ] confusion/altered mental status    Allergies  No Known Allergies        ANTIMICROBIALS:  cefuroxime  IVPB 1500 every 12 hours      OTHER MEDS:  MEDICATIONS  (STANDING):  acetaminophen   Tablet .. 650 every 6 hours PRN  apixaban 5 every 12 hours  aspirin enteric coated 81 daily  atorvastatin 80 at bedtime  dextrose 40% Gel 15 once PRN  dextrose 50% Injectable 12.5 once  dextrose 50% Injectable 25 once  dextrose 50% Injectable 25 once  glucagon  Injectable 1 once PRN  insulin lispro (HumaLOG) corrective regimen sliding scale  Before meals and at bedtime  levothyroxine 50 daily  metoprolol tartrate 25 two times a day  oxyCODONE    IR 5 every 4 hours PRN  pantoprazole    Tablet 40 before breakfast  polyethylene glycol 3350 17 daily      Vital Signs Last 24 Hrs  T(C): 36.7 (14 Aug 2020 17:00), Max: 37.5 (13 Aug 2020 20:00)  T(F): 98.1 (14 Aug 2020 17:00), Max: 99.5 (13 Aug 2020 20:00)  HR: 120 (14 Aug 2020 17:00) (59 - 120)  BP: 127/64 (14 Aug 2020 11:00) (126/60 - 153/66)  BP(mean): 96 (14 Aug 2020 10:00) (87 - 98)  RR: 18 (14 Aug 2020 17:00) (11 - 24)  SpO2: 100% (14 Aug 2020 17:00) (100% - 100%)    PHYSICAL EXAMINATION:  General: Alert and Awake, NAD  HEENT: PERRL, EOMI  Neck: Supple  Cardiac: RRR, No M/R/G  Chest: Sternal surgical site dressing  Resp: CTAB, No Wh/Rh/Ra  Abdomen: NBS, NT/ND, No HSM, No rigidity or guarding  MSK: No LE edema. No Calf tenderness  Skin: No rashes or lesions. Skin is warm and dry to the touch.   Neuro: Alert and Awake. CN 2-12 Grossly intact. Moves all four extremities spontaneously.  Psych: Calm, Pleasant, Cooperative                          8.8    7.67  )-----------( 136      ( 14 Aug 2020 02:53 )             27.7       08-14    134<L>  |  97  |  38<H>  ----------------------------<  92  4.7   |  22  |  4.80<H>    Ca    9.4      14 Aug 2020 02:53  Phos  4.5     08-14  Mg     1.7     08-14    TPro  5.8<L>  /  Alb  3.8  /  TBili  0.5  /  DBili  x   /  AST  21  /  ALT  13  /  AlkPhos  42  08-14          MICROBIOLOGY:  v  .Sputum sputum conical  08-11-20   Culture is being performed.  --  --      .Sputum SPUTUM  08-10-20   Culture is being performed.  --  --      .Sputum Other  08-10-20   Culture is being performed.  --  --        Toxoplasma IgG Screen: <3.0 IU/mL (08-09-20 @ 19:37)          RADIOLOGY:    <The imaging below has been reviewed and visualized by me independently. Findings as detailed in report below>    EXAM:  XR CHEST PORTABLE URGENT 1V                        PROCEDURE DATE:  08/14/2020    Interval removal of ET tube, enteric tube, left chest tube, and mediastinal drainage.  Unchanged left pleural effusion.  No pneumothorax. Follow Up:  positive quantiferon, positive strongyloides antibody     Interval History: s/p CABG yesterday. denies pain. afebrile     REVIEW OF SYSTEMS  [  ] ROS unobtainable because:    [x  ] All other systems negative except as noted below    Constitutional:  [ ] fever [ ] chills  [ ] weight loss  [ ] weakness  Skin:  [ ] rash [ ] phlebitis	  Eyes: [ ] icterus [ ] pain  [ ] discharge	  ENMT: [ ] sore throat  [ ] thrush [ ] ulcers [ ] exudates  Respiratory: [ ] dyspnea [ ] hemoptysis [ ] cough [ ] sputum	  Cardiovascular:  [ ] chest pain [ ] palpitations [ ] edema	  Gastrointestinal:  [ ] nausea [ ] vomiting [ ] diarrhea [ ] constipation [ ] pain	  Genitourinary:  [ ] dysuria [ ] frequency [ ] hematuria [ ] discharge [ ] flank pain  [ ] incontinence  Musculoskeletal:  [ ] myalgias [ ] arthralgias [ ] arthritis  [ ] back pain  Neurological:  [ ] headache [ ] seizures  [ ] confusion/altered mental status    Allergies  No Known Allergies        ANTIMICROBIALS:  cefuroxime  IVPB 1500 every 12 hours      OTHER MEDS:  MEDICATIONS  (STANDING):  acetaminophen   Tablet .. 650 every 6 hours PRN  apixaban 5 every 12 hours  aspirin enteric coated 81 daily  atorvastatin 80 at bedtime  dextrose 40% Gel 15 once PRN  dextrose 50% Injectable 12.5 once  dextrose 50% Injectable 25 once  dextrose 50% Injectable 25 once  glucagon  Injectable 1 once PRN  insulin lispro (HumaLOG) corrective regimen sliding scale  Before meals and at bedtime  levothyroxine 50 daily  metoprolol tartrate 25 two times a day  oxyCODONE    IR 5 every 4 hours PRN  pantoprazole    Tablet 40 before breakfast  polyethylene glycol 3350 17 daily      Vital Signs Last 24 Hrs  T(C): 36.7 (14 Aug 2020 17:00), Max: 37.5 (13 Aug 2020 20:00)  T(F): 98.1 (14 Aug 2020 17:00), Max: 99.5 (13 Aug 2020 20:00)  HR: 120 (14 Aug 2020 17:00) (59 - 120)  BP: 127/64 (14 Aug 2020 11:00) (126/60 - 153/66)  BP(mean): 96 (14 Aug 2020 10:00) (87 - 98)  RR: 18 (14 Aug 2020 17:00) (11 - 24)  SpO2: 100% (14 Aug 2020 17:00) (100% - 100%)    PHYSICAL EXAMINATION:  General: Alert and Awake, NAD  HEENT: PERRL, EOMI  Neck: Supple  Cardiac: RRR, No M/R/G  Chest: Sternal surgical site dressing  Resp: CTAB, No Wh/Rh/Ra  Abdomen: NBS, NT/ND, No HSM, No rigidity or guarding  MSK: No LE edema. No Calf tenderness  Skin: No rashes or lesions. Skin is warm and dry to the touch.   Neuro: Alert and Awake. CN 2-12 Grossly intact. Moves all four extremities spontaneously.  Psych: Calm, Pleasant, Cooperative                          8.8    7.67  )-----------( 136      ( 14 Aug 2020 02:53 )             27.7       08-14    134<L>  |  97  |  38<H>  ----------------------------<  92  4.7   |  22  |  4.80<H>    Ca    9.4      14 Aug 2020 02:53  Phos  4.5     08-14  Mg     1.7     08-14    TPro  5.8<L>  /  Alb  3.8  /  TBili  0.5  /  DBili  x   /  AST  21  /  ALT  13  /  AlkPhos  42  08-14          MICROBIOLOGY:    Strongyloides Antibodies (08.10.20 @ 04:55)    Strongyloides Antibodies: Positive: IgG antibodies to Strongyloides were detected suggesting  current or past infection. False positive results may  occur with other roundworm infections (e.g., Trichinella,  Taenia solium).  Clinical correlation is required.  Test Performed by:  [facility]  [address] [place] [address]  : Mikey Cramer M.D. Ph.D.; CLIA# [id number]        .Sputum sputum conical  08-11-20   Culture is being performed.  --  --      .Sputum SPUTUM  08-10-20   Culture is being performed.  --  --      .Sputum Other  08-10-20   Culture is being performed.  --  --        Toxoplasma IgG Screen: <3.0 IU/mL (08-09-20 @ 19:37)          RADIOLOGY:    <The imaging below has been reviewed and visualized by me independently. Findings as detailed in report below>    EXAM:  XR CHEST PORTABLE URGENT 1V                        PROCEDURE DATE:  08/14/2020    Interval removal of ET tube, enteric tube, left chest tube, and mediastinal drainage.  Unchanged left pleural effusion.  No pneumothorax.

## 2020-08-14 NOTE — SWALLOW BEDSIDE ASSESSMENT ADULT - ADDITIONAL RECOMMENDATIONS
Maintain adequate oral hygiene.   This service will continue to follow Pt while in house. Plan for f/u on 8/15 as schedule permits.

## 2020-08-14 NOTE — DIETITIAN INITIAL EVALUATION ADULT. - PERTINENT LABORATORY DATA
08-14 @ 02:53: Sodium 134<L>, Potassium 4.7, Chloride 97, Calcium 9.4, Magnesium 1.7, Phosphorus 4.5, BUN 38<H>, Creatinine 4.80<H>, BG 92, Alk Phos 42, ALT/SGPT 13, AST/SGOT 21, HbA1c 9.3, Total Protein 5.8<L>, Albumin 3.8, Prealbumin --, Total Bilirubin 0.5, Direct Bilirubin --, Hemoglobin 8.8<L>, Hematocrit 27.7<L>  BG levels: 8/13: , 8/14:  08-14 @ 02:53: Sodium 134<L>, Potassium 4.7, Chloride 97, Calcium 9.4, Magnesium 1.7, Phosphorus 4.5, BUN 38<H>, Creatinine 4.80<H>, BG 92, Alk Phos 42, ALT/SGPT 13, AST/SGOT 21, HbA1c 6.6, Total Protein 5.8<L>, Albumin 3.8, Prealbumin --, Total Bilirubin 0.5, Direct Bilirubin --, Hemoglobin 8.8<L>, Hematocrit 27.7<L>

## 2020-08-14 NOTE — SWALLOW BEDSIDE ASSESSMENT ADULT - ASR SWALLOW RECOMMEND DIAG
Will continue to monitor Pt clinically to determine capacity for subjective diet advancement vs instrumental exam

## 2020-08-14 NOTE — DIETITIAN INITIAL EVALUATION ADULT. - SIGNS/SYMPTOMS
POD 2 from sternotomy. POD 1 from sternotomy. 12.5% Wt loss x6 months, Edema, ? swallowing difficulty. POD 1 from sternotomy and ESRD on HD

## 2020-08-14 NOTE — AIRWAY REMOVAL NOTE  ADULT & PEDS - ARTIFICAL AIRWAY REMOVAL COMMENTS
Written order for extubation verified. The patient was identified by full name and birth date compared to the identification band. Present during the procedure was TAO Swartz.

## 2020-08-14 NOTE — SWALLOW BEDSIDE ASSESSMENT ADULT - SWALLOW EVAL: DIAGNOSIS
Pt seen for clinical bedside swallow evaluation s/p CABG x3 with history of CVA and dysphagia (see dysphagia history in addendum). Today, Pt presents with 1) Oral and suspected pharyngeal dysphagia. Oral phase vs inadequate dentition results in reduced bolus formation. Suspected pharyngeal dysphagia characterized by multiple swallows per bolus and s/s of airway protection deficits with thin liquids. No overt, clinical s/s of aspiration/penetration with nectar thick liquids.

## 2020-08-14 NOTE — PHYSICAL THERAPY INITIAL EVALUATION ADULT - PERTINENT HX OF CURRENT PROBLEM, REHAB EVAL
64 y.o. 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 Now s/p CABG x3 8/13/20.

## 2020-08-14 NOTE — PHYSICAL THERAPY INITIAL EVALUATION ADULT - PLANNED THERAPY INTERVENTIONS, PT EVAL
LTG 1: Stairs - Pt will be independent with negotiation of 5 steps w/ unilateral handrail within 4 weeks./balance training/gait training/transfer training/bed mobility training

## 2020-08-14 NOTE — SWALLOW BEDSIDE ASSESSMENT ADULT - SLP GENERAL OBSERVATIONS
Pt encountered awake and alert, reclined in bed on 3l/min via NC. Son at bedside. Pt speaks English, denied [place] . A&Ox4. Vocal quality minimally wet at baseline, cleared with cued throat clear. Able to follow directives. Speech intelligibly slightly reduced, which Pt son reports is due to recent sx. Intelligibility improved when cued to reduce rate of speech. Per Pt son, Pt was started on PO food/soft textures in February 2020. PEG was removed in June (Due to COVID was unable to get earlier apt). Pt has been home eating and drinking regular textures and thin liquids. Denied pulmonary complications, GERD, and odynophagia.

## 2020-08-14 NOTE — PROGRESS NOTE ADULT - ASSESSMENT
64 M ESRD on HD MWF undergoing transplant work up, CAD s/p stents, IDDMII, hospital course in December 2019 surrounding CVA c/b status epilepticus requiring intubation and PEG placement s/p removal presented for cardiac angiogram as part of pre-transplant work up, reports of outpatient new Quantiferon positive.    [facility]patient [facility] Quantiferon Positive  Inpatient [facility] Quantiferon Negative  CT Chest per radiology report with stable opacities when compared to 1/2020  In any case in this patient planned for renal transplant so, I would rule out for MTB  Suspicion is relatively low for active disease given no drastic changes in radiology and symptoms    RECOMMENDATIONS:    #Positive Quantiferon, Abnormal Chest CT  --3x smears negative - isolation discontinued   --Recommend outpatient vs. inpatient pulmonary evaluation - patient planned for renal transplant. In house quantiferon is negative however, false positive Quantiferons are rare (false negatives are more common). My thought (while waiting for the AFB sputum cultures) was to have pulmonology evaluate him – he does have pulmonary opacities and is [place] incidence area [place] with a nephew that had active TB (although exposure to this family member was minimal).  --Followup on AFB sputum culture    #Pre-Renal Transplant  --ID clearance for renal transplant pending negative AFB sputum cultures (given positive quantiferon, positive PPD and lung opacities)  --Strongyloides antibody positive - Recommend Ivermectin     #Encounter to Vaccinate patient  --Appears to be UTD for pre-transplant vaccines aside for shingrix (which would have to be done as outpatient) 64 M ESRD on HD MWF undergoing transplant work up, CAD s/p stents, IDDMII, hospital course in December 2019 surrounding CVA c/b status epilepticus requiring intubation and PEG placement s/p removal presented for cardiac angiogram as part of pre-transplant work up, reports of outpatient new Quantiferon positive.    Outpatient Dialysis Center Quantiferon Positive  Inpatient [facility] Quantiferon Negative  CT Chest per radiology report with stable opacities when compared to 1/2020  In any case in this patient planned for renal transplant so, I would rule out for MTB  Suspicion is relatively low for active disease given no drastic changes in radiology and symptoms    RECOMMENDATIONS:    #Positive Quantiferon, Abnormal Chest CT  --3x smears negative - isolation discontinued   --Recommend outpatient vs. inpatient pulmonary evaluation - patient planned for renal transplant. In house quantiferon is negative however, false positive Quantiferons are rare (false negatives are more common). My thought (while waiting for the AFB sputum cultures) was to have pulmonology evaluate him – he does have pulmonary opacities and is [place] in [place] high incidence area [place] with a nephew that had active TB (although exposure to this family member was minimal).  --Followup on AFB sputum culture    #Pre-Renal Transplant, Positive Strongyloides Antibody  --ID clearance for renal transplant pending negative AFB sputum cultures (given positive quantiferon, positive PPD and lung opacities)  --Strongyloides antibody positive - Recommend Ivermectin 200 ug/kg/day (12 mg) PO today and tomorrow     #Encounter to Vaccinate patient  --Appears to be UTD for pre-transplant vaccines aside for shingrix (which would have to be done as outpatient)    I will be away over this upcoming weekend. Please contact the Infectious Diseases Office with any further questions or concerns.     Carlton Hoover M.D.  [facility] Division of Infectious Disease  8AM-5PM: Pager Number [phone]  After Hours (or if no response): Please contact the Infectious Diseases Office at [phone]     The above assessment and plan were discussed with EDUARD MUNOZ

## 2020-08-14 NOTE — PROGRESS NOTE ADULT - PROBLEM SELECTOR PROBLEM 1
Coronary artery disease involving native coronary artery of native heart with unstable angina pectoris S/P CABG x 3

## 2020-08-14 NOTE — PROGRESS NOTE ADULT - SUBJECTIVE AND OBJECTIVE BOX
[facility]   MD NINA MASON MD RUORU WONG, PA    TEL:  OFFICE: [phone]  DR HSU CELL: [phone]  RAS MORELOS CELL: [phone]  DR. MARQUEZ CELL: [phone]  DR. ALEX CELL: [phone]    FROM 5 PM - 7 AM PLEASE CALL ANSWERING SERVICE: [phone]    RENAL FOLLOW UP NOTE  --------------------------------------------------------------------------------  HPI:      [facility]    PAST HISTORY  --------------------------------------------------------------------------------  No significant changes to PMH, PSH, FHx, SHx, unless otherwise noted    ALLERGIES & MEDICATIONS  --------------------------------------------------------------------------------  Allergies    No Known Allergies    Intolerances      Standing Inpatient Medications  apixaban 5 milliGRAM(s) Oral every 12 hours  aspirin enteric coated 81 milliGRAM(s) Oral daily  atorvastatin 80 milliGRAM(s) Oral at bedtime  cefuroxime  IVPB 1500 milliGRAM(s) IV Intermittent every 12 hours  chlorhexidine 2% Cloths 1 Application(s) Topical <User Schedule>  dextrose 50% Injectable 50 milliLiter(s) IV Push every 15 minutes  dextrose 50% Injectable 25 milliLiter(s) IV Push every 15 minutes  heparin   Injectable 5000 Unit(s) SubCutaneous every 8 hours  insulin regular Infusion 3 Unit(s)/Hr IV Continuous <Continuous>  levothyroxine 50 MICROGram(s) Oral daily  metoprolol tartrate 25 milliGRAM(s) Oral two times a day  pantoprazole    Tablet 40 milliGRAM(s) Oral before breakfast  polyethylene glycol 3350 17 Gram(s) Oral daily  sodium chloride 0.9%. 1000 milliLiter(s) IV Continuous <Continuous>    PRN Inpatient Medications      REVIEW OF SYSTEMS  --------------------------------------------------------------------------------  General: no fever    MSK: no edema     VITALS/PHYSICAL EXAM  --------------------------------------------------------------------------------  T(C): 37.3 (08-14-20 @ 04:00), Max: 37.5 (08-13-20 @ 20:00)  HR: 74 (08-14-20 @ 07:00) (54 - 83)  BP: 129/62 (08-14-20 @ 07:00) (126/60 - 148/68)  RR: 24 (08-14-20 @ 07:00) (11 - 24)  SpO2: 100% (08-14-20 @ 07:00) (100% - 100%)  Wt(kg): --  Height (cm): 177.8 (08-13-20 @ 12:57)  Weight (kg): 64.2 (08-12-20 @ 19:04)  BMI (kg/m2): 20.3 (08-13-20 @ 12:57)  BSA (m2): 1.8 (08-13-20 @ 12:57)      08-13-20 @ 07:01 - 08-14-20 @ 07:00  --------------------------------------------------------  IN: 3018.4 mL / OUT: 1400 mL / NET: 1618.4 mL      Physical Exam:  	Gen: NAD  	HEENT: MMM  	Pulm: CTA B/L  	CV: S1S2  	Abd: Soft, +BS  	Ext: No LE edema B/L                      Neuro: Awake alert  	Skin: Warm and Dry   	Vascular access: myron tucker  LABS/STUDIES  --------------------------------------------------------------------------------              8.8    7.67  >-----------<  136      [08-14-20 @ 02:53]              27.7     134  |  97  |  38  ----------------------------<  92      [08-14-20 @ 02:53]  4.7   |  22  |  4.80        Ca     9.4     [08-14-20 @ 02:53]      Mg     1.7     [08-14-20 @ 02:53]      Phos  4.5     [08-14-20 @ 02:53]    TPro  5.8  /  Alb  3.8  /  TBili  0.5  /  DBili  x   /  AST  21  /  ALT  13  /  AlkPhos  42  [08-14-20 @ 02:53]    PT/INR: PT 13.7 , INR 1.16       [08-14-20 @ 02:53]  PTT: 33.2       [08-14-20 @ 02:53]    CK 83      [08-13-20 @ 13:56]    Creatinine Trend:  SCr 4.80 [08-14 @ 02:53]  SCr 5.95 [08-13 @ 13:56]  SCr 7.96 [08-12 @ 03:43]  SCr 6.16 [08-11 @ 06:48]  SCr 9.46 [08-10 @ 00:33]        Iron 45, TIBC 145, %sat 31      [12-26-19 @ 23:24]  Ferritin 1369      [12-26-19 @ 23:24]  PTH -- (Ca 8.4)      [02-21-20 @ 08:54]   93  HbA1c 6.0      [02-21-20 @ 08:53]  TSH 1.68      [08-08-20 @ 00:47]  Lipid: chol 124, TG 59, HDL 64, LDL 47      [08-08-20 @ 00:47]    HCV 0.24, Nonreact      [08-09-20 @ 19:37]  HIV Nonreact      [08-09-20 @ 19:26]

## 2020-08-14 NOTE — DIETITIAN INITIAL EVALUATION ADULT. - ENERGY NEEDS
Ht: 5'10", Wt: 177lbs, BMI: 25.7kg/m2, IBW: 166lbs(+/-10%), 106%IBW  Pertinent information: 64 year old male with PMHx of HTN, HLD, GERD, DM, CAD, CKD, Admitted for CABG. Pt now POD 2 from CABGx4 and extubated on 8/13.   +1 generalized and +2 dave ankle and Right foot Edema, Skin: Intact Ht: 5'10", Wt: 177lbs, BMI: 25.7kg/m2, IBW: 166lbs(+/-10%), 106%IBW  Pertinent information: 64 year old male with PMHx of HTN, HLD, GERD, DM, CAD, CKD, Admitted for CABG. Pt now POD 1 from CABGx4 and extubated on 8/14.   +1 generalized and +2 dave ankle and Right foot Edema, Skin: Intact Ht: 5'10", Wt: 141.5lbs, BMI: 20.3kg/m2, IBW: 166lbs(+/-10%), 84%IBW  Pertinent information: 65 yo M w/ PMH HTN, CAD, carotid stenosis, ESRD on HD, CVA required PEG which was removed in July 2020. Admitted for CABG. Pt now POD 1 from CABGx4 and extubated on 8/14.   +1 generalized, Skin: Intact

## 2020-08-14 NOTE — DIETITIAN INITIAL EVALUATION ADULT. - PERTINENT MEDS FT
Insulin sliding scale   insulin infusion  Dobutamine  solumedrol  reglan. Insulin sliding scale   reglan  [facility]

## 2020-08-14 NOTE — CHART NOTE - NSCHARTNOTEFT_GEN_A_CORE
Upon Nutritional Assessment by the Registered Dietitian your patient was determined to meet criteria / has evidence of the following diagnosis/diagnoses:          [ ]  Mild Protein Calorie Malnutrition        [ X]  Moderate Protein Calorie Malnutrition        [ ] Severe Protein Calorie Malnutrition        [ ] Unspecified Protein Calorie Malnutrition        [ ] Underweight / BMI <19        [ ] Morbid Obesity / BMI > 40      Findings as based on:  [ ] Comprehensive nutrition assessment   [ ] Nutrition Focused Physical Exam  [ ] Other: 12.5% Wt loss x6 months, Edema, ? swallowing difficulty, suspected not meeting estimated needs PTA       Nutrition Plan/Recommendations:      1. Defer PO Diet/consistency to team    2. Consider Nepro x2 daily to supplement intake if medically cleared for PO intake.   3. Recommend swallow evaluation for recent history of dysphagia      PROVIDER Section:     By signing this assessment you are acknowledging and agree with the diagnosis/diagnoses assigned by the Registered Dietitian    Comments:

## 2020-08-14 NOTE — DIETITIAN INITIAL EVALUATION ADULT. - OTHER INFO
Pt seen for LOS, out of bed to chair.   Pt reports being in pain, RN aware, Pt awaiting pain medication. Pt has limited participation in RD interview at this time due to pain; mostly amenable to answering only yea or no questions.     Pt reports height at 5'10" but chart had both 5'6" and 5'7". Pt appears closer to 5'10". Pt then reports his UBW to be 140lbs, however Pt admitted at 177lbs. He denies any recent weight changes. Visually pt appears closer to 177lbs. Pt is currently 191lbs but this increase likely related to intraoperative fluid shifts.     Pt reports PTA he had a good PO intake. confirmed he follows a therapeutic diet but cannot elaborate on diet recall at this time.   Pt has T2DM, Hgba1c is 9.3%, Pt takes insulin and reports his PCP manages his DM.     Pt briefly educated on importance of increased nutrient needs to promote post op wound healing. Discussed protein rich foods to promote healing. Pt wiling to try Glucerna to supplement intake.     Pt aware RD remains available for on going education and to monitor PO intake.   Pt denies GI distress, chewing/swallowing difficulty, micronutrient supplements. NKFA Pt seen for LOS, out of bed to chair.   Pt reports being in pain, RN aware, Pt awaiting pain medication. Pt has limited participation in RD interview at this time due to pain; mostly amenable to answering only yes or no questions.     Pt then reports his UBW to be 140lbs, admitted at 141.5lbs. He denies any recent weight changes Pt is currently 166lbs but this increase likely related to interoperative fluids shifts.   Of note, Pt was 161lbs from previous RD note in February, indicating a 20lbs weight loss x 6 months (12.4% loss x6 months)    Pt reports PTA he had a good PO intake. confirmed he follows a therapeutic diet but cannot elaborate on diet recall at this time.   Pt has T2DM, Hgba1c is 6.6%, Pt takes insulin and reports his PCP manages his DM.   Pt reports having dysphagia and was on regular food and thickened liquids PTA. Will review with PA. Has history of PEG which was recently removed in July 2020.     Pt briefly educated on importance of increased nutrient needs to promote post op wound healing. Discussed protein rich foods to promote healing. Pt wiling to try Glucerna to supplement intake.     Pt aware RD remains available for on going education and to monitor PO intake.   Pt denies GI distress, chewing/swallowing difficulty, micronutrient supplements. NKFA

## 2020-08-14 NOTE — SWALLOW BEDSIDE ASSESSMENT ADULT - SLP PERTINENT HISTORY OF CURRENT PROBLEM
63 yo M w/ PMH HTN, CAD s/p LAD stent 2009, 2019, carotid stenosis, ESRD on HD (MWF Dr Cardenas, [facility]- on transplant list), DM (on IV insulin, controlled, managed by Jamie Corral), CVA c/b status epilepticus requiring intubation and PEG in 12/2019-went to rehab, peg was removed July 2020, dysphagia s/p PEG, atrial fibrillation (on eliquis last dose 8/5/2020), anemia and GI bleed (Feb 2020) who presents today for cardiac angiogram. Patient reports cardiac evaluation for renal transplant- pharm stress test completed on 7/31/2020, revealing abnormal study: there is medium sized, moderate to severe defects in mid to distal anterior, mid to distal anterolateral walls predominantly reversible consistent with infarction with sig.. zackery- infarct ischemia. LVEF 45%. Recommended for cardiac angiogram for further ischemic evaluation. s/p cath 8/7.

## 2020-08-14 NOTE — PROGRESS NOTE ADULT - PROBLEM SELECTOR PLAN 2
ESRD on HD (MWF)  HD today via permacatjosefa PATE'c permacatjosefa by IR today after HD for OR tmmrw ESRD on HD (MWF)  HD today via temp catheter

## 2020-08-14 NOTE — PROGRESS NOTE ADULT - ASSESSMENT
63 yo M w/ PMH HTN, CAD s/p LAD stent 2009, 2019, carotid stenosis, ESRD on HD (MWF Dr Cardenas, [place]- on transplant list), DM (on IV insulin, controlled, managed by Jamie Corral), CVA c/b status epilepticus requiring intubation and PEG in 12/2019-went to rehab, peg was removed July 2020, dysphagia s/p PEG, atrial fibrillation (on eliquis last dose 8/5/2020), anemia and GI bleed (Feb 2020) who presents today for cardiac angiogram. Patient reports cardiac evaluation for renal transplant- pharm stress test completed on 7/31/2020, revealing abnormal study: there is medium sized, moderate to severe defects in mid to distal anterior, mid to distal anterolateral walls predominantly reversible consistent with infarction with sig.. zackery- infarct ischemia. LVEF 45%. Recommended for cardiac angiogram for further ischemic evaluation. Patient is s/p cath-TVD-plan for CABG 8/13/20 with Dr. Guillory.     Son [phone] (07 Aug 2020 06:50)    8/12 VSS; AFB x3 Negative - HD today, plan to remove permacath by IR after HD. Plan for OR with Dr. Guillory for CABG tomorrow 8/13/20.     8/13/20 Off-Pump CABG x 3: LIMA-LAD, LSVG-OM1, LSVG-OM2  Urgent post op dialysis for hyperkalemia, permacath d/c preop, temp catheter placed  He was on a dysphagia diet at home preop, s&s consult pending.  Afib preop, on eliquis, resumed, beta blockers started.  Transferred to sdu.

## 2020-08-14 NOTE — SWALLOW BEDSIDE ASSESSMENT ADULT - SWALLOW EVAL: RECOMMENDED FEEDING/EATING TECHNIQUES
allow for swallow between intakes/check mouth frequently for oral residue/pocketing/crush medication (when feasible)/no straws/position upright (90 degrees)/small sips/bites/alternate food with liquid/maintain upright posture during/after eating for 30 mins/oral hygiene

## 2020-08-14 NOTE — DIETITIAN INITIAL EVALUATION ADULT. - ADD RECOMMEND
1. continue CHO diet, 2. Add Glucerna x2 daily 3. Encouraged adequate PO intake 4. Consider endocrinology consult for Hgba1c >9%, 5. Trend GI tolerance 1. Defer PO Diet/consistency to team  2. Consider Nepro x2 daily to supplement intake if medically cleared for PO intake. 3. Encouraged adequate PO intake 4. Recommend swallow evaluation for recent history of dysphagia , 5. Trend GI tolerance 1. Defer PO Diet/consistency to team  2. Consider Nepro x2 daily to supplement intake if medically cleared for PO intake. 3. Recommend swallow evaluation for recent history of dysphagia , 4. Trend GI tolerance

## 2020-08-14 NOTE — SWALLOW BEDSIDE ASSESSMENT ADULT - COMMENTS
Hospital Course:   8/8 VSS. s/p ECHO. As per son patient had positive QuantiFeron Gold at dialysis center, asymptomatic- ID consulted.   8/9 VSS; Echo results; EF=62%. Per ID, "Pt is afebrile without leukocytosis. CT Chest per radiology report with stable opacities when compared to 1/2020. On my review of the CT it appears that the RUL fissural opacity appears more prominent. In any case in this patient planned for renal transplant so, I would rule out for MTB. "  8/10 VSS. CT reviewed QuantiFeron gold/AFIB received by lab. HD today.  8/11 VSS. AFB negative x2, third pending results. IR consulted to remove Permcath Wednesday after HD.  8/12 VSS; AFB x3 Negative - HD today, plan to remove permacath by IR after HD.   8/13/20 Off-Pump CABG x 3: LIMA-LAD, LSVG-OM1, LSVG-OM2. Urgent post op dialysis for hyperkalemia, permacath d/c preop, temp catheter placed. HD with NO UF per CTICU.  8/14: Extubated Hospital Course:   8/8 VSS. s/p ECHO. As per son patient had positive QuantiFeron Gold at dialysis center, asymptomatic- ID consulted.   8/9 VSS; Echo results; EF=62%. Per ID, "Pt is afebrile without leukocytosis. CT Chest per radiology report with stable opacities when compared to 1/2020. RUL fissural opacity appears more prominent. In any case in this patient planned for renal transplant so, I would rule out for MTB. "  8/10 VSS. HD today.  8/11 VSS. AFB negative x2, third pending results.   8/12 VSS; AFB x3 Negative - HD today, plan to remove permacath by IR after HD.   8/13/20 Off-Pump CABG x 3: LIMA-LAD, LSVG-OM1, LSVG-OM2. Urgent post op dialysis for hyperkalemia, permacath d/c preop, temp catheter placed. HD with NO UF per CTICU.  8/14: Extubated. S&S consulted due to history of dysphagia.

## 2020-08-14 NOTE — PROGRESS NOTE ADULT - SUBJECTIVE AND OBJECTIVE BOX
CHAD DUNBAR  MRN-[id number]  Patient is a 64y old  Male who presents with a chief complaint of CABG (13 Aug 2020 19:00)    HPI:  65 yo M w/ PMH HTN, CAD s/p LAD stent 2009, 2019, carotid stenosis, ESRD on HD (MWF Dr Cardenas, [place] [facility]- on transplant list), DM (on IV insulin, controlled, managed by Jamie Corral), CVA c/b status epilepticus requiring intubation and PEG in 12/2019-went to rehab, peg was removed July 2020, dysphagia s/p PEG, atrial fibrillation (on eliquis last dose 8/5/2020), anemia and GI bleed (Feb 2020) who presents today for cardiac angiogram. Patient reports cardiac evaluation for renal transplant- pharm stress test completed on 7/31/2020, revealing abnormal study: there is medium sized, moderate to severe defects in mid to distal anterior, mid to distal anterolateral walls predominantly reversible consistent with infarction with sig.. zackery- infarct ischemia. LVEF 45%. Recommended for cardiac angiogram for further ischemic evaluation     Son [phone] (07 Aug 2020 06:50)      Surgery/Hospital Course:  8/7 Cath  8/12 HD with 2L UF  8/13 CABG    Today:    REVIEW OF SYSTEMS:  Gen: No fever  EYES/ENT: No visual changes;  No vertigo or throat pain   NECK: No pain   RES:  No shortness of breath or Cough  CARD: No chest pain   GI: No abdominal pain  : No dysuria  NEURO: No weakness  SKIN: No itching, rashes     ICU Vital Signs Last 24 Hrs  T(C): 37 (14 Aug 2020 08:00), Max: 37.5 (13 Aug 2020 20:00)  T(F): 98.6 (14 Aug 2020 08:00), Max: 99.5 (13 Aug 2020 20:00)  HR: 74 (14 Aug 2020 08:00) (54 - 83)  BP: 153/66 (14 Aug 2020 08:00) (126/60 - 153/66)  BP(mean): 95 (14 Aug 2020 08:00) (87 - 98)  ABP: 167/47 (14 Aug 2020 08:00) (119/41 - 172/47)  ABP(mean): 83 (14 Aug 2020 08:00) (60 - 83)  RR: 15 (14 Aug 2020 08:00) (11 - 24)  SpO2: 100% (14 Aug 2020 08:00) (100% - 100%)      Physical Exam:  Gen:  Awake, alert   CNS: non focal 	  Neck: no JVD  RES : clear , no wheezing              CVS: Regular  rhythm. Normal S1/S2  Abd: Soft, non-distended. Bowel sounds present.  Skin: No rash.  Ext:  no edema    ============================I/O===========================   I&O's Detail    13 Aug 2020 07:01  -  14 Aug 2020 07:00  --------------------------------------------------------  IN:    Albumin 5%  - 250 mL: 750 mL    insulin regular Infusion: 23 mL    IV PiggyBack: 600 mL    norepinephrine Infusion: 49.7 mL    Other: 600 mL    propofol Infusion: 55.7 mL    Sodium Chloride 0.9% IV Bolus: 750 mL    sodium chloride 0.9%.: 190 mL  Total IN: 3018.4 mL    OUT:    Chest Tube: 110 mL    Chest Tube: 280 mL    Indwelling Catheter - Urethral: 410 mL    Other: 600 mL  Total OUT: 1400 mL    Total NET: 1618.4 mL      14 Aug 2020 07:01  -  14 Aug 2020 09:13  --------------------------------------------------------  IN:    sodium chloride 0.9%.: 20 mL  Total IN: 20 mL    OUT:    Chest Tube: 30 mL    Indwelling Catheter - Urethral: 50 mL  Total OUT: 80 mL    Total NET: -60 mL        ============================ LABS =========================                        8.8    7.67  )-----------( 136      ( 14 Aug 2020 02:53 )             27.7     08-14    134<L>  |  97  |  38<H>  ----------------------------<  92  4.7   |  22  |  4.80<H>    Ca    9.4      14 Aug 2020 02:53  Phos  4.5     08-14  Mg     1.7     08-14    TPro  5.8<L>  /  Alb  3.8  /  TBili  0.5  /  DBili  x   /  AST  21  /  ALT  13  /  AlkPhos  42  08-14    LIVER FUNCTIONS - ( 14 Aug 2020 02:53 )  Alb: 3.8 g/dL / Pro: 5.8 g/dL / ALK PHOS: 42 U/L / ALT: 13 U/L / AST: 21 U/L / GGT: x           PT/INR - ( 14 Aug 2020 02:53 )   PT: 13.7 sec;   INR: 1.16 ratio         PTT - ( 14 Aug 2020 02:53 )  PTT:33.2 sec  ABG - ( 14 Aug 2020 05:17 )  pH, Arterial: 7.41  pH, Blood: x     /  pCO2: 38    /  pO2: 153   / HCO3: 24    / Base Excess: -.2   /  SaO2: 98                  ======================Micro/Rad/Cardio=================  Culture: Reviewed   CXR: Reviewed  Echo:Reviewed  ======================================================  PAST MEDICAL & SURGICAL HISTORY:  Intubation of airway performed without difficulty: Dec 2019  CVA c/b status epilepticus requiring intubation and PEG in 12/2019-  PEG (percutaneous endoscopic gastrostomy) status: removed July 2020  Anemia  CVA (cerebral vascular accident): 12/13/19 with residual bilateral weakness  Hypothyroidism  CRI (Chronic Renal Insufficiency)  CAD (Coronary Artery Disease): with Stents in 06/2009 and 6/2019  Dyslipidemia  Diabetes  Hypertension  History of insertion of stent into coronary artery bypass graft: 2009 and 2019    ====================ASSESSMENT ==============  CVA  Coronary Artery Disease  Hypertension  Chronic Renal Insufficiency  Dyslipidemia  Diabetes  Hypothyroidism      Plan:  ====================== NEUROLOGY=====================  Nonfocal, continue to monitor neuro status as per ICU protocol    ==================== RESPIRATORY======================  Continue oxygen therapy via CPAP as tolerated  Encourage incentive spirometry, continue pulse ox monitoring, follow ABGs     Mechanical Ventilation:  Mode: CPAP with PS  FiO2: 40  PEEP: 5  PS: 5  MAP: 7  PIP: 11      ====================CARDIOVASCULAR==================  S/P CABG  Post op vasogenic shock which required pressors, not on any active pressors at this time  Blood pressure support with PO Metoprolol  ASA and Lipitor for CAD    metoprolol tartrate 25 milliGRAM(s) Oral two times a day  atorvastatin 80 milliGRAM(s) Oral at bedtime  aspirin enteric coated 81 milliGRAM(s) Oral daily    ===================HEMATOLOGIC/ONC ===================  Acute blood loss anemia   Monitor H&H and transfuse prn   Anticoagulation therapy with PO Apixaban     apixaban 5 milliGRAM(s) Oral every 12 hours    ===================== RENAL =========================  End stage renal disease, on HD (M/W/F))  Resolved Hyperkalemia s/p HD last night for clearance   Continue monitoring urine output, I&Os, BUN/Cr    ==================== GASTROINTESTINAL===================  Clear liquid diet, OGT to drainage.    Bowel regimen with Miralax    dextrose 5%. 1000 milliLiter(s) (50 mL/Hr) IV Continuous <Continuous>  GI prophylaxis, pantoprazole    Tablet 40 milliGRAM(s) Oral before breakfast  polyethylene glycol 3350 17 Gram(s) Oral daily  sodium chloride 0.9%. 1000 milliLiter(s) (10 mL/Hr) IV Continuous <Continuous>    =======================    ENDOCRINE  =====================  Hx of DM2 requiring glucose control with Humalog sliding scale and glucagon prn  Synthroid for hypothyroidism     dextrose 40% Gel 15 Gram(s) Oral once PRN Blood Glucose LESS THAN 70 milliGRAM(s)/deciLiter  dextrose 50% Injectable 12.5 Gram(s) IV Push once  dextrose 50% Injectable 25 Gram(s) IV Push once  dextrose 50% Injectable 25 Gram(s) IV Push once  glucagon  Injectable 1 milliGRAM(s) IntraMuscular once PRN Glucose <70 milliGRAM(s)/deciLiter  insulin lispro (HumaLOG) corrective regimen sliding scale   SubCutaneous Before meals and at bedtime  levothyroxine 50 MICROGram(s) Oral daily    ========================INFECTIOUS DISEASE================  Continue perioperative antibiotic Cefuroxime     cefuroxime  IVPB 1500 milliGRAM(s) IV Intermittent every 12 hours      Patient requires continuous monitoring with bedside rhythm monitoring, pulse ox monitoring, and intermittent blood gas analysis. Care plan discussed with ICU care team. Patient remained critical and required more than usual post op care.    By signing my name below, ISol, attest that this documentation has been prepared under the direction and in the presence of WALDO Martínez   Electronically signed: Cristine Harden, 08-14-20 @ 09:13    IMeghan , personally performed the services described in this documentation. all medical record entries made by the kevinibharvey were at my direction and in my presence. I have reviewed the chart and agree that the record reflects my personal performance and is accurate and complete  Electronically signed: WALDO Martínez CHAD DUNBAR  MRN-[id number]  Patient is a 64y old  Male who presents with a chief complaint of CABG (13 Aug 2020 19:00)    HPI:  63 yo M w/ PMH HTN, CAD s/p LAD stent 2009, 2019, carotid stenosis, ESRD on HD (MWF Dr Cardenas, [facility]- on transplant list), DM (on IV insulin, controlled, managed by Jamie Corral), CVA c/b status epilepticus requiring intubation and PEG in 12/2019-went to rehab, peg was removed July 2020, dysphagia s/p PEG, atrial fibrillation (on eliquis last dose 8/5/2020), anemia and GI bleed (Feb 2020) who presents today for cardiac angiogram. Patient reports cardiac evaluation for renal transplant- pharm stress test completed on 7/31/2020, revealing abnormal study: there is medium sized, moderate to severe defects in mid to distal anterior, mid to distal anterolateral walls predominantly reversible consistent with infarction with sig.. zackery- infarct ischemia. LVEF 45%. Recommended for cardiac angiogram for further ischemic evaluation     Son [phone] (07 Aug 2020 06:50)      Surgery/Hospital Course:  8/7 Cath  8/12 HD with 2L UF  8/13 CABG    Today:    REVIEW OF SYSTEMS:  Gen: No fever  EYES/ENT: No visual changes;  No vertigo or throat pain   NECK: No pain   RES:  No shortness of breath or Cough  CARD: No chest pain   GI: No abdominal pain  : No dysuria  NEURO: No weakness  SKIN: No itching, rashes     ICU Vital Signs Last 24 Hrs  T(C): 37 (14 Aug 2020 08:00), Max: 37.5 (13 Aug 2020 20:00)  T(F): 98.6 (14 Aug 2020 08:00), Max: 99.5 (13 Aug 2020 20:00)  HR: 74 (14 Aug 2020 08:00) (54 - 83)  BP: 153/66 (14 Aug 2020 08:00) (126/60 - 153/66)  BP(mean): 95 (14 Aug 2020 08:00) (87 - 98)  ABP: 167/47 (14 Aug 2020 08:00) (119/41 - 172/47)  ABP(mean): 83 (14 Aug 2020 08:00) (60 - 83)  RR: 15 (14 Aug 2020 08:00) (11 - 24)  SpO2: 100% (14 Aug 2020 08:00) (100% - 100%)      Physical Exam:  Gen:  Awake, alert   CNS: non focal 	  Neck: no JVD  RES : clear , no wheezing              CVS: Regular  rhythm. Normal S1/S2  Abd: Soft, non-distended. Bowel sounds present.  Skin: No rash.  Ext:  no edema    ============================I/O===========================   I&O's Detail    13 Aug 2020 07:01  -  14 Aug 2020 07:00  --------------------------------------------------------  IN:    Albumin 5%  - 250 mL: 750 mL    insulin regular Infusion: 23 mL    IV PiggyBack: 600 mL    norepinephrine Infusion: 49.7 mL    Other: 600 mL    propofol Infusion: 55.7 mL    Sodium Chloride 0.9% IV Bolus: 750 mL    sodium chloride 0.9%.: 190 mL  Total IN: 3018.4 mL    OUT:    Chest Tube: 110 mL    Chest Tube: 280 mL    Indwelling Catheter - Urethral: 410 mL    Other: 600 mL  Total OUT: 1400 mL    Total NET: 1618.4 mL      14 Aug 2020 07:01  -  14 Aug 2020 09:13  --------------------------------------------------------  IN:    sodium chloride 0.9%.: 20 mL  Total IN: 20 mL    OUT:    Chest Tube: 30 mL    Indwelling Catheter - Urethral: 50 mL  Total OUT: 80 mL    Total NET: -60 mL        ============================ LABS =========================                        8.8    7.67  )-----------( 136      ( 14 Aug 2020 02:53 )             27.7     08-14    134<L>  |  97  |  38<H>  ----------------------------<  92  4.7   |  22  |  4.80<H>    Ca    9.4      14 Aug 2020 02:53  Phos  4.5     08-14  Mg     1.7     08-14    TPro  5.8<L>  /  Alb  3.8  /  TBili  0.5  /  DBili  x   /  AST  21  /  ALT  13  /  AlkPhos  42  08-14    LIVER FUNCTIONS - ( 14 Aug 2020 02:53 )  Alb: 3.8 g/dL / Pro: 5.8 g/dL / ALK PHOS: 42 U/L / ALT: 13 U/L / AST: 21 U/L / GGT: x           PT/INR - ( 14 Aug 2020 02:53 )   PT: 13.7 sec;   INR: 1.16 ratio         PTT - ( 14 Aug 2020 02:53 )  PTT:33.2 sec  ABG - ( 14 Aug 2020 05:17 )  pH, Arterial: 7.41  pH, Blood: x     /  pCO2: 38    /  pO2: 153   / HCO3: 24    / Base Excess: -.2   /  SaO2: 98                  ======================Micro/Rad/Cardio=================  Culture: Reviewed   CXR: Reviewed  Echo:Reviewed  ======================================================  PAST MEDICAL & SURGICAL HISTORY:  Intubation of airway performed without difficulty: Dec 2019  CVA c/b status epilepticus requiring intubation and PEG in 12/2019-  PEG (percutaneous endoscopic gastrostomy) status: removed July 2020  Anemia  CVA (cerebral vascular accident): 12/13/19 with residual bilateral weakness  Hypothyroidism  CRI (Chronic Renal Insufficiency)  CAD (Coronary Artery Disease): with Stents in 06/2009 and 6/2019  Dyslipidemia  Diabetes  Hypertension  History of insertion of stent into coronary artery bypass graft: 2009 and 2019    ====================ASSESSMENT ==============  CVA  Coronary Artery Disease  Hypertension  Chronic Renal Insufficiency  Dyslipidemia  Diabetes  Hypothyroidism      Plan:  ====================== NEUROLOGY=====================  Nonfocal, continue to monitor neuro status as per ICU protocol    ==================== RESPIRATORY======================  Oxygenating well on NC.   Remove CT. CXR post removal   Encourage IS/ Ambulation.       ====================CARDIOVASCULAR==================  S/P CABG  Post op vasogenic shock which required pressors, not on any active pressors at this time  Blood pressure support with PO Metoprolol  ASA and Lipitor for CAD    metoprolol tartrate 25 milliGRAM(s) Oral two times a day  atorvastatin 80 milliGRAM(s) Oral at bedtime  aspirin enteric coated 81 milliGRAM(s) Oral daily  Apixaban restarted   ===================HEMATOLOGIC/ONC ===================  Acute blood loss anemia   Monitor H&H and transfuse prn   Anticoagulation therapy with PO Apixaban     apixaban 5 milliGRAM(s) Oral every 12 hours    ===================== RENAL =========================  End stage renal disease, on HD (M/W/F))  Resolved Hyperkalemia s/p HD last night for clearance   Continue monitoring urine output, I&Os, BUN/Cr    ==================== GASTROINTESTINAL===================  Clear liquid diet, OGT to drainage.   Will need S/S eval given remote hx of dysphagia s/p stroke.   Bowel regimen with Miralax    dextrose 5%. 1000 milliLiter(s) (50 mL/Hr) IV Continuous <Continuous>  GI prophylaxis, pantoprazole    Tablet 40 milliGRAM(s) Oral before breakfast  polyethylene glycol 3350 17 Gram(s) Oral daily  sodium chloride 0.9%. 1000 milliLiter(s) (10 mL/Hr) IV Continuous <Continuous>    =======================    ENDOCRINE  =====================  Hx of DM2 requiring glucose control with Humalog sliding scale and glucagon prn  Synthroid for hypothyroidism     dextrose 40% Gel 15 Gram(s) Oral once PRN Blood Glucose LESS THAN 70 milliGRAM(s)/deciLiter  dextrose 50% Injectable 12.5 Gram(s) IV Push once  dextrose 50% Injectable 25 Gram(s) IV Push once  dextrose 50% Injectable 25 Gram(s) IV Push once  glucagon  Injectable 1 milliGRAM(s) IntraMuscular once PRN Glucose <70 milliGRAM(s)/deciLiter  insulin lispro (HumaLOG) corrective regimen sliding scale   SubCutaneous Before meals and at bedtime  levothyroxine 50 MICROGram(s) Oral daily    ========================INFECTIOUS DISEASE================  Continue perioperative antibiotic Cefuroxime     cefuroxime  IVPB 1500 milliGRAM(s) IV Intermittent every 12 hours      Patient requires continuous monitoring with bedside rhythm monitoring, pulse ox monitoring, and intermittent blood gas analysis. Care plan discussed with ICU care team. Patient remained critical and required more than usual post op care.    By signing my name below, ISol, attest that this documentation has been prepared under the direction and in the presence of WALDO Martínez   Electronically signed: Cristine Harden, 08-14-20 @ 09:13    IMeghan , personally performed the services described in this documentation. all medical record entries made by the kevinibharvey were at my direction and in my presence. I have reviewed the chart and agree that the record reflects my personal performance and is accurate and complete  Electronically signed: WALDO Martínez

## 2020-08-14 NOTE — PROGRESS NOTE ADULT - ASSESSMENT
ESRD on HD ( MWF) via perma cath  PT from [facility]  s/p cath 8/7  s/p HD on 8/12  with 2 L UF  permacath removed on 8/12 prior to sx   s/p CABG  8/13  s/p HD on 8/13 with NO UF per CTICU  Plan for HD today with no UF per CTICU  Consent obtained from son at [phone]  MOnitor BMP    Anemia  Hb at goal for ESRD   Monitor at present    HTN  Bp fluctutaing   MOnitor BP  Low salt diet    CKD -BMD  Check PTH  Monitor serum calcium and PO4

## 2020-08-14 NOTE — SWALLOW BEDSIDE ASSESSMENT ADULT - ASR SWALLOW ASPIRATION MONITOR
cough/gurgly voice/fever/pneumonia/throat clearing/upper respiratory infection/If noted:  D/C p.o. intake, provide non-oral nutrition/hydration/meds and consult this service @[phone]./change of breathing pattern

## 2020-08-15 DIAGNOSIS — E11.9 TYPE 2 DIABETES MELLITUS WITHOUT COMPLICATIONS: ICD-10-CM

## 2020-08-15 DIAGNOSIS — E03.9 HYPOTHYROIDISM, UNSPECIFIED: ICD-10-CM

## 2020-08-15 LAB
ANION GAP SERPL CALC-SCNC: 13 MMOL/L — SIGNIFICANT CHANGE UP (ref 5–17)
APTT BLD: 32.4 SEC — SIGNIFICANT CHANGE UP (ref 27.5–35.5)
APTT BLD: 39.1 SEC — HIGH (ref 27.5–35.5)
BASOPHILS # BLD AUTO: 0.03 K/UL — SIGNIFICANT CHANGE UP (ref 0–0.2)
BASOPHILS NFR BLD AUTO: 0.3 % — SIGNIFICANT CHANGE UP (ref 0–2)
BUN SERPL-MCNC: 49 MG/DL — HIGH (ref 7–23)
CALCIUM SERPL-MCNC: 9 MG/DL — SIGNIFICANT CHANGE UP (ref 8.4–10.5)
CHLORIDE SERPL-SCNC: 94 MMOL/L — LOW (ref 96–108)
CO2 SERPL-SCNC: 24 MMOL/L — SIGNIFICANT CHANGE UP (ref 22–31)
CREAT SERPL-MCNC: 5.49 MG/DL — HIGH (ref 0.5–1.3)
EOSINOPHIL # BLD AUTO: 0.14 K/UL — SIGNIFICANT CHANGE UP (ref 0–0.5)
EOSINOPHIL NFR BLD AUTO: 1.5 % — SIGNIFICANT CHANGE UP (ref 0–6)
GLUCOSE BLDC GLUCOMTR-MCNC: 142 MG/DL — HIGH (ref 70–99)
GLUCOSE BLDC GLUCOMTR-MCNC: 188 MG/DL — HIGH (ref 70–99)
GLUCOSE BLDC GLUCOMTR-MCNC: 236 MG/DL — HIGH (ref 70–99)
GLUCOSE BLDC GLUCOMTR-MCNC: 247 MG/DL — HIGH (ref 70–99)
GLUCOSE BLDC GLUCOMTR-MCNC: 345 MG/DL — HIGH (ref 70–99)
GLUCOSE SERPL-MCNC: 146 MG/DL — HIGH (ref 70–99)
HCT VFR BLD CALC: 26.1 % — LOW (ref 39–50)
HCT VFR BLD CALC: 26.8 % — LOW (ref 39–50)
HGB BLD-MCNC: 8.2 G/DL — LOW (ref 13–17)
HGB BLD-MCNC: 8.5 G/DL — LOW (ref 13–17)
IMM GRANULOCYTES NFR BLD AUTO: 0.4 % — SIGNIFICANT CHANGE UP (ref 0–1.5)
LYMPHOCYTES # BLD AUTO: 1.56 K/UL — SIGNIFICANT CHANGE UP (ref 1–3.3)
LYMPHOCYTES # BLD AUTO: 16.4 % — SIGNIFICANT CHANGE UP (ref 13–44)
MCHC RBC-ENTMCNC: 28.4 PG — SIGNIFICANT CHANGE UP (ref 27–34)
MCHC RBC-ENTMCNC: 28.6 PG — SIGNIFICANT CHANGE UP (ref 27–34)
MCHC RBC-ENTMCNC: 31.4 GM/DL — LOW (ref 32–36)
MCHC RBC-ENTMCNC: 31.7 GM/DL — LOW (ref 32–36)
MCV RBC AUTO: 90.2 FL — SIGNIFICANT CHANGE UP (ref 80–100)
MCV RBC AUTO: 90.3 FL — SIGNIFICANT CHANGE UP (ref 80–100)
MONOCYTES # BLD AUTO: 1.23 K/UL — HIGH (ref 0–0.9)
MONOCYTES NFR BLD AUTO: 12.9 % — SIGNIFICANT CHANGE UP (ref 2–14)
NEUTROPHILS # BLD AUTO: 6.54 K/UL — SIGNIFICANT CHANGE UP (ref 1.8–7.4)
NEUTROPHILS NFR BLD AUTO: 68.5 % — SIGNIFICANT CHANGE UP (ref 43–77)
NRBC # BLD: 0 /100 WBCS — SIGNIFICANT CHANGE UP (ref 0–0)
NRBC # BLD: 0 /100 WBCS — SIGNIFICANT CHANGE UP (ref 0–0)
PLATELET # BLD AUTO: 128 K/UL — LOW (ref 150–400)
PLATELET # BLD AUTO: 129 K/UL — LOW (ref 150–400)
POTASSIUM SERPL-MCNC: 4.6 MMOL/L — SIGNIFICANT CHANGE UP (ref 3.5–5.3)
POTASSIUM SERPL-SCNC: 4.6 MMOL/L — SIGNIFICANT CHANGE UP (ref 3.5–5.3)
RBC # BLD: 2.89 M/UL — LOW (ref 4.2–5.8)
RBC # BLD: 2.97 M/UL — LOW (ref 4.2–5.8)
RBC # FLD: 18.1 % — HIGH (ref 10.3–14.5)
RBC # FLD: 18.3 % — HIGH (ref 10.3–14.5)
SODIUM SERPL-SCNC: 131 MMOL/L — LOW (ref 135–145)
WBC # BLD: 9.54 K/UL — SIGNIFICANT CHANGE UP (ref 3.8–10.5)
WBC # BLD: 9.97 K/UL — SIGNIFICANT CHANGE UP (ref 3.8–10.5)
WBC # FLD AUTO: 9.54 K/UL — SIGNIFICANT CHANGE UP (ref 3.8–10.5)
WBC # FLD AUTO: 9.97 K/UL — SIGNIFICANT CHANGE UP (ref 3.8–10.5)

## 2020-08-15 PROCEDURE — 71045 X-RAY EXAM CHEST 1 VIEW: CPT | Mod: 26

## 2020-08-15 RX ORDER — INSULIN LISPRO 100/ML
VIAL (ML) SUBCUTANEOUS AT BEDTIME
Refills: 0 | Status: DISCONTINUED | OUTPATIENT
Start: 2020-08-15 | End: 2020-08-19

## 2020-08-15 RX ORDER — INSULIN GLARGINE 100 [IU]/ML
12 INJECTION, SOLUTION SUBCUTANEOUS AT BEDTIME
Refills: 0 | Status: DISCONTINUED | OUTPATIENT
Start: 2020-08-15 | End: 2020-08-16

## 2020-08-15 RX ORDER — INSULIN GLARGINE 100 [IU]/ML
14 INJECTION, SOLUTION SUBCUTANEOUS AT BEDTIME
Refills: 0 | Status: DISCONTINUED | OUTPATIENT
Start: 2020-08-15 | End: 2020-08-15

## 2020-08-15 RX ORDER — HEPARIN SODIUM 5000 [USP'U]/ML
800 INJECTION INTRAVENOUS; SUBCUTANEOUS
Qty: 25000 | Refills: 0 | Status: COMPLETED | OUTPATIENT
Start: 2020-08-15 | End: 2021-07-14

## 2020-08-15 RX ORDER — INSULIN LISPRO 100/ML
VIAL (ML) SUBCUTANEOUS
Refills: 0 | Status: DISCONTINUED | OUTPATIENT
Start: 2020-08-15 | End: 2020-08-19

## 2020-08-15 RX ORDER — INSULIN LISPRO 100/ML
5 VIAL (ML) SUBCUTANEOUS
Refills: 0 | Status: DISCONTINUED | OUTPATIENT
Start: 2020-08-15 | End: 2020-08-16

## 2020-08-15 RX ORDER — HEPARIN SODIUM 5000 [USP'U]/ML
800 INJECTION INTRAVENOUS; SUBCUTANEOUS
Qty: 25000 | Refills: 0 | Status: DISCONTINUED | OUTPATIENT
Start: 2020-08-15 | End: 2020-08-16

## 2020-08-15 RX ORDER — INSULIN LISPRO 100/ML
6 VIAL (ML) SUBCUTANEOUS
Refills: 0 | Status: DISCONTINUED | OUTPATIENT
Start: 2020-08-15 | End: 2020-08-15

## 2020-08-15 RX ADMIN — OXYCODONE HYDROCHLORIDE 5 MILLIGRAM(S): 5 TABLET ORAL at 16:05

## 2020-08-15 RX ADMIN — PANTOPRAZOLE SODIUM 40 MILLIGRAM(S): 20 TABLET, DELAYED RELEASE ORAL at 05:05

## 2020-08-15 RX ADMIN — Medication 8: at 12:34

## 2020-08-15 RX ADMIN — Medication 50 MILLIGRAM(S): at 05:04

## 2020-08-15 RX ADMIN — OXYCODONE HYDROCHLORIDE 5 MILLIGRAM(S): 5 TABLET ORAL at 20:30

## 2020-08-15 RX ADMIN — POLYETHYLENE GLYCOL 3350 17 GRAM(S): 17 POWDER, FOR SOLUTION ORAL at 12:33

## 2020-08-15 RX ADMIN — Medication 4: at 18:10

## 2020-08-15 RX ADMIN — CHLORHEXIDINE GLUCONATE 1 APPLICATION(S): 213 SOLUTION TOPICAL at 04:57

## 2020-08-15 RX ADMIN — Medication 100 MILLIGRAM(S): at 16:36

## 2020-08-15 RX ADMIN — Medication 100 MILLIGRAM(S): at 05:04

## 2020-08-15 RX ADMIN — HEPARIN SODIUM 8 UNIT(S)/HR: 5000 INJECTION INTRAVENOUS; SUBCUTANEOUS at 15:29

## 2020-08-15 RX ADMIN — ATORVASTATIN CALCIUM 80 MILLIGRAM(S): 80 TABLET, FILM COATED ORAL at 21:16

## 2020-08-15 RX ADMIN — OXYCODONE HYDROCHLORIDE 5 MILLIGRAM(S): 5 TABLET ORAL at 05:04

## 2020-08-15 RX ADMIN — Medication 50 MILLIGRAM(S): at 15:29

## 2020-08-15 RX ADMIN — APIXABAN 5 MILLIGRAM(S): 2.5 TABLET, FILM COATED ORAL at 05:04

## 2020-08-15 RX ADMIN — Medication 50 MICROGRAM(S): at 05:04

## 2020-08-15 RX ADMIN — OXYCODONE HYDROCHLORIDE 5 MILLIGRAM(S): 5 TABLET ORAL at 21:00

## 2020-08-15 RX ADMIN — INSULIN GLARGINE 14 UNIT(S): 100 INJECTION, SOLUTION SUBCUTANEOUS at 21:25

## 2020-08-15 RX ADMIN — Medication 81 MILLIGRAM(S): at 12:33

## 2020-08-15 RX ADMIN — Medication 50 MILLIGRAM(S): at 21:16

## 2020-08-15 RX ADMIN — HEPARIN SODIUM 10 UNIT(S)/HR: 5000 INJECTION INTRAVENOUS; SUBCUTANEOUS at 22:10

## 2020-08-15 RX ADMIN — OXYCODONE HYDROCHLORIDE 5 MILLIGRAM(S): 5 TABLET ORAL at 16:36

## 2020-08-15 RX ADMIN — IVERMECTIN 12 MILLIGRAM(S): 3 TABLET ORAL at 15:29

## 2020-08-15 NOTE — PROGRESS NOTE ADULT - PROBLEM SELECTOR PLAN 2
ESRD on HD (MWF)  HD today via temp catheter ESRD on HD (MWF)  HD today via temp catheter- will need permacath

## 2020-08-15 NOTE — CONSULT NOTE ADULT - SUBJECTIVE AND OBJECTIVE BOX
HPI:  63 yo M w/ PMH HTN, CAD s/p LAD stent 2009, 2019, carotid stenosis, ESRD on HD (MWF Dr Cardenas, [place] [facility]- on transplant list), DM (on IV insulin, controlled, managed by Jamie Corral), CVA c/b status epilepticus requiring intubation and PEG in 12/2019-went to rehab, peg was removed July 2020, dysphagia s/p PEG, atrial fibrillation (on eliquis last dose 8/5/2020), anemia and GI bleed (Feb 2020) who presents today for cardiac angiogram. Patient reports cardiac evaluation for renal transplant- pharm stress test completed on 7/31/2020, revealing abnormal study: there is medium sized, moderate to severe defects in mid to distal anterior, mid to distal anterolateral walls predominantly reversible consistent with infarction with sig.. zackery- infarct ischemia. LVEF 45%. Recommended for cardiac angiogram for further ischemic evaluation     Son [phone] (07 Aug 2020 06:50)  Patient has history of diabetes, on insulin at home, no recent hypoglycemic episodes, no polyuria polydipsia. Patient follows up with PCP for diabetes management.    PAST MEDICAL & SURGICAL HISTORY:  Intubation of airway performed without difficulty: Dec 2019  CVA c/b status epilepticus requiring intubation and PEG in 12/2019-  PEG (percutaneous endoscopic gastrostomy) status: removed July 2020  Anemia  CVA (cerebral vascular accident): 12/13/19 with residual bilateral weakness  Hypothyroidism  CRI (Chronic Renal Insufficiency)  CAD (Coronary Artery Disease): with Stents in 06/2009 and 6/2019  Dyslipidemia  Diabetes  Hypertension  History of insertion of stent into coronary artery bypass graft: 2009 and 2019      FAMILY HISTORY:      Social History:    Outpatient Medications:    MEDICATIONS  (STANDING):  aspirin enteric coated 81 milliGRAM(s) Oral daily  atorvastatin 80 milliGRAM(s) Oral at bedtime  chlorhexidine 2% Cloths 1 Application(s) Topical <User Schedule>  dextrose 5%. 1000 milliLiter(s) (50 mL/Hr) IV Continuous <Continuous>  dextrose 50% Injectable 12.5 Gram(s) IV Push once  dextrose 50% Injectable 25 Gram(s) IV Push once  dextrose 50% Injectable 25 Gram(s) IV Push once  heparin  Infusion 800 Unit(s)/Hr (8 mL/Hr) IV Continuous <Continuous>  insulin glargine Injectable (LANTUS) 14 Unit(s) SubCutaneous at bedtime  insulin lispro (HumaLOG) corrective regimen sliding scale   SubCutaneous three times a day before meals  insulin lispro (HumaLOG) corrective regimen sliding scale   SubCutaneous at bedtime  insulin lispro Injectable (HumaLOG) 6 Unit(s) SubCutaneous three times a day before meals  levothyroxine 50 MICROGram(s) Oral daily  metoprolol tartrate 50 milliGRAM(s) Oral every 8 hours  pantoprazole    Tablet 40 milliGRAM(s) Oral before breakfast  polyethylene glycol 3350 17 Gram(s) Oral daily  sodium chloride 0.9%. 1000 milliLiter(s) (10 mL/Hr) IV Continuous <Continuous>    MEDICATIONS  (PRN):  acetaminophen   Tablet .. 650 milliGRAM(s) Oral every 6 hours PRN Mild Pain (1 - 3)  dextrose 40% Gel 15 Gram(s) Oral once PRN Blood Glucose LESS THAN 70 milliGRAM(s)/deciLiter  glucagon  Injectable 1 milliGRAM(s) IntraMuscular once PRN Glucose <70 milliGRAM(s)/deciLiter  oxyCODONE    IR 5 milliGRAM(s) Oral every 4 hours PRN Severe Pain (7 - 10)      Allergies    No Known Allergies    Intolerances      Review of Systems:  Constitutional: No fever, no chills  Eyes: No blurry vision  Neuro: No tremors  HEENT: No pain, no neck swelling  Cardiovascular: No chest pain, no palpitations  Respiratory: Has SOB, no cough  GI: No nausea, vomiting, abdominal pain  : No dysuria  Skin: no rash  MSK: Has leg swelling.  Psych: no depression  Endocrine: no polyuria, polydipsia    ALL OTHER SYSTEMS REVIEWED AND NEGATIVE    UNABLE TO OBTAIN    PHYSICAL EXAM:  VITALS: T(C): 36.6 (08-15-20 @ 19:00)  T(F): 97.8 (08-15-20 @ 19:00), Max: 98.6 (08-15-20 @ 05:03)  HR: 83 (08-15-20 @ 19:00) (72 - 83)  BP: 150/65 (08-15-20 @ 19:00) (95/53 - 150/65)  RR:  (18 - 18)  SpO2:  (96% - 100%)  Wt(kg): --  GENERAL: NAD, well-groomed, well-developed  EYES: No proptosis, no lid lag  HEENT:  Atraumatic, Normocephalic  THYROID: Normal size, no palpable nodules  RESPIRATORY: Clear to auscultation bilaterally; No rales, rhonchi, wheezing  CARDIOVASCULAR: Si S2, No murmurs;  GI: Soft, non distended, normal bowel sounds  SKIN: Dry, intact, No rashes or lesions  MUSCULOSKELETAL: Has BL lower extremity edema.  NEURO:  no tremor, sensation decreased in feet BL,    POCT Blood Glucose.: 236 mg/dL (08-15-20 @ 21:21)  POCT Blood Glucose.: 247 mg/dL (08-15-20 @ 17:25)  POCT Blood Glucose.: 345 mg/dL (08-15-20 @ 12:19)  POCT Blood Glucose.: 142 mg/dL (08-15-20 @ 07:38)  POCT Blood Glucose.: 188 mg/dL (08-15-20 @ 00:07)  POCT Blood Glucose.: 261 mg/dL (08-14-20 @ 21:17)  POCT Blood Glucose.: 186 mg/dL (08-14-20 @ 17:33)  POCT Blood Glucose.: 162 mg/dL (08-14-20 @ 12:21)  POCT Blood Glucose.: 150 mg/dL (08-14-20 @ 12:11)  POCT Blood Glucose.: 125 mg/dL (08-14-20 @ 07:03)  POCT Blood Glucose.: 102 mg/dL (08-14-20 @ 03:28)  POCT Blood Glucose.: 79 mg/dL (08-14-20 @ 01:41)  POCT Blood Glucose.: 77 mg/dL (08-14-20 @ 00:56)  POCT Blood Glucose.: 72 mg/dL (08-14-20 @ 00:27)  POCT Blood Glucose.: 71 mg/dL (08-13-20 @ 23:54)  POCT Blood Glucose.: 72 mg/dL (08-13-20 @ 23:36)  POCT Blood Glucose.: 84 mg/dL (08-13-20 @ 22:53)  POCT Blood Glucose.: 125 mg/dL (08-13-20 @ 22:01)  POCT Blood Glucose.: 173 mg/dL (08-13-20 @ 20:59)  POCT Blood Glucose.: 98 mg/dL (08-13-20 @ 17:54)  POCT Blood Glucose.: 79 mg/dL (08-13-20 @ 16:48)  POCT Blood Glucose.: 87 mg/dL (08-13-20 @ 16:20)  POCT Blood Glucose.: 119 mg/dL (08-13-20 @ 13:56)                            8.2    9.97  )-----------( 129      ( 15 Aug 2020 21:34 )             26.1       08-15    131<L>  |  94<L>  |  49<H>  ----------------------------<  146<H>  4.6   |  24  |  5.49<H>    EGFR if : 12<L>  EGFR if non : 10<L>    Ca    9.0      08-15  Mg     1.7     08-14  Phos  4.5     08-14    TPro  5.8<L>  /  Alb  3.8  /  TBili  0.5  /  DBili  x   /  AST  21  /  ALT  13  /  AlkPhos  42  08-14      Thyroid Function Tests:  08-08 @ 00:47 TSH 1.68 FreeT4 -- T3 75 Anti TPO -- Anti Thyroglobulin Ab -- TSI --          08-08 Chol 124 LDL 47 HDL 64 Trig 59    Radiology:

## 2020-08-15 NOTE — PROGRESS NOTE ADULT - SUBJECTIVE AND OBJECTIVE BOX
Dr. Barrientos  Office [phone]  Cell [phone]  Karolina HAAS  Cell [phone]      Patient is a 64y old  Male who presents with a chief complaint of CABG (14 Aug 2020 09:12)      Patient seen and examined at bedside. No chest pain/sob    VITALS:  T(F): 97.6 (08-15-20 @ 15:00), Max: 98.6 (08-15-20 @ 05:03)  HR: 72 (08-15-20 @ 15:00)  BP: 112/55 (08-15-20 @ 15:00)  RR: 18 (08-15-20 @ 15:00)  SpO2: 96% (08-15-20 @ 15:00)  Wt(kg): --    08-14 @ 07:01  -  08-15 @ 07:00  --------------------------------------------------------  IN: 830 mL / OUT: 400 mL / NET: 430 mL    08-15 @ 07:01  -  08-15 @ 16:00  --------------------------------------------------------  IN: 616 mL / OUT: 0 mL / NET: 616 mL          PHYSICAL EXAM:  Constitutional: NAD  Neck: No JVD  Respiratory: CTAB, no wheezes, rales or rhonchi  Cardiovascular: S1, S2, RRR  Gastrointestinal: BS+, soft, NT/ND  Extremities: No peripheral edema    Hospital Medications:   MEDICATIONS  (STANDING):  aspirin enteric coated 81 milliGRAM(s) Oral daily  atorvastatin 80 milliGRAM(s) Oral at bedtime  cefuroxime  IVPB 1500 milliGRAM(s) IV Intermittent every 12 hours  chlorhexidine 2% Cloths 1 Application(s) Topical <User Schedule>  dextrose 5%. 1000 milliLiter(s) (50 mL/Hr) IV Continuous <Continuous>  dextrose 50% Injectable 12.5 Gram(s) IV Push once  dextrose 50% Injectable 25 Gram(s) IV Push once  dextrose 50% Injectable 25 Gram(s) IV Push once  heparin  Infusion 800 Unit(s)/Hr (8 mL/Hr) IV Continuous <Continuous>  insulin glargine Injectable (LANTUS) 10 Unit(s) SubCutaneous at bedtime  insulin lispro (HumaLOG) corrective regimen sliding scale   SubCutaneous Before meals and at bedtime  levothyroxine 50 MICROGram(s) Oral daily  metoprolol tartrate 50 milliGRAM(s) Oral every 8 hours  pantoprazole    Tablet 40 milliGRAM(s) Oral before breakfast  polyethylene glycol 3350 17 Gram(s) Oral daily  sodium chloride 0.9%. 1000 milliLiter(s) (10 mL/Hr) IV Continuous <Continuous>      LABS:  08-15    131<L>  |  94<L>  |  49<H>  ----------------------------<  146<H>  4.6   |  24  |  5.49<H>    Ca    9.0      15 Aug 2020 05:26  Phos  4.5     08-14  Mg     1.7     08-14    TPro  5.8<L>  /  Alb  3.8  /  TBili  0.5  /  DBili      /  AST  21  /  ALT  13  /  AlkPhos  42  08-14    Creatinine Trend: 5.49 <--, 4.80 <--, 5.95 <--, 7.96 <--, 6.16 <--, 9.46 <--, 8.01 <--                                8.5    9.54  )-----------( 128      ( 15 Aug 2020 05:26 )             26.8     Urine Studies:      Iron 45, TIBC 145, %sat 31      [12-26-19 @ 23:24]  Ferritin 1369      [12-26-19 @ 23:24]  PTH -- (Ca 8.4)      [02-21-20 @ 08:54]   93  HbA1c 6.0      [02-21-20 @ 08:53]  TSH 1.68      [08-08-20 @ 00:47]  Lipid: chol 124, TG 59, HDL 64, LDL 47      [08-08-20 @ 00:47]    HCV 0.24, Nonreact      [08-09-20 @ 19:37]  HIV Nonreact      [08-09-20 @ 19:26]      RADIOLOGY & ADDITIONAL STUDIES:

## 2020-08-15 NOTE — CONSULT NOTE ADULT - PROBLEM SELECTOR RECOMMENDATION 9
Will increase Lantus to 12 units at bed time.  Will start Humalog 5 units before each meal in addition to Humalog correction scale coverage.  Patient counseled for compliance with consistent low carb diet.

## 2020-08-15 NOTE — CONSULT NOTE ADULT - ASSESSMENT
Assessment  DMT2: 64y Male with DM T2 with hyperglycemia admitted with CAD s/p CABG, was on insulin at home, blood sugars running high, no hypoglycemic episode,  eating partial meals, on dysphagia diet, drinking juices and soda..  Hypothyroidism: On Synthroid 50 mcg po daily, compliant with Synthroid intake, asymptomatic.    ESRD: On hemodialysis, labs, chart reviewed.              Joann Adame MD  Cell: [phone]  Office: [phone]

## 2020-08-15 NOTE — PROGRESS NOTE ADULT - ASSESSMENT
65 yo M w/ PMH HTN, CAD s/p LAD stent 2009, 2019, carotid stenosis, ESRD on HD (MWF Dr Cardenas, [place] [facility]- on transplant list), DM (on IV insulin, controlled, managed by Jamie Corral), CVA c/b status epilepticus requiring intubation and PEG in 12/2019-went to rehab, peg was removed July 2020, dysphagia s/p PEG, atrial fibrillation (on eliquis last dose 8/5/2020), anemia and GI bleed (Feb 2020) who presents today for cardiac angiogram. Patient reports cardiac evaluation for renal transplant- pharm stress test completed on 7/31/2020, revealing abnormal study: there is medium sized, moderate to severe defects in mid to distal anterior, mid to distal anterolateral walls predominantly reversible consistent with infarction with sig.. zackery- infarct ischemia. LVEF 45%. Recommended for cardiac angiogram for further ischemic evaluation. Patient is s/p cath-TVD-plan for CABG 8/13/20 with Dr. Guillory.     Son [phone] (07 Aug 2020 06:50)    8/12 VSS; AFB x3 Negative - HD today, plan to remove permacath by IR after HD. Plan for OR with Dr. Guillory for CABG tomorrow 8/13/20.     8/13/20 Off-Pump CABG x 3: LIMA-LAD, LSVG-OM1, LSVG-OM2  Urgent post op dialysis for hyperkalemia, permacath d/c preop, temp catheter placed  He was on a dysphagia diet at home preop, s&s consult pending.  Afib preop, on eliquis, resumed, beta blockers started.  Transferred to sdu.  8/15 The patient has  a central line that will need to be removed, hold eliquis.  Will need a permacath , ? monday, will need to contact IR Monday  H/o dysphagia, placed on dys 3 diet thickened liquids , speech and swallow following  + strongyloide antibody as per ID, 2 days of iverrectin( potential renal transplant candidate) 65 yo M w/ PMH HTN, CAD s/p LAD stent 2009, 2019, carotid stenosis, ESRD on HD (MWF Dr Cardenas, [place] [facility]- on transplant list), DM (on IV insulin, controlled, managed by Jamie Corral), CVA c/b status epilepticus requiring intubation and PEG in 12/2019-went to rehab, peg was removed July 2020, dysphagia s/p PEG, atrial fibrillation (on eliquis last dose 8/5/2020), anemia and GI bleed (Feb 2020) who presents today for cardiac angiogram. Patient reports cardiac evaluation for renal transplant- pharm stress test completed on 7/31/2020, revealing abnormal study: there is medium sized, moderate to severe defects in mid to distal anterior, mid to distal anterolateral walls predominantly reversible consistent with infarction with sig.. zackery- infarct ischemia. LVEF 45%. Recommended for cardiac angiogram for further ischemic evaluation. Patient is s/p cath-TVD-plan for CABG 8/13/20 with Dr. Guillory.     Son [phone] (07 Aug 2020 06:50)    8/12 VSS; AFB x3 Negative - HD today, plan to remove permacath by IR after HD. Plan for OR with Dr. Guillory for CABG tomorrow 8/13/20.     8/13/20 Off-Pump CABG x 3: LIMA-LAD, LSVG-OM1, LSVG-OM2  Urgent post op dialysis for hyperkalemia, permacath d/c preop, temp catheter placed  He was on a dysphagia diet at home preop, s&s consult pending.  Afib preop, on eliquis, resumed, beta blockers started.  Transferred to sdu.  8/15 The patient has  a central line that will need to be removed, hold eliquis.  Will need a permacath , ? monday, will need to contact IR Monday, consult placed in sunrise)  H/o dysphagia, placed on dys 3 diet thickened liquids , speech and swallow following  + strongyloide antibody as per ID, 2 days of iverrectin( potential renal transplant candidate)

## 2020-08-15 NOTE — PROGRESS NOTE ADULT - SUBJECTIVE AND OBJECTIVE BOX
rinku bryant    VITAL SIGNS  afib 78  Telemetry:    Vital Signs Last 24 Hrs  T(C): 36.9 (08-15-20 @ 07:13), Max: 37 (08-15-20 @ 05:03)  T(F): 98.5 (08-15-20 @ 07:13), Max: 98.6 (08-15-20 @ 05:03)  HR: 74 (08-15-20 @ 07:13) (67 - 120)  BP: 95/53 (08-15-20 @ 07:13) (95/53 - 143/74)  RR: 18 (08-15-20 @ 07:13) (12 - 20)  SpO2: 100% (08-15-20 @ 07:13) (100% - 100%)                       8.5<L>                131<L>| 24   | 49<H>        9.54  >-----------< 128<L>   ------------------------< 146<H>                 26.8<L>                4.6  | 94<L>| 5.49<H>                                                                     Ca 9.0   Mg x     Ph x        ,             8.8<L>                134<L>| 22   | 38<H>        7.67  >-----------< 136<L>   ------------------------< 92                    27.7<L>                4.7  | 97   | 4.80<H>                                                                     Ca 9.4   Mg 1.7   Ph 4.5              20 @ 07:01  -  08-15-20 @ 07:00  --------------------------------------------------------  IN: 830 mL / OUT: 400 mL / NET: 430 mL    08-15 @ 05:26  PT-- INR--  PTT39.1   @ 02:53  PT13.7 INR1.16  PTT33.2   13:56  PT15.5 INR1.32  PTT68.7  08-12 @ 23:48  PT-- INR--  PTT65.8   @ 03:43  PT-- INR--  PTT73.6   @ 21:31  PT-- INR--  PTT>200.0   @ 14:49  PT-- INR--  PTT88.2   @ 07:13  PT12.7 INR1.07  PTT53.9  08-10 @ 21:50  PT-- INR--  PTT80.4  08-10 @ 14:44  PT-- INR--  PTT73.3      Daily     Daily Weight in k.8 (15 Aug 2020 07:13)      CAPILLARY BLOOD GLUCOSE      POCT Blood Glucose.: 142 mg/dL (15 Aug 2020 07:38)  POCT Blood Glucose.: 188 mg/dL (15 Aug 2020 00:07)  POCT Blood Glucose.: 261 mg/dL (14 Aug 2020 21:17)  POCT Blood Glucose.: 186 mg/dL (14 Aug 2020 17:33)  POCT Blood Glucose.: 162 mg/dL (14 Aug 2020 12:21)  POCT Blood Glucose.: 150 mg/dL (14 Aug 2020 12:11)                Coumadin    [ ] YES          [ x]      NO         Eliquis                          PHYSICAL EXAM        Neurology: alert and oriented x 3, nonfocal, no gross deficits  CV : irreg, irreg  Sternal Wound :  CDI , Stable  Pacing Wires        [x  ]   Settings:    vvi                              Isolated  [  ]  Lungs: bibasilar crackles   Drains:      Abdomen: soft, nontender, nondistended, positive bowel sounds, last bowel movement preop  :          Extremities:     _trace_ edema, __neg _calf tenderness.                          L  __svg site cdi          acetaminophen   Tablet .. 650 milliGRAM(s) Oral every 6 hours PRN  aspirin enteric coated 81 milliGRAM(s) Oral daily  atorvastatin 80 milliGRAM(s) Oral at bedtime  cefuroxime  IVPB 1500 milliGRAM(s) IV Intermittent every 12 hours  chlorhexidine 2% Cloths 1 Application(s) Topical <User Schedule>  dextrose 40% Gel 15 Gram(s) Oral once PRN  dextrose 5%. 1000 milliLiter(s) IV Continuous <Continuous>  dextrose 50% Injectable 12.5 Gram(s) IV Push once  dextrose 50% Injectable 25 Gram(s) IV Push once  dextrose 50% Injectable 25 Gram(s) IV Push once  glucagon  Injectable 1 milliGRAM(s) IntraMuscular once PRN  insulin glargine Injectable (LANTUS) 10 Unit(s) SubCutaneous at bedtime  insulin lispro (HumaLOG) corrective regimen sliding scale   SubCutaneous Before meals and at bedtime  ivermectin 12 milliGRAM(s) Oral daily  levothyroxine 50 MICROGram(s) Oral daily  metoprolol tartrate 50 milliGRAM(s) Oral every 8 hours  oxyCODONE    IR 5 milliGRAM(s) Oral every 4 hours PRN  pantoprazole    Tablet 40 milliGRAM(s) Oral before breakfast  polyethylene glycol 3350 17 Gram(s) Oral daily  sodium chloride 0.9%. 1000 milliLiter(s) IV Continuous <Continuous>                    Physical Therapy Rec:   Home  [  ]   Home w/ PT  [  ]  Rehab  [  ]  Discussed with Cardiothoracic Team at AM rounds.

## 2020-08-15 NOTE — PROGRESS NOTE ADULT - ASSESSMENT
ESRD on HD ( MWF) via perma cath  PT from [facility]  s/p cath 8/7  s/p HD on 8/12  with 2 L UF  permacath removed on 8/12 prior to sx   s/p CABG  8/13  s/p HD on 8/13, 08/14 with femoral NO UF per CTICU  Consent obtained from son at [phone]  MOnitor BMP    Anemia  Hb at goal for ESRD   Monitor at present    HTN  Bp fluctutaing   MOnitor BP  Low salt diet    CKD -BMD  Check PTH  Monitor serum calcium and PO4

## 2020-08-16 LAB
ANION GAP SERPL CALC-SCNC: 18 MMOL/L — HIGH (ref 5–17)
APTT BLD: 35.2 SEC — SIGNIFICANT CHANGE UP (ref 27.5–35.5)
APTT BLD: 47.1 SEC — HIGH (ref 27.5–35.5)
APTT BLD: 52.2 SEC — HIGH (ref 27.5–35.5)
BUN SERPL-MCNC: 76 MG/DL — HIGH (ref 7–23)
CALCIUM SERPL-MCNC: 8.9 MG/DL — SIGNIFICANT CHANGE UP (ref 8.4–10.5)
CHLORIDE SERPL-SCNC: 92 MMOL/L — LOW (ref 96–108)
CO2 SERPL-SCNC: 21 MMOL/L — LOW (ref 22–31)
CREAT SERPL-MCNC: 7.41 MG/DL — HIGH (ref 0.5–1.3)
GLUCOSE BLDC GLUCOMTR-MCNC: 191 MG/DL — HIGH (ref 70–99)
GLUCOSE BLDC GLUCOMTR-MCNC: 218 MG/DL — HIGH (ref 70–99)
GLUCOSE BLDC GLUCOMTR-MCNC: 222 MG/DL — HIGH (ref 70–99)
GLUCOSE BLDC GLUCOMTR-MCNC: 96 MG/DL — SIGNIFICANT CHANGE UP (ref 70–99)
GLUCOSE SERPL-MCNC: 171 MG/DL — HIGH (ref 70–99)
HCT VFR BLD CALC: 23 % — LOW (ref 39–50)
HGB BLD-MCNC: 7.3 G/DL — LOW (ref 13–17)
MCHC RBC-ENTMCNC: 28.7 PG — SIGNIFICANT CHANGE UP (ref 27–34)
MCHC RBC-ENTMCNC: 31.7 GM/DL — LOW (ref 32–36)
MCV RBC AUTO: 90.6 FL — SIGNIFICANT CHANGE UP (ref 80–100)
NRBC # BLD: 0 /100 WBCS — SIGNIFICANT CHANGE UP (ref 0–0)
PLATELET # BLD AUTO: 114 K/UL — LOW (ref 150–400)
POTASSIUM SERPL-MCNC: 4.5 MMOL/L — SIGNIFICANT CHANGE UP (ref 3.5–5.3)
POTASSIUM SERPL-SCNC: 4.5 MMOL/L — SIGNIFICANT CHANGE UP (ref 3.5–5.3)
RBC # BLD: 2.54 M/UL — LOW (ref 4.2–5.8)
RBC # FLD: 17.9 % — HIGH (ref 10.3–14.5)
SODIUM SERPL-SCNC: 131 MMOL/L — LOW (ref 135–145)
T4 FREE SERPL-MCNC: 1 NG/DL — SIGNIFICANT CHANGE UP (ref 0.9–1.8)
TSH SERPL-MCNC: 8.13 UIU/ML — HIGH (ref 0.27–4.2)
WBC # BLD: 7.63 K/UL — SIGNIFICANT CHANGE UP (ref 3.8–10.5)
WBC # FLD AUTO: 7.63 K/UL — SIGNIFICANT CHANGE UP (ref 3.8–10.5)

## 2020-08-16 PROCEDURE — 71045 X-RAY EXAM CHEST 1 VIEW: CPT | Mod: 26

## 2020-08-16 RX ORDER — INSULIN LISPRO 100/ML
8 VIAL (ML) SUBCUTANEOUS
Refills: 0 | Status: DISCONTINUED | OUTPATIENT
Start: 2020-08-16 | End: 2020-08-17

## 2020-08-16 RX ORDER — HEPARIN SODIUM 5000 [USP'U]/ML
1300 INJECTION INTRAVENOUS; SUBCUTANEOUS
Qty: 25000 | Refills: 0 | Status: DISCONTINUED | OUTPATIENT
Start: 2020-08-16 | End: 2020-08-17

## 2020-08-16 RX ORDER — INSULIN GLARGINE 100 [IU]/ML
18 INJECTION, SOLUTION SUBCUTANEOUS AT BEDTIME
Refills: 0 | Status: DISCONTINUED | OUTPATIENT
Start: 2020-08-16 | End: 2020-08-17

## 2020-08-16 RX ORDER — LANOLIN ALCOHOL/MO/W.PET/CERES
3 CREAM (GRAM) TOPICAL AT BEDTIME
Refills: 0 | Status: DISCONTINUED | OUTPATIENT
Start: 2020-08-16 | End: 2020-08-17

## 2020-08-16 RX ORDER — LEVOTHYROXINE SODIUM 125 MCG
88 TABLET ORAL DAILY
Refills: 0 | Status: DISCONTINUED | OUTPATIENT
Start: 2020-08-16 | End: 2020-08-19

## 2020-08-16 RX ADMIN — Medication 50 MICROGRAM(S): at 05:20

## 2020-08-16 RX ADMIN — HEPARIN SODIUM 11.5 UNIT(S)/HR: 5000 INJECTION INTRAVENOUS; SUBCUTANEOUS at 09:48

## 2020-08-16 RX ADMIN — CHLORHEXIDINE GLUCONATE 1 APPLICATION(S): 213 SOLUTION TOPICAL at 05:18

## 2020-08-16 RX ADMIN — Medication 650 MILLIGRAM(S): at 16:21

## 2020-08-16 RX ADMIN — HEPARIN SODIUM 13 UNIT(S)/HR: 5000 INJECTION INTRAVENOUS; SUBCUTANEOUS at 23:28

## 2020-08-16 RX ADMIN — HEPARIN SODIUM 12.5 UNIT(S)/HR: 5000 INJECTION INTRAVENOUS; SUBCUTANEOUS at 16:27

## 2020-08-16 RX ADMIN — Medication 3 MILLIGRAM(S): at 22:54

## 2020-08-16 RX ADMIN — OXYCODONE HYDROCHLORIDE 5 MILLIGRAM(S): 5 TABLET ORAL at 23:07

## 2020-08-16 RX ADMIN — Medication 50 MILLIGRAM(S): at 05:20

## 2020-08-16 RX ADMIN — Medication 2: at 08:05

## 2020-08-16 RX ADMIN — Medication 50 MILLIGRAM(S): at 21:47

## 2020-08-16 RX ADMIN — Medication 650 MILLIGRAM(S): at 16:51

## 2020-08-16 RX ADMIN — Medication 50 MILLIGRAM(S): at 14:03

## 2020-08-16 RX ADMIN — Medication 81 MILLIGRAM(S): at 14:02

## 2020-08-16 RX ADMIN — INSULIN GLARGINE 18 UNIT(S): 100 INJECTION, SOLUTION SUBCUTANEOUS at 22:54

## 2020-08-16 RX ADMIN — Medication 8 UNIT(S): at 17:13

## 2020-08-16 RX ADMIN — Medication 2: at 17:13

## 2020-08-16 RX ADMIN — ATORVASTATIN CALCIUM 80 MILLIGRAM(S): 80 TABLET, FILM COATED ORAL at 21:47

## 2020-08-16 RX ADMIN — POLYETHYLENE GLYCOL 3350 17 GRAM(S): 17 POWDER, FOR SOLUTION ORAL at 14:03

## 2020-08-16 RX ADMIN — PANTOPRAZOLE SODIUM 40 MILLIGRAM(S): 20 TABLET, DELAYED RELEASE ORAL at 05:20

## 2020-08-16 RX ADMIN — OXYCODONE HYDROCHLORIDE 5 MILLIGRAM(S): 5 TABLET ORAL at 22:29

## 2020-08-16 RX ADMIN — Medication 1: at 11:53

## 2020-08-16 RX ADMIN — Medication 5 UNIT(S): at 08:05

## 2020-08-16 RX ADMIN — Medication 8 UNIT(S): at 11:53

## 2020-08-16 NOTE — CONSULT NOTE ADULT - ASSESSMENT
Assessment  DMT2: 64y Male with DM T2 with hyperglycemia, S/P CABG, on basal bolus insulin, blood sugars running high, no hypoglycemic episode, on dysphagia diet, compliant with low carb diet.  Hypothyroidism: On Synthroid 50 mcg po daily, compliant with Synthroid intake, subclinical now.    CAD: S/P CABG, improving, eating and ambulating.            Joann Adame MD  Cell: [phone]  Office: [phone]

## 2020-08-16 NOTE — PROGRESS NOTE ADULT - PROBLEM SELECTOR PLAN 2
ESRD on HD (MWF)  8/16 NPO after midnight for permacath Monday - Eliquis on hold- will resume monday?

## 2020-08-16 NOTE — PROGRESS NOTE ADULT - ASSESSMENT
ESRD on HD ( MWF) via perma cath  PT from [facility]  s/p cath 8/7  permacath removed on 8/12 prior to sx   s/p CABG  8/13  s/p HD on 8/13, 08/14 with femoral NO UF per CTICU  Planned for permacath on Monday  Consent obtained from son at [phone]  MOnitor BMP    Anemia  Hb at goal for ESRD   Monitor at present    HTN  Bp fluctuating   MOnitor BP  Low salt diet    CKD -BMD  Check PTH  Monitor serum calcium and PO4

## 2020-08-16 NOTE — PROGRESS NOTE ADULT - SUBJECTIVE AND OBJECTIVE BOX
VITAL SIGNS-Telemetry:   66-60-90  Vital Signs Last 24 Hrs  T(C): 36.7 (20 @ 07:07), Max: 36.7 (20 @ 03:18)  T(F): 98 (20 @ 07:07), Max: 98 (20 @ 03:18)  HR: 64 (20 @ 07:07) (64 - 83)  BP: 104/53 (20 @ 07:07) (103/51 - 150/65)  RR: 18 (20 @ 07:07) (18 - 18)  SpO2: 95% (20 @ 07:07) (95% - 98%)         08-15 @ 07:01  -   @ 07:00  --------------------------------------------------------  IN: 931 mL / OUT: 100 mL / NET: 831 mL     @ 07:01  -   @ 12:45  --------------------------------------------------------  IN: 157.5 mL / OUT: 0 mL / NET: 157.5 mL    Daily     Daily Weight in k.2 (16 Aug 2020 08:00)    CAPILLARY BLOOD GLUCOSE  POCT Blood Glucose.: 191 mg/dL (16 Aug 2020 11:48)  POCT Blood Glucose.: 222 mg/dL (16 Aug 2020 07:46)  POCT Blood Glucose.: 236 mg/dL (15 Aug 2020 21:21)  POCT Blood Glucose.: 247 mg/dL (15 Aug 2020 17:25)        Pacing Wires        [  ]   Settings:                                  Isolated  [ x ]         PHYSICAL EXAM:  Neurology: alert and oriented x 3, nonfocal, no gross deficits  CV : S1S2  Sternal Wound :  CDI , Stable  Lungs: CDI  Abdomen: soft, nontender, nondistended, positive bowel sounds, last bowel movement    +bm 8/15     Extremities:     tr. edema, no cafl tenderness/ LLE INC cdi w/ ace wrap    acetaminophen   Tablet .. 650 milliGRAM(s) Oral every 6 hours PRN  aspirin enteric coated 81 milliGRAM(s) Oral daily  atorvastatin 80 milliGRAM(s) Oral at bedtime  chlorhexidine 2% Cloths 1 Application(s) Topical <User Schedule>  dextrose 40% Gel 15 Gram(s) Oral once PRN  dextrose 5%. 1000 milliLiter(s) IV Continuous <Continuous>  dextrose 50% Injectable 12.5 Gram(s) IV Push once  dextrose 50% Injectable 25 Gram(s) IV Push once  dextrose 50% Injectable 25 Gram(s) IV Push once  glucagon  Injectable 1 milliGRAM(s) IntraMuscular once PRN  heparin  Infusion 800 Unit(s)/Hr IV Continuous <Continuous>  insulin glargine Injectable (LANTUS) 18 Unit(s) SubCutaneous at bedtime  insulin lispro (HumaLOG) corrective regimen sliding scale   SubCutaneous three times a day before meals  insulin lispro (HumaLOG) corrective regimen sliding scale   SubCutaneous at bedtime  insulin lispro Injectable (HumaLOG) 8 Unit(s) SubCutaneous three times a day before meals  levothyroxine 50 MICROGram(s) Oral daily  metoprolol tartrate 50 milliGRAM(s) Oral every 8 hours  oxyCODONE    IR 5 milliGRAM(s) Oral every 4 hours PRN  pantoprazole    Tablet 40 milliGRAM(s) Oral before breakfast  polyethylene glycol 3350 17 Gram(s) Oral daily  sodium chloride 0.9%. 1000 milliLiter(s) IV Continuous <Continuous>    Physical Therapy Rec:   Home  [  ]   Home w/ PT  [  ]  Rehab  [  ]  Discussed with Cardiothoracic Team at AM rounds.

## 2020-08-16 NOTE — CONSULT NOTE ADULT - PROBLEM SELECTOR RECOMMENDATION 2
On medications,  no chest pain, stable, monitored and followed up by cardiothoracic team/cardiology team
Will increase Synthroid to 88 mcg po daily, suggest to repeat TFTS in 4-6 weeks FU
WILBER ANGLIN,W,F

## 2020-08-16 NOTE — PROGRESS NOTE ADULT - SUBJECTIVE AND OBJECTIVE BOX
Dr. Barrientos  Office [phone]  Cell [phone]  Karolina HAAS  Cell [phone]      Patient is a 64y old  Male who presents with a chief complaint of sob (16 Aug 2020 12:44)      Patient seen and examined at bedside. No chest pain/sob    VITALS:  T(F): 98.1 (08-16-20 @ 14:00), Max: 98.1 (08-16-20 @ 14:00)  HR: 69 (08-16-20 @ 14:00)  BP: 120/68 (08-16-20 @ 14:00)  RR: 18 (08-16-20 @ 14:00)  SpO2: 96% (08-16-20 @ 14:00)  Wt(kg): --    08-15 @ 07:01  -  08-16 @ 07:00  --------------------------------------------------------  IN: 931 mL / OUT: 100 mL / NET: 831 mL    08-16 @ 07:01  -  08-16 @ 14:32  --------------------------------------------------------  IN: 157.5 mL / OUT: 0 mL / NET: 157.5 mL          PHYSICAL EXAM:  Constitutional: NAD  Neck: No JVD  Respiratory: CTAB, no wheezes, rales or rhonchi  Cardiovascular: S1, S2, RRR  Gastrointestinal: BS+, soft, NT/ND  Extremities: No peripheral edema    Hospital Medications:   MEDICATIONS  (STANDING):  aspirin enteric coated 81 milliGRAM(s) Oral daily  atorvastatin 80 milliGRAM(s) Oral at bedtime  chlorhexidine 2% Cloths 1 Application(s) Topical <User Schedule>  dextrose 5%. 1000 milliLiter(s) (50 mL/Hr) IV Continuous <Continuous>  dextrose 50% Injectable 12.5 Gram(s) IV Push once  dextrose 50% Injectable 25 Gram(s) IV Push once  dextrose 50% Injectable 25 Gram(s) IV Push once  heparin  Infusion 800 Unit(s)/Hr (11.5 mL/Hr) IV Continuous <Continuous>  insulin glargine Injectable (LANTUS) 18 Unit(s) SubCutaneous at bedtime  insulin lispro (HumaLOG) corrective regimen sliding scale   SubCutaneous three times a day before meals  insulin lispro (HumaLOG) corrective regimen sliding scale   SubCutaneous at bedtime  insulin lispro Injectable (HumaLOG) 8 Unit(s) SubCutaneous three times a day before meals  levothyroxine 50 MICROGram(s) Oral daily  metoprolol tartrate 50 milliGRAM(s) Oral every 8 hours  pantoprazole    Tablet 40 milliGRAM(s) Oral before breakfast  polyethylene glycol 3350 17 Gram(s) Oral daily  sodium chloride 0.9%. 1000 milliLiter(s) (10 mL/Hr) IV Continuous <Continuous>      LABS:  08-16    131<L>  |  92<L>  |  76<H>  ----------------------------<  171<H>  4.5   |  21<L>  |  7.41<H>    Ca    8.9      16 Aug 2020 05:09      Creatinine Trend: 7.41 <--, 5.49 <--, 4.80 <--, 5.95 <--, 7.96 <--, 6.16 <--, 9.46 <--                                7.3    7.63  )-----------( 114      ( 16 Aug 2020 05:09 )             23.0     Urine Studies:      Iron 45, TIBC 145, %sat 31      [12-26-19 @ 23:24]  Ferritin 1369      [12-26-19 @ 23:24]  PTH -- (Ca 8.4)      [02-21-20 @ 08:54]   93  HbA1c 6.0      [02-21-20 @ 08:53]  TSH 8.13      [08-16-20 @ 07:53]  Lipid: chol 124, TG 59, HDL 64, LDL 47      [08-08-20 @ 00:47]    HCV 0.24, Nonreact      [08-09-20 @ 19:37]  HIV Nonreact      [08-09-20 @ 19:26]      RADIOLOGY & ADDITIONAL STUDIES:

## 2020-08-16 NOTE — CONSULT NOTE ADULT - SUBJECTIVE AND OBJECTIVE BOX
HPI:  63 yo M w/ PMH HTN, CAD s/p LAD stent 2009, 2019, carotid stenosis, ESRD on HD (MWF Dr Cardenas, [facility]- on transplant list), DM (on IV insulin, controlled, managed by Jamie Corral), CVA c/b status epilepticus requiring intubation and PEG in 12/2019-went to rehab, peg was removed July 2020, dysphagia s/p PEG, atrial fibrillation (on eliquis last dose 8/5/2020), anemia and GI bleed (Feb 2020) who presents today for cardiac angiogram. Patient reports cardiac evaluation for renal transplant- pharm stress test completed on 7/31/2020, revealing abnormal study: there is medium sized, moderate to severe defects in mid to distal anterior, mid to distal anterolateral walls predominantly reversible consistent with infarction with sig.. zackery- infarct ischemia. LVEF 45%. Recommended for cardiac angiogram for further ischemic evaluation     Son [phone] (07 Aug 2020 06:50)  Patient has history of diabetes, on insulin at home, no recent hypoglycemic episodes, no polyuria polydipsia. Patient follows up with PCP for diabetes management.    PAST MEDICAL & SURGICAL HISTORY:  Intubation of airway performed without difficulty: Dec 2019  CVA c/b status epilepticus requiring intubation and PEG in 12/2019-  PEG (percutaneous endoscopic gastrostomy) status: removed July 2020  Anemia  CVA (cerebral vascular accident): 12/13/19 with residual bilateral weakness  Hypothyroidism  CRI (Chronic Renal Insufficiency)  CAD (Coronary Artery Disease): with Stents in 06/2009 and 6/2019  Dyslipidemia  Diabetes  Hypertension  History of insertion of stent into coronary artery bypass graft: 2009 and 2019      FAMILY HISTORY:      Social History:    Outpatient Medications:    MEDICATIONS  (STANDING):  aspirin enteric coated 81 milliGRAM(s) Oral daily  atorvastatin 80 milliGRAM(s) Oral at bedtime  chlorhexidine 2% Cloths 1 Application(s) Topical <User Schedule>  dextrose 5%. 1000 milliLiter(s) (50 mL/Hr) IV Continuous <Continuous>  dextrose 50% Injectable 12.5 Gram(s) IV Push once  dextrose 50% Injectable 25 Gram(s) IV Push once  dextrose 50% Injectable 25 Gram(s) IV Push once  heparin  Infusion 800 Unit(s)/Hr (12.5 mL/Hr) IV Continuous <Continuous>  insulin glargine Injectable (LANTUS) 18 Unit(s) SubCutaneous at bedtime  insulin lispro (HumaLOG) corrective regimen sliding scale   SubCutaneous three times a day before meals  insulin lispro (HumaLOG) corrective regimen sliding scale   SubCutaneous at bedtime  insulin lispro Injectable (HumaLOG) 8 Unit(s) SubCutaneous three times a day before meals  levothyroxine 88 MICROGram(s) Oral daily  metoprolol tartrate 50 milliGRAM(s) Oral every 8 hours  pantoprazole    Tablet 40 milliGRAM(s) Oral before breakfast  polyethylene glycol 3350 17 Gram(s) Oral daily  sodium chloride 0.9%. 1000 milliLiter(s) (10 mL/Hr) IV Continuous <Continuous>    MEDICATIONS  (PRN):  acetaminophen   Tablet .. 650 milliGRAM(s) Oral every 6 hours PRN Mild Pain (1 - 3)  dextrose 40% Gel 15 Gram(s) Oral once PRN Blood Glucose LESS THAN 70 milliGRAM(s)/deciLiter  glucagon  Injectable 1 milliGRAM(s) IntraMuscular once PRN Glucose <70 milliGRAM(s)/deciLiter  oxyCODONE    IR 5 milliGRAM(s) Oral every 4 hours PRN Severe Pain (7 - 10)      Allergies    No Known Allergies    Intolerances      Review of Systems:  Constitutional: No fever, no chills  Eyes: No blurry vision  Neuro: No tremors  HEENT: No pain, no neck swelling  Cardiovascular: No chest pain, no palpitations  Respiratory: Has SOB, no cough  GI: No nausea, vomiting, abdominal pain  : No dysuria  Skin: no rash  MSK: Has leg swelling.  Psych: no depression  Endocrine: no polyuria, polydipsia    ALL OTHER SYSTEMS REVIEWED AND NEGATIVE    UNABLE TO OBTAIN    PHYSICAL EXAM:  VITALS: T(C): 36.7 (08-16-20 @ 14:00)  T(F): 98.1 (08-16-20 @ 14:00), Max: 98.1 (08-16-20 @ 14:00)  HR: 69 (08-16-20 @ 14:00) (64 - 83)  BP: 120/68 (08-16-20 @ 14:00) (103/51 - 150/65)  RR:  (18 - 18)  SpO2:  (95% - 100%)  Wt(kg): --  GENERAL: NAD, well-groomed, well-developed  EYES: No proptosis, no lid lag  HEENT:  Atraumatic, Normocephalic  THYROID: Normal size, no palpable nodules  RESPIRATORY: Clear to auscultation bilaterally; No rales, rhonchi, wheezing  CARDIOVASCULAR: Si S2, No murmurs;  GI: Soft, non distended, normal bowel sounds  SKIN: Dry, intact, No rashes or lesions  MUSCULOSKELETAL: Has BL lower extremity edema.  NEURO:  no tremor, sensation decreased in feet BL,    POCT Blood Glucose.: 218 mg/dL (08-16-20 @ 16:53)  POCT Blood Glucose.: 191 mg/dL (08-16-20 @ 11:48)  POCT Blood Glucose.: 222 mg/dL (08-16-20 @ 07:46)  POCT Blood Glucose.: 236 mg/dL (08-15-20 @ 21:21)  POCT Blood Glucose.: 247 mg/dL (08-15-20 @ 17:25)  POCT Blood Glucose.: 345 mg/dL (08-15-20 @ 12:19)  POCT Blood Glucose.: 142 mg/dL (08-15-20 @ 07:38)  POCT Blood Glucose.: 188 mg/dL (08-15-20 @ 00:07)  POCT Blood Glucose.: 261 mg/dL (08-14-20 @ 21:17)  POCT Blood Glucose.: 186 mg/dL (08-14-20 @ 17:33)  POCT Blood Glucose.: 162 mg/dL (08-14-20 @ 12:21)  POCT Blood Glucose.: 150 mg/dL (08-14-20 @ 12:11)  POCT Blood Glucose.: 125 mg/dL (08-14-20 @ 07:03)  POCT Blood Glucose.: 102 mg/dL (08-14-20 @ 03:28)  POCT Blood Glucose.: 79 mg/dL (08-14-20 @ 01:41)  POCT Blood Glucose.: 77 mg/dL (08-14-20 @ 00:56)  POCT Blood Glucose.: 72 mg/dL (08-14-20 @ 00:27)  POCT Blood Glucose.: 71 mg/dL (08-13-20 @ 23:54)  POCT Blood Glucose.: 72 mg/dL (08-13-20 @ 23:36)  POCT Blood Glucose.: 84 mg/dL (08-13-20 @ 22:53)  POCT Blood Glucose.: 125 mg/dL (08-13-20 @ 22:01)  POCT Blood Glucose.: 173 mg/dL (08-13-20 @ 20:59)  POCT Blood Glucose.: 98 mg/dL (08-13-20 @ 17:54)                            7.3    7.63  )-----------( 114      ( 16 Aug 2020 05:09 )             23.0       08-16    131<L>  |  92<L>  |  76<H>  ----------------------------<  171<H>  4.5   |  21<L>  |  7.41<H>    EGFR if : 8<L>  EGFR if non : 7<L>    Ca    8.9      08-16  Mg     1.7     08-14  Phos  4.5     08-14    TPro  5.8<L>  /  Alb  3.8  /  TBili  0.5  /  DBili  x   /  AST  21  /  ALT  13  /  AlkPhos  42  08-14      Thyroid Function Tests:  08-16 @ 07:53 TSH 8.13 FreeT4 1.0 T3 -- Anti TPO -- Anti Thyroglobulin Ab -- TSI --  08-08 @ 00:47 TSH 1.68 FreeT4 -- T3 75 Anti TPO -- Anti Thyroglobulin Ab -- TSI --          08-08 Chol 124 LDL 47 HDL 64 Trig 59    Radiology:

## 2020-08-17 LAB
ANION GAP SERPL CALC-SCNC: 22 MMOL/L — HIGH (ref 5–17)
APTT BLD: 45 SEC — HIGH (ref 27.5–35.5)
BUN SERPL-MCNC: 96 MG/DL — HIGH (ref 7–23)
CALCIUM SERPL-MCNC: 8.9 MG/DL — SIGNIFICANT CHANGE UP (ref 8.4–10.5)
CHLORIDE SERPL-SCNC: 90 MMOL/L — LOW (ref 96–108)
CO2 SERPL-SCNC: 18 MMOL/L — LOW (ref 22–31)
CREAT SERPL-MCNC: 8.56 MG/DL — HIGH (ref 0.5–1.3)
GLUCOSE BLDC GLUCOMTR-MCNC: 101 MG/DL — HIGH (ref 70–99)
GLUCOSE BLDC GLUCOMTR-MCNC: 112 MG/DL — HIGH (ref 70–99)
GLUCOSE BLDC GLUCOMTR-MCNC: 118 MG/DL — HIGH (ref 70–99)
GLUCOSE BLDC GLUCOMTR-MCNC: 140 MG/DL — HIGH (ref 70–99)
GLUCOSE SERPL-MCNC: 98 MG/DL — SIGNIFICANT CHANGE UP (ref 70–99)
HCT VFR BLD CALC: 24.9 % — LOW (ref 39–50)
HGB BLD-MCNC: 8 G/DL — LOW (ref 13–17)
INR BLD: 1.17 RATIO — HIGH (ref 0.88–1.16)
MCHC RBC-ENTMCNC: 28.5 PG — SIGNIFICANT CHANGE UP (ref 27–34)
MCHC RBC-ENTMCNC: 32.1 GM/DL — SIGNIFICANT CHANGE UP (ref 32–36)
MCV RBC AUTO: 88.6 FL — SIGNIFICANT CHANGE UP (ref 80–100)
NRBC # BLD: 0 /100 WBCS — SIGNIFICANT CHANGE UP (ref 0–0)
PLATELET # BLD AUTO: 164 K/UL — SIGNIFICANT CHANGE UP (ref 150–400)
POTASSIUM SERPL-MCNC: 5 MMOL/L — SIGNIFICANT CHANGE UP (ref 3.5–5.3)
POTASSIUM SERPL-SCNC: 5 MMOL/L — SIGNIFICANT CHANGE UP (ref 3.5–5.3)
PROTHROM AB SERPL-ACNC: 13.8 SEC — HIGH (ref 10.6–13.6)
RBC # BLD: 2.81 M/UL — LOW (ref 4.2–5.8)
RBC # FLD: 17.3 % — HIGH (ref 10.3–14.5)
SODIUM SERPL-SCNC: 130 MMOL/L — LOW (ref 135–145)
WBC # BLD: 7.21 K/UL — SIGNIFICANT CHANGE UP (ref 3.8–10.5)
WBC # FLD AUTO: 7.21 K/UL — SIGNIFICANT CHANGE UP (ref 3.8–10.5)

## 2020-08-17 PROCEDURE — 36556 INSERT NON-TUNNEL CV CATH: CPT

## 2020-08-17 PROCEDURE — 76937 US GUIDE VASCULAR ACCESS: CPT | Mod: 26

## 2020-08-17 PROCEDURE — 99232 SBSQ HOSP IP/OBS MODERATE 35: CPT

## 2020-08-17 PROCEDURE — 77001 FLUOROGUIDE FOR VEIN DEVICE: CPT | Mod: 26

## 2020-08-17 RX ORDER — INSULIN GLARGINE 100 [IU]/ML
15 INJECTION, SOLUTION SUBCUTANEOUS AT BEDTIME
Refills: 0 | Status: DISCONTINUED | OUTPATIENT
Start: 2020-08-17 | End: 2020-08-18

## 2020-08-17 RX ORDER — LANOLIN ALCOHOL/MO/W.PET/CERES
3 CREAM (GRAM) TOPICAL AT BEDTIME
Refills: 0 | Status: DISCONTINUED | OUTPATIENT
Start: 2020-08-17 | End: 2020-08-19

## 2020-08-17 RX ORDER — INSULIN LISPRO 100/ML
6 VIAL (ML) SUBCUTANEOUS
Refills: 0 | Status: DISCONTINUED | OUTPATIENT
Start: 2020-08-17 | End: 2020-08-18

## 2020-08-17 RX ORDER — CHLORHEXIDINE GLUCONATE 213 G/1000ML
1 SOLUTION TOPICAL
Refills: 0 | Status: DISCONTINUED | OUTPATIENT
Start: 2020-08-17 | End: 2020-08-19

## 2020-08-17 RX ORDER — SODIUM CHLORIDE 9 MG/ML
10 INJECTION INTRAMUSCULAR; INTRAVENOUS; SUBCUTANEOUS
Refills: 0 | Status: DISCONTINUED | OUTPATIENT
Start: 2020-08-17 | End: 2020-08-19

## 2020-08-17 RX ORDER — ERYTHROPOIETIN 10000 [IU]/ML
10000 INJECTION, SOLUTION INTRAVENOUS; SUBCUTANEOUS
Refills: 0 | Status: DISCONTINUED | OUTPATIENT
Start: 2020-08-17 | End: 2020-08-19

## 2020-08-17 RX ADMIN — Medication 50 MILLIGRAM(S): at 05:16

## 2020-08-17 RX ADMIN — Medication 50 MILLIGRAM(S): at 22:26

## 2020-08-17 RX ADMIN — OXYCODONE HYDROCHLORIDE 5 MILLIGRAM(S): 5 TABLET ORAL at 22:28

## 2020-08-17 RX ADMIN — POLYETHYLENE GLYCOL 3350 17 GRAM(S): 17 POWDER, FOR SOLUTION ORAL at 13:10

## 2020-08-17 RX ADMIN — INSULIN GLARGINE 15 UNIT(S): 100 INJECTION, SOLUTION SUBCUTANEOUS at 22:25

## 2020-08-17 RX ADMIN — Medication 81 MILLIGRAM(S): at 13:05

## 2020-08-17 RX ADMIN — PANTOPRAZOLE SODIUM 40 MILLIGRAM(S): 20 TABLET, DELAYED RELEASE ORAL at 06:59

## 2020-08-17 RX ADMIN — HEPARIN SODIUM 14.5 UNIT(S)/HR: 5000 INJECTION INTRAVENOUS; SUBCUTANEOUS at 06:46

## 2020-08-17 RX ADMIN — Medication 6 UNIT(S): at 13:05

## 2020-08-17 RX ADMIN — ERYTHROPOIETIN 10000 UNIT(S): 10000 INJECTION, SOLUTION INTRAVENOUS; SUBCUTANEOUS at 16:40

## 2020-08-17 RX ADMIN — Medication 50 MILLIGRAM(S): at 13:06

## 2020-08-17 RX ADMIN — Medication 6 UNIT(S): at 18:37

## 2020-08-17 RX ADMIN — ATORVASTATIN CALCIUM 80 MILLIGRAM(S): 80 TABLET, FILM COATED ORAL at 22:26

## 2020-08-17 RX ADMIN — Medication 88 MICROGRAM(S): at 05:16

## 2020-08-17 RX ADMIN — Medication 3 MILLIGRAM(S): at 22:28

## 2020-08-17 RX ADMIN — OXYCODONE HYDROCHLORIDE 5 MILLIGRAM(S): 5 TABLET ORAL at 22:57

## 2020-08-17 NOTE — PROGRESS NOTE ADULT - PROBLEM SELECTOR PLAN 1
Will decrease Lantus to 15u at bedtime.  Will decrease Humalog to 6u before each meal and continue Humalog correction scale coverage. Will continue monitoring FS and FU.  Patient counseled for compliance with consistent low carb diet and exercise as tolerated outpatient.

## 2020-08-17 NOTE — PROGRESS NOTE ADULT - ASSESSMENT
Assessment  DMT2: 64y Male with DM T2 with hyperglycemia, postop on basal bolus insulin, blood sugars in acceptable range, trending down, no hypoglycemic episodes, NPO for Shiley placement today.  Hypothyroidism: On Synthroid 50 mcg po daily, compliant with Synthroid intake, subclinical, increased dose to synthroid 88mcg po daily.    CAD: S/P CABG, improving, eating and ambulating.            Joann Adame MD  Cell: [phone]  Office: [phone] Assessment  DMT2: 64y Male with DM T2 with hyperglycemia, postop  on basal bolus insulin, blood sugars in acceptable range, trending down, no hypoglycemic episodes, NPO for Shiley placement today.  Hypothyroidism: On Synthroid 50 mcg po daily, compliant with Synthroid intake, subclinical, increased dose to synthroid 88mcg po daily.    CAD: S/P CABG, improving, eating and ambulating.            Joann Adame MD  Cell: [phone]  Office: [phone]

## 2020-08-17 NOTE — PROGRESS NOTE ADULT - SUBJECTIVE AND OBJECTIVE BOX
VITAL SIGNS-Telemetry:  SB/Sr 50-60  Vital Signs Last 24 Hrs  T(C): 36.8 (20 @ 04:30), Max: 36.8 (20 @ 20:08)  T(F): 98.2 (20 @ 04:30), Max: 98.2 (20 @ 20:08)  HR: 59 (20 @ 04:30) (58 - 70)  BP: 107/63 (20 @ 04:30) (107/63 - 135/78)  RR: 18 (20 @ 04:30) (18 - 18)  SpO2: 98% (20 @ 04:30) (96% - 100%)          @ 07:01  -   @ 07:00  --------------------------------------------------------  IN: 545.5 mL / OUT: 750 mL / NET: -204.5 mL    Daily     Daily Weight in k.4 (17 Aug 2020 08:09)    CAPILLARY BLOOD GLUCOSE  POCT Blood Glucose.: 112 mg/dL (17 Aug 2020 08:13)  POCT Blood Glucose.: 96 mg/dL (16 Aug 2020 22:32)  POCT Blood Glucose.: 218 mg/dL (16 Aug 2020 16:53)  POCT Blood Glucose.: 191 mg/dL (16 Aug 2020 11:48)          PHYSICAL EXAM:  Neurology: alert and oriented x 3, nonfocal, no gross deficits  CV : S1S2  Sternal Wound :  CDI , Stable  Lungs: CTA  Abdomen: soft, nontender, nondistended, positive bowel sounds, last bowel movement    +bm     Extremities:     no edema, no calf tenderness    acetaminophen   Tablet .. 650 milliGRAM(s) Oral every 6 hours PRN  aspirin enteric coated 81 milliGRAM(s) Oral daily  atorvastatin 80 milliGRAM(s) Oral at bedtime  chlorhexidine 2% Cloths 1 Application(s) Topical <User Schedule>  dextrose 40% Gel 15 Gram(s) Oral once PRN  dextrose 5%. 1000 milliLiter(s) IV Continuous <Continuous>  dextrose 50% Injectable 12.5 Gram(s) IV Push once  dextrose 50% Injectable 25 Gram(s) IV Push once  dextrose 50% Injectable 25 Gram(s) IV Push once  epoetin cortney-epbx (RETACRIT) Injectable 08142 Unit(s) IV Push <User Schedule>  glucagon  Injectable 1 milliGRAM(s) IntraMuscular once PRN  insulin glargine Injectable (LANTUS) 18 Unit(s) SubCutaneous at bedtime  insulin lispro (HumaLOG) corrective regimen sliding scale   SubCutaneous three times a day before meals  insulin lispro (HumaLOG) corrective regimen sliding scale   SubCutaneous at bedtime  insulin lispro Injectable (HumaLOG) 8 Unit(s) SubCutaneous three times a day before meals  levothyroxine 88 MICROGram(s) Oral daily  melatonin 3 milliGRAM(s) Oral at bedtime PRN  metoprolol tartrate 50 milliGRAM(s) Oral every 8 hours  oxyCODONE    IR 5 milliGRAM(s) Oral every 4 hours PRN  pantoprazole    Tablet 40 milliGRAM(s) Oral before breakfast  polyethylene glycol 3350 17 Gram(s) Oral daily  sodium chloride 0.9%. 1000 milliLiter(s) IV Continuous <Continuous>    Physical Therapy Rec:   Home  [  ]   Home w/ PT  [ x ]  Rehab  [  ]  Discussed with Cardiothoracic Team at AM rounds.

## 2020-08-17 NOTE — CONSULT NOTE ADULT - SUBJECTIVE AND OBJECTIVE BOX
----------------------------------------------------------  Interventional Radiology Brief Consult Note  -----------------------------------------------------------    Reason for Referral: Dialysis catheter placement after CABG    Clinical Summary: 64y Male w/ PMH HTN, CAD s/p LAD stent 2009, 2019, carotid stenosis, ESRD on HD (F Dr Cardenas, [facility]- on transplant list).   CABG 8/13/20 with Dr. Guillory.     Vitals:  T(C): 36.8 (08-17-20 @ 04:30), Max: 36.8 (08-16-20 @ 20:08)  HR: 59 (08-17-20 @ 04:30) (58 - 70)  BP: 107/63 (08-17-20 @ 04:30) (107/63 - 135/78)  RR: 18 (08-17-20 @ 04:30) (18 - 18)  SpO2: 98% (08-17-20 @ 04:30) (96% - 100%)    Labs:           8.0  7.21)-----(164     (08-17-20 @ 05:59)         24.9     130 | 90 | 96  --------------------< 98     (08-17-20 @ 05:59)  5.0 | 18 | 8.56       PT: 13.8<H> 08-17-20 @ 05:59  aPTT: 45.0<H> 08-17-20 @ 05:59   INR: 1.17<H> 08-17-20 @ 05:59      Assessment: 64y Male ESRD on dialysis after CABG 8/13/2020.    Recommendations:  -Non-tunnelled dialysis catheter placement today in IR   - Plan for conversion to tunnelled dialysis catheter later this week   - D/w Dr. Barron ----------------------------------------------------------  Interventional Radiology Brief Consult Note  -----------------------------------------------------------    Reason for Referral: Dialysis catheter placement after CABG    Clinical Summary: 64y Male w/ PMH HTN, CAD s/p LAD stent 2009, 2019, carotid stenosis, ESRD on HD (MWF Dr Cardenas, [place] [facility] [place]- on transplant list).   CABG 8/13/20 with Dr. Guillory. Pt requiring long term dialysis access.    Vitals:  T(C): 36.8 (08-17-20 @ 04:30), Max: 36.8 (08-16-20 @ 20:08)  HR: 59 (08-17-20 @ 04:30) (58 - 70)  BP: 107/63 (08-17-20 @ 04:30) (107/63 - 135/78)  RR: 18 (08-17-20 @ 04:30) (18 - 18)  SpO2: 98% (08-17-20 @ 04:30) (96% - 100%)    Labs:           8.0  7.21)-----(164     (08-17-20 @ 05:59)         24.9     130 | 90 | 96  --------------------< 98     (08-17-20 @ 05:59)  5.0 | 18 | 8.56       PT: 13.8<H> 08-17-20 @ 05:59  aPTT: 45.0<H> 08-17-20 @ 05:59   INR: 1.17<H> 08-17-20 @ 05:59      Assessment: 64y Male ESRD on dialysis after CABG 8/13/2020 with elevated BUN/Cr.    Recommendations:  -Given pt significant elevation of BUN/Cr plan to place non-tunnelled dialysis catheter placement today in IR, which will then be converted to a tunnelled dialysis catheter tomorrow after the pt is dialyzed    - D/w Dr. Barron

## 2020-08-17 NOTE — PROGRESS NOTE ADULT - PROBLEM SELECTOR PLAN 2
ESRD on HD (MWF)  8/16 NPO after midnight for Ellen MOnday NOT permacath d/t   will transfer over to permacath later in week

## 2020-08-17 NOTE — PROGRESS NOTE ADULT - SUBJECTIVE AND OBJECTIVE BOX
Interventional Radiology Pre-Procedure Note    This is a 64y Male with history of ESRD on HD s/p non-tunnelled dialysis catheter placement presenting for conversion to tunnelled dialysis catheter.    Procedure: Tunnelled dialysis catheter placement using imaging guidance.    Diagnosis/Indication: Patient is a 64y old  Male who presents with a chief complaint of sob (17 Aug 2020 10:12)      PAST MEDICAL & SURGICAL HISTORY:  Intubation of airway performed without difficulty: Dec 2019  CVA c/b status epilepticus requiring intubation and PEG in 12/2019-  PEG (percutaneous endoscopic gastrostomy) status: removed July 2020  Anemia  CVA (cerebral vascular accident): 12/13/19 with residual bilateral weakness  Hypothyroidism  CRI (Chronic Renal Insufficiency)  CAD (Coronary Artery Disease): with Stents in 06/2009 and 6/2019  Dyslipidemia  Diabetes  Hypertension  History of insertion of stent into coronary artery bypass graft: 2009 and 2019       CBC Full  -  ( 17 Aug 2020 05:59 )  WBC Count : 7.21 K/uL  RBC Count : 2.81 M/uL  Hemoglobin : 8.0 g/dL  Hematocrit : 24.9 %  Platelet Count - Automated : 164 K/uL  Mean Cell Volume : 88.6 fl  Mean Cell Hemoglobin : 28.5 pg  Mean Cell Hemoglobin Concentration : 32.1 gm/dL  Auto Neutrophil # : x  Auto Lymphocyte # : x  Auto Monocyte # : x  Auto Eosinophil # : x  Auto Basophil # : x  Auto Neutrophil % : x  Auto Lymphocyte % : x  Auto Monocyte % : x  Auto Eosinophil % : x  Auto Basophil % : x    08-17    130<L>  |  90<L>  |  96<H>  ----------------------------<  98  5.0   |  18<L>  |  8.56<H>    Ca    8.9      17 Aug 2020 05:59      PT/INR - ( 17 Aug 2020 05:59 )   PT: 13.8 sec;   INR: 1.17 ratio         PTT - ( 17 Aug 2020 05:59 )  PTT:45.0 sec    Procedure/ risks/ benefits were explained the patients son Chris asked that consent for both the procedure and anesthesia be obtained together in the morning of 8/18/2020 and will be awaiting call.

## 2020-08-17 NOTE — CHART NOTE - NSCHARTNOTEFT_GEN_A_CORE
Pt seen for malnutrition follow-up.    Pt is a 64  year old male with PMH HTN, CAD s/p LAD stent 2009, 2019, carotid stenosis, ESRD on HD, DM, CVA c/b status epilepticus requiring intubation and PEG in 12/2019-went to rehab, peg was removed July 2020, dysphagia s/p PEG, atrial fibrillation (on eliquis last dose 8/5/2020), anemia and GI bleed (Feb 2020) who presents today for cardiac angiogram. Patient reports cardiac evaluation for renal transplant- pharm stress test completed on 7/31/2020, revealing abnormal study. LVEF 45%. Recommended for cardiac angiogram for further ischemic evaluation. Patient is s/p cath which revealed triple vessel disease. Pt is now s/p CABG x 3 on 8/13. Pt with hx of dysphagia, seen by SLP 8/14 for bedside swallow eval with recommendations for Dysphagia 3 with nectar thick liquids. Endo following for glycemic management, insulin adjusted today.    Source: Patient [x]    Family [ ]     other [x] EMR    Diet: Consistent CHO No Snacks, Dysphagia 3 Soft with Nectar Thick Liquids    Patient reports [ ] nausea  [ ] vomiting [ ] diarrhea [ ] constipation  [ ]chewing problems [ ] swallowing issues  [ ] other:     PO intake:  < 50% [ ] 50-75% [ ]   % [x]  other :    Source for PO intake [x] Patient [ ] family [x] chart [ ] staff [ ] other    Enteral/Parenteral Nutrition:  n/a    Current Weight: 157.4 pounds (current, standing, no edema noted).      Pertinent Medications: MEDICATIONS  (STANDING):  aspirin enteric coated 81 milliGRAM(s) Oral daily  atorvastatin 80 milliGRAM(s) Oral at bedtime  chlorhexidine 2% Cloths 1 Application(s) Topical <User Schedule>  chlorhexidine 4% Liquid 1 Application(s) Topical <User Schedule>  dextrose 5%. 1000 milliLiter(s) (50 mL/Hr) IV Continuous <Continuous>  dextrose 50% Injectable 12.5 Gram(s) IV Push once  dextrose 50% Injectable 25 Gram(s) IV Push once  dextrose 50% Injectable 25 Gram(s) IV Push once  epoetin cortney-epbx (RETACRIT) Injectable 83947 Unit(s) IV Push <User Schedule>  insulin glargine Injectable (LANTUS) 15 Unit(s) SubCutaneous at bedtime  insulin lispro (HumaLOG) corrective regimen sliding scale   SubCutaneous three times a day before meals  insulin lispro (HumaLOG) corrective regimen sliding scale   SubCutaneous at bedtime  insulin lispro Injectable (HumaLOG) 6 Unit(s) SubCutaneous three times a day before meals  levothyroxine 88 MICROGram(s) Oral daily  metoprolol tartrate 50 milliGRAM(s) Oral every 8 hours  pantoprazole    Tablet 40 milliGRAM(s) Oral before breakfast  polyethylene glycol 3350 17 Gram(s) Oral daily  sodium chloride 0.9%. 1000 milliLiter(s) (10 mL/Hr) IV Continuous <Continuous>    MEDICATIONS  (PRN):  acetaminophen   Tablet .. 650 milliGRAM(s) Oral every 6 hours PRN Mild Pain (1 - 3)  dextrose 40% Gel 15 Gram(s) Oral once PRN Blood Glucose LESS THAN 70 milliGRAM(s)/deciLiter  glucagon  Injectable 1 milliGRAM(s) IntraMuscular once PRN Glucose <70 milliGRAM(s)/deciLiter  melatonin 3 milliGRAM(s) Oral at bedtime PRN Insomnia  oxyCODONE    IR 5 milliGRAM(s) Oral every 4 hours PRN Severe Pain (7 - 10)  sodium chloride 0.9% lock flush 10 milliLiter(s) IV Push every 1 hour PRN Pre/post blood products, medications, blood draw, and to maintain line patency    Pertinent Labs:  08-17 Na130 mmol/L<L> Glu 98 mg/dL K+ 5.0 mmol/L Cr  8.56 mg/dL<H> BUN 96 mg/dL<H> 08-14 Phos 4.5 mg/dL 08-14 Alb 3.8 g/dL 08-08 Chol 124 mg/dL LDL 47 mg/dL HDL 64 mg/dL Trig 59 mg/dL  CAPILLARY BLOOD GLUCOSE  POCT Blood Glucose.: 118 mg/dL (17 Aug 2020 12:47)  POCT Blood Glucose.: 112 mg/dL (17 Aug 2020 08:13)  POCT Blood Glucose.: 96 mg/dL (16 Aug 2020 22:32)  POCT Blood Glucose.: 218 mg/dL (16 Aug 2020 16:53)      Skin: No pressure injuries noted      Estimated Needs:   [x] no change since previous assessment  [ ] recalculated:       Previous Nutrition Diagnosis: Moderate Malnutrition; Increased nutrient needs         Nutrition Diagnosis is [x] ongoing  [ ] resolved [ ] not applicable          New Nutrition Diagnosis: [ ] not applicable        Interventions:     1)        Monitoring and Evaluation:     [x] PO intake [x] Tolerance to diet prescription [x] weights [x] follow up per protocol    [x] other: RD remains available: Enma Rodney MS, RDN, CDN, CDE, CSOW. #[phone] Pt seen for malnutrition follow-up. Pt reports good po intake/appetite at this time.     Pt is a 64  year old male with PMH HTN, CAD s/p LAD stent 2009, 2019, carotid stenosis, ESRD on HD, DM, CVA c/b status epilepticus requiring intubation and PEG in 12/2019-went to rehab, peg was removed July 2020, dysphagia s/p PEG, atrial fibrillation (on eliquis last dose 8/5/2020), anemia and GI bleed (Feb 2020) who presents today for cardiac angiogram. Patient reports cardiac evaluation for renal transplant- pharm stress test completed on 7/31/2020, revealing abnormal study. LVEF 45%. Recommended for cardiac angiogram for further ischemic evaluation. Patient is s/p cath which revealed triple vessel disease. Pt is now s/p CABG x 3 on 8/13. Pt with hx of dysphagia, seen by SLP 8/14 for bedside swallow eval with recommendations for Dysphagia 3 with nectar thick liquids. Endo following for glycemic management, insulin adjusted today.    Source: Patient [x]    Family [ ]     other [x] EMR    Diet: Consistent CHO No Snacks, Dysphagia 3 Soft with Nectar Thick Liquids    Patient reports [ ] nausea  [ ] vomiting [ ] diarrhea [ ] constipation  [ ]chewing problems [ ] swallowing issues  [x] other: Denies GI distress    PO intake:  < 50% [ ] 50-75% [ ]   % [x]  other :    Source for PO intake [x] Patient [ ] family [x] chart [ ] staff [ ] other    Enteral/Parenteral Nutrition:  n/a    Current Weight: 157.4 pounds (current, standing, no edema noted).      Pertinent Medications: MEDICATIONS  (STANDING):  aspirin enteric coated 81 milliGRAM(s) Oral daily  atorvastatin 80 milliGRAM(s) Oral at bedtime  chlorhexidine 2% Cloths 1 Application(s) Topical <User Schedule>  chlorhexidine 4% Liquid 1 Application(s) Topical <User Schedule>  dextrose 5%. 1000 milliLiter(s) (50 mL/Hr) IV Continuous <Continuous>  dextrose 50% Injectable 12.5 Gram(s) IV Push once  dextrose 50% Injectable 25 Gram(s) IV Push once  dextrose 50% Injectable 25 Gram(s) IV Push once  epoetin cortney-epbx (RETACRIT) Injectable 78632 Unit(s) IV Push <User Schedule>  insulin glargine Injectable (LANTUS) 15 Unit(s) SubCutaneous at bedtime  insulin lispro (HumaLOG) corrective regimen sliding scale   SubCutaneous three times a day before meals  insulin lispro (HumaLOG) corrective regimen sliding scale   SubCutaneous at bedtime  insulin lispro Injectable (HumaLOG) 6 Unit(s) SubCutaneous three times a day before meals  levothyroxine 88 MICROGram(s) Oral daily  metoprolol tartrate 50 milliGRAM(s) Oral every 8 hours  pantoprazole    Tablet 40 milliGRAM(s) Oral before breakfast  polyethylene glycol 3350 17 Gram(s) Oral daily  sodium chloride 0.9%. 1000 milliLiter(s) (10 mL/Hr) IV Continuous <Continuous>    MEDICATIONS  (PRN):  acetaminophen   Tablet .. 650 milliGRAM(s) Oral every 6 hours PRN Mild Pain (1 - 3)  dextrose 40% Gel 15 Gram(s) Oral once PRN Blood Glucose LESS THAN 70 milliGRAM(s)/deciLiter  glucagon  Injectable 1 milliGRAM(s) IntraMuscular once PRN Glucose <70 milliGRAM(s)/deciLiter  melatonin 3 milliGRAM(s) Oral at bedtime PRN Insomnia  oxyCODONE    IR 5 milliGRAM(s) Oral every 4 hours PRN Severe Pain (7 - 10)  sodium chloride 0.9% lock flush 10 milliLiter(s) IV Push every 1 hour PRN Pre/post blood products, medications, blood draw, and to maintain line patency    Pertinent Labs:  08-17 Na130 mmol/L<L> Glu 98 mg/dL K+ 5.0 mmol/L Cr  8.56 mg/dL<H> BUN 96 mg/dL<H> 08-14 Phos 4.5 mg/dL 08-14 Alb 3.8 g/dL 08-08 Chol 124 mg/dL LDL 47 mg/dL HDL 64 mg/dL Trig 59 mg/dL  CAPILLARY BLOOD GLUCOSE  POCT Blood Glucose.: 118 mg/dL (17 Aug 2020 12:47)  POCT Blood Glucose.: 112 mg/dL (17 Aug 2020 08:13)  POCT Blood Glucose.: 96 mg/dL (16 Aug 2020 22:32)  POCT Blood Glucose.: 218 mg/dL (16 Aug 2020 16:53)      Skin: No pressure injuries noted      Estimated Needs:   [x] no change since previous assessment  [ ] recalculated:       Previous Nutrition Diagnosis: Moderate Malnutrition; Increased nutrient needs         Nutrition Diagnosis is [x] ongoing  [ ] resolved [ ] not applicable          New Nutrition Diagnosis: [x] not applicable        Interventions:     1) Recommend add renal to diet order in setting of HD. Continue consistent CHO no snacks, Dysphagia 3 Soft with Nectar Thick Liquids per SLP recommendations.  -Pt requests no thickened juice only thickened water; will honor request  -Endo following for glycemic control       Monitoring and Evaluation:     [x] PO intake [x] Tolerance to diet prescription [x] weights [x] follow up per protocol    [x] other: RD remains available: Enma Rodney MS, RDN, CDN, CDE, CSOWM. #[phone]

## 2020-08-17 NOTE — CONSULT NOTE ADULT - PROVIDER SPECIALTY LIST ADULT
Infectious Disease
Intervent Radiology
Intervent Radiology
Nephrology
CT Surgery
Endocrinology
Dental

## 2020-08-17 NOTE — PROGRESS NOTE ADULT - ASSESSMENT
65 yo M w/ PMH HTN, CAD s/p LAD stent 2009, 2019, carotid stenosis, ESRD on HD (MWF Dr Cardenas, [facility]- on transplant list), DM (on IV insulin, controlled, managed by Jamie Corral), CVA c/b status epilepticus requiring intubation and PEG in 12/2019-went to rehab, peg was removed July 2020, dysphagia s/p PEG, atrial fibrillation (on eliquis last dose 8/5/2020), anemia and GI bleed (Feb 2020) who presents today for cardiac angiogram. Patient reports cardiac evaluation for renal transplant- pharm stress test completed on 7/31/2020, revealing abnormal study: there is medium sized, moderate to severe defects in mid to distal anterior, mid to distal anterolateral walls predominantly reversible consistent with infarction with sig.. zackery- infarct ischemia. LVEF 45%. Recommended for cardiac angiogram for further ischemic evaluation. Patient is s/p cath-TVD-plan for CABG 8/13/20 with Dr. Guillory.     Son [phone] (07 Aug 2020 06:50)    8/12 VSS; AFB x3 Negative - HD today, plan to remove permacath by IR after HD. Plan for OR with Dr. Guillory for CABG tomorrow 8/13/20.     8/13/20 Off-Pump CABG x 3: LIMA-LAD, LSVG-OM1, LSVG-OM2  Urgent post op dialysis for hyperkalemia, permacath d/c preop, temp catheter placed  He was on a dysphagia diet at home preop, s&s consult pending.  Afib preop, on eliquis, resumed, beta blockers started.  Transferred to sdu.  8/15 The patient has  a central line that will need to be removed, hold eliquis.  Will need a permacath , ? monday, will need to contact IR Monday, consult placed in sunrise)  H/o dysphagia, placed on dys 3 diet thickened liquids , speech and swallow following  + strongyloide antibody as per ID, 2 days of iverrectin( potential renal transplant candidate)  8/16 VSS - glucose 144 this am - rounds made w/ Dr. Whitney this am - can isolate Pw's & transfer to floor  8/17 VSS - NPO for permacath - call from IR Dr. Barron.  d/t increased BUn -100 - will place Shiley today & transfer over to Permacath later in the week.  pt is NPO

## 2020-08-17 NOTE — PROGRESS NOTE ADULT - ASSESSMENT
64 M ESRD on HD MWF undergoing transplant work up, CAD s/p stents, IDDMII, hospital course in December 2019 surrounding CVA c/b status epilepticus requiring intubation and PEG placement s/p removal presented for cardiac angiogram as part of pre-transplant work up, reports of outpatient new Quantiferon positive.    Outpatient Dialysis Center PPD Positive   Inpatient [facility] Quantiferon Negative  CT Chest per radiology report with stable opacities when compared to 1/2020  In any case in this patient planned for renal transplant so, I would rule out for MTB  Suspicion is relatively low for active disease given no drastic changes in radiology and symptoms    RECOMMENDATIONS:    #Positive Quantiferon, Abnormal Chest CT  --3x smears negative - isolation discontinued   --Recommend outpatient vs. inpatient pulmonary evaluation - patient planned for renal transplant. Has positive PPD but negative Quantiferons (false negative Quantiferon is possible). He has pulmonary opacities and is [place] incidence [place] with a nephew that had active TB (although exposure to this family member was minimal).  --Followup on AFB sputum culture    #Pre-Renal Transplant, Positive Strongyloides Antibody  --ID clearance for renal transplant pending negative AFB sputum cultures (given positive quantiferon, positive PPD and lung opacities)  --Strongyloides antibody positive - s/p Ivermectin     #Encounter to Vaccinate patient  --Appears to be UTD for pre-transplant vaccines aside for shingrix (which would have to be done as outpatient)    I will continue to follow. Please feel free to contact me with any further questions.    Carlton Hoover M.D.  [facility] Division of Infectious Disease  8AM-5PM: Pager Number [phone]  After Hours (or if no response): Please contact the Infectious Diseases Office at [phone]

## 2020-08-17 NOTE — PROCEDURE NOTE - NSPOSTPRCRAD_GEN_A_CORE
confirmed with VBG
depth of insertion/line adjusted to depth of insertion/central line located in the superior vena cava/no pneumothorax/post-procedure radiography performed

## 2020-08-17 NOTE — PROGRESS NOTE ADULT - PROBLEM SELECTOR PLAN 2
Will continue synthroid 88mcg po daily for now.  Will continue monitoring TFTs and FU.  Suggest to DC on current synthroid dose and repeat TFTs in 4-6 weeks.

## 2020-08-17 NOTE — PROGRESS NOTE ADULT - SUBJECTIVE AND OBJECTIVE BOX
Chief complaint  Patient is a 64y old  Male who presents with a chief complaint of sob (17 Aug 2020 10:12)   Review of systems  Patient in bed, looks comfortable, no hypoglycemic episodes.    Labs and Fingersticks  CAPILLARY BLOOD GLUCOSE      POCT Blood Glucose.: 112 mg/dL (17 Aug 2020 08:13)  POCT Blood Glucose.: 96 mg/dL (16 Aug 2020 22:32)  POCT Blood Glucose.: 218 mg/dL (16 Aug 2020 16:53)      Anion Gap, Serum: 22 <H> (08-17 @ 05:59)  Anion Gap, Serum: 18 <H> (08-16 @ 05:09)      Calcium, Total Serum: 8.9 (08-17 @ 05:59)  Calcium, Total Serum: 8.9 (08-16 @ 05:09)          08-17    130<L>  |  90<L>  |  96<H>  ----------------------------<  98  5.0   |  18<L>  |  8.56<H>    Ca    8.9      17 Aug 2020 05:59                          8.0    7.21  )-----------( 164      ( 17 Aug 2020 05:59 )             24.9     Medications  MEDICATIONS  (STANDING):  aspirin enteric coated 81 milliGRAM(s) Oral daily  atorvastatin 80 milliGRAM(s) Oral at bedtime  chlorhexidine 2% Cloths 1 Application(s) Topical <User Schedule>  chlorhexidine 4% Liquid 1 Application(s) Topical <User Schedule>  dextrose 5%. 1000 milliLiter(s) (50 mL/Hr) IV Continuous <Continuous>  dextrose 50% Injectable 12.5 Gram(s) IV Push once  dextrose 50% Injectable 25 Gram(s) IV Push once  dextrose 50% Injectable 25 Gram(s) IV Push once  epoetin cortney-epbx (RETACRIT) Injectable 46418 Unit(s) IV Push <User Schedule>  insulin glargine Injectable (LANTUS) 15 Unit(s) SubCutaneous at bedtime  insulin lispro (HumaLOG) corrective regimen sliding scale   SubCutaneous three times a day before meals  insulin lispro (HumaLOG) corrective regimen sliding scale   SubCutaneous at bedtime  insulin lispro Injectable (HumaLOG) 6 Unit(s) SubCutaneous three times a day before meals  levothyroxine 88 MICROGram(s) Oral daily  metoprolol tartrate 50 milliGRAM(s) Oral every 8 hours  pantoprazole    Tablet 40 milliGRAM(s) Oral before breakfast  polyethylene glycol 3350 17 Gram(s) Oral daily  sodium chloride 0.9%. 1000 milliLiter(s) (10 mL/Hr) IV Continuous <Continuous>      Physical Exam  General: Patient comfortable in bed  Vital Signs Last 12 Hrs  T(F): 97.9 (08-17-20 @ 10:07), Max: 98.2 (08-17-20 @ 04:30)  HR: 59 (08-17-20 @ 10:22) (59 - 59)  BP: 112/58 (08-17-20 @ 10:22) (107/63 - 126/68)  BP(mean): 78 (08-17-20 @ 04:30) (78 - 78)  RR: 18 (08-17-20 @ 10:22) (18 - 18)  SpO2: 100% (08-17-20 @ 10:22) (97% - 100%)  Neck: No palpable thyroid nodules.  CVS: S1S2, No murmurs  Respiratory: No wheezing, no crepitations  GI: Abdomen soft, bowel sounds positive  Musculoskeletal:  edema lower extremities.   Skin: No skin rashes, no ecchymosis    Diagnostics Chief complaint  Patient is a 64y old  Male who presents with a chief complaint of sob (17 Aug 2020 10:12)   Review of systems  Patient in bed, looks comfortable, no hypoglycemic episodes.    Labs and Fingersticks  CAPILLARY BLOOD GLUCOSE      POCT Blood Glucose.: 112 mg/dL (17 Aug 2020 08:13)  POCT Blood Glucose.: 96 mg/dL (16 Aug 2020 22:32)  POCT Blood Glucose.: 218 mg/dL (16 Aug 2020 16:53)      Anion Gap, Serum: 22 <H> (08-17 @ 05:59)  Anion Gap, Serum: 18 <H> (08-16 @ 05:09)      Calcium, Total Serum: 8.9 (08-17 @ 05:59)  Calcium, Total Serum: 8.9 (08-16 @ 05:09)          08-17    130<L>  |  90<L>  |  96<H>  ----------------------------<  98  5.0   |  18<L>  |  8.56<H>    Ca    8.9      17 Aug 2020 05:59                          8.0    7.21  )-----------( 164      ( 17 Aug 2020 05:59 )             24.9     Medications  MEDICATIONS  (STANDING):  aspirin enteric coated 81 milliGRAM(s) Oral daily  atorvastatin 80 milliGRAM(s) Oral at bedtime  chlorhexidine 2% Cloths 1 Application(s) Topical <User Schedule>  chlorhexidine 4% Liquid 1 Application(s) Topical <User Schedule>  dextrose 5%. 1000 milliLiter(s) (50 mL/Hr) IV Continuous <Continuous>  dextrose 50% Injectable 12.5 Gram(s) IV Push once  dextrose 50% Injectable 25 Gram(s) IV Push once  dextrose 50% Injectable 25 Gram(s) IV Push once  epoetin cortney-epbx (RETACRIT) Injectable 45535 Unit(s) IV Push <User Schedule>  insulin glargine Injectable (LANTUS) 15 Unit(s) SubCutaneous at bedtime  insulin lispro (HumaLOG) corrective regimen sliding scale   SubCutaneous three times a day before meals  insulin lispro (HumaLOG) corrective regimen sliding scale   SubCutaneous at bedtime  insulin lispro Injectable (HumaLOG) 6 Unit(s) SubCutaneous three times a day before meals  levothyroxine 88 MICROGram(s) Oral daily  metoprolol tartrate 50 milliGRAM(s) Oral every 8 hours  pantoprazole    Tablet 40 milliGRAM(s) Oral before breakfast  polyethylene glycol 3350 17 Gram(s) Oral daily  sodium chloride 0.9%. 1000 milliLiter(s) (10 mL/Hr) IV Continuous <Continuous>      Physical Exam  General: Patient comfortable in bed  Vital Signs Last 12 Hrs  T(F): 97.9 (08-17-20 @ 10:07), Max: 98.2 (08-17-20 @ 04:30)  HR: 59 (08-17-20 @ 10:22) (59 - 59)  BP: 112/58 (08-17-20 @ 10:22) (107/63 - 126/68)  BP(mean): 78 (08-17-20 @ 04:30) (78 - 78)  RR: 18 (08-17-20 @ 10:22) (18 - 18)  SpO2: 100% (08-17-20 @ 10:22) (97% - 100%)  Neck: No palpable thyroid nodules.  CVS: S1S2, No murmurs  Respiratory: No wheezing, no crepitations  GI: Abdomen soft, bowel sounds positive  Musculoskeletal:  edema lower extremities.   Skin: No skin rashes, no ecchymosis    Diagnostics

## 2020-08-17 NOTE — PROCEDURE NOTE - NSPROCDETAILS_GEN_ALL_CORE
guidewire recovered/lumen(s) aspirated and flushed/sterile technique, catheter placed/sterile dressing applied/ultrasound guidance
lumen(s) aspirated and flushed/guidewire recovered/sterile dressing applied/sterile technique, catheter placed/ultrasound guidance

## 2020-08-17 NOTE — PROGRESS NOTE ADULT - ASSESSMENT
ESRD on HD ( MWF)  PT from [facility]  s/p cath 8/7  permacath removed on 8/12 prior to sx   s/p CABG  8/13  s/p HD on 8/13, 08/14 with femoral NO UF per CTICU  Planned for permacath on Monday  HD today after perma cath  Consent obtained from son at [phone]  MOnitor BMP    Anemia  IGOR with HD  Monitor at present    HTN  Bp fluctuating   MOnitor BP  Low salt diet    CKD -BMD  Check PTH  Monitor serum calcium and PO4

## 2020-08-17 NOTE — PROCEDURE NOTE - NSPOSTCAREGUIDE_GEN_A_CORE
Care for catheter as per unit/ICU protocols
Verbal/written post procedure instructions were given to patient/caregiver/Instructed patient/caregiver to follow-up with primary care physician/Instructed patient/caregiver regarding signs and symptoms of infection

## 2020-08-17 NOTE — PRE PROCEDURE NOTE - PRE PROCEDURE EVALUATION
------------------------------------------------------------  Interventional Radiology Pre-Procedure Note  ------------------------------------------------------------    Procedure: nontunneled dialysis catheter placement    Indication: 64y Male with ESRD had tunneled catheter removed last week prior to CABG and now presents for new dialysis catheter insertion.    Past Medical History:  Intubation of airway performed without difficulty  PEG (percutaneous endoscopic gastrostomy) status  Anemia  CVA (cerebral vascular accident)  Hypothyroidism  CRI (Chronic Renal Insufficiency)  CAD (Coronary Artery Disease)  CAD (Coronary Artery Disease)  Dyslipidemia  Diabetes  Hypertension      Allergies: No Known Allergies      Medications:    aspirin enteric coated: 81 milliGRAM(s) Oral (08-16-20 @ 14:02)  cefuroxime  IVPB: 100 mL/Hr IV Intermittent (08-15-20 @ 16:36)  heparin  Infusion: 12.5 mL/Hr IV Continuous (08-16-20 @ 16:26)  heparin  Infusion: 14.5 mL/Hr IV Continuous (08-17-20 @ 06:45)  ivermectin: 12 milliGRAM(s) Oral (08-15-20 @ 15:29)  metoprolol tartrate: 50 milliGRAM(s) Oral (08-17-20 @ 05:16)      Vital Signs:   T(F): 97.9 (10:07), Max: 98.2 (20:08)  HR: 59 (10:22)  BP: 112/58 (10:22)  RR: 18 (10:22)  SpO2: 100% (10:22)    Labs:           8.0  7.21)-----(164     (08-17-20 @ 05:59)         24.9     130 | 90 | 96  --------------------< 98     (08-17-20 @ 05:59)  5.0 | 18 | 8.56       PT: 13.8<H> 08-17-20 @ 05:59  aPTT: 45.0<H> 08-17-20 @ 05:59   INR: 1.17<H> 08-17-20 @ 05:59    Consent: The risks, benefits and alternatives of the procedure were discussed with the patient, who verbalized understanding. Signed consent is available in the paper chart.    Procedure Plan: nontunneled dialysis catheter placement    Tima Huffman MD, RPVI  Chief Resident, Interventional Radiology  [facility]: [phone] (p) [phone]  [facility]: [phone]

## 2020-08-17 NOTE — PROGRESS NOTE ADULT - SUBJECTIVE AND OBJECTIVE BOX
Follow Up:  positive PPD    Interval History: afebrile. denies cough or SOB.     REVIEW OF SYSTEMS  [  ] ROS unobtainable because:    [ x ] All other systems negative except as noted below    Constitutional:  [ ] fever [ ] chills  [ ] weight loss  [ ] weakness  Skin:  [ ] rash [ ] phlebitis	  Eyes: [ ] icterus [ ] pain  [ ] discharge	  ENMT: [ ] sore throat  [ ] thrush [ ] ulcers [ ] exudates  Respiratory: [ ] dyspnea [ ] hemoptysis [ ] cough [ ] sputum	  Cardiovascular:  [ ] chest pain [ ] palpitations [ ] edema	  Gastrointestinal:  [ ] nausea [ ] vomiting [ ] diarrhea [ ] constipation [ ] pain	  Genitourinary:  [ ] dysuria [ ] frequency [ ] hematuria [ ] discharge [ ] flank pain  [ ] incontinence  Musculoskeletal:  [ ] myalgias [ ] arthralgias [ ] arthritis  [ ] back pain  Neurological:  [ ] headache [ ] seizures  [ ] confusion/altered mental status    Allergies  No Known Allergies        ANTIMICROBIALS:      OTHER MEDS:  MEDICATIONS  (STANDING):  acetaminophen   Tablet .. 650 every 6 hours PRN  aspirin enteric coated 81 daily  atorvastatin 80 at bedtime  dextrose 40% Gel 15 once PRN  dextrose 50% Injectable 12.5 once  dextrose 50% Injectable 25 once  dextrose 50% Injectable 25 once  epoetin cortney-epbx (RETACRIT) Injectable 89559 <User Schedule>  glucagon  Injectable 1 once PRN  insulin glargine Injectable (LANTUS) 15 at bedtime  insulin lispro (HumaLOG) corrective regimen sliding scale  three times a day before meals  insulin lispro (HumaLOG) corrective regimen sliding scale  at bedtime  insulin lispro Injectable (HumaLOG) 6 three times a day before meals  levothyroxine 88 daily  melatonin 3 at bedtime PRN  metoprolol tartrate 50 every 8 hours  oxyCODONE    IR 5 every 4 hours PRN  pantoprazole    Tablet 40 before breakfast  polyethylene glycol 3350 17 daily      Vital Signs Last 24 Hrs  T(C): 36.6 (17 Aug 2020 15:30), Max: 36.8 (16 Aug 2020 20:08)  T(F): 97.9 (17 Aug 2020 15:30), Max: 98.2 (16 Aug 2020 20:08)  HR: 56 (17 Aug 2020 15:30) (56 - 61)  BP: 119/70 (17 Aug 2020 15:30) (107/63 - 146/78)  BP(mean): 78 (17 Aug 2020 04:30) (78 - 78)  RR: 18 (17 Aug 2020 15:30) (18 - 18)  SpO2: 95% (17 Aug 2020 15:30) (95% - 100%)    PHYSICAL EXAMINATION:  General: Alert and Awake, NAD  HEENT: PERRL, EOMI  Neck: Supple  Cardiac: RRR, No M/R/G  Chest: Sternal surgical site dressing  Resp: CTAB, No Wh/Rh/Ra  Abdomen: NBS, NT/ND, No HSM, No rigidity or guarding  MSK: No LE edema. No Calf tenderness  Skin: No rashes or lesions. Skin is warm and dry to the touch.   Neuro: Alert and Awake. CN 2-12 Grossly intact. Moves all four extremities spontaneously.  Psych: Calm, Pleasant, Cooperative                            8.0    7.21  )-----------( 164      ( 17 Aug 2020 05:59 )             24.9       08-17    130<L>  |  90<L>  |  96<H>  ----------------------------<  98  5.0   |  18<L>  |  8.56<H>    Ca    8.9      17 Aug 2020 05:59            MICROBIOLOGY:  v  .Sputum sputum conical  08-11-20   Culture is being performed.  --  --      .Sputum SPUTUM  08-10-20   Culture is being performed.  --  --      .Sputum Other  08-10-20   Culture is being performed.  --  --        Toxoplasma IgG Screen: <3.0 IU/mL (08-09-20 @ 19:37)          RADIOLOGY:    <The imaging below has been reviewed and visualized by me independently. Findings as detailed in report below>    EXAM:  XR CHEST PORTABLE ROUTINE 1V                        PROCEDURE DATE:  08/16/2020    Impression: Small left pleural effusion with associated atelectasis, unchanged.

## 2020-08-18 ENCOUNTER — TRANSCRIPTION ENCOUNTER (OUTPATIENT)
Age: 65
End: 2020-08-18

## 2020-08-18 LAB
ANION GAP SERPL CALC-SCNC: 19 MMOL/L — HIGH (ref 5–17)
APTT BLD: 31 SEC — SIGNIFICANT CHANGE UP (ref 27.5–35.5)
BUN SERPL-MCNC: 65 MG/DL — HIGH (ref 7–23)
CALCIUM SERPL-MCNC: 9.1 MG/DL — SIGNIFICANT CHANGE UP (ref 8.4–10.5)
CHLORIDE SERPL-SCNC: 90 MMOL/L — LOW (ref 96–108)
CO2 SERPL-SCNC: 21 MMOL/L — LOW (ref 22–31)
CREAT SERPL-MCNC: 6.17 MG/DL — HIGH (ref 0.5–1.3)
GLUCOSE BLDC GLUCOMTR-MCNC: 100 MG/DL — HIGH (ref 70–99)
GLUCOSE BLDC GLUCOMTR-MCNC: 128 MG/DL — HIGH (ref 70–99)
GLUCOSE BLDC GLUCOMTR-MCNC: 74 MG/DL — SIGNIFICANT CHANGE UP (ref 70–99)
GLUCOSE BLDC GLUCOMTR-MCNC: 88 MG/DL — SIGNIFICANT CHANGE UP (ref 70–99)
GLUCOSE SERPL-MCNC: 117 MG/DL — HIGH (ref 70–99)
HCT VFR BLD CALC: 25.7 % — LOW (ref 39–50)
HGB BLD-MCNC: 8.4 G/DL — LOW (ref 13–17)
INR BLD: 1.26 RATIO — HIGH (ref 0.88–1.16)
MCHC RBC-ENTMCNC: 28.5 PG — SIGNIFICANT CHANGE UP (ref 27–34)
MCHC RBC-ENTMCNC: 32.7 GM/DL — SIGNIFICANT CHANGE UP (ref 32–36)
MCV RBC AUTO: 87.1 FL — SIGNIFICANT CHANGE UP (ref 80–100)
NRBC # BLD: 0 /100 WBCS — SIGNIFICANT CHANGE UP (ref 0–0)
PLATELET # BLD AUTO: 202 K/UL — SIGNIFICANT CHANGE UP (ref 150–400)
POTASSIUM SERPL-MCNC: 5.1 MMOL/L — SIGNIFICANT CHANGE UP (ref 3.5–5.3)
POTASSIUM SERPL-SCNC: 5.1 MMOL/L — SIGNIFICANT CHANGE UP (ref 3.5–5.3)
PROTHROM AB SERPL-ACNC: 14.8 SEC — HIGH (ref 10.6–13.6)
RBC # BLD: 2.95 M/UL — LOW (ref 4.2–5.8)
RBC # FLD: 17.4 % — HIGH (ref 10.3–14.5)
SODIUM SERPL-SCNC: 130 MMOL/L — LOW (ref 135–145)
WBC # BLD: 5.73 K/UL — SIGNIFICANT CHANGE UP (ref 3.8–10.5)
WBC # FLD AUTO: 5.73 K/UL — SIGNIFICANT CHANGE UP (ref 3.8–10.5)

## 2020-08-18 PROCEDURE — 77001 FLUOROGUIDE FOR VEIN DEVICE: CPT | Mod: 26

## 2020-08-18 PROCEDURE — 36558 INSERT TUNNELED CV CATH: CPT

## 2020-08-18 RX ORDER — INSULIN GLARGINE 100 [IU]/ML
12 INJECTION, SOLUTION SUBCUTANEOUS AT BEDTIME
Refills: 0 | Status: DISCONTINUED | OUTPATIENT
Start: 2020-08-18 | End: 2020-08-19

## 2020-08-18 RX ORDER — INSULIN LISPRO 100/ML
5 VIAL (ML) SUBCUTANEOUS
Refills: 0 | Status: DISCONTINUED | OUTPATIENT
Start: 2020-08-18 | End: 2020-08-19

## 2020-08-18 RX ADMIN — OXYCODONE HYDROCHLORIDE 5 MILLIGRAM(S): 5 TABLET ORAL at 22:12

## 2020-08-18 RX ADMIN — Medication 50 MILLIGRAM(S): at 05:21

## 2020-08-18 RX ADMIN — Medication 50 MILLIGRAM(S): at 14:10

## 2020-08-18 RX ADMIN — ATORVASTATIN CALCIUM 80 MILLIGRAM(S): 80 TABLET, FILM COATED ORAL at 21:34

## 2020-08-18 RX ADMIN — POLYETHYLENE GLYCOL 3350 17 GRAM(S): 17 POWDER, FOR SOLUTION ORAL at 14:10

## 2020-08-18 RX ADMIN — INSULIN GLARGINE 12 UNIT(S): 100 INJECTION, SOLUTION SUBCUTANEOUS at 21:34

## 2020-08-18 RX ADMIN — CHLORHEXIDINE GLUCONATE 1 APPLICATION(S): 213 SOLUTION TOPICAL at 08:01

## 2020-08-18 RX ADMIN — Medication 81 MILLIGRAM(S): at 14:10

## 2020-08-18 RX ADMIN — Medication 3 MILLIGRAM(S): at 21:34

## 2020-08-18 RX ADMIN — Medication 88 MICROGRAM(S): at 05:23

## 2020-08-18 RX ADMIN — Medication 5 UNIT(S): at 17:12

## 2020-08-18 RX ADMIN — OXYCODONE HYDROCHLORIDE 5 MILLIGRAM(S): 5 TABLET ORAL at 21:35

## 2020-08-18 RX ADMIN — Medication 50 MILLIGRAM(S): at 21:35

## 2020-08-18 NOTE — PROGRESS NOTE ADULT - ASSESSMENT
Assessment  DMT2: 64y Male with DM T2 with hyperglycemia, postop on basal bolus insulin, decreased dose yesterday, Humalog being held 2/2 NPO status, blood sugars within acceptable range/trending down today, no hypoglycemic episodes, NPO due to bleeding at catheter site.  Hypothyroidism: On Synthroid 50 mcg po daily, compliant with Synthroid intake, subclinical, increased dose to synthroid 88mcg po daily.    CAD: S/P CABG, on meds, monitored.          Joann Adame MD  Cell: [phone]  Office: [phone] Assessment  DMT2: 64y Male with DM T2 with hyperglycemia, postop on basal bolus insulin, decreased dose yesterday, Humalog being held 2/2 NPO status, blood sugars trending down today, no hypoglycemic episodes,  NPO due to bleeding at catheter site.  Hypothyroidism: On Synthroid 50 mcg po daily, compliant with Synthroid intake, subclinical, increased dose to synthroid 88mcg po daily.    CAD: S/P CABG, on meds, monitored.          Joann Adame MD  Cell: [phone]  Office: [phone]

## 2020-08-18 NOTE — PROGRESS NOTE ADULT - PROBLEM SELECTOR PLAN 1
Will continue current insulin regimen for now. Will continue monitoring FS and FU.  Patient counseled for compliance with consistent low carb diet and exercise as tolerated outpatient. Will decrease Lantus to 12 units at bed time.  Will decrease Humalog to 5 units before each meal in addition to Humalog correction scale coverage.  Patient counseled for compliance with consistent low carb diet.

## 2020-08-18 NOTE — CHART NOTE - NSCHARTNOTEFT_GEN_A_CORE
IR was requested to evaluate the Left chest tunnelled HD catheter site for bleeding around the site.  The dressing was slightly saturated with old blood but there was no active bleeding seen.  Hemostatic dressing was applied around the site and manual compression was applied.  Hemostasis remained after the dressing was changed.  Pt was positioned with the head of the bed at 45 degrees and should have the head 35-45 degrees for the next 2 hours.  Covering RN was notified of the above.      SHAR Winkler  [facility] [id number]  Ext [phone]

## 2020-08-18 NOTE — PROGRESS NOTE ADULT - PROBLEM SELECTOR PLAN 2
ESRD on HD (MWF)  8/18 Permacath placed in IR this am   bleeding @ permacath site - pressure applied- IR PA up to see pt.  will monitor closely

## 2020-08-18 NOTE — CHART NOTE - NSCHARTNOTEFT_GEN_A_CORE
IR requested to evaluate the left tunnelled HD catheter site again for recurrent bleeding.  Flowable D-STAT hemostatic agent was injected into the catheter tunnel where the patient was bleeding from.  A new dressing applied.  Hemostasis was achieved with manual compression combined with injectable hemostatic agent.  Case d/w primary team TAMMY Berrios.  Case d/w Dr. GIULIA Galvez.    Fidel Martinez, RPA-C  [facility] [id number]  Ext [phone]

## 2020-08-18 NOTE — PROGRESS NOTE ADULT - ASSESSMENT
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 Patient reports cardiac evaluation for renal transplant- pharm stress test completed on 7/31/2020, revealing abnormal study: there is medium sized, moderate to severe defects in mid to distal anterior, mid to distal anterolateral walls predominantly reversible consistent with infarction with sig.. zackery- infarct ischemia. LVEF 45%. Recommended for cardiac angiogram for further ischemic evaluation. Patient is s/p cath-TVD-plan for CABG 8/13/20 with Dr. Guillory.     Son [phone] (07 Aug 2020 06:50)    8/12 VSS; AFB x3 Negative - HD today, plan to remove permacath by IR after HD. Plan for OR with Dr. Guillory for CABG tomorrow 8/13/20.     8/13/20 Off-Pump CABG x 3: LIMA-LAD, LSVG-OM1, LSVG-OM2  Urgent post op dialysis for hyperkalemia, permacath d/c preop, temp catheter placed  He was on a dysphagia diet at home preop, s&s consult pending.  Afib preop, on eliquis, resumed, beta blockers started.  Transferred to sdu.  8/15 The patient has  a central line that will need to be removed, hold eliquis.  Will need a permacath , ? monday, will need to contact IR Monday, consult placed in sunrise)  H/o dysphagia, placed on dys 3 diet thickened liquids , speech and swallow following  + strongyloide antibody as per ID, 2 days of iverrectin( potential renal transplant candidate)  8/16 VSS - glucose 144 this am - rounds made w/ Dr. Whitney this am - can isolate Pw's & transfer to floor  8/17 VSS - NPO for permacath - call from IR Dr. Barron.  d/t increased BUn -100 - will place Shiley today & transfer over to Permacath later in the week.  pt is NPO   8/18 VSS- permacath placed in IR this am.  Bleeding from site on arrival to floor . IR PA notified - pressure applied - will monitor - may keep pt. one more day to monitor permacath site.

## 2020-08-18 NOTE — DISCHARGE NOTE NURSING/CASE MANAGEMENT/SOCIAL WORK - PATIENT PORTAL LINK FT
You can access the FollowMyHealth Patient Portal offered by [facility] by registering at the following website: [email or web address]. By joining [employer]’s FollowMyHealth portal, you will also be able to view your health information using other applications (apps) compatible with our system.

## 2020-08-18 NOTE — PROGRESS NOTE ADULT - SUBJECTIVE AND OBJECTIVE BOX
Chief complaint  Patient is a 64y old  Male who presents with a chief complaint of sob (18 Aug 2020 11:59)   Review of systems  Patient in bed, NPO, looks comfortable, no hypoglycemic episodes.    Labs and Fingersticks  CAPILLARY BLOOD GLUCOSE      POCT Blood Glucose.: 74 mg/dL (18 Aug 2020 11:43)  POCT Blood Glucose.: 88 mg/dL (18 Aug 2020 07:56)  POCT Blood Glucose.: 140 mg/dL (17 Aug 2020 21:29)  POCT Blood Glucose.: 101 mg/dL (17 Aug 2020 18:20)  POCT Blood Glucose.: 118 mg/dL (17 Aug 2020 12:47)      Anion Gap, Serum: 19 <H> (08-18 @ 05:26)  Anion Gap, Serum: 22 <H> (08-17 @ 05:59)      Calcium, Total Serum: 9.1 (08-18 @ 05:26)  Calcium, Total Serum: 8.9 (08-17 @ 05:59)          08-18    130<L>  |  90<L>  |  65<H>  ----------------------------<  117<H>  5.1   |  21<L>  |  6.17<H>    Ca    9.1      18 Aug 2020 05:26                          8.4    5.73  )-----------( 202      ( 18 Aug 2020 05:26 )             25.7     Medications  MEDICATIONS  (STANDING):  aspirin enteric coated 81 milliGRAM(s) Oral daily  atorvastatin 80 milliGRAM(s) Oral at bedtime  chlorhexidine 2% Cloths 1 Application(s) Topical <User Schedule>  chlorhexidine 4% Liquid 1 Application(s) Topical <User Schedule>  dextrose 5%. 1000 milliLiter(s) (50 mL/Hr) IV Continuous <Continuous>  dextrose 50% Injectable 12.5 Gram(s) IV Push once  dextrose 50% Injectable 25 Gram(s) IV Push once  dextrose 50% Injectable 25 Gram(s) IV Push once  epoetin cortney-epbx (RETACRIT) Injectable 53569 Unit(s) IV Push <User Schedule>  insulin glargine Injectable (LANTUS) 15 Unit(s) SubCutaneous at bedtime  insulin lispro (HumaLOG) corrective regimen sliding scale   SubCutaneous three times a day before meals  insulin lispro (HumaLOG) corrective regimen sliding scale   SubCutaneous at bedtime  insulin lispro Injectable (HumaLOG) 6 Unit(s) SubCutaneous three times a day before meals  levothyroxine 88 MICROGram(s) Oral daily  melatonin 3 milliGRAM(s) Oral at bedtime  metoprolol tartrate 50 milliGRAM(s) Oral every 8 hours  pantoprazole    Tablet 40 milliGRAM(s) Oral before breakfast  polyethylene glycol 3350 17 Gram(s) Oral daily  sodium chloride 0.9%. 1000 milliLiter(s) (10 mL/Hr) IV Continuous <Continuous>      Physical Exam  General: Patient comfortable in bed  Vital Signs Last 12 Hrs  T(F): 98.6 (08-18-20 @ 12:00), Max: 98.8 (08-18-20 @ 11:07)  HR: 76 (08-18-20 @ 12:00) (57 - 82)  BP: 126/76 (08-18-20 @ 11:40) (111/60 - 132/64)  BP(mean): 75 (08-18-20 @ 10:30) (72 - 78)  RR: 18 (08-18-20 @ 11:40) (16 - 19)  SpO2: 95% (08-18-20 @ 11:40) (93% - 99%)  Neck: No palpable thyroid nodules.  CVS: S1S2, No murmurs  Respiratory: No wheezing, no crepitations  GI: Abdomen soft, bowel sounds positive  Musculoskeletal:  edema lower extremities.   Skin: No skin rashes, no ecchymosis    Diagnostics Chief complaint  Patient is a 64y old  Male who presents with a chief complaint of sob (18 Aug 2020 11:59)   Review of systems  Patient in bed, NPO, looks comfortable, no hypoglycemic episodes.    Labs and Fingersticks  CAPILLARY BLOOD GLUCOSE      POCT Blood Glucose.: 74 mg/dL (18 Aug 2020 11:43)  POCT Blood Glucose.: 88 mg/dL (18 Aug 2020 07:56)  POCT Blood Glucose.: 140 mg/dL (17 Aug 2020 21:29)  POCT Blood Glucose.: 101 mg/dL (17 Aug 2020 18:20)  POCT Blood Glucose.: 118 mg/dL (17 Aug 2020 12:47)      Anion Gap, Serum: 19 <H> (08-18 @ 05:26)  Anion Gap, Serum: 22 <H> (08-17 @ 05:59)      Calcium, Total Serum: 9.1 (08-18 @ 05:26)  Calcium, Total Serum: 8.9 (08-17 @ 05:59)          08-18    130<L>  |  90<L>  |  65<H>  ----------------------------<  117<H>  5.1   |  21<L>  |  6.17<H>    Ca    9.1      18 Aug 2020 05:26                          8.4    5.73  )-----------( 202      ( 18 Aug 2020 05:26 )             25.7     Medications  MEDICATIONS  (STANDING):  aspirin enteric coated 81 milliGRAM(s) Oral daily  atorvastatin 80 milliGRAM(s) Oral at bedtime  chlorhexidine 2% Cloths 1 Application(s) Topical <User Schedule>  chlorhexidine 4% Liquid 1 Application(s) Topical <User Schedule>  dextrose 5%. 1000 milliLiter(s) (50 mL/Hr) IV Continuous <Continuous>  dextrose 50% Injectable 12.5 Gram(s) IV Push once  dextrose 50% Injectable 25 Gram(s) IV Push once  dextrose 50% Injectable 25 Gram(s) IV Push once  epoetin cortney-epbx (RETACRIT) Injectable 22776 Unit(s) IV Push <User Schedule>  insulin glargine Injectable (LANTUS) 15 Unit(s) SubCutaneous at bedtime  insulin lispro (HumaLOG) corrective regimen sliding scale   SubCutaneous three times a day before meals  insulin lispro (HumaLOG) corrective regimen sliding scale   SubCutaneous at bedtime  insulin lispro Injectable (HumaLOG) 6 Unit(s) SubCutaneous three times a day before meals  levothyroxine 88 MICROGram(s) Oral daily  melatonin 3 milliGRAM(s) Oral at bedtime  metoprolol tartrate 50 milliGRAM(s) Oral every 8 hours  pantoprazole    Tablet 40 milliGRAM(s) Oral before breakfast  polyethylene glycol 3350 17 Gram(s) Oral daily  sodium chloride 0.9%. 1000 milliLiter(s) (10 mL/Hr) IV Continuous <Continuous>      Physical Exam  General: Patient comfortable in bed  Vital Signs Last 12 Hrs  T(F): 98.6 (08-18-20 @ 12:00), Max: 98.8 (08-18-20 @ 11:07)  HR: 76 (08-18-20 @ 12:00) (57 - 82)  BP: 126/76 (08-18-20 @ 11:40) (111/60 - 132/64)  BP(mean): 75 (08-18-20 @ 10:30) (72 - 78)  RR: 18 (08-18-20 @ 11:40) (16 - 19)  SpO2: 95% (08-18-20 @ 11:40) (93% - 99%)  Neck: No palpable thyroid nodules.  CVS: S1S2, No murmurs  Respiratory: No wheezing, no crepitations  GI: Abdomen soft, bowel sounds positive  Musculoskeletal:  edema lower extremities.   Skin: No skin rashes, no ecchymosis    Diagnostics

## 2020-08-18 NOTE — PROGRESS NOTE ADULT - SUBJECTIVE AND OBJECTIVE BOX
VITAL SIGNS-Telemetry:  AFib   Vital Signs Last 24 Hrs  T(C): 37.1 (20 @ 11:07), Max: 37.1 (20 @ 19:42)  T(F): 98.8 (20 @ 11:07), Max: 98.8 (20 @ 19:42)  HR: 82 (20 @ 11:23) (56 - 97)  BP: 129/66 (20 @ 11:23) (111/60 - 160/80)  RR: 19 (20 @ 11:23) (16 - 19)  SpO2: 96% (20 @ 11:23) (93% - 99%)          @ 07:01  -   @ 07:00  --------------------------------------------------------  IN: 240 mL / OUT: 1000 mL / NET: -760 mL     @ 07:01  -   @ 12:03  --------------------------------------------------------  IN: 0 mL / OUT: 50 mL / NET: -50 mL    Daily Height in cm: 180.34 (18 Aug 2020 09:55)    Daily Weight in k.2 (18 Aug 2020 08:07)    CAPILLARY BLOOD GLUCOSE  POCT Blood Glucose.: 74 mg/dL (18 Aug 2020 11:43)  POCT Blood Glucose.: 88 mg/dL (18 Aug 2020 07:56)  POCT Blood Glucose.: 140 mg/dL (17 Aug 2020 21:29)  POCT Blood Glucose.: 101 mg/dL (17 Aug 2020 18:20)  POCT Blood Glucose.: 118 mg/dL (17 Aug 2020 12:47)          PHYSICAL EXAM:  Neurology: alert and oriented x 3, nonfocal, no gross deficits  CV : S1S2  Sternal Wound :  CDI , Stable  Lungs: CTA  Abdomen: soft, nontender, nondistended, positive bowel sounds, last bowel movement         Extremities:     no edema no calf tenderness  RIJ permacath - oozing -    acetaminophen   Tablet .. 650 milliGRAM(s) Oral every 6 hours PRN  aspirin enteric coated 81 milliGRAM(s) Oral daily  atorvastatin 80 milliGRAM(s) Oral at bedtime  chlorhexidine 2% Cloths 1 Application(s) Topical <User Schedule>  chlorhexidine 4% Liquid 1 Application(s) Topical <User Schedule>  dextrose 40% Gel 15 Gram(s) Oral once PRN  dextrose 5%. 1000 milliLiter(s) IV Continuous <Continuous>  dextrose 50% Injectable 12.5 Gram(s) IV Push once  dextrose 50% Injectable 25 Gram(s) IV Push once  dextrose 50% Injectable 25 Gram(s) IV Push once  epoetin cortney-epbx (RETACRIT) Injectable 84697 Unit(s) IV Push <User Schedule>  glucagon  Injectable 1 milliGRAM(s) IntraMuscular once PRN  insulin glargine Injectable (LANTUS) 15 Unit(s) SubCutaneous at bedtime  insulin lispro (HumaLOG) corrective regimen sliding scale   SubCutaneous three times a day before meals  insulin lispro (HumaLOG) corrective regimen sliding scale   SubCutaneous at bedtime  insulin lispro Injectable (HumaLOG) 6 Unit(s) SubCutaneous three times a day before meals  levothyroxine 88 MICROGram(s) Oral daily  melatonin 3 milliGRAM(s) Oral at bedtime  metoprolol tartrate 50 milliGRAM(s) Oral every 8 hours  oxyCODONE    IR 5 milliGRAM(s) Oral every 4 hours PRN  pantoprazole    Tablet 40 milliGRAM(s) Oral before breakfast  polyethylene glycol 3350 17 Gram(s) Oral daily  sodium chloride 0.9% lock flush 10 milliLiter(s) IV Push every 1 hour PRN  sodium chloride 0.9%. 1000 milliLiter(s) IV Continuous <Continuous>    Physical Therapy Rec:   Home  [ x ]   Home w/ PT  [  ]  Rehab  [  ]  Discussed with Cardiothoracic Team at AM rounds.

## 2020-08-18 NOTE — PROGRESS NOTE ADULT - ASSESSMENT
ESRD on HD ( MWF)  PT from [facility]  s/p cath 8/7  permacath removed on 8/12 prior to sx   s/p CABG  8/13  s/p perma cath on 8/18  s.p HD On 8/17  Plan for HD tomorrow  Consent obtained from son at [phone]  MOnitor BMP    Anemia  IGOR with HD  Monitor at present    HTN  Bp fluctuating   MOnitor BP  Low salt diet    CKD -BMD  Check PTH  Monitor serum calcium and PO4

## 2020-08-18 NOTE — PROGRESS NOTE ADULT - SUBJECTIVE AND OBJECTIVE BOX
[facility]   MD NINA MASON MD RUORU WONG, PA    TEL:  OFFICE: [phone]  DR HSU CELL: [phone]  RAS MORELOS CELL: [phone]  DR. MARQUEZ CELL: [phone]  DR. ALEX CELL: [phone]    FROM 5 PM - 7 AM PLEASE CALL ANSWERING SERVICE: [phone]    RENAL FOLLOW UP NOTE  --------------------------------------------------------------------------------  HPI:      Pt seen and examined at bedside.   s/p perma cath     PAST HISTORY  --------------------------------------------------------------------------------  No significant changes to PMH, PSH, FHx, SHx, unless otherwise noted    ALLERGIES & MEDICATIONS  --------------------------------------------------------------------------------  Allergies    No Known Allergies    Intolerances      Standing Inpatient Medications  aspirin enteric coated 81 milliGRAM(s) Oral daily  atorvastatin 80 milliGRAM(s) Oral at bedtime  chlorhexidine 2% Cloths 1 Application(s) Topical <User Schedule>  chlorhexidine 4% Liquid 1 Application(s) Topical <User Schedule>  dextrose 5%. 1000 milliLiter(s) IV Continuous <Continuous>  dextrose 50% Injectable 12.5 Gram(s) IV Push once  dextrose 50% Injectable 25 Gram(s) IV Push once  dextrose 50% Injectable 25 Gram(s) IV Push once  epoetin cortney-epbx (RETACRIT) Injectable 54543 Unit(s) IV Push <User Schedule>  insulin glargine Injectable (LANTUS) 15 Unit(s) SubCutaneous at bedtime  insulin lispro (HumaLOG) corrective regimen sliding scale   SubCutaneous three times a day before meals  insulin lispro (HumaLOG) corrective regimen sliding scale   SubCutaneous at bedtime  insulin lispro Injectable (HumaLOG) 6 Unit(s) SubCutaneous three times a day before meals  levothyroxine 88 MICROGram(s) Oral daily  melatonin 3 milliGRAM(s) Oral at bedtime  metoprolol tartrate 50 milliGRAM(s) Oral every 8 hours  pantoprazole    Tablet 40 milliGRAM(s) Oral before breakfast  polyethylene glycol 3350 17 Gram(s) Oral daily  sodium chloride 0.9%. 1000 milliLiter(s) IV Continuous <Continuous>    PRN Inpatient Medications  acetaminophen   Tablet .. 650 milliGRAM(s) Oral every 6 hours PRN  dextrose 40% Gel 15 Gram(s) Oral once PRN  glucagon  Injectable 1 milliGRAM(s) IntraMuscular once PRN  oxyCODONE    IR 5 milliGRAM(s) Oral every 4 hours PRN  sodium chloride 0.9% lock flush 10 milliLiter(s) IV Push every 1 hour PRN      REVIEW OF SYSTEMS  --------------------------------------------------------------------------------  General: no fever    MSK: no edema     VITALS/PHYSICAL EXAM  --------------------------------------------------------------------------------  T(C): 37.1 (08-18-20 @ 11:07), Max: 37.1 (08-17-20 @ 19:42)  HR: 82 (08-18-20 @ 11:23) (56 - 97)  BP: 129/66 (08-18-20 @ 11:23) (111/60 - 160/80)  RR: 19 (08-18-20 @ 11:23) (16 - 19)  SpO2: 96% (08-18-20 @ 11:23) (93% - 99%)  Wt(kg): --  Height (cm): 180.34 (08-18-20 @ 09:55)  Weight (kg): 71.4 (08-18-20 @ 09:55)  BMI (kg/m2): 22 (08-18-20 @ 09:55)  BSA (m2): 1.9 (08-18-20 @ 09:55)      08-17-20 @ 07:01  -  08-18-20 @ 07:00  --------------------------------------------------------  IN: 240 mL / OUT: 1000 mL / NET: -760 mL    08-18-20 @ 07:01  -  08-18-20 @ 11:42  --------------------------------------------------------  IN: 0 mL / OUT: 50 mL / NET: -50 mL      Physical Exam:  	Gen: NAD  	HEENT: MMM  	Pulm: CTA B/L  	CV: S1S2  	Abd: Soft, +BS  	Ext: No LE edema B/L                	Skin: Warm and Dry   	Vascular access: permacath          FABIÁN tucker  LABS/STUDIES  --------------------------------------------------------------------------------              8.4    5.73  >-----------<  202      [08-18-20 @ 05:26]              25.7     130  |  90  |  65  ----------------------------<  117      [08-18-20 @ 05:26]  5.1   |  21  |  6.17        Ca     9.1     [08-18-20 @ 05:26]      PT/INR: PT 14.8 , INR 1.26       [08-18-20 @ 05:26]  PTT: 31.0       [08-18-20 @ 05:26]      Creatinine Trend:  SCr 6.17 [08-18 @ 05:26]  SCr 8.56 [08-17 @ 05:59]  SCr 7.41 [08-16 @ 05:09]  SCr 5.49 [08-15 @ 05:26]  SCr 4.80 [08-14 @ 02:53]        Iron 45, TIBC 145, %sat 31      [12-26-19 @ 23:24]  Ferritin 1369      [12-26-19 @ 23:24]  PTH -- (Ca 8.4)      [02-21-20 @ 08:54]   93  HbA1c 6.0      [02-21-20 @ 08:53]  TSH 8.13      [08-16-20 @ 07:53]  Lipid: chol 124, TG 59, HDL 64, LDL 47      [08-08-20 @ 00:47]    HCV 0.24, Nonreact      [08-09-20 @ 19:37]  HIV Nonreact      [08-09-20 @ 19:26]

## 2020-08-18 NOTE — CHART NOTE - NSCHARTNOTEFT_GEN_A_CORE
Patient seen for dysphagia follow up.     65 yo M w/ PMH HTN, CAD s/p LAD stent 2009, 2019, carotid stenosis, ESRD on HD (MWF Dr Cardenas, [place] [facility]- on transplant list), DM (on IV insulin, controlled, managed by Jamie Corral), CVA c/b status epilepticus requiring intubation and PEG in 12/2019-went to rehab, peg was removed July 2020, dysphagia s/p PEG, atrial fibrillation (on eliquis last dose 8/5/2020), anemia and GI bleed (Feb 2020) who presented to hospital for cardiac angiogram. Patient reports cardiac evaluation for renal transplant- pharm stress test completed on 7/31/2020, revealing abnormal study: there is medium sized, moderate to severe defects in mid to distal anterior, mid to distal anterolateral walls predominantly reversible consistent with infarction with sig.. zackery- infarct ischemia. LVEF 45%. Recommended for cardiac angiogram for further ischemic evaluation. s/p cath 8/7. · Comments	Hospital Course:   8/8 VSS. s/p ECHO. As per son patient had positive QuantiFeron Gold at dialysis center, asymptomatic- ID consulted.   8/9 VSS; Echo results; EF=62%. Per ID, "Pt is afebrile without leukocytosis. CT Chest per radiology report with stable opacities when compared to 1/2020. RUL fissural opacity appears more prominent. In any case in this patient planned for renal transplant so, I would rule out for MTB. "  8/10 VSS. HD today.  8/11 VSS. AFB negative x2, third pending results.   8/12 VSS; AFB x3 Negative - HD today, plan to remove permacath by IR after HD.   8/13/20 Off-Pump CABG x 3: LIMA-LAD, LSVG-OM1, LSVG-OM2. Urgent post op dialysis for hyperkalemia, permacath d/c preop, temp catheter placed. HD with NO UF per CTICU.  8/14: Extubated. S&S consulted due to history of dysphagia.  8/15: Seen by speech, started on dysphagia 3 with nectar thick liquids. + strongyloide antibody as per ID, 2 days of iverrectin (potential renal transplant candidate)  8/16 VSS - glucose 144 this am; transfer to floor  8/17 VSS - NPO for permacath - call from IR Dr. Barron.  d/t increased BUn -100 - will place Shiley today & transfer over to Permacath later in the week.  Pt is NPO   8/18 VSS- permacath placed in IR this am.  Bleeding from site on arrival to floor. IR PA notified - pressure applied - will monitor - may keep pt. one more day to monitor permacath site.       Pt received upright in bed on room air. Son present at bedside. A0x4. No changes in oral motor function. Pt continues to present with suspected pharyngeal dysphagia characterized by multiple swallows per bolus and s/s of airway protection deficits with thin liquids. No overt, clinical s/s of aspiration/penetration with nectar thick liquids noted this date.     -Recommendations: Continue with diet as ordered. Recommend MBS to objectively assess oropharyngeal swallow function. Aspiration precautions. Maintain adequate oral hygiene.     -Purposeful proactive rounding reinforced and 5 Ps addressed. Pt left in no distress. Discussed with Pt and Pt son. Pending discussion with team to determine plan of care; MBS inhouse or outpatient.    Narda Alvarado MS CCC-SLP  Speech-Language Pathologist  ; [phone] Patient seen for dysphagia follow up.     65 yo M w/ PMH HTN, CAD s/p LAD stent 2009, 2019, carotid stenosis, ESRD on HD (MWF Dr Cardenas, [facility]- on transplant list), DM (on IV insulin, controlled, managed by Jamie Corral), CVA c/b status epilepticus requiring intubation and PEG in 12/2019-went to rehab, peg was removed July 2020, dysphagia s/p PEG, atrial fibrillation (on eliquis last dose 8/5/2020), anemia and GI bleed (Feb 2020) who presented to hospital for cardiac angiogram. Patient reports cardiac evaluation for renal transplant- pharm stress test completed on 7/31/2020, revealing abnormal study: there is medium sized, moderate to severe defects in mid to distal anterior, mid to distal anterolateral walls predominantly reversible consistent with infarction with sig.. zackery- infarct ischemia. LVEF 45%. Recommended for cardiac angiogram for further ischemic evaluation. s/p cath 8/7.     Hospital Course:   8/8 VSS. s/p ECHO. As per son patient had positive QuantiFeron Gold at dialysis center, asymptomatic- ID consulted.   8/9 VSS; Echo results; EF=62%. Per ID, "Pt is afebrile without leukocytosis. CT Chest per radiology report with stable opacities when compared to 1/2020. RUL fissural opacity appears more prominent. In any case in this patient planned for renal transplant so, I would rule out for MTB. "  8/10 VSS. HD today.  8/11 VSS. AFB negative x2, third pending results.   8/12 VSS; AFB x3 Negative - HD today, plan to remove permacath by IR after HD.   8/13/20 Off-Pump CABG x 3: LIMA-LAD, LSVG-OM1, LSVG-OM2. Urgent post op dialysis for hyperkalemia, permacath d/c preop, temp catheter placed. HD with NO UF per CTICU.  8/14: Extubated. S&S consulted due to history of dysphagia.  8/15: Seen by speech, started on dysphagia 3 with nectar thick liquids. + strongyloide antibody as per ID, 2 days of iverrectin (potential renal transplant candidate)  8/16 VSS - glucose 144 this am; transfer to floor  8/17 VSS - NPO for permacath - call from IR Dr. Barron.  d/t increased BUn -100 - will place Shiley today & transfer over to Permacath later in the week.  Pt is NPO   8/18 VSS- permacath placed in IR this am.  Bleeding from site on arrival to floor. IR PA notified - pressure applied - will monitor - may keep pt. one more day to monitor permacath site.       Pt received upright in bed on room air. Son present at bedside. A0x4. No changes in oral motor function. Pt continues to present with suspected pharyngeal dysphagia characterized by multiple swallows per bolus and s/s of airway protection deficits with thin liquids. No overt, clinical s/s of aspiration/penetration with nectar thick liquids noted this date.     -Recommendations: Continue with diet as ordered. Recommend MBS to objectively assess oropharyngeal swallow function. Aspiration precautions. Maintain adequate oral hygiene.     -Purposeful proactive rounding reinforced and 5 Ps addressed. Pt left in no distress. Discussed with Pt and Pt son. Pending discussion with team to determine plan of care; MBS inhouse or outpatient.    Narda Alvarado MS CCC-SLP  Speech-Language Pathologist  ; [phone] Patient seen for dysphagia follow up.     63 yo M w/ PMH HTN, CAD s/p LAD stent 2009, 2019, carotid stenosis, ESRD on HD (MWF Dr Cardenas, [place] [facility]- on transplant list), DM (on IV insulin, controlled, managed by Jamie Corral), CVA c/b status epilepticus requiring intubation and PEG in 12/2019-went to rehab, peg was removed July 2020, dysphagia s/p PEG, atrial fibrillation (on eliquis last dose 8/5/2020), anemia and GI bleed (Feb 2020) who presented to hospital for cardiac angiogram. Patient reports cardiac evaluation for renal transplant- pharm stress test completed on 7/31/2020, revealing abnormal study: there is medium sized, moderate to severe defects in mid to distal anterior, mid to distal anterolateral walls predominantly reversible consistent with infarction with sig.. zackery- infarct ischemia. LVEF 45%. Recommended for cardiac angiogram for further ischemic evaluation. s/p cath 8/7.     Hospital Course:   8/8 VSS. s/p ECHO. As per son patient had positive QuantiFeron Gold at dialysis center, asymptomatic- ID consulted.   8/9 VSS; Echo results; EF=62%. Per ID, "Pt is afebrile without leukocytosis. CT Chest per radiology report with stable opacities when compared to 1/2020. RUL fissural opacity appears more prominent. In any case in this patient planned for renal transplant so, I would rule out for MTB. "  8/10 VSS. HD today.  8/11 VSS. AFB negative x2, third pending results.   8/12 VSS; AFB x3 Negative - HD today, plan to remove permacath by IR after HD.   8/13/20 Off-Pump CABG x 3: LIMA-LAD, LSVG-OM1, LSVG-OM2. Urgent post op dialysis for hyperkalemia, permacath d/c preop, temp catheter placed. HD with NO UF per CTICU.  8/14: Extubated. S&S consulted due to history of dysphagia.  8/15: Seen by speech, started on dysphagia 3 with nectar thick liquids. + strongyloide antibody as per ID, 2 days of iverrectin (potential renal transplant candidate)  8/16 VSS - glucose 144 this am; transfer to floor  8/17 VSS - NPO for permacath - call from IR Dr. Barron.  d/t increased BUn -100 - will place Shiley today & transfer over to Permacath later in the week.  Pt is NPO   8/18 VSS- permacath placed in IR this am.  Bleeding from site on arrival to floor. IR PA notified - pressure applied - will monitor - may keep pt. one more day to monitor permacath site.       Pt received upright in bed on room air. Son present at bedside. A0x4. No changes in oral motor function. Pt continues to present with suspected pharyngeal dysphagia characterized by multiple swallows per bolus and s/s of airway protection deficits with thin liquids. No overt, clinical s/s of aspiration/penetration with nectar thick liquids noted this date.     -Recommendations: Continue with diet as ordered. Recommend MBS to objectively assess oropharyngeal swallow function. Aspiration precautions. Maintain adequate oral hygiene.     -Purposeful proactive rounding reinforced and 5 Ps addressed. Pt left in no distress. Discussed with Pt and Pt son. Discussed with TAMMY Berrios.     Narda Alvarado MS CCC-SLP  Speech-Language Pathologist  ; [phone]

## 2020-08-19 ENCOUNTER — TRANSCRIPTION ENCOUNTER (OUTPATIENT)
Age: 65
End: 2020-08-19

## 2020-08-19 VITALS
SYSTOLIC BLOOD PRESSURE: 135 MMHG | RESPIRATION RATE: 18 BRPM | TEMPERATURE: 99 F | OXYGEN SATURATION: 99 % | DIASTOLIC BLOOD PRESSURE: 60 MMHG | HEART RATE: 75 BPM

## 2020-08-19 LAB
ANION GAP SERPL CALC-SCNC: 19 MMOL/L — HIGH (ref 5–17)
BUN SERPL-MCNC: 86 MG/DL — HIGH (ref 7–23)
CALCIUM SERPL-MCNC: 9.2 MG/DL — SIGNIFICANT CHANGE UP (ref 8.4–10.5)
CHLORIDE SERPL-SCNC: 91 MMOL/L — LOW (ref 96–108)
CO2 SERPL-SCNC: 21 MMOL/L — LOW (ref 22–31)
CREAT SERPL-MCNC: 7.42 MG/DL — HIGH (ref 0.5–1.3)
GLUCOSE BLDC GLUCOMTR-MCNC: 119 MG/DL — HIGH (ref 70–99)
GLUCOSE BLDC GLUCOMTR-MCNC: 87 MG/DL — SIGNIFICANT CHANGE UP (ref 70–99)
GLUCOSE SERPL-MCNC: 53 MG/DL — LOW (ref 70–99)
HCT VFR BLD CALC: 25.2 % — LOW (ref 39–50)
HGB BLD-MCNC: 8.2 G/DL — LOW (ref 13–17)
INR BLD: 1.35 RATIO — HIGH (ref 0.88–1.16)
MCHC RBC-ENTMCNC: 28.5 PG — SIGNIFICANT CHANGE UP (ref 27–34)
MCHC RBC-ENTMCNC: 32.5 GM/DL — SIGNIFICANT CHANGE UP (ref 32–36)
MCV RBC AUTO: 87.5 FL — SIGNIFICANT CHANGE UP (ref 80–100)
NRBC # BLD: 0 /100 WBCS — SIGNIFICANT CHANGE UP (ref 0–0)
PLATELET # BLD AUTO: 231 K/UL — SIGNIFICANT CHANGE UP (ref 150–400)
POTASSIUM SERPL-MCNC: 5.7 MMOL/L — HIGH (ref 3.5–5.3)
POTASSIUM SERPL-SCNC: 5.7 MMOL/L — HIGH (ref 3.5–5.3)
PROTHROM AB SERPL-ACNC: 15.8 SEC — HIGH (ref 10.6–13.6)
RBC # BLD: 2.88 M/UL — LOW (ref 4.2–5.8)
RBC # FLD: 17.6 % — HIGH (ref 10.3–14.5)
SODIUM SERPL-SCNC: 131 MMOL/L — LOW (ref 135–145)
WBC # BLD: 7.9 K/UL — SIGNIFICANT CHANGE UP (ref 3.8–10.5)
WBC # FLD AUTO: 7.9 K/UL — SIGNIFICANT CHANGE UP (ref 3.8–10.5)

## 2020-08-19 PROCEDURE — 84436 ASSAY OF TOTAL THYROXINE: CPT

## 2020-08-19 PROCEDURE — C1769: CPT

## 2020-08-19 PROCEDURE — 84132 ASSAY OF SERUM POTASSIUM: CPT

## 2020-08-19 PROCEDURE — 93005 ELECTROCARDIOGRAM TRACING: CPT

## 2020-08-19 PROCEDURE — 93306 TTE W/DOPPLER COMPLETE: CPT

## 2020-08-19 PROCEDURE — 87015 SPECIMEN INFECT AGNT CONCNTJ: CPT

## 2020-08-19 PROCEDURE — 93880 EXTRACRANIAL BILAT STUDY: CPT

## 2020-08-19 PROCEDURE — 85576 BLOOD PLATELET AGGREGATION: CPT

## 2020-08-19 PROCEDURE — C1752: CPT

## 2020-08-19 PROCEDURE — 77001 FLUOROGUIDE FOR VEIN DEVICE: CPT

## 2020-08-19 PROCEDURE — C1887: CPT

## 2020-08-19 PROCEDURE — 82550 ASSAY OF CK (CPK): CPT

## 2020-08-19 PROCEDURE — 86891 AUTOLOGOUS BLOOD OP SALVAGE: CPT

## 2020-08-19 PROCEDURE — 80061 LIPID PANEL: CPT

## 2020-08-19 PROCEDURE — 97116 GAIT TRAINING THERAPY: CPT

## 2020-08-19 PROCEDURE — 97530 THERAPEUTIC ACTIVITIES: CPT

## 2020-08-19 PROCEDURE — 92526 ORAL FUNCTION THERAPY: CPT

## 2020-08-19 PROCEDURE — 83735 ASSAY OF MAGNESIUM: CPT

## 2020-08-19 PROCEDURE — 85027 COMPLETE CBC AUTOMATED: CPT

## 2020-08-19 PROCEDURE — 74230 X-RAY XM SWLNG FUNCJ C+: CPT | Mod: 26

## 2020-08-19 PROCEDURE — 85396 CLOTTING ASSAY WHOLE BLOOD: CPT

## 2020-08-19 PROCEDURE — 99261: CPT

## 2020-08-19 PROCEDURE — 86480 TB TEST CELL IMMUN MEASURE: CPT

## 2020-08-19 PROCEDURE — 92610 EVALUATE SWALLOWING FUNCTION: CPT

## 2020-08-19 PROCEDURE — 86682 HELMINTH ANTIBODY: CPT

## 2020-08-19 PROCEDURE — 85384 FIBRINOGEN ACTIVITY: CPT

## 2020-08-19 PROCEDURE — 97161 PT EVAL LOW COMPLEX 20 MIN: CPT

## 2020-08-19 PROCEDURE — 85730 THROMBOPLASTIN TIME PARTIAL: CPT

## 2020-08-19 PROCEDURE — 82330 ASSAY OF CALCIUM: CPT

## 2020-08-19 PROCEDURE — 36558 INSERT TUNNELED CV CATH: CPT

## 2020-08-19 PROCEDURE — U0003: CPT

## 2020-08-19 PROCEDURE — 86923 COMPATIBILITY TEST ELECTRIC: CPT

## 2020-08-19 PROCEDURE — 84439 ASSAY OF FREE THYROXINE: CPT

## 2020-08-19 PROCEDURE — 36556 INSERT NON-TUNNEL CV CATH: CPT

## 2020-08-19 PROCEDURE — 83605 ASSAY OF LACTIC ACID: CPT

## 2020-08-19 PROCEDURE — P9016: CPT

## 2020-08-19 PROCEDURE — 99152 MOD SED SAME PHYS/QHP 5/>YRS: CPT

## 2020-08-19 PROCEDURE — C1894: CPT

## 2020-08-19 PROCEDURE — 76376 3D RENDER W/INTRP POSTPROCES: CPT

## 2020-08-19 PROCEDURE — 82435 ASSAY OF BLOOD CHLORIDE: CPT

## 2020-08-19 PROCEDURE — 86850 RBC ANTIBODY SCREEN: CPT

## 2020-08-19 PROCEDURE — C1753: CPT

## 2020-08-19 PROCEDURE — 92611 MOTION FLUOROSCOPY/SWALLOW: CPT

## 2020-08-19 PROCEDURE — C1889: CPT

## 2020-08-19 PROCEDURE — 87640 STAPH A DNA AMP PROBE: CPT

## 2020-08-19 PROCEDURE — 83036 HEMOGLOBIN GLYCOSYLATED A1C: CPT

## 2020-08-19 PROCEDURE — 86803 HEPATITIS C AB TEST: CPT

## 2020-08-19 PROCEDURE — 86900 BLOOD TYPING SEROLOGIC ABO: CPT

## 2020-08-19 PROCEDURE — 84295 ASSAY OF SERUM SODIUM: CPT

## 2020-08-19 PROCEDURE — 82947 ASSAY GLUCOSE BLOOD QUANT: CPT

## 2020-08-19 PROCEDURE — 84100 ASSAY OF PHOSPHORUS: CPT

## 2020-08-19 PROCEDURE — 99153 MOD SED SAME PHYS/QHP EA: CPT

## 2020-08-19 PROCEDURE — 80053 COMPREHEN METABOLIC PANEL: CPT

## 2020-08-19 PROCEDURE — 94002 VENT MGMT INPAT INIT DAY: CPT

## 2020-08-19 PROCEDURE — 82962 GLUCOSE BLOOD TEST: CPT

## 2020-08-19 PROCEDURE — 82553 CREATINE MB FRACTION: CPT

## 2020-08-19 PROCEDURE — 80048 BASIC METABOLIC PNL TOTAL CA: CPT

## 2020-08-19 PROCEDURE — 84443 ASSAY THYROID STIM HORMONE: CPT

## 2020-08-19 PROCEDURE — 87389 HIV-1 AG W/HIV-1&-2 AB AG IA: CPT

## 2020-08-19 PROCEDURE — P9041: CPT

## 2020-08-19 PROCEDURE — P9047: CPT

## 2020-08-19 PROCEDURE — 86901 BLOOD TYPING SEROLOGIC RH(D): CPT

## 2020-08-19 PROCEDURE — 93571 IV DOP VEL&/PRESS C FLO 1ST: CPT | Mod: LD

## 2020-08-19 PROCEDURE — 71045 X-RAY EXAM CHEST 1 VIEW: CPT

## 2020-08-19 PROCEDURE — 93458 L HRT ARTERY/VENTRICLE ANGIO: CPT

## 2020-08-19 PROCEDURE — 82803 BLOOD GASES ANY COMBINATION: CPT

## 2020-08-19 PROCEDURE — 71250 CT THORAX DX C-: CPT

## 2020-08-19 PROCEDURE — 85610 PROTHROMBIN TIME: CPT

## 2020-08-19 PROCEDURE — 84480 ASSAY TRIIODOTHYRONINE (T3): CPT

## 2020-08-19 PROCEDURE — 86777 TOXOPLASMA ANTIBODY: CPT

## 2020-08-19 PROCEDURE — 84484 ASSAY OF TROPONIN QUANT: CPT

## 2020-08-19 PROCEDURE — 76937 US GUIDE VASCULAR ACCESS: CPT

## 2020-08-19 PROCEDURE — C1750: CPT

## 2020-08-19 PROCEDURE — C1751: CPT

## 2020-08-19 PROCEDURE — 87116 MYCOBACTERIA CULTURE: CPT

## 2020-08-19 PROCEDURE — P9045: CPT

## 2020-08-19 PROCEDURE — 87641 MR-STAPH DNA AMP PROBE: CPT

## 2020-08-19 PROCEDURE — 82565 ASSAY OF CREATININE: CPT

## 2020-08-19 PROCEDURE — 87206 SMEAR FLUORESCENT/ACID STAI: CPT

## 2020-08-19 PROCEDURE — 74230 X-RAY XM SWLNG FUNCJ C+: CPT

## 2020-08-19 PROCEDURE — 85014 HEMATOCRIT: CPT

## 2020-08-19 RX ORDER — ATORVASTATIN CALCIUM 80 MG/1
1 TABLET, FILM COATED ORAL
Qty: 0 | Refills: 0 | DISCHARGE

## 2020-08-19 RX ORDER — INSULIN DETEMIR 100/ML (3)
8 INSULIN PEN (ML) SUBCUTANEOUS
Qty: 1 | Refills: 0
Start: 2020-08-19 | End: 2020-09-17

## 2020-08-19 RX ORDER — FUROSEMIDE 40 MG
1 TABLET ORAL
Qty: 0 | Refills: 0 | DISCHARGE

## 2020-08-19 RX ORDER — SENNA PLUS 8.6 MG/1
1 TABLET ORAL
Qty: 7 | Refills: 0
Start: 2020-08-19 | End: 2020-08-25

## 2020-08-19 RX ORDER — ATORVASTATIN CALCIUM 80 MG/1
1 TABLET, FILM COATED ORAL
Qty: 30 | Refills: 0
Start: 2020-08-19 | End: 2020-09-17

## 2020-08-19 RX ORDER — APIXABAN 2.5 MG/1
1 TABLET, FILM COATED ORAL
Qty: 60 | Refills: 0
Start: 2020-08-19 | End: 2020-09-17

## 2020-08-19 RX ORDER — OXYCODONE HYDROCHLORIDE 5 MG/1
1 TABLET ORAL
Qty: 16 | Refills: 0
Start: 2020-08-19 | End: 2020-08-22

## 2020-08-19 RX ORDER — PANTOPRAZOLE SODIUM 20 MG/1
1 TABLET, DELAYED RELEASE ORAL
Qty: 20 | Refills: 0
Start: 2020-08-19 | End: 2020-09-07

## 2020-08-19 RX ORDER — ASPIRIN/CALCIUM CARB/MAGNESIUM 324 MG
1 TABLET ORAL
Qty: 30 | Refills: 0
Start: 2020-08-19 | End: 2020-09-17

## 2020-08-19 RX ORDER — ACETAMINOPHEN 500 MG
2 TABLET ORAL
Qty: 0 | Refills: 0 | DISCHARGE
Start: 2020-08-19

## 2020-08-19 RX ORDER — LEVOTHYROXINE SODIUM 125 MCG
1 TABLET ORAL
Qty: 30 | Refills: 0
Start: 2020-08-19 | End: 2020-09-17

## 2020-08-19 RX ORDER — INSULIN DETEMIR 100/ML (3)
8 INSULIN PEN (ML) SUBCUTANEOUS
Qty: 1 | Refills: 0
Start: 2020-08-19

## 2020-08-19 RX ORDER — OXYCODONE HYDROCHLORIDE 5 MG/1
1 TABLET ORAL
Qty: 12 | Refills: 0
Start: 2020-08-19 | End: 2020-08-21

## 2020-08-19 RX ORDER — LANOLIN ALCOHOL/MO/W.PET/CERES
1 CREAM (GRAM) TOPICAL
Qty: 0 | Refills: 0 | DISCHARGE
Start: 2020-08-19

## 2020-08-19 RX ORDER — SENNA PLUS 8.6 MG/1
1 TABLET ORAL
Qty: 0 | Refills: 0 | DISCHARGE

## 2020-08-19 RX ORDER — METOPROLOL TARTRATE 50 MG
1 TABLET ORAL
Qty: 90 | Refills: 0
Start: 2020-08-19 | End: 2020-09-17

## 2020-08-19 RX ORDER — TELMISARTAN 20 MG/1
1 TABLET ORAL
Qty: 0 | Refills: 0 | DISCHARGE

## 2020-08-19 RX ORDER — INSULIN GLARGINE 100 [IU]/ML
8 INJECTION, SOLUTION SUBCUTANEOUS AT BEDTIME
Refills: 0 | Status: DISCONTINUED | OUTPATIENT
Start: 2020-08-19 | End: 2020-08-19

## 2020-08-19 RX ORDER — INSULIN DETEMIR 100/ML (3)
2 INSULIN PEN (ML) SUBCUTANEOUS
Qty: 0 | Refills: 0 | DISCHARGE

## 2020-08-19 RX ADMIN — Medication 81 MILLIGRAM(S): at 15:59

## 2020-08-19 RX ADMIN — PANTOPRAZOLE SODIUM 40 MILLIGRAM(S): 20 TABLET, DELAYED RELEASE ORAL at 06:16

## 2020-08-19 RX ADMIN — Medication 88 MICROGRAM(S): at 05:52

## 2020-08-19 RX ADMIN — Medication 50 MILLIGRAM(S): at 05:52

## 2020-08-19 RX ADMIN — OXYCODONE HYDROCHLORIDE 5 MILLIGRAM(S): 5 TABLET ORAL at 05:52

## 2020-08-19 RX ADMIN — ERYTHROPOIETIN 10000 UNIT(S): 10000 INJECTION, SOLUTION INTRAVENOUS; SUBCUTANEOUS at 12:46

## 2020-08-19 RX ADMIN — Medication 5 UNIT(S): at 08:00

## 2020-08-19 RX ADMIN — CHLORHEXIDINE GLUCONATE 1 APPLICATION(S): 213 SOLUTION TOPICAL at 07:50

## 2020-08-19 RX ADMIN — Medication 50 MILLIGRAM(S): at 15:59

## 2020-08-19 NOTE — SWALLOW VFSS/MBS ASSESSMENT ADULT - COMMENTS
Hospital Course:   8/8 VSS. s/p ECHO. As per son patient had positive QuantiFeron Gold at dialysis center, asymptomatic- ID consulted.   8/9 VSS; Echo results; EF=62%. Per ID, "Pt is afebrile without leukocytosis. CT Chest per radiology report with stable opacities when compared to 1/2020. RUL fissural opacity appears more prominent. In any case in this patient planned for renal transplant so, I would rule out for MTB. "  8/10 VSS. HD today.  8/11 VSS. AFB negative x2, third pending results.   8/12 VSS; AFB x3 Negative - HD today, plan to remove permacath by IR after HD.   8/13/20 Off-Pump CABG x 3: LIMA-LAD, LSVG-OM1, LSVG-OM2. Urgent post op dialysis for hyperkalemia, permacath d/c preop, temp catheter placed. HD with NO UF per CTICU.  8/14: Extubated. S&S consulted due to history of dysphagia.  8/15: Seen by speech, started on dysphagia 3 with nectar thick liquids. + strongyloide antibody as per ID, 2 days of iverrectin (potential renal transplant candidate)  8/16 VSS - glucose 144 this am; transfer to floor  8/17 VSS - NPO for permacath - call from IR Dr. Barron.  d/t increased BUn -100 - will place Shiley today & transfer over to Permacath later in the week.  Pt is NPO   8/18 VSS- permacath placed in IR. Bleeding from site on arrival to floor. IR PA notified - pressure applied - will monitor.  SEE ADDENDUM FOR DYSPHAGIA TX....

## 2020-08-19 NOTE — PROGRESS NOTE ADULT - NSHPATTENDINGPLANDISCUSS_GEN_ALL_CORE
hd unit
hd unit
CTS Team and patient
HD UNIT,
RN
hd unit, son
team, HD UNIT
CTS team
CTS team
Dr Tolentino
Dr. Guillory

## 2020-08-19 NOTE — PROGRESS NOTE ADULT - SUBJECTIVE AND OBJECTIVE BOX
[facility]   MD Matheus Gary MD, D.O. Ruoru Wong, PA    From 7 AM - 5 PM:  OFFICE: [phone]  Dr. Barrientos cell: [phone]  Dr. Rey cell: [phone]  Dr. Witt cell: [phone]  WALDO Valentin cell: [phone]    From 5 PM - 7 AM: Answering Service: [phone]      RENAL FOLLOW UP NOTE  --------------------------------------------------------------------------------  HPI:  Pt seen and examined at bedside.   Denies SOB, chest pain     PAST HISTORY  --------------------------------------------------------------------------------  No significant changes to PMH, PSH, FHx, SHx, unless otherwise noted    ALLERGIES & MEDICATIONS  --------------------------------------------------------------------------------  Allergies    No Known Allergies    Intolerances      Standing Inpatient Medications  aspirin enteric coated 81 milliGRAM(s) Oral daily  atorvastatin 80 milliGRAM(s) Oral at bedtime  chlorhexidine 2% Cloths 1 Application(s) Topical <User Schedule>  chlorhexidine 4% Liquid 1 Application(s) Topical <User Schedule>  dextrose 5%. 1000 milliLiter(s) IV Continuous <Continuous>  dextrose 50% Injectable 12.5 Gram(s) IV Push once  dextrose 50% Injectable 25 Gram(s) IV Push once  dextrose 50% Injectable 25 Gram(s) IV Push once  epoetin cortney-epbx (RETACRIT) Injectable 45234 Unit(s) IV Push <User Schedule>  insulin glargine Injectable (LANTUS) 8 Unit(s) SubCutaneous at bedtime  insulin lispro (HumaLOG) corrective regimen sliding scale   SubCutaneous three times a day before meals  insulin lispro (HumaLOG) corrective regimen sliding scale   SubCutaneous at bedtime  levothyroxine 88 MICROGram(s) Oral daily  melatonin 3 milliGRAM(s) Oral at bedtime  metoprolol tartrate 50 milliGRAM(s) Oral every 8 hours  pantoprazole    Tablet 40 milliGRAM(s) Oral before breakfast  polyethylene glycol 3350 17 Gram(s) Oral daily  sodium chloride 0.9%. 1000 milliLiter(s) IV Continuous <Continuous>    PRN Inpatient Medications  acetaminophen   Tablet .. 650 milliGRAM(s) Oral every 6 hours PRN  dextrose 40% Gel 15 Gram(s) Oral once PRN  glucagon  Injectable 1 milliGRAM(s) IntraMuscular once PRN  oxyCODONE    IR 5 milliGRAM(s) Oral every 4 hours PRN  sodium chloride 0.9% lock flush 10 milliLiter(s) IV Push every 1 hour PRN      REVIEW OF SYSTEMS  --------------------------------------------------------------------------------  General: no fever  CVS: no chest pain  RESP: no sob, no cough  ABD: no abdominal pain  : no dysuria,  MSK: no edema     VITALS/PHYSICAL EXAM  --------------------------------------------------------------------------------  T(C): 36.8 (08-19-20 @ 05:07), Max: 37.1 (08-18-20 @ 11:07)  HR: 72 (08-19-20 @ 05:07) (61 - 82)  BP: 124/72 (08-19-20 @ 05:07) (122/73 - 135/80)  RR: 18 (08-19-20 @ 05:07) (18 - 19)  SpO2: 94% (08-19-20 @ 05:07) (94% - 96%)  Wt(kg): --  Height (cm): 180.34 (08-18-20 @ 09:55)  Weight (kg): 71.4 (08-18-20 @ 09:55)  BMI (kg/m2): 22 (08-18-20 @ 09:55)  BSA (m2): 1.9 (08-18-20 @ 09:55)      08-18-20 @ 07:01  -  08-19-20 @ 07:00  --------------------------------------------------------  IN: 0 mL / OUT: 590 mL / NET: -590 mL    08-19-20 @ 07:01  -  08-19-20 @ 10:37  --------------------------------------------------------  IN: 240 mL / OUT: 25 mL / NET: 215 mL      Physical Exam:  	Gen: NAD  	HEENT: MMM  	Pulm: CTA B/L  	CV: S1S2  	Abd: Soft, +BS  	Ext: trace LE edema B/L                      Neuro: Awake   	Skin: Warm and Dry   	Vascular access: + tunneled catheter. dressing clean, dry non-bloody     LABS/STUDIES  --------------------------------------------------------------------------------              8.2    7.90  >-----------<  231      [08-19-20 @ 04:54]              25.2     131  |  91  |  86  ----------------------------<  53      [08-19-20 @ 04:54]  5.7   |  21  |  7.42        Ca     9.2     [08-19-20 @ 04:54]      PT/INR: PT 15.8 , INR 1.35       [08-19-20 @ 04:54]  PTT: 31.0       [08-18-20 @ 05:26]      Creatinine Trend:  SCr 7.42 [08-19 @ 04:54]  SCr 6.17 [08-18 @ 05:26]  SCr 8.56 [08-17 @ 05:59]  SCr 7.41 [08-16 @ 05:09]  SCr 5.49 [08-15 @ 05:26]        Iron 45, TIBC 145, %sat 31      [12-26-19 @ 23:24]  Ferritin 1369      [12-26-19 @ 23:24]  PTH -- (Ca 8.4)      [02-21-20 @ 08:54]   93  HbA1c 6.0      [02-21-20 @ 08:53]  TSH 8.13      [08-16-20 @ 07:53]  Lipid: chol 124, TG 59, HDL 64, LDL 47      [08-08-20 @ 00:47]    HCV 0.24, Nonreact      [08-09-20 @ 19:37]  HIV Nonreact      [08-09-20 @ 19:26]

## 2020-08-19 NOTE — DISCHARGE NOTE PROVIDER - NSDCCPCAREPLAN_GEN_ALL_CORE_FT
PRINCIPAL DISCHARGE DIAGNOSIS  Diagnosis: S/P CABG x 3  Assessment and Plan of Treatment: shower daily  weigh yourself daily  continue current prescriptions as ordered  increase activity as tolerated   Please follow speech and swallow dietary recommendentions  Soft texture diet:  1) Supervision with meals, 2) Crush meds or provide via single CUP sips thin liquids 3) ALL liquids via CUP. NO STRAW. NO SPOON.  4) Provide small single bites and sips at slow rate 5) SWALLOW TWO TIMES PER BITE AND SIP 6) NO MIXED CONSISTENCIES   no added salt; low fat; low cholesterol, low salt diet.   follow up with Cardiologist in 1-2 weeks. Call to schedule appointment.  follow up with cardiac surgeon

## 2020-08-19 NOTE — DISCHARGE NOTE PROVIDER - NSDCPNSUBOBJ_GEN_ALL_CORE
Subjective:     Patient seen lying bed stating he wants to go home today. Patient is medical stable from CT surgery standpoint. Patient is SR 60, will receive HD today before discharge. Passed MBS - upgraded to SOFT DIET as per Speech and swallow recs.     L chest Permacath bleeding as stopped, stable, c/d/i. Will send patient home on home Eliquis 5BID for AFib.         Vital Signs Last 24 Hrs    T(C): 36.8 (19 Aug 2020 12:23), Max: 36.9 (18 Aug 2020 20:37)    T(F): 98.2 (19 Aug 2020 12:23), Max: 98.4 (18 Aug 2020 20:37)    HR: 62 (19 Aug 2020 12:23) (61 - 72)    BP: 142/69 (19 Aug 2020 12:23) (122/73 - 151/72)    BP(mean): --    RR: 18 (19 Aug 2020 12:23) (18 - 18)    SpO2: 96% (19 Aug 2020 12:23) (94% - 98%)        PHYSICAL EXAM:    Neurology: alert and oriented x 3, nonfocal, no gross deficits    CV :RRR S1S2, no murmurs, rubs or gallops    Sternal Wound :  CDI , Stable    Lungs: CTA B/L, No wheezing, rales, rhonchi. Non labored respirations     Abdomen: soft, nontender, nondistended, positive bowel sounds, last bowel movement           Extremities: Trace lower extremity edema no calf tenderness    LIJ permacath - c/d/i no active bleeding noted.             I have spent 45 mins with patient.

## 2020-08-19 NOTE — DISCHARGE NOTE PROVIDER - NSDCACTIVITY_GEN_ALL_CORE
Showering allowed/No heavy lifting/straining/Do not drive or operate machinery/Do not make important decisions/Stairs allowed/Walking - Indoors allowed/Walking - Outdoors allowed

## 2020-08-19 NOTE — PROGRESS NOTE ADULT - PROBLEM SELECTOR PLAN 3
+QuantiFeron gold   ABF x 3 Negative   ID following
+QuantiFeron gold   ABF x 3 Negative   ID following
On medications,  no chest pain, stable, monitored and followed up by primary cardiothoracic team/cardiology team.
+QuantiFeron gold   ABF x 3 Negative   ID following
On medications,  no chest pain, stable, monitored and followed up by primary cardiothoracic team/cardiology team.
On medications,  no chest pain, stable, monitored and followed up by primary cardiothoracic team/cardiology team.

## 2020-08-19 NOTE — PROGRESS NOTE ADULT - PROBLEM SELECTOR PLAN 1
Will decrease Lantus to 8 units at bed time.  Will DC standing-dose Humalog and continue Humalog correction scale coverage.  Patient with well-controlled DM, A1C 6.6%, he was on basal bolus insulin at home (Levemir & Novolog); His insulin requirement has been decreasing while inpatient. Suggest DC on the following DM regimen:  -Levemir 8u at bedtime  -Novolog sliding scale before meals  -FU endo 2 weeks  Discussed plan with patient. Counseled for compliance with consistent low carb diet and exercise as tolerated outpatient.

## 2020-08-19 NOTE — DISCHARGE NOTE PROVIDER - NSDCFUADDAPPT_GEN_ALL_CORE_FT
Follow up with Dr. Guillory on 9/3/20 12 Noon at [address] for you post op follow up appointment, if you are unable to keep this appointment please call the [facility] office to re-schedule at [phone].     Followup with Cardiologist in 1-2 weeks for followup visit. Call to schedule appt     Followup with Dialysis center for dialysis Friday 8/21/20.     Followup with Endocrinology or PCP 1-2 weeks for Diabetes Management.

## 2020-08-19 NOTE — DISCHARGE NOTE PROVIDER - HOSPITAL COURSE
63 yo M w/ PMH HTN, CAD s/p LAD stent 2009, 2019, carotid stenosis, ESRD on HD (MWF Dr Cardenas, [place]- on transplant list), DM (on IV insulin, controlled, managed by Jamie Corral), CVA c/b status epilepticus requiring intubation and PEG in 12/2019-went to rehab, peg was removed July 2020, dysphagia s/p PEG, atrial fibrillation (on eliquis last dose 8/5/2020), anemia and GI bleed (Feb 2020) who presents today for cardiac angiogram. Patient reports cardiac evaluation for renal transplant- pharm stress test completed on 7/31/2020, revealing abnormal study: there is medium sized, moderate to severe defects in mid to distal anterior, mid to distal anterolateral walls predominantly reversible consistent with infarction with sig.. zackery- infarct ischemia. LVEF 45%. Recommended for cardiac angiogram for further ischemic evaluation. Patient is s/p cath-TVD-plan for CABG 8/13/20 with Dr. Guillory.         Son [phone] (07 Aug 2020 06:50)        8/12 VSS; AFB x3 Negative - HD today, plan to remove permacath by IR after HD. Plan for OR with Dr. Guillory for CABG tomorrow 8/13/20.     8/13/20 Off-Pump CABG x 3: LIMA-LAD, LSVG-OM1, LSVG-OM2    Urgent post op dialysis for hyperkalemia, permacath d/c preop, temp catheter placed    He was on a dysphagia diet at home preop, s&s consult pending.    Afib preop, on eliquis, resumed, beta blockers started.    Transferred to sdu.    8/15 The patient has  a central line that will need to be removed, hold eliquis.    Will need a permacath , ? monday, will need to contact IR Monday, consult placed in sunrise)    H/o dysphagia, placed on dys 3 diet thickened liquids , speech and swallow following    + strongyloide antibody as per ID, 2 days of iverrectin( potential renal transplant candidate)    8/16 VSS - glucose 144 this am - rounds made w/ Dr. Whitney this am - can isolate Pw's & transfer to floor    8/17 VSS - NPO for permacath - call from IR Dr. Barron.  d/t increased BUn -100 - will place Shiley today & transfer over to Permacath later in the week.  pt is NPO     8/18 VSS- permacath placed in IR this am.  Bleeding from site on arrival to floor . IR PA notified - pressure applied - will monitor - may keep pt. one more day to monitor permacath site.    8/19 VSS- HD today. Outpatient HD M/W/F. Plan for discharge today. Passed MBS - upgraded to soft diet. Permacath c/d/i with no active bleeding.

## 2020-08-19 NOTE — PROGRESS NOTE ADULT - PROBLEM SELECTOR PROBLEM 3
TB (pulmonary tuberculosis)
CAD (Coronary Artery Disease)
TB (pulmonary tuberculosis)

## 2020-08-19 NOTE — SWALLOW VFSS/MBS ASSESSMENT ADULT - ORAL PHASE COMMENTS
Min premature spillage over superior surface of epiglottis. Min-mod oral residue, which prematurely spills to pyriforms. Pt requires a verbal cue to secondary swallow and clear oral and pharyngeal residue. Min premature spillage toward pyriforms. Trace-min lingual residue post swallow. Pt benefitted from a cued secondary swallow to clear. Min premature spillage to valleculae. Trace lingual residue post swallow.

## 2020-08-19 NOTE — PROGRESS NOTE ADULT - ASSESSMENT
Assessment  DMT2: 64y Male with DM T2 with hyperglycemia, postop on basal bolus insulin, decreased dose yesterday, patient had hypoglycemic episode this AM, blood sugars now improving. He appears alert and comfortable, denies acute complaints, planning DC home this afternoon.  Hypothyroidism: On Synthroid 50 mcg po daily, compliant with Synthroid intake, subclinical, increased dose to synthroid 88mcg po daily.    CAD: S/P CABG, on meds, monitored.          Joann Adame MD  Cell: [phone]  Office: [phone] Assessment  DMT2: 64y Male with DM T2 with hyperglycemia, postop on basal bolus insulin, decreased dose yesterday, patient had hypoglycemic episode this AM,  blood sugars now improving. He appears alert and comfortable, denies acute complaints, planning DC home this afternoon.  Hypothyroidism: On Synthroid 50 mcg po daily, compliant with Synthroid intake, subclinical, increased dose to synthroid 88mcg po daily.    CAD: S/P CABG, on meds, monitored.          Joann Adame MD  Cell: [phone]  Office: [phone]

## 2020-08-19 NOTE — SWALLOW VFSS/MBS ASSESSMENT ADULT - RECOMMENDED CONSISTENCY
Soft texture diet. MD/team Please enter the following as provider to RN orders : 1) Supervision with meals, 2) Crush meds or provide via single CUP sips thin liquids 3) ALL liquids via CUP. NO STRAW. NO SPOON.  4) Provide small single bites and sips at slow rate 5) SWALLOW TWO TIMES PER BITE AND SIP 6) NO MIXED CONSISTENCIES 7) Aspiration precautions. Monitor for s/s aspiration/laryngeal penetration. If noted:  D/C p.o. intake, provide non-oral nutrition/hydration/meds

## 2020-08-19 NOTE — PROGRESS NOTE ADULT - SUBJECTIVE AND OBJECTIVE BOX
Chief complaint  Patient is a 64y old  Male who presents with a chief complaint of sob (18 Aug 2020 11:59)   Review of systems  Patient had hypoglycemic episode this AM, seen and examined at bedside.  He is sitting up in bed, appears alert and comfortable, denies acute complaints.    Labs and Fingersticks  CAPILLARY BLOOD GLUCOSE      POCT Blood Glucose.: 87 mg/dL (19 Aug 2020 07:29)  POCT Blood Glucose.: 100 mg/dL (18 Aug 2020 21:19)  POCT Blood Glucose.: 128 mg/dL (18 Aug 2020 16:38)  POCT Blood Glucose.: 74 mg/dL (18 Aug 2020 11:43)      Anion Gap, Serum: 19 <H> (08-19 @ 04:54)  Anion Gap, Serum: 19 <H> (08-18 @ 05:26)      Calcium, Total Serum: 9.2 (08-19 @ 04:54)  Calcium, Total Serum: 9.1 (08-18 @ 05:26)          08-19    131<L>  |  91<L>  |  86<H>  ----------------------------<  53<L>  5.7<H>   |  21<L>  |  7.42<H>    Ca    9.2      19 Aug 2020 04:54                          8.2    7.90  )-----------( 231      ( 19 Aug 2020 04:54 )             25.2     Medications  MEDICATIONS  (STANDING):  aspirin enteric coated 81 milliGRAM(s) Oral daily  atorvastatin 80 milliGRAM(s) Oral at bedtime  chlorhexidine 2% Cloths 1 Application(s) Topical <User Schedule>  chlorhexidine 4% Liquid 1 Application(s) Topical <User Schedule>  dextrose 5%. 1000 milliLiter(s) (50 mL/Hr) IV Continuous <Continuous>  dextrose 50% Injectable 12.5 Gram(s) IV Push once  dextrose 50% Injectable 25 Gram(s) IV Push once  dextrose 50% Injectable 25 Gram(s) IV Push once  epoetin cortney-epbx (RETACRIT) Injectable 14558 Unit(s) IV Push <User Schedule>  insulin glargine Injectable (LANTUS) 8 Unit(s) SubCutaneous at bedtime  insulin lispro (HumaLOG) corrective regimen sliding scale   SubCutaneous three times a day before meals  insulin lispro (HumaLOG) corrective regimen sliding scale   SubCutaneous at bedtime  levothyroxine 88 MICROGram(s) Oral daily  melatonin 3 milliGRAM(s) Oral at bedtime  metoprolol tartrate 50 milliGRAM(s) Oral every 8 hours  pantoprazole    Tablet 40 milliGRAM(s) Oral before breakfast  polyethylene glycol 3350 17 Gram(s) Oral daily  sodium chloride 0.9%. 1000 milliLiter(s) (10 mL/Hr) IV Continuous <Continuous>      Physical Exam  General: Patient comfortable in bed  Vital Signs Last 12 Hrs  T(F): 98.3 (08-19-20 @ 05:07), Max: 98.3 (08-19-20 @ 05:07)  HR: 72 (08-19-20 @ 05:07) (72 - 72)  BP: 124/72 (08-19-20 @ 05:07) (124/72 - 124/72)  BP(mean): --  RR: 18 (08-19-20 @ 05:07) (18 - 18)  SpO2: 94% (08-19-20 @ 05:07) (94% - 94%)  Neck: No palpable thyroid nodules.  CVS: S1S2, No murmurs  Respiratory: No wheezing, no crepitations  GI: Abdomen soft, bowel sounds positive  Musculoskeletal:  edema lower extremities.   Skin: No skin rashes, no ecchymosis    Diagnostics Chief complaint  Patient is a 64y old  Male who presents with a chief complaint of sob (18 Aug 2020 11:59)   Review of systems  Patient had hypoglycemic episode this AM, seen and examined at bedside.  He is sitting up in bed, appears alert and comfortable, denies acute complaints.    Labs and Fingersticks  CAPILLARY BLOOD GLUCOSE      POCT Blood Glucose.: 87 mg/dL (19 Aug 2020 07:29)  POCT Blood Glucose.: 100 mg/dL (18 Aug 2020 21:19)  POCT Blood Glucose.: 128 mg/dL (18 Aug 2020 16:38)  POCT Blood Glucose.: 74 mg/dL (18 Aug 2020 11:43)      Anion Gap, Serum: 19 <H> (08-19 @ 04:54)  Anion Gap, Serum: 19 <H> (08-18 @ 05:26)      Calcium, Total Serum: 9.2 (08-19 @ 04:54)  Calcium, Total Serum: 9.1 (08-18 @ 05:26)          08-19    131<L>  |  91<L>  |  86<H>  ----------------------------<  53<L>  5.7<H>   |  21<L>  |  7.42<H>    Ca    9.2      19 Aug 2020 04:54                          8.2    7.90  )-----------( 231      ( 19 Aug 2020 04:54 )             25.2     Medications  MEDICATIONS  (STANDING):  aspirin enteric coated 81 milliGRAM(s) Oral daily  atorvastatin 80 milliGRAM(s) Oral at bedtime  chlorhexidine 2% Cloths 1 Application(s) Topical <User Schedule>  chlorhexidine 4% Liquid 1 Application(s) Topical <User Schedule>  dextrose 5%. 1000 milliLiter(s) (50 mL/Hr) IV Continuous <Continuous>  dextrose 50% Injectable 12.5 Gram(s) IV Push once  dextrose 50% Injectable 25 Gram(s) IV Push once  dextrose 50% Injectable 25 Gram(s) IV Push once  epoetin cortney-epbx (RETACRIT) Injectable 54739 Unit(s) IV Push <User Schedule>  insulin glargine Injectable (LANTUS) 8 Unit(s) SubCutaneous at bedtime  insulin lispro (HumaLOG) corrective regimen sliding scale   SubCutaneous three times a day before meals  insulin lispro (HumaLOG) corrective regimen sliding scale   SubCutaneous at bedtime  levothyroxine 88 MICROGram(s) Oral daily  melatonin 3 milliGRAM(s) Oral at bedtime  metoprolol tartrate 50 milliGRAM(s) Oral every 8 hours  pantoprazole    Tablet 40 milliGRAM(s) Oral before breakfast  polyethylene glycol 3350 17 Gram(s) Oral daily  sodium chloride 0.9%. 1000 milliLiter(s) (10 mL/Hr) IV Continuous <Continuous>      Physical Exam  General: Patient comfortable in bed  Vital Signs Last 12 Hrs  T(F): 98.3 (08-19-20 @ 05:07), Max: 98.3 (08-19-20 @ 05:07)  HR: 72 (08-19-20 @ 05:07) (72 - 72)  BP: 124/72 (08-19-20 @ 05:07) (124/72 - 124/72)  BP(mean): --  RR: 18 (08-19-20 @ 05:07) (18 - 18)  SpO2: 94% (08-19-20 @ 05:07) (94% - 94%)  Neck: No palpable thyroid nodules.  CVS: S1S2, No murmurs  Respiratory: No wheezing, no crepitations  GI: Abdomen soft, bowel sounds positive  Musculoskeletal:  edema lower extremities.   Skin: No skin rashes, no ecchymosis    Diagnostics

## 2020-08-19 NOTE — PROGRESS NOTE ADULT - PROBLEM SELECTOR PROBLEM 2
ESRD on hemodialysis
Hypothyroidism
ESRD on hemodialysis
Hypothyroidism
Hypothyroidism
ESRD on hemodialysis

## 2020-08-19 NOTE — DISCHARGE NOTE PROVIDER - NSDCMRMEDTOKEN_GEN_ALL_CORE_FT
apixaban 5 mg oral tablet: 1 tab(s) orally every 12 hours  atorvastatin 80 mg oral tablet: 1 tab(s) orally once a day  carvedilol 6.25 mg oral tablet: 1 tab(s) orally 2 times a day  clopidogrel 75 mg oral tablet: 1 tab(s) by gastrostomy tube once a day  Epogen 10,000 units/mL preservative-free injectable solution: injectable once a week with dialysis  furosemide 40 mg oral tablet: 1 tab(s) orally once a day  Levemir FlexPen 100 units/mL subcutaneous solution: 2 unit(s) subcutaneous once a day (at bedtime)  levothyroxine 50 mcg (0.05 mg) oral tablet: 1 tab(s) orally once a day  NovoLOG FlexPen 100 units/mL injectable solution: injectable 3 times a day  3 in the morning and evening and 2 units at night  Liz-Windy oral tablet: 1 tab(s) orally once a day through PEG tube  senna oral tablet: 1 tab(s) orally once a day (at bedtime) through PEG tube  telmisartan 40 mg oral tablet: 1 tab(s) orally once a day acetaminophen 325 mg oral tablet: 2 tab(s) orally every 6 hours, As needed, Mild Pain (1 - 3)  apixaban 5 mg oral tablet: 1 tab(s) orally every 12 hours  aspirin 81 mg oral delayed release tablet: 1 tab(s) orally once a day  atorvastatin 80 mg oral tablet: 1 tab(s) orally once a day  Epogen 10,000 units/mL preservative-free injectable solution: injectable once a week with dialysis  levothyroxine 88 mcg (0.088 mg) oral tablet: 1 tab(s) orally once a day  melatonin 3 mg oral tablet: 1 tab(s) orally once a day (at bedtime)  metoprolol tartrate 50 mg oral tablet: 1 tab(s) orally every 8 hours  NovoLOG FlexPen 100 units/mL injectable solution: injectable 3 times a day  3 in the morning and evening and 2 units at night  pantoprazole 40 mg oral delayed release tablet: 1 tab(s) orally once a day (before a meal)  Liz-Windy oral tablet: 1 tab(s) orally once a day   senna oral tablet: 1 tab(s) orally once a day (at bedtime) PRN for constipation acetaminophen 325 mg oral tablet: 2 tab(s) orally every 6 hours, As needed, Mild Pain (1 - 3)  apixaban 5 mg oral tablet: 1 tab(s) orally every 12 hours  aspirin 81 mg oral delayed release tablet: 1 tab(s) orally once a day  atorvastatin 80 mg oral tablet: 1 tab(s) orally once a day  Epogen 10,000 units/mL preservative-free injectable solution: injectable once a week with dialysis  levothyroxine 88 mcg (0.088 mg) oral tablet: 1 tab(s) orally once a day  melatonin 3 mg oral tablet: 1 tab(s) orally once a day (at bedtime)  metoprolol tartrate 50 mg oral tablet: 1 tab(s) orally every 8 hours  NovoLOG FlexPen 100 units/mL injectable solution: injectable 3 times a day  3 in the morning and evening and 2 units at night  oxyCODONE 5 mg oral tablet: 1 tab(s) orally every 6 hours, As Needed -Severe Pain (7 - 10) MDD:4  pantoprazole 40 mg oral delayed release tablet: 1 tab(s) orally once a day (before a meal)  Liz-Windy oral tablet: 1 tab(s) orally once a day   senna oral tablet: 1 tab(s) orally once a day (at bedtime) PRN for constipation acetaminophen 325 mg oral tablet: 2 tab(s) orally every 6 hours, As needed, Mild Pain (1 - 3)  apixaban 5 mg oral tablet: 1 tab(s) orally every 12 hours  aspirin 81 mg oral delayed release tablet: 1 tab(s) orally once a day  atorvastatin 80 mg oral tablet: 1 tab(s) orally once a day  Epogen 10,000 units/mL preservative-free injectable solution: injectable once a week with dialysis  Levemir 100 units/mL subcutaneous solution: 8 unit(s) subcutaneous once a day (at bedtime)   levothyroxine 88 mcg (0.088 mg) oral tablet: 1 tab(s) orally once a day  melatonin 3 mg oral tablet: 1 tab(s) orally once a day (at bedtime)  metoprolol tartrate 50 mg oral tablet: 1 tab(s) orally every 8 hours  NovoLOG FlexPen 100 units/mL injectable solution: injectable 3 times a day  3 in the morning and evening and 2 units at night  oxyCODONE 5 mg oral tablet: 1 tab(s) orally every 6 hours, As Needed -Severe Pain (7 - 10) MDD:4  pantoprazole 40 mg oral delayed release tablet: 1 tab(s) orally once a day (before a meal)  Liz-Windy oral tablet: 1 tab(s) orally once a day   senna oral tablet: 1 tab(s) orally once a day (at bedtime) PRN for constipation

## 2020-08-19 NOTE — SWALLOW VFSS/MBS ASSESSMENT ADULT - SLP GENERAL OBSERVATIONS
Pt encountered awake and alert, sitting upright and secure in AYDIN chair. On room air. Pt able to follow directives for purposes of evaluation.

## 2020-08-19 NOTE — PROGRESS NOTE ADULT - ASSESSMENT
ESRD on HD ( MWF)  PT from [facility]  s/p cath 8/7  permacath removed on 8/12 prior to sx   s/p CABG  8/13  s/p perma cath on 8/18  s.p HD On 8/17  Plan for HD today via permacath   Consent obtained from son at [phone]  MOnitor BMP    Hyperkalemia  Plan for HD today     Anemia  Hb below goal   IGOR with HD  Monitor at present    HTN  Bp fluctuating however controlled today   Monitor BP  Low salt diet    CKD -BMD  Check PTH  Monitor serum calcium and PO4

## 2020-08-19 NOTE — DISCHARGE NOTE PROVIDER - INSTRUCTIONS
Regular Renal/ Diabetic / DASH diet - low fat, low cholesterol, no added salt. Potassium restricted Regular Renal/ Diabetic / DASH diet - low fat, low cholesterol, no added salt. Potassium restricted    SOFT DIET *

## 2020-08-19 NOTE — SWALLOW VFSS/MBS ASSESSMENT ADULT - SLP PERTINENT HISTORY OF CURRENT PROBLEM
63 yo M w/ PMH HTN, CAD s/p LAD stent 2009, 2019, carotid stenosis, ESRD on HD (MWF Dr Cardenas, [place] [facility]- on transplant list), DM (on IV insulin, controlled, managed by Jamie Corral), CVA c/b status epilepticus requiring intubation and PEG in 12/2019-went to rehab, peg was removed July 2020, dysphagia s/p PEG, atrial fibrillation (on eliquis last dose 8/5/2020), anemia and GI bleed (Feb 2020) who presents today for cardiac angiogram. Patient reports cardiac evaluation for renal transplant- pharm stress test completed on 7/31/2020, revealing abnormal study: there is medium sized, moderate to severe defects in mid to distal anterior, mid to distal anterolateral walls predominantly reversible consistent with infarction with sig.. zackery- infarct ischemia. LVEF 45%. Recommended for cardiac angiogram for further ischemic evaluation. s/p cath 8/7.

## 2020-08-19 NOTE — SWALLOW VFSS/MBS ASSESSMENT ADULT - ADDITIONAL RECOMMENDATIONS
Educated Pt on recommendations. Verbalized understanding. Dysphagia strategies and precautions listed at bedside and provided to Pt to ensure carryover. Educated Pt and his son at bedside on recommendations. Verbalized understanding. Dysphagia strategies and precautions listed at bedside and provided to Pt son to take home for carryover.

## 2020-08-19 NOTE — DISCHARGE NOTE PROVIDER - CARE PROVIDER_API CALL
Julian Guillory  SURGERY  [address] [place] [address]  Phone: [phone]  Fax: [phone]  Scheduled Appointment: 09/03/2020 12:00 PM

## 2020-08-19 NOTE — SWALLOW VFSS/MBS ASSESSMENT ADULT - PHARYNGEAL PHASE COMMENTS
During one trial of thin liquids via teaspoon, Pt with trace silent laryngeal penetration to the vocal cords. A cued cough was effective in clearing residue from vestibule. Suspect this one instance was related to reduced coordination with spoon sips.   During thin liquids via straw, intermittent shallow penetration noted with inconsistent retrieval.   Pt requires a verbal cues to trigger secondary swallow to clear oral and pharyngeal residue.

## 2020-08-19 NOTE — SWALLOW VFSS/MBS ASSESSMENT ADULT - ORAL PHASE
Reduced anterior - posterior transport/Uncontrolled bolus / spillover in nell-pharynx/Uncontrolled bolus / spillover in hypopharynx/Residue in oral cavity/Incomplete tongue to palate contact Uncontrolled bolus / spillover in nell-pharynx/Reduced anterior - posterior transport/Residue in oral cavity/Incomplete tongue to palate contact/Uncontrolled bolus / spillover in hypopharynx Residue in oral cavity/Uncontrolled bolus / spillover in hypopharynx/Reduced anterior - posterior transport/Incomplete tongue to palate contact/Uncontrolled bolus / spillover in nell-pharynx Within functional limits

## 2020-08-19 NOTE — PROGRESS NOTE ADULT - PROVIDER SPECIALTY LIST ADULT
CT Surgery
Critical Care
Critical Care
Endocrinology
Endocrinology
Infectious Disease
Intervent Radiology
Nephrology
CT Surgery
CT Surgery
Endocrinology

## 2020-08-19 NOTE — DISCHARGE NOTE PROVIDER - NSDCFUSCHEDAPPT_GEN_ALL_CORE_FT
CHAD DUNBAR ; 09/24/2020 ; NPP Nephro [address] [place] [address]  CHAD DUNBAR ; 09/03/2020 ; NPP CT Surg [address] CHAD Uriarte ; 09/24/2020 ; NPP Nephro [address]

## 2020-08-19 NOTE — SWALLOW VFSS/MBS ASSESSMENT ADULT - NS SWALLOW VFSS REC ASPIR MON
If noted:  D/C p.o. intake, provide non-oral nutrition/hydration/meds and consult this service @[id number]./change of breathing pattern/fever/gurgly voice/pneumonia/throat clearing/upper respiratory infection/cough

## 2020-08-19 NOTE — SWALLOW VFSS/MBS ASSESSMENT ADULT - DIAGNOSTIC IMPRESSIONS
Pt seen for modified barium swallow study s/p CABG x3 with history of CVA, dysphagia, s/p PEG removal in June 2019. Pt presents with an oral and pharyngeal dysphagia primarily characterized by premature spillage of all consistencies and an inconsistent delay in pharyngeal swallow. During thin liquids via teaspoon, Pt with one episode of trace silent penetration to the vocal cords which is likely related to reduced oral control. Pt provided an additional 8oz thin liquids via cup and straw. With straw sips, Pt with consistent shallow penetration with inconsistent retrieval; although this does not descend on this exam. No penetration or aspiration with single cup sips. A cued secondary swallow is effective in reducing oral and pharyngeal residue. Pt seen for modified barium swallow study s/p CABG x3 with history of CVA, dysphagia, s/p PEG removal in June 2019. Pt presents with an oral and pharyngeal dysphagia primarily characterized by premature spillage of all consistencies and an inconsistent delay in pharyngeal swallow. During thin liquids via teaspoon, Pt with one episode of trace silent penetration to the vocal cords which is likely related to reduced oral control. Pt provided an additional 8oz thin liquids via cup and straw. With straw sips, Pt with consistent shallow penetration with inconsistent retrieval; although this does not descend on this exam. No penetration or aspiration with single cup sips. A cued secondary swallow is effective in reducing oral and pharyngeal residue. Of note, Pt with throat clearing noted during exam was not indicative of penetration/aspiration. Pt seen for modified barium swallow study s/p CABG x3 with history of CVA, dysphagia, s/p PEG removal in June 2019. Pt presents with an oral and pharyngeal dysphagia primarily characterized by premature spillage of all consistencies and an inconsistent delay in pharyngeal swallow. During thin liquids via teaspoon, Pt with one episode of trace silent penetration to the vocal cords which is likely related to reduced oral control. Pt provided an additional 8oz thin liquids via cup and straw. With straw sips, Pt with intermittent shallow penetration with inconsistent retrieval; although this does not appear to descend to vocal cords on this exam. No penetration or aspiration with single cup sips of thin liquid. A cued secondary swallow is effective in reducing oral and pharyngeal residue. Of note, Pt with throat clearing t/o exam which was not indicative of penetration/aspiration.

## 2020-08-20 RX ORDER — CLOPIDOGREL BISULFATE 75 MG/1
75 TABLET, FILM COATED ORAL DAILY
Qty: 90 | Refills: 3 | Status: DISCONTINUED | COMMUNITY
Start: 2019-09-11 | End: 2020-08-20

## 2020-08-20 RX ORDER — LOSARTAN POTASSIUM 100 MG/1
100 TABLET, FILM COATED ORAL DAILY
Qty: 90 | Refills: 3 | Status: DISCONTINUED | COMMUNITY
Start: 2019-06-12 | End: 2020-08-20

## 2020-08-20 RX ORDER — CARVEDILOL 12.5 MG/1
12.5 TABLET, FILM COATED ORAL
Qty: 180 | Refills: 2 | Status: DISCONTINUED | COMMUNITY
Start: 2019-06-12 | End: 2020-08-20

## 2020-08-20 RX ORDER — LEVETIRACETAM 500 MG/1
500 TABLET, FILM COATED ORAL TWICE DAILY
Qty: 60 | Refills: 5 | Status: DISCONTINUED | COMMUNITY
Start: 2020-05-14 | End: 2020-08-20

## 2020-08-20 RX ORDER — TELMISARTAN 80 MG/1
80 TABLET ORAL DAILY
Qty: 1 | Refills: 3 | Status: DISCONTINUED | COMMUNITY
Start: 2020-05-14 | End: 2020-08-20

## 2020-08-21 ENCOUNTER — TRANSCRIPTION ENCOUNTER (OUTPATIENT)
Age: 65
End: 2020-08-21

## 2020-08-22 ENCOUNTER — INPATIENT (INPATIENT)
Facility: HOSPITAL | Age: 65
LOS: 4 days | Discharge: HOME | DRG: 186 | End: 2020-08-26
Attending: THORACIC SURGERY (CARDIOTHORACIC VASCULAR SURGERY) | Admitting: THORACIC SURGERY (CARDIOTHORACIC VASCULAR SURGERY)
Payer: MEDICARE

## 2020-08-22 VITALS
HEIGHT: 71 IN | TEMPERATURE: 99 F | OXYGEN SATURATION: 97 % | DIASTOLIC BLOOD PRESSURE: 70 MMHG | RESPIRATION RATE: 20 BRPM | HEART RATE: 60 BPM | SYSTOLIC BLOOD PRESSURE: 157 MMHG | WEIGHT: 156.53 LBS

## 2020-08-22 DIAGNOSIS — N18.6 END STAGE RENAL DISEASE: ICD-10-CM

## 2020-08-22 DIAGNOSIS — Z29.9 ENCOUNTER FOR PROPHYLACTIC MEASURES, UNSPECIFIED: ICD-10-CM

## 2020-08-22 DIAGNOSIS — Z95.1 PRESENCE OF AORTOCORONARY BYPASS GRAFT: Chronic | ICD-10-CM

## 2020-08-22 DIAGNOSIS — E87.70 FLUID OVERLOAD, UNSPECIFIED: ICD-10-CM

## 2020-08-22 DIAGNOSIS — E11.9 TYPE 2 DIABETES MELLITUS WITHOUT COMPLICATIONS: ICD-10-CM

## 2020-08-22 DIAGNOSIS — E03.9 HYPOTHYROIDISM, UNSPECIFIED: ICD-10-CM

## 2020-08-22 DIAGNOSIS — I48.91 UNSPECIFIED ATRIAL FIBRILLATION: ICD-10-CM

## 2020-08-22 DIAGNOSIS — J90 PLEURAL EFFUSION, NOT ELSEWHERE CLASSIFIED: ICD-10-CM

## 2020-08-22 DIAGNOSIS — Z95.1 PRESENCE OF AORTOCORONARY BYPASS GRAFT: ICD-10-CM

## 2020-08-22 LAB
ALBUMIN SERPL ELPH-MCNC: 3.9 G/DL — SIGNIFICANT CHANGE UP (ref 3.3–5)
ALP SERPL-CCNC: 418 U/L — HIGH (ref 40–120)
ALT FLD-CCNC: 141 U/L — HIGH (ref 10–45)
ANION GAP SERPL CALC-SCNC: 17 MMOL/L — SIGNIFICANT CHANGE UP (ref 5–17)
AST SERPL-CCNC: 46 U/L — HIGH (ref 10–40)
BASOPHILS # BLD AUTO: 0.03 K/UL — SIGNIFICANT CHANGE UP (ref 0–0.2)
BASOPHILS NFR BLD AUTO: 0.3 % — SIGNIFICANT CHANGE UP (ref 0–2)
BILIRUB SERPL-MCNC: 0.5 MG/DL — SIGNIFICANT CHANGE UP (ref 0.2–1.2)
BUN SERPL-MCNC: 54 MG/DL — HIGH (ref 7–23)
CALCIUM SERPL-MCNC: 9.3 MG/DL — SIGNIFICANT CHANGE UP (ref 8.4–10.5)
CHLORIDE SERPL-SCNC: 93 MMOL/L — LOW (ref 96–108)
CO2 SERPL-SCNC: 25 MMOL/L — SIGNIFICANT CHANGE UP (ref 22–31)
CREAT SERPL-MCNC: 5.29 MG/DL — HIGH (ref 0.5–1.3)
EOSINOPHIL # BLD AUTO: 0.32 K/UL — SIGNIFICANT CHANGE UP (ref 0–0.5)
EOSINOPHIL NFR BLD AUTO: 3.7 % — SIGNIFICANT CHANGE UP (ref 0–6)
GLUCOSE BLDC GLUCOMTR-MCNC: 115 MG/DL — HIGH (ref 70–99)
GLUCOSE BLDC GLUCOMTR-MCNC: 121 MG/DL — HIGH (ref 70–99)
GLUCOSE BLDC GLUCOMTR-MCNC: 187 MG/DL — HIGH (ref 70–99)
GLUCOSE SERPL-MCNC: 240 MG/DL — HIGH (ref 70–99)
HCT VFR BLD CALC: 26.8 % — LOW (ref 39–50)
HGB BLD-MCNC: 8.4 G/DL — LOW (ref 13–17)
IMM GRANULOCYTES NFR BLD AUTO: 0.7 % — SIGNIFICANT CHANGE UP (ref 0–1.5)
LYMPHOCYTES # BLD AUTO: 1.29 K/UL — SIGNIFICANT CHANGE UP (ref 1–3.3)
LYMPHOCYTES # BLD AUTO: 14.8 % — SIGNIFICANT CHANGE UP (ref 13–44)
MAGNESIUM SERPL-MCNC: 2.1 MG/DL — SIGNIFICANT CHANGE UP (ref 1.6–2.6)
MCHC RBC-ENTMCNC: 28.6 PG — SIGNIFICANT CHANGE UP (ref 27–34)
MCHC RBC-ENTMCNC: 31.3 GM/DL — LOW (ref 32–36)
MCV RBC AUTO: 91.2 FL — SIGNIFICANT CHANGE UP (ref 80–100)
MONOCYTES # BLD AUTO: 0.94 K/UL — HIGH (ref 0–0.9)
MONOCYTES NFR BLD AUTO: 10.8 % — SIGNIFICANT CHANGE UP (ref 2–14)
NEUTROPHILS # BLD AUTO: 6.08 K/UL — SIGNIFICANT CHANGE UP (ref 1.8–7.4)
NEUTROPHILS NFR BLD AUTO: 69.7 % — SIGNIFICANT CHANGE UP (ref 43–77)
NRBC # BLD: 0 /100 WBCS — SIGNIFICANT CHANGE UP (ref 0–0)
PLATELET # BLD AUTO: 342 K/UL — SIGNIFICANT CHANGE UP (ref 150–400)
POTASSIUM SERPL-MCNC: 4.7 MMOL/L — SIGNIFICANT CHANGE UP (ref 3.5–5.3)
POTASSIUM SERPL-SCNC: 4.7 MMOL/L — SIGNIFICANT CHANGE UP (ref 3.5–5.3)
PROT SERPL-MCNC: 7.3 G/DL — SIGNIFICANT CHANGE UP (ref 6–8.3)
RBC # BLD: 2.94 M/UL — LOW (ref 4.2–5.8)
RBC # FLD: 18.4 % — HIGH (ref 10.3–14.5)
SARS-COV-2 RNA SPEC QL NAA+PROBE: SIGNIFICANT CHANGE UP
SODIUM SERPL-SCNC: 135 MMOL/L — SIGNIFICANT CHANGE UP (ref 135–145)
TROPONIN T, HIGH SENSITIVITY RESULT: 235 NG/L — HIGH (ref 0–51)
WBC # BLD: 8.72 K/UL — SIGNIFICANT CHANGE UP (ref 3.8–10.5)
WBC # FLD AUTO: 8.72 K/UL — SIGNIFICANT CHANGE UP (ref 3.8–10.5)

## 2020-08-22 PROCEDURE — 99223 1ST HOSP IP/OBS HIGH 75: CPT | Mod: 25

## 2020-08-22 PROCEDURE — 99291 CRITICAL CARE FIRST HOUR: CPT | Mod: CS,GC

## 2020-08-22 PROCEDURE — 93010 ELECTROCARDIOGRAM REPORT: CPT | Mod: GC

## 2020-08-22 PROCEDURE — 32557 INSERT CATH PLEURA W/ IMAGE: CPT | Mod: 78

## 2020-08-22 PROCEDURE — 71045 X-RAY EXAM CHEST 1 VIEW: CPT | Mod: 26,76

## 2020-08-22 PROCEDURE — 93306 TTE W/DOPPLER COMPLETE: CPT | Mod: 26

## 2020-08-22 RX ORDER — ASPIRIN/CALCIUM CARB/MAGNESIUM 324 MG
81 TABLET ORAL DAILY
Refills: 0 | Status: DISCONTINUED | OUTPATIENT
Start: 2020-08-22 | End: 2020-08-26

## 2020-08-22 RX ORDER — INSULIN LISPRO 100/ML
VIAL (ML) SUBCUTANEOUS AT BEDTIME
Refills: 0 | Status: DISCONTINUED | OUTPATIENT
Start: 2020-08-22 | End: 2020-08-26

## 2020-08-22 RX ORDER — PANTOPRAZOLE SODIUM 20 MG/1
40 TABLET, DELAYED RELEASE ORAL
Refills: 0 | Status: DISCONTINUED | OUTPATIENT
Start: 2020-08-22 | End: 2020-08-26

## 2020-08-22 RX ORDER — INSULIN LISPRO 100/ML
3 VIAL (ML) SUBCUTANEOUS
Refills: 0 | Status: DISCONTINUED | OUTPATIENT
Start: 2020-08-22 | End: 2020-08-26

## 2020-08-22 RX ORDER — HEPARIN SODIUM 5000 [USP'U]/ML
5000 INJECTION INTRAVENOUS; SUBCUTANEOUS EVERY 8 HOURS
Refills: 0 | Status: DISCONTINUED | OUTPATIENT
Start: 2020-08-22 | End: 2020-08-22

## 2020-08-22 RX ORDER — FUROSEMIDE 40 MG
40 TABLET ORAL DAILY
Refills: 0 | Status: DISCONTINUED | OUTPATIENT
Start: 2020-08-22 | End: 2020-08-24

## 2020-08-22 RX ORDER — OXYCODONE HYDROCHLORIDE 5 MG/1
5 TABLET ORAL EVERY 4 HOURS
Refills: 0 | Status: DISCONTINUED | OUTPATIENT
Start: 2020-08-22 | End: 2020-08-26

## 2020-08-22 RX ORDER — SENNA PLUS 8.6 MG/1
2 TABLET ORAL AT BEDTIME
Refills: 0 | Status: DISCONTINUED | OUTPATIENT
Start: 2020-08-22 | End: 2020-08-26

## 2020-08-22 RX ORDER — FUROSEMIDE 40 MG
80 TABLET ORAL ONCE
Refills: 0 | Status: COMPLETED | OUTPATIENT
Start: 2020-08-22 | End: 2020-08-22

## 2020-08-22 RX ORDER — FUROSEMIDE 40 MG
80 TABLET ORAL ONCE
Refills: 0 | Status: DISCONTINUED | OUTPATIENT
Start: 2020-08-22 | End: 2020-08-22

## 2020-08-22 RX ORDER — POLYETHYLENE GLYCOL 3350 17 G/17G
17 POWDER, FOR SOLUTION ORAL DAILY
Refills: 0 | Status: DISCONTINUED | OUTPATIENT
Start: 2020-08-22 | End: 2020-08-26

## 2020-08-22 RX ORDER — CHLORHEXIDINE GLUCONATE 213 G/1000ML
1 SOLUTION TOPICAL
Refills: 0 | Status: DISCONTINUED | OUTPATIENT
Start: 2020-08-22 | End: 2020-08-25

## 2020-08-22 RX ORDER — INSULIN LISPRO 100/ML
VIAL (ML) SUBCUTANEOUS
Refills: 0 | Status: DISCONTINUED | OUTPATIENT
Start: 2020-08-22 | End: 2020-08-26

## 2020-08-22 RX ORDER — LEVOTHYROXINE SODIUM 125 MCG
88 TABLET ORAL DAILY
Refills: 0 | Status: DISCONTINUED | OUTPATIENT
Start: 2020-08-22 | End: 2020-08-26

## 2020-08-22 RX ORDER — METOPROLOL TARTRATE 50 MG
50 TABLET ORAL EVERY 8 HOURS
Refills: 0 | Status: DISCONTINUED | OUTPATIENT
Start: 2020-08-22 | End: 2020-08-23

## 2020-08-22 RX ORDER — INSULIN GLARGINE 100 [IU]/ML
8 INJECTION, SOLUTION SUBCUTANEOUS AT BEDTIME
Refills: 0 | Status: DISCONTINUED | OUTPATIENT
Start: 2020-08-22 | End: 2020-08-26

## 2020-08-22 RX ORDER — LANOLIN ALCOHOL/MO/W.PET/CERES
3 CREAM (GRAM) TOPICAL AT BEDTIME
Refills: 0 | Status: DISCONTINUED | OUTPATIENT
Start: 2020-08-22 | End: 2020-08-26

## 2020-08-22 RX ORDER — ATORVASTATIN CALCIUM 80 MG/1
80 TABLET, FILM COATED ORAL AT BEDTIME
Refills: 0 | Status: DISCONTINUED | OUTPATIENT
Start: 2020-08-22 | End: 2020-08-26

## 2020-08-22 RX ORDER — INSULIN LISPRO 100/ML
2 VIAL (ML) SUBCUTANEOUS
Refills: 0 | Status: DISCONTINUED | OUTPATIENT
Start: 2020-08-22 | End: 2020-08-26

## 2020-08-22 RX ORDER — APIXABAN 2.5 MG/1
5 TABLET, FILM COATED ORAL EVERY 12 HOURS
Refills: 0 | Status: DISCONTINUED | OUTPATIENT
Start: 2020-08-22 | End: 2020-08-23

## 2020-08-22 RX ADMIN — OXYCODONE HYDROCHLORIDE 5 MILLIGRAM(S): 5 TABLET ORAL at 19:00

## 2020-08-22 RX ADMIN — Medication 81 MILLIGRAM(S): at 12:53

## 2020-08-22 RX ADMIN — OXYCODONE HYDROCHLORIDE 5 MILLIGRAM(S): 5 TABLET ORAL at 23:07

## 2020-08-22 RX ADMIN — Medication 3 MILLIGRAM(S): at 22:58

## 2020-08-22 RX ADMIN — SENNA PLUS 2 TABLET(S): 8.6 TABLET ORAL at 22:58

## 2020-08-22 RX ADMIN — APIXABAN 5 MILLIGRAM(S): 2.5 TABLET, FILM COATED ORAL at 22:57

## 2020-08-22 RX ADMIN — Medication 1 TABLET(S): at 22:43

## 2020-08-22 RX ADMIN — APIXABAN 5 MILLIGRAM(S): 2.5 TABLET, FILM COATED ORAL at 09:22

## 2020-08-22 RX ADMIN — OXYCODONE HYDROCHLORIDE 5 MILLIGRAM(S): 5 TABLET ORAL at 19:29

## 2020-08-22 RX ADMIN — Medication 50 MILLIGRAM(S): at 22:57

## 2020-08-22 RX ADMIN — Medication 1: at 12:36

## 2020-08-22 RX ADMIN — Medication 3 UNIT(S): at 12:37

## 2020-08-22 RX ADMIN — Medication 3 UNIT(S): at 09:08

## 2020-08-22 RX ADMIN — PANTOPRAZOLE SODIUM 40 MILLIGRAM(S): 20 TABLET, DELAYED RELEASE ORAL at 09:22

## 2020-08-22 RX ADMIN — Medication 80 MILLIGRAM(S): at 02:11

## 2020-08-22 RX ADMIN — ATORVASTATIN CALCIUM 80 MILLIGRAM(S): 80 TABLET, FILM COATED ORAL at 22:58

## 2020-08-22 RX ADMIN — Medication 1: at 09:04

## 2020-08-22 RX ADMIN — INSULIN GLARGINE 8 UNIT(S): 100 INJECTION, SOLUTION SUBCUTANEOUS at 22:58

## 2020-08-22 RX ADMIN — OXYCODONE HYDROCHLORIDE 5 MILLIGRAM(S): 5 TABLET ORAL at 23:52

## 2020-08-22 RX ADMIN — Medication 40 MILLIGRAM(S): at 09:22

## 2020-08-22 RX ADMIN — Medication 50 MILLIGRAM(S): at 09:22

## 2020-08-22 RX ADMIN — Medication 2 UNIT(S): at 18:05

## 2020-08-22 RX ADMIN — Medication 88 MICROGRAM(S): at 09:22

## 2020-08-22 NOTE — H&P ADULT - PROBLEM SELECTOR PLAN 5
Continue Lantus 8u qhs and Humalog 3u before breakfast, 3u before lunch and 2u before dinner   Check FS TID and cover w/ ISS prn   Consistent carbohydrate diet

## 2020-08-22 NOTE — ED PROVIDER NOTE - PROGRESS NOTE DETAILS
Rojelio Fang, PGY 3: stable, cardiac showed afib, no chest pain, rpt ekg was done Rojelio Fang, PGY 3: spoke with the CT PA and will admit to CT surg team.

## 2020-08-22 NOTE — CONSULT NOTE ADULT - ASSESSMENT
65 y/o M w/ PMHx of HTN, CAD (s/p LAD stent in 2009 & 2019, now s/p off-pump C3L w/ Dr. Guillory on 8/13/20, discharged maxime 8/19/20), carotid stenosis, ESRD on HD (MWF Dr. Cardenas, [place] [facility]- on transplant list), DM (on insulin, managed by Jamie Corral), CVA c/b status epilepticus requiring intubation and PEG in 12/2019 (went to rehab, PEG was removed July 2020), dysphagia s/p PEG, atrial fibrillation (on Eliquis), anemia and GI bleed (Feb 2020) who presented to [facility] ED on 8/22 c/o worsening COLE and b/l LE swelling since he was discharged home on 8/19/20.     ESRD on HD ( MWF)  PT from [facility]  s/p cath 8/7  s/p CABG  8/13  s/p perma cath on 8/18    Consent obtained from son at [phone]  MOnitor BMP      Anemia  Hb below goal   IGOR with HD  Monitor at present    HTN  Bp stable  Monitor BP  Low salt diet    CKD -BMD  Check PTH  Monitor serum calcium and PO4 63 y/o M w/ PMHx of HTN, CAD (s/p LAD stent in 2009 & 2019, now s/p off-pump C3L w/ Dr. Guillory on 8/13/20, discharged maxime 8/19/20), carotid stenosis, ESRD on HD (MWF Dr. Cardenas, [place] [facility]- on transplant list), DM (on insulin, managed by Jamie Corral), CVA c/b status epilepticus requiring intubation and PEG in 12/2019 (went to rehab, PEG was removed July 2020), dysphagia s/p PEG, atrial fibrillation (on Eliquis), anemia and GI bleed (Feb 2020) who presented to [facility] ED on 8/22 c/o worsening COLE and b/l LE swelling since he was discharged home on 8/19/20.     ESRD on HD ( MWF)  PT from [facility]  s/p cath 8/7  s/p CABG  8/13  s/p perma cath on 8/18  Plan for HD today for UF  Consent obtained from son at [phone]  MOnitor BMP      Anemia  Hb below goal   IGOR with HD  Monitor at present    HTN  Bp stable  Monitor BP  Low salt diet    CKD -BMD  Check PTH  Monitor serum calcium and PO4

## 2020-08-22 NOTE — H&P ADULT - ASSESSMENT
65 y/o M w/ PMHx of HTN, CAD (s/p LAD stent in 2009 & 2019, now s/p off-pump C3L w/ Dr. Guillory on 8/13/20, discharged maxime 8/19/20), carotid stenosis, ESRD on HD (MWF Dr. Cardenas, [place] [facility]- on transplant list), DM (on insulin, managed by Jamie Corral), CVA c/b status epilepticus requiring intubation and PEG in 12/2019 (went to rehab, PEG was removed July 2020), dysphagia s/p PEG, atrial fibrillation (on Eliquis), anemia and GI bleed (Feb 2020) who presented to [facility] ED on 8/22 c/o worsening COLE and b/l LE swelling since he was discharged home on 8/19/20. Pt resumed pre-op dose of Lasix 40 mg QD on 8/20. Pt was last dialyzed on 8/21 and had 2.3L removed, after which his symptoms persisted. CXR showed L pleural effusion that was present on CXR performed 8/16/20.     Pt seen and evaluated at bedside in ED - VSS, NAD. SaO2 100% on RA, SBP 140s-150s, afib 80s-90s. Discussed with CT surgery attending on call, Dr. Hall - will admit to CTS for further evaluation and management. Will consult renal, Dr. Rey.

## 2020-08-22 NOTE — H&P ADULT - PROBLEM SELECTOR PLAN 3
Consult renal, Dr. Rey  HD M/W/F - last dialyzed 8/21  Avoid nephrotoxic agents   Daily BMP to trend BUN/Cr

## 2020-08-22 NOTE — ED ADULT NURSE REASSESSMENT NOTE - NS ED NURSE REASSESS COMMENT FT1
Pt resting comfortably in bed at this time. Admitted to surgery, aware of plan of care. VS as documented. On bedside cardiac monitor. Bed locked and lowered. Comfort and safety measures maintained.

## 2020-08-22 NOTE — ED ADULT NURSE NOTE - CHPI ED NUR SYMPTOMS NEG
no chills/no chest pain/no diaphoresis/no fever/no nausea/no congestion/no syncope/no back pain/no dizziness

## 2020-08-22 NOTE — PROGRESS NOTE ADULT - ASSESSMENT
63 yo M w/ PMH HTN, CAD s/p LAD stent 2009, 2019, carotid stenosis, ESRD on HD (MWF Dr Cardenas, [place] [facility]- on transplant list), DM (on IV insulin, controlled, managed by Jamie Corral), CVA c/b status epilepticus requiring intubation and PEG in 12/2019-went to rehab, peg was removed July 2020, dysphagia s/p PEG, atrial fibrillation (on eliquis last dose 8/5/2020), anemia and GI bleed (Feb 2020) who presents today for cardiac angiogram. Patient reports cardiac evaluation for renal transplant- s/p cath-TVD-plan for CABG 8/13/20 with Dr. Guillory.     Son [phone] (07 Aug 2020 06:50)    8/12 VSS; AFB x3 Negative - HD today, plan to remove permacath by IR after HD. Plan for OR with Dr. Guillory for CABG tomorrow 8/13/20.     8/13/20 Off-Pump CABG x 3: LIMA-LAD, LSVG-OM1, LSVG-OM2  Urgent post op dialysis for hyperkalemia, permacath d/c preop, temp catheter placed  He was on a dysphagia diet at home preop, s&s consult pending.  Afib preop, on eliquis, resumed, beta blockers started.  Transferred to sdu.  8/15 The patient has  a central line that will need to be removed, hold eliquis.  Will need a permacath , ? monday, will need to contact IR Monday, consult placed in sunrise)  H/o dysphagia, placed on dys 3 diet thickened liquids , speech and swallow following  + strongyloide antibody as per ID, 2 days of iverrectin( potential renal transplant candidate)  8/16 VSS - glucose 144 this am - rounds made w/ Dr. Whitney this am - can isolate Pw's & transfer to floor  8/17 VSS - NPO for permacath - call from IR Dr. Barron.  d/t increased BUn -100 - will place Shiley today & transfer over to Permacath later in the week.  pt is NPO   8/18 VSS- permacath placed in IR this am.  Bleeding from site on arrival to floor . IR PA notified - pressure applied - will monitor - may keep pt. one more day to monitor permacath site.   8/22 read fluid overload, suspected pl effusion  L pigtail>700  ml serosang  Extra HD today    Abd  U/S  elevated LFTs

## 2020-08-22 NOTE — H&P ADULT - NSHPOUTPATIENTPROVIDERS_GEN_ALL_CORE
Renal: Dr. Cardenas, [facility] [place]  Endo: Dr. Jamie Corral Renal: Dr. Cardenas, [facility] [place]  PCP: Dr. Jamie Corral

## 2020-08-22 NOTE — ED PROVIDER NOTE - PSH
History of insertion of stent into coronary artery bypass graft  2009 and 2019  S/P CABG x 3  off pump C3L on 8/13/20

## 2020-08-22 NOTE — H&P ADULT - NSICDXPASTSURGICALHX_GEN_ALL_CORE_FT
PAST SURGICAL HISTORY:  History of insertion of stent into coronary artery bypass graft 2009 and 2019    S/P CABG x 3 off pump C3L on 8/13/20

## 2020-08-22 NOTE — ED ADULT NURSE NOTE - OBJECTIVE STATEMENT
64 year old male presents to ED via walk-in complaining of SOB x1 week. PMH CABG x 1 week ago, CVA, DM, HTN, CAD,  chronic renal sufficiency. Per son at bedside, who is cardiologist, ever since CABG x1 week ago, has been more and more short of breath. Received dialysis MWF, last dialysis today. Took 2L of fluids off today, and received 80mg of lasix orally. Upon assessment A&O x4. +2 pitting edema on bilateral lower extremities. Scar noted from recent CABG procedure, no s/s of infection, no redness/drainage noted. Breathing spontaneously, non labored non tachypneic 100% oxygen saturation on room air. Pt denies chest pain, n/v/d, abdominal pain, fever, chills. US completed at bedside by MD Fang. US IV access placed by MD Fang. On bedside cardiac monitor. EKG completed. Bed locked and lowered Comfort and safety measures maintained.

## 2020-08-22 NOTE — H&P ADULT - NSHPPHYSICALEXAM_GEN_ALL_CORE
Vital Signs Last 24 Hrs  T(C): 36.9 (22 Aug 2020 04:49), Max: 37.1 (22 Aug 2020 00:01)  T(F): 98.5 (22 Aug 2020 04:49), Max: 98.7 (22 Aug 2020 00:01)  HR: 93 (22 Aug 2020 04:49) (54 - 93)  BP: 153/88 (22 Aug 2020 04:49) (148/98 - 157/70)  RR: 17 (22 Aug 2020 04:49) (15 - 20)  SpO2: 97% (22 Aug 2020 04:49) (97% - 100%)    General: NAD  HEENT:  NC/AT  Neuro: A&Ox4, speech clear, no focal deficits noted  Respiratory: diminished at L base, otherwise CTA b/l no wheezes, rales or rhonchi   Cardiovascular: irregular, (+) S1/S2, no murmur appreciated  GI: Abd soft, NT/ND, (+) BSx4Q (+) BM  Peripheral Vascular:  2+ LE edema b/l, 2+ peripheral pulses b/l, warm and well perfused distally  Musculoskeletal: B/L UE and LE 5/5 strength   Psychiatric: Normal mood, normal affect observed  Skin: Normal exam to inspection and palpation. no bleeding, no hematoma. Vital Signs Last 24 Hrs  T(C): 36.9 (22 Aug 2020 04:49), Max: 37.1 (22 Aug 2020 00:01)  T(F): 98.5 (22 Aug 2020 04:49), Max: 98.7 (22 Aug 2020 00:01)  HR: 93 (22 Aug 2020 04:49) (54 - 93)  BP: 153/88 (22 Aug 2020 04:49) (148/98 - 157/70)  RR: 17 (22 Aug 2020 04:49) (15 - 20)  SpO2: 97% (22 Aug 2020 04:49) (97% - 100%)    General: NAD  HEENT:  NC/AT  Neuro: A&Ox4, speech clear, no focal deficits noted  Respiratory: diminished at L base, otherwise CTA b/l no wheezes, rales or rhonchi   Cardiovascular: irregular, (+) S1/S2, no murmur appreciated, LIJ permacath w/ dressing in place - CDI  Sternal wound: MSI healing well - CLARITZA, CDI  GI: Abd soft, NT/ND, (+) BSx4Q (+) BM  Peripheral Vascular:  2+ LE edema b/l, 2+ peripheral pulses b/l, warm and well perfused distally  Musculoskeletal: B/L UE and LE 5/5 strength   Psychiatric: Normal mood, normal affect observed  Skin: Normal exam to inspection and palpation. no bleeding, no hematoma.

## 2020-08-22 NOTE — CONSULT NOTE ADULT - SUBJECTIVE AND OBJECTIVE BOX
[facility]   MD NINA MASON MD RUORU WONG, PA    TEL:  OFFICE: [phone]  DR HSU CELL: [phone]  RAS MORELOS CELL: [phone]  DR. MARQUEZ CELL: [phone]  DR. ALEX CELl: [phone]    FROM 5 PM- 7 AM PLEASE CALL ANSWERING SERVICE AT [phone]    -- INITIAL RENAL CONSULT NOTE  --------------------------------------------------------------------------------  HPI:  65 y/o M w/ PMHx of HTN, CAD (s/p LAD stent in 2009 & 2019, now s/p off-pump C3L w/ Dr. Guillory on 8/13/20, discharged maxime 8/19/20), carotid stenosis, ESRD on HD (MWF Dr. Cardenas, [facility]- on transplant list), DM (on insulin, managed by Jamie Corral), CVA c/b status epilepticus requiring intubation and PEG in 12/2019 (went to rehab, PEG was removed July 2020), dysphagia s/p PEG, atrial fibrillation (on Eliquis), anemia and GI bleed (Feb 2020) who presented to [facility] ED on 8/22 c/o worsening COLE and b/l LE swelling since he was discharged home on 8/19/20.       PAST HISTORY  --------------------------------------------------------------------------------  PAST MEDICAL & SURGICAL HISTORY:  ESRD on dialysis: M/W/F  Intubation of airway performed without difficulty: Dec 2019  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    FAMILY HISTORY:  No pertinent family history in first degree relatives    PAST SOCIAL HISTORY:    ALLERGIES & MEDICATIONS  --------------------------------------------------------------------------------  Allergies    No Known Allergies    Intolerances      Standing Inpatient Medications  apixaban 5 milliGRAM(s) Oral every 12 hours  aspirin enteric coated 81 milliGRAM(s) Oral daily  atorvastatin 80 milliGRAM(s) Oral at bedtime  furosemide    Tablet 40 milliGRAM(s) Oral daily  insulin glargine Injectable (LANTUS) 8 Unit(s) SubCutaneous at bedtime  insulin lispro (HumaLOG) corrective regimen sliding scale   SubCutaneous three times a day before meals  insulin lispro (HumaLOG) corrective regimen sliding scale   SubCutaneous at bedtime  insulin lispro Injectable (HumaLOG) 3 Unit(s) SubCutaneous before breakfast  insulin lispro Injectable (HumaLOG) 3 Unit(s) SubCutaneous before lunch  insulin lispro Injectable (HumaLOG) 2 Unit(s) SubCutaneous before dinner  levothyroxine 88 MICROGram(s) Oral daily  melatonin 3 milliGRAM(s) Oral at bedtime  metoprolol tartrate 50 milliGRAM(s) Oral every 8 hours  Nephro-macie 1 Tablet(s) Oral daily  pantoprazole    Tablet 40 milliGRAM(s) Oral before breakfast  senna 2 Tablet(s) Oral at bedtime    PRN Inpatient Medications      REVIEW OF SYSTEMS  --------------------------------------------------------------------------------  Gen: No fevers/chills  Skin: No rashes  Head/Eyes/Ears: Normal hearing,  Normal vision   Respiratory: No dyspnea, cough  CV: No chest pain  GI: No abdominal pain, diarrhea, constipation, nausea, vomiting  : No dysuria, hematuria  MSK: No  edema  Heme: No easy bruising or bleeding  Psych: No significant depression    All other systems were reviewed and are negative, except as noted.    VITALS/PHYSICAL EXAM  --------------------------------------------------------------------------------  T(C): 36.3 (08-22-20 @ 06:36), Max: 37.1 (08-22-20 @ 00:01)  HR: 90 (08-22-20 @ 06:36) (54 - 93)  BP: 132/85 (08-22-20 @ 06:36) (132/85 - 157/70)  RR: 18 (08-22-20 @ 06:36) (15 - 20)  SpO2: 98% (08-22-20 @ 06:36) (97% - 100%)  Wt(kg): --  Height (cm): 177.8 (08-22-20 @ 06:36)  Weight (kg): 70 (08-22-20 @ 06:36)  BMI (kg/m2): 22.1 (08-22-20 @ 06:36)  BSA (m2): 1.87 (08-22-20 @ 06:36)      Physical Exam:  	Gen: NAD  	HEENT: MMM  	Pulm: decreased breath sounds  B/L  	CV: S1S2  	Abd: Soft, +BS   	Ext: No LE edema B/L  	Neuro: Awake, alert  	Skin: Warm and dry  	Vascular access: perma cath          : no  tucker  LABS/STUDIES  --------------------------------------------------------------------------------              8.4    8.72  >-----------<  342      [08-22-20 @ 02:25]              26.8     135  |  93  |  54  ----------------------------<  240      [08-22-20 @ 02:25]  4.7   |  25  |  5.29        Ca     9.3     [08-22-20 @ 02:25]      Mg     2.1     [08-22-20 @ 02:25]    TPro  7.3  /  Alb  3.9  /  TBili  0.5  /  DBili  x   /  AST  46  /  ALT  141  /  AlkPhos  418  [08-22-20 @ 02:25]          Creatinine Trend:  SCr 5.29 [08-22 @ 02:25]  SCr 7.42 [08-19 @ 04:54]  SCr 6.17 [08-18 @ 05:26]  SCr 8.56 [08-17 @ 05:59]  SCr 7.41 [08-16 @ 05:09]    Urinalysis - [01-02-20 @ 20:34]      Color Light Yellow / Appearance Slightly Turbid / SG 1.012 / pH 7.0      Gluc 100 mg/dL / Ketone Negative  / Bili Negative / Urobili Negative       Blood Small / Protein 300 mg/dL / Leuk Est Moderate / Nitrite Negative      RBC 1 / WBC 46 / Hyaline 1 / Gran  / Sq Epi  / Non Sq Epi 0 / Bacteria Many      Iron 45, TIBC 145, %sat 31      [12-26-19 @ 23:24]  Ferritin 1369      [12-26-19 @ 23:24]  PTH -- (Ca 8.4)      [02-21-20 @ 08:54]   93  HbA1c 6.0      [02-21-20 @ 08:53]  TSH 8.13      [08-16-20 @ 07:53]  Lipid: chol 124, TG 59, HDL 64, LDL 47      [08-08-20 @ 00:47]    HBsAb >1000.0      [12-14-19 @ 23:48]  HBsAb Reactive      [03-13-17 @ 19:00]  HBsAg Nonreact      [12-14-19 @ 23:48]  HBcAb Reactive      [06-03-19 @ 15:57]  HCV 0.24, Nonreact      [08-09-20 @ 19:37]  HIV Nonreact      [08-09-20 @ 19:26] [facility]   MD NINA MASON MD RUORU WONG, PA    TEL:  OFFICE: [phone]  DR HSU CELL: [phone]  RAS MORELOS CELL: [phone]  DR. MARQUEZ CELL: [phone]  DR. ALEX CELl: [phone]    FROM 5 PM- 7 AM PLEASE CALL ANSWERING SERVICE AT [phone]    -- INITIAL RENAL CONSULT NOTE  --------------------------------------------------------------------------------  HPI:  63 y/o M w/ PMHx of HTN, CAD (s/p LAD stent in 2009 & 2019, now s/p off-pump C3L w/ Dr. Guillory on 8/13/20, discharged maxime 8/19/20), carotid stenosis, ESRD on HD (MWF Dr. Cardenas, [place] [facility]- on transplant list), DM (on insulin, managed by Jamie Corral), CVA c/b status epilepticus requiring intubation and PEG in 12/2019 (went to rehab, PEG was removed July 2020), dysphagia s/p PEG, atrial fibrillation (on Eliquis), anemia and GI bleed (Feb 2020) who presented to [facility] ED on 8/22 c/o worsening COLE and b/l LE swelling since he was discharged home on 8/19/20.       PAST HISTORY  --------------------------------------------------------------------------------  PAST MEDICAL & SURGICAL HISTORY:  ESRD on dialysis: M/W/F  Intubation of airway performed without difficulty: Dec 2019  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    FAMILY HISTORY:  No pertinent family history in first degree relatives    PAST SOCIAL HISTORY:    ALLERGIES & MEDICATIONS  --------------------------------------------------------------------------------  Allergies    No Known Allergies    Intolerances      Standing Inpatient Medications  apixaban 5 milliGRAM(s) Oral every 12 hours  aspirin enteric coated 81 milliGRAM(s) Oral daily  atorvastatin 80 milliGRAM(s) Oral at bedtime  furosemide    Tablet 40 milliGRAM(s) Oral daily  insulin glargine Injectable (LANTUS) 8 Unit(s) SubCutaneous at bedtime  insulin lispro (HumaLOG) corrective regimen sliding scale   SubCutaneous three times a day before meals  insulin lispro (HumaLOG) corrective regimen sliding scale   SubCutaneous at bedtime  insulin lispro Injectable (HumaLOG) 3 Unit(s) SubCutaneous before breakfast  insulin lispro Injectable (HumaLOG) 3 Unit(s) SubCutaneous before lunch  insulin lispro Injectable (HumaLOG) 2 Unit(s) SubCutaneous before dinner  levothyroxine 88 MICROGram(s) Oral daily  melatonin 3 milliGRAM(s) Oral at bedtime  metoprolol tartrate 50 milliGRAM(s) Oral every 8 hours  Nephro-macie 1 Tablet(s) Oral daily  pantoprazole    Tablet 40 milliGRAM(s) Oral before breakfast  senna 2 Tablet(s) Oral at bedtime    PRN Inpatient Medications      REVIEW OF SYSTEMS  --------------------------------------------------------------------------------  Gen: No fevers/chills  Skin: No rashes  Head/Eyes/Ears: Normal hearing,  Normal vision   Respiratory: +  dyspnea, cough  CV: No chest pain  GI: No abdominal pain, diarrhea, constipation, nausea, vomiting  : No dysuria, hematuria  MSK: +  edema  Heme: No easy bruising or bleeding  Psych: No significant depression    All other systems were reviewed and are negative, except as noted.    VITALS/PHYSICAL EXAM  --------------------------------------------------------------------------------  T(C): 36.3 (08-22-20 @ 06:36), Max: 37.1 (08-22-20 @ 00:01)  HR: 90 (08-22-20 @ 06:36) (54 - 93)  BP: 132/85 (08-22-20 @ 06:36) (132/85 - 157/70)  RR: 18 (08-22-20 @ 06:36) (15 - 20)  SpO2: 98% (08-22-20 @ 06:36) (97% - 100%)  Wt(kg): --  Height (cm): 177.8 (08-22-20 @ 06:36)  Weight (kg): 70 (08-22-20 @ 06:36)  BMI (kg/m2): 22.1 (08-22-20 @ 06:36)  BSA (m2): 1.87 (08-22-20 @ 06:36)      Physical Exam:  	Gen: NAD  	HEENT: MMM  	Pulm: decreased breath sounds  B/L  	CV: S1S2  	Abd: Soft, +BS   	Ext: + LE edema B/L  	Neuro: Awake, alert  	Skin: Warm and dry  	Vascular access: perma cath          : no  tucker  LABS/STUDIES  --------------------------------------------------------------------------------              8.4    8.72  >-----------<  342      [08-22-20 @ 02:25]              26.8     135  |  93  |  54  ----------------------------<  240      [08-22-20 @ 02:25]  4.7   |  25  |  5.29        Ca     9.3     [08-22-20 @ 02:25]      Mg     2.1     [08-22-20 @ 02:25]    TPro  7.3  /  Alb  3.9  /  TBili  0.5  /  DBili  x   /  AST  46  /  ALT  141  /  AlkPhos  418  [08-22-20 @ 02:25]          Creatinine Trend:  SCr 5.29 [08-22 @ 02:25]  SCr 7.42 [08-19 @ 04:54]  SCr 6.17 [08-18 @ 05:26]  SCr 8.56 [08-17 @ 05:59]  SCr 7.41 [08-16 @ 05:09]    Urinalysis - [01-02-20 @ 20:34]      Color Light Yellow / Appearance Slightly Turbid / SG 1.012 / pH 7.0      Gluc 100 mg/dL / Ketone Negative  / Bili Negative / Urobili Negative       Blood Small / Protein 300 mg/dL / Leuk Est Moderate / Nitrite Negative      RBC 1 / WBC 46 / Hyaline 1 / Gran  / Sq Epi  / Non Sq Epi 0 / Bacteria Many      Iron 45, TIBC 145, %sat 31      [12-26-19 @ 23:24]  Ferritin 1369      [12-26-19 @ 23:24]  PTH -- (Ca 8.4)      [02-21-20 @ 08:54]   93  HbA1c 6.0      [02-21-20 @ 08:53]  TSH 8.13      [08-16-20 @ 07:53]  Lipid: chol 124, TG 59, HDL 64, LDL 47      [08-08-20 @ 00:47]    HBsAb >1000.0      [12-14-19 @ 23:48]  HBsAb Reactive      [03-13-17 @ 19:00]  HBsAg Nonreact      [12-14-19 @ 23:48]  HBcAb Reactive      [06-03-19 @ 15:57]  HCV 0.24, Nonreact      [08-09-20 @ 19:37]  HIV Nonreact      [08-09-20 @ 19:26]

## 2020-08-22 NOTE — PROGRESS NOTE ADULT - SUBJECTIVE AND OBJECTIVE BOX
Subjective:  "Im better now, breathing is better"  Family visited    Tele:     Afib 90s                           T(C): 36.3 (08-22-20 @ 06:36), Max: 37.1 (08-22-20 @ 00:01)  HR: 90 (08-22-20 @ 06:36) (54 - 93)  BP: 132/85 (08-22-20 @ 06:36) (132/85 - 157/70)  RR: 18 (08-22-20 @ 06:36) (15 - 20)  SpO2: 98% (08-22-20 @ 06:36) (97% - 100%)        08-22    135  |  93<L>  |  54<H>  ----------------------------<  240<H>  4.7   |  25  |  5.29<H>    Ca    9.3      22 Aug 2020 02:25  Mg     2.1     08-22    TPro  7.3  /  Alb  3.9  /  TBili  0.5  /  DBili  x   /  AST  46<H>  /  ALT  141<H>  /  AlkPhos  418<H>  08-22                               8.4    8.72  )-----------( 342      ( 22 Aug 2020 02:25 )             26.8            CAPILLARY BLOOD GLUCOSE      POCT Blood Glucose.: 187 mg/dL (22 Aug 2020 11:53)           CXR: < from: Xray Chest 1 View AP/PA (08.22.20 @ 02:41) >  Left central venous catheter with tip in the SVC.  The lungs are clear.  Left pleural effusion. No pneumothorax.  Status post sternotomy. The heart size is not well evaluated on this projection.  The visualized osseous and soft tissue structures demonstrate no acute pathology.    < end of copied text >        Assessment  	General: NAD  	  	Neuro: A&Ox4, speech clear, no focal deficits noted    	Respiratory: diminished at L base, otherwise CTA b/l no wheezes, rales or rhonchi     	Cardiovascular: irregular, (+) S1/S2, no murmur appreciated, LIJ permacath w/ dressing in place - CDI    L permacath insitu    	Sternal wound: MSI healing well - CLARITZA, CDI    	GI: Abd soft, NT/ND, (+) BSx4Q (+) BM    	Peripheral Vascular:  2+ LE edema b/l, 2+ peripheral pulses b/l, warm and well perfused distally    	Musculoskeletal: B/L UE and LE 5/5 strength             MEDICATIONS  (STANDING):  apixaban 5 milliGRAM(s) Oral every 12 hours  aspirin enteric coated 81 milliGRAM(s) Oral daily  atorvastatin 80 milliGRAM(s) Oral at bedtime  chlorhexidine 2% Cloths 1 Application(s) Topical <User Schedule>  furosemide    Tablet 40 milliGRAM(s) Oral daily  insulin glargine Injectable (LANTUS) 8 Unit(s) SubCutaneous at bedtime  insulin lispro (HumaLOG) corrective regimen sliding scale   SubCutaneous three times a day before meals  insulin lispro (HumaLOG) corrective regimen sliding scale   SubCutaneous at bedtime  insulin lispro Injectable (HumaLOG) 3 Unit(s) SubCutaneous before breakfast  insulin lispro Injectable (HumaLOG) 3 Unit(s) SubCutaneous before lunch  insulin lispro Injectable (HumaLOG) 2 Unit(s) SubCutaneous before dinner  levothyroxine 88 MICROGram(s) Oral daily  melatonin 3 milliGRAM(s) Oral at bedtime  metoprolol tartrate 50 milliGRAM(s) Oral every 8 hours  Nephro-macie 1 Tablet(s) Oral daily  pantoprazole    Tablet 40 milliGRAM(s) Oral before breakfast  senna 2 Tablet(s) Oral at bedtime       PAST MEDICAL & SURGICAL HISTORY:  ESRD on dialysis: M/W/F  Intubation of airway performed without difficulty: Dec 2019  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

## 2020-08-22 NOTE — H&P ADULT - NSHPREVIEWOFSYSTEMS_GEN_ALL_CORE
REVIEW OF SYSTEMS:  CONSTITUTIONAL: Denies fever, weight loss or fatigue  EYES: Denies eye pain, visual disturbances, or discharge  ENMT: Denies difficulty hearing, tinnitus, vertigo, sinus or throat pain  NECK: Denies pain or stiffness  RESPIRATORY: (+) COLE, Denies cough, wheezing, chills or hemoptysis.  CARDIOVASCULAR: (+) b/l LE swelling, Denies chest pain, palpitations or dizziness.  GASTROINTESTINAL: Denies abdominal or epigastric pain, nausea, vomiting, hematemesis, diarrhea or melena  GENITOURINARY: (+) decreased urine output, Denies dysuria, frequency, hematuria, or incontinence  NEUROLOGICAL: Denies headaches, memory loss, loss of strength, numbness or tremors  SKIN: Denies itching, burning, rashes, or lesions   LYMPH NODES: Denies enlarged glands  ENDOCRINE: Denies heat or cold intolerance or hair loss  MUSCULOSKELETAL: Denies joint pain or swelling, muscle, back or extremity pain  PSYCHIATRIC: Denies depression, anxiety, mood swings or difficulty sleeping  HEME/LYMPH: Denies easy bruising or bleeding gums  ALLERGY: Denies hives or eczema

## 2020-08-22 NOTE — ED ADULT NURSE REASSESSMENT NOTE - NS ED NURSE REASSESS COMMENT FT1
Pt bladder scanned for retained urine. 65cc of urine retained in bladder at this time. MD Fang aware. Bed locked and lowered. Comfort and safety measures maintained.

## 2020-08-22 NOTE — ED ADULT TRIAGE NOTE - CHIEF COMPLAINT QUOTE
c/o shortness of breath; had dialysis today and had 2 L fluid removed earlier today  son gave patient 80 mg lasix at 1800; little urine output  hx of triple bypass surgery 8/13 c/o shortness of breath; had dialysis today and had 2 L fluid removed earlier today and son gave patient 80 mg lasix at 1800; little urine output.  hx of triple bypass surgery 8/13.

## 2020-08-22 NOTE — H&P ADULT - NSHPLABSRESULTS_GEN_ALL_CORE
8.4    8.72  )-----------( 342      ( 22 Aug 2020 02:25 )             26.8   08-22    135  |  93<L>  |  54<H>  ----------------------------<  240<H>  4.7   |  25  |  5.29<H>    Ca    9.3      22 Aug 2020 02:25  Mg     2.1     08-22    TPro  7.3  /  Alb  3.9  /  TBili  0.5  /  DBili  x   /  AST  46<H>  /  ALT  141<H>  /  AlkPhos  418<H>  08-22    Xray Chest 1 View AP/PA (08.22.20 @ 02:41): Left central venous catheter with tip in the SVC. The right lung is clear. Left pleural effusion.

## 2020-08-22 NOTE — H&P ADULT - PROBLEM SELECTOR PLAN 4
Continue Eliquis 5mg q12h  Continue Lopressor 50 mg q8h   Continue to monitor on tele   Check lytes daily and replete prn

## 2020-08-22 NOTE — PATIENT PROFILE ADULT - INTERPRETATION SERVICES DECLINED
Patient/Caregiver requests family/friend to interpret./No family at bedside. Able to make needs known in English

## 2020-08-22 NOTE — ED PROVIDER NOTE - CLINICAL SUMMARY MEDICAL DECISION MAKING FREE TEXT BOX
65 yo M w/ PMH HTN, CAD s/p LAD stent 2009, 2019, carotid stenosis, ESRD on HD (MWF Dr Cardenas, [place] [facility]- on transplant list), DM (on IV insulin, controlled, managed by Jamie Corral), CVA c/b status epilepticus requiring intubation and PEG in 12/2019-went to rehab, peg was removed July 2020, dysphagia s/p PEG, atrial fibrillation (on eliquis last dose 8/5/2020), anemia and GI bleed (Feb 2020) who presents today due to increased swelling, decreased urine output, increased LE edema and abdominal distention. Given constellation of symptoms, suggestive of CHF exacerbation vs sequale of bypass surgery. Will obtain US lungs to assess for potential pleural effusion, CXR, ecg, blood work. Will give pt 80mg IV lasix, contact CT surgery about potential admit.

## 2020-08-22 NOTE — PROCEDURE NOTE - NSPROCDETAILS_GEN_ALL_CORE
sterile dressing applied/Seldinger technique/thoracostomy tube placed percutaneously/ultrasound assessment of fluid (location)

## 2020-08-22 NOTE — ED ADULT NURSE REASSESSMENT NOTE - NS ED NURSE REASSESS COMMENT FT1
Pt remains in the ED. Resting comfortably in bed. A&Ox3. Breathing spontaneously and unlabored. NAD. Per War room, pt converted into Afib on cardiac monitor. Repeat VS charted and repeat EKG done. MD Durant and RN at bedside. On cardiac monitor, Afib. Pt Denies palpitations, CP. Pt safety maintained. Will continue to monitor. Awaiting dispo.

## 2020-08-22 NOTE — ED CLERICAL - NS ED CLERK NOTE PRE-ARRIVAL INFORMATION; ADDITIONAL PRE-ARRIVAL INFORMATION
This patient is enrolled in the Follow Your [facility] program and has undergone a cardiac surgery procedure within the last 30 days and has active care navigation.   This patient can be followed up by the care navigation team within 24 hours. To arrange close follow-up or to obtain additional clinical information about this patient, please call the contact number above.   Please call the cardiac surgery team once patient is registered at [phone] for consultation PRIOR to disposition decision.  The patient recently underwent a cardiac surgery procedure and the team can assist in acute medical management.

## 2020-08-22 NOTE — ED PROVIDER NOTE - PMH
Anemia    CAD (Coronary Artery Disease)  with Stents in 06/2009 and 6/2019, s/p off-pump C3L on 8/13/20  CRI (Chronic Renal Insufficiency)    CVA (cerebral vascular accident)  12/13/19 with residual bilateral weakness  Diabetes    Dyslipidemia    ESRD on dialysis  M/W/F  Hypertension    Hypothyroidism    Intubation of airway performed without difficulty  Dec 2019  CVA c/b status epilepticus requiring intubation and PEG in 12/2019-  PEG (percutaneous endoscopic gastrostomy) status  removed July 2020

## 2020-08-22 NOTE — ED ADULT NURSE NOTE - CHIEF COMPLAINT QUOTE
c/o shortness of breath; had dialysis today and had 2 L fluid removed earlier today and son gave patient 80 mg lasix at 1800; little urine output.  hx of triple bypass surgery 8/13.

## 2020-08-22 NOTE — H&P ADULT - HISTORY OF PRESENT ILLNESS
65 y/o M w/ PMHx of HTN, CAD (s/p LAD stent in 2009 & 2019, now s/p off-pump C3L w/ Dr. Guillory on 8/13/20), carotid stenosis, ESRD on HD (MWF Dr. Cardenas, [place] [facility]- on transplant list), DM (on insulin, managed by Jamie Corral), CVA c/b status epilepticus requiring intubation and PEG in 12/2019 (went to rehab, PEG was removed July 2020), dysphagia s/p PEG, atrial fibrillation (on Eliquis), anemia and GI bleed (Feb 2020) who presented to [facility] ED on 8/22 c/o worsening COLE and b/l LE swelling since he was discharged home on 8/19/20. Pt reports that he is able to walk 1/2 to 1 block before becoming SOB. Pt's son called the CTS office on 8/20 to report the symptoms, at which time Dr. Guillory  Pt was last dialyzed on 8/21 and had 2.3L removed, after which his symptoms persisted. 63 y/o M w/ PMHx of HTN, CAD (s/p LAD stent in 2009 & 2019, now s/p off-pump C3L w/ Dr. Guillory on 8/13/20, discharged maxime 8/19/20), carotid stenosis, ESRD on HD (MWF Dr. 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 Pt reports that he is able to walk 1/2 to 1 block before becoming SOB. Pt's son called the CTS office on 8/20 to report the symptoms, which were d/w Dr. Guillory, and pt was directed to resume Lasix 40 mg QD which he had been on prior to surgery.  Pt was last dialyzed on 8/21 and had 2.3L removed, after which his symptoms persisted. Pt's son reported giving pt 80 mg Lasix today, but that pt did not have an adequate response and had decreased urine output from his baseline today. Bladder scan in ED showed 65cc of urine in the bladder. CXR showed L pleural effusion that was present on CXR performed 8/16/20. Denies fever, chills, chest pain, palpitations, headache or dizziness.

## 2020-08-22 NOTE — ED PROVIDER NOTE - OBJECTIVE STATEMENT
History obtained from patient and patient's son who is a cardiologist.     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 Pt recently had a triple bypass surgery on 8/13, was discharged from hospital this past Wednesday (8/19). Son reports that his father has gained 20 lbs pre and post triple bypass surgery. After being discharged home on 8/19, pt noticed increased BL LE edema and abdominal distention. SOB was also endorsed. Went to dialysis today where 2.3L of fluid was removed. Pt went home with son, with persistent LE edema and low urine output for the past couple of days. Son gave pt 80 mg lasix at 1800. No chest pain, palpitations, diaphoresis, nausea or lightheadedness was reported. History obtained from patient and patient's son who is a cardiologist.     65 yo M w/ PMH HTN, CAD s/p LAD stent 2019, carotid stenosis, ESRD on HD (MWF Dr Cardenas, [place] [facility]- on transplant list), DM (on IV insulin, controlled, managed by Jamie Corral), CVA c/b status epilepticus requiring intubation and PEG in 2019-went to rehab, peg was removed 2020, dysphagia s/p PEG, atrial fibrillation (on eliquis last dose 2020), anemia and GI bleed (2020) who presents today due to increased swelling, increased LE edema and abdominal distention. Pt recently had a triple bypass surgery on , was discharged from hospital this past Wednesday (). Son reports that his father has gained 20 lbs pre and post triple bypass surgery. After being discharged home on , pt noticed increased BL LE edema and abdominal distention. SOB was also endorsed. Went to dialysis today where 2.3L of fluid was removed. Pt went home with son, with persistent LE edema and low urine output for the past couple of days. Son gave pt 80 mg lasix at 1800. No chest pain, palpitations, diaphoresis, nausea or lightheadedness was reported.    Attendinyo male presents with dyspnea, 20lb weight gain in past week, and LE edema.  shortness of breath worse with lying flat.

## 2020-08-22 NOTE — ED PROVIDER NOTE - PHYSICAL EXAMINATION
Gen: WDWN, NAD, calm and cooperative   HEENT: EOMI, no nasal discharge, mucous membranes moist  CV: RRR, +S1/S2, no M/R/G, capillary refill < 2 seconds   Resp: CTAB, no W/R/R  GI: Abdomen soft, mildly distended, NTTP, no masses  MSK: No open wounds, no bruising. 2+ BL LE edema  Neuro: A&Ox4, following commands, moving all four extremities spontaneously  Psych: appropriate mood

## 2020-08-22 NOTE — H&P ADULT - NSICDXPASTMEDICALHX_GEN_ALL_CORE_FT
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

## 2020-08-23 DIAGNOSIS — I49.8 OTHER SPECIFIED CARDIAC ARRHYTHMIAS: ICD-10-CM

## 2020-08-23 DIAGNOSIS — R74.0 NONSPECIFIC ELEVATION OF LEVELS OF TRANSAMINASE AND LACTIC ACID DEHYDROGENASE [LDH]: ICD-10-CM

## 2020-08-23 LAB
ALBUMIN SERPL ELPH-MCNC: 3.2 G/DL — LOW (ref 3.3–5)
ALP SERPL-CCNC: 393 U/L — HIGH (ref 40–120)
ALT FLD-CCNC: 95 U/L — HIGH (ref 10–45)
ANION GAP SERPL CALC-SCNC: 13 MMOL/L — SIGNIFICANT CHANGE UP (ref 5–17)
AST SERPL-CCNC: 32 U/L — SIGNIFICANT CHANGE UP (ref 10–40)
BILIRUB SERPL-MCNC: 0.4 MG/DL — SIGNIFICANT CHANGE UP (ref 0.2–1.2)
BUN SERPL-MCNC: 44 MG/DL — HIGH (ref 7–23)
CALCIUM SERPL-MCNC: 9.1 MG/DL — SIGNIFICANT CHANGE UP (ref 8.4–10.5)
CHLORIDE SERPL-SCNC: 93 MMOL/L — LOW (ref 96–108)
CO2 SERPL-SCNC: 28 MMOL/L — SIGNIFICANT CHANGE UP (ref 22–31)
CREAT SERPL-MCNC: 4.96 MG/DL — HIGH (ref 0.5–1.3)
GLUCOSE BLDC GLUCOMTR-MCNC: 179 MG/DL — HIGH (ref 70–99)
GLUCOSE BLDC GLUCOMTR-MCNC: 199 MG/DL — HIGH (ref 70–99)
GLUCOSE BLDC GLUCOMTR-MCNC: 203 MG/DL — HIGH (ref 70–99)
GLUCOSE BLDC GLUCOMTR-MCNC: 208 MG/DL — HIGH (ref 70–99)
GLUCOSE SERPL-MCNC: 159 MG/DL — HIGH (ref 70–99)
HCT VFR BLD CALC: 26.3 % — LOW (ref 39–50)
HGB BLD-MCNC: 8.1 G/DL — LOW (ref 13–17)
MAGNESIUM SERPL-MCNC: 2 MG/DL — SIGNIFICANT CHANGE UP (ref 1.6–2.6)
MCHC RBC-ENTMCNC: 28 PG — SIGNIFICANT CHANGE UP (ref 27–34)
MCHC RBC-ENTMCNC: 30.8 GM/DL — LOW (ref 32–36)
MCV RBC AUTO: 91 FL — SIGNIFICANT CHANGE UP (ref 80–100)
NRBC # BLD: 0 /100 WBCS — SIGNIFICANT CHANGE UP (ref 0–0)
PLATELET # BLD AUTO: 412 K/UL — HIGH (ref 150–400)
POTASSIUM SERPL-MCNC: 4.5 MMOL/L — SIGNIFICANT CHANGE UP (ref 3.5–5.3)
POTASSIUM SERPL-SCNC: 4.5 MMOL/L — SIGNIFICANT CHANGE UP (ref 3.5–5.3)
PROT SERPL-MCNC: 6.3 G/DL — SIGNIFICANT CHANGE UP (ref 6–8.3)
RBC # BLD: 2.89 M/UL — LOW (ref 4.2–5.8)
RBC # FLD: 18.5 % — HIGH (ref 10.3–14.5)
SODIUM SERPL-SCNC: 134 MMOL/L — LOW (ref 135–145)
WBC # BLD: 10.15 K/UL — SIGNIFICANT CHANGE UP (ref 3.8–10.5)
WBC # FLD AUTO: 10.15 K/UL — SIGNIFICANT CHANGE UP (ref 3.8–10.5)

## 2020-08-23 PROCEDURE — 71045 X-RAY EXAM CHEST 1 VIEW: CPT | Mod: 26

## 2020-08-23 RX ADMIN — OXYCODONE HYDROCHLORIDE 5 MILLIGRAM(S): 5 TABLET ORAL at 11:37

## 2020-08-23 RX ADMIN — POLYETHYLENE GLYCOL 3350 17 GRAM(S): 17 POWDER, FOR SOLUTION ORAL at 09:03

## 2020-08-23 RX ADMIN — PANTOPRAZOLE SODIUM 40 MILLIGRAM(S): 20 TABLET, DELAYED RELEASE ORAL at 05:45

## 2020-08-23 RX ADMIN — OXYCODONE HYDROCHLORIDE 5 MILLIGRAM(S): 5 TABLET ORAL at 03:35

## 2020-08-23 RX ADMIN — Medication 1: at 16:58

## 2020-08-23 RX ADMIN — Medication 3 UNIT(S): at 12:36

## 2020-08-23 RX ADMIN — OXYCODONE HYDROCHLORIDE 5 MILLIGRAM(S): 5 TABLET ORAL at 16:47

## 2020-08-23 RX ADMIN — CHLORHEXIDINE GLUCONATE 1 APPLICATION(S): 213 SOLUTION TOPICAL at 08:08

## 2020-08-23 RX ADMIN — INSULIN GLARGINE 8 UNIT(S): 100 INJECTION, SOLUTION SUBCUTANEOUS at 21:56

## 2020-08-23 RX ADMIN — OXYCODONE HYDROCHLORIDE 5 MILLIGRAM(S): 5 TABLET ORAL at 12:07

## 2020-08-23 RX ADMIN — Medication 3 MILLIGRAM(S): at 21:57

## 2020-08-23 RX ADMIN — OXYCODONE HYDROCHLORIDE 5 MILLIGRAM(S): 5 TABLET ORAL at 17:17

## 2020-08-23 RX ADMIN — Medication 3 UNIT(S): at 08:46

## 2020-08-23 RX ADMIN — OXYCODONE HYDROCHLORIDE 5 MILLIGRAM(S): 5 TABLET ORAL at 06:36

## 2020-08-23 RX ADMIN — Medication 40 MILLIGRAM(S): at 05:45

## 2020-08-23 RX ADMIN — ATORVASTATIN CALCIUM 80 MILLIGRAM(S): 80 TABLET, FILM COATED ORAL at 21:56

## 2020-08-23 RX ADMIN — Medication 2 UNIT(S): at 16:59

## 2020-08-23 RX ADMIN — Medication 50 MILLIGRAM(S): at 05:45

## 2020-08-23 RX ADMIN — Medication 1 TABLET(S): at 09:03

## 2020-08-23 RX ADMIN — Medication 81 MILLIGRAM(S): at 09:03

## 2020-08-23 RX ADMIN — Medication 2: at 12:36

## 2020-08-23 RX ADMIN — SENNA PLUS 2 TABLET(S): 8.6 TABLET ORAL at 21:57

## 2020-08-23 RX ADMIN — Medication 88 MICROGRAM(S): at 05:45

## 2020-08-23 RX ADMIN — OXYCODONE HYDROCHLORIDE 5 MILLIGRAM(S): 5 TABLET ORAL at 02:50

## 2020-08-23 RX ADMIN — OXYCODONE HYDROCHLORIDE 5 MILLIGRAM(S): 5 TABLET ORAL at 05:51

## 2020-08-23 RX ADMIN — Medication 1: at 08:46

## 2020-08-23 NOTE — PROGRESS NOTE ADULT - PROBLEM SELECTOR PLAN 1
Asa, Statin, B-blocker,   Chest PT,  Incentive spirometry, wound care and assessment.  Ambulate Asa, Statin,    Chest PT,  Incentive spirometry, wound care and assessment.  Ambulate

## 2020-08-23 NOTE — PROGRESS NOTE ADULT - SUBJECTIVE AND OBJECTIVE BOX
Subjective:  "Hi, my son will be coming later today"  OOB chair, gait steady    Tele:         Afib 70-90                       T(C): 36.6 (08-23-20 @ 14:15), Max: 36.7 (08-22-20 @ 18:40)  HR: 59 (08-23-20 @ 14:15) (53 - 102)  BP: 122/67 (08-23-20 @ 14:15) (101/65 - 164/86)  RR: 18 (08-23-20 @ 14:15) (18 - 18)  SpO2: 98% (08-23-20 @ 14:15) (93% - 100%)     LVEF:       08-23    134<L>  |  93<L>  |  44<H>  ----------------------------<  159<H>  4.5   |  28  |  4.96<H>    Ca    9.1      23 Aug 2020 06:08  Mg     2.0     08-23    TPro  6.3  /  Alb  3.2<L>  /  TBili  0.4  /  DBili  x   /  AST  32  /  ALT  95<H>  /  AlkPhos  393<H>  08-23                               8.1    10.15 )-----------( 412      ( 23 Aug 2020 06:08 )             26.3            CAPILLARY BLOOD GLUCOSE      POCT Blood Glucose.: 208 mg/dL (23 Aug 2020 11:52)  POCT Blood Glucose.: 179 mg/dL (23 Aug 2020 07:53)  POCT Blood Glucose.: 121 mg/dL (22 Aug 2020 21:23)  POCT Blood Glucose.: 115 mg/dL (22 Aug 2020 17:10)               Assessment    	  	Neuro: A&Ox4, speech clear, no focal deficits noted    	Respiratory: diminished at L base, otherwise CTA b/l no wheezes, rales or rhonchi     	Cardiovascular: irregular, (+) S1/S2, no murmur appreciated, LIJ permacath w/ dressing in place - CDI    L permacath insitu    	Sternal wound: MSI healing well - CLARITZA, CDI    	GI: Abd soft, NT/ND, (+) BSx4Q     Ext :  2+ LE edema b/l, 2+ peripheral pulses b/l, warm and well perfused distally    	        MEDICATIONS  (STANDING):  aspirin enteric coated 81 milliGRAM(s) Oral daily  atorvastatin 80 milliGRAM(s) Oral at bedtime  chlorhexidine 2% Cloths 1 Application(s) Topical <User Schedule>  furosemide    Tablet 40 milliGRAM(s) Oral daily  insulin glargine Injectable (LANTUS) 8 Unit(s) SubCutaneous at bedtime  insulin lispro (HumaLOG) corrective regimen sliding scale   SubCutaneous three times a day before meals  insulin lispro (HumaLOG) corrective regimen sliding scale   SubCutaneous at bedtime  insulin lispro Injectable (HumaLOG) 3 Unit(s) SubCutaneous before breakfast  insulin lispro Injectable (HumaLOG) 3 Unit(s) SubCutaneous before lunch  insulin lispro Injectable (HumaLOG) 2 Unit(s) SubCutaneous before dinner  levothyroxine 88 MICROGram(s) Oral daily  melatonin 3 milliGRAM(s) Oral at bedtime  Nephro-macie 1 Tablet(s) Oral daily  pantoprazole    Tablet 40 milliGRAM(s) Oral before breakfast  polyethylene glycol 3350 17 Gram(s) Oral daily  senna 2 Tablet(s) Oral at bedtime       PAST MEDICAL & SURGICAL HISTORY:  ESRD on dialysis: M/W/F  Intubation of airway performed without difficulty: Dec 2019  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

## 2020-08-23 NOTE — PROGRESS NOTE ADULT - ASSESSMENT
65 y/o M w/ PMHx of HTN, CAD (s/p LAD stent in 2009 & 2019, now s/p off-pump C3L w/ Dr. Guillory on 8/13/20, discharged maxime 8/19/20), carotid stenosis, ESRD on HD (MWF Dr. Cardenas, [place] [facility]- on transplant list), DM (on insulin, managed by Jamie Corral), CVA c/b status epilepticus requiring intubation and PEG in 12/2019 (went to rehab, PEG was removed July 2020), dysphagia s/p PEG, atrial fibrillation (on Eliquis), anemia and GI bleed (Feb 2020) who presented to [facility] ED on 8/22 c/o worsening COLE and b/l LE swelling since he was discharged home on 8/19/20.     ESRD on HD ( MWF)  PT from [facility]  s/p cath 8/7  s/p CABG  8/13  s/p perma cath on 8/18  s/p H Don 8/22 with 2.5 L UF  Discussed with CTS-- plan for HD tomororw  Per son, would like trial of torsemide 40mg QD , and  later metolazone added fro volume control  Plan for Daily HD   Consent obtained from son at [phone]  MOnitor BMP      Anemia  Hb below goal   IGOR with HD  Monitor at present    HTN  Bp stable  Monitor BP  Low salt diet    CKD -BMD  Check PTH  Monitor serum calcium and PO4

## 2020-08-23 NOTE — PROGRESS NOTE ADULT - SUBJECTIVE AND OBJECTIVE BOX
[facility]   MD NINA MASON MD RUORU WONG, PA    TEL:  OFFICE: [phone]  DR HSU CELL: [phone]  RAS MORELOS CELL: [phone]  DR. MARQUEZ CELL: [phone]  DR. ALEX CELL: [phone]    FROM 5 PM - 7 AM PLEASE CALL ANSWERING SERVICE: [phone]    RENAL FOLLOW UP NOTE  --------------------------------------------------------------------------------  HPI:      Pt seen and examined at bedside.       PAST HISTORY  --------------------------------------------------------------------------------  No significant changes to PMH, PSH, FHx, SHx, unless otherwise noted    ALLERGIES & MEDICATIONS  --------------------------------------------------------------------------------  Allergies    No Known Allergies    Intolerances      Standing Inpatient Medications  apixaban 5 milliGRAM(s) Oral every 12 hours  aspirin enteric coated 81 milliGRAM(s) Oral daily  atorvastatin 80 milliGRAM(s) Oral at bedtime  chlorhexidine 2% Cloths 1 Application(s) Topical <User Schedule>  furosemide    Tablet 40 milliGRAM(s) Oral daily  insulin glargine Injectable (LANTUS) 8 Unit(s) SubCutaneous at bedtime  insulin lispro (HumaLOG) corrective regimen sliding scale   SubCutaneous three times a day before meals  insulin lispro (HumaLOG) corrective regimen sliding scale   SubCutaneous at bedtime  insulin lispro Injectable (HumaLOG) 3 Unit(s) SubCutaneous before breakfast  insulin lispro Injectable (HumaLOG) 3 Unit(s) SubCutaneous before lunch  insulin lispro Injectable (HumaLOG) 2 Unit(s) SubCutaneous before dinner  levothyroxine 88 MICROGram(s) Oral daily  melatonin 3 milliGRAM(s) Oral at bedtime  metoprolol tartrate 50 milliGRAM(s) Oral every 8 hours  Nephro-macie 1 Tablet(s) Oral daily  pantoprazole    Tablet 40 milliGRAM(s) Oral before breakfast  polyethylene glycol 3350 17 Gram(s) Oral daily  senna 2 Tablet(s) Oral at bedtime    PRN Inpatient Medications  oxyCODONE    IR 5 milliGRAM(s) Oral every 4 hours PRN      REVIEW OF SYSTEMS  --------------------------------------------------------------------------------  General: no fever  CVS: no chest pain  RESP: intermittent  sob, no cough  ABD: no abdominal pain  : no dysuria,  MSK: + edema     VITALS/PHYSICAL EXAM  --------------------------------------------------------------------------------  T(C): 36.7 (08-23-20 @ 04:35), Max: 36.7 (08-22-20 @ 18:40)  HR: 85 (08-23-20 @ 04:35) (85 - 102)  BP: 101/65 (08-23-20 @ 04:35) (101/65 - 164/86)  RR: 18 (08-23-20 @ 04:35) (18 - 18)  SpO2: 98% (08-23-20 @ 04:35) (93% - 98%)  Wt(kg): --  Height (cm): 177.8 (08-22-20 @ 06:36)  Weight (kg): 70 (08-22-20 @ 06:36)  BMI (kg/m2): 22.1 (08-22-20 @ 06:36)  BSA (m2): 1.87 (08-22-20 @ 06:36)      08-22-20 @ 07:01  -  08-23-20 @ 07:00  --------------------------------------------------------  IN: 360 mL / OUT: 3720 mL / NET: -3360 mL      Physical Exam:  	Gen: NAD  	HEENT: MMM  	Pulm: crackles  B/L, + chest tube  	CV: S1S2  	Abd: Soft, +BS  	Ext: + LE edema B/L                      Neuro: Awake alert  	Skin: Warm and Dry   	Vascular access: perma cath           FABIÁN tucker  LABS/STUDIES  --------------------------------------------------------------------------------              8.1    10.15 >-----------<  412      [08-23-20 @ 06:08]              26.3     134  |  93  |  44  ----------------------------<  159      [08-23-20 @ 06:08]  4.5   |  28  |  4.96        Ca     9.1     [08-23-20 @ 06:08]      Mg     2.0     [08-23-20 @ 06:08]    TPro  6.3  /  Alb  3.2  /  TBili  0.4  /  DBili  x   /  AST  32  /  ALT  95  /  AlkPhos  393  [08-23-20 @ 06:08]          Creatinine Trend:  SCr 4.96 [08-23 @ 06:08]  SCr 5.29 [08-22 @ 02:25]  SCr 7.42 [08-19 @ 04:54]  SCr 6.17 [08-18 @ 05:26]  SCr 8.56 [08-17 @ 05:59]        Iron 45, TIBC 145, %sat 31      [12-26-19 @ 23:24]  Ferritin 1369      [12-26-19 @ 23:24]  PTH -- (Ca 8.4)      [02-21-20 @ 08:54]   93  HbA1c 6.0      [02-21-20 @ 08:53]  TSH 8.13      [08-16-20 @ 07:53]  Lipid: chol 124, TG 59, HDL 64, LDL 47      [08-08-20 @ 00:47]    HCV 0.24, Nonreact      [08-09-20 @ 19:37]  HIV Nonreact      [08-09-20 @ 19:26]

## 2020-08-23 NOTE — PROGRESS NOTE ADULT - ASSESSMENT
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 Patient reports cardiac evaluation for renal transplant- s/p cath-TVD-plan for CABG 8/13/20 with Dr. Guillory.     Son [phone] (07 Aug 2020 06:50)    8/12 VSS; AFB x3 Negative - HD today, plan to remove permacath by IR after HD. Plan for OR with Dr. Guillory for CABG tomorrow 8/13/20.     8/13/20 Off-Pump CABG x 3: LIMA-LAD, LSVG-OM1, LSVG-OM2  Urgent post op dialysis for hyperkalemia, permacath d/c preop, temp catheter placed  He was on a dysphagia diet at home preop, s&s consult pending.  Afib preop, on eliquis, resumed, beta blockers started.  Transferred to sdu.  8/15 The patient has  a central line that will need to be removed, hold eliquis.  Will need a permacath , ? monday, will need to contact IR Monday, consult placed in sunrise)  H/o dysphagia, placed on dys 3 diet thickened liquids , speech and swallow following  + strongyloide antibody as per ID, 2 days of iverrectin( potential renal transplant candidate)  8/16 VSS - glucose 144 this am - rounds made w/ Dr. Whitney this am - can isolate Pw's & transfer to floor  8/17 VSS - NPO for permacath - call from IR Dr. Barron.  d/t increased BUn -100 - will place Shiley today & transfer over to Permacath later in the week.  pt is NPO   8/18 VSS- permacath placed in IR this am.  Bleeding from site on arrival to floor . IR PA notified - pressure applied - will monitor - may keep pt. one more day to monitor permacath site.   8/22 read fluid overload, suspected pl effusion  L pigtail>700  ml serosang  Extra HD today    Abd  U/S  elevated LFTs  8/23 Eliquis D/C> Dr Hall...Will D/C pigtail 8/24  Resumed preadmission diuretics   LFTs improved 63 yo M w/ PMH HTN, CAD s/p LAD stent 2009, 2019, carotid stenosis, ESRD on HD (MWF Dr Cardenas, [place] [facility]- on transplant list), DM (on IV insulin, controlled, managed by Jamie Corral), CVA c/b status epilepticus requiring intubation and PEG in 12/2019-went to rehab, peg was removed July 2020, dysphagia s/p PEG, atrial fibrillation (on eliquis last dose 8/5/2020), anemia and GI bleed (Feb 2020) who presents today for cardiac angiogram. Patient reports cardiac evaluation for renal transplant- s/p cath-TVD-plan for CABG 8/13/20 with Dr. Guillory.     Son [phone] (07 Aug 2020 06:50)    8/12 VSS; AFB x3 Negative - HD today, plan to remove permacath by IR after HD. Plan for OR with Dr. Guillory for CABG tomorrow 8/13/20.     8/13/20 Off-Pump CABG x 3: LIMA-LAD, LSVG-OM1, LSVG-OM2  Urgent post op dialysis for hyperkalemia, permacath d/c preop, temp catheter placed  He was on a dysphagia diet at home preop, s&s consult pending.  Afib preop, on eliquis, resumed, beta blockers started.  Transferred to sdu.  8/15 The patient has  a central line that will need to be removed, hold eliquis.  Will need a permacath , ? monday, will need to contact IR Monday, consult placed in sunrise)  H/o dysphagia, placed on dys 3 diet thickened liquids , speech and swallow following  + strongyloide antibody as per ID, 2 days of iverrectin( potential renal transplant candidate)  8/16 VSS - glucose 144 this am - rounds made w/ Dr. Whitney this am - can isolate Pw's & transfer to floor  8/17 VSS - NPO for permacath - call from IR Dr. Barron.  d/t increased BUn -100 - will place Shiley today & transfer over to Permacath later in the week.  pt is NPO   8/18 VSS- permacath placed in IR this am.  Bleeding from site on arrival to floor . IR PA notified - pressure applied - will monitor - may keep pt. one more day to monitor permacath site.   8/22 read fluid overload, suspected pl effusion  L pigtail>700  ml serosang  Extra HD today    Abd  U/S  elevated LFTs  8/23 Eliquis D/C> Dr Hall...Will D/C pigtail 8/24  Resumed preadmission diuretics   LFTs improved  6 SEC CONVERSION PAUSE> BETABLOCKER d/c

## 2020-08-24 ENCOUNTER — APPOINTMENT (OUTPATIENT)
Dept: CARE COORDINATION | Facility: HOME HEALTH | Age: 65
End: 2020-08-24

## 2020-08-24 LAB
ALBUMIN SERPL ELPH-MCNC: 3.5 G/DL — SIGNIFICANT CHANGE UP (ref 3.3–5)
ALP SERPL-CCNC: 337 U/L — HIGH (ref 40–120)
ALT FLD-CCNC: 74 U/L — HIGH (ref 10–45)
ANION GAP SERPL CALC-SCNC: 16 MMOL/L — SIGNIFICANT CHANGE UP (ref 5–17)
AST SERPL-CCNC: 26 U/L — SIGNIFICANT CHANGE UP (ref 10–40)
BILIRUB DIRECT SERPL-MCNC: 0.2 MG/DL — SIGNIFICANT CHANGE UP (ref 0–0.2)
BILIRUB INDIRECT FLD-MCNC: 0.2 MG/DL — SIGNIFICANT CHANGE UP (ref 0.2–1)
BILIRUB SERPL-MCNC: 0.4 MG/DL — SIGNIFICANT CHANGE UP (ref 0.2–1.2)
BUN SERPL-MCNC: 66 MG/DL — HIGH (ref 7–23)
CALCIUM SERPL-MCNC: 9.2 MG/DL — SIGNIFICANT CHANGE UP (ref 8.4–10.5)
CHLORIDE SERPL-SCNC: 90 MMOL/L — LOW (ref 96–108)
CO2 SERPL-SCNC: 25 MMOL/L — SIGNIFICANT CHANGE UP (ref 22–31)
CREAT SERPL-MCNC: 6.59 MG/DL — HIGH (ref 0.5–1.3)
GLUCOSE BLDC GLUCOMTR-MCNC: 119 MG/DL — HIGH (ref 70–99)
GLUCOSE BLDC GLUCOMTR-MCNC: 163 MG/DL — HIGH (ref 70–99)
GLUCOSE BLDC GLUCOMTR-MCNC: 172 MG/DL — HIGH (ref 70–99)
GLUCOSE BLDC GLUCOMTR-MCNC: 198 MG/DL — HIGH (ref 70–99)
GLUCOSE BLDC GLUCOMTR-MCNC: 284 MG/DL — HIGH (ref 70–99)
GLUCOSE SERPL-MCNC: 162 MG/DL — HIGH (ref 70–99)
HCT VFR BLD CALC: 25.2 % — LOW (ref 39–50)
HGB BLD-MCNC: 8 G/DL — LOW (ref 13–17)
MAGNESIUM SERPL-MCNC: 2.1 MG/DL — SIGNIFICANT CHANGE UP (ref 1.6–2.6)
MCHC RBC-ENTMCNC: 28.6 PG — SIGNIFICANT CHANGE UP (ref 27–34)
MCHC RBC-ENTMCNC: 31.7 GM/DL — LOW (ref 32–36)
MCV RBC AUTO: 90 FL — SIGNIFICANT CHANGE UP (ref 80–100)
NRBC # BLD: 0 /100 WBCS — SIGNIFICANT CHANGE UP (ref 0–0)
PLATELET # BLD AUTO: 405 K/UL — HIGH (ref 150–400)
POTASSIUM SERPL-MCNC: 5.2 MMOL/L — SIGNIFICANT CHANGE UP (ref 3.5–5.3)
POTASSIUM SERPL-MCNC: 5.5 MMOL/L — HIGH (ref 3.5–5.3)
POTASSIUM SERPL-SCNC: 5.2 MMOL/L — SIGNIFICANT CHANGE UP (ref 3.5–5.3)
POTASSIUM SERPL-SCNC: 5.5 MMOL/L — HIGH (ref 3.5–5.3)
PROT SERPL-MCNC: 6.4 G/DL — SIGNIFICANT CHANGE UP (ref 6–8.3)
RBC # BLD: 2.8 M/UL — LOW (ref 4.2–5.8)
RBC # FLD: 18.2 % — HIGH (ref 10.3–14.5)
SODIUM SERPL-SCNC: 131 MMOL/L — LOW (ref 135–145)
WBC # BLD: 11.41 K/UL — HIGH (ref 3.8–10.5)
WBC # FLD AUTO: 11.41 K/UL — HIGH (ref 3.8–10.5)

## 2020-08-24 PROCEDURE — 76700 US EXAM ABDOM COMPLETE: CPT | Mod: 26

## 2020-08-24 PROCEDURE — 99024 POSTOP FOLLOW-UP VISIT: CPT

## 2020-08-24 RX ORDER — SORBITOL SOLUTION 70 %
30 SOLUTION, ORAL MISCELLANEOUS ONCE
Refills: 0 | Status: COMPLETED | OUTPATIENT
Start: 2020-08-24 | End: 2020-08-24

## 2020-08-24 RX ADMIN — SENNA PLUS 2 TABLET(S): 8.6 TABLET ORAL at 21:58

## 2020-08-24 RX ADMIN — OXYCODONE HYDROCHLORIDE 5 MILLIGRAM(S): 5 TABLET ORAL at 04:41

## 2020-08-24 RX ADMIN — Medication 2 UNIT(S): at 17:31

## 2020-08-24 RX ADMIN — Medication 40 MILLIGRAM(S): at 05:14

## 2020-08-24 RX ADMIN — Medication 1: at 07:54

## 2020-08-24 RX ADMIN — Medication 81 MILLIGRAM(S): at 14:14

## 2020-08-24 RX ADMIN — Medication 88 MICROGRAM(S): at 05:14

## 2020-08-24 RX ADMIN — CHLORHEXIDINE GLUCONATE 1 APPLICATION(S): 213 SOLUTION TOPICAL at 12:42

## 2020-08-24 RX ADMIN — OXYCODONE HYDROCHLORIDE 5 MILLIGRAM(S): 5 TABLET ORAL at 18:57

## 2020-08-24 RX ADMIN — ATORVASTATIN CALCIUM 80 MILLIGRAM(S): 80 TABLET, FILM COATED ORAL at 21:58

## 2020-08-24 RX ADMIN — OXYCODONE HYDROCHLORIDE 5 MILLIGRAM(S): 5 TABLET ORAL at 04:11

## 2020-08-24 RX ADMIN — Medication 3 MILLIGRAM(S): at 21:58

## 2020-08-24 RX ADMIN — INSULIN GLARGINE 8 UNIT(S): 100 INJECTION, SOLUTION SUBCUTANEOUS at 22:06

## 2020-08-24 RX ADMIN — PANTOPRAZOLE SODIUM 40 MILLIGRAM(S): 20 TABLET, DELAYED RELEASE ORAL at 05:14

## 2020-08-24 RX ADMIN — Medication 40 MILLIGRAM(S): at 14:14

## 2020-08-24 RX ADMIN — Medication 3: at 17:30

## 2020-08-24 RX ADMIN — OXYCODONE HYDROCHLORIDE 5 MILLIGRAM(S): 5 TABLET ORAL at 19:00

## 2020-08-24 RX ADMIN — Medication 1 TABLET(S): at 14:14

## 2020-08-24 RX ADMIN — Medication 30 MILLILITER(S): at 14:14

## 2020-08-24 NOTE — PROGRESS NOTE ADULT - SUBJECTIVE AND OBJECTIVE BOX
[facility] [place] [facility]   MD NINA MASON MD RUORU WONG, PA    TEL:  OFFICE: [phone]  DR HSU CELL: [phone]  RAS MORELOS CELL: [phone]  DR. MARQUEZ CELL: [phone]  DR. ALEX CELL: [phone]    FROM 5 PM - 7 AM PLEASE CALL ANSWERING SERVICE: [phone]    RENAL FOLLOW UP NOTE  --------------------------------------------------------------------------------  HPI:      Pt seen and examined at bedside.   Denies SOB, chest pain     PAST HISTORY  --------------------------------------------------------------------------------  No significant changes to PMH, PSH, FHx, SHx, unless otherwise noted    ALLERGIES & MEDICATIONS  --------------------------------------------------------------------------------  Allergies    No Known Allergies    Intolerances      Standing Inpatient Medications  aspirin enteric coated 81 milliGRAM(s) Oral daily  atorvastatin 80 milliGRAM(s) Oral at bedtime  chlorhexidine 2% Cloths 1 Application(s) Topical <User Schedule>  insulin glargine Injectable (LANTUS) 8 Unit(s) SubCutaneous at bedtime  insulin lispro (HumaLOG) corrective regimen sliding scale   SubCutaneous three times a day before meals  insulin lispro (HumaLOG) corrective regimen sliding scale   SubCutaneous at bedtime  insulin lispro Injectable (HumaLOG) 3 Unit(s) SubCutaneous before breakfast  insulin lispro Injectable (HumaLOG) 3 Unit(s) SubCutaneous before lunch  insulin lispro Injectable (HumaLOG) 2 Unit(s) SubCutaneous before dinner  levothyroxine 88 MICROGram(s) Oral daily  melatonin 3 milliGRAM(s) Oral at bedtime  Nephro-macie 1 Tablet(s) Oral daily  pantoprazole    Tablet 40 milliGRAM(s) Oral before breakfast  polyethylene glycol 3350 17 Gram(s) Oral daily  senna 2 Tablet(s) Oral at bedtime  torsemide 40 milliGRAM(s) Oral daily    PRN Inpatient Medications  oxyCODONE    IR 5 milliGRAM(s) Oral every 4 hours PRN      REVIEW OF SYSTEMS  --------------------------------------------------------------------------------  General: no fever  CVS: no chest pain  RESP: no sob, no cough  MSK: + edema     VITALS/PHYSICAL EXAM  --------------------------------------------------------------------------------  T(C): 36.8 (08-24-20 @ 12:00), Max: 37.2 (08-23-20 @ 20:25)  HR: 55 (08-24-20 @ 12:00) (55 - 60)  BP: 121/71 (08-24-20 @ 12:00) (121/71 - 143/78)  RR: 18 (08-24-20 @ 12:00) (18 - 18)  SpO2: 96% (08-24-20 @ 12:00) (96% - 98%)  Wt(kg): --        08-23-20 @ 07:01  -  08-24-20 @ 07:00  --------------------------------------------------------  IN: 780 mL / OUT: 625 mL / NET: 155 mL    08-24-20 @ 07:01  -  08-24-20 @ 12:26  --------------------------------------------------------  IN: 0 mL / OUT: 2800 mL / NET: -2800 mL      Physical Exam:  	Gen: NAD  	HEENT: MMM  	Pulm: CTA B/L  	CV: S1S2  	Abd: Soft, +BS  	Ext: No LE edema B/L                      Neuro: Awake   	Skin: Warm and Dry   	Vascular access: perma cath          Ro tucker  LABS/STUDIES  --------------------------------------------------------------------------------              8.0    11.41 >-----------<  405      [08-24-20 @ 06:28]              25.2     131  |  90  |  66  ----------------------------<  162      [08-24-20 @ 06:28]  5.5   |  25  |  6.59        Ca     9.2     [08-24-20 @ 06:28]      Mg     2.1     [08-24-20 @ 06:28]    TPro  6.4  /  Alb  3.5  /  TBili  0.4  /  DBili  0.2  /  AST  26  /  ALT  74  /  AlkPhos  337  [08-24-20 @ 06:28]          Creatinine Trend:  SCr 6.59 [08-24 @ 06:28]  SCr 4.96 [08-23 @ 06:08]  SCr 5.29 [08-22 @ 02:25]  SCr 7.42 [08-19 @ 04:54]  SCr 6.17 [08-18 @ 05:26]        Iron 45, TIBC 145, %sat 31      [12-26-19 @ 23:24]  Ferritin 1369      [12-26-19 @ 23:24]  PTH -- (Ca 8.4)      [02-21-20 @ 08:54]   93  HbA1c 6.0      [02-21-20 @ 08:53]  TSH 8.13      [08-16-20 @ 07:53]  Lipid: chol 124, TG 59, HDL 64, LDL 47      [08-08-20 @ 00:47]    HCV 0.24, Nonreact      [08-09-20 @ 19:37]  HIV Nonreact      [08-09-20 @ 19:26]

## 2020-08-24 NOTE — PROGRESS NOTE ADULT - ASSESSMENT
65 yo M w/ PMH HTN, CAD s/p LAD stent 2009, 2019, carotid stenosis, ESRD on HD (MWF Dr Cardenas, [place] [facility]- on transplant list), DM (on IV insulin, controlled, managed by Jamie Corral), CVA c/b status epilepticus requiring intubation and PEG in 12/2019-went to rehab, peg was removed July 2020, dysphagia s/p PEG, atrial fibrillation (on eliquis last dose 8/5/2020), anemia and GI bleed (Feb 2020) who presents today for cardiac angiogram. Patient reports cardiac evaluation for renal transplant- s/p cath-TVD-plan for CABG 8/13/20 with Dr. Guillory.     Son [phone] (07 Aug 2020 06:50)    8/12 VSS; AFB x3 Negative - HD today, plan to remove permacath by IR after HD. Plan for OR with Dr. Guillory for CABG tomorrow 8/13/20.     8/13/20 Off-Pump CABG x 3: LIMA-LAD, LSVG-OM1, LSVG-OM2  Urgent post op dialysis for hyperkalemia, permacath d/c preop, temp catheter placed  He was on a dysphagia diet at home preop, s&s consult pending.  Afib preop, on eliquis, resumed, beta blockers started.  Transferred to sdu.  8/15 The patient has  a central line that will need to be removed, hold eliquis.  Will need a permacath , ? monday, will need to contact IR Monday, consult placed in sunrise)  H/o dysphagia, placed on dys 3 diet thickened liquids , speech and swallow following  + strongyloide antibody as per ID, 2 days of iverrectin( potential renal transplant candidate)  8/16 VSS - glucose 144 this am - rounds made w/ Dr. Whitney this am - can isolate Pw's & transfer to floor  8/17 VSS - NPO for permacath - call from IR Dr. Barron.  d/t increased BUn -100 - will place Shiley today & transfer over to Permacath later in the week.  pt is NPO   8/18 VSS- permacath placed in IR this am.  Bleeding from site on arrival to floor . IR PA notified - pressure applied - will monitor - may keep pt. one more day to monitor permacath site.   8/22 read fluid overload, suspected pl effusion  L pigtail>700  ml serosang  Extra HD today    Abd  U/S  elevated LFTs  8/23 Eliquis D/C> Dr Hall...Will D/C pigtail 8/24  Resumed preadmission diuretics   LFTs improved  6 SEC CONVERSION PAUSE> BETABLOCKER d/c  8/24   HD   Lt pigtail draining    300 cc  24 hrs w/ eliquis held for pull   repeat 65 yo M w/ PMH HTN, CAD s/p LAD stent 2009, 2019, carotid stenosis, ESRD on HD (MWF Dr Cardenas, [place] [facility]- on transplant list), DM (on IV insulin, controlled, managed by Jamie Corral), CVA c/b status epilepticus requiring intubation and PEG in 12/2019-went to rehab, peg was removed July 2020, dysphagia s/p PEG, atrial fibrillation (on eliquis last dose 8/5/2020), anemia and GI bleed (Feb 2020) who presents today for cardiac angiogram. Patient reports cardiac evaluation for renal transplant- s/p cath-TVD-plan for CABG 8/13/20 with Dr. Guillory.     Son [phone] (07 Aug 2020 06:50)    8/12 VSS; AFB x3 Negative - HD today, plan to remove permacath by IR after HD. Plan for OR with Dr. Guillory for CABG tomorrow 8/13/20.     8/13/20 Off-Pump CABG x 3: LIMA-LAD, LSVG-OM1, LSVG-OM2  Urgent post op dialysis for hyperkalemia, permacath d/c preop, temp catheter placed  He was on a dysphagia diet at home preop, s&s consult pending.  Afib preop, on eliquis, resumed, beta blockers started.  Transferred to sdu.  8/15 The patient has  a central line that will need to be removed, hold eliquis.  Will need a permacath , ? monday, will need to contact IR Monday, consult placed in sunrise)  H/o dysphagia, placed on dys 3 diet thickened liquids , speech and swallow following  + strongyloide antibody as per ID, 2 days of iverrectin( potential renal transplant candidate)  8/16 VSS - glucose 144 this am - rounds made w/ Dr. Whitney this am - can isolate Pw's & transfer to floor  8/17 VSS - NPO for permacath - call from IR Dr. Barron.  d/t increased BUn -100 - will place Shiley today & transfer over to Permacath later in the week.  pt is NPO   8/18 VSS- permacath placed in IR this am.  Bleeding from site on arrival to floor . IR PA notified - pressure applied - will monitor - may keep pt. one more day to monitor permacath site.   8/22 read fluid overload, suspected pl effusion  L pigtail>700  ml serosang  Extra HD today    Abd  U/S  elevated LFTs  8/23 Eliquis D/C> Dr Hall...Will D/C pigtail 8/24  Resumed preadmission diuretics   LFTs improved  6 SEC CONVERSION PAUSE> BETABLOCKER d/c  8/24   HD   Lt pigtail draining    300 cc  24 hrs w/ eliquis held for pull     US today for elev   LFT

## 2020-08-24 NOTE — PROGRESS NOTE ADULT - ASSESSMENT
63 y/o M w/ PMHx of HTN, CAD (s/p LAD stent in 2009 & 2019, now s/p off-pump C3L w/ Dr. Guillory on 8/13/20, discharged maxime 8/19/20), carotid stenosis, ESRD on HD (MWF Dr. Cardenas, [place] [facility]- on transplant list), DM (on insulin, managed by Jamie Corral), CVA c/b status epilepticus requiring intubation and PEG in 12/2019 (went to rehab, PEG was removed July 2020), dysphagia s/p PEG, atrial fibrillation (on Eliquis), anemia and GI bleed (Feb 2020) who presented to [facility] ED on 8/22 c/o worsening COLE and b/l LE swelling since he was discharged home on 8/19/20.     ESRD on HD ( MWF)  PT from [facility]  s/p cath 8/7  s/p CABG  8/13  s/p perma cath on 8/18  s/p HD on 8/22 with 2.5 L UF  Discussed with CTS--HD today   Discussed with cTS-- Per son( cardiologist) , would like trial of torsemide 40mg QD , and  later metolazone added fro volume control  Plan for Daily HD   Consent obtained from son at [phone]  MOnitor BMP      Anemia  Hb below goal   IGOR with HD  Monitor at present    HTN  Bp stable  Monitor BP  Low salt diet    CKD -BMD  Check PTH  Monitor serum calcium and PO4

## 2020-08-24 NOTE — PROGRESS NOTE ADULT - SUBJECTIVE AND OBJECTIVE BOX
VITAL SIGNS  NSR    50-70  Telemetry:      Vital Signs Last 24 Hrs  T(C): 37 (20 @ 05:10), Max: 37.2 (20 @ 20:25)  T(F): 98.6 (20 @ 05:10), Max: 98.9 (20 @ 20:25)  HR: 56 (20 @ 09:00) (56 - 60)  BP: 141/64 (20 @ 09:00) (122/67 - 143/78)  RR: 18 (20 @ 09:00) (18 - 18)  SpO2: 96% (20 @ 09:00) (96% - 98%)                   Daily     Daily Weight in k.7 (24 Aug 2020 07:17)      Bilirubin Total, Serum: 0.4 mg/dL ( @ 06:28)  Bilirubin Direct, Serum: 0.2 mg/dL ( @ 06:28)    CAPILLARY BLOOD GLUCOSE      POCT Blood Glucose.: 172 mg/dL (24 Aug 2020 07:46)  POCT Blood Glucose.: 203 mg/dL (23 Aug 2020 21:29)  POCT Blood Glucose.: 199 mg/dL (23 Aug 2020 16:54)  POCT Blood Glucose.: 208 mg/dL (23 Aug 2020 11:52)          Drains:             L Pleural                     PHYSICAL EXAM    Neurology: alert and oriented x 3, moves all extremities with no defecits  CV :  RRR  Sternal Wound :  CDI , Stable  Lungs:   CTA B/L  Abdomen: soft, nontender, nondistended, positive bowel sounds  Extremities:     trace pedal edema

## 2020-08-24 NOTE — PROGRESS NOTE ADULT - PROBLEM SELECTOR PLAN 4
left pigtail  draining   eliquis being held for pull in am left pigtail  draining   eliquis being held for pull in am  if drainage down tues

## 2020-08-25 DIAGNOSIS — E11.9 TYPE 2 DIABETES MELLITUS WITHOUT COMPLICATIONS: ICD-10-CM

## 2020-08-25 DIAGNOSIS — G40.901 EPILEPSY, UNSPECIFIED, NOT INTRACTABLE, WITH STATUS EPILEPTICUS: ICD-10-CM

## 2020-08-25 LAB
ALBUMIN SERPL ELPH-MCNC: 3.5 G/DL — SIGNIFICANT CHANGE UP (ref 3.3–5)
ALP SERPL-CCNC: 313 U/L — HIGH (ref 40–120)
ALT FLD-CCNC: 61 U/L — HIGH (ref 10–45)
ANION GAP SERPL CALC-SCNC: 15 MMOL/L — SIGNIFICANT CHANGE UP (ref 5–17)
AST SERPL-CCNC: 26 U/L — SIGNIFICANT CHANGE UP (ref 10–40)
BILIRUB DIRECT SERPL-MCNC: 0.2 MG/DL — SIGNIFICANT CHANGE UP (ref 0–0.2)
BILIRUB INDIRECT FLD-MCNC: 0.3 MG/DL — SIGNIFICANT CHANGE UP (ref 0.2–1)
BILIRUB SERPL-MCNC: 0.5 MG/DL — SIGNIFICANT CHANGE UP (ref 0.2–1.2)
BUN SERPL-MCNC: 57 MG/DL — HIGH (ref 7–23)
CALCIUM SERPL-MCNC: 9.4 MG/DL — SIGNIFICANT CHANGE UP (ref 8.4–10.5)
CHLORIDE SERPL-SCNC: 93 MMOL/L — LOW (ref 96–108)
CO2 SERPL-SCNC: 27 MMOL/L — SIGNIFICANT CHANGE UP (ref 22–31)
CREAT SERPL-MCNC: 5.88 MG/DL — HIGH (ref 0.5–1.3)
GLUCOSE BLDC GLUCOMTR-MCNC: 112 MG/DL — HIGH (ref 70–99)
GLUCOSE BLDC GLUCOMTR-MCNC: 114 MG/DL — HIGH (ref 70–99)
GLUCOSE BLDC GLUCOMTR-MCNC: 239 MG/DL — HIGH (ref 70–99)
GLUCOSE BLDC GLUCOMTR-MCNC: 264 MG/DL — HIGH (ref 70–99)
GLUCOSE BLDC GLUCOMTR-MCNC: 73 MG/DL — SIGNIFICANT CHANGE UP (ref 70–99)
GLUCOSE BLDC GLUCOMTR-MCNC: 79 MG/DL — SIGNIFICANT CHANGE UP (ref 70–99)
GLUCOSE BLDC GLUCOMTR-MCNC: 79 MG/DL — SIGNIFICANT CHANGE UP (ref 70–99)
GLUCOSE SERPL-MCNC: 231 MG/DL — HIGH (ref 70–99)
HCT VFR BLD CALC: 28.1 % — LOW (ref 39–50)
HGB BLD-MCNC: 8.6 G/DL — LOW (ref 13–17)
MAGNESIUM SERPL-MCNC: 2.1 MG/DL — SIGNIFICANT CHANGE UP (ref 1.6–2.6)
MCHC RBC-ENTMCNC: 28.3 PG — SIGNIFICANT CHANGE UP (ref 27–34)
MCHC RBC-ENTMCNC: 30.6 GM/DL — LOW (ref 32–36)
MCV RBC AUTO: 92.4 FL — SIGNIFICANT CHANGE UP (ref 80–100)
NRBC # BLD: 0 /100 WBCS — SIGNIFICANT CHANGE UP (ref 0–0)
PLATELET # BLD AUTO: 477 K/UL — HIGH (ref 150–400)
POTASSIUM SERPL-MCNC: 5.1 MMOL/L — SIGNIFICANT CHANGE UP (ref 3.5–5.3)
POTASSIUM SERPL-SCNC: 5.1 MMOL/L — SIGNIFICANT CHANGE UP (ref 3.5–5.3)
PROT SERPL-MCNC: 7.1 G/DL — SIGNIFICANT CHANGE UP (ref 6–8.3)
RBC # BLD: 3.04 M/UL — LOW (ref 4.2–5.8)
RBC # FLD: 18.1 % — HIGH (ref 10.3–14.5)
SODIUM SERPL-SCNC: 135 MMOL/L — SIGNIFICANT CHANGE UP (ref 135–145)
WBC # BLD: 11.58 K/UL — HIGH (ref 3.8–10.5)
WBC # FLD AUTO: 11.58 K/UL — HIGH (ref 3.8–10.5)

## 2020-08-25 PROCEDURE — 71045 X-RAY EXAM CHEST 1 VIEW: CPT | Mod: 26

## 2020-08-25 PROCEDURE — 99232 SBSQ HOSP IP/OBS MODERATE 35: CPT | Mod: 24

## 2020-08-25 RX ORDER — LEVETIRACETAM 250 MG/1
250 TABLET, FILM COATED ORAL
Refills: 0 | Status: DISCONTINUED | OUTPATIENT
Start: 2020-08-25 | End: 2020-08-26

## 2020-08-25 RX ORDER — LEVETIRACETAM 250 MG/1
250 TABLET, FILM COATED ORAL
Refills: 0 | Status: DISCONTINUED | OUTPATIENT
Start: 2020-08-26 | End: 2020-08-25

## 2020-08-25 RX ORDER — APIXABAN 2.5 MG/1
5 TABLET, FILM COATED ORAL
Refills: 0 | Status: DISCONTINUED | OUTPATIENT
Start: 2020-08-25 | End: 2020-08-26

## 2020-08-25 RX ORDER — LEVETIRACETAM 250 MG/1
125 TABLET, FILM COATED ORAL
Refills: 0 | Status: DISCONTINUED | OUTPATIENT
Start: 2020-08-27 | End: 2020-08-26

## 2020-08-25 RX ORDER — LEVETIRACETAM 250 MG/1
350 TABLET, FILM COATED ORAL ONCE
Refills: 0 | Status: COMPLETED | OUTPATIENT
Start: 2020-08-25 | End: 2020-08-25

## 2020-08-25 RX ORDER — LEVETIRACETAM 250 MG/1
350 TABLET, FILM COATED ORAL
Refills: 0 | Status: DISCONTINUED | OUTPATIENT
Start: 2020-08-25 | End: 2020-08-25

## 2020-08-25 RX ORDER — LEVETIRACETAM 250 MG/1
125 TABLET, FILM COATED ORAL
Refills: 0 | Status: DISCONTINUED | OUTPATIENT
Start: 2020-08-27 | End: 2020-08-25

## 2020-08-25 RX ADMIN — LEVETIRACETAM 350 MILLIGRAM(S): 250 TABLET, FILM COATED ORAL at 14:49

## 2020-08-25 RX ADMIN — Medication 88 MICROGRAM(S): at 05:05

## 2020-08-25 RX ADMIN — Medication 2: at 07:45

## 2020-08-25 RX ADMIN — Medication 3 UNIT(S): at 12:33

## 2020-08-25 RX ADMIN — PANTOPRAZOLE SODIUM 40 MILLIGRAM(S): 20 TABLET, DELAYED RELEASE ORAL at 05:05

## 2020-08-25 RX ADMIN — Medication 81 MILLIGRAM(S): at 14:49

## 2020-08-25 RX ADMIN — LEVETIRACETAM 250 MILLIGRAM(S): 250 TABLET, FILM COATED ORAL at 18:25

## 2020-08-25 RX ADMIN — Medication 40 MILLIGRAM(S): at 05:06

## 2020-08-25 RX ADMIN — Medication 2 UNIT(S): at 17:30

## 2020-08-25 RX ADMIN — APIXABAN 5 MILLIGRAM(S): 2.5 TABLET, FILM COATED ORAL at 18:25

## 2020-08-25 RX ADMIN — Medication 0.5 MILLIGRAM(S): at 12:34

## 2020-08-25 RX ADMIN — Medication 3: at 17:30

## 2020-08-25 RX ADMIN — OXYCODONE HYDROCHLORIDE 5 MILLIGRAM(S): 5 TABLET ORAL at 05:05

## 2020-08-25 RX ADMIN — Medication 3 MILLIGRAM(S): at 21:09

## 2020-08-25 RX ADMIN — Medication 1 TABLET(S): at 14:50

## 2020-08-25 RX ADMIN — CHLORHEXIDINE GLUCONATE 1 APPLICATION(S): 213 SOLUTION TOPICAL at 09:20

## 2020-08-25 RX ADMIN — Medication 3 UNIT(S): at 07:45

## 2020-08-25 RX ADMIN — OXYCODONE HYDROCHLORIDE 5 MILLIGRAM(S): 5 TABLET ORAL at 05:50

## 2020-08-25 RX ADMIN — ATORVASTATIN CALCIUM 80 MILLIGRAM(S): 80 TABLET, FILM COATED ORAL at 21:09

## 2020-08-25 RX ADMIN — INSULIN GLARGINE 8 UNIT(S): 100 INJECTION, SOLUTION SUBCUTANEOUS at 22:39

## 2020-08-25 NOTE — PROGRESS NOTE ADULT - SUBJECTIVE AND OBJECTIVE BOX
VITAL SIGNS    Telemetry:    Vital Signs Last 24 Hrs  T(C): 36.6 (20 @ 05:00), Max: 36.8 (20 @ 12:00)  T(F): 97.8 (20 @ 05:00), Max: 98.3 (20 @ 12:00)  HR: 65 (20 @ 05:00) (55 - 70)  BP: 160/69 (20 @ 05:00) (121/71 - 160/69)  RR: 18 (20 @ 05:00) (18 - 18)  SpO2: 100% (20 @ 05:00) (96% - 100%)             @ 07:01  -   @ 07:00  --------------------------------------------------------  IN: 660 mL / OUT: 2970 mL / NET: -2310 mL       Daily     Daily Weight in k.7 (25 Aug 2020 07:17)  Admit Wt: Drug Dosing Weight  Height (cm): 177.8 (22 Aug 2020 06:36)  Weight (kg): 70 (22 Aug 2020 06:36)  BMI (kg/m2): 22.1 (22 Aug 2020 06:36)  BSA (m2): 1.87 (22 Aug 2020 06:36)    Bilirubin Total, Serum: 0.5 mg/dL ( @ 06:00)  Bilirubin Direct, Serum: 0.2 mg/dL ( @ 06:00)    CAPILLARY BLOOD GLUCOSE      POCT Blood Glucose.: 239 mg/dL (25 Aug 2020 07:37)  POCT Blood Glucose.: 198 mg/dL (24 Aug 2020 21:46)  POCT Blood Glucose.: 284 mg/dL (24 Aug 2020 17:09)  POCT Blood Glucose.: 119 mg/dL (24 Aug 2020 11:48)          apixaban 5 milliGRAM(s) Oral <User Schedule>  aspirin enteric coated 81 milliGRAM(s) Oral daily  atorvastatin 80 milliGRAM(s) Oral at bedtime  chlorhexidine 2% Cloths 1 Application(s) Topical <User Schedule>  insulin glargine Injectable (LANTUS) 8 Unit(s) SubCutaneous at bedtime  insulin lispro (HumaLOG) corrective regimen sliding scale   SubCutaneous three times a day before meals  insulin lispro (HumaLOG) corrective regimen sliding scale   SubCutaneous at bedtime  insulin lispro Injectable (HumaLOG) 3 Unit(s) SubCutaneous before breakfast  insulin lispro Injectable (HumaLOG) 3 Unit(s) SubCutaneous before lunch  insulin lispro Injectable (HumaLOG) 2 Unit(s) SubCutaneous before dinner  levothyroxine 88 MICROGram(s) Oral daily  melatonin 3 milliGRAM(s) Oral at bedtime  Nephro-macie 1 Tablet(s) Oral daily  oxyCODONE    IR 5 milliGRAM(s) Oral every 4 hours PRN  pantoprazole    Tablet 40 milliGRAM(s) Oral before breakfast  polyethylene glycol 3350 17 Gram(s) Oral daily  senna 2 Tablet(s) Oral at bedtime  torsemide 40 milliGRAM(s) Oral daily      PHYSICAL EXAM    Subjective: "Hi.   Neurology: alert and oriented x 3, nonfocal, no gross deficits  CV : tele:  RSR  Sternal Wound :  CDI with dressing , Stable  Lungs: clear. RR easy, unlabored   Abdomen: soft, nontender, nondistended, positive bowel sounds, bowel movement   Neg N/V/D   :  pt voiding without difficulty   Extremities:   CASTILLO; edema, neg calf tenderness.   PPP bilaterally      PW:  Chest tubes: VITAL SIGNS    Telemetry:  rsr/sb 55-60   Vital Signs Last 24 Hrs  T(C): 36.6 (20 @ 05:00), Max: 36.8 (20 @ 12:00)  T(F): 97.8 (20 @ 05:00), Max: 98.3 (20 @ 12:00)  HR: 65 (20 @ 05:00) (55 - 70)  BP: 160/69 (20 @ 05:00) (121/71 - 160/69)  RR: 18 (20 @ 05:00) (18 - 18)  SpO2: 100% (20 @ 05:00) (96% - 100%)             @ 07:01  -   @ 07:00  --------------------------------------------------------  IN: 660 mL / OUT: 2970 mL / NET: -2310 mL       Daily     Daily Weight in k.7 (25 Aug 2020 07:17)  Admit Wt: Drug Dosing Weight  Height (cm): 177.8 (22 Aug 2020 06:36)  Weight (kg): 70 (22 Aug 2020 06:36)  BMI (kg/m2): 22.1 (22 Aug 2020 06:36)  BSA (m2): 1.87 (22 Aug 2020 06:36)    Bilirubin Total, Serum: 0.5 mg/dL ( @ 06:00)  Bilirubin Direct, Serum: 0.2 mg/dL ( @ 06:00)    CAPILLARY BLOOD GLUCOSE      POCT Blood Glucose.: 239 mg/dL (25 Aug 2020 07:37)  POCT Blood Glucose.: 198 mg/dL (24 Aug 2020 21:46)  POCT Blood Glucose.: 284 mg/dL (24 Aug 2020 17:09)  POCT Blood Glucose.: 119 mg/dL (24 Aug 2020 11:48)          apixaban 5 milliGRAM(s) Oral <User Schedule>  aspirin enteric coated 81 milliGRAM(s) Oral daily  atorvastatin 80 milliGRAM(s) Oral at bedtime  chlorhexidine 2% Cloths 1 Application(s) Topical <User Schedule>  insulin glargine Injectable (LANTUS) 8 Unit(s) SubCutaneous at bedtime  insulin lispro (HumaLOG) corrective regimen sliding scale   SubCutaneous three times a day before meals  insulin lispro (HumaLOG) corrective regimen sliding scale   SubCutaneous at bedtime  insulin lispro Injectable (HumaLOG) 3 Unit(s) SubCutaneous before breakfast  insulin lispro Injectable (HumaLOG) 3 Unit(s) SubCutaneous before lunch  insulin lispro Injectable (HumaLOG) 2 Unit(s) SubCutaneous before dinner  levothyroxine 88 MICROGram(s) Oral daily  melatonin 3 milliGRAM(s) Oral at bedtime  Nephro-macie 1 Tablet(s) Oral daily  oxyCODONE    IR 5 milliGRAM(s) Oral every 4 hours PRN  pantoprazole    Tablet 40 milliGRAM(s) Oral before breakfast  polyethylene glycol 3350 17 Gram(s) Oral daily  senna 2 Tablet(s) Oral at bedtime  torsemide 40 milliGRAM(s) Oral daily      PHYSICAL EXAM    Subjective: "I feel ok."   Neurology: alert and oriented x 3, nonfocal, no gross deficits  CV : tele:  RSR/sb 55-60   left permacath cdi with dressing   Sternal Wound :  CDI CLARITZA- healing well   Lungs: clear. RR easy, unlabored; left pigtail draining minimal serous drainage  Abdomen: soft, nontender, nondistended, positive bowel sounds, + bowel movement   Neg N/V/D   Extremities:   CASTILLO; trace LE edema, neg calf tenderness.   PPP bilaterally      PW: no  Chest tubes: left pigtail - draining serous drainage

## 2020-08-25 NOTE — PROGRESS NOTE ADULT - ASSESSMENT
63 yo M w/ PMH HTN, CAD s/p LAD stent 2009, 2019, carotid stenosis, ESRD on HD (MWF Dr Cardenas, [place] [facility]- on transplant list), DM (on IV insulin, controlled, managed by Jamie Corral), CVA c/b status epilepticus requiring intubation and PEG in 12/2019-went to rehab, peg was removed July 2020, dysphagia s/p PEG, atrial fibrillation (on eliquis last dose 8/5/2020), anemia and GI bleed (Feb 2020) who presents today for cardiac angiogram. Patient reports cardiac evaluation for renal transplant- s/p cath-TVD-plan for CABG 8/13/20 with Dr. Guillory.     Son [phone] (07 Aug 2020 06:50)    8/12 VSS; AFB x3 Negative - HD today, plan to remove permacath by IR after HD. Plan for OR with Dr. Guillory for CABG tomorrow 8/13/20.     8/13/20 Off-Pump CABG x 3: LIMA-LAD, LSVG-OM1, LSVG-OM2  Urgent post op dialysis for hyperkalemia, permacath d/c preop, temp catheter placed  He was on a dysphagia diet at home preop, s&s consult pending.  Afib preop, on eliquis, resumed, beta blockers started.  Transferred to sdu.  8/15 The patient has  a central line that will need to be removed, hold eliquis.  Will need a permacath , ? monday, will need to contact IR Monday, consult placed in sunrise)  H/o dysphagia, placed on dys 3 diet thickened liquids , speech and swallow following  + strongyloide antibody as per ID, 2 days of iverrectin( potential renal transplant candidate)  8/16 VSS - glucose 144 this am - rounds made w/ Dr. Whitney this am - can isolate Pw's & transfer to floor  8/17 VSS - NPO for permacath - call from IR Dr. Barron.  d/t increased BUn -100 - will place Shiley today & transfer over to Permacath later in the week.  pt is NPO   8/18 VSS- permacath placed in IR this am.  Bleeding from site on arrival to floor . IR PA notified - pressure applied - will monitor - may keep pt. one more day to monitor permacath site.   8/22 read fluid overload, suspected pl effusion  L pigtail>700  ml serosang  Extra HD today    Abd  U/S  elevated LFTs  8/23 Eliquis D/C> Dr Hall...Will D/C pigtail 8/24  Resumed preadmission diuretics   LFTs improved  6 SEC CONVERSION PAUSE> BETABLOCKER d/c  8/24   HD   Lt pigtail draining    300 cc  24 hrs w/ eliquis held for pull     US today for elev   LFT 65 yo M w/ PMH HTN, CAD s/p LAD stent 2009, 2019, carotid stenosis, ESRD on HD (MWF Dr Cardenas, [place] [facility]- on transplant list), DM (on IV insulin, controlled, managed by Jamie Corral), CVA c/b status epilepticus requiring intubation and PEG in 12/2019-went to rehab, peg was removed July 2020, dysphagia s/p PEG, atrial fibrillation (on eliquis last dose 8/5/2020), anemia and GI bleed (Feb 2020) who presents today for cardiac angiogram. Patient reports cardiac evaluation for renal transplant- s/p cath-TVD-plan for CABG 8/13/20 with Dr. Guillory.   Son [phone] (07 Aug 2020 06:50)  8/12 VSS; AFB x3 Negative - HD today, plan to remove permacath by IR after HD. Plan for OR with Dr. Guillory for CABG tomorrow 8/13/20.     8/13/20 Off-Pump CABG x 3: LIMA-LAD, LSVG-OM1, LSVG-OM2  Urgent post op dialysis for hyperkalemia, permacath d/c preop, temp catheter placed  He was on a dysphagia diet at home preop, s&s consult pending.  Afib preop, on eliquis, resumed, beta blockers started.  Transferred to sdu.  8/15 The patient has  a central line that will need to be removed, hold eliquis.  Will need a permacath , ? monday, will need to contact IR Monday, consult placed in sunrise)  H/o dysphagia, placed on dys 3 diet thickened liquids , speech and swallow following  + strongyloide antibody as per ID, 2 days of iverrectin( potential renal transplant candidate)  8/16 VSS - glucose 144 this am - rounds made w/ Dr. Whitney this am - can isolate Pw's & transfer to floor  8/17 VSS - NPO for permacath - call from IR Dr. Barron.  d/t increased BUn -100 - will place Shiley today & transfer over to Permacath later in the week.  pt is NPO   8/18 VSS- permacath placed in IR this am.  Bleeding from site on arrival to floor . IR PA notified - pressure applied - will monitor - may keep pt. one more day to monitor permacath site.  8/22 read fluid overload, suspected pl effusion  L pigtail>700  ml serosang  Extra HD today    Abd  U/S  elevated LFTs  8/23 Eliquis D/C> Dr Hall...Will D/C pigtail 8/24  Resumed preadmission diuretics   LFTs improved  6 SEC CONVERSION PAUSE> BETABLOCKER d/c  8/24   HD   Lt pigtail draining    300 cc  24 hrs w/ eliquis held for pull     US today for elev   LFT: borderline dilitation of common bile duct AST 26 and ALT 61  8/25 VSS; left pigtail d/c this am- chest xray small left apical ptx- pt asymptomatic; repeat chest xray in am; HD today as per renal; + seizure post HD- resolved spontaneously- ativan .5 mg ivp given; neuro consulted; keppra resumed 250 mg po bid and additional 125 mg on HD days  resume eliquis 5 mg po bid this evening as per Dr. Guillory   Discharge planning- home when stable

## 2020-08-25 NOTE — PROGRESS NOTE ADULT - PROBLEM SELECTOR PLAN 2
ESRD on HD (MWF) via  Permacath   Renal following ESRD on HD (TTS) via  Permacath   Renal following  s/p HD today 8/25

## 2020-08-25 NOTE — CONSULT NOTE ADULT - SUBJECTIVE AND OBJECTIVE BOX
Pt seen for foot/nail care. Toenails noted to be overgrown and mycotic. Toes wrapped with soapy, wet washcloths  prior to trimming and filing of nails. Trimming and filing completed without difficulty, no knicks or cuts. Moisturizer applied to both feet prior to replacing slipper-socks.       *************************************  NEUROLOGY CONSULT  SERVICE  **************************************    CHAD DUNBAR  Male  MRN-[id number]    HPI:  63 y/o M w/ PMHx of HTN, CAD (s/p LAD stent in 2009 & 2019, now s/p off-pump C3L w/ Dr. Guillory on 8/13/20, discharged maxime 8/19/20), carotid stenosis, ESRD on HD (F Dr. Cardenas, [place] [facility] [place]- on transplant list), insulin dependent DM (on insulin, managed by Jamie Corral), CVA c/b status epilepticus requiring intubation and PEG in 12/2019 (PEG was removed July 2020), atrial fibrillation (on Eliquis), anemia and GI bleed (Feb 2020) who presented to [facility] ED on 8/22 c/o worsening COLE and b/l LE swelling since he was discharged home on 8/19/20 after recent CT surg procedure. Neurology consulted because of seizure that was noticed soon after dialysis. The nurse in the room noticed that patient was shaking his arms and legs while grabbing hold of the armrests on the bed for about 30 seconds and immediately after had a glazed look on his eyes for a couple minutes afterward but then returned to baseline mental status and was fully responsive and cooperative. Patient was given 0.25mg ativan after seizure symptoms resolved. The nurse denies any tongue biting during the event or urination (though patient has CKD and is dialysis dependent). Patient notes that he felt he may have had a seizure because of a period of time that he did not remember, but denies any aura-like symptoms such as lightheadedness prior to the event. He also denies current headache after the event. He states he feels tired currently, but attributes the tiredness to 4-5 hours of recent dialysis.    Of note, patient was admitted for SAH in December 2019, during which he was admitted to the ICU for status epilepticus and was difficult to control with keppra, vimpat, and phenobarbatol, but was eventually discharged on keppra (250mg BID with an additional 125mg after dialysis) and Vimpat (15mg BID). Since that admission he has followed with a family friend who is a neurologist, who told him that he did not need the Vimpat anymore and he has reportedly been stable with no seizures on the keppra alone for the last several months. Patient's son works as a cardiologist and was contacted to confirm seizure medication specifics at patient's request, and the son notes that at the time of his last discharge, his medication listing did not include his AEDs; the son notes that he continued giving the keppra to the patient, but that the patient has not been given any keppra since last Friday.         ROS: All negative except as mentioned in HPI    PAST MEDICAL & SURGICAL HISTORY:  ESRD on dialysis: M/W/F (currently getting T, Th, sat in hospital)  Intubation of airway performed without difficulty: Dec 2019  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    MEDICATIONS  (STANDING):  apixaban 5 milliGRAM(s) Oral <User Schedule>  aspirin enteric coated 81 milliGRAM(s) Oral daily  atorvastatin 80 milliGRAM(s) Oral at bedtime  chlorhexidine 2% Cloths 1 Application(s) Topical <User Schedule>  insulin glargine Injectable (LANTUS) 8 Unit(s) SubCutaneous at bedtime  insulin lispro (HumaLOG) corrective regimen sliding scale   SubCutaneous three times a day before meals  insulin lispro (HumaLOG) corrective regimen sliding scale   SubCutaneous at bedtime  insulin lispro Injectable (HumaLOG) 3 Unit(s) SubCutaneous before breakfast  insulin lispro Injectable (HumaLOG) 3 Unit(s) SubCutaneous before lunch  insulin lispro Injectable (HumaLOG) 2 Unit(s) SubCutaneous before dinner  levETIRAcetam 250 milliGRAM(s) Oral two times a day  levETIRAcetam 125 milliGRAM(s) Oral <User Schedule>  levETIRAcetam  Solution 350 milliGRAM(s) Oral once  levothyroxine 88 MICROGram(s) Oral daily  melatonin 3 milliGRAM(s) Oral at bedtime  Nephro-macie 1 Tablet(s) Oral daily  pantoprazole    Tablet 40 milliGRAM(s) Oral before breakfast  polyethylene glycol 3350 17 Gram(s) Oral daily  senna 2 Tablet(s) Oral at bedtime  torsemide 40 milliGRAM(s) Oral daily    MEDICATIONS  (PRN):  oxyCODONE    IR 5 milliGRAM(s) Oral every 4 hours PRN Moderate Pain (4 - 6)    Allergies    No Known Allergies    Intolerances        VITAL SIGNS:  Vital Signs Last 24 Hrs  T(C): 36.6 (25 Aug 2020 12:45), Max: 36.9 (25 Aug 2020 09:10)  T(F): 97.8 (25 Aug 2020 12:45), Max: 98.4 (25 Aug 2020 09:10)  HR: 67 (25 Aug 2020 12:45) (59 - 70)  BP: 155/71 (25 Aug 2020 12:45) (110/51 - 160/69)  BP(mean): 99 (25 Aug 2020 12:45) (98 - 99)  RR: 18 (25 Aug 2020 12:45) (18 - 18)  SpO2: 100% (25 Aug 2020 12:45) (96% - 100%)    PHYSICAL EXAMINATION:  General: Well-developed, well nourished, in no acute distress.  Eyes: Conjunctiva and sclera clear.  Neck: Supple, nontender    Neurologic:  - Mental Status:  Alert, awake, oriented to person, place, and time; Speech is mildly dysarthric but with intact naming, repetition, and comprehension; follows complex commands, no extinction or neglect noted;   - Cranial Nerves II-XII:  VFF, No nystagmus noted, EOMI, PERRLA, V1-V3 intact, no facial asymmetry, t/p midline, SCM/trap intact.  - Motor:  Strength is 5/5 throughout.  There is no pronator drift.  Normal muscle bulk and tone throughout. No myoclonus or tremor.  - Reflexes: 2+ biceps, triceps, patellar, ankle reflexes. Plantar responses flexor.  - Sensory:  Intact to light touch, pinprick throughout.  - Coordination:  Finger-nose-finger without dysmetria.  - Gait:   Normal steps, base, arm swing, and turning. Romberg testing is negative.    LABS:                          8.6    11.58 )-----------( 477      ( 25 Aug 2020 06:00 )             28.1     08-25    135  |  93<L>  |  57<H>  ----------------------------<  231<H>  5.1   |  27  |  5.88<H>    Ca    9.4      25 Aug 2020 06:00  Mg     2.1     08-25    TPro  7.1  /  Alb  3.5  /  TBili  0.5  /  DBili  0.2  /  AST  26  /  ALT  61<H>  /  AlkPhos  313<H>  08-25      RADIOLOGY & ADDITIONAL STUDIES:    No current relevant imaging studies. *************************************  NEUROLOGY CONSULT  SERVICE  **************************************    CHAD DUNBAR  Male  MRN-[id number]    HPI:  63 y/o M w/ PMHx of HTN, CAD (s/p LAD stent in 2009 & 2019, now s/p off-pump C3L w/ Dr. Guillory on 8/13/20, discharged maxime 8/19/20), carotid stenosis, ESRD on HD (F Dr. Cardenas, [place] [facility] [place]- on transplant list), insulin dependent DM (on insulin, managed by Jamie Corral), CVA c/b status epilepticus requiring intubation and PEG in 12/2019 (PEG was removed July 2020), atrial fibrillation (on Eliquis), anemia and GI bleed (Feb 2020) who presented to [facility] ED on 8/22 c/o worsening COLE and b/l LE swelling since he was discharged home on 8/19/20 after recent CT surg procedure. Neurology consulted because of seizure that was noticed soon after dialysis. The nurse in the room noticed that patient was shaking his arms and legs while grabbing hold of the armrests on the bed for about 30 seconds and immediately after had a glazed look on his eyes for a couple minutes afterward but then returned to baseline mental status and was fully responsive and cooperative. Patient was given 0.25mg ativan after seizure symptoms resolved. The nurse denies any tongue biting during the event or urination (though patient has CKD and is dialysis dependent). Patient notes that he felt he may have had a seizure because of a period of time that he did not remember, but denies any aura-like symptoms such as lightheadedness prior to the event. He also denies current headache after the event. He states he feels tired currently, but attributes the tiredness to 4-5 hours of recent dialysis.    Of note, patient was admitted to OSH for AMS found to have stroke in December 2019, during which he was admitted to the ICU for status epilepticus and was difficult to control with keppra, vimpat, and phenobarbatol, and then transferred here for continued management of status epilepticus. He was eventually discharged on keppra (250mg BID with an additional 125mg after dialysis) and Vimpat (15mg BID). Since that admission he has followed with a family friend who is a neurologist, who told him that he did not need the Vimpat anymore and he has reportedly been stable with no seizures on the keppra alone for the last several months. Patient's son works as a cardiologist and was contacted to confirm seizure medication specifics at patient's request, and the son notes that at the time of his last discharge, his medication listing did not include his AEDs; the son notes that he continued giving the keppra to the patient, but that the patient has not been given any keppra since last Friday. Son also agrees that patient has generally been seizure free the last several months on just the keppra.        ROS: All negative except as mentioned in HPI    PAST MEDICAL & SURGICAL HISTORY:  ESRD on dialysis: M/W/F (currently getting T, Th, sat in hospital)  Intubation of airway performed without difficulty: Dec 2019  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    MEDICATIONS  (STANDING):  apixaban 5 milliGRAM(s) Oral <User Schedule>  aspirin enteric coated 81 milliGRAM(s) Oral daily  atorvastatin 80 milliGRAM(s) Oral at bedtime  chlorhexidine 2% Cloths 1 Application(s) Topical <User Schedule>  insulin glargine Injectable (LANTUS) 8 Unit(s) SubCutaneous at bedtime  insulin lispro (HumaLOG) corrective regimen sliding scale   SubCutaneous three times a day before meals  insulin lispro (HumaLOG) corrective regimen sliding scale   SubCutaneous at bedtime  insulin lispro Injectable (HumaLOG) 3 Unit(s) SubCutaneous before breakfast  insulin lispro Injectable (HumaLOG) 3 Unit(s) SubCutaneous before lunch  insulin lispro Injectable (HumaLOG) 2 Unit(s) SubCutaneous before dinner  levETIRAcetam 250 milliGRAM(s) Oral two times a day  levETIRAcetam 125 milliGRAM(s) Oral <User Schedule>  levETIRAcetam  Solution 350 milliGRAM(s) Oral once  levothyroxine 88 MICROGram(s) Oral daily  melatonin 3 milliGRAM(s) Oral at bedtime  Nephro-macie 1 Tablet(s) Oral daily  pantoprazole    Tablet 40 milliGRAM(s) Oral before breakfast  polyethylene glycol 3350 17 Gram(s) Oral daily  senna 2 Tablet(s) Oral at bedtime  torsemide 40 milliGRAM(s) Oral daily    MEDICATIONS  (PRN):  oxyCODONE    IR 5 milliGRAM(s) Oral every 4 hours PRN Moderate Pain (4 - 6)    Allergies    No Known Allergies    Intolerances        VITAL SIGNS:  Vital Signs Last 24 Hrs  T(C): 36.6 (25 Aug 2020 12:45), Max: 36.9 (25 Aug 2020 09:10)  T(F): 97.8 (25 Aug 2020 12:45), Max: 98.4 (25 Aug 2020 09:10)  HR: 67 (25 Aug 2020 12:45) (59 - 70)  BP: 155/71 (25 Aug 2020 12:45) (110/51 - 160/69)  BP(mean): 99 (25 Aug 2020 12:45) (98 - 99)  RR: 18 (25 Aug 2020 12:45) (18 - 18)  SpO2: 100% (25 Aug 2020 12:45) (96% - 100%)    PHYSICAL EXAMINATION:  General: Well-developed, well nourished, in no acute distress.  Eyes: Conjunctiva and sclera clear.  Neck: Supple, nontender    Neurologic:  - Mental Status:  Alert, awake, oriented to person, place, and time; Speech is mildly dysarthric but with intact naming, repetition, and comprehension; follows complex commands, no extinction or neglect noted;   - Cranial Nerves II-XII:  VFF, No nystagmus noted, EOMI, PERRLA, V1-V3 intact, no facial asymmetry, t/p midline, SCM/trap intact.  - Motor:  Strength is 5/5 throughout.  There is no pronator drift.  Normal muscle bulk and tone throughout. No myoclonus or tremor.  - Reflexes: 2+ biceps, triceps, patellar, ankle reflexes. Plantar responses flexor.  - Sensory:  Intact to light touch, pinprick throughout.  - Coordination:  Finger-nose-finger without dysmetria.  - Gait:   Normal steps, base, arm swing, and turning. Romberg testing is negative.    LABS:                          8.6    11.58 )-----------( 477      ( 25 Aug 2020 06:00 )             28.1     08-25    135  |  93<L>  |  57<H>  ----------------------------<  231<H>  5.1   |  27  |  5.88<H>    Ca    9.4      25 Aug 2020 06:00  Mg     2.1     08-25    TPro  7.1  /  Alb  3.5  /  TBili  0.5  /  DBili  0.2  /  AST  26  /  ALT  61<H>  /  AlkPhos  313<H>  08-25      RADIOLOGY & ADDITIONAL STUDIES:    No current relevant imaging studies.

## 2020-08-25 NOTE — PROGRESS NOTE ADULT - SUBJECTIVE AND OBJECTIVE BOX
[facility] [place] [facility]   MD NINA MASON MD RUORU WONG, PA    TEL:  OFFICE: [phone]  DR HSU CELL: [phone]  RAS MORELOS CELL: [phone]  DR. MARQUEZ CELL: [phone]  DR. ALEX CELL: [phone]    FROM 5 PM - 7 AM PLEASE CALL ANSWERING SERVICE: [phone]    RENAL FOLLOW UP NOTE  --------------------------------------------------------------------------------  HPI:      Pt seen and examined at bedside.       PAST HISTORY  --------------------------------------------------------------------------------  No significant changes to PMH, PSH, FHx, SHx, unless otherwise noted    ALLERGIES & MEDICATIONS  --------------------------------------------------------------------------------  Allergies    No Known Allergies    Intolerances      Standing Inpatient Medications  apixaban 5 milliGRAM(s) Oral <User Schedule>  aspirin enteric coated 81 milliGRAM(s) Oral daily  atorvastatin 80 milliGRAM(s) Oral at bedtime  chlorhexidine 2% Cloths 1 Application(s) Topical <User Schedule>  insulin glargine Injectable (LANTUS) 8 Unit(s) SubCutaneous at bedtime  insulin lispro (HumaLOG) corrective regimen sliding scale   SubCutaneous three times a day before meals  insulin lispro (HumaLOG) corrective regimen sliding scale   SubCutaneous at bedtime  insulin lispro Injectable (HumaLOG) 3 Unit(s) SubCutaneous before breakfast  insulin lispro Injectable (HumaLOG) 3 Unit(s) SubCutaneous before lunch  insulin lispro Injectable (HumaLOG) 2 Unit(s) SubCutaneous before dinner  levothyroxine 88 MICROGram(s) Oral daily  melatonin 3 milliGRAM(s) Oral at bedtime  Nephro-macie 1 Tablet(s) Oral daily  pantoprazole    Tablet 40 milliGRAM(s) Oral before breakfast  polyethylene glycol 3350 17 Gram(s) Oral daily  senna 2 Tablet(s) Oral at bedtime  torsemide 40 milliGRAM(s) Oral daily    PRN Inpatient Medications  oxyCODONE    IR 5 milliGRAM(s) Oral every 4 hours PRN      REVIEW OF SYSTEMS  --------------------------------------------------------------------------------  General: no fever  CVS: no chest pain  ABD: no abdominal pain  : no dysuria,  MSK: + edema     VITALS/PHYSICAL EXAM  --------------------------------------------------------------------------------  T(C): 36.6 (08-25-20 @ 05:00), Max: 36.8 (08-24-20 @ 12:00)  HR: 65 (08-25-20 @ 05:00) (55 - 70)  BP: 160/69 (08-25-20 @ 05:00) (121/71 - 160/69)  RR: 18 (08-25-20 @ 05:00) (18 - 18)  SpO2: 100% (08-25-20 @ 05:00) (96% - 100%)  Wt(kg): --        08-24-20 @ 07:01  -  08-25-20 @ 07:00  --------------------------------------------------------  IN: 660 mL / OUT: 2970 mL / NET: -2310 mL      Physical Exam:  	Gen: NAD  	HEENT: MMM  	Pulm: decreased breath sounds B/L  	CV: S1S2  	Abd: Soft, +BS  	Ext: + LE edema B/L                      Neuro: Awake alert  	Skin: Warm and Dry   	Vascular access: perma cath          FABIÁN tucker  LABS/STUDIES  --------------------------------------------------------------------------------              8.6    11.58 >-----------<  477      [08-25-20 @ 06:00]              28.1     135  |  93  |  57  ----------------------------<  231      [08-25-20 @ 06:00]  5.1   |  27  |  5.88        Ca     9.4     [08-25-20 @ 06:00]      Mg     2.1     [08-25-20 @ 06:00]    TPro  7.1  /  Alb  3.5  /  TBili  0.5  /  DBili  0.2  /  AST  26  /  ALT  61  /  AlkPhos  313  [08-25-20 @ 06:00]          Creatinine Trend:  SCr 5.88 [08-25 @ 06:00]  SCr 6.59 [08-24 @ 06:28]  SCr 4.96 [08-23 @ 06:08]  SCr 5.29 [08-22 @ 02:25]  SCr 7.42 [08-19 @ 04:54]        Iron 45, TIBC 145, %sat 31      [12-26-19 @ 23:24]  Ferritin 1369      [12-26-19 @ 23:24]  PTH -- (Ca 8.4)      [02-21-20 @ 08:54]   93  HbA1c 6.0      [02-21-20 @ 08:53]  TSH 8.13      [08-16-20 @ 07:53]  Lipid: chol 124, TG 59, HDL 64, LDL 47      [08-08-20 @ 00:47]    HCV 0.24, Nonreact      [08-09-20 @ 19:37]  HIV Nonreact      [08-09-20 @ 19:26]

## 2020-08-25 NOTE — PROGRESS NOTE ADULT - PROBLEM SELECTOR PLAN 3
+QuantiFeron gold   ABF x 3 Negative   ID following

## 2020-08-25 NOTE — PROGRESS NOTE ADULT - PROBLEM SELECTOR PLAN 7
seizure post HD- resolved spontaneously- ativan .5 mg ivp given;   neuro consulted;   keppra resumed 250 mg po bid and additional 125 mg on HD days  d/w Dr. Guillory  no need for further neurological imaging at this time as per neuro

## 2020-08-25 NOTE — PROGRESS NOTE ADULT - PROBLEM SELECTOR PLAN 5
adb U/S pending   today  LFT sl improving adb U/S pending done borderline dilitation of common bile duct AST 26 and ALT 61  LFT's improving

## 2020-08-25 NOTE — PROGRESS NOTE ADULT - PROBLEM SELECTOR PLAN 1
Chest PT,  Incentive spirometry, wound care and assessment.  Ambulate continue postop care  no bb at this time due to sinus salima  continue asa and statin  d/c left pigtail  pain management  pulm toilet  increase activity as tolerated  Discharge planning- home when stable

## 2020-08-25 NOTE — PROGRESS NOTE ADULT - PROBLEM SELECTOR PLAN 4
left pigtail  draining   eliquis being held for pull in am  if drainage down tues s/p left pigtail - d/c this am  chest xray revealed small left apical ptx  pt asymptomatic  ck chest xray in am 8/26

## 2020-08-25 NOTE — PROGRESS NOTE ADULT - PROBLEM SELECTOR PROBLEM 6
Bradyarrhythmia
Bradyarrhythmia
Type 2 diabetes mellitus without complication, unspecified whether long term insulin use

## 2020-08-25 NOTE — PROGRESS NOTE ADULT - ASSESSMENT
63 y/o M w/ PMHx of HTN, CAD (s/p LAD stent in 2009 & 2019, now s/p off-pump C3L w/ Dr. Guillory on 8/13/20, discharged maxime 8/19/20), carotid stenosis, ESRD on HD (MWF Dr. Cardenas, [place] [facility]- on transplant list), DM (on insulin, managed by Jamie Corral), CVA c/b status epilepticus requiring intubation and PEG in 12/2019 (went to rehab, PEG was removed July 2020), dysphagia s/p PEG, atrial fibrillation (on Eliquis), anemia and GI bleed (Feb 2020) who presented to [facility] ED on 8/22 c/o worsening COLE and b/l LE swelling since he was discharged home on 8/19/20.     ESRD on HD ( MWF)  PT from [facility]  s/p cath 8/7  s/p CABG  8/13  s/p perma cath on 8/18  s/p HD on 8/24  with 2.7 L UF  Plan for HD daily   Per son( cardiologist) ,torsemide started however pt reports no improvement in urine outpt  Consent obtained from son at [phone]  MOnitor BMP      Anemia  Hb below goal   IGOR with HD  Monitor at present    HTN  Bp elevated  COnsider starting hydralazine 25mg TID ir BP remains elevated post HD  Monitor BP  Low salt diet    CKD -BMD  Check PTH  Monitor serum calcium and PO4

## 2020-08-25 NOTE — PROGRESS NOTE ADULT - PROBLEM SELECTOR PROBLEM 3
TB (pulmonary tuberculosis)

## 2020-08-25 NOTE — CONSULT NOTE ADULT - ASSESSMENT
63 yo male with complex medical history including ESRD (on dialysis) and past SAH c/b status epilepticus on chronic AED treatment with new generalized seizure soon after dialysis today. Currently asymptomatic and per additional history, patient has not been receiving his regular AED medications for the last several days, indicating that seizure is likely 2/2 to medication noncompliance since admission to the hospital. No acute findings on neurological exam concerning for current further workup.    Impression: generalized seizure 2/2 medication noncompliance in setting of known recurrent seizures s/p past SAH    Recommendations:  [] restart patient's home AEDs - keppra 250mg BID with extra 125mg after dialysis  [] no need for urgent imaging or EEG at this time given likely etiology of recent seizure  [] if acute change in mental status, would obtain CT head to evaluate for recurrent hemorrhage  [] if continued seizures suspected after restarting home keppra, patient will need vEEG, MRI brain w/o, and can restart Vimpat    Case and plan to be discussed with neurology attending Dr. Wilhelm. 65 yo male with complex medical history including ESRD (on dialysis) and past stroke c/b status epilepticus on chronic AED treatment with new generalized seizure soon after dialysis today. Currently asymptomatic and per additional history, patient has not been receiving his regular AED medications for the last several days, indicating that seizure is likely 2/2 to medication noncompliance since admission to the hospital. No acute findings on neurological exam concerning for current further workup at this time; patient appears back to baseline.    Impression: generalized seizure 2/2 medication noncompliance in setting of known recurrent seizures s/p past stroke    Recommendations:  [] restart patient's home AEDs - keppra 250mg BID with extra 125mg after dialysis  [] no need for urgent imaging or EEG at this time given likely etiology of recent seizure of medication noncompliance with return to baseline soon after.  [] if acute change in mental status, would obtain MRI head and vEEG to evaluate for recurrent seizures, possible subclinical seizures  [] if recurrent seizures, may require initiation of Vimpat as patient received before.    Case and plan discussed with neurology attending Dr. Wilhelm.

## 2020-08-25 NOTE — PROGRESS NOTE ADULT - PROBLEM SELECTOR PLAN 6
6 second conversion pause>betablocker D/C as per Dr Hall
6 second conversion pause>betablocker D/C as per Dr Hall
diabetic diet  continue lantus and humalog   accuchecks ac and hs

## 2020-08-25 NOTE — CONSULT NOTE ADULT - ATTENDING COMMENTS
I performed a history and physical examination of the patient and discussed the management of the patient with the resident. I reviewed the resident's note and agree with the documented findings and plan of care with the following additions/exceptions.    DOS 8/26/20    pls note that some notes in system mention he had hx SAH but he didn't. He had ischemic strokes for which he got tpa 12'19, the CT that mentions SAH was in fact just the contrast enhancement and the subsequent MRI 12/15/19 shows there were no hemorrhages, but there were numerous acute diffuse b/l supra and infratentorial infracts, swi neg. stroke course was c/b status but was able to be tapered off phb to just keppra and vimpat before dc and then as outpatient his neurologist took off vimpat. the keppra monotherapy was continued until recent mixup when he was off of it for a few days and in this context he had a breadkthru seizure.    this morning he says he hasn't had any other episodes.  alert, fluent, oriented to 8/26/20 and [facility]. accented speech with maybe dysarthria but couldn't be sure. R facial weakness is mild and mild R arm drift, questionable L arm upward drift.  ffm symm and slow, leg marching symm but little slow.    rec cont home AED regimen  pls reconsult with new questions

## 2020-08-26 ENCOUNTER — TRANSCRIPTION ENCOUNTER (OUTPATIENT)
Age: 65
End: 2020-08-26

## 2020-08-26 VITALS
DIASTOLIC BLOOD PRESSURE: 67 MMHG | WEIGHT: 151.02 LBS | OXYGEN SATURATION: 98 % | HEART RATE: 68 BPM | RESPIRATION RATE: 17 BRPM | TEMPERATURE: 98 F | SYSTOLIC BLOOD PRESSURE: 132 MMHG

## 2020-08-26 PROBLEM — N18.6 END STAGE RENAL DISEASE: Chronic | Status: ACTIVE | Noted: 2020-08-22

## 2020-08-26 LAB
ANION GAP SERPL CALC-SCNC: 16 MMOL/L — SIGNIFICANT CHANGE UP (ref 5–17)
BUN SERPL-MCNC: 50 MG/DL — HIGH (ref 7–23)
CALCIUM SERPL-MCNC: 9.3 MG/DL — SIGNIFICANT CHANGE UP (ref 8.4–10.5)
CHLORIDE SERPL-SCNC: 88 MMOL/L — LOW (ref 96–108)
CO2 SERPL-SCNC: 26 MMOL/L — SIGNIFICANT CHANGE UP (ref 22–31)
CREAT SERPL-MCNC: 5.38 MG/DL — HIGH (ref 0.5–1.3)
GLUCOSE BLDC GLUCOMTR-MCNC: 204 MG/DL — HIGH (ref 70–99)
GLUCOSE BLDC GLUCOMTR-MCNC: 249 MG/DL — HIGH (ref 70–99)
GLUCOSE SERPL-MCNC: 228 MG/DL — HIGH (ref 70–99)
HCT VFR BLD CALC: 28.4 % — LOW (ref 39–50)
HGB BLD-MCNC: 8.9 G/DL — LOW (ref 13–17)
MCHC RBC-ENTMCNC: 28.3 PG — SIGNIFICANT CHANGE UP (ref 27–34)
MCHC RBC-ENTMCNC: 31.3 GM/DL — LOW (ref 32–36)
MCV RBC AUTO: 90.2 FL — SIGNIFICANT CHANGE UP (ref 80–100)
NRBC # BLD: 0 /100 WBCS — SIGNIFICANT CHANGE UP (ref 0–0)
PLATELET # BLD AUTO: 516 K/UL — HIGH (ref 150–400)
POTASSIUM SERPL-MCNC: 4.5 MMOL/L — SIGNIFICANT CHANGE UP (ref 3.5–5.3)
POTASSIUM SERPL-SCNC: 4.5 MMOL/L — SIGNIFICANT CHANGE UP (ref 3.5–5.3)
RBC # BLD: 3.15 M/UL — LOW (ref 4.2–5.8)
RBC # FLD: 17.6 % — HIGH (ref 10.3–14.5)
SODIUM SERPL-SCNC: 130 MMOL/L — LOW (ref 135–145)
WBC # BLD: 11.76 K/UL — HIGH (ref 3.8–10.5)
WBC # FLD AUTO: 11.76 K/UL — HIGH (ref 3.8–10.5)

## 2020-08-26 PROCEDURE — 99261: CPT

## 2020-08-26 PROCEDURE — 85027 COMPLETE CBC AUTOMATED: CPT

## 2020-08-26 PROCEDURE — 83735 ASSAY OF MAGNESIUM: CPT

## 2020-08-26 PROCEDURE — U0003: CPT

## 2020-08-26 PROCEDURE — 80048 BASIC METABOLIC PNL TOTAL CA: CPT

## 2020-08-26 PROCEDURE — 99222 1ST HOSP IP/OBS MODERATE 55: CPT

## 2020-08-26 PROCEDURE — 93306 TTE W/DOPPLER COMPLETE: CPT

## 2020-08-26 PROCEDURE — 84132 ASSAY OF SERUM POTASSIUM: CPT

## 2020-08-26 PROCEDURE — 97161 PT EVAL LOW COMPLEX 20 MIN: CPT

## 2020-08-26 PROCEDURE — 93005 ELECTROCARDIOGRAM TRACING: CPT

## 2020-08-26 PROCEDURE — 99238 HOSP IP/OBS DSCHRG MGMT 30/<: CPT | Mod: 24

## 2020-08-26 PROCEDURE — 96374 THER/PROPH/DIAG INJ IV PUSH: CPT

## 2020-08-26 PROCEDURE — 71045 X-RAY EXAM CHEST 1 VIEW: CPT

## 2020-08-26 PROCEDURE — 76700 US EXAM ABDOM COMPLETE: CPT

## 2020-08-26 PROCEDURE — 82962 GLUCOSE BLOOD TEST: CPT

## 2020-08-26 PROCEDURE — 82248 BILIRUBIN DIRECT: CPT

## 2020-08-26 PROCEDURE — 99285 EMERGENCY DEPT VISIT HI MDM: CPT | Mod: 25

## 2020-08-26 PROCEDURE — 80053 COMPREHEN METABOLIC PANEL: CPT

## 2020-08-26 PROCEDURE — 71045 X-RAY EXAM CHEST 1 VIEW: CPT | Mod: 26

## 2020-08-26 PROCEDURE — 84484 ASSAY OF TROPONIN QUANT: CPT

## 2020-08-26 RX ORDER — LEVETIRACETAM 250 MG/1
1 TABLET, FILM COATED ORAL
Qty: 0 | Refills: 0 | DISCHARGE
Start: 2020-08-26

## 2020-08-26 RX ORDER — PANTOPRAZOLE SODIUM 20 MG/1
1 TABLET, DELAYED RELEASE ORAL
Qty: 0 | Refills: 0 | DISCHARGE
Start: 2020-08-26

## 2020-08-26 RX ORDER — LEVOTHYROXINE SODIUM 125 MCG
1 TABLET ORAL
Qty: 0 | Refills: 0 | DISCHARGE
Start: 2020-08-26

## 2020-08-26 RX ORDER — FUROSEMIDE 40 MG
1 TABLET ORAL
Qty: 0 | Refills: 0 | DISCHARGE

## 2020-08-26 RX ORDER — ASPIRIN/CALCIUM CARB/MAGNESIUM 324 MG
1 TABLET ORAL
Qty: 0 | Refills: 0 | DISCHARGE
Start: 2020-08-26

## 2020-08-26 RX ORDER — APIXABAN 2.5 MG/1
2 TABLET, FILM COATED ORAL
Qty: 0 | Refills: 0 | DISCHARGE
Start: 2020-08-26

## 2020-08-26 RX ORDER — OXYCODONE HYDROCHLORIDE 5 MG/1
1 TABLET ORAL
Qty: 16 | Refills: 0
Start: 2020-08-26 | End: 2020-08-29

## 2020-08-26 RX ORDER — LEVETIRACETAM 250 MG/1
125 TABLET, FILM COATED ORAL ONCE
Refills: 0 | Status: COMPLETED | OUTPATIENT
Start: 2020-08-26 | End: 2020-08-26

## 2020-08-26 RX ORDER — LEVETIRACETAM 250 MG/1
125 TABLET, FILM COATED ORAL
Qty: 0 | Refills: 0 | DISCHARGE
Start: 2020-08-26

## 2020-08-26 RX ORDER — METOPROLOL TARTRATE 50 MG
1 TABLET ORAL
Qty: 60 | Refills: 0
Start: 2020-08-26 | End: 2020-09-24

## 2020-08-26 RX ORDER — LEVETIRACETAM 250 MG/1
2 TABLET, FILM COATED ORAL
Qty: 0 | Refills: 0 | DISCHARGE
Start: 2020-08-26

## 2020-08-26 RX ORDER — SENNA PLUS 8.6 MG/1
2 TABLET ORAL
Qty: 0 | Refills: 0 | DISCHARGE
Start: 2020-08-26

## 2020-08-26 RX ORDER — ATORVASTATIN CALCIUM 80 MG/1
1 TABLET, FILM COATED ORAL
Qty: 0 | Refills: 0 | DISCHARGE
Start: 2020-08-26

## 2020-08-26 RX ORDER — APIXABAN 2.5 MG/1
1 TABLET, FILM COATED ORAL
Qty: 0 | Refills: 0 | DISCHARGE
Start: 2020-08-26

## 2020-08-26 RX ADMIN — APIXABAN 5 MILLIGRAM(S): 2.5 TABLET, FILM COATED ORAL at 05:34

## 2020-08-26 RX ADMIN — Medication 1 TABLET(S): at 11:33

## 2020-08-26 RX ADMIN — Medication 88 MICROGRAM(S): at 05:34

## 2020-08-26 RX ADMIN — OXYCODONE HYDROCHLORIDE 5 MILLIGRAM(S): 5 TABLET ORAL at 08:19

## 2020-08-26 RX ADMIN — LEVETIRACETAM 250 MILLIGRAM(S): 250 TABLET, FILM COATED ORAL at 05:34

## 2020-08-26 RX ADMIN — OXYCODONE HYDROCHLORIDE 5 MILLIGRAM(S): 5 TABLET ORAL at 09:00

## 2020-08-26 RX ADMIN — Medication 3 UNIT(S): at 08:13

## 2020-08-26 RX ADMIN — Medication 2: at 11:54

## 2020-08-26 RX ADMIN — Medication 2: at 08:13

## 2020-08-26 RX ADMIN — Medication 3 UNIT(S): at 11:55

## 2020-08-26 RX ADMIN — Medication 81 MILLIGRAM(S): at 11:32

## 2020-08-26 RX ADMIN — Medication 40 MILLIGRAM(S): at 05:34

## 2020-08-26 RX ADMIN — LEVETIRACETAM 125 MILLIGRAM(S): 250 TABLET, FILM COATED ORAL at 16:42

## 2020-08-26 RX ADMIN — PANTOPRAZOLE SODIUM 40 MILLIGRAM(S): 20 TABLET, DELAYED RELEASE ORAL at 05:34

## 2020-08-26 NOTE — DISCHARGE NOTE PROVIDER - NSDCHHNEEDSERVICE_GEN_ALL_CORE
Teaching and training/Medication teaching and assessment/Observation and assessment/Wound care and assessment

## 2020-08-26 NOTE — PHYSICAL THERAPY INITIAL EVALUATION ADULT - PLANNED THERAPY INTERVENTIONS, PT EVAL
gait training/transfer training/Pt to negotiate 5 steps with single handrail with independence in 4 weeks.

## 2020-08-26 NOTE — PROGRESS NOTE ADULT - ASSESSMENT
63 y/o M w/ PMHx of HTN, CAD (s/p LAD stent in 2009 & 2019, now s/p off-pump C3L w/ Dr. Guillory on 8/13/20, discharged maxime 8/19/20), carotid stenosis, ESRD on HD (MWF Dr. Cardenas, [facility]- on transplant list), DM (on insulin, managed by Jamie Corral), CVA c/b status epilepticus requiring intubation and PEG in 12/2019 (went to rehab, PEG was removed July 2020), dysphagia s/p PEG, atrial fibrillation (on Eliquis), anemia and GI bleed (Feb 2020) who presented to [facility] ED on 8/22 c/o worsening COLE and b/l LE swelling since he was discharged home on 8/19/20.     ESRD on HD ( MWF)  PT from [facility]  s/p cath 8/7  s/p CABG  8/13  s/p perma cath on 8/18  s/p HD on 8/25   with 2.7 L UF  Plan for HD daily   Per son( cardiologist) ,torsemide started however pt reports no improvement in urine outpt  Consent obtained from son at [phone]  MOnitor BMP      Anemia  Hb below goal   IGOR with HD  Monitor at present    HTN  Bp elevated  COnsider starting hydralazine 25mg TID ir BP remains elevated post HD  Monitor BP  Low salt diet    CKD -BMD  Check PTH  Monitor serum calcium and PO4

## 2020-08-26 NOTE — PHYSICAL THERAPY INITIAL EVALUATION ADULT - PERTINENT HX OF CURRENT PROBLEM, REHAB EVAL
65 y/o M w/ PMHx of HTN, CAD (s/p LAD stent in 2009 & 2019, now s/p off-pump C3L w/ Dr. Guillory on 8/13/20, discharged home 8/19/20), carotid stenosis, ESRD on HD (MWF), insulin dependent DM, CVA c/b status epilepticus, atrial fibrillation, presented to [facility] ED on 8/22 c/o worsening COLE and b/l LE swelling since he was discharged home on 8/19/20 after recent CT surg procedure. Pt generalized seizure on 8/25/50 2/2 medication noncompliance in setting of known recurrent seizures s/p past stroke.

## 2020-08-26 NOTE — DISCHARGE NOTE PROVIDER - NSDCCPCAREPLAN_GEN_ALL_CORE_FT
PRINCIPAL DISCHARGE DIAGNOSIS  Diagnosis: Pleural effusion  Assessment and Plan of Treatment: shower daily  weigh yourself daily  continue current prescriptions as ordered  increase activity as tolerated   no added salt; low fat; low cholesterol, low salt diabetic no concentrated potassium diet  check blood sugar levels before meals and at bedtime- record levels  dialysis every mon, wed and friday as per nephrologist   follow up with Cardiologist in 1-2 weeks. Call to schedule appointment.  follow up with cardiac surgeon, Dr. Guillory on Sept 10th at 8:45 am. Call to confirm appointment.   follow up with endocrinologist in 2-3 weeks. Call to schedule appointment.   follow up with neurologist, CAll to schedule appointment.

## 2020-08-26 NOTE — PROGRESS NOTE ADULT - REASON FOR ADMISSION
fluid overload
8/13 C3 off pump CABG  8/21 Readm fluid overload, pl effusion
fluid overload
fluid overload
fluid overload   sp pl eff   with pigtail placed

## 2020-08-26 NOTE — PROGRESS NOTE ADULT - SUBJECTIVE AND OBJECTIVE BOX
[facility]   MD NINA MASON MD RUORU WONG, PA    TEL:  OFFICE: [phone]  DR HSU CELL: [phone]  RAS MORELOS CELL: [phone]  DR. MARQUEZ CELL: [phone]  DR. ALEX CELL: [phone]    FROM 5 PM - 7 AM PLEASE CALL ANSWERING SERVICE: [phone]    RENAL FOLLOW UP NOTE  --------------------------------------------------------------------------------  HPI:      Pt seen and examined at bedside.       PAST HISTORY  --------------------------------------------------------------------------------  No significant changes to PMH, PSH, FHx, SHx, unless otherwise noted    ALLERGIES & MEDICATIONS  --------------------------------------------------------------------------------  Allergies    No Known Allergies    Intolerances      Standing Inpatient Medications  apixaban 5 milliGRAM(s) Oral <User Schedule>  aspirin enteric coated 81 milliGRAM(s) Oral daily  atorvastatin 80 milliGRAM(s) Oral at bedtime  insulin glargine Injectable (LANTUS) 8 Unit(s) SubCutaneous at bedtime  insulin lispro (HumaLOG) corrective regimen sliding scale   SubCutaneous three times a day before meals  insulin lispro (HumaLOG) corrective regimen sliding scale   SubCutaneous at bedtime  insulin lispro Injectable (HumaLOG) 3 Unit(s) SubCutaneous before breakfast  insulin lispro Injectable (HumaLOG) 3 Unit(s) SubCutaneous before lunch  insulin lispro Injectable (HumaLOG) 2 Unit(s) SubCutaneous before dinner  levETIRAcetam 250 milliGRAM(s) Oral two times a day  levothyroxine 88 MICROGram(s) Oral daily  melatonin 3 milliGRAM(s) Oral at bedtime  Nephro-macie 1 Tablet(s) Oral daily  pantoprazole    Tablet 40 milliGRAM(s) Oral before breakfast  polyethylene glycol 3350 17 Gram(s) Oral daily  senna 2 Tablet(s) Oral at bedtime  torsemide 40 milliGRAM(s) Oral daily    PRN Inpatient Medications  oxyCODONE    IR 5 milliGRAM(s) Oral every 4 hours PRN      REVIEW OF SYSTEMS  --------------------------------------------------------------------------------  General: no fever  ,  MSK: + edema     VITALS/PHYSICAL EXAM  --------------------------------------------------------------------------------  T(C): 36.6 (08-26-20 @ 04:50), Max: 37 (08-25-20 @ 20:26)  HR: 66 (08-26-20 @ 04:50) (60 - 75)  BP: 159/70 (08-26-20 @ 04:50) (110/51 - 159/70)  RR: 18 (08-26-20 @ 04:50) (18 - 18)  SpO2: 98% (08-26-20 @ 04:50) (96% - 100%)  Wt(kg): --        08-25-20 @ 07:01  -  08-26-20 @ 07:00  --------------------------------------------------------  IN: 580 mL / OUT: 2700 mL / NET: -2120 mL    08-26-20 @ 07:01  -  08-26-20 @ 09:34  --------------------------------------------------------  IN: 220 mL / OUT: 0 mL / NET: 220 mL      Physical Exam:  	Gen: NAD  	HEENT: MMM  	Pulm: CTA B/L  	CV: S1S2  	Abd: Soft, +BS  	Ext: No LE edema B/L                      Neuro: Awake   	Skin: Warm and Dry   	Vascular access: perma luis miguel tucker  LABS/STUDIES  --------------------------------------------------------------------------------              8.9    11.76 >-----------<  516      [08-26-20 @ 06:59]              28.4     130  |  88  |  50  ----------------------------<  228      [08-26-20 @ 06:59]  4.5   |  26  |  5.38        Ca     9.3     [08-26-20 @ 06:59]      Mg     2.1     [08-25-20 @ 06:00]    TPro  7.1  /  Alb  3.5  /  TBili  0.5  /  DBili  0.2  /  AST  26  /  ALT  61  /  AlkPhos  313  [08-25-20 @ 06:00]          Creatinine Trend:  SCr 5.38 [08-26 @ 06:59]  SCr 5.88 [08-25 @ 06:00]  SCr 6.59 [08-24 @ 06:28]  SCr 4.96 [08-23 @ 06:08]  SCr 5.29 [08-22 @ 02:25]        Iron 45, TIBC 145, %sat 31      [12-26-19 @ 23:24]  Ferritin 1369      [12-26-19 @ 23:24]  PTH -- (Ca 8.4)      [02-21-20 @ 08:54]   93  HbA1c 6.0      [02-21-20 @ 08:53]  TSH 8.13      [08-16-20 @ 07:53]  Lipid: chol 124, TG 59, HDL 64, LDL 47      [08-08-20 @ 00:47]    HCV 0.24, Nonreact      [08-09-20 @ 19:37]  HIV Nonreact      [08-09-20 @ 19:26]

## 2020-08-26 NOTE — DISCHARGE NOTE NURSING/CASE MANAGEMENT/SOCIAL WORK - NSDCPETBCESMAN_GEN_ALL_CORE
If you are a smoker, it is important for your health to stop smoking. Please be aware that second hand smoke is also harmful. Zoë Phan)  Urology  [address] [place] [address], [address]  [place] [address]  Phone: [phone]  Fax: [phone]  Follow Up Time: Routine

## 2020-08-26 NOTE — DISCHARGE NOTE PROVIDER - HOSPITAL COURSE
63 yo M w/ PMH HTN, CAD s/p LAD stent 2009, 2019, carotid stenosis, ESRD on HD (MWF Dr Cardenas, [place] [facility]- on transplant list), DM (on IV insulin, controlled, managed by Jamie Corral), CVA c/b status epilepticus requiring intubation and PEG in 12/2019-went to rehab, peg was removed July 2020, dysphagia s/p PEG, atrial fibrillation (on eliquis last dose 8/5/2020), anemia and GI bleed (Feb 2020) who presents today for cardiac angiogram. Patient reports cardiac evaluation for renal transplant- s/p cath-TVD-plan for CABG 8/13/20 with Dr. Guillory.     Son [phone] (07 Aug 2020 06:50)    8/12 VSS; AFB x3 Negative - HD today, plan to remove permacath by IR after HD. Plan for OR with Dr. Guillory for CABG tomorrow 8/13/20.         8/13/20 Off-Pump CABG x 3: LIMA-LAD, LSVG-OM1, LSVG-OM2    Urgent post op dialysis for hyperkalemia, permacath d/c preop, temp catheter placed    He was on a dysphagia diet at home preop, s&s consult pending.    Afib preop, on eliquis, resumed, beta blockers started.    Transferred to sdu.    8/15 The patient has  a central line that will need to be removed, hold eliquis.    Will need a permacath , ? monday, will need to contact IR Monday, consult placed in sunrise)    H/o dysphagia, placed on dys 3 diet thickened liquids , speech and swallow following    + strongyloide antibody as per ID, 2 days of iverrectin( potential renal transplant candidate)    8/16 VSS - glucose 144 this am - rounds made w/ Dr. Whitney this am - can isolate Pw's & transfer to floor    8/17 VSS - NPO for permacath - call from IR Dr. Barron.  d/t increased BUn -100 - will place Shiley today & transfer over to Permacath later in the week.  pt is NPO     8/18 VSS- permacath placed in IR this am.  Bleeding from site on arrival to floor . IR PA notified - pressure applied - will monitor - may keep pt. one more day to monitor permacath site.    8/22 read fluid overload, suspected pl effusion  L pigtail>700  ml serosang  Extra HD today    Abd  U/S  elevated LFTs    8/23 Eliquis D/C> Dr Hall...Will D/C pigtail 8/24  Resumed preadmission diuretics   LFTs improved  6 SEC CONVERSION PAUSE> BETABLOCKER d/c    8/24   HD   Lt pigtail draining    300 cc  24 hrs w/ eliquis held for pull     US today for elev   LFT: borderline dilitation of common bile duct AST 26 and ALT 61    8/25 VSS; left pigtail d/c this am- chest xray small left apical ptx- pt asymptomatic; repeat chest xray in am; HD today as per renal; + seizure post HD- resolved spontaneously- ativan .5 mg ivp given; neuro consulted; keppra resumed 250 mg po bid and additional 125 mg on HD days    resume eliquis 5 mg po bid this evening as per Dr. Guillory     8/26 VSS; HD this am; additional keppra after HD today; xray 8/26 unchanged; 98% ra;  Discharge pt home today as per Dr. Guillory after HD

## 2020-08-26 NOTE — DISCHARGE NOTE PROVIDER - NSDCACTIVITY_GEN_ALL_CORE
Do not drive or operate machinery/Stairs allowed/Walking - Indoors allowed/No heavy lifting/straining/Sex allowed/Do not make important decisions/Walking - Outdoors allowed/Showering allowed

## 2020-08-26 NOTE — DISCHARGE NOTE PROVIDER - NSDCMRMEDTOKEN_GEN_ALL_CORE_FT
apixaban 5 mg oral tablet: 1 tab(s) orally every 12 hours  aspirin 81 mg oral delayed release tablet: 1 tab(s) orally once a day  atorvastatin 80 mg oral tablet: 1 tab(s) orally once a day  Epogen 10,000 units/mL preservative-free injectable solution: injectable once a week with dialysis  Lasix 40 mg oral tablet: 1 tab(s) orally once a day  Levemir 100 units/mL subcutaneous solution: 8 unit(s) subcutaneous once a day (at bedtime)   levothyroxine 88 mcg (0.088 mg) oral tablet: 1 tab(s) orally once a day  melatonin 3 mg oral tablet: 1 tab(s) orally once a day (at bedtime)  metoprolol tartrate 50 mg oral tablet: 1 tab(s) orally every 8 hours  NovoLOG FlexPen 100 units/mL injectable solution: injectable 3 times a day  3 in the morning and evening and 2 units at night  pantoprazole 40 mg oral delayed release tablet: 1 tab(s) orally once a day (before a meal)  Liz-Windy oral tablet: 1 tab(s) orally once a day   senna oral tablet: 1 tab(s) orally once a day (at bedtime) PRN for constipation apixaban 5 mg oral tablet: 1 tab(s) orally 2 times a day  aspirin 81 mg oral delayed release tablet: 1 tab(s) orally once a day  atorvastatin 80 mg oral tablet: 1 tab(s) orally once a day (at bedtime)  Epogen 10,000 units/mL preservative-free injectable solution: injectable once a week with dialysis  Levemir 100 units/mL subcutaneous solution: 8 unit(s) subcutaneous once a day (at bedtime)   levETIRAcetam: 125 milligram(s) orally 3 times a week after dialysis   levETIRAcetam 250 mg oral tablet: 1 tab(s) orally 2 times a day  levothyroxine 88 mcg (0.088 mg) oral tablet: 1 tab(s) orally once a day  melatonin 3 mg oral tablet: 1 tab(s) orally once a day (at bedtime)  metoprolol tartrate 25 mg oral tablet: 1 tab(s) orally 2 times a day   NovoLOG FlexPen 100 units/mL injectable solution: injectable 3 times a day  3 in the morning and evening and 2 units at night  oxyCODONE 5 mg oral tablet: 1 tab(s) orally every 6 hours, As Needed -Moderate Pain (4 - 6) MDD:4  pantoprazole 40 mg oral delayed release tablet: 1 tab(s) orally once a day (before a meal)  Liz-Windy oral tablet: 1 tab(s) orally once a day   senna oral tablet: 2 tab(s) orally once a day (at bedtime)  torsemide 20 mg oral tablet: 2 tab(s) (total 40 mg) orally once a day

## 2020-08-26 NOTE — DISCHARGE NOTE PROVIDER - CARE PROVIDER_API CALL
Julian Guillory  SURGERY  [address] [place], [place] [address]  Phone: [phone]  Fax: [phone]  Follow Up Time:

## 2020-08-26 NOTE — PHYSICAL THERAPY INITIAL EVALUATION ADULT - GENERAL OBSERVATIONS, REHAB EVAL
Pt rec'd seated in chair, +tele. Pt agreeable to PT evaluation. Pt premedicated for session due to R shoulder pain.

## 2020-08-26 NOTE — DISCHARGE NOTE PROVIDER - NSDCPNSUBOBJ_GEN_ALL_CORE
VSS    tele: RSR 60-70    midsternal incision cdi meliza - sternum stable    lt permacath cdi w/ dressing    lungs clear, RR easy unlabored    + bs nt nd + bm; neg n/v/d    LE: neg calf tenderness, trace LE edema, PPP bilaterally; left SVG site cdi meliza        Discharge pt home today as per Dr. Guillory after HD

## 2020-08-26 NOTE — DISCHARGE NOTE NURSING/CASE MANAGEMENT/SOCIAL WORK - PATIENT PORTAL LINK FT
You can access the FollowMyHealth Patient Portal offered by [facility] by registering at the following website: [email or web address]. By joining [facility]’s FollowMyHealth portal, you will also be able to view your health information using other applications (apps) compatible with our system.

## 2020-08-26 NOTE — PHYSICAL THERAPY INITIAL EVALUATION ADULT - ADDITIONAL COMMENTS
Pt reports living in private house with wife and son. 5 steps to enter with handrail. PTA pt ambulates independently with straight cane or rolling walker (no device walking between rooms in house). Pt receives assistance from wife for showering, cooking, shopping. Owns straight cane, rolling walker and shower chair. Pt reports living in private house with wife and son. 5 steps to enter with handrail. PTA pt ambulates independently with straight cane or rolling walker (no device walking between rooms in house). Pt receives assistance from wife for showering, dressing, cooking, shopping. Owns straight cane, rolling walker and shower chair.

## 2020-08-26 NOTE — DISCHARGE NOTE PROVIDER - NSDCFUADDINST_GEN_ALL_CORE_FT
call Dr. Guillory for fever > 101  refer to cardiac surgery do and don't checklist  no driving until cleared by Dr. Guillory  dialysis every mon, wed and friday   check blood sugar levels before meals and at bedtime- record levels

## 2020-08-26 NOTE — DISCHARGE NOTE PROVIDER - NSDCFUSCHEDAPPT_GEN_ALL_CORE_FT
CHAD DUNBAR ; 09/03/2020 ; NPP Cardio [address] [facility] ; 09/03/2020 ; NPP CT Surg [address] [facility] ; 09/10/2020 ; NPP CT Surg [facility] Uriarte ; 09/24/2020 ; NPP Nephro 400 [facility]  on lovastatin 40mg outpt, cont statin. CHAD DUNBAR ; 09/03/2020 ; NPP Cardio [address] [facility] ; 09/03/2020 ; NPP CT Surg [facility] Uriarte ; 09/10/2020 ; NPP CT Surg [address] [facility] Uriarte ; 09/24/2020 ; NPP Nephro 400 [facility]

## 2020-08-29 NOTE — CHART NOTE - NSCHARTNOTEFT_GEN_A_CORE
Patient had pleural effusion as a sequel from CABG procedure.   Small apical PTX most likely complication from CT insertion, now resolved.

## 2020-08-31 ENCOUNTER — APPOINTMENT (OUTPATIENT)
Dept: CARE COORDINATION | Facility: HOME HEALTH | Age: 65
End: 2020-08-31
Payer: MEDICARE

## 2020-08-31 VITALS — DIASTOLIC BLOOD PRESSURE: 64 MMHG | SYSTOLIC BLOOD PRESSURE: 152 MMHG

## 2020-08-31 PROCEDURE — 99024 POSTOP FOLLOW-UP VISIT: CPT

## 2020-09-01 RX ORDER — FOLIC ACID/VIT B COMPLEX AND C 0.8 MG
TABLET ORAL
Refills: 0 | Status: ACTIVE | COMMUNITY

## 2020-09-01 RX ORDER — ACETAMINOPHEN 325 MG/1
325 TABLET ORAL EVERY 6 HOURS
Refills: 0 | Status: ACTIVE | COMMUNITY
Start: 2020-08-20

## 2020-09-01 RX ORDER — GLUCOSAMINE HCL/CHONDROITIN SU 500-400 MG
3 CAPSULE ORAL AT BEDTIME
Refills: 0 | Status: ACTIVE | COMMUNITY
Start: 2020-08-20

## 2020-09-01 NOTE — PHYSICAL EXAM
[Sclera] : the sclera and conjunctiva were normal [] : no respiratory distress [Abnormal Walk] : normal gait [Skin Color & Pigmentation] : normal skin color and pigmentation [Oriented To Time, Place, And Person] : oriented to person, place, and time [FreeTextEntry1] : MTI approximated, CT sites with black sutures, leg harvest site intact

## 2020-09-01 NOTE — ASSESSMENT
[FreeTextEntry1] : 63 yo M w/ PMH HTN, CAD s/p LAD stent 2009, 2019, carotid stenosis, ESRD on HD \par (MWF Dr Cardenas, [place]- on transplant list), DM (on IV \par insulin, controlled, managed by Jamie Corral), CVA c/b status epilepticus \par requiring intubation and PEG in 12/2019-went to rehab, peg was removed July \par 2020, dysphagia s/p PEG, atrial fibrillation (on eliquis last dose 8/5/2020), \par anemia and GI bleed (Feb 2020) \par 8/13/20 Off-Pump CABG x 3 with Dr Guillory\par Readmitted 22-Aug-2020 2/2 SOB- left pleural effusion s/p drainage

## 2020-09-01 NOTE — HISTORY OF PRESENT ILLNESS
[Home] : at home, [unfilled] , at the time of the visit. [Other Location: e.g. Home (Enter Location, City,State)___] : at [unfilled] [Family Member] : family member [Verbal consent obtained from patient] : the patient, [unfilled] [Diabetes Mellitus] : Diabetes Mellitus [FreeTextEntry1] : FOLLOW YOUR HEART HOME VISIT-[employer]\par Home Visit made Nicole DUNBAR ] . A  patient of Dr Angela Guillory. S/P CABG x3  on 8/14. Discharged from [facility].  Readmitted 2/2 fluid overload requiring daily dialysis with fluid removal (ultrafiltration)\par \par \par Pt seen with son.  He appears well.  S/P HD today and 1.5 L removal. Still making urine. He states he is very tired.  Has been very fatigued now after a long week of daily dialysis while readmitted .\par Son Denies seizure activity, taking Keppra as prescribed\par \par Blood sugars have been elevated, high as 244.  Son states he thinks it may be due to increased frequency of taking in [place].\par son has not been giving prescribed dose of Levemir 8 units at night as of yet. He had been giving lesser amount of levemir to avoid hypoglycemia.\par \par \par \par \par

## 2020-09-01 NOTE — REVIEW OF SYSTEMS
[As Noted in HPI] : as noted in HPI [Feeling Tired] : feeling tired [SOB on Exertion] : shortness of breath during exertion [Negative] : Heme/Lymph

## 2020-09-03 ENCOUNTER — APPOINTMENT (OUTPATIENT)
Dept: CARDIOTHORACIC SURGERY | Facility: CLINIC | Age: 65
End: 2020-09-03

## 2020-09-03 ENCOUNTER — NON-APPOINTMENT (OUTPATIENT)
Age: 65
End: 2020-09-03

## 2020-09-03 ENCOUNTER — APPOINTMENT (OUTPATIENT)
Dept: CARDIOLOGY | Facility: CLINIC | Age: 65
End: 2020-09-03
Payer: MEDICARE

## 2020-09-03 VITALS
WEIGHT: 140.65 LBS | HEART RATE: 68 BPM | HEIGHT: 70 IN | OXYGEN SATURATION: 100 % | BODY MASS INDEX: 20.14 KG/M2 | SYSTOLIC BLOOD PRESSURE: 189 MMHG | DIASTOLIC BLOOD PRESSURE: 77 MMHG

## 2020-09-03 PROCEDURE — 93000 ELECTROCARDIOGRAM COMPLETE: CPT

## 2020-09-03 PROCEDURE — 99214 OFFICE O/P EST MOD 30 MIN: CPT

## 2020-09-03 RX ORDER — LEVOTHYROXINE SODIUM 0.05 MG/1
50 TABLET ORAL
Qty: 90 | Refills: 0 | Status: DISCONTINUED | COMMUNITY
Start: 2020-07-08

## 2020-09-03 RX ORDER — BLOOD SUGAR DIAGNOSTIC
STRIP MISCELLANEOUS
Qty: 100 | Refills: 0 | Status: DISCONTINUED | COMMUNITY
Start: 2020-07-01

## 2020-09-03 RX ORDER — LACOSAMIDE 50 MG/1
50 TABLET, FILM COATED ORAL
Qty: 60 | Refills: 0 | Status: DISCONTINUED | COMMUNITY
Start: 2020-04-20

## 2020-09-03 RX ORDER — SEVELAMER CARBONATE 800 MG/1
800 TABLET, FILM COATED ORAL
Qty: 270 | Refills: 0 | Status: DISCONTINUED | COMMUNITY
Start: 2020-07-11

## 2020-09-03 RX ORDER — OMEPRAZOLE 40 MG/1
40 CAPSULE, DELAYED RELEASE ORAL
Qty: 90 | Refills: 0 | Status: DISCONTINUED | COMMUNITY
Start: 2020-04-16

## 2020-09-03 RX ORDER — LEVETIRACETAM 250 MG/1
250 TABLET, FILM COATED ORAL
Qty: 75 | Refills: 0 | Status: DISCONTINUED | COMMUNITY
Start: 2020-04-15

## 2020-09-03 RX ORDER — TELMISARTAN 20 MG/1
20 TABLET ORAL
Qty: 90 | Refills: 0 | Status: DISCONTINUED | COMMUNITY
Start: 2020-04-15

## 2020-09-03 NOTE — PHYSICAL EXAM
[General Appearance - Well Developed] : well developed [Normal Appearance] : normal appearance [General Appearance - Well Nourished] : well nourished [Well Groomed] : well groomed [No Deformities] : no deformities [General Appearance - In No Acute Distress] : no acute distress [Normal Conjunctiva] : the conjunctiva exhibited no abnormalities [Eyelids - No Xanthelasma] : the eyelids demonstrated no xanthelasmas [No Oral Pallor] : no oral pallor [No Oral Cyanosis] : no oral cyanosis [Normal Oral Mucosa] : normal oral mucosa [Normal Jugular Venous A Waves Present] : normal jugular venous A waves present [Normal Jugular Venous V Waves Present] : normal jugular venous V waves present [No Jugular Venous Rodriguez A Waves] : no jugular venous rodriguez A waves [Respiration, Rhythm And Depth] : normal respiratory rhythm and effort [Heart Rate And Rhythm] : heart rate and rhythm were normal [Auscultation Breath Sounds / Voice Sounds] : lungs were clear to auscultation bilaterally [Exaggerated Use Of Accessory Muscles For Inspiration] : no accessory muscle use [Heart Sounds] : normal S1 and S2 [FreeTextEntry1] : midsternal wound healing well [Murmurs] : no murmurs present [Abdomen Soft] : soft [Abdomen Tenderness] : non-tender [Abdomen Mass (___ Cm)] : no abdominal mass palpated [Gait - Sufficient For Exercise Testing] : the gait was sufficient for exercise testing [Abnormal Walk] : normal gait [Nail Clubbing] : no clubbing of the fingernails [Cyanosis, Localized] : no localized cyanosis [Petechial Hemorrhages (___cm)] : no petechial hemorrhages [Skin Color & Pigmentation] : normal skin color and pigmentation [] : no rash [No Venous Stasis] : no venous stasis [Skin Lesions] : no skin lesions [No Skin Ulcers] : no skin ulcer [No Xanthoma] : no  xanthoma was observed [Affect] : the affect was normal [Oriented To Time, Place, And Person] : oriented to person, place, and time [Mood] : the mood was normal [No Anxiety] : not feeling anxious

## 2020-09-03 NOTE — DISCUSSION/SUMMARY
[___ Month(s)] : [unfilled] month(s) [FreeTextEntry1] : The patient is a 64-year-old gentleman diabetes mellitus, hypertension, hyperlipidemia, hypothyroidism, ESRD, coronary artery disease s/p stroke, afib, s/p 3V CABG who is hypertensive.\par #1 CAD- s/p CABG, euvolemic\par #2 Htn- stop toprol, start coreg at 3.125mg and amlodipine 2.5mg, taper as tolerated, hold telmesartan for now, son monitors closely\par #3 ESRD- on dialysis,  transplant pending\par #4 DM- on insulin, good control\par #5 Afib- on eliquis, no bleeding\par #6 Neuro- on keppra til end of month\par \par

## 2020-09-03 NOTE — HISTORY OF PRESENT ILLNESS
[FreeTextEntry1] : Srinivasan is 3 weeks post op. Readmitted fluid overloaded and removed 20 pounds over four days of dialysis. Still feels week but improving. Here with his son.

## 2020-09-03 NOTE — REASON FOR VISIT
[Follow-Up - From Hospitalization] : follow-up of a recent hospitalization for [CABG Follow-up] : bypass graft

## 2020-09-06 NOTE — ED PROVIDER NOTE - EKG #1 DATE/TIME
Pt able to ambulate safely and steadily w/out assistance, denies dizziness/weakness upon standing, patients IV was removed and the catheter is in tact, pt discharged home and paperwork was signed, reviewed with patient. Vital Signs recorded in the EMR, pt given follow up instructions and discharge treatment plan. Pt education deemed successful at time of discharge after teach back proves proficiency. Pt has no distress at time of discharge, pt provided discharge instructions, follow up care and results reviewed by MD. Reinforced by the RN at time of discharge. Pt educated about coumadin and coumadin clinic, pt states understanding.
22-Aug-2020 02:45

## 2020-09-08 ENCOUNTER — APPOINTMENT (OUTPATIENT)
Dept: ENDOCRINOLOGY | Facility: CLINIC | Age: 65
End: 2020-09-08
Payer: MEDICARE

## 2020-09-08 VITALS
HEART RATE: 74 BPM | TEMPERATURE: 97.9 F | WEIGHT: 140 LBS | DIASTOLIC BLOOD PRESSURE: 74 MMHG | SYSTOLIC BLOOD PRESSURE: 150 MMHG | HEIGHT: 70 IN | BODY MASS INDEX: 20.04 KG/M2 | OXYGEN SATURATION: 97 %

## 2020-09-08 PROCEDURE — 99205 OFFICE O/P NEW HI 60 MIN: CPT

## 2020-09-08 RX ORDER — LANCETS
EACH MISCELLANEOUS
Qty: 2 | Refills: 5 | Status: ACTIVE | COMMUNITY
Start: 2020-09-08 | End: 1900-01-01

## 2020-09-08 NOTE — REVIEW OF SYSTEMS
[Fatigue] : no fatigue [Decreased Appetite] : appetite not decreased [Recent Weight Loss (___ Lbs)] : no recent weight loss [Recent Weight Gain (___ Lbs)] : no recent weight gain [Visual Field Defect] : no visual field defect [Dysphagia] : no dysphagia [Dry Eyes] : no dryness [Dysphonia] : no dysphonia [Neck Pain] : no neck pain [Nasal Congestion] : no nasal congestion [Slow Heart Rate] : heart rate is not slow [Chest Pain] : no chest pain [Palpitations] : no palpitations [Shortness Of Breath] : no shortness of breath [Fast Heart Rate] : heart rate is not fast [Cough] : no cough [Nausea] : no nausea [Constipation] : no constipation [Diarrhea] : no diarrhea [Polyuria] : no polyuria [Vomiting] : no vomiting [Joint Pain] : no joint pain [Hesistancy] : no hesitancy [Muscle Weakness] : no muscle weakness [Headaches] : no headaches [Acanthosis] : no acanthosis  [Dizziness] : no dizziness [Depression] : no depression [Tremors] : no tremors [Polydipsia] : no polydipsia [Cold Intolerance] : no cold intolerance [Easy Bleeding] : no ~M tendency for easy bleeding [Easy Bruising] : no tendency for easy bruising

## 2020-09-08 NOTE — PHYSICAL EXAM
[Alert] : alert [Normal Sclera/Conjunctiva] : normal sclera/conjunctiva [No Acute Distress] : no acute distress [Normal Outer Ear/Nose] : the ears and nose were normal in appearance [EOMI] : extra ocular movement intact [PERRL] : pupils equal, round and reactive to light [Normal Hearing] : hearing was normal [No Neck Mass] : no neck mass was observed [Normal TMs] : both tympanic membranes were normal [No Respiratory Distress] : no respiratory distress [Clear to Auscultation] : lungs were clear to auscultation bilaterally [Thyroid Not Enlarged] : the thyroid was not enlarged [Regular Rhythm] : with a regular rhythm [Normal Rate] : heart rate was normal [Normal S1, S2] : normal S1 and S2 [Soft] : abdomen soft [Not Tender] : non-tender [Normal Bowel Sounds] : normal bowel sounds [No Clubbing, Cyanosis] : no clubbing  or cyanosis of the fingernails [Normal Gait] : normal gait [No Joint Swelling] : no joint swelling seen [Normal Strength/Tone] : muscle strength and tone were normal [No Rash] : no rash [No Skin Lesions] : no skin lesions [Normal Reflexes] : deep tendon reflexes were 2+ and symmetric [No Motor Deficits] : the motor exam was normal [No Tremors] : no tremors [Normal Affect] : the affect was normal [Oriented x3] : oriented to person, place, and time [Normal Insight/Judgement] : insight and judgment were intact [Normal Mood] : the mood was normal

## 2020-09-08 NOTE — ASSESSMENT
[FreeTextEntry1] : 64 year old male with recent history of CABG, ESRD ( on renal transplant list), DM Type 2 requiring insulin, hypothyroidism here for evaluation of DM Type 2 \par \par DM Type 2 ( HgA1C of 6.6%)\par -patient is highly sensitive to insulin and carbs \par -At this time will decrease the Levemir to 5 units at bedtime\par -Humalog 3-4-3 \par -SS # 1 \par -Advised to cut back on high carb snacks \par -Will order eric for the patient to be able to manage glycemic variation \par -Hypoglcemia treatment discussed \par \par History of CABG\par -Goal HgA1C of 7%\par \par ESRD\par -Increases risk for hypoglycemia\par -Treatment has been discussed \par \par Hypothyroidism\par -TFTs on next visit \par -Continue Levothyroxine 88 mcg daily \par \par -Send glucose log in one week \par -Follow up in 3 months

## 2020-09-10 ENCOUNTER — APPOINTMENT (OUTPATIENT)
Dept: CARDIOTHORACIC SURGERY | Facility: CLINIC | Age: 65
End: 2020-09-10
Payer: MEDICARE

## 2020-09-10 VITALS
HEIGHT: 70 IN | HEART RATE: 70 BPM | DIASTOLIC BLOOD PRESSURE: 58 MMHG | WEIGHT: 138.45 LBS | BODY MASS INDEX: 19.82 KG/M2 | OXYGEN SATURATION: 100 % | RESPIRATION RATE: 14 BRPM | TEMPERATURE: 98 F | SYSTOLIC BLOOD PRESSURE: 97 MMHG

## 2020-09-10 PROCEDURE — 99024 POSTOP FOLLOW-UP VISIT: CPT

## 2020-09-10 RX ORDER — POLYETHYLENE GLYCOL 3350 17 G/17G
17 POWDER, FOR SOLUTION ORAL
Refills: 0 | Status: COMPLETED | COMMUNITY
Start: 2020-08-20 | End: 2020-09-10

## 2020-09-10 RX ORDER — PANTOPRAZOLE 40 MG/1
40 TABLET, DELAYED RELEASE ORAL
Refills: 0 | Status: COMPLETED | COMMUNITY
Start: 2020-08-20 | End: 2020-09-10

## 2020-09-10 RX ORDER — OXYCODONE 5 MG/1
5 TABLET ORAL EVERY 6 HOURS
Refills: 0 | Status: COMPLETED | COMMUNITY
Start: 2020-08-20 | End: 2020-09-10

## 2020-09-18 ENCOUNTER — TRANSCRIPTION ENCOUNTER (OUTPATIENT)
Age: 65
End: 2020-09-18

## 2020-09-24 ENCOUNTER — APPOINTMENT (OUTPATIENT)
Dept: NEPHROLOGY | Facility: CLINIC | Age: 65
End: 2020-09-24
Payer: COMMERCIAL

## 2020-09-24 PROCEDURE — 99215 OFFICE O/P EST HI 40 MIN: CPT | Mod: 95

## 2020-09-30 LAB
CULTURE RESULTS: SIGNIFICANT CHANGE UP
SPECIMEN SOURCE: SIGNIFICANT CHANGE UP

## 2020-10-01 ENCOUNTER — NON-APPOINTMENT (OUTPATIENT)
Age: 65
End: 2020-10-01

## 2020-10-01 ENCOUNTER — APPOINTMENT (OUTPATIENT)
Dept: CARDIOLOGY | Facility: CLINIC | Age: 65
End: 2020-10-01
Payer: MEDICARE

## 2020-10-01 VITALS
OXYGEN SATURATION: 99 % | HEART RATE: 66 BPM | WEIGHT: 136.68 LBS | BODY MASS INDEX: 19.61 KG/M2 | RESPIRATION RATE: 14 BRPM | TEMPERATURE: 97.7 F

## 2020-10-01 VITALS — DIASTOLIC BLOOD PRESSURE: 81 MMHG | SYSTOLIC BLOOD PRESSURE: 178 MMHG

## 2020-10-01 PROCEDURE — 99214 OFFICE O/P EST MOD 30 MIN: CPT

## 2020-10-01 RX ORDER — CARVEDILOL 3.12 MG/1
3.12 TABLET, FILM COATED ORAL TWICE DAILY
Refills: 0 | Status: DISCONTINUED | COMMUNITY
Start: 2020-09-10 | End: 2020-10-01

## 2020-10-01 RX ORDER — AMLODIPINE BESYLATE 2.5 MG/1
2.5 TABLET ORAL DAILY
Qty: 1 | Refills: 0 | Status: DISCONTINUED | COMMUNITY
Start: 2020-09-10 | End: 2020-10-01

## 2020-10-01 RX ORDER — TORSEMIDE 20 MG/1
20 TABLET ORAL DAILY
Refills: 0 | Status: DISCONTINUED | COMMUNITY
End: 2020-10-01

## 2020-10-01 NOTE — DISCUSSION/SUMMARY
[___ Month(s)] : [unfilled] month(s) [FreeTextEntry1] : The patient is a 64-year-old gentleman diabetes mellitus, hypertension, hyperlipidemia, hypothyroidism, ESRD, coronary artery disease s/p stroke, afib, s/p 3V CABG who is hypertensive.\par #1 CAD- s/p CABG, euvolemic, no angina or HF\par #2 Htn-  coreg 3.125mg for now, and amlodipine 10mg, restart telmesartan 20mg (was 80) for now, son monitors closely\par #3 ESRD- on dialysis,  There are no cardiac contraindications to proceeding with renal transplant\par #4 DM- on insulin, good control\par #5 Afib- on eliquis, no bleeding\par #6 Neuro- on keppra\par \par

## 2020-10-01 NOTE — HISTORY OF PRESENT ILLNESS
[FreeTextEntry1] : Srinivasan is continuing to improve and blood pressure rising as we re add his medications.

## 2020-10-01 NOTE — PHYSICAL EXAM
[General Appearance - Well Developed] : well developed [Normal Appearance] : normal appearance [Well Groomed] : well groomed [General Appearance - Well Nourished] : well nourished [No Deformities] : no deformities [General Appearance - In No Acute Distress] : no acute distress [Normal Conjunctiva] : the conjunctiva exhibited no abnormalities [Eyelids - No Xanthelasma] : the eyelids demonstrated no xanthelasmas [Normal Oral Mucosa] : normal oral mucosa [No Oral Pallor] : no oral pallor [No Oral Cyanosis] : no oral cyanosis [Normal Jugular Venous A Waves Present] : normal jugular venous A waves present [Normal Jugular Venous V Waves Present] : normal jugular venous V waves present [No Jugular Venous Rodriguez A Waves] : no jugular venous rodriguez A waves [Respiration, Rhythm And Depth] : normal respiratory rhythm and effort [Exaggerated Use Of Accessory Muscles For Inspiration] : no accessory muscle use [Auscultation Breath Sounds / Voice Sounds] : lungs were clear to auscultation bilaterally [Heart Rate And Rhythm] : heart rate and rhythm were normal [Heart Sounds] : normal S1 and S2 [Murmurs] : no murmurs present [Abdomen Soft] : soft [Abdomen Tenderness] : non-tender [Abdomen Mass (___ Cm)] : no abdominal mass palpated [Abnormal Walk] : normal gait [Gait - Sufficient For Exercise Testing] : the gait was sufficient for exercise testing [Nail Clubbing] : no clubbing of the fingernails [Cyanosis, Localized] : no localized cyanosis [Petechial Hemorrhages (___cm)] : no petechial hemorrhages [Skin Color & Pigmentation] : normal skin color and pigmentation [] : no rash [No Venous Stasis] : no venous stasis [Skin Lesions] : no skin lesions [No Skin Ulcers] : no skin ulcer [No Xanthoma] : no  xanthoma was observed [Oriented To Time, Place, And Person] : oriented to person, place, and time [Affect] : the affect was normal [Mood] : the mood was normal [No Anxiety] : not feeling anxious [FreeTextEntry1] : midsternal wound healing well

## 2020-10-07 ENCOUNTER — APPOINTMENT (OUTPATIENT)
Dept: INFECTIOUS DISEASE | Facility: CLINIC | Age: 65
End: 2020-10-07
Payer: MEDICARE

## 2020-10-07 VITALS
SYSTOLIC BLOOD PRESSURE: 173 MMHG | TEMPERATURE: 97 F | DIASTOLIC BLOOD PRESSURE: 82 MMHG | HEIGHT: 70 IN | HEART RATE: 62 BPM

## 2020-10-07 PROCEDURE — 99214 OFFICE O/P EST MOD 30 MIN: CPT

## 2020-10-07 NOTE — ASSESSMENT
[FreeTextEntry1] : 64 M ESRD on HD MWF, CAD s/p stents, IDDMII, hospital course in December 2019 surrounding CVA c/b status epilepticus requiring intubation and PEG placement s/p removal who presented to ID office for followup. \par \par Admitted 8/7/20 - 8/19/20 for CABG. \par Noted to have outpatient dialysis center PPD which was positive. \par In House Quantiferon Negative\par CT Chest (8/8) with perifissural opacties (stable from prior). \par Patient had 3x sputum samples obtained for AFB which were smear negative. \par AFB sputum cultures with no growth at 6 weeks. \par \par Patient's nephew had TB and patient from endemic area ([place]) \par While Quantiferon negative given positive PPD and given pulmonary nodules I would treat for latent TB\par Will check CBC with diff and CMP prior to initiation \par \par #Latent TB\par --Check CBC and CMP\par --Plan to start INH/B6 x 9 months if CBC and CMP are WNL\par \par #Pre-Renal Transplant, Positive Strongyloides Antibody\par --No absolute ID contraindication for renal transplant - plan to initiate INH/B6 ASAP\par --Strongyloides antibody positive - s/p Ivermectin \par \par #Encounter to Vaccinate patient\par --Appears to be UTD for pre-transplant vaccines\par --Received Influenza vaccine 1 week ago\par \par Followup: 1 month

## 2020-10-07 NOTE — HISTORY OF PRESENT ILLNESS
[FreeTextEntry1] : 64 M ESRD on HD MWF, CAD s/p stents, IDDMII, hospital course in December 2019 surrounding CVA c/b status epilepticus requiring intubation and PEG placement s/p removal who presented to ID office for followup. Of note, admitted 8/7/20 - 8/19/20 for CABG. Noted to have outpatient dialysis center PPD which was positive. CT Chest (8/8) with perifissural opacties (stable from prior). Patient had 3x sputum samples obtained for AFB which were smear negative. AFB sputum cultures with no growth at 6 weeks. Patient denies chills or fevers. Denies cough or SOB. Denies weight loss.

## 2020-10-07 NOTE — PHYSICAL EXAM
[General Appearance - Alert] : alert [General Appearance - In No Acute Distress] : in no acute distress [Sclera] : the sclera and conjunctiva were normal [PERRL With Normal Accommodation] : pupils were equal in size, round, reactive to light [Extraocular Movements] : extraocular movements were intact [Outer Ear] : the ears and nose were normal in appearance [Oropharynx] : the oropharynx was normal with no thrush [Auscultation Breath Sounds / Voice Sounds] : lungs were clear to auscultation bilaterally [Heart Rate And Rhythm] : heart rate was normal and rhythm regular [Heart Sounds] : normal S1 and S2 [Heart Sounds Gallop] : no gallops [Murmurs] : no murmurs [Heart Sounds Pericardial Friction Rub] : no pericardial rub [Edema] : there was no peripheral edema [Bowel Sounds] : normal bowel sounds [Abdomen Soft] : soft [Abdomen Tenderness] : non-tender [Abdomen Mass (___ Cm)] : no abdominal mass palpated [No Palpable Adenopathy] : no palpable adenopathy [Musculoskeletal - Swelling] : no joint swelling [Nail Clubbing] : no clubbing  or cyanosis of the fingernails [Motor Tone] : muscle strength and tone were normal [Skin Color & Pigmentation] : normal skin color and pigmentation [] : no rash [No Focal Deficits] : no focal deficits [Affect] : the affect was normal

## 2020-10-08 ENCOUNTER — APPOINTMENT (OUTPATIENT)
Dept: ENDOCRINOLOGY | Facility: CLINIC | Age: 65
End: 2020-10-08
Payer: MEDICARE

## 2020-10-08 ENCOUNTER — RESULT CHARGE (OUTPATIENT)
Age: 65
End: 2020-10-08

## 2020-10-08 VITALS — BODY MASS INDEX: 19.72 KG/M2 | WEIGHT: 137.4 LBS

## 2020-10-08 LAB — GLUCOSE BLDC GLUCOMTR-MCNC: 136

## 2020-10-08 PROCEDURE — G0108 DIAB MANAGE TRN  PER INDIV: CPT

## 2020-10-14 LAB
ALBUMIN SERPL ELPH-MCNC: 4.9 G/DL
ALP BLD-CCNC: 135 U/L
ALT SERPL-CCNC: 11 U/L
ANION GAP SERPL CALC-SCNC: 19 MMOL/L
AST SERPL-CCNC: 18 U/L
BASOPHILS # BLD AUTO: 0.03 K/UL
BASOPHILS NFR BLD AUTO: 0.5 %
BILIRUB SERPL-MCNC: 0.4 MG/DL
BUN SERPL-MCNC: 80 MG/DL
CALCIUM SERPL-MCNC: 10.4 MG/DL
CHLORIDE SERPL-SCNC: 94 MMOL/L
CO2 SERPL-SCNC: 25 MMOL/L
CREAT SERPL-MCNC: 7.74 MG/DL
EOSINOPHIL # BLD AUTO: 0.25 K/UL
EOSINOPHIL NFR BLD AUTO: 3.9 %
GLUCOSE SERPL-MCNC: 107 MG/DL
HCT VFR BLD CALC: 46.2 %
HGB BLD-MCNC: 13.3 G/DL
IMM GRANULOCYTES NFR BLD AUTO: 0.3 %
LYMPHOCYTES # BLD AUTO: 1.87 K/UL
LYMPHOCYTES NFR BLD AUTO: 29.3 %
MAN DIFF?: NORMAL
MCHC RBC-ENTMCNC: 28.8 GM/DL
MCHC RBC-ENTMCNC: 29.2 PG
MCV RBC AUTO: 101.5 FL
MONOCYTES # BLD AUTO: 0.42 K/UL
MONOCYTES NFR BLD AUTO: 6.6 %
NEUTROPHILS # BLD AUTO: 3.8 K/UL
NEUTROPHILS NFR BLD AUTO: 59.4 %
PLATELET # BLD AUTO: 332 K/UL
POTASSIUM SERPL-SCNC: 7.2 MMOL/L
PROT SERPL-MCNC: 8.9 G/DL
RBC # BLD: 4.55 M/UL
RBC # FLD: 17.9 %
SARS-COV-2 IGG SERPL IA-ACNC: >400 AU/ML
SARS-COV-2 IGG SERPL QL IA: POSITIVE
SODIUM SERPL-SCNC: 138 MMOL/L
WBC # FLD AUTO: 6.39 K/UL

## 2020-10-19 NOTE — PROCEDURE NOTE - NSNUMOFLUMENS_VASC_A_CORE
From: Edyta Vann  To: Jenny Martinez MD  Sent: 10/16/2020 10:49 AM CDT  Subject: Chandrika Mleton, may I please have a prescription refill on my vyvanse. Thank you and stay safe.      Germán Faith
double lumen
double lumen

## 2020-10-26 ENCOUNTER — RX RENEWAL (OUTPATIENT)
Age: 65
End: 2020-10-26

## 2020-10-29 ENCOUNTER — APPOINTMENT (OUTPATIENT)
Dept: CARDIOLOGY | Facility: CLINIC | Age: 65
End: 2020-10-29
Payer: MEDICARE

## 2020-10-29 ENCOUNTER — NON-APPOINTMENT (OUTPATIENT)
Age: 65
End: 2020-10-29

## 2020-10-29 VITALS
TEMPERATURE: 96.4 F | SYSTOLIC BLOOD PRESSURE: 155 MMHG | WEIGHT: 136.68 LBS | HEART RATE: 84 BPM | BODY MASS INDEX: 19.57 KG/M2 | OXYGEN SATURATION: 100 % | HEIGHT: 70 IN | DIASTOLIC BLOOD PRESSURE: 74 MMHG

## 2020-10-29 DIAGNOSIS — Z86.39 PERSONAL HISTORY OF OTHER ENDOCRINE, NUTRITIONAL AND METABOLIC DISEASE: ICD-10-CM

## 2020-10-29 DIAGNOSIS — Z86.73 PERSONAL HISTORY OF TRANSIENT ISCHEMIC ATTACK (TIA), AND CEREBRAL INFARCTION W/OUT RESIDUAL DEFICITS: ICD-10-CM

## 2020-10-29 PROCEDURE — 99215 OFFICE O/P EST HI 40 MIN: CPT

## 2020-10-29 PROCEDURE — 93000 ELECTROCARDIOGRAM COMPLETE: CPT

## 2020-10-29 NOTE — DISCUSSION/SUMMARY
[Paroxysmal Atrial Fibrillation] : paroxysmal atrial fibrillation [Compensated] : compensated [Coronary Artery Disease] : coronary artery disease [Stable] : stable [Hypertension] : hypertension [FreeTextEntry1] : \par Currently stable from a cardiovascular standpoint. Hypertensive today. Appears euvolemic. Stable CAD (s/p CABG x3 vessels in August 2020) with preserved LV systolic function. No ischemic or CHF symptoms. History of atrial fibrillation. Currently in sinus rhythm. Recommended reducing Eliquis to 2.5 mg twice daily given dialysis status. Continue all other current medications. ECG completed today and reviewed. At this time, patient is considered an acceptable risk from a cardiac standpoint for renal transplant. Regular follow up with primary cardiologist is advised.

## 2020-10-29 NOTE — HISTORY OF PRESENT ILLNESS
[FreeTextEntry1] : Doing okay. Denies chest pain, shortness of breath or palpitations. Had CABG back in August.

## 2020-11-02 ENCOUNTER — INPATIENT (INPATIENT)
Facility: HOSPITAL | Age: 65
LOS: 1 days | Discharge: HOME | DRG: 308 | End: 2020-11-04
Attending: INTERNAL MEDICINE | Admitting: INTERNAL MEDICINE
Payer: MEDICARE

## 2020-11-02 VITALS
WEIGHT: 141.1 LBS | RESPIRATION RATE: 20 BRPM | HEIGHT: 70 IN | SYSTOLIC BLOOD PRESSURE: 164 MMHG | TEMPERATURE: 99 F | OXYGEN SATURATION: 100 % | HEART RATE: 133 BPM | DIASTOLIC BLOOD PRESSURE: 101 MMHG

## 2020-11-02 DIAGNOSIS — Z95.1 PRESENCE OF AORTOCORONARY BYPASS GRAFT: Chronic | ICD-10-CM

## 2020-11-02 DIAGNOSIS — I48.91 UNSPECIFIED ATRIAL FIBRILLATION: ICD-10-CM

## 2020-11-02 LAB
ALBUMIN SERPL ELPH-MCNC: 4.5 G/DL — SIGNIFICANT CHANGE UP (ref 3.3–5)
ALP SERPL-CCNC: 104 U/L — SIGNIFICANT CHANGE UP (ref 40–120)
ALT FLD-CCNC: 14 U/L — SIGNIFICANT CHANGE UP (ref 10–45)
ANION GAP SERPL CALC-SCNC: 14 MMOL/L — SIGNIFICANT CHANGE UP (ref 5–17)
ANION GAP SERPL CALC-SCNC: 16 MMOL/L — SIGNIFICANT CHANGE UP (ref 5–17)
AST SERPL-CCNC: 79 U/L — HIGH (ref 10–40)
BASE EXCESS BLDV CALC-SCNC: 4.5 MMOL/L — HIGH (ref -2–2)
BASOPHILS # BLD AUTO: 0.03 K/UL — SIGNIFICANT CHANGE UP (ref 0–0.2)
BASOPHILS NFR BLD AUTO: 0.4 % — SIGNIFICANT CHANGE UP (ref 0–2)
BILIRUB SERPL-MCNC: 0.5 MG/DL — SIGNIFICANT CHANGE UP (ref 0.2–1.2)
BUN SERPL-MCNC: 35 MG/DL — HIGH (ref 7–23)
BUN SERPL-MCNC: 36 MG/DL — HIGH (ref 7–23)
CA-I SERPL-SCNC: 1.18 MMOL/L — SIGNIFICANT CHANGE UP (ref 1.12–1.3)
CALCIUM SERPL-MCNC: 10.1 MG/DL — SIGNIFICANT CHANGE UP (ref 8.4–10.5)
CALCIUM SERPL-MCNC: 10.5 MG/DL — SIGNIFICANT CHANGE UP (ref 8.4–10.5)
CHLORIDE BLDV-SCNC: 96 MMOL/L — SIGNIFICANT CHANGE UP (ref 96–108)
CHLORIDE SERPL-SCNC: 91 MMOL/L — LOW (ref 96–108)
CHLORIDE SERPL-SCNC: 93 MMOL/L — LOW (ref 96–108)
CO2 BLDV-SCNC: 31 MMOL/L — HIGH (ref 22–30)
CO2 SERPL-SCNC: 24 MMOL/L — SIGNIFICANT CHANGE UP (ref 22–31)
CO2 SERPL-SCNC: 25 MMOL/L — SIGNIFICANT CHANGE UP (ref 22–31)
CREAT SERPL-MCNC: 4.19 MG/DL — HIGH (ref 0.5–1.3)
CREAT SERPL-MCNC: 4.43 MG/DL — HIGH (ref 0.5–1.3)
EOSINOPHIL # BLD AUTO: 0.41 K/UL — SIGNIFICANT CHANGE UP (ref 0–0.5)
EOSINOPHIL NFR BLD AUTO: 5.7 % — SIGNIFICANT CHANGE UP (ref 0–6)
GAS PNL BLDV: 136 MMOL/L — SIGNIFICANT CHANGE UP (ref 135–145)
GAS PNL BLDV: SIGNIFICANT CHANGE UP
GAS PNL BLDV: SIGNIFICANT CHANGE UP
GLUCOSE BLDV-MCNC: 180 MG/DL — HIGH (ref 70–99)
GLUCOSE SERPL-MCNC: 164 MG/DL — HIGH (ref 70–99)
GLUCOSE SERPL-MCNC: 174 MG/DL — HIGH (ref 70–99)
HCO3 BLDV-SCNC: 30 MMOL/L — HIGH (ref 21–29)
HCT VFR BLD CALC: 48.1 % — SIGNIFICANT CHANGE UP (ref 39–50)
HCT VFR BLDA CALC: 48 % — SIGNIFICANT CHANGE UP (ref 39–50)
HGB BLD CALC-MCNC: 15.6 G/DL — SIGNIFICANT CHANGE UP (ref 13–17)
HGB BLD-MCNC: 15.1 G/DL — SIGNIFICANT CHANGE UP (ref 13–17)
IMM GRANULOCYTES NFR BLD AUTO: 0.4 % — SIGNIFICANT CHANGE UP (ref 0–1.5)
LACTATE BLDV-MCNC: 1.7 MMOL/L — SIGNIFICANT CHANGE UP (ref 0.7–2)
LYMPHOCYTES # BLD AUTO: 1.06 K/UL — SIGNIFICANT CHANGE UP (ref 1–3.3)
LYMPHOCYTES # BLD AUTO: 14.7 % — SIGNIFICANT CHANGE UP (ref 13–44)
MAGNESIUM SERPL-MCNC: 2.2 MG/DL — SIGNIFICANT CHANGE UP (ref 1.6–2.6)
MCHC RBC-ENTMCNC: 28.5 PG — SIGNIFICANT CHANGE UP (ref 27–34)
MCHC RBC-ENTMCNC: 31.4 GM/DL — LOW (ref 32–36)
MCV RBC AUTO: 90.9 FL — SIGNIFICANT CHANGE UP (ref 80–100)
MONOCYTES # BLD AUTO: 0.5 K/UL — SIGNIFICANT CHANGE UP (ref 0–0.9)
MONOCYTES NFR BLD AUTO: 6.9 % — SIGNIFICANT CHANGE UP (ref 2–14)
NEUTROPHILS # BLD AUTO: 5.18 K/UL — SIGNIFICANT CHANGE UP (ref 1.8–7.4)
NEUTROPHILS NFR BLD AUTO: 71.9 % — SIGNIFICANT CHANGE UP (ref 43–77)
NRBC # BLD: 0 /100 WBCS — SIGNIFICANT CHANGE UP (ref 0–0)
PCO2 BLDV: 47 MMHG — SIGNIFICANT CHANGE UP (ref 35–50)
PH BLDV: 7.42 — SIGNIFICANT CHANGE UP (ref 7.35–7.45)
PHOSPHATE SERPL-MCNC: 3.2 MG/DL — SIGNIFICANT CHANGE UP (ref 2.5–4.5)
PLATELET # BLD AUTO: 243 K/UL — SIGNIFICANT CHANGE UP (ref 150–400)
PO2 BLDV: 28 MMHG — SIGNIFICANT CHANGE UP (ref 25–45)
POTASSIUM BLDV-SCNC: 5 MMOL/L — SIGNIFICANT CHANGE UP (ref 3.5–5.3)
POTASSIUM SERPL-MCNC: 5.2 MMOL/L — SIGNIFICANT CHANGE UP (ref 3.5–5.3)
POTASSIUM SERPL-MCNC: 8 MMOL/L — CRITICAL HIGH (ref 3.5–5.3)
POTASSIUM SERPL-SCNC: 5.2 MMOL/L — SIGNIFICANT CHANGE UP (ref 3.5–5.3)
POTASSIUM SERPL-SCNC: 8 MMOL/L — CRITICAL HIGH (ref 3.5–5.3)
PROT SERPL-MCNC: 9.3 G/DL — HIGH (ref 6–8.3)
RBC # BLD: 5.29 M/UL — SIGNIFICANT CHANGE UP (ref 4.2–5.8)
RBC # FLD: 15.9 % — HIGH (ref 10.3–14.5)
SAO2 % BLDV: 42 % — LOW (ref 67–88)
SODIUM SERPL-SCNC: 130 MMOL/L — LOW (ref 135–145)
SODIUM SERPL-SCNC: 133 MMOL/L — LOW (ref 135–145)
TROPONIN T, HIGH SENSITIVITY RESULT: 95 NG/L — HIGH (ref 0–51)
WBC # BLD: 7.21 K/UL — SIGNIFICANT CHANGE UP (ref 3.8–10.5)
WBC # FLD AUTO: 7.21 K/UL — SIGNIFICANT CHANGE UP (ref 3.8–10.5)

## 2020-11-02 PROCEDURE — 93010 ELECTROCARDIOGRAM REPORT: CPT | Mod: 76,GC

## 2020-11-02 PROCEDURE — 71045 X-RAY EXAM CHEST 1 VIEW: CPT | Mod: 26

## 2020-11-02 PROCEDURE — 99291 CRITICAL CARE FIRST HOUR: CPT | Mod: CS,GC

## 2020-11-02 RX ORDER — METOPROLOL TARTRATE 50 MG
50 TABLET ORAL ONCE
Refills: 0 | Status: COMPLETED | OUTPATIENT
Start: 2020-11-02 | End: 2020-11-02

## 2020-11-02 RX ORDER — LEVETIRACETAM 250 MG/1
250 TABLET, FILM COATED ORAL ONCE
Refills: 0 | Status: COMPLETED | OUTPATIENT
Start: 2020-11-02 | End: 2020-11-02

## 2020-11-02 RX ORDER — SODIUM ZIRCONIUM CYCLOSILICATE 10 G/10G
5 POWDER, FOR SUSPENSION ORAL ONCE
Refills: 0 | Status: COMPLETED | OUTPATIENT
Start: 2020-11-02 | End: 2020-11-02

## 2020-11-02 RX ORDER — CALCIUM GLUCONATE 100 MG/ML
1 VIAL (ML) INTRAVENOUS ONCE
Refills: 0 | Status: COMPLETED | OUTPATIENT
Start: 2020-11-02 | End: 2020-11-02

## 2020-11-02 RX ORDER — SODIUM CHLORIDE 9 MG/ML
500 INJECTION, SOLUTION INTRAVENOUS ONCE
Refills: 0 | Status: COMPLETED | OUTPATIENT
Start: 2020-11-02 | End: 2020-11-02

## 2020-11-02 RX ADMIN — SODIUM CHLORIDE 500 MILLILITER(S): 9 INJECTION, SOLUTION INTRAVENOUS at 21:44

## 2020-11-02 RX ADMIN — LEVETIRACETAM 250 MILLIGRAM(S): 250 TABLET, FILM COATED ORAL at 22:31

## 2020-11-02 RX ADMIN — SODIUM ZIRCONIUM CYCLOSILICATE 5 GRAM(S): 10 POWDER, FOR SUSPENSION ORAL at 22:52

## 2020-11-02 RX ADMIN — SODIUM CHLORIDE 500 MILLILITER(S): 9 INJECTION, SOLUTION INTRAVENOUS at 22:52

## 2020-11-02 RX ADMIN — Medication 50 MILLIGRAM(S): at 22:31

## 2020-11-02 RX ADMIN — Medication 1 GRAM(S): at 22:52

## 2020-11-02 RX ADMIN — Medication 100 GRAM(S): at 21:05

## 2020-11-02 NOTE — ED PROVIDER NOTE - PROGRESS NOTE DETAILS
Alejandra Welsh, resident MD: spoke with Dr. Barrientos (nephrology) and will admit for further management. Pt's PCP is aware of admission. consulted cardiology for EKG changes. Teri JACKSON: cards consulted w c/f ekg changes possible large discordance anteriorly, pt however w/o cp, sob, comfortable appearing HR improving after home afib PO meds.

## 2020-11-02 NOTE — ED ADULT TRIAGE NOTE - CHIEF COMPLAINT QUOTE
Patient sent here after dialysis for elevated HR. Lab work shows elevated potassium. Dialysis via permicath MWF. Denies CP or palpitations.   PMHx afib, HTN, DM, ESRD, seizure disorder

## 2020-11-02 NOTE — ED PROVIDER NOTE - OBJECTIVE STATEMENT
66yo man PMH HTN, HLD, DM on insulin, hypothyroidism, CAD s/p stent, afib on eliquis, ESRD on HD MWF was sent from dialysis ([facility]) for hyperkalemia and tachycardia post dialysis today. Pt denies chest pain, SOB, n/v/d, abdominal pain. No cough. Pt still makes a little urine and denies any dysuria.

## 2020-11-02 NOTE — ED PROVIDER NOTE - NS ED ROS FT
General: no fever  Head: no headache  Eyes: no vision change  ENT: no nasal discharge/congestion, no sore throat  CV: no chest pain or palpitations  Resp: no SOB, no cough  GI: no N/V, no abdominal pain  : no dysuria  MSK: no joint pain  Skin: no new rash  Neuro: no focal weakness, no change in sensation

## 2020-11-02 NOTE — ED ADULT NURSE NOTE - OBJECTIVE STATEMENT
66 y/o M A&Ox3 PMH ESRD presents to ED ambulatory from dialysis for increased potassium levels. Patient states that he receives hemodialysis 3x per week. Dialysis port noted on left side of chest wall, dressing clean, dry, intact. Endorses tingling in right hand. Lung sounds clear b/l, S1 S2 noted on auscultation. +ROM x4 extremities. Denies CP, SOB, n/v/d. Bed locked and in lowest position, call bell within reach. 66 y/o M A&Ox3 with comprehensive medical hx presents to ED ambulatory from dialysis for increased potassium levels. Patient states that he receives hemodialysis 3x per week. Dialysis port noted on left side of chest wall, dressing clean, dry, intact. Endorses tingling in right hand, states this is residual from prior stroke. +ROM x4 extremities, +strong hand  b/l. Lung sounds clear b/l, S1 S2 noted on auscultation.  Denies dizziness, blurred vision, chest pain, SOB, n/v/d. Bed locked and in lowest position, call bell within reach. 64 y/o M A&Ox3 with hx of seizure, Afib, ESRD, dialysis MWF and CVA hx presents to ED from outpatient dialysis center for increased potassium levels and elevated HR s/p dialysis tx. Patient states that he receives dialysis via dialysis port on left side of chest wall, dressing clean, dry, intact. Endorses tingling in right hand, which he states is residual from prior stroke, +strong hand  b/l, +ROMx4 extremities. Denies any tingling, weakness, dizziness, HA or blurry vision. Lung sounds clear b/l, non labored respirations, pulse ox 97% on room air. Patient placed on cardiac monitor, HR 120s, S1 S2 noted on auscultation. Denies fevers, chest pain, SOB, n/v/d. Bed locked and in lowest position, call bell within reach.

## 2020-11-02 NOTE — ED PROVIDER NOTE - PHYSICAL EXAMINATION
General: well-appearing, no acute distress  Head: normocephalic, atraumatic  Eyes: PERRL   Mouth: moist mucous membranes  Neck: supple neck  CV: tachycardic and irregular, no LE edema, peripheral pulses 2+ bilaterally  Respiratory: clear to auscultation bilaterally  Abdomen: soft, nontender, nondistended  : no suprapubic tenderness  Neuro: alert and oriented x3, speech clear, moving all extremities  Skin: no rash noted, left chest dialysis catheter in place  Extremities: no joint deformities

## 2020-11-02 NOTE — ED ADULT TRIAGE NOTE - LANGUAGE ASSISTANCE NEEDED
No-Patient/Caregiver offered and refused free interpretation services./pt prefers to have son assist with translation in triage

## 2020-11-02 NOTE — ED PROVIDER NOTE - CLINICAL SUMMARY MEDICAL DECISION MAKING FREE TEXT BOX
64yo man ESRD on HD was sent for evaluation of tachycardia and hyperkalemia after dialysis but asymptomatic on exam. Will recheck blood work. With concerning EKG changes will start on calcium and repeat ekg. Will evaluate for cardiac etiology with trop and send TSH for evaluation of tachycardia. Will keep on cardiac monitor and likely require admission.

## 2020-11-02 NOTE — ED PROVIDER NOTE - ATTENDING CONTRIBUTION TO CARE
I have personally performed a face to face medical and diagnostic evaluation of the patient. I have discussed with and reviewed the Resident's note and agree with the History, ROS, Physical Exam and MDM unless otherwise indicated. A brief summary of my personal evaluation and impression can be found below.    65M hx of ESRD HD MWF had HD today, Afib on Lokaylyn Eliquis presents with a cc of c/f hyperkalemia and fast HR was told after HD today to present w c/f high K on repeat lab works prompting visit. Otherwise is well w/o complaints. No chest pain no SOB no LE swelling. No palpitations. Denies n/v/f/c/cp/sob. Denies headache, syncope, lightheadedness, dizziness. Denies chest palpitations, abdominal pain. Denies edema. Denies dysuria, hematuria, BRBPR, tarry stools, diarrhea, constipation. Still able to make small amounts of urine.     All other ROS negative, except as above and as per HPI and ROS section.    VITALS: Initial triage and subsequent vitals have been reviewed by me.  GEN APPEARANCE: WDWN, alert, non-toxic, NAD  HEAD: Atraumatic.  EYES: PERRLa, EOMI, vision grossly intact.   NECK: Supple  CV: RRR, S1S2, no c/r/m/g. Cap refill <2 seconds. No bruits.   LUNGS: CTAB. No abnormal breath sounds.  ABDOMEN: Soft, NTND. No guarding or rebound.   MSK/EXT: No spinal or extremity point tenderness. No CVA ttp. Pelvis stable. No peripheral edema.  NEURO: Alert, follows commands. Weight bearing normal. Speech normal. Sensation and motor normal x4 extremities.   SKIN: Warm, dry and intact. No rash.  PSYCH: Appropriate    Plan/MDM: 65M hx of ESRD HD MWF had HD today presents with a cc of tachycardia and hyper K after HD today, exam HR 130s' afib? w discordance on EKG however w/o chest pain, sob, no anginal sx, comfortable appearing, non focal exam, BP wnl, ddx c/f e lyte abnormality, vs infectious etiology vs hypovolemia 2/2 HD, will check labs cxr cardiacs, give home afib meds, discuss with nephrologist, cardiology admit thereafter.

## 2020-11-03 DIAGNOSIS — I25.10 ATHEROSCLEROTIC HEART DISEASE OF NATIVE CORONARY ARTERY WITHOUT ANGINA PECTORIS: ICD-10-CM

## 2020-11-03 DIAGNOSIS — E11.9 TYPE 2 DIABETES MELLITUS WITHOUT COMPLICATIONS: ICD-10-CM

## 2020-11-03 DIAGNOSIS — Z22.7 LATENT TUBERCULOSIS: ICD-10-CM

## 2020-11-03 DIAGNOSIS — E03.9 HYPOTHYROIDISM, UNSPECIFIED: ICD-10-CM

## 2020-11-03 DIAGNOSIS — I48.0 PAROXYSMAL ATRIAL FIBRILLATION: ICD-10-CM

## 2020-11-03 DIAGNOSIS — I48.3 TYPICAL ATRIAL FLUTTER: ICD-10-CM

## 2020-11-03 DIAGNOSIS — I10 ESSENTIAL (PRIMARY) HYPERTENSION: ICD-10-CM

## 2020-11-03 DIAGNOSIS — N18.6 END STAGE RENAL DISEASE: ICD-10-CM

## 2020-11-03 LAB
ALBUMIN SERPL ELPH-MCNC: 3.5 G/DL — SIGNIFICANT CHANGE UP (ref 3.3–5)
ALP SERPL-CCNC: 89 U/L — SIGNIFICANT CHANGE UP (ref 40–120)
ALT FLD-CCNC: 7 U/L — LOW (ref 10–45)
ANION GAP SERPL CALC-SCNC: 14 MMOL/L — SIGNIFICANT CHANGE UP (ref 5–17)
APPEARANCE UR: CLEAR — SIGNIFICANT CHANGE UP
AST SERPL-CCNC: 15 U/L — SIGNIFICANT CHANGE UP (ref 10–40)
BACTERIA # UR AUTO: NEGATIVE — SIGNIFICANT CHANGE UP
BILIRUB SERPL-MCNC: 0.3 MG/DL — SIGNIFICANT CHANGE UP (ref 0.2–1.2)
BILIRUB UR-MCNC: NEGATIVE — SIGNIFICANT CHANGE UP
BUN SERPL-MCNC: 44 MG/DL — HIGH (ref 7–23)
CALCIUM SERPL-MCNC: 9.5 MG/DL — SIGNIFICANT CHANGE UP (ref 8.4–10.5)
CHLORIDE SERPL-SCNC: 94 MMOL/L — LOW (ref 96–108)
CHLORIDE UR-SCNC: <35 MMOL/L — SIGNIFICANT CHANGE UP
CO2 SERPL-SCNC: 25 MMOL/L — SIGNIFICANT CHANGE UP (ref 22–31)
COLOR SPEC: SIGNIFICANT CHANGE UP
CREAT SERPL-MCNC: 5.5 MG/DL — HIGH (ref 0.5–1.3)
DIFF PNL FLD: ABNORMAL
EPI CELLS # UR: 0 /HPF — SIGNIFICANT CHANGE UP
GLUCOSE BLDC GLUCOMTR-MCNC: 126 MG/DL — HIGH (ref 70–99)
GLUCOSE BLDC GLUCOMTR-MCNC: 207 MG/DL — HIGH (ref 70–99)
GLUCOSE BLDC GLUCOMTR-MCNC: 229 MG/DL — HIGH (ref 70–99)
GLUCOSE BLDC GLUCOMTR-MCNC: 249 MG/DL — HIGH (ref 70–99)
GLUCOSE BLDC GLUCOMTR-MCNC: 277 MG/DL — HIGH (ref 70–99)
GLUCOSE SERPL-MCNC: 251 MG/DL — HIGH (ref 70–99)
GLUCOSE UR QL: ABNORMAL
HCT VFR BLD CALC: 40.3 % — SIGNIFICANT CHANGE UP (ref 39–50)
HGB BLD-MCNC: 12.8 G/DL — LOW (ref 13–17)
HYALINE CASTS # UR AUTO: 1 /LPF — SIGNIFICANT CHANGE UP (ref 0–2)
KETONES UR-MCNC: NEGATIVE — SIGNIFICANT CHANGE UP
LEUKOCYTE ESTERASE UR-ACNC: NEGATIVE — SIGNIFICANT CHANGE UP
MCHC RBC-ENTMCNC: 29 PG — SIGNIFICANT CHANGE UP (ref 27–34)
MCHC RBC-ENTMCNC: 31.8 GM/DL — LOW (ref 32–36)
MCV RBC AUTO: 91.4 FL — SIGNIFICANT CHANGE UP (ref 80–100)
NITRITE UR-MCNC: NEGATIVE — SIGNIFICANT CHANGE UP
NRBC # BLD: 0 /100 WBCS — SIGNIFICANT CHANGE UP (ref 0–0)
PH UR: 8 — SIGNIFICANT CHANGE UP (ref 5–8)
PLATELET # BLD AUTO: 212 K/UL — SIGNIFICANT CHANGE UP (ref 150–400)
POTASSIUM SERPL-MCNC: 5 MMOL/L — SIGNIFICANT CHANGE UP (ref 3.5–5.3)
POTASSIUM SERPL-SCNC: 5 MMOL/L — SIGNIFICANT CHANGE UP (ref 3.5–5.3)
POTASSIUM UR-SCNC: 35 MMOL/L — SIGNIFICANT CHANGE UP
PROT SERPL-MCNC: 7.2 G/DL — SIGNIFICANT CHANGE UP (ref 6–8.3)
PROT UR-MCNC: ABNORMAL
RBC # BLD: 4.41 M/UL — SIGNIFICANT CHANGE UP (ref 4.2–5.8)
RBC # FLD: 15.8 % — HIGH (ref 10.3–14.5)
RBC CASTS # UR COMP ASSIST: 3 /HPF — SIGNIFICANT CHANGE UP (ref 0–4)
SARS-COV-2 IGG SERPL QL IA: POSITIVE
SARS-COV-2 IGM SERPL IA-ACNC: 329 AU/ML — HIGH
SARS-COV-2 RNA SPEC QL NAA+PROBE: SIGNIFICANT CHANGE UP
SODIUM SERPL-SCNC: 133 MMOL/L — LOW (ref 135–145)
SODIUM UR-SCNC: 61 MMOL/L — SIGNIFICANT CHANGE UP
SP GR SPEC: 1.01 — SIGNIFICANT CHANGE UP (ref 1.01–1.02)
TROPONIN T, HIGH SENSITIVITY RESULT: 89 NG/L — HIGH (ref 0–51)
TSH SERPL-MCNC: 0.54 UIU/ML — SIGNIFICANT CHANGE UP (ref 0.27–4.2)
UROBILINOGEN FLD QL: NEGATIVE — SIGNIFICANT CHANGE UP
WBC # BLD: 5.88 K/UL — SIGNIFICANT CHANGE UP (ref 3.8–10.5)
WBC # FLD AUTO: 5.88 K/UL — SIGNIFICANT CHANGE UP (ref 3.8–10.5)
WBC UR QL: 1 /HPF — SIGNIFICANT CHANGE UP (ref 0–5)

## 2020-11-03 PROCEDURE — 99223 1ST HOSP IP/OBS HIGH 75: CPT

## 2020-11-03 RX ORDER — DEXTROSE 50 % IN WATER 50 %
12.5 SYRINGE (ML) INTRAVENOUS ONCE
Refills: 0 | Status: DISCONTINUED | OUTPATIENT
Start: 2020-11-03 | End: 2020-11-04

## 2020-11-03 RX ORDER — GLUCAGON INJECTION, SOLUTION 0.5 MG/.1ML
1 INJECTION, SOLUTION SUBCUTANEOUS ONCE
Refills: 0 | Status: DISCONTINUED | OUTPATIENT
Start: 2020-11-03 | End: 2020-11-04

## 2020-11-03 RX ORDER — LEVOTHYROXINE SODIUM 125 MCG
88 TABLET ORAL DAILY
Refills: 0 | Status: DISCONTINUED | OUTPATIENT
Start: 2020-11-03 | End: 2020-11-04

## 2020-11-03 RX ORDER — HEPARIN SODIUM 5000 [USP'U]/ML
5000 INJECTION INTRAVENOUS; SUBCUTANEOUS EVERY 8 HOURS
Refills: 0 | Status: DISCONTINUED | OUTPATIENT
Start: 2020-11-03 | End: 2020-11-03

## 2020-11-03 RX ORDER — SODIUM CHLORIDE 9 MG/ML
1000 INJECTION, SOLUTION INTRAVENOUS
Refills: 0 | Status: DISCONTINUED | OUTPATIENT
Start: 2020-11-03 | End: 2020-11-04

## 2020-11-03 RX ORDER — INSULIN LISPRO 100/ML
3 VIAL (ML) SUBCUTANEOUS
Refills: 0 | Status: DISCONTINUED | OUTPATIENT
Start: 2020-11-03 | End: 2020-11-04

## 2020-11-03 RX ORDER — ACETAMINOPHEN 500 MG
650 TABLET ORAL EVERY 6 HOURS
Refills: 0 | Status: DISCONTINUED | OUTPATIENT
Start: 2020-11-03 | End: 2020-11-04

## 2020-11-03 RX ORDER — PEN NEEDLE, DIABETIC 29 G X1/2"
32G X 4 MM NEEDLE, DISPOSABLE MISCELLANEOUS
Qty: 1 | Refills: 1 | Status: ACTIVE | COMMUNITY
Start: 2020-09-08 | End: 1900-01-01

## 2020-11-03 RX ORDER — ATORVASTATIN CALCIUM 80 MG/1
80 TABLET, FILM COATED ORAL AT BEDTIME
Refills: 0 | Status: DISCONTINUED | OUTPATIENT
Start: 2020-11-03 | End: 2020-11-04

## 2020-11-03 RX ORDER — PYRIDOXINE HCL (VITAMIN B6) 100 MG
100 TABLET ORAL DAILY
Refills: 0 | Status: DISCONTINUED | OUTPATIENT
Start: 2020-11-03 | End: 2020-11-04

## 2020-11-03 RX ORDER — LEVETIRACETAM 250 MG/1
125 TABLET, FILM COATED ORAL
Refills: 0 | Status: DISCONTINUED | OUTPATIENT
Start: 2020-11-03 | End: 2020-11-04

## 2020-11-03 RX ORDER — ERYTHROPOIETIN 10000 [IU]/ML
0 INJECTION, SOLUTION INTRAVENOUS; SUBCUTANEOUS
Qty: 0 | Refills: 0 | DISCHARGE

## 2020-11-03 RX ORDER — LEVETIRACETAM 250 MG/1
250 TABLET, FILM COATED ORAL
Refills: 0 | Status: DISCONTINUED | OUTPATIENT
Start: 2020-11-03 | End: 2020-11-04

## 2020-11-03 RX ORDER — DEXTROSE 50 % IN WATER 50 %
25 SYRINGE (ML) INTRAVENOUS ONCE
Refills: 0 | Status: DISCONTINUED | OUTPATIENT
Start: 2020-11-03 | End: 2020-11-04

## 2020-11-03 RX ORDER — INSULIN LISPRO 100/ML
VIAL (ML) SUBCUTANEOUS
Refills: 0 | Status: DISCONTINUED | OUTPATIENT
Start: 2020-11-03 | End: 2020-11-04

## 2020-11-03 RX ORDER — LEVETIRACETAM 250 MG/1
250 TABLET, FILM COATED ORAL
Refills: 0 | Status: DISCONTINUED | OUTPATIENT
Start: 2020-11-03 | End: 2020-11-03

## 2020-11-03 RX ORDER — APIXABAN 2.5 MG/1
2.5 TABLET, FILM COATED ORAL
Refills: 0 | Status: DISCONTINUED | OUTPATIENT
Start: 2020-11-03 | End: 2020-11-04

## 2020-11-03 RX ORDER — ASPIRIN/CALCIUM CARB/MAGNESIUM 324 MG
81 TABLET ORAL DAILY
Refills: 0 | Status: DISCONTINUED | OUTPATIENT
Start: 2020-11-03 | End: 2020-11-04

## 2020-11-03 RX ORDER — HEXAVITAMINS
300 TABLET ORAL DAILY
Refills: 0 | Status: DISCONTINUED | OUTPATIENT
Start: 2020-11-03 | End: 2020-11-04

## 2020-11-03 RX ORDER — CARVEDILOL PHOSPHATE 80 MG/1
6.25 CAPSULE, EXTENDED RELEASE ORAL EVERY 12 HOURS
Refills: 0 | Status: DISCONTINUED | OUTPATIENT
Start: 2020-11-03 | End: 2020-11-04

## 2020-11-03 RX ORDER — DEXTROSE 50 % IN WATER 50 %
15 SYRINGE (ML) INTRAVENOUS ONCE
Refills: 0 | Status: DISCONTINUED | OUTPATIENT
Start: 2020-11-03 | End: 2020-11-04

## 2020-11-03 RX ORDER — INSULIN LISPRO 100/ML
VIAL (ML) SUBCUTANEOUS AT BEDTIME
Refills: 0 | Status: DISCONTINUED | OUTPATIENT
Start: 2020-11-03 | End: 2020-11-04

## 2020-11-03 RX ORDER — INSULIN LISPRO 100/ML
4 VIAL (ML) SUBCUTANEOUS
Refills: 0 | Status: DISCONTINUED | OUTPATIENT
Start: 2020-11-03 | End: 2020-11-04

## 2020-11-03 RX ADMIN — LEVETIRACETAM 250 MILLIGRAM(S): 250 TABLET, FILM COATED ORAL at 05:39

## 2020-11-03 RX ADMIN — Medication 81 MILLIGRAM(S): at 12:48

## 2020-11-03 RX ADMIN — Medication 3 UNIT(S): at 17:57

## 2020-11-03 RX ADMIN — APIXABAN 2.5 MILLIGRAM(S): 2.5 TABLET, FILM COATED ORAL at 17:57

## 2020-11-03 RX ADMIN — Medication 3: at 17:58

## 2020-11-03 RX ADMIN — Medication 88 MICROGRAM(S): at 05:39

## 2020-11-03 RX ADMIN — CARVEDILOL PHOSPHATE 6.25 MILLIGRAM(S): 80 CAPSULE, EXTENDED RELEASE ORAL at 17:57

## 2020-11-03 RX ADMIN — ATORVASTATIN CALCIUM 80 MILLIGRAM(S): 80 TABLET, FILM COATED ORAL at 22:24

## 2020-11-03 RX ADMIN — Medication 300 MILLIGRAM(S): at 12:39

## 2020-11-03 RX ADMIN — Medication 3 UNIT(S): at 09:13

## 2020-11-03 RX ADMIN — LEVETIRACETAM 250 MILLIGRAM(S): 250 TABLET, FILM COATED ORAL at 18:05

## 2020-11-03 RX ADMIN — Medication 2: at 09:14

## 2020-11-03 RX ADMIN — Medication 2: at 12:50

## 2020-11-03 RX ADMIN — Medication 4 UNIT(S): at 12:50

## 2020-11-03 RX ADMIN — CARVEDILOL PHOSPHATE 6.25 MILLIGRAM(S): 80 CAPSULE, EXTENDED RELEASE ORAL at 05:39

## 2020-11-03 RX ADMIN — Medication 100 MILLIGRAM(S): at 12:38

## 2020-11-03 RX ADMIN — APIXABAN 2.5 MILLIGRAM(S): 2.5 TABLET, FILM COATED ORAL at 05:39

## 2020-11-03 NOTE — H&P ADULT - NSHPPHYSICALEXAM_GEN_ALL_CORE
T(C): 36.9 (11-02-20 @ 23:54), Max: 37.6 (11-02-20 @ 20:56)  HR: 83 (11-02-20 @ 23:54) (83 - 133)  BP: 137/73 (11-02-20 @ 23:54) (137/73 - 164/102)  RR: 19 (11-02-20 @ 23:54) (18 - 22)  SpO2: 99% (11-02-20 @ 23:54) (96% - 100%)    Gen: (fe)male in NAD, appears comfortable, no diaphoresis  HEENT: NCAT, MMM, neck soft and supple  CV: +S1/S2, no m/r/g  Resp: CTAB, no w/r/r  GI: normoactive BS, soft, NTND, no rebounding/guarding  Ext: No LE edema, extremities appear warm and well perfused   Neuro: AOx3, no focal deficits, CNII-XII grossly intact  Psych: No SI/HI/AVH, appropriate affect  Skin: no petechiae, ecchymosis or maculopapular rash noted T(C): 36.9 (11-02-20 @ 23:54), Max: 37.6 (11-02-20 @ 20:56)  HR: 83 (11-02-20 @ 23:54) (83 - 133)  BP: 137/73 (11-02-20 @ 23:54) (137/73 - 164/102)  RR: 19 (11-02-20 @ 23:54) (18 - 22)  SpO2: 99% (11-02-20 @ 23:54) (96% - 100%)    Gen: male in NAD, appears comfortable, no diaphoresis  HEENT: NCAT, MMM, neck soft and supple  CV: +S1/S2, no m/r/g  Resp: CTAB, no w/r/r  GI: normoactive BS, soft, NTND, no rebounding/guarding  Ext: No LE edema, extremities appear warm and well perfused   Neuro: AOx3, no focal deficits, CNII-XII grossly intact  Psych: No SI/HI/AVH, appropriate affect  Skin: no petechiae, ecchymosis or maculopapular rash noted

## 2020-11-03 NOTE — H&P ADULT - HISTORY OF PRESENT ILLNESS
65M with PMHx of hypertension, ESRD, CAD (post-stents and CABG in August 2020). 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 Patient was at dialysis today went during dialysis they noted his HR was in the 130s. Patient was also feeling palpitations at that time. He denies shortness of breath or lightheadedness. Patient also tachycardic post-dialysis after they completed a session and was thus sent to ED for further evaluation. Patient denies lower extremity swelling, decrease exercise tolerance, or lightheadedness. While here patient noted to be in atrial flutter and given Metoprolol 50 mg once. In addition, given prior history of hyperkalemia patient also given  cc, Calcium, and Lokelma. Plan further discussed with son who is a cardiologist. Patient is chronically on Coreg 3.125 mg BID. He has no complaints currently. He does not feel palpitations currently.

## 2020-11-03 NOTE — CONSULT NOTE ADULT - SUBJECTIVE AND OBJECTIVE BOX
Patient seen and evaluated at bedside    Chief Complaint: afib     HPI:  65M with PMHx of hypertension, ESRD, CAD (post-stents and CABG in August 2020). 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 Patient was at dialysis today went during dialysis they noted his HR was in the 130s. Patient was also feeling palpitations at that time. He denies shortness of breath or lightheadedness. Patient also tachycardic post-dialysis after they completed a session and was thus sent to ED for further evaluation. Patient denies lower extremity swelling, decrease exercise tolerance, or lightheadedness. While here patient noted to be in atrial flutter and given Metoprolol 50 mg once. In addition, given prior history of hyperkalemia patient also given  cc, Calcium, and Lokelma. Plan further discussed with son who is a cardiologist. Patient is chronically on Coreg 3.125 mg BID.       PMHx:   ESRD on dialysis    Intubation of airway performed without difficulty    PEG (percutaneous endoscopic gastrostomy) status    Anemia    CVA (cerebral vascular accident)    Hypothyroidism    CRI (Chronic Renal Insufficiency)    CAD (Coronary Artery Disease)    CAD (Coronary Artery Disease)    Dyslipidemia    Diabetes    Hypertension        PSHx:   S/P CABG x 3    History of insertion of stent into coronary artery bypass graft    No significant past surgical history        Allergies:  No Known Allergies      Home Meds:    Current Medications:   acetaminophen   Tablet .. 650 milliGRAM(s) Oral every 6 hours PRN  apixaban 2.5 milliGRAM(s) Oral two times a day  aspirin enteric coated 81 milliGRAM(s) Oral daily  atorvastatin 80 milliGRAM(s) Oral at bedtime  carvedilol 6.25 milliGRAM(s) Oral every 12 hours  dextrose 40% Gel 15 Gram(s) Oral once PRN  dextrose 5%. 1000 milliLiter(s) IV Continuous <Continuous>  dextrose 50% Injectable 12.5 Gram(s) IV Push once  dextrose 50% Injectable 25 Gram(s) IV Push once  dextrose 50% Injectable 25 Gram(s) IV Push once  glucagon  Injectable 1 milliGRAM(s) IntraMuscular once PRN  insulin lispro (ADMELOG) corrective regimen sliding scale   SubCutaneous three times a day before meals  insulin lispro (ADMELOG) corrective regimen sliding scale   SubCutaneous at bedtime  insulin lispro Injectable (ADMELOG) 3 Unit(s) SubCutaneous before breakfast  insulin lispro Injectable (ADMELOG) 4 Unit(s) SubCutaneous before lunch  insulin lispro Injectable (ADMELOG) 3 Unit(s) SubCutaneous before dinner  isoniazid 300 milliGRAM(s) Oral daily  levETIRAcetam 250 milliGRAM(s) Oral two times a day  levETIRAcetam 125 milliGRAM(s) Oral <User Schedule>  levothyroxine 88 MICROGram(s) Oral daily  pyridoxine 100 milliGRAM(s) Oral daily      FAMILY HISTORY:  No pertinent family history in first degree relatives        Social History: Personally reviewed   No tobacco, EtOH or IVDU     REVIEW OF SYSTEMS:  Constitutional:     [x ] negative [ ] fevers [ ] chills [ ] weight loss [ ] weight gain  HEENT:                  [x ] negative [ ] dry eyes [ ] eye irritation [ ] postnasal drip [ ] nasal congestion  CV:                         [ x] negative  [ ] chest pain [ ] orthopnea [ ] palpitations [ ] murmur  Resp:                     [x ] negative [ ] cough [ ] shortness of breath [ ] dyspnea [ ] wheezing [ ] sputum [ ]hemoptysis  GI:                          [ x] negative [ ] nausea [ ] vomiting [ ] diarrhea [ ] constipation [ ] abd pain [ ] dysphagia   :                        [ x] negative [ ] dysuria [ ] nocturia [ ] hematuria [ ] increased urinary frequency  Musculoskeletal: [x ] negative [ ] back pain [ ] myalgias [ ] arthralgias [ ] fracture  Skin:                       [ x] negative [ ] rash [ ] itch  Neurological:        [ x] negative [ ] headache [ ] dizziness [ ] syncope [ ] weakness [ ] numbness  Psychiatric:           [ x] negative [ ] anxiety [ ] depression  Endocrine:            [ x] negative [ ] diabetes [ ] thyroid problem  Heme/Lymph:      [ x] negative [ ] anemia [ ] bleeding problem  Allergic/Immune: [ x] negative [ ] itchy eyes [ ] nasal discharge [ ] hives [ ] angioedema    [ x] All other systems negative  [ ] Unable to assess ROS due to      Physical Exam:  T(F): 98.5 (11-02), Max: 99.7 (11-02)  HR: 83 (11-02) (83 - 133)  BP: 137/73 (11-02) (137/73 - 164/102)  RR: 19 (11-02)  SpO2: 99% (11-02)  GENERAL: No acute distress, well-developed  HEAD:  Atraumatic, Normocephalic  ENT: EOMI, PERRLA, conjunctiva and sclera clear, Neck supple, No JVD, moist mucosa  CHEST/LUNG: Clear to auscultation bilaterally; No wheeze, equal breath sounds bilaterally   BACK: No spinal tenderness  HEART: Regular rate and rhythm; No murmurs, rubs, or gallops  ABDOMEN: Soft, Nontender, Nondistended; Bowel sounds present  EXTREMITIES:  No clubbing, cyanosis, or edema  PSYCH: Nl behavior, nl affect  NEUROLOGY: AAOx3, non-focal, cranial nerves intact  SKIN: Normal color, No rashes or lesions  LINES:    Cardiovascular Diagnostic Testing:    ECG: Personally reviewed    Echo: Personally reviewed    Stress Testing: Personally reviewed     Angiogram: Personally reviewed     Imaging:    CXR: Personally reviewed    Labs: Personally reviewed                        15.1   7.21  )-----------( 243      ( 02 Nov 2020 20:50 )             48.1     11-02    133<L>  |  93<L>  |  36<H>  ----------------------------<  164<H>  5.2   |  24  |  4.43<H>    Ca    10.5      02 Nov 2020 21:46  Phos  3.2     11-02  Mg     2.2     11-02    TPro  9.3<H>  /  Alb  4.5  /  TBili  0.5  /  DBili  x   /  AST  79<H>  /  ALT  14  /  AlkPhos  104  11-02

## 2020-11-03 NOTE — H&P ADULT - ASSESSMENT
65M with PMHx of hypertension, ESRD, CAD (post-stents and CABG in August 2020). 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 EKG reviewing atrial flutter.

## 2020-11-03 NOTE — CONSULT NOTE ADULT - SUBJECTIVE AND OBJECTIVE BOX
[facility]   MD NINA MASON MD RUORU WONG, PA    TEL:  OFFICE: [phone]  DR BENITEZ CELL: [phone]  RAS MORELOS CELL: [phone]  DR. MARQUEZ CELL: [phone]  DR. ALEX CELl: [phone]    FROM 5 PM- 7 AM PLEASE CALL ANSWERING SERVICE AT [phone]    -- INITIAL RENAL CONSULT NOTE --- Date Of service 11-03-20 @ 09:39  --------------------------------------------------------------------------------  HPI:    65M with PMHx of hypertension, ESRD, CAD (post-stents and CABG in August 2020). 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 hand weakness) and paroxysmal atrial fibrillation presents with one day history of palpitation  Nephroogy consulted for ESRD management  PT goes to [facility] on [facility]  nephrologist Dr benitez    PAST HISTORY  --------------------------------------------------------------------------------  PAST MEDICAL & SURGICAL HISTORY:  ESRD on dialysis  M/W/F    Intubation of airway performed without difficulty  Dec 2019  CVA c/b status epilepticus requiring intubation and PEG in 12/2019-    PEG (percutaneous endoscopic gastrostomy) status  removed July 2020    Anemia    CVA (cerebral vascular accident)  12/13/19 with residual bilateral weakness    Hypothyroidism    CRI (Chronic Renal Insufficiency)    CAD (Coronary Artery Disease)  with Stents in 06/2009 and 6/2019, s/p off-pump C3L on 8/13/20    Dyslipidemia    Diabetes    Hypertension    S/P CABG x 3  off pump C3L on 8/13/20    History of insertion of stent into coronary artery bypass graft  2009 and 2019      FAMILY HISTORY:  No pertinent family history in first degree relatives      PAST SOCIAL HISTORY:    ALLERGIES & MEDICATIONS  --------------------------------------------------------------------------------  Allergies    No Known Allergies    Intolerances      Standing Inpatient Medications  apixaban 2.5 milliGRAM(s) Oral two times a day  aspirin enteric coated 81 milliGRAM(s) Oral daily  atorvastatin 80 milliGRAM(s) Oral at bedtime  carvedilol 6.25 milliGRAM(s) Oral every 12 hours  dextrose 5%. 1000 milliLiter(s) IV Continuous <Continuous>  dextrose 50% Injectable 12.5 Gram(s) IV Push once  dextrose 50% Injectable 25 Gram(s) IV Push once  dextrose 50% Injectable 25 Gram(s) IV Push once  insulin lispro (ADMELOG) corrective regimen sliding scale   SubCutaneous three times a day before meals  insulin lispro (ADMELOG) corrective regimen sliding scale   SubCutaneous at bedtime  insulin lispro Injectable (ADMELOG) 3 Unit(s) SubCutaneous before breakfast  insulin lispro Injectable (ADMELOG) 4 Unit(s) SubCutaneous before lunch  insulin lispro Injectable (ADMELOG) 3 Unit(s) SubCutaneous before dinner  isoniazid 300 milliGRAM(s) Oral daily  levETIRAcetam 250 milliGRAM(s) Oral two times a day  levETIRAcetam 125 milliGRAM(s) Oral <User Schedule>  levothyroxine 88 MICROGram(s) Oral daily  pyridoxine 100 milliGRAM(s) Oral daily    PRN Inpatient Medications  acetaminophen   Tablet .. 650 milliGRAM(s) Oral every 6 hours PRN  dextrose 40% Gel 15 Gram(s) Oral once PRN  glucagon  Injectable 1 milliGRAM(s) IntraMuscular once PRN      REVIEW OF SYSTEMS  --------------------------------------------------------------------------------  Gen: No fevers/chills  Skin: No rashes  Head/Eyes/Ears: Normal hearing,  Normal vision   Respiratory: No dyspnea, cough  CV: No chest pain  GI: No abdominal pain, diarrhea, constipation, nausea, vomiting  : No dysuria, hematuria  MSK: No  edema  Heme: No easy bruising or bleeding  Psych: No significant depression    All other systems were reviewed and are negative, except as noted.    VITALS/PHYSICAL EXAM  --------------------------------------------------------------------------------  T(C): 37.2 (11-03-20 @ 04:39), Max: 37.6 (11-02-20 @ 20:56)  HR: 70 (11-03-20 @ 04:39) (70 - 133)  BP: 126/74 (11-03-20 @ 04:39) (126/74 - 164/102)  RR: 18 (11-03-20 @ 04:39) (18 - 22)  SpO2: 99% (11-03-20 @ 04:39) (96% - 100%)  Wt(kg): --  Height (cm): 177.8 (11-02-20 @ 19:52)  Weight (kg): 64 (11-02-20 @ 19:52)  BMI (kg/m2): 20.2 (11-02-20 @ 19:52)  BSA (m2): 1.8 (11-02-20 @ 19:52)      Physical Exam:  	Gen: NAD  	HEENT: MMM  	Pulm: CTA B/L  	CV: S1S2  	Abd: Soft, +BS   	Ext: No LE edema B/L  	Neuro: Awake, alert  	Skin: Warm and dry  	Vascular access: perma cath          :  no tucker  LABS/STUDIES  --------------------------------------------------------------------------------              12.8   5.88  >-----------<  212      [11-03-20 @ 07:13]              40.3     133  |  94  |  44  ----------------------------<  251      [11-03-20 @ 07:11]  5.0   |  25  |  5.50        Ca     9.5     [11-03-20 @ 07:11]      Mg     2.2     [11-02-20 @ 20:50]      Phos  3.2     [11-02-20 @ 20:50]    TPro  7.2  /  Alb  3.5  /  TBili  0.3  /  DBili  x   /  AST  15  /  ALT  7   /  AlkPhos  89  [11-03-20 @ 07:11]          Creatinine Trend:  SCr 5.50 [11-03 @ 07:11]  SCr 4.43 [11-02 @ 21:46]  SCr 4.19 [11-02 @ 20:50]    Urinalysis - [01-02-20 @ 20:34]      Color Light Yellow / Appearance Slightly Turbid / SG 1.012 / pH 7.0      Gluc 100 mg/dL / Ketone Negative  / Bili Negative / Urobili Negative       Blood Small / Protein 300 mg/dL / Leuk Est Moderate / Nitrite Negative      RBC 1 / WBC 46 / Hyaline 1 / Gran  / Sq Epi  / Non Sq Epi 0 / Bacteria Many      Iron 45, TIBC 145, %sat 31      [12-26-19 @ 23:24]  Ferritin 1369      [12-26-19 @ 23:24]  PTH -- (Ca 8.4)      [02-21-20 @ 08:54]   93  HbA1c 6.0      [02-21-20 @ 08:53]  TSH 0.54      [11-03-20 @ 04:46]  Lipid: chol 124, TG 59, HDL 64, LDL 47      [08-08-20 @ 00:47]    HBsAb >1000.0      [12-14-19 @ 23:48]  HBsAb Reactive      [03-13-17 @ 19:00]  HBsAg Nonreact      [12-14-19 @ 23:48]  HBcAb Reactive      [06-03-19 @ 15:57]  HCV 0.24, Nonreact      [08-09-20 @ 19:37]  HIV Nonreact      [08-09-20 @ 19:26]

## 2020-11-03 NOTE — H&P ADULT - NSHPREVIEWOFSYSTEMS_GEN_ALL_CORE
Gen: no night sweats or change in appetite  Eyes: no changes in vision or diplopia   ENT: no epistaxis, sinus pain, gingival bleeding, odynophagia or dysphagia  CV: no CP, PND or palpitations  Resp: no cough, wheezing, or hemoptysis  GI: no hematemesis, hematochezia, or melena  : no dysuria, polyuria, or hematuria  MSK: no arthralgias or joint swelling   Neuro: no gross sensory changes, numbness, focal deficits  Psych: no depression or changes in concentration  Heme/Onc: no purpura, petechiae or night sweats  Skin: no pruritus, hair loss or skin lesions  All: no photosensitivity, no complaints of anaphylaxis (SOB, throat swelling) Gen: no night sweats or change in appetite  Eyes: no changes in vision or diplopia   ENT: no epistaxis, sinus pain, gingival bleeding, odynophagia or dysphagia  CV: no CP, PND; +palpitations  Resp: no cough, wheezing, or hemoptysis  GI: no hematemesis, hematochezia, or melena  : no dysuria, polyuria, or hematuria  MSK: no arthralgias or joint swelling   Neuro: no gross sensory changes, numbness, focal deficits  Psych: no depression or changes in concentration  Heme/Onc: no purpura, petechiae or night sweats  Skin: no pruritus, hair loss or skin lesions  All: no photosensitivity, no complaints of anaphylaxis (SOB, throat swelling)

## 2020-11-03 NOTE — CHART NOTE - NSCHARTNOTEFT_GEN_A_CORE
CHAD DUNBAR    Notified by Telemetry Tech, pt's heart rate on telemetry was 120's bpm for 10 minutes. Pt was ambulating in the hallway. Asymptomatic.  No complaints of palpitations nor dizziness.         Interventions taken:  Pt received pm dose of Coreg 6.25 mg.  Will continue to monitor on telemetry.          Ghislaine Jacques ANP-[facility] #[id number]

## 2020-11-03 NOTE — CONSULT NOTE ADULT - ASSESSMENT
65M with PMHx of hypertension, ESRD, CAD (post-stents and CABG in August 2020). 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 Agree with uptitrate coreg to 6.25 mg BID for rate control   -cont Eliquis for A/C, cont 2.5 mg dose given ESKD   -cont statin     Will discuss with attending in AM     Israel Ledezma MD  Cardiology Fellow  [phone]   All Cardiology service information can be found 24/7 on [email or web address], password: [email or web address]   CHAD DUNBAR is a 65M with PMHx of hypertension, ESRD, CAD (post-stents and CABG in August 2020). 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 Agree with uptitrate coreg to 6.25 mg BID for rate control   -cont Eliquis for A/C, cont 2.5 mg dose given ESKD   -cont statin     Will discuss with attending in AM     Israel Ledezma MD  Cardiology Fellow      [phone]   All Cardiology service information can be found 24/7 on [email or web address], password: [email or web address]        Currently rate controlled afib/Aflutter , agree with increasing BB and cw eliquis  troponin downtrending, mild elevation in the setting of renal failure.  Patient seen and examined with the fellow, the note above has been edited to reflect my independent history, physical exam, assessment and plan.      Arturo Baldwin MD, PhD  Cardiology Attending  [facility]    For day time coverage Mon-Fri see Non-Service Consult Attending on [email or web address], password: [email or web address]; daytime weekends covered by general cardiology consult service attending.)

## 2020-11-03 NOTE — CONSULT NOTE ADULT - ASSESSMENT
65M with PMHx of hypertension, ESRD, CAD (post-stents and CABG in August 2020). 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

## 2020-11-03 NOTE — H&P ADULT - PROBLEM SELECTOR PLAN 1
No prior history and appears 2:1 with somewhat sawtooth pattern. Hemodynamically stable otherwise and mentating well    -TTE to evaluate for valvular disease (he has history of moderate-severe TR)  -Cardiology called by ED  -Rate control: will discontinue amlodipine to titrate Coreg from 3.125 to 6.25 mg BID  -Continue with Eliquis

## 2020-11-04 ENCOUNTER — TRANSCRIPTION ENCOUNTER (OUTPATIENT)
Age: 65
End: 2020-11-04

## 2020-11-04 VITALS
OXYGEN SATURATION: 98 % | RESPIRATION RATE: 18 BRPM | DIASTOLIC BLOOD PRESSURE: 98 MMHG | SYSTOLIC BLOOD PRESSURE: 167 MMHG | HEART RATE: 94 BPM | TEMPERATURE: 98 F

## 2020-11-04 DIAGNOSIS — I10 ESSENTIAL (PRIMARY) HYPERTENSION: ICD-10-CM

## 2020-11-04 DIAGNOSIS — E11.65 TYPE 2 DIABETES MELLITUS WITH HYPERGLYCEMIA: ICD-10-CM

## 2020-11-04 DIAGNOSIS — E78.5 HYPERLIPIDEMIA, UNSPECIFIED: ICD-10-CM

## 2020-11-04 DIAGNOSIS — E03.9 HYPOTHYROIDISM, UNSPECIFIED: ICD-10-CM

## 2020-11-04 LAB
ANION GAP SERPL CALC-SCNC: 19 MMOL/L — HIGH (ref 5–17)
BUN SERPL-MCNC: 75 MG/DL — HIGH (ref 7–23)
CALCIUM SERPL-MCNC: 9.2 MG/DL — SIGNIFICANT CHANGE UP (ref 8.4–10.5)
CHLORIDE SERPL-SCNC: 95 MMOL/L — LOW (ref 96–108)
CO2 SERPL-SCNC: 20 MMOL/L — LOW (ref 22–31)
CREAT SERPL-MCNC: 7.72 MG/DL — HIGH (ref 0.5–1.3)
GLUCOSE BLDC GLUCOMTR-MCNC: 178 MG/DL — HIGH (ref 70–99)
GLUCOSE BLDC GLUCOMTR-MCNC: 190 MG/DL — HIGH (ref 70–99)
GLUCOSE BLDC GLUCOMTR-MCNC: 226 MG/DL — HIGH (ref 70–99)
GLUCOSE SERPL-MCNC: 213 MG/DL — HIGH (ref 70–99)
HCT VFR BLD CALC: 34.9 % — LOW (ref 39–50)
HGB BLD-MCNC: 11.1 G/DL — LOW (ref 13–17)
MAGNESIUM SERPL-MCNC: 2.3 MG/DL — SIGNIFICANT CHANGE UP (ref 1.6–2.6)
MCHC RBC-ENTMCNC: 29.2 PG — SIGNIFICANT CHANGE UP (ref 27–34)
MCHC RBC-ENTMCNC: 31.8 GM/DL — LOW (ref 32–36)
MCV RBC AUTO: 91.8 FL — SIGNIFICANT CHANGE UP (ref 80–100)
NRBC # BLD: 0 /100 WBCS — SIGNIFICANT CHANGE UP (ref 0–0)
PHOSPHATE SERPL-MCNC: 3.7 MG/DL — SIGNIFICANT CHANGE UP (ref 2.5–4.5)
PLATELET # BLD AUTO: 218 K/UL — SIGNIFICANT CHANGE UP (ref 150–400)
POTASSIUM SERPL-MCNC: 4.4 MMOL/L — SIGNIFICANT CHANGE UP (ref 3.5–5.3)
POTASSIUM SERPL-SCNC: 4.4 MMOL/L — SIGNIFICANT CHANGE UP (ref 3.5–5.3)
RBC # BLD: 3.8 M/UL — LOW (ref 4.2–5.8)
RBC # FLD: 15.9 % — HIGH (ref 10.3–14.5)
SODIUM SERPL-SCNC: 134 MMOL/L — LOW (ref 135–145)
WBC # BLD: 6.97 K/UL — SIGNIFICANT CHANGE UP (ref 3.8–10.5)
WBC # FLD AUTO: 6.97 K/UL — SIGNIFICANT CHANGE UP (ref 3.8–10.5)

## 2020-11-04 PROCEDURE — 84133 ASSAY OF URINE POTASSIUM: CPT

## 2020-11-04 PROCEDURE — 99291 CRITICAL CARE FIRST HOUR: CPT | Mod: 25

## 2020-11-04 PROCEDURE — 82803 BLOOD GASES ANY COMBINATION: CPT

## 2020-11-04 PROCEDURE — 71045 X-RAY EXAM CHEST 1 VIEW: CPT

## 2020-11-04 PROCEDURE — U0003: CPT

## 2020-11-04 PROCEDURE — 93005 ELECTROCARDIOGRAM TRACING: CPT

## 2020-11-04 PROCEDURE — 99261: CPT

## 2020-11-04 PROCEDURE — 84100 ASSAY OF PHOSPHORUS: CPT

## 2020-11-04 PROCEDURE — 93306 TTE W/DOPPLER COMPLETE: CPT | Mod: 26

## 2020-11-04 PROCEDURE — 84300 ASSAY OF URINE SODIUM: CPT

## 2020-11-04 PROCEDURE — 85027 COMPLETE CBC AUTOMATED: CPT

## 2020-11-04 PROCEDURE — 82436 ASSAY OF URINE CHLORIDE: CPT

## 2020-11-04 PROCEDURE — 86769 SARS-COV-2 COVID-19 ANTIBODY: CPT

## 2020-11-04 PROCEDURE — 82330 ASSAY OF CALCIUM: CPT

## 2020-11-04 PROCEDURE — 84295 ASSAY OF SERUM SODIUM: CPT

## 2020-11-04 PROCEDURE — 82435 ASSAY OF BLOOD CHLORIDE: CPT

## 2020-11-04 PROCEDURE — 80048 BASIC METABOLIC PNL TOTAL CA: CPT

## 2020-11-04 PROCEDURE — 82962 GLUCOSE BLOOD TEST: CPT

## 2020-11-04 PROCEDURE — 84132 ASSAY OF SERUM POTASSIUM: CPT

## 2020-11-04 PROCEDURE — 99233 SBSQ HOSP IP/OBS HIGH 50: CPT

## 2020-11-04 PROCEDURE — 83735 ASSAY OF MAGNESIUM: CPT

## 2020-11-04 PROCEDURE — 85014 HEMATOCRIT: CPT

## 2020-11-04 PROCEDURE — 82947 ASSAY GLUCOSE BLOOD QUANT: CPT

## 2020-11-04 PROCEDURE — 80053 COMPREHEN METABOLIC PANEL: CPT

## 2020-11-04 PROCEDURE — 85025 COMPLETE CBC W/AUTO DIFF WBC: CPT

## 2020-11-04 PROCEDURE — 85018 HEMOGLOBIN: CPT

## 2020-11-04 PROCEDURE — 84443 ASSAY THYROID STIM HORMONE: CPT

## 2020-11-04 PROCEDURE — 83605 ASSAY OF LACTIC ACID: CPT

## 2020-11-04 PROCEDURE — 99223 1ST HOSP IP/OBS HIGH 75: CPT

## 2020-11-04 PROCEDURE — 96374 THER/PROPH/DIAG INJ IV PUSH: CPT | Mod: XU

## 2020-11-04 PROCEDURE — 81001 URINALYSIS AUTO W/SCOPE: CPT

## 2020-11-04 PROCEDURE — 97161 PT EVAL LOW COMPLEX 20 MIN: CPT

## 2020-11-04 PROCEDURE — 84484 ASSAY OF TROPONIN QUANT: CPT

## 2020-11-04 PROCEDURE — 93306 TTE W/DOPPLER COMPLETE: CPT

## 2020-11-04 RX ORDER — INSULIN DETEMIR 100/ML (3)
5 INSULIN PEN (ML) SUBCUTANEOUS
Qty: 1 | Refills: 0
Start: 2020-11-04 | End: 2020-12-03

## 2020-11-04 RX ORDER — INSULIN DETEMIR 100/ML (3)
5 INSULIN PEN (ML) SUBCUTANEOUS DAILY
Refills: 0 | Status: DISCONTINUED | OUTPATIENT
Start: 2020-11-04 | End: 2020-11-04

## 2020-11-04 RX ORDER — CARVEDILOL PHOSPHATE 80 MG/1
1 CAPSULE, EXTENDED RELEASE ORAL
Qty: 0 | Refills: 0 | DISCHARGE

## 2020-11-04 RX ORDER — INSULIN GLARGINE 100 [IU]/ML
4 INJECTION, SOLUTION SUBCUTANEOUS EVERY MORNING
Refills: 0 | Status: DISCONTINUED | OUTPATIENT
Start: 2020-11-05 | End: 2020-11-04

## 2020-11-04 RX ORDER — LEVOTHYROXINE SODIUM 125 MCG
1 TABLET ORAL
Qty: 30 | Refills: 0
Start: 2020-11-04 | End: 2020-12-03

## 2020-11-04 RX ORDER — INSULIN DETEMIR 100/ML (3)
4 INSULIN PEN (ML) SUBCUTANEOUS DAILY
Refills: 0 | Status: DISCONTINUED | OUTPATIENT
Start: 2020-11-04 | End: 2020-11-04

## 2020-11-04 RX ORDER — AMLODIPINE BESYLATE 2.5 MG/1
1 TABLET ORAL
Qty: 0 | Refills: 0 | DISCHARGE

## 2020-11-04 RX ORDER — LEVOTHYROXINE SODIUM 125 MCG
50 TABLET ORAL DAILY
Refills: 0 | Status: DISCONTINUED | OUTPATIENT
Start: 2020-11-04 | End: 2020-11-04

## 2020-11-04 RX ORDER — CARVEDILOL PHOSPHATE 80 MG/1
1 CAPSULE, EXTENDED RELEASE ORAL
Qty: 60 | Refills: 0
Start: 2020-11-04 | End: 2020-12-03

## 2020-11-04 RX ORDER — INSULIN GLARGINE 100 [IU]/ML
4 INJECTION, SOLUTION SUBCUTANEOUS ONCE
Refills: 0 | Status: COMPLETED | OUTPATIENT
Start: 2020-11-04 | End: 2020-11-04

## 2020-11-04 RX ADMIN — Medication 88 MICROGRAM(S): at 06:22

## 2020-11-04 RX ADMIN — Medication 3 UNIT(S): at 09:30

## 2020-11-04 RX ADMIN — Medication 1: at 09:30

## 2020-11-04 RX ADMIN — LEVETIRACETAM 250 MILLIGRAM(S): 250 TABLET, FILM COATED ORAL at 06:22

## 2020-11-04 RX ADMIN — INSULIN GLARGINE 4 UNIT(S): 100 INJECTION, SOLUTION SUBCUTANEOUS at 13:14

## 2020-11-04 RX ADMIN — APIXABAN 2.5 MILLIGRAM(S): 2.5 TABLET, FILM COATED ORAL at 06:22

## 2020-11-04 RX ADMIN — CARVEDILOL PHOSPHATE 6.25 MILLIGRAM(S): 80 CAPSULE, EXTENDED RELEASE ORAL at 18:01

## 2020-11-04 RX ADMIN — Medication 100 MILLIGRAM(S): at 12:52

## 2020-11-04 RX ADMIN — Medication 81 MILLIGRAM(S): at 12:52

## 2020-11-04 RX ADMIN — Medication 2: at 12:52

## 2020-11-04 RX ADMIN — APIXABAN 2.5 MILLIGRAM(S): 2.5 TABLET, FILM COATED ORAL at 18:00

## 2020-11-04 RX ADMIN — Medication 4 UNIT(S): at 12:51

## 2020-11-04 RX ADMIN — Medication 300 MILLIGRAM(S): at 13:15

## 2020-11-04 RX ADMIN — LEVETIRACETAM 250 MILLIGRAM(S): 250 TABLET, FILM COATED ORAL at 18:00

## 2020-11-04 NOTE — DISCHARGE NOTE PROVIDER - HOSPITAL COURSE
CHAD DUNBAR is a 65M with PMHx of hypertension, ESRD, CAD (post-stents and CABG in August 2020). 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 continue with coreg to 6.25 mg BID for rate control   -cont Eliquis for A/C, cont 2.5 mg dose given ESKD   -cont statin    CHAD DUNBAR is a 65M with PMHx of hypertension, ESRD, CAD (post-stents and CABG in August 2020). 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 Patient was at dialysis today went during dialysis they noted his HR was in the 130s. Patient was also feeling palpitations at that time.  Patient also tachycardic post-dialysis after they completed a session and was thus sent to ED for further evaluation. Patient denies lower extremity swelling, decrease exercise tolerance, or lightheadedness. While here patient noted to be in atrial flutter and given Metoprolol 50 mg once. In addition, given prior history of hyperkalemia patient also given  cc, Calcium, and Lokelma.     HR now controlled on increased beta blocker  no further cardiac testing needed, please arrange outpatient cardiology fu    #AFib/AFlutter   #CAD s/p multiple PCIs in the past and then 3V CABG in 8/2020   #ESKD on HD, undergoing transplant eval   #Elevated troponin, 83-> 95, chest pain free   #Chronic LBBB     -rapid AFib/AFlutter post dialysis is likely 2/2 hemodynamic changes, currently AFlutter in the 70s and comfortable. continue with coreg to 6.25 mg BID for rate control   -cont Eliquis for A/C, cont 2.5 mg dose given ESKD   -cont statin    CHAD DUNBAR is a 65M with PMHx of hypertension, ESRD, CAD (post-stents and CABG in August 2020). 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 Patient was at dialysis today went during dialysis they noted his HR was in the 130s. Patient was also feeling palpitations at that time.  Patient also tachycardic post-dialysis after they completed a session and was thus sent to ED for further evaluation. Patient denies lower extremity swelling, decrease exercise tolerance, or lightheadedness. While here patient noted to be in atrial flutter and given Metoprolol 50 mg once. In addition, given prior history of hyperkalemia patient also given  cc, Calcium, and Lokelma. pt on coreg 3.125 mg bid at home. cardiology consulted. Attributed rapid AFib/AFlutter post dialysis  to hemodynamic changes secondary to HD. Endo consulted for management of DM2 and hypothyroidism.  cont Humalog 3-4-3 w/ BG running 200s and started Levemir. now). TSH 0.54. He was on LT4 50mcg until last week, when it was increased libby 88mcg for unknown reason. Clinically he is hyperthyroid w/ c/o palpitations and tremors. decreased  LT4 back to 50mcg and  advised recheck TFT's in 4 weeks as outpt. Pt remained with HR well controlled; Received HD. Discharged home with outpt PT.

## 2020-11-04 NOTE — PHYSICAL THERAPY INITIAL EVALUATION ADULT - PLANNED THERAPY INTERVENTIONS, PT EVAL
transfer training/balance training/gait training/stairs: GOAL: Pt will negotiate 5 steps of stairs with or without appropriate assistive device and handrail via reciprocal/step-to technique indep in 2 weeks.

## 2020-11-04 NOTE — CHART NOTE - NSCHARTNOTEFT_GEN_A_CORE
Nurse reports that pt was not drinking thickened liquids PTA-pt is requesting thin liquids. RD made NP aware-RD recommends swallow evaluation for appropriate texture and liquid consistency. comprehensive assessment due on 11/8. pt possibly discharged today as per NP.

## 2020-11-04 NOTE — DISCHARGE NOTE PROVIDER - PROVIDER TOKENS
PROVIDER:[TOKEN:[[id number]],FOLLOWUP:[1 month]],PROVIDER:[TOKEN:[[id number]],FOLLOWUP:[1 week]],PROVIDER:[TOKEN:[[id number]],FOLLOWUP:[2 weeks]]

## 2020-11-04 NOTE — PHYSICAL THERAPY INITIAL EVALUATION ADULT - BALANCE TRAINING, PT EVAL
GOAL: Pt will demonstrate improved static/dynamic balance in sitting/standing where deficient by at least 1 grade to improve stability, decrease fall risk, and increase independence in ADLs within 2 weeks.

## 2020-11-04 NOTE — DISCHARGE NOTE PROVIDER - CARE PROVIDER_API CALL
Padmini Lincoln  INTERNAL MEDICINE  [address] [facility] [facility] [address] [facility], [address]  [place] [address]  Phone: [phone]  Fax: [phone]  Follow Up Time: 1 month    Jamie Corral  INTERNAL MEDICINE  [id number] [place] [address]  Phone: [phone]  Fax: [phone]  Follow Up Time: 1 week    Iker Alfred  INTERNAL MEDICINE  [address] [place] [address]  Phone: [phone]  Fax: [phone]  Follow Up Time: 2 weeks

## 2020-11-04 NOTE — PHYSICAL THERAPY INITIAL EVALUATION ADULT - MANUAL MUSCLE TESTING RESULTS, REHAB EVAL
muscles of LUE & LLE functionally assessed to be at least good-; muscles of RUE & RLE functionally assessed to be at least fair+

## 2020-11-04 NOTE — PROGRESS NOTE ADULT - SUBJECTIVE AND OBJECTIVE BOX
Patient is a 65y old  Male who presents with a chief complaint of Palpitations (2020 15:59)    HPI:  65M with PMHx of hypertension, ESRD, CAD (post-stents and CABG in 2020). 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 Patient was at dialysis today went during dialysis they noted his HR was in the 130s. Patient was also feeling palpitations at that time. He denies shortness of breath or lightheadedness. Patient also tachycardic post-dialysis after they completed a session and was thus sent to ED for further evaluation. Patient denies lower extremity swelling, decrease exercise tolerance, or lightheadedness. While here patient noted to be in atrial flutter and given Metoprolol 50 mg once. In addition, given prior history of hyperkalemia patient also given  cc, Calcium, and Lokelma. Plan further discussed with son who is a cardiologist. Patient is chronically on Coreg 3.125 mg BID. He has no complaints currently. He does not feel palpitations currently.        (2020 00:57)    SUBJECTIVE / OVERNIGHT EVENTS: No events over night.       MEDICATIONS  (STANDING):  apixaban 2.5 milliGRAM(s) Oral two times a day  aspirin enteric coated 81 milliGRAM(s) Oral daily  atorvastatin 80 milliGRAM(s) Oral at bedtime  carvedilol 6.25 milliGRAM(s) Oral every 12 hours  dextrose 5%. 1000 milliLiter(s) (50 mL/Hr) IV Continuous <Continuous>  dextrose 50% Injectable 12.5 Gram(s) IV Push once  dextrose 50% Injectable 25 Gram(s) IV Push once  dextrose 50% Injectable 25 Gram(s) IV Push once  insulin lispro (ADMELOG) corrective regimen sliding scale   SubCutaneous three times a day before meals  insulin lispro (ADMELOG) corrective regimen sliding scale   SubCutaneous at bedtime  insulin lispro Injectable (ADMELOG) 3 Unit(s) SubCutaneous before breakfast  insulin lispro Injectable (ADMELOG) 4 Unit(s) SubCutaneous before lunch  insulin lispro Injectable (ADMELOG) 3 Unit(s) SubCutaneous before dinner  isoniazid 300 milliGRAM(s) Oral daily  levETIRAcetam 250 milliGRAM(s) Oral two times a day  levETIRAcetam 125 milliGRAM(s) Oral <User Schedule>  levothyroxine 50 MICROGram(s) Oral daily  pyridoxine 100 milliGRAM(s) Oral daily    MEDICATIONS  (PRN):  acetaminophen   Tablet .. 650 milliGRAM(s) Oral every 6 hours PRN Temp greater or equal to 38C (100.4F), Mild Pain (1 - 3), Moderate Pain (4 - 6), Severe Pain (7 - 10)  dextrose 40% Gel 15 Gram(s) Oral once PRN Blood Glucose LESS THAN 70 milliGRAM(s)/deciliter  glucagon  Injectable 1 milliGRAM(s) IntraMuscular once PRN Glucose LESS THAN 70 milligrams/deciliter      CAPILLARY BLOOD GLUCOSE      POCT Blood Glucose.: 226 mg/dL (2020 12:46)  POCT Blood Glucose.: 178 mg/dL (2020 09:10)  POCT Blood Glucose.: 249 mg/dL (2020 21:50)  POCT Blood Glucose.: 277 mg/dL (2020 17:13)    I&O's Summary    2020 07:01  -  2020 07:00  --------------------------------------------------------  IN: 610 mL / OUT: 125 mL / NET: 485 mL    2020 07:01  -  2020 16:27  --------------------------------------------------------  IN: 180 mL / OUT: 0 mL / NET: 180 mL      T(C): 36.8 (20 @ 13:45), Max: 36.8 (20 @ 13:45)  HR: 88 (20 @ 13:45) (74 - 89)  BP: 165/83 (20 @ 13:45) (145/69 - 165/83)  RR: 17 (20 @ 13:45) (17 - 18)  SpO2: 93% (20 @ 13:45) (93% - 99%)    PHYSICAL EXAM:  GENERAL: NAD, well-developed  HEAD:  Atraumatic, Normocephalic  EYES: EOMI, PERRLA, conjunctiva and sclera clear  NECK: Supple, No JVD  CHEST/LUNG: Clear to auscultation bilaterally; No wheeze  HEART: Regular rate and rhythm; No murmurs, rubs, or gallops  ABDOMEN: Soft, Nontender, Nondistended; Bowel sounds present  EXTREMITIES:  2+ Peripheral Pulses, No clubbing, cyanosis, or edema  PSYCH: AAOx3  NEUROLOGY: non-focal  SKIN: No rashes or lesions                          11.1   6.97  )-----------( 218      ( 2020 07:13 )             34.9           LIVER FUNCTIONS - ( 2020 07:11 )  Alb: 3.5 g/dL / Pro: 7.2 g/dL / ALK PHOS: 89 U/L / ALT: 7 U/L / AST: 15 U/L / GGT: x             134|95|75<213  4.4|20|7.72  9.2,2.3,3.7  11-04 @ 07:10      Urinalysis Basic - ( 2020 14:29 )    Color: Light Yellow / Appearance: Clear / S.011 / pH: x  Gluc: x / Ketone: Negative  / Bili: Negative / Urobili: Negative   Blood: x / Protein: 300 mg/dL / Nitrite: Negative   Leuk Esterase: Negative / RBC: 3 /hpf / WBC 1 /HPF   Sq Epi: x / Non Sq Epi: 0 /hpf / Bacteria: Negative        RADIOLOGY & ADDITIONAL TESTS:    Imaging Personally Reviewed:    Consultant(s) Notes Reviewed:      Care Discussed with Consultants/Other Providers:

## 2020-11-04 NOTE — PHYSICAL THERAPY INITIAL EVALUATION ADULT - PERTINENT HX OF CURRENT PROBLEM, REHAB EVAL
65M PMH HTN, HLD, DM on insulin, hypothyroidism, CAD s/p stent, afib on eliquis, ESRD on HD MWF was sent from dialysis ([facility]) for hyperkalemia and tachycardia post dialysis today

## 2020-11-04 NOTE — DISCHARGE NOTE PROVIDER - NSDCMRMEDTOKEN_GEN_ALL_CORE_FT
amLODIPine 10 mg oral tablet: 1 tab(s) orally once a day  apixaban 2.5 mg oral tablet: 1 tab(s) orally 2 times a day  aspirin 81 mg oral delayed release tablet: 1 tab(s) orally once a day  atorvastatin 80 mg oral tablet: 1 tab(s) orally once a day (at bedtime)  Coreg 3.125 mg oral tablet: 1 tab(s) orally 2 times a day  isoniazid 300 mg oral tablet: 1 tab(s) orally once a day  levETIRAcetam: 125 milligram(s) orally 3 times a week after dialysis   levETIRAcetam 250 mg oral tablet: 1 tab(s) orally 2 times a day  levothyroxine 88 mcg (0.088 mg) oral tablet: 1 tab(s) orally once a day  NovoLOG FlexPen 100 units/mL injectable solution: injectable 3 times a day  3 before breakfast, 4 before lunch, 3 before dinner  pyridoxine 100 mg oral tablet: 1 tab(s) orally once a day   apixaban 2.5 mg oral tablet: 1 tab(s) orally 2 times a day  aspirin 81 mg oral delayed release tablet: 1 tab(s) orally once a day  atorvastatin 80 mg oral tablet: 1 tab(s) orally once a day (at bedtime)  carvedilol 6.25 mg oral tablet: 1 tab(s) orally every 12 hours  isoniazid 300 mg oral tablet: 1 tab(s) orally once a day  Levemir FlexTouch 100 units/mL subcutaneous solution: 5  subcutaneous once a day   levETIRAcetam: 125 milligram(s) orally 3 times a week after dialysis   levETIRAcetam 250 mg oral tablet: 1 tab(s) orally 2 times a day  levothyroxine 50 mcg (0.05 mg) oral tablet: 1 tab(s) orally once a day  NovoLOG FlexPen 100 units/mL injectable solution: injectable 3 times a day  3 before breakfast, 4 before lunch, 3 before dinner  pyridoxine 100 mg oral tablet: 1 tab(s) orally once a day

## 2020-11-04 NOTE — CONSULT NOTE ADULT - SUBJECTIVE AND OBJECTIVE BOX
HPI:  65M with PMHx of hypertension, ESRD, CAD (post-stents and CABG in August 2020). ESRD (M/W/F), T2DM, hypothyroidism prior CVA (December 2019 with residual right hand weakness) and paroxysmal atrial fibrillation presents with one day history of palpitations. Patient was at dialysis where they noted his HR was in the 130s. Patient was also feeling palpitations at that time. He denies shortness of breath or lightheadedness. Patient also tachycardic post-dialysis after they completed a session and was thus sent to ED for further evaluation. Patient denies lower extremity swelling, decrease exercise tolerance, or lightheadedness. While here patient noted to be in atrial flutter and given Metoprolol 50 mg once. In addition, given prior history of hyperkalemia patient also given  cc, Calcium, and Lokelma. Plan further discussed with son who is a cardiologist. Patient is chronically on Coreg 3.125 mg BID. He has no complaints currently. He does not feel palpitations currently.     Endo consulted for management of DM2 and hypothyroidism:  DM: He has had DM2 SINCE 1988. Outpt. joan is Dr. Lincoln. Last appt. was 9/2020. In 10/2020 he met w/ CDE at which point is Levemir was d/darren 2/2 hypoglycemia and he was continued on Humalog 3-4-3. He monitors BG at home and AM runs 130-160's. Before lunch they run 170's. He watches his diet and limits his carb intake. No juice or regular soda. He does some stationary exercises. He is UTD w/ optho and podiatry. Reports no retinopathy.    Hypothyroidism He has a h/o hypothyroidism for which he takes levothyroxine. As per note from endo 9/2020 he was on LT4 88mcg PO qam. Pt. states mfor the last several weeks or more he was taking LT4 50mcg but last week he was increased t0 88mcg. +Palpitations and tremors. No change in BM's. No temp intolerance. No joint pain or muscle cramping. No trouble sleeping.       (03 Nov 2020 00:57)      PAST MEDICAL & SURGICAL HISTORY:  ESRD on dialysis  M/W/F    Intubation of airway performed without difficulty  Dec 2019  CVA c/b status epilepticus requiring intubation and PEG in 12/2019-    PEG (percutaneous endoscopic gastrostomy) status  removed July 2020    Anemia    CVA (cerebral vascular accident)  12/13/19 with residual bilateral weakness    Hypothyroidism    CRI (Chronic Renal Insufficiency)    CAD (Coronary Artery Disease)  with Stents in 06/2009 and 6/2019, s/p off-pump C3L on 8/13/20    Dyslipidemia    Diabetes    Hypertension    S/P CABG x 3  off pump C3L on 8/13/20    History of insertion of stent into coronary artery bypass graft  2009 and 2019        FAMILY HISTORY:  No pertinent family history in first degree relatives        Social History:  No toxic habits    Outpatient Medications:  As per HPI    MEDICATIONS  (STANDING):  apixaban 2.5 milliGRAM(s) Oral two times a day  aspirin enteric coated 81 milliGRAM(s) Oral daily  atorvastatin 80 milliGRAM(s) Oral at bedtime  carvedilol 6.25 milliGRAM(s) Oral every 12 hours  dextrose 5%. 1000 milliLiter(s) (50 mL/Hr) IV Continuous <Continuous>  dextrose 50% Injectable 12.5 Gram(s) IV Push once  dextrose 50% Injectable 25 Gram(s) IV Push once  dextrose 50% Injectable 25 Gram(s) IV Push once  insulin lispro (ADMELOG) corrective regimen sliding scale   SubCutaneous three times a day before meals  insulin lispro (ADMELOG) corrective regimen sliding scale   SubCutaneous at bedtime  insulin lispro Injectable (ADMELOG) 3 Unit(s) SubCutaneous before breakfast  insulin lispro Injectable (ADMELOG) 4 Unit(s) SubCutaneous before lunch  insulin lispro Injectable (ADMELOG) 3 Unit(s) SubCutaneous before dinner  isoniazid 300 milliGRAM(s) Oral daily  levETIRAcetam 250 milliGRAM(s) Oral two times a day  levETIRAcetam 125 milliGRAM(s) Oral <User Schedule>  levothyroxine 88 MICROGram(s) Oral daily  pyridoxine 100 milliGRAM(s) Oral daily    MEDICATIONS  (PRN):  acetaminophen   Tablet .. 650 milliGRAM(s) Oral every 6 hours PRN Temp greater or equal to 38C (100.4F), Mild Pain (1 - 3), Moderate Pain (4 - 6), Severe Pain (7 - 10)  dextrose 40% Gel 15 Gram(s) Oral once PRN Blood Glucose LESS THAN 70 milliGRAM(s)/deciliter  glucagon  Injectable 1 milliGRAM(s) IntraMuscular once PRN Glucose LESS THAN 70 milligrams/deciliter      Allergies    No Known Allergies    Intolerances      Review of Systems:  Constitutional: No fever  Eyes: No blurry vision  Neuro: + tremors  HEENT: No pain  Cardiovascular: No chest pain, + palpitations  Respiratory: No SOB, no cough  GI: No nausea, vomiting, abdominal pain  : No dysuria  Skin: no rash  Psych: no depression  Endocrine: no polyuria, polydipsia  Hem/lymph: no swelling  Osteoporosis: no fractures    PHYSICAL EXAM:  VITALS: T(C): 36.4 (11-04-20 @ 04:25)  T(F): 97.5 (11-04-20 @ 04:25), Max: 98.7 (11-03-20 @ 12:09)  HR: 77 (11-04-20 @ 04:25) (64 - 116)  BP: 137/77 (11-04-20 @ 04:25) (137/77 - 154/70)  RR:  (18 - 18)  SpO2:  (98% - 98%)  Wt(kg): --  GENERAL: NAD, well-groomed, well-developed  EYES: No proptosis, anicteric  HEENT:  Atraumatic, Normocephalic, moist mucous membranes  RESPIRATORY: Clear to auscultation bilaterally  CARDIOVASCULAR: IRREG. RATE AND RHYTHM   GI: Soft, nontender, non distended, normal bowel sounds  SKIN: Dry, intact  MUSCULOSKELETAL: Full range of motion  PSYCH: Alert and oriented x 3, normal affect, normal mood      POCT Blood Glucose.: 178 mg/dL (11-04-20 @ 09:10)  POCT Blood Glucose.: 249 mg/dL (11-03-20 @ 21:50)  POCT Blood Glucose.: 277 mg/dL (11-03-20 @ 17:13)  POCT Blood Glucose.: 229 mg/dL (11-03-20 @ 12:48)  POCT Blood Glucose.: 207 mg/dL (11-03-20 @ 08:39)  POCT Blood Glucose.: 126 mg/dL (11-03-20 @ 01:37)                            11.1   6.97  )-----------( 218      ( 04 Nov 2020 07:13 )             34.9       11-04    134<L>  |  95<L>  |  75<H>  ----------------------------<  213<H>  4.4   |  20<L>  |  7.72<H>    EGFR if : 8<L>  EGFR if non : 7<L>    Ca    9.2      11-04  Mg     2.3     11-04  Phos  3.7     11-04    TPro  7.2  /  Alb  3.5  /  TBili  0.3  /  DBili  x   /  AST  15  /  ALT  7<L>  /  AlkPhos  89  11-03      Thyroid Function Tests:  11-03 @ 04:46 TSH 0.54 FreeT4 -- T3 -- Anti TPO -- Anti Thyroglobulin Ab -- TSI --              Radiology:

## 2020-11-04 NOTE — CONSULT NOTE ADULT - PROBLEM SELECTOR RECOMMENDATION 4
TSH 0.54. He was on LT4 50mcg untiul last week, when it was increased libby 88mcg for unknown reason. Clinically he is hyperthyroid w/ c/o palpitations and tremors.  Will decrease LT4 back to 50mcg and recheck TFT's in 4 weeks as outpt.

## 2020-11-04 NOTE — PROGRESS NOTE ADULT - SUBJECTIVE AND OBJECTIVE BOX
[facility]   MD NINA MASON MD RUORU WONG, PA    TEL:  OFFICE: [phone]  DR HSU CELL: [phone]  RAS MORELOS CELL: [phone]  DR. MARQUEZ CELL: [phone]  DR. ALEX CELL: [phone]    FROM 5 PM - 7 AM PLEASE CALL ANSWERING SERVICE: [phone]    RENAL FOLLOW UP NOTE--Date of Service 11-04-20 @ 09:35  --------------------------------------------------------------------------------  HPI:      Pt seen and examined at bedside.   Denies SOB, chest pain     PAST HISTORY  --------------------------------------------------------------------------------  No significant changes to PMH, PSH, FHx, SHx, unless otherwise noted    ALLERGIES & MEDICATIONS  --------------------------------------------------------------------------------  Allergies    No Known Allergies    Intolerances      Standing Inpatient Medications  apixaban 2.5 milliGRAM(s) Oral two times a day  aspirin enteric coated 81 milliGRAM(s) Oral daily  atorvastatin 80 milliGRAM(s) Oral at bedtime  carvedilol 6.25 milliGRAM(s) Oral every 12 hours  dextrose 5%. 1000 milliLiter(s) IV Continuous <Continuous>  dextrose 50% Injectable 12.5 Gram(s) IV Push once  dextrose 50% Injectable 25 Gram(s) IV Push once  dextrose 50% Injectable 25 Gram(s) IV Push once  insulin lispro (ADMELOG) corrective regimen sliding scale   SubCutaneous three times a day before meals  insulin lispro (ADMELOG) corrective regimen sliding scale   SubCutaneous at bedtime  insulin lispro Injectable (ADMELOG) 3 Unit(s) SubCutaneous before breakfast  insulin lispro Injectable (ADMELOG) 4 Unit(s) SubCutaneous before lunch  insulin lispro Injectable (ADMELOG) 3 Unit(s) SubCutaneous before dinner  isoniazid 300 milliGRAM(s) Oral daily  levETIRAcetam 250 milliGRAM(s) Oral two times a day  levETIRAcetam 125 milliGRAM(s) Oral <User Schedule>  levothyroxine 88 MICROGram(s) Oral daily  pyridoxine 100 milliGRAM(s) Oral daily    PRN Inpatient Medications  acetaminophen   Tablet .. 650 milliGRAM(s) Oral every 6 hours PRN  dextrose 40% Gel 15 Gram(s) Oral once PRN  glucagon  Injectable 1 milliGRAM(s) IntraMuscular once PRN      REVIEW OF SYSTEMS  --------------------------------------------------------------------------------  General: no fever  CVS: no chest pain  RESP: no sob, no cough  ABD: no abdominal pain  : no dysuria,  MSK: trace edema     VITALS/PHYSICAL EXAM  --------------------------------------------------------------------------------  T(C): 36.4 (11-04-20 @ 04:25), Max: 37.1 (11-03-20 @ 12:09)  HR: 77 (11-04-20 @ 04:25) (64 - 116)  BP: 137/77 (11-04-20 @ 04:25) (137/77 - 154/70)  RR: 18 (11-04-20 @ 04:25) (18 - 18)  SpO2: 98% (11-04-20 @ 04:25) (98% - 98%)  Wt(kg): --  Height (cm): 177.8 (11-02-20 @ 19:52)  Weight (kg): 64 (11-02-20 @ 19:52)  BMI (kg/m2): 20.2 (11-02-20 @ 19:52)  BSA (m2): 1.8 (11-02-20 @ 19:52)      11-03-20 @ 07:01  -  11-04-20 @ 07:00  --------------------------------------------------------  IN: 610 mL / OUT: 125 mL / NET: 485 mL      Physical Exam:  	Gen: NAD  	HEENT: MMM  	Pulm: CTA B/L  	CV: S1S2  	Abd: Soft, +BS  	Ext: trace LE edema B/L                      Neuro: Awake   	Skin: Warm and Dry   	Vascular access: perma cath          FABIÁN tucker  LABS/STUDIES  --------------------------------------------------------------------------------              11.1   6.97  >-----------<  218      [11-04-20 @ 07:13]              34.9     134  |  95  |  75  ----------------------------<  213      [11-04-20 @ 07:10]  4.4   |  20  |  7.72        Ca     9.2     [11-04-20 @ 07:10]      Mg     2.3     [11-04-20 @ 07:10]      Phos  3.7     [11-04-20 @ 07:10]    TPro  7.2  /  Alb  3.5  /  TBili  0.3  /  DBili  x   /  AST  15  /  ALT  7   /  AlkPhos  89  [11-03-20 @ 07:11]          Creatinine Trend:  SCr 7.72 [11-04 @ 07:10]  SCr 5.50 [11-03 @ 07:11]  SCr 4.43 [11-02 @ 21:46]  SCr 4.19 [11-02 @ 20:50]    Urinalysis - [11-03-20 @ 14:29]      Color Light Yellow / Appearance Clear / SG 1.011 / pH 8.0      Gluc 500 mg/dL / Ketone Negative  / Bili Negative / Urobili Negative       Blood Trace / Protein 300 mg/dL / Leuk Est Negative / Nitrite Negative      RBC 3 / WBC 1 / Hyaline 1 / Gran  / Sq Epi  / Non Sq Epi 0 / Bacteria Negative    Urine Sodium 61      [11-03-20 @ 14:29]  Urine Potassium 35      [11-03-20 @ 14:29]  Urine Chloride <35      [11-03-20 @ 14:29]    Iron 45, TIBC 145, %sat 31      [12-26-19 @ 23:24]  Ferritin 1369      [12-26-19 @ 23:24]  PTH -- (Ca 8.4)      [02-21-20 @ 08:54]   93  HbA1c 6.0      [02-21-20 @ 08:53]  TSH 0.54      [11-03-20 @ 04:46]  Lipid: chol 124, TG 59, HDL 64, LDL 47      [08-08-20 @ 00:47]

## 2020-11-04 NOTE — PROGRESS NOTE ADULT - PROBLEM SELECTOR PLAN 1
Increased Coreg, rate controlled, continue with Eliquis.   reviewed Echo- dilated Left atrium.   Cards following.

## 2020-11-04 NOTE — DISCHARGE NOTE PROVIDER - CARE PROVIDERS DIRECT ADDRESSES
,[email or web address]",[email or web address],[email or web address]

## 2020-11-04 NOTE — CONSULT NOTE ADULT - PROBLEM SELECTOR RECOMMENDATION 9
Hba1c 6.6 8/2020 in setting of anemia, will recheck.  Current regimen Humalog 3-4-3 w/ BG running >200. Will adjust regimen as follows"  -Levemir 5u daily (1st dose now)  -c/w Humalog 3-4-3  -low dose SS TIDAC/qhs  -Monitor BG TIDAC/qhs    d/c regimen: f/u w/ Dr. Lincoln, on insulin, regimen TBD.

## 2020-11-04 NOTE — DISCHARGE NOTE PROVIDER - NSDCFUSCHEDAPPT_GEN_ALL_CORE_FT
CHAD DUNBAR ; 11/10/2020 ; NPP Med  Comm CHAD Uriarte ; 11/18/2020 ; NPP Cardio [phone]  CHAD DUNBAR ; 12/28/2020 ; NPP Med Endocr [phone] [facility]

## 2020-11-04 NOTE — DISCHARGE NOTE NURSING/CASE MANAGEMENT/SOCIAL WORK - PATIENT PORTAL LINK FT
You can access the FollowMyHealth Patient Portal offered by [facility] by registering at the following website: [email or web address]. By joining [employer]’s FollowMyHealth portal, you will also be able to view your health information using other applications (apps) compatible with our system.

## 2020-11-04 NOTE — PROGRESS NOTE ADULT - ASSESSMENT
65M with PMHx of hypertension, ESRD, CAD (post-stents and CABG in August 2020). 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 hand weakness) and paroxysmal atrial fibrillation presents with one day history of palpitation  Nephroogy consulted for ESRD management    ESRD on HD ( MWF) via perma cath  PT from [facility]  s/p CABG  8/13  s/p perma cath on 8/18  Plan for HD today  Consent obtained for HD  MOnitor BMP  Pt should get AVF however family not agreeable at present      Anemia  Hb at goal for ESRD   Monitor at present    HTN  Bp stable  Monitor BP  Low salt diet    CKD -BMD  Check PTH  Monitor serum calcium and PO4

## 2020-11-04 NOTE — DISCHARGE NOTE PROVIDER - NSDCCPCAREPLAN_GEN_ALL_CORE_FT
PRINCIPAL DISCHARGE DIAGNOSIS  Diagnosis: Atrial flutter with rapid ventricular response  Assessment and Plan of Treatment: continue coreg at current dose  cont ELiquis      SECONDARY DISCHARGE DIAGNOSES  Diagnosis: Latent tuberculosis  Assessment and Plan of Treatment: continu INH and Pyridoxine    Diagnosis: ESRD on dialysis  Assessment and Plan of Treatment: continue HD   Follow up with Nephrology    Diagnosis: Uncontrolled type 2 diabetes mellitus with hyperglycemia  Assessment and Plan of Treatment: Last HgA1C 6.6  Make sure you get your HgA1c checked every three months.  If you take oral diabetes medications, check your blood glucose two times a day.  If you take insulin, check your blood glucose before meals and at bedtime.  It's important not to skip any meals.  Keep a log of your blood glucose results and always take it with you to your doctor appointments.  Keep a list of your current medications including injectables and over the counter medications and bring this medication list with you to all your doctor appointments.  If you have not seen your ophthalmologist this year call for appointment.  Check your feet daily for redness, sores, or openings. Do not self treat. If no improvement in two days call your primary care physician for an appointment.  Low blood sugar (hypoglycemia) is a blood sugar below 70mg/dl. Check your blood sugar if you feel signs/symptoms of hypoglycemia. If your blood sugar is below 70 take 15 grams of carbohydrates (ex 4 oz of apple juice, 3-4 glucose tablets, or 4-6 oz of regular soda) wait 15 minutes and repeat blood sugar to make sure it comes up above 70.  If your blood sugar is above 70 and you are due for a meal, have a meal.  If you are not due for a meal have a snack.  This snack helps keeps your blood sugar at a safe range.

## 2020-11-04 NOTE — CONSULT NOTE ADULT - ASSESSMENT
65M with PMHx of hypertension, ESRD, CAD (post-stents and CABG in August 2020). ESRD (M/W/F), T2DM, hypothyroidism prior CVA (December 2019 with residual right hand weakness) and paroxysmal atrial fibrillation presents with one day history of palpitations, found to be in afib. Endo consulted for management of DM2 and hypothyroidism.

## 2020-11-04 NOTE — PROGRESS NOTE ADULT - SUBJECTIVE AND OBJECTIVE BOX
SUBJ: HR controlled, TSH 0.5    MEDICATIONS  (STANDING):  apixaban 2.5 milliGRAM(s) Oral two times a day  aspirin enteric coated 81 milliGRAM(s) Oral daily  atorvastatin 80 milliGRAM(s) Oral at bedtime  carvedilol 6.25 milliGRAM(s) Oral every 12 hours  dextrose 5%. 1000 milliLiter(s) (50 mL/Hr) IV Continuous <Continuous>  dextrose 50% Injectable 12.5 Gram(s) IV Push once  dextrose 50% Injectable 25 Gram(s) IV Push once  dextrose 50% Injectable 25 Gram(s) IV Push once  insulin lispro (ADMELOG) corrective regimen sliding scale   SubCutaneous three times a day before meals  insulin lispro (ADMELOG) corrective regimen sliding scale   SubCutaneous at bedtime  insulin lispro Injectable (ADMELOG) 3 Unit(s) SubCutaneous before breakfast  insulin lispro Injectable (ADMELOG) 4 Unit(s) SubCutaneous before lunch  insulin lispro Injectable (ADMELOG) 3 Unit(s) SubCutaneous before dinner  isoniazid 300 milliGRAM(s) Oral daily  levETIRAcetam 250 milliGRAM(s) Oral two times a day  levETIRAcetam 125 milliGRAM(s) Oral <User Schedule>  levothyroxine 50 MICROGram(s) Oral daily  pyridoxine 100 milliGRAM(s) Oral daily    MEDICATIONS  (PRN):  acetaminophen   Tablet .. 650 milliGRAM(s) Oral every 6 hours PRN Temp greater or equal to 38C (100.4F), Mild Pain (1 - 3), Moderate Pain (4 - 6), Severe Pain (7 - 10)  dextrose 40% Gel 15 Gram(s) Oral once PRN Blood Glucose LESS THAN 70 milliGRAM(s)/deciliter  glucagon  Injectable 1 milliGRAM(s) IntraMuscular once PRN Glucose LESS THAN 70 milligrams/deciliter      Vital Signs Last 24 Hrs  T(C): 36.8 (04 Nov 2020 13:45), Max: 36.8 (04 Nov 2020 13:45)  T(F): 98.2 (04 Nov 2020 13:45), Max: 98.2 (04 Nov 2020 13:45)  HR: 88 (04 Nov 2020 13:45) (74 - 116)  BP: 165/83 (04 Nov 2020 13:45) (137/77 - 165/83)  BP(mean): --  RR: 17 (04 Nov 2020 13:45) (17 - 18)  SpO2: 93% (04 Nov 2020 13:45) (93% - 99%)    REVIEW OF SYSTEMS:  CONSTITUTIONAL: No fever, weight loss, or fatigue  EYES: No eye pain, visual disturbances, or discharge  ENMT:  No difficulty hearing, tinnitus, vertigo; No sinus or throat pain  NECK: No pain or stiffness  RESPIRATORY: No cough, wheezing, chills or hemoptysis; No shortness of breath  CARDIOVASCULAR: No chest pain, palpitations, dizziness, or leg swelling  GASTROINTESTINAL: No abdominal or epigastric pain. No nausea, vomiting, or hematemesis; No diarrhea or constipation. No melena or hematochezia.  GENITOURINARY: No dysuria, frequency, hematuria, or incontinence  NEUROLOGICAL: No headaches, memory loss, loss of strength, numbness, or tremors  SKIN: No itching, burning, rashes, or lesions   LYMPH NODES: No enlarged glands  ENDOCRINE: No heat or cold intolerance; No hair loss  MUSCULOSKELETAL: No joint pain or swelling; No muscle, back, or extremity pain  PSYCHIATRIC: No depression, anxiety, mood swings, or difficulty sleeping  HEME/LYMPH: No easy bruising, or bleeding gums  ALLERY AND IMMUNOLOGIC: No hives or eczema          PHYSICAL EXAM:  Vital Signs Last 24 Hrs  T(C): 36.8 (04 Nov 2020 13:45), Max: 36.8 (04 Nov 2020 13:45)  T(F): 98.2 (04 Nov 2020 13:45), Max: 98.2 (04 Nov 2020 13:45)  HR: 88 (04 Nov 2020 13:45) (74 - 116)  BP: 165/83 (04 Nov 2020 13:45) (137/77 - 165/83)  BP(mean): --  RR: 17 (04 Nov 2020 13:45) (17 - 18)  SpO2: 93% (04 Nov 2020 13:45) (93% - 99%)    GENERAL: No acute distress, well-developed  HEAD:  Atraumatic, Normocephalic  ENT: EOMI, PERRLA, conjunctiva and sclera clear, Neck supple, No JVD, moist mucosa  CHEST/LUNG: Clear to auscultation bilaterally; No wheeze, equal breath sounds bilaterally   BACK: No spinal tenderness  HEART: Regular rate and rhythm; No murmurs, rubs, or gallops  ABDOMEN: Soft, Nontender, Nondistended; Bowel sounds present  EXTREMITIES:  No clubbing, cyanosis, or edema  PSYCH: Nl behavior, nl affect  NEUROLOGY: AAOx3, non-focal, cranial nerves intact  SKIN: Normal color, No rashes or lesions    TELEMETRY:aflutter 60-90      LABS:   CBC Full  -  ( 04 Nov 2020 07:13 )  WBC Count : 6.97 K/uL  RBC Count : 3.80 M/uL  Hemoglobin : 11.1 g/dL  Hematocrit : 34.9 %  Platelet Count - Automated : 218 K/uL  Mean Cell Volume : 91.8 fl  Mean Cell Hemoglobin : 29.2 pg  Mean Cell Hemoglobin Concentration : 31.8 gm/dL  Auto Neutrophil # : x  Auto Lymphocyte # : x  Auto Monocyte # : x  Auto Eosinophil # : x  Auto Basophil # : x  Auto Neutrophil % : x  Auto Lymphocyte % : x  Auto Monocyte % : x  Auto Eosinophil % : x  Auto Basophil % : x      11-04    134<L>  |  95<L>  |  75<H>  ----------------------------<  213<H>  4.4   |  20<L>  |  7.72<H>    Ca    9.2      04 Nov 2020 07:10  Phos  3.7     11-04  Mg     2.3     11-04    TPro  7.2  /  Alb  3.5  /  TBili  0.3  /  DBili  x   /  AST  15  /  ALT  7<L>  /  AlkPhos  89  11-03          RADIOLOGY & ADDITIONAL STUDIES:    IMPRESSION AND PLAN:    · Assessment	  CHAD DUNBAR is a 65M with PMHx of hypertension, ESRD, CAD (post-stents and CABG in August 2020). 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 continue with coreg to 6.25 mg BID for rate control   -cont Eliquis for A/C, cont 2.5 mg dose given ESKD   -cont statin     Arturo Baldwin MD, PhD  Cardiology Attending  [facility]/ [facility]    For day time coverage Mon-Fri see Non-Service Consult Attending on [email or web address], password: [email or web address]; daytime weekends covered by general cardiology consult service attending.)

## 2020-11-10 ENCOUNTER — APPOINTMENT (OUTPATIENT)
Dept: INFECTIOUS DISEASE | Facility: CLINIC | Age: 65
End: 2020-11-10
Payer: MEDICARE

## 2020-11-10 VITALS
RESPIRATION RATE: 18 BRPM | SYSTOLIC BLOOD PRESSURE: 146 MMHG | DIASTOLIC BLOOD PRESSURE: 82 MMHG | WEIGHT: 139 LBS | HEIGHT: 70 IN | OXYGEN SATURATION: 100 % | BODY MASS INDEX: 19.9 KG/M2 | TEMPERATURE: 98.9 F | HEART RATE: 81 BPM

## 2020-11-10 PROCEDURE — 99214 OFFICE O/P EST MOD 30 MIN: CPT

## 2020-11-10 NOTE — ASSESSMENT
[FreeTextEntry1] : 64 M ESRD on HD MWF, CAD s/p stents, IDDMII, hospital course in December 2019 surrounding CVA c/b status epilepticus requiring intubation and PEG placement s/p removal who presented to ID office for followup. \par \par Admitted 8/7/20 - 8/19/20 for CABG. \par Noted to have outpatient dialysis center PPD which was positive. \par In House Quantiferon Negative\par CT Chest (8/8) with perifissural opacties (stable from prior). \par Patient had 3x sputum samples obtained for AFB which were smear negative. \par AFB sputum cultures with no growth at 6 weeks. \par \par Patient's nephew had TB and patient from endemic area ([place]) \par While Quantiferon negative given positive PPD and given pulmonary nodules I would treat for latent TB\par Started on INH/B6 on 10/14/20\par Will check CBC with diff and CMP monthly\par \par #Latent TB\par --Check CBC and CMP\par --Continue INH/B6 x 9 months\par --No absolute ID contraindication for renal transplant\par \par #Pre-Renal Transplant, Positive Strongyloides Antibody\par --No absolute ID contraindication for renal transplant \par --Strongyloides antibody positive - s/p Ivermectin \par \par #Encounter to Vaccinate patient\par --Appears to be UTD for pre-transplant vaccines\par --Received Influenza vaccine in 10/2020\par \par Followup: 1 month

## 2020-11-10 NOTE — HISTORY OF PRESENT ILLNESS
[FreeTextEntry1] : 65 M ESRD on HD MWF, CAD s/p stents, IDDMII, hospital course in December 2019 surrounding CVA c/b status epilepticus requiring intubation and PEG placement s/p removal who presented to ID office for followup. Of note, admitted 8/7/20 - 8/19/20 for CABG. Noted to have outpatient dialysis center PPD which was positive. CT Chest (8/8) with perifissural opacties (stable from prior). Patient had 3x sputum samples obtained for AFB which were smear negative. AFB sputum cultures with no growth at 6 weeks. Started on INH/B6 on 10/14/20\par \par Patient denies chills or fevers. Denies cough or SOB. Denies weight loss.

## 2020-11-16 LAB
ALBUMIN SERPL ELPH-MCNC: 4.1 G/DL
ALP BLD-CCNC: 109 U/L
ALT SERPL-CCNC: 8 U/L
ANION GAP SERPL CALC-SCNC: 21 MMOL/L
AST SERPL-CCNC: 12 U/L
BASOPHILS # BLD AUTO: 0.04 K/UL
BASOPHILS NFR BLD AUTO: 0.5 %
BILIRUB SERPL-MCNC: 0.3 MG/DL
BUN SERPL-MCNC: 56 MG/DL
CALCIUM SERPL-MCNC: 9.1 MG/DL
CHLORIDE SERPL-SCNC: 90 MMOL/L
CO2 SERPL-SCNC: 21 MMOL/L
CREAT SERPL-MCNC: 6.38 MG/DL
EOSINOPHIL # BLD AUTO: 0.72 K/UL
EOSINOPHIL NFR BLD AUTO: 9.5 %
GLUCOSE SERPL-MCNC: 191 MG/DL
HCT VFR BLD CALC: 40.8 %
HGB BLD-MCNC: 12.7 G/DL
IMM GRANULOCYTES NFR BLD AUTO: 0.3 %
LYMPHOCYTES # BLD AUTO: 1.09 K/UL
LYMPHOCYTES NFR BLD AUTO: 14.4 %
MAN DIFF?: NORMAL
MCHC RBC-ENTMCNC: 28.1 PG
MCHC RBC-ENTMCNC: 31.1 GM/DL
MCV RBC AUTO: 90.3 FL
MONOCYTES # BLD AUTO: 0.69 K/UL
MONOCYTES NFR BLD AUTO: 9.1 %
NEUTROPHILS # BLD AUTO: 5 K/UL
NEUTROPHILS NFR BLD AUTO: 66.2 %
PLATELET # BLD AUTO: 341 K/UL
POTASSIUM SERPL-SCNC: 5.1 MMOL/L
PROT SERPL-MCNC: 7.4 G/DL
RBC # BLD: 4.52 M/UL
RBC # FLD: 15.8 %
SODIUM SERPL-SCNC: 133 MMOL/L
WBC # FLD AUTO: 7.56 K/UL

## 2020-11-18 ENCOUNTER — APPOINTMENT (OUTPATIENT)
Dept: CARDIOLOGY | Facility: CLINIC | Age: 65
End: 2020-11-18
Payer: MEDICARE

## 2020-11-18 ENCOUNTER — NON-APPOINTMENT (OUTPATIENT)
Age: 65
End: 2020-11-18

## 2020-11-18 VITALS
TEMPERATURE: 97.5 F | RESPIRATION RATE: 12 BRPM | HEIGHT: 70 IN | SYSTOLIC BLOOD PRESSURE: 131 MMHG | BODY MASS INDEX: 19.76 KG/M2 | HEART RATE: 63 BPM | DIASTOLIC BLOOD PRESSURE: 72 MMHG | WEIGHT: 138 LBS | OXYGEN SATURATION: 99 %

## 2020-11-18 PROCEDURE — 99214 OFFICE O/P EST MOD 30 MIN: CPT

## 2020-11-18 RX ORDER — AMLODIPINE BESYLATE 10 MG/1
10 TABLET ORAL DAILY
Qty: 90 | Refills: 2 | Status: DISCONTINUED | COMMUNITY
Start: 2020-09-03 | End: 2020-11-18

## 2020-11-18 NOTE — HISTORY OF PRESENT ILLNESS
[FreeTextEntry1] : Srinivasan is getting stronger. He had an episode of tachycardia after dialysis. He went to the hospital and everything was ok. His levothyroxine was most likely trigger and dose decreased to 50mcg again. Amlodipine stopped and coreg increased again to 6.25. Now being treated for latent Tb for six months. Hope is for transplant next month. He is attending PT and getting stronger. Here today with his son.

## 2020-11-18 NOTE — DISCUSSION/SUMMARY
[___ Month(s)] : [unfilled] month(s) [FreeTextEntry1] : The patient is a 64-year-old gentleman diabetes mellitus, hypertension, hyperlipidemia, hypothyroidism, ESRD, coronary artery disease s/p stroke, afib, s/p 3V CABG who had another episode of tachycardia thought to be related to increase dose of levothyroxine.\par #1 CAD- s/p CABG, euvolemic, no angina or HF, continue PT\par #2 Htn-  coreg 6.25mg bid, telmesartan 20mg (was 80) for now, son monitors closely\par #3 ESRD- on dialysis,  There are no cardiac contraindications to proceeding with renal transplant\par #4 DM- on insulin, good control\par #5 Afib- on eliquis, no bleeding\par #6 Neuro- on keppra\par #7 Hypothyroid- decreased dose to 50mcg\par #8 ID- prophylactic treatment for latent Tb for six months\par \par

## 2020-11-25 ENCOUNTER — APPOINTMENT (OUTPATIENT)
Dept: NEPHROLOGY | Facility: CLINIC | Age: 65
End: 2020-11-25
Payer: COMMERCIAL

## 2020-11-25 VITALS
OXYGEN SATURATION: 100 % | DIASTOLIC BLOOD PRESSURE: 86 MMHG | HEART RATE: 59 BPM | BODY MASS INDEX: 18.61 KG/M2 | HEIGHT: 70 IN | TEMPERATURE: 98 F | WEIGHT: 130 LBS | RESPIRATION RATE: 12 BRPM | SYSTOLIC BLOOD PRESSURE: 187 MMHG

## 2020-11-25 VITALS — SYSTOLIC BLOOD PRESSURE: 164 MMHG | DIASTOLIC BLOOD PRESSURE: 70 MMHG

## 2020-11-25 PROCEDURE — 99215 OFFICE O/P EST HI 40 MIN: CPT

## 2020-11-25 NOTE — REASON FOR VISIT
[Follow-Up] : a follow-up visit [Family Member] : family member [FreeTextEntry1] : Pre kidney transplant follow up evaluation

## 2020-11-25 NOTE — PHYSICAL EXAM
[General Appearance - Alert] : alert [General Appearance - In No Acute Distress] : in no acute distress [Sclera] : the sclera and conjunctiva were normal [Extraocular Movements] : extraocular movements were intact [Jugular Venous Distention Increased] : there was no jugular-venous distention [Auscultation Breath Sounds / Voice Sounds] : lungs were clear to auscultation bilaterally [Heart Sounds Gallop] : no gallops [Heart Sounds Pericardial Friction Rub] : no pericardial rub [Arterial Pulses Carotid] : carotid pulses were normal with no bruits [Full Pulse] : the pedal pulses are present [Edema] : there was no peripheral edema [Abdomen Soft] : soft [Abdomen Tenderness] : non-tender [Cervical Lymph Nodes Enlarged Posterior Bilaterally] : posterior cervical [Cervical Lymph Nodes Enlarged Anterior Bilaterally] : anterior cervical [Supraclavicular Lymph Nodes Enlarged Bilaterally] : supraclavicular [Axillary Lymph Nodes Enlarged Bilaterally] : axillary [Abnormal Walk] : normal gait [Dialysis Catheter] : dialysis catheter [] : no rash [Motor Exam] : the motor exam was normal [No Focal Deficits] : no focal deficits [Oriented To Time, Place, And Person] : oriented to person, place, and time [Impaired Insight] : insight and judgment were intact [Affect] : the affect was normal [Mood] : the mood was normal [FreeTextEntry1] : Left I/J tunneled catheter

## 2020-11-25 NOTE — HISTORY OF PRESENT ILLNESS
[FreeTextEntry1] : 64 years old  male, living in  since . Was seen by telehealth follow up on 2020.\par Patient has known CKD (), ESRD on hemodialysis (2019- [facility] dialysis unit in [place]) on follow up with Dr. Waters is here for pre kidney transplant evaluation. \par He has known DM () On insulin (); HTN (). CAD stent (2019) 2019 again had pos stress test  CVA (Dec 2019, just as he completed dialysis), had weakness of right leg and arm, was in rehab was in [facility] and now at home. He had PEG post CVA for 4 weeks and stopped using it in 2020 ,  was removed in . He is on out patient physical therapy 2 days a week.\par CAD CABG (2020)\par He had two units PRBC transfusion in 2019 after CVA.\par No known h/o kidney stone/ Prostatism.\par No hematuria/Nocturia.\par Urine out put:Normal amount. Passes urine once at night.On torsemide.\par Has no h/o Pneumonia / UTI.\par Has had CAD- one stent in . Done at [facility] ([place]) Again in , CABG .\par No known h/o active  PVD/DVT/neoplasia/active infections/bleeding.\par Most recent hospitalization/for: 2020 for CABG . \par Past surgeries:CABG . Has tunneled catheter on left side.\par No history of kidney/ bladder/ prostate surgery.\par Non smoker.\par Fam: Parents are  . Father - at 80 years, tongue cancer was a smoker.  Mother- at 71, had accident while crossing road at [facility] near [place] Siblings- 2 brothers and one sister.\par Children: 2 sons, One son is cardiology fellow at [employer], will move to [employer] soon as attending. Chayito Castelan . Another is nursing  at [employer].\par No family history of kidney disease.\par Independent for ADL. Not working since CVA in 2019.\par Lives with his wife, Rigo Norton and son Chris. His son Chayito is a cardiology fellow at [employer].\par Able to walk 5-6 blocks, uses walker when walking outside not inside the house.\par ROS: Has no h/o shortness of breath on exertion.\par Functional/employment status:Not working now.\par Dialysis history:On hemodialysis at [facility] dialysis unit.\par Kidney Biopsy: None\par Potential Live donors:None at present\par \par Prior Studies:\par Cardiology: had CABG, has had follow up with Dr. Maloney.\par Cancer Screen:Colonoscopy , PSA 2017- ok\par Consultants:Berenice Bose, Dr. He, Dr. Lincoln\par Primary MD: Pedro Corral\par \par Prior Transplants: None\par \par Current Meds:\par Coreg 3.125 X2/d\par Eliquis 2. 5 mg X2/d\par Amlodipine 5/d (has been recently discontinued)\par Keppra 250 mg x2/d, 125 post HD (following CVA, followed by neurologist at [facility] ). Last seizure after CABG when he missed getting keppra as per son.\par Aspirin 81\par Torsemide 40 mg/d\par Novolog pre meals 3+ SS\par Levimir 5 units/d\par \par \par \par \par

## 2020-11-25 NOTE — ASSESSMENT
[FreeTextEntry1] : .Mr. DUNBAR 64 year He is evaluated for kidney transplantation.\par Pre transplant/ESRD: Patient had significant events since last visit including CVA, CABG, now appears to be recovering well.prior to being active on the list he needs the following:\par 1. In person visit to office to consult surgeon and nephrologist\par 2. Cardiology evaluation\par 3. Updated serology, PRA\par \par Medical risks: Cardiovascular, cancer screening.\par Diabetes Mellitus: Discussed implications. Continue follow up with endocrinologist\par Hypertension: Discussed implications. Continue follow up with primary physicians.\par Cardiac risk:  Reviewed. Will follow up with cardiologist prior to transplant\par Cancer screening: PSA.  Colon abigail screening. No known h/o neoplastic disease\par ID: Serology for acute and chronic viral infections.  .- update, reports no COVID exposure/disease\par Imaging: Renal/abdominal /chest /Iliac  imaging update as per protocol\par Consults: Nutrition, social work, cardiology, Transplant surgery.\par Reviewed factors affecting survival and morbidity while on wait list and reviewed zackery-operative and long-term risk factors affecting outcome in kidney transplantation.\par Details of transplant surgery, immunosuppression and its complications and benefits of live donor transplantation as well as variability in wait times across regions and multiple listing were discussed. KDPI >85% and PHS high risk criteria donors were discussed. Discussed factors affecting morbidity and mortality while on hemodialysis.\par Patient has potential live donor (none ) at present. \par Will proceed with updating work up and in person visit prior to activating on list for transplant  once work up is reviewed and found to be ok.\par

## 2020-11-25 NOTE — HISTORY OF PRESENT ILLNESS
[FreeTextEntry1] : 64 years old  male, living in  since .\par Patient has known CKD (), ESRD on hemodialysis (2019- [facility] dialysis unit in [place]) on follow up with Dr. Waters is here by [facility] for pre kidney transplant evaluation. \par He has known DM () On insulin (); HTN (). CAD stent (2019) 2019 again had pos stress test  CVA (Dec 2019, just as he completed dialysis), had weakness of right leg and arm, was in rehab was in A [facility] and now at home. He had PEG post CVA for 4 weeks and stopped using it in 2020 ,  was removed in . He is on out patient physical therapy 2 days a week.\par CAD CABG (2020)\par He had two units PRBC transfusion in 2019 after CVA.\par No known h/o kidney stone/ Prostatism.\par No hematuria/Nocturia.\par Urine out put:Normal amount. Passes urine once at night.On torsemide.\par Has no h/o Pneumonia / UTI.\par Has had CAD- one stent in . Done at [facility] ([place]) Again in , CABG .\par No known h/o active  PVD/DVT/neoplasia/active infections/bleeding.\par Most recent hospitalization/for: 2020 for CABG . \par Past surgeries:CABG . Has tunneled catheter on left side.\par No history of kidney/ bladder/ prostate surgery.\par Non smoker.\par Fam: Parents are  . Father - at 80 years, tongue cancer was a smoker.  Mother- at 71, had accident while crossing road at [facility] near [place] Siblings- 2 brothers and one sister.\par Children: 2 sons, One son is cardiology fellow at [employer], will move to [place] soon as attending. Chayito Castelan . Another is nursing  at [place],  Chris Castelan.\par No family history of kidney disease.\par Independent for ADL. Not working since CVA in 2019.\par Lives with his wife, Rigo Norton and son Chris. \par Able to walk 5-6 blocks, uses walker when walking outside not inside the house.\par ROS: Has no h/o shortness of breath on exertion.\par Functional/employment status:Not working now.\par Dialysis history:On hemodialysis at [facility] dialysis unit.\par Kidney Biopsy: None\par Potential Live donors:None at present\par \par Prior Studies:\par Cardiology: had CABG, has follow up with Dr. Maloney.\par Cancer Screen:Colonoscopy , PSA 2017- ok\par Consultants:Berenice Bose, Dr. He, Dr. Lincoln\par Primary MD: Pedro Corral\par \par Prior Transplants: None\par \par Current Meds:\par Coreg 3.125 X2/d\par Eliquis 5 mg X2/d\par Amlodipine 5/d\par Keppra 250 mg x2/d, 125 post HD (following CVA, followed by neurologist at [facility] ). Last seizure after CABG when he missed getting keppra as per son.\par Aspirin 81\par Torsemide 40 mg/d\par Novolog pre meals 3+ SS\par Levimir 5 units/d\par \par \par \par \par

## 2020-11-25 NOTE — ASSESSMENT
[FreeTextEntry1] : .Mr. DUNBAR 64 year He is evaluated for kidney transplantation.\par Pre transplant/ESRD: Patient had significant events since last visit including CVA, CABG, now appears to have recovered well. He is receiving 2/week physical therapy.\par Updated serology and PRA\par Medical risks: Cardiovascular, cancer screening.\par Diabetes Mellitus: Discussed implications. Continue follow up with endocrinologist\par Hypertension: Discussed implications. Continue follow up with primary physicians.\par Cardiac risk:  Reviewed. Will follow up with cardiologist prior to transplant\par Cancer screening: PSA Colon abigail screening as per protocol. No known h/o neoplastic disease\par ID: Serology for acute and chronic viral infections.  .- update, reports no COVID exposure/disease\par Imaging: Renal/abdominal /chest /Iliac  imaging update as per protocol\par Consults: Nutrition, social work, cardiology, Transplant surgery.\par Reviewed factors affecting survival and morbidity while on wait list and reviewed zcakery-operative and long-term risk factors affecting outcome in kidney transplantation.\par Details of transplant surgery, immunosuppression and its complications and benefits of live donor transplantation as well as variability in wait times across regions and multiple listing were discussed. KDPI >85% and PHS high risk criteria donors were discussed. Discussed factors affecting morbidity and mortality while on hemodialysis.\par Patient has potential live donor (none ) at present. \par Will proceed with updating work up and in person visit prior to activating on list for transplant  once work up is reviewed and found to be ok.\par

## 2020-12-03 ENCOUNTER — OUTPATIENT (OUTPATIENT)
Dept: OUTPATIENT SERVICES | Facility: HOSPITAL | Age: 65
LOS: 1 days | End: 2020-12-03
Payer: COMMERCIAL

## 2020-12-03 ENCOUNTER — APPOINTMENT (OUTPATIENT)
Dept: ULTRASOUND IMAGING | Facility: IMAGING CENTER | Age: 65
End: 2020-12-03
Payer: COMMERCIAL

## 2020-12-03 DIAGNOSIS — Z95.1 PRESENCE OF AORTOCORONARY BYPASS GRAFT: Chronic | ICD-10-CM

## 2020-12-03 DIAGNOSIS — Z76.82 AWAITING ORGAN TRANSPLANT STATUS: ICD-10-CM

## 2020-12-03 DIAGNOSIS — Z00.8 ENCOUNTER FOR OTHER GENERAL EXAMINATION: ICD-10-CM

## 2020-12-03 PROCEDURE — 93979 VASCULAR STUDY: CPT

## 2020-12-03 PROCEDURE — 93979 VASCULAR STUDY: CPT | Mod: 26

## 2020-12-04 RX ORDER — LEVOTHYROXINE SODIUM 50 UG/1
50 TABLET ORAL
Refills: 0 | Status: DISCONTINUED | COMMUNITY
Start: 2017-03-10 | End: 2020-12-04

## 2020-12-09 ENCOUNTER — APPOINTMENT (OUTPATIENT)
Dept: INFECTIOUS DISEASE | Facility: CLINIC | Age: 65
End: 2020-12-09
Payer: MEDICARE

## 2020-12-09 VITALS
BODY MASS INDEX: 20.04 KG/M2 | TEMPERATURE: 98.5 F | DIASTOLIC BLOOD PRESSURE: 70 MMHG | OXYGEN SATURATION: 100 % | WEIGHT: 140 LBS | HEART RATE: 59 BPM | SYSTOLIC BLOOD PRESSURE: 175 MMHG | HEIGHT: 70 IN

## 2020-12-09 PROCEDURE — 99214 OFFICE O/P EST MOD 30 MIN: CPT

## 2020-12-10 ENCOUNTER — NON-APPOINTMENT (OUTPATIENT)
Age: 65
End: 2020-12-10

## 2020-12-10 NOTE — HISTORY OF PRESENT ILLNESS
[FreeTextEntry1] : 65 M ESRD on HD MWF, CAD s/p stents, IDDMII, hospital course in December 2019 surrounding CVA c/b status epilepticus requiring intubation and PEG placement s/p removal who presented to ID office for followup. Of note, admitted 8/7/20 - 8/19/20 for CABG. Noted to have outpatient dialysis center PPD which was positive. CT Chest (8/8) with perifissural opacties (stable from prior). Patient had 3x sputum samples obtained for AFB which were smear negative. AFB sputum cultures with no growth at 6 weeks. Started on INH/B6 on 10/14/20\par \par Patient denies chills or fevers. Denies cough or SOB. Denies weight loss. Taking 100% of INH/B6 doses. denies side effects

## 2020-12-11 LAB
ALBUMIN SERPL ELPH-MCNC: 4.1 G/DL
ALP BLD-CCNC: 84 U/L
ALT SERPL-CCNC: 9 U/L
ANION GAP SERPL CALC-SCNC: 22 MMOL/L
AST SERPL-CCNC: 30 U/L
BASOPHILS # BLD AUTO: 0.03 K/UL
BASOPHILS NFR BLD AUTO: 0.5 %
BILIRUB SERPL-MCNC: 0.2 MG/DL
BUN SERPL-MCNC: 96 MG/DL
CALCIUM SERPL-MCNC: 9 MG/DL
CHLORIDE SERPL-SCNC: 94 MMOL/L
CO2 SERPL-SCNC: 19 MMOL/L
CREAT SERPL-MCNC: 6.96 MG/DL
EOSINOPHIL # BLD AUTO: 0.52 K/UL
EOSINOPHIL NFR BLD AUTO: 8.9 %
GLUCOSE SERPL-MCNC: 123 MG/DL
HCT VFR BLD CALC: 35.8 %
HGB BLD-MCNC: 11.3 G/DL
IMM GRANULOCYTES NFR BLD AUTO: 0.2 %
LYMPHOCYTES # BLD AUTO: 1.63 K/UL
LYMPHOCYTES NFR BLD AUTO: 27.8 %
MAN DIFF?: NORMAL
MCHC RBC-ENTMCNC: 27.8 PG
MCHC RBC-ENTMCNC: 31.6 GM/DL
MCV RBC AUTO: 88 FL
MONOCYTES # BLD AUTO: 0.45 K/UL
MONOCYTES NFR BLD AUTO: 7.7 %
NEUTROPHILS # BLD AUTO: 3.23 K/UL
NEUTROPHILS NFR BLD AUTO: 54.9 %
PLATELET # BLD AUTO: 225 K/UL
POTASSIUM SERPL-SCNC: 7.1 MMOL/L
PROT SERPL-MCNC: 8 G/DL
RBC # BLD: 4.07 M/UL
RBC # FLD: 16.7 %
SODIUM SERPL-SCNC: 134 MMOL/L
WBC # FLD AUTO: 5.87 K/UL

## 2020-12-28 ENCOUNTER — APPOINTMENT (OUTPATIENT)
Dept: ENDOCRINOLOGY | Facility: CLINIC | Age: 65
End: 2020-12-28
Payer: MEDICARE

## 2020-12-28 VITALS
HEIGHT: 70 IN | HEART RATE: 59 BPM | BODY MASS INDEX: 19.82 KG/M2 | OXYGEN SATURATION: 97 % | SYSTOLIC BLOOD PRESSURE: 118 MMHG | WEIGHT: 138.45 LBS | TEMPERATURE: 98.7 F | DIASTOLIC BLOOD PRESSURE: 74 MMHG

## 2020-12-28 LAB
GLUCOSE BLDC GLUCOMTR-MCNC: 143
HBA1C MFR BLD HPLC: 7.1

## 2020-12-28 PROCEDURE — 82962 GLUCOSE BLOOD TEST: CPT

## 2020-12-28 PROCEDURE — 99214 OFFICE O/P EST MOD 30 MIN: CPT | Mod: 25

## 2020-12-28 PROCEDURE — 83036 HEMOGLOBIN GLYCOSYLATED A1C: CPT | Mod: QW

## 2020-12-28 RX ORDER — FLASH GLUCOSE SENSOR
KIT MISCELLANEOUS
Qty: 2 | Refills: 5 | Status: ACTIVE | COMMUNITY
Start: 2020-12-28 | End: 1900-01-01

## 2020-12-28 NOTE — ASSESSMENT
[FreeTextEntry1] : 64 year old male with recent history of CABG, ESRD ( on renal transplant list), DM Type 2 requiring insulin, hypothyroidism here for follow up of DM Type 2 \par \par DM Type 2 ( HgA1C of 6.6%)\par -Off of Levemir \par -Humalog 3-4 units before meals \par -SS # 1 \par -Advised to cut back on high carb snacks \par -Will order eric for the patient to be able to manage glycemic variation \par -Hypoglcemia treatment discussed \par \par History of CABG\par -Goal HgA1C of 7%\par \par ESRD\par -Increases risk for hypoglycemia\par -Treatment has been discussed \par \par Hypothyroidism\par -Check TFTs \par -Continue Levothyroxine 50 mcg daily \par \par -Follow up in 4 months. \par

## 2020-12-28 NOTE — HISTORY OF PRESENT ILLNESS
[FreeTextEntry1] : Diabetes Follow-up Patient HPI\par \par Stent placement last June ( Dr Morejon) \par 08/2019: Stroke and was in rehab ( previously was COVID positive) \par He reported that CABG was recent 08/14/2020 \par Currently he is on a transplant renal \par Dialysis for the past year (M, W, F) \par \par \par Patient was in the hospital in 11/2020 at [facility] and primarily because of elevated potassium and tachycardia \par \par \par CC\par Patient referred by Dr. Corral for diabetes management.\par \par \par HPI:\par \par Duration of Diabetes: 30 years \par Is patient on Insulin? 10 years \par If yes, how long on insulin? \par \par List Current Medications for Glycemic control and the doses:\par 1- Off of Levemir \par 2- Novolog 3-4 units TID with meals ( SS # 1 ) \par 3- \par \par SMBG (self monitored blood glucose) readings: \par - Name of glucometer: \par - How often does the patient check BG? \par - Does the patient keep a log? \par \par If detailed record is available, what is the range of most of the BG readings?\par - Before Breakfast: \par - Before Lunch: 100-140\par Before dialysis: 130-140\par - Before Dinner: 140-160\par - Before Bedtime: 160-180\par \par \par \par Does patient get Hypoglycemic episodes? None recently \par If yes how frequent? \par Hoe low do the BG readings reach? 60s \par When do most of those episodes occur? In the middle of night \par What symptoms does the patient get during those episodes? Hungry, sweating, heart palpitations \par \par Diabetic Complications: Is patient aware of having any of those complications?\par - Eyes: Retinopathy? Retinopathy- laser treatement \par  	When was the last fully dilated eye exam? Last year ( 02/2020) - Dr. Verdin \par - Feet: 	Neuropathy? No  \par  	Foot Ulcers? No \par 	When was the last time patient saw a Podiatrist? No \par - Kidneys: Nephropathy? ESRD \par  \par \par \par Very careful with potasssium intake \par \par Diet: review patient's diet: \par Breakfast: Eggs, tea, white bread ( 2 slice), oatmeal, roti \par Lunch: 2 rotis, nikunj, vegetables \par Dinner: 1 roti, chicken, soup\par Snacks: Cheese, cookie with tea \par Juice or soda: Diet sprite \par Desserts: Ice cream \par \par \par Exercise: review patient exercise habits: Walking x 15 mins \par \par Symptoms: Write any symptoms, concerns and issues bothering the patient which have not be addressed above: \par No blurry vision\par \par

## 2021-01-01 RX ORDER — INSULIN ASPART 100 [IU]/ML
100 INJECTION, SOLUTION INTRAVENOUS; SUBCUTANEOUS
Qty: 3 | Refills: 5 | Status: ACTIVE | COMMUNITY
Start: 2021-01-01 | End: 1900-01-01

## 2021-01-04 LAB
ALBUMIN SERPL ELPH-MCNC: 4.3 G/DL
ALP BLD-CCNC: 83 U/L
ALT SERPL-CCNC: 8 U/L
ANION GAP SERPL CALC-SCNC: 13 MMOL/L
AST SERPL-CCNC: 13 U/L
BILIRUB SERPL-MCNC: 0.3 MG/DL
BUN SERPL-MCNC: 48 MG/DL
CALCIUM SERPL-MCNC: 9.5 MG/DL
CHLORIDE SERPL-SCNC: 93 MMOL/L
CHOLEST SERPL-MCNC: 130 MG/DL
CO2 SERPL-SCNC: 28 MMOL/L
CREAT SERPL-MCNC: 5.94 MG/DL
GLUCOSE SERPL-MCNC: 146 MG/DL
HDLC SERPL-MCNC: 66 MG/DL
LDLC SERPL CALC-MCNC: 55 MG/DL
NONHDLC SERPL-MCNC: 64 MG/DL
POTASSIUM SERPL-SCNC: 5.7 MMOL/L
PROT SERPL-MCNC: 7.9 G/DL
SODIUM SERPL-SCNC: 134 MMOL/L
T4 FREE SERPL-MCNC: 1.2 NG/DL
TRIGL SERPL-MCNC: 45 MG/DL
TSH SERPL-ACNC: 2.08 UIU/ML

## 2021-01-06 ENCOUNTER — APPOINTMENT (OUTPATIENT)
Dept: INFECTIOUS DISEASE | Facility: CLINIC | Age: 66
End: 2021-01-06
Payer: MEDICARE

## 2021-01-06 VITALS
TEMPERATURE: 98.2 F | DIASTOLIC BLOOD PRESSURE: 85 MMHG | SYSTOLIC BLOOD PRESSURE: 189 MMHG | BODY MASS INDEX: 19.63 KG/M2 | HEIGHT: 70 IN | OXYGEN SATURATION: 99 % | HEART RATE: 65 BPM | WEIGHT: 137.13 LBS

## 2021-01-06 PROCEDURE — 99214 OFFICE O/P EST MOD 30 MIN: CPT

## 2021-01-07 NOTE — ASSESSMENT
[FreeTextEntry1] : 64 M ESRD on HD MWF, CAD s/p stents, IDDMII, hospital course in December 2019 surrounding CVA c/b status epilepticus requiring intubation and PEG placement s/p removal who presented to ID office for followup. \par \par Admitted 8/7/20 - 8/19/20 for CABG. \par Noted to have outpatient dialysis center PPD which was positive. \par In House Quantiferon Negative\par CT Chest (8/8) with perifissural opacties (stable from prior). \par Patient had 3x sputum samples obtained for AFB which were smear negative. \par AFB sputum cultures with no growth at 6 weeks. \par \par Patient's nephew had TB and patient from endemic area ([place]) \par While Quantiferon negative given positive PPD and given pulmonary nodules I would treat for latent TB\par Started on INH/B6 on 10/14/20\par Will check CBC with diff and CMP monthly\par \par #Latent TB\par --Check CBC and CMP (provided script to obtain labs in 1 month and in 2 months)\par --Continue INH/B6 x 9 months\par --No absolute ID contraindication for renal transplant\par \par #Pre-Renal Transplant, Positive Strongyloides Antibody\par --No absolute ID contraindication for renal transplant \par --Strongyloides antibody positive - s/p Ivermectin \par \par #Encounter to Vaccinate patient\par --Appears to be UTD for pre-transplant vaccines\par --Received Influenza vaccine in 10/2020\par \par Followup: 3 month

## 2021-01-09 RX ORDER — FLASH GLUCOSE SENSOR
KIT MISCELLANEOUS
Qty: 2 | Refills: 5 | Status: DISCONTINUED | COMMUNITY
Start: 2020-09-08 | End: 2021-01-09

## 2021-01-09 RX ORDER — FLASH GLUCOSE SCANNING READER
EACH MISCELLANEOUS
Qty: 1 | Refills: 1 | Status: DISCONTINUED | COMMUNITY
Start: 2020-09-08 | End: 2021-01-09

## 2021-01-10 LAB
ALBUMIN SERPL ELPH-MCNC: 4.4 G/DL
ALP BLD-CCNC: 79 U/L
ALT SERPL-CCNC: <5 U/L
ANION GAP SERPL CALC-SCNC: 19 MMOL/L
AST SERPL-CCNC: 13 U/L
BASOPHILS # BLD AUTO: 0.04 K/UL
BASOPHILS NFR BLD AUTO: 0.6 %
BILIRUB SERPL-MCNC: 0.3 MG/DL
BUN SERPL-MCNC: 93 MG/DL
CALCIUM SERPL-MCNC: 9.8 MG/DL
CHLORIDE SERPL-SCNC: 91 MMOL/L
CO2 SERPL-SCNC: 22 MMOL/L
CREAT SERPL-MCNC: 9.37 MG/DL
EOSINOPHIL # BLD AUTO: 0.32 K/UL
EOSINOPHIL NFR BLD AUTO: 4.8 %
GLUCOSE SERPL-MCNC: 221 MG/DL
HCT VFR BLD CALC: 35.3 %
HGB BLD-MCNC: 10.8 G/DL
IMM GRANULOCYTES NFR BLD AUTO: 0.2 %
LYMPHOCYTES # BLD AUTO: 1.37 K/UL
LYMPHOCYTES NFR BLD AUTO: 20.6 %
MAN DIFF?: NORMAL
MCHC RBC-ENTMCNC: 27.4 PG
MCHC RBC-ENTMCNC: 30.6 GM/DL
MCV RBC AUTO: 89.6 FL
MONOCYTES # BLD AUTO: 0.54 K/UL
MONOCYTES NFR BLD AUTO: 8.1 %
NEUTROPHILS # BLD AUTO: 4.38 K/UL
NEUTROPHILS NFR BLD AUTO: 65.7 %
PLATELET # BLD AUTO: 271 K/UL
POTASSIUM SERPL-SCNC: 5.7 MMOL/L
PROT SERPL-MCNC: 7.7 G/DL
RBC # BLD: 3.94 M/UL
RBC # FLD: 18.9 %
SODIUM SERPL-SCNC: 132 MMOL/L
WBC # FLD AUTO: 6.66 K/UL

## 2021-01-20 ENCOUNTER — NON-APPOINTMENT (OUTPATIENT)
Age: 66
End: 2021-01-20

## 2021-01-20 ENCOUNTER — APPOINTMENT (OUTPATIENT)
Dept: CARDIOLOGY | Facility: CLINIC | Age: 66
End: 2021-01-20
Payer: MEDICARE

## 2021-01-20 VITALS
RESPIRATION RATE: 12 BRPM | WEIGHT: 137.57 LBS | TEMPERATURE: 97.1 F | HEIGHT: 70 IN | DIASTOLIC BLOOD PRESSURE: 80 MMHG | BODY MASS INDEX: 19.69 KG/M2 | OXYGEN SATURATION: 97 % | HEART RATE: 67 BPM | SYSTOLIC BLOOD PRESSURE: 193 MMHG

## 2021-01-20 VITALS — DIASTOLIC BLOOD PRESSURE: 78 MMHG | SYSTOLIC BLOOD PRESSURE: 140 MMHG

## 2021-01-20 PROCEDURE — 93000 ELECTROCARDIOGRAM COMPLETE: CPT

## 2021-01-20 PROCEDURE — 99214 OFFICE O/P EST MOD 30 MIN: CPT

## 2021-01-20 NOTE — HISTORY OF PRESENT ILLNESS
[FreeTextEntry1] : Srinivasan is getting stronger and now trying to balance on one leg in PT. Being treated with INH and B6 for +PPD. Has very high Ab so must have picked up COVID at dialysis although asymptomatic. Amlodipine restarted. Here today with his son.

## 2021-01-20 NOTE — DISCUSSION/SUMMARY
[___ Month(s)] : [unfilled] month(s) [FreeTextEntry1] : The patient is a 65-year-old gentleman diabetes mellitus, hypertension, hyperlipidemia, hypothyroidism, ESRD, coronary artery disease s/p stroke, afib, s/p 3V CABG who has COVID antibodies but no evidence of illness..\par #1 CAD- s/p CABG, euvolemic, no angina or HF, continue PT\par #2 Htn-  coreg 6.25mg bid, telmesartan 20mg (was 80) and amlodipine 5mg, son monitors closely\par #3 ESRD- on dialysis,  There are no cardiac contraindications to proceeding with renal transplant\par #4 DM- on insulin, good control\par #5 Afib- on eliquis, no bleeding\par #6 Neuro- on keppra\par #7 Hypothyroid- on levothyroxine 50mcg\par #8 ID- prophylactic treatment for latent Tb for six months\par \par

## 2021-01-22 ENCOUNTER — NON-APPOINTMENT (OUTPATIENT)
Age: 66
End: 2021-01-22

## 2021-02-04 ENCOUNTER — APPOINTMENT (OUTPATIENT)
Dept: INFECTIOUS DISEASE | Facility: CLINIC | Age: 66
End: 2021-02-04

## 2021-02-08 LAB
ALBUMIN SERPL ELPH-MCNC: 4.3 G/DL
ALP BLD-CCNC: 87 U/L
ALT SERPL-CCNC: 7 U/L
ANION GAP SERPL CALC-SCNC: 17 MMOL/L
AST SERPL-CCNC: 16 U/L
BASOPHILS # BLD AUTO: 0.04 K/UL
BASOPHILS NFR BLD AUTO: 0.6 %
BILIRUB SERPL-MCNC: 0.3 MG/DL
BUN SERPL-MCNC: 50 MG/DL
CALCIUM SERPL-MCNC: 9.4 MG/DL
CHLORIDE SERPL-SCNC: 91 MMOL/L
CO2 SERPL-SCNC: 25 MMOL/L
CREAT SERPL-MCNC: 6.14 MG/DL
EOSINOPHIL # BLD AUTO: 0.38 K/UL
EOSINOPHIL NFR BLD AUTO: 5.7 %
GLUCOSE SERPL-MCNC: 237 MG/DL
HCT VFR BLD CALC: 37.7 %
HGB BLD-MCNC: 11.5 G/DL
IMM GRANULOCYTES NFR BLD AUTO: 0.3 %
LYMPHOCYTES # BLD AUTO: 1.77 K/UL
LYMPHOCYTES NFR BLD AUTO: 26.7 %
MAN DIFF?: NORMAL
MCHC RBC-ENTMCNC: 29 PG
MCHC RBC-ENTMCNC: 30.5 GM/DL
MCV RBC AUTO: 95.2 FL
MONOCYTES # BLD AUTO: 0.38 K/UL
MONOCYTES NFR BLD AUTO: 5.7 %
NEUTROPHILS # BLD AUTO: 4.04 K/UL
NEUTROPHILS NFR BLD AUTO: 61 %
PLATELET # BLD AUTO: 254 K/UL
POTASSIUM SERPL-SCNC: 4.9 MMOL/L
PROT SERPL-MCNC: 8 G/DL
RBC # BLD: 3.96 M/UL
RBC # FLD: 19 %
SODIUM SERPL-SCNC: 133 MMOL/L
WBC # FLD AUTO: 6.63 K/UL

## 2021-02-23 NOTE — PHYSICAL THERAPY INITIAL EVALUATION ADULT - ADDITIONAL COMMENTS
Neck , no lymphadenopathy
Pt reports living in private house with wife and son. 5 steps to enter with handrail. PTA pt ambulates independently with straight cane or rolling walker (no device walking between rooms in house). Pt receives assistance from wife for showing, cooking, shopping. Owns straight cane, rolling walker and shower chair.

## 2021-03-09 LAB
ALBUMIN SERPL ELPH-MCNC: 4.3 G/DL
ALP BLD-CCNC: 79 U/L
ALT SERPL-CCNC: 6 U/L
ANION GAP SERPL CALC-SCNC: 11 MMOL/L
AST SERPL-CCNC: 15 U/L
BASOPHILS # BLD AUTO: 0.03 K/UL
BASOPHILS NFR BLD AUTO: 0.4 %
BILIRUB SERPL-MCNC: 0.2 MG/DL
BUN SERPL-MCNC: 37 MG/DL
CALCIUM SERPL-MCNC: 9.3 MG/DL
CHLORIDE SERPL-SCNC: 94 MMOL/L
CO2 SERPL-SCNC: 29 MMOL/L
CREAT SERPL-MCNC: 5.69 MG/DL
EOSINOPHIL # BLD AUTO: 0.33 K/UL
EOSINOPHIL NFR BLD AUTO: 4.7 %
GLUCOSE SERPL-MCNC: 151 MG/DL
HCT VFR BLD CALC: 39.7 %
HGB BLD-MCNC: 12.4 G/DL
IMM GRANULOCYTES NFR BLD AUTO: 0.3 %
LYMPHOCYTES # BLD AUTO: 1.71 K/UL
LYMPHOCYTES NFR BLD AUTO: 24.1 %
MAN DIFF?: NORMAL
MCHC RBC-ENTMCNC: 30 PG
MCHC RBC-ENTMCNC: 31.2 GM/DL
MCV RBC AUTO: 95.9 FL
MONOCYTES # BLD AUTO: 0.55 K/UL
MONOCYTES NFR BLD AUTO: 7.8 %
NEUTROPHILS # BLD AUTO: 4.45 K/UL
NEUTROPHILS NFR BLD AUTO: 62.7 %
PLATELET # BLD AUTO: 237 K/UL
POTASSIUM SERPL-SCNC: 6.1 MMOL/L
PROT SERPL-MCNC: 7.6 G/DL
RBC # BLD: 4.14 M/UL
RBC # FLD: 15.2 %
SODIUM SERPL-SCNC: 134 MMOL/L
WBC # FLD AUTO: 7.09 K/UL

## 2021-04-01 ENCOUNTER — NON-APPOINTMENT (OUTPATIENT)
Age: 66
End: 2021-04-01

## 2021-04-01 ENCOUNTER — APPOINTMENT (OUTPATIENT)
Dept: CARDIOLOGY | Facility: CLINIC | Age: 66
End: 2021-04-01
Payer: MEDICARE

## 2021-04-01 VITALS
OXYGEN SATURATION: 99 % | WEIGHT: 140 LBS | DIASTOLIC BLOOD PRESSURE: 55 MMHG | SYSTOLIC BLOOD PRESSURE: 150 MMHG | BODY MASS INDEX: 20.04 KG/M2 | HEART RATE: 58 BPM | TEMPERATURE: 96.9 F | HEIGHT: 70 IN

## 2021-04-01 PROCEDURE — 99214 OFFICE O/P EST MOD 30 MIN: CPT

## 2021-04-01 NOTE — DISCUSSION/SUMMARY
[___ Month(s)] : [unfilled] month(s) [FreeTextEntry1] : The patient is a 65-year-old gentleman diabetes mellitus, hypertension, hyperlipidemia, hypothyroidism, ESRD, coronary artery disease s/p stroke, afib, s/p 3V CABG, who is getting stronger.\par #1 CAD- s/p CABG, euvolemic, no angina or HF, continue PT\par #2 Htn- continue coreg 6.25mg bid, telmesartan 20mg (was 80) and amlodipine 5mg, son monitors closely\par #3 ESRD- on dialysis,  There are no cardiac contraindications to proceeding with renal transplant, hold on fistula pending May visit with transplant team\par #4 DM- on insulin, low numbers\par #5 Afib- continue eliquis, no bleeding\par #6 Neuro- on keppra\par #7 Hypothyroid- continue levothyroxine 50mcg\par #8 ID- prophylactic treatment for latent Tb for six months, received both COVID vaccines\par \par

## 2021-04-01 NOTE — HISTORY OF PRESENT ILLNESS
[FreeTextEntry1] : Srinivasan is getting stronger and now trying to balance on one leg in PT. Being treated with INH and B6 for +PPD. Recommended to have AV fistula by dialysis center but transplant service said it would be less than a year until transplant. Here today with his son.

## 2021-04-01 NOTE — PHYSICAL EXAM
[General Appearance - Well Developed] : well developed [Normal Appearance] : normal appearance [Well Groomed] : well groomed [General Appearance - Well Nourished] : well nourished [General Appearance - In No Acute Distress] : no acute distress [No Deformities] : no deformities [Normal Conjunctiva] : the conjunctiva exhibited no abnormalities [Eyelids - No Xanthelasma] : the eyelids demonstrated no xanthelasmas [Normal Oral Mucosa] : normal oral mucosa [No Oral Pallor] : no oral pallor [No Oral Cyanosis] : no oral cyanosis [Normal Jugular Venous A Waves Present] : normal jugular venous A waves present [Normal Jugular Venous V Waves Present] : normal jugular venous V waves present [No Jugular Venous Rodriguez A Waves] : no jugular venous rodriguez A waves [Respiration, Rhythm And Depth] : normal respiratory rhythm and effort [Exaggerated Use Of Accessory Muscles For Inspiration] : no accessory muscle use [Auscultation Breath Sounds / Voice Sounds] : lungs were clear to auscultation bilaterally [Heart Rate And Rhythm] : heart rate and rhythm were normal [Heart Sounds] : normal S1 and S2 [Murmurs] : no murmurs present [Abdomen Soft] : soft [Abdomen Tenderness] : non-tender [Abdomen Mass (___ Cm)] : no abdominal mass palpated [Abnormal Walk] : normal gait [Gait - Sufficient For Exercise Testing] : the gait was sufficient for exercise testing [Nail Clubbing] : no clubbing of the fingernails [Cyanosis, Localized] : no localized cyanosis [Petechial Hemorrhages (___cm)] : no petechial hemorrhages [Skin Color & Pigmentation] : normal skin color and pigmentation [] : no rash [No Venous Stasis] : no venous stasis [Skin Lesions] : no skin lesions [No Skin Ulcers] : no skin ulcer [No Xanthoma] : no  xanthoma was observed [Oriented To Time, Place, And Person] : oriented to person, place, and time [Affect] : the affect was normal [Mood] : the mood was normal [No Anxiety] : not feeling anxious [FreeTextEntry1] : midsternal wound healing well

## 2021-04-15 NOTE — PATIENT PROFILE ADULT - NSPROHMDIABETBLDGLCTST_GEN_A_NUR
----- Message from Pari Ruth sent at 4/14/2021 12:34 PM EDT -----  Regarding: OA  Contact: [phone]    ----- Message -----  From: Ramón Johnston RegSched Rep  Sent: 4/14/2021  11:46 AM EDT  To: [facility]    Pt needs to schedule for a colonoscopy. [phone]       3 times/day

## 2021-05-06 ENCOUNTER — APPOINTMENT (OUTPATIENT)
Dept: ENDOCRINOLOGY | Facility: CLINIC | Age: 66
End: 2021-05-06
Payer: MEDICARE

## 2021-05-06 VITALS
OXYGEN SATURATION: 57 % | DIASTOLIC BLOOD PRESSURE: 68 MMHG | TEMPERATURE: 98.1 F | HEIGHT: 70 IN | WEIGHT: 140 LBS | BODY MASS INDEX: 20.04 KG/M2 | SYSTOLIC BLOOD PRESSURE: 130 MMHG | HEART RATE: 98 BPM

## 2021-05-06 LAB
GLUCOSE BLDC GLUCOMTR-MCNC: 129
HBA1C MFR BLD HPLC: 6.5

## 2021-05-06 PROCEDURE — 99214 OFFICE O/P EST MOD 30 MIN: CPT | Mod: 25

## 2021-05-06 PROCEDURE — 83036 HEMOGLOBIN GLYCOSYLATED A1C: CPT | Mod: QW

## 2021-05-06 PROCEDURE — 82962 GLUCOSE BLOOD TEST: CPT

## 2021-05-06 RX ORDER — TELMISARTAN 20 MG/1
20 TABLET ORAL
Qty: 90 | Refills: 3 | Status: DISCONTINUED | COMMUNITY
Start: 2020-10-01 | End: 2021-05-06

## 2021-05-06 NOTE — HISTORY OF PRESENT ILLNESS
[FreeTextEntry1] : Diabetes Follow-up Patient HPI\par \par Stent placement last June ( Dr Morejon) \par 08/2019: Stroke and was in rehab ( previously was COVID positive) \par He reported that CABG was recent 08/14/2020 \par Currently he is on a transplant renal \par Dialysis for the past year (M, W, F) \par \par \par Patient was in the hospital in 11/2020 at [facility] and primarily because of elevated potassium and tachycardia \par \par \par CC\par Patient referred by Dr. Corral for diabetes management.\par \par \par HPI:\par \par Duration of Diabetes: 30 years \par Is patient on Insulin? 10 years \par If yes, how long on insulin? \par \par List Current Medications for Glycemic control and the doses:\par 1- Off of Levemir \par 2- Novolog ( None before breakfast) 3 units before lunch, 1-2 units before dinner ( SS # 1 ) \par 3- \par \par SMBG (self monitored blood glucose) readings: \par - Name of glucometer: \par - How often does the patient check BG? \par - Does the patient keep a log? \par \par If detailed record is available, what is the range of most of the BG readings?\par - Before Breakfast: <100 \par - Before Lunch: 150-160\par After dialysis: 110\par - Before Dinner: 140-160\par - Before Bedtime: 160-180\par \par \par \par Does patient get Hypoglycemic episodes? None recently \par If yes how frequent? \par Hoe low do the BG readings reach? 69-70\par When do most of those episodes occur? In the middle of night \par What symptoms does the patient get during those episodes? Hungry, sweating, heart palpitations \par \par Diabetic Complications: Is patient aware of having any of those complications?\par - Eyes: Retinopathy? Retinopathy- laser treatement \par  	When was the last fully dilated eye exam? Last year ( 02/2020) - Dr. Verdin \par - Feet: 	Neuropathy? No \par  	Foot Ulcers? No \par 	When was the last time patient saw a Podiatrist? No \par - Kidneys: Nephropathy? ESRD \par  \par \par \par Very careful with potasssium intake \par \par Diet: review patient's diet: \par Breakfast: Eggs, tea, white bread ( 2 slice), oatmeal, roti \par Lunch: 2 rotis, nikunj, vegetables \par Dinner: 2 roti, chicken, soup\par Snacks: Cheese, cookie with tea, fruits \par Juice or soda: Diet sprite \par \par \par \par Exercise: review patient exercise habits: Walking x 15 mins \par \par Symptoms: Write any symptoms, concerns and issues bothering the patient which have not be addressed above: \par No blurry vision\par \par

## 2021-05-06 NOTE — PHYSICAL EXAM
[Alert] : alert [Well Nourished] : well nourished [No Acute Distress] : no acute distress [Normal Sclera/Conjunctiva] : normal sclera/conjunctiva [EOMI] : extra ocular movement intact [PERRL] : pupils equal, round and reactive to light [Normal Outer Ear/Nose] : the ears and nose were normal in appearance [Normal Hearing] : hearing was normal [Normal TMs] : both tympanic membranes were normal [No Neck Mass] : no neck mass was observed [Thyroid Not Enlarged] : the thyroid was not enlarged [No Respiratory Distress] : no respiratory distress [Clear to Auscultation] : lungs were clear to auscultation bilaterally [Normal S1, S2] : normal S1 and S2 [Normal Rate] : heart rate was normal [Regular Rhythm] : with a regular rhythm [Normal Bowel Sounds] : normal bowel sounds [Not Tender] : non-tender [Soft] : abdomen soft [No HSM] : no hepato-splenomegaly [Normal Gait] : normal gait [No Clubbing, Cyanosis] : no clubbing  or cyanosis of the fingernails [No Joint Swelling] : no joint swelling seen [Normal Strength/Tone] : muscle strength and tone were normal [No Rash] : no rash [No Skin Lesions] : no skin lesions [No Motor Deficits] : the motor exam was normal [Normal Reflexes] : deep tendon reflexes were 2+ and symmetric [No Tremors] : no tremors [Oriented x3] : oriented to person, place, and time [Normal Affect] : the affect was normal [Normal Insight/Judgement] : insight and judgment were intact [Normal Mood] : the mood was normal

## 2021-05-06 NOTE — ASSESSMENT
[FreeTextEntry1] : 65 year old male with recent history of CABG, ESRD ( on renal transplant list), DM Type 2 requiring insulin, hypothyroidism here for follow up of DM Type 2 \par \par DM Type 2 (HgA1C of 6.6%)\par -Off of Levemir \par -Humalog 1-3 units before meals, advised to hold off on dinner time insulin to prevent am hypoglycemia \par -SS # 1 \par -Advised to cut back on high carb snacks \par -Will order eric for the patient to be able to manage glycemic variation \par -Hypoglcemia treatment discussed \par \par History of CABG\par -Goal HgA1C of 7%\par \par ESRD\par -Increased risk for hypoglycemia\par -Treatment has been discussed \par \par Hypothyroidism\par -Check TFTs \par -Continue Levothyroxine 50 mcg daily \par \par -Follow up in 4 months. \par  \par

## 2021-05-12 ENCOUNTER — APPOINTMENT (OUTPATIENT)
Dept: INFECTIOUS DISEASE | Facility: CLINIC | Age: 66
End: 2021-05-12
Payer: MEDICARE

## 2021-05-12 VITALS
BODY MASS INDEX: 20.04 KG/M2 | WEIGHT: 140 LBS | RESPIRATION RATE: 16 BRPM | HEART RATE: 59 BPM | SYSTOLIC BLOOD PRESSURE: 177 MMHG | TEMPERATURE: 98.9 F | DIASTOLIC BLOOD PRESSURE: 77 MMHG | HEIGHT: 70 IN | OXYGEN SATURATION: 100 %

## 2021-05-12 DIAGNOSIS — Z22.7 LATENT TUBERCULOSIS: ICD-10-CM

## 2021-05-12 DIAGNOSIS — E11.21 TYPE 2 DIABETES MELLITUS WITH DIABETIC NEPHROPATHY: ICD-10-CM

## 2021-05-12 PROCEDURE — 99213 OFFICE O/P EST LOW 20 MIN: CPT

## 2021-05-13 NOTE — ASSESSMENT
[FreeTextEntry1] : 64 M ESRD on HD MWF, CAD s/p stents, IDDMII, hospital course in December 2019 surrounding CVA c/b status epilepticus requiring intubation and PEG placement s/p removal who presented to ID office for followup. \par \par Admitted 8/7/20 - 8/19/20 for CABG. \par Noted to have outpatient dialysis center PPD which was positive. \par In House Quantiferon Negative\par CT Chest (8/8) with perifissural opacties (stable from prior). \par Patient had 3x sputum samples obtained for AFB which were smear negative. \par AFB sputum cultures with no growth at 6 weeks. \par \par Patient's nephew had TB and patient from endemic area ([place]) \par While Quantiferon negative given positive PPD and given pulmonary nodules I would treat for latent TB\par Started on INH/B6 on 10/14/20\par \par #Latent TB\par --Check CBC and CMP (today\par --Continue INH/B6 x 9 months (ending in 7/2021)\par --No absolute ID contraindication for renal transplant\par \par #Pre-Renal Transplant, Positive Strongyloides Antibody\par --No absolute ID contraindication for renal transplant \par --Strongyloides antibody positive - s/p Ivermectin \par \par #Encounter to Vaccinate patient\par --Appears to be UTD for pre-transplant vaccines\par --s/p 2 doses of COVID19 Vaccines\par --Received Influenza vaccine in 10/2020\par \par Followup: PRN

## 2021-05-19 LAB
ALBUMIN SERPL ELPH-MCNC: 4.3 G/DL
ALP BLD-CCNC: 88 U/L
ALT SERPL-CCNC: <5 U/L
ANION GAP SERPL CALC-SCNC: 16 MMOL/L
AST SERPL-CCNC: 12 U/L
BASOPHILS # BLD AUTO: 0.04 K/UL
BASOPHILS NFR BLD AUTO: 0.6 %
BILIRUB SERPL-MCNC: 0.3 MG/DL
BUN SERPL-MCNC: 49 MG/DL
CALCIUM SERPL-MCNC: 9.4 MG/DL
CHLORIDE SERPL-SCNC: 96 MMOL/L
CHOLEST SERPL-MCNC: 110 MG/DL
CO2 SERPL-SCNC: 24 MMOL/L
CREAT SERPL-MCNC: 7.26 MG/DL
EOSINOPHIL # BLD AUTO: 0.32 K/UL
EOSINOPHIL NFR BLD AUTO: 4.8 %
GLUCOSE SERPL-MCNC: 272 MG/DL
HCT VFR BLD CALC: 35.7 %
HDLC SERPL-MCNC: 64 MG/DL
HGB BLD-MCNC: 10.9 G/DL
IMM GRANULOCYTES NFR BLD AUTO: 0.3 %
LDLC SERPL CALC-MCNC: 37 MG/DL
LYMPHOCYTES # BLD AUTO: 1.63 K/UL
LYMPHOCYTES NFR BLD AUTO: 24.7 %
MAN DIFF?: NORMAL
MCHC RBC-ENTMCNC: 29.8 PG
MCHC RBC-ENTMCNC: 30.5 GM/DL
MCV RBC AUTO: 97.5 FL
MONOCYTES # BLD AUTO: 0.51 K/UL
MONOCYTES NFR BLD AUTO: 7.7 %
NEUTROPHILS # BLD AUTO: 4.08 K/UL
NEUTROPHILS NFR BLD AUTO: 61.9 %
NONHDLC SERPL-MCNC: 46 MG/DL
PLATELET # BLD AUTO: 264 K/UL
POTASSIUM SERPL-SCNC: 5.7 MMOL/L
PROT SERPL-MCNC: 7.7 G/DL
RBC # BLD: 3.66 M/UL
RBC # FLD: 16.3 %
SODIUM SERPL-SCNC: 136 MMOL/L
T4 FREE SERPL-MCNC: 1.3 NG/DL
TRIGL SERPL-MCNC: 41 MG/DL
TSH SERPL-ACNC: 3.71 UIU/ML
WBC # FLD AUTO: 6.6 K/UL

## 2021-05-27 ENCOUNTER — APPOINTMENT (OUTPATIENT)
Dept: NEPHROLOGY | Facility: CLINIC | Age: 66
End: 2021-05-27
Payer: MEDICARE

## 2021-05-27 VITALS
SYSTOLIC BLOOD PRESSURE: 160 MMHG | DIASTOLIC BLOOD PRESSURE: 80 MMHG | BODY MASS INDEX: 19.93 KG/M2 | WEIGHT: 138.89 LBS | HEART RATE: 60 BPM | RESPIRATION RATE: 14 BRPM

## 2021-05-27 PROCEDURE — 99072 ADDL SUPL MATRL&STAF TM PHE: CPT

## 2021-05-27 PROCEDURE — 99215 OFFICE O/P EST HI 40 MIN: CPT

## 2021-05-28 NOTE — PHYSICAL EXAM
[General Appearance - Alert] : alert [General Appearance - In No Acute Distress] : in no acute distress [Sclera] : the sclera and conjunctiva were normal [Extraocular Movements] : extraocular movements were intact [Jugular Venous Distention Increased] : there was no jugular-venous distention [Auscultation Breath Sounds / Voice Sounds] : lungs were clear to auscultation bilaterally [Heart Sounds Gallop] : no gallops [Heart Sounds Pericardial Friction Rub] : no pericardial rub [Arterial Pulses Carotid] : carotid pulses were normal with no bruits [Full Pulse] : the pedal pulses are present [Edema] : there was no peripheral edema [Abdomen Soft] : soft [Abdomen Tenderness] : non-tender [Cervical Lymph Nodes Enlarged Posterior Bilaterally] : posterior cervical [Cervical Lymph Nodes Enlarged Anterior Bilaterally] : anterior cervical [Supraclavicular Lymph Nodes Enlarged Bilaterally] : supraclavicular [Axillary Lymph Nodes Enlarged Bilaterally] : axillary [Abnormal Walk] : normal gait [Dialysis Catheter] : dialysis catheter [Motor Exam] : the motor exam was normal [No Focal Deficits] : no focal deficits [Oriented To Time, Place, And Person] : oriented to person, place, and time [Impaired Insight] : insight and judgment were intact [Affect] : the affect was normal [Mood] : the mood was normal [Well Developed] : well developed [Well Nourished] : well nourished [No Acute Distress] : no acute distress [Normocephalic] : normocephalic [Atraumatic] : atraumatic [Sclera Anicteric] : sclera anicteric [Neck Supple] : neck supple [Clear to Auscultation] : clear to auscultation [Normal Rate] : normal rate [Regular Rhythm] : regular rhythm [Soft] : soft [Non-tender] : non-tender [] : right dorsalis pedis palpable [Alert] : alert [Responds to Questions Appropriately] : responds to questions appropriately [Oriented] : oriented [Appropriate] : appropriate [FreeTextEntry1] : No carotid bruits [TextBox_34] : No ulcers or edema [TextBox_73] : Given the history of CVA with right sided- weakness I tested patient's .   in both hands was very good with no major difference in laterality. [TextBox_86] : No adenopathy

## 2021-05-28 NOTE — HISTORY OF PRESENT ILLNESS
[FreeTextEntry1] : 65 years old  male, living in  since . Here for pre transplant follow up visit.\par Has no acute symptoms. Has improved level of activity.\par Patient has known CKD (), ESRD on hemodialysis (2019- [facility] in [place]) on follow up with Dr. Waters is here for pre kidney transplant evaluation. \par He has known DM () On insulin (); HTN (). CAD stent (2019) 2019 again had pos stress test  CVA (Dec 2019, just as he completed dialysis), had weakness of right leg and arm, was in rehab was in [facility] and now at home. He had PEG post CVA for 4 weeks and stopped using it in 2020 ,  was removed in . He is on out patient physical therapy 2 days a week.\par CAD CABG (2020)\par He had two units PRBC transfusion in 2019 after CVA.\par No known h/o kidney stone/ Prostatism.\par No hematuria/Nocturia.\par Urine out put:Normal amount. Passes urine once at night.On torsemide.\par Has no h/o Pneumonia / UTI.\par Has had CAD- one stent in . Done at [facility] ([place]) Again in , CABG .\par No known h/o active  PVD/DVT/neoplasia/active infections/bleeding.\par Most recent hospitalization/for: 2020 for CABG . \par Past surgeries:CABG . Has tunneled catheter on left side.\par No history of kidney/ bladder/ prostate surgery.\par Non smoker.\par Fam: Parents are  . Father - at 80 years, tongue cancer was a smoker.  Mother- at 71, had accident while crossing road at [facility] near [place] Siblings- 2 brothers and one sister.\par Children: 2 sons, One son is cardiology fellow at [employer], will move to [place] soon as attending. Chayito Castelan . Another is nursing  at [employer],  Chris Castelan.\par No family history of kidney disease.\par Independent for ADL. Not working since CVA in 2019.\par Lives with his wife, Rigo Norton and son Chris. His son Chayito is a cardiology fellow at [employer].\par Able to walk 5-6 blocks, uses walker when walking outside not inside the house.\par ROS: Has no h/o shortness of breath on exertion.\par Functional/employment status:Not working now.\par Dialysis history:On hemodialysis at [facility] dialysis unit.\par Kidney Biopsy: None\par Potential Live donors:None at present\par \par Prior Studies:\par Cardiology: had CABG, has had follow up with Dr. Maloney.\par Cancer Screen:Colonoscopy , PSA 2017- ok\par Consultants:Berenice Bose, Dr. He, Dr. Lincoln\par Primary MD: Pedro Corral\par \par Prior Transplants: None\par \par Current Meds:\par Coreg 3.125 X2/d\par Eliquis 2. 5 mg X2/d\par Amlodipine 5/d (has been recently discontinued)\par Keppra 250 mg x2/d, 125 post HD (following CVA, [facility] neurologist at [facility] ). Last seizure after CABG when he missed getting keppra as per son.\par Aspirin 81\par Torsemide 40 mg/d\par Novolog pre meals 3+ SS\par Levimir 5 units/d\par \par \par \par \par  [de-identified] : I personally saw and examined patient together with Dr. Alfred.\par patient's son also present.

## 2021-05-28 NOTE — PHYSICAL EXAM
[General Appearance - Alert] : alert [General Appearance - In No Acute Distress] : in no acute distress [Sclera] : the sclera and conjunctiva were normal [Extraocular Movements] : extraocular movements were intact [Jugular Venous Distention Increased] : there was no jugular-venous distention [Auscultation Breath Sounds / Voice Sounds] : lungs were clear to auscultation bilaterally [Heart Sounds Gallop] : no gallops [Heart Sounds Pericardial Friction Rub] : no pericardial rub [Arterial Pulses Carotid] : carotid pulses were normal with no bruits [Full Pulse] : the pedal pulses are present [Edema] : there was no peripheral edema [Abdomen Soft] : soft [Abdomen Tenderness] : non-tender [Cervical Lymph Nodes Enlarged Posterior Bilaterally] : posterior cervical [Cervical Lymph Nodes Enlarged Anterior Bilaterally] : anterior cervical [Supraclavicular Lymph Nodes Enlarged Bilaterally] : supraclavicular [Axillary Lymph Nodes Enlarged Bilaterally] : axillary [Abnormal Walk] : normal gait [Dialysis Catheter] : dialysis catheter [Motor Exam] : the motor exam was normal [No Focal Deficits] : no focal deficits [Oriented To Time, Place, And Person] : oriented to person, place, and time [Impaired Insight] : insight and judgment were intact [Affect] : the affect was normal [Mood] : the mood was normal [Well Developed] : well developed [Well Nourished] : well nourished [No Acute Distress] : no acute distress [Normocephalic] : normocephalic [Atraumatic] : atraumatic [Sclera Anicteric] : sclera anicteric [Neck Supple] : neck supple [Clear to Auscultation] : clear to auscultation [Normal Rate] : normal rate [Regular Rhythm] : regular rhythm [Soft] : soft [Non-tender] : non-tender [] : left dorsalis pedis palpable [Alert] : alert [Responds to Questions Appropriately] : responds to questions appropriately [Oriented] : oriented [Appropriate] : appropriate [FreeTextEntry1] : No carotid bruits [TextBox_73] : Given the history of CVA with right sided- weakness I tested patient's .   in both hands was very good with no major difference in laterality. [TextBox_34] : No ulcers or edema [TextBox_86] : No adenopathy

## 2021-05-28 NOTE — ASSESSMENT
[Fair candidate] : a fair candidate. We should proceed with our protocol for evaluation for kidney transplantation. [FreeTextEntry1] : Agree with plan to proceed to activate

## 2021-06-13 ENCOUNTER — TRANSCRIPTION ENCOUNTER (OUTPATIENT)
Age: 66
End: 2021-06-13

## 2021-06-13 ENCOUNTER — OUTPATIENT (OUTPATIENT)
Dept: INPATIENT UNIT | Facility: HOSPITAL | Age: 66
LOS: 1 days | End: 2021-06-13
Payer: MEDICARE

## 2021-06-13 VITALS
SYSTOLIC BLOOD PRESSURE: 171 MMHG | DIASTOLIC BLOOD PRESSURE: 84 MMHG | RESPIRATION RATE: 18 BRPM | OXYGEN SATURATION: 100 % | HEART RATE: 65 BPM | WEIGHT: 140.88 LBS | TEMPERATURE: 98 F

## 2021-06-13 DIAGNOSIS — Z94.0 KIDNEY TRANSPLANT STATUS: ICD-10-CM

## 2021-06-13 DIAGNOSIS — I10 ESSENTIAL (PRIMARY) HYPERTENSION: ICD-10-CM

## 2021-06-13 DIAGNOSIS — E11.9 TYPE 2 DIABETES MELLITUS WITHOUT COMPLICATIONS: ICD-10-CM

## 2021-06-13 DIAGNOSIS — Z95.1 PRESENCE OF AORTOCORONARY BYPASS GRAFT: Chronic | ICD-10-CM

## 2021-06-13 LAB
ALBUMIN SERPL ELPH-MCNC: 4.1 G/DL — SIGNIFICANT CHANGE UP (ref 3.3–5)
ALP SERPL-CCNC: 77 U/L — SIGNIFICANT CHANGE UP (ref 40–120)
ALT FLD-CCNC: 6 U/L — LOW (ref 10–45)
ANION GAP SERPL CALC-SCNC: 20 MMOL/L — HIGH (ref 5–17)
APTT BLD: 42.4 SEC — HIGH (ref 27.5–35.5)
AST SERPL-CCNC: 16 U/L — SIGNIFICANT CHANGE UP (ref 10–40)
BASE EXCESS BLDV CALC-SCNC: -3 MMOL/L — LOW (ref -2–2)
BILIRUB SERPL-MCNC: 0.2 MG/DL — SIGNIFICANT CHANGE UP (ref 0.2–1.2)
BLD GP AB SCN SERPL QL: NEGATIVE — SIGNIFICANT CHANGE UP
BUN SERPL-MCNC: 63 MG/DL — HIGH (ref 7–23)
CA-I SERPL-SCNC: 1.08 MMOL/L — LOW (ref 1.12–1.3)
CALCIUM SERPL-MCNC: 9.2 MG/DL — SIGNIFICANT CHANGE UP (ref 8.4–10.5)
CHLORIDE BLDV-SCNC: 99 MMOL/L — SIGNIFICANT CHANGE UP (ref 96–108)
CHLORIDE SERPL-SCNC: 88 MMOL/L — LOW (ref 96–108)
CO2 BLDV-SCNC: 22 MMOL/L — SIGNIFICANT CHANGE UP (ref 22–30)
CO2 SERPL-SCNC: 18 MMOL/L — LOW (ref 22–31)
CREAT SERPL-MCNC: 8.09 MG/DL — HIGH (ref 0.5–1.3)
GAS PNL BLDV: 122 MMOL/L — LOW (ref 135–145)
GAS PNL BLDV: SIGNIFICANT CHANGE UP
GAS PNL BLDV: SIGNIFICANT CHANGE UP
GLUCOSE BLDV-MCNC: 288 MG/DL — HIGH (ref 70–99)
GLUCOSE SERPL-MCNC: 292 MG/DL — HIGH (ref 70–99)
HCO3 BLDV-SCNC: 21 MMOL/L — SIGNIFICANT CHANGE UP (ref 21–29)
HCT VFR BLD CALC: 43.2 % — SIGNIFICANT CHANGE UP (ref 39–50)
HCT VFR BLDA CALC: 46 % — SIGNIFICANT CHANGE UP (ref 39–50)
HGB BLD CALC-MCNC: 15 G/DL — SIGNIFICANT CHANGE UP (ref 13–17)
HGB BLD-MCNC: 14.1 G/DL — SIGNIFICANT CHANGE UP (ref 13–17)
INR BLD: 1.34 RATIO — HIGH (ref 0.88–1.16)
LACTATE BLDV-MCNC: 1.6 MMOL/L — SIGNIFICANT CHANGE UP (ref 0.7–2)
MAGNESIUM SERPL-MCNC: 2.5 MG/DL — SIGNIFICANT CHANGE UP (ref 1.6–2.6)
MCHC RBC-ENTMCNC: 29.9 PG — SIGNIFICANT CHANGE UP (ref 27–34)
MCHC RBC-ENTMCNC: 32.6 GM/DL — SIGNIFICANT CHANGE UP (ref 32–36)
MCV RBC AUTO: 91.7 FL — SIGNIFICANT CHANGE UP (ref 80–100)
NRBC # BLD: 0 /100 WBCS — SIGNIFICANT CHANGE UP (ref 0–0)
OTHER CELLS CSF MANUAL: 20 ML/DL — SIGNIFICANT CHANGE UP (ref 18–22)
PCO2 BLDV: 35 MMHG — SIGNIFICANT CHANGE UP (ref 35–50)
PH BLDV: 7.39 — SIGNIFICANT CHANGE UP (ref 7.35–7.45)
PHOSPHATE SERPL-MCNC: 5.9 MG/DL — HIGH (ref 2.5–4.5)
PLATELET # BLD AUTO: 165 K/UL — SIGNIFICANT CHANGE UP (ref 150–400)
PO2 BLDV: 101 MMHG — HIGH (ref 25–45)
POTASSIUM BLDV-SCNC: 4.2 MMOL/L — SIGNIFICANT CHANGE UP (ref 3.5–5.3)
POTASSIUM SERPL-MCNC: 4.5 MMOL/L — SIGNIFICANT CHANGE UP (ref 3.5–5.3)
POTASSIUM SERPL-SCNC: 4.5 MMOL/L — SIGNIFICANT CHANGE UP (ref 3.5–5.3)
PROT SERPL-MCNC: 7.4 G/DL — SIGNIFICANT CHANGE UP (ref 6–8.3)
PROTHROM AB SERPL-ACNC: 15.9 SEC — HIGH (ref 10.6–13.6)
RBC # BLD: 4.71 M/UL — SIGNIFICANT CHANGE UP (ref 4.2–5.8)
RBC # FLD: 15.1 % — HIGH (ref 10.3–14.5)
RH IG SCN BLD-IMP: POSITIVE — SIGNIFICANT CHANGE UP
SAO2 % BLDV: 97 % — HIGH (ref 67–88)
SARS-COV-2 RNA SPEC QL NAA+PROBE: SIGNIFICANT CHANGE UP
SODIUM SERPL-SCNC: 126 MMOL/L — LOW (ref 135–145)
WBC # BLD: 6.36 K/UL — SIGNIFICANT CHANGE UP (ref 3.8–10.5)
WBC # FLD AUTO: 6.36 K/UL — SIGNIFICANT CHANGE UP (ref 3.8–10.5)

## 2021-06-13 PROCEDURE — 82330 ASSAY OF CALCIUM: CPT

## 2021-06-13 PROCEDURE — 85018 HEMOGLOBIN: CPT

## 2021-06-13 PROCEDURE — 82803 BLOOD GASES ANY COMBINATION: CPT

## 2021-06-13 PROCEDURE — 84132 ASSAY OF SERUM POTASSIUM: CPT

## 2021-06-13 PROCEDURE — 82565 ASSAY OF CREATININE: CPT

## 2021-06-13 PROCEDURE — 85014 HEMATOCRIT: CPT

## 2021-06-13 PROCEDURE — 86901 BLOOD TYPING SEROLOGIC RH(D): CPT

## 2021-06-13 PROCEDURE — 84100 ASSAY OF PHOSPHORUS: CPT

## 2021-06-13 PROCEDURE — 86665 EPSTEIN-BARR CAPSID VCA: CPT

## 2021-06-13 PROCEDURE — 86850 RBC ANTIBODY SCREEN: CPT

## 2021-06-13 PROCEDURE — 80053 COMPREHEN METABOLIC PANEL: CPT

## 2021-06-13 PROCEDURE — 71045 X-RAY EXAM CHEST 1 VIEW: CPT

## 2021-06-13 PROCEDURE — 85025 COMPLETE CBC W/AUTO DIFF WBC: CPT

## 2021-06-13 PROCEDURE — 86663 EPSTEIN-BARR ANTIBODY: CPT

## 2021-06-13 PROCEDURE — 83605 ASSAY OF LACTIC ACID: CPT

## 2021-06-13 PROCEDURE — 82947 ASSAY GLUCOSE BLOOD QUANT: CPT

## 2021-06-13 PROCEDURE — 85610 PROTHROMBIN TIME: CPT

## 2021-06-13 PROCEDURE — 82435 ASSAY OF BLOOD CHLORIDE: CPT

## 2021-06-13 PROCEDURE — 87799 DETECT AGENT NOS DNA QUANT: CPT

## 2021-06-13 PROCEDURE — 83735 ASSAY OF MAGNESIUM: CPT

## 2021-06-13 PROCEDURE — 86900 BLOOD TYPING SEROLOGIC ABO: CPT

## 2021-06-13 PROCEDURE — 84295 ASSAY OF SERUM SODIUM: CPT

## 2021-06-13 PROCEDURE — U0003: CPT

## 2021-06-13 PROCEDURE — 86664 EPSTEIN-BARR NUCLEAR ANTIGEN: CPT

## 2021-06-13 PROCEDURE — 85730 THROMBOPLASTIN TIME PARTIAL: CPT

## 2021-06-13 RX ORDER — SODIUM CHLORIDE 9 MG/ML
3 INJECTION INTRAMUSCULAR; INTRAVENOUS; SUBCUTANEOUS EVERY 8 HOURS
Refills: 0 | Status: DISCONTINUED | OUTPATIENT
Start: 2021-06-13 | End: 2021-06-13

## 2021-06-13 RX ORDER — CEFAZOLIN SODIUM 1 G
2000 VIAL (EA) INJECTION ONCE
Refills: 0 | Status: DISCONTINUED | OUTPATIENT
Start: 2021-06-13 | End: 2021-06-13

## 2021-06-13 NOTE — H&P ADULT - NSHPLABSRESULTS_GEN_ALL_CORE
Vital Signs Last 24 Hrs  T(C): 36.6 (13 Jun 2021 13:00), Max: 36.6 (13 Jun 2021 13:00)  T(F): 97.8 (13 Jun 2021 13:00), Max: 97.8 (13 Jun 2021 13:00)  HR: 65 (13 Jun 2021 13:00) (65 - 65)  BP: 171/84 (13 Jun 2021 13:00) (171/84 - 171/84)  BP(mean): --  RR: 18 (13 Jun 2021 13:00) (18 - 18)  SpO2: 100% (13 Jun 2021 13:00) (100% - 100%)    I&O's Summary                            14.1   6.36  )-----------( 165      ( 13 Jun 2021 14:31 )             43.2     06-13    126<L>  |  88<L>  |  63<H>  ----------------------------<  292<H>  4.5   |  18<L>  |  8.09<H>    Ca    9.2      13 Jun 2021 14:31  Phos  5.9     06-13  Mg     2.5     06-13    TPro  7.4  /  Alb  4.1  /  TBili  0.2  /  DBili  x   /  AST  16  /  ALT  6<L>  /  AlkPhos  77  06-13

## 2021-06-13 NOTE — H&P ADULT - NSHPPHYSICALEXAM_GEN_ALL_CORE
Constitutional: Well developed / well nourished  Eyes: Anicteric, PERRLA  ENMT: nc/at  Neck: supple  Respiratory: CTA B/L  Cardiovascular: RRR (well healed sternal scar from CABG)  Gastrointestinal: Soft, ND/NT. healed PEG site scar  Genitourinary: oliguria  Extremities: trace edema b/l LE  Vascular: Palpable dp pulses bilaterally  Neurological: A&O x3  Skin: no rashes, ulcerations or lesions;  Musculoskeletal: Moving all extremities, R sided with appropriate decreased strength 2/2 h/o CVA, walks with cane  Psychiatric: Responsive

## 2021-06-13 NOTE — DISCHARGE NOTE PROVIDER - NSDCMRMEDTOKEN_GEN_ALL_CORE_FT
apixaban 2.5 mg oral tablet: 1 tab(s) orally 2 times a day  aspirin 81 mg oral delayed release tablet: 1 tab(s) orally once a day  atorvastatin 80 mg oral tablet: 1 tab(s) orally once a day (at bedtime)  carvedilol 6.25 mg oral tablet: 1 tab(s) orally every 12 hours  isoniazid 300 mg oral tablet: 1 tab(s) orally once a day  Levemir FlexTouch 100 units/mL subcutaneous solution: 5  subcutaneous once a day   levETIRAcetam: 125 milligram(s) orally 3 times a week after dialysis   levETIRAcetam 250 mg oral tablet: 1 tab(s) orally 2 times a day  levothyroxine 50 mcg (0.05 mg) oral tablet: 1 tab(s) orally once a day  NovoLOG FlexPen 100 units/mL injectable solution: injectable 3 times a day  3 before breakfast, 4 before lunch, 3 before dinner  pyridoxine 100 mg oral tablet: 1 tab(s) orally once a day

## 2021-06-13 NOTE — H&P ADULT - HISTORY OF PRESENT ILLNESS
Transplant Surgery     65M with PMHx of DM (1988), HTN, CAD (stent x1 2009, x1 8/2019, CABG 8/2020 on Eliquis/ASA), CVA (12/2019) c/b R leg weakness requiring Rehab (now ambulatory with cane), PEG placement x 4 weeks (now removed), ESRD on HD (MWF via L PermCath since 6/2019 at [facility] with Dr. Waters, oliguric--QD) admitted for potential DDRT.    -Completed Moderna COVID vaccine series 3/7/2021  -on INH/B6 for latent TB (10/14/2020--7/2021); Quant negative, +PPD/pulm nodules, nephew with h/o TB, pt from [place]     Transplant Surgery     65M with PMHx of DM (1989), HTN, CAD (stent x1 2009, x1 8/2019, CABG 8/2020 on Eliquis/ASA), CVA (12/2019) c/b R leg weakness requiring Rehab (now ambulatory with cane), PEG placement x 4 weeks (now removed), seizure disorder (on Keppra), ESRD on HD (MWF via L PermCath since 6/2019 at [facility] with Dr. Waters, oliguric--QD) admitted for potential DDRT.    -Completed Moderna COVID vaccine series 3/7/2021  -on INH/B6 for latent TB (10/14/2020--7/2021); Quant negative, +PPD/pulm nodules, nephew with h/o TB, pt from [place]

## 2021-06-13 NOTE — DISCHARGE NOTE PROVIDER - NSDCCPCAREPLAN_GEN_ALL_CORE_FT
PRINCIPAL DISCHARGE DIAGNOSIS  Diagnosis: ESRD on dialysis  Assessment and Plan of Treatment: continue diaylsis and follow-up care as scheduled

## 2021-06-13 NOTE — H&P ADULT - ASSESSMENT
65M with PMHx of DM (1989), HTN, CAD (stent x1 2009, x1 8/2019, CABG 8/2020 on Eliquis/ASA), CVA (12/2019) c/b R leg weakness requiring Rehab (now ambulatory with cane), PEG placement x 4 weeks (now removed), ESRD on HD (MWF via L PermCath since 6/2019 at [facility] with Dr. Waters, oliguric--QD) admitted for potential DDRT.    -Unfortunately, case was cancelled due to issues with delayed donor transit time.    -Will d/c home and f/u as scheduled. Will continue with HD tomorrow as per Dr. Waters

## 2021-06-13 NOTE — H&P ADULT - NSHPSOCIALHISTORY_GEN_ALL_CORE
born in [place], in [place] 1984. Non smoker.   Not working since CVA 12/2019  Lives with his wife Rigo Norton and his son. Has 2 sons: Anna Castelan -  cardiology fellow at [employer], will move to [place] as attending. Chris Castelan - nursing at [employer], [employer] Castelan.

## 2021-06-13 NOTE — DISCHARGE NOTE NURSING/CASE MANAGEMENT/SOCIAL WORK - PATIENT PORTAL LINK FT
You can access the FollowMyHealth Patient Portal offered by [facility] by registering at the following website: [email or web address]. By joining [employer]’s FollowMyHealth portal, you will also be able to view your health information using other applications (apps) compatible with our system.

## 2021-06-13 NOTE — DISCHARGE NOTE PROVIDER - HOSPITAL COURSE
66 y/o Male with PMHx of DM (1988), HTN, CAD/stent - one stent in 2009, then 8/2019, CABG 8/2020, h/o CVA (12/2019) with weakness of right leg and arm, [facility] and now [facility]; also required PEG post CVA for 4 weeks and stopped using it in January 2020 , was removed in June. ESRD on HD since 6/2019 at [facility] in [place] via L permcat. Makes minimal urine (once at night). Able to walk 5-6 blocks, uses walker when walking outside not inside the house.    Admitted for a potential DDRT, but case was cancelled due to delayed organ transit time.  Pt was discharged home and will resume HD MWF until the next offer.  Dr. Waters (pt's nephrologist) at bedside and aware of plan.

## 2021-06-13 NOTE — H&P ADULT - NSICDXPASTMEDICALHX_GEN_ALL_CORE_FT
PAST MEDICAL HISTORY:  Anemia     CAD (Coronary Artery Disease) with Stents in 06/2009 and 6/2019, s/p off-pump C3L on 8/13/20    CVA (cerebral vascular accident) 12/13/19 with residual bilateral weakness    Diabetes     Dyslipidemia     ESRD on dialysis M/W/F    Hypertension     Hypothyroidism     Intubation of airway performed without difficulty Dec 2019  CVA c/b status epilepticus requiring intubation and PEG in 12/2019-    PEG (percutaneous endoscopic gastrostomy) status removed July 2020

## 2021-06-13 NOTE — H&P ADULT - NSICDXFAMILYHX_GEN_ALL_CORE_FT
FAMILY HISTORY:  Father  Still living? No  Family history of cancer of tongue, Age at diagnosis: Age Unknown

## 2021-06-14 LAB
EBV EA AB SER IA-ACNC: <5 U/ML — SIGNIFICANT CHANGE UP
EBV EA AB TITR SER IF: POSITIVE
EBV EA IGG SER-ACNC: NEGATIVE — SIGNIFICANT CHANGE UP
EBV NA IGG SER IA-ACNC: >600 U/ML — HIGH
EBV PATRN SPEC IB-IMP: SIGNIFICANT CHANGE UP
EBV VCA IGG AVIDITY SER QL IA: NEGATIVE — SIGNIFICANT CHANGE UP
EBV VCA IGM SER IA-ACNC: <10 U/ML — SIGNIFICANT CHANGE UP
EBV VCA IGM SER IA-ACNC: <10 U/ML — SIGNIFICANT CHANGE UP
EBV VCA IGM TITR FLD: NEGATIVE — SIGNIFICANT CHANGE UP

## 2021-06-16 LAB — EBV DNA SERPL NAA+PROBE-ACNC: SIGNIFICANT CHANGE UP IU/ML

## 2021-06-24 ENCOUNTER — INPATIENT (INPATIENT)
Facility: HOSPITAL | Age: 66
LOS: 6 days | Discharge: HOME | DRG: 913 | End: 2021-07-01
Attending: INTERNAL MEDICINE | Admitting: INTERNAL MEDICINE
Payer: COMMERCIAL

## 2021-06-24 VITALS
DIASTOLIC BLOOD PRESSURE: 110 MMHG | HEART RATE: 66 BPM | HEIGHT: 70 IN | RESPIRATION RATE: 20 BRPM | OXYGEN SATURATION: 97 % | SYSTOLIC BLOOD PRESSURE: 170 MMHG | WEIGHT: 145.06 LBS | TEMPERATURE: 99 F

## 2021-06-24 DIAGNOSIS — Z95.1 PRESENCE OF AORTOCORONARY BYPASS GRAFT: Chronic | ICD-10-CM

## 2021-06-24 DIAGNOSIS — S09.90XA UNSPECIFIED INJURY OF HEAD, INITIAL ENCOUNTER: ICD-10-CM

## 2021-06-24 LAB
ALBUMIN SERPL ELPH-MCNC: 4.6 G/DL — SIGNIFICANT CHANGE UP (ref 3.3–5)
ALP SERPL-CCNC: 64 U/L — SIGNIFICANT CHANGE UP (ref 40–120)
ALT FLD-CCNC: 19 U/L — SIGNIFICANT CHANGE UP (ref 10–45)
ANION GAP SERPL CALC-SCNC: 15 MMOL/L — SIGNIFICANT CHANGE UP (ref 5–17)
ANION GAP SERPL CALC-SCNC: 20 MMOL/L — HIGH (ref 5–17)
AST SERPL-CCNC: 115 U/L — HIGH (ref 10–40)
BASOPHILS # BLD AUTO: 0.03 K/UL — SIGNIFICANT CHANGE UP (ref 0–0.2)
BASOPHILS NFR BLD AUTO: 0.2 % — SIGNIFICANT CHANGE UP (ref 0–2)
BILIRUB SERPL-MCNC: 0.4 MG/DL — SIGNIFICANT CHANGE UP (ref 0.2–1.2)
BUN SERPL-MCNC: 60 MG/DL — HIGH (ref 7–23)
BUN SERPL-MCNC: 61 MG/DL — HIGH (ref 7–23)
CALCIUM SERPL-MCNC: 9.2 MG/DL — SIGNIFICANT CHANGE UP (ref 8.4–10.5)
CALCIUM SERPL-MCNC: 9.4 MG/DL — SIGNIFICANT CHANGE UP (ref 8.4–10.5)
CHLORIDE SERPL-SCNC: 90 MMOL/L — LOW (ref 96–108)
CHLORIDE SERPL-SCNC: 91 MMOL/L — LOW (ref 96–108)
CO2 SERPL-SCNC: 20 MMOL/L — LOW (ref 22–31)
CO2 SERPL-SCNC: 22 MMOL/L — SIGNIFICANT CHANGE UP (ref 22–31)
CREAT SERPL-MCNC: 7.04 MG/DL — HIGH (ref 0.5–1.3)
CREAT SERPL-MCNC: 7.13 MG/DL — HIGH (ref 0.5–1.3)
EOSINOPHIL # BLD AUTO: 0.34 K/UL — SIGNIFICANT CHANGE UP (ref 0–0.5)
EOSINOPHIL NFR BLD AUTO: 2.7 % — SIGNIFICANT CHANGE UP (ref 0–6)
GLUCOSE BLDC GLUCOMTR-MCNC: 106 MG/DL — HIGH (ref 70–99)
GLUCOSE SERPL-MCNC: 138 MG/DL — HIGH (ref 70–99)
GLUCOSE SERPL-MCNC: 189 MG/DL — HIGH (ref 70–99)
HCT VFR BLD CALC: 48.6 % — SIGNIFICANT CHANGE UP (ref 39–50)
HGB BLD-MCNC: 15.4 G/DL — SIGNIFICANT CHANGE UP (ref 13–17)
IMM GRANULOCYTES NFR BLD AUTO: 1.5 % — SIGNIFICANT CHANGE UP (ref 0–1.5)
LYMPHOCYTES # BLD AUTO: 19.3 % — SIGNIFICANT CHANGE UP (ref 13–44)
LYMPHOCYTES # BLD AUTO: 2.39 K/UL — SIGNIFICANT CHANGE UP (ref 1–3.3)
MCHC RBC-ENTMCNC: 29.1 PG — SIGNIFICANT CHANGE UP (ref 27–34)
MCHC RBC-ENTMCNC: 31.7 GM/DL — LOW (ref 32–36)
MCV RBC AUTO: 91.9 FL — SIGNIFICANT CHANGE UP (ref 80–100)
MONOCYTES # BLD AUTO: 0.75 K/UL — SIGNIFICANT CHANGE UP (ref 0–0.9)
MONOCYTES NFR BLD AUTO: 6 % — SIGNIFICANT CHANGE UP (ref 2–14)
NEUTROPHILS # BLD AUTO: 8.71 K/UL — HIGH (ref 1.8–7.4)
NEUTROPHILS NFR BLD AUTO: 70.3 % — SIGNIFICANT CHANGE UP (ref 43–77)
NRBC # BLD: 0 /100 WBCS — SIGNIFICANT CHANGE UP (ref 0–0)
PLATELET # BLD AUTO: 181 K/UL — SIGNIFICANT CHANGE UP (ref 150–400)
POTASSIUM SERPL-MCNC: 5.9 MMOL/L — HIGH (ref 3.5–5.3)
POTASSIUM SERPL-MCNC: >9 MMOL/L — CRITICAL HIGH (ref 3.5–5.3)
POTASSIUM SERPL-SCNC: 5.9 MMOL/L — HIGH (ref 3.5–5.3)
POTASSIUM SERPL-SCNC: >9 MMOL/L — CRITICAL HIGH (ref 3.5–5.3)
PROT SERPL-MCNC: 8.9 G/DL — HIGH (ref 6–8.3)
RBC # BLD: 5.29 M/UL — SIGNIFICANT CHANGE UP (ref 4.2–5.8)
RBC # FLD: 14.5 % — SIGNIFICANT CHANGE UP (ref 10.3–14.5)
SODIUM SERPL-SCNC: 127 MMOL/L — LOW (ref 135–145)
SODIUM SERPL-SCNC: 131 MMOL/L — LOW (ref 135–145)
WBC # BLD: 12.41 K/UL — HIGH (ref 3.8–10.5)
WBC # FLD AUTO: 12.41 K/UL — HIGH (ref 3.8–10.5)

## 2021-06-24 PROCEDURE — 72125 CT NECK SPINE W/O DYE: CPT | Mod: 26,MA

## 2021-06-24 PROCEDURE — 93010 ELECTROCARDIOGRAM REPORT: CPT | Mod: GC

## 2021-06-24 PROCEDURE — 73130 X-RAY EXAM OF HAND: CPT | Mod: 26,LT

## 2021-06-24 PROCEDURE — 27197 CLSD TX PELVIC RING FX: CPT

## 2021-06-24 PROCEDURE — 99223 1ST HOSP IP/OBS HIGH 75: CPT

## 2021-06-24 PROCEDURE — 72170 X-RAY EXAM OF PELVIS: CPT | Mod: 26,59

## 2021-06-24 PROCEDURE — 70450 CT HEAD/BRAIN W/O DYE: CPT | Mod: 26,MA

## 2021-06-24 PROCEDURE — 71045 X-RAY EXAM CHEST 1 VIEW: CPT | Mod: 26

## 2021-06-24 PROCEDURE — 99291 CRITICAL CARE FIRST HOUR: CPT | Mod: GC

## 2021-06-24 PROCEDURE — 73522 X-RAY EXAM HIPS BI 3-4 VIEWS: CPT | Mod: 26

## 2021-06-24 PROCEDURE — 73562 X-RAY EXAM OF KNEE 3: CPT | Mod: 26,RT

## 2021-06-24 RX ORDER — ACETAMINOPHEN 500 MG
975 TABLET ORAL ONCE
Refills: 0 | Status: COMPLETED | OUTPATIENT
Start: 2021-06-24 | End: 2021-06-24

## 2021-06-24 RX ORDER — INSULIN HUMAN 100 [IU]/ML
5 INJECTION, SOLUTION SUBCUTANEOUS ONCE
Refills: 0 | Status: COMPLETED | OUTPATIENT
Start: 2021-06-24 | End: 2021-06-24

## 2021-06-24 RX ORDER — DEXTROSE 50 % IN WATER 50 %
50 SYRINGE (ML) INTRAVENOUS ONCE
Refills: 0 | Status: COMPLETED | OUTPATIENT
Start: 2021-06-24 | End: 2021-06-24

## 2021-06-24 RX ORDER — SODIUM ZIRCONIUM CYCLOSILICATE 10 G/10G
10 POWDER, FOR SUSPENSION ORAL ONCE
Refills: 0 | Status: COMPLETED | OUTPATIENT
Start: 2021-06-24 | End: 2021-06-24

## 2021-06-24 RX ORDER — MORPHINE SULFATE 50 MG/1
4 CAPSULE, EXTENDED RELEASE ORAL ONCE
Refills: 0 | Status: DISCONTINUED | OUTPATIENT
Start: 2021-06-24 | End: 2021-06-24

## 2021-06-24 RX ORDER — CALCIUM GLUCONATE 100 MG/ML
2 VIAL (ML) INTRAVENOUS ONCE
Refills: 0 | Status: COMPLETED | OUTPATIENT
Start: 2021-06-24 | End: 2021-06-24

## 2021-06-24 RX ORDER — TETANUS TOXOID, REDUCED DIPHTHERIA TOXOID AND ACELLULAR PERTUSSIS VACCINE, ADSORBED 5; 2.5; 8; 8; 2.5 [IU]/.5ML; [IU]/.5ML; UG/.5ML; UG/.5ML; UG/.5ML
0.5 SUSPENSION INTRAMUSCULAR ONCE
Refills: 0 | Status: COMPLETED | OUTPATIENT
Start: 2021-06-24 | End: 2021-06-24

## 2021-06-24 RX ADMIN — Medication 50 MILLILITER(S): at 22:52

## 2021-06-24 RX ADMIN — SODIUM ZIRCONIUM CYCLOSILICATE 10 GRAM(S): 10 POWDER, FOR SUSPENSION ORAL at 22:52

## 2021-06-24 RX ADMIN — INSULIN HUMAN 5 UNIT(S): 100 INJECTION, SOLUTION SUBCUTANEOUS at 22:52

## 2021-06-24 RX ADMIN — Medication 975 MILLIGRAM(S): at 20:00

## 2021-06-24 RX ADMIN — MORPHINE SULFATE 4 MILLIGRAM(S): 50 CAPSULE, EXTENDED RELEASE ORAL at 21:34

## 2021-06-24 RX ADMIN — TETANUS TOXOID, REDUCED DIPHTHERIA TOXOID AND ACELLULAR PERTUSSIS VACCINE, ADSORBED 0.5 MILLILITER(S): 5; 2.5; 8; 8; 2.5 SUSPENSION INTRAMUSCULAR at 20:00

## 2021-06-24 RX ADMIN — Medication 200 GRAM(S): at 22:53

## 2021-06-24 NOTE — H&P ADULT - NSHPPHYSICALEXAM_GEN_ALL_CORE
T(C): 36.7 (06-24-21 @ 21:50), Max: 37.2 (06-24-21 @ 19:41)  HR: 71 (06-24-21 @ 21:50) (66 - 71)  BP: 176/71 (06-24-21 @ 21:50) (170/110 - 176/71)  RR: 19 (06-24-21 @ 21:50) (19 - 20)  SpO2: 97% (06-24-21 @ 21:50) (97% - 97%)    Gen: male in NAD, appears comfortable, no diaphoresis  HEENT: MMM, neck soft and supple, right forehead swelling, some dried blood noted around right eyes  CV: +S1/S2, no m/r/g  Resp: CTAB, no w/r/r  GI: normoactive BS, soft, NTND, no rebounding/guarding  Ext: No LE edema, extremities appear warm and well perfused   Neuro: AOx3, CNII-XII grossly intact, chronic right sided upper extremity weakness  Psych: No SI/HI/AVH, appropriate affect  Skin: no petechiae, ecchymosis or maculopapular rash noted
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
## 2021-06-24 NOTE — H&P ADULT - PROBLEM SELECTOR PLAN 1
wait time explained
Per ortho non-operative and WBAT on right side. Pending PT consult. Pain control with Tylenol & Oxycodone PRN  DVT PPx
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
## 2021-06-24 NOTE — ED PROVIDER NOTE - PROGRESS NOTE DETAILS
Rojelio Fang, PGY 3: c-collar d/c due to no pain on exam wAngela TEMPLETON Rojelio Fang, PGY 3: c-collar d/c due to no pain on exam w. FROM Rojelio Fang, PGY 3: ortho is consulted for pelvic fx. JOVI Watters MD: Repeat labs show hyperkalemia to 5.9. EKG shows peaked T-waves. Will treat for hyperkalemia, consult nephrology, admit for HD. Pt unable to ambulate. Per Orthopedics, fxs are non-operative. Will admit for further eval and mgt. Pt's private nephrologist consulted for HD Rojelio Fang, PGY 3: spoke with nephro and will set up dialysis for tonight. updated the charge nurse regarding dialysis bed.
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
## 2021-06-24 NOTE — ED PROVIDER NOTE - PHYSICAL EXAMINATION
General: NAD, good hygiene, well developed  HENT: right lateral frontal 3cm hematoma w. dry blood, no active bleeding, PERRLA, EOMI, no conjunctivae injection, moist mucosa.  Neck: c-collar in place, normal ROM and trachea midline, no midline tenderness  Cardiovascular: RRR, S1&2, no M or R, radial pulses equal and b/l  Respiratory: CTABL, no wheezes or crackles, no decreased breath sounds  Abdominal: soft and non-tender non distended, no seat belt sign, neg for guarding, reynoso's sign, rovsing's sign, mcburney's sign, no CVA tenderness   Extremities: no edema of the legs/feet, DP/PT equal b/l  Skin: warm, well perfused  Neuro: CN2-12 intact, AOX3, EOMI. PERRLA, 4/5 strength in UE and LE on the right side 5/5 on the left.  Sensation intact in UE/LE b/l    Psych: normal mood and affect General: NAD, good hygiene, well developed  HENT: right lateral frontal 3cm hematoma w. dry blood and abrasion, no active bleeding, PERRLA, EOMI, no conjunctivae injection, moist mucosa.  Neck: c-collar in place, normal ROM and trachea midline, no midline tenderness  Cardiovascular: RRR, S1&2, no M or R, radial pulses equal and b/l  Respiratory: CTABL, no wheezes or crackles, no decreased breath sounds  Abdominal: soft and non-tender non distended, no seat belt sign, neg for guarding, reynoso's sign, rovsing's sign, mcburney's sign, no CVA tenderness   Extremities: no edema of the legs/feet, DP/PT equal b/l  Skin: warm, well perfused  Neuro: CN2-12 intact, AOX3, EOMI. PERRLA, 4/5 strength in UE and LE on the right side 5/5 on the left.  Sensation intact in UE/LE b/l    Psych: normal mood and affect
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
## 2021-06-25 DIAGNOSIS — E11.9 TYPE 2 DIABETES MELLITUS WITHOUT COMPLICATIONS: ICD-10-CM

## 2021-06-25 DIAGNOSIS — S32.591A OTHER SPECIFIED FRACTURE OF RIGHT PUBIS, INITIAL ENCOUNTER FOR CLOSED FRACTURE: ICD-10-CM

## 2021-06-25 DIAGNOSIS — I10 ESSENTIAL (PRIMARY) HYPERTENSION: ICD-10-CM

## 2021-06-25 DIAGNOSIS — I25.10 ATHEROSCLEROTIC HEART DISEASE OF NATIVE CORONARY ARTERY WITHOUT ANGINA PECTORIS: ICD-10-CM

## 2021-06-25 DIAGNOSIS — I48.20 CHRONIC ATRIAL FIBRILLATION, UNSPECIFIED: ICD-10-CM

## 2021-06-25 DIAGNOSIS — E03.9 HYPOTHYROIDISM, UNSPECIFIED: ICD-10-CM

## 2021-06-25 DIAGNOSIS — N18.6 END STAGE RENAL DISEASE: ICD-10-CM

## 2021-06-25 DIAGNOSIS — E87.5 HYPERKALEMIA: ICD-10-CM

## 2021-06-25 DIAGNOSIS — I63.9 CEREBRAL INFARCTION, UNSPECIFIED: ICD-10-CM

## 2021-06-25 DIAGNOSIS — S09.90XA UNSPECIFIED INJURY OF HEAD, INITIAL ENCOUNTER: ICD-10-CM

## 2021-06-25 LAB
ALBUMIN SERPL ELPH-MCNC: 4.3 G/DL — SIGNIFICANT CHANGE UP (ref 3.3–5)
ALBUMIN SERPL ELPH-MCNC: 4.3 G/DL — SIGNIFICANT CHANGE UP (ref 3.3–5)
ALP SERPL-CCNC: 74 U/L — SIGNIFICANT CHANGE UP (ref 40–120)
ALP SERPL-CCNC: 81 U/L — SIGNIFICANT CHANGE UP (ref 40–120)
ALT FLD-CCNC: 11 U/L — SIGNIFICANT CHANGE UP (ref 10–45)
ALT FLD-CCNC: 11 U/L — SIGNIFICANT CHANGE UP (ref 10–45)
ANION GAP SERPL CALC-SCNC: 15 MMOL/L — SIGNIFICANT CHANGE UP (ref 5–17)
ANION GAP SERPL CALC-SCNC: 16 MMOL/L — SIGNIFICANT CHANGE UP (ref 5–17)
ANION GAP SERPL CALC-SCNC: 19 MMOL/L — HIGH (ref 5–17)
AST SERPL-CCNC: 24 U/L — SIGNIFICANT CHANGE UP (ref 10–40)
AST SERPL-CCNC: 26 U/L — SIGNIFICANT CHANGE UP (ref 10–40)
BASOPHILS # BLD AUTO: 0.02 K/UL — SIGNIFICANT CHANGE UP (ref 0–0.2)
BASOPHILS NFR BLD AUTO: 0.3 % — SIGNIFICANT CHANGE UP (ref 0–2)
BILIRUB SERPL-MCNC: 0.4 MG/DL — SIGNIFICANT CHANGE UP (ref 0.2–1.2)
BILIRUB SERPL-MCNC: 0.4 MG/DL — SIGNIFICANT CHANGE UP (ref 0.2–1.2)
BUN SERPL-MCNC: 28 MG/DL — HIGH (ref 7–23)
BUN SERPL-MCNC: 41 MG/DL — HIGH (ref 7–23)
BUN SERPL-MCNC: 64 MG/DL — HIGH (ref 7–23)
CALCIUM SERPL-MCNC: 10.5 MG/DL — SIGNIFICANT CHANGE UP (ref 8.4–10.5)
CALCIUM SERPL-MCNC: 9.3 MG/DL — SIGNIFICANT CHANGE UP (ref 8.4–10.5)
CALCIUM SERPL-MCNC: 9.5 MG/DL — SIGNIFICANT CHANGE UP (ref 8.4–10.5)
CHLORIDE SERPL-SCNC: 92 MMOL/L — LOW (ref 96–108)
CHLORIDE SERPL-SCNC: 92 MMOL/L — LOW (ref 96–108)
CHLORIDE SERPL-SCNC: 94 MMOL/L — LOW (ref 96–108)
CO2 SERPL-SCNC: 21 MMOL/L — LOW (ref 22–31)
CO2 SERPL-SCNC: 22 MMOL/L — SIGNIFICANT CHANGE UP (ref 22–31)
CO2 SERPL-SCNC: 25 MMOL/L — SIGNIFICANT CHANGE UP (ref 22–31)
CREAT SERPL-MCNC: 4.75 MG/DL — HIGH (ref 0.5–1.3)
CREAT SERPL-MCNC: 6 MG/DL — HIGH (ref 0.5–1.3)
CREAT SERPL-MCNC: 7.58 MG/DL — HIGH (ref 0.5–1.3)
EOSINOPHIL # BLD AUTO: 0.18 K/UL — SIGNIFICANT CHANGE UP (ref 0–0.5)
EOSINOPHIL NFR BLD AUTO: 2.3 % — SIGNIFICANT CHANGE UP (ref 0–6)
GLUCOSE BLDC GLUCOMTR-MCNC: 160 MG/DL — HIGH (ref 70–99)
GLUCOSE BLDC GLUCOMTR-MCNC: 201 MG/DL — HIGH (ref 70–99)
GLUCOSE BLDC GLUCOMTR-MCNC: 215 MG/DL — HIGH (ref 70–99)
GLUCOSE BLDC GLUCOMTR-MCNC: 235 MG/DL — HIGH (ref 70–99)
GLUCOSE BLDC GLUCOMTR-MCNC: 94 MG/DL — SIGNIFICANT CHANGE UP (ref 70–99)
GLUCOSE SERPL-MCNC: 172 MG/DL — HIGH (ref 70–99)
GLUCOSE SERPL-MCNC: 176 MG/DL — HIGH (ref 70–99)
GLUCOSE SERPL-MCNC: 244 MG/DL — HIGH (ref 70–99)
HBV CORE AB SER-ACNC: REACTIVE
HBV SURFACE AB SER-ACNC: SIGNIFICANT CHANGE UP MIU/ML
HBV SURFACE AG SER-ACNC: SIGNIFICANT CHANGE UP
HCT VFR BLD CALC: 43.5 % — SIGNIFICANT CHANGE UP (ref 39–50)
HCT VFR BLD CALC: 45.1 % — SIGNIFICANT CHANGE UP (ref 39–50)
HCV AB S/CO SERPL IA: 0.24 S/CO — SIGNIFICANT CHANGE UP (ref 0–0.99)
HCV AB SERPL-IMP: SIGNIFICANT CHANGE UP
HGB BLD-MCNC: 14.1 G/DL — SIGNIFICANT CHANGE UP (ref 13–17)
HGB BLD-MCNC: 14.7 G/DL — SIGNIFICANT CHANGE UP (ref 13–17)
IMM GRANULOCYTES NFR BLD AUTO: 0.3 % — SIGNIFICANT CHANGE UP (ref 0–1.5)
LACTATE SERPL-SCNC: 1.2 MMOL/L — SIGNIFICANT CHANGE UP (ref 0.7–2)
LYMPHOCYTES # BLD AUTO: 0.94 K/UL — LOW (ref 1–3.3)
LYMPHOCYTES # BLD AUTO: 12.3 % — LOW (ref 13–44)
MAGNESIUM SERPL-MCNC: 2.3 MG/DL — SIGNIFICANT CHANGE UP (ref 1.6–2.6)
MCHC RBC-ENTMCNC: 29.5 PG — SIGNIFICANT CHANGE UP (ref 27–34)
MCHC RBC-ENTMCNC: 29.6 PG — SIGNIFICANT CHANGE UP (ref 27–34)
MCHC RBC-ENTMCNC: 32.4 GM/DL — SIGNIFICANT CHANGE UP (ref 32–36)
MCHC RBC-ENTMCNC: 32.6 GM/DL — SIGNIFICANT CHANGE UP (ref 32–36)
MCV RBC AUTO: 90.6 FL — SIGNIFICANT CHANGE UP (ref 80–100)
MCV RBC AUTO: 91.2 FL — SIGNIFICANT CHANGE UP (ref 80–100)
MONOCYTES # BLD AUTO: 0.37 K/UL — SIGNIFICANT CHANGE UP (ref 0–0.9)
MONOCYTES NFR BLD AUTO: 4.8 % — SIGNIFICANT CHANGE UP (ref 2–14)
NEUTROPHILS # BLD AUTO: 6.13 K/UL — SIGNIFICANT CHANGE UP (ref 1.8–7.4)
NEUTROPHILS NFR BLD AUTO: 80 % — HIGH (ref 43–77)
NRBC # BLD: 0 /100 WBCS — SIGNIFICANT CHANGE UP (ref 0–0)
NRBC # BLD: 0 /100 WBCS — SIGNIFICANT CHANGE UP (ref 0–0)
PHOSPHATE SERPL-MCNC: 4.2 MG/DL — SIGNIFICANT CHANGE UP (ref 2.5–4.5)
PLATELET # BLD AUTO: 157 K/UL — SIGNIFICANT CHANGE UP (ref 150–400)
PLATELET # BLD AUTO: 163 K/UL — SIGNIFICANT CHANGE UP (ref 150–400)
POTASSIUM SERPL-MCNC: 4.4 MMOL/L — SIGNIFICANT CHANGE UP (ref 3.5–5.3)
POTASSIUM SERPL-MCNC: 4.8 MMOL/L — SIGNIFICANT CHANGE UP (ref 3.5–5.3)
POTASSIUM SERPL-MCNC: 5.1 MMOL/L — SIGNIFICANT CHANGE UP (ref 3.5–5.3)
POTASSIUM SERPL-SCNC: 4.4 MMOL/L — SIGNIFICANT CHANGE UP (ref 3.5–5.3)
POTASSIUM SERPL-SCNC: 4.8 MMOL/L — SIGNIFICANT CHANGE UP (ref 3.5–5.3)
POTASSIUM SERPL-SCNC: 5.1 MMOL/L — SIGNIFICANT CHANGE UP (ref 3.5–5.3)
PROT SERPL-MCNC: 7.8 G/DL — SIGNIFICANT CHANGE UP (ref 6–8.3)
PROT SERPL-MCNC: 8 G/DL — SIGNIFICANT CHANGE UP (ref 6–8.3)
RBC # BLD: 4.77 M/UL — SIGNIFICANT CHANGE UP (ref 4.2–5.8)
RBC # BLD: 4.98 M/UL — SIGNIFICANT CHANGE UP (ref 4.2–5.8)
RBC # FLD: 14.2 % — SIGNIFICANT CHANGE UP (ref 10.3–14.5)
RBC # FLD: 14.2 % — SIGNIFICANT CHANGE UP (ref 10.3–14.5)
SARS-COV-2 RNA SPEC QL NAA+PROBE: SIGNIFICANT CHANGE UP
SODIUM SERPL-SCNC: 129 MMOL/L — LOW (ref 135–145)
SODIUM SERPL-SCNC: 132 MMOL/L — LOW (ref 135–145)
SODIUM SERPL-SCNC: 135 MMOL/L — SIGNIFICANT CHANGE UP (ref 135–145)
WBC # BLD: 5.9 K/UL — SIGNIFICANT CHANGE UP (ref 3.8–10.5)
WBC # BLD: 7.66 K/UL — SIGNIFICANT CHANGE UP (ref 3.8–10.5)
WBC # FLD AUTO: 5.9 K/UL — SIGNIFICANT CHANGE UP (ref 3.8–10.5)
WBC # FLD AUTO: 7.66 K/UL — SIGNIFICANT CHANGE UP (ref 3.8–10.5)

## 2021-06-25 PROCEDURE — 70450 CT HEAD/BRAIN W/O DYE: CPT | Mod: 26

## 2021-06-25 PROCEDURE — 72190 X-RAY EXAM OF PELVIS: CPT | Mod: 26

## 2021-06-25 RX ORDER — ASPIRIN/CALCIUM CARB/MAGNESIUM 324 MG
81 TABLET ORAL DAILY
Refills: 0 | Status: DISCONTINUED | OUTPATIENT
Start: 2021-06-25 | End: 2021-07-01

## 2021-06-25 RX ORDER — SODIUM CHLORIDE 9 MG/ML
1000 INJECTION, SOLUTION INTRAVENOUS
Refills: 0 | Status: DISCONTINUED | OUTPATIENT
Start: 2021-06-25 | End: 2021-07-01

## 2021-06-25 RX ORDER — HEPARIN SODIUM 5000 [USP'U]/ML
5000 INJECTION INTRAVENOUS; SUBCUTANEOUS EVERY 8 HOURS
Refills: 0 | Status: DISCONTINUED | OUTPATIENT
Start: 2021-06-25 | End: 2021-07-01

## 2021-06-25 RX ORDER — LEVETIRACETAM 250 MG/1
250 TABLET, FILM COATED ORAL
Refills: 0 | Status: DISCONTINUED | OUTPATIENT
Start: 2021-06-25 | End: 2021-06-26

## 2021-06-25 RX ORDER — ACETAMINOPHEN 500 MG
650 TABLET ORAL EVERY 6 HOURS
Refills: 0 | Status: DISCONTINUED | OUTPATIENT
Start: 2021-06-25 | End: 2021-07-01

## 2021-06-25 RX ORDER — DEXTROSE 50 % IN WATER 50 %
15 SYRINGE (ML) INTRAVENOUS ONCE
Refills: 0 | Status: DISCONTINUED | OUTPATIENT
Start: 2021-06-25 | End: 2021-07-01

## 2021-06-25 RX ORDER — CARVEDILOL PHOSPHATE 80 MG/1
6.25 CAPSULE, EXTENDED RELEASE ORAL EVERY 12 HOURS
Refills: 0 | Status: DISCONTINUED | OUTPATIENT
Start: 2021-06-25 | End: 2021-07-01

## 2021-06-25 RX ORDER — AMLODIPINE BESYLATE 2.5 MG/1
10 TABLET ORAL DAILY
Refills: 0 | Status: DISCONTINUED | OUTPATIENT
Start: 2021-06-25 | End: 2021-07-01

## 2021-06-25 RX ORDER — LEVETIRACETAM 250 MG/1
125 TABLET, FILM COATED ORAL
Refills: 0 | Status: DISCONTINUED | OUTPATIENT
Start: 2021-06-25 | End: 2021-06-30

## 2021-06-25 RX ORDER — DEXTROSE 50 % IN WATER 50 %
25 SYRINGE (ML) INTRAVENOUS ONCE
Refills: 0 | Status: DISCONTINUED | OUTPATIENT
Start: 2021-06-25 | End: 2021-07-01

## 2021-06-25 RX ORDER — LOSARTAN POTASSIUM 100 MG/1
50 TABLET, FILM COATED ORAL DAILY
Refills: 0 | Status: DISCONTINUED | OUTPATIENT
Start: 2021-06-25 | End: 2021-06-28

## 2021-06-25 RX ORDER — HEXAVITAMINS
300 TABLET ORAL DAILY
Refills: 0 | Status: DISCONTINUED | OUTPATIENT
Start: 2021-06-25 | End: 2021-07-01

## 2021-06-25 RX ORDER — OXYCODONE HYDROCHLORIDE 5 MG/1
2.5 TABLET ORAL EVERY 6 HOURS
Refills: 0 | Status: DISCONTINUED | OUTPATIENT
Start: 2021-06-25 | End: 2021-07-01

## 2021-06-25 RX ORDER — INSULIN LISPRO 100/ML
VIAL (ML) SUBCUTANEOUS
Refills: 0 | Status: DISCONTINUED | OUTPATIENT
Start: 2021-06-25 | End: 2021-07-01

## 2021-06-25 RX ORDER — INSULIN LISPRO 100/ML
VIAL (ML) SUBCUTANEOUS AT BEDTIME
Refills: 0 | Status: DISCONTINUED | OUTPATIENT
Start: 2021-06-25 | End: 2021-07-01

## 2021-06-25 RX ORDER — ATORVASTATIN CALCIUM 80 MG/1
80 TABLET, FILM COATED ORAL AT BEDTIME
Refills: 0 | Status: DISCONTINUED | OUTPATIENT
Start: 2021-06-25 | End: 2021-06-26

## 2021-06-25 RX ORDER — GLUCAGON INJECTION, SOLUTION 0.5 MG/.1ML
1 INJECTION, SOLUTION SUBCUTANEOUS ONCE
Refills: 0 | Status: DISCONTINUED | OUTPATIENT
Start: 2021-06-25 | End: 2021-07-01

## 2021-06-25 RX ORDER — INSULIN ASPART 100 [IU]/ML
0 INJECTION, SOLUTION SUBCUTANEOUS
Qty: 0 | Refills: 0 | DISCHARGE

## 2021-06-25 RX ORDER — LEVOTHYROXINE SODIUM 125 MCG
50 TABLET ORAL DAILY
Refills: 0 | Status: DISCONTINUED | OUTPATIENT
Start: 2021-06-25 | End: 2021-07-01

## 2021-06-25 RX ORDER — LEVETIRACETAM 250 MG/1
250 TABLET, FILM COATED ORAL EVERY 12 HOURS
Refills: 0 | Status: DISCONTINUED | OUTPATIENT
Start: 2021-06-25 | End: 2021-06-26

## 2021-06-25 RX ORDER — DEXTROSE 50 % IN WATER 50 %
12.5 SYRINGE (ML) INTRAVENOUS ONCE
Refills: 0 | Status: DISCONTINUED | OUTPATIENT
Start: 2021-06-25 | End: 2021-07-01

## 2021-06-25 RX ORDER — PYRIDOXINE HCL (VITAMIN B6) 100 MG
100 TABLET ORAL DAILY
Refills: 0 | Status: DISCONTINUED | OUTPATIENT
Start: 2021-06-25 | End: 2021-07-01

## 2021-06-25 RX ORDER — LEVETIRACETAM 250 MG/1
125 TABLET, FILM COATED ORAL
Refills: 0 | Status: DISCONTINUED | OUTPATIENT
Start: 2021-06-25 | End: 2021-06-25

## 2021-06-25 RX ADMIN — Medication 81 MILLIGRAM(S): at 12:17

## 2021-06-25 RX ADMIN — Medication 2: at 18:24

## 2021-06-25 RX ADMIN — ATORVASTATIN CALCIUM 80 MILLIGRAM(S): 80 TABLET, FILM COATED ORAL at 22:22

## 2021-06-25 RX ADMIN — OXYCODONE HYDROCHLORIDE 2.5 MILLIGRAM(S): 5 TABLET ORAL at 20:15

## 2021-06-25 RX ADMIN — OXYCODONE HYDROCHLORIDE 2.5 MILLIGRAM(S): 5 TABLET ORAL at 16:15

## 2021-06-25 RX ADMIN — LEVETIRACETAM 250 MILLIGRAM(S): 250 TABLET, FILM COATED ORAL at 05:45

## 2021-06-25 RX ADMIN — OXYCODONE HYDROCHLORIDE 2.5 MILLIGRAM(S): 5 TABLET ORAL at 05:45

## 2021-06-25 RX ADMIN — OXYCODONE HYDROCHLORIDE 2.5 MILLIGRAM(S): 5 TABLET ORAL at 21:15

## 2021-06-25 RX ADMIN — Medication 50 MICROGRAM(S): at 05:45

## 2021-06-25 RX ADMIN — CARVEDILOL PHOSPHATE 6.25 MILLIGRAM(S): 80 CAPSULE, EXTENDED RELEASE ORAL at 05:45

## 2021-06-25 RX ADMIN — HEPARIN SODIUM 5000 UNIT(S): 5000 INJECTION INTRAVENOUS; SUBCUTANEOUS at 22:22

## 2021-06-25 RX ADMIN — LOSARTAN POTASSIUM 50 MILLIGRAM(S): 100 TABLET, FILM COATED ORAL at 22:31

## 2021-06-25 RX ADMIN — Medication 2: at 12:20

## 2021-06-25 RX ADMIN — LEVETIRACETAM 400 MILLIGRAM(S): 250 TABLET, FILM COATED ORAL at 18:24

## 2021-06-25 RX ADMIN — HEPARIN SODIUM 5000 UNIT(S): 5000 INJECTION INTRAVENOUS; SUBCUTANEOUS at 05:45

## 2021-06-25 RX ADMIN — CARVEDILOL PHOSPHATE 6.25 MILLIGRAM(S): 80 CAPSULE, EXTENDED RELEASE ORAL at 16:46

## 2021-06-25 RX ADMIN — OXYCODONE HYDROCHLORIDE 2.5 MILLIGRAM(S): 5 TABLET ORAL at 14:41

## 2021-06-25 RX ADMIN — AMLODIPINE BESYLATE 10 MILLIGRAM(S): 2.5 TABLET ORAL at 05:45

## 2021-06-25 RX ADMIN — Medication 100 MILLIGRAM(S): at 14:40

## 2021-06-25 RX ADMIN — Medication 300 MILLIGRAM(S): at 14:41

## 2021-06-25 RX ADMIN — HEPARIN SODIUM 5000 UNIT(S): 5000 INJECTION INTRAVENOUS; SUBCUTANEOUS at 16:45

## 2021-06-25 NOTE — RAPID RESPONSE TEAM SUMMARY - NSOTHERINTERVENTIONSRRT_GEN_ALL_CORE
CBC, BMP, lactate, Keppra level drawn. STAT CTH ordered. Primary team at bedside. Confirmed they will follow up labs and imaging and will consult neurology.
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
## 2021-06-25 NOTE — PHARMACOTHERAPY INTERVENTION NOTE - COMMENTS
Keppra 125 mg PO QHS was ordered by MD, however based on patient's H&P was on 125 mg PO TIW after HD.  Informed MD and MD changed frequency to TIW.

## 2021-06-25 NOTE — PROVIDER CONTACT NOTE (MEDICATION) - SITUATION
scheduled po keppra at 22, as well as 0600 IVPB keppra ordered, unclear if both doses should be administered

## 2021-06-25 NOTE — ED ADULT NURSE REASSESSMENT NOTE - NS ED NURSE REASSESS COMMENT FT1
Report given to Tracie BURGER in Dialysis. Per Tracie BURGER, nephrologist needs to get consent for dialysis from pt, she states she will call nephrologist to come down and have pt sign consent. Pts son is at bedside and he states he can sign consent for pt if needed.
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
## 2021-06-26 LAB
ANION GAP SERPL CALC-SCNC: 19 MMOL/L — HIGH (ref 5–17)
BUN SERPL-MCNC: 58 MG/DL — HIGH (ref 7–23)
CALCIUM SERPL-MCNC: 8.8 MG/DL — SIGNIFICANT CHANGE UP (ref 8.4–10.5)
CHLORIDE SERPL-SCNC: 92 MMOL/L — LOW (ref 96–108)
CO2 SERPL-SCNC: 20 MMOL/L — LOW (ref 22–31)
COVID-19 SPIKE DOMAIN AB INTERP: POSITIVE
COVID-19 SPIKE DOMAIN ANTIBODY RESULT: >250 U/ML — HIGH
CREAT SERPL-MCNC: 7.51 MG/DL — HIGH (ref 0.5–1.3)
GLUCOSE BLDC GLUCOMTR-MCNC: 117 MG/DL — HIGH (ref 70–99)
GLUCOSE BLDC GLUCOMTR-MCNC: 134 MG/DL — HIGH (ref 70–99)
GLUCOSE BLDC GLUCOMTR-MCNC: 150 MG/DL — HIGH (ref 70–99)
GLUCOSE BLDC GLUCOMTR-MCNC: 265 MG/DL — HIGH (ref 70–99)
GLUCOSE SERPL-MCNC: 175 MG/DL — HIGH (ref 70–99)
POTASSIUM SERPL-MCNC: 4.4 MMOL/L — SIGNIFICANT CHANGE UP (ref 3.5–5.3)
POTASSIUM SERPL-SCNC: 4.4 MMOL/L — SIGNIFICANT CHANGE UP (ref 3.5–5.3)
SARS-COV-2 IGG+IGM SERPL QL IA: >250 U/ML — HIGH
SARS-COV-2 IGG+IGM SERPL QL IA: POSITIVE
SODIUM SERPL-SCNC: 131 MMOL/L — LOW (ref 135–145)

## 2021-06-26 RX ORDER — LEVETIRACETAM 250 MG/1
500 TABLET, FILM COATED ORAL EVERY 12 HOURS
Refills: 0 | Status: DISCONTINUED | OUTPATIENT
Start: 2021-06-26 | End: 2021-07-01

## 2021-06-26 RX ORDER — LIDOCAINE 4 G/100G
1 CREAM TOPICAL ONCE
Refills: 0 | Status: COMPLETED | OUTPATIENT
Start: 2021-06-26 | End: 2021-06-26

## 2021-06-26 RX ORDER — ATORVASTATIN CALCIUM 80 MG/1
40 TABLET, FILM COATED ORAL AT BEDTIME
Refills: 0 | Status: DISCONTINUED | OUTPATIENT
Start: 2021-06-26 | End: 2021-07-01

## 2021-06-26 RX ADMIN — ATORVASTATIN CALCIUM 40 MILLIGRAM(S): 80 TABLET, FILM COATED ORAL at 22:54

## 2021-06-26 RX ADMIN — Medication 650 MILLIGRAM(S): at 16:30

## 2021-06-26 RX ADMIN — LEVETIRACETAM 400 MILLIGRAM(S): 250 TABLET, FILM COATED ORAL at 05:18

## 2021-06-26 RX ADMIN — Medication 81 MILLIGRAM(S): at 12:09

## 2021-06-26 RX ADMIN — Medication 3: at 12:14

## 2021-06-26 RX ADMIN — HEPARIN SODIUM 5000 UNIT(S): 5000 INJECTION INTRAVENOUS; SUBCUTANEOUS at 22:54

## 2021-06-26 RX ADMIN — AMLODIPINE BESYLATE 10 MILLIGRAM(S): 2.5 TABLET ORAL at 05:18

## 2021-06-26 RX ADMIN — OXYCODONE HYDROCHLORIDE 2.5 MILLIGRAM(S): 5 TABLET ORAL at 22:54

## 2021-06-26 RX ADMIN — Medication 50 MICROGRAM(S): at 05:18

## 2021-06-26 RX ADMIN — Medication 300 MILLIGRAM(S): at 12:09

## 2021-06-26 RX ADMIN — OXYCODONE HYDROCHLORIDE 2.5 MILLIGRAM(S): 5 TABLET ORAL at 12:08

## 2021-06-26 RX ADMIN — Medication 650 MILLIGRAM(S): at 16:04

## 2021-06-26 RX ADMIN — Medication 100 MILLIGRAM(S): at 12:09

## 2021-06-26 RX ADMIN — CARVEDILOL PHOSPHATE 6.25 MILLIGRAM(S): 80 CAPSULE, EXTENDED RELEASE ORAL at 05:18

## 2021-06-26 RX ADMIN — OXYCODONE HYDROCHLORIDE 2.5 MILLIGRAM(S): 5 TABLET ORAL at 23:55

## 2021-06-26 RX ADMIN — OXYCODONE HYDROCHLORIDE 2.5 MILLIGRAM(S): 5 TABLET ORAL at 12:30

## 2021-06-26 RX ADMIN — LEVETIRACETAM 400 MILLIGRAM(S): 250 TABLET, FILM COATED ORAL at 22:54

## 2021-06-26 RX ADMIN — HEPARIN SODIUM 5000 UNIT(S): 5000 INJECTION INTRAVENOUS; SUBCUTANEOUS at 05:18

## 2021-06-26 RX ADMIN — Medication 650 MILLIGRAM(S): at 02:58

## 2021-06-26 RX ADMIN — LOSARTAN POTASSIUM 50 MILLIGRAM(S): 100 TABLET, FILM COATED ORAL at 09:42

## 2021-06-26 RX ADMIN — Medication 650 MILLIGRAM(S): at 03:58

## 2021-06-26 RX ADMIN — LIDOCAINE 1 PATCH: 4 CREAM TOPICAL at 23:39

## 2021-06-26 NOTE — PHYSICAL THERAPY INITIAL EVALUATION ADULT - ADDITIONAL COMMENTS
6/25/21 CT HEAD: No acute intracranial hemorrhage, brain edema, or mass effect. Redemonstration of multifocal bilateral cerebral hemisphere chronic infarcts. No displaced calvarial fracture. Increased size of extra calvarial soft tissue swelling and hematoma overlying the right lateral convexity.  XR PELVIS: Redemonstrated right superior and inferior pubic rami fractures. No dislocations or additional fractures. No sacroiliac or pubic symphysis diastases or malalignment. Preserved hip joint spaces. Generalized osteopenia otherwise no discrete lytic or blastic lesions.

## 2021-06-26 NOTE — PHYSICAL THERAPY INITIAL EVALUATION ADULT - LIVES WITH, PROFILE
Pt lives with spouse in private home with 2-3 stairs to entrance +HR and bedroom and bathroom on first floor. Prior to admission Prior to admission pt independent with all functional mobility including ambulation without AD in home and cane or walker in community../spouse
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
## 2021-06-26 NOTE — PHYSICAL THERAPY INITIAL EVALUATION ADULT - GAIT DEVIATIONS NOTED, PT EVAL
decreased WB to RLE/decreased pedro/decreased step length/decreased stride length/decreased weight-shifting ability
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
## 2021-06-26 NOTE — PHYSICAL THERAPY INITIAL EVALUATION ADULT - THERAPY FREQUENCY, PT EVAL
Subjective:      History was provided by the mother  Ap Newman is a 4 days female who was brought in for this well child visit  Birth History    Birth     Length: 18 5" (47 cm)     Weight: 2495 g (5 lb 8 oz)     HC 32 cm (12 6")    Apgar     One: 8     Five: 9    Delivery Method: Vaginal, Spontaneous    Gestation Age: 44 2/7 wks    Duration of Labor: 2nd: 34m     The following portions of the patient's history were reviewed and updated as appropriate: She  has no past medical history on file  She   Patient Active Problem List    Diagnosis Date Noted    Hypothermia in pediatric patient 2019     She  has no past surgical history on file  Her family history includes Asthma in her mother  She  has no tobacco, alcohol, and drug history on file  No current outpatient medications on file  No current facility-administered medications for this visit  She has No Known Allergies       Birthweight: 2495 g (5 lb 8 oz)  Discharge weight: 2356g  Weight change since birth: -7%    Hepatitis B vaccination:   Immunization History   Administered Date(s) Administered    Hep B, Adolescent or Pediatric 2019       Mother's blood type:   ABO Grouping   Date Value Ref Range Status   2019 A  Final     Rh Factor   Date Value Ref Range Status   2019 Positive  Final     Baby's blood type: No results found for: ABO, RH  Bilirubin:   Total Bilirubin   Date Value Ref Range Status   2019 (L) 6 00 - 7 00 mg/dL Final       Hearing screen:      CCHD screen:       Maternal Information   PTA medications:   No medications prior to admission  Maternal social history: None  Current Issues:  Current concerns: [employer] #[id number] (Cymro)  See other note for hypothermia  Review of  Issues:  Known potentially teratogenic medications used during pregnancy? no  Alcohol during pregnancy?  no  Tobacco during pregnancy? no  Other drugs during pregnancy? no  Other complications during pregnancy, labor, or delivery? yes - IUGR  Was mom Hepatitis B surface antigen positive? no    Review of Nutrition:  Current diet: breast milk  Current feeding patterns: 15-20 minutes every 2 hours  Difficulties with feeding? no  Current stooling frequency: 4-5 times a day, yellowish, seedy    Social Screening:  Current child-care arrangements: in home: primary caregiver is mother  Sibling relations: only child  Parental coping and self-care: doing well; no concerns  Secondhand smoke exposure? no          Objective:     Growth parameters are noted and are not appropriate for age  Wt Readings from Last 1 Encounters:   11/08/19 2325 g (5 lb 2 oz) (<1 %, Z= -2 44)*     * Growth percentiles are based on WHO (Girls, 0-2 years) data  Ht Readings from Last 1 Encounters:   11/08/19 18 75" (47 6 cm) (13 %, Z= -1 13)*     * Growth percentiles are based on WHO (Girls, 0-2 years) data  Head Circumference: 31 8 cm (12 5")    Vitals:    11/08/19 1355   Temp: (!) 97 2 °F (36 2 °C)   TempSrc: Rectal   Weight: 2325 g (5 lb 2 oz)   Height: 18 75" (47 6 cm)   HC: 31 8 cm (12 5")       Physical Exam   Constitutional: She appears well-developed and well-nourished  She is active  She has a strong cry  No distress  Small for gestational age   HENT:   Head: Anterior fontanelle is flat  No facial anomaly  Right Ear: Tympanic membrane normal    Left Ear: Tympanic membrane normal    Nose: Nose normal    Mouth/Throat: Mucous membranes are moist  Oropharynx is clear  Eyes: Red reflex is present bilaterally  Pupils are equal, round, and reactive to light  Conjunctivae and EOM are normal    Neck: Normal range of motion  Neck supple  Cardiovascular: Normal rate, S1 normal and S2 normal  Pulses are palpable  No murmur heard  Pulmonary/Chest: Effort normal and breath sounds normal  No nasal flaring  Abdominal: Soft  Bowel sounds are normal  There is no hepatosplenomegaly  No hernia     Musculoskeletal: Normal range of motion  Negative Ortolani and Hodges   Lymphadenopathy: No occipital adenopathy is present  She has no cervical adenopathy  Neurological: She is alert  She has normal strength and normal reflexes  Skin: Skin is warm and dry  Turgor is normal    Nursing note and vitals reviewed  Assessment:     4 days female infant  1  Health check for  under 11 days old     2  Hypothermia in pediatric patient         Plan:         1  Anticipatory guidance discussed  Gave handout on well-child issues at this age  Specific topics reviewed: call for jaundice, decreased feeding, or fever, impossible to "spoil" infants at this age, normal crying, sleep face up to decrease chances of SIDS, typical  feeding habits and umbilical cord stump care  2  Screening tests:   a  State  metabolic screen: pending  b  Hearing screen (OAE, ABR): negative    3  Ultrasound of the hips to screen for developmental dysplasia of the hip: not applicable    4  Immunizations today: Received hepatitis B in hospital      5  Follow-up visit in 1 week for next well child visit, or sooner as needed  2-3x/week

## 2021-06-26 NOTE — PHYSICAL THERAPY INITIAL EVALUATION ADULT - PRECAUTIONS/LIMITATIONS, REHAB EVAL
Of note, pt on renal transplant list ([place]), needs renal transplant team to be notified. Course c/b 6/26 RRT for seizure activity, per Neuro back to baseline, VEEG pending./cardiac precautions/fall precautions

## 2021-06-26 NOTE — PHYSICAL THERAPY INITIAL EVALUATION ADULT - STRENGTHENING, PT EVAL
GOAL: Pt will improve RLE strength to 4/5, for increased limb stability, to improve gait and facilitate stair negotiation in 2 weeks.

## 2021-06-26 NOTE — PHYSICAL THERAPY INITIAL EVALUATION ADULT - DIAGNOSIS, PT EVAL
Pt p/w L hip/pelvis pain increased with activity; impaired strength, balance, ROM resulting in mobility deficits.
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
## 2021-06-27 LAB
A1C WITH ESTIMATED AVERAGE GLUCOSE RESULT: 6.5 % — HIGH (ref 4–5.6)
ANION GAP SERPL CALC-SCNC: 16 MMOL/L — SIGNIFICANT CHANGE UP (ref 5–17)
BUN SERPL-MCNC: 49 MG/DL — HIGH (ref 7–23)
CALCIUM SERPL-MCNC: 8.9 MG/DL — SIGNIFICANT CHANGE UP (ref 8.4–10.5)
CHLORIDE SERPL-SCNC: 97 MMOL/L — SIGNIFICANT CHANGE UP (ref 96–108)
CHOLEST SERPL-MCNC: 118 MG/DL — SIGNIFICANT CHANGE UP
CO2 SERPL-SCNC: 20 MMOL/L — LOW (ref 22–31)
CREAT SERPL-MCNC: 6.74 MG/DL — HIGH (ref 0.5–1.3)
ESTIMATED AVERAGE GLUCOSE: 140 MG/DL — HIGH (ref 68–114)
GLUCOSE BLDC GLUCOMTR-MCNC: 136 MG/DL — HIGH (ref 70–99)
GLUCOSE BLDC GLUCOMTR-MCNC: 290 MG/DL — HIGH (ref 70–99)
GLUCOSE SERPL-MCNC: 154 MG/DL — HIGH (ref 70–99)
HDLC SERPL-MCNC: 59 MG/DL — SIGNIFICANT CHANGE UP
LIPID PNL WITH DIRECT LDL SERPL: 48 MG/DL — SIGNIFICANT CHANGE UP
NON HDL CHOLESTEROL: 59 MG/DL — SIGNIFICANT CHANGE UP
POTASSIUM SERPL-MCNC: 4.3 MMOL/L — SIGNIFICANT CHANGE UP (ref 3.5–5.3)
POTASSIUM SERPL-SCNC: 4.3 MMOL/L — SIGNIFICANT CHANGE UP (ref 3.5–5.3)
SODIUM SERPL-SCNC: 133 MMOL/L — LOW (ref 135–145)
TRIGL SERPL-MCNC: 54 MG/DL — SIGNIFICANT CHANGE UP

## 2021-06-27 RX ADMIN — Medication 81 MILLIGRAM(S): at 12:47

## 2021-06-27 RX ADMIN — Medication 650 MILLIGRAM(S): at 12:48

## 2021-06-27 RX ADMIN — LIDOCAINE 1 PATCH: 4 CREAM TOPICAL at 08:15

## 2021-06-27 RX ADMIN — HEPARIN SODIUM 5000 UNIT(S): 5000 INJECTION INTRAVENOUS; SUBCUTANEOUS at 20:52

## 2021-06-27 RX ADMIN — LOSARTAN POTASSIUM 50 MILLIGRAM(S): 100 TABLET, FILM COATED ORAL at 05:19

## 2021-06-27 RX ADMIN — OXYCODONE HYDROCHLORIDE 2.5 MILLIGRAM(S): 5 TABLET ORAL at 21:30

## 2021-06-27 RX ADMIN — Medication 50 MICROGRAM(S): at 05:19

## 2021-06-27 RX ADMIN — OXYCODONE HYDROCHLORIDE 2.5 MILLIGRAM(S): 5 TABLET ORAL at 20:51

## 2021-06-27 RX ADMIN — LEVETIRACETAM 400 MILLIGRAM(S): 250 TABLET, FILM COATED ORAL at 12:46

## 2021-06-27 RX ADMIN — Medication 300 MILLIGRAM(S): at 12:47

## 2021-06-27 RX ADMIN — CARVEDILOL PHOSPHATE 6.25 MILLIGRAM(S): 80 CAPSULE, EXTENDED RELEASE ORAL at 18:02

## 2021-06-27 RX ADMIN — ATORVASTATIN CALCIUM 40 MILLIGRAM(S): 80 TABLET, FILM COATED ORAL at 20:52

## 2021-06-27 RX ADMIN — Medication 100 MILLIGRAM(S): at 12:47

## 2021-06-27 RX ADMIN — AMLODIPINE BESYLATE 10 MILLIGRAM(S): 2.5 TABLET ORAL at 05:19

## 2021-06-27 RX ADMIN — HEPARIN SODIUM 5000 UNIT(S): 5000 INJECTION INTRAVENOUS; SUBCUTANEOUS at 05:19

## 2021-06-27 RX ADMIN — HEPARIN SODIUM 5000 UNIT(S): 5000 INJECTION INTRAVENOUS; SUBCUTANEOUS at 12:51

## 2021-06-27 RX ADMIN — CARVEDILOL PHOSPHATE 6.25 MILLIGRAM(S): 80 CAPSULE, EXTENDED RELEASE ORAL at 05:19

## 2021-06-27 RX ADMIN — LEVETIRACETAM 400 MILLIGRAM(S): 250 TABLET, FILM COATED ORAL at 23:48

## 2021-06-27 RX ADMIN — LIDOCAINE 1 PATCH: 4 CREAM TOPICAL at 13:11

## 2021-06-27 RX ADMIN — Medication 3: at 18:01

## 2021-06-28 LAB
ANION GAP SERPL CALC-SCNC: 19 MMOL/L — HIGH (ref 5–17)
BUN SERPL-MCNC: 73 MG/DL — HIGH (ref 7–23)
CALCIUM SERPL-MCNC: 8.7 MG/DL — SIGNIFICANT CHANGE UP (ref 8.4–10.5)
CHLORIDE SERPL-SCNC: 97 MMOL/L — SIGNIFICANT CHANGE UP (ref 96–108)
CO2 SERPL-SCNC: 17 MMOL/L — LOW (ref 22–31)
CREAT SERPL-MCNC: 8.28 MG/DL — HIGH (ref 0.5–1.3)
GLUCOSE BLDC GLUCOMTR-MCNC: 199 MG/DL — HIGH (ref 70–99)
GLUCOSE BLDC GLUCOMTR-MCNC: 218 MG/DL — HIGH (ref 70–99)
GLUCOSE BLDC GLUCOMTR-MCNC: 250 MG/DL — HIGH (ref 70–99)
GLUCOSE SERPL-MCNC: 145 MG/DL — HIGH (ref 70–99)
HCT VFR BLD CALC: 36.4 % — LOW (ref 39–50)
HGB BLD-MCNC: 11.7 G/DL — LOW (ref 13–17)
MCHC RBC-ENTMCNC: 29 PG — SIGNIFICANT CHANGE UP (ref 27–34)
MCHC RBC-ENTMCNC: 32.1 GM/DL — SIGNIFICANT CHANGE UP (ref 32–36)
MCV RBC AUTO: 90.1 FL — SIGNIFICANT CHANGE UP (ref 80–100)
NRBC # BLD: 0 /100 WBCS — SIGNIFICANT CHANGE UP (ref 0–0)
PLATELET # BLD AUTO: 146 K/UL — LOW (ref 150–400)
POTASSIUM SERPL-MCNC: 4.7 MMOL/L — SIGNIFICANT CHANGE UP (ref 3.5–5.3)
POTASSIUM SERPL-SCNC: 4.7 MMOL/L — SIGNIFICANT CHANGE UP (ref 3.5–5.3)
RBC # BLD: 4.04 M/UL — LOW (ref 4.2–5.8)
RBC # FLD: 14.2 % — SIGNIFICANT CHANGE UP (ref 10.3–14.5)
SODIUM SERPL-SCNC: 133 MMOL/L — LOW (ref 135–145)
WBC # BLD: 6.89 K/UL — SIGNIFICANT CHANGE UP (ref 3.8–10.5)
WBC # FLD AUTO: 6.89 K/UL — SIGNIFICANT CHANGE UP (ref 3.8–10.5)

## 2021-06-28 RX ORDER — SENNA PLUS 8.6 MG/1
2 TABLET ORAL ONCE
Refills: 0 | Status: COMPLETED | OUTPATIENT
Start: 2021-06-28 | End: 2021-06-28

## 2021-06-28 RX ORDER — LOSARTAN POTASSIUM 100 MG/1
100 TABLET, FILM COATED ORAL DAILY
Refills: 0 | Status: DISCONTINUED | OUTPATIENT
Start: 2021-06-29 | End: 2021-07-01

## 2021-06-28 RX ADMIN — Medication 2: at 12:06

## 2021-06-28 RX ADMIN — OXYCODONE HYDROCHLORIDE 2.5 MILLIGRAM(S): 5 TABLET ORAL at 13:30

## 2021-06-28 RX ADMIN — OXYCODONE HYDROCHLORIDE 2.5 MILLIGRAM(S): 5 TABLET ORAL at 22:30

## 2021-06-28 RX ADMIN — LEVETIRACETAM 400 MILLIGRAM(S): 250 TABLET, FILM COATED ORAL at 12:09

## 2021-06-28 RX ADMIN — CARVEDILOL PHOSPHATE 6.25 MILLIGRAM(S): 80 CAPSULE, EXTENDED RELEASE ORAL at 17:49

## 2021-06-28 RX ADMIN — OXYCODONE HYDROCHLORIDE 2.5 MILLIGRAM(S): 5 TABLET ORAL at 21:50

## 2021-06-28 RX ADMIN — Medication 300 MILLIGRAM(S): at 12:05

## 2021-06-28 RX ADMIN — LOSARTAN POTASSIUM 50 MILLIGRAM(S): 100 TABLET, FILM COATED ORAL at 05:03

## 2021-06-28 RX ADMIN — OXYCODONE HYDROCHLORIDE 2.5 MILLIGRAM(S): 5 TABLET ORAL at 13:01

## 2021-06-28 RX ADMIN — Medication 50 MICROGRAM(S): at 05:03

## 2021-06-28 RX ADMIN — HEPARIN SODIUM 5000 UNIT(S): 5000 INJECTION INTRAVENOUS; SUBCUTANEOUS at 05:03

## 2021-06-28 RX ADMIN — Medication 100 MILLIGRAM(S): at 12:06

## 2021-06-28 RX ADMIN — Medication 650 MILLIGRAM(S): at 13:18

## 2021-06-28 RX ADMIN — HEPARIN SODIUM 5000 UNIT(S): 5000 INJECTION INTRAVENOUS; SUBCUTANEOUS at 15:18

## 2021-06-28 RX ADMIN — Medication 2: at 17:48

## 2021-06-28 RX ADMIN — AMLODIPINE BESYLATE 10 MILLIGRAM(S): 2.5 TABLET ORAL at 05:03

## 2021-06-28 RX ADMIN — CARVEDILOL PHOSPHATE 6.25 MILLIGRAM(S): 80 CAPSULE, EXTENDED RELEASE ORAL at 05:02

## 2021-06-28 RX ADMIN — HEPARIN SODIUM 5000 UNIT(S): 5000 INJECTION INTRAVENOUS; SUBCUTANEOUS at 21:49

## 2021-06-28 RX ADMIN — Medication 81 MILLIGRAM(S): at 12:05

## 2021-06-28 RX ADMIN — ATORVASTATIN CALCIUM 40 MILLIGRAM(S): 80 TABLET, FILM COATED ORAL at 21:49

## 2021-06-28 RX ADMIN — SENNA PLUS 2 TABLET(S): 8.6 TABLET ORAL at 21:50

## 2021-06-29 LAB
ANION GAP SERPL CALC-SCNC: 22 MMOL/L — HIGH (ref 5–17)
BLD GP AB SCN SERPL QL: NEGATIVE — SIGNIFICANT CHANGE UP
BUN SERPL-MCNC: 100 MG/DL — HIGH (ref 7–23)
CALCIUM SERPL-MCNC: 8.7 MG/DL — SIGNIFICANT CHANGE UP (ref 8.4–10.5)
CHLORIDE SERPL-SCNC: 91 MMOL/L — LOW (ref 96–108)
CO2 SERPL-SCNC: 17 MMOL/L — LOW (ref 22–31)
CREAT SERPL-MCNC: 10.02 MG/DL — HIGH (ref 0.5–1.3)
GLUCOSE BLDC GLUCOMTR-MCNC: 175 MG/DL — HIGH (ref 70–99)
GLUCOSE BLDC GLUCOMTR-MCNC: 176 MG/DL — HIGH (ref 70–99)
GLUCOSE BLDC GLUCOMTR-MCNC: 179 MG/DL — HIGH (ref 70–99)
GLUCOSE BLDC GLUCOMTR-MCNC: 226 MG/DL — HIGH (ref 70–99)
GLUCOSE SERPL-MCNC: 193 MG/DL — HIGH (ref 70–99)
HCT VFR BLD CALC: 34.5 % — LOW (ref 39–50)
HGB BLD-MCNC: 11.2 G/DL — LOW (ref 13–17)
LEVETIRACETAM SERPL-MCNC: 13.3 UG/ML — SIGNIFICANT CHANGE UP (ref 10–40)
MCHC RBC-ENTMCNC: 29.2 PG — SIGNIFICANT CHANGE UP (ref 27–34)
MCHC RBC-ENTMCNC: 32.5 GM/DL — SIGNIFICANT CHANGE UP (ref 32–36)
MCV RBC AUTO: 89.8 FL — SIGNIFICANT CHANGE UP (ref 80–100)
NRBC # BLD: 0 /100 WBCS — SIGNIFICANT CHANGE UP (ref 0–0)
PLATELET # BLD AUTO: 157 K/UL — SIGNIFICANT CHANGE UP (ref 150–400)
POTASSIUM SERPL-MCNC: 5 MMOL/L — SIGNIFICANT CHANGE UP (ref 3.5–5.3)
POTASSIUM SERPL-SCNC: 5 MMOL/L — SIGNIFICANT CHANGE UP (ref 3.5–5.3)
RBC # BLD: 3.84 M/UL — LOW (ref 4.2–5.8)
RBC # FLD: 14.2 % — SIGNIFICANT CHANGE UP (ref 10.3–14.5)
RH IG SCN BLD-IMP: POSITIVE — SIGNIFICANT CHANGE UP
SODIUM SERPL-SCNC: 130 MMOL/L — LOW (ref 135–145)
WBC # BLD: 7.05 K/UL — SIGNIFICANT CHANGE UP (ref 3.8–10.5)
WBC # FLD AUTO: 7.05 K/UL — SIGNIFICANT CHANGE UP (ref 3.8–10.5)

## 2021-06-29 PROCEDURE — 70551 MRI BRAIN STEM W/O DYE: CPT | Mod: 26

## 2021-06-29 PROCEDURE — 95816 EEG AWAKE AND DROWSY: CPT | Mod: 26

## 2021-06-29 RX ADMIN — LEVETIRACETAM 400 MILLIGRAM(S): 250 TABLET, FILM COATED ORAL at 00:45

## 2021-06-29 RX ADMIN — OXYCODONE HYDROCHLORIDE 2.5 MILLIGRAM(S): 5 TABLET ORAL at 22:06

## 2021-06-29 RX ADMIN — HEPARIN SODIUM 5000 UNIT(S): 5000 INJECTION INTRAVENOUS; SUBCUTANEOUS at 05:19

## 2021-06-29 RX ADMIN — OXYCODONE HYDROCHLORIDE 2.5 MILLIGRAM(S): 5 TABLET ORAL at 23:00

## 2021-06-29 RX ADMIN — Medication 50 MICROGRAM(S): at 05:19

## 2021-06-29 RX ADMIN — HEPARIN SODIUM 5000 UNIT(S): 5000 INJECTION INTRAVENOUS; SUBCUTANEOUS at 22:05

## 2021-06-29 RX ADMIN — CARVEDILOL PHOSPHATE 6.25 MILLIGRAM(S): 80 CAPSULE, EXTENDED RELEASE ORAL at 13:25

## 2021-06-29 RX ADMIN — Medication 300 MILLIGRAM(S): at 14:28

## 2021-06-29 RX ADMIN — ATORVASTATIN CALCIUM 40 MILLIGRAM(S): 80 TABLET, FILM COATED ORAL at 22:06

## 2021-06-29 RX ADMIN — LEVETIRACETAM 400 MILLIGRAM(S): 250 TABLET, FILM COATED ORAL at 14:22

## 2021-06-29 RX ADMIN — Medication 2: at 17:49

## 2021-06-29 RX ADMIN — Medication 81 MILLIGRAM(S): at 13:24

## 2021-06-29 RX ADMIN — Medication 1: at 13:23

## 2021-06-29 RX ADMIN — Medication 1: at 08:05

## 2021-06-29 RX ADMIN — LOSARTAN POTASSIUM 100 MILLIGRAM(S): 100 TABLET, FILM COATED ORAL at 13:24

## 2021-06-29 RX ADMIN — Medication 100 MILLIGRAM(S): at 13:25

## 2021-06-29 RX ADMIN — AMLODIPINE BESYLATE 10 MILLIGRAM(S): 2.5 TABLET ORAL at 13:25

## 2021-06-29 RX ADMIN — HEPARIN SODIUM 5000 UNIT(S): 5000 INJECTION INTRAVENOUS; SUBCUTANEOUS at 14:23
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
## 2021-06-30 ENCOUNTER — TRANSCRIPTION ENCOUNTER (OUTPATIENT)
Age: 66
End: 2021-06-30

## 2021-06-30 LAB
GLUCOSE BLDC GLUCOMTR-MCNC: 121 MG/DL — HIGH (ref 70–99)
GLUCOSE BLDC GLUCOMTR-MCNC: 200 MG/DL — HIGH (ref 70–99)
GLUCOSE BLDC GLUCOMTR-MCNC: 327 MG/DL — HIGH (ref 70–99)
GLUCOSE BLDC GLUCOMTR-MCNC: 77 MG/DL — SIGNIFICANT CHANGE UP (ref 70–99)

## 2021-06-30 RX ORDER — AMLODIPINE BESYLATE 2.5 MG/1
1 TABLET ORAL
Qty: 0 | Refills: 0 | DISCHARGE
Start: 2021-06-30

## 2021-06-30 RX ORDER — LOSARTAN POTASSIUM 100 MG/1
1 TABLET, FILM COATED ORAL
Qty: 0 | Refills: 0 | DISCHARGE
Start: 2021-06-30

## 2021-06-30 RX ORDER — ACETAMINOPHEN 500 MG
2 TABLET ORAL
Qty: 0 | Refills: 0 | DISCHARGE
Start: 2021-06-30

## 2021-06-30 RX ORDER — ATORVASTATIN CALCIUM 80 MG/1
1 TABLET, FILM COATED ORAL
Qty: 0 | Refills: 0 | DISCHARGE
Start: 2021-06-30

## 2021-06-30 RX ORDER — INSULIN LISPRO 100/ML
0 VIAL (ML) SUBCUTANEOUS
Qty: 0 | Refills: 0 | DISCHARGE
Start: 2021-06-30

## 2021-06-30 RX ORDER — OXYCODONE HYDROCHLORIDE 5 MG/1
2.5 TABLET ORAL
Qty: 0 | Refills: 0 | DISCHARGE
Start: 2021-06-30

## 2021-06-30 RX ADMIN — LOSARTAN POTASSIUM 100 MILLIGRAM(S): 100 TABLET, FILM COATED ORAL at 05:16

## 2021-06-30 RX ADMIN — HEPARIN SODIUM 5000 UNIT(S): 5000 INJECTION INTRAVENOUS; SUBCUTANEOUS at 05:16

## 2021-06-30 RX ADMIN — Medication 650 MILLIGRAM(S): at 10:25

## 2021-06-30 RX ADMIN — AMLODIPINE BESYLATE 10 MILLIGRAM(S): 2.5 TABLET ORAL at 05:16

## 2021-06-30 RX ADMIN — Medication 81 MILLIGRAM(S): at 11:49

## 2021-06-30 RX ADMIN — OXYCODONE HYDROCHLORIDE 2.5 MILLIGRAM(S): 5 TABLET ORAL at 10:24

## 2021-06-30 RX ADMIN — ATORVASTATIN CALCIUM 40 MILLIGRAM(S): 80 TABLET, FILM COATED ORAL at 21:14

## 2021-06-30 RX ADMIN — CARVEDILOL PHOSPHATE 6.25 MILLIGRAM(S): 80 CAPSULE, EXTENDED RELEASE ORAL at 00:01

## 2021-06-30 RX ADMIN — Medication 100 MILLIGRAM(S): at 11:50

## 2021-06-30 RX ADMIN — Medication 50 MICROGRAM(S): at 05:16

## 2021-06-30 RX ADMIN — CARVEDILOL PHOSPHATE 6.25 MILLIGRAM(S): 80 CAPSULE, EXTENDED RELEASE ORAL at 11:50

## 2021-06-30 RX ADMIN — Medication 300 MILLIGRAM(S): at 11:50

## 2021-06-30 RX ADMIN — HEPARIN SODIUM 5000 UNIT(S): 5000 INJECTION INTRAVENOUS; SUBCUTANEOUS at 13:10

## 2021-06-30 RX ADMIN — LEVETIRACETAM 400 MILLIGRAM(S): 250 TABLET, FILM COATED ORAL at 13:05

## 2021-06-30 RX ADMIN — Medication 650 MILLIGRAM(S): at 21:13

## 2021-06-30 RX ADMIN — Medication 4: at 12:15

## 2021-06-30 RX ADMIN — LEVETIRACETAM 400 MILLIGRAM(S): 250 TABLET, FILM COATED ORAL at 00:01

## 2021-06-30 RX ADMIN — Medication 650 MILLIGRAM(S): at 21:43

## 2021-06-30 RX ADMIN — HEPARIN SODIUM 5000 UNIT(S): 5000 INJECTION INTRAVENOUS; SUBCUTANEOUS at 21:14

## 2021-06-30 RX ADMIN — CARVEDILOL PHOSPHATE 6.25 MILLIGRAM(S): 80 CAPSULE, EXTENDED RELEASE ORAL at 21:15

## 2021-06-30 NOTE — DISCHARGE NOTE PROVIDER - CARE PROVIDERS DIRECT ADDRESSES
,DirectAddress_Unknown,[email or web address]

## 2021-06-30 NOTE — DISCHARGE NOTE PROVIDER - PROVIDER TOKENS
PROVIDER:[TOKEN:[[id number]],FOLLOWUP:[2 weeks]],PROVIDER:[TOKEN:[[id number]],FOLLOWUP:[1 week]],PROVIDER:[TOKEN:[[id number]],FOLLOWUP:[2 weeks]]
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
## 2021-06-30 NOTE — DISCHARGE NOTE PROVIDER - NSDCFUSCHEDAPPT_GEN_ALL_CORE_FT
CHAD DUNBAR ; 08/05/2021 ; [facility]PP Cardio [id number]  CHAD DUNBAR ; 09/14/2021 ; [facility] Nephro [address] [facility] [address]
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
## 2021-06-30 NOTE — DISCHARGE NOTE PROVIDER - NSDCMRMEDTOKEN_GEN_ALL_CORE_FT
acetaminophen 325 mg oral tablet: 2 tab(s) orally every 6 hours, As needed, Temp greater or equal to 38C (100.4F), Mild Pain (1 - 3), Moderate Pain (4 - 6)  amLODIPine 10 mg oral tablet: 1 tab(s) orally once a day  apixaban 2.5 mg oral tablet: 1 tab(s) orally 2 times a day  aspirin 81 mg oral delayed release tablet: 1 tab(s) orally once a day  atorvastatin 40 mg oral tablet: 1 tab(s) orally once a day (at bedtime)  atorvastatin 80 mg oral tablet: 1 tab(s) orally once a day (at bedtime)  carvedilol 6.25 mg oral tablet: 1 tab(s) orally every 12 hours  insulin lispro 100 units/mL injectable solution: 2 Unit(s) if Glucose 151 - 200  4 Unit(s) if Glucose 201 - 250  6 Unit(s) if Glucose 251 - 300  8 Unit(s) if Glucose 301 - 350  10 Unit(s) if Glucose 351 - 400  12 Unit(s) if Glucose Greater Than 400  isoniazid 300 mg oral tablet: 1 tab(s) orally once a day  levETIRAcetam: 125 milligram(s) orally 3 times a week after dialysis   levETIRAcetam 250 mg oral tablet: 2 tab(s) orally 2 times a day  levothyroxine 50 mcg (0.05 mg) oral tablet: 1 tab(s) orally once a day  losartan 100 mg oral tablet: 1 tab(s) orally once a day  oxyCODONE: 2.5 milligram(s) orally every 6 hours, As Needed  pyridoxine 100 mg oral tablet: 1 tab(s) orally once a day   acetaminophen 325 mg oral tablet: 2 tab(s) orally every 6 hours, As needed, Temp greater or equal to 38C (100.4F), Mild Pain (1 - 3), Moderate Pain (4 - 6)  amLODIPine 10 mg oral tablet: 1 tab(s) orally once a day  apixaban 2.5 mg oral tablet: 2 tab(s) orally 2 times a day  aspirin 81 mg oral delayed release tablet: 1 tab(s) orally once a day  atorvastatin 40 mg oral tablet: 1 tab(s) orally once a day (at bedtime)  carvedilol 6.25 mg oral tablet: 1 tab(s) orally every 12 hours  insulin lispro 100 units/mL injectable solution: 2 Unit(s) if Glucose 151 - 200  4 Unit(s) if Glucose 201 - 250  6 Unit(s) if Glucose 251 - 300  8 Unit(s) if Glucose 301 - 350  10 Unit(s) if Glucose 351 - 400  12 Unit(s) if Glucose Greater Than 400  isoniazid 300 mg oral tablet: 1 tab(s) orally once a day  levETIRAcetam 250 mg oral tablet: 2 tab(s) orally 2 times a day  levothyroxine 50 mcg (0.05 mg) oral tablet: 1 tab(s) orally once a day  losartan 100 mg oral tablet: 1 tab(s) orally once a day  oxyCODONE: 2.5 milligram(s) orally every 6 hours, As Needed  pyridoxine 100 mg oral tablet: 1 tab(s) orally once a day
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
## 2021-06-30 NOTE — DISCHARGE NOTE PROVIDER - CARE PROVIDER_API CALL
Nathan Dominguez)  Neurology; Vascular Neurology  [address] [facility] [address]  [place] [address]  Phone: [phone]  Fax: [phone]  Follow Up Time: 2 weeks    Marianne Flores)  Internal Medicine  [address] [place] [address]  Phone: [phone]  Fax: [phone]  Follow Up Time: 1 week    Aki Schwartz)  Orthopaedic Surgery  [address] [facility], [address]  [place] [address]  Phone: [phone]  Fax: [phone]  Follow Up Time: 2 weeks

## 2021-07-01 ENCOUNTER — TRANSCRIPTION ENCOUNTER (OUTPATIENT)
Age: 66
End: 2021-07-01

## 2021-07-01 VITALS — DIASTOLIC BLOOD PRESSURE: 63 MMHG | SYSTOLIC BLOOD PRESSURE: 177 MMHG

## 2021-07-01 LAB
ANION GAP SERPL CALC-SCNC: 21 MMOL/L — HIGH (ref 5–17)
BUN SERPL-MCNC: 91 MG/DL — HIGH (ref 7–23)
CALCIUM SERPL-MCNC: 8.6 MG/DL — SIGNIFICANT CHANGE UP (ref 8.4–10.5)
CHLORIDE SERPL-SCNC: 93 MMOL/L — LOW (ref 96–108)
CO2 SERPL-SCNC: 19 MMOL/L — LOW (ref 22–31)
CREAT SERPL-MCNC: 8.94 MG/DL — HIGH (ref 0.5–1.3)
GLUCOSE BLDC GLUCOMTR-MCNC: 137 MG/DL — HIGH (ref 70–99)
GLUCOSE BLDC GLUCOMTR-MCNC: 204 MG/DL — HIGH (ref 70–99)
GLUCOSE BLDC GLUCOMTR-MCNC: 398 MG/DL — HIGH (ref 70–99)
GLUCOSE SERPL-MCNC: 116 MG/DL — HIGH (ref 70–99)
POTASSIUM SERPL-MCNC: 5.6 MMOL/L — HIGH (ref 3.5–5.3)
POTASSIUM SERPL-SCNC: 5.6 MMOL/L — HIGH (ref 3.5–5.3)
SARS-COV-2 RNA SPEC QL NAA+PROBE: SIGNIFICANT CHANGE UP
SODIUM SERPL-SCNC: 133 MMOL/L — LOW (ref 135–145)

## 2021-07-01 PROCEDURE — U0005: CPT

## 2021-07-01 PROCEDURE — 86803 HEPATITIS C AB TEST: CPT

## 2021-07-01 PROCEDURE — 83735 ASSAY OF MAGNESIUM: CPT

## 2021-07-01 PROCEDURE — 73562 X-RAY EXAM OF KNEE 3: CPT

## 2021-07-01 PROCEDURE — 73130 X-RAY EXAM OF HAND: CPT

## 2021-07-01 PROCEDURE — 86850 RBC ANTIBODY SCREEN: CPT

## 2021-07-01 PROCEDURE — 72190 X-RAY EXAM OF PELVIS: CPT

## 2021-07-01 PROCEDURE — 99291 CRITICAL CARE FIRST HOUR: CPT | Mod: 25

## 2021-07-01 PROCEDURE — 83605 ASSAY OF LACTIC ACID: CPT

## 2021-07-01 PROCEDURE — 86769 SARS-COV-2 COVID-19 ANTIBODY: CPT

## 2021-07-01 PROCEDURE — 95816 EEG AWAKE AND DROWSY: CPT

## 2021-07-01 PROCEDURE — 85027 COMPLETE CBC AUTOMATED: CPT

## 2021-07-01 PROCEDURE — 86706 HEP B SURFACE ANTIBODY: CPT

## 2021-07-01 PROCEDURE — U0003: CPT

## 2021-07-01 PROCEDURE — 97116 GAIT TRAINING THERAPY: CPT

## 2021-07-01 PROCEDURE — 83036 HEMOGLOBIN GLYCOSYLATED A1C: CPT

## 2021-07-01 PROCEDURE — 90471 IMMUNIZATION ADMIN: CPT

## 2021-07-01 PROCEDURE — 86704 HEP B CORE ANTIBODY TOTAL: CPT

## 2021-07-01 PROCEDURE — 97162 PT EVAL MOD COMPLEX 30 MIN: CPT

## 2021-07-01 PROCEDURE — 90715 TDAP VACCINE 7 YRS/> IM: CPT

## 2021-07-01 PROCEDURE — 96374 THER/PROPH/DIAG INJ IV PUSH: CPT

## 2021-07-01 PROCEDURE — 80177 DRUG SCRN QUAN LEVETIRACETAM: CPT

## 2021-07-01 PROCEDURE — 73522 X-RAY EXAM HIPS BI 3-4 VIEWS: CPT

## 2021-07-01 PROCEDURE — 86900 BLOOD TYPING SEROLOGIC ABO: CPT

## 2021-07-01 PROCEDURE — 80048 BASIC METABOLIC PNL TOTAL CA: CPT

## 2021-07-01 PROCEDURE — 72125 CT NECK SPINE W/O DYE: CPT

## 2021-07-01 PROCEDURE — 70551 MRI BRAIN STEM W/O DYE: CPT

## 2021-07-01 PROCEDURE — 70450 CT HEAD/BRAIN W/O DYE: CPT

## 2021-07-01 PROCEDURE — 80053 COMPREHEN METABOLIC PANEL: CPT

## 2021-07-01 PROCEDURE — 72170 X-RAY EXAM OF PELVIS: CPT

## 2021-07-01 PROCEDURE — 99261: CPT

## 2021-07-01 PROCEDURE — 85025 COMPLETE CBC W/AUTO DIFF WBC: CPT

## 2021-07-01 PROCEDURE — 80061 LIPID PANEL: CPT

## 2021-07-01 PROCEDURE — 87340 HEPATITIS B SURFACE AG IA: CPT

## 2021-07-01 PROCEDURE — 71045 X-RAY EXAM CHEST 1 VIEW: CPT

## 2021-07-01 PROCEDURE — 86901 BLOOD TYPING SEROLOGIC RH(D): CPT

## 2021-07-01 PROCEDURE — 82962 GLUCOSE BLOOD TEST: CPT

## 2021-07-01 PROCEDURE — 84100 ASSAY OF PHOSPHORUS: CPT

## 2021-07-01 RX ADMIN — LEVETIRACETAM 400 MILLIGRAM(S): 250 TABLET, FILM COATED ORAL at 00:08

## 2021-07-01 RX ADMIN — CARVEDILOL PHOSPHATE 6.25 MILLIGRAM(S): 80 CAPSULE, EXTENDED RELEASE ORAL at 12:31

## 2021-07-01 RX ADMIN — Medication 5: at 12:30

## 2021-07-01 RX ADMIN — HEPARIN SODIUM 5000 UNIT(S): 5000 INJECTION INTRAVENOUS; SUBCUTANEOUS at 13:46

## 2021-07-01 RX ADMIN — Medication 81 MILLIGRAM(S): at 12:31

## 2021-07-01 RX ADMIN — AMLODIPINE BESYLATE 10 MILLIGRAM(S): 2.5 TABLET ORAL at 14:14

## 2021-07-01 RX ADMIN — HEPARIN SODIUM 5000 UNIT(S): 5000 INJECTION INTRAVENOUS; SUBCUTANEOUS at 05:32

## 2021-07-01 RX ADMIN — LEVETIRACETAM 400 MILLIGRAM(S): 250 TABLET, FILM COATED ORAL at 12:41

## 2021-07-01 RX ADMIN — Medication 100 MILLIGRAM(S): at 12:31

## 2021-07-01 RX ADMIN — Medication 300 MILLIGRAM(S): at 12:31

## 2021-07-01 RX ADMIN — LOSARTAN POTASSIUM 100 MILLIGRAM(S): 100 TABLET, FILM COATED ORAL at 14:13

## 2021-07-01 RX ADMIN — Medication 50 MICROGRAM(S): at 05:32

## 2021-07-01 NOTE — DISCHARGE NOTE NURSING/CASE MANAGEMENT/SOCIAL WORK - NSDCVIVACCINE_GEN_ALL_CORE_FT
Tdap; 24-Jun-2021 20:00; Marcela Horn (RN); [employer]; [id number] (Exp. Date: 04-Sep-2022); IntraMuscular; Deltoid Left.; 0.5 milliLiter(s); VIS (VIS Published: 09-May-2013, VIS Presented: 24-Jun-2021);

## 2021-07-01 NOTE — PROGRESS NOTE ADULT - PROBLEM SELECTOR PLAN 1
Per ortho non-operative and WBAT on right side.   PT consult noted, Rehab placement  Pain control with Tylenol & Oxycodone PRN  DVT PPx, until DOAC can be restarted
Per ortho non-operative and WBAT on right side.   PT consult noted, Rehab placement  Pain control with Tylenol & Oxycodone PRN  DOAC can be restarted  DC to rehab today
Per ortho non-operative and WBAT on right side.   PT consult noted, Rehab placement  Pain control with Tylenol & Oxycodone PRN  DVT PPx, until DOAC can be restarted
Per ortho non-operative and WBAT on right side.   PT consult. Pain control with Tylenol & Oxycodone PRN  DVT PPx, until DOAC can be restarted
Per ortho non-operative and WBAT on right side.   PT consult. Pain control with Tylenol & Oxycodone PRN  DVT PPx
Per ortho non-operative and WBAT on right side.   PT consult noted, Rehab placement  Pain control with Tylenol & Oxycodone PRN  DVT PPx, until DOAC can be restarted
Per ortho non-operative and WBAT on right side.   PT consult. Pain control with Tylenol & Oxycodone PRN  DVT PPx

## 2021-07-01 NOTE — PROGRESS NOTE ADULT - PROBLEM SELECTOR PLAN 8
FS TID AC & insulin sliding scale

## 2021-07-01 NOTE — DISCHARGE NOTE NURSING/CASE MANAGEMENT/SOCIAL WORK - PATIENT PORTAL LINK FT
You can access the FollowMyHealth Patient Portal offered by [facility] by registering at the following website: [email or web address]. By joining [employer]’s FollowMyHealth portal, you will also be able to view your health information using other applications (apps) compatible with our system.

## 2021-07-01 NOTE — PROGRESS NOTE ADULT - PROBLEM SELECTOR PLAN 7
Continue with Synthroid
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
## 2021-07-01 NOTE — PROGRESS NOTE ADULT - PROBLEM SELECTOR PLAN 3
Resolved  Dialyzed as per renal
Resolved  Dialyzed as per renal
Resolved  Dialyzed last night
Resolved  Dialyzed as per renal

## 2021-07-01 NOTE — PROGRESS NOTE ADULT - TIME BILLING
a/p
coordinating care with nephrology, ED, family
a/p
coordinating care with nephrology, ED, family
a/p

## 2021-07-01 NOTE — PROGRESS NOTE ADULT - PROVIDER SPECIALTY LIST ADULT
Neurology
Nephrology
Neurology
Nephrology
Internal Medicine
Neurology
Neurology
Nephrology
Internal Medicine
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
## 2021-07-01 NOTE — PROGRESS NOTE ADULT - PROBLEM SELECTOR PLAN 2
Pain control with Tylenol & Oxycodone PRN  Monitor for signs of concussion
Pain control with Tylenol & Oxycodone PRN  MRI brain pending  Monitor for signs of concussion
Pain control with Tylenol & Oxycodone PRN  MRI brain shows old multiple embolic areas.
Pain control with Tylenol & Oxycodone PRN  Monitor for signs of concussion  Patient is s/p Tdap
Pain control with Tylenol & Oxycodone PRN  MRI brain shows old multiple embolic areas.
Pain control with Tylenol & Oxycodone PRN  Monitor for signs of concussion
Pain control with Tylenol & Oxycodone PRN  Monitor for signs of concussion

## 2021-07-01 NOTE — PROGRESS NOTE ADULT - PROBLEM SELECTOR PLAN 9
Continue with Coreg, titrate up to control BP
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
## 2021-07-01 NOTE — PROGRESS NOTE ADULT - NSICDXPILOT_GEN_ALL_CORE
[place]

## 2021-07-01 NOTE — PROGRESS NOTE ADULT - PROBLEM SELECTOR PLAN 5
Continue with statin and baby aspirin

## 2021-08-01 NOTE — ED PROVIDER NOTE - RAPID ASSESSMENT
64 y/o M with pmhx A fib on Eliquis, DM, and ESRD presents from dialysis for elevated HR and potassium as per son. Pt just received his final dialysis pta. Pt has no complaints at this time.     **Pt seen in the waiting room via teletriage by Tyree Maldonado (MD)  documentation completed by Joanne Lopez. Patient to be sent to main ED for full medical evaluation. All orders placed to be followed by MD in main ED**
You may have an overuse injury. You may also have resolving shingles.     Can take tylenol 650mg every 6hrs as needed for pain.    Can also take motrin/ibuprofen 600mg every 6hrs as needed for pain (WARNING: Use may cause stomach issues/problems. Take with food. Prolonged use can also cause kidney issues.).     Follow up with primary doctor within 1-2 days.    Follow up with orthopedist. Can call [phone] to schedule appointment.     Return for fevers, persistent vomit, uncontrolled pain, focal weakness/numbness, worsening swelling, spreading redness.    Joint Pain    Joint pain (arthralgia) may be caused by many things. Joint pain is likely to go away when you follow instructions from your health care provider for relieving pain at home. However, joint pain can also be caused by conditions that require more treatment. Common causes of joint pain include:  Bruising in the area of the joint.  Injury caused by repeating certain movements too many times (overuse injury).  Age-related joint wear and tear (osteoarthritis).  Buildup of uric acid crystals in the joint (gout).  Inflammation of the joint (rheumatic disease).  Various other forms of arthritis.  Infections of the joint (septic arthritis) or of the bone (osteomyelitis).    Your health care provider may recommend that you take pain medicine or wear a supportive device like an elastic bandage, sling, or splint. If your joint pain continues, you may need lab or imaging tests to diagnose the cause of your joint pain.    Follow these instructions at home:  Managing pain, stiffness, and swelling   If directed, put ice on the painful area. Icing can help to relieve joint pain and swelling.  Put ice in a plastic bag.  Place a towel between your skin and the bag.  Leave the ice on for 20 minutes, 2–3 times a day.  If directed, apply heat to the painful area as often as told by your health care provider. Heat can reduce the stiffness of your muscles and joints. Use the heat source that your health care provider recommends, such as a moist heat pack or a heating pad.  Place a towel between your skin and the heat source.  Leave the heat on for 20–30 minutes.  Remove the heat if your skin turns bright red. This is especially important if you are unable to feel pain, heat, or cold. You may have a greater risk of getting burned.  Move your fingers or toes below the painful joint often. You can avoid stiffness and lessen swelling by doing this.  If possible, raise (elevate) the painful joint above the level of your heart while you are sitting or lying down. To do this, try putting a few pillows under the painful joint.    Activity   Rest the painful joint for as long as directed. Do not do anything that causes or worsens pain.  Begin exercising or stretching the affected area, as told by your health care provider. Ask your health care provider what types of exercise are safe for you.    If you have an elastic bandage, sling, or splint:   Wear the supportive device as told by your health care provider. Remove it only as told by your health care provider.  Loosen the device if your fingers or toes below the joint tingle, become numb, or turn cold and blue.  Keep the device clean.   Ask your health care provider if you should remove the device before bathing. You may need to cover it with a watertight covering when you take a bath or a shower.    General instructions   Take over-the-counter and prescription medicines only as told by your health care provider.  Do not use any products that contain nicotine or tobacco, such as cigarettes and e-cigarettes. If you need help quitting, ask your health care provider.  Keep all follow-up visits as told by your health care provider. This is important.  Contact a health care provider if:  You have pain that gets worse and does not get better with medicine.  Your joint pain does not improve within 3 days.  You have increased bruising or swelling.  You have a fever.  You lose 10 lb (4.5 kg) or more without trying.    Get help right away if:  You cannot move the joint.  Your fingers or toes tingle, become numb, or turn cold and blue.  You have a fever along with a joint that is red, warm, and swollen.    Summary  Joint pain (arthralgia) may be caused by many things.  Your health care provider may recommend that you take pain medicine or wear a supportive device like an elastic bandage, sling, or splint.  If your joint pain continues, you may need tests to diagnose the cause of your joint pain.  Take over-the-counter and prescription medicines only as told by your health care provider.

## 2021-08-05 ENCOUNTER — APPOINTMENT (OUTPATIENT)
Dept: CARDIOLOGY | Facility: CLINIC | Age: 66
End: 2021-08-05
Payer: MEDICARE

## 2021-08-05 ENCOUNTER — NON-APPOINTMENT (OUTPATIENT)
Age: 66
End: 2021-08-05

## 2021-08-05 VITALS
DIASTOLIC BLOOD PRESSURE: 64 MMHG | RESPIRATION RATE: 12 BRPM | HEIGHT: 70 IN | TEMPERATURE: 97.1 F | HEART RATE: 70 BPM | OXYGEN SATURATION: 97 % | BODY MASS INDEX: 20.9 KG/M2 | WEIGHT: 146 LBS | SYSTOLIC BLOOD PRESSURE: 178 MMHG

## 2021-08-05 PROCEDURE — 93000 ELECTROCARDIOGRAM COMPLETE: CPT

## 2021-08-05 PROCEDURE — 99214 OFFICE O/P EST MOD 30 MIN: CPT

## 2021-08-05 NOTE — DISCUSSION/SUMMARY
[FreeTextEntry1] : The patient is a 65-year-old gentleman diabetes mellitus, hypertension, hyperlipidemia, hypothyroidism, ESRD, coronary artery disease s/p stroke, afib, s/p 3V CABG, who is fluid overloaded and hypertensive.\par #1 CAD- s/p CABG, euvolemic, no angina or HF, continue PT\par #2 Htn- continue coreg 6.25mg bid, telmesartan back to 80mg and amlodipine 5mg, son monitors closely\par #3 ESRD- on dialysis,  There are no cardiac contraindications to proceeding with renal transplant.\par #4 DM- on insulin, low numbers\par #5 Afib- continue eliquis, no bleeding\par #6 Neuro- on keppra\par #7 Hypothyroid- continue levothyroxine 50mcg\par #8 ID- prophylactic treatment for latent Tb for six months complete, received both COVID vaccines\par \par
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
## 2021-08-26 ENCOUNTER — APPOINTMENT (OUTPATIENT)
Dept: NEPHROLOGY | Facility: CLINIC | Age: 66
End: 2021-08-26

## 2021-09-14 ENCOUNTER — LABORATORY RESULT (OUTPATIENT)
Age: 66
End: 2021-09-14

## 2021-09-14 ENCOUNTER — APPOINTMENT (OUTPATIENT)
Dept: NEPHROLOGY | Facility: CLINIC | Age: 66
End: 2021-09-14
Payer: COMMERCIAL

## 2021-09-14 VITALS
HEART RATE: 72 BPM | DIASTOLIC BLOOD PRESSURE: 70 MMHG | RESPIRATION RATE: 14 BRPM | BODY MASS INDEX: 20.37 KG/M2 | SYSTOLIC BLOOD PRESSURE: 140 MMHG | WEIGHT: 142 LBS

## 2021-09-14 DIAGNOSIS — Z76.82 AWAITING ORGAN TRANSPLANT STATUS: ICD-10-CM

## 2021-09-14 PROCEDURE — 99215 OFFICE O/P EST HI 40 MIN: CPT

## 2021-09-14 PROCEDURE — 99072 ADDL SUPL MATRL&STAF TM PHE: CPT
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
## 2021-09-14 NOTE — ASSESSMENT
[FreeTextEntry1] : .Mr. DUNBAR 65 year He is evaluated for kidney transplantation.\par Pre transplant/ESRD: Patient had significant events since last visit including CVA, CABG, now appears to have recovered well. He is receiving 3-4/week physical therapy. Functional status has improved.\par Medical risks: Cardiovascular risk, nutrition and functional status\par Diabetes Mellitus: Discussed implications. Continue follow up with endocrinologist\par Hypertension: Discussed implications. Continue follow up with primary physicians.\par Cardiac risk:  Reviewed. Will follow up with cardiologist prior to transplant\par Cancer screening: F/u PSA Colon abigail screening as per protocol. No known h/o neoplastic disease\par ID: Serology for acute and chronic viral infections.  .- update, reports no COVID exposure/disease. completed INH in July 2021, cleared by ID. He has had COVID vaccination. Moderna. 3 doses.\par Imaging: Renal/abdominal /chest /Iliac  imaging update as per protocol\par Consults: Nutrition, social work, cardiology, Transplant surgery.\par Reviewed factors affecting survival and morbidity while on wait list and reviewed zackery -operative and long-term risk factors affecting outcome in kidney transplantation.\par Details of transplant surgery, immunosuppression and its complications and benefits of live donor transplantation as well as variability in wait times across regions and multiple listing were discussed. KDPI >85% and PHS high risk criteria donors were discussed. Discussed factors affecting morbidity and mortality while on hemodialysis.\par Patient has potential live donor (none ) at present. \par Will proceed with updating work up and in person visit prior to activating on list for transplant  once work up is reviewed and found to be ok.\par Plan of care discussed with patient's son Chris \par Also discussed with transplant coordinator  Patient is clinically acceptable for transplant at present if he continues to maintain/improve functional status and nutritional status.\par

## 2021-09-15 LAB
ABO + RH PNL BLD: NORMAL
ALBUMIN SERPL ELPH-MCNC: 4.5 G/DL
ALP BLD-CCNC: 130 U/L
ALT SERPL-CCNC: 11 U/L
ANION GAP SERPL CALC-SCNC: 20 MMOL/L
APPEARANCE: CLEAR
AST SERPL-CCNC: 16 U/L
BASOPHILS # BLD AUTO: 0.05 K/UL
BASOPHILS NFR BLD AUTO: 0.9 %
BILIRUB SERPL-MCNC: 0.4 MG/DL
BILIRUBIN URINE: NEGATIVE
BLOOD URINE: NORMAL
BUN SERPL-MCNC: 48 MG/DL
C PEPTIDE SERPL-MCNC: 4.9 NG/ML
CALCIUM SERPL-MCNC: 9.3 MG/DL
CHLORIDE SERPL-SCNC: 98 MMOL/L
CHOLEST SERPL-MCNC: 119 MG/DL
CK SERPL-CCNC: 98 U/L
CO2 SERPL-SCNC: 23 MMOL/L
COLOR: NORMAL
COVID-19 SPIKE DOMAIN ANTIBODY INTERPRETATION: POSITIVE
CREAT SERPL-MCNC: 5.68 MG/DL
CREAT SPEC-SCNC: 35 MG/DL
CREAT/PROT UR: 12.8 RATIO
CRP SERPL-MCNC: 28 MG/L
EOSINOPHIL # BLD AUTO: 0.23 K/UL
EOSINOPHIL NFR BLD AUTO: 4 %
ERYTHROCYTE [SEDIMENTATION RATE] IN BLOOD BY WESTERGREN METHOD: 67 MM/HR
ESTIMATED AVERAGE GLUCOSE: 134 MG/DL
GLUCOSE QUALITATIVE U: ABNORMAL
GLUCOSE SERPL-MCNC: 123 MG/DL
HAV IGM SER QL: NONREACTIVE
HBA1C MFR BLD HPLC: 6.3 %
HBV CORE IGG+IGM SER QL: REACTIVE
HBV SURFACE AB SER QL: REACTIVE
HBV SURFACE AB SERPL IA-ACNC: >1000 MIU/ML
HBV SURFACE AG SER QL: NONREACTIVE
HCT VFR BLD CALC: 34.7 %
HCV AB SER QL: NONREACTIVE
HCV S/CO RATIO: 0.35 S/CO
HDLC SERPL-MCNC: 62 MG/DL
HEPATITIS A IGG ANTIBODY: REACTIVE
HGB BLD-MCNC: 10 G/DL
HIV1+2 AB SPEC QL IA.RAPID: NONREACTIVE
IMM GRANULOCYTES NFR BLD AUTO: 0.2 %
KETONES URINE: NEGATIVE
LDLC SERPL CALC-MCNC: 47 MG/DL
LEUKOCYTE ESTERASE URINE: NEGATIVE
LYMPHOCYTES # BLD AUTO: 1.85 K/UL
LYMPHOCYTES NFR BLD AUTO: 31.8 %
MAGNESIUM SERPL-MCNC: 2.3 MG/DL
MAN DIFF?: NORMAL
MCHC RBC-ENTMCNC: 28 PG
MCHC RBC-ENTMCNC: 28.8 GM/DL
MCV RBC AUTO: 97.2 FL
MONOCYTES # BLD AUTO: 0.35 K/UL
MONOCYTES NFR BLD AUTO: 6 %
NEUTROPHILS # BLD AUTO: 3.33 K/UL
NEUTROPHILS NFR BLD AUTO: 57.1 %
NITRITE URINE: NEGATIVE
NONHDLC SERPL-MCNC: 56 MG/DL
PH URINE: 8
PHOSPHATE SERPL-MCNC: 3.5 MG/DL
PLATELET # BLD AUTO: 248 K/UL
POTASSIUM SERPL-SCNC: 5.1 MMOL/L
PROT SERPL-MCNC: 8.1 G/DL
PROT UR-MCNC: 455 MG/DL
PROTEIN URINE: ABNORMAL
PSA SERPL-MCNC: 2.17 NG/ML
RBC # BLD: 3.57 M/UL
RBC # FLD: 18.3 %
SARS-COV-2 AB SERPL IA-ACNC: >250 U/ML
SODIUM SERPL-SCNC: 141 MMOL/L
SPECIFIC GRAVITY URINE: 1.01
T PALLIDUM AB SER QL IA: NEGATIVE
T3 SERPL-MCNC: 70 NG/DL
T4 FREE SERPL-MCNC: 1.4 NG/DL
TRIGL SERPL-MCNC: 47 MG/DL
TSH SERPL-ACNC: 2.95 UIU/ML
URATE SERPL-MCNC: 3.6 MG/DL
UROBILINOGEN URINE: NORMAL
WBC # FLD AUTO: 5.82 K/UL

## 2021-09-15 NOTE — PATIENT PROFILE ADULT - NSPROPTRIGHTNOTIFY_GEN_A_NUR
2nd recall letter sent to schedule for Needs US Carotid and ABIs prior to appointment, schedule with Trini Johnson declines

## 2021-09-16 LAB
CMV IGG SERPL QL: >10 U/ML
CMV IGG SERPL-IMP: POSITIVE
CMV IGM SERPL QL: <8 AU/ML
CMV IGM SERPL QL: NEGATIVE
EBV EA AB SER IA-ACNC: <5 U/ML
EBV EA AB TITR SER IF: POSITIVE
EBV EA IGG SER QL IA: >600 U/ML
EBV EA IGG SER-ACNC: NEGATIVE
EBV EA IGM SER IA-ACNC: NEGATIVE
EBV PATRN SPEC IB-IMP: NORMAL
EBV VCA IGG SER IA-ACNC: 15.7 U/ML
EBV VCA IGM SER QL IA: <10 U/ML
EPSTEIN-BARR VIRUS CAPSID ANTIGEN IGG: NEGATIVE
HSV 1+2 IGG SER IA-IMP: NEGATIVE
HSV 1+2 IGG SER IA-IMP: POSITIVE
HSV 1+2 IGG SER IA-IMP: POSITIVE
HSV1 IGG SER QL: 54.1 INDEX
HSV1 IGG SER QL: 54.1 INDEX
HSV2 IGG SER QL: 0.09 INDEX
ISOAGGLUTININ TITER, ANTI-A: 32
RUBV IGG FLD-ACNC: 8 INDEX
RUBV IGG SER-IMP: POSITIVE
T GONDII AB SER-IMP: NEGATIVE
T GONDII IGG SER QL: <3 IU/ML
VZV AB TITR SER: POSITIVE
VZV IGG SER IF-ACNC: 1914 INDEX

## 2021-09-21 LAB
HSV1 IGM SER QL: NEGATIVE
HSV2 AB FLD-ACNC: NEGATIVE
M TB IFN-G BLD-IMP: NEGATIVE
QUANTIFERON TB PLUS MITOGEN MINUS NIL: 3.22 IU/ML
QUANTIFERON TB PLUS NIL: 0.01 IU/ML
QUANTIFERON TB PLUS TB1 MINUS NIL: 0 IU/ML
QUANTIFERON TB PLUS TB2 MINUS NIL: 0 IU/ML

## 2021-10-05 ENCOUNTER — APPOINTMENT (OUTPATIENT)
Dept: ULTRASOUND IMAGING | Facility: IMAGING CENTER | Age: 66
End: 2021-10-05
Payer: COMMERCIAL

## 2021-10-05 ENCOUNTER — TRANSCRIPTION ENCOUNTER (OUTPATIENT)
Age: 66
End: 2021-10-05

## 2021-10-05 ENCOUNTER — APPOINTMENT (OUTPATIENT)
Dept: RADIOLOGY | Facility: IMAGING CENTER | Age: 66
End: 2021-10-05
Payer: COMMERCIAL

## 2021-10-05 ENCOUNTER — OUTPATIENT (OUTPATIENT)
Dept: OUTPATIENT SERVICES | Facility: HOSPITAL | Age: 66
LOS: 1 days | End: 2021-10-05
Payer: COMMERCIAL

## 2021-10-05 DIAGNOSIS — Z95.1 PRESENCE OF AORTOCORONARY BYPASS GRAFT: Chronic | ICD-10-CM

## 2021-10-05 DIAGNOSIS — Z76.82 AWAITING ORGAN TRANSPLANT STATUS: ICD-10-CM

## 2021-10-05 PROCEDURE — 93976 VASCULAR STUDY: CPT | Mod: 26

## 2021-10-05 PROCEDURE — 76700 US EXAM ABDOM COMPLETE: CPT | Mod: 26,59

## 2021-10-05 PROCEDURE — 71046 X-RAY EXAM CHEST 2 VIEWS: CPT

## 2021-10-05 PROCEDURE — 71046 X-RAY EXAM CHEST 2 VIEWS: CPT | Mod: 26

## 2021-10-05 PROCEDURE — 93975 VASCULAR STUDY: CPT

## 2021-10-05 PROCEDURE — 93976 VASCULAR STUDY: CPT

## 2021-10-05 PROCEDURE — 76700 US EXAM ABDOM COMPLETE: CPT

## 2021-10-18 ENCOUNTER — APPOINTMENT (OUTPATIENT)
Dept: ENDOCRINOLOGY | Facility: CLINIC | Age: 66
End: 2021-10-18
Payer: MEDICARE

## 2021-10-18 VITALS
TEMPERATURE: 97.6 F | HEIGHT: 70 IN | SYSTOLIC BLOOD PRESSURE: 160 MMHG | BODY MASS INDEX: 20.19 KG/M2 | OXYGEN SATURATION: 95 % | DIASTOLIC BLOOD PRESSURE: 72 MMHG | HEART RATE: 74 BPM | WEIGHT: 141 LBS

## 2021-10-18 PROCEDURE — 99214 OFFICE O/P EST MOD 30 MIN: CPT

## 2021-10-19 NOTE — ASSESSMENT
[FreeTextEntry1] : 65 year old male with recent history of CABG, ESRD ( on renal transplant list), DM Type 2 requiring insulin, hypothyroidism here for follow up of DM Type 2 \par \par DM Type 2 (HgA1C of 6.6%)\par -Off of Levemir \par -Humalog 1-4 units before meals \par -SS # 1 \par -Advised to cut back on high carb snacks \par -Will order eric for the patient to be able to manage glycemic variation \par -Hypoglcemia treatment discussed \par \par History of CABG\par -Goal HgA1C of 7%\par \par ESRD\par -Increased risk for hypoglycemia\par -Treatment has been discussed \par \par Hypothyroidism\par -Check TFTs \par -Continue Levothyroxine 50 mcg daily \par \par -Follow up in 4 months. \par

## 2021-10-19 NOTE — REVIEW OF SYSTEMS
[Fatigue] : no fatigue [Decreased Appetite] : appetite not decreased [Recent Weight Gain (___ Lbs)] : no recent weight gain [Recent Weight Loss (___ Lbs)] : no recent weight loss [Visual Field Defect] : no visual field defect [Dry Eyes] : no dryness

## 2021-10-19 NOTE — HISTORY OF PRESENT ILLNESS
[FreeTextEntry1] : Diabetes Follow-up Patient HPI\par \par Stent placement last June ( Dr Morejon) \par 08/2019: Stroke and was in rehab ( previously was COVID positive) \par He reported that CABG was recent 08/14/2020 \par Currently he is on a transplant renal \par Dialysis for the past year (M, W, F) \par \par \par Patient was in the hospital in 11/2020 at [facility] and primarily because of elevated potassium and tachycardia \par \par \par CC\par Patient referred by Dr. Corral for diabetes management.\par \par \par HPI:\par \par Duration of Diabetes: 30 years \par Is patient on Insulin? 10 years \par If yes, how long on insulin? \par \par List Current Medications for Glycemic control and the doses:\par 1- Off of Levemir \par 2- Novolog 3-4 units before meals, 5 units if over 200 ( and with a nepro shake) \par 3- \par \par SMBG (self monitored blood glucose) readings: \par - Name of glucometer: \par - How often does the patient check BG? \par - Does the patient keep a log? \par \par If detailed record is available, what is the range of most of the BG readings?\par - Before Breakfast: 100-150\par - Before Lunch: 200\par After dialysis: 110\par - Before Dinner: 160-180\par - Before Bedtime: 100-150\par \par \par \par Does patient get Hypoglycemic episodes? None recently \par If yes how frequent? \par Hoe low do the BG readings reach? 69-70\par When do most of those episodes occur? In the middle of night \par What symptoms does the patient get during those episodes? Hungry, sweating, heart palpitations \par \par Diabetic Complications: Is patient aware of having any of those complications?\par - Eyes: Retinopathy? Retinopathy- laser treatment ( 3 years ago) \par  	When was the last fully dilated eye exam? Last year ( 09/2021) - Dr. Verdin \par - Feet: 	Neuropathy? yes \par  	Foot Ulcers? No \par 	When was the last time patient saw a Podiatrist? No \par - Kidneys: Nephropathy? ESRD \par  \par \par \par Very careful with potasssium intake \par \par Diet: review patient's diet: \par Breakfast: Eggs, tea, white bread ( 2 slice), oatmeal, roti \par Lunch: 2 rotis, nikunj, vegetables \par Dinner: 2 roti, chicken, soup\par Snacks: Cheese, cookie with tea, fruits \par Juice or soda: Diet sprite \par \par \par \par Exercise: review patient exercise habits: Walking x 15 mins \par \par Symptoms: Write any symptoms, concerns and issues bothering the patient which have not be addressed above: \par No blurry vision\par \par \par

## 2021-11-04 ENCOUNTER — NON-APPOINTMENT (OUTPATIENT)
Age: 66
End: 2021-11-04

## 2021-11-04 ENCOUNTER — APPOINTMENT (OUTPATIENT)
Dept: CARDIOLOGY | Facility: CLINIC | Age: 66
End: 2021-11-04
Payer: MEDICARE

## 2021-11-04 ENCOUNTER — APPOINTMENT (OUTPATIENT)
Dept: CARDIOLOGY | Facility: CLINIC | Age: 66
End: 2021-11-04

## 2021-11-04 VITALS
HEART RATE: 75 BPM | DIASTOLIC BLOOD PRESSURE: 83 MMHG | SYSTOLIC BLOOD PRESSURE: 180 MMHG | RESPIRATION RATE: 14 BRPM | OXYGEN SATURATION: 95 %

## 2021-11-04 PROCEDURE — 99214 OFFICE O/P EST MOD 30 MIN: CPT

## 2021-11-04 PROCEDURE — 93000 ELECTROCARDIOGRAM COMPLETE: CPT

## 2021-11-04 RX ORDER — SENNOSIDES 8.6 MG/1
8.6 TABLET ORAL
Refills: 0 | Status: DISCONTINUED | COMMUNITY
Start: 2020-08-20 | End: 2021-11-04

## 2021-11-04 RX ORDER — ISONIAZID 300 MG/1
300 TABLET ORAL
Qty: 30 | Refills: 8 | Status: DISCONTINUED | COMMUNITY
Start: 2020-10-14 | End: 2021-11-04

## 2021-11-04 RX ORDER — TORSEMIDE 20 MG/1
20 TABLET ORAL DAILY
Qty: 60 | Refills: 0 | Status: DISCONTINUED | COMMUNITY
Start: 2020-09-10 | End: 2021-11-04

## 2021-11-04 RX ORDER — OMEPRAZOLE 20 MG/1
20 CAPSULE, DELAYED RELEASE ORAL
Qty: 90 | Refills: 0 | Status: DISCONTINUED | COMMUNITY
Start: 2020-09-10 | End: 2021-11-04

## 2021-11-04 RX ORDER — AMLODIPINE BESYLATE 5 MG/1
5 TABLET ORAL DAILY
Qty: 90 | Refills: 2 | Status: DISCONTINUED | COMMUNITY
Start: 2021-01-20 | End: 2021-11-04

## 2021-11-04 RX ORDER — ISONIAZID 100 MG/1
100 TABLET ORAL
Refills: 0 | Status: DISCONTINUED | COMMUNITY
End: 2021-11-04

## 2021-11-04 NOTE — DISCUSSION/SUMMARY
[FreeTextEntry1] : The patient is a 65-year-old gentleman diabetes mellitus, hypertension, hyperlipidemia, hypothyroidism, ESRD, coronary artery disease s/p stroke, afib, s/p 3V CABG, who is hypertensive.\par #1 CAD- s/p CABG, euvolemic, no angina or HF, continue PT\par #2 Htn- continue coreg 6.25mg bid, telmesartan 80mg and stop amlodipine 5mg, start hydralazine 10mg bid and increase to 20mg bid, son monitors closely\par #3 ESRD- on dialysis,  There are no cardiac contraindications to proceeding with renal transplant.\par #4 DM- on insulin, lower dose\par #5 Afib- continue eliquis, no bleeding\par #6 Neuro- on keppra\par #7 Hypothyroid- continue levothyroxine 50mcg\par #8 ID- prophylactic treatment for latent Tb for six months complete, received both COVID vaccines\par \par

## 2021-11-04 NOTE — HISTORY OF PRESENT ILLNESS
[FreeTextEntry1] : Srinivasan has had elevated blood pressures. Amlodipine at 5mg because of edema.l Started telmesartan 80mg but BP still high especially over the weekend. Received his Booster vaccine and flu vaccine. Turned down two kidneys. Walking on his own.

## 2021-11-10 ENCOUNTER — APPOINTMENT (OUTPATIENT)
Dept: CARDIOLOGY | Facility: CLINIC | Age: 66
End: 2021-11-10
Payer: COMMERCIAL

## 2021-11-10 VITALS
HEART RATE: 70 BPM | BODY MASS INDEX: 20.19 KG/M2 | WEIGHT: 141 LBS | SYSTOLIC BLOOD PRESSURE: 193 MMHG | HEIGHT: 70 IN | DIASTOLIC BLOOD PRESSURE: 87 MMHG | RESPIRATION RATE: 16 BRPM | TEMPERATURE: 97 F | OXYGEN SATURATION: 94 %

## 2021-11-10 PROCEDURE — 99072 ADDL SUPL MATRL&STAF TM PHE: CPT

## 2021-11-10 PROCEDURE — 99214 OFFICE O/P EST MOD 30 MIN: CPT

## 2021-11-10 NOTE — PROGRESS NOTE ADULT - I WAS PHYSICALLY PRESENT FOR THE KEY PORTIONS OF THE EVALUATION AND MANAGEMENT (E/M) SERVICE PROVIDED.  I AGREE WITH THE ABOVE HISTORY, PHYSICAL, AND PLAN WHICH I HAVE REVIEWED AND EDITED WHERE APPROPRIATE
Statement Selected
negative - no fever

## 2021-11-10 NOTE — PHYSICAL EXAM
[Well Developed] : well developed [Well Nourished] : well nourished [No Acute Distress] : no acute distress [Normal Conjunctiva] : normal conjunctiva [No Carotid Bruit] : no carotid bruit [Normal S1, S2] : normal S1, S2 [No Murmur] : no murmur [No Rub] : no rub [No Gallop] : no gallop [Clear Lung Fields] : clear lung fields [Good Air Entry] : good air entry [No Respiratory Distress] : no respiratory distress  [Soft] : abdomen soft [Non Tender] : non-tender [Normal Gait] : normal gait [No Cyanosis] : no cyanosis [No Rash] : no rash [No Skin Lesions] : no skin lesions [Moves all extremities] : moves all extremities [No Focal Deficits] : no focal deficits [Normal Speech] : normal speech [Alert and Oriented] : alert and oriented [de-identified] : elevated JVP [de-identified] : bilateral LE 1-2+ pitting edema L>R

## 2021-11-10 NOTE — DISCUSSION/SUMMARY
[Atrial Fibrillation] : atrial fibrillation [Controlled Ventricular Response] : controlled ventricular response [Coronary Artery Disease] : coronary artery disease [Stable] : stable [Hypertension] : hypertension [FreeTextEntry1] : \par Currently stable from a cardiovascular standpoint. Hypertensive today (due for dialysis). Appears volume overloaded. Stable CAD (s/p CABG x3 vessels in August 2020) with low normal LV systolic function. No ischemic or CHF symptoms. Rate-controlled atrial fibrillation. Continue current medications. Additional volume removal with dialysis as tolerated. At this time, patient is considered an acceptable risk from a cardiac standpoint for renal transplant. Regular follow up with primary cardiologist is advised. Follow up annually pre-transplant. Plan for stress testing early next year.

## 2021-11-10 NOTE — CARDIOLOGY SUMMARY
[de-identified] : \par 11/04/21 - atrial fibrillation, 74 bpm, LBBB\par  [de-identified] : \par 07/31/20 (regadenoson MIBI) - medium sized, moderate to severe defects in mid to distal anterior, mid to distal anterolateral walls that are predominantly reversible consistent with infarction with significant zackery-infarct ischemia, there is a small, mild defect in proximal to mid inferior wall that is reversible consistent with ischemia, LVEF 45%\par  [de-identified] : \par 11/04/20 - mild-mod MR, mod LAE, low normal LV systolic function, normal RV size and function, mild-mod TR, RVSP 48 mmHg\par  [de-identified] : \par 08/07/20 (CATH) - dLM 70%, oLAD 75%, pLAD 80% ISR, mCx 40%, patent OM1 stent, RCA nondominant, LVEF 60%\par 06/05/19 (PCI) - SYNERGY stent to pLAD 80%\par 06/05/19 (CATH) - pLAD 80% (iFR positive), patent OM1 stent (iFR negative), RCA nondominant, LVEF 60%\par  [de-identified] : \par 08/13/20 (CABG) - off-pump LIMA-LAD, SVG-OM, and SVG-Cx by Julian Guillory MD

## 2021-11-10 NOTE — REVIEW OF SYSTEMS
[Lower Ext Edema] : lower extremity edema [Negative] : Musculoskeletal [SOB] : no shortness of breath [Dyspnea on exertion] : not dyspnea during exertion [Chest Discomfort] : no chest discomfort [Leg Claudication] : no intermittent leg claudication [Palpitations] : no palpitations [Orthopnea] : no orthopnea [PND] : no PND [Syncope] : no syncope

## 2021-12-19 NOTE — OCCUPATIONAL THERAPY INITIAL EVALUATION ADULT - FINE MOTOR COORDINATION EXAM
Problem: Pain  Goal: #Acceptable pain level achieved/maintained at rest using NRS/Faces  Description: This goal is used for patients who can self-report.  Acceptable means the level is at or below the identified comfort/function goal.  Outcome: Outcome Not Met, Continue to Monitor  Goal: # Acceptable pain level achieved/maintained at rest using NRS/Faces without oversedation (opioid naive or PCA/Epidural infusion)  Description: This goal is used if Opioid-naïve or on PCA/Epidural Infusion.  Outcome: Outcome Not Met, Continue to Monitor  Goal: # Acceptable pain level achieved/maintained with activity using NRS/Faces  Description: This goal is used for patients who can self-report and are not achieving acceptable pain control during activity.  Outcome: Outcome Not Met, Continue to Monitor  Goal: Acceptable pain/comfort level is achieved/maintained at rest (based on Pain Behaviors Scale)  Description: This goal is used for patients who are not able to self-report pain and are assessed for pain using the Pain Behaviors Scale  Outcome: Outcome Not Met, Continue to Monitor  Goal: # Verbalizes understanding of pain management  Description: Documented in Patient Education Activity  Outcome: Outcome Not Met, Continue to Monitor     
  Problem: Pain  Goal: #Acceptable pain level achieved/maintained at rest using NRS/Faces  Description: This goal is used for patients who can self-report.  Acceptable means the level is at or below the identified comfort/function goal.  Outcome: Outcome Not Met, Continue to Monitor  Goal: # Acceptable pain level achieved/maintained at rest using NRS/Faces without oversedation (opioid naive or PCA/Epidural infusion)  Description: This goal is used if Opioid-naïve or on PCA/Epidural Infusion.  Outcome: Outcome Not Met, Continue to Monitor  Goal: # Acceptable pain level achieved/maintained with activity using NRS/Faces  Description: This goal is used for patients who can self-report and are not achieving acceptable pain control during activity.  Outcome: Outcome Not Met, Continue to Monitor  Goal: Acceptable pain/comfort level is achieved/maintained at rest (based on Pain Behaviors Scale)  Description: This goal is used for patients who are not able to self-report pain and are assessed for pain using the Pain Behaviors Scale  Outcome: Outcome Not Met, Continue to Monitor  Goal: # Verbalizes understanding of pain management  Description: Documented in Patient Education Activity  Outcome: Outcome Not Met, Continue to Monitor     
unable to assess 2* cognition

## 2021-12-31 NOTE — PRE-OP CHECKLIST - SURGICAL CONSENT
done
done
patient presents with viral symptoms. Swab sent. Well appearing. Discharged with pmd follow up and return precautions.
done

## 2022-01-01 ENCOUNTER — INPATIENT (INPATIENT)
Facility: HOSPITAL | Age: 67
LOS: 0 days | Discharge: HOME | End: 2022-11-10
Attending: INTERNAL MEDICINE | Admitting: INTERNAL MEDICINE

## 2022-01-01 ENCOUNTER — TRANSCRIPTION ENCOUNTER (OUTPATIENT)
Age: 67
End: 2022-01-01

## 2022-01-01 ENCOUNTER — LABORATORY RESULT (OUTPATIENT)
Age: 67
End: 2022-01-01

## 2022-01-01 ENCOUNTER — NON-APPOINTMENT (OUTPATIENT)
Age: 67
End: 2022-01-01

## 2022-01-01 ENCOUNTER — INPATIENT (INPATIENT)
Facility: HOSPITAL | Age: 67
LOS: 8 days | Discharge: HOME | DRG: 377 | End: 2022-02-12
Attending: INTERNAL MEDICINE | Admitting: INTERNAL MEDICINE
Payer: MEDICARE

## 2022-01-01 ENCOUNTER — APPOINTMENT (OUTPATIENT)
Dept: CARDIOLOGY | Facility: CLINIC | Age: 67
End: 2022-01-01

## 2022-01-01 ENCOUNTER — RX RENEWAL (OUTPATIENT)
Age: 67
End: 2022-01-01

## 2022-01-01 ENCOUNTER — APPOINTMENT (OUTPATIENT)
Dept: NEPHROLOGY | Facility: CLINIC | Age: 67
End: 2022-01-01

## 2022-01-01 ENCOUNTER — INPATIENT (INPATIENT)
Facility: HOSPITAL | Age: 67
LOS: 89 days | Discharge: NURSING, REHAB OR LONG-TERM CARE | DRG: 981 | End: 2022-09-08
Attending: TRANSPLANT SURGERY | Admitting: INTERNAL MEDICINE
Payer: MEDICARE

## 2022-01-01 ENCOUNTER — RESULT REVIEW (OUTPATIENT)
Age: 67
End: 2022-01-01

## 2022-01-01 ENCOUNTER — APPOINTMENT (OUTPATIENT)
Dept: NEUROLOGY | Facility: CLINIC | Age: 67
End: 2022-01-01

## 2022-01-01 ENCOUNTER — INPATIENT (INPATIENT)
Facility: HOSPITAL | Age: 67
LOS: 12 days | Discharge: NURSING, REHAB OR LONG-TERM CARE | DRG: 907 | End: 2022-05-11
Attending: TRANSPLANT SURGERY | Admitting: TRANSPLANT SURGERY
Payer: MEDICARE

## 2022-01-01 ENCOUNTER — INPATIENT (INPATIENT)
Facility: HOSPITAL | Age: 67
LOS: 0 days | Discharge: HOME | DRG: 811 | End: 2022-11-19
Attending: TRANSPLANT SURGERY | Admitting: TRANSPLANT SURGERY
Payer: COMMERCIAL

## 2022-01-01 ENCOUNTER — APPOINTMENT (OUTPATIENT)
Dept: TRANSPLANT | Facility: CLINIC | Age: 67
End: 2022-01-01

## 2022-01-01 ENCOUNTER — INPATIENT (INPATIENT)
Facility: HOSPITAL | Age: 67
LOS: 26 days | Discharge: HOME HEALTH | DRG: 949 | End: 2022-10-05
Attending: INTERNAL MEDICINE | Admitting: INTERNAL MEDICINE
Payer: MEDICARE

## 2022-01-01 ENCOUNTER — EMERGENCY (EMERGENCY)
Facility: HOSPITAL | Age: 67
LOS: 1 days | Discharge: HOME | End: 2022-01-01
Admitting: EMERGENCY MEDICINE
Payer: MEDICARE

## 2022-01-01 ENCOUNTER — APPOINTMENT (OUTPATIENT)
Dept: TRANSPLANT | Facility: CLINIC | Age: 67
End: 2022-01-01
Payer: MEDICARE

## 2022-01-01 ENCOUNTER — APPOINTMENT (OUTPATIENT)
Dept: ENDOCRINOLOGY | Facility: CLINIC | Age: 67
End: 2022-01-01
Payer: MEDICARE

## 2022-01-01 ENCOUNTER — APPOINTMENT (OUTPATIENT)
Dept: NEPHROLOGY | Facility: CLINIC | Age: 67
End: 2022-01-01
Payer: MEDICARE

## 2022-01-01 ENCOUNTER — OUTPATIENT (OUTPATIENT)
Dept: OUTPATIENT SERVICES | Facility: HOSPITAL | Age: 67
LOS: 1 days | End: 2022-01-01
Payer: MEDICARE

## 2022-01-01 ENCOUNTER — APPOINTMENT (OUTPATIENT)
Dept: GASTROENTEROLOGY | Facility: HOSPITAL | Age: 67
End: 2022-01-01

## 2022-01-01 ENCOUNTER — INPATIENT (INPATIENT)
Facility: HOSPITAL | Age: 67
LOS: 6 days | Discharge: HOME | DRG: 650 | End: 2022-04-28
Attending: TRANSPLANT SURGERY | Admitting: NEUROLOGICAL SURGERY
Payer: MEDICARE

## 2022-01-01 ENCOUNTER — EMERGENCY (EMERGENCY)
Facility: HOSPITAL | Age: 67
LOS: 1 days | Discharge: HOME | End: 2022-01-01
Attending: EMERGENCY MEDICINE | Admitting: EMERGENCY MEDICINE
Payer: MEDICARE

## 2022-01-01 ENCOUNTER — APPOINTMENT (OUTPATIENT)
Dept: THORACIC SURGERY | Facility: HOSPITAL | Age: 67
End: 2022-01-01

## 2022-01-01 ENCOUNTER — APPOINTMENT (OUTPATIENT)
Dept: GASTROENTEROLOGY | Facility: CLINIC | Age: 67
End: 2022-01-01
Payer: MEDICARE

## 2022-01-01 ENCOUNTER — APPOINTMENT (OUTPATIENT)
Dept: ENDOCRINOLOGY | Facility: CLINIC | Age: 67
End: 2022-01-01

## 2022-01-01 ENCOUNTER — APPOINTMENT (OUTPATIENT)
Dept: TRANSPLANT | Facility: HOSPITAL | Age: 67
End: 2022-01-01

## 2022-01-01 VITALS
RESPIRATION RATE: 18 BRPM | HEART RATE: 64 BPM | TEMPERATURE: 98 F | SYSTOLIC BLOOD PRESSURE: 195 MMHG | HEIGHT: 70 IN | WEIGHT: 135.8 LBS | OXYGEN SATURATION: 97 % | DIASTOLIC BLOOD PRESSURE: 84 MMHG

## 2022-01-01 VITALS
SYSTOLIC BLOOD PRESSURE: 117 MMHG | RESPIRATION RATE: 16 BRPM | HEIGHT: 70 IN | TEMPERATURE: 99 F | DIASTOLIC BLOOD PRESSURE: 56 MMHG | HEART RATE: 55 BPM | OXYGEN SATURATION: 100 %

## 2022-01-01 VITALS
DIASTOLIC BLOOD PRESSURE: 72 MMHG | RESPIRATION RATE: 18 BRPM | OXYGEN SATURATION: 100 % | SYSTOLIC BLOOD PRESSURE: 181 MMHG | HEART RATE: 62 BPM | TEMPERATURE: 98 F

## 2022-01-01 VITALS
DIASTOLIC BLOOD PRESSURE: 55 MMHG | SYSTOLIC BLOOD PRESSURE: 106 MMHG | BODY MASS INDEX: 20.04 KG/M2 | WEIGHT: 140 LBS | HEART RATE: 54 BPM | HEIGHT: 70 IN

## 2022-01-01 VITALS
BODY MASS INDEX: 22.9 KG/M2 | RESPIRATION RATE: 16 BRPM | OXYGEN SATURATION: 99 % | HEIGHT: 70 IN | HEART RATE: 56 BPM | SYSTOLIC BLOOD PRESSURE: 149 MMHG | TEMPERATURE: 97.5 F | DIASTOLIC BLOOD PRESSURE: 75 MMHG | WEIGHT: 160 LBS

## 2022-01-01 VITALS
DIASTOLIC BLOOD PRESSURE: 67 MMHG | HEART RATE: 61 BPM | HEIGHT: 69 IN | TEMPERATURE: 98 F | OXYGEN SATURATION: 98 % | SYSTOLIC BLOOD PRESSURE: 182 MMHG | RESPIRATION RATE: 18 BRPM | WEIGHT: 156.53 LBS

## 2022-01-01 VITALS
HEART RATE: 61 BPM | HEIGHT: 70 IN | SYSTOLIC BLOOD PRESSURE: 177 MMHG | DIASTOLIC BLOOD PRESSURE: 80 MMHG | BODY MASS INDEX: 23.62 KG/M2 | WEIGHT: 165 LBS

## 2022-01-01 VITALS
OXYGEN SATURATION: 100 % | HEART RATE: 56 BPM | HEIGHT: 70 IN | BODY MASS INDEX: 22.62 KG/M2 | DIASTOLIC BLOOD PRESSURE: 70 MMHG | TEMPERATURE: 97.2 F | SYSTOLIC BLOOD PRESSURE: 161 MMHG | RESPIRATION RATE: 16 BRPM | WEIGHT: 158 LBS

## 2022-01-01 VITALS
SYSTOLIC BLOOD PRESSURE: 158 MMHG | HEIGHT: 70 IN | TEMPERATURE: 98 F | OXYGEN SATURATION: 100 % | HEART RATE: 69 BPM | DIASTOLIC BLOOD PRESSURE: 66 MMHG | WEIGHT: 138.89 LBS | RESPIRATION RATE: 20 BRPM

## 2022-01-01 VITALS
OXYGEN SATURATION: 99 % | TEMPERATURE: 98 F | HEART RATE: 60 BPM | RESPIRATION RATE: 18 BRPM | DIASTOLIC BLOOD PRESSURE: 72 MMHG | SYSTOLIC BLOOD PRESSURE: 159 MMHG

## 2022-01-01 VITALS
DIASTOLIC BLOOD PRESSURE: 60 MMHG | OXYGEN SATURATION: 98 % | HEART RATE: 57 BPM | RESPIRATION RATE: 12 BRPM | SYSTOLIC BLOOD PRESSURE: 160 MMHG

## 2022-01-01 VITALS
DIASTOLIC BLOOD PRESSURE: 73 MMHG | RESPIRATION RATE: 15 BRPM | TEMPERATURE: 97 F | HEART RATE: 62 BPM | HEIGHT: 70 IN | WEIGHT: 137.35 LBS | SYSTOLIC BLOOD PRESSURE: 187 MMHG | OXYGEN SATURATION: 98 %

## 2022-01-01 VITALS
SYSTOLIC BLOOD PRESSURE: 147 MMHG | RESPIRATION RATE: 16 BRPM | DIASTOLIC BLOOD PRESSURE: 68 MMHG | OXYGEN SATURATION: 94 % | HEART RATE: 64 BPM | TEMPERATURE: 98 F

## 2022-01-01 VITALS
DIASTOLIC BLOOD PRESSURE: 63 MMHG | OXYGEN SATURATION: 96 % | RESPIRATION RATE: 20 BRPM | HEART RATE: 62 BPM | SYSTOLIC BLOOD PRESSURE: 128 MMHG | TEMPERATURE: 98 F

## 2022-01-01 VITALS
RESPIRATION RATE: 18 BRPM | OXYGEN SATURATION: 98 % | DIASTOLIC BLOOD PRESSURE: 96 MMHG | SYSTOLIC BLOOD PRESSURE: 142 MMHG | HEART RATE: 98 BPM | TEMPERATURE: 97 F | HEIGHT: 69 IN

## 2022-01-01 VITALS
DIASTOLIC BLOOD PRESSURE: 67 MMHG | HEIGHT: 70 IN | SYSTOLIC BLOOD PRESSURE: 125 MMHG | RESPIRATION RATE: 18 BRPM | TEMPERATURE: 98 F | HEART RATE: 63 BPM | WEIGHT: 151.9 LBS | OXYGEN SATURATION: 96 %

## 2022-01-01 VITALS
TEMPERATURE: 98 F | SYSTOLIC BLOOD PRESSURE: 161 MMHG | DIASTOLIC BLOOD PRESSURE: 69 MMHG | HEART RATE: 59 BPM | OXYGEN SATURATION: 98 % | RESPIRATION RATE: 18 BRPM

## 2022-01-01 VITALS
WEIGHT: 160.06 LBS | OXYGEN SATURATION: 97 % | HEIGHT: 70 IN | SYSTOLIC BLOOD PRESSURE: 146 MMHG | DIASTOLIC BLOOD PRESSURE: 68 MMHG | HEART RATE: 66 BPM | RESPIRATION RATE: 16 BRPM

## 2022-01-01 VITALS
HEART RATE: 53 BPM | HEIGHT: 70 IN | TEMPERATURE: 96 F | DIASTOLIC BLOOD PRESSURE: 57 MMHG | RESPIRATION RATE: 15 BRPM | OXYGEN SATURATION: 100 % | SYSTOLIC BLOOD PRESSURE: 110 MMHG | WEIGHT: 139.99 LBS

## 2022-01-01 VITALS
RESPIRATION RATE: 16 BRPM | HEART RATE: 60 BPM | TEMPERATURE: 97 F | DIASTOLIC BLOOD PRESSURE: 52 MMHG | OXYGEN SATURATION: 98 % | SYSTOLIC BLOOD PRESSURE: 122 MMHG

## 2022-01-01 VITALS
TEMPERATURE: 98 F | SYSTOLIC BLOOD PRESSURE: 168 MMHG | OXYGEN SATURATION: 100 % | HEIGHT: 70 IN | DIASTOLIC BLOOD PRESSURE: 69 MMHG | RESPIRATION RATE: 16 BRPM | BODY MASS INDEX: 23.62 KG/M2 | WEIGHT: 165 LBS | HEART RATE: 52 BPM

## 2022-01-01 VITALS
DIASTOLIC BLOOD PRESSURE: 84 MMHG | HEART RATE: 69 BPM | OXYGEN SATURATION: 98 % | SYSTOLIC BLOOD PRESSURE: 138 MMHG | RESPIRATION RATE: 18 BRPM | TEMPERATURE: 98 F

## 2022-01-01 VITALS
HEART RATE: 69 BPM | RESPIRATION RATE: 14 BRPM | SYSTOLIC BLOOD PRESSURE: 146 MMHG | OXYGEN SATURATION: 94 % | DIASTOLIC BLOOD PRESSURE: 66 MMHG | TEMPERATURE: 98 F

## 2022-01-01 VITALS
OXYGEN SATURATION: 100 % | RESPIRATION RATE: 18 BRPM | DIASTOLIC BLOOD PRESSURE: 73 MMHG | HEIGHT: 69 IN | SYSTOLIC BLOOD PRESSURE: 177 MMHG | TEMPERATURE: 99 F | HEART RATE: 74 BPM

## 2022-01-01 VITALS — DIASTOLIC BLOOD PRESSURE: 80 MMHG | SYSTOLIC BLOOD PRESSURE: 166 MMHG

## 2022-01-01 DIAGNOSIS — Z95.1 PRESENCE OF AORTOCORONARY BYPASS GRAFT: ICD-10-CM

## 2022-01-01 DIAGNOSIS — D64.9 ANEMIA, UNSPECIFIED: ICD-10-CM

## 2022-01-01 DIAGNOSIS — G40.209 LOCALIZATION-RELATED (FOCAL) (PARTIAL) SYMPTOMATIC EPILEPSY AND EPILEPTIC SYNDROMES WITH COMPLEX PARTIAL SEIZURES, NOT INTRACTABLE, W/OUT STATUS EPILEPTICUS: ICD-10-CM

## 2022-01-01 DIAGNOSIS — Z94.0 KIDNEY TRANSPLANT STATUS: ICD-10-CM

## 2022-01-01 DIAGNOSIS — N17.9 ACUTE KIDNEY FAILURE, UNSPECIFIED: ICD-10-CM

## 2022-01-01 DIAGNOSIS — Z79.4 LONG TERM (CURRENT) USE OF INSULIN: ICD-10-CM

## 2022-01-01 DIAGNOSIS — R09.89 OTHER SPECIFIED SYMPTOMS AND SIGNS INVOLVING THE CIRCULATORY AND RESPIRATORY SYSTEMS: ICD-10-CM

## 2022-01-01 DIAGNOSIS — A49.9 URINARY TRACT INFECTION, SITE NOT SPECIFIED: ICD-10-CM

## 2022-01-01 DIAGNOSIS — E11.22 TYPE 2 DIABETES MELLITUS WITH DIABETIC CHRONIC KIDNEY DISEASE: ICD-10-CM

## 2022-01-01 DIAGNOSIS — Z95.1 PRESENCE OF AORTOCORONARY BYPASS GRAFT: Chronic | ICD-10-CM

## 2022-01-01 DIAGNOSIS — Z79.52 LONG TERM (CURRENT) USE OF SYSTEMIC STEROIDS: ICD-10-CM

## 2022-01-01 DIAGNOSIS — E11.9 TYPE 2 DIABETES MELLITUS W/OUT COMPLICATIONS: ICD-10-CM

## 2022-01-01 DIAGNOSIS — R53.1 WEAKNESS: ICD-10-CM

## 2022-01-01 DIAGNOSIS — I48.91 UNSPECIFIED ATRIAL FIBRILLATION: ICD-10-CM

## 2022-01-01 DIAGNOSIS — N18.6 END STAGE RENAL DISEASE: ICD-10-CM

## 2022-01-01 DIAGNOSIS — R53.83 OTHER FATIGUE: ICD-10-CM

## 2022-01-01 DIAGNOSIS — Z79.899 OTHER LONG TERM (CURRENT) DRUG THERAPY: ICD-10-CM

## 2022-01-01 DIAGNOSIS — D50.0 IRON DEFICIENCY ANEMIA SECONDARY TO BLOOD LOSS (CHRONIC): ICD-10-CM

## 2022-01-01 DIAGNOSIS — R26.9 UNSPECIFIED ABNORMALITIES OF GAIT AND MOBILITY: ICD-10-CM

## 2022-01-01 DIAGNOSIS — E11.649 TYPE 2 DIABETES MELLITUS WITH HYPOGLYCEMIA WITHOUT COMA: ICD-10-CM

## 2022-01-01 DIAGNOSIS — G62.9 POLYNEUROPATHY, UNSPECIFIED: ICD-10-CM

## 2022-01-01 DIAGNOSIS — D63.1 ANEMIA IN CHRONIC KIDNEY DISEASE: ICD-10-CM

## 2022-01-01 DIAGNOSIS — E78.5 HYPERLIPIDEMIA, UNSPECIFIED: ICD-10-CM

## 2022-01-01 DIAGNOSIS — K92.2 GASTROINTESTINAL HEMORRHAGE, UNSPECIFIED: ICD-10-CM

## 2022-01-01 DIAGNOSIS — E87.5 HYPERKALEMIA: ICD-10-CM

## 2022-01-01 DIAGNOSIS — T86.12 KIDNEY TRANSPLANT FAILURE: ICD-10-CM

## 2022-01-01 DIAGNOSIS — I12.0 HYPERTENSIVE CHRONIC KIDNEY DISEASE WITH STAGE 5 CHRONIC KIDNEY DISEASE OR END STAGE RENAL DISEASE: ICD-10-CM

## 2022-01-01 DIAGNOSIS — R41.89 OTHER SYMPTOMS AND SIGNS INVOLVING COGNITIVE FUNCTIONS AND AWARENESS: ICD-10-CM

## 2022-01-01 DIAGNOSIS — Z99.2 END STAGE RENAL DISEASE: ICD-10-CM

## 2022-01-01 DIAGNOSIS — N39.0 URINARY TRACT INFECTION, SITE NOT SPECIFIED: ICD-10-CM

## 2022-01-01 DIAGNOSIS — N18.9 CHRONIC KIDNEY DISEASE, UNSPECIFIED: ICD-10-CM
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
## 2022-01-01 DIAGNOSIS — L89.152 PRESSURE ULCER OF SACRAL REGION, STAGE 2: ICD-10-CM

## 2022-01-01 DIAGNOSIS — Z99.2 TYPE 2 DIABETES MELLITUS WITH DIABETIC CHRONIC KIDNEY DISEASE: ICD-10-CM

## 2022-01-01 DIAGNOSIS — I10 ESSENTIAL (PRIMARY) HYPERTENSION: ICD-10-CM

## 2022-01-01 DIAGNOSIS — E11.9 TYPE 2 DIABETES MELLITUS WITHOUT COMPLICATIONS: ICD-10-CM

## 2022-01-01 DIAGNOSIS — Z11.52 ENCOUNTER FOR SCREENING FOR COVID-19: ICD-10-CM

## 2022-01-01 DIAGNOSIS — K64.8 OTHER HEMORRHOIDS: ICD-10-CM

## 2022-01-01 DIAGNOSIS — K27.9 PEPTIC ULCER, SITE UNSPECIFIED, UNSPECIFIED AS ACUTE OR CHRONIC, W/OUT HEMORRHAGE OR PERFORATION: ICD-10-CM

## 2022-01-01 DIAGNOSIS — I63.9 CEREBRAL INFARCTION, UNSPECIFIED: ICD-10-CM

## 2022-01-01 DIAGNOSIS — N18.6 TYPE 2 DIABETES MELLITUS WITH DIABETIC CHRONIC KIDNEY DISEASE: ICD-10-CM

## 2022-01-01 DIAGNOSIS — E11.65 TYPE 2 DIABETES MELLITUS WITH HYPERGLYCEMIA: ICD-10-CM

## 2022-01-01 DIAGNOSIS — I25.10 ATHEROSCLEROTIC HEART DISEASE OF NATIVE CORONARY ARTERY W/OUT ANGINA PECTORIS: ICD-10-CM

## 2022-01-01 DIAGNOSIS — E03.9 HYPOTHYROIDISM, UNSPECIFIED: ICD-10-CM

## 2022-01-01 DIAGNOSIS — Z99.2 DEPENDENCE ON RENAL DIALYSIS: ICD-10-CM

## 2022-01-01 DIAGNOSIS — I25.10 ATHEROSCLEROTIC HEART DISEASE OF NATIVE CORONARY ARTERY WITHOUT ANGINA PECTORIS: ICD-10-CM

## 2022-01-01 DIAGNOSIS — I69.998 OTHER SEQUELAE FOLLOWING UNSPECIFIED CEREBROVASCULAR DISEASE: ICD-10-CM

## 2022-01-01 DIAGNOSIS — R56.9 UNSPECIFIED CONVULSIONS: ICD-10-CM

## 2022-01-01 DIAGNOSIS — J94.2 HEMOTHORAX: ICD-10-CM

## 2022-01-01 DIAGNOSIS — E43 UNSPECIFIED SEVERE PROTEIN-CALORIE MALNUTRITION: ICD-10-CM

## 2022-01-01 DIAGNOSIS — I48.0 PAROXYSMAL ATRIAL FIBRILLATION: ICD-10-CM

## 2022-01-01 DIAGNOSIS — R32 UNSPECIFIED URINARY INCONTINENCE: ICD-10-CM

## 2022-01-01 DIAGNOSIS — K92.1 MELENA: ICD-10-CM

## 2022-01-01 DIAGNOSIS — Z86.69 PERSONAL HISTORY OF OTHER DISEASES OF THE NERVOUS SYSTEM AND SENSE ORGANS: ICD-10-CM

## 2022-01-01 DIAGNOSIS — Z29.9 ENCOUNTER FOR PROPHYLACTIC MEASURES, UNSPECIFIED: ICD-10-CM

## 2022-01-01 DIAGNOSIS — D84.9 IMMUNODEFICIENCY, UNSPECIFIED: ICD-10-CM

## 2022-01-01 DIAGNOSIS — J96.01 ACUTE RESPIRATORY FAILURE WITH HYPOXIA: ICD-10-CM

## 2022-01-01 DIAGNOSIS — R51.9 HEADACHE, UNSPECIFIED: ICD-10-CM

## 2022-01-01 DIAGNOSIS — R19.5 OTHER FECAL ABNORMALITIES: ICD-10-CM

## 2022-01-01 DIAGNOSIS — K27.9 PEPTIC ULCER, SITE UNSPECIFIED, UNSPECIFIED AS ACUTE OR CHRONIC, WITHOUT HEMORRHAGE OR PERFORATION: ICD-10-CM

## 2022-01-01 DIAGNOSIS — Z79.01 LONG TERM (CURRENT) USE OF ANTICOAGULANTS: ICD-10-CM

## 2022-01-01 DIAGNOSIS — Z51.89 ENCOUNTER FOR OTHER SPECIFIED AFTERCARE: ICD-10-CM

## 2022-01-01 DIAGNOSIS — L03.90 CELLULITIS, UNSPECIFIED: ICD-10-CM

## 2022-01-01 DIAGNOSIS — K29.70 GASTRITIS, UNSPECIFIED, WITHOUT BLEEDING: ICD-10-CM

## 2022-01-01 DIAGNOSIS — Z96.0 PRESENCE OF UROGENITAL IMPLANTS: ICD-10-CM

## 2022-01-01 DIAGNOSIS — Z95.5 PRESENCE OF CORONARY ANGIOPLASTY IMPLANT AND GRAFT: ICD-10-CM

## 2022-01-01 DIAGNOSIS — R53.81 OTHER MALAISE: ICD-10-CM

## 2022-01-01 DIAGNOSIS — G40.901 EPILEPSY, UNSPECIFIED, NOT INTRACTABLE, WITH STATUS EPILEPTICUS: ICD-10-CM

## 2022-01-01 DIAGNOSIS — R41.3 OTHER AMNESIA: ICD-10-CM

## 2022-01-01 DIAGNOSIS — R33.9 RETENTION OF URINE, UNSPECIFIED: ICD-10-CM

## 2022-01-01 DIAGNOSIS — Z86.718 PERSONAL HISTORY OF OTHER VENOUS THROMBOSIS AND EMBOLISM: ICD-10-CM

## 2022-01-01 DIAGNOSIS — Z01.818 ENCOUNTER FOR OTHER PREPROCEDURAL EXAMINATION: ICD-10-CM

## 2022-01-01 DIAGNOSIS — D62 ACUTE POSTHEMORRHAGIC ANEMIA: ICD-10-CM

## 2022-01-01 DIAGNOSIS — R41.0 DISORIENTATION, UNSPECIFIED: ICD-10-CM

## 2022-01-01 DIAGNOSIS — Z79.4 TYPE 2 DIABETES MELLITUS WITH DIABETIC CHRONIC KIDNEY DISEASE: ICD-10-CM

## 2022-01-01 DIAGNOSIS — I48.20 CHRONIC ATRIAL FIBRILLATION, UNSPECIFIED: ICD-10-CM

## 2022-01-01 DIAGNOSIS — R49.0 DYSPHONIA: ICD-10-CM

## 2022-01-01 DIAGNOSIS — R60.0 LOCALIZED EDEMA: ICD-10-CM

## 2022-01-01 DIAGNOSIS — D63.8 ANEMIA IN OTHER CHRONIC DISEASES CLASSIFIED ELSEWHERE: ICD-10-CM

## 2022-01-01 DIAGNOSIS — G40.909 EPILEPSY, UNSPECIFIED, NOT INTRACTABLE, WITHOUT STATUS EPILEPTICUS: ICD-10-CM

## 2022-01-01 DIAGNOSIS — R47.1 DYSARTHRIA AND ANARTHRIA: ICD-10-CM

## 2022-01-01 DIAGNOSIS — M79.605 PAIN IN LEFT LEG: ICD-10-CM

## 2022-01-01 DIAGNOSIS — R53.2 FUNCTIONAL QUADRIPLEGIA: ICD-10-CM

## 2022-01-01 DIAGNOSIS — D84.821 IMMUNODEFICIENCY DUE TO DRUGS: ICD-10-CM

## 2022-01-01 LAB
% ALBUMIN: 52 % — SIGNIFICANT CHANGE UP
% ALPHA 1: 4.3 % — SIGNIFICANT CHANGE UP
% ALPHA 2: 9.8 % — SIGNIFICANT CHANGE UP
% BETA: 12.9 % — SIGNIFICANT CHANGE UP
% GAMMA: 21 % — SIGNIFICANT CHANGE UP
-  AMIKACIN: SIGNIFICANT CHANGE UP
-  AMIKACIN: SIGNIFICANT CHANGE UP
-  AMOXICILLIN/CLAVULANIC ACID: SIGNIFICANT CHANGE UP
-  AMOXICILLIN/CLAVULANIC ACID: SIGNIFICANT CHANGE UP
-  AMPICILLIN/SULBACTAM: SIGNIFICANT CHANGE UP
-  AMPICILLIN/SULBACTAM: SIGNIFICANT CHANGE UP
-  AMPICILLIN: SIGNIFICANT CHANGE UP
-  AMPICILLIN: SIGNIFICANT CHANGE UP
-  AZTREONAM: SIGNIFICANT CHANGE UP
-  AZTREONAM: SIGNIFICANT CHANGE UP
-  CEFAZOLIN: SIGNIFICANT CHANGE UP
-  CEFAZOLIN: SIGNIFICANT CHANGE UP
-  CEFEPIME: SIGNIFICANT CHANGE UP
-  CEFEPIME: SIGNIFICANT CHANGE UP
-  CEFTRIAXONE: SIGNIFICANT CHANGE UP
-  CEFTRIAXONE: SIGNIFICANT CHANGE UP
-  CIPROFLOXACIN: SIGNIFICANT CHANGE UP
-  CIPROFLOXACIN: SIGNIFICANT CHANGE UP
-  ERTAPENEM: SIGNIFICANT CHANGE UP
-  ERTAPENEM: SIGNIFICANT CHANGE UP
-  GENTAMICIN: SIGNIFICANT CHANGE UP
-  GENTAMICIN: SIGNIFICANT CHANGE UP
-  IMIPENEM: SIGNIFICANT CHANGE UP
-  IMIPENEM: SIGNIFICANT CHANGE UP
-  LEVOFLOXACIN: SIGNIFICANT CHANGE UP
-  LEVOFLOXACIN: SIGNIFICANT CHANGE UP
-  MEROPENEM: SIGNIFICANT CHANGE UP
-  MEROPENEM: SIGNIFICANT CHANGE UP
-  NITROFURANTOIN: SIGNIFICANT CHANGE UP
-  NITROFURANTOIN: SIGNIFICANT CHANGE UP
-  PIPERACILLIN/TAZOBACTAM: SIGNIFICANT CHANGE UP
-  PIPERACILLIN/TAZOBACTAM: SIGNIFICANT CHANGE UP
-  STAPHYLOCOCCUS EPIDERMIDIS, METHICILLIN RESISTANT: SIGNIFICANT CHANGE UP
-  STAPHYLOCOCCUS EPIDERMIDIS, METHICILLIN RESISTANT: SIGNIFICANT CHANGE UP
-  TIGECYCLINE: SIGNIFICANT CHANGE UP
-  TIGECYCLINE: SIGNIFICANT CHANGE UP
-  TOBRAMYCIN: SIGNIFICANT CHANGE UP
-  TOBRAMYCIN: SIGNIFICANT CHANGE UP
-  TRIMETHOPRIM/SULFAMETHOXAZOLE: SIGNIFICANT CHANGE UP
-  TRIMETHOPRIM/SULFAMETHOXAZOLE: SIGNIFICANT CHANGE UP
A FLAVUS AB FLD QL: NEGATIVE — SIGNIFICANT CHANGE UP
A NIGER AB FLD QL: NEGATIVE — SIGNIFICANT CHANGE UP
A NIGER AB FLD QL: NEGATIVE — SIGNIFICANT CHANGE UP
A1C WITH ESTIMATED AVERAGE GLUCOSE RESULT: 6 % — HIGH (ref 4–5.6)
A1C WITH ESTIMATED AVERAGE GLUCOSE RESULT: 6.4 % — HIGH (ref 4–5.6)
A1C WITH ESTIMATED AVERAGE GLUCOSE RESULT: 6.6 % — HIGH (ref 4–5.6)
A1C WITH ESTIMATED AVERAGE GLUCOSE RESULT: 7.2 % — HIGH (ref 4–5.6)
A1C WITH ESTIMATED AVERAGE GLUCOSE RESULT: 7.3 % — HIGH (ref 4–5.6)
A1C WITH ESTIMATED AVERAGE GLUCOSE RESULT: 7.6 % — HIGH (ref 4–5.6)
ACANTHOCYTES BLD QL SMEAR: SLIGHT — SIGNIFICANT CHANGE UP
ALBUMIN FLD-MCNC: 1 G/DL — SIGNIFICANT CHANGE UP
ALBUMIN SERPL ELPH-MCNC: 1.8 G/DL — LOW (ref 3.3–5)
ALBUMIN SERPL ELPH-MCNC: 1.9 G/DL — LOW (ref 3.3–5)
ALBUMIN SERPL ELPH-MCNC: 2 G/DL — LOW (ref 3.3–5)
ALBUMIN SERPL ELPH-MCNC: 2.1 G/DL — LOW (ref 3.3–5)
ALBUMIN SERPL ELPH-MCNC: 2.2 G/DL — LOW (ref 3.3–5)
ALBUMIN SERPL ELPH-MCNC: 2.3 G/DL — LOW (ref 3.3–5)
ALBUMIN SERPL ELPH-MCNC: 2.4 G/DL — LOW (ref 3.3–5)
ALBUMIN SERPL ELPH-MCNC: 2.5 G/DL — LOW (ref 3.3–5)
ALBUMIN SERPL ELPH-MCNC: 2.6 G/DL — LOW (ref 3.3–5)
ALBUMIN SERPL ELPH-MCNC: 2.7 G/DL — LOW (ref 3.3–5)
ALBUMIN SERPL ELPH-MCNC: 2.8 G/DL — LOW (ref 3.3–5)
ALBUMIN SERPL ELPH-MCNC: 2.9 G/DL — LOW (ref 3.3–5)
ALBUMIN SERPL ELPH-MCNC: 3 G/DL — LOW (ref 3.3–5)
ALBUMIN SERPL ELPH-MCNC: 3.1 G/DL — LOW (ref 3.3–5)
ALBUMIN SERPL ELPH-MCNC: 3.1 G/DL — LOW (ref 3.3–5)
ALBUMIN SERPL ELPH-MCNC: 3.2 G/DL
ALBUMIN SERPL ELPH-MCNC: 3.2 G/DL
ALBUMIN SERPL ELPH-MCNC: 3.2 G/DL — LOW (ref 3.3–5)
ALBUMIN SERPL ELPH-MCNC: 3.4 G/DL — SIGNIFICANT CHANGE UP (ref 3.3–5)
ALBUMIN SERPL ELPH-MCNC: 3.5 G/DL
ALBUMIN SERPL ELPH-MCNC: 3.5 G/DL
ALBUMIN SERPL ELPH-MCNC: 3.6 G/DL — SIGNIFICANT CHANGE UP (ref 3.6–5.5)
ALBUMIN SERPL ELPH-MCNC: 3.9 G/DL — SIGNIFICANT CHANGE UP (ref 3.3–5)
ALBUMIN SERPL ELPH-MCNC: 4 G/DL — SIGNIFICANT CHANGE UP (ref 3.3–5)
ALBUMIN SERPL ELPH-MCNC: 4.1 G/DL — SIGNIFICANT CHANGE UP (ref 3.3–5)
ALBUMIN/GLOB SERPL ELPH: 1.1 RATIO — SIGNIFICANT CHANGE UP
ALP BLD-CCNC: 111 U/L
ALP BLD-CCNC: 118 U/L
ALP BLD-CCNC: 126 U/L
ALP BONE SERPL-MCNC: 39 % — SIGNIFICANT CHANGE UP (ref 12–68)
ALP FLD-CCNC: 511 IU/L — HIGH (ref 44–121)
ALP INTEST CFR SERPL: 1 % — SIGNIFICANT CHANGE UP (ref 0–18)
ALP LIVER SERPL-CCNC: 60 % — SIGNIFICANT CHANGE UP (ref 13–88)
ALP SERPL-CCNC: 104 U/L — SIGNIFICANT CHANGE UP (ref 40–120)
ALP SERPL-CCNC: 109 U/L — SIGNIFICANT CHANGE UP (ref 40–120)
ALP SERPL-CCNC: 114 U/L — SIGNIFICANT CHANGE UP (ref 40–120)
ALP SERPL-CCNC: 118 U/L — SIGNIFICANT CHANGE UP (ref 40–120)
ALP SERPL-CCNC: 119 U/L — SIGNIFICANT CHANGE UP (ref 40–120)
ALP SERPL-CCNC: 124 U/L — HIGH (ref 40–120)
ALP SERPL-CCNC: 126 U/L — HIGH (ref 40–120)
ALP SERPL-CCNC: 127 U/L — HIGH (ref 40–120)
ALP SERPL-CCNC: 128 U/L — HIGH (ref 40–120)
ALP SERPL-CCNC: 129 U/L — HIGH (ref 40–120)
ALP SERPL-CCNC: 130 U/L — HIGH (ref 40–120)
ALP SERPL-CCNC: 130 U/L — HIGH (ref 40–120)
ALP SERPL-CCNC: 131 U/L — HIGH (ref 40–120)
ALP SERPL-CCNC: 132 U/L — HIGH (ref 40–120)
ALP SERPL-CCNC: 134 U/L — HIGH (ref 40–120)
ALP SERPL-CCNC: 134 U/L — HIGH (ref 40–120)
ALP SERPL-CCNC: 136 U/L — HIGH (ref 40–120)
ALP SERPL-CCNC: 137 U/L — HIGH (ref 40–120)
ALP SERPL-CCNC: 138 U/L — HIGH (ref 40–120)
ALP SERPL-CCNC: 138 U/L — HIGH (ref 40–120)
ALP SERPL-CCNC: 140 U/L — HIGH (ref 40–120)
ALP SERPL-CCNC: 140 U/L — HIGH (ref 40–120)
ALP SERPL-CCNC: 141 U/L — HIGH (ref 40–120)
ALP SERPL-CCNC: 144 U/L — HIGH (ref 40–120)
ALP SERPL-CCNC: 144 U/L — HIGH (ref 40–120)
ALP SERPL-CCNC: 149 U/L — HIGH (ref 40–120)
ALP SERPL-CCNC: 149 U/L — HIGH (ref 40–120)
ALP SERPL-CCNC: 150 U/L — HIGH (ref 40–120)
ALP SERPL-CCNC: 152 U/L — HIGH (ref 40–120)
ALP SERPL-CCNC: 153 U/L — HIGH (ref 40–120)
ALP SERPL-CCNC: 153 U/L — HIGH (ref 40–120)
ALP SERPL-CCNC: 154 U/L — HIGH (ref 40–120)
ALP SERPL-CCNC: 154 U/L — HIGH (ref 40–120)
ALP SERPL-CCNC: 157 U/L — HIGH (ref 40–120)
ALP SERPL-CCNC: 161 U/L — HIGH (ref 40–120)
ALP SERPL-CCNC: 223 U/L — HIGH (ref 40–120)
ALP SERPL-CCNC: 227 U/L — HIGH (ref 40–120)
ALP SERPL-CCNC: 231 U/L — HIGH (ref 40–120)
ALP SERPL-CCNC: 237 U/L — HIGH (ref 40–120)
ALP SERPL-CCNC: 243 U/L — HIGH (ref 40–120)
ALP SERPL-CCNC: 246 U/L — HIGH (ref 40–120)
ALP SERPL-CCNC: 249 U/L — HIGH (ref 40–120)
ALP SERPL-CCNC: 259 U/L — HIGH (ref 40–120)
ALP SERPL-CCNC: 260 U/L — HIGH (ref 40–120)
ALP SERPL-CCNC: 261 U/L — HIGH (ref 40–120)
ALP SERPL-CCNC: 271 U/L — HIGH (ref 40–120)
ALP SERPL-CCNC: 273 U/L — HIGH (ref 40–120)
ALP SERPL-CCNC: 275 U/L — HIGH (ref 40–120)
ALP SERPL-CCNC: 278 U/L — HIGH (ref 40–120)
ALP SERPL-CCNC: 281 U/L — HIGH (ref 40–120)
ALP SERPL-CCNC: 285 U/L — HIGH (ref 40–120)
ALP SERPL-CCNC: 286 U/L — HIGH (ref 40–120)
ALP SERPL-CCNC: 287 U/L — HIGH (ref 40–120)
ALP SERPL-CCNC: 287 U/L — HIGH (ref 40–120)
ALP SERPL-CCNC: 289 U/L — HIGH (ref 40–120)
ALP SERPL-CCNC: 290 U/L — HIGH (ref 40–120)
ALP SERPL-CCNC: 291 U/L — HIGH (ref 40–120)
ALP SERPL-CCNC: 291 U/L — HIGH (ref 40–120)
ALP SERPL-CCNC: 294 U/L — HIGH (ref 40–120)
ALP SERPL-CCNC: 295 U/L — HIGH (ref 40–120)
ALP SERPL-CCNC: 298 U/L — HIGH (ref 40–120)
ALP SERPL-CCNC: 301 U/L — HIGH (ref 40–120)
ALP SERPL-CCNC: 308 U/L — HIGH (ref 40–120)
ALP SERPL-CCNC: 312 U/L — HIGH (ref 40–120)
ALP SERPL-CCNC: 316 U/L — HIGH (ref 40–120)
ALP SERPL-CCNC: 317 U/L — HIGH (ref 40–120)
ALP SERPL-CCNC: 322 U/L — HIGH (ref 40–120)
ALP SERPL-CCNC: 330 U/L — HIGH (ref 40–120)
ALP SERPL-CCNC: 332 U/L — HIGH (ref 40–120)
ALP SERPL-CCNC: 337 U/L — HIGH (ref 40–120)
ALP SERPL-CCNC: 341 U/L — HIGH (ref 40–120)
ALP SERPL-CCNC: 343 U/L — HIGH (ref 40–120)
ALP SERPL-CCNC: 351 U/L — HIGH (ref 40–120)
ALP SERPL-CCNC: 352 U/L — HIGH (ref 40–120)
ALP SERPL-CCNC: 372 U/L — HIGH (ref 40–120)
ALP SERPL-CCNC: 379 U/L — HIGH (ref 40–120)
ALP SERPL-CCNC: 386 U/L — HIGH (ref 40–120)
ALP SERPL-CCNC: 396 U/L — HIGH (ref 40–120)
ALP SERPL-CCNC: 424 U/L — HIGH (ref 40–120)
ALP SERPL-CCNC: 434 U/L — HIGH (ref 40–120)
ALP SERPL-CCNC: 449 U/L — HIGH (ref 40–120)
ALP SERPL-CCNC: 450 U/L — HIGH (ref 40–120)
ALP SERPL-CCNC: 454 U/L — HIGH (ref 40–120)
ALP SERPL-CCNC: 477 U/L — HIGH (ref 40–120)
ALP SERPL-CCNC: 515 U/L — HIGH (ref 40–120)
ALP SERPL-CCNC: 524 U/L — HIGH (ref 40–120)
ALP SERPL-CCNC: 527 U/L — HIGH (ref 40–120)
ALP SERPL-CCNC: 531 U/L — HIGH (ref 40–120)
ALP SERPL-CCNC: 568 U/L — HIGH (ref 40–120)
ALP SERPL-CCNC: 577 U/L — HIGH (ref 40–120)
ALP SERPL-CCNC: 581 U/L — HIGH (ref 40–120)
ALP SERPL-CCNC: 582 U/L — HIGH (ref 40–120)
ALP SERPL-CCNC: 595 U/L — HIGH (ref 40–120)
ALP SERPL-CCNC: 626 U/L — HIGH (ref 40–120)
ALP SERPL-CCNC: 656 U/L — HIGH (ref 40–120)
ALP SERPL-CCNC: 669 U/L — HIGH (ref 40–120)
ALP SERPL-CCNC: 67 U/L — SIGNIFICANT CHANGE UP (ref 40–120)
ALP SERPL-CCNC: 67 U/L — SIGNIFICANT CHANGE UP (ref 40–120)
ALP SERPL-CCNC: 68 U/L — SIGNIFICANT CHANGE UP (ref 40–120)
ALP SERPL-CCNC: 69 U/L — SIGNIFICANT CHANGE UP (ref 40–120)
ALP SERPL-CCNC: 69 U/L — SIGNIFICANT CHANGE UP (ref 40–120)
ALP SERPL-CCNC: 70 U/L — SIGNIFICANT CHANGE UP (ref 40–120)
ALP SERPL-CCNC: 71 U/L — SIGNIFICANT CHANGE UP (ref 40–120)
ALP SERPL-CCNC: 71 U/L — SIGNIFICANT CHANGE UP (ref 40–120)
ALP SERPL-CCNC: 72 U/L — SIGNIFICANT CHANGE UP (ref 40–120)
ALP SERPL-CCNC: 723 U/L — HIGH (ref 40–120)
ALP SERPL-CCNC: 738 U/L — HIGH (ref 40–120)
ALP SERPL-CCNC: 77 U/L — SIGNIFICANT CHANGE UP (ref 40–120)
ALP SERPL-CCNC: 78 U/L — SIGNIFICANT CHANGE UP (ref 40–120)
ALP SERPL-CCNC: 78 U/L — SIGNIFICANT CHANGE UP (ref 40–120)
ALP SERPL-CCNC: 79 U/L — SIGNIFICANT CHANGE UP (ref 40–120)
ALP SERPL-CCNC: 80 U/L — SIGNIFICANT CHANGE UP (ref 40–120)
ALP SERPL-CCNC: 81 U/L — SIGNIFICANT CHANGE UP (ref 40–120)
ALP SERPL-CCNC: 82 U/L — SIGNIFICANT CHANGE UP (ref 40–120)
ALP SERPL-CCNC: 83 U/L — SIGNIFICANT CHANGE UP (ref 40–120)
ALP SERPL-CCNC: 84 U/L — SIGNIFICANT CHANGE UP (ref 40–120)
ALP SERPL-CCNC: 87 U/L — SIGNIFICANT CHANGE UP (ref 40–120)
ALP SERPL-CCNC: 88 U/L — SIGNIFICANT CHANGE UP (ref 40–120)
ALP SERPL-CCNC: 91 U/L — SIGNIFICANT CHANGE UP (ref 40–120)
ALP SERPL-CCNC: 92 U/L — SIGNIFICANT CHANGE UP (ref 40–120)
ALP SERPL-CCNC: 93 U/L — SIGNIFICANT CHANGE UP (ref 40–120)
ALP SERPL-CCNC: 94 U/L — SIGNIFICANT CHANGE UP (ref 40–120)
ALPHA1 GLOB SERPL ELPH-MCNC: 0.3 G/DL — SIGNIFICANT CHANGE UP (ref 0.1–0.4)
ALPHA2 GLOB SERPL ELPH-MCNC: 0.7 G/DL — SIGNIFICANT CHANGE UP (ref 0.5–1)
ALT FLD-CCNC: 10 U/L — SIGNIFICANT CHANGE UP (ref 10–45)
ALT FLD-CCNC: 103 U/L — HIGH (ref 10–45)
ALT FLD-CCNC: 11 U/L — SIGNIFICANT CHANGE UP (ref 10–45)
ALT FLD-CCNC: 12 U/L — SIGNIFICANT CHANGE UP (ref 10–45)
ALT FLD-CCNC: 12 U/L — SIGNIFICANT CHANGE UP (ref 4–41)
ALT FLD-CCNC: 13 U/L — SIGNIFICANT CHANGE UP (ref 10–45)
ALT FLD-CCNC: 14 U/L — SIGNIFICANT CHANGE UP (ref 10–45)
ALT FLD-CCNC: 141 U/L — HIGH (ref 10–45)
ALT FLD-CCNC: 15 U/L — SIGNIFICANT CHANGE UP (ref 10–45)
ALT FLD-CCNC: 15 U/L — SIGNIFICANT CHANGE UP (ref 4–41)
ALT FLD-CCNC: 16 U/L — SIGNIFICANT CHANGE UP (ref 10–45)
ALT FLD-CCNC: 17 U/L — SIGNIFICANT CHANGE UP (ref 10–45)
ALT FLD-CCNC: 17 U/L — SIGNIFICANT CHANGE UP (ref 10–45)
ALT FLD-CCNC: 18 U/L — SIGNIFICANT CHANGE UP (ref 10–45)
ALT FLD-CCNC: 18 U/L — SIGNIFICANT CHANGE UP (ref 10–45)
ALT FLD-CCNC: 19 U/L — SIGNIFICANT CHANGE UP (ref 10–45)
ALT FLD-CCNC: 197 U/L — HIGH (ref 10–45)
ALT FLD-CCNC: 21 U/L — SIGNIFICANT CHANGE UP (ref 10–45)
ALT FLD-CCNC: 22 U/L — SIGNIFICANT CHANGE UP (ref 10–45)
ALT FLD-CCNC: 23 U/L — SIGNIFICANT CHANGE UP (ref 10–45)
ALT FLD-CCNC: 25 U/L — SIGNIFICANT CHANGE UP (ref 10–45)
ALT FLD-CCNC: 256 U/L — HIGH (ref 10–45)
ALT FLD-CCNC: 259 U/L — HIGH (ref 10–45)
ALT FLD-CCNC: 259 U/L — HIGH (ref 10–45)
ALT FLD-CCNC: 26 U/L — SIGNIFICANT CHANGE UP (ref 10–45)
ALT FLD-CCNC: 27 U/L — SIGNIFICANT CHANGE UP (ref 10–45)
ALT FLD-CCNC: 28 U/L — SIGNIFICANT CHANGE UP (ref 10–45)
ALT FLD-CCNC: 293 U/L — HIGH (ref 10–45)
ALT FLD-CCNC: 30 U/L — SIGNIFICANT CHANGE UP (ref 10–45)
ALT FLD-CCNC: 30 U/L — SIGNIFICANT CHANGE UP (ref 10–45)
ALT FLD-CCNC: 32 U/L — SIGNIFICANT CHANGE UP (ref 10–45)
ALT FLD-CCNC: 33 U/L — SIGNIFICANT CHANGE UP (ref 10–45)
ALT FLD-CCNC: 336 U/L — HIGH (ref 10–45)
ALT FLD-CCNC: 34 U/L — SIGNIFICANT CHANGE UP (ref 10–45)
ALT FLD-CCNC: 364 U/L — HIGH (ref 10–45)
ALT FLD-CCNC: 38 U/L — SIGNIFICANT CHANGE UP (ref 10–45)
ALT FLD-CCNC: 39 U/L — SIGNIFICANT CHANGE UP (ref 10–45)
ALT FLD-CCNC: 41 U/L — SIGNIFICANT CHANGE UP (ref 10–45)
ALT FLD-CCNC: 41 U/L — SIGNIFICANT CHANGE UP (ref 10–45)
ALT FLD-CCNC: 43 U/L — SIGNIFICANT CHANGE UP (ref 10–45)
ALT FLD-CCNC: 43 U/L — SIGNIFICANT CHANGE UP (ref 10–45)
ALT FLD-CCNC: 44 U/L — SIGNIFICANT CHANGE UP (ref 10–45)
ALT FLD-CCNC: 47 U/L — HIGH (ref 10–45)
ALT FLD-CCNC: 5 U/L — LOW (ref 10–45)
ALT FLD-CCNC: 520 U/L — HIGH (ref 10–45)
ALT FLD-CCNC: 53 U/L — HIGH (ref 10–45)
ALT FLD-CCNC: 562 U/L — HIGH (ref 10–45)
ALT FLD-CCNC: 580 U/L — HIGH (ref 10–45)
ALT FLD-CCNC: 596 U/L — HIGH (ref 10–45)
ALT FLD-CCNC: 6 U/L — LOW (ref 10–45)
ALT FLD-CCNC: 64 U/L — HIGH (ref 10–45)
ALT FLD-CCNC: 7 U/L — LOW (ref 10–45)
ALT FLD-CCNC: 8 U/L — LOW (ref 10–45)
ALT FLD-CCNC: 9 U/L — LOW (ref 10–45)
ALT FLD-CCNC: <5 U/L — LOW (ref 10–45)
ALT SERPL-CCNC: 11 U/L
ALT SERPL-CCNC: 12 U/L
ALT SERPL-CCNC: 5 U/L
AMMONIA BLD-MCNC: 17 UMOL/L — SIGNIFICANT CHANGE UP (ref 11–55)
AMMONIA BLD-MCNC: 18 UMOL/L — SIGNIFICANT CHANGE UP (ref 11–55)
AMMONIA BLD-MCNC: 36 UMOL/L — SIGNIFICANT CHANGE UP (ref 11–55)
AMMONIA BLD-MCNC: 59 UMOL/L — HIGH (ref 11–55)
ANION GAP SERPL CALC-SCNC: 10 MMOL/L — SIGNIFICANT CHANGE UP (ref 5–17)
ANION GAP SERPL CALC-SCNC: 11 MMOL/L — SIGNIFICANT CHANGE UP (ref 5–17)
ANION GAP SERPL CALC-SCNC: 12 MMOL/L
ANION GAP SERPL CALC-SCNC: 12 MMOL/L — SIGNIFICANT CHANGE UP (ref 5–17)
ANION GAP SERPL CALC-SCNC: 13 MMOL/L
ANION GAP SERPL CALC-SCNC: 13 MMOL/L — SIGNIFICANT CHANGE UP (ref 5–17)
ANION GAP SERPL CALC-SCNC: 14 MMOL/L
ANION GAP SERPL CALC-SCNC: 14 MMOL/L — SIGNIFICANT CHANGE UP (ref 5–17)
ANION GAP SERPL CALC-SCNC: 14 MMOL/L — SIGNIFICANT CHANGE UP (ref 7–14)
ANION GAP SERPL CALC-SCNC: 15 MMOL/L — SIGNIFICANT CHANGE UP (ref 5–17)
ANION GAP SERPL CALC-SCNC: 16 MMOL/L — HIGH (ref 7–14)
ANION GAP SERPL CALC-SCNC: 16 MMOL/L — SIGNIFICANT CHANGE UP (ref 5–17)
ANION GAP SERPL CALC-SCNC: 17 MMOL/L — SIGNIFICANT CHANGE UP (ref 5–17)
ANION GAP SERPL CALC-SCNC: 18 MMOL/L — HIGH (ref 5–17)
ANION GAP SERPL CALC-SCNC: 18 MMOL/L — HIGH (ref 7–14)
ANION GAP SERPL CALC-SCNC: 19 MMOL/L — HIGH (ref 5–17)
ANION GAP SERPL CALC-SCNC: 20 MMOL/L — HIGH (ref 5–17)
ANION GAP SERPL CALC-SCNC: 20 MMOL/L — HIGH (ref 5–17)
ANION GAP SERPL CALC-SCNC: 21 MMOL/L — HIGH (ref 5–17)
ANION GAP SERPL CALC-SCNC: 22 MMOL/L — HIGH (ref 5–17)
ANION GAP SERPL CALC-SCNC: 22 MMOL/L — HIGH (ref 5–17)
ANION GAP SERPL CALC-SCNC: 6 MMOL/L — SIGNIFICANT CHANGE UP (ref 5–17)
ANION GAP SERPL CALC-SCNC: 7 MMOL/L — SIGNIFICANT CHANGE UP (ref 5–17)
ANION GAP SERPL CALC-SCNC: 7 MMOL/L — SIGNIFICANT CHANGE UP (ref 5–17)
ANION GAP SERPL CALC-SCNC: 8 MMOL/L — SIGNIFICANT CHANGE UP (ref 5–17)
ANION GAP SERPL CALC-SCNC: 9 MMOL/L — SIGNIFICANT CHANGE UP (ref 5–17)
ANISOCYTOSIS BLD QL: SIGNIFICANT CHANGE UP
ANISOCYTOSIS BLD QL: SIGNIFICANT CHANGE UP
ANISOCYTOSIS BLD QL: SLIGHT — SIGNIFICANT CHANGE UP
APPEARANCE CSF: CLEAR — SIGNIFICANT CHANGE UP
APPEARANCE UR: ABNORMAL
APPEARANCE UR: CLEAR — SIGNIFICANT CHANGE UP
APPEARANCE: CLEAR
APTT BLD: 102.9 SEC — HIGH (ref 27.5–35.5)
APTT BLD: 23.9 SEC — LOW (ref 27.5–35.5)
APTT BLD: 23.9 SEC — LOW (ref 27.5–35.5)
APTT BLD: 26.8 SEC — LOW (ref 27.5–35.5)
APTT BLD: 28.6 SEC — SIGNIFICANT CHANGE UP (ref 27.5–35.5)
APTT BLD: 29.6 SEC — SIGNIFICANT CHANGE UP (ref 27–36.3)
APTT BLD: 29.7 SEC — SIGNIFICANT CHANGE UP (ref 27.5–35.5)
APTT BLD: 29.9 SEC — SIGNIFICANT CHANGE UP (ref 27.5–35.5)
APTT BLD: 30.1 SEC — SIGNIFICANT CHANGE UP (ref 27.5–35.5)
APTT BLD: 30.3 SEC — SIGNIFICANT CHANGE UP (ref 27.5–35.5)
APTT BLD: 30.3 SEC — SIGNIFICANT CHANGE UP (ref 27.5–35.5)
APTT BLD: 30.4 SEC — SIGNIFICANT CHANGE UP (ref 27.5–35.5)
APTT BLD: 30.7 SEC — SIGNIFICANT CHANGE UP (ref 27.5–35.5)
APTT BLD: 31.1 SEC — SIGNIFICANT CHANGE UP (ref 27.5–35.5)
APTT BLD: 31.1 SEC — SIGNIFICANT CHANGE UP (ref 27.5–35.5)
APTT BLD: 31.2 SEC — SIGNIFICANT CHANGE UP (ref 27.5–35.5)
APTT BLD: 31.3 SEC — SIGNIFICANT CHANGE UP (ref 27.5–35.5)
APTT BLD: 31.4 SEC — SIGNIFICANT CHANGE UP (ref 27.5–35.5)
APTT BLD: 31.5 SEC — SIGNIFICANT CHANGE UP (ref 27.5–35.5)
APTT BLD: 31.6 SEC — SIGNIFICANT CHANGE UP (ref 27.5–35.5)
APTT BLD: 31.6 SEC — SIGNIFICANT CHANGE UP (ref 27.5–35.5)
APTT BLD: 31.8 SEC — SIGNIFICANT CHANGE UP (ref 27.5–35.5)
APTT BLD: 31.9 SEC — SIGNIFICANT CHANGE UP (ref 27.5–35.5)
APTT BLD: 32.1 SEC — SIGNIFICANT CHANGE UP (ref 27.5–35.5)
APTT BLD: 32.2 SEC — SIGNIFICANT CHANGE UP (ref 27.5–35.5)
APTT BLD: 32.3 SEC — SIGNIFICANT CHANGE UP (ref 27.5–35.5)
APTT BLD: 32.4 SEC — SIGNIFICANT CHANGE UP (ref 27.5–35.5)
APTT BLD: 32.5 SEC — SIGNIFICANT CHANGE UP (ref 27.5–35.5)
APTT BLD: 32.7 SEC — SIGNIFICANT CHANGE UP (ref 27.5–35.5)
APTT BLD: 32.7 SEC — SIGNIFICANT CHANGE UP (ref 27.5–35.5)
APTT BLD: 32.8 SEC — SIGNIFICANT CHANGE UP (ref 27.5–35.5)
APTT BLD: 32.8 SEC — SIGNIFICANT CHANGE UP (ref 27.5–35.5)
APTT BLD: 32.9 SEC — SIGNIFICANT CHANGE UP (ref 27.5–35.5)
APTT BLD: 32.9 SEC — SIGNIFICANT CHANGE UP (ref 27.5–35.5)
APTT BLD: 33 SEC — SIGNIFICANT CHANGE UP (ref 27.5–35.5)
APTT BLD: 33.5 SEC — SIGNIFICANT CHANGE UP (ref 27.5–35.5)
APTT BLD: 33.6 SEC — SIGNIFICANT CHANGE UP (ref 27.5–35.5)
APTT BLD: 33.7 SEC — SIGNIFICANT CHANGE UP (ref 27.5–35.5)
APTT BLD: 33.8 SEC — SIGNIFICANT CHANGE UP (ref 27.5–35.5)
APTT BLD: 33.9 SEC — SIGNIFICANT CHANGE UP (ref 27.5–35.5)
APTT BLD: 34 SEC — SIGNIFICANT CHANGE UP (ref 27.5–35.5)
APTT BLD: 34.2 SEC — SIGNIFICANT CHANGE UP (ref 27.5–35.5)
APTT BLD: 34.5 SEC — SIGNIFICANT CHANGE UP (ref 27.5–35.5)
APTT BLD: 34.7 SEC — SIGNIFICANT CHANGE UP (ref 27.5–35.5)
APTT BLD: 35.1 SEC — SIGNIFICANT CHANGE UP (ref 27.5–35.5)
APTT BLD: 35.3 SEC — SIGNIFICANT CHANGE UP (ref 27.5–35.5)
APTT BLD: 36.2 SEC — HIGH (ref 27.5–35.5)
APTT BLD: 36.4 SEC — HIGH (ref 27.5–35.5)
APTT BLD: 36.6 SEC — HIGH (ref 27.5–35.5)
APTT BLD: 36.7 SEC — HIGH (ref 27.5–35.5)
APTT BLD: 36.9 SEC — HIGH (ref 27.5–35.5)
APTT BLD: 37.2 SEC — HIGH (ref 27.5–35.5)
APTT BLD: 37.2 SEC — HIGH (ref 27.5–35.5)
APTT BLD: 37.3 SEC — HIGH (ref 27.5–35.5)
APTT BLD: 37.3 SEC — HIGH (ref 27.5–35.5)
APTT BLD: 37.6 SEC — HIGH (ref 27.5–35.5)
APTT BLD: 37.8 SEC — HIGH (ref 27.5–35.5)
APTT BLD: 37.9 SEC — HIGH (ref 27.5–35.5)
APTT BLD: 38 SEC — HIGH (ref 27.5–35.5)
APTT BLD: 38.3 SEC — HIGH (ref 27.5–35.5)
APTT BLD: 38.6 SEC — HIGH (ref 27.5–35.5)
APTT BLD: 38.7 SEC — HIGH (ref 27.5–35.5)
APTT BLD: 39 SEC — HIGH (ref 27.5–35.5)
APTT BLD: 39.1 SEC — HIGH (ref 27.5–35.5)
APTT BLD: 39.4 SEC — HIGH (ref 27.5–35.5)
APTT BLD: 39.5 SEC — HIGH (ref 27.5–35.5)
APTT BLD: 39.7 SEC — HIGH (ref 27.5–35.5)
APTT BLD: 39.9 SEC — HIGH (ref 27.5–35.5)
APTT BLD: 40 SEC — HIGH (ref 27.5–35.5)
APTT BLD: 40.6 SEC — HIGH (ref 27.5–35.5)
APTT BLD: 40.6 SEC — HIGH (ref 27.5–35.5)
APTT BLD: 40.7 SEC — HIGH (ref 27.5–35.5)
APTT BLD: 40.8 SEC — HIGH (ref 27.5–35.5)
APTT BLD: 41.1 SEC — HIGH (ref 27.5–35.5)
APTT BLD: 41.2 SEC — HIGH (ref 27.5–35.5)
APTT BLD: 41.3 SEC — HIGH (ref 27.5–35.5)
APTT BLD: 41.5 SEC — HIGH (ref 27.5–35.5)
APTT BLD: 41.9 SEC — HIGH (ref 27.5–35.5)
APTT BLD: 43.3 SEC — HIGH (ref 27.5–35.5)
APTT BLD: 43.8 SEC — HIGH (ref 27.5–35.5)
APTT BLD: 44.2 SEC — HIGH (ref 27.5–35.5)
APTT BLD: 45.1 SEC — HIGH (ref 27.5–35.5)
APTT BLD: 47.9 SEC — HIGH (ref 27.5–35.5)
APTT BLD: 50.8 SEC — HIGH (ref 27.5–35.5)
APTT BLD: 52.4 SEC — HIGH (ref 27.5–35.5)
APTT BLD: 53.8 SEC — HIGH (ref 27.5–35.5)
APTT BLD: 55.7 SEC — HIGH (ref 27.5–35.5)
APTT BLD: 58.8 SEC — HIGH (ref 27.5–35.5)
APTT BLD: 60 SEC — HIGH (ref 27.5–35.5)
APTT BLD: 64.1 SEC — HIGH (ref 27–36.3)
APTT BLD: 73.2 SEC — HIGH (ref 27.5–35.5)
APTT BLD: 73.9 SEC — HIGH (ref 27.5–35.5)
APTT BLD: 76.3 SEC — HIGH (ref 27.5–35.5)
APTT BLD: 82.2 SEC — HIGH (ref 27.5–35.5)
APTT BLD: 84.2 SEC — HIGH (ref 27.5–35.5)
APTT BLD: 85.2 SEC — HIGH (ref 27.5–35.5)
APTT BLD: 88.5 SEC — HIGH (ref 27.5–35.5)
AST SERPL-CCNC: 11 U/L — SIGNIFICANT CHANGE UP (ref 10–40)
AST SERPL-CCNC: 11 U/L — SIGNIFICANT CHANGE UP (ref 10–40)
AST SERPL-CCNC: 110 U/L — HIGH (ref 10–40)
AST SERPL-CCNC: 12 U/L — SIGNIFICANT CHANGE UP (ref 10–40)
AST SERPL-CCNC: 122 U/L — HIGH (ref 10–40)
AST SERPL-CCNC: 13 U/L — SIGNIFICANT CHANGE UP (ref 10–40)
AST SERPL-CCNC: 136 U/L — HIGH (ref 10–40)
AST SERPL-CCNC: 14 U/L
AST SERPL-CCNC: 14 U/L — SIGNIFICANT CHANGE UP (ref 10–40)
AST SERPL-CCNC: 144 U/L — HIGH (ref 10–40)
AST SERPL-CCNC: 15 U/L — SIGNIFICANT CHANGE UP (ref 10–40)
AST SERPL-CCNC: 16 U/L
AST SERPL-CCNC: 16 U/L — SIGNIFICANT CHANGE UP (ref 10–40)
AST SERPL-CCNC: 167 U/L — HIGH (ref 10–40)
AST SERPL-CCNC: 17 U/L — SIGNIFICANT CHANGE UP (ref 10–40)
AST SERPL-CCNC: 18 U/L
AST SERPL-CCNC: 18 U/L — SIGNIFICANT CHANGE UP (ref 10–40)
AST SERPL-CCNC: 19 U/L — SIGNIFICANT CHANGE UP (ref 10–40)
AST SERPL-CCNC: 19 U/L — SIGNIFICANT CHANGE UP (ref 4–40)
AST SERPL-CCNC: 20 U/L — SIGNIFICANT CHANGE UP (ref 10–40)
AST SERPL-CCNC: 21 U/L — SIGNIFICANT CHANGE UP (ref 10–40)
AST SERPL-CCNC: 22 U/L — SIGNIFICANT CHANGE UP (ref 10–40)
AST SERPL-CCNC: 23 U/L — SIGNIFICANT CHANGE UP (ref 10–40)
AST SERPL-CCNC: 24 U/L — SIGNIFICANT CHANGE UP (ref 10–40)
AST SERPL-CCNC: 24 U/L — SIGNIFICANT CHANGE UP (ref 10–40)
AST SERPL-CCNC: 25 U/L — SIGNIFICANT CHANGE UP (ref 10–40)
AST SERPL-CCNC: 25 U/L — SIGNIFICANT CHANGE UP (ref 4–40)
AST SERPL-CCNC: 26 U/L — SIGNIFICANT CHANGE UP (ref 10–40)
AST SERPL-CCNC: 26 U/L — SIGNIFICANT CHANGE UP (ref 10–40)
AST SERPL-CCNC: 27 U/L — SIGNIFICANT CHANGE UP (ref 10–40)
AST SERPL-CCNC: 27 U/L — SIGNIFICANT CHANGE UP (ref 10–40)
AST SERPL-CCNC: 28 U/L — SIGNIFICANT CHANGE UP (ref 10–40)
AST SERPL-CCNC: 29 U/L — SIGNIFICANT CHANGE UP (ref 10–40)
AST SERPL-CCNC: 32 U/L — SIGNIFICANT CHANGE UP (ref 10–40)
AST SERPL-CCNC: 32 U/L — SIGNIFICANT CHANGE UP (ref 10–40)
AST SERPL-CCNC: 33 U/L — SIGNIFICANT CHANGE UP (ref 10–40)
AST SERPL-CCNC: 34 U/L — SIGNIFICANT CHANGE UP (ref 10–40)
AST SERPL-CCNC: 358 U/L — HIGH (ref 10–40)
AST SERPL-CCNC: 38 U/L — SIGNIFICANT CHANGE UP (ref 10–40)
AST SERPL-CCNC: 39 U/L — SIGNIFICANT CHANGE UP (ref 10–40)
AST SERPL-CCNC: 40 U/L — SIGNIFICANT CHANGE UP (ref 10–40)
AST SERPL-CCNC: 40 U/L — SIGNIFICANT CHANGE UP (ref 10–40)
AST SERPL-CCNC: 407 U/L — HIGH (ref 10–40)
AST SERPL-CCNC: 41 U/L — HIGH (ref 10–40)
AST SERPL-CCNC: 42 U/L — HIGH (ref 10–40)
AST SERPL-CCNC: 43 U/L — HIGH (ref 10–40)
AST SERPL-CCNC: 44 U/L — HIGH (ref 10–40)
AST SERPL-CCNC: 44 U/L — HIGH (ref 10–40)
AST SERPL-CCNC: 45 U/L — HIGH (ref 10–40)
AST SERPL-CCNC: 45 U/L — HIGH (ref 10–40)
AST SERPL-CCNC: 457 U/L — HIGH (ref 10–40)
AST SERPL-CCNC: 47 U/L — HIGH (ref 10–40)
AST SERPL-CCNC: 51 U/L — HIGH (ref 10–40)
AST SERPL-CCNC: 555 U/L — HIGH (ref 10–40)
AST SERPL-CCNC: 60 U/L — HIGH (ref 10–40)
AST SERPL-CCNC: 67 U/L — HIGH (ref 10–40)
AST SERPL-CCNC: 685 U/L — HIGH (ref 10–40)
AST SERPL-CCNC: 92 U/L — HIGH (ref 10–40)
B BURGDOR DNA SPEC QL NAA+PROBE: NEGATIVE — SIGNIFICANT CHANGE UP
B PERT DNA SPEC QL NAA+PROBE: SIGNIFICANT CHANGE UP
B PERT IGG+IGM PNL SER: ABNORMAL
B PERT+PARAPERT DNA PNL SPEC NAA+PROBE: SIGNIFICANT CHANGE UP
B-GLOBULIN SERPL ELPH-MCNC: 0.9 G/DL — SIGNIFICANT CHANGE UP (ref 0.5–1)
B19V DNA FLD QL NAA+PROBE: SIGNIFICANT CHANGE UP IU/ML
B19V IGG SER-ACNC: 5.92 INDEX — HIGH (ref 0–0.9)
B19V IGG+IGM SER-IMP: POSITIVE
B19V IGG+IGM SER-IMP: SIGNIFICANT CHANGE UP
B19V IGM FLD-ACNC: 0.17 INDEX — SIGNIFICANT CHANGE UP (ref 0–0.9)
B19V IGM SER-ACNC: NEGATIVE — SIGNIFICANT CHANGE UP
BACTERIA # UR AUTO: ABNORMAL
BACTERIA # UR AUTO: ABNORMAL
BACTERIA # UR AUTO: NEGATIVE — SIGNIFICANT CHANGE UP
BACTERIA: ABNORMAL
BASE EXCESS BLDV CALC-SCNC: -0.1 MMOL/L — SIGNIFICANT CHANGE UP (ref -2–2)
BASE EXCESS BLDV CALC-SCNC: -0.9 MMOL/L — SIGNIFICANT CHANGE UP (ref -2–3)
BASE EXCESS BLDV CALC-SCNC: -3 MMOL/L — LOW (ref -2–2)
BASE EXCESS BLDV CALC-SCNC: -8.6 MMOL/L — LOW (ref -2–2)
BASE EXCESS BLDV CALC-SCNC: 0 MMOL/L — SIGNIFICANT CHANGE UP (ref -2–2)
BASE EXCESS BLDV CALC-SCNC: 0.1 MMOL/L — SIGNIFICANT CHANGE UP (ref -2–2)
BASE EXCESS BLDV CALC-SCNC: 0.4 MMOL/L — SIGNIFICANT CHANGE UP (ref -2–3)
BASE EXCESS BLDV CALC-SCNC: 2 MMOL/L — SIGNIFICANT CHANGE UP (ref -2–2)
BASE EXCESS BLDV CALC-SCNC: 2.4 MMOL/L — HIGH (ref -2–2)
BASE EXCESS BLDV CALC-SCNC: 4 MMOL/L — HIGH (ref -2–2)
BASE EXCESS BLDV CALC-SCNC: 4.1 MMOL/L — HIGH (ref -2–3)
BASOPHILS # BLD AUTO: 0 K/UL — SIGNIFICANT CHANGE UP (ref 0–0.2)
BASOPHILS # BLD AUTO: 0.01 K/UL — SIGNIFICANT CHANGE UP (ref 0–0.2)
BASOPHILS # BLD AUTO: 0.02 K/UL — SIGNIFICANT CHANGE UP (ref 0–0.2)
BASOPHILS # BLD AUTO: 0.03 K/UL — SIGNIFICANT CHANGE UP (ref 0–0.2)
BASOPHILS # BLD AUTO: 0.04 K/UL
BASOPHILS # BLD AUTO: 0.04 K/UL
BASOPHILS # BLD AUTO: 0.04 K/UL — SIGNIFICANT CHANGE UP (ref 0–0.2)
BASOPHILS # BLD AUTO: 0.05 K/UL
BASOPHILS # BLD AUTO: 0.05 K/UL — SIGNIFICANT CHANGE UP (ref 0–0.2)
BASOPHILS # BLD AUTO: 0.06 K/UL — SIGNIFICANT CHANGE UP (ref 0–0.2)
BASOPHILS # BLD AUTO: 0.07 K/UL — SIGNIFICANT CHANGE UP (ref 0–0.2)
BASOPHILS # BLD AUTO: 0.08 K/UL — SIGNIFICANT CHANGE UP (ref 0–0.2)
BASOPHILS # BLD AUTO: 0.09 K/UL — SIGNIFICANT CHANGE UP (ref 0–0.2)
BASOPHILS # BLD AUTO: 0.09 K/UL — SIGNIFICANT CHANGE UP (ref 0–0.2)
BASOPHILS # BLD AUTO: 0.1 K/UL — SIGNIFICANT CHANGE UP (ref 0–0.2)
BASOPHILS # BLD AUTO: 0.11 K/UL — SIGNIFICANT CHANGE UP (ref 0–0.2)
BASOPHILS # BLD AUTO: 0.13 K/UL — SIGNIFICANT CHANGE UP (ref 0–0.2)
BASOPHILS # BLD AUTO: 0.16 K/UL — SIGNIFICANT CHANGE UP (ref 0–0.2)
BASOPHILS # BLD AUTO: 0.18 K/UL — SIGNIFICANT CHANGE UP (ref 0–0.2)
BASOPHILS NFR BLD AUTO: 0 % — SIGNIFICANT CHANGE UP (ref 0–2)
BASOPHILS NFR BLD AUTO: 0.1 % — SIGNIFICANT CHANGE UP (ref 0–2)
BASOPHILS NFR BLD AUTO: 0.2 % — SIGNIFICANT CHANGE UP (ref 0–2)
BASOPHILS NFR BLD AUTO: 0.3 %
BASOPHILS NFR BLD AUTO: 0.3 % — SIGNIFICANT CHANGE UP (ref 0–2)
BASOPHILS NFR BLD AUTO: 0.4 % — SIGNIFICANT CHANGE UP (ref 0–2)
BASOPHILS NFR BLD AUTO: 0.4 % — SIGNIFICANT CHANGE UP (ref 0–2)
BASOPHILS NFR BLD AUTO: 0.5 % — SIGNIFICANT CHANGE UP (ref 0–2)
BASOPHILS NFR BLD AUTO: 0.6 %
BASOPHILS NFR BLD AUTO: 0.6 % — SIGNIFICANT CHANGE UP (ref 0–2)
BASOPHILS NFR BLD AUTO: 0.6 % — SIGNIFICANT CHANGE UP (ref 0–2)
BASOPHILS NFR BLD AUTO: 0.7 %
BASOPHILS NFR BLD AUTO: 0.7 % — SIGNIFICANT CHANGE UP (ref 0–2)
BASOPHILS NFR BLD AUTO: 0.8 % — SIGNIFICANT CHANGE UP (ref 0–2)
BASOPHILS NFR BLD AUTO: 0.9 % — SIGNIFICANT CHANGE UP (ref 0–2)
BASOPHILS NFR BLD AUTO: 1 % — SIGNIFICANT CHANGE UP (ref 0–2)
BASOPHILS NFR BLD AUTO: 1.1 % — SIGNIFICANT CHANGE UP (ref 0–2)
BASOPHILS NFR BLD AUTO: 1.1 % — SIGNIFICANT CHANGE UP (ref 0–2)
BASOPHILS NFR BLD AUTO: 1.2 % — SIGNIFICANT CHANGE UP (ref 0–2)
BASOPHILS NFR BLD AUTO: 1.3 % — SIGNIFICANT CHANGE UP (ref 0–2)
BASOPHILS NFR BLD AUTO: 1.5 % — SIGNIFICANT CHANGE UP (ref 0–2)
BASOPHILS NFR BLD AUTO: 1.7 % — SIGNIFICANT CHANGE UP (ref 0–2)
BASOPHILS NFR BLD AUTO: 1.8 % — SIGNIFICANT CHANGE UP (ref 0–2)
BASOPHILS NFR BLD AUTO: 1.9 % — SIGNIFICANT CHANGE UP (ref 0–2)
BASOPHILS NFR BLD AUTO: 2.1 % — HIGH (ref 0–2)
BASOPHILS NFR BLD AUTO: 2.6 % — HIGH (ref 0–2)
BASOPHILS NFR BLD AUTO: 2.6 % — HIGH (ref 0–2)
BASOPHILS NFR BLD AUTO: 3 % — HIGH (ref 0–2)
BASOPHILS NFR BLD AUTO: 3.2 % — HIGH (ref 0–2)
BASOPHILS NFR BLD AUTO: 3.5 % — HIGH (ref 0–2)
BASOPHILS NFR BLD AUTO: 4.3 % — HIGH (ref 0–2)
BASOPHILS NFR BLD AUTO: 4.3 % — HIGH (ref 0–2)
BILIRUB DIRECT SERPL-MCNC: 0.1 MG/DL — SIGNIFICANT CHANGE UP (ref 0–0.3)
BILIRUB DIRECT SERPL-MCNC: 0.2 MG/DL — SIGNIFICANT CHANGE UP (ref 0–0.3)
BILIRUB DIRECT SERPL-MCNC: <0.1 MG/DL — SIGNIFICANT CHANGE UP (ref 0–0.3)
BILIRUB INDIRECT FLD-MCNC: 0 MG/DL — LOW (ref 0.2–1)
BILIRUB INDIRECT FLD-MCNC: 0.1 MG/DL — LOW (ref 0.2–1)
BILIRUB INDIRECT FLD-MCNC: 0.2 MG/DL — SIGNIFICANT CHANGE UP (ref 0.2–1)
BILIRUB INDIRECT FLD-MCNC: >0.1 MG/DL — LOW (ref 0.2–1)
BILIRUB SERPL-MCNC: 0.1 MG/DL — LOW (ref 0.2–1.2)
BILIRUB SERPL-MCNC: 0.1 MG/DL — LOW (ref 0.2–1.2)
BILIRUB SERPL-MCNC: 0.2 MG/DL
BILIRUB SERPL-MCNC: 0.2 MG/DL — SIGNIFICANT CHANGE UP (ref 0.2–1.2)
BILIRUB SERPL-MCNC: 0.3 MG/DL
BILIRUB SERPL-MCNC: 0.3 MG/DL
BILIRUB SERPL-MCNC: 0.3 MG/DL — SIGNIFICANT CHANGE UP (ref 0.2–1.2)
BILIRUB SERPL-MCNC: 0.4 MG/DL — SIGNIFICANT CHANGE UP (ref 0.2–1.2)
BILIRUB SERPL-MCNC: 0.5 MG/DL — SIGNIFICANT CHANGE UP (ref 0.2–1.2)
BILIRUB SERPL-MCNC: 0.5 MG/DL — SIGNIFICANT CHANGE UP (ref 0.2–1.2)
BILIRUB SERPL-MCNC: 0.7 MG/DL — SIGNIFICANT CHANGE UP (ref 0.2–1.2)
BILIRUB SERPL-MCNC: 0.7 MG/DL — SIGNIFICANT CHANGE UP (ref 0.2–1.2)
BILIRUB UR-MCNC: NEGATIVE — SIGNIFICANT CHANGE UP
BILIRUBIN URINE: NEGATIVE
BKV DNA SPEC QL NAA+PROBE: NOT DETECTED COPIES/ML
BKV DNA SPEC QL NAA+PROBE: NOT DETECTED COPIES/ML
BKV DNA SPEC QL NAA+PROBE: SIGNIFICANT CHANGE UP
BLD GP AB SCN SERPL QL: NEGATIVE — SIGNIFICANT CHANGE UP
BLOOD GAS VENOUS COMPREHENSIVE RESULT: SIGNIFICANT CHANGE UP
BLOOD GAS VENOUS COMPREHENSIVE RESULT: SIGNIFICANT CHANGE UP
BLOOD URINE: ABNORMAL
BORDETELLA PARAPERTUSSIS (RAPRVP): SIGNIFICANT CHANGE UP
BUN SERPL-MCNC: 102 MG/DL — HIGH (ref 7–23)
BUN SERPL-MCNC: 104 MG/DL — HIGH (ref 7–23)
BUN SERPL-MCNC: 108 MG/DL — HIGH (ref 7–23)
BUN SERPL-MCNC: 112 MG/DL — HIGH (ref 7–23)
BUN SERPL-MCNC: 113 MG/DL — HIGH (ref 7–23)
BUN SERPL-MCNC: 18 MG/DL — SIGNIFICANT CHANGE UP (ref 7–23)
BUN SERPL-MCNC: 20 MG/DL — SIGNIFICANT CHANGE UP (ref 7–23)
BUN SERPL-MCNC: 21 MG/DL — SIGNIFICANT CHANGE UP (ref 7–23)
BUN SERPL-MCNC: 21 MG/DL — SIGNIFICANT CHANGE UP (ref 7–23)
BUN SERPL-MCNC: 22 MG/DL — SIGNIFICANT CHANGE UP (ref 7–23)
BUN SERPL-MCNC: 23 MG/DL — SIGNIFICANT CHANGE UP (ref 7–23)
BUN SERPL-MCNC: 24 MG/DL — HIGH (ref 7–23)
BUN SERPL-MCNC: 25 MG/DL — HIGH (ref 7–23)
BUN SERPL-MCNC: 26 MG/DL — HIGH (ref 7–23)
BUN SERPL-MCNC: 27 MG/DL — HIGH (ref 7–23)
BUN SERPL-MCNC: 28 MG/DL — HIGH (ref 7–23)
BUN SERPL-MCNC: 28 MG/DL — HIGH (ref 7–23)
BUN SERPL-MCNC: 29 MG/DL — HIGH (ref 7–23)
BUN SERPL-MCNC: 30 MG/DL — HIGH (ref 7–23)
BUN SERPL-MCNC: 30 MG/DL — HIGH (ref 7–23)
BUN SERPL-MCNC: 31 MG/DL — HIGH (ref 7–23)
BUN SERPL-MCNC: 32 MG/DL — HIGH (ref 7–23)
BUN SERPL-MCNC: 33 MG/DL — HIGH (ref 7–23)
BUN SERPL-MCNC: 34 MG/DL — HIGH (ref 7–23)
BUN SERPL-MCNC: 35 MG/DL — HIGH (ref 7–23)
BUN SERPL-MCNC: 36 MG/DL — HIGH (ref 7–23)
BUN SERPL-MCNC: 37 MG/DL — HIGH (ref 7–23)
BUN SERPL-MCNC: 37 MG/DL — HIGH (ref 7–23)
BUN SERPL-MCNC: 38 MG/DL — HIGH (ref 7–23)
BUN SERPL-MCNC: 40 MG/DL — HIGH (ref 7–23)
BUN SERPL-MCNC: 41 MG/DL — HIGH (ref 7–23)
BUN SERPL-MCNC: 42 MG/DL — HIGH (ref 7–23)
BUN SERPL-MCNC: 43 MG/DL — HIGH (ref 7–23)
BUN SERPL-MCNC: 44 MG/DL — HIGH (ref 7–23)
BUN SERPL-MCNC: 45 MG/DL — HIGH (ref 7–23)
BUN SERPL-MCNC: 47 MG/DL — HIGH (ref 7–23)
BUN SERPL-MCNC: 48 MG/DL — HIGH (ref 7–23)
BUN SERPL-MCNC: 49 MG/DL — HIGH (ref 7–23)
BUN SERPL-MCNC: 50 MG/DL — HIGH (ref 7–23)
BUN SERPL-MCNC: 51 MG/DL — HIGH (ref 7–23)
BUN SERPL-MCNC: 52 MG/DL — HIGH (ref 7–23)
BUN SERPL-MCNC: 53 MG/DL — HIGH (ref 7–23)
BUN SERPL-MCNC: 54 MG/DL — HIGH (ref 7–23)
BUN SERPL-MCNC: 55 MG/DL — HIGH (ref 7–23)
BUN SERPL-MCNC: 56 MG/DL — HIGH (ref 7–23)
BUN SERPL-MCNC: 57 MG/DL — HIGH (ref 7–23)
BUN SERPL-MCNC: 57 MG/DL — HIGH (ref 7–23)
BUN SERPL-MCNC: 58 MG/DL
BUN SERPL-MCNC: 58 MG/DL — HIGH (ref 7–23)
BUN SERPL-MCNC: 59 MG/DL — HIGH (ref 7–23)
BUN SERPL-MCNC: 60 MG/DL — HIGH (ref 7–23)
BUN SERPL-MCNC: 62 MG/DL — HIGH (ref 7–23)
BUN SERPL-MCNC: 63 MG/DL — HIGH (ref 7–23)
BUN SERPL-MCNC: 64 MG/DL — HIGH (ref 7–23)
BUN SERPL-MCNC: 64 MG/DL — HIGH (ref 7–23)
BUN SERPL-MCNC: 65 MG/DL — HIGH (ref 7–23)
BUN SERPL-MCNC: 65 MG/DL — HIGH (ref 7–23)
BUN SERPL-MCNC: 66 MG/DL — HIGH (ref 7–23)
BUN SERPL-MCNC: 67 MG/DL — HIGH (ref 7–23)
BUN SERPL-MCNC: 67 MG/DL — HIGH (ref 7–23)
BUN SERPL-MCNC: 68 MG/DL — HIGH (ref 7–23)
BUN SERPL-MCNC: 68 MG/DL — HIGH (ref 7–23)
BUN SERPL-MCNC: 69 MG/DL — HIGH (ref 7–23)
BUN SERPL-MCNC: 69 MG/DL — HIGH (ref 7–23)
BUN SERPL-MCNC: 70 MG/DL — HIGH (ref 7–23)
BUN SERPL-MCNC: 71 MG/DL — HIGH (ref 7–23)
BUN SERPL-MCNC: 72 MG/DL
BUN SERPL-MCNC: 72 MG/DL — HIGH (ref 7–23)
BUN SERPL-MCNC: 73 MG/DL — HIGH (ref 7–23)
BUN SERPL-MCNC: 74 MG/DL — HIGH (ref 7–23)
BUN SERPL-MCNC: 75 MG/DL — HIGH (ref 7–23)
BUN SERPL-MCNC: 76 MG/DL — HIGH (ref 7–23)
BUN SERPL-MCNC: 77 MG/DL — HIGH (ref 7–23)
BUN SERPL-MCNC: 78 MG/DL — HIGH (ref 7–23)
BUN SERPL-MCNC: 79 MG/DL
BUN SERPL-MCNC: 79 MG/DL — HIGH (ref 7–23)
BUN SERPL-MCNC: 79 MG/DL — HIGH (ref 7–23)
BUN SERPL-MCNC: 80 MG/DL — HIGH (ref 7–23)
BUN SERPL-MCNC: 81 MG/DL — HIGH (ref 7–23)
BUN SERPL-MCNC: 83 MG/DL — HIGH (ref 7–23)
BUN SERPL-MCNC: 84 MG/DL — HIGH (ref 7–23)
BUN SERPL-MCNC: 85 MG/DL — HIGH (ref 7–23)
BUN SERPL-MCNC: 86 MG/DL — HIGH (ref 7–23)
BUN SERPL-MCNC: 86 MG/DL — HIGH (ref 7–23)
BUN SERPL-MCNC: 87 MG/DL — HIGH (ref 7–23)
BUN SERPL-MCNC: 88 MG/DL — HIGH (ref 7–23)
BUN SERPL-MCNC: 89 MG/DL — HIGH (ref 7–23)
BUN SERPL-MCNC: 89 MG/DL — HIGH (ref 7–23)
BUN SERPL-MCNC: 90 MG/DL — HIGH (ref 7–23)
BUN SERPL-MCNC: 90 MG/DL — HIGH (ref 7–23)
BUN SERPL-MCNC: 92 MG/DL — HIGH (ref 7–23)
BUN SERPL-MCNC: 93 MG/DL — HIGH (ref 7–23)
BUN SERPL-MCNC: 93 MG/DL — HIGH (ref 7–23)
BUN SERPL-MCNC: 94 MG/DL — HIGH (ref 7–23)
BUN SERPL-MCNC: 95 MG/DL — HIGH (ref 7–23)
BUN SERPL-MCNC: 96 MG/DL — HIGH (ref 7–23)
BUN SERPL-MCNC: 97 MG/DL — HIGH (ref 7–23)
BUN SERPL-MCNC: 98 MG/DL — HIGH (ref 7–23)
BUN SERPL-MCNC: 98 MG/DL — HIGH (ref 7–23)
BURR CELLS BLD QL SMEAR: PRESENT — SIGNIFICANT CHANGE UP
BURR CELLS BLD QL SMEAR: SLIGHT — SIGNIFICANT CHANGE UP
BURR CELLS BLD QL SMEAR: SLIGHT — SIGNIFICANT CHANGE UP
C PNEUM DNA SPEC QL NAA+PROBE: SIGNIFICANT CHANGE UP
CA-I SERPL-SCNC: 1.14 MMOL/L — LOW (ref 1.15–1.33)
CA-I SERPL-SCNC: 1.16 MMOL/L — SIGNIFICANT CHANGE UP (ref 1.15–1.33)
CA-I SERPL-SCNC: 1.16 MMOL/L — SIGNIFICANT CHANGE UP (ref 1.15–1.33)
CA-I SERPL-SCNC: 1.2 MMOL/L — SIGNIFICANT CHANGE UP (ref 1.15–1.33)
CA-I SERPL-SCNC: 1.21 MMOL/L — SIGNIFICANT CHANGE UP (ref 1.15–1.33)
CA-I SERPL-SCNC: 1.21 MMOL/L — SIGNIFICANT CHANGE UP (ref 1.15–1.33)
CA-I SERPL-SCNC: 1.27 MMOL/L — SIGNIFICANT CHANGE UP (ref 1.15–1.33)
CALCIUM SERPL-MCNC: 7.1 MG/DL — LOW (ref 8.4–10.5)
CALCIUM SERPL-MCNC: 7.2 MG/DL — LOW (ref 8.4–10.5)
CALCIUM SERPL-MCNC: 7.4 MG/DL — LOW (ref 8.4–10.5)
CALCIUM SERPL-MCNC: 7.5 MG/DL — LOW (ref 8.4–10.5)
CALCIUM SERPL-MCNC: 7.6 MG/DL — LOW (ref 8.4–10.5)
CALCIUM SERPL-MCNC: 7.7 MG/DL — LOW (ref 8.4–10.5)
CALCIUM SERPL-MCNC: 7.7 MG/DL — LOW (ref 8.4–10.5)
CALCIUM SERPL-MCNC: 7.8 MG/DL — LOW (ref 8.4–10.5)
CALCIUM SERPL-MCNC: 7.8 MG/DL — LOW (ref 8.4–10.5)
CALCIUM SERPL-MCNC: 7.9 MG/DL — LOW (ref 8.4–10.5)
CALCIUM SERPL-MCNC: 8 MG/DL — LOW (ref 8.4–10.5)
CALCIUM SERPL-MCNC: 8.1 MG/DL — LOW (ref 8.4–10.5)
CALCIUM SERPL-MCNC: 8.2 MG/DL — LOW (ref 8.4–10.5)
CALCIUM SERPL-MCNC: 8.3 MG/DL — LOW (ref 8.4–10.5)
CALCIUM SERPL-MCNC: 8.4 MG/DL — SIGNIFICANT CHANGE UP (ref 8.4–10.5)
CALCIUM SERPL-MCNC: 8.5 MG/DL — SIGNIFICANT CHANGE UP (ref 8.4–10.5)
CALCIUM SERPL-MCNC: 8.6 MG/DL — SIGNIFICANT CHANGE UP (ref 8.4–10.5)
CALCIUM SERPL-MCNC: 8.7 MG/DL
CALCIUM SERPL-MCNC: 8.7 MG/DL
CALCIUM SERPL-MCNC: 8.7 MG/DL — SIGNIFICANT CHANGE UP (ref 8.4–10.5)
CALCIUM SERPL-MCNC: 8.8 MG/DL — SIGNIFICANT CHANGE UP (ref 8.4–10.5)
CALCIUM SERPL-MCNC: 8.9 MG/DL — SIGNIFICANT CHANGE UP (ref 8.4–10.5)
CALCIUM SERPL-MCNC: 9 MG/DL
CALCIUM SERPL-MCNC: 9 MG/DL — SIGNIFICANT CHANGE UP (ref 8.4–10.5)
CALCIUM SERPL-MCNC: 9.1 MG/DL — SIGNIFICANT CHANGE UP (ref 8.4–10.5)
CALCIUM SERPL-MCNC: 9.2 MG/DL — SIGNIFICANT CHANGE UP (ref 8.4–10.5)
CALCIUM SERPL-MCNC: 9.3 MG/DL
CALCIUM SERPL-MCNC: 9.3 MG/DL
CALCIUM SERPL-MCNC: 9.3 MG/DL — SIGNIFICANT CHANGE UP (ref 8.4–10.5)
CALCIUM SERPL-MCNC: 9.4 MG/DL — SIGNIFICANT CHANGE UP (ref 8.4–10.5)
CALCIUM SERPL-MCNC: 9.4 MG/DL — SIGNIFICANT CHANGE UP (ref 8.4–10.5)
CHLORIDE BLDV-SCNC: 100 MMOL/L — SIGNIFICANT CHANGE UP (ref 96–108)
CHLORIDE BLDV-SCNC: 101 MMOL/L — SIGNIFICANT CHANGE UP (ref 96–108)
CHLORIDE BLDV-SCNC: 101 MMOL/L — SIGNIFICANT CHANGE UP (ref 96–108)
CHLORIDE BLDV-SCNC: 102 MMOL/L — SIGNIFICANT CHANGE UP (ref 96–108)
CHLORIDE BLDV-SCNC: 104 MMOL/L — SIGNIFICANT CHANGE UP (ref 96–108)
CHLORIDE BLDV-SCNC: 105 MMOL/L — SIGNIFICANT CHANGE UP (ref 96–108)
CHLORIDE BLDV-SCNC: 107 MMOL/L — SIGNIFICANT CHANGE UP (ref 96–108)
CHLORIDE BLDV-SCNC: 92 MMOL/L — LOW (ref 96–108)
CHLORIDE BLDV-SCNC: 94 MMOL/L — LOW (ref 96–108)
CHLORIDE SERPL-SCNC: 100 MMOL/L — SIGNIFICANT CHANGE UP (ref 96–108)
CHLORIDE SERPL-SCNC: 101 MMOL/L — SIGNIFICANT CHANGE UP (ref 96–108)
CHLORIDE SERPL-SCNC: 102 MMOL/L
CHLORIDE SERPL-SCNC: 102 MMOL/L — SIGNIFICANT CHANGE UP (ref 96–108)
CHLORIDE SERPL-SCNC: 103 MMOL/L
CHLORIDE SERPL-SCNC: 103 MMOL/L — SIGNIFICANT CHANGE UP (ref 96–108)
CHLORIDE SERPL-SCNC: 104 MMOL/L — SIGNIFICANT CHANGE UP (ref 96–108)
CHLORIDE SERPL-SCNC: 105 MMOL/L — SIGNIFICANT CHANGE UP (ref 96–108)
CHLORIDE SERPL-SCNC: 106 MMOL/L
CHLORIDE SERPL-SCNC: 106 MMOL/L — SIGNIFICANT CHANGE UP (ref 96–108)
CHLORIDE SERPL-SCNC: 107 MMOL/L — SIGNIFICANT CHANGE UP (ref 96–108)
CHLORIDE SERPL-SCNC: 108 MMOL/L — SIGNIFICANT CHANGE UP (ref 96–108)
CHLORIDE SERPL-SCNC: 109 MMOL/L — HIGH (ref 96–108)
CHLORIDE SERPL-SCNC: 110 MMOL/L — HIGH (ref 96–108)
CHLORIDE SERPL-SCNC: 111 MMOL/L — HIGH (ref 96–108)
CHLORIDE SERPL-SCNC: 112 MMOL/L — HIGH (ref 96–108)
CHLORIDE SERPL-SCNC: 113 MMOL/L — HIGH (ref 96–108)
CHLORIDE SERPL-SCNC: 114 MMOL/L — HIGH (ref 96–108)
CHLORIDE SERPL-SCNC: 117 MMOL/L — HIGH (ref 96–108)
CHLORIDE SERPL-SCNC: 117 MMOL/L — HIGH (ref 96–108)
CHLORIDE SERPL-SCNC: 88 MMOL/L — LOW (ref 96–108)
CHLORIDE SERPL-SCNC: 89 MMOL/L — LOW (ref 96–108)
CHLORIDE SERPL-SCNC: 90 MMOL/L — LOW (ref 96–108)
CHLORIDE SERPL-SCNC: 91 MMOL/L — LOW (ref 96–108)
CHLORIDE SERPL-SCNC: 92 MMOL/L — LOW (ref 96–108)
CHLORIDE SERPL-SCNC: 93 MMOL/L — LOW (ref 96–108)
CHLORIDE SERPL-SCNC: 94 MMOL/L — LOW (ref 96–108)
CHLORIDE SERPL-SCNC: 95 MMOL/L — LOW (ref 96–108)
CHLORIDE SERPL-SCNC: 96 MMOL/L — LOW (ref 98–107)
CHLORIDE SERPL-SCNC: 96 MMOL/L — SIGNIFICANT CHANGE UP (ref 96–108)
CHLORIDE SERPL-SCNC: 97 MMOL/L — LOW (ref 98–107)
CHLORIDE SERPL-SCNC: 97 MMOL/L — SIGNIFICANT CHANGE UP (ref 96–108)
CHLORIDE SERPL-SCNC: 98 MMOL/L — SIGNIFICANT CHANGE UP (ref 96–108)
CHLORIDE SERPL-SCNC: 98 MMOL/L — SIGNIFICANT CHANGE UP (ref 98–107)
CHLORIDE SERPL-SCNC: 99 MMOL/L — SIGNIFICANT CHANGE UP (ref 96–108)
CHLORIDE UR-SCNC: <20 MMOL/L — SIGNIFICANT CHANGE UP
CK MB BLD-MCNC: 5.8 % — HIGH (ref 0–3.5)
CK MB CFR SERPL CALC: 1.5 NG/ML — SIGNIFICANT CHANGE UP (ref 0–6.7)
CK MB CFR SERPL CALC: 1.8 NG/ML — SIGNIFICANT CHANGE UP
CK MB CFR SERPL CALC: 1.8 NG/ML — SIGNIFICANT CHANGE UP (ref 0–6.7)
CK MB CFR SERPL CALC: 2.1 NG/ML — SIGNIFICANT CHANGE UP (ref 0–6.7)
CK MB CFR SERPL CALC: 2.2 NG/ML — SIGNIFICANT CHANGE UP (ref 0–6.7)
CK MB CFR SERPL CALC: 2.5 NG/ML — SIGNIFICANT CHANGE UP (ref 0–6.7)
CK SERPL-CCNC: 111 U/L — SIGNIFICANT CHANGE UP (ref 30–200)
CK SERPL-CCNC: 17 U/L — LOW (ref 30–200)
CK SERPL-CCNC: 26 U/L — LOW (ref 30–200)
CK SERPL-CCNC: 26 U/L — LOW (ref 30–200)
CK SERPL-CCNC: 28 U/L — LOW (ref 30–200)
CK SERPL-CCNC: 302 U/L — HIGH (ref 30–200)
CK SERPL-CCNC: <10 U/L — LOW (ref 30–200)
CMV DNA CSF QL NAA+PROBE: SIGNIFICANT CHANGE UP
CMV DNA SPEC NAA+PROBE-LOG#: SIGNIFICANT CHANGE UP LOG10IU/ML
CO2 BLDV-SCNC: 16 MMOL/L — LOW (ref 22–26)
CO2 BLDV-SCNC: 26 MMOL/L — SIGNIFICANT CHANGE UP (ref 22–26)
CO2 BLDV-SCNC: 26 MMOL/L — SIGNIFICANT CHANGE UP (ref 22–26)
CO2 BLDV-SCNC: 27 MMOL/L — HIGH (ref 22–26)
CO2 BLDV-SCNC: 27.4 MMOL/L — HIGH (ref 22–26)
CO2 BLDV-SCNC: 29 MMOL/L — HIGH (ref 22–26)
CO2 BLDV-SCNC: 31 MMOL/L — HIGH (ref 22–26)
CO2 BLDV-SCNC: 31.2 MMOL/L — HIGH (ref 22–26)
CO2 SERPL-SCNC: 14 MMOL/L — LOW (ref 22–31)
CO2 SERPL-SCNC: 15 MMOL/L — LOW (ref 22–31)
CO2 SERPL-SCNC: 16 MMOL/L — LOW (ref 22–31)
CO2 SERPL-SCNC: 17 MMOL/L
CO2 SERPL-SCNC: 17 MMOL/L — LOW (ref 22–31)
CO2 SERPL-SCNC: 18 MMOL/L — LOW (ref 22–31)
CO2 SERPL-SCNC: 19 MMOL/L — LOW (ref 22–31)
CO2 SERPL-SCNC: 20 MMOL/L
CO2 SERPL-SCNC: 20 MMOL/L — LOW (ref 22–31)
CO2 SERPL-SCNC: 21 MMOL/L
CO2 SERPL-SCNC: 21 MMOL/L — LOW (ref 22–31)
CO2 SERPL-SCNC: 22 MMOL/L — SIGNIFICANT CHANGE UP (ref 22–31)
CO2 SERPL-SCNC: 23 MMOL/L — SIGNIFICANT CHANGE UP (ref 22–31)
CO2 SERPL-SCNC: 24 MMOL/L — SIGNIFICANT CHANGE UP (ref 22–31)
CO2 SERPL-SCNC: 25 MMOL/L — SIGNIFICANT CHANGE UP (ref 22–31)
CO2 SERPL-SCNC: 26 MMOL/L — SIGNIFICANT CHANGE UP (ref 22–31)
CO2 SERPL-SCNC: 27 MMOL/L — SIGNIFICANT CHANGE UP (ref 22–31)
CO2 SERPL-SCNC: 28 MMOL/L — SIGNIFICANT CHANGE UP (ref 22–31)
CO2 SERPL-SCNC: 29 MMOL/L — SIGNIFICANT CHANGE UP (ref 22–31)
COLOR CSF: SIGNIFICANT CHANGE UP
COLOR FLD: SIGNIFICANT CHANGE UP
COLOR SPEC: SIGNIFICANT CHANGE UP
COLOR SPEC: YELLOW — SIGNIFICANT CHANGE UP
COLOR: YELLOW
COMMENT - URINE: SIGNIFICANT CHANGE UP
CREAT ?TM UR-MCNC: 129 MG/DL — SIGNIFICANT CHANGE UP
CREAT ?TM UR-MCNC: 38 MG/DL — SIGNIFICANT CHANGE UP
CREAT ?TM UR-MCNC: 52 MG/DL — SIGNIFICANT CHANGE UP
CREAT ?TM UR-MCNC: 72 MG/DL — SIGNIFICANT CHANGE UP
CREAT FLD-MCNC: 4.03 MG/DL — SIGNIFICANT CHANGE UP
CREAT SERPL-MCNC: 1.08 MG/DL — SIGNIFICANT CHANGE UP (ref 0.5–1.3)
CREAT SERPL-MCNC: 1.1 MG/DL — SIGNIFICANT CHANGE UP (ref 0.5–1.3)
CREAT SERPL-MCNC: 1.15 MG/DL — SIGNIFICANT CHANGE UP (ref 0.5–1.3)
CREAT SERPL-MCNC: 1.16 MG/DL — SIGNIFICANT CHANGE UP (ref 0.5–1.3)
CREAT SERPL-MCNC: 1.16 MG/DL — SIGNIFICANT CHANGE UP (ref 0.5–1.3)
CREAT SERPL-MCNC: 1.17 MG/DL — SIGNIFICANT CHANGE UP (ref 0.5–1.3)
CREAT SERPL-MCNC: 1.18 MG/DL — SIGNIFICANT CHANGE UP (ref 0.5–1.3)
CREAT SERPL-MCNC: 1.21 MG/DL — SIGNIFICANT CHANGE UP (ref 0.5–1.3)
CREAT SERPL-MCNC: 1.25 MG/DL — SIGNIFICANT CHANGE UP (ref 0.5–1.3)
CREAT SERPL-MCNC: 1.25 MG/DL — SIGNIFICANT CHANGE UP (ref 0.5–1.3)
CREAT SERPL-MCNC: 1.26 MG/DL — SIGNIFICANT CHANGE UP (ref 0.5–1.3)
CREAT SERPL-MCNC: 1.31 MG/DL — HIGH (ref 0.5–1.3)
CREAT SERPL-MCNC: 1.32 MG/DL — HIGH (ref 0.5–1.3)
CREAT SERPL-MCNC: 1.33 MG/DL — HIGH (ref 0.5–1.3)
CREAT SERPL-MCNC: 1.34 MG/DL — HIGH (ref 0.5–1.3)
CREAT SERPL-MCNC: 1.37 MG/DL — HIGH (ref 0.5–1.3)
CREAT SERPL-MCNC: 1.41 MG/DL — HIGH (ref 0.5–1.3)
CREAT SERPL-MCNC: 1.42 MG/DL — HIGH (ref 0.5–1.3)
CREAT SERPL-MCNC: 1.43 MG/DL — HIGH (ref 0.5–1.3)
CREAT SERPL-MCNC: 1.44 MG/DL — HIGH (ref 0.5–1.3)
CREAT SERPL-MCNC: 1.45 MG/DL — HIGH (ref 0.5–1.3)
CREAT SERPL-MCNC: 1.46 MG/DL — HIGH (ref 0.5–1.3)
CREAT SERPL-MCNC: 1.46 MG/DL — HIGH (ref 0.5–1.3)
CREAT SERPL-MCNC: 1.47 MG/DL — HIGH (ref 0.5–1.3)
CREAT SERPL-MCNC: 1.48 MG/DL — HIGH (ref 0.5–1.3)
CREAT SERPL-MCNC: 1.49 MG/DL — HIGH (ref 0.5–1.3)
CREAT SERPL-MCNC: 1.49 MG/DL — HIGH (ref 0.5–1.3)
CREAT SERPL-MCNC: 1.5 MG/DL — HIGH (ref 0.5–1.3)
CREAT SERPL-MCNC: 1.51 MG/DL — HIGH (ref 0.5–1.3)
CREAT SERPL-MCNC: 1.51 MG/DL — HIGH (ref 0.5–1.3)
CREAT SERPL-MCNC: 1.52 MG/DL — HIGH (ref 0.5–1.3)
CREAT SERPL-MCNC: 1.53 MG/DL — HIGH (ref 0.5–1.3)
CREAT SERPL-MCNC: 1.53 MG/DL — HIGH (ref 0.5–1.3)
CREAT SERPL-MCNC: 1.54 MG/DL — HIGH (ref 0.5–1.3)
CREAT SERPL-MCNC: 1.55 MG/DL — HIGH (ref 0.5–1.3)
CREAT SERPL-MCNC: 1.56 MG/DL — HIGH (ref 0.5–1.3)
CREAT SERPL-MCNC: 1.6 MG/DL — HIGH (ref 0.5–1.3)
CREAT SERPL-MCNC: 1.61 MG/DL — HIGH (ref 0.5–1.3)
CREAT SERPL-MCNC: 1.64 MG/DL — HIGH (ref 0.5–1.3)
CREAT SERPL-MCNC: 1.65 MG/DL — HIGH (ref 0.5–1.3)
CREAT SERPL-MCNC: 1.66 MG/DL — HIGH (ref 0.5–1.3)
CREAT SERPL-MCNC: 1.66 MG/DL — HIGH (ref 0.5–1.3)
CREAT SERPL-MCNC: 1.67 MG/DL — HIGH (ref 0.5–1.3)
CREAT SERPL-MCNC: 1.68 MG/DL — HIGH (ref 0.5–1.3)
CREAT SERPL-MCNC: 1.68 MG/DL — HIGH (ref 0.5–1.3)
CREAT SERPL-MCNC: 1.69 MG/DL — HIGH (ref 0.5–1.3)
CREAT SERPL-MCNC: 1.7 MG/DL — HIGH (ref 0.5–1.3)
CREAT SERPL-MCNC: 1.7 MG/DL — HIGH (ref 0.5–1.3)
CREAT SERPL-MCNC: 1.71 MG/DL — HIGH (ref 0.5–1.3)
CREAT SERPL-MCNC: 1.73 MG/DL — HIGH (ref 0.5–1.3)
CREAT SERPL-MCNC: 1.74 MG/DL — HIGH (ref 0.5–1.3)
CREAT SERPL-MCNC: 1.75 MG/DL — HIGH (ref 0.5–1.3)
CREAT SERPL-MCNC: 1.75 MG/DL — HIGH (ref 0.5–1.3)
CREAT SERPL-MCNC: 1.76 MG/DL — HIGH (ref 0.5–1.3)
CREAT SERPL-MCNC: 1.79 MG/DL — HIGH (ref 0.5–1.3)
CREAT SERPL-MCNC: 1.8 MG/DL — HIGH (ref 0.5–1.3)
CREAT SERPL-MCNC: 1.82 MG/DL — HIGH (ref 0.5–1.3)
CREAT SERPL-MCNC: 1.83 MG/DL — HIGH (ref 0.5–1.3)
CREAT SERPL-MCNC: 1.84 MG/DL — HIGH (ref 0.5–1.3)
CREAT SERPL-MCNC: 1.87 MG/DL — HIGH (ref 0.5–1.3)
CREAT SERPL-MCNC: 1.87 MG/DL — HIGH (ref 0.5–1.3)
CREAT SERPL-MCNC: 1.89 MG/DL — HIGH (ref 0.5–1.3)
CREAT SERPL-MCNC: 1.9 MG/DL — HIGH (ref 0.5–1.3)
CREAT SERPL-MCNC: 1.96 MG/DL — HIGH (ref 0.5–1.3)
CREAT SERPL-MCNC: 1.98 MG/DL — HIGH (ref 0.5–1.3)
CREAT SERPL-MCNC: 2.04 MG/DL — HIGH (ref 0.5–1.3)
CREAT SERPL-MCNC: 2.09 MG/DL — HIGH (ref 0.5–1.3)
CREAT SERPL-MCNC: 2.09 MG/DL — HIGH (ref 0.5–1.3)
CREAT SERPL-MCNC: 2.11 MG/DL — HIGH (ref 0.5–1.3)
CREAT SERPL-MCNC: 2.13 MG/DL — HIGH (ref 0.5–1.3)
CREAT SERPL-MCNC: 2.17 MG/DL — HIGH (ref 0.5–1.3)
CREAT SERPL-MCNC: 2.22 MG/DL — HIGH (ref 0.5–1.3)
CREAT SERPL-MCNC: 2.22 MG/DL — HIGH (ref 0.5–1.3)
CREAT SERPL-MCNC: 2.23 MG/DL
CREAT SERPL-MCNC: 2.23 MG/DL — HIGH (ref 0.5–1.3)
CREAT SERPL-MCNC: 2.24 MG/DL — HIGH (ref 0.5–1.3)
CREAT SERPL-MCNC: 2.31 MG/DL — HIGH (ref 0.5–1.3)
CREAT SERPL-MCNC: 2.32 MG/DL
CREAT SERPL-MCNC: 2.37 MG/DL — HIGH (ref 0.5–1.3)
CREAT SERPL-MCNC: 2.39 MG/DL — HIGH (ref 0.5–1.3)
CREAT SERPL-MCNC: 2.44 MG/DL — HIGH (ref 0.5–1.3)
CREAT SERPL-MCNC: 2.45 MG/DL — HIGH (ref 0.5–1.3)
CREAT SERPL-MCNC: 2.47 MG/DL
CREAT SERPL-MCNC: 2.52 MG/DL — HIGH (ref 0.5–1.3)
CREAT SERPL-MCNC: 2.52 MG/DL — HIGH (ref 0.5–1.3)
CREAT SERPL-MCNC: 2.57 MG/DL — HIGH (ref 0.5–1.3)
CREAT SERPL-MCNC: 2.65 MG/DL — HIGH (ref 0.5–1.3)
CREAT SERPL-MCNC: 2.67 MG/DL — HIGH (ref 0.5–1.3)
CREAT SERPL-MCNC: 2.71 MG/DL — HIGH (ref 0.5–1.3)
CREAT SERPL-MCNC: 2.86 MG/DL — HIGH (ref 0.5–1.3)
CREAT SERPL-MCNC: 2.91 MG/DL — HIGH (ref 0.5–1.3)
CREAT SERPL-MCNC: 2.91 MG/DL — HIGH (ref 0.5–1.3)
CREAT SERPL-MCNC: 3 MG/DL — HIGH (ref 0.5–1.3)
CREAT SERPL-MCNC: 3.14 MG/DL — HIGH (ref 0.5–1.3)
CREAT SERPL-MCNC: 3.17 MG/DL — HIGH (ref 0.5–1.3)
CREAT SERPL-MCNC: 3.17 MG/DL — HIGH (ref 0.5–1.3)
CREAT SERPL-MCNC: 3.21 MG/DL — HIGH (ref 0.5–1.3)
CREAT SERPL-MCNC: 3.23 MG/DL — HIGH (ref 0.5–1.3)
CREAT SERPL-MCNC: 3.23 MG/DL — HIGH (ref 0.5–1.3)
CREAT SERPL-MCNC: 3.29 MG/DL — HIGH (ref 0.5–1.3)
CREAT SERPL-MCNC: 3.34 MG/DL — HIGH (ref 0.5–1.3)
CREAT SERPL-MCNC: 3.37 MG/DL — HIGH (ref 0.5–1.3)
CREAT SERPL-MCNC: 3.41 MG/DL — HIGH (ref 0.5–1.3)
CREAT SERPL-MCNC: 3.43 MG/DL — HIGH (ref 0.5–1.3)
CREAT SERPL-MCNC: 3.44 MG/DL — HIGH (ref 0.5–1.3)
CREAT SERPL-MCNC: 3.48 MG/DL — HIGH (ref 0.5–1.3)
CREAT SERPL-MCNC: 3.5 MG/DL — HIGH (ref 0.5–1.3)
CREAT SERPL-MCNC: 3.51 MG/DL — HIGH (ref 0.5–1.3)
CREAT SERPL-MCNC: 3.51 MG/DL — HIGH (ref 0.5–1.3)
CREAT SERPL-MCNC: 3.54 MG/DL — HIGH (ref 0.5–1.3)
CREAT SERPL-MCNC: 3.55 MG/DL — HIGH (ref 0.5–1.3)
CREAT SERPL-MCNC: 3.56 MG/DL — HIGH (ref 0.5–1.3)
CREAT SERPL-MCNC: 3.59 MG/DL — HIGH (ref 0.5–1.3)
CREAT SERPL-MCNC: 3.63 MG/DL — HIGH (ref 0.5–1.3)
CREAT SERPL-MCNC: 3.63 MG/DL — HIGH (ref 0.5–1.3)
CREAT SERPL-MCNC: 3.68 MG/DL — HIGH (ref 0.5–1.3)
CREAT SERPL-MCNC: 3.68 MG/DL — HIGH (ref 0.5–1.3)
CREAT SERPL-MCNC: 3.69 MG/DL — HIGH (ref 0.5–1.3)
CREAT SERPL-MCNC: 3.7 MG/DL — HIGH (ref 0.5–1.3)
CREAT SERPL-MCNC: 3.72 MG/DL — HIGH (ref 0.5–1.3)
CREAT SERPL-MCNC: 3.77 MG/DL — HIGH (ref 0.5–1.3)
CREAT SERPL-MCNC: 3.78 MG/DL — HIGH (ref 0.5–1.3)
CREAT SERPL-MCNC: 3.79 MG/DL — HIGH (ref 0.5–1.3)
CREAT SERPL-MCNC: 3.84 MG/DL — HIGH (ref 0.5–1.3)
CREAT SERPL-MCNC: 3.88 MG/DL — HIGH (ref 0.5–1.3)
CREAT SERPL-MCNC: 3.88 MG/DL — HIGH (ref 0.5–1.3)
CREAT SERPL-MCNC: 3.89 MG/DL — HIGH (ref 0.5–1.3)
CREAT SERPL-MCNC: 3.91 MG/DL — HIGH (ref 0.5–1.3)
CREAT SERPL-MCNC: 3.95 MG/DL — HIGH (ref 0.5–1.3)
CREAT SERPL-MCNC: 3.95 MG/DL — HIGH (ref 0.5–1.3)
CREAT SERPL-MCNC: 3.97 MG/DL — HIGH (ref 0.5–1.3)
CREAT SERPL-MCNC: 3.98 MG/DL — HIGH (ref 0.5–1.3)
CREAT SERPL-MCNC: 3.99 MG/DL — HIGH (ref 0.5–1.3)
CREAT SERPL-MCNC: 4.09 MG/DL — HIGH (ref 0.5–1.3)
CREAT SERPL-MCNC: 4.13 MG/DL — HIGH (ref 0.5–1.3)
CREAT SERPL-MCNC: 4.14 MG/DL — HIGH (ref 0.5–1.3)
CREAT SERPL-MCNC: 4.17 MG/DL — HIGH (ref 0.5–1.3)
CREAT SERPL-MCNC: 4.2 MG/DL — HIGH (ref 0.5–1.3)
CREAT SERPL-MCNC: 4.24 MG/DL — HIGH (ref 0.5–1.3)
CREAT SERPL-MCNC: 4.25 MG/DL — HIGH (ref 0.5–1.3)
CREAT SERPL-MCNC: 4.26 MG/DL — HIGH (ref 0.5–1.3)
CREAT SERPL-MCNC: 4.4 MG/DL — HIGH (ref 0.5–1.3)
CREAT SERPL-MCNC: 4.54 MG/DL — HIGH (ref 0.5–1.3)
CREAT SERPL-MCNC: 4.58 MG/DL — HIGH (ref 0.5–1.3)
CREAT SERPL-MCNC: 4.59 MG/DL — HIGH (ref 0.5–1.3)
CREAT SERPL-MCNC: 4.65 MG/DL — HIGH (ref 0.5–1.3)
CREAT SERPL-MCNC: 4.65 MG/DL — HIGH (ref 0.5–1.3)
CREAT SERPL-MCNC: 4.7 MG/DL — HIGH (ref 0.5–1.3)
CREAT SERPL-MCNC: 4.7 MG/DL — HIGH (ref 0.5–1.3)
CREAT SERPL-MCNC: 4.78 MG/DL — HIGH (ref 0.5–1.3)
CREAT SERPL-MCNC: 4.8 MG/DL — HIGH (ref 0.5–1.3)
CREAT SERPL-MCNC: 4.83 MG/DL — HIGH (ref 0.5–1.3)
CREAT SERPL-MCNC: 4.84 MG/DL — HIGH (ref 0.5–1.3)
CREAT SERPL-MCNC: 5.03 MG/DL — HIGH (ref 0.5–1.3)
CREAT SERPL-MCNC: 5.07 MG/DL — HIGH (ref 0.5–1.3)
CREAT SERPL-MCNC: 5.08 MG/DL — HIGH (ref 0.5–1.3)
CREAT SERPL-MCNC: 5.1 MG/DL — HIGH (ref 0.5–1.3)
CREAT SERPL-MCNC: 5.11 MG/DL — HIGH (ref 0.5–1.3)
CREAT SERPL-MCNC: 5.12 MG/DL — HIGH (ref 0.5–1.3)
CREAT SERPL-MCNC: 5.14 MG/DL — HIGH (ref 0.5–1.3)
CREAT SERPL-MCNC: 5.33 MG/DL — HIGH (ref 0.5–1.3)
CREAT SERPL-MCNC: 5.35 MG/DL — HIGH (ref 0.5–1.3)
CREAT SERPL-MCNC: 5.44 MG/DL — HIGH (ref 0.5–1.3)
CREAT SERPL-MCNC: 5.48 MG/DL — HIGH (ref 0.5–1.3)
CREAT SERPL-MCNC: 5.62 MG/DL — HIGH (ref 0.5–1.3)
CREAT SERPL-MCNC: 5.62 MG/DL — HIGH (ref 0.5–1.3)
CREAT SERPL-MCNC: 5.64 MG/DL — HIGH (ref 0.5–1.3)
CREAT SERPL-MCNC: 5.65 MG/DL — HIGH (ref 0.5–1.3)
CREAT SERPL-MCNC: 5.68 MG/DL — HIGH (ref 0.5–1.3)
CREAT SERPL-MCNC: 5.72 MG/DL — HIGH (ref 0.5–1.3)
CREAT SERPL-MCNC: 5.79 MG/DL — HIGH (ref 0.5–1.3)
CREAT SERPL-MCNC: 6.01 MG/DL — HIGH (ref 0.5–1.3)
CREAT SERPL-MCNC: 6.08 MG/DL — HIGH (ref 0.5–1.3)
CREAT SERPL-MCNC: 6.18 MG/DL — HIGH (ref 0.5–1.3)
CREAT SERPL-MCNC: 6.21 MG/DL — HIGH (ref 0.5–1.3)
CREAT SERPL-MCNC: 6.23 MG/DL — HIGH (ref 0.5–1.3)
CREAT SERPL-MCNC: 6.29 MG/DL — HIGH (ref 0.5–1.3)
CREAT SERPL-MCNC: 6.33 MG/DL — HIGH (ref 0.5–1.3)
CREAT SERPL-MCNC: 6.39 MG/DL — HIGH (ref 0.5–1.3)
CREAT SERPL-MCNC: 6.42 MG/DL — HIGH (ref 0.5–1.3)
CREAT SERPL-MCNC: 6.44 MG/DL — HIGH (ref 0.5–1.3)
CREAT SERPL-MCNC: 6.46 MG/DL — HIGH (ref 0.5–1.3)
CREAT SERPL-MCNC: 6.56 MG/DL — HIGH (ref 0.5–1.3)
CREAT SERPL-MCNC: 7.1 MG/DL — HIGH (ref 0.5–1.3)
CREAT SERPL-MCNC: 8.97 MG/DL — HIGH (ref 0.5–1.3)
CREAT SPEC-SCNC: 59 MG/DL
CREAT/PROT UR: 1 RATIO
CRP SERPL-MCNC: 190 MG/L — HIGH
CRP SERPL-MCNC: 200 MG/L — HIGH
CRP SERPL-MCNC: 207 MG/L — HIGH (ref 0–4)
CRP SERPL-MCNC: 225 MG/L — HIGH (ref 0–4)
CRP SERPL-MCNC: 226 MG/L — HIGH (ref 0–4)
CRP SERPL-MCNC: 230 MG/L — HIGH (ref 0–4)
CRP SERPL-MCNC: 241 MG/L — HIGH (ref 0–4)
CRP SERPL-MCNC: 256 MG/L — HIGH (ref 0–4)
CRP SERPL-MCNC: 258 MG/L — HIGH (ref 0–4)
CRP SERPL-MCNC: 288 MG/L — HIGH (ref 0–4)
CRP SERPL-MCNC: 302 MG/L — HIGH (ref 0–4)
CRYPTOC AG CSF-ACNC: NEGATIVE — SIGNIFICANT CHANGE UP
CSF PCR RESULT: SIGNIFICANT CHANGE UP
CULTURE RESULTS: NO GROWTH — SIGNIFICANT CHANGE UP
CULTURE RESULTS: NO GROWTH — SIGNIFICANT CHANGE UP
CULTURE RESULTS: SIGNIFICANT CHANGE UP
DACRYOCYTES BLD QL SMEAR: SLIGHT — SIGNIFICANT CHANGE UP
DIFF PNL FLD: ABNORMAL
DIFF PNL FLD: NEGATIVE — SIGNIFICANT CHANGE UP
EBV DNA SERPL NAA+PROBE-ACNC: SIGNIFICANT CHANGE UP IU/ML
EBV DNA SERPL NAA+PROBE-ACNC: SIGNIFICANT CHANGE UP IU/ML
EBV EA AB SER IA-ACNC: 6.9 U/ML — SIGNIFICANT CHANGE UP
EBV EA AB TITR SER IF: POSITIVE
EBV EA IGG SER-ACNC: NEGATIVE — SIGNIFICANT CHANGE UP
EBV NA IGG SER IA-ACNC: >600 U/ML — HIGH
EBV PATRN SPEC IB-IMP: SIGNIFICANT CHANGE UP
EBV VCA IGG AVIDITY SER QL IA: ABNORMAL
EBV VCA IGM SER IA-ACNC: 20.8 U/ML — HIGH
EBV VCA IGM SER IA-ACNC: <10 U/ML — SIGNIFICANT CHANGE UP
EBV VCA IGM TITR FLD: NEGATIVE — SIGNIFICANT CHANGE UP
EBVPCR LOG: SIGNIFICANT CHANGE UP LOG10IU/ML
EBVPCR LOG: SIGNIFICANT CHANGE UP LOG10IU/ML
EGFR: 10 ML/MIN/1.73M2 — LOW
EGFR: 11 ML/MIN/1.73M2 — LOW
EGFR: 12 ML/MIN/1.73M2 — LOW
EGFR: 13 ML/MIN/1.73M2 — LOW
EGFR: 14 ML/MIN/1.73M2 — LOW
EGFR: 14 ML/MIN/1.73M2 — LOW
EGFR: 15 ML/MIN/1.73M2 — LOW
EGFR: 16 ML/MIN/1.73M2 — LOW
EGFR: 17 ML/MIN/1.73M2 — LOW
EGFR: 18 ML/MIN/1.73M2 — LOW
EGFR: 19 ML/MIN/1.73M2 — LOW
EGFR: 20 ML/MIN/1.73M2 — LOW
EGFR: 21 ML/MIN/1.73M2 — LOW
EGFR: 22 ML/MIN/1.73M2 — LOW
EGFR: 23 ML/MIN/1.73M2 — LOW
EGFR: 25 ML/MIN/1.73M2 — LOW
EGFR: 25 ML/MIN/1.73M2 — LOW
EGFR: 26 ML/MIN/1.73M2 — LOW
EGFR: 27 ML/MIN/1.73M2 — LOW
EGFR: 28 ML/MIN/1.73M2
EGFR: 28 ML/MIN/1.73M2 — LOW
EGFR: 28 ML/MIN/1.73M2 — LOW
EGFR: 29 ML/MIN/1.73M2 — LOW
EGFR: 29 ML/MIN/1.73M2 — LOW
EGFR: 30 ML/MIN/1.73M2
EGFR: 30 ML/MIN/1.73M2 — LOW
EGFR: 31 ML/MIN/1.73M2 — LOW
EGFR: 32 ML/MIN/1.73M2
EGFR: 32 ML/MIN/1.73M2 — LOW
EGFR: 33 ML/MIN/1.73M2 — LOW
EGFR: 33 ML/MIN/1.73M2 — LOW
EGFR: 34 ML/MIN/1.73M2 — LOW
EGFR: 35 ML/MIN/1.73M2 — LOW
EGFR: 36 ML/MIN/1.73M2 — LOW
EGFR: 37 ML/MIN/1.73M2 — LOW
EGFR: 38 ML/MIN/1.73M2 — LOW
EGFR: 38 ML/MIN/1.73M2 — LOW
EGFR: 39 ML/MIN/1.73M2 — LOW
EGFR: 39 ML/MIN/1.73M2 — LOW
EGFR: 40 ML/MIN/1.73M2 — LOW
EGFR: 41 ML/MIN/1.73M2 — LOW
EGFR: 41 ML/MIN/1.73M2 — LOW
EGFR: 42 ML/MIN/1.73M2 — LOW
EGFR: 43 ML/MIN/1.73M2 — LOW
EGFR: 43 ML/MIN/1.73M2 — LOW
EGFR: 44 ML/MIN/1.73M2 — LOW
EGFR: 45 ML/MIN/1.73M2 — LOW
EGFR: 46 ML/MIN/1.73M2 — LOW
EGFR: 47 ML/MIN/1.73M2 — LOW
EGFR: 47 ML/MIN/1.73M2 — LOW
EGFR: 48 ML/MIN/1.73M2 — LOW
EGFR: 49 ML/MIN/1.73M2 — LOW
EGFR: 49 ML/MIN/1.73M2 — LOW
EGFR: 50 ML/MIN/1.73M2 — LOW
EGFR: 51 ML/MIN/1.73M2 — LOW
EGFR: 52 ML/MIN/1.73M2 — LOW
EGFR: 53 ML/MIN/1.73M2 — LOW
EGFR: 54 ML/MIN/1.73M2 — LOW
EGFR: 54 ML/MIN/1.73M2 — LOW
EGFR: 55 ML/MIN/1.73M2 — LOW
EGFR: 57 ML/MIN/1.73M2 — LOW
EGFR: 58 ML/MIN/1.73M2 — LOW
EGFR: 59 ML/MIN/1.73M2 — LOW
EGFR: 59 ML/MIN/1.73M2 — LOW
EGFR: 60 ML/MIN/1.73M2 — SIGNIFICANT CHANGE UP
EGFR: 63 ML/MIN/1.73M2 — SIGNIFICANT CHANGE UP
EGFR: 66 ML/MIN/1.73M2 — SIGNIFICANT CHANGE UP
EGFR: 68 ML/MIN/1.73M2 — SIGNIFICANT CHANGE UP
EGFR: 68 ML/MIN/1.73M2 — SIGNIFICANT CHANGE UP
EGFR: 69 ML/MIN/1.73M2 — SIGNIFICANT CHANGE UP
EGFR: 69 ML/MIN/1.73M2 — SIGNIFICANT CHANGE UP
EGFR: 70 ML/MIN/1.73M2 — SIGNIFICANT CHANGE UP
EGFR: 74 ML/MIN/1.73M2 — SIGNIFICANT CHANGE UP
EGFR: 75 ML/MIN/1.73M2 — SIGNIFICANT CHANGE UP
EGFR: 8 ML/MIN/1.73M2 — LOW
EGFR: 9 ML/MIN/1.73M2 — LOW
ELLIPTOCYTES BLD QL SMEAR: SLIGHT — SIGNIFICANT CHANGE UP
EOSINOPHIL # BLD AUTO: 0 K/UL — SIGNIFICANT CHANGE UP (ref 0–0.5)
EOSINOPHIL # BLD AUTO: 0.01 K/UL — SIGNIFICANT CHANGE UP (ref 0–0.5)
EOSINOPHIL # BLD AUTO: 0.02 K/UL
EOSINOPHIL # BLD AUTO: 0.02 K/UL — SIGNIFICANT CHANGE UP (ref 0–0.5)
EOSINOPHIL # BLD AUTO: 0.02 K/UL — SIGNIFICANT CHANGE UP (ref 0–0.5)
EOSINOPHIL # BLD AUTO: 0.03 K/UL — SIGNIFICANT CHANGE UP (ref 0–0.5)
EOSINOPHIL # BLD AUTO: 0.04 K/UL — SIGNIFICANT CHANGE UP (ref 0–0.5)
EOSINOPHIL # BLD AUTO: 0.05 K/UL — SIGNIFICANT CHANGE UP (ref 0–0.5)
EOSINOPHIL # BLD AUTO: 0.06 K/UL — SIGNIFICANT CHANGE UP (ref 0–0.5)
EOSINOPHIL # BLD AUTO: 0.07 K/UL — SIGNIFICANT CHANGE UP (ref 0–0.5)
EOSINOPHIL # BLD AUTO: 0.07 K/UL — SIGNIFICANT CHANGE UP (ref 0–0.5)
EOSINOPHIL # BLD AUTO: 0.08 K/UL — SIGNIFICANT CHANGE UP (ref 0–0.5)
EOSINOPHIL # BLD AUTO: 0.09 K/UL — SIGNIFICANT CHANGE UP (ref 0–0.5)
EOSINOPHIL # BLD AUTO: 0.1 K/UL — SIGNIFICANT CHANGE UP (ref 0–0.5)
EOSINOPHIL # BLD AUTO: 0.11 K/UL — SIGNIFICANT CHANGE UP (ref 0–0.5)
EOSINOPHIL # BLD AUTO: 0.14 K/UL — SIGNIFICANT CHANGE UP (ref 0–0.5)
EOSINOPHIL # BLD AUTO: 0.14 K/UL — SIGNIFICANT CHANGE UP (ref 0–0.5)
EOSINOPHIL # BLD AUTO: 0.15 K/UL — SIGNIFICANT CHANGE UP (ref 0–0.5)
EOSINOPHIL # BLD AUTO: 0.16 K/UL — SIGNIFICANT CHANGE UP (ref 0–0.5)
EOSINOPHIL # BLD AUTO: 0.17 K/UL — SIGNIFICANT CHANGE UP (ref 0–0.5)
EOSINOPHIL # BLD AUTO: 0.18 K/UL — SIGNIFICANT CHANGE UP (ref 0–0.5)
EOSINOPHIL # BLD AUTO: 0.19 K/UL — SIGNIFICANT CHANGE UP (ref 0–0.5)
EOSINOPHIL # BLD AUTO: 0.22 K/UL — SIGNIFICANT CHANGE UP (ref 0–0.5)
EOSINOPHIL # BLD AUTO: 0.24 K/UL — SIGNIFICANT CHANGE UP (ref 0–0.5)
EOSINOPHIL # BLD AUTO: 0.24 K/UL — SIGNIFICANT CHANGE UP (ref 0–0.5)
EOSINOPHIL # BLD AUTO: 0.25 K/UL — SIGNIFICANT CHANGE UP (ref 0–0.5)
EOSINOPHIL # BLD AUTO: 0.26 K/UL — SIGNIFICANT CHANGE UP (ref 0–0.5)
EOSINOPHIL # BLD AUTO: 0.26 K/UL — SIGNIFICANT CHANGE UP (ref 0–0.5)
EOSINOPHIL # BLD AUTO: 0.29 K/UL — SIGNIFICANT CHANGE UP (ref 0–0.5)
EOSINOPHIL # BLD AUTO: 0.29 K/UL — SIGNIFICANT CHANGE UP (ref 0–0.5)
EOSINOPHIL # BLD AUTO: 0.31 K/UL — SIGNIFICANT CHANGE UP (ref 0–0.5)
EOSINOPHIL # BLD AUTO: 0.33 K/UL — SIGNIFICANT CHANGE UP (ref 0–0.5)
EOSINOPHIL # BLD AUTO: 0.33 K/UL — SIGNIFICANT CHANGE UP (ref 0–0.5)
EOSINOPHIL # BLD AUTO: 0.41 K/UL — SIGNIFICANT CHANGE UP (ref 0–0.5)
EOSINOPHIL # BLD AUTO: 0.41 K/UL — SIGNIFICANT CHANGE UP (ref 0–0.5)
EOSINOPHIL # BLD AUTO: 0.5 K/UL — SIGNIFICANT CHANGE UP (ref 0–0.5)
EOSINOPHIL # FLD: 1 % — SIGNIFICANT CHANGE UP
EOSINOPHIL NFR BLD AUTO: 0 % — SIGNIFICANT CHANGE UP (ref 0–6)
EOSINOPHIL NFR BLD AUTO: 0.1 % — SIGNIFICANT CHANGE UP (ref 0–6)
EOSINOPHIL NFR BLD AUTO: 0.2 %
EOSINOPHIL NFR BLD AUTO: 0.2 % — SIGNIFICANT CHANGE UP (ref 0–6)
EOSINOPHIL NFR BLD AUTO: 0.3 %
EOSINOPHIL NFR BLD AUTO: 0.3 %
EOSINOPHIL NFR BLD AUTO: 0.3 % — SIGNIFICANT CHANGE UP (ref 0–6)
EOSINOPHIL NFR BLD AUTO: 0.5 % — SIGNIFICANT CHANGE UP (ref 0–6)
EOSINOPHIL NFR BLD AUTO: 0.6 % — SIGNIFICANT CHANGE UP (ref 0–6)
EOSINOPHIL NFR BLD AUTO: 0.8 % — SIGNIFICANT CHANGE UP (ref 0–6)
EOSINOPHIL NFR BLD AUTO: 0.9 % — SIGNIFICANT CHANGE UP (ref 0–6)
EOSINOPHIL NFR BLD AUTO: 0.9 % — SIGNIFICANT CHANGE UP (ref 0–6)
EOSINOPHIL NFR BLD AUTO: 1 % — SIGNIFICANT CHANGE UP (ref 0–6)
EOSINOPHIL NFR BLD AUTO: 1 % — SIGNIFICANT CHANGE UP (ref 0–6)
EOSINOPHIL NFR BLD AUTO: 1.2 % — SIGNIFICANT CHANGE UP (ref 0–6)
EOSINOPHIL NFR BLD AUTO: 1.3 % — SIGNIFICANT CHANGE UP (ref 0–6)
EOSINOPHIL NFR BLD AUTO: 1.3 % — SIGNIFICANT CHANGE UP (ref 0–6)
EOSINOPHIL NFR BLD AUTO: 1.5 % — SIGNIFICANT CHANGE UP (ref 0–6)
EOSINOPHIL NFR BLD AUTO: 1.6 % — SIGNIFICANT CHANGE UP (ref 0–6)
EOSINOPHIL NFR BLD AUTO: 1.6 % — SIGNIFICANT CHANGE UP (ref 0–6)
EOSINOPHIL NFR BLD AUTO: 1.7 % — SIGNIFICANT CHANGE UP (ref 0–6)
EOSINOPHIL NFR BLD AUTO: 1.8 % — SIGNIFICANT CHANGE UP (ref 0–6)
EOSINOPHIL NFR BLD AUTO: 1.8 % — SIGNIFICANT CHANGE UP (ref 0–6)
EOSINOPHIL NFR BLD AUTO: 1.9 % — SIGNIFICANT CHANGE UP (ref 0–6)
EOSINOPHIL NFR BLD AUTO: 2 % — SIGNIFICANT CHANGE UP (ref 0–6)
EOSINOPHIL NFR BLD AUTO: 2.2 % — SIGNIFICANT CHANGE UP (ref 0–6)
EOSINOPHIL NFR BLD AUTO: 2.3 % — SIGNIFICANT CHANGE UP (ref 0–6)
EOSINOPHIL NFR BLD AUTO: 2.5 % — SIGNIFICANT CHANGE UP (ref 0–6)
EOSINOPHIL NFR BLD AUTO: 2.6 % — SIGNIFICANT CHANGE UP (ref 0–6)
EOSINOPHIL NFR BLD AUTO: 2.6 % — SIGNIFICANT CHANGE UP (ref 0–6)
EOSINOPHIL NFR BLD AUTO: 3 % — SIGNIFICANT CHANGE UP (ref 0–6)
EOSINOPHIL NFR BLD AUTO: 3.2 % — SIGNIFICANT CHANGE UP (ref 0–6)
EOSINOPHIL NFR BLD AUTO: 3.4 % — SIGNIFICANT CHANGE UP (ref 0–6)
EOSINOPHIL NFR BLD AUTO: 3.5 % — SIGNIFICANT CHANGE UP (ref 0–6)
EOSINOPHIL NFR BLD AUTO: 3.5 % — SIGNIFICANT CHANGE UP (ref 0–6)
EOSINOPHIL NFR BLD AUTO: 3.7 % — SIGNIFICANT CHANGE UP (ref 0–6)
EOSINOPHIL NFR BLD AUTO: 3.7 % — SIGNIFICANT CHANGE UP (ref 0–6)
EOSINOPHIL NFR BLD AUTO: 3.8 % — SIGNIFICANT CHANGE UP (ref 0–6)
EOSINOPHIL NFR BLD AUTO: 4.2 % — SIGNIFICANT CHANGE UP (ref 0–6)
EOSINOPHIL NFR BLD AUTO: 4.3 % — SIGNIFICANT CHANGE UP (ref 0–6)
EOSINOPHIL NFR BLD AUTO: 4.4 % — SIGNIFICANT CHANGE UP (ref 0–6)
EOSINOPHIL NFR BLD AUTO: 4.6 % — SIGNIFICANT CHANGE UP (ref 0–6)
EOSINOPHIL NFR BLD AUTO: 5.1 % — SIGNIFICANT CHANGE UP (ref 0–6)
EOSINOPHIL NFR BLD AUTO: 5.5 % — SIGNIFICANT CHANGE UP (ref 0–6)
EOSINOPHIL NFR BLD AUTO: 5.5 % — SIGNIFICANT CHANGE UP (ref 0–6)
EOSINOPHIL NFR BLD AUTO: 6.8 % — HIGH (ref 0–6)
EOSINOPHIL NFR BLD AUTO: 7.8 % — HIGH (ref 0–6)
EPI CELLS # UR: 0 /HPF — SIGNIFICANT CHANGE UP
EPI CELLS # UR: 1 /HPF — SIGNIFICANT CHANGE UP
EPI CELLS # UR: 4 — SIGNIFICANT CHANGE UP
ESTIMATED AVERAGE GLUCOSE: 126 MG/DL — HIGH (ref 68–114)
ESTIMATED AVERAGE GLUCOSE: 137 MG/DL — HIGH (ref 68–114)
ESTIMATED AVERAGE GLUCOSE: 143 MG/DL — HIGH (ref 68–114)
ESTIMATED AVERAGE GLUCOSE: 160 MG/DL — HIGH (ref 68–114)
ESTIMATED AVERAGE GLUCOSE: 163 MG/DL — HIGH (ref 68–114)
ESTIMATED AVERAGE GLUCOSE: 171 MG/DL — HIGH (ref 68–114)
EVEROLIMUS, WHOLE BLOOD RESULT: 0.5 NG/ML — LOW (ref 3–8)
FERRITIN SERPL-MCNC: 1221 NG/ML — HIGH (ref 30–400)
FERRITIN SERPL-MCNC: 1287 NG/ML — HIGH (ref 30–400)
FERRITIN SERPL-MCNC: 1505 NG/ML — HIGH (ref 30–400)
FERRITIN SERPL-MCNC: 6336 NG/ML — HIGH (ref 30–400)
FIBRINOGEN PPP-MCNC: 472 MG/DL — SIGNIFICANT CHANGE UP (ref 330–520)
FIBRINOGEN PPP-MCNC: 792 MG/DL — HIGH (ref 330–520)
FLUAV AG NPH QL: SIGNIFICANT CHANGE UP
FLUAV SUBTYP SPEC NAA+PROBE: SIGNIFICANT CHANGE UP
FLUBV AG NPH QL: SIGNIFICANT CHANGE UP
FLUBV RNA SPEC QL NAA+PROBE: SIGNIFICANT CHANGE UP
FLUID INTAKE SUBSTANCE CLASS: SIGNIFICANT CHANGE UP
FOLATE SERPL-MCNC: 16.2 NG/ML — SIGNIFICANT CHANGE UP
FOLATE SERPL-MCNC: >20 NG/ML — SIGNIFICANT CHANGE UP
FUNGITELL: <31 PG/ML — SIGNIFICANT CHANGE UP
G6PD RBC-CCNC: 20.3 U/G HGB — SIGNIFICANT CHANGE UP (ref 7–20.5)
GALACTOMANNAN AG SERPL-ACNC: 0.03 INDEX — SIGNIFICANT CHANGE UP (ref 0–0.49)
GAMMA GLOBULIN: 1.5 G/DL — SIGNIFICANT CHANGE UP (ref 0.6–1.6)
GAS PNL BLDA: SIGNIFICANT CHANGE UP
GAS PNL BLDV: 125 MMOL/L — LOW (ref 136–145)
GAS PNL BLDV: 127 MMOL/L — LOW (ref 136–145)
GAS PNL BLDV: 127 MMOL/L — LOW (ref 136–145)
GAS PNL BLDV: 132 MMOL/L — LOW (ref 136–145)
GAS PNL BLDV: 135 MMOL/L — LOW (ref 136–145)
GAS PNL BLDV: 135 MMOL/L — LOW (ref 136–145)
GAS PNL BLDV: 136 MMOL/L — SIGNIFICANT CHANGE UP (ref 136–145)
GAS PNL BLDV: 137 MMOL/L — SIGNIFICANT CHANGE UP (ref 136–145)
GAS PNL BLDV: 138 MMOL/L — SIGNIFICANT CHANGE UP (ref 136–145)
GAS PNL BLDV: 139 MMOL/L — SIGNIFICANT CHANGE UP (ref 136–145)
GAS PNL BLDV: 141 MMOL/L — SIGNIFICANT CHANGE UP (ref 136–145)
GAS PNL BLDV: SIGNIFICANT CHANGE UP
GIANT PLATELETS BLD QL SMEAR: PRESENT — SIGNIFICANT CHANGE UP
GIANT PLATELETS BLD QL SMEAR: PRESENT — SIGNIFICANT CHANGE UP
GLUCOSE BLDC GLUCOMTR-MCNC: 100 MG/DL — HIGH (ref 70–99)
GLUCOSE BLDC GLUCOMTR-MCNC: 101 MG/DL — HIGH (ref 70–99)
GLUCOSE BLDC GLUCOMTR-MCNC: 102 MG/DL — HIGH (ref 70–99)
GLUCOSE BLDC GLUCOMTR-MCNC: 103 MG/DL — HIGH (ref 70–99)
GLUCOSE BLDC GLUCOMTR-MCNC: 104 MG/DL — HIGH (ref 70–99)
GLUCOSE BLDC GLUCOMTR-MCNC: 105 MG/DL — HIGH (ref 70–99)
GLUCOSE BLDC GLUCOMTR-MCNC: 105 MG/DL — HIGH (ref 70–99)
GLUCOSE BLDC GLUCOMTR-MCNC: 106 MG/DL — HIGH (ref 70–99)
GLUCOSE BLDC GLUCOMTR-MCNC: 107 MG/DL — HIGH (ref 70–99)
GLUCOSE BLDC GLUCOMTR-MCNC: 108 MG/DL — HIGH (ref 70–99)
GLUCOSE BLDC GLUCOMTR-MCNC: 109 MG/DL — HIGH (ref 70–99)
GLUCOSE BLDC GLUCOMTR-MCNC: 110 MG/DL — HIGH (ref 70–99)
GLUCOSE BLDC GLUCOMTR-MCNC: 110 MG/DL — HIGH (ref 70–99)
GLUCOSE BLDC GLUCOMTR-MCNC: 111 MG/DL — HIGH (ref 70–99)
GLUCOSE BLDC GLUCOMTR-MCNC: 112 MG/DL — HIGH (ref 70–99)
GLUCOSE BLDC GLUCOMTR-MCNC: 113 MG/DL — HIGH (ref 70–99)
GLUCOSE BLDC GLUCOMTR-MCNC: 114 MG/DL — HIGH (ref 70–99)
GLUCOSE BLDC GLUCOMTR-MCNC: 115 MG/DL — HIGH (ref 70–99)
GLUCOSE BLDC GLUCOMTR-MCNC: 116 MG/DL — HIGH (ref 70–99)
GLUCOSE BLDC GLUCOMTR-MCNC: 116 MG/DL — HIGH (ref 70–99)
GLUCOSE BLDC GLUCOMTR-MCNC: 117 MG/DL — HIGH (ref 70–99)
GLUCOSE BLDC GLUCOMTR-MCNC: 117 MG/DL — HIGH (ref 70–99)
GLUCOSE BLDC GLUCOMTR-MCNC: 118 MG/DL — HIGH (ref 70–99)
GLUCOSE BLDC GLUCOMTR-MCNC: 119 MG/DL — HIGH (ref 70–99)
GLUCOSE BLDC GLUCOMTR-MCNC: 120 MG/DL — HIGH (ref 70–99)
GLUCOSE BLDC GLUCOMTR-MCNC: 121 MG/DL — HIGH (ref 70–99)
GLUCOSE BLDC GLUCOMTR-MCNC: 122 MG/DL — HIGH (ref 70–99)
GLUCOSE BLDC GLUCOMTR-MCNC: 123 MG/DL — HIGH (ref 70–99)
GLUCOSE BLDC GLUCOMTR-MCNC: 124 MG/DL — HIGH (ref 70–99)
GLUCOSE BLDC GLUCOMTR-MCNC: 125 MG/DL — HIGH (ref 70–99)
GLUCOSE BLDC GLUCOMTR-MCNC: 126 MG/DL — HIGH (ref 70–99)
GLUCOSE BLDC GLUCOMTR-MCNC: 126 MG/DL — HIGH (ref 70–99)
GLUCOSE BLDC GLUCOMTR-MCNC: 127 MG/DL — HIGH (ref 70–99)
GLUCOSE BLDC GLUCOMTR-MCNC: 127 MG/DL — HIGH (ref 70–99)
GLUCOSE BLDC GLUCOMTR-MCNC: 128 MG/DL — HIGH (ref 70–99)
GLUCOSE BLDC GLUCOMTR-MCNC: 129 MG/DL — HIGH (ref 70–99)
GLUCOSE BLDC GLUCOMTR-MCNC: 130 MG/DL — HIGH (ref 70–99)
GLUCOSE BLDC GLUCOMTR-MCNC: 131 MG/DL — HIGH (ref 70–99)
GLUCOSE BLDC GLUCOMTR-MCNC: 132 MG/DL — HIGH (ref 70–99)
GLUCOSE BLDC GLUCOMTR-MCNC: 133 MG/DL — HIGH (ref 70–99)
GLUCOSE BLDC GLUCOMTR-MCNC: 134 MG/DL — HIGH (ref 70–99)
GLUCOSE BLDC GLUCOMTR-MCNC: 135 MG/DL — HIGH (ref 70–99)
GLUCOSE BLDC GLUCOMTR-MCNC: 136 MG/DL — HIGH (ref 70–99)
GLUCOSE BLDC GLUCOMTR-MCNC: 137 MG/DL — HIGH (ref 70–99)
GLUCOSE BLDC GLUCOMTR-MCNC: 138 MG/DL — HIGH (ref 70–99)
GLUCOSE BLDC GLUCOMTR-MCNC: 139 MG/DL — HIGH (ref 70–99)
GLUCOSE BLDC GLUCOMTR-MCNC: 139 MG/DL — HIGH (ref 70–99)
GLUCOSE BLDC GLUCOMTR-MCNC: 140 MG/DL — HIGH (ref 70–99)
GLUCOSE BLDC GLUCOMTR-MCNC: 141 MG/DL — HIGH (ref 70–99)
GLUCOSE BLDC GLUCOMTR-MCNC: 142 MG/DL — HIGH (ref 70–99)
GLUCOSE BLDC GLUCOMTR-MCNC: 143 MG/DL — HIGH (ref 70–99)
GLUCOSE BLDC GLUCOMTR-MCNC: 144 MG/DL — HIGH (ref 70–99)
GLUCOSE BLDC GLUCOMTR-MCNC: 145 MG/DL — HIGH (ref 70–99)
GLUCOSE BLDC GLUCOMTR-MCNC: 146 MG/DL — HIGH (ref 70–99)
GLUCOSE BLDC GLUCOMTR-MCNC: 147 MG/DL — HIGH (ref 70–99)
GLUCOSE BLDC GLUCOMTR-MCNC: 148 MG/DL — HIGH (ref 70–99)
GLUCOSE BLDC GLUCOMTR-MCNC: 148 MG/DL — HIGH (ref 70–99)
GLUCOSE BLDC GLUCOMTR-MCNC: 149 MG/DL — HIGH (ref 70–99)
GLUCOSE BLDC GLUCOMTR-MCNC: 150 MG/DL — HIGH (ref 70–99)
GLUCOSE BLDC GLUCOMTR-MCNC: 151 MG/DL — HIGH (ref 70–99)
GLUCOSE BLDC GLUCOMTR-MCNC: 152 MG/DL — HIGH (ref 70–99)
GLUCOSE BLDC GLUCOMTR-MCNC: 153 MG/DL — HIGH (ref 70–99)
GLUCOSE BLDC GLUCOMTR-MCNC: 154 MG/DL — HIGH (ref 70–99)
GLUCOSE BLDC GLUCOMTR-MCNC: 155 MG/DL — HIGH (ref 70–99)
GLUCOSE BLDC GLUCOMTR-MCNC: 155 MG/DL — HIGH (ref 70–99)
GLUCOSE BLDC GLUCOMTR-MCNC: 156 MG/DL — HIGH (ref 70–99)
GLUCOSE BLDC GLUCOMTR-MCNC: 157 MG/DL — HIGH (ref 70–99)
GLUCOSE BLDC GLUCOMTR-MCNC: 158 MG/DL — HIGH (ref 70–99)
GLUCOSE BLDC GLUCOMTR-MCNC: 159 MG/DL — HIGH (ref 70–99)
GLUCOSE BLDC GLUCOMTR-MCNC: 161 MG/DL — HIGH (ref 70–99)
GLUCOSE BLDC GLUCOMTR-MCNC: 162 MG/DL — HIGH (ref 70–99)
GLUCOSE BLDC GLUCOMTR-MCNC: 163 MG/DL — HIGH (ref 70–99)
GLUCOSE BLDC GLUCOMTR-MCNC: 164 MG/DL — HIGH (ref 70–99)
GLUCOSE BLDC GLUCOMTR-MCNC: 165 MG/DL — HIGH (ref 70–99)
GLUCOSE BLDC GLUCOMTR-MCNC: 166 MG/DL — HIGH (ref 70–99)
GLUCOSE BLDC GLUCOMTR-MCNC: 167 MG/DL — HIGH (ref 70–99)
GLUCOSE BLDC GLUCOMTR-MCNC: 168 MG/DL — HIGH (ref 70–99)
GLUCOSE BLDC GLUCOMTR-MCNC: 169 MG/DL — HIGH (ref 70–99)
GLUCOSE BLDC GLUCOMTR-MCNC: 169 MG/DL — HIGH (ref 70–99)
GLUCOSE BLDC GLUCOMTR-MCNC: 170 MG/DL — HIGH (ref 70–99)
GLUCOSE BLDC GLUCOMTR-MCNC: 171 MG/DL — HIGH (ref 70–99)
GLUCOSE BLDC GLUCOMTR-MCNC: 172 MG/DL — HIGH (ref 70–99)
GLUCOSE BLDC GLUCOMTR-MCNC: 173 MG/DL — HIGH (ref 70–99)
GLUCOSE BLDC GLUCOMTR-MCNC: 174 MG/DL — HIGH (ref 70–99)
GLUCOSE BLDC GLUCOMTR-MCNC: 175 MG/DL — HIGH (ref 70–99)
GLUCOSE BLDC GLUCOMTR-MCNC: 175 MG/DL — HIGH (ref 70–99)
GLUCOSE BLDC GLUCOMTR-MCNC: 176 MG/DL — HIGH (ref 70–99)
GLUCOSE BLDC GLUCOMTR-MCNC: 177 MG/DL — HIGH (ref 70–99)
GLUCOSE BLDC GLUCOMTR-MCNC: 177 MG/DL — HIGH (ref 70–99)
GLUCOSE BLDC GLUCOMTR-MCNC: 178 MG/DL — HIGH (ref 70–99)
GLUCOSE BLDC GLUCOMTR-MCNC: 179 MG/DL — HIGH (ref 70–99)
GLUCOSE BLDC GLUCOMTR-MCNC: 180 MG/DL — HIGH (ref 70–99)
GLUCOSE BLDC GLUCOMTR-MCNC: 181 MG/DL — HIGH (ref 70–99)
GLUCOSE BLDC GLUCOMTR-MCNC: 181 MG/DL — HIGH (ref 70–99)
GLUCOSE BLDC GLUCOMTR-MCNC: 182 MG/DL — HIGH (ref 70–99)
GLUCOSE BLDC GLUCOMTR-MCNC: 183 MG/DL — HIGH (ref 70–99)
GLUCOSE BLDC GLUCOMTR-MCNC: 184 MG/DL — HIGH (ref 70–99)
GLUCOSE BLDC GLUCOMTR-MCNC: 185 MG/DL — HIGH (ref 70–99)
GLUCOSE BLDC GLUCOMTR-MCNC: 186 MG/DL — HIGH (ref 70–99)
GLUCOSE BLDC GLUCOMTR-MCNC: 187 MG/DL — HIGH (ref 70–99)
GLUCOSE BLDC GLUCOMTR-MCNC: 188 MG/DL — HIGH (ref 70–99)
GLUCOSE BLDC GLUCOMTR-MCNC: 188 MG/DL — HIGH (ref 70–99)
GLUCOSE BLDC GLUCOMTR-MCNC: 189 MG/DL — HIGH (ref 70–99)
GLUCOSE BLDC GLUCOMTR-MCNC: 190 MG/DL — HIGH (ref 70–99)
GLUCOSE BLDC GLUCOMTR-MCNC: 191 MG/DL — HIGH (ref 70–99)
GLUCOSE BLDC GLUCOMTR-MCNC: 191 MG/DL — HIGH (ref 70–99)
GLUCOSE BLDC GLUCOMTR-MCNC: 192 MG/DL — HIGH (ref 70–99)
GLUCOSE BLDC GLUCOMTR-MCNC: 193 MG/DL — HIGH (ref 70–99)
GLUCOSE BLDC GLUCOMTR-MCNC: 194 MG/DL — HIGH (ref 70–99)
GLUCOSE BLDC GLUCOMTR-MCNC: 195 MG/DL — HIGH (ref 70–99)
GLUCOSE BLDC GLUCOMTR-MCNC: 196 MG/DL — HIGH (ref 70–99)
GLUCOSE BLDC GLUCOMTR-MCNC: 197 MG/DL — HIGH (ref 70–99)
GLUCOSE BLDC GLUCOMTR-MCNC: 198 MG/DL — HIGH (ref 70–99)
GLUCOSE BLDC GLUCOMTR-MCNC: 198 MG/DL — HIGH (ref 70–99)
GLUCOSE BLDC GLUCOMTR-MCNC: 199 MG/DL — HIGH (ref 70–99)
GLUCOSE BLDC GLUCOMTR-MCNC: 200 MG/DL — HIGH (ref 70–99)
GLUCOSE BLDC GLUCOMTR-MCNC: 201 MG/DL — HIGH (ref 70–99)
GLUCOSE BLDC GLUCOMTR-MCNC: 202 MG/DL — HIGH (ref 70–99)
GLUCOSE BLDC GLUCOMTR-MCNC: 203 MG/DL — HIGH (ref 70–99)
GLUCOSE BLDC GLUCOMTR-MCNC: 204 MG/DL — HIGH (ref 70–99)
GLUCOSE BLDC GLUCOMTR-MCNC: 205 MG/DL — HIGH (ref 70–99)
GLUCOSE BLDC GLUCOMTR-MCNC: 206 MG/DL — HIGH (ref 70–99)
GLUCOSE BLDC GLUCOMTR-MCNC: 207 MG/DL — HIGH (ref 70–99)
GLUCOSE BLDC GLUCOMTR-MCNC: 208 MG/DL — HIGH (ref 70–99)
GLUCOSE BLDC GLUCOMTR-MCNC: 209 MG/DL — HIGH (ref 70–99)
GLUCOSE BLDC GLUCOMTR-MCNC: 210 MG/DL — HIGH (ref 70–99)
GLUCOSE BLDC GLUCOMTR-MCNC: 210 MG/DL — HIGH (ref 70–99)
GLUCOSE BLDC GLUCOMTR-MCNC: 211 MG/DL — HIGH (ref 70–99)
GLUCOSE BLDC GLUCOMTR-MCNC: 211 MG/DL — HIGH (ref 70–99)
GLUCOSE BLDC GLUCOMTR-MCNC: 213 MG/DL — HIGH (ref 70–99)
GLUCOSE BLDC GLUCOMTR-MCNC: 214 MG/DL — HIGH (ref 70–99)
GLUCOSE BLDC GLUCOMTR-MCNC: 215 MG/DL — HIGH (ref 70–99)
GLUCOSE BLDC GLUCOMTR-MCNC: 216 MG/DL — HIGH (ref 70–99)
GLUCOSE BLDC GLUCOMTR-MCNC: 217 MG/DL — HIGH (ref 70–99)
GLUCOSE BLDC GLUCOMTR-MCNC: 218 MG/DL — HIGH (ref 70–99)
GLUCOSE BLDC GLUCOMTR-MCNC: 218 MG/DL — HIGH (ref 70–99)
GLUCOSE BLDC GLUCOMTR-MCNC: 219 MG/DL — HIGH (ref 70–99)
GLUCOSE BLDC GLUCOMTR-MCNC: 220 MG/DL — HIGH (ref 70–99)
GLUCOSE BLDC GLUCOMTR-MCNC: 221 MG/DL — HIGH (ref 70–99)
GLUCOSE BLDC GLUCOMTR-MCNC: 222 MG/DL — HIGH (ref 70–99)
GLUCOSE BLDC GLUCOMTR-MCNC: 223 MG/DL — HIGH (ref 70–99)
GLUCOSE BLDC GLUCOMTR-MCNC: 223 MG/DL — HIGH (ref 70–99)
GLUCOSE BLDC GLUCOMTR-MCNC: 225 MG/DL — HIGH (ref 70–99)
GLUCOSE BLDC GLUCOMTR-MCNC: 225 MG/DL — HIGH (ref 70–99)
GLUCOSE BLDC GLUCOMTR-MCNC: 226 MG/DL — HIGH (ref 70–99)
GLUCOSE BLDC GLUCOMTR-MCNC: 227 MG/DL — HIGH (ref 70–99)
GLUCOSE BLDC GLUCOMTR-MCNC: 228 MG/DL — HIGH (ref 70–99)
GLUCOSE BLDC GLUCOMTR-MCNC: 229 MG/DL — HIGH (ref 70–99)
GLUCOSE BLDC GLUCOMTR-MCNC: 230 MG/DL — HIGH (ref 70–99)
GLUCOSE BLDC GLUCOMTR-MCNC: 231 MG/DL — HIGH (ref 70–99)
GLUCOSE BLDC GLUCOMTR-MCNC: 232 MG/DL — HIGH (ref 70–99)
GLUCOSE BLDC GLUCOMTR-MCNC: 233 MG/DL — HIGH (ref 70–99)
GLUCOSE BLDC GLUCOMTR-MCNC: 234 MG/DL — HIGH (ref 70–99)
GLUCOSE BLDC GLUCOMTR-MCNC: 235 MG/DL — HIGH (ref 70–99)
GLUCOSE BLDC GLUCOMTR-MCNC: 236 MG/DL — HIGH (ref 70–99)
GLUCOSE BLDC GLUCOMTR-MCNC: 237 MG/DL — HIGH (ref 70–99)
GLUCOSE BLDC GLUCOMTR-MCNC: 237 MG/DL — HIGH (ref 70–99)
GLUCOSE BLDC GLUCOMTR-MCNC: 238 MG/DL — HIGH (ref 70–99)
GLUCOSE BLDC GLUCOMTR-MCNC: 238 MG/DL — HIGH (ref 70–99)
GLUCOSE BLDC GLUCOMTR-MCNC: 239 MG/DL — HIGH (ref 70–99)
GLUCOSE BLDC GLUCOMTR-MCNC: 240 MG/DL — HIGH (ref 70–99)
GLUCOSE BLDC GLUCOMTR-MCNC: 241 MG/DL — HIGH (ref 70–99)
GLUCOSE BLDC GLUCOMTR-MCNC: 242 MG/DL — HIGH (ref 70–99)
GLUCOSE BLDC GLUCOMTR-MCNC: 243 MG/DL — HIGH (ref 70–99)
GLUCOSE BLDC GLUCOMTR-MCNC: 244 MG/DL — HIGH (ref 70–99)
GLUCOSE BLDC GLUCOMTR-MCNC: 245 MG/DL — HIGH (ref 70–99)
GLUCOSE BLDC GLUCOMTR-MCNC: 246 MG/DL — HIGH (ref 70–99)
GLUCOSE BLDC GLUCOMTR-MCNC: 247 MG/DL — HIGH (ref 70–99)
GLUCOSE BLDC GLUCOMTR-MCNC: 248 MG/DL — HIGH (ref 70–99)
GLUCOSE BLDC GLUCOMTR-MCNC: 248 MG/DL — HIGH (ref 70–99)
GLUCOSE BLDC GLUCOMTR-MCNC: 249 MG/DL — HIGH (ref 70–99)
GLUCOSE BLDC GLUCOMTR-MCNC: 249 MG/DL — HIGH (ref 70–99)
GLUCOSE BLDC GLUCOMTR-MCNC: 250 MG/DL — HIGH (ref 70–99)
GLUCOSE BLDC GLUCOMTR-MCNC: 251 MG/DL — HIGH (ref 70–99)
GLUCOSE BLDC GLUCOMTR-MCNC: 251 MG/DL — HIGH (ref 70–99)
GLUCOSE BLDC GLUCOMTR-MCNC: 252 MG/DL — HIGH (ref 70–99)
GLUCOSE BLDC GLUCOMTR-MCNC: 253 MG/DL — HIGH (ref 70–99)
GLUCOSE BLDC GLUCOMTR-MCNC: 254 MG/DL — HIGH (ref 70–99)
GLUCOSE BLDC GLUCOMTR-MCNC: 255 MG/DL — HIGH (ref 70–99)
GLUCOSE BLDC GLUCOMTR-MCNC: 256 MG/DL — HIGH (ref 70–99)
GLUCOSE BLDC GLUCOMTR-MCNC: 256 MG/DL — HIGH (ref 70–99)
GLUCOSE BLDC GLUCOMTR-MCNC: 257 MG/DL — HIGH (ref 70–99)
GLUCOSE BLDC GLUCOMTR-MCNC: 258 MG/DL — HIGH (ref 70–99)
GLUCOSE BLDC GLUCOMTR-MCNC: 259 MG/DL — HIGH (ref 70–99)
GLUCOSE BLDC GLUCOMTR-MCNC: 260 MG/DL — HIGH (ref 70–99)
GLUCOSE BLDC GLUCOMTR-MCNC: 260 MG/DL — HIGH (ref 70–99)
GLUCOSE BLDC GLUCOMTR-MCNC: 261 MG/DL — HIGH (ref 70–99)
GLUCOSE BLDC GLUCOMTR-MCNC: 261 MG/DL — HIGH (ref 70–99)
GLUCOSE BLDC GLUCOMTR-MCNC: 262 MG/DL — HIGH (ref 70–99)
GLUCOSE BLDC GLUCOMTR-MCNC: 263 MG/DL — HIGH (ref 70–99)
GLUCOSE BLDC GLUCOMTR-MCNC: 263 MG/DL — HIGH (ref 70–99)
GLUCOSE BLDC GLUCOMTR-MCNC: 264 MG/DL — HIGH (ref 70–99)
GLUCOSE BLDC GLUCOMTR-MCNC: 266 MG/DL — HIGH (ref 70–99)
GLUCOSE BLDC GLUCOMTR-MCNC: 268 MG/DL — HIGH (ref 70–99)
GLUCOSE BLDC GLUCOMTR-MCNC: 268 MG/DL — HIGH (ref 70–99)
GLUCOSE BLDC GLUCOMTR-MCNC: 269 MG/DL — HIGH (ref 70–99)
GLUCOSE BLDC GLUCOMTR-MCNC: 269 MG/DL — HIGH (ref 70–99)
GLUCOSE BLDC GLUCOMTR-MCNC: 270 MG/DL — HIGH (ref 70–99)
GLUCOSE BLDC GLUCOMTR-MCNC: 271 MG/DL — HIGH (ref 70–99)
GLUCOSE BLDC GLUCOMTR-MCNC: 272 MG/DL — HIGH (ref 70–99)
GLUCOSE BLDC GLUCOMTR-MCNC: 273 MG/DL — HIGH (ref 70–99)
GLUCOSE BLDC GLUCOMTR-MCNC: 275 MG/DL — HIGH (ref 70–99)
GLUCOSE BLDC GLUCOMTR-MCNC: 275 MG/DL — HIGH (ref 70–99)
GLUCOSE BLDC GLUCOMTR-MCNC: 276 MG/DL — HIGH (ref 70–99)
GLUCOSE BLDC GLUCOMTR-MCNC: 276 MG/DL — HIGH (ref 70–99)
GLUCOSE BLDC GLUCOMTR-MCNC: 277 MG/DL — HIGH (ref 70–99)
GLUCOSE BLDC GLUCOMTR-MCNC: 278 MG/DL — HIGH (ref 70–99)
GLUCOSE BLDC GLUCOMTR-MCNC: 279 MG/DL — HIGH (ref 70–99)
GLUCOSE BLDC GLUCOMTR-MCNC: 279 MG/DL — HIGH (ref 70–99)
GLUCOSE BLDC GLUCOMTR-MCNC: 280 MG/DL — HIGH (ref 70–99)
GLUCOSE BLDC GLUCOMTR-MCNC: 285 MG/DL — HIGH (ref 70–99)
GLUCOSE BLDC GLUCOMTR-MCNC: 287 MG/DL — HIGH (ref 70–99)
GLUCOSE BLDC GLUCOMTR-MCNC: 287 MG/DL — HIGH (ref 70–99)
GLUCOSE BLDC GLUCOMTR-MCNC: 288 MG/DL — HIGH (ref 70–99)
GLUCOSE BLDC GLUCOMTR-MCNC: 288 MG/DL — HIGH (ref 70–99)
GLUCOSE BLDC GLUCOMTR-MCNC: 289 MG/DL — HIGH (ref 70–99)
GLUCOSE BLDC GLUCOMTR-MCNC: 290 MG/DL — HIGH (ref 70–99)
GLUCOSE BLDC GLUCOMTR-MCNC: 290 MG/DL — HIGH (ref 70–99)
GLUCOSE BLDC GLUCOMTR-MCNC: 291 MG/DL — HIGH (ref 70–99)
GLUCOSE BLDC GLUCOMTR-MCNC: 291 MG/DL — HIGH (ref 70–99)
GLUCOSE BLDC GLUCOMTR-MCNC: 293 MG/DL — HIGH (ref 70–99)
GLUCOSE BLDC GLUCOMTR-MCNC: 296 MG/DL — HIGH (ref 70–99)
GLUCOSE BLDC GLUCOMTR-MCNC: 298 MG/DL — HIGH (ref 70–99)
GLUCOSE BLDC GLUCOMTR-MCNC: 298 MG/DL — HIGH (ref 70–99)
GLUCOSE BLDC GLUCOMTR-MCNC: 299 MG/DL — HIGH (ref 70–99)
GLUCOSE BLDC GLUCOMTR-MCNC: 301 MG/DL — HIGH (ref 70–99)
GLUCOSE BLDC GLUCOMTR-MCNC: 301 MG/DL — HIGH (ref 70–99)
GLUCOSE BLDC GLUCOMTR-MCNC: 304 MG/DL — HIGH (ref 70–99)
GLUCOSE BLDC GLUCOMTR-MCNC: 308 MG/DL — HIGH (ref 70–99)
GLUCOSE BLDC GLUCOMTR-MCNC: 313 MG/DL — HIGH (ref 70–99)
GLUCOSE BLDC GLUCOMTR-MCNC: 315 MG/DL — HIGH (ref 70–99)
GLUCOSE BLDC GLUCOMTR-MCNC: 317 MG/DL — HIGH (ref 70–99)
GLUCOSE BLDC GLUCOMTR-MCNC: 319 MG/DL — HIGH (ref 70–99)
GLUCOSE BLDC GLUCOMTR-MCNC: 321 MG/DL — HIGH (ref 70–99)
GLUCOSE BLDC GLUCOMTR-MCNC: 327 MG/DL — HIGH (ref 70–99)
GLUCOSE BLDC GLUCOMTR-MCNC: 327 MG/DL — HIGH (ref 70–99)
GLUCOSE BLDC GLUCOMTR-MCNC: 333 MG/DL — HIGH (ref 70–99)
GLUCOSE BLDC GLUCOMTR-MCNC: 334 MG/DL — HIGH (ref 70–99)
GLUCOSE BLDC GLUCOMTR-MCNC: 336 MG/DL — HIGH (ref 70–99)
GLUCOSE BLDC GLUCOMTR-MCNC: 339 MG/DL — HIGH (ref 70–99)
GLUCOSE BLDC GLUCOMTR-MCNC: 345 MG/DL — HIGH (ref 70–99)
GLUCOSE BLDC GLUCOMTR-MCNC: 354 MG/DL — HIGH (ref 70–99)
GLUCOSE BLDC GLUCOMTR-MCNC: 365 MG/DL — HIGH (ref 70–99)
GLUCOSE BLDC GLUCOMTR-MCNC: 38 MG/DL — CRITICAL LOW (ref 70–99)
GLUCOSE BLDC GLUCOMTR-MCNC: 390 MG/DL — HIGH (ref 70–99)
GLUCOSE BLDC GLUCOMTR-MCNC: 402 MG/DL — HIGH (ref 70–99)
GLUCOSE BLDC GLUCOMTR-MCNC: 424 MG/DL — HIGH (ref 70–99)
GLUCOSE BLDC GLUCOMTR-MCNC: 448 MG/DL — HIGH (ref 70–99)
GLUCOSE BLDC GLUCOMTR-MCNC: 45 MG/DL — CRITICAL LOW (ref 70–99)
GLUCOSE BLDC GLUCOMTR-MCNC: 46 MG/DL — CRITICAL LOW (ref 70–99)
GLUCOSE BLDC GLUCOMTR-MCNC: 466 MG/DL — CRITICAL HIGH (ref 70–99)
GLUCOSE BLDC GLUCOMTR-MCNC: 50 MG/DL — CRITICAL LOW (ref 70–99)
GLUCOSE BLDC GLUCOMTR-MCNC: 506 MG/DL — CRITICAL HIGH (ref 70–99)
GLUCOSE BLDC GLUCOMTR-MCNC: 51 MG/DL — CRITICAL LOW (ref 70–99)
GLUCOSE BLDC GLUCOMTR-MCNC: 52 MG/DL — CRITICAL LOW (ref 70–99)
GLUCOSE BLDC GLUCOMTR-MCNC: 53 MG/DL — CRITICAL LOW (ref 70–99)
GLUCOSE BLDC GLUCOMTR-MCNC: 57 MG/DL — LOW (ref 70–99)
GLUCOSE BLDC GLUCOMTR-MCNC: 61 MG/DL — LOW (ref 70–99)
GLUCOSE BLDC GLUCOMTR-MCNC: 61 MG/DL — LOW (ref 70–99)
GLUCOSE BLDC GLUCOMTR-MCNC: 62 MG/DL — LOW (ref 70–99)
GLUCOSE BLDC GLUCOMTR-MCNC: 66 MG/DL — LOW (ref 70–99)
GLUCOSE BLDC GLUCOMTR-MCNC: 67 MG/DL — LOW (ref 70–99)
GLUCOSE BLDC GLUCOMTR-MCNC: 68 MG/DL — LOW (ref 70–99)
GLUCOSE BLDC GLUCOMTR-MCNC: 69 MG/DL — LOW (ref 70–99)
GLUCOSE BLDC GLUCOMTR-MCNC: 69 MG/DL — LOW (ref 70–99)
GLUCOSE BLDC GLUCOMTR-MCNC: 70 MG/DL — SIGNIFICANT CHANGE UP (ref 70–99)
GLUCOSE BLDC GLUCOMTR-MCNC: 71 MG/DL — SIGNIFICANT CHANGE UP (ref 70–99)
GLUCOSE BLDC GLUCOMTR-MCNC: 72 MG/DL — SIGNIFICANT CHANGE UP (ref 70–99)
GLUCOSE BLDC GLUCOMTR-MCNC: 74 MG/DL — SIGNIFICANT CHANGE UP (ref 70–99)
GLUCOSE BLDC GLUCOMTR-MCNC: 76 MG/DL — SIGNIFICANT CHANGE UP (ref 70–99)
GLUCOSE BLDC GLUCOMTR-MCNC: 77 MG/DL — SIGNIFICANT CHANGE UP (ref 70–99)
GLUCOSE BLDC GLUCOMTR-MCNC: 77 MG/DL — SIGNIFICANT CHANGE UP (ref 70–99)
GLUCOSE BLDC GLUCOMTR-MCNC: 79 MG/DL — SIGNIFICANT CHANGE UP (ref 70–99)
GLUCOSE BLDC GLUCOMTR-MCNC: 79 MG/DL — SIGNIFICANT CHANGE UP (ref 70–99)
GLUCOSE BLDC GLUCOMTR-MCNC: 80 MG/DL — SIGNIFICANT CHANGE UP (ref 70–99)
GLUCOSE BLDC GLUCOMTR-MCNC: 81 MG/DL — SIGNIFICANT CHANGE UP (ref 70–99)
GLUCOSE BLDC GLUCOMTR-MCNC: 81 MG/DL — SIGNIFICANT CHANGE UP (ref 70–99)
GLUCOSE BLDC GLUCOMTR-MCNC: 82 MG/DL — SIGNIFICANT CHANGE UP (ref 70–99)
GLUCOSE BLDC GLUCOMTR-MCNC: 83 MG/DL — SIGNIFICANT CHANGE UP (ref 70–99)
GLUCOSE BLDC GLUCOMTR-MCNC: 83 MG/DL — SIGNIFICANT CHANGE UP (ref 70–99)
GLUCOSE BLDC GLUCOMTR-MCNC: 84 MG/DL — SIGNIFICANT CHANGE UP (ref 70–99)
GLUCOSE BLDC GLUCOMTR-MCNC: 84 MG/DL — SIGNIFICANT CHANGE UP (ref 70–99)
GLUCOSE BLDC GLUCOMTR-MCNC: 85 MG/DL — SIGNIFICANT CHANGE UP (ref 70–99)
GLUCOSE BLDC GLUCOMTR-MCNC: 85 MG/DL — SIGNIFICANT CHANGE UP (ref 70–99)
GLUCOSE BLDC GLUCOMTR-MCNC: 86 MG/DL — SIGNIFICANT CHANGE UP (ref 70–99)
GLUCOSE BLDC GLUCOMTR-MCNC: 90 MG/DL — SIGNIFICANT CHANGE UP (ref 70–99)
GLUCOSE BLDC GLUCOMTR-MCNC: 91 MG/DL — SIGNIFICANT CHANGE UP (ref 70–99)
GLUCOSE BLDC GLUCOMTR-MCNC: 92 MG/DL — SIGNIFICANT CHANGE UP (ref 70–99)
GLUCOSE BLDC GLUCOMTR-MCNC: 93 MG/DL — SIGNIFICANT CHANGE UP (ref 70–99)
GLUCOSE BLDC GLUCOMTR-MCNC: 94 MG/DL — SIGNIFICANT CHANGE UP (ref 70–99)
GLUCOSE BLDC GLUCOMTR-MCNC: 94 MG/DL — SIGNIFICANT CHANGE UP (ref 70–99)
GLUCOSE BLDC GLUCOMTR-MCNC: 95 MG/DL — SIGNIFICANT CHANGE UP (ref 70–99)
GLUCOSE BLDC GLUCOMTR-MCNC: 95 MG/DL — SIGNIFICANT CHANGE UP (ref 70–99)
GLUCOSE BLDC GLUCOMTR-MCNC: 96 MG/DL — SIGNIFICANT CHANGE UP (ref 70–99)
GLUCOSE BLDC GLUCOMTR-MCNC: 96 MG/DL — SIGNIFICANT CHANGE UP (ref 70–99)
GLUCOSE BLDC GLUCOMTR-MCNC: 97 MG/DL — SIGNIFICANT CHANGE UP (ref 70–99)
GLUCOSE BLDC GLUCOMTR-MCNC: 97 MG/DL — SIGNIFICANT CHANGE UP (ref 70–99)
GLUCOSE BLDC GLUCOMTR-MCNC: 98 MG/DL — SIGNIFICANT CHANGE UP (ref 70–99)
GLUCOSE BLDC GLUCOMTR-MCNC: 99 MG/DL — SIGNIFICANT CHANGE UP (ref 70–99)
GLUCOSE BLDC GLUCOMTR-MCNC: 99 MG/DL — SIGNIFICANT CHANGE UP (ref 70–99)
GLUCOSE BLDV-MCNC: 117 MG/DL — HIGH (ref 70–99)
GLUCOSE BLDV-MCNC: 132 MG/DL — HIGH (ref 70–99)
GLUCOSE BLDV-MCNC: 158 MG/DL — HIGH (ref 70–99)
GLUCOSE BLDV-MCNC: 171 MG/DL — HIGH (ref 70–99)
GLUCOSE BLDV-MCNC: 181 MG/DL — HIGH (ref 70–99)
GLUCOSE BLDV-MCNC: 212 MG/DL — HIGH (ref 70–99)
GLUCOSE BLDV-MCNC: 243 MG/DL — HIGH (ref 70–99)
GLUCOSE BLDV-MCNC: 243 MG/DL — HIGH (ref 70–99)
GLUCOSE BLDV-MCNC: 300 MG/DL — HIGH (ref 70–99)
GLUCOSE BLDV-MCNC: 321 MG/DL — HIGH (ref 70–99)
GLUCOSE BLDV-MCNC: 330 MG/DL — HIGH (ref 70–99)
GLUCOSE CSF-MCNC: 125 MG/DL — HIGH (ref 40–70)
GLUCOSE FLD-MCNC: 205 MG/DL — SIGNIFICANT CHANGE UP
GLUCOSE QUALITATIVE U: ABNORMAL
GLUCOSE SERPL-MCNC: 100 MG/DL — HIGH (ref 70–99)
GLUCOSE SERPL-MCNC: 102 MG/DL — HIGH (ref 70–99)
GLUCOSE SERPL-MCNC: 102 MG/DL — HIGH (ref 70–99)
GLUCOSE SERPL-MCNC: 105 MG/DL — HIGH (ref 70–99)
GLUCOSE SERPL-MCNC: 106 MG/DL — HIGH (ref 70–99)
GLUCOSE SERPL-MCNC: 107 MG/DL — HIGH (ref 70–99)
GLUCOSE SERPL-MCNC: 108 MG/DL — HIGH (ref 70–99)
GLUCOSE SERPL-MCNC: 111 MG/DL — HIGH (ref 70–99)
GLUCOSE SERPL-MCNC: 112 MG/DL — HIGH (ref 70–99)
GLUCOSE SERPL-MCNC: 114 MG/DL — HIGH (ref 70–99)
GLUCOSE SERPL-MCNC: 114 MG/DL — HIGH (ref 70–99)
GLUCOSE SERPL-MCNC: 115 MG/DL — HIGH (ref 70–99)
GLUCOSE SERPL-MCNC: 115 MG/DL — HIGH (ref 70–99)
GLUCOSE SERPL-MCNC: 116 MG/DL — HIGH (ref 70–99)
GLUCOSE SERPL-MCNC: 116 MG/DL — HIGH (ref 70–99)
GLUCOSE SERPL-MCNC: 117 MG/DL — HIGH (ref 70–99)
GLUCOSE SERPL-MCNC: 120 MG/DL — HIGH (ref 70–99)
GLUCOSE SERPL-MCNC: 121 MG/DL — HIGH (ref 70–99)
GLUCOSE SERPL-MCNC: 121 MG/DL — HIGH (ref 70–99)
GLUCOSE SERPL-MCNC: 122 MG/DL — HIGH (ref 70–99)
GLUCOSE SERPL-MCNC: 123 MG/DL — HIGH (ref 70–99)
GLUCOSE SERPL-MCNC: 123 MG/DL — HIGH (ref 70–99)
GLUCOSE SERPL-MCNC: 124 MG/DL — HIGH (ref 70–99)
GLUCOSE SERPL-MCNC: 124 MG/DL — HIGH (ref 70–99)
GLUCOSE SERPL-MCNC: 126 MG/DL — HIGH (ref 70–99)
GLUCOSE SERPL-MCNC: 128 MG/DL — HIGH (ref 70–99)
GLUCOSE SERPL-MCNC: 130 MG/DL — HIGH (ref 70–99)
GLUCOSE SERPL-MCNC: 130 MG/DL — HIGH (ref 70–99)
GLUCOSE SERPL-MCNC: 131 MG/DL — HIGH (ref 70–99)
GLUCOSE SERPL-MCNC: 132 MG/DL — HIGH (ref 70–99)
GLUCOSE SERPL-MCNC: 132 MG/DL — HIGH (ref 70–99)
GLUCOSE SERPL-MCNC: 133 MG/DL — HIGH (ref 70–99)
GLUCOSE SERPL-MCNC: 134 MG/DL — HIGH (ref 70–99)
GLUCOSE SERPL-MCNC: 139 MG/DL — HIGH (ref 70–99)
GLUCOSE SERPL-MCNC: 139 MG/DL — HIGH (ref 70–99)
GLUCOSE SERPL-MCNC: 142 MG/DL — HIGH (ref 70–99)
GLUCOSE SERPL-MCNC: 143 MG/DL — HIGH (ref 70–99)
GLUCOSE SERPL-MCNC: 143 MG/DL — HIGH (ref 70–99)
GLUCOSE SERPL-MCNC: 144 MG/DL — HIGH (ref 70–99)
GLUCOSE SERPL-MCNC: 144 MG/DL — HIGH (ref 70–99)
GLUCOSE SERPL-MCNC: 145 MG/DL — HIGH (ref 70–99)
GLUCOSE SERPL-MCNC: 146 MG/DL — HIGH (ref 70–99)
GLUCOSE SERPL-MCNC: 147 MG/DL — HIGH (ref 70–99)
GLUCOSE SERPL-MCNC: 148 MG/DL — HIGH (ref 70–99)
GLUCOSE SERPL-MCNC: 148 MG/DL — HIGH (ref 70–99)
GLUCOSE SERPL-MCNC: 149 MG/DL — HIGH (ref 70–99)
GLUCOSE SERPL-MCNC: 152 MG/DL — HIGH (ref 70–99)
GLUCOSE SERPL-MCNC: 153 MG/DL — HIGH (ref 70–99)
GLUCOSE SERPL-MCNC: 154 MG/DL — HIGH (ref 70–99)
GLUCOSE SERPL-MCNC: 156 MG/DL — HIGH (ref 70–99)
GLUCOSE SERPL-MCNC: 158 MG/DL — HIGH (ref 70–99)
GLUCOSE SERPL-MCNC: 159 MG/DL — HIGH (ref 70–99)
GLUCOSE SERPL-MCNC: 160 MG/DL — HIGH (ref 70–99)
GLUCOSE SERPL-MCNC: 161 MG/DL — HIGH (ref 70–99)
GLUCOSE SERPL-MCNC: 162 MG/DL — HIGH (ref 70–99)
GLUCOSE SERPL-MCNC: 163 MG/DL — HIGH (ref 70–99)
GLUCOSE SERPL-MCNC: 165 MG/DL — HIGH (ref 70–99)
GLUCOSE SERPL-MCNC: 165 MG/DL — HIGH (ref 70–99)
GLUCOSE SERPL-MCNC: 166 MG/DL — HIGH (ref 70–99)
GLUCOSE SERPL-MCNC: 166 MG/DL — HIGH (ref 70–99)
GLUCOSE SERPL-MCNC: 167 MG/DL — HIGH (ref 70–99)
GLUCOSE SERPL-MCNC: 168 MG/DL — HIGH (ref 70–99)
GLUCOSE SERPL-MCNC: 169 MG/DL
GLUCOSE SERPL-MCNC: 169 MG/DL — HIGH (ref 70–99)
GLUCOSE SERPL-MCNC: 172 MG/DL — HIGH (ref 70–99)
GLUCOSE SERPL-MCNC: 173 MG/DL — HIGH (ref 70–99)
GLUCOSE SERPL-MCNC: 174 MG/DL — HIGH (ref 70–99)
GLUCOSE SERPL-MCNC: 174 MG/DL — HIGH (ref 70–99)
GLUCOSE SERPL-MCNC: 175 MG/DL — HIGH (ref 70–99)
GLUCOSE SERPL-MCNC: 176 MG/DL — HIGH (ref 70–99)
GLUCOSE SERPL-MCNC: 177 MG/DL — HIGH (ref 70–99)
GLUCOSE SERPL-MCNC: 177 MG/DL — HIGH (ref 70–99)
GLUCOSE SERPL-MCNC: 178 MG/DL — HIGH (ref 70–99)
GLUCOSE SERPL-MCNC: 180 MG/DL — HIGH (ref 70–99)
GLUCOSE SERPL-MCNC: 180 MG/DL — HIGH (ref 70–99)
GLUCOSE SERPL-MCNC: 181 MG/DL — HIGH (ref 70–99)
GLUCOSE SERPL-MCNC: 181 MG/DL — HIGH (ref 70–99)
GLUCOSE SERPL-MCNC: 182 MG/DL — HIGH (ref 70–99)
GLUCOSE SERPL-MCNC: 182 MG/DL — HIGH (ref 70–99)
GLUCOSE SERPL-MCNC: 183 MG/DL — HIGH (ref 70–99)
GLUCOSE SERPL-MCNC: 183 MG/DL — HIGH (ref 70–99)
GLUCOSE SERPL-MCNC: 184 MG/DL — HIGH (ref 70–99)
GLUCOSE SERPL-MCNC: 184 MG/DL — HIGH (ref 70–99)
GLUCOSE SERPL-MCNC: 185 MG/DL — HIGH (ref 70–99)
GLUCOSE SERPL-MCNC: 186 MG/DL — HIGH (ref 70–99)
GLUCOSE SERPL-MCNC: 187 MG/DL — HIGH (ref 70–99)
GLUCOSE SERPL-MCNC: 188 MG/DL — HIGH (ref 70–99)
GLUCOSE SERPL-MCNC: 189 MG/DL — HIGH (ref 70–99)
GLUCOSE SERPL-MCNC: 190 MG/DL — HIGH (ref 70–99)
GLUCOSE SERPL-MCNC: 193 MG/DL — HIGH (ref 70–99)
GLUCOSE SERPL-MCNC: 193 MG/DL — HIGH (ref 70–99)
GLUCOSE SERPL-MCNC: 194 MG/DL — HIGH (ref 70–99)
GLUCOSE SERPL-MCNC: 195 MG/DL — HIGH (ref 70–99)
GLUCOSE SERPL-MCNC: 196 MG/DL — HIGH (ref 70–99)
GLUCOSE SERPL-MCNC: 197 MG/DL — HIGH (ref 70–99)
GLUCOSE SERPL-MCNC: 198 MG/DL — HIGH (ref 70–99)
GLUCOSE SERPL-MCNC: 199 MG/DL — HIGH (ref 70–99)
GLUCOSE SERPL-MCNC: 199 MG/DL — HIGH (ref 70–99)
GLUCOSE SERPL-MCNC: 200 MG/DL — HIGH (ref 70–99)
GLUCOSE SERPL-MCNC: 200 MG/DL — HIGH (ref 70–99)
GLUCOSE SERPL-MCNC: 201 MG/DL
GLUCOSE SERPL-MCNC: 201 MG/DL — HIGH (ref 70–99)
GLUCOSE SERPL-MCNC: 201 MG/DL — HIGH (ref 70–99)
GLUCOSE SERPL-MCNC: 203 MG/DL — HIGH (ref 70–99)
GLUCOSE SERPL-MCNC: 205 MG/DL — HIGH (ref 70–99)
GLUCOSE SERPL-MCNC: 206 MG/DL — HIGH (ref 70–99)
GLUCOSE SERPL-MCNC: 207 MG/DL — HIGH (ref 70–99)
GLUCOSE SERPL-MCNC: 209 MG/DL — HIGH (ref 70–99)
GLUCOSE SERPL-MCNC: 210 MG/DL — HIGH (ref 70–99)
GLUCOSE SERPL-MCNC: 213 MG/DL — HIGH (ref 70–99)
GLUCOSE SERPL-MCNC: 214 MG/DL — HIGH (ref 70–99)
GLUCOSE SERPL-MCNC: 215 MG/DL — HIGH (ref 70–99)
GLUCOSE SERPL-MCNC: 216 MG/DL
GLUCOSE SERPL-MCNC: 220 MG/DL — HIGH (ref 70–99)
GLUCOSE SERPL-MCNC: 226 MG/DL — HIGH (ref 70–99)
GLUCOSE SERPL-MCNC: 227 MG/DL — HIGH (ref 70–99)
GLUCOSE SERPL-MCNC: 229 MG/DL — HIGH (ref 70–99)
GLUCOSE SERPL-MCNC: 229 MG/DL — HIGH (ref 70–99)
GLUCOSE SERPL-MCNC: 232 MG/DL — HIGH (ref 70–99)
GLUCOSE SERPL-MCNC: 232 MG/DL — HIGH (ref 70–99)
GLUCOSE SERPL-MCNC: 233 MG/DL — HIGH (ref 70–99)
GLUCOSE SERPL-MCNC: 235 MG/DL — HIGH (ref 70–99)
GLUCOSE SERPL-MCNC: 244 MG/DL — HIGH (ref 70–99)
GLUCOSE SERPL-MCNC: 244 MG/DL — HIGH (ref 70–99)
GLUCOSE SERPL-MCNC: 245 MG/DL — HIGH (ref 70–99)
GLUCOSE SERPL-MCNC: 247 MG/DL — HIGH (ref 70–99)
GLUCOSE SERPL-MCNC: 248 MG/DL — HIGH (ref 70–99)
GLUCOSE SERPL-MCNC: 251 MG/DL — HIGH (ref 70–99)
GLUCOSE SERPL-MCNC: 259 MG/DL — HIGH (ref 70–99)
GLUCOSE SERPL-MCNC: 259 MG/DL — HIGH (ref 70–99)
GLUCOSE SERPL-MCNC: 260 MG/DL — HIGH (ref 70–99)
GLUCOSE SERPL-MCNC: 262 MG/DL — HIGH (ref 70–99)
GLUCOSE SERPL-MCNC: 265 MG/DL — HIGH (ref 70–99)
GLUCOSE SERPL-MCNC: 273 MG/DL — HIGH (ref 70–99)
GLUCOSE SERPL-MCNC: 275 MG/DL — HIGH (ref 70–99)
GLUCOSE SERPL-MCNC: 276 MG/DL — HIGH (ref 70–99)
GLUCOSE SERPL-MCNC: 284 MG/DL — HIGH (ref 70–99)
GLUCOSE SERPL-MCNC: 286 MG/DL — HIGH (ref 70–99)
GLUCOSE SERPL-MCNC: 297 MG/DL — HIGH (ref 70–99)
GLUCOSE SERPL-MCNC: 298 MG/DL — HIGH (ref 70–99)
GLUCOSE SERPL-MCNC: 299 MG/DL — HIGH (ref 70–99)
GLUCOSE SERPL-MCNC: 302 MG/DL — HIGH (ref 70–99)
GLUCOSE SERPL-MCNC: 304 MG/DL — HIGH (ref 70–99)
GLUCOSE SERPL-MCNC: 308 MG/DL — HIGH (ref 70–99)
GLUCOSE SERPL-MCNC: 313 MG/DL — HIGH (ref 70–99)
GLUCOSE SERPL-MCNC: 327 MG/DL — HIGH (ref 70–99)
GLUCOSE SERPL-MCNC: 334 MG/DL — HIGH (ref 70–99)
GLUCOSE SERPL-MCNC: 46 MG/DL — CRITICAL LOW (ref 70–99)
GLUCOSE SERPL-MCNC: 51 MG/DL — CRITICAL LOW (ref 70–99)
GLUCOSE SERPL-MCNC: 52 MG/DL — CRITICAL LOW (ref 70–99)
GLUCOSE SERPL-MCNC: 56 MG/DL — LOW (ref 70–99)
GLUCOSE SERPL-MCNC: 61 MG/DL — LOW (ref 70–99)
GLUCOSE SERPL-MCNC: 70 MG/DL — SIGNIFICANT CHANGE UP (ref 70–99)
GLUCOSE SERPL-MCNC: 74 MG/DL — SIGNIFICANT CHANGE UP (ref 70–99)
GLUCOSE SERPL-MCNC: 77 MG/DL — SIGNIFICANT CHANGE UP (ref 70–99)
GLUCOSE SERPL-MCNC: 77 MG/DL — SIGNIFICANT CHANGE UP (ref 70–99)
GLUCOSE SERPL-MCNC: 78 MG/DL — SIGNIFICANT CHANGE UP (ref 70–99)
GLUCOSE SERPL-MCNC: 78 MG/DL — SIGNIFICANT CHANGE UP (ref 70–99)
GLUCOSE SERPL-MCNC: 79 MG/DL — SIGNIFICANT CHANGE UP (ref 70–99)
GLUCOSE SERPL-MCNC: 82 MG/DL — SIGNIFICANT CHANGE UP (ref 70–99)
GLUCOSE SERPL-MCNC: 83 MG/DL — SIGNIFICANT CHANGE UP (ref 70–99)
GLUCOSE SERPL-MCNC: 84 MG/DL — SIGNIFICANT CHANGE UP (ref 70–99)
GLUCOSE SERPL-MCNC: 88 MG/DL — SIGNIFICANT CHANGE UP (ref 70–99)
GLUCOSE SERPL-MCNC: 90 MG/DL — SIGNIFICANT CHANGE UP (ref 70–99)
GLUCOSE SERPL-MCNC: 91 MG/DL — SIGNIFICANT CHANGE UP (ref 70–99)
GLUCOSE SERPL-MCNC: 93 MG/DL — SIGNIFICANT CHANGE UP (ref 70–99)
GLUCOSE SERPL-MCNC: 94 MG/DL — SIGNIFICANT CHANGE UP (ref 70–99)
GLUCOSE SERPL-MCNC: 94 MG/DL — SIGNIFICANT CHANGE UP (ref 70–99)
GLUCOSE SERPL-MCNC: 95 MG/DL — SIGNIFICANT CHANGE UP (ref 70–99)
GLUCOSE SERPL-MCNC: 95 MG/DL — SIGNIFICANT CHANGE UP (ref 70–99)
GLUCOSE SERPL-MCNC: 96 MG/DL — SIGNIFICANT CHANGE UP (ref 70–99)
GLUCOSE SERPL-MCNC: 98 MG/DL — SIGNIFICANT CHANGE UP (ref 70–99)
GLUCOSE SERPL-MCNC: 98 MG/DL — SIGNIFICANT CHANGE UP (ref 70–99)
GLUCOSE SERPL-MCNC: 99 MG/DL — SIGNIFICANT CHANGE UP (ref 70–99)
GLUCOSE SERPL-MCNC: 99 MG/DL — SIGNIFICANT CHANGE UP (ref 70–99)
GLUCOSE UR QL: ABNORMAL
GLUCOSE UR QL: NEGATIVE — SIGNIFICANT CHANGE UP
GRAM STN FLD: SIGNIFICANT CHANGE UP
HADV AB SER-ACNC: NEGATIVE — SIGNIFICANT CHANGE UP
HADV DNA FLD NAA+PROBE-LOG#: SIGNIFICANT CHANGE UP COPIES/ML
HADV DNA FLD NAA+PROBE-LOG#: SIGNIFICANT CHANGE UP COPIES/ML
HADV DNA SPEC QL NAA+PROBE: SIGNIFICANT CHANGE UP
HAPTOGLOB SERPL-MCNC: 123 MG/DL — SIGNIFICANT CHANGE UP (ref 34–200)
HAPTOGLOB SERPL-MCNC: 313 MG/DL — HIGH (ref 34–200)
HAPTOGLOB SERPL-MCNC: 79 MG/DL — SIGNIFICANT CHANGE UP (ref 34–200)
HAPTOGLOB SERPL-MCNC: <20 MG/DL — LOW (ref 34–200)
HAV IGM SER-ACNC: SIGNIFICANT CHANGE UP
HAV IGM SER-ACNC: SIGNIFICANT CHANGE UP
HBV CORE AB SER-ACNC: REACTIVE
HBV CORE IGM SER-ACNC: SIGNIFICANT CHANGE UP
HBV CORE IGM SER-ACNC: SIGNIFICANT CHANGE UP
HBV DNA # SERPL NAA+PROBE: SIGNIFICANT CHANGE UP
HBV DNA SERPL NAA+PROBE-LOG#: SIGNIFICANT CHANGE UP LOGIU/ML
HBV SURFACE AB SER-ACNC: SIGNIFICANT CHANGE UP MIU/ML
HBV SURFACE AG SER QL: NONREACTIVE
HBV SURFACE AG SER-ACNC: SIGNIFICANT CHANGE UP
HCO3 BLDV-SCNC: 16 MMOL/L — LOW (ref 22–29)
HCO3 BLDV-SCNC: 24 MMOL/L — SIGNIFICANT CHANGE UP (ref 22–29)
HCO3 BLDV-SCNC: 25 MMOL/L — SIGNIFICANT CHANGE UP (ref 22–29)
HCO3 BLDV-SCNC: 25 MMOL/L — SIGNIFICANT CHANGE UP (ref 22–29)
HCO3 BLDV-SCNC: 26 MMOL/L — SIGNIFICANT CHANGE UP (ref 22–29)
HCO3 BLDV-SCNC: 28 MMOL/L — SIGNIFICANT CHANGE UP (ref 22–29)
HCO3 BLDV-SCNC: 29 MMOL/L — SIGNIFICANT CHANGE UP (ref 22–29)
HCO3 BLDV-SCNC: 30 MMOL/L — HIGH (ref 22–29)
HCOV 229E RNA SPEC QL NAA+PROBE: SIGNIFICANT CHANGE UP
HCOV HKU1 RNA SPEC QL NAA+PROBE: SIGNIFICANT CHANGE UP
HCOV NL63 RNA SPEC QL NAA+PROBE: SIGNIFICANT CHANGE UP
HCOV OC43 RNA SPEC QL NAA+PROBE: SIGNIFICANT CHANGE UP
HCT VFR BLD CALC: 17.5 % — CRITICAL LOW (ref 39–50)
HCT VFR BLD CALC: 17.5 % — CRITICAL LOW (ref 39–50)
HCT VFR BLD CALC: 18.6 % — CRITICAL LOW (ref 39–50)
HCT VFR BLD CALC: 19.8 % — CRITICAL LOW (ref 39–50)
HCT VFR BLD CALC: 20 % — CRITICAL LOW (ref 39–50)
HCT VFR BLD CALC: 20.4 % — CRITICAL LOW (ref 39–50)
HCT VFR BLD CALC: 20.7 % — CRITICAL LOW (ref 39–50)
HCT VFR BLD CALC: 20.8 % — CRITICAL LOW (ref 39–50)
HCT VFR BLD CALC: 21.1 % — LOW (ref 39–50)
HCT VFR BLD CALC: 21.4 % — LOW (ref 39–50)
HCT VFR BLD CALC: 21.8 % — LOW (ref 39–50)
HCT VFR BLD CALC: 22 % — LOW (ref 39–50)
HCT VFR BLD CALC: 22.1 % — LOW (ref 39–50)
HCT VFR BLD CALC: 22.2 % — LOW (ref 39–50)
HCT VFR BLD CALC: 22.3 % — LOW (ref 39–50)
HCT VFR BLD CALC: 22.4 % — LOW (ref 39–50)
HCT VFR BLD CALC: 22.5 % — LOW (ref 39–50)
HCT VFR BLD CALC: 22.6 % — LOW (ref 39–50)
HCT VFR BLD CALC: 22.7 % — LOW (ref 39–50)
HCT VFR BLD CALC: 22.8 % — LOW (ref 39–50)
HCT VFR BLD CALC: 23 % — LOW (ref 39–50)
HCT VFR BLD CALC: 23 % — LOW (ref 39–50)
HCT VFR BLD CALC: 23.3 % — LOW (ref 39–50)
HCT VFR BLD CALC: 23.4 % — LOW (ref 39–50)
HCT VFR BLD CALC: 23.4 % — LOW (ref 39–50)
HCT VFR BLD CALC: 23.5 % — LOW (ref 39–50)
HCT VFR BLD CALC: 23.5 % — LOW (ref 39–50)
HCT VFR BLD CALC: 23.6 % — LOW (ref 39–50)
HCT VFR BLD CALC: 23.6 % — LOW (ref 39–50)
HCT VFR BLD CALC: 23.7 % — LOW (ref 39–50)
HCT VFR BLD CALC: 23.8 % — LOW (ref 39–50)
HCT VFR BLD CALC: 23.8 % — LOW (ref 39–50)
HCT VFR BLD CALC: 24 % — LOW (ref 39–50)
HCT VFR BLD CALC: 24 % — LOW (ref 39–50)
HCT VFR BLD CALC: 24.1 % — LOW (ref 39–50)
HCT VFR BLD CALC: 24.2 % — LOW (ref 39–50)
HCT VFR BLD CALC: 24.3 % — LOW (ref 39–50)
HCT VFR BLD CALC: 24.3 % — LOW (ref 39–50)
HCT VFR BLD CALC: 24.4 % — LOW (ref 39–50)
HCT VFR BLD CALC: 24.5 % — LOW (ref 39–50)
HCT VFR BLD CALC: 24.6 % — LOW (ref 39–50)
HCT VFR BLD CALC: 24.6 % — LOW (ref 39–50)
HCT VFR BLD CALC: 24.7 % — LOW (ref 39–50)
HCT VFR BLD CALC: 24.8 % — LOW (ref 39–50)
HCT VFR BLD CALC: 24.8 % — LOW (ref 39–50)
HCT VFR BLD CALC: 24.9 % — LOW (ref 39–50)
HCT VFR BLD CALC: 25 % — LOW (ref 39–50)
HCT VFR BLD CALC: 25 % — LOW (ref 39–50)
HCT VFR BLD CALC: 25.1 % — LOW (ref 39–50)
HCT VFR BLD CALC: 25.2 % — LOW (ref 39–50)
HCT VFR BLD CALC: 25.2 % — LOW (ref 39–50)
HCT VFR BLD CALC: 25.3 % — LOW (ref 39–50)
HCT VFR BLD CALC: 25.4 % — LOW (ref 39–50)
HCT VFR BLD CALC: 25.5 % — LOW (ref 39–50)
HCT VFR BLD CALC: 25.5 % — LOW (ref 39–50)
HCT VFR BLD CALC: 25.6 % — LOW (ref 39–50)
HCT VFR BLD CALC: 25.6 % — LOW (ref 39–50)
HCT VFR BLD CALC: 25.7 % — LOW (ref 39–50)
HCT VFR BLD CALC: 25.8 %
HCT VFR BLD CALC: 25.8 % — LOW (ref 39–50)
HCT VFR BLD CALC: 25.9 % — LOW (ref 39–50)
HCT VFR BLD CALC: 25.9 % — LOW (ref 39–50)
HCT VFR BLD CALC: 26 % — LOW (ref 39–50)
HCT VFR BLD CALC: 26 % — LOW (ref 39–50)
HCT VFR BLD CALC: 26.1 % — LOW (ref 39–50)
HCT VFR BLD CALC: 26.2 % — LOW (ref 39–50)
HCT VFR BLD CALC: 26.3 % — LOW (ref 39–50)
HCT VFR BLD CALC: 26.4 % — LOW (ref 39–50)
HCT VFR BLD CALC: 26.5 % — LOW (ref 39–50)
HCT VFR BLD CALC: 26.5 % — LOW (ref 39–50)
HCT VFR BLD CALC: 26.6 % — LOW (ref 39–50)
HCT VFR BLD CALC: 26.7 % — LOW (ref 39–50)
HCT VFR BLD CALC: 26.8 % — LOW (ref 39–50)
HCT VFR BLD CALC: 26.9 % — LOW (ref 39–50)
HCT VFR BLD CALC: 27 % — LOW (ref 39–50)
HCT VFR BLD CALC: 27.1 % — LOW (ref 39–50)
HCT VFR BLD CALC: 27.1 % — LOW (ref 39–50)
HCT VFR BLD CALC: 27.2 % — LOW (ref 39–50)
HCT VFR BLD CALC: 27.2 % — LOW (ref 39–50)
HCT VFR BLD CALC: 27.3 % — LOW (ref 39–50)
HCT VFR BLD CALC: 27.3 % — LOW (ref 39–50)
HCT VFR BLD CALC: 27.4 % — LOW (ref 39–50)
HCT VFR BLD CALC: 27.5 % — LOW (ref 39–50)
HCT VFR BLD CALC: 27.5 % — LOW (ref 39–50)
HCT VFR BLD CALC: 27.6 % — LOW (ref 39–50)
HCT VFR BLD CALC: 27.7 % — LOW (ref 39–50)
HCT VFR BLD CALC: 27.8 % — LOW (ref 39–50)
HCT VFR BLD CALC: 27.8 % — LOW (ref 39–50)
HCT VFR BLD CALC: 27.9 % — LOW (ref 39–50)
HCT VFR BLD CALC: 28 % — LOW (ref 39–50)
HCT VFR BLD CALC: 28.1 % — LOW (ref 39–50)
HCT VFR BLD CALC: 28.2 % — LOW (ref 39–50)
HCT VFR BLD CALC: 28.2 % — LOW (ref 39–50)
HCT VFR BLD CALC: 28.4 % — LOW (ref 39–50)
HCT VFR BLD CALC: 28.4 % — LOW (ref 39–50)
HCT VFR BLD CALC: 28.5 % — LOW (ref 39–50)
HCT VFR BLD CALC: 28.6 % — LOW (ref 39–50)
HCT VFR BLD CALC: 28.7 % — LOW (ref 39–50)
HCT VFR BLD CALC: 28.7 % — LOW (ref 39–50)
HCT VFR BLD CALC: 28.8 % — LOW (ref 39–50)
HCT VFR BLD CALC: 28.8 % — LOW (ref 39–50)
HCT VFR BLD CALC: 28.9 % — LOW (ref 39–50)
HCT VFR BLD CALC: 29.1 % — LOW (ref 39–50)
HCT VFR BLD CALC: 29.3 % — LOW (ref 39–50)
HCT VFR BLD CALC: 29.4 % — LOW (ref 39–50)
HCT VFR BLD CALC: 29.5 % — LOW (ref 39–50)
HCT VFR BLD CALC: 29.5 % — LOW (ref 39–50)
HCT VFR BLD CALC: 29.6 % — LOW (ref 39–50)
HCT VFR BLD CALC: 29.8 % — LOW (ref 39–50)
HCT VFR BLD CALC: 29.8 % — LOW (ref 39–50)
HCT VFR BLD CALC: 29.9 %
HCT VFR BLD CALC: 29.9 % — LOW (ref 39–50)
HCT VFR BLD CALC: 30.3 % — LOW (ref 39–50)
HCT VFR BLD CALC: 30.6 % — LOW (ref 39–50)
HCT VFR BLD CALC: 30.7 % — LOW (ref 39–50)
HCT VFR BLD CALC: 31.2 % — LOW (ref 39–50)
HCT VFR BLD CALC: 31.3 % — LOW (ref 39–50)
HCT VFR BLD CALC: 31.3 % — LOW (ref 39–50)
HCT VFR BLD CALC: 31.6 % — LOW (ref 39–50)
HCT VFR BLD CALC: 31.9 % — LOW (ref 39–50)
HCT VFR BLD CALC: 32.1 % — LOW (ref 39–50)
HCT VFR BLD CALC: 32.1 % — LOW (ref 39–50)
HCT VFR BLD CALC: 32.4 % — LOW (ref 39–50)
HCT VFR BLD CALC: 33 % — LOW (ref 39–50)
HCT VFR BLD CALC: 33.5 %
HCT VFR BLD CALC: 34.2 % — LOW (ref 39–50)
HCT VFR BLD CALC: 36.2 % — LOW (ref 39–50)
HCT VFR BLD CALC: 38.9 % — LOW (ref 39–50)
HCT VFR BLDA CALC: 20 % — CRITICAL LOW (ref 39–51)
HCT VFR BLDA CALC: 20 % — CRITICAL LOW (ref 39–51)
HCT VFR BLDA CALC: 23 % — LOW (ref 39–51)
HCT VFR BLDA CALC: 24 % — LOW (ref 39–51)
HCT VFR BLDA CALC: 25 % — LOW (ref 39–51)
HCT VFR BLDA CALC: 27 % — LOW (ref 39–51)
HCT VFR BLDA CALC: 28 % — LOW (ref 39–51)
HCT VFR BLDA CALC: 34 % — LOW (ref 39–51)
HCT VFR BLDA CALC: 35 % — LOW (ref 39–51)
HCV AB S/CO SERPL IA: 0.18 S/CO — SIGNIFICANT CHANGE UP (ref 0–0.99)
HCV AB S/CO SERPL IA: 0.19 S/CO — SIGNIFICANT CHANGE UP (ref 0–0.99)
HCV AB S/CO SERPL IA: 0.25 S/CO — SIGNIFICANT CHANGE UP (ref 0–0.99)
HCV AB SER QL: NONREACTIVE
HCV AB SERPL-IMP: SIGNIFICANT CHANGE UP
HCV RNA SERPL NAA+PROBE-LOG IU: NOT DETECTED LOGIU/ML
HCV RNA SPEC NAA+PROBE-LOG IU: SIGNIFICANT CHANGE UP
HCV RNA SPEC NAA+PROBE-LOG IU: SIGNIFICANT CHANGE UP LOGIU/ML
HCV S/CO RATIO: 0.25 S/CO
HCYS SERPL-MCNC: 13.4 UMOL/L — SIGNIFICANT CHANGE UP
HEPB DNA PCR INT: NOT DETECTED
HEPB DNA PCR LOG: NOT DETECTED LOGIU/ML
HEPC RNA INTERP: NOT DETECTED
HERPES-6 (HSV-6) PCR: ABNORMAL COPIES/ML
HERPES-6 (HSV-6) PCR: SIGNIFICANT CHANGE UP COPIES/ML
HGB BLD CALC-MCNC: 11.4 G/DL — LOW (ref 13–17)
HGB BLD CALC-MCNC: 11.5 G/DL — LOW (ref 12.6–17.4)
HGB BLD CALC-MCNC: 6.6 G/DL — CRITICAL LOW (ref 12.6–17.4)
HGB BLD CALC-MCNC: 6.6 G/DL — CRITICAL LOW (ref 12.6–17.4)
HGB BLD CALC-MCNC: 7.5 G/DL — LOW (ref 13–17)
HGB BLD CALC-MCNC: 8.1 G/DL — LOW (ref 12.6–17.4)
HGB BLD CALC-MCNC: 8.3 G/DL — LOW (ref 12.6–17.4)
HGB BLD CALC-MCNC: 9 G/DL — LOW (ref 12.6–17.4)
HGB BLD CALC-MCNC: 9 G/DL — LOW (ref 12.6–17.4)
HGB BLD CALC-MCNC: 9.1 G/DL — LOW (ref 12.6–17.4)
HGB BLD CALC-MCNC: 9.3 G/DL — LOW (ref 12.6–17.4)
HGB BLD-MCNC: 10 G/DL — LOW (ref 13–17)
HGB BLD-MCNC: 10.1 G/DL — LOW (ref 13–17)
HGB BLD-MCNC: 10.2 G/DL — LOW (ref 13–17)
HGB BLD-MCNC: 10.3 G/DL
HGB BLD-MCNC: 10.5 G/DL — LOW (ref 13–17)
HGB BLD-MCNC: 10.7 G/DL — LOW (ref 13–17)
HGB BLD-MCNC: 11 G/DL — LOW (ref 13–17)
HGB BLD-MCNC: 11.2 G/DL — LOW (ref 13–17)
HGB BLD-MCNC: 11.3 G/DL — LOW (ref 13–17)
HGB BLD-MCNC: 5.5 G/DL — CRITICAL LOW (ref 13–17)
HGB BLD-MCNC: 5.7 G/DL — CRITICAL LOW (ref 13–17)
HGB BLD-MCNC: 5.8 G/DL — CRITICAL LOW (ref 13–17)
HGB BLD-MCNC: 6 G/DL — CRITICAL LOW (ref 13–17)
HGB BLD-MCNC: 6 G/DL — CRITICAL LOW (ref 13–17)
HGB BLD-MCNC: 6.4 G/DL — CRITICAL LOW (ref 13–17)
HGB BLD-MCNC: 6.7 G/DL — CRITICAL LOW (ref 13–17)
HGB BLD-MCNC: 6.7 G/DL — CRITICAL LOW (ref 13–17)
HGB BLD-MCNC: 6.8 G/DL — CRITICAL LOW (ref 13–17)
HGB BLD-MCNC: 6.9 G/DL — CRITICAL LOW (ref 13–17)
HGB BLD-MCNC: 7 G/DL — CRITICAL LOW (ref 13–17)
HGB BLD-MCNC: 7.1 G/DL — LOW (ref 13–17)
HGB BLD-MCNC: 7.2 G/DL — LOW (ref 13–17)
HGB BLD-MCNC: 7.3 G/DL — LOW (ref 13–17)
HGB BLD-MCNC: 7.4 G/DL — LOW (ref 13–17)
HGB BLD-MCNC: 7.5 G/DL — LOW (ref 13–17)
HGB BLD-MCNC: 7.6 G/DL — LOW (ref 13–17)
HGB BLD-MCNC: 7.7 G/DL — LOW (ref 13–17)
HGB BLD-MCNC: 7.8 G/DL — LOW (ref 13–17)
HGB BLD-MCNC: 7.9 G/DL — LOW (ref 13–17)
HGB BLD-MCNC: 8 G/DL
HGB BLD-MCNC: 8 G/DL — LOW (ref 13–17)
HGB BLD-MCNC: 8.1 G/DL — LOW (ref 13–17)
HGB BLD-MCNC: 8.2 G/DL — LOW (ref 13–17)
HGB BLD-MCNC: 8.3 G/DL — LOW (ref 13–17)
HGB BLD-MCNC: 8.4 G/DL — LOW (ref 13–17)
HGB BLD-MCNC: 8.5 G/DL — LOW (ref 13–17)
HGB BLD-MCNC: 8.6 G/DL — LOW (ref 13–17)
HGB BLD-MCNC: 8.7 G/DL — LOW (ref 13–17)
HGB BLD-MCNC: 8.8 G/DL — LOW (ref 13–17)
HGB BLD-MCNC: 8.9 G/DL — LOW (ref 13–17)
HGB BLD-MCNC: 9 G/DL — LOW (ref 13–17)
HGB BLD-MCNC: 9 G/DL — LOW (ref 13–17)
HGB BLD-MCNC: 9.1 G/DL — LOW (ref 13–17)
HGB BLD-MCNC: 9.2 G/DL — LOW (ref 13–17)
HGB BLD-MCNC: 9.3 G/DL
HGB BLD-MCNC: 9.3 G/DL — LOW (ref 13–17)
HGB BLD-MCNC: 9.3 G/DL — LOW (ref 13–17)
HGB BLD-MCNC: 9.4 G/DL — LOW (ref 13–17)
HGB BLD-MCNC: 9.6 G/DL — LOW (ref 13–17)
HGB BLD-MCNC: 9.7 G/DL — LOW (ref 13–17)
HGB BLD-MCNC: 9.7 G/DL — LOW (ref 13–17)
HGB BLD-MCNC: 9.8 G/DL — LOW (ref 13–17)
HGB BLD-MCNC: 9.9 G/DL — LOW (ref 13–17)
HIV 1+2 AB+HIV1 P24 AG SERPL QL IA: SIGNIFICANT CHANGE UP
HIV1 RNA # SERPL NAA+PROBE: NORMAL
HIV1 RNA # SERPL NAA+PROBE: NORMAL COPIES/ML
HIV1+2 AB SPEC QL IA.RAPID: NONREACTIVE
HMPV RNA SPEC QL NAA+PROBE: SIGNIFICANT CHANGE UP
HOROWITZ INDEX BLDV+IHG-RTO: 21 — SIGNIFICANT CHANGE UP
HPIV1 RNA SPEC QL NAA+PROBE: SIGNIFICANT CHANGE UP
HPIV2 RNA SPEC QL NAA+PROBE: SIGNIFICANT CHANGE UP
HPIV3 RNA SPEC QL NAA+PROBE: SIGNIFICANT CHANGE UP
HPIV4 RNA SPEC QL NAA+PROBE: SIGNIFICANT CHANGE UP
HSV DNA1: SIGNIFICANT CHANGE UP
HSV DNA1: SIGNIFICANT CHANGE UP
HSV DNA2: SIGNIFICANT CHANGE UP
HSV DNA2: SIGNIFICANT CHANGE UP
HSV1 AB FLD QL: NEGATIVE — SIGNIFICANT CHANGE UP
HSV1 DNA BLD QL NAA+PROBE: SIGNIFICANT CHANGE UP
HSV1 DNA BLD QL NAA+PROBE: SIGNIFICANT CHANGE UP
HSV1 IGG SER-ACNC: 46.3 INDEX — HIGH
HSV1 IGG SERPL QL IA: POSITIVE
HSV2 AB FLD-ACNC: NEGATIVE — SIGNIFICANT CHANGE UP
HSV2 DNA BLD QL NAA+PROBE: SIGNIFICANT CHANGE UP
HSV2 DNA BLD QL NAA+PROBE: SIGNIFICANT CHANGE UP
HSV2 IGG FLD-ACNC: 0.14 INDEX — SIGNIFICANT CHANGE UP
HSV2 IGG SERPL QL IA: NEGATIVE — SIGNIFICANT CHANGE UP
HYALINE CASTS # UR AUTO: 0 /LPF — SIGNIFICANT CHANGE UP (ref 0–2)
HYALINE CASTS # UR AUTO: 11 /LPF — HIGH (ref 0–2)
HYALINE CASTS # UR AUTO: 5 /LPF — HIGH (ref 0–2)
HYALINE CASTS # UR AUTO: 7 /LPF — SIGNIFICANT CHANGE UP (ref 0–7)
HYALINE CASTS: 0 /LPF
HYPOCHROMIA BLD QL: SLIGHT — SIGNIFICANT CHANGE UP
IANC: 4.65 K/UL — SIGNIFICANT CHANGE UP (ref 1.8–7.4)
IGA FLD-MCNC: 387 MG/DL — SIGNIFICANT CHANGE UP (ref 84–499)
IGG FLD-MCNC: 1428 MG/DL — SIGNIFICANT CHANGE UP (ref 610–1660)
IGG SERPL-MCNC: 1173 MG/DL — SIGNIFICANT CHANGE UP (ref 603–1613)
IGG1 SER-MCNC: 577 MG/DL — SIGNIFICANT CHANGE UP (ref 248–810)
IGG2 SER-MCNC: 389 MG/DL — SIGNIFICANT CHANGE UP (ref 130–555)
IGG3 SER-MCNC: 34 MG/DL — SIGNIFICANT CHANGE UP (ref 15–102)
IGG4 SER-MCNC: 116 MG/DL — HIGH (ref 2–96)
IGM SERPL-MCNC: 75 MG/DL — SIGNIFICANT CHANGE UP (ref 35–242)
IL2 SERPL-MCNC: 4847.3 PG/ML — HIGH (ref 175.3–858.2)
IMM GRANULOCYTES NFR BLD AUTO: 0.5 %
IMM GRANULOCYTES NFR BLD AUTO: 0.5 % — SIGNIFICANT CHANGE UP (ref 0–1.5)
IMM GRANULOCYTES NFR BLD AUTO: 0.6 %
IMM GRANULOCYTES NFR BLD AUTO: 0.6 % — SIGNIFICANT CHANGE UP (ref 0–1.5)
IMM GRANULOCYTES NFR BLD AUTO: 0.7 % — SIGNIFICANT CHANGE UP (ref 0–1.5)
IMM GRANULOCYTES NFR BLD AUTO: 0.8 %
IMM GRANULOCYTES NFR BLD AUTO: 1 % — SIGNIFICANT CHANGE UP (ref 0–1.5)
IMM GRANULOCYTES NFR BLD AUTO: 1.1 % — SIGNIFICANT CHANGE UP (ref 0–1.5)
IMM GRANULOCYTES NFR BLD AUTO: 1.5 % — SIGNIFICANT CHANGE UP (ref 0–1.5)
IMM GRANULOCYTES NFR BLD AUTO: 1.6 % — HIGH (ref 0–1.5)
IMM GRANULOCYTES NFR BLD AUTO: 1.6 % — HIGH (ref 0–1.5)
IMM GRANULOCYTES NFR BLD AUTO: 1.7 % — HIGH (ref 0–1.5)
IMM GRANULOCYTES NFR BLD AUTO: 1.8 % — HIGH (ref 0–1.5)
IMM GRANULOCYTES NFR BLD AUTO: 1.8 % — HIGH (ref 0–1.5)
IMM GRANULOCYTES NFR BLD AUTO: 2.1 % — HIGH (ref 0–1.5)
IMM GRANULOCYTES NFR BLD AUTO: 2.1 % — HIGH (ref 0–1.5)
IMM GRANULOCYTES NFR BLD AUTO: 2.2 % — HIGH (ref 0–1.5)
IMM GRANULOCYTES NFR BLD AUTO: 2.2 % — HIGH (ref 0–1.5)
IMM GRANULOCYTES NFR BLD AUTO: 2.3 % — HIGH (ref 0–1.5)
IMM GRANULOCYTES NFR BLD AUTO: 2.3 % — HIGH (ref 0–1.5)
IMM GRANULOCYTES NFR BLD AUTO: 2.4 % — HIGH (ref 0–1.5)
IMM GRANULOCYTES NFR BLD AUTO: 29.5 % — HIGH (ref 0–1.5)
IMM GRANULOCYTES NFR BLD AUTO: 3 % — HIGH (ref 0–1.5)
IMM GRANULOCYTES NFR BLD AUTO: 3.1 % — HIGH (ref 0–1.5)
IMM GRANULOCYTES NFR BLD AUTO: 3.3 % — HIGH (ref 0–1.5)
IMM GRANULOCYTES NFR BLD AUTO: 3.3 % — HIGH (ref 0–1.5)
IMM GRANULOCYTES NFR BLD AUTO: 3.4 % — HIGH (ref 0–1.5)
IMM GRANULOCYTES NFR BLD AUTO: 3.6 % — HIGH (ref 0–1.5)
IMM GRANULOCYTES NFR BLD AUTO: 3.8 % — HIGH (ref 0–1.5)
IMM GRANULOCYTES NFR BLD AUTO: 4 % — HIGH (ref 0–1.5)
IMM GRANULOCYTES NFR BLD AUTO: 4.2 % — HIGH (ref 0–1.5)
IMM GRANULOCYTES NFR BLD AUTO: 4.6 % — HIGH (ref 0–1.5)
IMM GRANULOCYTES NFR BLD AUTO: 4.8 % — HIGH (ref 0–1.5)
IMM GRANULOCYTES NFR BLD AUTO: 5.1 % — HIGH (ref 0–1.5)
IMM GRANULOCYTES NFR BLD AUTO: 5.6 % — HIGH (ref 0–1.5)
IMM GRANULOCYTES NFR BLD AUTO: 6 % — HIGH (ref 0–1.5)
IMM GRANULOCYTES NFR BLD AUTO: 6.7 % — HIGH (ref 0–1.5)
IMM GRANULOCYTES NFR BLD AUTO: 6.9 % — HIGH (ref 0–0.9)
IMM GRANULOCYTES NFR BLD AUTO: 7.4 % — HIGH (ref 0–1.5)
IMM GRANULOCYTES NFR BLD AUTO: 7.7 % — HIGH (ref 0–1.5)
IMM GRANULOCYTES NFR BLD AUTO: 9.3 % — HIGH (ref 0–1.5)
INR BLD: 1.03 RATIO — SIGNIFICANT CHANGE UP (ref 0.88–1.16)
INR BLD: 1.06 RATIO — SIGNIFICANT CHANGE UP (ref 0.88–1.16)
INR BLD: 1.12 RATIO — SIGNIFICANT CHANGE UP (ref 0.88–1.16)
INR BLD: 1.12 RATIO — SIGNIFICANT CHANGE UP (ref 0.88–1.16)
INR BLD: 1.13 RATIO — SIGNIFICANT CHANGE UP (ref 0.88–1.16)
INR BLD: 1.14 RATIO — SIGNIFICANT CHANGE UP (ref 0.88–1.16)
INR BLD: 1.15 RATIO — SIGNIFICANT CHANGE UP (ref 0.88–1.16)
INR BLD: 1.15 RATIO — SIGNIFICANT CHANGE UP (ref 0.88–1.16)
INR BLD: 1.16 RATIO — SIGNIFICANT CHANGE UP (ref 0.88–1.16)
INR BLD: 1.17 RATIO — HIGH (ref 0.88–1.16)
INR BLD: 1.17 RATIO — HIGH (ref 0.88–1.16)
INR BLD: 1.18 RATIO — HIGH (ref 0.88–1.16)
INR BLD: 1.19 RATIO — HIGH (ref 0.88–1.16)
INR BLD: 1.2 RATIO — HIGH (ref 0.88–1.16)
INR BLD: 1.21 RATIO — HIGH (ref 0.88–1.16)
INR BLD: 1.21 RATIO — HIGH (ref 0.88–1.16)
INR BLD: 1.22 RATIO — HIGH (ref 0.88–1.16)
INR BLD: 1.23 RATIO — HIGH (ref 0.88–1.16)
INR BLD: 1.24 RATIO — HIGH (ref 0.88–1.16)
INR BLD: 1.24 RATIO — HIGH (ref 0.88–1.16)
INR BLD: 1.25 RATIO — HIGH (ref 0.88–1.16)
INR BLD: 1.26 RATIO — HIGH (ref 0.88–1.16)
INR BLD: 1.26 RATIO — HIGH (ref 0.88–1.16)
INR BLD: 1.27 RATIO — HIGH (ref 0.88–1.16)
INR BLD: 1.27 RATIO — HIGH (ref 0.88–1.16)
INR BLD: 1.28 RATIO — HIGH (ref 0.88–1.16)
INR BLD: 1.28 RATIO — HIGH (ref 0.88–1.16)
INR BLD: 1.29 RATIO — HIGH (ref 0.88–1.16)
INR BLD: 1.32 RATIO — HIGH (ref 0.88–1.16)
INR BLD: 1.33 RATIO — HIGH (ref 0.88–1.16)
INR BLD: 1.34 RATIO — HIGH (ref 0.88–1.16)
INR BLD: 1.34 RATIO — HIGH (ref 0.88–1.16)
INR BLD: 1.35 RATIO — HIGH (ref 0.88–1.16)
INR BLD: 1.36 RATIO — HIGH (ref 0.88–1.16)
INR BLD: 1.37 RATIO — HIGH (ref 0.88–1.16)
INR BLD: 1.38 RATIO — HIGH (ref 0.88–1.16)
INR BLD: 1.38 RATIO — HIGH (ref 0.88–1.16)
INR BLD: 1.39 RATIO — HIGH (ref 0.88–1.16)
INR BLD: 1.4 RATIO — HIGH (ref 0.88–1.16)
INR BLD: 1.41 RATIO — HIGH (ref 0.88–1.16)
INR BLD: 1.43 RATIO — HIGH (ref 0.88–1.16)
INR BLD: 1.44 RATIO — HIGH (ref 0.88–1.16)
INR BLD: 1.45 RATIO — HIGH (ref 0.88–1.16)
INR BLD: 1.46 RATIO — HIGH (ref 0.88–1.16)
INR BLD: 1.47 RATIO — HIGH (ref 0.88–1.16)
INR BLD: 1.48 RATIO — HIGH (ref 0.88–1.16)
INR BLD: 1.49 RATIO — HIGH (ref 0.88–1.16)
INR BLD: 1.5 RATIO — HIGH (ref 0.88–1.16)
INR BLD: 1.5 RATIO — HIGH (ref 0.88–1.16)
INR BLD: 1.51 RATIO — HIGH (ref 0.88–1.16)
INR BLD: 1.52 RATIO — HIGH (ref 0.88–1.16)
INR BLD: 1.56 RATIO — HIGH (ref 0.88–1.16)
INR BLD: 1.58 RATIO — HIGH (ref 0.88–1.16)
INR BLD: 1.6 RATIO — HIGH (ref 0.88–1.16)
INR BLD: 1.6 RATIO — HIGH (ref 0.88–1.16)
INR BLD: 1.62 RATIO — HIGH (ref 0.88–1.16)
INR BLD: 1.65 RATIO — HIGH (ref 0.88–1.16)
INR BLD: 1.65 RATIO — HIGH (ref 0.88–1.16)
INR BLD: 1.71 RATIO — HIGH (ref 0.88–1.16)
INR BLD: 1.74 RATIO — HIGH (ref 0.88–1.16)
INR BLD: 1.77 RATIO — HIGH (ref 0.88–1.16)
INR BLD: 1.77 RATIO — HIGH (ref 0.88–1.16)
INR BLD: 1.82 RATIO — HIGH (ref 0.88–1.16)
INR BLD: 1.83 RATIO — HIGH (ref 0.88–1.16)
INR BLD: 1.87 RATIO — HIGH (ref 0.88–1.16)
INR BLD: 1.9 RATIO — HIGH (ref 0.88–1.16)
INR BLD: 1.91 RATIO — HIGH (ref 0.88–1.16)
INR BLD: 1.98 RATIO — HIGH (ref 0.88–1.16)
INR BLD: 2.1 RATIO — HIGH (ref 0.88–1.16)
INR BLD: 2.11 RATIO — HIGH (ref 0.88–1.16)
INR BLD: 2.3 RATIO — HIGH (ref 0.88–1.16)
INR BLD: 2.3 RATIO — HIGH (ref 0.88–1.16)
INR BLD: 2.35 RATIO — HIGH (ref 0.88–1.16)
INR BLD: 2.43 RATIO — HIGH (ref 0.88–1.16)
INR BLD: 2.43 RATIO — HIGH (ref 0.88–1.16)
INR BLD: 2.45 RATIO — HIGH (ref 0.88–1.16)
INR BLD: 2.46 RATIO — HIGH (ref 0.88–1.16)
INR BLD: 2.49 RATIO — HIGH (ref 0.88–1.16)
INR BLD: 2.6 RATIO — HIGH (ref 0.88–1.16)
INR BLD: 2.61 RATIO — HIGH (ref 0.88–1.16)
INR BLD: 2.62 RATIO — HIGH (ref 0.88–1.16)
INR BLD: 2.67 RATIO — HIGH (ref 0.88–1.16)
INR BLD: 2.69 RATIO — HIGH (ref 0.88–1.16)
INR BLD: 2.72 RATIO — HIGH (ref 0.88–1.16)
INR BLD: 2.82 RATIO — HIGH (ref 0.88–1.16)
INR BLD: 3.02 RATIO — HIGH (ref 0.88–1.16)
INR BLD: 3.11 RATIO — HIGH (ref 0.88–1.16)
INR BLD: 3.16 RATIO — HIGH (ref 0.88–1.16)
INR BLD: 3.24 RATIO — HIGH (ref 0.88–1.16)
INR BLD: 4.35 RATIO — HIGH (ref 0.88–1.16)
INR BLD: 5.63 RATIO — CRITICAL HIGH (ref 0.88–1.16)
INTERPRETATION SERPL IFE-IMP: SIGNIFICANT CHANGE UP
INTERPRETATION SERPL IFE-IMP: SIGNIFICANT CHANGE UP
IRON SATN MFR SERPL: 10 % — LOW (ref 16–55)
IRON SATN MFR SERPL: 11 % — LOW (ref 16–55)
IRON SATN MFR SERPL: 13 UG/DL — LOW (ref 45–165)
IRON SATN MFR SERPL: 17 UG/DL — LOW (ref 45–165)
IRON SATN MFR SERPL: 29 % — SIGNIFICANT CHANGE UP (ref 16–55)
IRON SATN MFR SERPL: 55 UG/DL — SIGNIFICANT CHANGE UP (ref 45–165)
JCPYV DNA # CSF NAA+PROBE: SIGNIFICANT CHANGE UP COPIES/ML
JCPYV DNA SPEC QL NAA+PROBE: NEGATIVE — SIGNIFICANT CHANGE UP
JCPYV DNA SPEC QL NAA+PROBE: SIGNIFICANT CHANGE UP
KAPPA LC SER QL IFE: 22.34 MG/DL — HIGH (ref 0.33–1.94)
KAPPA/LAMBDA FREE LIGHT CHAIN RATIO, SERUM: 1.72 RATIO — HIGH (ref 0.26–1.65)
KETONES UR-MCNC: NEGATIVE — SIGNIFICANT CHANGE UP
KETONES URINE: NEGATIVE
LABORATORY COMMENT REPORT: SIGNIFICANT CHANGE UP
LACTATE BLDV-MCNC: 0.8 MMOL/L — SIGNIFICANT CHANGE UP (ref 0.7–2)
LACTATE BLDV-MCNC: 1.2 MMOL/L — SIGNIFICANT CHANGE UP (ref 0.5–2)
LACTATE BLDV-MCNC: 1.2 MMOL/L — SIGNIFICANT CHANGE UP (ref 0.5–2)
LACTATE BLDV-MCNC: 1.3 MMOL/L — SIGNIFICANT CHANGE UP (ref 0.7–2)
LACTATE BLDV-MCNC: 1.3 MMOL/L — SIGNIFICANT CHANGE UP (ref 0.7–2)
LACTATE BLDV-MCNC: 1.4 MMOL/L — SIGNIFICANT CHANGE UP (ref 0.7–2)
LACTATE BLDV-MCNC: 1.4 MMOL/L — SIGNIFICANT CHANGE UP (ref 0.7–2)
LACTATE BLDV-MCNC: 1.7 MMOL/L — SIGNIFICANT CHANGE UP (ref 0.7–2)
LACTATE BLDV-MCNC: 2 MMOL/L — SIGNIFICANT CHANGE UP (ref 0.5–2)
LACTATE BLDV-MCNC: 2.5 MMOL/L — HIGH (ref 0.7–2)
LACTATE BLDV-MCNC: 2.5 MMOL/L — HIGH (ref 0.7–2)
LACTATE BLDV-MCNC: 3.1 MMOL/L — HIGH (ref 0.7–2)
LACTATE SERPL-SCNC: 0.9 MMOL/L — SIGNIFICANT CHANGE UP (ref 0.7–2)
LACTATE SERPL-SCNC: 1 MMOL/L — SIGNIFICANT CHANGE UP (ref 0.7–2)
LAMBDA LC SER QL IFE: 13.02 MG/DL — HIGH (ref 0.57–2.63)
LDH CSF L TO P-CCNC: 37 U/L — SIGNIFICANT CHANGE UP
LDH FLD-CCNC: 37 U/L — SIGNIFICANT CHANGE UP
LDH PNL SERPL: 267 IU/L — HIGH (ref 121–224)
LDH SERPL L TO P-CCNC: 110 U/L — SIGNIFICANT CHANGE UP
LDH SERPL L TO P-CCNC: 223 U/L — SIGNIFICANT CHANGE UP (ref 50–242)
LDH SERPL L TO P-CCNC: 246 U/L — HIGH (ref 50–242)
LDH SERPL L TO P-CCNC: 257 U/L — HIGH (ref 50–242)
LDH SERPL L TO P-CCNC: 260 U/L — HIGH (ref 50–242)
LDH SERPL L TO P-CCNC: 340 U/L — HIGH (ref 50–242)
LDH SERPL L TO P-CCNC: 367 U/L — HIGH (ref 50–242)
LDH SERPL L TO P-CCNC: 655 U/L — HIGH (ref 50–242)
LDH SERPL L TO P-CCNC: 785 U/L — HIGH (ref 50–242)
LDH SERPL-CCNC: 236 U/L
LDH SERPL-CCNC: 263 U/L
LDH1 SERPL-CCNC: 20 % — SIGNIFICANT CHANGE UP (ref 17–32)
LDH3 SERPL-CCNC: 23 % — SIGNIFICANT CHANGE UP (ref 17–27)
LDH3 SERPL-CCNC: 30 % — SIGNIFICANT CHANGE UP (ref 25–40)
LDH4 SERPL-CCNC: 11 % — SIGNIFICANT CHANGE UP (ref 5–13)
LDH5 SERPL-CCNC: 16 % — SIGNIFICANT CHANGE UP (ref 4–20)
LEUKOCYTE ESTERASE UR-ACNC: ABNORMAL
LEUKOCYTE ESTERASE UR-ACNC: NEGATIVE — SIGNIFICANT CHANGE UP
LEUKOCYTE ESTERASE URINE: ABNORMAL
LEVETIRACETAM SERPL-MCNC: 11 UG/ML — SIGNIFICANT CHANGE UP (ref 10–40)
LEVETIRACETAM SERPL-MCNC: 15.9 UG/ML — SIGNIFICANT CHANGE UP (ref 10–40)
LEVETIRACETAM SERPL-MCNC: 26.1 UG/ML — SIGNIFICANT CHANGE UP (ref 10–40)
LIDOCAIN IGE QN: 28 U/L — SIGNIFICANT CHANGE UP (ref 7–60)
LIDOCAIN IGE QN: 30 U/L — SIGNIFICANT CHANGE UP (ref 7–60)
LMWH PPP CHRO-ACNC: 0.17 IU/ML — LOW (ref 0.5–1.1)
LYMPHOCYTES # BLD AUTO: 0.06 K/UL — LOW (ref 1–3.3)
LYMPHOCYTES # BLD AUTO: 0.06 K/UL — LOW (ref 1–3.3)
LYMPHOCYTES # BLD AUTO: 0.1 K/UL — LOW (ref 1–3.3)
LYMPHOCYTES # BLD AUTO: 0.11 K/UL — LOW (ref 1–3.3)
LYMPHOCYTES # BLD AUTO: 0.13 K/UL — LOW (ref 1–3.3)
LYMPHOCYTES # BLD AUTO: 0.15 K/UL
LYMPHOCYTES # BLD AUTO: 0.16 K/UL — LOW (ref 1–3.3)
LYMPHOCYTES # BLD AUTO: 0.17 K/UL — LOW (ref 1–3.3)
LYMPHOCYTES # BLD AUTO: 0.18 K/UL — LOW (ref 1–3.3)
LYMPHOCYTES # BLD AUTO: 0.18 K/UL — LOW (ref 1–3.3)
LYMPHOCYTES # BLD AUTO: 0.19 K/UL — LOW (ref 1–3.3)
LYMPHOCYTES # BLD AUTO: 0.19 K/UL — LOW (ref 1–3.3)
LYMPHOCYTES # BLD AUTO: 0.2 K/UL — LOW (ref 1–3.3)
LYMPHOCYTES # BLD AUTO: 0.2 K/UL — LOW (ref 1–3.3)
LYMPHOCYTES # BLD AUTO: 0.22 K/UL — LOW (ref 1–3.3)
LYMPHOCYTES # BLD AUTO: 0.24 K/UL — LOW (ref 1–3.3)
LYMPHOCYTES # BLD AUTO: 0.24 K/UL — LOW (ref 1–3.3)
LYMPHOCYTES # BLD AUTO: 0.25 K/UL — LOW (ref 1–3.3)
LYMPHOCYTES # BLD AUTO: 0.26 K/UL — LOW (ref 1–3.3)
LYMPHOCYTES # BLD AUTO: 0.26 K/UL — LOW (ref 1–3.3)
LYMPHOCYTES # BLD AUTO: 0.27 K/UL — LOW (ref 1–3.3)
LYMPHOCYTES # BLD AUTO: 0.28 K/UL — LOW (ref 1–3.3)
LYMPHOCYTES # BLD AUTO: 0.29 K/UL — LOW (ref 1–3.3)
LYMPHOCYTES # BLD AUTO: 0.33 K/UL — LOW (ref 1–3.3)
LYMPHOCYTES # BLD AUTO: 0.34 K/UL
LYMPHOCYTES # BLD AUTO: 0.34 K/UL — LOW (ref 1–3.3)
LYMPHOCYTES # BLD AUTO: 0.35 K/UL — LOW (ref 1–3.3)
LYMPHOCYTES # BLD AUTO: 0.36 K/UL — LOW (ref 1–3.3)
LYMPHOCYTES # BLD AUTO: 0.37 K/UL — LOW (ref 1–3.3)
LYMPHOCYTES # BLD AUTO: 0.38 K/UL — LOW (ref 1–3.3)
LYMPHOCYTES # BLD AUTO: 0.38 K/UL — LOW (ref 1–3.3)
LYMPHOCYTES # BLD AUTO: 0.39 K/UL — LOW (ref 1–3.3)
LYMPHOCYTES # BLD AUTO: 0.4 % — LOW (ref 13–44)
LYMPHOCYTES # BLD AUTO: 0.41 K/UL — LOW (ref 1–3.3)
LYMPHOCYTES # BLD AUTO: 0.43 K/UL — LOW (ref 1–3.3)
LYMPHOCYTES # BLD AUTO: 0.44 K/UL
LYMPHOCYTES # BLD AUTO: 0.44 K/UL — LOW (ref 1–3.3)
LYMPHOCYTES # BLD AUTO: 0.45 K/UL — LOW (ref 1–3.3)
LYMPHOCYTES # BLD AUTO: 0.46 K/UL — LOW (ref 1–3.3)
LYMPHOCYTES # BLD AUTO: 0.46 K/UL — LOW (ref 1–3.3)
LYMPHOCYTES # BLD AUTO: 0.48 K/UL — LOW (ref 1–3.3)
LYMPHOCYTES # BLD AUTO: 0.48 K/UL — LOW (ref 1–3.3)
LYMPHOCYTES # BLD AUTO: 0.49 K/UL — LOW (ref 1–3.3)
LYMPHOCYTES # BLD AUTO: 0.49 K/UL — LOW (ref 1–3.3)
LYMPHOCYTES # BLD AUTO: 0.5 K/UL — LOW (ref 1–3.3)
LYMPHOCYTES # BLD AUTO: 0.51 K/UL — LOW (ref 1–3.3)
LYMPHOCYTES # BLD AUTO: 0.51 K/UL — LOW (ref 1–3.3)
LYMPHOCYTES # BLD AUTO: 0.52 K/UL — LOW (ref 1–3.3)
LYMPHOCYTES # BLD AUTO: 0.52 K/UL — LOW (ref 1–3.3)
LYMPHOCYTES # BLD AUTO: 0.54 K/UL — LOW (ref 1–3.3)
LYMPHOCYTES # BLD AUTO: 0.55 K/UL — LOW (ref 1–3.3)
LYMPHOCYTES # BLD AUTO: 0.56 K/UL — LOW (ref 1–3.3)
LYMPHOCYTES # BLD AUTO: 0.58 K/UL — LOW (ref 1–3.3)
LYMPHOCYTES # BLD AUTO: 0.58 K/UL — LOW (ref 1–3.3)
LYMPHOCYTES # BLD AUTO: 0.6 K/UL — LOW (ref 1–3.3)
LYMPHOCYTES # BLD AUTO: 0.67 K/UL — LOW (ref 1–3.3)
LYMPHOCYTES # BLD AUTO: 0.8 % — LOW (ref 13–44)
LYMPHOCYTES # BLD AUTO: 0.82 K/UL — LOW (ref 1–3.3)
LYMPHOCYTES # BLD AUTO: 0.9 % — LOW (ref 13–44)
LYMPHOCYTES # BLD AUTO: 0.9 % — LOW (ref 13–44)
LYMPHOCYTES # BLD AUTO: 1.12 K/UL — SIGNIFICANT CHANGE UP (ref 1–3.3)
LYMPHOCYTES # BLD AUTO: 1.48 K/UL — SIGNIFICANT CHANGE UP (ref 1–3.3)
LYMPHOCYTES # BLD AUTO: 1.5 % — LOW (ref 13–44)
LYMPHOCYTES # BLD AUTO: 1.7 % — LOW (ref 13–44)
LYMPHOCYTES # BLD AUTO: 1.7 % — LOW (ref 13–44)
LYMPHOCYTES # BLD AUTO: 11 % — LOW (ref 13–44)
LYMPHOCYTES # BLD AUTO: 11.1 % — LOW (ref 13–44)
LYMPHOCYTES # BLD AUTO: 11.5 % — LOW (ref 13–44)
LYMPHOCYTES # BLD AUTO: 13.1 % — SIGNIFICANT CHANGE UP (ref 13–44)
LYMPHOCYTES # BLD AUTO: 14 % — SIGNIFICANT CHANGE UP (ref 13–44)
LYMPHOCYTES # BLD AUTO: 14.5 % — SIGNIFICANT CHANGE UP (ref 13–44)
LYMPHOCYTES # BLD AUTO: 17.1 % — SIGNIFICANT CHANGE UP (ref 13–44)
LYMPHOCYTES # BLD AUTO: 17.3 % — SIGNIFICANT CHANGE UP (ref 13–44)
LYMPHOCYTES # BLD AUTO: 19.1 % — SIGNIFICANT CHANGE UP (ref 13–44)
LYMPHOCYTES # BLD AUTO: 2.3 % — LOW (ref 13–44)
LYMPHOCYTES # BLD AUTO: 2.6 % — LOW (ref 13–44)
LYMPHOCYTES # BLD AUTO: 2.7 % — LOW (ref 13–44)
LYMPHOCYTES # BLD AUTO: 2.8 % — LOW (ref 13–44)
LYMPHOCYTES # BLD AUTO: 2.8 % — LOW (ref 13–44)
LYMPHOCYTES # BLD AUTO: 2.9 % — LOW (ref 13–44)
LYMPHOCYTES # BLD AUTO: 20.2 % — SIGNIFICANT CHANGE UP (ref 13–44)
LYMPHOCYTES # BLD AUTO: 20.2 % — SIGNIFICANT CHANGE UP (ref 13–44)
LYMPHOCYTES # BLD AUTO: 21 % — SIGNIFICANT CHANGE UP (ref 13–44)
LYMPHOCYTES # BLD AUTO: 3 % — LOW (ref 13–44)
LYMPHOCYTES # BLD AUTO: 3.1 % — LOW (ref 13–44)
LYMPHOCYTES # BLD AUTO: 3.1 % — LOW (ref 13–44)
LYMPHOCYTES # BLD AUTO: 3.2 % — LOW (ref 13–44)
LYMPHOCYTES # BLD AUTO: 3.2 % — LOW (ref 13–44)
LYMPHOCYTES # BLD AUTO: 3.5 % — LOW (ref 13–44)
LYMPHOCYTES # BLD AUTO: 3.7 % — LOW (ref 13–44)
LYMPHOCYTES # BLD AUTO: 3.8 % — LOW (ref 13–44)
LYMPHOCYTES # BLD AUTO: 3.9 % — LOW (ref 13–44)
LYMPHOCYTES # BLD AUTO: 4.1 % — LOW (ref 13–44)
LYMPHOCYTES # BLD AUTO: 4.2 % — LOW (ref 13–44)
LYMPHOCYTES # BLD AUTO: 4.8 % — LOW (ref 13–44)
LYMPHOCYTES # BLD AUTO: 5 % — LOW (ref 13–44)
LYMPHOCYTES # BLD AUTO: 5.1 % — LOW (ref 13–44)
LYMPHOCYTES # BLD AUTO: 5.5 % — LOW (ref 13–44)
LYMPHOCYTES # BLD AUTO: 5.7 % — LOW (ref 13–44)
LYMPHOCYTES # BLD AUTO: 5.7 % — LOW (ref 13–44)
LYMPHOCYTES # BLD AUTO: 5.8 % — LOW (ref 13–44)
LYMPHOCYTES # BLD AUTO: 5.8 % — LOW (ref 13–44)
LYMPHOCYTES # BLD AUTO: 5.9 % — LOW (ref 13–44)
LYMPHOCYTES # BLD AUTO: 6 % — LOW (ref 13–44)
LYMPHOCYTES # BLD AUTO: 6.1 % — LOW (ref 13–44)
LYMPHOCYTES # BLD AUTO: 6.2 % — LOW (ref 13–44)
LYMPHOCYTES # BLD AUTO: 6.5 % — LOW (ref 13–44)
LYMPHOCYTES # BLD AUTO: 6.8 % — LOW (ref 13–44)
LYMPHOCYTES # BLD AUTO: 7.5 % — LOW (ref 13–44)
LYMPHOCYTES # BLD AUTO: 7.8 % — LOW (ref 13–44)
LYMPHOCYTES # BLD AUTO: 7.9 % — LOW (ref 13–44)
LYMPHOCYTES # BLD AUTO: 8 % — LOW (ref 13–44)
LYMPHOCYTES # BLD AUTO: 9.7 % — LOW (ref 13–44)
LYMPHOCYTES # BLD AUTO: 9.9 % — LOW (ref 13–44)
LYMPHOCYTES # CSF: 58 % — SIGNIFICANT CHANGE UP (ref 40–80)
LYMPHOCYTES # FLD: 31 % — SIGNIFICANT CHANGE UP
LYMPHOCYTES NFR BLD AUTO: 2 %
LYMPHOCYTES NFR BLD AUTO: 2.9 %
LYMPHOCYTES NFR BLD AUTO: 6.8 %
M PNEUMO DNA SPEC QL NAA+PROBE: SIGNIFICANT CHANGE UP
MACROCYTES BLD QL: SIGNIFICANT CHANGE UP
MACROCYTES BLD QL: SIGNIFICANT CHANGE UP
MACROCYTES BLD QL: SLIGHT — SIGNIFICANT CHANGE UP
MAGNESIUM SERPL-MCNC: 1.6 MG/DL
MAGNESIUM SERPL-MCNC: 1.6 MG/DL — SIGNIFICANT CHANGE UP (ref 1.6–2.6)
MAGNESIUM SERPL-MCNC: 1.7 MG/DL — SIGNIFICANT CHANGE UP (ref 1.6–2.6)
MAGNESIUM SERPL-MCNC: 1.8 MG/DL — SIGNIFICANT CHANGE UP (ref 1.6–2.6)
MAGNESIUM SERPL-MCNC: 1.9 MG/DL — SIGNIFICANT CHANGE UP (ref 1.6–2.6)
MAGNESIUM SERPL-MCNC: 2 MG/DL
MAGNESIUM SERPL-MCNC: 2 MG/DL — SIGNIFICANT CHANGE UP (ref 1.6–2.6)
MAGNESIUM SERPL-MCNC: 2.1 MG/DL — SIGNIFICANT CHANGE UP (ref 1.6–2.6)
MAGNESIUM SERPL-MCNC: 2.2 MG/DL
MAGNESIUM SERPL-MCNC: 2.2 MG/DL — SIGNIFICANT CHANGE UP (ref 1.6–2.6)
MAGNESIUM SERPL-MCNC: 2.3 MG/DL — SIGNIFICANT CHANGE UP (ref 1.6–2.6)
MAGNESIUM SERPL-MCNC: 2.4 MG/DL — SIGNIFICANT CHANGE UP (ref 1.6–2.6)
MAN DIFF?: NORMAL
MANUAL SMEAR VERIFICATION: SIGNIFICANT CHANGE UP
MCHC RBC-ENTMCNC: 25.6 PG — LOW (ref 27–34)
MCHC RBC-ENTMCNC: 26.5 PG — LOW (ref 27–34)
MCHC RBC-ENTMCNC: 26.6 PG — LOW (ref 27–34)
MCHC RBC-ENTMCNC: 26.7 PG — LOW (ref 27–34)
MCHC RBC-ENTMCNC: 27.1 PG — SIGNIFICANT CHANGE UP (ref 27–34)
MCHC RBC-ENTMCNC: 27.1 PG — SIGNIFICANT CHANGE UP (ref 27–34)
MCHC RBC-ENTMCNC: 27.2 PG — SIGNIFICANT CHANGE UP (ref 27–34)
MCHC RBC-ENTMCNC: 27.4 PG — SIGNIFICANT CHANGE UP (ref 27–34)
MCHC RBC-ENTMCNC: 27.5 PG — SIGNIFICANT CHANGE UP (ref 27–34)
MCHC RBC-ENTMCNC: 27.6 PG — SIGNIFICANT CHANGE UP (ref 27–34)
MCHC RBC-ENTMCNC: 27.7 PG — SIGNIFICANT CHANGE UP (ref 27–34)
MCHC RBC-ENTMCNC: 27.8 PG — SIGNIFICANT CHANGE UP (ref 27–34)
MCHC RBC-ENTMCNC: 27.9 GM/DL — LOW (ref 32–36)
MCHC RBC-ENTMCNC: 27.9 PG — SIGNIFICANT CHANGE UP (ref 27–34)
MCHC RBC-ENTMCNC: 28 PG — SIGNIFICANT CHANGE UP (ref 27–34)
MCHC RBC-ENTMCNC: 28 PG — SIGNIFICANT CHANGE UP (ref 27–34)
MCHC RBC-ENTMCNC: 28.1 PG — SIGNIFICANT CHANGE UP (ref 27–34)
MCHC RBC-ENTMCNC: 28.1 PG — SIGNIFICANT CHANGE UP (ref 27–34)
MCHC RBC-ENTMCNC: 28.2 PG — SIGNIFICANT CHANGE UP (ref 27–34)
MCHC RBC-ENTMCNC: 28.3 GM/DL — LOW (ref 32–36)
MCHC RBC-ENTMCNC: 28.3 PG — SIGNIFICANT CHANGE UP (ref 27–34)
MCHC RBC-ENTMCNC: 28.4 GM/DL — LOW (ref 32–36)
MCHC RBC-ENTMCNC: 28.4 PG — SIGNIFICANT CHANGE UP (ref 27–34)
MCHC RBC-ENTMCNC: 28.5 PG — SIGNIFICANT CHANGE UP (ref 27–34)
MCHC RBC-ENTMCNC: 28.6 PG — SIGNIFICANT CHANGE UP (ref 27–34)
MCHC RBC-ENTMCNC: 28.7 PG — SIGNIFICANT CHANGE UP (ref 27–34)
MCHC RBC-ENTMCNC: 28.8 PG — SIGNIFICANT CHANGE UP (ref 27–34)
MCHC RBC-ENTMCNC: 28.9 GM/DL — LOW (ref 32–36)
MCHC RBC-ENTMCNC: 28.9 PG — SIGNIFICANT CHANGE UP (ref 27–34)
MCHC RBC-ENTMCNC: 29 GM/DL — LOW (ref 32–36)
MCHC RBC-ENTMCNC: 29 PG — SIGNIFICANT CHANGE UP (ref 27–34)
MCHC RBC-ENTMCNC: 29.1 PG — SIGNIFICANT CHANGE UP (ref 27–34)
MCHC RBC-ENTMCNC: 29.2 PG — SIGNIFICANT CHANGE UP (ref 27–34)
MCHC RBC-ENTMCNC: 29.3 GM/DL — LOW (ref 32–36)
MCHC RBC-ENTMCNC: 29.3 PG — SIGNIFICANT CHANGE UP (ref 27–34)
MCHC RBC-ENTMCNC: 29.4 PG — SIGNIFICANT CHANGE UP (ref 27–34)
MCHC RBC-ENTMCNC: 29.5 PG — SIGNIFICANT CHANGE UP (ref 27–34)
MCHC RBC-ENTMCNC: 29.6 GM/DL — LOW (ref 32–36)
MCHC RBC-ENTMCNC: 29.6 GM/DL — LOW (ref 32–36)
MCHC RBC-ENTMCNC: 29.6 PG — SIGNIFICANT CHANGE UP (ref 27–34)
MCHC RBC-ENTMCNC: 29.7 PG
MCHC RBC-ENTMCNC: 29.7 PG — SIGNIFICANT CHANGE UP (ref 27–34)
MCHC RBC-ENTMCNC: 29.8 PG — SIGNIFICANT CHANGE UP (ref 27–34)
MCHC RBC-ENTMCNC: 29.8 PG — SIGNIFICANT CHANGE UP (ref 27–34)
MCHC RBC-ENTMCNC: 29.9 GM/DL — LOW (ref 32–36)
MCHC RBC-ENTMCNC: 29.9 PG
MCHC RBC-ENTMCNC: 29.9 PG — SIGNIFICANT CHANGE UP (ref 27–34)
MCHC RBC-ENTMCNC: 30 GM/DL — LOW (ref 32–36)
MCHC RBC-ENTMCNC: 30 PG
MCHC RBC-ENTMCNC: 30 PG — SIGNIFICANT CHANGE UP (ref 27–34)
MCHC RBC-ENTMCNC: 30.1 GM/DL — LOW (ref 32–36)
MCHC RBC-ENTMCNC: 30.1 GM/DL — LOW (ref 32–36)
MCHC RBC-ENTMCNC: 30.1 PG — SIGNIFICANT CHANGE UP (ref 27–34)
MCHC RBC-ENTMCNC: 30.2 GM/DL — LOW (ref 32–36)
MCHC RBC-ENTMCNC: 30.2 PG — SIGNIFICANT CHANGE UP (ref 27–34)
MCHC RBC-ENTMCNC: 30.3 GM/DL — LOW (ref 32–36)
MCHC RBC-ENTMCNC: 30.3 PG — SIGNIFICANT CHANGE UP (ref 27–34)
MCHC RBC-ENTMCNC: 30.4 GM/DL — LOW (ref 32–36)
MCHC RBC-ENTMCNC: 30.4 PG — SIGNIFICANT CHANGE UP (ref 27–34)
MCHC RBC-ENTMCNC: 30.5 GM/DL — LOW (ref 32–36)
MCHC RBC-ENTMCNC: 30.5 PG — SIGNIFICANT CHANGE UP (ref 27–34)
MCHC RBC-ENTMCNC: 30.6 GM/DL — LOW (ref 32–36)
MCHC RBC-ENTMCNC: 30.6 PG — SIGNIFICANT CHANGE UP (ref 27–34)
MCHC RBC-ENTMCNC: 30.7 GM/DL
MCHC RBC-ENTMCNC: 30.7 GM/DL — LOW (ref 32–36)
MCHC RBC-ENTMCNC: 30.7 PG — SIGNIFICANT CHANGE UP (ref 27–34)
MCHC RBC-ENTMCNC: 30.8 GM/DL — LOW (ref 32–36)
MCHC RBC-ENTMCNC: 30.8 PG — SIGNIFICANT CHANGE UP (ref 27–34)
MCHC RBC-ENTMCNC: 30.9 GM/DL — LOW (ref 32–36)
MCHC RBC-ENTMCNC: 30.9 PG — SIGNIFICANT CHANGE UP (ref 27–34)
MCHC RBC-ENTMCNC: 31 GM/DL
MCHC RBC-ENTMCNC: 31 GM/DL — LOW (ref 32–36)
MCHC RBC-ENTMCNC: 31.1 GM/DL
MCHC RBC-ENTMCNC: 31.1 GM/DL — LOW (ref 32–36)
MCHC RBC-ENTMCNC: 31.1 PG — SIGNIFICANT CHANGE UP (ref 27–34)
MCHC RBC-ENTMCNC: 31.2 GM/DL — LOW (ref 32–36)
MCHC RBC-ENTMCNC: 31.2 PG — SIGNIFICANT CHANGE UP (ref 27–34)
MCHC RBC-ENTMCNC: 31.3 GM/DL — LOW (ref 32–36)
MCHC RBC-ENTMCNC: 31.4 GM/DL — LOW (ref 32–36)
MCHC RBC-ENTMCNC: 31.5 GM/DL — LOW (ref 32–36)
MCHC RBC-ENTMCNC: 31.6 GM/DL — LOW (ref 32–36)
MCHC RBC-ENTMCNC: 31.7 GM/DL — LOW (ref 32–36)
MCHC RBC-ENTMCNC: 31.8 GM/DL — LOW (ref 32–36)
MCHC RBC-ENTMCNC: 31.9 GM/DL — LOW (ref 32–36)
MCHC RBC-ENTMCNC: 32 GM/DL — SIGNIFICANT CHANGE UP (ref 32–36)
MCHC RBC-ENTMCNC: 32.1 GM/DL — SIGNIFICANT CHANGE UP (ref 32–36)
MCHC RBC-ENTMCNC: 32.2 GM/DL — SIGNIFICANT CHANGE UP (ref 32–36)
MCHC RBC-ENTMCNC: 32.3 GM/DL — SIGNIFICANT CHANGE UP (ref 32–36)
MCHC RBC-ENTMCNC: 32.4 GM/DL — SIGNIFICANT CHANGE UP (ref 32–36)
MCHC RBC-ENTMCNC: 32.5 GM/DL — SIGNIFICANT CHANGE UP (ref 32–36)
MCHC RBC-ENTMCNC: 32.6 GM/DL — SIGNIFICANT CHANGE UP (ref 32–36)
MCHC RBC-ENTMCNC: 32.7 GM/DL — SIGNIFICANT CHANGE UP (ref 32–36)
MCHC RBC-ENTMCNC: 32.8 GM/DL — SIGNIFICANT CHANGE UP (ref 32–36)
MCHC RBC-ENTMCNC: 32.9 GM/DL — SIGNIFICANT CHANGE UP (ref 32–36)
MCHC RBC-ENTMCNC: 33 GM/DL — SIGNIFICANT CHANGE UP (ref 32–36)
MCHC RBC-ENTMCNC: 33.1 GM/DL — SIGNIFICANT CHANGE UP (ref 32–36)
MCHC RBC-ENTMCNC: 33.2 GM/DL — SIGNIFICANT CHANGE UP (ref 32–36)
MCHC RBC-ENTMCNC: 33.2 GM/DL — SIGNIFICANT CHANGE UP (ref 32–36)
MCHC RBC-ENTMCNC: 33.3 GM/DL — SIGNIFICANT CHANGE UP (ref 32–36)
MCHC RBC-ENTMCNC: 33.5 GM/DL — SIGNIFICANT CHANGE UP (ref 32–36)
MCHC RBC-ENTMCNC: 33.6 GM/DL — SIGNIFICANT CHANGE UP (ref 32–36)
MCHC RBC-ENTMCNC: 33.7 GM/DL — SIGNIFICANT CHANGE UP (ref 32–36)
MCHC RBC-ENTMCNC: 33.9 GM/DL — SIGNIFICANT CHANGE UP (ref 32–36)
MCHC RBC-ENTMCNC: 34 GM/DL — SIGNIFICANT CHANGE UP (ref 32–36)
MCHC RBC-ENTMCNC: 34.7 GM/DL — SIGNIFICANT CHANGE UP (ref 32–36)
MCV RBC AUTO: 87.3 FL — SIGNIFICANT CHANGE UP (ref 80–100)
MCV RBC AUTO: 87.4 FL — SIGNIFICANT CHANGE UP (ref 80–100)
MCV RBC AUTO: 87.7 FL — SIGNIFICANT CHANGE UP (ref 80–100)
MCV RBC AUTO: 88 FL — SIGNIFICANT CHANGE UP (ref 80–100)
MCV RBC AUTO: 88.1 FL — SIGNIFICANT CHANGE UP (ref 80–100)
MCV RBC AUTO: 88.1 FL — SIGNIFICANT CHANGE UP (ref 80–100)
MCV RBC AUTO: 88.6 FL — SIGNIFICANT CHANGE UP (ref 80–100)
MCV RBC AUTO: 88.7 FL — SIGNIFICANT CHANGE UP (ref 80–100)
MCV RBC AUTO: 88.9 FL — SIGNIFICANT CHANGE UP (ref 80–100)
MCV RBC AUTO: 89 FL — SIGNIFICANT CHANGE UP (ref 80–100)
MCV RBC AUTO: 89.2 FL — SIGNIFICANT CHANGE UP (ref 80–100)
MCV RBC AUTO: 89.3 FL — SIGNIFICANT CHANGE UP (ref 80–100)
MCV RBC AUTO: 89.4 FL — SIGNIFICANT CHANGE UP (ref 80–100)
MCV RBC AUTO: 89.4 FL — SIGNIFICANT CHANGE UP (ref 80–100)
MCV RBC AUTO: 89.5 FL — SIGNIFICANT CHANGE UP (ref 80–100)
MCV RBC AUTO: 89.6 FL — SIGNIFICANT CHANGE UP (ref 80–100)
MCV RBC AUTO: 89.6 FL — SIGNIFICANT CHANGE UP (ref 80–100)
MCV RBC AUTO: 89.8 FL — SIGNIFICANT CHANGE UP (ref 80–100)
MCV RBC AUTO: 89.9 FL — SIGNIFICANT CHANGE UP (ref 80–100)
MCV RBC AUTO: 89.9 FL — SIGNIFICANT CHANGE UP (ref 80–100)
MCV RBC AUTO: 90 FL — SIGNIFICANT CHANGE UP (ref 80–100)
MCV RBC AUTO: 90.1 FL — SIGNIFICANT CHANGE UP (ref 80–100)
MCV RBC AUTO: 90.2 FL — SIGNIFICANT CHANGE UP (ref 80–100)
MCV RBC AUTO: 90.2 FL — SIGNIFICANT CHANGE UP (ref 80–100)
MCV RBC AUTO: 90.3 FL — SIGNIFICANT CHANGE UP (ref 80–100)
MCV RBC AUTO: 90.4 FL — SIGNIFICANT CHANGE UP (ref 80–100)
MCV RBC AUTO: 90.5 FL — SIGNIFICANT CHANGE UP (ref 80–100)
MCV RBC AUTO: 90.6 FL — SIGNIFICANT CHANGE UP (ref 80–100)
MCV RBC AUTO: 90.7 FL — SIGNIFICANT CHANGE UP (ref 80–100)
MCV RBC AUTO: 90.8 FL — SIGNIFICANT CHANGE UP (ref 80–100)
MCV RBC AUTO: 90.9 FL — SIGNIFICANT CHANGE UP (ref 80–100)
MCV RBC AUTO: 91.1 FL — SIGNIFICANT CHANGE UP (ref 80–100)
MCV RBC AUTO: 91.1 FL — SIGNIFICANT CHANGE UP (ref 80–100)
MCV RBC AUTO: 91.2 FL — SIGNIFICANT CHANGE UP (ref 80–100)
MCV RBC AUTO: 91.3 FL — SIGNIFICANT CHANGE UP (ref 80–100)
MCV RBC AUTO: 91.4 FL — SIGNIFICANT CHANGE UP (ref 80–100)
MCV RBC AUTO: 91.5 FL — SIGNIFICANT CHANGE UP (ref 80–100)
MCV RBC AUTO: 91.5 FL — SIGNIFICANT CHANGE UP (ref 80–100)
MCV RBC AUTO: 91.6 FL — SIGNIFICANT CHANGE UP (ref 80–100)
MCV RBC AUTO: 91.7 FL — SIGNIFICANT CHANGE UP (ref 80–100)
MCV RBC AUTO: 91.7 FL — SIGNIFICANT CHANGE UP (ref 80–100)
MCV RBC AUTO: 91.8 FL — SIGNIFICANT CHANGE UP (ref 80–100)
MCV RBC AUTO: 91.8 FL — SIGNIFICANT CHANGE UP (ref 80–100)
MCV RBC AUTO: 91.9 FL — SIGNIFICANT CHANGE UP (ref 80–100)
MCV RBC AUTO: 92 FL — SIGNIFICANT CHANGE UP (ref 80–100)
MCV RBC AUTO: 92.1 FL — SIGNIFICANT CHANGE UP (ref 80–100)
MCV RBC AUTO: 92.2 FL — SIGNIFICANT CHANGE UP (ref 80–100)
MCV RBC AUTO: 92.3 FL — SIGNIFICANT CHANGE UP (ref 80–100)
MCV RBC AUTO: 92.4 FL — SIGNIFICANT CHANGE UP (ref 80–100)
MCV RBC AUTO: 92.5 FL — SIGNIFICANT CHANGE UP (ref 80–100)
MCV RBC AUTO: 92.6 FL — SIGNIFICANT CHANGE UP (ref 80–100)
MCV RBC AUTO: 92.7 FL — SIGNIFICANT CHANGE UP (ref 80–100)
MCV RBC AUTO: 92.8 FL — SIGNIFICANT CHANGE UP (ref 80–100)
MCV RBC AUTO: 92.9 FL — SIGNIFICANT CHANGE UP (ref 80–100)
MCV RBC AUTO: 93 FL — SIGNIFICANT CHANGE UP (ref 80–100)
MCV RBC AUTO: 93.1 FL — SIGNIFICANT CHANGE UP (ref 80–100)
MCV RBC AUTO: 93.2 FL — SIGNIFICANT CHANGE UP (ref 80–100)
MCV RBC AUTO: 93.3 FL — SIGNIFICANT CHANGE UP (ref 80–100)
MCV RBC AUTO: 93.4 FL — SIGNIFICANT CHANGE UP (ref 80–100)
MCV RBC AUTO: 93.4 FL — SIGNIFICANT CHANGE UP (ref 80–100)
MCV RBC AUTO: 93.5 FL — SIGNIFICANT CHANGE UP (ref 80–100)
MCV RBC AUTO: 93.5 FL — SIGNIFICANT CHANGE UP (ref 80–100)
MCV RBC AUTO: 93.6 FL — SIGNIFICANT CHANGE UP (ref 80–100)
MCV RBC AUTO: 93.7 FL — SIGNIFICANT CHANGE UP (ref 80–100)
MCV RBC AUTO: 93.8 FL — SIGNIFICANT CHANGE UP (ref 80–100)
MCV RBC AUTO: 93.8 FL — SIGNIFICANT CHANGE UP (ref 80–100)
MCV RBC AUTO: 93.9 FL — SIGNIFICANT CHANGE UP (ref 80–100)
MCV RBC AUTO: 94 FL — SIGNIFICANT CHANGE UP (ref 80–100)
MCV RBC AUTO: 94 FL — SIGNIFICANT CHANGE UP (ref 80–100)
MCV RBC AUTO: 94.1 FL — SIGNIFICANT CHANGE UP (ref 80–100)
MCV RBC AUTO: 94.2 FL — SIGNIFICANT CHANGE UP (ref 80–100)
MCV RBC AUTO: 94.3 FL — SIGNIFICANT CHANGE UP (ref 80–100)
MCV RBC AUTO: 94.3 FL — SIGNIFICANT CHANGE UP (ref 80–100)
MCV RBC AUTO: 94.4 FL — SIGNIFICANT CHANGE UP (ref 80–100)
MCV RBC AUTO: 94.4 FL — SIGNIFICANT CHANGE UP (ref 80–100)
MCV RBC AUTO: 94.5 FL — SIGNIFICANT CHANGE UP (ref 80–100)
MCV RBC AUTO: 94.6 FL — SIGNIFICANT CHANGE UP (ref 80–100)
MCV RBC AUTO: 94.6 FL — SIGNIFICANT CHANGE UP (ref 80–100)
MCV RBC AUTO: 94.7 FL — SIGNIFICANT CHANGE UP (ref 80–100)
MCV RBC AUTO: 94.7 FL — SIGNIFICANT CHANGE UP (ref 80–100)
MCV RBC AUTO: 94.8 FL — SIGNIFICANT CHANGE UP (ref 80–100)
MCV RBC AUTO: 94.9 FL — SIGNIFICANT CHANGE UP (ref 80–100)
MCV RBC AUTO: 95 FL — SIGNIFICANT CHANGE UP (ref 80–100)
MCV RBC AUTO: 95.1 FL — SIGNIFICANT CHANGE UP (ref 80–100)
MCV RBC AUTO: 95.2 FL — SIGNIFICANT CHANGE UP (ref 80–100)
MCV RBC AUTO: 95.2 FL — SIGNIFICANT CHANGE UP (ref 80–100)
MCV RBC AUTO: 95.3 FL — SIGNIFICANT CHANGE UP (ref 80–100)
MCV RBC AUTO: 95.4 FL — SIGNIFICANT CHANGE UP (ref 80–100)
MCV RBC AUTO: 95.5 FL
MCV RBC AUTO: 95.6 FL — SIGNIFICANT CHANGE UP (ref 80–100)
MCV RBC AUTO: 95.7 FL — SIGNIFICANT CHANGE UP (ref 80–100)
MCV RBC AUTO: 95.8 FL — SIGNIFICANT CHANGE UP (ref 80–100)
MCV RBC AUTO: 95.9 FL — SIGNIFICANT CHANGE UP (ref 80–100)
MCV RBC AUTO: 96.1 FL — SIGNIFICANT CHANGE UP (ref 80–100)
MCV RBC AUTO: 96.2 FL — SIGNIFICANT CHANGE UP (ref 80–100)
MCV RBC AUTO: 96.3 FL — SIGNIFICANT CHANGE UP (ref 80–100)
MCV RBC AUTO: 96.4 FL — SIGNIFICANT CHANGE UP (ref 80–100)
MCV RBC AUTO: 96.5 FL — SIGNIFICANT CHANGE UP (ref 80–100)
MCV RBC AUTO: 96.6 FL
MCV RBC AUTO: 96.6 FL — SIGNIFICANT CHANGE UP (ref 80–100)
MCV RBC AUTO: 96.7 FL — SIGNIFICANT CHANGE UP (ref 80–100)
MCV RBC AUTO: 96.8 FL — SIGNIFICANT CHANGE UP (ref 80–100)
MCV RBC AUTO: 96.8 FL — SIGNIFICANT CHANGE UP (ref 80–100)
MCV RBC AUTO: 97 FL — SIGNIFICANT CHANGE UP (ref 80–100)
MCV RBC AUTO: 97.1 FL
MCV RBC AUTO: 97.1 FL — SIGNIFICANT CHANGE UP (ref 80–100)
MCV RBC AUTO: 97.2 FL — SIGNIFICANT CHANGE UP (ref 80–100)
MCV RBC AUTO: 97.3 FL — SIGNIFICANT CHANGE UP (ref 80–100)
MCV RBC AUTO: 97.4 FL — SIGNIFICANT CHANGE UP (ref 80–100)
MCV RBC AUTO: 97.5 FL — SIGNIFICANT CHANGE UP (ref 80–100)
MCV RBC AUTO: 98.2 FL — SIGNIFICANT CHANGE UP (ref 80–100)
MCV RBC AUTO: 98.2 FL — SIGNIFICANT CHANGE UP (ref 80–100)
MCV RBC AUTO: 98.8 FL — SIGNIFICANT CHANGE UP (ref 80–100)
MCV RBC AUTO: 98.9 FL — SIGNIFICANT CHANGE UP (ref 80–100)
MCV RBC AUTO: 98.9 FL — SIGNIFICANT CHANGE UP (ref 80–100)
MCV RBC AUTO: 99.7 FL — SIGNIFICANT CHANGE UP (ref 80–100)
MESOTHL CELL # FLD: 2 % — SIGNIFICANT CHANGE UP
METAMYELOCYTES # FLD: 0.9 % — HIGH (ref 0–0)
METAMYELOCYTES # FLD: 0.9 % — HIGH (ref 0–0)
METAMYELOCYTES # FLD: 2 % — HIGH (ref 0–0)
METAMYELOCYTES # FLD: 3.3 % — HIGH (ref 0–0)
METHOD TYPE: SIGNIFICANT CHANGE UP
METHYLMALONATE SERPL-SCNC: 410 NMOL/L — HIGH (ref 0–378)
MICROCYTES BLD QL: SLIGHT — SIGNIFICANT CHANGE UP
MICROSCOPIC-UA: NORMAL
MONOCYTES # BLD AUTO: 0.07 K/UL — SIGNIFICANT CHANGE UP (ref 0–0.9)
MONOCYTES # BLD AUTO: 0.09 K/UL — SIGNIFICANT CHANGE UP (ref 0–0.9)
MONOCYTES # BLD AUTO: 0.12 K/UL — SIGNIFICANT CHANGE UP (ref 0–0.9)
MONOCYTES # BLD AUTO: 0.12 K/UL — SIGNIFICANT CHANGE UP (ref 0–0.9)
MONOCYTES # BLD AUTO: 0.16 K/UL — SIGNIFICANT CHANGE UP (ref 0–0.9)
MONOCYTES # BLD AUTO: 0.2 K/UL — SIGNIFICANT CHANGE UP (ref 0–0.9)
MONOCYTES # BLD AUTO: 0.21 K/UL — SIGNIFICANT CHANGE UP (ref 0–0.9)
MONOCYTES # BLD AUTO: 0.26 K/UL — SIGNIFICANT CHANGE UP (ref 0–0.9)
MONOCYTES # BLD AUTO: 0.32 K/UL — SIGNIFICANT CHANGE UP (ref 0–0.9)
MONOCYTES # BLD AUTO: 0.34 K/UL — SIGNIFICANT CHANGE UP (ref 0–0.9)
MONOCYTES # BLD AUTO: 0.36 K/UL — SIGNIFICANT CHANGE UP (ref 0–0.9)
MONOCYTES # BLD AUTO: 0.37 K/UL — SIGNIFICANT CHANGE UP (ref 0–0.9)
MONOCYTES # BLD AUTO: 0.38 K/UL — SIGNIFICANT CHANGE UP (ref 0–0.9)
MONOCYTES # BLD AUTO: 0.39 K/UL — SIGNIFICANT CHANGE UP (ref 0–0.9)
MONOCYTES # BLD AUTO: 0.4 K/UL — SIGNIFICANT CHANGE UP (ref 0–0.9)
MONOCYTES # BLD AUTO: 0.4 K/UL — SIGNIFICANT CHANGE UP (ref 0–0.9)
MONOCYTES # BLD AUTO: 0.41 K/UL — SIGNIFICANT CHANGE UP (ref 0–0.9)
MONOCYTES # BLD AUTO: 0.42 K/UL — SIGNIFICANT CHANGE UP (ref 0–0.9)
MONOCYTES # BLD AUTO: 0.43 K/UL — SIGNIFICANT CHANGE UP (ref 0–0.9)
MONOCYTES # BLD AUTO: 0.44 K/UL
MONOCYTES # BLD AUTO: 0.44 K/UL — SIGNIFICANT CHANGE UP (ref 0–0.9)
MONOCYTES # BLD AUTO: 0.45 K/UL — SIGNIFICANT CHANGE UP (ref 0–0.9)
MONOCYTES # BLD AUTO: 0.46 K/UL — SIGNIFICANT CHANGE UP (ref 0–0.9)
MONOCYTES # BLD AUTO: 0.48 K/UL — SIGNIFICANT CHANGE UP (ref 0–0.9)
MONOCYTES # BLD AUTO: 0.49 K/UL — SIGNIFICANT CHANGE UP (ref 0–0.9)
MONOCYTES # BLD AUTO: 0.51 K/UL — SIGNIFICANT CHANGE UP (ref 0–0.9)
MONOCYTES # BLD AUTO: 0.51 K/UL — SIGNIFICANT CHANGE UP (ref 0–0.9)
MONOCYTES # BLD AUTO: 0.52 K/UL — SIGNIFICANT CHANGE UP (ref 0–0.9)
MONOCYTES # BLD AUTO: 0.52 K/UL — SIGNIFICANT CHANGE UP (ref 0–0.9)
MONOCYTES # BLD AUTO: 0.53 K/UL — SIGNIFICANT CHANGE UP (ref 0–0.9)
MONOCYTES # BLD AUTO: 0.54 K/UL — SIGNIFICANT CHANGE UP (ref 0–0.9)
MONOCYTES # BLD AUTO: 0.54 K/UL — SIGNIFICANT CHANGE UP (ref 0–0.9)
MONOCYTES # BLD AUTO: 0.55 K/UL — SIGNIFICANT CHANGE UP (ref 0–0.9)
MONOCYTES # BLD AUTO: 0.56 K/UL — SIGNIFICANT CHANGE UP (ref 0–0.9)
MONOCYTES # BLD AUTO: 0.58 K/UL — SIGNIFICANT CHANGE UP (ref 0–0.9)
MONOCYTES # BLD AUTO: 0.59 K/UL — SIGNIFICANT CHANGE UP (ref 0–0.9)
MONOCYTES # BLD AUTO: 0.59 K/UL — SIGNIFICANT CHANGE UP (ref 0–0.9)
MONOCYTES # BLD AUTO: 0.61 K/UL — SIGNIFICANT CHANGE UP (ref 0–0.9)
MONOCYTES # BLD AUTO: 0.63 K/UL — SIGNIFICANT CHANGE UP (ref 0–0.9)
MONOCYTES # BLD AUTO: 0.64 K/UL — SIGNIFICANT CHANGE UP (ref 0–0.9)
MONOCYTES # BLD AUTO: 0.65 K/UL — SIGNIFICANT CHANGE UP (ref 0–0.9)
MONOCYTES # BLD AUTO: 0.66 K/UL
MONOCYTES # BLD AUTO: 0.7 K/UL — SIGNIFICANT CHANGE UP (ref 0–0.9)
MONOCYTES # BLD AUTO: 0.71 K/UL — SIGNIFICANT CHANGE UP (ref 0–0.9)
MONOCYTES # BLD AUTO: 0.71 K/UL — SIGNIFICANT CHANGE UP (ref 0–0.9)
MONOCYTES # BLD AUTO: 0.73 K/UL — SIGNIFICANT CHANGE UP (ref 0–0.9)
MONOCYTES # BLD AUTO: 0.75 K/UL — SIGNIFICANT CHANGE UP (ref 0–0.9)
MONOCYTES # BLD AUTO: 0.77 K/UL — SIGNIFICANT CHANGE UP (ref 0–0.9)
MONOCYTES # BLD AUTO: 0.82 K/UL
MONOCYTES # BLD AUTO: 0.82 K/UL — SIGNIFICANT CHANGE UP (ref 0–0.9)
MONOCYTES # BLD AUTO: 0.89 K/UL — SIGNIFICANT CHANGE UP (ref 0–0.9)
MONOCYTES # BLD AUTO: 1.09 K/UL — HIGH (ref 0–0.9)
MONOCYTES # BLD AUTO: 1.13 K/UL — HIGH (ref 0–0.9)
MONOCYTES # BLD AUTO: 1.28 K/UL — HIGH (ref 0–0.9)
MONOCYTES # BLD AUTO: 1.35 K/UL — HIGH (ref 0–0.9)
MONOCYTES # BLD AUTO: 1.38 K/UL — HIGH (ref 0–0.9)
MONOCYTES # BLD AUTO: 1.43 K/UL — HIGH (ref 0–0.9)
MONOCYTES # BLD AUTO: 1.45 K/UL — HIGH (ref 0–0.9)
MONOCYTES # BLD AUTO: 1.56 K/UL — HIGH (ref 0–0.9)
MONOCYTES # BLD AUTO: 1.66 K/UL — HIGH (ref 0–0.9)
MONOCYTES # BLD AUTO: 2.21 K/UL — HIGH (ref 0–0.9)
MONOCYTES NFR BLD AUTO: 1.7 % — LOW (ref 2–14)
MONOCYTES NFR BLD AUTO: 1.8 % — LOW (ref 2–14)
MONOCYTES NFR BLD AUTO: 10.2 %
MONOCYTES NFR BLD AUTO: 10.6 % — SIGNIFICANT CHANGE UP (ref 2–14)
MONOCYTES NFR BLD AUTO: 11 % — SIGNIFICANT CHANGE UP (ref 2–14)
MONOCYTES NFR BLD AUTO: 11.3 % — SIGNIFICANT CHANGE UP (ref 2–14)
MONOCYTES NFR BLD AUTO: 11.4 % — SIGNIFICANT CHANGE UP (ref 2–14)
MONOCYTES NFR BLD AUTO: 12.1 % — SIGNIFICANT CHANGE UP (ref 2–14)
MONOCYTES NFR BLD AUTO: 14.8 % — HIGH (ref 2–14)
MONOCYTES NFR BLD AUTO: 15.2 % — HIGH (ref 2–14)
MONOCYTES NFR BLD AUTO: 15.7 % — HIGH (ref 2–14)
MONOCYTES NFR BLD AUTO: 16.3 % — HIGH (ref 2–14)
MONOCYTES NFR BLD AUTO: 2.6 % — SIGNIFICANT CHANGE UP (ref 2–14)
MONOCYTES NFR BLD AUTO: 2.6 % — SIGNIFICANT CHANGE UP (ref 2–14)
MONOCYTES NFR BLD AUTO: 22 % — HIGH (ref 2–14)
MONOCYTES NFR BLD AUTO: 22.1 % — HIGH (ref 2–14)
MONOCYTES NFR BLD AUTO: 29.3 % — HIGH (ref 2–14)
MONOCYTES NFR BLD AUTO: 3.2 % — SIGNIFICANT CHANGE UP (ref 2–14)
MONOCYTES NFR BLD AUTO: 3.5 % — SIGNIFICANT CHANGE UP (ref 2–14)
MONOCYTES NFR BLD AUTO: 3.5 % — SIGNIFICANT CHANGE UP (ref 2–14)
MONOCYTES NFR BLD AUTO: 3.8 % — SIGNIFICANT CHANGE UP (ref 2–14)
MONOCYTES NFR BLD AUTO: 4.4 % — SIGNIFICANT CHANGE UP (ref 2–14)
MONOCYTES NFR BLD AUTO: 4.6 % — SIGNIFICANT CHANGE UP (ref 2–14)
MONOCYTES NFR BLD AUTO: 4.8 % — SIGNIFICANT CHANGE UP (ref 2–14)
MONOCYTES NFR BLD AUTO: 4.8 % — SIGNIFICANT CHANGE UP (ref 2–14)
MONOCYTES NFR BLD AUTO: 48.7 % — HIGH (ref 2–14)
MONOCYTES NFR BLD AUTO: 5.1 % — SIGNIFICANT CHANGE UP (ref 2–14)
MONOCYTES NFR BLD AUTO: 5.1 % — SIGNIFICANT CHANGE UP (ref 2–14)
MONOCYTES NFR BLD AUTO: 5.2 % — SIGNIFICANT CHANGE UP (ref 2–14)
MONOCYTES NFR BLD AUTO: 5.3 % — SIGNIFICANT CHANGE UP (ref 2–14)
MONOCYTES NFR BLD AUTO: 5.5 % — SIGNIFICANT CHANGE UP (ref 2–14)
MONOCYTES NFR BLD AUTO: 5.9 %
MONOCYTES NFR BLD AUTO: 6 % — SIGNIFICANT CHANGE UP (ref 2–14)
MONOCYTES NFR BLD AUTO: 6 % — SIGNIFICANT CHANGE UP (ref 2–14)
MONOCYTES NFR BLD AUTO: 6.1 % — SIGNIFICANT CHANGE UP (ref 2–14)
MONOCYTES NFR BLD AUTO: 6.1 % — SIGNIFICANT CHANGE UP (ref 2–14)
MONOCYTES NFR BLD AUTO: 6.2 % — SIGNIFICANT CHANGE UP (ref 2–14)
MONOCYTES NFR BLD AUTO: 6.4 % — SIGNIFICANT CHANGE UP (ref 2–14)
MONOCYTES NFR BLD AUTO: 6.5 % — SIGNIFICANT CHANGE UP (ref 2–14)
MONOCYTES NFR BLD AUTO: 6.9 % — SIGNIFICANT CHANGE UP (ref 2–14)
MONOCYTES NFR BLD AUTO: 6.9 % — SIGNIFICANT CHANGE UP (ref 2–14)
MONOCYTES NFR BLD AUTO: 7 % — SIGNIFICANT CHANGE UP (ref 2–14)
MONOCYTES NFR BLD AUTO: 7 % — SIGNIFICANT CHANGE UP (ref 2–14)
MONOCYTES NFR BLD AUTO: 7.1 %
MONOCYTES NFR BLD AUTO: 7.1 % — SIGNIFICANT CHANGE UP (ref 2–14)
MONOCYTES NFR BLD AUTO: 7.1 % — SIGNIFICANT CHANGE UP (ref 2–14)
MONOCYTES NFR BLD AUTO: 7.2 % — SIGNIFICANT CHANGE UP (ref 2–14)
MONOCYTES NFR BLD AUTO: 7.4 % — SIGNIFICANT CHANGE UP (ref 2–14)
MONOCYTES NFR BLD AUTO: 7.5 % — SIGNIFICANT CHANGE UP (ref 2–14)
MONOCYTES NFR BLD AUTO: 7.6 % — SIGNIFICANT CHANGE UP (ref 2–14)
MONOCYTES NFR BLD AUTO: 7.7 % — SIGNIFICANT CHANGE UP (ref 2–14)
MONOCYTES NFR BLD AUTO: 7.9 % — SIGNIFICANT CHANGE UP (ref 2–14)
MONOCYTES NFR BLD AUTO: 8.1 % — SIGNIFICANT CHANGE UP (ref 2–14)
MONOCYTES NFR BLD AUTO: 8.1 % — SIGNIFICANT CHANGE UP (ref 2–14)
MONOCYTES NFR BLD AUTO: 8.6 % — SIGNIFICANT CHANGE UP (ref 2–14)
MONOCYTES NFR BLD AUTO: 8.8 % — SIGNIFICANT CHANGE UP (ref 2–14)
MONOCYTES NFR BLD AUTO: 9 % — SIGNIFICANT CHANGE UP (ref 2–14)
MONOCYTES NFR BLD AUTO: 9.3 % — SIGNIFICANT CHANGE UP (ref 2–14)
MONOCYTES NFR BLD AUTO: 9.4 % — SIGNIFICANT CHANGE UP (ref 2–14)
MONOCYTES NFR BLD AUTO: 9.6 % — SIGNIFICANT CHANGE UP (ref 2–14)
MONOCYTES NFR BLD AUTO: 9.7 % — SIGNIFICANT CHANGE UP (ref 2–14)
MONOCYTES NFR BLD AUTO: 9.8 % — SIGNIFICANT CHANGE UP (ref 2–14)
MONOCYTES NFR BLD AUTO: 9.9 % — SIGNIFICANT CHANGE UP (ref 2–14)
MONOCYTES NFR BLD AUTO: 9.9 % — SIGNIFICANT CHANGE UP (ref 2–14)
MONOS+MACROS # FLD: 16 % — SIGNIFICANT CHANGE UP
MONOS+MACROS NFR CSF: 21 % — SIGNIFICANT CHANGE UP (ref 15–45)
MRSA PCR RESULT.: SIGNIFICANT CHANGE UP
MYELOCYTES NFR BLD: 0.9 % — HIGH (ref 0–0)
MYELOCYTES NFR BLD: 1 % — HIGH (ref 0–0)
NEUTROPHILS # BLD AUTO: 1.29 K/UL — LOW (ref 1.8–7.4)
NEUTROPHILS # BLD AUTO: 1.32 K/UL — LOW (ref 1.8–7.4)
NEUTROPHILS # BLD AUTO: 1.44 K/UL — LOW (ref 1.8–7.4)
NEUTROPHILS # BLD AUTO: 1.56 K/UL — LOW (ref 1.8–7.4)
NEUTROPHILS # BLD AUTO: 1.72 K/UL — LOW (ref 1.8–7.4)
NEUTROPHILS # BLD AUTO: 1.76 K/UL — LOW (ref 1.8–7.4)
NEUTROPHILS # BLD AUTO: 1.85 K/UL — SIGNIFICANT CHANGE UP (ref 1.8–7.4)
NEUTROPHILS # BLD AUTO: 10.26 K/UL
NEUTROPHILS # BLD AUTO: 11.07 K/UL — HIGH (ref 1.8–7.4)
NEUTROPHILS # BLD AUTO: 11.35 K/UL — HIGH (ref 1.8–7.4)
NEUTROPHILS # BLD AUTO: 11.58 K/UL — HIGH (ref 1.8–7.4)
NEUTROPHILS # BLD AUTO: 12.21 K/UL — HIGH (ref 1.8–7.4)
NEUTROPHILS # BLD AUTO: 13.7 K/UL — HIGH (ref 1.8–7.4)
NEUTROPHILS # BLD AUTO: 14.54 K/UL — HIGH (ref 1.8–7.4)
NEUTROPHILS # BLD AUTO: 14.54 K/UL — HIGH (ref 1.8–7.4)
NEUTROPHILS # BLD AUTO: 15.13 K/UL — HIGH (ref 1.8–7.4)
NEUTROPHILS # BLD AUTO: 15.55 K/UL — HIGH (ref 1.8–7.4)
NEUTROPHILS # BLD AUTO: 16.3 K/UL — HIGH (ref 1.8–7.4)
NEUTROPHILS # BLD AUTO: 16.74 K/UL — HIGH (ref 1.8–7.4)
NEUTROPHILS # BLD AUTO: 17.99 K/UL — HIGH (ref 1.8–7.4)
NEUTROPHILS # BLD AUTO: 2.14 K/UL — SIGNIFICANT CHANGE UP (ref 1.8–7.4)
NEUTROPHILS # BLD AUTO: 2.23 K/UL — SIGNIFICANT CHANGE UP (ref 1.8–7.4)
NEUTROPHILS # BLD AUTO: 2.39 K/UL — SIGNIFICANT CHANGE UP (ref 1.8–7.4)
NEUTROPHILS # BLD AUTO: 2.56 K/UL — SIGNIFICANT CHANGE UP (ref 1.8–7.4)
NEUTROPHILS # BLD AUTO: 2.74 K/UL — SIGNIFICANT CHANGE UP (ref 1.8–7.4)
NEUTROPHILS # BLD AUTO: 2.77 K/UL — SIGNIFICANT CHANGE UP (ref 1.8–7.4)
NEUTROPHILS # BLD AUTO: 20.33 K/UL — HIGH (ref 1.8–7.4)
NEUTROPHILS # BLD AUTO: 26.56 K/UL — HIGH (ref 1.8–7.4)
NEUTROPHILS # BLD AUTO: 3.15 K/UL — SIGNIFICANT CHANGE UP (ref 1.8–7.4)
NEUTROPHILS # BLD AUTO: 3.26 K/UL — SIGNIFICANT CHANGE UP (ref 1.8–7.4)
NEUTROPHILS # BLD AUTO: 3.27 K/UL — SIGNIFICANT CHANGE UP (ref 1.8–7.4)
NEUTROPHILS # BLD AUTO: 3.42 K/UL — SIGNIFICANT CHANGE UP (ref 1.8–7.4)
NEUTROPHILS # BLD AUTO: 3.65 K/UL — SIGNIFICANT CHANGE UP (ref 1.8–7.4)
NEUTROPHILS # BLD AUTO: 3.75 K/UL — SIGNIFICANT CHANGE UP (ref 1.8–7.4)
NEUTROPHILS # BLD AUTO: 4.01 K/UL — SIGNIFICANT CHANGE UP (ref 1.8–7.4)
NEUTROPHILS # BLD AUTO: 4.06 K/UL — SIGNIFICANT CHANGE UP (ref 1.8–7.4)
NEUTROPHILS # BLD AUTO: 4.25 K/UL — SIGNIFICANT CHANGE UP (ref 1.8–7.4)
NEUTROPHILS # BLD AUTO: 4.39 K/UL — SIGNIFICANT CHANGE UP (ref 1.8–7.4)
NEUTROPHILS # BLD AUTO: 4.53 K/UL — SIGNIFICANT CHANGE UP (ref 1.8–7.4)
NEUTROPHILS # BLD AUTO: 4.6 K/UL — SIGNIFICANT CHANGE UP (ref 1.8–7.4)
NEUTROPHILS # BLD AUTO: 4.65 K/UL — SIGNIFICANT CHANGE UP (ref 1.8–7.4)
NEUTROPHILS # BLD AUTO: 4.74 K/UL — SIGNIFICANT CHANGE UP (ref 1.8–7.4)
NEUTROPHILS # BLD AUTO: 4.81 K/UL — SIGNIFICANT CHANGE UP (ref 1.8–7.4)
NEUTROPHILS # BLD AUTO: 4.94 K/UL — SIGNIFICANT CHANGE UP (ref 1.8–7.4)
NEUTROPHILS # BLD AUTO: 5.09 K/UL — SIGNIFICANT CHANGE UP (ref 1.8–7.4)
NEUTROPHILS # BLD AUTO: 5.09 K/UL — SIGNIFICANT CHANGE UP (ref 1.8–7.4)
NEUTROPHILS # BLD AUTO: 5.22 K/UL — SIGNIFICANT CHANGE UP (ref 1.8–7.4)
NEUTROPHILS # BLD AUTO: 5.26 K/UL
NEUTROPHILS # BLD AUTO: 5.48 K/UL — SIGNIFICANT CHANGE UP (ref 1.8–7.4)
NEUTROPHILS # BLD AUTO: 5.49 K/UL — SIGNIFICANT CHANGE UP (ref 1.8–7.4)
NEUTROPHILS # BLD AUTO: 5.71 K/UL — SIGNIFICANT CHANGE UP (ref 1.8–7.4)
NEUTROPHILS # BLD AUTO: 5.87 K/UL — SIGNIFICANT CHANGE UP (ref 1.8–7.4)
NEUTROPHILS # BLD AUTO: 6.02 K/UL — SIGNIFICANT CHANGE UP (ref 1.8–7.4)
NEUTROPHILS # BLD AUTO: 6.06 K/UL — SIGNIFICANT CHANGE UP (ref 1.8–7.4)
NEUTROPHILS # BLD AUTO: 6.1 K/UL — SIGNIFICANT CHANGE UP (ref 1.8–7.4)
NEUTROPHILS # BLD AUTO: 6.19 K/UL — SIGNIFICANT CHANGE UP (ref 1.8–7.4)
NEUTROPHILS # BLD AUTO: 6.52 K/UL — SIGNIFICANT CHANGE UP (ref 1.8–7.4)
NEUTROPHILS # BLD AUTO: 6.77 K/UL
NEUTROPHILS # BLD AUTO: 6.8 K/UL — SIGNIFICANT CHANGE UP (ref 1.8–7.4)
NEUTROPHILS # BLD AUTO: 6.85 K/UL — SIGNIFICANT CHANGE UP (ref 1.8–7.4)
NEUTROPHILS # BLD AUTO: 7.19 K/UL — SIGNIFICANT CHANGE UP (ref 1.8–7.4)
NEUTROPHILS # BLD AUTO: 7.22 K/UL — SIGNIFICANT CHANGE UP (ref 1.8–7.4)
NEUTROPHILS # BLD AUTO: 7.34 K/UL — SIGNIFICANT CHANGE UP (ref 1.8–7.4)
NEUTROPHILS # BLD AUTO: 8.12 K/UL — HIGH (ref 1.8–7.4)
NEUTROPHILS # BLD AUTO: 8.76 K/UL — HIGH (ref 1.8–7.4)
NEUTROPHILS # BLD AUTO: 8.8 K/UL — HIGH (ref 1.8–7.4)
NEUTROPHILS # BLD AUTO: 8.82 K/UL — HIGH (ref 1.8–7.4)
NEUTROPHILS # BLD AUTO: 9.98 K/UL — HIGH (ref 1.8–7.4)
NEUTROPHILS # CSF: 21 % — HIGH (ref 0–6)
NEUTROPHILS NFR BLD AUTO: 42.6 % — LOW (ref 43–77)
NEUTROPHILS NFR BLD AUTO: 50.4 % — SIGNIFICANT CHANGE UP (ref 43–77)
NEUTROPHILS NFR BLD AUTO: 51.5 % — SIGNIFICANT CHANGE UP (ref 43–77)
NEUTROPHILS NFR BLD AUTO: 54 % — SIGNIFICANT CHANGE UP (ref 43–77)
NEUTROPHILS NFR BLD AUTO: 55.3 % — SIGNIFICANT CHANGE UP (ref 43–77)
NEUTROPHILS NFR BLD AUTO: 58 % — SIGNIFICANT CHANGE UP (ref 43–77)
NEUTROPHILS NFR BLD AUTO: 59.3 % — SIGNIFICANT CHANGE UP (ref 43–77)
NEUTROPHILS NFR BLD AUTO: 60.1 % — SIGNIFICANT CHANGE UP (ref 43–77)
NEUTROPHILS NFR BLD AUTO: 61.1 % — SIGNIFICANT CHANGE UP (ref 43–77)
NEUTROPHILS NFR BLD AUTO: 66.7 % — SIGNIFICANT CHANGE UP (ref 43–77)
NEUTROPHILS NFR BLD AUTO: 70.3 % — SIGNIFICANT CHANGE UP (ref 43–77)
NEUTROPHILS NFR BLD AUTO: 71 % — SIGNIFICANT CHANGE UP (ref 43–77)
NEUTROPHILS NFR BLD AUTO: 71.3 % — SIGNIFICANT CHANGE UP (ref 43–77)
NEUTROPHILS NFR BLD AUTO: 72.5 % — SIGNIFICANT CHANGE UP (ref 43–77)
NEUTROPHILS NFR BLD AUTO: 72.6 % — SIGNIFICANT CHANGE UP (ref 43–77)
NEUTROPHILS NFR BLD AUTO: 72.8 % — SIGNIFICANT CHANGE UP (ref 43–77)
NEUTROPHILS NFR BLD AUTO: 73.8 % — SIGNIFICANT CHANGE UP (ref 43–77)
NEUTROPHILS NFR BLD AUTO: 74.6 % — SIGNIFICANT CHANGE UP (ref 43–77)
NEUTROPHILS NFR BLD AUTO: 75.2 % — SIGNIFICANT CHANGE UP (ref 43–77)
NEUTROPHILS NFR BLD AUTO: 75.5 % — SIGNIFICANT CHANGE UP (ref 43–77)
NEUTROPHILS NFR BLD AUTO: 76.8 % — SIGNIFICANT CHANGE UP (ref 43–77)
NEUTROPHILS NFR BLD AUTO: 76.9 % — SIGNIFICANT CHANGE UP (ref 43–77)
NEUTROPHILS NFR BLD AUTO: 77 % — SIGNIFICANT CHANGE UP (ref 43–77)
NEUTROPHILS NFR BLD AUTO: 77.5 % — HIGH (ref 43–77)
NEUTROPHILS NFR BLD AUTO: 78.4 % — HIGH (ref 43–77)
NEUTROPHILS NFR BLD AUTO: 78.9 % — HIGH (ref 43–77)
NEUTROPHILS NFR BLD AUTO: 79.1 % — HIGH (ref 43–77)
NEUTROPHILS NFR BLD AUTO: 80.3 % — HIGH (ref 43–77)
NEUTROPHILS NFR BLD AUTO: 80.3 % — HIGH (ref 43–77)
NEUTROPHILS NFR BLD AUTO: 80.7 % — HIGH (ref 43–77)
NEUTROPHILS NFR BLD AUTO: 80.8 % — HIGH (ref 43–77)
NEUTROPHILS NFR BLD AUTO: 81 % — HIGH (ref 43–77)
NEUTROPHILS NFR BLD AUTO: 81 % — HIGH (ref 43–77)
NEUTROPHILS NFR BLD AUTO: 81.2 % — HIGH (ref 43–77)
NEUTROPHILS NFR BLD AUTO: 81.5 %
NEUTROPHILS NFR BLD AUTO: 81.6 % — HIGH (ref 43–77)
NEUTROPHILS NFR BLD AUTO: 82 % — HIGH (ref 43–77)
NEUTROPHILS NFR BLD AUTO: 82.1 % — HIGH (ref 43–77)
NEUTROPHILS NFR BLD AUTO: 82.3 % — HIGH (ref 43–77)
NEUTROPHILS NFR BLD AUTO: 82.4 % — HIGH (ref 43–77)
NEUTROPHILS NFR BLD AUTO: 83.1 % — HIGH (ref 43–77)
NEUTROPHILS NFR BLD AUTO: 83.2 % — HIGH (ref 43–77)
NEUTROPHILS NFR BLD AUTO: 83.3 % — HIGH (ref 43–77)
NEUTROPHILS NFR BLD AUTO: 83.5 % — HIGH (ref 43–77)
NEUTROPHILS NFR BLD AUTO: 83.9 % — HIGH (ref 43–77)
NEUTROPHILS NFR BLD AUTO: 84.1 % — HIGH (ref 43–77)
NEUTROPHILS NFR BLD AUTO: 84.2 % — HIGH (ref 43–77)
NEUTROPHILS NFR BLD AUTO: 84.3 % — HIGH (ref 43–77)
NEUTROPHILS NFR BLD AUTO: 84.9 % — HIGH (ref 43–77)
NEUTROPHILS NFR BLD AUTO: 85.3 % — HIGH (ref 43–77)
NEUTROPHILS NFR BLD AUTO: 85.3 % — HIGH (ref 43–77)
NEUTROPHILS NFR BLD AUTO: 86.2 % — HIGH (ref 43–77)
NEUTROPHILS NFR BLD AUTO: 86.8 % — HIGH (ref 43–77)
NEUTROPHILS NFR BLD AUTO: 86.9 % — HIGH (ref 43–77)
NEUTROPHILS NFR BLD AUTO: 87.3 % — HIGH (ref 43–77)
NEUTROPHILS NFR BLD AUTO: 87.3 % — HIGH (ref 43–77)
NEUTROPHILS NFR BLD AUTO: 87.6 % — HIGH (ref 43–77)
NEUTROPHILS NFR BLD AUTO: 88.7 %
NEUTROPHILS NFR BLD AUTO: 88.8 % — HIGH (ref 43–77)
NEUTROPHILS NFR BLD AUTO: 88.9 % — HIGH (ref 43–77)
NEUTROPHILS NFR BLD AUTO: 90.2 % — HIGH (ref 43–77)
NEUTROPHILS NFR BLD AUTO: 90.6 %
NEUTROPHILS NFR BLD AUTO: 91.1 % — HIGH (ref 43–77)
NEUTROPHILS NFR BLD AUTO: 93 % — HIGH (ref 43–77)
NEUTROPHILS NFR BLD AUTO: 93.1 % — HIGH (ref 43–77)
NEUTROPHILS NFR BLD AUTO: 93.2 % — HIGH (ref 43–77)
NEUTROPHILS NFR BLD AUTO: 93.6 % — HIGH (ref 43–77)
NEUTROPHILS NFR BLD AUTO: 94.9 % — HIGH (ref 43–77)
NEUTROPHILS-BODY FLUID: 50 % — SIGNIFICANT CHANGE UP
NEUTS BAND # BLD: 0.8 % — SIGNIFICANT CHANGE UP (ref 0–8)
NEUTS BAND # BLD: 0.9 % — SIGNIFICANT CHANGE UP (ref 0–8)
NEUTS BAND # BLD: 1.8 % — SIGNIFICANT CHANGE UP (ref 0–8)
NEUTS BAND # BLD: 1.8 % — SIGNIFICANT CHANGE UP (ref 0–8)
NEUTS BAND # BLD: 2 % — SIGNIFICANT CHANGE UP (ref 0–8)
NEUTS BAND # BLD: 3.5 % — SIGNIFICANT CHANGE UP (ref 0–8)
NEUTS BAND # BLD: 4.3 % — SIGNIFICANT CHANGE UP (ref 0–8)
NEUTS BAND # BLD: 6.2 % — SIGNIFICANT CHANGE UP (ref 0–8)
NEUTS BAND # BLD: 9.6 % — HIGH (ref 0–8)
NIGHT BLUE STAIN TISS: SIGNIFICANT CHANGE UP
NIGHT BLUE STAIN TISS: SIGNIFICANT CHANGE UP
NITRITE UR-MCNC: NEGATIVE — SIGNIFICANT CHANGE UP
NITRITE UR-MCNC: POSITIVE
NITRITE UR-MCNC: POSITIVE
NITRITE URINE: NEGATIVE
NON-GYNECOLOGICAL CYTOLOGY STUDY: SIGNIFICANT CHANGE UP
NRBC # BLD: 0 /100 WBCS — SIGNIFICANT CHANGE UP
NRBC # BLD: 0 /100 WBCS — SIGNIFICANT CHANGE UP (ref 0–0)
NRBC # BLD: 0 /100 — SIGNIFICANT CHANGE UP (ref 0–0)
NRBC # BLD: 1 /100 — HIGH (ref 0–0)
NRBC # FLD: 0 K/UL — SIGNIFICANT CHANGE UP
NRBC # FLD: 0 K/UL — SIGNIFICANT CHANGE UP (ref 0–0)
NRBC # FLD: 0 K/UL — SIGNIFICANT CHANGE UP (ref 0–0)
NRBC NFR CSF: 1 /UL — SIGNIFICANT CHANGE UP (ref 0–5)
NT-PROBNP SERPL-SCNC: HIGH PG/ML (ref 0–300)
OB PNL STL: NEGATIVE — SIGNIFICANT CHANGE UP
OB PNL STL: POSITIVE
ORGANISM # SPEC MICROSCOPIC CNT: SIGNIFICANT CHANGE UP
OSMOLALITY UR: 370 MOS/KG — SIGNIFICANT CHANGE UP (ref 300–900)
OSMOLALITY UR: 375 MOS/KG — SIGNIFICANT CHANGE UP (ref 300–900)
OSMOLALITY UR: 387 MOS/KG — SIGNIFICANT CHANGE UP (ref 300–900)
OVALOCYTES BLD QL SMEAR: SLIGHT — SIGNIFICANT CHANGE UP
PARATHYROID HORMONE INTACT: 74 PG/ML
PARATHYROID HORMONE INTACT: 91 PG/ML
PCO2 BLDV: 27 MMHG — LOW (ref 42–55)
PCO2 BLDV: 36 MMHG — LOW (ref 42–55)
PCO2 BLDV: 40 MMHG — LOW (ref 42–55)
PCO2 BLDV: 46 MMHG — SIGNIFICANT CHANGE UP (ref 42–55)
PCO2 BLDV: 46 MMHG — SIGNIFICANT CHANGE UP (ref 42–55)
PCO2 BLDV: 47 MMHG — SIGNIFICANT CHANGE UP (ref 42–55)
PCO2 BLDV: 48 MMHG — SIGNIFICANT CHANGE UP (ref 42–55)
PCO2 BLDV: 49 MMHG — SIGNIFICANT CHANGE UP (ref 42–55)
PCO2 BLDV: 54 MMHG — SIGNIFICANT CHANGE UP (ref 42–55)
PH BLDV: 7.26 — LOW (ref 7.32–7.43)
PH BLDV: 7.32 — SIGNIFICANT CHANGE UP (ref 7.32–7.43)
PH BLDV: 7.34 — SIGNIFICANT CHANGE UP (ref 7.32–7.43)
PH BLDV: 7.35 — SIGNIFICANT CHANGE UP (ref 7.32–7.43)
PH BLDV: 7.36 — SIGNIFICANT CHANGE UP (ref 7.32–7.43)
PH BLDV: 7.37 — SIGNIFICANT CHANGE UP (ref 7.32–7.43)
PH BLDV: 7.37 — SIGNIFICANT CHANGE UP (ref 7.32–7.43)
PH BLDV: 7.4 — SIGNIFICANT CHANGE UP (ref 7.32–7.43)
PH BLDV: 7.4 — SIGNIFICANT CHANGE UP (ref 7.32–7.43)
PH BLDV: 7.41 — SIGNIFICANT CHANGE UP (ref 7.32–7.43)
PH BLDV: 7.47 — HIGH (ref 7.32–7.43)
PH FLD: 7.52 — SIGNIFICANT CHANGE UP
PH UR: 5.5 — SIGNIFICANT CHANGE UP (ref 5–8)
PH UR: 6 — SIGNIFICANT CHANGE UP (ref 5–8)
PH UR: 7 — SIGNIFICANT CHANGE UP (ref 5–8)
PH UR: 7 — SIGNIFICANT CHANGE UP (ref 5–8)
PH UR: 8 — SIGNIFICANT CHANGE UP (ref 5–8)
PH UR: 8.5 — HIGH (ref 5–8)
PH URINE: 6
PHENYTOIN FREE SERPL-MCNC: 1.2 MG/L — SIGNIFICANT CHANGE UP (ref 1–2)
PHENYTOIN FREE SERPL-MCNC: 1.2 MG/L — SIGNIFICANT CHANGE UP (ref 1–2)
PHENYTOIN FREE SERPL-MCNC: 1.3 UG/ML — LOW (ref 10–20)
PHENYTOIN FREE SERPL-MCNC: 1.4 MG/L
PHENYTOIN FREE SERPL-MCNC: 1.6 MG/L — SIGNIFICANT CHANGE UP (ref 1–2)
PHENYTOIN FREE SERPL-MCNC: 1.7 UG/ML — LOW (ref 10–20)
PHENYTOIN FREE SERPL-MCNC: 11.1 UG/ML — SIGNIFICANT CHANGE UP (ref 10–20)
PHENYTOIN FREE SERPL-MCNC: 12.2 UG/ML — SIGNIFICANT CHANGE UP (ref 10–20)
PHENYTOIN FREE SERPL-MCNC: 12.5 UG/ML — SIGNIFICANT CHANGE UP (ref 10–20)
PHENYTOIN FREE SERPL-MCNC: 13.1 UG/ML — SIGNIFICANT CHANGE UP (ref 10–20)
PHENYTOIN FREE SERPL-MCNC: 14.7 UG/ML — SIGNIFICANT CHANGE UP (ref 10–20)
PHENYTOIN FREE SERPL-MCNC: 16.1 UG/ML — SIGNIFICANT CHANGE UP (ref 10–20)
PHENYTOIN FREE SERPL-MCNC: 16.7 UG/ML — SIGNIFICANT CHANGE UP (ref 10–20)
PHENYTOIN FREE SERPL-MCNC: 17.3 UG/ML — SIGNIFICANT CHANGE UP (ref 10–20)
PHENYTOIN FREE SERPL-MCNC: 17.3 UG/ML — SIGNIFICANT CHANGE UP (ref 10–20)
PHENYTOIN FREE SERPL-MCNC: 17.9 UG/ML — SIGNIFICANT CHANGE UP (ref 10–20)
PHENYTOIN FREE SERPL-MCNC: 18.3 UG/ML — SIGNIFICANT CHANGE UP (ref 10–20)
PHENYTOIN FREE SERPL-MCNC: 18.5 UG/ML — SIGNIFICANT CHANGE UP (ref 10–20)
PHENYTOIN FREE SERPL-MCNC: 19.1 UG/ML — SIGNIFICANT CHANGE UP (ref 10–20)
PHENYTOIN FREE SERPL-MCNC: 19.3 UG/ML — SIGNIFICANT CHANGE UP (ref 10–20)
PHENYTOIN FREE SERPL-MCNC: 2.2 UG/ML — LOW (ref 10–20)
PHENYTOIN FREE SERPL-MCNC: 2.5 UG/ML — LOW (ref 10–20)
PHENYTOIN FREE SERPL-MCNC: 2.8 UG/ML — LOW (ref 10–20)
PHENYTOIN FREE SERPL-MCNC: 2.8 UG/ML — LOW (ref 10–20)
PHENYTOIN FREE SERPL-MCNC: 2.9 UG/ML — LOW (ref 10–20)
PHENYTOIN FREE SERPL-MCNC: 2.9 UG/ML — LOW (ref 10–20)
PHENYTOIN FREE SERPL-MCNC: 20.1 UG/ML — HIGH (ref 10–20)
PHENYTOIN FREE SERPL-MCNC: 20.1 UG/ML — HIGH (ref 10–20)
PHENYTOIN FREE SERPL-MCNC: 20.3 UG/ML — HIGH (ref 10–20)
PHENYTOIN FREE SERPL-MCNC: 20.8 UG/ML — HIGH (ref 10–20)
PHENYTOIN FREE SERPL-MCNC: 3 UG/ML — LOW (ref 10–20)
PHENYTOIN FREE SERPL-MCNC: 3.1 UG/ML — LOW (ref 10–20)
PHENYTOIN FREE SERPL-MCNC: 3.2 UG/ML — LOW (ref 10–20)
PHENYTOIN FREE SERPL-MCNC: 3.3 MG/L — HIGH (ref 1–2)
PHENYTOIN FREE SERPL-MCNC: 3.3 UG/ML — LOW (ref 10–20)
PHENYTOIN FREE SERPL-MCNC: 3.3 UG/ML — LOW (ref 10–20)
PHENYTOIN FREE SERPL-MCNC: 3.4 UG/ML — LOW (ref 10–20)
PHENYTOIN FREE SERPL-MCNC: 3.4 UG/ML — LOW (ref 10–20)
PHENYTOIN FREE SERPL-MCNC: 3.5 UG/ML — LOW (ref 10–20)
PHENYTOIN FREE SERPL-MCNC: 3.5 UG/ML — LOW (ref 10–20)
PHENYTOIN FREE SERPL-MCNC: 3.6 UG/ML — LOW (ref 10–20)
PHENYTOIN FREE SERPL-MCNC: 3.6 UG/ML — LOW (ref 10–20)
PHENYTOIN FREE SERPL-MCNC: 3.7 UG/ML — LOW (ref 10–20)
PHENYTOIN FREE SERPL-MCNC: 4 UG/ML — LOW (ref 10–20)
PHENYTOIN FREE SERPL-MCNC: 4.1 UG/ML — LOW (ref 10–20)
PHENYTOIN FREE SERPL-MCNC: 4.1 UG/ML — LOW (ref 10–20)
PHENYTOIN FREE SERPL-MCNC: 4.2 UG/ML — LOW (ref 10–20)
PHENYTOIN FREE SERPL-MCNC: 4.3 UG/ML — LOW (ref 10–20)
PHENYTOIN FREE SERPL-MCNC: 4.4 UG/ML — LOW (ref 10–20)
PHENYTOIN FREE SERPL-MCNC: 4.6 UG/ML — LOW (ref 10–20)
PHENYTOIN FREE SERPL-MCNC: 4.8 UG/ML — LOW (ref 10–20)
PHENYTOIN FREE SERPL-MCNC: 4.8 UG/ML — LOW (ref 10–20)
PHENYTOIN FREE SERPL-MCNC: 5.1 UG/ML — LOW (ref 10–20)
PHENYTOIN FREE SERPL-MCNC: 5.2 UG/ML — LOW (ref 10–20)
PHENYTOIN FREE SERPL-MCNC: 5.4 UG/ML — LOW (ref 10–20)
PHENYTOIN FREE SERPL-MCNC: 5.5 UG/ML — LOW (ref 10–20)
PHENYTOIN FREE SERPL-MCNC: 5.6 UG/ML — LOW (ref 10–20)
PHENYTOIN FREE SERPL-MCNC: 6.4 UG/ML — LOW (ref 10–20)
PHENYTOIN FREE SERPL-MCNC: 6.9 UG/ML — LOW (ref 10–20)
PHENYTOIN FREE SERPL-MCNC: 8.9 UG/ML — LOW (ref 10–20)
PHENYTOIN SERPL QL: 6.7 UG/ML
PHOSPHATE SERPL-MCNC: 2.2 MG/DL — LOW (ref 2.5–4.5)
PHOSPHATE SERPL-MCNC: 2.2 MG/DL — LOW (ref 2.5–4.5)
PHOSPHATE SERPL-MCNC: 2.3 MG/DL — LOW (ref 2.5–4.5)
PHOSPHATE SERPL-MCNC: 2.4 MG/DL — LOW (ref 2.5–4.5)
PHOSPHATE SERPL-MCNC: 2.4 MG/DL — LOW (ref 2.5–4.5)
PHOSPHATE SERPL-MCNC: 2.5 MG/DL — SIGNIFICANT CHANGE UP (ref 2.5–4.5)
PHOSPHATE SERPL-MCNC: 2.6 MG/DL — SIGNIFICANT CHANGE UP (ref 2.5–4.5)
PHOSPHATE SERPL-MCNC: 2.7 MG/DL — SIGNIFICANT CHANGE UP (ref 2.5–4.5)
PHOSPHATE SERPL-MCNC: 2.7 MG/DL — SIGNIFICANT CHANGE UP (ref 2.5–4.5)
PHOSPHATE SERPL-MCNC: 2.8 MG/DL
PHOSPHATE SERPL-MCNC: 2.8 MG/DL — SIGNIFICANT CHANGE UP (ref 2.5–4.5)
PHOSPHATE SERPL-MCNC: 2.9 MG/DL — SIGNIFICANT CHANGE UP (ref 2.5–4.5)
PHOSPHATE SERPL-MCNC: 3 MG/DL
PHOSPHATE SERPL-MCNC: 3 MG/DL — SIGNIFICANT CHANGE UP (ref 2.5–4.5)
PHOSPHATE SERPL-MCNC: 3.1 MG/DL — SIGNIFICANT CHANGE UP (ref 2.5–4.5)
PHOSPHATE SERPL-MCNC: 3.2 MG/DL — SIGNIFICANT CHANGE UP (ref 2.5–4.5)
PHOSPHATE SERPL-MCNC: 3.3 MG/DL — SIGNIFICANT CHANGE UP (ref 2.5–4.5)
PHOSPHATE SERPL-MCNC: 3.4 MG/DL
PHOSPHATE SERPL-MCNC: 3.4 MG/DL — SIGNIFICANT CHANGE UP (ref 2.5–4.5)
PHOSPHATE SERPL-MCNC: 3.5 MG/DL — SIGNIFICANT CHANGE UP (ref 2.5–4.5)
PHOSPHATE SERPL-MCNC: 3.6 MG/DL — SIGNIFICANT CHANGE UP (ref 2.5–4.5)
PHOSPHATE SERPL-MCNC: 3.7 MG/DL — SIGNIFICANT CHANGE UP (ref 2.5–4.5)
PHOSPHATE SERPL-MCNC: 3.8 MG/DL — SIGNIFICANT CHANGE UP (ref 2.5–4.5)
PHOSPHATE SERPL-MCNC: 3.9 MG/DL — SIGNIFICANT CHANGE UP (ref 2.5–4.5)
PHOSPHATE SERPL-MCNC: 4 MG/DL — SIGNIFICANT CHANGE UP (ref 2.5–4.5)
PHOSPHATE SERPL-MCNC: 4.1 MG/DL — SIGNIFICANT CHANGE UP (ref 2.5–4.5)
PHOSPHATE SERPL-MCNC: 4.2 MG/DL — SIGNIFICANT CHANGE UP (ref 2.5–4.5)
PHOSPHATE SERPL-MCNC: 4.3 MG/DL — SIGNIFICANT CHANGE UP (ref 2.5–4.5)
PHOSPHATE SERPL-MCNC: 4.4 MG/DL — SIGNIFICANT CHANGE UP (ref 2.5–4.5)
PHOSPHATE SERPL-MCNC: 4.6 MG/DL — HIGH (ref 2.5–4.5)
PHOSPHATE SERPL-MCNC: 4.7 MG/DL — HIGH (ref 2.5–4.5)
PHOSPHATE SERPL-MCNC: 4.8 MG/DL — HIGH (ref 2.5–4.5)
PHOSPHATE SERPL-MCNC: 4.9 MG/DL — HIGH (ref 2.5–4.5)
PHOSPHATE SERPL-MCNC: 4.9 MG/DL — HIGH (ref 2.5–4.5)
PHOSPHATE SERPL-MCNC: 5 MG/DL — HIGH (ref 2.5–4.5)
PHOSPHATE SERPL-MCNC: 5 MG/DL — HIGH (ref 2.5–4.5)
PHOSPHATE SERPL-MCNC: 5.1 MG/DL — HIGH (ref 2.5–4.5)
PHOSPHATE SERPL-MCNC: 5.1 MG/DL — HIGH (ref 2.5–4.5)
PHOSPHATE SERPL-MCNC: 5.2 MG/DL — HIGH (ref 2.5–4.5)
PHOSPHATE SERPL-MCNC: 5.3 MG/DL — HIGH (ref 2.5–4.5)
PHOSPHATE SERPL-MCNC: 5.5 MG/DL — HIGH (ref 2.5–4.5)
PHOSPHATE SERPL-MCNC: 5.5 MG/DL — HIGH (ref 2.5–4.5)
PHOSPHATE SERPL-MCNC: 5.6 MG/DL — HIGH (ref 2.5–4.5)
PHOSPHATE SERPL-MCNC: 5.6 MG/DL — HIGH (ref 2.5–4.5)
PHOSPHATE SERPL-MCNC: 5.7 MG/DL — HIGH (ref 2.5–4.5)
PHOSPHATE SERPL-MCNC: 5.7 MG/DL — HIGH (ref 2.5–4.5)
PHOSPHATE SERPL-MCNC: 5.9 MG/DL — HIGH (ref 2.5–4.5)
PHOSPHATE SERPL-MCNC: 5.9 MG/DL — HIGH (ref 2.5–4.5)
PHOSPHATE SERPL-MCNC: 6 MG/DL — HIGH (ref 2.5–4.5)
PHOSPHATE SERPL-MCNC: 6.2 MG/DL — HIGH (ref 2.5–4.5)
PHOSPHATE SERPL-MCNC: 6.3 MG/DL — HIGH (ref 2.5–4.5)
PHOSPHATE SERPL-MCNC: 6.4 MG/DL — HIGH (ref 2.5–4.5)
PHOSPHATE SERPL-MCNC: 6.8 MG/DL — HIGH (ref 2.5–4.5)
PHOSPHATE SERPL-MCNC: 7.4 MG/DL — HIGH (ref 2.5–4.5)
PLAT MORPH BLD: ABNORMAL
PLAT MORPH BLD: NORMAL — SIGNIFICANT CHANGE UP
PLATELET # BLD AUTO: 102 K/UL — LOW (ref 150–400)
PLATELET # BLD AUTO: 105 K/UL — LOW (ref 150–400)
PLATELET # BLD AUTO: 114 K/UL — LOW (ref 150–400)
PLATELET # BLD AUTO: 115 K/UL — LOW (ref 150–400)
PLATELET # BLD AUTO: 116 K/UL — LOW (ref 150–400)
PLATELET # BLD AUTO: 117 K/UL — LOW (ref 150–400)
PLATELET # BLD AUTO: 123 K/UL — LOW (ref 150–400)
PLATELET # BLD AUTO: 132 K/UL — LOW (ref 150–400)
PLATELET # BLD AUTO: 133 K/UL — LOW (ref 150–400)
PLATELET # BLD AUTO: 137 K/UL — LOW (ref 150–400)
PLATELET # BLD AUTO: 140 K/UL — LOW (ref 150–400)
PLATELET # BLD AUTO: 146 K/UL — LOW (ref 150–400)
PLATELET # BLD AUTO: 155 K/UL — SIGNIFICANT CHANGE UP (ref 150–400)
PLATELET # BLD AUTO: 161 K/UL — SIGNIFICANT CHANGE UP (ref 150–400)
PLATELET # BLD AUTO: 163 K/UL — SIGNIFICANT CHANGE UP (ref 150–400)
PLATELET # BLD AUTO: 165 K/UL — SIGNIFICANT CHANGE UP (ref 150–400)
PLATELET # BLD AUTO: 166 K/UL — SIGNIFICANT CHANGE UP (ref 150–400)
PLATELET # BLD AUTO: 166 K/UL — SIGNIFICANT CHANGE UP (ref 150–400)
PLATELET # BLD AUTO: 168 K/UL — SIGNIFICANT CHANGE UP (ref 150–400)
PLATELET # BLD AUTO: 168 K/UL — SIGNIFICANT CHANGE UP (ref 150–400)
PLATELET # BLD AUTO: 170 K/UL — SIGNIFICANT CHANGE UP (ref 150–400)
PLATELET # BLD AUTO: 170 K/UL — SIGNIFICANT CHANGE UP (ref 150–400)
PLATELET # BLD AUTO: 172 K/UL — SIGNIFICANT CHANGE UP (ref 150–400)
PLATELET # BLD AUTO: 172 K/UL — SIGNIFICANT CHANGE UP (ref 150–400)
PLATELET # BLD AUTO: 178 K/UL — SIGNIFICANT CHANGE UP (ref 150–400)
PLATELET # BLD AUTO: 180 K/UL — SIGNIFICANT CHANGE UP (ref 150–400)
PLATELET # BLD AUTO: 182 K/UL — SIGNIFICANT CHANGE UP (ref 150–400)
PLATELET # BLD AUTO: 184 K/UL — SIGNIFICANT CHANGE UP (ref 150–400)
PLATELET # BLD AUTO: 186 K/UL — SIGNIFICANT CHANGE UP (ref 150–400)
PLATELET # BLD AUTO: 186 K/UL — SIGNIFICANT CHANGE UP (ref 150–400)
PLATELET # BLD AUTO: 189 K/UL — SIGNIFICANT CHANGE UP (ref 150–400)
PLATELET # BLD AUTO: 190 K/UL — SIGNIFICANT CHANGE UP (ref 150–400)
PLATELET # BLD AUTO: 195 K/UL — SIGNIFICANT CHANGE UP (ref 150–400)
PLATELET # BLD AUTO: 196 K/UL — SIGNIFICANT CHANGE UP (ref 150–400)
PLATELET # BLD AUTO: 200 K/UL — SIGNIFICANT CHANGE UP (ref 150–400)
PLATELET # BLD AUTO: 201 K/UL — SIGNIFICANT CHANGE UP (ref 150–400)
PLATELET # BLD AUTO: 202 K/UL — SIGNIFICANT CHANGE UP (ref 150–400)
PLATELET # BLD AUTO: 202 K/UL — SIGNIFICANT CHANGE UP (ref 150–400)
PLATELET # BLD AUTO: 204 K/UL — SIGNIFICANT CHANGE UP (ref 150–400)
PLATELET # BLD AUTO: 204 K/UL — SIGNIFICANT CHANGE UP (ref 150–400)
PLATELET # BLD AUTO: 208 K/UL — SIGNIFICANT CHANGE UP (ref 150–400)
PLATELET # BLD AUTO: 209 K/UL — SIGNIFICANT CHANGE UP (ref 150–400)
PLATELET # BLD AUTO: 210 K/UL — SIGNIFICANT CHANGE UP (ref 150–400)
PLATELET # BLD AUTO: 210 K/UL — SIGNIFICANT CHANGE UP (ref 150–400)
PLATELET # BLD AUTO: 211 K/UL — SIGNIFICANT CHANGE UP (ref 150–400)
PLATELET # BLD AUTO: 213 K/UL — SIGNIFICANT CHANGE UP (ref 150–400)
PLATELET # BLD AUTO: 214 K/UL — SIGNIFICANT CHANGE UP (ref 150–400)
PLATELET # BLD AUTO: 217 K/UL — SIGNIFICANT CHANGE UP (ref 150–400)
PLATELET # BLD AUTO: 222 K/UL — SIGNIFICANT CHANGE UP (ref 150–400)
PLATELET # BLD AUTO: 224 K/UL — SIGNIFICANT CHANGE UP (ref 150–400)
PLATELET # BLD AUTO: 225 K/UL — SIGNIFICANT CHANGE UP (ref 150–400)
PLATELET # BLD AUTO: 231 K/UL — SIGNIFICANT CHANGE UP (ref 150–400)
PLATELET # BLD AUTO: 234 K/UL — SIGNIFICANT CHANGE UP (ref 150–400)
PLATELET # BLD AUTO: 235 K/UL — SIGNIFICANT CHANGE UP (ref 150–400)
PLATELET # BLD AUTO: 239 K/UL — SIGNIFICANT CHANGE UP (ref 150–400)
PLATELET # BLD AUTO: 240 K/UL
PLATELET # BLD AUTO: 244 K/UL — SIGNIFICANT CHANGE UP (ref 150–400)
PLATELET # BLD AUTO: 246 K/UL — SIGNIFICANT CHANGE UP (ref 150–400)
PLATELET # BLD AUTO: 248 K/UL — SIGNIFICANT CHANGE UP (ref 150–400)
PLATELET # BLD AUTO: 248 K/UL — SIGNIFICANT CHANGE UP (ref 150–400)
PLATELET # BLD AUTO: 250 K/UL
PLATELET # BLD AUTO: 254 K/UL — SIGNIFICANT CHANGE UP (ref 150–400)
PLATELET # BLD AUTO: 254 K/UL — SIGNIFICANT CHANGE UP (ref 150–400)
PLATELET # BLD AUTO: 255 K/UL — SIGNIFICANT CHANGE UP (ref 150–400)
PLATELET # BLD AUTO: 256 K/UL — SIGNIFICANT CHANGE UP (ref 150–400)
PLATELET # BLD AUTO: 261 K/UL — SIGNIFICANT CHANGE UP (ref 150–400)
PLATELET # BLD AUTO: 262 K/UL — SIGNIFICANT CHANGE UP (ref 150–400)
PLATELET # BLD AUTO: 262 K/UL — SIGNIFICANT CHANGE UP (ref 150–400)
PLATELET # BLD AUTO: 265 K/UL — SIGNIFICANT CHANGE UP (ref 150–400)
PLATELET # BLD AUTO: 265 K/UL — SIGNIFICANT CHANGE UP (ref 150–400)
PLATELET # BLD AUTO: 270 K/UL — SIGNIFICANT CHANGE UP (ref 150–400)
PLATELET # BLD AUTO: 276 K/UL — SIGNIFICANT CHANGE UP (ref 150–400)
PLATELET # BLD AUTO: 281 K/UL — SIGNIFICANT CHANGE UP (ref 150–400)
PLATELET # BLD AUTO: 284 K/UL — SIGNIFICANT CHANGE UP (ref 150–400)
PLATELET # BLD AUTO: 286 K/UL — SIGNIFICANT CHANGE UP (ref 150–400)
PLATELET # BLD AUTO: 287 K/UL — SIGNIFICANT CHANGE UP (ref 150–400)
PLATELET # BLD AUTO: 288 K/UL — SIGNIFICANT CHANGE UP (ref 150–400)
PLATELET # BLD AUTO: 290 K/UL — SIGNIFICANT CHANGE UP (ref 150–400)
PLATELET # BLD AUTO: 292 K/UL — SIGNIFICANT CHANGE UP (ref 150–400)
PLATELET # BLD AUTO: 292 K/UL — SIGNIFICANT CHANGE UP (ref 150–400)
PLATELET # BLD AUTO: 293 K/UL — SIGNIFICANT CHANGE UP (ref 150–400)
PLATELET # BLD AUTO: 295 K/UL — SIGNIFICANT CHANGE UP (ref 150–400)
PLATELET # BLD AUTO: 296 K/UL — SIGNIFICANT CHANGE UP (ref 150–400)
PLATELET # BLD AUTO: 298 K/UL — SIGNIFICANT CHANGE UP (ref 150–400)
PLATELET # BLD AUTO: 301 K/UL — SIGNIFICANT CHANGE UP (ref 150–400)
PLATELET # BLD AUTO: 303 K/UL — SIGNIFICANT CHANGE UP (ref 150–400)
PLATELET # BLD AUTO: 303 K/UL — SIGNIFICANT CHANGE UP (ref 150–400)
PLATELET # BLD AUTO: 308 K/UL — SIGNIFICANT CHANGE UP (ref 150–400)
PLATELET # BLD AUTO: 308 K/UL — SIGNIFICANT CHANGE UP (ref 150–400)
PLATELET # BLD AUTO: 309 K/UL — SIGNIFICANT CHANGE UP (ref 150–400)
PLATELET # BLD AUTO: 309 K/UL — SIGNIFICANT CHANGE UP (ref 150–400)
PLATELET # BLD AUTO: 311 K/UL — SIGNIFICANT CHANGE UP (ref 150–400)
PLATELET # BLD AUTO: 313 K/UL — SIGNIFICANT CHANGE UP (ref 150–400)
PLATELET # BLD AUTO: 314 K/UL — SIGNIFICANT CHANGE UP (ref 150–400)
PLATELET # BLD AUTO: 315 K/UL — SIGNIFICANT CHANGE UP (ref 150–400)
PLATELET # BLD AUTO: 321 K/UL — SIGNIFICANT CHANGE UP (ref 150–400)
PLATELET # BLD AUTO: 322 K/UL — SIGNIFICANT CHANGE UP (ref 150–400)
PLATELET # BLD AUTO: 323 K/UL — SIGNIFICANT CHANGE UP (ref 150–400)
PLATELET # BLD AUTO: 326 K/UL — SIGNIFICANT CHANGE UP (ref 150–400)
PLATELET # BLD AUTO: 326 K/UL — SIGNIFICANT CHANGE UP (ref 150–400)
PLATELET # BLD AUTO: 330 K/UL — SIGNIFICANT CHANGE UP (ref 150–400)
PLATELET # BLD AUTO: 331 K/UL — SIGNIFICANT CHANGE UP (ref 150–400)
PLATELET # BLD AUTO: 331 K/UL — SIGNIFICANT CHANGE UP (ref 150–400)
PLATELET # BLD AUTO: 332 K/UL — SIGNIFICANT CHANGE UP (ref 150–400)
PLATELET # BLD AUTO: 332 K/UL — SIGNIFICANT CHANGE UP (ref 150–400)
PLATELET # BLD AUTO: 335 K/UL — SIGNIFICANT CHANGE UP (ref 150–400)
PLATELET # BLD AUTO: 336 K/UL — SIGNIFICANT CHANGE UP (ref 150–400)
PLATELET # BLD AUTO: 336 K/UL — SIGNIFICANT CHANGE UP (ref 150–400)
PLATELET # BLD AUTO: 337 K/UL — SIGNIFICANT CHANGE UP (ref 150–400)
PLATELET # BLD AUTO: 337 K/UL — SIGNIFICANT CHANGE UP (ref 150–400)
PLATELET # BLD AUTO: 339 K/UL — SIGNIFICANT CHANGE UP (ref 150–400)
PLATELET # BLD AUTO: 339 K/UL — SIGNIFICANT CHANGE UP (ref 150–400)
PLATELET # BLD AUTO: 340 K/UL — SIGNIFICANT CHANGE UP (ref 150–400)
PLATELET # BLD AUTO: 342 K/UL — SIGNIFICANT CHANGE UP (ref 150–400)
PLATELET # BLD AUTO: 343 K/UL — SIGNIFICANT CHANGE UP (ref 150–400)
PLATELET # BLD AUTO: 345 K/UL — SIGNIFICANT CHANGE UP (ref 150–400)
PLATELET # BLD AUTO: 346 K/UL — SIGNIFICANT CHANGE UP (ref 150–400)
PLATELET # BLD AUTO: 346 K/UL — SIGNIFICANT CHANGE UP (ref 150–400)
PLATELET # BLD AUTO: 351 K/UL — SIGNIFICANT CHANGE UP (ref 150–400)
PLATELET # BLD AUTO: 355 K/UL — SIGNIFICANT CHANGE UP (ref 150–400)
PLATELET # BLD AUTO: 356 K/UL
PLATELET # BLD AUTO: 356 K/UL — SIGNIFICANT CHANGE UP (ref 150–400)
PLATELET # BLD AUTO: 359 K/UL — SIGNIFICANT CHANGE UP (ref 150–400)
PLATELET # BLD AUTO: 361 K/UL — SIGNIFICANT CHANGE UP (ref 150–400)
PLATELET # BLD AUTO: 364 K/UL — SIGNIFICANT CHANGE UP (ref 150–400)
PLATELET # BLD AUTO: 365 K/UL — SIGNIFICANT CHANGE UP (ref 150–400)
PLATELET # BLD AUTO: 370 K/UL — SIGNIFICANT CHANGE UP (ref 150–400)
PLATELET # BLD AUTO: 382 K/UL — SIGNIFICANT CHANGE UP (ref 150–400)
PLATELET # BLD AUTO: 386 K/UL — SIGNIFICANT CHANGE UP (ref 150–400)
PLATELET # BLD AUTO: 388 K/UL — SIGNIFICANT CHANGE UP (ref 150–400)
PLATELET # BLD AUTO: 391 K/UL — SIGNIFICANT CHANGE UP (ref 150–400)
PLATELET # BLD AUTO: 391 K/UL — SIGNIFICANT CHANGE UP (ref 150–400)
PLATELET # BLD AUTO: 392 K/UL — SIGNIFICANT CHANGE UP (ref 150–400)
PLATELET # BLD AUTO: 393 K/UL — SIGNIFICANT CHANGE UP (ref 150–400)
PLATELET # BLD AUTO: 398 K/UL — SIGNIFICANT CHANGE UP (ref 150–400)
PLATELET # BLD AUTO: 398 K/UL — SIGNIFICANT CHANGE UP (ref 150–400)
PLATELET # BLD AUTO: 399 K/UL — SIGNIFICANT CHANGE UP (ref 150–400)
PLATELET # BLD AUTO: 400 K/UL — SIGNIFICANT CHANGE UP (ref 150–400)
PLATELET # BLD AUTO: 405 K/UL — HIGH (ref 150–400)
PLATELET # BLD AUTO: 405 K/UL — HIGH (ref 150–400)
PLATELET # BLD AUTO: 406 K/UL — HIGH (ref 150–400)
PLATELET # BLD AUTO: 410 K/UL — HIGH (ref 150–400)
PLATELET # BLD AUTO: 413 K/UL — HIGH (ref 150–400)
PLATELET # BLD AUTO: 419 K/UL — HIGH (ref 150–400)
PLATELET # BLD AUTO: 420 K/UL — HIGH (ref 150–400)
PLATELET # BLD AUTO: 423 K/UL — HIGH (ref 150–400)
PLATELET # BLD AUTO: 425 K/UL — HIGH (ref 150–400)
PLATELET # BLD AUTO: 434 K/UL — HIGH (ref 150–400)
PLATELET # BLD AUTO: 438 K/UL — HIGH (ref 150–400)
PLATELET # BLD AUTO: 442 K/UL — HIGH (ref 150–400)
PLATELET # BLD AUTO: 444 K/UL — HIGH (ref 150–400)
PLATELET # BLD AUTO: 446 K/UL — HIGH (ref 150–400)
PLATELET # BLD AUTO: 446 K/UL — HIGH (ref 150–400)
PLATELET # BLD AUTO: 447 K/UL — HIGH (ref 150–400)
PLATELET # BLD AUTO: 449 K/UL — HIGH (ref 150–400)
PLATELET # BLD AUTO: 454 K/UL — HIGH (ref 150–400)
PLATELET # BLD AUTO: 455 K/UL — HIGH (ref 150–400)
PLATELET # BLD AUTO: 456 K/UL — HIGH (ref 150–400)
PLATELET # BLD AUTO: 463 K/UL — HIGH (ref 150–400)
PLATELET # BLD AUTO: 465 K/UL — HIGH (ref 150–400)
PLATELET # BLD AUTO: 465 K/UL — HIGH (ref 150–400)
PLATELET # BLD AUTO: 468 K/UL — HIGH (ref 150–400)
PLATELET # BLD AUTO: 469 K/UL — HIGH (ref 150–400)
PLATELET # BLD AUTO: 470 K/UL — HIGH (ref 150–400)
PLATELET # BLD AUTO: 470 K/UL — HIGH (ref 150–400)
PLATELET # BLD AUTO: 472 K/UL — HIGH (ref 150–400)
PLATELET # BLD AUTO: 482 K/UL — HIGH (ref 150–400)
PLATELET # BLD AUTO: 483 K/UL — HIGH (ref 150–400)
PLATELET # BLD AUTO: 501 K/UL — HIGH (ref 150–400)
PLATELET # BLD AUTO: 514 K/UL — HIGH (ref 150–400)
PLATELET # BLD AUTO: 66 K/UL — LOW (ref 150–400)
PO2 BLDV: 106 MMHG — HIGH (ref 25–45)
PO2 BLDV: 29 MMHG — SIGNIFICANT CHANGE UP (ref 25–45)
PO2 BLDV: 29 MMHG — SIGNIFICANT CHANGE UP (ref 25–45)
PO2 BLDV: 30 MMHG — SIGNIFICANT CHANGE UP (ref 25–45)
PO2 BLDV: 32 MMHG — SIGNIFICANT CHANGE UP
PO2 BLDV: 35 MMHG — SIGNIFICANT CHANGE UP
PO2 BLDV: 43 MMHG — SIGNIFICANT CHANGE UP (ref 25–45)
PO2 BLDV: 48 MMHG — HIGH (ref 25–45)
PO2 BLDV: 51 MMHG — HIGH (ref 25–45)
PO2 BLDV: 66 MMHG — HIGH (ref 25–45)
PO2 BLDV: 69 MMHG — HIGH (ref 25–45)
POIKILOCYTOSIS BLD QL AUTO: SIGNIFICANT CHANGE UP
POIKILOCYTOSIS BLD QL AUTO: SLIGHT — SIGNIFICANT CHANGE UP
POLYCHROMASIA BLD QL SMEAR: SLIGHT — SIGNIFICANT CHANGE UP
POTASSIUM BLDV-SCNC: 4 MMOL/L — SIGNIFICANT CHANGE UP (ref 3.5–5.1)
POTASSIUM BLDV-SCNC: 4.4 MMOL/L — SIGNIFICANT CHANGE UP (ref 3.5–5.1)
POTASSIUM BLDV-SCNC: 4.5 MMOL/L — SIGNIFICANT CHANGE UP (ref 3.5–5.1)
POTASSIUM BLDV-SCNC: 4.7 MMOL/L — SIGNIFICANT CHANGE UP (ref 3.5–5.1)
POTASSIUM BLDV-SCNC: 4.7 MMOL/L — SIGNIFICANT CHANGE UP (ref 3.5–5.1)
POTASSIUM BLDV-SCNC: 4.8 MMOL/L — SIGNIFICANT CHANGE UP (ref 3.5–5.1)
POTASSIUM BLDV-SCNC: 4.9 MMOL/L — SIGNIFICANT CHANGE UP (ref 3.5–5.1)
POTASSIUM BLDV-SCNC: 5 MMOL/L — SIGNIFICANT CHANGE UP (ref 3.5–5.1)
POTASSIUM BLDV-SCNC: 5.2 MMOL/L — HIGH (ref 3.5–5.1)
POTASSIUM BLDV-SCNC: 5.4 MMOL/L — HIGH (ref 3.5–5.1)
POTASSIUM BLDV-SCNC: 5.7 MMOL/L — HIGH (ref 3.5–5.1)
POTASSIUM SERPL-MCNC: 3.5 MMOL/L — SIGNIFICANT CHANGE UP (ref 3.5–5.3)
POTASSIUM SERPL-MCNC: 3.5 MMOL/L — SIGNIFICANT CHANGE UP (ref 3.5–5.3)
POTASSIUM SERPL-MCNC: 3.7 MMOL/L — SIGNIFICANT CHANGE UP (ref 3.5–5.3)
POTASSIUM SERPL-MCNC: 3.8 MMOL/L — SIGNIFICANT CHANGE UP (ref 3.5–5.3)
POTASSIUM SERPL-MCNC: 3.9 MMOL/L — SIGNIFICANT CHANGE UP (ref 3.5–5.3)
POTASSIUM SERPL-MCNC: 4 MMOL/L — SIGNIFICANT CHANGE UP (ref 3.5–5.3)
POTASSIUM SERPL-MCNC: 4.1 MMOL/L — SIGNIFICANT CHANGE UP (ref 3.5–5.3)
POTASSIUM SERPL-MCNC: 4.2 MMOL/L — SIGNIFICANT CHANGE UP (ref 3.5–5.3)
POTASSIUM SERPL-MCNC: 4.3 MMOL/L — SIGNIFICANT CHANGE UP (ref 3.5–5.3)
POTASSIUM SERPL-MCNC: 4.4 MMOL/L — SIGNIFICANT CHANGE UP (ref 3.5–5.3)
POTASSIUM SERPL-MCNC: 4.5 MMOL/L — SIGNIFICANT CHANGE UP (ref 3.5–5.3)
POTASSIUM SERPL-MCNC: 4.6 MMOL/L — SIGNIFICANT CHANGE UP (ref 3.5–5.3)
POTASSIUM SERPL-MCNC: 4.7 MMOL/L — SIGNIFICANT CHANGE UP (ref 3.5–5.3)
POTASSIUM SERPL-MCNC: 4.8 MMOL/L — SIGNIFICANT CHANGE UP (ref 3.5–5.3)
POTASSIUM SERPL-MCNC: 4.9 MMOL/L — SIGNIFICANT CHANGE UP (ref 3.5–5.3)
POTASSIUM SERPL-MCNC: 5 MMOL/L — SIGNIFICANT CHANGE UP (ref 3.5–5.3)
POTASSIUM SERPL-MCNC: 5.1 MMOL/L — SIGNIFICANT CHANGE UP (ref 3.5–5.3)
POTASSIUM SERPL-MCNC: 5.2 MMOL/L — SIGNIFICANT CHANGE UP (ref 3.5–5.3)
POTASSIUM SERPL-MCNC: 5.3 MMOL/L — SIGNIFICANT CHANGE UP (ref 3.5–5.3)
POTASSIUM SERPL-MCNC: 5.4 MMOL/L — HIGH (ref 3.5–5.3)
POTASSIUM SERPL-MCNC: 5.5 MMOL/L — HIGH (ref 3.5–5.3)
POTASSIUM SERPL-MCNC: 5.6 MMOL/L — HIGH (ref 3.5–5.3)
POTASSIUM SERPL-MCNC: 5.7 MMOL/L — HIGH (ref 3.5–5.3)
POTASSIUM SERPL-MCNC: 5.7 MMOL/L — HIGH (ref 3.5–5.3)
POTASSIUM SERPL-MCNC: 5.8 MMOL/L — HIGH (ref 3.5–5.3)
POTASSIUM SERPL-MCNC: 5.9 MMOL/L — HIGH (ref 3.5–5.3)
POTASSIUM SERPL-MCNC: 6 MMOL/L — HIGH (ref 3.5–5.3)
POTASSIUM SERPL-MCNC: 6.2 MMOL/L — CRITICAL HIGH (ref 3.5–5.3)
POTASSIUM SERPL-MCNC: 6.3 MMOL/L — CRITICAL HIGH (ref 3.5–5.3)
POTASSIUM SERPL-MCNC: 6.5 MMOL/L — CRITICAL HIGH (ref 3.5–5.3)
POTASSIUM SERPL-SCNC: 3.5 MMOL/L — SIGNIFICANT CHANGE UP (ref 3.5–5.3)
POTASSIUM SERPL-SCNC: 3.5 MMOL/L — SIGNIFICANT CHANGE UP (ref 3.5–5.3)
POTASSIUM SERPL-SCNC: 3.7 MMOL/L — SIGNIFICANT CHANGE UP (ref 3.5–5.3)
POTASSIUM SERPL-SCNC: 3.8 MMOL/L — SIGNIFICANT CHANGE UP (ref 3.5–5.3)
POTASSIUM SERPL-SCNC: 3.9 MMOL/L — SIGNIFICANT CHANGE UP (ref 3.5–5.3)
POTASSIUM SERPL-SCNC: 4 MMOL/L — SIGNIFICANT CHANGE UP (ref 3.5–5.3)
POTASSIUM SERPL-SCNC: 4.1 MMOL/L — SIGNIFICANT CHANGE UP (ref 3.5–5.3)
POTASSIUM SERPL-SCNC: 4.2 MMOL/L — SIGNIFICANT CHANGE UP (ref 3.5–5.3)
POTASSIUM SERPL-SCNC: 4.3 MMOL/L — SIGNIFICANT CHANGE UP (ref 3.5–5.3)
POTASSIUM SERPL-SCNC: 4.4 MMOL/L
POTASSIUM SERPL-SCNC: 4.4 MMOL/L — SIGNIFICANT CHANGE UP (ref 3.5–5.3)
POTASSIUM SERPL-SCNC: 4.5 MMOL/L — SIGNIFICANT CHANGE UP (ref 3.5–5.3)
POTASSIUM SERPL-SCNC: 4.6 MMOL/L — SIGNIFICANT CHANGE UP (ref 3.5–5.3)
POTASSIUM SERPL-SCNC: 4.7 MMOL/L — SIGNIFICANT CHANGE UP (ref 3.5–5.3)
POTASSIUM SERPL-SCNC: 4.8 MMOL/L — SIGNIFICANT CHANGE UP (ref 3.5–5.3)
POTASSIUM SERPL-SCNC: 4.9 MMOL/L — SIGNIFICANT CHANGE UP (ref 3.5–5.3)
POTASSIUM SERPL-SCNC: 5 MMOL/L — SIGNIFICANT CHANGE UP (ref 3.5–5.3)
POTASSIUM SERPL-SCNC: 5.1 MMOL/L — SIGNIFICANT CHANGE UP (ref 3.5–5.3)
POTASSIUM SERPL-SCNC: 5.2 MMOL/L — SIGNIFICANT CHANGE UP (ref 3.5–5.3)
POTASSIUM SERPL-SCNC: 5.3 MMOL/L — SIGNIFICANT CHANGE UP (ref 3.5–5.3)
POTASSIUM SERPL-SCNC: 5.4 MMOL/L — HIGH (ref 3.5–5.3)
POTASSIUM SERPL-SCNC: 5.5 MMOL/L — HIGH (ref 3.5–5.3)
POTASSIUM SERPL-SCNC: 5.6 MMOL/L — HIGH (ref 3.5–5.3)
POTASSIUM SERPL-SCNC: 5.7 MMOL/L — HIGH (ref 3.5–5.3)
POTASSIUM SERPL-SCNC: 5.7 MMOL/L — HIGH (ref 3.5–5.3)
POTASSIUM SERPL-SCNC: 5.8 MMOL/L — HIGH (ref 3.5–5.3)
POTASSIUM SERPL-SCNC: 5.9 MMOL/L — HIGH (ref 3.5–5.3)
POTASSIUM SERPL-SCNC: 6 MMOL/L — HIGH (ref 3.5–5.3)
POTASSIUM SERPL-SCNC: 6.2 MMOL/L — CRITICAL HIGH (ref 3.5–5.3)
POTASSIUM SERPL-SCNC: 6.3 MMOL/L
POTASSIUM SERPL-SCNC: 6.3 MMOL/L — CRITICAL HIGH (ref 3.5–5.3)
POTASSIUM SERPL-SCNC: 6.5 MMOL/L — CRITICAL HIGH (ref 3.5–5.3)
POTASSIUM SERPL-SCNC: 6.8 MMOL/L
POTASSIUM UR-SCNC: 10 MMOL/L — SIGNIFICANT CHANGE UP
POTASSIUM UR-SCNC: 22 MMOL/L — SIGNIFICANT CHANGE UP
PROCALCITONIN SERPL-MCNC: 0.06 NG/ML — SIGNIFICANT CHANGE UP (ref 0.02–0.1)
PROCALCITONIN SERPL-MCNC: 0.67 NG/ML — HIGH (ref 0.02–0.1)
PROCALCITONIN SERPL-MCNC: 0.68 NG/ML — HIGH (ref 0.02–0.1)
PROCALCITONIN SERPL-MCNC: 0.69 NG/ML — HIGH (ref 0.02–0.1)
PROCALCITONIN SERPL-MCNC: 0.69 NG/ML — HIGH (ref 0.02–0.1)
PROCALCITONIN SERPL-MCNC: 0.7 NG/ML — HIGH (ref 0.02–0.1)
PROCALCITONIN SERPL-MCNC: 0.77 NG/ML — HIGH (ref 0.02–0.1)
PROCALCITONIN SERPL-MCNC: 0.78 NG/ML — HIGH (ref 0.02–0.1)
PROCALCITONIN SERPL-MCNC: 0.81 NG/ML — HIGH (ref 0.02–0.1)
PROCALCITONIN SERPL-MCNC: 0.81 NG/ML — HIGH (ref 0.02–0.1)
PROCALCITONIN SERPL-MCNC: 0.82 NG/ML — HIGH (ref 0.02–0.1)
PROCALCITONIN SERPL-MCNC: 0.85 NG/ML — HIGH (ref 0.02–0.1)
PROCALCITONIN SERPL-MCNC: 0.85 NG/ML — HIGH (ref 0.02–0.1)
PROCALCITONIN SERPL-MCNC: 0.86 NG/ML — HIGH (ref 0.02–0.1)
PROCALCITONIN SERPL-MCNC: 0.88 NG/ML — HIGH (ref 0.02–0.1)
PROCALCITONIN SERPL-MCNC: 0.88 NG/ML — HIGH (ref 0.02–0.1)
PROCALCITONIN SERPL-MCNC: 0.9 NG/ML — HIGH (ref 0.02–0.1)
PROCALCITONIN SERPL-MCNC: 0.94 NG/ML — HIGH (ref 0.02–0.1)
PROCALCITONIN SERPL-MCNC: 0.95 NG/ML — HIGH (ref 0.02–0.1)
PROCALCITONIN SERPL-MCNC: 0.96 NG/ML — HIGH (ref 0.02–0.1)
PROCALCITONIN SERPL-MCNC: 0.96 NG/ML — HIGH (ref 0.02–0.1)
PROCALCITONIN SERPL-MCNC: 0.97 NG/ML — HIGH (ref 0.02–0.1)
PROCALCITONIN SERPL-MCNC: 0.98 NG/ML — HIGH (ref 0.02–0.1)
PROCALCITONIN SERPL-MCNC: 1.1 NG/ML — HIGH (ref 0.02–0.1)
PROCALCITONIN SERPL-MCNC: 1.11 NG/ML — HIGH (ref 0.02–0.1)
PROCALCITONIN SERPL-MCNC: 1.18 NG/ML — HIGH (ref 0.02–0.1)
PROCALCITONIN SERPL-MCNC: 1.19 NG/ML — HIGH (ref 0.02–0.1)
PROCALCITONIN SERPL-MCNC: 1.31 NG/ML — HIGH (ref 0.02–0.1)
PROCALCITONIN SERPL-MCNC: 1.34 NG/ML — HIGH (ref 0.02–0.1)
PROCALCITONIN SERPL-MCNC: 1.4 NG/ML — HIGH (ref 0.02–0.1)
PROCALCITONIN SERPL-MCNC: 1.5 NG/ML — HIGH (ref 0.02–0.1)
PROCALCITONIN SERPL-MCNC: 1.51 NG/ML — HIGH (ref 0.02–0.1)
PROCALCITONIN SERPL-MCNC: 1.67 NG/ML — HIGH (ref 0.02–0.1)
PROLACTIN SERPL-MCNC: 17.2 NG/ML — SIGNIFICANT CHANGE UP (ref 4.1–18.4)
PROMYELOCYTES # FLD: 0.8 % — HIGH (ref 0–0)
PROMYELOCYTES # FLD: 0.9 % — HIGH (ref 0–0)
PROMYELOCYTES # FLD: 0.9 % — HIGH (ref 0–0)
PROT ?TM UR-MCNC: 18 MG/DL — HIGH (ref 0–12)
PROT ?TM UR-MCNC: 24 MG/DL — HIGH (ref 0–12)
PROT ?TM UR-MCNC: 58 MG/DL — HIGH (ref 0–12)
PROT CSF-MCNC: 46 MG/DL — HIGH (ref 15–45)
PROT FLD-MCNC: 2.3 G/DL — SIGNIFICANT CHANGE UP
PROT PATTERN SERPL ELPH-IMP: SIGNIFICANT CHANGE UP
PROT SERPL-MCNC: 4.8 G/DL — LOW (ref 6–8.3)
PROT SERPL-MCNC: 5.1 G/DL — LOW (ref 6–8.3)
PROT SERPL-MCNC: 5.2 G/DL — LOW (ref 6–8.3)
PROT SERPL-MCNC: 5.3 G/DL — LOW (ref 6–8.3)
PROT SERPL-MCNC: 5.4 G/DL — LOW (ref 6–8.3)
PROT SERPL-MCNC: 5.5 G/DL — LOW (ref 6–8.3)
PROT SERPL-MCNC: 5.6 G/DL — LOW (ref 6–8.3)
PROT SERPL-MCNC: 5.7 G/DL — LOW (ref 6–8.3)
PROT SERPL-MCNC: 5.8 G/DL — LOW (ref 6–8.3)
PROT SERPL-MCNC: 5.9 G/DL — LOW (ref 6–8.3)
PROT SERPL-MCNC: 6 G/DL — SIGNIFICANT CHANGE UP (ref 6–8.3)
PROT SERPL-MCNC: 6.1 G/DL — SIGNIFICANT CHANGE UP (ref 6–8.3)
PROT SERPL-MCNC: 6.2 G/DL — SIGNIFICANT CHANGE UP (ref 6–8.3)
PROT SERPL-MCNC: 6.3 G/DL — SIGNIFICANT CHANGE UP (ref 6–8.3)
PROT SERPL-MCNC: 6.3 G/DL — SIGNIFICANT CHANGE UP (ref 6–8.3)
PROT SERPL-MCNC: 6.4 G/DL — SIGNIFICANT CHANGE UP (ref 6–8.3)
PROT SERPL-MCNC: 6.5 G/DL
PROT SERPL-MCNC: 6.5 G/DL — SIGNIFICANT CHANGE UP (ref 6–8.3)
PROT SERPL-MCNC: 6.6 G/DL — SIGNIFICANT CHANGE UP (ref 6–8.3)
PROT SERPL-MCNC: 6.7 G/DL — SIGNIFICANT CHANGE UP (ref 6–8.3)
PROT SERPL-MCNC: 6.8 G/DL
PROT SERPL-MCNC: 6.8 G/DL — SIGNIFICANT CHANGE UP (ref 6–8.3)
PROT SERPL-MCNC: 6.9 G/DL — SIGNIFICANT CHANGE UP (ref 6–8.3)
PROT SERPL-MCNC: 6.9 G/DL — SIGNIFICANT CHANGE UP (ref 6–8.3)
PROT SERPL-MCNC: 7 G/DL — SIGNIFICANT CHANGE UP (ref 6–8.3)
PROT SERPL-MCNC: 7.1 G/DL — SIGNIFICANT CHANGE UP (ref 6–8.3)
PROT SERPL-MCNC: 7.2 G/DL — SIGNIFICANT CHANGE UP (ref 6–8.3)
PROT SERPL-MCNC: 7.3 G/DL
PROT SERPL-MCNC: 7.6 G/DL — SIGNIFICANT CHANGE UP (ref 6–8.3)
PROT SERPL-MCNC: 7.6 G/DL — SIGNIFICANT CHANGE UP (ref 6–8.3)
PROT SERPL-MCNC: 7.7 G/DL — SIGNIFICANT CHANGE UP (ref 6–8.3)
PROT SERPL-MCNC: 8.7 G/DL — HIGH (ref 6–8.3)
PROT UR-MCNC: 100 — SIGNIFICANT CHANGE UP
PROT UR-MCNC: 58 MG/DL
PROT UR-MCNC: >600
PROT UR-MCNC: ABNORMAL
PROT/CREAT UR-RTO: 0.5 RATIO — HIGH (ref 0–0.2)
PROT/CREAT UR-RTO: 0.5 RATIO — HIGH (ref 0–0.2)
PROT/CREAT UR-RTO: 0.8 RATIO — HIGH (ref 0–0.2)
PROTEIN URINE: ABNORMAL
PROTHROM AB SERPL-ACNC: 12 SEC — SIGNIFICANT CHANGE UP (ref 10.5–13.4)
PROTHROM AB SERPL-ACNC: 12.3 SEC — SIGNIFICANT CHANGE UP (ref 10.5–13.4)
PROTHROM AB SERPL-ACNC: 13 SEC — SIGNIFICANT CHANGE UP (ref 10.5–13.4)
PROTHROM AB SERPL-ACNC: 13.1 SEC — SIGNIFICANT CHANGE UP (ref 10.5–13.4)
PROTHROM AB SERPL-ACNC: 13.2 SEC — SIGNIFICANT CHANGE UP (ref 10.5–13.4)
PROTHROM AB SERPL-ACNC: 13.3 SEC — SIGNIFICANT CHANGE UP (ref 10.5–13.4)
PROTHROM AB SERPL-ACNC: 13.4 SEC — SIGNIFICANT CHANGE UP (ref 10.5–13.4)
PROTHROM AB SERPL-ACNC: 13.4 SEC — SIGNIFICANT CHANGE UP (ref 10.5–13.4)
PROTHROM AB SERPL-ACNC: 13.5 SEC — HIGH (ref 10.5–13.4)
PROTHROM AB SERPL-ACNC: 13.6 SEC — HIGH (ref 10.5–13.4)
PROTHROM AB SERPL-ACNC: 13.6 SEC — HIGH (ref 10.5–13.4)
PROTHROM AB SERPL-ACNC: 13.7 SEC — HIGH (ref 10.5–13.4)
PROTHROM AB SERPL-ACNC: 13.7 SEC — HIGH (ref 10.5–13.4)
PROTHROM AB SERPL-ACNC: 13.8 SEC — HIGH (ref 10.5–13.4)
PROTHROM AB SERPL-ACNC: 13.8 SEC — HIGH (ref 10.5–13.4)
PROTHROM AB SERPL-ACNC: 13.9 SEC — HIGH (ref 10.5–13.4)
PROTHROM AB SERPL-ACNC: 14 SEC — HIGH (ref 10.5–13.4)
PROTHROM AB SERPL-ACNC: 14.1 SEC — HIGH (ref 10.5–13.4)
PROTHROM AB SERPL-ACNC: 14.1 SEC — HIGH (ref 10.5–13.4)
PROTHROM AB SERPL-ACNC: 14.2 SEC — HIGH (ref 10.5–13.4)
PROTHROM AB SERPL-ACNC: 14.2 SEC — HIGH (ref 10.5–13.4)
PROTHROM AB SERPL-ACNC: 14.3 SEC — HIGH (ref 10.5–13.4)
PROTHROM AB SERPL-ACNC: 14.3 SEC — HIGH (ref 10.5–13.4)
PROTHROM AB SERPL-ACNC: 14.5 SEC — HIGH (ref 10.5–13.4)
PROTHROM AB SERPL-ACNC: 14.5 SEC — HIGH (ref 10.5–13.4)
PROTHROM AB SERPL-ACNC: 14.7 SEC — HIGH (ref 10.5–13.4)
PROTHROM AB SERPL-ACNC: 14.7 SEC — HIGH (ref 10.5–13.4)
PROTHROM AB SERPL-ACNC: 14.8 SEC — HIGH (ref 10.5–13.4)
PROTHROM AB SERPL-ACNC: 14.8 SEC — HIGH (ref 10.5–13.4)
PROTHROM AB SERPL-ACNC: 14.9 SEC — HIGH (ref 10.5–13.4)
PROTHROM AB SERPL-ACNC: 15 SEC — HIGH (ref 10.5–13.4)
PROTHROM AB SERPL-ACNC: 15.2 SEC — HIGH (ref 10.5–13.4)
PROTHROM AB SERPL-ACNC: 15.3 SEC — HIGH (ref 10.5–13.4)
PROTHROM AB SERPL-ACNC: 15.4 SEC — HIGH (ref 10.5–13.4)
PROTHROM AB SERPL-ACNC: 15.5 SEC — HIGH (ref 10.5–13.4)
PROTHROM AB SERPL-ACNC: 15.5 SEC — HIGH (ref 10.5–13.4)
PROTHROM AB SERPL-ACNC: 15.7 SEC — HIGH (ref 10.5–13.4)
PROTHROM AB SERPL-ACNC: 15.8 SEC — HIGH (ref 10.5–13.4)
PROTHROM AB SERPL-ACNC: 15.8 SEC — HIGH (ref 10.5–13.4)
PROTHROM AB SERPL-ACNC: 15.9 SEC — HIGH (ref 10.5–13.4)
PROTHROM AB SERPL-ACNC: 15.9 SEC — HIGH (ref 10.5–13.4)
PROTHROM AB SERPL-ACNC: 16 SEC — HIGH (ref 10.5–13.4)
PROTHROM AB SERPL-ACNC: 16 SEC — HIGH (ref 10.5–13.4)
PROTHROM AB SERPL-ACNC: 16.1 SEC — HIGH (ref 10.5–13.4)
PROTHROM AB SERPL-ACNC: 16.2 SEC — HIGH (ref 10.5–13.4)
PROTHROM AB SERPL-ACNC: 16.2 SEC — HIGH (ref 10.5–13.4)
PROTHROM AB SERPL-ACNC: 16.3 SEC — HIGH (ref 10.5–13.4)
PROTHROM AB SERPL-ACNC: 16.3 SEC — HIGH (ref 10.5–13.4)
PROTHROM AB SERPL-ACNC: 16.5 SEC — HIGH (ref 10.5–13.4)
PROTHROM AB SERPL-ACNC: 16.6 SEC — HIGH (ref 10.5–13.4)
PROTHROM AB SERPL-ACNC: 16.6 SEC — HIGH (ref 10.5–13.4)
PROTHROM AB SERPL-ACNC: 16.7 SEC — HIGH (ref 10.6–13.6)
PROTHROM AB SERPL-ACNC: 16.8 SEC — HIGH (ref 10.5–13.4)
PROTHROM AB SERPL-ACNC: 16.9 SEC — HIGH (ref 10.5–13.4)
PROTHROM AB SERPL-ACNC: 16.9 SEC — HIGH (ref 10.5–13.4)
PROTHROM AB SERPL-ACNC: 17.1 SEC — HIGH (ref 10.5–13.4)
PROTHROM AB SERPL-ACNC: 17.2 SEC — HIGH (ref 10.5–13.4)
PROTHROM AB SERPL-ACNC: 17.3 SEC — HIGH (ref 10.5–13.4)
PROTHROM AB SERPL-ACNC: 17.4 SEC — HIGH (ref 10.5–13.4)
PROTHROM AB SERPL-ACNC: 17.5 SEC — HIGH (ref 10.5–13.4)
PROTHROM AB SERPL-ACNC: 17.5 SEC — HIGH (ref 10.5–13.4)
PROTHROM AB SERPL-ACNC: 17.6 SEC — HIGH (ref 10.5–13.4)
PROTHROM AB SERPL-ACNC: 18 SEC — HIGH (ref 10.5–13.4)
PROTHROM AB SERPL-ACNC: 18.2 SEC — HIGH (ref 10.5–13.4)
PROTHROM AB SERPL-ACNC: 18.5 SEC — HIGH (ref 10.5–13.4)
PROTHROM AB SERPL-ACNC: 18.6 SEC — HIGH (ref 10.5–13.4)
PROTHROM AB SERPL-ACNC: 18.7 SEC — HIGH (ref 10.5–13.4)
PROTHROM AB SERPL-ACNC: 19.1 SEC — HIGH (ref 10.5–13.4)
PROTHROM AB SERPL-ACNC: 19.2 SEC — HIGH (ref 10.5–13.4)
PROTHROM AB SERPL-ACNC: 19.8 SEC — HIGH (ref 10.5–13.4)
PROTHROM AB SERPL-ACNC: 20.3 SEC — HIGH (ref 10.5–13.4)
PROTHROM AB SERPL-ACNC: 20.6 SEC — HIGH (ref 10.5–13.4)
PROTHROM AB SERPL-ACNC: 20.7 SEC — HIGH (ref 10.5–13.4)
PROTHROM AB SERPL-ACNC: 21.2 SEC — HIGH (ref 10.5–13.4)
PROTHROM AB SERPL-ACNC: 21.3 SEC — HIGH (ref 10.5–13.4)
PROTHROM AB SERPL-ACNC: 21.8 SEC — HIGH (ref 10.5–13.4)
PROTHROM AB SERPL-ACNC: 22.2 SEC — HIGH (ref 10.5–13.4)
PROTHROM AB SERPL-ACNC: 22.3 SEC — HIGH (ref 10.5–13.4)
PROTHROM AB SERPL-ACNC: 23.1 SEC — HIGH (ref 10.5–13.4)
PROTHROM AB SERPL-ACNC: 24.5 SEC — HIGH (ref 10.5–13.4)
PROTHROM AB SERPL-ACNC: 24.7 SEC — HIGH (ref 10.5–13.4)
PROTHROM AB SERPL-ACNC: 26.9 SEC — HIGH (ref 10.5–13.4)
PROTHROM AB SERPL-ACNC: 26.9 SEC — HIGH (ref 10.5–13.4)
PROTHROM AB SERPL-ACNC: 27.5 SEC — HIGH (ref 10.5–13.4)
PROTHROM AB SERPL-ACNC: 28.4 SEC — HIGH (ref 10.5–13.4)
PROTHROM AB SERPL-ACNC: 28.4 SEC — HIGH (ref 10.5–13.4)
PROTHROM AB SERPL-ACNC: 28.5 SEC — HIGH (ref 10.5–13.4)
PROTHROM AB SERPL-ACNC: 28.6 SEC — HIGH (ref 10.5–13.4)
PROTHROM AB SERPL-ACNC: 29.2 SEC — HIGH (ref 10.5–13.4)
PROTHROM AB SERPL-ACNC: 30.4 SEC — HIGH (ref 10.5–13.4)
PROTHROM AB SERPL-ACNC: 30.6 SEC — HIGH (ref 10.5–13.4)
PROTHROM AB SERPL-ACNC: 30.7 SEC — HIGH (ref 10.5–13.4)
PROTHROM AB SERPL-ACNC: 31.3 SEC — HIGH (ref 10.5–13.4)
PROTHROM AB SERPL-ACNC: 31.5 SEC — HIGH (ref 10.5–13.4)
PROTHROM AB SERPL-ACNC: 31.9 SEC — HIGH (ref 10.5–13.4)
PROTHROM AB SERPL-ACNC: 33.1 SEC — HIGH (ref 10.5–13.4)
PROTHROM AB SERPL-ACNC: 35.4 SEC — HIGH (ref 10.5–13.4)
PROTHROM AB SERPL-ACNC: 36.5 SEC — HIGH (ref 10.5–13.4)
PROTHROM AB SERPL-ACNC: 37.1 SEC — HIGH (ref 10.5–13.4)
PROTHROM AB SERPL-ACNC: 38 SEC — HIGH (ref 10.5–13.4)
PROTHROM AB SERPL-ACNC: 51.2 SEC — HIGH (ref 10.5–13.4)
PROTHROM AB SERPL-ACNC: 66.5 SEC — HIGH (ref 10.5–13.4)
PTH-INTACT FLD-MCNC: 178 PG/ML — HIGH (ref 15–65)
PTH-INTACT FLD-MCNC: 183 PG/ML — HIGH (ref 15–65)
PTH-INTACT FLD-MCNC: 202 PG/ML — HIGH (ref 15–65)
PTH-INTACT FLD-MCNC: 294 PG/ML — HIGH (ref 15–65)
RAPID RVP RESULT: SIGNIFICANT CHANGE UP
RAPID RVP RESULT: SIGNIFICANT CHANGE UP
RAPIDTEG MAXIMUM AMPLITUDE: 68.3 MM — SIGNIFICANT CHANGE UP (ref 52–70)
RAPIDTEG MAXIMUM AMPLITUDE: 70.4 MM (ref 52–70)
RAPIDTEG MAXIMUM AMPLITUDE: SIGNIFICANT CHANGE UP MM (ref 52–70)
RBC # BLD: 1.92 M/UL — LOW (ref 4.2–5.8)
RBC # BLD: 1.94 M/UL — LOW (ref 4.2–5.8)
RBC # BLD: 2 M/UL — LOW (ref 4.2–5.8)
RBC # BLD: 2.05 M/UL — LOW (ref 4.2–5.8)
RBC # BLD: 2.1 M/UL — LOW (ref 4.2–5.8)
RBC # BLD: 2.14 M/UL — LOW (ref 4.2–5.8)
RBC # BLD: 2.14 M/UL — LOW (ref 4.2–5.8)
RBC # BLD: 2.2 M/UL — LOW (ref 4.2–5.8)
RBC # BLD: 2.2 M/UL — LOW (ref 4.2–5.8)
RBC # BLD: 2.25 M/UL — LOW (ref 4.2–5.8)
RBC # BLD: 2.27 M/UL — LOW (ref 4.2–5.8)
RBC # BLD: 2.28 M/UL — LOW (ref 4.2–5.8)
RBC # BLD: 2.33 M/UL — LOW (ref 4.2–5.8)
RBC # BLD: 2.33 M/UL — LOW (ref 4.2–5.8)
RBC # BLD: 2.34 M/UL — LOW (ref 4.2–5.8)
RBC # BLD: 2.36 M/UL — LOW (ref 4.2–5.8)
RBC # BLD: 2.41 M/UL — LOW (ref 4.2–5.8)
RBC # BLD: 2.42 M/UL — LOW (ref 4.2–5.8)
RBC # BLD: 2.44 M/UL — LOW (ref 4.2–5.8)
RBC # BLD: 2.45 M/UL — LOW (ref 4.2–5.8)
RBC # BLD: 2.48 M/UL — LOW (ref 4.2–5.8)
RBC # BLD: 2.48 M/UL — LOW (ref 4.2–5.8)
RBC # BLD: 2.49 M/UL — LOW (ref 4.2–5.8)
RBC # BLD: 2.49 M/UL — LOW (ref 4.2–5.8)
RBC # BLD: 2.5 M/UL — LOW (ref 4.2–5.8)
RBC # BLD: 2.51 M/UL — LOW (ref 4.2–5.8)
RBC # BLD: 2.52 M/UL — LOW (ref 4.2–5.8)
RBC # BLD: 2.54 M/UL — LOW (ref 4.2–5.8)
RBC # BLD: 2.54 M/UL — LOW (ref 4.2–5.8)
RBC # BLD: 2.55 M/UL — LOW (ref 4.2–5.8)
RBC # BLD: 2.56 M/UL — LOW (ref 4.2–5.8)
RBC # BLD: 2.56 M/UL — LOW (ref 4.2–5.8)
RBC # BLD: 2.57 M/UL — LOW (ref 4.2–5.8)
RBC # BLD: 2.58 M/UL — LOW (ref 4.2–5.8)
RBC # BLD: 2.59 M/UL — LOW (ref 4.2–5.8)
RBC # BLD: 2.6 M/UL — LOW (ref 4.2–5.8)
RBC # BLD: 2.6 M/UL — LOW (ref 4.2–5.8)
RBC # BLD: 2.61 M/UL — LOW (ref 4.2–5.8)
RBC # BLD: 2.62 M/UL — LOW (ref 4.2–5.8)
RBC # BLD: 2.62 M/UL — LOW (ref 4.2–5.8)
RBC # BLD: 2.63 M/UL — LOW (ref 4.2–5.8)
RBC # BLD: 2.63 M/UL — LOW (ref 4.2–5.8)
RBC # BLD: 2.64 M/UL — LOW (ref 4.2–5.8)
RBC # BLD: 2.65 M/UL — LOW (ref 4.2–5.8)
RBC # BLD: 2.66 M/UL — LOW (ref 4.2–5.8)
RBC # BLD: 2.67 M/UL
RBC # BLD: 2.67 M/UL — LOW (ref 4.2–5.8)
RBC # BLD: 2.68 M/UL — LOW (ref 4.2–5.8)
RBC # BLD: 2.7 M/UL — LOW (ref 4.2–5.8)
RBC # BLD: 2.73 M/UL — LOW (ref 4.2–5.8)
RBC # BLD: 2.74 M/UL — LOW (ref 4.2–5.8)
RBC # BLD: 2.74 M/UL — LOW (ref 4.2–5.8)
RBC # BLD: 2.75 M/UL — LOW (ref 4.2–5.8)
RBC # BLD: 2.75 M/UL — LOW (ref 4.2–5.8)
RBC # BLD: 2.76 M/UL — LOW (ref 4.2–5.8)
RBC # BLD: 2.77 M/UL — LOW (ref 4.2–5.8)
RBC # BLD: 2.77 M/UL — LOW (ref 4.2–5.8)
RBC # BLD: 2.78 M/UL — LOW (ref 4.2–5.8)
RBC # BLD: 2.79 M/UL — LOW (ref 4.2–5.8)
RBC # BLD: 2.81 M/UL — LOW (ref 4.2–5.8)
RBC # BLD: 2.83 M/UL — LOW (ref 4.2–5.8)
RBC # BLD: 2.84 M/UL — LOW (ref 4.2–5.8)
RBC # BLD: 2.85 M/UL — LOW (ref 4.2–5.8)
RBC # BLD: 2.86 M/UL — LOW (ref 4.2–5.8)
RBC # BLD: 2.87 M/UL — LOW (ref 4.2–5.8)
RBC # BLD: 2.88 M/UL — LOW (ref 4.2–5.8)
RBC # BLD: 2.89 M/UL — LOW (ref 4.2–5.8)
RBC # BLD: 2.89 M/UL — LOW (ref 4.2–5.8)
RBC # BLD: 2.9 M/UL — LOW (ref 4.2–5.8)
RBC # BLD: 2.91 M/UL — LOW (ref 4.2–5.8)
RBC # BLD: 2.92 M/UL — LOW (ref 4.2–5.8)
RBC # BLD: 2.92 M/UL — LOW (ref 4.2–5.8)
RBC # BLD: 2.93 M/UL — LOW (ref 4.2–5.8)
RBC # BLD: 2.94 M/UL — LOW (ref 4.2–5.8)
RBC # BLD: 2.96 M/UL — LOW (ref 4.2–5.8)
RBC # BLD: 2.97 M/UL — LOW (ref 4.2–5.8)
RBC # BLD: 2.97 M/UL — LOW (ref 4.2–5.8)
RBC # BLD: 2.98 M/UL — LOW (ref 4.2–5.8)
RBC # BLD: 2.99 M/UL — LOW (ref 4.2–5.8)
RBC # BLD: 3.01 M/UL — LOW (ref 4.2–5.8)
RBC # BLD: 3.02 M/UL — LOW (ref 4.2–5.8)
RBC # BLD: 3.03 M/UL — LOW (ref 4.2–5.8)
RBC # BLD: 3.04 M/UL — LOW (ref 4.2–5.8)
RBC # BLD: 3.08 M/UL — LOW (ref 4.2–5.8)
RBC # BLD: 3.09 M/UL — LOW (ref 4.2–5.8)
RBC # BLD: 3.09 M/UL — LOW (ref 4.2–5.8)
RBC # BLD: 3.1 M/UL — LOW (ref 4.2–5.8)
RBC # BLD: 3.11 M/UL — LOW (ref 4.2–5.8)
RBC # BLD: 3.11 M/UL — LOW (ref 4.2–5.8)
RBC # BLD: 3.12 M/UL — LOW (ref 4.2–5.8)
RBC # BLD: 3.13 M/UL
RBC # BLD: 3.13 M/UL — LOW (ref 4.2–5.8)
RBC # BLD: 3.14 M/UL — LOW (ref 4.2–5.8)
RBC # BLD: 3.14 M/UL — LOW (ref 4.2–5.8)
RBC # BLD: 3.15 M/UL — LOW (ref 4.2–5.8)
RBC # BLD: 3.15 M/UL — LOW (ref 4.2–5.8)
RBC # BLD: 3.16 M/UL — LOW (ref 4.2–5.8)
RBC # BLD: 3.17 M/UL — LOW (ref 4.2–5.8)
RBC # BLD: 3.18 M/UL — LOW (ref 4.2–5.8)
RBC # BLD: 3.19 M/UL — LOW (ref 4.2–5.8)
RBC # BLD: 3.2 M/UL — LOW (ref 4.2–5.8)
RBC # BLD: 3.23 M/UL — LOW (ref 4.2–5.8)
RBC # BLD: 3.24 M/UL — LOW (ref 4.2–5.8)
RBC # BLD: 3.27 M/UL — LOW (ref 4.2–5.8)
RBC # BLD: 3.29 M/UL — LOW (ref 4.2–5.8)
RBC # BLD: 3.32 M/UL — LOW (ref 4.2–5.8)
RBC # BLD: 3.34 M/UL — LOW (ref 4.2–5.8)
RBC # BLD: 3.34 M/UL — LOW (ref 4.2–5.8)
RBC # BLD: 3.35 M/UL — LOW (ref 4.2–5.8)
RBC # BLD: 3.41 M/UL — LOW (ref 4.2–5.8)
RBC # BLD: 3.45 M/UL
RBC # BLD: 3.45 M/UL — LOW (ref 4.2–5.8)
RBC # BLD: 3.47 M/UL — LOW (ref 4.2–5.8)
RBC # BLD: 3.49 M/UL — LOW (ref 4.2–5.8)
RBC # BLD: 3.52 M/UL — LOW (ref 4.2–5.8)
RBC # BLD: 3.71 M/UL — LOW (ref 4.2–5.8)
RBC # BLD: 3.95 M/UL — LOW (ref 4.2–5.8)
RBC # BLD: 3.99 M/UL — LOW (ref 4.2–5.8)
RBC # CSF: 15 /UL — HIGH (ref 0–0)
RBC # FLD: 14.6 % — HIGH (ref 10.3–14.5)
RBC # FLD: 14.7 % — HIGH (ref 10.3–14.5)
RBC # FLD: 14.8 % — HIGH (ref 10.3–14.5)
RBC # FLD: 15 % — HIGH (ref 10.3–14.5)
RBC # FLD: 15.1 % — HIGH (ref 10.3–14.5)
RBC # FLD: 15.1 % — HIGH (ref 10.3–14.5)
RBC # FLD: 15.2 % — HIGH (ref 10.3–14.5)
RBC # FLD: 15.3 % — HIGH (ref 10.3–14.5)
RBC # FLD: 15.4 %
RBC # FLD: 15.4 %
RBC # FLD: 15.4 % — HIGH (ref 10.3–14.5)
RBC # FLD: 15.6 % — HIGH (ref 10.3–14.5)
RBC # FLD: 15.7 % — HIGH (ref 10.3–14.5)
RBC # FLD: 15.8 %
RBC # FLD: 15.8 % — HIGH (ref 10.3–14.5)
RBC # FLD: 15.9 % — HIGH (ref 10.3–14.5)
RBC # FLD: 16 % — HIGH (ref 10.3–14.5)
RBC # FLD: 16.1 % — HIGH (ref 10.3–14.5)
RBC # FLD: 16.1 % — HIGH (ref 10.3–14.5)
RBC # FLD: 16.2 % — HIGH (ref 10.3–14.5)
RBC # FLD: 16.3 % — HIGH (ref 10.3–14.5)
RBC # FLD: 16.4 % — HIGH (ref 10.3–14.5)
RBC # FLD: 16.5 % — HIGH (ref 10.3–14.5)
RBC # FLD: 16.6 % — HIGH (ref 10.3–14.5)
RBC # FLD: 16.6 % — HIGH (ref 10.3–14.5)
RBC # FLD: 16.7 % — HIGH (ref 10.3–14.5)
RBC # FLD: 16.8 % — HIGH (ref 10.3–14.5)
RBC # FLD: 16.9 % — HIGH (ref 10.3–14.5)
RBC # FLD: 17 % — HIGH (ref 10.3–14.5)
RBC # FLD: 17.1 % — HIGH (ref 10.3–14.5)
RBC # FLD: 17.2 % — HIGH (ref 10.3–14.5)
RBC # FLD: 17.3 % — HIGH (ref 10.3–14.5)
RBC # FLD: 17.4 % — HIGH (ref 10.3–14.5)
RBC # FLD: 17.5 % — HIGH (ref 10.3–14.5)
RBC # FLD: 17.6 % — HIGH (ref 10.3–14.5)
RBC # FLD: 17.7 % — HIGH (ref 10.3–14.5)
RBC # FLD: 17.8 % — HIGH (ref 10.3–14.5)
RBC # FLD: 17.9 % — HIGH (ref 10.3–14.5)
RBC # FLD: 18 % — HIGH (ref 10.3–14.5)
RBC # FLD: 18.1 % — HIGH (ref 10.3–14.5)
RBC # FLD: 18.1 % — HIGH (ref 10.3–14.5)
RBC # FLD: 18.2 % — HIGH (ref 10.3–14.5)
RBC # FLD: 18.3 % — HIGH (ref 10.3–14.5)
RBC # FLD: 18.4 % — HIGH (ref 10.3–14.5)
RBC # FLD: 18.5 % — HIGH (ref 10.3–14.5)
RBC # FLD: 18.6 % — HIGH (ref 10.3–14.5)
RBC # FLD: 18.6 % — HIGH (ref 10.3–14.5)
RBC # FLD: 18.7 % — HIGH (ref 10.3–14.5)
RBC # FLD: 18.7 % — HIGH (ref 10.3–14.5)
RBC # FLD: 18.8 % — HIGH (ref 10.3–14.5)
RBC # FLD: 18.8 % — HIGH (ref 10.3–14.5)
RBC # FLD: 19 % — HIGH (ref 10.3–14.5)
RBC # FLD: 19.6 % — HIGH (ref 10.3–14.5)
RBC # FLD: 19.8 % — HIGH (ref 10.3–14.5)
RBC # FLD: 19.8 % — HIGH (ref 10.3–14.5)
RBC # FLD: 20 % — HIGH (ref 10.3–14.5)
RBC # FLD: 20 % — HIGH (ref 10.3–14.5)
RBC # FLD: 20.1 % — HIGH (ref 10.3–14.5)
RBC # FLD: 20.2 % — HIGH (ref 10.3–14.5)
RBC # FLD: 20.3 % — HIGH (ref 10.3–14.5)
RBC # FLD: 20.3 % — HIGH (ref 10.3–14.5)
RBC # FLD: 20.5 % — HIGH (ref 10.3–14.5)
RBC # FLD: 20.5 % — HIGH (ref 10.3–14.5)
RBC # FLD: 20.8 % — HIGH (ref 10.3–14.5)
RBC # FLD: 20.9 % — HIGH (ref 10.3–14.5)
RBC # FLD: 21 % — HIGH (ref 10.3–14.5)
RBC # FLD: 21.2 % — HIGH (ref 10.3–14.5)
RBC # FLD: 21.3 % — HIGH (ref 10.3–14.5)
RBC # FLD: 21.4 % — HIGH (ref 10.3–14.5)
RBC BLD AUTO: ABNORMAL
RBC BLD AUTO: SIGNIFICANT CHANGE UP
RBC CASTS # UR COMP ASSIST: 1 /HPF — SIGNIFICANT CHANGE UP (ref 0–4)
RBC CASTS # UR COMP ASSIST: 13 /HPF — HIGH (ref 0–4)
RBC CASTS # UR COMP ASSIST: 15 /HPF — HIGH (ref 0–4)
RBC CASTS # UR COMP ASSIST: 2 /HPF — SIGNIFICANT CHANGE UP (ref 0–4)
RBC CASTS # UR COMP ASSIST: 25 /HPF — HIGH (ref 0–4)
RBC CASTS # UR COMP ASSIST: 3 /HPF — SIGNIFICANT CHANGE UP (ref 0–4)
RBC CASTS # UR COMP ASSIST: 3 /HPF — SIGNIFICANT CHANGE UP (ref 0–4)
RBC CASTS # UR COMP ASSIST: 6 /HPF — HIGH (ref 0–4)
RCV VOL RI: HIGH /UL (ref 0–0)
RED BLOOD CELLS URINE: 9 /HPF
RH IG SCN BLD-IMP: POSITIVE — SIGNIFICANT CHANGE UP
RSV RNA NPH QL NAA+NON-PROBE: SIGNIFICANT CHANGE UP
RSV RNA SPEC QL NAA+PROBE: SIGNIFICANT CHANGE UP
RV+EV RNA SPEC QL NAA+PROBE: SIGNIFICANT CHANGE UP
S AUREUS DNA NOSE QL NAA+PROBE: SIGNIFICANT CHANGE UP
SAO2 % BLDV: 41.5 % — LOW (ref 67–88)
SAO2 % BLDV: 44.4 % — LOW (ref 67–88)
SAO2 % BLDV: 46.2 % — LOW (ref 67–88)
SAO2 % BLDV: 53.8 % — SIGNIFICANT CHANGE UP
SAO2 % BLDV: 57 % — SIGNIFICANT CHANGE UP
SAO2 % BLDV: 73.1 % — SIGNIFICANT CHANGE UP (ref 67–88)
SAO2 % BLDV: 78.4 % — SIGNIFICANT CHANGE UP (ref 67–88)
SAO2 % BLDV: 83.3 % — SIGNIFICANT CHANGE UP (ref 67–88)
SAO2 % BLDV: 91.3 % — HIGH (ref 67–88)
SAO2 % BLDV: 92.3 % — HIGH (ref 67–88)
SAO2 % BLDV: 99 % — HIGH (ref 67–88)
SARS-COV-2 RNA SPEC QL NAA+PROBE: SIGNIFICANT CHANGE UP
SCHISTOCYTES BLD QL AUTO: SLIGHT — SIGNIFICANT CHANGE UP
SCHISTOCYTES BLD QL AUTO: SLIGHT — SIGNIFICANT CHANGE UP
SIROLIMUS BLD-MCNC: 2 NG/ML — LOW (ref 4.5–14)
SIROLIMUS BLD-MCNC: 2.2 NG/ML — LOW (ref 4.5–14)
SIROLIMUS BLD-MCNC: 2.6 NG/ML — LOW (ref 4.5–14)
SIROLIMUS BLD-MCNC: 2.8 NG/ML — LOW (ref 4.5–14)
SIROLIMUS BLD-MCNC: 2.8 NG/ML — LOW (ref 4.5–14)
SIROLIMUS BLD-MCNC: 3.1 NG/ML — LOW (ref 4.5–14)
SIROLIMUS BLD-MCNC: 3.2 NG/ML — LOW (ref 4.5–14)
SIROLIMUS BLD-MCNC: 3.4 NG/ML — LOW (ref 4.5–14)
SIROLIMUS BLD-MCNC: 3.4 NG/ML — LOW (ref 4.5–14)
SIROLIMUS BLD-MCNC: 3.5 NG/ML — LOW (ref 4.5–14)
SIROLIMUS BLD-MCNC: 3.5 NG/ML — LOW (ref 4.5–14)
SIROLIMUS BLD-MCNC: 3.6 NG/ML — LOW (ref 4.5–14)
SIROLIMUS BLD-MCNC: 3.8 NG/ML — LOW (ref 4.5–14)
SIROLIMUS BLD-MCNC: 3.8 NG/ML — LOW (ref 4.5–14)
SIROLIMUS BLD-MCNC: 3.9 NG/ML — LOW (ref 4.5–14)
SIROLIMUS BLD-MCNC: 4 NG/ML — LOW (ref 4.5–14)
SIROLIMUS BLD-MCNC: 4 NG/ML — LOW (ref 4.5–14)
SIROLIMUS BLD-MCNC: 4.1 NG/ML — LOW (ref 4.5–14)
SIROLIMUS BLD-MCNC: 4.2 NG/ML — LOW (ref 4.5–14)
SIROLIMUS BLD-MCNC: 4.3 NG/ML — LOW (ref 4.5–14)
SIROLIMUS BLD-MCNC: 4.6 NG/ML — SIGNIFICANT CHANGE UP (ref 4.5–14)
SIROLIMUS BLD-MCNC: 4.7 NG/ML — SIGNIFICANT CHANGE UP (ref 4.5–14)
SIROLIMUS BLD-MCNC: 4.8 NG/ML — SIGNIFICANT CHANGE UP (ref 4.5–14)
SIROLIMUS BLD-MCNC: 5 NG/ML — SIGNIFICANT CHANGE UP (ref 4.5–14)
SIROLIMUS BLD-MCNC: 5 NG/ML — SIGNIFICANT CHANGE UP (ref 4.5–14)
SIROLIMUS BLD-MCNC: 5.1 NG/ML — SIGNIFICANT CHANGE UP (ref 4.5–14)
SIROLIMUS BLD-MCNC: 5.2 NG/ML — SIGNIFICANT CHANGE UP (ref 4.5–14)
SIROLIMUS BLD-MCNC: 5.4 NG/ML — SIGNIFICANT CHANGE UP (ref 4.5–14)
SIROLIMUS BLD-MCNC: 5.4 NG/ML — SIGNIFICANT CHANGE UP (ref 4.5–14)
SIROLIMUS BLD-MCNC: 6.1 NG/ML — SIGNIFICANT CHANGE UP (ref 4.5–14)
SIROLIMUS BLD-MCNC: 6.6 NG/ML — SIGNIFICANT CHANGE UP (ref 4.5–14)
SIROLIMUS BLD-MCNC: 6.7 NG/ML — SIGNIFICANT CHANGE UP (ref 4.5–14)
SIROLIMUS BLD-MCNC: 6.9 NG/ML — SIGNIFICANT CHANGE UP (ref 4.5–14)
SMUDGE CELLS # BLD: PRESENT — SIGNIFICANT CHANGE UP
SODIUM SERPL-SCNC: 124 MMOL/L — LOW (ref 135–145)
SODIUM SERPL-SCNC: 125 MMOL/L — LOW (ref 135–145)
SODIUM SERPL-SCNC: 126 MMOL/L — LOW (ref 135–145)
SODIUM SERPL-SCNC: 127 MMOL/L — LOW (ref 135–145)
SODIUM SERPL-SCNC: 128 MMOL/L — LOW (ref 135–145)
SODIUM SERPL-SCNC: 129 MMOL/L — LOW (ref 135–145)
SODIUM SERPL-SCNC: 130 MMOL/L — LOW (ref 135–145)
SODIUM SERPL-SCNC: 131 MMOL/L — LOW (ref 135–145)
SODIUM SERPL-SCNC: 132 MMOL/L — LOW (ref 135–145)
SODIUM SERPL-SCNC: 133 MMOL/L — LOW (ref 135–145)
SODIUM SERPL-SCNC: 134 MMOL/L
SODIUM SERPL-SCNC: 134 MMOL/L — LOW (ref 135–145)
SODIUM SERPL-SCNC: 135 MMOL/L — SIGNIFICANT CHANGE UP (ref 135–145)
SODIUM SERPL-SCNC: 136 MMOL/L — SIGNIFICANT CHANGE UP (ref 135–145)
SODIUM SERPL-SCNC: 137 MMOL/L
SODIUM SERPL-SCNC: 137 MMOL/L — SIGNIFICANT CHANGE UP (ref 135–145)
SODIUM SERPL-SCNC: 138 MMOL/L — SIGNIFICANT CHANGE UP (ref 135–145)
SODIUM SERPL-SCNC: 139 MMOL/L
SODIUM SERPL-SCNC: 139 MMOL/L — SIGNIFICANT CHANGE UP (ref 135–145)
SODIUM SERPL-SCNC: 140 MMOL/L — SIGNIFICANT CHANGE UP (ref 135–145)
SODIUM SERPL-SCNC: 141 MMOL/L — SIGNIFICANT CHANGE UP (ref 135–145)
SODIUM SERPL-SCNC: 142 MMOL/L — SIGNIFICANT CHANGE UP (ref 135–145)
SODIUM SERPL-SCNC: 143 MMOL/L — SIGNIFICANT CHANGE UP (ref 135–145)
SODIUM SERPL-SCNC: 144 MMOL/L — SIGNIFICANT CHANGE UP (ref 135–145)
SODIUM SERPL-SCNC: 145 MMOL/L — SIGNIFICANT CHANGE UP (ref 135–145)
SODIUM SERPL-SCNC: 146 MMOL/L — HIGH (ref 135–145)
SODIUM SERPL-SCNC: 148 MMOL/L — HIGH (ref 135–145)
SODIUM UR-SCNC: 20 MMOL/L — SIGNIFICANT CHANGE UP
SODIUM UR-SCNC: 40 MMOL/L — SIGNIFICANT CHANGE UP
SODIUM UR-SCNC: 50 MMOL/L — SIGNIFICANT CHANGE UP
SODIUM UR-SCNC: 68 MMOL/L — SIGNIFICANT CHANGE UP
SOURCE HSV 1/2: SIGNIFICANT CHANGE UP
SP GR SPEC: 1.01 — SIGNIFICANT CHANGE UP (ref 1.01–1.02)
SP GR SPEC: 1.02 — SIGNIFICANT CHANGE UP (ref 1.01–1.02)
SPECIFIC GRAVITY URINE: 1.02
SPECIMEN SOURCE: SIGNIFICANT CHANGE UP
SQUAMOUS EPITHELIAL CELLS: 0 /HPF
STRONGYLOIDES AB SER-ACNC: POSITIVE
SURGICAL PATHOLOGY STUDY: SIGNIFICANT CHANGE UP
T GONDII DNA BLD QL NAA+PROBE: SIGNIFICANT CHANGE UP COPIES/ML
T GONDII IGM SER QL: <3 AU/ML — SIGNIFICANT CHANGE UP
T GONDII IGM SER QL: NEGATIVE — SIGNIFICANT CHANGE UP
T PALLIDUM AB TITR SER: NEGATIVE — SIGNIFICANT CHANGE UP
T3 SERPL-MCNC: 43 NG/DL — LOW (ref 80–200)
T4 AB SER-ACNC: 3.9 UG/DL — LOW (ref 4.6–12)
TACROLIMUS SERPL-MCNC: 10.3 NG/ML — SIGNIFICANT CHANGE UP
TACROLIMUS SERPL-MCNC: 11.1 NG/ML — SIGNIFICANT CHANGE UP
TACROLIMUS SERPL-MCNC: 11.6 NG/ML
TACROLIMUS SERPL-MCNC: 12.3 NG/ML
TACROLIMUS SERPL-MCNC: 12.4 NG/ML — SIGNIFICANT CHANGE UP
TACROLIMUS SERPL-MCNC: 12.7 NG/ML
TACROLIMUS SERPL-MCNC: 16.4 NG/ML — SIGNIFICANT CHANGE UP
TACROLIMUS SERPL-MCNC: 2.8 NG/ML — SIGNIFICANT CHANGE UP
TACROLIMUS SERPL-MCNC: 20.8 NG/ML — SIGNIFICANT CHANGE UP
TACROLIMUS SERPL-MCNC: 3.6 NG/ML — SIGNIFICANT CHANGE UP
TACROLIMUS SERPL-MCNC: 4 NG/ML — SIGNIFICANT CHANGE UP
TACROLIMUS SERPL-MCNC: 4.4 NG/ML — SIGNIFICANT CHANGE UP
TACROLIMUS SERPL-MCNC: 4.6 NG/ML — SIGNIFICANT CHANGE UP
TACROLIMUS SERPL-MCNC: 5.2 NG/ML — SIGNIFICANT CHANGE UP
TACROLIMUS SERPL-MCNC: 5.3 NG/ML — SIGNIFICANT CHANGE UP
TACROLIMUS SERPL-MCNC: 6 NG/ML — SIGNIFICANT CHANGE UP
TACROLIMUS SERPL-MCNC: 6.1 NG/ML — SIGNIFICANT CHANGE UP
TACROLIMUS SERPL-MCNC: 6.3 NG/ML — SIGNIFICANT CHANGE UP
TACROLIMUS SERPL-MCNC: 6.3 NG/ML — SIGNIFICANT CHANGE UP
TACROLIMUS SERPL-MCNC: 6.5 NG/ML — SIGNIFICANT CHANGE UP
TACROLIMUS SERPL-MCNC: 7.2 NG/ML — SIGNIFICANT CHANGE UP
TACROLIMUS SERPL-MCNC: 7.4 NG/ML — SIGNIFICANT CHANGE UP
TACROLIMUS SERPL-MCNC: 7.5 NG/ML — SIGNIFICANT CHANGE UP
TACROLIMUS SERPL-MCNC: 7.8 NG/ML — SIGNIFICANT CHANGE UP
TACROLIMUS SERPL-MCNC: 8.6 NG/ML — SIGNIFICANT CHANGE UP
TACROLIMUS SERPL-MCNC: 8.7 NG/ML — SIGNIFICANT CHANGE UP
TACROLIMUS SERPL-MCNC: 8.9 NG/ML — SIGNIFICANT CHANGE UP
TACROLIMUS SERPL-MCNC: 9.8 NG/ML — SIGNIFICANT CHANGE UP
TACROLIMUS SERPL-MCNC: <2 NG/ML — SIGNIFICANT CHANGE UP
TACROLIMUS SERPL-MCNC: <2 NG/ML — SIGNIFICANT CHANGE UP
TARGETS BLD QL SMEAR: SLIGHT — SIGNIFICANT CHANGE UP
TARGETS BLD QL SMEAR: SLIGHT — SIGNIFICANT CHANGE UP
TEG FUNCTIONAL FIBRINOGEN: 36 MM (ref 15–32)
TEG FUNCTIONAL FIBRINOGEN: 39.3 MM (ref 15–32)
TEG FUNCTIONAL FIBRINOGEN: SIGNIFICANT CHANGE UP MM (ref 15–32)
TEG LY30 (LYSIS): 0 % — SIGNIFICANT CHANGE UP (ref 0–2.6)
TEG LY30 (LYSIS): SIGNIFICANT CHANGE UP % (ref 0–2.6)
TEG LY30 (LYSIS): SIGNIFICANT CHANGE UP % (ref 0–2.6)
TEG REACTION TIME: 6.9 MIN — SIGNIFICANT CHANGE UP (ref 4.6–9.1)
TEG REACTION TIME: >17 MIN (ref 4.6–9.1)
TEG REACTION TIME: SIGNIFICANT CHANGE UP MIN (ref 4.6–9.1)
TIBC SERPL-MCNC: 124 UG/DL — LOW (ref 220–430)
TIBC SERPL-MCNC: 171 UG/DL — LOW (ref 220–430)
TIBC SERPL-MCNC: 191 UG/DL — LOW (ref 220–430)
TOTAL NUCLEATED CELL COUNT, BODY FLUID: 71 /UL — SIGNIFICANT CHANGE UP
TOXOPLASMA BY RT-PCR, WHOLE BLOOD RESULT: SIGNIFICANT CHANGE UP COPIES/ML
TRIGL SERPL-MCNC: 118 MG/DL — SIGNIFICANT CHANGE UP
TROPONIN T, HIGH SENSITIVITY RESULT: 160 NG/L — HIGH (ref 0–51)
TROPONIN T, HIGH SENSITIVITY RESULT: 182 NG/L — HIGH (ref 0–51)
TROPONIN T, HIGH SENSITIVITY RESULT: 37 NG/L — SIGNIFICANT CHANGE UP (ref 0–51)
TROPONIN T, HIGH SENSITIVITY RESULT: 37 NG/L — SIGNIFICANT CHANGE UP (ref 0–51)
TROPONIN T, HIGH SENSITIVITY RESULT: 40 NG/L — SIGNIFICANT CHANGE UP (ref 0–51)
TROPONIN T, HIGH SENSITIVITY RESULT: 45 NG/L — SIGNIFICANT CHANGE UP (ref 0–51)
TROPONIN T, HIGH SENSITIVITY RESULT: 52 NG/L — HIGH (ref 0–51)
TROPONIN T, HIGH SENSITIVITY RESULT: 61 NG/L — HIGH (ref 0–51)
TROPONIN T, HIGH SENSITIVITY RESULT: 62 NG/L — HIGH (ref 0–51)
TROPONIN T, HIGH SENSITIVITY RESULT: 72 NG/L — CRITICAL HIGH
TROPONIN T, HIGH SENSITIVITY RESULT: 77 NG/L — CRITICAL HIGH
TSH SERPL-MCNC: 4.69 UIU/ML — HIGH (ref 0.27–4.2)
TUBE TYPE: SIGNIFICANT CHANGE UP
TUBE TYPE: SIGNIFICANT CHANGE UP
UIBC SERPL-MCNC: 111 UG/DL — SIGNIFICANT CHANGE UP (ref 110–370)
UIBC SERPL-MCNC: 136 UG/DL — SIGNIFICANT CHANGE UP (ref 110–370)
UIBC SERPL-MCNC: 154 UG/DL — SIGNIFICANT CHANGE UP (ref 110–370)
URATE SERPL-MCNC: 3.2 MG/DL — LOW (ref 3.4–8.8)
URATE SERPL-MCNC: 3.2 MG/DL — LOW (ref 3.4–8.8)
URATE SERPL-MCNC: 3.6 MG/DL — SIGNIFICANT CHANGE UP (ref 3.4–8.8)
URATE SERPL-MCNC: 5.9 MG/DL
URATE SERPL-MCNC: 6 MG/DL
URATE SERPL-MCNC: 6.2 MG/DL
UROBILINOGEN FLD QL: NEGATIVE — SIGNIFICANT CHANGE UP
UROBILINOGEN URINE: NORMAL
UUN UR-MCNC: 585 MG/DL — SIGNIFICANT CHANGE UP
VALPROATE FREE SERPL-MCNC: 6.2 MG/L — SIGNIFICANT CHANGE UP (ref 4.8–17.3)
VALPROATE FREE SERPL-MCNC: 6.8 MG/L — SIGNIFICANT CHANGE UP (ref 4.8–17.3)
VALPROATE FREE SERPL-MCNC: <4 MG/L — LOW (ref 4.8–17.3)
VALPROATE FREE SERPL-MCNC: <4 MG/L — LOW (ref 4.8–17.3)
VALPROATE SERPL-MCNC: 10 UG/ML — LOW (ref 50–100)
VALPROATE SERPL-MCNC: 11 UG/ML — LOW (ref 50–100)
VALPROATE SERPL-MCNC: 15 UG/ML — LOW (ref 50–100)
VALPROATE SERPL-MCNC: 16 UG/ML — LOW (ref 50–100)
VALPROATE SERPL-MCNC: 18 UG/ML — LOW (ref 50–100)
VALPROATE SERPL-MCNC: 19 UG/ML — LOW (ref 50–100)
VALPROATE SERPL-MCNC: 26 UG/ML — LOW (ref 50–100)
VALPROATE SERPL-MCNC: 27 UG/ML — LOW (ref 50–100)
VALPROATE SERPL-MCNC: 27 UG/ML — LOW (ref 50–100)
VALPROATE SERPL-MCNC: 28 UG/ML — LOW (ref 50–100)
VALPROATE SERPL-MCNC: 31 UG/ML — LOW (ref 50–100)
VALPROATE SERPL-MCNC: 32 UG/ML — LOW (ref 50–100)
VALPROATE SERPL-MCNC: 34 UG/ML — LOW (ref 50–100)
VALPROATE SERPL-MCNC: 69 UG/ML — SIGNIFICANT CHANGE UP (ref 50–100)
VANCOMYCIN FLD-MCNC: 10.4 UG/ML — SIGNIFICANT CHANGE UP
VANCOMYCIN FLD-MCNC: 13.5 UG/ML — SIGNIFICANT CHANGE UP
VANCOMYCIN FLD-MCNC: 13.5 UG/ML — SIGNIFICANT CHANGE UP
VANCOMYCIN FLD-MCNC: 13.7 UG/ML — SIGNIFICANT CHANGE UP
VANCOMYCIN FLD-MCNC: 14.1 UG/ML — SIGNIFICANT CHANGE UP
VANCOMYCIN FLD-MCNC: 14.5 UG/ML — SIGNIFICANT CHANGE UP
VANCOMYCIN FLD-MCNC: 15.6 UG/ML — SIGNIFICANT CHANGE UP
VANCOMYCIN FLD-MCNC: 16.5 UG/ML — SIGNIFICANT CHANGE UP
VANCOMYCIN FLD-MCNC: 16.8 UG/ML — SIGNIFICANT CHANGE UP
VANCOMYCIN FLD-MCNC: 16.9 UG/ML — SIGNIFICANT CHANGE UP
VANCOMYCIN FLD-MCNC: 19.9 UG/ML — SIGNIFICANT CHANGE UP
VANCOMYCIN FLD-MCNC: 20.4 UG/ML — SIGNIFICANT CHANGE UP
VANCOMYCIN FLD-MCNC: 20.8 UG/ML — SIGNIFICANT CHANGE UP
VANCOMYCIN FLD-MCNC: 22.9 UG/ML — SIGNIFICANT CHANGE UP
VANCOMYCIN FLD-MCNC: 4.9 UG/ML — SIGNIFICANT CHANGE UP
VANCOMYCIN FLD-MCNC: 6 UG/ML — SIGNIFICANT CHANGE UP
VANCOMYCIN FLD-MCNC: 7.5 UG/ML — SIGNIFICANT CHANGE UP
VANCOMYCIN FLD-MCNC: 7.8 UG/ML — SIGNIFICANT CHANGE UP
VANCOMYCIN FLD-MCNC: 9.9 UG/ML — SIGNIFICANT CHANGE UP
VANCOMYCIN FLD-MCNC: <4 UG/ML — SIGNIFICANT CHANGE UP
VANCOMYCIN TROUGH SERPL-MCNC: 12.6 UG/ML — SIGNIFICANT CHANGE UP (ref 10–20)
VARIANT LYMPHS # BLD: 3.2 % — SIGNIFICANT CHANGE UP (ref 0–6)
VIRAL LOAD INTERP: NORMAL
VIRAL LOAD LOG: NORMAL LG COP/ML
VIT B1 SERPL-MCNC: 160.8 NMOL/L — SIGNIFICANT CHANGE UP (ref 66.5–200)
VIT B12 SERPL-MCNC: 1157 PG/ML — SIGNIFICANT CHANGE UP (ref 232–1245)
VIT B12 SERPL-MCNC: 729 PG/ML — SIGNIFICANT CHANGE UP (ref 232–1245)
WBC # BLD: 1.72 K/UL — LOW (ref 3.8–10.5)
WBC # BLD: 1.92 K/UL — LOW (ref 3.8–10.5)
WBC # BLD: 10.08 K/UL — SIGNIFICANT CHANGE UP (ref 3.8–10.5)
WBC # BLD: 10.35 K/UL — SIGNIFICANT CHANGE UP (ref 3.8–10.5)
WBC # BLD: 10.44 K/UL — SIGNIFICANT CHANGE UP (ref 3.8–10.5)
WBC # BLD: 10.64 K/UL — HIGH (ref 3.8–10.5)
WBC # BLD: 10.77 K/UL — HIGH (ref 3.8–10.5)
WBC # BLD: 10.79 K/UL — HIGH (ref 3.8–10.5)
WBC # BLD: 10.86 K/UL — HIGH (ref 3.8–10.5)
WBC # BLD: 11.54 K/UL — HIGH (ref 3.8–10.5)
WBC # BLD: 11.62 K/UL — HIGH (ref 3.8–10.5)
WBC # BLD: 12.02 K/UL — HIGH (ref 3.8–10.5)
WBC # BLD: 12.03 K/UL — HIGH (ref 3.8–10.5)
WBC # BLD: 12.45 K/UL — HIGH (ref 3.8–10.5)
WBC # BLD: 12.79 K/UL — HIGH (ref 3.8–10.5)
WBC # BLD: 12.95 K/UL — HIGH (ref 3.8–10.5)
WBC # BLD: 13.27 K/UL — HIGH (ref 3.8–10.5)
WBC # BLD: 13.97 K/UL — HIGH (ref 3.8–10.5)
WBC # BLD: 14.06 K/UL — HIGH (ref 3.8–10.5)
WBC # BLD: 14.22 K/UL — HIGH (ref 3.8–10.5)
WBC # BLD: 14.3 K/UL — HIGH (ref 3.8–10.5)
WBC # BLD: 14.5 K/UL — HIGH (ref 3.8–10.5)
WBC # BLD: 15.19 K/UL — HIGH (ref 3.8–10.5)
WBC # BLD: 15.34 K/UL — HIGH (ref 3.8–10.5)
WBC # BLD: 15.54 K/UL — HIGH (ref 3.8–10.5)
WBC # BLD: 16.28 K/UL — HIGH (ref 3.8–10.5)
WBC # BLD: 17.19 K/UL — HIGH (ref 3.8–10.5)
WBC # BLD: 17.42 K/UL — HIGH (ref 3.8–10.5)
WBC # BLD: 18.47 K/UL — HIGH (ref 3.8–10.5)
WBC # BLD: 19.38 K/UL — HIGH (ref 3.8–10.5)
WBC # BLD: 19.54 K/UL — HIGH (ref 3.8–10.5)
WBC # BLD: 2.02 K/UL — LOW (ref 3.8–10.5)
WBC # BLD: 2.42 K/UL — LOW (ref 3.8–10.5)
WBC # BLD: 2.48 K/UL — LOW (ref 3.8–10.5)
WBC # BLD: 2.57 K/UL — LOW (ref 3.8–10.5)
WBC # BLD: 2.62 K/UL — LOW (ref 3.8–10.5)
WBC # BLD: 2.67 K/UL — LOW (ref 3.8–10.5)
WBC # BLD: 2.71 K/UL — LOW (ref 3.8–10.5)
WBC # BLD: 2.72 K/UL — LOW (ref 3.8–10.5)
WBC # BLD: 2.75 K/UL — LOW (ref 3.8–10.5)
WBC # BLD: 2.82 K/UL — LOW (ref 3.8–10.5)
WBC # BLD: 2.83 K/UL — LOW (ref 3.8–10.5)
WBC # BLD: 2.88 K/UL — LOW (ref 3.8–10.5)
WBC # BLD: 2.94 K/UL — LOW (ref 3.8–10.5)
WBC # BLD: 2.95 K/UL — LOW (ref 3.8–10.5)
WBC # BLD: 2.97 K/UL — LOW (ref 3.8–10.5)
WBC # BLD: 20.62 K/UL — HIGH (ref 3.8–10.5)
WBC # BLD: 22.22 K/UL — HIGH (ref 3.8–10.5)
WBC # BLD: 22.34 K/UL — HIGH (ref 3.8–10.5)
WBC # BLD: 22.92 K/UL — HIGH (ref 3.8–10.5)
WBC # BLD: 24.8 K/UL — HIGH (ref 3.8–10.5)
WBC # BLD: 3.06 K/UL — LOW (ref 3.8–10.5)
WBC # BLD: 3.06 K/UL — LOW (ref 3.8–10.5)
WBC # BLD: 3.12 K/UL — LOW (ref 3.8–10.5)
WBC # BLD: 3.2 K/UL — LOW (ref 3.8–10.5)
WBC # BLD: 3.28 K/UL — LOW (ref 3.8–10.5)
WBC # BLD: 3.4 K/UL — LOW (ref 3.8–10.5)
WBC # BLD: 3.41 K/UL — LOW (ref 3.8–10.5)
WBC # BLD: 3.49 K/UL — LOW (ref 3.8–10.5)
WBC # BLD: 3.52 K/UL — LOW (ref 3.8–10.5)
WBC # BLD: 3.57 K/UL — LOW (ref 3.8–10.5)
WBC # BLD: 3.59 K/UL — LOW (ref 3.8–10.5)
WBC # BLD: 3.66 K/UL — LOW (ref 3.8–10.5)
WBC # BLD: 3.68 K/UL — LOW (ref 3.8–10.5)
WBC # BLD: 3.72 K/UL — LOW (ref 3.8–10.5)
WBC # BLD: 3.78 K/UL — LOW (ref 3.8–10.5)
WBC # BLD: 3.79 K/UL — LOW (ref 3.8–10.5)
WBC # BLD: 3.89 K/UL — SIGNIFICANT CHANGE UP (ref 3.8–10.5)
WBC # BLD: 3.9 K/UL — SIGNIFICANT CHANGE UP (ref 3.8–10.5)
WBC # BLD: 3.94 K/UL — SIGNIFICANT CHANGE UP (ref 3.8–10.5)
WBC # BLD: 3.94 K/UL — SIGNIFICANT CHANGE UP (ref 3.8–10.5)
WBC # BLD: 3.97 K/UL — SIGNIFICANT CHANGE UP (ref 3.8–10.5)
WBC # BLD: 30.6 K/UL — HIGH (ref 3.8–10.5)
WBC # BLD: 4 K/UL — SIGNIFICANT CHANGE UP (ref 3.8–10.5)
WBC # BLD: 4.11 K/UL — SIGNIFICANT CHANGE UP (ref 3.8–10.5)
WBC # BLD: 4.17 K/UL — SIGNIFICANT CHANGE UP (ref 3.8–10.5)
WBC # BLD: 4.21 K/UL — SIGNIFICANT CHANGE UP (ref 3.8–10.5)
WBC # BLD: 4.22 K/UL — SIGNIFICANT CHANGE UP (ref 3.8–10.5)
WBC # BLD: 4.27 K/UL — SIGNIFICANT CHANGE UP (ref 3.8–10.5)
WBC # BLD: 4.28 K/UL — SIGNIFICANT CHANGE UP (ref 3.8–10.5)
WBC # BLD: 4.3 K/UL — SIGNIFICANT CHANGE UP (ref 3.8–10.5)
WBC # BLD: 4.33 K/UL — SIGNIFICANT CHANGE UP (ref 3.8–10.5)
WBC # BLD: 4.41 K/UL — SIGNIFICANT CHANGE UP (ref 3.8–10.5)
WBC # BLD: 4.52 K/UL — SIGNIFICANT CHANGE UP (ref 3.8–10.5)
WBC # BLD: 4.52 K/UL — SIGNIFICANT CHANGE UP (ref 3.8–10.5)
WBC # BLD: 4.61 K/UL — SIGNIFICANT CHANGE UP (ref 3.8–10.5)
WBC # BLD: 4.83 K/UL — SIGNIFICANT CHANGE UP (ref 3.8–10.5)
WBC # BLD: 4.83 K/UL — SIGNIFICANT CHANGE UP (ref 3.8–10.5)
WBC # BLD: 4.88 K/UL — SIGNIFICANT CHANGE UP (ref 3.8–10.5)
WBC # BLD: 4.92 K/UL — SIGNIFICANT CHANGE UP (ref 3.8–10.5)
WBC # BLD: 4.93 K/UL — SIGNIFICANT CHANGE UP (ref 3.8–10.5)
WBC # BLD: 4.98 K/UL — SIGNIFICANT CHANGE UP (ref 3.8–10.5)
WBC # BLD: 5.05 K/UL — SIGNIFICANT CHANGE UP (ref 3.8–10.5)
WBC # BLD: 5.18 K/UL — SIGNIFICANT CHANGE UP (ref 3.8–10.5)
WBC # BLD: 5.24 K/UL — SIGNIFICANT CHANGE UP (ref 3.8–10.5)
WBC # BLD: 5.25 K/UL — SIGNIFICANT CHANGE UP (ref 3.8–10.5)
WBC # BLD: 5.25 K/UL — SIGNIFICANT CHANGE UP (ref 3.8–10.5)
WBC # BLD: 5.28 K/UL — SIGNIFICANT CHANGE UP (ref 3.8–10.5)
WBC # BLD: 5.28 K/UL — SIGNIFICANT CHANGE UP (ref 3.8–10.5)
WBC # BLD: 5.31 K/UL — SIGNIFICANT CHANGE UP (ref 3.8–10.5)
WBC # BLD: 5.54 K/UL — SIGNIFICANT CHANGE UP (ref 3.8–10.5)
WBC # BLD: 5.6 K/UL — SIGNIFICANT CHANGE UP (ref 3.8–10.5)
WBC # BLD: 5.62 K/UL — SIGNIFICANT CHANGE UP (ref 3.8–10.5)
WBC # BLD: 5.63 K/UL — SIGNIFICANT CHANGE UP (ref 3.8–10.5)
WBC # BLD: 5.73 K/UL — SIGNIFICANT CHANGE UP (ref 3.8–10.5)
WBC # BLD: 5.77 K/UL — SIGNIFICANT CHANGE UP (ref 3.8–10.5)
WBC # BLD: 5.77 K/UL — SIGNIFICANT CHANGE UP (ref 3.8–10.5)
WBC # BLD: 5.78 K/UL — SIGNIFICANT CHANGE UP (ref 3.8–10.5)
WBC # BLD: 5.82 K/UL — SIGNIFICANT CHANGE UP (ref 3.8–10.5)
WBC # BLD: 5.84 K/UL — SIGNIFICANT CHANGE UP (ref 3.8–10.5)
WBC # BLD: 5.85 K/UL — SIGNIFICANT CHANGE UP (ref 3.8–10.5)
WBC # BLD: 5.88 K/UL — SIGNIFICANT CHANGE UP (ref 3.8–10.5)
WBC # BLD: 5.91 K/UL — SIGNIFICANT CHANGE UP (ref 3.8–10.5)
WBC # BLD: 5.96 K/UL — SIGNIFICANT CHANGE UP (ref 3.8–10.5)
WBC # BLD: 5.98 K/UL — SIGNIFICANT CHANGE UP (ref 3.8–10.5)
WBC # BLD: 6.07 K/UL — SIGNIFICANT CHANGE UP (ref 3.8–10.5)
WBC # BLD: 6.08 K/UL — SIGNIFICANT CHANGE UP (ref 3.8–10.5)
WBC # BLD: 6.11 K/UL — SIGNIFICANT CHANGE UP (ref 3.8–10.5)
WBC # BLD: 6.15 K/UL — SIGNIFICANT CHANGE UP (ref 3.8–10.5)
WBC # BLD: 6.16 K/UL — SIGNIFICANT CHANGE UP (ref 3.8–10.5)
WBC # BLD: 6.17 K/UL — SIGNIFICANT CHANGE UP (ref 3.8–10.5)
WBC # BLD: 6.25 K/UL — SIGNIFICANT CHANGE UP (ref 3.8–10.5)
WBC # BLD: 6.27 K/UL — SIGNIFICANT CHANGE UP (ref 3.8–10.5)
WBC # BLD: 6.3 K/UL — SIGNIFICANT CHANGE UP (ref 3.8–10.5)
WBC # BLD: 6.31 K/UL — SIGNIFICANT CHANGE UP (ref 3.8–10.5)
WBC # BLD: 6.34 K/UL — SIGNIFICANT CHANGE UP (ref 3.8–10.5)
WBC # BLD: 6.34 K/UL — SIGNIFICANT CHANGE UP (ref 3.8–10.5)
WBC # BLD: 6.41 K/UL — SIGNIFICANT CHANGE UP (ref 3.8–10.5)
WBC # BLD: 6.41 K/UL — SIGNIFICANT CHANGE UP (ref 3.8–10.5)
WBC # BLD: 6.43 K/UL — SIGNIFICANT CHANGE UP (ref 3.8–10.5)
WBC # BLD: 6.45 K/UL — SIGNIFICANT CHANGE UP (ref 3.8–10.5)
WBC # BLD: 6.46 K/UL — SIGNIFICANT CHANGE UP (ref 3.8–10.5)
WBC # BLD: 6.48 K/UL — SIGNIFICANT CHANGE UP (ref 3.8–10.5)
WBC # BLD: 6.54 K/UL — SIGNIFICANT CHANGE UP (ref 3.8–10.5)
WBC # BLD: 6.56 K/UL — SIGNIFICANT CHANGE UP (ref 3.8–10.5)
WBC # BLD: 6.68 K/UL — SIGNIFICANT CHANGE UP (ref 3.8–10.5)
WBC # BLD: 6.75 K/UL — SIGNIFICANT CHANGE UP (ref 3.8–10.5)
WBC # BLD: 6.75 K/UL — SIGNIFICANT CHANGE UP (ref 3.8–10.5)
WBC # BLD: 6.79 K/UL — SIGNIFICANT CHANGE UP (ref 3.8–10.5)
WBC # BLD: 6.85 K/UL — SIGNIFICANT CHANGE UP (ref 3.8–10.5)
WBC # BLD: 6.9 K/UL — SIGNIFICANT CHANGE UP (ref 3.8–10.5)
WBC # BLD: 6.93 K/UL — SIGNIFICANT CHANGE UP (ref 3.8–10.5)
WBC # BLD: 6.93 K/UL — SIGNIFICANT CHANGE UP (ref 3.8–10.5)
WBC # BLD: 6.96 K/UL — SIGNIFICANT CHANGE UP (ref 3.8–10.5)
WBC # BLD: 6.96 K/UL — SIGNIFICANT CHANGE UP (ref 3.8–10.5)
WBC # BLD: 7.13 K/UL — SIGNIFICANT CHANGE UP (ref 3.8–10.5)
WBC # BLD: 7.16 K/UL — SIGNIFICANT CHANGE UP (ref 3.8–10.5)
WBC # BLD: 7.18 K/UL — SIGNIFICANT CHANGE UP (ref 3.8–10.5)
WBC # BLD: 7.24 K/UL — SIGNIFICANT CHANGE UP (ref 3.8–10.5)
WBC # BLD: 7.26 K/UL — SIGNIFICANT CHANGE UP (ref 3.8–10.5)
WBC # BLD: 7.31 K/UL — SIGNIFICANT CHANGE UP (ref 3.8–10.5)
WBC # BLD: 7.31 K/UL — SIGNIFICANT CHANGE UP (ref 3.8–10.5)
WBC # BLD: 7.34 K/UL — SIGNIFICANT CHANGE UP (ref 3.8–10.5)
WBC # BLD: 7.36 K/UL — SIGNIFICANT CHANGE UP (ref 3.8–10.5)
WBC # BLD: 7.42 K/UL — SIGNIFICANT CHANGE UP (ref 3.8–10.5)
WBC # BLD: 7.43 K/UL — SIGNIFICANT CHANGE UP (ref 3.8–10.5)
WBC # BLD: 7.53 K/UL — SIGNIFICANT CHANGE UP (ref 3.8–10.5)
WBC # BLD: 7.59 K/UL — SIGNIFICANT CHANGE UP (ref 3.8–10.5)
WBC # BLD: 7.81 K/UL — SIGNIFICANT CHANGE UP (ref 3.8–10.5)
WBC # BLD: 7.84 K/UL — SIGNIFICANT CHANGE UP (ref 3.8–10.5)
WBC # BLD: 7.91 K/UL — SIGNIFICANT CHANGE UP (ref 3.8–10.5)
WBC # BLD: 7.93 K/UL — SIGNIFICANT CHANGE UP (ref 3.8–10.5)
WBC # BLD: 8.02 K/UL — SIGNIFICANT CHANGE UP (ref 3.8–10.5)
WBC # BLD: 8.03 K/UL — SIGNIFICANT CHANGE UP (ref 3.8–10.5)
WBC # BLD: 8.09 K/UL — SIGNIFICANT CHANGE UP (ref 3.8–10.5)
WBC # BLD: 8.13 K/UL — SIGNIFICANT CHANGE UP (ref 3.8–10.5)
WBC # BLD: 8.14 K/UL — SIGNIFICANT CHANGE UP (ref 3.8–10.5)
WBC # BLD: 8.17 K/UL — SIGNIFICANT CHANGE UP (ref 3.8–10.5)
WBC # BLD: 8.27 K/UL — SIGNIFICANT CHANGE UP (ref 3.8–10.5)
WBC # BLD: 8.43 K/UL — SIGNIFICANT CHANGE UP (ref 3.8–10.5)
WBC # BLD: 8.44 K/UL — SIGNIFICANT CHANGE UP (ref 3.8–10.5)
WBC # BLD: 8.47 K/UL — SIGNIFICANT CHANGE UP (ref 3.8–10.5)
WBC # BLD: 8.53 K/UL — SIGNIFICANT CHANGE UP (ref 3.8–10.5)
WBC # BLD: 8.59 K/UL — SIGNIFICANT CHANGE UP (ref 3.8–10.5)
WBC # BLD: 8.62 K/UL — SIGNIFICANT CHANGE UP (ref 3.8–10.5)
WBC # BLD: 8.62 K/UL — SIGNIFICANT CHANGE UP (ref 3.8–10.5)
WBC # BLD: 8.63 K/UL — SIGNIFICANT CHANGE UP (ref 3.8–10.5)
WBC # BLD: 8.71 K/UL — SIGNIFICANT CHANGE UP (ref 3.8–10.5)
WBC # BLD: 8.81 K/UL — SIGNIFICANT CHANGE UP (ref 3.8–10.5)
WBC # BLD: 8.93 K/UL — SIGNIFICANT CHANGE UP (ref 3.8–10.5)
WBC # BLD: 8.96 K/UL — SIGNIFICANT CHANGE UP (ref 3.8–10.5)
WBC # BLD: 9.09 K/UL — SIGNIFICANT CHANGE UP (ref 3.8–10.5)
WBC # BLD: 9.46 K/UL — SIGNIFICANT CHANGE UP (ref 3.8–10.5)
WBC # BLD: 9.47 K/UL — SIGNIFICANT CHANGE UP (ref 3.8–10.5)
WBC # BLD: 9.64 K/UL — SIGNIFICANT CHANGE UP (ref 3.8–10.5)
WBC # BLD: 9.75 K/UL — SIGNIFICANT CHANGE UP (ref 3.8–10.5)
WBC # BLD: 9.8 K/UL — SIGNIFICANT CHANGE UP (ref 3.8–10.5)
WBC # BLD: 9.81 K/UL — SIGNIFICANT CHANGE UP (ref 3.8–10.5)
WBC # BLD: 9.83 K/UL — SIGNIFICANT CHANGE UP (ref 3.8–10.5)
WBC # BLD: 9.93 K/UL — SIGNIFICANT CHANGE UP (ref 3.8–10.5)
WBC # FLD AUTO: 1.72 K/UL — LOW (ref 3.8–10.5)
WBC # FLD AUTO: 1.92 K/UL — LOW (ref 3.8–10.5)
WBC # FLD AUTO: 10.08 K/UL — SIGNIFICANT CHANGE UP (ref 3.8–10.5)
WBC # FLD AUTO: 10.35 K/UL — SIGNIFICANT CHANGE UP (ref 3.8–10.5)
WBC # FLD AUTO: 10.44 K/UL — SIGNIFICANT CHANGE UP (ref 3.8–10.5)
WBC # FLD AUTO: 10.64 K/UL — HIGH (ref 3.8–10.5)
WBC # FLD AUTO: 10.77 K/UL — HIGH (ref 3.8–10.5)
WBC # FLD AUTO: 10.79 K/UL — HIGH (ref 3.8–10.5)
WBC # FLD AUTO: 10.86 K/UL — HIGH (ref 3.8–10.5)
WBC # FLD AUTO: 11.54 K/UL — HIGH (ref 3.8–10.5)
WBC # FLD AUTO: 11.57 K/UL
WBC # FLD AUTO: 11.62 K/UL — HIGH (ref 3.8–10.5)
WBC # FLD AUTO: 12.02 K/UL — HIGH (ref 3.8–10.5)
WBC # FLD AUTO: 12.03 K/UL — HIGH (ref 3.8–10.5)
WBC # FLD AUTO: 12.45 K/UL — HIGH (ref 3.8–10.5)
WBC # FLD AUTO: 12.79 K/UL — HIGH (ref 3.8–10.5)
WBC # FLD AUTO: 12.95 K/UL — HIGH (ref 3.8–10.5)
WBC # FLD AUTO: 13.27 K/UL — HIGH (ref 3.8–10.5)
WBC # FLD AUTO: 13.97 K/UL — HIGH (ref 3.8–10.5)
WBC # FLD AUTO: 14.06 K/UL — HIGH (ref 3.8–10.5)
WBC # FLD AUTO: 14.22 K/UL — HIGH (ref 3.8–10.5)
WBC # FLD AUTO: 14.3 K/UL — HIGH (ref 3.8–10.5)
WBC # FLD AUTO: 14.5 K/UL — HIGH (ref 3.8–10.5)
WBC # FLD AUTO: 15.19 K/UL — HIGH (ref 3.8–10.5)
WBC # FLD AUTO: 15.34 K/UL — HIGH (ref 3.8–10.5)
WBC # FLD AUTO: 15.54 K/UL — HIGH (ref 3.8–10.5)
WBC # FLD AUTO: 16.28 K/UL — HIGH (ref 3.8–10.5)
WBC # FLD AUTO: 17.19 K/UL — HIGH (ref 3.8–10.5)
WBC # FLD AUTO: 17.42 K/UL — HIGH (ref 3.8–10.5)
WBC # FLD AUTO: 18.47 K/UL — HIGH (ref 3.8–10.5)
WBC # FLD AUTO: 19.38 K/UL — HIGH (ref 3.8–10.5)
WBC # FLD AUTO: 19.54 K/UL — HIGH (ref 3.8–10.5)
WBC # FLD AUTO: 2.02 K/UL — LOW (ref 3.8–10.5)
WBC # FLD AUTO: 2.42 K/UL — LOW (ref 3.8–10.5)
WBC # FLD AUTO: 2.48 K/UL — LOW (ref 3.8–10.5)
WBC # FLD AUTO: 2.57 K/UL — LOW (ref 3.8–10.5)
WBC # FLD AUTO: 2.62 K/UL — LOW (ref 3.8–10.5)
WBC # FLD AUTO: 2.67 K/UL — LOW (ref 3.8–10.5)
WBC # FLD AUTO: 2.71 K/UL — LOW (ref 3.8–10.5)
WBC # FLD AUTO: 2.72 K/UL — LOW (ref 3.8–10.5)
WBC # FLD AUTO: 2.75 K/UL — LOW (ref 3.8–10.5)
WBC # FLD AUTO: 2.82 K/UL — LOW (ref 3.8–10.5)
WBC # FLD AUTO: 2.83 K/UL — LOW (ref 3.8–10.5)
WBC # FLD AUTO: 2.88 K/UL — LOW (ref 3.8–10.5)
WBC # FLD AUTO: 2.94 K/UL — LOW (ref 3.8–10.5)
WBC # FLD AUTO: 2.95 K/UL — LOW (ref 3.8–10.5)
WBC # FLD AUTO: 2.97 K/UL — LOW (ref 3.8–10.5)
WBC # FLD AUTO: 20.62 K/UL — HIGH (ref 3.8–10.5)
WBC # FLD AUTO: 22.22 K/UL — HIGH (ref 3.8–10.5)
WBC # FLD AUTO: 22.34 K/UL — HIGH (ref 3.8–10.5)
WBC # FLD AUTO: 22.92 K/UL — HIGH (ref 3.8–10.5)
WBC # FLD AUTO: 24.8 K/UL — HIGH (ref 3.8–10.5)
WBC # FLD AUTO: 3.06 K/UL — LOW (ref 3.8–10.5)
WBC # FLD AUTO: 3.06 K/UL — LOW (ref 3.8–10.5)
WBC # FLD AUTO: 3.12 K/UL — LOW (ref 3.8–10.5)
WBC # FLD AUTO: 3.2 K/UL — LOW (ref 3.8–10.5)
WBC # FLD AUTO: 3.28 K/UL — LOW (ref 3.8–10.5)
WBC # FLD AUTO: 3.4 K/UL — LOW (ref 3.8–10.5)
WBC # FLD AUTO: 3.41 K/UL — LOW (ref 3.8–10.5)
WBC # FLD AUTO: 3.49 K/UL — LOW (ref 3.8–10.5)
WBC # FLD AUTO: 3.52 K/UL — LOW (ref 3.8–10.5)
WBC # FLD AUTO: 3.57 K/UL — LOW (ref 3.8–10.5)
WBC # FLD AUTO: 3.59 K/UL — LOW (ref 3.8–10.5)
WBC # FLD AUTO: 3.66 K/UL — LOW (ref 3.8–10.5)
WBC # FLD AUTO: 3.68 K/UL — LOW (ref 3.8–10.5)
WBC # FLD AUTO: 3.72 K/UL — LOW (ref 3.8–10.5)
WBC # FLD AUTO: 3.78 K/UL — LOW (ref 3.8–10.5)
WBC # FLD AUTO: 3.79 K/UL — LOW (ref 3.8–10.5)
WBC # FLD AUTO: 3.89 K/UL — SIGNIFICANT CHANGE UP (ref 3.8–10.5)
WBC # FLD AUTO: 3.9 K/UL — SIGNIFICANT CHANGE UP (ref 3.8–10.5)
WBC # FLD AUTO: 3.94 K/UL — SIGNIFICANT CHANGE UP (ref 3.8–10.5)
WBC # FLD AUTO: 3.94 K/UL — SIGNIFICANT CHANGE UP (ref 3.8–10.5)
WBC # FLD AUTO: 3.97 K/UL — SIGNIFICANT CHANGE UP (ref 3.8–10.5)
WBC # FLD AUTO: 30.6 K/UL — HIGH (ref 3.8–10.5)
WBC # FLD AUTO: 4 K/UL — SIGNIFICANT CHANGE UP (ref 3.8–10.5)
WBC # FLD AUTO: 4.11 K/UL — SIGNIFICANT CHANGE UP (ref 3.8–10.5)
WBC # FLD AUTO: 4.17 K/UL — SIGNIFICANT CHANGE UP (ref 3.8–10.5)
WBC # FLD AUTO: 4.21 K/UL — SIGNIFICANT CHANGE UP (ref 3.8–10.5)
WBC # FLD AUTO: 4.22 K/UL — SIGNIFICANT CHANGE UP (ref 3.8–10.5)
WBC # FLD AUTO: 4.27 K/UL — SIGNIFICANT CHANGE UP (ref 3.8–10.5)
WBC # FLD AUTO: 4.28 K/UL — SIGNIFICANT CHANGE UP (ref 3.8–10.5)
WBC # FLD AUTO: 4.3 K/UL — SIGNIFICANT CHANGE UP (ref 3.8–10.5)
WBC # FLD AUTO: 4.33 K/UL — SIGNIFICANT CHANGE UP (ref 3.8–10.5)
WBC # FLD AUTO: 4.41 K/UL — SIGNIFICANT CHANGE UP (ref 3.8–10.5)
WBC # FLD AUTO: 4.52 K/UL — SIGNIFICANT CHANGE UP (ref 3.8–10.5)
WBC # FLD AUTO: 4.52 K/UL — SIGNIFICANT CHANGE UP (ref 3.8–10.5)
WBC # FLD AUTO: 4.61 K/UL — SIGNIFICANT CHANGE UP (ref 3.8–10.5)
WBC # FLD AUTO: 4.83 K/UL — SIGNIFICANT CHANGE UP (ref 3.8–10.5)
WBC # FLD AUTO: 4.83 K/UL — SIGNIFICANT CHANGE UP (ref 3.8–10.5)
WBC # FLD AUTO: 4.88 K/UL — SIGNIFICANT CHANGE UP (ref 3.8–10.5)
WBC # FLD AUTO: 4.92 K/UL — SIGNIFICANT CHANGE UP (ref 3.8–10.5)
WBC # FLD AUTO: 4.93 K/UL — SIGNIFICANT CHANGE UP (ref 3.8–10.5)
WBC # FLD AUTO: 4.98 K/UL — SIGNIFICANT CHANGE UP (ref 3.8–10.5)
WBC # FLD AUTO: 5.05 K/UL — SIGNIFICANT CHANGE UP (ref 3.8–10.5)
WBC # FLD AUTO: 5.18 K/UL — SIGNIFICANT CHANGE UP (ref 3.8–10.5)
WBC # FLD AUTO: 5.24 K/UL — SIGNIFICANT CHANGE UP (ref 3.8–10.5)
WBC # FLD AUTO: 5.25 K/UL — SIGNIFICANT CHANGE UP (ref 3.8–10.5)
WBC # FLD AUTO: 5.25 K/UL — SIGNIFICANT CHANGE UP (ref 3.8–10.5)
WBC # FLD AUTO: 5.28 K/UL — SIGNIFICANT CHANGE UP (ref 3.8–10.5)
WBC # FLD AUTO: 5.28 K/UL — SIGNIFICANT CHANGE UP (ref 3.8–10.5)
WBC # FLD AUTO: 5.31 K/UL — SIGNIFICANT CHANGE UP (ref 3.8–10.5)
WBC # FLD AUTO: 5.54 K/UL — SIGNIFICANT CHANGE UP (ref 3.8–10.5)
WBC # FLD AUTO: 5.6 K/UL — SIGNIFICANT CHANGE UP (ref 3.8–10.5)
WBC # FLD AUTO: 5.62 K/UL — SIGNIFICANT CHANGE UP (ref 3.8–10.5)
WBC # FLD AUTO: 5.63 K/UL — SIGNIFICANT CHANGE UP (ref 3.8–10.5)
WBC # FLD AUTO: 5.73 K/UL — SIGNIFICANT CHANGE UP (ref 3.8–10.5)
WBC # FLD AUTO: 5.77 K/UL — SIGNIFICANT CHANGE UP (ref 3.8–10.5)
WBC # FLD AUTO: 5.77 K/UL — SIGNIFICANT CHANGE UP (ref 3.8–10.5)
WBC # FLD AUTO: 5.78 K/UL — SIGNIFICANT CHANGE UP (ref 3.8–10.5)
WBC # FLD AUTO: 5.82 K/UL — SIGNIFICANT CHANGE UP (ref 3.8–10.5)
WBC # FLD AUTO: 5.84 K/UL — SIGNIFICANT CHANGE UP (ref 3.8–10.5)
WBC # FLD AUTO: 5.85 K/UL — SIGNIFICANT CHANGE UP (ref 3.8–10.5)
WBC # FLD AUTO: 5.88 K/UL — SIGNIFICANT CHANGE UP (ref 3.8–10.5)
WBC # FLD AUTO: 5.91 K/UL — SIGNIFICANT CHANGE UP (ref 3.8–10.5)
WBC # FLD AUTO: 5.96 K/UL — SIGNIFICANT CHANGE UP (ref 3.8–10.5)
WBC # FLD AUTO: 5.98 K/UL — SIGNIFICANT CHANGE UP (ref 3.8–10.5)
WBC # FLD AUTO: 6.07 K/UL — SIGNIFICANT CHANGE UP (ref 3.8–10.5)
WBC # FLD AUTO: 6.08 K/UL — SIGNIFICANT CHANGE UP (ref 3.8–10.5)
WBC # FLD AUTO: 6.11 K/UL — SIGNIFICANT CHANGE UP (ref 3.8–10.5)
WBC # FLD AUTO: 6.15 K/UL — SIGNIFICANT CHANGE UP (ref 3.8–10.5)
WBC # FLD AUTO: 6.16 K/UL — SIGNIFICANT CHANGE UP (ref 3.8–10.5)
WBC # FLD AUTO: 6.17 K/UL — SIGNIFICANT CHANGE UP (ref 3.8–10.5)
WBC # FLD AUTO: 6.25 K/UL — SIGNIFICANT CHANGE UP (ref 3.8–10.5)
WBC # FLD AUTO: 6.27 K/UL — SIGNIFICANT CHANGE UP (ref 3.8–10.5)
WBC # FLD AUTO: 6.3 K/UL — SIGNIFICANT CHANGE UP (ref 3.8–10.5)
WBC # FLD AUTO: 6.31 K/UL — SIGNIFICANT CHANGE UP (ref 3.8–10.5)
WBC # FLD AUTO: 6.34 K/UL — SIGNIFICANT CHANGE UP (ref 3.8–10.5)
WBC # FLD AUTO: 6.34 K/UL — SIGNIFICANT CHANGE UP (ref 3.8–10.5)
WBC # FLD AUTO: 6.41 K/UL — SIGNIFICANT CHANGE UP (ref 3.8–10.5)
WBC # FLD AUTO: 6.41 K/UL — SIGNIFICANT CHANGE UP (ref 3.8–10.5)
WBC # FLD AUTO: 6.43 K/UL — SIGNIFICANT CHANGE UP (ref 3.8–10.5)
WBC # FLD AUTO: 6.45 K/UL — SIGNIFICANT CHANGE UP (ref 3.8–10.5)
WBC # FLD AUTO: 6.46 K/UL
WBC # FLD AUTO: 6.46 K/UL — SIGNIFICANT CHANGE UP (ref 3.8–10.5)
WBC # FLD AUTO: 6.48 K/UL — SIGNIFICANT CHANGE UP (ref 3.8–10.5)
WBC # FLD AUTO: 6.54 K/UL — SIGNIFICANT CHANGE UP (ref 3.8–10.5)
WBC # FLD AUTO: 6.56 K/UL — SIGNIFICANT CHANGE UP (ref 3.8–10.5)
WBC # FLD AUTO: 6.68 K/UL — SIGNIFICANT CHANGE UP (ref 3.8–10.5)
WBC # FLD AUTO: 6.75 K/UL — SIGNIFICANT CHANGE UP (ref 3.8–10.5)
WBC # FLD AUTO: 6.75 K/UL — SIGNIFICANT CHANGE UP (ref 3.8–10.5)
WBC # FLD AUTO: 6.79 K/UL — SIGNIFICANT CHANGE UP (ref 3.8–10.5)
WBC # FLD AUTO: 6.85 K/UL — SIGNIFICANT CHANGE UP (ref 3.8–10.5)
WBC # FLD AUTO: 6.9 K/UL — SIGNIFICANT CHANGE UP (ref 3.8–10.5)
WBC # FLD AUTO: 6.93 K/UL — SIGNIFICANT CHANGE UP (ref 3.8–10.5)
WBC # FLD AUTO: 6.93 K/UL — SIGNIFICANT CHANGE UP (ref 3.8–10.5)
WBC # FLD AUTO: 6.96 K/UL — SIGNIFICANT CHANGE UP (ref 3.8–10.5)
WBC # FLD AUTO: 6.96 K/UL — SIGNIFICANT CHANGE UP (ref 3.8–10.5)
WBC # FLD AUTO: 7.13 K/UL — SIGNIFICANT CHANGE UP (ref 3.8–10.5)
WBC # FLD AUTO: 7.16 K/UL — SIGNIFICANT CHANGE UP (ref 3.8–10.5)
WBC # FLD AUTO: 7.18 K/UL — SIGNIFICANT CHANGE UP (ref 3.8–10.5)
WBC # FLD AUTO: 7.24 K/UL — SIGNIFICANT CHANGE UP (ref 3.8–10.5)
WBC # FLD AUTO: 7.26 K/UL — SIGNIFICANT CHANGE UP (ref 3.8–10.5)
WBC # FLD AUTO: 7.31 K/UL — SIGNIFICANT CHANGE UP (ref 3.8–10.5)
WBC # FLD AUTO: 7.31 K/UL — SIGNIFICANT CHANGE UP (ref 3.8–10.5)
WBC # FLD AUTO: 7.34 K/UL — SIGNIFICANT CHANGE UP (ref 3.8–10.5)
WBC # FLD AUTO: 7.36 K/UL — SIGNIFICANT CHANGE UP (ref 3.8–10.5)
WBC # FLD AUTO: 7.42 K/UL — SIGNIFICANT CHANGE UP (ref 3.8–10.5)
WBC # FLD AUTO: 7.43 K/UL — SIGNIFICANT CHANGE UP (ref 3.8–10.5)
WBC # FLD AUTO: 7.47 K/UL
WBC # FLD AUTO: 7.53 K/UL — SIGNIFICANT CHANGE UP (ref 3.8–10.5)
WBC # FLD AUTO: 7.59 K/UL — SIGNIFICANT CHANGE UP (ref 3.8–10.5)
WBC # FLD AUTO: 7.81 K/UL — SIGNIFICANT CHANGE UP (ref 3.8–10.5)
WBC # FLD AUTO: 7.84 K/UL — SIGNIFICANT CHANGE UP (ref 3.8–10.5)
WBC # FLD AUTO: 7.91 K/UL — SIGNIFICANT CHANGE UP (ref 3.8–10.5)
WBC # FLD AUTO: 7.93 K/UL — SIGNIFICANT CHANGE UP (ref 3.8–10.5)
WBC # FLD AUTO: 8.02 K/UL — SIGNIFICANT CHANGE UP (ref 3.8–10.5)
WBC # FLD AUTO: 8.03 K/UL — SIGNIFICANT CHANGE UP (ref 3.8–10.5)
WBC # FLD AUTO: 8.09 K/UL — SIGNIFICANT CHANGE UP (ref 3.8–10.5)
WBC # FLD AUTO: 8.13 K/UL — SIGNIFICANT CHANGE UP (ref 3.8–10.5)
WBC # FLD AUTO: 8.14 K/UL — SIGNIFICANT CHANGE UP (ref 3.8–10.5)
WBC # FLD AUTO: 8.17 K/UL — SIGNIFICANT CHANGE UP (ref 3.8–10.5)
WBC # FLD AUTO: 8.27 K/UL — SIGNIFICANT CHANGE UP (ref 3.8–10.5)
WBC # FLD AUTO: 8.43 K/UL — SIGNIFICANT CHANGE UP (ref 3.8–10.5)
WBC # FLD AUTO: 8.44 K/UL — SIGNIFICANT CHANGE UP (ref 3.8–10.5)
WBC # FLD AUTO: 8.47 K/UL — SIGNIFICANT CHANGE UP (ref 3.8–10.5)
WBC # FLD AUTO: 8.53 K/UL — SIGNIFICANT CHANGE UP (ref 3.8–10.5)
WBC # FLD AUTO: 8.59 K/UL — SIGNIFICANT CHANGE UP (ref 3.8–10.5)
WBC # FLD AUTO: 8.62 K/UL — SIGNIFICANT CHANGE UP (ref 3.8–10.5)
WBC # FLD AUTO: 8.62 K/UL — SIGNIFICANT CHANGE UP (ref 3.8–10.5)
WBC # FLD AUTO: 8.63 K/UL — SIGNIFICANT CHANGE UP (ref 3.8–10.5)
WBC # FLD AUTO: 8.71 K/UL — SIGNIFICANT CHANGE UP (ref 3.8–10.5)
WBC # FLD AUTO: 8.81 K/UL — SIGNIFICANT CHANGE UP (ref 3.8–10.5)
WBC # FLD AUTO: 8.93 K/UL — SIGNIFICANT CHANGE UP (ref 3.8–10.5)
WBC # FLD AUTO: 8.96 K/UL — SIGNIFICANT CHANGE UP (ref 3.8–10.5)
WBC # FLD AUTO: 9.09 K/UL — SIGNIFICANT CHANGE UP (ref 3.8–10.5)
WBC # FLD AUTO: 9.46 K/UL — SIGNIFICANT CHANGE UP (ref 3.8–10.5)
WBC # FLD AUTO: 9.47 K/UL — SIGNIFICANT CHANGE UP (ref 3.8–10.5)
WBC # FLD AUTO: 9.64 K/UL — SIGNIFICANT CHANGE UP (ref 3.8–10.5)
WBC # FLD AUTO: 9.75 K/UL — SIGNIFICANT CHANGE UP (ref 3.8–10.5)
WBC # FLD AUTO: 9.8 K/UL — SIGNIFICANT CHANGE UP (ref 3.8–10.5)
WBC # FLD AUTO: 9.81 K/UL — SIGNIFICANT CHANGE UP (ref 3.8–10.5)
WBC # FLD AUTO: 9.83 K/UL — SIGNIFICANT CHANGE UP (ref 3.8–10.5)
WBC # FLD AUTO: 9.93 K/UL — SIGNIFICANT CHANGE UP (ref 3.8–10.5)
WBC UR QL: 1 /HPF — SIGNIFICANT CHANGE UP (ref 0–5)
WBC UR QL: 1 /HPF — SIGNIFICANT CHANGE UP (ref 0–5)
WBC UR QL: 161 /HPF — HIGH (ref 0–5)
WBC UR QL: 2 /HPF — SIGNIFICANT CHANGE UP (ref 0–5)
WBC UR QL: 2 /HPF — SIGNIFICANT CHANGE UP (ref 0–5)
WBC UR QL: 40 /HPF — HIGH (ref 0–5)
WBC UR QL: 40 /HPF — HIGH (ref 0–5)
WBC UR QL: 5 /HPF — SIGNIFICANT CHANGE UP (ref 0–5)
WHITE BLOOD CELLS URINE: 60 /HPF
WNV IGG CSF IA-ACNC: NEGATIVE — SIGNIFICANT CHANGE UP
WNV IGM CSF IA-ACNC: NEGATIVE — SIGNIFICANT CHANGE UP

## 2022-01-01 PROCEDURE — 85730 THROMBOPLASTIN TIME PARTIAL: CPT

## 2022-01-01 PROCEDURE — 95718 EEG PHYS/QHP 2-12 HR W/VEEG: CPT

## 2022-01-01 PROCEDURE — 99233 SBSQ HOSP IP/OBS HIGH 50: CPT

## 2022-01-01 PROCEDURE — 82570 ASSAY OF URINE CREATININE: CPT

## 2022-01-01 PROCEDURE — 99232 SBSQ HOSP IP/OBS MODERATE 35: CPT

## 2022-01-01 PROCEDURE — 99231 SBSQ HOSP IP/OBS SF/LOW 25: CPT

## 2022-01-01 PROCEDURE — 82803 BLOOD GASES ANY COMBINATION: CPT

## 2022-01-01 PROCEDURE — 82962 GLUCOSE BLOOD TEST: CPT

## 2022-01-01 PROCEDURE — 93925 LOWER EXTREMITY STUDY: CPT

## 2022-01-01 PROCEDURE — 71045 X-RAY EXAM CHEST 1 VIEW: CPT | Mod: 26,76

## 2022-01-01 PROCEDURE — 99214 OFFICE O/P EST MOD 30 MIN: CPT | Mod: 95

## 2022-01-01 PROCEDURE — 99443: CPT | Mod: 95

## 2022-01-01 PROCEDURE — 95715 VEEG EA 12-26HR INTMT MNTR: CPT

## 2022-01-01 PROCEDURE — 71045 X-RAY EXAM CHEST 1 VIEW: CPT | Mod: 26

## 2022-01-01 PROCEDURE — 85014 HEMATOCRIT: CPT

## 2022-01-01 PROCEDURE — 99261: CPT

## 2022-01-01 PROCEDURE — 76776 US EXAM K TRANSPL W/DOPPLER: CPT | Mod: 26,RT

## 2022-01-01 PROCEDURE — 80177 DRUG SCRN QUAN LEVETIRACETAM: CPT

## 2022-01-01 PROCEDURE — 99223 1ST HOSP IP/OBS HIGH 75: CPT

## 2022-01-01 PROCEDURE — 62270 DX LMBR SPI PNXR: CPT | Mod: GC,79

## 2022-01-01 PROCEDURE — 82330 ASSAY OF CALCIUM: CPT

## 2022-01-01 PROCEDURE — 88304 TISSUE EXAM BY PATHOLOGIST: CPT

## 2022-01-01 PROCEDURE — 71045 X-RAY EXAM CHEST 1 VIEW: CPT | Mod: 26,77

## 2022-01-01 PROCEDURE — 85025 COMPLETE CBC W/AUTO DIFF WBC: CPT

## 2022-01-01 PROCEDURE — 50200 RENAL BIOPSY PERQ: CPT | Mod: RT

## 2022-01-01 PROCEDURE — 87102 FUNGUS ISOLATION CULTURE: CPT

## 2022-01-01 PROCEDURE — 86900 BLOOD TYPING SEROLOGIC ABO: CPT

## 2022-01-01 PROCEDURE — 76942 ECHO GUIDE FOR BIOPSY: CPT

## 2022-01-01 PROCEDURE — 86664 EPSTEIN-BARR NUCLEAR ANTIGEN: CPT

## 2022-01-01 PROCEDURE — 93971 EXTREMITY STUDY: CPT

## 2022-01-01 PROCEDURE — 86923 COMPATIBILITY TEST ELECTRIC: CPT

## 2022-01-01 PROCEDURE — 83540 ASSAY OF IRON: CPT

## 2022-01-01 PROCEDURE — 36589 REMOVAL TUNNELED CV CATH: CPT

## 2022-01-01 PROCEDURE — 82945 GLUCOSE OTHER FLUID: CPT

## 2022-01-01 PROCEDURE — 94640 AIRWAY INHALATION TREATMENT: CPT

## 2022-01-01 PROCEDURE — 99232 SBSQ HOSP IP/OBS MODERATE 35: CPT | Mod: GC

## 2022-01-01 PROCEDURE — 99291 CRITICAL CARE FIRST HOUR: CPT

## 2022-01-01 PROCEDURE — P9016: CPT

## 2022-01-01 PROCEDURE — 71045 X-RAY EXAM CHEST 1 VIEW: CPT

## 2022-01-01 PROCEDURE — 99232 SBSQ HOSP IP/OBS MODERATE 35: CPT | Mod: FS

## 2022-01-01 PROCEDURE — 36556 INSERT NON-TUNNEL CV CATH: CPT

## 2022-01-01 PROCEDURE — 88312 SPECIAL STAINS GROUP 1: CPT | Mod: 26

## 2022-01-01 PROCEDURE — 43239 EGD BIOPSY SINGLE/MULTIPLE: CPT

## 2022-01-01 PROCEDURE — 99233 SBSQ HOSP IP/OBS HIGH 50: CPT | Mod: GC

## 2022-01-01 PROCEDURE — 86663 EPSTEIN-BARR ANTIBODY: CPT

## 2022-01-01 PROCEDURE — 93010 ELECTROCARDIOGRAM REPORT: CPT

## 2022-01-01 PROCEDURE — 99233 SBSQ HOSP IP/OBS HIGH 50: CPT | Mod: FS

## 2022-01-01 PROCEDURE — 50200 RENAL BIOPSY PERQ: CPT

## 2022-01-01 PROCEDURE — A9585: CPT

## 2022-01-01 PROCEDURE — 36415 COLL VENOUS BLD VENIPUNCTURE: CPT

## 2022-01-01 PROCEDURE — 86140 C-REACTIVE PROTEIN: CPT

## 2022-01-01 PROCEDURE — 95720 EEG PHY/QHP EA INCR W/VEEG: CPT

## 2022-01-01 PROCEDURE — 99291 CRITICAL CARE FIRST HOUR: CPT | Mod: FS

## 2022-01-01 PROCEDURE — 85027 COMPLETE CBC AUTOMATED: CPT

## 2022-01-01 PROCEDURE — 83550 IRON BINDING TEST: CPT

## 2022-01-01 PROCEDURE — 99285 EMERGENCY DEPT VISIT HI MDM: CPT | Mod: 25

## 2022-01-01 PROCEDURE — 87075 CULTR BACTERIA EXCEPT BLOOD: CPT

## 2022-01-01 PROCEDURE — 83010 ASSAY OF HAPTOGLOBIN QUANT: CPT

## 2022-01-01 PROCEDURE — 76937 US GUIDE VASCULAR ACCESS: CPT | Mod: 26

## 2022-01-01 PROCEDURE — 97110 THERAPEUTIC EXERCISES: CPT

## 2022-01-01 PROCEDURE — 83615 LACTATE (LD) (LDH) ENZYME: CPT

## 2022-01-01 PROCEDURE — 76705 ECHO EXAM OF ABDOMEN: CPT

## 2022-01-01 PROCEDURE — 99222 1ST HOSP IP/OBS MODERATE 55: CPT

## 2022-01-01 PROCEDURE — 84155 ASSAY OF PROTEIN SERUM: CPT

## 2022-01-01 PROCEDURE — 99213 OFFICE O/P EST LOW 20 MIN: CPT | Mod: 95

## 2022-01-01 PROCEDURE — 82435 ASSAY OF BLOOD CHLORIDE: CPT

## 2022-01-01 PROCEDURE — 84132 ASSAY OF SERUM POTASSIUM: CPT

## 2022-01-01 PROCEDURE — 80048 BASIC METABOLIC PNL TOTAL CA: CPT

## 2022-01-01 PROCEDURE — U0003: CPT

## 2022-01-01 PROCEDURE — C1889: CPT

## 2022-01-01 PROCEDURE — 70450 CT HEAD/BRAIN W/O DYE: CPT | Mod: 26

## 2022-01-01 PROCEDURE — 87086 URINE CULTURE/COLONY COUNT: CPT

## 2022-01-01 PROCEDURE — 88346 IMFLUOR 1ST 1ANTB STAIN PX: CPT | Mod: 26

## 2022-01-01 PROCEDURE — 85610 PROTHROMBIN TIME: CPT

## 2022-01-01 PROCEDURE — 87476 LYME DIS DNA AMP PROBE: CPT

## 2022-01-01 PROCEDURE — 87186 SC STD MICRODIL/AGAR DIL: CPT

## 2022-01-01 PROCEDURE — 88313 SPECIAL STAINS GROUP 2: CPT

## 2022-01-01 PROCEDURE — 82272 OCCULT BLD FECES 1-3 TESTS: CPT

## 2022-01-01 PROCEDURE — 93971 EXTREMITY STUDY: CPT | Mod: 26,RT

## 2022-01-01 PROCEDURE — 77001 FLUOROGUIDE FOR VEIN DEVICE: CPT

## 2022-01-01 PROCEDURE — 71250 CT THORAX DX C-: CPT | Mod: 26

## 2022-01-01 PROCEDURE — 88305 TISSUE EXAM BY PATHOLOGIST: CPT

## 2022-01-01 PROCEDURE — 99239 HOSP IP/OBS DSCHRG MGMT >30: CPT

## 2022-01-01 PROCEDURE — 92610 EVALUATE SWALLOWING FUNCTION: CPT

## 2022-01-01 PROCEDURE — 82607 VITAMIN B-12: CPT

## 2022-01-01 PROCEDURE — 90935 HEMODIALYSIS ONE EVALUATION: CPT | Mod: GC

## 2022-01-01 PROCEDURE — C9399: CPT

## 2022-01-01 PROCEDURE — 36430 TRANSFUSION BLD/BLD COMPNT: CPT

## 2022-01-01 PROCEDURE — 87015 SPECIMEN INFECT AGNT CONCNTJ: CPT

## 2022-01-01 PROCEDURE — 77001 FLUOROGUIDE FOR VEIN DEVICE: CPT | Mod: 26

## 2022-01-01 PROCEDURE — 82728 ASSAY OF FERRITIN: CPT

## 2022-01-01 PROCEDURE — 90935 HEMODIALYSIS ONE EVALUATION: CPT

## 2022-01-01 PROCEDURE — 88341 IMHCHEM/IMCYTCHM EA ADD ANTB: CPT | Mod: 26

## 2022-01-01 PROCEDURE — 82553 CREATINE MB FRACTION: CPT

## 2022-01-01 PROCEDURE — 84443 ASSAY THYROID STIM HORMONE: CPT

## 2022-01-01 PROCEDURE — 84478 ASSAY OF TRIGLYCERIDES: CPT

## 2022-01-01 PROCEDURE — 50360 RNL ALTRNSPLJ W/O RCP NFRCT: CPT | Mod: GC

## 2022-01-01 PROCEDURE — 97112 NEUROMUSCULAR REEDUCATION: CPT

## 2022-01-01 PROCEDURE — 82140 ASSAY OF AMMONIA: CPT

## 2022-01-01 PROCEDURE — 80169 DRUG ASSAY EVEROLIMUS: CPT

## 2022-01-01 PROCEDURE — 86788 WEST NILE VIRUS AB IGM: CPT

## 2022-01-01 PROCEDURE — 83090 ASSAY OF HOMOCYSTEINE: CPT

## 2022-01-01 PROCEDURE — 88313 SPECIAL STAINS GROUP 2: CPT | Mod: 26

## 2022-01-01 PROCEDURE — 82947 ASSAY GLUCOSE BLOOD QUANT: CPT

## 2022-01-01 PROCEDURE — 86747 PARVOVIRUS ANTIBODY: CPT

## 2022-01-01 PROCEDURE — 86706 HEP B SURFACE ANTIBODY: CPT

## 2022-01-01 PROCEDURE — 50430 NJX PX NFROSGRM &/URTRGRM: CPT

## 2022-01-01 PROCEDURE — 84480 ASSAY TRIIODOTHYRONINE (T3): CPT

## 2022-01-01 PROCEDURE — 97535 SELF CARE MNGMENT TRAINING: CPT

## 2022-01-01 PROCEDURE — 50205 RENAL BX SURG EXPOSURE KDN: CPT | Mod: 78,GC

## 2022-01-01 PROCEDURE — 97530 THERAPEUTIC ACTIVITIES: CPT

## 2022-01-01 PROCEDURE — 71250 CT THORAX DX C-: CPT

## 2022-01-01 PROCEDURE — 88350 IMFLUOR EA ADDL 1ANTB STN PX: CPT

## 2022-01-01 PROCEDURE — 70553 MRI BRAIN STEM W/O & W/DYE: CPT

## 2022-01-01 PROCEDURE — 86901 BLOOD TYPING SEROLOGIC RH(D): CPT

## 2022-01-01 PROCEDURE — P9011: CPT

## 2022-01-01 PROCEDURE — 76937 US GUIDE VASCULAR ACCESS: CPT

## 2022-01-01 PROCEDURE — 84145 PROCALCITONIN (PCT): CPT

## 2022-01-01 PROCEDURE — 80074 ACUTE HEPATITIS PANEL: CPT

## 2022-01-01 PROCEDURE — 76776 US EXAM K TRANSPL W/DOPPLER: CPT

## 2022-01-01 PROCEDURE — 97165 OT EVAL LOW COMPLEX 30 MIN: CPT

## 2022-01-01 PROCEDURE — C1769: CPT

## 2022-01-01 PROCEDURE — 99232 SBSQ HOSP IP/OBS MODERATE 35: CPT | Mod: GC,25

## 2022-01-01 PROCEDURE — 86850 RBC ANTIBODY SCREEN: CPT

## 2022-01-01 PROCEDURE — P9039: CPT

## 2022-01-01 PROCEDURE — 82746 ASSAY OF FOLIC ACID SERUM: CPT

## 2022-01-01 PROCEDURE — 74176 CT ABD & PELVIS W/O CONTRAST: CPT | Mod: 26

## 2022-01-01 PROCEDURE — 84165 PROTEIN E-PHORESIS SERUM: CPT

## 2022-01-01 PROCEDURE — 99285 EMERGENCY DEPT VISIT HI MDM: CPT

## 2022-01-01 PROCEDURE — G0463: CPT

## 2022-01-01 PROCEDURE — 82310 ASSAY OF CALCIUM: CPT

## 2022-01-01 PROCEDURE — 80053 COMPREHEN METABOLIC PANEL: CPT

## 2022-01-01 PROCEDURE — 80195 ASSAY OF SIROLIMUS: CPT

## 2022-01-01 PROCEDURE — 82550 ASSAY OF CK (CPK): CPT

## 2022-01-01 PROCEDURE — 83735 ASSAY OF MAGNESIUM: CPT

## 2022-01-01 PROCEDURE — 88305 TISSUE EXAM BY PATHOLOGIST: CPT | Mod: 26

## 2022-01-01 PROCEDURE — 80197 ASSAY OF TACROLIMUS: CPT

## 2022-01-01 PROCEDURE — 96374 THER/PROPH/DIAG INJ IV PUSH: CPT

## 2022-01-01 PROCEDURE — 82565 ASSAY OF CREATININE: CPT

## 2022-01-01 PROCEDURE — 84295 ASSAY OF SERUM SODIUM: CPT

## 2022-01-01 PROCEDURE — 97163 PT EVAL HIGH COMPLEX 45 MIN: CPT

## 2022-01-01 PROCEDURE — 87483 CNS DNA AMP PROBE TYPE 12-25: CPT

## 2022-01-01 PROCEDURE — 80202 ASSAY OF VANCOMYCIN: CPT

## 2022-01-01 PROCEDURE — 83921 ORGANIC ACID SINGLE QUANT: CPT

## 2022-01-01 PROCEDURE — 82436 ASSAY OF URINE CHLORIDE: CPT

## 2022-01-01 PROCEDURE — 99213 OFFICE O/P EST LOW 20 MIN: CPT

## 2022-01-01 PROCEDURE — 93971 EXTREMITY STUDY: CPT | Mod: 26,LT

## 2022-01-01 PROCEDURE — 86704 HEP B CORE ANTIBODY TOTAL: CPT

## 2022-01-01 PROCEDURE — 88342 IMHCHEM/IMCYTCHM 1ST ANTB: CPT | Mod: 26,59

## 2022-01-01 PROCEDURE — 84157 ASSAY OF PROTEIN OTHER: CPT

## 2022-01-01 PROCEDURE — 87635 SARS-COV-2 COVID-19 AMP PRB: CPT

## 2022-01-01 PROCEDURE — 86606 ASPERGILLUS ANTIBODY: CPT

## 2022-01-01 PROCEDURE — 32551 INSERTION OF CHEST TUBE: CPT

## 2022-01-01 PROCEDURE — 94003 VENT MGMT INPAT SUBQ DAY: CPT

## 2022-01-01 PROCEDURE — 96375 TX/PRO/DX INJ NEW DRUG ADDON: CPT

## 2022-01-01 PROCEDURE — 93005 ELECTROCARDIOGRAM TRACING: CPT

## 2022-01-01 PROCEDURE — 32651 THORACOSCOPY REMOVE CORTEX: CPT

## 2022-01-01 PROCEDURE — 36580 REPLACE CVAD CATH: CPT

## 2022-01-01 PROCEDURE — 36556 INSERT NON-TUNNEL CV CATH: CPT | Mod: LT

## 2022-01-01 PROCEDURE — 83520 IMMUNOASSAY QUANT NOS NONAB: CPT

## 2022-01-01 PROCEDURE — C9803: CPT

## 2022-01-01 PROCEDURE — 89051 BODY FLUID CELL COUNT: CPT

## 2022-01-01 PROCEDURE — 50605 INSERT URETERAL SUPPORT: CPT | Mod: GC

## 2022-01-01 PROCEDURE — 99215 OFFICE O/P EST HI 40 MIN: CPT | Mod: 95

## 2022-01-01 PROCEDURE — 76770 US EXAM ABDO BACK WALL COMP: CPT | Mod: 26

## 2022-01-01 PROCEDURE — 31500 INSERT EMERGENCY AIRWAY: CPT

## 2022-01-01 PROCEDURE — 83880 ASSAY OF NATRIURETIC PEPTIDE: CPT

## 2022-01-01 PROCEDURE — 74230 X-RAY XM SWLNG FUNCJ C+: CPT | Mod: 26

## 2022-01-01 PROCEDURE — 84550 ASSAY OF BLOOD/URIC ACID: CPT

## 2022-01-01 PROCEDURE — 70450 CT HEAD/BRAIN W/O DYE: CPT | Mod: 26,MA

## 2022-01-01 PROCEDURE — 93970 EXTREMITY STUDY: CPT | Mod: 26,59

## 2022-01-01 PROCEDURE — 85045 AUTOMATED RETICULOCYTE COUNT: CPT

## 2022-01-01 PROCEDURE — 87206 SMEAR FLUORESCENT/ACID STAI: CPT

## 2022-01-01 PROCEDURE — 84146 ASSAY OF PROLACTIN: CPT

## 2022-01-01 PROCEDURE — 72125 CT NECK SPINE W/O DYE: CPT | Mod: 26,MA

## 2022-01-01 PROCEDURE — 86682 HELMINTH ANTIBODY: CPT

## 2022-01-01 PROCEDURE — U0005: CPT

## 2022-01-01 PROCEDURE — 83605 ASSAY OF LACTIC ACID: CPT

## 2022-01-01 PROCEDURE — 88348 ELECTRON MICROSCOPY DX: CPT

## 2022-01-01 PROCEDURE — 97542 WHEELCHAIR MNGMENT TRAINING: CPT

## 2022-01-01 PROCEDURE — 87040 BLOOD CULTURE FOR BACTERIA: CPT

## 2022-01-01 PROCEDURE — 86334 IMMUNOFIX E-PHORESIS SERUM: CPT

## 2022-01-01 PROCEDURE — 87150 DNA/RNA AMPLIFIED PROBE: CPT

## 2022-01-01 PROCEDURE — 87205 SMEAR GRAM STAIN: CPT

## 2022-01-01 PROCEDURE — 82784 ASSAY IGA/IGD/IGG/IGM EACH: CPT

## 2022-01-01 PROCEDURE — 87522 HEPATITIS C REVRS TRNSCRPJ: CPT

## 2022-01-01 PROCEDURE — 88348 ELECTRON MICROSCOPY DX: CPT | Mod: 26

## 2022-01-01 PROCEDURE — 83935 ASSAY OF URINE OSMOLALITY: CPT

## 2022-01-01 PROCEDURE — 93306 TTE W/DOPPLER COMPLETE: CPT | Mod: 26

## 2022-01-01 PROCEDURE — 88312 SPECIAL STAINS GROUP 1: CPT

## 2022-01-01 PROCEDURE — 97161 PT EVAL LOW COMPLEX 20 MIN: CPT

## 2022-01-01 PROCEDURE — 50430 NJX PX NFROSGRM &/URTRGRM: CPT | Mod: RT

## 2022-01-01 PROCEDURE — 93010 ELECTROCARDIOGRAM REPORT: CPT | Mod: 59

## 2022-01-01 PROCEDURE — 85018 HEMOGLOBIN: CPT

## 2022-01-01 PROCEDURE — 88341 IMHCHEM/IMCYTCHM EA ADD ANTB: CPT

## 2022-01-01 PROCEDURE — 99024 POSTOP FOLLOW-UP VISIT: CPT

## 2022-01-01 PROCEDURE — 87529 HSV DNA AMP PROBE: CPT

## 2022-01-01 PROCEDURE — 99215 OFFICE O/P EST HI 40 MIN: CPT

## 2022-01-01 PROCEDURE — P9040: CPT

## 2022-01-01 PROCEDURE — 84540 ASSAY OF URINE/UREA-N: CPT

## 2022-01-01 PROCEDURE — 73030 X-RAY EXAM OF SHOULDER: CPT | Mod: 26,RT

## 2022-01-01 PROCEDURE — 97116 GAIT TRAINING THERAPY: CPT

## 2022-01-01 PROCEDURE — 80076 HEPATIC FUNCTION PANEL: CPT

## 2022-01-01 PROCEDURE — 87641 MR-STAPH DNA AMP PROBE: CPT

## 2022-01-01 PROCEDURE — 70551 MRI BRAIN STEM W/O DYE: CPT

## 2022-01-01 PROCEDURE — 90945 DIALYSIS ONE EVALUATION: CPT | Mod: GC

## 2022-01-01 PROCEDURE — 80185 ASSAY OF PHENYTOIN TOTAL: CPT

## 2022-01-01 PROCEDURE — 88350 IMFLUOR EA ADDL 1ANTB STN PX: CPT | Mod: 26

## 2022-01-01 PROCEDURE — C2617: CPT

## 2022-01-01 PROCEDURE — 73630 X-RAY EXAM OF FOOT: CPT | Mod: 26,LT

## 2022-01-01 PROCEDURE — 99291 CRITICAL CARE FIRST HOUR: CPT | Mod: FT,CS,25,GC

## 2022-01-01 PROCEDURE — 82955 ASSAY OF G6PD ENZYME: CPT

## 2022-01-01 PROCEDURE — 83036 HEMOGLOBIN GLYCOSYLATED A1C: CPT

## 2022-01-01 PROCEDURE — 99223 1ST HOSP IP/OBS HIGH 75: CPT | Mod: GC

## 2022-01-01 PROCEDURE — 87305 ASPERGILLUS AG IA: CPT

## 2022-01-01 PROCEDURE — 86603 ADENOVIRUS ANTIBODY: CPT

## 2022-01-01 PROCEDURE — 84300 ASSAY OF URINE SODIUM: CPT

## 2022-01-01 PROCEDURE — 93975 VASCULAR STUDY: CPT | Mod: 26

## 2022-01-01 PROCEDURE — 88112 CYTOPATH CELL ENHANCE TECH: CPT | Mod: 26

## 2022-01-01 PROCEDURE — 87799 DETECT AGENT NOS DNA QUANT: CPT

## 2022-01-01 PROCEDURE — 85396 CLOTTING ASSAY WHOLE BLOOD: CPT

## 2022-01-01 PROCEDURE — 10140 I&D HMTMA SEROMA/FLUID COLLJ: CPT | Mod: 78,GC

## 2022-01-01 PROCEDURE — 86696 HERPES SIMPLEX TYPE 2 TEST: CPT

## 2022-01-01 PROCEDURE — 93970 EXTREMITY STUDY: CPT

## 2022-01-01 PROCEDURE — C1752: CPT

## 2022-01-01 PROCEDURE — 76942 ECHO GUIDE FOR BIOPSY: CPT | Mod: 26,59

## 2022-01-01 PROCEDURE — 74018 RADEX ABDOMEN 1 VIEW: CPT | Mod: 26

## 2022-01-01 PROCEDURE — 83625 ASSAY OF LDH ENZYMES: CPT

## 2022-01-01 PROCEDURE — 86780 TREPONEMA PALLIDUM: CPT

## 2022-01-01 PROCEDURE — 83970 ASSAY OF PARATHORMONE: CPT

## 2022-01-01 PROCEDURE — 99232 SBSQ HOSP IP/OBS MODERATE 35: CPT | Mod: FS,GC,24

## 2022-01-01 PROCEDURE — 88112 CYTOPATH CELL ENHANCE TECH: CPT

## 2022-01-01 PROCEDURE — 92507 TX SP LANG VOICE COMM INDIV: CPT

## 2022-01-01 PROCEDURE — 86803 HEPATITIS C AB TEST: CPT

## 2022-01-01 PROCEDURE — 86778 TOXOPLASMA ANTIBODY IGM: CPT

## 2022-01-01 PROCEDURE — 86665 EPSTEIN-BARR CAPSID VCA: CPT

## 2022-01-01 PROCEDURE — 36558 INSERT TUNNELED CV CATH: CPT

## 2022-01-01 PROCEDURE — 87533 HHV-6 DNA QUANT: CPT

## 2022-01-01 PROCEDURE — 99310 SBSQ NF CARE HIGH MDM 45: CPT

## 2022-01-01 PROCEDURE — 84100 ASSAY OF PHOSPHORUS: CPT

## 2022-01-01 PROCEDURE — 88346 IMFLUOR 1ST 1ANTB STAIN PX: CPT

## 2022-01-01 PROCEDURE — 93975 VASCULAR STUDY: CPT

## 2022-01-01 PROCEDURE — 85384 FIBRINOGEN ACTIVITY: CPT

## 2022-01-01 PROCEDURE — 88342 IMHCHEM/IMCYTCHM 1ST ANTB: CPT | Mod: 26

## 2022-01-01 PROCEDURE — 86789 WEST NILE VIRUS ANTIBODY: CPT

## 2022-01-01 PROCEDURE — 76705 ECHO EXAM OF ABDOMEN: CPT | Mod: 26,59

## 2022-01-01 PROCEDURE — 97162 PT EVAL MOD COMPLEX 30 MIN: CPT

## 2022-01-01 PROCEDURE — 74230 X-RAY XM SWLNG FUNCJ C+: CPT

## 2022-01-01 PROCEDURE — 74176 CT ABD & PELVIS W/O CONTRAST: CPT

## 2022-01-01 PROCEDURE — 99285 EMERGENCY DEPT VISIT HI MDM: CPT | Mod: GC

## 2022-01-01 PROCEDURE — 87077 CULTURE AEROBIC IDENTIFY: CPT

## 2022-01-01 PROCEDURE — 99231 SBSQ HOSP IP/OBS SF/LOW 25: CPT | Mod: FS

## 2022-01-01 PROCEDURE — 35221 RPR BLD VSL DIR INTRA-ABDL: CPT | Mod: 78,GC

## 2022-01-01 PROCEDURE — 87389 HIV-1 AG W/HIV-1&-2 AB AG IA: CPT

## 2022-01-01 PROCEDURE — 86695 HERPES SIMPLEX TYPE 1 TEST: CPT

## 2022-01-01 PROCEDURE — 99214 OFFICE O/P EST MOD 30 MIN: CPT

## 2022-01-01 PROCEDURE — 83986 ASSAY PH BODY FLUID NOS: CPT

## 2022-01-01 PROCEDURE — 99204 OFFICE O/P NEW MOD 45 MIN: CPT

## 2022-01-01 PROCEDURE — 84484 ASSAY OF TROPONIN QUANT: CPT

## 2022-01-01 PROCEDURE — 99291 CRITICAL CARE FIRST HOUR: CPT | Mod: 25

## 2022-01-01 PROCEDURE — 95700 EEG CONT REC W/VID EEG TECH: CPT

## 2022-01-01 PROCEDURE — 99283 EMERGENCY DEPT VISIT LOW MDM: CPT | Mod: 25

## 2022-01-01 PROCEDURE — 87340 HEPATITIS B SURFACE AG IA: CPT

## 2022-01-01 PROCEDURE — C9254: CPT

## 2022-01-01 PROCEDURE — 87517 HEPATITIS B DNA QUANT: CPT

## 2022-01-01 PROCEDURE — 87070 CULTURE OTHR SPECIMN AEROBIC: CPT

## 2022-01-01 PROCEDURE — 12001 RPR S/N/AX/GEN/TRNK 2.5CM/<: CPT

## 2022-01-01 PROCEDURE — 97140 MANUAL THERAPY 1/> REGIONS: CPT

## 2022-01-01 PROCEDURE — 86403 PARTICLE AGGLUT ANTBDY SCRN: CPT

## 2022-01-01 PROCEDURE — 84156 ASSAY OF PROTEIN URINE: CPT

## 2022-01-01 PROCEDURE — 95711 VEEG 2-12 HR UNMONITORED: CPT

## 2022-01-01 PROCEDURE — C1894: CPT

## 2022-01-01 PROCEDURE — 92611 MOTION FLUOROSCOPY/SWALLOW: CPT

## 2022-01-01 PROCEDURE — 93976 VASCULAR STUDY: CPT | Mod: 26

## 2022-01-01 PROCEDURE — 52310 CYSTOSCOPY AND TREATMENT: CPT | Mod: 58

## 2022-01-01 PROCEDURE — 87640 STAPH A DNA AMP PROBE: CPT

## 2022-01-01 PROCEDURE — 97167 OT EVAL HIGH COMPLEX 60 MIN: CPT

## 2022-01-01 PROCEDURE — 87116 MYCOBACTERIA CULTURE: CPT

## 2022-01-01 PROCEDURE — 87637 SARSCOV2&INF A&B&RSV AMP PRB: CPT

## 2022-01-01 PROCEDURE — 92523 SPEECH SOUND LANG COMPREHEN: CPT

## 2022-01-01 PROCEDURE — 36620 INSERTION CATHETER ARTERY: CPT

## 2022-01-01 PROCEDURE — 84133 ASSAY OF URINE POTASSIUM: CPT

## 2022-01-01 PROCEDURE — 99284 EMERGENCY DEPT VISIT MOD MDM: CPT | Mod: FS

## 2022-01-01 PROCEDURE — 85520 HEPARIN ASSAY: CPT

## 2022-01-01 PROCEDURE — 70553 MRI BRAIN STEM W/O & W/DYE: CPT | Mod: 26

## 2022-01-01 PROCEDURE — 76770 US EXAM ABDO BACK WALL COMP: CPT

## 2022-01-01 PROCEDURE — 81001 URINALYSIS AUTO W/SCOPE: CPT

## 2022-01-01 PROCEDURE — 0225U NFCT DS DNA&RNA 21 SARSCOV2: CPT

## 2022-01-01 PROCEDURE — 82787 IGG 1 2 3 OR 4 EACH: CPT

## 2022-01-01 PROCEDURE — 88342 IMHCHEM/IMCYTCHM 1ST ANTB: CPT

## 2022-01-01 PROCEDURE — 90999 UNLISTED DIALYSIS PROCEDURE: CPT

## 2022-01-01 PROCEDURE — 93306 TTE W/DOPPLER COMPLETE: CPT

## 2022-01-01 PROCEDURE — 83690 ASSAY OF LIPASE: CPT

## 2022-01-01 PROCEDURE — 80069 RENAL FUNCTION PANEL: CPT

## 2022-01-01 PROCEDURE — C1758: CPT

## 2022-01-01 PROCEDURE — 95714 VEEG EA 12-26 HR UNMNTR: CPT

## 2022-01-01 PROCEDURE — 93925 LOWER EXTREMITY STUDY: CPT | Mod: 26

## 2022-01-01 PROCEDURE — 84425 ASSAY OF VITAMIN B-1: CPT

## 2022-01-01 PROCEDURE — 99214 OFFICE O/P EST MOD 30 MIN: CPT | Mod: 24

## 2022-01-01 PROCEDURE — 84080 ASSAY ALKALINE PHOSPHATASES: CPT

## 2022-01-01 PROCEDURE — 94002 VENT MGMT INPAT INIT DAY: CPT

## 2022-01-01 PROCEDURE — 84436 ASSAY OF TOTAL THYROXINE: CPT

## 2022-01-01 PROCEDURE — 74018 RADEX ABDOMEN 1 VIEW: CPT

## 2022-01-01 PROCEDURE — 80186 ASSAY OF PHENYTOIN FREE: CPT

## 2022-01-01 PROCEDURE — 88304 TISSUE EXAM BY PATHOLOGIST: CPT | Mod: 26

## 2022-01-01 PROCEDURE — 94799 UNLISTED PULMONARY SVC/PX: CPT

## 2022-01-01 PROCEDURE — 80165 DIPROPYLACETIC ACID FREE: CPT

## 2022-01-01 PROCEDURE — 87798 DETECT AGENT NOS DNA AMP: CPT

## 2022-01-01 PROCEDURE — 73610 X-RAY EXAM OF ANKLE: CPT | Mod: 26,LT

## 2022-01-01 PROCEDURE — 70551 MRI BRAIN STEM W/O DYE: CPT | Mod: 26

## 2022-01-01 PROCEDURE — 87103 BLOOD FUNGUS CULTURE: CPT

## 2022-01-01 PROCEDURE — 86985 SPLIT BLOOD OR PRODUCTS: CPT

## 2022-01-01 PROCEDURE — 80164 ASSAY DIPROPYLACETIC ACD TOT: CPT

## 2022-01-01 PROCEDURE — 87449 NOS EACH ORGANISM AG IA: CPT

## 2022-01-01 PROCEDURE — 87106 FUNGI IDENTIFICATION YEAST: CPT

## 2022-01-01 PROCEDURE — 82042 OTHER SOURCE ALBUMIN QUAN EA: CPT

## 2022-01-01 PROCEDURE — 31500 INSERT EMERGENCY AIRWAY: CPT | Mod: GC

## 2022-01-01 PROCEDURE — 70450 CT HEAD/BRAIN W/O DYE: CPT

## 2022-01-01 DEVICE — GUIDEWIRE SENSOR DUAL-FLEX NITINOL STRAIGHT .035" X 150CM
Type: IMPLANTABLE DEVICE | Site: RIGHT | Status: NON-FUNCTIONAL
Removed: 2022-07-12

## 2022-01-01 DEVICE — CHEST DRAIN THORACIC ARGYLE PVC 28FR STRAIGHT: Type: IMPLANTABLE DEVICE | Status: FUNCTIONAL

## 2022-01-01 DEVICE — SURGIFOAM PAD 8CM X 12.5CM X 10MM (100): Type: IMPLANTABLE DEVICE | Status: FUNCTIONAL

## 2022-01-01 DEVICE — KIT A-LINE 1LUM 20G X 12CM SAFE KIT: Type: IMPLANTABLE DEVICE | Status: FUNCTIONAL

## 2022-01-01 DEVICE — INTRO MICROPUNC STIFF 5FRX10CM: Type: IMPLANTABLE DEVICE | Site: RIGHT | Status: FUNCTIONAL

## 2022-01-01 DEVICE — SURGICEL 2 X 14": Type: IMPLANTABLE DEVICE | Site: RIGHT | Status: FUNCTIONAL

## 2022-01-01 DEVICE — URETERAL CATH OPEN END 5FR 70CM
Type: IMPLANTABLE DEVICE | Site: RIGHT | Status: NON-FUNCTIONAL
Removed: 2022-07-12

## 2022-01-01 DEVICE — SURGICEL 2 X 14": Type: IMPLANTABLE DEVICE | Status: FUNCTIONAL

## 2022-01-01 DEVICE — CLIP APPLIER ETHICON LIGACLIP 9 3/8" MEDIUM: Type: IMPLANTABLE DEVICE | Site: RIGHT | Status: FUNCTIONAL

## 2022-01-01 DEVICE — CHEST DRAIN THORACIC ARGYLE PVC 28FR RIGHT ANGLE: Type: IMPLANTABLE DEVICE | Status: FUNCTIONAL

## 2022-01-01 DEVICE — KIT A-LINE 1LUM 20G X 12CM SAFE KIT: Type: IMPLANTABLE DEVICE | Site: RIGHT | Status: FUNCTIONAL

## 2022-01-01 DEVICE — STENT URET DBL J CLSD 6FRX12CM: Type: IMPLANTABLE DEVICE | Site: RIGHT | Status: FUNCTIONAL

## 2022-01-01 DEVICE — STAPLER COVIDIEN TRI-STAPLE 45MM TAN RELOAD: Type: IMPLANTABLE DEVICE | Site: RIGHT | Status: FUNCTIONAL

## 2022-01-01 RX ORDER — CALCIUM GLUCONATE 100 MG/ML
1 VIAL (ML) INTRAVENOUS ONCE
Refills: 0 | Status: COMPLETED | OUTPATIENT
Start: 2022-01-01 | End: 2022-01-01

## 2022-01-01 RX ORDER — CALCIUM GLUCONATE 100 MG/ML
2 VIAL (ML) INTRAVENOUS ONCE
Refills: 0 | Status: COMPLETED | OUTPATIENT
Start: 2022-01-01 | End: 2022-01-01

## 2022-01-01 RX ORDER — LEVOTHYROXINE SODIUM 125 MCG
50 TABLET ORAL DAILY
Refills: 0 | Status: DISCONTINUED | OUTPATIENT
Start: 2022-01-01 | End: 2022-01-01

## 2022-01-01 RX ORDER — INSULIN LISPRO 100/ML
VIAL (ML) SUBCUTANEOUS
Refills: 0 | Status: DISCONTINUED | OUTPATIENT
Start: 2022-01-01 | End: 2022-01-01

## 2022-01-01 RX ORDER — INSULIN LISPRO 100/ML
7 VIAL (ML) SUBCUTANEOUS
Refills: 0 | Status: DISCONTINUED | OUTPATIENT
Start: 2022-01-01 | End: 2022-01-01

## 2022-01-01 RX ORDER — INSULIN LISPRO 100/ML
5 VIAL (ML) SUBCUTANEOUS
Refills: 0 | Status: DISCONTINUED | OUTPATIENT
Start: 2022-01-01 | End: 2022-01-01

## 2022-01-01 RX ORDER — WARFARIN SODIUM 2.5 MG/1
5 TABLET ORAL AT BEDTIME
Refills: 0 | Status: DISCONTINUED | OUTPATIENT
Start: 2022-01-01 | End: 2022-01-01

## 2022-01-01 RX ORDER — MEROPENEM 1 G/30ML
1000 INJECTION INTRAVENOUS ONCE
Refills: 0 | Status: COMPLETED | OUTPATIENT
Start: 2022-01-01 | End: 2022-01-01

## 2022-01-01 RX ORDER — APIXABAN 2.5 MG/1
2.5 TABLET, FILM COATED ORAL
Qty: 180 | Refills: 1 | Status: COMPLETED | COMMUNITY
Start: 2020-05-14 | End: 2022-01-01

## 2022-01-01 RX ORDER — INSULIN LISPRO 100/ML
4 VIAL (ML) SUBCUTANEOUS
Refills: 0 | Status: DISCONTINUED | OUTPATIENT
Start: 2022-01-01 | End: 2022-01-01

## 2022-01-01 RX ORDER — WARFARIN SODIUM 2.5 MG/1
5 TABLET ORAL ONCE
Refills: 0 | Status: COMPLETED | OUTPATIENT
Start: 2022-01-01 | End: 2022-01-01

## 2022-01-01 RX ORDER — AMLODIPINE BESYLATE 2.5 MG/1
10 TABLET ORAL
Refills: 0 | Status: DISCONTINUED | OUTPATIENT
Start: 2022-01-01 | End: 2022-01-01

## 2022-01-01 RX ORDER — WARFARIN SODIUM 2.5 MG/1
3 TABLET ORAL ONCE
Refills: 0 | Status: COMPLETED | OUTPATIENT
Start: 2022-01-01 | End: 2022-01-01

## 2022-01-01 RX ORDER — ATORVASTATIN CALCIUM 80 MG/1
40 TABLET, FILM COATED ORAL AT BEDTIME
Refills: 0 | Status: DISCONTINUED | OUTPATIENT
Start: 2022-01-01 | End: 2022-01-01

## 2022-01-01 RX ORDER — DIPHENHYDRAMINE HCL 50 MG
25 CAPSULE ORAL ONCE
Refills: 0 | Status: COMPLETED | OUTPATIENT
Start: 2022-01-01 | End: 2022-01-01

## 2022-01-01 RX ORDER — SODIUM CHLORIDE 9 MG/ML
1000 INJECTION, SOLUTION INTRAVENOUS
Refills: 0 | Status: DISCONTINUED | OUTPATIENT
Start: 2022-01-01 | End: 2022-01-01

## 2022-01-01 RX ORDER — DEXTROSE 50 % IN WATER 50 %
25 SYRINGE (ML) INTRAVENOUS ONCE
Refills: 0 | Status: DISCONTINUED | OUTPATIENT
Start: 2022-01-01 | End: 2022-01-01

## 2022-01-01 RX ORDER — HYDRALAZINE HCL 50 MG
10 TABLET ORAL ONCE
Refills: 0 | Status: COMPLETED | OUTPATIENT
Start: 2022-01-01 | End: 2022-01-01

## 2022-01-01 RX ORDER — BRIVARACETAM 25 MG/1
50 TABLET, FILM COATED ORAL
Refills: 0 | Status: DISCONTINUED | OUTPATIENT
Start: 2022-01-01 | End: 2022-01-01

## 2022-01-01 RX ORDER — FLUCONAZOLE 150 MG/1
400 TABLET ORAL EVERY 24 HOURS
Refills: 0 | Status: DISCONTINUED | OUTPATIENT
Start: 2022-01-01 | End: 2022-01-01

## 2022-01-01 RX ORDER — HEPARIN SODIUM 5000 [USP'U]/ML
INJECTION INTRAVENOUS; SUBCUTANEOUS
Qty: 25000 | Refills: 0 | Status: DISCONTINUED | OUTPATIENT
Start: 2022-01-01 | End: 2022-01-01

## 2022-01-01 RX ORDER — BLOOD SUGAR DIAGNOSTIC
STRIP MISCELLANEOUS
Qty: 2 | Refills: 5 | Status: ACTIVE | COMMUNITY
Start: 2022-01-01 | End: 1900-01-01

## 2022-01-01 RX ORDER — SODIUM BICARBONATE 1 MEQ/ML
50 SYRINGE (ML) INTRAVENOUS ONCE
Refills: 0 | Status: COMPLETED | OUTPATIENT
Start: 2022-01-01 | End: 2022-01-01

## 2022-01-01 RX ORDER — ATORVASTATIN CALCIUM 80 MG/1
80 TABLET, FILM COATED ORAL AT BEDTIME
Refills: 0 | Status: DISCONTINUED | OUTPATIENT
Start: 2022-01-01 | End: 2022-01-01

## 2022-01-01 RX ORDER — MAGNESIUM SULFATE 500 MG/ML
2 VIAL (ML) INJECTION ONCE
Refills: 0 | Status: COMPLETED | OUTPATIENT
Start: 2022-01-01 | End: 2022-01-01

## 2022-01-01 RX ORDER — PANTOPRAZOLE 40 MG/1
40 TABLET, DELAYED RELEASE ORAL DAILY
Qty: 30 | Refills: 6 | Status: DISCONTINUED | COMMUNITY
Start: 2022-01-01 | End: 2022-01-01

## 2022-01-01 RX ORDER — MORPHINE SULFATE 50 MG/1
1 CAPSULE, EXTENDED RELEASE ORAL ONCE
Refills: 0 | Status: DISCONTINUED | OUTPATIENT
Start: 2022-01-01 | End: 2022-01-01

## 2022-01-01 RX ORDER — HYDRALAZINE HCL 50 MG
5 TABLET ORAL ONCE
Refills: 0 | Status: COMPLETED | OUTPATIENT
Start: 2022-01-01 | End: 2022-01-01

## 2022-01-01 RX ORDER — MAGNESIUM OXIDE 400 MG ORAL TABLET 241.3 MG
400 TABLET ORAL EVERY 8 HOURS
Refills: 0 | Status: DISCONTINUED | OUTPATIENT
Start: 2022-01-01 | End: 2022-01-01

## 2022-01-01 RX ORDER — INSULIN HUMAN 100 [IU]/ML
5 INJECTION, SOLUTION SUBCUTANEOUS ONCE
Refills: 0 | Status: COMPLETED | OUTPATIENT
Start: 2022-01-01 | End: 2022-01-01

## 2022-01-01 RX ORDER — LACTULOSE 10 G/15ML
10 SOLUTION ORAL
Refills: 0 | Status: DISCONTINUED | OUTPATIENT
Start: 2022-01-01 | End: 2022-01-01

## 2022-01-01 RX ORDER — NOREPINEPHRINE BITARTRATE/D5W 8 MG/250ML
0.05 PLASTIC BAG, INJECTION (ML) INTRAVENOUS
Qty: 8 | Refills: 0 | Status: DISCONTINUED | OUTPATIENT
Start: 2022-01-01 | End: 2022-01-01

## 2022-01-01 RX ORDER — SODIUM ZIRCONIUM CYCLOSILICATE 10 G/10G
5 POWDER, FOR SUSPENSION ORAL ONCE
Refills: 0 | Status: COMPLETED | OUTPATIENT
Start: 2022-01-01 | End: 2022-01-01

## 2022-01-01 RX ORDER — ATORVASTATIN CALCIUM 80 MG/1
1 TABLET, FILM COATED ORAL
Qty: 0 | Refills: 0 | DISCHARGE
Start: 2022-01-01

## 2022-01-01 RX ORDER — HYDRALAZINE HCL 50 MG
100 TABLET ORAL THREE TIMES A DAY
Refills: 0 | Status: DISCONTINUED | OUTPATIENT
Start: 2022-01-01 | End: 2022-01-01

## 2022-01-01 RX ORDER — ENOXAPARIN SODIUM 100 MG/ML
60 INJECTION SUBCUTANEOUS EVERY 12 HOURS
Refills: 0 | Status: DISCONTINUED | OUTPATIENT
Start: 2022-01-01 | End: 2022-01-01

## 2022-01-01 RX ORDER — ERTAPENEM SODIUM 1 G/1
INJECTION, POWDER, LYOPHILIZED, FOR SOLUTION INTRAMUSCULAR; INTRAVENOUS
Refills: 0 | Status: DISCONTINUED | OUTPATIENT
Start: 2022-01-01 | End: 2022-01-01

## 2022-01-01 RX ORDER — HYDROMORPHONE HYDROCHLORIDE 2 MG/ML
0.2 INJECTION INTRAMUSCULAR; INTRAVENOUS; SUBCUTANEOUS
Refills: 0 | Status: DISCONTINUED | OUTPATIENT
Start: 2022-01-01 | End: 2022-01-01

## 2022-01-01 RX ORDER — SENNA PLUS 8.6 MG/1
10 TABLET ORAL ONCE
Refills: 0 | Status: COMPLETED | OUTPATIENT
Start: 2022-01-01 | End: 2022-01-01

## 2022-01-01 RX ORDER — FINASTERIDE 5 MG/1
5 TABLET, FILM COATED ORAL DAILY
Refills: 0 | Status: DISCONTINUED | OUTPATIENT
Start: 2022-01-01 | End: 2022-01-01

## 2022-01-01 RX ORDER — PANTOPRAZOLE SODIUM 20 MG/1
1 TABLET, DELAYED RELEASE ORAL
Qty: 0 | Refills: 0 | DISCHARGE
Start: 2022-01-01

## 2022-01-01 RX ORDER — MYCOPHENOLATE MOFETIL 250 MG/1
500 CAPSULE ORAL
Refills: 0 | Status: DISCONTINUED | OUTPATIENT
Start: 2022-01-01 | End: 2022-01-01

## 2022-01-01 RX ORDER — FLUCONAZOLE 150 MG/1
200 TABLET ORAL EVERY 24 HOURS
Refills: 0 | Status: DISCONTINUED | OUTPATIENT
Start: 2022-01-01 | End: 2022-01-01

## 2022-01-01 RX ORDER — DOXAZOSIN MESYLATE 4 MG
4 TABLET ORAL
Refills: 0 | Status: DISCONTINUED | OUTPATIENT
Start: 2022-01-01 | End: 2022-01-01

## 2022-01-01 RX ORDER — LANOLIN ALCOHOL/MO/W.PET/CERES
6 CREAM (GRAM) TOPICAL AT BEDTIME
Refills: 0 | Status: DISCONTINUED | OUTPATIENT
Start: 2022-01-01 | End: 2022-01-01

## 2022-01-01 RX ORDER — MAGNESIUM OXIDE 400 MG ORAL TABLET 241.3 MG
400 TABLET ORAL
Refills: 0 | Status: COMPLETED | OUTPATIENT
Start: 2022-01-01 | End: 2022-01-01

## 2022-01-01 RX ORDER — BRIVARACETAM 25 MG/1
50 TABLET, FILM COATED ORAL ONCE
Refills: 0 | Status: DISCONTINUED | OUTPATIENT
Start: 2022-01-01 | End: 2022-01-01

## 2022-01-01 RX ORDER — CHLORHEXIDINE GLUCONATE 213 G/1000ML
1 SOLUTION TOPICAL
Refills: 0 | Status: DISCONTINUED | OUTPATIENT
Start: 2022-01-01 | End: 2022-01-01

## 2022-01-01 RX ORDER — MAGNESIUM SULFATE 500 MG/ML
1 VIAL (ML) INJECTION ONCE
Refills: 0 | Status: COMPLETED | OUTPATIENT
Start: 2022-01-01 | End: 2022-01-01

## 2022-01-01 RX ORDER — FOSPHENYTOIN 50 MG/ML
100 INJECTION INTRAMUSCULAR; INTRAVENOUS
Refills: 0 | Status: DISCONTINUED | OUTPATIENT
Start: 2022-01-01 | End: 2022-01-01

## 2022-01-01 RX ORDER — SIROLIMUS 1 MG/ML
1 SOLUTION ORAL ONCE
Refills: 0 | Status: COMPLETED | OUTPATIENT
Start: 2022-01-01 | End: 2022-01-01

## 2022-01-01 RX ORDER — SODIUM ZIRCONIUM CYCLOSILICATE 10 G/10G
10 POWDER, FOR SUSPENSION ORAL DAILY
Refills: 0 | Status: DISCONTINUED | OUTPATIENT
Start: 2022-01-01 | End: 2022-01-01

## 2022-01-01 RX ORDER — SODIUM CHLORIDE 9 MG/ML
10 INJECTION INTRAMUSCULAR; INTRAVENOUS; SUBCUTANEOUS
Refills: 0 | Status: DISCONTINUED | OUTPATIENT
Start: 2022-01-01 | End: 2022-01-01

## 2022-01-01 RX ORDER — ELECTROLYTES/DEXTROSE
SOLUTION, ORAL ORAL
Qty: 30 | Refills: 5 | Status: ACTIVE | COMMUNITY
Start: 2022-01-01 | End: 1900-01-01

## 2022-01-01 RX ORDER — NYSTATIN 500MM UNIT
500000 POWDER (EA) MISCELLANEOUS
Refills: 0 | Status: DISCONTINUED | OUTPATIENT
Start: 2022-01-01 | End: 2022-01-01

## 2022-01-01 RX ORDER — PREDNISONE 10 MG/1
10 TABLET ORAL
Qty: 30 | Refills: 11 | Status: ACTIVE | COMMUNITY
Start: 2022-01-01 | End: 1900-01-01

## 2022-01-01 RX ORDER — ERYTHROPOIETIN 10000 [IU]/ML
10000 INJECTION, SOLUTION INTRAVENOUS; SUBCUTANEOUS
Refills: 0 | Status: DISCONTINUED | OUTPATIENT
Start: 2022-01-01 | End: 2022-01-01

## 2022-01-01 RX ORDER — SIROLIMUS 1 MG/ML
0.5 SOLUTION ORAL ONCE
Refills: 0 | Status: COMPLETED | OUTPATIENT
Start: 2022-01-01 | End: 2022-01-01

## 2022-01-01 RX ORDER — GLUCAGON INJECTION, SOLUTION 0.5 MG/.1ML
1 INJECTION, SOLUTION SUBCUTANEOUS ONCE
Refills: 0 | Status: DISCONTINUED | OUTPATIENT
Start: 2022-01-01 | End: 2022-01-01

## 2022-01-01 RX ORDER — INSULIN HUMAN 100 [IU]/ML
3 INJECTION, SOLUTION SUBCUTANEOUS
Qty: 100 | Refills: 0 | Status: DISCONTINUED | OUTPATIENT
Start: 2022-01-01 | End: 2022-01-01

## 2022-01-01 RX ORDER — MIDAZOLAM HYDROCHLORIDE 1 MG/ML
4 INJECTION, SOLUTION INTRAMUSCULAR; INTRAVENOUS ONCE
Refills: 0 | Status: DISCONTINUED | OUTPATIENT
Start: 2022-01-01 | End: 2022-01-01

## 2022-01-01 RX ORDER — INSULIN GLARGINE 100 [IU]/ML
3 INJECTION, SOLUTION SUBCUTANEOUS
Qty: 0 | Refills: 0 | DISCHARGE
Start: 2022-01-01

## 2022-01-01 RX ORDER — WARFARIN SODIUM 2.5 MG/1
6 TABLET ORAL ONCE
Refills: 0 | Status: COMPLETED | OUTPATIENT
Start: 2022-01-01 | End: 2022-01-01

## 2022-01-01 RX ORDER — DEXTROSE 50 % IN WATER 50 %
50 SYRINGE (ML) INTRAVENOUS ONCE
Refills: 0 | Status: COMPLETED | OUTPATIENT
Start: 2022-01-01 | End: 2022-01-01

## 2022-01-01 RX ORDER — PANTOPRAZOLE 40 MG/1
40 TABLET, DELAYED RELEASE ORAL
Qty: 60 | Refills: 0 | Status: DISCONTINUED | COMMUNITY
Start: 2021-10-18 | End: 2022-01-01

## 2022-01-01 RX ORDER — FILGRASTIM 480MCG/1.6
300 VIAL (ML) INJECTION ONCE
Refills: 0 | Status: COMPLETED | OUTPATIENT
Start: 2022-01-01 | End: 2022-01-01

## 2022-01-01 RX ORDER — CALCIUM ACETATE 667 MG
667 TABLET ORAL
Refills: 0 | Status: DISCONTINUED | OUTPATIENT
Start: 2022-01-01 | End: 2022-01-01

## 2022-01-01 RX ORDER — HYDRALAZINE HCL 50 MG
0 TABLET ORAL
Qty: 0 | Refills: 0 | DISCHARGE

## 2022-01-01 RX ORDER — TACROLIMUS 5 MG/1
3 CAPSULE ORAL DAILY
Refills: 0 | Status: DISCONTINUED | OUTPATIENT
Start: 2022-01-01 | End: 2022-01-01

## 2022-01-01 RX ORDER — BRIVARACETAM 25 MG/1
1 TABLET, FILM COATED ORAL
Qty: 13 | Refills: 0
Start: 2022-01-01 | End: 2022-01-01

## 2022-01-01 RX ORDER — WARFARIN SODIUM 2.5 MG/1
7 TABLET ORAL AT BEDTIME
Refills: 0 | Status: COMPLETED | OUTPATIENT
Start: 2022-01-01 | End: 2022-01-01

## 2022-01-01 RX ORDER — DEXTROSE 50 % IN WATER 50 %
50 SYRINGE (ML) INTRAVENOUS
Refills: 0 | Status: DISCONTINUED | OUTPATIENT
Start: 2022-01-01 | End: 2022-01-01

## 2022-01-01 RX ORDER — HEPARIN SODIUM 5000 [USP'U]/ML
1050 INJECTION INTRAVENOUS; SUBCUTANEOUS
Qty: 25000 | Refills: 0 | Status: DISCONTINUED | OUTPATIENT
Start: 2022-01-01 | End: 2022-01-01

## 2022-01-01 RX ORDER — SEVELAMER CARBONATE 2400 MG/1
1 POWDER, FOR SUSPENSION ORAL
Qty: 0 | Refills: 0 | DISCHARGE

## 2022-01-01 RX ORDER — DOXAZOSIN 4 MG/1
4 TABLET ORAL TWICE DAILY
Qty: 60 | Refills: 4 | Status: ACTIVE | COMMUNITY
Start: 2022-01-01 | End: 1900-01-01

## 2022-01-01 RX ORDER — ATORVASTATIN CALCIUM 80 MG/1
40 TABLET, FILM COATED ORAL ONCE
Refills: 0 | Status: COMPLETED | OUTPATIENT
Start: 2022-01-01 | End: 2022-01-01

## 2022-01-01 RX ORDER — VALPROIC ACID (AS SODIUM SALT) 250 MG/5ML
500 SOLUTION, ORAL ORAL EVERY 8 HOURS
Refills: 0 | Status: DISCONTINUED | OUTPATIENT
Start: 2022-01-01 | End: 2022-01-01

## 2022-01-01 RX ORDER — NYSTATIN 500MM UNIT
5 POWDER (EA) MISCELLANEOUS
Refills: 0 | Status: DISCONTINUED | OUTPATIENT
Start: 2022-01-01 | End: 2022-01-01

## 2022-01-01 RX ORDER — FUROSEMIDE 40 MG
40 TABLET ORAL ONCE
Refills: 0 | Status: COMPLETED | OUTPATIENT
Start: 2022-01-01 | End: 2022-01-01

## 2022-01-01 RX ORDER — ERTAPENEM SODIUM 1 G/1
1000 INJECTION, POWDER, LYOPHILIZED, FOR SOLUTION INTRAMUSCULAR; INTRAVENOUS EVERY 24 HOURS
Refills: 0 | Status: DISCONTINUED | OUTPATIENT
Start: 2022-01-01 | End: 2022-01-01

## 2022-01-01 RX ORDER — HYDRALAZINE HCL 50 MG
10 TABLET ORAL THREE TIMES A DAY
Refills: 0 | Status: DISCONTINUED | OUTPATIENT
Start: 2022-01-01 | End: 2022-01-01

## 2022-01-01 RX ORDER — MYCOPHENOLATE MOFETIL 250 MG/1
250 CAPSULE ORAL
Refills: 0 | Status: DISCONTINUED | OUTPATIENT
Start: 2022-01-01 | End: 2022-01-01

## 2022-01-01 RX ORDER — LANOLIN ALCOHOL/MO/W.PET/CERES
3 CREAM (GRAM) TOPICAL AT BEDTIME
Refills: 0 | Status: COMPLETED | OUTPATIENT
Start: 2022-01-01 | End: 2022-01-01

## 2022-01-01 RX ORDER — WARFARIN SODIUM 2.5 MG/1
7 TABLET ORAL ONCE
Refills: 0 | Status: DISCONTINUED | OUTPATIENT
Start: 2022-01-01 | End: 2022-01-01

## 2022-01-01 RX ORDER — ENOXAPARIN SODIUM 100 MG/ML
60 INJECTION SUBCUTANEOUS ONCE
Refills: 0 | Status: COMPLETED | OUTPATIENT
Start: 2022-01-01 | End: 2022-01-01

## 2022-01-01 RX ORDER — DEXTROSE 50 % IN WATER 50 %
12.5 SYRINGE (ML) INTRAVENOUS ONCE
Refills: 0 | Status: DISCONTINUED | OUTPATIENT
Start: 2022-01-01 | End: 2022-01-01

## 2022-01-01 RX ORDER — NIFEDIPINE 30 MG
60 TABLET, EXTENDED RELEASE 24 HR ORAL DAILY
Refills: 0 | Status: DISCONTINUED | OUTPATIENT
Start: 2022-01-01 | End: 2022-01-01

## 2022-01-01 RX ORDER — MYCOPHENOLATE MOFETIL 250 MG/1
1000 CAPSULE ORAL EVERY 12 HOURS
Refills: 0 | Status: DISCONTINUED | OUTPATIENT
Start: 2022-01-01 | End: 2022-01-01

## 2022-01-01 RX ORDER — SODIUM CHLORIDE 9 MG/ML
1000 INJECTION INTRAMUSCULAR; INTRAVENOUS; SUBCUTANEOUS
Refills: 0 | Status: DISCONTINUED | OUTPATIENT
Start: 2022-01-01 | End: 2022-01-01

## 2022-01-01 RX ORDER — CARVEDILOL PHOSPHATE 80 MG/1
3.12 CAPSULE, EXTENDED RELEASE ORAL EVERY 12 HOURS
Refills: 0 | Status: DISCONTINUED | OUTPATIENT
Start: 2022-01-01 | End: 2022-01-01

## 2022-01-01 RX ORDER — PANTOPRAZOLE SODIUM 20 MG/1
40 TABLET, DELAYED RELEASE ORAL DAILY
Refills: 0 | Status: DISCONTINUED | OUTPATIENT
Start: 2022-01-01 | End: 2022-01-01

## 2022-01-01 RX ORDER — FOSPHENYTOIN 50 MG/ML
1000 INJECTION INTRAMUSCULAR; INTRAVENOUS ONCE
Refills: 0 | Status: COMPLETED | OUTPATIENT
Start: 2022-01-01 | End: 2022-01-01

## 2022-01-01 RX ORDER — GANCICLOVIR SODIUM 50 MG/ML
155 INJECTION, POWDER, LYOPHILIZED, FOR SOLUTION INTRAVENTRICULAR ONCE
Refills: 0 | Status: COMPLETED | OUTPATIENT
Start: 2022-01-01 | End: 2022-01-01

## 2022-01-01 RX ORDER — SIROLIMUS 1 MG/ML
6 SOLUTION ORAL
Refills: 0 | Status: DISCONTINUED | OUTPATIENT
Start: 2022-01-01 | End: 2022-01-01

## 2022-01-01 RX ORDER — CARVEDILOL PHOSPHATE 80 MG/1
25 CAPSULE, EXTENDED RELEASE ORAL EVERY 12 HOURS
Refills: 0 | Status: DISCONTINUED | OUTPATIENT
Start: 2022-01-01 | End: 2022-01-01

## 2022-01-01 RX ORDER — ERYTHROPOIETIN 10000 [IU]/ML
4000 INJECTION, SOLUTION INTRAVENOUS; SUBCUTANEOUS ONCE
Refills: 0 | Status: COMPLETED | OUTPATIENT
Start: 2022-01-01 | End: 2022-01-01

## 2022-01-01 RX ORDER — INSULIN LISPRO 100/ML
9 VIAL (ML) SUBCUTANEOUS
Refills: 0 | Status: DISCONTINUED | OUTPATIENT
Start: 2022-01-01 | End: 2022-01-01

## 2022-01-01 RX ORDER — INSULIN LISPRO 100/ML
5 VIAL (ML) SUBCUTANEOUS
Qty: 5 | Refills: 0
Start: 2022-01-01 | End: 2022-01-01

## 2022-01-01 RX ORDER — UREA 10 %
50 LOTION (ML) TOPICAL DAILY
Qty: 30 | Refills: 8 | Status: DISCONTINUED | COMMUNITY
Start: 2020-10-14 | End: 2022-01-01

## 2022-01-01 RX ORDER — FUROSEMIDE 40 MG
20 TABLET ORAL ONCE
Refills: 0 | Status: COMPLETED | OUTPATIENT
Start: 2022-01-01 | End: 2022-01-01

## 2022-01-01 RX ORDER — AMLODIPINE BESYLATE 2.5 MG/1
5 TABLET ORAL DAILY
Refills: 0 | Status: DISCONTINUED | OUTPATIENT
Start: 2022-01-01 | End: 2022-01-01

## 2022-01-01 RX ORDER — HUMAN INSULIN 100 [IU]/ML
2 INJECTION, SUSPENSION SUBCUTANEOUS EVERY 6 HOURS
Refills: 0 | Status: DISCONTINUED | OUTPATIENT
Start: 2022-01-01 | End: 2022-01-01

## 2022-01-01 RX ORDER — HYDROMORPHONE HYDROCHLORIDE 2 MG/ML
0.5 INJECTION INTRAMUSCULAR; INTRAVENOUS; SUBCUTANEOUS
Refills: 0 | Status: DISCONTINUED | OUTPATIENT
Start: 2022-01-01 | End: 2022-01-01

## 2022-01-01 RX ORDER — INSULIN LISPRO 100/ML
8 VIAL (ML) SUBCUTANEOUS
Refills: 0 | Status: DISCONTINUED | OUTPATIENT
Start: 2022-01-01 | End: 2022-01-01

## 2022-01-01 RX ORDER — FUROSEMIDE 40 MG
80 TABLET ORAL
Refills: 0 | Status: DISCONTINUED | OUTPATIENT
Start: 2022-01-01 | End: 2022-01-01

## 2022-01-01 RX ORDER — SUCRALFATE 1 G
1 TABLET ORAL EVERY 12 HOURS
Refills: 0 | Status: DISCONTINUED | OUTPATIENT
Start: 2022-01-01 | End: 2022-01-01

## 2022-01-01 RX ORDER — TACROLIMUS 4 MG/1
4 TABLET, EXTENDED RELEASE ORAL DAILY
Qty: 30 | Refills: 5 | Status: DISCONTINUED | COMMUNITY
Start: 2022-01-01 | End: 2022-01-01

## 2022-01-01 RX ORDER — CARVEDILOL PHOSPHATE 80 MG/1
12.5 CAPSULE, EXTENDED RELEASE ORAL EVERY 12 HOURS
Refills: 0 | Status: DISCONTINUED | OUTPATIENT
Start: 2022-01-01 | End: 2022-01-01

## 2022-01-01 RX ORDER — CARVEDILOL PHOSPHATE 80 MG/1
6.25 CAPSULE, EXTENDED RELEASE ORAL EVERY 12 HOURS
Refills: 0 | Status: DISCONTINUED | OUTPATIENT
Start: 2022-01-01 | End: 2022-01-01

## 2022-01-01 RX ORDER — ACETAMINOPHEN 500 MG
650 TABLET ORAL EVERY 6 HOURS
Refills: 0 | Status: DISCONTINUED | OUTPATIENT
Start: 2022-01-01 | End: 2022-01-01

## 2022-01-01 RX ORDER — LEVETIRACETAM 250 MG/1
1 TABLET, FILM COATED ORAL
Qty: 0 | Refills: 0 | DISCHARGE

## 2022-01-01 RX ORDER — MYCOPHENOLATE MOFETIL 250 MG/1
1000 CAPSULE ORAL
Refills: 0 | Status: DISCONTINUED | OUTPATIENT
Start: 2022-01-01 | End: 2022-01-01

## 2022-01-01 RX ORDER — PERAMPANEL 2 MG/1
4 TABLET ORAL AT BEDTIME
Refills: 0 | Status: DISCONTINUED | OUTPATIENT
Start: 2022-01-01 | End: 2022-01-01

## 2022-01-01 RX ORDER — MYCOPHENOLATE MOFETIL 250 MG/1
1 CAPSULE ORAL
Qty: 0 | Refills: 0 | DISCHARGE

## 2022-01-01 RX ORDER — LEVOTHYROXINE SODIUM 0.05 MG/1
50 TABLET ORAL DAILY
Qty: 30 | Refills: 1 | Status: DISCONTINUED | COMMUNITY
Start: 2022-01-01 | End: 2022-01-01

## 2022-01-01 RX ORDER — LABETALOL HCL 100 MG
10 TABLET ORAL ONCE
Refills: 0 | Status: COMPLETED | OUTPATIENT
Start: 2022-01-01 | End: 2022-01-01

## 2022-01-01 RX ORDER — PROPOFOL 10 MG/ML
20 INJECTION, EMULSION INTRAVENOUS
Qty: 1000 | Refills: 0 | Status: DISCONTINUED | OUTPATIENT
Start: 2022-01-01 | End: 2022-01-01

## 2022-01-01 RX ORDER — CARVEDILOL PHOSPHATE 80 MG/1
6.25 CAPSULE, EXTENDED RELEASE ORAL ONCE
Refills: 0 | Status: COMPLETED | OUTPATIENT
Start: 2022-01-01 | End: 2022-01-01

## 2022-01-01 RX ORDER — BRIVARACETAM 25 MG/1
50 TABLET, FILM COATED ORAL EVERY 12 HOURS
Refills: 0 | Status: DISCONTINUED | OUTPATIENT
Start: 2022-01-01 | End: 2022-01-01

## 2022-01-01 RX ORDER — ERTAPENEM SODIUM 1 G/1
1000 INJECTION, POWDER, LYOPHILIZED, FOR SOLUTION INTRAMUSCULAR; INTRAVENOUS ONCE
Refills: 0 | Status: COMPLETED | OUTPATIENT
Start: 2022-01-01 | End: 2022-01-01

## 2022-01-01 RX ORDER — VALGANCICLOVIR 450 MG/1
450 TABLET, FILM COATED ORAL DAILY
Refills: 0 | Status: DISCONTINUED | OUTPATIENT
Start: 2022-01-01 | End: 2022-01-01

## 2022-01-01 RX ORDER — POLYETHYLENE GLYCOL 3350 17 G/17G
17 POWDER, FOR SOLUTION ORAL ONCE
Refills: 0 | Status: COMPLETED | OUTPATIENT
Start: 2022-01-01 | End: 2022-01-01

## 2022-01-01 RX ORDER — WARFARIN SODIUM 2.5 MG/1
10 TABLET ORAL AT BEDTIME
Refills: 0 | Status: COMPLETED | OUTPATIENT
Start: 2022-01-01 | End: 2022-01-01

## 2022-01-01 RX ORDER — LEVOTHYROXINE SODIUM 125 MCG
40 TABLET ORAL AT BEDTIME
Refills: 0 | Status: DISCONTINUED | OUTPATIENT
Start: 2022-01-01 | End: 2022-01-01

## 2022-01-01 RX ORDER — DOXAZOSIN MESYLATE 4 MG
2 TABLET ORAL DAILY
Refills: 0 | Status: DISCONTINUED | OUTPATIENT
Start: 2022-01-01 | End: 2022-01-01

## 2022-01-01 RX ORDER — ONDANSETRON 8 MG/1
4 TABLET, FILM COATED ORAL ONCE
Refills: 0 | Status: COMPLETED | OUTPATIENT
Start: 2022-01-01 | End: 2022-01-01

## 2022-01-01 RX ORDER — AMLODIPINE BESYLATE 2.5 MG/1
10 TABLET ORAL DAILY
Refills: 0 | Status: DISCONTINUED | OUTPATIENT
Start: 2022-01-01 | End: 2022-01-01

## 2022-01-01 RX ORDER — POLYETHYLENE GLYCOL 3350 17 G/17G
17 POWDER, FOR SOLUTION ORAL DAILY
Refills: 0 | Status: DISCONTINUED | OUTPATIENT
Start: 2022-01-01 | End: 2022-01-01

## 2022-01-01 RX ORDER — METOCLOPRAMIDE HCL 10 MG
5 TABLET ORAL ONCE
Refills: 0 | Status: COMPLETED | OUTPATIENT
Start: 2022-01-01 | End: 2022-01-01

## 2022-01-01 RX ORDER — PANTOPRAZOLE SODIUM 20 MG/1
40 TABLET, DELAYED RELEASE ORAL
Refills: 0 | Status: DISCONTINUED | OUTPATIENT
Start: 2022-01-01 | End: 2022-01-01

## 2022-01-01 RX ORDER — LANOLIN ALCOHOL/MO/W.PET/CERES
5 CREAM (GRAM) TOPICAL AT BEDTIME
Refills: 0 | Status: DISCONTINUED | OUTPATIENT
Start: 2022-01-01 | End: 2022-01-01

## 2022-01-01 RX ORDER — HYDRALAZINE HCL 50 MG
100 TABLET ORAL EVERY 8 HOURS
Refills: 0 | Status: DISCONTINUED | OUTPATIENT
Start: 2022-01-01 | End: 2022-01-01

## 2022-01-01 RX ORDER — BELATACEPT 250 MG/1
625 INJECTION, POWDER, LYOPHILIZED, FOR SOLUTION INTRAVENOUS ONCE
Refills: 0 | Status: COMPLETED | OUTPATIENT
Start: 2022-01-01 | End: 2022-01-01

## 2022-01-01 RX ORDER — SENNA PLUS 8.6 MG/1
2 TABLET ORAL AT BEDTIME
Refills: 0 | Status: DISCONTINUED | OUTPATIENT
Start: 2022-01-01 | End: 2022-01-01

## 2022-01-01 RX ORDER — FUROSEMIDE 40 MG
80 TABLET ORAL DAILY
Refills: 0 | Status: DISCONTINUED | OUTPATIENT
Start: 2022-01-01 | End: 2022-01-01

## 2022-01-01 RX ORDER — INSULIN LISPRO 100/ML
VIAL (ML) SUBCUTANEOUS AT BEDTIME
Refills: 0 | Status: DISCONTINUED | OUTPATIENT
Start: 2022-01-01 | End: 2022-01-01

## 2022-01-01 RX ORDER — INSULIN LISPRO 100/ML
2 VIAL (ML) SUBCUTANEOUS ONCE
Refills: 0 | Status: COMPLETED | OUTPATIENT
Start: 2022-01-01 | End: 2022-01-01

## 2022-01-01 RX ORDER — CEPHALEXIN 500 MG
500 CAPSULE ORAL EVERY 12 HOURS
Refills: 0 | Status: DISCONTINUED | OUTPATIENT
Start: 2022-01-01 | End: 2022-01-01

## 2022-01-01 RX ORDER — VALGANCICLOVIR 450 MG/1
450 TABLET, FILM COATED ORAL
Refills: 0 | Status: DISCONTINUED | OUTPATIENT
Start: 2022-01-01 | End: 2022-01-01

## 2022-01-01 RX ORDER — ONDANSETRON 8 MG/1
4 TABLET, FILM COATED ORAL ONCE
Refills: 0 | Status: DISCONTINUED | OUTPATIENT
Start: 2022-01-01 | End: 2022-01-01

## 2022-01-01 RX ORDER — SODIUM CHLORIDE 9 MG/ML
250 INJECTION INTRAMUSCULAR; INTRAVENOUS; SUBCUTANEOUS ONCE
Refills: 0 | Status: COMPLETED | OUTPATIENT
Start: 2022-01-01 | End: 2022-01-01

## 2022-01-01 RX ORDER — LABETALOL HCL 100 MG
10 TABLET ORAL ONCE
Refills: 0 | Status: DISCONTINUED | OUTPATIENT
Start: 2022-01-01 | End: 2022-01-01

## 2022-01-01 RX ORDER — DEXTROSE 50 % IN WATER 50 %
15 SYRINGE (ML) INTRAVENOUS ONCE
Refills: 0 | Status: DISCONTINUED | OUTPATIENT
Start: 2022-01-01 | End: 2022-01-01

## 2022-01-01 RX ORDER — INSULIN GLARGINE 100 [IU]/ML
100 INJECTION, SOLUTION SUBCUTANEOUS AT BEDTIME
Qty: 5 | Refills: 5 | Status: ACTIVE | COMMUNITY
Start: 2022-01-01 | End: 1900-01-01

## 2022-01-01 RX ORDER — CLOPIDOGREL BISULFATE 75 MG/1
1 TABLET, FILM COATED ORAL
Qty: 30 | Refills: 0
Start: 2022-01-01 | End: 2022-01-01

## 2022-01-01 RX ORDER — INSULIN LISPRO 100/ML
6 VIAL (ML) SUBCUTANEOUS
Refills: 0 | Status: DISCONTINUED | OUTPATIENT
Start: 2022-01-01 | End: 2022-01-01

## 2022-01-01 RX ORDER — VANCOMYCIN HCL 1 G
1000 VIAL (EA) INTRAVENOUS ONCE
Refills: 0 | Status: COMPLETED | OUTPATIENT
Start: 2022-01-01 | End: 2022-01-01

## 2022-01-01 RX ORDER — SUCRALFATE 1 G
10 TABLET ORAL
Qty: 600 | Refills: 0
Start: 2022-01-01 | End: 2022-01-01

## 2022-01-01 RX ORDER — WARFARIN SODIUM 2.5 MG/1
5 TABLET ORAL DAILY
Refills: 0 | Status: DISCONTINUED | OUTPATIENT
Start: 2022-01-01 | End: 2022-01-01

## 2022-01-01 RX ORDER — INSULIN GLARGINE 100 [IU]/ML
7 INJECTION, SOLUTION SUBCUTANEOUS AT BEDTIME
Refills: 0 | Status: DISCONTINUED | OUTPATIENT
Start: 2022-01-01 | End: 2022-01-01

## 2022-01-01 RX ORDER — SODIUM BICARBONATE 1 MEQ/ML
2 SYRINGE (ML) INTRAVENOUS
Qty: 0 | Refills: 0 | DISCHARGE
Start: 2022-01-01

## 2022-01-01 RX ORDER — BENZOCAINE 10 %
1 GEL (GRAM) MUCOUS MEMBRANE THREE TIMES A DAY
Refills: 0 | Status: DISCONTINUED | OUTPATIENT
Start: 2022-01-01 | End: 2022-01-01

## 2022-01-01 RX ORDER — HEPARIN SODIUM 5000 [USP'U]/ML
5000 INJECTION INTRAVENOUS; SUBCUTANEOUS EVERY 12 HOURS
Refills: 0 | Status: COMPLETED | OUTPATIENT
Start: 2022-01-01 | End: 2022-01-01

## 2022-01-01 RX ORDER — MEROPENEM 1 G/30ML
1000 INJECTION INTRAVENOUS EVERY 12 HOURS
Refills: 0 | Status: DISCONTINUED | OUTPATIENT
Start: 2022-01-01 | End: 2022-01-01

## 2022-01-01 RX ORDER — CHLORHEXIDINE GLUCONATE 213 G/1000ML
1 SOLUTION TOPICAL DAILY
Refills: 0 | Status: DISCONTINUED | OUTPATIENT
Start: 2022-01-01 | End: 2022-01-01

## 2022-01-01 RX ORDER — PANTOPRAZOLE 40 MG/1
40 TABLET, DELAYED RELEASE ORAL
Qty: 30 | Refills: 3 | Status: ACTIVE | COMMUNITY
Start: 2022-01-01 | End: 1900-01-01

## 2022-01-01 RX ORDER — LEVOTHYROXINE SODIUM 0.05 MG/1
50 TABLET ORAL DAILY
Qty: 1 | Refills: 1 | Status: DISCONTINUED | COMMUNITY
Start: 2022-01-01 | End: 2022-01-01

## 2022-01-01 RX ORDER — HYDRALAZINE HCL 50 MG
10 TABLET ORAL ONCE
Refills: 0 | Status: DISCONTINUED | OUTPATIENT
Start: 2022-01-01 | End: 2022-01-01

## 2022-01-01 RX ORDER — PHENYLEPHRINE HYDROCHLORIDE 10 MG/ML
0.6 INJECTION INTRAVENOUS
Qty: 40 | Refills: 0 | Status: DISCONTINUED | OUTPATIENT
Start: 2022-01-01 | End: 2022-01-01

## 2022-01-01 RX ORDER — SIROLIMUS 1 MG/ML
3.5 SOLUTION ORAL
Refills: 0 | Status: DISCONTINUED | OUTPATIENT
Start: 2022-01-01 | End: 2022-01-01

## 2022-01-01 RX ORDER — SULFAMETHOXAZOLE AND TRIMETHOPRIM 800; 160 MG/1; MG/1
800-160 TABLET ORAL
Qty: 14 | Refills: 0 | Status: DISCONTINUED | COMMUNITY
Start: 2022-01-01

## 2022-01-01 RX ORDER — FINASTERIDE 5 MG/1
1 TABLET, FILM COATED ORAL
Qty: 30 | Refills: 0
Start: 2022-01-01 | End: 2022-01-01

## 2022-01-01 RX ORDER — ACETAMINOPHEN 500 MG
650 TABLET ORAL ONCE
Refills: 0 | Status: COMPLETED | OUTPATIENT
Start: 2022-01-01 | End: 2022-01-01

## 2022-01-01 RX ORDER — MYCOPHENOLATE MOFETIL 250 MG/1
2 CAPSULE ORAL
Qty: 0 | Refills: 0 | DISCHARGE

## 2022-01-01 RX ORDER — INSULIN GLARGINE 100 [IU]/ML
5 INJECTION, SOLUTION SUBCUTANEOUS
Refills: 0 | Status: DISCONTINUED | OUTPATIENT
Start: 2022-01-01 | End: 2022-01-01

## 2022-01-01 RX ORDER — RAMELTEON 8 MG
8 TABLET ORAL AT BEDTIME
Refills: 0 | Status: DISCONTINUED | OUTPATIENT
Start: 2022-01-01 | End: 2022-01-01

## 2022-01-01 RX ORDER — ACETAMINOPHEN 500 MG
1000 TABLET ORAL ONCE
Refills: 0 | Status: COMPLETED | OUTPATIENT
Start: 2022-01-01 | End: 2022-01-01

## 2022-01-01 RX ORDER — TACROLIMUS 5 MG/1
5 CAPSULE ORAL ONCE
Refills: 0 | Status: COMPLETED | OUTPATIENT
Start: 2022-01-01 | End: 2022-01-01

## 2022-01-01 RX ORDER — LIDOCAINE HCL 20 MG/ML
20 VIAL (ML) INJECTION ONCE
Refills: 0 | Status: DISCONTINUED | OUTPATIENT
Start: 2022-01-01 | End: 2022-01-01

## 2022-01-01 RX ORDER — ETOMIDATE 2 MG/ML
20 INJECTION INTRAVENOUS ONCE
Refills: 0 | Status: COMPLETED | OUTPATIENT
Start: 2022-01-01 | End: 2022-01-01

## 2022-01-01 RX ORDER — MYCOPHENOLATE MOFETIL 250 MG/1
2 CAPSULE ORAL
Qty: 0 | Refills: 0 | DISCHARGE
Start: 2022-01-01

## 2022-01-01 RX ORDER — POLYETHYLENE GLYCOL 3350 17 G/17G
17 POWDER, FOR SOLUTION ORAL DAILY
Qty: 1 | Refills: 3 | Status: ACTIVE | COMMUNITY
Start: 2022-01-01 | End: 1900-01-01

## 2022-01-01 RX ORDER — DEXTROSE 50 % IN WATER 50 %
25 SYRINGE (ML) INTRAVENOUS ONCE
Refills: 0 | Status: COMPLETED | OUTPATIENT
Start: 2022-01-01 | End: 2022-01-01

## 2022-01-01 RX ORDER — WARFARIN SODIUM 2.5 MG/1
5 TABLET ORAL ONCE
Refills: 0 | Status: DISCONTINUED | OUTPATIENT
Start: 2022-01-01 | End: 2022-01-01

## 2022-01-01 RX ORDER — CARVEDILOL 6.25 MG/1
6.25 TABLET, FILM COATED ORAL TWICE DAILY
Qty: 180 | Refills: 1 | Status: DISCONTINUED | COMMUNITY
Start: 2020-09-03 | End: 2022-01-01

## 2022-01-01 RX ORDER — NYSTATIN 500MM UNIT
5 POWDER (EA) MISCELLANEOUS
Qty: 0 | Refills: 0 | DISCHARGE
Start: 2022-01-01

## 2022-01-01 RX ORDER — INSULIN LISPRO 100/ML
VIAL (ML) SUBCUTANEOUS EVERY 8 HOURS
Refills: 0 | Status: DISCONTINUED | OUTPATIENT
Start: 2022-01-01 | End: 2022-01-01

## 2022-01-01 RX ORDER — FENTANYL CITRATE 50 UG/ML
0.5 INJECTION INTRAVENOUS
Qty: 5000 | Refills: 0 | Status: DISCONTINUED | OUTPATIENT
Start: 2022-01-01 | End: 2022-01-01

## 2022-01-01 RX ORDER — INSULIN LISPRO 100/ML
VIAL (ML) SUBCUTANEOUS ONCE
Refills: 0 | Status: COMPLETED | OUTPATIENT
Start: 2022-01-01 | End: 2022-01-01

## 2022-01-01 RX ORDER — TACROLIMUS 5 MG/1
0.5 CAPSULE ORAL
Refills: 0 | Status: DISCONTINUED | OUTPATIENT
Start: 2022-01-01 | End: 2022-01-01

## 2022-01-01 RX ORDER — SODIUM ZIRCONIUM CYCLOSILICATE 10 G/10G
10 POWDER, FOR SUSPENSION ORAL EVERY 8 HOURS
Refills: 0 | Status: DISCONTINUED | OUTPATIENT
Start: 2022-01-01 | End: 2022-01-01

## 2022-01-01 RX ORDER — LEVOTHYROXINE SODIUM 125 MCG
1 TABLET ORAL
Qty: 0 | Refills: 0 | DISCHARGE
Start: 2022-01-01

## 2022-01-01 RX ORDER — INSULIN GLARGINE 100 [IU]/ML
5 INJECTION, SOLUTION SUBCUTANEOUS AT BEDTIME
Refills: 0 | Status: DISCONTINUED | OUTPATIENT
Start: 2022-01-01 | End: 2022-01-01

## 2022-01-01 RX ORDER — INSULIN LISPRO 100/ML
VIAL (ML) SUBCUTANEOUS EVERY 6 HOURS
Refills: 0 | Status: DISCONTINUED | OUTPATIENT
Start: 2022-01-01 | End: 2022-01-01

## 2022-01-01 RX ORDER — SUCRALFATE 1 G
1 TABLET ORAL
Refills: 0 | Status: DISCONTINUED | OUTPATIENT
Start: 2022-01-01 | End: 2022-01-01

## 2022-01-01 RX ORDER — VALGANCICLOVIR 450 MG/1
1 TABLET, FILM COATED ORAL
Qty: 0 | Refills: 0 | DISCHARGE
Start: 2022-01-01

## 2022-01-01 RX ORDER — AMLODIPINE BESYLATE 2.5 MG/1
1 TABLET ORAL
Qty: 0 | Refills: 0 | DISCHARGE

## 2022-01-01 RX ORDER — CEFEPIME 1 G/1
1000 INJECTION, POWDER, FOR SOLUTION INTRAMUSCULAR; INTRAVENOUS EVERY 8 HOURS
Refills: 0 | Status: DISCONTINUED | OUTPATIENT
Start: 2022-01-01 | End: 2022-01-01

## 2022-01-01 RX ORDER — TACROLIMUS 5 MG/1
4 CAPSULE ORAL
Refills: 0 | Status: DISCONTINUED | OUTPATIENT
Start: 2022-01-01 | End: 2022-01-01

## 2022-01-01 RX ORDER — VALGANCICLOVIR HYDROCHLORIDE 450 MG/1
450 TABLET ORAL
Qty: 30 | Refills: 5 | Status: DISCONTINUED | COMMUNITY
Start: 2022-01-01 | End: 2022-01-01

## 2022-01-01 RX ORDER — CHLORPROMAZINE HCL 10 MG
25 TABLET ORAL ONCE
Refills: 0 | Status: COMPLETED | OUTPATIENT
Start: 2022-01-01 | End: 2022-01-01

## 2022-01-01 RX ORDER — FUROSEMIDE 40 MG
80 TABLET ORAL ONCE
Refills: 0 | Status: COMPLETED | OUTPATIENT
Start: 2022-01-01 | End: 2022-01-01

## 2022-01-01 RX ORDER — DEXTROSE 50 % IN WATER 50 %
50 SYRINGE (ML) INTRAVENOUS ONCE
Refills: 0 | Status: DISCONTINUED | OUTPATIENT
Start: 2022-01-01 | End: 2022-01-01

## 2022-01-01 RX ORDER — INSULIN LISPRO 100/ML
3 VIAL (ML) SUBCUTANEOUS
Refills: 0 | Status: DISCONTINUED | OUTPATIENT
Start: 2022-01-01 | End: 2022-01-01

## 2022-01-01 RX ORDER — FOSPHENYTOIN 50 MG/ML
100 INJECTION INTRAMUSCULAR; INTRAVENOUS EVERY 8 HOURS
Refills: 0 | Status: DISCONTINUED | OUTPATIENT
Start: 2022-01-01 | End: 2022-01-01

## 2022-01-01 RX ORDER — LEVETIRACETAM 250 MG/1
250 TABLET, FILM COATED ORAL EVERY 12 HOURS
Refills: 0 | Status: DISCONTINUED | OUTPATIENT
Start: 2022-01-01 | End: 2022-01-01

## 2022-01-01 RX ORDER — HYDROMORPHONE HYDROCHLORIDE 2 MG/ML
0.25 INJECTION INTRAMUSCULAR; INTRAVENOUS; SUBCUTANEOUS ONCE
Refills: 0 | Status: DISCONTINUED | OUTPATIENT
Start: 2022-01-01 | End: 2022-01-01

## 2022-01-01 RX ORDER — SODIUM ZIRCONIUM CYCLOSILICATE 10 G/10G
10 POWDER, FOR SUSPENSION ORAL
Refills: 0 | Status: DISCONTINUED | OUTPATIENT
Start: 2022-01-01 | End: 2022-01-01

## 2022-01-01 RX ORDER — CARVEDILOL PHOSPHATE 80 MG/1
3.12 CAPSULE, EXTENDED RELEASE ORAL ONCE
Refills: 0 | Status: COMPLETED | OUTPATIENT
Start: 2022-01-01 | End: 2022-01-01

## 2022-01-01 RX ORDER — PIPERACILLIN AND TAZOBACTAM 4; .5 G/20ML; G/20ML
3.38 INJECTION, POWDER, LYOPHILIZED, FOR SOLUTION INTRAVENOUS ONCE
Refills: 0 | Status: COMPLETED | OUTPATIENT
Start: 2022-01-01 | End: 2022-01-01

## 2022-01-01 RX ORDER — DIVALPROEX SODIUM 500 MG/1
1 TABLET, DELAYED RELEASE ORAL
Qty: 0 | Refills: 0 | DISCHARGE
Start: 2022-01-01

## 2022-01-01 RX ORDER — MAGNESIUM OXIDE 400 MG ORAL TABLET 241.3 MG
400 TABLET ORAL
Refills: 0 | Status: DISCONTINUED | OUTPATIENT
Start: 2022-01-01 | End: 2022-01-01

## 2022-01-01 RX ORDER — LEVOTHYROXINE SODIUM 125 MCG
64 TABLET ORAL AT BEDTIME
Refills: 0 | Status: DISCONTINUED | OUTPATIENT
Start: 2022-01-01 | End: 2022-01-01

## 2022-01-01 RX ORDER — WARFARIN SODIUM 2.5 MG/1
7 TABLET ORAL ONCE
Refills: 0 | Status: COMPLETED | OUTPATIENT
Start: 2022-01-01 | End: 2022-01-01

## 2022-01-01 RX ORDER — MIDAZOLAM HYDROCHLORIDE 1 MG/ML
2 INJECTION, SOLUTION INTRAMUSCULAR; INTRAVENOUS ONCE
Refills: 0 | Status: DISCONTINUED | OUTPATIENT
Start: 2022-01-01 | End: 2022-01-01

## 2022-01-01 RX ORDER — FUROSEMIDE 40 MG
40 TABLET ORAL DAILY
Refills: 0 | Status: DISCONTINUED | OUTPATIENT
Start: 2022-01-01 | End: 2022-01-01

## 2022-01-01 RX ORDER — CALCIUM CARBONATE 500(1250)
3 TABLET ORAL ONCE
Refills: 0 | Status: COMPLETED | OUTPATIENT
Start: 2022-01-01 | End: 2022-01-01

## 2022-01-01 RX ORDER — INSULIN LISPRO 100/ML
VIAL (ML) SUBCUTANEOUS EVERY 4 HOURS
Refills: 0 | Status: DISCONTINUED | OUTPATIENT
Start: 2022-01-01 | End: 2022-01-01

## 2022-01-01 RX ORDER — PANTOPRAZOLE SODIUM 20 MG/1
80 TABLET, DELAYED RELEASE ORAL ONCE
Refills: 0 | Status: COMPLETED | OUTPATIENT
Start: 2022-01-01 | End: 2022-01-01

## 2022-01-01 RX ORDER — SIROLIMUS 1 MG/ML
7 SOLUTION ORAL
Qty: 0 | Refills: 0 | DISCHARGE
Start: 2022-01-01

## 2022-01-01 RX ORDER — SEVELAMER HYDROCHLORIDE 800 MG/1
800 TABLET, FILM COATED ORAL 3 TIMES DAILY
Refills: 0 | Status: ACTIVE | COMMUNITY
Start: 2022-01-01

## 2022-01-01 RX ORDER — AMLODIPINE BESYLATE 2.5 MG/1
10 TABLET ORAL ONCE
Refills: 0 | Status: COMPLETED | OUTPATIENT
Start: 2022-01-01 | End: 2022-01-01

## 2022-01-01 RX ORDER — LACOSAMIDE 50 MG/1
200 TABLET ORAL ONCE
Refills: 0 | Status: DISCONTINUED | OUTPATIENT
Start: 2022-01-01 | End: 2022-01-01

## 2022-01-01 RX ORDER — EVEROLIMUS 10 MG/1
0.5 TABLET ORAL EVERY 12 HOURS
Refills: 0 | Status: DISCONTINUED | OUTPATIENT
Start: 2022-01-01 | End: 2022-01-01

## 2022-01-01 RX ORDER — HYDRALAZINE HCL 50 MG
75 TABLET ORAL THREE TIMES A DAY
Refills: 0 | Status: DISCONTINUED | OUTPATIENT
Start: 2022-01-01 | End: 2022-01-01

## 2022-01-01 RX ORDER — HUMAN INSULIN 100 [IU]/ML
2 INJECTION, SUSPENSION SUBCUTANEOUS EVERY 8 HOURS
Refills: 0 | Status: DISCONTINUED | OUTPATIENT
Start: 2022-01-01 | End: 2022-01-01

## 2022-01-01 RX ORDER — SIROLIMUS 1 MG/ML
2.5 SOLUTION ORAL
Refills: 0 | Status: DISCONTINUED | OUTPATIENT
Start: 2022-01-01 | End: 2022-01-01

## 2022-01-01 RX ORDER — INSULIN HUMAN 100 [IU]/ML
4 INJECTION, SOLUTION SUBCUTANEOUS
Qty: 100 | Refills: 0 | Status: DISCONTINUED | OUTPATIENT
Start: 2022-01-01 | End: 2022-01-01

## 2022-01-01 RX ORDER — INSULIN GLARGINE 100 [IU]/ML
12 INJECTION, SOLUTION SUBCUTANEOUS AT BEDTIME
Refills: 0 | Status: DISCONTINUED | OUTPATIENT
Start: 2022-01-01 | End: 2022-01-01

## 2022-01-01 RX ORDER — LEVETIRACETAM 250 MG/1
250 TABLET, FILM COATED ORAL TWICE DAILY
Qty: 60 | Refills: 0 | Status: DISCONTINUED | COMMUNITY
Start: 2022-01-01 | End: 2022-01-01

## 2022-01-01 RX ORDER — DOXAZOSIN MESYLATE 4 MG
8 TABLET ORAL AT BEDTIME
Refills: 0 | Status: DISCONTINUED | OUTPATIENT
Start: 2022-01-01 | End: 2022-01-01

## 2022-01-01 RX ORDER — INSULIN GLARGINE 100 [IU]/ML
7 INJECTION, SOLUTION SUBCUTANEOUS
Refills: 0 | Status: DISCONTINUED | OUTPATIENT
Start: 2022-01-01 | End: 2022-01-01

## 2022-01-01 RX ORDER — APIXABAN 2.5 MG/1
2.5 TABLET, FILM COATED ORAL EVERY 12 HOURS
Refills: 0 | Status: DISCONTINUED | OUTPATIENT
Start: 2022-01-01 | End: 2022-01-01

## 2022-01-01 RX ORDER — FENTANYL CITRATE 50 UG/ML
50 INJECTION INTRAVENOUS ONCE
Refills: 0 | Status: DISCONTINUED | OUTPATIENT
Start: 2022-01-01 | End: 2022-01-01

## 2022-01-01 RX ORDER — CEFEPIME 1 G/1
INJECTION, POWDER, FOR SOLUTION INTRAMUSCULAR; INTRAVENOUS
Refills: 0 | Status: DISCONTINUED | OUTPATIENT
Start: 2022-01-01 | End: 2022-01-01

## 2022-01-01 RX ORDER — IRON SUCROSE 20 MG/ML
200 INJECTION, SOLUTION INTRAVENOUS ONCE
Refills: 0 | Status: COMPLETED | OUTPATIENT
Start: 2022-01-01 | End: 2022-01-01

## 2022-01-01 RX ORDER — ENOXAPARIN SODIUM 100 MG/ML
60 INJECTION SUBCUTANEOUS EVERY 12 HOURS
Refills: 0 | Status: COMPLETED | OUTPATIENT
Start: 2022-01-01 | End: 2022-01-01

## 2022-01-01 RX ORDER — VALPROIC ACID 250 MG/1
250 CAPSULE, LIQUID FILLED ORAL
Qty: 90 | Refills: 0 | Status: DISCONTINUED | COMMUNITY
Start: 2022-01-01 | End: 2022-01-01

## 2022-01-01 RX ORDER — WARFARIN SODIUM 2.5 MG/1
10 TABLET ORAL ONCE
Refills: 0 | Status: COMPLETED | OUTPATIENT
Start: 2022-01-01 | End: 2022-01-01

## 2022-01-01 RX ORDER — FLUCONAZOLE 150 MG/1
200 TABLET ORAL DAILY
Refills: 0 | Status: DISCONTINUED | OUTPATIENT
Start: 2022-01-01 | End: 2022-01-01

## 2022-01-01 RX ORDER — SIROLIMUS 1 MG/ML
4 SOLUTION ORAL
Refills: 0 | Status: DISCONTINUED | OUTPATIENT
Start: 2022-01-01 | End: 2022-01-01

## 2022-01-01 RX ORDER — MONTELUKAST 4 MG/1
1 TABLET, CHEWABLE ORAL
Qty: 30 | Refills: 0
Start: 2022-01-01 | End: 2022-01-01

## 2022-01-01 RX ORDER — BRIVARACETAM 25 MG/1
100 TABLET, FILM COATED ORAL ONCE
Refills: 0 | Status: DISCONTINUED | OUTPATIENT
Start: 2022-01-01 | End: 2022-01-01

## 2022-01-01 RX ORDER — WARFARIN SODIUM 2.5 MG/1
2 TABLET ORAL ONCE
Refills: 0 | Status: DISCONTINUED | OUTPATIENT
Start: 2022-01-01 | End: 2022-01-01

## 2022-01-01 RX ORDER — ATOVAQUONE 750 MG/5ML
1500 SUSPENSION ORAL
Refills: 0 | Status: DISCONTINUED | OUTPATIENT
Start: 2022-01-01 | End: 2022-01-01

## 2022-01-01 RX ORDER — FUROSEMIDE 40 MG
80 TABLET ORAL
Refills: 0 | Status: COMPLETED | OUTPATIENT
Start: 2022-01-01 | End: 2022-01-01

## 2022-01-01 RX ORDER — HEPARIN SODIUM 5000 [USP'U]/ML
5000 INJECTION INTRAVENOUS; SUBCUTANEOUS EVERY 12 HOURS
Refills: 0 | Status: DISCONTINUED | OUTPATIENT
Start: 2022-01-01 | End: 2022-01-01

## 2022-01-01 RX ORDER — VALPROIC ACID (AS SODIUM SALT) 250 MG/5ML
1000 SOLUTION, ORAL ORAL
Refills: 0 | Status: DISCONTINUED | OUTPATIENT
Start: 2022-01-01 | End: 2022-01-01

## 2022-01-01 RX ORDER — LACTULOSE 10 G/15ML
200 SOLUTION ORAL ONCE
Refills: 0 | Status: DISCONTINUED | OUTPATIENT
Start: 2022-01-01 | End: 2022-01-01

## 2022-01-01 RX ORDER — CHLORPROMAZINE HCL 10 MG
10 TABLET ORAL ONCE
Refills: 0 | Status: COMPLETED | OUTPATIENT
Start: 2022-01-01 | End: 2022-01-01

## 2022-01-01 RX ORDER — SEVELAMER CARBONATE 2400 MG/1
800 POWDER, FOR SUSPENSION ORAL THREE TIMES A DAY
Refills: 0 | Status: DISCONTINUED | OUTPATIENT
Start: 2022-01-01 | End: 2022-01-01

## 2022-01-01 RX ORDER — CEFEPIME 1 G/1
1000 INJECTION, POWDER, FOR SOLUTION INTRAMUSCULAR; INTRAVENOUS EVERY 24 HOURS
Refills: 0 | Status: DISCONTINUED | OUTPATIENT
Start: 2022-01-01 | End: 2022-01-01

## 2022-01-01 RX ORDER — PANTOPRAZOLE SODIUM 20 MG/1
1 TABLET, DELAYED RELEASE ORAL
Qty: 0 | Refills: 0 | DISCHARGE

## 2022-01-01 RX ORDER — APIXABAN 2.5 MG/1
2.5 TABLET, FILM COATED ORAL
Refills: 0 | Status: DISCONTINUED | OUTPATIENT
Start: 2022-01-01 | End: 2022-01-01

## 2022-01-01 RX ORDER — WARFARIN SODIUM 2.5 MG/1
5 TABLET ORAL AT BEDTIME
Refills: 0 | Status: COMPLETED | OUTPATIENT
Start: 2022-01-01 | End: 2022-01-01

## 2022-01-01 RX ORDER — VALGANCICLOVIR HYDROCHLORIDE 450 MG/1
450 TABLET ORAL
Qty: 12 | Refills: 11 | Status: ACTIVE | COMMUNITY
Start: 2022-01-01 | End: 1900-01-01

## 2022-01-01 RX ORDER — LACOSAMIDE 50 MG/1
100 TABLET ORAL
Refills: 0 | Status: DISCONTINUED | OUTPATIENT
Start: 2022-01-01 | End: 2022-01-01

## 2022-01-01 RX ORDER — BRIVARACETAM 25 MG/1
100 TABLET, FILM COATED ORAL
Refills: 0 | Status: DISCONTINUED | OUTPATIENT
Start: 2022-01-01 | End: 2022-01-01

## 2022-01-01 RX ORDER — TACROLIMUS 5 MG/1
0.5 CAPSULE ORAL ONCE
Refills: 0 | Status: COMPLETED | OUTPATIENT
Start: 2022-01-01 | End: 2022-01-01

## 2022-01-01 RX ORDER — FUROSEMIDE 40 MG
1 TABLET ORAL
Qty: 0 | Refills: 0 | DISCHARGE
Start: 2022-01-01

## 2022-01-01 RX ORDER — SODIUM CHLORIDE 9 MG/ML
500 INJECTION INTRAMUSCULAR; INTRAVENOUS; SUBCUTANEOUS ONCE
Refills: 0 | Status: COMPLETED | OUTPATIENT
Start: 2022-01-01 | End: 2022-01-01

## 2022-01-01 RX ORDER — TACROLIMUS 5 MG/1
3 CAPSULE ORAL
Refills: 0 | Status: DISCONTINUED | OUTPATIENT
Start: 2022-01-01 | End: 2022-01-01

## 2022-01-01 RX ORDER — INSULIN GLARGINE 100 [IU]/ML
3 INJECTION, SOLUTION SUBCUTANEOUS AT BEDTIME
Refills: 0 | Status: DISCONTINUED | OUTPATIENT
Start: 2022-01-01 | End: 2022-01-01

## 2022-01-01 RX ORDER — FINASTERIDE 5 MG/1
1 TABLET, FILM COATED ORAL
Qty: 0 | Refills: 0 | DISCHARGE
Start: 2022-01-01

## 2022-01-01 RX ORDER — LEVETIRACETAM 250 MG/1
750 TABLET, FILM COATED ORAL
Refills: 0 | Status: DISCONTINUED | OUTPATIENT
Start: 2022-01-01 | End: 2022-01-01

## 2022-01-01 RX ORDER — TRAMADOL HYDROCHLORIDE 50 MG/1
25 TABLET ORAL ONCE
Refills: 0 | Status: DISCONTINUED | OUTPATIENT
Start: 2022-01-01 | End: 2022-01-01

## 2022-01-01 RX ORDER — TACROLIMUS 5 MG/1
1 CAPSULE ORAL
Refills: 0 | Status: DISCONTINUED | OUTPATIENT
Start: 2022-01-01 | End: 2022-01-01

## 2022-01-01 RX ORDER — NYSTATIN 100000 [USP'U]/ML
100000 SUSPENSION ORAL 4 TIMES DAILY
Qty: 2 | Refills: 2 | Status: COMPLETED | COMMUNITY
Start: 2022-01-01 | End: 2022-01-01

## 2022-01-01 RX ORDER — SIROLIMUS 1 MG/ML
1.5 SOLUTION ORAL
Refills: 0 | Status: DISCONTINUED | OUTPATIENT
Start: 2022-01-01 | End: 2022-01-01

## 2022-01-01 RX ORDER — LEVOTHYROXINE SODIUM 50 UG/1
50 CAPSULE ORAL DAILY
Qty: 90 | Refills: 1 | Status: DISCONTINUED | COMMUNITY
Start: 2020-12-04 | End: 2022-01-01

## 2022-01-01 RX ORDER — DOXAZOSIN MESYLATE 4 MG
1 TABLET ORAL
Qty: 0 | Refills: 0 | DISCHARGE
Start: 2022-01-01

## 2022-01-01 RX ORDER — TACROLIMUS 5 MG/1
5 CAPSULE ORAL
Refills: 0 | Status: DISCONTINUED | OUTPATIENT
Start: 2022-01-01 | End: 2022-01-01

## 2022-01-01 RX ORDER — ACETAMINOPHEN 500 MG
500 TABLET ORAL ONCE
Refills: 0 | Status: COMPLETED | OUTPATIENT
Start: 2022-01-01 | End: 2022-01-01

## 2022-01-01 RX ORDER — FOSPHENYTOIN 50 MG/ML
100 INJECTION INTRAMUSCULAR; INTRAVENOUS ONCE
Refills: 0 | Status: COMPLETED | OUTPATIENT
Start: 2022-01-01 | End: 2022-01-01

## 2022-01-01 RX ORDER — INSULIN GLARGINE 100 [IU]/ML
6 INJECTION, SOLUTION SUBCUTANEOUS AT BEDTIME
Refills: 0 | Status: DISCONTINUED | OUTPATIENT
Start: 2022-01-01 | End: 2022-01-01

## 2022-01-01 RX ORDER — WARFARIN SODIUM 2.5 MG/1
7.5 TABLET ORAL ONCE
Refills: 0 | Status: COMPLETED | OUTPATIENT
Start: 2022-01-01 | End: 2022-01-01

## 2022-01-01 RX ORDER — HYDRALAZINE HCL 50 MG
25 TABLET ORAL EVERY 8 HOURS
Refills: 0 | Status: DISCONTINUED | OUTPATIENT
Start: 2022-01-01 | End: 2022-01-01

## 2022-01-01 RX ORDER — IRON SUCROSE 20 MG/ML
100 INJECTION, SOLUTION INTRAVENOUS ONCE
Refills: 0 | Status: COMPLETED | OUTPATIENT
Start: 2022-01-01 | End: 2022-01-01

## 2022-01-01 RX ORDER — TELMISARTAN 80 MG/1
80 TABLET ORAL DAILY
Qty: 90 | Refills: 3 | Status: DISCONTINUED | COMMUNITY
Start: 2021-08-05 | End: 2022-01-01

## 2022-01-01 RX ORDER — LACOSAMIDE 50 MG/1
100 TABLET ORAL ONCE
Refills: 0 | Status: DISCONTINUED | OUTPATIENT
Start: 2022-01-01 | End: 2022-01-01

## 2022-01-01 RX ORDER — BLOOD SUGAR DIAGNOSTIC
STRIP MISCELLANEOUS
Qty: 4 | Refills: 3 | Status: ACTIVE | COMMUNITY
Start: 2020-09-08 | End: 1900-01-01

## 2022-01-01 RX ORDER — INSULIN GLARGINE 100 [IU]/ML
10 INJECTION, SOLUTION SUBCUTANEOUS AT BEDTIME
Refills: 0 | Status: DISCONTINUED | OUTPATIENT
Start: 2022-01-01 | End: 2022-01-01

## 2022-01-01 RX ORDER — FUROSEMIDE 40 MG
1 TABLET ORAL
Qty: 30 | Refills: 0
Start: 2022-01-01 | End: 2022-01-01

## 2022-01-01 RX ORDER — CEFAZOLIN SODIUM 1 G
2000 VIAL (EA) INJECTION ONCE
Refills: 0 | Status: DISCONTINUED | OUTPATIENT
Start: 2022-01-01 | End: 2022-01-01

## 2022-01-01 RX ORDER — MYCOPHENOLATE MOFETIL 250 MG/1
500 CAPSULE ORAL
Qty: 120 | Refills: 0
Start: 2022-01-01 | End: 2022-01-01

## 2022-01-01 RX ORDER — DEXTRAN 70, POLYETHYLENE GLYCOL 400, POVIDONE, TETRAHYDROZOLINE HCL 100; 1000; 1000; 50 MG/100ML; MG/100ML; MG/100ML; MG/100ML
0.05-0.1-1-1 SOLUTION/ DROPS OPHTHALMIC EVERY 6 HOURS
Qty: 1 | Refills: 3 | Status: ACTIVE | COMMUNITY
Start: 2022-01-01 | End: 1900-01-01

## 2022-01-01 RX ORDER — CHLORHEXIDINE GLUCONATE 213 G/1000ML
15 SOLUTION TOPICAL EVERY 12 HOURS
Refills: 0 | Status: DISCONTINUED | OUTPATIENT
Start: 2022-01-01 | End: 2022-01-01

## 2022-01-01 RX ORDER — INSULIN LISPRO 100/ML
2 VIAL (ML) SUBCUTANEOUS ONCE
Refills: 0 | Status: DISCONTINUED | OUTPATIENT
Start: 2022-01-01 | End: 2022-01-01

## 2022-01-01 RX ORDER — LABETALOL HCL 100 MG
5 TABLET ORAL ONCE
Refills: 0 | Status: COMPLETED | OUTPATIENT
Start: 2022-01-01 | End: 2022-01-01

## 2022-01-01 RX ORDER — SEVELAMER CARBONATE 2400 MG/1
1 POWDER, FOR SUSPENSION ORAL
Qty: 90 | Refills: 0
Start: 2022-01-01 | End: 2022-01-01

## 2022-01-01 RX ORDER — SODIUM BICARBONATE 1 MEQ/ML
1300 SYRINGE (ML) INTRAVENOUS THREE TIMES A DAY
Refills: 0 | Status: DISCONTINUED | OUTPATIENT
Start: 2022-01-01 | End: 2022-01-01

## 2022-01-01 RX ORDER — DIVALPROEX SODIUM 500 MG/1
500 TABLET, DELAYED RELEASE ORAL TWICE DAILY
Qty: 6 | Refills: 0 | Status: DISCONTINUED | COMMUNITY
Start: 2022-01-01 | End: 2022-01-01

## 2022-01-01 RX ORDER — ONDANSETRON 8 MG/1
4 TABLET, FILM COATED ORAL EVERY 6 HOURS
Refills: 0 | Status: DISCONTINUED | OUTPATIENT
Start: 2022-01-01 | End: 2022-01-01

## 2022-01-01 RX ORDER — INSULIN LISPRO 100/ML
5 VIAL (ML) SUBCUTANEOUS ONCE
Refills: 0 | Status: COMPLETED | OUTPATIENT
Start: 2022-01-01 | End: 2022-01-01

## 2022-01-01 RX ORDER — FENTANYL CITRATE 50 UG/ML
0.5 INJECTION INTRAVENOUS
Qty: 2500 | Refills: 0 | Status: DISCONTINUED | OUTPATIENT
Start: 2022-01-01 | End: 2022-01-01

## 2022-01-01 RX ORDER — PANTOPRAZOLE SODIUM 40 MG/1
40 GRANULE, DELAYED RELEASE ORAL
Refills: 0 | Status: ACTIVE | COMMUNITY

## 2022-01-01 RX ORDER — CARVEDILOL PHOSPHATE 80 MG/1
1 CAPSULE, EXTENDED RELEASE ORAL
Qty: 0 | Refills: 0 | DISCHARGE

## 2022-01-01 RX ORDER — ACETAMINOPHEN 500 MG
975 TABLET ORAL ONCE
Refills: 0 | Status: COMPLETED | OUTPATIENT
Start: 2022-01-01 | End: 2022-01-01

## 2022-01-01 RX ORDER — PANTOPRAZOLE SODIUM 20 MG/1
40 TABLET, DELAYED RELEASE ORAL ONCE
Refills: 0 | Status: COMPLETED | OUTPATIENT
Start: 2022-01-01 | End: 2022-01-01

## 2022-01-01 RX ORDER — SUCRALFATE 1 G/1
1 TABLET ORAL
Qty: 60 | Refills: 0 | Status: DISCONTINUED | COMMUNITY
Start: 2022-01-01

## 2022-01-01 RX ORDER — TACROLIMUS 5 MG/1
1 CAPSULE ORAL ONCE
Refills: 0 | Status: COMPLETED | OUTPATIENT
Start: 2022-01-01 | End: 2022-01-01

## 2022-01-01 RX ORDER — PROTAMINE SULFATE 10 MG/ML
30 AMPUL (ML) INTRAVENOUS ONCE
Refills: 0 | Status: COMPLETED | OUTPATIENT
Start: 2022-01-01 | End: 2022-01-01

## 2022-01-01 RX ORDER — INSULIN HUMAN 100 [IU]/ML
10 INJECTION, SOLUTION SUBCUTANEOUS ONCE
Refills: 0 | Status: COMPLETED | OUTPATIENT
Start: 2022-01-01 | End: 2022-01-01

## 2022-01-01 RX ORDER — SEVELAMER CARBONATE 2400 MG/1
800 POWDER, FOR SUSPENSION ORAL EVERY 8 HOURS
Refills: 0 | Status: DISCONTINUED | OUTPATIENT
Start: 2022-01-01 | End: 2022-01-01

## 2022-01-01 RX ORDER — MONTELUKAST 10 MG/1
10 TABLET, FILM COATED ORAL
Qty: 90 | Refills: 0 | Status: DISCONTINUED | COMMUNITY
Start: 2022-01-01

## 2022-01-01 RX ORDER — NIFEDIPINE 30 MG
60 TABLET, EXTENDED RELEASE 24 HR ORAL
Refills: 0 | Status: DISCONTINUED | OUTPATIENT
Start: 2022-01-01 | End: 2022-01-01

## 2022-01-01 RX ORDER — PYRIDOXINE HCL (VITAMIN B6) 100 MG
1 TABLET ORAL
Qty: 0 | Refills: 0 | DISCHARGE

## 2022-01-01 RX ORDER — APIXABAN 2.5 MG/1
5 TABLET, FILM COATED ORAL EVERY 12 HOURS
Refills: 0 | Status: DISCONTINUED | OUTPATIENT
Start: 2022-01-01 | End: 2022-01-01

## 2022-01-01 RX ORDER — DIVALPROEX SODIUM 500 MG/1
1000 TABLET, DELAYED RELEASE ORAL
Refills: 0 | Status: DISCONTINUED | OUTPATIENT
Start: 2022-01-01 | End: 2022-01-01

## 2022-01-01 RX ORDER — CLOPIDOGREL BISULFATE 75 MG/1
1 TABLET, FILM COATED ORAL
Qty: 0 | Refills: 0 | DISCHARGE

## 2022-01-01 RX ORDER — MYCOPHENOLATE MOFETIL 250 MG/1
1000 CAPSULE ORAL ONCE
Refills: 0 | Status: COMPLETED | OUTPATIENT
Start: 2022-01-01 | End: 2022-01-01

## 2022-01-01 RX ORDER — SIROLIMUS 1 MG/ML
5 SOLUTION ORAL
Refills: 0 | Status: DISCONTINUED | OUTPATIENT
Start: 2022-01-01 | End: 2022-01-01

## 2022-01-01 RX ORDER — INSULIN GLARGINE 100 [IU]/ML
6 INJECTION, SOLUTION SUBCUTANEOUS
Qty: 0 | Refills: 0 | DISCHARGE
Start: 2022-01-01

## 2022-01-01 RX ORDER — POLYETHYLENE GLYCOL 3350 17 G/17G
17 POWDER, FOR SOLUTION ORAL
Refills: 0 | Status: DISCONTINUED | OUTPATIENT
Start: 2022-01-01 | End: 2022-01-01

## 2022-01-01 RX ORDER — LEVOFLOXACIN 500 MG/1
500 TABLET, FILM COATED ORAL DAILY
Refills: 0 | Status: DISCONTINUED | COMMUNITY
Start: 2022-01-01 | End: 2022-01-01

## 2022-01-01 RX ORDER — LEVETIRACETAM 250 MG/1
125 TABLET, FILM COATED ORAL ONCE
Refills: 0 | Status: COMPLETED | OUTPATIENT
Start: 2022-01-01 | End: 2022-01-01

## 2022-01-01 RX ORDER — ONDANSETRON 8 MG/1
4 TABLET, FILM COATED ORAL EVERY 8 HOURS
Refills: 0 | Status: DISCONTINUED | OUTPATIENT
Start: 2022-01-01 | End: 2022-01-01

## 2022-01-01 RX ORDER — LACOSAMIDE 50 MG/1
100 TABLET ORAL EVERY 12 HOURS
Refills: 0 | Status: DISCONTINUED | OUTPATIENT
Start: 2022-01-01 | End: 2022-01-01

## 2022-01-01 RX ORDER — PEN NEEDLE, DIABETIC 29 G X1/2"
32G X 4 MM NEEDLE, DISPOSABLE MISCELLANEOUS
Qty: 1 | Refills: 11 | Status: ACTIVE | COMMUNITY
Start: 2022-01-01 | End: 1900-01-01

## 2022-01-01 RX ORDER — APIXABAN 2.5 MG/1
2.5 TABLET, FILM COATED ORAL
Qty: 60 | Refills: 1 | Status: DISCONTINUED | COMMUNITY
Start: 2022-01-01 | End: 2022-01-01

## 2022-01-01 RX ORDER — MYCOPHENOLATE MOFETIL 250 MG/1
250 CAPSULE ORAL
Qty: 120 | Refills: 11 | Status: DISCONTINUED | COMMUNITY
Start: 2022-01-01 | End: 2022-01-01

## 2022-01-01 RX ORDER — WARFARIN 5 MG/1
5 TABLET ORAL DAILY
Qty: 30 | Refills: 0 | Status: ACTIVE | COMMUNITY
Start: 2022-01-01 | End: 1900-01-01

## 2022-01-01 RX ORDER — DIPHENHYDRAMINE HCL 50 MG
25 CAPSULE ORAL ONCE
Refills: 0 | Status: DISCONTINUED | OUTPATIENT
Start: 2022-01-01 | End: 2022-01-01

## 2022-01-01 RX ORDER — SENNA PLUS 8.6 MG/1
2 TABLET ORAL
Qty: 0 | Refills: 0 | DISCHARGE
Start: 2022-01-01

## 2022-01-01 RX ORDER — CEFAZOLIN SODIUM 1 G
1000 VIAL (EA) INJECTION ONCE
Refills: 0 | Status: COMPLETED | OUTPATIENT
Start: 2022-01-01 | End: 2022-01-01

## 2022-01-01 RX ORDER — SODIUM ZIRCONIUM CYCLOSILICATE 10 G/10G
10 POWDER, FOR SUSPENSION ORAL EVERY 8 HOURS
Refills: 0 | Status: COMPLETED | OUTPATIENT
Start: 2022-01-01 | End: 2022-01-01

## 2022-01-01 RX ORDER — INSULIN LISPRO 100/ML
10 VIAL (ML) SUBCUTANEOUS
Qty: 0 | Refills: 0 | DISCHARGE
Start: 2022-01-01

## 2022-01-01 RX ORDER — INSULIN HUMAN 100 [IU]/ML
3 INJECTION, SOLUTION SUBCUTANEOUS ONCE
Refills: 0 | Status: COMPLETED | OUTPATIENT
Start: 2022-01-01 | End: 2022-01-01

## 2022-01-01 RX ORDER — INSULIN LISPRO 100/ML
2 VIAL (ML) SUBCUTANEOUS
Refills: 0 | Status: DISCONTINUED | OUTPATIENT
Start: 2022-01-01 | End: 2022-01-01

## 2022-01-01 RX ORDER — INSULIN GLARGINE 100 [IU]/ML
10 INJECTION, SOLUTION SUBCUTANEOUS ONCE
Refills: 0 | Status: COMPLETED | OUTPATIENT
Start: 2022-01-01 | End: 2022-01-01

## 2022-01-01 RX ORDER — INSULIN LISPRO 100/ML
10 VIAL (ML) SUBCUTANEOUS
Refills: 0 | Status: DISCONTINUED | OUTPATIENT
Start: 2022-01-01 | End: 2022-01-01

## 2022-01-01 RX ORDER — SODIUM ZIRCONIUM CYCLOSILICATE 10 G/10G
10 POWDER, FOR SUSPENSION ORAL
Refills: 0 | Status: COMPLETED | OUTPATIENT
Start: 2022-01-01 | End: 2022-01-01

## 2022-01-01 RX ORDER — SODIUM POLYSTYRENE SULFONATE 4.1 MEQ/G
30 POWDER, FOR SUSPENSION ORAL ONCE
Refills: 0 | Status: COMPLETED | OUTPATIENT
Start: 2022-01-01 | End: 2022-01-01

## 2022-01-01 RX ORDER — INSULIN GLARGINE 100 [IU]/ML
4 INJECTION, SOLUTION SUBCUTANEOUS ONCE
Refills: 0 | Status: COMPLETED | OUTPATIENT
Start: 2022-01-01 | End: 2022-01-01

## 2022-01-01 RX ORDER — ERYTHROPOIETIN 10000 [IU]/ML
10000 INJECTION, SOLUTION INTRAVENOUS; SUBCUTANEOUS
Qty: 4 | Refills: 1 | Status: DISCONTINUED | COMMUNITY
Start: 2022-01-01 | End: 2022-01-01

## 2022-01-01 RX ORDER — SODIUM ZIRCONIUM CYCLOSILICATE 10 G/10G
10 POWDER, FOR SUSPENSION ORAL ONCE
Refills: 0 | Status: COMPLETED | OUTPATIENT
Start: 2022-01-01 | End: 2022-01-01

## 2022-01-01 RX ORDER — INSULIN GLARGINE 100 [IU]/ML
4 INJECTION, SOLUTION SUBCUTANEOUS AT BEDTIME
Refills: 0 | Status: DISCONTINUED | OUTPATIENT
Start: 2022-01-01 | End: 2022-01-01

## 2022-01-01 RX ORDER — WARFARIN SODIUM 2.5 MG/1
8 TABLET ORAL ONCE
Refills: 0 | Status: COMPLETED | OUTPATIENT
Start: 2022-01-01 | End: 2022-01-01

## 2022-01-01 RX ORDER — INSULIN LISPRO 100/ML
5 VIAL (ML) SUBCUTANEOUS
Qty: 0 | Refills: 0 | DISCHARGE
Start: 2022-01-01

## 2022-01-01 RX ORDER — NIFEDIPINE 30 MG
30 TABLET, EXTENDED RELEASE 24 HR ORAL DAILY
Refills: 0 | Status: DISCONTINUED | OUTPATIENT
Start: 2022-01-01 | End: 2022-01-01

## 2022-01-01 RX ORDER — DIVALPROEX SODIUM 500 MG/1
500 TABLET, DELAYED RELEASE ORAL
Refills: 0 | Status: DISCONTINUED | OUTPATIENT
Start: 2022-01-01 | End: 2022-01-01

## 2022-01-01 RX ORDER — VALPROIC ACID (AS SODIUM SALT) 250 MG/5ML
1500 SOLUTION, ORAL ORAL ONCE
Refills: 0 | Status: COMPLETED | OUTPATIENT
Start: 2022-01-01 | End: 2022-01-01

## 2022-01-01 RX ORDER — MULTIVIT-MIN/FERROUS GLUCONATE 9 MG/15 ML
15 LIQUID (ML) ORAL DAILY
Refills: 0 | Status: DISCONTINUED | OUTPATIENT
Start: 2022-01-01 | End: 2022-01-01

## 2022-01-01 RX ORDER — HEPARIN SODIUM 5000 [USP'U]/ML
5000 INJECTION INTRAVENOUS; SUBCUTANEOUS EVERY 8 HOURS
Refills: 0 | Status: DISCONTINUED | OUTPATIENT
Start: 2022-01-01 | End: 2022-01-01

## 2022-01-01 RX ORDER — NYSTATIN 500MM UNIT
500000 POWDER (EA) MISCELLANEOUS THREE TIMES A DAY
Refills: 0 | Status: DISCONTINUED | OUTPATIENT
Start: 2022-01-01 | End: 2022-01-01

## 2022-01-01 RX ORDER — INSULIN HUMAN 100 [IU]/ML
10 INJECTION, SOLUTION SUBCUTANEOUS ONCE
Refills: 0 | Status: DISCONTINUED | OUTPATIENT
Start: 2022-01-01 | End: 2022-01-01

## 2022-01-01 RX ORDER — APIXABAN 2.5 MG/1
1 TABLET, FILM COATED ORAL
Qty: 0 | Refills: 0 | DISCHARGE
Start: 2022-01-01

## 2022-01-01 RX ORDER — SODIUM CHLORIDE 9 MG/ML
500 INJECTION, SOLUTION INTRAVENOUS
Refills: 0 | Status: DISCONTINUED | OUTPATIENT
Start: 2022-01-01 | End: 2022-01-01

## 2022-01-01 RX ORDER — CARVEDILOL PHOSPHATE 80 MG/1
1 CAPSULE, EXTENDED RELEASE ORAL
Qty: 0 | Refills: 0 | DISCHARGE
Start: 2022-01-01

## 2022-01-01 RX ORDER — WARFARIN SODIUM 2.5 MG/1
7 TABLET ORAL AT BEDTIME
Refills: 0 | Status: DISCONTINUED | OUTPATIENT
Start: 2022-01-01 | End: 2022-01-01

## 2022-01-01 RX ORDER — INSULIN GLARGINE 100 [IU]/ML
100 INJECTION, SOLUTION SUBCUTANEOUS
Qty: 5 | Refills: 3 | Status: DISCONTINUED | COMMUNITY
Start: 2022-01-01 | End: 2022-01-01

## 2022-01-01 RX ORDER — OXYCODONE HYDROCHLORIDE 5 MG/1
2.5 TABLET ORAL EVERY 4 HOURS
Refills: 0 | Status: DISCONTINUED | OUTPATIENT
Start: 2022-01-01 | End: 2022-01-01

## 2022-01-01 RX ORDER — FOSPHENYTOIN 50 MG/ML
700 INJECTION INTRAMUSCULAR; INTRAVENOUS ONCE
Refills: 0 | Status: COMPLETED | OUTPATIENT
Start: 2022-01-01 | End: 2022-01-01

## 2022-01-01 RX ORDER — HEPARIN SODIUM 5000 [USP'U]/ML
5000 INJECTION INTRAVENOUS; SUBCUTANEOUS ONCE
Refills: 0 | Status: COMPLETED | OUTPATIENT
Start: 2022-01-01 | End: 2022-01-01

## 2022-01-01 RX ORDER — MONTELUKAST 4 MG/1
10 TABLET, CHEWABLE ORAL DAILY
Refills: 0 | Status: DISCONTINUED | OUTPATIENT
Start: 2022-01-01 | End: 2022-01-01

## 2022-01-01 RX ORDER — FUROSEMIDE 40 MG
40 TABLET ORAL
Refills: 0 | Status: DISCONTINUED | OUTPATIENT
Start: 2022-01-01 | End: 2022-01-01

## 2022-01-01 RX ORDER — TACROLIMUS 5 MG/1
5 CAPSULE ORAL
Qty: 0 | Refills: 0 | DISCHARGE
Start: 2022-01-01

## 2022-01-01 RX ORDER — INSULIN LISPRO 100/ML
4 VIAL (ML) SUBCUTANEOUS ONCE
Refills: 0 | Status: COMPLETED | OUTPATIENT
Start: 2022-01-01 | End: 2022-01-01

## 2022-01-01 RX ORDER — SIROLIMUS 1 MG/ML
7 SOLUTION ORAL
Qty: 14 | Refills: 0
Start: 2022-01-01

## 2022-01-01 RX ORDER — LEVETIRACETAM 250 MG/1
125 TABLET, FILM COATED ORAL
Refills: 0 | Status: DISCONTINUED | OUTPATIENT
Start: 2022-01-01 | End: 2022-01-01

## 2022-01-01 RX ORDER — ERYTHROPOIETIN 10000 [IU]/ML
10000 INJECTION, SOLUTION INTRAVENOUS; SUBCUTANEOUS ONCE
Refills: 0 | Status: COMPLETED | OUTPATIENT
Start: 2022-01-01 | End: 2022-01-01

## 2022-01-01 RX ORDER — INSULIN GLARGINE 100 [IU]/ML
7 INJECTION, SOLUTION SUBCUTANEOUS
Qty: 0 | Refills: 0 | DISCHARGE
Start: 2022-01-01

## 2022-01-01 RX ORDER — FLUCONAZOLE 150 MG/1
400 TABLET ORAL ONCE
Refills: 0 | Status: COMPLETED | OUTPATIENT
Start: 2022-01-01 | End: 2022-01-01

## 2022-01-01 RX ORDER — SODIUM,POTASSIUM PHOSPHATES 278-250MG
1 POWDER IN PACKET (EA) ORAL ONCE
Refills: 0 | Status: COMPLETED | OUTPATIENT
Start: 2022-01-01 | End: 2022-01-01

## 2022-01-01 RX ORDER — HYDROMORPHONE HYDROCHLORIDE 2 MG/ML
0.5 INJECTION INTRAMUSCULAR; INTRAVENOUS; SUBCUTANEOUS EVERY 4 HOURS
Refills: 0 | Status: DISCONTINUED | OUTPATIENT
Start: 2022-01-01 | End: 2022-01-01

## 2022-01-01 RX ORDER — SEVELAMER CARBONATE 2400 MG/1
800 POWDER, FOR SUSPENSION ORAL
Refills: 0 | Status: DISCONTINUED | OUTPATIENT
Start: 2022-01-01 | End: 2022-01-01

## 2022-01-01 RX ORDER — DOXAZOSIN MESYLATE 4 MG
1 TABLET ORAL
Qty: 60 | Refills: 0
Start: 2022-01-01 | End: 2022-01-01

## 2022-01-01 RX ORDER — MAGNESIUM SULFATE 500 MG/ML
2 VIAL (ML) INJECTION ONCE
Refills: 0 | Status: DISCONTINUED | OUTPATIENT
Start: 2022-01-01 | End: 2022-01-01

## 2022-01-01 RX ORDER — PREDNISONE 5 MG/1
5 TABLET ORAL
Qty: 42 | Refills: 0 | Status: DISCONTINUED | COMMUNITY
Start: 2022-01-01 | End: 2022-01-01

## 2022-01-01 RX ORDER — SODIUM CHLORIDE 9 MG/ML
500 INJECTION INTRAMUSCULAR; INTRAVENOUS; SUBCUTANEOUS
Refills: 0 | Status: DISCONTINUED | OUTPATIENT
Start: 2022-01-01 | End: 2022-01-01

## 2022-01-01 RX ORDER — APIXABAN 2.5 MG/1
1 TABLET, FILM COATED ORAL
Qty: 0 | Refills: 0 | DISCHARGE

## 2022-01-01 RX ORDER — ATORVASTATIN CALCIUM 80 MG/1
1 TABLET, FILM COATED ORAL
Qty: 0 | Refills: 0 | DISCHARGE

## 2022-01-01 RX ORDER — INSULIN HUMAN 100 [IU]/ML
4 INJECTION, SOLUTION SUBCUTANEOUS ONCE
Refills: 0 | Status: DISCONTINUED | OUTPATIENT
Start: 2022-01-01 | End: 2022-01-01

## 2022-01-01 RX ORDER — SIROLIMUS 1 MG/ML
7 SOLUTION ORAL
Refills: 0 | Status: DISCONTINUED | OUTPATIENT
Start: 2022-01-01 | End: 2022-01-01

## 2022-01-01 RX ORDER — SENNA PLUS 8.6 MG/1
10 TABLET ORAL AT BEDTIME
Refills: 0 | Status: DISCONTINUED | OUTPATIENT
Start: 2022-01-01 | End: 2022-01-01

## 2022-01-01 RX ORDER — NIFEDIPINE 30 MG
60 TABLET, EXTENDED RELEASE 24 HR ORAL EVERY 12 HOURS
Refills: 0 | Status: DISCONTINUED | OUTPATIENT
Start: 2022-01-01 | End: 2022-01-01

## 2022-01-01 RX ORDER — INSULIN HUMAN 100 [IU]/ML
INJECTION, SOLUTION SUBCUTANEOUS AT BEDTIME
Refills: 0 | Status: DISCONTINUED | OUTPATIENT
Start: 2022-01-01 | End: 2022-01-01

## 2022-01-01 RX ORDER — ENOXAPARIN SODIUM 100 MG/ML
40 INJECTION SUBCUTANEOUS EVERY 24 HOURS
Refills: 0 | Status: DISCONTINUED | OUTPATIENT
Start: 2022-01-01 | End: 2022-01-01

## 2022-01-01 RX ORDER — ERYTHROPOIETIN 10000 [IU]/ML
10000 INJECTION, SOLUTION INTRAVENOUS; SUBCUTANEOUS
Qty: 0 | Refills: 0 | DISCHARGE
Start: 2022-01-01

## 2022-01-01 RX ORDER — DIVALPROEX SODIUM 500 MG/1
1 TABLET, DELAYED RELEASE ORAL
Qty: 0 | Refills: 0 | DISCHARGE

## 2022-01-01 RX ORDER — IRON SUCROSE 20 MG/ML
100 INJECTION, SOLUTION INTRAVENOUS
Refills: 0 | Status: DISCONTINUED | OUTPATIENT
Start: 2022-01-01 | End: 2022-01-01

## 2022-01-01 RX ORDER — SODIUM POLYSTYRENE SULFONATE 4.1 MEQ/G
POWDER, FOR SUSPENSION ORAL; RECTAL
Qty: 454 | Refills: 0 | Status: DISCONTINUED | COMMUNITY
Start: 2022-01-01

## 2022-01-01 RX ORDER — FUROSEMIDE 80 MG/1
80 TABLET ORAL
Qty: 30 | Refills: 4 | Status: ACTIVE | COMMUNITY
Start: 2021-11-04 | End: 1900-01-01

## 2022-01-01 RX ORDER — SIROLIMUS 1 MG/ML
2 SOLUTION ORAL
Refills: 0 | Status: DISCONTINUED | OUTPATIENT
Start: 2022-01-01 | End: 2022-01-01

## 2022-01-01 RX ORDER — HEPARIN SODIUM 5000 [USP'U]/ML
1100 INJECTION INTRAVENOUS; SUBCUTANEOUS
Qty: 25000 | Refills: 0 | Status: DISCONTINUED | OUTPATIENT
Start: 2022-01-01 | End: 2022-01-01

## 2022-01-01 RX ORDER — LEVOTHYROXINE SODIUM 0.05 MG/1
50 TABLET ORAL DAILY
Qty: 30 | Refills: 11 | Status: ACTIVE | COMMUNITY
Start: 2022-01-01 | End: 1900-01-01

## 2022-01-01 RX ORDER — HYDRALAZINE HCL 50 MG
25 TABLET ORAL ONCE
Refills: 0 | Status: COMPLETED | OUTPATIENT
Start: 2022-01-01 | End: 2022-01-01

## 2022-01-01 RX ORDER — SUCRALFATE 1 G
1 TABLET ORAL
Qty: 60 | Refills: 0
Start: 2022-01-01 | End: 2022-01-01

## 2022-01-01 RX ORDER — HYDROMORPHONE HYDROCHLORIDE 2 MG/ML
0.4 INJECTION INTRAMUSCULAR; INTRAVENOUS; SUBCUTANEOUS
Refills: 0 | Status: DISCONTINUED | OUTPATIENT
Start: 2022-01-01 | End: 2022-01-01

## 2022-01-01 RX ORDER — FOSPHENYTOIN 50 MG/ML
75 INJECTION INTRAMUSCULAR; INTRAVENOUS
Refills: 0 | Status: DISCONTINUED | OUTPATIENT
Start: 2022-01-01 | End: 2022-01-01

## 2022-01-01 RX ORDER — MYCOPHENOLATE MOFETIL 250 MG/1
1 CAPSULE ORAL
Qty: 0 | Refills: 0 | DISCHARGE
Start: 2022-01-01

## 2022-01-01 RX ORDER — TRAMADOL HYDROCHLORIDE 50 MG/1
25 TABLET ORAL EVERY 4 HOURS
Refills: 0 | Status: DISCONTINUED | OUTPATIENT
Start: 2022-01-01 | End: 2022-01-01

## 2022-01-01 RX ORDER — VALGANCICLOVIR 450 MG/1
1 TABLET, FILM COATED ORAL
Qty: 13 | Refills: 0
Start: 2022-01-01 | End: 2022-01-01

## 2022-01-01 RX ORDER — LANOLIN ALCOHOL/MO/W.PET/CERES
3 CREAM (GRAM) TOPICAL AT BEDTIME
Refills: 0 | Status: DISCONTINUED | OUTPATIENT
Start: 2022-01-01 | End: 2022-01-01

## 2022-01-01 RX ORDER — INSULIN GLARGINE 100 [IU]/ML
18 INJECTION, SOLUTION SUBCUTANEOUS AT BEDTIME
Refills: 0 | Status: DISCONTINUED | OUTPATIENT
Start: 2022-01-01 | End: 2022-01-01

## 2022-01-01 RX ORDER — HYDRALAZINE HCL 50 MG
50 TABLET ORAL THREE TIMES A DAY
Refills: 0 | Status: DISCONTINUED | OUTPATIENT
Start: 2022-01-01 | End: 2022-01-01

## 2022-01-01 RX ORDER — DEXMEDETOMIDINE HYDROCHLORIDE IN 0.9% SODIUM CHLORIDE 4 UG/ML
0.1 INJECTION INTRAVENOUS
Qty: 200 | Refills: 0 | Status: DISCONTINUED | OUTPATIENT
Start: 2022-01-01 | End: 2022-01-01

## 2022-01-01 RX ORDER — POLYETHYLENE GLYCOL 3350 17 G/17G
17 POWDER, FOR SOLUTION ORAL
Qty: 10 | Refills: 0
Start: 2022-01-01 | End: 2022-01-01

## 2022-01-01 RX ORDER — TACROLIMUS 5 MG/1
2 CAPSULE ORAL
Refills: 0 | Status: DISCONTINUED | OUTPATIENT
Start: 2022-01-01 | End: 2022-01-01

## 2022-01-01 RX ORDER — SOD SULF/SODIUM/NAHCO3/KCL/PEG
1000 SOLUTION, RECONSTITUTED, ORAL ORAL ONCE
Refills: 0 | Status: COMPLETED | OUTPATIENT
Start: 2022-01-01 | End: 2022-01-01

## 2022-01-01 RX ORDER — DIVALPROEX SODIUM 250 MG/1
250 TABLET, DELAYED RELEASE ORAL
Qty: 30 | Refills: 0 | Status: DISCONTINUED | COMMUNITY
Start: 2022-01-01 | End: 2022-01-01

## 2022-01-01 RX ORDER — TACROLIMUS 1 MG/1
1 TABLET, EXTENDED RELEASE ORAL
Qty: 60 | Refills: 11 | Status: DISCONTINUED | COMMUNITY
Start: 2022-01-01 | End: 2022-01-01

## 2022-01-01 RX ORDER — MEROPENEM 1 G/30ML
INJECTION INTRAVENOUS
Refills: 0 | Status: DISCONTINUED | OUTPATIENT
Start: 2022-01-01 | End: 2022-01-01

## 2022-01-01 RX ORDER — FUROSEMIDE 40 MG
2 TABLET ORAL
Qty: 120 | Refills: 0
Start: 2022-01-01 | End: 2022-01-01

## 2022-01-01 RX ORDER — INSULIN LISPRO 100/ML
4 VIAL (ML) SUBCUTANEOUS
Qty: 0 | Refills: 0 | DISCHARGE
Start: 2022-01-01

## 2022-01-01 RX ORDER — INSULIN GLARGINE 100 [IU]/ML
32 INJECTION, SOLUTION SUBCUTANEOUS ONCE
Refills: 0 | Status: COMPLETED | OUTPATIENT
Start: 2022-01-01 | End: 2022-01-01

## 2022-01-01 RX ORDER — TELMISARTAN 20 MG/1
1 TABLET ORAL
Qty: 0 | Refills: 0 | DISCHARGE

## 2022-01-01 RX ORDER — POLYETHYLENE GLYCOL 3350 17 G/17G
17 POWDER, FOR SOLUTION ORAL ONCE
Refills: 0 | Status: DISCONTINUED | OUTPATIENT
Start: 2022-01-01 | End: 2022-01-01

## 2022-01-01 RX ORDER — SODIUM BICARBONATE 1 MEQ/ML
1300 SYRINGE (ML) INTRAVENOUS
Refills: 0 | Status: DISCONTINUED | OUTPATIENT
Start: 2022-01-01 | End: 2022-01-01

## 2022-01-01 RX ORDER — BRIVARACETAM 25 MG/1
75 TABLET, FILM COATED ORAL ONCE
Refills: 0 | Status: DISCONTINUED | OUTPATIENT
Start: 2022-01-01 | End: 2022-01-01

## 2022-01-01 RX ORDER — FOSPHENYTOIN 50 MG/ML
100 INJECTION INTRAMUSCULAR; INTRAVENOUS EVERY 12 HOURS
Refills: 0 | Status: DISCONTINUED | OUTPATIENT
Start: 2022-01-01 | End: 2022-01-01

## 2022-01-01 RX ORDER — LEVOTHYROXINE SODIUM 125 MCG
25 TABLET ORAL AT BEDTIME
Refills: 0 | Status: DISCONTINUED | OUTPATIENT
Start: 2022-01-01 | End: 2022-01-01

## 2022-01-01 RX ORDER — INFLUENZA VIRUS VACCINE 15; 15; 15; 15 UG/.5ML; UG/.5ML; UG/.5ML; UG/.5ML
0.7 SUSPENSION INTRAMUSCULAR ONCE
Refills: 0 | Status: DISCONTINUED | OUTPATIENT
Start: 2022-01-01 | End: 2022-01-01

## 2022-01-01 RX ORDER — HYDRALAZINE HYDROCHLORIDE 10 MG/1
10 TABLET ORAL
Qty: 360 | Refills: 3 | Status: DISCONTINUED | COMMUNITY
Start: 2021-11-04 | End: 2022-01-01

## 2022-01-01 RX ORDER — SODIUM BICARBONATE 1 MEQ/ML
1300 SYRINGE (ML) INTRAVENOUS EVERY 12 HOURS
Refills: 0 | Status: DISCONTINUED | OUTPATIENT
Start: 2022-01-01 | End: 2022-01-01

## 2022-01-01 RX ORDER — HYDRALAZINE HCL 50 MG
75 TABLET ORAL EVERY 8 HOURS
Refills: 0 | Status: DISCONTINUED | OUTPATIENT
Start: 2022-01-01 | End: 2022-01-01

## 2022-01-01 RX ORDER — SENNOSIDES 8.6 MG/1
8.6 CAPSULE, GELATIN COATED ORAL
Qty: 30 | Refills: 1 | Status: DISCONTINUED | COMMUNITY
Start: 2022-01-01 | End: 2022-01-01

## 2022-01-01 RX ORDER — LEVETIRACETAM 250 MG/1
250 TABLET, FILM COATED ORAL
Refills: 0 | Status: DISCONTINUED | OUTPATIENT
Start: 2022-01-01 | End: 2022-01-01

## 2022-01-01 RX ORDER — PETROLATUM,WHITE
1 JELLY (GRAM) TOPICAL
Refills: 0 | Status: COMPLETED | OUTPATIENT
Start: 2022-01-01 | End: 2022-01-01

## 2022-01-01 RX ORDER — LEVETIRACETAM 250 MG/1
1000 TABLET, FILM COATED ORAL ONCE
Refills: 0 | Status: COMPLETED | OUTPATIENT
Start: 2022-01-01 | End: 2022-01-01

## 2022-01-01 RX ORDER — FINASTERIDE 5 MG/1
5 TABLET, FILM COATED ORAL
Refills: 0 | Status: ACTIVE | COMMUNITY

## 2022-01-01 RX ORDER — TACROLIMUS 5 MG/1
2 CAPSULE ORAL EVERY 12 HOURS
Refills: 0 | Status: DISCONTINUED | OUTPATIENT
Start: 2022-01-01 | End: 2022-01-01

## 2022-01-01 RX ORDER — ERTAPENEM SODIUM 1 G/1
500 INJECTION, POWDER, LYOPHILIZED, FOR SOLUTION INTRAMUSCULAR; INTRAVENOUS EVERY 24 HOURS
Refills: 0 | Status: DISCONTINUED | OUTPATIENT
Start: 2022-01-01 | End: 2022-01-01

## 2022-01-01 RX ORDER — SULFAMETHOXAZOLE AND TRIMETHOPRIM 400; 80 MG/1; MG/1
400-80 TABLET ORAL
Qty: 30 | Refills: 6 | Status: DISCONTINUED | COMMUNITY
Start: 2022-01-01 | End: 2022-01-01

## 2022-01-01 RX ORDER — ACETAMINOPHEN 500 MG
650 TABLET ORAL ONCE
Refills: 0 | Status: DISCONTINUED | OUTPATIENT
Start: 2022-01-01 | End: 2022-01-01

## 2022-01-01 RX ORDER — APIXABAN 2.5 MG/1
5 TABLET, FILM COATED ORAL
Refills: 0 | Status: DISCONTINUED | OUTPATIENT
Start: 2022-01-01 | End: 2022-01-01

## 2022-01-01 RX ORDER — LANOLIN ALCOHOL/MO/W.PET/CERES
3 CREAM (GRAM) TOPICAL ONCE
Refills: 0 | Status: COMPLETED | OUTPATIENT
Start: 2022-01-01 | End: 2022-01-01

## 2022-01-01 RX ORDER — BRIVARACETAM 25 MG/1
25 TABLET, FILM COATED ORAL ONCE
Refills: 0 | Status: DISCONTINUED | OUTPATIENT
Start: 2022-01-01 | End: 2022-01-01

## 2022-01-01 RX ORDER — LEVETIRACETAM 250 MG/1
250 TABLET, FILM COATED ORAL ONCE
Refills: 0 | Status: COMPLETED | OUTPATIENT
Start: 2022-01-01 | End: 2022-01-01

## 2022-01-01 RX ORDER — SIROLIMUS 1 MG/1
1 TABLET, FILM COATED ORAL DAILY
Qty: 210 | Refills: 0 | Status: ACTIVE | COMMUNITY
Start: 2022-01-01 | End: 1900-01-01

## 2022-01-01 RX ORDER — SIROLIMUS 1 MG/ML
1 SOLUTION ORAL
Refills: 0 | Status: DISCONTINUED | OUTPATIENT
Start: 2022-01-01 | End: 2022-01-01

## 2022-01-01 RX ORDER — ENOXAPARIN SODIUM 100 MG/ML
60 INJECTION SUBCUTANEOUS EVERY 24 HOURS
Refills: 0 | Status: DISCONTINUED | OUTPATIENT
Start: 2022-01-01 | End: 2022-01-01

## 2022-01-01 RX ORDER — VALPROIC ACID (AS SODIUM SALT) 250 MG/5ML
750 SOLUTION, ORAL ORAL EVERY 8 HOURS
Refills: 0 | Status: DISCONTINUED | OUTPATIENT
Start: 2022-01-01 | End: 2022-01-01

## 2022-01-01 RX ORDER — VANCOMYCIN HCL 1 G
750 VIAL (EA) INTRAVENOUS ONCE
Refills: 0 | Status: COMPLETED | OUTPATIENT
Start: 2022-01-01 | End: 2022-01-01

## 2022-01-01 RX ORDER — INSULIN LISPRO 100/ML
10 VIAL (ML) SUBCUTANEOUS ONCE
Refills: 0 | Status: COMPLETED | OUTPATIENT
Start: 2022-01-01 | End: 2022-01-01

## 2022-01-01 RX ORDER — HYDRALAZINE HCL 50 MG
50 TABLET ORAL EVERY 8 HOURS
Refills: 0 | Status: DISCONTINUED | OUTPATIENT
Start: 2022-01-01 | End: 2022-01-01

## 2022-01-01 RX ORDER — PROPOFOL 10 MG/ML
10 INJECTION, EMULSION INTRAVENOUS
Qty: 1000 | Refills: 0 | Status: DISCONTINUED | OUTPATIENT
Start: 2022-01-01 | End: 2022-01-01

## 2022-01-01 RX ORDER — LEVOTHYROXINE SODIUM 125 MCG
1 TABLET ORAL
Qty: 30 | Refills: 0
Start: 2022-01-01 | End: 2022-01-01

## 2022-01-01 RX ORDER — HALOPERIDOL DECANOATE 100 MG/ML
2 INJECTION INTRAMUSCULAR ONCE
Refills: 0 | Status: COMPLETED | OUTPATIENT
Start: 2022-01-01 | End: 2022-01-01

## 2022-01-01 RX ORDER — INSULIN GLARGINE 100 [IU]/ML
8 INJECTION, SOLUTION SUBCUTANEOUS AT BEDTIME
Refills: 0 | Status: DISCONTINUED | OUTPATIENT
Start: 2022-01-01 | End: 2022-01-01

## 2022-01-01 RX ORDER — FUROSEMIDE 80 MG/1
80 TABLET ORAL DAILY
Qty: 30 | Refills: 4 | Status: ACTIVE | COMMUNITY
Start: 2022-01-01 | End: 1900-01-01

## 2022-01-01 RX ORDER — POLYETHYLENE GLYCOL 3350 17 G/17G
17 POWDER, FOR SOLUTION ORAL EVERY 24 HOURS
Refills: 0 | Status: DISCONTINUED | OUTPATIENT
Start: 2022-01-01 | End: 2022-01-01

## 2022-01-01 RX ORDER — BRIVARACETAM 50 MG/1
50 TABLET, FILM COATED ORAL TWICE DAILY
Qty: 60 | Refills: 4 | Status: ACTIVE | COMMUNITY
Start: 2022-01-01 | End: 1900-01-01

## 2022-01-01 RX ORDER — INSULIN GLARGINE 100 [IU]/ML
32 INJECTION, SOLUTION SUBCUTANEOUS AT BEDTIME
Refills: 0 | Status: DISCONTINUED | OUTPATIENT
Start: 2022-01-01 | End: 2022-01-01

## 2022-01-01 RX ORDER — WARFARIN SODIUM 2.5 MG/1
1 TABLET ORAL
Qty: 0 | Refills: 0 | DISCHARGE
Start: 2022-01-01

## 2022-01-01 RX ORDER — CARVEDILOL PHOSPHATE 80 MG/1
3.12 CAPSULE, EXTENDED RELEASE ORAL
Refills: 0 | Status: DISCONTINUED | OUTPATIENT
Start: 2022-01-01 | End: 2022-01-01

## 2022-01-01 RX ORDER — ACETAMINOPHEN 500 MG
1000 TABLET ORAL EVERY 6 HOURS
Refills: 0 | Status: DISCONTINUED | OUTPATIENT
Start: 2022-01-01 | End: 2022-01-01

## 2022-01-01 RX ORDER — LEVETIRACETAM 250 MG/1
250 TABLET, FILM COATED ORAL
Refills: 0 | Status: DISCONTINUED | COMMUNITY
End: 2022-01-01

## 2022-01-01 RX ORDER — INSULIN LISPRO 100/ML
9 VIAL (ML) SUBCUTANEOUS
Qty: 0 | Refills: 0 | DISCHARGE
Start: 2022-01-01

## 2022-01-01 RX ORDER — ENOXAPARIN SODIUM 100 MG/ML
60 INJECTION SUBCUTANEOUS
Refills: 0 | Status: DISCONTINUED | OUTPATIENT
Start: 2022-01-01 | End: 2022-01-01

## 2022-01-01 RX ORDER — ATORVASTATIN CALCIUM 40 MG/1
40 TABLET, FILM COATED ORAL
Qty: 30 | Refills: 3 | Status: ACTIVE | COMMUNITY
Start: 2022-01-01

## 2022-01-01 RX ORDER — AMLODIPINE BESYLATE 2.5 MG/1
1 TABLET ORAL
Qty: 0 | Refills: 0 | DISCHARGE
Start: 2022-01-01

## 2022-01-01 RX ORDER — DIPHENHYDRAMINE HCL 50 MG
25 CAPSULE ORAL AT BEDTIME
Refills: 0 | Status: DISCONTINUED | OUTPATIENT
Start: 2022-01-01 | End: 2022-01-01

## 2022-01-01 RX ORDER — POLYETHYLENE GLYCOL 3350 17 G/17G
17 POWDER, FOR SOLUTION ORAL
Qty: 0 | Refills: 0 | DISCHARGE
Start: 2022-01-01

## 2022-01-01 RX ORDER — PANTOPRAZOLE SODIUM 20 MG/1
1 TABLET, DELAYED RELEASE ORAL
Qty: 30 | Refills: 0
Start: 2022-01-01 | End: 2022-01-01

## 2022-01-01 RX ORDER — ERYTHROPOIETIN 10000 [IU]/ML
14000 INJECTION, SOLUTION INTRAVENOUS; SUBCUTANEOUS
Refills: 0 | Status: DISCONTINUED | OUTPATIENT
Start: 2022-01-01 | End: 2022-01-01

## 2022-01-01 RX ORDER — BENZOCAINE 10 %
1 GEL (GRAM) MUCOUS MEMBRANE ONCE
Refills: 0 | Status: COMPLETED | OUTPATIENT
Start: 2022-01-01 | End: 2022-01-01

## 2022-01-01 RX ORDER — CEFEPIME 1 G/1
1000 INJECTION, POWDER, FOR SOLUTION INTRAMUSCULAR; INTRAVENOUS ONCE
Refills: 0 | Status: COMPLETED | OUTPATIENT
Start: 2022-01-01 | End: 2022-01-01

## 2022-01-01 RX ORDER — MAGNESIUM OXIDE 400 MG ORAL TABLET 241.3 MG
400 TABLET ORAL ONCE
Refills: 0 | Status: COMPLETED | OUTPATIENT
Start: 2022-01-01 | End: 2022-01-01

## 2022-01-01 RX ORDER — TACROLIMUS 5 MG/1
3 CAPSULE ORAL
Qty: 0 | Refills: 0 | DISCHARGE

## 2022-01-01 RX ORDER — FAMOTIDINE 10 MG/ML
20 INJECTION INTRAVENOUS DAILY
Refills: 0 | Status: DISCONTINUED | OUTPATIENT
Start: 2022-01-01 | End: 2022-01-01

## 2022-01-01 RX ORDER — OXYCODONE HYDROCHLORIDE 5 MG/1
5 TABLET ORAL ONCE
Refills: 0 | Status: DISCONTINUED | OUTPATIENT
Start: 2022-01-01 | End: 2022-01-01

## 2022-01-01 RX ORDER — HEPARIN SODIUM 5000 [USP'U]/ML
800 INJECTION INTRAVENOUS; SUBCUTANEOUS
Qty: 25000 | Refills: 0 | Status: DISCONTINUED | OUTPATIENT
Start: 2022-01-01 | End: 2022-01-01

## 2022-01-01 RX ORDER — TACROLIMUS 5 MG/1
3 CAPSULE ORAL ONCE
Refills: 0 | Status: COMPLETED | OUTPATIENT
Start: 2022-01-01 | End: 2022-01-01

## 2022-01-01 RX ORDER — HEPARIN SODIUM 5000 [USP'U]/ML
1000 INJECTION INTRAVENOUS; SUBCUTANEOUS
Qty: 25000 | Refills: 0 | Status: DISCONTINUED | OUTPATIENT
Start: 2022-01-01 | End: 2022-01-01

## 2022-01-01 RX ORDER — SENNA PLUS 8.6 MG/1
10 TABLET ORAL DAILY
Refills: 0 | Status: DISCONTINUED | OUTPATIENT
Start: 2022-01-01 | End: 2022-01-01

## 2022-01-01 RX ORDER — HEXAVITAMINS
1 TABLET ORAL
Qty: 0 | Refills: 0 | DISCHARGE

## 2022-01-01 RX ORDER — CEPHALEXIN 500 MG/1
500 CAPSULE ORAL
Qty: 10 | Refills: 0 | Status: DISCONTINUED | COMMUNITY
Start: 2022-01-01 | End: 2022-01-01

## 2022-01-01 RX ORDER — SENNOSIDES 8.6 MG/1
8.6 CAPSULE, GELATIN COATED ORAL
Qty: 180 | Refills: 3 | Status: ACTIVE | COMMUNITY
Start: 2022-01-01 | End: 1900-01-01

## 2022-01-01 RX ORDER — VALPROIC ACID (AS SODIUM SALT) 250 MG/5ML
1000 SOLUTION, ORAL ORAL EVERY 8 HOURS
Refills: 0 | Status: DISCONTINUED | OUTPATIENT
Start: 2022-01-01 | End: 2022-01-01

## 2022-01-01 RX ORDER — INSULIN GLARGINE 100 [IU]/ML
16 INJECTION, SOLUTION SUBCUTANEOUS AT BEDTIME
Refills: 0 | Status: DISCONTINUED | OUTPATIENT
Start: 2022-01-01 | End: 2022-01-01

## 2022-01-01 RX ORDER — SIROLIMUS 1 MG/ML
7 SOLUTION ORAL
Qty: 210 | Refills: 0
Start: 2022-01-01 | End: 2022-01-01

## 2022-01-01 RX ORDER — MAGNESIUM OXIDE 400 MG ORAL TABLET 241.3 MG
1 TABLET ORAL
Qty: 0 | Refills: 0 | DISCHARGE
Start: 2022-01-01

## 2022-01-01 RX ORDER — WARFARIN SODIUM 2.5 MG/1
1 TABLET ORAL
Qty: 10 | Refills: 0
Start: 2022-01-01 | End: 2022-01-01

## 2022-01-01 RX ORDER — WARFARIN SODIUM 2.5 MG/1
1 TABLET ORAL
Qty: 30 | Refills: 0
Start: 2022-01-01 | End: 2022-01-01

## 2022-01-01 RX ORDER — CALCIUM GLUCONATE 100 MG/ML
2 VIAL (ML) INTRAVENOUS ONCE
Refills: 0 | Status: DISCONTINUED | OUTPATIENT
Start: 2022-01-01 | End: 2022-01-01

## 2022-01-01 RX ORDER — NIFEDIPINE 30 MG
30 TABLET, EXTENDED RELEASE 24 HR ORAL ONCE
Refills: 0 | Status: COMPLETED | OUTPATIENT
Start: 2022-01-01 | End: 2022-01-01

## 2022-01-01 RX ORDER — BRIVARACETAM 25 MG/1
1 TABLET, FILM COATED ORAL
Qty: 60 | Refills: 0
Start: 2022-01-01 | End: 2022-01-01

## 2022-01-01 RX ORDER — LEVOTHYROXINE SODIUM 125 MCG
37.5 TABLET ORAL ONCE
Refills: 0 | Status: DISCONTINUED | OUTPATIENT
Start: 2022-01-01 | End: 2022-01-01

## 2022-01-01 RX ORDER — ACETAMINOPHEN 500 MG
975 TABLET ORAL EVERY 6 HOURS
Refills: 0 | Status: DISCONTINUED | OUTPATIENT
Start: 2022-01-01 | End: 2022-01-01

## 2022-01-01 RX ORDER — ATORVASTATIN CALCIUM 80 MG/1
80 TABLET, FILM COATED ORAL DAILY
Qty: 90 | Refills: 1 | Status: DISCONTINUED | COMMUNITY
Start: 2018-05-09 | End: 2022-01-01

## 2022-01-01 RX ORDER — WARFARIN SODIUM 2.5 MG/1
1 TABLET ORAL
Qty: 14 | Refills: 0
Start: 2022-01-01 | End: 2022-01-01

## 2022-01-01 RX ORDER — FOSPHENYTOIN 50 MG/ML
200 INJECTION INTRAMUSCULAR; INTRAVENOUS EVERY 12 HOURS
Refills: 0 | Status: DISCONTINUED | OUTPATIENT
Start: 2022-01-01 | End: 2022-01-01

## 2022-01-01 RX ORDER — DOXAZOSIN MESYLATE 4 MG
4 TABLET ORAL DAILY
Refills: 0 | Status: DISCONTINUED | OUTPATIENT
Start: 2022-01-01 | End: 2022-01-01

## 2022-01-01 RX ORDER — PROPOFOL 10 MG/ML
40 INJECTION, EMULSION INTRAVENOUS ONCE
Refills: 0 | Status: DISCONTINUED | OUTPATIENT
Start: 2022-01-01 | End: 2022-01-01

## 2022-01-01 RX ORDER — INSULIN LISPRO 100/ML
7 VIAL (ML) SUBCUTANEOUS
Qty: 0 | Refills: 0 | DISCHARGE
Start: 2022-01-01

## 2022-01-01 RX ORDER — AMLODIPINE BESYLATE 2.5 MG/1
1 TABLET ORAL
Qty: 30 | Refills: 0
Start: 2022-01-01 | End: 2022-01-01

## 2022-01-01 RX ORDER — CARVEDILOL 12.5 MG/1
12.5 TABLET, FILM COATED ORAL TWICE DAILY
Qty: 60 | Refills: 4 | Status: ACTIVE | COMMUNITY
Start: 2022-01-01 | End: 1900-01-01

## 2022-01-01 RX ORDER — DIVALPROEX SODIUM 500 MG/1
500 TABLET, DELAYED RELEASE ORAL ONCE
Refills: 0 | Status: DISCONTINUED | OUTPATIENT
Start: 2022-01-01 | End: 2022-01-01

## 2022-01-01 RX ORDER — FLUCONAZOLE 150 MG/1
400 TABLET ORAL DAILY
Refills: 0 | Status: DISCONTINUED | OUTPATIENT
Start: 2022-01-01 | End: 2022-01-01

## 2022-01-01 RX ORDER — MYCOPHENOLATE MOFETIL 250 MG/1
1 CAPSULE ORAL
Refills: 0 | Status: DISCONTINUED | OUTPATIENT
Start: 2022-01-01 | End: 2022-01-01

## 2022-01-01 RX ORDER — SIROLIMUS 1 MG/ML
7 SOLUTION ORAL DAILY
Refills: 0 | Status: DISCONTINUED | OUTPATIENT
Start: 2022-01-01 | End: 2022-01-01

## 2022-01-01 RX ORDER — PERAMPANEL 2 MG/1
6 TABLET ORAL ONCE
Refills: 0 | Status: DISCONTINUED | OUTPATIENT
Start: 2022-01-01 | End: 2022-01-01

## 2022-01-01 RX ORDER — DIVALPROEX SODIUM 500 MG/1
250 TABLET, DELAYED RELEASE ORAL
Refills: 0 | Status: DISCONTINUED | OUTPATIENT
Start: 2022-01-01 | End: 2022-01-01

## 2022-01-01 RX ORDER — TACROLIMUS 5 MG/1
2 CAPSULE ORAL ONCE
Refills: 0 | Status: COMPLETED | OUTPATIENT
Start: 2022-01-01 | End: 2022-01-01

## 2022-01-01 RX ORDER — HYDRALAZINE HCL 50 MG
5 TABLET ORAL
Refills: 0 | Status: COMPLETED | OUTPATIENT
Start: 2022-01-01 | End: 2022-01-01

## 2022-01-01 RX ORDER — INSULIN GLARGINE 100 [IU]/ML
9 INJECTION, SOLUTION SUBCUTANEOUS AT BEDTIME
Refills: 0 | Status: DISCONTINUED | OUTPATIENT
Start: 2022-01-01 | End: 2022-01-01

## 2022-01-01 RX ORDER — OXYCODONE HYDROCHLORIDE 5 MG/1
5 TABLET ORAL EVERY 4 HOURS
Refills: 0 | Status: DISCONTINUED | OUTPATIENT
Start: 2022-01-01 | End: 2022-01-01

## 2022-01-01 RX ORDER — ALBUTEROL 90 UG/1
2.5 AEROSOL, METERED ORAL
Refills: 0 | Status: COMPLETED | OUTPATIENT
Start: 2022-01-01 | End: 2022-01-01

## 2022-01-01 RX ORDER — TACROLIMUS 5 MG/1
4 CAPSULE ORAL
Qty: 0 | Refills: 0 | DISCHARGE
Start: 2022-01-01

## 2022-01-01 RX ORDER — GLYCERIN ADULT
1 SUPPOSITORY, RECTAL RECTAL ONCE
Refills: 0 | Status: COMPLETED | OUTPATIENT
Start: 2022-01-01 | End: 2022-01-01

## 2022-01-01 RX ORDER — PHENYTOIN SODIUM 200 MG/1
200 CAPSULE, EXTENDED RELEASE ORAL TWICE DAILY
Qty: 60 | Refills: 0 | Status: ACTIVE | COMMUNITY
Start: 2022-01-01 | End: 1900-01-01

## 2022-01-01 RX ORDER — INSULIN GLARGINE 100 [IU]/ML
14 INJECTION, SOLUTION SUBCUTANEOUS
Refills: 0 | Status: DISCONTINUED | OUTPATIENT
Start: 2022-01-01 | End: 2022-01-01

## 2022-01-01 RX ORDER — CARVEDILOL PHOSPHATE 80 MG/1
1 CAPSULE, EXTENDED RELEASE ORAL
Qty: 60 | Refills: 0
Start: 2022-01-01 | End: 2022-01-01

## 2022-01-01 RX ORDER — SODIUM BICARBONATE 1 MEQ/ML
50 SYRINGE (ML) INTRAVENOUS ONCE
Refills: 0 | Status: DISCONTINUED | OUTPATIENT
Start: 2022-01-01 | End: 2022-01-01

## 2022-01-01 RX ORDER — HUMAN INSULIN 100 [IU]/ML
8 INJECTION, SUSPENSION SUBCUTANEOUS ONCE
Refills: 0 | Status: COMPLETED | OUTPATIENT
Start: 2022-01-01 | End: 2022-01-01

## 2022-01-01 RX ORDER — AMLODIPINE BESYLATE 10 MG/1
10 TABLET ORAL DAILY
Qty: 30 | Refills: 1 | Status: COMPLETED | COMMUNITY
Start: 2022-01-01 | End: 2022-01-01

## 2022-01-01 RX ORDER — INSULIN LISPRO 100 [IU]/ML
100 INJECTION, SOLUTION INTRAVENOUS; SUBCUTANEOUS
Qty: 3 | Refills: 1 | Status: ACTIVE | COMMUNITY
Start: 2022-01-01

## 2022-01-01 RX ORDER — BRIVARACETAM 25 MG/1
1 TABLET, FILM COATED ORAL
Qty: 0 | Refills: 0 | DISCHARGE
Start: 2022-01-01

## 2022-01-01 RX ORDER — SODIUM BICARBONATE 1 MEQ/ML
2 SYRINGE (ML) INTRAVENOUS
Qty: 120 | Refills: 0
Start: 2022-01-01 | End: 2022-01-01

## 2022-01-01 RX ORDER — DESMOPRESSIN ACETATE 0.1 MG/1
20 TABLET ORAL ONCE
Refills: 0 | Status: COMPLETED | OUTPATIENT
Start: 2022-01-01 | End: 2022-01-01

## 2022-01-01 RX ORDER — SUCCINYLCHOLINE CHLORIDE 100 MG/5ML
100 SYRINGE (ML) INTRAVENOUS ONCE
Refills: 0 | Status: COMPLETED | OUTPATIENT
Start: 2022-01-01 | End: 2022-01-01

## 2022-01-01 RX ORDER — TACROLIMUS 5 MG/1
2 CAPSULE ORAL
Qty: 0 | Refills: 0 | DISCHARGE
Start: 2022-01-01

## 2022-01-01 RX ORDER — SODIUM CHLORIDE 9 MG/ML
1 INJECTION INTRAMUSCULAR; INTRAVENOUS; SUBCUTANEOUS EVERY 8 HOURS
Refills: 0 | Status: DISCONTINUED | OUTPATIENT
Start: 2022-01-01 | End: 2022-01-01

## 2022-01-01 RX ORDER — POTASSIUM PHOSPHATE, MONOBASIC POTASSIUM PHOSPHATE, DIBASIC 236; 224 MG/ML; MG/ML
15 INJECTION, SOLUTION INTRAVENOUS ONCE
Refills: 0 | Status: COMPLETED | OUTPATIENT
Start: 2022-01-01 | End: 2022-01-01

## 2022-01-01 RX ORDER — HYDRALAZINE HCL 50 MG
10 TABLET ORAL EVERY 8 HOURS
Refills: 0 | Status: DISCONTINUED | OUTPATIENT
Start: 2022-01-01 | End: 2022-01-01

## 2022-01-01 RX ORDER — INSULIN GLARGINE 100 [IU]/ML
4 INJECTION, SOLUTION SUBCUTANEOUS
Refills: 0 | Status: DISCONTINUED | OUTPATIENT
Start: 2022-01-01 | End: 2022-01-01

## 2022-01-01 RX ORDER — LEVETIRACETAM 250 MG/1
1 TABLET, FILM COATED ORAL
Qty: 0 | Refills: 0 | DISCHARGE
Start: 2022-01-01

## 2022-01-01 RX ORDER — LACTULOSE 10 G/15ML
10 SOLUTION ORAL THREE TIMES A DAY
Refills: 0 | Status: DISCONTINUED | OUTPATIENT
Start: 2022-01-01 | End: 2022-01-01

## 2022-01-01 RX ORDER — SULFAMETHOXAZOLE AND TRIMETHOPRIM 400; 80 MG/1; MG/1
400-80 TABLET ORAL DAILY
Qty: 30 | Refills: 2 | Status: ACTIVE | COMMUNITY
Start: 2022-01-01 | End: 1900-01-01

## 2022-01-01 RX ORDER — MYCOPHENOLATE MOFETIL 250 MG/1
1 CAPSULE ORAL
Qty: 4 | Refills: 0
Start: 2022-01-01

## 2022-01-01 RX ORDER — BRIVARACETAM 25 MG/1
1 TABLET, FILM COATED ORAL
Qty: 72 | Refills: 0
Start: 2022-01-01

## 2022-01-01 RX ORDER — HEPARIN SODIUM 5000 [USP'U]/ML
2500 INJECTION INTRAVENOUS; SUBCUTANEOUS EVERY 6 HOURS
Refills: 0 | Status: DISCONTINUED | OUTPATIENT
Start: 2022-01-01 | End: 2022-01-01

## 2022-01-01 RX ORDER — IVERMECTIN 3 MG/1
12 TABLET ORAL DAILY
Refills: 0 | Status: DISCONTINUED | OUTPATIENT
Start: 2022-01-01 | End: 2022-01-01

## 2022-01-01 RX ORDER — MONTELUKAST 4 MG/1
1 TABLET, CHEWABLE ORAL
Qty: 0 | Refills: 0 | DISCHARGE
Start: 2022-01-01

## 2022-01-01 RX ORDER — FLUCONAZOLE 200 MG/1
200 TABLET ORAL DAILY
Qty: 30 | Refills: 11 | Status: DISCONTINUED | COMMUNITY
Start: 2022-01-01 | End: 2022-01-01

## 2022-01-01 RX ORDER — INSULIN LISPRO 100/ML
1 VIAL (ML) SUBCUTANEOUS
Refills: 0 | Status: DISCONTINUED | OUTPATIENT
Start: 2022-01-01 | End: 2022-01-01

## 2022-01-01 RX ORDER — FOSPHENYTOIN 50 MG/ML
100 INJECTION INTRAMUSCULAR; INTRAVENOUS ONCE
Refills: 0 | Status: DISCONTINUED | OUTPATIENT
Start: 2022-01-01 | End: 2022-01-01

## 2022-01-01 RX ORDER — HALOPERIDOL DECANOATE 100 MG/ML
2.5 INJECTION INTRAMUSCULAR ONCE
Refills: 0 | Status: COMPLETED | OUTPATIENT
Start: 2022-01-01 | End: 2022-01-01

## 2022-01-01 RX ORDER — ERYTHROPOIETIN 10000 [IU]/ML
0 INJECTION, SOLUTION INTRAVENOUS; SUBCUTANEOUS
Qty: 0 | Refills: 0 | DISCHARGE

## 2022-01-01 RX ORDER — LEVOTHYROXINE SODIUM 125 MCG
37.5 TABLET ORAL AT BEDTIME
Refills: 0 | Status: DISCONTINUED | OUTPATIENT
Start: 2022-01-01 | End: 2022-01-01

## 2022-01-01 RX ORDER — DIVALPROEX SODIUM 500 MG/1
250 TABLET, DELAYED RELEASE ORAL ONCE
Refills: 0 | Status: COMPLETED | OUTPATIENT
Start: 2022-01-01 | End: 2022-01-01

## 2022-01-01 RX ORDER — DESMOPRESSIN ACETATE 0.1 MG/1
20 TABLET ORAL ONCE
Refills: 0 | Status: DISCONTINUED | OUTPATIENT
Start: 2022-01-01 | End: 2022-01-01

## 2022-01-01 RX ORDER — SIROLIMUS 1 MG/ML
1 SOLUTION ORAL DAILY
Refills: 0 | Status: DISCONTINUED | OUTPATIENT
Start: 2022-01-01 | End: 2022-01-01

## 2022-01-01 RX ORDER — SODIUM BICARBONATE 1 MEQ/ML
0.24 SYRINGE (ML) INTRAVENOUS
Qty: 150 | Refills: 0 | Status: DISCONTINUED | OUTPATIENT
Start: 2022-01-01 | End: 2022-01-01

## 2022-01-01 RX ORDER — LACTULOSE 10 G/15ML
200 SOLUTION ORAL ONCE
Refills: 0 | Status: COMPLETED | OUTPATIENT
Start: 2022-01-01 | End: 2022-01-01

## 2022-01-01 RX ORDER — VALPROIC ACID (AS SODIUM SALT) 250 MG/5ML
2000 SOLUTION, ORAL ORAL ONCE
Refills: 0 | Status: COMPLETED | OUTPATIENT
Start: 2022-01-01 | End: 2022-01-01

## 2022-01-01 RX ORDER — LEVETIRACETAM 250 MG/1
250 TABLET, FILM COATED ORAL ONCE
Refills: 0 | Status: DISCONTINUED | OUTPATIENT
Start: 2022-01-01 | End: 2022-01-01

## 2022-01-01 RX ORDER — MYCOPHENOLATE MOFETIL 500 MG/1
500 TABLET ORAL
Qty: 120 | Refills: 5 | Status: ACTIVE | COMMUNITY
Start: 2022-01-01

## 2022-01-01 RX ORDER — FINASTERIDE 5 MG/1
5 TABLET, FILM COATED ORAL DAILY
Qty: 30 | Refills: 4 | Status: ACTIVE | COMMUNITY
Start: 2022-01-01 | End: 1900-01-01

## 2022-01-01 RX ORDER — INSULIN GLARGINE 100 [IU]/ML
5 INJECTION, SOLUTION SUBCUTANEOUS
Qty: 5 | Refills: 0
Start: 2022-01-01 | End: 2022-01-01

## 2022-01-01 RX ORDER — MAGNESIUM HYDROXIDE 400 MG/1
30 TABLET, CHEWABLE ORAL ONCE
Refills: 0 | Status: COMPLETED | OUTPATIENT
Start: 2022-01-01 | End: 2022-01-01

## 2022-01-01 RX ORDER — HALOPERIDOL DECANOATE 100 MG/ML
2.5 INJECTION INTRAMUSCULAR ONCE
Refills: 0 | Status: DISCONTINUED | OUTPATIENT
Start: 2022-01-01 | End: 2022-01-01

## 2022-01-01 RX ORDER — PIPERACILLIN AND TAZOBACTAM 4; .5 G/20ML; G/20ML
3.38 INJECTION, POWDER, LYOPHILIZED, FOR SOLUTION INTRAVENOUS EVERY 12 HOURS
Refills: 0 | Status: DISCONTINUED | OUTPATIENT
Start: 2022-01-01 | End: 2022-01-01

## 2022-01-01 RX ORDER — MYCOPHENOLATE MOFETIL 250 MG/1
500 CAPSULE ORAL EVERY 12 HOURS
Refills: 0 | Status: DISCONTINUED | OUTPATIENT
Start: 2022-01-01 | End: 2022-01-01

## 2022-01-01 RX ORDER — VALPROIC ACID (AS SODIUM SALT) 250 MG/5ML
1250 SOLUTION, ORAL ORAL ONCE
Refills: 0 | Status: COMPLETED | OUTPATIENT
Start: 2022-01-01 | End: 2022-01-01

## 2022-01-01 RX ORDER — SOD SULF/SODIUM/NAHCO3/KCL/PEG
SOLUTION, RECONSTITUTED, ORAL ORAL
Refills: 0 | Status: COMPLETED | OUTPATIENT
Start: 2022-01-01 | End: 2022-01-01

## 2022-01-01 RX ORDER — RAMELTEON 8 MG
1 TABLET ORAL
Qty: 30 | Refills: 0
Start: 2022-01-01 | End: 2022-01-01

## 2022-01-01 RX ORDER — FLUCONAZOLE 150 MG/1
1 TABLET ORAL
Qty: 0 | Refills: 0 | DISCHARGE
Start: 2022-01-01

## 2022-01-01 RX ORDER — TETANUS TOXOID, REDUCED DIPHTHERIA TOXOID AND ACELLULAR PERTUSSIS VACCINE, ADSORBED 5; 2.5; 8; 8; 2.5 [IU]/.5ML; [IU]/.5ML; UG/.5ML; UG/.5ML; UG/.5ML
0.5 SUSPENSION INTRAMUSCULAR ONCE
Refills: 0 | Status: COMPLETED | OUTPATIENT
Start: 2022-01-01 | End: 2022-01-01

## 2022-01-01 RX ORDER — FOSPHENYTOIN 50 MG/ML
50 INJECTION INTRAMUSCULAR; INTRAVENOUS
Refills: 0 | Status: DISCONTINUED | OUTPATIENT
Start: 2022-01-01 | End: 2022-01-01

## 2022-01-01 RX ORDER — ACETAMINOPHEN 500 MG
2 TABLET ORAL
Qty: 0 | Refills: 0 | DISCHARGE
Start: 2022-01-01

## 2022-01-01 RX ORDER — CEPHALEXIN 500 MG
1 CAPSULE ORAL
Qty: 10 | Refills: 0
Start: 2022-01-01 | End: 2022-01-01

## 2022-01-01 RX ORDER — PREDNISONE 5 MG/1
5 TABLET ORAL
Qty: 30 | Refills: 5 | Status: DISCONTINUED | COMMUNITY
Start: 2022-01-01 | End: 2022-01-01

## 2022-01-01 RX ORDER — LABETALOL HCL 100 MG
5 TABLET ORAL ONCE
Refills: 0 | Status: DISCONTINUED | OUTPATIENT
Start: 2022-01-01 | End: 2022-01-01

## 2022-01-01 RX ORDER — WARFARIN SODIUM 2.5 MG/1
10 TABLET ORAL ONCE
Refills: 0 | Status: DISCONTINUED | OUTPATIENT
Start: 2022-01-01 | End: 2022-01-01

## 2022-01-01 RX ORDER — HEPARIN SODIUM 5000 [USP'U]/ML
5000 INJECTION INTRAVENOUS; SUBCUTANEOUS EVERY 6 HOURS
Refills: 0 | Status: DISCONTINUED | OUTPATIENT
Start: 2022-01-01 | End: 2022-01-01

## 2022-01-01 RX ORDER — INSULIN LISPRO 100 [IU]/ML
100 INJECTION, SOLUTION INTRAVENOUS; SUBCUTANEOUS
Qty: 5 | Refills: 3 | Status: ACTIVE | COMMUNITY
Start: 2022-01-01 | End: 1900-01-01

## 2022-01-01 RX ORDER — DOXAZOSIN MESYLATE 4 MG
4 TABLET ORAL AT BEDTIME
Refills: 0 | Status: DISCONTINUED | OUTPATIENT
Start: 2022-01-01 | End: 2022-01-01

## 2022-01-01 RX ORDER — POTASSIUM CHLORIDE 20 MEQ
40 PACKET (EA) ORAL ONCE
Refills: 0 | Status: COMPLETED | OUTPATIENT
Start: 2022-01-01 | End: 2022-01-01

## 2022-01-01 RX ORDER — AMLODIPINE BESYLATE 10 MG/1
10 TABLET ORAL
Qty: 90 | Refills: 3 | Status: ACTIVE | COMMUNITY
Start: 2022-01-01 | End: 1900-01-01

## 2022-01-01 RX ADMIN — SODIUM ZIRCONIUM CYCLOSILICATE 10 GRAM(S): 10 POWDER, FOR SUSPENSION ORAL at 20:40

## 2022-01-01 RX ADMIN — Medication 50 MICROGRAM(S): at 06:17

## 2022-01-01 RX ADMIN — MAGNESIUM OXIDE 400 MG ORAL TABLET 400 MILLIGRAM(S): 241.3 TABLET ORAL at 06:08

## 2022-01-01 RX ADMIN — Medication 500000 UNIT(S): at 05:20

## 2022-01-01 RX ADMIN — AMLODIPINE BESYLATE 10 MILLIGRAM(S): 2.5 TABLET ORAL at 06:05

## 2022-01-01 RX ADMIN — BRIVARACETAM 50 MILLIGRAM(S): 25 TABLET, FILM COATED ORAL at 18:04

## 2022-01-01 RX ADMIN — DIVALPROEX SODIUM 500 MILLIGRAM(S): 500 TABLET, DELAYED RELEASE ORAL at 20:33

## 2022-01-01 RX ADMIN — Medication 30 MILLIGRAM(S): at 05:31

## 2022-01-01 RX ADMIN — TACROLIMUS 2 MILLIGRAM(S): 5 CAPSULE ORAL at 08:03

## 2022-01-01 RX ADMIN — Medication 10 MILLIGRAM(S): at 06:13

## 2022-01-01 RX ADMIN — ENOXAPARIN SODIUM 60 MILLIGRAM(S): 100 INJECTION SUBCUTANEOUS at 20:55

## 2022-01-01 RX ADMIN — SENNA PLUS 10 MILLILITER(S): 8.6 TABLET ORAL at 23:44

## 2022-01-01 RX ADMIN — Medication 5 UNIT(S): at 17:24

## 2022-01-01 RX ADMIN — SEVELAMER CARBONATE 800 MILLIGRAM(S): 2400 POWDER, FOR SUSPENSION ORAL at 13:20

## 2022-01-01 RX ADMIN — VALGANCICLOVIR 450 MILLIGRAM(S): 450 TABLET, FILM COATED ORAL at 13:12

## 2022-01-01 RX ADMIN — Medication 100 MILLIGRAM(S): at 05:19

## 2022-01-01 RX ADMIN — Medication 1 TABLET(S): at 12:22

## 2022-01-01 RX ADMIN — Medication 300 MICROGRAM(S): at 17:51

## 2022-01-01 RX ADMIN — Medication 2 DROP(S): at 17:42

## 2022-01-01 RX ADMIN — Medication 2: at 21:29

## 2022-01-01 RX ADMIN — CARVEDILOL PHOSPHATE 12.5 MILLIGRAM(S): 80 CAPSULE, EXTENDED RELEASE ORAL at 05:48

## 2022-01-01 RX ADMIN — Medication 500000 UNIT(S): at 18:12

## 2022-01-01 RX ADMIN — PANTOPRAZOLE SODIUM 40 MILLIGRAM(S): 20 TABLET, DELAYED RELEASE ORAL at 12:41

## 2022-01-01 RX ADMIN — Medication 2 DROP(S): at 12:35

## 2022-01-01 RX ADMIN — Medication 1 TABLET(S): at 12:28

## 2022-01-01 RX ADMIN — Medication 4 MILLIGRAM(S): at 18:07

## 2022-01-01 RX ADMIN — Medication 1 TABLET(S): at 12:07

## 2022-01-01 RX ADMIN — AMLODIPINE BESYLATE 10 MILLIGRAM(S): 2.5 TABLET ORAL at 09:12

## 2022-01-01 RX ADMIN — Medication 100 MILLIGRAM(S): at 05:29

## 2022-01-01 RX ADMIN — Medication 3 UNIT(S): at 08:15

## 2022-01-01 RX ADMIN — Medication 40 MILLIGRAM(S): at 21:26

## 2022-01-01 RX ADMIN — Medication 5 MILLIGRAM(S): at 05:58

## 2022-01-01 RX ADMIN — AMLODIPINE BESYLATE 5 MILLIGRAM(S): 2.5 TABLET ORAL at 11:57

## 2022-01-01 RX ADMIN — CARVEDILOL PHOSPHATE 12.5 MILLIGRAM(S): 80 CAPSULE, EXTENDED RELEASE ORAL at 17:12

## 2022-01-01 RX ADMIN — Medication 3 UNIT(S): at 09:22

## 2022-01-01 RX ADMIN — Medication 1 TABLET(S): at 13:20

## 2022-01-01 RX ADMIN — Medication 100 GRAM(S): at 14:57

## 2022-01-01 RX ADMIN — HEPARIN SODIUM 11 UNIT(S)/HR: 5000 INJECTION INTRAVENOUS; SUBCUTANEOUS at 00:13

## 2022-01-01 RX ADMIN — Medication 10 MILLIGRAM(S): at 05:40

## 2022-01-01 RX ADMIN — Medication 500000 UNIT(S): at 12:19

## 2022-01-01 RX ADMIN — LEVETIRACETAM 250 MILLIGRAM(S): 250 TABLET, FILM COATED ORAL at 05:35

## 2022-01-01 RX ADMIN — Medication 100 MILLIGRAM(S): at 05:51

## 2022-01-01 RX ADMIN — Medication 1: at 17:06

## 2022-01-01 RX ADMIN — Medication 3: at 17:26

## 2022-01-01 RX ADMIN — BRIVARACETAM 50 MILLIGRAM(S): 25 TABLET, FILM COATED ORAL at 17:34

## 2022-01-01 RX ADMIN — SEVELAMER CARBONATE 800 MILLIGRAM(S): 2400 POWDER, FOR SUSPENSION ORAL at 18:39

## 2022-01-01 RX ADMIN — Medication 3: at 13:22

## 2022-01-01 RX ADMIN — Medication 667 MILLIGRAM(S): at 12:12

## 2022-01-01 RX ADMIN — AMLODIPINE BESYLATE 10 MILLIGRAM(S): 2.5 TABLET ORAL at 06:42

## 2022-01-01 RX ADMIN — Medication 2: at 12:26

## 2022-01-01 RX ADMIN — INSULIN GLARGINE 4 UNIT(S): 100 INJECTION, SOLUTION SUBCUTANEOUS at 22:02

## 2022-01-01 RX ADMIN — Medication 500 MILLIGRAM(S): at 05:16

## 2022-01-01 RX ADMIN — Medication 40 MICROGRAM(S): at 22:04

## 2022-01-01 RX ADMIN — Medication 1300 MILLIGRAM(S): at 17:18

## 2022-01-01 RX ADMIN — MYCOPHENOLATE MOFETIL 500 MILLIGRAM(S): 250 CAPSULE ORAL at 20:05

## 2022-01-01 RX ADMIN — LACOSAMIDE 100 MILLIGRAM(S): 50 TABLET ORAL at 13:04

## 2022-01-01 RX ADMIN — Medication 2 DROP(S): at 00:08

## 2022-01-01 RX ADMIN — ATORVASTATIN CALCIUM 40 MILLIGRAM(S): 80 TABLET, FILM COATED ORAL at 21:52

## 2022-01-01 RX ADMIN — POLYETHYLENE GLYCOL 3350 17 GRAM(S): 17 POWDER, FOR SOLUTION ORAL at 12:16

## 2022-01-01 RX ADMIN — Medication 1: at 08:12

## 2022-01-01 RX ADMIN — Medication 100 MILLIGRAM(S): at 13:06

## 2022-01-01 RX ADMIN — LEVETIRACETAM 250 MILLIGRAM(S): 250 TABLET, FILM COATED ORAL at 06:11

## 2022-01-01 RX ADMIN — HEPARIN SODIUM 5000 UNIT(S): 5000 INJECTION INTRAVENOUS; SUBCUTANEOUS at 05:45

## 2022-01-01 RX ADMIN — SENNA PLUS 2 TABLET(S): 8.6 TABLET ORAL at 21:45

## 2022-01-01 RX ADMIN — Medication 500000 UNIT(S): at 17:54

## 2022-01-01 RX ADMIN — MYCOPHENOLATE MOFETIL 250 MILLIGRAM(S): 250 CAPSULE ORAL at 20:50

## 2022-01-01 RX ADMIN — Medication 200 MILLIGRAM(S): at 17:36

## 2022-01-01 RX ADMIN — Medication 4 UNIT(S): at 08:48

## 2022-01-01 RX ADMIN — POLYETHYLENE GLYCOL 3350 17 GRAM(S): 17 POWDER, FOR SOLUTION ORAL at 13:07

## 2022-01-01 RX ADMIN — AMLODIPINE BESYLATE 10 MILLIGRAM(S): 2.5 TABLET ORAL at 05:25

## 2022-01-01 RX ADMIN — CARVEDILOL PHOSPHATE 3.12 MILLIGRAM(S): 80 CAPSULE, EXTENDED RELEASE ORAL at 18:00

## 2022-01-01 RX ADMIN — HEPARIN SODIUM 9.5 UNIT(S)/HR: 5000 INJECTION INTRAVENOUS; SUBCUTANEOUS at 05:03

## 2022-01-01 RX ADMIN — Medication 10 UNIT(S): at 20:40

## 2022-01-01 RX ADMIN — Medication 2: at 12:59

## 2022-01-01 RX ADMIN — Medication 1300 MILLIGRAM(S): at 05:35

## 2022-01-01 RX ADMIN — SEVELAMER CARBONATE 800 MILLIGRAM(S): 2400 POWDER, FOR SUSPENSION ORAL at 05:31

## 2022-01-01 RX ADMIN — PANTOPRAZOLE SODIUM 40 MILLIGRAM(S): 20 TABLET, DELAYED RELEASE ORAL at 13:02

## 2022-01-01 RX ADMIN — Medication 200 MILLIGRAM(S): at 05:02

## 2022-01-01 RX ADMIN — INSULIN GLARGINE 5 UNIT(S): 100 INJECTION, SOLUTION SUBCUTANEOUS at 22:06

## 2022-01-01 RX ADMIN — PHENYLEPHRINE HYDROCHLORIDE 13.9 MICROGRAM(S)/KG/MIN: 10 INJECTION INTRAVENOUS at 18:26

## 2022-01-01 RX ADMIN — Medication 500000 UNIT(S): at 00:08

## 2022-01-01 RX ADMIN — Medication 5 MILLIGRAM(S): at 11:35

## 2022-01-01 RX ADMIN — POLYETHYLENE GLYCOL 3350 17 GRAM(S): 17 POWDER, FOR SOLUTION ORAL at 12:54

## 2022-01-01 RX ADMIN — ERTAPENEM SODIUM 120 MILLIGRAM(S): 1 INJECTION, POWDER, LYOPHILIZED, FOR SOLUTION INTRAMUSCULAR; INTRAVENOUS at 18:02

## 2022-01-01 RX ADMIN — Medication 4 UNIT(S): at 17:51

## 2022-01-01 RX ADMIN — SIROLIMUS 7 MILLIGRAM(S): 1 SOLUTION ORAL at 08:45

## 2022-01-01 RX ADMIN — Medication 50 MICROGRAM(S): at 05:37

## 2022-01-01 RX ADMIN — INSULIN GLARGINE 6 UNIT(S): 100 INJECTION, SOLUTION SUBCUTANEOUS at 22:25

## 2022-01-01 RX ADMIN — DIVALPROEX SODIUM 250 MILLIGRAM(S): 500 TABLET, DELAYED RELEASE ORAL at 11:39

## 2022-01-01 RX ADMIN — MYCOPHENOLATE MOFETIL 250 MILLIGRAM(S): 250 CAPSULE ORAL at 07:44

## 2022-01-01 RX ADMIN — INSULIN GLARGINE 5 UNIT(S): 100 INJECTION, SOLUTION SUBCUTANEOUS at 22:33

## 2022-01-01 RX ADMIN — Medication 8 UNIT(S): at 17:49

## 2022-01-01 RX ADMIN — Medication 3 UNIT(S): at 08:40

## 2022-01-01 RX ADMIN — Medication 20 MILLIGRAM(S): at 05:13

## 2022-01-01 RX ADMIN — Medication 500 MILLIGRAM(S): at 06:07

## 2022-01-01 RX ADMIN — PANTOPRAZOLE SODIUM 40 MILLIGRAM(S): 20 TABLET, DELAYED RELEASE ORAL at 11:21

## 2022-01-01 RX ADMIN — Medication 1 TABLET(S): at 12:17

## 2022-01-01 RX ADMIN — Medication 500000 UNIT(S): at 05:35

## 2022-01-01 RX ADMIN — INSULIN GLARGINE 5 UNIT(S): 100 INJECTION, SOLUTION SUBCUTANEOUS at 21:28

## 2022-01-01 RX ADMIN — SEVELAMER CARBONATE 800 MILLIGRAM(S): 2400 POWDER, FOR SUSPENSION ORAL at 12:20

## 2022-01-01 RX ADMIN — CHLORHEXIDINE GLUCONATE 1 APPLICATION(S): 213 SOLUTION TOPICAL at 07:39

## 2022-01-01 RX ADMIN — Medication 25 MILLIGRAM(S): at 23:17

## 2022-01-01 RX ADMIN — MYCOPHENOLATE MOFETIL 1000 MILLIGRAM(S): 250 CAPSULE ORAL at 07:43

## 2022-01-01 RX ADMIN — Medication 650 MILLIGRAM(S): at 23:24

## 2022-01-01 RX ADMIN — ENOXAPARIN SODIUM 60 MILLIGRAM(S): 100 INJECTION SUBCUTANEOUS at 20:50

## 2022-01-01 RX ADMIN — BRIVARACETAM 50 MILLIGRAM(S): 25 TABLET, FILM COATED ORAL at 20:25

## 2022-01-01 RX ADMIN — Medication 500000 UNIT(S): at 23:01

## 2022-01-01 RX ADMIN — Medication 2: at 17:33

## 2022-01-01 RX ADMIN — ERYTHROPOIETIN 10000 UNIT(S): 10000 INJECTION, SOLUTION INTRAVENOUS; SUBCUTANEOUS at 12:04

## 2022-01-01 RX ADMIN — Medication 1 TABLET(S): at 13:04

## 2022-01-01 RX ADMIN — APIXABAN 5 MILLIGRAM(S): 2.5 TABLET, FILM COATED ORAL at 05:52

## 2022-01-01 RX ADMIN — DIVALPROEX SODIUM 500 MILLIGRAM(S): 500 TABLET, DELAYED RELEASE ORAL at 20:01

## 2022-01-01 RX ADMIN — CHLORHEXIDINE GLUCONATE 1 APPLICATION(S): 213 SOLUTION TOPICAL at 08:42

## 2022-01-01 RX ADMIN — Medication 75 MILLIGRAM(S): at 15:35

## 2022-01-01 RX ADMIN — FINASTERIDE 5 MILLIGRAM(S): 5 TABLET, FILM COATED ORAL at 12:11

## 2022-01-01 RX ADMIN — AMLODIPINE BESYLATE 10 MILLIGRAM(S): 2.5 TABLET ORAL at 06:18

## 2022-01-01 RX ADMIN — MAGNESIUM OXIDE 400 MG ORAL TABLET 400 MILLIGRAM(S): 241.3 TABLET ORAL at 05:36

## 2022-01-01 RX ADMIN — Medication 200 MILLIGRAM(S): at 06:22

## 2022-01-01 RX ADMIN — INSULIN GLARGINE 10 UNIT(S): 100 INJECTION, SOLUTION SUBCUTANEOUS at 21:19

## 2022-01-01 RX ADMIN — FOSPHENYTOIN 108 MILLIGRAM(S) PE: 50 INJECTION INTRAMUSCULAR; INTRAVENOUS at 09:43

## 2022-01-01 RX ADMIN — AMLODIPINE BESYLATE 10 MILLIGRAM(S): 2.5 TABLET ORAL at 05:13

## 2022-01-01 RX ADMIN — PIPERACILLIN AND TAZOBACTAM 25 GRAM(S): 4; .5 INJECTION, POWDER, LYOPHILIZED, FOR SOLUTION INTRAVENOUS at 11:26

## 2022-01-01 RX ADMIN — MYCOPHENOLATE MOFETIL 250 MILLIGRAM(S): 250 CAPSULE ORAL at 07:30

## 2022-01-01 RX ADMIN — Medication 4 MILLIGRAM(S): at 20:45

## 2022-01-01 RX ADMIN — Medication 1 TABLET(S): at 12:08

## 2022-01-01 RX ADMIN — Medication 4 MILLIGRAM(S): at 05:50

## 2022-01-01 RX ADMIN — Medication 50 MICROGRAM(S): at 05:50

## 2022-01-01 RX ADMIN — ENOXAPARIN SODIUM 60 MILLIGRAM(S): 100 INJECTION SUBCUTANEOUS at 09:34

## 2022-01-01 RX ADMIN — Medication 3 UNIT(S): at 08:24

## 2022-01-01 RX ADMIN — HUMAN INSULIN 2 UNIT(S): 100 INJECTION, SUSPENSION SUBCUTANEOUS at 23:09

## 2022-01-01 RX ADMIN — BRIVARACETAM 50 MILLIGRAM(S): 25 TABLET, FILM COATED ORAL at 08:58

## 2022-01-01 RX ADMIN — Medication 200 MILLIGRAM(S): at 06:45

## 2022-01-01 RX ADMIN — Medication 500000 UNIT(S): at 06:40

## 2022-01-01 RX ADMIN — PERAMPANEL 4 MILLIGRAM(S): 2 TABLET ORAL at 22:16

## 2022-01-01 RX ADMIN — Medication 50 MICROGRAM(S): at 05:13

## 2022-01-01 RX ADMIN — SENNA PLUS 2 TABLET(S): 8.6 TABLET ORAL at 21:52

## 2022-01-01 RX ADMIN — Medication 1 GRAM(S): at 17:22

## 2022-01-01 RX ADMIN — FLUCONAZOLE 100 MILLIGRAM(S): 150 TABLET ORAL at 15:14

## 2022-01-01 RX ADMIN — CHLORHEXIDINE GLUCONATE 1 APPLICATION(S): 213 SOLUTION TOPICAL at 05:15

## 2022-01-01 RX ADMIN — Medication 1: at 22:00

## 2022-01-01 RX ADMIN — Medication 4: at 13:18

## 2022-01-01 RX ADMIN — Medication 2: at 18:49

## 2022-01-01 RX ADMIN — CARVEDILOL PHOSPHATE 12.5 MILLIGRAM(S): 80 CAPSULE, EXTENDED RELEASE ORAL at 05:11

## 2022-01-01 RX ADMIN — CARVEDILOL PHOSPHATE 6.25 MILLIGRAM(S): 80 CAPSULE, EXTENDED RELEASE ORAL at 18:28

## 2022-01-01 RX ADMIN — POLYETHYLENE GLYCOL 3350 17 GRAM(S): 17 POWDER, FOR SOLUTION ORAL at 12:12

## 2022-01-01 RX ADMIN — LEVETIRACETAM 250 MILLIGRAM(S): 250 TABLET, FILM COATED ORAL at 18:35

## 2022-01-01 RX ADMIN — FOSPHENYTOIN 108 MILLIGRAM(S) PE: 50 INJECTION INTRAMUSCULAR; INTRAVENOUS at 17:45

## 2022-01-01 RX ADMIN — CARVEDILOL PHOSPHATE 12.5 MILLIGRAM(S): 80 CAPSULE, EXTENDED RELEASE ORAL at 05:33

## 2022-01-01 RX ADMIN — Medication 2 UNIT(S): at 22:33

## 2022-01-01 RX ADMIN — Medication 1 TABLET(S): at 11:23

## 2022-01-01 RX ADMIN — Medication 2: at 08:27

## 2022-01-01 RX ADMIN — Medication 25 MICROGRAM(S): at 23:25

## 2022-01-01 RX ADMIN — Medication 4: at 10:24

## 2022-01-01 RX ADMIN — FINASTERIDE 5 MILLIGRAM(S): 5 TABLET, FILM COATED ORAL at 11:38

## 2022-01-01 RX ADMIN — Medication 1300 MILLIGRAM(S): at 19:23

## 2022-01-01 RX ADMIN — Medication 3: at 08:25

## 2022-01-01 RX ADMIN — Medication 1 TABLET(S): at 11:29

## 2022-01-01 RX ADMIN — HEPARIN SODIUM 5000 UNIT(S): 5000 INJECTION INTRAVENOUS; SUBCUTANEOUS at 21:13

## 2022-01-01 RX ADMIN — Medication 2 DROP(S): at 05:52

## 2022-01-01 RX ADMIN — Medication 1 TABLET(S): at 11:25

## 2022-01-01 RX ADMIN — Medication 100 MILLIGRAM(S): at 06:23

## 2022-01-01 RX ADMIN — PANTOPRAZOLE SODIUM 40 MILLIGRAM(S): 20 TABLET, DELAYED RELEASE ORAL at 11:16

## 2022-01-01 RX ADMIN — Medication 4 MILLIGRAM(S): at 06:08

## 2022-01-01 RX ADMIN — Medication 200 MILLIGRAM(S): at 05:28

## 2022-01-01 RX ADMIN — Medication 200 MILLIGRAM(S): at 17:33

## 2022-01-01 RX ADMIN — Medication 500000 UNIT(S): at 17:42

## 2022-01-01 RX ADMIN — INSULIN GLARGINE 10 UNIT(S): 100 INJECTION, SOLUTION SUBCUTANEOUS at 23:38

## 2022-01-01 RX ADMIN — AMLODIPINE BESYLATE 10 MILLIGRAM(S): 2.5 TABLET ORAL at 05:11

## 2022-01-01 RX ADMIN — MYCOPHENOLATE MOFETIL 250 MILLIGRAM(S): 250 CAPSULE ORAL at 20:05

## 2022-01-01 RX ADMIN — Medication 3 MILLIGRAM(S): at 23:09

## 2022-01-01 RX ADMIN — BRIVARACETAM 50 MILLIGRAM(S): 25 TABLET, FILM COATED ORAL at 21:33

## 2022-01-01 RX ADMIN — BRIVARACETAM 50 MILLIGRAM(S): 25 TABLET, FILM COATED ORAL at 17:48

## 2022-01-01 RX ADMIN — WARFARIN SODIUM 5 MILLIGRAM(S): 2.5 TABLET ORAL at 21:43

## 2022-01-01 RX ADMIN — Medication 50 MICROGRAM(S): at 06:08

## 2022-01-01 RX ADMIN — Medication 500000 UNIT(S): at 23:32

## 2022-01-01 RX ADMIN — HEPARIN SODIUM 5000 UNIT(S): 5000 INJECTION INTRAVENOUS; SUBCUTANEOUS at 21:55

## 2022-01-01 RX ADMIN — MYCOPHENOLATE MOFETIL 500 MILLIGRAM(S): 250 CAPSULE ORAL at 09:21

## 2022-01-01 RX ADMIN — Medication 2: at 21:36

## 2022-01-01 RX ADMIN — Medication 500000 UNIT(S): at 17:12

## 2022-01-01 RX ADMIN — Medication 25 MILLIGRAM(S): at 05:14

## 2022-01-01 RX ADMIN — Medication 100 MILLIGRAM(S): at 21:34

## 2022-01-01 RX ADMIN — Medication 8 UNIT(S): at 13:01

## 2022-01-01 RX ADMIN — Medication 40 MICROGRAM(S): at 21:11

## 2022-01-01 RX ADMIN — FINASTERIDE 5 MILLIGRAM(S): 5 TABLET, FILM COATED ORAL at 12:23

## 2022-01-01 RX ADMIN — Medication 25 MILLIGRAM(S): at 21:44

## 2022-01-01 RX ADMIN — Medication 1: at 09:05

## 2022-01-01 RX ADMIN — FINASTERIDE 5 MILLIGRAM(S): 5 TABLET, FILM COATED ORAL at 13:23

## 2022-01-01 RX ADMIN — Medication 25 GRAM(S): at 08:03

## 2022-01-01 RX ADMIN — DIVALPROEX SODIUM 500 MILLIGRAM(S): 500 TABLET, DELAYED RELEASE ORAL at 20:44

## 2022-01-01 RX ADMIN — HYDROMORPHONE HYDROCHLORIDE 0.5 MILLIGRAM(S): 2 INJECTION INTRAMUSCULAR; INTRAVENOUS; SUBCUTANEOUS at 09:55

## 2022-01-01 RX ADMIN — Medication 1 TABLET(S): at 11:58

## 2022-01-01 RX ADMIN — Medication 4 MILLIGRAM(S): at 05:23

## 2022-01-01 RX ADMIN — CHLORHEXIDINE GLUCONATE 1 APPLICATION(S): 213 SOLUTION TOPICAL at 06:03

## 2022-01-01 RX ADMIN — Medication 10 MILLIGRAM(S): at 05:47

## 2022-01-01 RX ADMIN — Medication 25 MILLIGRAM(S): at 19:05

## 2022-01-01 RX ADMIN — Medication 4 UNIT(S): at 12:49

## 2022-01-01 RX ADMIN — Medication 50 MILLIGRAM(S): at 05:08

## 2022-01-01 RX ADMIN — ENOXAPARIN SODIUM 60 MILLIGRAM(S): 100 INJECTION SUBCUTANEOUS at 20:12

## 2022-01-01 RX ADMIN — FLUCONAZOLE 100 MILLIGRAM(S): 150 TABLET ORAL at 08:16

## 2022-01-01 RX ADMIN — Medication 650 MILLIGRAM(S): at 14:45

## 2022-01-01 RX ADMIN — Medication 5 MILLIGRAM(S): at 05:02

## 2022-01-01 RX ADMIN — Medication 500000 UNIT(S): at 23:30

## 2022-01-01 RX ADMIN — Medication 80 MILLIGRAM(S): at 06:56

## 2022-01-01 RX ADMIN — INSULIN GLARGINE 7 UNIT(S): 100 INJECTION, SOLUTION SUBCUTANEOUS at 22:07

## 2022-01-01 RX ADMIN — Medication 1 TABLET(S): at 13:31

## 2022-01-01 RX ADMIN — Medication 50 MICROGRAM(S): at 05:19

## 2022-01-01 RX ADMIN — Medication 50 MICROGRAM(S): at 05:03

## 2022-01-01 RX ADMIN — AMLODIPINE BESYLATE 10 MILLIGRAM(S): 2.5 TABLET ORAL at 05:30

## 2022-01-01 RX ADMIN — SODIUM CHLORIDE 250 MILLILITER(S): 9 INJECTION INTRAMUSCULAR; INTRAVENOUS; SUBCUTANEOUS at 13:09

## 2022-01-01 RX ADMIN — VALGANCICLOVIR 450 MILLIGRAM(S): 450 TABLET, FILM COATED ORAL at 13:05

## 2022-01-01 RX ADMIN — Medication 10 MILLIGRAM(S): at 00:53

## 2022-01-01 RX ADMIN — Medication 4 MILLIGRAM(S): at 05:18

## 2022-01-01 RX ADMIN — Medication 1 TABLET(S): at 11:40

## 2022-01-01 RX ADMIN — CARVEDILOL PHOSPHATE 3.12 MILLIGRAM(S): 80 CAPSULE, EXTENDED RELEASE ORAL at 20:54

## 2022-01-01 RX ADMIN — Medication 200 MILLIGRAM(S): at 17:34

## 2022-01-01 RX ADMIN — PANTOPRAZOLE SODIUM 40 MILLIGRAM(S): 20 TABLET, DELAYED RELEASE ORAL at 14:23

## 2022-01-01 RX ADMIN — ERYTHROPOIETIN 10000 UNIT(S): 10000 INJECTION, SOLUTION INTRAVENOUS; SUBCUTANEOUS at 15:21

## 2022-01-01 RX ADMIN — Medication 500000 UNIT(S): at 17:30

## 2022-01-01 RX ADMIN — Medication 500000 UNIT(S): at 18:41

## 2022-01-01 RX ADMIN — SODIUM CHLORIDE 75 MILLILITER(S): 9 INJECTION INTRAMUSCULAR; INTRAVENOUS; SUBCUTANEOUS at 07:17

## 2022-01-01 RX ADMIN — Medication 2: at 13:13

## 2022-01-01 RX ADMIN — PANTOPRAZOLE SODIUM 40 MILLIGRAM(S): 20 TABLET, DELAYED RELEASE ORAL at 06:00

## 2022-01-01 RX ADMIN — BRIVARACETAM 50 MILLIGRAM(S): 25 TABLET, FILM COATED ORAL at 05:53

## 2022-01-01 RX ADMIN — Medication 25 GRAM(S): at 01:49

## 2022-01-01 RX ADMIN — CARVEDILOL PHOSPHATE 3.12 MILLIGRAM(S): 80 CAPSULE, EXTENDED RELEASE ORAL at 17:06

## 2022-01-01 RX ADMIN — Medication 5 UNIT(S): at 08:02

## 2022-01-01 RX ADMIN — Medication 250 MILLIGRAM(S): at 20:05

## 2022-01-01 RX ADMIN — Medication 1: at 17:10

## 2022-01-01 RX ADMIN — Medication 1 APPLICATION(S): at 17:20

## 2022-01-01 RX ADMIN — Medication 40 MICROGRAM(S): at 21:28

## 2022-01-01 RX ADMIN — MYCOPHENOLATE MOFETIL 500 MILLIGRAM(S): 250 CAPSULE ORAL at 08:01

## 2022-01-01 RX ADMIN — LACOSAMIDE 120 MILLIGRAM(S): 50 TABLET ORAL at 07:46

## 2022-01-01 RX ADMIN — Medication 500000 UNIT(S): at 05:59

## 2022-01-01 RX ADMIN — SEVELAMER CARBONATE 800 MILLIGRAM(S): 2400 POWDER, FOR SUSPENSION ORAL at 13:31

## 2022-01-01 RX ADMIN — Medication 2 DROP(S): at 12:49

## 2022-01-01 RX ADMIN — PANTOPRAZOLE SODIUM 40 MILLIGRAM(S): 20 TABLET, DELAYED RELEASE ORAL at 05:59

## 2022-01-01 RX ADMIN — POLYETHYLENE GLYCOL 3350 17 GRAM(S): 17 POWDER, FOR SOLUTION ORAL at 12:11

## 2022-01-01 RX ADMIN — Medication 200 MILLIGRAM(S): at 17:40

## 2022-01-01 RX ADMIN — SENNA PLUS 10 MILLILITER(S): 8.6 TABLET ORAL at 12:00

## 2022-01-01 RX ADMIN — FOSPHENYTOIN 104 MILLIGRAM(S) PE: 50 INJECTION INTRAMUSCULAR; INTRAVENOUS at 16:13

## 2022-01-01 RX ADMIN — PANTOPRAZOLE SODIUM 40 MILLIGRAM(S): 20 TABLET, DELAYED RELEASE ORAL at 10:16

## 2022-01-01 RX ADMIN — SENNA PLUS 2 TABLET(S): 8.6 TABLET ORAL at 21:37

## 2022-01-01 RX ADMIN — CARVEDILOL PHOSPHATE 12.5 MILLIGRAM(S): 80 CAPSULE, EXTENDED RELEASE ORAL at 06:10

## 2022-01-01 RX ADMIN — FOSPHENYTOIN 104 MILLIGRAM(S) PE: 50 INJECTION INTRAMUSCULAR; INTRAVENOUS at 18:26

## 2022-01-01 RX ADMIN — Medication 5 UNIT(S): at 11:43

## 2022-01-01 RX ADMIN — SODIUM ZIRCONIUM CYCLOSILICATE 5 GRAM(S): 10 POWDER, FOR SUSPENSION ORAL at 01:27

## 2022-01-01 RX ADMIN — CARVEDILOL PHOSPHATE 12.5 MILLIGRAM(S): 80 CAPSULE, EXTENDED RELEASE ORAL at 17:42

## 2022-01-01 RX ADMIN — Medication 200 MILLIGRAM(S): at 05:10

## 2022-01-01 RX ADMIN — Medication 650 MILLIGRAM(S): at 02:15

## 2022-01-01 RX ADMIN — Medication 4 UNIT(S): at 09:21

## 2022-01-01 RX ADMIN — CARVEDILOL PHOSPHATE 12.5 MILLIGRAM(S): 80 CAPSULE, EXTENDED RELEASE ORAL at 05:49

## 2022-01-01 RX ADMIN — Medication 100 MILLIGRAM(S): at 02:49

## 2022-01-01 RX ADMIN — Medication 4 UNIT(S): at 08:49

## 2022-01-01 RX ADMIN — LEVETIRACETAM 250 MILLIGRAM(S): 250 TABLET, FILM COATED ORAL at 18:28

## 2022-01-01 RX ADMIN — Medication 50 MICROGRAM(S): at 05:01

## 2022-01-01 RX ADMIN — PANTOPRAZOLE SODIUM 40 MILLIGRAM(S): 20 TABLET, DELAYED RELEASE ORAL at 12:33

## 2022-01-01 RX ADMIN — SENNA PLUS 2 TABLET(S): 8.6 TABLET ORAL at 21:33

## 2022-01-01 RX ADMIN — SEVELAMER CARBONATE 800 MILLIGRAM(S): 2400 POWDER, FOR SUSPENSION ORAL at 17:54

## 2022-01-01 RX ADMIN — Medication 4 MILLIGRAM(S): at 17:29

## 2022-01-01 RX ADMIN — Medication 50 MICROGRAM(S): at 06:03

## 2022-01-01 RX ADMIN — Medication 127.5 MILLIMOLE(S): at 02:57

## 2022-01-01 RX ADMIN — Medication 1300 MILLIGRAM(S): at 06:04

## 2022-01-01 RX ADMIN — WARFARIN SODIUM 6 MILLIGRAM(S): 2.5 TABLET ORAL at 21:01

## 2022-01-01 RX ADMIN — VALGANCICLOVIR 450 MILLIGRAM(S): 450 TABLET, FILM COATED ORAL at 11:38

## 2022-01-01 RX ADMIN — DIVALPROEX SODIUM 1000 MILLIGRAM(S): 500 TABLET, DELAYED RELEASE ORAL at 17:29

## 2022-01-01 RX ADMIN — MYCOPHENOLATE MOFETIL 500 MILLIGRAM(S): 250 CAPSULE ORAL at 06:13

## 2022-01-01 RX ADMIN — FINASTERIDE 5 MILLIGRAM(S): 5 TABLET, FILM COATED ORAL at 12:54

## 2022-01-01 RX ADMIN — ENOXAPARIN SODIUM 60 MILLIGRAM(S): 100 INJECTION SUBCUTANEOUS at 05:05

## 2022-01-01 RX ADMIN — LEVETIRACETAM 250 MILLIGRAM(S): 250 TABLET, FILM COATED ORAL at 05:43

## 2022-01-01 RX ADMIN — BRIVARACETAM 50 MILLIGRAM(S): 25 TABLET, FILM COATED ORAL at 21:10

## 2022-01-01 RX ADMIN — VALGANCICLOVIR 450 MILLIGRAM(S): 450 TABLET, FILM COATED ORAL at 11:17

## 2022-01-01 RX ADMIN — Medication 1 APPLICATION(S): at 06:21

## 2022-01-01 RX ADMIN — EVEROLIMUS 0.5 MILLIGRAM(S): 10 TABLET ORAL at 05:12

## 2022-01-01 RX ADMIN — SODIUM ZIRCONIUM CYCLOSILICATE 10 GRAM(S): 10 POWDER, FOR SUSPENSION ORAL at 17:30

## 2022-01-01 RX ADMIN — VALGANCICLOVIR 450 MILLIGRAM(S): 450 TABLET, FILM COATED ORAL at 09:14

## 2022-01-01 RX ADMIN — FOSPHENYTOIN 104 MILLIGRAM(S) PE: 50 INJECTION INTRAMUSCULAR; INTRAVENOUS at 15:20

## 2022-01-01 RX ADMIN — Medication 4 MILLIGRAM(S): at 17:17

## 2022-01-01 RX ADMIN — Medication 1000 MILLIGRAM(S): at 08:00

## 2022-01-01 RX ADMIN — SENNA PLUS 2 TABLET(S): 8.6 TABLET ORAL at 21:07

## 2022-01-01 RX ADMIN — INSULIN GLARGINE 8 UNIT(S): 100 INJECTION, SOLUTION SUBCUTANEOUS at 21:43

## 2022-01-01 RX ADMIN — MONTELUKAST 10 MILLIGRAM(S): 4 TABLET, CHEWABLE ORAL at 18:57

## 2022-01-01 RX ADMIN — Medication 4: at 17:37

## 2022-01-01 RX ADMIN — Medication 650 MILLIGRAM(S): at 01:32

## 2022-01-01 RX ADMIN — SODIUM ZIRCONIUM CYCLOSILICATE 10 GRAM(S): 10 POWDER, FOR SUSPENSION ORAL at 10:07

## 2022-01-01 RX ADMIN — Medication 100 MILLIGRAM(S): at 12:53

## 2022-01-01 RX ADMIN — HEPARIN SODIUM 5000 UNIT(S): 5000 INJECTION INTRAVENOUS; SUBCUTANEOUS at 17:54

## 2022-01-01 RX ADMIN — SIROLIMUS 1 MILLIGRAM(S): 1 SOLUTION ORAL at 08:16

## 2022-01-01 RX ADMIN — Medication 1300 MILLIGRAM(S): at 05:36

## 2022-01-01 RX ADMIN — VALGANCICLOVIR 450 MILLIGRAM(S): 450 TABLET, FILM COATED ORAL at 12:17

## 2022-01-01 RX ADMIN — Medication 100 MILLIGRAM(S): at 12:56

## 2022-01-01 RX ADMIN — Medication 500000 UNIT(S): at 11:09

## 2022-01-01 RX ADMIN — Medication 3: at 17:45

## 2022-01-01 RX ADMIN — BRIVARACETAM 50 MILLIGRAM(S): 25 TABLET, FILM COATED ORAL at 17:54

## 2022-01-01 RX ADMIN — DIVALPROEX SODIUM 500 MILLIGRAM(S): 500 TABLET, DELAYED RELEASE ORAL at 11:53

## 2022-01-01 RX ADMIN — Medication 500000 UNIT(S): at 05:43

## 2022-01-01 RX ADMIN — PANTOPRAZOLE SODIUM 40 MILLIGRAM(S): 20 TABLET, DELAYED RELEASE ORAL at 13:08

## 2022-01-01 RX ADMIN — Medication 2 DROP(S): at 05:59

## 2022-01-01 RX ADMIN — Medication 3 UNIT(S): at 17:49

## 2022-01-01 RX ADMIN — Medication 4: at 22:26

## 2022-01-01 RX ADMIN — PANTOPRAZOLE SODIUM 40 MILLIGRAM(S): 20 TABLET, DELAYED RELEASE ORAL at 11:42

## 2022-01-01 RX ADMIN — Medication 1: at 22:13

## 2022-01-01 RX ADMIN — INSULIN GLARGINE 8 UNIT(S): 100 INJECTION, SOLUTION SUBCUTANEOUS at 21:36

## 2022-01-01 RX ADMIN — Medication 650 MILLIGRAM(S): at 15:02

## 2022-01-01 RX ADMIN — FLUCONAZOLE 200 MILLIGRAM(S): 150 TABLET ORAL at 12:18

## 2022-01-01 RX ADMIN — Medication 200 MILLIGRAM(S): at 06:18

## 2022-01-01 RX ADMIN — Medication 8 MILLIGRAM(S): at 22:06

## 2022-01-01 RX ADMIN — INSULIN GLARGINE 4 UNIT(S): 100 INJECTION, SOLUTION SUBCUTANEOUS at 21:59

## 2022-01-01 RX ADMIN — ERYTHROPOIETIN 10000 UNIT(S): 10000 INJECTION, SOLUTION INTRAVENOUS; SUBCUTANEOUS at 12:09

## 2022-01-01 RX ADMIN — Medication 500000 UNIT(S): at 18:13

## 2022-01-01 RX ADMIN — BRIVARACETAM 50 MILLIGRAM(S): 25 TABLET, FILM COATED ORAL at 11:29

## 2022-01-01 RX ADMIN — Medication 1 DROP(S): at 05:31

## 2022-01-01 RX ADMIN — Medication 650 MILLIGRAM(S): at 16:39

## 2022-01-01 RX ADMIN — Medication 1: at 08:55

## 2022-01-01 RX ADMIN — DIVALPROEX SODIUM 500 MILLIGRAM(S): 500 TABLET, DELAYED RELEASE ORAL at 07:41

## 2022-01-01 RX ADMIN — FINASTERIDE 5 MILLIGRAM(S): 5 TABLET, FILM COATED ORAL at 11:58

## 2022-01-01 RX ADMIN — FLUCONAZOLE 100 MILLIGRAM(S): 150 TABLET ORAL at 09:03

## 2022-01-01 RX ADMIN — Medication 500000 UNIT(S): at 11:07

## 2022-01-01 RX ADMIN — LEVETIRACETAM 250 MILLIGRAM(S): 250 TABLET, FILM COATED ORAL at 17:11

## 2022-01-01 RX ADMIN — Medication 25 MILLIGRAM(S): at 15:22

## 2022-01-01 RX ADMIN — BRIVARACETAM 50 MILLIGRAM(S): 25 TABLET, FILM COATED ORAL at 17:27

## 2022-01-01 RX ADMIN — Medication 5 UNIT(S): at 07:57

## 2022-01-01 RX ADMIN — Medication 1 GRAM(S): at 17:24

## 2022-01-01 RX ADMIN — Medication 10 MILLIGRAM(S): at 05:19

## 2022-01-01 RX ADMIN — Medication 200 MILLIGRAM(S): at 17:51

## 2022-01-01 RX ADMIN — ENOXAPARIN SODIUM 60 MILLIGRAM(S): 100 INJECTION SUBCUTANEOUS at 09:23

## 2022-01-01 RX ADMIN — LEVETIRACETAM 250 MILLIGRAM(S): 250 TABLET, FILM COATED ORAL at 06:00

## 2022-01-01 RX ADMIN — Medication 50 MICROGRAM(S): at 05:04

## 2022-01-01 RX ADMIN — Medication 40 MICROGRAM(S): at 22:07

## 2022-01-01 RX ADMIN — Medication 100 MILLIGRAM(S): at 13:34

## 2022-01-01 RX ADMIN — Medication 2 MILLIGRAM(S): at 12:03

## 2022-01-01 RX ADMIN — Medication 500000 UNIT(S): at 18:18

## 2022-01-01 RX ADMIN — MEROPENEM 100 MILLIGRAM(S): 1 INJECTION INTRAVENOUS at 17:28

## 2022-01-01 RX ADMIN — Medication 2: at 17:34

## 2022-01-01 RX ADMIN — Medication 4 UNIT(S): at 13:09

## 2022-01-01 RX ADMIN — Medication 1300 MILLIGRAM(S): at 21:13

## 2022-01-01 RX ADMIN — Medication 500000 UNIT(S): at 05:11

## 2022-01-01 RX ADMIN — Medication 650 MILLIGRAM(S): at 13:17

## 2022-01-01 RX ADMIN — TACROLIMUS 3 MILLIGRAM(S): 5 CAPSULE ORAL at 15:01

## 2022-01-01 RX ADMIN — HEPARIN SODIUM 5000 UNIT(S): 5000 INJECTION INTRAVENOUS; SUBCUTANEOUS at 21:27

## 2022-01-01 RX ADMIN — SIROLIMUS 3.5 MILLIGRAM(S): 1 SOLUTION ORAL at 09:37

## 2022-01-01 RX ADMIN — Medication 100 MILLIGRAM(S): at 06:03

## 2022-01-01 RX ADMIN — INSULIN GLARGINE 6 UNIT(S): 100 INJECTION, SOLUTION SUBCUTANEOUS at 21:28

## 2022-01-01 RX ADMIN — AMLODIPINE BESYLATE 10 MILLIGRAM(S): 2.5 TABLET ORAL at 05:32

## 2022-01-01 RX ADMIN — AMLODIPINE BESYLATE 10 MILLIGRAM(S): 2.5 TABLET ORAL at 05:47

## 2022-01-01 RX ADMIN — Medication 50 MICROGRAM(S): at 05:24

## 2022-01-01 RX ADMIN — MYCOPHENOLATE MOFETIL 250 MILLIGRAM(S): 250 CAPSULE ORAL at 20:31

## 2022-01-01 RX ADMIN — SEVELAMER CARBONATE 800 MILLIGRAM(S): 2400 POWDER, FOR SUSPENSION ORAL at 05:35

## 2022-01-01 RX ADMIN — Medication 3 UNIT(S): at 12:46

## 2022-01-01 RX ADMIN — ENOXAPARIN SODIUM 60 MILLIGRAM(S): 100 INJECTION SUBCUTANEOUS at 06:33

## 2022-01-01 RX ADMIN — LACOSAMIDE 120 MILLIGRAM(S): 50 TABLET ORAL at 20:00

## 2022-01-01 RX ADMIN — HEPARIN SODIUM 5000 UNIT(S): 5000 INJECTION INTRAVENOUS; SUBCUTANEOUS at 22:38

## 2022-01-01 RX ADMIN — INSULIN GLARGINE 4 UNIT(S): 100 INJECTION, SOLUTION SUBCUTANEOUS at 01:28

## 2022-01-01 RX ADMIN — Medication 3: at 11:58

## 2022-01-01 RX ADMIN — Medication 500000 UNIT(S): at 13:19

## 2022-01-01 RX ADMIN — FOSPHENYTOIN 104 MILLIGRAM(S) PE: 50 INJECTION INTRAMUSCULAR; INTRAVENOUS at 06:32

## 2022-01-01 RX ADMIN — Medication 60 MILLIGRAM(S): at 17:48

## 2022-01-01 RX ADMIN — Medication 60 MILLIGRAM(S): at 05:30

## 2022-01-01 RX ADMIN — Medication 2 DROP(S): at 12:06

## 2022-01-01 RX ADMIN — Medication 7 UNIT(S): at 17:49

## 2022-01-01 RX ADMIN — FINASTERIDE 5 MILLIGRAM(S): 5 TABLET, FILM COATED ORAL at 11:59

## 2022-01-01 RX ADMIN — Medication 4 MILLIGRAM(S): at 17:16

## 2022-01-01 RX ADMIN — Medication 1 APPLICATION(S): at 17:49

## 2022-01-01 RX ADMIN — Medication 2 DROP(S): at 06:12

## 2022-01-01 RX ADMIN — FOSPHENYTOIN 104 MILLIGRAM(S) PE: 50 INJECTION INTRAMUSCULAR; INTRAVENOUS at 05:26

## 2022-01-01 RX ADMIN — Medication 500000 UNIT(S): at 00:42

## 2022-01-01 RX ADMIN — Medication 50 MICROGRAM(S): at 05:08

## 2022-01-01 RX ADMIN — Medication 500000 UNIT(S): at 05:13

## 2022-01-01 RX ADMIN — Medication 500000 UNIT(S): at 12:53

## 2022-01-01 RX ADMIN — Medication 2 DROP(S): at 05:16

## 2022-01-01 RX ADMIN — Medication 100 MILLIGRAM(S): at 21:38

## 2022-01-01 RX ADMIN — Medication 1300 MILLIGRAM(S): at 22:10

## 2022-01-01 RX ADMIN — Medication 4 MILLIGRAM(S): at 05:03

## 2022-01-01 RX ADMIN — Medication 500000 UNIT(S): at 00:12

## 2022-01-01 RX ADMIN — Medication 80 MILLIGRAM(S): at 06:01

## 2022-01-01 RX ADMIN — ENOXAPARIN SODIUM 60 MILLIGRAM(S): 100 INJECTION SUBCUTANEOUS at 17:09

## 2022-01-01 RX ADMIN — Medication 200 MILLIGRAM(S): at 17:44

## 2022-01-01 RX ADMIN — Medication 5 MILLIGRAM(S): at 05:40

## 2022-01-01 RX ADMIN — Medication 500 MILLIGRAM(S): at 17:29

## 2022-01-01 RX ADMIN — LEVETIRACETAM 250 MILLIGRAM(S): 250 TABLET, FILM COATED ORAL at 05:49

## 2022-01-01 RX ADMIN — MEROPENEM 100 MILLIGRAM(S): 1 INJECTION INTRAVENOUS at 05:14

## 2022-01-01 RX ADMIN — ATORVASTATIN CALCIUM 80 MILLIGRAM(S): 80 TABLET, FILM COATED ORAL at 21:14

## 2022-01-01 RX ADMIN — Medication 100 MILLIGRAM(S): at 05:33

## 2022-01-01 RX ADMIN — LACOSAMIDE 100 MILLIGRAM(S): 50 TABLET ORAL at 08:58

## 2022-01-01 RX ADMIN — ATORVASTATIN CALCIUM 40 MILLIGRAM(S): 80 TABLET, FILM COATED ORAL at 22:05

## 2022-01-01 RX ADMIN — ONDANSETRON 4 MILLIGRAM(S): 8 TABLET, FILM COATED ORAL at 00:51

## 2022-01-01 RX ADMIN — Medication 5 MILLIGRAM(S): at 05:34

## 2022-01-01 RX ADMIN — SIROLIMUS 1 MILLIGRAM(S): 1 SOLUTION ORAL at 15:20

## 2022-01-01 RX ADMIN — Medication 1 TABLET(S): at 12:00

## 2022-01-01 RX ADMIN — HEPARIN SODIUM 5000 UNIT(S): 5000 INJECTION INTRAVENOUS; SUBCUTANEOUS at 05:18

## 2022-01-01 RX ADMIN — MYCOPHENOLATE MOFETIL 500 MILLIGRAM(S): 250 CAPSULE ORAL at 20:57

## 2022-01-01 RX ADMIN — Medication 318 MILLIGRAM(S): at 20:45

## 2022-01-01 RX ADMIN — CARVEDILOL PHOSPHATE 12.5 MILLIGRAM(S): 80 CAPSULE, EXTENDED RELEASE ORAL at 06:01

## 2022-01-01 RX ADMIN — Medication 500000 UNIT(S): at 05:34

## 2022-01-01 RX ADMIN — HEPARIN SODIUM 5000 UNIT(S): 5000 INJECTION INTRAVENOUS; SUBCUTANEOUS at 20:16

## 2022-01-01 RX ADMIN — Medication 50 MICROGRAM(S): at 05:25

## 2022-01-01 RX ADMIN — CHLORHEXIDINE GLUCONATE 1 APPLICATION(S): 213 SOLUTION TOPICAL at 06:54

## 2022-01-01 RX ADMIN — DIVALPROEX SODIUM 1000 MILLIGRAM(S): 500 TABLET, DELAYED RELEASE ORAL at 05:43

## 2022-01-01 RX ADMIN — SIROLIMUS 3.5 MILLIGRAM(S): 1 SOLUTION ORAL at 08:51

## 2022-01-01 RX ADMIN — Medication 2: at 05:23

## 2022-01-01 RX ADMIN — Medication 25 MILLIGRAM(S): at 22:31

## 2022-01-01 RX ADMIN — Medication 1 TABLET(S): at 12:19

## 2022-01-01 RX ADMIN — ATORVASTATIN CALCIUM 40 MILLIGRAM(S): 80 TABLET, FILM COATED ORAL at 21:39

## 2022-01-01 RX ADMIN — Medication 650 MILLIGRAM(S): at 17:33

## 2022-01-01 RX ADMIN — Medication 1 TABLET(S): at 10:37

## 2022-01-01 RX ADMIN — Medication 10 MILLIGRAM(S): at 06:01

## 2022-01-01 RX ADMIN — DIVALPROEX SODIUM 250 MILLIGRAM(S): 500 TABLET, DELAYED RELEASE ORAL at 13:08

## 2022-01-01 RX ADMIN — INSULIN GLARGINE 5 UNIT(S): 100 INJECTION, SOLUTION SUBCUTANEOUS at 21:42

## 2022-01-01 RX ADMIN — PANTOPRAZOLE SODIUM 40 MILLIGRAM(S): 20 TABLET, DELAYED RELEASE ORAL at 09:03

## 2022-01-01 RX ADMIN — INSULIN GLARGINE 5 UNIT(S): 100 INJECTION, SOLUTION SUBCUTANEOUS at 22:02

## 2022-01-01 RX ADMIN — BRIVARACETAM 50 MILLIGRAM(S): 25 TABLET, FILM COATED ORAL at 08:57

## 2022-01-01 RX ADMIN — HEPARIN SODIUM 5000 UNIT(S): 5000 INJECTION INTRAVENOUS; SUBCUTANEOUS at 05:10

## 2022-01-01 RX ADMIN — Medication 100 MILLIGRAM(S): at 22:59

## 2022-01-01 RX ADMIN — Medication 500000 UNIT(S): at 05:22

## 2022-01-01 RX ADMIN — POLYETHYLENE GLYCOL 3350 17 GRAM(S): 17 POWDER, FOR SOLUTION ORAL at 12:48

## 2022-01-01 RX ADMIN — SENNA PLUS 2 TABLET(S): 8.6 TABLET ORAL at 20:08

## 2022-01-01 RX ADMIN — Medication 5 UNIT(S): at 12:42

## 2022-01-01 RX ADMIN — FOSPHENYTOIN 104 MILLIGRAM(S) PE: 50 INJECTION INTRAMUSCULAR; INTRAVENOUS at 05:18

## 2022-01-01 RX ADMIN — CHLORHEXIDINE GLUCONATE 1 APPLICATION(S): 213 SOLUTION TOPICAL at 06:29

## 2022-01-01 RX ADMIN — HEPARIN SODIUM 5000 UNIT(S): 5000 INJECTION INTRAVENOUS; SUBCUTANEOUS at 17:46

## 2022-01-01 RX ADMIN — FINASTERIDE 5 MILLIGRAM(S): 5 TABLET, FILM COATED ORAL at 11:56

## 2022-01-01 RX ADMIN — Medication 500000 UNIT(S): at 06:16

## 2022-01-01 RX ADMIN — ATORVASTATIN CALCIUM 40 MILLIGRAM(S): 80 TABLET, FILM COATED ORAL at 21:37

## 2022-01-01 RX ADMIN — Medication 25 MILLIGRAM(S): at 00:50

## 2022-01-01 RX ADMIN — AMLODIPINE BESYLATE 10 MILLIGRAM(S): 2.5 TABLET ORAL at 08:23

## 2022-01-01 RX ADMIN — FINASTERIDE 5 MILLIGRAM(S): 5 TABLET, FILM COATED ORAL at 12:55

## 2022-01-01 RX ADMIN — PANTOPRAZOLE SODIUM 40 MILLIGRAM(S): 20 TABLET, DELAYED RELEASE ORAL at 18:08

## 2022-01-01 RX ADMIN — FINASTERIDE 5 MILLIGRAM(S): 5 TABLET, FILM COATED ORAL at 13:17

## 2022-01-01 RX ADMIN — Medication 100 MILLIGRAM(S): at 13:07

## 2022-01-01 RX ADMIN — Medication 1: at 12:01

## 2022-01-01 RX ADMIN — Medication 50 MILLIGRAM(S): at 06:03

## 2022-01-01 RX ADMIN — Medication 5 UNIT(S): at 08:04

## 2022-01-01 RX ADMIN — Medication 100 MILLIGRAM(S): at 14:49

## 2022-01-01 RX ADMIN — AMLODIPINE BESYLATE 10 MILLIGRAM(S): 2.5 TABLET ORAL at 08:56

## 2022-01-01 RX ADMIN — Medication 6 MILLIGRAM(S): at 21:14

## 2022-01-01 RX ADMIN — Medication 200 MILLIGRAM(S): at 06:00

## 2022-01-01 RX ADMIN — Medication 20 MILLIGRAM(S): at 07:39

## 2022-01-01 RX ADMIN — CARVEDILOL PHOSPHATE 25 MILLIGRAM(S): 80 CAPSULE, EXTENDED RELEASE ORAL at 17:27

## 2022-01-01 RX ADMIN — APIXABAN 2.5 MILLIGRAM(S): 2.5 TABLET, FILM COATED ORAL at 21:11

## 2022-01-01 RX ADMIN — Medication 667 MILLIGRAM(S): at 12:09

## 2022-01-01 RX ADMIN — DIVALPROEX SODIUM 500 MILLIGRAM(S): 500 TABLET, DELAYED RELEASE ORAL at 08:44

## 2022-01-01 RX ADMIN — Medication 500000 UNIT(S): at 06:18

## 2022-01-01 RX ADMIN — PANTOPRAZOLE SODIUM 40 MILLIGRAM(S): 20 TABLET, DELAYED RELEASE ORAL at 05:33

## 2022-01-01 RX ADMIN — Medication 4 MILLIGRAM(S): at 06:06

## 2022-01-01 RX ADMIN — MAGNESIUM OXIDE 400 MG ORAL TABLET 400 MILLIGRAM(S): 241.3 TABLET ORAL at 05:05

## 2022-01-01 RX ADMIN — FLUCONAZOLE 100 MILLIGRAM(S): 150 TABLET ORAL at 16:05

## 2022-01-01 RX ADMIN — PANTOPRAZOLE SODIUM 40 MILLIGRAM(S): 20 TABLET, DELAYED RELEASE ORAL at 05:09

## 2022-01-01 RX ADMIN — Medication 200 GRAM(S): at 23:06

## 2022-01-01 RX ADMIN — Medication 667 MILLIGRAM(S): at 17:54

## 2022-01-01 RX ADMIN — CHLORHEXIDINE GLUCONATE 1 APPLICATION(S): 213 SOLUTION TOPICAL at 10:37

## 2022-01-01 RX ADMIN — Medication 3 UNIT(S): at 08:54

## 2022-01-01 RX ADMIN — Medication 200 MILLIGRAM(S): at 17:42

## 2022-01-01 RX ADMIN — Medication 1 APPLICATION(S): at 17:35

## 2022-01-01 RX ADMIN — Medication 2 DROP(S): at 05:56

## 2022-01-01 RX ADMIN — Medication 60 MILLIGRAM(S): at 17:29

## 2022-01-01 RX ADMIN — Medication 200 MILLIGRAM(S): at 05:24

## 2022-01-01 RX ADMIN — FINASTERIDE 5 MILLIGRAM(S): 5 TABLET, FILM COATED ORAL at 12:15

## 2022-01-01 RX ADMIN — BRIVARACETAM 50 MILLIGRAM(S): 25 TABLET, FILM COATED ORAL at 08:23

## 2022-01-01 RX ADMIN — Medication 650 MILLIGRAM(S): at 12:33

## 2022-01-01 RX ADMIN — Medication 500000 UNIT(S): at 22:53

## 2022-01-01 RX ADMIN — DIVALPROEX SODIUM 250 MILLIGRAM(S): 500 TABLET, DELAYED RELEASE ORAL at 12:12

## 2022-01-01 RX ADMIN — SODIUM ZIRCONIUM CYCLOSILICATE 10 GRAM(S): 10 POWDER, FOR SUSPENSION ORAL at 05:53

## 2022-01-01 RX ADMIN — INSULIN GLARGINE 5 UNIT(S): 100 INJECTION, SOLUTION SUBCUTANEOUS at 21:53

## 2022-01-01 RX ADMIN — CARVEDILOL PHOSPHATE 12.5 MILLIGRAM(S): 80 CAPSULE, EXTENDED RELEASE ORAL at 06:46

## 2022-01-01 RX ADMIN — POLYETHYLENE GLYCOL 3350 17 GRAM(S): 17 POWDER, FOR SOLUTION ORAL at 11:36

## 2022-01-01 RX ADMIN — Medication 200 MILLIGRAM(S): at 17:46

## 2022-01-01 RX ADMIN — CARVEDILOL PHOSPHATE 3.12 MILLIGRAM(S): 80 CAPSULE, EXTENDED RELEASE ORAL at 06:16

## 2022-01-01 RX ADMIN — HEPARIN SODIUM 5000 UNIT(S): 5000 INJECTION INTRAVENOUS; SUBCUTANEOUS at 17:38

## 2022-01-01 RX ADMIN — Medication 500000 UNIT(S): at 11:47

## 2022-01-01 RX ADMIN — Medication 500000 UNIT(S): at 12:46

## 2022-01-01 RX ADMIN — Medication 1: at 12:26

## 2022-01-01 RX ADMIN — Medication 75 MILLIGRAM(S): at 14:09

## 2022-01-01 RX ADMIN — ATORVASTATIN CALCIUM 40 MILLIGRAM(S): 80 TABLET, FILM COATED ORAL at 21:57

## 2022-01-01 RX ADMIN — MYCOPHENOLATE MOFETIL 500 MILLIGRAM(S): 250 CAPSULE ORAL at 20:38

## 2022-01-01 RX ADMIN — AMLODIPINE BESYLATE 10 MILLIGRAM(S): 2.5 TABLET ORAL at 06:55

## 2022-01-01 RX ADMIN — CHLORHEXIDINE GLUCONATE 15 MILLILITER(S): 213 SOLUTION TOPICAL at 17:16

## 2022-01-01 RX ADMIN — BRIVARACETAM 50 MILLIGRAM(S): 25 TABLET, FILM COATED ORAL at 17:35

## 2022-01-01 RX ADMIN — Medication 2: at 12:57

## 2022-01-01 RX ADMIN — DIVALPROEX SODIUM 250 MILLIGRAM(S): 500 TABLET, DELAYED RELEASE ORAL at 11:34

## 2022-01-01 RX ADMIN — Medication 500000 UNIT(S): at 05:30

## 2022-01-01 RX ADMIN — CARVEDILOL PHOSPHATE 12.5 MILLIGRAM(S): 80 CAPSULE, EXTENDED RELEASE ORAL at 17:24

## 2022-01-01 RX ADMIN — Medication 650 MILLIGRAM(S): at 04:20

## 2022-01-01 RX ADMIN — INSULIN GLARGINE 12 UNIT(S): 100 INJECTION, SOLUTION SUBCUTANEOUS at 21:27

## 2022-01-01 RX ADMIN — SEVELAMER CARBONATE 800 MILLIGRAM(S): 2400 POWDER, FOR SUSPENSION ORAL at 11:57

## 2022-01-01 RX ADMIN — VALGANCICLOVIR 450 MILLIGRAM(S): 450 TABLET, FILM COATED ORAL at 11:16

## 2022-01-01 RX ADMIN — FINASTERIDE 5 MILLIGRAM(S): 5 TABLET, FILM COATED ORAL at 11:31

## 2022-01-01 RX ADMIN — Medication 8 UNIT(S): at 12:50

## 2022-01-01 RX ADMIN — Medication 50 MICROGRAM(S): at 06:10

## 2022-01-01 RX ADMIN — INSULIN GLARGINE 12 UNIT(S): 100 INJECTION, SOLUTION SUBCUTANEOUS at 21:34

## 2022-01-01 RX ADMIN — Medication 6 MILLIGRAM(S): at 21:47

## 2022-01-01 RX ADMIN — Medication 500000 UNIT(S): at 00:56

## 2022-01-01 RX ADMIN — MYCOPHENOLATE MOFETIL 1 GRAM(S): 250 CAPSULE ORAL at 07:27

## 2022-01-01 RX ADMIN — ENOXAPARIN SODIUM 60 MILLIGRAM(S): 100 INJECTION SUBCUTANEOUS at 08:52

## 2022-01-01 RX ADMIN — Medication 400 MILLIGRAM(S): at 04:08

## 2022-01-01 RX ADMIN — Medication 4 MILLIGRAM(S): at 06:04

## 2022-01-01 RX ADMIN — DIVALPROEX SODIUM 500 MILLIGRAM(S): 500 TABLET, DELAYED RELEASE ORAL at 20:52

## 2022-01-01 RX ADMIN — CARVEDILOL PHOSPHATE 3.12 MILLIGRAM(S): 80 CAPSULE, EXTENDED RELEASE ORAL at 06:10

## 2022-01-01 RX ADMIN — Medication 1: at 17:47

## 2022-01-01 RX ADMIN — SENNA PLUS 10 MILLILITER(S): 8.6 TABLET ORAL at 14:09

## 2022-01-01 RX ADMIN — HEPARIN SODIUM 5000 UNIT(S): 5000 INJECTION INTRAVENOUS; SUBCUTANEOUS at 17:09

## 2022-01-01 RX ADMIN — Medication 7 UNIT(S): at 18:48

## 2022-01-01 RX ADMIN — Medication 1 UNIT(S): at 08:55

## 2022-01-01 RX ADMIN — Medication 10 MILLIGRAM(S): at 01:34

## 2022-01-01 RX ADMIN — SEVELAMER CARBONATE 800 MILLIGRAM(S): 2400 POWDER, FOR SUSPENSION ORAL at 12:00

## 2022-01-01 RX ADMIN — TRAMADOL HYDROCHLORIDE 25 MILLIGRAM(S): 50 TABLET ORAL at 23:21

## 2022-01-01 RX ADMIN — BRIVARACETAM 50 MILLIGRAM(S): 25 TABLET, FILM COATED ORAL at 08:43

## 2022-01-01 RX ADMIN — SODIUM CHLORIDE 50 MILLILITER(S): 9 INJECTION, SOLUTION INTRAVENOUS at 16:46

## 2022-01-01 RX ADMIN — Medication 3 MILLIGRAM(S): at 21:58

## 2022-01-01 RX ADMIN — Medication 100 MILLIGRAM(S): at 20:50

## 2022-01-01 RX ADMIN — Medication 10 MILLIGRAM(S): at 01:37

## 2022-01-01 RX ADMIN — Medication 2 DROP(S): at 17:39

## 2022-01-01 RX ADMIN — Medication 4 MILLIGRAM(S): at 23:52

## 2022-01-01 RX ADMIN — Medication 4: at 11:40

## 2022-01-01 RX ADMIN — PANTOPRAZOLE SODIUM 40 MILLIGRAM(S): 20 TABLET, DELAYED RELEASE ORAL at 12:54

## 2022-01-01 RX ADMIN — VALGANCICLOVIR 450 MILLIGRAM(S): 450 TABLET, FILM COATED ORAL at 11:28

## 2022-01-01 RX ADMIN — LEVETIRACETAM 250 MILLIGRAM(S): 250 TABLET, FILM COATED ORAL at 17:39

## 2022-01-01 RX ADMIN — FOSPHENYTOIN 104 MILLIGRAM(S) PE: 50 INJECTION INTRAMUSCULAR; INTRAVENOUS at 23:09

## 2022-01-01 RX ADMIN — Medication 2 DROP(S): at 12:56

## 2022-01-01 RX ADMIN — BRIVARACETAM 50 MILLIGRAM(S): 25 TABLET, FILM COATED ORAL at 21:20

## 2022-01-01 RX ADMIN — VALGANCICLOVIR 450 MILLIGRAM(S): 450 TABLET, FILM COATED ORAL at 12:01

## 2022-01-01 RX ADMIN — GANCICLOVIR SODIUM 100 MILLIGRAM(S): 50 INJECTION, POWDER, LYOPHILIZED, FOR SOLUTION INTRAVENTRICULAR at 16:13

## 2022-01-01 RX ADMIN — Medication 80 MILLIGRAM(S): at 10:56

## 2022-01-01 RX ADMIN — Medication 10 MILLIGRAM(S): at 05:21

## 2022-01-01 RX ADMIN — Medication 4 MILLIGRAM(S): at 05:48

## 2022-01-01 RX ADMIN — Medication 4 MILLIGRAM(S): at 17:33

## 2022-01-01 RX ADMIN — Medication 975 MILLIGRAM(S): at 17:36

## 2022-01-01 RX ADMIN — POLYETHYLENE GLYCOL 3350 17 GRAM(S): 17 POWDER, FOR SOLUTION ORAL at 13:05

## 2022-01-01 RX ADMIN — SENNA PLUS 2 TABLET(S): 8.6 TABLET ORAL at 21:11

## 2022-01-01 RX ADMIN — Medication 100 MILLIGRAM(S): at 12:55

## 2022-01-01 RX ADMIN — DIVALPROEX SODIUM 500 MILLIGRAM(S): 500 TABLET, DELAYED RELEASE ORAL at 20:09

## 2022-01-01 RX ADMIN — MYCOPHENOLATE MOFETIL 250 MILLIGRAM(S): 250 CAPSULE ORAL at 20:12

## 2022-01-01 RX ADMIN — SIROLIMUS 7 MILLIGRAM(S): 1 SOLUTION ORAL at 08:05

## 2022-01-01 RX ADMIN — FINASTERIDE 5 MILLIGRAM(S): 5 TABLET, FILM COATED ORAL at 13:35

## 2022-01-01 RX ADMIN — ERYTHROPOIETIN 10000 UNIT(S): 10000 INJECTION, SOLUTION INTRAVENOUS; SUBCUTANEOUS at 22:07

## 2022-01-01 RX ADMIN — SODIUM CHLORIDE 125 MILLILITER(S): 9 INJECTION INTRAMUSCULAR; INTRAVENOUS; SUBCUTANEOUS at 13:21

## 2022-01-01 RX ADMIN — FOSPHENYTOIN 108 MILLIGRAM(S) PE: 50 INJECTION INTRAMUSCULAR; INTRAVENOUS at 05:18

## 2022-01-01 RX ADMIN — Medication 2 DROP(S): at 23:59

## 2022-01-01 RX ADMIN — CHLORHEXIDINE GLUCONATE 1 APPLICATION(S): 213 SOLUTION TOPICAL at 05:39

## 2022-01-01 RX ADMIN — Medication 2 DROP(S): at 12:00

## 2022-01-01 RX ADMIN — Medication 40 MILLIGRAM(S): at 05:28

## 2022-01-01 RX ADMIN — Medication 500000 UNIT(S): at 12:28

## 2022-01-01 RX ADMIN — PANTOPRAZOLE SODIUM 40 MILLIGRAM(S): 20 TABLET, DELAYED RELEASE ORAL at 10:03

## 2022-01-01 RX ADMIN — HEPARIN SODIUM 5000 UNIT(S): 5000 INJECTION INTRAVENOUS; SUBCUTANEOUS at 05:13

## 2022-01-01 RX ADMIN — INSULIN GLARGINE 32 UNIT(S): 100 INJECTION, SOLUTION SUBCUTANEOUS at 17:10

## 2022-01-01 RX ADMIN — AMLODIPINE BESYLATE 10 MILLIGRAM(S): 2.5 TABLET ORAL at 05:24

## 2022-01-01 RX ADMIN — Medication 1 MILLIGRAM(S): at 20:39

## 2022-01-01 RX ADMIN — HEPARIN SODIUM 5000 UNIT(S): 5000 INJECTION INTRAVENOUS; SUBCUTANEOUS at 05:20

## 2022-01-01 RX ADMIN — CARVEDILOL PHOSPHATE 25 MILLIGRAM(S): 80 CAPSULE, EXTENDED RELEASE ORAL at 17:29

## 2022-01-01 RX ADMIN — Medication 1 TABLET(S): at 13:56

## 2022-01-01 RX ADMIN — PIPERACILLIN AND TAZOBACTAM 200 GRAM(S): 4; .5 INJECTION, POWDER, LYOPHILIZED, FOR SOLUTION INTRAVENOUS at 16:44

## 2022-01-01 RX ADMIN — Medication 2 DROP(S): at 05:22

## 2022-01-01 RX ADMIN — PANTOPRAZOLE SODIUM 40 MILLIGRAM(S): 20 TABLET, DELAYED RELEASE ORAL at 11:37

## 2022-01-01 RX ADMIN — ERYTHROPOIETIN 10000 UNIT(S): 10000 INJECTION, SOLUTION INTRAVENOUS; SUBCUTANEOUS at 13:07

## 2022-01-01 RX ADMIN — Medication 4 MILLIGRAM(S): at 06:56

## 2022-01-01 RX ADMIN — Medication 40 MILLIEQUIVALENT(S): at 02:46

## 2022-01-01 RX ADMIN — MYCOPHENOLATE MOFETIL 250 MILLIGRAM(S): 250 CAPSULE ORAL at 20:24

## 2022-01-01 RX ADMIN — Medication 500 MILLIGRAM(S): at 05:25

## 2022-01-01 RX ADMIN — Medication 25 GRAM(S): at 02:45

## 2022-01-01 RX ADMIN — PANTOPRAZOLE SODIUM 40 MILLIGRAM(S): 20 TABLET, DELAYED RELEASE ORAL at 05:46

## 2022-01-01 RX ADMIN — Medication 200 MILLIGRAM(S): at 17:52

## 2022-01-01 RX ADMIN — MYCOPHENOLATE MOFETIL 1 GRAM(S): 250 CAPSULE ORAL at 20:13

## 2022-01-01 RX ADMIN — DIVALPROEX SODIUM 250 MILLIGRAM(S): 500 TABLET, DELAYED RELEASE ORAL at 12:06

## 2022-01-01 RX ADMIN — MYCOPHENOLATE MOFETIL 500 MILLIGRAM(S): 250 CAPSULE ORAL at 17:01

## 2022-01-01 RX ADMIN — INSULIN GLARGINE 5 UNIT(S): 100 INJECTION, SOLUTION SUBCUTANEOUS at 21:56

## 2022-01-01 RX ADMIN — SIROLIMUS 4 MILLIGRAM(S): 1 SOLUTION ORAL at 08:23

## 2022-01-01 RX ADMIN — Medication 650 MILLIGRAM(S): at 21:37

## 2022-01-01 RX ADMIN — MYCOPHENOLATE MOFETIL 500 MILLIGRAM(S): 250 CAPSULE ORAL at 09:13

## 2022-01-01 RX ADMIN — MYCOPHENOLATE MOFETIL 1000 MILLIGRAM(S): 250 CAPSULE ORAL at 20:55

## 2022-01-01 RX ADMIN — Medication 10 MILLIGRAM(S): at 22:50

## 2022-01-01 RX ADMIN — Medication 4: at 08:48

## 2022-01-01 RX ADMIN — FINASTERIDE 5 MILLIGRAM(S): 5 TABLET, FILM COATED ORAL at 11:42

## 2022-01-01 RX ADMIN — Medication 1 TABLET(S): at 11:30

## 2022-01-01 RX ADMIN — Medication 8 UNIT(S): at 13:17

## 2022-01-01 RX ADMIN — Medication 75 MILLIGRAM(S): at 21:14

## 2022-01-01 RX ADMIN — POLYETHYLENE GLYCOL 3350 17 GRAM(S): 17 POWDER, FOR SOLUTION ORAL at 11:49

## 2022-01-01 RX ADMIN — Medication 4 UNIT(S): at 13:19

## 2022-01-01 RX ADMIN — Medication 4 MILLIGRAM(S): at 17:43

## 2022-01-01 RX ADMIN — Medication 50 MICROGRAM(S): at 05:44

## 2022-01-01 RX ADMIN — FINASTERIDE 5 MILLIGRAM(S): 5 TABLET, FILM COATED ORAL at 12:32

## 2022-01-01 RX ADMIN — CARVEDILOL PHOSPHATE 25 MILLIGRAM(S): 80 CAPSULE, EXTENDED RELEASE ORAL at 18:11

## 2022-01-01 RX ADMIN — Medication 7 UNIT(S): at 13:20

## 2022-01-01 RX ADMIN — Medication 2 DROP(S): at 23:37

## 2022-01-01 RX ADMIN — Medication 5 MILLIGRAM(S): at 05:57

## 2022-01-01 RX ADMIN — FOSPHENYTOIN 104 MILLIGRAM(S) PE: 50 INJECTION INTRAMUSCULAR; INTRAVENOUS at 17:29

## 2022-01-01 RX ADMIN — Medication 2: at 21:54

## 2022-01-01 RX ADMIN — Medication 1: at 08:36

## 2022-01-01 RX ADMIN — BRIVARACETAM 50 MILLIGRAM(S): 25 TABLET, FILM COATED ORAL at 05:32

## 2022-01-01 RX ADMIN — Medication 200 MILLIGRAM(S): at 06:01

## 2022-01-01 RX ADMIN — Medication 50 MILLIGRAM(S): at 21:14

## 2022-01-01 RX ADMIN — SENNA PLUS 10 MILLILITER(S): 8.6 TABLET ORAL at 22:51

## 2022-01-01 RX ADMIN — Medication 100 MILLIGRAM(S): at 05:10

## 2022-01-01 RX ADMIN — CHLORHEXIDINE GLUCONATE 1 APPLICATION(S): 213 SOLUTION TOPICAL at 11:23

## 2022-01-01 RX ADMIN — Medication 10 MILLIGRAM(S): at 23:46

## 2022-01-01 RX ADMIN — LEVETIRACETAM 250 MILLIGRAM(S): 250 TABLET, FILM COATED ORAL at 05:52

## 2022-01-01 RX ADMIN — Medication 100 MILLIGRAM(S): at 12:25

## 2022-01-01 RX ADMIN — Medication 100 MILLIGRAM(S): at 06:08

## 2022-01-01 RX ADMIN — Medication 4 UNIT(S): at 17:10

## 2022-01-01 RX ADMIN — Medication 2 DROP(S): at 05:03

## 2022-01-01 RX ADMIN — Medication 4 MILLIGRAM(S): at 06:11

## 2022-01-01 RX ADMIN — Medication 10 MILLIGRAM(S): at 05:27

## 2022-01-01 RX ADMIN — FINASTERIDE 5 MILLIGRAM(S): 5 TABLET, FILM COATED ORAL at 13:14

## 2022-01-01 RX ADMIN — MAGNESIUM OXIDE 400 MG ORAL TABLET 400 MILLIGRAM(S): 241.3 TABLET ORAL at 22:07

## 2022-01-01 RX ADMIN — LEVETIRACETAM 250 MILLIGRAM(S): 250 TABLET, FILM COATED ORAL at 05:08

## 2022-01-01 RX ADMIN — MYCOPHENOLATE MOFETIL 500 MILLIGRAM(S): 250 CAPSULE ORAL at 22:03

## 2022-01-01 RX ADMIN — LACOSAMIDE 100 MILLIGRAM(S): 50 TABLET ORAL at 20:14

## 2022-01-01 RX ADMIN — POLYETHYLENE GLYCOL 3350 17 GRAM(S): 17 POWDER, FOR SOLUTION ORAL at 23:44

## 2022-01-01 RX ADMIN — Medication 25 MILLIGRAM(S): at 01:29

## 2022-01-01 RX ADMIN — VALGANCICLOVIR 450 MILLIGRAM(S): 450 TABLET, FILM COATED ORAL at 12:51

## 2022-01-01 RX ADMIN — Medication 650 MILLIGRAM(S): at 23:00

## 2022-01-01 RX ADMIN — Medication 2 DROP(S): at 12:23

## 2022-01-01 RX ADMIN — MYCOPHENOLATE MOFETIL 250 MILLIGRAM(S): 250 CAPSULE ORAL at 20:22

## 2022-01-01 RX ADMIN — HEPARIN SODIUM 11 UNIT(S)/HR: 5000 INJECTION INTRAVENOUS; SUBCUTANEOUS at 08:54

## 2022-01-01 RX ADMIN — SENNA PLUS 2 TABLET(S): 8.6 TABLET ORAL at 22:05

## 2022-01-01 RX ADMIN — PANTOPRAZOLE SODIUM 40 MILLIGRAM(S): 20 TABLET, DELAYED RELEASE ORAL at 05:14

## 2022-01-01 RX ADMIN — Medication 1 TABLET(S): at 11:45

## 2022-01-01 RX ADMIN — PANTOPRAZOLE SODIUM 40 MILLIGRAM(S): 20 TABLET, DELAYED RELEASE ORAL at 11:46

## 2022-01-01 RX ADMIN — FINASTERIDE 5 MILLIGRAM(S): 5 TABLET, FILM COATED ORAL at 13:22

## 2022-01-01 RX ADMIN — Medication 1: at 08:10

## 2022-01-01 RX ADMIN — MYCOPHENOLATE MOFETIL 500 MILLIGRAM(S): 250 CAPSULE ORAL at 19:56

## 2022-01-01 RX ADMIN — Medication 3 UNIT(S): at 13:21

## 2022-01-01 RX ADMIN — FLUCONAZOLE 200 MILLIGRAM(S): 150 TABLET ORAL at 11:44

## 2022-01-01 RX ADMIN — Medication 40 MICROGRAM(S): at 21:38

## 2022-01-01 RX ADMIN — Medication 2 DROP(S): at 00:35

## 2022-01-01 RX ADMIN — ATORVASTATIN CALCIUM 40 MILLIGRAM(S): 80 TABLET, FILM COATED ORAL at 22:50

## 2022-01-01 RX ADMIN — Medication 50 MICROGRAM(S): at 05:21

## 2022-01-01 RX ADMIN — ATORVASTATIN CALCIUM 40 MILLIGRAM(S): 80 TABLET, FILM COATED ORAL at 21:29

## 2022-01-01 RX ADMIN — Medication 3 UNIT(S): at 17:17

## 2022-01-01 RX ADMIN — Medication 650 MILLIGRAM(S): at 11:54

## 2022-01-01 RX ADMIN — DIVALPROEX SODIUM 250 MILLIGRAM(S): 500 TABLET, DELAYED RELEASE ORAL at 11:36

## 2022-01-01 RX ADMIN — Medication 25 MILLIGRAM(S): at 21:29

## 2022-01-01 RX ADMIN — Medication 650 MILLIGRAM(S): at 07:57

## 2022-01-01 RX ADMIN — VALGANCICLOVIR 450 MILLIGRAM(S): 450 TABLET, FILM COATED ORAL at 11:58

## 2022-01-01 RX ADMIN — SENNA PLUS 2 TABLET(S): 8.6 TABLET ORAL at 21:55

## 2022-01-01 RX ADMIN — SIROLIMUS 6 MILLIGRAM(S): 1 SOLUTION ORAL at 09:02

## 2022-01-01 RX ADMIN — Medication 200 MILLIGRAM(S): at 17:31

## 2022-01-01 RX ADMIN — VALGANCICLOVIR 450 MILLIGRAM(S): 450 TABLET, FILM COATED ORAL at 07:41

## 2022-01-01 RX ADMIN — Medication 2 DROP(S): at 21:30

## 2022-01-01 RX ADMIN — Medication 50 MICROGRAM(S): at 06:23

## 2022-01-01 RX ADMIN — MYCOPHENOLATE MOFETIL 1000 MILLIGRAM(S): 250 CAPSULE ORAL at 08:04

## 2022-01-01 RX ADMIN — Medication 10 UNIT(S): at 09:29

## 2022-01-01 RX ADMIN — Medication 500000 UNIT(S): at 17:38

## 2022-01-01 RX ADMIN — SIROLIMUS 5 MILLIGRAM(S): 1 SOLUTION ORAL at 08:01

## 2022-01-01 RX ADMIN — Medication 500000 UNIT(S): at 01:05

## 2022-01-01 RX ADMIN — Medication 3: at 17:36

## 2022-01-01 RX ADMIN — Medication 40 MILLIGRAM(S): at 11:35

## 2022-01-01 RX ADMIN — Medication 4 MILLIGRAM(S): at 05:57

## 2022-01-01 RX ADMIN — SEVELAMER CARBONATE 800 MILLIGRAM(S): 2400 POWDER, FOR SUSPENSION ORAL at 17:32

## 2022-01-01 RX ADMIN — Medication 4 MILLIGRAM(S): at 05:45

## 2022-01-01 RX ADMIN — Medication 80 MILLIGRAM(S): at 05:48

## 2022-01-01 RX ADMIN — ATORVASTATIN CALCIUM 40 MILLIGRAM(S): 80 TABLET, FILM COATED ORAL at 22:27

## 2022-01-01 RX ADMIN — Medication 500000 UNIT(S): at 21:52

## 2022-01-01 RX ADMIN — Medication 4 MILLIGRAM(S): at 18:38

## 2022-01-01 RX ADMIN — Medication 1 TABLET(S): at 12:13

## 2022-01-01 RX ADMIN — WARFARIN SODIUM 10 MILLIGRAM(S): 2.5 TABLET ORAL at 21:42

## 2022-01-01 RX ADMIN — ENOXAPARIN SODIUM 60 MILLIGRAM(S): 100 INJECTION SUBCUTANEOUS at 17:20

## 2022-01-01 RX ADMIN — Medication 500000 UNIT(S): at 12:17

## 2022-01-01 RX ADMIN — INSULIN GLARGINE 6 UNIT(S): 100 INJECTION, SOLUTION SUBCUTANEOUS at 22:21

## 2022-01-01 RX ADMIN — Medication 100 MILLIGRAM(S): at 21:26

## 2022-01-01 RX ADMIN — POLYETHYLENE GLYCOL 3350 17 GRAM(S): 17 POWDER, FOR SOLUTION ORAL at 13:28

## 2022-01-01 RX ADMIN — Medication 1: at 08:16

## 2022-01-01 RX ADMIN — Medication 500000 UNIT(S): at 05:03

## 2022-01-01 RX ADMIN — MYCOPHENOLATE MOFETIL 500 MILLIGRAM(S): 250 CAPSULE ORAL at 08:44

## 2022-01-01 RX ADMIN — LEVETIRACETAM 250 MILLIGRAM(S): 250 TABLET, FILM COATED ORAL at 06:07

## 2022-01-01 RX ADMIN — Medication 80 MILLIGRAM(S): at 05:20

## 2022-01-01 RX ADMIN — Medication 6 MILLIGRAM(S): at 22:12

## 2022-01-01 RX ADMIN — Medication 100 MILLIGRAM(S): at 06:00

## 2022-01-01 RX ADMIN — INSULIN GLARGINE 5 UNIT(S): 100 INJECTION, SOLUTION SUBCUTANEOUS at 21:40

## 2022-01-01 RX ADMIN — Medication 500000 UNIT(S): at 00:14

## 2022-01-01 RX ADMIN — CHLORHEXIDINE GLUCONATE 1 APPLICATION(S): 213 SOLUTION TOPICAL at 06:24

## 2022-01-01 RX ADMIN — SENNA PLUS 2 TABLET(S): 8.6 TABLET ORAL at 22:13

## 2022-01-01 RX ADMIN — AMLODIPINE BESYLATE 10 MILLIGRAM(S): 2.5 TABLET ORAL at 05:35

## 2022-01-01 RX ADMIN — Medication 1 TABLET(S): at 12:16

## 2022-01-01 RX ADMIN — AMLODIPINE BESYLATE 10 MILLIGRAM(S): 2.5 TABLET ORAL at 05:45

## 2022-01-01 RX ADMIN — MYCOPHENOLATE MOFETIL 1000 MILLIGRAM(S): 250 CAPSULE ORAL at 20:27

## 2022-01-01 RX ADMIN — SIROLIMUS 7 MILLIGRAM(S): 1 SOLUTION ORAL at 08:33

## 2022-01-01 RX ADMIN — MYCOPHENOLATE MOFETIL 250 MILLIGRAM(S): 250 CAPSULE ORAL at 20:16

## 2022-01-01 RX ADMIN — BRIVARACETAM 50 MILLIGRAM(S): 25 TABLET, FILM COATED ORAL at 06:16

## 2022-01-01 RX ADMIN — Medication 1300 MILLIGRAM(S): at 18:19

## 2022-01-01 RX ADMIN — Medication 10 MILLIGRAM(S): at 18:54

## 2022-01-01 RX ADMIN — VALGANCICLOVIR 450 MILLIGRAM(S): 450 TABLET, FILM COATED ORAL at 11:56

## 2022-01-01 RX ADMIN — MYCOPHENOLATE MOFETIL 1000 MILLIGRAM(S): 250 CAPSULE ORAL at 13:01

## 2022-01-01 RX ADMIN — Medication 5 MILLIGRAM(S): at 05:03

## 2022-01-01 RX ADMIN — FINASTERIDE 5 MILLIGRAM(S): 5 TABLET, FILM COATED ORAL at 12:01

## 2022-01-01 RX ADMIN — WARFARIN SODIUM 5 MILLIGRAM(S): 2.5 TABLET ORAL at 21:02

## 2022-01-01 RX ADMIN — Medication 100 MILLIGRAM(S): at 05:26

## 2022-01-01 RX ADMIN — Medication 4 MILLIGRAM(S): at 17:57

## 2022-01-01 RX ADMIN — Medication 63.75 MILLIMOLE(S): at 08:43

## 2022-01-01 RX ADMIN — FINASTERIDE 5 MILLIGRAM(S): 5 TABLET, FILM COATED ORAL at 12:37

## 2022-01-01 RX ADMIN — CARVEDILOL PHOSPHATE 25 MILLIGRAM(S): 80 CAPSULE, EXTENDED RELEASE ORAL at 08:12

## 2022-01-01 RX ADMIN — ATORVASTATIN CALCIUM 40 MILLIGRAM(S): 80 TABLET, FILM COATED ORAL at 20:17

## 2022-01-01 RX ADMIN — SEVELAMER CARBONATE 800 MILLIGRAM(S): 2400 POWDER, FOR SUSPENSION ORAL at 08:05

## 2022-01-01 RX ADMIN — Medication 10 MILLIGRAM(S): at 05:44

## 2022-01-01 RX ADMIN — AMLODIPINE BESYLATE 10 MILLIGRAM(S): 2.5 TABLET ORAL at 07:11

## 2022-01-01 RX ADMIN — SEVELAMER CARBONATE 800 MILLIGRAM(S): 2400 POWDER, FOR SUSPENSION ORAL at 08:57

## 2022-01-01 RX ADMIN — BRIVARACETAM 50 MILLIGRAM(S): 25 TABLET, FILM COATED ORAL at 18:31

## 2022-01-01 RX ADMIN — Medication 500000 UNIT(S): at 13:56

## 2022-01-01 RX ADMIN — CARVEDILOL PHOSPHATE 12.5 MILLIGRAM(S): 80 CAPSULE, EXTENDED RELEASE ORAL at 17:40

## 2022-01-01 RX ADMIN — Medication 10 MILLIGRAM(S): at 05:48

## 2022-01-01 RX ADMIN — LACOSAMIDE 100 MILLIGRAM(S): 50 TABLET ORAL at 20:24

## 2022-01-01 RX ADMIN — Medication 1: at 09:01

## 2022-01-01 RX ADMIN — Medication 650 MILLIGRAM(S): at 05:46

## 2022-01-01 RX ADMIN — WARFARIN SODIUM 5 MILLIGRAM(S): 2.5 TABLET ORAL at 22:07

## 2022-01-01 RX ADMIN — Medication 500000 UNIT(S): at 05:29

## 2022-01-01 RX ADMIN — ERYTHROPOIETIN 10000 UNIT(S): 10000 INJECTION, SOLUTION INTRAVENOUS; SUBCUTANEOUS at 13:26

## 2022-01-01 RX ADMIN — Medication 650 MILLIGRAM(S): at 00:15

## 2022-01-01 RX ADMIN — Medication 5 UNIT(S): at 17:34

## 2022-01-01 RX ADMIN — Medication 80 MILLIGRAM(S): at 20:54

## 2022-01-01 RX ADMIN — MYCOPHENOLATE MOFETIL 500 MILLIGRAM(S): 250 CAPSULE ORAL at 19:48

## 2022-01-01 RX ADMIN — Medication 5 MILLIGRAM(S): at 05:27

## 2022-01-01 RX ADMIN — SIROLIMUS 7 MILLIGRAM(S): 1 SOLUTION ORAL at 08:16

## 2022-01-01 RX ADMIN — Medication 100 MILLIGRAM(S): at 21:37

## 2022-01-01 RX ADMIN — FLUCONAZOLE 100 MILLIGRAM(S): 150 TABLET ORAL at 08:18

## 2022-01-01 RX ADMIN — Medication 5 MILLIGRAM(S): at 22:55

## 2022-01-01 RX ADMIN — Medication 1300 MILLIGRAM(S): at 06:00

## 2022-01-01 RX ADMIN — FLUCONAZOLE 200 MILLIGRAM(S): 150 TABLET ORAL at 12:03

## 2022-01-01 RX ADMIN — Medication 1: at 12:44

## 2022-01-01 RX ADMIN — Medication 4: at 08:55

## 2022-01-01 RX ADMIN — MYCOPHENOLATE MOFETIL 1 GRAM(S): 250 CAPSULE ORAL at 21:12

## 2022-01-01 RX ADMIN — Medication 200 MILLIGRAM(S): at 17:57

## 2022-01-01 RX ADMIN — Medication 2: at 08:56

## 2022-01-01 RX ADMIN — Medication 4 UNIT(S): at 08:56

## 2022-01-01 RX ADMIN — Medication 60 MILLIGRAM(S): at 21:57

## 2022-01-01 RX ADMIN — CARVEDILOL PHOSPHATE 25 MILLIGRAM(S): 80 CAPSULE, EXTENDED RELEASE ORAL at 05:10

## 2022-01-01 RX ADMIN — Medication 4 MILLIGRAM(S): at 05:30

## 2022-01-01 RX ADMIN — Medication 3 TABLET(S): at 04:33

## 2022-01-01 RX ADMIN — SEVELAMER CARBONATE 800 MILLIGRAM(S): 2400 POWDER, FOR SUSPENSION ORAL at 17:28

## 2022-01-01 RX ADMIN — SODIUM CHLORIDE 100 MILLILITER(S): 9 INJECTION INTRAMUSCULAR; INTRAVENOUS; SUBCUTANEOUS at 18:21

## 2022-01-01 RX ADMIN — VALGANCICLOVIR 450 MILLIGRAM(S): 450 TABLET, FILM COATED ORAL at 12:13

## 2022-01-01 RX ADMIN — MAGNESIUM OXIDE 400 MG ORAL TABLET 400 MILLIGRAM(S): 241.3 TABLET ORAL at 14:22

## 2022-01-01 RX ADMIN — Medication 10 MILLIGRAM(S): at 05:16

## 2022-01-01 RX ADMIN — ATOVAQUONE 1500 MILLIGRAM(S): 750 SUSPENSION ORAL at 07:43

## 2022-01-01 RX ADMIN — FOSPHENYTOIN 108 MILLIGRAM(S) PE: 50 INJECTION INTRAMUSCULAR; INTRAVENOUS at 06:10

## 2022-01-01 RX ADMIN — Medication 1: at 18:17

## 2022-01-01 RX ADMIN — Medication 50 MICROGRAM(S): at 05:33

## 2022-01-01 RX ADMIN — SEVELAMER CARBONATE 800 MILLIGRAM(S): 2400 POWDER, FOR SUSPENSION ORAL at 12:45

## 2022-01-01 RX ADMIN — Medication 500000 UNIT(S): at 11:11

## 2022-01-01 RX ADMIN — Medication 60 MILLIGRAM(S): at 06:23

## 2022-01-01 RX ADMIN — PANTOPRAZOLE SODIUM 40 MILLIGRAM(S): 20 TABLET, DELAYED RELEASE ORAL at 09:34

## 2022-01-01 RX ADMIN — Medication 500000 UNIT(S): at 13:07

## 2022-01-01 RX ADMIN — Medication 100 MILLIGRAM(S): at 05:40

## 2022-01-01 RX ADMIN — CHLORHEXIDINE GLUCONATE 1 APPLICATION(S): 213 SOLUTION TOPICAL at 09:09

## 2022-01-01 RX ADMIN — BRIVARACETAM 50 MILLIGRAM(S): 25 TABLET, FILM COATED ORAL at 09:10

## 2022-01-01 RX ADMIN — Medication 4: at 09:07

## 2022-01-01 RX ADMIN — Medication 2 DROP(S): at 06:18

## 2022-01-01 RX ADMIN — Medication 1 TABLET(S): at 13:08

## 2022-01-01 RX ADMIN — Medication 2: at 17:39

## 2022-01-01 RX ADMIN — Medication 50 MICROGRAM(S): at 05:32

## 2022-01-01 RX ADMIN — AMLODIPINE BESYLATE 10 MILLIGRAM(S): 2.5 TABLET ORAL at 06:08

## 2022-01-01 RX ADMIN — SODIUM CHLORIDE 50 MILLILITER(S): 9 INJECTION, SOLUTION INTRAVENOUS at 05:15

## 2022-01-01 RX ADMIN — Medication 250 MILLIGRAM(S): at 13:19

## 2022-01-01 RX ADMIN — Medication 1300 MILLIGRAM(S): at 18:26

## 2022-01-01 RX ADMIN — Medication 2 DROP(S): at 05:26

## 2022-01-01 RX ADMIN — SEVELAMER CARBONATE 800 MILLIGRAM(S): 2400 POWDER, FOR SUSPENSION ORAL at 17:35

## 2022-01-01 RX ADMIN — Medication 75 MILLIGRAM(S): at 06:16

## 2022-01-01 RX ADMIN — HYDROMORPHONE HYDROCHLORIDE 0.5 MILLIGRAM(S): 2 INJECTION INTRAMUSCULAR; INTRAVENOUS; SUBCUTANEOUS at 09:45

## 2022-01-01 RX ADMIN — Medication 1 TABLET(S): at 13:05

## 2022-01-01 RX ADMIN — Medication 4: at 13:36

## 2022-01-01 RX ADMIN — CEFEPIME 100 MILLIGRAM(S): 1 INJECTION, POWDER, FOR SOLUTION INTRAMUSCULAR; INTRAVENOUS at 21:51

## 2022-01-01 RX ADMIN — PERAMPANEL 4 MILLIGRAM(S): 2 TABLET ORAL at 21:38

## 2022-01-01 RX ADMIN — TACROLIMUS 0.5 MILLIGRAM(S): 5 CAPSULE ORAL at 14:06

## 2022-01-01 RX ADMIN — Medication 1: at 00:14

## 2022-01-01 RX ADMIN — Medication 2 DROP(S): at 17:51

## 2022-01-01 RX ADMIN — Medication 500000 UNIT(S): at 11:39

## 2022-01-01 RX ADMIN — Medication 4 UNIT(S): at 09:24

## 2022-01-01 RX ADMIN — INSULIN GLARGINE 7 UNIT(S): 100 INJECTION, SOLUTION SUBCUTANEOUS at 21:25

## 2022-01-01 RX ADMIN — Medication 4 MILLIGRAM(S): at 05:14

## 2022-01-01 RX ADMIN — MAGNESIUM OXIDE 400 MG ORAL TABLET 400 MILLIGRAM(S): 241.3 TABLET ORAL at 12:35

## 2022-01-01 RX ADMIN — SIROLIMUS 7 MILLIGRAM(S): 1 SOLUTION ORAL at 08:08

## 2022-01-01 RX ADMIN — Medication 10 MILLIGRAM(S): at 00:30

## 2022-01-01 RX ADMIN — BRIVARACETAM 50 MILLIGRAM(S): 25 TABLET, FILM COATED ORAL at 22:18

## 2022-01-01 RX ADMIN — Medication 1: at 21:43

## 2022-01-01 RX ADMIN — Medication 100 MILLIGRAM(S): at 05:58

## 2022-01-01 RX ADMIN — Medication 1: at 08:32

## 2022-01-01 RX ADMIN — BRIVARACETAM 50 MILLIGRAM(S): 25 TABLET, FILM COATED ORAL at 17:47

## 2022-01-01 RX ADMIN — Medication 1300 MILLIGRAM(S): at 06:08

## 2022-01-01 RX ADMIN — Medication 5.78 MICROGRAM(S)/KG/MIN: at 18:19

## 2022-01-01 RX ADMIN — Medication 4: at 12:47

## 2022-01-01 RX ADMIN — LEVETIRACETAM 250 MILLIGRAM(S): 250 TABLET, FILM COATED ORAL at 06:36

## 2022-01-01 RX ADMIN — Medication 4: at 00:54

## 2022-01-01 RX ADMIN — Medication 20 MILLIGRAM(S): at 05:51

## 2022-01-01 RX ADMIN — Medication 80 MILLIGRAM(S): at 05:27

## 2022-01-01 RX ADMIN — MAGNESIUM OXIDE 400 MG ORAL TABLET 400 MILLIGRAM(S): 241.3 TABLET ORAL at 12:04

## 2022-01-01 RX ADMIN — Medication 2 DROP(S): at 05:18

## 2022-01-01 RX ADMIN — Medication 4: at 13:26

## 2022-01-01 RX ADMIN — CARVEDILOL PHOSPHATE 12.5 MILLIGRAM(S): 80 CAPSULE, EXTENDED RELEASE ORAL at 05:01

## 2022-01-01 RX ADMIN — SIROLIMUS 1 MILLIGRAM(S): 1 SOLUTION ORAL at 11:31

## 2022-01-01 RX ADMIN — Medication 500 MILLIGRAM(S): at 17:48

## 2022-01-01 RX ADMIN — SEVELAMER CARBONATE 800 MILLIGRAM(S): 2400 POWDER, FOR SUSPENSION ORAL at 22:00

## 2022-01-01 RX ADMIN — Medication 1300 MILLIGRAM(S): at 18:02

## 2022-01-01 RX ADMIN — BRIVARACETAM 50 MILLIGRAM(S): 25 TABLET, FILM COATED ORAL at 06:33

## 2022-01-01 RX ADMIN — Medication 80 MILLIGRAM(S): at 12:36

## 2022-01-01 RX ADMIN — Medication 25 MILLIGRAM(S): at 17:33

## 2022-01-01 RX ADMIN — Medication 4 UNIT(S): at 12:38

## 2022-01-01 RX ADMIN — Medication 2 DROP(S): at 11:40

## 2022-01-01 RX ADMIN — FINASTERIDE 5 MILLIGRAM(S): 5 TABLET, FILM COATED ORAL at 12:52

## 2022-01-01 RX ADMIN — HEPARIN SODIUM 5000 UNIT(S): 5000 INJECTION INTRAVENOUS; SUBCUTANEOUS at 11:39

## 2022-01-01 RX ADMIN — IRON SUCROSE 200 MILLIGRAM(S): 20 INJECTION, SOLUTION INTRAVENOUS at 15:11

## 2022-01-01 RX ADMIN — PERAMPANEL 4 MILLIGRAM(S): 2 TABLET ORAL at 21:25

## 2022-01-01 RX ADMIN — Medication 4 MILLIGRAM(S): at 20:50

## 2022-01-01 RX ADMIN — Medication 500000 UNIT(S): at 22:57

## 2022-01-01 RX ADMIN — Medication 6 MILLIGRAM(S): at 21:45

## 2022-01-01 RX ADMIN — SIROLIMUS 7 MILLIGRAM(S): 1 SOLUTION ORAL at 08:34

## 2022-01-01 RX ADMIN — FOSPHENYTOIN 104 MILLIGRAM(S) PE: 50 INJECTION INTRAMUSCULAR; INTRAVENOUS at 20:11

## 2022-01-01 RX ADMIN — SEVELAMER CARBONATE 800 MILLIGRAM(S): 2400 POWDER, FOR SUSPENSION ORAL at 12:26

## 2022-01-01 RX ADMIN — Medication 2: at 13:14

## 2022-01-01 RX ADMIN — Medication 1: at 07:56

## 2022-01-01 RX ADMIN — PANTOPRAZOLE SODIUM 40 MILLIGRAM(S): 20 TABLET, DELAYED RELEASE ORAL at 12:56

## 2022-01-01 RX ADMIN — Medication 500000 UNIT(S): at 17:23

## 2022-01-01 RX ADMIN — Medication 100 MILLIGRAM(S): at 12:16

## 2022-01-01 RX ADMIN — Medication 100 MILLIGRAM(S): at 20:47

## 2022-01-01 RX ADMIN — Medication 5 MILLIGRAM(S): at 05:10

## 2022-01-01 RX ADMIN — Medication 20 MILLIGRAM(S): at 16:30

## 2022-01-01 RX ADMIN — Medication 2 DROP(S): at 05:36

## 2022-01-01 RX ADMIN — AMLODIPINE BESYLATE 10 MILLIGRAM(S): 2.5 TABLET ORAL at 05:46

## 2022-01-01 RX ADMIN — SEVELAMER CARBONATE 800 MILLIGRAM(S): 2400 POWDER, FOR SUSPENSION ORAL at 21:24

## 2022-01-01 RX ADMIN — TACROLIMUS 5 MILLIGRAM(S): 5 CAPSULE ORAL at 16:42

## 2022-01-01 RX ADMIN — Medication 1 TABLET(S): at 11:16

## 2022-01-01 RX ADMIN — Medication 4 UNIT(S): at 07:22

## 2022-01-01 RX ADMIN — WARFARIN SODIUM 5 MILLIGRAM(S): 2.5 TABLET ORAL at 22:01

## 2022-01-01 RX ADMIN — Medication 4 MILLIGRAM(S): at 05:05

## 2022-01-01 RX ADMIN — Medication 80 MILLIGRAM(S): at 11:02

## 2022-01-01 RX ADMIN — Medication 100 MILLIGRAM(S): at 05:38

## 2022-01-01 RX ADMIN — PANTOPRAZOLE SODIUM 40 MILLIGRAM(S): 20 TABLET, DELAYED RELEASE ORAL at 06:10

## 2022-01-01 RX ADMIN — DIVALPROEX SODIUM 500 MILLIGRAM(S): 500 TABLET, DELAYED RELEASE ORAL at 08:38

## 2022-01-01 RX ADMIN — CARVEDILOL PHOSPHATE 12.5 MILLIGRAM(S): 80 CAPSULE, EXTENDED RELEASE ORAL at 18:57

## 2022-01-01 RX ADMIN — Medication 2 DROP(S): at 12:05

## 2022-01-01 RX ADMIN — AMLODIPINE BESYLATE 10 MILLIGRAM(S): 2.5 TABLET ORAL at 05:15

## 2022-01-01 RX ADMIN — PIPERACILLIN AND TAZOBACTAM 200 GRAM(S): 4; .5 INJECTION, POWDER, LYOPHILIZED, FOR SOLUTION INTRAVENOUS at 00:31

## 2022-01-01 RX ADMIN — MYCOPHENOLATE MOFETIL 250 MILLIGRAM(S): 250 CAPSULE ORAL at 08:56

## 2022-01-01 RX ADMIN — PANTOPRAZOLE SODIUM 40 MILLIGRAM(S): 20 TABLET, DELAYED RELEASE ORAL at 12:17

## 2022-01-01 RX ADMIN — Medication 2 DROP(S): at 23:39

## 2022-01-01 RX ADMIN — Medication 2: at 09:02

## 2022-01-01 RX ADMIN — FOSPHENYTOIN 108 MILLIGRAM(S) PE: 50 INJECTION INTRAMUSCULAR; INTRAVENOUS at 22:20

## 2022-01-01 RX ADMIN — Medication 50 MICROGRAM(S): at 06:06

## 2022-01-01 RX ADMIN — DIVALPROEX SODIUM 1000 MILLIGRAM(S): 500 TABLET, DELAYED RELEASE ORAL at 05:44

## 2022-01-01 RX ADMIN — DIVALPROEX SODIUM 500 MILLIGRAM(S): 500 TABLET, DELAYED RELEASE ORAL at 20:04

## 2022-01-01 RX ADMIN — Medication 500000 UNIT(S): at 23:59

## 2022-01-01 RX ADMIN — SEVELAMER CARBONATE 800 MILLIGRAM(S): 2400 POWDER, FOR SUSPENSION ORAL at 17:56

## 2022-01-01 RX ADMIN — BRIVARACETAM 50 MILLIGRAM(S): 25 TABLET, FILM COATED ORAL at 05:12

## 2022-01-01 RX ADMIN — BRIVARACETAM 50 MILLIGRAM(S): 25 TABLET, FILM COATED ORAL at 05:19

## 2022-01-01 RX ADMIN — Medication 4 UNIT(S): at 17:25

## 2022-01-01 RX ADMIN — CARVEDILOL PHOSPHATE 25 MILLIGRAM(S): 80 CAPSULE, EXTENDED RELEASE ORAL at 17:33

## 2022-01-01 RX ADMIN — HUMAN INSULIN 2 UNIT(S): 100 INJECTION, SUSPENSION SUBCUTANEOUS at 05:55

## 2022-01-01 RX ADMIN — Medication 50 MICROGRAM(S): at 05:48

## 2022-01-01 RX ADMIN — POLYETHYLENE GLYCOL 3350 17 GRAM(S): 17 POWDER, FOR SOLUTION ORAL at 11:59

## 2022-01-01 RX ADMIN — BRIVARACETAM 50 MILLIGRAM(S): 25 TABLET, FILM COATED ORAL at 18:27

## 2022-01-01 RX ADMIN — Medication 2 DROP(S): at 23:14

## 2022-01-01 RX ADMIN — Medication 650 MILLIGRAM(S): at 15:15

## 2022-01-01 RX ADMIN — MAGNESIUM OXIDE 400 MG ORAL TABLET 400 MILLIGRAM(S): 241.3 TABLET ORAL at 13:09

## 2022-01-01 RX ADMIN — Medication 1000 MILLIGRAM(S): at 08:40

## 2022-01-01 RX ADMIN — Medication 200 MILLIGRAM(S): at 18:56

## 2022-01-01 RX ADMIN — CHLORHEXIDINE GLUCONATE 1 APPLICATION(S): 213 SOLUTION TOPICAL at 08:38

## 2022-01-01 RX ADMIN — AMLODIPINE BESYLATE 10 MILLIGRAM(S): 2.5 TABLET ORAL at 05:28

## 2022-01-01 RX ADMIN — Medication 2: at 12:18

## 2022-01-01 RX ADMIN — FOSPHENYTOIN 104 MILLIGRAM(S) PE: 50 INJECTION INTRAMUSCULAR; INTRAVENOUS at 08:02

## 2022-01-01 RX ADMIN — LACTULOSE 10 GRAM(S): 10 SOLUTION ORAL at 05:20

## 2022-01-01 RX ADMIN — HYDROMORPHONE HYDROCHLORIDE 0.5 MILLIGRAM(S): 2 INJECTION INTRAMUSCULAR; INTRAVENOUS; SUBCUTANEOUS at 04:21

## 2022-01-01 RX ADMIN — Medication 20 MILLIGRAM(S): at 05:15

## 2022-01-01 RX ADMIN — Medication 3 UNIT(S): at 17:38

## 2022-01-01 RX ADMIN — SEVELAMER CARBONATE 800 MILLIGRAM(S): 2400 POWDER, FOR SUSPENSION ORAL at 08:19

## 2022-01-01 RX ADMIN — Medication 1 TABLET(S): at 11:54

## 2022-01-01 RX ADMIN — FLUCONAZOLE 200 MILLIGRAM(S): 150 TABLET ORAL at 12:52

## 2022-01-01 RX ADMIN — Medication 2 DROP(S): at 23:42

## 2022-01-01 RX ADMIN — Medication 2: at 11:58

## 2022-01-01 RX ADMIN — MYCOPHENOLATE MOFETIL 500 MILLIGRAM(S): 250 CAPSULE ORAL at 20:02

## 2022-01-01 RX ADMIN — CHLORHEXIDINE GLUCONATE 1 APPLICATION(S): 213 SOLUTION TOPICAL at 07:06

## 2022-01-01 RX ADMIN — CARVEDILOL PHOSPHATE 25 MILLIGRAM(S): 80 CAPSULE, EXTENDED RELEASE ORAL at 18:08

## 2022-01-01 RX ADMIN — Medication 2: at 08:03

## 2022-01-01 RX ADMIN — Medication 10 MILLIGRAM(S): at 01:24

## 2022-01-01 RX ADMIN — VALGANCICLOVIR 450 MILLIGRAM(S): 450 TABLET, FILM COATED ORAL at 11:30

## 2022-01-01 RX ADMIN — Medication 200 MILLIGRAM(S): at 17:41

## 2022-01-01 RX ADMIN — Medication 650 MILLIGRAM(S): at 07:33

## 2022-01-01 RX ADMIN — CHLORHEXIDINE GLUCONATE 15 MILLILITER(S): 213 SOLUTION TOPICAL at 05:15

## 2022-01-01 RX ADMIN — Medication 100 MILLIGRAM(S): at 09:43

## 2022-01-01 RX ADMIN — ERYTHROPOIETIN 10000 UNIT(S): 10000 INJECTION, SOLUTION INTRAVENOUS; SUBCUTANEOUS at 10:38

## 2022-01-01 RX ADMIN — Medication 2: at 17:52

## 2022-01-01 RX ADMIN — BRIVARACETAM 50 MILLIGRAM(S): 25 TABLET, FILM COATED ORAL at 18:18

## 2022-01-01 RX ADMIN — LEVETIRACETAM 400 MILLIGRAM(S): 250 TABLET, FILM COATED ORAL at 18:29

## 2022-01-01 RX ADMIN — CARVEDILOL PHOSPHATE 12.5 MILLIGRAM(S): 80 CAPSULE, EXTENDED RELEASE ORAL at 18:16

## 2022-01-01 RX ADMIN — CARVEDILOL PHOSPHATE 25 MILLIGRAM(S): 80 CAPSULE, EXTENDED RELEASE ORAL at 18:16

## 2022-01-01 RX ADMIN — Medication 1: at 17:55

## 2022-01-01 RX ADMIN — Medication 1 TABLET(S): at 11:39

## 2022-01-01 RX ADMIN — Medication 4 MILLIGRAM(S): at 05:00

## 2022-01-01 RX ADMIN — Medication 1: at 08:14

## 2022-01-01 RX ADMIN — PANTOPRAZOLE SODIUM 40 MILLIGRAM(S): 20 TABLET, DELAYED RELEASE ORAL at 11:29

## 2022-01-01 RX ADMIN — Medication 1 GRAM(S): at 05:27

## 2022-01-01 RX ADMIN — Medication 50 MICROGRAM(S): at 06:26

## 2022-01-01 RX ADMIN — Medication 100 MILLIGRAM(S): at 05:00

## 2022-01-01 RX ADMIN — Medication 50 MILLIGRAM(S): at 05:06

## 2022-01-01 RX ADMIN — Medication 50 MILLILITER(S): at 14:22

## 2022-01-01 RX ADMIN — Medication 2: at 06:14

## 2022-01-01 RX ADMIN — Medication 1 UNIT(S): at 08:24

## 2022-01-01 RX ADMIN — Medication 100 MILLIGRAM(S): at 21:57

## 2022-01-01 RX ADMIN — MYCOPHENOLATE MOFETIL 500 MILLIGRAM(S): 250 CAPSULE ORAL at 08:15

## 2022-01-01 RX ADMIN — PANTOPRAZOLE SODIUM 40 MILLIGRAM(S): 20 TABLET, DELAYED RELEASE ORAL at 05:32

## 2022-01-01 RX ADMIN — Medication 10 MILLIGRAM(S): at 01:56

## 2022-01-01 RX ADMIN — POLYETHYLENE GLYCOL 3350 17 GRAM(S): 17 POWDER, FOR SOLUTION ORAL at 12:01

## 2022-01-01 RX ADMIN — PROPOFOL 8.71 MICROGRAM(S)/KG/MIN: 10 INJECTION, EMULSION INTRAVENOUS at 16:43

## 2022-01-01 RX ADMIN — Medication 6 MILLIGRAM(S): at 21:13

## 2022-01-01 RX ADMIN — MYCOPHENOLATE MOFETIL 1000 MILLIGRAM(S): 250 CAPSULE ORAL at 20:11

## 2022-01-01 RX ADMIN — Medication 25 GRAM(S): at 02:06

## 2022-01-01 RX ADMIN — Medication 2: at 17:18

## 2022-01-01 RX ADMIN — MYCOPHENOLATE MOFETIL 500 MILLIGRAM(S): 250 CAPSULE ORAL at 07:56

## 2022-01-01 RX ADMIN — Medication 1 TABLET(S): at 13:19

## 2022-01-01 RX ADMIN — Medication 1 TABLET(S): at 13:14

## 2022-01-01 RX ADMIN — Medication 100 MEQ/KG/HR: at 02:13

## 2022-01-01 RX ADMIN — Medication 4 UNIT(S): at 18:06

## 2022-01-01 RX ADMIN — Medication 60 MILLIGRAM(S): at 17:13

## 2022-01-01 RX ADMIN — Medication 5 MILLIGRAM(S): at 06:32

## 2022-01-01 RX ADMIN — Medication 50 MICROGRAM(S): at 05:57

## 2022-01-01 RX ADMIN — Medication 4 UNIT(S): at 17:03

## 2022-01-01 RX ADMIN — Medication 650 MILLIGRAM(S): at 14:47

## 2022-01-01 RX ADMIN — ENOXAPARIN SODIUM 60 MILLIGRAM(S): 100 INJECTION SUBCUTANEOUS at 20:13

## 2022-01-01 RX ADMIN — Medication 500 MILLIGRAM(S): at 05:00

## 2022-01-01 RX ADMIN — Medication 8 UNIT(S): at 17:53

## 2022-01-01 RX ADMIN — SEVELAMER CARBONATE 800 MILLIGRAM(S): 2400 POWDER, FOR SUSPENSION ORAL at 17:47

## 2022-01-01 RX ADMIN — LEVETIRACETAM 250 MILLIGRAM(S): 250 TABLET, FILM COATED ORAL at 17:24

## 2022-01-01 RX ADMIN — Medication 5 MILLIGRAM(S): at 06:07

## 2022-01-01 RX ADMIN — AMLODIPINE BESYLATE 10 MILLIGRAM(S): 2.5 TABLET ORAL at 08:55

## 2022-01-01 RX ADMIN — PANTOPRAZOLE SODIUM 40 MILLIGRAM(S): 20 TABLET, DELAYED RELEASE ORAL at 12:10

## 2022-01-01 RX ADMIN — Medication 50 MICROGRAM(S): at 05:58

## 2022-01-01 RX ADMIN — Medication 2: at 12:24

## 2022-01-01 RX ADMIN — Medication 2: at 13:20

## 2022-01-01 RX ADMIN — Medication 100 GRAM(S): at 13:04

## 2022-01-01 RX ADMIN — Medication 2: at 18:45

## 2022-01-01 RX ADMIN — Medication 200 MILLIGRAM(S): at 17:38

## 2022-01-01 RX ADMIN — INSULIN GLARGINE 3 UNIT(S): 100 INJECTION, SOLUTION SUBCUTANEOUS at 22:32

## 2022-01-01 RX ADMIN — Medication 1 TABLET(S): at 12:03

## 2022-01-01 RX ADMIN — MYCOPHENOLATE MOFETIL 500 MILLIGRAM(S): 250 CAPSULE ORAL at 20:28

## 2022-01-01 RX ADMIN — Medication 2: at 17:40

## 2022-01-01 RX ADMIN — Medication 4 UNIT(S): at 09:08

## 2022-01-01 RX ADMIN — Medication 3: at 17:39

## 2022-01-01 RX ADMIN — ENOXAPARIN SODIUM 60 MILLIGRAM(S): 100 INJECTION SUBCUTANEOUS at 08:23

## 2022-01-01 RX ADMIN — Medication 1: at 17:56

## 2022-01-01 RX ADMIN — CHLORHEXIDINE GLUCONATE 1 APPLICATION(S): 213 SOLUTION TOPICAL at 05:43

## 2022-01-01 RX ADMIN — CHLORHEXIDINE GLUCONATE 1 APPLICATION(S): 213 SOLUTION TOPICAL at 06:06

## 2022-01-01 RX ADMIN — POLYETHYLENE GLYCOL 3350 17 GRAM(S): 17 POWDER, FOR SOLUTION ORAL at 12:25

## 2022-01-01 RX ADMIN — Medication 5 MILLIGRAM(S): at 05:32

## 2022-01-01 RX ADMIN — MAGNESIUM OXIDE 400 MG ORAL TABLET 400 MILLIGRAM(S): 241.3 TABLET ORAL at 08:50

## 2022-01-01 RX ADMIN — SIROLIMUS 3.5 MILLIGRAM(S): 1 SOLUTION ORAL at 07:44

## 2022-01-01 RX ADMIN — CARVEDILOL PHOSPHATE 12.5 MILLIGRAM(S): 80 CAPSULE, EXTENDED RELEASE ORAL at 05:19

## 2022-01-01 RX ADMIN — AMLODIPINE BESYLATE 10 MILLIGRAM(S): 2.5 TABLET ORAL at 06:15

## 2022-01-01 RX ADMIN — Medication 10 MILLIGRAM(S): at 05:15

## 2022-01-01 RX ADMIN — Medication 3 UNIT(S): at 13:35

## 2022-01-01 RX ADMIN — DIVALPROEX SODIUM 250 MILLIGRAM(S): 500 TABLET, DELAYED RELEASE ORAL at 13:05

## 2022-01-01 RX ADMIN — PANTOPRAZOLE SODIUM 40 MILLIGRAM(S): 20 TABLET, DELAYED RELEASE ORAL at 18:25

## 2022-01-01 RX ADMIN — Medication 10 MILLIGRAM(S): at 02:40

## 2022-01-01 RX ADMIN — DESMOPRESSIN ACETATE 220 MICROGRAM(S): 0.1 TABLET ORAL at 14:36

## 2022-01-01 RX ADMIN — Medication 10 MILLIGRAM(S): at 06:15

## 2022-01-01 RX ADMIN — Medication 1 TABLET(S): at 12:20

## 2022-01-01 RX ADMIN — BRIVARACETAM 50 MILLIGRAM(S): 25 TABLET, FILM COATED ORAL at 05:30

## 2022-01-01 RX ADMIN — CARVEDILOL PHOSPHATE 12.5 MILLIGRAM(S): 80 CAPSULE, EXTENDED RELEASE ORAL at 17:15

## 2022-01-01 RX ADMIN — ENOXAPARIN SODIUM 60 MILLIGRAM(S): 100 INJECTION SUBCUTANEOUS at 21:06

## 2022-01-01 RX ADMIN — Medication 10 MILLIGRAM(S): at 01:38

## 2022-01-01 RX ADMIN — Medication 1: at 12:42

## 2022-01-01 RX ADMIN — Medication 60 MILLIGRAM(S): at 05:10

## 2022-01-01 RX ADMIN — Medication 80 MILLIGRAM(S): at 05:14

## 2022-01-01 RX ADMIN — FLUCONAZOLE 100 MILLIGRAM(S): 150 TABLET ORAL at 20:03

## 2022-01-01 RX ADMIN — INSULIN HUMAN 10 UNIT(S): 100 INJECTION, SOLUTION SUBCUTANEOUS at 01:42

## 2022-01-01 RX ADMIN — FOSPHENYTOIN 108 MILLIGRAM(S) PE: 50 INJECTION INTRAMUSCULAR; INTRAVENOUS at 09:04

## 2022-01-01 RX ADMIN — Medication 975 MILLIGRAM(S): at 07:40

## 2022-01-01 RX ADMIN — SIROLIMUS 6 MILLIGRAM(S): 1 SOLUTION ORAL at 08:55

## 2022-01-01 RX ADMIN — MYCOPHENOLATE MOFETIL 500 MILLIGRAM(S): 250 CAPSULE ORAL at 20:22

## 2022-01-01 RX ADMIN — Medication 30 MILLIGRAM(S): at 12:06

## 2022-01-01 RX ADMIN — Medication 200 MILLIGRAM(S): at 06:07

## 2022-01-01 RX ADMIN — Medication 1 TABLET(S): at 13:26

## 2022-01-01 RX ADMIN — APIXABAN 5 MILLIGRAM(S): 2.5 TABLET, FILM COATED ORAL at 17:30

## 2022-01-01 RX ADMIN — Medication 100 MILLIGRAM(S): at 21:53

## 2022-01-01 RX ADMIN — Medication 1300 MILLIGRAM(S): at 18:51

## 2022-01-01 RX ADMIN — Medication 4 MILLIGRAM(S): at 16:26

## 2022-01-01 RX ADMIN — ERTAPENEM SODIUM 100 MILLIGRAM(S): 1 INJECTION, POWDER, LYOPHILIZED, FOR SOLUTION INTRAMUSCULAR; INTRAVENOUS at 19:22

## 2022-01-01 RX ADMIN — Medication 4 MILLIGRAM(S): at 06:03

## 2022-01-01 RX ADMIN — Medication 1 TABLET(S): at 12:32

## 2022-01-01 RX ADMIN — Medication 4 MILLIGRAM(S): at 05:49

## 2022-01-01 RX ADMIN — Medication 50 MICROGRAM(S): at 05:47

## 2022-01-01 RX ADMIN — Medication 3 UNIT(S): at 13:13

## 2022-01-01 RX ADMIN — SEVELAMER CARBONATE 800 MILLIGRAM(S): 2400 POWDER, FOR SUSPENSION ORAL at 08:16

## 2022-01-01 RX ADMIN — Medication 60 MILLIGRAM(S): at 17:24

## 2022-01-01 RX ADMIN — VALGANCICLOVIR 450 MILLIGRAM(S): 450 TABLET, FILM COATED ORAL at 12:26

## 2022-01-01 RX ADMIN — Medication 1 TABLET(S): at 13:23

## 2022-01-01 RX ADMIN — SENNA PLUS 2 TABLET(S): 8.6 TABLET ORAL at 21:10

## 2022-01-01 RX ADMIN — Medication 500000 UNIT(S): at 06:03

## 2022-01-01 RX ADMIN — MAGNESIUM OXIDE 400 MG ORAL TABLET 400 MILLIGRAM(S): 241.3 TABLET ORAL at 12:03

## 2022-01-01 RX ADMIN — FINASTERIDE 5 MILLIGRAM(S): 5 TABLET, FILM COATED ORAL at 11:23

## 2022-01-01 RX ADMIN — CHLORHEXIDINE GLUCONATE 1 APPLICATION(S): 213 SOLUTION TOPICAL at 05:48

## 2022-01-01 RX ADMIN — Medication 100 GRAM(S): at 10:05

## 2022-01-01 RX ADMIN — Medication 500000 UNIT(S): at 06:30

## 2022-01-01 RX ADMIN — Medication 200 GRAM(S): at 20:35

## 2022-01-01 RX ADMIN — ENOXAPARIN SODIUM 60 MILLIGRAM(S): 100 INJECTION SUBCUTANEOUS at 08:56

## 2022-01-01 RX ADMIN — Medication 4 MILLIGRAM(S): at 06:30

## 2022-01-01 RX ADMIN — PANTOPRAZOLE SODIUM 40 MILLIGRAM(S): 20 TABLET, DELAYED RELEASE ORAL at 12:25

## 2022-01-01 RX ADMIN — Medication 650 MILLIGRAM(S): at 21:38

## 2022-01-01 RX ADMIN — BRIVARACETAM 50 MILLIGRAM(S): 25 TABLET, FILM COATED ORAL at 06:23

## 2022-01-01 RX ADMIN — Medication 5 MILLIGRAM(S): at 08:16

## 2022-01-01 RX ADMIN — LEVETIRACETAM 125 MILLIGRAM(S): 250 TABLET, FILM COATED ORAL at 23:09

## 2022-01-01 RX ADMIN — APIXABAN 5 MILLIGRAM(S): 2.5 TABLET, FILM COATED ORAL at 06:12

## 2022-01-01 RX ADMIN — Medication 4 MILLIGRAM(S): at 06:22

## 2022-01-01 RX ADMIN — HALOPERIDOL DECANOATE 2.5 MILLIGRAM(S): 100 INJECTION INTRAMUSCULAR at 16:46

## 2022-01-01 RX ADMIN — Medication 2 DROP(S): at 17:35

## 2022-01-01 RX ADMIN — FOSPHENYTOIN 108 MILLIGRAM(S) PE: 50 INJECTION INTRAMUSCULAR; INTRAVENOUS at 18:05

## 2022-01-01 RX ADMIN — DIVALPROEX SODIUM 500 MILLIGRAM(S): 500 TABLET, DELAYED RELEASE ORAL at 21:09

## 2022-01-01 RX ADMIN — FLUCONAZOLE 100 MILLIGRAM(S): 150 TABLET ORAL at 08:14

## 2022-01-01 RX ADMIN — AMLODIPINE BESYLATE 10 MILLIGRAM(S): 2.5 TABLET ORAL at 08:03

## 2022-01-01 RX ADMIN — Medication 2 DROP(S): at 17:43

## 2022-01-01 RX ADMIN — MYCOPHENOLATE MOFETIL 500 MILLIGRAM(S): 250 CAPSULE ORAL at 20:20

## 2022-01-01 RX ADMIN — PANTOPRAZOLE SODIUM 40 MILLIGRAM(S): 20 TABLET, DELAYED RELEASE ORAL at 05:30

## 2022-01-01 RX ADMIN — FINASTERIDE 5 MILLIGRAM(S): 5 TABLET, FILM COATED ORAL at 13:31

## 2022-01-01 RX ADMIN — Medication 1 APPLICATION(S): at 08:57

## 2022-01-01 RX ADMIN — Medication 1 TABLET(S): at 12:26

## 2022-01-01 RX ADMIN — Medication 5 UNIT(S): at 13:29

## 2022-01-01 RX ADMIN — Medication 4 MILLIGRAM(S): at 17:24

## 2022-01-01 RX ADMIN — Medication 200 GRAM(S): at 01:00

## 2022-01-01 RX ADMIN — PANTOPRAZOLE SODIUM 40 MILLIGRAM(S): 20 TABLET, DELAYED RELEASE ORAL at 07:33

## 2022-01-01 RX ADMIN — Medication 10 MILLIGRAM(S): at 20:16

## 2022-01-01 RX ADMIN — MYCOPHENOLATE MOFETIL 1000 MILLIGRAM(S): 250 CAPSULE ORAL at 19:50

## 2022-01-01 RX ADMIN — SENNA PLUS 2 TABLET(S): 8.6 TABLET ORAL at 21:14

## 2022-01-01 RX ADMIN — PANTOPRAZOLE SODIUM 40 MILLIGRAM(S): 20 TABLET, DELAYED RELEASE ORAL at 11:48

## 2022-01-01 RX ADMIN — Medication 60 MILLIGRAM(S): at 17:49

## 2022-01-01 RX ADMIN — TETANUS TOXOID, REDUCED DIPHTHERIA TOXOID AND ACELLULAR PERTUSSIS VACCINE, ADSORBED 0.5 MILLILITER(S): 5; 2.5; 8; 8; 2.5 SUSPENSION INTRAMUSCULAR at 07:35

## 2022-01-01 RX ADMIN — HUMAN INSULIN 2 UNIT(S): 100 INJECTION, SUSPENSION SUBCUTANEOUS at 17:57

## 2022-01-01 RX ADMIN — MYCOPHENOLATE MOFETIL 1000 MILLIGRAM(S): 250 CAPSULE ORAL at 09:07

## 2022-01-01 RX ADMIN — TACROLIMUS 2 MILLIGRAM(S): 5 CAPSULE ORAL at 13:23

## 2022-01-01 RX ADMIN — FINASTERIDE 5 MILLIGRAM(S): 5 TABLET, FILM COATED ORAL at 11:08

## 2022-01-01 RX ADMIN — OXYCODONE HYDROCHLORIDE 5 MILLIGRAM(S): 5 TABLET ORAL at 19:44

## 2022-01-01 RX ADMIN — BRIVARACETAM 50 MILLIGRAM(S): 25 TABLET, FILM COATED ORAL at 18:19

## 2022-01-01 RX ADMIN — BRIVARACETAM 50 MILLIGRAM(S): 25 TABLET, FILM COATED ORAL at 08:41

## 2022-01-01 RX ADMIN — Medication 25 MILLIGRAM(S): at 01:09

## 2022-01-01 RX ADMIN — CARVEDILOL PHOSPHATE 6.25 MILLIGRAM(S): 80 CAPSULE, EXTENDED RELEASE ORAL at 17:48

## 2022-01-01 RX ADMIN — Medication 6 MILLIGRAM(S): at 22:39

## 2022-01-01 RX ADMIN — INSULIN GLARGINE 5 UNIT(S): 100 INJECTION, SOLUTION SUBCUTANEOUS at 22:04

## 2022-01-01 RX ADMIN — SODIUM CHLORIDE 3000 MILLILITER(S): 9 INJECTION INTRAMUSCULAR; INTRAVENOUS; SUBCUTANEOUS at 10:41

## 2022-01-01 RX ADMIN — CHLORHEXIDINE GLUCONATE 1 APPLICATION(S): 213 SOLUTION TOPICAL at 12:26

## 2022-01-01 RX ADMIN — VALGANCICLOVIR 450 MILLIGRAM(S): 450 TABLET, FILM COATED ORAL at 08:30

## 2022-01-01 RX ADMIN — Medication 1300 MILLIGRAM(S): at 13:42

## 2022-01-01 RX ADMIN — MYCOPHENOLATE MOFETIL 250 MILLIGRAM(S): 250 CAPSULE ORAL at 07:49

## 2022-01-01 RX ADMIN — Medication 25 GRAM(S): at 06:54

## 2022-01-01 RX ADMIN — SEVELAMER CARBONATE 800 MILLIGRAM(S): 2400 POWDER, FOR SUSPENSION ORAL at 08:17

## 2022-01-01 RX ADMIN — Medication 4 UNIT(S): at 17:47

## 2022-01-01 RX ADMIN — HEPARIN SODIUM 5000 UNIT(S): 5000 INJECTION INTRAVENOUS; SUBCUTANEOUS at 05:44

## 2022-01-01 RX ADMIN — LEVETIRACETAM 250 MILLIGRAM(S): 250 TABLET, FILM COATED ORAL at 17:09

## 2022-01-01 RX ADMIN — MAGNESIUM OXIDE 400 MG ORAL TABLET 400 MILLIGRAM(S): 241.3 TABLET ORAL at 13:26

## 2022-01-01 RX ADMIN — Medication 4 MILLIGRAM(S): at 06:00

## 2022-01-01 RX ADMIN — DIVALPROEX SODIUM 250 MILLIGRAM(S): 500 TABLET, DELAYED RELEASE ORAL at 11:59

## 2022-01-01 RX ADMIN — Medication 100 MILLIGRAM(S): at 06:02

## 2022-01-01 RX ADMIN — CHLORHEXIDINE GLUCONATE 1 APPLICATION(S): 213 SOLUTION TOPICAL at 13:33

## 2022-01-01 RX ADMIN — POLYETHYLENE GLYCOL 3350 17 GRAM(S): 17 POWDER, FOR SOLUTION ORAL at 12:17

## 2022-01-01 RX ADMIN — ATORVASTATIN CALCIUM 80 MILLIGRAM(S): 80 TABLET, FILM COATED ORAL at 21:58

## 2022-01-01 RX ADMIN — Medication 6: at 10:31

## 2022-01-01 RX ADMIN — Medication 50 MICROGRAM(S): at 06:16

## 2022-01-01 RX ADMIN — Medication 975 MILLIGRAM(S): at 10:17

## 2022-01-01 RX ADMIN — Medication 250 MILLIGRAM(S): at 18:16

## 2022-01-01 RX ADMIN — Medication 2 DROP(S): at 23:20

## 2022-01-01 RX ADMIN — INSULIN GLARGINE 6 UNIT(S): 100 INJECTION, SOLUTION SUBCUTANEOUS at 22:02

## 2022-01-01 RX ADMIN — ATORVASTATIN CALCIUM 40 MILLIGRAM(S): 80 TABLET, FILM COATED ORAL at 21:24

## 2022-01-01 RX ADMIN — Medication 500000 UNIT(S): at 00:01

## 2022-01-01 RX ADMIN — Medication 500000 UNIT(S): at 05:25

## 2022-01-01 RX ADMIN — Medication 8 UNIT(S): at 17:42

## 2022-01-01 RX ADMIN — TACROLIMUS 1 MILLIGRAM(S): 5 CAPSULE ORAL at 20:12

## 2022-01-01 RX ADMIN — CARVEDILOL PHOSPHATE 12.5 MILLIGRAM(S): 80 CAPSULE, EXTENDED RELEASE ORAL at 05:51

## 2022-01-01 RX ADMIN — Medication 500000 UNIT(S): at 05:18

## 2022-01-01 RX ADMIN — Medication 1: at 05:58

## 2022-01-01 RX ADMIN — DIVALPROEX SODIUM 250 MILLIGRAM(S): 500 TABLET, DELAYED RELEASE ORAL at 12:05

## 2022-01-01 RX ADMIN — Medication 1300 MILLIGRAM(S): at 21:39

## 2022-01-01 RX ADMIN — Medication 500000 UNIT(S): at 12:25

## 2022-01-01 RX ADMIN — Medication 1000 MILLIGRAM(S): at 00:22

## 2022-01-01 RX ADMIN — APIXABAN 5 MILLIGRAM(S): 2.5 TABLET, FILM COATED ORAL at 05:38

## 2022-01-01 RX ADMIN — DIVALPROEX SODIUM 500 MILLIGRAM(S): 500 TABLET, DELAYED RELEASE ORAL at 05:54

## 2022-01-01 RX ADMIN — MYCOPHENOLATE MOFETIL 500 MILLIGRAM(S): 250 CAPSULE ORAL at 08:05

## 2022-01-01 RX ADMIN — Medication 80 MILLIGRAM(S): at 09:55

## 2022-01-01 RX ADMIN — LEVETIRACETAM 250 MILLIGRAM(S): 250 TABLET, FILM COATED ORAL at 06:10

## 2022-01-01 RX ADMIN — CHLORHEXIDINE GLUCONATE 1 APPLICATION(S): 213 SOLUTION TOPICAL at 07:12

## 2022-01-01 RX ADMIN — MYCOPHENOLATE MOFETIL 1000 MILLIGRAM(S): 250 CAPSULE ORAL at 08:17

## 2022-01-01 RX ADMIN — Medication 40 MILLIGRAM(S): at 11:05

## 2022-01-01 RX ADMIN — Medication 2 DROP(S): at 21:36

## 2022-01-01 RX ADMIN — Medication 500000 UNIT(S): at 11:56

## 2022-01-01 RX ADMIN — FINASTERIDE 5 MILLIGRAM(S): 5 TABLET, FILM COATED ORAL at 13:20

## 2022-01-01 RX ADMIN — Medication 200 MILLIGRAM(S): at 21:50

## 2022-01-01 RX ADMIN — Medication 80 MILLIGRAM(S): at 05:46

## 2022-01-01 RX ADMIN — MONTELUKAST 10 MILLIGRAM(S): 4 TABLET, CHEWABLE ORAL at 14:07

## 2022-01-01 RX ADMIN — MYCOPHENOLATE MOFETIL 1000 MILLIGRAM(S): 250 CAPSULE ORAL at 07:41

## 2022-01-01 RX ADMIN — LEVETIRACETAM 400 MILLIGRAM(S): 250 TABLET, FILM COATED ORAL at 05:41

## 2022-01-01 RX ADMIN — Medication 80 MILLIGRAM(S): at 09:57

## 2022-01-01 RX ADMIN — POLYETHYLENE GLYCOL 3350 17 GRAM(S): 17 POWDER, FOR SOLUTION ORAL at 11:16

## 2022-01-01 RX ADMIN — MYCOPHENOLATE MOFETIL 250 MILLIGRAM(S): 250 CAPSULE ORAL at 08:05

## 2022-01-01 RX ADMIN — CARVEDILOL PHOSPHATE 12.5 MILLIGRAM(S): 80 CAPSULE, EXTENDED RELEASE ORAL at 06:04

## 2022-01-01 RX ADMIN — CARVEDILOL PHOSPHATE 12.5 MILLIGRAM(S): 80 CAPSULE, EXTENDED RELEASE ORAL at 18:25

## 2022-01-01 RX ADMIN — WARFARIN SODIUM 5 MILLIGRAM(S): 2.5 TABLET ORAL at 22:13

## 2022-01-01 RX ADMIN — Medication 500000 UNIT(S): at 12:16

## 2022-01-01 RX ADMIN — ERYTHROPOIETIN 10000 UNIT(S): 10000 INJECTION, SOLUTION INTRAVENOUS; SUBCUTANEOUS at 12:02

## 2022-01-01 RX ADMIN — SEVELAMER CARBONATE 800 MILLIGRAM(S): 2400 POWDER, FOR SUSPENSION ORAL at 08:58

## 2022-01-01 RX ADMIN — Medication 50 MICROGRAM(S): at 05:35

## 2022-01-01 RX ADMIN — Medication 1300 MILLIGRAM(S): at 17:30

## 2022-01-01 RX ADMIN — LEVETIRACETAM 250 MILLIGRAM(S): 250 TABLET, FILM COATED ORAL at 17:30

## 2022-01-01 RX ADMIN — CARVEDILOL PHOSPHATE 12.5 MILLIGRAM(S): 80 CAPSULE, EXTENDED RELEASE ORAL at 05:22

## 2022-01-01 RX ADMIN — INSULIN GLARGINE 5 UNIT(S): 100 INJECTION, SOLUTION SUBCUTANEOUS at 22:18

## 2022-01-01 RX ADMIN — Medication 650 MILLIGRAM(S): at 05:32

## 2022-01-01 RX ADMIN — Medication 5 MILLIGRAM(S): at 06:27

## 2022-01-01 RX ADMIN — MYCOPHENOLATE MOFETIL 500 MILLIGRAM(S): 250 CAPSULE ORAL at 20:36

## 2022-01-01 RX ADMIN — Medication 650 MILLIGRAM(S): at 08:04

## 2022-01-01 RX ADMIN — Medication 10 MILLIGRAM(S): at 05:00

## 2022-01-01 RX ADMIN — Medication 4: at 13:16

## 2022-01-01 RX ADMIN — Medication 4 MILLIGRAM(S): at 05:19

## 2022-01-01 RX ADMIN — Medication 5 MILLIGRAM(S): at 05:28

## 2022-01-01 RX ADMIN — Medication 4 MILLIGRAM(S): at 05:24

## 2022-01-01 RX ADMIN — Medication 4 MILLIGRAM(S): at 05:47

## 2022-01-01 RX ADMIN — FOSPHENYTOIN 104 MILLIGRAM(S) PE: 50 INJECTION INTRAMUSCULAR; INTRAVENOUS at 08:31

## 2022-01-01 RX ADMIN — SODIUM CHLORIDE 50 MILLILITER(S): 9 INJECTION, SOLUTION INTRAVENOUS at 19:52

## 2022-01-01 RX ADMIN — Medication 50 MICROGRAM(S): at 05:27

## 2022-01-01 RX ADMIN — Medication 2 DROP(S): at 05:25

## 2022-01-01 RX ADMIN — Medication 1 TABLET(S): at 14:49

## 2022-01-01 RX ADMIN — Medication 650 MILLIGRAM(S): at 14:16

## 2022-01-01 RX ADMIN — Medication 100 MILLIGRAM(S): at 12:46

## 2022-01-01 RX ADMIN — ATORVASTATIN CALCIUM 40 MILLIGRAM(S): 80 TABLET, FILM COATED ORAL at 22:19

## 2022-01-01 RX ADMIN — Medication 500000 UNIT(S): at 23:23

## 2022-01-01 RX ADMIN — Medication 25 MILLIGRAM(S): at 13:08

## 2022-01-01 RX ADMIN — MYCOPHENOLATE MOFETIL 250 MILLIGRAM(S): 250 CAPSULE ORAL at 20:18

## 2022-01-01 RX ADMIN — Medication 500000 UNIT(S): at 23:53

## 2022-01-01 RX ADMIN — MYCOPHENOLATE MOFETIL 1000 MILLIGRAM(S): 250 CAPSULE ORAL at 05:11

## 2022-01-01 RX ADMIN — Medication 5 MILLIGRAM(S): at 06:09

## 2022-01-01 RX ADMIN — Medication 500000 UNIT(S): at 21:36

## 2022-01-01 RX ADMIN — FOSPHENYTOIN 104 MILLIGRAM(S) PE: 50 INJECTION INTRAMUSCULAR; INTRAVENOUS at 15:10

## 2022-01-01 RX ADMIN — Medication 500000 UNIT(S): at 17:36

## 2022-01-01 RX ADMIN — ERYTHROPOIETIN 10000 UNIT(S): 10000 INJECTION, SOLUTION INTRAVENOUS; SUBCUTANEOUS at 16:33

## 2022-01-01 RX ADMIN — FOSPHENYTOIN 108 MILLIGRAM(S) PE: 50 INJECTION INTRAMUSCULAR; INTRAVENOUS at 17:33

## 2022-01-01 RX ADMIN — Medication 100 MILLIGRAM(S): at 22:26

## 2022-01-01 RX ADMIN — Medication 650 MILLIGRAM(S): at 02:04

## 2022-01-01 RX ADMIN — CEFEPIME 100 MILLIGRAM(S): 1 INJECTION, POWDER, FOR SOLUTION INTRAMUSCULAR; INTRAVENOUS at 14:34

## 2022-01-01 RX ADMIN — Medication 650 MILLIGRAM(S): at 22:06

## 2022-01-01 RX ADMIN — Medication 4 MILLIGRAM(S): at 05:44

## 2022-01-01 RX ADMIN — BRIVARACETAM 50 MILLIGRAM(S): 25 TABLET, FILM COATED ORAL at 18:12

## 2022-01-01 RX ADMIN — Medication 4: at 22:00

## 2022-01-01 RX ADMIN — TACROLIMUS 1 MILLIGRAM(S): 5 CAPSULE ORAL at 13:02

## 2022-01-01 RX ADMIN — FOSPHENYTOIN 104 MILLIGRAM(S) PE: 50 INJECTION INTRAMUSCULAR; INTRAVENOUS at 15:08

## 2022-01-01 RX ADMIN — ALBUTEROL 2.5 MILLIGRAM(S): 90 AEROSOL, METERED ORAL at 21:39

## 2022-01-01 RX ADMIN — Medication 200 MILLIGRAM(S): at 05:03

## 2022-01-01 RX ADMIN — Medication 200 MILLIGRAM(S): at 05:19

## 2022-01-01 RX ADMIN — Medication 2 DROP(S): at 22:02

## 2022-01-01 RX ADMIN — Medication 650 MILLIGRAM(S): at 09:01

## 2022-01-01 RX ADMIN — Medication 1 TABLET(S): at 11:12

## 2022-01-01 RX ADMIN — PANTOPRAZOLE SODIUM 40 MILLIGRAM(S): 20 TABLET, DELAYED RELEASE ORAL at 13:17

## 2022-01-01 RX ADMIN — BRIVARACETAM 50 MILLIGRAM(S): 25 TABLET, FILM COATED ORAL at 13:44

## 2022-01-01 RX ADMIN — Medication 4 MILLIGRAM(S): at 05:17

## 2022-01-01 RX ADMIN — HEPARIN SODIUM 5000 UNIT(S): 5000 INJECTION INTRAVENOUS; SUBCUTANEOUS at 05:25

## 2022-01-01 RX ADMIN — Medication 1300 MILLIGRAM(S): at 22:08

## 2022-01-01 RX ADMIN — SIROLIMUS 2.5 MILLIGRAM(S): 1 SOLUTION ORAL at 08:10

## 2022-01-01 RX ADMIN — Medication 0.5 MILLIGRAM(S): at 05:41

## 2022-01-01 RX ADMIN — CARVEDILOL PHOSPHATE 25 MILLIGRAM(S): 80 CAPSULE, EXTENDED RELEASE ORAL at 06:03

## 2022-01-01 RX ADMIN — DIVALPROEX SODIUM 1000 MILLIGRAM(S): 500 TABLET, DELAYED RELEASE ORAL at 17:22

## 2022-01-01 RX ADMIN — Medication 6 MILLIGRAM(S): at 21:08

## 2022-01-01 RX ADMIN — DIVALPROEX SODIUM 500 MILLIGRAM(S): 500 TABLET, DELAYED RELEASE ORAL at 20:54

## 2022-01-01 RX ADMIN — ERYTHROPOIETIN 10000 UNIT(S): 10000 INJECTION, SOLUTION INTRAVENOUS; SUBCUTANEOUS at 10:36

## 2022-01-01 RX ADMIN — Medication 1300 MILLIGRAM(S): at 17:25

## 2022-01-01 RX ADMIN — Medication 25 MILLIGRAM(S): at 13:23

## 2022-01-01 RX ADMIN — Medication 3 UNIT(S): at 17:12

## 2022-01-01 RX ADMIN — WARFARIN SODIUM 7 MILLIGRAM(S): 2.5 TABLET ORAL at 21:36

## 2022-01-01 RX ADMIN — Medication 25 MILLIGRAM(S): at 21:43

## 2022-01-01 RX ADMIN — POLYETHYLENE GLYCOL 3350 17 GRAM(S): 17 POWDER, FOR SOLUTION ORAL at 12:53

## 2022-01-01 RX ADMIN — Medication 10 MILLIGRAM(S): at 05:17

## 2022-01-01 RX ADMIN — MYCOPHENOLATE MOFETIL 250 MILLIGRAM(S): 250 CAPSULE ORAL at 09:37

## 2022-01-01 RX ADMIN — Medication 25 MILLIGRAM(S): at 21:34

## 2022-01-01 RX ADMIN — Medication 9 UNIT(S): at 13:27

## 2022-01-01 RX ADMIN — Medication 500000 UNIT(S): at 23:10

## 2022-01-01 RX ADMIN — PANTOPRAZOLE SODIUM 40 MILLIGRAM(S): 20 TABLET, DELAYED RELEASE ORAL at 05:28

## 2022-01-01 RX ADMIN — Medication 500 MILLIGRAM(S): at 18:28

## 2022-01-01 RX ADMIN — CARVEDILOL PHOSPHATE 12.5 MILLIGRAM(S): 80 CAPSULE, EXTENDED RELEASE ORAL at 17:31

## 2022-01-01 RX ADMIN — MYCOPHENOLATE MOFETIL 500 MILLIGRAM(S): 250 CAPSULE ORAL at 08:48

## 2022-01-01 RX ADMIN — Medication 200 GRAM(S): at 06:52

## 2022-01-01 RX ADMIN — BRIVARACETAM 50 MILLIGRAM(S): 25 TABLET, FILM COATED ORAL at 20:57

## 2022-01-01 RX ADMIN — Medication 4 MILLIGRAM(S): at 05:16

## 2022-01-01 RX ADMIN — Medication 4 UNIT(S): at 17:46

## 2022-01-01 RX ADMIN — CEFEPIME 100 MILLIGRAM(S): 1 INJECTION, POWDER, FOR SOLUTION INTRAMUSCULAR; INTRAVENOUS at 12:24

## 2022-01-01 RX ADMIN — MAGNESIUM OXIDE 400 MG ORAL TABLET 400 MILLIGRAM(S): 241.3 TABLET ORAL at 17:30

## 2022-01-01 RX ADMIN — CARVEDILOL PHOSPHATE 6.25 MILLIGRAM(S): 80 CAPSULE, EXTENDED RELEASE ORAL at 06:10

## 2022-01-01 RX ADMIN — VALGANCICLOVIR 450 MILLIGRAM(S): 450 TABLET, FILM COATED ORAL at 12:34

## 2022-01-01 RX ADMIN — Medication 1 TABLET(S): at 13:15

## 2022-01-01 RX ADMIN — Medication 75 MILLIGRAM(S): at 05:00

## 2022-01-01 RX ADMIN — Medication 4 UNIT(S): at 12:26

## 2022-01-01 RX ADMIN — Medication 4 UNIT(S): at 13:05

## 2022-01-01 RX ADMIN — Medication 500000 UNIT(S): at 00:34

## 2022-01-01 RX ADMIN — APIXABAN 2.5 MILLIGRAM(S): 2.5 TABLET, FILM COATED ORAL at 17:51

## 2022-01-01 RX ADMIN — DIVALPROEX SODIUM 1000 MILLIGRAM(S): 500 TABLET, DELAYED RELEASE ORAL at 18:43

## 2022-01-01 RX ADMIN — Medication 500 MILLIGRAM(S): at 06:15

## 2022-01-01 RX ADMIN — Medication 2 DROP(S): at 06:57

## 2022-01-01 RX ADMIN — Medication 5 UNIT(S): at 08:26

## 2022-01-01 RX ADMIN — Medication 1300 MILLIGRAM(S): at 17:07

## 2022-01-01 RX ADMIN — MIDAZOLAM HYDROCHLORIDE 4 MILLIGRAM(S): 1 INJECTION, SOLUTION INTRAMUSCULAR; INTRAVENOUS at 00:54

## 2022-01-01 RX ADMIN — Medication 975 MILLIGRAM(S): at 17:06

## 2022-01-01 RX ADMIN — SENNA PLUS 2 TABLET(S): 8.6 TABLET ORAL at 21:13

## 2022-01-01 RX ADMIN — CHLORHEXIDINE GLUCONATE 1 APPLICATION(S): 213 SOLUTION TOPICAL at 11:37

## 2022-01-01 RX ADMIN — Medication 1 SUPPOSITORY(S): at 18:01

## 2022-01-01 RX ADMIN — Medication 10 UNIT(S): at 08:56

## 2022-01-01 RX ADMIN — Medication 2 DROP(S): at 06:17

## 2022-01-01 RX ADMIN — FINASTERIDE 5 MILLIGRAM(S): 5 TABLET, FILM COATED ORAL at 12:19

## 2022-01-01 RX ADMIN — Medication 650 MILLIGRAM(S): at 12:30

## 2022-01-01 RX ADMIN — FOSPHENYTOIN 108 MILLIGRAM(S) PE: 50 INJECTION INTRAMUSCULAR; INTRAVENOUS at 22:39

## 2022-01-01 RX ADMIN — Medication 1300 MILLIGRAM(S): at 22:20

## 2022-01-01 RX ADMIN — ENOXAPARIN SODIUM 40 MILLIGRAM(S): 100 INJECTION SUBCUTANEOUS at 06:11

## 2022-01-01 RX ADMIN — PANTOPRAZOLE SODIUM 40 MILLIGRAM(S): 20 TABLET, DELAYED RELEASE ORAL at 12:18

## 2022-01-01 RX ADMIN — Medication 3: at 17:24

## 2022-01-01 RX ADMIN — PANTOPRAZOLE SODIUM 40 MILLIGRAM(S): 20 TABLET, DELAYED RELEASE ORAL at 12:24

## 2022-01-01 RX ADMIN — CHLORHEXIDINE GLUCONATE 1 APPLICATION(S): 213 SOLUTION TOPICAL at 10:19

## 2022-01-01 RX ADMIN — SEVELAMER CARBONATE 800 MILLIGRAM(S): 2400 POWDER, FOR SUSPENSION ORAL at 08:39

## 2022-01-01 RX ADMIN — Medication 500000 UNIT(S): at 13:06

## 2022-01-01 RX ADMIN — Medication 9 UNIT(S): at 13:12

## 2022-01-01 RX ADMIN — HEPARIN SODIUM 5000 UNIT(S): 5000 INJECTION INTRAVENOUS; SUBCUTANEOUS at 17:31

## 2022-01-01 RX ADMIN — Medication 1300 MILLIGRAM(S): at 08:01

## 2022-01-01 RX ADMIN — CARVEDILOL PHOSPHATE 6.25 MILLIGRAM(S): 80 CAPSULE, EXTENDED RELEASE ORAL at 21:11

## 2022-01-01 RX ADMIN — Medication 4 UNIT(S): at 17:55

## 2022-01-01 RX ADMIN — MYCOPHENOLATE MOFETIL 500 MILLIGRAM(S): 250 CAPSULE ORAL at 08:33

## 2022-01-01 RX ADMIN — Medication 1: at 11:51

## 2022-01-01 RX ADMIN — SENNA PLUS 2 TABLET(S): 8.6 TABLET ORAL at 20:33

## 2022-01-01 RX ADMIN — PANTOPRAZOLE SODIUM 40 MILLIGRAM(S): 20 TABLET, DELAYED RELEASE ORAL at 08:59

## 2022-01-01 RX ADMIN — Medication 650 MILLIGRAM(S): at 17:51

## 2022-01-01 RX ADMIN — PANTOPRAZOLE SODIUM 40 MILLIGRAM(S): 20 TABLET, DELAYED RELEASE ORAL at 12:16

## 2022-01-01 RX ADMIN — Medication 100 MILLIGRAM(S): at 05:21

## 2022-01-01 RX ADMIN — Medication 80 MILLIGRAM(S): at 09:18

## 2022-01-01 RX ADMIN — HEPARIN SODIUM 5000 UNIT(S): 5000 INJECTION INTRAVENOUS; SUBCUTANEOUS at 18:19

## 2022-01-01 RX ADMIN — Medication 2 DROP(S): at 05:44

## 2022-01-01 RX ADMIN — OXYCODONE HYDROCHLORIDE 5 MILLIGRAM(S): 5 TABLET ORAL at 05:31

## 2022-01-01 RX ADMIN — Medication 2 UNIT(S): at 09:03

## 2022-01-01 RX ADMIN — CARVEDILOL PHOSPHATE 3.12 MILLIGRAM(S): 80 CAPSULE, EXTENDED RELEASE ORAL at 05:54

## 2022-01-01 RX ADMIN — Medication 9 UNIT(S): at 12:54

## 2022-01-01 RX ADMIN — WARFARIN SODIUM 7 MILLIGRAM(S): 2.5 TABLET ORAL at 23:13

## 2022-01-01 RX ADMIN — Medication 4 MILLIGRAM(S): at 17:54

## 2022-01-01 RX ADMIN — Medication 500000 UNIT(S): at 05:15

## 2022-01-01 RX ADMIN — MYCOPHENOLATE MOFETIL 1000 MILLIGRAM(S): 250 CAPSULE ORAL at 08:56

## 2022-01-01 RX ADMIN — Medication 25 GRAM(S): at 10:24

## 2022-01-01 RX ADMIN — Medication 1 GRAM(S): at 06:08

## 2022-01-01 RX ADMIN — Medication 650 MILLIGRAM(S): at 02:02

## 2022-01-01 RX ADMIN — DIVALPROEX SODIUM 500 MILLIGRAM(S): 500 TABLET, DELAYED RELEASE ORAL at 06:10

## 2022-01-01 RX ADMIN — APIXABAN 2.5 MILLIGRAM(S): 2.5 TABLET, FILM COATED ORAL at 05:09

## 2022-01-01 RX ADMIN — Medication 100 MILLIGRAM(S): at 13:17

## 2022-01-01 RX ADMIN — INSULIN HUMAN 3 UNIT(S)/HR: 100 INJECTION, SOLUTION SUBCUTANEOUS at 00:46

## 2022-01-01 RX ADMIN — Medication 4 UNIT(S): at 12:16

## 2022-01-01 RX ADMIN — Medication 2 DROP(S): at 17:57

## 2022-01-01 RX ADMIN — Medication 650 MILLIGRAM(S): at 03:42

## 2022-01-01 RX ADMIN — Medication 2: at 01:47

## 2022-01-01 RX ADMIN — CHLORHEXIDINE GLUCONATE 1 APPLICATION(S): 213 SOLUTION TOPICAL at 13:35

## 2022-01-01 RX ADMIN — BRIVARACETAM 50 MILLIGRAM(S): 25 TABLET, FILM COATED ORAL at 18:51

## 2022-01-01 RX ADMIN — Medication 75 MILLIGRAM(S): at 05:53

## 2022-01-01 RX ADMIN — Medication 50 MICROGRAM(S): at 05:55

## 2022-01-01 RX ADMIN — CARVEDILOL PHOSPHATE 12.5 MILLIGRAM(S): 80 CAPSULE, EXTENDED RELEASE ORAL at 05:28

## 2022-01-01 RX ADMIN — MYCOPHENOLATE MOFETIL 1000 MILLIGRAM(S): 250 CAPSULE ORAL at 20:44

## 2022-01-01 RX ADMIN — Medication 4: at 12:50

## 2022-01-01 RX ADMIN — MYCOPHENOLATE MOFETIL 1000 MILLIGRAM(S): 250 CAPSULE ORAL at 20:04

## 2022-01-01 RX ADMIN — BRIVARACETAM 50 MILLIGRAM(S): 25 TABLET, FILM COATED ORAL at 06:05

## 2022-01-01 RX ADMIN — Medication 500000 UNIT(S): at 11:06

## 2022-01-01 RX ADMIN — INSULIN GLARGINE 6 UNIT(S): 100 INJECTION, SOLUTION SUBCUTANEOUS at 22:00

## 2022-01-01 RX ADMIN — Medication 4 MILLIGRAM(S): at 17:11

## 2022-01-01 RX ADMIN — Medication 500 MILLIGRAM(S): at 05:35

## 2022-01-01 RX ADMIN — HEPARIN SODIUM 5000 UNIT(S): 5000 INJECTION INTRAVENOUS; SUBCUTANEOUS at 05:12

## 2022-01-01 RX ADMIN — Medication 6 MILLIGRAM(S): at 21:40

## 2022-01-01 RX ADMIN — Medication 1: at 08:04

## 2022-01-01 RX ADMIN — LEVETIRACETAM 250 MILLIGRAM(S): 250 TABLET, FILM COATED ORAL at 18:50

## 2022-01-01 RX ADMIN — VALGANCICLOVIR 450 MILLIGRAM(S): 450 TABLET, FILM COATED ORAL at 13:22

## 2022-01-01 RX ADMIN — Medication 2 DROP(S): at 20:56

## 2022-01-01 RX ADMIN — MYCOPHENOLATE MOFETIL 500 MILLIGRAM(S): 250 CAPSULE ORAL at 20:08

## 2022-01-01 RX ADMIN — CHLORHEXIDINE GLUCONATE 1 APPLICATION(S): 213 SOLUTION TOPICAL at 06:34

## 2022-01-01 RX ADMIN — Medication 1: at 13:06

## 2022-01-01 RX ADMIN — INSULIN HUMAN 3 UNIT(S)/HR: 100 INJECTION, SOLUTION SUBCUTANEOUS at 20:18

## 2022-01-01 RX ADMIN — Medication 500 MILLIGRAM(S): at 05:28

## 2022-01-01 RX ADMIN — TRAMADOL HYDROCHLORIDE 25 MILLIGRAM(S): 50 TABLET ORAL at 06:05

## 2022-01-01 RX ADMIN — VALGANCICLOVIR 450 MILLIGRAM(S): 450 TABLET, FILM COATED ORAL at 11:05

## 2022-01-01 RX ADMIN — MEROPENEM 100 MILLIGRAM(S): 1 INJECTION INTRAVENOUS at 05:10

## 2022-01-01 RX ADMIN — INSULIN GLARGINE 7 UNIT(S): 100 INJECTION, SOLUTION SUBCUTANEOUS at 22:19

## 2022-01-01 RX ADMIN — Medication 975 MILLIGRAM(S): at 12:15

## 2022-01-01 RX ADMIN — ERYTHROPOIETIN 10000 UNIT(S): 10000 INJECTION, SOLUTION INTRAVENOUS; SUBCUTANEOUS at 12:00

## 2022-01-01 RX ADMIN — LEVETIRACETAM 250 MILLIGRAM(S): 250 TABLET, FILM COATED ORAL at 05:54

## 2022-01-01 RX ADMIN — FOSPHENYTOIN 108 MILLIGRAM(S) PE: 50 INJECTION INTRAMUSCULAR; INTRAVENOUS at 06:08

## 2022-01-01 RX ADMIN — Medication 4 MILLIGRAM(S): at 17:49

## 2022-01-01 RX ADMIN — PANTOPRAZOLE SODIUM 40 MILLIGRAM(S): 20 TABLET, DELAYED RELEASE ORAL at 08:41

## 2022-01-01 RX ADMIN — WARFARIN SODIUM 7 MILLIGRAM(S): 2.5 TABLET ORAL at 21:57

## 2022-01-01 RX ADMIN — Medication 4 MILLIGRAM(S): at 16:50

## 2022-01-01 RX ADMIN — HUMAN INSULIN 2 UNIT(S): 100 INJECTION, SUSPENSION SUBCUTANEOUS at 22:49

## 2022-01-01 RX ADMIN — HUMAN INSULIN 2 UNIT(S): 100 INJECTION, SUSPENSION SUBCUTANEOUS at 23:00

## 2022-01-01 RX ADMIN — MYCOPHENOLATE MOFETIL 500 MILLIGRAM(S): 250 CAPSULE ORAL at 20:49

## 2022-01-01 RX ADMIN — Medication 4 MILLIGRAM(S): at 05:01

## 2022-01-01 RX ADMIN — Medication 10 MILLIGRAM(S): at 05:24

## 2022-01-01 RX ADMIN — SIROLIMUS 0.5 MILLIGRAM(S): 1 SOLUTION ORAL at 13:49

## 2022-01-01 RX ADMIN — Medication 60 MILLIGRAM(S): at 05:09

## 2022-01-01 RX ADMIN — Medication 2: at 12:55

## 2022-01-01 RX ADMIN — PANTOPRAZOLE SODIUM 40 MILLIGRAM(S): 20 TABLET, DELAYED RELEASE ORAL at 06:11

## 2022-01-01 RX ADMIN — MAGNESIUM OXIDE 400 MG ORAL TABLET 400 MILLIGRAM(S): 241.3 TABLET ORAL at 05:42

## 2022-01-01 RX ADMIN — LEVETIRACETAM 400 MILLIGRAM(S): 250 TABLET, FILM COATED ORAL at 00:47

## 2022-01-01 RX ADMIN — Medication 100 MILLIGRAM(S): at 13:12

## 2022-01-01 RX ADMIN — Medication 250 MILLIGRAM(S): at 10:07

## 2022-01-01 RX ADMIN — Medication 200 MILLIGRAM(S): at 17:28

## 2022-01-01 RX ADMIN — PANTOPRAZOLE SODIUM 40 MILLIGRAM(S): 20 TABLET, DELAYED RELEASE ORAL at 07:49

## 2022-01-01 RX ADMIN — Medication 500000 UNIT(S): at 18:22

## 2022-01-01 RX ADMIN — Medication 75 MILLIGRAM(S): at 05:59

## 2022-01-01 RX ADMIN — SENNA PLUS 2 TABLET(S): 8.6 TABLET ORAL at 21:43

## 2022-01-01 RX ADMIN — Medication 1 APPLICATION(S): at 05:48

## 2022-01-01 RX ADMIN — Medication 5 MILLIGRAM(S): at 18:05

## 2022-01-01 RX ADMIN — BRIVARACETAM 50 MILLIGRAM(S): 25 TABLET, FILM COATED ORAL at 05:18

## 2022-01-01 RX ADMIN — Medication 500000 UNIT(S): at 18:16

## 2022-01-01 RX ADMIN — WARFARIN SODIUM 7.5 MILLIGRAM(S): 2.5 TABLET ORAL at 21:35

## 2022-01-01 RX ADMIN — OXYCODONE HYDROCHLORIDE 2.5 MILLIGRAM(S): 5 TABLET ORAL at 20:30

## 2022-01-01 RX ADMIN — HUMAN INSULIN 2 UNIT(S): 100 INJECTION, SUSPENSION SUBCUTANEOUS at 06:14

## 2022-01-01 RX ADMIN — Medication 1: at 01:37

## 2022-01-01 RX ADMIN — Medication 40 MICROGRAM(S): at 23:38

## 2022-01-01 RX ADMIN — Medication 8 MILLIGRAM(S): at 21:41

## 2022-01-01 RX ADMIN — Medication 3 MILLIGRAM(S): at 22:46

## 2022-01-01 RX ADMIN — Medication 4: at 17:46

## 2022-01-01 RX ADMIN — Medication 5 MILLIGRAM(S): at 06:03

## 2022-01-01 RX ADMIN — Medication 5 MILLIGRAM(S): at 05:31

## 2022-01-01 RX ADMIN — Medication 7 UNIT(S): at 12:50

## 2022-01-01 RX ADMIN — ENOXAPARIN SODIUM 60 MILLIGRAM(S): 100 INJECTION SUBCUTANEOUS at 17:01

## 2022-01-01 RX ADMIN — Medication 80 MILLIGRAM(S): at 06:04

## 2022-01-01 RX ADMIN — Medication 500 MILLIGRAM(S): at 17:59

## 2022-01-01 RX ADMIN — Medication 200 MILLIGRAM(S): at 05:27

## 2022-01-01 RX ADMIN — Medication 10 MILLIGRAM(S): at 00:44

## 2022-01-01 RX ADMIN — Medication 8: at 22:37

## 2022-01-01 RX ADMIN — Medication 4 UNIT(S): at 07:46

## 2022-01-01 RX ADMIN — PANTOPRAZOLE SODIUM 40 MILLIGRAM(S): 20 TABLET, DELAYED RELEASE ORAL at 11:08

## 2022-01-01 RX ADMIN — Medication 4: at 13:03

## 2022-01-01 RX ADMIN — Medication 200 GRAM(S): at 10:46

## 2022-01-01 RX ADMIN — SEVELAMER CARBONATE 800 MILLIGRAM(S): 2400 POWDER, FOR SUSPENSION ORAL at 06:01

## 2022-01-01 RX ADMIN — Medication 50 MILLIGRAM(S): at 13:53

## 2022-01-01 RX ADMIN — Medication 6 MILLIGRAM(S): at 21:34

## 2022-01-01 RX ADMIN — POLYETHYLENE GLYCOL 3350 17 GRAM(S): 17 POWDER, FOR SOLUTION ORAL at 09:18

## 2022-01-01 RX ADMIN — BRIVARACETAM 50 MILLIGRAM(S): 25 TABLET, FILM COATED ORAL at 15:36

## 2022-01-01 RX ADMIN — SEVELAMER CARBONATE 800 MILLIGRAM(S): 2400 POWDER, FOR SUSPENSION ORAL at 17:10

## 2022-01-01 RX ADMIN — FOSPHENYTOIN 102 MILLIGRAM(S) PE: 50 INJECTION INTRAMUSCULAR; INTRAVENOUS at 08:03

## 2022-01-01 RX ADMIN — Medication 80 MILLIGRAM(S): at 05:13

## 2022-01-01 RX ADMIN — INSULIN GLARGINE 5 UNIT(S): 100 INJECTION, SOLUTION SUBCUTANEOUS at 17:48

## 2022-01-01 RX ADMIN — Medication 650 MILLIGRAM(S): at 14:17

## 2022-01-01 RX ADMIN — ATORVASTATIN CALCIUM 40 MILLIGRAM(S): 80 TABLET, FILM COATED ORAL at 21:34

## 2022-01-01 RX ADMIN — Medication 2 DROP(S): at 17:09

## 2022-01-01 RX ADMIN — CARVEDILOL PHOSPHATE 12.5 MILLIGRAM(S): 80 CAPSULE, EXTENDED RELEASE ORAL at 17:57

## 2022-01-01 RX ADMIN — FOSPHENYTOIN 104 MILLIGRAM(S) PE: 50 INJECTION INTRAMUSCULAR; INTRAVENOUS at 08:26

## 2022-01-01 RX ADMIN — Medication 4 MILLIGRAM(S): at 17:56

## 2022-01-01 RX ADMIN — Medication 500 MILLIGRAM(S): at 21:51

## 2022-01-01 RX ADMIN — Medication 4 MILLIGRAM(S): at 18:16

## 2022-01-01 RX ADMIN — CARVEDILOL PHOSPHATE 6.25 MILLIGRAM(S): 80 CAPSULE, EXTENDED RELEASE ORAL at 12:25

## 2022-01-01 RX ADMIN — Medication 5 UNIT(S): at 17:14

## 2022-01-01 RX ADMIN — FOSPHENYTOIN 104 MILLIGRAM(S) PE: 50 INJECTION INTRAMUSCULAR; INTRAVENOUS at 00:58

## 2022-01-01 RX ADMIN — Medication 2 DROP(S): at 06:26

## 2022-01-01 RX ADMIN — Medication 9 UNIT(S): at 09:12

## 2022-01-01 RX ADMIN — CARVEDILOL PHOSPHATE 12.5 MILLIGRAM(S): 80 CAPSULE, EXTENDED RELEASE ORAL at 06:19

## 2022-01-01 RX ADMIN — MYCOPHENOLATE MOFETIL 1000 MILLIGRAM(S): 250 CAPSULE ORAL at 20:34

## 2022-01-01 RX ADMIN — Medication 500000 UNIT(S): at 06:06

## 2022-01-01 RX ADMIN — Medication 1 TABLET(S): at 11:04

## 2022-01-01 RX ADMIN — INSULIN GLARGINE 8 UNIT(S): 100 INJECTION, SOLUTION SUBCUTANEOUS at 21:59

## 2022-01-01 RX ADMIN — LEVETIRACETAM 250 MILLIGRAM(S): 250 TABLET, FILM COATED ORAL at 21:15

## 2022-01-01 RX ADMIN — Medication 2: at 21:47

## 2022-01-01 RX ADMIN — LEVETIRACETAM 400 MILLIGRAM(S): 250 TABLET, FILM COATED ORAL at 05:02

## 2022-01-01 RX ADMIN — Medication 500000 UNIT(S): at 05:48

## 2022-01-01 RX ADMIN — HEPARIN SODIUM 10.5 UNIT(S)/HR: 5000 INJECTION INTRAVENOUS; SUBCUTANEOUS at 10:24

## 2022-01-01 RX ADMIN — MYCOPHENOLATE MOFETIL 500 MILLIGRAM(S): 250 CAPSULE ORAL at 20:14

## 2022-01-01 RX ADMIN — SENNA PLUS 2 TABLET(S): 8.6 TABLET ORAL at 21:53

## 2022-01-01 RX ADMIN — Medication 650 MILLIGRAM(S): at 13:33

## 2022-01-01 RX ADMIN — MYCOPHENOLATE MOFETIL 1000 MILLIGRAM(S): 250 CAPSULE ORAL at 17:18

## 2022-01-01 RX ADMIN — Medication 2: at 22:39

## 2022-01-01 RX ADMIN — AMLODIPINE BESYLATE 10 MILLIGRAM(S): 2.5 TABLET ORAL at 08:18

## 2022-01-01 RX ADMIN — Medication 1 GRAM(S): at 05:14

## 2022-01-01 RX ADMIN — FINASTERIDE 5 MILLIGRAM(S): 5 TABLET, FILM COATED ORAL at 12:21

## 2022-01-01 RX ADMIN — Medication 5 UNIT(S): at 11:52

## 2022-01-01 RX ADMIN — MYCOPHENOLATE MOFETIL 250 MILLIGRAM(S): 250 CAPSULE ORAL at 07:38

## 2022-01-01 RX ADMIN — CHLORHEXIDINE GLUCONATE 1 APPLICATION(S): 213 SOLUTION TOPICAL at 12:54

## 2022-01-01 RX ADMIN — FLUCONAZOLE 100 MILLIGRAM(S): 150 TABLET ORAL at 17:29

## 2022-01-01 RX ADMIN — ENOXAPARIN SODIUM 60 MILLIGRAM(S): 100 INJECTION SUBCUTANEOUS at 08:55

## 2022-01-01 RX ADMIN — Medication 2 DROP(S): at 11:29

## 2022-01-01 RX ADMIN — Medication 1300 MILLIGRAM(S): at 08:16

## 2022-01-01 RX ADMIN — Medication 60 MILLIGRAM(S): at 05:36

## 2022-01-01 RX ADMIN — Medication 4 MILLIGRAM(S): at 05:39

## 2022-01-01 RX ADMIN — INSULIN GLARGINE 7 UNIT(S): 100 INJECTION, SOLUTION SUBCUTANEOUS at 21:51

## 2022-01-01 RX ADMIN — CHLORHEXIDINE GLUCONATE 1 APPLICATION(S): 213 SOLUTION TOPICAL at 05:16

## 2022-01-01 RX ADMIN — Medication 1: at 08:25

## 2022-01-01 RX ADMIN — Medication 40 MICROGRAM(S): at 22:47

## 2022-01-01 RX ADMIN — BRIVARACETAM 50 MILLIGRAM(S): 25 TABLET, FILM COATED ORAL at 10:02

## 2022-01-01 RX ADMIN — CARVEDILOL PHOSPHATE 12.5 MILLIGRAM(S): 80 CAPSULE, EXTENDED RELEASE ORAL at 05:05

## 2022-01-01 RX ADMIN — CARVEDILOL PHOSPHATE 12.5 MILLIGRAM(S): 80 CAPSULE, EXTENDED RELEASE ORAL at 05:46

## 2022-01-01 RX ADMIN — MYCOPHENOLATE MOFETIL 250 MILLIGRAM(S): 250 CAPSULE ORAL at 09:01

## 2022-01-01 RX ADMIN — FOSPHENYTOIN 104 MILLIGRAM(S) PE: 50 INJECTION INTRAMUSCULAR; INTRAVENOUS at 23:54

## 2022-01-01 RX ADMIN — Medication 1: at 14:46

## 2022-01-01 RX ADMIN — MAGNESIUM OXIDE 400 MG ORAL TABLET 400 MILLIGRAM(S): 241.3 TABLET ORAL at 05:09

## 2022-01-01 RX ADMIN — FLUCONAZOLE 200 MILLIGRAM(S): 150 TABLET ORAL at 12:08

## 2022-01-01 RX ADMIN — Medication 500000 UNIT(S): at 12:08

## 2022-01-01 RX ADMIN — Medication 6 MILLIGRAM(S): at 22:15

## 2022-01-01 RX ADMIN — LEVETIRACETAM 250 MILLIGRAM(S): 250 TABLET, FILM COATED ORAL at 20:31

## 2022-01-01 RX ADMIN — Medication 10 MILLIGRAM(S): at 05:22

## 2022-01-01 RX ADMIN — SENNA PLUS 2 TABLET(S): 8.6 TABLET ORAL at 20:04

## 2022-01-01 RX ADMIN — CHLORHEXIDINE GLUCONATE 1 APPLICATION(S): 213 SOLUTION TOPICAL at 09:23

## 2022-01-01 RX ADMIN — Medication 650 MILLIGRAM(S): at 22:29

## 2022-01-01 RX ADMIN — HEPARIN SODIUM 5000 UNIT(S): 5000 INJECTION INTRAVENOUS; SUBCUTANEOUS at 05:48

## 2022-01-01 RX ADMIN — FINASTERIDE 5 MILLIGRAM(S): 5 TABLET, FILM COATED ORAL at 16:52

## 2022-01-01 RX ADMIN — Medication 2 DROP(S): at 18:18

## 2022-01-01 RX ADMIN — MYCOPHENOLATE MOFETIL 1000 MILLIGRAM(S): 250 CAPSULE ORAL at 07:42

## 2022-01-01 RX ADMIN — CARVEDILOL PHOSPHATE 3.12 MILLIGRAM(S): 80 CAPSULE, EXTENDED RELEASE ORAL at 18:41

## 2022-01-01 RX ADMIN — Medication 975 MILLIGRAM(S): at 03:44

## 2022-01-01 RX ADMIN — MYCOPHENOLATE MOFETIL 500 MILLIGRAM(S): 250 CAPSULE ORAL at 08:00

## 2022-01-01 RX ADMIN — MYCOPHENOLATE MOFETIL 1000 MILLIGRAM(S): 250 CAPSULE ORAL at 05:20

## 2022-01-01 RX ADMIN — LEVETIRACETAM 250 MILLIGRAM(S): 250 TABLET, FILM COATED ORAL at 05:30

## 2022-01-01 RX ADMIN — WARFARIN SODIUM 10 MILLIGRAM(S): 2.5 TABLET ORAL at 20:51

## 2022-01-01 RX ADMIN — Medication 25 MILLIGRAM(S): at 06:24

## 2022-01-01 RX ADMIN — INSULIN GLARGINE 5 UNIT(S): 100 INJECTION, SOLUTION SUBCUTANEOUS at 22:13

## 2022-01-01 RX ADMIN — Medication 4 MILLIGRAM(S): at 17:48

## 2022-01-01 RX ADMIN — Medication 1300 MILLIGRAM(S): at 05:12

## 2022-01-01 RX ADMIN — CARVEDILOL PHOSPHATE 12.5 MILLIGRAM(S): 80 CAPSULE, EXTENDED RELEASE ORAL at 05:10

## 2022-01-01 RX ADMIN — ATOVAQUONE 1500 MILLIGRAM(S): 750 SUSPENSION ORAL at 08:56

## 2022-01-01 RX ADMIN — Medication 50 MILLILITER(S): at 20:32

## 2022-01-01 RX ADMIN — CHLORHEXIDINE GLUCONATE 1 APPLICATION(S): 213 SOLUTION TOPICAL at 09:22

## 2022-01-01 RX ADMIN — Medication 5 UNIT(S): at 17:10

## 2022-01-01 RX ADMIN — Medication 8: at 12:40

## 2022-01-01 RX ADMIN — INSULIN GLARGINE 5 UNIT(S): 100 INJECTION, SOLUTION SUBCUTANEOUS at 22:26

## 2022-01-01 RX ADMIN — Medication 1: at 09:58

## 2022-01-01 RX ADMIN — Medication 650 MILLIGRAM(S): at 08:48

## 2022-01-01 RX ADMIN — INSULIN GLARGINE 5 UNIT(S): 100 INJECTION, SOLUTION SUBCUTANEOUS at 01:44

## 2022-01-01 RX ADMIN — CARVEDILOL PHOSPHATE 3.12 MILLIGRAM(S): 80 CAPSULE, EXTENDED RELEASE ORAL at 17:52

## 2022-01-01 RX ADMIN — CARVEDILOL PHOSPHATE 12.5 MILLIGRAM(S): 80 CAPSULE, EXTENDED RELEASE ORAL at 05:26

## 2022-01-01 RX ADMIN — CHLORHEXIDINE GLUCONATE 1 APPLICATION(S): 213 SOLUTION TOPICAL at 07:43

## 2022-01-01 RX ADMIN — SENNA PLUS 2 TABLET(S): 8.6 TABLET ORAL at 13:23

## 2022-01-01 RX ADMIN — Medication 1 TABLET(S): at 12:29

## 2022-01-01 RX ADMIN — SIROLIMUS 7 MILLIGRAM(S): 1 SOLUTION ORAL at 08:01

## 2022-01-01 RX ADMIN — LEVETIRACETAM 250 MILLIGRAM(S): 250 TABLET, FILM COATED ORAL at 20:45

## 2022-01-01 RX ADMIN — Medication 9 UNIT(S): at 09:19

## 2022-01-01 RX ADMIN — Medication 50 MILLIGRAM(S): at 22:15

## 2022-01-01 RX ADMIN — Medication 2 DROP(S): at 12:27

## 2022-01-01 RX ADMIN — Medication 1300 MILLIGRAM(S): at 08:19

## 2022-01-01 RX ADMIN — FOSPHENYTOIN 108 MILLIGRAM(S) PE: 50 INJECTION INTRAMUSCULAR; INTRAVENOUS at 18:16

## 2022-01-01 RX ADMIN — CARVEDILOL PHOSPHATE 12.5 MILLIGRAM(S): 80 CAPSULE, EXTENDED RELEASE ORAL at 18:19

## 2022-01-01 RX ADMIN — Medication 6 MILLIGRAM(S): at 21:18

## 2022-01-01 RX ADMIN — Medication 4: at 08:07

## 2022-01-01 RX ADMIN — Medication 100 MILLIGRAM(S): at 05:02

## 2022-01-01 RX ADMIN — Medication 40 MICROGRAM(S): at 21:22

## 2022-01-01 RX ADMIN — VALGANCICLOVIR 450 MILLIGRAM(S): 450 TABLET, FILM COATED ORAL at 13:07

## 2022-01-01 RX ADMIN — Medication 4 MILLIGRAM(S): at 17:39

## 2022-01-01 RX ADMIN — Medication 75 MILLIGRAM(S): at 05:35

## 2022-01-01 RX ADMIN — Medication 6 MILLIGRAM(S): at 21:41

## 2022-01-01 RX ADMIN — ATORVASTATIN CALCIUM 40 MILLIGRAM(S): 80 TABLET, FILM COATED ORAL at 20:08

## 2022-01-01 RX ADMIN — Medication 5 MILLIGRAM(S): at 05:59

## 2022-01-01 RX ADMIN — Medication 650 MILLIGRAM(S): at 08:02

## 2022-01-01 RX ADMIN — Medication 2 DROP(S): at 05:23

## 2022-01-01 RX ADMIN — Medication 2: at 18:07

## 2022-01-01 RX ADMIN — Medication 75 MILLIGRAM(S): at 21:24

## 2022-01-01 RX ADMIN — Medication 1: at 12:47

## 2022-01-01 RX ADMIN — Medication 1300 MILLIGRAM(S): at 06:44

## 2022-01-01 RX ADMIN — PANTOPRAZOLE SODIUM 40 MILLIGRAM(S): 20 TABLET, DELAYED RELEASE ORAL at 09:24

## 2022-01-01 RX ADMIN — Medication 4 MILLIGRAM(S): at 17:53

## 2022-01-01 RX ADMIN — HEPARIN SODIUM 5000 UNIT(S): 5000 INJECTION INTRAVENOUS; SUBCUTANEOUS at 05:36

## 2022-01-01 RX ADMIN — Medication 1 TABLET(S): at 12:24

## 2022-01-01 RX ADMIN — Medication 50 MILLIGRAM(S): at 21:48

## 2022-01-01 RX ADMIN — PANTOPRAZOLE SODIUM 40 MILLIGRAM(S): 20 TABLET, DELAYED RELEASE ORAL at 12:44

## 2022-01-01 RX ADMIN — Medication 3: at 13:05

## 2022-01-01 RX ADMIN — Medication 2: at 18:24

## 2022-01-01 RX ADMIN — BRIVARACETAM 50 MILLIGRAM(S): 25 TABLET, FILM COATED ORAL at 20:09

## 2022-01-01 RX ADMIN — Medication 650 MILLIGRAM(S): at 05:48

## 2022-01-01 RX ADMIN — Medication 4 MILLIGRAM(S): at 05:20

## 2022-01-01 RX ADMIN — Medication 5 UNIT(S): at 12:20

## 2022-01-01 RX ADMIN — PIPERACILLIN AND TAZOBACTAM 25 GRAM(S): 4; .5 INJECTION, POWDER, LYOPHILIZED, FOR SOLUTION INTRAVENOUS at 23:36

## 2022-01-01 RX ADMIN — HEPARIN SODIUM 5000 UNIT(S): 5000 INJECTION INTRAVENOUS; SUBCUTANEOUS at 14:07

## 2022-01-01 RX ADMIN — Medication 500000 UNIT(S): at 17:05

## 2022-01-01 RX ADMIN — CHLORHEXIDINE GLUCONATE 1 APPLICATION(S): 213 SOLUTION TOPICAL at 11:33

## 2022-01-01 RX ADMIN — Medication 6 MILLIGRAM(S): at 21:07

## 2022-01-01 RX ADMIN — Medication 50 MILLIGRAM(S): at 21:06

## 2022-01-01 RX ADMIN — Medication 50 MILLILITER(S): at 01:40

## 2022-01-01 RX ADMIN — Medication 6 MILLIGRAM(S): at 21:58

## 2022-01-01 RX ADMIN — BRIVARACETAM 50 MILLIGRAM(S): 25 TABLET, FILM COATED ORAL at 05:50

## 2022-01-01 RX ADMIN — Medication 100 MILLIGRAM(S): at 14:29

## 2022-01-01 RX ADMIN — ATORVASTATIN CALCIUM 40 MILLIGRAM(S): 80 TABLET, FILM COATED ORAL at 22:00

## 2022-01-01 RX ADMIN — Medication 500000 UNIT(S): at 13:10

## 2022-01-01 RX ADMIN — BRIVARACETAM 50 MILLIGRAM(S): 25 TABLET, FILM COATED ORAL at 17:51

## 2022-01-01 RX ADMIN — Medication 200 MILLIGRAM(S): at 06:55

## 2022-01-01 RX ADMIN — Medication 1300 MILLIGRAM(S): at 05:55

## 2022-01-01 RX ADMIN — HYDROMORPHONE HYDROCHLORIDE 0.5 MILLIGRAM(S): 2 INJECTION INTRAMUSCULAR; INTRAVENOUS; SUBCUTANEOUS at 22:33

## 2022-01-01 RX ADMIN — Medication 50 MICROGRAM(S): at 05:26

## 2022-01-01 RX ADMIN — Medication 2 DROP(S): at 11:35

## 2022-01-01 RX ADMIN — Medication 2 DROP(S): at 23:21

## 2022-01-01 RX ADMIN — Medication 975 MILLIGRAM(S): at 00:48

## 2022-01-01 RX ADMIN — LEVETIRACETAM 250 MILLIGRAM(S): 250 TABLET, FILM COATED ORAL at 06:38

## 2022-01-01 RX ADMIN — FOSPHENYTOIN 108 MILLIGRAM(S) PE: 50 INJECTION INTRAMUSCULAR; INTRAVENOUS at 17:48

## 2022-01-01 RX ADMIN — Medication 500000 UNIT(S): at 21:35

## 2022-01-01 RX ADMIN — Medication 1300 MILLIGRAM(S): at 22:06

## 2022-01-01 RX ADMIN — Medication 100 MILLIGRAM(S): at 05:20

## 2022-01-01 RX ADMIN — Medication 650 MILLIGRAM(S): at 07:44

## 2022-01-01 RX ADMIN — Medication 7 UNIT(S): at 17:42

## 2022-01-01 RX ADMIN — TACROLIMUS 2 MILLIGRAM(S): 5 CAPSULE ORAL at 06:35

## 2022-01-01 RX ADMIN — Medication 500 MILLIGRAM(S): at 06:04

## 2022-01-01 RX ADMIN — MYCOPHENOLATE MOFETIL 1000 MILLIGRAM(S): 250 CAPSULE ORAL at 21:24

## 2022-01-01 RX ADMIN — DIVALPROEX SODIUM 500 MILLIGRAM(S): 500 TABLET, DELAYED RELEASE ORAL at 20:40

## 2022-01-01 RX ADMIN — Medication 5 MILLIGRAM(S): at 05:09

## 2022-01-01 RX ADMIN — POLYETHYLENE GLYCOL 3350 17 GRAM(S): 17 POWDER, FOR SOLUTION ORAL at 11:37

## 2022-01-01 RX ADMIN — HUMAN INSULIN 8 UNIT(S): 100 INJECTION, SUSPENSION SUBCUTANEOUS at 10:36

## 2022-01-01 RX ADMIN — Medication 50 MICROGRAM(S): at 06:07

## 2022-01-01 RX ADMIN — INSULIN HUMAN 3 UNIT(S): 100 INJECTION, SOLUTION SUBCUTANEOUS at 05:56

## 2022-01-01 RX ADMIN — Medication 50 MILLIEQUIVALENT(S): at 06:52

## 2022-01-01 RX ADMIN — CHLORHEXIDINE GLUCONATE 1 APPLICATION(S): 213 SOLUTION TOPICAL at 05:05

## 2022-01-01 RX ADMIN — Medication 75 MILLIGRAM(S): at 22:12

## 2022-01-01 RX ADMIN — INSULIN GLARGINE 12 UNIT(S): 100 INJECTION, SOLUTION SUBCUTANEOUS at 21:51

## 2022-01-01 RX ADMIN — SIROLIMUS 7 MILLIGRAM(S): 1 SOLUTION ORAL at 08:06

## 2022-01-01 RX ADMIN — MYCOPHENOLATE MOFETIL 1000 MILLIGRAM(S): 250 CAPSULE ORAL at 20:00

## 2022-01-01 RX ADMIN — Medication 5 MILLIGRAM(S): at 06:43

## 2022-01-01 RX ADMIN — Medication 10 MILLIGRAM(S): at 19:45

## 2022-01-01 RX ADMIN — ATORVASTATIN CALCIUM 40 MILLIGRAM(S): 80 TABLET, FILM COATED ORAL at 21:36

## 2022-01-01 RX ADMIN — BRIVARACETAM 50 MILLIGRAM(S): 25 TABLET, FILM COATED ORAL at 21:15

## 2022-01-01 RX ADMIN — Medication 2 DROP(S): at 06:16

## 2022-01-01 RX ADMIN — FLUCONAZOLE 100 MILLIGRAM(S): 150 TABLET ORAL at 15:30

## 2022-01-01 RX ADMIN — Medication 7 UNIT(S): at 21:22

## 2022-01-01 RX ADMIN — MAGNESIUM OXIDE 400 MG ORAL TABLET 400 MILLIGRAM(S): 241.3 TABLET ORAL at 21:57

## 2022-01-01 RX ADMIN — FINASTERIDE 5 MILLIGRAM(S): 5 TABLET, FILM COATED ORAL at 11:17

## 2022-01-01 RX ADMIN — Medication 200 MILLIGRAM(S): at 17:12

## 2022-01-01 RX ADMIN — BRIVARACETAM 220 MILLIGRAM(S): 25 TABLET, FILM COATED ORAL at 06:44

## 2022-01-01 RX ADMIN — Medication 200 MILLIGRAM(S): at 20:14

## 2022-01-01 RX ADMIN — Medication 5 MILLIGRAM(S): at 05:41

## 2022-01-01 RX ADMIN — SENNA PLUS 2 TABLET(S): 8.6 TABLET ORAL at 21:19

## 2022-01-01 RX ADMIN — Medication 6 MILLIGRAM(S): at 22:59

## 2022-01-01 RX ADMIN — Medication 100 MILLIGRAM(S): at 21:36

## 2022-01-01 RX ADMIN — BRIVARACETAM 75 MILLIGRAM(S): 25 TABLET, FILM COATED ORAL at 00:11

## 2022-01-01 RX ADMIN — POLYETHYLENE GLYCOL 3350 17 GRAM(S): 17 POWDER, FOR SOLUTION ORAL at 11:47

## 2022-01-01 RX ADMIN — SIROLIMUS 7 MILLIGRAM(S): 1 SOLUTION ORAL at 08:23

## 2022-01-01 RX ADMIN — Medication 500000 UNIT(S): at 12:56

## 2022-01-01 RX ADMIN — Medication 1 TABLET(S): at 11:44

## 2022-01-01 RX ADMIN — Medication 50 MICROGRAM(S): at 05:52

## 2022-01-01 RX ADMIN — SEVELAMER CARBONATE 800 MILLIGRAM(S): 2400 POWDER, FOR SUSPENSION ORAL at 08:44

## 2022-01-01 RX ADMIN — Medication 500000 UNIT(S): at 13:28

## 2022-01-01 RX ADMIN — Medication 1300 MILLIGRAM(S): at 08:13

## 2022-01-01 RX ADMIN — SIROLIMUS 5 MILLIGRAM(S): 1 SOLUTION ORAL at 08:38

## 2022-01-01 RX ADMIN — Medication 100 MILLIGRAM(S): at 14:45

## 2022-01-01 RX ADMIN — MYCOPHENOLATE MOFETIL 500 MILLIGRAM(S): 250 CAPSULE ORAL at 08:23

## 2022-01-01 RX ADMIN — Medication 4 MILLIGRAM(S): at 05:40

## 2022-01-01 RX ADMIN — CHLORHEXIDINE GLUCONATE 1 APPLICATION(S): 213 SOLUTION TOPICAL at 08:35

## 2022-01-01 RX ADMIN — FINASTERIDE 5 MILLIGRAM(S): 5 TABLET, FILM COATED ORAL at 12:35

## 2022-01-01 RX ADMIN — WARFARIN SODIUM 5 MILLIGRAM(S): 2.5 TABLET ORAL at 22:10

## 2022-01-01 RX ADMIN — Medication 1: at 22:50

## 2022-01-01 RX ADMIN — Medication 100 MILLIGRAM(S): at 14:50

## 2022-01-01 RX ADMIN — Medication 2 DROP(S): at 12:09

## 2022-01-01 RX ADMIN — MYCOPHENOLATE MOFETIL 500 MILLIGRAM(S): 250 CAPSULE ORAL at 20:25

## 2022-01-01 RX ADMIN — Medication 50 MILLILITER(S): at 10:20

## 2022-01-01 RX ADMIN — ATORVASTATIN CALCIUM 40 MILLIGRAM(S): 80 TABLET, FILM COATED ORAL at 21:15

## 2022-01-01 RX ADMIN — Medication 75 MILLIGRAM(S): at 21:43

## 2022-01-01 RX ADMIN — SENNA PLUS 10 MILLILITER(S): 8.6 TABLET ORAL at 11:38

## 2022-01-01 RX ADMIN — Medication 50 MILLIGRAM(S): at 05:04

## 2022-01-01 RX ADMIN — Medication 975 MILLIGRAM(S): at 23:15

## 2022-01-01 RX ADMIN — FLUCONAZOLE 200 MILLIGRAM(S): 150 TABLET ORAL at 12:02

## 2022-01-01 RX ADMIN — Medication 650 MILLIGRAM(S): at 01:38

## 2022-01-01 RX ADMIN — Medication 4 UNIT(S): at 13:53

## 2022-01-01 RX ADMIN — Medication 80 MILLIGRAM(S): at 17:54

## 2022-01-01 RX ADMIN — CARVEDILOL PHOSPHATE 3.12 MILLIGRAM(S): 80 CAPSULE, EXTENDED RELEASE ORAL at 05:46

## 2022-01-01 RX ADMIN — LEVETIRACETAM 250 MILLIGRAM(S): 250 TABLET, FILM COATED ORAL at 05:13

## 2022-01-01 RX ADMIN — Medication 80 MILLIGRAM(S): at 18:01

## 2022-01-01 RX ADMIN — INSULIN GLARGINE 5 UNIT(S): 100 INJECTION, SOLUTION SUBCUTANEOUS at 21:54

## 2022-01-01 RX ADMIN — Medication 2 DROP(S): at 11:48

## 2022-01-01 RX ADMIN — Medication 8 UNIT(S): at 08:46

## 2022-01-01 RX ADMIN — Medication 3 UNIT(S): at 08:31

## 2022-01-01 RX ADMIN — Medication 6 MILLIGRAM(S): at 21:30

## 2022-01-01 RX ADMIN — VALGANCICLOVIR 450 MILLIGRAM(S): 450 TABLET, FILM COATED ORAL at 11:36

## 2022-01-01 RX ADMIN — Medication 50 MICROGRAM(S): at 06:13

## 2022-01-01 RX ADMIN — MYCOPHENOLATE MOFETIL 500 MILLIGRAM(S): 250 CAPSULE ORAL at 08:49

## 2022-01-01 RX ADMIN — Medication 50 MICROGRAM(S): at 05:09

## 2022-01-01 RX ADMIN — PANTOPRAZOLE SODIUM 40 MILLIGRAM(S): 20 TABLET, DELAYED RELEASE ORAL at 09:37

## 2022-01-01 RX ADMIN — Medication 4 MILLIGRAM(S): at 17:42

## 2022-01-01 RX ADMIN — Medication 4 MILLIGRAM(S): at 05:42

## 2022-01-01 RX ADMIN — SENNA PLUS 2 TABLET(S): 8.6 TABLET ORAL at 22:04

## 2022-01-01 RX ADMIN — Medication 500000 UNIT(S): at 00:44

## 2022-01-01 RX ADMIN — FINASTERIDE 5 MILLIGRAM(S): 5 TABLET, FILM COATED ORAL at 13:12

## 2022-01-01 RX ADMIN — PANTOPRAZOLE SODIUM 40 MILLIGRAM(S): 20 TABLET, DELAYED RELEASE ORAL at 08:37

## 2022-01-01 RX ADMIN — MYCOPHENOLATE MOFETIL 500 MILLIGRAM(S): 250 CAPSULE ORAL at 21:54

## 2022-01-01 RX ADMIN — Medication 1: at 08:39

## 2022-01-01 RX ADMIN — SEVELAMER CARBONATE 800 MILLIGRAM(S): 2400 POWDER, FOR SUSPENSION ORAL at 08:12

## 2022-01-01 RX ADMIN — Medication 2 DROP(S): at 11:53

## 2022-01-01 RX ADMIN — SEVELAMER CARBONATE 800 MILLIGRAM(S): 2400 POWDER, FOR SUSPENSION ORAL at 06:43

## 2022-01-01 RX ADMIN — FOSPHENYTOIN 104 MILLIGRAM(S) PE: 50 INJECTION INTRAMUSCULAR; INTRAVENOUS at 15:33

## 2022-01-01 RX ADMIN — Medication 1: at 17:17

## 2022-01-01 RX ADMIN — INSULIN GLARGINE 7 UNIT(S): 100 INJECTION, SOLUTION SUBCUTANEOUS at 22:08

## 2022-01-01 RX ADMIN — Medication 500000 UNIT(S): at 12:05

## 2022-01-01 RX ADMIN — LACOSAMIDE 100 MILLIGRAM(S): 50 TABLET ORAL at 15:01

## 2022-01-01 RX ADMIN — FOSPHENYTOIN 108 MILLIGRAM(S) PE: 50 INJECTION INTRAMUSCULAR; INTRAVENOUS at 22:14

## 2022-01-01 RX ADMIN — PANTOPRAZOLE SODIUM 40 MILLIGRAM(S): 20 TABLET, DELAYED RELEASE ORAL at 11:05

## 2022-01-01 RX ADMIN — CARVEDILOL PHOSPHATE 12.5 MILLIGRAM(S): 80 CAPSULE, EXTENDED RELEASE ORAL at 17:36

## 2022-01-01 RX ADMIN — PANTOPRAZOLE SODIUM 40 MILLIGRAM(S): 20 TABLET, DELAYED RELEASE ORAL at 09:20

## 2022-01-01 RX ADMIN — CARVEDILOL PHOSPHATE 12.5 MILLIGRAM(S): 80 CAPSULE, EXTENDED RELEASE ORAL at 17:14

## 2022-01-01 RX ADMIN — TACROLIMUS 3 MILLIGRAM(S): 5 CAPSULE ORAL at 07:54

## 2022-01-01 RX ADMIN — TACROLIMUS 1 MILLIGRAM(S): 5 CAPSULE ORAL at 20:29

## 2022-01-01 RX ADMIN — Medication 1 TABLET(S): at 11:51

## 2022-01-01 RX ADMIN — BRIVARACETAM 50 MILLIGRAM(S): 25 TABLET, FILM COATED ORAL at 20:41

## 2022-01-01 RX ADMIN — Medication 10 MILLIGRAM(S): at 02:06

## 2022-01-01 RX ADMIN — SEVELAMER CARBONATE 800 MILLIGRAM(S): 2400 POWDER, FOR SUSPENSION ORAL at 12:07

## 2022-01-01 RX ADMIN — Medication 4 UNIT(S): at 08:09

## 2022-01-01 RX ADMIN — WARFARIN SODIUM 3 MILLIGRAM(S): 2.5 TABLET ORAL at 21:40

## 2022-01-01 RX ADMIN — Medication 5 UNIT(S): at 17:15

## 2022-01-01 RX ADMIN — Medication 1 TABLET(S): at 11:07

## 2022-01-01 RX ADMIN — Medication 5 MILLIGRAM(S): at 05:26

## 2022-01-01 RX ADMIN — Medication 1: at 08:43

## 2022-01-01 RX ADMIN — ATORVASTATIN CALCIUM 40 MILLIGRAM(S): 80 TABLET, FILM COATED ORAL at 20:35

## 2022-01-01 RX ADMIN — Medication 2: at 09:22

## 2022-01-01 RX ADMIN — Medication 8 UNIT(S): at 08:41

## 2022-01-01 RX ADMIN — Medication 10 MILLIGRAM(S): at 01:14

## 2022-01-01 RX ADMIN — Medication 60 MILLIGRAM(S): at 06:03

## 2022-01-01 RX ADMIN — Medication 10 MILLIGRAM(S): at 06:46

## 2022-01-01 RX ADMIN — Medication 650 MILLIGRAM(S): at 21:31

## 2022-01-01 RX ADMIN — LEVETIRACETAM 250 MILLIGRAM(S): 250 TABLET, FILM COATED ORAL at 05:27

## 2022-01-01 RX ADMIN — Medication 9 UNIT(S): at 17:42

## 2022-01-01 RX ADMIN — Medication 500000 UNIT(S): at 06:19

## 2022-01-01 RX ADMIN — Medication 4 MILLIGRAM(S): at 05:58

## 2022-01-01 RX ADMIN — DIVALPROEX SODIUM 1000 MILLIGRAM(S): 500 TABLET, DELAYED RELEASE ORAL at 05:34

## 2022-01-01 RX ADMIN — ERYTHROPOIETIN 10000 UNIT(S): 10000 INJECTION, SOLUTION INTRAVENOUS; SUBCUTANEOUS at 22:09

## 2022-01-01 RX ADMIN — Medication 7 UNIT(S): at 18:00

## 2022-01-01 RX ADMIN — FINASTERIDE 5 MILLIGRAM(S): 5 TABLET, FILM COATED ORAL at 11:27

## 2022-01-01 RX ADMIN — APIXABAN 2.5 MILLIGRAM(S): 2.5 TABLET, FILM COATED ORAL at 05:52

## 2022-01-01 RX ADMIN — SEVELAMER CARBONATE 800 MILLIGRAM(S): 2400 POWDER, FOR SUSPENSION ORAL at 17:17

## 2022-01-01 RX ADMIN — APIXABAN 5 MILLIGRAM(S): 2.5 TABLET, FILM COATED ORAL at 05:42

## 2022-01-01 RX ADMIN — SEVELAMER CARBONATE 800 MILLIGRAM(S): 2400 POWDER, FOR SUSPENSION ORAL at 13:23

## 2022-01-01 RX ADMIN — ERYTHROPOIETIN 10000 UNIT(S): 10000 INJECTION, SOLUTION INTRAVENOUS; SUBCUTANEOUS at 14:36

## 2022-01-01 RX ADMIN — TACROLIMUS 1 MILLIGRAM(S): 5 CAPSULE ORAL at 08:15

## 2022-01-01 RX ADMIN — Medication 25 MILLIGRAM(S): at 06:40

## 2022-01-01 RX ADMIN — CHLORHEXIDINE GLUCONATE 1 APPLICATION(S): 213 SOLUTION TOPICAL at 05:17

## 2022-01-01 RX ADMIN — MYCOPHENOLATE MOFETIL 250 MILLIGRAM(S): 250 CAPSULE ORAL at 08:20

## 2022-01-01 RX ADMIN — CARVEDILOL PHOSPHATE 3.12 MILLIGRAM(S): 80 CAPSULE, EXTENDED RELEASE ORAL at 10:33

## 2022-01-01 RX ADMIN — BRIVARACETAM 50 MILLIGRAM(S): 25 TABLET, FILM COATED ORAL at 17:41

## 2022-01-01 RX ADMIN — Medication 500000 UNIT(S): at 23:22

## 2022-01-01 RX ADMIN — CHLORHEXIDINE GLUCONATE 1 APPLICATION(S): 213 SOLUTION TOPICAL at 12:20

## 2022-01-01 RX ADMIN — Medication 200 MILLIGRAM(S): at 17:27

## 2022-01-01 RX ADMIN — LEVETIRACETAM 250 MILLIGRAM(S): 250 TABLET, FILM COATED ORAL at 22:22

## 2022-01-01 RX ADMIN — LEVETIRACETAM 250 MILLIGRAM(S): 250 TABLET, FILM COATED ORAL at 17:16

## 2022-01-01 RX ADMIN — Medication 50 MILLIGRAM(S): at 15:23

## 2022-01-01 RX ADMIN — SIROLIMUS 7 MILLIGRAM(S): 1 SOLUTION ORAL at 07:57

## 2022-01-01 RX ADMIN — MAGNESIUM OXIDE 400 MG ORAL TABLET 400 MILLIGRAM(S): 241.3 TABLET ORAL at 21:38

## 2022-01-01 RX ADMIN — SEVELAMER CARBONATE 800 MILLIGRAM(S): 2400 POWDER, FOR SUSPENSION ORAL at 08:08

## 2022-01-01 RX ADMIN — ATORVASTATIN CALCIUM 40 MILLIGRAM(S): 80 TABLET, FILM COATED ORAL at 21:05

## 2022-01-01 RX ADMIN — VALGANCICLOVIR 450 MILLIGRAM(S): 450 TABLET, FILM COATED ORAL at 12:16

## 2022-01-01 RX ADMIN — FINASTERIDE 5 MILLIGRAM(S): 5 TABLET, FILM COATED ORAL at 11:13

## 2022-01-01 RX ADMIN — SIROLIMUS 1 MILLIGRAM(S): 1 SOLUTION ORAL at 15:14

## 2022-01-01 RX ADMIN — FOSPHENYTOIN 104 MILLIGRAM(S) PE: 50 INJECTION INTRAMUSCULAR; INTRAVENOUS at 17:05

## 2022-01-01 RX ADMIN — CARVEDILOL PHOSPHATE 12.5 MILLIGRAM(S): 80 CAPSULE, EXTENDED RELEASE ORAL at 05:24

## 2022-01-01 RX ADMIN — INSULIN GLARGINE 12 UNIT(S): 100 INJECTION, SOLUTION SUBCUTANEOUS at 21:17

## 2022-01-01 RX ADMIN — SEVELAMER CARBONATE 800 MILLIGRAM(S): 2400 POWDER, FOR SUSPENSION ORAL at 12:17

## 2022-01-01 RX ADMIN — ATORVASTATIN CALCIUM 40 MILLIGRAM(S): 80 TABLET, FILM COATED ORAL at 22:09

## 2022-01-01 RX ADMIN — Medication 500000 UNIT(S): at 06:22

## 2022-01-01 RX ADMIN — HUMAN INSULIN 2 UNIT(S): 100 INJECTION, SUSPENSION SUBCUTANEOUS at 23:12

## 2022-01-01 RX ADMIN — FINASTERIDE 5 MILLIGRAM(S): 5 TABLET, FILM COATED ORAL at 12:27

## 2022-01-01 RX ADMIN — Medication 3: at 17:13

## 2022-01-01 RX ADMIN — Medication 80 MILLIGRAM(S): at 09:05

## 2022-01-01 RX ADMIN — Medication 100 MILLIGRAM(S): at 12:31

## 2022-01-01 RX ADMIN — Medication 6 MILLIGRAM(S): at 01:46

## 2022-01-01 RX ADMIN — HEPARIN SODIUM 5000 UNIT(S): 5000 INJECTION INTRAVENOUS; SUBCUTANEOUS at 13:00

## 2022-01-01 RX ADMIN — Medication 30 MILLIGRAM(S): at 22:43

## 2022-01-01 RX ADMIN — Medication 9 UNIT(S): at 09:05

## 2022-01-01 RX ADMIN — Medication 60 MILLIGRAM(S): at 05:35

## 2022-01-01 RX ADMIN — CARVEDILOL PHOSPHATE 3.12 MILLIGRAM(S): 80 CAPSULE, EXTENDED RELEASE ORAL at 06:43

## 2022-01-01 RX ADMIN — MYCOPHENOLATE MOFETIL 500 MILLIGRAM(S): 250 CAPSULE ORAL at 22:26

## 2022-01-01 RX ADMIN — Medication 500 MILLIGRAM(S): at 18:18

## 2022-01-01 RX ADMIN — Medication 500000 UNIT(S): at 17:33

## 2022-01-01 RX ADMIN — Medication 650 MILLIGRAM(S): at 09:05

## 2022-01-01 RX ADMIN — Medication 1300 MILLIGRAM(S): at 05:44

## 2022-01-01 RX ADMIN — SENNA PLUS 10 MILLILITER(S): 8.6 TABLET ORAL at 21:11

## 2022-01-01 RX ADMIN — Medication 500 MILLIGRAM(S): at 05:44

## 2022-01-01 RX ADMIN — Medication 200 MILLIGRAM(S): at 06:13

## 2022-01-01 RX ADMIN — POLYETHYLENE GLYCOL 3350 17 GRAM(S): 17 POWDER, FOR SOLUTION ORAL at 12:27

## 2022-01-01 RX ADMIN — Medication 80 MILLIGRAM(S): at 05:23

## 2022-01-01 RX ADMIN — MONTELUKAST 10 MILLIGRAM(S): 4 TABLET, CHEWABLE ORAL at 11:49

## 2022-01-01 RX ADMIN — MONTELUKAST 10 MILLIGRAM(S): 4 TABLET, CHEWABLE ORAL at 13:37

## 2022-01-01 RX ADMIN — Medication 1300 MILLIGRAM(S): at 18:40

## 2022-01-01 RX ADMIN — Medication 4 MILLIGRAM(S): at 21:55

## 2022-01-01 RX ADMIN — DIVALPROEX SODIUM 1000 MILLIGRAM(S): 500 TABLET, DELAYED RELEASE ORAL at 17:03

## 2022-01-01 RX ADMIN — PANTOPRAZOLE SODIUM 40 MILLIGRAM(S): 20 TABLET, DELAYED RELEASE ORAL at 11:55

## 2022-01-01 RX ADMIN — DIVALPROEX SODIUM 250 MILLIGRAM(S): 500 TABLET, DELAYED RELEASE ORAL at 12:02

## 2022-01-01 RX ADMIN — INSULIN GLARGINE 6 UNIT(S): 100 INJECTION, SOLUTION SUBCUTANEOUS at 21:52

## 2022-01-01 RX ADMIN — Medication 5 MILLIGRAM(S): at 05:25

## 2022-01-01 RX ADMIN — POLYETHYLENE GLYCOL 3350 17 GRAM(S): 17 POWDER, FOR SOLUTION ORAL at 11:26

## 2022-01-01 RX ADMIN — CARVEDILOL PHOSPHATE 25 MILLIGRAM(S): 80 CAPSULE, EXTENDED RELEASE ORAL at 18:19

## 2022-01-01 RX ADMIN — MYCOPHENOLATE MOFETIL 500 MILLIGRAM(S): 250 CAPSULE ORAL at 21:08

## 2022-01-01 RX ADMIN — Medication 2: at 00:58

## 2022-01-01 RX ADMIN — Medication 1300 MILLIGRAM(S): at 05:46

## 2022-01-01 RX ADMIN — Medication 4 MILLIGRAM(S): at 06:17

## 2022-01-01 RX ADMIN — MYCOPHENOLATE MOFETIL 500 MILLIGRAM(S): 250 CAPSULE ORAL at 07:57

## 2022-01-01 RX ADMIN — BRIVARACETAM 50 MILLIGRAM(S): 25 TABLET, FILM COATED ORAL at 20:22

## 2022-01-01 RX ADMIN — MAGNESIUM OXIDE 400 MG ORAL TABLET 400 MILLIGRAM(S): 241.3 TABLET ORAL at 22:05

## 2022-01-01 RX ADMIN — FOSPHENYTOIN 104 MILLIGRAM(S) PE: 50 INJECTION INTRAMUSCULAR; INTRAVENOUS at 08:22

## 2022-01-01 RX ADMIN — Medication 25 MILLIGRAM(S): at 22:39

## 2022-01-01 RX ADMIN — INSULIN HUMAN 3 UNIT(S)/HR: 100 INJECTION, SOLUTION SUBCUTANEOUS at 08:14

## 2022-01-01 RX ADMIN — Medication 500000 UNIT(S): at 17:48

## 2022-01-01 RX ADMIN — MEROPENEM 100 MILLIGRAM(S): 1 INJECTION INTRAVENOUS at 17:39

## 2022-01-01 RX ADMIN — FLUCONAZOLE 200 MILLIGRAM(S): 150 TABLET ORAL at 11:48

## 2022-01-01 RX ADMIN — MYCOPHENOLATE MOFETIL 1000 MILLIGRAM(S): 250 CAPSULE ORAL at 20:03

## 2022-01-01 RX ADMIN — Medication 4: at 17:29

## 2022-01-01 RX ADMIN — Medication 8: at 17:47

## 2022-01-01 RX ADMIN — FINASTERIDE 5 MILLIGRAM(S): 5 TABLET, FILM COATED ORAL at 12:25

## 2022-01-01 RX ADMIN — Medication 100 MILLIGRAM(S): at 12:28

## 2022-01-01 RX ADMIN — Medication 10 MILLIGRAM(S): at 03:08

## 2022-01-01 RX ADMIN — Medication 20 MILLIGRAM(S): at 05:19

## 2022-01-01 RX ADMIN — Medication 500000 UNIT(S): at 11:57

## 2022-01-01 RX ADMIN — BRIVARACETAM 50 MILLIGRAM(S): 25 TABLET, FILM COATED ORAL at 06:06

## 2022-01-01 RX ADMIN — MYCOPHENOLATE MOFETIL 500 MILLIGRAM(S): 250 CAPSULE ORAL at 20:07

## 2022-01-01 RX ADMIN — MAGNESIUM OXIDE 400 MG ORAL TABLET 400 MILLIGRAM(S): 241.3 TABLET ORAL at 05:44

## 2022-01-01 RX ADMIN — Medication 1300 MILLIGRAM(S): at 13:15

## 2022-01-01 RX ADMIN — Medication 100 MILLIGRAM(S): at 05:05

## 2022-01-01 RX ADMIN — PANTOPRAZOLE SODIUM 40 MILLIGRAM(S): 20 TABLET, DELAYED RELEASE ORAL at 13:18

## 2022-01-01 RX ADMIN — INSULIN GLARGINE 7 UNIT(S): 100 INJECTION, SOLUTION SUBCUTANEOUS at 21:37

## 2022-01-01 RX ADMIN — Medication 2 DROP(S): at 13:32

## 2022-01-01 RX ADMIN — Medication 2: at 23:09

## 2022-01-01 RX ADMIN — APIXABAN 5 MILLIGRAM(S): 2.5 TABLET, FILM COATED ORAL at 17:10

## 2022-01-01 RX ADMIN — Medication 2 DROP(S): at 00:11

## 2022-01-01 RX ADMIN — Medication 50 MILLIEQUIVALENT(S): at 20:32

## 2022-01-01 RX ADMIN — Medication 975 MILLIGRAM(S): at 05:00

## 2022-01-01 RX ADMIN — SEVELAMER CARBONATE 800 MILLIGRAM(S): 2400 POWDER, FOR SUSPENSION ORAL at 17:38

## 2022-01-01 RX ADMIN — Medication 2: at 08:48

## 2022-01-01 RX ADMIN — Medication 6 MILLIGRAM(S): at 22:04

## 2022-01-01 RX ADMIN — Medication 60 MILLIGRAM(S): at 17:39

## 2022-01-01 RX ADMIN — PANTOPRAZOLE SODIUM 40 MILLIGRAM(S): 20 TABLET, DELAYED RELEASE ORAL at 17:41

## 2022-01-01 RX ADMIN — SODIUM ZIRCONIUM CYCLOSILICATE 10 GRAM(S): 10 POWDER, FOR SUSPENSION ORAL at 15:14

## 2022-01-01 RX ADMIN — FLUCONAZOLE 100 MILLIGRAM(S): 150 TABLET ORAL at 08:01

## 2022-01-01 RX ADMIN — Medication 2: at 12:21

## 2022-01-01 RX ADMIN — Medication 1: at 21:57

## 2022-01-01 RX ADMIN — Medication 650 MILLIGRAM(S): at 09:03

## 2022-01-01 RX ADMIN — Medication 650 MILLIGRAM(S): at 07:52

## 2022-01-01 RX ADMIN — SEVELAMER CARBONATE 800 MILLIGRAM(S): 2400 POWDER, FOR SUSPENSION ORAL at 12:13

## 2022-01-01 RX ADMIN — Medication 6 MILLIGRAM(S): at 21:35

## 2022-01-01 RX ADMIN — Medication 500000 UNIT(S): at 17:46

## 2022-01-01 RX ADMIN — HEPARIN SODIUM 5000 UNIT(S): 5000 INJECTION INTRAVENOUS; SUBCUTANEOUS at 14:53

## 2022-01-01 RX ADMIN — MYCOPHENOLATE MOFETIL 1 GRAM(S): 250 CAPSULE ORAL at 07:39

## 2022-01-01 RX ADMIN — Medication 200 MILLIGRAM(S): at 18:01

## 2022-01-01 RX ADMIN — Medication 650 MILLIGRAM(S): at 12:00

## 2022-01-01 RX ADMIN — Medication 80 MILLIGRAM(S): at 17:26

## 2022-01-01 RX ADMIN — CHLORHEXIDINE GLUCONATE 1 APPLICATION(S): 213 SOLUTION TOPICAL at 07:18

## 2022-01-01 RX ADMIN — Medication 667 MILLIGRAM(S): at 13:08

## 2022-01-01 RX ADMIN — MYCOPHENOLATE MOFETIL 250 MILLIGRAM(S): 250 CAPSULE ORAL at 20:38

## 2022-01-01 RX ADMIN — Medication 1300 MILLIGRAM(S): at 21:40

## 2022-01-01 RX ADMIN — Medication 500000 UNIT(S): at 23:45

## 2022-01-01 RX ADMIN — INSULIN HUMAN 5 UNIT(S): 100 INJECTION, SOLUTION SUBCUTANEOUS at 10:20

## 2022-01-01 RX ADMIN — Medication 80 MILLIGRAM(S): at 06:17

## 2022-01-01 RX ADMIN — Medication 75 MILLIGRAM(S): at 06:15

## 2022-01-01 RX ADMIN — LACOSAMIDE 120 MILLIGRAM(S): 50 TABLET ORAL at 20:21

## 2022-01-01 RX ADMIN — Medication 1: at 08:17

## 2022-01-01 RX ADMIN — CHLORHEXIDINE GLUCONATE 1 APPLICATION(S): 213 SOLUTION TOPICAL at 05:03

## 2022-01-01 RX ADMIN — Medication 500 MILLIGRAM(S): at 17:24

## 2022-01-01 RX ADMIN — Medication 500000 UNIT(S): at 11:16

## 2022-01-01 RX ADMIN — Medication 80 MILLIGRAM(S): at 18:27

## 2022-01-01 RX ADMIN — Medication 25 MILLIGRAM(S): at 05:31

## 2022-01-01 RX ADMIN — Medication 200 MILLIGRAM(S): at 05:22

## 2022-01-01 RX ADMIN — POLYETHYLENE GLYCOL 3350 17 GRAM(S): 17 POWDER, FOR SOLUTION ORAL at 11:41

## 2022-01-01 RX ADMIN — Medication 650 MILLIGRAM(S): at 18:00

## 2022-01-01 RX ADMIN — MYCOPHENOLATE MOFETIL 500 MILLIGRAM(S): 250 CAPSULE ORAL at 08:08

## 2022-01-01 RX ADMIN — Medication 8 MILLIGRAM(S): at 21:00

## 2022-01-01 RX ADMIN — BRIVARACETAM 50 MILLIGRAM(S): 25 TABLET, FILM COATED ORAL at 17:53

## 2022-01-01 RX ADMIN — Medication 500000 UNIT(S): at 00:37

## 2022-01-01 RX ADMIN — Medication 100 MILLIGRAM(S): at 12:26

## 2022-01-01 RX ADMIN — ERYTHROPOIETIN 10000 UNIT(S): 10000 INJECTION, SOLUTION INTRAVENOUS; SUBCUTANEOUS at 19:55

## 2022-01-01 RX ADMIN — Medication 5 MILLIGRAM(S): at 20:30

## 2022-01-01 RX ADMIN — SIROLIMUS 7 MILLIGRAM(S): 1 SOLUTION ORAL at 08:25

## 2022-01-01 RX ADMIN — HEPARIN SODIUM 11.5 UNIT(S)/HR: 5000 INJECTION INTRAVENOUS; SUBCUTANEOUS at 04:39

## 2022-01-01 RX ADMIN — Medication 4 MILLIGRAM(S): at 18:14

## 2022-01-01 RX ADMIN — AMLODIPINE BESYLATE 10 MILLIGRAM(S): 2.5 TABLET ORAL at 09:11

## 2022-01-01 RX ADMIN — POLYETHYLENE GLYCOL 3350 17 GRAM(S): 17 POWDER, FOR SOLUTION ORAL at 12:26

## 2022-01-01 RX ADMIN — BRIVARACETAM 50 MILLIGRAM(S): 25 TABLET, FILM COATED ORAL at 20:40

## 2022-01-01 RX ADMIN — Medication 100 MILLIGRAM(S): at 05:16

## 2022-01-01 RX ADMIN — Medication 80 MILLIGRAM(S): at 17:43

## 2022-01-01 RX ADMIN — ATORVASTATIN CALCIUM 40 MILLIGRAM(S): 80 TABLET, FILM COATED ORAL at 21:13

## 2022-01-01 RX ADMIN — VALGANCICLOVIR 450 MILLIGRAM(S): 450 TABLET, FILM COATED ORAL at 12:07

## 2022-01-01 RX ADMIN — TACROLIMUS 2 MILLIGRAM(S): 5 CAPSULE ORAL at 08:30

## 2022-01-01 RX ADMIN — HEPARIN SODIUM 5000 UNIT(S): 5000 INJECTION INTRAVENOUS; SUBCUTANEOUS at 06:00

## 2022-01-01 RX ADMIN — FINASTERIDE 5 MILLIGRAM(S): 5 TABLET, FILM COATED ORAL at 11:25

## 2022-01-01 RX ADMIN — Medication 1 TABLET(S): at 11:56

## 2022-01-01 RX ADMIN — Medication 3: at 13:00

## 2022-01-01 RX ADMIN — PANTOPRAZOLE SODIUM 40 MILLIGRAM(S): 20 TABLET, DELAYED RELEASE ORAL at 11:03

## 2022-01-01 RX ADMIN — PANTOPRAZOLE SODIUM 40 MILLIGRAM(S): 20 TABLET, DELAYED RELEASE ORAL at 05:52

## 2022-01-01 RX ADMIN — Medication 4 MILLIGRAM(S): at 05:10

## 2022-01-01 RX ADMIN — CHLORHEXIDINE GLUCONATE 1 APPLICATION(S): 213 SOLUTION TOPICAL at 05:27

## 2022-01-01 RX ADMIN — IRON SUCROSE 100 MILLIGRAM(S): 20 INJECTION, SOLUTION INTRAVENOUS at 18:28

## 2022-01-01 RX ADMIN — Medication 50 MICROGRAM(S): at 05:12

## 2022-01-01 RX ADMIN — Medication 500000 UNIT(S): at 06:01

## 2022-01-01 RX ADMIN — Medication 1 TABLET(S): at 12:34

## 2022-01-01 RX ADMIN — Medication 4 UNIT(S): at 17:39

## 2022-01-01 RX ADMIN — Medication 650 MILLIGRAM(S): at 08:44

## 2022-01-01 RX ADMIN — ATORVASTATIN CALCIUM 40 MILLIGRAM(S): 80 TABLET, FILM COATED ORAL at 22:07

## 2022-01-01 RX ADMIN — Medication 5 MILLIGRAM(S): at 21:13

## 2022-01-01 RX ADMIN — MYCOPHENOLATE MOFETIL 250 MILLIGRAM(S): 250 CAPSULE ORAL at 09:03

## 2022-01-01 RX ADMIN — Medication 100 MILLIGRAM(S): at 13:35

## 2022-01-01 RX ADMIN — Medication 2: at 12:49

## 2022-01-01 RX ADMIN — Medication 1 TABLET(S): at 11:41

## 2022-01-01 RX ADMIN — CARVEDILOL PHOSPHATE 3.12 MILLIGRAM(S): 80 CAPSULE, EXTENDED RELEASE ORAL at 05:59

## 2022-01-01 RX ADMIN — Medication 5: at 17:42

## 2022-01-01 RX ADMIN — BRIVARACETAM 50 MILLIGRAM(S): 25 TABLET, FILM COATED ORAL at 17:28

## 2022-01-01 RX ADMIN — Medication 4 MILLIGRAM(S): at 06:40

## 2022-01-01 RX ADMIN — Medication 500000 UNIT(S): at 06:12

## 2022-01-01 RX ADMIN — Medication 650 MILLIGRAM(S): at 23:23

## 2022-01-01 RX ADMIN — TACROLIMUS 0.5 MILLIGRAM(S): 5 CAPSULE ORAL at 20:00

## 2022-01-01 RX ADMIN — SIROLIMUS 7 MILLIGRAM(S): 1 SOLUTION ORAL at 08:31

## 2022-01-01 RX ADMIN — CARVEDILOL PHOSPHATE 25 MILLIGRAM(S): 80 CAPSULE, EXTENDED RELEASE ORAL at 17:48

## 2022-01-01 RX ADMIN — HEPARIN SODIUM 5000 UNIT(S): 5000 INJECTION INTRAVENOUS; SUBCUTANEOUS at 06:06

## 2022-01-01 RX ADMIN — ENOXAPARIN SODIUM 60 MILLIGRAM(S): 100 INJECTION SUBCUTANEOUS at 08:03

## 2022-01-01 RX ADMIN — BRIVARACETAM 50 MILLIGRAM(S): 25 TABLET, FILM COATED ORAL at 17:52

## 2022-01-01 RX ADMIN — Medication 5 UNIT(S): at 12:30

## 2022-01-01 RX ADMIN — Medication 200 MILLIGRAM(S): at 05:47

## 2022-01-01 RX ADMIN — TACROLIMUS 1 MILLIGRAM(S): 5 CAPSULE ORAL at 15:15

## 2022-01-01 RX ADMIN — Medication 500000 UNIT(S): at 12:14

## 2022-01-01 RX ADMIN — LEVETIRACETAM 250 MILLIGRAM(S): 250 TABLET, FILM COATED ORAL at 17:29

## 2022-01-01 RX ADMIN — HEPARIN SODIUM 5000 UNIT(S): 5000 INJECTION INTRAVENOUS; SUBCUTANEOUS at 17:24

## 2022-01-01 RX ADMIN — Medication 60 MILLIGRAM(S): at 17:30

## 2022-01-01 RX ADMIN — CHLORHEXIDINE GLUCONATE 1 APPLICATION(S): 213 SOLUTION TOPICAL at 11:24

## 2022-01-01 RX ADMIN — Medication 1 APPLICATION(S): at 18:02

## 2022-01-01 RX ADMIN — Medication 75 MILLIGRAM(S): at 05:28

## 2022-01-01 RX ADMIN — PANTOPRAZOLE SODIUM 40 MILLIGRAM(S): 20 TABLET, DELAYED RELEASE ORAL at 14:02

## 2022-01-01 RX ADMIN — PERAMPANEL 4 MILLIGRAM(S): 2 TABLET ORAL at 22:58

## 2022-01-01 RX ADMIN — FOSPHENYTOIN 108 MILLIGRAM(S) PE: 50 INJECTION INTRAMUSCULAR; INTRAVENOUS at 22:21

## 2022-01-01 RX ADMIN — Medication 1 APPLICATION(S): at 07:26

## 2022-01-01 RX ADMIN — SEVELAMER CARBONATE 800 MILLIGRAM(S): 2400 POWDER, FOR SUSPENSION ORAL at 12:27

## 2022-01-01 RX ADMIN — Medication 2 DROP(S): at 17:10

## 2022-01-01 RX ADMIN — CHLORHEXIDINE GLUCONATE 1 APPLICATION(S): 213 SOLUTION TOPICAL at 07:27

## 2022-01-01 RX ADMIN — Medication 100 MILLIGRAM(S): at 05:36

## 2022-01-01 RX ADMIN — Medication 2: at 17:48

## 2022-01-01 RX ADMIN — INSULIN GLARGINE 6 UNIT(S): 100 INJECTION, SOLUTION SUBCUTANEOUS at 22:27

## 2022-01-01 RX ADMIN — Medication 8 UNIT(S): at 12:14

## 2022-01-01 RX ADMIN — FLUCONAZOLE 200 MILLIGRAM(S): 150 TABLET ORAL at 12:16

## 2022-01-01 RX ADMIN — Medication 80 MILLIGRAM(S): at 05:00

## 2022-01-01 RX ADMIN — AMLODIPINE BESYLATE 10 MILLIGRAM(S): 2.5 TABLET ORAL at 13:08

## 2022-01-01 RX ADMIN — Medication 25 MILLIGRAM(S): at 05:46

## 2022-01-01 RX ADMIN — BRIVARACETAM 50 MILLIGRAM(S): 25 TABLET, FILM COATED ORAL at 22:26

## 2022-01-01 RX ADMIN — VALGANCICLOVIR 450 MILLIGRAM(S): 450 TABLET, FILM COATED ORAL at 11:46

## 2022-01-01 RX ADMIN — Medication 25 MILLIGRAM(S): at 05:10

## 2022-01-01 RX ADMIN — Medication 1: at 05:06

## 2022-01-01 RX ADMIN — Medication 1 TABLET(S): at 12:27

## 2022-01-01 RX ADMIN — CHLORHEXIDINE GLUCONATE 1 APPLICATION(S): 213 SOLUTION TOPICAL at 08:30

## 2022-01-01 RX ADMIN — Medication 6 MILLIGRAM(S): at 21:44

## 2022-01-01 RX ADMIN — Medication 4 MILLIGRAM(S): at 17:36

## 2022-01-01 RX ADMIN — LEVETIRACETAM 250 MILLIGRAM(S): 250 TABLET, FILM COATED ORAL at 06:43

## 2022-01-01 RX ADMIN — DIVALPROEX SODIUM 500 MILLIGRAM(S): 500 TABLET, DELAYED RELEASE ORAL at 07:57

## 2022-01-01 RX ADMIN — PANTOPRAZOLE SODIUM 40 MILLIGRAM(S): 20 TABLET, DELAYED RELEASE ORAL at 12:32

## 2022-01-01 RX ADMIN — Medication 50 MICROGRAM(S): at 05:56

## 2022-01-01 RX ADMIN — VALGANCICLOVIR 450 MILLIGRAM(S): 450 TABLET, FILM COATED ORAL at 12:21

## 2022-01-01 RX ADMIN — Medication 75 MILLIGRAM(S): at 14:16

## 2022-01-01 RX ADMIN — MYCOPHENOLATE MOFETIL 500 MILLIGRAM(S): 250 CAPSULE ORAL at 20:29

## 2022-01-01 RX ADMIN — Medication 1 PACKET(S): at 12:16

## 2022-01-01 RX ADMIN — ATOVAQUONE 1500 MILLIGRAM(S): 750 SUSPENSION ORAL at 08:40

## 2022-01-01 RX ADMIN — Medication 8 UNIT(S): at 08:23

## 2022-01-01 RX ADMIN — Medication 200 MILLIGRAM(S): at 05:48

## 2022-01-01 RX ADMIN — INSULIN GLARGINE 7 UNIT(S): 100 INJECTION, SOLUTION SUBCUTANEOUS at 21:48

## 2022-01-01 RX ADMIN — MAGNESIUM OXIDE 400 MG ORAL TABLET 400 MILLIGRAM(S): 241.3 TABLET ORAL at 17:47

## 2022-01-01 RX ADMIN — POLYETHYLENE GLYCOL 3350 17 GRAM(S): 17 POWDER, FOR SOLUTION ORAL at 13:13

## 2022-01-01 RX ADMIN — Medication 200 MILLIGRAM(S): at 17:54

## 2022-01-01 RX ADMIN — WARFARIN SODIUM 6 MILLIGRAM(S): 2.5 TABLET ORAL at 22:18

## 2022-01-01 RX ADMIN — MAGNESIUM OXIDE 400 MG ORAL TABLET 400 MILLIGRAM(S): 241.3 TABLET ORAL at 05:02

## 2022-01-01 RX ADMIN — LEVETIRACETAM 250 MILLIGRAM(S): 250 TABLET, FILM COATED ORAL at 17:08

## 2022-01-01 RX ADMIN — SENNA PLUS 10 MILLILITER(S): 8.6 TABLET ORAL at 22:30

## 2022-01-01 RX ADMIN — Medication 500000 UNIT(S): at 05:42

## 2022-01-01 RX ADMIN — Medication 50 MILLIGRAM(S): at 14:22

## 2022-01-01 RX ADMIN — AMLODIPINE BESYLATE 10 MILLIGRAM(S): 2.5 TABLET ORAL at 05:21

## 2022-01-01 RX ADMIN — HEPARIN SODIUM 5000 UNIT(S): 5000 INJECTION INTRAVENOUS; SUBCUTANEOUS at 17:16

## 2022-01-01 RX ADMIN — Medication 100 MILLIGRAM(S): at 16:54

## 2022-01-01 RX ADMIN — LEVETIRACETAM 400 MILLIGRAM(S): 250 TABLET, FILM COATED ORAL at 05:49

## 2022-01-01 RX ADMIN — SENNA PLUS 2 TABLET(S): 8.6 TABLET ORAL at 21:54

## 2022-01-01 RX ADMIN — DIVALPROEX SODIUM 500 MILLIGRAM(S): 500 TABLET, DELAYED RELEASE ORAL at 20:17

## 2022-01-01 RX ADMIN — HYDROMORPHONE HYDROCHLORIDE 0.5 MILLIGRAM(S): 2 INJECTION INTRAMUSCULAR; INTRAVENOUS; SUBCUTANEOUS at 20:49

## 2022-01-01 RX ADMIN — SENNA PLUS 2 TABLET(S): 8.6 TABLET ORAL at 21:15

## 2022-01-01 RX ADMIN — Medication 5 MILLIGRAM(S): at 05:21

## 2022-01-01 RX ADMIN — AMLODIPINE BESYLATE 10 MILLIGRAM(S): 2.5 TABLET ORAL at 05:54

## 2022-01-01 RX ADMIN — ERTAPENEM SODIUM 100 MILLIGRAM(S): 1 INJECTION, POWDER, LYOPHILIZED, FOR SOLUTION INTRAMUSCULAR; INTRAVENOUS at 14:42

## 2022-01-01 RX ADMIN — Medication 4 UNIT(S): at 17:57

## 2022-01-01 RX ADMIN — Medication 8 UNIT(S): at 13:32

## 2022-01-01 RX ADMIN — TRAMADOL HYDROCHLORIDE 25 MILLIGRAM(S): 50 TABLET ORAL at 07:05

## 2022-01-01 RX ADMIN — PANTOPRAZOLE SODIUM 40 MILLIGRAM(S): 20 TABLET, DELAYED RELEASE ORAL at 11:50

## 2022-01-01 RX ADMIN — INSULIN GLARGINE 7 UNIT(S): 100 INJECTION, SOLUTION SUBCUTANEOUS at 21:54

## 2022-01-01 RX ADMIN — Medication 50 MICROGRAM(S): at 05:51

## 2022-01-01 RX ADMIN — POLYETHYLENE GLYCOL 3350 17 GRAM(S): 17 POWDER, FOR SOLUTION ORAL at 11:42

## 2022-01-01 RX ADMIN — CARVEDILOL PHOSPHATE 12.5 MILLIGRAM(S): 80 CAPSULE, EXTENDED RELEASE ORAL at 06:23

## 2022-01-01 RX ADMIN — HEPARIN SODIUM 5000 UNIT(S): 5000 INJECTION INTRAVENOUS; SUBCUTANEOUS at 13:48

## 2022-01-01 RX ADMIN — Medication 3 UNIT(S): at 17:35

## 2022-01-01 RX ADMIN — Medication 50 MILLIGRAM(S): at 21:16

## 2022-01-01 RX ADMIN — Medication 500000 UNIT(S): at 11:35

## 2022-01-01 RX ADMIN — Medication 25 MILLIGRAM(S): at 21:54

## 2022-01-01 RX ADMIN — PERAMPANEL 4 MILLIGRAM(S): 2 TABLET ORAL at 21:29

## 2022-01-01 RX ADMIN — ERYTHROPOIETIN 4000 UNIT(S): 10000 INJECTION, SOLUTION INTRAVENOUS; SUBCUTANEOUS at 20:35

## 2022-01-01 RX ADMIN — Medication 500 MILLIGRAM(S): at 06:17

## 2022-01-01 RX ADMIN — MYCOPHENOLATE MOFETIL 500 MILLIGRAM(S): 250 CAPSULE ORAL at 09:10

## 2022-01-01 RX ADMIN — VALGANCICLOVIR 450 MILLIGRAM(S): 450 TABLET, FILM COATED ORAL at 09:06

## 2022-01-01 RX ADMIN — FLUCONAZOLE 100 MILLIGRAM(S): 150 TABLET ORAL at 08:06

## 2022-01-01 RX ADMIN — Medication 500000 UNIT(S): at 18:56

## 2022-01-01 RX ADMIN — Medication 1000 MILLIGRAM(S): at 02:48

## 2022-01-01 RX ADMIN — TACROLIMUS 0.5 MILLIGRAM(S): 5 CAPSULE ORAL at 20:37

## 2022-01-01 RX ADMIN — Medication 10 MILLIGRAM(S): at 05:54

## 2022-01-01 RX ADMIN — Medication 9 UNIT(S): at 18:28

## 2022-01-01 RX ADMIN — Medication 100 GRAM(S): at 13:03

## 2022-01-01 RX ADMIN — VALGANCICLOVIR 450 MILLIGRAM(S): 450 TABLET, FILM COATED ORAL at 11:35

## 2022-01-01 RX ADMIN — MYCOPHENOLATE MOFETIL 1000 MILLIGRAM(S): 250 CAPSULE ORAL at 15:30

## 2022-01-01 RX ADMIN — Medication 25 MILLIGRAM(S): at 13:22

## 2022-01-01 RX ADMIN — Medication 60 MILLIGRAM(S): at 05:43

## 2022-01-01 RX ADMIN — PANTOPRAZOLE SODIUM 40 MILLIGRAM(S): 20 TABLET, DELAYED RELEASE ORAL at 12:27

## 2022-01-01 RX ADMIN — Medication 20 MILLIGRAM(S): at 05:10

## 2022-01-01 RX ADMIN — Medication 200 MILLIGRAM(S): at 06:29

## 2022-01-01 RX ADMIN — LEVETIRACETAM 250 MILLIGRAM(S): 250 TABLET, FILM COATED ORAL at 17:07

## 2022-01-01 RX ADMIN — Medication 10 MILLIGRAM(S): at 00:49

## 2022-01-01 RX ADMIN — Medication 37.5 MICROGRAM(S): at 21:39

## 2022-01-01 RX ADMIN — Medication 50 MILLIGRAM(S): at 22:35

## 2022-01-01 RX ADMIN — Medication 5 MILLIGRAM(S): at 22:30

## 2022-01-01 RX ADMIN — Medication 650 MILLIGRAM(S): at 09:34

## 2022-01-01 RX ADMIN — AMLODIPINE BESYLATE 10 MILLIGRAM(S): 2.5 TABLET ORAL at 05:23

## 2022-01-01 RX ADMIN — MYCOPHENOLATE MOFETIL 1000 MILLIGRAM(S): 250 CAPSULE ORAL at 20:06

## 2022-01-01 RX ADMIN — LEVETIRACETAM 250 MILLIGRAM(S): 250 TABLET, FILM COATED ORAL at 17:59

## 2022-01-01 RX ADMIN — Medication 8 UNIT(S): at 13:05

## 2022-01-01 RX ADMIN — LACTULOSE 200 GRAM(S): 10 SOLUTION ORAL at 04:40

## 2022-01-01 RX ADMIN — MYCOPHENOLATE MOFETIL 500 MILLIGRAM(S): 250 CAPSULE ORAL at 06:01

## 2022-01-01 RX ADMIN — MAGNESIUM OXIDE 400 MG ORAL TABLET 400 MILLIGRAM(S): 241.3 TABLET ORAL at 05:34

## 2022-01-01 RX ADMIN — Medication 500000 UNIT(S): at 05:24

## 2022-01-01 RX ADMIN — MYCOPHENOLATE MOFETIL 250 MILLIGRAM(S): 250 CAPSULE ORAL at 20:27

## 2022-01-01 RX ADMIN — CHLORHEXIDINE GLUCONATE 1 APPLICATION(S): 213 SOLUTION TOPICAL at 10:33

## 2022-01-01 RX ADMIN — PANTOPRAZOLE SODIUM 40 MILLIGRAM(S): 20 TABLET, DELAYED RELEASE ORAL at 11:30

## 2022-01-01 RX ADMIN — FOSPHENYTOIN 104 MILLIGRAM(S) PE: 50 INJECTION INTRAMUSCULAR; INTRAVENOUS at 05:06

## 2022-01-01 RX ADMIN — Medication 650 MILLIGRAM(S): at 16:52

## 2022-01-01 RX ADMIN — AMLODIPINE BESYLATE 10 MILLIGRAM(S): 2.5 TABLET ORAL at 05:01

## 2022-01-01 RX ADMIN — CARVEDILOL PHOSPHATE 12.5 MILLIGRAM(S): 80 CAPSULE, EXTENDED RELEASE ORAL at 17:29

## 2022-01-01 RX ADMIN — Medication 200 MILLIGRAM(S): at 17:48

## 2022-01-01 RX ADMIN — FLUCONAZOLE 200 MILLIGRAM(S): 150 TABLET ORAL at 13:38

## 2022-01-01 RX ADMIN — Medication 4: at 11:09

## 2022-01-01 RX ADMIN — Medication 50 MICROGRAM(S): at 06:56

## 2022-01-01 RX ADMIN — Medication 4: at 22:10

## 2022-01-01 RX ADMIN — Medication 30 MILLIGRAM(S): at 05:13

## 2022-01-01 RX ADMIN — Medication 250 MILLIGRAM(S): at 17:05

## 2022-01-01 RX ADMIN — FINASTERIDE 5 MILLIGRAM(S): 5 TABLET, FILM COATED ORAL at 11:37

## 2022-01-01 RX ADMIN — Medication 100 MILLIGRAM(S): at 06:30

## 2022-01-01 RX ADMIN — PANTOPRAZOLE SODIUM 40 MILLIGRAM(S): 20 TABLET, DELAYED RELEASE ORAL at 09:08

## 2022-01-01 RX ADMIN — BRIVARACETAM 50 MILLIGRAM(S): 25 TABLET, FILM COATED ORAL at 08:19

## 2022-01-01 RX ADMIN — INSULIN HUMAN 3 UNIT(S): 100 INJECTION, SOLUTION SUBCUTANEOUS at 18:21

## 2022-01-01 RX ADMIN — WARFARIN SODIUM 5 MILLIGRAM(S): 2.5 TABLET ORAL at 21:33

## 2022-01-01 RX ADMIN — Medication 3 UNIT(S): at 17:33

## 2022-01-01 RX ADMIN — PANTOPRAZOLE SODIUM 40 MILLIGRAM(S): 20 TABLET, DELAYED RELEASE ORAL at 10:45

## 2022-01-01 RX ADMIN — Medication 2: at 17:53

## 2022-01-01 RX ADMIN — POLYETHYLENE GLYCOL 3350 17 GRAM(S): 17 POWDER, FOR SOLUTION ORAL at 11:29

## 2022-01-01 RX ADMIN — Medication 100 MILLIGRAM(S): at 21:11

## 2022-01-01 RX ADMIN — VALGANCICLOVIR 450 MILLIGRAM(S): 450 TABLET, FILM COATED ORAL at 07:55

## 2022-01-01 RX ADMIN — CARVEDILOL PHOSPHATE 6.25 MILLIGRAM(S): 80 CAPSULE, EXTENDED RELEASE ORAL at 18:06

## 2022-01-01 RX ADMIN — Medication 1 TABLET(S): at 11:59

## 2022-01-01 RX ADMIN — Medication 60 MILLIGRAM(S): at 06:01

## 2022-01-01 RX ADMIN — FLUCONAZOLE 400 MILLIGRAM(S): 150 TABLET ORAL at 12:22

## 2022-01-01 RX ADMIN — Medication 2 DROP(S): at 12:26

## 2022-01-01 RX ADMIN — Medication 7 UNIT(S): at 13:42

## 2022-01-01 RX ADMIN — Medication 200 MILLIGRAM(S): at 17:39

## 2022-01-01 RX ADMIN — CARVEDILOL PHOSPHATE 6.25 MILLIGRAM(S): 80 CAPSULE, EXTENDED RELEASE ORAL at 05:08

## 2022-01-01 RX ADMIN — Medication 25 MILLIGRAM(S): at 16:22

## 2022-01-01 RX ADMIN — Medication 500000 UNIT(S): at 13:15

## 2022-01-01 RX ADMIN — MYCOPHENOLATE MOFETIL 250 MILLIGRAM(S): 250 CAPSULE ORAL at 20:13

## 2022-01-01 RX ADMIN — VALGANCICLOVIR 450 MILLIGRAM(S): 450 TABLET, FILM COATED ORAL at 13:19

## 2022-01-01 RX ADMIN — CHLORHEXIDINE GLUCONATE 1 APPLICATION(S): 213 SOLUTION TOPICAL at 06:20

## 2022-01-01 RX ADMIN — CHLORHEXIDINE GLUCONATE 1 APPLICATION(S): 213 SOLUTION TOPICAL at 12:52

## 2022-01-01 RX ADMIN — Medication 4 UNIT(S): at 17:27

## 2022-01-01 RX ADMIN — WARFARIN SODIUM 6 MILLIGRAM(S): 2.5 TABLET ORAL at 21:21

## 2022-01-01 RX ADMIN — BRIVARACETAM 50 MILLIGRAM(S): 25 TABLET, FILM COATED ORAL at 05:04

## 2022-01-01 RX ADMIN — MAGNESIUM OXIDE 400 MG ORAL TABLET 400 MILLIGRAM(S): 241.3 TABLET ORAL at 13:15

## 2022-01-01 RX ADMIN — INSULIN GLARGINE 4 UNIT(S): 100 INJECTION, SOLUTION SUBCUTANEOUS at 21:57

## 2022-01-01 RX ADMIN — Medication 4 MILLIGRAM(S): at 16:20

## 2022-01-01 RX ADMIN — Medication 1 TABLET(S): at 12:38

## 2022-01-01 RX ADMIN — SEVELAMER CARBONATE 800 MILLIGRAM(S): 2400 POWDER, FOR SUSPENSION ORAL at 11:36

## 2022-01-01 RX ADMIN — TACROLIMUS 5 MILLIGRAM(S): 5 CAPSULE ORAL at 07:24

## 2022-01-01 RX ADMIN — MYCOPHENOLATE MOFETIL 1 GRAM(S): 250 CAPSULE ORAL at 20:03

## 2022-01-01 RX ADMIN — Medication 1 MILLIGRAM(S): at 15:42

## 2022-01-01 RX ADMIN — HYDROMORPHONE HYDROCHLORIDE 0.5 MILLIGRAM(S): 2 INJECTION INTRAMUSCULAR; INTRAVENOUS; SUBCUTANEOUS at 03:42

## 2022-01-01 RX ADMIN — PANTOPRAZOLE SODIUM 40 MILLIGRAM(S): 20 TABLET, DELAYED RELEASE ORAL at 09:57

## 2022-01-01 RX ADMIN — DIVALPROEX SODIUM 250 MILLIGRAM(S): 500 TABLET, DELAYED RELEASE ORAL at 11:53

## 2022-01-01 RX ADMIN — Medication 1 APPLICATION(S): at 05:27

## 2022-01-01 RX ADMIN — Medication 6 MILLIGRAM(S): at 21:54

## 2022-01-01 RX ADMIN — MAGNESIUM OXIDE 400 MG ORAL TABLET 400 MILLIGRAM(S): 241.3 TABLET ORAL at 22:50

## 2022-01-01 RX ADMIN — AMLODIPINE BESYLATE 10 MILLIGRAM(S): 2.5 TABLET ORAL at 08:13

## 2022-01-01 RX ADMIN — MYCOPHENOLATE MOFETIL 1000 MILLIGRAM(S): 250 CAPSULE ORAL at 20:35

## 2022-01-01 RX ADMIN — MEROPENEM 100 MILLIGRAM(S): 1 INJECTION INTRAVENOUS at 05:03

## 2022-01-01 RX ADMIN — ATORVASTATIN CALCIUM 40 MILLIGRAM(S): 80 TABLET, FILM COATED ORAL at 21:54

## 2022-01-01 RX ADMIN — HYDROMORPHONE HYDROCHLORIDE 0.5 MILLIGRAM(S): 2 INJECTION INTRAMUSCULAR; INTRAVENOUS; SUBCUTANEOUS at 16:37

## 2022-01-01 RX ADMIN — MYCOPHENOLATE MOFETIL 500 MILLIGRAM(S): 250 CAPSULE ORAL at 08:31

## 2022-01-01 RX ADMIN — Medication 10 MILLIGRAM(S): at 03:00

## 2022-01-01 RX ADMIN — Medication 50 MICROGRAM(S): at 05:34

## 2022-01-01 RX ADMIN — Medication 25 MILLIGRAM(S): at 21:01

## 2022-01-01 RX ADMIN — Medication 80 MILLIGRAM(S): at 05:24

## 2022-01-01 RX ADMIN — Medication 650 MILLIGRAM(S): at 23:38

## 2022-01-01 RX ADMIN — Medication 25 MILLIGRAM(S): at 06:01

## 2022-01-01 RX ADMIN — Medication 8 UNIT(S): at 17:36

## 2022-01-01 RX ADMIN — TACROLIMUS 1 MILLIGRAM(S): 5 CAPSULE ORAL at 07:17

## 2022-01-01 RX ADMIN — Medication 50 MILLIGRAM(S): at 14:26

## 2022-01-01 RX ADMIN — ATORVASTATIN CALCIUM 40 MILLIGRAM(S): 80 TABLET, FILM COATED ORAL at 20:50

## 2022-01-01 RX ADMIN — HEPARIN SODIUM 12 UNIT(S)/HR: 5000 INJECTION INTRAVENOUS; SUBCUTANEOUS at 06:55

## 2022-01-01 RX ADMIN — Medication 25 MILLIGRAM(S): at 21:13

## 2022-01-01 RX ADMIN — Medication 500000 UNIT(S): at 05:27

## 2022-01-01 RX ADMIN — Medication 1 TABLET(S): at 12:11

## 2022-01-01 RX ADMIN — PANTOPRAZOLE SODIUM 40 MILLIGRAM(S): 20 TABLET, DELAYED RELEASE ORAL at 08:35

## 2022-01-01 RX ADMIN — ENOXAPARIN SODIUM 60 MILLIGRAM(S): 100 INJECTION SUBCUTANEOUS at 05:21

## 2022-01-01 RX ADMIN — FINASTERIDE 5 MILLIGRAM(S): 5 TABLET, FILM COATED ORAL at 12:28

## 2022-01-01 RX ADMIN — Medication 650 MILLIGRAM(S): at 08:33

## 2022-01-01 RX ADMIN — CHLORHEXIDINE GLUCONATE 1 APPLICATION(S): 213 SOLUTION TOPICAL at 12:03

## 2022-01-01 RX ADMIN — Medication 25 MILLIGRAM(S): at 13:55

## 2022-01-01 RX ADMIN — Medication 2 DROP(S): at 06:05

## 2022-01-01 RX ADMIN — AMLODIPINE BESYLATE 10 MILLIGRAM(S): 2.5 TABLET ORAL at 06:10

## 2022-01-01 RX ADMIN — Medication 975 MILLIGRAM(S): at 09:45

## 2022-01-01 RX ADMIN — SENNA PLUS 2 TABLET(S): 8.6 TABLET ORAL at 22:20

## 2022-01-01 RX ADMIN — Medication 1300 MILLIGRAM(S): at 17:52

## 2022-01-01 RX ADMIN — Medication 3 UNIT(S): at 11:49

## 2022-01-01 RX ADMIN — VALGANCICLOVIR 450 MILLIGRAM(S): 450 TABLET, FILM COATED ORAL at 13:34

## 2022-01-01 RX ADMIN — CHLORHEXIDINE GLUCONATE 1 APPLICATION(S): 213 SOLUTION TOPICAL at 09:21

## 2022-01-01 RX ADMIN — Medication 500000 UNIT(S): at 23:12

## 2022-01-01 RX ADMIN — Medication 500000 UNIT(S): at 22:08

## 2022-01-01 RX ADMIN — FINASTERIDE 5 MILLIGRAM(S): 5 TABLET, FILM COATED ORAL at 13:08

## 2022-01-01 RX ADMIN — APIXABAN 5 MILLIGRAM(S): 2.5 TABLET, FILM COATED ORAL at 18:31

## 2022-01-01 RX ADMIN — Medication 1 TABLET(S): at 11:05

## 2022-01-01 RX ADMIN — Medication 4 UNIT(S): at 08:01

## 2022-01-01 RX ADMIN — Medication 2 DROP(S): at 00:13

## 2022-01-01 RX ADMIN — POLYETHYLENE GLYCOL 3350 17 GRAM(S): 17 POWDER, FOR SOLUTION ORAL at 09:41

## 2022-01-01 RX ADMIN — Medication 25 GRAM(S): at 16:38

## 2022-01-01 RX ADMIN — CHLORHEXIDINE GLUCONATE 15 MILLILITER(S): 213 SOLUTION TOPICAL at 19:42

## 2022-01-01 RX ADMIN — INSULIN HUMAN 3 UNIT(S)/HR: 100 INJECTION, SOLUTION SUBCUTANEOUS at 18:17

## 2022-01-01 RX ADMIN — Medication 650 MILLIGRAM(S): at 01:36

## 2022-01-01 RX ADMIN — FOSPHENYTOIN 108 MILLIGRAM(S) PE: 50 INJECTION INTRAMUSCULAR; INTRAVENOUS at 10:16

## 2022-01-01 RX ADMIN — Medication 2: at 09:21

## 2022-01-01 RX ADMIN — Medication 80 MILLIGRAM(S): at 06:18

## 2022-01-01 RX ADMIN — HEPARIN SODIUM 5000 UNIT(S): 5000 INJECTION INTRAVENOUS; SUBCUTANEOUS at 06:16

## 2022-01-01 RX ADMIN — LEVETIRACETAM 250 MILLIGRAM(S): 250 TABLET, FILM COATED ORAL at 18:07

## 2022-01-01 RX ADMIN — Medication 200 MILLIGRAM(S): at 05:00

## 2022-01-01 RX ADMIN — LEVETIRACETAM 250 MILLIGRAM(S): 250 TABLET, FILM COATED ORAL at 06:08

## 2022-01-01 RX ADMIN — Medication 650 MILLIGRAM(S): at 12:35

## 2022-01-01 RX ADMIN — Medication 25 MILLIGRAM(S): at 05:35

## 2022-01-01 RX ADMIN — Medication 50 MICROGRAM(S): at 05:06

## 2022-01-01 RX ADMIN — BRIVARACETAM 50 MILLIGRAM(S): 25 TABLET, FILM COATED ORAL at 18:02

## 2022-01-01 RX ADMIN — Medication 1 TABLET(S): at 12:35

## 2022-01-01 RX ADMIN — CARVEDILOL PHOSPHATE 6.25 MILLIGRAM(S): 80 CAPSULE, EXTENDED RELEASE ORAL at 17:30

## 2022-01-01 RX ADMIN — AMLODIPINE BESYLATE 5 MILLIGRAM(S): 2.5 TABLET ORAL at 05:14

## 2022-01-01 RX ADMIN — Medication 650 MILLIGRAM(S): at 21:05

## 2022-01-01 RX ADMIN — CARVEDILOL PHOSPHATE 6.25 MILLIGRAM(S): 80 CAPSULE, EXTENDED RELEASE ORAL at 17:31

## 2022-01-01 RX ADMIN — Medication 25 MILLIGRAM(S): at 22:55

## 2022-01-01 RX ADMIN — Medication 500000 UNIT(S): at 00:13

## 2022-01-01 RX ADMIN — Medication 8 MILLIGRAM(S): at 21:08

## 2022-01-01 RX ADMIN — Medication 650 MILLIGRAM(S): at 19:45

## 2022-01-01 RX ADMIN — Medication 650 MILLIGRAM(S): at 16:22

## 2022-01-01 RX ADMIN — BRIVARACETAM 50 MILLIGRAM(S): 25 TABLET, FILM COATED ORAL at 06:22

## 2022-01-01 RX ADMIN — SIROLIMUS 6 MILLIGRAM(S): 1 SOLUTION ORAL at 09:20

## 2022-01-01 RX ADMIN — Medication 250 MILLIGRAM(S): at 07:46

## 2022-01-01 RX ADMIN — SEVELAMER CARBONATE 800 MILLIGRAM(S): 2400 POWDER, FOR SUSPENSION ORAL at 11:58

## 2022-01-01 RX ADMIN — MYCOPHENOLATE MOFETIL 500 MILLIGRAM(S): 250 CAPSULE ORAL at 05:21

## 2022-01-01 RX ADMIN — Medication 100 MILLIGRAM(S): at 21:41

## 2022-01-01 RX ADMIN — POLYETHYLENE GLYCOL 3350 17 GRAM(S): 17 POWDER, FOR SOLUTION ORAL at 15:44

## 2022-01-01 RX ADMIN — CHLORHEXIDINE GLUCONATE 1 APPLICATION(S): 213 SOLUTION TOPICAL at 05:32

## 2022-01-01 RX ADMIN — PANTOPRAZOLE SODIUM 40 MILLIGRAM(S): 20 TABLET, DELAYED RELEASE ORAL at 10:32

## 2022-01-01 RX ADMIN — Medication 6 MILLIGRAM(S): at 21:38

## 2022-01-01 RX ADMIN — Medication 2 DROP(S): at 05:14

## 2022-01-01 RX ADMIN — APIXABAN 5 MILLIGRAM(S): 2.5 TABLET, FILM COATED ORAL at 05:34

## 2022-01-01 RX ADMIN — Medication 650 MILLIGRAM(S): at 06:15

## 2022-01-01 RX ADMIN — HEPARIN SODIUM 10 UNIT(S)/HR: 5000 INJECTION INTRAVENOUS; SUBCUTANEOUS at 15:45

## 2022-01-01 RX ADMIN — Medication 25 MILLIGRAM(S): at 16:50

## 2022-01-01 RX ADMIN — Medication 500000 UNIT(S): at 17:20

## 2022-01-01 RX ADMIN — Medication 4: at 07:40

## 2022-01-01 RX ADMIN — SIROLIMUS 7 MILLIGRAM(S): 1 SOLUTION ORAL at 08:39

## 2022-01-01 RX ADMIN — Medication 80 MILLIGRAM(S): at 06:43

## 2022-01-01 RX ADMIN — APIXABAN 2.5 MILLIGRAM(S): 2.5 TABLET, FILM COATED ORAL at 05:55

## 2022-01-01 RX ADMIN — PANTOPRAZOLE SODIUM 40 MILLIGRAM(S): 20 TABLET, DELAYED RELEASE ORAL at 09:43

## 2022-01-01 RX ADMIN — Medication 80 MILLIGRAM(S): at 12:09

## 2022-01-01 RX ADMIN — MYCOPHENOLATE MOFETIL 500 MILLIGRAM(S): 250 CAPSULE ORAL at 19:06

## 2022-01-01 RX ADMIN — SIROLIMUS 5 MILLIGRAM(S): 1 SOLUTION ORAL at 09:01

## 2022-01-01 RX ADMIN — Medication 3 UNIT(S): at 12:24

## 2022-01-01 RX ADMIN — Medication 1: at 09:33

## 2022-01-01 RX ADMIN — FOSPHENYTOIN 104 MILLIGRAM(S) PE: 50 INJECTION INTRAMUSCULAR; INTRAVENOUS at 23:23

## 2022-01-01 RX ADMIN — Medication 3 UNIT(S): at 12:01

## 2022-01-01 RX ADMIN — Medication 500000 UNIT(S): at 18:01

## 2022-01-01 RX ADMIN — Medication 1 TABLET(S): at 12:37

## 2022-01-01 RX ADMIN — Medication 500000 UNIT(S): at 23:20

## 2022-01-01 RX ADMIN — Medication 100 MILLIGRAM(S): at 22:17

## 2022-01-01 RX ADMIN — SENNA PLUS 10 MILLILITER(S): 8.6 TABLET ORAL at 21:38

## 2022-01-01 RX ADMIN — Medication 1 TABLET(S): at 12:50

## 2022-01-01 RX ADMIN — Medication 5 MILLIGRAM(S): at 01:28

## 2022-01-01 RX ADMIN — Medication 4 MILLIGRAM(S): at 17:38

## 2022-01-01 RX ADMIN — LACTULOSE 10 GRAM(S): 10 SOLUTION ORAL at 14:35

## 2022-01-01 RX ADMIN — Medication 100 MILLIGRAM(S): at 20:20

## 2022-01-01 RX ADMIN — SENNA PLUS 2 TABLET(S): 8.6 TABLET ORAL at 22:10

## 2022-01-01 RX ADMIN — Medication 1 TABLET(S): at 12:06

## 2022-01-01 RX ADMIN — ONDANSETRON 4 MILLIGRAM(S): 8 TABLET, FILM COATED ORAL at 20:46

## 2022-01-01 RX ADMIN — LEVETIRACETAM 250 MILLIGRAM(S): 250 TABLET, FILM COATED ORAL at 05:09

## 2022-01-01 RX ADMIN — Medication 1 APPLICATION(S): at 08:31

## 2022-01-01 RX ADMIN — Medication 1: at 12:31

## 2022-01-01 RX ADMIN — Medication 1 APPLICATION(S): at 05:11

## 2022-01-01 RX ADMIN — CHLORHEXIDINE GLUCONATE 1 APPLICATION(S): 213 SOLUTION TOPICAL at 06:09

## 2022-01-01 RX ADMIN — Medication 5 UNIT(S): at 12:05

## 2022-01-01 RX ADMIN — Medication 2 DROP(S): at 11:56

## 2022-01-01 RX ADMIN — Medication 75 MILLIGRAM(S): at 15:58

## 2022-01-01 RX ADMIN — MYCOPHENOLATE MOFETIL 1000 MILLIGRAM(S): 250 CAPSULE ORAL at 17:46

## 2022-01-01 RX ADMIN — Medication 3 UNIT(S): at 17:57

## 2022-01-01 RX ADMIN — Medication 4 MILLIGRAM(S): at 05:32

## 2022-01-01 RX ADMIN — BRIVARACETAM 50 MILLIGRAM(S): 25 TABLET, FILM COATED ORAL at 17:43

## 2022-01-01 RX ADMIN — Medication 6 UNIT(S): at 17:47

## 2022-01-01 RX ADMIN — BRIVARACETAM 50 MILLIGRAM(S): 25 TABLET, FILM COATED ORAL at 14:13

## 2022-01-01 RX ADMIN — Medication 500000 UNIT(S): at 06:00

## 2022-01-01 RX ADMIN — BRIVARACETAM 50 MILLIGRAM(S): 25 TABLET, FILM COATED ORAL at 05:24

## 2022-01-01 RX ADMIN — CHLORHEXIDINE GLUCONATE 1 APPLICATION(S): 213 SOLUTION TOPICAL at 05:07

## 2022-01-01 RX ADMIN — Medication 4 UNIT(S): at 07:57

## 2022-01-01 RX ADMIN — Medication 3: at 11:51

## 2022-01-01 RX ADMIN — Medication 4 MILLIGRAM(S): at 17:50

## 2022-01-01 RX ADMIN — Medication 1 TABLET(S): at 11:57

## 2022-01-01 RX ADMIN — CHLORHEXIDINE GLUCONATE 15 MILLILITER(S): 213 SOLUTION TOPICAL at 05:42

## 2022-01-01 RX ADMIN — Medication 8 MILLIGRAM(S): at 22:18

## 2022-01-01 RX ADMIN — BRIVARACETAM 50 MILLIGRAM(S): 25 TABLET, FILM COATED ORAL at 23:20

## 2022-01-01 RX ADMIN — CHLORHEXIDINE GLUCONATE 1 APPLICATION(S): 213 SOLUTION TOPICAL at 12:11

## 2022-01-01 RX ADMIN — MYCOPHENOLATE MOFETIL 250 MILLIGRAM(S): 250 CAPSULE ORAL at 08:01

## 2022-01-01 RX ADMIN — HYDROMORPHONE HYDROCHLORIDE 0.5 MILLIGRAM(S): 2 INJECTION INTRAMUSCULAR; INTRAVENOUS; SUBCUTANEOUS at 20:19

## 2022-01-01 RX ADMIN — Medication 10 MILLIGRAM(S): at 06:05

## 2022-01-01 RX ADMIN — Medication 650 MILLIGRAM(S): at 17:30

## 2022-01-01 RX ADMIN — SEVELAMER CARBONATE 800 MILLIGRAM(S): 2400 POWDER, FOR SUSPENSION ORAL at 21:04

## 2022-01-01 RX ADMIN — CARVEDILOL PHOSPHATE 12.5 MILLIGRAM(S): 80 CAPSULE, EXTENDED RELEASE ORAL at 18:42

## 2022-01-01 RX ADMIN — MYCOPHENOLATE MOFETIL 500 MILLIGRAM(S): 250 CAPSULE ORAL at 19:50

## 2022-01-01 RX ADMIN — MYCOPHENOLATE MOFETIL 500 MILLIGRAM(S): 250 CAPSULE ORAL at 19:45

## 2022-01-01 RX ADMIN — Medication 500000 UNIT(S): at 23:38

## 2022-01-01 RX ADMIN — Medication 650 MILLIGRAM(S): at 23:51

## 2022-01-01 RX ADMIN — Medication 2 DROP(S): at 11:06

## 2022-01-01 RX ADMIN — Medication 3 MILLIGRAM(S): at 22:07

## 2022-01-01 RX ADMIN — Medication 2: at 11:39

## 2022-01-01 RX ADMIN — SIROLIMUS 7 MILLIGRAM(S): 1 SOLUTION ORAL at 08:55

## 2022-01-01 RX ADMIN — VALGANCICLOVIR 450 MILLIGRAM(S): 450 TABLET, FILM COATED ORAL at 11:18

## 2022-01-01 RX ADMIN — Medication 667 MILLIGRAM(S): at 12:16

## 2022-01-01 RX ADMIN — Medication 3 UNIT(S): at 17:54

## 2022-01-01 RX ADMIN — AMLODIPINE BESYLATE 10 MILLIGRAM(S): 2.5 TABLET ORAL at 05:20

## 2022-01-01 RX ADMIN — Medication 2: at 08:30

## 2022-01-01 RX ADMIN — Medication 1300 MILLIGRAM(S): at 05:43

## 2022-01-01 RX ADMIN — Medication 4 MILLIGRAM(S): at 04:59

## 2022-01-01 RX ADMIN — Medication 100 MILLIGRAM(S): at 13:37

## 2022-01-01 RX ADMIN — Medication 500000 UNIT(S): at 05:28

## 2022-01-01 RX ADMIN — Medication 1: at 13:16

## 2022-01-01 RX ADMIN — AMLODIPINE BESYLATE 10 MILLIGRAM(S): 2.5 TABLET ORAL at 06:45

## 2022-01-01 RX ADMIN — INSULIN GLARGINE 6 UNIT(S): 100 INJECTION, SOLUTION SUBCUTANEOUS at 22:39

## 2022-01-01 RX ADMIN — CHLORHEXIDINE GLUCONATE 1 APPLICATION(S): 213 SOLUTION TOPICAL at 08:55

## 2022-01-01 RX ADMIN — CARVEDILOL PHOSPHATE 12.5 MILLIGRAM(S): 80 CAPSULE, EXTENDED RELEASE ORAL at 05:38

## 2022-01-01 RX ADMIN — Medication 100 MILLIGRAM(S): at 21:58

## 2022-01-01 RX ADMIN — OXYCODONE HYDROCHLORIDE 2.5 MILLIGRAM(S): 5 TABLET ORAL at 21:00

## 2022-01-01 RX ADMIN — Medication 10 MILLIGRAM(S): at 06:23

## 2022-01-01 RX ADMIN — SIROLIMUS 7 MILLIGRAM(S): 1 SOLUTION ORAL at 08:49

## 2022-01-01 RX ADMIN — MYCOPHENOLATE MOFETIL 500 MILLIGRAM(S): 250 CAPSULE ORAL at 21:33

## 2022-01-01 RX ADMIN — VALGANCICLOVIR 450 MILLIGRAM(S): 450 TABLET, FILM COATED ORAL at 12:25

## 2022-01-01 RX ADMIN — ERYTHROPOIETIN 14000 UNIT(S): 10000 INJECTION, SOLUTION INTRAVENOUS; SUBCUTANEOUS at 15:43

## 2022-01-01 RX ADMIN — Medication 5 MILLIGRAM(S): at 08:02

## 2022-01-01 RX ADMIN — Medication 50 MILLIGRAM(S): at 06:05

## 2022-01-01 RX ADMIN — Medication 1 TABLET(S): at 12:25

## 2022-01-01 RX ADMIN — Medication 20 MILLIGRAM(S): at 11:03

## 2022-01-01 RX ADMIN — PANTOPRAZOLE SODIUM 40 MILLIGRAM(S): 20 TABLET, DELAYED RELEASE ORAL at 05:35

## 2022-01-01 RX ADMIN — HEPARIN SODIUM 5000 UNIT(S): 5000 INJECTION INTRAVENOUS; SUBCUTANEOUS at 21:54

## 2022-01-01 RX ADMIN — CHLORHEXIDINE GLUCONATE 15 MILLILITER(S): 213 SOLUTION TOPICAL at 05:01

## 2022-01-01 RX ADMIN — Medication 650 MILLIGRAM(S): at 05:02

## 2022-01-01 RX ADMIN — Medication 100 GRAM(S): at 14:29

## 2022-01-01 RX ADMIN — CARVEDILOL PHOSPHATE 6.25 MILLIGRAM(S): 80 CAPSULE, EXTENDED RELEASE ORAL at 17:51

## 2022-01-01 RX ADMIN — Medication 200 MILLIGRAM(S): at 06:24

## 2022-01-01 RX ADMIN — HYDROMORPHONE HYDROCHLORIDE 0.5 MILLIGRAM(S): 2 INJECTION INTRAMUSCULAR; INTRAVENOUS; SUBCUTANEOUS at 09:17

## 2022-01-01 RX ADMIN — Medication 6 MILLIGRAM(S): at 21:10

## 2022-01-01 RX ADMIN — Medication 500000 UNIT(S): at 17:40

## 2022-01-01 RX ADMIN — Medication 975 MILLIGRAM(S): at 05:30

## 2022-01-01 RX ADMIN — FINASTERIDE 5 MILLIGRAM(S): 5 TABLET, FILM COATED ORAL at 12:45

## 2022-01-01 RX ADMIN — Medication 4 MILLIGRAM(S): at 05:46

## 2022-01-01 RX ADMIN — Medication 1: at 09:13

## 2022-01-01 RX ADMIN — Medication 4: at 09:20

## 2022-01-01 RX ADMIN — HEPARIN SODIUM 12 UNIT(S)/HR: 5000 INJECTION INTRAVENOUS; SUBCUTANEOUS at 22:50

## 2022-01-01 RX ADMIN — Medication 250 MILLIGRAM(S): at 03:54

## 2022-01-01 RX ADMIN — Medication 80 MILLIGRAM(S): at 06:11

## 2022-01-01 RX ADMIN — CARVEDILOL PHOSPHATE 3.12 MILLIGRAM(S): 80 CAPSULE, EXTENDED RELEASE ORAL at 18:35

## 2022-01-01 RX ADMIN — Medication 9 UNIT(S): at 13:23

## 2022-01-01 RX ADMIN — ATORVASTATIN CALCIUM 40 MILLIGRAM(S): 80 TABLET, FILM COATED ORAL at 23:25

## 2022-01-01 RX ADMIN — Medication 25 MILLIGRAM(S): at 20:27

## 2022-01-01 RX ADMIN — Medication 500000 UNIT(S): at 23:17

## 2022-01-01 RX ADMIN — Medication 1 GRAM(S): at 06:37

## 2022-01-01 RX ADMIN — MYCOPHENOLATE MOFETIL 1000 MILLIGRAM(S): 250 CAPSULE ORAL at 08:29

## 2022-01-01 RX ADMIN — Medication 1000 MILLIGRAM(S): at 23:22

## 2022-01-01 RX ADMIN — Medication 500000 UNIT(S): at 05:58

## 2022-01-01 RX ADMIN — Medication 4 UNIT(S): at 12:53

## 2022-01-01 RX ADMIN — SENNA PLUS 2 TABLET(S): 8.6 TABLET ORAL at 22:38

## 2022-01-01 RX ADMIN — SIROLIMUS 2.5 MILLIGRAM(S): 1 SOLUTION ORAL at 07:31

## 2022-01-01 RX ADMIN — CHLORHEXIDINE GLUCONATE 1 APPLICATION(S): 213 SOLUTION TOPICAL at 09:47

## 2022-01-01 RX ADMIN — PANTOPRAZOLE SODIUM 40 MILLIGRAM(S): 20 TABLET, DELAYED RELEASE ORAL at 12:26

## 2022-01-01 RX ADMIN — FINASTERIDE 5 MILLIGRAM(S): 5 TABLET, FILM COATED ORAL at 11:01

## 2022-01-01 RX ADMIN — Medication 500000 UNIT(S): at 06:54

## 2022-01-01 RX ADMIN — CARVEDILOL PHOSPHATE 12.5 MILLIGRAM(S): 80 CAPSULE, EXTENDED RELEASE ORAL at 05:04

## 2022-01-01 RX ADMIN — POLYETHYLENE GLYCOL 3350 17 GRAM(S): 17 POWDER, FOR SOLUTION ORAL at 12:23

## 2022-01-01 RX ADMIN — MAGNESIUM OXIDE 400 MG ORAL TABLET 400 MILLIGRAM(S): 241.3 TABLET ORAL at 14:29

## 2022-01-01 RX ADMIN — Medication 200 MILLIGRAM(S): at 18:17

## 2022-01-01 RX ADMIN — Medication 2 DROP(S): at 18:39

## 2022-01-01 RX ADMIN — Medication 2 UNIT(S): at 18:37

## 2022-01-01 RX ADMIN — Medication 10 MILLIGRAM(S): at 05:26

## 2022-01-01 RX ADMIN — ERYTHROPOIETIN 10000 UNIT(S): 10000 INJECTION, SOLUTION INTRAVENOUS; SUBCUTANEOUS at 15:22

## 2022-01-01 RX ADMIN — Medication 9 UNIT(S): at 19:16

## 2022-01-01 RX ADMIN — CARVEDILOL PHOSPHATE 25 MILLIGRAM(S): 80 CAPSULE, EXTENDED RELEASE ORAL at 17:59

## 2022-01-01 RX ADMIN — Medication 5 UNIT(S): at 08:51

## 2022-01-01 RX ADMIN — Medication 500 MILLIGRAM(S): at 17:49

## 2022-01-01 RX ADMIN — Medication 1 TABLET(S): at 12:44

## 2022-01-01 RX ADMIN — Medication 1 TABLET(S): at 13:29

## 2022-01-01 RX ADMIN — ERTAPENEM SODIUM 100 MILLIGRAM(S): 1 INJECTION, POWDER, LYOPHILIZED, FOR SOLUTION INTRAMUSCULAR; INTRAVENOUS at 15:30

## 2022-01-01 RX ADMIN — Medication 9 UNIT(S): at 17:49

## 2022-01-01 RX ADMIN — Medication 1 UNIT(S): at 18:25

## 2022-01-01 RX ADMIN — Medication 500000 UNIT(S): at 11:28

## 2022-01-01 RX ADMIN — Medication 650 MILLIGRAM(S): at 10:25

## 2022-01-01 RX ADMIN — Medication 8 UNIT(S): at 17:18

## 2022-01-01 RX ADMIN — MYCOPHENOLATE MOFETIL 1000 MILLIGRAM(S): 250 CAPSULE ORAL at 08:28

## 2022-01-01 RX ADMIN — Medication 60 MILLIGRAM(S): at 05:32

## 2022-01-01 RX ADMIN — VALGANCICLOVIR 450 MILLIGRAM(S): 450 TABLET, FILM COATED ORAL at 12:02

## 2022-01-01 RX ADMIN — Medication 1 TABLET(S): at 11:53

## 2022-01-01 RX ADMIN — MAGNESIUM OXIDE 400 MG ORAL TABLET 400 MILLIGRAM(S): 241.3 TABLET ORAL at 05:32

## 2022-01-01 RX ADMIN — Medication 2: at 05:07

## 2022-01-01 RX ADMIN — Medication 6 UNIT(S): at 17:19

## 2022-01-01 RX ADMIN — Medication 4 UNIT(S): at 18:07

## 2022-01-01 RX ADMIN — Medication 6 MILLIGRAM(S): at 21:52

## 2022-01-01 RX ADMIN — Medication 650 MILLIGRAM(S): at 22:56

## 2022-01-01 RX ADMIN — Medication 50 MILLIGRAM(S): at 05:47

## 2022-01-01 RX ADMIN — Medication 8 MILLIGRAM(S): at 21:28

## 2022-01-01 RX ADMIN — TACROLIMUS 0.5 MILLIGRAM(S): 5 CAPSULE ORAL at 08:00

## 2022-01-01 RX ADMIN — MYCOPHENOLATE MOFETIL 1000 MILLIGRAM(S): 250 CAPSULE ORAL at 20:17

## 2022-01-01 RX ADMIN — Medication 650 MILLIGRAM(S): at 11:12

## 2022-01-01 RX ADMIN — Medication 4 UNIT(S): at 12:57

## 2022-01-01 RX ADMIN — PANTOPRAZOLE SODIUM 40 MILLIGRAM(S): 20 TABLET, DELAYED RELEASE ORAL at 05:10

## 2022-01-01 RX ADMIN — Medication 30 MILLIGRAM(S): at 17:01

## 2022-01-01 RX ADMIN — Medication 2 DROP(S): at 05:29

## 2022-01-01 RX ADMIN — POLYETHYLENE GLYCOL 3350 17 GRAM(S): 17 POWDER, FOR SOLUTION ORAL at 17:12

## 2022-01-01 RX ADMIN — Medication 10 UNIT(S): at 08:31

## 2022-01-01 RX ADMIN — Medication 100 MILLIGRAM(S): at 13:48

## 2022-01-01 RX ADMIN — Medication 1 GRAM(S): at 17:08

## 2022-01-01 RX ADMIN — Medication 100 MILLIGRAM(S): at 04:59

## 2022-01-01 RX ADMIN — Medication 1 TABLET(S): at 11:28

## 2022-01-01 RX ADMIN — Medication 25 GRAM(S): at 07:43

## 2022-01-01 RX ADMIN — DIVALPROEX SODIUM 500 MILLIGRAM(S): 500 TABLET, DELAYED RELEASE ORAL at 20:32

## 2022-01-01 RX ADMIN — DIVALPROEX SODIUM 500 MILLIGRAM(S): 500 TABLET, DELAYED RELEASE ORAL at 07:54

## 2022-01-01 RX ADMIN — CHLORHEXIDINE GLUCONATE 1 APPLICATION(S): 213 SOLUTION TOPICAL at 09:59

## 2022-01-01 RX ADMIN — Medication 4 MILLIGRAM(S): at 06:24

## 2022-01-01 RX ADMIN — Medication 650 MILLIGRAM(S): at 21:10

## 2022-01-01 RX ADMIN — LEVETIRACETAM 250 MILLIGRAM(S): 250 TABLET, FILM COATED ORAL at 17:06

## 2022-01-01 RX ADMIN — Medication 75 MILLIGRAM(S): at 13:20

## 2022-01-01 RX ADMIN — SENNA PLUS 10 MILLILITER(S): 8.6 TABLET ORAL at 11:17

## 2022-01-01 RX ADMIN — Medication 4 MILLIGRAM(S): at 05:28

## 2022-01-01 RX ADMIN — CHLORHEXIDINE GLUCONATE 1 APPLICATION(S): 213 SOLUTION TOPICAL at 07:20

## 2022-01-01 RX ADMIN — FOSPHENYTOIN 104 MILLIGRAM(S) PE: 50 INJECTION INTRAMUSCULAR; INTRAVENOUS at 08:13

## 2022-01-01 RX ADMIN — MYCOPHENOLATE MOFETIL 1 GRAM(S): 250 CAPSULE ORAL at 08:07

## 2022-01-01 RX ADMIN — HEPARIN SODIUM 5000 UNIT(S): 5000 INJECTION INTRAVENOUS; SUBCUTANEOUS at 05:24

## 2022-01-01 RX ADMIN — SODIUM CHLORIDE 100 MILLILITER(S): 9 INJECTION INTRAMUSCULAR; INTRAVENOUS; SUBCUTANEOUS at 19:12

## 2022-01-01 RX ADMIN — CHLORHEXIDINE GLUCONATE 1 APPLICATION(S): 213 SOLUTION TOPICAL at 05:57

## 2022-01-01 RX ADMIN — LEVETIRACETAM 250 MILLIGRAM(S): 250 TABLET, FILM COATED ORAL at 05:33

## 2022-01-01 RX ADMIN — Medication 2: at 06:29

## 2022-01-01 RX ADMIN — INSULIN GLARGINE 10 UNIT(S): 100 INJECTION, SOLUTION SUBCUTANEOUS at 21:34

## 2022-01-01 RX ADMIN — Medication 4 UNIT(S): at 09:15

## 2022-01-01 RX ADMIN — Medication 8 MILLIGRAM(S): at 22:25

## 2022-01-01 RX ADMIN — INSULIN GLARGINE 6 UNIT(S): 100 INJECTION, SOLUTION SUBCUTANEOUS at 22:24

## 2022-01-01 RX ADMIN — Medication 500000 UNIT(S): at 05:23

## 2022-01-01 RX ADMIN — Medication 4 MILLIGRAM(S): at 18:31

## 2022-01-01 RX ADMIN — HEPARIN SODIUM 5000 UNIT(S): 5000 INJECTION INTRAVENOUS; SUBCUTANEOUS at 05:14

## 2022-01-01 RX ADMIN — Medication 5 MILLIGRAM(S): at 05:48

## 2022-01-01 RX ADMIN — Medication 10 MILLIGRAM(S): at 05:35

## 2022-01-01 RX ADMIN — Medication 1300 MILLIGRAM(S): at 22:51

## 2022-01-01 RX ADMIN — FOSPHENYTOIN 104 MILLIGRAM(S) PE: 50 INJECTION INTRAMUSCULAR; INTRAVENOUS at 08:12

## 2022-01-01 RX ADMIN — FLUCONAZOLE 200 MILLIGRAM(S): 150 TABLET ORAL at 13:41

## 2022-01-01 RX ADMIN — Medication 100 GRAM(S): at 11:01

## 2022-01-01 RX ADMIN — Medication 5 UNIT(S): at 08:15

## 2022-01-01 RX ADMIN — FOSPHENYTOIN 104 MILLIGRAM(S) PE: 50 INJECTION INTRAMUSCULAR; INTRAVENOUS at 08:57

## 2022-01-01 RX ADMIN — MYCOPHENOLATE MOFETIL 500 MILLIGRAM(S): 250 CAPSULE ORAL at 21:21

## 2022-01-01 RX ADMIN — PANTOPRAZOLE SODIUM 40 MILLIGRAM(S): 20 TABLET, DELAYED RELEASE ORAL at 10:07

## 2022-01-01 RX ADMIN — Medication 100 MILLIGRAM(S): at 13:20

## 2022-01-01 RX ADMIN — FINASTERIDE 5 MILLIGRAM(S): 5 TABLET, FILM COATED ORAL at 12:06

## 2022-01-01 RX ADMIN — INSULIN HUMAN 5 UNIT(S): 100 INJECTION, SOLUTION SUBCUTANEOUS at 21:05

## 2022-01-01 RX ADMIN — MYCOPHENOLATE MOFETIL 1 GRAM(S): 250 CAPSULE ORAL at 19:44

## 2022-01-01 RX ADMIN — Medication 4: at 18:03

## 2022-01-01 RX ADMIN — BRIVARACETAM 50 MILLIGRAM(S): 25 TABLET, FILM COATED ORAL at 06:08

## 2022-01-01 RX ADMIN — AMLODIPINE BESYLATE 10 MILLIGRAM(S): 2.5 TABLET ORAL at 05:33

## 2022-01-01 RX ADMIN — Medication 650 MILLIGRAM(S): at 23:50

## 2022-01-01 RX ADMIN — MAGNESIUM OXIDE 400 MG ORAL TABLET 400 MILLIGRAM(S): 241.3 TABLET ORAL at 21:16

## 2022-01-01 RX ADMIN — Medication 500 MILLIGRAM(S): at 18:26

## 2022-01-01 RX ADMIN — Medication 4 MILLIGRAM(S): at 17:00

## 2022-01-01 RX ADMIN — Medication 10 MILLIGRAM(S): at 05:03

## 2022-01-01 RX ADMIN — PANTOPRAZOLE SODIUM 40 MILLIGRAM(S): 20 TABLET, DELAYED RELEASE ORAL at 11:01

## 2022-01-01 RX ADMIN — Medication 650 MILLIGRAM(S): at 05:05

## 2022-01-01 RX ADMIN — SODIUM ZIRCONIUM CYCLOSILICATE 10 GRAM(S): 10 POWDER, FOR SUSPENSION ORAL at 16:09

## 2022-01-01 RX ADMIN — INSULIN GLARGINE 5 UNIT(S): 100 INJECTION, SOLUTION SUBCUTANEOUS at 21:02

## 2022-01-01 RX ADMIN — Medication 25 GRAM(S): at 14:21

## 2022-01-01 RX ADMIN — Medication 200 MILLIGRAM(S): at 18:40

## 2022-01-01 RX ADMIN — DIVALPROEX SODIUM 1000 MILLIGRAM(S): 500 TABLET, DELAYED RELEASE ORAL at 05:13

## 2022-01-01 RX ADMIN — Medication 650 MILLIGRAM(S): at 05:35

## 2022-01-01 RX ADMIN — Medication 80 MILLIGRAM(S): at 05:56

## 2022-01-01 RX ADMIN — Medication 5 UNIT(S): at 17:28

## 2022-01-01 RX ADMIN — FINASTERIDE 5 MILLIGRAM(S): 5 TABLET, FILM COATED ORAL at 12:24

## 2022-01-01 RX ADMIN — HYDROMORPHONE HYDROCHLORIDE 0.5 MILLIGRAM(S): 2 INJECTION INTRAMUSCULAR; INTRAVENOUS; SUBCUTANEOUS at 08:56

## 2022-01-01 RX ADMIN — Medication 100 MILLIGRAM(S): at 20:28

## 2022-01-01 RX ADMIN — Medication 200 MILLIGRAM(S): at 18:19

## 2022-01-01 RX ADMIN — Medication 6 MILLIGRAM(S): at 21:25

## 2022-01-01 RX ADMIN — Medication 25 MILLIGRAM(S): at 13:30

## 2022-01-01 RX ADMIN — LEVETIRACETAM 250 MILLIGRAM(S): 250 TABLET, FILM COATED ORAL at 18:04

## 2022-01-01 RX ADMIN — Medication 10 MILLIGRAM(S): at 06:00

## 2022-01-01 RX ADMIN — MYCOPHENOLATE MOFETIL 250 MILLIGRAM(S): 250 CAPSULE ORAL at 08:34

## 2022-01-01 RX ADMIN — POLYETHYLENE GLYCOL 3350 17 GRAM(S): 17 POWDER, FOR SOLUTION ORAL at 12:24

## 2022-01-01 RX ADMIN — CHLORHEXIDINE GLUCONATE 1 APPLICATION(S): 213 SOLUTION TOPICAL at 07:45

## 2022-01-01 RX ADMIN — Medication 4 MILLIGRAM(S): at 05:21

## 2022-01-01 RX ADMIN — WARFARIN SODIUM 6 MILLIGRAM(S): 2.5 TABLET ORAL at 21:30

## 2022-01-01 RX ADMIN — HEPARIN SODIUM 5000 UNIT(S): 5000 INJECTION INTRAVENOUS; SUBCUTANEOUS at 17:29

## 2022-01-01 RX ADMIN — AMLODIPINE BESYLATE 10 MILLIGRAM(S): 2.5 TABLET ORAL at 05:49

## 2022-01-01 RX ADMIN — Medication 500000 UNIT(S): at 17:29

## 2022-01-01 RX ADMIN — Medication 10 MILLIGRAM(S): at 07:49

## 2022-01-01 RX ADMIN — Medication 1 TABLET(S): at 12:01

## 2022-01-01 RX ADMIN — Medication 10 MILLIGRAM(S): at 05:04

## 2022-01-01 RX ADMIN — LEVETIRACETAM 400 MILLIGRAM(S): 250 TABLET, FILM COATED ORAL at 17:11

## 2022-01-01 RX ADMIN — Medication 500000 UNIT(S): at 01:07

## 2022-01-01 RX ADMIN — AMLODIPINE BESYLATE 10 MILLIGRAM(S): 2.5 TABLET ORAL at 07:44

## 2022-01-01 RX ADMIN — MYCOPHENOLATE MOFETIL 250 MILLIGRAM(S): 250 CAPSULE ORAL at 08:29

## 2022-01-01 RX ADMIN — CHLORHEXIDINE GLUCONATE 15 MILLILITER(S): 213 SOLUTION TOPICAL at 18:23

## 2022-01-01 RX ADMIN — INSULIN HUMAN 4 UNIT(S)/HR: 100 INJECTION, SOLUTION SUBCUTANEOUS at 06:00

## 2022-01-01 RX ADMIN — PERAMPANEL 6 MILLIGRAM(S): 2 TABLET ORAL at 14:26

## 2022-01-01 RX ADMIN — FOSPHENYTOIN 104 MILLIGRAM(S) PE: 50 INJECTION INTRAMUSCULAR; INTRAVENOUS at 16:00

## 2022-01-01 RX ADMIN — CHLORHEXIDINE GLUCONATE 1 APPLICATION(S): 213 SOLUTION TOPICAL at 05:19

## 2022-01-01 RX ADMIN — MAGNESIUM OXIDE 400 MG ORAL TABLET 400 MILLIGRAM(S): 241.3 TABLET ORAL at 18:29

## 2022-01-01 RX ADMIN — Medication 6: at 09:03

## 2022-01-01 RX ADMIN — VALGANCICLOVIR 450 MILLIGRAM(S): 450 TABLET, FILM COATED ORAL at 13:08

## 2022-01-01 RX ADMIN — SODIUM ZIRCONIUM CYCLOSILICATE 10 GRAM(S): 10 POWDER, FOR SUSPENSION ORAL at 12:00

## 2022-01-01 RX ADMIN — MYCOPHENOLATE MOFETIL 500 MILLIGRAM(S): 250 CAPSULE ORAL at 08:12

## 2022-01-01 RX ADMIN — PANTOPRAZOLE SODIUM 40 MILLIGRAM(S): 20 TABLET, DELAYED RELEASE ORAL at 11:41

## 2022-01-01 RX ADMIN — Medication 5 UNIT(S): at 12:48

## 2022-01-01 RX ADMIN — INSULIN GLARGINE 5 UNIT(S): 100 INJECTION, SOLUTION SUBCUTANEOUS at 22:31

## 2022-01-01 RX ADMIN — AMLODIPINE BESYLATE 10 MILLIGRAM(S): 2.5 TABLET ORAL at 05:19

## 2022-01-01 RX ADMIN — Medication 500000 UNIT(S): at 06:08

## 2022-01-01 RX ADMIN — BRIVARACETAM 50 MILLIGRAM(S): 25 TABLET, FILM COATED ORAL at 06:14

## 2022-01-01 RX ADMIN — PANTOPRAZOLE SODIUM 40 MILLIGRAM(S): 20 TABLET, DELAYED RELEASE ORAL at 17:24

## 2022-01-01 RX ADMIN — SENNA PLUS 2 TABLET(S): 8.6 TABLET ORAL at 21:09

## 2022-01-01 RX ADMIN — LEVETIRACETAM 250 MILLIGRAM(S): 250 TABLET, FILM COATED ORAL at 18:03

## 2022-01-01 RX ADMIN — Medication 10 MILLIGRAM(S): at 06:10

## 2022-01-01 RX ADMIN — Medication 100 MILLIGRAM(S): at 13:16

## 2022-01-01 RX ADMIN — AMLODIPINE BESYLATE 10 MILLIGRAM(S): 2.5 TABLET ORAL at 05:14

## 2022-01-01 RX ADMIN — MYCOPHENOLATE MOFETIL 250 MILLIGRAM(S): 250 CAPSULE ORAL at 07:43

## 2022-01-01 RX ADMIN — Medication 500000 UNIT(S): at 23:37

## 2022-01-01 RX ADMIN — POLYETHYLENE GLYCOL 3350 17 GRAM(S): 17 POWDER, FOR SOLUTION ORAL at 12:15

## 2022-01-01 RX ADMIN — SENNA PLUS 10 MILLILITER(S): 8.6 TABLET ORAL at 12:46

## 2022-01-01 RX ADMIN — HEPARIN SODIUM 5000 UNIT(S): 5000 INJECTION INTRAVENOUS; SUBCUTANEOUS at 17:48

## 2022-01-01 RX ADMIN — AMLODIPINE BESYLATE 10 MILLIGRAM(S): 2.5 TABLET ORAL at 06:07

## 2022-01-01 RX ADMIN — Medication 4 MILLIGRAM(S): at 05:27

## 2022-01-01 RX ADMIN — CHLORHEXIDINE GLUCONATE 1 APPLICATION(S): 213 SOLUTION TOPICAL at 08:29

## 2022-01-01 RX ADMIN — APIXABAN 2.5 MILLIGRAM(S): 2.5 TABLET, FILM COATED ORAL at 17:10

## 2022-01-01 RX ADMIN — Medication 60 MILLIGRAM(S): at 17:16

## 2022-01-01 RX ADMIN — LEVETIRACETAM 250 MILLIGRAM(S): 250 TABLET, FILM COATED ORAL at 17:21

## 2022-01-01 RX ADMIN — PANTOPRAZOLE SODIUM 40 MILLIGRAM(S): 20 TABLET, DELAYED RELEASE ORAL at 05:41

## 2022-01-01 RX ADMIN — SENNA PLUS 2 TABLET(S): 8.6 TABLET ORAL at 21:24

## 2022-01-01 RX ADMIN — Medication 2 DROP(S): at 17:37

## 2022-01-01 RX ADMIN — Medication 4 MILLIGRAM(S): at 17:12

## 2022-01-01 RX ADMIN — Medication 500000 UNIT(S): at 17:21

## 2022-01-01 RX ADMIN — PERAMPANEL 4 MILLIGRAM(S): 2 TABLET ORAL at 21:21

## 2022-01-01 RX ADMIN — Medication 650 MILLIGRAM(S): at 08:13

## 2022-01-01 RX ADMIN — TACROLIMUS 1 MILLIGRAM(S): 5 CAPSULE ORAL at 20:02

## 2022-01-01 RX ADMIN — HALOPERIDOL DECANOATE 2 MILLIGRAM(S): 100 INJECTION INTRAMUSCULAR at 00:40

## 2022-01-01 RX ADMIN — SIROLIMUS 2 MILLIGRAM(S): 1 SOLUTION ORAL at 08:06

## 2022-01-01 RX ADMIN — Medication 500000 UNIT(S): at 13:18

## 2022-01-01 RX ADMIN — Medication 100 MILLIGRAM(S): at 14:22

## 2022-01-01 RX ADMIN — Medication 2 DROP(S): at 17:41

## 2022-01-01 RX ADMIN — Medication 500000 UNIT(S): at 17:16

## 2022-01-01 RX ADMIN — Medication 9 UNIT(S): at 17:50

## 2022-01-01 RX ADMIN — CARVEDILOL PHOSPHATE 12.5 MILLIGRAM(S): 80 CAPSULE, EXTENDED RELEASE ORAL at 18:18

## 2022-01-01 RX ADMIN — Medication 4 MILLIGRAM(S): at 06:27

## 2022-01-01 RX ADMIN — Medication 4 UNIT(S): at 17:37

## 2022-01-01 RX ADMIN — Medication 5 UNIT(S): at 13:06

## 2022-01-01 RX ADMIN — Medication 50 MILLIGRAM(S): at 09:17

## 2022-01-01 RX ADMIN — Medication 2: at 22:27

## 2022-01-01 RX ADMIN — Medication 4 MILLIGRAM(S): at 05:09

## 2022-01-01 RX ADMIN — CHLORHEXIDINE GLUCONATE 15 MILLILITER(S): 213 SOLUTION TOPICAL at 17:08

## 2022-01-01 RX ADMIN — Medication 200 MILLIGRAM(S): at 05:51

## 2022-01-01 RX ADMIN — SEVELAMER CARBONATE 800 MILLIGRAM(S): 2400 POWDER, FOR SUSPENSION ORAL at 17:12

## 2022-01-01 RX ADMIN — Medication 1000 MILLILITER(S): at 08:18

## 2022-01-01 RX ADMIN — Medication 200 MILLIGRAM(S): at 06:23

## 2022-01-01 RX ADMIN — Medication 4 UNIT(S): at 17:29

## 2022-01-01 RX ADMIN — FLUCONAZOLE 100 MILLIGRAM(S): 150 TABLET ORAL at 15:20

## 2022-01-01 RX ADMIN — Medication 5 UNIT(S): at 11:11

## 2022-01-01 RX ADMIN — Medication 3 UNIT(S): at 07:56

## 2022-01-01 RX ADMIN — Medication 100 MILLIGRAM(S): at 13:03

## 2022-01-01 RX ADMIN — Medication 9 UNIT(S): at 17:53

## 2022-01-01 RX ADMIN — PANTOPRAZOLE SODIUM 40 MILLIGRAM(S): 20 TABLET, DELAYED RELEASE ORAL at 09:14

## 2022-01-01 RX ADMIN — ATORVASTATIN CALCIUM 40 MILLIGRAM(S): 80 TABLET, FILM COATED ORAL at 21:12

## 2022-01-01 RX ADMIN — Medication 9 UNIT(S): at 09:23

## 2022-01-01 RX ADMIN — CHLORHEXIDINE GLUCONATE 1 APPLICATION(S): 213 SOLUTION TOPICAL at 09:07

## 2022-01-01 RX ADMIN — Medication 500000 UNIT(S): at 17:43

## 2022-01-01 RX ADMIN — WARFARIN SODIUM 5 MILLIGRAM(S): 2.5 TABLET ORAL at 22:25

## 2022-01-01 RX ADMIN — Medication 2 DROP(S): at 06:00

## 2022-01-01 RX ADMIN — AMLODIPINE BESYLATE 10 MILLIGRAM(S): 2.5 TABLET ORAL at 05:55

## 2022-01-01 RX ADMIN — MYCOPHENOLATE MOFETIL 1000 MILLIGRAM(S): 250 CAPSULE ORAL at 20:39

## 2022-01-01 RX ADMIN — Medication 200 MILLIGRAM(S): at 05:54

## 2022-01-01 RX ADMIN — Medication 40 MICROGRAM(S): at 22:50

## 2022-01-01 RX ADMIN — Medication 2: at 08:59

## 2022-01-01 RX ADMIN — Medication 500000 UNIT(S): at 12:12

## 2022-01-01 RX ADMIN — Medication 2: at 13:18

## 2022-01-01 RX ADMIN — Medication 5 MILLIGRAM(S): at 06:25

## 2022-01-01 RX ADMIN — Medication 6 MILLIGRAM(S): at 21:51

## 2022-01-01 RX ADMIN — ERYTHROPOIETIN 10000 UNIT(S): 10000 INJECTION, SOLUTION INTRAVENOUS; SUBCUTANEOUS at 15:51

## 2022-01-01 RX ADMIN — Medication 5 UNIT(S): at 08:38

## 2022-01-01 RX ADMIN — MYCOPHENOLATE MOFETIL 1000 MILLIGRAM(S): 250 CAPSULE ORAL at 17:17

## 2022-01-01 RX ADMIN — HEPARIN SODIUM 5000 UNIT(S): 5000 INJECTION INTRAVENOUS; SUBCUTANEOUS at 17:49

## 2022-01-01 RX ADMIN — ENOXAPARIN SODIUM 60 MILLIGRAM(S): 100 INJECTION SUBCUTANEOUS at 20:36

## 2022-01-01 RX ADMIN — Medication 500 MILLIGRAM(S): at 17:46

## 2022-01-01 RX ADMIN — Medication 500000 UNIT(S): at 00:21

## 2022-01-01 RX ADMIN — CHLORHEXIDINE GLUCONATE 1 APPLICATION(S): 213 SOLUTION TOPICAL at 08:04

## 2022-01-01 RX ADMIN — BRIVARACETAM 25 MILLIGRAM(S): 25 TABLET, FILM COATED ORAL at 17:58

## 2022-01-01 RX ADMIN — PANTOPRAZOLE SODIUM 40 MILLIGRAM(S): 20 TABLET, DELAYED RELEASE ORAL at 11:38

## 2022-01-01 RX ADMIN — Medication 2 DROP(S): at 17:32

## 2022-01-01 RX ADMIN — Medication 6 MILLIGRAM(S): at 21:43

## 2022-01-01 RX ADMIN — Medication 2: at 17:12

## 2022-01-01 RX ADMIN — ERTAPENEM SODIUM 120 MILLIGRAM(S): 1 INJECTION, POWDER, LYOPHILIZED, FOR SOLUTION INTRAMUSCULAR; INTRAVENOUS at 09:39

## 2022-01-01 RX ADMIN — SEVELAMER CARBONATE 800 MILLIGRAM(S): 2400 POWDER, FOR SUSPENSION ORAL at 17:42

## 2022-01-01 RX ADMIN — Medication 30 MILLIGRAM(S): at 05:35

## 2022-01-01 RX ADMIN — Medication 500000 UNIT(S): at 22:19

## 2022-01-01 RX ADMIN — Medication 500000 UNIT(S): at 06:42

## 2022-01-01 RX ADMIN — CARVEDILOL PHOSPHATE 6.25 MILLIGRAM(S): 80 CAPSULE, EXTENDED RELEASE ORAL at 23:41

## 2022-01-01 RX ADMIN — Medication 5 UNIT(S): at 17:56

## 2022-01-01 RX ADMIN — Medication 3 MILLIGRAM(S): at 21:15

## 2022-01-01 RX ADMIN — PANTOPRAZOLE SODIUM 40 MILLIGRAM(S): 20 TABLET, DELAYED RELEASE ORAL at 05:57

## 2022-01-01 RX ADMIN — CARVEDILOL PHOSPHATE 12.5 MILLIGRAM(S): 80 CAPSULE, EXTENDED RELEASE ORAL at 05:13

## 2022-01-01 RX ADMIN — Medication 975 MILLIGRAM(S): at 09:00

## 2022-01-01 RX ADMIN — MYCOPHENOLATE MOFETIL 500 MILLIGRAM(S): 250 CAPSULE ORAL at 20:15

## 2022-01-01 RX ADMIN — Medication 650 MILLIGRAM(S): at 17:00

## 2022-01-01 RX ADMIN — Medication 6 MILLIGRAM(S): at 21:36

## 2022-01-01 RX ADMIN — CARVEDILOL PHOSPHATE 12.5 MILLIGRAM(S): 80 CAPSULE, EXTENDED RELEASE ORAL at 17:09

## 2022-01-01 RX ADMIN — Medication 1 TABLET(S): at 11:27

## 2022-01-01 RX ADMIN — Medication 1 TABLET(S): at 13:00

## 2022-01-01 RX ADMIN — Medication 1300 MILLIGRAM(S): at 05:51

## 2022-01-01 RX ADMIN — Medication 50 MICROGRAM(S): at 05:45

## 2022-01-01 RX ADMIN — Medication 0.5 MILLIGRAM(S): at 00:01

## 2022-01-01 RX ADMIN — ERYTHROPOIETIN 10000 UNIT(S): 10000 INJECTION, SOLUTION INTRAVENOUS; SUBCUTANEOUS at 22:48

## 2022-01-01 RX ADMIN — VALGANCICLOVIR 450 MILLIGRAM(S): 450 TABLET, FILM COATED ORAL at 12:20

## 2022-01-01 RX ADMIN — Medication 100 MILLIGRAM(S): at 22:09

## 2022-01-01 RX ADMIN — FOSPHENYTOIN 108 MILLIGRAM(S) PE: 50 INJECTION INTRAMUSCULAR; INTRAVENOUS at 06:30

## 2022-01-01 RX ADMIN — Medication 4: at 13:12

## 2022-01-01 RX ADMIN — LEVETIRACETAM 250 MILLIGRAM(S): 250 TABLET, FILM COATED ORAL at 05:36

## 2022-01-01 RX ADMIN — Medication 200 MILLIGRAM(S): at 05:20

## 2022-01-01 RX ADMIN — Medication 1300 MILLIGRAM(S): at 07:46

## 2022-01-01 RX ADMIN — Medication 9 UNIT(S): at 13:21

## 2022-01-01 RX ADMIN — CARVEDILOL PHOSPHATE 12.5 MILLIGRAM(S): 80 CAPSULE, EXTENDED RELEASE ORAL at 17:38

## 2022-01-01 RX ADMIN — Medication 650 MILLIGRAM(S): at 17:08

## 2022-01-01 RX ADMIN — Medication 650 MILLIGRAM(S): at 14:00

## 2022-01-01 RX ADMIN — FINASTERIDE 5 MILLIGRAM(S): 5 TABLET, FILM COATED ORAL at 13:19

## 2022-01-01 RX ADMIN — Medication 100 MILLIGRAM(S): at 22:05

## 2022-01-01 RX ADMIN — SODIUM CHLORIDE 25 MILLILITER(S): 9 INJECTION INTRAMUSCULAR; INTRAVENOUS; SUBCUTANEOUS at 20:37

## 2022-01-01 RX ADMIN — ATORVASTATIN CALCIUM 40 MILLIGRAM(S): 80 TABLET, FILM COATED ORAL at 20:04

## 2022-01-01 RX ADMIN — Medication 650 MILLIGRAM(S): at 05:19

## 2022-01-01 RX ADMIN — Medication 9 UNIT(S): at 17:52

## 2022-01-01 RX ADMIN — HEPARIN SODIUM 5000 UNIT(S): 5000 INJECTION INTRAVENOUS; SUBCUTANEOUS at 05:49

## 2022-01-01 RX ADMIN — Medication 650 MILLIGRAM(S): at 05:44

## 2022-01-01 RX ADMIN — Medication 2: at 17:42

## 2022-01-01 RX ADMIN — Medication 250 MILLIGRAM(S): at 12:49

## 2022-01-01 RX ADMIN — INSULIN GLARGINE 4 UNIT(S): 100 INJECTION, SOLUTION SUBCUTANEOUS at 21:39

## 2022-01-01 RX ADMIN — INSULIN HUMAN 3 UNIT(S): 100 INJECTION, SOLUTION SUBCUTANEOUS at 00:17

## 2022-01-01 RX ADMIN — SEVELAMER CARBONATE 800 MILLIGRAM(S): 2400 POWDER, FOR SUSPENSION ORAL at 07:57

## 2022-01-01 RX ADMIN — Medication 100 MILLIGRAM(S): at 21:07

## 2022-01-01 RX ADMIN — Medication 4 UNIT(S): at 12:54

## 2022-01-01 RX ADMIN — Medication 500000 UNIT(S): at 00:59

## 2022-01-01 RX ADMIN — Medication 500000 UNIT(S): at 05:47

## 2022-01-01 RX ADMIN — FOSPHENYTOIN 128 MILLIGRAM(S) PE: 50 INJECTION INTRAMUSCULAR; INTRAVENOUS at 10:11

## 2022-01-01 RX ADMIN — Medication 50 MILLILITER(S): at 04:50

## 2022-01-01 RX ADMIN — MONTELUKAST 10 MILLIGRAM(S): 4 TABLET, CHEWABLE ORAL at 12:03

## 2022-01-01 RX ADMIN — Medication 4: at 11:08

## 2022-01-01 RX ADMIN — BRIVARACETAM 50 MILLIGRAM(S): 25 TABLET, FILM COATED ORAL at 05:46

## 2022-01-01 RX ADMIN — POLYETHYLENE GLYCOL 3350 17 GRAM(S): 17 POWDER, FOR SOLUTION ORAL at 11:01

## 2022-01-01 RX ADMIN — CHLORHEXIDINE GLUCONATE 1 APPLICATION(S): 213 SOLUTION TOPICAL at 09:02

## 2022-01-01 RX ADMIN — PERAMPANEL 4 MILLIGRAM(S): 2 TABLET ORAL at 21:26

## 2022-01-01 RX ADMIN — Medication 2 DROP(S): at 11:31

## 2022-01-01 RX ADMIN — Medication 4 MILLIGRAM(S): at 19:24

## 2022-01-01 RX ADMIN — Medication 2 DROP(S): at 18:14

## 2022-01-01 RX ADMIN — CHLORHEXIDINE GLUCONATE 1 APPLICATION(S): 213 SOLUTION TOPICAL at 09:35

## 2022-01-01 RX ADMIN — Medication 6 MILLIGRAM(S): at 22:10

## 2022-01-01 RX ADMIN — Medication 500000 UNIT(S): at 17:28

## 2022-01-01 RX ADMIN — AMLODIPINE BESYLATE 10 MILLIGRAM(S): 2.5 TABLET ORAL at 05:59

## 2022-01-01 RX ADMIN — AMLODIPINE BESYLATE 10 MILLIGRAM(S): 2.5 TABLET ORAL at 05:17

## 2022-01-01 RX ADMIN — SENNA PLUS 2 TABLET(S): 8.6 TABLET ORAL at 21:27

## 2022-01-01 RX ADMIN — SIROLIMUS 7 MILLIGRAM(S): 1 SOLUTION ORAL at 08:58

## 2022-01-01 RX ADMIN — Medication 2 DROP(S): at 23:12

## 2022-01-01 RX ADMIN — ATORVASTATIN CALCIUM 40 MILLIGRAM(S): 80 TABLET, FILM COATED ORAL at 21:09

## 2022-01-01 RX ADMIN — Medication 75 MILLIGRAM(S): at 05:43

## 2022-01-01 RX ADMIN — PANTOPRAZOLE SODIUM 40 MILLIGRAM(S): 20 TABLET, DELAYED RELEASE ORAL at 06:26

## 2022-01-01 RX ADMIN — LEVETIRACETAM 400 MILLIGRAM(S): 250 TABLET, FILM COATED ORAL at 15:57

## 2022-01-01 RX ADMIN — Medication 1: at 13:33

## 2022-01-01 RX ADMIN — Medication 4 UNIT(S): at 12:55

## 2022-01-01 RX ADMIN — Medication 500000 UNIT(S): at 05:17

## 2022-01-01 RX ADMIN — FLUCONAZOLE 100 MILLIGRAM(S): 150 TABLET ORAL at 07:13

## 2022-01-01 RX ADMIN — MYCOPHENOLATE MOFETIL 1000 MILLIGRAM(S): 250 CAPSULE ORAL at 05:35

## 2022-01-01 RX ADMIN — Medication 50 MILLIGRAM(S): at 06:47

## 2022-01-01 RX ADMIN — ALBUTEROL 2.5 MILLIGRAM(S): 90 AEROSOL, METERED ORAL at 21:38

## 2022-01-01 RX ADMIN — HEPARIN SODIUM 5000 UNIT(S): 5000 INJECTION INTRAVENOUS; SUBCUTANEOUS at 06:09

## 2022-01-01 RX ADMIN — Medication 25 GRAM(S): at 02:57

## 2022-01-01 RX ADMIN — Medication 500000 UNIT(S): at 13:17

## 2022-01-01 RX ADMIN — MYCOPHENOLATE MOFETIL 1000 MILLIGRAM(S): 250 CAPSULE ORAL at 07:29

## 2022-01-01 RX ADMIN — Medication 8 MILLIGRAM(S): at 21:39

## 2022-01-01 RX ADMIN — ATORVASTATIN CALCIUM 40 MILLIGRAM(S): 80 TABLET, FILM COATED ORAL at 21:11

## 2022-01-01 RX ADMIN — MAGNESIUM OXIDE 400 MG ORAL TABLET 400 MILLIGRAM(S): 241.3 TABLET ORAL at 22:35

## 2022-01-01 RX ADMIN — HEPARIN SODIUM 5000 UNIT(S): 5000 INJECTION INTRAVENOUS; SUBCUTANEOUS at 18:27

## 2022-01-01 RX ADMIN — Medication 5 UNIT(S): at 17:07

## 2022-01-01 RX ADMIN — Medication 2: at 09:10

## 2022-01-01 RX ADMIN — HEPARIN SODIUM 5000 UNIT(S): 5000 INJECTION INTRAVENOUS; SUBCUTANEOUS at 22:19

## 2022-01-01 RX ADMIN — ERYTHROPOIETIN 10000 UNIT(S): 10000 INJECTION, SOLUTION INTRAVENOUS; SUBCUTANEOUS at 11:32

## 2022-01-01 RX ADMIN — MYCOPHENOLATE MOFETIL 500 MILLIGRAM(S): 250 CAPSULE ORAL at 20:41

## 2022-01-01 RX ADMIN — Medication 25 MILLIGRAM(S): at 15:00

## 2022-01-01 RX ADMIN — Medication 500000 UNIT(S): at 11:48

## 2022-01-01 RX ADMIN — Medication 0.5 MILLIGRAM(S): at 17:30

## 2022-01-01 RX ADMIN — Medication 650 MILLIGRAM(S): at 16:38

## 2022-01-01 RX ADMIN — CARVEDILOL PHOSPHATE 12.5 MILLIGRAM(S): 80 CAPSULE, EXTENDED RELEASE ORAL at 17:02

## 2022-01-01 RX ADMIN — Medication 60 MILLIGRAM(S): at 05:34

## 2022-01-01 RX ADMIN — Medication 1 GRAM(S): at 17:11

## 2022-01-01 RX ADMIN — LEVETIRACETAM 250 MILLIGRAM(S): 250 TABLET, FILM COATED ORAL at 05:32

## 2022-01-01 RX ADMIN — Medication 500000 UNIT(S): at 17:39

## 2022-01-01 RX ADMIN — AMLODIPINE BESYLATE 10 MILLIGRAM(S): 2.5 TABLET ORAL at 05:43

## 2022-01-01 RX ADMIN — Medication 25 GRAM(S): at 08:43

## 2022-01-01 RX ADMIN — Medication 667 MILLIGRAM(S): at 17:06

## 2022-01-01 RX ADMIN — Medication 650 MILLIGRAM(S): at 15:45

## 2022-01-01 RX ADMIN — Medication 5 MILLIGRAM(S): at 05:29

## 2022-01-01 RX ADMIN — MAGNESIUM HYDROXIDE 30 MILLILITER(S): 400 TABLET, CHEWABLE ORAL at 11:37

## 2022-01-01 RX ADMIN — Medication 1: at 18:21

## 2022-01-01 RX ADMIN — INSULIN GLARGINE 5 UNIT(S): 100 INJECTION, SOLUTION SUBCUTANEOUS at 23:04

## 2022-01-01 RX ADMIN — Medication 975 MILLIGRAM(S): at 11:21

## 2022-01-01 RX ADMIN — ENOXAPARIN SODIUM 60 MILLIGRAM(S): 100 INJECTION SUBCUTANEOUS at 17:40

## 2022-01-01 RX ADMIN — Medication 100 MILLIGRAM(S): at 13:47

## 2022-01-01 RX ADMIN — Medication 100 MILLIGRAM(S): at 20:07

## 2022-01-01 RX ADMIN — HEPARIN SODIUM 5000 UNIT(S): 5000 INJECTION INTRAVENOUS; SUBCUTANEOUS at 05:03

## 2022-01-01 RX ADMIN — APIXABAN 5 MILLIGRAM(S): 2.5 TABLET, FILM COATED ORAL at 05:46

## 2022-01-01 RX ADMIN — Medication 500000 UNIT(S): at 05:44

## 2022-01-01 RX ADMIN — SIROLIMUS 7 MILLIGRAM(S): 1 SOLUTION ORAL at 08:47

## 2022-01-01 RX ADMIN — BRIVARACETAM 50 MILLIGRAM(S): 25 TABLET, FILM COATED ORAL at 21:55

## 2022-01-01 RX ADMIN — Medication 500000 UNIT(S): at 11:53

## 2022-01-01 RX ADMIN — Medication 2: at 17:20

## 2022-01-01 RX ADMIN — SIROLIMUS 7 MILLIGRAM(S): 1 SOLUTION ORAL at 08:37

## 2022-01-01 RX ADMIN — Medication 1 TABLET(S): at 11:13

## 2022-01-01 RX ADMIN — Medication 100 MILLIGRAM(S): at 21:13

## 2022-01-01 RX ADMIN — Medication 8 MILLIGRAM(S): at 22:19

## 2022-01-01 RX ADMIN — FOSPHENYTOIN 104 MILLIGRAM(S) PE: 50 INJECTION INTRAMUSCULAR; INTRAVENOUS at 05:41

## 2022-01-01 RX ADMIN — Medication 4 UNIT(S): at 13:15

## 2022-01-01 RX ADMIN — PERAMPANEL 4 MILLIGRAM(S): 2 TABLET ORAL at 22:50

## 2022-01-01 RX ADMIN — Medication 50 MICROGRAM(S): at 05:15

## 2022-01-01 RX ADMIN — Medication 1 APPLICATION(S): at 17:13

## 2022-01-01 RX ADMIN — Medication 650 MILLIGRAM(S): at 19:52

## 2022-01-01 RX ADMIN — Medication 50 MICROGRAM(S): at 05:23

## 2022-01-01 RX ADMIN — VALGANCICLOVIR 450 MILLIGRAM(S): 450 TABLET, FILM COATED ORAL at 13:57

## 2022-01-01 RX ADMIN — Medication 500000 UNIT(S): at 00:31

## 2022-01-01 RX ADMIN — LEVETIRACETAM 400 MILLIGRAM(S): 250 TABLET, FILM COATED ORAL at 12:06

## 2022-01-01 RX ADMIN — Medication 500000 UNIT(S): at 05:49

## 2022-01-01 RX ADMIN — CARVEDILOL PHOSPHATE 12.5 MILLIGRAM(S): 80 CAPSULE, EXTENDED RELEASE ORAL at 17:22

## 2022-01-01 RX ADMIN — ATORVASTATIN CALCIUM 40 MILLIGRAM(S): 80 TABLET, FILM COATED ORAL at 21:51

## 2022-01-01 RX ADMIN — PIPERACILLIN AND TAZOBACTAM 25 GRAM(S): 4; .5 INJECTION, POWDER, LYOPHILIZED, FOR SOLUTION INTRAVENOUS at 23:50

## 2022-01-01 RX ADMIN — Medication 40 MICROGRAM(S): at 21:21

## 2022-01-01 RX ADMIN — Medication 650 MILLIGRAM(S): at 21:34

## 2022-01-01 RX ADMIN — Medication 650 MILLIGRAM(S): at 06:00

## 2022-01-01 RX ADMIN — MYCOPHENOLATE MOFETIL 1000 MILLIGRAM(S): 250 CAPSULE ORAL at 20:08

## 2022-01-01 RX ADMIN — FOSPHENYTOIN 104 MILLIGRAM(S) PE: 50 INJECTION INTRAMUSCULAR; INTRAVENOUS at 08:59

## 2022-01-01 RX ADMIN — Medication 500 MILLIGRAM(S): at 17:33

## 2022-01-01 RX ADMIN — Medication 500000 UNIT(S): at 23:36

## 2022-01-01 RX ADMIN — Medication 125 MILLIGRAM(S): at 12:21

## 2022-01-01 RX ADMIN — Medication 2 DROP(S): at 22:06

## 2022-01-01 RX ADMIN — Medication 100 MILLIGRAM(S): at 21:20

## 2022-01-01 RX ADMIN — BELATACEPT 200 MILLIGRAM(S): 250 INJECTION, POWDER, LYOPHILIZED, FOR SOLUTION INTRAVENOUS at 10:55

## 2022-01-01 RX ADMIN — Medication 1 TABLET(S): at 13:30

## 2022-01-01 RX ADMIN — Medication 2: at 08:13

## 2022-01-01 RX ADMIN — Medication 1 TABLET(S): at 11:35

## 2022-01-01 RX ADMIN — AMLODIPINE BESYLATE 10 MILLIGRAM(S): 2.5 TABLET ORAL at 08:28

## 2022-01-01 RX ADMIN — Medication 75 MILLIGRAM(S): at 20:16

## 2022-01-01 RX ADMIN — TACROLIMUS 1 MILLIGRAM(S): 5 CAPSULE ORAL at 08:59

## 2022-01-01 RX ADMIN — Medication 25 GRAM(S): at 02:26

## 2022-01-01 RX ADMIN — Medication 2 DROP(S): at 23:03

## 2022-01-01 RX ADMIN — Medication 2 DROP(S): at 00:12

## 2022-01-01 RX ADMIN — PANTOPRAZOLE SODIUM 40 MILLIGRAM(S): 20 TABLET, DELAYED RELEASE ORAL at 13:21

## 2022-01-01 RX ADMIN — VALGANCICLOVIR 450 MILLIGRAM(S): 450 TABLET, FILM COATED ORAL at 11:45

## 2022-01-01 RX ADMIN — Medication 400 MILLIGRAM(S): at 07:42

## 2022-01-01 RX ADMIN — SENNA PLUS 2 TABLET(S): 8.6 TABLET ORAL at 20:07

## 2022-01-01 RX ADMIN — POLYETHYLENE GLYCOL 3350 17 GRAM(S): 17 POWDER, FOR SOLUTION ORAL at 18:13

## 2022-01-01 RX ADMIN — Medication 1300 MILLIGRAM(S): at 08:45

## 2022-01-01 RX ADMIN — Medication 50 MICROGRAM(S): at 06:43

## 2022-01-01 RX ADMIN — Medication 975 MILLIGRAM(S): at 16:36

## 2022-01-01 RX ADMIN — Medication 50 MILLIEQUIVALENT(S): at 20:21

## 2022-01-01 RX ADMIN — Medication 1: at 09:06

## 2022-01-01 RX ADMIN — Medication 8 UNIT(S): at 09:13

## 2022-01-01 RX ADMIN — Medication 20 MILLIGRAM(S): at 05:01

## 2022-01-01 RX ADMIN — BRIVARACETAM 50 MILLIGRAM(S): 25 TABLET, FILM COATED ORAL at 05:29

## 2022-01-01 RX ADMIN — Medication 10 MILLIGRAM(S): at 05:32

## 2022-01-01 RX ADMIN — Medication 1 APPLICATION(S): at 05:25

## 2022-01-01 RX ADMIN — FLUCONAZOLE 200 MILLIGRAM(S): 150 TABLET ORAL at 11:22

## 2022-01-01 RX ADMIN — Medication 100 MILLIGRAM(S): at 21:33

## 2022-01-01 RX ADMIN — ATOVAQUONE 1500 MILLIGRAM(S): 750 SUSPENSION ORAL at 11:38

## 2022-01-01 RX ADMIN — Medication 6 MILLIGRAM(S): at 21:53

## 2022-01-01 RX ADMIN — Medication 500000 UNIT(S): at 00:24

## 2022-01-01 RX ADMIN — Medication 5 MILLIGRAM(S): at 08:06

## 2022-01-01 RX ADMIN — MYCOPHENOLATE MOFETIL 1000 MILLIGRAM(S): 250 CAPSULE ORAL at 20:40

## 2022-01-01 RX ADMIN — Medication 40 MICROGRAM(S): at 21:44

## 2022-01-01 RX ADMIN — Medication 1300 MILLIGRAM(S): at 13:26

## 2022-01-01 RX ADMIN — PANTOPRAZOLE SODIUM 40 MILLIGRAM(S): 20 TABLET, DELAYED RELEASE ORAL at 06:08

## 2022-01-01 RX ADMIN — CHLORHEXIDINE GLUCONATE 1 APPLICATION(S): 213 SOLUTION TOPICAL at 12:00

## 2022-01-01 RX ADMIN — Medication 1: at 12:06

## 2022-01-01 RX ADMIN — Medication 10 MILLIGRAM(S): at 06:29

## 2022-01-01 RX ADMIN — Medication 50 MILLIGRAM(S): at 13:03

## 2022-01-01 RX ADMIN — Medication 500000 UNIT(S): at 22:02

## 2022-01-01 RX ADMIN — Medication 2 DROP(S): at 11:10

## 2022-01-01 RX ADMIN — Medication 80 MILLIGRAM(S): at 20:36

## 2022-01-01 RX ADMIN — SIROLIMUS 7 MILLIGRAM(S): 1 SOLUTION ORAL at 08:00

## 2022-01-01 RX ADMIN — MONTELUKAST 10 MILLIGRAM(S): 4 TABLET, CHEWABLE ORAL at 12:20

## 2022-01-01 RX ADMIN — POLYETHYLENE GLYCOL 3350 17 GRAM(S): 17 POWDER, FOR SOLUTION ORAL at 11:46

## 2022-01-01 RX ADMIN — Medication 200 MILLIGRAM(S): at 05:15

## 2022-01-01 RX ADMIN — Medication 650 MILLIGRAM(S): at 06:40

## 2022-01-01 RX ADMIN — INSULIN GLARGINE 16 UNIT(S): 100 INJECTION, SOLUTION SUBCUTANEOUS at 23:25

## 2022-01-01 RX ADMIN — FINASTERIDE 5 MILLIGRAM(S): 5 TABLET, FILM COATED ORAL at 13:41

## 2022-01-01 RX ADMIN — MYCOPHENOLATE MOFETIL 500 MILLIGRAM(S): 250 CAPSULE ORAL at 21:34

## 2022-01-01 RX ADMIN — Medication 200 MILLIGRAM(S): at 17:43

## 2022-01-01 RX ADMIN — Medication 50 MICROGRAM(S): at 05:10

## 2022-01-01 RX ADMIN — Medication 2: at 00:40

## 2022-01-01 RX ADMIN — BELATACEPT 200 MILLIGRAM(S): 250 INJECTION, POWDER, LYOPHILIZED, FOR SOLUTION INTRAVENOUS at 17:08

## 2022-01-01 RX ADMIN — INSULIN GLARGINE 8 UNIT(S): 100 INJECTION, SOLUTION SUBCUTANEOUS at 22:14

## 2022-01-01 RX ADMIN — Medication 15 MILLILITER(S): at 11:38

## 2022-01-01 RX ADMIN — Medication 3 UNIT(S): at 18:54

## 2022-01-01 RX ADMIN — MEROPENEM 100 MILLIGRAM(S): 1 INJECTION INTRAVENOUS at 17:45

## 2022-01-01 RX ADMIN — CHLORHEXIDINE GLUCONATE 1 APPLICATION(S): 213 SOLUTION TOPICAL at 14:41

## 2022-01-01 RX ADMIN — Medication 60 MILLIGRAM(S): at 06:05

## 2022-01-01 RX ADMIN — Medication 2: at 21:32

## 2022-01-01 RX ADMIN — Medication 50 MICROGRAM(S): at 05:14

## 2022-01-01 RX ADMIN — INSULIN GLARGINE 7 UNIT(S): 100 INJECTION, SOLUTION SUBCUTANEOUS at 22:00

## 2022-01-01 RX ADMIN — CHLORHEXIDINE GLUCONATE 1 APPLICATION(S): 213 SOLUTION TOPICAL at 20:39

## 2022-01-01 RX ADMIN — Medication 2: at 11:48

## 2022-01-01 RX ADMIN — Medication 4 MILLIGRAM(S): at 05:04

## 2022-01-01 RX ADMIN — Medication 2 DROP(S): at 12:53

## 2022-01-01 RX ADMIN — Medication 1: at 05:54

## 2022-01-01 RX ADMIN — POLYETHYLENE GLYCOL 3350 17 GRAM(S): 17 POWDER, FOR SOLUTION ORAL at 13:18

## 2022-01-01 RX ADMIN — Medication 25 MILLILITER(S): at 19:52

## 2022-01-01 RX ADMIN — Medication 500000 UNIT(S): at 12:49

## 2022-01-01 RX ADMIN — Medication 2: at 17:49

## 2022-01-01 RX ADMIN — Medication 37.5 MICROGRAM(S): at 22:31

## 2022-01-01 RX ADMIN — POLYETHYLENE GLYCOL 3350 17 GRAM(S): 17 POWDER, FOR SOLUTION ORAL at 12:32

## 2022-01-01 RX ADMIN — Medication 4 MILLIGRAM(S): at 05:22

## 2022-01-01 RX ADMIN — CARVEDILOL PHOSPHATE 12.5 MILLIGRAM(S): 80 CAPSULE, EXTENDED RELEASE ORAL at 05:55

## 2022-01-01 RX ADMIN — Medication 4 UNIT(S): at 08:34

## 2022-01-01 RX ADMIN — Medication 1 APPLICATION(S): at 07:45

## 2022-01-01 RX ADMIN — INSULIN GLARGINE 7 UNIT(S): 100 INJECTION, SOLUTION SUBCUTANEOUS at 22:15

## 2022-01-01 RX ADMIN — Medication 650 MILLIGRAM(S): at 22:35

## 2022-01-01 RX ADMIN — CHLORHEXIDINE GLUCONATE 1 APPLICATION(S): 213 SOLUTION TOPICAL at 05:56

## 2022-01-01 RX ADMIN — Medication 2: at 11:23

## 2022-01-01 RX ADMIN — MAGNESIUM OXIDE 400 MG ORAL TABLET 400 MILLIGRAM(S): 241.3 TABLET ORAL at 14:07

## 2022-01-01 RX ADMIN — Medication 25 GRAM(S): at 18:03

## 2022-01-01 RX ADMIN — CARVEDILOL PHOSPHATE 12.5 MILLIGRAM(S): 80 CAPSULE, EXTENDED RELEASE ORAL at 20:50

## 2022-01-01 RX ADMIN — Medication 6 MILLIGRAM(S): at 21:29

## 2022-01-01 RX ADMIN — SEVELAMER CARBONATE 800 MILLIGRAM(S): 2400 POWDER, FOR SUSPENSION ORAL at 18:18

## 2022-01-01 RX ADMIN — Medication 4 UNIT(S): at 18:04

## 2022-01-01 RX ADMIN — CARVEDILOL PHOSPHATE 3.12 MILLIGRAM(S): 80 CAPSULE, EXTENDED RELEASE ORAL at 18:11

## 2022-01-01 RX ADMIN — Medication 6 MILLIGRAM(S): at 21:42

## 2022-01-01 RX ADMIN — Medication 500000 UNIT(S): at 22:38

## 2022-01-01 RX ADMIN — AMLODIPINE BESYLATE 10 MILLIGRAM(S): 2.5 TABLET ORAL at 06:00

## 2022-01-01 RX ADMIN — SIROLIMUS 0.5 MILLIGRAM(S): 1 SOLUTION ORAL at 18:49

## 2022-01-01 RX ADMIN — Medication 3 UNIT(S): at 17:31

## 2022-01-01 RX ADMIN — HYDROMORPHONE HYDROCHLORIDE 0.5 MILLIGRAM(S): 2 INJECTION INTRAMUSCULAR; INTRAVENOUS; SUBCUTANEOUS at 04:06

## 2022-01-01 RX ADMIN — Medication 40 MICROGRAM(S): at 22:15

## 2022-01-01 RX ADMIN — Medication 5 MILLIGRAM(S): at 06:13

## 2022-01-01 RX ADMIN — SIROLIMUS 0.5 MILLIGRAM(S): 1 SOLUTION ORAL at 15:35

## 2022-01-01 RX ADMIN — ATOVAQUONE 1500 MILLIGRAM(S): 750 SUSPENSION ORAL at 08:28

## 2022-01-01 RX ADMIN — Medication 80 MILLIGRAM(S): at 06:02

## 2022-01-01 RX ADMIN — Medication 60 MILLIGRAM(S): at 05:13

## 2022-01-01 RX ADMIN — ERYTHROPOIETIN 14000 UNIT(S): 10000 INJECTION, SOLUTION INTRAVENOUS; SUBCUTANEOUS at 16:26

## 2022-01-01 RX ADMIN — CHLORHEXIDINE GLUCONATE 1 APPLICATION(S): 213 SOLUTION TOPICAL at 08:09

## 2022-01-01 RX ADMIN — Medication 6 MILLIGRAM(S): at 22:16

## 2022-01-01 RX ADMIN — Medication 1: at 21:29

## 2022-01-01 RX ADMIN — Medication 500000 UNIT(S): at 11:25

## 2022-01-01 RX ADMIN — Medication 1000 MILLIGRAM(S): at 18:00

## 2022-01-01 RX ADMIN — INSULIN GLARGINE 5 UNIT(S): 100 INJECTION, SOLUTION SUBCUTANEOUS at 22:00

## 2022-01-01 RX ADMIN — Medication 1 TABLET(S): at 12:54

## 2022-01-01 RX ADMIN — MAGNESIUM OXIDE 400 MG ORAL TABLET 400 MILLIGRAM(S): 241.3 TABLET ORAL at 22:19

## 2022-01-01 RX ADMIN — Medication 10 MILLIGRAM(S): at 06:19

## 2022-01-01 RX ADMIN — Medication 500000 UNIT(S): at 13:05

## 2022-01-01 RX ADMIN — Medication 5 UNIT(S): at 12:00

## 2022-01-01 RX ADMIN — Medication 50 MICROGRAM(S): at 06:47

## 2022-01-01 RX ADMIN — Medication 500000 UNIT(S): at 18:27

## 2022-01-01 RX ADMIN — MYCOPHENOLATE MOFETIL 500 MILLIGRAM(S): 250 CAPSULE ORAL at 19:28

## 2022-01-01 RX ADMIN — FOSPHENYTOIN 104 MILLIGRAM(S) PE: 50 INJECTION INTRAMUSCULAR; INTRAVENOUS at 05:25

## 2022-01-01 RX ADMIN — POLYETHYLENE GLYCOL 3350 17 GRAM(S): 17 POWDER, FOR SOLUTION ORAL at 11:23

## 2022-01-01 RX ADMIN — ATOVAQUONE 1500 MILLIGRAM(S): 750 SUSPENSION ORAL at 08:39

## 2022-01-01 RX ADMIN — Medication 4 UNIT(S): at 17:01

## 2022-01-01 RX ADMIN — Medication 6 MILLIGRAM(S): at 21:39

## 2022-01-01 RX ADMIN — Medication 3: at 13:18

## 2022-01-01 RX ADMIN — Medication 200 MILLIGRAM(S): at 05:40

## 2022-01-01 RX ADMIN — Medication 100 MILLIGRAM(S): at 20:35

## 2022-01-01 RX ADMIN — Medication 500000 UNIT(S): at 05:26

## 2022-01-01 RX ADMIN — Medication 4 MILLIGRAM(S): at 06:19

## 2022-01-01 RX ADMIN — AMLODIPINE BESYLATE 5 MILLIGRAM(S): 2.5 TABLET ORAL at 05:08

## 2022-01-01 RX ADMIN — HEPARIN SODIUM 5000 UNIT(S): 5000 INJECTION INTRAVENOUS; SUBCUTANEOUS at 13:25

## 2022-01-01 RX ADMIN — Medication 200 MILLIGRAM(S): at 17:15

## 2022-01-01 RX ADMIN — Medication 5 MILLIGRAM(S): at 06:04

## 2022-01-01 RX ADMIN — Medication 50 MILLIGRAM(S): at 14:38

## 2022-01-01 RX ADMIN — FOSPHENYTOIN 103 MILLIGRAM(S) PE: 50 INJECTION INTRAMUSCULAR; INTRAVENOUS at 23:23

## 2022-01-01 RX ADMIN — Medication 9 UNIT(S): at 13:22

## 2022-01-01 RX ADMIN — Medication 500000 UNIT(S): at 12:45

## 2022-01-01 RX ADMIN — OXYCODONE HYDROCHLORIDE 5 MILLIGRAM(S): 5 TABLET ORAL at 01:44

## 2022-01-01 RX ADMIN — INSULIN GLARGINE 4 UNIT(S): 100 INJECTION, SOLUTION SUBCUTANEOUS at 21:36

## 2022-01-01 RX ADMIN — Medication 500000 UNIT(S): at 06:11

## 2022-01-01 RX ADMIN — SEVELAMER CARBONATE 800 MILLIGRAM(S): 2400 POWDER, FOR SUSPENSION ORAL at 07:56

## 2022-01-01 RX ADMIN — PROPOFOL 3.7 MICROGRAM(S)/KG/MIN: 10 INJECTION, EMULSION INTRAVENOUS at 07:17

## 2022-01-01 RX ADMIN — Medication 200 MILLIGRAM(S): at 18:51

## 2022-01-01 RX ADMIN — LEVETIRACETAM 400 MILLIGRAM(S): 250 TABLET, FILM COATED ORAL at 18:23

## 2022-01-01 RX ADMIN — Medication 1 TABLET(S): at 14:23

## 2022-01-01 RX ADMIN — Medication 3 UNIT(S): at 14:29

## 2022-01-01 RX ADMIN — Medication 1000 MILLIGRAM(S): at 13:22

## 2022-01-01 RX ADMIN — POLYETHYLENE GLYCOL 3350 17 GRAM(S): 17 POWDER, FOR SOLUTION ORAL at 11:52

## 2022-01-01 RX ADMIN — Medication 50 MICROGRAM(S): at 05:54

## 2022-01-01 RX ADMIN — Medication 500 MILLIGRAM(S): at 06:12

## 2022-01-01 RX ADMIN — Medication 10 MILLIGRAM(S): at 02:39

## 2022-01-01 RX ADMIN — FOSPHENYTOIN 108 MILLIGRAM(S) PE: 50 INJECTION INTRAMUSCULAR; INTRAVENOUS at 17:31

## 2022-01-01 RX ADMIN — Medication 4 MILLIGRAM(S): at 17:13

## 2022-01-01 RX ADMIN — PANTOPRAZOLE SODIUM 40 MILLIGRAM(S): 20 TABLET, DELAYED RELEASE ORAL at 11:12

## 2022-01-01 RX ADMIN — Medication 100 MILLIGRAM(S): at 06:11

## 2022-01-01 RX ADMIN — Medication 10 MILLIGRAM(S): at 05:23

## 2022-01-01 RX ADMIN — Medication 1000 MILLIGRAM(S): at 03:45

## 2022-01-01 RX ADMIN — Medication 6 MILLIGRAM(S): at 22:30

## 2022-01-01 RX ADMIN — SIROLIMUS 2.5 MILLIGRAM(S): 1 SOLUTION ORAL at 07:46

## 2022-01-01 RX ADMIN — HYDROMORPHONE HYDROCHLORIDE 0.25 MILLIGRAM(S): 2 INJECTION INTRAMUSCULAR; INTRAVENOUS; SUBCUTANEOUS at 22:34

## 2022-01-01 RX ADMIN — ERYTHROPOIETIN 10000 UNIT(S): 10000 INJECTION, SOLUTION INTRAVENOUS; SUBCUTANEOUS at 09:48

## 2022-01-01 RX ADMIN — LEVETIRACETAM 250 MILLIGRAM(S): 250 TABLET, FILM COATED ORAL at 05:28

## 2022-01-01 RX ADMIN — INSULIN GLARGINE 5 UNIT(S): 100 INJECTION, SOLUTION SUBCUTANEOUS at 21:10

## 2022-01-01 RX ADMIN — Medication 500000 UNIT(S): at 13:22

## 2022-01-01 RX ADMIN — CHLORHEXIDINE GLUCONATE 1 APPLICATION(S): 213 SOLUTION TOPICAL at 05:23

## 2022-01-01 RX ADMIN — Medication 500000 UNIT(S): at 22:04

## 2022-01-01 RX ADMIN — SODIUM ZIRCONIUM CYCLOSILICATE 10 GRAM(S): 10 POWDER, FOR SUSPENSION ORAL at 05:21

## 2022-01-01 RX ADMIN — Medication 50 MILLIGRAM(S): at 05:02

## 2022-01-01 RX ADMIN — MYCOPHENOLATE MOFETIL 500 MILLIGRAM(S): 250 CAPSULE ORAL at 20:13

## 2022-01-01 RX ADMIN — CHLORHEXIDINE GLUCONATE 1 APPLICATION(S): 213 SOLUTION TOPICAL at 06:17

## 2022-01-01 RX ADMIN — Medication 50 MILLIGRAM(S): at 21:07

## 2022-01-01 RX ADMIN — Medication 4: at 13:11

## 2022-01-01 RX ADMIN — Medication 25 MILLILITER(S): at 08:38

## 2022-01-01 RX ADMIN — Medication 5 MILLIGRAM(S): at 05:13

## 2022-01-01 RX ADMIN — BRIVARACETAM 50 MILLIGRAM(S): 25 TABLET, FILM COATED ORAL at 08:11

## 2022-01-01 RX ADMIN — HEPARIN SODIUM 5000 UNIT(S): 5000 INJECTION INTRAVENOUS; SUBCUTANEOUS at 05:32

## 2022-01-01 RX ADMIN — AMLODIPINE BESYLATE 10 MILLIGRAM(S): 2.5 TABLET ORAL at 05:03

## 2022-01-01 RX ADMIN — TACROLIMUS 2 MILLIGRAM(S): 5 CAPSULE ORAL at 20:00

## 2022-01-01 RX ADMIN — Medication 500000 UNIT(S): at 23:42

## 2022-01-01 RX ADMIN — Medication 2 DROP(S): at 17:14

## 2022-01-01 RX ADMIN — PANTOPRAZOLE SODIUM 40 MILLIGRAM(S): 20 TABLET, DELAYED RELEASE ORAL at 12:15

## 2022-01-01 RX ADMIN — CHLORHEXIDINE GLUCONATE 1 APPLICATION(S): 213 SOLUTION TOPICAL at 12:05

## 2022-01-01 RX ADMIN — Medication 400 MILLIGRAM(S): at 03:28

## 2022-01-01 RX ADMIN — Medication 6 MILLIGRAM(S): at 22:05

## 2022-01-01 RX ADMIN — Medication 1300 MILLIGRAM(S): at 17:06

## 2022-01-01 RX ADMIN — Medication 6 MILLIGRAM(S): at 21:33

## 2022-01-01 RX ADMIN — CARVEDILOL PHOSPHATE 6.25 MILLIGRAM(S): 80 CAPSULE, EXTENDED RELEASE ORAL at 18:09

## 2022-01-01 RX ADMIN — Medication 2: at 18:06

## 2022-01-01 RX ADMIN — POLYETHYLENE GLYCOL 3350 17 GRAM(S): 17 POWDER, FOR SOLUTION ORAL at 13:22

## 2022-01-01 RX ADMIN — SEVELAMER CARBONATE 800 MILLIGRAM(S): 2400 POWDER, FOR SUSPENSION ORAL at 08:00

## 2022-01-01 RX ADMIN — Medication 2: at 17:02

## 2022-01-01 RX ADMIN — ATORVASTATIN CALCIUM 80 MILLIGRAM(S): 80 TABLET, FILM COATED ORAL at 22:06

## 2022-01-01 RX ADMIN — Medication 6 MILLIGRAM(S): at 21:15

## 2022-01-01 RX ADMIN — LEVETIRACETAM 400 MILLIGRAM(S): 250 TABLET, FILM COATED ORAL at 12:18

## 2022-01-01 RX ADMIN — MYCOPHENOLATE MOFETIL 250 MILLIGRAM(S): 250 CAPSULE ORAL at 08:37

## 2022-01-01 RX ADMIN — CARVEDILOL PHOSPHATE 3.12 MILLIGRAM(S): 80 CAPSULE, EXTENDED RELEASE ORAL at 18:03

## 2022-01-01 RX ADMIN — BRIVARACETAM 50 MILLIGRAM(S): 25 TABLET, FILM COATED ORAL at 18:36

## 2022-01-01 RX ADMIN — FINASTERIDE 5 MILLIGRAM(S): 5 TABLET, FILM COATED ORAL at 11:36

## 2022-01-01 RX ADMIN — Medication 50 MICROGRAM(S): at 06:02

## 2022-01-01 RX ADMIN — POLYETHYLENE GLYCOL 3350 17 GRAM(S): 17 POWDER, FOR SOLUTION ORAL at 12:06

## 2022-01-01 RX ADMIN — ATORVASTATIN CALCIUM 80 MILLIGRAM(S): 80 TABLET, FILM COATED ORAL at 21:13

## 2022-01-01 RX ADMIN — PANTOPRAZOLE SODIUM 40 MILLIGRAM(S): 20 TABLET, DELAYED RELEASE ORAL at 12:00

## 2022-01-01 RX ADMIN — OXYCODONE HYDROCHLORIDE 5 MILLIGRAM(S): 5 TABLET ORAL at 02:00

## 2022-01-01 RX ADMIN — TRAMADOL HYDROCHLORIDE 25 MILLIGRAM(S): 50 TABLET ORAL at 09:00

## 2022-01-01 RX ADMIN — Medication 6 MILLIGRAM(S): at 21:24

## 2022-01-01 RX ADMIN — FINASTERIDE 5 MILLIGRAM(S): 5 TABLET, FILM COATED ORAL at 12:04

## 2022-01-01 RX ADMIN — CARVEDILOL PHOSPHATE 25 MILLIGRAM(S): 80 CAPSULE, EXTENDED RELEASE ORAL at 05:06

## 2022-01-01 RX ADMIN — DIVALPROEX SODIUM 500 MILLIGRAM(S): 500 TABLET, DELAYED RELEASE ORAL at 20:03

## 2022-01-01 RX ADMIN — LEVETIRACETAM 250 MILLIGRAM(S): 250 TABLET, FILM COATED ORAL at 18:09

## 2022-01-01 RX ADMIN — Medication 50 MICROGRAM(S): at 05:59

## 2022-01-01 RX ADMIN — FOSPHENYTOIN 108 MILLIGRAM(S) PE: 50 INJECTION INTRAMUSCULAR; INTRAVENOUS at 10:46

## 2022-01-01 RX ADMIN — MYCOPHENOLATE MOFETIL 1000 MILLIGRAM(S): 250 CAPSULE ORAL at 07:54

## 2022-01-01 RX ADMIN — CHLORHEXIDINE GLUCONATE 15 MILLILITER(S): 213 SOLUTION TOPICAL at 18:29

## 2022-01-01 RX ADMIN — Medication 5 MILLIGRAM(S): at 06:00

## 2022-01-01 RX ADMIN — Medication 25 MILLIGRAM(S): at 23:38

## 2022-01-01 RX ADMIN — Medication 200 MILLIGRAM(S): at 18:25

## 2022-01-01 RX ADMIN — MYCOPHENOLATE MOFETIL 250 MILLIGRAM(S): 250 CAPSULE ORAL at 20:40

## 2022-01-01 RX ADMIN — INSULIN GLARGINE 5 UNIT(S): 100 INJECTION, SOLUTION SUBCUTANEOUS at 21:08

## 2022-01-01 RX ADMIN — Medication 4 MILLIGRAM(S): at 17:06

## 2022-01-01 RX ADMIN — CHLORHEXIDINE GLUCONATE 1 APPLICATION(S): 213 SOLUTION TOPICAL at 08:11

## 2022-01-01 RX ADMIN — Medication 1300 MILLIGRAM(S): at 17:27

## 2022-01-01 RX ADMIN — TACROLIMUS 0.5 MILLIGRAM(S): 5 CAPSULE ORAL at 10:24

## 2022-01-01 RX ADMIN — LEVETIRACETAM 250 MILLIGRAM(S): 250 TABLET, FILM COATED ORAL at 17:03

## 2022-01-01 RX ADMIN — TACROLIMUS 0.5 MILLIGRAM(S): 5 CAPSULE ORAL at 07:48

## 2022-01-01 RX ADMIN — Medication 975 MILLIGRAM(S): at 23:48

## 2022-01-01 RX ADMIN — Medication 200 MILLIGRAM(S): at 17:53

## 2022-01-01 RX ADMIN — Medication 50 MICROGRAM(S): at 06:40

## 2022-01-01 RX ADMIN — SEVELAMER CARBONATE 800 MILLIGRAM(S): 2400 POWDER, FOR SUSPENSION ORAL at 17:39

## 2022-01-01 RX ADMIN — TRAMADOL HYDROCHLORIDE 25 MILLIGRAM(S): 50 TABLET ORAL at 02:00

## 2022-01-01 RX ADMIN — MYCOPHENOLATE MOFETIL 500 MILLIGRAM(S): 250 CAPSULE ORAL at 10:15

## 2022-01-01 RX ADMIN — Medication 5 UNIT(S): at 11:39

## 2022-01-01 RX ADMIN — Medication 100 MILLIGRAM(S): at 12:22

## 2022-01-01 RX ADMIN — LEVETIRACETAM 250 MILLIGRAM(S): 250 TABLET, FILM COATED ORAL at 18:25

## 2022-01-01 RX ADMIN — Medication 3: at 13:24

## 2022-01-01 RX ADMIN — FOSPHENYTOIN 104 MILLIGRAM(S) PE: 50 INJECTION INTRAMUSCULAR; INTRAVENOUS at 22:09

## 2022-01-01 RX ADMIN — Medication 500000 UNIT(S): at 05:09

## 2022-01-01 RX ADMIN — SODIUM ZIRCONIUM CYCLOSILICATE 10 GRAM(S): 10 POWDER, FOR SUSPENSION ORAL at 11:19

## 2022-01-01 RX ADMIN — ATORVASTATIN CALCIUM 40 MILLIGRAM(S): 80 TABLET, FILM COATED ORAL at 22:58

## 2022-01-01 RX ADMIN — Medication 1 GRAM(S): at 05:55

## 2022-01-01 RX ADMIN — Medication 4 MILLIGRAM(S): at 05:29

## 2022-01-01 RX ADMIN — Medication 1: at 23:35

## 2022-01-01 RX ADMIN — Medication 50 MICROGRAM(S): at 06:18

## 2022-01-01 RX ADMIN — PANTOPRAZOLE SODIUM 40 MILLIGRAM(S): 20 TABLET, DELAYED RELEASE ORAL at 09:30

## 2022-01-01 RX ADMIN — FLUCONAZOLE 200 MILLIGRAM(S): 150 TABLET ORAL at 12:06

## 2022-01-01 RX ADMIN — INSULIN GLARGINE 10 UNIT(S): 100 INJECTION, SOLUTION SUBCUTANEOUS at 21:35

## 2022-01-01 RX ADMIN — SEVELAMER CARBONATE 800 MILLIGRAM(S): 2400 POWDER, FOR SUSPENSION ORAL at 08:01

## 2022-01-01 RX ADMIN — Medication 500000 UNIT(S): at 05:14

## 2022-01-01 RX ADMIN — CARVEDILOL PHOSPHATE 12.5 MILLIGRAM(S): 80 CAPSULE, EXTENDED RELEASE ORAL at 17:43

## 2022-01-01 RX ADMIN — Medication 2: at 13:33

## 2022-01-01 RX ADMIN — Medication 25 MILLIGRAM(S): at 21:51

## 2022-01-01 RX ADMIN — FOSPHENYTOIN 104 MILLIGRAM(S) PE: 50 INJECTION INTRAMUSCULAR; INTRAVENOUS at 17:51

## 2022-01-01 RX ADMIN — Medication 6: at 18:04

## 2022-01-01 RX ADMIN — PANTOPRAZOLE SODIUM 40 MILLIGRAM(S): 20 TABLET, DELAYED RELEASE ORAL at 05:56

## 2022-01-01 RX ADMIN — Medication 1000 MILLIGRAM(S): at 12:52

## 2022-01-01 RX ADMIN — MYCOPHENOLATE MOFETIL 1 GRAM(S): 250 CAPSULE ORAL at 07:49

## 2022-01-01 RX ADMIN — SIROLIMUS 6 MILLIGRAM(S): 1 SOLUTION ORAL at 08:05

## 2022-01-01 RX ADMIN — CARVEDILOL PHOSPHATE 6.25 MILLIGRAM(S): 80 CAPSULE, EXTENDED RELEASE ORAL at 06:22

## 2022-01-01 RX ADMIN — FINASTERIDE 5 MILLIGRAM(S): 5 TABLET, FILM COATED ORAL at 11:40

## 2022-01-01 RX ADMIN — Medication 7 UNIT(S): at 08:48

## 2022-01-01 RX ADMIN — Medication 1: at 13:22

## 2022-01-01 RX ADMIN — CARVEDILOL PHOSPHATE 3.12 MILLIGRAM(S): 80 CAPSULE, EXTENDED RELEASE ORAL at 21:12

## 2022-01-01 RX ADMIN — Medication 100 MILLIGRAM(S): at 20:55

## 2022-01-01 RX ADMIN — EVEROLIMUS 0.5 MILLIGRAM(S): 10 TABLET ORAL at 17:41

## 2022-01-01 RX ADMIN — Medication 100 MILLIGRAM(S): at 13:11

## 2022-01-01 RX ADMIN — Medication 650 MILLIGRAM(S): at 22:37

## 2022-01-01 RX ADMIN — Medication 2: at 17:25

## 2022-01-01 RX ADMIN — Medication 500000 UNIT(S): at 17:07

## 2022-01-01 RX ADMIN — MYCOPHENOLATE MOFETIL 1000 MILLIGRAM(S): 250 CAPSULE ORAL at 09:43

## 2022-01-01 RX ADMIN — Medication 4 MILLIGRAM(S): at 06:14

## 2022-01-01 RX ADMIN — FOSPHENYTOIN 104 MILLIGRAM(S) PE: 50 INJECTION INTRAMUSCULAR; INTRAVENOUS at 16:11

## 2022-01-01 RX ADMIN — Medication 100 MILLIGRAM(S): at 05:25

## 2022-01-01 RX ADMIN — DIVALPROEX SODIUM 500 MILLIGRAM(S): 500 TABLET, DELAYED RELEASE ORAL at 09:51

## 2022-01-01 RX ADMIN — Medication 6: at 13:13

## 2022-01-01 RX ADMIN — HYDROMORPHONE HYDROCHLORIDE 0.5 MILLIGRAM(S): 2 INJECTION INTRAMUSCULAR; INTRAVENOUS; SUBCUTANEOUS at 14:25

## 2022-01-01 RX ADMIN — Medication 650 MILLIGRAM(S): at 23:10

## 2022-01-01 RX ADMIN — PANTOPRAZOLE SODIUM 40 MILLIGRAM(S): 20 TABLET, DELAYED RELEASE ORAL at 12:08

## 2022-01-01 RX ADMIN — FINASTERIDE 5 MILLIGRAM(S): 5 TABLET, FILM COATED ORAL at 12:07

## 2022-01-01 RX ADMIN — Medication 1: at 11:55

## 2022-01-01 RX ADMIN — Medication 2 DROP(S): at 11:28

## 2022-01-01 RX ADMIN — Medication 500 MILLIGRAM(S): at 05:24

## 2022-01-01 RX ADMIN — Medication 4 MILLIGRAM(S): at 05:59

## 2022-01-01 RX ADMIN — Medication 5 MILLIGRAM(S): at 05:56

## 2022-01-01 RX ADMIN — Medication 650 MILLIGRAM(S): at 05:26

## 2022-01-01 RX ADMIN — PANTOPRAZOLE SODIUM 40 MILLIGRAM(S): 20 TABLET, DELAYED RELEASE ORAL at 10:48

## 2022-01-01 RX ADMIN — HYDROMORPHONE HYDROCHLORIDE 0.5 MILLIGRAM(S): 2 INJECTION INTRAMUSCULAR; INTRAVENOUS; SUBCUTANEOUS at 16:12

## 2022-01-01 RX ADMIN — SODIUM ZIRCONIUM CYCLOSILICATE 10 GRAM(S): 10 POWDER, FOR SUSPENSION ORAL at 21:21

## 2022-01-01 RX ADMIN — ERYTHROPOIETIN 10000 UNIT(S): 10000 INJECTION, SOLUTION INTRAVENOUS; SUBCUTANEOUS at 16:39

## 2022-01-01 RX ADMIN — Medication 650 MILLIGRAM(S): at 02:08

## 2022-01-01 RX ADMIN — HEPARIN SODIUM 5000 UNIT(S): 5000 INJECTION INTRAVENOUS; SUBCUTANEOUS at 05:21

## 2022-01-01 RX ADMIN — Medication 4 MILLIGRAM(S): at 05:34

## 2022-01-01 RX ADMIN — Medication 25 MILLIGRAM(S): at 13:42

## 2022-01-01 RX ADMIN — Medication 2 DROP(S): at 18:56

## 2022-01-01 RX ADMIN — FOSPHENYTOIN 108 MILLIGRAM(S) PE: 50 INJECTION INTRAMUSCULAR; INTRAVENOUS at 22:00

## 2022-01-01 RX ADMIN — Medication 80 MILLIGRAM(S): at 06:24

## 2022-01-01 RX ADMIN — Medication 10 MILLIGRAM(S): at 15:45

## 2022-01-01 RX ADMIN — ERTAPENEM SODIUM 120 MILLIGRAM(S): 1 INJECTION, POWDER, LYOPHILIZED, FOR SOLUTION INTRAMUSCULAR; INTRAVENOUS at 17:51

## 2022-01-01 RX ADMIN — Medication 4 MILLIGRAM(S): at 17:15

## 2022-01-01 RX ADMIN — Medication 2 DROP(S): at 13:21

## 2022-01-01 RX ADMIN — Medication 5 UNIT(S): at 07:56

## 2022-01-01 RX ADMIN — SEVELAMER CARBONATE 800 MILLIGRAM(S): 2400 POWDER, FOR SUSPENSION ORAL at 08:31

## 2022-01-01 RX ADMIN — POLYETHYLENE GLYCOL 3350 17 GRAM(S): 17 POWDER, FOR SOLUTION ORAL at 17:01

## 2022-01-01 RX ADMIN — Medication 1: at 04:14

## 2022-01-01 RX ADMIN — APIXABAN 5 MILLIGRAM(S): 2.5 TABLET, FILM COATED ORAL at 17:28

## 2022-01-01 RX ADMIN — PANTOPRAZOLE SODIUM 40 MILLIGRAM(S): 20 TABLET, DELAYED RELEASE ORAL at 05:54

## 2022-01-01 RX ADMIN — INSULIN GLARGINE 5 UNIT(S): 100 INJECTION, SOLUTION SUBCUTANEOUS at 21:20

## 2022-01-01 RX ADMIN — Medication 10 MILLIGRAM(S): at 03:42

## 2022-01-01 RX ADMIN — Medication 15 MILLILITER(S): at 12:18

## 2022-01-01 RX ADMIN — FLUCONAZOLE 200 MILLIGRAM(S): 150 TABLET ORAL at 11:02

## 2022-01-01 RX ADMIN — SENNA PLUS 2 TABLET(S): 8.6 TABLET ORAL at 22:27

## 2022-01-01 RX ADMIN — DIVALPROEX SODIUM 500 MILLIGRAM(S): 500 TABLET, DELAYED RELEASE ORAL at 21:54

## 2022-01-01 RX ADMIN — Medication 4 UNIT(S): at 13:18

## 2022-01-01 RX ADMIN — MEROPENEM 100 MILLIGRAM(S): 1 INJECTION INTRAVENOUS at 17:07

## 2022-01-01 RX ADMIN — Medication 6: at 23:33

## 2022-01-01 RX ADMIN — Medication 200 MILLIGRAM(S): at 05:43

## 2022-01-01 RX ADMIN — HYDROMORPHONE HYDROCHLORIDE 0.5 MILLIGRAM(S): 2 INJECTION INTRAMUSCULAR; INTRAVENOUS; SUBCUTANEOUS at 16:45

## 2022-01-01 RX ADMIN — Medication 60 MILLIGRAM(S): at 05:20

## 2022-01-01 RX ADMIN — Medication 500000 UNIT(S): at 22:56

## 2022-01-01 RX ADMIN — Medication 4 MILLIGRAM(S): at 17:35

## 2022-01-01 RX ADMIN — PANTOPRAZOLE SODIUM 40 MILLIGRAM(S): 20 TABLET, DELAYED RELEASE ORAL at 13:35

## 2022-01-01 RX ADMIN — Medication 20 MILLIGRAM(S): at 10:31

## 2022-01-01 RX ADMIN — BRIVARACETAM 50 MILLIGRAM(S): 25 TABLET, FILM COATED ORAL at 20:16

## 2022-01-01 RX ADMIN — Medication 1 APPLICATION(S): at 17:26

## 2022-01-01 RX ADMIN — ERYTHROPOIETIN 10000 UNIT(S): 10000 INJECTION, SOLUTION INTRAVENOUS; SUBCUTANEOUS at 15:12

## 2022-01-01 RX ADMIN — Medication 500000 UNIT(S): at 11:40

## 2022-01-01 RX ADMIN — FINASTERIDE 5 MILLIGRAM(S): 5 TABLET, FILM COATED ORAL at 12:56

## 2022-01-01 RX ADMIN — Medication 100 MILLIGRAM(S): at 17:30

## 2022-01-01 RX ADMIN — ATOVAQUONE 1500 MILLIGRAM(S): 750 SUSPENSION ORAL at 09:10

## 2022-01-01 RX ADMIN — Medication 1 TABLET(S): at 04:27

## 2022-01-01 RX ADMIN — PANTOPRAZOLE SODIUM 40 MILLIGRAM(S): 20 TABLET, DELAYED RELEASE ORAL at 08:58

## 2022-01-01 RX ADMIN — CARVEDILOL PHOSPHATE 12.5 MILLIGRAM(S): 80 CAPSULE, EXTENDED RELEASE ORAL at 17:11

## 2022-01-01 RX ADMIN — SENNA PLUS 2 TABLET(S): 8.6 TABLET ORAL at 23:46

## 2022-01-01 RX ADMIN — Medication 2: at 09:53

## 2022-01-01 RX ADMIN — Medication 500000 UNIT(S): at 05:32

## 2022-01-01 RX ADMIN — POLYETHYLENE GLYCOL 3350 17 GRAM(S): 17 POWDER, FOR SOLUTION ORAL at 09:57

## 2022-01-01 RX ADMIN — Medication 100 MILLIGRAM(S): at 21:05

## 2022-01-01 RX ADMIN — MYCOPHENOLATE MOFETIL 1 GRAM(S): 250 CAPSULE ORAL at 20:16

## 2022-01-01 RX ADMIN — POLYETHYLENE GLYCOL 3350 17 GRAM(S): 17 POWDER, FOR SOLUTION ORAL at 11:50

## 2022-01-01 RX ADMIN — Medication 200 MILLIGRAM(S): at 05:59

## 2022-01-01 RX ADMIN — ERYTHROPOIETIN 10000 UNIT(S): 10000 INJECTION, SOLUTION INTRAVENOUS; SUBCUTANEOUS at 16:17

## 2022-01-01 RX ADMIN — Medication 100 MILLIGRAM(S): at 05:42

## 2022-01-01 RX ADMIN — Medication 1300 MILLIGRAM(S): at 21:51

## 2022-01-01 RX ADMIN — BRIVARACETAM 50 MILLIGRAM(S): 25 TABLET, FILM COATED ORAL at 17:38

## 2022-01-01 RX ADMIN — Medication 667 MILLIGRAM(S): at 07:55

## 2022-01-01 RX ADMIN — WARFARIN SODIUM 5 MILLIGRAM(S): 2.5 TABLET ORAL at 22:15

## 2022-01-01 RX ADMIN — Medication 1: at 13:37

## 2022-01-01 RX ADMIN — Medication 2 UNIT(S): at 17:33

## 2022-01-01 RX ADMIN — Medication 10 MILLIGRAM(S): at 16:53

## 2022-01-01 RX ADMIN — HEPARIN SODIUM 1100 UNIT(S)/HR: 5000 INJECTION INTRAVENOUS; SUBCUTANEOUS at 11:39

## 2022-01-01 RX ADMIN — CHLORHEXIDINE GLUCONATE 1 APPLICATION(S): 213 SOLUTION TOPICAL at 08:05

## 2022-01-01 RX ADMIN — PANTOPRAZOLE SODIUM 40 MILLIGRAM(S): 20 TABLET, DELAYED RELEASE ORAL at 06:43

## 2022-01-01 RX ADMIN — Medication 80 MILLIGRAM(S): at 05:11

## 2022-01-01 RX ADMIN — CARVEDILOL PHOSPHATE 3.12 MILLIGRAM(S): 80 CAPSULE, EXTENDED RELEASE ORAL at 06:08

## 2022-01-01 RX ADMIN — LEVETIRACETAM 400 MILLIGRAM(S): 250 TABLET, FILM COATED ORAL at 06:12

## 2022-01-01 RX ADMIN — MYCOPHENOLATE MOFETIL 1000 MILLIGRAM(S): 250 CAPSULE ORAL at 08:45

## 2022-01-01 RX ADMIN — Medication 63.75 MILLIMOLE(S): at 13:17

## 2022-01-01 RX ADMIN — SIROLIMUS 2.5 MILLIGRAM(S): 1 SOLUTION ORAL at 07:40

## 2022-01-01 RX ADMIN — Medication 2 DROP(S): at 06:48

## 2022-01-01 RX ADMIN — ALBUTEROL 2.5 MILLIGRAM(S): 90 AEROSOL, METERED ORAL at 20:56

## 2022-01-01 RX ADMIN — FLUCONAZOLE 200 MILLIGRAM(S): 150 TABLET ORAL at 13:14

## 2022-01-01 RX ADMIN — Medication 60 MILLIGRAM(S): at 06:12

## 2022-01-01 RX ADMIN — POLYETHYLENE GLYCOL 3350 17 GRAM(S): 17 POWDER, FOR SOLUTION ORAL at 11:38

## 2022-01-01 RX ADMIN — Medication 10 MILLIGRAM(S): at 05:28

## 2022-01-01 RX ADMIN — Medication 1300 MILLIGRAM(S): at 08:12

## 2022-01-01 RX ADMIN — CHLORHEXIDINE GLUCONATE 1 APPLICATION(S): 213 SOLUTION TOPICAL at 09:19

## 2022-01-01 RX ADMIN — SIROLIMUS 7 MILLIGRAM(S): 1 SOLUTION ORAL at 08:04

## 2022-01-01 RX ADMIN — Medication 200 MILLIGRAM(S): at 17:10

## 2022-01-01 RX ADMIN — FLUCONAZOLE 100 MILLIGRAM(S): 150 TABLET ORAL at 16:14

## 2022-01-01 RX ADMIN — ERTAPENEM SODIUM 120 MILLIGRAM(S): 1 INJECTION, POWDER, LYOPHILIZED, FOR SOLUTION INTRAMUSCULAR; INTRAVENOUS at 10:35

## 2022-01-01 RX ADMIN — Medication 500000 UNIT(S): at 17:51

## 2022-01-01 RX ADMIN — Medication 650 MILLIGRAM(S): at 22:55

## 2022-01-01 RX ADMIN — Medication 5 MILLIGRAM(S): at 17:29

## 2022-01-01 RX ADMIN — BRIVARACETAM 50 MILLIGRAM(S): 25 TABLET, FILM COATED ORAL at 05:27

## 2022-01-01 RX ADMIN — Medication 500 MILLIGRAM(S): at 05:48

## 2022-01-01 RX ADMIN — VALGANCICLOVIR 450 MILLIGRAM(S): 450 TABLET, FILM COATED ORAL at 18:06

## 2022-01-01 RX ADMIN — Medication 1 GRAM(S): at 18:09

## 2022-01-01 RX ADMIN — MEROPENEM 100 MILLIGRAM(S): 1 INJECTION INTRAVENOUS at 17:08

## 2022-01-01 RX ADMIN — Medication 4 MILLIGRAM(S): at 17:46

## 2022-01-01 RX ADMIN — Medication 2 DROP(S): at 05:50

## 2022-01-01 RX ADMIN — Medication 200 MILLIGRAM(S): at 06:11

## 2022-01-01 RX ADMIN — MYCOPHENOLATE MOFETIL 500 MILLIGRAM(S): 250 CAPSULE ORAL at 05:03

## 2022-01-01 RX ADMIN — Medication 2: at 08:08

## 2022-01-01 RX ADMIN — Medication 975 MILLIGRAM(S): at 11:32

## 2022-01-01 RX ADMIN — POLYETHYLENE GLYCOL 3350 17 GRAM(S): 17 POWDER, FOR SOLUTION ORAL at 06:29

## 2022-01-01 RX ADMIN — DIVALPROEX SODIUM 1000 MILLIGRAM(S): 500 TABLET, DELAYED RELEASE ORAL at 19:57

## 2022-01-01 RX ADMIN — LEVETIRACETAM 250 MILLIGRAM(S): 250 TABLET, FILM COATED ORAL at 06:25

## 2022-01-01 RX ADMIN — BRIVARACETAM 50 MILLIGRAM(S): 25 TABLET, FILM COATED ORAL at 05:59

## 2022-01-01 RX ADMIN — POLYETHYLENE GLYCOL 3350 17 GRAM(S): 17 POWDER, FOR SOLUTION ORAL at 12:02

## 2022-01-01 RX ADMIN — Medication 50 MILLIGRAM(S): at 23:21

## 2022-01-01 RX ADMIN — PANTOPRAZOLE SODIUM 40 MILLIGRAM(S): 20 TABLET, DELAYED RELEASE ORAL at 12:55

## 2022-01-01 RX ADMIN — TRAMADOL HYDROCHLORIDE 25 MILLIGRAM(S): 50 TABLET ORAL at 00:20

## 2022-01-01 RX ADMIN — SIROLIMUS 7 MILLIGRAM(S): 1 SOLUTION ORAL at 09:33

## 2022-01-01 RX ADMIN — Medication 1: at 17:46

## 2022-01-01 RX ADMIN — DIVALPROEX SODIUM 500 MILLIGRAM(S): 500 TABLET, DELAYED RELEASE ORAL at 20:13

## 2022-01-01 RX ADMIN — Medication 2: at 00:13

## 2022-01-01 RX ADMIN — Medication 4: at 18:25

## 2022-01-01 RX ADMIN — Medication 2 DROP(S): at 11:46

## 2022-01-01 RX ADMIN — MYCOPHENOLATE MOFETIL 500 MILLIGRAM(S): 250 CAPSULE ORAL at 09:34

## 2022-01-01 RX ADMIN — Medication 8 MILLIGRAM(S): at 21:36

## 2022-01-01 RX ADMIN — POLYETHYLENE GLYCOL 3350 17 GRAM(S): 17 POWDER, FOR SOLUTION ORAL at 11:55

## 2022-01-01 RX ADMIN — Medication 10 MILLIGRAM(S): at 06:08

## 2022-01-01 RX ADMIN — HEPARIN SODIUM 5000 UNIT(S): 5000 INJECTION INTRAVENOUS; SUBCUTANEOUS at 06:17

## 2022-01-01 RX ADMIN — PANTOPRAZOLE SODIUM 40 MILLIGRAM(S): 20 TABLET, DELAYED RELEASE ORAL at 09:39

## 2022-01-01 RX ADMIN — Medication 60 MILLIGRAM(S): at 05:48

## 2022-01-01 RX ADMIN — VALGANCICLOVIR 450 MILLIGRAM(S): 450 TABLET, FILM COATED ORAL at 12:15

## 2022-01-01 RX ADMIN — Medication 1 GRAM(S): at 06:09

## 2022-01-01 RX ADMIN — BRIVARACETAM 50 MILLIGRAM(S): 25 TABLET, FILM COATED ORAL at 05:33

## 2022-01-01 RX ADMIN — SEVELAMER CARBONATE 800 MILLIGRAM(S): 2400 POWDER, FOR SUSPENSION ORAL at 18:14

## 2022-01-01 RX ADMIN — Medication 1 TABLET(S): at 13:35

## 2022-01-01 RX ADMIN — Medication 2 DROP(S): at 21:52

## 2022-01-01 RX ADMIN — Medication 5 UNIT(S): at 17:44

## 2022-01-01 RX ADMIN — BRIVARACETAM 220 MILLIGRAM(S): 25 TABLET, FILM COATED ORAL at 16:39

## 2022-01-01 RX ADMIN — Medication 650 MILLIGRAM(S): at 14:54

## 2022-01-01 RX ADMIN — SODIUM CHLORIDE 125 MILLILITER(S): 9 INJECTION INTRAMUSCULAR; INTRAVENOUS; SUBCUTANEOUS at 17:30

## 2022-01-01 RX ADMIN — Medication 1 TABLET(S): at 12:45

## 2022-01-01 RX ADMIN — SEVELAMER CARBONATE 800 MILLIGRAM(S): 2400 POWDER, FOR SUSPENSION ORAL at 18:52

## 2022-01-01 RX ADMIN — Medication 6: at 17:21

## 2022-01-01 RX ADMIN — Medication 3: at 13:21

## 2022-01-01 RX ADMIN — CHLORHEXIDINE GLUCONATE 1 APPLICATION(S): 213 SOLUTION TOPICAL at 08:34

## 2022-01-01 RX ADMIN — Medication 2: at 12:30

## 2022-01-01 RX ADMIN — MAGNESIUM OXIDE 400 MG ORAL TABLET 400 MILLIGRAM(S): 241.3 TABLET ORAL at 13:04

## 2022-01-01 RX ADMIN — BRIVARACETAM 50 MILLIGRAM(S): 25 TABLET, FILM COATED ORAL at 06:24

## 2022-01-01 RX ADMIN — Medication 2: at 17:35

## 2022-01-01 RX ADMIN — Medication 8 MILLIGRAM(S): at 21:10

## 2022-01-01 RX ADMIN — INSULIN GLARGINE 5 UNIT(S): 100 INJECTION, SOLUTION SUBCUTANEOUS at 22:08

## 2022-01-01 RX ADMIN — MAGNESIUM OXIDE 400 MG ORAL TABLET 400 MILLIGRAM(S): 241.3 TABLET ORAL at 13:42

## 2022-01-01 RX ADMIN — Medication 650 MILLIGRAM(S): at 21:40

## 2022-01-01 RX ADMIN — Medication 10 MILLIGRAM(S): at 05:46

## 2022-01-01 RX ADMIN — INSULIN GLARGINE 5 UNIT(S): 100 INJECTION, SOLUTION SUBCUTANEOUS at 21:22

## 2022-01-01 RX ADMIN — Medication 500000 UNIT(S): at 11:31

## 2022-01-01 RX ADMIN — MYCOPHENOLATE MOFETIL 1000 MILLIGRAM(S): 250 CAPSULE ORAL at 20:33

## 2022-01-01 RX ADMIN — Medication 500000 UNIT(S): at 17:10

## 2022-01-01 RX ADMIN — SEVELAMER CARBONATE 800 MILLIGRAM(S): 2400 POWDER, FOR SUSPENSION ORAL at 11:40

## 2022-01-01 RX ADMIN — BELATACEPT 200 MILLIGRAM(S): 250 INJECTION, POWDER, LYOPHILIZED, FOR SOLUTION INTRAVENOUS at 13:55

## 2022-01-01 RX ADMIN — DIVALPROEX SODIUM 250 MILLIGRAM(S): 500 TABLET, DELAYED RELEASE ORAL at 13:12

## 2022-01-01 RX ADMIN — IVERMECTIN 12 MILLIGRAM(S): 3 TABLET ORAL at 11:38

## 2022-01-01 RX ADMIN — APIXABAN 5 MILLIGRAM(S): 2.5 TABLET, FILM COATED ORAL at 17:39

## 2022-01-01 RX ADMIN — Medication 100 MILLIGRAM(S): at 13:08

## 2022-01-01 RX ADMIN — Medication 5 MILLIGRAM(S): at 06:29

## 2022-01-01 RX ADMIN — Medication 2 DROP(S): at 17:12

## 2022-01-01 RX ADMIN — AMLODIPINE BESYLATE 10 MILLIGRAM(S): 2.5 TABLET ORAL at 05:12

## 2022-01-01 RX ADMIN — Medication 5 UNIT(S): at 17:16

## 2022-01-01 RX ADMIN — Medication 40 MICROGRAM(S): at 21:36

## 2022-01-01 RX ADMIN — SEVELAMER CARBONATE 800 MILLIGRAM(S): 2400 POWDER, FOR SUSPENSION ORAL at 09:07

## 2022-01-01 RX ADMIN — HYDROMORPHONE HYDROCHLORIDE 0.5 MILLIGRAM(S): 2 INJECTION INTRAMUSCULAR; INTRAVENOUS; SUBCUTANEOUS at 16:02

## 2022-01-01 RX ADMIN — MYCOPHENOLATE MOFETIL 1000 MILLIGRAM(S): 250 CAPSULE ORAL at 21:31

## 2022-01-01 RX ADMIN — FINASTERIDE 5 MILLIGRAM(S): 5 TABLET, FILM COATED ORAL at 12:46

## 2022-01-01 RX ADMIN — Medication 100 MILLIGRAM(S): at 06:32

## 2022-01-01 RX ADMIN — VALGANCICLOVIR 450 MILLIGRAM(S): 450 TABLET, FILM COATED ORAL at 17:27

## 2022-01-01 RX ADMIN — Medication 1 TABLET(S): at 13:17

## 2022-01-01 RX ADMIN — MYCOPHENOLATE MOFETIL 1000 MILLIGRAM(S): 250 CAPSULE ORAL at 08:58

## 2022-01-01 RX ADMIN — Medication 3 UNIT(S): at 12:44

## 2022-01-01 RX ADMIN — MAGNESIUM OXIDE 400 MG ORAL TABLET 400 MILLIGRAM(S): 241.3 TABLET ORAL at 13:53

## 2022-01-01 RX ADMIN — Medication 10 MILLIGRAM(S): at 05:10

## 2022-01-01 RX ADMIN — Medication 2 UNIT(S): at 13:14

## 2022-01-01 RX ADMIN — CEFEPIME 100 MILLIGRAM(S): 1 INJECTION, POWDER, FOR SOLUTION INTRAMUSCULAR; INTRAVENOUS at 21:12

## 2022-01-01 RX ADMIN — BRIVARACETAM 50 MILLIGRAM(S): 25 TABLET, FILM COATED ORAL at 05:54

## 2022-01-01 RX ADMIN — Medication 6 MILLIGRAM(S): at 22:32

## 2022-01-01 RX ADMIN — FLUCONAZOLE 400 MILLIGRAM(S): 150 TABLET ORAL at 14:55

## 2022-01-01 RX ADMIN — Medication 200 MILLIGRAM(S): at 05:25

## 2022-01-01 RX ADMIN — MAGNESIUM OXIDE 400 MG ORAL TABLET 400 MILLIGRAM(S): 241.3 TABLET ORAL at 21:51

## 2022-01-01 RX ADMIN — AMLODIPINE BESYLATE 10 MILLIGRAM(S): 2.5 TABLET ORAL at 05:05

## 2022-01-01 RX ADMIN — Medication 650 MILLIGRAM(S): at 10:05

## 2022-01-01 RX ADMIN — Medication 75 MILLIGRAM(S): at 22:32

## 2022-01-01 RX ADMIN — Medication 650 MILLIGRAM(S): at 19:15

## 2022-01-01 RX ADMIN — Medication 500000 UNIT(S): at 06:14

## 2022-01-01 RX ADMIN — Medication 75 MILLIGRAM(S): at 12:56

## 2022-01-01 RX ADMIN — Medication 40 MILLIGRAM(S): at 13:49

## 2022-01-01 RX ADMIN — Medication 500 MILLIGRAM(S): at 17:55

## 2022-01-01 RX ADMIN — Medication 1: at 11:42

## 2022-01-01 RX ADMIN — APIXABAN 5 MILLIGRAM(S): 2.5 TABLET, FILM COATED ORAL at 05:35

## 2022-01-01 RX ADMIN — Medication 1: at 17:01

## 2022-01-01 RX ADMIN — AMLODIPINE BESYLATE 10 MILLIGRAM(S): 2.5 TABLET ORAL at 08:07

## 2022-01-01 RX ADMIN — Medication 2 DROP(S): at 18:41

## 2022-01-01 RX ADMIN — HEPARIN SODIUM 5000 UNIT(S): 5000 INJECTION INTRAVENOUS; SUBCUTANEOUS at 13:30

## 2022-01-01 RX ADMIN — MAGNESIUM OXIDE 400 MG ORAL TABLET 400 MILLIGRAM(S): 241.3 TABLET ORAL at 12:08

## 2022-01-01 RX ADMIN — Medication 10 MILLIGRAM(S): at 01:13

## 2022-01-01 RX ADMIN — Medication 2: at 13:21

## 2022-01-01 RX ADMIN — DIVALPROEX SODIUM 500 MILLIGRAM(S): 500 TABLET, DELAYED RELEASE ORAL at 05:55

## 2022-01-01 RX ADMIN — ERYTHROPOIETIN 10000 UNIT(S): 10000 INJECTION, SOLUTION INTRAVENOUS; SUBCUTANEOUS at 15:16

## 2022-01-01 RX ADMIN — ENOXAPARIN SODIUM 60 MILLIGRAM(S): 100 INJECTION SUBCUTANEOUS at 08:47

## 2022-01-01 RX ADMIN — Medication 50 MILLIGRAM(S): at 23:13

## 2022-01-01 RX ADMIN — Medication 8 MILLIGRAM(S): at 21:21

## 2022-01-01 RX ADMIN — MYCOPHENOLATE MOFETIL 1000 MILLIGRAM(S): 250 CAPSULE ORAL at 21:09

## 2022-01-01 RX ADMIN — Medication 1: at 06:39

## 2022-01-01 RX ADMIN — AMLODIPINE BESYLATE 10 MILLIGRAM(S): 2.5 TABLET ORAL at 06:40

## 2022-01-01 RX ADMIN — HEPARIN SODIUM 5000 UNIT(S): 5000 INJECTION INTRAVENOUS; SUBCUTANEOUS at 06:11

## 2022-01-01 RX ADMIN — Medication 8 MILLIGRAM(S): at 21:02

## 2022-01-01 RX ADMIN — ENOXAPARIN SODIUM 60 MILLIGRAM(S): 100 INJECTION SUBCUTANEOUS at 17:29

## 2022-01-01 RX ADMIN — MYCOPHENOLATE MOFETIL 1000 MILLIGRAM(S): 250 CAPSULE ORAL at 17:28

## 2022-01-01 RX ADMIN — Medication 80 MILLIGRAM(S): at 05:16

## 2022-01-01 RX ADMIN — Medication 2 DROP(S): at 05:28

## 2022-01-01 RX ADMIN — TRAMADOL HYDROCHLORIDE 25 MILLIGRAM(S): 50 TABLET ORAL at 14:51

## 2022-01-01 RX ADMIN — CEFEPIME 100 MILLIGRAM(S): 1 INJECTION, POWDER, FOR SOLUTION INTRAMUSCULAR; INTRAVENOUS at 22:04

## 2022-01-01 RX ADMIN — Medication 50 MICROGRAM(S): at 06:37

## 2022-01-01 RX ADMIN — Medication 3: at 17:44

## 2022-01-01 RX ADMIN — Medication 1 UNIT(S): at 11:58

## 2022-01-01 RX ADMIN — Medication 2: at 18:34

## 2022-01-01 RX ADMIN — MYCOPHENOLATE MOFETIL 1000 MILLIGRAM(S): 250 CAPSULE ORAL at 21:39

## 2022-01-01 RX ADMIN — SIROLIMUS 6 MILLIGRAM(S): 1 SOLUTION ORAL at 08:20

## 2022-01-01 RX ADMIN — Medication 50 MICROGRAM(S): at 06:01

## 2022-01-01 RX ADMIN — Medication 2: at 17:32

## 2022-01-01 RX ADMIN — Medication 1 TABLET(S): at 11:38

## 2022-01-01 RX ADMIN — Medication 1: at 17:34

## 2022-01-01 RX ADMIN — CHLORHEXIDINE GLUCONATE 1 APPLICATION(S): 213 SOLUTION TOPICAL at 08:16

## 2022-01-01 RX ADMIN — LEVETIRACETAM 250 MILLIGRAM(S): 250 TABLET, FILM COATED ORAL at 13:18

## 2022-01-01 RX ADMIN — Medication 25 MILLIGRAM(S): at 21:24

## 2022-01-01 RX ADMIN — Medication 7 UNIT(S): at 13:19

## 2022-01-01 RX ADMIN — HEPARIN SODIUM 5000 UNIT(S): 5000 INJECTION INTRAVENOUS; SUBCUTANEOUS at 15:30

## 2022-01-01 RX ADMIN — MYCOPHENOLATE MOFETIL 500 MILLIGRAM(S): 250 CAPSULE ORAL at 21:46

## 2022-01-01 RX ADMIN — Medication 200 MILLIGRAM(S): at 17:17

## 2022-01-01 RX ADMIN — Medication 50 MILLILITER(S): at 16:30

## 2022-01-01 RX ADMIN — Medication 650 MILLIGRAM(S): at 18:20

## 2022-01-01 RX ADMIN — Medication 1 TABLET(S): at 12:15

## 2022-01-01 RX ADMIN — LEVETIRACETAM 250 MILLIGRAM(S): 250 TABLET, FILM COATED ORAL at 17:47

## 2022-01-01 RX ADMIN — Medication 50 MICROGRAM(S): at 06:15

## 2022-01-01 RX ADMIN — VALGANCICLOVIR 450 MILLIGRAM(S): 450 TABLET, FILM COATED ORAL at 11:23

## 2022-01-01 RX ADMIN — Medication 100 MILLIGRAM(S): at 04:26

## 2022-01-01 RX ADMIN — Medication 100 MILLIGRAM(S): at 20:56

## 2022-01-01 RX ADMIN — CHLORHEXIDINE GLUCONATE 1 APPLICATION(S): 213 SOLUTION TOPICAL at 18:12

## 2022-01-01 RX ADMIN — TACROLIMUS 1 MILLIGRAM(S): 5 CAPSULE ORAL at 14:15

## 2022-01-01 RX ADMIN — Medication 1 APPLICATION(S): at 18:59

## 2022-01-01 RX ADMIN — Medication 4: at 13:37

## 2022-01-01 RX ADMIN — CHLORHEXIDINE GLUCONATE 1 APPLICATION(S): 213 SOLUTION TOPICAL at 12:30

## 2022-01-01 RX ADMIN — SEVELAMER CARBONATE 800 MILLIGRAM(S): 2400 POWDER, FOR SUSPENSION ORAL at 11:46

## 2022-01-01 RX ADMIN — CHLORHEXIDINE GLUCONATE 1 APPLICATION(S): 213 SOLUTION TOPICAL at 09:49

## 2022-01-01 RX ADMIN — FINASTERIDE 5 MILLIGRAM(S): 5 TABLET, FILM COATED ORAL at 12:12

## 2022-01-01 RX ADMIN — Medication 40 MILLIGRAM(S): at 18:03

## 2022-01-01 RX ADMIN — Medication 7 UNIT(S): at 09:32

## 2022-01-01 RX ADMIN — Medication 6: at 17:57

## 2022-01-01 RX ADMIN — Medication 5 UNIT(S): at 18:36

## 2022-01-01 RX ADMIN — ENOXAPARIN SODIUM 60 MILLIGRAM(S): 100 INJECTION SUBCUTANEOUS at 20:30

## 2022-01-01 RX ADMIN — BRIVARACETAM 50 MILLIGRAM(S): 25 TABLET, FILM COATED ORAL at 17:42

## 2022-01-01 RX ADMIN — Medication 75 MILLIGRAM(S): at 19:28

## 2022-01-01 RX ADMIN — LEVETIRACETAM 125 MILLIGRAM(S): 250 TABLET, FILM COATED ORAL at 12:47

## 2022-01-01 RX ADMIN — Medication 10 MILLIGRAM(S): at 21:11

## 2022-01-01 RX ADMIN — HYDROMORPHONE HYDROCHLORIDE 0.5 MILLIGRAM(S): 2 INJECTION INTRAMUSCULAR; INTRAVENOUS; SUBCUTANEOUS at 01:19

## 2022-01-01 RX ADMIN — Medication 5 UNIT(S): at 08:25

## 2022-01-01 RX ADMIN — Medication 500000 UNIT(S): at 23:21

## 2022-01-01 RX ADMIN — Medication 2 DROP(S): at 17:31

## 2022-01-01 RX ADMIN — BRIVARACETAM 50 MILLIGRAM(S): 25 TABLET, FILM COATED ORAL at 05:22

## 2022-01-01 RX ADMIN — Medication 125 MILLIGRAM(S): at 17:34

## 2022-01-01 RX ADMIN — LEVETIRACETAM 400 MILLIGRAM(S): 250 TABLET, FILM COATED ORAL at 05:07

## 2022-01-01 RX ADMIN — AMLODIPINE BESYLATE 10 MILLIGRAM(S): 2.5 TABLET ORAL at 13:20

## 2022-01-01 RX ADMIN — MYCOPHENOLATE MOFETIL 250 MILLIGRAM(S): 250 CAPSULE ORAL at 17:34

## 2022-01-01 RX ADMIN — SODIUM ZIRCONIUM CYCLOSILICATE 5 GRAM(S): 10 POWDER, FOR SUSPENSION ORAL at 10:55

## 2022-01-01 RX ADMIN — Medication 650 MILLIGRAM(S): at 07:00

## 2022-01-01 RX ADMIN — CARVEDILOL PHOSPHATE 3.12 MILLIGRAM(S): 80 CAPSULE, EXTENDED RELEASE ORAL at 18:27

## 2022-01-01 RX ADMIN — CHLORHEXIDINE GLUCONATE 1 APPLICATION(S): 213 SOLUTION TOPICAL at 12:55

## 2022-01-01 RX ADMIN — HEPARIN SODIUM 5000 UNIT(S): 5000 INJECTION INTRAVENOUS; SUBCUTANEOUS at 13:07

## 2022-01-01 RX ADMIN — Medication 2 DROP(S): at 05:47

## 2022-01-01 RX ADMIN — Medication 6 MILLIGRAM(S): at 21:06

## 2022-01-01 RX ADMIN — ATORVASTATIN CALCIUM 40 MILLIGRAM(S): 80 TABLET, FILM COATED ORAL at 22:35

## 2022-01-01 RX ADMIN — MYCOPHENOLATE MOFETIL 500 MILLIGRAM(S): 250 CAPSULE ORAL at 20:45

## 2022-01-01 RX ADMIN — SENNA PLUS 2 TABLET(S): 8.6 TABLET ORAL at 22:32

## 2022-01-01 RX ADMIN — Medication 500 MILLIGRAM(S): at 17:53

## 2022-01-01 RX ADMIN — Medication 650 MILLIGRAM(S): at 23:35

## 2022-01-01 RX ADMIN — Medication 2 DROP(S): at 23:53

## 2022-01-01 RX ADMIN — Medication 100 MILLIGRAM(S): at 13:55

## 2022-01-01 RX ADMIN — HUMAN INSULIN 2 UNIT(S): 100 INJECTION, SUSPENSION SUBCUTANEOUS at 21:27

## 2022-01-01 RX ADMIN — Medication 1 TABLET(S): at 13:18

## 2022-01-01 RX ADMIN — HYDROMORPHONE HYDROCHLORIDE 0.5 MILLIGRAM(S): 2 INJECTION INTRAMUSCULAR; INTRAVENOUS; SUBCUTANEOUS at 14:35

## 2022-01-01 RX ADMIN — PANTOPRAZOLE SODIUM 40 MILLIGRAM(S): 20 TABLET, DELAYED RELEASE ORAL at 05:37

## 2022-01-01 RX ADMIN — Medication 100 MILLIGRAM(S): at 22:47

## 2022-01-01 RX ADMIN — Medication 4 UNIT(S): at 11:32

## 2022-01-01 RX ADMIN — ERYTHROPOIETIN 10000 UNIT(S): 10000 INJECTION, SOLUTION INTRAVENOUS; SUBCUTANEOUS at 15:27

## 2022-01-01 RX ADMIN — Medication 1 TABLET(S): at 11:34

## 2022-01-01 RX ADMIN — Medication 500000 UNIT(S): at 17:17

## 2022-01-01 RX ADMIN — SEVELAMER CARBONATE 800 MILLIGRAM(S): 2400 POWDER, FOR SUSPENSION ORAL at 09:10

## 2022-01-01 RX ADMIN — CARVEDILOL PHOSPHATE 12.5 MILLIGRAM(S): 80 CAPSULE, EXTENDED RELEASE ORAL at 05:30

## 2022-01-01 RX ADMIN — Medication 4 UNIT(S): at 11:40

## 2022-01-01 RX ADMIN — AMLODIPINE BESYLATE 5 MILLIGRAM(S): 2.5 TABLET ORAL at 05:57

## 2022-01-01 RX ADMIN — Medication 200 MILLIGRAM(S): at 06:14

## 2022-01-01 RX ADMIN — ATORVASTATIN CALCIUM 40 MILLIGRAM(S): 80 TABLET, FILM COATED ORAL at 21:25

## 2022-01-01 RX ADMIN — Medication 1300 MILLIGRAM(S): at 18:27

## 2022-01-01 RX ADMIN — Medication 4 UNIT(S): at 10:14

## 2022-01-01 RX ADMIN — Medication 2: at 14:13

## 2022-01-01 RX ADMIN — POLYETHYLENE GLYCOL 3350 17 GRAM(S): 17 POWDER, FOR SOLUTION ORAL at 13:19

## 2022-01-01 RX ADMIN — BRIVARACETAM 50 MILLIGRAM(S): 25 TABLET, FILM COATED ORAL at 20:14

## 2022-01-01 RX ADMIN — SEVELAMER CARBONATE 800 MILLIGRAM(S): 2400 POWDER, FOR SUSPENSION ORAL at 12:19

## 2022-01-01 RX ADMIN — VALGANCICLOVIR 450 MILLIGRAM(S): 450 TABLET, FILM COATED ORAL at 19:44

## 2022-01-01 RX ADMIN — Medication 650 MILLIGRAM(S): at 22:05

## 2022-01-01 RX ADMIN — Medication 100 MILLIGRAM(S): at 14:00

## 2022-01-01 RX ADMIN — Medication 200 MILLIGRAM(S): at 17:32

## 2022-01-01 RX ADMIN — ERTAPENEM SODIUM 120 MILLIGRAM(S): 1 INJECTION, POWDER, LYOPHILIZED, FOR SOLUTION INTRAMUSCULAR; INTRAVENOUS at 14:28

## 2022-01-01 RX ADMIN — Medication 4 MILLIGRAM(S): at 17:26

## 2022-01-01 RX ADMIN — WARFARIN SODIUM 8 MILLIGRAM(S): 2.5 TABLET ORAL at 22:55

## 2022-01-01 RX ADMIN — Medication 1: at 12:36

## 2022-01-01 RX ADMIN — Medication 2 UNIT(S): at 09:43

## 2022-01-01 RX ADMIN — SENNA PLUS 2 TABLET(S): 8.6 TABLET ORAL at 22:08

## 2022-01-01 RX ADMIN — Medication 2 DROP(S): at 12:37

## 2022-01-01 RX ADMIN — MAGNESIUM OXIDE 400 MG ORAL TABLET 400 MILLIGRAM(S): 241.3 TABLET ORAL at 07:55

## 2022-01-01 RX ADMIN — SEVELAMER CARBONATE 800 MILLIGRAM(S): 2400 POWDER, FOR SUSPENSION ORAL at 11:59

## 2022-01-01 RX ADMIN — INSULIN GLARGINE 4 UNIT(S): 100 INJECTION, SOLUTION SUBCUTANEOUS at 22:03

## 2022-01-01 RX ADMIN — LEVETIRACETAM 250 MILLIGRAM(S): 250 TABLET, FILM COATED ORAL at 17:19

## 2022-01-01 RX ADMIN — Medication 650 MILLIGRAM(S): at 05:38

## 2022-01-01 RX ADMIN — Medication 5 MILLIGRAM(S): at 05:43

## 2022-01-01 RX ADMIN — Medication 10 MILLIGRAM(S): at 05:51

## 2022-01-01 RX ADMIN — Medication 100 MILLIGRAM(S): at 14:37

## 2022-01-01 RX ADMIN — SIROLIMUS 3.5 MILLIGRAM(S): 1 SOLUTION ORAL at 07:41

## 2022-01-01 RX ADMIN — Medication 650 MILLIGRAM(S): at 00:21

## 2022-01-01 RX ADMIN — Medication 1300 MILLIGRAM(S): at 05:09

## 2022-01-01 RX ADMIN — CARVEDILOL PHOSPHATE 3.12 MILLIGRAM(S): 80 CAPSULE, EXTENDED RELEASE ORAL at 01:54

## 2022-01-01 RX ADMIN — CARVEDILOL PHOSPHATE 3.12 MILLIGRAM(S): 80 CAPSULE, EXTENDED RELEASE ORAL at 05:55

## 2022-01-01 RX ADMIN — Medication 75 MILLIGRAM(S): at 22:50

## 2022-01-01 RX ADMIN — ERTAPENEM SODIUM 100 MILLIGRAM(S): 1 INJECTION, POWDER, LYOPHILIZED, FOR SOLUTION INTRAMUSCULAR; INTRAVENOUS at 16:57

## 2022-01-01 RX ADMIN — Medication 200 MILLIGRAM(S): at 23:39

## 2022-01-01 RX ADMIN — Medication 3 MILLIGRAM(S): at 22:05

## 2022-01-01 RX ADMIN — Medication 650 MILLIGRAM(S): at 13:30

## 2022-01-01 RX ADMIN — SENNA PLUS 2 TABLET(S): 8.6 TABLET ORAL at 21:05

## 2022-01-01 RX ADMIN — HUMAN INSULIN 2 UNIT(S): 100 INJECTION, SUSPENSION SUBCUTANEOUS at 14:46

## 2022-01-01 RX ADMIN — ENOXAPARIN SODIUM 60 MILLIGRAM(S): 100 INJECTION SUBCUTANEOUS at 17:39

## 2022-01-01 RX ADMIN — Medication 1: at 09:07

## 2022-01-01 RX ADMIN — Medication 3 UNIT(S): at 17:13

## 2022-01-01 RX ADMIN — CHLORHEXIDINE GLUCONATE 1 APPLICATION(S): 213 SOLUTION TOPICAL at 11:29

## 2022-01-01 RX ADMIN — Medication 2: at 02:47

## 2022-01-01 RX ADMIN — VALGANCICLOVIR 450 MILLIGRAM(S): 450 TABLET, FILM COATED ORAL at 12:39

## 2022-01-01 RX ADMIN — Medication 100 MEQ/KG/HR: at 08:18

## 2022-01-01 RX ADMIN — Medication 80 MILLIGRAM(S): at 05:30

## 2022-01-01 RX ADMIN — Medication 1: at 18:28

## 2022-01-01 RX ADMIN — Medication 1 TABLET(S): at 13:47

## 2022-01-01 RX ADMIN — MYCOPHENOLATE MOFETIL 250 MILLIGRAM(S): 250 CAPSULE ORAL at 07:41

## 2022-01-01 RX ADMIN — Medication 5 MILLIGRAM(S): at 05:53

## 2022-01-01 RX ADMIN — Medication 650 MILLIGRAM(S): at 13:02

## 2022-01-01 RX ADMIN — HEPARIN SODIUM 5000 UNIT(S): 5000 INJECTION INTRAVENOUS; SUBCUTANEOUS at 21:42

## 2022-01-01 RX ADMIN — Medication 650 MILLIGRAM(S): at 22:34

## 2022-01-01 RX ADMIN — POLYETHYLENE GLYCOL 3350 17 GRAM(S): 17 POWDER, FOR SOLUTION ORAL at 12:08

## 2022-01-01 RX ADMIN — CHLORHEXIDINE GLUCONATE 15 MILLILITER(S): 213 SOLUTION TOPICAL at 06:05

## 2022-01-01 RX ADMIN — Medication 4 MILLIGRAM(S): at 05:55

## 2022-01-01 RX ADMIN — Medication 25 MILLIGRAM(S): at 13:38

## 2022-01-01 RX ADMIN — FOSPHENYTOIN 140 MILLIGRAM(S) PE: 50 INJECTION INTRAMUSCULAR; INTRAVENOUS at 21:05

## 2022-01-01 RX ADMIN — Medication 8 MILLIGRAM(S): at 21:42

## 2022-01-01 RX ADMIN — AMLODIPINE BESYLATE 10 MILLIGRAM(S): 2.5 TABLET ORAL at 13:27

## 2022-01-01 RX ADMIN — INSULIN HUMAN 5 UNIT(S): 100 INJECTION, SOLUTION SUBCUTANEOUS at 16:30

## 2022-01-01 RX ADMIN — Medication 500000 UNIT(S): at 17:09

## 2022-01-01 RX ADMIN — POLYETHYLENE GLYCOL 3350 17 GRAM(S): 17 POWDER, FOR SOLUTION ORAL at 17:30

## 2022-01-01 RX ADMIN — Medication 500000 UNIT(S): at 11:46

## 2022-01-01 RX ADMIN — Medication 2 DROP(S): at 11:39

## 2022-01-01 RX ADMIN — PANTOPRAZOLE SODIUM 40 MILLIGRAM(S): 20 TABLET, DELAYED RELEASE ORAL at 11:59

## 2022-01-01 RX ADMIN — Medication 1: at 12:49

## 2022-01-01 RX ADMIN — FOSPHENYTOIN 108 MILLIGRAM(S) PE: 50 INJECTION INTRAMUSCULAR; INTRAVENOUS at 06:11

## 2022-01-01 RX ADMIN — Medication 1: at 01:04

## 2022-01-01 RX ADMIN — HEPARIN SODIUM 5000 UNIT(S): 5000 INJECTION INTRAVENOUS; SUBCUTANEOUS at 13:42

## 2022-01-01 RX ADMIN — SIROLIMUS 6 MILLIGRAM(S): 1 SOLUTION ORAL at 07:58

## 2022-01-01 RX ADMIN — MYCOPHENOLATE MOFETIL 500 MILLIGRAM(S): 250 CAPSULE ORAL at 09:23

## 2022-01-01 RX ADMIN — Medication 1: at 12:10

## 2022-01-01 RX ADMIN — Medication 975 MILLIGRAM(S): at 17:00

## 2022-01-01 RX ADMIN — DIVALPROEX SODIUM 250 MILLIGRAM(S): 500 TABLET, DELAYED RELEASE ORAL at 12:08

## 2022-01-01 RX ADMIN — Medication 4 UNIT(S): at 12:18

## 2022-01-01 RX ADMIN — INSULIN GLARGINE 5 UNIT(S): 100 INJECTION, SOLUTION SUBCUTANEOUS at 21:45

## 2022-01-01 RX ADMIN — ATORVASTATIN CALCIUM 40 MILLIGRAM(S): 80 TABLET, FILM COATED ORAL at 22:46

## 2022-01-01 RX ADMIN — Medication 3 UNIT(S): at 08:57

## 2022-01-01 RX ADMIN — Medication 25 MILLIGRAM(S): at 01:30

## 2022-01-01 RX ADMIN — INSULIN GLARGINE 10 UNIT(S): 100 INJECTION, SOLUTION SUBCUTANEOUS at 22:16

## 2022-01-01 RX ADMIN — CARVEDILOL PHOSPHATE 25 MILLIGRAM(S): 80 CAPSULE, EXTENDED RELEASE ORAL at 06:27

## 2022-01-01 RX ADMIN — CARVEDILOL PHOSPHATE 3.12 MILLIGRAM(S): 80 CAPSULE, EXTENDED RELEASE ORAL at 23:44

## 2022-01-01 RX ADMIN — TACROLIMUS 3 MILLIGRAM(S): 5 CAPSULE ORAL at 07:42

## 2022-01-01 RX ADMIN — Medication 75 MILLIGRAM(S): at 05:25

## 2022-01-01 RX ADMIN — Medication 6 MILLIGRAM(S): at 22:20

## 2022-01-01 RX ADMIN — CARVEDILOL PHOSPHATE 12.5 MILLIGRAM(S): 80 CAPSULE, EXTENDED RELEASE ORAL at 18:00

## 2022-01-01 RX ADMIN — MYCOPHENOLATE MOFETIL 500 MILLIGRAM(S): 250 CAPSULE ORAL at 09:02

## 2022-01-01 RX ADMIN — Medication 2: at 07:50

## 2022-01-01 RX ADMIN — CHLORHEXIDINE GLUCONATE 1 APPLICATION(S): 213 SOLUTION TOPICAL at 14:13

## 2022-01-01 RX ADMIN — Medication 2: at 11:32

## 2022-01-01 RX ADMIN — Medication 25 MILLIGRAM(S): at 13:33

## 2022-01-01 RX ADMIN — CARVEDILOL PHOSPHATE 3.12 MILLIGRAM(S): 80 CAPSULE, EXTENDED RELEASE ORAL at 12:06

## 2022-01-01 RX ADMIN — HYDROMORPHONE HYDROCHLORIDE 0.5 MILLIGRAM(S): 2 INJECTION INTRAMUSCULAR; INTRAVENOUS; SUBCUTANEOUS at 03:57

## 2022-01-01 RX ADMIN — Medication 500000 UNIT(S): at 17:49

## 2022-01-01 RX ADMIN — FOSPHENYTOIN 108 MILLIGRAM(S) PE: 50 INJECTION INTRAMUSCULAR; INTRAVENOUS at 10:03

## 2022-01-01 RX ADMIN — ENOXAPARIN SODIUM 60 MILLIGRAM(S): 100 INJECTION SUBCUTANEOUS at 20:25

## 2022-01-01 RX ADMIN — PANTOPRAZOLE SODIUM 40 MILLIGRAM(S): 20 TABLET, DELAYED RELEASE ORAL at 12:09

## 2022-01-01 RX ADMIN — Medication 50 MILLIGRAM(S): at 05:34

## 2022-01-01 RX ADMIN — ENOXAPARIN SODIUM 60 MILLIGRAM(S): 100 INJECTION SUBCUTANEOUS at 05:02

## 2022-01-01 RX ADMIN — SIROLIMUS 1 MILLIGRAM(S): 1 SOLUTION ORAL at 14:42

## 2022-01-01 RX ADMIN — Medication 5 MILLIGRAM(S): at 06:54

## 2022-01-01 RX ADMIN — DIVALPROEX SODIUM 500 MILLIGRAM(S): 500 TABLET, DELAYED RELEASE ORAL at 08:30

## 2022-01-01 RX ADMIN — INSULIN GLARGINE 10 UNIT(S): 100 INJECTION, SOLUTION SUBCUTANEOUS at 21:17

## 2022-01-01 RX ADMIN — Medication 667 MILLIGRAM(S): at 08:02

## 2022-01-01 RX ADMIN — Medication 650 MILLIGRAM(S): at 21:51

## 2022-01-01 RX ADMIN — CARVEDILOL PHOSPHATE 12.5 MILLIGRAM(S): 80 CAPSULE, EXTENDED RELEASE ORAL at 17:44

## 2022-01-01 RX ADMIN — Medication 1300 MILLIGRAM(S): at 17:47

## 2022-01-01 RX ADMIN — Medication 1300 MILLIGRAM(S): at 05:56

## 2022-01-01 RX ADMIN — Medication 50 MICROGRAM(S): at 05:41

## 2022-01-01 RX ADMIN — Medication 650 MILLIGRAM(S): at 06:52

## 2022-01-01 RX ADMIN — FINASTERIDE 5 MILLIGRAM(S): 5 TABLET, FILM COATED ORAL at 12:16

## 2022-01-01 RX ADMIN — PANTOPRAZOLE SODIUM 40 MILLIGRAM(S): 20 TABLET, DELAYED RELEASE ORAL at 08:44

## 2022-01-01 RX ADMIN — SEVELAMER CARBONATE 800 MILLIGRAM(S): 2400 POWDER, FOR SUSPENSION ORAL at 17:37

## 2022-01-01 RX ADMIN — FINASTERIDE 5 MILLIGRAM(S): 5 TABLET, FILM COATED ORAL at 11:47

## 2022-01-01 RX ADMIN — Medication 6 MILLIGRAM(S): at 22:22

## 2022-01-01 RX ADMIN — CHLORHEXIDINE GLUCONATE 1 APPLICATION(S): 213 SOLUTION TOPICAL at 05:26

## 2022-01-01 RX ADMIN — HEPARIN SODIUM 9 UNIT(S)/HR: 5000 INJECTION INTRAVENOUS; SUBCUTANEOUS at 23:07

## 2022-01-01 RX ADMIN — Medication 7 UNIT(S): at 09:20

## 2022-01-01 RX ADMIN — SIROLIMUS 6 MILLIGRAM(S): 1 SOLUTION ORAL at 08:32

## 2022-01-01 RX ADMIN — ENOXAPARIN SODIUM 60 MILLIGRAM(S): 100 INJECTION SUBCUTANEOUS at 05:18

## 2022-01-01 RX ADMIN — Medication 500000 UNIT(S): at 05:38

## 2022-01-01 RX ADMIN — INSULIN GLARGINE 7 UNIT(S): 100 INJECTION, SOLUTION SUBCUTANEOUS at 13:07

## 2022-01-01 RX ADMIN — HEPARIN SODIUM 5000 UNIT(S): 5000 INJECTION INTRAVENOUS; SUBCUTANEOUS at 21:08

## 2022-01-01 RX ADMIN — Medication 1 TABLET(S): at 13:21

## 2022-01-01 RX ADMIN — WARFARIN SODIUM 3 MILLIGRAM(S): 2.5 TABLET ORAL at 20:38

## 2022-01-01 RX ADMIN — Medication 20 MILLIGRAM(S): at 05:12

## 2022-01-01 RX ADMIN — Medication 1: at 12:16

## 2022-01-01 RX ADMIN — FOSPHENYTOIN 108 MILLIGRAM(S) PE: 50 INJECTION INTRAMUSCULAR; INTRAVENOUS at 06:25

## 2022-01-01 RX ADMIN — Medication 100 MILLIGRAM(S): at 05:43

## 2022-01-01 RX ADMIN — Medication 2 DROP(S): at 17:40

## 2022-01-01 RX ADMIN — SIROLIMUS 7 MILLIGRAM(S): 1 SOLUTION ORAL at 08:38

## 2022-01-01 RX ADMIN — POLYETHYLENE GLYCOL 3350 17 GRAM(S): 17 POWDER, FOR SOLUTION ORAL at 12:21

## 2022-01-01 RX ADMIN — Medication 500000 UNIT(S): at 13:20

## 2022-01-01 RX ADMIN — Medication 1 TABLET(S): at 12:09

## 2022-01-01 RX ADMIN — CHLORHEXIDINE GLUCONATE 1 APPLICATION(S): 213 SOLUTION TOPICAL at 08:41

## 2022-01-01 RX ADMIN — HEPARIN SODIUM 5000 UNIT(S): 5000 INJECTION INTRAVENOUS; SUBCUTANEOUS at 17:50

## 2022-01-01 RX ADMIN — Medication 1300 MILLIGRAM(S): at 14:06

## 2022-01-01 RX ADMIN — PANTOPRAZOLE SODIUM 40 MILLIGRAM(S): 20 TABLET, DELAYED RELEASE ORAL at 05:34

## 2022-01-01 RX ADMIN — BRIVARACETAM 50 MILLIGRAM(S): 25 TABLET, FILM COATED ORAL at 17:20

## 2022-01-01 RX ADMIN — Medication 60 MILLIGRAM(S): at 13:48

## 2022-01-01 RX ADMIN — Medication 200 MILLIGRAM(S): at 17:30

## 2022-01-01 RX ADMIN — Medication 100 MILLIGRAM(S): at 21:21

## 2022-01-01 RX ADMIN — MAGNESIUM OXIDE 400 MG ORAL TABLET 400 MILLIGRAM(S): 241.3 TABLET ORAL at 22:08

## 2022-01-01 RX ADMIN — MYCOPHENOLATE MOFETIL 500 MILLIGRAM(S): 250 CAPSULE ORAL at 08:19

## 2022-01-01 RX ADMIN — LEVETIRACETAM 250 MILLIGRAM(S): 250 TABLET, FILM COATED ORAL at 18:41

## 2022-01-01 RX ADMIN — CARVEDILOL PHOSPHATE 25 MILLIGRAM(S): 80 CAPSULE, EXTENDED RELEASE ORAL at 05:32

## 2022-01-01 RX ADMIN — Medication 75 MILLIGRAM(S): at 13:22

## 2022-01-01 RX ADMIN — ENOXAPARIN SODIUM 60 MILLIGRAM(S): 100 INJECTION SUBCUTANEOUS at 18:02

## 2022-01-01 RX ADMIN — HYDROMORPHONE HYDROCHLORIDE 0.5 MILLIGRAM(S): 2 INJECTION INTRAMUSCULAR; INTRAVENOUS; SUBCUTANEOUS at 22:21

## 2022-01-01 RX ADMIN — Medication 4 UNIT(S): at 13:33

## 2022-01-01 RX ADMIN — SIROLIMUS 0.5 MILLIGRAM(S): 1 SOLUTION ORAL at 12:51

## 2022-01-01 RX ADMIN — BRIVARACETAM 50 MILLIGRAM(S): 25 TABLET, FILM COATED ORAL at 17:10

## 2022-01-01 RX ADMIN — Medication 100 MILLIGRAM(S): at 20:12

## 2022-01-01 RX ADMIN — Medication 60 MILLIGRAM(S): at 05:26

## 2022-01-01 RX ADMIN — Medication 100 MILLIGRAM(S): at 22:15

## 2022-01-01 RX ADMIN — Medication 1 TABLET(S): at 11:48

## 2022-01-01 RX ADMIN — Medication 40 MILLIGRAM(S): at 10:41

## 2022-01-01 RX ADMIN — INSULIN GLARGINE 10 UNIT(S): 100 INJECTION, SOLUTION SUBCUTANEOUS at 22:13

## 2022-01-01 RX ADMIN — SENNA PLUS 2 TABLET(S): 8.6 TABLET ORAL at 20:17

## 2022-01-01 RX ADMIN — Medication 650 MILLIGRAM(S): at 06:22

## 2022-01-01 RX ADMIN — Medication 4 UNIT(S): at 09:33

## 2022-01-01 RX ADMIN — FOSPHENYTOIN 108 MILLIGRAM(S) PE: 50 INJECTION INTRAMUSCULAR; INTRAVENOUS at 21:54

## 2022-01-01 RX ADMIN — Medication 500000 UNIT(S): at 21:41

## 2022-01-01 RX ADMIN — PANTOPRAZOLE SODIUM 40 MILLIGRAM(S): 20 TABLET, DELAYED RELEASE ORAL at 09:02

## 2022-01-01 RX ADMIN — Medication 650 MILLIGRAM(S): at 18:36

## 2022-01-01 RX ADMIN — Medication 500000 UNIT(S): at 18:25

## 2022-01-01 RX ADMIN — FLUCONAZOLE 100 MILLIGRAM(S): 150 TABLET ORAL at 16:15

## 2022-01-01 RX ADMIN — Medication 2: at 12:54

## 2022-01-01 RX ADMIN — Medication 4 UNIT(S): at 18:02

## 2022-01-01 RX ADMIN — Medication 3 UNIT(S): at 13:18

## 2022-01-01 RX ADMIN — CARVEDILOL PHOSPHATE 6.25 MILLIGRAM(S): 80 CAPSULE, EXTENDED RELEASE ORAL at 05:28

## 2022-01-01 RX ADMIN — Medication 55 MILLIGRAM(S): at 05:14

## 2022-01-01 RX ADMIN — Medication 9 UNIT(S): at 13:32

## 2022-01-01 RX ADMIN — Medication 2: at 17:11

## 2022-01-01 RX ADMIN — Medication 1 TABLET(S): at 12:55

## 2022-01-01 RX ADMIN — Medication 2: at 13:04

## 2022-01-01 RX ADMIN — CHLORHEXIDINE GLUCONATE 1 APPLICATION(S): 213 SOLUTION TOPICAL at 05:58

## 2022-01-01 RX ADMIN — Medication 50 MICROGRAM(S): at 05:31

## 2022-01-01 RX ADMIN — LEVETIRACETAM 250 MILLIGRAM(S): 250 TABLET, FILM COATED ORAL at 20:17

## 2022-01-01 RX ADMIN — Medication 9 UNIT(S): at 18:00

## 2022-01-01 RX ADMIN — LEVETIRACETAM 250 MILLIGRAM(S): 250 TABLET, FILM COATED ORAL at 05:55

## 2022-01-01 RX ADMIN — Medication 5 UNIT(S): at 17:37

## 2022-01-01 RX ADMIN — CHLORHEXIDINE GLUCONATE 1 APPLICATION(S): 213 SOLUTION TOPICAL at 08:23

## 2022-01-01 RX ADMIN — BRIVARACETAM 50 MILLIGRAM(S): 25 TABLET, FILM COATED ORAL at 05:57

## 2022-01-01 RX ADMIN — Medication 650 MILLIGRAM(S): at 14:02

## 2022-01-01 RX ADMIN — FENTANYL CITRATE 50 MICROGRAM(S): 50 INJECTION INTRAVENOUS at 01:15

## 2022-01-01 RX ADMIN — TACROLIMUS 0.5 MILLIGRAM(S): 5 CAPSULE ORAL at 19:44

## 2022-01-01 RX ADMIN — MYCOPHENOLATE MOFETIL 1000 MILLIGRAM(S): 250 CAPSULE ORAL at 22:30

## 2022-01-01 RX ADMIN — Medication 3 UNIT(S): at 11:28

## 2022-01-01 RX ADMIN — Medication 1: at 07:00

## 2022-01-01 RX ADMIN — POLYETHYLENE GLYCOL 3350 17 GRAM(S): 17 POWDER, FOR SOLUTION ORAL at 13:17

## 2022-01-01 RX ADMIN — CHLORHEXIDINE GLUCONATE 1 APPLICATION(S): 213 SOLUTION TOPICAL at 05:53

## 2022-01-01 RX ADMIN — PANTOPRAZOLE SODIUM 40 MILLIGRAM(S): 20 TABLET, DELAYED RELEASE ORAL at 09:13

## 2022-01-01 RX ADMIN — AMLODIPINE BESYLATE 10 MILLIGRAM(S): 2.5 TABLET ORAL at 06:29

## 2022-01-01 RX ADMIN — CHLORHEXIDINE GLUCONATE 1 APPLICATION(S): 213 SOLUTION TOPICAL at 08:14

## 2022-01-01 RX ADMIN — VALGANCICLOVIR 450 MILLIGRAM(S): 450 TABLET, FILM COATED ORAL at 07:42

## 2022-01-01 RX ADMIN — Medication 500000 UNIT(S): at 21:56

## 2022-01-01 RX ADMIN — MAGNESIUM OXIDE 400 MG ORAL TABLET 400 MILLIGRAM(S): 241.3 TABLET ORAL at 21:05

## 2022-01-01 RX ADMIN — SODIUM CHLORIDE 50 MILLILITER(S): 9 INJECTION, SOLUTION INTRAVENOUS at 20:53

## 2022-01-01 RX ADMIN — Medication 1: at 17:22

## 2022-01-01 RX ADMIN — CARVEDILOL PHOSPHATE 12.5 MILLIGRAM(S): 80 CAPSULE, EXTENDED RELEASE ORAL at 06:14

## 2022-01-01 RX ADMIN — Medication 4 UNIT(S): at 08:50

## 2022-01-01 RX ADMIN — Medication 10 UNIT(S): at 08:36

## 2022-01-01 RX ADMIN — Medication 650 MILLIGRAM(S): at 09:48

## 2022-01-01 RX ADMIN — Medication 75 MILLIGRAM(S): at 05:47

## 2022-01-01 RX ADMIN — Medication 80 MILLIGRAM(S): at 05:44

## 2022-01-01 RX ADMIN — FLUCONAZOLE 200 MILLIGRAM(S): 150 TABLET ORAL at 11:37

## 2022-01-01 RX ADMIN — INSULIN GLARGINE 10 UNIT(S): 100 INJECTION, SOLUTION SUBCUTANEOUS at 21:36

## 2022-01-01 RX ADMIN — Medication 650 MILLIGRAM(S): at 06:01

## 2022-01-01 RX ADMIN — INSULIN GLARGINE 6 UNIT(S): 100 INJECTION, SOLUTION SUBCUTANEOUS at 21:10

## 2022-01-01 RX ADMIN — Medication 1: at 05:15

## 2022-01-01 RX ADMIN — INSULIN GLARGINE 10 UNIT(S): 100 INJECTION, SOLUTION SUBCUTANEOUS at 21:55

## 2022-01-01 RX ADMIN — Medication 2 DROP(S): at 17:29

## 2022-01-01 RX ADMIN — MAGNESIUM OXIDE 400 MG ORAL TABLET 400 MILLIGRAM(S): 241.3 TABLET ORAL at 17:10

## 2022-01-01 RX ADMIN — SODIUM CHLORIDE 125 MILLILITER(S): 9 INJECTION INTRAMUSCULAR; INTRAVENOUS; SUBCUTANEOUS at 09:46

## 2022-01-01 RX ADMIN — ERTAPENEM SODIUM 120 MILLIGRAM(S): 1 INJECTION, POWDER, LYOPHILIZED, FOR SOLUTION INTRAMUSCULAR; INTRAVENOUS at 19:02

## 2022-01-01 RX ADMIN — ATORVASTATIN CALCIUM 80 MILLIGRAM(S): 80 TABLET, FILM COATED ORAL at 21:28

## 2022-01-01 RX ADMIN — Medication 1: at 17:07

## 2022-01-01 RX ADMIN — BRIVARACETAM 220 MILLIGRAM(S): 25 TABLET, FILM COATED ORAL at 23:34

## 2022-01-01 RX ADMIN — Medication 1: at 17:41

## 2022-01-01 RX ADMIN — PANTOPRAZOLE SODIUM 40 MILLIGRAM(S): 20 TABLET, DELAYED RELEASE ORAL at 12:46

## 2022-01-01 RX ADMIN — PANTOPRAZOLE SODIUM 40 MILLIGRAM(S): 20 TABLET, DELAYED RELEASE ORAL at 11:36

## 2022-01-01 RX ADMIN — PANTOPRAZOLE SODIUM 40 MILLIGRAM(S): 20 TABLET, DELAYED RELEASE ORAL at 06:01

## 2022-01-01 RX ADMIN — DEXMEDETOMIDINE HYDROCHLORIDE IN 0.9% SODIUM CHLORIDE 1.54 MICROGRAM(S)/KG/HR: 4 INJECTION INTRAVENOUS at 23:09

## 2022-01-01 RX ADMIN — Medication 100 MILLIGRAM(S): at 05:23

## 2022-01-01 RX ADMIN — Medication 3 UNIT(S): at 17:06

## 2022-01-01 RX ADMIN — MYCOPHENOLATE MOFETIL 500 MILLIGRAM(S): 250 CAPSULE ORAL at 06:12

## 2022-01-01 RX ADMIN — LEVETIRACETAM 250 MILLIGRAM(S): 250 TABLET, FILM COATED ORAL at 05:26

## 2022-01-01 RX ADMIN — Medication 80 MILLIGRAM(S): at 11:28

## 2022-01-01 RX ADMIN — MYCOPHENOLATE MOFETIL 1000 MILLIGRAM(S): 250 CAPSULE ORAL at 21:35

## 2022-01-01 RX ADMIN — Medication 2: at 18:25

## 2022-01-01 RX ADMIN — PANTOPRAZOLE SODIUM 40 MILLIGRAM(S): 20 TABLET, DELAYED RELEASE ORAL at 13:32

## 2022-01-01 RX ADMIN — Medication 500000 UNIT(S): at 05:36

## 2022-01-01 RX ADMIN — CARVEDILOL PHOSPHATE 6.25 MILLIGRAM(S): 80 CAPSULE, EXTENDED RELEASE ORAL at 05:57

## 2022-01-01 RX ADMIN — HEPARIN SODIUM 5000 UNIT(S): 5000 INJECTION INTRAVENOUS; SUBCUTANEOUS at 06:30

## 2022-01-01 RX ADMIN — MYCOPHENOLATE MOFETIL 500 MILLIGRAM(S): 250 CAPSULE ORAL at 08:25

## 2022-01-01 RX ADMIN — FLUCONAZOLE 100 MILLIGRAM(S): 150 TABLET ORAL at 07:47

## 2022-01-01 RX ADMIN — PANTOPRAZOLE SODIUM 40 MILLIGRAM(S): 20 TABLET, DELAYED RELEASE ORAL at 06:03

## 2022-01-01 RX ADMIN — Medication 4: at 17:24

## 2022-01-01 RX ADMIN — BRIVARACETAM 50 MILLIGRAM(S): 25 TABLET, FILM COATED ORAL at 21:24

## 2022-01-01 RX ADMIN — MYCOPHENOLATE MOFETIL 500 MILLIGRAM(S): 250 CAPSULE ORAL at 07:33

## 2022-01-01 RX ADMIN — Medication 200 MILLIGRAM(S): at 17:56

## 2022-01-01 RX ADMIN — CEFEPIME 100 MILLIGRAM(S): 1 INJECTION, POWDER, FOR SOLUTION INTRAMUSCULAR; INTRAVENOUS at 03:55

## 2022-01-01 RX ADMIN — Medication 4 MILLIGRAM(S): at 17:51

## 2022-01-01 RX ADMIN — Medication 1: at 12:24

## 2022-01-01 RX ADMIN — POLYETHYLENE GLYCOL 3350 17 GRAM(S): 17 POWDER, FOR SOLUTION ORAL at 12:00

## 2022-01-01 RX ADMIN — Medication 200 MILLIGRAM(S): at 18:13

## 2022-01-01 RX ADMIN — FINASTERIDE 5 MILLIGRAM(S): 5 TABLET, FILM COATED ORAL at 11:41

## 2022-01-01 RX ADMIN — Medication 500000 UNIT(S): at 11:42

## 2022-01-01 RX ADMIN — PANTOPRAZOLE SODIUM 40 MILLIGRAM(S): 20 TABLET, DELAYED RELEASE ORAL at 07:39

## 2022-01-01 RX ADMIN — Medication 2 DROP(S): at 14:41

## 2022-01-01 RX ADMIN — Medication 2: at 13:07

## 2022-01-01 RX ADMIN — TRAMADOL HYDROCHLORIDE 25 MILLIGRAM(S): 50 TABLET ORAL at 01:28

## 2022-01-01 RX ADMIN — Medication 50 MILLIGRAM(S): at 13:09

## 2022-01-01 RX ADMIN — Medication 500000 UNIT(S): at 23:24

## 2022-01-01 RX ADMIN — POLYETHYLENE GLYCOL 3350 17 GRAM(S): 17 POWDER, FOR SOLUTION ORAL at 11:09

## 2022-01-01 RX ADMIN — DIVALPROEX SODIUM 250 MILLIGRAM(S): 500 TABLET, DELAYED RELEASE ORAL at 11:50

## 2022-01-01 RX ADMIN — Medication 4 MILLIGRAM(S): at 17:18

## 2022-01-01 RX ADMIN — Medication 650 MILLIGRAM(S): at 09:23

## 2022-01-01 RX ADMIN — BRIVARACETAM 50 MILLIGRAM(S): 25 TABLET, FILM COATED ORAL at 05:16

## 2022-01-01 RX ADMIN — Medication 255 MILLIMOLE(S): at 08:55

## 2022-01-01 RX ADMIN — Medication 5 MILLIGRAM(S): at 05:55

## 2022-01-01 RX ADMIN — Medication 4: at 10:36

## 2022-01-01 RX ADMIN — SEVELAMER CARBONATE 800 MILLIGRAM(S): 2400 POWDER, FOR SUSPENSION ORAL at 11:31

## 2022-01-01 RX ADMIN — Medication 1 TABLET(S): at 12:36

## 2022-01-01 RX ADMIN — MYCOPHENOLATE MOFETIL 500 MILLIGRAM(S): 250 CAPSULE ORAL at 21:01

## 2022-01-01 RX ADMIN — Medication 4 UNIT(S): at 09:22

## 2022-01-01 RX ADMIN — Medication 60 MILLIGRAM(S): at 05:19

## 2022-01-01 RX ADMIN — Medication 3 UNIT(S): at 08:10

## 2022-01-01 RX ADMIN — HEPARIN SODIUM 5000 UNIT(S): 5000 INJECTION INTRAVENOUS; SUBCUTANEOUS at 13:29

## 2022-01-01 RX ADMIN — TACROLIMUS 1 MILLIGRAM(S): 5 CAPSULE ORAL at 14:40

## 2022-01-01 RX ADMIN — DESMOPRESSIN ACETATE 220 MICROGRAM(S): 0.1 TABLET ORAL at 12:27

## 2022-01-01 RX ADMIN — Medication 3 UNIT(S): at 12:21

## 2022-01-01 RX ADMIN — FOSPHENYTOIN 108 MILLIGRAM(S) PE: 50 INJECTION INTRAMUSCULAR; INTRAVENOUS at 17:47

## 2022-01-01 RX ADMIN — Medication 4 MILLIGRAM(S): at 05:06

## 2022-01-01 RX ADMIN — Medication 2 MILLIGRAM(S): at 15:55

## 2022-01-01 RX ADMIN — Medication 5 MILLIGRAM(S): at 04:21

## 2022-01-01 RX ADMIN — PANTOPRAZOLE SODIUM 40 MILLIGRAM(S): 20 TABLET, DELAYED RELEASE ORAL at 05:26

## 2022-01-01 RX ADMIN — Medication 500000 UNIT(S): at 05:21

## 2022-01-01 RX ADMIN — APIXABAN 2.5 MILLIGRAM(S): 2.5 TABLET, FILM COATED ORAL at 11:01

## 2022-01-01 RX ADMIN — Medication 6 MILLIGRAM(S): at 21:27

## 2022-01-01 RX ADMIN — Medication 1 TABLET(S): at 11:42

## 2022-01-01 RX ADMIN — Medication 2 DROP(S): at 14:03

## 2022-01-01 RX ADMIN — MYCOPHENOLATE MOFETIL 1000 MILLIGRAM(S): 250 CAPSULE ORAL at 08:25

## 2022-01-01 RX ADMIN — Medication 4 UNIT(S): at 13:38

## 2022-01-01 RX ADMIN — Medication 667 MILLIGRAM(S): at 17:07

## 2022-01-01 RX ADMIN — Medication 500 MILLIGRAM(S): at 18:29

## 2022-01-01 RX ADMIN — CEFEPIME 100 MILLIGRAM(S): 1 INJECTION, POWDER, FOR SOLUTION INTRAMUSCULAR; INTRAVENOUS at 06:08

## 2022-01-01 RX ADMIN — Medication 500000 UNIT(S): at 11:59

## 2022-01-01 RX ADMIN — Medication 5 MILLIGRAM(S): at 05:35

## 2022-01-01 RX ADMIN — Medication 500000 UNIT(S): at 17:22

## 2022-01-01 RX ADMIN — FOSPHENYTOIN 102 MILLIGRAM(S) PE: 50 INJECTION INTRAMUSCULAR; INTRAVENOUS at 07:45

## 2022-01-01 RX ADMIN — Medication 650 MILLIGRAM(S): at 23:30

## 2022-01-01 RX ADMIN — Medication 2: at 12:45

## 2022-01-01 RX ADMIN — Medication 2: at 09:06

## 2022-01-01 RX ADMIN — Medication 4: at 17:47

## 2022-01-01 RX ADMIN — Medication 500000 UNIT(S): at 12:34

## 2022-01-01 RX ADMIN — Medication 3: at 13:03

## 2022-01-01 RX ADMIN — WARFARIN SODIUM 5 MILLIGRAM(S): 2.5 TABLET ORAL at 21:45

## 2022-01-01 RX ADMIN — MAGNESIUM OXIDE 400 MG ORAL TABLET 400 MILLIGRAM(S): 241.3 TABLET ORAL at 13:05

## 2022-01-01 RX ADMIN — FINASTERIDE 5 MILLIGRAM(S): 5 TABLET, FILM COATED ORAL at 12:41

## 2022-01-01 RX ADMIN — Medication 4 MILLIGRAM(S): at 17:07

## 2022-01-01 RX ADMIN — Medication 40 MICROGRAM(S): at 21:26

## 2022-01-01 RX ADMIN — SODIUM ZIRCONIUM CYCLOSILICATE 10 GRAM(S): 10 POWDER, FOR SUSPENSION ORAL at 10:29

## 2022-01-01 RX ADMIN — Medication 250 MILLIGRAM(S): at 13:15

## 2022-01-01 RX ADMIN — Medication 8 UNIT(S): at 17:27

## 2022-01-01 RX ADMIN — CARVEDILOL PHOSPHATE 12.5 MILLIGRAM(S): 80 CAPSULE, EXTENDED RELEASE ORAL at 05:59

## 2022-01-01 RX ADMIN — POLYETHYLENE GLYCOL 3350 17 GRAM(S): 17 POWDER, FOR SOLUTION ORAL at 12:13

## 2022-01-01 RX ADMIN — Medication 1 TABLET(S): at 12:56

## 2022-01-01 RX ADMIN — Medication 5 MILLIGRAM(S): at 05:51

## 2022-01-01 RX ADMIN — SODIUM ZIRCONIUM CYCLOSILICATE 10 GRAM(S): 10 POWDER, FOR SUSPENSION ORAL at 20:44

## 2022-01-01 RX ADMIN — Medication 1: at 19:16

## 2022-01-01 RX ADMIN — ERTAPENEM SODIUM 100 MILLIGRAM(S): 1 INJECTION, POWDER, LYOPHILIZED, FOR SOLUTION INTRAMUSCULAR; INTRAVENOUS at 19:59

## 2022-01-01 RX ADMIN — Medication 25 MILLIGRAM(S): at 22:09

## 2022-01-01 RX ADMIN — Medication 5 MILLIGRAM(S): at 05:36

## 2022-01-01 RX ADMIN — Medication 4 MILLIGRAM(S): at 16:55

## 2022-01-01 RX ADMIN — Medication 1 UNIT(S): at 17:40

## 2022-01-01 RX ADMIN — Medication 50 MILLILITER(S): at 21:06

## 2022-01-01 RX ADMIN — Medication 2 DROP(S): at 17:22

## 2022-01-01 RX ADMIN — FOSPHENYTOIN 104 MILLIGRAM(S) PE: 50 INJECTION INTRAMUSCULAR; INTRAVENOUS at 00:14

## 2022-01-01 RX ADMIN — Medication 4 UNIT(S): at 17:43

## 2022-01-01 RX ADMIN — Medication 8 UNIT(S): at 18:20

## 2022-01-01 RX ADMIN — TACROLIMUS 1 MILLIGRAM(S): 5 CAPSULE ORAL at 15:28

## 2022-01-01 RX ADMIN — Medication 8 UNIT(S): at 13:04

## 2022-01-01 RX ADMIN — ERYTHROPOIETIN 10000 UNIT(S): 10000 INJECTION, SOLUTION INTRAVENOUS; SUBCUTANEOUS at 16:32

## 2022-01-01 RX ADMIN — MEROPENEM 100 MILLIGRAM(S): 1 INJECTION INTRAVENOUS at 05:04

## 2022-01-01 RX ADMIN — Medication 500000 UNIT(S): at 23:03

## 2022-01-01 RX ADMIN — FINASTERIDE 5 MILLIGRAM(S): 5 TABLET, FILM COATED ORAL at 11:35

## 2022-01-01 RX ADMIN — ERYTHROPOIETIN 10000 UNIT(S): 10000 INJECTION, SOLUTION INTRAVENOUS; SUBCUTANEOUS at 18:49

## 2022-01-01 RX ADMIN — Medication 4 UNIT(S): at 13:16

## 2022-01-01 RX ADMIN — Medication 1 GRAM(S): at 18:24

## 2022-01-01 RX ADMIN — CARVEDILOL PHOSPHATE 12.5 MILLIGRAM(S): 80 CAPSULE, EXTENDED RELEASE ORAL at 05:14

## 2022-01-01 RX ADMIN — Medication 50 MILLIGRAM(S): at 06:45

## 2022-01-01 RX ADMIN — ATORVASTATIN CALCIUM 40 MILLIGRAM(S): 80 TABLET, FILM COATED ORAL at 22:10

## 2022-01-01 RX ADMIN — Medication 1: at 15:30

## 2022-01-01 RX ADMIN — CARVEDILOL PHOSPHATE 3.12 MILLIGRAM(S): 80 CAPSULE, EXTENDED RELEASE ORAL at 09:33

## 2022-01-01 RX ADMIN — CHLORHEXIDINE GLUCONATE 1 APPLICATION(S): 213 SOLUTION TOPICAL at 12:07

## 2022-01-01 RX ADMIN — FLUCONAZOLE 100 MILLIGRAM(S): 150 TABLET ORAL at 08:12

## 2022-01-01 RX ADMIN — ERYTHROPOIETIN 10000 UNIT(S): 10000 INJECTION, SOLUTION INTRAVENOUS; SUBCUTANEOUS at 14:26

## 2022-01-01 RX ADMIN — LEVETIRACETAM 400 MILLIGRAM(S): 250 TABLET, FILM COATED ORAL at 17:40

## 2022-01-01 RX ADMIN — FOSPHENYTOIN 104 MILLIGRAM(S) PE: 50 INJECTION INTRAMUSCULAR; INTRAVENOUS at 20:34

## 2022-01-01 RX ADMIN — INSULIN GLARGINE 4 UNIT(S): 100 INJECTION, SOLUTION SUBCUTANEOUS at 22:04

## 2022-01-01 RX ADMIN — Medication 100 MILLIGRAM(S): at 21:25

## 2022-01-01 RX ADMIN — Medication 4 MILLIGRAM(S): at 05:12

## 2022-01-01 RX ADMIN — Medication 9 UNIT(S): at 09:02

## 2022-01-01 RX ADMIN — APIXABAN 2.5 MILLIGRAM(S): 2.5 TABLET, FILM COATED ORAL at 18:41

## 2022-01-01 RX ADMIN — CARVEDILOL PHOSPHATE 12.5 MILLIGRAM(S): 80 CAPSULE, EXTENDED RELEASE ORAL at 06:56

## 2022-01-01 RX ADMIN — Medication 4 MILLIGRAM(S): at 06:33

## 2022-01-01 RX ADMIN — Medication 5 UNIT(S): at 18:17

## 2022-01-01 RX ADMIN — Medication 50 MICROGRAM(S): at 05:46

## 2022-01-01 RX ADMIN — Medication 10 MILLIGRAM(S): at 11:41

## 2022-01-01 RX ADMIN — Medication 50 MILLILITER(S): at 03:39

## 2022-01-01 RX ADMIN — SODIUM ZIRCONIUM CYCLOSILICATE 10 GRAM(S): 10 POWDER, FOR SUSPENSION ORAL at 05:38

## 2022-01-01 RX ADMIN — Medication 100 MILLIGRAM(S): at 15:15

## 2022-01-01 RX ADMIN — Medication 1300 MILLIGRAM(S): at 17:17

## 2022-01-01 RX ADMIN — Medication 5 UNIT(S): at 12:21

## 2022-01-01 RX ADMIN — Medication 100 MILLIGRAM(S): at 22:06

## 2022-01-01 RX ADMIN — Medication 50 MILLIGRAM(S): at 20:18

## 2022-01-01 RX ADMIN — PANTOPRAZOLE SODIUM 40 MILLIGRAM(S): 20 TABLET, DELAYED RELEASE ORAL at 12:36

## 2022-01-01 RX ADMIN — TACROLIMUS 3 MILLIGRAM(S): 5 CAPSULE ORAL at 12:55

## 2022-01-01 RX ADMIN — Medication 9 UNIT(S): at 18:24

## 2022-01-01 RX ADMIN — Medication 4 MILLIGRAM(S): at 06:45

## 2022-01-01 RX ADMIN — Medication 500000 UNIT(S): at 13:04

## 2022-01-01 RX ADMIN — POLYETHYLENE GLYCOL 3350 17 GRAM(S): 17 POWDER, FOR SOLUTION ORAL at 11:18

## 2022-01-01 RX ADMIN — FINASTERIDE 5 MILLIGRAM(S): 5 TABLET, FILM COATED ORAL at 12:13

## 2022-01-01 RX ADMIN — Medication 5 UNIT(S): at 12:07

## 2022-01-01 RX ADMIN — AMLODIPINE BESYLATE 10 MILLIGRAM(S): 2.5 TABLET ORAL at 07:59

## 2022-01-01 RX ADMIN — INSULIN HUMAN 10 UNIT(S): 100 INJECTION, SOLUTION SUBCUTANEOUS at 20:34

## 2022-01-01 RX ADMIN — ENOXAPARIN SODIUM 60 MILLIGRAM(S): 100 INJECTION SUBCUTANEOUS at 05:25

## 2022-01-01 RX ADMIN — Medication 50 MICROGRAM(S): at 06:29

## 2022-01-01 RX ADMIN — FOSPHENYTOIN 103 MILLIGRAM(S) PE: 50 INJECTION INTRAMUSCULAR; INTRAVENOUS at 01:29

## 2022-01-01 RX ADMIN — Medication 6: at 17:18

## 2022-01-01 RX ADMIN — MYCOPHENOLATE MOFETIL 250 MILLIGRAM(S): 250 CAPSULE ORAL at 20:14

## 2022-01-01 RX ADMIN — PANTOPRAZOLE SODIUM 40 MILLIGRAM(S): 20 TABLET, DELAYED RELEASE ORAL at 12:28

## 2022-01-01 RX ADMIN — CARVEDILOL PHOSPHATE 25 MILLIGRAM(S): 80 CAPSULE, EXTENDED RELEASE ORAL at 19:24

## 2022-01-01 RX ADMIN — Medication 6 MILLIGRAM(S): at 22:09

## 2022-01-01 RX ADMIN — Medication 4 MILLIGRAM(S): at 05:26

## 2022-01-01 RX ADMIN — Medication 40 MICROGRAM(S): at 23:07

## 2022-01-01 RX ADMIN — Medication 25 MILLIGRAM(S): at 04:29

## 2022-01-01 RX ADMIN — Medication 75 MILLIGRAM(S): at 17:50

## 2022-01-01 RX ADMIN — Medication 10 MILLIGRAM(S): at 06:45

## 2022-01-01 RX ADMIN — Medication 2: at 14:55

## 2022-01-01 RX ADMIN — SODIUM CHLORIDE 75 MILLILITER(S): 9 INJECTION, SOLUTION INTRAVENOUS at 07:18

## 2022-01-01 RX ADMIN — Medication 4: at 08:38

## 2022-01-01 RX ADMIN — TACROLIMUS 1 MILLIGRAM(S): 5 CAPSULE ORAL at 14:32

## 2022-01-01 RX ADMIN — MYCOPHENOLATE MOFETIL 1 GRAM(S): 250 CAPSULE ORAL at 20:08

## 2022-01-01 RX ADMIN — ATORVASTATIN CALCIUM 40 MILLIGRAM(S): 80 TABLET, FILM COATED ORAL at 22:51

## 2022-01-01 RX ADMIN — Medication 4 MILLIGRAM(S): at 18:29

## 2022-01-01 RX ADMIN — Medication 6 MILLIGRAM(S): at 22:06

## 2022-01-01 RX ADMIN — SENNA PLUS 2 TABLET(S): 8.6 TABLET ORAL at 21:22

## 2022-01-01 RX ADMIN — Medication 400 MILLIGRAM(S): at 08:10

## 2022-01-01 RX ADMIN — FOSPHENYTOIN 104 MILLIGRAM(S) PE: 50 INJECTION INTRAMUSCULAR; INTRAVENOUS at 17:20

## 2022-01-01 RX ADMIN — INSULIN GLARGINE 6 UNIT(S): 100 INJECTION, SOLUTION SUBCUTANEOUS at 22:41

## 2022-01-01 RX ADMIN — Medication 1: at 21:40

## 2022-01-01 RX ADMIN — Medication 2 DROP(S): at 05:12

## 2022-01-01 RX ADMIN — SEVELAMER CARBONATE 800 MILLIGRAM(S): 2400 POWDER, FOR SUSPENSION ORAL at 17:30

## 2022-01-01 RX ADMIN — CHLORHEXIDINE GLUCONATE 1 APPLICATION(S): 213 SOLUTION TOPICAL at 05:12

## 2022-01-01 RX ADMIN — Medication 55 MILLIGRAM(S): at 21:11

## 2022-01-01 RX ADMIN — MYCOPHENOLATE MOFETIL 250 MILLIGRAM(S): 250 CAPSULE ORAL at 19:32

## 2022-01-01 RX ADMIN — INSULIN GLARGINE 5 UNIT(S): 100 INJECTION, SOLUTION SUBCUTANEOUS at 21:38

## 2022-01-01 RX ADMIN — Medication 1 TABLET(S): at 12:02

## 2022-01-01 RX ADMIN — ENOXAPARIN SODIUM 60 MILLIGRAM(S): 100 INJECTION SUBCUTANEOUS at 20:29

## 2022-01-01 RX ADMIN — Medication 25 MILLIGRAM(S): at 21:15

## 2022-01-01 RX ADMIN — LEVETIRACETAM 125 MILLIGRAM(S): 250 TABLET, FILM COATED ORAL at 14:06

## 2022-01-01 RX ADMIN — FINASTERIDE 5 MILLIGRAM(S): 5 TABLET, FILM COATED ORAL at 11:29

## 2022-01-01 RX ADMIN — Medication 50 MICROGRAM(S): at 05:20

## 2022-01-01 RX ADMIN — SIROLIMUS 6 MILLIGRAM(S): 1 SOLUTION ORAL at 09:19

## 2022-01-01 RX ADMIN — FINASTERIDE 5 MILLIGRAM(S): 5 TABLET, FILM COATED ORAL at 13:18

## 2022-01-01 RX ADMIN — CHLORHEXIDINE GLUCONATE 1 APPLICATION(S): 213 SOLUTION TOPICAL at 06:57

## 2022-01-01 RX ADMIN — PANTOPRAZOLE SODIUM 40 MILLIGRAM(S): 20 TABLET, DELAYED RELEASE ORAL at 08:48

## 2022-01-01 RX ADMIN — AMLODIPINE BESYLATE 10 MILLIGRAM(S): 2.5 TABLET ORAL at 05:48

## 2022-01-01 RX ADMIN — HEPARIN SODIUM 11 UNIT(S)/HR: 5000 INJECTION INTRAVENOUS; SUBCUTANEOUS at 22:35

## 2022-01-01 RX ADMIN — Medication 80 MILLIGRAM(S): at 05:05

## 2022-01-01 RX ADMIN — Medication 25 GRAM(S): at 10:33

## 2022-01-01 RX ADMIN — AMLODIPINE BESYLATE 10 MILLIGRAM(S): 2.5 TABLET ORAL at 05:18

## 2022-01-01 RX ADMIN — HEPARIN SODIUM 5000 UNIT(S): 5000 INJECTION INTRAVENOUS; SUBCUTANEOUS at 05:34

## 2022-01-01 RX ADMIN — Medication 7 UNIT(S): at 13:24

## 2022-01-01 RX ADMIN — CHLORHEXIDINE GLUCONATE 1 APPLICATION(S): 213 SOLUTION TOPICAL at 09:03

## 2022-01-01 RX ADMIN — MYCOPHENOLATE MOFETIL 500 MILLIGRAM(S): 250 CAPSULE ORAL at 20:34

## 2022-01-01 RX ADMIN — ENOXAPARIN SODIUM 60 MILLIGRAM(S): 100 INJECTION SUBCUTANEOUS at 21:35

## 2022-01-01 RX ADMIN — FOSPHENYTOIN 104 MILLIGRAM(S) PE: 50 INJECTION INTRAMUSCULAR; INTRAVENOUS at 16:51

## 2022-01-01 RX ADMIN — Medication 4: at 06:26

## 2022-01-01 RX ADMIN — Medication 2: at 01:27

## 2022-01-01 RX ADMIN — Medication 500000 UNIT(S): at 06:35

## 2022-01-01 RX ADMIN — ENOXAPARIN SODIUM 60 MILLIGRAM(S): 100 INJECTION SUBCUTANEOUS at 21:11

## 2022-01-01 RX ADMIN — Medication 5 UNIT(S): at 08:59

## 2022-01-01 RX ADMIN — Medication 100 GRAM(S): at 09:02

## 2022-01-01 RX ADMIN — ERTAPENEM SODIUM 100 MILLIGRAM(S): 1 INJECTION, POWDER, LYOPHILIZED, FOR SOLUTION INTRAMUSCULAR; INTRAVENOUS at 15:15

## 2022-01-01 RX ADMIN — Medication 650 MILLIGRAM(S): at 09:50

## 2022-01-01 RX ADMIN — Medication 1300 MILLIGRAM(S): at 06:24

## 2022-01-01 RX ADMIN — Medication 3: at 13:06

## 2022-01-01 RX ADMIN — MAGNESIUM OXIDE 400 MG ORAL TABLET 400 MILLIGRAM(S): 241.3 TABLET ORAL at 07:59

## 2022-01-01 RX ADMIN — DIVALPROEX SODIUM 250 MILLIGRAM(S): 500 TABLET, DELAYED RELEASE ORAL at 11:29

## 2022-01-01 RX ADMIN — Medication 1300 MILLIGRAM(S): at 13:53

## 2022-01-01 RX ADMIN — Medication 200 MILLIGRAM(S): at 06:19

## 2022-01-01 RX ADMIN — Medication 40 MICROGRAM(S): at 21:12

## 2022-01-01 RX ADMIN — PANTOPRAZOLE SODIUM 40 MILLIGRAM(S): 20 TABLET, DELAYED RELEASE ORAL at 12:19

## 2022-01-01 RX ADMIN — CARVEDILOL PHOSPHATE 25 MILLIGRAM(S): 80 CAPSULE, EXTENDED RELEASE ORAL at 05:26

## 2022-01-01 RX ADMIN — SODIUM CHLORIDE 75 MILLILITER(S): 9 INJECTION INTRAMUSCULAR; INTRAVENOUS; SUBCUTANEOUS at 13:10

## 2022-01-01 RX ADMIN — VALGANCICLOVIR 450 MILLIGRAM(S): 450 TABLET, FILM COATED ORAL at 11:48

## 2022-01-01 RX ADMIN — Medication 1: at 13:29

## 2022-01-01 RX ADMIN — MYCOPHENOLATE MOFETIL 500 MILLIGRAM(S): 250 CAPSULE ORAL at 07:58

## 2022-01-01 RX ADMIN — SODIUM POLYSTYRENE SULFONATE 30 GRAM(S): 4.1 POWDER, FOR SUSPENSION ORAL at 22:23

## 2022-01-01 RX ADMIN — VALGANCICLOVIR 450 MILLIGRAM(S): 450 TABLET, FILM COATED ORAL at 08:44

## 2022-01-01 RX ADMIN — MYCOPHENOLATE MOFETIL 250 MILLIGRAM(S): 250 CAPSULE ORAL at 20:00

## 2022-01-01 RX ADMIN — Medication 75 MILLIGRAM(S): at 13:14

## 2022-01-01 RX ADMIN — SIROLIMUS 6 MILLIGRAM(S): 1 SOLUTION ORAL at 08:42

## 2022-01-01 RX ADMIN — Medication 50 MILLIGRAM(S): at 14:35

## 2022-01-01 RX ADMIN — SODIUM CHLORIDE 70 MILLILITER(S): 9 INJECTION INTRAMUSCULAR; INTRAVENOUS; SUBCUTANEOUS at 21:57

## 2022-01-01 RX ADMIN — Medication 25 MILLIGRAM(S): at 05:45

## 2022-01-01 RX ADMIN — HEPARIN SODIUM 5000 UNIT(S): 5000 INJECTION INTRAVENOUS; SUBCUTANEOUS at 18:25

## 2022-01-01 RX ADMIN — LEVETIRACETAM 250 MILLIGRAM(S): 250 TABLET, FILM COATED ORAL at 18:27

## 2022-01-01 RX ADMIN — Medication 500000 UNIT(S): at 22:24

## 2022-01-01 RX ADMIN — MYCOPHENOLATE MOFETIL 1 GRAM(S): 250 CAPSULE ORAL at 08:02

## 2022-01-01 RX ADMIN — CHLORHEXIDINE GLUCONATE 1 APPLICATION(S): 213 SOLUTION TOPICAL at 06:05

## 2022-01-01 RX ADMIN — Medication 60 MILLIGRAM(S): at 05:22

## 2022-01-01 RX ADMIN — CHLORHEXIDINE GLUCONATE 1 APPLICATION(S): 213 SOLUTION TOPICAL at 09:16

## 2022-01-01 RX ADMIN — AMLODIPINE BESYLATE 10 MILLIGRAM(S): 2.5 TABLET ORAL at 08:30

## 2022-01-01 RX ADMIN — Medication 4: at 12:03

## 2022-01-01 RX ADMIN — Medication 1: at 17:39

## 2022-01-01 RX ADMIN — VALGANCICLOVIR 450 MILLIGRAM(S): 450 TABLET, FILM COATED ORAL at 11:44

## 2022-01-01 RX ADMIN — Medication 100 MILLIGRAM(S): at 20:25

## 2022-01-01 RX ADMIN — DIVALPROEX SODIUM 500 MILLIGRAM(S): 500 TABLET, DELAYED RELEASE ORAL at 20:36

## 2022-01-01 RX ADMIN — DIVALPROEX SODIUM 500 MILLIGRAM(S): 500 TABLET, DELAYED RELEASE ORAL at 06:15

## 2022-01-01 RX ADMIN — FOSPHENYTOIN 102 MILLIGRAM(S) PE: 50 INJECTION INTRAMUSCULAR; INTRAVENOUS at 08:57

## 2022-01-01 RX ADMIN — Medication 1300 MILLIGRAM(S): at 14:52

## 2022-01-01 RX ADMIN — Medication 8: at 16:49

## 2022-01-01 RX ADMIN — CHLORHEXIDINE GLUCONATE 15 MILLILITER(S): 213 SOLUTION TOPICAL at 05:05

## 2022-01-01 RX ADMIN — Medication 4 MILLIGRAM(S): at 17:27

## 2022-01-01 RX ADMIN — MYCOPHENOLATE MOFETIL 250 MILLIGRAM(S): 250 CAPSULE ORAL at 19:22

## 2022-01-01 RX ADMIN — PANTOPRAZOLE SODIUM 40 MILLIGRAM(S): 20 TABLET, DELAYED RELEASE ORAL at 06:29

## 2022-01-01 RX ADMIN — SIROLIMUS 6 MILLIGRAM(S): 1 SOLUTION ORAL at 09:55

## 2022-01-01 RX ADMIN — FOSPHENYTOIN 108 MILLIGRAM(S) PE: 50 INJECTION INTRAMUSCULAR; INTRAVENOUS at 05:39

## 2022-01-01 RX ADMIN — SODIUM ZIRCONIUM CYCLOSILICATE 10 GRAM(S): 10 POWDER, FOR SUSPENSION ORAL at 11:39

## 2022-01-01 RX ADMIN — ENOXAPARIN SODIUM 60 MILLIGRAM(S): 100 INJECTION SUBCUTANEOUS at 09:11

## 2022-01-01 RX ADMIN — Medication 2: at 12:20

## 2022-01-01 RX ADMIN — Medication 650 MILLIGRAM(S): at 08:52

## 2022-01-01 RX ADMIN — MYCOPHENOLATE MOFETIL 500 MILLIGRAM(S): 250 CAPSULE ORAL at 08:57

## 2022-01-01 RX ADMIN — PANTOPRAZOLE SODIUM 40 MILLIGRAM(S): 20 TABLET, DELAYED RELEASE ORAL at 05:47

## 2022-01-01 RX ADMIN — Medication 4: at 17:55

## 2022-01-01 RX ADMIN — BRIVARACETAM 50 MILLIGRAM(S): 25 TABLET, FILM COATED ORAL at 21:53

## 2022-01-01 RX ADMIN — Medication 5 UNIT(S): at 08:47

## 2022-01-01 RX ADMIN — Medication 25 MILLIGRAM(S): at 05:20

## 2022-01-01 RX ADMIN — HEPARIN SODIUM 5000 UNIT(S): 5000 INJECTION INTRAVENOUS; SUBCUTANEOUS at 05:11

## 2022-01-01 RX ADMIN — Medication 650 MILLIGRAM(S): at 18:47

## 2022-01-01 RX ADMIN — MYCOPHENOLATE MOFETIL 500 MILLIGRAM(S): 250 CAPSULE ORAL at 18:00

## 2022-01-01 RX ADMIN — INSULIN GLARGINE 6 UNIT(S): 100 INJECTION, SOLUTION SUBCUTANEOUS at 21:53

## 2022-01-01 RX ADMIN — POLYETHYLENE GLYCOL 3350 17 GRAM(S): 17 POWDER, FOR SOLUTION ORAL at 11:22

## 2022-01-01 RX ADMIN — LEVETIRACETAM 250 MILLIGRAM(S): 250 TABLET, FILM COATED ORAL at 18:01

## 2022-01-01 RX ADMIN — MEROPENEM 100 MILLIGRAM(S): 1 INJECTION INTRAVENOUS at 05:09

## 2022-01-01 RX ADMIN — INSULIN GLARGINE 5 UNIT(S): 100 INJECTION, SOLUTION SUBCUTANEOUS at 21:39

## 2022-01-01 RX ADMIN — Medication 1 APPLICATION(S): at 17:51

## 2022-01-01 RX ADMIN — Medication 2: at 08:31

## 2022-01-01 RX ADMIN — MYCOPHENOLATE MOFETIL 500 MILLIGRAM(S): 250 CAPSULE ORAL at 08:17

## 2022-01-01 RX ADMIN — TACROLIMUS 3 MILLIGRAM(S): 5 CAPSULE ORAL at 07:39

## 2022-01-01 RX ADMIN — HEPARIN SODIUM 5000 UNIT(S): 5000 INJECTION INTRAVENOUS; SUBCUTANEOUS at 19:08

## 2022-01-01 RX ADMIN — Medication 300 MICROGRAM(S): at 17:56

## 2022-01-01 RX ADMIN — WARFARIN SODIUM 10 MILLIGRAM(S): 2.5 TABLET ORAL at 21:11

## 2022-01-01 RX ADMIN — MYCOPHENOLATE MOFETIL 250 MILLIGRAM(S): 250 CAPSULE ORAL at 08:10

## 2022-01-01 RX ADMIN — Medication 80 MILLIGRAM(S): at 17:56

## 2022-01-01 RX ADMIN — CHLORHEXIDINE GLUCONATE 1 APPLICATION(S): 213 SOLUTION TOPICAL at 07:24

## 2022-01-01 RX ADMIN — Medication 650 MILLIGRAM(S): at 10:00

## 2022-01-01 RX ADMIN — Medication 4 MILLIGRAM(S): at 06:02

## 2022-01-01 RX ADMIN — Medication 500 MILLIGRAM(S): at 06:00

## 2022-01-01 RX ADMIN — Medication 100 MILLIGRAM(S): at 05:48

## 2022-01-01 RX ADMIN — CHLORHEXIDINE GLUCONATE 1 APPLICATION(S): 213 SOLUTION TOPICAL at 07:49

## 2022-01-01 RX ADMIN — Medication 1: at 11:31

## 2022-01-01 RX ADMIN — MYCOPHENOLATE MOFETIL 500 MILLIGRAM(S): 250 CAPSULE ORAL at 20:53

## 2022-01-01 RX ADMIN — MAGNESIUM OXIDE 400 MG ORAL TABLET 400 MILLIGRAM(S): 241.3 TABLET ORAL at 13:03

## 2022-01-01 RX ADMIN — Medication 500000 UNIT(S): at 12:30

## 2022-01-01 RX ADMIN — MYCOPHENOLATE MOFETIL 500 MILLIGRAM(S): 250 CAPSULE ORAL at 08:10

## 2022-01-01 RX ADMIN — Medication 1000 MILLIGRAM(S): at 04:26

## 2022-01-01 RX ADMIN — FINASTERIDE 5 MILLIGRAM(S): 5 TABLET, FILM COATED ORAL at 13:58

## 2022-01-01 RX ADMIN — Medication 4 UNIT(S): at 12:19

## 2022-01-01 RX ADMIN — Medication 50 MILLIGRAM(S): at 22:47

## 2022-01-01 RX ADMIN — BRIVARACETAM 50 MILLIGRAM(S): 25 TABLET, FILM COATED ORAL at 08:38

## 2022-01-01 RX ADMIN — ATOVAQUONE 1500 MILLIGRAM(S): 750 SUSPENSION ORAL at 08:04

## 2022-01-01 RX ADMIN — CEFEPIME 100 MILLIGRAM(S): 1 INJECTION, POWDER, FOR SOLUTION INTRAMUSCULAR; INTRAVENOUS at 21:49

## 2022-01-01 RX ADMIN — Medication 1 SPRAY(S): at 18:07

## 2022-01-01 RX ADMIN — Medication 650 MILLIGRAM(S): at 04:09

## 2022-01-01 RX ADMIN — Medication 2: at 05:41

## 2022-01-01 RX ADMIN — Medication 200 MILLIGRAM(S): at 06:04

## 2022-01-01 RX ADMIN — CHLORHEXIDINE GLUCONATE 1 APPLICATION(S): 213 SOLUTION TOPICAL at 06:25

## 2022-01-01 RX ADMIN — ENOXAPARIN SODIUM 60 MILLIGRAM(S): 100 INJECTION SUBCUTANEOUS at 20:27

## 2022-01-01 RX ADMIN — ATORVASTATIN CALCIUM 40 MILLIGRAM(S): 80 TABLET, FILM COATED ORAL at 21:38

## 2022-01-01 RX ADMIN — ERYTHROPOIETIN 10000 UNIT(S): 10000 INJECTION, SOLUTION INTRAVENOUS; SUBCUTANEOUS at 17:45

## 2022-01-01 RX ADMIN — MYCOPHENOLATE MOFETIL 500 MILLIGRAM(S): 250 CAPSULE ORAL at 17:30

## 2022-01-01 RX ADMIN — Medication 1 GRAM(S): at 18:04

## 2022-01-01 RX ADMIN — Medication 6 MILLIGRAM(S): at 21:56

## 2022-01-01 RX ADMIN — Medication 2 DROP(S): at 21:43

## 2022-01-01 RX ADMIN — Medication 1 TABLET(S): at 12:51

## 2022-01-01 RX ADMIN — SENNA PLUS 10 MILLILITER(S): 8.6 TABLET ORAL at 11:48

## 2022-01-01 RX ADMIN — Medication 5 UNIT(S): at 09:02

## 2022-01-01 RX ADMIN — AMLODIPINE BESYLATE 10 MILLIGRAM(S): 2.5 TABLET ORAL at 06:23

## 2022-01-01 RX ADMIN — Medication 50 MICROGRAM(S): at 06:24

## 2022-01-01 RX ADMIN — Medication 8 MILLIGRAM(S): at 22:01

## 2022-01-01 RX ADMIN — Medication 1: at 12:21

## 2022-01-01 RX ADMIN — Medication 25 MILLIGRAM(S): at 06:25

## 2022-01-01 RX ADMIN — Medication 1: at 05:27

## 2022-01-01 RX ADMIN — BRIVARACETAM 50 MILLIGRAM(S): 25 TABLET, FILM COATED ORAL at 08:04

## 2022-01-01 RX ADMIN — Medication 60 MILLIGRAM(S): at 18:08

## 2022-01-01 RX ADMIN — MAGNESIUM OXIDE 400 MG ORAL TABLET 400 MILLIGRAM(S): 241.3 TABLET ORAL at 07:41

## 2022-01-01 RX ADMIN — Medication 2 DROP(S): at 17:36

## 2022-01-01 RX ADMIN — Medication 2 DROP(S): at 05:00

## 2022-01-01 RX ADMIN — TACROLIMUS 3 MILLIGRAM(S): 5 CAPSULE ORAL at 07:58

## 2022-01-01 RX ADMIN — INSULIN GLARGINE 7 UNIT(S): 100 INJECTION, SOLUTION SUBCUTANEOUS at 13:24

## 2022-01-01 RX ADMIN — Medication 2 DROP(S): at 12:38

## 2022-01-01 RX ADMIN — Medication 50 MICROGRAM(S): at 05:22

## 2022-01-01 RX ADMIN — Medication 4: at 17:08

## 2022-01-01 RX ADMIN — INSULIN GLARGINE 5 UNIT(S): 100 INJECTION, SOLUTION SUBCUTANEOUS at 21:19

## 2022-01-01 RX ADMIN — Medication 3 MILLIGRAM(S): at 22:55

## 2022-01-01 RX ADMIN — MIDAZOLAM HYDROCHLORIDE 2 MILLIGRAM(S): 1 INJECTION, SOLUTION INTRAMUSCULAR; INTRAVENOUS at 12:30

## 2022-01-01 RX ADMIN — FOSPHENYTOIN 108 MILLIGRAM(S) PE: 50 INJECTION INTRAMUSCULAR; INTRAVENOUS at 17:38

## 2022-01-01 RX ADMIN — Medication 667 MILLIGRAM(S): at 17:26

## 2022-01-01 RX ADMIN — MYCOPHENOLATE MOFETIL 500 MILLIGRAM(S): 250 CAPSULE ORAL at 08:06

## 2022-01-01 RX ADMIN — INSULIN GLARGINE 8 UNIT(S): 100 INJECTION, SOLUTION SUBCUTANEOUS at 21:24

## 2022-01-01 RX ADMIN — Medication 8 UNIT(S): at 17:48

## 2022-01-01 RX ADMIN — ENOXAPARIN SODIUM 60 MILLIGRAM(S): 100 INJECTION SUBCUTANEOUS at 08:31

## 2022-01-01 RX ADMIN — Medication 60 MILLIGRAM(S): at 18:46

## 2022-01-01 RX ADMIN — Medication 1000 MILLIGRAM(S): at 03:18

## 2022-01-01 RX ADMIN — Medication 4 MILLIGRAM(S): at 04:26

## 2022-01-01 RX ADMIN — Medication 2 DROP(S): at 18:02

## 2022-01-01 RX ADMIN — Medication 2: at 11:09

## 2022-01-01 RX ADMIN — Medication 650 MILLIGRAM(S): at 21:23

## 2022-01-01 RX ADMIN — Medication 80 MILLIGRAM(S): at 12:07

## 2022-01-01 RX ADMIN — Medication 3 UNIT(S): at 11:55

## 2022-01-01 RX ADMIN — Medication 500000 UNIT(S): at 17:32

## 2022-01-01 RX ADMIN — Medication 2: at 09:13

## 2022-01-01 RX ADMIN — Medication 4: at 12:40

## 2022-01-01 RX ADMIN — Medication 500000 UNIT(S): at 17:56

## 2022-01-01 RX ADMIN — Medication 4 UNIT(S): at 07:54

## 2022-01-01 RX ADMIN — ATORVASTATIN CALCIUM 40 MILLIGRAM(S): 80 TABLET, FILM COATED ORAL at 22:06

## 2022-01-01 RX ADMIN — SEVELAMER CARBONATE 800 MILLIGRAM(S): 2400 POWDER, FOR SUSPENSION ORAL at 12:03

## 2022-01-01 RX ADMIN — ERTAPENEM SODIUM 100 MILLIGRAM(S): 1 INJECTION, POWDER, LYOPHILIZED, FOR SOLUTION INTRAMUSCULAR; INTRAVENOUS at 16:55

## 2022-01-01 RX ADMIN — SEVELAMER CARBONATE 800 MILLIGRAM(S): 2400 POWDER, FOR SUSPENSION ORAL at 18:27

## 2022-01-01 RX ADMIN — AMLODIPINE BESYLATE 10 MILLIGRAM(S): 2.5 TABLET ORAL at 06:16

## 2022-01-01 RX ADMIN — Medication 650 MILLIGRAM(S): at 17:46

## 2022-01-01 RX ADMIN — PANTOPRAZOLE SODIUM 40 MILLIGRAM(S): 20 TABLET, DELAYED RELEASE ORAL at 12:21

## 2022-01-01 RX ADMIN — CHLORHEXIDINE GLUCONATE 1 APPLICATION(S): 213 SOLUTION TOPICAL at 05:01

## 2022-01-01 RX ADMIN — DIVALPROEX SODIUM 1000 MILLIGRAM(S): 500 TABLET, DELAYED RELEASE ORAL at 17:09

## 2022-01-01 RX ADMIN — CHLORHEXIDINE GLUCONATE 1 APPLICATION(S): 213 SOLUTION TOPICAL at 06:38

## 2022-01-01 RX ADMIN — FOSPHENYTOIN 104 MILLIGRAM(S) PE: 50 INJECTION INTRAMUSCULAR; INTRAVENOUS at 23:11

## 2022-01-01 RX ADMIN — Medication 10 MILLIGRAM(S): at 03:21

## 2022-01-01 RX ADMIN — CARVEDILOL PHOSPHATE 25 MILLIGRAM(S): 80 CAPSULE, EXTENDED RELEASE ORAL at 10:06

## 2022-01-01 RX ADMIN — Medication 5 MILLIGRAM(S): at 05:44

## 2022-01-01 RX ADMIN — POLYETHYLENE GLYCOL 3350 17 GRAM(S): 17 POWDER, FOR SOLUTION ORAL at 13:11

## 2022-01-01 RX ADMIN — HYDROMORPHONE HYDROCHLORIDE 0.5 MILLIGRAM(S): 2 INJECTION INTRAMUSCULAR; INTRAVENOUS; SUBCUTANEOUS at 01:34

## 2022-01-01 RX ADMIN — ERTAPENEM SODIUM 120 MILLIGRAM(S): 1 INJECTION, POWDER, LYOPHILIZED, FOR SOLUTION INTRAMUSCULAR; INTRAVENOUS at 13:10

## 2022-01-01 RX ADMIN — Medication 3: at 09:23

## 2022-01-01 RX ADMIN — SIROLIMUS 1.5 MILLIGRAM(S): 1 SOLUTION ORAL at 07:42

## 2022-01-01 RX ADMIN — TRAMADOL HYDROCHLORIDE 25 MILLIGRAM(S): 50 TABLET ORAL at 08:04

## 2022-01-01 RX ADMIN — Medication 200 MILLIGRAM(S): at 06:26

## 2022-01-01 RX ADMIN — Medication 650 MILLIGRAM(S): at 10:20

## 2022-01-01 RX ADMIN — Medication 500000 UNIT(S): at 18:15

## 2022-01-01 RX ADMIN — Medication 50 MILLIGRAM(S): at 18:01

## 2022-01-01 RX ADMIN — HEPARIN SODIUM 8 UNIT(S)/HR: 5000 INJECTION INTRAVENOUS; SUBCUTANEOUS at 15:00

## 2022-01-01 RX ADMIN — SODIUM CHLORIDE 75 MILLILITER(S): 9 INJECTION INTRAMUSCULAR; INTRAVENOUS; SUBCUTANEOUS at 23:09

## 2022-01-01 RX ADMIN — BRIVARACETAM 50 MILLIGRAM(S): 25 TABLET, FILM COATED ORAL at 17:39

## 2022-01-01 RX ADMIN — AMLODIPINE BESYLATE 10 MILLIGRAM(S): 2.5 TABLET ORAL at 05:52

## 2022-01-01 RX ADMIN — SENNA PLUS 2 TABLET(S): 8.6 TABLET ORAL at 21:41

## 2022-01-01 RX ADMIN — Medication 4: at 17:51

## 2022-01-01 RX ADMIN — Medication 25 MILLIGRAM(S): at 06:11

## 2022-01-01 RX ADMIN — Medication 100 MILLIGRAM(S): at 20:22

## 2022-01-01 RX ADMIN — Medication 2: at 18:13

## 2022-01-01 RX ADMIN — Medication 2 DROP(S): at 12:19

## 2022-01-01 RX ADMIN — Medication 50 MICROGRAM(S): at 05:11

## 2022-01-01 RX ADMIN — Medication 500000 UNIT(S): at 17:14

## 2022-01-01 RX ADMIN — Medication 650 MILLIGRAM(S): at 11:45

## 2022-01-01 RX ADMIN — Medication 75 MILLIGRAM(S): at 06:07

## 2022-01-01 RX ADMIN — Medication 500000 UNIT(S): at 23:31

## 2022-01-01 RX ADMIN — Medication 500000 UNIT(S): at 00:19

## 2022-01-01 RX ADMIN — CARVEDILOL PHOSPHATE 12.5 MILLIGRAM(S): 80 CAPSULE, EXTENDED RELEASE ORAL at 17:32

## 2022-01-01 RX ADMIN — Medication 100 MILLIGRAM(S): at 21:06

## 2022-01-01 RX ADMIN — MYCOPHENOLATE MOFETIL 500 MILLIGRAM(S): 250 CAPSULE ORAL at 20:03

## 2022-01-01 RX ADMIN — Medication 100 MILLIGRAM(S): at 08:55

## 2022-01-01 RX ADMIN — Medication 50 MICROGRAM(S): at 05:36

## 2022-01-01 RX ADMIN — ENOXAPARIN SODIUM 60 MILLIGRAM(S): 100 INJECTION SUBCUTANEOUS at 09:10

## 2022-01-01 RX ADMIN — HUMAN INSULIN 2 UNIT(S): 100 INJECTION, SUSPENSION SUBCUTANEOUS at 18:31

## 2022-01-01 RX ADMIN — TACROLIMUS 3 MILLIGRAM(S): 5 CAPSULE ORAL at 14:54

## 2022-01-01 RX ADMIN — Medication 80 MILLIGRAM(S): at 22:55

## 2022-01-01 RX ADMIN — Medication 80 MILLIGRAM(S): at 06:26

## 2022-01-01 RX ADMIN — PROPOFOL 4.36 MICROGRAM(S)/KG/MIN: 10 INJECTION, EMULSION INTRAVENOUS at 16:16

## 2022-01-01 RX ADMIN — Medication 5 MILLIGRAM(S): at 06:26

## 2022-01-01 RX ADMIN — Medication 50 MILLIGRAM(S): at 21:37

## 2022-01-01 RX ADMIN — Medication 4 UNIT(S): at 13:12

## 2022-01-01 RX ADMIN — BRIVARACETAM 50 MILLIGRAM(S): 25 TABLET, FILM COATED ORAL at 09:50

## 2022-01-01 RX ADMIN — HYDROMORPHONE HYDROCHLORIDE 0.25 MILLIGRAM(S): 2 INJECTION INTRAMUSCULAR; INTRAVENOUS; SUBCUTANEOUS at 20:37

## 2022-01-01 RX ADMIN — Medication 4 MILLIGRAM(S): at 08:57

## 2022-01-01 RX ADMIN — Medication 1: at 13:21

## 2022-01-01 RX ADMIN — Medication 4 MILLIGRAM(S): at 16:22

## 2022-01-01 RX ADMIN — Medication 1300 MILLIGRAM(S): at 18:07

## 2022-01-01 RX ADMIN — ATORVASTATIN CALCIUM 40 MILLIGRAM(S): 80 TABLET, FILM COATED ORAL at 21:21

## 2022-01-01 RX ADMIN — ATORVASTATIN CALCIUM 40 MILLIGRAM(S): 80 TABLET, FILM COATED ORAL at 21:26

## 2022-01-01 RX ADMIN — Medication 40 MICROGRAM(S): at 21:37

## 2022-01-01 RX ADMIN — Medication 25 GRAM(S): at 11:16

## 2022-01-01 RX ADMIN — Medication 1: at 14:10

## 2022-01-01 RX ADMIN — SENNA PLUS 2 TABLET(S): 8.6 TABLET ORAL at 21:25

## 2022-01-01 RX ADMIN — FOSPHENYTOIN 104 MILLIGRAM(S) PE: 50 INJECTION INTRAMUSCULAR; INTRAVENOUS at 16:47

## 2022-01-01 RX ADMIN — BRIVARACETAM 50 MILLIGRAM(S): 25 TABLET, FILM COATED ORAL at 20:36

## 2022-01-01 RX ADMIN — Medication 500000 UNIT(S): at 12:22

## 2022-01-01 RX ADMIN — BRIVARACETAM 50 MILLIGRAM(S): 25 TABLET, FILM COATED ORAL at 08:18

## 2022-01-01 RX ADMIN — Medication 80 MILLIGRAM(S): at 06:46

## 2022-01-01 RX ADMIN — Medication 50 MILLIGRAM(S): at 13:50

## 2022-01-01 RX ADMIN — BRIVARACETAM 50 MILLIGRAM(S): 25 TABLET, FILM COATED ORAL at 08:02

## 2022-01-01 RX ADMIN — Medication 25 MILLIGRAM(S): at 21:42

## 2022-01-01 RX ADMIN — Medication 75 MILLIGRAM(S): at 21:44

## 2022-01-01 RX ADMIN — Medication 5 MILLIGRAM(S): at 22:10

## 2022-01-01 RX ADMIN — Medication 500000 UNIT(S): at 13:13

## 2022-01-01 RX ADMIN — TACROLIMUS 1 MILLIGRAM(S): 5 CAPSULE ORAL at 07:41

## 2022-01-01 RX ADMIN — Medication 1 TABLET(S): at 13:40

## 2022-01-01 RX ADMIN — Medication 3: at 08:33

## 2022-01-01 RX ADMIN — MEROPENEM 100 MILLIGRAM(S): 1 INJECTION INTRAVENOUS at 05:06

## 2022-01-01 RX ADMIN — AMLODIPINE BESYLATE 10 MILLIGRAM(S): 2.5 TABLET ORAL at 07:15

## 2022-01-01 RX ADMIN — DIVALPROEX SODIUM 250 MILLIGRAM(S): 500 TABLET, DELAYED RELEASE ORAL at 11:41

## 2022-01-01 RX ADMIN — SIROLIMUS 7 MILLIGRAM(S): 1 SOLUTION ORAL at 08:18

## 2022-01-01 RX ADMIN — Medication 1: at 08:24

## 2022-01-01 RX ADMIN — SEVELAMER CARBONATE 800 MILLIGRAM(S): 2400 POWDER, FOR SUSPENSION ORAL at 12:39

## 2022-01-01 RX ADMIN — Medication 8 UNIT(S): at 12:27

## 2022-01-01 RX ADMIN — CHLORHEXIDINE GLUCONATE 1 APPLICATION(S): 213 SOLUTION TOPICAL at 05:14

## 2022-01-01 RX ADMIN — HEPARIN SODIUM 5000 UNIT(S): 5000 INJECTION INTRAVENOUS; SUBCUTANEOUS at 17:40

## 2022-01-01 RX ADMIN — Medication 50 MILLIGRAM(S): at 21:13

## 2022-01-01 RX ADMIN — DIVALPROEX SODIUM 1000 MILLIGRAM(S): 500 TABLET, DELAYED RELEASE ORAL at 05:31

## 2022-01-01 RX ADMIN — Medication 9 UNIT(S): at 08:44

## 2022-01-01 RX ADMIN — MYCOPHENOLATE MOFETIL 1000 MILLIGRAM(S): 250 CAPSULE ORAL at 07:55

## 2022-01-01 RX ADMIN — Medication 20 MILLIGRAM(S): at 05:58

## 2022-01-01 RX ADMIN — Medication 2: at 17:10

## 2022-01-01 RX ADMIN — CHLORHEXIDINE GLUCONATE 1 APPLICATION(S): 213 SOLUTION TOPICAL at 05:21

## 2022-01-01 RX ADMIN — HYDROMORPHONE HYDROCHLORIDE 0.25 MILLIGRAM(S): 2 INJECTION INTRAMUSCULAR; INTRAVENOUS; SUBCUTANEOUS at 22:04

## 2022-01-01 RX ADMIN — HEPARIN SODIUM 5000 UNIT(S): 5000 INJECTION INTRAVENOUS; SUBCUTANEOUS at 17:57

## 2022-01-01 RX ADMIN — VALGANCICLOVIR 450 MILLIGRAM(S): 450 TABLET, FILM COATED ORAL at 12:24

## 2022-01-01 RX ADMIN — Medication 80 MILLIGRAM(S): at 12:18

## 2022-01-01 RX ADMIN — TACROLIMUS 2 MILLIGRAM(S): 5 CAPSULE ORAL at 07:55

## 2022-01-01 RX ADMIN — Medication 650 MILLIGRAM(S): at 01:44

## 2022-01-01 RX ADMIN — Medication 2: at 18:02

## 2022-01-01 RX ADMIN — ERYTHROPOIETIN 10000 UNIT(S): 10000 INJECTION, SOLUTION INTRAVENOUS; SUBCUTANEOUS at 17:09

## 2022-01-01 RX ADMIN — OXYCODONE HYDROCHLORIDE 5 MILLIGRAM(S): 5 TABLET ORAL at 23:17

## 2022-01-01 RX ADMIN — Medication 8 UNIT(S): at 17:39

## 2022-01-01 RX ADMIN — MYCOPHENOLATE MOFETIL 250 MILLIGRAM(S): 250 CAPSULE ORAL at 20:08

## 2022-01-01 RX ADMIN — Medication 2 DROP(S): at 17:16

## 2022-01-01 RX ADMIN — Medication 975 MILLIGRAM(S): at 08:10

## 2022-01-01 RX ADMIN — Medication 4 MILLIGRAM(S): at 16:37

## 2022-01-01 RX ADMIN — MYCOPHENOLATE MOFETIL 250 MILLIGRAM(S): 250 CAPSULE ORAL at 08:38

## 2022-01-01 RX ADMIN — TACROLIMUS 2 MILLIGRAM(S): 5 CAPSULE ORAL at 08:29

## 2022-01-01 RX ADMIN — ENOXAPARIN SODIUM 60 MILLIGRAM(S): 100 INJECTION SUBCUTANEOUS at 11:28

## 2022-01-01 RX ADMIN — ATORVASTATIN CALCIUM 40 MILLIGRAM(S): 80 TABLET, FILM COATED ORAL at 22:15

## 2022-01-01 RX ADMIN — FOSPHENYTOIN 104 MILLIGRAM(S) PE: 50 INJECTION INTRAMUSCULAR; INTRAVENOUS at 00:09

## 2022-01-01 RX ADMIN — MORPHINE SULFATE 1 MILLIGRAM(S): 50 CAPSULE, EXTENDED RELEASE ORAL at 06:12

## 2022-01-01 RX ADMIN — Medication 100 MILLIGRAM(S): at 16:17

## 2022-01-01 RX ADMIN — Medication 60 MILLIGRAM(S): at 17:32

## 2022-01-01 RX ADMIN — Medication 2 DROP(S): at 18:20

## 2022-01-01 RX ADMIN — CARVEDILOL PHOSPHATE 3.12 MILLIGRAM(S): 80 CAPSULE, EXTENDED RELEASE ORAL at 17:19

## 2022-01-01 RX ADMIN — Medication 500000 UNIT(S): at 11:23

## 2022-01-01 RX ADMIN — PANTOPRAZOLE SODIUM 40 MILLIGRAM(S): 20 TABLET, DELAYED RELEASE ORAL at 11:47

## 2022-01-01 RX ADMIN — FLUCONAZOLE 200 MILLIGRAM(S): 150 TABLET ORAL at 12:36

## 2022-01-01 RX ADMIN — Medication 500000 UNIT(S): at 22:09

## 2022-01-01 RX ADMIN — MYCOPHENOLATE MOFETIL 500 MILLIGRAM(S): 250 CAPSULE ORAL at 17:12

## 2022-01-01 RX ADMIN — FENTANYL CITRATE 50 MICROGRAM(S): 50 INJECTION INTRAVENOUS at 01:30

## 2022-01-01 RX ADMIN — ENOXAPARIN SODIUM 60 MILLIGRAM(S): 100 INJECTION SUBCUTANEOUS at 20:56

## 2022-01-01 RX ADMIN — CARVEDILOL PHOSPHATE 6.25 MILLIGRAM(S): 80 CAPSULE, EXTENDED RELEASE ORAL at 09:47

## 2022-01-01 RX ADMIN — CHLORHEXIDINE GLUCONATE 1 APPLICATION(S): 213 SOLUTION TOPICAL at 08:47

## 2022-01-01 RX ADMIN — Medication 3: at 17:27

## 2022-01-01 RX ADMIN — Medication 500 MILLIGRAM(S): at 05:43

## 2022-01-01 RX ADMIN — Medication 2 DROP(S): at 12:14

## 2022-01-01 RX ADMIN — INSULIN GLARGINE 6 UNIT(S): 100 INJECTION, SOLUTION SUBCUTANEOUS at 22:12

## 2022-01-01 RX ADMIN — HEPARIN SODIUM 5000 UNIT(S): 5000 INJECTION INTRAVENOUS; SUBCUTANEOUS at 17:41

## 2022-01-01 RX ADMIN — PERAMPANEL 4 MILLIGRAM(S): 2 TABLET ORAL at 22:47

## 2022-01-01 RX ADMIN — MAGNESIUM OXIDE 400 MG ORAL TABLET 400 MILLIGRAM(S): 241.3 TABLET ORAL at 05:59

## 2022-01-01 RX ADMIN — Medication 25 MILLIGRAM(S): at 14:07

## 2022-01-01 RX ADMIN — OXYCODONE HYDROCHLORIDE 5 MILLIGRAM(S): 5 TABLET ORAL at 20:14

## 2022-01-01 RX ADMIN — BELATACEPT 200 MILLIGRAM(S): 250 INJECTION, POWDER, LYOPHILIZED, FOR SOLUTION INTRAVENOUS at 16:51

## 2022-01-01 RX ADMIN — SENNA PLUS 2 TABLET(S): 8.6 TABLET ORAL at 21:16

## 2022-01-01 RX ADMIN — Medication 1 GRAM(S): at 05:32

## 2022-01-01 RX ADMIN — Medication 3: at 08:02

## 2022-01-01 RX ADMIN — MAGNESIUM OXIDE 400 MG ORAL TABLET 400 MILLIGRAM(S): 241.3 TABLET ORAL at 21:11

## 2022-01-01 RX ADMIN — AMLODIPINE BESYLATE 10 MILLIGRAM(S): 2.5 TABLET ORAL at 06:01

## 2022-01-01 RX ADMIN — Medication 100 GRAM(S): at 20:20

## 2022-01-01 RX ADMIN — Medication 4 MILLIGRAM(S): at 06:15

## 2022-01-01 RX ADMIN — Medication 8 UNIT(S): at 12:35

## 2022-01-01 RX ADMIN — FLUCONAZOLE 100 MILLIGRAM(S): 150 TABLET ORAL at 20:06

## 2022-01-01 RX ADMIN — INSULIN GLARGINE 6 UNIT(S): 100 INJECTION, SOLUTION SUBCUTANEOUS at 21:42

## 2022-01-01 RX ADMIN — Medication 4 MILLIGRAM(S): at 18:52

## 2022-01-01 RX ADMIN — Medication 650 MILLIGRAM(S): at 18:53

## 2022-01-01 RX ADMIN — Medication 500000 UNIT(S): at 11:29

## 2022-01-01 RX ADMIN — FINASTERIDE 5 MILLIGRAM(S): 5 TABLET, FILM COATED ORAL at 11:05

## 2022-01-01 RX ADMIN — MYCOPHENOLATE MOFETIL 1000 MILLIGRAM(S): 250 CAPSULE ORAL at 07:59

## 2022-01-01 RX ADMIN — SIROLIMUS 7 MILLIGRAM(S): 1 SOLUTION ORAL at 07:56

## 2022-01-01 RX ADMIN — MYCOPHENOLATE MOFETIL 250 MILLIGRAM(S): 250 CAPSULE ORAL at 08:32

## 2022-01-01 RX ADMIN — FLUCONAZOLE 100 MILLIGRAM(S): 150 TABLET ORAL at 16:10

## 2022-01-01 RX ADMIN — CHLORHEXIDINE GLUCONATE 1 APPLICATION(S): 213 SOLUTION TOPICAL at 08:57

## 2022-01-01 RX ADMIN — APIXABAN 5 MILLIGRAM(S): 2.5 TABLET, FILM COATED ORAL at 05:21

## 2022-01-01 RX ADMIN — ATORVASTATIN CALCIUM 80 MILLIGRAM(S): 80 TABLET, FILM COATED ORAL at 21:48

## 2022-01-01 RX ADMIN — Medication 4 MILLIGRAM(S): at 17:08

## 2022-01-01 RX ADMIN — Medication 60 MILLIGRAM(S): at 17:38

## 2022-01-01 RX ADMIN — SODIUM CHLORIDE 50 MILLILITER(S): 9 INJECTION INTRAMUSCULAR; INTRAVENOUS; SUBCUTANEOUS at 19:19

## 2022-01-01 RX ADMIN — Medication 500000 UNIT(S): at 00:52

## 2022-01-01 RX ADMIN — Medication 500000 UNIT(S): at 06:45

## 2022-01-01 RX ADMIN — APIXABAN 2.5 MILLIGRAM(S): 2.5 TABLET, FILM COATED ORAL at 11:33

## 2022-01-01 RX ADMIN — PANTOPRAZOLE SODIUM 40 MILLIGRAM(S): 20 TABLET, DELAYED RELEASE ORAL at 12:06

## 2022-01-01 RX ADMIN — Medication 100 MILLIGRAM(S): at 20:49

## 2022-01-01 RX ADMIN — Medication 1300 MILLIGRAM(S): at 22:05

## 2022-01-01 RX ADMIN — Medication 2: at 21:52

## 2022-01-01 RX ADMIN — Medication 80 MILLIGRAM(S): at 05:39

## 2022-01-01 RX ADMIN — FINASTERIDE 5 MILLIGRAM(S): 5 TABLET, FILM COATED ORAL at 12:36

## 2022-01-01 RX ADMIN — Medication 100 MILLIGRAM(S): at 12:10

## 2022-01-01 RX ADMIN — ERTAPENEM SODIUM 120 MILLIGRAM(S): 1 INJECTION, POWDER, LYOPHILIZED, FOR SOLUTION INTRAMUSCULAR; INTRAVENOUS at 12:45

## 2022-01-01 RX ADMIN — FLUCONAZOLE 100 MILLIGRAM(S): 150 TABLET ORAL at 20:46

## 2022-01-01 RX ADMIN — Medication 500000 UNIT(S): at 12:38

## 2022-01-01 RX ADMIN — Medication 4 MILLIGRAM(S): at 05:52

## 2022-01-01 RX ADMIN — Medication 8 UNIT(S): at 13:19

## 2022-01-01 RX ADMIN — ETOMIDATE 20 MILLIGRAM(S): 2 INJECTION INTRAVENOUS at 16:00

## 2022-01-01 RX ADMIN — VALGANCICLOVIR 450 MILLIGRAM(S): 450 TABLET, FILM COATED ORAL at 08:02

## 2022-01-01 RX ADMIN — FINASTERIDE 5 MILLIGRAM(S): 5 TABLET, FILM COATED ORAL at 13:26

## 2022-01-01 RX ADMIN — CHLORHEXIDINE GLUCONATE 1 APPLICATION(S): 213 SOLUTION TOPICAL at 12:06

## 2022-01-01 RX ADMIN — LACTULOSE 10 GRAM(S): 10 SOLUTION ORAL at 20:45

## 2022-01-01 RX ADMIN — Medication 500000 UNIT(S): at 22:06

## 2022-01-01 RX ADMIN — SEVELAMER CARBONATE 800 MILLIGRAM(S): 2400 POWDER, FOR SUSPENSION ORAL at 12:12

## 2022-01-01 RX ADMIN — Medication 250 MILLIGRAM(S): at 08:10

## 2022-01-01 RX ADMIN — CHLORHEXIDINE GLUCONATE 1 APPLICATION(S): 213 SOLUTION TOPICAL at 06:00

## 2022-01-01 RX ADMIN — SEVELAMER CARBONATE 800 MILLIGRAM(S): 2400 POWDER, FOR SUSPENSION ORAL at 08:33

## 2022-01-01 RX ADMIN — FLUCONAZOLE 200 MILLIGRAM(S): 150 TABLET ORAL at 13:49

## 2022-01-01 RX ADMIN — Medication 5 MILLIGRAM(S): at 05:05

## 2022-01-01 RX ADMIN — FOSPHENYTOIN 108 MILLIGRAM(S) PE: 50 INJECTION INTRAMUSCULAR; INTRAVENOUS at 09:35

## 2022-01-01 RX ADMIN — Medication 300 MICROGRAM(S): at 18:33

## 2022-01-01 RX ADMIN — Medication 4 MILLIGRAM(S): at 05:11

## 2022-01-01 RX ADMIN — Medication 5 MILLIGRAM(S): at 06:11

## 2022-01-01 RX ADMIN — FOSPHENYTOIN 104 MILLIGRAM(S) PE: 50 INJECTION INTRAMUSCULAR; INTRAVENOUS at 08:56

## 2022-01-01 RX ADMIN — MYCOPHENOLATE MOFETIL 1000 MILLIGRAM(S): 250 CAPSULE ORAL at 07:48

## 2022-01-01 RX ADMIN — WARFARIN SODIUM 7 MILLIGRAM(S): 2.5 TABLET ORAL at 21:55

## 2022-01-01 RX ADMIN — FINASTERIDE 5 MILLIGRAM(S): 5 TABLET, FILM COATED ORAL at 11:52

## 2022-01-01 RX ADMIN — Medication 1: at 22:31

## 2022-01-01 RX ADMIN — Medication 2: at 18:03

## 2022-01-01 RX ADMIN — Medication 5 MILLIGRAM(S): at 06:22

## 2022-01-01 RX ADMIN — Medication 2 DROP(S): at 23:34

## 2022-01-01 RX ADMIN — SENNA PLUS 2 TABLET(S): 8.6 TABLET ORAL at 21:47

## 2022-01-01 RX ADMIN — FINASTERIDE 5 MILLIGRAM(S): 5 TABLET, FILM COATED ORAL at 11:45

## 2022-01-01 RX ADMIN — Medication 500000 UNIT(S): at 18:07

## 2022-01-01 RX ADMIN — Medication 650 MILLIGRAM(S): at 20:50

## 2022-01-01 RX ADMIN — HUMAN INSULIN 2 UNIT(S): 100 INJECTION, SUSPENSION SUBCUTANEOUS at 14:09

## 2022-01-01 RX ADMIN — BRIVARACETAM 240 MILLIGRAM(S): 25 TABLET, FILM COATED ORAL at 16:26

## 2022-01-01 RX ADMIN — Medication 10 MILLIGRAM(S): at 06:18

## 2022-01-01 RX ADMIN — Medication 4 MILLIGRAM(S): at 06:12

## 2022-01-01 RX ADMIN — Medication 4 UNIT(S): at 14:02

## 2022-01-01 RX ADMIN — Medication 6 MILLIGRAM(S): at 21:55

## 2022-01-01 RX ADMIN — Medication 1300 MILLIGRAM(S): at 17:56

## 2022-01-01 RX ADMIN — Medication 4: at 12:06

## 2022-01-01 RX ADMIN — Medication 50 MILLIGRAM(S): at 13:21

## 2022-01-01 RX ADMIN — FOSPHENYTOIN 104 MILLIGRAM(S) PE: 50 INJECTION INTRAMUSCULAR; INTRAVENOUS at 23:24

## 2022-01-01 RX ADMIN — Medication 4 UNIT(S): at 12:48

## 2022-01-01 RX ADMIN — Medication 500000 UNIT(S): at 17:37

## 2022-01-01 RX ADMIN — Medication 50 MICROGRAM(S): at 06:05

## 2022-01-01 RX ADMIN — Medication 2 DROP(S): at 12:22

## 2022-01-01 RX ADMIN — Medication 200 MILLIGRAM(S): at 17:23

## 2022-01-01 RX ADMIN — CARVEDILOL PHOSPHATE 12.5 MILLIGRAM(S): 80 CAPSULE, EXTENDED RELEASE ORAL at 17:54

## 2022-01-01 RX ADMIN — MYCOPHENOLATE MOFETIL 500 MILLIGRAM(S): 250 CAPSULE ORAL at 18:13

## 2022-01-01 RX ADMIN — Medication 200 GRAM(S): at 10:20

## 2022-01-01 RX ADMIN — Medication 3 UNIT(S): at 13:05

## 2022-01-01 RX ADMIN — Medication 1 TABLET(S): at 12:04

## 2022-01-01 RX ADMIN — Medication 10 MILLIGRAM(S): at 01:41

## 2022-01-01 RX ADMIN — PANTOPRAZOLE SODIUM 40 MILLIGRAM(S): 20 TABLET, DELAYED RELEASE ORAL at 06:24

## 2022-01-01 RX ADMIN — Medication 1000 MILLIGRAM(S): at 07:57

## 2022-01-01 RX ADMIN — Medication 650 MILLIGRAM(S): at 23:58

## 2022-01-01 RX ADMIN — INSULIN GLARGINE 5 UNIT(S): 100 INJECTION, SOLUTION SUBCUTANEOUS at 21:00

## 2022-01-01 RX ADMIN — Medication 100 MILLIGRAM(S): at 13:38

## 2022-01-01 RX ADMIN — ENOXAPARIN SODIUM 60 MILLIGRAM(S): 100 INJECTION SUBCUTANEOUS at 19:55

## 2022-01-01 RX ADMIN — Medication 5 MILLIGRAM(S): at 07:27

## 2022-01-01 RX ADMIN — DIVALPROEX SODIUM 500 MILLIGRAM(S): 500 TABLET, DELAYED RELEASE ORAL at 06:08

## 2022-01-01 RX ADMIN — Medication 4 UNIT(S): at 08:21

## 2022-01-01 RX ADMIN — PERAMPANEL 4 MILLIGRAM(S): 2 TABLET ORAL at 21:11

## 2022-01-01 RX ADMIN — Medication 4: at 17:07

## 2022-01-01 RX ADMIN — Medication 1300 MILLIGRAM(S): at 08:06

## 2022-01-01 RX ADMIN — MAGNESIUM OXIDE 400 MG ORAL TABLET 400 MILLIGRAM(S): 241.3 TABLET ORAL at 11:36

## 2022-01-01 RX ADMIN — CARVEDILOL PHOSPHATE 12.5 MILLIGRAM(S): 80 CAPSULE, EXTENDED RELEASE ORAL at 12:37

## 2022-01-01 RX ADMIN — MYCOPHENOLATE MOFETIL 1000 MILLIGRAM(S): 250 CAPSULE ORAL at 08:06

## 2022-01-01 RX ADMIN — Medication 2 DROP(S): at 17:26

## 2022-01-01 RX ADMIN — DIVALPROEX SODIUM 500 MILLIGRAM(S): 500 TABLET, DELAYED RELEASE ORAL at 09:07

## 2022-01-01 RX ADMIN — FINASTERIDE 5 MILLIGRAM(S): 5 TABLET, FILM COATED ORAL at 12:31

## 2022-01-01 RX ADMIN — Medication 500000 UNIT(S): at 17:15

## 2022-01-01 RX ADMIN — MAGNESIUM OXIDE 400 MG ORAL TABLET 400 MILLIGRAM(S): 241.3 TABLET ORAL at 15:44

## 2022-01-01 RX ADMIN — Medication 50 MICROGRAM(S): at 05:29

## 2022-01-01 RX ADMIN — MYCOPHENOLATE MOFETIL 1000 MILLIGRAM(S): 250 CAPSULE ORAL at 20:22

## 2022-01-01 RX ADMIN — ERYTHROPOIETIN 10000 UNIT(S): 10000 INJECTION, SOLUTION INTRAVENOUS; SUBCUTANEOUS at 15:55

## 2022-01-01 RX ADMIN — Medication 5 UNIT(S): at 17:25

## 2022-01-01 RX ADMIN — ATORVASTATIN CALCIUM 40 MILLIGRAM(S): 80 TABLET, FILM COATED ORAL at 21:07

## 2022-01-01 RX ADMIN — FOSPHENYTOIN 104 MILLIGRAM(S) PE: 50 INJECTION INTRAMUSCULAR; INTRAVENOUS at 08:38

## 2022-01-01 RX ADMIN — Medication 7 UNIT(S): at 09:15

## 2022-01-01 RX ADMIN — IRON SUCROSE 100 MILLIGRAM(S): 20 INJECTION, SOLUTION INTRAVENOUS at 13:52

## 2022-01-01 RX ADMIN — Medication 10 MILLIGRAM(S): at 06:55

## 2022-01-01 RX ADMIN — FLUCONAZOLE 100 MILLIGRAM(S): 150 TABLET ORAL at 16:46

## 2022-01-01 RX ADMIN — ATORVASTATIN CALCIUM 80 MILLIGRAM(S): 80 TABLET, FILM COATED ORAL at 22:11

## 2022-01-01 RX ADMIN — AMLODIPINE BESYLATE 10 MILLIGRAM(S): 2.5 TABLET ORAL at 09:06

## 2022-01-01 RX ADMIN — FOSPHENYTOIN 104 MILLIGRAM(S) PE: 50 INJECTION INTRAMUSCULAR; INTRAVENOUS at 22:01

## 2022-01-01 RX ADMIN — DIVALPROEX SODIUM 500 MILLIGRAM(S): 500 TABLET, DELAYED RELEASE ORAL at 21:14

## 2022-01-01 RX ADMIN — LEVETIRACETAM 250 MILLIGRAM(S): 250 TABLET, FILM COATED ORAL at 05:12

## 2022-01-01 RX ADMIN — MYCOPHENOLATE MOFETIL 1000 MILLIGRAM(S): 250 CAPSULE ORAL at 20:28

## 2022-01-01 RX ADMIN — Medication 3 MILLIGRAM(S): at 21:28

## 2022-01-01 RX ADMIN — Medication 6 MILLIGRAM(S): at 22:27

## 2022-01-01 RX ADMIN — FOSPHENYTOIN 108 MILLIGRAM(S) PE: 50 INJECTION INTRAMUSCULAR; INTRAVENOUS at 05:46

## 2022-01-01 RX ADMIN — Medication 25 MILLIGRAM(S): at 22:20

## 2022-01-01 RX ADMIN — Medication 4: at 13:25

## 2022-01-01 RX ADMIN — Medication 2: at 17:37

## 2022-01-01 RX ADMIN — PANTOPRAZOLE SODIUM 40 MILLIGRAM(S): 20 TABLET, DELAYED RELEASE ORAL at 11:23

## 2022-01-01 RX ADMIN — SIROLIMUS 7 MILLIGRAM(S): 1 SOLUTION ORAL at 08:21

## 2022-01-01 RX ADMIN — Medication 2 DROP(S): at 05:17

## 2022-01-01 RX ADMIN — ATORVASTATIN CALCIUM 40 MILLIGRAM(S): 80 TABLET, FILM COATED ORAL at 21:48

## 2022-01-01 RX ADMIN — BELATACEPT 200 MILLIGRAM(S): 250 INJECTION, POWDER, LYOPHILIZED, FOR SOLUTION INTRAVENOUS at 13:58

## 2022-01-01 RX ADMIN — Medication 400 MILLIGRAM(S): at 07:20

## 2022-01-01 RX ADMIN — Medication 5 MILLIGRAM(S): at 06:08

## 2022-01-01 RX ADMIN — Medication 60 MILLIGRAM(S): at 06:00

## 2022-01-01 RX ADMIN — SENNA PLUS 2 TABLET(S): 8.6 TABLET ORAL at 21:58

## 2022-01-01 RX ADMIN — HYDROMORPHONE HYDROCHLORIDE 0.5 MILLIGRAM(S): 2 INJECTION INTRAMUSCULAR; INTRAVENOUS; SUBCUTANEOUS at 04:12

## 2022-01-01 RX ADMIN — Medication 10: at 17:17

## 2022-01-01 RX ADMIN — MEROPENEM 100 MILLIGRAM(S): 1 INJECTION INTRAVENOUS at 17:30

## 2022-01-01 RX ADMIN — MYCOPHENOLATE MOFETIL 500 MILLIGRAM(S): 250 CAPSULE ORAL at 08:04

## 2022-01-01 RX ADMIN — Medication 1: at 07:57

## 2022-01-01 RX ADMIN — Medication 100 MILLIGRAM(S): at 21:12

## 2022-01-01 RX ADMIN — Medication 40 MILLIGRAM(S): at 05:10

## 2022-01-01 RX ADMIN — Medication 1: at 17:52

## 2022-01-01 RX ADMIN — Medication 50 MILLIGRAM(S): at 06:39

## 2022-01-01 RX ADMIN — Medication 1 TABLET(S): at 13:25

## 2022-01-01 RX ADMIN — ATORVASTATIN CALCIUM 40 MILLIGRAM(S): 80 TABLET, FILM COATED ORAL at 21:16

## 2022-01-01 RX ADMIN — CARVEDILOL PHOSPHATE 3.12 MILLIGRAM(S): 80 CAPSULE, EXTENDED RELEASE ORAL at 05:30

## 2022-01-01 RX ADMIN — PANTOPRAZOLE SODIUM 40 MILLIGRAM(S): 20 TABLET, DELAYED RELEASE ORAL at 13:11

## 2022-01-01 RX ADMIN — Medication 4 MILLIGRAM(S): at 18:19

## 2022-01-01 RX ADMIN — HYDROMORPHONE HYDROCHLORIDE 0.5 MILLIGRAM(S): 2 INJECTION INTRAMUSCULAR; INTRAVENOUS; SUBCUTANEOUS at 10:14

## 2022-01-01 RX ADMIN — Medication 500000 UNIT(S): at 23:15

## 2022-01-01 RX ADMIN — Medication 1 GRAM(S): at 05:48

## 2022-01-01 RX ADMIN — Medication 25 MILLIGRAM(S): at 14:53

## 2022-01-01 RX ADMIN — SEVELAMER CARBONATE 800 MILLIGRAM(S): 2400 POWDER, FOR SUSPENSION ORAL at 18:01

## 2022-01-01 RX ADMIN — Medication 2 DROP(S): at 12:10

## 2022-01-01 RX ADMIN — AMLODIPINE BESYLATE 10 MILLIGRAM(S): 2.5 TABLET ORAL at 06:17

## 2022-01-01 RX ADMIN — FINASTERIDE 5 MILLIGRAM(S): 5 TABLET, FILM COATED ORAL at 13:05

## 2022-01-01 RX ADMIN — INSULIN GLARGINE 5 UNIT(S): 100 INJECTION, SOLUTION SUBCUTANEOUS at 22:37

## 2022-01-01 RX ADMIN — LEVETIRACETAM 250 MILLIGRAM(S): 250 TABLET, FILM COATED ORAL at 17:50

## 2022-01-01 RX ADMIN — Medication 1 APPLICATION(S): at 06:40

## 2022-01-01 RX ADMIN — CARVEDILOL PHOSPHATE 12.5 MILLIGRAM(S): 80 CAPSULE, EXTENDED RELEASE ORAL at 05:15

## 2022-01-01 RX ADMIN — INSULIN HUMAN 3 UNIT(S)/HR: 100 INJECTION, SOLUTION SUBCUTANEOUS at 07:38

## 2022-01-01 RX ADMIN — APIXABAN 5 MILLIGRAM(S): 2.5 TABLET, FILM COATED ORAL at 17:21

## 2022-01-01 RX ADMIN — Medication 650 MILLIGRAM(S): at 15:00

## 2022-01-01 RX ADMIN — Medication 4 MILLIGRAM(S): at 17:32

## 2022-01-01 RX ADMIN — Medication 6 MILLIGRAM(S): at 21:22

## 2022-01-01 RX ADMIN — Medication 1 TABLET(S): at 12:40

## 2022-01-01 RX ADMIN — LEVETIRACETAM 250 MILLIGRAM(S): 250 TABLET, FILM COATED ORAL at 18:43

## 2022-01-01 RX ADMIN — Medication 4 UNIT(S): at 11:42

## 2022-01-01 RX ADMIN — BRIVARACETAM 50 MILLIGRAM(S): 25 TABLET, FILM COATED ORAL at 06:54

## 2022-01-01 RX ADMIN — Medication 200 MILLIGRAM(S): at 05:55

## 2022-01-01 RX ADMIN — Medication 650 MILLIGRAM(S): at 11:49

## 2022-01-01 RX ADMIN — Medication 500000 UNIT(S): at 17:52

## 2022-01-01 RX ADMIN — Medication 20 MILLIGRAM(S): at 05:21

## 2022-01-01 RX ADMIN — Medication 1300 MILLIGRAM(S): at 14:21

## 2022-01-01 RX ADMIN — Medication 63.75 MILLIMOLE(S): at 09:54

## 2022-01-01 RX ADMIN — Medication 10 MILLIGRAM(S): at 05:13

## 2022-01-01 RX ADMIN — Medication 650 MILLIGRAM(S): at 21:41

## 2022-01-01 RX ADMIN — SENNA PLUS 10 MILLILITER(S): 8.6 TABLET ORAL at 11:29

## 2022-01-01 RX ADMIN — Medication 650 MILLIGRAM(S): at 22:40

## 2022-01-01 RX ADMIN — Medication 2 DROP(S): at 11:24

## 2022-01-01 RX ADMIN — HYDROMORPHONE HYDROCHLORIDE 0.5 MILLIGRAM(S): 2 INJECTION INTRAMUSCULAR; INTRAVENOUS; SUBCUTANEOUS at 09:11

## 2022-01-01 RX ADMIN — Medication 2 DROP(S): at 23:30

## 2022-01-01 RX ADMIN — WARFARIN SODIUM 7.5 MILLIGRAM(S): 2.5 TABLET ORAL at 21:34

## 2022-01-01 RX ADMIN — INSULIN GLARGINE 7 UNIT(S): 100 INJECTION, SOLUTION SUBCUTANEOUS at 21:24

## 2022-01-01 RX ADMIN — Medication 1300 MILLIGRAM(S): at 11:26

## 2022-01-01 RX ADMIN — Medication 2: at 12:50

## 2022-01-01 RX ADMIN — Medication 200 MILLIGRAM(S): at 05:46

## 2022-01-01 RX ADMIN — Medication 1300 MILLIGRAM(S): at 06:10

## 2022-01-01 RX ADMIN — Medication 25 GRAM(S): at 00:22

## 2022-01-01 RX ADMIN — Medication 650 MILLIGRAM(S): at 20:20

## 2022-01-01 RX ADMIN — Medication 650 MILLIGRAM(S): at 09:53

## 2022-01-01 RX ADMIN — ERYTHROPOIETIN 10000 UNIT(S): 10000 INJECTION, SOLUTION INTRAVENOUS; SUBCUTANEOUS at 13:56

## 2022-01-01 RX ADMIN — Medication 1000 MILLIGRAM(S): at 18:35

## 2022-01-01 RX ADMIN — Medication 650 MILLIGRAM(S): at 22:15

## 2022-01-01 RX ADMIN — Medication 5 MILLIGRAM(S): at 20:17

## 2022-01-01 RX ADMIN — LEVETIRACETAM 250 MILLIGRAM(S): 250 TABLET, FILM COATED ORAL at 19:57

## 2022-01-01 RX ADMIN — Medication 4: at 22:46

## 2022-01-01 RX ADMIN — MYCOPHENOLATE MOFETIL 500 MILLIGRAM(S): 250 CAPSULE ORAL at 20:00

## 2022-01-01 RX ADMIN — SIROLIMUS 1 MILLIGRAM(S): 1 SOLUTION ORAL at 08:38

## 2022-01-01 RX ADMIN — Medication 3: at 13:36

## 2022-01-01 RX ADMIN — Medication 2 DROP(S): at 00:15

## 2022-01-01 RX ADMIN — Medication 4 MILLIGRAM(S): at 05:13

## 2022-01-01 RX ADMIN — HEPARIN SODIUM 5000 UNIT(S): 5000 INJECTION INTRAVENOUS; SUBCUTANEOUS at 17:51

## 2022-01-01 RX ADMIN — Medication 3 UNIT(S): at 08:30

## 2022-01-01 RX ADMIN — Medication 1: at 17:50

## 2022-01-01 RX ADMIN — Medication 6 MILLIGRAM(S): at 20:49

## 2022-01-01 RX ADMIN — INSULIN GLARGINE 10 UNIT(S): 100 INJECTION, SOLUTION SUBCUTANEOUS at 22:03

## 2022-01-01 RX ADMIN — CHLORHEXIDINE GLUCONATE 1 APPLICATION(S): 213 SOLUTION TOPICAL at 11:51

## 2022-01-01 RX ADMIN — Medication 500000 UNIT(S): at 18:57

## 2022-01-01 RX ADMIN — Medication 20 MILLIGRAM(S): at 05:46

## 2022-01-01 RX ADMIN — Medication 100 MILLIGRAM(S): at 21:29

## 2022-01-01 RX ADMIN — Medication 1 MILLIGRAM(S): at 16:12

## 2022-01-01 RX ADMIN — Medication 2 UNIT(S): at 17:56

## 2022-01-01 RX ADMIN — Medication 100 MILLIGRAM(S): at 13:42

## 2022-01-01 RX ADMIN — Medication 25 MILLIGRAM(S): at 21:39

## 2022-01-01 RX ADMIN — SODIUM CHLORIDE 50 MILLILITER(S): 9 INJECTION, SOLUTION INTRAVENOUS at 08:25

## 2022-01-01 RX ADMIN — HYDROMORPHONE HYDROCHLORIDE 0.5 MILLIGRAM(S): 2 INJECTION INTRAMUSCULAR; INTRAVENOUS; SUBCUTANEOUS at 09:30

## 2022-01-01 RX ADMIN — OXYCODONE HYDROCHLORIDE 5 MILLIGRAM(S): 5 TABLET ORAL at 06:03

## 2022-01-01 RX ADMIN — Medication 500000 UNIT(S): at 12:55

## 2022-01-01 RX ADMIN — FLUCONAZOLE 100 MILLIGRAM(S): 150 TABLET ORAL at 17:45

## 2022-01-01 RX ADMIN — Medication 1 TABLET(S): at 12:48

## 2022-01-01 RX ADMIN — ATOVAQUONE 1500 MILLIGRAM(S): 750 SUSPENSION ORAL at 08:57

## 2022-01-01 RX ADMIN — Medication 500000 UNIT(S): at 06:02

## 2022-01-01 RX ADMIN — SEVELAMER CARBONATE 800 MILLIGRAM(S): 2400 POWDER, FOR SUSPENSION ORAL at 08:30

## 2022-01-01 RX ADMIN — Medication 2: at 13:24

## 2022-01-01 RX ADMIN — ATOVAQUONE 1500 MILLIGRAM(S): 750 SUSPENSION ORAL at 08:18

## 2022-01-01 RX ADMIN — Medication 650 MILLIGRAM(S): at 20:08

## 2022-01-01 RX ADMIN — Medication 500 MILLIGRAM(S): at 17:28

## 2022-01-01 RX ADMIN — SODIUM CHLORIDE 50 MILLILITER(S): 9 INJECTION, SOLUTION INTRAVENOUS at 12:24

## 2022-01-01 RX ADMIN — MYCOPHENOLATE MOFETIL 1000 MILLIGRAM(S): 250 CAPSULE ORAL at 07:44

## 2022-01-01 RX ADMIN — APIXABAN 2.5 MILLIGRAM(S): 2.5 TABLET, FILM COATED ORAL at 17:25

## 2022-01-01 RX ADMIN — Medication 650 MILLIGRAM(S): at 09:00

## 2022-01-01 RX ADMIN — CHLORHEXIDINE GLUCONATE 1 APPLICATION(S): 213 SOLUTION TOPICAL at 06:45

## 2022-01-01 RX ADMIN — DIVALPROEX SODIUM 1000 MILLIGRAM(S): 500 TABLET, DELAYED RELEASE ORAL at 05:35

## 2022-01-01 RX ADMIN — Medication 80 MILLIGRAM(S): at 05:47

## 2022-01-01 RX ADMIN — HEPARIN SODIUM 5000 UNIT(S): 5000 INJECTION INTRAVENOUS; SUBCUTANEOUS at 16:56

## 2022-01-01 RX ADMIN — Medication 2 DROP(S): at 17:30

## 2022-01-01 RX ADMIN — POLYETHYLENE GLYCOL 3350 17 GRAM(S): 17 POWDER, FOR SOLUTION ORAL at 12:55

## 2022-01-01 RX ADMIN — Medication 25 MILLIGRAM(S): at 05:19

## 2022-01-01 RX ADMIN — INSULIN HUMAN 10 UNIT(S): 100 INJECTION, SOLUTION SUBCUTANEOUS at 04:50

## 2022-01-01 RX ADMIN — Medication 1300 MILLIGRAM(S): at 05:31

## 2022-01-01 RX ADMIN — Medication 60 MILLIGRAM(S): at 06:02

## 2022-01-01 RX ADMIN — INSULIN GLARGINE 7 UNIT(S): 100 INJECTION, SOLUTION SUBCUTANEOUS at 21:52

## 2022-01-01 RX ADMIN — INSULIN GLARGINE 9 UNIT(S): 100 INJECTION, SOLUTION SUBCUTANEOUS at 21:42

## 2022-01-01 RX ADMIN — SIROLIMUS 7 MILLIGRAM(S): 1 SOLUTION ORAL at 09:11

## 2022-01-01 RX ADMIN — POLYETHYLENE GLYCOL 3350 17 GRAM(S): 17 POWDER, FOR SOLUTION ORAL at 12:46

## 2022-01-01 RX ADMIN — PANTOPRAZOLE SODIUM 40 MILLIGRAM(S): 20 TABLET, DELAYED RELEASE ORAL at 06:37

## 2022-01-01 RX ADMIN — HYDROMORPHONE HYDROCHLORIDE 0.25 MILLIGRAM(S): 2 INJECTION INTRAMUSCULAR; INTRAVENOUS; SUBCUTANEOUS at 20:22

## 2022-01-01 RX ADMIN — Medication 5 MILLIGRAM(S): at 05:42

## 2022-01-01 RX ADMIN — LEVETIRACETAM 250 MILLIGRAM(S): 250 TABLET, FILM COATED ORAL at 05:44

## 2022-01-01 RX ADMIN — Medication 6: at 13:11

## 2022-01-01 RX ADMIN — POLYETHYLENE GLYCOL 3350 17 GRAM(S): 17 POWDER, FOR SOLUTION ORAL at 12:07

## 2022-01-01 RX ADMIN — Medication 500000 UNIT(S): at 06:41

## 2022-01-01 RX ADMIN — SEVELAMER CARBONATE 800 MILLIGRAM(S): 2400 POWDER, FOR SUSPENSION ORAL at 21:40

## 2022-01-01 RX ADMIN — Medication 200 MILLIGRAM(S): at 05:33

## 2022-01-01 RX ADMIN — MYCOPHENOLATE MOFETIL 500 MILLIGRAM(S): 250 CAPSULE ORAL at 08:59

## 2022-01-01 RX ADMIN — Medication 5 MILLIGRAM(S): at 05:17

## 2022-01-01 RX ADMIN — PANTOPRAZOLE SODIUM 80 MILLIGRAM(S): 20 TABLET, DELAYED RELEASE ORAL at 20:14

## 2022-01-01 RX ADMIN — Medication 1: at 08:45

## 2022-01-01 RX ADMIN — BRIVARACETAM 50 MILLIGRAM(S): 25 TABLET, FILM COATED ORAL at 05:52

## 2022-01-01 RX ADMIN — Medication 9 UNIT(S): at 13:06

## 2022-01-01 RX ADMIN — HEPARIN SODIUM 5000 UNIT(S): 5000 INJECTION INTRAVENOUS; SUBCUTANEOUS at 05:58

## 2022-01-01 RX ADMIN — Medication 10 MILLIGRAM(S): at 05:12

## 2022-01-01 RX ADMIN — SIROLIMUS 7 MILLIGRAM(S): 1 SOLUTION ORAL at 08:57

## 2022-01-01 RX ADMIN — INSULIN GLARGINE 7 UNIT(S): 100 INJECTION, SOLUTION SUBCUTANEOUS at 22:03

## 2022-01-01 RX ADMIN — SEVELAMER CARBONATE 800 MILLIGRAM(S): 2400 POWDER, FOR SUSPENSION ORAL at 08:24

## 2022-01-01 RX ADMIN — Medication 10 MILLIGRAM(S): at 05:14

## 2022-01-01 RX ADMIN — Medication 5 MILLIGRAM(S): at 05:46

## 2022-01-01 RX ADMIN — Medication 4 UNIT(S): at 11:51

## 2022-01-01 RX ADMIN — Medication 1 TABLET(S): at 12:21

## 2022-01-01 RX ADMIN — Medication 650 MILLIGRAM(S): at 16:51

## 2022-01-01 RX ADMIN — SODIUM CHLORIDE 50 MILLILITER(S): 9 INJECTION INTRAMUSCULAR; INTRAVENOUS; SUBCUTANEOUS at 22:05

## 2022-01-01 RX ADMIN — ONDANSETRON 4 MILLIGRAM(S): 8 TABLET, FILM COATED ORAL at 23:00

## 2022-01-01 RX ADMIN — WARFARIN SODIUM 6 MILLIGRAM(S): 2.5 TABLET ORAL at 21:12

## 2022-01-01 RX ADMIN — MYCOPHENOLATE MOFETIL 250 MILLIGRAM(S): 250 CAPSULE ORAL at 09:21

## 2022-01-01 RX ADMIN — Medication 2: at 23:06

## 2022-01-01 RX ADMIN — Medication 1: at 13:20

## 2022-01-01 RX ADMIN — AMLODIPINE BESYLATE 10 MILLIGRAM(S): 2.5 TABLET ORAL at 08:26

## 2022-01-01 RX ADMIN — Medication 2: at 08:45

## 2022-01-01 RX ADMIN — Medication 500000 UNIT(S): at 05:10

## 2022-01-01 RX ADMIN — Medication 1000 MILLILITER(S): at 17:39

## 2022-01-01 RX ADMIN — BRIVARACETAM 50 MILLIGRAM(S): 25 TABLET, FILM COATED ORAL at 21:42

## 2022-01-01 RX ADMIN — CHLORHEXIDINE GLUCONATE 1 APPLICATION(S): 213 SOLUTION TOPICAL at 11:35

## 2022-01-01 RX ADMIN — Medication 10 MILLIGRAM(S): at 06:07

## 2022-01-01 RX ADMIN — Medication 50 MILLIGRAM(S): at 05:52

## 2022-01-01 RX ADMIN — CARVEDILOL PHOSPHATE 12.5 MILLIGRAM(S): 80 CAPSULE, EXTENDED RELEASE ORAL at 17:27

## 2022-01-01 RX ADMIN — Medication 650 MILLIGRAM(S): at 14:26

## 2022-01-01 RX ADMIN — Medication 1 TABLET(S): at 12:05

## 2022-01-01 RX ADMIN — PANTOPRAZOLE SODIUM 40 MILLIGRAM(S): 20 TABLET, DELAYED RELEASE ORAL at 06:20

## 2022-01-01 RX ADMIN — CHLORHEXIDINE GLUCONATE 1 APPLICATION(S): 213 SOLUTION TOPICAL at 08:13

## 2022-01-01 RX ADMIN — Medication 80 MILLIGRAM(S): at 18:41

## 2022-01-01 RX ADMIN — Medication 650 MILLIGRAM(S): at 05:55

## 2022-01-01 RX ADMIN — Medication 1: at 17:42

## 2022-01-01 RX ADMIN — MYCOPHENOLATE MOFETIL 1000 MILLIGRAM(S): 250 CAPSULE ORAL at 08:30

## 2022-01-01 RX ADMIN — HEPARIN SODIUM 5000 UNIT(S): 5000 INJECTION INTRAVENOUS; SUBCUTANEOUS at 17:17

## 2022-01-01 RX ADMIN — Medication 200 MILLIGRAM(S): at 17:01

## 2022-01-01 RX ADMIN — Medication 75 MILLIGRAM(S): at 22:20

## 2022-01-01 RX ADMIN — VALGANCICLOVIR 450 MILLIGRAM(S): 450 TABLET, FILM COATED ORAL at 11:59

## 2022-01-01 RX ADMIN — Medication 25 MILLIGRAM(S): at 05:11

## 2022-01-01 RX ADMIN — PROPOFOL 3.7 MICROGRAM(S)/KG/MIN: 10 INJECTION, EMULSION INTRAVENOUS at 22:32

## 2022-01-01 RX ADMIN — MAGNESIUM OXIDE 400 MG ORAL TABLET 400 MILLIGRAM(S): 241.3 TABLET ORAL at 05:41

## 2022-01-01 RX ADMIN — Medication 60 MILLIGRAM(S): at 21:34

## 2022-01-01 RX ADMIN — Medication 100 MILLIGRAM(S): at 06:10

## 2022-01-01 RX ADMIN — Medication 100 MILLIGRAM(S): at 12:15

## 2022-01-01 RX ADMIN — INSULIN GLARGINE 7 UNIT(S): 100 INJECTION, SOLUTION SUBCUTANEOUS at 22:30

## 2022-01-01 RX ADMIN — Medication 650 MILLIGRAM(S): at 06:20

## 2022-01-01 RX ADMIN — HYDROMORPHONE HYDROCHLORIDE 0.5 MILLIGRAM(S): 2 INJECTION INTRAMUSCULAR; INTRAVENOUS; SUBCUTANEOUS at 22:36

## 2022-01-01 RX ADMIN — Medication 255 MILLIMOLE(S): at 08:12

## 2022-01-01 RX ADMIN — SEVELAMER CARBONATE 800 MILLIGRAM(S): 2400 POWDER, FOR SUSPENSION ORAL at 08:04

## 2022-01-01 RX ADMIN — SODIUM CHLORIDE 75 MILLILITER(S): 9 INJECTION, SOLUTION INTRAVENOUS at 22:02

## 2022-01-01 RX ADMIN — TRAMADOL HYDROCHLORIDE 25 MILLIGRAM(S): 50 TABLET ORAL at 13:51

## 2022-01-01 RX ADMIN — Medication 667 MILLIGRAM(S): at 07:40

## 2022-01-01 RX ADMIN — Medication 500000 UNIT(S): at 20:57

## 2022-01-01 RX ADMIN — INSULIN GLARGINE 6 UNIT(S): 100 INJECTION, SOLUTION SUBCUTANEOUS at 22:44

## 2022-01-01 RX ADMIN — HEPARIN SODIUM 5000 UNIT(S): 5000 INJECTION INTRAVENOUS; SUBCUTANEOUS at 05:51

## 2022-01-01 RX ADMIN — Medication 80 MILLIGRAM(S): at 10:35

## 2022-01-01 RX ADMIN — Medication 80 MILLIGRAM(S): at 11:10

## 2022-01-01 RX ADMIN — Medication 50 MILLIGRAM(S): at 06:49

## 2022-01-01 RX ADMIN — Medication 100 MILLIGRAM(S): at 21:48

## 2022-01-01 RX ADMIN — BRIVARACETAM 50 MILLIGRAM(S): 25 TABLET, FILM COATED ORAL at 18:01

## 2022-01-01 RX ADMIN — ERYTHROPOIETIN 10000 UNIT(S): 10000 INJECTION, SOLUTION INTRAVENOUS; SUBCUTANEOUS at 10:58

## 2022-01-01 RX ADMIN — CHLORHEXIDINE GLUCONATE 1 APPLICATION(S): 213 SOLUTION TOPICAL at 13:19

## 2022-01-01 RX ADMIN — Medication 100 MILLIGRAM(S): at 13:59

## 2022-01-01 RX ADMIN — CARVEDILOL PHOSPHATE 25 MILLIGRAM(S): 80 CAPSULE, EXTENDED RELEASE ORAL at 06:24

## 2022-01-01 RX ADMIN — Medication 4 MILLIGRAM(S): at 16:17

## 2022-01-01 RX ADMIN — Medication 4 MILLIGRAM(S): at 17:47

## 2022-01-01 RX ADMIN — Medication 75 MILLIGRAM(S): at 13:26

## 2022-01-01 RX ADMIN — Medication 500000 UNIT(S): at 13:32

## 2022-01-01 RX ADMIN — Medication 60 MILLIGRAM(S): at 17:17

## 2022-01-01 RX ADMIN — FINASTERIDE 5 MILLIGRAM(S): 5 TABLET, FILM COATED ORAL at 17:27

## 2022-01-01 RX ADMIN — ATORVASTATIN CALCIUM 40 MILLIGRAM(S): 80 TABLET, FILM COATED ORAL at 21:04

## 2022-01-01 RX ADMIN — Medication 8 MILLIGRAM(S): at 22:10

## 2022-01-01 RX ADMIN — Medication 50 MICROGRAM(S): at 06:00

## 2022-01-01 RX ADMIN — Medication 500000 UNIT(S): at 05:00

## 2022-01-01 RX ADMIN — CHLORHEXIDINE GLUCONATE 1 APPLICATION(S): 213 SOLUTION TOPICAL at 08:21

## 2022-01-01 RX ADMIN — SIROLIMUS 7 MILLIGRAM(S): 1 SOLUTION ORAL at 08:12

## 2022-01-01 RX ADMIN — Medication 100 MILLIGRAM(S): at 05:35

## 2022-01-01 RX ADMIN — Medication 25 GRAM(S): at 08:09

## 2022-01-01 RX ADMIN — SENNA PLUS 2 TABLET(S): 8.6 TABLET ORAL at 21:00

## 2022-01-01 RX ADMIN — Medication 1300 MILLIGRAM(S): at 06:13

## 2022-01-01 RX ADMIN — Medication 5 MILLIGRAM(S): at 03:40

## 2022-01-01 RX ADMIN — SEVELAMER CARBONATE 800 MILLIGRAM(S): 2400 POWDER, FOR SUSPENSION ORAL at 17:16

## 2022-01-01 RX ADMIN — Medication 8 UNIT(S): at 09:06

## 2022-01-01 RX ADMIN — Medication 2 DROP(S): at 23:16

## 2022-01-01 RX ADMIN — Medication 20 MILLIGRAM(S): at 10:07

## 2022-01-01 RX ADMIN — Medication 100 MILLIGRAM(S): at 06:13

## 2022-01-01 RX ADMIN — Medication 650 MILLIGRAM(S): at 17:39

## 2022-01-01 RX ADMIN — SEVELAMER CARBONATE 800 MILLIGRAM(S): 2400 POWDER, FOR SUSPENSION ORAL at 12:32

## 2022-01-01 RX ADMIN — MYCOPHENOLATE MOFETIL 500 MILLIGRAM(S): 250 CAPSULE ORAL at 20:06

## 2022-01-01 RX ADMIN — Medication 1 MILLIGRAM(S): at 22:58

## 2022-01-01 RX ADMIN — Medication 100 MILLIGRAM(S): at 12:09

## 2022-01-01 RX ADMIN — Medication 1300 MILLIGRAM(S): at 17:28

## 2022-01-01 RX ADMIN — Medication 500000 UNIT(S): at 00:35

## 2022-01-01 RX ADMIN — Medication 500000 UNIT(S): at 01:02

## 2022-01-01 RX ADMIN — Medication 2 DROP(S): at 17:38

## 2022-01-01 RX ADMIN — Medication 30 MILLIGRAM(S): at 06:07

## 2022-01-01 RX ADMIN — MYCOPHENOLATE MOFETIL 500 MILLIGRAM(S): 250 CAPSULE ORAL at 19:52

## 2022-01-01 RX ADMIN — Medication 9 UNIT(S): at 08:31

## 2022-01-01 RX ADMIN — Medication 4 UNIT(S): at 17:38

## 2022-01-01 RX ADMIN — MAGNESIUM OXIDE 400 MG ORAL TABLET 400 MILLIGRAM(S): 241.3 TABLET ORAL at 14:50

## 2022-01-01 RX ADMIN — Medication 80 MILLIGRAM(S): at 11:50

## 2022-01-01 RX ADMIN — MYCOPHENOLATE MOFETIL 500 MILLIGRAM(S): 250 CAPSULE ORAL at 12:01

## 2022-01-01 RX ADMIN — Medication 9 UNIT(S): at 08:55

## 2022-01-01 RX ADMIN — SIROLIMUS 1.5 MILLIGRAM(S): 1 SOLUTION ORAL at 08:03

## 2022-01-01 RX ADMIN — Medication 100 MILLIGRAM(S): at 14:36

## 2022-01-01 RX ADMIN — HUMAN INSULIN 2 UNIT(S): 100 INJECTION, SUSPENSION SUBCUTANEOUS at 11:10

## 2022-01-01 RX ADMIN — Medication 80 MILLIGRAM(S): at 05:52

## 2022-01-01 RX ADMIN — INSULIN GLARGINE 5 UNIT(S): 100 INJECTION, SOLUTION SUBCUTANEOUS at 20:38

## 2022-01-01 RX ADMIN — SIROLIMUS 1 MILLIGRAM(S): 1 SOLUTION ORAL at 10:05

## 2022-01-01 RX ADMIN — Medication 4: at 18:24

## 2022-01-01 RX ADMIN — SENNA PLUS 10 MILLILITER(S): 8.6 TABLET ORAL at 21:43

## 2022-01-01 RX ADMIN — Medication 3: at 12:56

## 2022-01-01 RX ADMIN — Medication 1: at 23:32

## 2022-01-01 RX ADMIN — Medication 25 MILLIGRAM(S): at 14:56

## 2022-01-01 RX ADMIN — Medication 975 MILLIGRAM(S): at 04:14

## 2022-01-01 RX ADMIN — AMLODIPINE BESYLATE 10 MILLIGRAM(S): 2.5 TABLET ORAL at 05:58

## 2022-01-01 RX ADMIN — Medication 50 MICROGRAM(S): at 06:22

## 2022-01-01 RX ADMIN — CARVEDILOL PHOSPHATE 12.5 MILLIGRAM(S): 80 CAPSULE, EXTENDED RELEASE ORAL at 05:12

## 2022-01-01 RX ADMIN — FINASTERIDE 5 MILLIGRAM(S): 5 TABLET, FILM COATED ORAL at 12:22

## 2022-01-01 RX ADMIN — Medication 20 MILLIGRAM(S): at 20:08

## 2022-01-01 RX ADMIN — Medication 2: at 13:17

## 2022-01-01 RX ADMIN — TACROLIMUS 4 MILLIGRAM(S): 5 CAPSULE ORAL at 07:41

## 2022-01-01 RX ADMIN — SIROLIMUS 1 MILLIGRAM(S): 1 SOLUTION ORAL at 16:55

## 2022-01-01 RX ADMIN — INSULIN GLARGINE 6 UNIT(S): 100 INJECTION, SOLUTION SUBCUTANEOUS at 21:36

## 2022-01-01 RX ADMIN — BRIVARACETAM 50 MILLIGRAM(S): 25 TABLET, FILM COATED ORAL at 05:48

## 2022-01-01 RX ADMIN — Medication 500000 UNIT(S): at 21:30

## 2022-01-01 RX ADMIN — ATORVASTATIN CALCIUM 80 MILLIGRAM(S): 80 TABLET, FILM COATED ORAL at 23:09

## 2022-01-01 RX ADMIN — Medication 975 MILLIGRAM(S): at 18:01

## 2022-01-01 RX ADMIN — Medication 500000 UNIT(S): at 11:45

## 2022-01-01 RX ADMIN — Medication 2: at 17:30

## 2022-01-01 RX ADMIN — Medication 6 MILLIGRAM(S): at 21:48

## 2022-01-01 RX ADMIN — SIROLIMUS 5 MILLIGRAM(S): 1 SOLUTION ORAL at 07:43

## 2022-01-01 RX ADMIN — LEVETIRACETAM 400 MILLIGRAM(S): 250 TABLET, FILM COATED ORAL at 01:28

## 2022-01-01 RX ADMIN — MORPHINE SULFATE 1 MILLIGRAM(S): 50 CAPSULE, EXTENDED RELEASE ORAL at 06:27

## 2022-01-01 RX ADMIN — LEVETIRACETAM 250 MILLIGRAM(S): 250 TABLET, FILM COATED ORAL at 17:56

## 2022-01-01 RX ADMIN — SIROLIMUS 7 MILLIGRAM(S): 1 SOLUTION ORAL at 10:58

## 2022-01-01 RX ADMIN — CHLORHEXIDINE GLUCONATE 1 APPLICATION(S): 213 SOLUTION TOPICAL at 10:00

## 2022-01-01 RX ADMIN — Medication 1: at 08:56

## 2022-01-01 RX ADMIN — PANTOPRAZOLE SODIUM 40 MILLIGRAM(S): 20 TABLET, DELAYED RELEASE ORAL at 17:09

## 2022-01-01 RX ADMIN — Medication 5 MILLIGRAM(S): at 05:07

## 2022-01-01 RX ADMIN — WARFARIN SODIUM 7 MILLIGRAM(S): 2.5 TABLET ORAL at 22:05

## 2022-01-01 RX ADMIN — Medication 650 MILLIGRAM(S): at 22:20

## 2022-01-01 RX ADMIN — HEPARIN SODIUM 5000 UNIT(S): 5000 INJECTION INTRAVENOUS; SUBCUTANEOUS at 22:03

## 2022-01-01 RX ADMIN — Medication 500000 UNIT(S): at 17:27

## 2022-01-01 RX ADMIN — Medication 500000 UNIT(S): at 05:55

## 2022-01-01 RX ADMIN — SODIUM CHLORIDE 50 MILLILITER(S): 9 INJECTION, SOLUTION INTRAVENOUS at 10:25

## 2022-01-01 RX ADMIN — Medication 25 MILLIGRAM(S): at 13:49

## 2022-01-01 RX ADMIN — FLUCONAZOLE 100 MILLIGRAM(S): 150 TABLET ORAL at 16:13

## 2022-01-01 RX ADMIN — Medication 4: at 12:27

## 2022-01-01 RX ADMIN — Medication 7 UNIT(S): at 18:03

## 2022-01-01 RX ADMIN — VALGANCICLOVIR 450 MILLIGRAM(S): 450 TABLET, FILM COATED ORAL at 12:08

## 2022-01-01 RX ADMIN — Medication 4 MILLIGRAM(S): at 06:28

## 2022-01-01 RX ADMIN — LEVETIRACETAM 250 MILLIGRAM(S): 250 TABLET, FILM COATED ORAL at 06:26

## 2022-01-01 RX ADMIN — Medication 650 MILLIGRAM(S): at 14:56

## 2022-01-01 RX ADMIN — Medication 100 MILLIGRAM(S): at 16:00

## 2022-01-01 RX ADMIN — AMLODIPINE BESYLATE 10 MILLIGRAM(S): 2.5 TABLET ORAL at 05:50

## 2022-01-01 RX ADMIN — Medication 4 MILLIGRAM(S): at 17:01

## 2022-01-01 RX ADMIN — Medication 4 MILLIGRAM(S): at 17:30

## 2022-01-01 RX ADMIN — INSULIN GLARGINE 14 UNIT(S): 100 INJECTION, SOLUTION SUBCUTANEOUS at 14:10

## 2022-01-01 RX ADMIN — Medication 8 UNIT(S): at 08:37

## 2022-01-01 RX ADMIN — Medication 500000 UNIT(S): at 05:51

## 2022-01-01 RX ADMIN — Medication 1300 MILLIGRAM(S): at 10:53

## 2022-01-01 RX ADMIN — TACROLIMUS 0.5 MILLIGRAM(S): 5 CAPSULE ORAL at 20:08

## 2022-01-01 RX ADMIN — Medication 9 UNIT(S): at 09:01

## 2022-01-01 RX ADMIN — Medication 4 MILLIGRAM(S): at 17:31

## 2022-01-01 RX ADMIN — AMLODIPINE BESYLATE 10 MILLIGRAM(S): 2.5 TABLET ORAL at 06:24

## 2022-01-01 RX ADMIN — TACROLIMUS 1 MILLIGRAM(S): 5 CAPSULE ORAL at 14:31

## 2022-01-01 RX ADMIN — Medication 55 MILLIGRAM(S): at 13:19

## 2022-01-01 RX ADMIN — MAGNESIUM OXIDE 400 MG ORAL TABLET 400 MILLIGRAM(S): 241.3 TABLET ORAL at 21:13

## 2022-01-01 RX ADMIN — Medication 500000 UNIT(S): at 00:15

## 2022-01-01 RX ADMIN — DIVALPROEX SODIUM 250 MILLIGRAM(S): 500 TABLET, DELAYED RELEASE ORAL at 11:23

## 2022-01-01 RX ADMIN — MYCOPHENOLATE MOFETIL 500 MILLIGRAM(S): 250 CAPSULE ORAL at 06:34

## 2022-01-01 RX ADMIN — Medication 25 GRAM(S): at 00:14

## 2022-01-01 RX ADMIN — MAGNESIUM OXIDE 400 MG ORAL TABLET 400 MILLIGRAM(S): 241.3 TABLET ORAL at 17:58

## 2022-01-01 RX ADMIN — ENOXAPARIN SODIUM 60 MILLIGRAM(S): 100 INJECTION SUBCUTANEOUS at 11:35

## 2022-01-01 RX ADMIN — Medication 25 GRAM(S): at 06:36

## 2022-01-01 RX ADMIN — POLYETHYLENE GLYCOL 3350 17 GRAM(S): 17 POWDER, FOR SOLUTION ORAL at 05:21

## 2022-01-01 RX ADMIN — Medication 80 MILLIGRAM(S): at 07:45

## 2022-01-01 RX ADMIN — INSULIN GLARGINE 6 UNIT(S): 100 INJECTION, SOLUTION SUBCUTANEOUS at 22:20

## 2022-01-01 RX ADMIN — HYDROMORPHONE HYDROCHLORIDE 0.5 MILLIGRAM(S): 2 INJECTION INTRAMUSCULAR; INTRAVENOUS; SUBCUTANEOUS at 22:18

## 2022-01-01 RX ADMIN — SODIUM ZIRCONIUM CYCLOSILICATE 10 GRAM(S): 10 POWDER, FOR SUSPENSION ORAL at 17:40

## 2022-01-01 RX ADMIN — SODIUM CHLORIDE 1 GRAM(S): 9 INJECTION INTRAMUSCULAR; INTRAVENOUS; SUBCUTANEOUS at 05:14

## 2022-01-01 RX ADMIN — TACROLIMUS 0.5 MILLIGRAM(S): 5 CAPSULE ORAL at 07:33

## 2022-01-01 RX ADMIN — POLYETHYLENE GLYCOL 3350 17 GRAM(S): 17 POWDER, FOR SOLUTION ORAL at 06:12

## 2022-01-01 RX ADMIN — HEPARIN SODIUM 5000 UNIT(S): 5000 INJECTION INTRAVENOUS; SUBCUTANEOUS at 06:25

## 2022-01-01 RX ADMIN — PROPOFOL 8.71 MICROGRAM(S)/KG/MIN: 10 INJECTION, EMULSION INTRAVENOUS at 03:55

## 2022-01-01 RX ADMIN — Medication 500000 UNIT(S): at 17:31

## 2022-01-01 RX ADMIN — OXYCODONE HYDROCHLORIDE 5 MILLIGRAM(S): 5 TABLET ORAL at 23:47

## 2022-01-01 RX ADMIN — INSULIN GLARGINE 10 UNIT(S): 100 INJECTION, SOLUTION SUBCUTANEOUS at 21:53

## 2022-01-01 RX ADMIN — VALGANCICLOVIR 450 MILLIGRAM(S): 450 TABLET, FILM COATED ORAL at 11:39

## 2022-01-01 RX ADMIN — Medication 500000 UNIT(S): at 06:23

## 2022-01-01 RX ADMIN — Medication 650 MILLIGRAM(S): at 15:14

## 2022-01-01 RX ADMIN — TACROLIMUS 0.5 MILLIGRAM(S): 5 CAPSULE ORAL at 20:29

## 2022-01-01 RX ADMIN — Medication 2: at 17:26

## 2022-01-01 RX ADMIN — BRIVARACETAM 50 MILLIGRAM(S): 25 TABLET, FILM COATED ORAL at 08:08

## 2022-01-01 RX ADMIN — Medication 500000 UNIT(S): at 17:06

## 2022-01-01 RX ADMIN — SENNA PLUS 2 TABLET(S): 8.6 TABLET ORAL at 21:51

## 2022-01-01 RX ADMIN — Medication 650 MILLIGRAM(S): at 18:10

## 2022-01-01 RX ADMIN — CHLORHEXIDINE GLUCONATE 1 APPLICATION(S): 213 SOLUTION TOPICAL at 07:58

## 2022-01-01 RX ADMIN — HEPARIN SODIUM 5000 UNIT(S): 5000 INJECTION INTRAVENOUS; SUBCUTANEOUS at 05:19

## 2022-01-01 RX ADMIN — Medication 4 MILLIGRAM(S): at 06:16

## 2022-01-01 RX ADMIN — Medication 1 TABLET(S): at 11:24

## 2022-01-01 RX ADMIN — HEPARIN SODIUM 9.5 UNIT(S)/HR: 5000 INJECTION INTRAVENOUS; SUBCUTANEOUS at 08:02

## 2022-01-01 RX ADMIN — Medication 9 UNIT(S): at 18:19

## 2022-01-01 RX ADMIN — Medication 50 MICROGRAM(S): at 05:02

## 2022-01-01 RX ADMIN — CARVEDILOL PHOSPHATE 12.5 MILLIGRAM(S): 80 CAPSULE, EXTENDED RELEASE ORAL at 05:27

## 2022-01-01 RX ADMIN — CARVEDILOL PHOSPHATE 6.25 MILLIGRAM(S): 80 CAPSULE, EXTENDED RELEASE ORAL at 20:31

## 2022-01-01 RX ADMIN — MYCOPHENOLATE MOFETIL 1000 MILLIGRAM(S): 250 CAPSULE ORAL at 20:54

## 2022-01-01 RX ADMIN — CHLORHEXIDINE GLUCONATE 1 APPLICATION(S): 213 SOLUTION TOPICAL at 08:17

## 2022-01-01 RX ADMIN — FINASTERIDE 5 MILLIGRAM(S): 5 TABLET, FILM COATED ORAL at 11:50

## 2022-01-01 RX ADMIN — CHLORHEXIDINE GLUCONATE 1 APPLICATION(S): 213 SOLUTION TOPICAL at 06:10

## 2022-01-01 RX ADMIN — BRIVARACETAM 50 MILLIGRAM(S): 25 TABLET, FILM COATED ORAL at 18:14

## 2022-01-01 RX ADMIN — MEROPENEM 100 MILLIGRAM(S): 1 INJECTION INTRAVENOUS at 14:43

## 2022-01-01 RX ADMIN — APIXABAN 5 MILLIGRAM(S): 2.5 TABLET, FILM COATED ORAL at 17:33

## 2022-01-01 RX ADMIN — Medication 100 MILLIGRAM(S): at 06:09

## 2022-01-01 RX ADMIN — DIVALPROEX SODIUM 500 MILLIGRAM(S): 500 TABLET, DELAYED RELEASE ORAL at 07:53

## 2022-01-01 RX ADMIN — Medication 2 DROP(S): at 00:23

## 2022-01-01 RX ADMIN — Medication 1: at 22:29

## 2022-01-01 RX ADMIN — PANTOPRAZOLE SODIUM 40 MILLIGRAM(S): 20 TABLET, DELAYED RELEASE ORAL at 12:52

## 2022-01-01 RX ADMIN — Medication 1 MILLIGRAM(S): at 17:55

## 2022-01-01 RX ADMIN — ERTAPENEM SODIUM 120 MILLIGRAM(S): 1 INJECTION, POWDER, LYOPHILIZED, FOR SOLUTION INTRAMUSCULAR; INTRAVENOUS at 17:49

## 2022-01-01 RX ADMIN — BRIVARACETAM 50 MILLIGRAM(S): 25 TABLET, FILM COATED ORAL at 21:08

## 2022-01-01 RX ADMIN — FINASTERIDE 5 MILLIGRAM(S): 5 TABLET, FILM COATED ORAL at 12:17

## 2022-01-01 RX ADMIN — Medication 500000 UNIT(S): at 12:32

## 2022-01-01 RX ADMIN — Medication 500000 UNIT(S): at 05:54

## 2022-01-01 RX ADMIN — Medication 4 MILLIGRAM(S): at 05:36

## 2022-01-01 RX ADMIN — Medication 1300 MILLIGRAM(S): at 08:18

## 2022-01-01 RX ADMIN — BRIVARACETAM 50 MILLIGRAM(S): 25 TABLET, FILM COATED ORAL at 18:23

## 2022-01-01 RX ADMIN — Medication 2 MILLIGRAM(S): at 09:22

## 2022-01-01 RX ADMIN — Medication 650 MILLIGRAM(S): at 20:53

## 2022-01-01 RX ADMIN — MYCOPHENOLATE MOFETIL 500 MILLIGRAM(S): 250 CAPSULE ORAL at 08:55

## 2022-01-01 RX ADMIN — TACROLIMUS 0.5 MILLIGRAM(S): 5 CAPSULE ORAL at 07:55

## 2022-01-01 RX ADMIN — Medication 25 MILLIGRAM(S): at 17:30

## 2022-01-01 RX ADMIN — Medication 8 MILLIGRAM(S): at 21:34

## 2022-01-01 RX ADMIN — MYCOPHENOLATE MOFETIL 1000 MILLIGRAM(S): 250 CAPSULE ORAL at 07:17

## 2022-01-01 RX ADMIN — Medication 4 UNIT(S): at 17:30

## 2022-01-01 RX ADMIN — Medication 5 MILLIGRAM(S): at 08:13

## 2022-01-01 RX ADMIN — Medication 650 MILLIGRAM(S): at 05:00

## 2022-01-01 RX ADMIN — Medication 25 GRAM(S): at 08:24

## 2022-01-01 RX ADMIN — MYCOPHENOLATE MOFETIL 500 MILLIGRAM(S): 250 CAPSULE ORAL at 08:39

## 2022-01-01 RX ADMIN — Medication 500000 UNIT(S): at 06:05

## 2022-01-01 RX ADMIN — HEPARIN SODIUM 5000 UNIT(S): 5000 INJECTION INTRAVENOUS; SUBCUTANEOUS at 06:22

## 2022-01-01 RX ADMIN — VALGANCICLOVIR 450 MILLIGRAM(S): 450 TABLET, FILM COATED ORAL at 14:08

## 2022-01-01 RX ADMIN — CARVEDILOL PHOSPHATE 12.5 MILLIGRAM(S): 80 CAPSULE, EXTENDED RELEASE ORAL at 06:08

## 2022-01-01 RX ADMIN — Medication 40 MICROGRAM(S): at 22:06

## 2022-01-01 RX ADMIN — WARFARIN SODIUM 7 MILLIGRAM(S): 2.5 TABLET ORAL at 23:56

## 2022-01-01 RX ADMIN — DIVALPROEX SODIUM 1000 MILLIGRAM(S): 500 TABLET, DELAYED RELEASE ORAL at 18:49

## 2022-01-01 RX ADMIN — Medication 3 MILLIGRAM(S): at 21:25

## 2022-01-01 RX ADMIN — POLYETHYLENE GLYCOL 3350 17 GRAM(S): 17 POWDER, FOR SOLUTION ORAL at 12:52

## 2022-01-01 RX ADMIN — Medication 50 MICROGRAM(S): at 05:16

## 2022-01-01 RX ADMIN — Medication 1 TABLET(S): at 13:11

## 2022-01-01 RX ADMIN — MYCOPHENOLATE MOFETIL 1000 MILLIGRAM(S): 250 CAPSULE ORAL at 08:40

## 2022-01-01 RX ADMIN — POTASSIUM PHOSPHATE, MONOBASIC POTASSIUM PHOSPHATE, DIBASIC 62.5 MILLIMOLE(S): 236; 224 INJECTION, SOLUTION INTRAVENOUS at 02:28

## 2022-01-01 RX ADMIN — PANTOPRAZOLE SODIUM 40 MILLIGRAM(S): 20 TABLET, DELAYED RELEASE ORAL at 11:17

## 2022-01-01 RX ADMIN — Medication 5 MILLIGRAM(S): at 06:24

## 2022-01-01 RX ADMIN — Medication 50 MICROGRAM(S): at 05:39

## 2022-01-01 RX ADMIN — Medication 200 MILLIGRAM(S): at 05:13

## 2022-01-01 RX ADMIN — CARVEDILOL PHOSPHATE 12.5 MILLIGRAM(S): 80 CAPSULE, EXTENDED RELEASE ORAL at 05:52

## 2022-01-01 RX ADMIN — Medication 500000 UNIT(S): at 23:41

## 2022-01-01 RX ADMIN — Medication 100 MILLIGRAM(S): at 12:38

## 2022-01-01 RX ADMIN — MYCOPHENOLATE MOFETIL 500 MILLIGRAM(S): 250 CAPSULE ORAL at 08:52

## 2022-01-01 RX ADMIN — MAGNESIUM OXIDE 400 MG ORAL TABLET 400 MILLIGRAM(S): 241.3 TABLET ORAL at 08:53

## 2022-01-01 RX ADMIN — Medication 650 MILLIGRAM(S): at 06:10

## 2022-01-01 RX ADMIN — Medication 9 UNIT(S): at 17:47

## 2022-01-01 RX ADMIN — Medication 4 UNIT(S): at 17:41

## 2022-01-01 RX ADMIN — DIVALPROEX SODIUM 1000 MILLIGRAM(S): 500 TABLET, DELAYED RELEASE ORAL at 06:08

## 2022-01-01 RX ADMIN — PERAMPANEL 4 MILLIGRAM(S): 2 TABLET ORAL at 21:36

## 2022-01-01 RX ADMIN — FLUCONAZOLE 100 MILLIGRAM(S): 150 TABLET ORAL at 08:19

## 2022-01-01 RX ADMIN — MAGNESIUM OXIDE 400 MG ORAL TABLET 400 MILLIGRAM(S): 241.3 TABLET ORAL at 05:13

## 2022-01-01 RX ADMIN — PIPERACILLIN AND TAZOBACTAM 25 GRAM(S): 4; .5 INJECTION, POWDER, LYOPHILIZED, FOR SOLUTION INTRAVENOUS at 14:18

## 2022-01-01 RX ADMIN — ATOVAQUONE 1500 MILLIGRAM(S): 750 SUSPENSION ORAL at 14:35

## 2022-01-01 RX ADMIN — Medication 25 MILLIGRAM(S): at 21:45

## 2022-01-01 RX ADMIN — MAGNESIUM OXIDE 400 MG ORAL TABLET 400 MILLIGRAM(S): 241.3 TABLET ORAL at 05:47

## 2022-01-01 RX ADMIN — Medication 75 MILLIGRAM(S): at 05:18

## 2022-01-01 RX ADMIN — Medication 500000 UNIT(S): at 06:09

## 2022-01-01 RX ADMIN — SENNA PLUS 2 TABLET(S): 8.6 TABLET ORAL at 21:30

## 2022-01-01 RX ADMIN — VALGANCICLOVIR 450 MILLIGRAM(S): 450 TABLET, FILM COATED ORAL at 12:28

## 2022-01-01 RX ADMIN — Medication 500000 UNIT(S): at 17:47

## 2022-01-01 RX ADMIN — LEVETIRACETAM 250 MILLIGRAM(S): 250 TABLET, FILM COATED ORAL at 06:16

## 2022-01-01 RX ADMIN — PANTOPRAZOLE SODIUM 40 MILLIGRAM(S): 20 TABLET, DELAYED RELEASE ORAL at 11:27

## 2022-01-01 RX ADMIN — Medication 250 MILLIGRAM(S): at 14:08

## 2022-01-01 RX ADMIN — SODIUM CHLORIDE 50 MILLILITER(S): 9 INJECTION INTRAMUSCULAR; INTRAVENOUS; SUBCUTANEOUS at 17:00

## 2022-01-01 RX ADMIN — Medication 20 MILLIGRAM(S): at 17:59

## 2022-01-01 RX ADMIN — HEPARIN SODIUM 12 UNIT(S)/HR: 5000 INJECTION INTRAVENOUS; SUBCUTANEOUS at 21:28

## 2022-01-01 RX ADMIN — Medication 650 MILLIGRAM(S): at 17:47

## 2022-01-01 RX ADMIN — WARFARIN SODIUM 6 MILLIGRAM(S): 2.5 TABLET ORAL at 21:41

## 2022-01-01 RX ADMIN — ERTAPENEM SODIUM 120 MILLIGRAM(S): 1 INJECTION, POWDER, LYOPHILIZED, FOR SOLUTION INTRAMUSCULAR; INTRAVENOUS at 17:06

## 2022-01-01 RX ADMIN — TACROLIMUS 0.5 MILLIGRAM(S): 5 CAPSULE ORAL at 14:18

## 2022-01-01 RX ADMIN — Medication 2 DROP(S): at 14:49

## 2022-01-01 RX ADMIN — BRIVARACETAM 50 MILLIGRAM(S): 25 TABLET, FILM COATED ORAL at 05:47

## 2022-01-01 RX ADMIN — Medication 60 MILLIGRAM(S): at 17:07

## 2022-01-01 RX ADMIN — SODIUM CHLORIDE 50 MILLILITER(S): 9 INJECTION, SOLUTION INTRAVENOUS at 03:58

## 2022-01-01 RX ADMIN — CHLORHEXIDINE GLUCONATE 1 APPLICATION(S): 213 SOLUTION TOPICAL at 12:45

## 2022-01-01 RX ADMIN — CHLORHEXIDINE GLUCONATE 1 APPLICATION(S): 213 SOLUTION TOPICAL at 05:10

## 2022-01-01 RX ADMIN — FOSPHENYTOIN 104 MILLIGRAM(S) PE: 50 INJECTION INTRAMUSCULAR; INTRAVENOUS at 18:02

## 2022-01-01 RX ADMIN — AMLODIPINE BESYLATE 10 MILLIGRAM(S): 2.5 TABLET ORAL at 08:45

## 2022-01-01 RX ADMIN — HEPARIN SODIUM 5000 UNIT(S): 5000 INJECTION INTRAVENOUS; SUBCUTANEOUS at 18:20

## 2022-01-01 RX ADMIN — Medication 6: at 12:22

## 2022-02-03 NOTE — CHART NOTE - NSCHARTNOTEFT_GEN_A_CORE
Spoke to pt's primary nephrology practice as well as pt's son. Reached VM of transplant nephrologist. As per ER, Nephrology fellow is aware and will reach out to transplant nephrology in AM. Hgb at nephrology office was 6.6 If hgb less than 6.5, son okay with transfusion. Outpatient nephrologist is okay with transfusion under 7, pt has been receiving EPO entire time. Will monitor for hemodynamic and clinical stability as well.

## 2022-02-03 NOTE — H&P ADULT - NSHPLABSRESULTS_GEN_ALL_CORE
I have reviewed the labs, imaging and ekg. EKG with NSR HR 68 LBBB, CXR w/ reported RML opacity, seems slightly different than prior but old CXR also w/ possible opacity

## 2022-02-03 NOTE — H&P ADULT - PROBLEM SELECTOR PLAN 1
Description of stool concerning for melena. pt denies hx of GIB in past. No BM today.  -Trend cbc as below  -Clear liquid diet overnight  -Active T/S  -PPI IV BID for now  -GI consult in AM  -Holding AC and asa for now  -See below regarding transfusions

## 2022-02-03 NOTE — H&P ADULT - NSHPPHYSICALEXAM_GEN_ALL_CORE
Vital Signs Last 24 Hrs  T(C): 36.6 (02-03-22 @ 17:09), Max: 36.6 (02-03-22 @ 17:09)  T(F): 97.9 (02-03-22 @ 17:09), Max: 97.9 (02-03-22 @ 17:09)  HR: 67 (02-03-22 @ 20:53) (67 - 69)  BP: 166/85 (02-03-22 @ 20:53) (158/66 - 166/85)  BP(mean): --  RR: 18 (02-03-22 @ 20:53) (18 - 20)  SpO2: 97% (02-03-22 @ 20:53) (97% - 100%)

## 2022-02-03 NOTE — H&P ADULT - ASSESSMENT
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

## 2022-02-03 NOTE — ED PROVIDER NOTE - PROGRESS NOTE DETAILS
WALDO Campbell: brayan Stratton is a cardiologist requested me to call transplant prior to giving blood transfusion. Spoke to transplant nephro fellow Dr. Steven Hyatt who stated if pt receives blood transfusion and gets kidney transplant in future it poses a risk of rejection due to the development of antibodies. Nephro fellow recommended another CBC to be drawn at 12am tonight and if further drops then should consider blood transfusion. Made Medicine attending Dr. Flores aware of nephro fellow recs who agreed with nephro fellow and hold off on blood transfusion and order CBC at 12am. Medicine attending Dr. Flores will have hospitalist f/u 12am CBC. ED attending Dr. Watetrs aware of above discussion. Nephro fellow Dr. Hyatt will make day transplant team aware of pts admission and day team will review chart if consult warranted. Brayan stratton aware of above conversation

## 2022-02-03 NOTE — H&P ADULT - HISTORY OF PRESENT ILLNESS
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 Pt states he has been having very dark stools for roughly 1 week. Went in for labwork today and was told he has very low hgb. Pt states he usually gets EPO at dialysis. Sent in from nephrology office for the recorded. Anemia reportedly at office number was around 6.7. Pt endorses COLE and generalized weakness but denies chest pain, dizziness, syncope or palpitations. Denies any fevers chills. Slight cough only after arrival to ER. Still makes some urine.     In ER: Given keppra 250mg, PPI 80 IV

## 2022-02-03 NOTE — H&P ADULT - PROBLEM SELECTOR PLAN 2
hgb 6.8, Reportedly was 6.7 earlier today. Hgb 11.2 last year. There is concern for increased likelihood of antibodies in setting of possible future transplant if pt receives blood transfusion. Current plan between current specialists and pt is to limit transfusions unless absolutely necessary  -F/u repeat cbc at midnight, if above 6.6 will hold off on transfusion unless suspicion for bleeding overnight  -Will maintain active T/S  -F/u nephrology regarding optimal methodology to Rx anemia including EPO. Risks of transfusion given possible upcoming transplant

## 2022-02-03 NOTE — H&P ADULT - NSHPOUTPATIENTPROVIDERS_GEN_ALL_CORE
Dr. Alfred vs Dr. Barrientos for Nephrology Dr. Alfred [facility]  Dr. Barrientos cell: [phone]  Dr. Rey cell: [phone]  Dr. Witt cell: [phone]  WALDO Valentin cell: [phone]

## 2022-02-03 NOTE — ED PROVIDER NOTE - OBJECTIVE STATEMENT
67 y/o male PMHx ESRD M/W/F (last dialyzed Wednesday), CAD (s/p stents and CABG on ASA), CVA residual right hand weakness, paroxysmal atrial fibrillation on eliquis now directed to the ED from nephro for low H/H. Patient has been straining with BMs and noticed stools have been occasionally dark for the last week. Patient has COLE and generalized weakness. Patient makes some urine. Patient denied CP, dizziness, syncope, palpitations, fever chills, cough, abdominal pain, N/V/D. Patient on transplant list       Nephro: Matheus Rey MD  Transplant: Dr. Metz

## 2022-02-03 NOTE — H&P ADULT - NSHPADDITIONALINFOADULT_GEN_ALL_CORE
I was asked to see this patient by the hospitalist in charge. Dr. Flores to assume care for patient in AM and thereafter

## 2022-02-03 NOTE — ED ADULT NURSE NOTE - OBJECTIVE STATEMENT
pt states he has been feeling very dizzy. Pt is dialysis patient, last dialyzed yesterday, completed successfully

## 2022-02-04 NOTE — CONSULT NOTE ADULT - ASSESSMENT
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   Nephrology consulted for ESRD management  Pt goes to [facility]     ESRD on HD ( MWF ) via tunnelled HD catehter  Outpt unit [facility]  Plan for HD today  Consent in chart    Anemia  IGOR with HD  MOnitor Hb  COnsider GI /  Heme eval    HTN  Bp elevated  UF with HD  Increase amlodipine to 10mg QD if remains elevated post HD    CKD -BMD  Check PTH and PO4  MOnitor serum calcium and PO4 daily

## 2022-02-04 NOTE — CONSULT NOTE ADULT - SUBJECTIVE AND OBJECTIVE BOX
[facility]   MD NINA MASON MD RUORU WONG, PA    TEL:  OFFICE: [phone]  DR HSU CELL: [phone]  WALDO WILSON CELL: [phone]  DR. MARQUEZ CELL: [phone]      FROM 5 PM- 7 AM PLEASE CALL ANSWERING SERVICE AT [phone]    -- INITIAL RENAL CONSULT NOTE --- Date Of service 22 @ 10:24  --------------------------------------------------------------------------------  HPI:    66M w/ PMHx of HTN, ESRD on HD MWF, CAD (post-stents and CABG in 2020), IDDM2, prior CVA (2019 with residual right hand weakness) on keppra and paroxysmal atrial fibrillation on eliquis, plans for kidney transplant p/w melena and anemia.   Nephrology consulted for ESRD management  [facility]       PAST HISTORY  --------------------------------------------------------------------------------  PAST MEDICAL & SURGICAL HISTORY:  Hypertension    Diabetes    Dyslipidemia    CAD (Coronary Artery Disease)  with Stents in 2009 and 2019, s/p off-pump C3L on 20    Hypothyroidism    CVA (cerebral vascular accident)  19 with residual bilateral weakness    Anemia    PEG (percutaneous endoscopic gastrostomy) status  removed 2020    Intubation of airway performed without difficulty  Dec 2019  CVA c/b status epilepticus requiring intubation and PEG in 2019-    ESRD on dialysis  M/W/F    History of insertion of stent into coronary artery bypass graft   and     S/P CABG x 3  off pump C3L on 20      FAMILY HISTORY:  Family history of cancer of tongue (Father)  Parents are  . Father - at 80 years, tongue cancer was a smoker. Mother- at 71, had accident while crossing road. Siblings- 2 brothers and one sister.      PAST SOCIAL HISTORY:    ALLERGIES & MEDICATIONS  --------------------------------------------------------------------------------  Allergies    No Known Allergies    Intolerances      Standing Inpatient Medications  amLODIPine   Tablet 5 milliGRAM(s) Oral daily  atorvastatin 80 milliGRAM(s) Oral at bedtime  carvedilol 6.25 milliGRAM(s) Oral every 12 hours  dextrose 40% Gel 15 Gram(s) Oral once  dextrose 5%. 1000 milliLiter(s) IV Continuous <Continuous>  dextrose 5%. 1000 milliLiter(s) IV Continuous <Continuous>  dextrose 50% Injectable 25 Gram(s) IV Push once  dextrose 50% Injectable 12.5 Gram(s) IV Push once  dextrose 50% Injectable 25 Gram(s) IV Push once  glucagon  Injectable 1 milliGRAM(s) IntraMuscular once  insulin lispro (ADMELOG) corrective regimen sliding scale   SubCutaneous three times a day before meals  insulin lispro (ADMELOG) corrective regimen sliding scale   SubCutaneous at bedtime  levETIRAcetam 250 milliGRAM(s) Oral two times a day  levothyroxine 50 MICROGram(s) Oral daily  pantoprazole  Injectable 40 milliGRAM(s) IV Push two times a day    PRN Inpatient Medications  acetaminophen     Tablet .. 650 milliGRAM(s) Oral every 6 hours PRN  melatonin 3 milliGRAM(s) Oral at bedtime PRN  ondansetron Injectable 4 milliGRAM(s) IV Push every 8 hours PRN      REVIEW OF SYSTEMS  --------------------------------------------------------------------------------  Gen: No fevers/chills  Skin: No rashes  Head/Eyes/Ears: Normal hearing,  Normal vision   Respiratory: No dyspnea, cough  CV: No chest pain  GI: No abdominal pain, diarrhea, constipation, nausea, vomiting  : No dysuria, hematuria  MSK: No  edema  Heme: No easy bruising or bleeding  Psych: No significant depression    All other systems were reviewed and are negative, except as noted.    VITALS/PHYSICAL EXAM  --------------------------------------------------------------------------------  T(C): 36.4 (22 @ 09:50), Max: 36.9 (22 @ 20:53)  HR: 89 (22 @ 09:50) (67 - 89)  BP: 178/78 (22 @ 09:50) (156/75 - 181/74)  RR: 17 (22 @ 09:50) (17 - 20)  SpO2: 98% (22 @ 09:50) (95% - 100%)  Wt(kg): --  Height (cm): 175.3 (22 @ 22:19)  Weight (kg): 63 (22 @ 22:19)  BMI (kg/m2): 20.5 (22 @ 22:19)  BSA (m2): 1.77 (22 @ 22:19)      Physical Exam:  	Gen: NAD  	HEENT: MMM  	Pulm: CTA B/L  	CV: S1S2  	Abd: Soft, +BS   	Ext: No LE edema B/L  	Neuro: Awake, alert  	Skin: Warm and dry  	Vascular access:  HD catheter           :  no garrett  LABS/STUDIES  --------------------------------------------------------------------------------              7.1    8.71  >-----------<  239      [22 06:27]              23.7     136  |  98  |  58  ----------------------------<  148      [22 @ 06:27]  4.8   |  21  |  6.29        Ca     9.2     [22:27]      Mg     2.3     [22 06:27]      Phos  4.6     [22 06:27]    TPro  7.6  /  Alb  4.1  /  TBili  0.3  /  DBili  x   /  AST  11  /  ALT  13  /  AlkPhos  161  [22 06:27]    PT/INR: PT 16.7 , INR 1.41       [22 18:58]  PTT: 38.3       [22 18:58]          [22 18:58]    Creatinine Trend:  SCr 6.29 [:]  SCr 5.44 [ 18:58]    Urinalysis - [20 @ 14:29]      Color Light Yellow / Appearance Clear / SG 1.011 / pH 8.0      Gluc 500 mg/dL / Ketone Negative  / Bili Negative / Urobili Negative       Blood Trace / Protein 300 mg/dL / Leuk Est Negative / Nitrite Negative      RBC 3 / WBC 1 / Hyaline 1 / Gran  / Sq Epi  / Non Sq Epi 0 / Bacteria Negative      Iron 55, TIBC 191, %sat 29      [22 @ 00:04]  Ferritin 1287      [22 @ 00:04]  HbA1c 6.0      [20 @ 08:53]  Lipid: chol 118, TG 54, HDL 59, LDL --      [21 @ 09:44]    HBsAb 78160.7      [21 @ 06:32]  HBsAb Reactive      [17 @ 19:00]  HBsAg Nonreact      [21 @ 04:04]  HBcAb Reactive      [21 @ 06:32]  HCV 0.24, Nonreact      [21 @ 06:32]  HIV Nonreact      [20 19:26]

## 2022-02-04 NOTE — CONSULT NOTE ADULT - SUBJECTIVE AND OBJECTIVE BOX
Chief Complaint:  Patient is a 66y old  Male who presents with a chief complaint of Melena and anemia 66M w/ PMHx of HTN, ESRD on HD MWF, CAD (post-stents and CABG in 2020), IDDM2, prior CVA (2019 with residual right hand weakness) on keppra and paroxysmal atrial fibrillation on eliquis, plans for kidney transplant p/w melena and anemia. Pt states he has been having very dark stools for roughly 1 week. Went in for labwork today and was told he has very low hgb. Pt states he usually gets EPO at dialysis. Sent in from nephrology office for the recorded. Anemia reportedly at office number was around 6.7. Pt endorses COLE and generalized weakness but denies chest pain, dizziness, syncope or palpitations. Denies any fevers chills. Slight cough only after arrival to ER. Still makes some urine.     In ER: Given keppra 250mg, PPI 80 IV     Review of Systems:  · Negative General Symptoms	no fever; no chills  · Negative Skin Symptoms	no rash; no itching  · Negative Ophthalmologic Symptoms	no photophobia  · Negative ENMT Symptoms	no hearing difficulty  · Negative Respiratory and Thorax Symptoms	no wheezing; no dyspnea; no pleuritic chest pain  · Respiratory and Thorax Symptoms	cough  · Respiratory and Thorax Comments	cough only since arrival to ER  · Negative Cardiovascular Symptoms	no chest pain; no palpitations; no dyspnea on exertion  · Negative Gastrointestinal Symptoms	no nausea; no vomiting; no abdominal pain; no hematochezia  · Gastrointestinal Symptoms	melena  · Negative General Genitourinary Symptoms	no flank pain L; no flank pain R  · Negative Musculoskeletal Symptoms	no arthralgia; no arthritis  · Neurological Symptoms	weakness; difficulty walking  · Negative Psychiatric Symptoms	no depression  · Psychiatric Symptoms	anxiety      Allergies:  No Known Allergies      Medications:  acetaminophen     Tablet .. 650 milliGRAM(s) Oral every 6 hours PRN  amLODIPine   Tablet 5 milliGRAM(s) Oral daily  atorvastatin 80 milliGRAM(s) Oral at bedtime  carvedilol 6.25 milliGRAM(s) Oral every 12 hours  dextrose 40% Gel 15 Gram(s) Oral once  dextrose 5%. 1000 milliLiter(s) IV Continuous <Continuous>  dextrose 5%. 1000 milliLiter(s) IV Continuous <Continuous>  dextrose 50% Injectable 25 Gram(s) IV Push once  dextrose 50% Injectable 12.5 Gram(s) IV Push once  dextrose 50% Injectable 25 Gram(s) IV Push once  epoetin cortney-epbx (RETACRIT) Injectable 93117 Unit(s) IV Push <User Schedule>  glucagon  Injectable 1 milliGRAM(s) IntraMuscular once  insulin lispro (ADMELOG) corrective regimen sliding scale   SubCutaneous three times a day before meals  insulin lispro (ADMELOG) corrective regimen sliding scale   SubCutaneous at bedtime  levETIRAcetam 250 milliGRAM(s) Oral two times a day  levothyroxine 50 MICROGram(s) Oral daily  melatonin 3 milliGRAM(s) Oral at bedtime PRN  ondansetron Injectable 4 milliGRAM(s) IV Push every 8 hours PRN  pantoprazole  Injectable 40 milliGRAM(s) IV Push two times a day      PMHX/PSHX:  Hypertension    Diabetes    Dyslipidemia    CAD (Coronary Artery Disease)    CAD (Coronary Artery Disease)    CRI (Chronic Renal Insufficiency)    Hypothyroidism    CVA (cerebral vascular accident)    Anemia    PEG (percutaneous endoscopic gastrostomy) status    Intubation of airway performed without difficulty    ESRD on dialysis    No significant past surgical history    History of insertion of stent into coronary artery bypass graft    S/P CABG x 3        Family history:  No pertinent family history in first degree relatives    No pertinent family history in first degree relatives    No pertinent family history in first degree relatives    FH: HTN (hypertension)    No pertinent family history in first degree relatives    Family history of cancer of tongue (Father)        Social History:   no ivda no prbc  no etoh no cigs    ROS:     General:  No wt loss, fevers, chills, night sweats, fatigue,   Eyes:  Good vision, no reported pain  ENT:  No sore throat, pain, runny nose, dysphagia  CV:  No pain, palpitations, hypo/hypertension  Resp:  No dyspnea, cough, tachypnea, wheezing  GI:  No pain, No nausea, No vomiting, No diarrhea, No constipation, No weight loss, No fever, No pruritis, No rectal bleeding, No tarry stools, No dysphagia,  :  No pain, bleeding, incontinence, nocturia  Muscle:  No pain, weakness  Neuro:  No weakness, tingling, memory problems  Psych:  No fatigue, insomnia, mood problems, depression  Endocrine:  No polyuria, polydipsia, cold/heat intolerance  Heme:  No petechiae, ecchymosis, easy bruisability  Skin:  No rash, tattoos, scars, edema      PHYSICAL EXAM:   Vital Signs:  Vital Signs Last 24 Hrs  T(C): 36.5 (2022 20:39), Max: 36.8 (2022 18:10)  T(F): 97.7 (2022 20:39), Max: 98.2 (2022 18:10)  HR: 65 (2022 20:39) (63 - 89)  BP: 168/74 (2022 20:39) (156/75 - 181/74)  BP(mean): --  RR: 18 (2022 20:39) (17 - 18)  SpO2: 100% (2022 20:39) (94% - 100%)  Daily     Daily Weight in k.1 (2022 18:10)    GENERAL:  Appears stated age, well-groomed, well-nourished, no distress  HEENT:  NC/AT,  conjunctivae clear and pink, no thyromegaly, nodules, adenopathy, no JVD, sclera -anicteric  CHEST:  Full & symmetric excursion, no increased effort, breath sounds clear  HEART:  Regular rhythm, S1, S2, no murmur/rub/S3/S4, no abdominal bruit, no edema  ABDOMEN:  Soft, non-tender, non-distended, normoactive bowel sounds,  no masses ,no hepato-splenomegaly, no signs of chronic liver disease  EXTEREMITIES:  no cyanosis,clubbing or edema  SKIN:  No rash/erythema/ecchymoses/petechiae/wounds/abscess/warm/dry  NEURO:  Alert, oriented, no asterixis, no tremor, no encephalopathy    LABS:                        7.1    8.71  )-----------( 239      ( 2022 06:27 )             23.7     02-04    136  |  98  |  58<H>  ----------------------------<  148<H>  4.8   |  21<L>  |  6.29<H>    Ca    9.2      2022 06:27  Phos  4.6     02-04  Mg     2.3     02-04    TPro  7.6  /  Alb  4.1  /  TBili  0.3  /  DBili  x   /  AST  11  /  ALT  13  /  AlkPhos  161<H>  02-04    LIVER FUNCTIONS - ( 2022 06:27 )  Alb: 4.1 g/dL / Pro: 7.6 g/dL / ALK PHOS: 161 U/L / ALT: 13 U/L / AST: 11 U/L / GGT: x           PT/INR - ( 2022 18:58 )   PT: 16.7 sec;   INR: 1.41 ratio         PTT - ( 2022 18:58 )  PTT:38.3 sec        Imaging:

## 2022-02-04 NOTE — CONSULT NOTE ADULT - ASSESSMENT
anemia  melena    plan    daily cbc   transfuse prn   monitor cbc and transfuse prn for hgb <7.5  PPI bid  Carafate 1g PO BID   Hold A/C if okay with cardiology   Avoid NSAIDs  May need EGD once optimized  check iron studies   ppi once a day  check stool occult blood  further recommendations pending above    Advanced care planning was discussed with patient and family.  Advanced care planning forms were reviewed and discussed.  Risks, benefits and alternatives of gastroenterologic procedures were discussed in detail and all questions were answered.    30 minutes spent.

## 2022-02-04 NOTE — PATIENT PROFILE ADULT - VISION (WITH CORRECTIVE LENSES IF THE PATIENT USUALLY WEARS THEM):
Normal vision: sees adequately in most situations; can see medication labels, newsprint
Mood episode/Perceived burden on family and others/Access to means (pills, firearms, etc.)/Anhedonia/Agitation/severe anxiety/Hopelessness/Unable to engage in safety planning/Highly impulsive behavior/History of abuse/trauma

## 2022-02-04 NOTE — PATIENT PROFILE ADULT - FALL HARM RISK - HARM RISK INTERVENTIONS

## 2022-02-06 NOTE — CONSULT NOTE ADULT - ASSESSMENT
66M with PMHx of hypertension, ESRD pending renal transplant, CAD (post-stents and CABG in August 2020), T2DM, and prior CVA in 2019 p/w dark stool and drop in hgb from baseline from dialysis center. Currently, HDS.     #AFib/AFlutter, CHASDVASC=6   #CAD s/p multiple PCIs in the past and then 3V CABG in 8/2020   #ESKD on HD, undergoing transplant eval   #Chronic LBBB     -cont current bp meds of norvasc and coreg, agree with holding home ARB for now given GIB.   -hold AC given concurrent GIB, restart when able   -chronic LBBB, unchanged from prior   -no definitive plan for endoscopic intervention from GI from speaking to the primary team   -would repeat TTE given most recent was >1 year ago     Thank you for allowing us to participate in the care of your patient. If you have any questions or concerns please do not hesitate to contact us 24/7.   All Cardiology service information can be found 24/7 on [email or web address], password: [email or web address] [email or web address] MD  PGY-5 Cardiology Fellow, [facility]/RUFINA       66M with PMHx of hypertension, ESRD pending renal transplant, CAD (post-stents and CABG in August 2020), T2DM, and prior CVA in 2019 p/w dark stool and drop in hgb from baseline from dialysis center. Currently, HDS.     #AFib/AFlutter, CHASDVASC=6   #CAD s/p multiple PCIs in the past and then 3V CABG in 8/2020   #ESKD on HD, undergoing transplant eval   #Chronic LBBB     -cont current bp meds of norvasc and coreg, agree with holding home ARB for now given GIB.   -hold AC given concurrent GIB, restart when able   -chronic LBBB, unchanged from prior   -no definitive plan for endoscopic intervention from GI from speaking to the primary team     Thank you for allowing us to participate in the care of your patient. If you have any questions or concerns please do not hesitate to contact us 24/7.   All Cardiology service information can be found 24/7 on [email or web address], password: [email or web address] Ledezma MD  PGY-5 Cardiology Fellow, [facility]

## 2022-02-06 NOTE — CONSULT NOTE ADULT - SUBJECTIVE AND OBJECTIVE BOX
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 Pt states he has been having very dark stools for roughly 1 week. Went in for labwork today and was told he has very low hgb. Pt states he usually gets EPO at dialysis. Sent in from nephrology office for the recorded. Anemia reportedly at office number was around 6.7. Pt endorses COLE and generalized weakness but denies chest pain, dizziness, syncope or palpitations. Denies any fevers chills. Slight cough only after arrival to ER. Still makes some urine.           PMHx:   Hypertension    Diabetes    Dyslipidemia    CAD (Coronary Artery Disease)    CAD (Coronary Artery Disease)    CRI (Chronic Renal Insufficiency)    Hypothyroidism    CVA (cerebral vascular accident)    Anemia    PEG (percutaneous endoscopic gastrostomy) status    Intubation of airway performed without difficulty    ESRD on dialysis        PSHx:   No significant past surgical history    History of insertion of stent into coronary artery bypass graft    S/P CABG x 3        Allergies:  No Known Allergies      Home Meds:    Current Medications:   acetaminophen     Tablet .. 650 milliGRAM(s) Oral every 6 hours PRN  amLODIPine   Tablet 10 milliGRAM(s) Oral daily  atorvastatin 80 milliGRAM(s) Oral at bedtime  carvedilol 12.5 milliGRAM(s) Oral every 12 hours  chlorhexidine 2% Cloths 1 Application(s) Topical daily  dextrose 40% Gel 15 Gram(s) Oral once  dextrose 5%. 1000 milliLiter(s) IV Continuous <Continuous>  dextrose 5%. 1000 milliLiter(s) IV Continuous <Continuous>  dextrose 50% Injectable 25 Gram(s) IV Push once  dextrose 50% Injectable 12.5 Gram(s) IV Push once  dextrose 50% Injectable 25 Gram(s) IV Push once  epoetin cortney-epbx (RETACRIT) Injectable 00612 Unit(s) IV Push <User Schedule>  glucagon  Injectable 1 milliGRAM(s) IntraMuscular once  insulin lispro (ADMELOG) corrective regimen sliding scale   SubCutaneous three times a day before meals  insulin lispro (ADMELOG) corrective regimen sliding scale   SubCutaneous at bedtime  levETIRAcetam 250 milliGRAM(s) Oral two times a day  levothyroxine 50 MICROGram(s) Oral daily  melatonin 3 milliGRAM(s) Oral at bedtime PRN  ondansetron Injectable 4 milliGRAM(s) IV Push every 8 hours PRN  pantoprazole  Injectable 40 milliGRAM(s) IV Push two times a day  sucralfate suspension 1 Gram(s) Oral every 12 hours      FAMILY HISTORY:  Family history of cancer of tongue (Father)  Parents are  . Father - at 80 years, tongue cancer was a smoker. Mother- at 71, had accident while crossing road. Siblings- 2 brothers and one sister.        Social History: Personally reviewed   No tobacco, EtOH or IVDU     REVIEW OF SYSTEMS:  Constitutional:     [x ] negative [ ] fevers [ ] chills [ ] weight loss [ ] weight gain  HEENT:                  [x ] negative [ ] dry eyes [ ] eye irritation [ ] postnasal drip [ ] nasal congestion  CV:                         [ x] negative  [ ] chest pain [ ] orthopnea [ ] palpitations [ ] murmur  Resp:                     [x ] negative [ ] cough [ ] shortness of breath [ ] dyspnea [ ] wheezing [ ] sputum [ ]hemoptysis  GI:                          [ x] negative [ ] nausea [ ] vomiting [ ] diarrhea [ ] constipation [ ] abd pain [ ] dysphagia   :                        [ x] negative [ ] dysuria [ ] nocturia [ ] hematuria [ ] increased urinary frequency  Musculoskeletal: [x ] negative [ ] back pain [ ] myalgias [ ] arthralgias [ ] fracture  Skin:                       [ x] negative [ ] rash [ ] itch  Neurological:        [ x] negative [ ] headache [ ] dizziness [ ] syncope [ ] weakness [ ] numbness  Psychiatric:           [ x] negative [ ] anxiety [ ] depression  Endocrine:            [ x] negative [ ] diabetes [ ] thyroid problem  Heme/Lymph:      [ x] negative [ ] anemia [ ] bleeding problem  Allergic/Immune: [ x] negative [ ] itchy eyes [ ] nasal discharge [ ] hives [ ] angioedema    [ x] All other systems negative  [ ] Unable to assess ROS due to      Physical Exam:  T(F): 97.9 (-), Max: 98.7 (-)  HR: 66 (-) (62 - 67)  BP: 157/67 (-) (132/69 - 177/71)  RR: 18 (-06)  SpO2: 96% (-)  GENERAL: No acute distress, well-developed  HEAD:  Atraumatic, Normocephalic  ENT: EOMI, PERRLA, conjunctiva and sclera clear, Neck supple, No JVD, moist mucosa  CHEST/LUNG: Clear to auscultation bilaterally; No wheeze, equal breath sounds bilaterally   BACK: No spinal tenderness  HEART: Regular rate and rhythm; No murmurs, rubs, or gallops  ABDOMEN: Soft, Nontender, Nondistended; Bowel sounds present  EXTREMITIES:  No clubbing, cyanosis, or edema  PSYCH: Nl behavior, nl affect  NEUROLOGY: AAOx3, non-focal, cranial nerves intact  SKIN: Normal color, No rashes or lesions  LINES:    Cardiovascular Diagnostic Testing:      Imaging:      Labs: Personally reviewed                        6.4    5.82  )-----------( 214      ( 2022 08:58 )             20.4       Phos  4.0     02-05    TPro  7.0  /  Alb  x   /  TBili  x   /  DBili  x   /  AST  x   /  ALT  x   /  AlkPhos  x   -             Patient seen and evaluated at bedside    Chief Complaint: GIB     HPI:  66M with PMHx of hypertension, ESRD pending renal transplant, CAD (post-stents and CABG in 2020), T2DM, and prior CVA in 2019 p/w dark stool and drop in hgb from baseline from dialysis center. Currently, pt is cp free, no dyspnea on RA, and denies abdominal discomfort.      PMHx:   Hypertension    Diabetes    Dyslipidemia    CAD (Coronary Artery Disease)    CAD (Coronary Artery Disease)    CRI (Chronic Renal Insufficiency)    Hypothyroidism    CVA (cerebral vascular accident)    Anemia    PEG (percutaneous endoscopic gastrostomy) status    Intubation of airway performed without difficulty    ESRD on dialysis        PSHx:   No significant past surgical history    History of insertion of stent into coronary artery bypass graft    S/P CABG x 3        Allergies:  No Known Allergies      Home Meds:    Current Medications:   acetaminophen     Tablet .. 650 milliGRAM(s) Oral every 6 hours PRN  amLODIPine   Tablet 10 milliGRAM(s) Oral daily  atorvastatin 80 milliGRAM(s) Oral at bedtime  carvedilol 12.5 milliGRAM(s) Oral every 12 hours  chlorhexidine 2% Cloths 1 Application(s) Topical daily  dextrose 40% Gel 15 Gram(s) Oral once  dextrose 5%. 1000 milliLiter(s) IV Continuous <Continuous>  dextrose 5%. 1000 milliLiter(s) IV Continuous <Continuous>  dextrose 50% Injectable 25 Gram(s) IV Push once  dextrose 50% Injectable 12.5 Gram(s) IV Push once  dextrose 50% Injectable 25 Gram(s) IV Push once  epoetin cortney-epbx (RETACRIT) Injectable 69531 Unit(s) IV Push <User Schedule>  glucagon  Injectable 1 milliGRAM(s) IntraMuscular once  insulin lispro (ADMELOG) corrective regimen sliding scale   SubCutaneous three times a day before meals  insulin lispro (ADMELOG) corrective regimen sliding scale   SubCutaneous at bedtime  levETIRAcetam 250 milliGRAM(s) Oral two times a day  levothyroxine 50 MICROGram(s) Oral daily  melatonin 3 milliGRAM(s) Oral at bedtime PRN  ondansetron Injectable 4 milliGRAM(s) IV Push every 8 hours PRN  pantoprazole  Injectable 40 milliGRAM(s) IV Push two times a day  sucralfate suspension 1 Gram(s) Oral every 12 hours      FAMILY HISTORY:  Family history of cancer of tongue (Father)  Parents are  . Father - at 80 years, tongue cancer was a smoker. Mother- at 71, had accident while crossing road. Siblings- 2 brothers and one sister.        Social History: Personally reviewed   No tobacco, EtOH or IVDU     REVIEW OF SYSTEMS:  Constitutional:     [x ] negative [ ] fevers [ ] chills [ ] weight loss [ ] weight gain  HEENT:                  [x ] negative [ ] dry eyes [ ] eye irritation [ ] postnasal drip [ ] nasal congestion  CV:                         [ x] negative  [ ] chest pain [ ] orthopnea [ ] palpitations [ ] murmur  Resp:                     [x ] negative [ ] cough [ ] shortness of breath [ ] dyspnea [ ] wheezing [ ] sputum [ ]hemoptysis  GI:                          [ x] negative [ ] nausea [ ] vomiting [ ] diarrhea [ ] constipation [ ] abd pain [ ] dysphagia   :                        [ x] negative [ ] dysuria [ ] nocturia [ ] hematuria [ ] increased urinary frequency  Musculoskeletal: [x ] negative [ ] back pain [ ] myalgias [ ] arthralgias [ ] fracture  Skin:                       [ x] negative [ ] rash [ ] itch  Neurological:        [ x] negative [ ] headache [ ] dizziness [ ] syncope [ ] weakness [ ] numbness  Psychiatric:           [ x] negative [ ] anxiety [ ] depression  Endocrine:            [ x] negative [ ] diabetes [ ] thyroid problem  Heme/Lymph:      [ x] negative [ ] anemia [ ] bleeding problem  Allergic/Immune: [ x] negative [ ] itchy eyes [ ] nasal discharge [ ] hives [ ] angioedema    [ x] All other systems negative  [ ] Unable to assess ROS due to      Physical Exam:  T(F): 97.9 (-), Max: 98.7 ()  HR: 66 (-) (62 - 67)  BP: 157/67 (-) (132/69 - 177/71)  RR: 18 (-)  SpO2: 96% (-)  GENERAL: No acute distress, well-developed  HEAD:  Atraumatic, Normocephalic  ENT: EOMI, PERRLA, conjunctiva and sclera clear, Neck supple, No JVD, moist mucosa  CHEST/LUNG: Clear to auscultation bilaterally; No wheeze, equal breath sounds bilaterally   BACK: No spinal tenderness  HEART: Regular rate and rhythm; No murmurs, rubs, or gallops  ABDOMEN: Soft, Nontender, Nondistended; Bowel sounds present  EXTREMITIES:  No clubbing, cyanosis, or edema  PSYCH: Nl behavior, nl affect  NEUROLOGY: AAOx3, non-focal, cranial nerves intact  SKIN: Normal color, No rashes or lesions    Cardiovascular Diagnostic Testing:  [facility]   CORONARY VESSELS: The coronary circulation is left dominant.  LM:   --  Distal left main: There was a 70 % stenosis.  LAD:   --  Ostial LAD: There was a 75 % stenosis.  --  Proximal LAD: There was a 80 % stenosis at the site proximal edge  stent.  CX:   --  Mid circumflex: There was a 40 % stenosis.  --  OM1: There was a 0 % stenosis at the site of a prior stent.  RCA:   --  RCA: The vessel was very small sized. Angiography showed severe  atherosclerosis.    TTE   Conclusions:  1. Moderately dilated left atrium.  LA volume index = 42  cc/m2.  2. Low normal left ventricular systolic function. Septal  motion consistent with cardiac surgery.  3. Moderate right atrial enlargement.  4. Normal right ventricular size and function.  5. Estimated pulmonary artery systolic pressure equals 48  mm Hg, assuming right atrial pressure equals 12 mm Hg,  consistent with mild pulmonary pressures.        Imaging:      Labs: Personally reviewed                        6.4    5.82  )-----------( 214      ( 2022 08:58 )             20.4       Phos  4.0     02-05    TPro  7.0  /  Alb  x   /  TBili  x   /  DBili  x   /  AST  x   /  ALT  x   /  AlkPhos  x   -04             Patient seen and evaluated at bedside    Chief Complaint: GIB     HPI:  66M with PMHx of hypertension, ESRD pending renal transplant, CAD (post-stents and CABG in 2020), T2DM, and prior CVA in 2019 p/w dark stool and drop in hgb from baseline from dialysis center. Currently, pt is cp free, no dyspnea on RA, and denies abdominal discomfort.      PMHx:   Hypertension  Diabetes  Dyslipidemia  CAD (Coronary Artery Disease)  CRI (Chronic Renal Insufficiency)  Hypothyroidism  CVA (cerebral vascular accident)  Anemia  PEG (percutaneous endoscopic gastrostomy) status  Intubation of airway performed without difficulty  ESRD on dialysis    PSHx:   History of insertion of stent into coronary artery bypass graft  S/P CABG x 3    Allergies:  No Known Allergies    Current Medications:   acetaminophen     Tablet .. 650 milliGRAM(s) Oral every 6 hours PRN  amLODIPine   Tablet 10 milliGRAM(s) Oral daily  atorvastatin 80 milliGRAM(s) Oral at bedtime  carvedilol 12.5 milliGRAM(s) Oral every 12 hours  chlorhexidine 2% Cloths 1 Application(s) Topical daily  dextrose 40% Gel 15 Gram(s) Oral once  dextrose 5%. 1000 milliLiter(s) IV Continuous <Continuous>  dextrose 5%. 1000 milliLiter(s) IV Continuous <Continuous>  dextrose 50% Injectable 25 Gram(s) IV Push once  dextrose 50% Injectable 12.5 Gram(s) IV Push once  dextrose 50% Injectable 25 Gram(s) IV Push once  epoetin cortney-epbx (RETACRIT) Injectable 80936 Unit(s) IV Push <User Schedule>  glucagon  Injectable 1 milliGRAM(s) IntraMuscular once  insulin lispro (ADMELOG) corrective regimen sliding scale   SubCutaneous three times a day before meals  insulin lispro (ADMELOG) corrective regimen sliding scale   SubCutaneous at bedtime  levETIRAcetam 250 milliGRAM(s) Oral two times a day  levothyroxine 50 MICROGram(s) Oral daily  melatonin 3 milliGRAM(s) Oral at bedtime PRN  ondansetron Injectable 4 milliGRAM(s) IV Push every 8 hours PRN  pantoprazole  Injectable 40 milliGRAM(s) IV Push two times a day  sucralfate suspension 1 Gram(s) Oral every 12 hours    FAMILY HISTORY:  Family history of cancer of tongue (Father)  Parents are  . Father - at 80 years, tongue cancer was a smoker. Mother- at 71, had accident while crossing road. Siblings- 2 brothers and one sister.    Social History: Personally reviewed   No tobacco, EtOH or IVDU     REVIEW OF SYSTEMS:  Constitutional:     [x ] negative [ ] fevers [ ] chills [ ] weight loss [ ] weight gain  HEENT:                  [x ] negative [ ] dry eyes [ ] eye irritation [ ] postnasal drip [ ] nasal congestion  CV:                         [ x] negative  [ ] chest pain [ ] orthopnea [ ] palpitations [ ] murmur  Resp:                     [x ] negative [ ] cough [ ] shortness of breath [ ] dyspnea [ ] wheezing [ ] sputum [ ]hemoptysis  GI:                          [ x] negative [ ] nausea [ ] vomiting [ ] diarrhea [ ] constipation [ ] abd pain [ ] dysphagia   :                        [ x] negative [ ] dysuria [ ] nocturia [ ] hematuria [ ] increased urinary frequency  Musculoskeletal: [x ] negative [ ] back pain [ ] myalgias [ ] arthralgias [ ] fracture  Skin:                       [ x] negative [ ] rash [ ] itch  Neurological:        [ x] negative [ ] headache [ ] dizziness [ ] syncope [ ] weakness [ ] numbness  Psychiatric:           [ x] negative [ ] anxiety [ ] depression  Endocrine:            [ x] negative [ ] diabetes [ ] thyroid problem  Heme/Lymph:      [ x] negative [ ] anemia [ ] bleeding problem  Allergic/Immune: [ x] negative [ ] itchy eyes [ ] nasal discharge [ ] hives [ ] angioedema    [ x] All other systems negative  [ ] Unable to assess ROS due to    Physical Exam:  T(F): 97.9 (-), Max: 98.7 (-)  HR: 66 (-) (62 - 67)  BP: 157/67 (-) (132/69 - 177/71)  RR: 18 (-)  SpO2: 96% (-)    GENERAL: No acute distress, well-developed  HEAD:  Atraumatic, Normocephalic  ENT: conjunctiva and sclera clear, Neck supple, No JVD, moist mucosa  CHEST/LUNG: Clear to auscultation bilaterally; No wheeze, equal breath sounds bilaterally   BACK: No spinal tenderness  HEART: Regular rate and rhythm; No murmurs, rubs, or gallops  ABDOMEN: Soft, Nontender, Nondistended; Bowel sounds present  EXTREMITIES:  No clubbing, cyanosis  PSYCH: Nl behavior, nl affect  NEUROLOGY: AAOx3, non-focal  SKIN: Normal color, No rashes or lesions    Cardiovascular Diagnostic Testing:  [facility] 2020  CORONARY VESSELS: The coronary circulation is left dominant.  LM:   --  Distal left main: There was a 70 % stenosis.  LAD:   --  Ostial LAD: There was a 75 % stenosis.  --  Proximal LAD: There was a 80 % stenosis at the site proximal edge  stent.  CX:   --  Mid circumflex: There was a 40 % stenosis.  --  OM1: There was a 0 % stenosis at the site of a prior stent.  RCA:   --  RCA: The vessel was very small sized. Angiography showed severe  atherosclerosis.    TTE   Conclusions:  1. Moderately dilated left atrium.  LA volume index = 42  cc/m2.  2. Low normal left ventricular systolic function. Septal  motion consistent with cardiac surgery.  3. Moderate right atrial enlargement.  4. Normal right ventricular size and function.  5. Estimated pulmonary artery systolic pressure equals 48  mm Hg, assuming right atrial pressure equals 12 mm Hg,  consistent with mild pulmonary pressures.    Labs: Personally reviewed                        6.4    5.82  )-----------( 214      ( 2022 08:58 )             20.4       Phos  4.0     02-05    TPro  7.0  /  Alb  x   /  TBili  x   /  DBili  x   /  AST  x   /  ALT  x   /  AlkPhos  x   02-04

## 2022-02-07 NOTE — DIETITIAN INITIAL EVALUATION ADULT. - PERTINENT LABORATORY DATA
2/7  Hemoglobin 7.6 <L>  Hematocrit 23.7 <L>  Sodium 124 <L>   BUN 5.3 <H>  Creatinine 8.97<H>   eGFR 6 <L>   Glucose 132 <H>   POCT 141 <H>     2/6   POCT 277, 122, 246, 204 <H>     2/4   A1c 7.3 <H> 2/7  Hemoglobin 7.6 <L>  Hematocrit 23.7 <L>  Sodium 124 <L>   BUN 53 <H>  Creatinine 8.97<H>   eGFR 6 <L>   Glucose 132 <H>   Finger sticks 141 <H>     2/6   Finger sticks 277, 122, 246, 204 <H>     2/4   HbA1c 7.3 <H>

## 2022-02-07 NOTE — DIETITIAN NUTRITION RISK NOTIFICATION - TREATMENT: THE FOLLOWING DIET HAS BEEN RECOMMENDED
Diet, NPO:   NPO for Procedure/Test     NPO Start Date: 08-Feb-2022,   NPO Start Time: 11:59 (02-07-22 @ 12:07) [Ordered]  Diet, NPO after Midnight:      NPO Start Date: 07-Feb-2022,   NPO Start Time: 23:59 (02-07-22 @ 12:07) [Active]  Diet, Clear Liquid (02-03-22 @ 22:37) [Active]      
Labs/EKG

## 2022-02-07 NOTE — DIETITIAN INITIAL EVALUATION ADULT. - OTHER INFO
Patient was previously on clear liquid diet - well tolerated. Currently NPO.     GI: No N/V, however, experiences constipation with last BM 2/6 - recommended ambulating to alleviate constipation.     Reports UBW (1/28) 67kg pre- and 65kg post-dialysis. Of note, dosing weight (2/3) 63.1kg. Per flow sheet (2/4): 63.1kg pre- and 61.1kg post-dialysis. Comparing UBW vs weight (2/4) post-dialysis, 3.9kg (6%) weight loss x 10 days. Patient attributes weight loss due to fluid loss, on HD PTA and in-house.     Labs reviewed: elevated POCT and glucose, A1c 7.3% on 2/4 indicating good glucose management. Patient states use of insulin Lispro, consistent with H&P. Checks blood sugar level x3/day with levels of 170-180 mg/dL post-prandial.     Emphasized importance of adequate PO intake when diet is advanced. Patient was previously on clear liquid diet - well tolerated. Currently NPO.     GI: No N/V, however, experiences constipation with last BM 2/6 - recommended ambulating to alleviate constipation.     Reports UBW (1/28) 67kg pre- and 65kg post-dialysis. Of note, dosing weight (2/3) 63.1kg. Per flow sheet (2/4): 63.1kg pre- and 61.1kg post-dialysis. Comparing UBW vs weight (2/4) post-dialysis, 3.9kg (6%) weight loss x 10 days. Patient attributes weight loss due to fluid loss, on HD PTA and in-house. Will continue to monitor.     Labs reviewed: elevated POCT and glucose, A1c 7.3% on 2/4 indicating good glucose management. Patient states use of insulin Lispro, consistent with H&P. Checks blood sugar level x3/day with levels of 170-180 mg/dL post-prandial. Will continue to monitor.     Emphasized importance of adequate PO intake when diet is advanced. Patient was previously on clear liquid diet - well tolerated. Currently NPO. Patient requested Nepro x2/day when diet is advanced.     GI: No N/V, however, experiences constipation with last BM 2/6.     Reports UBW (1/28) 67kg pre- and 63kg post-dialysis. Of note, dosing weight (2/3) 63.1kg. Per flow sheet (2/4): 63.1kg pre- and 61.1kg post-dialysis. Comparing UBW vs weight (2/4) post-dialysis, 1.9kg (3%) weight loss x 10 days. Patient attributes weight loss due to fluid loss, on HD PTA and in-house. Will continue to monitor as able.     Labs reviewed: elevated finger sticks and glucose, HbA1c 7.3% on 2/4 suggesting good glucose management. Patient states use of insulin Lispro, consistent with H&P. Checks blood sugar level x3/day with levels of 170-180 mg/dL post-prandial. Will continue to monitor as able.     Emphasized importance of adequate PO intake when diet is advanced. Patient denies any questions or concerns at this time.

## 2022-02-07 NOTE — DIETITIAN INITIAL EVALUATION ADULT. - ORAL INTAKE PTA/DIET HISTORY
Reports good appetite and intake of x3 meals/day of generally well-balanced meals. Limits sugar and sodium intake + states avoiding dairy products, suggesting adherence to renal nutrition therapy. Confirms NKFA. Reports use of vitamins (unable to specify types) and Nepro x2/day (850 orestes/38.2 g protein). No chewing or swallowing problems Reports good appetite and intake of x3 meals/day of generally well-balanced meals. Limits sugar and sodium intake + states avoiding dairy products, suggesting adherence to renal nutrition therapy. Confirms NKFA. Reports use of vitamins (unable to specify types) and Nepro x2/day (850 orestes/38.2 g protein). No chewing problems. States coughing on clear liquid diet.

## 2022-02-07 NOTE — DIETITIAN INITIAL EVALUATION ADULT. - OTHER CALCULATIONS
Based on post-dialysis weight on 2/4 (61.1 kg) with consideration of age, malnutrition and HD. Fluid needs deferred to team.

## 2022-02-07 NOTE — CONSULT NOTE ADULT - SUBJECTIVE AND OBJECTIVE BOX
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 Pt states he has been having very dark stools for roughly 1 week. Went in for labwork today and was told he has very low hgb. Pt states he usually gets EPO at dialysis. Sent in from nephrology office for the recorded. Anemia reportedly at office number was around 6.7. Pt endorses COLE and generalized weakness but denies chest pain, dizziness, syncope or palpitations. Denies any fevers chills. Slight cough only after arrival to ER. Still makes some urine.     In ER: Given keppra 250mg, PPI 80 IV (2022 21:55)    Hematology/Oncology consulted for patient with renal failure S/P renal transplant  found to have marked anemia as well as GI bleeding (melena). Patient transfused PRBC's and given iron since admission. Gets exogenous erythropoietin with HD (3X weekly).    PAST MEDICAL & SURGICAL HISTORY:  Hypertension    Diabetes    Dyslipidemia    CAD (Coronary Artery Disease)  with Stents in 2009 and 2019, s/p off-pump C3L on 20    Hypothyroidism    CVA (cerebral vascular accident)  19 with residual bilateral weakness    Anemia    PEG (percutaneous endoscopic gastrostomy) status  removed 2020    Intubation of airway performed without difficulty  Dec 2019  CVA c/b status epilepticus requiring intubation and PEG in 2019-    ESRD on dialysis  M/W/F    History of insertion of stent into coronary artery bypass graft   and     S/P CABG x 3  off pump C3L on 20        FAMILY HISTORY:  Family history of cancer of tongue (Father)  Parents are  . Father - at 80 years, tongue cancer was a smoker. Mother- at 71, had accident while crossing road. Siblings- 2 brothers and one sister.        Alcohol Denied  Smoking: Nonsmoker  Drug Use: Denied  Marital Status:         Allergies    No Known Allergies    Intolerances        MEDICATIONS  (STANDING):  amLODIPine   Tablet 10 milliGRAM(s) Oral daily  atorvastatin 80 milliGRAM(s) Oral at bedtime  carvedilol 12.5 milliGRAM(s) Oral every 12 hours  chlorhexidine 2% Cloths 1 Application(s) Topical daily  dextrose 40% Gel 15 Gram(s) Oral once  dextrose 5%. 1000 milliLiter(s) (50 mL/Hr) IV Continuous <Continuous>  dextrose 5%. 1000 milliLiter(s) (100 mL/Hr) IV Continuous <Continuous>  dextrose 50% Injectable 25 Gram(s) IV Push once  dextrose 50% Injectable 12.5 Gram(s) IV Push once  dextrose 50% Injectable 25 Gram(s) IV Push once  epoetin cortney-epbx (RETACRIT) Injectable 63572 Unit(s) IV Push <User Schedule>  glucagon  Injectable 1 milliGRAM(s) IntraMuscular once  insulin lispro (ADMELOG) corrective regimen sliding scale   SubCutaneous three times a day before meals  insulin lispro (ADMELOG) corrective regimen sliding scale   SubCutaneous at bedtime  levETIRAcetam 250 milliGRAM(s) Oral two times a day  levothyroxine 50 MICROGram(s) Oral daily  pantoprazole  Injectable 40 milliGRAM(s) IV Push two times a day  sucralfate suspension 1 Gram(s) Oral every 12 hours    MEDICATIONS  (PRN):  acetaminophen     Tablet .. 650 milliGRAM(s) Oral every 6 hours PRN Temp greater or equal to 38C (100.4F), Mild Pain (1 - 3)  melatonin 3 milliGRAM(s) Oral at bedtime PRN Insomnia  ondansetron Injectable 4 milliGRAM(s) IV Push every 8 hours PRN Nausea and/or Vomiting      ROS  No fever, sweats, chills  No epistaxis, HA, sore throat  No CP, SOB, cough, sputum  No n/v/d, abd pain, melena, hematochezia  No edema  No rash  No anxiety  No back pain, joint pain  No bleeding, bruising  No dysuria, hematuria    T(C): 36.4 (22 @ 12:54), Max: 36.8 (22 @ 17:47)  HR: 63 (22 @ 12:54) (55 - 63)  BP: 160/74 (22 @ 12:54) (124/63 - 170/72)  RR: 18 (22 @ 12:54) (18 - 18)  SpO2: 98% (22 @ 12:54) (98% - 100%)  Wt(kg): --    PE  NAD  Awake, alert  Anicteric, MMM  RRR  CTAB  Abd soft, NT, ND  No c/c/e  No rash grossly                            7.6    6.07  )-----------( 208      ( 2022 06:36 )             23.7       02-07    124<L>  |  88<L>  |  53<H>  ----------------------------<  132<H>  5.0   |  20<L>  |  8.97<H>    Ca    8.5      2022 06:35

## 2022-02-07 NOTE — PROVIDER CONTACT NOTE (CRITICAL VALUE NOTIFICATION) - BACKGROUND
pt w/ anemia. presents w/ COLE and weakness.
65 y/o male PMH ESRD on HD M/W/F. Here for GI bleed. Reported with critical values of HGB 6.0 HCT 19.8

## 2022-02-07 NOTE — DIETITIAN INITIAL EVALUATION ADULT. - SIGNS/SYMPTOMS
as evidenced by moderate muscle/fat depletion and meeting </=50% of EER for>/= 5 days as evidenced by HD

## 2022-02-07 NOTE — CHART NOTE - NSCHARTNOTEFT_GEN_A_CORE
Informed by RN that pt possibly had a seizure. Per RN, pt sat up in bed and had temporary moment of confusion with noted garbled speech. Per pt's son, this is typically how his seizures present. Pt seen and assessed at bedside, in no acute distress. He was sitting up in bed, mentating well, with no complaints, A&Ox3. Per RN, during seizure, pt did not have convulsions, staring spell, stiffening of the body, or loss of bowel or bladder control. Pt with h/o seizure disorder for which he is on Keppra 250 mg PO BID. Denies dizziness, numbness and tingling, HA, and does not appear to be exhibiting further signs of confusion. Per two sons at bedside, he is back to his baseline mental status.    Pt also hyponatremic this AM to 124. VBG ordered and informed by RN that lactate was 3.1.    Vital Signs Last 24 Hrs  T(C): 36.4 (07 Feb 2022 17:00), Max: 36.7 (06 Feb 2022 21:06)  T(F): 97.5 (07 Feb 2022 17:00), Max: 98.1 (06 Feb 2022 21:06)  HR: 62 (07 Feb 2022 17:00) (55 - 63)  BP: 152/64 (07 Feb 2022 17:00) (124/63 - 160/74)  BP(mean): 99 (07 Feb 2022 17:00) (88 - 107)  RR: 18 (07 Feb 2022 17:00) (18 - 18)  SpO2: 100% (07 Feb 2022 17:00) (98% - 100%)    Physical Exam:  General: WN/WD NAD  Eyes: EOMs intact, PERRLA  Neurology: A&Ox3, + b/l hand , 5/5 strength in b/l upper and lower extremities   Head:  Normocephalic, atraumatic  Respiratory: CTA B/L, no wheeze or rales  CV: RRR, S1S2  Abdominal: Soft, NT, ND  MSK: FROM all 4 extremities    Labs:                          7.6    6.07  )-----------( 208      ( 07 Feb 2022 06:36 )             23.7     02-07    124<L>  |  88<L>  |  53<H>  ----------------------------<  132<H>  5.0   |  20<L>  |  8.97<H>    Ca    8.5      07 Feb 2022 06:35        Assessment & Plan:  67 yo M w/ PMHx of HTN, ESRD on HD MWF, CAD (post-stents and CABG in August 2020), IDDM2, prior CVA (December 2019 with residual right hand weakness) on Keppra and paroxysmal atrial fibrillation on Eliquis, admitted for GIB workup. Now s/p seizure with an elevated lactate.    Seizure  >Moved PM dose of 250 mg Keppra PO to be given now  >Per son, pt is supposed to get 125 mg of Keppra PO x 1 post-dialysis, which was made as a standing order  >Maintain seizure precautions  >Will continue to monitor for additional episodes  >Plan of care discussed with two sons at bedside and medical attending    Elevated lactate  >Lactate 3.1  >Likely elevated in the setting of seizure  >Will repeat lactate in AM and continue to trend  >Discussed with medical attending    Hyponatremia  >VBG sent with repeat Na to 125  >Will follow up nephrology recommendations  >Discussed with medical attending      Emmanuelle Dickerson PA-C  [id number]

## 2022-02-07 NOTE — PROVIDER CONTACT NOTE (CHANGE IN STATUS NOTIFICATION) - SITUATION
pt w/ gabi speak. pt son at bedside. He states " my father is having a seizure". PA Allison called to beside.

## 2022-02-07 NOTE — DIETITIAN INITIAL EVALUATION ADULT. - ADD RECOMMEND
1. Team to advance diet when medically feasible. Recommend renal and consistent carbohydrate diet with Nepro x2/day 2. Continue to monitor labs, skin integrity, weight and GI distress 3. Provide education as needed/able 4. Malnutrition notification placed 1. Team to advance diet when medically feasible. Recommend renal and consistent carbohydrate diet with Nepro x2/day 2. Recommend addition of NephroVite if not medically contraindicated 3. Continue to monitor labs, skin integrity, weight and GI distress 4. Provide education as needed/able 5. Malnutrition notification placed 1. Team to advance diet when medically feasible. Recommend renal and consistent carbohydrate diet with Nepro x2/day. Defer diet's texture to team/SLP 2. Recommend addition of NephroVite if not medically contraindicated 3. Continue to monitor labs, skin integrity, weight and GI distress 4. Provide education as needed/able 5. Malnutrition notification placed

## 2022-02-07 NOTE — DIETITIAN INITIAL EVALUATION ADULT. - ETIOLOGY
related to NPO/clear liquid x5 days in the setting of increased nutrient needs due to HD related to increased physiological demands for nutrients related to NPO/clear liquid x5 days

## 2022-02-07 NOTE — DIETITIAN INITIAL EVALUATION ADULT. - PHYSCIAL ASSESSMENT
%IBW (utilized post-dialysis weight on 2/4): 84%  Per nursing documentation, no pressure injury   NFPE conducted with patient's verbal consent and preceptor presence %IBW (utilized post-dialysis weight on 2/4): 84%  Per nursing documentation, no pressure injury   Nutrition focused physical exam conducted with patient's verbal consent and preceptor presence

## 2022-02-07 NOTE — DIETITIAN INITIAL EVALUATION ADULT. - PERTINENT MEDS FT
Insulin Lispro   Norvasc   Coreg   Lipitor   Retacrit   Carafate   Keppra   Synthroid   Zofran   Protonix

## 2022-02-07 NOTE — CONSULT NOTE ADULT - ATTENDING COMMENTS
65 y/o male admitted with anemia. Workup as above. Transferrin saturation < 30% and thus may benefit from additional iron supplementation. Also noted positive fecal occult blood. If severe anemia persists outpatient may consider bone marrow biopsy. \\Will continue to follow.
66 year old male with past medical history of hypertension, ESRD pending renal transplant, CAD (s/p stents and CABG August 2020), T2DM, and prior CVA 2019 presenting with GIB and acute blood loss anemia; plan for scoping by GI.     Pre-operative/procedural Optimization  RCRI 4; 15% 30-day risk of MI, cardiac arrest, and/or death [CAD, CVA/TIA, insulin, Cr >2]  Functional status METs ~4  No absolute contraindications including active chest pain, decompensated heart failure, and/or life threatening arrythmia.    **Intermediate risk patient undergoing low risk surgery/procedure OPTIMIZED to the lowest risk predicted by the RCRI base on co-morbidities and nature of operation/procedure.   **No further cardiac testing indicated.   **Continue medical management with carvedilol 12.5 mg BID, atorvastatin 80 mg nightly, amlodipine 10 mg daily. Can consider addition of ACEi post procedure if BP suboptimal given that he is already on HD; awaiting kidney transplant.   **Holding A/C but should resume when reasonable post procedures.   **Resume aspirin 81 mg daily as soon as possible weighing risks and benefits. Less likely to be contributing to the GIB and would suggest starting first to challenge him.     Please call with questions. No active cardiac issues ongoing.     Jose Maria Moise MD, MPH, PEREZ, RPVI, [facility]  Inpatient Cardiovascular Specialist; Julia Mckenzie [facility], [facility] ([facility]  ; [employer] [facility]  message: [employer] [phone] or Microsoft Teams (text preferred and/or call)  email: [email or web address] on-service contact/call information, go to [email or web address] and use "[email or web address]" to login.  Outpatient Cardiology appointments, call  [phone] to arrange with a colleague; I do not have outpatient Cardiology clinic.

## 2022-02-07 NOTE — DIETITIAN INITIAL EVALUATION ADULT. - REASON INDICATOR FOR ASSESSMENT
Pt seen for NPO/clear liquids x5 days   Source: EMR and patient Pt seen for NPO/clear liquids >4 days   Source: EMR and patient

## 2022-02-07 NOTE — CONSULT NOTE ADULT - ASSESSMENT
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 Pt states he has been having very dark stools for roughly 1 week. Went in for labwork today and was told he has very low hgb. Pt states he usually gets EPO at dialysis. Sent in from nephrology office for the recorded. Anemia reportedly at office number was around 6.7. Pt endorses COLE and generalized weakness but denies chest pain, dizziness, syncope or palpitations. Denies any fevers chills. Slight cough only after arrival to ER. Still makes some urine.     In ER: Given keppra 250mg, PPI 80 IV (03 Feb 2022 21:55)    Hematology/Oncology consulted for patient with renal failure S/P renal transplant 2015 found to have marked anemia as well as GI bleeding (melena). Patient transfused PRBC's and given iron since admission. Gets exogenous erythropoietin with HD (3X weekly).    Anemia  --Workup fails to show iron, B12, folate deficiencies or hemolysis  --Myeloma workup pending  --Most likely secondary to anemia of chronic renal disease  --Given recent GI bleed, patient given IV iron 1 dose  Erythropoietin 16865 units being given with HD  --Patient may benefit from increasing dose of Erythropoietin  --GI has been consulted for GI bleed workup  --Please transfuse PRBC's if Hgb <7.0 grams    After discharge patient may follow with Dr[facility]    Thank you for the opportunity to participate in Mr. Castelan's care.    Robert Martinez PA-C  Hematology/Oncology  [facility]   [phone] (cell)  [phone] (office)  [phone] (alt office)  Evenings and weekends please call MD on call or office

## 2022-02-07 NOTE — DIETITIAN INITIAL EVALUATION ADULT. - CHIEF COMPLAINT
66 years old male with PMH of "HTN, ESRD on HD MWF, CAD (post-stents and CABG in August 2020), IDDM2, prior CVA (December 2019 with residual right hand weakness) on Keppra, paroxysmal atrial fibrillation on Eliquis, hypothyroidism and plans for kidney transplant" per H&P. Admitted due to melena and anemia. Per internal (2/6): anemia has been worsening due to acute GI blood loss, [facility] transfusion in progress and EGD plan today 2/7. Currently, on HD MWF per nephrology (2/7).

## 2022-02-07 NOTE — PROVIDER CONTACT NOTE (CRITICAL VALUE NOTIFICATION) - ASSESSMENT
Pt resting in bed AAOx4. No complaints of pain. No signs of distress noted. No active signs of bleeding.
pt a&o4. VSS .

## 2022-02-09 NOTE — CHART NOTE - NSCHARTNOTEFT_GEN_A_CORE
MEDICINE PA NOTE     S/p EGD today showing multiple non-bleeding gastric and duodenal ulcers, gastric ulcers biopsied. Advanced to regular diet. As per GI and medicine attending, will start heparin gtt to confirm tolerance to anticoagulation. Once confirmed, discontinue heparin and resume Eliquis and Aspirin.    WALDO Roque  [id number]

## 2022-02-09 NOTE — PRE-ANESTHESIA EVALUATION ADULT - NSANTHOSAYNRD_GEN_A_CORE
No. NILO screening performed.  STOP BANG Legend: 0-2 = LOW Risk; 3-4 = INTERMEDIATE Risk; 5-8 = HIGH Risk

## 2022-02-09 NOTE — PRE PROCEDURE NOTE - PRE PROCEDURE EVALUATION
Attending Physician:   Dr. Witt                         Procedure:   EGD    Indication for Procedure:   Melena  ________________________________________________________  PAST MEDICAL & SURGICAL HISTORY:    Hypertension  Diabetes  Dyslipidemia  CAD (Coronary Artery Disease) with Stents in 06/2009 and 6/2019, s/p off-pump C3L on 8/13/20  Hypothyroidism  CVA (cerebral vascular accident)-12/13/19 with residual bilateral weakness  Anemia    Intubation of airway performed without difficulty Dec 2019  CVA c/b status epilepticus requiring intubation and PEG in 12/2019-removed July 2020  ESRD on dialysis M/W/F  History of insertion of stent into coronary artery bypass graft 2009 and 2019  S/P CABG x 3 off pump C3L on 8/13/20      ALLERGIES:  No Known Allergies    HOME MEDICATIONS:  amLODIPine 5 mg oral tablet: 1 tab(s) orally once a day  apixaban 2.5 mg oral tablet: 1 tab(s) orally 2 times a day  aspirin 81 mg oral delayed release tablet: 1 tab(s) orally once a day  atorvastatin 80 mg oral tablet: 1 tab(s) orally once a day (at bedtime)  insulin lispro 100 units/mL injectable solution: 2 Unit(s) if Glucose 151 - 200  4 Unit(s) if Glucose 201 - 250  6 Unit(s) if Glucose 251 - 300  8 Unit(s) if Glucose 301 - 350  10 Unit(s) if Glucose 351 - 400  12 Unit(s) if Glucose Greater Than 400  levETIRAcetam 250 mg oral tablet: 1 tab(s) orally 2 times a day except 125mg after dialysis  sevelamer hydrochloride 800 mg oral tablet: 1 tab(s) orally 3 times a day    AICD/PPM: [ ] yes   [X ] no    PERTINENT LAB DATA:                        7.6    5.25  )-----------( 201      ( 09 Feb 2022 06:20 )             24.1     02-09    129<L>  |  92<L>  |  20  ----------------------------<  120<H>  3.9   |  25  |  5.68<H>    Ca    8.4      09 Feb 2022 06:20  Phos  4.4     02-08  Mg     2.0     02-09    PHYSICAL EXAMINATION:    T(C): 36.5  HR: 54  BP: 144/66  RR: 18  SpO2: 97%    Constitutional: NAD    Neck:  No JVD  Respiratory: CTAB/L  Cardiovascular: S1 and S2  Gastrointestinal: BS+, soft, NT/ND  Extremities: No peripheral edema  Neurological: A/O x 4      COMMENTS:    The patient is a suitable candidate for the planned procedure unless box checked [ ]  No, explain:

## 2022-02-10 NOTE — PHYSICAL THERAPY INITIAL EVALUATION ADULT - PRECAUTIONS/LIMITATIONS, REHAB EVAL
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, [place] [address], phone number for appointment: [phone] no known precautions/limitations

## 2022-02-10 NOTE — PHYSICAL THERAPY INITIAL EVALUATION ADULT - LEVEL OF INDEPENDENCE: GAIT, REHAB EVAL
Patient:   BENJAMÍN SHAFER            MRN: [id number]            FIN: [id number]              Age:   81 years     Sex:  MALE     :  37   Associated Diagnoses:   None   Author:   DENIS MATHEW     Subjective    Date Of Service: 2/3/20  Awake, alert, nonverbal, very weak  on NC;  cc so far; fluid balance remains positive. Renal function unchanged.  Marked ascites.   Palliative seen patient.  Pt came from SNF.     Health Status   Allergies:    Allergies (1) Active Reaction  cyclobenzaprine None Documented    Current medications: Medications (6) Active  Scheduled: (3)  azithromycin-NaCl 0.9%  500 mg 250 mL, IVPB, Daily  cefTRIAXone  1,000 mg 10 mL, Slow IV Push, Daily  Heparin 5,000 unit/1 mL inj  5,000 unit 1 mL, Subcutaneous, Q8H  Continuous: (2)  Dextrose 5% 1,000 mL  1,000 mL, IV, 125 mL/hr  NORepinephrine 8 mg [5 mcg/min] + premixed in Sodium Chloride 0.9% 250 mL  250 mL, IV, 9.38 mL/hr  PRN: (1)  Acetaminophen 650 mg suppos  650 mg 1 suppository, Rectal, Q6H        Objective   Intake and Output   I & O between:  2020 10:09 TO 2020 10:09  Med Dosing Weight:  84.2  kg   2020  24 Hour Intake:   2322.20  ( 27.58 mL/kg )  24 Hour Output:   830.00           24 Hour Urine/Stool Output:   0.0  24 Hour Balance:   1492.20           24 Hour Urine Output:   830.00  ( 0.41 mL/kg/hr )     VS/Measurements   Vitals between:   2020 10:09:17   TO   2020 10:09:17                   LAST RESULT MINIMUM MAXIMUM  Temperature 36.5 36.1 36.7  Heart Rate 74 68 76  Respiratory Rate 16 13 20  NISBP           91 84 93  NIDBP           52 46 63  NIMBP           65 61 72  SpO2                    96 94 100      Lines and Tubes:   Lines, Tubes, and Drains   PIVs   Antecubital Right inserted 2 days ago.  Upper Arm Right inserted 2 days ago.  Urinary Catheter   Schilling 16 Fr. inserted 1 day ago.   .    General:  Alert and oriented, No acute distress.    Eye:  Pupils are equal, round and  reactive to light.    Neck:  No carotid bruit, No jugular venous distention.    Respiratory:  Lungs are clear to auscultation, Respirations are non-labored, Breath sounds are equal.   Cardiovascular:  Normal rate, Regular rhythm, Good pulses equal in all extremities.   Gastrointestinal:  Soft, Non-tender, Normal bowel sounds.    Integumentary:  Warm, Dry.    Neurologic:  Alert, Oriented, Normal sensory, Normal motor function.   more alert   distended soft abd   clear lungs but occasional cough      Results Review   General results   Interpretation:               Labs between:  02-FEB-2020 10:09 to 03-FEB-2020 10:09  CBC:                 WBC  HgB  Hct  Plt  MCV  RDW   03-FEB-2020 8.4  (L) 8.2  (L) 28.8  212  92.3  (H) 25.2   BMP:                 Na  Cl  BUN  Glu   03-FEB-2020 (H) 158  (H) 130  (H) 112  (H) 183                              K  CO2  Cr  Ca                              3.6  (L) 20  (H) 3.81  (L) 7.5   BMP:                 Na  Cl  BUN  Glu   02-FEB-2020 (!) 162  (H) 130  (H) 108  (H) 187                              K  CO2  Cr  Ca                              3.7  (L) 20  (H) 3.98  (L) 7.7   BMP:                 Na  Cl  BUN  Glu   02-FEB-2020 (!) 161  (H) 133  (H) 114  (H) 237                              K  CO2  Cr  Ca                              3.6  21  (H) 4.25  (L) 7.7   Other Chem:             Mg  Phos  Triglycerides  GGTP  DirectBili                           1.9           POC GLU:                 Latest Result  Latest Date  Minimum  Min Date  Maximum  Max Date                             (H) 151  03-FEB-2020 (H) 151  03-FEB-2020 (H) 194  02-FEB-2020                       Impression and Plan     Impression:   Severe hypernatremia of 170 - down to 158 now  Severe dehydration   CRISTINO, likely pre-renal. Renal function remains unchanged   AMS, toxic metabolic,   -due to hyperNA, dehydration, and PNA  -less likely hepatic encephalopathy--latest ammonia<10  Possible pneumonia left lung (no fever or  leukocytosis however)   -Lactic acid < 2   Hepatic cirrhosis with associated ascites  -unclear etiology--ALBERTS vs alcoholic?  Moderate to severe protein-bill malnutrition   Anemia of 8.2 today - slowly down trending with correction of dehydration  Recommendations:   Palliative to eval and treat-seen today  Renal US c/w renal disease ac/vs.chronic; hydronephrosis ruled out  Rocephin, azithromycin to be continued for PNA  Follow renal rec's re: hydration and fluid balance; not a candidate for RRT  Will keep off IVF given positive balance  Pressor on standby to keep MAP equal or >65  Will add midodrine TID for pressure support in the meantime  Trend labs.  NPO until more alert  Family refusing NGT placement  ST to eval and proceed with swallow study if indicated when more alert and able to follow commands  Oral care   Strict I/O  Schilling  Prognosis guarded  LET status: OK for pressors, meds only  CCT 34 min   D/w Dr Deonte Trevino NP  Patient is seen and examined independently.  Discussed with Pulmonary nurse practitioner  Agree with the physical and exam. Treatment plan is reviewed and developed.  I participated in the following activities of this patients care: medical decision making.   contact guard

## 2022-02-10 NOTE — PHYSICAL THERAPY INITIAL EVALUATION ADULT - ADDITIONAL COMMENTS
Patient lives in private house with wife and children with 5 steps. PTA patient  independent with ADLs and diabetic care. Patient uses walker for outside and owns cane.

## 2022-02-10 NOTE — PHYSICAL THERAPY INITIAL EVALUATION ADULT - PERTINENT HX OF CURRENT PROBLEM, REHAB EVAL
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 EGD on 2/9/22 shows multiple gastric ulcers, no active bleeding at this time. These were the reason for acute blood loss anemia.

## 2022-02-10 NOTE — PHYSICAL THERAPY INITIAL EVALUATION ADULT - PLANNED THERAPY INTERVENTIONS, PT EVAL
LTG 1: Stairs - Pt will be independent with negotiation of 5 steps w/ handrail within 4 weeks./balance training/bed mobility training/gait training/transfer training

## 2022-02-11 NOTE — CHART NOTE - NSCHARTNOTEFT_GEN_A_CORE
Pt's H/H is stable, Heparin drip was switched to Eliquis 5mg BID per Dr. Flores and cleared by GI (hold off on ASA).  Discharge in AM if H/H is stable on Eliquis      [id number]

## 2022-02-11 NOTE — PROGRESS NOTE ADULT - PROBLEM SELECTOR PLAN 6
Hx of seizure prophylaxis s/p CVA  -Cont. keppra 250mg BID except 150mg after dialysis
Hx of seizure prophylaxis s/p CVA  -Cont. keppra 250mg BID, may need neurology input if seizure recurs.
Hx of seizure prophylaxis s/p CVA  -Cont. keppra 250mg BID except 150mg after dialysis
Hx of seizure prophylaxis s/p CVA  -Cont. keppra 250mg BID except 150mg after dialysis
Hx of seizure prophylaxis s/p CVA  -Cont. keppra 250mg BID, may need neurology input if seizure recurs.

## 2022-02-11 NOTE — PROGRESS NOTE ADULT - PROBLEM SELECTOR PLAN 2
IV Iron ordered. Given that he is a candidate for Renal transplant, would like to limit transfusion
CBC is stable
FU post transfusion CBC
FU post transfusion CBC
Anemia has worsened, from acute GI blood loss. PRBC transfusion in progress.
IV Iron ordered. Given that he is a candidate for Renal transplant, would like to limit transfusion
CBC is stable
CBC is stable

## 2022-02-11 NOTE — PROGRESS NOTE ADULT - PROBLEM SELECTOR PLAN 1
EGD shows multiple gastric ulcers, no active bleeding at this time. These were the reason for acute blood loss anemia.  CBC is stable  Restarted on Eliquis
GI consult noted.  Cont PPI  CBC is stable. IV iron started.
GI consult noted. EGD planned once Hyponatremia improves. Ser Na 126 today, renal is foloowing  Cont PPI
GI consult noted. EGD planned once Hyponatremia improves  Cont PPI
EGD shows multiple gastric ulcers, no active bleeding at this time. These were the reason for acute blood loss anemia.  Trial of A/C, started on IV Heparin.
GI consult awaited. Cont PPI
EGD shows multiple gastric ulcers, no active bleeding at this time. These were the reason for acute blood loss anemia.  Trial of A/C, started on IV Heparin.  FU CBC
GI consult noted. EGD planned for tomorrow.  Cont PPI

## 2022-02-11 NOTE — PROGRESS NOTE ADULT - PROBLEM SELECTOR PLAN 8
-Cont. amlodipine with hold parameters  -Increase Coreg 12.5 mg with hold parameters
-Cont. amlodipine with hold parameters  -Increase Coreg 12.5 mg with hold parameters
-Trend BP  -Cont. amlodipine with hold parameters  -Cont. Coreg with hold parameters
-Cont. amlodipine with hold parameters  -Increase Coreg 12.5 mg with hold parameters

## 2022-02-11 NOTE — PROGRESS NOTE ADULT - PROBLEM SELECTOR PLAN 5
On sliding scale at home  -Fingersticks and sliding scale

## 2022-02-11 NOTE — PROGRESS NOTE ADULT - PROBLEM SELECTOR PROBLEM 3
ESRD on dialysis

## 2022-02-11 NOTE — PROGRESS NOTE ADULT - PROBLEM SELECTOR PLAN 3
On HD TTS? vs MWF?  -Nephrology consultnoted
On HD TTS  -Nephrology FU noted
On HD TTS  -Nephrology FU noted
On HD TTS  -Nephrology consult noted
On HD TTS? vs MWF?  -Nephrology consult noted
On HD TTS  -Nephrology FU noted

## 2022-02-11 NOTE — PROGRESS NOTE ADULT - PROBLEM SELECTOR PLAN 7
S/p CABG 2020  -Cont. statin  -Holding asa in setting of possible GIB  -Cont. BB

## 2022-02-11 NOTE — PROGRESS NOTE ADULT - PROBLEM SELECTOR PLAN 4
-Holding eliquis in setting of GIB  -Cont. Coreg with hold parameters
-Holding eliquis in setting of possible GIB  -Cont. Coreg with hold parameters
-Holding eliquis in setting of GIB  -Cont. Coreg with hold parameters
-Holding eliquis in setting of GIB  -Cont. Coreg with hold parameters
-Holding eliquis in setting of possible GIB  -Cont. Coreg with hold parameters
-Holding eliquis in setting of possible GIB  -Cont. Coreg with hold parameters
-Holding eliquis in setting of GIB  -Cont. Coreg with hold parameters
-Holding eliquis in setting of GIB  -Cont. Coreg with hold parameters

## 2022-02-12 NOTE — DISCHARGE NOTE NURSING/CASE MANAGEMENT/SOCIAL WORK - PATIENT PORTAL LINK FT
You can access the FollowMyHealth Patient Portal offered by [facility] by registering at the following website: [email or web address]. By joining [employer]’s FollowMyHealth portal, you will also be able to view your health information using other applications (apps) compatible with our system.

## 2022-02-12 NOTE — DISCHARGE NOTE PROVIDER - NSDCMRMEDTOKEN_GEN_ALL_CORE_FT
amLODIPine 5 mg oral tablet: 1 tab(s) orally once a day  apixaban 2.5 mg oral tablet: 1 tab(s) orally 2 times a day  atorvastatin 80 mg oral tablet: 1 tab(s) orally once a day (at bedtime)  carvedilol 6.25 mg oral tablet: 1 tab(s) orally every 12 hours  insulin lispro 100 units/mL injectable solution: 2 Unit(s) if Glucose 151 - 200  4 Unit(s) if Glucose 201 - 250  6 Unit(s) if Glucose 251 - 300  8 Unit(s) if Glucose 301 - 350  10 Unit(s) if Glucose 351 - 400  12 Unit(s) if Glucose Greater Than 400  levETIRAcetam 250 mg oral tablet: 1 tab(s) orally 2 times a day except 125mg after dialysis  levothyroxine 50 mcg (0.05 mg) oral tablet: 1 tab(s) orally once a day  Retacrit 10,000 units/mL injectable solution: injectable 3 times a week INTRADIALYSIS  sevelamer hydrochloride 800 mg oral tablet: 1 tab(s) orally 3 times a day   amLODIPine 10 mg oral tablet: 1 tab(s) orally once a day  apixaban 2.5 mg oral tablet: 1 tab(s) orally 2 times a day  atorvastatin 80 mg oral tablet: 1 tab(s) orally once a day (at bedtime)  Coreg 12.5 mg oral tablet: 1 tab(s) orally 2 times a day  insulin lispro 100 units/mL injectable solution: 2 Unit(s) if Glucose 151 - 200  4 Unit(s) if Glucose 201 - 250  6 Unit(s) if Glucose 251 - 300  8 Unit(s) if Glucose 301 - 350  10 Unit(s) if Glucose 351 - 400  12 Unit(s) if Glucose Greater Than 400  levETIRAcetam 250 mg oral tablet: 1 tab(s) orally 2 times a day and an extra dose of 125mg dialysis on dialysis days  levothyroxine 50 mcg (0.05 mg) oral tablet: 1 tab(s) orally once a day  montelukast 10 mg oral tablet: 1 tab(s) orally once a day  pantoprazole 40 mg oral delayed release tablet: 1 tab(s) orally once a day (before a meal)  Retacrit 10,000 units/mL injectable solution: injectable 3 times a week INTRADIALYSIS  sevelamer hydrochloride 800 mg oral tablet: 1 tab(s) orally 3 times a day  sucralfate 1 g/10 mL oral suspension: 10 milliliter(s) orally every 12 hours   amLODIPine 10 mg oral tablet: 1 tab(s) orally once a day  apixaban 2.5 mg oral tablet: 1 tab(s) orally 2 times a day  atorvastatin 80 mg oral tablet: 1 tab(s) orally once a day (at bedtime)  Coreg 12.5 mg oral tablet: 1 tab(s) orally 2 times a day  insulin lispro 100 units/mL injectable solution: 2 Unit(s) if Glucose 151 - 200  4 Unit(s) if Glucose 201 - 250  6 Unit(s) if Glucose 251 - 300  8 Unit(s) if Glucose 301 - 350  10 Unit(s) if Glucose 351 - 400  12 Unit(s) if Glucose Greater Than 400  levETIRAcetam 250 mg oral tablet: 1 tab(s) orally 2 times a day and an extra dose of 125mg dialysis on dialysis days  levothyroxine 50 mcg (0.05 mg) oral tablet: 1 tab(s) orally once a day  montelukast 10 mg oral tablet: 1 tab(s) orally once a day  Outpatient Physical Therapy:   pantoprazole 40 mg oral delayed release tablet: 1 tab(s) orally once a day (before a meal)  Retacrit 10,000 units/mL injectable solution: injectable 3 times a week INTRADIALYSIS  sevelamer hydrochloride 800 mg oral tablet: 1 tab(s) orally 3 times a day  sucralfate 1 g oral tablet: 1 tab(s) orally 2 times a day

## 2022-02-12 NOTE — PROGRESS NOTE ADULT - REASON FOR ADMISSION
Melena and anemia

## 2022-02-12 NOTE — DISCHARGE NOTE PROVIDER - DETAILS OF MALNUTRITION DIAGNOSIS/DIAGNOSES
This patient has been assessed with a concern for Malnutrition and was treated during this hospitalization for the following Nutrition diagnosis/diagnoses:     -  02/07/2022: Severe protein-calorie malnutrition

## 2022-02-12 NOTE — DISCHARGE NOTE NURSING/CASE MANAGEMENT/SOCIAL WORK - NSDCPEFALRISK_GEN_ALL_CORE
For information on Fall & Injury Prevention, visit: [email or web address] OR  [email or web address] OR  https://www.cdc.gov/steadi/patient.html

## 2022-02-12 NOTE — PROGRESS NOTE ADULT - PROVIDER SPECIALTY LIST ADULT
Gastroenterology
Gastroenterology
Nephrology
Heme/Onc
Nephrology
Gastroenterology
Nephrology
Gastroenterology
Nephrology
Nephrology
Internal Medicine

## 2022-02-12 NOTE — PROGRESS NOTE ADULT - ASSESSMENT
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   Nephrology consulted for ESRD management  Pt goes to [facility]     ESRD on HD ( MWF ) via tunnelled HD catehter  Outpt unit [facility]  continue HD MWF   Consent in chart    Anemia  IGOR with HD  MOnitor Hb  COnsider GI /  Heme eval    HTN  Bp elevated  UF with HD  Increase amlodipine to 10mg QD today    CKD -BMD   PTH and PO4 acceptable  MOnitor serum calcium and PO4 daily   
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   Nephrology consulted for ESRD management  Pt goes to [facility]     ESRD on HD ( MWF ) via tunnelled HD catehter  Outpt unit [facility]  continue HD MWF   Consent in chart    Anemia  IGOR with HD  MOnitor Hb  Follow up GI    HTN  Bp fluctuating  MOnitor BP    CKD -BMD   PTH and PO4 acceptable  MOnitor serum calcium and PO4 daily   
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   Nephrology consulted for ESRD management  Pt goes to [facility]     ESRD on HD ( MWF ) via tunnelled HD catehter  Outpt unit [facility]  continue HD MWF   Last HD 2/11 with 2.5 L UF  Next HD 2/14 Monday  Consent in chart    Anemia  IGOR with HD  improving, s/p PRBC transfusion 2/6  Follow up GI    HTN  Bp fluctuating  amlodipine to nifedipine 30 mg ordered  Monitor BP    CKD -BMD   PTH and PO4 acceptable  MOnitor serum calcium and PO4 daily   
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   Nephrology consulted for ESRD management  Pt goes to [facility]     ESRD on HD ( MWF ) via tunnelled HD catehter  Outpt unit [facility]  continue HD MWF   Consent in chart    Anemia  pRBC today. monitor volume status   IGOR with HD  MOnitor Hb  GI /  Heme eval    HTN  Bp elevated  Increased amlodipine to 10mg QD     CKD -BMD   PTH and PO4 acceptable  MOnitor serum calcium and PO4 daily   
anemia  gi bleed    plan  monitor cbc and transfuse prn for hgb <7.5  PPI QD  Carafate 1g PO BID   s/p EGD showing non bleeding gastric and duodenal ulcers  F/u path  will need repeat EGD in 8 weeks to reassess   will also need colonoscopy at that time given hx of polyps  dc planning today as per primary   dw pt     Advanced care planning was discussed with patient and family.  Advanced care planning forms were reviewed and discussed.  Risks, benefits and alternatives of gastroenterologic procedures were discussed in detail and all questions were answered.    30 minutes spent.    
anemia  gi bleed    plan  monitor cbc and transfuse prn for hgb <7.5  PPI QD  Carafate 1g PO BID   s/p EGD showing non bleeding gastric and duodenal ulcers  F/u path  will need repeat EGD in 8 weeks to reassess   will also need colonoscopy at that time given hx of polyps  will follow  dw pt     Advanced care planning was discussed with patient and family.  Advanced care planning forms were reviewed and discussed.  Risks, benefits and alternatives of gastroenterologic procedures were discussed in detail and all questions were answered.    30 minutes spent.    
anemia  gi bleed    plan  monitor cbc and transfuse prn for hgb <7.5  PPI qd   Carafate 1g PO BID   Hold A/C if okay with cardiology   Avoid NSAIDs  EGD planned for Wednesday 2/9 pending correction of Na  HD per nephro   will follow  dw pt     Advanced care planning was discussed with patient and family.  Advanced care planning forms were reviewed and discussed.  Risks, benefits and alternatives of gastroenterologic procedures were discussed in detail and all questions were answered.    30 minutes spent.    
anemia  gi bleed    plan  monitor cbc and transfuse prn for hgb <7.5  PPI qd   Carafate 1g PO BID   Hold A/C if okay with cardiology   Avoid NSAIDs  May need EGD once optimized    Advanced care planning was discussed with patient and family.  Advanced care planning forms were reviewed and discussed.  Risks, benefits and alternatives of gastroenterologic procedures were discussed in detail and all questions were answered.    30 minutes spent.    
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   Nephrology consulted for ESRD management  Pt goes to [facility]     ESRD on HD ( MWF ) via tunnelled HD catehter  Outpt unit [facility]  continue HD [facility]   PT seen and examined on dialysis , tolerating well. BP stable, Access working well  Consent in chart    Anemia  IOGR with HD  MOnitor Hb  Follow up GI    HTN  Bp fluctuating  MOnitor BP    CKD -BMD   PTH and PO4 acceptable  MOnitor serum calcium and PO4 daily   
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 Pt states he has been having very dark stools for roughly 1 week. Went in for labwork today and was told he has very low hgb. Pt states he usually gets EPO at dialysis. Sent in from nephrology office for the recorded. Anemia reportedly at office number was around 6.7. Pt endorses COLE and generalized weakness but denies chest pain, dizziness, syncope or palpitations. Denies any fevers chills. Slight cough only after arrival to ER. Still makes some urine.     In ER: Given keppra 250mg, PPI 80 IV (03 Feb 2022 21:55)    Hematology/Oncology consulted for patient with renal failure S/P renal transplant 2015 found to have marked anemia as well as GI bleeding (melena). Patient transfused PRBC's and given iron since admission. Gets exogenous erythropoietin with HD (3X weekly).    Anemia  --Workup fails to show  B12, folate deficiencies or hemolysis  --Transferrin was low and given recent GI bleed, was given IV iron  --Myeloma workup pending  --Most likely secondary to anemia of chronic renal disease  --Erythropoietin 95987 units being given with HD  --Patient may benefit from increasing dose of Erythropoietin  --EGD positive for non-bleeding ulcers  --Will need repeat EGD and colonoscopy in abuot 8 weeks  --GI management and recommendations appreciated  --Please transfuse PRBC's if Hgb <7.0 grams    After discharge patient may follow with [facility]    Thank you for the opportunity to participate in Mr. Castelan's care.    Robert Martinez PA-C  Hematology/Oncology  [facility]   [phone] (office)  [phone] (alt office)  Evenings and weekends please call MD on call or office  
anemia  gi bleed    plan  monitor cbc and transfuse prn for hgb <7.5  PPI qd   Carafate 1g PO BID   Hold A/C if okay with cardiology   Avoid NSAIDs  EGD planned for Wednesday 2/9    Advanced care planning was discussed with patient and family.  Advanced care planning forms were reviewed and discussed.  Risks, benefits and alternatives of gastroenterologic procedures were discussed in detail and all questions were answered.    30 minutes spent.    
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   Nephrology consulted for ESRD management  Pt goes to [facility]     ESRD on HD ( MWF ) via tunnelled HD catehter  Outpt unit [place] [facility]  continue HD MWF   Consent in chart    Anemia  IGOR with HD  MOnitor Hb  Follow up GI    HTN  Bp fluctuating  MOnitor BP    CKD -BMD   PTH and PO4 acceptable  MOnitor serum calcium and PO4 daily   
anemia  gi bleed    plan  monitor cbc and transfuse prn for hgb <7.5  PPI QD  Carafate 1g PO BID   s/p EGD showing non bleeding gastric and duodenal ulcers  F/u path  will need repeat EGD in 8 weeks to reassess   will also need colonoscopy at that time given hx of polyps  will follow  dw pt     Advanced care planning was discussed with patient and family.  Advanced care planning forms were reviewed and discussed.  Risks, benefits and alternatives of gastroenterologic procedures were discussed in detail and all questions were answered.    30 minutes spent.    
anemia  gi bleed    plan  monitor cbc and transfuse prn for hgb <7.5  PPI qd   Carafate 1g PO BID   Hold A/C if okay with cardiology   Avoid NSAIDs  May need EGD once optimized    Advanced care planning was discussed with patient and family.  Advanced care planning forms were reviewed and discussed.  Risks, benefits and alternatives of gastroenterologic procedures were discussed in detail and all questions were answered.    30 minutes spent.    
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   Nephrology consulted for ESRD management  Pt goes to [facility]     ESRD on HD ( MWF ) via tunnelled HD catehter  Outpt unit [place] [facility]  continue HD [facility]   Plan for HD today for UF , as pt hyponatremia  Check Serum Na 4 hours post completion of dialysis   Consent in chart    Anemia  IGOR with HD  MOnitor Hb  Follow up GI    HTN  Bp fluctuating  MOnitor BP    CKD -BMD   PTH and PO4 acceptable  MOnitor serum calcium and PO4 daily   
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

## 2022-02-12 NOTE — DISCHARGE NOTE PROVIDER - HOSPITAL COURSE
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 These were the reason for acute blood loss anemia.\  Patient tolerated the resumption of AC - home Eliquis resumed  CBC is stable  Hold Aspirin  Started PPI QD and Carafate 1g PO BID   Patient to follow up with GI Dr. Witt for follow up path and will need repeat EGD in 8 weeks to reassess and a colonoscopy at that time given hx of polyps and GI to clear restarting Aspirin     Problem/Plan - 2:  ·  Problem: Paroxysmal atrial fibrillation.   ·  Plan: Restarted pt on Eliquis and pt tolerated  -Cont. Coreg with hold parameters.     Problem/Plan - 3:  ·  Problem: ESRD on dialysis.   ·  Plan: On HD TTS  -Nephrology FU noted.     Problem/Plan - 4:  ·  Problem: Diabetes mellitus.   ·  Plan: On sliding scale at home  Continue Fingersticks and sliding scale.     Problem/Plan - 6:  ·  Problem: Cerebrovascular accident (CVA).   ·  Plan: Hx of seizure prophylaxis s/p CVA  -Cont. keppra 250mg BID     Problem/Plan - 7:  ·  Problem: CAD (coronary artery disease).   ·  Plan: S/p CABG 2020  -Cont. statin  -Holding asa in setting of possible GIB  -Cont. BB.     Problem/Plan - 8:  ·  Problem: Essential hypertension.   ·  Plan: -Cont. amlodipine with hold parameters  -Increase Coreg 12.5 mg with hold parameters.    Pt's H/H is stable and Dr. Flores cleared pt for discharge to home with outpatient follow up with GI

## 2022-02-12 NOTE — PROGRESS NOTE ADULT - SUBJECTIVE AND OBJECTIVE BOX
[facility]   MD Matheus Gary PA    From 7 AM - 5 PM:  OFFICE: [phone]  Dr. Barrientos cell: [phone]  Dr. Rey cell: [phone]  WALDO Valentin cell: [phone]    From 5 PM - 7 AM: Answering Service: [phone]  Date of service: 02-06-22 @ 13:02    RENAL FOLLOW UP NOTE  --------------------------------------------------------------------------------  HPI:  Pt seen and examined at bedside.       PAST HISTORY  --------------------------------------------------------------------------------  No significant changes to PMH, PSH, FHx, SHx, unless otherwise noted    ALLERGIES & MEDICATIONS  --------------------------------------------------------------------------------  Allergies    No Known Allergies    Intolerances      Standing Inpatient Medications  amLODIPine   Tablet 10 milliGRAM(s) Oral daily  atorvastatin 80 milliGRAM(s) Oral at bedtime  carvedilol 12.5 milliGRAM(s) Oral every 12 hours  chlorhexidine 2% Cloths 1 Application(s) Topical daily  dextrose 40% Gel 15 Gram(s) Oral once  dextrose 5%. 1000 milliLiter(s) IV Continuous <Continuous>  dextrose 5%. 1000 milliLiter(s) IV Continuous <Continuous>  dextrose 50% Injectable 25 Gram(s) IV Push once  dextrose 50% Injectable 12.5 Gram(s) IV Push once  dextrose 50% Injectable 25 Gram(s) IV Push once  epoetin cortney-epbx (RETACRIT) Injectable 47017 Unit(s) IV Push <User Schedule>  glucagon  Injectable 1 milliGRAM(s) IntraMuscular once  insulin lispro (ADMELOG) corrective regimen sliding scale   SubCutaneous three times a day before meals  insulin lispro (ADMELOG) corrective regimen sliding scale   SubCutaneous at bedtime  levETIRAcetam 250 milliGRAM(s) Oral two times a day  levothyroxine 50 MICROGram(s) Oral daily  pantoprazole  Injectable 40 milliGRAM(s) IV Push two times a day  sucralfate suspension 1 Gram(s) Oral every 12 hours    PRN Inpatient Medications  acetaminophen     Tablet .. 650 milliGRAM(s) Oral every 6 hours PRN  melatonin 3 milliGRAM(s) Oral at bedtime PRN  ondansetron Injectable 4 milliGRAM(s) IV Push every 8 hours PRN      REVIEW OF SYSTEMS  --------------------------------------------------------------------------------  General: no fever  CVS: no chest pain  RESP: no sob, no cough  ABD: no abdominal pain  : no dysuria  MSK: no edema     VITALS/PHYSICAL EXAM  --------------------------------------------------------------------------------  T(C): 36.6 (02-06-22 @ 13:00), Max: 36.8 (02-05-22 @ 21:00)  HR: 78 (02-06-22 @ 13:00) (62 - 78)  BP: 178/78 (02-06-22 @ 13:00) (132/69 - 178/78)  RR: 18 (02-06-22 @ 13:00) (18 - 18)  SpO2: 96% (02-06-22 @ 13:00) (96% - 100%)  Wt(kg): --        02-05-22 @ 07:01  -  02-06-22 @ 07:00  --------------------------------------------------------  IN: 720 mL / OUT: 640 mL / NET: 80 mL      Physical Exam:  	Gen: NAD  	HEENT: MMM  	Pulm: CTA B/L  	CV: S1S2  	Abd: Soft, +BS  	Ext: No LE edema B/L                      Neuro: Awake   	Skin: Warm and Dry   	Vascular access: hd cath    LABS/STUDIES  --------------------------------------------------------------------------------              6.4    5.82  >-----------<  214      [02-06-22 @ 08:58]              20.4           Phos  4.0     [02-05-22 @ 06:51]    TPro  7.0  /  Alb  x   /  TBili  x   /  DBili  x   /  AST  x   /  ALT  x   /  AlkPhos  x   [02-04-22 @ 22:50]          Creatinine Trend:  SCr 6.29 [02-04 @ 06:27]  SCr 5.44 [02-03 @ 18:58]        Iron 55, TIBC 191, %sat 29      [02-04-22 @ 00:04]  Ferritin 1287      [02-04-22 @ 00:04]  PTH -- (Ca 9.2)      [02-05-22 @ 10:13]   294  HbA1c 6.0      [02-21-20 @ 08:53]  Lipid: chol 118, TG 54, HDL 59, LDL --      [06-27-21 @ 09:44]      
INTERVAL HPI/OVERNIGHT EVENTS:  No new overnight event.  No N/V/D.  Tolerating diet.  no bleeding    Allergies    No Known Allergies    Intolerances    General:  No wt loss, fevers, chills, night sweats, fatigue,   Eyes:  Good vision, no reported pain  ENT:  No sore throat, pain, runny nose, dysphagia  CV:  No pain, palpitations, hypo/hypertension  Resp:  No dyspnea, cough, tachypnea, wheezing  GI:  No pain, No nausea, No vomiting, No diarrhea, No constipation, No weight loss, No fever, No pruritis, No rectal bleeding, No tarry stools, No dysphagia,  :  No pain, bleeding, incontinence, nocturia  Muscle:  No pain, weakness  Neuro:  No weakness, tingling, memory problems  Psych:  No fatigue, insomnia, mood problems, depression  Endocrine:  No polyuria, polydipsia, cold/heat intolerance  Heme:  No petechiae, ecchymosis, easy bruisability  Skin:  No rash, tattoos, scars, edema      PHYSICAL EXAM:   Vital Signs:  Vital Signs Last 24 Hrs  T(C): 37.1 (2022 13:00), Max: 37.1 (2022 13:00)  T(F): 98.7 (2022 13:00), Max: 98.7 (2022 13:00)  HR: 67 (2022 13:00) (63 - 68)  BP: 162/68 (2022 13:05) (135/62 - 179/87)  BP(mean): --  RR: 18 (2022 13:00) (18 - 18)  SpO2: 96% (2022 13:00) (94% - 100%)  Daily     Daily Weight in k.1 (2022 18:10)I&O's Summary    2022 07:01  -  2022 07:00  --------------------------------------------------------  IN: 0 mL / OUT: 2450 mL / NET: -2450 mL    2022 07:01  -  2022 13:28  --------------------------------------------------------  IN: 480 mL / OUT: 200 mL / NET: 280 mL        GENERAL:  Appears stated age, well-groomed, well-nourished, no distress  HEENT:  NC/AT,  conjunctivae clear and pink, no thyromegaly, nodules, adenopathy, no JVD, sclera -anicteric  CHEST:  Full & symmetric excursion, no increased effort, breath sounds clear  HEART:  Regular rhythm, S1, S2, no murmur/rub/S3/S4, no abdominal bruit, no edema  ABDOMEN:  Soft, non-tender, non-distended, normoactive bowel sounds,  no masses ,no hepato-splenomegaly, no signs of chronic liver disease  EXTEREMITIES:  no cyanosis,clubbing or edema  SKIN:  No rash/erythema/ecchymoses/petechiae/wounds/abscess/warm/dry  NEURO:  Alert, oriented, no asterixis, no tremor, no encephalopathy      LABS:                        7.1    8.13  )-----------( 224      ( 2022 06:52 )             22.8     02-04    136  |  98  |  58<H>  ----------------------------<  148<H>  4.8   |  21<L>  |  6.29<H>    Ca    9.2      2022 06:27  Phos  4.0     02-05  Mg     2.3     02-04    TPro  7.0  /  Alb  x   /  TBili  x   /  DBili  x   /  AST  x   /  ALT  x   /  AlkPhos  x   02-04    PT/INR - ( 2022 18:58 )   PT: 16.7 sec;   INR: 1.41 ratio         PTT - ( 2022 18:58 )  PTT:38.3 sec    amylase   lipase  RADIOLOGY & ADDITIONAL TESTS:  
INTERVAL HPI/OVERNIGHT EVENTS:  No new overnight event.  No N/V/D.  Tolerating diet.  still with dark stools    Allergies    No Known Allergies    Intolerances    General:  No wt loss, fevers, chills, night sweats, fatigue,   Eyes:  Good vision, no reported pain  ENT:  No sore throat, pain, runny nose, dysphagia  CV:  No pain, palpitations, hypo/hypertension  Resp:  No dyspnea, cough, tachypnea, wheezing  GI:  No pain, No nausea, No vomiting, No diarrhea, No constipation, No weight loss, No fever, No pruritis, No rectal bleeding, No tarry stools, No dysphagia,  :  No pain, bleeding, incontinence, nocturia  Muscle:  No pain, weakness  Neuro:  No weakness, tingling, memory problems  Psych:  No fatigue, insomnia, mood problems, depression  Endocrine:  No polyuria, polydipsia, cold/heat intolerance  Heme:  No petechiae, ecchymosis, easy bruisability  Skin:  No rash, tattoos, scars, edema      PHYSICAL EXAM:   Vital Signs:  Vital Signs Last 24 Hrs  T(C): 36.6 (06 Feb 2022 09:00), Max: 37.1 (05 Feb 2022 13:00)  T(F): 97.9 (06 Feb 2022 09:00), Max: 98.7 (05 Feb 2022 13:00)  HR: 66 (06 Feb 2022 09:00) (62 - 67)  BP: 157/67 (06 Feb 2022 09:00) (132/69 - 177/71)  BP(mean): 93 (06 Feb 2022 05:15) (93 - 107)  RR: 18 (06 Feb 2022 09:00) (18 - 18)  SpO2: 96% (06 Feb 2022 09:00) (96% - 100%)  Daily     Daily I&O's Summary    05 Feb 2022 07:01  -  06 Feb 2022 07:00  --------------------------------------------------------  IN: 720 mL / OUT: 640 mL / NET: 80 mL        GENERAL:  Appears stated age, well-groomed, well-nourished, no distress  HEENT:  NC/AT,  conjunctivae clear and pink, no thyromegaly, nodules, adenopathy, no JVD, sclera -anicteric  CHEST:  Full & symmetric excursion, no increased effort, breath sounds clear  HEART:  Regular rhythm, S1, S2, no murmur/rub/S3/S4, no abdominal bruit, no edema  ABDOMEN:  Soft, non-tender, non-distended, normoactive bowel sounds,  no masses ,no hepato-splenomegaly, no signs of chronic liver disease  EXTEREMITIES:  no cyanosis,clubbing or edema  SKIN:  No rash/erythema/ecchymoses/petechiae/wounds/abscess/warm/dry  NEURO:  Alert, oriented, no asterixis, no tremor, no encephalopathy      LABS:                        6.4    5.82  )-----------( 214      ( 06 Feb 2022 08:58 )             20.4       Phos  4.0     02-05    TPro  7.0  /  Alb  x   /  TBili  x   /  DBili  x   /  AST  x   /  ALT  x   /  AlkPhos  x   02-04        amylase   lipase  RADIOLOGY & ADDITIONAL TESTS:  
INTERVAL HPI/OVERNIGHT EVENTS:  pt seen and examined, no new events  H/H stable    Allergies    No Known Allergies    Intolerances    General:  No wt loss, fevers, chills, night sweats, fatigue,   Eyes:  Good vision, no reported pain  ENT:  No sore throat, pain, runny nose, dysphagia  CV:  No pain, palpitations, hypo/hypertension  Resp:  No dyspnea, cough, tachypnea, wheezing  GI:  No pain, No nausea, No vomiting, No diarrhea, No constipation, No weight loss, No fever, No pruritis, No rectal bleeding, No tarry stools, No dysphagia,  :  No pain, bleeding, incontinence, nocturia  Muscle:  No pain, weakness  Neuro:  No weakness, tingling, memory problems  Psych:  No fatigue, insomnia, mood problems, depression  Endocrine:  No polyuria, polydipsia, cold/heat intolerance  Heme:  No petechiae, ecchymosis, easy bruisability  Skin:  No rash, tattoos, scars, edema      PHYSICAL EXAM:   Vital Signs Last 24 Hrs  T(C): 36.4 (12 Feb 2022 05:59), Max: 37.3 (11 Feb 2022 21:26)  T(F): 97.6 (12 Feb 2022 05:59), Max: 99.2 (11 Feb 2022 21:26)  HR: 59 (12 Feb 2022 05:59) (59 - 67)  BP: 161/69 (12 Feb 2022 05:59) (151/65 - 179/79)  BP(mean): --  RR: 18 (12 Feb 2022 05:59) (18 - 18)  SpO2: 98% (12 Feb 2022 05:59) (98% - 100%)  Daily     Daily   I&O's Summary      GENERAL:  Appears stated age, well-groomed, well-nourished, no distress  HEENT:  NC/AT,  conjunctivae clear and pink, no thyromegaly, nodules, adenopathy, no JVD, sclera -anicteric  CHEST:  Full & symmetric excursion, no increased effort, breath sounds clear  HEART:  Regular rhythm, S1, S2, no murmur/rub/S3/S4, no abdominal bruit, no edema  ABDOMEN:  Soft, non-tender, non-distended, normoactive bowel sounds,  no masses ,no hepato-splenomegaly, no signs of chronic liver disease  EXTEREMITIES:  no cyanosis,clubbing or edema  SKIN:  No rash/erythema/ecchymoses/petechiae/wounds/abscess/warm/dry  NEURO:  Alert, oriented, no asterixis, no tremor, no encephalopathy      LABS:                        8.5    7.13  )-----------( 211      ( 12 Feb 2022 06:46 )             27.2   02-12    132<L>  |  95<L>  |  35<H>  ----------------------------<  107<H>  4.4   |  22  |  6.01<H>    Ca    8.9      12 Feb 2022 07:11            amylase   lipase  RADIOLOGY & ADDITIONAL TESTS:  
INTERVAL HPI/OVERNIGHT EVENTS:  pt seen and examined, no new events  H/H stable, no BM yesterday     Allergies    No Known Allergies    Intolerances    General:  No wt loss, fevers, chills, night sweats, fatigue,   Eyes:  Good vision, no reported pain  ENT:  No sore throat, pain, runny nose, dysphagia  CV:  No pain, palpitations, hypo/hypertension  Resp:  No dyspnea, cough, tachypnea, wheezing  GI:  No pain, No nausea, No vomiting, No diarrhea, No constipation, No weight loss, No fever, No pruritis, No rectal bleeding, No tarry stools, No dysphagia,  :  No pain, bleeding, incontinence, nocturia  Muscle:  No pain, weakness  Neuro:  No weakness, tingling, memory problems  Psych:  No fatigue, insomnia, mood problems, depression  Endocrine:  No polyuria, polydipsia, cold/heat intolerance  Heme:  No petechiae, ecchymosis, easy bruisability  Skin:  No rash, tattoos, scars, edema      PHYSICAL EXAM:   Vital Signs Last 24 Hrs  T(C): 36.7 (11 Feb 2022 09:05), Max: 36.8 (10 Feb 2022 21:23)  T(F): 98.1 (11 Feb 2022 09:05), Max: 98.3 (10 Feb 2022 21:23)  HR: 54 (11 Feb 2022 09:05) (54 - 58)  BP: 160/74 (11 Feb 2022 09:05) (142/72 - 160/74)  BP(mean): --  RR: 17 (11 Feb 2022 09:05) (17 - 18)  SpO2: 99% (11 Feb 2022 09:05) (94% - 100%)  Daily     Daily   I&O's Summary      GENERAL:  Appears stated age, well-groomed, well-nourished, no distress  HEENT:  NC/AT,  conjunctivae clear and pink, no thyromegaly, nodules, adenopathy, no JVD, sclera -anicteric  CHEST:  Full & symmetric excursion, no increased effort, breath sounds clear  HEART:  Regular rhythm, S1, S2, no murmur/rub/S3/S4, no abdominal bruit, no edema  ABDOMEN:  Soft, non-tender, non-distended, normoactive bowel sounds,  no masses ,no hepato-splenomegaly, no signs of chronic liver disease  EXTEREMITIES:  no cyanosis,clubbing or edema  SKIN:  No rash/erythema/ecchymoses/petechiae/wounds/abscess/warm/dry  NEURO:  Alert, oriented, no asterixis, no tremor, no encephalopathy      LABS:                        8.2    7.53  )-----------( 202      ( 11 Feb 2022 09:20 )             25.3   02-11    130<L>  |  92<L>  |  36<H>  ----------------------------<  139<H>  4.3   |  22  |  6.44<H>    Ca    8.6      11 Feb 2022 09:20  Mg     1.9     02-10        amylase   lipase  RADIOLOGY & ADDITIONAL TESTS:  
Patient is a 66y old  Male who presents with a chief complaint of Melena and anemia (2022 10:23)    2022  HPI:  Events noted. Had an episode of seizure.    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    Anemia    PEG (percutaneous endoscopic gastrostomy) status  removed 2020    Intubation of airway performed without difficulty  Dec 2019  CVA c/b status epilepticus requiring intubation and PEG in 2019-    ESRD on dialysis  M/W/F    History of insertion of stent into coronary artery bypass graft   and 2019    S/P CABG x 3  off pump C3L on 20        Review of Systems:   CONSTITUTIONAL: No fever, weight loss, or fatigue  EYES: No eye pain, visual disturbances, or discharge  ENMT:  No difficulty hearing, tinnitus, vertigo; No sinus or throat pain  NECK: No pain or stiffness  BREASTS: No pain, masses, or nipple discharge  RESPIRATORY: No cough, wheezing, chills or hemoptysis; No shortness of breath  CARDIOVASCULAR: No chest pain, palpitations, dizziness, or leg swelling  GASTROINTESTINAL: No abdominal or epigastric pain. No nausea, vomiting, or hematemesis; No diarrhea or constipation. No melena or hematochezia.  GENITOURINARY: No dysuria, frequency, hematuria, or incontinence  NEUROLOGICAL: No headaches, memory loss, loss of strength, numbness, or tremors  SKIN: No itching, burning, rashes, or lesions   LYMPH NODES: No enlarged glands  ENDOCRINE: No heat or cold intolerance; No hair loss  MUSCULOSKELETAL: No joint pain or swelling; No muscle, back, or extremity pain  PSYCHIATRIC: No depression, anxiety, mood swings, or difficulty sleeping  HEME/LYMPH: No easy bruising, or bleeding gums  ALLERY AND IMMUNOLOGIC: No hives or eczema    Allergies    No Known Allergies    Intolerances        Social History:     FAMILY HISTORY:  Family history of cancer of tongue (Father)  Parents are  . Father - at 80 years, tongue cancer was a smoker. Mother- at 71, had accident while crossing road. Siblings- 2 brothers and one sister.        MEDICATIONS  (STANDING):  amLODIPine   Tablet 5 milliGRAM(s) Oral daily  atorvastatin 80 milliGRAM(s) Oral at bedtime  carvedilol 6.25 milliGRAM(s) Oral every 12 hours  dextrose 40% Gel 15 Gram(s) Oral once  dextrose 5%. 1000 milliLiter(s) (50 mL/Hr) IV Continuous <Continuous>  dextrose 5%. 1000 milliLiter(s) (100 mL/Hr) IV Continuous <Continuous>  dextrose 50% Injectable 25 Gram(s) IV Push once  dextrose 50% Injectable 12.5 Gram(s) IV Push once  dextrose 50% Injectable 25 Gram(s) IV Push once  epoetin cortney-epbx (RETACRIT) Injectable 66868 Unit(s) IV Push <User Schedule>  glucagon  Injectable 1 milliGRAM(s) IntraMuscular once  insulin lispro (ADMELOG) corrective regimen sliding scale   SubCutaneous three times a day before meals  insulin lispro (ADMELOG) corrective regimen sliding scale   SubCutaneous at bedtime  levETIRAcetam 250 milliGRAM(s) Oral two times a day  levothyroxine 50 MICROGram(s) Oral daily  pantoprazole  Injectable 40 milliGRAM(s) IV Push two times a day    MEDICATIONS  (PRN):  acetaminophen     Tablet .. 650 milliGRAM(s) Oral every 6 hours PRN Temp greater or equal to 38C (100.4F), Mild Pain (1 - 3)  melatonin 3 milliGRAM(s) Oral at bedtime PRN Insomnia  ondansetron Injectable 4 milliGRAM(s) IV Push every 8 hours PRN Nausea and/or Vomiting        CAPILLARY BLOOD GLUCOSE      POCT Blood Glucose.: 110 mg/dL (2022 19:26)  POCT Blood Glucose.: 159 mg/dL (2022 07:56)  POCT Blood Glucose.: 259 mg/dL (2022 20:13)    I&O's Summary    2022 07:01  -  2022 20:09  --------------------------------------------------------  IN: 0 mL / OUT: 2000 mL / NET: -2000 mL        PHYSICAL EXAM:  Vital Signs Last 24 Hrs  T(C): 36.8 (2022 18:10), Max: 36.9 (2022 20:53)  T(F): 98.2 (2022 18:10), Max: 98.4 (2022 20:53)  HR: 63 (2022 18:10) (63 - 89)  BP: 167/78 (2022 18:10) (156/75 - 181/74)  BP(mean): --  RR: 18 (2022 18:10) (17 - 18)  SpO2: 95% (2022 18:10) (94% - 98%)    GENERAL: NAD, well-developed  HEAD:  Atraumatic, Normocephalic  EYES: EOMI, PERRLA, conjunctiva and sclera clear  NECK: Supple, No JVD  CHEST/LUNG: Clear to auscultation bilaterally; No wheeze  HEART: Regular rate and rhythm; No murmurs, rubs, or gallops  ABDOMEN: Soft, Nontender, Nondistended; Bowel sounds present  EXTREMITIES:  2+ Peripheral Pulses, No clubbing, cyanosis, or edema  PSYCH: AAOx3  NEUROLOGY: non-focal  SKIN: No rashes or lesions    LABS:                        7.1    8.71  )-----------( 239      ( 2022 06:27 )             23.7     02-04    136  |  98  |  58<H>  ----------------------------<  148<H>  4.8   |  21<L>  |  6.29<H>    Ca    9.2      2022 06:27  Phos  4.6     02-04  Mg     2.3     02-04    TPro  7.6  /  Alb  4.1  /  TBili  0.3  /  DBili  x   /  AST  11  /  ALT  13  /  AlkPhos  161<H>  02-04    PT/INR - ( 2022 18:58 )   PT: 16.7 sec;   INR: 1.41 ratio         PTT - ( 2022 18:58 )  PTT:38.3 sec          RADIOLOGY & ADDITIONAL TESTS:    Imaging Personally Reviewed:    Consultant(s) Notes Reviewed:      Care Discussed with Consultants/Other Providers:  
INTERVAL HPI/OVERNIGHT EVENTS:  No new overnight event.  No N/V/D.  Tolerating diet.  EGD cancelled 2/2 abnormal labs, pt hyponatremic   rescheduled for Wednesday 2/9    Allergies    No Known Allergies    Intolerances    General:  No wt loss, fevers, chills, night sweats, fatigue,   Eyes:  Good vision, no reported pain  ENT:  No sore throat, pain, runny nose, dysphagia  CV:  No pain, palpitations, hypo/hypertension  Resp:  No dyspnea, cough, tachypnea, wheezing  GI:  No pain, No nausea, No vomiting, No diarrhea, No constipation, No weight loss, No fever, No pruritis, No rectal bleeding, No tarry stools, No dysphagia,  :  No pain, bleeding, incontinence, nocturia  Muscle:  No pain, weakness  Neuro:  No weakness, tingling, memory problems  Psych:  No fatigue, insomnia, mood problems, depression  Endocrine:  No polyuria, polydipsia, cold/heat intolerance  Heme:  No petechiae, ecchymosis, easy bruisability  Skin:  No rash, tattoos, scars, edema      PHYSICAL EXAM:   Vital Signs Last 24 Hrs  T(C): 36.4 (07 Feb 2022 12:54), Max: 36.8 (06 Feb 2022 17:47)  T(F): 97.5 (07 Feb 2022 12:54), Max: 98.2 (06 Feb 2022 17:47)  HR: 63 (07 Feb 2022 12:54) (55 - 63)  BP: 160/74 (07 Feb 2022 12:54) (124/63 - 170/72)  BP(mean): 107 (07 Feb 2022 12:54) (88 - 107)  RR: 18 (07 Feb 2022 12:54) (18 - 18)  SpO2: 98% (07 Feb 2022 12:54) (98% - 100%)  Daily     Daily   I&O's Summary      GENERAL:  Appears stated age, well-groomed, well-nourished, no distress  HEENT:  NC/AT,  conjunctivae clear and pink, no thyromegaly, nodules, adenopathy, no JVD, sclera -anicteric  CHEST:  Full & symmetric excursion, no increased effort, breath sounds clear  HEART:  Regular rhythm, S1, S2, no murmur/rub/S3/S4, no abdominal bruit, no edema  ABDOMEN:  Soft, non-tender, non-distended, normoactive bowel sounds,  no masses ,no hepato-splenomegaly, no signs of chronic liver disease  EXTEREMITIES:  no cyanosis,clubbing or edema  SKIN:  No rash/erythema/ecchymoses/petechiae/wounds/abscess/warm/dry  NEURO:  Alert, oriented, no asterixis, no tremor, no encephalopathy      LABS:                        7.6    6.07  )-----------( 208      ( 07 Feb 2022 06:36 )             23.7   02-07    124<L>  |  88<L>  |  53<H>  ----------------------------<  132<H>  5.0   |  20<L>  |  8.97<H>    Ca    8.5      07 Feb 2022 06:35        amylase   lipase  RADIOLOGY & ADDITIONAL TESTS:  
[facility] NEPHROLOGY   Dr Iliana Rey      OFFICE: [phone]  Dr Barrientos cell: [phone]   Dr Rey cell: [phone]    Answering Service ( 5pm - 7 am) [phone]     Chief Complaint: ESRD/Ongoing hemodialysis requirement  date of service 02-11-22 @ 10:35    24 hour events/subjective:  PT seen and examined on dialysis , tolerating well. BP stable, Access working well      PAST HISTORY  --------------------------------------------------------------------------------  No significant changes to PMH, PSH, FHx, SHx, unless otherwise noted    ALLERGIES & MEDICATIONS  --------------------------------------------------------------------------------  Allergies    No Known Allergies    Intolerances      Standing Inpatient Medications  amLODIPine   Tablet 10 milliGRAM(s) Oral daily  atorvastatin 80 milliGRAM(s) Oral at bedtime  carvedilol 12.5 milliGRAM(s) Oral every 12 hours  chlorhexidine 2% Cloths 1 Application(s) Topical daily  dextrose 40% Gel 15 Gram(s) Oral once  dextrose 5%. 1000 milliLiter(s) IV Continuous <Continuous>  dextrose 5%. 1000 milliLiter(s) IV Continuous <Continuous>  dextrose 50% Injectable 25 Gram(s) IV Push once  dextrose 50% Injectable 12.5 Gram(s) IV Push once  dextrose 50% Injectable 25 Gram(s) IV Push once  epoetin cortney-epbx (RETACRIT) Injectable 83270 Unit(s) IV Push <User Schedule>  glucagon  Injectable 1 milliGRAM(s) IntraMuscular once  heparin  Infusion 1050 Unit(s)/Hr IV Continuous <Continuous>  insulin lispro (ADMELOG) corrective regimen sliding scale   SubCutaneous three times a day before meals  insulin lispro (ADMELOG) corrective regimen sliding scale   SubCutaneous at bedtime  levETIRAcetam 125 milliGRAM(s) Oral <User Schedule>  levETIRAcetam 250 milliGRAM(s) Oral two times a day  levothyroxine 50 MICROGram(s) Oral daily  montelukast 10 milliGRAM(s) Oral daily  pantoprazole    Tablet 40 milliGRAM(s) Oral before breakfast  polyethylene glycol 3350 17 Gram(s) Oral daily  sodium chloride 0.9%. 500 milliLiter(s) IV Continuous <Continuous>  sucralfate suspension 1 Gram(s) Oral every 12 hours    PRN Inpatient Medications  acetaminophen     Tablet .. 650 milliGRAM(s) Oral every 6 hours PRN  melatonin 3 milliGRAM(s) Oral at bedtime PRN  ondansetron Injectable 4 milliGRAM(s) IV Push every 8 hours PRN      REVIEW OF SYSTEMS  --------------------------------------------------------------------------------  As above.    VITALS/PHYSICAL EXAM  --------------------------------------------------------------------------------  T(C): 36.7 (02-11-22 @ 09:05), Max: 36.8 (02-10-22 @ 21:23)  HR: 54 (02-11-22 @ 09:05) (54 - 62)  BP: 160/74 (02-11-22 @ 09:05) (139/68 - 160/74)  RR: 17 (02-11-22 @ 09:05) (17 - 18)  SpO2: 99% (02-11-22 @ 09:05) (94% - 100%)  Wt(kg): --  Height (cm): 175.3 (02-09-22 @ 10:54)  Weight (kg): 63.1 (02-09-22 @ 10:54)  BMI (kg/m2): 20.5 (02-09-22 @ 10:54)  BSA (m2): 1.77 (02-09-22 @ 10:54)      02-10-22 @ 07:01  -  02-11-22 @ 07:00  --------------------------------------------------------  IN: 460 mL / OUT: 0 mL / NET: 460 mL      Physical Exam:  	Gen: NAD  	HEENT: ELENA  	Pulm: CTA B/L  	CV: S1S2  	Abd: Soft  	Ext: No LE edema B/L  	Neuro: Awake  	Skin: Warm and Dry   	Vascular access: HD catheter            no  garrett  LABS/STUDIES  --------------------------------------------------------------------------------              8.2    7.53  >-----------<  202      [02-11-22 @ 09:20]              25.3     130  |  92  |  36  ----------------------------<  139      [02-11-22 @ 09:20]  4.3   |  22  |  6.44        Ca     8.6     [02-11-22 @ 09:20]      Mg     1.9     [02-10-22 @ 07:07]        PTT: 85.2       [02-11-22 @ 09:20]      Iron 55, TIBC 191, %sat 29      [02-04-22 @ 00:04]  Ferritin 1287      [02-04-22 @ 00:04]  PTH -- (Ca 9.2)      [02-05-22 @ 10:13]   294  HbA1c 6.0      [02-21-20 @ 08:53]  Lipid: chol 118, TG 54, HDL 59, LDL --      [06-27-21 @ 09:44]        
[facility] [place] [facility]   MD NINA MASON MD RUORU WONG, PA    TEL:  OFFICE: [phone]  DR HSU CELL: [phone]  RAS MORELOS CELL: [phone]  DR. MARQUEZ CELL: [phone]      FROM 5 PM - 7 AM PLEASE CALL ANSWERING SERVICE: [phone]    RENAL FOLLOW UP NOTE--Date of Service 02-08-22 @ 09:02  --------------------------------------------------------------------------------  HPI:    Pt seen and examined at bedside.     PAST HISTORY  --------------------------------------------------------------------------------  No significant changes to PMH, PSH, FHx, SHx, unless otherwise noted    ALLERGIES & MEDICATIONS  --------------------------------------------------------------------------------  Allergies    No Known Allergies    Intolerances      Standing Inpatient Medications  amLODIPine   Tablet 10 milliGRAM(s) Oral daily  atorvastatin 80 milliGRAM(s) Oral at bedtime  carvedilol 12.5 milliGRAM(s) Oral every 12 hours  chlorhexidine 2% Cloths 1 Application(s) Topical daily  dextrose 40% Gel 15 Gram(s) Oral once  dextrose 5%. 1000 milliLiter(s) IV Continuous <Continuous>  dextrose 5%. 1000 milliLiter(s) IV Continuous <Continuous>  dextrose 50% Injectable 25 Gram(s) IV Push once  dextrose 50% Injectable 12.5 Gram(s) IV Push once  dextrose 50% Injectable 25 Gram(s) IV Push once  epoetin cortney-epbx (RETACRIT) Injectable 10422 Unit(s) IV Push <User Schedule>  glucagon  Injectable 1 milliGRAM(s) IntraMuscular once  insulin lispro (ADMELOG) corrective regimen sliding scale   SubCutaneous three times a day before meals  insulin lispro (ADMELOG) corrective regimen sliding scale   SubCutaneous at bedtime  levETIRAcetam 125 milliGRAM(s) Oral <User Schedule>  levETIRAcetam 250 milliGRAM(s) Oral two times a day  levothyroxine 50 MICROGram(s) Oral daily  montelukast 10 milliGRAM(s) Oral daily  pantoprazole  Injectable 40 milliGRAM(s) IV Push two times a day  sucralfate suspension 1 Gram(s) Oral every 12 hours    PRN Inpatient Medications  acetaminophen     Tablet .. 650 milliGRAM(s) Oral every 6 hours PRN  melatonin 3 milliGRAM(s) Oral at bedtime PRN  ondansetron Injectable 4 milliGRAM(s) IV Push every 8 hours PRN      REVIEW OF SYSTEMS  --------------------------------------------------------------------------------  General: no fever  MSK: no edema     VITALS/PHYSICAL EXAM  --------------------------------------------------------------------------------  T(C): 36.5 (02-08-22 @ 05:52), Max: 36.5 (02-07-22 @ 11:55)  HR: 58 (02-08-22 @ 05:52) (58 - 63)  BP: 156/71 (02-08-22 @ 05:52) (139/63 - 160/74)  RR: 18 (02-08-22 @ 05:52) (18 - 18)  SpO2: 98% (02-08-22 @ 05:52) (98% - 100%)  Wt(kg): --        02-07-22 @ 07:01  -  02-08-22 @ 07:00  --------------------------------------------------------  IN: 660 mL / OUT: 2000 mL / NET: -1340 mL      Physical Exam:  	Gen: NAD  	HEENT: MMM  	Pulm: CTA B/L  	CV: S1S2  	Abd: Soft, +BS  	Ext: No LE edema B/L                      Neuro: Awake   	Skin: Warm and Dry   	Vascular access: HD catheter            no garrett  LABS/STUDIES  --------------------------------------------------------------------------------              7.2    5.63  >-----------<  189      [02-08-22 @ 07:41]              22.2     126  |  91  |  29  ----------------------------<  114      [02-08-22 @ 07:40]  4.5   |  20  |  6.33        Ca     8.3     [02-08-22 @ 07:40]      Mg     1.9     [02-08-22 @ 07:40]      Phos  4.4     [02-08-22 @ 07:40]            Creatinine Trend:  SCr 6.33 [02-08 @ 07:40]  SCr 8.97 [02-07 @ 06:35]  SCr 6.29 [02-04 @ 06:27]  SCr 5.44 [02-03 @ 18:58]        Iron 55, TIBC 191, %sat 29      [02-04-22 @ 00:04]  Ferritin 1287      [02-04-22 @ 00:04]  PTH -- (Ca 9.2)      [02-05-22 @ 10:13]   294  HbA1c 6.0      [02-21-20 @ 08:53]  Lipid: chol 118, TG 54, HDL 59, LDL --      [06-27-21 @ 09:44]      Free Light Chains: kappa 22.34, lambda 13.02, ratio = 1.72      [02-07 @ 09:29]  
[facility] [place] [facility]   MD NINA MASON MD RUORU WONG, PA    TEL:  OFFICE: [phone]  DR HSU CELL: [phone]  RAS MORELOS CELL: [phone]  DR. MARQUEZ CELL: [phone]      FROM 5 PM - 7 AM PLEASE CALL ANSWERING SERVICE: [phone]    RENAL FOLLOW UP NOTE--Date of Service 02-07-22 @ 08:15  --------------------------------------------------------------------------------  HPI:      Pt seen and examined at bedside.   Denies SOB, chest pain     PAST HISTORY  --------------------------------------------------------------------------------  No significant changes to PMH, PSH, FHx, SHx, unless otherwise noted    ALLERGIES & MEDICATIONS  --------------------------------------------------------------------------------  Allergies    No Known Allergies    Intolerances      Standing Inpatient Medications  amLODIPine   Tablet 10 milliGRAM(s) Oral daily  atorvastatin 80 milliGRAM(s) Oral at bedtime  carvedilol 12.5 milliGRAM(s) Oral every 12 hours  chlorhexidine 2% Cloths 1 Application(s) Topical daily  dextrose 40% Gel 15 Gram(s) Oral once  dextrose 5%. 1000 milliLiter(s) IV Continuous <Continuous>  dextrose 5%. 1000 milliLiter(s) IV Continuous <Continuous>  dextrose 50% Injectable 25 Gram(s) IV Push once  dextrose 50% Injectable 12.5 Gram(s) IV Push once  dextrose 50% Injectable 25 Gram(s) IV Push once  epoetin cortney-epbx (RETACRIT) Injectable 74827 Unit(s) IV Push <User Schedule>  glucagon  Injectable 1 milliGRAM(s) IntraMuscular once  insulin lispro (ADMELOG) corrective regimen sliding scale   SubCutaneous three times a day before meals  insulin lispro (ADMELOG) corrective regimen sliding scale   SubCutaneous at bedtime  levETIRAcetam 250 milliGRAM(s) Oral two times a day  levothyroxine 50 MICROGram(s) Oral daily  pantoprazole  Injectable 40 milliGRAM(s) IV Push two times a day  sucralfate suspension 1 Gram(s) Oral every 12 hours    PRN Inpatient Medications  acetaminophen     Tablet .. 650 milliGRAM(s) Oral every 6 hours PRN  melatonin 3 milliGRAM(s) Oral at bedtime PRN  ondansetron Injectable 4 milliGRAM(s) IV Push every 8 hours PRN      REVIEW OF SYSTEMS  --------------------------------------------------------------------------------  General: no fever  CVS: no chest pain  RESP: no sob, no cough  ABD: no abdominal pain  : no dysuria,  MSK: no edema     VITALS/PHYSICAL EXAM  --------------------------------------------------------------------------------  T(C): 36.7 (02-07-22 @ 05:30), Max: 36.8 (02-06-22 @ 17:47)  HR: 56 (02-07-22 @ 05:30) (55 - 78)  BP: 147/70 (02-07-22 @ 05:30) (124/63 - 178/78)  RR: 18 (02-07-22 @ 05:30) (18 - 18)  SpO2: 98% (02-07-22 @ 05:30) (96% - 100%)  Wt(kg): --        Physical Exam:  	Gen: NAD  	HEENT: MMM  	Pulm: CTA B/L  	CV: S1S2  	Abd: Soft, +BS  	Ext: No LE edema B/L                      Neuro: Awake   	Skin: Warm and Dry   	Vascular access: O HD catheter            no tucker  LABS/STUDIES  --------------------------------------------------------------------------------              7.6    6.07  >-----------<  208      [02-07-22 @ 06:36]              23.7     124  |  88  |  53  ----------------------------<  132      [02-07-22 @ 06:35]  5.0   |  20  |  8.97        Ca     8.5     [02-07-22 @ 06:35]            Creatinine Trend:  SCr 8.97 [02-07 @ 06:35]  SCr 6.29 [02-04 @ 06:27]  SCr 5.44 [02-03 @ 18:58]        Iron 55, TIBC 191, %sat 29      [02-04-22 @ 00:04]  Ferritin 1287      [02-04-22 @ 00:04]  PTH -- (Ca 9.2)      [02-05-22 @ 10:13]   294  HbA1c 6.0      [02-21-20 @ 08:53]  Lipid: chol 118, TG 54, HDL 59, LDL --      [06-27-21 @ 09:44]      
[facility] [place] [facility]   MD Matheus Gary PA    From 7 AM - 5 PM:  OFFICE: [phone]  Dr. Barrientos cell: [phone]  Dr. Rey cell: [phone]  WALDO Valentin cell: [phone]    From 5 PM - 7 AM: Answering Service: [phone]  Date of service: 02-05-22 @ 13:42    RENAL FOLLOW UP NOTE  --------------------------------------------------------------------------------  HPI:  Pt seen and examined at bedside.   Denies SOB, chest pain     PAST HISTORY  --------------------------------------------------------------------------------  No significant changes to PMH, PSH, FHx, SHx, unless otherwise noted    ALLERGIES & MEDICATIONS  --------------------------------------------------------------------------------  Allergies    No Known Allergies    Intolerances      Standing Inpatient Medications  amLODIPine   Tablet 5 milliGRAM(s) Oral daily  atorvastatin 80 milliGRAM(s) Oral at bedtime  carvedilol 6.25 milliGRAM(s) Oral every 12 hours  dextrose 40% Gel 15 Gram(s) Oral once  dextrose 5%. 1000 milliLiter(s) IV Continuous <Continuous>  dextrose 5%. 1000 milliLiter(s) IV Continuous <Continuous>  dextrose 50% Injectable 25 Gram(s) IV Push once  dextrose 50% Injectable 12.5 Gram(s) IV Push once  dextrose 50% Injectable 25 Gram(s) IV Push once  epoetin cortney-epbx (RETACRIT) Injectable 88305 Unit(s) IV Push <User Schedule>  glucagon  Injectable 1 milliGRAM(s) IntraMuscular once  insulin lispro (ADMELOG) corrective regimen sliding scale   SubCutaneous three times a day before meals  insulin lispro (ADMELOG) corrective regimen sliding scale   SubCutaneous at bedtime  levETIRAcetam 250 milliGRAM(s) Oral two times a day  levothyroxine 50 MICROGram(s) Oral daily  pantoprazole  Injectable 40 milliGRAM(s) IV Push two times a day  sucralfate suspension 1 Gram(s) Oral every 12 hours    PRN Inpatient Medications  acetaminophen     Tablet .. 650 milliGRAM(s) Oral every 6 hours PRN  melatonin 3 milliGRAM(s) Oral at bedtime PRN  ondansetron Injectable 4 milliGRAM(s) IV Push every 8 hours PRN      REVIEW OF SYSTEMS  --------------------------------------------------------------------------------  General: no fever  CVS: no chest pain  RESP: no sob, no cough  ABD: no abdominal pain  : no dysuria  MSK: no edema     VITALS/PHYSICAL EXAM  --------------------------------------------------------------------------------  T(C): 37.1 (02-05-22 @ 13:00), Max: 37.1 (02-05-22 @ 13:00)  HR: 67 (02-05-22 @ 13:00) (63 - 68)  BP: 162/68 (02-05-22 @ 13:05) (135/62 - 179/87)  RR: 18 (02-05-22 @ 13:00) (18 - 18)  SpO2: 96% (02-05-22 @ 13:00) (94% - 100%)  Wt(kg): --  Height (cm): 175.3 (02-03-22 @ 22:19)  Weight (kg): 63.1 (02-04-22 @ 12:09)  BMI (kg/m2): 20.5 (02-04-22 @ 12:09)  BSA (m2): 1.77 (02-04-22 @ 12:09)      02-04-22 @ 07:01  -  02-05-22 @ 07:00  --------------------------------------------------------  IN: 0 mL / OUT: 2450 mL / NET: -2450 mL    02-05-22 @ 07:01  -  02-05-22 @ 13:42  --------------------------------------------------------  IN: 480 mL / OUT: 200 mL / NET: 280 mL      Physical Exam:  	Gen: NAD  	HEENT: MMM  	Pulm: CTA B/L  	CV: S1S2  	Abd: Soft, +BS  	Ext: No LE edema B/L                      Neuro: Awake   	Skin: Warm and Dry   	Vascular access:    LABS/STUDIES  --------------------------------------------------------------------------------              7.1    8.13  >-----------<  224      [02-05-22 @ 06:52]              22.8     136  |  98  |  58  ----------------------------<  148      [02-04-22 @ 06:27]  4.8   |  21  |  6.29        Ca     9.2     [02-04-22 @ 06:27]      Mg     2.3     [02-04-22 @ 06:27]      Phos  4.0     [02-05-22 @ 06:51]    TPro  7.0  /  Alb  x   /  TBili  x   /  DBili  x   /  AST  x   /  ALT  x   /  AlkPhos  x   [02-04-22 @ 22:50]    PT/INR: PT 16.7 , INR 1.41       [02-03-22 @ 18:58]  PTT: 38.3       [02-03-22 @ 18:58]          [02-03-22 @ 18:58]    Creatinine Trend:  SCr 6.29 [02-04 @ 06:27]  SCr 5.44 [02-03 @ 18:58]      Iron 55, TIBC 191, %sat 29      [02-04-22 @ 00:04]  Ferritin 1287      [02-04-22 @ 00:04]  PTH -- (Ca 9.2)      [02-05-22 @ 10:13]   294  HbA1c 6.0      [02-21-20 @ 08:53]  Lipid: chol 118, TG 54, HDL 59, LDL --      [06-27-21 @ 09:44]      
[facility] [place] [facility]   MD NINA MASON PA MIJUNG SHIN NP     TEL:  OFFICE: [phone]  DR HSU CELL: [phone]  DR. MARQUEZ CELL: [phone]  MARILYNN MORELOS CELL: [phone]  NP Lillian Buchanan CELL: [phone]    From 5pm-7am Answering Service [phone]    -- RENAL FOLLOW UP NOTE ---Date of Service 02-12-22 @ 11:33    Patient is a 66y old  Male who presents with a chief complaint of Melena and anemia (12 Feb 2022 10:31)      Patient seen and examined at bedside. No chest pain/sob, no melena.    VITALS:  T(F): 97.6 (02-12-22 @ 05:59), Max: 99.2 (02-11-22 @ 21:26)  HR: 59 (02-12-22 @ 05:59)  BP: 161/69 (02-12-22 @ 05:59)  RR: 18 (02-12-22 @ 05:59)  SpO2: 98% (02-12-22 @ 05:59)  Wt(kg): --    02-11 @ 07:01  -  02-12 @ 07:00  --------------------------------------------------------  IN: 480 mL / OUT: 2500 mL / NET: -2020 mL          PHYSICAL EXAM:  Constitutional: NAD, resting comfortably  Neck: No JVD  Respiratory: CTAB, no wheezes, rales or rhonchi, normal resp effort  Cardiovascular: S1, S2, RRR  Gastrointestinal: BS+, soft, NT/ND  Neuro: awake, AOx3  Extremities: No peripheral edema, no cyanosis  Skin: warm and dry  Vascular access: HD catheter    Hospital Medications:   MEDICATIONS  (STANDING):  amLODIPine   Tablet 10 milliGRAM(s) Oral daily  apixaban 2.5 milliGRAM(s) Oral two times a day  atorvastatin 80 milliGRAM(s) Oral at bedtime  carvedilol 12.5 milliGRAM(s) Oral every 12 hours  chlorhexidine 2% Cloths 1 Application(s) Topical daily  dextrose 40% Gel 15 Gram(s) Oral once  dextrose 5%. 1000 milliLiter(s) (50 mL/Hr) IV Continuous <Continuous>  dextrose 5%. 1000 milliLiter(s) (100 mL/Hr) IV Continuous <Continuous>  dextrose 50% Injectable 25 Gram(s) IV Push once  dextrose 50% Injectable 12.5 Gram(s) IV Push once  dextrose 50% Injectable 25 Gram(s) IV Push once  epoetin cortney-epbx (RETACRIT) Injectable 65657 Unit(s) IV Push <User Schedule>  glucagon  Injectable 1 milliGRAM(s) IntraMuscular once  insulin lispro (ADMELOG) corrective regimen sliding scale   SubCutaneous three times a day before meals  insulin lispro (ADMELOG) corrective regimen sliding scale   SubCutaneous at bedtime  levETIRAcetam 250 milliGRAM(s) Oral two times a day  levETIRAcetam 125 milliGRAM(s) Oral <User Schedule>  levothyroxine 50 MICROGram(s) Oral daily  montelukast 10 milliGRAM(s) Oral daily  pantoprazole    Tablet 40 milliGRAM(s) Oral before breakfast  polyethylene glycol 3350 17 Gram(s) Oral daily  sucralfate suspension 1 Gram(s) Oral every 12 hours      LABS:  02-12    132<L>  |  95<L>  |  35<H>  ----------------------------<  107<H>  4.4   |  22  |  6.01<H>    Ca    8.9      12 Feb 2022 07:11      Creatinine Trend: 6.01 <--, 6.44 <--, 4.54 <--, 5.68 <--, 6.33 <--, 8.97 <--                                8.5    7.13  )-----------( 211      ( 12 Feb 2022 06:46 )             27.2     Urine Studies:      Iron 55, TIBC 191, %sat 29      [02-04-22 @ 00:04]  Ferritin 1287      [02-04-22 @ 00:04]  PTH -- (Ca 9.2)      [02-05-22 @ 10:13]   294  HbA1c 6.0      [02-21-20 @ 08:53]  Lipid: chol 118, TG 54, HDL 59, LDL --      [06-27-21 @ 09:44]      Free Light Chains: kappa 22.34, lambda 13.02, ratio = 1.72      [02-07 @ 09:29]  Immunofixation Serum:   No Monoclonal Band Identified    Reference Range: None Detected      [02-07-22 @ 09:29]  SPEP Interpretation: Normal Electrophoresis Pattern      [02-04-22 @ 22:50]    RADIOLOGY & ADDITIONAL STUDIES:  
INTERVAL HPI/OVERNIGHT EVENTS:  No new overnight event.  No N/V/D.  Tolerating diet.  no BM since Sunday  planned for EGD tomorrow pending correction of Na  for HD today    Allergies    No Known Allergies    Intolerances    General:  No wt loss, fevers, chills, night sweats, fatigue,   Eyes:  Good vision, no reported pain  ENT:  No sore throat, pain, runny nose, dysphagia  CV:  No pain, palpitations, hypo/hypertension  Resp:  No dyspnea, cough, tachypnea, wheezing  GI:  No pain, No nausea, No vomiting, No diarrhea, No constipation, No weight loss, No fever, No pruritis, No rectal bleeding, No tarry stools, No dysphagia,  :  No pain, bleeding, incontinence, nocturia  Muscle:  No pain, weakness  Neuro:  No weakness, tingling, memory problems  Psych:  No fatigue, insomnia, mood problems, depression  Endocrine:  No polyuria, polydipsia, cold/heat intolerance  Heme:  No petechiae, ecchymosis, easy bruisability  Skin:  No rash, tattoos, scars, edema      PHYSICAL EXAM:   Vital Signs Last 24 Hrs  T(C): 36.9 (08 Feb 2022 10:34), Max: 36.9 (08 Feb 2022 10:34)  T(F): 98.4 (08 Feb 2022 10:34), Max: 98.4 (08 Feb 2022 10:34)  HR: 54 (08 Feb 2022 10:34) (54 - 63)  BP: 158/67 (08 Feb 2022 10:34) (139/63 - 160/74)  BP(mean): 99 (07 Feb 2022 17:00) (99 - 107)  RR: 17 (08 Feb 2022 10:34) (17 - 18)  SpO2: 99% (08 Feb 2022 10:34) (98% - 100%)  Daily     Daily   I&O's Summary      GENERAL:  Appears stated age, well-groomed, well-nourished, no distress  HEENT:  NC/AT,  conjunctivae clear and pink, no thyromegaly, nodules, adenopathy, no JVD, sclera -anicteric  CHEST:  Full & symmetric excursion, no increased effort, breath sounds clear  HEART:  Regular rhythm, S1, S2, no murmur/rub/S3/S4, no abdominal bruit, no edema  ABDOMEN:  Soft, non-tender, non-distended, normoactive bowel sounds,  no masses ,no hepato-splenomegaly, no signs of chronic liver disease  EXTEREMITIES:  no cyanosis,clubbing or edema  SKIN:  No rash/erythema/ecchymoses/petechiae/wounds/abscess/warm/dry  NEURO:  Alert, oriented, no asterixis, no tremor, no encephalopathy      LABS:                                   7.2    5.63  )-----------( 189      ( 08 Feb 2022 07:41 )             22.2   02-08    126<L>  |  91<L>  |  29<H>  ----------------------------<  114<H>  4.5   |  20<L>  |  6.33<H>    Ca    8.3<L>      08 Feb 2022 07:40  Phos  4.4     02-08  Mg     1.9     02-08      amylase   lipase  RADIOLOGY & ADDITIONAL TESTS:  
INTERVAL HPI/OVERNIGHT EVENTS:  pt seen and examined, s/p EGD showing non bleeding gastric and duodenal ulcers  reports brown BM yesterday, no melena  no BM today     Allergies    No Known Allergies    Intolerances    General:  No wt loss, fevers, chills, night sweats, fatigue,   Eyes:  Good vision, no reported pain  ENT:  No sore throat, pain, runny nose, dysphagia  CV:  No pain, palpitations, hypo/hypertension  Resp:  No dyspnea, cough, tachypnea, wheezing  GI:  No pain, No nausea, No vomiting, No diarrhea, No constipation, No weight loss, No fever, No pruritis, No rectal bleeding, No tarry stools, No dysphagia,  :  No pain, bleeding, incontinence, nocturia  Muscle:  No pain, weakness  Neuro:  No weakness, tingling, memory problems  Psych:  No fatigue, insomnia, mood problems, depression  Endocrine:  No polyuria, polydipsia, cold/heat intolerance  Heme:  No petechiae, ecchymosis, easy bruisability  Skin:  No rash, tattoos, scars, edema      PHYSICAL EXAM:   Vital Signs Last 24 Hrs  T(C): 36.9 (08 Feb 2022 10:34), Max: 36.9 (08 Feb 2022 10:34)  T(F): 98.4 (08 Feb 2022 10:34), Max: 98.4 (08 Feb 2022 10:34)  HR: 54 (08 Feb 2022 10:34) (54 - 63)  BP: 158/67 (08 Feb 2022 10:34) (139/63 - 160/74)  BP(mean): 99 (07 Feb 2022 17:00) (99 - 107)  RR: 17 (08 Feb 2022 10:34) (17 - 18)  SpO2: 99% (08 Feb 2022 10:34) (98% - 100%)  Daily     Daily   I&O's Summary      GENERAL:  Appears stated age, well-groomed, well-nourished, no distress  HEENT:  NC/AT,  conjunctivae clear and pink, no thyromegaly, nodules, adenopathy, no JVD, sclera -anicteric  CHEST:  Full & symmetric excursion, no increased effort, breath sounds clear  HEART:  Regular rhythm, S1, S2, no murmur/rub/S3/S4, no abdominal bruit, no edema  ABDOMEN:  Soft, non-tender, non-distended, normoactive bowel sounds,  no masses ,no hepato-splenomegaly, no signs of chronic liver disease  EXTEREMITIES:  no cyanosis,clubbing or edema  SKIN:  No rash/erythema/ecchymoses/petechiae/wounds/abscess/warm/dry  NEURO:  Alert, oriented, no asterixis, no tremor, no encephalopathy      LABS:                                   7.2    5.63  )-----------( 189      ( 08 Feb 2022 07:41 )             22.2   02-08    126<L>  |  91<L>  |  29<H>  ----------------------------<  114<H>  4.5   |  20<L>  |  6.33<H>    Ca    8.3<L>      08 Feb 2022 07:40  Phos  4.4     02-08  Mg     1.9     02-08      amylase   lipase  RADIOLOGY & ADDITIONAL TESTS:  
[facility] [place] [facility]   MD NINA MASON MD RUORU WONG, PA    TEL:  OFFICE: [phone]  DR HSU CELL: [phone]  RAS MORELOS CELL: [phone]  DR. MARQUEZ CELL: [phone]      FROM 5 PM - 7 AM PLEASE CALL ANSWERING SERVICE: [phone]    RENAL FOLLOW UP NOTE--Date of Service 02-10-22 @ 12:13  --------------------------------------------------------------------------------  HPI:      Pt seen and examined at bedside.   Denies SOB, chest pain     PAST HISTORY  --------------------------------------------------------------------------------  No significant changes to PMH, PSH, FHx, SHx, unless otherwise noted    ALLERGIES & MEDICATIONS  --------------------------------------------------------------------------------  Allergies    No Known Allergies    Intolerances      Standing Inpatient Medications  amLODIPine   Tablet 10 milliGRAM(s) Oral daily  atorvastatin 80 milliGRAM(s) Oral at bedtime  carvedilol 12.5 milliGRAM(s) Oral every 12 hours  chlorhexidine 2% Cloths 1 Application(s) Topical daily  dextrose 40% Gel 15 Gram(s) Oral once  dextrose 5%. 1000 milliLiter(s) IV Continuous <Continuous>  dextrose 5%. 1000 milliLiter(s) IV Continuous <Continuous>  dextrose 50% Injectable 25 Gram(s) IV Push once  dextrose 50% Injectable 12.5 Gram(s) IV Push once  dextrose 50% Injectable 25 Gram(s) IV Push once  epoetin cortney-epbx (RETACRIT) Injectable 88072 Unit(s) IV Push <User Schedule>  glucagon  Injectable 1 milliGRAM(s) IntraMuscular once  heparin  Infusion 1100 Unit(s)/Hr IV Continuous <Continuous>  insulin lispro (ADMELOG) corrective regimen sliding scale   SubCutaneous three times a day before meals  insulin lispro (ADMELOG) corrective regimen sliding scale   SubCutaneous at bedtime  levETIRAcetam 125 milliGRAM(s) Oral <User Schedule>  levETIRAcetam 250 milliGRAM(s) Oral two times a day  levothyroxine 50 MICROGram(s) Oral daily  montelukast 10 milliGRAM(s) Oral daily  pantoprazole    Tablet 40 milliGRAM(s) Oral before breakfast  sodium chloride 0.9%. 500 milliLiter(s) IV Continuous <Continuous>  sucralfate suspension 1 Gram(s) Oral every 12 hours    PRN Inpatient Medications  acetaminophen     Tablet .. 650 milliGRAM(s) Oral every 6 hours PRN  melatonin 3 milliGRAM(s) Oral at bedtime PRN  ondansetron Injectable 4 milliGRAM(s) IV Push every 8 hours PRN      REVIEW OF SYSTEMS  --------------------------------------------------------------------------------  General: no fever  CVS: no chest pain  RESP: no sob, no cough  ABD: no abdominal pain  : no dysuria,  MSK: no edema     VITALS/PHYSICAL EXAM  --------------------------------------------------------------------------------  T(C): 36.8 (02-10-22 @ 05:39), Max: 36.8 (02-10-22 @ 05:39)  HR: 62 (02-10-22 @ 10:48) (51 - 62)  BP: 139/68 (02-10-22 @ 10:48) (114/61 - 154/56)  RR: 18 (02-10-22 @ 05:39) (17 - 18)  SpO2: 97% (02-10-22 @ 10:48) (97% - 100%)  Wt(kg): --  Height (cm): 175.3 (02-09-22 @ 10:54)  Weight (kg): 63.1 (02-09-22 @ 10:54)  BMI (kg/m2): 20.5 (02-09-22 @ 10:54)  BSA (m2): 1.77 (02-09-22 @ 10:54)      02-09-22 @ 07:01  -  02-10-22 @ 07:00  --------------------------------------------------------  IN: 1277 mL / OUT: 3000 mL / NET: -1723 mL      Physical Exam:  	Gen: NAD  	HEENT: MMM  	Pulm: CTA B/L  	CV: S1S2  	Abd: Soft, +BS  	Ext: No LE edema B/L                      Neuro: Awake   	Skin: Warm and Dry   	Vascular access:  HD catheter           [facility] tucker  LABS/STUDIES  --------------------------------------------------------------------------------              8.2    6.56  >-----------<  202      [02-10-22 @ 07:14]              25.7     132  |  95  |  18  ----------------------------<  94      [02-10-22 @ 07:07]  4.0   |  27  |  4.54        Ca     8.6     [02-10-22 @ 07:07]      Mg     1.9     [02-10-22 @ 07:07]        PTT: 60.0       [02-10-22 @ 07:10]      Creatinine Trend:  SCr 4.54 [02-10 @ 07:07]  SCr 5.68 [02-09 @ 06:20]  SCr 6.33 [02-08 @ 07:40]  SCr 8.97 [02-07 @ 06:35]  SCr 6.29 [02-04 @ 06:27]        Iron 55, TIBC 191, %sat 29      [02-04-22 @ 00:04]  Ferritin 1287      [02-04-22 @ 00:04]  PTH -- (Ca 9.2)      [02-05-22 @ 10:13]   294  HbA1c 6.0      [02-21-20 @ 08:53]  Lipid: chol 118, TG 54, HDL 59, LDL --      [06-27-21 @ 09:44]      Free Light Chains: kappa 22.34, lambda 13.02, ratio = 1.72      [02-07 @ 09:29]  Immunofixation Serum:   No Monoclonal Band Identified    Reference Range: None Detected      [02-07-22 @ 09:29]  
Patient is a 66y old  Male who presents with a chief complaint of Melena and anemia (10 Feb 2022 12:34)    Patient seen this morning. No new complaints. S/P EGD yesterday. No further GI bleed or melena.    MEDICATIONS  (STANDING):  amLODIPine   Tablet 10 milliGRAM(s) Oral daily  atorvastatin 80 milliGRAM(s) Oral at bedtime  carvedilol 12.5 milliGRAM(s) Oral every 12 hours  chlorhexidine 2% Cloths 1 Application(s) Topical daily  dextrose 40% Gel 15 Gram(s) Oral once  dextrose 5%. 1000 milliLiter(s) (50 mL/Hr) IV Continuous <Continuous>  dextrose 5%. 1000 milliLiter(s) (100 mL/Hr) IV Continuous <Continuous>  dextrose 50% Injectable 25 Gram(s) IV Push once  dextrose 50% Injectable 12.5 Gram(s) IV Push once  dextrose 50% Injectable 25 Gram(s) IV Push once  epoetin cortney-epbx (RETACRIT) Injectable 17178 Unit(s) IV Push <User Schedule>  glucagon  Injectable 1 milliGRAM(s) IntraMuscular once  heparin  Infusion 1100 Unit(s)/Hr (11 mL/Hr) IV Continuous <Continuous>  insulin lispro (ADMELOG) corrective regimen sliding scale   SubCutaneous three times a day before meals  insulin lispro (ADMELOG) corrective regimen sliding scale   SubCutaneous at bedtime  levETIRAcetam 125 milliGRAM(s) Oral <User Schedule>  levETIRAcetam 250 milliGRAM(s) Oral two times a day  levothyroxine 50 MICROGram(s) Oral daily  montelukast 10 milliGRAM(s) Oral daily  pantoprazole    Tablet 40 milliGRAM(s) Oral before breakfast  sodium chloride 0.9%. 500 milliLiter(s) (30 mL/Hr) IV Continuous <Continuous>  sucralfate suspension 1 Gram(s) Oral every 12 hours    MEDICATIONS  (PRN):  acetaminophen     Tablet .. 650 milliGRAM(s) Oral every 6 hours PRN Temp greater or equal to 38C (100.4F), Mild Pain (1 - 3)  melatonin 3 milliGRAM(s) Oral at bedtime PRN Insomnia  ondansetron Injectable 4 milliGRAM(s) IV Push every 8 hours PRN Nausea and/or Vomiting      ROS  No fever, sweats, chills  No epistaxis, HA, sore throat  No CP, SOB, cough, sputum  No n/v/d, abd pain, melena, hematochezia  No edema  No rash  No anxiety  No back pain, joint pain  No bleeding, bruising  No dysuria, hematuria    Vital Signs Last 24 Hrs  T(C): 36.8 (10 Feb 2022 05:39), Max: 36.8 (10 Feb 2022 05:39)  T(F): 98.2 (10 Feb 2022 05:39), Max: 98.2 (10 Feb 2022 05:39)  HR: 62 (10 Feb 2022 10:48) (51 - 62)  BP: 139/68 (10 Feb 2022 10:48) (114/61 - 154/56)  BP(mean): --  RR: 18 (10 Feb 2022 05:39) (17 - 18)  SpO2: 97% (10 Feb 2022 10:48) (97% - 100%)    PE  NAD  Awake, alert  Anicteric, MMM  No c/c/e  No rash grossly                            8.2    6.56  )-----------( 202      ( 10 Feb 2022 07:14 )             25.7       02-10    132<L>  |  95<L>  |  18  ----------------------------<  94  4.0   |  27  |  4.54<H>    Ca    8.6      10 Feb 2022 07:07  Mg     1.9     02-10        [facility]  ____________________________________________________________________________________________________  Patient Name: Srinivasan Castelan                        MRN: [id number]  Account Number: [id number]                     YOB: 1955  Room: Endoscopy Room 3                           Gender: Male  Attending MD: Jimenez Witt MD                 Procedure Date No Time: 2/9/2022  ____________________________________________________________________________________________________     Procedure:           Upper GI endoscopy  Indications:         Occult blood in stool  Providers:           Jimenez Witt MD  Medicines:           Monitored Anesthesia Care  Complications:       No immediate complications.  ____________________________________________________________________________________________________  Procedure:           After obtaining informed consent, the endoscope was passed under direct                        vision. Throughout the procedure, the patient's blood pressure, pulse, and                        oxygen saturations were monitored continuously. The Endoscope was introduced                        through the mouth, and advanced to the second part of duodenum. The upper GI                        endoscopy was accomplished without difficulty. The patient tolerated the                        procedure well.                                                                                                        Findings:       The examined esophagus was normal.       Few non-bleeding superficial gastric ulcers with no stigmata of bleeding were found in the        gastric antrum. Biopsies were taken with a cold forceps for histology. Estimated blood loss:        none.       Few non-bleeding superficial duodenal ulcers with no stigmata of bleeding were found in the        duodenal bulb.                                                                                                        Impression:          - Normal esophagus.                       - Non-bleeding gastric ulcers with no stigmata of bleeding. Biopsied.                       - Multiple non-bleeding duodenal ulcers with no stigmata of bleeding.  Recommendation:      - [facility].                       - Resume regular diet.                       - Await pathology results.                       - Use Protonix (pantoprazole) 40 mg PO daily.                       - Repeat upper endoscopy in 8 weeks to check healing; at that time will need                        repeat colonoscopy given history of polyps                       - Discussed with patient                                                                                                        Attending Participation:       I personally performed the entire procedure.                                                                                                            _________________  Jimenez Witt MD  2/9/2022 11:29:14 AM  Number of Addenda: 0    
Patient is a 66y old  Male who presents with a chief complaint of Melena and anemia (2022 10:23)      HPI:  Feels dizzy at times. 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    Anemia    PEG (percutaneous endoscopic gastrostomy) status  removed 2020    Intubation of airway performed without difficulty  Dec 2019  CVA c/b status epilepticus requiring intubation and PEG in 2019-    ESRD on dialysis  M/W/F    History of insertion of stent into coronary artery bypass graft   and 2019    S/P CABG x 3  off pump C3L on 20        Review of Systems:   CONSTITUTIONAL: No fever, weight loss, or fatigue  EYES: No eye pain, visual disturbances, or discharge  ENMT:  No difficulty hearing, tinnitus, vertigo; No sinus or throat pain  NECK: No pain or stiffness  BREASTS: No pain, masses, or nipple discharge  RESPIRATORY: No cough, wheezing, chills or hemoptysis; No shortness of breath  CARDIOVASCULAR: No chest pain, palpitations, dizziness, or leg swelling  GASTROINTESTINAL: No abdominal or epigastric pain. No nausea, vomiting, or hematemesis; No diarrhea or constipation. No melena or hematochezia.  GENITOURINARY: No dysuria, frequency, hematuria, or incontinence  NEUROLOGICAL: No headaches, memory loss, loss of strength, numbness, or tremors  SKIN: No itching, burning, rashes, or lesions   LYMPH NODES: No enlarged glands  ENDOCRINE: No heat or cold intolerance; No hair loss  MUSCULOSKELETAL: No joint pain or swelling; No muscle, back, or extremity pain  PSYCHIATRIC: No depression, anxiety, mood swings, or difficulty sleeping  HEME/LYMPH: No easy bruising, or bleeding gums  ALLERY AND IMMUNOLOGIC: No hives or eczema    Allergies    No Known Allergies    Intolerances        Social History:     FAMILY HISTORY:  Family history of cancer of tongue (Father)  Parents are  . Father - at 80 years, tongue cancer was a smoker. Mother- at 71, had accident while crossing road. Siblings- 2 brothers and one sister.        MEDICATIONS  (STANDING):  amLODIPine   Tablet 5 milliGRAM(s) Oral daily  atorvastatin 80 milliGRAM(s) Oral at bedtime  carvedilol 6.25 milliGRAM(s) Oral every 12 hours  dextrose 40% Gel 15 Gram(s) Oral once  dextrose 5%. 1000 milliLiter(s) (50 mL/Hr) IV Continuous <Continuous>  dextrose 5%. 1000 milliLiter(s) (100 mL/Hr) IV Continuous <Continuous>  dextrose 50% Injectable 25 Gram(s) IV Push once  dextrose 50% Injectable 12.5 Gram(s) IV Push once  dextrose 50% Injectable 25 Gram(s) IV Push once  epoetin cortney-epbx (RETACRIT) Injectable 71086 Unit(s) IV Push <User Schedule>  glucagon  Injectable 1 milliGRAM(s) IntraMuscular once  insulin lispro (ADMELOG) corrective regimen sliding scale   SubCutaneous three times a day before meals  insulin lispro (ADMELOG) corrective regimen sliding scale   SubCutaneous at bedtime  levETIRAcetam 250 milliGRAM(s) Oral two times a day  levothyroxine 50 MICROGram(s) Oral daily  pantoprazole  Injectable 40 milliGRAM(s) IV Push two times a day    MEDICATIONS  (PRN):  acetaminophen     Tablet .. 650 milliGRAM(s) Oral every 6 hours PRN Temp greater or equal to 38C (100.4F), Mild Pain (1 - 3)  melatonin 3 milliGRAM(s) Oral at bedtime PRN Insomnia  ondansetron Injectable 4 milliGRAM(s) IV Push every 8 hours PRN Nausea and/or Vomiting        CAPILLARY BLOOD GLUCOSE      POCT Blood Glucose.: 110 mg/dL (2022 19:26)  POCT Blood Glucose.: 159 mg/dL (2022 07:56)  POCT Blood Glucose.: 259 mg/dL (2022 20:13)    I&O's Summary    2022 07:01  -  2022 20:09  --------------------------------------------------------  IN: 0 mL / OUT: 2000 mL / NET: -2000 mL        PHYSICAL EXAM:  Vital Signs Last 24 Hrs  T(C): 36.8 (2022 18:10), Max: 36.9 (2022 20:53)  T(F): 98.2 (2022 18:10), Max: 98.4 (2022 20:53)  HR: 63 (2022 18:10) (63 - 89)  BP: 167/78 (2022 18:10) (156/75 - 181/74)  BP(mean): --  RR: 18 (2022 18:10) (17 - 18)  SpO2: 95% (2022 18:10) (94% - 98%)    GENERAL: NAD, well-developed  HEAD:  Atraumatic, Normocephalic  EYES: EOMI, PERRLA, conjunctiva and sclera clear  NECK: Supple, No JVD  CHEST/LUNG: Clear to auscultation bilaterally; No wheeze  HEART: Regular rate and rhythm; No murmurs, rubs, or gallops  ABDOMEN: Soft, Nontender, Nondistended; Bowel sounds present  EXTREMITIES:  2+ Peripheral Pulses, No clubbing, cyanosis, or edema  PSYCH: AAOx3  NEUROLOGY: non-focal  SKIN: No rashes or lesions    LABS:                        7.1    8.71  )-----------( 239      ( 2022 06:27 )             23.7     02-04    136  |  98  |  58<H>  ----------------------------<  148<H>  4.8   |  21<L>  |  6.29<H>    Ca    9.2      2022 06:27  Phos  4.6     02-04  Mg     2.3     02-04    TPro  7.6  /  Alb  4.1  /  TBili  0.3  /  DBili  x   /  AST  11  /  ALT  13  /  AlkPhos  161<H>  02-04    PT/INR - ( 2022 18:58 )   PT: 16.7 sec;   INR: 1.41 ratio         PTT - ( 2022 18:58 )  PTT:38.3 sec          RADIOLOGY & ADDITIONAL TESTS:    Imaging Personally Reviewed:    Consultant(s) Notes Reviewed:      Care Discussed with Consultants/Other Providers:  
Patient is a 66y old  Male who presents with a chief complaint of Melena and anemia (2022 10:23)    2022  HPI:  Events noted.   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    Anemia    PEG (percutaneous endoscopic gastrostomy) status  removed 2020    Intubation of airway performed without difficulty  Dec 2019  CVA c/b status epilepticus requiring intubation and PEG in 2019-    ESRD on dialysis  M/W/F    History of insertion of stent into coronary artery bypass graft   and 2019    S/P CABG x 3  off pump C3L on 20        Review of Systems:   CONSTITUTIONAL: No fever, weight loss, or fatigue  EYES: No eye pain, visual disturbances, or discharge  ENMT:  No difficulty hearing, tinnitus, vertigo; No sinus or throat pain  NECK: No pain or stiffness  BREASTS: No pain, masses, or nipple discharge  RESPIRATORY: No cough, wheezing, chills or hemoptysis; No shortness of breath  CARDIOVASCULAR: No chest pain, palpitations, dizziness, or leg swelling  GASTROINTESTINAL: No abdominal or epigastric pain. No nausea, vomiting, or hematemesis; No diarrhea or constipation. No melena or hematochezia.  GENITOURINARY: No dysuria, frequency, hematuria, or incontinence  NEUROLOGICAL: No headaches, memory loss, loss of strength, numbness, or tremors  SKIN: No itching, burning, rashes, or lesions   LYMPH NODES: No enlarged glands  ENDOCRINE: No heat or cold intolerance; No hair loss  MUSCULOSKELETAL: No joint pain or swelling; No muscle, back, or extremity pain  PSYCHIATRIC: No depression, anxiety, mood swings, or difficulty sleeping  HEME/LYMPH: No easy bruising, or bleeding gums  ALLERY AND IMMUNOLOGIC: No hives or eczema    Allergies    No Known Allergies    Intolerances        Social History:     FAMILY HISTORY:  Family history of cancer of tongue (Father)  Parents are  . Father - at 80 years, tongue cancer was a smoker. Mother- at 71, had accident while crossing road. Siblings- 2 brothers and one sister.        MEDICATIONS  (STANDING):  amLODIPine   Tablet 5 milliGRAM(s) Oral daily  atorvastatin 80 milliGRAM(s) Oral at bedtime  carvedilol 6.25 milliGRAM(s) Oral every 12 hours  dextrose 40% Gel 15 Gram(s) Oral once  dextrose 5%. 1000 milliLiter(s) (50 mL/Hr) IV Continuous <Continuous>  dextrose 5%. 1000 milliLiter(s) (100 mL/Hr) IV Continuous <Continuous>  dextrose 50% Injectable 25 Gram(s) IV Push once  dextrose 50% Injectable 12.5 Gram(s) IV Push once  dextrose 50% Injectable 25 Gram(s) IV Push once  epoetin cortney-epbx (RETACRIT) Injectable 45769 Unit(s) IV Push <User Schedule>  glucagon  Injectable 1 milliGRAM(s) IntraMuscular once  insulin lispro (ADMELOG) corrective regimen sliding scale   SubCutaneous three times a day before meals  insulin lispro (ADMELOG) corrective regimen sliding scale   SubCutaneous at bedtime  levETIRAcetam 250 milliGRAM(s) Oral two times a day  levothyroxine 50 MICROGram(s) Oral daily  pantoprazole  Injectable 40 milliGRAM(s) IV Push two times a day    MEDICATIONS  (PRN):  acetaminophen     Tablet .. 650 milliGRAM(s) Oral every 6 hours PRN Temp greater or equal to 38C (100.4F), Mild Pain (1 - 3)  melatonin 3 milliGRAM(s) Oral at bedtime PRN Insomnia  ondansetron Injectable 4 milliGRAM(s) IV Push every 8 hours PRN Nausea and/or Vomiting        CAPILLARY BLOOD GLUCOSE      POCT Blood Glucose.: 110 mg/dL (2022 19:26)  POCT Blood Glucose.: 159 mg/dL (2022 07:56)  POCT Blood Glucose.: 259 mg/dL (2022 20:13)    I&O's Summary    2022 07:01  -  2022 20:09  --------------------------------------------------------  IN: 0 mL / OUT: 2000 mL / NET: -2000 mL        PHYSICAL EXAM:  Vital Signs Last 24 Hrs  T(C): 36.8 (2022 18:10), Max: 36.9 (2022 20:53)  T(F): 98.2 (2022 18:10), Max: 98.4 (2022 20:53)  HR: 63 (2022 18:10) (63 - 89)  BP: 167/78 (2022 18:10) (156/75 - 181/74)  BP(mean): --  RR: 18 (2022 18:10) (17 - 18)  SpO2: 95% (2022 18:10) (94% - 98%)    GENERAL: NAD, well-developed  HEAD:  Atraumatic, Normocephalic  EYES: EOMI, PERRLA, conjunctiva and sclera clear  NECK: Supple, No JVD  CHEST/LUNG: Clear to auscultation bilaterally; No wheeze  HEART: Regular rate and rhythm; No murmurs, rubs, or gallops  ABDOMEN: Soft, Nontender, Nondistended; Bowel sounds present  EXTREMITIES:  2+ Peripheral Pulses, No clubbing, cyanosis, or edema  PSYCH: AAOx3  NEUROLOGY: non-focal  SKIN: No rashes or lesions    LABS:                        7.1    8.71  )-----------( 239      ( 2022 06:27 )             23.7     02-04    136  |  98  |  58<H>  ----------------------------<  148<H>  4.8   |  21<L>  |  6.29<H>    Ca    9.2      2022 06:27  Phos  4.6     02-04  Mg     2.3     02-04    TPro  7.6  /  Alb  4.1  /  TBili  0.3  /  DBili  x   /  AST  11  /  ALT  13  /  AlkPhos  161<H>  02-04    PT/INR - ( 2022 18:58 )   PT: 16.7 sec;   INR: 1.41 ratio         PTT - ( 2022 18:58 )  PTT:38.3 sec          RADIOLOGY & ADDITIONAL TESTS:    Imaging Personally Reviewed:    Consultant(s) Notes Reviewed:      Care Discussed with Consultants/Other Providers:  
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    Anemia    PEG (percutaneous endoscopic gastrostomy) status  removed 2020    Intubation of airway performed without difficulty  Dec 2019  CVA c/b status epilepticus requiring intubation and PEG in 2019-    ESRD on dialysis  M/W/F    History of insertion of stent into coronary artery bypass graft   and 2019    S/P CABG x 3  off pump C3L on 20        Review of Systems:   CONSTITUTIONAL: No fever, weight loss, or fatigue  EYES: No eye pain, visual disturbances, or discharge  ENMT:  No difficulty hearing, tinnitus, vertigo; No sinus or throat pain  NECK: No pain or stiffness  BREASTS: No pain, masses, or nipple discharge  RESPIRATORY: No cough, wheezing, chills or hemoptysis; No shortness of breath  CARDIOVASCULAR: No chest pain, palpitations, dizziness, or leg swelling  GASTROINTESTINAL: No abdominal or epigastric pain. No nausea, vomiting, or hematemesis; No diarrhea or constipation. No melena or hematochezia.  GENITOURINARY: No dysuria, frequency, hematuria, or incontinence  NEUROLOGICAL: No headaches, memory loss, loss of strength, numbness, or tremors  SKIN: No itching, burning, rashes, or lesions   LYMPH NODES: No enlarged glands  ENDOCRINE: No heat or cold intolerance; No hair loss  MUSCULOSKELETAL: No joint pain or swelling; No muscle, back, or extremity pain  PSYCHIATRIC: No depression, anxiety, mood swings, or difficulty sleeping  HEME/LYMPH: No easy bruising, or bleeding gums  ALLERY AND IMMUNOLOGIC: No hives or eczema    Allergies    No Known Allergies    Intolerances        Social History:     FAMILY HISTORY:  Family history of cancer of tongue (Father)  Parents are  . Father - at 80 years, tongue cancer was a smoker. Mother- at 71, had accident while crossing road. Siblings- 2 brothers and one sister.        MEDICATIONS  (STANDING):  amLODIPine   Tablet 5 milliGRAM(s) Oral daily  atorvastatin 80 milliGRAM(s) Oral at bedtime  carvedilol 6.25 milliGRAM(s) Oral every 12 hours  dextrose 40% Gel 15 Gram(s) Oral once  dextrose 5%. 1000 milliLiter(s) (50 mL/Hr) IV Continuous <Continuous>  dextrose 5%. 1000 milliLiter(s) (100 mL/Hr) IV Continuous <Continuous>  dextrose 50% Injectable 25 Gram(s) IV Push once  dextrose 50% Injectable 12.5 Gram(s) IV Push once  dextrose 50% Injectable 25 Gram(s) IV Push once  epoetin cortney-epbx (RETACRIT) Injectable 34956 Unit(s) IV Push <User Schedule>  glucagon  Injectable 1 milliGRAM(s) IntraMuscular once  insulin lispro (ADMELOG) corrective regimen sliding scale   SubCutaneous three times a day before meals  insulin lispro (ADMELOG) corrective regimen sliding scale   SubCutaneous at bedtime  levETIRAcetam 250 milliGRAM(s) Oral two times a day  levothyroxine 50 MICROGram(s) Oral daily  pantoprazole  Injectable 40 milliGRAM(s) IV Push two times a day    MEDICATIONS  (PRN):  acetaminophen     Tablet .. 650 milliGRAM(s) Oral every 6 hours PRN Temp greater or equal to 38C (100.4F), Mild Pain (1 - 3)  melatonin 3 milliGRAM(s) Oral at bedtime PRN Insomnia  ondansetron Injectable 4 milliGRAM(s) IV Push every 8 hours PRN Nausea and/or Vomiting        CAPILLARY BLOOD GLUCOSE      POCT Blood Glucose.: 110 mg/dL (2022 19:26)  POCT Blood Glucose.: 159 mg/dL (2022 07:56)  POCT Blood Glucose.: 259 mg/dL (2022 20:13)    I&O's Summary    2022 07:01  -  2022 20:09  --------------------------------------------------------  IN: 0 mL / OUT: 2000 mL / NET: -2000 mL        PHYSICAL EXAM:  Vital Signs Last 24 Hrs  T(C): 36.8 (2022 18:10), Max: 36.9 (2022 20:53)  T(F): 98.2 (2022 18:10), Max: 98.4 (2022 20:53)  HR: 63 (2022 18:10) (63 - 89)  BP: 167/78 (2022 18:10) (156/75 - 181/74)  BP(mean): --  RR: 18 (2022 18:10) (17 - 18)  SpO2: 95% (2022 18:10) (94% - 98%)    GENERAL: NAD, well-developed  HEAD:  Atraumatic, Normocephalic  EYES: EOMI, PERRLA, conjunctiva and sclera clear  NECK: Supple, No JVD  CHEST/LUNG: Clear to auscultation bilaterally; No wheeze  HEART: Regular rate and rhythm; No murmurs, rubs, or gallops  ABDOMEN: Soft, Nontender, Nondistended; Bowel sounds present  EXTREMITIES:  2+ Peripheral Pulses, No clubbing, cyanosis, or edema  PSYCH: AAOx3  NEUROLOGY: non-focal  SKIN: No rashes or lesions    LABS:                        7.1    8.71  )-----------( 239      ( 2022 06:27 )             23.7     02-04    136  |  98  |  58<H>  ----------------------------<  148<H>  4.8   |  21<L>  |  6.29<H>    Ca    9.2      2022 06:27  Phos  4.6     02-04  Mg     2.3     02-04    TPro  7.6  /  Alb  4.1  /  TBili  0.3  /  DBili  x   /  AST  11  /  ALT  13  /  AlkPhos  161<H>  02-04    PT/INR - ( 2022 18:58 )   PT: 16.7 sec;   INR: 1.41 ratio         PTT - ( 2022 18:58 )  PTT:38.3 sec          RADIOLOGY & ADDITIONAL TESTS:    Imaging Personally Reviewed:    Consultant(s) Notes Reviewed:      Care Discussed with Consultants/Other Providers:  
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    Anemia    PEG (percutaneous endoscopic gastrostomy) status  removed 2020    Intubation of airway performed without difficulty  Dec 2019  CVA c/b status epilepticus requiring intubation and PEG in 2019-    ESRD on dialysis  M/W/F    History of insertion of stent into coronary artery bypass graft   and 2019    S/P CABG x 3  off pump C3L on 20        Review of Systems:   CONSTITUTIONAL: No fever, weight loss, or fatigue  EYES: No eye pain, visual disturbances, or discharge  ENMT:  No difficulty hearing, tinnitus, vertigo; No sinus or throat pain  NECK: No pain or stiffness  BREASTS: No pain, masses, or nipple discharge  RESPIRATORY: No cough, wheezing, chills or hemoptysis; No shortness of breath  CARDIOVASCULAR: No chest pain, palpitations, dizziness, or leg swelling  GASTROINTESTINAL: No abdominal or epigastric pain. No nausea, vomiting, or hematemesis; No diarrhea or constipation. No melena or hematochezia.  GENITOURINARY: No dysuria, frequency, hematuria, or incontinence  NEUROLOGICAL: No headaches, memory loss, loss of strength, numbness, or tremors  SKIN: No itching, burning, rashes, or lesions   LYMPH NODES: No enlarged glands  ENDOCRINE: No heat or cold intolerance; No hair loss  MUSCULOSKELETAL: No joint pain or swelling; No muscle, back, or extremity pain  PSYCHIATRIC: No depression, anxiety, mood swings, or difficulty sleeping  HEME/LYMPH: No easy bruising, or bleeding gums  ALLERY AND IMMUNOLOGIC: No hives or eczema    Allergies    No Known Allergies    Intolerances        Social History:     FAMILY HISTORY:  Family history of cancer of tongue (Father)  Parents are  . Father - at 80 years, tongue cancer was a smoker. Mother- at 71, had accident while crossing road. Siblings- 2 brothers and one sister.        MEDICATIONS  (STANDING):  amLODIPine   Tablet 5 milliGRAM(s) Oral daily  atorvastatin 80 milliGRAM(s) Oral at bedtime  carvedilol 6.25 milliGRAM(s) Oral every 12 hours  dextrose 40% Gel 15 Gram(s) Oral once  dextrose 5%. 1000 milliLiter(s) (50 mL/Hr) IV Continuous <Continuous>  dextrose 5%. 1000 milliLiter(s) (100 mL/Hr) IV Continuous <Continuous>  dextrose 50% Injectable 25 Gram(s) IV Push once  dextrose 50% Injectable 12.5 Gram(s) IV Push once  dextrose 50% Injectable 25 Gram(s) IV Push once  epoetin cortney-epbx (RETACRIT) Injectable 52257 Unit(s) IV Push <User Schedule>  glucagon  Injectable 1 milliGRAM(s) IntraMuscular once  insulin lispro (ADMELOG) corrective regimen sliding scale   SubCutaneous three times a day before meals  insulin lispro (ADMELOG) corrective regimen sliding scale   SubCutaneous at bedtime  levETIRAcetam 250 milliGRAM(s) Oral two times a day  levothyroxine 50 MICROGram(s) Oral daily  pantoprazole  Injectable 40 milliGRAM(s) IV Push two times a day    MEDICATIONS  (PRN):  acetaminophen     Tablet .. 650 milliGRAM(s) Oral every 6 hours PRN Temp greater or equal to 38C (100.4F), Mild Pain (1 - 3)  melatonin 3 milliGRAM(s) Oral at bedtime PRN Insomnia  ondansetron Injectable 4 milliGRAM(s) IV Push every 8 hours PRN Nausea and/or Vomiting        CAPILLARY BLOOD GLUCOSE      POCT Blood Glucose.: 110 mg/dL (2022 19:26)  POCT Blood Glucose.: 159 mg/dL (2022 07:56)  POCT Blood Glucose.: 259 mg/dL (2022 20:13)    I&O's Summary    2022 07:01  -  2022 20:09  --------------------------------------------------------  IN: 0 mL / OUT: 2000 mL / NET: -2000 mL        PHYSICAL EXAM:  Vital Signs Last 24 Hrs  T(C): 36.8 (2022 18:10), Max: 36.9 (2022 20:53)  T(F): 98.2 (2022 18:10), Max: 98.4 (2022 20:53)  HR: 63 (2022 18:10) (63 - 89)  BP: 167/78 (2022 18:10) (156/75 - 181/74)  BP(mean): --  RR: 18 (2022 18:10) (17 - 18)  SpO2: 95% (2022 18:10) (94% - 98%)    GENERAL: NAD, well-developed  HEAD:  Atraumatic, Normocephalic  EYES: EOMI, PERRLA, conjunctiva and sclera clear  NECK: Supple, No JVD  CHEST/LUNG: Clear to auscultation bilaterally; No wheeze  HEART: Regular rate and rhythm; No murmurs, rubs, or gallops  ABDOMEN: Soft, Nontender, Nondistended; Bowel sounds present  EXTREMITIES:  2+ Peripheral Pulses, No clubbing, cyanosis, or edema  PSYCH: AAOx3  NEUROLOGY: non-focal  SKIN: No rashes or lesions    LABS:                        7.1    8.71  )-----------( 239      ( 2022 06:27 )             23.7     02-04    136  |  98  |  58<H>  ----------------------------<  148<H>  4.8   |  21<L>  |  6.29<H>    Ca    9.2      2022 06:27  Phos  4.6     02-04  Mg     2.3     02-04    TPro  7.6  /  Alb  4.1  /  TBili  0.3  /  DBili  x   /  AST  11  /  ALT  13  /  AlkPhos  161<H>  02-04    PT/INR - ( 2022 18:58 )   PT: 16.7 sec;   INR: 1.41 ratio         PTT - ( 2022 18:58 )  PTT:38.3 sec          RADIOLOGY & ADDITIONAL TESTS:    Imaging Personally Reviewed:    Consultant(s) Notes Reviewed:      Care Discussed with Consultants/Other Providers:  
Patient is a 66y old  Male who presents with a chief complaint of Melena and anemia (2022 10:23)    2022  HPI:  Anemia worsened. PRBC transfusion in progress.  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    Anemia    PEG (percutaneous endoscopic gastrostomy) status  removed 2020    Intubation of airway performed without difficulty  Dec 2019  CVA c/b status epilepticus requiring intubation and PEG in 2019-    ESRD on dialysis  M/W/F    History of insertion of stent into coronary artery bypass graft   and 2019    S/P CABG x 3  off pump C3L on 20        Review of Systems:   CONSTITUTIONAL: No fever, weight loss, or fatigue  EYES: No eye pain, visual disturbances, or discharge  ENMT:  No difficulty hearing, tinnitus, vertigo; No sinus or throat pain  NECK: No pain or stiffness  BREASTS: No pain, masses, or nipple discharge  RESPIRATORY: No cough, wheezing, chills or hemoptysis; No shortness of breath  CARDIOVASCULAR: No chest pain, palpitations, dizziness, or leg swelling  GASTROINTESTINAL: No abdominal or epigastric pain. No nausea, vomiting, or hematemesis; No diarrhea or constipation. No melena or hematochezia.  GENITOURINARY: No dysuria, frequency, hematuria, or incontinence  NEUROLOGICAL: No headaches, memory loss, loss of strength, numbness, or tremors  SKIN: No itching, burning, rashes, or lesions   LYMPH NODES: No enlarged glands  ENDOCRINE: No heat or cold intolerance; No hair loss  MUSCULOSKELETAL: No joint pain or swelling; No muscle, back, or extremity pain  PSYCHIATRIC: No depression, anxiety, mood swings, or difficulty sleeping  HEME/LYMPH: No easy bruising, or bleeding gums  ALLERY AND IMMUNOLOGIC: No hives or eczema    Allergies    No Known Allergies    Intolerances        Social History:     FAMILY HISTORY:  Family history of cancer of tongue (Father)  Parents are  . Father - at 80 years, tongue cancer was a smoker. Mother- at 71, had accident while crossing road. Siblings- 2 brothers and one sister.        MEDICATIONS  (STANDING):  amLODIPine   Tablet 5 milliGRAM(s) Oral daily  atorvastatin 80 milliGRAM(s) Oral at bedtime  carvedilol 6.25 milliGRAM(s) Oral every 12 hours  dextrose 40% Gel 15 Gram(s) Oral once  dextrose 5%. 1000 milliLiter(s) (50 mL/Hr) IV Continuous <Continuous>  dextrose 5%. 1000 milliLiter(s) (100 mL/Hr) IV Continuous <Continuous>  dextrose 50% Injectable 25 Gram(s) IV Push once  dextrose 50% Injectable 12.5 Gram(s) IV Push once  dextrose 50% Injectable 25 Gram(s) IV Push once  epoetin cortney-epbx (RETACRIT) Injectable 80163 Unit(s) IV Push <User Schedule>  glucagon  Injectable 1 milliGRAM(s) IntraMuscular once  insulin lispro (ADMELOG) corrective regimen sliding scale   SubCutaneous three times a day before meals  insulin lispro (ADMELOG) corrective regimen sliding scale   SubCutaneous at bedtime  levETIRAcetam 250 milliGRAM(s) Oral two times a day  levothyroxine 50 MICROGram(s) Oral daily  pantoprazole  Injectable 40 milliGRAM(s) IV Push two times a day    MEDICATIONS  (PRN):  acetaminophen     Tablet .. 650 milliGRAM(s) Oral every 6 hours PRN Temp greater or equal to 38C (100.4F), Mild Pain (1 - 3)  melatonin 3 milliGRAM(s) Oral at bedtime PRN Insomnia  ondansetron Injectable 4 milliGRAM(s) IV Push every 8 hours PRN Nausea and/or Vomiting        CAPILLARY BLOOD GLUCOSE      POCT Blood Glucose.: 110 mg/dL (2022 19:26)  POCT Blood Glucose.: 159 mg/dL (2022 07:56)  POCT Blood Glucose.: 259 mg/dL (2022 20:13)    I&O's Summary    2022 07:01  -  2022 20:09  --------------------------------------------------------  IN: 0 mL / OUT: 2000 mL / NET: -2000 mL        PHYSICAL EXAM:  Vital Signs Last 24 Hrs  T(C): 36.8 (2022 18:10), Max: 36.9 (2022 20:53)  T(F): 98.2 (2022 18:10), Max: 98.4 (2022 20:53)  HR: 63 (2022 18:10) (63 - 89)  BP: 167/78 (2022 18:10) (156/75 - 181/74)  BP(mean): --  RR: 18 (2022 18:10) (17 - 18)  SpO2: 95% (2022 18:10) (94% - 98%)    GENERAL: NAD, well-developed  HEAD:  Atraumatic, Normocephalic  EYES: EOMI, PERRLA, conjunctiva and sclera clear  NECK: Supple, No JVD  CHEST/LUNG: Clear to auscultation bilaterally; No wheeze  HEART: Regular rate and rhythm; No murmurs, rubs, or gallops  ABDOMEN: Soft, Nontender, Nondistended; Bowel sounds present  EXTREMITIES:  2+ Peripheral Pulses, No clubbing, cyanosis, or edema  PSYCH: AAOx3  NEUROLOGY: non-focal  SKIN: No rashes or lesions    LABS:                        7.1    8.71  )-----------( 239      ( 2022 06:27 )             23.7     02-04    136  |  98  |  58<H>  ----------------------------<  148<H>  4.8   |  21<L>  |  6.29<H>    Ca    9.2      2022 06:27  Phos  4.6     02-04  Mg     2.3     02-04    TPro  7.6  /  Alb  4.1  /  TBili  0.3  /  DBili  x   /  AST  11  /  ALT  13  /  AlkPhos  161<H>  02-04    PT/INR - ( 2022 18:58 )   PT: 16.7 sec;   INR: 1.41 ratio         PTT - ( 2022 18:58 )  PTT:38.3 sec          RADIOLOGY & ADDITIONAL TESTS:    Imaging Personally Reviewed:    Consultant(s) Notes Reviewed:      Care Discussed with Consultants/Other Providers:  
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    Anemia    PEG (percutaneous endoscopic gastrostomy) status  removed 2020    Intubation of airway performed without difficulty  Dec 2019  CVA c/b status epilepticus requiring intubation and PEG in 2019-    ESRD on dialysis  M/W/F    History of insertion of stent into coronary artery bypass graft   and 2019    S/P CABG x 3  off pump C3L on 20        Review of Systems:   CONSTITUTIONAL: No fever, weight loss, or fatigue  EYES: No eye pain, visual disturbances, or discharge  ENMT:  No difficulty hearing, tinnitus, vertigo; No sinus or throat pain  NECK: No pain or stiffness  BREASTS: No pain, masses, or nipple discharge  RESPIRATORY: No cough, wheezing, chills or hemoptysis; No shortness of breath  CARDIOVASCULAR: No chest pain, palpitations, dizziness, or leg swelling  GASTROINTESTINAL: No abdominal or epigastric pain. No nausea, vomiting, or hematemesis; No diarrhea or constipation. No melena or hematochezia.  GENITOURINARY: No dysuria, frequency, hematuria, or incontinence  NEUROLOGICAL: No headaches, memory loss, loss of strength, numbness, or tremors  SKIN: No itching, burning, rashes, or lesions   LYMPH NODES: No enlarged glands  ENDOCRINE: No heat or cold intolerance; No hair loss  MUSCULOSKELETAL: No joint pain or swelling; No muscle, back, or extremity pain  PSYCHIATRIC: No depression, anxiety, mood swings, or difficulty sleeping  HEME/LYMPH: No easy bruising, or bleeding gums  ALLERY AND IMMUNOLOGIC: No hives or eczema    Allergies    No Known Allergies    Intolerances        Social History:     FAMILY HISTORY:  Family history of cancer of tongue (Father)  Parents are  . Father - at 80 years, tongue cancer was a smoker. Mother- at 71, had accident while crossing road. Siblings- 2 brothers and one sister.        MEDICATIONS  (STANDING):  amLODIPine   Tablet 5 milliGRAM(s) Oral daily  atorvastatin 80 milliGRAM(s) Oral at bedtime  carvedilol 6.25 milliGRAM(s) Oral every 12 hours  dextrose 40% Gel 15 Gram(s) Oral once  dextrose 5%. 1000 milliLiter(s) (50 mL/Hr) IV Continuous <Continuous>  dextrose 5%. 1000 milliLiter(s) (100 mL/Hr) IV Continuous <Continuous>  dextrose 50% Injectable 25 Gram(s) IV Push once  dextrose 50% Injectable 12.5 Gram(s) IV Push once  dextrose 50% Injectable 25 Gram(s) IV Push once  epoetin cortney-epbx (RETACRIT) Injectable 83933 Unit(s) IV Push <User Schedule>  glucagon  Injectable 1 milliGRAM(s) IntraMuscular once  insulin lispro (ADMELOG) corrective regimen sliding scale   SubCutaneous three times a day before meals  insulin lispro (ADMELOG) corrective regimen sliding scale   SubCutaneous at bedtime  levETIRAcetam 250 milliGRAM(s) Oral two times a day  levothyroxine 50 MICROGram(s) Oral daily  pantoprazole  Injectable 40 milliGRAM(s) IV Push two times a day    MEDICATIONS  (PRN):  acetaminophen     Tablet .. 650 milliGRAM(s) Oral every 6 hours PRN Temp greater or equal to 38C (100.4F), Mild Pain (1 - 3)  melatonin 3 milliGRAM(s) Oral at bedtime PRN Insomnia  ondansetron Injectable 4 milliGRAM(s) IV Push every 8 hours PRN Nausea and/or Vomiting        CAPILLARY BLOOD GLUCOSE      POCT Blood Glucose.: 110 mg/dL (2022 19:26)  POCT Blood Glucose.: 159 mg/dL (2022 07:56)  POCT Blood Glucose.: 259 mg/dL (2022 20:13)    I&O's Summary    2022 07:01  -  2022 20:09  --------------------------------------------------------  IN: 0 mL / OUT: 2000 mL / NET: -2000 mL        PHYSICAL EXAM:  Vital Signs Last 24 Hrs  T(C): 36.8 (2022 18:10), Max: 36.9 (2022 20:53)  T(F): 98.2 (2022 18:10), Max: 98.4 (2022 20:53)  HR: 63 (2022 18:10) (63 - 89)  BP: 167/78 (2022 18:10) (156/75 - 181/74)  BP(mean): --  RR: 18 (2022 18:10) (17 - 18)  SpO2: 95% (2022 18:10) (94% - 98%)    GENERAL: NAD, well-developed  HEAD:  Atraumatic, Normocephalic  EYES: EOMI, PERRLA, conjunctiva and sclera clear  NECK: Supple, No JVD  CHEST/LUNG: Clear to auscultation bilaterally; No wheeze  HEART: Regular rate and rhythm; No murmurs, rubs, or gallops  ABDOMEN: Soft, Nontender, Nondistended; Bowel sounds present  EXTREMITIES:  2+ Peripheral Pulses, No clubbing, cyanosis, or edema  PSYCH: AAOx3  NEUROLOGY: non-focal  SKIN: No rashes or lesions    LABS:                        7.1    8.71  )-----------( 239      ( 2022 06:27 )             23.7     02-04    136  |  98  |  58<H>  ----------------------------<  148<H>  4.8   |  21<L>  |  6.29<H>    Ca    9.2      2022 06:27  Phos  4.6     02-04  Mg     2.3     02-04    TPro  7.6  /  Alb  4.1  /  TBili  0.3  /  DBili  x   /  AST  11  /  ALT  13  /  AlkPhos  161<H>  02-04    PT/INR - ( 2022 18:58 )   PT: 16.7 sec;   INR: 1.41 ratio         PTT - ( 2022 18:58 )  PTT:38.3 sec          RADIOLOGY & ADDITIONAL TESTS:    Imaging Personally Reviewed:    Consultant(s) Notes Reviewed:      Care Discussed with Consultants/Other Providers:  
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    Anemia    PEG (percutaneous endoscopic gastrostomy) status  removed 2020    Intubation of airway performed without difficulty  Dec 2019  CVA c/b status epilepticus requiring intubation and PEG in 2019-    ESRD on dialysis  M/W/F    History of insertion of stent into coronary artery bypass graft   and 2019    S/P CABG x 3  off pump C3L on 20        Review of Systems:   CONSTITUTIONAL: No fever, weight loss, or fatigue  EYES: No eye pain, visual disturbances, or discharge  ENMT:  No difficulty hearing, tinnitus, vertigo; No sinus or throat pain  NECK: No pain or stiffness  BREASTS: No pain, masses, or nipple discharge  RESPIRATORY: No cough, wheezing, chills or hemoptysis; No shortness of breath  CARDIOVASCULAR: No chest pain, palpitations, dizziness, or leg swelling  GASTROINTESTINAL: No abdominal or epigastric pain. No nausea, vomiting, or hematemesis; No diarrhea or constipation. No melena or hematochezia.  GENITOURINARY: No dysuria, frequency, hematuria, or incontinence  NEUROLOGICAL: No headaches, memory loss, loss of strength, numbness, or tremors  SKIN: No itching, burning, rashes, or lesions   LYMPH NODES: No enlarged glands  ENDOCRINE: No heat or cold intolerance; No hair loss  MUSCULOSKELETAL: No joint pain or swelling; No muscle, back, or extremity pain  PSYCHIATRIC: No depression, anxiety, mood swings, or difficulty sleeping  HEME/LYMPH: No easy bruising, or bleeding gums  ALLERY AND IMMUNOLOGIC: No hives or eczema    Allergies    No Known Allergies    Intolerances        Social History:     FAMILY HISTORY:  Family history of cancer of tongue (Father)  Parents are  . Father - at 80 years, tongue cancer was a smoker. Mother- at 71, had accident while crossing road. Siblings- 2 brothers and one sister.        MEDICATIONS  (STANDING):  amLODIPine   Tablet 5 milliGRAM(s) Oral daily  atorvastatin 80 milliGRAM(s) Oral at bedtime  carvedilol 6.25 milliGRAM(s) Oral every 12 hours  dextrose 40% Gel 15 Gram(s) Oral once  dextrose 5%. 1000 milliLiter(s) (50 mL/Hr) IV Continuous <Continuous>  dextrose 5%. 1000 milliLiter(s) (100 mL/Hr) IV Continuous <Continuous>  dextrose 50% Injectable 25 Gram(s) IV Push once  dextrose 50% Injectable 12.5 Gram(s) IV Push once  dextrose 50% Injectable 25 Gram(s) IV Push once  epoetin cortney-epbx (RETACRIT) Injectable 86393 Unit(s) IV Push <User Schedule>  glucagon  Injectable 1 milliGRAM(s) IntraMuscular once  insulin lispro (ADMELOG) corrective regimen sliding scale   SubCutaneous three times a day before meals  insulin lispro (ADMELOG) corrective regimen sliding scale   SubCutaneous at bedtime  levETIRAcetam 250 milliGRAM(s) Oral two times a day  levothyroxine 50 MICROGram(s) Oral daily  pantoprazole  Injectable 40 milliGRAM(s) IV Push two times a day    MEDICATIONS  (PRN):  acetaminophen     Tablet .. 650 milliGRAM(s) Oral every 6 hours PRN Temp greater or equal to 38C (100.4F), Mild Pain (1 - 3)  melatonin 3 milliGRAM(s) Oral at bedtime PRN Insomnia  ondansetron Injectable 4 milliGRAM(s) IV Push every 8 hours PRN Nausea and/or Vomiting        CAPILLARY BLOOD GLUCOSE      POCT Blood Glucose.: 110 mg/dL (2022 19:26)  POCT Blood Glucose.: 159 mg/dL (2022 07:56)  POCT Blood Glucose.: 259 mg/dL (2022 20:13)    I&O's Summary    2022 07:01  -  2022 20:09  --------------------------------------------------------  IN: 0 mL / OUT: 2000 mL / NET: -2000 mL        PHYSICAL EXAM:  Vital Signs Last 24 Hrs  T(C): 36.8 (2022 18:10), Max: 36.9 (2022 20:53)  T(F): 98.2 (2022 18:10), Max: 98.4 (2022 20:53)  HR: 63 (2022 18:10) (63 - 89)  BP: 167/78 (2022 18:10) (156/75 - 181/74)  BP(mean): --  RR: 18 (2022 18:10) (17 - 18)  SpO2: 95% (2022 18:10) (94% - 98%)    GENERAL: NAD, well-developed  HEAD:  Atraumatic, Normocephalic  EYES: EOMI, PERRLA, conjunctiva and sclera clear  NECK: Supple, No JVD  CHEST/LUNG: Clear to auscultation bilaterally; No wheeze  HEART: Regular rate and rhythm; No murmurs, rubs, or gallops  ABDOMEN: Soft, Nontender, Nondistended; Bowel sounds present  EXTREMITIES:  2+ Peripheral Pulses, No clubbing, cyanosis, or edema  PSYCH: AAOx3  NEUROLOGY: non-focal  SKIN: No rashes or lesions    LABS:                        7.1    8.71  )-----------( 239      ( 2022 06:27 )             23.7     02-04    136  |  98  |  58<H>  ----------------------------<  148<H>  4.8   |  21<L>  |  6.29<H>    Ca    9.2      2022 06:27  Phos  4.6     02-04  Mg     2.3     02-04    TPro  7.6  /  Alb  4.1  /  TBili  0.3  /  DBili  x   /  AST  11  /  ALT  13  /  AlkPhos  161<H>  02-04    PT/INR - ( 2022 18:58 )   PT: 16.7 sec;   INR: 1.41 ratio         PTT - ( 2022 18:58 )  PTT:38.3 sec          RADIOLOGY & ADDITIONAL TESTS:    Imaging Personally Reviewed:    Consultant(s) Notes Reviewed:      Care Discussed with Consultants/Other Providers:

## 2022-02-12 NOTE — DISCHARGE NOTE PROVIDER - CARE PROVIDER_API CALL
Ifrah Langston)  Gastroenterology; Internal Medicine  [address] [place] [address] [address]  [place], [place] [id number]  Phone: [phone]  Fax: [phone]  Follow Up Time: 2 weeks

## 2022-02-12 NOTE — DISCHARGE NOTE PROVIDER - NSDCCPCAREPLAN_GEN_ALL_CORE_FT
PRINCIPAL DISCHARGE DIAGNOSIS  Diagnosis: Anemia due to acute blood loss  Assessment and Plan of Treatment: You came in with dark stools and Acute blood loss Anemia  Upper endoscopy shows non bleeding gastric and duodenal ulcers, no active bleeding at this time. These were the reason for acute blood loss anemia.  Patient tolerated the resumption of AC - home Eliquis resumed  CBC is stable  Hold Aspirin until cleared by Gastroenterologist  Started PPI QD and Carafate 1g PO BID   Patient to follow up with Gastroenterologist in 2 weeks  You will need repeat EGD in 8 weeks to reassess and a colonoscopy at that time given hx of polyps         SECONDARY DISCHARGE DIAGNOSES  Diagnosis: ESRD on dialysis  Assessment and Plan of Treatment: Continue Hemodialysis as scheduled Mon Wed Friday      Diagnosis: Paroxysmal atrial fibrillation  Assessment and Plan of Treatment: Restarted pt on Eliquis and pt tolerated  -Continue Coreg   Call your doctor if you feel your heart racing or beating unusually, chest tightness or pain, lightheaded, faint, shortness of breath especially with exercise  It is important to take your heart medication as prescribed  You may be on anticoagulation which is very important to take as directed - you may need blood work to monitor drug levels      Diagnosis: CAD (coronary artery disease)  Assessment and Plan of Treatment: S/p CABG 2020  -Hold Aspirin until cleared by gastroenterology  -Continue coreg, cholesterol medicine    Diagnosis: Diabetes mellitus  Assessment and Plan of Treatment: Continue Fingersticks and insulin sliding scale.    Diagnosis: Cerebrovascular accident (CVA)  Assessment and Plan of Treatment: History of stroke   -Continue keppra 250mg BID for seizure prophylaxis    Diagnosis: Essential hypertension  Assessment and Plan of Treatment: Continue Amlodipine and coreg as ordered

## 2022-02-12 NOTE — CHART NOTE - NSCHARTNOTEFT_GEN_A_CORE
Pt's H/H improved - 8.5/27 on Eliquis  Request from Dr. Flores to facilitate patient discharge. Medication reconciliation reviewed, revised, and resolved with Dr. Flores who had medically cleared patient for discharge with follow-up as advised. Please refer to discharge note for detailed hospital course. Patient is currently stable for discharge to home with outpt PT (family refused home PT) at this time.  Discharge discussed with son and does not want prescription for amlodipine and coreg (has them at home)     Contact # [phone]

## 2022-03-09 NOTE — ED PROVIDER NOTE - CLINICAL SUMMARY MEDICAL DECISION MAKING FREE TEXT BOX
65 y/o male w/ laceration to L foot- wound explored, no FB, laceration repair, tdap, will send prophylactic abx as pt is diabetic and on dialysis. dc

## 2022-03-09 NOTE — ED ADULT NURSE NOTE - CHIEF COMPLAINT QUOTE
As per Son..."He was in bathroom dropped a glass and stepped on it...he is on Eloquis. " Pt st" I did not fall or hit head...." + Dressing on left foot, bleeding appears constrolled. ...DM2, Diaylsis(MWF)..left CW port. Siezures . Tetanus status unk

## 2022-03-09 NOTE — ED PROVIDER NOTE - NSFOLLOWUPINSTRUCTIONS_ED_ALL_ED_FT
Please keep wound covered for 24 hours  Clean with soap and water, cover with bacitracin and wrap wound in sterile dressing.  Leave wound covered daily until stitches are removed  Please return to the ER in 7 days for suture removal.

## 2022-03-09 NOTE — ED ADULT NURSE NOTE - OBJECTIVE STATEMENT
Pt received in intake room 9. Pt A&Ox4 (William speaking, Son Suchet at bedside to interpret), Pmhx: Afib on Eliquis, DM2, CVA, ESRD(dialysis m/w/f), coming to ED for laceration to bottom of left foot. Per son, pt was in bathroom, dropped a glass and stepped on it, denies any fall or head trauma. Son who is a physician states he controlled bleeding at home immediately with pressure. Pt on Eliquis. Pt has 2cm lac to bottom of left foot, bleeding controlled via 4X4 dressing. Arrives with left chest wall port for dialysis. Denies chest pain, sob, abd pain, ha, dizziness, n/v/d, fevers/chills. Respirations even and unlabored, no accessory muscle use. Awaiting MD orders. Stretcher in lowest position, side rail up, call bell within reach.

## 2022-03-09 NOTE — ED PROVIDER NOTE - OBJECTIVE STATEMENT
67 y/o male pmh dm, esrd on HD, MWF, CVA on eliquis c/o foot laceration. Pt was brushing his teeth this morning and dropped a glass when rinsing his mouth. Pt stepped on the broken glass and cut his foot. Pts son applied pressure but could not control bleeding leading to ER visit. Denies fall or head trauma. Denies any other complaints. tetanus status not up to date.

## 2022-03-10 NOTE — ED ADULT NURSE NOTE - DOES PATIENT HAVE ADVANCE DIRECTIVE
I have reviewed discharge and prescription instructions with the patient. The patient verbalized understanding. Denies pain, remains alert and ambulatory. No acute distress noted. Needs met.
Yes

## 2022-03-21 NOTE — PATIENT PROFILE ADULT - FUNCTIONAL SCREEN CURRENT LEVEL: SWALLOWING (IF SCORE 2 OR MORE FOR ANY ITEM, CONSULT REHAB SERVICES), MLM)
Render Path Notes In Note?: No
Post-Care Instructions: I reviewed with the patient in detail post-care instructions. Patient is to keep the biopsy site dry overnight, and then apply bacitracin twice daily until healed. Patient may apply hydrogen peroxide soaks to remove any crusting.
Electrodesiccation And Curettage Text: The wound bed was treated with electrodesiccation and curettage after the biopsy was performed.
Hemostasis: Drysol
Information: Selecting Yes will display possible errors in your note based on the variables you have selected. This validation is only offered as a suggestion for you. PLEASE NOTE THAT THE VALIDATION TEXT WILL BE REMOVED WHEN YOU FINALIZE YOUR NOTE. IF YOU WANT TO FAX A PRELIMINARY NOTE YOU WILL NEED TO TOGGLE THIS TO 'NO' IF YOU DO NOT WANT IT IN YOUR FAXED NOTE.
Size Of Lesion In Cm: 0
Detail Level: Detailed
Biopsy Method: Dermablade
Cryotherapy Text: The wound bed was treated with cryotherapy after the biopsy was performed.
Anesthesia Type: 1% lidocaine without epinephrine
Dressing: bandage
Was A Bandage Applied: Yes
Notification Instructions: Patient will be notified of biopsy results. However, patient instructed to call the office if not contacted within 2 weeks.
Silver Nitrate Text: The wound bed was treated with silver nitrate after the biopsy was performed.
Biopsy Type: H and E
Electrodesiccation Text: The wound bed was treated with electrodesiccation after the biopsy was performed.
Type Of Destruction Used: Curettage
Wound Care: Petrolatum
Billing Type: Third-Party Bill
Curettage Text: The wound bed was treated with curettage after the biopsy was performed.
Depth Of Biopsy: dermis
0 = swallows foods/liquids without difficulty
Anesthesia Volume In Cc: 1
Consent: Written consent was obtained and risks were reviewed including but not limited to scarring, infection, bleeding, scabbing, incomplete removal, nerve damage and allergy to anesthesia.

## 2022-03-23 NOTE — DISCHARGE NOTE NURSING/CASE MANAGEMENT/SOCIAL WORK - NSTRANSFEREYEGLASSESPAIRS_GEN_A_NUR
What Type Of Note Output Would You Prefer (Optional)?: Bullet Format How Severe Is Your Skin Lesion?: mild Has Your Skin Lesion Been Treated?: not been treated Is This A New Presentation, Or A Follow-Up?: Growth 1 pair

## 2022-03-25 PROBLEM — D50.0 BLOOD LOSS ANEMIA: Status: ACTIVE | Noted: 2022-01-01

## 2022-03-25 PROBLEM — K27.9 PEPTIC ULCER DISEASE: Status: ACTIVE | Noted: 2022-01-01

## 2022-03-25 NOTE — PHYSICAL EXAM
[Sclera] : the sclera and conjunctiva were normal [Outer Ear] : the ears and nose were normal in appearance [Neck Appearance] : the appearance of the neck was normal [FreeTextEntry1] : irregularly irregular [Bowel Sounds] : normal bowel sounds [Abdomen Soft] : soft [Abdomen Tenderness] : non-tender [Abdomen Mass (___ Cm)] : no abdominal mass palpated [Involuntary Movements] : no involuntary movements were seen [Skin Color & Pigmentation] : normal skin color and pigmentation [Skin Turgor] : normal skin turgor [] : no rash [No Focal Deficits] : no focal deficits [Oriented To Time, Place, And Person] : oriented to person, place, and time [Impaired Insight] : insight and judgment were intact [Affect] : the affect was normal

## 2022-03-25 NOTE — HISTORY OF PRESENT ILLNESS
[de-identified] : CHAD DUNBAR is a 66 year old male with a history of ESRD on HD pending renal tx, CAD s/p CABG, Afib on Eliquis, T2DM, CVA with residual weakness\par \par He is presenting today for hospital followup for GI Bleeding\par He was recently at [facility] for blood loss anemia\par Underwent upper endoscopy by Dr. Witt which revealed multiple non-bleeding gastric and duodenal ulcers\par Path negative for HP or IM\par \par Since discharge Hgb has been relatively stable\par He is on Eliquis but not ASA or Plavix \par Taking once daily PPI, finished course of sucralfate\par Stools brown without red blood or melena\par Does endorse constipation - takes PRN senna, colace, miralax given by son\par \par GI notes in hospital noted history of colon polyps as well\par \par \par \par \par \par

## 2022-04-06 NOTE — ED ADULT TRIAGE NOTE - CHIEF COMPLAINT QUOTE
C/o witnessed fall at 630PM, +head injury, pt on Eliquis. Per family member, pt has been confused, slower to respond since 12PM afternoon prior to fall. Pt has a PermCath on left upper chest wall, finished dialysis today. HTN. afib, CKD, HTN, HLD, CVA with right-side deficit

## 2022-04-06 NOTE — ED PROVIDER NOTE - OBJECTIVE STATEMENT
67 y/o M w/ pmhx of 65 y/o M w/ pmhx of HTN. ckd, afib on eqliuis, HTN, HLD, CVA w/ residual R sided deficit presents due to witnessed fall that occurred at 630pm today. Family states pt has been confused since noon today. Received HD today.

## 2022-04-06 NOTE — ED ADULT NURSE NOTE - OBJECTIVE STATEMENT
67yo male received in room 29. pt A&Ox4, William speaking, hx of afib on Eliquis, ESRD dialysis MWF through PermCath left chest (last dialysis today, HTN, HLD, stroke with right side residual, c/o slip and fall today noon, striking head, no active bleeding noted. bump noted on back of head. as per family, pt has been slower in responding to questions. pt A&Ox4 at time of assessment. respiration even and non-labored. in nad. 20G IV placed in LAC, lab drawn and sent. MD cabrera in progress. awaiting for CT.

## 2022-04-06 NOTE — ED PROVIDER NOTE - NSFOLLOWUPINSTRUCTIONS_ED_ALL_ED_FT
(1) Follow up with your primary care physician as discussed.   (2) Immediately seek care at your nearest emergency room if your symptoms worsen, persist, or do not resolve   (3) Take Tylenol as needed for pain per the dosing instructions on the bottle.   (4) Take the prescribed medication as instructed:      Cellulitis    WHAT YOU NEED TO KNOW:    Cellulitis is a skin infection caused by bacteria. Cellulitis is common and can become severe. Cellulitis usually appears on the lower legs. It can also appear on the arms, face, and other areas. Cellulitis develops when bacteria enter a crack or break in your skin, such as a scratch, bite, or cut.    DISCHARGE INSTRUCTIONS:    Return to the emergency department if:   •Your wound gets larger and more painful.  •You feel a crackling under your skin when you touch it.  •You have purple dots or bumps on your skin, or you see bleeding under your skin.  •You see red streaks coming from the infected area.    Call your doctor if:   •The red, warm, swollen area gets larger.  •Your fever or pain does not go away or gets worse.  •The area does not get smaller after 3 days of antibiotics.  •You have questions or concerns about your condition or care.

## 2022-04-06 NOTE — ED PROVIDER NOTE - PROGRESS NOTE DETAILS
O'Arlet DO PGY-2: reassessed pt w/ son at bedside (who is cardio fellow). Son states pt fellow due to recent ankle sprain on L side. On exam, L ankle swollen and L calf is warm and erythematous. ordered additional XR and DVT study. Pt also endorsing R shoulder pain 2/2 fall O'Arlet DO PGY-2: reassessed pt w/ son at bedside (who is cardio fellow). Son states pt fellow due to recent ankle sprain on L side. On exam, L ankle swollen and L calf is warm and erythematous. ordered additional XR and DVT study. Pt also endorsing R shoulder pain 2/2 fall. Son states pt has not had any fevers/chills in the past 2 days. Rach DO PGY-2: will give abx for cellulitis (clinically) as LLE is warm and erythematous. Son who is cardio fellow states he wants to take his dad home and him just get PO abx. Will send bactrim to cover for MRSA. Will yaquelin. Discussed return precautions.

## 2022-04-06 NOTE — ED PROVIDER NOTE - ATTENDING CONTRIBUTION TO CARE
The patient is a 66y Male who has a past medical and surgery history of  HTN, DM2 AF/Eliquis, HLD, CAD/stents/CABGx3 8/20  CVA c/b status epilepticus requiring ETT; recovery w/ residual R sided deficit, hypothyroidism  Anemia prior PEG ESRD/HD MWF last today 6hr PTA   PTED with son who is a physician 2/2 a witnessed fall that occurred at 630pm today and worsening confusion   Vital Signs Last 24 Hrs  T(F): 98.2 HR: 62 BP: 181/72 RR: 18 SpO2: 100% (07 Apr 2022 04:32)   PE: as described; my additions and exceptions are noted in the chart    DATA:  EKG: pending at time of evaluation  LAB: Pending at time of evaluation     IMPRESSION/RISK:  Dx=  at least moderate risk for bleed less likely electrolyte as pt has been on HD for 2 years ? fall red herring for precipitant such as infecton etc   Plan  stat head CT/neck  Pt confuse Ativan/haloperidol  prn; consider ativan up front in case of NCSE  reassess  dispo as per results and response

## 2022-04-06 NOTE — ED PROVIDER NOTE - CLINICAL SUMMARY MEDICAL DECISION MAKING FREE TEXT BOX
O'Arlet DO PGY-2: pt w/ pmhx of HTN. afib, esrd, HTN, HLD, CVA presents due to fall on eliquis. Will eval for head bleed given the AC. Ordered labs, meds, imaging. Will reassess. Dispo pending workup.

## 2022-04-06 NOTE — ED ADULT NURSE NOTE - NSFALLRSKASSESSTYPE_ED_ALL_ED
LOV: 1/27/2020 MAW  Next appt: 4/27/2020    Flomax  Last refill: 7/26/19 180 caps    Please refill if appropriate. Thank you.
Initial (On Arrival)

## 2022-04-06 NOTE — ED PROVIDER NOTE - PATIENT PORTAL LINK FT
You can access the FollowMyHealth Patient Portal offered by [facility] by registering at the following website: [email or web address]. By joining [facility]s FollowMyHealth portal, you will also be able to view your health information using other applications (apps) compatible with our system.

## 2022-04-06 NOTE — ED PROVIDER NOTE - PHYSICAL EXAMINATION
CONSTITUTIONAL: Well-developed; well-nourished  SKIN: warm, dry  HEAD: Normocephalic  EYES: no conjunctival injection. PERRL.   ENT: No nasal discharge; airway clear.  NECK: Supple; non tender.  CARD: S1, S2 normal; no murmurs, gallops, or rubs. Regular rate and rhythm.   RESP: No wheezes, rales or rhonchi. Good air movement bilaterally.   ABD: soft ntnd, no guarding, no distention, no rigidity.   EXT: No cyanosis or edema.   NEURO: agitated

## 2022-04-06 NOTE — ED PROVIDER NOTE - NS ED ROS FT
CONSTITUTIONAL - No fever, No diaphoresis, No weight change  EYES - No eye pain, No blurred vision  ENT - No change in hearing, No sore throat, No neck pain, No rhinorrhea, No ear pain  RESPIRATORY - No shortness of breath, No cough  CARDIAC -No chest pain, No palpitations  GI - No abdominal pain, No nausea, No vomiting, No diarrhea, No constipation  - No dysuria, no frequency, no hematuria.   MUSCULOSKELETAL - No joint pain, No swelling, No back pain  NEUROLOGIC - No numbness, No focal weakness, +headache, No dizziness

## 2022-04-06 NOTE — ED ADULT TRIAGE NOTE - HEART RATE (BEATS/MIN)
Pt & family received dc instructions, piv dcd, neph tube intact and draining, pt left via WC with escort & family in NAD  
MD Doherty verbalized pt may be dcd home   
73

## 2022-04-11 NOTE — DISCHARGE NOTE NURSING/CASE MANAGEMENT/SOCIAL WORK - NSDCPEFALRISK_GEN_ALL_CORE
Better with Creon, continue  Appreciate GI input     Patient information on fall and injury prevention

## 2022-04-12 NOTE — H&P PST ADULT - NSICDXPASTMEDICALHX_GEN_ALL_CORE_FT
PAST MEDICAL HISTORY:  Anemia     CAD (Coronary Artery Disease) with Stents in 06/2009 and 6/2019, s/p off-pump C3L on 8/13/20    CVA (cerebral vascular accident) 12/13/19 with residual bilateral weakness    Diabetes     Dyslipidemia     ESRD on dialysis M/W/F    Hypertension     Hypothyroidism     Intubation of airway performed without difficulty Dec 2019  CVA c/b status epilepticus requiring intubation and PEG in 12/2019-    PEG (percutaneous endoscopic gastrostomy) status removed July 2020    Seizures after CVA, last seizure was last week 4/07/22

## 2022-04-12 NOTE — H&P PST ADULT - HISTORY OF PRESENT ILLNESS
This is a 67 y/o male PMH ESRD on transplant list, A fib on eliquis, CAD, S/P CABG X 3 2020, previous PCI with stents 2019, CVA 12/2021 with residual right sided weakness, GIB 10 years ago and in february, no active bleeding ulcers noted as per son.  Presents today for follow up endoscopy. This is a 65 y/o male PMH ESRD on transplant list, currently is being dialyzed via permacath on Monday, Wednesday and Friday at [place], paroxysmal A fib on eliquis, CAD, S/P CABG X 3 2020, previous PCI with stents 2019, CVA 12/2021 with residual right sided weakness, seizures post CVA, GIB 10 years ago and again in February 2022, no active bleeding ulcers noted as per son.  Presents today for follow up endoscopy.  Patient's son reports that his father had a seizure last week, was seen in the ER with head laceration and witnessed fall, also treated for cellulitis of the leg, neurologist added valproic acid to his keppra regimen. This is a 67 y/o male PMH ESRD on transplant list, currently is being dialyzed via permacath on Monday, Wednesday and Friday at [place], paroxysmal A fib on eliquis, CAD, S/P CABG X 3 2020, previous PCI with stents 2019, CVA 12/2021 with residual right sided weakness, seizures post CVA, GIB 10 years ago and again in February 2022, no active bleeding ulcers noted as per son.  Presents today for follow up endoscopy.  Patient's son reports that his father was ill last week with flu-like symptoms; however, was negative for COVID, had a seizure in the setting of illness, was seen in the ER with head laceration and witnessed fall, also treated for cellulitis of the leg, neurologist added valproic acid to his keppra regimen.    COVID swab 4/15/22 This is a 65 y/o male PMH ESRD on transplant list, currently is being dialyzed via permacath on Monday, Wednesday and Friday at [place], paroxysmal A fib on eliquis, CAD, S/P CABG X 3 2020, previous PCI with stents 2019, CVA 12/2021 with residual right sided weakness, seizures post CVA, GIB 10 years ago and again in February 2022, no active bleeding ulcers noted as per son.  Presents today for follow up endoscopy.  Patient's son reports that his father had a seizure last week, was seen in the ER with head laceration and witnessed fall, also treated for cellulitis of the leg, according to notes, neurologist added valproic acid to his keppra regimen, as per son.    COVID swab 4/15/22 This is a 65 y/o male PMH ESRD on transplant list, currently is being dialyzed via permacath on Monday, Wednesday and Friday at [place], paroxysmal A fib on eliquis, CAD, S/P CABG X 3 2020, previous PCI with stents 2019, CVA 12/2021 with residual right sided weakness, seizures post CVA, GIB 10 years ago and again in February 2022, no active bleeding ulcers noted as per son.  Presents today for follow up endoscopy.  Patient's son reports that his father had a seizure last week, was seen in the ER with head laceration and witnessed fall, also treated for cellulitis of the leg, according to ED notes, now resolved.  Neurologist added valproic acid to his keppra regimen, as per son.    COVID swab 4/15/22

## 2022-04-12 NOTE — H&P PST ADULT - PROBLEM SELECTOR PLAN 4
Aspirin on hold due to GIB Aspirin on hold due to GIB.  Patient was to F/U with Dr. Bose last week, due to seizure, had to cancel appointment.  As per son, has been stable, son is also a cardiologist.

## 2022-04-15 PROBLEM — R56.9 UNSPECIFIED CONVULSIONS: Chronic | Status: ACTIVE | Noted: 2022-01-01

## 2022-04-19 NOTE — ASU DISCHARGE PLAN (ADULT/PEDIATRIC) - ASU DC SPECIAL INSTRUCTIONSFT
Follow up with Dr. Langston as needed  Continue daily PPI while taking aspirin  OK to resume AC from GI standpoint

## 2022-04-19 NOTE — ASU DISCHARGE PLAN (ADULT/PEDIATRIC) - FOLLOW UP APPOINTMENTS
Writer left message on mother's voice mail with information below.   [facility], Endoscopy Unit

## 2022-04-19 NOTE — ASU DISCHARGE PLAN (ADULT/PEDIATRIC) - NS MD DC FALL RISK RISK
For information on Fall & Injury Prevention, visit: [email or web address] OR  [email or web address] OR  https://www.cdc.gov/steadi/patient.html

## 2022-04-19 NOTE — ASU PATIENT PROFILE, ADULT - FALL HARM RISK - ATTEMPT OOB
"Patient stated that she's having a "sickle cell crisis" left leg is locking up and unable to walk at this time. Needs assistance. Pain 9/10 left leg 8/10 pain for generalized body pain. Urgent Care " gave me a shot" with little relief  " No

## 2022-04-19 NOTE — PRE PROCEDURE NOTE - PRE PROCEDURE EVALUATION
Attending Physician:  Kian                          Procedure: EGD    Indication for Procedure: Gastric ulcer surveillance / follow up  ________________________________________________________  PAST MEDICAL & SURGICAL HISTORY:  Hypertension    Diabetes    Dyslipidemia    CAD (Coronary Artery Disease)  with Stents in 06/2009 and 6/2019, s/p off-pump C3L on 8/13/20    Hypothyroidism    CVA (cerebral vascular accident)  12/13/19 with residual bilateral weakness    Anemia    PEG (percutaneous endoscopic gastrostomy) status  removed July 2020    Intubation of airway performed without difficulty  Dec 2019  CVA c/b status epilepticus requiring intubation and PEG in 12/2019-    ESRD on dialysis  M/W/F    Seizures  after CVA, last seizure was last week 4/07/22    History of insertion of stent into coronary artery bypass graft  2009 and 2019    S/P CABG x 3  off pump C3L on 8/13/20      ALLERGIES:  No Known Allergies    HOME MEDICATIONS:  amLODIPine 5 mg oral tablet: 1 tab(s) orally once a day  apixaban 2.5 mg oral tablet: 1 tab(s) orally 2 times a day  atorvastatin 80 mg oral tablet: 1 tab(s) orally once a day (at bedtime)  Coreg 6.25 mg oral tablet: 1 tab(s) orally 2 times a day  Depakote 250 mg oral delayed release tablet: 1 tab(s) orally 1 times a day  Depakote 500 mg oral delayed release tablet: 1 tab(s) orally 2 times a day  hydrALAZINE 10 mg oral tablet: orally 3 times a day  insulin lispro 100 units/mL injectable solution: 2 Unit(s) if Glucose 151 - 200  4 Unit(s) if Glucose 201 - 250  6 Unit(s) if Glucose 251 - 300  8 Unit(s) if Glucose 301 - 350  10 Unit(s) if Glucose 351 - 400  12 Unit(s) if Glucose Greater Than 400  levETIRAcetam 250 mg oral tablet: 1 tab(s) orally 2 times a day and an extra dose of 125mg dialysis on dialysis days  Protonix 40 mg oral delayed release tablet: 1 tab(s) orally once a day  Retacrit 10,000 units/mL injectable solution: injectable 3 times a week INTRADIALYSIS  sevelamer hydrochloride 800 mg oral tablet: 1 tab(s) orally 3 times a day  telmisartan 80 mg oral tablet: 1 tab(s) orally once a day    AICD/PPM: [ ] yes   [ ] no    PERTINENT LAB DATA:                      PHYSICAL EXAMINATION:    Height (cm): 177.8  Weight (kg): 62.3  BMI (kg/m2): 19.7  BSA (m2): 1.78T(C): 36.1  HR: 62  BP: 187/73  RR: 15  SpO2: 98%    Constitutional: NAD  HEENT: PERRLA, EOMI,    Neck:  No JVD  Respiratory: CTAB/L  Cardiovascular: S1 and S2  Gastrointestinal: BS+, soft, NT/ND  Extremities: No peripheral edema  Neurological: A/O x 3, no focal deficits  Psychiatric: Normal mood, normal affect  Skin: No rashes    ASA Class: I [ ]  II [ ]  III [ ]  IV [ ]    COMMENTS:    The patient is a suitable candidate for the planned procedure unless box checked [ ]  No, explain:

## 2022-04-19 NOTE — ASU PATIENT PROFILE, ADULT - FALL HARM RISK - HARM RISK INTERVENTIONS

## 2022-04-21 NOTE — PRE-ANESTHESIA EVALUATION ADULT - NSANTHBMIRD_ENT_A_CORE
"You have tested POSITIVE for COVID-19 today    Quarantine recommendations based on CDC guidelines:      No symptoms present: You must quarantine for 5 days starting on the day of the positive test.    Symptoms present: You must quarantine for 5 days starting on the day of symptom onset.      AFTER 5 days, if your symptoms have improved and you are fever free, you can return to the community on day 6.  The CDC recommends strict mask use for 5 days following quarantine end.      You cannot test negative to "test out" of quarantine. Per CDC recommendations we do not retest within 90 days of a positive test.      Please refer to CDC website for additional information  " No

## 2022-04-21 NOTE — PRE-ANESTHESIA EVALUATION ADULT - MALLAMPATI CLASS
Class II - visualization of the soft palate, fauces, and uvula Pounds Preamble Statement (Weight Entered In Details Tab): Reported Weight in pounds:

## 2022-04-21 NOTE — H&P ADULT - HISTORY OF PRESENT ILLNESS
This is a 66 year old male with history of HTN, HLD, DM (on insulin), ESRD (2/2 DM and HTN) on HD via permacath since 3yrs ago MWF, afib (on eliquis 2.5mg BID), hx of stroke (20192 afib), focal seizure (on depakote, keppra), hx of CABG (), hx of nonbleeding gastric/duodenal ulcer (EGD 22, on protonix) presents for potential DDRT.     Makes minimal urine  Last HD: Yesterday  (nephrologist, Dr. Barrientos)  NPO since: 9AM     Recent testin22: ECG: Sinus Rhythm WDL (cardiologist Dr. Bose)
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
## 2022-04-21 NOTE — H&P ADULT - NSHPPHYSICALEXAM_GEN_ALL_CORE
PHYSICAL EXAM:  GENERAL: NAD  HEENT:  NC/AT, EOMI, moist mucous membranes  CHEST/LUNG:  CTA b/l, no rales, wheezes, or rhonchi  HEART:  RRR, S1, S2  ABDOMEN:  BS+, soft, nontender, nondistended  EXTREMITIES: no edema, cyanosis, or calf tenderness. LAVF w/ palpable thrill.   NERVOUS SYSTEM: AA&Ox3

## 2022-04-21 NOTE — H&P ADULT - NSHPREVIEWOFSYSTEMS_GEN_ALL_CORE
REVIEW OF SYSTEMS:  CONSTITUTIONAL: No fever or chills  HEENT:  No headache, no sore throat  RESPIRATORY: No cough, wheezing, or shortness of breath  CARDIOVASCULAR: No chest pain, palpitations, or leg swelling  GASTROINTESTINAL: No abd pain, nausea, vomiting, or diarrhea  GENITOURINARY: No dysuria, frequency, or hematuria  NEUROLOGICAL: no focal weakness or dizziness  MUSCULOSKELETAL: no myalgias

## 2022-04-21 NOTE — PRE-ANESTHESIA EVALUATION ADULT - NSANTHPMHFT_GEN_ALL_CORE
66M with PMH of HTN, HLD, CAD (s/p stents in 2009) and CABG in 2020 (most recent echo showed EF of 40-45%, mild-mod MR), ESRD (on dialysis, last yesterday), DM on insulin, CVA with residual right sided weakness, seizure after CVA, last seizure was 04/07 now on depokote and keppra), here for kidney transplant with donor kidney. Currently patient denies any CP, SOB, gerd like symptoms.

## 2022-04-21 NOTE — H&P ADULT - ASSESSMENT
This is a 66 year old male with history of HTN, HLD, DM (on insulin), ESRD (2/2 DM and HTN) on HD via permacath since 3yrs ago MWF, afib (on eliquis 2.5mg BID), hx of stroke (2019 2/2 afib), focal seizure (on depakote, keppra), hx of CABG (2020) presents for potential DDRT.       Plan:  -Admit to Transplant Surgery   -NPO  -PSH and pre transplant labs pending  -COVID swab pending  -EKG  -CXR pending  -abx/methylpred ordered for OR  -Plan on OR with Dr. Elisabeth bryant

## 2022-04-21 NOTE — BRIEF OPERATIVE NOTE - OPERATION/FINDINGS
donor renal transplant (R) to right iliac    1 arterial, 1 veinous anastomosis and ureter over the double J stent

## 2022-04-21 NOTE — CONSULT NOTE ADULT - ASSESSMENT
This is a 66 year old male with history of HTN, HLD, DM (on insulin), ESRD (2/2 DM and HTN) on HD via permacath since 3yrs ago MWF, afib (on eliquis 2.5mg BID), hx of stroke (2019 2/2 afib), focal seizure (on depakote, keppra), hx of CABG (2020), hx of nonbleeding gastric/duodenal ulcer (EGD 2/9/22, on protonix) presents for DDRT. Nephrology consulted for renal failure.     ESRD   from [facility]  Via left Perma cath   last HD 04/20/22   HD consent in the cart   plans for DDRT  transplant team on board   monitor BMP     HTN   elevated   monitor BP closely

## 2022-04-21 NOTE — BRIEF OPERATIVE NOTE - COMMENTS
OPO: DCTC  ABO:  B  High Risk: Yes (MSM)    KDPI: 82%  DCD: Yes   22min WIT  X Clamp Time: 4/20/2022 20:20

## 2022-04-21 NOTE — PATIENT PROFILE ADULT - FALL HARM RISK - HARM RISK INTERVENTIONS

## 2022-04-21 NOTE — CONSULT NOTE ADULT - ASSESSMENT
66 yr old man with ESRD due to DM on HD since 2019 presenting for DDRT. He received HD yesterday. Appears euvolemic. Labs pending. No need for HD prior to OR but will wait for Chem to decide.   Diabetes type II - start insulin sliding scale   HTN - on Coreg 6.25mg bid,  Amlodipine 5mg po daily , telmisartan 80mg daily and Hydralazine 10mg TID at home. Monitor BP off meds, resume Coreg and Amlodipine as needed post op   Seizure d/o - h/o recent breakthrough seizure - continue Depakote and Keppra   CAD s/p CABG - stable.   Afib on Eliquis at home - hold Eliquis for surgery   Immunosuppression per protocol - Simulect induction. Envarsus, Cellcept, steroids. Infection prophy - Nystatin, bactrim, Valcyte          66 yr old man with ESRD due to DM on HD since 2019 presenting for DDRT.  Donor 57 yr old, KDPI 82%, DCD WIT 22 min, Terminal cr 1.34. HLA mismatch 1,2,2. CMV +/+  He received HD yesterday. Appears euvolemic. Labs pending. No need for HD prior to OR but will wait for Chem to decide.   Diabetes type II - start insulin sliding scale   HTN - on Coreg 6.25mg bid,  Amlodipine 5mg po daily , telmisartan 80mg daily and Hydralazine 10mg TID at home. Monitor BP off meds, resume Coreg and Amlodipine as needed post op   Seizure d/o - h/o recent breakthrough seizure - continue Depakote and Keppra   CAD s/p CABG - stable.   Afib on Eliquis at home - hold Eliquis for surgery   Immunosuppression per protocol - Thymo induction. Envarsus, Cellcept, steroids. Infection prophy - Nystatin, bactrim, Valcyte

## 2022-04-21 NOTE — CONSULT NOTE ADULT - SUBJECTIVE AND OBJECTIVE BOX
[facility] [place] [facility]   MD NINA MASON MD, PA  SADIE CHEUNG NP    TEL:  OFFICE: [phone]    From 5pm-7am answering service [phone]    --- INITIAL RENAL CONSULT NOTE ---date of service 22 @ 13:23    HPI:  This is a 66 year old male with history of HTN, HLD, DM (on insulin), ESRD (2/2 DM and HTN) on HD via permacath since 3yrs ago MWF, afib (on eliquis 2.5mg BID), hx of stroke ( afib), focal seizure (on depakote, keppra), hx of CABG (), hx of nonbleeding gastric/duodenal ulcer (EGD 22, on protonix) presents for DDRT. Nephrology consulted for renal failure.       Recent testin22: ECG: Sinus Rhythm WDL (cardiologist Dr. Bose) (2022 09:23)        Allergies:  No Known Allergies      PAST MEDICAL & SURGICAL HISTORY:  Hypertension    Diabetes    Dyslipidemia    CAD (Coronary Artery Disease)  with Stents in 2009 and 2019, s/p off-pump C3L on 20    Hypothyroidism    CVA (cerebral vascular accident)  19 with residual bilateral weakness    Anemia    PEG (percutaneous endoscopic gastrostomy) status  removed 2020    Intubation of airway performed without difficulty  Dec 2019  CVA c/b status epilepticus requiring intubation and PEG in 2019-    ESRD on dialysis  M/W/F    Seizures  after CVA, last seizure was last week 22    History of insertion of stent into coronary artery bypass graft   and     S/P CABG x 3  off pump C3L on 20        Home Medications Reviewed    Hospital Medications:   MEDICATIONS  (STANDING):  ceFAZolin   IVPB 2000 milliGRAM(s) IV Intermittent once  methylPREDNISolone sodium succinate IVPB 500 milliGRAM(s) IV Intermittent once      SOCIAL HISTORY:  Denies ETOh, Smoking,     FAMILY HISTORY:  Family history of cancer of tongue (Father)  Parents are  . Father - at 80 years, tongue cancer was a smoker. Mother- at 71, had accident while crossing road. Siblings- 2 brothers and one sister.        REVIEW OF SYSTEMS:  CONSTITUTIONAL: No weakness, fevers or chills  EYES/ENT: No visual changes;  No vertigo or throat pain   NECK: No pain or stiffness  RESPIRATORY: No cough, wheezing, hemoptysis; No shortness of breath  CARDIOVASCULAR: No chest pain or palpitations.  GASTROINTESTINAL: No abdominal or epigastric pain. No nausea, vomiting, or hematemesis; No diarrhea or constipation. No melena or hematochezia.  GENITOURINARY: No hematuria  NEUROLOGICAL: No numbness or weakness  SKIN: No itching, burning, rashes, or lesions   VASCULAR: No bilateral lower extremity edema.   All other review of systems is negative unless indicated above.    VITALS:  T(F): 97.8 (22 @ 10:51), Max: 97.8 (22 @ 10:51)  HR: 62 (22 @ 10:51)  BP: 172/79 (22 @ 10:51)  RR: 20 (22 @ 10:51)  SpO2: 98% (22 @ 10:51)  Wt(kg): --    Height (cm): 177.8 ( @ 12:19)  Weight (kg): 61.6 ( @ 12:19)  BMI (kg/m2): 19.5 ( @ 12:19)  BSA (m2): 1.77 ( @ 12:19)    PHYSICAL EXAM:  Constitutional: NAD  HEENT: anicteric sclera, oropharynx clear, MMM  Neck: No JVD  Respiratory: CTAB, no wheezes, rales or rhonchi  Cardiovascular: S1, S2, RRR  Gastrointestinal: BS+, soft, NT/ND  Extremities: No cyanosis or clubbing. No peripheral edema  Neurological: A/O x 3, no focal deficits  Psychiatric: Normal mood, normal affect  : No CVA tenderness. No tucker.   Skin: No rashes  Vascular Access: left Perma cath     LABS:      140  |  103  |  42<H>  ----------------------------<  232<H>  4.7   |  20<L>  |  5.08<H>    Ca    8.7      2022 11:55  Phos  4.0       Mg     2.2         TPro  7.6  /  Alb  3.4  /  TBili  0.3  /  DBili      /  AST  15  /  ALT  7<L>  /  AlkPhos  114  04-21    Creatinine Trend: 5.08 <--                        11.3   5.88  )-----------( 155      ( 2022 10:54 )             38.9     Urine Studies:        RADIOLOGY & ADDITIONAL STUDIES:

## 2022-04-22 NOTE — PROGRESS NOTE ADULT - ASSESSMENT
66 year old male with history of HTN, HLD, DM (on insulin), ESRD (2/2 DM and HTN) on HD via permacath since 3yrs ago MWF, afib (on eliquis 2.5mg BID), hx of stroke (2019 2/2 afib), focal seizure (on depakote, keppra), hx of CABG (2020), hx of nonbleeding gastric/duodenal ulcer (EGD 2/9/22, on protonix) presents for potential DDRT. Makes minimal urine. Last HD: 4/20 (nephrologist, Dr. Barrientos). He is now s/p DCD DDRT with thymo induction and STENT placement.     -s/p DDRT w/ Thymo Induction- POD1  -continue IVF  -CBC,CMP, Mg/Phos, Coags, [place] level   -Trend labs  -Strictr I&O's  -SCD  -tylenol prn for pain  -lasix 80 IV today    Immuno:   -Envarsus per level  -MMF 1G bid  -steroid taper  -thymo induction  -PPX w/ Valcyte, Nystatin Bactrim    HTN:  -received hydralazine and labetolol overnight  -continue coreg    DM  -monitor BG, continue ISS    afib  -hold eliquis today. rate controlled    Seizure  -resume depakote and keppra home meds

## 2022-04-22 NOTE — PROGRESS NOTE ADULT - SUBJECTIVE AND OBJECTIVE BOX
[facility] DIVISION OF KIDNEY DISEASES AND HYPERTENSION -- FOLLOW UP NOTE  --------------------------------------------------------------------------------      CHAD DUNBAR was seen and examined at bedside.     24 hour events: Pt rafa DDRT on 4/21/22, post op was noted to have serum k of 5.6 and was treated medically. Pt also noted to have sangenous ALINA output ~ 555 cc. Overnight noted to be hypertensive recieved IV labetalol and hydralazine. Urine output has not improved.     subjective: Reports feeling okay, denies pain.       Standing Inpatient Medications  acetaminophen     Tablet .. 650 milliGRAM(s) Oral once  amLODIPine   Tablet 10 milliGRAM(s) Oral daily  antithymocyte globulin rabbit (peripheral) IVPB w/additives 100 milliGRAM(s) IV Intermittent once  carvedilol 3.125 milliGRAM(s) Oral every 12 hours  dextrose 5%. 1000 milliLiter(s) IV Continuous <Continuous>  dextrose 5%. 1000 milliLiter(s) IV Continuous <Continuous>  dextrose 50% Injectable 25 Gram(s) IV Push once  dextrose 50% Injectable 12.5 Gram(s) IV Push once  dextrose 50% Injectable 25 Gram(s) IV Push once  diphenhydrAMINE Injectable 25 milliGRAM(s) IV Push once  diVALproex  milliGRAM(s) Oral <User Schedule>  glucagon  Injectable 1 milliGRAM(s) IntraMuscular once  insulin lispro (ADMELOG) corrective regimen sliding scale   SubCutaneous three times a day before meals  insulin lispro (ADMELOG) corrective regimen sliding scale   SubCutaneous at bedtime  levETIRAcetam 250 milliGRAM(s) Oral two times a day  methylPREDNISolone sodium succinate IVPB 250 milliGRAM(s) IV Intermittent once  mycophenolate mofetil 1 Gram(s) Oral <User Schedule>  nystatin    Suspension [id number] Unit(s) Swish and Swallow four times a day  sodium chloride 0.45%. 500 milliLiter(s) IV Continuous <Continuous>  sodium chloride 0.9%. 1000 milliLiter(s) IV Continuous <Continuous>  trimethoprim   80 mG/sulfamethoxazole 400 mG 1 Tablet(s) Oral daily  valGANciclovir 450 milliGRAM(s) Oral <User Schedule>    PRN Inpatient Medications  chlorhexidine 2% Cloths 1 Application(s) Topical daily PRN  dextrose Oral Gel 15 Gram(s) Oral once PRN  ondansetron Injectable 4 milliGRAM(s) IV Push every 6 hours PRN        VITALS/PHYSICAL EXAM  --------------------------------------------------------------------------------  T(C): 36.9 (04-22-22 @ 11:53), Max: 37.2 (04-22-22 @ 11:50)  HR: 60 (04-22-22 @ 11:53) (53 - 75)  BP: 177/79 (04-22-22 @ 11:53) (121/58 - 196/92)  RR: 18 (04-22-22 @ 11:53) (14 - 18)  SpO2: 100% (04-22-22 @ 11:53) (93% - 100%)  Wt(kg): --  Height (cm): 177.8 (04-21-22 @ 15:57)  Weight (kg): 61.6 (04-21-22 @ 15:57)  BMI (kg/m2): 19.5 (04-21-22 @ 15:57)  BSA (m2): 1.77 (04-21-22 @ 15:57)      04-21-22 @ 07:01  -  04-22-22 @ 07:00  --------------------------------------------------------  IN: 1217.6 mL / OUT: 665 mL / NET: 552.6 mL    04-22-22 @ 07:01  -  04-22-22 @ 13:07  --------------------------------------------------------  IN: 280 mL / OUT: 105 mL / NET: 175 mL      Physical Exam:  	Awake and alert, comfortable  No icterus   Moist oral mucosa, no thrush, + missing teeth   Left IJ tunneled dialysis catheter   Lungs clear b/l  Regular S1 and S2, no mumur  Abdomen soft, non distended, non tender, RLQ transplant site mildly tender, drain+  LLE edema +1 (s/p recent ankle sprain),  no RLE edema   2+ pedal pulses     LABS/STUDIES  --------------------------------------------------------------------------------              9.1    15.54 >-----------<  146      [04-22-22 @ 03:40]              30.7     138  |  101  |  49  ----------------------------<  235      [04-22-22 @ 05:43]  4.6   |  18  |  5.62        Ca     8.2     [04-22-22 @ 05:43]      Mg     2.1     [04-22-22 @ 03:41]      Phos  4.7     [04-22-22 @ 03:41]    Creatinine Trend:  SCr 5.62 [04-22 @ 05:43]  SCr 5.64 [04-22 @ 03:41]  SCr 5.14 [04-21 @ 22:36]  SCr 5.08 [04-21 @ 11:55]  SCr 3.79 [04-06 @ 22:22]      CAPILLARY BLOOD GLUCOSE      POCT Blood Glucose.: 190 mg/dL (22 Apr 2022 07:24)  POCT Blood Glucose.: 201 mg/dL (22 Apr 2022 05:52)  POCT Blood Glucose.: 214 mg/dL (22 Apr 2022 05:25)  POCT Blood Glucose.: 144 mg/dL (22 Apr 2022 04:43)  POCT Blood Glucose.: 93 mg/dL (21 Apr 2022 22:45)  POCT Blood Glucose.: 172 mg/dL (21 Apr 2022 13:09)      Iron 55, TIBC 191, %sat 29      [02-04-22 @ 00:04]  Ferritin 1287      [02-04-22 @ 00:04]  PTH -- (Ca 9.2)      [02-05-22 @ 10:13]   294  HbA1c 6.0      [02-21-20 @ 08:53]  Lipid: chol 118, TG 54, HDL 59, LDL --      [06-27-21 @ 09:44]    HBsAb 01375.2      [04-21-22 @ 14:13]  HBsAg Nonreact      [04-21-22 @ 14:13]  HBcAb Reactive      [04-21-22 @ 14:13]  HCV 0.25, Nonreact      [04-21-22 @ 14:13]  HIV Nonreact      [04-21-22 @ 14:47]

## 2022-04-22 NOTE — CONSULT NOTE ADULT - ASSESSMENT
DDRT performed    HTN:/ DM  Management as per surgical transplant team  Patient form my office as PCP.

## 2022-04-22 NOTE — PACU DISCHARGE NOTE - MENTAL STATUS: PATIENT PARTICIPATION
Awake see sheet Wound/Surgical Site with redness, or foul smelling discharge or pus/Bleeding that does not stop/Swelling that gets worse/Pain not relieved by Medications/Inability to tolerate liquids or foods/Fever greater than (need to indicate Fahrenheit or Celsius)

## 2022-04-22 NOTE — PROGRESS NOTE ADULT - ASSESSMENT
This is a 66 year old male with history of HTN, HLD, DM (on insulin), ESRD (2/2 DM and HTN) on HD via permacath since 3yrs ago MWF, afib (on eliquis 2.5mg BID), hx of stroke (2019 2/2 afib), focal seizure (on depakote, keppra), hx of CABG (2020), hx of nonbleeding gastric/duodenal ulcer (EGD 2/9/22, on protonix) presents for DDRT. Nephrology consulted for renal failure.     A/P   DDRT on 4/21/22.   Last HD on 4/20/22  transplant team on board   Immunosuppressant as per transplant team    HD on hold per transplant team   HD consent in the cart   Monitor renal function and urine output closely.     HTN   acceptable   monitor BP closely

## 2022-04-22 NOTE — CONSULT NOTE ADULT - SUBJECTIVE AND OBJECTIVE BOX
Patient is a 66y old  Male who presents with a chief complaint of ESRD (2022 13:27)      HPI:  This is a 66 year old male with history of HTN, HLD, DM (on insulin), ESRD (2/2 DM and HTN) on HD via permacath since 3yrs ago MWF, afib (on eliquis 2.5mg BID), hx of stroke ( afib), focal seizure (on depakote, keppra), hx of CABG (), hx of nonbleeding gastric/duodenal ulcer (EGD 22, on protonix) presents for potential DDRT.     Makes minimal urine  Last HD: On  (nephrologist, Dr. Barrientos)  S/p DDRT on 2022    Recent testin22: ECG: Sinus Rhythm WDL (cardiologist Dr. Bose) (2022 09:23)      PAST MEDICAL & SURGICAL HISTORY:  Hypertension    Diabetes    Dyslipidemia    CAD (Coronary Artery Disease)  with Stents in 2009 and 2019, s/p off-pump C3L on 20    Hypothyroidism    CVA (cerebral vascular accident)  19 with residual bilateral weakness    Anemia    PEG (percutaneous endoscopic gastrostomy) status  removed 2020    Intubation of airway performed without difficulty  Dec 2019  CVA c/b status epilepticus requiring intubation and PEG in 2019-    ESRD on dialysis  M/W/F    Seizures  after CVA, last seizure was last week 22    History of insertion of stent into coronary artery bypass graft   and     S/P CABG x 3  off pump C3L on 20        Review of Systems:   CONSTITUTIONAL: No fever, weight loss, or fatigue  EYES: No eye pain, visual disturbances, or discharge  ENMT:  No difficulty hearing, tinnitus, vertigo; No sinus or throat pain  NECK: No pain or stiffness  BREASTS: No pain, masses, or nipple discharge  RESPIRATORY: No cough, wheezing, chills or hemoptysis; No shortness of breath  CARDIOVASCULAR: No chest pain, palpitations, dizziness, or leg swelling  GASTROINTESTINAL: No abdominal or epigastric pain. No nausea, vomiting, or hematemesis; No diarrhea or constipation. No melena or hematochezia.  GENITOURINARY: No dysuria, frequency, hematuria, or incontinence  NEUROLOGICAL: No headaches, memory loss, loss of strength, numbness, or tremors  SKIN: No itching, burning, rashes, or lesions   LYMPH NODES: No enlarged glands  ENDOCRINE: No heat or cold intolerance; No hair loss  MUSCULOSKELETAL: No joint pain or swelling; No muscle, back, or extremity pain  PSYCHIATRIC: No depression, anxiety, mood swings, or difficulty sleeping  HEME/LYMPH: No easy bruising, or bleeding gums  ALLERY AND IMMUNOLOGIC: No hives or eczema    Allergies    No Known Allergies    Intolerances        Social History:     FAMILY HISTORY:  Family history of cancer of tongue (Father)  Parents are  . Father - at 80 years, tongue cancer was a smoker. Mother- at 71, had accident while crossing road. Siblings- 2 brothers and one sister.        MEDICATIONS  (STANDING):  amLODIPine   Tablet 10 milliGRAM(s) Oral daily  atorvastatin 40 milliGRAM(s) Oral at bedtime  carvedilol 3.125 milliGRAM(s) Oral every 12 hours  dextrose 5%. 1000 milliLiter(s) (50 mL/Hr) IV Continuous <Continuous>  dextrose 5%. 1000 milliLiter(s) (100 mL/Hr) IV Continuous <Continuous>  dextrose 50% Injectable 25 Gram(s) IV Push once  dextrose 50% Injectable 12.5 Gram(s) IV Push once  dextrose 50% Injectable 25 Gram(s) IV Push once  diVALproex  milliGRAM(s) Oral <User Schedule>  glucagon  Injectable 1 milliGRAM(s) IntraMuscular once  insulin lispro (ADMELOG) corrective regimen sliding scale   SubCutaneous three times a day before meals  insulin lispro (ADMELOG) corrective regimen sliding scale   SubCutaneous at bedtime  levETIRAcetam 250 milliGRAM(s) Oral two times a day  levothyroxine 50 MICROGram(s) Oral daily  mycophenolate mofetil 1 Gram(s) Oral <User Schedule>  nystatin    Suspension 828698 Unit(s) Swish and Swallow four times a day  sodium chloride 0.9%. 1000 milliLiter(s) (70 mL/Hr) IV Continuous <Continuous>  trimethoprim   80 mG/sulfamethoxazole 400 mG 1 Tablet(s) Oral daily  valGANciclovir 450 milliGRAM(s) Oral <User Schedule>    MEDICATIONS  (PRN):  chlorhexidine 2% Cloths 1 Application(s) Topical daily PRN cloth  dextrose Oral Gel 15 Gram(s) Oral once PRN Blood Glucose LESS THAN 70 milliGRAM(s)/deciliter  ondansetron Injectable 4 milliGRAM(s) IV Push every 6 hours PRN Nausea and/or Vomiting        CAPILLARY BLOOD GLUCOSE      POCT Blood Glucose.: 251 mg/dL (2022 21:12)  POCT Blood Glucose.: 298 mg/dL (2022 17:05)  POCT Blood Glucose.: 223 mg/dL (2022 16:29)  POCT Blood Glucose.: 219 mg/dL (2022 13:23)  POCT Blood Glucose.: 190 mg/dL (2022 07:24)  POCT Blood Glucose.: 201 mg/dL (2022 05:52)  POCT Blood Glucose.: 214 mg/dL (2022 05:25)  POCT Blood Glucose.: 144 mg/dL (2022 04:43)    I&O's Summary    2022 07:01  -  2022 07:00  --------------------------------------------------------  IN: 1217.6 mL / OUT: 665 mL / NET: 552.6 mL    2022 07:01  -  2022 23:46  --------------------------------------------------------  IN: 2163.2 mL / OUT: 515 mL / NET: 1648.2 mL        PHYSICAL EXAM:  Vital Signs Last 24 Hrs  T(C): 36.7 (2022 23:05), Max: 37.2 (2022 11:50)  T(F): 98 (2022 23:05), Max: 98.9 (2022 11:50)  HR: 56 (2022 23:05) (55 - 75)  BP: 124/59 (2022 23:05) (121/56 - 177/81)  BP(mean): 99 (2022 07:00) (86 - 119)  RR: 18 (2022 23:05) (14 - 18)  SpO2: 100% (2022 23:05) (93% - 100%)    GENERAL: NAD, well-developed  HEAD:  Atraumatic, Normocephalic  EYES: EOMI, PERRLA, conjunctiva and sclera clear  NECK: Supple, No JVD  CHEST/LUNG: Clear to auscultation bilaterally; No wheeze  HEART: Regular rate and rhythm; No murmurs, rubs, or gallops  ABDOMEN: Soft, Nontender, Nondistended; Bowel sounds present, drain +  EXTREMITIES:  2+ Peripheral Pulses, No clubbing, cyanosis, or edema  PSYCH: AAOx3  NEUROLOGY: non-focal  SKIN: No rashes or lesions    LABS:                        8.5    15.34 )-----------( 137      ( 2022 14:58 )             29.4     04-22    132<L>  |  97  |  62<H>  ----------------------------<  276<H>  6.0<H>   |  16<L>  |  6.56<H>    Ca    8.8      2022 20:34  Phos  4.7     -  Mg     2.1     -22    TPro  6.1  /  Alb  2.7<L>  /  TBili  0.3  /  DBili  x   /  AST  44<H>  /  ALT  11  /  AlkPhos  91  04-22    PT/INR - ( 2022 10:54 )   PT: 17.5 sec;   INR: 1.50 ratio         PTT - ( 2022 10:54 )  PTT:40.0 sec          RADIOLOGY & ADDITIONAL TESTS:    Imaging Personally Reviewed:    Consultant(s) Notes Reviewed:      Care Discussed with Consultants/Other Providers:

## 2022-04-22 NOTE — PROGRESS NOTE ADULT - SUBJECTIVE AND OBJECTIVE BOX
CHAD DUNBAR is a 66y Male s/p DDRT on 4/21/22. PMH is significant for HTN, DM, HLD    NKDA  CMV +/+    Transplant Medications  Induction  -Thymoglobulin 1.5 mg/kg/dose x 4 doses (cumulative dose of 6 mg/kg)        Premedicate one hour prior to administration with                -Acetaminophen 650 mg PO/TN                -Diphenhydramine 50 mg IV/PO                -Steroid taper        Adjust dose for WBC < 3 or PLT < 75  -Methyprednisolone taper (switch to PO prednisone on POD 4)            POD 0: 500 mg IV in OR            POD 1: 125 mg IV Q12H            POD 2: 60 mg IV Q12H            POD 3: 30 mg IV Q12H        Maintenance Immunosuppression  -Tacrolimus (Envarsus) 0.14 mg/kg daily (Adjust for goal trough: 8-10)  -Mycophenolate 1,000 mg PO Q12H  -Prednisone             POD 4: 20 mg PO Q12H            POD 5: 10 mg PO Q12H            POD 6: 5 mg PO Q12H            POD 7-: 5 mg daily     Anti-infection   -Bactrim SS tablet (frequency based on renal function)  -Valganciclovir (dose based on CMV serostatus and frequency based on renal function)  -Nystatin swish and swallow 5 mL four times daily    Surgical prophylaxis pre- and intra-operative dosing  -Cefazolin    Prophylaxis  -GI ppx: famotidine 20 mg daily  -Bowel ppx: senna  -DVT: sequential compression device  -Pain:            Mild: Acetaminophen 650 mg every 6 hours PRN           Moderate: Tramadol 25 mg every 4 hours PRN (adjust for renal function)           Severe: Tramadol 50 mg every 4 hours PRN (adjust for renal function)    Home Medications:  amLODIPine 5 mg oral tablet: 1 tab(s) orally once a day (19 Apr 2022 09:35)  apixaban 2.5 mg oral tablet: 1 tab(s) orally 2 times a day (19 Apr 2022 09:35)  atorvastatin 80 mg oral tablet: 1 tab(s) orally once a day (at bedtime) (19 Apr 2022 09:35)  Coreg 6.25 mg oral tablet: 1 tab(s) orally 2 times a day (19 Apr 2022 09:35)  Depakote 250 mg oral delayed release tablet: 1 tab(s) orally 1 times a day (19 Apr 2022 09:35)  Depakote 500 mg oral delayed release tablet: 1 tab(s) orally 2 times a day (19 Apr 2022 09:35)  hydrALAZINE 10 mg oral tablet: orally 3 times a day (19 Apr 2022 09:35)  insulin lispro 100 units/mL injectable solution: 2 Unit(s) if Glucose 151 - 200  4 Unit(s) if Glucose 201 - 250  6 Unit(s) if Glucose 251 - 300  8 Unit(s) if Glucose 301 - 350  10 Unit(s) if Glucose 351 - 400  12 Unit(s) if Glucose Greater Than 400 (19 Apr 2022 09:35)  levETIRAcetam 250 mg oral tablet: 1 tab(s) orally 2 times a day and an extra dose of 125mg dialysis on dialysis days (19 Apr 2022 09:35)  Protonix 40 mg oral delayed release tablet: 1 tab(s) orally once a day (19 Apr 2022 09:35)  Retacrit 10,000 units/mL injectable solution: injectable 3 times a week INTRADIALYSIS (19 Apr 2022 09:35)  sevelamer hydrochloride 800 mg oral tablet: 1 tab(s) orally 3 times a day (19 Apr 2022 09:35)  telmisartan 80 mg oral tablet: 1 tab(s) orally once a day (19 Apr 2022 09:35)    Outpatient medication reconciliation reviewed and will be re-started appropriately.  Plan discussed with multidisciplinary team.

## 2022-04-22 NOTE — PROGRESS NOTE ADULT - SUBJECTIVE AND OBJECTIVE BOX
Transplant Surgery - Multidisciplinary Rounds  --------------------------------------------------------------  DDRT Date: 22         POD#1    Present: Patient seen and examined with multidisciplinary team including Transplant Surgeon: Dr. Barber Nephrologist: Dr. Nova, [facility] resident during am rounds.  Disciplines not in attendance will be notified of the plan.    HPI: 66 year old male with history of HTN, HLD, DM (on insulin), ESRD (2/2 DM and HTN) on HD via permacath since 3yrs ago MWF, afib (on eliquis 2.5mg BID), hx of stroke ( afib), focal seizure (on depakote, keppra), hx of CABG (), hx of nonbleeding gastric/duodenal ulcer (EGD 22, on protonix) presents for potential DDRT. Makes minimal urine. Last HD:  (nephrologist, Dr. Barrientos)     -Now s/p DCD DDRT with thymo induction and STENT placement w/ 22.     OR details: DCD DDRT with Thymo induction with stent placement  1 artery, 1 vein, 1 ureter  Cold ischemia time 22 hr      Recipient:  Srinivasan Castelan  /Age:    1955  67 y/o M  Hx:  DM, HTN, CAD s/p Stent (2019), CVA             Covid Vaccine:   Moderna x 3  Nephrologist: Dr. Sneed  CPRA:    0% Luminex dated 3/2/2022  ABO:      B  CMV:  Positive    EBV Status:  Positive  Last HD:  2022  NPO:  Yes  ALLERGIES: NKDA    Covid Testing: Negative done on admission 22  Pt Location-[address] [place] [address] -Treatment room  Financially cleared- Yes  Recipient OR- 22  1500 pm  Surgeon-Dr. Barber       Donor (import): [facility]    Donor ID: [id number]   Match: [id number]  OPO: [facility]  Age:  57 y.o M  ABO:  B  High Risk: Yes (MSM)    KDPI: 82%  DCD: Yes   22min WIT  COD:  CVA ICH  X Clamp Time: 2022 20:20  Medical Hx:  ETOH abuse, polysubstance abuse  Terminal Cr:  1.34  Covid Testing: Negative NP 2022  CMV-Positive      EBV-Positive  HepBcAb-Negative  Hepatitis C-OPAL- Negative  Hepatitis C ab-Negative     MISMATCH: 1,2,2  Kidney ETA to [facility]:  22 1500  Kidney Laterality: Right  X-match –virtual negative    Interval Events:  -POD#1 DCD DDRT with stent  -postop US: slight increase in flow at iliac a. anastomosis  -low UOP 90cc postop, Cr 5.62<----5308  -high ALINA output, compression dressing improved    Potential Discharge date: Pending clinical improvement.     Education:  Medications    Plan of care:  See Below  MEDICATIONS  (STANDING):  carvedilol 3.125 milliGRAM(s) Oral every 12 hours  dextrose 5%. 1000 milliLiter(s) (50 mL/Hr) IV Continuous <Continuous>  dextrose 5%. 1000 milliLiter(s) (100 mL/Hr) IV Continuous <Continuous>  dextrose 50% Injectable 25 Gram(s) IV Push once  dextrose 50% Injectable 12.5 Gram(s) IV Push once  dextrose 50% Injectable 25 Gram(s) IV Push once  famotidine    Tablet 20 milliGRAM(s) Oral daily  glucagon  Injectable 1 milliGRAM(s) IntraMuscular once  insulin lispro (ADMELOG) corrective regimen sliding scale   SubCutaneous three times a day before meals  insulin lispro (ADMELOG) corrective regimen sliding scale   SubCutaneous at bedtime  mycophenolate mofetil 1 Gram(s) Oral <User Schedule>  nystatin    Suspension 934563 Unit(s) Swish and Swallow four times a day  sodium chloride 0.45%. 500 milliLiter(s) (50 mL/Hr) IV Continuous <Continuous>  sodium chloride 0.9%. 1000 milliLiter(s) (70 mL/Hr) IV Continuous <Continuous>  trimethoprim   80 mG/sulfamethoxazole 400 mG 1 Tablet(s) Oral daily  valGANciclovir 450 milliGRAM(s) Oral daily    MEDICATIONS  (PRN):  dextrose Oral Gel 15 Gram(s) Oral once PRN Blood Glucose LESS THAN 70 milliGRAM(s)/deciliter  ondansetron Injectable 4 milliGRAM(s) IV Push every 6 hours PRN Nausea and/or Vomiting      PAST MEDICAL & SURGICAL HISTORY:  Hypertension    Diabetes    Dyslipidemia    CAD (Coronary Artery Disease)  with Stents in 2009 and 2019, s/p off-pump C3L on 20    Hypothyroidism    CVA (cerebral vascular accident)  19 with residual bilateral weakness    Anemia    PEG (percutaneous endoscopic gastrostomy) status  removed 2020    Intubation of airway performed without difficulty  Dec 2019  CVA c/b status epilepticus requiring intubation and PEG in 2019-    ESRD on dialysis  M/W/F    Seizures  after CVA, last seizure was last week 22    History of insertion of stent into coronary artery bypass graft   and     S/P CABG x 3  off pump C3L on 20        Vital Signs Last 24 Hrs  T(C): 36.9 (2022 09:00), Max: 36.9 (2022 09:00)  T(F): 98.5 (2022 09:00), Max: 98.5 (2022 09:00)  HR: 59 (2022 09:25) (53 - 75)  BP: 152/70 (2022 09:25) (121/58 - 196/92)  BP(mean): 99 (2022 07:00) (83 - 119)  RR: 18 (2022 09:00) (14 - 20)  SpO2: 100% (2022 09:00) (93% - 100%)    I&O's Summary    2022 07:  -  2022 07:00  --------------------------------------------------------  IN: 1217.6 mL / OUT: 665 mL / NET: 552.6 mL    2022 07:01  -  2022 09:50  --------------------------------------------------------  IN: 140 mL / OUT: 25 mL / NET: 115 mL                              9.1    15.54 )-----------( 146      ( 2022 03:40 )             30.7         138  |  101  |  49<H>  ----------------------------<  235<H>  4.6   |  18<L>  |  5.62<H>    Ca    8.2<L>      2022 05:43  Phos  4.7       Mg     2.1         TPro  6.1  /  Alb  2.7<L>  /  TBili  0.3  /  DBili  x   /  AST  44<H>  /  ALT  11  /  AlkPhos  91          Review of systems  Gen: No weight changes, fatigue, fevers/chills, weakness  Skin: No rashes  Head/Eyes/Ears/Mouth: No headache; Normal hearing; Normal vision w/o blurriness; No sinus pain/discomfort, sore throat  Respiratory: No dyspnea, cough, wheezing, hemoptysis  CV: No chest pain, PND, orthopnea  GI: C/O mild abdominal pain at surgical site. no diarrhea, constipation, nausea, vomiting, melena, hematochezia  : No increased frequency, dysuria, hematuria, nocturia  MSK: No joint pain/swelling; no back pain; no edema  Neuro: No dizziness/lightheadedness, weakness, seizures, numbness, tingling  Heme: No easy bruising or bleeding  Endo: No heat/cold intolerance  Psych: No significant nervousness, anxiety, stress, depression  All other systems were reviewed and are negative, except as noted.      PHYSICAL EXAM:  Constitutional: Well developed / well nourished  Eyes: PERRLA  ENMT: nc/at, no thrush  Neck: supple, central line  Respiratory: CTA B/L  Cardiovascular: RRR  Gastrointestinal: Soft abdomen, ND, appropriate incisional TTP, c/d/i, ALINA serosanguinous output  Genitourinary: Urinary catheter in place  Extremities: SCD's in place and working bilaterally  Vascular: Palpable dp pulses bilaterally.   Neurological: A&O x3  Skin: no rashes, ulcerations, lesions  Musculoskeletal: Moving all extremities  Psychiatric: Responsive Transplant Surgery - Multidisciplinary Rounds  --------------------------------------------------------------  DDRT Date: 22         POD#1    Present: Patient seen and examined with multidisciplinary team including Transplant Surgeon: Dr. Barber Nephrologist: Dr. Nova, [facility] resident during am rounds.  Disciplines not in attendance will be notified of the plan.    HPI: 66 year old male with history of HTN, HLD, DM (on insulin), ESRD (2/2 DM and HTN) on HD via permacath since 3yrs ago MWF, afib (on eliquis 2.5mg BID), hx of stroke ( afib), focal seizure (on depakote, keppra), hx of CABG (), hx of nonbleeding gastric/duodenal ulcer (EGD 22, on protonix) presents for potential DDRT. Makes minimal urine. Last HD:  (nephrologist, Dr. Barrientos)     -Now s/p DCD DDRT with thymo induction and STENT placement w/ 22.     OR details: DCD DDRT with Thymo induction with stent placement  1 artery, 1 vein, 1 ureter  Cold ischemia time 22 hr      Recipient:  Srinivasan Castelan  /Age:    1955  67 y/o M  Hx:  DM, HTN, CAD s/p Stent (2019), CVA             Covid Vaccine:   Moderna x 3  Nephrologist: Dr. Sneed  CPRA:    0% Luminex dated 3/2/2022  ABO:      B  CMV:  Positive    EBV Status:  Positive  Last HD:  2022  NPO:  Yes  ALLERGIES: NKDA    Covid Testing: Negative done on admission 22  Pt Location-[address] [place] [address] -Treatment room  Financially cleared- Yes  Recipient OR- 22  1500 pm  Surgeon-Dr. Barber       Donor [facility]import): [facility]    Donor ID: [id number]   Match: [id number]  OPO: [facility]  Age:  57 y.o M  ABO:  B  High Risk: Yes (MSM)    KDPI: 82%  DCD: Yes   22min WIT  COD:  CVA ICH  X Clamp Time: 2022 20:20  Medical Hx:  ETOH abuse, polysubstance abuse  Terminal Cr:  1.34  Covid Testing: Negative NP 2022  CMV-Positive      EBV-Positive  HepBcAb-Negative  Hepatitis C-OPAL- Negative  Hepatitis C ab-Negative     MISMATCH: 1,2,2  Kidney ETA to [facility]:  22 1500  Kidney Laterality: Right  X-match –virtual negative    Interval Events:  -POD#1 DCD DDRT with stent  -postop US: slight increase in flow at iliac a. anastomosis  -low UOP 90cc postop, Cr 5.62<----5308  -hyperkalemia 5.6 - shifted and now 4.6  -high ALINA output, compression dressing improved    Potential Discharge date: Pending clinical improvement.     Education:  Medications    Plan of care:  See Below  MEDICATIONS  (STANDING):  carvedilol 3.125 milliGRAM(s) Oral every 12 hours  dextrose 5%. 1000 milliLiter(s) (50 mL/Hr) IV Continuous <Continuous>  dextrose 5%. 1000 milliLiter(s) (100 mL/Hr) IV Continuous <Continuous>  dextrose 50% Injectable 25 Gram(s) IV Push once  dextrose 50% Injectable 12.5 Gram(s) IV Push once  dextrose 50% Injectable 25 Gram(s) IV Push once  famotidine    Tablet 20 milliGRAM(s) Oral daily  glucagon  Injectable 1 milliGRAM(s) IntraMuscular once  insulin lispro (ADMELOG) corrective regimen sliding scale   SubCutaneous three times a day before meals  insulin lispro (ADMELOG) corrective regimen sliding scale   SubCutaneous at bedtime  mycophenolate mofetil 1 Gram(s) Oral <User Schedule>  nystatin    Suspension 456805 Unit(s) Swish and Swallow four times a day  sodium chloride 0.45%. 500 milliLiter(s) (50 mL/Hr) IV Continuous <Continuous>  sodium chloride 0.9%. 1000 milliLiter(s) (70 mL/Hr) IV Continuous <Continuous>  trimethoprim   80 mG/sulfamethoxazole 400 mG 1 Tablet(s) Oral daily  valGANciclovir 450 milliGRAM(s) Oral daily    MEDICATIONS  (PRN):  dextrose Oral Gel 15 Gram(s) Oral once PRN Blood Glucose LESS THAN 70 milliGRAM(s)/deciliter  ondansetron Injectable 4 milliGRAM(s) IV Push every 6 hours PRN Nausea and/or Vomiting      PAST MEDICAL & SURGICAL HISTORY:  Hypertension    Diabetes    Dyslipidemia    CAD (Coronary Artery Disease)  with Stents in 2009 and 2019, s/p off-pump C3L on 20    Hypothyroidism    CVA (cerebral vascular accident)  19 with residual bilateral weakness    Anemia    PEG (percutaneous endoscopic gastrostomy) status  removed 2020    Intubation of airway performed without difficulty  Dec 2019  CVA c/b status epilepticus requiring intubation and PEG in 2019-    ESRD on dialysis  M/W/F    Seizures  after CVA, last seizure was last week 22    History of insertion of stent into coronary artery bypass graft   and     S/P CABG x 3  off pump C3L on 20        Vital Signs Last 24 Hrs  T(C): 36.9 (2022 09:00), Max: 36.9 (2022 09:00)  T(F): 98.5 (2022 09:00), Max: 98.5 (2022 09:00)  HR: 59 (2022 09:25) (53 - 75)  BP: 152/70 (2022 09:25) (121/58 - 196/92)  BP(mean): 99 (2022 07:00) (83 - 119)  RR: 18 (2022 09:00) (14 - 20)  SpO2: 100% (2022 09:00) (93% - 100%)    I&O's Summary    2022 07:01  -  2022 07:00  --------------------------------------------------------  IN: 1217.6 mL / OUT: 665 mL / NET: 552.6 mL    2022 07:01  -  2022 09:50  --------------------------------------------------------  IN: 140 mL / OUT: 25 mL / NET: 115 mL                              9.1    15.54 )-----------( 146      ( 2022 03:40 )             30.7         138  |  101  |  49<H>  ----------------------------<  235<H>  4.6   |  18<L>  |  5.62<H>    Ca    8.2<L>      2022 05:43  Phos  4.7       Mg     2.1         TPro  6.1  /  Alb  2.7<L>  /  TBili  0.3  /  DBili  x   /  AST  44<H>  /  ALT  11  /  AlkPhos  91          Review of systems  Gen: No weight changes, fatigue, fevers/chills, weakness  Skin: No rashes  Head/Eyes/Ears/Mouth: No headache; Normal hearing; Normal vision w/o blurriness; No sinus pain/discomfort, sore throat  Respiratory: No dyspnea, cough, wheezing, hemoptysis  CV: No chest pain, PND, orthopnea  GI: C/O mild abdominal pain at surgical site. no diarrhea, constipation, nausea, vomiting, melena, hematochezia  : No increased frequency, dysuria, hematuria, nocturia  MSK: No joint pain/swelling; no back pain; no edema  Neuro: No dizziness/lightheadedness, weakness, seizures, numbness, tingling  Heme: No easy bruising or bleeding  Endo: No heat/cold intolerance  Psych: No significant nervousness, anxiety, stress, depression  All other systems were reviewed and are negative, except as noted.      PHYSICAL EXAM:  Constitutional: Well developed / well nourished  Eyes: PERRLA  ENMT: nc/at, no thrush  Neck: supple, central line  Respiratory: CTA B/L  Cardiovascular: RRR  Gastrointestinal: Soft abdomen, ND, appropriate incisional TTP, c/d/i, ALINA serosanguinous output  Genitourinary: Urinary catheter in place  Extremities: SCD's in place and working bilaterally  Vascular: Palpable dp pulses bilaterally.   Neurological: A&O x3  Skin: no rashes, ulcerations, lesions  Musculoskeletal: Moving all extremities  Psychiatric: Responsive

## 2022-04-22 NOTE — PROGRESS NOTE ADULT - ATTENDING COMMENTS
S/p DDRT yesterday from DCD donor with Thymo induction.  UOP marginal - will give lasix 80mg iv x1.   Repeat BMP this afternoon, HD if K elevated.

## 2022-04-22 NOTE — PROGRESS NOTE ADULT - ASSESSMENT
66 yr old man with ESRD due to DM on HD since 2019 presenting for DDRT. Pt underwent DDRT on 4/21/22.     Donor 57 yr old, KDPI 82%, DCD WIT 22 min, Terminal cr 1.34. HLA mismatch 1,2,2. CMV +/+    Kidney transplant recipient,   s/p DDRT on 4/21/22.   Last HD on 4/20/22. Labs reviewed. Pt. with DGF with decreased urine output.   IV lasix 80 mg once today.   Labs reviewed.   May need HD if continues to have decreased UOP post diuretic challenge.   Primary Nephrologist Dr. Rey for HD.  Monitor labs and urine output.       Diabetes type II - start insulin sliding scale     HTN - on Coreg 6.25mg bid,  Amlodipine 5mg po daily , telmisartan 80mg daily and Hydralazine 10mg TID at home. OVernight hypertensive. Resume Coreg. Monitor BP.     Seizure d/o - h/o recent breakthrough seizure - continue Depakote and Keppra     CAD s/p CABG - stable.     Afib on Eliquis at home - hold Eliquis for now,.     Immunosuppression per protocol - Thymo induction. Envarsus, Cellcept, steroids. Infection prophy - Nystatin, bactrim, Valcyte

## 2022-04-22 NOTE — PROGRESS NOTE ADULT - SUBJECTIVE AND OBJECTIVE BOX
[facility]   MD NINA MASON MD, PA INJUNG KO NP    TEL:  OFFICE: [phone]    From 5pm-7am Answering Service [phone]    -- RENAL FOLLOW UP NOTE ---Date of Service 04-22-22 @ 13:27    Patient is a 66y old  Male who presents with a chief complaint of ESRD (22 Apr 2022 13:06)      Patient seen and examined at bedside. No chest pain/sob    VITALS:  T(F): 98.9 (04-22-22 @ 13:15), Max: 98.9 (04-22-22 @ 11:50)  HR: 61 (04-22-22 @ 13:15)  BP: 157/70 (04-22-22 @ 13:15)  RR: 18 (04-22-22 @ 13:15)  SpO2: 99% (04-22-22 @ 13:15)  Wt(kg): --    04-21 @ 07:01  -  04-22 @ 07:00  --------------------------------------------------------  IN: 1217.6 mL / OUT: 665 mL / NET: 552.6 mL    04-22 @ 07:01  -  04-22 @ 13:27  --------------------------------------------------------  IN: 420 mL / OUT: 155 mL / NET: 265 mL      Height (cm): 177.8 (04-21 @ 15:57)  Weight (kg): 61.6 (04-21 @ 15:57)  BMI (kg/m2): 19.5 (04-21 @ 15:57)  BSA (m2): 1.77 (04-21 @ 15:57)    PHYSICAL EXAM:  Constitutional: NAD  Neck: No JVD  Respiratory: CTAB, no wheezes, rales or rhonchi  Cardiovascular: S1, S2, RRR  Gastrointestinal: BS+, soft, NT/ND  Extremities: No peripheral edema    Hospital Medications:   MEDICATIONS  (STANDING):  acetaminophen     Tablet .. 650 milliGRAM(s) Oral once  amLODIPine   Tablet 10 milliGRAM(s) Oral daily  antithymocyte globulin rabbit (peripheral) IVPB w/additives 100 milliGRAM(s) IV Intermittent once  carvedilol 3.125 milliGRAM(s) Oral every 12 hours  dextrose 5%. 1000 milliLiter(s) (50 mL/Hr) IV Continuous <Continuous>  dextrose 5%. 1000 milliLiter(s) (100 mL/Hr) IV Continuous <Continuous>  dextrose 50% Injectable 25 Gram(s) IV Push once  dextrose 50% Injectable 12.5 Gram(s) IV Push once  dextrose 50% Injectable 25 Gram(s) IV Push once  diphenhydrAMINE Injectable 25 milliGRAM(s) IV Push once  diVALproex  milliGRAM(s) Oral <User Schedule>  glucagon  Injectable 1 milliGRAM(s) IntraMuscular once  insulin lispro (ADMELOG) corrective regimen sliding scale   SubCutaneous three times a day before meals  insulin lispro (ADMELOG) corrective regimen sliding scale   SubCutaneous at bedtime  levETIRAcetam 250 milliGRAM(s) Oral two times a day  methylPREDNISolone sodium succinate IVPB 250 milliGRAM(s) IV Intermittent once  mycophenolate mofetil 1 Gram(s) Oral <User Schedule>  nystatin    Suspension 789218 Unit(s) Swish and Swallow four times a day  sodium chloride 0.45%. 500 milliLiter(s) (50 mL/Hr) IV Continuous <Continuous>  sodium chloride 0.9%. 1000 milliLiter(s) (70 mL/Hr) IV Continuous <Continuous>  trimethoprim   80 mG/sulfamethoxazole 400 mG 1 Tablet(s) Oral daily  valGANciclovir 450 milliGRAM(s) Oral <User Schedule>      LABS:  04-22    138  |  101  |  49<H>  ----------------------------<  235<H>  4.6   |  18<L>  |  5.62<H>    Ca    8.2<L>      22 Apr 2022 05:43  Phos  4.7     04-22  Mg     2.1     04-22    TPro  6.1  /  Alb  2.7<L>  /  TBili  0.3  /  DBili      /  AST  44<H>  /  ALT  11  /  AlkPhos  91  04-22    Creatinine Trend: 5.62 <--, 5.64 <--, 5.14 <--, 5.08 <--    Albumin, Serum: 2.7 g/dL (04-22 @ 03:41)  Phosphorus Level, Serum: 4.7 mg/dL (04-22 @ 03:41)  Albumin, Serum: 2.7 g/dL (04-21 @ 22:36)  Phosphorus Level, Serum: 4.8 mg/dL (04-21 @ 22:36)                              9.1    15.54 )-----------( 146      ( 22 Apr 2022 03:40 )             30.7     Urine Studies:      Iron 55, TIBC 191, %sat 29      [02-04-22 @ 00:04]  Ferritin 1287      [02-04-22 @ 00:04]  PTH -- (Ca 9.2)      [02-05-22 @ 10:13]   294  HbA1c 6.0      [02-21-20 @ 08:53]  Lipid: chol 118, TG 54, HDL 59, LDL --      [06-27-21 @ 09:44]    HBsAb 30249.2      [04-21-22 @ 14:13]  HBsAg Nonreact      [04-21-22 @ 14:13]  HBcAb Reactive      [04-21-22 @ 14:13]  HCV 0.25, Nonreact      [04-21-22 @ 14:13]  HIV Nonreact      [04-21-22 @ 14:47]      RADIOLOGY & ADDITIONAL STUDIES:

## 2022-04-23 NOTE — DIETITIAN INITIAL EVALUATION ADULT - DIET TYPE
Recommend easy to chew + Consistent Carbohydrate with snack + renal diet upon discharge (spoke to transplant team). Defer diet/fluid consistencies to medical team/pt's preference. Recommend follow up visit with Transplant MD and outpatient RD for dietary modifications as warranted.

## 2022-04-23 NOTE — PROGRESS NOTE ADULT - ASSESSMENT
66 year old male with history of HTN, HLD, DM (on insulin), ESRD (2/2 DM and HTN) on HD via permacath since 3yrs ago MWF, afib (on eliquis 2.5mg BID), hx of stroke (2019 2/2 afib), focal seizure (on depakote, keppra), hx of CABG (2020), hx of nonbleeding gastric/duodenal ulcer (EGD 2/9/22, on protonix) presents for potential DDRT. Makes minimal urine. Last HD: 4/20 (nephrologist, Dr. Barrientos). He is now s/p DCD DDRT with thymo induction and STENT placement.     -s/p DDRT w/ Thymo Induction- POD1  -continue IVF  -CBC,CMP, Mg/Phos, Coags, [place] level   -Trend labs  -Strictr I&O's  -SCD  -tylenol prn for pain  -lasix 80 IV today    Immuno:   -Envarsus per level  -MMF 1G bid  -steroid taper  -thymo induction  -PPX w/ Valcyte, Nystatin Bactrim    HTN:  -received hydralazine and labetolol overnight  -continue coreg    DM  -monitor BG, continue ISS    afib  -hold eliquis today. rate controlled    Seizure  -resume depakote and keppra home meds Assessment: 66 year old male with history of HTN, HLD, DM (on insulin), ESRD (2/2 DM and HTN) on HD via permacath since 3yrs ago MWF, afib (on eliquis 2.5mg BID), hx of stroke (2019 2/2 afib), focal seizure (on depakote, keppra), hx of CABG (2020), hx of nonbleeding gastric/duodenal ulcer (EGD 2/9/22, on protonix) presents for potential DDRT. Makes minimal urine. Last HD: 4/20 (nephrologist, Dr. Barrientos). He is now s/p DCD DDRT with thymo induction and STENT placement.     [] s/p DDRT w/ Thymo Induction- POD2  -DGF: HD 4/22 2/2 hyperkalemia/low UOP  -Miranda, Strict I&Os   -Lasix 80 IV x1    -SCDs  -PRN Pain regimen with Tylenol, Tramadol   -Bowel regimen  -Daily CBC, CMP, Mg/Phos, Coags, Tacro level    [] Immunosuppression   -Envarsus 3, MMF 1g BID, Steroid taper   -Thymoglobulin induction (total 3mg/kg thus far)   -Thymoglobulin 1.5mg/kt + Solumedrol 125mg today   -Daily FK level   -PPX: Valcyte, Nystatin, Bactrim, Pepcid     [] HTN/AFib:  -Continue half home dose Coreg 3.125 BID  -Continue home dose Atorvastatin, Norvasc 10   -Holding home Hydralazine, Telmisartan   -Holding Eliquis in zackery-op period     [] IDDM  -POCT glucose q6h  -Continue mISS  -Was on mISS at home     [] Hx CVA (residual bilateral weakness) c/b Seizure Disorder  -Continue home Depakote and Keppra   -PT/OT consult to assist in ambulation     [] Hypothyroidism  -Continue home Synthroid

## 2022-04-23 NOTE — DIETITIAN INITIAL EVALUATION ADULT - PERTINENT LABORATORY DATA
(04/23) Na 133, BUN 33, Cr 3.72, , Phos 4.8  Finger sticks: (04/23) 140 - 198 (04/22) 144 - 298   A1C with Estimated Average Glucose Result: 7.3 % (02-04-22 @ 13:42)  A1C with Estimated Average Glucose Result: 7.2 % (02-04-22 @ 05:48)  A1C with Estimated Average Glucose Result: 6.5 % (06-27-21 @ 09:34)

## 2022-04-23 NOTE — PROGRESS NOTE ADULT - SUBJECTIVE AND OBJECTIVE BOX
Dr. Barrientos  Office [phone] (9 am to 5 pm)  Service: [phone] (5pm to 9am)  Karolina HAAS      RENAL PROGRESS NOTE: DATE OF SERVICE 04-23-22 @ 12:58    Patient is a 66y old  Male who presents with a chief complaint of ESRD (23 Apr 2022 11:22)      Patient seen and examined at bedside. No chest pain/sob    VITALS:  T(F): 97.9 (04-23-22 @ 08:21), Max: 98.9 (04-22-22 @ 13:15)  HR: 68 (04-23-22 @ 08:21)  BP: 162/74 (04-23-22 @ 08:21)  RR: 20 (04-23-22 @ 08:21)  SpO2: 96% (04-23-22 @ 08:21)  Wt(kg): --    04-22 @ 07:01  -  04-23 @ 07:00  --------------------------------------------------------  IN: 2963.2 mL / OUT: 578 mL / NET: 2385.2 mL    04-23 @ 07:01  -  04-23 @ 12:58  --------------------------------------------------------  IN: 420 mL / OUT: 60 mL / NET: 360 mL          PHYSICAL EXAM:  Constitutional: NAD  Neck: No JVD  Respiratory: CTAB, no wheezes, rales or rhonchi  Cardiovascular: S1, S2, RRR  Gastrointestinal: BS+, soft, NT/ND  Extremities: No peripheral edema    Hospital Medications:   MEDICATIONS  (STANDING):  acetaminophen     Tablet .. 650 milliGRAM(s) Oral once  amLODIPine   Tablet 10 milliGRAM(s) Oral daily  antithymocyte globulin rabbit (peripheral) IVPB w/additives 100 milliGRAM(s) IV Intermittent once  atorvastatin 40 milliGRAM(s) Oral at bedtime  carvedilol 3.125 milliGRAM(s) Oral every 12 hours  dextrose 5%. 1000 milliLiter(s) (50 mL/Hr) IV Continuous <Continuous>  dextrose 5%. 1000 milliLiter(s) (100 mL/Hr) IV Continuous <Continuous>  dextrose 50% Injectable 25 Gram(s) IV Push once  dextrose 50% Injectable 12.5 Gram(s) IV Push once  dextrose 50% Injectable 25 Gram(s) IV Push once  diphenhydrAMINE 25 milliGRAM(s) Oral once  diVALproex  milliGRAM(s) Oral <User Schedule>  diVALproex  milliGRAM(s) Oral <User Schedule>  glucagon  Injectable 1 milliGRAM(s) IntraMuscular once  insulin lispro (ADMELOG) corrective regimen sliding scale   SubCutaneous three times a day before meals  insulin lispro (ADMELOG) corrective regimen sliding scale   SubCutaneous at bedtime  levETIRAcetam 250 milliGRAM(s) Oral two times a day  levothyroxine 50 MICROGram(s) Oral daily  methylPREDNISolone sodium succinate Injectable 125 milliGRAM(s) IV Push once  mycophenolate mofetil 1 Gram(s) Oral <User Schedule>  nystatin    Suspension 220451 Unit(s) Swish and Swallow four times a day  pantoprazole    Tablet 40 milliGRAM(s) Oral before breakfast  polyethylene glycol 3350 17 Gram(s) Oral every 24 hours  senna 2 Tablet(s) Oral at bedtime  tacrolimus ER Tablet (ENVARSUS XR) 3 milliGRAM(s) Oral <User Schedule>  trimethoprim   80 mG/sulfamethoxazole 400 mG 1 Tablet(s) Oral daily  valGANciclovir 450 milliGRAM(s) Oral <User Schedule>    Tacrolimus (), Serum: <2.0 ng/mL (04-23 @ 07:54)  Tacrolimus (), Serum: <2.0 ng/mL (04-22 @ 17:08)    LABS:  04-23    133<L>  |  97  |  33<H>  ----------------------------<  167<H>  4.5   |  20<L>  |  3.72<H>    Ca    8.1<L>      23 Apr 2022 06:12  Phos  4.8     04-23  Mg     1.8     04-23    TPro  5.6<L>  /  Alb  2.4<L>  /  TBili  0.3  /  DBili      /  AST  28  /  ALT  7<L>  /  AlkPhos  83  04-23    Creatinine Trend: 3.72 <--, 6.56 <--, 6.21 <--, 5.62 <--, 5.64 <--, 5.14 <--, 5.08 <--    Albumin, Serum: 2.4 g/dL (04-23 @ 06:12)  Phosphorus Level, Serum: 4.8 mg/dL (04-23 @ 06:12)                              7.8    11.62 )-----------( 114      ( 23 Apr 2022 06:12 )             25.4     Urine Studies:      Iron 55, TIBC 191, %sat 29      [02-04-22 @ 00:04]  Ferritin 1287      [02-04-22 @ 00:04]  PTH -- (Ca 9.2)      [02-05-22 @ 10:13]   294  HbA1c 6.0      [02-21-20 @ 08:53]  Lipid: chol 118, TG 54, HDL 59, LDL --      [06-27-21 @ 09:44]    HBsAb 58007.2      [04-21-22 @ 14:13]  HBsAg Nonreact      [04-21-22 @ 14:13]  HBcAb Reactive      [04-21-22 @ 14:13]  HCV 0.25, Nonreact      [04-21-22 @ 14:13]  HIV Nonreact      [04-21-22 @ 14:47]      RADIOLOGY & ADDITIONAL STUDIES:

## 2022-04-23 NOTE — DIETITIAN INITIAL EVALUATION ADULT - ENERGY INTAKE
Pt S/P kidney transplant recipient (04/21) - reports good appetite and PO intake in house, tolerating well. Reports difficulty chewing - requests soft diet. Denies difficulty swallowing. Pt denies nausea, vomiting, diarrhea, or constipation, last BM (04/20). Urine output as per flow sheets (04/21) 110 ml -> (04/22) 368 ml -> (04/23) 60 ml so far.

## 2022-04-23 NOTE — DIETITIAN INITIAL EVALUATION ADULT - PERSON TAUGHT/METHOD
T2DM and on post transplant nutrition therapy and food safety guidelines for transplant recipients/verbal instruction/written material/teach back - (Patient repeats in own words)/patient instructed
alert/follows commands/CONFUSED

## 2022-04-23 NOTE — DIETITIAN INITIAL EVALUATION ADULT - PERTINENT MEDS FT
MEDICATIONS  (STANDING):  acetaminophen     Tablet .. 650 milliGRAM(s) Oral once  amLODIPine   Tablet 10 milliGRAM(s) Oral daily  antithymocyte globulin rabbit (peripheral) IVPB w/additives 100 milliGRAM(s) IV Intermittent once  atorvastatin 40 milliGRAM(s) Oral at bedtime  carvedilol 3.125 milliGRAM(s) Oral every 12 hours  dextrose 5%. 1000 milliLiter(s) (50 mL/Hr) IV Continuous <Continuous>  dextrose 5%. 1000 milliLiter(s) (100 mL/Hr) IV Continuous <Continuous>  dextrose 50% Injectable 25 Gram(s) IV Push once  dextrose 50% Injectable 12.5 Gram(s) IV Push once  dextrose 50% Injectable 25 Gram(s) IV Push once  diphenhydrAMINE 25 milliGRAM(s) Oral once  diVALproex  milliGRAM(s) Oral <User Schedule>  diVALproex  milliGRAM(s) Oral <User Schedule>  glucagon  Injectable 1 milliGRAM(s) IntraMuscular once  insulin lispro (ADMELOG) corrective regimen sliding scale   SubCutaneous three times a day before meals  insulin lispro (ADMELOG) corrective regimen sliding scale   SubCutaneous at bedtime  levETIRAcetam 250 milliGRAM(s) Oral two times a day  levothyroxine 50 MICROGram(s) Oral daily  methylPREDNISolone sodium succinate Injectable 125 milliGRAM(s) IV Push once  mycophenolate mofetil 1 Gram(s) Oral <User Schedule>  nystatin    Suspension 990620 Unit(s) Swish and Swallow four times a day  pantoprazole    Tablet 40 milliGRAM(s) Oral before breakfast  polyethylene glycol 3350 17 Gram(s) Oral every 24 hours  senna 2 Tablet(s) Oral at bedtime  tacrolimus ER Tablet (ENVARSUS XR) 3 milliGRAM(s) Oral <User Schedule>  trimethoprim   80 mG/sulfamethoxazole 400 mG 1 Tablet(s) Oral daily  valGANciclovir 450 milliGRAM(s) Oral <User Schedule>    MEDICATIONS  (PRN):  chlorhexidine 2% Cloths 1 Application(s) Topical daily PRN cloth  dextrose Oral Gel 15 Gram(s) Oral once PRN Blood Glucose LESS THAN 70 milliGRAM(s)/deciliter  ondansetron Injectable 4 milliGRAM(s) IV Push every 6 hours PRN Nausea and/or Vomiting

## 2022-04-23 NOTE — PROGRESS NOTE ADULT - ASSESSMENT
This is a 66 year old male with history of HTN, HLD, DM (on insulin), ESRD (2/2 DM and HTN) on HD via permacath since 3yrs ago MWF, afib (on eliquis 2.5mg BID), hx of stroke (2019 2/2 afib), focal seizure (on depakote, keppra), hx of CABG (2020), hx of nonbleeding gastric/duodenal ulcer (EGD 2/9/22, on protonix) presents for DDRT. Nephrology consulted for renal failure.     A/P   DDRT on 4/21/22.   Last HD on 4/22/22  transplant team on board   Immunosuppressant as per transplant team    HD consent in the cart   Monitor renal function and urine output closely.     HTN   Fluctuating  monitor BP closely     Anemia:  Monitor Hb

## 2022-04-23 NOTE — DIETITIAN INITIAL EVALUATION ADULT - ETIOLOGY
Increased demands for nutrients for surgical healing  limited prior education on post-transplant nutrition therapy and food safety guidelines

## 2022-04-23 NOTE — DIETITIAN INITIAL EVALUATION ADULT - ADD RECOMMEND
1. Will continue to monitor PO intake, weight, labs, skin, GI status, diet, and urine output as able. 2. Encourage PO intake and honor food preferences. 3. Provided education T2DM and on post transplant nutrition therapy and food safety guidelines for transplant recipients with nutrition package before discharge - made aware RD remains available.

## 2022-04-23 NOTE — DIETITIAN INITIAL EVALUATION ADULT - LITERATURE/VIDEOS GIVEN
Provided nutrition package including: [employer] Food Safety for Transplant Recipients booklet; food safety, T2DM nutrition therapy, and BG control handouts, fridge magnet with cooking temperatures, and RD information card.

## 2022-04-23 NOTE — PROGRESS NOTE ADULT - SUBJECTIVE AND OBJECTIVE BOX
Transplant Surgery - Multidisciplinary Rounds  --------------------------------------------------------------  DDRT Date: 22         POD#2    Present: Patient seen and examined with multidisciplinary team including Transplant Surgeon: Dr. Barber Nephrologist: PHIL Kan  during am rounds.  Disciplines not in attendance will be notified of the plan.    HPI: 66 year old male with history of HTN, HLD, DM (on insulin), ESRD (2/2 DM and HTN) on HD via permacath since 3yrs ago MWF, afib (on eliquis 2.5mg BID), hx of stroke ( afib), focal seizure (on depakote, keppra), hx of CABG (), hx of nonbleeding gastric/duodenal ulcer (EGD 22, on protonix) presents for potential DDRT. Makes minimal urine. Last HD:  (nephrologist, Dr. Barrientos)     -Now s/p DCD DDRT with thymo induction and STENT placement w/ 22.     OR details: DCD DDRT with Thymo induction with stent placement  1 artery, 1 vein, 1 ureter  Cold ischemia time 22 hr      Recipient:  Srinivasan Castelan  /Age:    1955  65 y/o M  Hx:  DM, HTN, CAD s/p Stent (2019), CVA             Covid Vaccine:   Moderna x 3  Nephrologist: Dr. Sneed  CPRA:    0% Luminex dated 3/2/2022  ABO:      B  CMV:  Positive    EBV Status:  Positive  Last HD:  2022  NPO:  Yes  ALLERGIES: NKDA    Covid Testing: Negative done on admission 22  Pt Location-[address] [place] [address] -Treatment room  Financially cleared- Yes  Recipient OR- 22  1500 pm  Surgeon-Dr. Barber       Donor (import): [facility]    Donor ID: [id number]   Match: [id number]  OPO: [facility]  Age:  57 y.o M  ABO:  B  High Risk: Yes (MSM)    KDPI: 82%  DCD: Yes   22min WIT  COD:  CVA ICH  X Clamp Time: 2022 20:20  Medical Hx:  ETOH abuse, polysubstance abuse  Terminal Cr:  1.34  Covid Testing: Negative NP 2022  CMV-Positive      EBV-Positive  HepBcAb-Negative  Hepatitis C-OPAL- Negative  Hepatitis C ab-Negative     MISMATCH: 1,2,2  Kidney ETA to [facility]:  22 1500  Kidney Laterality: Right  X-match –virtual negative    Interval Events:  -POD#2 DCD DDRT with stent  -Postop US: slight increase in flow at iliac a. anastomosis  -DGF: HD yesterday evening due to K-6.0 with low UOP  -ALINA with       -low UOP 90cc postop, Cr 5.62<----5308  -hyperkalemia 5.6 - shifted and now 4.6  -high ALINA output, compression dressing improved    Potential Discharge date: Pending clinical improvement.     Education:  Medications    Plan of care:  See Below  MEDICATIONS  (STANDING):  carvedilol 3.125 milliGRAM(s) Oral every 12 hours  dextrose 5%. 1000 milliLiter(s) (50 mL/Hr) IV Continuous <Continuous>  dextrose 5%. 1000 milliLiter(s) (100 mL/Hr) IV Continuous <Continuous>  dextrose 50% Injectable 25 Gram(s) IV Push once  dextrose 50% Injectable 12.5 Gram(s) IV Push once  dextrose 50% Injectable 25 Gram(s) IV Push once  famotidine    Tablet 20 milliGRAM(s) Oral daily  glucagon  Injectable 1 milliGRAM(s) IntraMuscular once  insulin lispro (ADMELOG) corrective regimen sliding scale   SubCutaneous three times a day before meals  insulin lispro (ADMELOG) corrective regimen sliding scale   SubCutaneous at bedtime  mycophenolate mofetil 1 Gram(s) Oral <User Schedule>  nystatin    Suspension 828209 Unit(s) Swish and Swallow four times a day  sodium chloride 0.45%. 500 milliLiter(s) (50 mL/Hr) IV Continuous <Continuous>  sodium chloride 0.9%. 1000 milliLiter(s) (70 mL/Hr) IV Continuous <Continuous>  trimethoprim   80 mG/sulfamethoxazole 400 mG 1 Tablet(s) Oral daily  valGANciclovir 450 milliGRAM(s) Oral daily    MEDICATIONS  (PRN):  dextrose Oral Gel 15 Gram(s) Oral once PRN Blood Glucose LESS THAN 70 milliGRAM(s)/deciliter  ondansetron Injectable 4 milliGRAM(s) IV Push every 6 hours PRN Nausea and/or Vomiting      PAST MEDICAL & SURGICAL HISTORY:  Hypertension    Diabetes    Dyslipidemia    CAD (Coronary Artery Disease)  with Stents in 2009 and 2019, s/p off-pump C3L on 20    Hypothyroidism    CVA (cerebral vascular accident)  19 with residual bilateral weakness    Anemia    PEG (percutaneous endoscopic gastrostomy) status  removed 2020    Intubation of airway performed without difficulty  Dec 2019  CVA c/b status epilepticus requiring intubation and PEG in 2019-    ESRD on dialysis  M/W/F    Seizures  after CVA, last seizure was last week 22    History of insertion of stent into coronary artery bypass graft   and     S/P CABG x 3  off pump C3L on 20        Vital Signs Last 24 Hrs  T(C): 36.9 (2022 09:00), Max: 36.9 (2022 09:00)  T(F): 98.5 (2022 09:00), Max: 98.5 (2022 09:00)  HR: 59 (2022 09:25) (53 - 75)  BP: 152/70 (2022 09:25) (121/58 - 196/92)  BP(mean): 99 (2022 07:00) (83 - 119)  RR: 18 (2022 09:00) (14 - 20)  SpO2: 100% (2022 09:00) (93% - 100%)    I&O's Summary    2022 07:01  -  2022 07:00  --------------------------------------------------------  IN: 1217.6 mL / OUT: 665 mL / NET: 552.6 mL    2022 07:01  -  2022 09:50  --------------------------------------------------------  IN: 140 mL / OUT: 25 mL / NET: 115 mL                              9.1    15.54 )-----------( 146      ( 2022 03:40 )             30.7     -    138  |  101  |  49<H>  ----------------------------<  235<H>  4.6   |  18<L>  |  5.62<H>    Ca    8.2<L>      2022 05:43  Phos  4.7       Mg     2.1         TPro  6.1  /  Alb  2.7<L>  /  TBili  0.3  /  DBili  x   /  AST  44<H>  /  ALT  11  /  AlkPhos  91          Review of systems  Gen: No weight changes, fatigue, fevers/chills, weakness  Skin: No rashes  Head/Eyes/Ears/Mouth: No headache; Normal hearing; Normal vision w/o blurriness; No sinus pain/discomfort, sore throat  Respiratory: No dyspnea, cough, wheezing, hemoptysis  CV: No chest pain, PND, orthopnea  GI: C/O mild abdominal pain at surgical site. no diarrhea, constipation, nausea, vomiting, melena, hematochezia  : No increased frequency, dysuria, hematuria, nocturia  MSK: No joint pain/swelling; no back pain; no edema  Neuro: No dizziness/lightheadedness, weakness, seizures, numbness, tingling  Heme: No easy bruising or bleeding  Endo: No heat/cold intolerance  Psych: No significant nervousness, anxiety, stress, depression  All other systems were reviewed and are negative, except as noted.      PHYSICAL EXAM:  Constitutional: Well developed / well nourished  Eyes: PERRLA  ENMT: nc/at, no thrush  Neck: supple, central line  Respiratory: CTA B/L  Cardiovascular: RRR  Gastrointestinal: Soft abdomen, ND, appropriate incisional TTP, c/d/i, ALINA serosanguinous output  Genitourinary: Urinary catheter in place  Extremities: SCD's in place and working bilaterally  Vascular: Palpable dp pulses bilaterally.   Neurological: A&O x3  Skin: no rashes, ulcerations, lesions  Musculoskeletal: Moving all extremities  Psychiatric: Responsive Transplant Surgery - Multidisciplinary Rounds  --------------------------------------------------------------  DDRT Date: 22         POD#2    Present: Patient seen and examined with multidisciplinary team including Transplant Surgeon: Dr. Barber Nephrologist: PHIL Kan  during am rounds.  Disciplines not in attendance will be notified of the plan.    HPI: 66 year old male with history of HTN, HLD, DM (on insulin), ESRD (2/2 DM and HTN) on HD via permacath since 3yrs ago MWF, afib (on eliquis 2.5mg BID), hx of stroke ( afib), focal seizure (on depakote, keppra), hx of CABG (), hx of nonbleeding gastric/duodenal ulcer (EGD 22, on protonix) presents for potential DDRT. Makes minimal urine. Last HD:  (nephrologist, Dr. Barrientos)     -Now s/p DCD DDRT with thymo induction and STENT placement w/ 22.     OR details: DCD DDRT with Thymo induction with stent placement  1 artery, 1 vein, 1 ureter  Cold ischemia time 22 hr      Recipient:  Srinivasan Castelan  /Age:    1955  65 y/o M  Hx:  DM, HTN, CAD s/p Stent (2019), CVA             Covid Vaccine:   Moderna x 3  Nephrologist: Dr. Sneed  CPRA:    0% Luminex dated 3/2/2022  ABO:      B  CMV:  Positive    EBV Status:  Positive  Last HD:  2022  NPO:  Yes  ALLERGIES: NKDA    Covid Testing: Negative done on admission 22  Pt Location-[address] [place] [address] -Treatment room  Financially cleared- Yes  Recipient OR- 22  1500 pm  Surgeon-Dr. Barber       Donor [facility]import): [facility]    Donor ID: [id number]   Match: [id number]  OPO: [facility]  Age:  57 y.o M  ABO:  B  High Risk: Yes (MSM)    KDPI: 82%  DCD: Yes   22min WIT  COD:  CVA ICH  X Clamp Time: 2022 20:20  Medical Hx:  ETOH abuse, polysubstance abuse  Terminal Cr:  1.34  Covid Testing: Negative NP 2022  CMV-Positive      EBV-Positive  HepBcAb-Negative  Hepatitis C-OPAL- Negative  Hepatitis C ab-Negative     MISMATCH: 1,2,2  Kidney ETA to [facility]:  22 1500  Kidney Laterality: Right  X-match –virtual negative    Interval Events:  -POD#2 DCD DDRT with stent  -Postop US: slight increase in flow at iliac a. anastomosis  -DGF: HD yesterday evening due to K-6.0 with low UOP  -ALINA with 210ml/24hrs serosang output   -Received Thymo 100/Solu 250 yesterday                      Induction: Thymoglobulin  Immunosuppression: Env by level, MMF 1g BID, Roid taper     Potential Discharge date: Pending clinical improvement.   Education:  Medications  Plan of care:  See Below    MEDICATIONS  (STANDING):  acetaminophen     Tablet .. 650 milliGRAM(s) Oral once  amLODIPine   Tablet 10 milliGRAM(s) Oral daily  antithymocyte globulin rabbit (peripheral) IVPB w/additives 100 milliGRAM(s) IV Intermittent once  atorvastatin 40 milliGRAM(s) Oral at bedtime  carvedilol 3.125 milliGRAM(s) Oral every 12 hours  dextrose 5%. 1000 milliLiter(s) (50 mL/Hr) IV Continuous <Continuous>  dextrose 5%. 1000 milliLiter(s) (100 mL/Hr) IV Continuous <Continuous>  dextrose 50% Injectable 25 Gram(s) IV Push once  dextrose 50% Injectable 12.5 Gram(s) IV Push once  dextrose 50% Injectable 25 Gram(s) IV Push once  diphenhydrAMINE 25 milliGRAM(s) Oral once  diVALproex  milliGRAM(s) Oral <User Schedule>  diVALproex  milliGRAM(s) Oral <User Schedule>  glucagon  Injectable 1 milliGRAM(s) IntraMuscular once  insulin lispro (ADMELOG) corrective regimen sliding scale   SubCutaneous three times a day before meals  insulin lispro (ADMELOG) corrective regimen sliding scale   SubCutaneous at bedtime  levETIRAcetam 250 milliGRAM(s) Oral two times a day  levothyroxine 50 MICROGram(s) Oral daily  methylPREDNISolone sodium succinate Injectable 125 milliGRAM(s) IV Push once  mycophenolate mofetil 1 Gram(s) Oral <User Schedule>  nystatin    Suspension 245842 Unit(s) Swish and Swallow four times a day  pantoprazole    Tablet 40 milliGRAM(s) Oral before breakfast  polyethylene glycol 3350 17 Gram(s) Oral every 24 hours  senna 2 Tablet(s) Oral at bedtime  tacrolimus ER Tablet (ENVARSUS XR) 3 milliGRAM(s) Oral <User Schedule>  trimethoprim   80 mG/sulfamethoxazole 400 mG 1 Tablet(s) Oral daily  valGANciclovir 450 milliGRAM(s) Oral <User Schedule>      MEDICATIONS  (PRN):  chlorhexidine 2% Cloths 1 Application(s) Topical daily PRN cloth  dextrose Oral Gel 15 Gram(s) Oral once PRN Blood Glucose LESS THAN 70 milliGRAM(s)/deciliter  ondansetron Injectable 4 milliGRAM(s) IV Push every 6 hours PRN Nausea and/or Vomiting      PAST MEDICAL & SURGICAL HISTORY:  Hypertension  Diabetes  Dyslipidemia  CAD (Coronary Artery Disease)-with Stents in 2009 and 2019, s/p off-pump C3L on 20  Hypothyroidism  CVA (cerebral vascular accident)-19 with residual bilateral weakness  Anemia  PEG (percutaneous endoscopic gastrostomy) status-removed 2020  Intubation of airway performed without difficulty-Dec 2019  CVA c/b status epilepticus requiring intubation and PEG in 2019-  ESRD on dialysis-M/W/F  Seizures-after CVA, last seizure was last week 22  History of insertion of stent into coronary artery bypass graft- and   S/P CABG x 3-off pump C3L on 20      Vital Signs Last 24 Hrs  T(C): 36.6 (2022 08:21), Max: 37.2 (2022 11:50)  T(F): 97.9 (2022 08:21), Max: 98.9 (2022 11:50)  HR: 68 (2022 08:21) (56 - 68)  BP: 162/74 (2022 08:21) (121/56 - 177/79)  BP(mean): --  RR: 20 (2022 08:21) (18 - 20)  SpO2: 96% (2022 08:21) (96% - 100%)      I&O's Summary    2022 07:01  -  2022 07:00  --------------------------------------------------------  IN: 2963.2 mL / OUT: 578 mL / NET: 2385.2 mL    2022 07:01  -  2022 11:47  --------------------------------------------------------  IN: 420 mL / OUT: 50 mL / NET: 370 mL        Labs             7.8    11.62 )-----------( 114      ( 2022 06:12 )             25.4     04-23    133<L>  |  97  |  33<H>  ----------------------------<  167<H>  4.5   |  20<L>  |  3.72<H>    Ca    8.1<L>      2022 06:12  Phos  4.8     04-23  Mg     1.8     04-23    TPro  5.6<L>  /  Alb  2.4<L>  /  TBili  0.3  /  DBili  x   /  AST  28  /  ALT  7<L>  /  AlkPhos  83  04-23    CAPILLARY BLOOD GLUCOSE  POCT Blood Glucose.: 198 mg/dL (2022 08:52)  POCT Blood Glucose.: 197 mg/dL (2022 06:16)  POCT Blood Glucose.: 140 mg/dL (2022 02:05)  POCT Blood Glucose.: 251 mg/dL (2022 21:12)  POCT Blood Glucose.: 298 mg/dL (2022 17:05)  POCT Blood Glucose.: 223 mg/dL (2022 16:29)  POCT Blood Glucose.: 219 mg/dL (2022 13:23)      COVID-19 PCR: NotDetec (2022 09:57)  COVID-19 PCR: NotDetec (15 Apr 2022 12:19)  COVID-19 PCR: NotDetec (2022 17:57)  COVID-19 PCR: NotDetec (2022 18:59)      Review of systems  Gen: No weight changes, fatigue, fevers/chills, weakness  Skin: No rashes  Head/Eyes/Ears/Mouth: No headache; Normal hearing; Normal vision w/o blurriness; No sinus pain/discomfort, sore throat  Respiratory: No dyspnea, cough, wheezing, hemoptysis  CV: No chest pain, PND, orthopnea  GI: C/O mild abdominal pain at surgical site. no diarrhea, constipation, nausea, vomiting, melena, hematochezia  : No increased frequency, dysuria, hematuria, nocturia  MSK: No joint pain/swelling; no back pain; no edema  Neuro: No dizziness/lightheadedness, weakness, seizures, numbness, tingling  Heme: No easy bruising or bleeding  Endo: No heat/cold intolerance  Psych: No significant nervousness, anxiety, stress, depression  All other systems were reviewed and are negative, except as noted.      PHYSICAL EXAM:  Constitutional: Well developed / well nourished  Eyes: PERRLA  ENMT: nc/at, no thrush  Neck: supple, central line  Respiratory: CTA B/L  Cardiovascular: RRR  Gastrointestinal: Soft abdomen, ND, appropriate incisional TTP, c/d/i, ALINA serosanguinous output  Genitourinary: Urinary catheter in place  Extremities: SCD's in place and working bilaterally  Vascular: Palpable dp pulses bilaterally.   Neurological: A&O x3  Skin: no rashes, ulcerations, lesions  Musculoskeletal: Moving all extremities  Psychiatric: Responsive

## 2022-04-23 NOTE — DIETITIAN INITIAL EVALUATION ADULT - ORAL INTAKE PTA/DIET HISTORY
Pt reports good appetite and PO intake at home. Confirms NKFA. Denies taking any vitamins/minerals PTA; states drinking Nepro 1xday. Reports following a renal diet at home, seeing a dietitian at dialysis center. Reports monitoring BG 3xday with ranges  mg/dl in the morning and 117 - 160 mg/dl in the night and states taking Novolog at home; HbA1c (02/04) 7.3% - suggests fair BG control.

## 2022-04-23 NOTE — DIETITIAN INITIAL EVALUATION ADULT - REASON FOR ADMISSION
Pt 65 y/o M with PMH as per chart: HTN, HLD, DM (on insulin), ESRD (2/2 DM and HTN) on HD, Afib, hx of stroke (2019 2/2 Afib), focal seizure, hx of CABG (2020), hx of nonbleeding gastric/duodenal ulcer (EGD 2/9/22, on Protonix), admitted for kidney transplant, S/P DDRT (04/21), with delayed functioning allograft, HD (04/22) 2/2 hyperkalemia/low UOP.

## 2022-04-23 NOTE — DIETITIAN INITIAL EVALUATION ADULT - OTHER INFO
Pt denies weight changes PTA, reports UBW ~62 kg (136.4 pounds). Weight as per previous RD note (02/07/2022) 134.7 pounds. Weight as per flow sheets (04/21) 135 pounds -> (04/23) 162.9 pounds -?accuracy of weight fluctuation as pt with edema and S/P DDRT.     Provided education T2DM and on post transplant nutrition therapy and food safety guidelines for transplant recipients. Discussed importance of thoroughly washing all fresh fruits/vegetables, importance of avoiding uncooked/raw/unpasteurized foods, avoiding pre-made deli/buffet/salad bar meals. Foods recommended as healthy well balanced diet and importance of adequate protein intakes for proper post-surgical healing discussed. Reviewed recommendations to avoid grapefruit, pomegranate and star fruit while taking immunosuppressant medication. Reviewed recommendations for moderate intake of sodium and carbohydrates with transplant medications. Reviewed effect of steroids on BG levels and importance of limiting concentrated sweets. Reviewed foods containing carbohydrates, foods containing proteins, and portion sizes. Stressed the importance of a balanced meal to maintain blood glucose. Encouraged vegetables consumption. Described HbA1c and stressed importance of its normal levels. Encouraged Pt to continue monitoring blood glucose at home. Recommended water consumption with avoidance of soda and juice. Pt was receptive and expressed understanding. All questions answered. Provided nutrition package including: USDA Food Safety for Transplant Recipients booklet; food safety, T2DM nutrition therapy, and BG control handouts, fridge magnet with cooking temperatures,  and RD information card.

## 2022-04-23 NOTE — PROGRESS NOTE ADULT - SUBJECTIVE AND OBJECTIVE BOX
--------------------------------------------------------------------------------  Chief Complaint: no acute symptoms    24 hour events/subjective:  Reviewed      PAST HISTORY  --------------------------------------------------------------------------------  No significant changes to PMH, PSH, FHx, SHx, unless otherwise noted    ALLERGIES & MEDICATIONS  --------------------------------------------------------------------------------  Allergies    No Known Allergies    Intolerances      Standing Inpatient Medications  acetaminophen     Tablet .. 650 milliGRAM(s) Oral once  amLODIPine   Tablet 10 milliGRAM(s) Oral daily  antithymocyte globulin rabbit (peripheral) IVPB w/additives 100 milliGRAM(s) IV Intermittent once  atorvastatin 40 milliGRAM(s) Oral at bedtime  carvedilol 3.125 milliGRAM(s) Oral every 12 hours  dextrose 5%. 1000 milliLiter(s) IV Continuous <Continuous>  dextrose 5%. 1000 milliLiter(s) IV Continuous <Continuous>  dextrose 50% Injectable 25 Gram(s) IV Push once  dextrose 50% Injectable 12.5 Gram(s) IV Push once  dextrose 50% Injectable 25 Gram(s) IV Push once  diphenhydrAMINE 25 milliGRAM(s) Oral once  diVALproex  milliGRAM(s) Oral <User Schedule>  diVALproex  milliGRAM(s) Oral <User Schedule>  glucagon  Injectable 1 milliGRAM(s) IntraMuscular once  insulin lispro (ADMELOG) corrective regimen sliding scale   SubCutaneous three times a day before meals  insulin lispro (ADMELOG) corrective regimen sliding scale   SubCutaneous at bedtime  levETIRAcetam 250 milliGRAM(s) Oral two times a day  levothyroxine 50 MICROGram(s) Oral daily  methylPREDNISolone sodium succinate Injectable 125 milliGRAM(s) IV Push once  mycophenolate mofetil 1 Gram(s) Oral <User Schedule>  nystatin    Suspension 901535 Unit(s) Swish and Swallow four times a day  pantoprazole    Tablet 40 milliGRAM(s) Oral before breakfast  polyethylene glycol 3350 17 Gram(s) Oral every 24 hours  senna 2 Tablet(s) Oral at bedtime  tacrolimus ER Tablet (ENVARSUS XR) 3 milliGRAM(s) Oral <User Schedule>  trimethoprim   80 mG/sulfamethoxazole 400 mG 1 Tablet(s) Oral daily  valGANciclovir 450 milliGRAM(s) Oral <User Schedule>    PRN Inpatient Medications  chlorhexidine 2% Cloths 1 Application(s) Topical daily PRN  dextrose Oral Gel 15 Gram(s) Oral once PRN  ondansetron Injectable 4 milliGRAM(s) IV Push every 6 hours PRN      REVIEW OF SYSTEMS  --------------------------------------------------------------------------------  Gen: No fevers/chills  Skin: No rashes  Head/Eyes/Ears/Mouth: No headache; No sore throat  Respiratory: No dyspnea, cough,   CV: No chest pain, PND, orthopnea  GI: controlled post op pain no nausea, vomiting  Transplant: No pain  : No increased frequency, dysuria, hematuria, nocturia  MSK: No joint pain/swelling; no back pain; no edema  Neuro: No dizziness/lightheadedness, weakness, seizures, numbness, tingling  Psych: No significant nervousness, anxiety, stress, depression    All other systems were reviewed and are negative, except as noted.    VITALS/PHYSICAL EXAM  --------------------------------------------------------------------------------  T(C): 36.6 (04-23-22 @ 08:21), Max: 37.2 (04-22-22 @ 11:50)  HR: 68 (04-23-22 @ 08:21) (56 - 68)  BP: 162/74 (04-23-22 @ 08:21) (121/56 - 177/79)  RR: 20 (04-23-22 @ 08:21) (18 - 20)  SpO2: 96% (04-23-22 @ 08:21) (96% - 100%)     Height (cm): 177.8 (04-21-22 @ 15:57)  Weight (kg): 61.6 (04-21-22 @ 15:57)  BMI (kg/m2): 19.5 (04-21-22 @ 15:57)  BSA (m2): 1.77 (04-21-22 @ 15:57)      04-22-22 @ 07:01  -  04-23-22 @ 07:00  --------------------------------------------------------  IN: 2963.2 mL / OUT: 578 mL / NET: 2385.2 mL    04-23-22 @ 07:01  -  04-23-22 @ 11:22  --------------------------------------------------------  IN: 240 mL / OUT: 50 mL / NET: 190 mL      Physical Exam:  	Gen: NAD, well-appearing  	HEENT: no acute signs  	Pulm: CTA B/L  	CV: RRR, S1S2; no rub  	Abd: +BS, soft,  nondistended                      Transplant: healing incision  	: Miranda  	UE: no acute signs  	LE: no edema  	Neuro: No asterixis, speech: normal  	Psych: Normal affect and mood  	Skin: Warm, without rashes      LABS/STUDIES  --------------------------------------------------------------------------------              7.8    11.62 >-----------<  114      [04-23-22 @ 06:12]              25.4     133  |  97  |  33  ----------------------------<  167      [04-23-22 @ 06:12]  4.5   |  20  |  3.72        Ca     8.1     [04-23-22 @ 06:12]      Mg     1.8     [04-23-22 @ 06:12]      Phos  4.8     [04-23-22 @ 06:12]    TPro  5.6  /  Alb  2.4  /  TBili  0.3  /  DBili  x   /  AST  28  /  ALT  7   /  AlkPhos  83  [04-23-22 @ 06:12]          Creatinine Trend:  SCr 3.72 [04-23 @ 06:12]  SCr 6.56 [04-22 @ 20:34]  SCr 6.21 [04-22 @ 14:58]  SCr 5.62 [04-22 @ 05:43]  SCr 5.64 [04-22 @ 03:41]    Tacrolimus (), Serum: <2.0 ng/mL (04-22 @ 17:08)           Iron 55, TIBC 191, %sat 29      [02-04-22 @ 00:04]  Ferritin 1287      [02-04-22 @ 00:04]  PTH -- (Ca 9.2)      [02-05-22 @ 10:13]   294  HbA1c 6.0      [02-21-20 @ 08:53]  Lipid: chol 118, TG 54, HDL 59, LDL --      [06-27-21 @ 09:44]    HBsAb 82368.2      [04-21-22 @ 14:13]  HBsAg Nonreact      [04-21-22 @ 14:13]  HBcAb Reactive      [04-21-22 @ 14:13]  HCV 0.25, Nonreact      [04-21-22 @ 14:13]  HIV Nonreact      [04-21-22 @ 14:47]      < from: US Trans Kidney w/ Doppler, Right (04.22.22 @ 01:16) >    IMPRESSION:    Well-perfused right lower quadrant renal transplant.    Mildly elevated velocities in the transplant renal artery and iliac   anastomosis, may be related to postoperative edema. Continued follow-up   is recommended.      < end of copied text >

## 2022-04-23 NOTE — DIETITIAN INITIAL EVALUATION ADULT - REASON INDICATOR FOR ASSESSMENT
Pt seen for post kidney transplant recipient nutrition evaluation per department protocol.   Information obtained from: medical record, previous RD note, and pt.  Pt William-Speaking per chart - refused translation services, able to understand and speak English.

## 2022-04-24 NOTE — OCCUPATIONAL THERAPY INITIAL EVALUATION ADULT - ANTICIPATED DISCHARGE DISPOSITION, OT EVAL
Progress to discharge home with assist from family for all functional mobility and ADL/IADL performance; Home Occupational Therapy to assess safety and functional mobility within home during self care tasks/home w/ level of assist/home w/ OT

## 2022-04-24 NOTE — OCCUPATIONAL THERAPY INITIAL EVALUATION ADULT - TRANSFER TRAINING, PT EVAL
Goal: Pt will perform sit to stand, bed <> chair, toilet, tub and shower transfers independently with appropriate AD  within 2 weeks

## 2022-04-24 NOTE — PHYSICAL THERAPY INITIAL EVALUATION ADULT - TRANSFER TRAINING, PT EVAL
GOAL: Pt will perform ALL transfers with (S), w/use of appropriate assistive device as needed, in 4 weeks.

## 2022-04-24 NOTE — PROGRESS NOTE ADULT - SUBJECTIVE AND OBJECTIVE BOX
Transplant Surgery - Multidisciplinary Rounds  --------------------------------------------------------------  DDRT Date: 22         POD#3    Present: Patient seen and examined with multidisciplinary team including Transplant Surgeon: Dr. Barber Nephrologist: PHIL Kan  during am rounds.  Disciplines not in attendance will be notified of the plan.    HPI: 66 year old male with history of HTN, HLD, DM (on insulin), ESRD (2/2 DM and HTN) on HD via permacath since 3yrs ago MWF, afib (on eliquis 2.5mg BID), hx of stroke ( afib), focal seizure (on depakote, keppra), hx of CABG (), hx of nonbleeding gastric/duodenal ulcer (EGD 22, on protonix) presents for potential DDRT. Makes minimal urine. Last HD:  (nephrologist, Dr. Barrientos)     -Now s/p DCD DDRT with thymo induction and STENT placement w/ 22.     OR details: DCD DDRT with Thymo induction with stent placement  1 artery, 1 vein, 1 ureter  Cold ischemia time 22 hr      Recipient:  Srinivasan Castelan  /Age:    1955  67 y/o M  Hx:  DM, HTN, CAD s/p Stent (2019), CVA             Covid Vaccine:   Moderna x 3  Nephrologist: Dr. Sneed  CPRA:    0% Luminex dated 3/2/2022  ABO:      B  CMV:  Positive    EBV Status:  Positive  Last HD:  2022  NPO:  Yes  ALLERGIES: NKDA    Covid Testing: Negative done on admission 22  Pt Location-[address] [place] [address] -Treatment room  Financially cleared- Yes  Recipient OR- 22  1500 pm  Surgeon-Dr. Barber       Donor [facility]import): [facility]    Donor ID: [id number]   Match: [id number]  OPO: [facility]  Age:  57 y.o M  ABO:  B  High Risk: Yes (MSM)    KDPI: 82%  DCD: Yes   22min WIT  COD:  CVA ICH  X Clamp Time: 2022 20:20  Medical Hx:  ETOH abuse, polysubstance abuse  Terminal Cr:  1.34  Covid Testing: Negative NP 2022  CMV-Positive      EBV-Positive  HepBcAb-Negative  Hepatitis C-OPAL- Negative  Hepatitis C ab-Negative     MISMATCH: 1,2,2  Kidney ETA to [facility]:  22 1500  Kidney Laterality: Right  X-match –virtual negative    Interval Events:  -POD#3 DCD DDRT with stent  -Postop US: slight increase in flow at iliac a. anastomosis  -DGF: HD  evening due to K-6.0 with low UOP  -UOP picked up overnight up to 40ml/hr from 10ml/hr during day shift  -ALINA with 140ml/24hrs serosang output   -Received Thymo 100/Solu 125, tolerated well                Immunosuppression:         Induction: Thymoglobulin (total 4.5mg/kg thus far)   Immunosuppression: Env by level, MMF 1g BID, Roid taper   Ongoing monitoring for signs of rejection     Potential Discharge date: Pending clinical improvement.   Education:  Medications  Plan of care:  See Below    MEDICATIONS  (STANDING):  acetaminophen     Tablet .. 650 milliGRAM(s) Oral once  amLODIPine   Tablet 10 milliGRAM(s) Oral daily  antithymocyte globulin rabbit (peripheral) IVPB w/additives 100 milliGRAM(s) IV Intermittent once  atorvastatin 40 milliGRAM(s) Oral at bedtime  calcium acetate 667 milliGRAM(s) Oral three times a day with meals  carvedilol 3.125 milliGRAM(s) Oral every 12 hours  dextrose 5%. 1000 milliLiter(s) (100 mL/Hr) IV Continuous <Continuous>  dextrose 5%. 1000 milliLiter(s) (50 mL/Hr) IV Continuous <Continuous>  dextrose 50% Injectable 25 Gram(s) IV Push once  dextrose 50% Injectable 12.5 Gram(s) IV Push once  dextrose 50% Injectable 25 Gram(s) IV Push once  diphenhydrAMINE 25 milliGRAM(s) Oral once  diVALproex  milliGRAM(s) Oral <User Schedule>  diVALproex  milliGRAM(s) Oral <User Schedule>  glucagon  Injectable 1 milliGRAM(s) IntraMuscular once  insulin lispro (ADMELOG) corrective regimen sliding scale   SubCutaneous three times a day before meals  insulin lispro (ADMELOG) corrective regimen sliding scale   SubCutaneous at bedtime  insulin lispro Injectable (ADMELOG) 2 Unit(s) SubCutaneous three times a day before meals  levETIRAcetam 250 milliGRAM(s) Oral two times a day  levothyroxine 50 MICROGram(s) Oral daily  melatonin 5 milliGRAM(s) Oral at bedtime  methylPREDNISolone sodium succinate Injectable 60 milliGRAM(s) IV Push once  mycophenolate mofetil 1 Gram(s) Oral <User Schedule>  nystatin    Suspension 999805 Unit(s) Swish and Swallow four times a day  pantoprazole    Tablet 40 milliGRAM(s) Oral before breakfast  polyethylene glycol 3350 17 Gram(s) Oral every 24 hours  senna 2 Tablet(s) Oral at bedtime  sodium bicarbonate 1300 milliGRAM(s) Oral every 12 hours  tacrolimus ER Tablet (ENVARSUS XR) 5 milliGRAM(s) Oral <User Schedule>  trimethoprim   80 mG/sulfamethoxazole 400 mG 1 Tablet(s) Oral daily  valGANciclovir 450 milliGRAM(s) Oral <User Schedule>      MEDICATIONS  (PRN):  chlorhexidine 2% Cloths 1 Application(s) Topical daily PRN cloth  dextrose Oral Gel 15 Gram(s) Oral once PRN Blood Glucose LESS THAN 70 milliGRAM(s)/deciliter  ondansetron Injectable 4 milliGRAM(s) IV Push every 6 hours PRN Nausea and/or Vomiting      PAST MEDICAL & SURGICAL HISTORY:  Hypertension  Diabetes  Dyslipidemia  CAD (Coronary Artery Disease)-with Stents in 2009 and 2019, s/p off-pump C3L on 20  Hypothyroidism  CVA (cerebral vascular accident)-19 with residual bilateral weakness  Anemia  PEG (percutaneous endoscopic gastrostomy) status-removed 2020  Intubation of airway performed without difficulty-Dec 2019  CVA c/b status epilepticus requiring intubation and PEG in 2019-  ESRD on dialysis-M/W/F  Seizures-after CVA, last seizure was last week 22  History of insertion of stent into coronary artery bypass graft- and   S/P CABG x 3-off pump C3L on 20      Vital Signs Last 24 Hrs  T(C): 36.6 (2022 08:33), Max: 36.7 (2022 17:30)  T(F): 97.8 (2022 08:33), Max: 98 (2022 17:30)  HR: 56 (2022 08:33) (56 - 69)  BP: 137/70 (2022 08:33) (110/58 - 161/82)  BP(mean): 99 (2022 18:06) (81 - 99)  RR: 20 (2022 08:33) (18 - 20)  SpO2: 100% (2022 08:33) (94% - 100%)      I&O's Summary  2022 07:01  -  2022 07:00  --------------------------------------------------------  IN:    IV PiggyBack: 521.4 mL    Oral Fluid: 1260 mL  Total IN: 1781.4 mL    OUT:    Bulb (mL): 140 mL    Indwelling Catheter - Urethral (mL): 480 mL  Total OUT: 620 mL    Total NET: 1161.4 mL      2022 07:01  -  2022 11:42  --------------------------------------------------------  IN:    Oral Fluid: 420 mL  Total IN: 420 mL    OUT:    Indwelling Catheter - Urethral (mL): 110 mL  Total OUT: 110 mL    Total NET: 310 mL            Labs                        8.6    9.47  )-----------( 117      ( 2022 06:21 )             28.5     04-24    129<L>  |  92<L>  |  74<H>  ----------------------------<  262<H>  5.5<H>   |  18<L>  |  5.07<H>    Ca    7.6<L>      2022 06:18  Phos  6.2     04-24  Mg     2.2     -    TPro  5.8<L>  /  Alb  2.7<L>  /  TBili  0.2  /  DBili  x   /  AST  22  /  ALT  6<L>  /  AlkPhos  87      Tacrolimus (), Serum: <2.0:    CAPILLARY BLOOD GLUCOSE  POCT Blood Glucose.: 276 mg/dL (2022 08:59)  POCT Blood Glucose.: 236 mg/dL (2022 02:26)  POCT Blood Glucose.: 159 mg/dL (2022 21:11)  POCT Blood Glucose.: 179 mg/dL (2022 17:04)  POCT Blood Glucose.: 239 mg/dL (2022 12:47)      COVID-19 PCR: NotDetec (2022 09:57)  COVID-19 PCR: NotDetec (15 Apr 2022 12:19)  COVID-19 PCR: NotDetec (2022 17:57)  COVID-19 PCR: NotDetec (2022 18:59)      Review of systems  Gen: No weight changes, fatigue, fevers/chills, weakness  Skin: No rashes  Head/Eyes/Ears/Mouth: No headache; Normal hearing; Normal vision w/o blurriness; No sinus pain/discomfort, sore throat  Respiratory: No dyspnea, cough, wheezing, hemoptysis  CV: No chest pain, PND, orthopnea  GI: C/O mild abdominal pain at surgical site. no diarrhea, constipation, nausea, vomiting, melena, hematochezia  : No increased frequency, dysuria, hematuria, nocturia  MSK: No joint pain/swelling; no back pain; no edema  Neuro: No dizziness/lightheadedness, weakness, seizures, numbness, tingling  Heme: No easy bruising or bleeding  Endo: No heat/cold intolerance  Psych: No significant nervousness, anxiety, stress, depression  All other systems were reviewed and are negative, except as noted.      PHYSICAL EXAM:  Constitutional: Well developed / well nourished  Eyes: PERRLA  ENMT: nc/at, no thrush  Neck: supple, central line  Respiratory: CTA B/L  Cardiovascular: RRR  Gastrointestinal: Soft abdomen, ND, appropriate incisional TTP, c/d/i, ALINA serosanguinous output  Genitourinary: Urinary catheter in place  Extremities: SCD's in place and working bilaterally  Vascular: Palpable DP pulses bilaterally.   Neurological: A&O x3  Skin: no rashes, ulcerations, lesions  Musculoskeletal: Moving all extremities  Psychiatric: Responsive

## 2022-04-24 NOTE — OCCUPATIONAL THERAPY INITIAL EVALUATION ADULT - PERTINENT HX OF CURRENT PROBLEM, REHAB EVAL
65yo William speaking M with PMH HTN, HLD, DM (on insulin), ESRD (2/2 DM and HTN) on HD via permacath since 3yrs ago MWF, afib (on eliquis 2.5mg BID), hx of stroke (2019 2/2 afib), focal seizure (on depakote, keppra), hx of CABG (2020), hx of nonbleeding gastric/duodenal ulcer (EGD 2/9/22, on protonix) presents for potential DDRT.

## 2022-04-24 NOTE — PROGRESS NOTE ADULT - SUBJECTIVE AND OBJECTIVE BOX
Dr. Barrientos  Office [phone] (9 am to 5 pm)  Service: [phone] (5pm to 9am)  Karolina HAAS      RENAL PROGRESS NOTE: DATE OF SERVICE 04-24-22 @ 13:00    Patient is a 66y old  Male who presents with a chief complaint of ESRD (24 Apr 2022 11:36)      Patient seen and examined at bedside. No chest pain/sob    VITALS:  T(F): 97.5 (04-24-22 @ 12:23), Max: 98 (04-23-22 @ 17:30)  HR: 69 (04-24-22 @ 12:23)  BP: 122/58 (04-24-22 @ 12:23)  RR: 20 (04-24-22 @ 12:23)  SpO2: 100% (04-24-22 @ 12:23)  Wt(kg): --    04-23 @ 07:01  -  04-24 @ 07:00  --------------------------------------------------------  IN: 1781.4 mL / OUT: 620 mL / NET: 1161.4 mL    04-24 @ 07:01  -  04-24 @ 13:00  --------------------------------------------------------  IN: 420 mL / OUT: 150 mL / NET: 270 mL          PHYSICAL EXAM:  Constitutional: NAD  Neck: No JVD  Respiratory: CTAB, no wheezes, rales or rhonchi  Cardiovascular: S1, S2, RRR  Gastrointestinal: BS+, soft, NT/ND  Extremities: No peripheral edema    Hospital Medications:   MEDICATIONS  (STANDING):  acetaminophen     Tablet .. 650 milliGRAM(s) Oral once  amLODIPine   Tablet 10 milliGRAM(s) Oral daily  antithymocyte globulin rabbit (peripheral) IVPB w/additives 100 milliGRAM(s) IV Intermittent once  atorvastatin 40 milliGRAM(s) Oral at bedtime  calcium acetate 667 milliGRAM(s) Oral three times a day with meals  carvedilol 3.125 milliGRAM(s) Oral every 12 hours  dextrose 5%. 1000 milliLiter(s) (100 mL/Hr) IV Continuous <Continuous>  dextrose 5%. 1000 milliLiter(s) (50 mL/Hr) IV Continuous <Continuous>  dextrose 50% Injectable 25 Gram(s) IV Push once  dextrose 50% Injectable 12.5 Gram(s) IV Push once  dextrose 50% Injectable 25 Gram(s) IV Push once  diphenhydrAMINE 25 milliGRAM(s) Oral once  diVALproex  milliGRAM(s) Oral <User Schedule>  diVALproex  milliGRAM(s) Oral <User Schedule>  glucagon  Injectable 1 milliGRAM(s) IntraMuscular once  insulin lispro (ADMELOG) corrective regimen sliding scale   SubCutaneous three times a day before meals  insulin lispro (ADMELOG) corrective regimen sliding scale   SubCutaneous at bedtime  insulin lispro Injectable (ADMELOG) 2 Unit(s) SubCutaneous three times a day before meals  levETIRAcetam 250 milliGRAM(s) Oral two times a day  levothyroxine 50 MICROGram(s) Oral daily  melatonin 5 milliGRAM(s) Oral at bedtime  methylPREDNISolone sodium succinate Injectable 60 milliGRAM(s) IV Push once  mycophenolate mofetil 1 Gram(s) Oral <User Schedule>  nystatin    Suspension 056349 Unit(s) Swish and Swallow four times a day  pantoprazole    Tablet 40 milliGRAM(s) Oral before breakfast  polyethylene glycol 3350 17 Gram(s) Oral every 24 hours  senna 2 Tablet(s) Oral at bedtime  sodium bicarbonate 1300 milliGRAM(s) Oral every 12 hours  tacrolimus ER Tablet (ENVARSUS XR) 5 milliGRAM(s) Oral <User Schedule>  trimethoprim   80 mG/sulfamethoxazole 400 mG 1 Tablet(s) Oral daily  valGANciclovir 450 milliGRAM(s) Oral <User Schedule>      LABS:  04-24    129<L>  |  92<L>  |  74<H>  ----------------------------<  262<H>  5.5<H>   |  18<L>  |  5.07<H>    Ca    7.6<L>      24 Apr 2022 06:18  Phos  6.2     04-24  Mg     2.2     04-24    TPro  5.8<L>  /  Alb  2.7<L>  /  TBili  0.2  /  DBili      /  AST  22  /  ALT  6<L>  /  AlkPhos  87  04-24    Creatinine Trend: 5.07 <--, 3.72 <--, 6.56 <--, 6.21 <--, 5.62 <--, 5.64 <--, 5.14 <--, 5.08 <--    Albumin, Serum: 2.7 g/dL (04-24 @ 06:18)  Phosphorus Level, Serum: 6.2 mg/dL (04-24 @ 06:18)                              8.6    9.47  )-----------( 117      ( 24 Apr 2022 06:21 )             28.5     Urine Studies:      Iron 55, TIBC 191, %sat 29      [02-04-22 @ 00:04]  Ferritin 1287      [02-04-22 @ 00:04]  PTH -- (Ca 9.2)      [02-05-22 @ 10:13]   294  HbA1c 6.0      [02-21-20 @ 08:53]  Lipid: chol 118, TG 54, HDL 59, LDL --      [06-27-21 @ 09:44]    HBsAb 82110.2      [04-21-22 @ 14:13]  HBsAg Nonreact      [04-21-22 @ 14:13]  HBcAb Reactive      [04-21-22 @ 14:13]  HCV 0.25, Nonreact      [04-21-22 @ 14:13]  HIV Nonreact      [04-21-22 @ 14:47]      RADIOLOGY & ADDITIONAL STUDIES:

## 2022-04-24 NOTE — PHYSICAL THERAPY INITIAL EVALUATION ADULT - PLANNED THERAPY INTERVENTIONS, PT EVAL
GOAL: Pt will ascend/descend (4) steps with CG Ax1 +U HR and step over step pattern in 4 weeks./balance training/bed mobility training/gait training/transfer training

## 2022-04-24 NOTE — DISCHARGE NOTE PROVIDER - NSDCFUSCHEDAPPT_GEN_ALL_CORE_FT
CHAD DUNBAR ; 04/28/2022 ; NPP Card[facility] [id number] Eduardo Barber  [facility]  S[address] [facility]  Scheduled Appointment: 05/02/2022

## 2022-04-24 NOTE — DISCHARGE NOTE PROVIDER - NSDCFUADDAPPT_GEN_ALL_CORE_FT
FOLLOW-UP:  1 Labs on Friday 4/29   2 Follow up in Transplant with Dr. Barber in clinic Monday 5/2  3 Please follow up with your primary care physician in one week regarding your hospitalization.

## 2022-04-24 NOTE — PROGRESS NOTE ADULT - SUBJECTIVE AND OBJECTIVE BOX
Patient is a 66y old  Male who presents with a chief complaint of ESRD (2022 13:27)      HPI:  Hyperkalemic earler, No new symptoms. Feels better  S/p DDRT on 2022        PAST MEDICAL & SURGICAL HISTORY:  Hypertension    Diabetes    Dyslipidemia    CAD (Coronary Artery Disease)  with Stents in 2009 and 2019, s/p off-pump C3L on 20    Hypothyroidism    CVA (cerebral vascular accident)  19 with residual bilateral weakness    Anemia    PEG (percutaneous endoscopic gastrostomy) status  removed 2020    Intubation of airway performed without difficulty  Dec 2019  CVA c/b status epilepticus requiring intubation and PEG in 2019-    ESRD on dialysis  M/W/F    Seizures  after CVA, last seizure was last week 22    History of insertion of stent into coronary artery bypass graft   and     S/P CABG x 3  off pump C3L on 20        Review of Systems:   CONSTITUTIONAL: No fever, weight loss, or fatigue  EYES: No eye pain, visual disturbances, or discharge  ENMT:  No difficulty hearing, tinnitus, vertigo; No sinus or throat pain  NECK: No pain or stiffness  BREASTS: No pain, masses, or nipple discharge  RESPIRATORY: No cough, wheezing, chills or hemoptysis; No shortness of breath  CARDIOVASCULAR: No chest pain, palpitations, dizziness, or leg swelling  GASTROINTESTINAL: No abdominal or epigastric pain. No nausea, vomiting, or hematemesis; No diarrhea or constipation. No melena or hematochezia.  GENITOURINARY: No dysuria, frequency, hematuria, or incontinence  NEUROLOGICAL: No headaches, memory loss, loss of strength, numbness, or tremors  SKIN: No itching, burning, rashes, or lesions   LYMPH NODES: No enlarged glands  ENDOCRINE: No heat or cold intolerance; No hair loss  MUSCULOSKELETAL: No joint pain or swelling; No muscle, back, or extremity pain  PSYCHIATRIC: No depression, anxiety, mood swings, or difficulty sleeping  HEME/LYMPH: No easy bruising, or bleeding gums  ALLERY AND IMMUNOLOGIC: No hives or eczema    Allergies    No Known Allergies    Intolerances        Social History:     FAMILY HISTORY:  Family history of cancer of tongue (Father)  Parents are  . Father - at 80 years, tongue cancer was a smoker. Mother- at 71, had accident while crossing road. Siblings- 2 brothers and one sister.        MEDICATIONS  (STANDING):  amLODIPine   Tablet 10 milliGRAM(s) Oral daily  atorvastatin 40 milliGRAM(s) Oral at bedtime  carvedilol 3.125 milliGRAM(s) Oral every 12 hours  dextrose 5%. 1000 milliLiter(s) (50 mL/Hr) IV Continuous <Continuous>  dextrose 5%. 1000 milliLiter(s) (100 mL/Hr) IV Continuous <Continuous>  dextrose 50% Injectable 25 Gram(s) IV Push once  dextrose 50% Injectable 12.5 Gram(s) IV Push once  dextrose 50% Injectable 25 Gram(s) IV Push once  diVALproex  milliGRAM(s) Oral <User Schedule>  glucagon  Injectable 1 milliGRAM(s) IntraMuscular once  insulin lispro (ADMELOG) corrective regimen sliding scale   SubCutaneous three times a day before meals  insulin lispro (ADMELOG) corrective regimen sliding scale   SubCutaneous at bedtime  levETIRAcetam 250 milliGRAM(s) Oral two times a day  levothyroxine 50 MICROGram(s) Oral daily  mycophenolate mofetil 1 Gram(s) Oral <User Schedule>  nystatin    Suspension 934347 Unit(s) Swish and Swallow four times a day  sodium chloride 0.9%. 1000 milliLiter(s) (70 mL/Hr) IV Continuous <Continuous>  trimethoprim   80 mG/sulfamethoxazole 400 mG 1 Tablet(s) Oral daily  valGANciclovir 450 milliGRAM(s) Oral <User Schedule>    MEDICATIONS  (PRN):  chlorhexidine 2% Cloths 1 Application(s) Topical daily PRN cloth  dextrose Oral Gel 15 Gram(s) Oral once PRN Blood Glucose LESS THAN 70 milliGRAM(s)/deciliter  ondansetron Injectable 4 milliGRAM(s) IV Push every 6 hours PRN Nausea and/or Vomiting        CAPILLARY BLOOD GLUCOSE      POCT Blood Glucose.: 251 mg/dL (2022 21:12)  POCT Blood Glucose.: 298 mg/dL (2022 17:05)  POCT Blood Glucose.: 223 mg/dL (2022 16:29)  POCT Blood Glucose.: 219 mg/dL (2022 13:23)  POCT Blood Glucose.: 190 mg/dL (2022 07:24)  POCT Blood Glucose.: 201 mg/dL (2022 05:52)  POCT Blood Glucose.: 214 mg/dL (2022 05:25)  POCT Blood Glucose.: 144 mg/dL (2022 04:43)    I&O's Summary    2022 07:01  -  2022 07:00  --------------------------------------------------------  IN: 1217.6 mL / OUT: 665 mL / NET: 552.6 mL    2022 07:01  -  2022 23:46  --------------------------------------------------------  IN: 2163.2 mL / OUT: 515 mL / NET: 1648.2 mL        PHYSICAL EXAM:  Vital Signs Last 24 Hrs  T(C): 36.7 (2022 23:05), Max: 37.2 (2022 11:50)  T(F): 98 (2022 23:05), Max: 98.9 (2022 11:50)  HR: 56 (2022 23:05) (55 - 75)  BP: 124/59 (2022 23:05) (121/56 - 177/81)  BP(mean): 99 (2022 07:00) (86 - 119)  RR: 18 (2022 23:05) (14 - 18)  SpO2: 100% (2022 23:05) (93% - 100%)    GENERAL: NAD, well-developed  HEAD:  Atraumatic, Normocephalic  EYES: EOMI, PERRLA, conjunctiva and sclera clear  NECK: Supple, No JVD  CHEST/LUNG: Clear to auscultation bilaterally; No wheeze  HEART: Regular rate and rhythm; No murmurs, rubs, or gallops  ABDOMEN: Soft, Nontender, Nondistended; Bowel sounds present, drain +  EXTREMITIES:  2+ Peripheral Pulses, No clubbing, cyanosis, or edema  PSYCH: AAOx3  NEUROLOGY: non-focal  SKIN: No rashes or lesions    LABS:                        8.5    15.34 )-----------( 137      ( 2022 14:58 )             29.4         132<L>  |  97  |  62<H>  ----------------------------<  276<H>  6.0<H>   |  16<L>  |  6.56<H>    Ca    8.8      2022 20:34  Phos  4.7       Mg     2.1         TPro  6.1  /  Alb  2.7<L>  /  TBili  0.3  /  DBili  x   /  AST  44<H>  /  ALT  11  /  AlkPhos  91      PT/INR - ( 2022 10:54 )   PT: 17.5 sec;   INR: 1.50 ratio         PTT - ( 2022 10:54 )  PTT:40.0 sec          RADIOLOGY & ADDITIONAL TESTS:    Imaging Personally Reviewed:    Consultant(s) Notes Reviewed:      Care Discussed with Consultants/Other Providers:

## 2022-04-24 NOTE — CONSULT NOTE ADULT - PROBLEM SELECTOR RECOMMENDATION 9
While inpatient CHO diet and FS AC and HS  Goal Glucose 100-180  Patient has been tapered from 250mg followed by 125mg to solumedrol 60mg IV for tonight  Recommend Lantus 14 Units (daily at same time) and Admelog 7 units before meals plus low admelog scale before meals and bedtime  Discharge plan TBD based on insulin requirements  Patient will f/u with dr Lincoln as outpt

## 2022-04-24 NOTE — PHYSICAL THERAPY INITIAL EVALUATION ADULT - ADDITIONAL COMMENTS
Pt lives in a PH with his spouse and sons, +4 steps to enter with B/L HR. All needs met on one floor. PTA Pt was ambulating with R/W and some assist, required some assist with ADls from spouse

## 2022-04-24 NOTE — DISCHARGE NOTE PROVIDER - NSDCCPCAREPLAN_GEN_ALL_CORE_FT
PRINCIPAL DISCHARGE DIAGNOSIS  Diagnosis: Kidney transplant recipient  Assessment and Plan of Treatment: - Avoid heavy lifting aything over 5lbs for six weeks following your surgery date. Do NOT drive a car or operate machinery unless cleared by your transplant surgeon during your follow up visit. Avoid straining, use stool softners as needed. Stop stool softners if diarrhea develops.   - Call transplant clinic if you develop fever, increased abdominal pain, redness/swelling or bleeding around your incision site  - Call transplant clinic if you have difficulty urinating, notice decrease in urine output or blood in urine.   - Follow FOOD SAFETY instructions provided to you in your patient education guide booklet   - Bathing: Shower is allowed, you can let soap and water flow over your incision; do NOT rub the area. Bath is NOT allowed until your incision is healed and you have been cleared by your transplant surgeon.         SECONDARY DISCHARGE DIAGNOSES  Diagnosis: Immunosuppression  Assessment and Plan of Treatment: If you developed any fever, edema, redness, shortness of breath, chest pain, difficulty urination, burning with urination, no urine output, and or any change in your condition you may call transplant clinic at any time 24 hours/day to speak with a coordinator  - Transplant recipients are at risk for developing infection because immune system is lowered due to transplant medications.   - Avoid contact with sick people; practice good hand hygiene;   - Take medications as directed by your translant team. Never stop taking medications unless instructed by your transplant physician.  - Do NOT double up medication dose if you missed your dose; call the transplant office for instructions.        Diagnosis: Hypertension  Assessment and Plan of Treatment: Be sure to follow a low salt diet, avoid caffeine, reduce alcohol intake.  If you have been prescribed antihypertensive medications to control your blood pressure, be sure to take them every day as prescribed and do not miss any doses, the medications do not work if they are not taken consistently. Follow up with your Primary Care Doctor and have your Blood Pressure checked regularly.       Diagnosis: Diabetes mellitus with chronic kidney disease on chronic dialysis, with long-term current use of insulin  Assessment and Plan of Treatment:   If you have diabetes, you may need to monitor your blood sugar more frequently after transplant. Uncongrolled blood sugar levels can lead to poor wound healing and other complications. Follow a low carb and low sugar diet. Continue to take all anti-diabetic medications/insulin as prescribed. Follow up with your Primary Care Doctor regularly for blood sugar/A1c checks. Follow up with an opthalmologist and a podiatrist on an annual basis.

## 2022-04-24 NOTE — PROGRESS NOTE ADULT - ASSESSMENT
Assessment: 66 year old male with history of HTN, HLD, DM (on insulin), ESRD (2/2 DM and HTN) on HD via permacath since 3yrs ago MWF, afib (on eliquis 2.5mg BID), hx of stroke (2019 2/2 afib), focal seizure (on depakote, keppra), hx of CABG (2020), hx of nonbleeding gastric/duodenal ulcer (EGD 2/9/22, on protonix) presents for potential DDRT. Makes minimal urine. Last HD: 4/20 (nephrologist, Dr. Barrientos). He is now s/p DCD DDRT with thymo induction and STENT placement.     [] s/p DDRT w/ Thymo Induction- POD3  -DGF: HD 4/22 2/2 hyperkalemia/low UOP  -Improvement in UOP overnight  -K 5.5- treated w/ 80 IV lasix this AM, f/u 2pm BMP   -May need HD if no improvement in K post-lasix   -DC tucker this afternoon, strict I&Os  -CC renal diet  -SCDs  -PRN Pain regimen with Tylenol, Tramadol   -Bowel regimen  -Daily CBC, CMP, Mg/Phos, Coags, [place] level    [] Immunosuppression  -Thymoglobulin induction (total 4.5mg/kg thus far)   -Envarsus 3, MMF 1g BID, Steroid taper   -Thymoglobulin 100mg + Solumedrol 60mg today   -Daily FK level   -PPX: Valcyte, Nystatin, Bactrim, Pepcid     [] HTN/AFib:  -Continue half home dose Coreg 3.125 BID  -Continue home dose Atorvastatin, Norvasc 10   -Holding home Hydralazine, Telmisartan   -Holding Eliquis in zackery-op period     [] IDDM  -POCT glucose q6h  -Continue mISS  -Was on mISS at home     [] Hx CVA (residual bilateral weakness) c/b Seizure Disorder  -Continue home Depakote and Keppra   -PT/OT consult to assist in ambulation     [] Hypothyroidism  -Continue home Synthroid

## 2022-04-24 NOTE — OCCUPATIONAL THERAPY INITIAL EVALUATION ADULT - RUE MMT, REHAB EVAL
proximal to distal 3/5 shoulder, elbow 3-/5 to 3/5, wrist distally in hand decreased grasp strength/grossly assessed due to

## 2022-04-24 NOTE — OCCUPATIONAL THERAPY INITIAL EVALUATION ADULT - LIVES WITH, PROFILE
Per pt report, lives in 1 story private home with spouse and 2 adult sons, 4 steps to enter with bilateral rails, bedroom/tub shower on main level./children/spouse

## 2022-04-24 NOTE — PHYSICAL THERAPY INITIAL EVALUATION ADULT - PRECAUTIONS/LIMITATIONS, REHAB EVAL
Last HD: 4/20 (nephrologist, Dr. Barrientos) Now s/p Cadaveric renal transplant on 4/21 with thymo induction and STENT placement. Post op course: noted to have serum k of 5.6 and was treated medically. Pt also noted to have sangenous ALINA output ~ 555 cc. Pt also noted to be hypertensive recieved IV labetalol and hydralazine. Urine output has not improved. CXR 4/21: Progression of right effusion. CXR 4/21: Mild pulmonary edema and small right pleural effusion. US Kidney Right 4/21: Well-perfused right lower quadrant renal transplant. Mildly elevated velocities in the transplant renal artery and iliac anastomosis, may be related to postoperative edema./fall precautions

## 2022-04-24 NOTE — DISCHARGE NOTE PROVIDER - NSDCMRMEDTOKEN_GEN_ALL_CORE_FT
amLODIPine 5 mg oral tablet: 1 tab(s) orally once a day  apixaban 2.5 mg oral tablet: 1 tab(s) orally 2 times a day  atorvastatin 80 mg oral tablet: 1 tab(s) orally once a day (at bedtime)  Coreg 6.25 mg oral tablet: 1 tab(s) orally 2 times a day  Depakote 250 mg oral delayed release tablet: 1 tab(s) orally 1 times a day  Depakote 500 mg oral delayed release tablet: 1 tab(s) orally 2 times a day  hydrALAZINE 10 mg oral tablet: orally 3 times a day  insulin lispro 100 units/mL injectable solution: 2 Unit(s) if Glucose 151 - 200  4 Unit(s) if Glucose 201 - 250  6 Unit(s) if Glucose 251 - 300  8 Unit(s) if Glucose 301 - 350  10 Unit(s) if Glucose 351 - 400  12 Unit(s) if Glucose Greater Than 400  levETIRAcetam 250 mg oral tablet: 1 tab(s) orally 2 times a day and an extra dose of 125mg dialysis on dialysis days  levothyroxine 50 mcg (0.05 mg) oral tablet: 1 tab(s) orally once a day  Outpatient Physical Therapy:   Protonix 40 mg oral delayed release tablet: 1 tab(s) orally once a day  Retacrit 10,000 units/mL injectable solution: injectable 3 times a week INTRADIALYSIS  sevelamer hydrochloride 800 mg oral tablet: 1 tab(s) orally 3 times a day  telmisartan 80 mg oral tablet: 1 tab(s) orally once a day   amLODIPine 10 mg oral tablet: 1 tab(s) orally once a day  apixaban 2.5 mg oral tablet: 1 tab(s) orally 2 times a day  atorvastatin 40 mg oral tablet: 1 tab(s) orally once a day (at bedtime)  carvedilol 3.125 mg oral tablet: 1 tab(s) orally every 12 hours  divalproex sodium 250 mg oral delayed release tablet: 1 tab(s) orally once a day  divalproex sodium 500 mg oral delayed release tablet: 1 tab(s) orally 2 times a day  furosemide 80 mg oral tablet: 1 tab(s) orally 2 times a day  HumaLOG 100 units/mL injectable solution: 9 unit(s) injectable 3 times a day  insulin glargine 100 units/mL subcutaneous solution: 6 unit(s) subcutaneous once a day (at bedtime)  levETIRAcetam 250 mg oral tablet: 1 tab(s) orally 2 times a day and an extra dose of 125mg dialysis on dialysis days  levETIRAcetam 250 mg oral tablet: 1 tab(s) orally 2 times a day  levothyroxine 50 mcg (0.05 mg) oral tablet: 1 tab(s) orally once a day  mycophenolate mofetil 250 mg oral capsule: 1 gram(s) orally 2 times a day  nystatin 100,000 units/mL oral suspension: 5 milliliter(s) orally 4 times a day  pantoprazole 40 mg oral delayed release tablet: 1 tab(s) orally once a day (before a meal)  predniSONE: 5 milligram(s) orally once a day  senna oral tablet: 2 tab(s) orally once a day (at bedtime)  sevelamer hydrochloride 800 mg oral tablet: 1 tab(s) orally 3 times a day  sodium bicarbonate 650 mg oral tablet: 2 tab(s) orally every 12 hours  sulfamethoxazole-trimethoprim 400 mg-80 mg oral tablet: 1 tab(s) orally once a day  tacrolimus 1 mg oral tablet, extended release: 2 tab(s) orally once a day  valGANciclovir 450 mg oral tablet: 1 tab(s) orally 3 times a week   amLODIPine 10 mg oral tablet: 1 tab(s) orally once a day  apixaban 2.5 mg oral tablet: 1 tab(s) orally 2 times a day  atorvastatin 40 mg oral tablet: 1 tab(s) orally once a day (at bedtime)  carvedilol 3.125 mg oral tablet: 1 tab(s) orally every 12 hours  divalproex sodium 250 mg oral delayed release tablet: 1 tab(s) orally once a day  divalproex sodium 500 mg oral delayed release tablet: 1 tab(s) orally 2 times a day  furosemide 80 mg oral tablet: 1 tab(s) orally 2 times a day  HumaLOG 100 units/mL injectable solution: 7 unit(s) injectable 3 times a day  insulin glargine 100 units/mL subcutaneous solution: 7 unit(s) subcutaneous once a day (at bedtime)  levETIRAcetam 250 mg oral tablet: 1 tab(s) orally 2 times a day and an extra dose of 125mg dialysis on dialysis days  levETIRAcetam 250 mg oral tablet: 1 tab(s) orally 2 times a day  levothyroxine 50 mcg (0.05 mg) oral tablet: 1 tab(s) orally once a day  mycophenolate mofetil 250 mg oral capsule: 1 gram(s) orally 2 times a day  nystatin 100,000 units/mL oral suspension: 5 milliliter(s) orally 4 times a day  pantoprazole 40 mg oral delayed release tablet: 1 tab(s) orally once a day (before a meal)  predniSONE: 5 milligram(s) orally once a day  senna oral tablet: 2 tab(s) orally once a day (at bedtime)  sevelamer hydrochloride 800 mg oral tablet: 1 tab(s) orally 3 times a day  sodium bicarbonate 650 mg oral tablet: 2 tab(s) orally every 12 hours  sulfamethoxazole-trimethoprim 400 mg-80 mg oral tablet: 1 tab(s) orally once a day  tacrolimus 1 mg oral tablet, extended release: 2 tab(s) orally once a day  valGANciclovir 450 mg oral tablet: 1 tab(s) orally 3 times a week   amLODIPine 10 mg oral tablet: 1 tab(s) orally once a day  apixaban 2.5 mg oral tablet: 1 tab(s) orally 2 times a day  atorvastatin 40 mg oral tablet: 1 tab(s) orally once a day (at bedtime)  carvedilol 3.125 mg oral tablet: 1 tab(s) orally every 12 hours  divalproex sodium 250 mg oral delayed release tablet: 1 tab(s) orally once a day  divalproex sodium 500 mg oral delayed release tablet: 1 tab(s) orally 2 times a day  furosemide 80 mg oral tablet: 1 tab(s) orally 2 times a day  HumaLOG 100 units/mL injectable solution: 7 unit(s) injectable 3 times a day  insulin glargine 100 units/mL subcutaneous solution: 7 unit(s) subcutaneous once a day (at bedtime)  levETIRAcetam 250 mg oral tablet: 1 tab(s) orally 2 times a day and an extra dose of 125mg dialysis on dialysis days  levETIRAcetam 250 mg oral tablet: 1 tab(s) orally 2 times a day  levothyroxine 50 mcg (0.05 mg) oral tablet: 1 tab(s) orally once a day  mycophenolate mofetil 250 mg oral capsule: 1 gram(s) orally 2 times a day  nystatin 100,000 units/mL oral suspension: 5 milliliter(s) orally 4 times a day  pantoprazole 40 mg oral delayed release tablet: 1 tab(s) orally once a day (before a meal)  Plavix 75 mg oral tablet: 1 tab(s) orally once a day  predniSONE: 5 milligram(s) orally once a day  senna oral tablet: 2 tab(s) orally once a day (at bedtime)  sevelamer hydrochloride 800 mg oral tablet: 1 tab(s) orally 3 times a day  sodium bicarbonate 650 mg oral tablet: 2 tab(s) orally every 12 hours  sulfamethoxazole-trimethoprim 400 mg-80 mg oral tablet: 1 tab(s) orally once a day  tacrolimus 1 mg oral tablet, extended release: 2 tab(s) orally once a day  valGANciclovir 450 mg oral tablet: 1 tab(s) orally 3 times a week

## 2022-04-24 NOTE — CONSULT NOTE ADULT - ASSESSMENT
66 yr old M with ESRD and Hx of CVA and CAD s/p CABG, Type 2 DM A1C 7.3 here s/p DDRT on 3/21. Endocrine consulted for steroid exacerbated hyperglycemia.

## 2022-04-24 NOTE — OCCUPATIONAL THERAPY INITIAL EVALUATION ADULT - IMPAIRMENTS CONTRIBUTING IMPAIRED BED MOBILITY, REHAB EVAL
impaired balance/impaired coordination/decreased flexibility/impaired motor control/decreased strength

## 2022-04-24 NOTE — CONSULT NOTE ADULT - SUBJECTIVE AND OBJECTIVE BOX
HPI:  This is a 66 year old male with history of HTN, HLD, DM (on insulin), ESRD (2/2 DM and HTN) on HD via permacath since 3yrs ago MWF, afib (on eliquis 2.5mg BID), hx of stroke (2019/2 afib), focal seizure (on depakote, keppra), hx of CABG (), hx of nonbleeding gastric/duodenal ulcer (EGD 22, on protonix) presents for potential DDRT.     Makes minimal urine  Last HD: Yesterday  (nephrologist, Dr. Barrientos)  NPO since: 9AM     Recent testin22: ECG: Sinus Rhythm WDL (cardiologist Dr. Bose) (2022 09:23)      PAST MEDICAL & SURGICAL HISTORY:  Hypertension    Diabetes    Dyslipidemia    CAD (Coronary Artery Disease)  with Stents in 2009 and 2019, s/p off-pump C3L on 20    Hypothyroidism    CVA (cerebral vascular accident)  19 with residual bilateral weakness    Anemia    PEG (percutaneous endoscopic gastrostomy) status  removed 2020    Intubation of airway performed without difficulty  Dec 2019  CVA c/b status epilepticus requiring intubation and PEG in 2019-    ESRD on dialysis  M/W/F    Seizures  after CVA, last seizure was last week 22    History of insertion of stent into coronary artery bypass graft   and     S/P CABG x 3  off pump C3L on 20        FAMILY HISTORY:  Family history of cancer of tongue (Father)  Parents are  . Father - at 80 years, tongue cancer was a smoker. Mother- at 71, had accident while crossing road. Siblings- 2 brothers and one sister.        Social History:    Outpatient Medications:    MEDICATIONS  (STANDING):  acetaminophen     Tablet .. 650 milliGRAM(s) Oral once  amLODIPine   Tablet 10 milliGRAM(s) Oral daily  antithymocyte globulin rabbit (peripheral) IVPB w/additives 100 milliGRAM(s) IV Intermittent once  atorvastatin 40 milliGRAM(s) Oral at bedtime  calcium acetate 667 milliGRAM(s) Oral three times a day with meals  carvedilol 3.125 milliGRAM(s) Oral every 12 hours  dextrose 5%. 1000 milliLiter(s) (100 mL/Hr) IV Continuous <Continuous>  dextrose 5%. 1000 milliLiter(s) (50 mL/Hr) IV Continuous <Continuous>  dextrose 50% Injectable 25 Gram(s) IV Push once  dextrose 50% Injectable 12.5 Gram(s) IV Push once  dextrose 50% Injectable 25 Gram(s) IV Push once  diphenhydrAMINE 25 milliGRAM(s) Oral once  diVALproex  milliGRAM(s) Oral <User Schedule>  diVALproex  milliGRAM(s) Oral <User Schedule>  glucagon  Injectable 1 milliGRAM(s) IntraMuscular once  insulin glargine Injectable (LANTUS) 14 Unit(s) SubCutaneous <User Schedule>  insulin lispro (ADMELOG) corrective regimen sliding scale   SubCutaneous three times a day before meals  insulin lispro (ADMELOG) corrective regimen sliding scale   SubCutaneous at bedtime  insulin lispro Injectable (ADMELOG) 7 Unit(s) SubCutaneous three times a day before meals  levETIRAcetam 250 milliGRAM(s) Oral two times a day  levothyroxine 50 MICROGram(s) Oral daily  melatonin 5 milliGRAM(s) Oral at bedtime  methylPREDNISolone sodium succinate Injectable 60 milliGRAM(s) IV Push once  mycophenolate mofetil 1 Gram(s) Oral <User Schedule>  nystatin    Suspension 582734 Unit(s) Swish and Swallow four times a day  pantoprazole    Tablet 40 milliGRAM(s) Oral before breakfast  polyethylene glycol 3350 17 Gram(s) Oral every 24 hours  senna 2 Tablet(s) Oral at bedtime  sodium bicarbonate 1300 milliGRAM(s) Oral every 12 hours  tacrolimus ER Tablet (ENVARSUS XR) 5 milliGRAM(s) Oral <User Schedule>  trimethoprim   80 mG/sulfamethoxazole 400 mG 1 Tablet(s) Oral daily  valGANciclovir 450 milliGRAM(s) Oral <User Schedule>    MEDICATIONS  (PRN):  chlorhexidine 2% Cloths 1 Application(s) Topical daily PRN cloth  dextrose Oral Gel 15 Gram(s) Oral once PRN Blood Glucose LESS THAN 70 milliGRAM(s)/deciliter  ondansetron Injectable 4 milliGRAM(s) IV Push every 6 hours PRN Nausea and/or Vomiting      Allergies    No Known Allergies    Intolerances      Review of Systems:  Constitutional: No fever  Eyes: No blurry vision  Neuro: No tremors  HEENT: No pain  Cardiovascular: No chest pain, palpitations  Respiratory: No SOB, no cough  GI: No nausea, vomiting, abdominal pain  : No dysuria  Skin: no rash  Psych: no depression  Endocrine: no polyuria, polydipsia  Hem/lymph: no swelling  Osteoporosis: no fractures    ALL OTHER SYSTEMS REVIEWED AND NEGATIVE    UNABLE TO OBTAIN    PHYSICAL EXAM:  VITALS: T(C): 36.4 (22 @ 12:23)  T(F): 97.5 (22 @ 12:23), Max: 98 (22 @ 17:30)  HR: 69 (22 @ 12:23) (56 - 69)  BP: 122/58 (22 @ 12:23) (110/58 - 161/82)  RR:  (18 - 20)  SpO2:  (94% - 100%)  Wt(kg): --  GENERAL: NAD, well-groomed, well-developed  EYES: No proptosis, no lid lag, anicteric  HEENT:  Atraumatic, Normocephalic, moist mucous membranes  THYROID: Normal size, no palpable nodules  RESPIRATORY: Clear to auscultation bilaterally; No rales, rhonchi, wheezing, or rubs  CARDIOVASCULAR: Regular rate and rhythm; No murmurs; no peripheral edema  GI: Soft, nontender, non distended, normal bowel sounds  SKIN: Dry, intact, No rashes or lesions  MUSCULOSKELETAL: Full range of motion, normal strength  NEURO: sensation intact, extraocular movements intact, no tremor, normal reflexes  PSYCH: Alert and oriented x 3, normal affect, normal mood  CUSHING'S SIGNS: no striae    POCT Blood Glucose.: 270 mg/dL (22 @ 14:08)  POCT Blood Glucose.: 291 mg/dL (22 @ 13:06)  POCT Blood Glucose.: 276 mg/dL (22 @ 08:59)  POCT Blood Glucose.: 236 mg/dL (22 @ 02:26)  POCT Blood Glucose.: 159 mg/dL (22 @ 21:11)  POCT Blood Glucose.: 179 mg/dL (22 @ 17:04)  POCT Blood Glucose.: 239 mg/dL (22 @ 12:47)  POCT Blood Glucose.: 198 mg/dL (22 @ 08:52)  POCT Blood Glucose.: 197 mg/dL (22 @ 06:16)  POCT Blood Glucose.: 140 mg/dL (22 @ 02:05)  POCT Blood Glucose.: 251 mg/dL (22 @ 21:12)  POCT Blood Glucose.: 298 mg/dL (22 @ 17:05)  POCT Blood Glucose.: 223 mg/dL (22 @ 16:29)  POCT Blood Glucose.: 219 mg/dL (22 @ 13:23)  POCT Blood Glucose.: 190 mg/dL (22 @ 07:24)  POCT Blood Glucose.: 201 mg/dL (22 @ 05:52)  POCT Blood Glucose.: 214 mg/dL (22 @ 05:25)  POCT Blood Glucose.: 144 mg/dL (22 @ 04:43)  POCT Blood Glucose.: 93 mg/dL (22 @ 22:45)                            8.6    9.47  )-----------( 117      ( 2022 06:21 )             28.5           128<L>  |  91<L>  |  80<H>  ----------------------------<  259<H>  5.1   |  18<L>  |  5.35<H>    EGFR if : x   EGFR if non : x     Ca    7.8<L>        Mg     2.3       Phos  6.3         TPro  5.8<L>  /  Alb  2.7<L>  /  TBili  0.2  /  DBili  x   /  AST  22  /  ALT  6<L>  /  AlkPhos  87        Thyroid Function Tests:              Radiology:                  HPI:  This is a 66 year old male with history of HTN, HLD, DM (on insulin), ESRD (2/2 DM and HTN) on HD via permacath since 3yrs ago MWF, afib (on eliquis 2.5mg BID), hx of stroke ( afib), focal seizure (on depakote, keppra), hx of CABG (), hx of nonbleeding gastric/duodenal ulcer (EGD 22, on protonix) presents for DDRT.     Endocrine History:  66 yr old M with ESRD and Hx of CVA and CAD s/p CABG, Type 2 DM A1C 7.3 here s/p DDRT on 3/21. Endocrine consulted for steroid exacerbated hyperglycemia. Patient was started on solumedrol 250mg IV which is being tapered. He has had long standing Type 2 DM. He follows with endocrinologist Dr Lincoln. He used to be on a basal/bolus regimen in the past with Levemir and Novolog. States that a few months ago he was having frequent hypoglycemia with Levemir so discontinued it. He now takes only Novolog 3-5 units (based on correctional scale) before meals. He tries to moderate his intake of carbs and avoid excessive consumption of rice and roti. States his glucose at home is between 100-200 and denies frequent hypoglycemia.           PAST MEDICAL & SURGICAL HISTORY:  Hypertension    Diabetes    Dyslipidemia    CAD (Coronary Artery Disease)  with Stents in 2009 and 2019, s/p off-pump C3L on 20    Hypothyroidism    CVA (cerebral vascular accident)  19 with residual bilateral weakness    Anemia    PEG (percutaneous endoscopic gastrostomy) status  removed 2020    Intubation of airway performed without difficulty  Dec 2019  CVA c/b status epilepticus requiring intubation and PEG in 2019-    ESRD on dialysis  M/W/F    Seizures  after CVA, last seizure was last week 22    History of insertion of stent into coronary artery bypass graft   and     S/P CABG x 3  off pump C3L on 20        FAMILY HISTORY:  Family history of cancer of tongue (Father)  Parents are  . Father - at 80 years, tongue cancer was a smoker. Mother- at 71, had accident while crossing road. Siblings- 2 brothers and one sister.        Social History: No illicit drug use, No ETOH abuse    Outpatient Medications: See outpt med rec    MEDICATIONS  (STANDING):  acetaminophen     Tablet .. 650 milliGRAM(s) Oral once  amLODIPine   Tablet 10 milliGRAM(s) Oral daily  antithymocyte globulin rabbit (peripheral) IVPB w/additives 100 milliGRAM(s) IV Intermittent once  atorvastatin 40 milliGRAM(s) Oral at bedtime  calcium acetate 667 milliGRAM(s) Oral three times a day with meals  carvedilol 3.125 milliGRAM(s) Oral every 12 hours  dextrose 5%. 1000 milliLiter(s) (100 mL/Hr) IV Continuous <Continuous>  dextrose 5%. 1000 milliLiter(s) (50 mL/Hr) IV Continuous <Continuous>  dextrose 50% Injectable 25 Gram(s) IV Push once  dextrose 50% Injectable 12.5 Gram(s) IV Push once  dextrose 50% Injectable 25 Gram(s) IV Push once  diphenhydrAMINE 25 milliGRAM(s) Oral once  diVALproex  milliGRAM(s) Oral <User Schedule>  diVALproex  milliGRAM(s) Oral <User Schedule>  glucagon  Injectable 1 milliGRAM(s) IntraMuscular once  insulin glargine Injectable (LANTUS) 14 Unit(s) SubCutaneous <User Schedule>  insulin lispro (ADMELOG) corrective regimen sliding scale   SubCutaneous three times a day before meals  insulin lispro (ADMELOG) corrective regimen sliding scale   SubCutaneous at bedtime  insulin lispro Injectable (ADMELOG) 7 Unit(s) SubCutaneous three times a day before meals  levETIRAcetam 250 milliGRAM(s) Oral two times a day  levothyroxine 50 MICROGram(s) Oral daily  melatonin 5 milliGRAM(s) Oral at bedtime  methylPREDNISolone sodium succinate Injectable 60 milliGRAM(s) IV Push once  mycophenolate mofetil 1 Gram(s) Oral <User Schedule>  nystatin    Suspension 501499 Unit(s) Swish and Swallow four times a day  pantoprazole    Tablet 40 milliGRAM(s) Oral before breakfast  polyethylene glycol 3350 17 Gram(s) Oral every 24 hours  senna 2 Tablet(s) Oral at bedtime  sodium bicarbonate 1300 milliGRAM(s) Oral every 12 hours  tacrolimus ER Tablet (ENVARSUS XR) 5 milliGRAM(s) Oral <User Schedule>  trimethoprim   80 mG/sulfamethoxazole 400 mG 1 Tablet(s) Oral daily  valGANciclovir 450 milliGRAM(s) Oral <User Schedule>    MEDICATIONS  (PRN):  chlorhexidine 2% Cloths 1 Application(s) Topical daily PRN cloth  dextrose Oral Gel 15 Gram(s) Oral once PRN Blood Glucose LESS THAN 70 milliGRAM(s)/deciliter  ondansetron Injectable 4 milliGRAM(s) IV Push every 6 hours PRN Nausea and/or Vomiting      Allergies    No Known Allergies    Intolerances      Review of Systems:  Constitutional: No fever  Eyes: No blurry vision  Neuro: No tremors  HEENT: No pain  Cardiovascular: No chest pain, palpitations  Respiratory: No SOB, no cough  GI: No nausea, vomiting, abdominal pain  : No dysuria  Skin: no rash  Psych: no depression  Endocrine: no polyuria, polydipsia      ALL OTHER SYSTEMS REVIEWED AND NEGATIVE        PHYSICAL EXAM:  VITALS: T(C): 36.4 (22 @ 12:23)  T(F): 97.5 (22 @ 12:23), Max: 98 (22 @ 17:30)  HR: 69 (22 @ 12:23) (56 - 69)  BP: 122/58 (22 @ 12:23) (110/58 - 161/82)  RR:  (18 - 20)  SpO2:  (94% - 100%)  Wt(kg): --  GENERAL: NAD, well-groomed, well-developed  EYES: No proptosis, no lid lag, anicteric  HEENT:  Atraumatic, Normocephalic, moist mucous membranes  RESPIRATORY: Clear to auscultation bilaterally  CARDIOVASCULAR: Regular rate and rhythm  GI: Soft, nontender, non distended, normal bowel sounds  SKIN: Dry, intact, No rashes or lesions  PSYCH: Alert and oriented x 3, normal affect, normal mood      POCT Blood Glucose.: 270 mg/dL (22 @ 14:08)  POCT Blood Glucose.: 291 mg/dL (22 @ 13:06)  POCT Blood Glucose.: 276 mg/dL (22 @ 08:59)  POCT Blood Glucose.: 236 mg/dL (22 @ 02:26)  POCT Blood Glucose.: 159 mg/dL (22 @ 21:11)  POCT Blood Glucose.: 179 mg/dL (22 @ 17:04)  POCT Blood Glucose.: 239 mg/dL (22 @ 12:47)  POCT Blood Glucose.: 198 mg/dL (22 @ 08:52)  POCT Blood Glucose.: 197 mg/dL (22 @ 06:16)  POCT Blood Glucose.: 140 mg/dL (22 @ 02:05)  POCT Blood Glucose.: 251 mg/dL (22 @ 21:12)  POCT Blood Glucose.: 298 mg/dL (22 @ 17:05)  POCT Blood Glucose.: 223 mg/dL (22 @ 16:29)  POCT Blood Glucose.: 219 mg/dL (22 @ 13:23)  POCT Blood Glucose.: 190 mg/dL (22 @ 07:24)  POCT Blood Glucose.: 201 mg/dL (22 @ 05:52)  POCT Blood Glucose.: 214 mg/dL (22 @ 05:25)  POCT Blood Glucose.: 144 mg/dL (22 @ 04:43)  POCT Blood Glucose.: 93 mg/dL (22 @ 22:45)                            8.6    9.47  )-----------( 117      ( 2022 06:21 )             28.5           128<L>  |  91<L>  |  80<H>  ----------------------------<  259<H>  5.1   |  18<L>  |  5.35<H>    EGFR if : x   EGFR if non : x     Ca    7.8<L>        Mg     2.3       Phos  6.3         TPro  5.8<L>  /  Alb  2.7<L>  /  TBili  0.2  /  DBili  x   /  AST  22  /  ALT  6<L>  /  AlkPhos  87

## 2022-04-24 NOTE — OCCUPATIONAL THERAPY INITIAL EVALUATION ADULT - DIAGNOSIS, OT EVAL
Pt currently presents with decreased endurance, balance, RUE weakness, dysmetria RUE and strength limiting independence with ADLs and functional mobility.

## 2022-04-24 NOTE — CONSULT NOTE ADULT - PROBLEM SELECTOR PROBLEM 1
Diabetes mellitus with chronic kidney disease on chronic dialysis, with long-term current use of insulin

## 2022-04-24 NOTE — OCCUPATIONAL THERAPY INITIAL EVALUATION ADULT - AMBULATORY DEVICES NEEDED
Per pt report, ambulatory with rolling walker and has a cane prior to admission; community mobility from family/yes

## 2022-04-24 NOTE — PROGRESS NOTE ADULT - ASSESSMENT
DDRT performed  May need HD for hyperkalemia    HTN:/ DM  Management as per surgical transplant team

## 2022-04-24 NOTE — DISCHARGE NOTE PROVIDER - NSRESEARCHGRANT_PROPHYLAXISRECOMFT_GEN_A_CORE
This is a surgical and/or non-medical patient.
<<----- Click to add NO significant Past Surgical History

## 2022-04-24 NOTE — PROGRESS NOTE ADULT - SUBJECTIVE AND OBJECTIVE BOX
--------------------------------------------------------------------------------  Chief Complaint: No new symptoms    24 hour events/subjective:  Reviewed, noted blood chemistry and urine output      PAST HISTORY  --------------------------------------------------------------------------------  No significant changes to PMH, PSH, FHx, SHx, unless otherwise noted    ALLERGIES & MEDICATIONS  --------------------------------------------------------------------------------  Allergies    No Known Allergies    Intolerances      Standing Inpatient Medications  acetaminophen     Tablet .. 650 milliGRAM(s) Oral once  amLODIPine   Tablet 10 milliGRAM(s) Oral daily  atorvastatin 40 milliGRAM(s) Oral at bedtime  calcium acetate 667 milliGRAM(s) Oral three times a day with meals  carvedilol 3.125 milliGRAM(s) Oral every 12 hours  dextrose 5%. 1000 milliLiter(s) IV Continuous <Continuous>  dextrose 5%. 1000 milliLiter(s) IV Continuous <Continuous>  dextrose 50% Injectable 25 Gram(s) IV Push once  dextrose 50% Injectable 12.5 Gram(s) IV Push once  dextrose 50% Injectable 25 Gram(s) IV Push once  diphenhydrAMINE 25 milliGRAM(s) Oral once  diVALproex  milliGRAM(s) Oral <User Schedule>  diVALproex  milliGRAM(s) Oral <User Schedule>  glucagon  Injectable 1 milliGRAM(s) IntraMuscular once  insulin lispro (ADMELOG) corrective regimen sliding scale   SubCutaneous three times a day before meals  insulin lispro (ADMELOG) corrective regimen sliding scale   SubCutaneous at bedtime  insulin lispro Injectable (ADMELOG) 2 Unit(s) SubCutaneous three times a day before meals  levETIRAcetam 250 milliGRAM(s) Oral two times a day  levothyroxine 50 MICROGram(s) Oral daily  melatonin 5 milliGRAM(s) Oral at bedtime  methylPREDNISolone sodium succinate Injectable 60 milliGRAM(s) IV Push once  mycophenolate mofetil 1 Gram(s) Oral <User Schedule>  nystatin    Suspension 241494 Unit(s) Swish and Swallow four times a day  pantoprazole    Tablet 40 milliGRAM(s) Oral before breakfast  polyethylene glycol 3350 17 Gram(s) Oral every 24 hours  predniSONE   Tablet   Oral   senna 2 Tablet(s) Oral at bedtime  sodium bicarbonate 1300 milliGRAM(s) Oral every 12 hours  tacrolimus ER Tablet (ENVARSUS XR) 5 milliGRAM(s) Oral <User Schedule>  trimethoprim   80 mG/sulfamethoxazole 400 mG 1 Tablet(s) Oral daily  valGANciclovir 450 milliGRAM(s) Oral <User Schedule>    PRN Inpatient Medications  chlorhexidine 2% Cloths 1 Application(s) Topical daily PRN  dextrose Oral Gel 15 Gram(s) Oral once PRN  ondansetron Injectable 4 milliGRAM(s) IV Push every 6 hours PRN      REVIEW OF SYSTEMS  --------------------------------------------------------------------------------   Gen: No fevers/chills  Skin: No rashes  Head/Eyes/Ears/Mouth: No headache; No sore throat  Respiratory: No dyspnea, cough,   CV: No chest pain, PND, orthopnea  GI: controlled post op pain no nausea, vomiting  Transplant: No pain  : No increased frequency, dysuria, hematuria, nocturia  MSK: No joint pain/swelling; no back pain; no edema  Neuro: No dizziness/lightheadedness, weakness, seizures, numbness, tingling  Psych: No significant nervousness, anxiety, stress, depression    All other systems were reviewed and are negative, except as noted.    VITALS/PHYSICAL EXAM  --------------------------------------------------------------------------------  T(C): 36.6 (04-24-22 @ 08:33), Max: 36.7 (04-23-22 @ 17:30)  HR: 56 (04-24-22 @ 08:33) (56 - 69)  BP: 137/70 (04-24-22 @ 08:33) (110/58 - 161/82)  RR: 20 (04-24-22 @ 08:33) (18 - 20)  SpO2: 100% (04-24-22 @ 08:33) (94% - 100%)  Wt(kg): --        04-23-22 @ 07:01  -  04-24-22 @ 07:00  --------------------------------------------------------  IN: 1781.4 mL / OUT: 620 mL / NET: 1161.4 mL    04-24-22 @ 07:01  -  04-24-22 @ 10:53  --------------------------------------------------------  IN: 240 mL / OUT: 85 mL / NET: 155 mL      Physical Exam:  	Gen: NAD, well-appearing  	HEENT: no acute signs  	Pulm: CTA B/L  	CV: RRR, S1S2; no rub  	Abd: +BS, soft,  nondistended                      Transplant: healing incision  	: Miranda- being removed   	UE: no acute signs  	LE: no edema  	Neuro: No asterixis, speech: normal  	Psych: Normal affect and mood  	Skin: Warm, without rashes      LABS/STUDIES  --------------------------------------------------------------------------------              8.6    9.47  >-----------<  117      [04-24-22 @ 06:21]              28.5     129  |  92  |  74  ----------------------------<  262      [04-24-22 @ 06:18]  5.5   |  18  |  5.07        Ca     7.6     [04-24-22 @ 06:18]      Mg     2.2     [04-24-22 @ 06:18]      Phos  6.2     [04-24-22 @ 06:18]    TPro  5.8  /  Alb  2.7  /  TBili  0.2  /  DBili  x   /  AST  22  /  ALT  6   /  AlkPhos  87  [04-24-22 @ 06:18]          Creatinine Trend:  SCr 5.07 [04-24 @ 06:18]  SCr 3.72 [04-23 @ 06:12]  SCr 6.56 [04-22 @ 20:34]  SCr 6.21 [04-22 @ 14:58]  SCr 5.62 [04-22 @ 05:43]    Tacrolimus (), Serum: <2.0 ng/mL (04-23 @ 07:54)  Tacrolimus (), Serum: <2.0 ng/mL (04-22 @ 17:08)         Iron 55, TIBC 191, %sat 29      [02-04-22 @ 00:04]  Ferritin 1287      [02-04-22 @ 00:04]  PTH -- (Ca 9.2)      [02-05-22 @ 10:13]   294  HbA1c 6.0      [02-21-20 @ 08:53]  Lipid: chol 118, TG 54, HDL 59, LDL --      [06-27-21 @ 09:44]    HBsAb 15129.2      [04-21-22 @ 14:13]  HBsAg Nonreact      [04-21-22 @ 14:13]  HBcAb Reactive      [04-21-22 @ 14:13]  HCV 0.25, Nonreact      [04-21-22 @ 14:13]  HIV Nonreact      [04-21-22 @ 14:47]

## 2022-04-24 NOTE — PHYSICAL THERAPY INITIAL EVALUATION ADULT - PERTINENT HX OF CURRENT PROBLEM, REHAB EVAL
66 year old male with history of HTN, HLD, DM (on insulin), ESRD (2/2 DM and HTN) on HD via permacath since 3yrs ago MWF, afib (on eliquis 2.5mg BID), hx of stroke (2019 2/2 afib), focal seizure (on depakote, keppra), hx of CABG (2020), hx of nonbleeding gastric/duodenal ulcer (EGD 2/9/22, on protonix) presents for DDRT. Makes minimal urine.

## 2022-04-24 NOTE — PHYSICAL THERAPY INITIAL EVALUATION ADULT - GAIT DEVIATIONS NOTED, PT EVAL
decreased pedro/increased time in double stance/decreased step length/decreased stride length/decreased weight-shifting ability

## 2022-04-24 NOTE — PHYSICAL THERAPY INITIAL EVALUATION ADULT - BED MOBILITY LIMITATIONS, REHAB EVAL
In person office visit scheduled. decreased ability to use arms for pushing/pulling/decreased ability to use legs for bridging/pushing

## 2022-04-24 NOTE — PROGRESS NOTE ADULT - ASSESSMENT
This is a 66 year old male with history of HTN, HLD, DM (on insulin), ESRD (2/2 DM and HTN) on HD via permacath since 3yrs ago MWF, afib (on eliquis 2.5mg BID), hx of stroke (2019 2/2 afib), focal seizure (on depakote, keppra), hx of CABG (2020), hx of nonbleeding gastric/duodenal ulcer (EGD 2/9/22, on protonix) presents for DDRT. Nephrology consulted for renal failure.     A/P   DDRT on 4/21/22.   Last HD on 4/22/22  transplant team on board   Immunosuppressant as per transplant team    HD consent in the cart   Planned for repeat lab today and possible HD if K is higher  Monitor renal function and urine output closely.     HTN   Fluctuating  monitor BP closely     Anemia:  Monitor Hb

## 2022-04-24 NOTE — OCCUPATIONAL THERAPY INITIAL EVALUATION ADULT - GENERAL OBSERVATIONS, REHAB EVAL
Pt received semisupine in bed, A+Ox4, alice speaking however able to communicate efficiently in English, PIV, cont pulse ox, BP cuff, +tele

## 2022-04-24 NOTE — DISCHARGE NOTE PROVIDER - HOSPITAL COURSE
66 year old male with history of HTN, HLD, DM (on insulin), ESRD (2/2 DM and HTN) on HD via permacath since 3yrs ago MWF, afib (on eliquis 2.5mg BID), hx of stroke (2019 2/2 afib), focal seizure (on depakote, keppra), hx of CABG (2020), hx of nonbleeding gastric/duodenal ulcer (EGD 2/9/22, on protonix) presents for potential DDRT on 4/21/2022. He was taken to the OR and underwent a DCD DDKT 4/21/2022 (58 y/o M, KDPI 82%, PHS high risk, terminal Cr 1.34, CMV pos, EBV pos, HCV OPAL neg) with Thymoglobulin induction, 1a/1v/1u + stent. Post-op renal ultrasound (4/22) with well perfused right lower quadrant renal transplant. He developed hyperkalemia, treated medically with D50/insulin while in PACU and on POD#1. ****      Immunosuppression:  -Thymoglobulin 6mg/kg induction   -Envarsus ***  -MMF 1g BID   -Pred 5mg daily     PPX:  -Bactrim SS daily  -Valcyte 450mg MWF (D+/R+)  -Nystatin QID  -Protonix (hx GI ulcer)    DM:   -Lantus ***  -Humalog Lispro *** 66 year old male with history of HTN, HLD, DM (on insulin), ESRD (2/2 DM and HTN) on HD via permacath since 3yrs ago MWF, afib (on eliquis 2.5mg BID), hx of stroke (2019 2/2 afib), focal seizure (on depakote, keppra), hx of CABG (2020), hx of nonbleeding gastric/duodenal ulcer (EGD 2/9/22, on protonix) presents for potential DDRT on 4/21/2022. He was taken to the OR and underwent a DCD DDKT 4/21/2022 (56 y/o M, KDPI 82%, PHS high risk, terminal Cr 1.34, CMV pos, EBV pos, HCV OPAL neg) with Thymoglobulin induction, 1a/1v/1u + stent. Post-op renal ultrasound (4/22) with well perfused right lower quadrant renal transplant. He developed hyperkalemia, treated medically with D50/insulin while in PACU and on POD#1. Patient was restarted on home Anti-epileptic medications. Minimal urine output with elevated K/BUN- DGF received HD on POD#1 (4/22). UOP improved on POD#3 into 4, with tucker removed and he passed his TOV.       Immunosuppression:  -Thymoglobulin 6mg/kg induction   -Envarsus ***  -MMF 1g BID   -Pred 5mg daily     PPX:  -Bactrim SS daily  -Valcyte 450mg MWF (D+/R+)  -Nystatin QID  -Protonix (hx GI ulcer)    DM:   -Lantus ***  -Humalog Lispro *** 66 year old male with history of HTN, HLD, DM (on insulin), ESRD (2/2 DM and HTN) on HD via permacath since 3yrs ago MWF, afib (on eliquis 2.5mg BID), hx of stroke (2019 2/2 afib), focal seizure (on depakote, keppra), hx of CABG (2020), hx of nonbleeding gastric/duodenal ulcer (EGD 2/9/22, on protonix) presents for potential DDRT on 4/21/2022.    He underwent a DCD DDRT ( 1A/1V/1U stented) on 4/21/2022 with Thymoglobulin Induction   - Donor: Donor: 58 y/o M, KDPI 82%, PHS high risk, terminal Cr 1.34, CMV pos, EBV pos, HCV OPAL neg)   - Graft: post op renal doppler with  good perfusion; course c/b delayed graft function requiring HD inpatient and on discharge.  - Good urine output, on Lasix 80mg PO BID  - Miranda removed 5/24, passed void trial  - Tolerated regular diet, had bowel function  - Pain well controlled    - h/o Seizures: resumed all home medications  - A fib: resumed Eliquis; discuss re-starting Plavix outpt  - HTN: stable on Norvasc and Coreg  - DM: Lispro 9u tid, Lantus 6u    He was evaluated by the multi-disciplinary team including surgeon, nephrologist, ACP, pharmacist, nutrition, social work, and nursing and deemed stable for discharge with the following plan:    Immunosuppression:  - Inducted with Thymoglobulin 6mg/kg   -Envarsus 2mg daily  -MMF 1g BID   -Pred 5mg daily     PPX:  -Bactrim SS daily  -Valcyte 450mg MWF (D+/R+)  -Nystatin QID  -Protonix (hx GI ulcer)    Follow up:   - Resumed HD on discharge   - Labs on Friday 4/29   - Follow up in clinic Monday 5/2      Renal txp recipient:   - Avoid heavy lifting aything over 5lbs for six weeks following your surgery date. Do NOT drive a car or operate machinery unless cleared by your transplant surgeon during your follow up visit. Avoid straining, use stool softners as needed. Stop stool softners if diarrhea develops.   - Call transplant clinic if you develop fever, increased abdominal pain, redness/swelling or bleeding around your incision site  - Call transplant clinic if you have difficulty urinating, notice decrease in urine output or blood in urine.   - Follow FOOD SAFETY instructions provided to you in your patient education guide booklet   - Bathing: Shower is allowed, you can let soap and water flow over your incision; do NOT rub the area. Bath is NOT allowed until your incision is healed and you have been cleared by your transplant surgeon.     Immunosupression  - Transplant recipients are at risk for developing infection because immune system is lowered due to transplant medications.   - Avoid contact with sick people; practice good hand hygiene;   - Take medications as directed by your translant team. Never stop taking medications unless instructed by your transplant physician.  - Do NOT double up medication dose if you missed your dose; call the transplant office for instructions.      HTN  Be sure to follow a low salt diet, avoid caffeine, reduce alcohol intake.  If you have been prescribed antihypertensive medications to control your blood pressure, be sure to take them every day as prescribed and do not miss any doses, the medications do not work if they are not taken consistently. Follow up with your Primary Care Doctor and have your Blood Pressure checked regularly.     DM  If you have diabetes, you may need to monitor your blood sugar more frequently after transplant. Uncongrolled blood sugar levels can lead to poor wound healing and other complications. Follow a low carb and low sugar diet. Continue to take all anti-diabetic medications/insulin as prescribed. Follow up with your Primary Care Doctor regularly for blood sugar/A1c checks. Follow up with an opthalmologist and a podiatrist on an annual basis.

## 2022-04-25 NOTE — PROGRESS NOTE ADULT - ASSESSMENT
Assessment: 66 year old male with history of HTN, HLD, DM (on insulin), ESRD (2/2 DM and HTN) on HD via permacath since 3yrs ago MWF, afib (on eliquis 2.5mg BID), hx of stroke (2019 2/2 afib), focal seizure (on depakote, keppra), hx of CABG (2020), hx of nonbleeding gastric/duodenal ulcer (EGD 2/9/22, on protonix) presents for potential DDRT. Makes minimal urine. Last HD: 4/20 (nephrologist, Dr. Barrientos). He is now s/p DCD DDRT with thymo induction and STENT placement.     [] s/p DDRT w/ Thymo Induction- POD4  -DGF: HD 4/22 2/2 hyperkalemia/low UOP  -Improvement in UOP   -Start lasix 80mg po bid   -May need HD if no improvement in K post-lasix   -strict I&Os  -CC renal diet  -DC ALINA  -SCDs  -PRN Pain regimen with Tylenol, Tramadol   -Bowel regimen  -Daily CBC, CMP, Mg/Phos, Coags, Tacro level    [] Immunosuppression  -Thymoglobulin induction  completed  -Envarsus held level 20.8, MMF 1g BID, Steroid taper   -Daily FK level   -PPX: Valcyte, Nystatin, Bactrim, Pepcid     [] HTN/AFib:  -Continue half home dose Coreg 3.125 BID  -Continue home dose Atorvastatin, Norvasc 10   -Holding home Hydralazine, Telmisartan   -Holding Eliquis in zackery-op period     [] IDDM  -POCT glucose q6h  -Continue mISS  -Was on mISS at home     [] Hx CVA (residual bilateral weakness) c/b Seizure Disorder  -Continue home Depakote and Keppra   -PT/OT consult to assist in ambulation     [] Hypothyroidism  -Continue home Synthroid

## 2022-04-25 NOTE — PROGRESS NOTE ADULT - NUTRITIONAL ASSESSMENT
Diet, Consistent Carbohydrate Renal w/Evening Snack (04-23-22 @ 09:46) [Active]

## 2022-04-25 NOTE — PROGRESS NOTE ADULT - SUBJECTIVE AND OBJECTIVE BOX
[facility] DIVISION OF KIDNEY DISEASES AND HYPERTENSION -- FOLLOW UP NOTE  --------------------------------------------------------------------------------    CHAD DUNBAR was seen and examined at bedside.     24 hour events:  subjective: No acute major events overnight. Pt continues to have suboptimal urine output, 850 cc in last 24 hours. SCr elevated at 5.4, with serum K of 5.6.      Standing Inpatient Medications  amLODIPine   Tablet 10 milliGRAM(s) Oral daily  atorvastatin 40 milliGRAM(s) Oral at bedtime  calcium acetate 667 milliGRAM(s) Oral three times a day with meals  carvedilol 3.125 milliGRAM(s) Oral every 12 hours  dextrose 5%. 1000 milliLiter(s) IV Continuous <Continuous>  dextrose 5%. 1000 milliLiter(s) IV Continuous <Continuous>  dextrose 50% Injectable 25 Gram(s) IV Push once  dextrose 50% Injectable 12.5 Gram(s) IV Push once  dextrose 50% Injectable 25 Gram(s) IV Push once  diVALproex  milliGRAM(s) Oral <User Schedule>  diVALproex  milliGRAM(s) Oral <User Schedule>  furosemide    Tablet 80 milliGRAM(s) Oral two times a day  glucagon  Injectable 1 milliGRAM(s) IntraMuscular once  insulin glargine Injectable (LANTUS) 14 Unit(s) SubCutaneous <User Schedule>  insulin lispro (ADMELOG) corrective regimen sliding scale   SubCutaneous three times a day before meals  insulin lispro (ADMELOG) corrective regimen sliding scale   SubCutaneous at bedtime  insulin lispro Injectable (ADMELOG) 7 Unit(s) SubCutaneous three times a day before meals  levETIRAcetam 250 milliGRAM(s) Oral two times a day  levothyroxine 50 MICROGram(s) Oral daily  melatonin 5 milliGRAM(s) Oral at bedtime  melatonin 3 milliGRAM(s) Oral at bedtime  mycophenolate mofetil 1 Gram(s) Oral <User Schedule>  nystatin    Suspension 460008 Unit(s) Swish and Swallow four times a day  pantoprazole    Tablet 40 milliGRAM(s) Oral before breakfast  polyethylene glycol 3350 17 Gram(s) Oral every 24 hours  predniSONE   Tablet 20 milliGRAM(s) Oral every 12 hours  senna 2 Tablet(s) Oral at bedtime  sodium bicarbonate 1300 milliGRAM(s) Oral every 12 hours  trimethoprim   80 mG/sulfamethoxazole 400 mG 1 Tablet(s) Oral daily  valGANciclovir 450 milliGRAM(s) Oral <User Schedule>    PRN Inpatient Medications  chlorhexidine 2% Cloths 1 Application(s) Topical daily PRN  dextrose Oral Gel 15 Gram(s) Oral once PRN  ondansetron Injectable 4 milliGRAM(s) IV Push every 6 hours PRN        VITALS/PHYSICAL EXAM  --------------------------------------------------------------------------------  T(C): 36.4 (04-25-22 @ 08:43), Max: 36.7 (04-25-22 @ 01:00)  HR: 52 (04-25-22 @ 08:43) (52 - 69)  BP: 118/60 (04-25-22 @ 08:43) (118/60 - 157/71)  RR: 18 (04-25-22 @ 08:43) (18 - 20)  SpO2: 99% (04-25-22 @ 08:43) (99% - 100%)  Wt(kg): --        04-24-22 @ 07:01  -  04-25-22 @ 07:00  --------------------------------------------------------  IN: 1826.9 mL / OUT: 810 mL / NET: 1016.9 mL    04-25-22 @ 07:01  -  04-25-22 @ 11:11  --------------------------------------------------------  IN: 300 mL / OUT: 125 mL / NET: 175 mL      Physical Exam:  Awake and alert, comfortable  No icterus   Moist oral mucosa, no thrush, + missing teeth   Left IJ tunneled dialysis catheter   Lungs clear b/l  Regular S1 and S2, no murmur  Abdomen soft, non distended, non tender, RLQ transplant site non tender  LLE edema +1 (s/p recent ankle sprain),  no RLE edema   2+ pedal pulses       LABS/STUDIES  --------------------------------------------------------------------------------              9.8    6.43  >-----------<  117      [04-25-22 @ 06:20]              31.3     125  |  89  |  93  ----------------------------<  248      [04-25-22 @ 06:17]  5.6   |  15  |  5.48        Ca     7.2     [04-25-22 @ 06:17]      Mg     2.2     [04-25-22 @ 06:17]      Phos  6.2     [04-25-22 @ 06:17]    Creatinine Trend:  SCr 5.48 [04-25 @ 06:17]  SCr 5.35 [04-24 @ 14:49]  SCr 5.07 [04-24 @ 06:18]  SCr 3.72 [04-23 @ 06:12]  SCr 6.56 [04-22 @ 20:34]    Tacrolimus (), Serum: 20.8 ng/mL (04-25 @ 08:34)  Tacrolimus (), Serum: 12.4 ng/mL (04-24 @ 07:48)  Tacrolimus (), Serum: <2.0 ng/mL (04-23 @ 07:54)  Tacrolimus (), Serum: <2.0 ng/mL (04-22 @ 17:08)    CAPILLARY BLOOD GLUCOSE      POCT Blood Glucose.: 327 mg/dL (25 Apr 2022 08:27)  POCT Blood Glucose.: 202 mg/dL (24 Apr 2022 21:09)  POCT Blood Glucose.: 233 mg/dL (24 Apr 2022 17:04)  POCT Blood Glucose.: 270 mg/dL (24 Apr 2022 14:08)  POCT Blood Glucose.: 291 mg/dL (24 Apr 2022 13:06)      Iron 55, TIBC 191, %sat 29      [02-04-22 @ 00:04]  Ferritin 1287      [02-04-22 @ 00:04]  PTH -- (Ca 9.2)      [02-05-22 @ 10:13]   294  HbA1c 6.0      [02-21-20 @ 08:53]  Lipid: chol 118, TG 54, HDL 59, LDL --      [06-27-21 @ 09:44]    HBsAb 91209.2      [04-21-22 @ 14:13]  HBsAg Nonreact      [04-21-22 @ 14:13]  HBcAb Reactive      [04-21-22 @ 14:13]  HCV 0.25, Nonreact      [04-21-22 @ 14:13]  HIV Nonreact      [04-21-22 @ 14:47]

## 2022-04-25 NOTE — PROGRESS NOTE ADULT - ATTENDING COMMENTS
I was present during and reviewed clinical and lab data as well as assessment and plan as documented by the housestaff as noted. Please contact if any additional questions with any change in clinical condition or on availability of any additional information or reports.

## 2022-04-25 NOTE — PROGRESS NOTE ADULT - SUBJECTIVE AND OBJECTIVE BOX
[facility]   MD NINA MASON MD, PA INJELLIOTT SKYE MUNOZ    TEL:  OFFICE: [phone]    From 5pm-7am Answering Service [phone]    -- RENAL FOLLOW UP NOTE ---Date of Service 04-25-22 @ 14:35    Patient is a 66y old  Male who presents with a chief complaint of ESRD (25 Apr 2022 12:30)      Patient seen and examined at bedside. No chest pain/sob    VITALS:  T(F): 96 (04-25-22 @ 12:40), Max: 98 (04-25-22 @ 01:00)  HR: 52 (04-25-22 @ 12:40)  BP: 122/58 (04-25-22 @ 12:40)  RR: 18 (04-25-22 @ 12:40)  SpO2: 100% (04-25-22 @ 12:40)  Wt(kg): --    04-24 @ 07:01  -  04-25 @ 07:00  --------------------------------------------------------  IN: 1826.9 mL / OUT: 810 mL / NET: 1016.9 mL    04-25 @ 07:01  -  04-25 @ 14:35  --------------------------------------------------------  IN: 680 mL / OUT: 175 mL / NET: 505 mL          PHYSICAL EXAM:  Constitutional: NAD  Neck: No JVD  Respiratory: CTAB, no wheezes, rales or rhonchi  Cardiovascular: S1, S2, RRR  Gastrointestinal: BS+, soft, NT/ND  Extremities: No peripheral edema    Hospital Medications:   MEDICATIONS  (STANDING):  amLODIPine   Tablet 10 milliGRAM(s) Oral daily  atorvastatin 40 milliGRAM(s) Oral at bedtime  calcium acetate 667 milliGRAM(s) Oral three times a day with meals  carvedilol 3.125 milliGRAM(s) Oral every 12 hours  dextrose 5%. 1000 milliLiter(s) (50 mL/Hr) IV Continuous <Continuous>  dextrose 5%. 1000 milliLiter(s) (100 mL/Hr) IV Continuous <Continuous>  dextrose 50% Injectable 25 Gram(s) IV Push once  dextrose 50% Injectable 12.5 Gram(s) IV Push once  dextrose 50% Injectable 25 Gram(s) IV Push once  diVALproex  milliGRAM(s) Oral <User Schedule>  diVALproex  milliGRAM(s) Oral <User Schedule>  furosemide    Tablet 80 milliGRAM(s) Oral two times a day  glucagon  Injectable 1 milliGRAM(s) IntraMuscular once  insulin glargine Injectable (LANTUS) 7 Unit(s) SubCutaneous <User Schedule>  insulin lispro (ADMELOG) corrective regimen sliding scale   SubCutaneous three times a day before meals  insulin lispro (ADMELOG) corrective regimen sliding scale   SubCutaneous at bedtime  insulin lispro Injectable (ADMELOG) 7 Unit(s) SubCutaneous three times a day before meals  levETIRAcetam 250 milliGRAM(s) Oral two times a day  levothyroxine 50 MICROGram(s) Oral daily  melatonin 5 milliGRAM(s) Oral at bedtime  melatonin 3 milliGRAM(s) Oral at bedtime  mycophenolate mofetil 1 Gram(s) Oral <User Schedule>  nystatin    Suspension 729513 Unit(s) Swish and Swallow four times a day  pantoprazole    Tablet 40 milliGRAM(s) Oral before breakfast  polyethylene glycol 3350 17 Gram(s) Oral every 24 hours  predniSONE   Tablet 20 milliGRAM(s) Oral every 12 hours  senna 2 Tablet(s) Oral at bedtime  sodium bicarbonate 1300 milliGRAM(s) Oral every 12 hours  trimethoprim   80 mG/sulfamethoxazole 400 mG 1 Tablet(s) Oral daily  valGANciclovir 450 milliGRAM(s) Oral <User Schedule>    Tacrolimus (), Serum: 20.8 ng/mL (04-25 @ 08:34)    LABS:  04-25    125<L>  |  89<L>  |  93<H>  ----------------------------<  248<H>  5.6<H>   |  15<L>  |  5.48<H>    Ca    7.2<L>      25 Apr 2022 06:17  Phos  6.2     04-25  Mg     2.2     04-25    TPro  5.5<L>  /  Alb  2.4<L>  /  TBili  0.3  /  DBili      /  AST  21  /  ALT  5<L>  /  AlkPhos  88  04-25    Creatinine Trend: 5.48 <--, 5.35 <--, 5.07 <--, 3.72 <--, 6.56 <--, 6.21 <--, 5.62 <--, 5.64 <--, 5.14 <--, 5.08 <--    Albumin, Serum: 2.4 g/dL (04-25 @ 06:17)  Phosphorus Level, Serum: 6.2 mg/dL (04-25 @ 06:17)  Phosphorus Level, Serum: 6.3 mg/dL (04-24 @ 14:49)                              9.8    6.43  )-----------( 117      ( 25 Apr 2022 06:20 )             31.3     Urine Studies:      Iron 55, TIBC 191, %sat 29      [02-04-22 @ 00:04]  Ferritin 1287      [02-04-22 @ 00:04]  PTH -- (Ca 9.2)      [02-05-22 @ 10:13]   294  HbA1c 6.0      [02-21-20 @ 08:53]  Lipid: chol 118, TG 54, HDL 59, LDL --      [06-27-21 @ 09:44]    HBsAb 71791.2      [04-21-22 @ 14:13]  HBsAg Nonreact      [04-21-22 @ 14:13]  HBcAb Reactive      [04-21-22 @ 14:13]  HCV 0.25, Nonreact      [04-21-22 @ 14:13]  HIV Nonreact      [04-21-22 @ 14:47]      RADIOLOGY & ADDITIONAL STUDIES:

## 2022-04-25 NOTE — PROGRESS NOTE ADULT - SUBJECTIVE AND OBJECTIVE BOX
Transplant Surgery - Multidisciplinary Rounds  --------------------------------------------------------------  DDRT Date: 22         POD#3    Present: Patient seen and examined with multidisciplinary team including Transplant Surgeon: Dr. Davis. Nephrologist: Dr. Alfred, pharmacist VENICE Lim, PHIL Styles during am rounds.  Disciplines not in attendance will be notified of the plan.     HPI: 66 year old male with history of HTN, HLD, DM (on insulin), ESRD (2/2 DM and HTN) on HD via permacath since 3yrs ago MWF, afib (on eliquis 2.5mg BID), hx of stroke ( afib), focal seizure (on depakote, keppra), hx of CABG (), hx of nonbleeding gastric/duodenal ulcer (EGD 22, on protonix) presents for potential DDRT. Makes minimal urine. Last HD:  (nephrologist, Dr. Barrientos)     -Now s/p DCD DDRT with thymo induction and STENT placement w/ 22.     OR details: DCD DDRT with Thymo induction with stent placement  1 artery, 1 vein, 1 ureter  Cold ischemia time 22 hr      Recipient:  Srinivasan Castelan  /Age:    1955  65 y/o M  Hx:  DM, HTN, CAD s/p Stent (2019), CVA             Covid Vaccine:   Moderna x 3  Nephrologist: Dr. Sneed  CPRA:    0% Luminex dated 3/2/2022  ABO:      B  CMV:  Positive    EBV Status:  Positive  Last HD:  2022  NPO:  Yes  ALLERGIES: NKDA    Covid Testing: Negative done on admission 22  Pt Location-[address] [place] [address] -Treatment room  Financially cleared- Yes  Recipient OR- 22  1500 pm  Surgeon-Dr. Barber       Donor (import): [facility]    Donor ID: [id number]   Match: [id number]  OPO: [facility]  Age:  57 y.o M  ABO:  B  High Risk: Yes (MSM)    KDPI: 82%  DCD: Yes   22min WIT  COD:  CVA ICH  X Clamp Time: 2022 20:20  Medical Hx:  ETOH abuse, polysubstance abuse  Terminal Cr:  1.34  Covid Testing: Negative NP 2022  CMV-Positive      EBV-Positive  HepBcAb-Negative  Hepatitis C-OPAL- Negative  Hepatitis C ab-Negative     MISMATCH: 1,2,2  Kidney ETA to [facility]:  22 1500  Kidney Laterality: Right  X-match –virtual negative    Interval Events:  -POD#4 DCD DDRT with stent  -Postop US: slight increase in flow at iliac a. anastomosis  -DGF: HD  evening due to K-6.0 with low UOP  -Lasix 80mg iv x1, tucker removed passed TOV, UOP 810ml  -ALINA with 50ml/24hrs serosang output                Immunosuppression:         Induction: Thymoglobulin completed yesterday  Immunosuppression: Env by level, MMF 1g BID, pred taper   Ongoing monitoring for signs of rejection     Potential Discharge date: Pending clinical improvement.   Education:  Medications  Plan of care:  See Below       MEDICATIONS  (STANDING):  amLODIPine   Tablet 10 milliGRAM(s) Oral daily  atorvastatin 40 milliGRAM(s) Oral at bedtime  calcium acetate 667 milliGRAM(s) Oral three times a day with meals  carvedilol 3.125 milliGRAM(s) Oral every 12 hours  dextrose 5%. 1000 milliLiter(s) (50 mL/Hr) IV Continuous <Continuous>  dextrose 5%. 1000 milliLiter(s) (100 mL/Hr) IV Continuous <Continuous>  dextrose 50% Injectable 25 Gram(s) IV Push once  dextrose 50% Injectable 12.5 Gram(s) IV Push once  dextrose 50% Injectable 25 Gram(s) IV Push once  diVALproex  milliGRAM(s) Oral <User Schedule>  diVALproex  milliGRAM(s) Oral <User Schedule>  furosemide    Tablet 80 milliGRAM(s) Oral two times a day  glucagon  Injectable 1 milliGRAM(s) IntraMuscular once  insulin glargine Injectable (LANTUS) 7 Unit(s) SubCutaneous <User Schedule>  insulin lispro (ADMELOG) corrective regimen sliding scale   SubCutaneous three times a day before meals  insulin lispro (ADMELOG) corrective regimen sliding scale   SubCutaneous at bedtime  insulin lispro Injectable (ADMELOG) 7 Unit(s) SubCutaneous three times a day before meals  levETIRAcetam 250 milliGRAM(s) Oral two times a day  levothyroxine 50 MICROGram(s) Oral daily  melatonin 5 milliGRAM(s) Oral at bedtime  melatonin 3 milliGRAM(s) Oral at bedtime  mycophenolate mofetil 1 Gram(s) Oral <User Schedule>  nystatin    Suspension 511730 Unit(s) Swish and Swallow four times a day  pantoprazole    Tablet 40 milliGRAM(s) Oral before breakfast  polyethylene glycol 3350 17 Gram(s) Oral every 24 hours  predniSONE   Tablet 20 milliGRAM(s) Oral every 12 hours  senna 2 Tablet(s) Oral at bedtime  sodium bicarbonate 1300 milliGRAM(s) Oral every 12 hours  trimethoprim   80 mG/sulfamethoxazole 400 mG 1 Tablet(s) Oral daily  valGANciclovir 450 milliGRAM(s) Oral <User Schedule>    MEDICATIONS  (PRN):  chlorhexidine 2% Cloths 1 Application(s) Topical daily PRN cloth  dextrose Oral Gel 15 Gram(s) Oral once PRN Blood Glucose LESS THAN 70 milliGRAM(s)/deciliter  ondansetron Injectable 4 milliGRAM(s) IV Push every 6 hours PRN Nausea and/or Vomiting      PAST MEDICAL & SURGICAL HISTORY:  Hypertension    Diabetes    Dyslipidemia    CAD (Coronary Artery Disease)  with Stents in 2009 and 2019, s/p off-pump C3L on 20    Hypothyroidism    CVA (cerebral vascular accident)  19 with residual bilateral weakness    Anemia    PEG (percutaneous endoscopic gastrostomy) status  removed 2020    Intubation of airway performed without difficulty  Dec 2019  CVA c/b status epilepticus requiring intubation and PEG in 2019-    ESRD on dialysis  M/W/F    Seizures  after CVA, last seizure was last week 22    History of insertion of stent into coronary artery bypass graft   and     S/P CABG x 3  off pump C3L on 20        Vital Signs Last 24 Hrs  T(C): 36.4 (2022 08:43), Max: 36.7 (2022 01:00)  T(F): 97.5 (2022 08:43), Max: 98 (2022 01:00)  HR: 52 (2022 08:43) (52 - 58)  BP: 118/60 (2022 08:43) (118/60 - 157/71)  BP(mean): 88 (2022 17:05) (88 - 88)  RR: 18 (2022 08:43) (18 - 18)  SpO2: 99% (2022 08:43) (99% - 100%)    I&O's Summary    2022 07:01  -  2022 07:00  --------------------------------------------------------  IN: 1826.9 mL / OUT: 810 mL / NET: 1016.9 mL    2022 07:01  -  2022 12:35  --------------------------------------------------------  IN: 300 mL / OUT: 175 mL / NET: 125 mL                              9.8    6.43  )-----------( 117      ( 2022 06:20 )             31.3         125<L>  |  89<L>  |  93<H>  ----------------------------<  248<H>  5.6<H>   |  15<L>  |  5.48<H>    Ca    7.2<L>      2022 06:17  Phos  6.2       Mg     2.2         TPro  5.5<L>  /  Alb  2.4<L>  /  TBili  0.3  /  DBili  x   /  AST  21  /  ALT  5<L>  /  AlkPhos  88      Tacrolimus (), Serum: 20.8 ng/mL ( @ 08:34)                Review of systems  Gen: No weight changes, fatigue, fevers/chills, weakness  Skin: No rashes  Head/Eyes/Ears/Mouth: No headache; Normal hearing; Normal vision w/o blurriness; No sinus pain/discomfort, sore throat  Respiratory: No dyspnea, cough, wheezing, hemoptysis  CV: No chest pain, PND, orthopnea  GI: C/O mild abdominal pain at surgical site. no diarrhea, constipation, nausea, vomiting, melena, hematochezia  : No increased frequency, dysuria, hematuria, nocturia  MSK: No joint pain/swelling; no back pain; no edema  Neuro: No dizziness/lightheadedness, weakness, seizures, numbness, tingling  Heme: No easy bruising or bleeding  Endo: No heat/cold intolerance  Psych: No significant nervousness, anxiety, stress, depression  All other systems were reviewed and are negative, except as noted.      PHYSICAL EXAM:  Constitutional: Well developed / well nourished  Eyes: PERRLA  ENMT: nc/at, no thrush  Neck: supple, central line  Respiratory: CTA B/L  Cardiovascular: RRR  Gastrointestinal: Soft abdomen, ND, appropriate incisional TTP, c/d/i, ALINA serosanguinous output  Genitourinary: Voiding spontaneously   Extremities: SCD's in place and working bilaterally  Vascular: Palpable DP pulses bilaterally.   Neurological: A&O x3  Skin: no rashes, ulcerations, lesions  Musculoskeletal: Moving all extremities  Psychiatric: Responsive

## 2022-04-25 NOTE — PROGRESS NOTE ADULT - ASSESSMENT
"        Dorie Wadsworth   2017 1:15 PM   Anticoagulation Visit   MRN: [id number]    Department:  [facility]   Dept Phone:  [phone]    Description:  Female : 1938   Provider:  FERNLEY PHARMACIST           Allergies as of 2017     Allergen Noted Reactions    Cardizem 10/19/2012   Swelling    Doxycycline 10/22/2013       Swollen lips.    Sulfa Drugs 2008       Makes tongue swell, severely    Zyvox 2011   Swelling    \"Whole mouth swelled up\"     Codeine 2008       Does not remember the reaction      Macrobid [Nitrofurantoin Monohydrate Macrocrystals] 2009       Does not remember the reaction      You were diagnosed with     Atrial flutter, unspecified type (CMS-HCC)   [2192782]       Paroxysmal atrial fibrillation (CMS-HCC)   [835015]         Vital Signs     Blood Pressure Pulse Smoking Status             113/58 mmHg 59 Never Smoker          Basic Information     Date Of Birth Sex Race Ethnicity Preferred Language    1938 Female White Non- English      Your appointments     2017  1:15 PM   Anti-Coag Routine with MAD[facility]HA PHARMACIST   [facility] [place]    [facility] [address] [phone]            Aug 15, 2017 11:15 AM   FOLLOW UP with Rose Whipple M.D.   [facility]    [address]  [address]  [facility] [id number] [phone]            2018  1:00 PM   Anti-Coag Routine with MADIHA PHARMACIST   [facility] ([place]    [address] [place] [id number] [phone]              Problem List              ICD-10-CM Priority Class Noted - Resolved    UTI (urinary tract infection) N39.0   2011 - Present    Hypothyroid E03.9   2011 - Present    Hypertension I10   2011 - Present    Hyperlipidemia E78.5   2011 - Present    TIA (transient ischemic attack) G45.9   " 3/11/2011 - Present    Atrial fibrillation (CMS-HCC) I48.91 Medium  1/23/2012 - Present    Vaginal bleeding N93.9   10/22/2012 - Present    History of hematuria (Chronic) Z87.448   Unknown - Present    MRSA carrier Z22.322   2/1/2014 - Present    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   2/1/2014 - Present    Atrial flutter (CMS-HCC) I48.92   5/18/2015 - Present    Anticoagulant long-term use Z79.01   8/11/2016 - Present    Enteritis K52.9 High  7/10/2017 - Present    Urinary tract infection N39.0 High  7/11/2017 - Present    HTN (hypertension) I10 Low  7/11/2017 - Present      Health Maintenance        Date Due Completion Dates    IMM DTaP/Tdap/Td Vaccine (1 - Tdap) 9/29/1956 ---    PAP SMEAR 9/29/1958 ---    MAMMOGRAM 9/29/1977 ---    COLONOSCOPY 9/29/1987 ---    IMM ZOSTER VACCINE 9/29/1997 ---    BONE DENSITY 9/29/2002 ---    IMM INFLUENZA (1) 9/1/2017 10/8/2016, 10/1/2016, 10/4/2014, 11/29/2013, 1/27/2012    IMM PNEUMOCOCCAL 65+ (ADULT) LOW/MEDIUM RISK SERIES (2 of 2 - PPSV23) 10/8/2017 10/8/2016            Results     POCT Protime      Component    INR    1.4    Comment:     ic valid [id number] exp 03/2018                        Current Immunizations     13-VALENT PCV PREVNAR 10/8/2016 12:03 PM    Influenza TIV (IM) 10/1/2016, 11/29/2013, 1/27/2012 10:00 AM    Influenza Vaccine 11/23/2010    Influenza Vaccine Adult HD 10/8/2016 12:02 PM, 10/4/2014    Pneumococcal Vaccine (UF)Historical Data 2/23/2010      Below and/or attached are the medications your provider expects you to take. Review all of your home medications and newly ordered medications with your provider and/or pharmacist. Follow medication instructions as directed by your provider and/or pharmacist. Please keep your medication list with you and share with your provider. Update the information when medications are discontinued, doses are changed, or new medications (including over-the-counter products) are added; and carry medication information at all times in the  This is a 66 year old male with history of HTN, HLD, DM (on insulin), ESRD (2/2 DM and HTN) on HD via permacath since 3yrs ago MWF, afib (on eliquis 2.5mg BID), hx of stroke (2019 2/2 afib), focal seizure (on depakote, keppra), hx of CABG (2020), hx of nonbleeding gastric/duodenal ulcer (EGD 2/9/22, on protonix) presents for DDRT. Nephrology consulted for renal failure.     A/P   DDRT on 4/21/22.   Last HD on 4/22/22  transplant team on board   Immunosuppressant as per transplant team     Labs reviewed. Serum k with 5.6 and SNa of 125.   HD today per transplant team  on Lasix 80 mg PO BID.   HD consent in the cart   Monitor renal function and urine output closely.     HTN   optimal   monitor BP closely     Anemia:  Monitor Hb  blood transfusion as needed      event of emergency situations     Allergies:  CARDIZEM - Swelling     DOXYCYCLINE - (reactions not documented)     SULFA DRUGS - (reactions not documented)     ZYVOX - Swelling     CODEINE - (reactions not documented)     MACROBID - (reactions not documented)               Medications  Valid as of: July 21, 2017 -  1:10 PM    Generic Name Brand Name Tablet Size Instructions for use    Apixaban (Tab) ELIQUIS 5mg Take 1 Tab by mouth 2 Times a Day.        Bimatoprost (Solution) LUMIGAN 0.01 % Place 1 Drop in both eyes every day. Both eyes        Biotin (Cap) Biotin 10 MG Take 1 Cap by mouth every day. Indications: **OTC**        Brimonidine Tartrate-Timolol (Solution) Brimonidine Tartrate-Timolol 0.2-0.5 % Place 1 Drop in both eyes 2 Times a Day.        Cefuroxime Axetil (Tab) CEFTIN 250 MG Take 250 mg by mouth 2 times a day. 10 DAYS        Furosemide (Tab) LASIX 20 MG Take 0.5 Tabs by mouth as needed.        Levothyroxine Sodium (Tab) SYNTHROID 25 MCG Take 25 mcg by mouth Every morning on an empty stomach.        Lovastatin (Tab) MEVACOR 10 MG Take 10 mg by mouth every day.        Metoprolol Succinate (TABLET SR 24 HR) TOPROL XL 25 MG Take 25 mg by mouth every day.        Multiple Vitamins-Minerals   Take 1 Tab by mouth 2 Times a Day.        Multiple Vitamins-Minerals (Tab) CENTRUM SILVER ULTRA WOMENS  Take 1 Tab by mouth every day.        Pantoprazole Sodium (Tablet Delayed Response) PROTONIX 40 MG Take 40 mg by mouth every day.        .                 Medicines prescribed today were sent to:     [facility] [address] [address]    Phone: [phone] Fax: [phone]    Open 24 Hours?: No      Medication refill instructions:       If your prescription bottle indicates you have medication refills left, it is not necessary to call your provider’s office. Please contact your pharmacy and they will refill your medication.    If your prescription bottle indicates you do  not have any refills left, you may request refills at any time through one of the following ways: The online [employer] Alert system (except Urgent Care), by calling your provider’s office, or by asking your pharmacy to contact your provider’s office with a refill request. Medication refills are processed only during regular business hours and may not be available until the next business day. Your provider may request additional information or to have a follow-up visit with you prior to refilling your medication.   *Please Note: Medication refills are assigned a new Rx number when refilled electronically. Your pharmacy may indicate that no refills were authorized even though a new prescription for the same medication is available at the pharmacy. Please request the medicine by name with the pharmacy before contacting your provider for a refill.        Warfarin Dosing Calendar   July 2017 Details    Sun Mon Tue Wed Thu Fri Sat           1                 2               3               4               5               6               7               8                 9               10               11               12               13               14               15                 16               17               18               19               20               21   1.4      See details      22                 23               24               25               26               27               28               29                 30               31                     Date Details   07/21 This INR check   INR: 1.4    valid [id number] exp 03/2018      Date of next INR: No date specified              [employer] Alert Access Code: [email or web address]  Expires: 8/12/2017 10:12 AM    [employer] Alert  A secure, online tool to manage your health information     [employer]’s [employer] Alert® is a secure, online tool that connects you to your personalized health information from the privacy of your home -- day or night - making it very easy for you  This is a 66 year old male with history of HTN, HLD, DM (on insulin), ESRD (2/2 DM and HTN) on HD via permacath since 3yrs ago MWF, afib (on eliquis 2.5mg BID), hx of stroke (2019 2/2 afib), focal seizure (on depakote, keppra), hx of CABG (2020), hx of nonbleeding gastric/duodenal ulcer (EGD 2/9/22, on protonix) presents for DDRT. Nephrology consulted for renal failure.     A/P   DDRT on 4/21/22.   Last HD on 4/22/22  transplant team on board   Immunosuppressant as per transplant team     Labs reviewed. Serum k with 5.6 and SNa of 125.   HD today  on Lasix 80 mg PO BID.   HD consent in the cart   Monitor renal function and urine output closely.     HTN   optimal   monitor BP closely     Anemia:  Monitor Hb  blood transfusion as needed      to manage your healthcare. Once the activation process is completed, you can even access your medical information using the [employer] christina, which is available for free in the [employer] store or Google Play store.     [employer] provides the following levels of access (as shown below):   My Chart Features   [facility] Doctor [facility]  Specialists [facility]  Urgent  Care Non-[facility]  Primary Care  Doctor   Email your healthcare team securely and privately 24/7 X X X    Manage appointments: schedule your next appointment; view details of past/upcoming appointments X      Request prescription refills. X      View recent personal medical records, including lab and immunizations X X X X   View health record, including health history, allergies, medications X X X X   Read reports about your outpatient visits, procedures, consult and ER notes X X X X   See your discharge summary, which is a recap of your hospital and/or ER visit that includes your diagnosis, lab results, and care plan. X X       How to register for [employer]:  1. Go to  [email or web address].  2. Click on the Sign Up Now box, which takes you to the New Member Sign Up page. You will need to provide the following information:  a. Enter your [employer] Access Code exactly as it appears at the top of this page. (You will not need to use this code after you’ve completed the sign-up process. If you do not sign up before the expiration date, you must request a new code.)   b. Enter your date of birth.   c. Enter your home email address.   d. Click Submit, and follow the next screen’s instructions.  3. Create a [employer] ID. This will be your [employer] login ID and cannot be changed, so think of one that is secure and easy to remember.  4. Create a [employer] password. You can change your password at any time.  5. Enter your Password Reset Question and Answer. This can be used at a later time if you forget your password.   6. Enter your e-mail address. This allows you to  receive e-mail notifications when new information is available in [employer].  7. Click Sign Up. You can now view your health information.    For assistance activating your [employer] account, call [phone]

## 2022-04-25 NOTE — PROGRESS NOTE ADULT - ASSESSMENT
66 yr old man with ESRD due to DM on HD since 2019 presenting for DDRT. Pt underwent DDRT on 4/21/22.     Donor 57 yr old, KDPI 82%, DCD WIT 22 min, Terminal cr 1.34. HLA mismatch 1,2,2. CMV +/+    Kidney transplant recipient, s/p DDRT on 4/21/22.   Pt. with DGF with suboptimal urine output. Last HD done on 4/22/22.  Labs reviewed. Serum k with 5.6 and SNa of 125.   Start Lasix 80 mg PO BID.   - Plan for HD today with no UF. HD orders placed and placed in chart.   -Primary Nephrologist Dr. Rey.   -Monitor labs and urine output.       Diabetes type II - On insulin. Adjust as per BS.      HTN - on Coreg 6.25mg bid,  Amlodipine 5mg po daily , telmisartan 80mg daily and Hydralazine 10mg TID at home.  Continue Amlodipine and Coreg. Monitor BP.     Seizure d/o - h/o recent breakthrough seizure - continue Depakote and Keppra     CAD s/p CABG - stable.     Afib on Eliquis at home - hold Eliquis for now,.     Immunosuppression per protocol - Thymo induction. Envarsus, Cellcept, steroids. Infection prophy - Nystatin, bactrim, Valcyte

## 2022-04-26 NOTE — PROGRESS NOTE ADULT - ASSESSMENT
66 yr old M with ESRD and Hx of CVA and CAD s/p CABG, Type 2 DM A1C 7.3 here s/p DDRT on 3/21. Endocrine consulted for steroid exacerbated hyperglycemia.   At home, he is on  He now takes only Novolog 3-5 units (based on correctional scale) before.     # stress hyperglycemia   # type 2 DM, uncontrolled   # CKD  - Patient has been tapered from 250mg followed by 125mg (4/23) to solumedrol 60mg IV on 4/24.    4/25: switched to prednisone 20 mg BID today.    4/26: Prednisone 10 mg po BID   4/27: Prednisone 5 mg BID   4/28: Prednisone 5 mg daily   - While inpatient CHO diet and FS AC and HS  - Goal Glucose 100-180  - Lantus decreased from 14 to 7 U with tapering of steroids.  Pt ate apple sauce around 4 AM explaining the BG of ~ 300   - Prednisone tapered down from 20 mg BID to 10 mg BID today.  Would recommend decreasing Lantus to 4 U HS and c/w Lispro 7 U TID with meals for now.   - c/w low admelog scale before meals and bedtime  - caution with insulin titration in CKD, at risk for hypoglycemia     Discharge plan TBD based on insulin requirements.  Only on NL and not basal insulin due to hypoglycemia.   Patient will f/u with dr Lincoln as outpt.    # Hypertension.   - Mgmt per primary team.    # Hypothyroidism.   -  Continue levothyroxine 50mcg daily     Alejandrina Recinos MD  Pager: 9AM - 5PM (Mon-Fri): [phone]  After 5PM and on Weekends: [phone]     For nonurgent matters, please email [email or web address] for assistance.

## 2022-04-26 NOTE — PROGRESS NOTE ADULT - SUBJECTIVE AND OBJECTIVE BOX
[facility]   MD NINA MASON MD, PA INJELLIOTT SKYE MUNOZ    TEL:  OFFICE: [phone]    From 5pm-7am Answering Service [phone]    -- RENAL FOLLOW UP NOTE ---Date of Service 04-26-22 @ 15:16    Patient is a 66y old  Male who presents with a chief complaint of ESRD (26 Apr 2022 13:25)      Patient seen and examined at bedside. No chest pain/sob    VITALS:  T(F): 97.6 (04-26-22 @ 14:09), Max: 97.8 (04-26-22 @ 09:05)  HR: 61 (04-26-22 @ 14:09)  BP: 166/78 (04-26-22 @ 14:09)  RR: 20 (04-26-22 @ 14:09)  SpO2: 100% (04-26-22 @ 14:09)  Wt(kg): --    04-25 @ 07:01  -  04-26 @ 07:00  --------------------------------------------------------  IN: 920 mL / OUT: 475 mL / NET: 445 mL    04-26 @ 07:01  -  04-26 @ 15:16  --------------------------------------------------------  IN: 0 mL / OUT: 365 mL / NET: -365 mL          PHYSICAL EXAM:  Constitutional: NAD  Neck: No JVD  Respiratory: CTAB, no wheezes, rales or rhonchi  Cardiovascular: S1, S2, RRR  Gastrointestinal: BS+, soft, NT/ND  Extremities: No peripheral edema    Hospital Medications:   MEDICATIONS  (STANDING):  amLODIPine   Tablet 10 milliGRAM(s) Oral daily  apixaban 2.5 milliGRAM(s) Oral two times a day  atorvastatin 40 milliGRAM(s) Oral at bedtime  calcium acetate 667 milliGRAM(s) Oral three times a day with meals  carvedilol 3.125 milliGRAM(s) Oral every 12 hours  dextrose 5%. 1000 milliLiter(s) (50 mL/Hr) IV Continuous <Continuous>  dextrose 5%. 1000 milliLiter(s) (100 mL/Hr) IV Continuous <Continuous>  dextrose 50% Injectable 25 Gram(s) IV Push once  dextrose 50% Injectable 12.5 Gram(s) IV Push once  dextrose 50% Injectable 25 Gram(s) IV Push once  diVALproex  milliGRAM(s) Oral <User Schedule>  diVALproex  milliGRAM(s) Oral <User Schedule>  furosemide    Tablet 80 milliGRAM(s) Oral two times a day  glucagon  Injectable 1 milliGRAM(s) IntraMuscular once  insulin glargine Injectable (LANTUS) 4 Unit(s) SubCutaneous <User Schedule>  insulin lispro (ADMELOG) corrective regimen sliding scale   SubCutaneous three times a day before meals  insulin lispro (ADMELOG) corrective regimen sliding scale   SubCutaneous at bedtime  insulin lispro Injectable (ADMELOG) 7 Unit(s) SubCutaneous three times a day before meals  levETIRAcetam 250 milliGRAM(s) Oral two times a day  levothyroxine 50 MICROGram(s) Oral daily  melatonin 5 milliGRAM(s) Oral at bedtime  mycophenolate mofetil 1 Gram(s) Oral <User Schedule>  nystatin    Suspension 444554 Unit(s) Swish and Swallow four times a day  pantoprazole    Tablet 40 milliGRAM(s) Oral before breakfast  polyethylene glycol 3350 17 Gram(s) Oral every 24 hours  predniSONE   Tablet 10 milliGRAM(s) Oral every 12 hours  senna 2 Tablet(s) Oral at bedtime  sodium bicarbonate 1300 milliGRAM(s) Oral every 12 hours  trimethoprim   80 mG/sulfamethoxazole 400 mG 1 Tablet(s) Oral daily  valGANciclovir 450 milliGRAM(s) Oral <User Schedule>    Tacrolimus (), Serum: 10.3 ng/mL (04-26 @ 07:36)    LABS:  04-26    132<L>  |  94<L>  |  59<H>  ----------------------------<  304<H>  4.7   |  22  |  3.72<H>    Ca    7.1<L>      26 Apr 2022 06:46  Phos  4.1     04-26  Mg     1.9     04-26    TPro  5.4<L>  /  Alb  2.6<L>  /  TBili  0.3  /  DBili      /  AST  16  /  ALT  6<L>  /  AlkPhos  80  04-26    Creatinine Trend: 3.72 <--, 5.48 <--, 5.35 <--, 5.07 <--, 3.72 <--, 6.56 <--, 6.21 <--, 5.62 <--, 5.64 <--, 5.14 <--, 5.08 <--    Albumin, Serum: 2.6 g/dL (04-26 @ 06:46)  Phosphorus Level, Serum: 4.1 mg/dL (04-26 @ 06:46)                              9.1    4.22  )-----------( 105      ( 26 Apr 2022 06:46 )             28.8     Urine Studies:      Iron 55, TIBC 191, %sat 29      [02-04-22 @ 00:04]  Ferritin 1287      [02-04-22 @ 00:04]  PTH -- (Ca 9.2)      [02-05-22 @ 10:13]   294  HbA1c 6.0      [02-21-20 @ 08:53]  Lipid: chol 118, TG 54, HDL 59, LDL --      [06-27-21 @ 09:44]    HBsAb 88427.2      [04-21-22 @ 14:13]  HBsAg Nonreact      [04-21-22 @ 14:13]  HBcAb Reactive      [04-21-22 @ 14:13]  HCV 0.25, Nonreact      [04-21-22 @ 14:13]  HIV Nonreact      [04-21-22 @ 14:47]      RADIOLOGY & ADDITIONAL STUDIES:

## 2022-04-26 NOTE — PROGRESS NOTE ADULT - ASSESSMENT
66 yr old man with ESRD due to DM on HD since 2019 presenting for DDRT. Pt underwent DDRT on 4/21/22.     Donor 57 yr old, KDPI 82%, DCD WIT 22 min, Terminal cr 1.34. HLA mismatch 1,2,2. CMV +/+    Kidney transplant recipient, s/p DDRT on 4/21/22.   Pt. with DGF with suboptimal urine output. Last HD done on 4/25/22.  Continue Lasix 80 mg PO BID.   Labs reviewed. No plan for HD today.   -Primary Nephrologist Dr. Rey.   -Monitor labs and urine output.       Diabetes type II - On insulin. Adjust as per BS.      HTN - on Coreg 6.25mg bid,  Amlodipine 5mg po daily , telmisartan 80mg daily and Hydralazine 10mg TID at home.  Continue Amlodipine and Coreg. Monitor BP.     Seizure d/o - h/o recent breakthrough seizure - continue Depakote and Keppra     CAD s/p CABG - stable.     Afib on Eliquis at home - Restart home dose of Eliquis.     Immunosuppression per protocol - Thymo induction. Envarsus, Cellcept, steroids. Infection prophy - Nystatin, bactrim, Valcyte

## 2022-04-26 NOTE — PROGRESS NOTE ADULT - SUBJECTIVE AND OBJECTIVE BOX
[facility] DIVISION OF KIDNEY DISEASES AND HYPERTENSION -- FOLLOW UP NOTE  --------------------------------------------------------------------------------    CHAD DUNBAR was seen and examined at bedside.     24 hour events:  subjective: Pt reports feeling better. ALINA drain removed. Pt underwent HD yesterday, 4/25/22 and tolerated well. Labs reviewed. Pt. noted to have slightly increased urine output 475 cc in last 24 hours.       Standing Inpatient Medications  amLODIPine   Tablet 10 milliGRAM(s) Oral daily  apixaban 2.5 milliGRAM(s) Oral two times a day  atorvastatin 40 milliGRAM(s) Oral at bedtime  calcium acetate 667 milliGRAM(s) Oral three times a day with meals  carvedilol 3.125 milliGRAM(s) Oral every 12 hours  dextrose 5%. 1000 milliLiter(s) IV Continuous <Continuous>  dextrose 5%. 1000 milliLiter(s) IV Continuous <Continuous>  dextrose 50% Injectable 25 Gram(s) IV Push once  dextrose 50% Injectable 12.5 Gram(s) IV Push once  dextrose 50% Injectable 25 Gram(s) IV Push once  diVALproex  milliGRAM(s) Oral <User Schedule>  diVALproex  milliGRAM(s) Oral <User Schedule>  furosemide    Tablet 80 milliGRAM(s) Oral two times a day  glucagon  Injectable 1 milliGRAM(s) IntraMuscular once  insulin glargine Injectable (LANTUS) 7 Unit(s) SubCutaneous <User Schedule>  insulin lispro (ADMELOG) corrective regimen sliding scale   SubCutaneous three times a day before meals  insulin lispro (ADMELOG) corrective regimen sliding scale   SubCutaneous at bedtime  insulin lispro Injectable (ADMELOG) 7 Unit(s) SubCutaneous three times a day before meals  levETIRAcetam 250 milliGRAM(s) Oral two times a day  levothyroxine 50 MICROGram(s) Oral daily  melatonin 5 milliGRAM(s) Oral at bedtime  mycophenolate mofetil 1 Gram(s) Oral <User Schedule>  nystatin    Suspension [id number] Unit(s) Swish and Swallow four times a day  pantoprazole    Tablet 40 milliGRAM(s) Oral before breakfast  polyethylene glycol 3350 17 Gram(s) Oral every 24 hours  predniSONE   Tablet 10 milliGRAM(s) Oral every 12 hours  senna 2 Tablet(s) Oral at bedtime  sodium bicarbonate 1300 milliGRAM(s) Oral every 12 hours  trimethoprim   80 mG/sulfamethoxazole 400 mG 1 Tablet(s) Oral daily  valGANciclovir 450 milliGRAM(s) Oral <User Schedule>    PRN Inpatient Medications  chlorhexidine 2% Cloths 1 Application(s) Topical daily PRN  dextrose Oral Gel 15 Gram(s) Oral once PRN  ondansetron Injectable 4 milliGRAM(s) IV Push every 6 hours PRN        VITALS/PHYSICAL EXAM  --------------------------------------------------------------------------------  T(C): 36.6 (04-26-22 @ 09:37), Max: 36.6 (04-26-22 @ 09:05)  HR: 64 (04-26-22 @ 09:37) (52 - 70)  BP: 148/71 (04-26-22 @ 09:37) (122/58 - 177/78)  RR: 18 (04-26-22 @ 09:37) (16 - 20)  SpO2: 99% (04-26-22 @ 09:37) (96% - 100%)  Wt(kg): --        04-25-22 @ 07:01  -  04-26-22 @ 07:00  --------------------------------------------------------  IN: 920 mL / OUT: 475 mL / NET: 445 mL    04-26-22 @ 07:01  -  04-26-22 @ 11:34  --------------------------------------------------------  IN: 0 mL / OUT: 325 mL / NET: -325 mL      Physical Exam:  Awake and alert, comfortable  No icterus   Moist oral mucosa, no thrush, + missing teeth   Left IJ tunneled dialysis catheter   Lungs clear b/l  Regular S1 and S2, no murmur  Abdomen soft, non distended, non tender, RLQ transplant site non tender  LLE edema +1 (s/p recent ankle sprain),  no RLE edema   2+ pedal pulses       LABS/STUDIES  --------------------------------------------------------------------------------              9.1    4.22  >-----------<  105      [04-26-22 @ 06:46]              28.8     132  |  94  |  59  ----------------------------<  304      [04-26-22 @ 06:46]  4.7   |  22  |  3.72        Ca     7.1     [04-26-22 @ 06:46]      Mg     1.9     [04-26-22 @ 06:46]      Phos  4.1     [04-26-22 @ 06:46]      Creatinine Trend:  SCr 3.72 [04-26 @ 06:46]  SCr 5.48 [04-25 @ 06:17]  SCr 5.35 [04-24 @ 14:49]  SCr 5.07 [04-24 @ 06:18]  SCr 3.72 [04-23 @ 06:12]    Tacrolimus (), Serum: 10.3 ng/mL (04-26 @ 07:36)  Tacrolimus (), Serum: 20.8 ng/mL (04-25 @ 08:34)  Tacrolimus (), Serum: 12.4 ng/mL (04-24 @ 07:48)  Tacrolimus (), Serum: <2.0 ng/mL (04-23 @ 07:54)    CAPILLARY BLOOD GLUCOSE    POCT Blood Glucose.: 308 mg/dL (26 Apr 2022 08:51)  POCT Blood Glucose.: 181 mg/dL (25 Apr 2022 21:13)  POCT Blood Glucose.: 80 mg/dL (25 Apr 2022 16:50)  POCT Blood Glucose.: 319 mg/dL (25 Apr 2022 12:40)      Iron 55, TIBC 191, %sat 29      [02-04-22 @ 00:04]  Ferritin 1287      [02-04-22 @ 00:04]  PTH -- (Ca 9.2)      [02-05-22 @ 10:13]   294  HbA1c 6.0      [02-21-20 @ 08:53]  Lipid: chol 118, TG 54, HDL 59, LDL --      [06-27-21 @ 09:44]    HBsAb 25200.2      [04-21-22 @ 14:13]  HBsAg Nonreact      [04-21-22 @ 14:13]  HBcAb Reactive      [04-21-22 @ 14:13]  HCV 0.25, Nonreact      [04-21-22 @ 14:13]  HIV Nonreact      [04-21-22 @ 14:47]

## 2022-04-26 NOTE — PROGRESS NOTE ADULT - ASSESSMENT
Assessment: 66 year old male with history of HTN, HLD, DM (on insulin), ESRD (2/2 DM and HTN) on HD via permacath since 3yrs ago MWF, afib (on eliquis 2.5mg BID), hx of stroke (2019 2/2 afib), focal seizure (on depakote, keppra), hx of CABG (2020), hx of nonbleeding gastric/duodenal ulcer (EGD 2/9/22, on protonix) presents for potential DDRT. Makes minimal urine. Last HD: 4/20 (nephrologist, Dr. Barrientos). He is now s/p DCD DDRT with thymo induction and STENT placement.     [] s/p DDRT w/ Thymo Induction- POD5  -DGF: HD 4/25   -Improvement in UOP overnight  -Voiding freely, strict I&Os  -CC renal diet  -SCDs  -PRN Pain regimen with Tylenol, Tramadol   -Bowel regimen  -Daily CBC, CMP, Mg/Phos, Coags, Tacro level  -restart eliquis 2.5mg BID    [] Immunosuppression  -Thymoglobulin induction (total 4.5mg/kg thus far)   -Envarsus 3 (held due to high level. f/u level this AM), MMF 1g BID, Steroid taper   -Thymoglobulin 100mg + Solumedrol 60mg today   -Daily FK level   -PPX: Valcyte, Nystatin, Bactrim, Pepcid     [] HTN/AFib:  -Continue half home dose Coreg 3.125 BID  -Continue home dose Atorvastatin, Norvasc 10   -Holding home Hydralazine, Telmisartan   -restart eliquis today    [] IDDM  -POCT glucose q6h  -Continue mISS  -Was on mISS at home     [] Hx CVA (residual bilateral weakness) c/b Seizure Disorder  -Continue home Depakote and Keppra   -PT/OT consult to assist in ambulation     [] Hypothyroidism  -Continue home Synthroid

## 2022-04-26 NOTE — PHARMACY EDUCATION NOTE - MEDICATION SAFETY
Adherence/Allergies/Herbals/Interactions/Medication precautions/Miss dose instruction/Purpose/Side effects/Signs and symptoms to report/Storage and handling

## 2022-04-26 NOTE — PROGRESS NOTE ADULT - SUBJECTIVE AND OBJECTIVE BOX
Subjective: seen and examined with son at bedside   no hypoglycemic episodes in last 24 hours   tolerating diet   he admits to eating apple sauce around 4 AM explaining BG of ~ 300 in the morning     MEDICATIONS  (STANDING):  amLODIPine   Tablet 10 milliGRAM(s) Oral daily  apixaban 2.5 milliGRAM(s) Oral two times a day  atorvastatin 40 milliGRAM(s) Oral at bedtime  calcium acetate 667 milliGRAM(s) Oral three times a day with meals  carvedilol 3.125 milliGRAM(s) Oral every 12 hours  dextrose 5%. 1000 milliLiter(s) (50 mL/Hr) IV Continuous <Continuous>  dextrose 5%. 1000 milliLiter(s) (100 mL/Hr) IV Continuous <Continuous>  dextrose 50% Injectable 25 Gram(s) IV Push once  dextrose 50% Injectable 12.5 Gram(s) IV Push once  dextrose 50% Injectable 25 Gram(s) IV Push once  diVALproex  milliGRAM(s) Oral <User Schedule>  diVALproex  milliGRAM(s) Oral <User Schedule>  furosemide    Tablet 80 milliGRAM(s) Oral two times a day  glucagon  Injectable 1 milliGRAM(s) IntraMuscular once  insulin glargine Injectable (LANTUS) 7 Unit(s) SubCutaneous <User Schedule>  insulin lispro (ADMELOG) corrective regimen sliding scale   SubCutaneous three times a day before meals  insulin lispro (ADMELOG) corrective regimen sliding scale   SubCutaneous at bedtime  insulin lispro Injectable (ADMELOG) 7 Unit(s) SubCutaneous three times a day before meals  levETIRAcetam 250 milliGRAM(s) Oral two times a day  levothyroxine 50 MICROGram(s) Oral daily  melatonin 5 milliGRAM(s) Oral at bedtime  mycophenolate mofetil 1 Gram(s) Oral <User Schedule>  nystatin    Suspension [id number] Unit(s) Swish and Swallow four times a day  pantoprazole    Tablet 40 milliGRAM(s) Oral before breakfast  polyethylene glycol 3350 17 Gram(s) Oral every 24 hours  predniSONE   Tablet 10 milliGRAM(s) Oral every 12 hours  senna 2 Tablet(s) Oral at bedtime  sodium bicarbonate 1300 milliGRAM(s) Oral every 12 hours  trimethoprim   80 mG/sulfamethoxazole 400 mG 1 Tablet(s) Oral daily  valGANciclovir 450 milliGRAM(s) Oral <User Schedule>    MEDICATIONS  (PRN):  chlorhexidine 2% Cloths 1 Application(s) Topical daily PRN cloth  dextrose Oral Gel 15 Gram(s) Oral once PRN Blood Glucose LESS THAN 70 milliGRAM(s)/deciliter  ondansetron Injectable 4 milliGRAM(s) IV Push every 6 hours PRN Nausea and/or Vomiting      PHYSICAL EXAM:  VITALS: T(C): 36.6 (04-26-22 @ 09:37)  T(F): 97.8 (04-26-22 @ 09:37), Max: 97.8 (04-26-22 @ 09:05)  HR: 64 (04-26-22 @ 09:37) (59 - 70)  BP: 148/71 (04-26-22 @ 09:37) (145/65 - 177/78)  RR:  (16 - 20)  SpO2:  (96% - 100%)  Wt(kg): --  GENERAL: NAD, well-groomed, well-developed  EYES: No proptosis, no injection  HEENT:  Atraumatic, Normocephalic, moist mucous membranes  THYROID: Normal size, no palpable nodules  RESPIRATORY: Clear to auscultation bilaterally; No rales, rhonchi, wheezing, or rubs  CARDIOVASCULAR: Regular rate and rhythm; No murmurs; no peripheral edema  GI: Soft, nontender, non distended, normal bowel sounds  CUSHING'S SIGNS: no striae    POCT Blood Glucose.: 298 mg/dL (04-26-22 @ 12:58)  POCT Blood Glucose.: 308 mg/dL (04-26-22 @ 08:51)  POCT Blood Glucose.: 181 mg/dL (04-25-22 @ 21:13)  POCT Blood Glucose.: 80 mg/dL (04-25-22 @ 16:50)  POCT Blood Glucose.: 319 mg/dL (04-25-22 @ 12:40)  POCT Blood Glucose.: 327 mg/dL (04-25-22 @ 08:27)  POCT Blood Glucose.: 202 mg/dL (04-24-22 @ 21:09)  POCT Blood Glucose.: 233 mg/dL (04-24-22 @ 17:04)  POCT Blood Glucose.: 270 mg/dL (04-24-22 @ 14:08)  POCT Blood Glucose.: 291 mg/dL (04-24-22 @ 13:06)  POCT Blood Glucose.: 276 mg/dL (04-24-22 @ 08:59)  POCT Blood Glucose.: 236 mg/dL (04-24-22 @ 02:26)  POCT Blood Glucose.: 159 mg/dL (04-23-22 @ 21:11)  POCT Blood Glucose.: 179 mg/dL (04-23-22 @ 17:04)    04-26    132<L>  |  94<L>  |  59<H>  ----------------------------<  304<H>  4.7   |  22  |  3.72<H>    EGFR if : x   EGFR if non : x     Ca    7.1<L>      04-26  Mg     1.9     04-26  Phos  4.1     04-26    TPro  5.4<L>  /  Alb  2.6<L>  /  TBili  0.3  /  DBili  x   /  AST  16  /  ALT  6<L>  /  AlkPhos  80  04-26      Thyroid Function Tests:

## 2022-04-26 NOTE — PROGRESS NOTE ADULT - SUBJECTIVE AND OBJECTIVE BOX
Transplant Surgery - Multidisciplinary Rounds  --------------------------------------------------------------  DDRT Date: 22         POD#5    Present: Patient seen and examined with multidisciplinary team including Transplant Surgeon: Dr. Davis. Nephrologist: Dr. Alfred, pharmacist VENICE Lim, PHIL Styles during am rounds.  Disciplines not in attendance will be notified of the plan.     HPI: 66 year old male with history of HTN, HLD, DM (on insulin), ESRD (2/2 DM and HTN) on HD via permacath since 3yrs ago MWF, afib (on eliquis 2.5mg BID), hx of stroke ( afib), focal seizure (on depakote, keppra), hx of CABG (), hx of nonbleeding gastric/duodenal ulcer (EGD 22, on protonix) presents for potential DDRT. Makes minimal urine. Last HD:  (nephrologist, Dr. Barrientos)     -Now s/p DCD DDRT with thymo induction and STENT placement w/ 22.     OR details: DCD DDRT with Thymo induction with stent placement  1 artery, 1 vein, 1 ureter  Cold ischemia time 22 hr      Recipient:  Srinivasan Castelan  /Age:    1955  67 y/o M  Hx:  DM, HTN, CAD s/p Stent (2019), CVA             Covid Vaccine:   Moderna x 3  Nephrologist: Dr. Sneed  CPRA:    0% Luminex dated 3/2/2022  ABO:      B  CMV:  Positive    EBV Status:  Positive  Last HD:  2022  NPO:  Yes  ALLERGIES: NKDA    Covid Testing: Negative done on admission 22  Pt Location-[address] [place] [address] -Treatment room  Financially cleared- Yes  Recipient OR- 22  1500 pm  Surgeon-Dr. Barber       Donor [facility]import): [facility]    Donor ID: [id number]   Match: [id number]  OPO: [facility]  Age:  57 y.o M  ABO:  B  High Risk: Yes (MSM)    KDPI: 82%  DCD: Yes   22min WIT  COD:  CVA ICH  X Clamp Time: 2022 20:20  Medical Hx:  ETOH abuse, polysubstance abuse  Terminal Cr:  1.34  Covid Testing: Negative NP 2022  CMV-Positive      EBV-Positive  HepBcAb-Negative  Hepatitis C-OPAL- Negative  Hepatitis C ab-Negative     MISMATCH: 1,2,2  Kidney ETA to [facility]:  22 1500  Kidney Laterality: Right  X-match –virtual negative    Interval Events:  -POD#5 DCD DDRT with stent  -Postop US: slight increase in flow at iliac a. anastomosis  -HD yesterday, no fluid removal  -UOP increasing, continue monitoring  -lasix 80mg PO BID  -ALINA removed yesterday  -tacrolimus held for increased level (20.8) yesterday               Immunosuppression:         Induction: Thymoglobulin completed yesterday  Immunosuppression: Env by level, MMF 1g BID, pred taper   Ongoing monitoring for signs of rejection     Potential Discharge date: possible discharge w/ HD    Education:  Medications  Plan of care:  See Below      MEDICATIONS  (STANDING):  amLODIPine   Tablet 10 milliGRAM(s) Oral daily  atorvastatin 40 milliGRAM(s) Oral at bedtime  calcium acetate 667 milliGRAM(s) Oral three times a day with meals  carvedilol 3.125 milliGRAM(s) Oral every 12 hours  dextrose 5%. 1000 milliLiter(s) (100 mL/Hr) IV Continuous <Continuous>  dextrose 5%. 1000 milliLiter(s) (50 mL/Hr) IV Continuous <Continuous>  dextrose 50% Injectable 25 Gram(s) IV Push once  dextrose 50% Injectable 12.5 Gram(s) IV Push once  dextrose 50% Injectable 25 Gram(s) IV Push once  diVALproex  milliGRAM(s) Oral <User Schedule>  diVALproex  milliGRAM(s) Oral <User Schedule>  furosemide    Tablet 80 milliGRAM(s) Oral two times a day  glucagon  Injectable 1 milliGRAM(s) IntraMuscular once  insulin glargine Injectable (LANTUS) 7 Unit(s) SubCutaneous <User Schedule>  insulin lispro (ADMELOG) corrective regimen sliding scale   SubCutaneous three times a day before meals  insulin lispro (ADMELOG) corrective regimen sliding scale   SubCutaneous at bedtime  insulin lispro Injectable (ADMELOG) 7 Unit(s) SubCutaneous three times a day before meals  levETIRAcetam 250 milliGRAM(s) Oral two times a day  levothyroxine 50 MICROGram(s) Oral daily  melatonin 5 milliGRAM(s) Oral at bedtime  mycophenolate mofetil 1 Gram(s) Oral <User Schedule>  nystatin    Suspension 013725 Unit(s) Swish and Swallow four times a day  pantoprazole    Tablet 40 milliGRAM(s) Oral before breakfast  polyethylene glycol 3350 17 Gram(s) Oral every 24 hours  predniSONE   Tablet 10 milliGRAM(s) Oral every 12 hours  senna 2 Tablet(s) Oral at bedtime  sodium bicarbonate 1300 milliGRAM(s) Oral every 12 hours  trimethoprim   80 mG/sulfamethoxazole 400 mG 1 Tablet(s) Oral daily  valGANciclovir 450 milliGRAM(s) Oral <User Schedule>    MEDICATIONS  (PRN):  chlorhexidine 2% Cloths 1 Application(s) Topical daily PRN cloth  dextrose Oral Gel 15 Gram(s) Oral once PRN Blood Glucose LESS THAN 70 milliGRAM(s)/deciliter  ondansetron Injectable 4 milliGRAM(s) IV Push every 6 hours PRN Nausea and/or Vomiting      PAST MEDICAL & SURGICAL HISTORY:  Hypertension    Diabetes    Dyslipidemia    CAD (Coronary Artery Disease)  with Stents in 2009 and 2019, s/p off-pump C3L on 20    Hypothyroidism    CVA (cerebral vascular accident)  19 with residual bilateral weakness    Anemia    PEG (percutaneous endoscopic gastrostomy) status  removed 2020    Intubation of airway performed without difficulty  Dec 2019  CVA c/b status epilepticus requiring intubation and PEG in 2019-    ESRD on dialysis  M/W/F    Seizures  after CVA, last seizure was last week 22    History of insertion of stent into coronary artery bypass graft   and     S/P CABG x 3  off pump C3L on 20        Vital Signs Last 24 Hrs  T(C): 36.6 (2022 09:37), Max: 36.6 (2022 09:05)  T(F): 97.8 (2022 09:37), Max: 97.8 (2022 09:05)  HR: 64 (2022 09:37) (52 - 70)  BP: 148/71 (2022 09:37) (122/58 - 177/78)  BP(mean): --  RR: 18 (2022 09:37) (16 - 20)  SpO2: 99% (2022 09:37) (96% - 100%)    I&O's Summary    2022 07:01  -  2022 07:00  --------------------------------------------------------  IN: 920 mL / OUT: 475 mL / NET: 445 mL    2022 07:01  -  2022 09:54  --------------------------------------------------------  IN: 0 mL / OUT: 115 mL / NET: -115 mL                              9.1    4.22  )-----------( 105      ( 2022 06:46 )             28.8         132<L>  |  94<L>  |  59<H>  ----------------------------<  304<H>  4.7   |  22  |  3.72<H>    Ca    7.1<L>      2022 06:46  Phos  4.1       Mg     1.9         TPro  5.4<L>  /  Alb  2.6<L>  /  TBili  0.3  /  DBili  x   /  AST  16  /  ALT  6<L>  /  AlkPhos  80      Tacrolimus (), Serum: 20.8 ng/mL ( @ 08:34)      Review of systems  Gen: No weight changes, fatigue, fevers/chills, weakness  Skin: No rashes  Head/Eyes/Ears/Mouth: No headache; Normal hearing; Normal vision w/o blurriness; No sinus pain/discomfort, sore throat  Respiratory: No dyspnea, cough, wheezing, hemoptysis  CV: No chest pain, PND, orthopnea  GI: C/O mild abdominal pain at surgical site. no diarrhea, constipation, nausea, vomiting, melena, hematochezia  : No increased frequency, dysuria, hematuria, nocturia  MSK: No joint pain/swelling; no back pain; no edema  Neuro: No dizziness/lightheadedness, weakness, seizures, numbness, tingling  Heme: No easy bruising or bleeding  Endo: No heat/cold intolerance  Psych: No significant nervousness, anxiety, stress, depression  All other systems were reviewed and are negative, except as noted.      PHYSICAL EXAM:  Constitutional: Well developed / well nourished  Eyes: PERRLA  ENMT: nc/at, no thrush  Neck: supple, central line  Respiratory: CTA B/L  Cardiovascular: RRR  Gastrointestinal: Soft abdomen, ND, appropriate incisional TTP, c/d/i  Genitourinary: Voiding freely  Extremities: SCD's in place and working bilaterally  Vascular: Palpable DP pulses bilaterally.   Neurological: A&O x3  Skin: no rashes, ulcerations, lesions  Musculoskeletal: Moving all extremities  Psychiatric: Responsive  Psychiatric: Responsive

## 2022-04-26 NOTE — CHART NOTE - NSCHARTNOTEFT_GEN_A_CORE
Discussed with primary team to start Admelog 2 Units TIDAC plus low scale before meals and bedtime. Full consult to be done tomorrow.    Mendy Sierra DO
Nutrition Follow Up Note  Pt seen for post kidney transplant recipient nutrition follow up per department protocol.     Pt 67 y/o M with PMH as per chart: HTN, HLD, DM (on insulin), ESRD (2/2 DM and HTN) on HD, Afib, hx of stroke ( Afib), focal seizure, hx of CABG (), hx of nonbleeding gastric/duodenal ulcer (EGD 22, on Protonix), admitted for kidney transplant, S/P DDRT (), with DGF with suboptimal urine output, last HD done on ().     Source: [x] Patient       [x] EMR        [] RN        [x] Family at bedside, son       [] Other:    -If unable to interview patient: [] Trach/Vent/BiPAP  [] Disoriented/confused/inappropriate to interview as per chart     Pt reports good appetite and PO intake. Son requests Nepro. Denies difficulty chewing/swallowing easy to chew diet. Pt denies nausea, vomiting, diarrhea, or constipation, reports last BM yesterday (). Urine output as per flow sheets () 760 ml -> () 475 ml -> () 365 ml so far.     Pt received education on T2DM and post transplant nutrition therapy and food safety guidelines for transplant recipients with nutrition package in previous RD visit - reviewed per son's request. Made aware RD remains available.     Diet Order:   Diet, Consistent Carbohydrate Renal w/Evening Snack:   Easy to Chew (EASYTOCHEW) (-)    Weights:   Daily Weight in k.5 (), Weight in k.7 (), Weight in k.7 (), Weight in k.3 (), Weight in k.8 (), Weight in k.9 (), Weight in k.4 () -?accuracy of weight fluctuations as pt with edema and S/P DDRT.   Pt reported UBW ~62 kg (136.4 pounds) in previous RD visit.     Nutritionally Pertinent MEDICATIONS  (STANDING):  amLODIPine   Tablet  atorvastatin  calcium acetate  carvedilol  dextrose 5%.  dextrose 5%.  dextrose 50% Injectable  dextrose 50% Injectable  dextrose 50% Injectable  furosemide    Tablet  glucagon  Injectable  insulin glargine Injectable (LANTUS)  insulin lispro (ADMELOG) corrective regimen sliding scale  insulin lispro (ADMELOG) corrective regimen sliding scale  insulin lispro Injectable (ADMELOG)  levothyroxine  nystatin    Suspension  pantoprazole    Tablet  polyethylene glycol 3350  predniSONE   Tablet  senna  sodium bicarbonate  trimethoprim   80 mG/sulfamethoxazole 400 mG  valGANciclovir    Pertinent Labs: ( @ 06:46): Na 132<L>, BUN 59<H>, Cr 3.72<H>, <H>, K+ 4.7, Phos 4.1, Mg 1.9, Alk Phos 80, ALT/SGPT 6<L>, AST/SGOT 16    A1C with Estimated Average Glucose Result: 6.6 % (22 @ 17:12)  A1C with Estimated Average Glucose Result: 7.3 % (22 @ 13:42)  A1C with Estimated Average Glucose Result: 7.2 % (22 @ 05:48)    Finger Sticks:  POCT Blood Glucose.: 298 mg/dL ( @ 12:58)  POCT Blood Glucose.: 308 mg/dL ( @ 08:51)  POCT Blood Glucose.: 181 mg/dL ( @ 21:13)  POCT Blood Glucose.: 80 mg/dL ( @ 16:50)    Skin per nursing documentation: no pressure injuries.   Edema as per flow sheets: +1 generalized and +2 dave. ankle, arm, and foot.     Estimated Needs:   [x] no change since previous assessment  [] recalculated:     Previous Nutrition Diagnoses:  [x] Increased Nutrient Needs  [x] Food & Nutrition Related Knowledge Deficit     Nutrition Diagnoses are: [x] ongoing - addressed with PO intake encouragement and nutrition therapy education.    New Nutrition Diagnosis: [x] Not applicable    Nutrition Care Plan:  [x] In Progress     Nutrition Interventions:     Education Provided:       [x] Yes  [] No    Recommendations:      1. Continue easy to chew + Consistent Carbohydrate with snack + renal diet upon discharge. Defer diet/fluid consistencies to medical team/pt's preference. Recommend follow up visit with Transplant MD and outpatient RD for dietary modifications as warranted.  2. Recommend Nepro 1x daily (425 kcals, 19 g protein) per son's request. Spoke to Transplant Team.   3. Encourage PO intake and honor food preferences.   4. Reviewed education on T2DM and post transplant nutrition therapy and food safety guidelines for transplant recipients before discharge.   5. Continue to obtain weights as able to identify changes if any.     Monitoring and Evaluation:   Continue to monitor nutritional intake, tolerance to diet prescription, weights, labs, skin integrity, urine output    RD remains available upon request and will follow up per protocol  Carina Fuller MS RDN CDN CDCES #[phone]
66 yr old M with ESRD and Hx of CVA and CAD s/p CABG, Type 2 DM A1C 7.3 here s/p DDRT on 3/21. Endocrine consulted for steroid exacerbated hyperglycemia.   At home, he is on  He now takes only Novolog 3-5 units (based on correctional scale) before.     Patient has been tapered from 250mg followed by 125mg (4/23) to solumedrol 60mg IV on 4/24.    He has been switched to prednisone 20 mg BID today.    4/26: Prednisone 10 mg po BID   4/27: Prednisone 5 mg BID   4/28: Prednisone 5 mg daily     BG remains uncontrolled but would improved significantly with change in prednisone.  Last dose of solumedrol around 17:00 hour on 4/24.  Would c/w lispro 7 U TID with meals.    But, would recommend decreasing Lantus to 7 U tonight.      Alejandrina Recinos MD  Pager: 9AM - 5PM (Mon-Fri): [phone]  After 5PM and on Weekends: [phone]     For nonurgent matters, please email [email or web address] for assistance.
CHAD DUNBAR was provided with the Stent Information Sheet.     The patient was educated that they have a ureteral stent to avoid major complications like blockage or leaking following kidney transplantation. The patient was educated that the ureteral stent is left in typically for 4 – 6 weeks and removed by the surgeon on an outpatient basis. The procedure was briefly explained to the patient and will be explained in further detail during the outpatient visit.     Patient was educated on appointment for stent removal and diet restrictions before scheduled procedure. The patient was also instructed to have someone with them to accompany them home as they will not be allowed to drive.     Contact information was provided to the patient for additional questions or if the stent is not removed by 6 weeks.     The patient was provided with written and verbal education about their ureteral stent. All questions and concerns were addressed appropriately with the patient. The patient demonstrated understanding of the information.     Time spent with patient **10** minutes. Discussed with patient and son at bedside. Copy provided to patient.

## 2022-04-26 NOTE — PROGRESS NOTE ADULT - ASSESSMENT
This is a 66 year old male with history of HTN, HLD, DM (on insulin), ESRD (2/2 DM and HTN) on HD via permacath since 3yrs ago MWF, afib (on eliquis 2.5mg BID), hx of stroke (2019 2/2 afib), focal seizure (on depakote, keppra), hx of CABG (2020), hx of nonbleeding gastric/duodenal ulcer (EGD 2/9/22, on protonix) presents for DDRT. Nephrology consulted for renal failure.     A/P   DDRT on 4/21/22.   Last HD on 4/22/22  transplant team on board   Immunosuppressant as per transplant team     hyperkalemia, hyponatremia ; s/p HD 04/25/22.  Continue Lasix 80 mg PO BID.   Labs reviewed. No plan for HD today.   -Monitor labs and urine output.   HD consent in the cart   Monitor renal function and urine output closely.     HTN   Bp fluctuating   monitor BP closely     Anemia:  Monitor Hb  blood transfusion as needed

## 2022-04-26 NOTE — PHARMACY EDUCATION NOTE - EDUCATION SUMMARY
Discharge immunosuppressant medications and prophylatic anti-infective agents reviewed with the patient. Outpatient medication schedule was discussed in detail including: medication name, indication, dose, administration times, treatment duration, side effects, drug interactions, and special instructions. Patient and family member's questions and concerns were answered and addressed. Patient demonstrated understanding.

## 2022-04-27 NOTE — DISCHARGE NOTE NURSING/CASE MANAGEMENT/SOCIAL WORK - NSDCPEFALRISK_GEN_ALL_CORE
For information on Fall & Injury Prevention, visit: [email or web address] OR  [email or web address] OR  https://www.cdc.gov/steadi/patient.html

## 2022-04-27 NOTE — PROGRESS NOTE ADULT - TIME BILLING
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  Patient seen, examined and reviewed available clinical and lab data including history,  progress notes and consult notes.  Reviewed immunosuppression and allograft function including urine out put, creatinine trend, urine studies and   allograft  imaging.  Reviewed medication regimen for glycemic control and blood pressure control  Suggestions:  Recheck blood chemistry this afternoon  Will decide hemodialysis based on urine output and blood chemistry  Tacrolimus target level 8-10 ng/ml  Thymoglobulin as per protocol  Agree with diuretic- Furosemide 80 mg IV  Monitor for allograft function recovery  D/w transplant team  Will follow  I was present during and reviewed clinical and lab data as well as assessment and plan as documented . Please contact if any additional questions with any change in clinical condition or on availability of any additional information or reports.
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 Thymo induction  Patient seen, examined and reviewed available clinical and lab data including history,  progress notes and consult notes.  Reviewed immunosuppression and allograft function including urine out put, creatinine trend, urine studies and  allograft   imaging.  Reviewed medication regimen for  blood pressure control , infection prophylaxis and glycemic control  Suggestions:  Tacrolimus target level 8-10 ng/ml  Will monitor for allograft function recovery  D/w transplant team  I was present during and reviewed clinical and lab data as well as assessment and plan as documented. Please contact if any additional questions with any change in clinical condition or on availability of any additional information or reports.
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  Patient seen, examined and reviewed available clinical and lab data including history,  progress notes and consult notes.  Reviewed immunosuppression and allograft function including urine out put, creatinine trend, urine studies and   allograft  imaging.  Reviewed medication regimen for glycemic control and blood pressure control  Suggestions:  Hemodialysis today.  I have seen the patient and reviewed dialysis prescription  . Dialysis access is functioning well.   Dialysis prescription has been adjusted for optimized control of volume status, uremia and electrolytes. Management of additional metabolic abnormalities/anemia will continue to be addressed on follow up.  IGOR for anemia  Tacrolimus target level 8-10 ng/ml  Thymoglobulin induction completed  Agree with diuretic regimen  Monitor for allograft function recovery  D/w transplant team  Will follow  I was present during and reviewed clinical and lab data as well as assessment and plan as documented . Please contact if any additional questions with any change in clinical condition or on availability of any additional information or reports.
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  Patient seen, examined and reviewed available clinical and lab data including history,  progress notes and consult notes.  Reviewed immunosuppression and allograft function including urine out put, creatinine trend, urine studies and   allograft  imaging.  Reviewed medication regimen for glycemic control and blood pressure control  Suggestions:  Tacrolimus target level 8-10 ng/ml  Thymoglobulin as per protocol  Agree with diuretic- Furosemide 80 mg IV  Incentive spirometry, ambulation  Monitor for allograft function recovery  D/w transplant team  Will follow  I was present during and reviewed clinical and lab data as well as assessment and plan as documented . Please contact if any additional questions with any change in clinical condition or on availability of any additional information or reports.
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 Seizure management regimen noted, h/o CVA ; Thymo induction  Patient seen, examined and reviewed available clinical and lab data including history,  progress notes and consult notes.  Reviewed immunosuppression and allograft function including urine out put, creatinine trend, urine studies and  allograft   imaging.  Reviewed medication regimen for  blood pressure control , infection prophylaxis and glycemic control  Suggestions:  Tacrolimus target level 8-10 ng/ml  Hemodialysis  I have seen the patient and reviewed dialysis prescription  . Dialysis access is functioning well.  Dialysis prescription has been adjusted for optimized control of volume status, uremia and electrolytes. Management of additional metabolic abnormalities/anemia will continue to be addressed on follow up.  Will monitor for allograft function recovery  D/w transplant team  I was present during and reviewed clinical and lab data as well as assessment and plan as documented. Please contact if any additional questions with any change in clinical condition or on availability of any additional information or reports.

## 2022-04-27 NOTE — PROGRESS NOTE ADULT - REASON FOR ADMISSION
DDRT
ESRD

## 2022-04-27 NOTE — PROGRESS NOTE ADULT - NS ATTEND AMEND GEN_ALL_CORE FT
Delayed graft function  dialysis as needed' may need dialysis today  immunosuppression tacro, mmf and steroids; hold tacro for high level
Hd yesterday for low UOP/hyperkalemia  no complaints of pain    Plan  iv lasix 80mg today  mobilize  diet as tolerated  thymo  env 5mg today
POD1 DDRT  uop 10-20ml/h  no pain  BP stable  500ml hs output from drain (clearing up)    P  HD today  hold eliquis  mobilize
S/P kidney transplant: No HD today, monitor urine output
remains in DGF  UOP slowly improving  immunosuppression tacro, mmf and steroids
Delayed graft function  immunosuppression tacro, mmf and steroids  will discharge home on dialysis and follow outpatient

## 2022-04-27 NOTE — DISCHARGE NOTE NURSING/CASE MANAGEMENT/SOCIAL WORK - PATIENT PORTAL LINK FT
You can access the FollowMyHealth Patient Portal offered by [facility] by registering at the following website: [email or web address]. By joining [employer]’s FollowMyHealth portal, you will also be able to view your health information using other applications (apps) compatible with our system.

## 2022-04-27 NOTE — DISCHARGE NOTE NURSING/CASE MANAGEMENT/SOCIAL WORK - NSDCVIVACCINE_GEN_ALL_CORE_FT
Tdap; 24-Jun-2021 20:00; Marcela Horn (RN); [employer]; [id number] (Exp. Date: 04-Sep-2022); IntraMuscular; Deltoid Left.; 0.5 milliLiter(s); VIS (VIS Published: 09-May-2013, VIS Presented: 24-Jun-2021);   Tdap; 09-Mar-2022 07:35; Wilson Agrawal (RN); [employer]; [id number] (Exp. Date: 28-Sep-2023); IntraMuscular; Deltoid Left.; 0.5 milliLiter(s); VIS (VIS Published: 09-May-2013, VIS Presented: 09-Mar-2022);

## 2022-04-27 NOTE — PROVIDER CONTACT NOTE (HYPOGLYCEMIA EVENT) - NS PROVIDER CONTACT BACKGROUND-HYPO
Age: 66y    Gender: Male    POCT Blood Glucose:  106 mg/dL (04-27-22 @ 18:29)  76 mg/dL (04-27-22 @ 18:13)  69 mg/dL (04-27-22 @ 17:59)  66 mg/dL (04-27-22 @ 17:43)  67 mg/dL (04-27-22 @ 17:41)  424 mg/dL (04-27-22 @ 12:58)  466 mg/dL (04-27-22 @ 12:57)  119 mg/dL (04-27-22 @ 08:44)      eMAR:atorvastatin   40 milliGRAM(s) Oral (04-26-22 @ 20:17)    insulin glargine Injectable (LANTUS)   6 Unit(s) SubCutaneous (04-26-22 @ 22:24)    insulin lispro (ADMELOG) corrective regimen sliding scale   6 Unit(s) SubCutaneous (04-27-22 @ 13:11)    insulin lispro Injectable (ADMELOG)   9 Unit(s) SubCutaneous (04-27-22 @ 13:12)   9 Unit(s) SubCutaneous (04-27-22 @ 08:55)    levothyroxine   50 MICROGram(s) Oral (04-27-22 @ 05:57)    predniSONE   Tablet   10 milliGRAM(s) Oral (04-26-22 @ 20:16)    predniSONE   Tablet   5 milliGRAM(s) Oral (04-27-22 @ 08:02)

## 2022-04-27 NOTE — PROGRESS NOTE ADULT - SUBJECTIVE AND OBJECTIVE BOX
[facility] DIVISION OF KIDNEY DISEASES AND HYPERTENSION -- FOLLOW UP NOTE  --------------------------------------------------------------------------------      CHADROSAURA DUNBAR was seen and examined at bedside.     24 hour events:  subjective: pt reports feeling okay. no acute events overnight. Scr remains elevated at 4.2 with UOP of 1200 cc in last 24 hours.       Standing Inpatient Medications  amLODIPine   Tablet 10 milliGRAM(s) Oral daily  apixaban 2.5 milliGRAM(s) Oral two times a day  atorvastatin 40 milliGRAM(s) Oral at bedtime  calcium acetate 667 milliGRAM(s) Oral three times a day with meals  carvedilol 3.125 milliGRAM(s) Oral every 12 hours  dextrose 5%. 1000 milliLiter(s) IV Continuous <Continuous>  dextrose 5%. 1000 milliLiter(s) IV Continuous <Continuous>  dextrose 50% Injectable 25 Gram(s) IV Push once  dextrose 50% Injectable 12.5 Gram(s) IV Push once  dextrose 50% Injectable 25 Gram(s) IV Push once  diVALproex  milliGRAM(s) Oral <User Schedule>  diVALproex  milliGRAM(s) Oral <User Schedule>  furosemide    Tablet 80 milliGRAM(s) Oral two times a day  glucagon  Injectable 1 milliGRAM(s) IntraMuscular once  insulin glargine Injectable (LANTUS) 6 Unit(s) SubCutaneous at bedtime  insulin lispro (ADMELOG) corrective regimen sliding scale   SubCutaneous three times a day before meals  insulin lispro (ADMELOG) corrective regimen sliding scale   SubCutaneous at bedtime  insulin lispro Injectable (ADMELOG) 9 Unit(s) SubCutaneous three times a day before meals  levETIRAcetam 250 milliGRAM(s) Oral two times a day  levETIRAcetam 250 milliGRAM(s) Oral once  levothyroxine 50 MICROGram(s) Oral daily  melatonin 5 milliGRAM(s) Oral at bedtime  mycophenolate mofetil 1 Gram(s) Oral <User Schedule>  nystatin    Suspension 894319 Unit(s) Swish and Swallow four times a day  pantoprazole    Tablet 40 milliGRAM(s) Oral before breakfast  polyethylene glycol 3350 17 Gram(s) Oral every 24 hours  predniSONE   Tablet 5 milliGRAM(s) Oral every 12 hours  senna 2 Tablet(s) Oral at bedtime  sodium bicarbonate 1300 milliGRAM(s) Oral every 12 hours  tacrolimus ER Tablet (ENVARSUS XR) 2 milliGRAM(s) Oral <User Schedule>  trimethoprim   80 mG/sulfamethoxazole 400 mG 1 Tablet(s) Oral daily  valGANciclovir 450 milliGRAM(s) Oral <User Schedule>    PRN Inpatient Medications  chlorhexidine 2% Cloths 1 Application(s) Topical daily PRN  dextrose Oral Gel 15 Gram(s) Oral once PRN  ondansetron Injectable 4 milliGRAM(s) IV Push every 6 hours PRN        VITALS/PHYSICAL EXAM  --------------------------------------------------------------------------------  T(C): 36.6 (04-27-22 @ 12:30), Max: 36.6 (04-27-22 @ 12:30)  HR: 61 (04-27-22 @ 12:30) (58 - 65)  BP: 149/69 (04-27-22 @ 12:30) (137/72 - 158/79)  RR: 20 (04-27-22 @ 12:30) (18 - 20)  SpO2: 100% (04-27-22 @ 12:30) (98% - 100%)  Wt(kg): --        04-26-22 @ 07:01  -  04-27-22 @ 07:00  --------------------------------------------------------  IN: 0 mL / OUT: 1205 mL / NET: -1205 mL    04-27-22 @ 07:01  -  04-27-22 @ 14:25  --------------------------------------------------------  IN: 530 mL / OUT: 575 mL / NET: -45 mL      Physical Exam:  Awake and alert, comfortable  No icterus   Moist oral mucosa, no thrush, + missing teeth   Left IJ tunneled dialysis catheter   Lungs clear b/l  Regular S1 and S2, no murmur  Abdomen soft, non distended, non tender, RLQ transplant site non tender  trace LLE edema,  no RLE edema   2+ pedal pulses     LABS/STUDIES  --------------------------------------------------------------------------------              9.6    4.83  >-----------<  117      [04-27-22 @ 06:39]              30.6     130  |  93  |  72  ----------------------------<  95      [04-27-22 @ 06:49]  4.5   |  22  |  4.20        Ca     7.4     [04-27-22 @ 06:49]      Mg     2.1     [04-27-22 @ 06:49]      Phos  4.3     [04-27-22 @ 06:49]      Creatinine Trend:  SCr 4.20 [04-27 @ 06:49]  SCr 3.72 [04-26 @ 06:46]  SCr 5.48 [04-25 @ 06:17]  SCr 5.35 [04-24 @ 14:49]  SCr 5.07 [04-24 @ 06:18]    Tacrolimus (), Serum: 8.7 ng/mL (04-27 @ 07:17)  Tacrolimus (), Serum: 10.3 ng/mL (04-26 @ 07:36)  Tacrolimus (), Serum: 20.8 ng/mL (04-25 @ 08:34)  Tacrolimus (), Serum: 12.4 ng/mL (04-24 @ 07:48)      CAPILLARY BLOOD GLUCOSE    POCT Blood Glucose.: 424 mg/dL (27 Apr 2022 12:58)  POCT Blood Glucose.: 466 mg/dL (27 Apr 2022 12:57)  POCT Blood Glucose.: 119 mg/dL (27 Apr 2022 08:44)  POCT Blood Glucose.: 215 mg/dL (26 Apr 2022 21:26)  POCT Blood Glucose.: 313 mg/dL (26 Apr 2022 17:03)      Iron 55, TIBC 191, %sat 29      [02-04-22 @ 00:04]  Ferritin 1287      [02-04-22 @ 00:04]  PTH -- (Ca 9.2)      [02-05-22 @ 10:13]   294  HbA1c 6.0      [02-21-20 @ 08:53]  Lipid: chol 118, TG 54, HDL 59, LDL --      [06-27-21 @ 09:44]    HBsAb 99232.2      [04-21-22 @ 14:13]  HBsAg Nonreact      [04-21-22 @ 14:13]  HBcAb Reactive      [04-21-22 @ 14:13]  HCV 0.25, Nonreact      [04-21-22 @ 14:13]  HIV Nonreact      [04-21-22 @ 14:47]

## 2022-04-27 NOTE — PROGRESS NOTE ADULT - ASSESSMENT
Assessment: 66 year old male with history of HTN, HLD, DM (on insulin), ESRD (2/2 DM and HTN) on HD via permacath since 3yrs ago MWF, afib (on eliquis 2.5mg BID), hx of stroke (2019 2/2 afib), focal seizure (on depakote, keppra), hx of CABG (2020), hx of nonbleeding gastric/duodenal ulcer (EGD 2/9/22, on protonix) presents for potential DDRT. Makes minimal urine. Last HD: 4/20 (nephrologist, Dr. Barrientos). He is now s/p DCD DDRT with thymo induction and STENT placement.     [] s/p DDRT w/ Thymo Induction- POD6  -DGF: HD 4/25   -HD today and discharge home on HD  -Improvement in UOP overnight, on lasix 80 PO BID  -Voiding freely, strict I&Os  -CC renal diet  -SCDs  -PRN Pain regimen with Tylenol, Tramadol   -Bowel regimen  -Daily CBC, CMP, Mg/Phos, Coags, Tacro level    [] Immunosuppression  -Thymoglobulin induction (total 4.5mg/kg thus far)   -Envarsus 3 (held due to high level. f/u level this AM), MMF 1g BID, Steroid taper   -Thymoglobulin 100mg + Solumedrol 60mg today   -Daily FK level   -PPX: Valcyte, Nystatin, Bactrim, Pepcid     [] HTN/AFib:  -Continue half home dose Coreg 3.125 BID  -Continue home dose Atorvastatin, Norvasc 10   -Holding home Hydralazine, Telmisartan   -eliquis restarted. per family, patient also takes plavix with eliquis. will dc with plavix    [] IDDM  -POCT glucose q6h  -Continue mISS  -Was on mISS at home     [] Hx CVA (residual bilateral weakness) c/b Seizure Disorder  -Continue home Depakote and Keppra   -restart plavix at home  -PT/OT consult to assist in ambulation     [] Hypothyroidism  -Continue home Synthroid    67 y/o M with h/o HTN, HLD, DM (on insulin), ESRD (2/2 DM and HTN) on HD via permacath since 3yrs ago MWF, afib (on eliquis 2.5mg BID), hx of stroke (2019 2/2 afib), focal seizure (on depakote, keppra), hx of CABG (2020), hx of nonbleeding gastric/duodenal ulcer (EGD 2/9/22, on protonix) presents for potential DDRT. Makes minimal urine. Last HD: 4/20 (nephrologist, Dr. Barrientos). He is now s/p DCD DDRT with thymo induction.     [] POD 6 s/p DDRT with Thymo Induction c/b DGF  - plan for HD today  - continue Lasix 80mg bid   - DC today post HD   - Voiding freely, strict I&Os  - PRN Pain regimen with Tylenol, Tramadol   - Bowel regimen  - Daily CBC, CMP, Mg/Phos, Coags, Tacro level    [] Immunosuppression  - completed thymo induction   - Envarsus by level, MMF 1/1, SST   - PPX: Valcyte, Nystatin, Bactrim, Pepcid     [] HTN/AFib:  -Continue half home dose Coreg 3.125 BID  -Continue home dose Atorvastatin, Norvasc 10   -Holding home Hydralazine, Telmisartan   -Continue Eliquis    [] IDDM  - cont lantus/lispro     [] Hx CVA (residual bilateral weakness) c/b Seizure Disorder  -Continue home Depakote and Keppra   -restart plavix at home  -PT/OT consult to assist in ambulation     [] Hypothyroidism  -Continue home Synthroid     [] dispo   - dc home today  - Outpatient HD reinstated  - f/u in clinic on Monday

## 2022-04-27 NOTE — PROGRESS NOTE ADULT - SUBJECTIVE AND OBJECTIVE BOX
Transplant Surgery - Multidisciplinary Rounds  --------------------------------------------------------------  DDRT Date: 22         POD#6    Present: Patient seen and examined with multidisciplinary team including Transplant Surgeon: Dr. Davis. Nephrologist: Dr. Alfred, pharmacist VENICE Lim, PHIL Styles during am rounds.  Disciplines not in attendance will be notified of the plan.     HPI: 66 year old male with history of HTN, HLD, DM (on insulin), ESRD (2/2 DM and HTN) on HD via permacath since 3yrs ago MWF, afib (on eliquis 2.5mg BID), hx of stroke ( afib), focal seizure (on depakote, keppra), hx of CABG (), hx of nonbleeding gastric/duodenal ulcer (EGD 22, on protonix) presents for potential DDRT. Makes minimal urine. Last HD:  (nephrologist, Dr. Barrientos)     -Now s/p DCD DDRT with thymo induction and STENT placement w/ 22.     OR details: DCD DDRT with Thymo induction with stent placement  1 artery, 1 vein, 1 ureter  Cold ischemia time 22 hr      Recipient:  Srinivasan Castelan  /Age:    1955  65 y/o M  Hx:  DM, HTN, CAD s/p Stent (2019), CVA             Covid Vaccine:   Moderna x 3  Nephrologist: Dr. Sneed  CPRA:    0% Luminex dated 3/2/2022  ABO:      B  CMV:  Positive    EBV Status:  Positive  Last HD:  2022  NPO:  Yes  ALLERGIES: NKDA    Covid Testing: Negative done on admission 22  Pt Location-[address] [place] [address] -Treatment room  Financially cleared- Yes  Recipient OR- 22  1500 pm  Surgeon-Dr. Barber       Donor (import): [facility]    Donor ID: [id number]   Match: [id number]  OPO: [facility]  Age:  57 y.o M  ABO:  B  High Risk: Yes (MSM)    KDPI: 82%  DCD: Yes   22min WIT  COD:  CVA ICH  X Clamp Time: 2022 20:20  Medical Hx:  ETOH abuse, polysubstance abuse  Terminal Cr:  1.34  Covid Testing: Negative NP 2022  CMV-Positive      EBV-Positive  HepBcAb-Negative  Hepatitis C-OPAL- Negative  Hepatitis C ab-Negative     MISMATCH: 1,2,2  Kidney ETA to [facility]:  22 1500  Kidney Laterality: Right  X-match –virtual negative    Interval Events:  -POD#6 DCD DDRT with stent  -eliquis started yesterday  -Postop US: slight increase in flow at iliac a. anastomosis  -HD yesterday, no fluid removal  -UOP increasing, continue monitoring  -lasix 80mg PO BID  -tacrolimus restarted at 2mg (level 10.3) yesterday               Immunosuppression:         Induction: Thymoglobulin completed  Immunosuppression: Env by level, MMF 1g BID, pred taper   Ongoing monitoring for signs of rejection     Potential Discharge date: possible discharge w/ HD    Education:  Medications  Plan of care:  See Below    MEDICATIONS  (STANDING):  amLODIPine   Tablet 10 milliGRAM(s) Oral daily  apixaban 2.5 milliGRAM(s) Oral two times a day  atorvastatin 40 milliGRAM(s) Oral at bedtime  calcium acetate 667 milliGRAM(s) Oral three times a day with meals  carvedilol 3.125 milliGRAM(s) Oral every 12 hours  dextrose 5%. 1000 milliLiter(s) (50 mL/Hr) IV Continuous <Continuous>  dextrose 5%. 1000 milliLiter(s) (100 mL/Hr) IV Continuous <Continuous>  dextrose 50% Injectable 25 Gram(s) IV Push once  dextrose 50% Injectable 12.5 Gram(s) IV Push once  dextrose 50% Injectable 25 Gram(s) IV Push once  diVALproex  milliGRAM(s) Oral <User Schedule>  diVALproex  milliGRAM(s) Oral <User Schedule>  furosemide    Tablet 80 milliGRAM(s) Oral two times a day  glucagon  Injectable 1 milliGRAM(s) IntraMuscular once  insulin glargine Injectable (LANTUS) 6 Unit(s) SubCutaneous at bedtime  insulin lispro (ADMELOG) corrective regimen sliding scale   SubCutaneous three times a day before meals  insulin lispro (ADMELOG) corrective regimen sliding scale   SubCutaneous at bedtime  insulin lispro Injectable (ADMELOG) 9 Unit(s) SubCutaneous three times a day before meals  levETIRAcetam 250 milliGRAM(s) Oral two times a day  levothyroxine 50 MICROGram(s) Oral daily  melatonin 5 milliGRAM(s) Oral at bedtime  mycophenolate mofetil 1 Gram(s) Oral <User Schedule>  nystatin    Suspension 635289 Unit(s) Swish and Swallow four times a day  pantoprazole    Tablet 40 milliGRAM(s) Oral before breakfast  polyethylene glycol 3350 17 Gram(s) Oral every 24 hours  predniSONE   Tablet 5 milliGRAM(s) Oral every 12 hours  senna 2 Tablet(s) Oral at bedtime  sodium bicarbonate 1300 milliGRAM(s) Oral every 12 hours  tacrolimus ER Tablet (ENVARSUS XR) 2 milliGRAM(s) Oral <User Schedule>  trimethoprim   80 mG/sulfamethoxazole 400 mG 1 Tablet(s) Oral daily  valGANciclovir 450 milliGRAM(s) Oral <User Schedule>    MEDICATIONS  (PRN):  chlorhexidine 2% Cloths 1 Application(s) Topical daily PRN cloth  dextrose Oral Gel 15 Gram(s) Oral once PRN Blood Glucose LESS THAN 70 milliGRAM(s)/deciliter  ondansetron Injectable 4 milliGRAM(s) IV Push every 6 hours PRN Nausea and/or Vomiting      PAST MEDICAL & SURGICAL HISTORY:  Hypertension    Diabetes    Dyslipidemia    CAD (Coronary Artery Disease)  with Stents in 2009 and 2019, s/p off-pump C3L on 20    Hypothyroidism    CVA (cerebral vascular accident)  19 with residual bilateral weakness    Anemia    PEG (percutaneous endoscopic gastrostomy) status  removed 2020    Intubation of airway performed without difficulty  Dec 2019  CVA c/b status epilepticus requiring intubation and PEG in 2019-    ESRD on dialysis  M/W/F    Seizures  after CVA, last seizure was last week 22    History of insertion of stent into coronary artery bypass graft   and     S/P CABG x 3  off pump C3L on 20        Vital Signs Last 24 Hrs  T(C): 36.4 (2022 08:54), Max: 36.5 (2022 20:20)  T(F): 97.6 (2022 08:54), Max: 97.7 (2022 20:20)  HR: 58 (2022 08:54) (58 - 65)  BP: 153/74 (2022 08:54) (137/72 - 166/78)  BP(mean): --  RR: 20 (2022 08:54) (18 - 20)  SpO2: 98% (2022 08:54) (98% - 100%)    I&O's Summary    2022 07:01  -  2022 07:00  --------------------------------------------------------  IN: 0 mL / OUT: 1205 mL / NET: -1205 mL    2022 07:01  -  2022 09:55  --------------------------------------------------------  IN: 290 mL / OUT: 425 mL / NET: -135 mL                              9.6    4.83  )-----------( 117      ( 2022 06:39 )             30.6         130<L>  |  93<L>  |  72<H>  ----------------------------<  95  4.5   |  22  |  4.20<H>    Ca    7.4<L>      2022 06:49  Phos  4.3       Mg     2.1         TPro  5.2<L>  /  Alb  2.4<L>  /  TBili  0.3  /  DBili  x   /  AST  18  /  ALT  <5<L>  /  AlkPhos  80      Tacrolimus (), Serum: 10.3 ng/mL ( @ 07:36)      Review of systems  Gen: No weight changes, fatigue, fevers/chills, weakness  Skin: No rashes  Head/Eyes/Ears/Mouth: No headache; Normal hearing; Normal vision w/o blurriness; No sinus pain/discomfort, sore throat  Respiratory: No dyspnea, cough, wheezing, hemoptysis  CV: No chest pain, PND, orthopnea  GI: C/O mild abdominal pain at surgical site. no diarrhea, constipation, nausea, vomiting, melena, hematochezia  : No increased frequency, dysuria, hematuria, nocturia  MSK: No joint pain/swelling; no back pain; no edema  Neuro: No dizziness/lightheadedness, weakness, seizures, numbness, tingling  Heme: No easy bruising or bleeding  Endo: No heat/cold intolerance  Psych: No significant nervousness, anxiety, stress, depression  All other systems were reviewed and are negative, except as noted.      PHYSICAL EXAM:  Constitutional: Well developed / well nourished  Eyes: PERRLA  ENMT: nc/at, no thrush  Neck: supple, central line  Respiratory: CTA B/L  Cardiovascular: RRR  Gastrointestinal: Soft abdomen, ND, appropriate incisional TTP, c/d/i  Genitourinary: Voiding freely  Extremities: SCD's in place and working bilaterally  Vascular: Palpable DP pulses bilaterally.   Neurological: A&O x3  Skin: no rashes, ulcerations, lesions  Musculoskeletal: Moving all extremities  Psychiatric: Responsive  Psychiatric: Responsive     Transplant Surgery - Multidisciplinary Rounds  --------------------------------------------------------------  DDRT Date: 4/21/22         POD#6    Present: Patient seen and examined with multidisciplinary team including Transplant Surgeon: Dr. Davis. Nephrologist: Dr. Alfred, WALDO Esparza, PGY 3 Dr. Broderick, and unit RN during am rounds.  Disciplines not in attendance will be notified of the plan.     HPI: 66 year old male with history of HTN, HLD, DM (on insulin), ESRD (2/2 DM and HTN) on HD via permacath since 3yrs ago MWF, afib (on eliquis 2.5mg BID), hx of stroke (2019 2/2 afib), focal seizure (on depakote, keppra), hx of CABG (2020), hx of nonbleeding gastric/duodenal ulcer (EGD 2/9/22, on Protonix.Admitted for DCD DDRT. Makes minimal urine. Last HD: 4/20 (nephrologist, Dr. Barrientos)     s/p DCD DDRT (1A/1V/1u stented) with Thymo induction. CIT 22hrs. Post op course c/b DGF requiring HD.   Donor: 58, KDPI 82%, DCD, COD: CVA ICH, TCr 1.34, CMV +/EBV +, Hep C neg  MISMATCH: 1,2,2    Interval Events:  - POD#6 DCD DDRT- last HD 4/25; u/o improving 1.2L, on lasix 80mg bid  - Plan for HD today   - Eliquis resumed yest; H/H stable   - no overnight events                Immunosuppression:         Induction: Thymoglobulin completed  Immunosuppression: Env by level, MMF 1g BID, pred taper   Ongoing monitoring for signs of rejection     Potential Discharge date: possible discharge today w/ outpt HD  Education:  Medications  Plan of care:  See Below    MEDICATIONS  (STANDING):  amLODIPine   Tablet 10 milliGRAM(s) Oral daily  apixaban 2.5 milliGRAM(s) Oral two times a day  atorvastatin 40 milliGRAM(s) Oral at bedtime  calcium acetate 667 milliGRAM(s) Oral three times a day with meals  carvedilol 3.125 milliGRAM(s) Oral every 12 hours  dextrose 5%. 1000 milliLiter(s) (50 mL/Hr) IV Continuous <Continuous>  dextrose 5%. 1000 milliLiter(s) (100 mL/Hr) IV Continuous <Continuous>  dextrose 50% Injectable 25 Gram(s) IV Push once  dextrose 50% Injectable 12.5 Gram(s) IV Push once  dextrose 50% Injectable 25 Gram(s) IV Push once  diVALproex  milliGRAM(s) Oral <User Schedule>  diVALproex  milliGRAM(s) Oral <User Schedule>  furosemide    Tablet 80 milliGRAM(s) Oral two times a day  glucagon  Injectable 1 milliGRAM(s) IntraMuscular once  insulin glargine Injectable (LANTUS) 6 Unit(s) SubCutaneous at bedtime  insulin lispro (ADMELOG) corrective regimen sliding scale   SubCutaneous three times a day before meals  insulin lispro (ADMELOG) corrective regimen sliding scale   SubCutaneous at bedtime  insulin lispro Injectable (ADMELOG) 9 Unit(s) SubCutaneous three times a day before meals  levETIRAcetam 250 milliGRAM(s) Oral two times a day  levothyroxine 50 MICROGram(s) Oral daily  melatonin 5 milliGRAM(s) Oral at bedtime  mycophenolate mofetil 1 Gram(s) Oral <User Schedule>  nystatin    Suspension 450815 Unit(s) Swish and Swallow four times a day  pantoprazole    Tablet 40 milliGRAM(s) Oral before breakfast  polyethylene glycol 3350 17 Gram(s) Oral every 24 hours  predniSONE   Tablet 5 milliGRAM(s) Oral every 12 hours  senna 2 Tablet(s) Oral at bedtime  sodium bicarbonate 1300 milliGRAM(s) Oral every 12 hours  tacrolimus ER Tablet (ENVARSUS XR) 2 milliGRAM(s) Oral <User Schedule>  trimethoprim   80 mG/sulfamethoxazole 400 mG 1 Tablet(s) Oral daily  valGANciclovir 450 milliGRAM(s) Oral <User Schedule>    MEDICATIONS  (PRN):  chlorhexidine 2% Cloths 1 Application(s) Topical daily PRN cloth  dextrose Oral Gel 15 Gram(s) Oral once PRN Blood Glucose LESS THAN 70 milliGRAM(s)/deciliter  ondansetron Injectable 4 milliGRAM(s) IV Push every 6 hours PRN Nausea and/or Vomiting    PAST MEDICAL & SURGICAL HISTORY:  Hypertension  Diabetes  Dyslipidemia  CAD (Coronary Artery Disease) with Stents in 06/2009 and 6/2019, s/p off-pump C3L on 8/13/20  Hypothyroidism  CVA (cerebral vascular accident) 12/13/19 with residual bilateral weakness  Anemia  PEG (percutaneous endoscopic gastrostomy) removed July 2020  Intubation of airway performed without difficulty Dec 2019  CVA c/b status epilepticus requiring intubation and PEG in 12/2019-  ESRD on dialysis M/W/F  Seizures after CVA, last seizure was last week 4/07/22  History of insertion of stent into coronary artery bypass graft 2009 and 2019  S/P CABG x 3 off pump C3L on 8/13/20    Vital Signs Last 24 Hrs  T(C): 36.4 (27 Apr 2022 08:54), Max: 36.5 (26 Apr 2022 20:20)  T(F): 97.6 (27 Apr 2022 08:54), Max: 97.7 (26 Apr 2022 20:20)  HR: 58 (27 Apr 2022 08:54) (58 - 65)  BP: 153/74 (27 Apr 2022 08:54) (137/72 - 166/78)  BP(mean): --  RR: 20 (27 Apr 2022 08:54) (18 - 20)  SpO2: 98% (27 Apr 2022 08:54) (98% - 100%)    I&O's Summary    26 Apr 2022 07:01  -  27 Apr 2022 07:00  --------------------------------------------------------  IN: 0 mL / OUT: 1205 mL / NET: -1205 mL    27 Apr 2022 07:01  -  27 Apr 2022 09:55  --------------------------------------------------------  IN: 290 mL / OUT: 425 mL / NET: -135 mL                        9.6    4.83  )-----------( 117      ( 27 Apr 2022 06:39 )             30.6     04-27    130<L>  |  93<L>  |  72<H>  ----------------------------<  95  4.5   |  22  |  4.20<H>    Ca    7.4<L>      27 Apr 2022 06:49  Phos  4.3     04-27  Mg     2.1     04-27    TPro  5.2<L>  /  Alb  2.4<L>  /  TBili  0.3  /  DBili  x   /  AST  18  /  ALT  <5<L>  /  AlkPhos  80  04-27    Tacrolimus (), Serum: 10.3 ng/mL (04-26 @ 07:36)    Review of systems  Gen: No weight changes, fatigue, fevers/chills, weakness  Skin: No rashes  Head/Eyes/Ears/Mouth: No headache; Normal hearing; Normal vision w/o blurriness; No sinus pain/discomfort, sore throat  Respiratory: No dyspnea, cough, wheezing, hemoptysis  CV: No chest pain, PND, orthopnea  GI: C/O mild abdominal pain at surgical site. no diarrhea, constipation, nausea, vomiting, melena, hematochezia  : No increased frequency, dysuria, hematuria, nocturia  MSK: No joint pain/swelling; no back pain; no edema  Neuro: No dizziness/lightheadedness, weakness, seizures, numbness, tingling  Heme: No easy bruising or bleeding  Endo: No heat/cold intolerance  Psych: No significant nervousness, anxiety, stress, depression  All other systems were reviewed and are negative, except as noted.    PHYSICAL EXAM:  Constitutional: Well developed / well nourished  Eyes: PERRLA  ENMT: nc/at, no thrush  Neck: supple, central line  Respiratory: CTA B/L  Cardiovascular: RRR  Gastrointestinal: Soft abdomen, ND, appropriate incisional TTP, c/d/i  Genitourinary: Voiding freely  Extremities: SCD's in place and working bilaterally  Vascular: Palpable DP pulses bilaterally.   Neurological: A&O x3  Skin: no rashes, ulcerations, lesions  Musculoskeletal: Moving all extremities  Psychiatric: Responsive  Psychiatric: Responsive

## 2022-04-27 NOTE — PROGRESS NOTE ADULT - ASSESSMENT
66 yr old man with ESRD due to DM on HD since 2019 presenting for DDRT. Pt underwent DDRT on 4/21/22.     Donor 57 yr old, KDPI 82%, DCD WIT 22 min, Terminal cr 1.34. HLA mismatch 1,2,2. CMV +/+    Kidney transplant recipient, s/p DDRT on 4/21/22.   Pt. with DGF with suboptimal urine output. Last HD done on 4/25/22.  Continue Lasix 80 mg PO BID.   Labs reviewed.   Plan for HD today and discharge post HD.  Pt will need to continue maintenance HD w  -Primary Nephrologist Dr. Rey.   -Monitor labs and urine output.       Diabetes type II - On insulin. Adjust as per BS.      HTN - on Coreg 6.25mg bid,  Amlodipine 5mg po daily , telmisartan 80mg daily and Hydralazine 10mg TID at home.  Continue Amlodipine and Coreg. Monitor BP.     Seizure d/o - h/o recent breakthrough seizure - continue Depakote and Keppra     CAD s/p CABG - stable.     Afib on Eliquis at home - Restart home dose of Eliquis.     Immunosuppression per protocol - Thymo induction. Envarsus, Cellcept, steroids. Infection prophy - Nystatin, bactrim, Valcyte          66 yr old man with ESRD due to DM on HD since 2019 presenting for DDRT. Pt underwent DDRT on 4/21/22.     Donor 57 yr old, KDPI 82%, DCD WIT 22 min, Terminal cr 1.34. HLA mismatch 1,2,2. CMV +/+    Kidney transplant recipient, s/p DDRT on 4/21/22.   Pt. with DGF with suboptimal urine output. Last HD done on 4/25/22.  Continue Lasix 80 mg PO BID.   Labs reviewed.   Plan for HD today and discharge post HD.  Pt will need to continue maintenance HD as per MWF schedule.   Primary Nephrologist Dr. Rey. Discussed with NP.  Monitor labs and urine output.       Diabetes type II - On insulin. Adjust as per BS.      HTN - on Coreg 6.25mg bid,  Amlodipine 5mg po daily , telmisartan 80mg daily and Hydralazine 10mg TID at home.  Continue Amlodipine and Coreg. Monitor BP.     Seizure d/o - h/o recent breakthrough seizure - continue Depakote and Keppra     CAD s/p CABG - stable.     Afib on Eliquis at home - Restart home dose of Eliquis. Cardiology following.     Immunosuppression per protocol - Thymo induction. Envarsus, Cellcept, steroids. Infection prophy - Nystatin, bactrim, Valcyte          66 yr old man with ESRD due to DM on HD since 2019 presenting for DDRT. Pt underwent DDRT on 4/21/22.     Donor 57 yr old, KDPI 82%, DCD WIT 22 min, Terminal cr 1.34. HLA mismatch 1,2,2. CMV +/+    Kidney transplant recipient, s/p DDRT on 4/21/22.   Pt. with DGF with suboptimal urine output. Last HD done on 4/25/22.  Continue Lasix 80 mg PO BID.   Labs reviewed.   Plan for HD today and discharge on dialysis.  Pt will need to continue maintenance HD as per MWF schedule.   Primary Nephrologist Dr. Rey. Discussed with NP.  Monitor labs and urine output.       Diabetes type II - On insulin. Adjust as per BS.      HTN - on Coreg 6.25mg bid,  Amlodipine 5mg po daily , telmisartan 80mg daily and Hydralazine 10mg TID at home.  Continue Amlodipine and Coreg. Monitor BP.     Seizure d/o - h/o recent breakthrough seizure - continue Depakote and Keppra     CAD s/p CABG - stable.     Afib on Eliquis at home - Restart home dose of Eliquis. Cardiology following.     Immunosuppression per protocol - Thymo induction. Envarsus, Cellcept, steroids. Infection prophy - Nystatin, bactrim, Valcyte

## 2022-04-27 NOTE — PROGRESS NOTE ADULT - SUBJECTIVE AND OBJECTIVE BOX
Chief Complaint:     History:    MEDICATIONS  (STANDING):  amLODIPine   Tablet 10 milliGRAM(s) Oral daily  apixaban 2.5 milliGRAM(s) Oral two times a day  atorvastatin 40 milliGRAM(s) Oral at bedtime  calcium acetate 667 milliGRAM(s) Oral three times a day with meals  carvedilol 3.125 milliGRAM(s) Oral every 12 hours  dextrose 5%. 1000 milliLiter(s) (50 mL/Hr) IV Continuous <Continuous>  dextrose 5%. 1000 milliLiter(s) (100 mL/Hr) IV Continuous <Continuous>  dextrose 50% Injectable 25 Gram(s) IV Push once  dextrose 50% Injectable 12.5 Gram(s) IV Push once  dextrose 50% Injectable 25 Gram(s) IV Push once  diVALproex  milliGRAM(s) Oral <User Schedule>  diVALproex  milliGRAM(s) Oral <User Schedule>  furosemide    Tablet 80 milliGRAM(s) Oral two times a day  glucagon  Injectable 1 milliGRAM(s) IntraMuscular once  insulin glargine Injectable (LANTUS) 6 Unit(s) SubCutaneous at bedtime  insulin lispro (ADMELOG) corrective regimen sliding scale   SubCutaneous three times a day before meals  insulin lispro (ADMELOG) corrective regimen sliding scale   SubCutaneous at bedtime  insulin lispro Injectable (ADMELOG) 9 Unit(s) SubCutaneous three times a day before meals  levETIRAcetam 250 milliGRAM(s) Oral two times a day  levETIRAcetam 250 milliGRAM(s) Oral once  levothyroxine 50 MICROGram(s) Oral daily  melatonin 5 milliGRAM(s) Oral at bedtime  mycophenolate mofetil 1 Gram(s) Oral <User Schedule>  nystatin    Suspension 488659 Unit(s) Swish and Swallow four times a day  pantoprazole    Tablet 40 milliGRAM(s) Oral before breakfast  polyethylene glycol 3350 17 Gram(s) Oral every 24 hours  predniSONE   Tablet 5 milliGRAM(s) Oral every 12 hours  senna 2 Tablet(s) Oral at bedtime  sodium bicarbonate 1300 milliGRAM(s) Oral every 12 hours  tacrolimus ER Tablet (ENVARSUS XR) 2 milliGRAM(s) Oral <User Schedule>  trimethoprim   80 mG/sulfamethoxazole 400 mG 1 Tablet(s) Oral daily  valGANciclovir 450 milliGRAM(s) Oral <User Schedule>    MEDICATIONS  (PRN):  chlorhexidine 2% Cloths 1 Application(s) Topical daily PRN cloth  dextrose Oral Gel 15 Gram(s) Oral once PRN Blood Glucose LESS THAN 70 milliGRAM(s)/deciliter  ondansetron Injectable 4 milliGRAM(s) IV Push every 6 hours PRN Nausea and/or Vomiting      Allergies    No Known Allergies    Intolerances      Review of Systems:  Constitutional: No fever  Eyes: No blurry vision  Neuro: No tremors  HEENT: No pain  Cardiovascular: No chest pain, palpitations  Respiratory: No SOB, no cough  GI: No nausea, vomiting, abdominal pain  : No dysuria  Skin: no rash  Psych: no depression  Endocrine: no polyuria, polydipsia  Hem/lymph: no swelling  Osteoporosis: no fractures    ALL OTHER SYSTEMS REVIEWED AND NEGATIVE    UNABLE TO OBTAIN    PHYSICAL EXAM:  VITALS: T(C): 36.6 (04-27-22 @ 12:30)  T(F): 97.8 (04-27-22 @ 12:30), Max: 97.8 (04-27-22 @ 12:30)  HR: 61 (04-27-22 @ 12:30) (58 - 65)  BP: 149/69 (04-27-22 @ 12:30) (137/72 - 158/79)  RR:  (18 - 20)  SpO2:  (98% - 100%)  Wt(kg): --  GENERAL: NAD, well-groomed, well-developed  EYES: No proptosis, no lid lag, anicteric  HEENT:  Atraumatic, Normocephalic, moist mucous membranes  THYROID: Normal size, no palpable nodules  RESPIRATORY: Clear to auscultation bilaterally; No rales, rhonchi, wheezing, or rubs  CARDIOVASCULAR: Regular rate and rhythm; No murmurs; no peripheral edema  GI: Soft, nontender, non distended, normal bowel sounds  SKIN: Dry, intact, No rashes or lesions  MUSCULOSKELETAL: Full range of motion, normal strength  NEURO: sensation intact, extraocular movements intact, no tremor, normal reflexes  PSYCH: Alert and oriented x 3, normal affect, normal mood  CUSHING'S SIGNS: no striae    POCT Blood Glucose.: 424 mg/dL (04-27-22 @ 12:58)  POCT Blood Glucose.: 466 mg/dL (04-27-22 @ 12:57)  POCT Blood Glucose.: 119 mg/dL (04-27-22 @ 08:44)  POCT Blood Glucose.: 215 mg/dL (04-26-22 @ 21:26)  POCT Blood Glucose.: 313 mg/dL (04-26-22 @ 17:03)  POCT Blood Glucose.: 298 mg/dL (04-26-22 @ 12:58)  POCT Blood Glucose.: 308 mg/dL (04-26-22 @ 08:51)  POCT Blood Glucose.: 181 mg/dL (04-25-22 @ 21:13)  POCT Blood Glucose.: 80 mg/dL (04-25-22 @ 16:50)  POCT Blood Glucose.: 319 mg/dL (04-25-22 @ 12:40)  POCT Blood Glucose.: 327 mg/dL (04-25-22 @ 08:27)  POCT Blood Glucose.: 202 mg/dL (04-24-22 @ 21:09)  POCT Blood Glucose.: 233 mg/dL (04-24-22 @ 17:04)      04-27    130<L>  |  93<L>  |  72<H>  ----------------------------<  95  4.5   |  22  |  4.20<H>    EGFR if : x   EGFR if non : x     Ca    7.4<L>      04-27  Mg     2.1     04-27  Phos  4.3     04-27    TPro  5.2<L>  /  Alb  2.4<L>  /  TBili  0.3  /  DBili  x   /  AST  18  /  ALT  <5<L>  /  AlkPhos  80  04-27          Thyroid Function Tests:                           Chief Complaint: T2DM    History: Patient has been tapered to prednisone 5mg BID. Had apple sauce this AM with breakfast.     MEDICATIONS  (STANDING):  amLODIPine   Tablet 10 milliGRAM(s) Oral daily  apixaban 2.5 milliGRAM(s) Oral two times a day  atorvastatin 40 milliGRAM(s) Oral at bedtime  calcium acetate 667 milliGRAM(s) Oral three times a day with meals  carvedilol 3.125 milliGRAM(s) Oral every 12 hours  dextrose 5%. 1000 milliLiter(s) (50 mL/Hr) IV Continuous <Continuous>  dextrose 5%. 1000 milliLiter(s) (100 mL/Hr) IV Continuous <Continuous>  dextrose 50% Injectable 25 Gram(s) IV Push once  dextrose 50% Injectable 12.5 Gram(s) IV Push once  dextrose 50% Injectable 25 Gram(s) IV Push once  diVALproex  milliGRAM(s) Oral <User Schedule>  diVALproex  milliGRAM(s) Oral <User Schedule>  furosemide    Tablet 80 milliGRAM(s) Oral two times a day  glucagon  Injectable 1 milliGRAM(s) IntraMuscular once  insulin glargine Injectable (LANTUS) 6 Unit(s) SubCutaneous at bedtime  insulin lispro (ADMELOG) corrective regimen sliding scale   SubCutaneous three times a day before meals  insulin lispro (ADMELOG) corrective regimen sliding scale   SubCutaneous at bedtime  insulin lispro Injectable (ADMELOG) 9 Unit(s) SubCutaneous three times a day before meals  levETIRAcetam 250 milliGRAM(s) Oral two times a day  levETIRAcetam 250 milliGRAM(s) Oral once  levothyroxine 50 MICROGram(s) Oral daily  melatonin 5 milliGRAM(s) Oral at bedtime  mycophenolate mofetil 1 Gram(s) Oral <User Schedule>  nystatin    Suspension 859073 Unit(s) Swish and Swallow four times a day  pantoprazole    Tablet 40 milliGRAM(s) Oral before breakfast  polyethylene glycol 3350 17 Gram(s) Oral every 24 hours  predniSONE   Tablet 5 milliGRAM(s) Oral every 12 hours  senna 2 Tablet(s) Oral at bedtime  sodium bicarbonate 1300 milliGRAM(s) Oral every 12 hours  tacrolimus ER Tablet (ENVARSUS XR) 2 milliGRAM(s) Oral <User Schedule>  trimethoprim   80 mG/sulfamethoxazole 400 mG 1 Tablet(s) Oral daily  valGANciclovir 450 milliGRAM(s) Oral <User Schedule>    MEDICATIONS  (PRN):  chlorhexidine 2% Cloths 1 Application(s) Topical daily PRN cloth  dextrose Oral Gel 15 Gram(s) Oral once PRN Blood Glucose LESS THAN 70 milliGRAM(s)/deciliter  ondansetron Injectable 4 milliGRAM(s) IV Push every 6 hours PRN Nausea and/or Vomiting      Allergies    No Known Allergies    Intolerances      Review of Systems:  Constitutional: No fever  Eyes: No blurry vision  Neuro: No tremors  HEENT: No pain  Cardiovascular: No chest pain, palpitations  Respiratory: No SOB, no cough  GI: No nausea, vomiting, abdominal pain  : No dysuria  Skin: no rash  Psych: no depression  Endocrine: no polyuria, polydipsia  Hem/lymph: no swelling  Osteoporosis: no fractures    ALL OTHER SYSTEMS REVIEWED AND NEGATIVE      PHYSICAL EXAM:  VITALS: T(C): 36.6 (04-27-22 @ 12:30)  T(F): 97.8 (04-27-22 @ 12:30), Max: 97.8 (04-27-22 @ 12:30)  HR: 61 (04-27-22 @ 12:30) (58 - 65)  BP: 149/69 (04-27-22 @ 12:30) (137/72 - 158/79)  RR:  (18 - 20)  SpO2:  (98% - 100%)  Wt(kg): --  GENERAL: NAD, well-groomed, well-developed  EYES: No proptosis, no lid lag, anicteric  HEENT:  Atraumatic, Normocephalic, moist mucous membranes  THYROID: Normal size, no palpable nodules  RESPIRATORY: Clear to auscultation bilaterally; No rales, rhonchi, wheezing, or rubs  CARDIOVASCULAR: Regular rate and rhythm; No murmurs; no peripheral edema  GI: Soft, nontender, non distended, normal bowel sounds  SKIN: Dry, intact, No rashes or lesions  MUSCULOSKELETAL: Full range of motion, normal strength  NEURO: sensation intact, extraocular movements intact, no tremor, normal reflexes  PSYCH: Alert and oriented x 3, normal affect, normal mood  CUSHING'S SIGNS: no striae    POCT Blood Glucose.: 424 mg/dL (04-27-22 @ 12:58)  POCT Blood Glucose.: 466 mg/dL (04-27-22 @ 12:57)  POCT Blood Glucose.: 119 mg/dL (04-27-22 @ 08:44)  POCT Blood Glucose.: 215 mg/dL (04-26-22 @ 21:26)  POCT Blood Glucose.: 313 mg/dL (04-26-22 @ 17:03)  POCT Blood Glucose.: 298 mg/dL (04-26-22 @ 12:58)  POCT Blood Glucose.: 308 mg/dL (04-26-22 @ 08:51)  POCT Blood Glucose.: 181 mg/dL (04-25-22 @ 21:13)  POCT Blood Glucose.: 80 mg/dL (04-25-22 @ 16:50)  POCT Blood Glucose.: 319 mg/dL (04-25-22 @ 12:40)  POCT Blood Glucose.: 327 mg/dL (04-25-22 @ 08:27)  POCT Blood Glucose.: 202 mg/dL (04-24-22 @ 21:09)  POCT Blood Glucose.: 233 mg/dL (04-24-22 @ 17:04)      04-27    130<L>  |  93<L>  |  72<H>  ----------------------------<  95  4.5   |  22  |  4.20<H>    EGFR if : x   EGFR if non : x     Ca    7.4<L>      04-27  Mg     2.1     04-27  Phos  4.3     04-27    TPro  5.2<L>  /  Alb  2.4<L>  /  TBili  0.3  /  DBili  x   /  AST  18  /  ALT  <5<L>  /  AlkPhos  80  04-27          Thyroid Function Tests:

## 2022-04-27 NOTE — PROGRESS NOTE ADULT - SUBJECTIVE AND OBJECTIVE BOX
[facility] [place] [facility]   MD NINA MASON MD, PA INJELLIOTT SKYE MUNOZ    TEL:  OFFICE: [phone]    From 5pm-7am Answering Service [phone]    -- RENAL FOLLOW UP NOTE ---Date of Service 04-27-22 @ 14:53    Patient is a 66y old  Male who presents with a chief complaint of ESRD (27 Apr 2022 14:24)      Patient seen and examined at bedside. No chest pain/sob    VITALS:  T(F): 97.8 (04-27-22 @ 12:30), Max: 97.8 (04-27-22 @ 12:30)  HR: 61 (04-27-22 @ 12:30)  BP: 149/69 (04-27-22 @ 12:30)  RR: 20 (04-27-22 @ 12:30)  SpO2: 100% (04-27-22 @ 12:30)  Wt(kg): --    04-26 @ 07:01  -  04-27 @ 07:00  --------------------------------------------------------  IN: 0 mL / OUT: 1205 mL / NET: -1205 mL    04-27 @ 07:01  -  04-27 @ 14:53  --------------------------------------------------------  IN: 710 mL / OUT: 575 mL / NET: 135 mL          PHYSICAL EXAM:  Constitutional: NAD  Neck: No JVD  Respiratory: CTAB, no wheezes, rales or rhonchi  Cardiovascular: S1, S2, RRR  Gastrointestinal: BS+, soft, NT/ND  Extremities: No peripheral edema    Hospital Medications:   MEDICATIONS  (STANDING):  amLODIPine   Tablet 10 milliGRAM(s) Oral daily  apixaban 2.5 milliGRAM(s) Oral two times a day  atorvastatin 40 milliGRAM(s) Oral at bedtime  calcium acetate 667 milliGRAM(s) Oral three times a day with meals  carvedilol 3.125 milliGRAM(s) Oral every 12 hours  dextrose 5%. 1000 milliLiter(s) (50 mL/Hr) IV Continuous <Continuous>  dextrose 5%. 1000 milliLiter(s) (100 mL/Hr) IV Continuous <Continuous>  dextrose 50% Injectable 25 Gram(s) IV Push once  dextrose 50% Injectable 12.5 Gram(s) IV Push once  dextrose 50% Injectable 25 Gram(s) IV Push once  diVALproex  milliGRAM(s) Oral <User Schedule>  diVALproex  milliGRAM(s) Oral <User Schedule>  furosemide    Tablet 80 milliGRAM(s) Oral two times a day  glucagon  Injectable 1 milliGRAM(s) IntraMuscular once  insulin glargine Injectable (LANTUS) 6 Unit(s) SubCutaneous at bedtime  insulin lispro (ADMELOG) corrective regimen sliding scale   SubCutaneous three times a day before meals  insulin lispro (ADMELOG) corrective regimen sliding scale   SubCutaneous at bedtime  insulin lispro Injectable (ADMELOG) 9 Unit(s) SubCutaneous three times a day before meals  levETIRAcetam 250 milliGRAM(s) Oral two times a day  levETIRAcetam 250 milliGRAM(s) Oral once  levothyroxine 50 MICROGram(s) Oral daily  melatonin 5 milliGRAM(s) Oral at bedtime  mycophenolate mofetil 1 Gram(s) Oral <User Schedule>  nystatin    Suspension 853810 Unit(s) Swish and Swallow four times a day  pantoprazole    Tablet 40 milliGRAM(s) Oral before breakfast  polyethylene glycol 3350 17 Gram(s) Oral every 24 hours  predniSONE   Tablet 5 milliGRAM(s) Oral every 12 hours  senna 2 Tablet(s) Oral at bedtime  sodium bicarbonate 1300 milliGRAM(s) Oral every 12 hours  tacrolimus ER Tablet (ENVARSUS XR) 2 milliGRAM(s) Oral <User Schedule>  trimethoprim   80 mG/sulfamethoxazole 400 mG 1 Tablet(s) Oral daily  valGANciclovir 450 milliGRAM(s) Oral <User Schedule>    Tacrolimus (), Serum: 8.7 ng/mL (04-27 @ 07:17)    LABS:  04-27    130<L>  |  93<L>  |  72<H>  ----------------------------<  95  4.5   |  22  |  4.20<H>    Ca    7.4<L>      27 Apr 2022 06:49  Phos  4.3     04-27  Mg     2.1     04-27    TPro  5.2<L>  /  Alb  2.4<L>  /  TBili  0.3  /  DBili      /  AST  18  /  ALT  <5<L>  /  AlkPhos  80  04-27    Creatinine Trend: 4.20 <--, 3.72 <--, 5.48 <--, 5.35 <--, 5.07 <--, 3.72 <--, 6.56 <--, 6.21 <--, 5.62 <--, 5.64 <--, 5.14 <--, 5.08 <--    Albumin, Serum: 2.4 g/dL (04-27 @ 06:49)  Phosphorus Level, Serum: 4.3 mg/dL (04-27 @ 06:49)                              9.6    4.83  )-----------( 117      ( 27 Apr 2022 06:39 )             30.6     Urine Studies:      Iron 55, TIBC 191, %sat 29      [02-04-22 @ 00:04]  Ferritin 1287      [02-04-22 @ 00:04]  PTH -- (Ca 9.2)      [02-05-22 @ 10:13]   294  HbA1c 6.0      [02-21-20 @ 08:53]  Lipid: chol 118, TG 54, HDL 59, LDL --      [06-27-21 @ 09:44]    HBsAb 94510.2      [04-21-22 @ 14:13]  HBsAg Nonreact      [04-21-22 @ 14:13]  HBcAb Reactive      [04-21-22 @ 14:13]  HCV 0.25, Nonreact      [04-21-22 @ 14:13]  HIV Nonreact      [04-21-22 @ 14:47]      RADIOLOGY & ADDITIONAL STUDIES:

## 2022-04-27 NOTE — PROGRESS NOTE ADULT - ASSESSMENT
This is a 66 year old male with history of HTN, HLD, DM (on insulin), ESRD (2/2 DM and HTN) on HD via permacath since 3yrs ago MWF, afib (on eliquis 2.5mg BID), hx of stroke (2019 2/2 afib), focal seizure (on depakote, keppra), hx of CABG (2020), hx of nonbleeding gastric/duodenal ulcer (EGD 2/9/22, on protonix) presents for DDRT. Nephrology consulted for renal failure.     A/P   DDRT on 4/21/22.   Last HD on 4/22/22  transplant team on board   Immunosuppressant as per transplant team     hyperkalemia, hyponatremia ; s/p HD 04/25/22.  Continue Lasix 80 mg PO BID.   d/c plan after HD today; Pt will need to continue maintenance HD as per [facility] schedule  -Monitor labs and urine output.   HD consent in the cart   Monitor renal function and urine output closely.     HTN   Bp optimal   monitor BP closely     Anemia:  Monitor Hb  blood transfusion as needed

## 2022-04-27 NOTE — PROVIDER CONTACT NOTE (OTHER) - RECOMMENDATIONS
Notify provider. Follow sliding scale, administer pre-meal (9 units) and sliding scale (6 units).
please come assess swelling

## 2022-04-27 NOTE — PROGRESS NOTE ADULT - ASSESSMENT
66 yr old M with ESRD and Hx of CVA and CAD s/p CABG, Type 2 DM A1C 7.3 here s/p DDRT on 3/21. Endocrine consulted for steroid exacerbated hyperglycemia.   At home, he is on  He now takes only Novolog 3-5 units (based on correctional scale) before.     # stress hyperglycemia   # type 2 DM, uncontrolled   # CKD  - Patient has been tapered from 250mg followed by 125mg (4/23) to solumedrol 60mg IV on 4/24.    4/25: switched to prednisone 20 mg BID today.    4/26: Prednisone 10 mg po BID   4/27: Prednisone 5 mg BID   4/28: Prednisone 5 mg daily   - While inpatient CHO diet and FS AC and HS  - Goal Glucose 100-180    DISCHARGE PLAN:   - Recommend discharge on Basaglar 7 Units HS and Admelog 7 Units TIDAC   - Patient will f/u with Dr Lincoln for further insulin titration as outpt    # Hypertension.   - Mgmt per primary team.    # Hypothyroidism.   -  Continue levothyroxine 50mcg daily     Discussed with team.  Mendy Sierra DO

## 2022-04-28 NOTE — H&P ADULT - NSHPREVIEWOFSYSTEMS_GEN_ALL_CORE
Gen: No weight changes, fatigue, fevers/chills, +weakness  Skin: No rashes  Head/Eyes/Ears/Mouth: No headache; Normal hearing; Normal vision w/o blurriness; No sinus pain/discomfort, sore throat  Respiratory: No dyspnea, cough, wheezing, hemoptysis  CV: No chest pain, PND, orthopnea  GI: No abdominal pain, diarrhea, constipation, nausea, vomiting, melena, hematochezia  : No increased frequency, dysuria, hematuria, nocturia  MSK: No joint pain/swelling; no back pain; no edema  Neuro: No dizziness/lightheadedness, weakness, seizures, numbness, tingling  Heme: No easy bruising or bleeding  Endo: No heat/cold intolerance  Psych: No significant nervousness, anxiety, stress, depression  All other systems were reviewed and are negative, except as noted.

## 2022-04-28 NOTE — PHYSICAL THERAPY INITIAL EVALUATION ADULT - GAIT DEVIATIONS NOTED, PT EVAL
decreased pedro/increased time in double stance/decreased velocity of limb motion/decreased stride length/decreased weight-shifting ability

## 2022-04-28 NOTE — PROVIDER CONTACT NOTE (OTHER) - REASON
patients scrotum had an increase in swelling from AM to now
Elevated Blood Glucose Level- 466, 424
Pt /92
K+ 6.0
Pt's BS at 1743 is 66.

## 2022-04-28 NOTE — CONSULT NOTE ADULT - SUBJECTIVE AND OBJECTIVE BOX
[facility] DIVISION OF KIDNEY DISEASES AND HYPERTENSION -- [phone]  -- INITIAL CONSULT NOTE  --------------------------------------------------------------------------------  HPI: 66 yr old man with ESRD due to DM on HD since 2019 presenting for DDRT. Pt underwent DDRT on 22 from 58 yr old donor, KDPI 82%, DCD WIT 22 min, Terminal cr 1.34. HLA mismatch 1,2,2. CMV +/+. Pt post op had DGF and remained HD dependant. Pt was discharged home last night (22) after HD. However pt this AM was noted to have increased lethargy and inability to get out of bed necessitating hospital admission.    Pt. seen and examined, son at bedside. Pt says he feels weak. Denies fever, chills, nausea, vomiting, hematuria.     PAST HISTORY  --------------------------------------------------------------------------------  PAST MEDICAL & SURGICAL HISTORY:  Hypertension    Diabetes    Dyslipidemia    CAD (Coronary Artery Disease)  with Stents in 2009 and 2019, s/p off-pump C3L on 20    Hypothyroidism    CVA (cerebral vascular accident)  19 with residual bilateral weakness    Anemia    PEG (percutaneous endoscopic gastrostomy) status  removed 2020    Intubation of airway performed without difficulty  Dec 2019  CVA c/b status epilepticus requiring intubation and PEG in 2019-    ESRD on dialysis  M/W/F    Seizures  after CVA, last seizure was last week 22    History of insertion of stent into coronary artery bypass graft   and     S/P CABG x 3  off pump C3L on 20      FAMILY HISTORY:  Family history of cancer of tongue (Father)  Parents are  . Father - at 80 years, tongue cancer was a smoker. Mother- at 71, had accident while crossing road. Siblings- 2 brothers and one sister.      PAST SOCIAL HISTORY:    ALLERGIES & MEDICATIONS  --------------------------------------------------------------------------------  Allergies    No Known Allergies    Intolerances      Standing Inpatient Medications    PRN Inpatient Medications      REVIEW OF SYSTEMS  --------------------------------------------------------------------------------  Gen: malaise+, generalized weakness+  Skin: No rashes  Head/Eyes/Ears: Normal hearing,   Respiratory: No dyspnea, cough  CV: No chest pain  GI: No abdominal pain, diarrhea  : No dysuria, hematuria, as per HPI  MSK: No  edema  Heme: No easy bruising or bleeding    All other systems were reviewed and are negative, except as noted.    VITALS/PHYSICAL EXAM  --------------------------------------------------------------------------------  T(C): 36.4 (22 @ 21:00), Max: 36.7 (22 @ 17:22)  HR: 63 (22 @ 14:20) (61 - 74)  BP: 125/67 (22 @ 14:20) (125/67 - 198/92)  RR: 18 (22 @ 14:20) (18 - 18)  SpO2: 96% (22 @ 14:20) (96% - 100%)  Wt(kg): --  Height (cm): 177.8 (22 @ 14:20)  Weight (kg): 68.9 (22 @ 14:20)  BMI (kg/m2): 21.8 (22 @ 14:20)  BSA (m2): 1.86 (22 @ 14:20)      Physical Exam:  	Gen: tired appearing male  	HEENT: Anicteric  	Pulm: CTA B/L  	CV: S1S2+  	Abd: Soft, +BS               Transplant site: RLQ non tender+, surgical site c/d/i  	Ext: trace  LLE>RLE edema B/L  	Neuro: Awake  	Skin: Warm and dry  	Vascular access: Left internal jugular tunnelled HD cath+    LABS/STUDIES  --------------------------------------------------------------------------------              9.6    4.83  >-----------<  117      [22 @ 06:39]              30.6     130  |  93  |  72  ----------------------------<  95      [22 @ 06:49]  4.5   |  22  |  4.20        Ca     7.4     [22 06:49]      Mg     2.1     [22 06:49]      Phos  4.3     [22 06:49]      Creatinine Trend:  SCr 4.20 [:49]  SCr 3.72 [ 06:46]  SCr 5.48 [ 06:17]  SCr 5.35 [ 14:49]  SCr 5.07 [ 06:18]    Urinalysis - [20 @ 14:29]      Color Light Yellow / Appearance Clear / SG 1.011 / pH 8.0      Gluc 500 mg/dL / Ketone Negative  / Bili Negative / Urobili Negative       Blood Trace / Protein 300 mg/dL / Leuk Est Negative / Nitrite Negative      RBC 3 / WBC 1 / Hyaline 1 / Gran  / Sq Epi  / Non Sq Epi 0 / Bacteria Negative      HBsAb 88896.2      [22 @ 14:13]  HBsAg Nonreact      [22 @ 14:13]  HBcAb Reactive      [22 @ 14:13]  HCV 0.25, Nonreact      [22 @ 14:13]  HIV Nonreact      [22 @ 14:47]    Free Light Chains: kappa 22.34, lambda 13.02, ratio = 1.72      [ @ 09:29]  Immunofixation Serum:   No Monoclonal Band Identified    Reference Range: None Detected      [22 @ 09:29]  SPEP Interpretation: Normal Electrophoresis Pattern      [22 @ 22:50]    TacrolimusTacrolimus (), Serum: 8.7 ng/mL ( @ 07:17)  Tacrolimus (), Serum: 10.3 ng/mL ( @ 07:36)  Tacrolimus (), Serum: 20.8 ng/mL ( @ 08:34)

## 2022-04-28 NOTE — H&P ADULT - ASSESSMENT
66M with history of HTN, HLD, DM (on insulin), ESRD (2/2 DM and HTN) on HD via permacath since 3yrs ago MWF, afib (on eliquis 2.5mg BID), hx of stroke (2019 2/2 afib), focal seizure (on depakote, keppra), hx of CABG (2020), hx of nonbleeding gastric/duodenal ulcer (EGD 2/9/22, on protonix) s/p  DCD DDRT ( 1A/1V/1U stented) on 4/21/2022 with Thymoglobulin Induction He was discharged home yesterday w/ outpatient HD (last HD yesterday before discharge) now directly admitted for weakness.    -Admit to Transplant Surgery  -IS: Continue Env 2 mg, MMF 1g BID, Pred 5  -continue home antiseizure meds  -continue ppx meds: bactrim/valcyte/nystatin/protonix  -continue home eliquis  -reg diet.   -strict I/Os  -daily AM labs including tac level  -PT consulted.

## 2022-04-28 NOTE — H&P ADULT - NSHPPHYSICALEXAM_GEN_ALL_CORE
Constitutional: Well developed / well nourished  Eyes: Anicteric, PERRLA  ENMT: nc/at  Neck: Supple  Respiratory: CTA B/L  Cardiovascular: RRR  Gastrointestinal: Soft abdomen, NT, ND. Incision c/d/i  Genitourinary:  Voiding spontaneously  Extremities: SCD's in place and working bilaterally  Vascular: Palpable dp pulses bilaterally  Neurological: A&O x3  Musculoskeletal: Moving all extremities  Psychiatric: Responsive

## 2022-04-28 NOTE — CONSULT NOTE ADULT - PROBLEM SELECTOR RECOMMENDATION 2
Immunosuppression need in kidney recipient.  s/p Thymo induction.   On Envarsus adjust as per levels. target goal 8-10.  Continue Cellcept 1 gm BID.   Prednisone taper.   Infection prophy - Nystatin, bactrim, Valcyte

## 2022-04-28 NOTE — PROVIDER CONTACT NOTE (OTHER) - SITUATION
K+ 6.0. Pt's K+ 5.6 s/p dextrose 50% 50mL IVP and humulin R 5 U IVP at 16:30.
Pt's BS at 1743 is 66.
patients scrotum had an increase in swelling from AM to now
Pt /92
Elevated Blood Glucose Level- 466, 424

## 2022-04-28 NOTE — CONSULT NOTE ADULT - TIME BILLING
Kidney Transplant recipient with delayed functioning allograft  ANA, improving urine output, returned from home due to ADL requiring higher level of support and assistance  Creatinine trend / electrolytes noted  Comorbidities reviewed. Thymo induction  Reviewed available clinical and lab data including history,  progress notes and consult notes.  Reviewed immunosuppression and allograft function including urine out put, creatinine trend, urine studies and  allograft   imaging.  Reviewed medication regimen for  blood pressure control , infection prophylaxis and glycemic control  Suggestions:  Tacrolimus target level 8-10 ng/ml  Will monitor for allograft function recovery with hemodialysis support as needed  Physical medicine evaluation  Possible acute rehab placement   Informed primary nephrologist  D/w transplant team  I was present during and reviewed clinical and lab data as well as assessment and plan as documented. Please contact if any additional questions with any change in clinical condition or on availability of any additional information or reports.

## 2022-04-28 NOTE — PHYSICAL THERAPY INITIAL EVALUATION ADULT - PERTINENT HX OF CURRENT PROBLEM, REHAB EVAL
66 yr old man with ESRD due to DM on HD since 2019 presenting for DDRT. Pt underwent DDRT on 4/21/22 from 58 yr old donor, KDPI 82%, DCD WIT 22 min, Terminal cr 1.34. HLA mismatch 1,2,2. CMV +/+. Pt post op had DGF and remained HD dependant. Pt was discharged home last night (4/27/22) after HD. However pt this AM was noted to have increased lethargy and inability to get out of bed necessitating hospital admission.

## 2022-04-28 NOTE — PROVIDER CONTACT NOTE (OTHER) - BACKGROUND
Pt admitted for DCD DDRT w/ thymo induction and stent placement POD1. CIT 22 hr. PMH HTN. HLD, DM, ESRD, afib, stroke 2019, CABG 2020.
Pt s/p DDRT, post-op day 6. PMH of ESRD, DM and HTN
Pt s/p R DDRT, post-op day 6. PMH of HTN, HLD, DM, ESRD, focal seizure, stroke and CABG.
patient admitted for DDRT
Pt admitted s/p DDRT. PMH: HTN, ESRD, CAD, DM, HLD

## 2022-04-28 NOTE — PHYSICAL THERAPY INITIAL EVALUATION ADULT - BALANCE TRAINING, PT EVAL
GOAL: Pt will increase static/ dynamic standing balance to Fair+ to improve safety with functional activities in 4 weeks.

## 2022-04-28 NOTE — H&P ADULT - HISTORY OF PRESENT ILLNESS
66M with history of HTN, HLD, DM (on insulin), ESRD (2/2 DM and HTN) on HD via permacath since 3yrs ago MWF, afib (on eliquis 2.5mg BID), hx of stroke (2019 2/2 afib), focal seizure (on depakote, keppra), hx of CABG (2020), hx of nonbleeding gastric/duodenal ulcer (EGD 2/9/22, on protonix)    s/p  DCD DDRT ( 1A/1V/1U stented) on 4/21/2022 with Thymoglobulin Induction   - Donor: Donor: 58 y/o M, KDPI 82%, PHS high risk, terminal Cr 1.34, CMV pos, EBV pos, HCV OPAL neg)   - Graft: post op renal doppler with  good perfusion; course c/b delayed graft function requiring HD inpatient and on discharge.  - A fib: resumed Eliquis; Plavix not restarted yet    He was discharged home yesterday w/ outpatient HD (last HD yesterday before discharge) now directly admitted after family called stating patient felt weak and family was not able take care of patient at home. Per son, it was very diffcult for the patient to climb the 6 stairs required to go inside the house. Son states that patient has had increased weakness for past few weeks since before the transplant and family had planned to get him into physical therapy; however he was offered the kidney transplant therefore the postponed the PT plans.     Patient currently AOX3, NAD. Sensory/motor intact. Denies fevers/chills.

## 2022-04-28 NOTE — CONSULT NOTE ADULT - PROBLEM SELECTOR RECOMMENDATION 9
Kidney transplant recipient, s/p DDRT on 4/21/22.   Pt. with DGF with suboptimal urine output and HD dependant. Last HD done on 4/27/22.  Continue Lasix 80 mg PO BID.   Pt will need to continue maintenance HD as per MWF schedule. Will monitor for need for HD.   Check labs today.  Primary Nephrologist Dr. Rey/Iliana.   Monitor labs and urine output.

## 2022-04-28 NOTE — CONSULT NOTE ADULT - SUBJECTIVE AND OBJECTIVE BOX
Dr. Barrientos  Office [phone] (9 am to 5 pm)  Service : [phone] ( 5pm to 9 am)    SKYE Estrada, TAMMY    RENAL INITIAL CONSULT NOTE: DATE OF SERVICE 22 @ 21:27    HPI:  66M with history of HTN, HLD, DM (on insulin), ESRD (2/2 DM and HTN) on HD via permacath since 3yrs ago MWF, afib (on eliquis 2.5mg BID), hx of stroke (2019/2 afib), focal seizure (on depakote, keppra), hx of CABG (), hx of nonbleeding gastric/duodenal ulcer (EGD 22, on protonix)    s/p  DCD DDRT ( 1A/1V/1U stented) on 2022 with Thymoglobulin Induction   - Donor: Donor: 58 y/o M, KDPI 82%, PHS high risk, terminal Cr 1.34, CMV pos, EBV pos, HCV OPAL neg)   - Graft: post op renal doppler with  good perfusion; course c/b delayed graft function requiring HD inpatient and on discharge.  - A fib: resumed Eliquis; Plavix not restarted yet    He was discharged home yesterday w/ outpatient HD (last HD yesterday before discharge) now directly admitted after family called stating patient felt weak and family was not able take care of patient at home. Per son, it was very diffcult for the patient to climb the 6 stairs required to go inside the house. Son states that patient has had increased weakness for past few weeks since before the transplant and family had planned to get him into physical therapy; however he was offered the kidney transplant therefore the postponed the PT plans.     Patient currently AOX3, NAD. Sensory/motor intact. Denies fevers/chills.       Allergies:  No Known Allergies      PAST MEDICAL & SURGICAL HISTORY:  Hypertension    Diabetes    Dyslipidemia    CAD (Coronary Artery Disease)  with Stents in 2009 and 2019, s/p off-pump C3L on 20    Hypothyroidism    CVA (cerebral vascular accident)  19 with residual bilateral weakness    Anemia    PEG (percutaneous endoscopic gastrostomy) status  removed 2020    Intubation of airway performed without difficulty  Dec 2019  CVA c/b status epilepticus requiring intubation and PEG in 2019-    ESRD on dialysis  M/W/F    Seizures  after CVA, last seizure was last week 22    History of insertion of stent into coronary artery bypass graft   and     S/P CABG x 3  off pump C3L on 20        Home Medications Reviewed    Hospital Medications:   MEDICATIONS  (STANDING):  apixaban 2.5 milliGRAM(s) Oral every 12 hours  atorvastatin 40 milliGRAM(s) Oral at bedtime  carvedilol 3.125 milliGRAM(s) Oral every 12 hours  dextrose 5%. 1000 milliLiter(s) (50 mL/Hr) IV Continuous <Continuous>  dextrose 5%. 1000 milliLiter(s) (100 mL/Hr) IV Continuous <Continuous>  dextrose 50% Injectable 25 Gram(s) IV Push once  dextrose 50% Injectable 12.5 Gram(s) IV Push once  dextrose 50% Injectable 25 Gram(s) IV Push once  diVALproex  milliGRAM(s) Oral <User Schedule>  diVALproex  milliGRAM(s) Oral <User Schedule>  furosemide    Tablet 80 milliGRAM(s) Oral two times a day  glucagon  Injectable 1 milliGRAM(s) IntraMuscular once  insulin glargine Injectable (LANTUS) 7 Unit(s) SubCutaneous at bedtime  insulin lispro (ADMELOG) corrective regimen sliding scale   SubCutaneous three times a day before meals  insulin lispro Injectable (ADMELOG) 7 Unit(s) SubCutaneous three times a day before meals  levETIRAcetam 250 milliGRAM(s) Oral every 12 hours  mycophenolate mofetil 1000 milliGRAM(s) Oral <User Schedule>  nystatin    Suspension 980120 Unit(s) Oral four times a day  pantoprazole    Tablet 40 milliGRAM(s) Oral before breakfast  sevelamer carbonate 800 milliGRAM(s) Oral every 8 hours  sodium bicarbonate 1300 milliGRAM(s) Oral every 12 hours  valGANciclovir 450 milliGRAM(s) Oral <User Schedule>      SOCIAL HISTORY:  Denies ETOh, Smoking,     FAMILY HISTORY:  Family history of cancer of tongue (Father)  Parents are  . Father - at 80 years, tongue cancer was a smoker. Mother- at 71, had accident while crossing road. Siblings- 2 brothers and one sister.        REVIEW OF SYSTEMS:  CONSTITUTIONAL: No weakness, fevers or chills  EYES/ENT: No visual changes;  No vertigo or throat pain   NECK: No pain or stiffness  RESPIRATORY: No cough, wheezing, hemoptysis; No shortness of breath  CARDIOVASCULAR: No chest pain or palpitations.  GASTROINTESTINAL: No abdominal or epigastric pain. No nausea, vomiting, or hematemesis; No diarrhea or constipation. No melena or hematochezia.  GENITOURINARY: No dysuria, frequency, foamy urine, urinary urgency, incontinence or hematuria  NEUROLOGICAL: No numbness or weakness  SKIN: No itching, burning, rashes, or lesions   VASCULAR: No bilateral lower extremity edema.   All other review of systems is negative unless indicated above.    VITALS:  T(F): 97.5 (22 @ 19:45), Max: 97.9 (22 @ 14:20)  HR: 62 (22 @ 19:45)  BP: 127/61 (22 @ 19:45)  RR: 18 (22 @ 19:45)  SpO2: 100% (22 @ 19:45)  Wt(kg): --     @ 07:01  -   @ 21:27  --------------------------------------------------------  IN: 480 mL / OUT: 0 mL / NET: 480 mL      Height (cm): 177.8 ( @ 14:20)  Weight (kg): 68.9 ( 14:20)  BMI (kg/m2): 21.8 ( @ 14:20)  BSA (m2): 1.86 ( @ 14:20)    PHYSICAL EXAM:  Constitutional: NAD  HEENT: anicteric sclera, oropharynx clear, MMM  Neck: No JVD  Respiratory: CTAB, no wheezes, rales or rhonchi  Cardiovascular: S1, S2, RRR  Gastrointestinal: BS+, soft, NT/ND  Extremities: No cyanosis or clubbing. No peripheral edema  Neurological: A/O x 3, no focal deficits  Psychiatric: Normal mood, normal affect  : No CVA tenderness. No tucker.   Skin: No rashes  Vascular Access: permacath    LABS:      132<L>  |  94<L>  |  54<H>  ----------------------------<  244<H>  5.1   |  22  |  3.44<H>    Ca    7.7<L>      2022 17:35  Phos  5.1       Mg     2.0         TPro  5.2<L>  /  Alb  2.6<L>  /  TBili  0.4  /  DBili      /  AST  24  /  ALT  10  /  AlkPhos  77      Creatinine Trend: 3.44 <--, 4.20 <--, 3.72 <--, 5.48 <--, 5.35 <--, 5.07 <--, 3.72 <--, 6.56 <--, 6.21 <--, 5.62 <--, 5.64 <--, 5.14 <--                        6.4    10.86 )-----------( 117      ( 2022 18:48 )             21.1     Urine Studies:        RADIOLOGY & ADDITIONAL STUDIES:

## 2022-04-28 NOTE — PROVIDER CONTACT NOTE (OTHER) - ASSESSMENT
Pt A&Ox4. Just completed 3.5 hrs HD w/ no fluid removal. Pt denies any c/o pain, HA, dizziness, CP, vision changes. Pt states he "feels good"
Pt A+Ox4, VSS, denies CP; no c/o pain, discomfort, or distress. Asymptomatic no complaints.
Pt A+Ox4, VSS, denies CP; no c/o pain, discomfort, or distress. No complaints on sweats, dizziness or headache.
patients scrotum had an increase in swelling from AM to now. Looked +1 in AM is now at a +2-3 edema. Patient denies pain or discomfort in the area, son at bedside also concerned for increase in swelling
Pt A+Ox4, VS as per chart. Pt denies any pain, SOB, palpitations, or discomfort at this time. No events on tele, pt sinus salima. Pt due for HD at 22:30

## 2022-04-28 NOTE — PROVIDER CONTACT NOTE (OTHER) - ACTION/TREATMENT ORDERED:
MD Prashant Acosta aware. Pt will undergo HD and then send AM labs early around 04:00
TAMMY Frias made aware. Pt due for PM blood pressure medication. Medication administered. Will recheck BP & continue to monitor.
will come to assess now
Provider notified. Insulin administered, will continue to monitor patient and maintain patient safety.

## 2022-04-29 NOTE — PROGRESS NOTE ADULT - ASSESSMENT
66M with history of HTN, HLD, DM (on insulin), ESRD (2/2 DM and HTN) on HD via permacath since 3yrs ago MWF, afib (on eliquis 2.5mg BID), hx of stroke (2019 2/2 afib), focal seizure (on depakote, keppra), hx of CABG (2020), hx of nonbleeding gastric/duodenal ulcer (EGD 2/9/22, on protonix) s/p  DCD DDRT ( 1A/1V/1U stented) on 4/21/2022 with Thymoglobulin Induction He was discharged home 4/27 w/ outpatient HD (last HD 4/27 before discharge) now directly admitted for weakness, found to have new hematomas around kidney and downtrending h/h on labs.     -IS: Continue Env per level, MMF 1g BID, Pred 5  -continue home antiseizure meds  -continue ppx meds: bactrim/valcyte/nystatin/protonix  -Renal US w/ hematomas and downtrending H/H  -continue to hold home Eliquis 2.5 BID given hematomas and H/H downtrending.  -Transfuse 1u pRBC today and f/u post transfusion CBC  -Plan for HD per transplant nephrology today; will dose keppra 125 post HD  -reg diet.   -strict I/Os  -daily AM labs including tac level  -PT consulted, will f/u recs for rehab. CM aware.

## 2022-04-29 NOTE — PROGRESS NOTE ADULT - ASSESSMENT
66M with history of HTN, HLD, DM (on insulin), ESRD (2/2 DM and HTN) on HD via permacath since 3yrs ago MWF, afib (on eliquis 2.5mg BID), hx of stroke (2019 2/2 afib), focal seizure (on depakote, keppra), hx of CABG (2020), hx of nonbleeding gastric/duodenal ulcer (EGD 2/9/22, on protonix), s/p DDRT, came back due to weakness    s/p  DCD DDRT ( 1A/1V/1U stented) on 4/21/2022 with Thymoglobulin Induction   - Donor: Donor: 56 y/o M, KDPI 82%, PHS high risk, terminal Cr 1.34, CMV pos, EBV pos, HCV OPAL neg)   - Graft: post op renal doppler with  good perfusion; course c/b delayed graft function requiring HD    - A fib: resumed Eliquis; Plavix not restarted yet  - Immunosuppressant per transplant team  - Remains HD dependent, HD as needed. His regular schedule as outpatient is MWF  HD per transplant team ; plan for HD today   - Monitor urine output  -s/w to plan for rehab in [facility]     HTN:  -acceptable   -Monitor BP    Anemia:  -s/p PRBC today  monitor Hgb     MBD:  Calcium corrected is normal  monitor Po4, Ca  66M with history of HTN, HLD, DM (on insulin), ESRD (2/2 DM and HTN) on HD via permacath since 3yrs ago MWF, afib (on eliquis 2.5mg BID), hx of stroke (2019 2/2 afib), focal seizure (on depakote, keppra), hx of CABG (2020), hx of nonbleeding gastric/duodenal ulcer (EGD 2/9/22, on protonix), s/p DDRT, came back due to weakness    s/p  DCD DDRT ( 1A/1V/1U stented) on 4/21/2022 with Thymoglobulin Induction   - Donor: Donor: 56 y/o M, KDPI 82%, PHS high risk, terminal Cr 1.34, CMV pos, EBV pos, HCV OPAL neg)   - Graft: post op renal doppler with  good perfusion; course c/b delayed graft function requiring HD    - A fib: resumed Eliquis; Plavix not restarted yet  - Immunosuppressant per transplant team  - Remains HD dependent, HD as needed. His regular schedule as outpatient is MWF  - planned for HD today   - Monitor urine output  -s/w to plan for rehab in [facility]     HTN:  -acceptable   -Monitor BP    Anemia:  -s/p PRBC today  monitor Hgb     MBD:  Calcium corrected is normal  monitor Po4, Ca

## 2022-04-29 NOTE — PROGRESS NOTE ADULT - SUBJECTIVE AND OBJECTIVE BOX
[facility]   MD NINA MASON MD, PA INJELLIOTT SKYE MUNOZ    TEL:  OFFICE: [phone]    From 5pm-7am Answering Service [phone]    -- RENAL FOLLOW UP NOTE ---Date of Service 04-29-22 @ 15:08    Patient is a 66y old  Male who presents with a chief complaint of Weakness (29 Apr 2022 13:53)      Patient seen and examined at bedside. No chest pain/sob    VITALS:  T(F): 98.5 (04-29-22 @ 13:02), Max: 98.5 (04-29-22 @ 13:02)  HR: 67 (04-29-22 @ 13:02)  BP: 155/73 (04-29-22 @ 13:02)  RR: 18 (04-29-22 @ 13:02)  SpO2: 100% (04-29-22 @ 13:02)  Wt(kg): --    04-28 @ 07:01  -  04-29 @ 07:00  --------------------------------------------------------  IN: 1060 mL / OUT: 430 mL / NET: 630 mL    04-29 @ 07:01  -  04-29 @ 15:08  --------------------------------------------------------  IN: 0 mL / OUT: 400 mL / NET: -400 mL          PHYSICAL EXAM:  Constitutional: NAD  Neck: No JVD  Respiratory: CTAB, no wheezes, rales or rhonchi  Cardiovascular: S1, S2, RRR  Gastrointestinal: BS+, soft, NT/ND  Extremities: + peripheral edema    Hospital Medications:   MEDICATIONS  (STANDING):  amLODIPine   Tablet 10 milliGRAM(s) Oral daily  atorvastatin 40 milliGRAM(s) Oral at bedtime  carvedilol 3.125 milliGRAM(s) Oral every 12 hours  dextrose 5%. 1000 milliLiter(s) (50 mL/Hr) IV Continuous <Continuous>  dextrose 5%. 1000 milliLiter(s) (100 mL/Hr) IV Continuous <Continuous>  dextrose 50% Injectable 25 Gram(s) IV Push once  dextrose 50% Injectable 12.5 Gram(s) IV Push once  dextrose 50% Injectable 25 Gram(s) IV Push once  diVALproex  milliGRAM(s) Oral <User Schedule>  diVALproex  milliGRAM(s) Oral <User Schedule>  furosemide    Tablet 80 milliGRAM(s) Oral two times a day  glucagon  Injectable 1 milliGRAM(s) IntraMuscular once  insulin glargine Injectable (LANTUS) 7 Unit(s) SubCutaneous at bedtime  insulin lispro (ADMELOG) corrective regimen sliding scale   SubCutaneous three times a day before meals  insulin lispro Injectable (ADMELOG) 7 Unit(s) SubCutaneous three times a day before meals  levETIRAcetam 250 milliGRAM(s) Oral every 12 hours  levothyroxine 50 MICROGram(s) Oral daily  mycophenolate mofetil 1000 milliGRAM(s) Oral <User Schedule>  nystatin    Suspension 856862 Unit(s) Oral four times a day  pantoprazole    Tablet 40 milliGRAM(s) Oral before breakfast  predniSONE   Tablet 5 milliGRAM(s) Oral daily  sevelamer carbonate 800 milliGRAM(s) Oral every 8 hours  sodium bicarbonate 1300 milliGRAM(s) Oral every 12 hours  trimethoprim   80 mG/sulfamethoxazole 400 mG 1 Tablet(s) Oral daily  valGANciclovir 450 milliGRAM(s) Oral <User Schedule>    Tacrolimus (), Serum: 7.2 ng/mL (04-29 @ 07:50)    LABS:  04-29    131<L>  |  94<L>  |  64<H>  ----------------------------<  123<H>  4.3   |  21<L>  |  3.68<H>    Ca    7.5<L>      29 Apr 2022 06:25  Phos  4.7     04-29  Mg     1.9     04-29    TPro  4.8<L>  /  Alb  2.3<L>  /  TBili  0.4  /  DBili      /  AST  19  /  ALT  7<L>  /  AlkPhos  72  04-29    Creatinine Trend: 3.68 <--, 3.44 <--, 4.20 <--, 3.72 <--, 5.48 <--, 5.35 <--, 5.07 <--, 3.72 <--, 6.56 <--    Albumin, Serum: 2.3 g/dL (04-29 @ 06:25)  Phosphorus Level, Serum: 4.7 mg/dL (04-29 @ 06:25)  Albumin, Serum: 2.6 g/dL (04-28 @ 17:35)  Phosphorus Level, Serum: 5.1 mg/dL (04-28 @ 17:35)                              7.5    8.53  )-----------( 116      ( 29 Apr 2022 06:25 )             24.4     Urine Studies:      Iron 55, TIBC 191, %sat 29      [02-04-22 @ 00:04]  Ferritin 1287      [02-04-22 @ 00:04]  PTH -- (Ca 9.2)      [02-05-22 @ 10:13]   294  HbA1c 6.0      [02-21-20 @ 08:53]  Lipid: chol 118, TG 54, HDL 59, LDL --      [06-27-21 @ 09:44]    HBsAb 22656.2      [04-21-22 @ 14:13]  HBsAg Nonreact      [04-21-22 @ 14:13]  HBcAb Reactive      [04-21-22 @ 14:13]  HCV 0.25, Nonreact      [04-21-22 @ 14:13]  HIV Nonreact      [04-21-22 @ 14:47]      RADIOLOGY & ADDITIONAL STUDIES:

## 2022-04-29 NOTE — PROGRESS NOTE ADULT - PROBLEM SELECTOR PLAN 6
Pt. with generalized weakness in setting of severe symptomatic anemia. Hemoglobin was low in range of ~6 on admission on 4/28/22. US allograft showed collections c/w hematomas. Eliquis discontinue. 2 Units PRBC transfused on 4/28/22.   Will keep hemoglobin > 8 given hx of CAD. Transfuse 1 unit PRBC today with HD.   Monitor H/H.   Avoid AC.

## 2022-04-29 NOTE — PROGRESS NOTE ADULT - ASSESSMENT
66 yr old man with ESRD due to DM on HD since 2019 presenting for DDRT. Pt underwent DDRT on 4/21/22 with DGF and HD dependant presenting with increased lethargy and symptomatic severe anemia with hematomas around allograft

## 2022-04-29 NOTE — PROGRESS NOTE ADULT - SUBJECTIVE AND OBJECTIVE BOX
[facility] DIVISION OF KIDNEY DISEASES AND HYPERTENSION -- FOLLOW UP NOTE  --------------------------------------------------------------------------------  HPI: 66 yr old man with ESRD due to DM on HD since 2019 presenting for DDRT. Pt underwent DDRT on 4/21/22 from 58 yr old donor, KDPI 82%, DCD WIT 22 min, Terminal cr 1.34. HLA mismatch 1,2,2. CMV +/+. Pt post op had DGF and remained HD dependant. Pt was discharged home last night (4/27/22) after HD. However pt this AM was noted to have increased lethargy and inability to get out of bed necessitating hospital admission. Hemoglobin was noted to be low in range of 6 with allograft US showing hematomas around allograft. Eliquis was held and received 2 unit PRBC overnight on 4/28/22. H/H stable today AM. Scr continues to remain elevated at 3.68 with suboptimal urine output.     Pt. seen and examined, son at bedside. Pt says he feels weak. Denies fever, chills, nausea, vomiting, hematuria.     PAST HISTORY  --------------------------------------------------------------------------------  No significant changes to PMH, PSH, FHx, SHx, unless otherwise noted    ALLERGIES & MEDICATIONS  --------------------------------------------------------------------------------  Allergies    No Known Allergies    Intolerances      Standing Inpatient Medications  amLODIPine   Tablet 10 milliGRAM(s) Oral daily  atorvastatin 40 milliGRAM(s) Oral at bedtime  carvedilol 3.125 milliGRAM(s) Oral every 12 hours  dextrose 5%. 1000 milliLiter(s) IV Continuous <Continuous>  dextrose 5%. 1000 milliLiter(s) IV Continuous <Continuous>  dextrose 50% Injectable 25 Gram(s) IV Push once  dextrose 50% Injectable 12.5 Gram(s) IV Push once  dextrose 50% Injectable 25 Gram(s) IV Push once  diVALproex  milliGRAM(s) Oral <User Schedule>  diVALproex  milliGRAM(s) Oral <User Schedule>  furosemide    Tablet 80 milliGRAM(s) Oral two times a day  glucagon  Injectable 1 milliGRAM(s) IntraMuscular once  insulin glargine Injectable (LANTUS) 7 Unit(s) SubCutaneous at bedtime  insulin lispro (ADMELOG) corrective regimen sliding scale   SubCutaneous three times a day before meals  insulin lispro Injectable (ADMELOG) 7 Unit(s) SubCutaneous three times a day before meals  levETIRAcetam 250 milliGRAM(s) Oral every 12 hours  levothyroxine 50 MICROGram(s) Oral daily  magnesium sulfate  IVPB 1 Gram(s) IV Intermittent once  mycophenolate mofetil 1000 milliGRAM(s) Oral <User Schedule>  nystatin    Suspension [id number] Unit(s) Oral four times a day  pantoprazole    Tablet 40 milliGRAM(s) Oral before breakfast  predniSONE   Tablet 5 milliGRAM(s) Oral daily  sevelamer carbonate 800 milliGRAM(s) Oral every 8 hours  sodium bicarbonate 1300 milliGRAM(s) Oral every 12 hours  trimethoprim   80 mG/sulfamethoxazole 400 mG 1 Tablet(s) Oral daily  valGANciclovir 450 milliGRAM(s) Oral <User Schedule>    PRN Inpatient Medications  dextrose Oral Gel 15 Gram(s) Oral once PRN  senna 2 Tablet(s) Oral at bedtime PRN      REVIEW OF SYSTEMS  --------------------------------------------------------------------------------  Gen: malaise+  Skin: No rashes  Head/Eyes/Ears: Normal hearing,   Respiratory: No dyspnea, cough  CV: No chest pain, as per HPI  GI: No abdominal pain, diarrhea  : No dysuria, hematuria, as per HPI  MSK: trace LE edema  Heme: hematoma+, as per HPI    All other systems were reviewed and are negative, except as noted.    VITALS/PHYSICAL EXAM  --------------------------------------------------------------------------------  T(C): 36.9 (04-29-22 @ 13:02), Max: 36.9 (04-29-22 @ 13:02)  HR: 67 (04-29-22 @ 13:02) (61 - 69)  BP: 155/73 (04-29-22 @ 13:02) (114/64 - 158/76)  RR: 18 (04-29-22 @ 13:02) (18 - 20)  SpO2: 100% (04-29-22 @ 13:02) (96% - 100%)  Wt(kg): --  Height (cm): 177.8 (04-28-22 @ 14:20)  Weight (kg): 68.9 (04-28-22 @ 14:20)  BMI (kg/m2): 21.8 (04-28-22 @ 14:20)  BSA (m2): 1.86 (04-28-22 @ 14:20)      04-28-22 @ 07:01  -  04-29-22 @ 07:00  --------------------------------------------------------  IN: 1060 mL / OUT: 430 mL / NET: 630 mL    04-29-22 @ 07:01  -  04-29-22 @ 13:55  --------------------------------------------------------  IN: 0 mL / OUT: 400 mL / NET: -400 mL      Physical Exam:  	Gen: tired appearing male  	HEENT: Anicteric  	Pulm: CTA B/L  	CV: S1S2+  	Abd: Soft, +BS               Transplant site: RLQ non tender+, surgical site c/d/i  	Ext: trace  LLE>RLE edema B/L  	Neuro: Awake  	Skin: Warm and dry  	Vascular access: Left internal jugular tunnelled HD cath+    LABS/STUDIES  --------------------------------------------------------------------------------              7.5    8.53  >-----------<  116      [04-29-22 @ 06:25]              24.4     131  |  94  |  64  ----------------------------<  123      [04-29-22 @ 06:25]  4.3   |  21  |  3.68        Ca     7.5     [04-29-22 @ 06:25]      Mg     1.9     [04-29-22 @ 06:25]      Phos  4.7     [04-29-22 @ 06:25]    Creatinine Trend:  SCr 3.68 [04-29 @ 06:25]  SCr 3.44 [04-28 @ 17:35]  SCr 4.20 [04-27 @ 06:49]  SCr 3.72 [04-26 @ 06:46]  SCr 5.48 [04-25 @ 06:17]      HBsAb 42852.2      [04-21-22 @ 14:13]  HBsAg Nonreact      [04-21-22 @ 14:13]  HBcAb Reactive      [04-21-22 @ 14:13]  HCV 0.25, Nonreact      [04-21-22 @ 14:13]  HIV Nonreact      [04-21-22 @ 14:47]    TacrolimusTacrolimus (), Serum: 7.2 ng/mL (04-29 @ 07:50)  Tacrolimus (), Serum: 8.7 ng/mL (04-27 @ 07:17)  Tacrolimus (), Serum: 10.3 ng/mL (04-26 @ 07:36)

## 2022-04-29 NOTE — PROGRESS NOTE ADULT - SUBJECTIVE AND OBJECTIVE BOX
Transplant Surgery - Multidisciplinary Rounds  --------------------------------------------------------------  DDRT Date: 4/21/22    Present: Patient seen and examined with multidisciplinary team including Transplant Surgeon: Dr. Mann, Nephrologist: Dr. Alfred, WALDO Gamino and unit RN during am rounds.  Disciplines not in attendance will be notified of the plan.    HPI: 66M with history of HTN, HLD, DM (on insulin), ESRD (2/2 DM and HTN) on HD via permacath since 3yrs ago MWF, afib (on eliquis 2.5mg BID), hx of stroke (2019 2/2 afib), focal seizure (on depakote, keppra), hx of CABG (2020), hx of nonbleeding gastric/duodenal ulcer (EGD 2/9/22, on protonix)    s/p  DCD DDRT ( 1A/1V/1U stented) on 4/21/2022 with Thymoglobulin Induction   - Donor: Donor: 58 y/o M, KDPI 82%, PHS high risk, terminal Cr 1.34, CMV pos, EBV pos, HCV OPAL neg)   - Graft: post op renal doppler with  good perfusion; course c/b delayed graft function requiring HD inpatient and on discharge.  - A fib: resumed Eliquis; Plavix not restarted yet    He was discharged home yesterday w/ outpatient HD (last HD yesterday before discharge) now directly admitted after family called stating patient felt weak and family was not able take care of patient at home. Per son, it was very diffcult for the patient to climb the 6 stairs required to go inside the house. Son states that patient has had increased weakness for past few weeks since before the transplant and family had planned to get him into physical therapy; however he was offered the kidney transplant therefore the postponed the PT plans.     Patient currently AOX3, NAD. Sensory/motor intact. Denies fevers/chills.    Interval Events:  -Afebrile, stable vitals.   -Renal US yesterday w/ new hematomas "heterogenous avascular areas asjacent to the upper pole (measuring 6.1 x 6.2 x 5.5cm) and lower pole (measuring 8.2 x 2.5 x 5.2 cm) w/ extension posterior to the kidney.   -H/H on admission was 6.4/21.2, was given 1U pRBC. Repeat H/H 7.9/24.9. H/H this AM 7.5/24.4.   -Patient reports feeling better this AM.   -Cr 3.68 <-----3.44, last HD 4/27  -UOP ~400cc    Immunosuppression:  -Thymo induction  -Env per level  -MMF 1g BID  -Pred 5  -ongoing monitoring for signs of infection.     Potential Discharge date: pending clinical improvement.     Education:  Medications    Plan of care:  See Below    MEDICATIONS  (STANDING):  amLODIPine   Tablet 10 milliGRAM(s) Oral daily  atorvastatin 40 milliGRAM(s) Oral at bedtime  carvedilol 3.125 milliGRAM(s) Oral every 12 hours  dextrose 5%. 1000 milliLiter(s) (50 mL/Hr) IV Continuous <Continuous>  dextrose 5%. 1000 milliLiter(s) (100 mL/Hr) IV Continuous <Continuous>  dextrose 50% Injectable 25 Gram(s) IV Push once  dextrose 50% Injectable 12.5 Gram(s) IV Push once  dextrose 50% Injectable 25 Gram(s) IV Push once  diVALproex  milliGRAM(s) Oral <User Schedule>  diVALproex  milliGRAM(s) Oral <User Schedule>  furosemide    Tablet 80 milliGRAM(s) Oral two times a day  glucagon  Injectable 1 milliGRAM(s) IntraMuscular once  insulin glargine Injectable (LANTUS) 7 Unit(s) SubCutaneous at bedtime  insulin lispro (ADMELOG) corrective regimen sliding scale   SubCutaneous three times a day before meals  insulin lispro Injectable (ADMELOG) 7 Unit(s) SubCutaneous three times a day before meals  levETIRAcetam 250 milliGRAM(s) Oral every 12 hours  levothyroxine 50 MICROGram(s) Oral daily  magnesium sulfate  IVPB 1 Gram(s) IV Intermittent once  mycophenolate mofetil 1000 milliGRAM(s) Oral <User Schedule>  nystatin    Suspension [id number] Unit(s) Oral four times a day  pantoprazole    Tablet 40 milliGRAM(s) Oral before breakfast  predniSONE   Tablet 5 milliGRAM(s) Oral daily  sevelamer carbonate 800 milliGRAM(s) Oral every 8 hours  sodium bicarbonate 1300 milliGRAM(s) Oral every 12 hours  tacrolimus ER Tablet (ENVARSUS XR) 1 milliGRAM(s) Oral once  trimethoprim   80 mG/sulfamethoxazole 400 mG 1 Tablet(s) Oral daily  valGANciclovir 450 milliGRAM(s) Oral <User Schedule>    MEDICATIONS  (PRN):  dextrose Oral Gel 15 Gram(s) Oral once PRN Blood Glucose LESS THAN 70 milliGRAM(s)/deciliter  senna 2 Tablet(s) Oral at bedtime PRN Constipation      PAST MEDICAL & SURGICAL HISTORY:  Hypertension    Diabetes    Dyslipidemia    CAD (Coronary Artery Disease)  with Stents in 06/2009 and 6/2019, s/p off-pump C3L on 8/13/20    Hypothyroidism    CVA (cerebral vascular accident)  12/13/19 with residual bilateral weakness    Anemia    PEG (percutaneous endoscopic gastrostomy) status  removed July 2020    Intubation of airway performed without difficulty  Dec 2019  CVA c/b status epilepticus requiring intubation and PEG in 12/2019-    ESRD on dialysis  M/W/F    Seizures  after CVA, last seizure was last week 4/07/22    History of insertion of stent into coronary artery bypass graft  2009 and 2019    S/P CABG x 3  off pump C3L on 8/13/20        Vital Signs Last 24 Hrs  T(C): 36.7 (29 Apr 2022 09:15), Max: 36.7 (29 Apr 2022 09:15)  T(F): 98 (29 Apr 2022 09:15), Max: 98 (29 Apr 2022 09:15)  HR: 64 (29 Apr 2022 09:15) (61 - 69)  BP: 158/76 (29 Apr 2022 09:15) (114/64 - 158/76)  BP(mean): --  RR: 20 (29 Apr 2022 09:15) (18 - 20)  SpO2: 100% (29 Apr 2022 09:15) (96% - 100%)    I&O's Summary    28 Apr 2022 07:01  -  29 Apr 2022 07:00  --------------------------------------------------------  IN: 1060 mL / OUT: 430 mL / NET: 630 mL    29 Apr 2022 07:01  -  29 Apr 2022 11:56  --------------------------------------------------------  IN: 0 mL / OUT: 200 mL / NET: -200 mL                              7.5    8.53  )-----------( 116      ( 29 Apr 2022 06:25 )             24.4     04-29    131<L>  |  94<L>  |  64<H>  ----------------------------<  123<H>  4.3   |  21<L>  |  3.68<H>    Ca    7.5<L>      29 Apr 2022 06:25  Phos  4.7     04-29  Mg     1.9     04-29    TPro  4.8<L>  /  Alb  2.3<L>  /  TBili  0.4  /  DBili  x   /  AST  19  /  ALT  7<L>  /  AlkPhos  72  04-29    Tacrolimus (), Serum: 7.2 ng/mL (04-29 @ 07:50)          Review of Systems: Gen: No weight changes, fatigue, fevers/chills, +weakness  Skin: No rashes  Head/Eyes/Ears/Mouth: No headache; Normal hearing; Normal vision w/o blurriness; No sinus pain/discomfort, sore throat  Respiratory: No dyspnea, cough, wheezing, hemoptysis  CV: No chest pain, PND, orthopnea  GI: No abdominal pain, diarrhea, constipation, nausea, vomiting, melena, hematochezia  : No increased frequency, dysuria, hematuria, nocturia  MSK: No joint pain/swelling; no back pain; no edema  Neuro: No dizziness/lightheadedness, weakness, seizures, numbness, tingling  Heme: No easy bruising or bleeding  Endo: No heat/cold intolerance  Psych: No significant nervousness, anxiety, stress, depression  All other systems were reviewed and are negative, except as noted    Physical Exam: Constitutional: Well developed / well nourished  Eyes: Anicteric, PERRLA  ENMT: nc/at  Neck: Supple  Respiratory: CTA B/L  Cardiovascular: RRR  Gastrointestinal: Soft abdomen, NT, ND. Incision c/d/i  Genitourinary:  Voiding spontaneously  Extremities: SCD's in place and working bilaterally  Vascular: Palpable dp pulses bilaterally  Neurological: A&O x3  Musculoskeletal: Moving all extremities  Psychiatric: Responsive

## 2022-04-30 NOTE — PROVIDER CONTACT NOTE (HYPOGLYCEMIA EVENT) - NS PROVIDER CONTACT NOTE-TREATMENT-HYPO
apple juice 4oz with 1 packet of sugar, dinner- cooked salmon with pasta and string beans, 6 grapes, whole salad bowl @ 4/29/22 21:10  FS retaken @21:29, result:104, pt due for lantus 7U, as per NP Yina guzman to give with current FS/4 oz Fruit Juice (Specify quantity, date/time) apple juice 4oz with 1 packet of sugar, dinner- cooked salmon with pasta and string beans, 6 grapes, whole salad bowl @ 4/29/22 21:10  As per NP Yina, hold pre meal insulin   FS retaken @21:29, result:104, pt due for lantus 7U, as per NP Yina ok to give with current FS/4 oz Fruit Juice (Specify quantity, date/time)

## 2022-04-30 NOTE — PROGRESS NOTE ADULT - SUBJECTIVE AND OBJECTIVE BOX
[facility] DIVISION OF KIDNEY DISEASES AND HYPERTENSION -- FOLLOW UP NOTE  --------------------------------------------------------------------------------  Chief Complaint:    24 hour events/subjective:  Patient was seen and examined at bedside    PAST HISTORY  --------------------------------------------------------------------------------  No significant changes to PMH, PSH, FHx, SHx, unless otherwise noted    ALLERGIES & MEDICATIONS  --------------------------------------------------------------------------------  Allergies    No Known Allergies    Intolerances      Standing Inpatient Medications  amLODIPine   Tablet 10 milliGRAM(s) Oral daily  atorvastatin 40 milliGRAM(s) Oral at bedtime  carvedilol 3.125 milliGRAM(s) Oral every 12 hours  chlorhexidine 2% Cloths 1 Application(s) Topical daily  dextrose 5%. 1000 milliLiter(s) IV Continuous <Continuous>  dextrose 5%. 1000 milliLiter(s) IV Continuous <Continuous>  dextrose 50% Injectable 25 Gram(s) IV Push once  dextrose 50% Injectable 12.5 Gram(s) IV Push once  dextrose 50% Injectable 25 Gram(s) IV Push once  diVALproex  milliGRAM(s) Oral <User Schedule>  diVALproex  milliGRAM(s) Oral <User Schedule>  furosemide    Tablet 80 milliGRAM(s) Oral two times a day  glucagon  Injectable 1 milliGRAM(s) IntraMuscular once  insulin glargine Injectable (LANTUS) 7 Unit(s) SubCutaneous at bedtime  insulin lispro (ADMELOG) corrective regimen sliding scale   SubCutaneous three times a day before meals  insulin lispro Injectable (ADMELOG) 7 Unit(s) SubCutaneous three times a day before meals  levETIRAcetam 250 milliGRAM(s) Oral every 12 hours  levothyroxine 50 MICROGram(s) Oral daily  mycophenolate mofetil 1000 milliGRAM(s) Oral <User Schedule>  nystatin    Suspension [id number] Unit(s) Oral four times a day  pantoprazole    Tablet 40 milliGRAM(s) Oral before breakfast  predniSONE   Tablet 5 milliGRAM(s) Oral daily  sevelamer carbonate 800 milliGRAM(s) Oral every 8 hours  sodium bicarbonate 1300 milliGRAM(s) Oral every 12 hours  tacrolimus ER Tablet (ENVARSUS XR) 3 milliGRAM(s) Oral <User Schedule>  trimethoprim   80 mG/sulfamethoxazole 400 mG 1 Tablet(s) Oral daily  valGANciclovir 450 milliGRAM(s) Oral <User Schedule>    PRN Inpatient Medications  dextrose Oral Gel 15 Gram(s) Oral once PRN  senna 2 Tablet(s) Oral at bedtime PRN      REVIEW OF SYSTEMS  --------------------------------------------------------------------------------  Gen: No fatigue, fevers/chills, weakness  Skin: No rashes  Head/Eyes/Ears/Mouth: No headache;No sore throat  Respiratory: No dyspnea, cough,   CV: No chest pain, PND, orthopnea  GI: No abdominal pain, diarrhea, constipation, nausea, vomiting  Transplant: No pain  : No increased frequency, dysuria, hematuria, nocturia  MSK: No joint pain/swelling; no back pain; no edema  Neuro: No dizziness/lightheadedness, weakness, seizures, numbness, tingling  Psych: No significant nervousness, anxiety, stress, depression    All other systems were reviewed and are negative, except as noted.    VITALS/PHYSICAL EXAM  --------------------------------------------------------------------------------  T(C): 36.7 (04-30-22 @ 09:01), Max: 36.9 (04-29-22 @ 13:02)  HR: 71 (04-30-22 @ 09:01) (64 - 77)  BP: 171/71 (04-30-22 @ 09:01) (138/54 - 176/71)  RR: 18 (04-30-22 @ 09:01) (18 - 18)  SpO2: 98% (04-30-22 @ 09:01) (98% - 100%)  Wt(kg): --  Height (cm): 177.8 (04-28-22 @ 14:20)  Weight (kg): 68.9 (04-28-22 @ 14:20)  BMI (kg/m2): 21.8 (04-28-22 @ 14:20)  BSA (m2): 1.86 (04-28-22 @ 14:20)      04-29-22 @ 07:01  -  04-30-22 @ 07:00  --------------------------------------------------------  IN: 1920 mL / OUT: 1200 mL / NET: 720 mL    04-30-22 @ 07:01  -  04-30-22 @ 10:03  --------------------------------------------------------  IN: 0 mL / OUT: 250 mL / NET: -250 mL      Physical Exam:  	Gen: NAD  	HEENT: PERRL, supple neck, clear oropharynx  	Pulm: CTA B/L  	CV: RRR, S1S2; no rub  	Back: No spinal or CVA tenderness; no sacral edema  	Abd: +BS, soft, nontender/nondistended                      Transplant: No tenderness, swelling  	: No suprapubic tenderness  	UE: Warm, FROM; no edema; no asterixis  	LE: Warm, FROM; no edema  	Neuro: No focal deficits  	Psych: Normal affect and mood  	Skin: Warm, without rashes      LABS/STUDIES  --------------------------------------------------------------------------------              8.0    6.85  >-----------<  133      [04-30-22 @ 07:17]              24.2     134  |  96  |  35  ----------------------------<  74      [04-30-22 @ 07:15]  3.7   |  26  |  2.52        Ca     7.8     [04-30-22 @ 07:15]      Mg     2.1     [04-30-22 @ 07:15]      Phos  3.0     [04-30-22 @ 07:15]    TPro  5.3  /  Alb  2.5  /  TBili  0.4  /  DBili  x   /  AST  18  /  ALT  7   /  AlkPhos  71  [04-30-22 @ 07:15]              [04-30-22 @ 07:15]    Creatinine Trend:  SCr 2.52 [04-30 @ 07:15]  SCr 3.68 [04-29 @ 06:25]  SCr 3.44 [04-28 @ 17:35]  SCr 4.20 [04-27 @ 06:49]  SCr 3.72 [04-26 @ 06:46]    Tacrolimus (), Serum: 7.2 ng/mL (04-29 @ 07:50)  Tacrolimus (), Serum: 8.7 ng/mL (04-27 @ 07:17)  Tacrolimus (), Serum: 10.3 ng/mL (04-26 @ 07:36)  Tacrolimus (), Serum: 20.8 ng/mL (04-25 @ 08:34)                Iron 55, TIBC 191, %sat 29      [02-04-22 @ 00:04]  Ferritin 1287      [02-04-22 @ 00:04]  PTH -- (Ca 9.2)      [02-05-22 @ 10:13]   294  HbA1c 6.0      [02-21-20 @ 08:53]  Lipid: chol 118, TG 54, HDL 59, LDL --      [06-27-21 @ 09:44]    HBsAb 96835.2      [04-21-22 @ 14:13]  HBsAg Nonreact      [04-21-22 @ 14:13]  HBcAb Reactive      [04-21-22 @ 14:13]  HCV 0.25, Nonreact      [04-21-22 @ 14:13]  HIV Nonreact      [04-21-22 @ 14:47]

## 2022-04-30 NOTE — PROGRESS NOTE ADULT - ASSESSMENT
66M with history of HTN, HLD, DM (on insulin), ESRD (2/2 DM and HTN) on HD via permacath since 3yrs ago MWF, afib (on eliquis 2.5mg BID), hx of stroke (2019 2/2 afib), focal seizure (on depakote, keppra), hx of CABG (2020), hx of nonbleeding gastric/duodenal ulcer (EGD 2/9/22, on protonix), s/p DDRT, came back due to weakness    s/p  DCD KTX ( 1A/1V/1U stented) on 4/21/2022 with Thymoglobulin Induction   - Donor: Donor: 56 y/o M, KDPI 82%, PHS high risk, terminal Cr 1.34, CMV pos, EBV pos, HCV OPAL neg)   - Graft: post op renal doppler with  good perfusion; course c/b delayed graft function requiring HD    - A fib: resumed Eliquis; Plavix not restarted yet  - Immunosuppressant per transplant team  - Remains HD dependent, HD as needed. His regular schedule as outpatient is MWF  - planned for HD today   - Monitor urine output  -s/w to plan for rehab in [facility]     HTN:  -acceptable   -Monitor BP    Anemia:  -s/p PRBC today  monitor Hgb     MBD:  Calcium corrected is normal  monitor Po4, Ca

## 2022-04-30 NOTE — PROGRESS NOTE ADULT - SUBJECTIVE AND OBJECTIVE BOX
[facility]   MD NINA MASON MD, PA  SADIE SKYE MUNOZ    TEL:  OFFICE: [phone]    From 5pm-7am Answering Service [phone]    -- RENAL FOLLOW UP NOTE ---Date of Service 04-30-22 @ 15:08    Patient is a 66y old  Male who presents with a chief complaint of Weakness (29 Apr 2022 13:53)      Patient seen and examined at bedside.     No pain, no shortness of breath    Review of systems: All 10 points ROS was obtained except as above.     amLODIPine   Tablet 10 milliGRAM(s) Oral daily  atorvastatin 40 milliGRAM(s) Oral at bedtime  carvedilol 3.125 milliGRAM(s) Oral every 12 hours  chlorhexidine 2% Cloths 1 Application(s) Topical daily  dextrose 5%. 1000 milliLiter(s) IV Continuous <Continuous>  dextrose 5%. 1000 milliLiter(s) IV Continuous <Continuous>  dextrose 50% Injectable 25 Gram(s) IV Push once  dextrose 50% Injectable 12.5 Gram(s) IV Push once  dextrose 50% Injectable 25 Gram(s) IV Push once  dextrose Oral Gel 15 Gram(s) Oral once PRN  diVALproex  milliGRAM(s) Oral <User Schedule>  diVALproex  milliGRAM(s) Oral <User Schedule>  furosemide    Tablet 80 milliGRAM(s) Oral two times a day  glucagon  Injectable 1 milliGRAM(s) IntraMuscular once  insulin glargine Injectable (LANTUS) 7 Unit(s) SubCutaneous at bedtime  insulin lispro (ADMELOG) corrective regimen sliding scale   SubCutaneous three times a day before meals  insulin lispro Injectable (ADMELOG) 7 Unit(s) SubCutaneous three times a day before meals  levETIRAcetam 250 milliGRAM(s) Oral every 12 hours  levothyroxine 50 MICROGram(s) Oral daily  mycophenolate mofetil 1000 milliGRAM(s) Oral <User Schedule>  nystatin    Suspension 251772 Unit(s) Oral four times a day  pantoprazole    Tablet 40 milliGRAM(s) Oral before breakfast  predniSONE   Tablet 5 milliGRAM(s) Oral daily  senna 2 Tablet(s) Oral at bedtime PRN  sevelamer carbonate 800 milliGRAM(s) Oral every 8 hours  sodium bicarbonate 1300 milliGRAM(s) Oral every 12 hours  tacrolimus ER Tablet (ENVARSUS XR) 3 milliGRAM(s) Oral <User Schedule>  trimethoprim   80 mG/sulfamethoxazole 400 mG 1 Tablet(s) Oral daily  valGANciclovir 450 milliGRAM(s) Oral <User Schedule>      VITAL:  T(C): , Max: 36.8 (04-30-22 @ 01:00)  T(F): , Max: 98.3 (04-30-22 @ 01:00)  HR: 68 (04-30-22 @ 17:00)  BP: 145/70 (04-30-22 @ 17:00)  BP(mean): --  RR: 18 (04-30-22 @ 17:00)  SpO2: 98% (04-30-22 @ 17:00)  Wt(kg): --    04-29-22 @ 07:01  -  04-30-22 @ 07:00  --------------------------------------------------------  IN: 1920 mL / OUT: 1200 mL / NET: 720 mL    04-30-22 @ 07:01  -  04-30-22 @ 21:30  --------------------------------------------------------  IN: 480 mL / OUT: 600 mL / NET: -120 mL        PHYSICAL EXAM:  General: NAD; Alert  HEENT:  NCAT; PERRLA  Neck: No JVD; supple  Respiratory: CTA-b/l  Cardiac: RRR s1s2  Gastrointestinal: BS+, soft, NT/ND  Urologic: No tucker  Extremities: No peripheral edema      LABS:                          8.0    6.85  )-----------( 133      ( 30 Apr 2022 07:17 )             24.2     Na(134)/K(3.7)/Cl(96)/HCO3(26)/BUN(35)/Cr(2.52)Glu(74)/Ca(7.8)/Mg(2.1)/PO4(3.0)    04-30 @ 07:15  Na(131)/K(4.3)/Cl(94)/HCO3(21)/BUN(64)/Cr(3.68)Glu(123)/Ca(7.5)/Mg(1.9)/PO4(4.7)    04-29 @ 06:25  Na(132)/K(5.1)/Cl(94)/HCO3(22)/BUN(54)/Cr(3.44)Glu(244)/Ca(7.7)/Mg(2.0)/PO4(5.1)    04-28 @ 17:35    Tacrolimus (), Serum: 7.4 ng/mL (04-30 @ 07:49)    LABS:  04-30    134<L>  |  96  |  35<H>  ----------------------------<  74  3.7   |  26  |  2.52<H>    Ca    7.8<L>      30 Apr 2022 07:15  Phos  3.0     04-30  Mg     2.1     04-30    TPro  5.3<L>  /  Alb  2.5<L>  /  TBili  0.4  /  DBili      /  AST  18  /  ALT  7<L>  /  AlkPhos  71  04-30    Creatinine Trend: 2.52 <--, 3.68 <--, 3.44 <--, 4.20 <--, 3.72 <--, 5.48 <--, 5.35 <--, 5.07 <--    Albumin, Serum: 2.5 g/dL (04-30 @ 07:15)  Phosphorus Level, Serum: 3.0 mg/dL (04-30 @ 07:15)                              8.0    6.85  )-----------( 133      ( 30 Apr 2022 07:17 )             24.2     Urine Studies:      Iron 55, TIBC 191, %sat 29      [02-04-22 @ 00:04]  Ferritin 1287      [02-04-22 @ 00:04]  PTH -- (Ca 9.2)      [02-05-22 @ 10:13]   294  HbA1c 6.0      [02-21-20 @ 08:53]  Lipid: chol 118, TG 54, HDL 59, LDL --      [06-27-21 @ 09:44]    HBsAb 32632.2      [04-21-22 @ 14:13]  HBsAg Nonreact      [04-21-22 @ 14:13]  HBcAb Reactive      [04-21-22 @ 14:13]  HCV 0.25, Nonreact      [04-21-22 @ 14:13]  HIV Nonreact      [04-21-22 @ 14:47]      RADIOLOGY & ADDITIONAL STUDIES:                HBsAb 13802.2      [04-21-22 @ 14:13]  HBsAg Nonreact      [04-21-22 @ 14:13]  HBcAb Reactive      [04-21-22 @ 14:13]  HCV 0.25, Nonreact      [04-21-22 @ 14:13]  HIV Nonreact      [04-21-22 @ 14:47]      RADIOLOGY & ADDITIONAL STUDIES:

## 2022-04-30 NOTE — PROGRESS NOTE ADULT - SUBJECTIVE AND OBJECTIVE BOX
Transplant Surgery - Multidisciplinary Rounds  --------------------------------------------------------------  DDRT Date: 4/21/22    Present: Patient seen and examined with multidisciplinary team including Transplant Surgeon: Dr. Mann, Nephrologist: Dr. AKILAH Lomeli and Altagracia Montelongo  and unit RN during am rounds.  Disciplines not in attendance will be notified of the plan.    HPI: 66M with history of HTN, HLD, DM (on insulin), ESRD (2/2 DM and HTN) on HD via permacath since 3yrs ago MWF, afib (on eliquis 2.5mg BID), hx of stroke (2019 2/2 afib), focal seizure (on depakote, keppra), hx of CABG (2020), hx of nonbleeding gastric/duodenal ulcer (EGD 2/9/22, on protonix)    s/p  DCD DDRT ( 1A/1V/1U stented) on 4/21/2022 with Thymoglobulin Induction   - Donor: Donor: 56 y/o M, KDPI 82%, PHS high risk, terminal Cr 1.34, CMV pos, EBV pos, HCV OPAL neg)   - Graft: post op renal doppler with  good perfusion; course c/b delayed graft function requiring HD inpatient and on discharge.  - A fib: resumed Eliquis; Plavix not restarted yet    He was discharged home yesterday w/ outpatient HD (last HD yesterday before discharge) now directly admitted after family called stating patient felt weak and family was not able take care of patient at home. Per son, it was very diffcult for the patient to climb the 6 stairs required to go inside the house. Son states that patient has had increased weakness for past few weeks since before the transplant and family had planned to get him into physical therapy; however he was offered the kidney transplant therefore the postponed the PT plans.     Patient currently AOX3, NAD. Sensory/motor intact. Denies fevers/chills.    Interval Events:  -Afebrile, stable vitals.   - s/p 1 U prbc for dec H/H w/ response  - HD yesterday   -UOP improving 1.2 L from 400cc    Immunosuppression:  -Induction: Thymo induction  -Immuno: Env per level, MMF 1g BID, Pred 5  -ongoing monitoring for signs of infection.     Potential Discharge date: pending clinical improvement.     Education:  Medications    Plan of care:  See Below        MEDICATIONS  (STANDING):  amLODIPine   Tablet 10 milliGRAM(s) Oral daily  atorvastatin 40 milliGRAM(s) Oral at bedtime  carvedilol 3.125 milliGRAM(s) Oral every 12 hours  chlorhexidine 2% Cloths 1 Application(s) Topical daily  dextrose 5%. 1000 milliLiter(s) (100 mL/Hr) IV Continuous <Continuous>  dextrose 5%. 1000 milliLiter(s) (50 mL/Hr) IV Continuous <Continuous>  dextrose 50% Injectable 25 Gram(s) IV Push once  dextrose 50% Injectable 12.5 Gram(s) IV Push once  dextrose 50% Injectable 25 Gram(s) IV Push once  diVALproex  milliGRAM(s) Oral <User Schedule>  diVALproex  milliGRAM(s) Oral <User Schedule>  furosemide    Tablet 80 milliGRAM(s) Oral two times a day  glucagon  Injectable 1 milliGRAM(s) IntraMuscular once  insulin glargine Injectable (LANTUS) 7 Unit(s) SubCutaneous at bedtime  insulin lispro (ADMELOG) corrective regimen sliding scale   SubCutaneous three times a day before meals  insulin lispro Injectable (ADMELOG) 7 Unit(s) SubCutaneous three times a day before meals  levETIRAcetam 250 milliGRAM(s) Oral every 12 hours  levothyroxine 50 MICROGram(s) Oral daily  mycophenolate mofetil 1000 milliGRAM(s) Oral <User Schedule>  nystatin    Suspension 021181 Unit(s) Oral four times a day  pantoprazole    Tablet 40 milliGRAM(s) Oral before breakfast  predniSONE   Tablet 5 milliGRAM(s) Oral daily  sevelamer carbonate 800 milliGRAM(s) Oral every 8 hours  sodium bicarbonate 1300 milliGRAM(s) Oral every 12 hours  tacrolimus ER Tablet (ENVARSUS XR) 3 milliGRAM(s) Oral <User Schedule>  trimethoprim   80 mG/sulfamethoxazole 400 mG 1 Tablet(s) Oral daily  valGANciclovir 450 milliGRAM(s) Oral <User Schedule>    MEDICATIONS  (PRN):  dextrose Oral Gel 15 Gram(s) Oral once PRN Blood Glucose LESS THAN 70 milliGRAM(s)/deciliter  senna 2 Tablet(s) Oral at bedtime PRN Constipation      PAST MEDICAL & SURGICAL HISTORY:  Hypertension    Diabetes    Dyslipidemia    CAD (Coronary Artery Disease)  with Stents in 06/2009 and 6/2019, s/p off-pump C3L on 8/13/20    Hypothyroidism    CVA (cerebral vascular accident)  12/13/19 with residual bilateral weakness    Anemia    PEG (percutaneous endoscopic gastrostomy) status  removed July 2020    Intubation of airway performed without difficulty  Dec 2019  CVA c/b status epilepticus requiring intubation and PEG in 12/2019-    ESRD on dialysis  M/W/F    Seizures  after CVA, last seizure was last week 4/07/22    History of insertion of stent into coronary artery bypass graft  2009 and 2019    S/P CABG x 3  off pump C3L on 8/13/20        Vital Signs Last 24 Hrs  T(C): 36.7 (30 Apr 2022 12:29), Max: 36.8 (30 Apr 2022 01:00)  T(F): 98 (30 Apr 2022 12:29), Max: 98.3 (30 Apr 2022 01:00)  HR: 67 (30 Apr 2022 12:29) (61 - 77)  BP: 153/61 (30 Apr 2022 12:29) (118/58 - 176/71)  BP(mean): --  RR: 20 (30 Apr 2022 12:29) (16 - 20)  SpO2: 100% (30 Apr 2022 12:29) (98% - 100%)    I&O's Summary    29 Apr 2022 07:01  -  30 Apr 2022 07:00  --------------------------------------------------------  IN: 1920 mL / OUT: 1200 mL / NET: 720 mL    30 Apr 2022 07:01  -  30 Apr 2022 14:24  --------------------------------------------------------  IN: 0 mL / OUT: 250 mL / NET: -250 mL          LABS:                        8.0    6.85  )-----------( 133      ( 30 Apr 2022 07:17 )             24.2     04-30    134<L>  |  96  |  35<H>  ----------------------------<  74  3.7   |  26  |  2.52<H>    Ca    7.8<L>      30 Apr 2022 07:15  Phos  3.0     04-30  Mg     2.1     04-30    TPro  5.3<L>  /  Alb  2.5<L>  /  TBili  0.4  /  DBili  x   /  AST  18  /  ALT  7<L>  /  AlkPhos  71  04-30        CAPILLARY BLOOD GLUCOSE      POCT Blood Glucose.: 148 mg/dL (30 Apr 2022 12:42)  POCT Blood Glucose.: 112 mg/dL (30 Apr 2022 09:10)  POCT Blood Glucose.: 113 mg/dL (30 Apr 2022 01:24)  POCT Blood Glucose.: 104 mg/dL (29 Apr 2022 21:29)  POCT Blood Glucose.: 70 mg/dL (29 Apr 2022 21:09)  POCT Blood Glucose.: 67 mg/dL (29 Apr 2022 21:08)  POCT Blood Glucose.: 80 mg/dL (29 Apr 2022 20:42)    LIVER FUNCTIONS - ( 30 Apr 2022 07:15 )  Alb: 2.5 g/dL / Pro: 5.3 g/dL / ALK PHOS: 71 U/L / ALT: 7 U/L / AST: 18 U/L / GGT: x             Tacrolimus (), Serum: 7.4 ng/mL (04-30 @ 07:49)      Cultures:        Review of Systems: Gen: No weight changes, fatigue, fevers/chills, +weakness  Skin: No rashes  Head/Eyes/Ears/Mouth: No headache; Normal hearing; Normal vision w/o blurriness; No sinus pain/discomfort, sore throat  Respiratory: No dyspnea, cough, wheezing, hemoptysis  CV: No chest pain, PND, orthopnea  GI: No abdominal pain, diarrhea, constipation, nausea, vomiting, melena, hematochezia  : No increased frequency, dysuria, hematuria, nocturia  MSK: No joint pain/swelling; no back pain; no edema  Neuro: No dizziness/lightheadedness, weakness, seizures, numbness, tingling  Heme: No easy bruising or bleeding  Endo: No heat/cold intolerance  Psych: No significant nervousness, anxiety, stress, depression  All other systems were reviewed and are negative, except as noted    Physical Exam: Constitutional: Well developed / well nourished  Eyes: Anicteric, PERRLA  ENMT: nc/at  Neck: Supple  Respiratory: CTA B/L  Cardiovascular: RRR  Gastrointestinal: Soft abdomen, NT, ND. Incision c/d/i  Genitourinary:  Voiding spontaneously  Extremities: SCD's in place and working bilaterally  Vascular: Palpable dp pulses bilaterally  Neurological: A&O x3  Musculoskeletal: Moving all extremities  Psychiatric: Responsive

## 2022-04-30 NOTE — PROGRESS NOTE ADULT - ASSESSMENT
66 yr old man with ESRD due to DM on HD since 2019 presenting for DDRT. Pt underwent DDRT on 4/21/22 with DGF and HD dependant presenting with increased lethargy and symptomatic severe anemia with hematomas around allograft. Primary Nephrologist Dr. Rey/Iliana.     1. s/p DDRT on 4/21/22 , Allograft function with DGF and HD dependant. Last HD done on 4/29/22.  On Lasix 80 mg PO BID.   2.Immunosuppression- s/p Thymo induction. On Envarsus adjust as per levels. target goal 8-10, Continue Cellcept 1 gm BID and Prednisone 5 mg po daily . Infection ppx with  Nystatin, bactrim, Valcyte.  3. Seizures- Continue Depakote and Keppra.  4.Hypertension-  Continue Amlodipine and increase Coreg 6.25 mg po bid   5.DM - On insulin. Continue and adjust as per BS levels.  6.Anemia due to acute blood loss, generalized weakness in setting of severe symptomatic anemia on admission. Hemoglobin was low in range of ~6 on admission on 4/28/22. US allograft showed collections c/w hematomas. Eliquis discontinued . 2 Units PRBC transfused on 4/28/22. and 1 unit of PRBC on 4/29/ with HD . Will keep hemoglobin > 8 given hx of CAD. Monitor H/H. Avoid AC.

## 2022-04-30 NOTE — PROVIDER CONTACT NOTE (HYPOGLYCEMIA EVENT) - NS PROVIDER CONTACT BACKGROUND-HYPO
Age: 66y    Gender: Male    POCT Blood Glucose:110 (04-29-22 @ 09:00)    113 mg/dL (04-30-22 @ 01:24)  104 mg/dL (04-29-22 @ 21:29)  70 mg/dL (04-29-22 @ 21:09)  67 mg/dL (04-29-22 @ 21:08)  80 mg/dL (04-29-22 @ 20:42)  109 mg/dL (04-29-22 @ 13:38)  110 mg/dL (04-29-22 @ 08:57)      eMAR:atorvastatin   40 milliGRAM(s) Oral (04-29-22 @ 21:04)    insulin glargine Injectable (LANTUS)   7 Unit(s) SubCutaneous (04-29-22 @ 21:37)    insulin lispro Injectable (ADMELOG)   7 Unit(s) SubCutaneous (04-29-22 @ 13:42)   7 Unit(s) SubCutaneous (04-29-22 @ 09:32)    levothyroxine   50 MICROGram(s) Oral (04-29-22 @ 06:00)    predniSONE   Tablet   5 milliGRAM(s) Oral (04-29-22 @ 06:00)

## 2022-04-30 NOTE — PROGRESS NOTE ADULT - ASSESSMENT
66M with history of HTN, HLD, DM (on insulin), ESRD (2/2 DM and HTN) on HD via permacath since 3yrs ago MWF, afib (on eliquis 2.5mg BID), hx of stroke (2019 2/2 afib), focal seizure (on depakote, keppra), hx of CABG (2020), hx of nonbleeding gastric/duodenal ulcer (EGD 2/9/22, on protonix) s/p  DCD DDRT ( 1A/1V/1U stented) on 4/21/2022 with Thymoglobulin Induction He was discharged home 4/27 w/ outpatient HD (last HD 4/27 before discharge) now directly admitted for weakness, found to have new hematomas around kidney and downtrending h/h on labs.             Anemia  -Renal US w/ hematomas and downtrending H/H  - Maintain Hgb >/= 8   -continue to hold home Eliquis 2.5 BID given hematomas and H/H downtrending.  -since admission s/p 2 u PRBC   -No  HD today   -reg diet.   -strict I/Os  -daily AM labs including tac level  -PT consulted, will f/u recs for rehab. CM aware.     Immuno  Env by level, mmf 1gm bid, pred 5  ppx: nystatin, bactrim, valcyte renally dosed    HTN  on home amlodipine 10 mg QD  increase coreg to 6.25 mg bid from 3.125mg BID    Afib  Eliquis/ plavix on hold   on coreg for rate control     Seizures  c/w home keppra   c/w divalproex dose

## 2022-05-01 NOTE — PROGRESS NOTE ADULT - SUBJECTIVE AND OBJECTIVE BOX
[facility] DIVISION OF KIDNEY DISEASES AND HYPERTENSION -- FOLLOW UP NOTE  --------------------------------------------------------------------------------  Chief Complaint:    24 hour events/subjective:  Patient was seen and examined at bedside      PAST HISTORY  --------------------------------------------------------------------------------  No significant changes to PMH, PSH, FHx, SHx, unless otherwise noted    ALLERGIES & MEDICATIONS  --------------------------------------------------------------------------------  Allergies    No Known Allergies    Intolerances      Standing Inpatient Medications  amLODIPine   Tablet 10 milliGRAM(s) Oral daily  atorvastatin 40 milliGRAM(s) Oral at bedtime  carvedilol 3.125 milliGRAM(s) Oral every 12 hours  chlorhexidine 2% Cloths 1 Application(s) Topical daily  dextrose 5%. 1000 milliLiter(s) IV Continuous <Continuous>  dextrose 5%. 1000 milliLiter(s) IV Continuous <Continuous>  dextrose 50% Injectable 25 Gram(s) IV Push once  dextrose 50% Injectable 12.5 Gram(s) IV Push once  dextrose 50% Injectable 25 Gram(s) IV Push once  diVALproex  milliGRAM(s) Oral <User Schedule>  diVALproex  milliGRAM(s) Oral <User Schedule>  furosemide    Tablet 80 milliGRAM(s) Oral two times a day  glucagon  Injectable 1 milliGRAM(s) IntraMuscular once  insulin glargine Injectable (LANTUS) 7 Unit(s) SubCutaneous at bedtime  insulin lispro (ADMELOG) corrective regimen sliding scale   SubCutaneous three times a day before meals  insulin lispro Injectable (ADMELOG) 7 Unit(s) SubCutaneous three times a day before meals  levETIRAcetam 250 milliGRAM(s) Oral every 12 hours  levothyroxine 50 MICROGram(s) Oral daily  mycophenolate mofetil 1000 milliGRAM(s) Oral <User Schedule>  nystatin    Suspension 947593 Unit(s) Oral four times a day  pantoprazole    Tablet 40 milliGRAM(s) Oral before breakfast  predniSONE   Tablet 5 milliGRAM(s) Oral daily  sevelamer carbonate 800 milliGRAM(s) Oral every 8 hours  sodium bicarbonate 1300 milliGRAM(s) Oral every 12 hours  tacrolimus ER Tablet (ENVARSUS XR) 3 milliGRAM(s) Oral <User Schedule>  trimethoprim   80 mG/sulfamethoxazole 400 mG 1 Tablet(s) Oral daily  valGANciclovir 450 milliGRAM(s) Oral <User Schedule>    PRN Inpatient Medications  dextrose Oral Gel 15 Gram(s) Oral once PRN  senna 2 Tablet(s) Oral at bedtime PRN      REVIEW OF SYSTEMS  --------------------------------------------------------------------------------  Gen: +fatigue and weakness. no fever  Skin: No rashes  Head/Eyes/Ears/Mouth: No headache;No sore throat  Respiratory: No dyspnea, cough,   CV: No chest pain, PND, orthopnea  GI: No abdominal pain, diarrhea, constipation, nausea, vomiting  Transplant: No pain  : No increased frequency, dysuria, hematuria, nocturia  MSK: No joint pain/swelling; no back pain; no edema  Neuro: No dizziness/lightheadedness, weakness, seizures, numbness, tingling  Psych: No significant nervousness, anxiety, stress, depression    All other systems were reviewed and are negative, except as noted.    VITALS/PHYSICAL EXAM  --------------------------------------------------------------------------------  T(C): 36.5 (05-01-22 @ 06:24), Max: 36.7 (04-30-22 @ 09:01)  HR: 75 (05-01-22 @ 06:24) (61 - 79)  BP: 157/69 (05-01-22 @ 06:24) (118/58 - 171/71)  RR: 18 (05-01-22 @ 06:24) (16 - 20)  SpO2: 92% (05-01-22 @ 06:24) (92% - 100%)  Wt(kg): --        04-30-22 @ 07:01  -  05-01-22 @ 07:00  --------------------------------------------------------  IN: 720 mL / OUT: 1425 mL / NET: -705 mL      Physical Exam:  	Gen: NAD  	HEENT: PERRL, supple neck, clear oropharynx  	Pulm: CTA B/L  	CV: RRR, S1S2; no rub  	Back: No spinal or CVA tenderness; no sacral edema  	Abd: +BS, soft, nontender/nondistended                      Transplant: No tenderness, swelling  	: No suprapubic tenderness  	UE: Warm, FROM; no edema; no asterixis  	LE: Warm, FROM; no edema  	Neuro: No focal deficits  	Psych: Normal affect and mood  	Skin: Warm, without rashes      LABS/STUDIES  --------------------------------------------------------------------------------              7.8    6.68  >-----------<  168      [05-01-22 @ 07:26]              24.3     132  |  93  |  50  ----------------------------<  96      [05-01-22 @ 07:11]  3.9   |  27  |  3.23        Ca     8.1     [05-01-22 @ 07:11]      Mg     1.8     [05-01-22 @ 07:11]      Phos  3.4     [05-01-22 @ 07:11]    TPro  5.6  /  Alb  2.6  /  TBili  0.4  /  DBili  x   /  AST  19  /  ALT  8   /  AlkPhos  78  [05-01-22 @ 07:11]              [04-30-22 @ 07:15]    Creatinine Trend:  SCr 3.23 [05-01 @ 07:11]  SCr 2.52 [04-30 @ 07:15]  SCr 3.68 [04-29 @ 06:25]  SCr 3.44 [04-28 @ 17:35]  SCr 4.20 [04-27 @ 06:49]    Tacrolimus (), Serum: 7.4 ng/mL (04-30 @ 07:49)  Tacrolimus (), Serum: 7.2 ng/mL (04-29 @ 07:50)  Tacrolimus (), Serum: 8.7 ng/mL (04-27 @ 07:17)  Tacrolimus (), Serum: 10.3 ng/mL (04-26 @ 07:36)            Iron 55, TIBC 191, %sat 29      [02-04-22 @ 00:04]  Ferritin 1287      [02-04-22 @ 00:04]  PTH -- (Ca 9.2)      [02-05-22 @ 10:13]   294  HbA1c 6.0      [02-21-20 @ 08:53]  Lipid: chol 118, TG 54, HDL 59, LDL --      [06-27-21 @ 09:44]    HBsAb 15168.2      [04-21-22 @ 14:13]  HBsAg Nonreact      [04-21-22 @ 14:13]  HBcAb Reactive      [04-21-22 @ 14:13]  HCV 0.25, Nonreact      [04-21-22 @ 14:13]  HIV Nonreact      [04-21-22 @ 14:47]

## 2022-05-01 NOTE — PROGRESS NOTE ADULT - SUBJECTIVE AND OBJECTIVE BOX
[facility]   MD NINA MASON MD KRISTINE SOLTANPOUR, DO ANGELA WONG, PA    TEL:  OFFICE: [phone]  From 5pm-7am Answering Service [phone]    -- RENAL FOLLOW UP NOTE ---Date of Service 05-01-22 @ 20:13    Patient is a 66y old  Male who presents with a chief complaint of Weakness (01 May 2022 16:20)      Patient seen and examined at bedside. No chest pain/sob    VITALS:  T(F): 97.4 (05-01-22 @ 17:00), Max: 98.1 (05-01-22 @ 09:00)  HR: 64 (05-01-22 @ 17:00)  BP: 134/66 (05-01-22 @ 17:00)  RR: 18 (05-01-22 @ 17:00)  SpO2: 99% (05-01-22 @ 17:00)  Wt(kg): --    04-30 @ 07:01  -  05-01 @ 07:00  --------------------------------------------------------  IN: 720 mL / OUT: 1425 mL / NET: -705 mL    05-01 @ 07:01  -  05-01 @ 21:13  --------------------------------------------------------  IN: 720 mL / OUT: 550 mL / NET: 170 mL          PHYSICAL EXAM:  Constitutional: NAD  Neck: No JVD  Respiratory: CTAB, no wheezes, rales or rhonchi  Cardiovascular: S1, S2, RRR  Gastrointestinal: BS+, soft, NT/ND  Extremities: No peripheral edema    Hospital Medications:   MEDICATIONS  (STANDING):  amLODIPine   Tablet 10 milliGRAM(s) Oral daily  atorvastatin 40 milliGRAM(s) Oral at bedtime  carvedilol 3.125 milliGRAM(s) Oral every 12 hours  chlorhexidine 2% Cloths 1 Application(s) Topical daily  dextrose 5%. 1000 milliLiter(s) (100 mL/Hr) IV Continuous <Continuous>  dextrose 5%. 1000 milliLiter(s) (50 mL/Hr) IV Continuous <Continuous>  dextrose 50% Injectable 25 Gram(s) IV Push once  dextrose 50% Injectable 12.5 Gram(s) IV Push once  dextrose 50% Injectable 25 Gram(s) IV Push once  diVALproex  milliGRAM(s) Oral <User Schedule>  diVALproex  milliGRAM(s) Oral <User Schedule>  furosemide    Tablet 80 milliGRAM(s) Oral two times a day  glucagon  Injectable 1 milliGRAM(s) IntraMuscular once  insulin glargine Injectable (LANTUS) 7 Unit(s) SubCutaneous at bedtime  insulin lispro (ADMELOG) corrective regimen sliding scale   SubCutaneous three times a day before meals  insulin lispro Injectable (ADMELOG) 5 Unit(s) SubCutaneous three times a day before meals  levETIRAcetam 250 milliGRAM(s) Oral every 12 hours  levothyroxine 50 MICROGram(s) Oral daily  mycophenolate mofetil 1000 milliGRAM(s) Oral <User Schedule>  nystatin    Suspension 539992 Unit(s) Oral four times a day  pantoprazole    Tablet 40 milliGRAM(s) Oral before breakfast  predniSONE   Tablet 5 milliGRAM(s) Oral daily  sevelamer carbonate 800 milliGRAM(s) Oral every 8 hours  sodium bicarbonate 1300 milliGRAM(s) Oral every 12 hours  tacrolimus ER Tablet (ENVARSUS XR) 3 milliGRAM(s) Oral <User Schedule>  trimethoprim   80 mG/sulfamethoxazole 400 mG 1 Tablet(s) Oral daily  valGANciclovir 450 milliGRAM(s) Oral <User Schedule>    Tacrolimus (), Serum: 6.5 ng/mL (05-01 @ 08:32)    LABS:  05-01    132<L>  |  93<L>  |  50<H>  ----------------------------<  96  3.9   |  27  |  3.23<H>    Ca    8.1<L>      01 May 2022 07:11  Phos  3.4     05-01  Mg     1.8     05-01    TPro  5.6<L>  /  Alb  2.6<L>  /  TBili  0.4  /  DBili      /  AST  19  /  ALT  8<L>  /  AlkPhos  78  05-01    Creatinine Trend: 3.23 <--, 2.52 <--, 3.68 <--, 3.44 <--, 4.20 <--, 3.72 <--, 5.48 <--    Albumin, Serum: 2.6 g/dL (05-01 @ 07:11)  Phosphorus Level, Serum: 3.4 mg/dL (05-01 @ 07:11)                              7.8    6.68  )-----------( 168      ( 01 May 2022 07:26 )             24.3     Urine Studies:      Iron 55, TIBC 191, %sat 29      [02-04-22 @ 00:04]  Ferritin 1287      [02-04-22 @ 00:04]  PTH -- (Ca 9.2)      [02-05-22 @ 10:13]   294  HbA1c 6.0      [02-21-20 @ 08:53]  Lipid: chol 118, TG 54, HDL 59, LDL --      [06-27-21 @ 09:44]    HBsAb 82927.2      [04-21-22 @ 14:13]  HBsAg Nonreact      [04-21-22 @ 14:13]  HBcAb Reactive      [04-21-22 @ 14:13]  HCV 0.25, Nonreact      [04-21-22 @ 14:13]  HIV Nonreact      [04-21-22 @ 14:47]      RADIOLOGY & ADDITIONAL STUDIES:

## 2022-05-01 NOTE — PROGRESS NOTE ADULT - ASSESSMENT
66 yr old man with ESRD due to DM on HD since 2019 presenting for DDRT. Pt underwent DDRT on 4/21/22 with DGF and HD dependant presenting with increased lethargy and symptomatic severe anemia with hematomas around allograft. Primary Nephrologist Dr. Rey/Iliana.   Donor: 56 y/o M, KDPI 82%, PHS high risk, terminal Cr 1.34, CMV pos, EBV pos, HCV OPAL neg)       1. s/p DDRT on 4/21/22 -  Allograft function with DGF and HD dependant. Last HD done on 4/29/22.  On Lasix 80 mg PO BID.   2.Immunosuppression- s/p Thymo induction. On Envarsus adjust as per levels. target goal 8-10, Continue Cellcept 1 gm BID and Prednisone 5 mg po daily . Infection ppx with  Nystatin, bactrim, Valcyte.  3. Seizures- Continue Depakote and Keppra.  4.Hypertension-  Continue Amlodipine and Coreg 3.125 mg po bid   5.DM - On insulin. Continue and adjust as per BS levels.  6.Anemia due to acute blood loss, generalized weakness in setting of severe symptomatic anemia on admission. Hemoglobin was low in range of ~6 on admission on 4/28/22. US allograft showed collections c/w hematomas. Eliquis discontinued . 2 Units PRBC transfused on 4/28/22. and 1 unit of PRBC on 4/29/ with HD . Will keep hemoglobin > 8 given hx of CAD. Monitor H/H. Avoid AC.

## 2022-05-01 NOTE — PROGRESS NOTE ADULT - SUBJECTIVE AND OBJECTIVE BOX
Transplant Surgery - Multidisciplinary Rounds  --------------------------------------------------------------  DDRT Date: 4/21/22    Present: Patient seen and examined with multidisciplinary team including Transplant Surgeon: Dr. Mann, Nephrologist: Dr. AKILAH Lomeli and Altagracia Montelongo  and unit RN during am rounds.  Disciplines not in attendance will be notified of the plan.    HPI: 66M with history of HTN, HLD, DM (on insulin), ESRD (2/2 DM and HTN) on HD via permacath since 3yrs ago MWF, afib (on eliquis 2.5mg BID), hx of stroke (2019 2/2 afib), focal seizure (on depakote, keppra), hx of CABG (2020), hx of nonbleeding gastric/duodenal ulcer (EGD 2/9/22, on protonix)    s/p  DCD DDRT ( 1A/1V/1U stented) on 4/21/2022 with Thymoglobulin Induction   - Donor: Donor: 58 y/o M, KDPI 82%, PHS high risk, terminal Cr 1.34, CMV pos, EBV pos, HCV OPAL neg)   - Graft: post op renal doppler with  good perfusion; course c/b delayed graft function requiring HD inpatient and on discharge.  - A fib: resumed Eliquis; Plavix not restarted yet    He was discharged home yesterday w/ outpatient HD (last HD yesterday before discharge) now directly admitted after family called stating patient felt weak and family was not able take care of patient at home. Per son, it was very diffcult for the patient to climb the 6 stairs required to go inside the house. Son states that patient has had increased weakness for past few weeks since before the transplant and family had planned to get him into physical therapy; however he was offered the kidney transplant therefore the postponed the PT plans.     Patient currently AOX3, NAD. Sensory/motor intact. Denies fevers/chills.    Interval Events:  -Afebrile, stable vitals.   - endorses feeling stronger  - PT seen today OOB walking in hallway  - UOP 1.4 L, Sr. Cr 3.23 from 2.52 after HD       Immunosuppression:  -Induction: Thymo induction  -Immuno: Env per level, MMF 1g BID, Pred 5  -ongoing monitoring for signs of infection.     Potential Discharge date: pending clinical improvement.     Education:  Medications    Plan of care:  See Below        MEDICATIONS  (STANDING):  amLODIPine   Tablet 10 milliGRAM(s) Oral daily  atorvastatin 40 milliGRAM(s) Oral at bedtime  carvedilol 3.125 milliGRAM(s) Oral every 12 hours  chlorhexidine 2% Cloths 1 Application(s) Topical daily  dextrose 5%. 1000 milliLiter(s) (100 mL/Hr) IV Continuous <Continuous>  dextrose 5%. 1000 milliLiter(s) (50 mL/Hr) IV Continuous <Continuous>  dextrose 50% Injectable 25 Gram(s) IV Push once  dextrose 50% Injectable 12.5 Gram(s) IV Push once  dextrose 50% Injectable 25 Gram(s) IV Push once  diVALproex  milliGRAM(s) Oral <User Schedule>  diVALproex  milliGRAM(s) Oral <User Schedule>  furosemide    Tablet 80 milliGRAM(s) Oral two times a day  glucagon  Injectable 1 milliGRAM(s) IntraMuscular once  glycerin Suppository - Adult 1 Suppository(s) Rectal once  insulin glargine Injectable (LANTUS) 7 Unit(s) SubCutaneous at bedtime  insulin lispro (ADMELOG) corrective regimen sliding scale   SubCutaneous three times a day before meals  insulin lispro Injectable (ADMELOG) 7 Unit(s) SubCutaneous three times a day before meals  levETIRAcetam 250 milliGRAM(s) Oral every 12 hours  levothyroxine 50 MICROGram(s) Oral daily  mycophenolate mofetil 1000 milliGRAM(s) Oral <User Schedule>  nystatin    Suspension [id number] Unit(s) Oral four times a day  pantoprazole    Tablet 40 milliGRAM(s) Oral before breakfast  predniSONE   Tablet 5 milliGRAM(s) Oral daily  sevelamer carbonate 800 milliGRAM(s) Oral every 8 hours  sodium bicarbonate 1300 milliGRAM(s) Oral every 12 hours  tacrolimus ER Tablet (ENVARSUS XR) 3 milliGRAM(s) Oral <User Schedule>  trimethoprim   80 mG/sulfamethoxazole 400 mG 1 Tablet(s) Oral daily  valGANciclovir 450 milliGRAM(s) Oral <User Schedule>    MEDICATIONS  (PRN):  dextrose Oral Gel 15 Gram(s) Oral once PRN Blood Glucose LESS THAN 70 milliGRAM(s)/deciliter  senna 2 Tablet(s) Oral at bedtime PRN Constipation  traMADol 25 milliGRAM(s) Oral every 4 hours PRN Moderate Pain (4 - 6)      PAST MEDICAL & SURGICAL HISTORY:  Hypertension    Diabetes    Dyslipidemia    CAD (Coronary Artery Disease)  with Stents in 06/2009 and 6/2019, s/p off-pump C3L on 8/13/20    Hypothyroidism    CVA (cerebral vascular accident)  12/13/19 with residual bilateral weakness    Anemia    PEG (percutaneous endoscopic gastrostomy) status  removed July 2020    Intubation of airway performed without difficulty  Dec 2019  CVA c/b status epilepticus requiring intubation and PEG in 12/2019-    ESRD on dialysis  M/W/F    Seizures  after CVA, last seizure was last week 4/07/22    History of insertion of stent into coronary artery bypass graft  2009 and 2019    S/P CABG x 3  off pump C3L on 8/13/20        Vital Signs Last 24 Hrs  T(C): 36.3 (01 May 2022 13:00), Max: 36.7 (01 May 2022 00:39)  T(F): 97.4 (01 May 2022 13:00), Max: 98.1 (01 May 2022 09:00)  HR: 68 (01 May 2022 13:00) (67 - 79)  BP: 126/69 (01 May 2022 13:00) (126/69 - 160/73)  BP(mean): --  RR: 18 (01 May 2022 13:00) (16 - 18)  SpO2: 99% (01 May 2022 13:00) (92% - 100%)    I&O's Summary    30 Apr 2022 07:01  -  01 May 2022 07:00  --------------------------------------------------------  IN: 720 mL / OUT: 1425 mL / NET: -705 mL    01 May 2022 07:01  -  01 May 2022 16:24  --------------------------------------------------------  IN: 480 mL / OUT: 500 mL / NET: -20 mL          LABS:                        7.8    6.68  )-----------( 168      ( 01 May 2022 07:26 )             24.3     05-01    132<L>  |  93<L>  |  50<H>  ----------------------------<  96  3.9   |  27  |  3.23<H>    Ca    8.1<L>      01 May 2022 07:11  Phos  3.4     05-01  Mg     1.8     05-01    TPro  5.6<L>  /  Alb  2.6<L>  /  TBili  0.4  /  DBili  x   /  AST  19  /  ALT  8<L>  /  AlkPhos  78  05-01        CAPILLARY BLOOD GLUCOSE      POCT Blood Glucose.: 249 mg/dL (01 May 2022 12:31)  POCT Blood Glucose.: 84 mg/dL (01 May 2022 09:32)  POCT Blood Glucose.: 194 mg/dL (30 Apr 2022 21:17)  POCT Blood Glucose.: 79 mg/dL (30 Apr 2022 17:30)    LIVER FUNCTIONS - ( 01 May 2022 07:11 )  Alb: 2.6 g/dL / Pro: 5.6 g/dL / ALK PHOS: 78 U/L / ALT: 8 U/L / AST: 19 U/L / GGT: x             Tacrolimus (), Serum: 6.5 ng/mL (05-01 @ 08:32)      Cultures:      Review of Systems: Gen: No weight changes, fatigue, fevers/chills, +weakness  Skin: No rashes  Head/Eyes/Ears/Mouth: No headache; Normal hearing; Normal vision w/o blurriness; No sinus pain/discomfort, sore throat  Respiratory: No dyspnea, cough, wheezing, hemoptysis  CV: No chest pain, PND, orthopnea  GI: No abdominal pain, diarrhea, constipation, nausea, vomiting, melena, hematochezia  : No increased frequency, dysuria, hematuria, nocturia  MSK: No joint pain/swelling; no back pain; no edema  Neuro: No dizziness/lightheadedness, weakness, seizures, numbness, tingling  Heme: No easy bruising or bleeding  Endo: No heat/cold intolerance  Psych: No significant nervousness, anxiety, stress, depression  All other systems were reviewed and are negative, except as noted    Physical Exam: Constitutional: Well developed / well nourished  Eyes: Anicteric, PERRLA  ENMT: nc/at  Neck: Supple  Respiratory: CTA B/L  Cardiovascular: RRR  Gastrointestinal: Soft abdomen, NT, ND. Incision c/d/i  Genitourinary:  Voiding spontaneously  Extremities: SCD's in place and working bilaterally  Vascular: Palpable dp pulses bilaterally  Neurological: A&O x3  Musculoskeletal: Moving all extremities  Psychiatric: Responsive

## 2022-05-01 NOTE — PROGRESS NOTE ADULT - ASSESSMENT
66M with history of HTN, HLD, DM (on insulin), ESRD (2/2 DM and HTN) on HD via permacath since 3yrs ago MWF, afib (on eliquis 2.5mg BID), hx of stroke (2019 2/2 afib), focal seizure (on depakote, keppra), hx of CABG (2020), hx of nonbleeding gastric/duodenal ulcer (EGD 2/9/22, on protonix), s/p DCD KTX, came back due to weakness    s/p  DCD KTX ( 1A/1V/1U stented) on 4/21/2022 with Thymoglobulin Induction   - Donor: Donor: 56 y/o M, KDPI 82%, PHS high risk, terminal Cr 1.34, CMV pos, EBV pos, HCV OPAL neg)   - Graft: post op renal doppler with  good perfusion; course c/b delayed graft function requiring HD    - A fib: resumed Eliquis; Plavix not restarted yet  - Immunosuppressant per transplant team: Env by level, MMF 1gm bid, Prednisone 5  ppx: nystatin, bactrim, valcyte renally dosed  - Remains HD dependent, HD as needed. His regular schedule as outpatient is MW  - planned for HD tomorrow per transplant team.    - Monitor urine output      HTN:  -acceptable   -Monitor BP    Anemia:  monitor Hgb     MBD:  Calcium corrected is normal  monitor Po4, Ca

## 2022-05-01 NOTE — PROGRESS NOTE ADULT - ASSESSMENT
66M with history of HTN, HLD, DM (on insulin), ESRD (2/2 DM and HTN) on HD via permacath since 3yrs ago MWF, afib (on eliquis 2.5mg BID), hx of stroke (2019 2/2 afib), focal seizure (on depakote, keppra), hx of CABG (2020), hx of nonbleeding gastric/duodenal ulcer (EGD 2/9/22, on protonix) s/p  DCD DDRT ( 1A/1V/1U stented) on 4/21/2022 with Thymoglobulin Induction He was discharged home 4/27 w/ outpatient HD (last HD 4/27 before discharge) now directly admitted for weakness, found to have new hematomas around kidney and downtrending h/h on labs.             Anemia  -Renal US w/ hematomas and downtrending H/H  - Maintain Hgb >/= 8   -continue to hold home Eliquis 2.5 BID given hematomas and H/H downtrending.  -since admission s/p 2 u PRBC   -No  HD today   -reg diet.   -strict I/Os  -daily AM labs including tac level  -PT consulted, will f/u recs for rehab. CM aware.     Immuno  Env by level, mmf 1gm bid, pred 5  ppx: nystatin, bactrim, valcyte renally dosed    HTN  on home amlodipine 10 mg QD   coreg 3.125mg BID    Diabetes  FS AC/ HS  Lispro ISS, decrease premeal to  5 units   Lantus     Afib  Eliquis/ plavix on hold   on coreg for rate control     Seizures  c/w home keppra   c/w divalproex dose

## 2022-05-02 NOTE — PROGRESS NOTE ADULT - ASSESSMENT
66M with history of HTN, HLD, DM (on insulin), ESRD (2/2 DM and HTN) on HD via permacath since 3yrs ago MWF, afib (on eliquis 2.5mg BID), hx of stroke (2019 2/2 afib), focal seizure (on depakote, keppra), hx of CABG (2020), hx of nonbleeding gastric/duodenal ulcer (EGD 2/9/22, on protonix) s/p  DCD DDRT ( 1A/1V/1U stented) on 4/21/2022 with Thymoglobulin Induction He was discharged home 4/27 w/ outpatient HD (last HD 4/27 before discharge) now directly admitted for weakness, found to have new hematomas around kidney and downtrending h/h on labs.             Anemia  -Renal US w/ hematomas and downtrending H/H  - Maintain Hgb >/= 8   -continue to hold home Eliquis 2.5 BID given hematomas and H/H downtrending.  -since admission s/p 2 u PRBC   -No  HD today   -reg diet.   -strict I/Os  -daily AM labs including tac level  -PT consulted, will f/u recs for rehab. CM aware.     Immuno  Env by level, mmf 1gm bid, pred 5  ppx: nystatin, bactrim, valcyte renally dosed    HTN  on home amlodipine 10 mg QD   coreg 3.125mg BID    Diabetes  FS AC/ HS  Lispro ISS, decrease premeal to  5 units   Lantus     Afib  Eliquis/ plavix on hold   on coreg for rate control     Seizures  c/w home keppra   c/w divalproex dose  66M with history of HTN, HLD, DM (on insulin), ESRD (2/2 DM and HTN) on HD via permacath since 3yrs ago MWF, afib (on eliquis 2.5mg BID), hx of stroke (2019 2/2 afib), focal seizure (on depakote, keppra), hx of CABG (2020), hx of nonbleeding gastric/duodenal ulcer (EGD 2/9/22, on protonix) s/p  DCD DDRT ( 1A/1V/1U stented) on 4/21/2022 with Thymoglobulin Induction He was discharged home 4/27 w/ outpatient HD (last HD 4/27 before discharge) now directly admitted for weakness, found to have new hematomas around kidney and downtrending h/h on labs.             Anemia  -Renal US w/ hematomas and downtrending H/H  - Maintain Hgb >/= 8   -continue to hold home Eliquis 2.5 BID given hematomas and H/H downtrending.  -since admission s/p 2 u PRBC   - Stat 2 U prbc  - Urgent non con ct abd/ pelvis- large hematoma displacing Tx kidney/ 2nd hematoma lower abd   NPO  - plan for OR for abd exploration / washout   -strict I/Os  -daily AM labs including tac level  -PT consulted, will f/u recs for rehab. CM aware.   - start sodium bicarb 650 mg BID     Immuno  Env by level, mmf 1gm bid, pred 5  ppx: nystatin, bactrim, valcyte renally dosed    HTN  on home amlodipine 10 mg QD   coreg 3.125mg BID    Diabetes  FS AC/ HS  Lispro ISS, decrease premeal to  5 units   Lantus     Afib  Eliquis/ plavix on hold   on coreg for rate control     Seizures  c/w home keppra   c/w divalproex dose

## 2022-05-02 NOTE — PROGRESS NOTE ADULT - SUBJECTIVE AND OBJECTIVE BOX
Transplant Surgery - Multidisciplinary Rounds  --------------------------------------------------------------  DDRT Date: 4/21/22    Present: Patient seen and examined with multidisciplinary team including Transplant Surgeon: Dr. Mann, Nephrologist: Dr. AKILAH Lomeli and Altagracia Montelongo  and unit RN during am rounds.  Disciplines not in attendance will be notified of the plan.    HPI: 66M with history of HTN, HLD, DM (on insulin), ESRD (2/2 DM and HTN) on HD via permacath since 3yrs ago MWF, afib (on eliquis 2.5mg BID), hx of stroke (2019 2/2 afib), focal seizure (on depakote, keppra), hx of CABG (2020), hx of nonbleeding gastric/duodenal ulcer (EGD 2/9/22, on protonix)    s/p  DCD DDRT ( 1A/1V/1U stented) on 4/21/2022 with Thymoglobulin Induction   - Donor: Donor: 58 y/o M, KDPI 82%, PHS high risk, terminal Cr 1.34, CMV pos, EBV pos, HCV OPAL neg)   - Graft: post op renal doppler with  good perfusion; course c/b delayed graft function requiring HD inpatient and on discharge.  - A fib: resumed Eliquis; Plavix not restarted yet    He was discharged home yesterday w/ outpatient HD (last HD yesterday before discharge) now directly admitted after family called stating patient felt weak and family was not able take care of patient at home. Per son, it was very diffcult for the patient to climb the 6 stairs required to go inside the house. Son states that patient has had increased weakness for past few weeks since before the transplant and family had planned to get him into physical therapy; however he was offered the kidney transplant therefore the postponed the PT plans.     Patient currently AOX3, NAD. Sensory/motor intact. Denies fevers/chills.    Interval Events:    Immunosuppression:  -Induction: Thymo induction  -Immuno: Env per level, MMF 1g BID, Pred 5  -ongoing monitoring for signs of infection.     Potential Discharge date: pending clinical improvement.     Education:  Medications    Plan of care:  See Below        MEDICATIONS  (STANDING):  amLODIPine   Tablet 10 milliGRAM(s) Oral daily  atorvastatin 40 milliGRAM(s) Oral at bedtime  carvedilol 3.125 milliGRAM(s) Oral every 12 hours  chlorhexidine 2% Cloths 1 Application(s) Topical daily  dextrose 5%. 1000 milliLiter(s) (100 mL/Hr) IV Continuous <Continuous>  dextrose 5%. 1000 milliLiter(s) (50 mL/Hr) IV Continuous <Continuous>  dextrose 50% Injectable 25 Gram(s) IV Push once  dextrose 50% Injectable 12.5 Gram(s) IV Push once  dextrose 50% Injectable 25 Gram(s) IV Push once  diVALproex  milliGRAM(s) Oral <User Schedule>  diVALproex  milliGRAM(s) Oral <User Schedule>  furosemide    Tablet 80 milliGRAM(s) Oral two times a day  glucagon  Injectable 1 milliGRAM(s) IntraMuscular once  glycerin Suppository - Adult 1 Suppository(s) Rectal once  insulin glargine Injectable (LANTUS) 7 Unit(s) SubCutaneous at bedtime  insulin lispro (ADMELOG) corrective regimen sliding scale   SubCutaneous three times a day before meals  insulin lispro Injectable (ADMELOG) 7 Unit(s) SubCutaneous three times a day before meals  levETIRAcetam 250 milliGRAM(s) Oral every 12 hours  levothyroxine 50 MICROGram(s) Oral daily  mycophenolate mofetil 1000 milliGRAM(s) Oral <User Schedule>  nystatin    Suspension 877155 Unit(s) Oral four times a day  pantoprazole    Tablet 40 milliGRAM(s) Oral before breakfast  predniSONE   Tablet 5 milliGRAM(s) Oral daily  sevelamer carbonate 800 milliGRAM(s) Oral every 8 hours  sodium bicarbonate 1300 milliGRAM(s) Oral every 12 hours  tacrolimus ER Tablet (ENVARSUS XR) 3 milliGRAM(s) Oral <User Schedule>  trimethoprim   80 mG/sulfamethoxazole 400 mG 1 Tablet(s) Oral daily  valGANciclovir 450 milliGRAM(s) Oral <User Schedule>    MEDICATIONS  (PRN):  dextrose Oral Gel 15 Gram(s) Oral once PRN Blood Glucose LESS THAN 70 milliGRAM(s)/deciliter  senna 2 Tablet(s) Oral at bedtime PRN Constipation  traMADol 25 milliGRAM(s) Oral every 4 hours PRN Moderate Pain (4 - 6)      PAST MEDICAL & SURGICAL HISTORY:  Hypertension    Diabetes    Dyslipidemia    CAD (Coronary Artery Disease)  with Stents in 06/2009 and 6/2019, s/p off-pump C3L on 8/13/20    Hypothyroidism    CVA (cerebral vascular accident)  12/13/19 with residual bilateral weakness    Anemia    PEG (percutaneous endoscopic gastrostomy) status  removed July 2020    Intubation of airway performed without difficulty  Dec 2019  CVA c/b status epilepticus requiring intubation and PEG in 12/2019-    ESRD on dialysis  M/W/F    Seizures  after CVA, last seizure was last week 4/07/22    History of insertion of stent into coronary artery bypass graft  2009 and 2019    S/P CABG x 3  off pump C3L on 8/13/20        Vital Signs Last 24 Hrs  T(C): 36.3 (01 May 2022 13:00), Max: 36.7 (01 May 2022 00:39)  T(F): 97.4 (01 May 2022 13:00), Max: 98.1 (01 May 2022 09:00)  HR: 68 (01 May 2022 13:00) (67 - 79)  BP: 126/69 (01 May 2022 13:00) (126/69 - 160/73)  BP(mean): --  RR: 18 (01 May 2022 13:00) (16 - 18)  SpO2: 99% (01 May 2022 13:00) (92% - 100%)    I&O's Summary    30 Apr 2022 07:01  -  01 May 2022 07:00  --------------------------------------------------------  IN: 720 mL / OUT: 1425 mL / NET: -705 mL    01 May 2022 07:01  -  01 May 2022 16:24  --------------------------------------------------------  IN: 480 mL / OUT: 500 mL / NET: -20 mL          LABS:                        7.8    6.68  )-----------( 168      ( 01 May 2022 07:26 )             24.3     05-01    132<L>  |  93<L>  |  50<H>  ----------------------------<  96  3.9   |  27  |  3.23<H>    Ca    8.1<L>      01 May 2022 07:11  Phos  3.4     05-01  Mg     1.8     05-01    TPro  5.6<L>  /  Alb  2.6<L>  /  TBili  0.4  /  DBili  x   /  AST  19  /  ALT  8<L>  /  AlkPhos  78  05-01        CAPILLARY BLOOD GLUCOSE      POCT Blood Glucose.: 249 mg/dL (01 May 2022 12:31)  POCT Blood Glucose.: 84 mg/dL (01 May 2022 09:32)  POCT Blood Glucose.: 194 mg/dL (30 Apr 2022 21:17)  POCT Blood Glucose.: 79 mg/dL (30 Apr 2022 17:30)    LIVER FUNCTIONS - ( 01 May 2022 07:11 )  Alb: 2.6 g/dL / Pro: 5.6 g/dL / ALK PHOS: 78 U/L / ALT: 8 U/L / AST: 19 U/L / GGT: x             Tacrolimus (), Serum: 6.5 ng/mL (05-01 @ 08:32)      Cultures:      Review of Systems: Gen: No weight changes, fatigue, fevers/chills, +weakness  Skin: No rashes  Head/Eyes/Ears/Mouth: No headache; Normal hearing; Normal vision w/o blurriness; No sinus pain/discomfort, sore throat  Respiratory: No dyspnea, cough, wheezing, hemoptysis  CV: No chest pain, PND, orthopnea  GI: No abdominal pain, diarrhea, constipation, nausea, vomiting, melena, hematochezia  : No increased frequency, dysuria, hematuria, nocturia  MSK: No joint pain/swelling; no back pain; no edema  Neuro: No dizziness/lightheadedness, weakness, seizures, numbness, tingling  Heme: No easy bruising or bleeding  Endo: No heat/cold intolerance  Psych: No significant nervousness, anxiety, stress, depression  All other systems were reviewed and are negative, except as noted    Physical Exam: Constitutional: Well developed / well nourished  Eyes: Anicteric, PERRLA  ENMT: nc/at  Neck: Supple  Respiratory: CTA B/L  Cardiovascular: RRR  Gastrointestinal: Soft abdomen, NT, ND. Incision c/d/i  Genitourinary:  Voiding spontaneously  Extremities: SCD's in place and working bilaterally  Vascular: Palpable dp pulses bilaterally  Neurological: A&O x3  Musculoskeletal: Moving all extremities  Psychiatric: Responsive   Transplant Surgery - Multidisciplinary Rounds  --------------------------------------------------------------  DDRT Date: 4/21/22    Present: Patient seen and examined with multidisciplinary team including Transplant Surgeon: Dr. Mann, Nephrologist: Dr. AKILAH Lomeli and Altagracia Montelongo  and unit RN during am rounds.  Disciplines not in attendance will be notified of the plan.    HPI: 66M with history of HTN, HLD, DM (on insulin), ESRD (2/2 DM and HTN) on HD via permacath since 3yrs ago MWF, afib (on eliquis 2.5mg BID), hx of stroke (2019 2/2 afib), focal seizure (on depakote, keppra), hx of CABG (2020), hx of nonbleeding gastric/duodenal ulcer (EGD 2/9/22, on protonix)    s/p  DCD DDRT ( 1A/1V/1U stented) on 4/21/2022 with Thymoglobulin Induction   - Donor: Donor: 58 y/o M, KDPI 82%, PHS high risk, terminal Cr 1.34, CMV pos, EBV pos, HCV OPAL neg)   - Graft: post op renal doppler with  good perfusion; course c/b delayed graft function requiring HD inpatient and on discharge.  - A fib: resumed Eliquis; Plavix not restarted yet    He was discharged home yesterday w/ outpatient HD (last HD yesterday before discharge) now directly admitted after family called stating patient felt weak and family was not able take care of patient at home. Per son, it was very diffcult for the patient to climb the 6 stairs required to go inside the house. Son states that patient has had increased weakness for past few weeks since before the transplant and family had planned to get him into physical therapy; however he was offered the kidney transplant therefore the postponed the PT plans.     Patient currently AOX3, NAD. Sensory/motor intact. Denies fevers/chills.    Interval Events:  Afebrile, VSS  this am acute drop h/h 5.5/17  urgent non con ct abd/ pelvis demonstrated 2 large hematomas   ml  hypoglycemic between meal yesterday premeal insulin adjusted      Immunosuppression:  -Induction: Thymo induction  -Immuno: Env per level, MMF 1g BID, Pred 5  -ongoing monitoring for signs of infection.     Potential Discharge date: pending clinical improvement.     Education:  Medications    Plan of care:  See Below      MEDICATIONS  (STANDING):    MEDICATIONS  (PRN):      PAST MEDICAL & SURGICAL HISTORY:  Hypertension    Diabetes    Dyslipidemia    CAD (Coronary Artery Disease)  with Stents in 06/2009 and 6/2019, s/p off-pump C3L on 8/13/20    Hypothyroidism    CVA (cerebral vascular accident)  12/13/19 with residual bilateral weakness    Anemia    PEG (percutaneous endoscopic gastrostomy) status  removed July 2020    Intubation of airway performed without difficulty  Dec 2019  CVA c/b status epilepticus requiring intubation and PEG in 12/2019-    ESRD on dialysis  M/W/F    Seizures  after CVA, last seizure was last week 4/07/22    History of insertion of stent into coronary artery bypass graft  2009 and 2019    S/P CABG x 3  off pump C3L on 8/13/20        Vital Signs Last 24 Hrs  T(C): 37 (02 May 2022 17:09), Max: 37.2 (02 May 2022 09:00)  T(F): 98.6 (02 May 2022 15:12), Max: 99 (02 May 2022 09:00)  HR: 67 (02 May 2022 17:09) (64 - 69)  BP: 159/72 (02 May 2022 17:09) (107/56 - 159/72)  BP(mean): --  RR: 18 (02 May 2022 17:09) (18 - 18)  SpO2: 100% (02 May 2022 17:09) (97% - 100%)    I&O's Summary    01 May 2022 07:01  -  02 May 2022 07:00  --------------------------------------------------------  IN: 960 mL / OUT: 670 mL / NET: 290 mL    02 May 2022 07:01  -  02 May 2022 17:23  --------------------------------------------------------  IN: 750 mL / OUT: 400 mL / NET: 350 mL          LABS:                        9.2    16.28 )-----------( 190      ( 02 May 2022 15:15 )             27.8     05-02    132<L>  |  91<L>  |  65<H>  ----------------------------<  153<H>  4.7   |  25  |  3.95<H>    Ca    8.0<L>      02 May 2022 07:05  Phos  4.7     05-02  Mg     1.8     05-02    TPro  5.2<L>  /  Alb  2.4<L>  /  TBili  0.3  /  DBili  x   /  AST  47<H>  /  ALT  33  /  AlkPhos  67  05-02    PT/INR - ( 02 May 2022 15:15 )   PT: 13.3 sec;   INR: 1.15 ratio         PTT - ( 02 May 2022 15:15 )  PTT:23.9 sec    CAPILLARY BLOOD GLUCOSE      POCT Blood Glucose.: 120 mg/dL (02 May 2022 13:39)  POCT Blood Glucose.: 190 mg/dL (02 May 2022 09:55)  POCT Blood Glucose.: 192 mg/dL (02 May 2022 08:40)  POCT Blood Glucose.: 195 mg/dL (01 May 2022 21:29)  POCT Blood Glucose.: 205 mg/dL (01 May 2022 17:37)    LIVER FUNCTIONS - ( 02 May 2022 07:05 )  Alb: 2.4 g/dL / Pro: 5.2 g/dL / ALK PHOS: 67 U/L / ALT: 33 U/L / AST: 47 U/L / GGT: x             Tacrolimus (), Serum: 5.3 ng/mL (05-02 @ 07:58)      Cultures:      Review of Systems: Gen: No weight changes, fatigue, fevers/chills, +weakness  Skin: No rashes  Head/Eyes/Ears/Mouth: No headache; Normal hearing; Normal vision w/o blurriness; No sinus pain/discomfort, sore throat  Respiratory: No dyspnea, cough, wheezing, hemoptysis  CV: No chest pain, PND, orthopnea  GI: No abdominal pain, diarrhea, constipation, nausea, vomiting, melena, hematochezia  : No increased frequency, dysuria, hematuria, nocturia  MSK: No joint pain/swelling; no back pain; no edema  Neuro: No dizziness/lightheadedness, weakness, seizures, numbness, tingling  Heme: No easy bruising or bleeding  Endo: No heat/cold intolerance  Psych: No significant nervousness, anxiety, stress, depression  All other systems were reviewed and are negative, except as noted    Physical Exam: Constitutional: Well developed / well nourished  Eyes: Anicteric, PERRLA  ENMT: nc/at  Neck: Supple  Respiratory: CTA B/L  Cardiovascular: RRR  Gastrointestinal: Soft abdomen, NT, ND. Incision c/d/i  Genitourinary:  Voiding spontaneously  Extremities: SCD's in place and working bilaterally  Vascular: Palpable dp pulses bilaterally  Neurological: A&O x3  Musculoskeletal: Moving all extremities  Psychiatric: Responsive

## 2022-05-02 NOTE — PROGRESS NOTE ADULT - SUBJECTIVE AND OBJECTIVE BOX
[facility] [place] [facility]   MD NINA MASON MD, PA KRISTINE SOLTANPOUR, DO INJUNG KO, NP    TEL:  OFFICE: [phone]    From 5pm-7am Answering Service [phone]    -- RENAL FOLLOW UP NOTE ---Date of Service 05-02-22 @ 14:44    Patient is a 66y old  Male who presents with a chief complaint of Weakness (02 May 2022 13:10)      Patient seen and examined at bedside. No chest pain/sob    VITALS:  T(F): 98.5 (05-02-22 @ 14:25), Max: 99 (05-02-22 @ 09:00)  HR: 67 (05-02-22 @ 14:25)  BP: 152/67 (05-02-22 @ 14:25)  RR: 18 (05-02-22 @ 14:25)  SpO2: 100% (05-02-22 @ 14:25)  Wt(kg): --    05-01 @ 07:01  -  05-02 @ 07:00  --------------------------------------------------------  IN: 960 mL / OUT: 670 mL / NET: 290 mL    05-02 @ 07:01  -  05-02 @ 14:44  --------------------------------------------------------  IN: 750 mL / OUT: 400 mL / NET: 350 mL          PHYSICAL EXAM:  Constitutional: NAD  Neck: No JVD  Respiratory: CTAB, no wheezes, rales or rhonchi  Cardiovascular: S1, S2, RRR  Gastrointestinal: BS+, soft, NT/ND  Extremities: No peripheral edema    Hospital Medications:   MEDICATIONS  (STANDING):  amLODIPine   Tablet 10 milliGRAM(s) Oral daily  atorvastatin 40 milliGRAM(s) Oral at bedtime  carvedilol 3.125 milliGRAM(s) Oral every 12 hours  chlorhexidine 2% Cloths 1 Application(s) Topical daily  dextrose 5%. 1000 milliLiter(s) (100 mL/Hr) IV Continuous <Continuous>  dextrose 5%. 1000 milliLiter(s) (50 mL/Hr) IV Continuous <Continuous>  dextrose 50% Injectable 25 Gram(s) IV Push once  dextrose 50% Injectable 12.5 Gram(s) IV Push once  dextrose 50% Injectable 25 Gram(s) IV Push once  diVALproex  milliGRAM(s) Oral <User Schedule>  diVALproex  milliGRAM(s) Oral <User Schedule>  furosemide    Tablet 80 milliGRAM(s) Oral two times a day  glucagon  Injectable 1 milliGRAM(s) IntraMuscular once  insulin glargine Injectable (LANTUS) 7 Unit(s) SubCutaneous at bedtime  insulin lispro (ADMELOG) corrective regimen sliding scale   SubCutaneous three times a day before meals  insulin lispro Injectable (ADMELOG) 5 Unit(s) SubCutaneous three times a day before meals  levETIRAcetam 250 milliGRAM(s) Oral every 12 hours  levothyroxine 50 MICROGram(s) Oral daily  mycophenolate mofetil 1000 milliGRAM(s) Oral <User Schedule>  nystatin    Suspension 533868 Unit(s) Oral four times a day  pantoprazole    Tablet 40 milliGRAM(s) Oral before breakfast  predniSONE   Tablet 5 milliGRAM(s) Oral daily  sevelamer carbonate 800 milliGRAM(s) Oral every 8 hours  sodium bicarbonate 1300 milliGRAM(s) Oral every 12 hours  tacrolimus ER Tablet (ENVARSUS XR) 3 milliGRAM(s) Oral <User Schedule>  trimethoprim   80 mG/sulfamethoxazole 400 mG 1 Tablet(s) Oral daily  valGANciclovir 450 milliGRAM(s) Oral <User Schedule>    Tacrolimus (), Serum: 5.3 ng/mL (05-02 @ 07:58)    LABS:  05-02    132<L>  |  91<L>  |  65<H>  ----------------------------<  153<H>  4.7   |  25  |  3.95<H>    Ca    8.0<L>      02 May 2022 07:05  Phos  4.7     05-02  Mg     1.8     05-02    TPro  5.2<L>  /  Alb  2.4<L>  /  TBili  0.3  /  DBili      /  AST  47<H>  /  ALT  33  /  AlkPhos  67  05-02    Creatinine Trend: 3.95 <--, 3.23 <--, 2.52 <--, 3.68 <--, 3.44 <--, 4.20 <--, 3.72 <--    Ferritin, Serum: 1505 ng/mL (05-02 @ 13:05)  Albumin, Serum: 2.4 g/dL (05-02 @ 07:05)  Phosphorus Level, Serum: 4.7 mg/dL (05-02 @ 07:05)                              5.5    11.54 )-----------( 168      ( 02 May 2022 08:18 )             17.5     Urine Studies:      Iron 55, TIBC 191, %sat 29      [02-04-22 @ 00:04]  Ferritin 1505      [05-02-22 @ 13:05]  PTH -- (Ca 9.2)      [02-05-22 @ 10:13]   294  HbA1c 6.0      [02-21-20 @ 08:53]  Lipid: chol 118, TG 54, HDL 59, LDL --      [06-27-21 @ 09:44]    HBsAb 77902.2      [04-21-22 @ 14:13]  HBsAg Nonreact      [04-21-22 @ 14:13]  HBcAb Reactive      [04-21-22 @ 14:13]  HCV 0.25, Nonreact      [04-21-22 @ 14:13]  HIV Nonreact      [04-21-22 @ 14:47]      RADIOLOGY & ADDITIONAL STUDIES:

## 2022-05-02 NOTE — PROVIDER CONTACT NOTE (CRITICAL VALUE NOTIFICATION) - ACTION/TREATMENT ORDERED:
WALDO Koehler made aware. Will administer 1PRBC as per order. Will continue to monitor.
CBC sent stat with T&C as per provider.
PA notified and made aware, get repeat CBC & T&S - per PA, will contact someone to get access on pt
Will transfuse as per order.

## 2022-05-02 NOTE — PROGRESS NOTE ADULT - SUBJECTIVE AND OBJECTIVE BOX
[facility] DIVISION OF KIDNEY DISEASES AND HYPERTENSION -- FOLLOW UP NOTE  --------------------------------------------------------------------------------  Chief Complaint:    24 hour events/subjective:  Patient was seen and examined at bedside    PAST HISTORY  --------------------------------------------------------------------------------  No significant changes to PMH, PSH, FHx, SHx, unless otherwise noted    ALLERGIES & MEDICATIONS  --------------------------------------------------------------------------------  Allergies    No Known Allergies    Intolerances      Standing Inpatient Medications  amLODIPine   Tablet 10 milliGRAM(s) Oral daily  atorvastatin 40 milliGRAM(s) Oral at bedtime  carvedilol 3.125 milliGRAM(s) Oral every 12 hours  chlorhexidine 2% Cloths 1 Application(s) Topical daily  dextrose 5%. 1000 milliLiter(s) IV Continuous <Continuous>  dextrose 5%. 1000 milliLiter(s) IV Continuous <Continuous>  dextrose 50% Injectable 25 Gram(s) IV Push once  dextrose 50% Injectable 12.5 Gram(s) IV Push once  dextrose 50% Injectable 25 Gram(s) IV Push once  diVALproex  milliGRAM(s) Oral <User Schedule>  diVALproex  milliGRAM(s) Oral <User Schedule>  furosemide    Tablet 80 milliGRAM(s) Oral two times a day  glucagon  Injectable 1 milliGRAM(s) IntraMuscular once  insulin glargine Injectable (LANTUS) 7 Unit(s) SubCutaneous at bedtime  insulin lispro (ADMELOG) corrective regimen sliding scale   SubCutaneous three times a day before meals  insulin lispro Injectable (ADMELOG) 5 Unit(s) SubCutaneous three times a day before meals  levETIRAcetam 250 milliGRAM(s) Oral every 12 hours  levothyroxine 50 MICROGram(s) Oral daily  mycophenolate mofetil 1000 milliGRAM(s) Oral <User Schedule>  nystatin    Suspension 969282 Unit(s) Oral four times a day  pantoprazole    Tablet 40 milliGRAM(s) Oral before breakfast  predniSONE   Tablet 5 milliGRAM(s) Oral daily  sevelamer carbonate 800 milliGRAM(s) Oral every 8 hours  sodium bicarbonate 1300 milliGRAM(s) Oral every 12 hours  tacrolimus ER Tablet (ENVARSUS XR) 3 milliGRAM(s) Oral <User Schedule>  trimethoprim   80 mG/sulfamethoxazole 400 mG 1 Tablet(s) Oral daily  valGANciclovir 450 milliGRAM(s) Oral <User Schedule>    PRN Inpatient Medications  dextrose Oral Gel 15 Gram(s) Oral once PRN  senna 2 Tablet(s) Oral at bedtime PRN  traMADol 25 milliGRAM(s) Oral every 4 hours PRN      REVIEW OF SYSTEMS  --------------------------------------------------------------------------------  Gen: No fatigue, fevers/chills, weakness  Skin: No rashes  Head/Eyes/Ears/Mouth: No headache;No sore throat  Respiratory: No dyspnea, cough,   CV: No chest pain, PND, orthopnea  GI: No abdominal pain, diarrhea, constipation, nausea, vomiting  Transplant: No pain  : No increased frequency, dysuria, hematuria, nocturia  MSK: No joint pain/swelling; no back pain; no edema  Neuro: No dizziness/lightheadedness, weakness, seizures, numbness, tingling  Psych: No significant nervousness, anxiety, stress, depression    All other systems were reviewed and are negative, except as noted.    VITALS/PHYSICAL EXAM  --------------------------------------------------------------------------------  T(C): 36.8 (05-02-22 @ 11:00), Max: 37.1 (05-02-22 @ 01:00)  HR: 66 (05-02-22 @ 11:00) (64 - 69)  BP: 137/65 (05-02-22 @ 11:00) (107/56 - 137/65)  RR: 18 (05-02-22 @ 11:00) (18 - 18)  SpO2: 99% (05-02-22 @ 11:00) (98% - 99%)  Wt(kg): --        05-01-22 @ 07:01  -  05-02-22 @ 07:00  --------------------------------------------------------  IN: 960 mL / OUT: 670 mL / NET: 290 mL    05-02-22 @ 07:01  -  05-02-22 @ 11:15  --------------------------------------------------------  IN: 400 mL / OUT: 0 mL / NET: 400 mL      Physical Exam:  	Gen: NAD  	HEENT: PERRL, supple neck, clear oropharynx  	Pulm: CTA B/L  	CV: RRR, S1S2; no rub  	Back: No spinal or CVA tenderness; no sacral edema  	Abd: +BS, soft, nontender/nondistended                      Transplant: No tenderness, swelling  	: No suprapubic tenderness  	UE: Warm, FROM; no edema; no asterixis  	LE: Warm, FROM; no edema  	Neuro: No focal deficits  	Psych: Normal affect and mood  	Skin: Warm, without rashes      LABS/STUDIES  --------------------------------------------------------------------------------              5.5    11.54 >-----------<  168      [05-02-22 @ 08:18]              17.5     132  |  91  |  65  ----------------------------<  153      [05-02-22 @ 07:05]  4.7   |  25  |  3.95        Ca     8.0     [05-02-22 @ 07:05]      Mg     1.8     [05-02-22 @ 07:05]      Phos  4.7     [05-02-22 @ 07:05]    TPro  5.2  /  Alb  2.4  /  TBili  0.3  /  DBili  x   /  AST  47  /  ALT  33  /  AlkPhos  67  [05-02-22 @ 07:05]        CK 26      [05-02-22 @ 10:05]        [05-02-22 @ 10:05]    Creatinine Trend:  SCr 3.95 [05-02 @ 07:05]  SCr 3.23 [05-01 @ 07:11]  SCr 2.52 [04-30 @ 07:15]  SCr 3.68 [04-29 @ 06:25]  SCr 3.44 [04-28 @ 17:35]    Tacrolimus (), Serum: 6.5 ng/mL (05-01 @ 08:32)  Tacrolimus (), Serum: 7.4 ng/mL (04-30 @ 07:49)  Tacrolimus (), Serum: 7.2 ng/mL (04-29 @ 07:50)  Tacrolimus (), Serum: 8.7 ng/mL (04-27 @ 07:17)            Iron 55, TIBC 191, %sat 29      [02-04-22 @ 00:04]  Ferritin 1287      [02-04-22 @ 00:04]  PTH -- (Ca 9.2)      [02-05-22 @ 10:13]   294  HbA1c 6.0      [02-21-20 @ 08:53]  Lipid: chol 118, TG 54, HDL 59, LDL --      [06-27-21 @ 09:44]    HBsAb 43710.2      [04-21-22 @ 14:13]  HBsAg Nonreact      [04-21-22 @ 14:13]  HBcAb Reactive      [04-21-22 @ 14:13]  HCV 0.25, Nonreact      [04-21-22 @ 14:13]  HIV Nonreact      [04-21-22 @ 14:47]

## 2022-05-02 NOTE — PROGRESS NOTE ADULT - SUBJECTIVE AND OBJECTIVE BOX
Transplant Surgery - Multidisciplinary Rounds  --------------------------------------------------------------  DDRT Date: 4/21/22    HPI: 66M with history of HTN, HLD, DM (on insulin), ESRD (2/2 DM and HTN) on HD via permacath since 3yrs ago MWF, afib (on eliquis 2.5mg BID), hx of stroke (2019 2/2 afib), focal seizure (on depakote, keppra), hx of CABG (2020), hx of nonbleeding gastric/duodenal ulcer (EGD 2/9/22, on protonix)    s/p  DCD DDRT ( 1A/1V/1U stented) on 4/21/2022 with Thymoglobulin Induction   - Donor: Donor: 56 y/o M, KDPI 82%, PHS high risk, terminal Cr 1.34, CMV pos, EBV pos, HCV OPAL neg)   - Graft: post op renal doppler with  good perfusion; course c/b delayed graft function requiring HD inpatient and on discharge.  - A fib: resumed Eliquis; Plavix not restarted yet    He was discharged home post op on outpatient HD (last HD yesterday before discharge) now directly admitted after family called stating patient felt weak and family was not able take care of patient at home. Per son, it was very diffcult for the patient to climb the 6 stairs required to go inside the house. Son states that patient has had increased weakness for past few weeks since before the transplant and family had planned to get him into physical therapy; however he was offered the kidney transplant therefore the postponed the PT plans.     During this admission, H/H dropped and repeat US showed increase in size of abd collection. He is now s/p take back to OR w/ hematoma washout and repair of arterial anastomosis 5/2/22.     Interval Events:  -seen in PACU, vitals stable  -AOX3, has no complaints  -ALINA w/ bloody output 110cc total post op. H/H stable on post op labs: 7.4/22.3<----7.5/22.4    Potential Discharge date: pending clinical improvement.     Education:  Medications    Plan of care:  See Below    MEDICATIONS  (STANDING):  atorvastatin 40 milliGRAM(s) Oral at bedtime  chlorhexidine 2% Cloths 1 Application(s) Topical daily  dextrose 5%. 1000 milliLiter(s) (50 mL/Hr) IV Continuous <Continuous>  dextrose 5%. 1000 milliLiter(s) (100 mL/Hr) IV Continuous <Continuous>  dextrose 50% Injectable 12.5 Gram(s) IV Push once  dextrose 50% Injectable 25 Gram(s) IV Push once  dextrose 50% Injectable 25 Gram(s) IV Push once  diVALproex  milliGRAM(s) Oral <User Schedule>  diVALproex  milliGRAM(s) Oral <User Schedule>  fluconAZOLE IVPB 200 milliGRAM(s) IV Intermittent every 24 hours  glucagon  Injectable 1 milliGRAM(s) IntraMuscular once  insulin glargine Injectable (LANTUS) 4 Unit(s) SubCutaneous at bedtime  insulin lispro (ADMELOG) corrective regimen sliding scale   SubCutaneous three times a day before meals  insulin lispro (ADMELOG) corrective regimen sliding scale   SubCutaneous at bedtime  levETIRAcetam 250 milliGRAM(s) Oral every 12 hours  levothyroxine 50 MICROGram(s) Oral daily  mycophenolate mofetil 1000 milliGRAM(s) Oral <User Schedule>  pantoprazole    Tablet 40 milliGRAM(s) Oral before breakfast  predniSONE   Tablet 5 milliGRAM(s) Oral daily  sevelamer carbonate 800 milliGRAM(s) Oral every 8 hours  sodium bicarbonate 1300 milliGRAM(s) Oral every 12 hours  sodium chloride 0.9%. 1000 milliLiter(s) (50 mL/Hr) IV Continuous <Continuous>  trimethoprim   80 mG/sulfamethoxazole 400 mG 1 Tablet(s) Oral daily  valGANciclovir 450 milliGRAM(s) Oral <User Schedule>    MEDICATIONS  (PRN):  dextrose Oral Gel 15 Gram(s) Oral once PRN Blood Glucose LESS THAN 70 milliGRAM(s)/deciliter  HYDROmorphone  Injectable 0.2 milliGRAM(s) IV Push every 10 minutes PRN Moderate Pain (4 - 6)  HYDROmorphone  Injectable 0.4 milliGRAM(s) IV Push every 10 minutes PRN Severe Pain (7 - 10)      PAST MEDICAL & SURGICAL HISTORY:  Hypertension    Diabetes    Dyslipidemia    CAD (Coronary Artery Disease)  with Stents in 06/2009 and 6/2019, s/p off-pump C3L on 8/13/20    Hypothyroidism    CVA (cerebral vascular accident)  12/13/19 with residual bilateral weakness    Anemia    PEG (percutaneous endoscopic gastrostomy) status  removed July 2020    Intubation of airway performed without difficulty  Dec 2019  CVA c/b status epilepticus requiring intubation and PEG in 12/2019-    ESRD on dialysis  M/W/F    Seizures  after CVA, last seizure was last week 4/07/22    History of insertion of stent into coronary artery bypass graft  2009 and 2019    S/P CABG x 3  off pump C3L on 8/13/20        Vital Signs Last 24 Hrs  T(C): 36.3 (02 May 2022 22:00), Max: 37.2 (02 May 2022 09:00)  T(F): 97.3 (02 May 2022 22:00), Max: 99 (02 May 2022 09:00)  HR: 66 (02 May 2022 22:45) (62 - 69)  BP: 148/69 (02 May 2022 22:45) (107/56 - 182/74)  BP(mean): 99 (02 May 2022 22:45) (96 - 111)  RR: 16 (02 May 2022 22:45) (14 - 18)  SpO2: 98% (02 May 2022 22:45) (97% - 100%)    I&O's Summary    01 May 2022 07:01  -  02 May 2022 07:00  --------------------------------------------------------  IN: 960 mL / OUT: 670 mL / NET: 290 mL    02 May 2022 07:01  -  02 May 2022 23:02  --------------------------------------------------------  IN: 950 mL / OUT: 785 mL / NET: 165 mL                              7.4    17.19 )-----------( 170      ( 02 May 2022 22:05 )             22.3     05-02    133<L>  |  94<L>  |  71<H>  ----------------------------<  162<H>  4.8   |  24  |  3.89<H>    Ca    8.0<L>      02 May 2022 22:05  Phos  5.2     05-02  Mg     1.8     05-02    TPro  5.2<L>  /  Alb  2.4<L>  /  TBili  0.3  /  DBili  x   /  AST  47<H>  /  ALT  33  /  AlkPhos  67  05-02    Tacrolimus (), Serum: 5.3 ng/mL (05-02 @ 07:58)          Review of Systems: Gen: No weight changes, fatigue, fevers/chills, weakness  Skin: No rashes  Head/Eyes/Ears/Mouth: No headache; Normal hearing; Normal vision w/o blurriness; No sinus pain/discomfort, sore throat  Respiratory: No dyspnea, cough, wheezing, hemoptysis  CV: No chest pain, PND, orthopnea  GI: No abdominal pain, diarrhea, constipation, nausea, vomiting, melena, hematochezia  : No increased frequency, dysuria, hematuria, nocturia  MSK: No joint pain/swelling; no back pain; no edema  Neuro: No dizziness/lightheadedness, weakness, seizures, numbness, tingling  Heme: No easy bruising or bleeding  Endo: No heat/cold intolerance  Psych: No significant nervousness, anxiety, stress, depression  All other systems were reviewed and are negative, except as noted    Physical Exam: Constitutional: Well developed / well nourished  Eyes: Anicteric, PERRLA  ENMT: nc/at  Neck: Supple  Respiratory: CTA B/L  Cardiovascular: RRR  Gastrointestinal: Soft abdomen, NT, ND. Dressing c/d/i. ALINA w/ bloody output.    Genitourinary:  Voiding spontaneously  Extremities: SCD's in place and working bilaterally  Vascular: Palpable dp pulses bilaterally  Neurological: A&O x3  Musculoskeletal: Moving all extremities  Psychiatric: Responsive

## 2022-05-02 NOTE — PROGRESS NOTE ADULT - ASSESSMENT
66M with history of HTN, HLD, DM (on insulin), ESRD (2/2 DM and HTN) on HD via permacath since 3yrs ago MWF, afib (on eliquis 2.5mg BID), hx of stroke (2019 2/2 afib), focal seizure (on depakote, keppra), hx of CABG (2020), hx of nonbleeding gastric/duodenal ulcer (EGD 2/9/22, on protonix), s/p DCD KTX, came back due to weakness    s/p  DCD KTX ( 1A/1V/1U stented) on 4/21/2022 with Thymoglobulin Induction   - Donor: Donor: 58 y/o M, KDPI 82%, PHS high risk, terminal Cr 1.34, CMV pos, EBV pos, HCV OPAL neg)   - Graft: post op renal doppler with  good perfusion; course c/b delayed graft function requiring HD    - last HD 03/29/22, HD per transplant team   - Immunosuppressant per transplant team   - His regular schedule as outpatient is MWF  - Monitor BMP,  urine output    HTN:  -acceptable   -Monitor BP    Anemia:  monitor Hgb   blood transfusion as needed     CKD; MBD:  Calcium corrected is normal  monitor Po4, Ca      66M with history of HTN, HLD, DM (on insulin), ESRD (2/2 DM and HTN) on HD via permacath since 3yrs ago MWF, afib (on eliquis 2.5mg BID), hx of stroke (2019 2/2 afib), focal seizure (on depakote, keppra), hx of CABG (2020), hx of nonbleeding gastric/duodenal ulcer (EGD 2/9/22, on protonix), s/p DCD KTX, came back due to weakness    s/p  DCD KTX ( 1A/1V/1U stented) on 4/21/2022 with Thymoglobulin Induction   - Donor: Donor: 56 y/o M, KDPI 82%, PHS high risk, terminal Cr 1.34, CMV pos, EBV pos, HCV OPAL neg)   - Graft: post op renal doppler with  good perfusion; course c/b delayed graft function requiring HD    - last HD 03/29/22, HD per transplant team   - Immunosuppressant per transplant team   - His regular schedule as outpatient is MWF  - Monitor BMP,  urine output    HTN:  -acceptable   -Monitor BP    Anemia:  monitor Hgb   pending CT A/P  blood transfusion as per transplant team     CKD; MBD:  Calcium corrected is normal  monitor Po4, Ca

## 2022-05-02 NOTE — PROVIDER CONTACT NOTE (CRITICAL VALUE NOTIFICATION) - SITUATION
Pt. Hgb 6.4
HGB : 5.8 & HCT 19.8 ; however, pt has no access - pt is very hard stick (multiple staff on unit attempted to get IV access on pt)
Pt with H&H 6.0/18.6
Pt with H&H 5.5/17.5 with repeat CBC

## 2022-05-02 NOTE — PROGRESS NOTE ADULT - ASSESSMENT
66M with history of HTN, HLD, DM (on insulin), ESRD (2/2 DM and HTN) on HD via permacath since 3yrs ago MWF, afib (on eliquis 2.5mg BID), hx of stroke (2019 2/2 afib), focal seizure (on depakote, keppra), hx of CABG (2020), hx of nonbleeding gastric/duodenal ulcer (EGD 2/9/22, on protonix) s/p  DCD DDRT ( 1A/1V/1U stented) on 4/21/2022 with Thymoglobulin Induction He was discharged home 4/27 w/ outpatient HD (last HD 4/27 before discharge) now directly admitted for weakness, found to have new hematomas around kidney, repeat imaging showed increasing size of hematomas, now s/p OR take back for hematoma evacuation and repair of arterial anastomosis 5/2/22.     S/p DDRT w/ hematomas- s/p evacuation/repair of arterial anastomosis  -post op labs w/ stable H/H  -continue zosyn/fluc  -IS: Env on hold pending AM level, Continue MMF 1g BID, Pred d  -ppx: nystatin, bactrim, valcyte renally dosed  -IVF @ 50cc/hr  -NPO  -strict I/Os, ALINA, tucker  -daily AM labs including tac level  -PT in AM    HTN  -hold home BP meds    Diabetes  -FS AC/ HS  -Lispro/lantus    Afib  -Eliquis/ plavix on hold   -will restart coreg PRN    Seizures  -c/w home keppra   -c/w divalproex dose

## 2022-05-02 NOTE — PROGRESS NOTE ADULT - ASSESSMENT
66 yr old man with ESRD due to DM on HD since 2019 presenting for DDRT. Pt underwent DDRT on 4/21/22 with DGF and HD dependant presenting with increased lethargy and symptomatic severe anemia with hematomas around allograft. Primary Nephrologist Dr. Rey/Iliana.   Donor: 56 y/o M, KDPI 82%, PHS high risk, terminal Cr 1.34, CMV pos, EBV pos, HCV OPAL neg)     1. s/p DDRT on 4/21/22 -  Allograft function with DGF and HD dependant. Last HD done on 4/29/22.  Dialysis tentatively planned for  tomw.   2.Immunosuppression- s/p Thymo induction. On Envarsus adjust as per levels. target goal 8-10, Continue Cellcept 1 gm BID and Prednisone 5 mg po daily . Infection ppx with  Nystatin, bactrim, Valcyte.  3. Seizures- Continue Depakote and Keppra.  4.Hypertension-  Continue Amlodipine and Coreg 3.125 mg po bid   5.DM - On insulin. Continue and adjust as per BS levels.  6.Anemia - Acute drop in Hb noted 5.5 today. will transfuse 2 units of PRBC and obtain CT A/P today  . He is Hemodynamically stable   US allograft on admission showed collections c/w hematomas. Eliquis was discontinued . 2 Units PRBC transfused on 4/28/22. and 1 unit of PRBC on 4/29/ with HD Will keep hemoglobin > 8 given hx of CAD. . Avoid AC.

## 2022-05-02 NOTE — PROVIDER CONTACT NOTE (CRITICAL VALUE NOTIFICATION) - BACKGROUND
pt s/p DDRT discharged yesterday came back for weakness
Pt s/p DDRT, Hx ESRD with HD, CAD, Anemia, CVA
Pt s/o DDRT, HX: ESRD on HD, DM, CAA. Anemia
Pt. s/p DDRT discharged yesterday came back for weakness

## 2022-05-03 NOTE — PROGRESS NOTE ADULT - ASSESSMENT
66M with history of HTN, HLD, DM (on insulin), ESRD (2/2 DM and HTN) on HD via L permacath since 2019 MWF, AFib (on eliquis 2.5mg BID), hx of stroke (2019 2/2 afib), focal seizure (on depakote, keppra), hx of CABG (2020), hx of nonbleeding gastric/duodenal ulcer (EGD 2/9/22, on protonix) s/p DCD DDRT (1A/1V/1U stented) on 4/21/2022 with Thymoglobulin Induction c/b DGF requiring HD.  Re-admitted for anemia 2/2 perinephric hematoma s/p OR evacuation on 5/2    s/p R DCD DDRT c/b perinephric hematoma s/p OR evacuation (POD#1)  -h/h appropriate post-washout  -graft: DGF improving, off HD since 4/29, Cr plateau to high 3s.  Nephrology following closely.  restart Lasix 80IV BID  -continue Zosyn/Fluc for now (ID following)  -place abdominal binder  -d/c tucker  -PT/SCDs/Spirometry  -stop IVF  -ADAT    Immunosuppression  -Envarsus per level, MMF 1/1, Pred 5  -PPx: Nystatin, Bactrim, Valcyte (renal dosing)    DM  -on Lispro/pre-meal/LUCIA, adjust prn    AFib/HTN  -Eliquis/Plavix on HOLD, will restart as able  -will restart HTN regimen as able    Seizures  -continue home meds

## 2022-05-03 NOTE — CONSULT NOTE ADULT - SUBJECTIVE AND OBJECTIVE BOX
Patient is a 66y old  Male who presents with a chief complaint of Weakness (03 May 2022 13:30)      HPI:  66M with history of HTN, HLD, DM (on insulin), ESRD (2/2 DM and HTN) on HD via permacath since 3yrs ago MWF, afib (on eliquis 2.5mg BID), hx of stroke ( 2/2 afib), focal seizure (on depakote, keppra), hx of CABG (), hx of nonbleeding gastric/duodenal ulcer (EGD 22, on protonix)    s/p  DCD DDRT ( 1A/1V/1U stented) on 2022 with Thymoglobulin Induction   - Donor: Donor: 56 y/o M, KDPI 82%, PHS high risk, terminal Cr 1.34, CMV pos, EBV pos, HCV OPAL neg)   - Graft: post op renal doppler with  good perfusion; course c/b delayed graft function requiring HD inpatient and on discharge.  - A fib: resumed Eliquis; Plavix not restarted yet    He was discharged home yesterday w/ outpatient HD (last HD yesterday before discharge) now directly admitted after family called stating patient felt weak and family was not able take care of patient at home. Per son, it was very diffcult for the patient to climb the 6 stairs required to go inside the house. Son states that patient has had increased weakness for past few weeks since before the transplant and family had planned to get him into physical therapy; however he was offered the kidney transplant therefore the postponed the PT plans.     Patient currently AOX3, NAD. Sensory/motor intact. Denies fevers/chills. (2022 14:08)     prior hospital charts reviewed [  ]  primary team notes reviewed [  ]  other consultant notes reviewed [  ]    PAST MEDICAL & SURGICAL HISTORY:  Hypertension    Diabetes    Dyslipidemia    CAD (Coronary Artery Disease)  with Stents in 2009 and 2019, s/p off-pump C3L on 20    Hypothyroidism    CVA (cerebral vascular accident)  19 with residual bilateral weakness    Anemia    PEG (percutaneous endoscopic gastrostomy) status  removed 2020    Intubation of airway performed without difficulty  Dec 2019  CVA c/b status epilepticus requiring intubation and PEG in 2019-    ESRD on dialysis  M/W/F    Seizures  after CVA, last seizure was last week 22    History of insertion of stent into coronary artery bypass graft   and 2019    S/P CABG x 3  off pump C3L on 20        Allergies  No Known Allergies    ANTIMICROBIALS (past 90 days)  MEDICATIONS  (STANDING):  fluconAZOLE IVPB   100 mL/Hr IV Intermittent (22 @ 20:06)    nystatin    Suspension   [id number] Unit(s) Oral (22 @ 12:05)   [id number] Unit(s) Oral (22 @ 06:05)   [id number] Unit(s) Oral (22 @ 23:21)   [id number] Unit(s) Oral (22 @ 18:01)   [id number] Unit(s) Oral (22 @ 11:40)   [id number] Unit(s) Oral (22 @ 06:42)   [id number] Unit(s) Oral (22 @ 23:10)   [id number] Unit(s) Oral (22 @ 18:27)   [id number] Unit(s) Oral (22 @ 11:39)   [id number] Unit(s) Oral (22 @ 05:30)   [id number] Unit(s) Oral (22 @ 01:07)   [id number] Unit(s) Oral (22 @ 18:27)   [id number] Unit(s) Oral (22 @ 11:53)   [id number] Unit(s) Oral (22 @ 06:01)   [id number] Unit(s) Oral (22 @ 00:21)   [id number] Unit(s) Oral (22 @ 18:41)    piperacillin/tazobactam IVPB.   200 mL/Hr IV Intermittent (22 @ 00:31)    piperacillin/tazobactam IVPB..   25 mL/Hr IV Intermittent (22 @ 11:26)    trimethoprim   80 mG/sulfamethoxazole 400 mG   1 Tablet(s) Oral (22 @ 11:28)    trimethoprim   80 mG/sulfamethoxazole 400 mG   1 Tablet(s) Oral (22 @ 12:05)   1 Tablet(s) Oral (22 @ 11:41)   1 Tablet(s) Oral (22 @ 11:39)   1 Tablet(s) Oral (22 @ 11:53)    valGANciclovir   450 milliGRAM(s) Oral (22 @ 07:42)   450 milliGRAM(s) Oral (22 @ 08:30)      ANTIMICROBIALS:    fluconAZOLE IVPB 200 every 24 hours  piperacillin/tazobactam IVPB.. 3.375 every 12 hours  trimethoprim   80 mG/sulfamethoxazole 400 mG 1 daily  valGANciclovir 450 <User Schedule>    OTHER MEDS: MEDICATIONS  (STANDING):  amLODIPine   Tablet 10 once  atorvastatin 40 at bedtime  dextrose 50% Injectable 25 once  dextrose 50% Injectable 12.5 once  dextrose 50% Injectable 25 once  dextrose Oral Gel 15 once PRN  diVALproex  <User Schedule>  diVALproex  <User Schedule>  furosemide   Injectable 80 two times a day  glucagon  Injectable 1 once  insulin glargine Injectable (LANTUS) 6 at bedtime  insulin lispro (ADMELOG) corrective regimen sliding scale  three times a day before meals  insulin lispro (ADMELOG) corrective regimen sliding scale  at bedtime  levETIRAcetam 250 every 12 hours  levothyroxine 50 daily  mycophenolate mofetil 1000 <User Schedule>  pantoprazole    Tablet 40 before breakfast  polyethylene glycol 3350 17 daily  predniSONE   Tablet 5 daily  senna 2 at bedtime    SOCIAL HISTORY:   hx smoking  non-smoker    FAMILY HISTORY:  Family history of cancer of tongue (Father)  Parents are  . Father - at 80 years, tongue cancer was a smoker. Mother- at 71, had accident while crossing road. Siblings- 2 brothers and one sister.      REVIEW OF SYSTEMS  [  ] ROS unobtainable because:    [  ] All other systems negative except as noted below:	    Constitutional:  [ ] fever [ ] chills  [ ] weight loss  [ ] weakness  Skin:  [ ] rash [ ] phlebitis	  Eyes: [ ] icterus [ ] pain  [ ] discharge	  ENMT: [ ] sore throat  [ ] thrush [ ] ulcers [ ] exudates  Respiratory: [ ] dyspnea [ ] hemoptysis [ ] cough [ ] sputum	  Cardiovascular:  [ ] chest pain [ ] palpitations [ ] edema	  Gastrointestinal:  [ ] nausea [ ] vomiting [ ] diarrhea [ ] constipation [ ] pain	  Genitourinary:  [ ] dysuria [ ] frequency [ ] hematuria [ ] discharge [ ] flank pain  [ ] incontinence  Musculoskeletal:  [ ] myalgias [ ] arthralgias [ ] arthritis  [ ] back pain  Neurological:  [ ] headache [ ] seizures  [ ] confusion/altered mental status  Psychiatric:  [ ] anxiety [ ] depression	  Hematology/Lymphatics:  [ ] lymphadenopathy  Endocrine:  [ ] adrenal [ ] thyroid  Allergic/Immunologic:	 [ ] transplant [ ] seasonal    Vital Signs Last 24 Hrs  T(F): 98.1 (22 @ 12:53), Max: 99 (22 @ 09:00)  Vital Signs Last 24 Hrs  HR: 73 (22 @ 12:53) (62 - 73)  BP: 161/61 (22 @ 12:53) (147/67 - 182/74)  RR: 20 (22 @ 12:53)  SpO2: 99% (22 @ 12:53) (98% - 100%)  Wt(kg): --    PHYSICAL EXAMINATION:  General: Alert and Awake, NAD  HEENT: PERRL, EOMI, No subconjunctival hemorrhages, Oropharynx Clear, MMM  Neck: Supple, No TINY  Cardiac: RRR, No M/R/G  Resp: CTAB, No Wh/Rh/Ra  Abdomen: NBS, NT/ND, No HSM, No rigidity or guarding  MSK: No LE edema. No stigmata of IE. No evidence of phlebitis. No evidence of synovitis.  : No tucker  Skin: No rashes or lesions. Skin is warm and dry to the touch.   Neuro: Alert and Awake. CN 2-12 Grossly intact. Moves all four extremities spontaneously.  Psych: Calm, Pleasant, Cooperative                          8.1    14.50 )-----------( 182      ( 03 May 2022 06:47 )             24.2     05-03    131<L>  |  93<L>  |  71<H>  ----------------------------<  187<H>  4.8   |  23  |  3.88<H>    Ca    8.0<L>      03 May 2022 06:47  Phos  5.0     05-03  Mg     1.8     05-03    TPro  5.3<L>  /  Alb  2.3<L>  /  TBili  0.5  /  DBili  x   /  AST  33  /  ALT  23  /  AlkPhos  67  -    MICROBIOLOGY:  Culture - Body Fluid with Gram Stain (collected 03 May 2022 02:12)  Source: .Body Fluid BLOOD CLOT  Gram Stain (03 May 2022 06:37):    No polymorphonuclear cells seen per low power field    No organisms seen per oil power field                    RADIOLOGY:    <The imaging below has been reviewed and visualized by me independently. Findings as detailed in report below>     Patient is a 66y old  Male who presents with a chief complaint of Weakness (03 May 2022 13:30)    HPI:    66 year old male PMH ESRD (2/2 DM and HTN) s/p DCD DDRT (CMV +/+, EBV +/+) on , HTN, HLD, DM (on insulin), A-fib (on eliquis 2.5mg BID), hx of stroke ( afib), focal seizure (on depakote, keppra), hx of CABG (), hx of nonbleeding gastric/duodenal ulcer (EGD 22, on protonix) who presented to [facility] as his family was not able take care of patient at home. Per son, it was very diffcult for the patient to climb the 6 stairs required to go inside the house. Son states that patient has had increased weakness for past few weeks since before the transplant and family had planned to get him into physical therapy; however he was offered the kidney transplant therefore the postponed the PT plans.     CT A/P () with Large right extraperitoneal hematoma, displacing the transplant kidney medially and displacing bowel  to the left. On  s/p OR hematoma evaluation.      prior hospital charts reviewed [  ]  primary team notes reviewed [ x ]  other consultant notes reviewed [x  ]    PAST MEDICAL & SURGICAL HISTORY:  Hypertension    Diabetes    Dyslipidemia    CAD (Coronary Artery Disease)  with Stents in 2009 and 2019, s/p off-pump C3L on 20    Hypothyroidism    CVA (cerebral vascular accident)  19 with residual bilateral weakness    Anemia    PEG (percutaneous endoscopic gastrostomy) status  removed 2020    Intubation of airway performed without difficulty  Dec 2019  CVA c/b status epilepticus requiring intubation and PEG in 2019-    ESRD on dialysis  M/W/F    Seizures  after CVA, last seizure was last week 22    History of insertion of stent into coronary artery bypass graft   and     S/P CABG x 3  off pump C3L on 20        Allergies  No Known Allergies    ANTIMICROBIALS (past 90 days)  MEDICATIONS  (STANDING):  fluconAZOLE IVPB   100 mL/Hr IV Intermittent (22 @ 20:06)    nystatin    Suspension   [id number] Unit(s) Oral (22 @ 12:05)   [id number] Unit(s) Oral (22 @ 06:05)   [id number] Unit(s) Oral (22 @ 23:21)   [id number] Unit(s) Oral (22 @ 18:01)   [id number] Unit(s) Oral (22 @ 11:40)   [id number] Unit(s) Oral (22 @ 06:42)   [id number] Unit(s) Oral (22 @ 23:10)   [id number] Unit(s) Oral (22 @ 18:27)   [id number] Unit(s) Oral (22 @ 11:39)   [id number] Unit(s) Oral (22 @ 05:30)   [id number] Unit(s) Oral (22 @ 01:07)   [id number] Unit(s) Oral (22 @ 18:27)   [id number] Unit(s) Oral (22 @ 11:53)   [id number] Unit(s) Oral (22 @ 06:01)   [id number] Unit(s) Oral (22 @ 00:21)   [id number] Unit(s) Oral (22 @ 18:41)    piperacillin/tazobactam IVPB.   200 mL/Hr IV Intermittent (22 @ 00:31)    piperacillin/tazobactam IVPB..   25 mL/Hr IV Intermittent (22 @ 11:26)    trimethoprim   80 mG/sulfamethoxazole 400 mG   1 Tablet(s) Oral (22 @ 11:28)    trimethoprim   80 mG/sulfamethoxazole 400 mG   1 Tablet(s) Oral (22 @ 12:05)   1 Tablet(s) Oral (22 @ 11:41)   1 Tablet(s) Oral (22 @ 11:39)   1 Tablet(s) Oral (22 @ 11:53)    valGANciclovir   450 milliGRAM(s) Oral (22 @ 07:42)   450 milliGRAM(s) Oral (22 @ 08:30)      ANTIMICROBIALS:    fluconAZOLE IVPB 200 every 24 hours  piperacillin/tazobactam IVPB.. 3.375 every 12 hours  trimethoprim   80 mG/sulfamethoxazole 400 mG 1 daily  valGANciclovir 450 <User Schedule>    OTHER MEDS: MEDICATIONS  (STANDING):  amLODIPine   Tablet 10 once  atorvastatin 40 at bedtime  dextrose 50% Injectable 25 once  dextrose 50% Injectable 12.5 once  dextrose 50% Injectable 25 once  dextrose Oral Gel 15 once PRN  diVALproex  <User Schedule>  diVALproex  <User Schedule>  furosemide   Injectable 80 two times a day  glucagon  Injectable 1 once  insulin glargine Injectable (LANTUS) 6 at bedtime  insulin lispro (ADMELOG) corrective regimen sliding scale  three times a day before meals  insulin lispro (ADMELOG) corrective regimen sliding scale  at bedtime  levETIRAcetam 250 every 12 hours  levothyroxine 50 daily  mycophenolate mofetil 1000 <User Schedule>  pantoprazole    Tablet 40 before breakfast  polyethylene glycol 3350 17 daily  predniSONE   Tablet 5 daily  senna 2 at bedtime    SOCIAL HISTORY: no smoking or etoh use. no drug use.     FAMILY HISTORY:  Family history of cancer of tongue (Father)  Parents are  . Father - at 80 years, tongue cancer was a smoker. Mother- at 71, had accident while crossing road. Siblings- 2 brothers and one sister.      REVIEW OF SYSTEMS  [  ] ROS unobtainable because:    [ x ] All other systems negative except as noted below:	    Constitutional:  [ ] fever [ ] chills  [ ] weight loss  [ ] weakness  Skin:  [ ] rash [ ] phlebitis	  Eyes: [ ] icterus [ ] pain  [ ] discharge	  ENMT: [ ] sore throat  [ ] thrush [ ] ulcers [ ] exudates  Respiratory: [ ] dyspnea [ ] hemoptysis [ ] cough [ ] sputum	  Cardiovascular:  [ ] chest pain [ ] palpitations [ ] edema	  Gastrointestinal:  [ ] nausea [ ] vomiting [ ] diarrhea [ ] constipation [x ] pain	  Genitourinary:  [ ] dysuria [ ] frequency [ ] hematuria [ ] discharge [ ] flank pain  [ ] incontinence  Musculoskeletal:  [ ] myalgias [ ] arthralgias [ ] arthritis  [ ] back pain  Neurological:  [ ] headache [ ] seizures  [ ] confusion/altered mental status  Psychiatric:  [ ] anxiety [ ] depression	  Hematology/Lymphatics:  [ ] lymphadenopathy  Endocrine:  [ ] adrenal [ ] thyroid  Allergic/Immunologic:	 [ ] transplant [ ] seasonal    Vital Signs Last 24 Hrs  T(F): 98.1 (22 @ 12:53), Max: 99 (22 @ 09:00)  Vital Signs Last 24 Hrs  HR: 73 (22 @ 12:53) (62 - 73)  BP: 161/61 (22 @ 12:53) (147/67 - 182/74)  RR: 20 (22 @ 12:53)  SpO2: 99% (22 @ 12:53) (98% - 100%)  Wt(kg): --    PHYSICAL EXAMINATION:  General: Alert and Awake, NAD  HEENT: PERRL, EOMI  Neck: Supple, No TINY  Cardiac: RRR, No M/R/G  Resp: CTAB, No Wh/Rh/Ra  Abdomen: NBS, Dressing over abdomen with ALINA drain with serosanguinous drainage, Abdomen with diffuse mild-moderate tenderness to palpation  MSK: No LE edema. No stigmata of IE. No evidence of phlebitis. No evidence of synovitis.  : No tucker  Skin: No rashes or lesions. Skin is warm and dry to the touch.   Neuro: Alert and Awake. CN 2-12 Grossly intact. Moves all four extremities spontaneously.  Psych: Calm, Pleasant, Cooperative                          8.1    14.50 )-----------( 182      ( 03 May 2022 06:47 )             24.2     05-    131<L>  |  93<L>  |  71<H>  ----------------------------<  187<H>  4.8   |  23  |  3.88<H>    Ca    8.0<L>      03 May 2022 06:47  Phos  5.0     05-  Mg     1.8     -    TPro  5.3<L>  /  Alb  2.3<L>  /  TBili  0.5  /  DBili  x   /  AST  33  /  ALT  23  /  AlkPhos  67  -    MICROBIOLOGY:    Culture - Body Fluid with Gram Stain (collected 03 May 2022 02:12)  Source: .Body Fluid BLOOD CLOT  Gram Stain (03 May 2022 06:37):    No polymorphonuclear cells seen per low power field    No organisms seen per oil power field    RADIOLOGY:    <The imaging below has been reviewed and visualized by me independently. Findings as detailed in report below>    < from: CT Abdomen and Pelvis No Cont (22 @ 12:37) >  IMPRESSION:  Right lower quadrant transplant kidney.   Large right extraperitoneal   hematoma, displacing the transplant kidney medially and displacing bowel   to the left. An additional higher attenuation component is seen within   the right pelvis, which may represent more recent bleeding.    < end of copied text >

## 2022-05-03 NOTE — PROGRESS NOTE ADULT - ASSESSMENT
66 yr old man with ESRD due to DM on HD since 2019 presenting for DDRT. Pt underwent DDRT on 4/21/22 with DGF and HD dependant presenting with increased lethargy and symptomatic severe anemia with hematomas around allograft. Primary Nephrologist Dr. Rey/Iliana.   Donor: 56 y/o M, KDPI 82%, PHS high risk, terminal Cr 1.34, CMV pos, EBV pos, HCV OPAL neg)     1. s/p DDRT on 4/21/22 -  Allograft function with DGF and HD dependant. Last HD done on 4/29/22. Will start Lasix 80 mg IV bid.  Cr remains at the same range . No urgent indication for dialysis today, will reassess daily.    2.Immunosuppression- s/p Thymo induction. On Envarsus adjust as per levels. target goal 8-10, Continue Cellcept 1 gm BID and Prednisone 5 mg po daily .Infection ppx with  Nystatin, bactrim, Valcyte.  3. Seizures- Continue Depakote and Keppra.  4.Hypertension-  Controlled on  Amlodipine and Coreg 3.125 mg po bid   5.DM - On insulin. 6.Anemia - CT A/P revealed a large hematoma.  s/p hematoma evacuation and repair of arterial anastomosis on 5/2. received  2 units of PRBC yesterday. H/H is stable today   Eliquis on hold discontinued . Will keep hemoglobin > 8 given hx of CAD.     66 yr old man with ESRD due to DM on HD since 2019 presenting for DDRT. Pt underwent DDRT on 4/21/22 with DGF and HD dependant presenting with increased lethargy and symptomatic severe anemia with hematomas around allograft. Primary Nephrologist Dr. Rey/Iliana.   Donor: 58 y/o M, KDPI 82%, PHS high risk, terminal Cr 1.34, CMV pos, EBV pos, HCV OPAL neg)     1. s/p DDRT on 4/21/22 -  Allograft function with DGF and HD dependant. Last HD done on 4/29/22. Will start Lasix 80 mg IV bid.  Cr remains at the same range . No urgent indication for dialysis today, will reassess daily.    2.Immunosuppression- s/p Thymo induction. On Envarsus adjust as per levels. target goal 8-10, Continue Cellcept 1 gm BID and Prednisone 5 mg po daily .Infection ppx with  Nystatin, bactrim, Valcyte.  3. Seizures- Continue Depakote and Keppra.  4.Hypertension-  Controlled on  Amlodipine and Coreg 3.125 mg po bid   5.DM - On insulin. 6.Anemia - CT A/P revealed a large hematoma.  s/p hematoma evacuation and repair of arterial anastomosis on 5/2. received  2 units of PRBC yesterday. H/H is stable today   Eliquis on hold  . Will keep hemoglobin > 8 given hx of CAD.

## 2022-05-03 NOTE — PROGRESS NOTE ADULT - SUBJECTIVE AND OBJECTIVE BOX
[facility] [place] [facility]   MD NINA MASON MD, DO SADIE RAMIREZ NP    TEL:  OFFICE: [phone]    From 5pm-7am Answering Service [phone]    -- RENAL FOLLOW UP NOTE ---Date of Service 05-03-22 @ 13:31    Patient is a 66y old  Male who presents with a chief complaint of Weakness (03 May 2022 13:00)      Patient seen and examined at bedside. No chest pain/sob    VITALS:  T(F): 98.1 (05-03-22 @ 12:53), Max: 98.6 (05-02-22 @ 15:12)  HR: 73 (05-03-22 @ 12:53)  BP: 161/61 (05-03-22 @ 12:53)  RR: 20 (05-03-22 @ 12:53)  SpO2: 99% (05-03-22 @ 12:53)  Wt(kg): --    05-02 @ 07:01  -  05-03 @ 07:00  --------------------------------------------------------  IN: 1450 mL / OUT: 1485 mL / NET: -35 mL    05-03 @ 07:01  -  05-03 @ 13:31  --------------------------------------------------------  IN: 580 mL / OUT: 810 mL / NET: -230 mL      Height (cm): 177.8 (05-02 @ 17:09)  Weight (kg): 68.9 (05-02 @ 17:09)  BMI (kg/m2): 21.8 (05-02 @ 17:09)  BSA (m2): 1.86 (05-02 @ 17:09)    PHYSICAL EXAM:  Constitutional: NAD  Neck: No JVD  Respiratory: CTAB, no wheezes, rales or rhonchi  Cardiovascular: S1, S2, RRR  Gastrointestinal: BS+, soft, NT/ND  Extremities: + peripheral edema    Hospital Medications:   MEDICATIONS  (STANDING):  amLODIPine   Tablet 10 milliGRAM(s) Oral once  atorvastatin 40 milliGRAM(s) Oral at bedtime  chlorhexidine 2% Cloths 1 Application(s) Topical daily  dextrose 5%. 1000 milliLiter(s) (50 mL/Hr) IV Continuous <Continuous>  dextrose 5%. 1000 milliLiter(s) (100 mL/Hr) IV Continuous <Continuous>  dextrose 50% Injectable 25 Gram(s) IV Push once  dextrose 50% Injectable 12.5 Gram(s) IV Push once  dextrose 50% Injectable 25 Gram(s) IV Push once  diVALproex  milliGRAM(s) Oral <User Schedule>  diVALproex  milliGRAM(s) Oral <User Schedule>  fluconAZOLE IVPB 200 milliGRAM(s) IV Intermittent every 24 hours  furosemide   Injectable 80 milliGRAM(s) IV Push two times a day  glucagon  Injectable 1 milliGRAM(s) IntraMuscular once  insulin glargine Injectable (LANTUS) 4 Unit(s) SubCutaneous at bedtime  insulin lispro (ADMELOG) corrective regimen sliding scale   SubCutaneous at bedtime  insulin lispro (ADMELOG) corrective regimen sliding scale   SubCutaneous three times a day before meals  levETIRAcetam 250 milliGRAM(s) Oral every 12 hours  levothyroxine 50 MICROGram(s) Oral daily  mycophenolate mofetil 1000 milliGRAM(s) Oral <User Schedule>  pantoprazole    Tablet 40 milliGRAM(s) Oral before breakfast  piperacillin/tazobactam IVPB.. 3.375 Gram(s) IV Intermittent every 12 hours  predniSONE   Tablet 5 milliGRAM(s) Oral daily  sevelamer carbonate 800 milliGRAM(s) Oral three times a day with meals  sodium bicarbonate 1300 milliGRAM(s) Oral every 12 hours  trimethoprim   80 mG/sulfamethoxazole 400 mG 1 Tablet(s) Oral daily  valGANciclovir 450 milliGRAM(s) Oral <User Schedule>    Tacrolimus (), Serum: 8.7 ng/mL (05-03 @ 06:48)    LABS:  05-03    131<L>  |  93<L>  |  71<H>  ----------------------------<  187<H>  4.8   |  23  |  3.88<H>    Ca    8.0<L>      03 May 2022 06:47  Phos  5.0     05-03  Mg     1.8     05-03    TPro  5.3<L>  /  Alb  2.3<L>  /  TBili  0.5  /  DBili      /  AST  33  /  ALT  23  /  AlkPhos  67  05-03    Creatinine Trend: 3.88 <--, 3.89 <--, 3.99 <--, 3.95 <--, 3.23 <--, 2.52 <--, 3.68 <--, 3.44 <--, 4.20 <--    Phosphorus Level, Serum: 5.0 mg/dL (05-03 @ 06:47)  Albumin, Serum: 2.3 g/dL (05-03 @ 06:47)  Phosphorus Level, Serum: 5.2 mg/dL (05-02 @ 22:05)  Phosphorus Level, Serum: 5.5 mg/dL (05-02 @ 19:28)                              8.1    14.50 )-----------( 182      ( 03 May 2022 06:47 )             24.2     Urine Studies:      Iron 17, TIBC 171, %sat 10      [05-02-22 @ 13:03]  Ferritin 1505      [05-02-22 @ 13:05]  PTH -- (Ca 9.2)      [02-05-22 @ 10:13]   294  HbA1c 6.0      [02-21-20 @ 08:53]  Lipid: chol 118, TG 54, HDL 59, LDL --      [06-27-21 @ 09:44]    HBsAb 10669.2      [04-21-22 @ 14:13]  HBsAg Nonreact      [04-21-22 @ 14:13]  HBcAb Reactive      [04-21-22 @ 14:13]  HCV 0.25, Nonreact      [04-21-22 @ 14:13]  HIV Nonreact      [04-21-22 @ 14:47]      RADIOLOGY & ADDITIONAL STUDIES:

## 2022-05-03 NOTE — PROGRESS NOTE ADULT - SUBJECTIVE AND OBJECTIVE BOX
Transplant Surgery - Multidisciplinary Rounds  --------------------------------------------------------------  R DCD DDRT 4/21/22    HPI: 66M with history of HTN, HLD, DM (on insulin), ESRD (2/2 DM and HTN) on HD via permacath since 3yrs ago MWF, Afib (on Eliquis 2.5mg BID), hx of stroke (2019 2/2 afib), focal seizure (on depakote, keppra), hx of CABG (2020), hx of nonbleeding gastric/duodenal ulcer (EGD 2/9/22, on protonix)    s/p R DCD DDRT (1A/1V/1U stented) on 4/21/2022 with Thymoglobulin Induction   - Donor: 56 y/o M, KDPI 82%, PHS high risk, terminal Cr 1.34, CMV pos, EBV pos, HCV OPAL neg)   - Graft: post op renal doppler with  good perfusion; course c/b delayed graft function requiring HD inpatient and on discharge.  - A fib: resumed Eliquis; Plavix not restarted yet    -Readmitted on 4/28 with weakness, anemia  -s/p OR evacuation of hematoma and repair of bleeding by arterial anastomosis, graft bx on 5/2    Interval Events:  -POD#1 s/p OR hematoma evacuation  -h/h stable, no signs of bleeding at this time  -c/o generalized weakness with LE/scrotal edema  -graft: improving DGF, last HD on 4/29. UO improving daily  -rest of VSS; Afebrile on Zosyn/Fluc (started post-op)      Potential Discharge date: pending clinical improvement.     Education:  Medications    Plan of care:  See Below      MEDICATIONS  (STANDING):  amLODIPine   Tablet 10 milliGRAM(s) Oral once  atorvastatin 40 milliGRAM(s) Oral at bedtime  chlorhexidine 2% Cloths 1 Application(s) Topical daily  dextrose 5%. 1000 milliLiter(s) (50 mL/Hr) IV Continuous <Continuous>  dextrose 5%. 1000 milliLiter(s) (100 mL/Hr) IV Continuous <Continuous>  dextrose 50% Injectable 25 Gram(s) IV Push once  dextrose 50% Injectable 12.5 Gram(s) IV Push once  dextrose 50% Injectable 25 Gram(s) IV Push once  diVALproex  milliGRAM(s) Oral <User Schedule>  diVALproex  milliGRAM(s) Oral <User Schedule>  fluconAZOLE IVPB 200 milliGRAM(s) IV Intermittent every 24 hours  furosemide   Injectable 80 milliGRAM(s) IV Push two times a day  glucagon  Injectable 1 milliGRAM(s) IntraMuscular once  insulin glargine Injectable (LANTUS) 4 Unit(s) SubCutaneous at bedtime  insulin lispro (ADMELOG) corrective regimen sliding scale   SubCutaneous at bedtime  insulin lispro (ADMELOG) corrective regimen sliding scale   SubCutaneous three times a day before meals  levETIRAcetam 250 milliGRAM(s) Oral every 12 hours  levothyroxine 50 MICROGram(s) Oral daily  mycophenolate mofetil 1000 milliGRAM(s) Oral <User Schedule>  pantoprazole    Tablet 40 milliGRAM(s) Oral before breakfast  piperacillin/tazobactam IVPB.. 3.375 Gram(s) IV Intermittent every 12 hours  predniSONE   Tablet 5 milliGRAM(s) Oral daily  sevelamer carbonate 800 milliGRAM(s) Oral three times a day with meals  sodium bicarbonate 1300 milliGRAM(s) Oral every 12 hours  trimethoprim   80 mG/sulfamethoxazole 400 mG 1 Tablet(s) Oral daily  valGANciclovir 450 milliGRAM(s) Oral <User Schedule>    MEDICATIONS  (PRN):  dextrose Oral Gel 15 Gram(s) Oral once PRN Blood Glucose LESS THAN 70 milliGRAM(s)/deciliter        Vital Signs Last 24 Hrs  T(C): 36.7 (03 May 2022 12:53), Max: 37 (02 May 2022 15:12)  T(F): 98.1 (03 May 2022 12:53), Max: 98.6 (02 May 2022 15:12)  HR: 73 (03 May 2022 12:53) (62 - 73)  BP: 161/61 (03 May 2022 12:53) (147/67 - 182/74)  BP(mean): 96 (02 May 2022 23:42) (96 - 111)  RR: 20 (03 May 2022 12:53) (14 - 20)  SpO2: 99% (03 May 2022 12:53) (98% - 100%)    I&O's Summary    02 May 2022 07:01  -  03 May 2022 07:00  --------------------------------------------------------  IN: 1450 mL / OUT: 1485 mL / NET: -35 mL    03 May 2022 07:01  -  03 May 2022 13:09  --------------------------------------------------------  IN: 480 mL / OUT: 660 mL / NET: -180 mL                              8.1    14.50 )-----------( 182      ( 03 May 2022 06:47 )             24.2     05-03    131<L>  |  93<L>  |  71<H>  ----------------------------<  187<H>  4.8   |  23  |  3.88<H>    Ca    8.0<L>      03 May 2022 06:47  Phos  5.0     05-03  Mg     1.8     05-03    TPro  5.3<L>  /  Alb  2.3<L>  /  TBili  0.5  /  DBili  x   /  AST  33  /  ALT  23  /  AlkPhos  67  05-03    Tacrolimus (), Serum: 8.7 ng/mL (05-03 @ 06:48)        Culture - Body Fluid with Gram Stain (collected 05-03-22 @ 02:12)  Source: .Body Fluid BLOOD CLOT  Gram Stain (05-03-22 @ 06:37):    No polymorphonuclear cells seen per low power field    No organisms seen per oil power field                Review of Systems: Gen: No weight changes, fatigue, fevers/chills, +weakness  Skin: No rashes  Head/Eyes/Ears/Mouth: No headache; Normal hearing; Normal vision w/o blurriness; No sinus pain/discomfort, sore throat  Respiratory: No dyspnea, cough, wheezing, hemoptysis  CV: No chest pain, PND, orthopnea  GI: mild abdominal pain, no diarrhea, constipation, nausea, vomiting, melena, hematochezia  : No increased frequency, dysuria, hematuria, nocturia  MSK: No joint pain/swelling; no back pain; no edema  Neuro: No dizziness/lightheadedness, weakness, seizures, numbness, tingling  Heme: No easy bruising or bleeding  Endo: No heat/cold intolerance  Psych: No significant nervousness, anxiety, stress, depression  All other systems were reviewed and are negative, except as noted    Physical Exam:   Constitutional: Well developed / well nourished  Eyes: Anicteric, PERRLA  ENMT: nc/at  Neck: Supple  Respiratory: CTA B/L  Cardiovascular: RRR  Gastrointestinal: Soft abdomen, appropriate incisional TTP. pressure dressing c/d/i. ALINA x1 ss 160.  Genitourinary:  tucker with clear urine  Extremities: SCD's in place and working bilaterally. +b/l LE 2+ pitting edema. +scrotal edema  Vascular: Palpable dp pulses bilaterally  Neurological: A&O x3  Musculoskeletal: Moving all extremities, generalized weakness  Psychiatric: Responsive

## 2022-05-03 NOTE — PROGRESS NOTE ADULT - ASSESSMENT
66M with history of HTN, HLD, DM (on insulin), ESRD (2/2 DM and HTN) on HD via permacath since 3yrs ago MWF, afib (on eliquis 2.5mg BID), hx of stroke (2019 2/2 afib), focal seizure (on depakote, keppra), hx of CABG (2020), hx of nonbleeding gastric/duodenal ulcer (EGD 2/9/22, on protonix), s/p DCD KTX, came back due to weakness    s/p  DCD KTX ( 1A/1V/1U stented) on 4/21/2022 with Thymoglobulin Induction   - Donor: Donor: 58 y/o M, KDPI 82%, PHS high risk, terminal Cr 1.34, CMV pos, EBV pos, HCV OPAL neg)   - Graft: post op renal doppler with  good perfusion; course c/b delayed graft function requiring HD    - last HD 03/29/22, HD per transplant team   - on Lasix 80 mg IV bid.05/03/22  - Immunosuppressant per transplant team   - His regular schedule as outpatient is MWF  - Monitor BMP,  urine output    HTN:  -suboptimal   -Monitor BP    Anemia:  monitor Hgb   CT shows  Large right extraperitoneal hematoma, displacing the transplant kidney medially and displacing bowel   to the left. An additional higher attenuation component is seen within   the right pelvis, which may represent more recent bleeding.  blood transfusion as per transplant team     CKD; MBD:  Calcium corrected is normal  monitor Po4, Ca      66M with history of HTN, HLD, DM (on insulin), ESRD (2/2 DM and HTN) on HD via permacath since 3yrs ago MWF, afib (on eliquis 2.5mg BID), hx of stroke (2019 2/2 afib), focal seizure (on depakote, keppra), hx of CABG (2020), hx of nonbleeding gastric/duodenal ulcer (EGD 2/9/22, on protonix), s/p DCD KTX, came back due to weakness    s/p  DCD KTX ( 1A/1V/1U stented) on 4/21/2022 with Thymoglobulin Induction   - Donor: Donor: 56 y/o M, KDPI 82%, PHS high risk, terminal Cr 1.34, CMV pos, EBV pos, HCV OPAL neg)   - Graft: post op renal doppler with  good perfusion; course c/b delayed graft function requiring HD    - last HD 03/29/22, HD per transplant team   - on Lasix 80 mg IV bid.05/03/22  - Immunosuppressant per transplant team   - (CMV +/+, EBV +/+)   -Continue Valcyte 450 mg M/W/F  -Continue Bactrim for PCP PPx    - His regular schedule as outpatient is MWF Held HD today. Kidney function improving.   - Monitor BMP,  urine output    HTN:  -suboptimal   -Monitor BP    Anemia:  monitor Hgb   CT shows  Large right extraperitoneal hematoma, displacing the transplant kidney medially and displacing bowel   to the left. An additional higher attenuation component is seen within   the right pelvis, which may represent more recent bleeding.  blood transfusion as per transplant team     CKD; MBD:  Calcium corrected is normal  monitor Po4, Ca

## 2022-05-04 NOTE — DISCHARGE NOTE PROVIDER - NSDCMRMEDTOKEN_GEN_ALL_CORE_FT
amLODIPine 10 mg oral tablet: 1 tab(s) orally once a day  apixaban 2.5 mg oral tablet: 1 tab(s) orally 2 times a day  atorvastatin 40 mg oral tablet: 1 tab(s) orally once a day (at bedtime)  carvedilol 3.125 mg oral tablet: 1 tab(s) orally every 12 hours  clopidogrel 75 mg oral tablet: 1 tab(s) orally once a day   divalproex sodium 250 mg oral delayed release tablet: 1 tab(s) orally once a day  divalproex sodium 500 mg oral delayed release tablet: 1 tab(s) orally 2 times a day  furosemide 80 mg oral tablet: 1 tab(s) orally 2 times a day  HumaLOG 100 units/mL injectable solution: 7 unit(s) injectable 3 times a day  insulin glargine 100 units/mL subcutaneous solution: 7 unit(s) subcutaneous once a day (at bedtime)  levETIRAcetam 250 mg oral tablet: 1 tab(s) orally 2 times a day and an extra dose of 125mg dialysis on dialysis days  levETIRAcetam 250 mg oral tablet: 1 tab(s) orally 2 times a day  levothyroxine 50 mcg (0.05 mg) oral tablet: 1 tab(s) orally once a day  mycophenolate mofetil 250 mg oral capsule: 1 gram(s) orally 2 times a day  nystatin 100,000 units/mL oral suspension: 5 milliliter(s) orally 4 times a day  pantoprazole 40 mg oral delayed release tablet: 1 tab(s) orally once a day (before a meal)  Plavix 75 mg oral tablet: 1 tab(s) orally once a day  predniSONE: 5 milligram(s) orally once a day  senna oral tablet: 2 tab(s) orally once a day (at bedtime)  sevelamer hydrochloride 800 mg oral tablet: 1 tab(s) orally 3 times a day  sodium bicarbonate 650 mg oral tablet: 2 tab(s) orally every 12 hours  sulfamethoxazole-trimethoprim 400 mg-80 mg oral tablet: 1 tab(s) orally once a day  tacrolimus 1 mg oral tablet, extended release: 2 tab(s) orally once a day  valGANciclovir 450 mg oral tablet: 1 tab(s) orally 3 times a week   apixaban 2.5 mg oral tablet: 1 tab(s) orally 2 times a day  atorvastatin 40 mg oral tablet: 1 tab(s) orally once a day (at bedtime)  CellCept 500 mg oral tablet: 2 tab(s) orally 2 times a day  Coreg 25 mg oral tablet: 1 tab(s) orally every 12 hours  divalproex sodium 250 mg oral delayed release tablet: 1 tab(s) orally once a day  divalproex sodium 500 mg oral delayed release tablet: 1 tab(s) orally 2 times a day  fluconazole 200 mg oral tablet: 1 tab(s) orally once a day  HumaLOG 100 units/mL injectable solution: 4 unit(s) injectable 3 times a day  insulin glargine 100 units/mL subcutaneous solution: 6 unit(s) subcutaneous once a day (at bedtime)  levETIRAcetam 250 mg oral tablet: 1 tab(s) orally 2 times a day and an extra dose of 125mg dialysis on dialysis days  levETIRAcetam 250 mg oral tablet: 1 tab(s) orally 2 times a day  levothyroxine 50 mcg (0.05 mg) oral tablet: 1 tab(s) orally once a day  magnesium oxide 400 mg oral tablet: 1 tab(s) orally 3 times a day (with meals)  pantoprazole 40 mg oral delayed release tablet: 1 tab(s) orally once a day (before a meal)  predniSONE: 5 milligram(s) orally once a day  senna oral tablet: 2 tab(s) orally once a day (at bedtime)  sulfamethoxazole-trimethoprim 400 mg-80 mg oral tablet: 1 tab(s) orally once a day  tacrolimus 1 mg oral tablet, extended release: 2 tab(s) orally once a day  valGANciclovir 450 mg oral tablet: 1 tab(s) orally 3 times a week   apixaban 2.5 mg oral tablet: 1 tab(s) orally 2 times a day  atorvastatin 40 mg oral tablet: 1 tab(s) orally once a day (at bedtime)  CellCept 500 mg oral tablet: 2 tab(s) orally 2 times a day  Coreg 25 mg oral tablet: 1 tab(s) orally every 12 hours  divalproex sodium 250 mg oral delayed release tablet: 1 tab(s) orally once a day  divalproex sodium 500 mg oral delayed release tablet: 1 tab(s) orally 2 times a day  fluconazole 200 mg oral tablet: 1 tab(s) orally once a day  HumaLOG 100 units/mL injectable solution: 4 unit(s) injectable 3 times a day  insulin glargine 100 units/mL subcutaneous solution: 6 unit(s) subcutaneous once a day (at bedtime)  levETIRAcetam 250 mg oral tablet: 1 tab(s) orally 2 times a day  levothyroxine 50 mcg (0.05 mg) oral tablet: 1 tab(s) orally once a day  magnesium oxide 400 mg oral tablet: 1 tab(s) orally 3 times a day (with meals)  pantoprazole 40 mg oral delayed release tablet: 1 tab(s) orally once a day (before a meal)  predniSONE: 5 milligram(s) orally once a day  senna oral tablet: 2 tab(s) orally once a day (at bedtime)  sulfamethoxazole-trimethoprim 400 mg-80 mg oral tablet: 1 tab(s) orally once a day  tacrolimus 4 mg oral tablet, extended release: 4 milligram(s) orally once a day  valGANciclovir 450 mg oral tablet: 1 tab(s) orally 3 times a week   apixaban 2.5 mg oral tablet: 1 tab(s) orally 2 times a day  atorvastatin 40 mg oral tablet: 1 tab(s) orally once a day (at bedtime)  CellCept 500 mg oral tablet: 2 tab(s) orally 2 times a day  Coreg 25 mg oral tablet: 1 tab(s) orally every 12 hours  divalproex sodium 250 mg oral delayed release tablet: 1 tab(s) orally once a day  divalproex sodium 500 mg oral delayed release tablet: 1 tab(s) orally 2 times a day  fluconazole 200 mg oral tablet: 1 tab(s) orally once a day  HumaLOG 100 units/mL injectable solution: 4 unit(s) injectable 3 times a day  insulin glargine 100 units/mL subcutaneous solution: 6 unit(s) subcutaneous once a day (at bedtime)  levETIRAcetam 250 mg oral tablet: 1 tab(s) orally 2 times a day  levothyroxine 50 mcg (0.05 mg) oral tablet: 1 tab(s) orally once a day  magnesium oxide 400 mg oral tablet: 1 tab(s) orally 3 times a day (with meals)  pantoprazole 40 mg oral delayed release tablet: 1 tab(s) orally once a day (before a meal)  predniSONE: 5 milligram(s) orally once a day  senna oral tablet: 2 tab(s) orally once a day (at bedtime)  sulfamethoxazole-trimethoprim 400 mg-80 mg oral tablet: 1 tab(s) orally once a day  tacrolimus 4 mg oral tablet, extended release: 5 milligram(s) orally once a day  valGANciclovir 450 mg oral tablet: 1 tab(s) orally 3 times a week

## 2022-05-04 NOTE — PROGRESS NOTE ADULT - ASSESSMENT
66 yr old man with ESRD due to DM on HD since 2019 presenting for DDRT. Pt underwent DDRT on 4/21/22 with DGF and HD dependant presenting with increased lethargy and symptomatic severe anemia with hematomas around allograft. Primary Nephrologist Dr. Rey/Iliana.   Donor: 57 year old male, KDPI 82%, PHS high risk, terminal Cr 1.34, CMV pos, EBV pos, HCV OPAL neg)     1. s/p DDRT on 4/21/22 -  Allograft function with DGF and HD dependant. Last HD done on 4/29/22. On Lasix 80 mg IV bid. UOP was 1.7L and net negative 1.2L in the last 24 hrs. Weight is same as on admission.  will hold off IHD today and reassess tomw.   2.Immunosuppression- s/p Thymo induction. On Envarsus adjust as per levels. target goal 8-10, Continue Cellcept 1 gm BID and Prednisone 5 mg po daily .Infection ppx with  Fluconazole , bactrim, Valcyte. On Fluc and Zosyn for infection ppx due to reopening in OR on 5/2   3. Seizures- Continue Depakote and Keppra.  4.Hypertension-  Controlled on Amlodipine and Coreg 3.125 mg po bid   5.DM - On insulin.  6.Anemia - CT A/P revealed a large hematoma.  s/p hematoma evacuation and repair of arterial anastomosis on 5/2. . Hb is 6.9 today . Will give 2 units of PRBC    Eliquis on hold  . Will keep hemoglobin > 8 given hx of CAD.

## 2022-05-04 NOTE — DIETITIAN INITIAL EVALUATION ADULT - ADD RECOMMEND
1. Will continue to monitor PO intake, weight, labs, skin, GI status, and diet as able. 2. Encourage PO intake and honor food preferences. 3. Consider Nephro-Windy if medically feasible to optimize nutrient intake. 4. Reviewed education on DM nutrition therapy and renal diet while on HD per pt's request - made aware RD remains available.

## 2022-05-04 NOTE — DIETITIAN INITIAL EVALUATION ADULT - PERTINENT LABORATORY DATA
(05/04) Na 131, BUN 72, Cr 4.14, , all other WNL   (05/03) Phos 5.0  Finger sticks: (05/04) 197 (05/03) 221 - 321   A1C with Estimated Average Glucose Result: 6.6 % (04-24-22 @ 17:12)  A1C with Estimated Average Glucose Result: 7.3 % (02-04-22 @ 13:42)  A1C with Estimated Average Glucose Result: 7.2 % (02-04-22 @ 05:48)

## 2022-05-04 NOTE — DIETITIAN INITIAL EVALUATION ADULT - DIET TYPE
1. Recommend Consistent Carbohydrate with snack + renal diet upon discharge. Recommend follow up visit with Transplant MD and outpatient RD for dietary modifications as warranted. 2. Recommend Nepro 1x daily (425 kcals, 19 g protein) to optimize kcal and protein intake. Spoke to Transplant Team.

## 2022-05-04 NOTE — DISCHARGE NOTE PROVIDER - CARE PROVIDER_API CALL
Eduardo Barber)  Surgery  Organ Transplant  [address] [place] [address]  Phone: [phone]  Fax: [phone]  Follow Up Time:

## 2022-05-04 NOTE — DIETITIAN INITIAL EVALUATION ADULT - REASON INDICATOR FOR ASSESSMENT
Pt seen for post kidney transplant recipient re-admission nutrition evaluation and verbal consult for "pt with questions about diet".   Information obtained from: medical record, previous RD note (pt known to me from previous admission), and pt.  Pt William-Speaking - refused translation services, able to understand and speak English.

## 2022-05-04 NOTE — DIETITIAN INITIAL EVALUATION ADULT - ORAL INTAKE PTA/DIET HISTORY
Pt reports good appetite and PO intake at home. Confirms NKFA. Denies taking any vitamins/minerals PTA; states drinking Nepro 1xday. Pt reports not following any type of diet or restriction at home but states following food safety guidelines for transplant patients. Reports monitoring BG 3xday with ranges 85 - 200 mg/dl and states taking Novolog at home; HbA1c (04/24) 6.6% - suggests good BG control.

## 2022-05-04 NOTE — PROGRESS NOTE ADULT - ASSESSMENT
66M with history of HTN, HLD, DM (on insulin), ESRD (2/2 DM and HTN) on HD via L permacath since 2019 MWF, AFib, CVA (2019 2/2 afib), seizures, CABG (2020), gastric/duodenal ulcers, s/p DCD DDRT (1A/1V/1U stented) on 4/21/2022 with Thymoglobulin Induction c/b DGF requiring HD.  Re-admitted for anemia 2/2 perinephric hematoma s/p OR evacuation on 5/2    s/p R DCD DDRT c/b perinephric hematoma s/p OR evacuation (POD#2)  -anemia this AM, administer 2U PRBC (hx CABG). no concerns for active bleeding at this time  -graft: DGF, has been off HD since 4/29, Cr up to 4 with improving UO on Lasix 80/80.  HD today per Nephrology with fluid removal as tolerated  -continue Zosyn/Fluc for now (ID following)  -abdominal binder while OOB  -PT/SCDs/Spirometry  -ADAT    Immunosuppression  -Envarsus per level, MMF 1/1, Pred 5  -PPx: Nystatin, Bactrim, Valcyte (renal dosing)    DM  -on Lispro/pre-meal/LUCIA, adjust prn    AFib/HTN  -Eliquis/Plavix on HOLD, will restart as able  -will restart HTN regimen as able    Seizures  -continue home meds 66M with history of HTN, HLD, DM (on insulin), ESRD (2/2 DM and HTN) on HD via L permacath since 2019 MWF, AFib, CVA (2019 2/2 afib), seizures, CABG (2020), gastric/duodenal ulcers, s/p DCD DDRT (1A/1V/1U stented) on 4/21/2022 with Thymoglobulin Induction c/b DGF requiring HD.  Re-admitted for anemia 2/2 perinephric hematoma s/p OR evacuation on 5/2    s/p R DCD DDRT c/b perinephric hematoma s/p OR evacuation (POD#2)  -anemia this AM, administer 2U PRBC (hx CABG). no concerns for active bleeding at this time  -graft: DGF, has been off HD since 4/29, Cr up to 4 with improving UO on Lasix 80/80.  HD today per Nephrology with fluid removal as tolerated  -continue Zosyn/Fluc for now (ID following)  -abdominal binder while OOB  -PT/SCDs/Spirometry  -ADAT    Immunosuppression  -Envarsus per level, MMF 1/1, Pred 5  -PPx: Nystatin, Bactrim, Valcyte (renal dosing), PPI    DM  -on Lispro/pre-meal/LUCIA, adjust prn    AFib/HTN  -Eliquis/Plavix on HOLD, will restart as able  -will restart HTN regimen as able    Seizures  -continue home meds 66M with history of HTN, HLD, DM (on insulin), ESRD (2/2 DM and HTN) on HD via L permacath since 2019 MWF, AFib, CVA (2019 2/2 afib), seizures, CABG (2020), gastric/duodenal ulcers, s/p DCD DDRT (1A/1V/1U stented) on 4/21/2022 with Thymoglobulin Induction c/b DGF requiring HD.  Re-admitted for anemia 2/2 perinephric hematoma s/p OR evacuation on 5/2    s/p R DCD DDRT c/b perinephric hematoma s/p OR evacuation (POD#2)  -anemia this AM, administer 2U PRBC (hx CABG). Epogen 10K x1.  no concerns for active bleeding at this time  -graft: DGF, has been off HD since 4/29, Cr up to 4 with improving UO on Lasix 80/80.  HD today per Nephrology with fluid removal as tolerated  -continue Zosyn/Fluc for now (ID following)  -abdominal binder while OOB  -PT/SCDs/Spirometry  -ADAT    Immunosuppression  -Envarsus per level, MMF 1/1, Pred 5  -PPx: Nystatin, Bactrim, Valcyte (renal dosing), PPI    DM  -on Lispro/pre-meal/LUCIA, adjust prn    AFib/HTN  -Eliquis/Plavix on HOLD, will restart as able  -will restart HTN regimen as able    Seizures  -continue home meds 66M with history of HTN, HLD, DM (on insulin), ESRD (2/2 DM and HTN) on HD via L permacath since 2019 MWF, AFib, CVA (2019 2/2 afib), seizures, CABG (2020), gastric/duodenal ulcers, s/p DCD DDRT (1A/1V/1U stented) on 4/21/2022 with Thymoglobulin Induction c/b DGF requiring HD.  Re-admitted for anemia 2/2 perinephric hematoma s/p OR evacuation on 5/2    s/p R DCD DDRT c/b perinephric hematoma s/p OR evacuation (POD#2)  -anemia this AM, administer 2U PRBC (hx CABG). Epogen 10K x1.  no concerns for active bleeding at this time  -graft: DGF, has been off HD since 4/29, Cr up to 4 but with improving UO and edema on Lasix 80/80.  Will continue to hold HD per Nephrology  -continue Zosyn/Fluc for now (ID following)  -abdominal binder while OOB  -PT/SCDs/Spirometry  -ADAT    Immunosuppression  -Envarsus per level, MMF 1/1, Pred 5  -PPx: Nystatin, Bactrim, Valcyte (renal dosing), PPI    DM  -on Lispro/pre-meal/LUCIA, adjust prn    AFib/HTN  -Eliquis/Plavix on HOLD, will restart as able  -will restart HTN regimen as able    Seizures  -continue home meds

## 2022-05-04 NOTE — DISCHARGE NOTE PROVIDER - NSDCFUADDAPPT_GEN_ALL_CORE_FT
Follow up with Dr. Barber on Follow up with Dr. Barber on Monday 5/16/2022 at the Transplant clinic. Phone [phone]

## 2022-05-04 NOTE — DIETITIAN INITIAL EVALUATION ADULT - PERTINENT MEDS FT
MEDICATIONS  (STANDING):  atorvastatin 40 milliGRAM(s) Oral at bedtime  chlorhexidine 2% Cloths 1 Application(s) Topical daily  dextrose 5%. 1000 milliLiter(s) (50 mL/Hr) IV Continuous <Continuous>  dextrose 5%. 1000 milliLiter(s) (100 mL/Hr) IV Continuous <Continuous>  dextrose 50% Injectable 25 Gram(s) IV Push once  dextrose 50% Injectable 12.5 Gram(s) IV Push once  dextrose 50% Injectable 25 Gram(s) IV Push once  diVALproex  milliGRAM(s) Oral <User Schedule>  diVALproex  milliGRAM(s) Oral <User Schedule>  epoetin cortney-epbx (RETACRIT) Injectable 95629 Unit(s) SubCutaneous every 7 days  fluconAZOLE IVPB 200 milliGRAM(s) IV Intermittent every 24 hours  furosemide   Injectable 80 milliGRAM(s) IV Push two times a day  glucagon  Injectable 1 milliGRAM(s) IntraMuscular once  insulin glargine Injectable (LANTUS) 6 Unit(s) SubCutaneous at bedtime  insulin lispro (ADMELOG) corrective regimen sliding scale   SubCutaneous three times a day before meals  insulin lispro (ADMELOG) corrective regimen sliding scale   SubCutaneous at bedtime  insulin lispro Injectable (ADMELOG) 4 Unit(s) SubCutaneous three times a day before meals  levETIRAcetam 250 milliGRAM(s) Oral every 12 hours  levothyroxine 50 MICROGram(s) Oral daily  magnesium sulfate  IVPB 2 Gram(s) IV Intermittent once  mycophenolate mofetil 1000 milliGRAM(s) Oral <User Schedule>  pantoprazole    Tablet 40 milliGRAM(s) Oral before breakfast  piperacillin/tazobactam IVPB.. 3.375 Gram(s) IV Intermittent every 12 hours  polyethylene glycol 3350 17 Gram(s) Oral daily  predniSONE   Tablet 5 milliGRAM(s) Oral daily  senna 2 Tablet(s) Oral at bedtime  sevelamer carbonate 800 milliGRAM(s) Oral three times a day with meals  sodium bicarbonate 1300 milliGRAM(s) Oral every 12 hours  trimethoprim   80 mG/sulfamethoxazole 400 mG 1 Tablet(s) Oral daily  valGANciclovir 450 milliGRAM(s) Oral <User Schedule>    MEDICATIONS  (PRN):  dextrose Oral Gel 15 Gram(s) Oral once PRN Blood Glucose LESS THAN 70 milliGRAM(s)/deciliter

## 2022-05-04 NOTE — DIETITIAN INITIAL EVALUATION ADULT - OTHER INFO
Pt reports 10 pounds weight gain since transplant ~2 weeks, from 135 to 145 pounds. Weight as per previous RD note (04/23/2022) 135 pounds. Weight as per flow sheets (05/02) 151 pounds -> (05/04) 165.7 pounds -?accuracy of weight fluctuations as pt with edema, on HD, and S/P recent kidney transplant.     Pt with questions about what diet to follow. Reviewed education on DM nutrition therapy and renal diet while on HD. Reviewed foods containing carbohydrates, foods containing proteins, and portion sizes. Stressed the importance of a balanced meal to maintain blood glucose. Encouraged vegetables consumption. Recommended water consumption with avoidance of soda and juice. Discussed to avoid concentrated sweets. Reviewed foods high in potassium, phosphorus, and sodium and recommend follow up visit with Transplant MD and outpatient RD for dietary modifications as warranted. All questions answered. Pt made aware RD remains available.

## 2022-05-04 NOTE — PROGRESS NOTE ADULT - SUBJECTIVE AND OBJECTIVE BOX
Transplant Surgery - Multidisciplinary Rounds  --------------------------------------------------------------  DDRT Date: 4/21/22    66M with history of HTN, HLD, DM (on insulin), ESRD (2/2 DM and HTN) on HD via permacath since 3yrs ago MWF, Afib (on Eliquis 2.5mg BID), hx of stroke (2019 2/2 afib), focal seizure (on depakote, keppra), hx of CABG (2020), hx of nonbleeding gastric/duodenal ulcer (EGD 2/9/22, on protonix)    s/p R DCD DDRT (1A/1V/1U stented) on 4/21/2022 with Thymoglobulin Induction   - Donor: 56 y/o M, KDPI 82%, PHS high risk, terminal Cr 1.34, CMV pos, EBV pos, HCV OPAL neg)   - Graft: post op renal doppler with  good perfusion; course c/b delayed graft function requiring HD inpatient and on discharge.  - A fib: resumed Eliquis; Plavix on HOLD    -Readmitted on 4/28 with weakness, anemia  -s/p OR evacuation of hematoma and repair of bleeding by arterial anastomosis, graft bx on 5/2    Interval Events:  -POD#2 s/p OR hematoma evacuation  -anemia this AM, no signs of acute bleeding, requiring transfusion  -ALINA ~600 ss output/24h, decreasing over the last few hours  -generalized weakness improving, though OOB with heavy assist  -edema with slow improvement after restart of Lasix  -graft: improving DGF, last HD on 4/29. UO improving daily  -rest of VSS; Afebrile on Zosyn/Fluc (started post-op)    Immunosuppression:  -Envarsus per level, MMF 1/1, Pred 5  -ongoing monitoring for signs of rejection    Potential Discharge date: pending clinical improvement.     Education:  Medications    Plan of care:  See Below      MEDICATIONS  (STANDING):  atorvastatin 40 milliGRAM(s) Oral at bedtime  chlorhexidine 2% Cloths 1 Application(s) Topical daily  dextrose 5%. 1000 milliLiter(s) (50 mL/Hr) IV Continuous <Continuous>  dextrose 5%. 1000 milliLiter(s) (100 mL/Hr) IV Continuous <Continuous>  dextrose 50% Injectable 25 Gram(s) IV Push once  dextrose 50% Injectable 12.5 Gram(s) IV Push once  dextrose 50% Injectable 25 Gram(s) IV Push once  diVALproex  milliGRAM(s) Oral <User Schedule>  diVALproex  milliGRAM(s) Oral <User Schedule>  epoetin cortney-epbx (RETACRIT) Injectable 91627 Unit(s) SubCutaneous every 7 days  fluconAZOLE IVPB 200 milliGRAM(s) IV Intermittent every 24 hours  furosemide   Injectable 80 milliGRAM(s) IV Push two times a day  glucagon  Injectable 1 milliGRAM(s) IntraMuscular once  insulin glargine Injectable (LANTUS) 6 Unit(s) SubCutaneous at bedtime  insulin lispro (ADMELOG) corrective regimen sliding scale   SubCutaneous three times a day before meals  insulin lispro (ADMELOG) corrective regimen sliding scale   SubCutaneous at bedtime  levETIRAcetam 250 milliGRAM(s) Oral every 12 hours  levothyroxine 50 MICROGram(s) Oral daily  magnesium sulfate  IVPB 2 Gram(s) IV Intermittent once  mycophenolate mofetil 1000 milliGRAM(s) Oral <User Schedule>  pantoprazole    Tablet 40 milliGRAM(s) Oral before breakfast  piperacillin/tazobactam IVPB.. 3.375 Gram(s) IV Intermittent every 12 hours  polyethylene glycol 3350 17 Gram(s) Oral daily  predniSONE   Tablet 5 milliGRAM(s) Oral daily  senna 2 Tablet(s) Oral at bedtime  sevelamer carbonate 800 milliGRAM(s) Oral three times a day with meals  sodium bicarbonate 1300 milliGRAM(s) Oral every 12 hours  trimethoprim   80 mG/sulfamethoxazole 400 mG 1 Tablet(s) Oral daily  valGANciclovir 450 milliGRAM(s) Oral <User Schedule>    MEDICATIONS  (PRN):  dextrose Oral Gel 15 Gram(s) Oral once PRN Blood Glucose LESS THAN 70 milliGRAM(s)/deciliter      Vital Signs Last 24 Hrs  T(C): 36.6 (04 May 2022 09:07), Max: 37.1 (03 May 2022 20:35)  T(F): 97.9 (04 May 2022 09:07), Max: 98.8 (03 May 2022 20:35)  HR: 88 (04 May 2022 09:07) (69 - 88)  BP: 153/68 (04 May 2022 09:07) (138/70 - 165/77)  BP(mean): 93 (04 May 2022 07:19) (93 - 110)  RR: 20 (04 May 2022 09:07) (18 - 20)  SpO2: 96% (04 May 2022 09:07) (96% - 100%)    I&O's Summary    03 May 2022 07:01  -  04 May 2022 07:00  --------------------------------------------------------  IN: 1380 mL / OUT: 2625 mL / NET: -1245 mL    04 May 2022 07:01  -  04 May 2022 10:58  --------------------------------------------------------  IN: 0 mL / OUT: 555 mL / NET: -555 mL                              6.9    12.45 )-----------( 186      ( 04 May 2022 04:25 )             20.8     05-04    131<L>  |  91<L>  |  72<H>  ----------------------------<  185<H>  4.4   |  27  |  4.14<H>    Ca    8.2<L>      04 May 2022 04:27  Phos  4.2     05-04  Mg     1.7     05-04    TPro  5.5<L>  /  Alb  2.4<L>  /  TBili  0.4  /  DBili  x   /  AST  28  /  ALT  15  /  AlkPhos  68  05-04    Tacrolimus (), Serum: 8.7 ng/mL (05-03 @ 06:48)        Culture - Body Fluid with Gram Stain (collected 05-03-22 @ 02:12)  Source: .Body Fluid BLOOD CLOT  Gram Stain (05-03-22 @ 06:37):    No polymorphonuclear cells seen per low power field    No organisms seen per oil power field            Review of Systems: Gen: No weight changes, fatigue, fevers/chills, +weakness  Skin: No rashes  Head/Eyes/Ears/Mouth: No headache; Normal hearing; Normal vision w/o blurriness; No sinus pain/discomfort, sore throat  Respiratory: No dyspnea, cough, wheezing, hemoptysis  CV: No chest pain, PND, orthopnea  GI: mild abdominal pain, no diarrhea, constipation, nausea, vomiting, melena, hematochezia  : No increased frequency, dysuria, hematuria, nocturia  MSK: No joint pain/swelling; no back pain; no edema  Neuro: No dizziness/lightheadedness, weakness, seizures, numbness, tingling  Heme: No easy bruising or bleeding  Endo: No heat/cold intolerance  Psych: No significant nervousness, anxiety, stress, depression  All other systems were reviewed and are negative, except as noted    Physical Exam:   Constitutional: Well developed / well nourished  Eyes: Anicteric, PERRLA  ENMT: nc/at  Neck: Supple  Respiratory: CTA B/L  Cardiovascular: RRR  Gastrointestinal: Soft abdomen, appropriate incisional TTP. dressing removed, incision c/d/i. no signs of peritonitis. ALINA x1 ss  Genitourinary:  good UO after tucker removal  Extremities: SCD's in place and working bilaterally. +b/l LE 2+ pitting edema. +scrotal edema--all improving  Vascular: Palpable dp pulses bilaterally  Neurological: A&O x3  Musculoskeletal: Moving all extremities, generalized weakness  Psychiatric: Responsive

## 2022-05-04 NOTE — DISCHARGE NOTE PROVIDER - HOSPITAL COURSE
66M with history of HTN, HLD, DM (on insulin), ESRD (2/2 DM and HTN) on HD via L permacath, Afib (on Eliquis 2.5mg BID), hx of stroke (2019 2/2 afib), focal seizure (on depakote, keppra), CABG (2020), nonbleeding gastric/duodenal ulcer (EGD 2/9/22, on protonix)    s/p R DCD DDRT (1A/1V/1U stented) on 4/21/2022 with Thymoglobulin Induction   - Donor: 58 y/o M, KDPI 82%, PHS high risk, terminal Cr 1.34, CMV pos, EBV pos, HCV OPAL neg)   - Graft: post op renal doppler with  good perfusion; course c/b delayed graft function requiring HD inpatient and on discharge.  - AFib: resumed Eliquis; Plavix on HOLD    -Readmitted on 4/28 with weakness, anemia requiring intermittent PRBC/Epogen. Did not have signs of shock  -Imaging revealed perinephric hematoma-->s/p OR evacuation of hematoma and repair of bleeding by arterial anastomosis, graft bx on 5/2 with overall improvement in symptoms  -Graft: last HD on 4/29. Improved UO this admission on Lasix trials.  ----------------------  -post-op ALINA was removed.   -received Zosyn/Fluc for post-op PPx with negative OR cultures.  -post-op debility, worked with PT. plan for [place]    Evaluated daily by our multidisciplinary transplant team of surgeons, nephrologists, pharmacists, ACPs, SW, RNs, Nutrition and deemed safe for d/c to [facility] with the following plan:  -HD-------------  -ENV-----, MMF 1/1, Pred 5  -Eliquis/Plavix---------------------'  -f/u with Dr. Barber on--------- 66M with history of HTN, HLD, DM (on insulin), ESRD (2/2 DM and HTN) on HD via L permacath, Afib (on Eliquis 2.5mg BID), hx of stroke (2019 2/2 afib), focal seizure (on depakote, keppra), CABG (2020), nonbleeding gastric/duodenal ulcer (EGD 2/9/22, on protonix)    s/p R DCD DDRT (1A/1V/1U stented) on 4/21/2022 with Thymoglobulin Induction   - Donor: 56 y/o M, KDPI 82%, PHS high risk, terminal Cr 1.34, CMV pos, EBV pos, HCV OPAL neg)   - Graft: post op renal doppler with  good perfusion; course c/b delayed graft function requiring HD inpatient and on discharge.  - AFib: resumed Eliquis; Plavix on HOLD    -Readmitted on 4/28 with weakness, anemia requiring intermittent PRBC/Epogen. Did not have signs of shock  -Imaging revealed perinephric hematoma-->s/p OR evacuation of hematoma and repair of bleeding by arterial anastomosis, graft bx on 5/2 with overall improvement in symptoms  -Graft: last HD on 4/29. Improved UO and Cr this admission on Lasix trials (now off).  D/C Cr was-------------.  L PermCath was removed by IR on 5/10  -intra-op graft bx negative  -post-op ALINA was removed.   -received Zosyn for post-op PPx with negative OR cultures.  Fluc was continued for 1 month PPx  -post-op debility, worked with PT-->[place]    Evaluated daily by our multidisciplinary transplant team of surgeons, nephrologists, pharmacists, ACPs, SW, RNs, Nutrition and deemed safe for d/c to [place] with the following plan:  -ENV-----, MMF 1/1, Pred 5  -Eliquis 2.5BID. Plavix no longer required  -f/u with Dr. Barber on Monday 5/16 66M with history of HTN, HLD, DM (on insulin), ESRD (2/2 DM and HTN) on HD via L permacath, Afib (on Eliquis 2.5mg BID), hx of stroke (2019 2/2 afib), focal seizure (on depakote, keppra), CABG (2020), nonbleeding gastric/duodenal ulcer (EGD 2/9/22, on protonix)    s/p R DCD DDRT (1A/1V/1U stented) on 4/21/2022 with Thymoglobulin Induction   - Donor: 58 y/o M, KDPI 82%, PHS high risk, terminal Cr 1.34, CMV pos, EBV pos, HCV OPAL neg)   - Graft: post op renal doppler with  good perfusion; course c/b delayed graft function requiring HD inpatient and on discharge.  - AFib: resumed Eliquis; Plavix on HOLD    -Readmitted on 4/28 with weakness, anemia requiring intermittent PRBC/Epogen. Did not have signs of shock  -Imaging revealed perinephric hematoma-->s/p OR evacuation of hematoma and repair of bleeding by arterial anastomosis, graft bx on 5/2 with overall improvement in symptoms  -Graft: last HD on 4/29. Improved UO and Cr this admission on Lasix trials (now off).  D/C Cr was 2.86.  L PermCath was removed by IR on 5/10  -intra-op graft bx negative  -post-op ALINA was removed.   -received Zosyn for post-op PPx with negative OR cultures.  Fluc was continued for 1 month PPx  -post-op debility, worked with PT-->OK    Evaluated daily by our multidisciplinary transplant team of surgeons, nephrologists, pharmacists, ACPs, SW, RNs, Nutrition and deemed safe for d/c to [place] with the following plan:  -ENV-----, MMF 1/1, Pred 5  -Eliquis 2.5BID. Plavix no longer required  -f/u with Dr. Barber on Monday 5/16 66M with history of HTN, HLD, DM (on insulin), ESRD (2/2 DM and HTN) on HD via L permacath, Afib (on Eliquis 2.5mg BID), hx of stroke (2019 2/2 afib), focal seizure (on depakote, keppra), CABG (2020), nonbleeding gastric/duodenal ulcer (EGD 2/9/22, on protonix)    s/p R DCD DDRT (1A/1V/1U stented) on 4/21/2022 with Thymoglobulin Induction   - Donor: 56 y/o M, KDPI 82%, PHS high risk, terminal Cr 1.34, CMV pos, EBV pos, HCV OPAL neg)   - Graft: post op renal doppler with  good perfusion; course c/b delayed graft function requiring HD inpatient and on discharge.  - AFib: resumed Eliquis; Plavix on HOLD    -Readmitted on 4/28 with weakness, anemia requiring intermittent PRBC/Epogen. Did not have signs of shock  -Imaging revealed perinephric hematoma-->s/p OR evacuation of hematoma and repair of bleeding by arterial anastomosis, graft bx on 5/2 with overall improvement in symptoms  -Graft: last HD on 4/29. Improved UO and Cr this admission on Lasix trials (now off).  D/C Cr was 2.86.  L PermCath was removed by IR on 5/10  -intra-op graft bx negative  -post-op ALINA was removed.   -received Zosyn for post-op PPx with negative OR cultures.  Fluc was continued for 1 month PPx  -post-op debility, worked with PT-->OK    Evaluated daily by our multidisciplinary transplant team of surgeons, nephrologists, pharmacists, ACPs, SW, RNs, Nutrition and deemed safe for d/c to [place] with the following plan:  -ENV 5mg qd, MMF 1/1, Pred 5  -Eliquis 2.5BID. Plavix no longer required  -f/u with Dr. Barber on Monday 5/16

## 2022-05-04 NOTE — PROGRESS NOTE ADULT - ASSESSMENT
66 year old male PMH ESRD (2/2 DM and HTN) s/p DCD DDRT (CMV +/+, EBV +/+) on 4/21/202, HTN, HLD, DM (on insulin), A-fib (on eliquis 2.5mg BID), hx of stroke (2019 2/2 afib), focal seizure (on depakote, keppra), hx of CABG (2020), hx of nonbleeding gastric/duodenal ulcer (EGD 2/9/22, on protonix) who presented to [facility] as his family was not able take care of patient at home.     CT A/P (5/2) with Large right extraperitoneal hematoma, displacing the transplant kidney medially and displacing bowel to the left.     On 5/2 s/p OR hematoma evaluation.     At this point do not have high suspicion for superinfection of hematoma but reasonable to empirically cover pending cultures    #Hematoma, Leukocytosis  --Continue Zosyn pending operative hematoma drainage cultures  --Doubt need for Fluconazole  --Continue to follow CBC with diff  --Continue to follow temperature curve  --Follow up on preliminary OR culture    #Renal Transplant Recipient (CMV +/+, EBV +/+)   --Continue Valcyte 450 mg M/W/F  --Continue Bactrim for PCP PPx    I will continue to follow. Please feel free to contact me with any further questions.    Carlton Hoover M.D.  [facility] Division of Infectious Disease  8AM-5PM Monday - Friday: Available on [employer] Teams  After Hours and Holidays (or if no response on [employer] Teams): Please contact the Infectious Diseases Office at [phone]    The above assessment and plan were discussed with transplant surgery team

## 2022-05-04 NOTE — DIETITIAN INITIAL EVALUATION ADULT - PERSON TAUGHT/METHOD
DM nutrition therapy; renal diet while on HD/verbal instruction/teach back - (Patient repeats in own words)/patient instructed

## 2022-05-04 NOTE — DIETITIAN INITIAL EVALUATION ADULT - REASON FOR ADMISSION
Pt 65 y/o M with PMH as per chart: HTN, HLD, DM (on insulin), ESRD (2/2 DM and HTN) on HD, Afib, hx of stroke (2019 2/2 Afib), focal seizure, CABG (2020), hx of nonbleeding gastric/duodenal ulcer (EGD 2/9/22, on Protonix), S/P R DCD DDRT on (04/21/2022), course c/b delayed graft function requiring HD inpatient and on discharge, readmitted on (04/28) with weakness, anemia, S/P OR evacuation of hematoma and repair of bleeding by arterial anastomosis, graft bx on (05/02); improving DGF, last HD on (04/29), HD today (05/04) per Nephrology with fluid removal as tolerated.

## 2022-05-04 NOTE — PROGRESS NOTE ADULT - SUBJECTIVE AND OBJECTIVE BOX
Follow Up:      Interval History:    REVIEW OF SYSTEMS  [  ] ROS unobtainable because:    [  ] All other systems negative except as noted below    Constitutional:  [ ] fever [ ] chills  [ ] weight loss  [ ] weakness  Skin:  [ ] rash [ ] phlebitis	  Eyes: [ ] icterus [ ] pain  [ ] discharge	  ENMT: [ ] sore throat  [ ] thrush [ ] ulcers [ ] exudates  Respiratory: [ ] dyspnea [ ] hemoptysis [ ] cough [ ] sputum	  Cardiovascular:  [ ] chest pain [ ] palpitations [ ] edema	  Gastrointestinal:  [ ] nausea [ ] vomiting [ ] diarrhea [ ] constipation [ ] pain	  Genitourinary:  [ ] dysuria [ ] frequency [ ] hematuria [ ] discharge [ ] flank pain  [ ] incontinence  Musculoskeletal:  [ ] myalgias [ ] arthralgias [ ] arthritis  [ ] back pain  Neurological:  [ ] headache [ ] seizures  [ ] confusion/altered mental status    Allergies  No Known Allergies        ANTIMICROBIALS:  fluconAZOLE IVPB 200 every 24 hours  piperacillin/tazobactam IVPB.. 3.375 every 12 hours  trimethoprim   80 mG/sulfamethoxazole 400 mG 1 daily  valGANciclovir 450 <User Schedule>      OTHER MEDS:  MEDICATIONS  (STANDING):  atorvastatin 40 at bedtime  dextrose 50% Injectable 25 once  dextrose 50% Injectable 12.5 once  dextrose 50% Injectable 25 once  dextrose Oral Gel 15 once PRN  diVALproex  <User Schedule>  diVALproex  <User Schedule>  epoetin cortney-epbx (RETACRIT) Injectable 86042 every 7 days  furosemide   Injectable 80 two times a day  glucagon  Injectable 1 once  insulin glargine Injectable (LANTUS) 6 at bedtime  insulin lispro (ADMELOG) corrective regimen sliding scale  three times a day before meals  insulin lispro (ADMELOG) corrective regimen sliding scale  at bedtime  insulin lispro Injectable (ADMELOG) 4 three times a day before meals  levETIRAcetam 250 every 12 hours  levothyroxine 50 daily  mycophenolate mofetil 1000 <User Schedule>  pantoprazole    Tablet 40 before breakfast  polyethylene glycol 3350 17 daily  predniSONE   Tablet 5 daily  senna 2 at bedtime      Vital Signs Last 24 Hrs  T(C): 36.8 (04 May 2022 17:00), Max: 37.1 (03 May 2022 20:35)  T(F): 98.2 (04 May 2022 17:00), Max: 98.8 (03 May 2022 20:35)  HR: 73 (04 May 2022 17:00) (69 - 88)  BP: 162/77 (04 May 2022 17:00) (138/70 - 170/77)  BP(mean): 110 (04 May 2022 17:00) (93 - 110)  RR: 18 (04 May 2022 17:00) (18 - 20)  SpO2: 100% (04 May 2022 17:00) (96% - 100%)    PHYSICAL EXAMINATION:  General: Alert and Awake, NAD  HEENT: PERRL, EOMI  Neck: Supple  Cardiac: RRR, No M/R/G  Resp: CTAB, No Wh/Rh/Ra  Abdomen: NBS, NT/ND, No HSM, No rigidity or guarding  MSK: No LE edema. No Calf tenderness  : No tucker  Skin: No rashes or lesions. Skin is warm and dry to the touch.   Neuro: Alert and Awake. CN 2-12 Grossly intact. Moves all four extremities spontaneously.  Psych: Calm, Pleasant, Cooperative                          6.9    12.45 )-----------( 186      ( 04 May 2022 04:25 )             20.8       05-04    131<L>  |  91<L>  |  72<H>  ----------------------------<  185<H>  4.4   |  27  |  4.14<H>    Ca    8.2<L>      04 May 2022 04:27  Phos  4.2     05-04  Mg     1.7     05-04    TPro  5.5<L>  /  Alb  2.4<L>  /  TBili  0.4  /  DBili  x   /  AST  28  /  ALT  15  /  AlkPhos  68  05-04          MICROBIOLOGY:  v  .Body Fluid BLOOD CLOT  05-03-22   No growth to date.  --    No polymorphonuclear cells seen per low power field  No organisms seen per oil power field                RADIOLOGY:    <The imaging below has been reviewed and visualized by me independently. Findings as detailed in report below> Follow Up:  fluid collection    Interval History: afebrile. abdominal pain improving.     REVIEW OF SYSTEMS  [  ] ROS unobtainable because:    [ x ] All other systems negative except as noted below    Constitutional:  [ ] fever [ ] chills  [ ] weight loss  [ ] weakness  Skin:  [ ] rash [ ] phlebitis	  Eyes: [ ] icterus [ ] pain  [ ] discharge	  ENMT: [ ] sore throat  [ ] thrush [ ] ulcers [ ] exudates  Respiratory: [ ] dyspnea [ ] hemoptysis [ ] cough [ ] sputum	  Cardiovascular:  [ ] chest pain [ ] palpitations [ ] edema	  Gastrointestinal:  [ ] nausea [ ] vomiting [ ] diarrhea [ ] constipation [x ] pain	  Genitourinary:  [ ] dysuria [ ] frequency [ ] hematuria [ ] discharge [ ] flank pain  [ ] incontinence  Musculoskeletal:  [ ] myalgias [ ] arthralgias [ ] arthritis  [ ] back pain  Neurological:  [ ] headache [ ] seizures  [ ] confusion/altered mental status    Allergies  No Known Allergies        ANTIMICROBIALS:  fluconAZOLE IVPB 200 every 24 hours  piperacillin/tazobactam IVPB.. 3.375 every 12 hours  trimethoprim   80 mG/sulfamethoxazole 400 mG 1 daily  valGANciclovir 450 <User Schedule>      OTHER MEDS:  MEDICATIONS  (STANDING):  atorvastatin 40 at bedtime  dextrose 50% Injectable 25 once  dextrose 50% Injectable 12.5 once  dextrose 50% Injectable 25 once  dextrose Oral Gel 15 once PRN  diVALproex  <User Schedule>  diVALproex  <User Schedule>  epoetin cortney-epbx (RETACRIT) Injectable 92549 every 7 days  furosemide   Injectable 80 two times a day  glucagon  Injectable 1 once  insulin glargine Injectable (LANTUS) 6 at bedtime  insulin lispro (ADMELOG) corrective regimen sliding scale  three times a day before meals  insulin lispro (ADMELOG) corrective regimen sliding scale  at bedtime  insulin lispro Injectable (ADMELOG) 4 three times a day before meals  levETIRAcetam 250 every 12 hours  levothyroxine 50 daily  mycophenolate mofetil 1000 <User Schedule>  pantoprazole    Tablet 40 before breakfast  polyethylene glycol 3350 17 daily  predniSONE   Tablet 5 daily  senna 2 at bedtime      Vital Signs Last 24 Hrs  T(C): 36.8 (04 May 2022 17:00), Max: 37.1 (03 May 2022 20:35)  T(F): 98.2 (04 May 2022 17:00), Max: 98.8 (03 May 2022 20:35)  HR: 73 (04 May 2022 17:00) (69 - 88)  BP: 162/77 (04 May 2022 17:00) (138/70 - 170/77)  BP(mean): 110 (04 May 2022 17:00) (93 - 110)  RR: 18 (04 May 2022 17:00) (18 - 20)  SpO2: 100% (04 May 2022 17:00) (96% - 100%)    PHYSICAL EXAMINATION:  General: Alert and Awake, NAD  Cardiac: RRR, No M/R/G  Resp: CTAB, No Wh/Rh/Ra  Abdomen: RLQ renal transplant surgical site with no erythema, drainage. ALINA drain with serosanguinous drainage. Moderate RLQ tenderness to palpation.   MSK: No LE edema. No Calf tenderness  Skin: No rashes or lesions. Skin is warm and dry to the touch.   Neuro: Alert and Awake. CN 2-12 Grossly intact. Moves all four extremities spontaneously.  Psych: Calm, Pleasant, Cooperative                          6.9    12.45 )-----------( 186      ( 04 May 2022 04:25 )             20.8       05-04    131<L>  |  91<L>  |  72<H>  ----------------------------<  185<H>  4.4   |  27  |  4.14<H>    Ca    8.2<L>      04 May 2022 04:27  Phos  4.2     05-04  Mg     1.7     05-04    TPro  5.5<L>  /  Alb  2.4<L>  /  TBili  0.4  /  DBili  x   /  AST  28  /  ALT  15  /  AlkPhos  68  05-04          MICROBIOLOGY:  v  .Body Fluid BLOOD CLOT  05-03-22   No growth to date.  --    No polymorphonuclear cells seen per low power field  No organisms seen per oil power field    RADIOLOGY:    <The imaging below has been reviewed and visualized by me independently. Findings as detailed in report below>    < from: CT Abdomen and Pelvis No Cont (05.02.22 @ 12:37) >  IMPRESSION:  Right lower quadrant transplant kidney.   Large right extraperitoneal   hematoma, displacing the transplant kidney medially and displacing bowel   to the left. An additional higher attenuation component is seen within   the right pelvis, which may represent more recent bleeding.    < end of copied text >

## 2022-05-04 NOTE — PROGRESS NOTE ADULT - SUBJECTIVE AND OBJECTIVE BOX
[facility] DIVISION OF KIDNEY DISEASES AND HYPERTENSION -- FOLLOW UP NOTE  --------------------------------------------------------------------------------  Chief Complaint:    24 hour events/subjective:  Patient was seen and examined at bedside  Denies any complaints       PAST HISTORY  --------------------------------------------------------------------------------  No significant changes to PMH, PSH, FHx, SHx, unless otherwise noted    ALLERGIES & MEDICATIONS  --------------------------------------------------------------------------------  Allergies    No Known Allergies    Intolerances      Standing Inpatient Medications  atorvastatin 40 milliGRAM(s) Oral at bedtime  chlorhexidine 2% Cloths 1 Application(s) Topical daily  dextrose 5%. 1000 milliLiter(s) IV Continuous <Continuous>  dextrose 5%. 1000 milliLiter(s) IV Continuous <Continuous>  dextrose 50% Injectable 25 Gram(s) IV Push once  dextrose 50% Injectable 12.5 Gram(s) IV Push once  dextrose 50% Injectable 25 Gram(s) IV Push once  diVALproex  milliGRAM(s) Oral <User Schedule>  diVALproex  milliGRAM(s) Oral <User Schedule>  epoetin cortney-epbx (RETACRIT) Injectable 82189 Unit(s) SubCutaneous every 7 days  fluconAZOLE IVPB 200 milliGRAM(s) IV Intermittent every 24 hours  furosemide   Injectable 80 milliGRAM(s) IV Push two times a day  glucagon  Injectable 1 milliGRAM(s) IntraMuscular once  insulin glargine Injectable (LANTUS) 6 Unit(s) SubCutaneous at bedtime  insulin lispro (ADMELOG) corrective regimen sliding scale   SubCutaneous three times a day before meals  insulin lispro (ADMELOG) corrective regimen sliding scale   SubCutaneous at bedtime  insulin lispro Injectable (ADMELOG) 4 Unit(s) SubCutaneous three times a day before meals  levETIRAcetam 250 milliGRAM(s) Oral every 12 hours  levothyroxine 50 MICROGram(s) Oral daily  magnesium sulfate  IVPB 2 Gram(s) IV Intermittent once  mycophenolate mofetil 1000 milliGRAM(s) Oral <User Schedule>  pantoprazole    Tablet 40 milliGRAM(s) Oral before breakfast  piperacillin/tazobactam IVPB.. 3.375 Gram(s) IV Intermittent every 12 hours  polyethylene glycol 3350 17 Gram(s) Oral daily  predniSONE   Tablet 5 milliGRAM(s) Oral daily  senna 2 Tablet(s) Oral at bedtime  sevelamer carbonate 800 milliGRAM(s) Oral three times a day with meals  sodium bicarbonate 1300 milliGRAM(s) Oral every 12 hours  trimethoprim   80 mG/sulfamethoxazole 400 mG 1 Tablet(s) Oral daily  valGANciclovir 450 milliGRAM(s) Oral <User Schedule>    PRN Inpatient Medications  dextrose Oral Gel 15 Gram(s) Oral once PRN      REVIEW OF SYSTEMS  --------------------------------------------------------------------------------  Gen: No fatigue, fevers/chills, weakness  Skin: No rashes  Head/Eyes/Ears/Mouth: No headache;No sore throat  Respiratory: No dyspnea, cough,   CV: No chest pain, PND, orthopnea  GI: No abdominal pain, diarrhea, constipation, nausea, vomiting  Transplant: No pain  : No increased frequency, dysuria, hematuria, nocturia  MSK: No joint pain/swelling; no back pain; no edema  Neuro: No dizziness/lightheadedness, weakness, seizures, numbness, tingling  Psych: No significant nervousness, anxiety, stress, depression    All other systems were reviewed and are negative, except as noted.    VITALS/PHYSICAL EXAM  --------------------------------------------------------------------------------  T(C): 36.6 (05-04-22 @ 09:07), Max: 37.1 (05-03-22 @ 20:35)  HR: 88 (05-04-22 @ 09:07) (69 - 88)  BP: 153/68 (05-04-22 @ 09:07) (138/70 - 165/77)  RR: 20 (05-04-22 @ 09:07) (18 - 20)  SpO2: 96% (05-04-22 @ 09:07) (96% - 100%)  Wt(kg): --  Height (cm): 177.8 (05-02-22 @ 17:09)  Weight (kg): 68.9 (05-02-22 @ 17:09)  BMI (kg/m2): 21.8 (05-02-22 @ 17:09)  BSA (m2): 1.86 (05-02-22 @ 17:09)      05-03-22 @ 07:01  -  05-04-22 @ 07:00  --------------------------------------------------------  IN: 1380 mL / OUT: 2625 mL / NET: -1245 mL    05-04-22 @ 07:01  -  05-04-22 @ 11:37  --------------------------------------------------------  IN: 0 mL / OUT: 555 mL / NET: -555 mL      Physical Exam:  	Gen: NAD  	HEENT: PERRL, supple neck, clear oropharynx  	Pulm: CTA B/L  	CV: RRR, S1S2; no rub  	Back: No spinal or CVA tenderness; no sacral edema  	Abd: +BS, soft, nontender/nondistended                      Transplant: No tenderness, swelling  	: No suprapubic tenderness  	UE: Warm, FROM; no edema; no asterixis  	LE: Warm, FROM; no edema  	Neuro: No focal deficits  	Psych: Normal affect and mood  	Skin: Warm, without rashes      LABS/STUDIES  --------------------------------------------------------------------------------              6.9    12.45 >-----------<  186      [05-04-22 @ 04:25]              20.8     131  |  91  |  72  ----------------------------<  185      [05-04-22 @ 04:27]  4.4   |  27  |  4.14        Ca     8.2     [05-04-22 @ 04:27]      Mg     1.7     [05-04-22 @ 04:27]      Phos  4.2     [05-04-22 @ 04:27]    TPro  5.5  /  Alb  2.4  /  TBili  0.4  /  DBili  x   /  AST  28  /  ALT  15  /  AlkPhos  68  [05-04-22 @ 04:27]    PT/INR: PT 13.3 , INR 1.15       [05-02-22 @ 15:15]  PTT: 23.9       [05-02-22 @ 15:15]          [05-04-22 @ 04:27]    Creatinine Trend:  SCr 4.14 [05-04 @ 04:27]  SCr 3.88 [05-03 @ 06:47]  SCr 3.89 [05-02 @ 22:05]  SCr 3.99 [05-02 @ 19:28]  SCr 3.95 [05-02 @ 07:05]    Tacrolimus (), Serum: 8.7 ng/mL (05-03 @ 06:48)  Tacrolimus (), Serum: 5.3 ng/mL (05-02 @ 07:58)  Tacrolimus (), Serum: 6.5 ng/mL (05-01 @ 08:32)  Tacrolimus (), Serum: 7.4 ng/mL (04-30 @ 07:49)          Iron 17, TIBC 171, %sat 10      [05-02-22 @ 13:03]  Ferritin 1505      [05-02-22 @ 13:05]  PTH -- (Ca 9.2)      [02-05-22 @ 10:13]   294  HbA1c 6.0      [02-21-20 @ 08:53]  Lipid: chol 118, TG 54, HDL 59, LDL --      [06-27-21 @ 09:44]    HBsAb 95500.2      [04-21-22 @ 14:13]  HBsAg Nonreact      [04-21-22 @ 14:13]  HBcAb Reactive      [04-21-22 @ 14:13]  HCV 0.25, Nonreact      [04-21-22 @ 14:13]  HIV Nonreact      [04-21-22 @ 14:47]

## 2022-05-04 NOTE — DISCHARGE NOTE PROVIDER - NSDCCPCAREPLAN_GEN_ALL_CORE_FT
PRINCIPAL DISCHARGE DIAGNOSIS  Diagnosis: Perinephric hematoma  Assessment and Plan of Treatment: you were found to have a hematoma around your transplant that was evacuated in the operating room  no heavy lifting more than 5 pounds for at least 6 weeks  avoid straining and constipation, use stool softeners as needed  if any changes to your wound or health in general, please call the Transplant Clinic or return to [facility]       PRINCIPAL DISCHARGE DIAGNOSIS  Diagnosis: Perinephric hematoma  Assessment and Plan of Treatment: you were found to have a hematoma around your transplant that was evacuated in the operating room  no heavy lifting more than 5 pounds for at least 6 weeks  avoid straining and constipation, use stool softeners as needed  if any changes to your wound or health in general, please call the Transplant Clinic or return to [facility]      SECONDARY DISCHARGE DIAGNOSES  Diagnosis: Kidney transplant recipient  Assessment and Plan of Treatment: continue to take all transplant medications as instructed. return or contact clinic with any questions or changes to your health    Diagnosis: Long term current use of immunosuppressive drug  Assessment and Plan of Treatment: follow all precautions after your kidney transplant as educated.  practice good hygeine, wear masks when able  avoid all sick contacts    Diagnosis: Seizures  Assessment and Plan of Treatment: continue home medication    Diagnosis: DM (diabetes mellitus)  Assessment and Plan of Treatment: low sugar diet, follow insulin plan as instructed. check annual ear/skin exam    Diagnosis: Hypertension  Assessment and Plan of Treatment: continue medication as instructed. monitor your BP regularly

## 2022-05-04 NOTE — PROGRESS NOTE ADULT - ASSESSMENT
66M with history of HTN, HLD, DM (on insulin), ESRD (2/2 DM and HTN) on HD via permacath since 3yrs ago MWF, afib (on eliquis 2.5mg BID), hx of stroke (2019 2/2 afib), focal seizure (on depakote, keppra), hx of CABG (2020), hx of nonbleeding gastric/duodenal ulcer (EGD 2/9/22, on protonix), s/p DCD KTX, came back due to weakness    s/p  DCD KTX ( 1A/1V/1U stented) on 4/21/2022 with Thymoglobulin Induction   - Donor: Donor: 56 y/o M, KDPI 82%, PHS high risk, terminal Cr 1.34, CMV pos, EBV pos, HCV OPAL neg)   - Graft: post op renal doppler with  good perfusion; course c/b delayed graft function requiring HD    - last HD 03/29/22, HD per transplant team   - on Lasix 80 mg IV bid.05/03/22  - Immunosuppressant per transplant team   - (CMV +/+, EBV +/+)   -Continue Valcyte 450 mg M/W/F  -Continue Bactrim for PCP PPx    - His regular schedule as outpatient is MWF Held HD today. Kidney function improving.   - Monitor BMP,  urine output    HTN:  -fluctuating   -Monitor BP closely     Anemia:  monitor Hgb   CT shows  Large right extraperitoneal hematoma, displacing the transplant kidney medially and displacing bowel   to the left. An additional higher attenuation component is seen within   the right pelvis, which may represent more recent bleeding.  blood transfusion as per transplant team     CKD; MBD:  Calcium corrected is normal  monitor Po4, Ca

## 2022-05-04 NOTE — DIETITIAN INITIAL EVALUATION ADULT - ENERGY INTAKE
Adequate (%) Pt reports good appetite and PO intake. Requests Nepro 1xday. Denies difficulty chewing/swallowing. Pt denies nausea, vomiting, diarrhea, or constipation, reports last BM yesterday (05/04).

## 2022-05-04 NOTE — PROGRESS NOTE ADULT - SUBJECTIVE AND OBJECTIVE BOX
[facility]   MD NINA MASON MD, DO SADIE RAMIREZ NP    TEL:  OFFICE: [phone]    From 5pm-7am Answering Service [phone]    -- RENAL FOLLOW UP NOTE ---Date of Service 05-04-22 @ 14:32    Patient is a 66y old  Male who presents with a chief complaint of Weakness (04 May 2022 11:37)      Patient seen and examined at bedside. No chest pain/sob    VITALS:  T(F): 98 (05-04-22 @ 13:14), Max: 98.8 (05-03-22 @ 20:35)  HR: 74 (05-04-22 @ 13:14)  BP: 170/77 (05-04-22 @ 13:14)  RR: 20 (05-04-22 @ 13:14)  SpO2: 100% (05-04-22 @ 13:14)  Wt(kg): --    05-03 @ 07:01  -  05-04 @ 07:00  --------------------------------------------------------  IN: 1380 mL / OUT: 2625 mL / NET: -1245 mL    05-04 @ 07:01  -  05-04 @ 14:32  --------------------------------------------------------  IN: 0 mL / OUT: 555 mL / NET: -555 mL          PHYSICAL EXAM:  Constitutional: NAD  Neck: No JVD  Respiratory: CTAB, no wheezes, rales or rhonchi  Cardiovascular: S1, S2, RRR  Gastrointestinal: BS+, soft, NT/ND  Extremities: No peripheral edema    Hospital Medications:   MEDICATIONS  (STANDING):  atorvastatin 40 milliGRAM(s) Oral at bedtime  chlorhexidine 2% Cloths 1 Application(s) Topical daily  dextrose 5%. 1000 milliLiter(s) (50 mL/Hr) IV Continuous <Continuous>  dextrose 5%. 1000 milliLiter(s) (100 mL/Hr) IV Continuous <Continuous>  dextrose 50% Injectable 25 Gram(s) IV Push once  dextrose 50% Injectable 12.5 Gram(s) IV Push once  dextrose 50% Injectable 25 Gram(s) IV Push once  diVALproex  milliGRAM(s) Oral <User Schedule>  diVALproex  milliGRAM(s) Oral <User Schedule>  epoetin cortney-epbx (RETACRIT) Injectable 85172 Unit(s) SubCutaneous every 7 days  fluconAZOLE IVPB 200 milliGRAM(s) IV Intermittent every 24 hours  furosemide   Injectable 80 milliGRAM(s) IV Push two times a day  glucagon  Injectable 1 milliGRAM(s) IntraMuscular once  insulin glargine Injectable (LANTUS) 6 Unit(s) SubCutaneous at bedtime  insulin lispro (ADMELOG) corrective regimen sliding scale   SubCutaneous three times a day before meals  insulin lispro (ADMELOG) corrective regimen sliding scale   SubCutaneous at bedtime  insulin lispro Injectable (ADMELOG) 4 Unit(s) SubCutaneous three times a day before meals  levETIRAcetam 250 milliGRAM(s) Oral every 12 hours  levothyroxine 50 MICROGram(s) Oral daily  magnesium sulfate  IVPB 2 Gram(s) IV Intermittent once  mycophenolate mofetil 1000 milliGRAM(s) Oral <User Schedule>  pantoprazole    Tablet 40 milliGRAM(s) Oral before breakfast  piperacillin/tazobactam IVPB.. 3.375 Gram(s) IV Intermittent every 12 hours  polyethylene glycol 3350 17 Gram(s) Oral daily  predniSONE   Tablet 5 milliGRAM(s) Oral daily  senna 2 Tablet(s) Oral at bedtime  sevelamer carbonate 800 milliGRAM(s) Oral three times a day with meals  sodium bicarbonate 1300 milliGRAM(s) Oral every 12 hours  trimethoprim   80 mG/sulfamethoxazole 400 mG 1 Tablet(s) Oral daily  valGANciclovir 450 milliGRAM(s) Oral <User Schedule>    Tacrolimus (), Serum: 8.9 ng/mL (05-04 @ 04:35)    LABS:  05-04    131<L>  |  91<L>  |  72<H>  ----------------------------<  185<H>  4.4   |  27  |  4.14<H>    Ca    8.2<L>      04 May 2022 04:27  Phos  4.2     05-04  Mg     1.7     05-04    TPro  5.5<L>  /  Alb  2.4<L>  /  TBili  0.4  /  DBili      /  AST  28  /  ALT  15  /  AlkPhos  68  05-04    Creatinine Trend: 4.14 <--, 3.88 <--, 3.89 <--, 3.99 <--, 3.95 <--, 3.23 <--, 2.52 <--, 3.68 <--, 3.44 <--    Albumin, Serum: 2.4 g/dL (05-04 @ 04:27)  Phosphorus Level, Serum: 4.2 mg/dL (05-04 @ 04:27)                              6.9    12.45 )-----------( 186      ( 04 May 2022 04:25 )             20.8     Urine Studies:      Iron 17, TIBC 171, %sat 10      [05-02-22 @ 13:03]  Ferritin 1505      [05-02-22 @ 13:05]  PTH -- (Ca 9.2)      [02-05-22 @ 10:13]   294  HbA1c 6.0      [02-21-20 @ 08:53]  Lipid: chol 118, TG 54, HDL 59, LDL --      [06-27-21 @ 09:44]    HBsAb 40264.2      [04-21-22 @ 14:13]  HBsAg Nonreact      [04-21-22 @ 14:13]  HBcAb Reactive      [04-21-22 @ 14:13]  HCV 0.25, Nonreact      [04-21-22 @ 14:13]  HIV Nonreact      [04-21-22 @ 14:47]      RADIOLOGY & ADDITIONAL STUDIES:

## 2022-05-04 NOTE — DIETITIAN INITIAL EVALUATION ADULT - WEIGHT USED FOR CALCULATIONS
PLAN:    Continue:  -Lamotrigine 75 mg twice daily    Labs: Lamotrigine level, Vit-D, B-Met, CBC, Folic Acid    Consider resuming CBD Oil for seizure, sleep and mood    RETURN TO CLINIC IN 6 MONTHS FOR FOLLOW UP    CALL CLINIC ANYTIME WITH QUESTIONS, CONCERNS OR ANY NEW/WORSENING OF YOUR SYMPTOMS    Ok to continue driving due to full awareness with having seizure activity     SEIZURE PRECAUTIONS AS DISCUSSED: NO DRIVING FOR 3 MONTHS FOLLOWING LAST SEIZURE OR UNCONSCIOUS SPELL. MUST REPORT SEIZURE TO [place] DMV. NO SWIMMING, BATHING IN A TUB, CLIMBING TO HEIGHTS OR OPERATING MACHINERY UNTIL CLEARED BY NEUROLOGY.    WILL CONSIDER INCREASED LAMOTRIGINE DOSE IF BLOOD LEVELS REMAIN SUBTHERAPEUTIC DESPITE APPROPRIATE LMTG DOSING AND OR IF RAND/ABDOMINAL TIGHTNESS CONTINUES    Pt was given opportunity to ask questions and questions were answered. Pt verbalizde full understanding of diagnosis, treatment rational and treatment plan and is in agreement with treatment plan.     
current weight

## 2022-05-05 NOTE — PROGRESS NOTE ADULT - SUBJECTIVE AND OBJECTIVE BOX
Follow Up:      Interval History:    REVIEW OF SYSTEMS  [  ] ROS unobtainable because:    [  ] All other systems negative except as noted below    Constitutional:  [ ] fever [ ] chills  [ ] weight loss  [ ] weakness  Skin:  [ ] rash [ ] phlebitis	  Eyes: [ ] icterus [ ] pain  [ ] discharge	  ENMT: [ ] sore throat  [ ] thrush [ ] ulcers [ ] exudates  Respiratory: [ ] dyspnea [ ] hemoptysis [ ] cough [ ] sputum	  Cardiovascular:  [ ] chest pain [ ] palpitations [ ] edema	  Gastrointestinal:  [ ] nausea [ ] vomiting [ ] diarrhea [ ] constipation [ ] pain	  Genitourinary:  [ ] dysuria [ ] frequency [ ] hematuria [ ] discharge [ ] flank pain  [ ] incontinence  Musculoskeletal:  [ ] myalgias [ ] arthralgias [ ] arthritis  [ ] back pain  Neurological:  [ ] headache [ ] seizures  [ ] confusion/altered mental status    Allergies  No Known Allergies        ANTIMICROBIALS:  trimethoprim   80 mG/sulfamethoxazole 400 mG 1 daily  valGANciclovir 450 <User Schedule>      OTHER MEDS:  MEDICATIONS  (STANDING):  acetaminophen     Tablet .. 650 every 6 hours PRN  atorvastatin 40 at bedtime  carvedilol 3.125 two times a day  dextrose 50% Injectable 25 once  dextrose 50% Injectable 12.5 once  dextrose 50% Injectable 25 once  dextrose Oral Gel 15 once PRN  diVALproex  <User Schedule>  diVALproex  <User Schedule>  epoetin cortney-epbx (RETACRIT) Injectable 87808 every 7 days  furosemide   Injectable 80 two times a day  glucagon  Injectable 1 once  insulin glargine Injectable (LANTUS) 6 at bedtime  insulin lispro (ADMELOG) corrective regimen sliding scale  three times a day before meals  insulin lispro (ADMELOG) corrective regimen sliding scale  at bedtime  insulin lispro Injectable (ADMELOG) 4 three times a day before meals  levETIRAcetam 250 every 12 hours  levothyroxine 50 daily  mycophenolate mofetil 1000 <User Schedule>  pantoprazole    Tablet 40 before breakfast  polyethylene glycol 3350 17 daily  predniSONE   Tablet 5 daily  senna 2 at bedtime      Vital Signs Last 24 Hrs  T(C): 36.7 (05 May 2022 12:51), Max: 37 (05 May 2022 01:00)  T(F): 98.1 (05 May 2022 12:51), Max: 98.6 (05 May 2022 01:00)  HR: 68 (05 May 2022 12:51) (68 - 82)  BP: 159/71 (05 May 2022 12:51) (153/71 - 176/77)  BP(mean): 105 (05 May 2022 09:00) (102 - 108)  RR: 20 (05 May 2022 12:51) (18 - 20)  SpO2: 99% (05 May 2022 12:51) (95% - 100%)    PHYSICAL EXAMINATION:  General: Alert and Awake, NAD  HEENT: PERRL, EOMI  Neck: Supple  Cardiac: RRR, No M/R/G  Resp: CTAB, No Wh/Rh/Ra  Abdomen: NBS, NT/ND, No HSM, No rigidity or guarding  MSK: No LE edema. No Calf tenderness  : No tucker  Skin: No rashes or lesions. Skin is warm and dry to the touch.   Neuro: Alert and Awake. CN 2-12 Grossly intact. Moves all four extremities spontaneously.  Psych: Calm, Pleasant, Cooperative                          9.1    14.30 )-----------( 165      ( 05 May 2022 06:39 )             27.6       05-05    132<L>  |  91<L>  |  72<H>  ----------------------------<  108<H>  5.1   |  23  |  3.99<H>    Ca    8.5      05 May 2022 06:39  Phos  3.5     05-05  Mg     1.8     05-05    TPro  5.8<L>  /  Alb  2.2<L>  /  TBili  0.5  /  DBili  x   /  AST  28  /  ALT  7<L>  /  AlkPhos  70  05-05          MICROBIOLOGY:  v  .Body Fluid BLOOD CLOT  05-03-22   No growth to date.  --    No polymorphonuclear cells seen per low power field  No organisms seen per oil power field                RADIOLOGY:    <The imaging below has been reviewed and visualized by me independently. Findings as detailed in report below> Follow Up:  fluid collection    Interval History: afebrile. continued pain at the right groin.     REVIEW OF SYSTEMS  [  ] ROS unobtainable because:    [ x ] All other systems negative except as noted below    Constitutional:  [ ] fever [ ] chills  [ ] weight loss  [ ] weakness  Skin:  [ ] rash [ ] phlebitis	  Eyes: [ ] icterus [ ] pain  [ ] discharge	  ENMT: [ ] sore throat  [ ] thrush [ ] ulcers [ ] exudates  Respiratory: [ ] dyspnea [ ] hemoptysis [ ] cough [ ] sputum	  Cardiovascular:  [ ] chest pain [ ] palpitations [ ] edema	  Gastrointestinal:  [ ] nausea [ ] vomiting [ ] diarrhea [ ] constipation [x ] pain	  Genitourinary:  [ ] dysuria [ ] frequency [ ] hematuria [ ] discharge [ ] flank pain  [ ] incontinence  Musculoskeletal:  [ ] myalgias [ ] arthralgias [ ] arthritis  [ ] back pain  Neurological:  [ ] headache [ ] seizures  [ ] confusion/altered mental status      Allergies  No Known Allergies        ANTIMICROBIALS:  trimethoprim   80 mG/sulfamethoxazole 400 mG 1 daily  valGANciclovir 450 <User Schedule>      OTHER MEDS:  MEDICATIONS  (STANDING):  acetaminophen     Tablet .. 650 every 6 hours PRN  atorvastatin 40 at bedtime  carvedilol 3.125 two times a day  dextrose 50% Injectable 25 once  dextrose 50% Injectable 12.5 once  dextrose 50% Injectable 25 once  dextrose Oral Gel 15 once PRN  diVALproex  <User Schedule>  diVALproex  <User Schedule>  epoetin cortney-epbx (RETACRIT) Injectable 82245 every 7 days  furosemide   Injectable 80 two times a day  glucagon  Injectable 1 once  insulin glargine Injectable (LANTUS) 6 at bedtime  insulin lispro (ADMELOG) corrective regimen sliding scale  three times a day before meals  insulin lispro (ADMELOG) corrective regimen sliding scale  at bedtime  insulin lispro Injectable (ADMELOG) 4 three times a day before meals  levETIRAcetam 250 every 12 hours  levothyroxine 50 daily  mycophenolate mofetil 1000 <User Schedule>  pantoprazole    Tablet 40 before breakfast  polyethylene glycol 3350 17 daily  predniSONE   Tablet 5 daily  senna 2 at bedtime      Vital Signs Last 24 Hrs  T(C): 36.7 (05 May 2022 12:51), Max: 37 (05 May 2022 01:00)  T(F): 98.1 (05 May 2022 12:51), Max: 98.6 (05 May 2022 01:00)  HR: 68 (05 May 2022 12:51) (68 - 82)  BP: 159/71 (05 May 2022 12:51) (153/71 - 176/77)  BP(mean): 105 (05 May 2022 09:00) (102 - 108)  RR: 20 (05 May 2022 12:51) (18 - 20)  SpO2: 99% (05 May 2022 12:51) (95% - 100%)    PHYSICAL EXAMINATION:  General: Alert and Awake, NAD  Cardiac: RRR, No M/R/G  Resp: CTAB, No Wh/Rh/Ra  Abdomen: RLQ renal transplant surgical site with no erythema, drainage. ALINA drain with serosanguinous drainage. Moderate RLQ tenderness to palpation.   MSK: No LE edema. No Calf tenderness  Skin: No rashes or lesions. Skin is warm and dry to the touch.   Neuro: Alert and Awake. CN 2-12 Grossly intact. Moves all four extremities spontaneously.  Psych: Calm, Pleasant, Cooperative                            9.1    14.30 )-----------( 165      ( 05 May 2022 06:39 )             27.6       05-05    132<L>  |  91<L>  |  72<H>  ----------------------------<  108<H>  5.1   |  23  |  3.99<H>    Ca    8.5      05 May 2022 06:39  Phos  3.5     05-05  Mg     1.8     05-05    TPro  5.8<L>  /  Alb  2.2<L>  /  TBili  0.5  /  DBili  x   /  AST  28  /  ALT  7<L>  /  AlkPhos  70  05-05          MICROBIOLOGY:  v  .Body Fluid BLOOD CLOT  05-03-22   No growth to date.  --    No polymorphonuclear cells seen per low power field  No organisms seen per oil power field                RADIOLOGY:    <The imaging below has been reviewed and visualized by me independently. Findings as detailed in report below>    < from: CT Abdomen and Pelvis No Cont (05.02.22 @ 12:37) >  IMPRESSION:  Right lower quadrant transplant kidney.   Large right extraperitoneal   hematoma, displacing the transplant kidney medially and displacing bowel   to the left. An additional higher attenuation component is seen within   the right pelvis, which may represent more recent bleeding.    < end of copied text >

## 2022-05-05 NOTE — PROGRESS NOTE ADULT - SUBJECTIVE AND OBJECTIVE BOX
Transplant Surgery - Multidisciplinary Rounds  --------------------------------------------------------------  DDRT Date: 4/21/22    66M with history of HTN, HLD, DM (on insulin), ESRD (2/2 DM and HTN) on HD via permacath since 3yrs ago MWF, Afib (on Eliquis 2.5mg BID), hx of stroke (2019 2/2 afib), focal seizure (on depakote, keppra), hx of CABG (2020), hx of nonbleeding gastric/duodenal ulcer (EGD 2/9/22, on protonix)    s/p R DCD DDRT (1A/1V/1U stented) on 4/21/2022 with Thymoglobulin Induction   - Donor: 56 y/o M, KDPI 82%, PHS high risk, terminal Cr 1.34, CMV pos, EBV pos, HCV OPAL neg)   - Graft: post op renal doppler with  good perfusion; course c/b delayed graft function requiring HD inpatient and on discharge.  - A fib: resumed Eliquis; Plavix on HOLD    -Readmitted on 4/28 with weakness, anemia  -s/p OR evacuation of hematoma and repair of bleeding by arterial anastomosis, graft bx on 5/2    Interval Events:  -POD#3 s/p OR hematoma evacuation  -h/h stable  -ALINA ~600 ss output/24h, no concern for active bleed or urine leak  -generalized weakness improving, though OOB with heavy assist  -edema with slow improvement while on Lasix  -graft: improving DGF, last HD on 4/29. UO, weight and Cr improving daily  -rest of VSS; Afebrile on Zosyn/Fluc (started post-op)    Immunosuppression:  -Envarsus per level, MMF 1/1, Pred 5  -ongoing monitoring for signs of rejection    Potential Discharge date: next 24-48H    Education:  Medications    Plan of care:  See Below        MEDICATIONS  (STANDING):  atorvastatin 40 milliGRAM(s) Oral at bedtime  carvedilol 3.125 milliGRAM(s) Oral two times a day  chlorhexidine 2% Cloths 1 Application(s) Topical daily  dextrose 5%. 1000 milliLiter(s) (50 mL/Hr) IV Continuous <Continuous>  dextrose 5%. 1000 milliLiter(s) (100 mL/Hr) IV Continuous <Continuous>  dextrose 50% Injectable 25 Gram(s) IV Push once  dextrose 50% Injectable 12.5 Gram(s) IV Push once  dextrose 50% Injectable 25 Gram(s) IV Push once  diVALproex  milliGRAM(s) Oral <User Schedule>  diVALproex  milliGRAM(s) Oral <User Schedule>  epoetin cortney-epbx (RETACRIT) Injectable 83226 Unit(s) SubCutaneous every 7 days  furosemide   Injectable 80 milliGRAM(s) IV Push two times a day  glucagon  Injectable 1 milliGRAM(s) IntraMuscular once  insulin glargine Injectable (LANTUS) 6 Unit(s) SubCutaneous at bedtime  insulin lispro (ADMELOG) corrective regimen sliding scale   SubCutaneous three times a day before meals  insulin lispro (ADMELOG) corrective regimen sliding scale   SubCutaneous at bedtime  insulin lispro Injectable (ADMELOG) 4 Unit(s) SubCutaneous three times a day before meals  levETIRAcetam 250 milliGRAM(s) Oral every 12 hours  levothyroxine 50 MICROGram(s) Oral daily  mycophenolate mofetil 1000 milliGRAM(s) Oral <User Schedule>  pantoprazole    Tablet 40 milliGRAM(s) Oral before breakfast  polyethylene glycol 3350 17 Gram(s) Oral daily  predniSONE   Tablet 5 milliGRAM(s) Oral daily  senna 2 Tablet(s) Oral at bedtime  sevelamer carbonate 800 milliGRAM(s) Oral three times a day with meals  sodium bicarbonate 1300 milliGRAM(s) Oral every 12 hours  trimethoprim   80 mG/sulfamethoxazole 400 mG 1 Tablet(s) Oral daily  valGANciclovir 450 milliGRAM(s) Oral <User Schedule>    MEDICATIONS  (PRN):  dextrose Oral Gel 15 Gram(s) Oral once PRN Blood Glucose LESS THAN 70 milliGRAM(s)/deciliter        Vital Signs Last 24 Hrs  T(C): 36.6 (05 May 2022 08:37), Max: 37 (05 May 2022 01:00)  T(F): 97.9 (05 May 2022 08:37), Max: 98.6 (05 May 2022 01:00)  HR: 72 (05 May 2022 10:28) (72 - 82)  BP: 167/79 (05 May 2022 10:28) (153/71 - 176/77)  BP(mean): 105 (05 May 2022 09:00) (102 - 110)  RR: 20 (05 May 2022 08:37) (18 - 20)  SpO2: 97% (05 May 2022 09:00) (95% - 100%)    I&O's Summary    04 May 2022 07:01  -  05 May 2022 07:00  --------------------------------------------------------  IN: 1040 mL / OUT: 3075 mL / NET: -2035 mL    05 May 2022 07:01  -  05 May 2022 12:06  --------------------------------------------------------  IN: 240 mL / OUT: 250 mL / NET: -10 mL                              9.1    14.30 )-----------( 165      ( 05 May 2022 06:39 )             27.6     05-05    132<L>  |  91<L>  |  72<H>  ----------------------------<  108<H>  5.1   |  23  |  3.99<H>    Ca    8.5      05 May 2022 06:39  Phos  3.5     05-05  Mg     1.8     05-05    TPro  5.8<L>  /  Alb  2.2<L>  /  TBili  0.5  /  DBili  x   /  AST  28  /  ALT  7<L>  /  AlkPhos  70  05-05    Tacrolimus (), Serum: 8.9 ng/mL (05-04 @ 04:35)        Culture - Body Fluid with Gram Stain (collected 05-03-22 @ 02:12)  Source: .Body Fluid BLOOD CLOT  Gram Stain (05-03-22 @ 06:37):    No polymorphonuclear cells seen per low power field    No organisms seen per oil power field  Preliminary Report (05-04-22 @ 11:00):    No growth to date.                  Review of Systems: Gen: No weight changes, fatigue, fevers/chills, +weakness  Skin: No rashes  Head/Eyes/Ears/Mouth: No headache; Normal hearing; Normal vision w/o blurriness; No sinus pain/discomfort, sore throat  Respiratory: No dyspnea, cough, wheezing, hemoptysis  CV: No chest pain, PND, orthopnea  GI: mild abdominal pain, no diarrhea, constipation, nausea, vomiting, melena, hematochezia  : No increased frequency, dysuria, hematuria, nocturia  MSK: No joint pain/swelling; no back pain; no edema  Neuro: No dizziness/lightheadedness, weakness, seizures, numbness, tingling  Heme: No easy bruising or bleeding  Endo: No heat/cold intolerance  Psych: No significant nervousness, anxiety, stress, depression  All other systems were reviewed and are negative, except as noted    Physical Exam:   Constitutional: Well developed / well nourished  Eyes: Anicteric, PERRLA  ENMT: nc/at  Neck: Supple  Respiratory: CTA B/L  Cardiovascular: RRR  Gastrointestinal: Soft abdomen, appropriate incisional TTP.  incision c/d/i.  ALINA x1 ss  Genitourinary:  good UO  Extremities: SCD's in place and working bilaterally. +b/l LE 1+ pitting edema. +scrotal edema--all improving  Vascular: Palpable dp pulses bilaterally  Neurological: A&O x3  Musculoskeletal: Moving all extremities, generalized weakness  Psychiatric: Responsive

## 2022-05-05 NOTE — PROGRESS NOTE ADULT - ASSESSMENT
66 yr old man with ESRD due to DM on HD since 2019 presenting for DDRT. Pt underwent DDRT on 4/21/22 with DGF and HD dependant presenting with increased lethargy and symptomatic severe anemia with hematomas around allograft. Primary Nephrologist Dr. Rey/Iliana.   Donor: 58 year old male, KDPI 82%, PHS high risk, terminal Cr 1.34, CMV pos, EBV pos, HCV OPAL neg)     1. s/p DDRT on 4/21/22 -  Allograft function with DGF and HD dependant. Last HD done on 4/29/22. On Lasix 80 mg IV bid. UOP was 2.6L and net negative 2L in the last 24 hrs. Weight is same as on admission.  will hold off IHD    2.Immunosuppression- s/p Thymo induction. On Envarsus adjust as per levels. target goal 8-10, Continue Cellcept 1 gm BID and Prednisone 5 mg po daily .Infection ppx with  Fluconazole , bactrim, Valcyte. On Fluc and Zosyn for infection ppx due to reopening in OR on 5/2. Zosyn can be d/c as per ID recs    3. Seizures- Continue Depakote and Keppra.  4.Hypertension-   on Amlodipine. restart  Coreg 3.125 mg po bid   5.DM - On insulin.  6.Anemia - CT A/P revealed a large hematoma. s/p hematoma evacuation and repair of arterial anastomosis on 5/2. . Hb is is stable today . s/p 2 units of PRBC    Eliquis on hold  . Will keep hemoglobin > 8 given hx of CAD.

## 2022-05-05 NOTE — PROGRESS NOTE ADULT - ASSESSMENT
66 year old male PMH ESRD (2/2 DM and HTN) s/p DCD DDRT (CMV +/+, EBV +/+) on 4/21/202, HTN, HLD, DM (on insulin), A-fib (on eliquis 2.5mg BID), hx of stroke (2019 2/2 afib), focal seizure (on depakote, keppra), hx of CABG (2020), hx of nonbleeding gastric/duodenal ulcer (EGD 2/9/22, on protonix) who presented to [facility] as his family was not able take care of patient at home.     CT A/P (5/2) with Large right extraperitoneal hematoma, displacing the transplant kidney medially and displacing bowel to the left.     On 5/2 s/p OR hematoma evaluation.     At this point do not have high suspicion for superinfection of hematoma  Cultures so far with no growth    #Hematoma, Leukocytosis  --Can stop Zosyn and Fluconazole as cultures are unrevealing so far  --Continue to follow CBC with diff  --Continue to follow temperature curve  --Follow up on preliminary OR culture    #Renal Transplant Recipient (CMV +/+, EBV +/+)   --Continue Valcyte 450 mg M/W/F  --Continue Bactrim for PCP PPx    I will continue to follow. Please feel free to contact me with any further questions.    Carlton Hoover M.D.  [facility] Division of Infectious Disease  8AM-5PM Monday - Friday: Available on [employer] Teams  After Hours and Holidays (or if no response on [employer] Teams): Please contact the Infectious Diseases Office at [phone]    The above assessment and plan were discussed with transplant surgery team

## 2022-05-05 NOTE — PROGRESS NOTE ADULT - SUBJECTIVE AND OBJECTIVE BOX
[facility] DIVISION OF KIDNEY DISEASES AND HYPERTENSION -- FOLLOW UP NOTE  --------------------------------------------------------------------------------  Chief Complaint:    24 hour events/subjective:  Patient was seen and examined at bedside      PAST HISTORY  --------------------------------------------------------------------------------  No significant changes to PMH, PSH, FHx, SHx, unless otherwise noted    ALLERGIES & MEDICATIONS  --------------------------------------------------------------------------------  Allergies    No Known Allergies    Intolerances      Standing Inpatient Medications  atorvastatin 40 milliGRAM(s) Oral at bedtime  chlorhexidine 2% Cloths 1 Application(s) Topical daily  dextrose 5%. 1000 milliLiter(s) IV Continuous <Continuous>  dextrose 5%. 1000 milliLiter(s) IV Continuous <Continuous>  dextrose 50% Injectable 25 Gram(s) IV Push once  dextrose 50% Injectable 12.5 Gram(s) IV Push once  dextrose 50% Injectable 25 Gram(s) IV Push once  diVALproex  milliGRAM(s) Oral <User Schedule>  diVALproex  milliGRAM(s) Oral <User Schedule>  epoetin cortney-epbx (RETACRIT) Injectable 56351 Unit(s) SubCutaneous every 7 days  fluconAZOLE IVPB 200 milliGRAM(s) IV Intermittent every 24 hours  furosemide   Injectable 80 milliGRAM(s) IV Push two times a day  glucagon  Injectable 1 milliGRAM(s) IntraMuscular once  insulin glargine Injectable (LANTUS) 6 Unit(s) SubCutaneous at bedtime  insulin lispro (ADMELOG) corrective regimen sliding scale   SubCutaneous at bedtime  insulin lispro (ADMELOG) corrective regimen sliding scale   SubCutaneous three times a day before meals  insulin lispro Injectable (ADMELOG) 4 Unit(s) SubCutaneous three times a day before meals  levETIRAcetam 250 milliGRAM(s) Oral every 12 hours  levothyroxine 50 MICROGram(s) Oral daily  mycophenolate mofetil 1000 milliGRAM(s) Oral <User Schedule>  pantoprazole    Tablet 40 milliGRAM(s) Oral before breakfast  piperacillin/tazobactam IVPB.. 3.375 Gram(s) IV Intermittent every 12 hours  polyethylene glycol 3350 17 Gram(s) Oral daily  predniSONE   Tablet 5 milliGRAM(s) Oral daily  senna 2 Tablet(s) Oral at bedtime  sevelamer carbonate 800 milliGRAM(s) Oral three times a day with meals  sodium bicarbonate 1300 milliGRAM(s) Oral every 12 hours  trimethoprim   80 mG/sulfamethoxazole 400 mG 1 Tablet(s) Oral daily  valGANciclovir 450 milliGRAM(s) Oral <User Schedule>    PRN Inpatient Medications  dextrose Oral Gel 15 Gram(s) Oral once PRN      REVIEW OF SYSTEMS  --------------------------------------------------------------------------------  Gen: No fatigue, fevers/chills, weakness  Skin: No rashes  Head/Eyes/Ears/Mouth: No headache;No sore throat  Respiratory: No dyspnea, cough,   CV: No chest pain, PND, orthopnea  GI: No abdominal pain, diarrhea, constipation, nausea, vomiting  Transplant: No pain  : No increased frequency, dysuria, hematuria, nocturia  MSK: No joint pain/swelling; no back pain; no edema  Neuro: No dizziness/lightheadedness, weakness, seizures, numbness, tingling  Psych: No significant nervousness, anxiety, stress, depression    All other systems were reviewed and are negative, except as noted.    VITALS/PHYSICAL EXAM  --------------------------------------------------------------------------------  T(C): 36.9 (05-05-22 @ 05:00), Max: 37 (05-05-22 @ 01:00)  HR: 82 (05-05-22 @ 05:00) (73 - 88)  BP: 170/71 (05-05-22 @ 05:00) (153/68 - 176/75)  RR: 18 (05-05-22 @ 05:00) (18 - 20)  SpO2: 95% (05-05-22 @ 05:00) (95% - 100%)  Wt(kg): --        05-04-22 @ 07:01  -  05-05-22 @ 07:00  --------------------------------------------------------  IN: 1040 mL / OUT: 3075 mL / NET: -2035 mL      Physical Exam:  	Gen: NAD  	HEENT: PERRL, supple neck, clear oropharynx  	Pulm: CTA B/L  	CV: RRR, S1S2; no rub  	Back: No spinal or CVA tenderness; no sacral edema  	Abd: +BS, soft, nontender/nondistended                      Transplant: No tenderness, swelling  	: No suprapubic tenderness  	UE: Warm, FROM; no edema; no asterixis  	LE: Warm, FROM; no edema  	Neuro: No focal deficits  	Psych: Normal affect and mood  	Skin: Warm, without rashes      LABS/STUDIES  --------------------------------------------------------------------------------              9.1    14.30 >-----------<  165      [05-05-22 @ 06:39]              27.6     132  |  91  |  72  ----------------------------<  108      [05-05-22 @ 06:39]  5.1   |  23  |  3.99        Ca     8.5     [05-05-22 @ 06:39]      Mg     1.8     [05-05-22 @ 06:39]      Phos  3.5     [05-05-22 @ 06:39]    TPro  5.8  /  Alb  2.2  /  TBili  0.5  /  DBili  x   /  AST  28  /  ALT  7   /  AlkPhos  70  [05-05-22 @ 06:39]              [05-04-22 @ 04:27]    Creatinine Trend:  SCr 3.99 [05-05 @ 06:39]  SCr 4.14 [05-04 @ 04:27]  SCr 3.88 [05-03 @ 06:47]  SCr 3.89 [05-02 @ 22:05]  SCr 3.99 [05-02 @ 19:28]    Tacrolimus (), Serum: 8.9 ng/mL (05-04 @ 04:35)  Tacrolimus (), Serum: 8.7 ng/mL (05-03 @ 06:48)  Tacrolimus (), Serum: 5.3 ng/mL (05-02 @ 07:58)  Tacrolimus (), Serum: 6.5 ng/mL (05-01 @ 08:32)                Iron 17, TIBC 171, %sat 10      [05-02-22 @ 13:03]  Ferritin 1505      [05-02-22 @ 13:05]  PTH -- (Ca 9.2)      [02-05-22 @ 10:13]   294  HbA1c 6.0      [02-21-20 @ 08:53]  Lipid: chol 118, TG 54, HDL 59, LDL --      [06-27-21 @ 09:44]    HBsAb 71050.2      [04-21-22 @ 14:13]  HBsAg Nonreact      [04-21-22 @ 14:13]  HBcAb Reactive      [04-21-22 @ 14:13]  HCV 0.25, Nonreact      [04-21-22 @ 14:13]  HIV Nonreact      [04-21-22 @ 14:47]

## 2022-05-05 NOTE — PROGRESS NOTE ADULT - ASSESSMENT
66M with history of HTN, HLD, DM (on insulin), ESRD (2/2 DM and HTN) on HD via permacath since 3yrs ago MWF, afib (on eliquis 2.5mg BID), hx of stroke (2019 2/2 afib), focal seizure (on depakote, keppra), hx of CABG (2020), hx of nonbleeding gastric/duodenal ulcer (EGD 2/9/22, on protonix), s/p DCD KTX, came back due to weakness    s/p  DCD KTX ( 1A/1V/1U stented) on 4/21/2022 with Thymoglobulin Induction   - Donor: Donor: 56 y/o M, KDPI 82%, PHS high risk, terminal Cr 1.34, CMV pos, EBV pos, HCV OPAL neg)   - Graft: post op renal doppler with  good perfusion; course c/b delayed graft function requiring HD    - last HD 03/29/22, HD per transplant team   - on Lasix 80 mg IV bid.05/03/22  - Immunosuppressant per transplant team   - (CMV +/+, EBV +/+)   -Continue Valcyte 450 mg M/W/F  -Continue Bactrim for PCP PPx    - His regular schedule as outpatient is MWF Held HD today. Kidney function improving.   - Monitor BMP,  urine output    HTN:  elevated   -restart coreg as recommend by transplant team   -Monitor BP closely     Anemia:  monitor Hgb   CT shows  Large right extraperitoneal hematoma, displacing the transplant kidney medially and displacing bowel   to the left. An additional higher attenuation component is seen within   the right pelvis, which may represent more recent bleeding.  blood transfusion as per transplant team     CKD; MBD:  Calcium corrected is normal  monitor Po4, Ca      66M with history of HTN, HLD, DM (on insulin), ESRD (2/2 DM and HTN) on HD via permacath since 3yrs ago MWF, afib (on eliquis 2.5mg BID), hx of stroke (2019 2/2 afib), focal seizure (on depakote, keppra), hx of CABG (2020), hx of nonbleeding gastric/duodenal ulcer (EGD 2/9/22, on protonix), s/p DCD KTX, came back due to weakness    s/p  DCD KTX ( 1A/1V/1U stented) on 4/21/2022 with Thymoglobulin Induction   - Donor: Donor: 56 y/o M, KDPI 82%, PHS high risk, terminal Cr 1.34, CMV pos, EBV pos, HCV OPAL neg)   - Graft: post op renal doppler with  good perfusion; course c/b delayed graft function requiring HD    - last HD 03/29/22, HD per transplant team   - on Lasix 80 mg IV bid.05/03/22  - Immunosuppressant per transplant team   - (CMV +/+, EBV +/+)   -Continue Valcyte 450 mg M/W/F  -Continue Bactrim for PCP PPx    - His regular schedule as outpatient is MWF Held HD today. Kidney function improving. Last HD was on 4/29/2022  - Monitor BMP,  urine output    HTN:  elevated   -restart coreg as recommend by transplant team   -Monitor BP closely     Anemia:  monitor Hgb   CT shows  Large right extraperitoneal hematoma, displacing the transplant kidney medially and displacing bowel   to the left. An additional higher attenuation component is seen within   the right pelvis, which may represent more recent bleeding.  blood transfusion as per transplant team     CKD; MBD:  Calcium corrected is normal  monitor Po4, Ca

## 2022-05-05 NOTE — PROGRESS NOTE ADULT - SUBJECTIVE AND OBJECTIVE BOX
[facility] [place] [facility]   MD NINA MASON MD, PA KRISTINE SOLTANPOUR, DO INJUNG KO, NP    TEL:  OFFICE: [phone]    From 5pm-7am Answering Service [phone]    -- RENAL FOLLOW UP NOTE ---Date of Service 05-05-22 @ 08:45    Patient is a 66y old  Male who presents with a chief complaint of Weakness (05 May 2022 08:36)      Patient seen and examined at bedside. No chest pain/sob    VITALS:  T(F): 97.9 (05-05-22 @ 08:37), Max: 98.6 (05-05-22 @ 01:00)  HR: 76 (05-05-22 @ 08:37)  BP: 176/77 (05-05-22 @ 08:37)  RR: 20 (05-05-22 @ 08:37)  SpO2: 98% (05-05-22 @ 08:37)  Wt(kg): --    05-04 @ 07:01  -  05-05 @ 07:00  --------------------------------------------------------  IN: 1040 mL / OUT: 3075 mL / NET: -2035 mL          PHYSICAL EXAM:  Constitutional: NAD  Neck: No JVD  Respiratory: CTAB, no wheezes, rales or rhonchi  Cardiovascular: S1, S2, RRR  Gastrointestinal: BS+, soft, NT/ND  Extremities: No peripheral edema    Hospital Medications:   MEDICATIONS  (STANDING):  atorvastatin 40 milliGRAM(s) Oral at bedtime  chlorhexidine 2% Cloths 1 Application(s) Topical daily  dextrose 5%. 1000 milliLiter(s) (100 mL/Hr) IV Continuous <Continuous>  dextrose 5%. 1000 milliLiter(s) (50 mL/Hr) IV Continuous <Continuous>  dextrose 50% Injectable 25 Gram(s) IV Push once  dextrose 50% Injectable 12.5 Gram(s) IV Push once  dextrose 50% Injectable 25 Gram(s) IV Push once  diVALproex  milliGRAM(s) Oral <User Schedule>  diVALproex  milliGRAM(s) Oral <User Schedule>  epoetin cortney-epbx (RETACRIT) Injectable 25966 Unit(s) SubCutaneous every 7 days  fluconAZOLE IVPB 200 milliGRAM(s) IV Intermittent every 24 hours  furosemide   Injectable 80 milliGRAM(s) IV Push two times a day  glucagon  Injectable 1 milliGRAM(s) IntraMuscular once  insulin glargine Injectable (LANTUS) 6 Unit(s) SubCutaneous at bedtime  insulin lispro (ADMELOG) corrective regimen sliding scale   SubCutaneous three times a day before meals  insulin lispro (ADMELOG) corrective regimen sliding scale   SubCutaneous at bedtime  insulin lispro Injectable (ADMELOG) 4 Unit(s) SubCutaneous three times a day before meals  levETIRAcetam 250 milliGRAM(s) Oral every 12 hours  levothyroxine 50 MICROGram(s) Oral daily  mycophenolate mofetil 1000 milliGRAM(s) Oral <User Schedule>  pantoprazole    Tablet 40 milliGRAM(s) Oral before breakfast  piperacillin/tazobactam IVPB.. 3.375 Gram(s) IV Intermittent every 12 hours  polyethylene glycol 3350 17 Gram(s) Oral daily  predniSONE   Tablet 5 milliGRAM(s) Oral daily  senna 2 Tablet(s) Oral at bedtime  sevelamer carbonate 800 milliGRAM(s) Oral three times a day with meals  sodium bicarbonate 1300 milliGRAM(s) Oral every 12 hours  trimethoprim   80 mG/sulfamethoxazole 400 mG 1 Tablet(s) Oral daily  valGANciclovir 450 milliGRAM(s) Oral <User Schedule>      LABS:  05-05    132<L>  |  91<L>  |  72<H>  ----------------------------<  108<H>  5.1   |  23  |  3.99<H>    Ca    8.5      05 May 2022 06:39  Phos  3.5     05-05  Mg     1.8     05-05    TPro  5.8<L>  /  Alb  2.2<L>  /  TBili  0.5  /  DBili      /  AST  28  /  ALT  7<L>  /  AlkPhos  70  05-05    Creatinine Trend: 3.99 <--, 4.14 <--, 3.88 <--, 3.89 <--, 3.99 <--, 3.95 <--, 3.23 <--, 2.52 <--, 3.68 <--, 3.44 <--    Albumin, Serum: 2.2 g/dL (05-05 @ 06:39)  Phosphorus Level, Serum: 3.5 mg/dL (05-05 @ 06:39)                              9.1    14.30 )-----------( 165      ( 05 May 2022 06:39 )             27.6     Urine Studies:      Iron 17, TIBC 171, %sat 10      [05-02-22 @ 13:03]  Ferritin 1505      [05-02-22 @ 13:05]  PTH -- (Ca 9.2)      [02-05-22 @ 10:13]   294  HbA1c 6.0      [02-21-20 @ 08:53]  Lipid: chol 118, TG 54, HDL 59, LDL --      [06-27-21 @ 09:44]    HBsAb 42209.2      [04-21-22 @ 14:13]  HBsAg Nonreact      [04-21-22 @ 14:13]  HBcAb Reactive      [04-21-22 @ 14:13]  HCV 0.25, Nonreact      [04-21-22 @ 14:13]  HIV Nonreact      [04-21-22 @ 14:47]      RADIOLOGY & ADDITIONAL STUDIES:

## 2022-05-05 NOTE — PROGRESS NOTE ADULT - ASSESSMENT
66M with history of HTN, HLD, DM (on insulin), ESRD (2/2 DM and HTN) on HD via L permacath since 2019 MWF, AFib, CVA (2019 2/2 afib), seizures, CABG (2020), gastric/duodenal ulcers, s/p DCD DDRT (1A/1V/1U stented) on 4/21/2022 with Thymoglobulin Induction c/b DGF requiring HD.  Re-admitted for anemia 2/2 perinephric hematoma s/p OR evacuation on 5/2    s/p R DCD DDRT c/b perinephric hematoma s/p OR evacuation (POD#3)  -h/h stable, no concerns for active bleeding at this time  -graft: DGF, has been off HD since 4/29, Cr high 3s, but with improving UO and edema on Lasix 80/80.  Will continue to hold HD per Nephrology  -stop Zosyn/Fluc, negative OR cultures. appreciate ID following  -abdominal binder while OOB  -PT/SCDs/Spirometry  -ADAT    Immunosuppression  -Envarsus per level, MMF 1/1, Pred 5  -PPx: Nystatin, Bactrim, Valcyte (renal dosing), PPI    DM  -on Lispro/pre-meal/LUCIA, adjust prn    AFib/HTN  -Eliquis/Plavix on HOLD, will restart as able  -will restart low dose Coreg    Seizures  -continue home meds    DISPO  -if continues to remain stable, will plan for d/c to [facility] in the next 1-2 days  -will be discharged home with ALINA drain  -to f/u with Dr. Barber in 1 week 66M with history of HTN, HLD, DM (on insulin), ESRD (2/2 DM and HTN) on HD via L permacath since 2019 MWF, AFib, CVA (2019 2/2 afib), seizures, CABG (2020), gastric/duodenal ulcers, s/p DCD DDRT (1A/1V/1U stented) on 4/21/2022 with Thymoglobulin Induction c/b DGF requiring HD.  Re-admitted for anemia 2/2 perinephric hematoma s/p OR evacuation on 5/2    s/p R DCD DDRT c/b perinephric hematoma s/p OR evacuation (POD#3)  -h/h stable, no concerns for active bleeding at this time  -graft: DGF, has been off HD since 4/29, Cr high 3s, but with improving UO and edema on Lasix 80/80.  Will continue to hold HD per Nephrology  -stop Zosyn/Fluc, negative OR cultures. appreciate ID following  -abdominal binder while OOB  -PT/SCDs/Spirometry  -ADAT    Immunosuppression  -Envarsus per level, MMF 1/1, Pred 5  -PPx: Nystatin, Bactrim, Valcyte (renal dosing), PPI    DM  -on Lispro/pre-meal/LUCIA, adjust prn    AFib/HTN  -Eliquis/Plavix on HOLD, will restart as able  -will restart low dose Coreg    Seizures  -continue home meds    DISPO  -if continues to remain stable, will plan for d/c to [facility] in the next 1-2 days  -will be discharged home with ALINA drain  -to f/u with Dr. Barber in 1 week  -will need ureteral stent removed in 4-6 weeks 66M with history of HTN, HLD, DM (on insulin), ESRD (2/2 DM and HTN) on HD via L permacath since 2019 MWF, AFib, CVA (2019 2/2 afib), seizures, CABG (2020), gastric/duodenal ulcers, s/p DCD DDRT (1A/1V/1U stented) on 4/21/2022 with Thymoglobulin Induction c/b DGF requiring HD.  Re-admitted for anemia 2/2 perinephric hematoma s/p OR evacuation on 5/2    s/p R DCD DDRT c/b perinephric hematoma s/p OR evacuation (POD#3)  -h/h stable, no concerns for active bleeding at this time  -graft: DGF, has been off HD since 4/29, Cr high 3s, but with improving UO and edema on Lasix 80/80.  Will continue to hold HD per Nephrology  -graft bx intra-op negative  -stop Zosyn/Fluc, negative OR cultures. appreciate ID following  -abdominal binder while OOB  -PT/SCDs/Spirometry  -ADAT    Immunosuppression  -Envarsus per level, MMF 1/1, Pred 5  -PPx: Nystatin, Bactrim, Valcyte (renal dosing), PPI    DM  -on Lispro/pre-meal/LUCIA, adjust prn    AFib/HTN  -Eliquis/Plavix on HOLD, will restart as able  -will restart low dose Coreg    Seizures  -continue home meds    DISPO  -if continues to remain stable, will plan for d/c to [facility] in the next 1-2 days  -will be discharged home with ALINA drain  -to f/u with Dr. Barber in 1 week  -will need ureteral stent removed in 4-6 weeks

## 2022-05-06 NOTE — PROGRESS NOTE ADULT - SUBJECTIVE AND OBJECTIVE BOX
[facility] – [place] [facility]   MD NINA MASON MD RUORU WONG, PA    TEL:  FROM 9 AM to 5 PM ---OFFICE: [phone]    FROM 5 PM - 9 AM PLEASE CALL ANSWERING SERVICE: [phone]    RENAL FOLLOW UP NOTE--Date of Service 05-06-22 @ 11:34  --------------------------------------------------------------------------------  HPI:      Pt seen and examined at bedside.       PAST HISTORY  --------------------------------------------------------------------------------  No significant changes to PMH, PSH, FHx, SHx, unless otherwise noted    ALLERGIES & MEDICATIONS  --------------------------------------------------------------------------------  Allergies    No Known Allergies    Intolerances      Standing Inpatient Medications  atorvastatin 40 milliGRAM(s) Oral at bedtime  carvedilol 3.125 milliGRAM(s) Oral once  carvedilol 6.25 milliGRAM(s) Oral every 12 hours  chlorhexidine 2% Cloths 1 Application(s) Topical daily  dextrose 5%. 1000 milliLiter(s) IV Continuous <Continuous>  dextrose 5%. 1000 milliLiter(s) IV Continuous <Continuous>  dextrose 50% Injectable 25 Gram(s) IV Push once  dextrose 50% Injectable 12.5 Gram(s) IV Push once  dextrose 50% Injectable 25 Gram(s) IV Push once  diVALproex  milliGRAM(s) Oral <User Schedule>  diVALproex  milliGRAM(s) Oral <User Schedule>  epoetin cortney-epbx (RETACRIT) Injectable 26814 Unit(s) SubCutaneous every 7 days  fluconAZOLE   Tablet 200 milliGRAM(s) Oral daily  furosemide   Injectable 40 milliGRAM(s) IV Push two times a day  glucagon  Injectable 1 milliGRAM(s) IntraMuscular once  insulin glargine Injectable (LANTUS) 6 Unit(s) SubCutaneous at bedtime  insulin lispro (ADMELOG) corrective regimen sliding scale   SubCutaneous three times a day before meals  insulin lispro (ADMELOG) corrective regimen sliding scale   SubCutaneous at bedtime  insulin lispro Injectable (ADMELOG) 4 Unit(s) SubCutaneous three times a day before meals  levETIRAcetam 250 milliGRAM(s) Oral every 12 hours  levothyroxine 50 MICROGram(s) Oral daily  mycophenolate mofetil 1000 milliGRAM(s) Oral <User Schedule>  pantoprazole    Tablet 40 milliGRAM(s) Oral before breakfast  polyethylene glycol 3350 17 Gram(s) Oral daily  predniSONE   Tablet 5 milliGRAM(s) Oral daily  senna 2 Tablet(s) Oral at bedtime  trimethoprim   80 mG/sulfamethoxazole 400 mG 1 Tablet(s) Oral daily  valGANciclovir 450 milliGRAM(s) Oral <User Schedule>    PRN Inpatient Medications  acetaminophen     Tablet .. 650 milliGRAM(s) Oral every 6 hours PRN  dextrose Oral Gel 15 Gram(s) Oral once PRN      REVIEW OF SYSTEMS  --------------------------------------------------------------------------------  General: no fever  MSK: no edema     VITALS/PHYSICAL EXAM  --------------------------------------------------------------------------------  T(C): 36.7 (05-06-22 @ 05:00), Max: 36.7 (05-05-22 @ 12:51)  HR: 74 (05-06-22 @ 05:00) (67 - 74)  BP: 170/75 (05-06-22 @ 05:00) (150/65 - 170/75)  RR: 18 (05-06-22 @ 05:00) (18 - 20)  SpO2: 97% (05-06-22 @ 05:00) (97% - 100%)  Wt(kg): --        05-05-22 @ 07:01  -  05-06-22 @ 07:00  --------------------------------------------------------  IN: 960 mL / OUT: 3520 mL / NET: -2560 mL    05-06-22 @ 07:01  -  05-06-22 @ 11:34  --------------------------------------------------------  IN: 360 mL / OUT: 350 mL / NET: 10 mL      Physical Exam:  	Gen: NAD  	HEENT: MMM  	Pulm: CTA B/L  	CV: S1S2  	Abd: Soft, +BS  	Ext: No LE edema B/L                      Neuro: Awake   	Skin: Warm and Dry   	Vascular access:  HD catheter           FABIÁN tucker  LABS/STUDIES  --------------------------------------------------------------------------------              8.3    10.08 >-----------<  211      [05-06-22 @ 06:36]              24.8     132  |  90  |  76  ----------------------------<  186      [05-06-22 @ 06:35]  4.2   |  27  |  3.72        Ca     8.7     [05-06-22 @ 06:35]      Mg     2.0     [05-06-22 @ 06:35]      Phos  3.4     [05-06-22 @ 06:35]    TPro  5.7  /  Alb  2.3  /  TBili  0.3  /  DBili  x   /  AST  18  /  ALT  6   /  AlkPhos  71  [05-06-22 @ 06:35]          Creatinine Trend:  SCr 3.72 [05-06 @ 06:35]  SCr 3.99 [05-05 @ 06:39]  SCr 4.14 [05-04 @ 04:27]  SCr 3.88 [05-03 @ 06:47]  SCr 3.89 [05-02 @ 22:05]        Iron 17, TIBC 171, %sat 10      [05-02-22 @ 13:03]  Ferritin 1505      [05-02-22 @ 13:05]  PTH -- (Ca 9.2)      [02-05-22 @ 10:13]   294  HbA1c 6.0      [02-21-20 @ 08:53]  Lipid: chol 118, TG 54, HDL 59, LDL --      [06-27-21 @ 09:44]    HBsAb 21740.2      [04-21-22 @ 14:13]  HBsAg Nonreact      [04-21-22 @ 14:13]  HBcAb Reactive      [04-21-22 @ 14:13]  HCV 0.25, Nonreact      [04-21-22 @ 14:13]  HIV Nonreact      [04-21-22 @ 14:47]

## 2022-05-06 NOTE — PROGRESS NOTE ADULT - ASSESSMENT
66 yr old man with ESRD due to DM on HD since 2019 presenting for DDRT. Pt underwent DDRT on 4/21/22 with DGF and HD dependant presenting with increased lethargy and symptomatic severe anemia with hematomas around allograft. Primary Nephrologist Dr. Rey/Iliana.   Donor: 57 year old male, KDPI 82%, PHS high risk, terminal Cr 1.34, CMV pos, EBV pos, HCV OPAL neg)     1. s/p DDRT on 4/21/22 -  Allograft function with DGF and HD dependant. Last HD done on 4/29/22. Has excellent UOP of 3.5 L and net negative 2.5L in the last 24 hrs. Cr is slowly trending down. Decrease Lasix to 40 mg IV bid. Will continue  hold off IHD    2.Immunosuppression- s/p Thymo induction. On Envarsus adjust as per levels. target goal 8-10, Continue Cellcept 1 gm BID and Prednisone 5 mg po daily .Infection ppx with  Fluconazole , bactrim, Valcyte and Fluconazole ( due to  reopening in OR on 5/2)   3. Seizures- Continue Depakote and Keppra.  4.Hypertension-   on Amlodipine, increase Coreg 6.25 mg po bid   5.DM - On insulin.  6.Anemia - CT A/P revealed a large hematoma. s/p hematoma evacuation and repair of arterial anastomosis on 5/2. . Hb is is stable today .   Eliquis on hold , Will restart tomw. Will keep hemoglobin > 8 given hx of CAD.

## 2022-05-06 NOTE — PROGRESS NOTE ADULT - SUBJECTIVE AND OBJECTIVE BOX
Transplant Surgery - Multidisciplinary Rounds  --------------------------------------------------------------  DDRT Date: 4/21/22  Evacuation abd hematoma  5/2/22    66M with history of HTN, HLD, DM (on insulin), ESRD (2/2 DM and HTN) on HD via permacath since 3yrs ago MWF, Afib (on Eliquis 2.5mg BID), hx of stroke (2019 2/2 afib), focal seizure (on depakote, keppra), hx of CABG (2020), hx of nonbleeding gastric/duodenal ulcer (EGD 2/9/22, on protonix)    s/p R DCD DDRT (1A/1V/1U stented) on 4/21/2022 with Thymoglobulin Induction   - Donor: 58 y/o M, KDPI 82%, PHS high risk, terminal Cr 1.34, CMV pos, EBV pos, HCV OPAL neg)   - Graft: post op renal doppler with  good perfusion; course c/b delayed graft function requiring HD inpatient and on discharge.  - A fib: resumed Eliquis; Plavix on HOLD    -Readmitted on 4/28 with weakness, anemia  -s/p OR evacuation of hematoma and repair of bleeding by arterial anastomosis, graft bx on 5/2    Interval Events:  -POD#4 s/p OR hematoma evacuation  -h/h stable  - Decreasing ALINA output ~345 ss output/24h, no concern for active bleed or urine leak  - hypertensive coreg restarted at 3.125 bid  -generalized weakness improving, though OOB with heavy assist  -graft: improving DGF, last HD on 4/29. UO, weight and Cr improving daily  -rest of VSS; Afebrile on Zosyn/Fluc (started post-op)    Immunosuppression:  -Envarsus per level, MMF 1/1, Pred 5  -ongoing monitoring for signs of rejection    Potential Discharge date: pending clinical improvement     Education:  Medications    Plan of care:  See Below        MEDICATIONS  (STANDING):  atorvastatin 40 milliGRAM(s) Oral at bedtime  carvedilol 3.125 milliGRAM(s) Oral once  carvedilol 6.25 milliGRAM(s) Oral every 12 hours  chlorhexidine 2% Cloths 1 Application(s) Topical daily  dextrose 5%. 1000 milliLiter(s) (50 mL/Hr) IV Continuous <Continuous>  dextrose 5%. 1000 milliLiter(s) (100 mL/Hr) IV Continuous <Continuous>  dextrose 50% Injectable 25 Gram(s) IV Push once  dextrose 50% Injectable 12.5 Gram(s) IV Push once  dextrose 50% Injectable 25 Gram(s) IV Push once  diVALproex  milliGRAM(s) Oral <User Schedule>  diVALproex  milliGRAM(s) Oral <User Schedule>  epoetin cortney-epbx (RETACRIT) Injectable 41568 Unit(s) SubCutaneous every 7 days  fluconAZOLE   Tablet 200 milliGRAM(s) Oral daily  furosemide   Injectable 40 milliGRAM(s) IV Push two times a day  glucagon  Injectable 1 milliGRAM(s) IntraMuscular once  insulin glargine Injectable (LANTUS) 6 Unit(s) SubCutaneous at bedtime  insulin lispro (ADMELOG) corrective regimen sliding scale   SubCutaneous three times a day before meals  insulin lispro (ADMELOG) corrective regimen sliding scale   SubCutaneous at bedtime  insulin lispro Injectable (ADMELOG) 4 Unit(s) SubCutaneous three times a day before meals  levETIRAcetam 250 milliGRAM(s) Oral every 12 hours  levothyroxine 50 MICROGram(s) Oral daily  mycophenolate mofetil 1000 milliGRAM(s) Oral <User Schedule>  pantoprazole    Tablet 40 milliGRAM(s) Oral before breakfast  polyethylene glycol 3350 17 Gram(s) Oral daily  predniSONE   Tablet 5 milliGRAM(s) Oral daily  senna 2 Tablet(s) Oral at bedtime  trimethoprim   80 mG/sulfamethoxazole 400 mG 1 Tablet(s) Oral daily  valGANciclovir 450 milliGRAM(s) Oral <User Schedule>    MEDICATIONS  (PRN):  acetaminophen     Tablet .. 650 milliGRAM(s) Oral every 6 hours PRN Temp greater or equal to 38C (100.4F), Mild Pain (1 - 3)  dextrose Oral Gel 15 Gram(s) Oral once PRN Blood Glucose LESS THAN 70 milliGRAM(s)/deciliter      PAST MEDICAL & SURGICAL HISTORY:  Hypertension    Diabetes    Dyslipidemia    CAD (Coronary Artery Disease)  with Stents in 06/2009 and 6/2019, s/p off-pump C3L on 8/13/20    Hypothyroidism    CVA (cerebral vascular accident)  12/13/19 with residual bilateral weakness    Anemia    PEG (percutaneous endoscopic gastrostomy) status  removed July 2020    Intubation of airway performed without difficulty  Dec 2019  CVA c/b status epilepticus requiring intubation and PEG in 12/2019-    ESRD on dialysis  M/W/F    Seizures  after CVA, last seizure was last week 4/07/22    History of insertion of stent into coronary artery bypass graft  2009 and 2019    S/P CABG x 3  off pump C3L on 8/13/20        Vital Signs Last 24 Hrs  T(C): 36.7 (06 May 2022 05:00), Max: 36.7 (05 May 2022 12:51)  T(F): 98 (06 May 2022 05:00), Max: 98.1 (05 May 2022 12:51)  HR: 74 (06 May 2022 05:00) (67 - 74)  BP: 170/75 (06 May 2022 05:00) (150/65 - 170/75)  BP(mean): 99 (05 May 2022 20:35) (99 - 99)  RR: 18 (06 May 2022 05:00) (18 - 20)  SpO2: 97% (06 May 2022 05:00) (97% - 100%)    I&O's Summary    05 May 2022 07:01  -  06 May 2022 07:00  --------------------------------------------------------  IN: 960 mL / OUT: 3520 mL / NET: -2560 mL    06 May 2022 07:01  -  06 May 2022 11:47  --------------------------------------------------------  IN: 360 mL / OUT: 350 mL / NET: 10 mL          LABS:                        8.3    10.08 )-----------( 211      ( 06 May 2022 06:36 )             24.8     05-06    132<L>  |  90<L>  |  76<H>  ----------------------------<  186<H>  4.2   |  27  |  3.72<H>    Ca    8.7      06 May 2022 06:35  Phos  3.4     05-06  Mg     2.0     05-06    TPro  5.7<L>  /  Alb  2.3<L>  /  TBili  0.3  /  DBili  x   /  AST  18  /  ALT  6<L>  /  AlkPhos  71  05-06        CAPILLARY BLOOD GLUCOSE      POCT Blood Glucose.: 203 mg/dL (06 May 2022 08:55)  POCT Blood Glucose.: 172 mg/dL (05 May 2022 22:01)  POCT Blood Glucose.: 202 mg/dL (05 May 2022 17:12)  POCT Blood Glucose.: 146 mg/dL (05 May 2022 13:59)  POCT Blood Glucose.: 141 mg/dL (05 May 2022 12:30)    LIVER FUNCTIONS - ( 06 May 2022 06:35 )  Alb: 2.3 g/dL / Pro: 5.7 g/dL / ALK PHOS: 71 U/L / ALT: 6 U/L / AST: 18 U/L / GGT: x             Culture - Body Fluid with Gram Stain (collected 05-03-22 @ 02:12)  Source: .Body Fluid BLOOD CLOT  Gram Stain (05-03-22 @ 06:37):    No polymorphonuclear cells seen per low power field    No organisms seen per oil power field  Preliminary Report (05-04-22 @ 11:00):    No growth to date.      Tacrolimus (), Serum: 16.4 ng/mL (05-05 @ 06:39)      Cultures:    Culture - Body Fluid with Gram Stain (collected 05-03-22 @ 02:12)  Source: .Body Fluid BLOOD CLOT  Gram Stain (05-03-22 @ 06:37):    No polymorphonuclear cells seen per low power field    No organisms seen per oil power field  Preliminary Report (05-04-22 @ 11:00):    No growth to date.      Review of Systems: Gen: No weight changes, fatigue, fevers/chills, +weakness  Skin: No rashes  Head/Eyes/Ears/Mouth: No headache; Normal hearing; Normal vision w/o blurriness; No sinus pain/discomfort, sore throat  Respiratory: No dyspnea, cough, wheezing, hemoptysis  CV: No chest pain, PND, orthopnea  GI: mild abdominal pain, no diarrhea, constipation, nausea, vomiting, melena, hematochezia  : No increased frequency, dysuria, hematuria, nocturia  MSK: No joint pain/swelling; no back pain; no edema  Neuro: No dizziness/lightheadedness, weakness, seizures, numbness, tingling  Heme: No easy bruising or bleeding  Endo: No heat/cold intolerance  Psych: No significant nervousness, anxiety, stress, depression  All other systems were reviewed and are negative, except as noted    Physical Exam:   Constitutional: Well developed / well nourished  Eyes: Anicteric, PERRLA  ENMT: nc/at  Neck: Supple  Respiratory: CTA B/L  Cardiovascular: RRR  Gastrointestinal: Soft abdomen, appropriate incisional TTP.  incision c/d/i.  ALINA x1 ss  Genitourinary:  good UO  Extremities: SCD's in place and working bilaterally. +b/l LE 1+ pitting edema. +scrotal edema--all improving  Vascular: Palpable dp pulses bilaterally  Neurological: A&O x3  Musculoskeletal: Moving all extremities, generalized weakness  Psychiatric: Responsive

## 2022-05-06 NOTE — PROGRESS NOTE ADULT - ASSESSMENT
66M with history of HTN, HLD, DM (on insulin), ESRD (2/2 DM and HTN) on HD via permacath since 3yrs ago MWF, afib (on eliquis 2.5mg BID), hx of stroke (2019 2/2 afib), focal seizure (on depakote, keppra), hx of CABG (2020), hx of nonbleeding gastric/duodenal ulcer (EGD 2/9/22, on protonix), s/p DCD KTX, came back due to weakness    DDRT 4/21/2022    course c/b delayed graft function requiring HD    - HD per transplant team --currently being held  - Continue to monitor urine outpt--   - Immunosuppressant per transplant team     HTN:  elevated   -Titrate coreg  -Monitor BP closely     Anemia:  CT A/P revealed a large hematoma. s/p hematoma evacuation and repair of arterial anastomosis on 5/2.  monitor Hgb

## 2022-05-06 NOTE — PROGRESS NOTE ADULT - SUBJECTIVE AND OBJECTIVE BOX
[facility] DIVISION OF KIDNEY DISEASES AND HYPERTENSION -- FOLLOW UP NOTE  --------------------------------------------------------------------------------  Chief Complaint:    24 hour events/subjective:  Patient was seen and examined at bedside      PAST HISTORY  --------------------------------------------------------------------------------  No significant changes to PMH, PSH, FHx, SHx, unless otherwise noted    ALLERGIES & MEDICATIONS  --------------------------------------------------------------------------------  Allergies    No Known Allergies    Intolerances      Standing Inpatient Medications  atorvastatin 40 milliGRAM(s) Oral at bedtime  carvedilol 3.125 milliGRAM(s) Oral two times a day  chlorhexidine 2% Cloths 1 Application(s) Topical daily  dextrose 5%. 1000 milliLiter(s) IV Continuous <Continuous>  dextrose 5%. 1000 milliLiter(s) IV Continuous <Continuous>  dextrose 50% Injectable 25 Gram(s) IV Push once  dextrose 50% Injectable 12.5 Gram(s) IV Push once  dextrose 50% Injectable 25 Gram(s) IV Push once  diVALproex  milliGRAM(s) Oral <User Schedule>  diVALproex  milliGRAM(s) Oral <User Schedule>  epoetin cortney-epbx (RETACRIT) Injectable 12617 Unit(s) SubCutaneous every 7 days  furosemide   Injectable 80 milliGRAM(s) IV Push two times a day  glucagon  Injectable 1 milliGRAM(s) IntraMuscular once  insulin glargine Injectable (LANTUS) 6 Unit(s) SubCutaneous at bedtime  insulin lispro (ADMELOG) corrective regimen sliding scale   SubCutaneous three times a day before meals  insulin lispro (ADMELOG) corrective regimen sliding scale   SubCutaneous at bedtime  insulin lispro Injectable (ADMELOG) 4 Unit(s) SubCutaneous three times a day before meals  levETIRAcetam 250 milliGRAM(s) Oral every 12 hours  levothyroxine 50 MICROGram(s) Oral daily  mycophenolate mofetil 1000 milliGRAM(s) Oral <User Schedule>  nystatin    Suspension 357640 Unit(s) Oral four times a day  pantoprazole    Tablet 40 milliGRAM(s) Oral before breakfast  polyethylene glycol 3350 17 Gram(s) Oral daily  predniSONE   Tablet 5 milliGRAM(s) Oral daily  senna 2 Tablet(s) Oral at bedtime  sevelamer carbonate 800 milliGRAM(s) Oral three times a day with meals  sodium bicarbonate 1300 milliGRAM(s) Oral every 12 hours  trimethoprim   80 mG/sulfamethoxazole 400 mG 1 Tablet(s) Oral daily  valGANciclovir 450 milliGRAM(s) Oral <User Schedule>    PRN Inpatient Medications  acetaminophen     Tablet .. 650 milliGRAM(s) Oral every 6 hours PRN  dextrose Oral Gel 15 Gram(s) Oral once PRN      REVIEW OF SYSTEMS  --------------------------------------------------------------------------------  Gen: No fatigue, fevers/chills, weakness  Skin: No rashes  Head/Eyes/Ears/Mouth: No headache;No sore throat  Respiratory: No dyspnea, cough,   CV: No chest pain, PND, orthopnea  GI: No abdominal pain, diarrhea, constipation, nausea, vomiting  Transplant: No pain  : No increased frequency, dysuria, hematuria, nocturia  MSK: No joint pain/swelling; no back pain; no edema  Neuro: No dizziness/lightheadedness, weakness, seizures, numbness, tingling  Psych: No significant nervousness, anxiety, stress, depression    All other systems were reviewed and are negative, except as noted.    VITALS/PHYSICAL EXAM  --------------------------------------------------------------------------------  T(C): 36.7 (05-06-22 @ 05:00), Max: 36.7 (05-05-22 @ 12:51)  HR: 74 (05-06-22 @ 05:00) (67 - 74)  BP: 170/75 (05-06-22 @ 05:00) (150/65 - 170/75)  RR: 18 (05-06-22 @ 05:00) (18 - 20)  SpO2: 97% (05-06-22 @ 05:00) (97% - 100%)  Wt(kg): --        05-05-22 @ 07:01  -  05-06-22 @ 07:00  --------------------------------------------------------  IN: 960 mL / OUT: 3520 mL / NET: -2560 mL      Physical Exam:  	Gen: NAD  	HEENT: PERRL, supple neck, clear oropharynx  	Pulm: CTA B/L  	CV: RRR, S1S2; no rub  	Back: No spinal or CVA tenderness; no sacral edema  	Abd: +BS, soft, nontender/nondistended                      Transplant: No tenderness, swelling  	: No suprapubic tenderness  	UE: Warm, FROM; no edema; no asterixis  	LE: Warm, FROM; no edema  	Neuro: No focal deficits  	Psych: Normal affect and mood  	Skin: Warm, without rashes      LABS/STUDIES  --------------------------------------------------------------------------------              8.3    10.08 >-----------<  211      [05-06-22 @ 06:36]              24.8     132  |  90  |  76  ----------------------------<  186      [05-06-22 @ 06:35]  4.2   |  27  |  3.72        Ca     8.7     [05-06-22 @ 06:35]      Mg     2.0     [05-06-22 @ 06:35]      Phos  3.4     [05-06-22 @ 06:35]    TPro  5.7  /  Alb  2.3  /  TBili  0.3  /  DBili  x   /  AST  18  /  ALT  6   /  AlkPhos  71  [05-06-22 @ 06:35]        Creatinine Trend:  SCr 3.72 [05-06 @ 06:35]  SCr 3.99 [05-05 @ 06:39]  SCr 4.14 [05-04 @ 04:27]  SCr 3.88 [05-03 @ 06:47]  SCr 3.89 [05-02 @ 22:05]    Tacrolimus (), Serum: 16.4 ng/mL (05-05 @ 06:39)  Tacrolimus (), Serum: 8.9 ng/mL (05-04 @ 04:35)  Tacrolimus (), Serum: 8.7 ng/mL (05-03 @ 06:48)  Tacrolimus (), Serum: 5.3 ng/mL (05-02 @ 07:58)                Iron 17, TIBC 171, %sat 10      [05-02-22 @ 13:03]  Ferritin 1505      [05-02-22 @ 13:05]  PTH -- (Ca 9.2)      [02-05-22 @ 10:13]   294  HbA1c 6.0      [02-21-20 @ 08:53]  Lipid: chol 118, TG 54, HDL 59, LDL --      [06-27-21 @ 09:44]    HBsAb 86301.2      [04-21-22 @ 14:13]  HBsAg Nonreact      [04-21-22 @ 14:13]  HBcAb Reactive      [04-21-22 @ 14:13]  HCV 0.25, Nonreact      [04-21-22 @ 14:13]  HIV Nonreact      [04-21-22 @ 14:47]

## 2022-05-06 NOTE — PROGRESS NOTE ADULT - ASSESSMENT
66M with history of HTN, HLD, DM (on insulin), ESRD (2/2 DM and HTN) on HD via L permacath since 2019 MWF, AFib, CVA (2019 2/2 afib), seizures, CABG (2020), gastric/duodenal ulcers, s/p DCD DDRT (1A/1V/1U stented) on 4/21/2022 with Thymoglobulin Induction c/b DGF requiring HD.  Re-admitted for anemia 2/2 perinephric hematoma s/p OR evacuation on 5/2    s/p R DCD DDRT c/b perinephric hematoma s/p OR evacuation (POD#4)  -h/h stable, no concerns for active bleeding at this time  -graft: DGF, has been off HD since 4/29,   Will continue to hold HD per Nephrology  - Dec lasix to 40 bid  -graft bx intra-op negative  -off  Zosyn negative OR cultures. appreciate ID following  -Restart fluc for ppx- keep for 1 month  -abdominal binder while OOB  -PT/SCDs/Spirometry  -ADAT    Immunosuppression  -Envarsus per level, MMF 1/1, Pred 5  -PPx: Nystatin on hold while on fluc, Bactrim, Valcyte (renal dosing), PPI    DM  -on Lispro/pre-meal/LUCIA, adjust prn    AFib/HTN  -Eliquis/Plavix on HOLD, will restart as able  -Increase coreg to 6.25 bid   Seizures  -continue home meds    DISPO  -if continues to remain stable, will plan for d/c to [facility]) in the next 1-2 days  -will be discharged home with ALINA drain  -to f/u with Dr. Barber in 1 week  -will need ureteral stent removed in 4-6 weeks

## 2022-05-07 NOTE — PROGRESS NOTE ADULT - SUBJECTIVE AND OBJECTIVE BOX
Transplant Surgery - Multidisciplinary Rounds  --------------------------------------------------------------  DDRT Date: 4/21/22    Pt seen and examined with multidisciplinary team:  Surgeon: Dr. Tena, Nephrologist: Dr. Nova, WALDO Wayne, unit RN. Rest of disciplines not in attendance to be notified of plan    66M with history of HTN, HLD, DM (on insulin), ESRD (2/2 DM and HTN) on HD via L PermCath MWF, AFib (on Eliquis 2.5mg BID), CVA (2019 2/2 afib), focal seizure (on depakote, keppra), CAD with stents, CABG (2020- was on Plavix), hx of nonbleeding gastric/duodenal ulcer (EGD 2/9/22, on protonix)    s/p R DCD DDRT (1A/1V/1U stented) on 4/21/2022 with Thymoglobulin Induction   - Donor: 56 y/o M, KDPI 82%, PHS high risk, terminal Cr 1.34, CMV pos, EBV pos, HCV OPAL neg)   - Graft: post op renal doppler with  good perfusion; course c/b delayed graft function requiring HD inpatient and on discharge.  - AFib: Eliquis was resumed post-op, now on hold; Plavix has been on hold post-op    -Readmitted on 4/28 with weakness, anemia  -s/p OR evacuation of hematoma and repair of bleeding by arterial anastomosis, graft bx on 5/2    Interval Events:  -POD#5  -h/h stable  -ALINA with decreasing output  -generalized weakness improving, though OOB with heavy assist  -edema with significant improvement while on Lasix  -graft: improving DGF, last HD on 4/29. UO (~900), weight and Cr improving daily  -rest of VSS; Afebrile    Immunosuppression:  -Envarsus per level, MMF 1/1, Pred 5  -ongoing monitoring for signs of rejection    Potential Discharge date: early next week    Education:  Medications    Plan of care:  See Below      MEDICATIONS  (STANDING):  apixaban 2.5 milliGRAM(s) Oral two times a day  atorvastatin 40 milliGRAM(s) Oral at bedtime  carvedilol 6.25 milliGRAM(s) Oral every 12 hours  chlorhexidine 2% Cloths 1 Application(s) Topical daily  dextrose 5%. 1000 milliLiter(s) (50 mL/Hr) IV Continuous <Continuous>  dextrose 5%. 1000 milliLiter(s) (100 mL/Hr) IV Continuous <Continuous>  dextrose 50% Injectable 25 Gram(s) IV Push once  dextrose 50% Injectable 12.5 Gram(s) IV Push once  dextrose 50% Injectable 25 Gram(s) IV Push once  diVALproex  milliGRAM(s) Oral <User Schedule>  diVALproex  milliGRAM(s) Oral <User Schedule>  epoetin cortney-epbx (RETACRIT) Injectable 05754 Unit(s) SubCutaneous every 7 days  fluconAZOLE   Tablet 200 milliGRAM(s) Oral daily  glucagon  Injectable 1 milliGRAM(s) IntraMuscular once  insulin glargine Injectable (LANTUS) 6 Unit(s) SubCutaneous at bedtime  insulin lispro (ADMELOG) corrective regimen sliding scale   SubCutaneous three times a day before meals  insulin lispro (ADMELOG) corrective regimen sliding scale   SubCutaneous at bedtime  insulin lispro Injectable (ADMELOG) 4 Unit(s) SubCutaneous three times a day before meals  levETIRAcetam 250 milliGRAM(s) Oral every 12 hours  levothyroxine 50 MICROGram(s) Oral daily  magnesium oxide 400 milliGRAM(s) Oral two times a day with meals  mycophenolate mofetil 1000 milliGRAM(s) Oral <User Schedule>  pantoprazole    Tablet 40 milliGRAM(s) Oral before breakfast  polyethylene glycol 3350 17 Gram(s) Oral daily  predniSONE   Tablet 5 milliGRAM(s) Oral daily  senna 2 Tablet(s) Oral at bedtime  trimethoprim   80 mG/sulfamethoxazole 400 mG 1 Tablet(s) Oral daily  valGANciclovir 450 milliGRAM(s) Oral <User Schedule>    MEDICATIONS  (PRN):  acetaminophen     Tablet .. 650 milliGRAM(s) Oral every 6 hours PRN Temp greater or equal to 38C (100.4F), Mild Pain (1 - 3)  dextrose Oral Gel 15 Gram(s) Oral once PRN Blood Glucose LESS THAN 70 milliGRAM(s)/deciliter        Vital Signs Last 24 Hrs  T(C): 36.8 (07 May 2022 08:32), Max: 36.9 (07 May 2022 05:15)  T(F): 98.3 (07 May 2022 08:32), Max: 98.5 (07 May 2022 05:15)  HR: 65 (07 May 2022 08:32) (60 - 74)  BP: 150/72 (07 May 2022 08:32) (150/72 - 169/77)  BP(mean): 100 (07 May 2022 01:00) (100 - 100)  RR: 20 (07 May 2022 08:32) (16 - 20)  SpO2: 100% (07 May 2022 08:32) (96% - 100%)    I&O's Summary    06 May 2022 07:01  -  07 May 2022 07:00  --------------------------------------------------------  IN: 1200 mL / OUT: 1350 mL / NET: -150 mL    07 May 2022 07:01  -  07 May 2022 11:22  --------------------------------------------------------  IN: 480 mL / OUT: 650 mL / NET: -170 mL                              8.9    7.84  )-----------( 235      ( 07 May 2022 06:45 )             27.0     05-07    132<L>  |  90<L>  |  79<H>  ----------------------------<  98  4.2   |  29  |  3.51<H>    Ca    8.9      07 May 2022 06:45  Phos  3.3     05-07  Mg     1.8     05-07    TPro  6.0  /  Alb  2.6<L>  /  TBili  0.4  /  DBili  x   /  AST  17  /  ALT  5<L>  /  AlkPhos  69  05-07    Tacrolimus (), Serum: 7.8 ng/mL (05-06 @ 06:37)        Culture - Body Fluid with Gram Stain (collected 05-03-22 @ 02:12)  Source: .Body Fluid BLOOD CLOT  Gram Stain (05-03-22 @ 06:37):    No polymorphonuclear cells seen per low power field    No organisms seen per oil power field  Preliminary Report (05-04-22 @ 11:00):    No growth to date.                  Review of Systems: Gen: No weight changes, fatigue, fevers/chills, +weakness  Skin: No rashes  Head/Eyes/Ears/Mouth: No headache; Normal hearing; Normal vision w/o blurriness; No sinus pain/discomfort, sore throat  Respiratory: No dyspnea, cough, wheezing, hemoptysis  CV: No chest pain, PND, orthopnea  GI: mild abdominal pain, no diarrhea, constipation, nausea, vomiting, melena, hematochezia  : No increased frequency, dysuria, hematuria, nocturia  MSK: No joint pain/swelling; no back pain; no edema  Neuro: No dizziness/lightheadedness, weakness, seizures, numbness, tingling  Heme: No easy bruising or bleeding  Endo: No heat/cold intolerance  Psych: No significant nervousness, anxiety, stress, depression  All other systems were reviewed and are negative, except as noted    Physical Exam:   Constitutional: Well developed / well nourished  Eyes: Anicteric, PERRLA  ENMT: nc/at  Neck: Supple  Respiratory: CTA B/L  Cardiovascular: RRR  Gastrointestinal: Soft abdomen, appropriate incisional TTP.  incision c/d/i.  ALINA x1 ss  Genitourinary:  good UO  Extremities: SCD's in place and working bilaterally. now with trace edema, significant improvement  Vascular: Palpable dp pulses bilaterally  Neurological: A&O x3  Musculoskeletal: Moving all extremities, generalized weakness  Psychiatric: Responsive   Transplant Surgery - Multidisciplinary Rounds  --------------------------------------------------------------  DDRT Date: 4/21/22    Pt seen and examined with multidisciplinary team:  Surgeon: Dr. Tena, Nephrologist: Dr. Nova, WALDO Wayne, unit RN. Rest of disciplines not in attendance to be notified of plan    66M with history of HTN, HLD, DM (on insulin), ESRD (2/2 DM and HTN) on HD via L PermCath MWF, AFib (on Eliquis 2.5mg BID), CVA (2019 2/2 afib), focal seizure (on depakote, keppra), CAD with stents, CABG (2020- was on Plavix), hx of nonbleeding gastric/duodenal ulcer (EGD 2/9/22, on protonix)    s/p R DCD DDRT (1A/1V/1U stented) on 4/21/2022 with Thymoglobulin Induction   - Donor: 56 y/o M, KDPI 82%, PHS high risk, terminal Cr 1.34, CMV pos, EBV pos, HCV OPAL neg)   - Graft: post op renal doppler with  good perfusion; course c/b delayed graft function requiring HD inpatient and on discharge.  - AFib: Eliquis was resumed post-op, now on hold; Plavix has been on hold post-op    -Readmitted on 4/28 with weakness, anemia  -s/p OR evacuation of hematoma and repair of bleeding by arterial anastomosis, graft bx on 5/2    Interval Events:  -POD#5  -h/h stable  -ALINA with decreasing output  -generalized weakness improving, though OOB with heavy assist  -edema with significant improvement while on Lasix  -graft: improving DGF, last HD on 4/29. UO (~900), weight and Cr improving daily  -rest of VSS; Afebrile    Immunosuppression:  -Envarsus per level, MMF 1/1, Pred 5  -ongoing monitoring for signs of rejection    Potential Discharge date: early next week    Education:  Medications    Plan of care:  See Below      MEDICATIONS  (STANDING):  apixaban 2.5 milliGRAM(s) Oral two times a day  atorvastatin 40 milliGRAM(s) Oral at bedtime  carvedilol 6.25 milliGRAM(s) Oral every 12 hours  chlorhexidine 2% Cloths 1 Application(s) Topical daily  dextrose 5%. 1000 milliLiter(s) (50 mL/Hr) IV Continuous <Continuous>  dextrose 5%. 1000 milliLiter(s) (100 mL/Hr) IV Continuous <Continuous>  dextrose 50% Injectable 25 Gram(s) IV Push once  dextrose 50% Injectable 12.5 Gram(s) IV Push once  dextrose 50% Injectable 25 Gram(s) IV Push once  diVALproex  milliGRAM(s) Oral <User Schedule>  diVALproex  milliGRAM(s) Oral <User Schedule>  epoetin cortney-epbx (RETACRIT) Injectable 16613 Unit(s) SubCutaneous every 7 days  fluconAZOLE   Tablet 200 milliGRAM(s) Oral daily  glucagon  Injectable 1 milliGRAM(s) IntraMuscular once  insulin glargine Injectable (LANTUS) 6 Unit(s) SubCutaneous at bedtime  insulin lispro (ADMELOG) corrective regimen sliding scale   SubCutaneous three times a day before meals  insulin lispro (ADMELOG) corrective regimen sliding scale   SubCutaneous at bedtime  insulin lispro Injectable (ADMELOG) 4 Unit(s) SubCutaneous three times a day before meals  levETIRAcetam 250 milliGRAM(s) Oral every 12 hours  levothyroxine 50 MICROGram(s) Oral daily  magnesium oxide 400 milliGRAM(s) Oral two times a day with meals  mycophenolate mofetil 1000 milliGRAM(s) Oral <User Schedule>  pantoprazole    Tablet 40 milliGRAM(s) Oral before breakfast  polyethylene glycol 3350 17 Gram(s) Oral daily  predniSONE   Tablet 5 milliGRAM(s) Oral daily  senna 2 Tablet(s) Oral at bedtime  trimethoprim   80 mG/sulfamethoxazole 400 mG 1 Tablet(s) Oral daily  valGANciclovir 450 milliGRAM(s) Oral <User Schedule>    MEDICATIONS  (PRN):  acetaminophen     Tablet .. 650 milliGRAM(s) Oral every 6 hours PRN Temp greater or equal to 38C (100.4F), Mild Pain (1 - 3)  dextrose Oral Gel 15 Gram(s) Oral once PRN Blood Glucose LESS THAN 70 milliGRAM(s)/deciliter        Vital Signs Last 24 Hrs  T(C): 36.8 (07 May 2022 08:32), Max: 36.9 (07 May 2022 05:15)  T(F): 98.3 (07 May 2022 08:32), Max: 98.5 (07 May 2022 05:15)  HR: 65 (07 May 2022 08:32) (60 - 74)  BP: 150/72 (07 May 2022 08:32) (150/72 - 169/77)  BP(mean): 100 (07 May 2022 01:00) (100 - 100)  RR: 20 (07 May 2022 08:32) (16 - 20)  SpO2: 100% (07 May 2022 08:32) (96% - 100%)    I&O's Summary    06 May 2022 07:01  -  07 May 2022 07:00  --------------------------------------------------------  IN: 1200 mL / OUT: 1350 mL / NET: -150 mL    07 May 2022 07:01  -  07 May 2022 11:22  --------------------------------------------------------  IN: 480 mL / OUT: 650 mL / NET: -170 mL                              8.9    7.84  )-----------( 235      ( 07 May 2022 06:45 )             27.0     05-07    132<L>  |  90<L>  |  79<H>  ----------------------------<  98  4.2   |  29  |  3.51<H>    Ca    8.9      07 May 2022 06:45  Phos  3.3     05-07  Mg     1.8     05-07    TPro  6.0  /  Alb  2.6<L>  /  TBili  0.4  /  DBili  x   /  AST  17  /  ALT  5<L>  /  AlkPhos  69  05-07    Tacrolimus (), Serum: 7.8 ng/mL (05-06 @ 06:37)        Culture - Body Fluid with Gram Stain (collected 05-03-22 @ 02:12)  Source: .Body Fluid BLOOD CLOT  Gram Stain (05-03-22 @ 06:37):    No polymorphonuclear cells seen per low power field    No organisms seen per oil power field  Preliminary Report (05-04-22 @ 11:00):    No growth to date.                  Review of Systems: Gen: No weight changes, fatigue, fevers/chills, +weakness  Skin: No rashes  Head/Eyes/Ears/Mouth: No headache; Normal hearing; Normal vision w/o blurriness; No sinus pain/discomfort, sore throat  Respiratory: No dyspnea, cough, wheezing, hemoptysis  CV: No chest pain, PND, orthopnea  GI: mild abdominal pain, no diarrhea, constipation, nausea, vomiting, melena, hematochezia  : No increased frequency, dysuria, hematuria, nocturia  MSK: No joint pain/swelling; no back pain; no edema  Neuro: No dizziness/lightheadedness, weakness, seizures, numbness, tingling  Heme: No easy bruising or bleeding  Endo: No heat/cold intolerance  Psych: No significant nervousness, anxiety, stress, depression  All other systems were reviewed and are negative, except as noted    Physical Exam:   Constitutional: Well developed / well nourished  Eyes: Anicteric, PERRLA  ENMT: nc/at  Neck: Supple  Respiratory: CTA B/L  Cardiovascular: RRR  Gastrointestinal: Soft abdomen, appropriate incisional TTP.  incision c/d/i.  ALINA x1 ss  Genitourinary:  good UO  Extremities: SCD's in place and working bilaterally. now with trace edema, significant improvement. L PermCath c/d/i  Vascular: Palpable dp pulses bilaterally  Neurological: A&O x3  Musculoskeletal: Moving all extremities, generalized weakness  Psychiatric: Responsive

## 2022-05-07 NOTE — PROGRESS NOTE ADULT - ASSESSMENT
66 yr old man with ESRD due to DM on HD since 2019  s/p DDRT on 4/21/22 complicated by DGF .   Donor: 57 year old male, KDPI 82%, PHS high risk, terminal Cr 1.34, CMV pos, EBV pos, HCV OPAL neg   PMH : Diabetes type II on insulin, HTN, CAD s/p CABG in 2020, CVA in 2019, Afib on Eliquis, Hypothyroidism,  Seizure on Keppra, Depakote added recently for breakthrough seizures.   Had GI bleed in february 2022, EGD showed non bleeding gastric and duodenal ulcers, treated with PPI.   He is admitted with increased lethargy and symptomatic severe anemia with hematoma around allograft. S/p hematoma evacuation and repair of arterial anastomosis on 5/2/22.     1. S/p DDRT on 4/21/22 -  complicated by DGF, last HD done on 4/29/22. Graft function improving slowly. Creatinine 3.5mg/dL today, good UOP.      Volume overload has resolved. D/c Lasix. Electrolytes fair. No need for dialysis.     2. Immunosuppression - S/p Thymo induction. Continue Envarsus, target trough 8-10, continue cellcept 1gm po bid, prednisone 5mg daily      Infection prophylaxis - Continue valcyte, bactrim, fluconazole     3. Peritransplant hematoma s/p evacuation and repair of anastomotic leak. On empiric fluconazole. H/H stable.     4. A.fib with h/o CVA - Eliquis has been on hold for hematoma. Resume today as h/h stable. Monitor ALINA drain output and H/H     5. HTN - Continue Coreg 6.25mg po bid. If BP remains high can increase dose further to 12.5mg po bid     6. DM - Continue Lantus and Admelog     7. Seizure d/o  -  continue Depakote and Keppra

## 2022-05-07 NOTE — PROGRESS NOTE ADULT - SUBJECTIVE AND OBJECTIVE BOX
[facility] DIVISION OF KIDNEY DISEASES AND HYPERTENSION -- FOLLOW UP NOTE  --------------------------------------------------------------------------------  Authored by: Ozzie Nova   Cell # [phone]     CHAD DUNBAR was seen and examined at bedside.     Chief complaint: 66 yr old man s/p kidney transplant on 4/21/22 admitted with severe anemia due to peritransplant hematoma     24 hour events/subjective:- no major events. Pain is well controlled. No fever. No NVD. Voiding urine without difficulty.       Standing Inpatient Medications  atorvastatin 40 milliGRAM(s) Oral at bedtime  carvedilol 6.25 milliGRAM(s) Oral every 12 hours  diVALproex  milliGRAM(s) Oral <User Schedule>  diVALproex  milliGRAM(s) Oral <User Schedule>  epoetin cortney-epbx (RETACRIT) Injectable 15917 Unit(s) SubCutaneous every 7 days  fluconAZOLE   Tablet 200 milliGRAM(s) Oral daily  glucagon  Injectable 1 milliGRAM(s) IntraMuscular once  insulin glargine Injectable (LANTUS) 6 Unit(s) SubCutaneous at bedtime  insulin lispro (ADMELOG) corrective regimen sliding scale   SubCutaneous three times a day before meals  insulin lispro (ADMELOG) corrective regimen sliding scale   SubCutaneous at bedtime  insulin lispro Injectable (ADMELOG) 4 Unit(s) SubCutaneous three times a day before meals  levETIRAcetam 250 milliGRAM(s) Oral every 12 hours  levothyroxine 50 MICROGram(s) Oral daily  magnesium oxide 400 milliGRAM(s) Oral two times a day with meals  mycophenolate mofetil 1000 milliGRAM(s) Oral <User Schedule>  pantoprazole    Tablet 40 milliGRAM(s) Oral before breakfast  polyethylene glycol 3350 17 Gram(s) Oral daily  predniSONE   Tablet 5 milliGRAM(s) Oral daily  senna 2 Tablet(s) Oral at bedtime  trimethoprim   80 mG/sulfamethoxazole 400 mG 1 Tablet(s) Oral daily  valGANciclovir 450 milliGRAM(s) Oral <User Schedule>    PRN Inpatient Medications  acetaminophen     Tablet .. 650 milliGRAM(s) Oral every 6 hours PRN  dextrose Oral Gel 15 Gram(s) Oral once PRN        VITALS/PHYSICAL EXAM  --------------------------------------------------------------------------------  T(C): 36.8 (05-07-22 @ 08:32), Max: 36.9 (05-07-22 @ 05:15)  HR: 65 (05-07-22 @ 08:32) (60 - 74)  BP: 150/72 (05-07-22 @ 08:32) (150/72 - 169/77)  RR: 20 (05-07-22 @ 08:32) (16 - 20)  SpO2: 100% (05-07-22 @ 08:32) (96% - 100%)      05-06-22 @ 07:01  -  05-07-22 @ 07:00  --------------------------------------------------------  IN: 1200 mL / OUT: 1350 mL / NET: -150 mL    05-07-22 @ 07:01  -  05-07-22 @ 11:16  --------------------------------------------------------  IN: 480 mL / OUT: 650 mL / NET: -170 mL      Physical Exam:  Alert, comfortably sitting in chair, frail and thin  No thrush   Left IJ tunneled dialysis catheter   Lungs clear b/l  Abdomen soft, RLQ wound clean, staples +, ALINA serosanguinous drainage   No LE edema       LABS/STUDIES  --------------------------------------------------------------------------------              8.9    7.84  >-----------<  235      [05-07-22 @ 06:45]              27.0     132  |  90  |  79  ----------------------------<  98      [05-07-22 @ 06:45]  4.2   |  29  |  3.51        Ca     8.9     [05-07-22 @ 06:45]      Mg     1.8     [05-07-22 @ 06:45]      Phos  3.3     [05-07-22 @ 06:45]    TPro  6.0  /  Alb  2.6  /  TBili  0.4  /  DBili  x   /  AST  17  /  ALT  5   /  AlkPhos  69  [05-07-22 @ 06:45]      Creatinine Trend:  SCr 3.51 [05-07 @ 06:45]  SCr 3.72 [05-06 @ 06:35]  SCr 3.99 [05-05 @ 06:39]  SCr 4.14 [05-04 @ 04:27]  SCr 3.88 [05-03 @ 06:47]    Tacrolimus (), Serum: 7.8 ng/mL (05-06 @ 06:37)  Tacrolimus (), Serum: 16.4 ng/mL (05-05 @ 06:39)  Tacrolimus (), Serum: 8.9 ng/mL (05-04 @ 04:35)  Tacrolimus (), Serum: 8.7 ng/mL (05-03 @ 06:48)      CAPILLARY BLOOD GLUCOSE  POCT Blood Glucose.: 128 mg/dL (07 May 2022 08:46)  POCT Blood Glucose.: 173 mg/dL (06 May 2022 21:53)  POCT Blood Glucose.: 165 mg/dL (06 May 2022 17:28)  POCT Blood Glucose.: 245 mg/dL (06 May 2022 13:02)      Iron 17, TIBC 171, %sat 10      [05-02-22 @ 13:03]  Ferritin 1505      [05-02-22 @ 13:05]

## 2022-05-07 NOTE — PROGRESS NOTE ADULT - SUBJECTIVE AND OBJECTIVE BOX
[facility]   MD NINA MASON PA MIJUNG SHIN NP     TEL:  OFFICE: [phone]  DR HSU CELL: [phone]  DR. MARQUEZ CELL: [phone]  MARILYNN MORELOS CELL: [phone]  NP Lillian Buchanan CELL: [phone]    From 5pm-7am Answering Service [phone]    -- RENAL FOLLOW UP NOTE ---Date of Service 05-07-22 @ 14:16    Patient is a 66y old  Male who presents with a chief complaint of perinephric hematoma (07 May 2022 11:18)      Patient seen and examined at bedside. No chest pain/sob    VITALS:  T(F): 97.9 (05-07-22 @ 12:12), Max: 98.5 (05-07-22 @ 05:15)  HR: 65 (05-07-22 @ 12:12)  BP: 151/76 (05-07-22 @ 12:12)  RR: 20 (05-07-22 @ 12:12)  SpO2: 100% (05-07-22 @ 12:12)  Wt(kg): --    05-06 @ 07:01  -  05-07 @ 07:00  --------------------------------------------------------  IN: 1200 mL / OUT: 1350 mL / NET: -150 mL    05-07 @ 07:01  -  05-07 @ 14:16  --------------------------------------------------------  IN: 720 mL / OUT: 1110 mL / NET: -390 mL          PHYSICAL EXAM:  Constitutional: NAD  Neck: No JVD  Respiratory: CTAB, no wheezes, rales or rhonchi  Cardiovascular: S1, S2, RRR  Gastrointestinal: BS+, soft, NT/ND  Urology: no tucker cath  Extremities: No leg edema  Skin: warm and dry  Vascular access: HD cath on  chest    Hospital Medications:   MEDICATIONS  (STANDING):  apixaban 2.5 milliGRAM(s) Oral two times a day  atorvastatin 40 milliGRAM(s) Oral at bedtime  carvedilol 6.25 milliGRAM(s) Oral every 12 hours  chlorhexidine 2% Cloths 1 Application(s) Topical daily  dextrose 5%. 1000 milliLiter(s) (50 mL/Hr) IV Continuous <Continuous>  dextrose 5%. 1000 milliLiter(s) (100 mL/Hr) IV Continuous <Continuous>  dextrose 50% Injectable 25 Gram(s) IV Push once  dextrose 50% Injectable 12.5 Gram(s) IV Push once  dextrose 50% Injectable 25 Gram(s) IV Push once  diVALproex  milliGRAM(s) Oral <User Schedule>  diVALproex  milliGRAM(s) Oral <User Schedule>  epoetin cortney-epbx (RETACRIT) Injectable 10698 Unit(s) SubCutaneous every 7 days  fluconAZOLE   Tablet 200 milliGRAM(s) Oral daily  glucagon  Injectable 1 milliGRAM(s) IntraMuscular once  insulin glargine Injectable (LANTUS) 6 Unit(s) SubCutaneous at bedtime  insulin lispro (ADMELOG) corrective regimen sliding scale   SubCutaneous three times a day before meals  insulin lispro (ADMELOG) corrective regimen sliding scale   SubCutaneous at bedtime  insulin lispro Injectable (ADMELOG) 4 Unit(s) SubCutaneous three times a day before meals  levETIRAcetam 250 milliGRAM(s) Oral every 12 hours  levothyroxine 50 MICROGram(s) Oral daily  magnesium oxide 400 milliGRAM(s) Oral two times a day with meals  mycophenolate mofetil 1000 milliGRAM(s) Oral <User Schedule>  pantoprazole    Tablet 40 milliGRAM(s) Oral before breakfast  polyethylene glycol 3350 17 Gram(s) Oral daily  predniSONE   Tablet 5 milliGRAM(s) Oral daily  senna 2 Tablet(s) Oral at bedtime  trimethoprim   80 mG/sulfamethoxazole 400 mG 1 Tablet(s) Oral daily  valGANciclovir 450 milliGRAM(s) Oral <User Schedule>    Tacrolimus (), Serum: 8.6 ng/mL (05-07 @ 06:48)    LABS:  05-07    132<L>  |  90<L>  |  79<H>  ----------------------------<  98  4.2   |  29  |  3.51<H>    Ca    8.9      07 May 2022 06:45  Phos  3.3     05-07  Mg     1.8     05-07    TPro  6.0  /  Alb  2.6<L>  /  TBili  0.4  /  DBili      /  AST  17  /  ALT  5<L>  /  AlkPhos  69  05-07    Creatinine Trend: 3.51 <--, 3.72 <--, 3.99 <--, 4.14 <--, 3.88 <--, 3.89 <--, 3.99 <--, 3.95 <--, 3.23 <--    Albumin, Serum: 2.6 g/dL (05-07 @ 06:45)  Phosphorus Level, Serum: 3.3 mg/dL (05-07 @ 06:45)                              8.9    7.84  )-----------( 235      ( 07 May 2022 06:45 )             27.0     Urine Studies:      Iron 17, TIBC 171, %sat 10      [05-02-22 @ 13:03]  Ferritin 1505      [05-02-22 @ 13:05]  PTH -- (Ca 9.2)      [02-05-22 @ 10:13]   294  HbA1c 6.0      [02-21-20 @ 08:53]  Lipid: chol 118, TG 54, HDL 59, LDL --      [06-27-21 @ 09:44]    HBsAb 94461.2      [04-21-22 @ 14:13]  HBsAg Nonreact      [04-21-22 @ 14:13]  HBcAb Reactive      [04-21-22 @ 14:13]  HCV 0.25, Nonreact      [04-21-22 @ 14:13]  HIV Nonreact      [04-21-22 @ 14:47]      RADIOLOGY & ADDITIONAL STUDIES:

## 2022-05-07 NOTE — PROGRESS NOTE ADULT - ASSESSMENT
66M with history of HTN, HLD, DM (on insulin), ESRD (2/2 DM and HTN) on HD via L permacath since 2019 MWF, AFib, CVA (2019 2/2 afib), seizures, CABG (2020), gastric/duodenal ulcers, s/p DCD DDRT (1A/1V/1U stented) on 4/21/2022 with Thymoglobulin Induction c/b DGF requiring HD.  Re-admitted for anemia 2/2 perinephric hematoma s/p OR evacuation on 5/2    s/p R DCD DDRT c/b perinephric hematoma s/p OR evacuation (POD#5)  -h/h stable, no concerns for active bleeding at this time. Will restart Eliquis 2.5/2.5 and watch closely this weekend  -graft: DGF, has been off HD since 4/29, Cr downtrending, but with improving UO and edema.  Decrease to Lasix 40QD today.  Will continue to hold HD per Nephrology  -graft bx intra-op negative  -abdominal binder while OOB  -PT/SCDs/Spirometry  -ADAT    Immunosuppression  -Envarsus per level, MMF 1/1, Pred 5  -PPx: Fluc for 1 month, Bactrim, Valcyte (renal dosing), PPI    DM  -on Lispro/pre-meal/LUCIA, adjust prn    AFib/HTN  -will stop Plavix  -will start Eliquis as above  -adjust Coreg prn    Seizures  -continue home meds    DISPO  -if continues to remain stable, will plan for d/c to [facility]) per family request early next week  -will be discharged home with ALINA drain  -to f/u with Dr. Barber 1 week post d/c  -will need ureteral stent removed in 4-6 weeks 66M with history of HTN, HLD, DM (on insulin), ESRD (2/2 DM and HTN) on HD via L permacath since 2019 MWF, AFib, CVA (2019 2/2 afib), seizures, CABG (2020), gastric/duodenal ulcers, s/p DCD DDRT (1A/1V/1U stented) on 4/21/2022 with Thymoglobulin Induction c/b DGF requiring HD.  Re-admitted for anemia 2/2 perinephric hematoma s/p OR evacuation on 5/2    s/p R DCD DDRT c/b perinephric hematoma s/p OR evacuation (POD#5)  -h/h stable, no concerns for active bleeding at this time. Will restart Eliquis 2.5/2.5 and watch closely this weekend  -graft: DGF initially, but has been off HD since 4/29, Cr downtrending, with improving UO and edema.  Decrease to Lasix 40QD today.  Will continue to hold HD per Nephrology  -graft bx intra-op negative  -abdominal binder while OOB  -PT/SCDs/Spirometry  -ADAT  -will determine L PermCath need as Cr decreases    Immunosuppression  -Envarsus per level, MMF 1/1, Pred 5  -PPx: Fluc for 1 month, Bactrim, Valcyte (renal dosing), PPI    DM  -on Lispro/pre-meal/LUCIA, adjust prn    AFib/HTN  -will stop Plavix  -will start Eliquis as above  -adjust Coreg prn    Seizures  -continue home meds    DISPO  -if continues to remain stable, will plan for d/c to [facility]) per family request early next week  -will be discharged home with ALINA drain  -to f/u with Dr. Barber 1 week post d/c  -will need ureteral stent removed in 4-6 weeks

## 2022-05-08 NOTE — PROGRESS NOTE ADULT - SUBJECTIVE AND OBJECTIVE BOX
[facility]   MD NINA MASON MD RUORU WONG, PA    TEL:  FROM 9 AM to 5 PM ---OFFICE: [phone]    FROM 5 PM - 9 AM PLEASE CALL ANSWERING SERVICE: [phone]    RENAL FOLLOW UP NOTE--Date of Service 05-08-22 @ 09:01  --------------------------------------------------------------------------------  HPI:      Pt seen and examined at bedside.   Marli SOB, chest pain     PAST HISTORY  --------------------------------------------------------------------------------  No significant changes to PMH, PSH, FHx, SHx, unless otherwise noted    ALLERGIES & MEDICATIONS  --------------------------------------------------------------------------------  Allergies    No Known Allergies    Intolerances      Standing Inpatient Medications  apixaban 2.5 milliGRAM(s) Oral two times a day  atorvastatin 40 milliGRAM(s) Oral at bedtime  carvedilol 12.5 milliGRAM(s) Oral every 12 hours  chlorhexidine 2% Cloths 1 Application(s) Topical daily  dextrose 5%. 1000 milliLiter(s) IV Continuous <Continuous>  dextrose 5%. 1000 milliLiter(s) IV Continuous <Continuous>  dextrose 50% Injectable 25 Gram(s) IV Push once  dextrose 50% Injectable 12.5 Gram(s) IV Push once  dextrose 50% Injectable 25 Gram(s) IV Push once  diVALproex  milliGRAM(s) Oral <User Schedule>  diVALproex  milliGRAM(s) Oral <User Schedule>  epoetin cortney-epbx (RETACRIT) Injectable 68639 Unit(s) SubCutaneous every 7 days  fluconAZOLE   Tablet 200 milliGRAM(s) Oral daily  furosemide    Tablet 40 milliGRAM(s) Oral daily  glucagon  Injectable 1 milliGRAM(s) IntraMuscular once  insulin glargine Injectable (LANTUS) 6 Unit(s) SubCutaneous at bedtime  insulin lispro (ADMELOG) corrective regimen sliding scale   SubCutaneous three times a day before meals  insulin lispro (ADMELOG) corrective regimen sliding scale   SubCutaneous at bedtime  insulin lispro Injectable (ADMELOG) 4 Unit(s) SubCutaneous three times a day before meals  levETIRAcetam 250 milliGRAM(s) Oral every 12 hours  levothyroxine 50 MICROGram(s) Oral daily  mycophenolate mofetil 1000 milliGRAM(s) Oral <User Schedule>  pantoprazole    Tablet 40 milliGRAM(s) Oral before breakfast  polyethylene glycol 3350 17 Gram(s) Oral daily  predniSONE   Tablet 5 milliGRAM(s) Oral daily  senna 2 Tablet(s) Oral at bedtime  tacrolimus ER Tablet (ENVARSUS XR) 1 milliGRAM(s) Oral <User Schedule>  trimethoprim   80 mG/sulfamethoxazole 400 mG 1 Tablet(s) Oral daily  valGANciclovir 450 milliGRAM(s) Oral <User Schedule>    PRN Inpatient Medications  acetaminophen     Tablet .. 650 milliGRAM(s) Oral every 6 hours PRN  dextrose Oral Gel 15 Gram(s) Oral once PRN      REVIEW OF SYSTEMS  --------------------------------------------------------------------------------  General: no fever  CVS: no chest pain  RESP: no sob, no cough  MSK: no edema     VITALS/PHYSICAL EXAM  --------------------------------------------------------------------------------  T(C): 36.6 (05-08-22 @ 08:43), Max: 36.9 (05-08-22 @ 01:00)  HR: 58 (05-08-22 @ 08:43) (58 - 65)  BP: 151/72 (05-08-22 @ 08:43) (130/60 - 179/77)  RR: 20 (05-08-22 @ 08:43) (18 - 20)  SpO2: 98% (05-08-22 @ 08:43) (98% - 100%)  Wt(kg): --        05-07-22 @ 07:01  -  05-08-22 @ 07:00  --------------------------------------------------------  IN: 1500 mL / OUT: 3080 mL / NET: -1580 mL      Physical Exam:  	Gen: NAD  	HEENT: MMM  	Pulm: CTA B/L  	CV: S1S2  	Abd: Soft, +BS  	Ext: No LE edema B/L                      Neuro: Awake   	Skin: Warm and Dry             FABIÁN tucker  LABS/STUDIES  --------------------------------------------------------------------------------              8.6    7.18  >-----------<  225      [05-08-22 @ 07:10]              27.0     132  |  92  |  78  ----------------------------<  133      [05-08-22 @ 07:10]  3.9   |  25  |  3.21        Ca     8.9     [05-08-22 @ 07:10]      Mg     1.7     [05-08-22 @ 07:10]      Phos  3.3     [05-08-22 @ 07:10]    TPro  5.9  /  Alb  2.6  /  TBili  0.3  /  DBili  x   /  AST  18  /  ALT  6   /  AlkPhos  69  [05-08-22 @ 07:10]          Creatinine Trend:  SCr 3.21 [05-08 @ 07:10]  SCr 3.51 [05-07 @ 06:45]  SCr 3.72 [05-06 @ 06:35]  SCr 3.99 [05-05 @ 06:39]  SCr 4.14 [05-04 @ 04:27]        Iron 17, TIBC 171, %sat 10      [05-02-22 @ 13:03]  Ferritin 1505      [05-02-22 @ 13:05]  PTH -- (Ca 9.2)      [02-05-22 @ 10:13]   294  HbA1c 6.0      [02-21-20 @ 08:53]  Lipid: chol 118, TG 54, HDL 59, LDL --      [06-27-21 @ 09:44]    HBsAb 43260.2      [04-21-22 @ 14:13]  HBsAg Nonreact      [04-21-22 @ 14:13]  HBcAb Reactive      [04-21-22 @ 14:13]  HCV 0.25, Nonreact      [04-21-22 @ 14:13]  HIV Nonreact      [04-21-22 @ 14:47]

## 2022-05-08 NOTE — PROGRESS NOTE ADULT - ASSESSMENT
66M with history of HTN, HLD, DM (on insulin), ESRD (2/2 DM and HTN) on HD via permacath since 3yrs ago MWF, afib (on eliquis 2.5mg BID), hx of stroke (2019 2/2 afib), focal seizure (on depakote, keppra), hx of CABG (2020), hx of nonbleeding gastric/duodenal ulcer (EGD 2/9/22, on protonix), s/p DCD KTX, came back due to weakness    DDRT 4/21/2022    course c/b delayed graft function requiring HD    - HD per transplant team --currently being held as renal function improving   - Continue to monitor urine outpt and renal function   - Immunosuppressant per transplant team     HTN:  Improving  -Titrate coreg  -Monitor BP closely     Anemia:  CT A/P revealed a large hematoma. s/p hematoma evacuation and repair of arterial anastomosis on 5/2.  monitor Hb

## 2022-05-08 NOTE — PROGRESS NOTE ADULT - SUBJECTIVE AND OBJECTIVE BOX
[facility] DIVISION OF KIDNEY DISEASES AND HYPERTENSION -- FOLLOW UP NOTE  --------------------------------------------------------------------------------  Authored by: Ozzie Nova   Cell # [phone]     CHAD DUNBAR was seen and examined at bedside.   Patient is a 66y old  Male who presents with a chief complaint of weakness lethargy and symptomatic severe anemia with hematoma around allograft. S/p hematoma evacuation and repair of arterial anastomosis on 5/2/22.     24 hour events/subjective:  No major events. Urinating without difficulty. BP high, coreg dose increased. Eliquis resumed yesterday. ALINA output 100ml/24 hrs.   Tolerating po, no NVD. No fever. No cough. Pain well controlled.       Standing Inpatient Medications  apixaban 2.5 milliGRAM(s) Oral two times a day  atorvastatin 40 milliGRAM(s) Oral at bedtime  carvedilol 12.5 milliGRAM(s) Oral every 12 hours  diVALproex  milliGRAM(s) Oral <User Schedule>  diVALproex  milliGRAM(s) Oral <User Schedule>  epoetin cortney-epbx (RETACRIT) Injectable 42905 Unit(s) SubCutaneous every 7 days  fluconAZOLE   Tablet 200 milliGRAM(s) Oral daily  insulin glargine Injectable (LANTUS) 6 Unit(s) SubCutaneous at bedtime  insulin lispro (ADMELOG) corrective regimen sliding scale   SubCutaneous at bedtime  insulin lispro (ADMELOG) corrective regimen sliding scale   SubCutaneous three times a day before meals  insulin lispro Injectable (ADMELOG) 4 Unit(s) SubCutaneous three times a day before meals  levETIRAcetam 250 milliGRAM(s) Oral every 12 hours  levothyroxine 50 MICROGram(s) Oral daily  mycophenolate mofetil 1000 milliGRAM(s) Oral <User Schedule>  pantoprazole    Tablet 40 milliGRAM(s) Oral before breakfast  polyethylene glycol 3350 17 Gram(s) Oral daily  predniSONE   Tablet 5 milliGRAM(s) Oral daily  senna 2 Tablet(s) Oral at bedtime  tacrolimus ER Tablet (ENVARSUS XR) 1 milliGRAM(s) Oral <User Schedule>  trimethoprim   80 mG/sulfamethoxazole 400 mG 1 Tablet(s) Oral daily  valGANciclovir 450 milliGRAM(s) Oral <User Schedule>    PRN Inpatient Medications  acetaminophen     Tablet .. 650 milliGRAM(s) Oral every 6 hours PRN  dextrose Oral Gel 15 Gram(s) Oral once PRN      VITALS/PHYSICAL EXAM  --------------------------------------------------------------------------------  T(C): 36.6 (05-08-22 @ 08:43), Max: 36.9 (05-08-22 @ 01:00)  HR: 58 (05-08-22 @ 08:43) (58 - 65)  BP: 151/72 (05-08-22 @ 08:43) (130/60 - 179/77)  RR: 20 (05-08-22 @ 08:43) (18 - 20)  SpO2: 98% (05-08-22 @ 08:43) (98% - 100%)      05-07-22 @ 07:01  -  05-08-22 @ 07:00  --------------------------------------------------------  IN: 1500 mL / OUT: 3080 mL / NET: -1580 mL    05-08-22 @ 07:01  -  05-08-22 @ 11:17  --------------------------------------------------------  IN: 240 mL / OUT: 250 mL / NET: -10 mL      Physical Exam:  Alert, comfortably sitting in chair, frail and thin  No thrush   Left IJ tunneled dialysis catheter   Lungs clear b/l  Abdomen soft, RLQ wound clean, staples +, ALINA serosanguinous drainage   No LE edema   Alert and non focal       LABS/STUDIES  --------------------------------------------------------------------------------              8.6    7.18  >-----------<  225      [05-08-22 @ 07:10]              27.0     132  |  92  |  78  ----------------------------<  133      [05-08-22 @ 07:10]  3.9   |  25  |  3.21        Ca     8.9     [05-08-22 @ 07:10]      Mg     1.7     [05-08-22 @ 07:10]      Phos  3.3     [05-08-22 @ 07:10]    TPro  5.9  /  Alb  2.6  /  TBili  0.3  /  DBili  x   /  AST  18  /  ALT  6   /  AlkPhos  69  [05-08-22 @ 07:10]      Creatinine Trend:  SCr 3.21 [05-08 @ 07:10]  SCr 3.51 [05-07 @ 06:45]  SCr 3.72 [05-06 @ 06:35]  SCr 3.99 [05-05 @ 06:39]  SCr 4.14 [05-04 @ 04:27]    Tacrolimus (), Serum: 8.6 ng/mL (05-07 @ 06:48)  Tacrolimus (), Serum: 7.8 ng/mL (05-06 @ 06:37)  Tacrolimus (), Serum: 16.4 ng/mL (05-05 @ 06:39)  Tacrolimus (), Serum: 8.9 ng/mL (05-04 @ 04:35)      CAPILLARY BLOOD GLUCOSE  POCT Blood Glucose.: 156 mg/dL (08 May 2022 09:14)  POCT Blood Glucose.: 133 mg/dL (07 May 2022 21:09)  POCT Blood Glucose.: 225 mg/dL (07 May 2022 17:18)  POCT Blood Glucose.: 152 mg/dL (07 May 2022 12:27)        Iron 17, TIBC 171, %sat 10      [05-02-22 @ 13:03]  Ferritin 1505      [05-02-22 @ 13:05]  PTH -- (Ca 9.2)      [02-05-22 @ 10:13]   294  HbA1c 6.0      [02-21-20 @ 08:53]  Lipid: chol 118, TG 54, HDL 59, LDL --      [06-27-21 @ 09:44]    HBsAb 90363.2      [04-21-22 @ 14:13]  HBsAg Nonreact      [04-21-22 @ 14:13]  HBcAb Reactive      [04-21-22 @ 14:13]  HCV 0.25, Nonreact      [04-21-22 @ 14:13]  HIV Nonreact      [04-21-22 @ 14:47]

## 2022-05-08 NOTE — PROGRESS NOTE ADULT - SUBJECTIVE AND OBJECTIVE BOX
Transplant Surgery - Multidisciplinary Rounds  --------------------------------------------------------------  DDRT Date: 4/21/22    Pt seen and examined with multidisciplinary team:  Surgeon: Dr. Tena, Nephrologist: Dr. Nova, WALDO Wayne, unit RN. Rest of disciplines not in attendance to be notified of plan    66M with history of HTN, HLD, DM (on insulin), ESRD (2/2 DM and HTN) on HD via L PermCath MWF, AFib (on Eliquis 2.5mg BID), CVA (2019 2/2 afib), focal seizure (on depakote, keppra), CAD with stents, CABG (2020- was on Plavix), hx of nonbleeding gastric/duodenal ulcer (EGD 2/9/22, on protonix)    s/p R DCD DDRT (1A/1V/1U stented) on 4/21/2022 with Thymoglobulin Induction   - Donor: 56 y/o M, KDPI 82%, PHS high risk, terminal Cr 1.34, CMV pos, EBV pos, HCV OPAL neg)   - Graft: post op renal doppler with  good perfusion; course c/b delayed graft function requiring HD inpatient and on discharge.  - AFib: Eliquis was resumed post-op, now on hold; Plavix has been on hold post-op    -Readmitted on 4/28 with weakness, anemia  -s/p OR evacuation of hematoma and repair of bleeding by arterial anastomosis, graft bx on 5/2    Interval Events:  -POD#6  -h/h stable after Eliquis restarted  -ALINA with decreasing output  -generalized weakness improving, though OOB with heavy assist  -edema with significant improvement while on Lasix  -graft: improving DGF, last HD on 4/29. UO (~2.7L), weight and Cr improving daily  -rest of VSS; Afebrile    Immunosuppression:  -Envarsus per level, MMF 1/1, Pred 5  -ongoing monitoring for signs of rejection    Potential Discharge date: early this week    Education:  Medications    Plan of care:  See Below      MEDICATIONS  (STANDING):  apixaban 2.5 milliGRAM(s) Oral two times a day  atorvastatin 40 milliGRAM(s) Oral at bedtime  carvedilol 12.5 milliGRAM(s) Oral every 12 hours  chlorhexidine 2% Cloths 1 Application(s) Topical daily  dextrose 5%. 1000 milliLiter(s) (50 mL/Hr) IV Continuous <Continuous>  dextrose 5%. 1000 milliLiter(s) (100 mL/Hr) IV Continuous <Continuous>  dextrose 50% Injectable 25 Gram(s) IV Push once  dextrose 50% Injectable 12.5 Gram(s) IV Push once  dextrose 50% Injectable 25 Gram(s) IV Push once  diVALproex  milliGRAM(s) Oral <User Schedule>  diVALproex  milliGRAM(s) Oral <User Schedule>  epoetin cortney-epbx (RETACRIT) Injectable 22705 Unit(s) SubCutaneous every 7 days  fluconAZOLE   Tablet 200 milliGRAM(s) Oral daily  glucagon  Injectable 1 milliGRAM(s) IntraMuscular once  insulin glargine Injectable (LANTUS) 6 Unit(s) SubCutaneous at bedtime  insulin lispro (ADMELOG) corrective regimen sliding scale   SubCutaneous three times a day before meals  insulin lispro (ADMELOG) corrective regimen sliding scale   SubCutaneous at bedtime  insulin lispro Injectable (ADMELOG) 4 Unit(s) SubCutaneous three times a day before meals  levETIRAcetam 250 milliGRAM(s) Oral every 12 hours  levothyroxine 50 MICROGram(s) Oral daily  mycophenolate mofetil 1000 milliGRAM(s) Oral <User Schedule>  pantoprazole    Tablet 40 milliGRAM(s) Oral before breakfast  polyethylene glycol 3350 17 Gram(s) Oral daily  predniSONE   Tablet 5 milliGRAM(s) Oral daily  senna 2 Tablet(s) Oral at bedtime  tacrolimus ER Tablet (ENVARSUS XR) 1 milliGRAM(s) Oral <User Schedule>  trimethoprim   80 mG/sulfamethoxazole 400 mG 1 Tablet(s) Oral daily  valGANciclovir 450 milliGRAM(s) Oral <User Schedule>    MEDICATIONS  (PRN):  acetaminophen     Tablet .. 650 milliGRAM(s) Oral every 6 hours PRN Temp greater or equal to 38C (100.4F), Mild Pain (1 - 3)  dextrose Oral Gel 15 Gram(s) Oral once PRN Blood Glucose LESS THAN 70 milliGRAM(s)/deciliter          Vital Signs Last 24 Hrs  T(C): 36.6 (08 May 2022 08:43), Max: 36.9 (08 May 2022 01:00)  T(F): 97.9 (08 May 2022 08:43), Max: 98.5 (08 May 2022 01:00)  HR: 58 (08 May 2022 08:43) (58 - 65)  BP: 151/72 (08 May 2022 08:43) (130/60 - 179/77)  BP(mean): 111 (08 May 2022 01:00) (111 - 111)  RR: 20 (08 May 2022 08:43) (18 - 20)  SpO2: 98% (08 May 2022 08:43) (98% - 100%)    I&O's Summary    07 May 2022 07:01  -  08 May 2022 07:00  --------------------------------------------------------  IN: 1500 mL / OUT: 3080 mL / NET: -1580 mL    08 May 2022 07:01  -  08 May 2022 11:32  --------------------------------------------------------  IN: 240 mL / OUT: 250 mL / NET: -10 mL                              8.6    7.18  )-----------( 225      ( 08 May 2022 07:10 )             27.0     05-08    132<L>  |  92<L>  |  78<H>  ----------------------------<  133<H>  3.9   |  25  |  3.21<H>    Ca    8.9      08 May 2022 07:10  Phos  3.3     05-08  Mg     1.7     05-08    TPro  5.9<L>  /  Alb  2.6<L>  /  TBili  0.3  /  DBili  x   /  AST  18  /  ALT  6<L>  /  AlkPhos  69  05-08    Tacrolimus (), Serum: 8.6 ng/mL (05-07 @ 06:48)        Culture - Body Fluid with Gram Stain (collected 05-03-22 @ 02:12)  Source: .Body Fluid BLOOD CLOT  Gram Stain (05-03-22 @ 06:37):    No polymorphonuclear cells seen per low power field    No organisms seen per oil power field  Final Report (05-08-22 @ 07:18):    No growth at 5 days                Review of Systems: Gen: No weight changes, fatigue, fevers/chills, +weakness  Skin: No rashes  Head/Eyes/Ears/Mouth: No headache; Normal hearing; Normal vision w/o blurriness; No sinus pain/discomfort, sore throat  Respiratory: No dyspnea, cough, wheezing, hemoptysis  CV: No chest pain, PND, orthopnea  GI: mild abdominal pain, no diarrhea, constipation, nausea, vomiting, melena, hematochezia  : No increased frequency, dysuria, hematuria, nocturia  MSK: No joint pain/swelling; no back pain; no edema  Neuro: No dizziness/lightheadedness, weakness, seizures, numbness, tingling  Heme: No easy bruising or bleeding  Endo: No heat/cold intolerance  Psych: No significant nervousness, anxiety, stress, depression  All other systems were reviewed and are negative, except as noted    Physical Exam:   Constitutional: Well developed / well nourished  Eyes: Anicteric, PERRLA  ENMT: nc/at, no thrush  Neck: Supple  Respiratory: CTA B/L  Cardiovascular: RRR  Gastrointestinal: Soft abdomen, appropriate incisional TTP.  incision c/d/i.  ALINA x1 ss  Genitourinary:  good UO  Extremities: SCD's in place and working bilaterally. edema has resolved. L PermCath c/d/i. R midline c/d/i.  Vascular: Palpable dp pulses bilaterally  Neurological: A&O x3  Musculoskeletal: Moving all extremities, generalized weakness  Psychiatric: Responsive

## 2022-05-08 NOTE — PROGRESS NOTE ADULT - ASSESSMENT
66M with history of HTN, HLD, DM (on insulin), ESRD (2/2 DM and HTN) on HD via L permacath since 2019 MWF, AFib, CVA (2019 2/2 afib), seizures, CABG (2020), gastric/duodenal ulcers, s/p DCD DDRT (1A/1V/1U stented) on 4/21/2022 with Thymoglobulin Induction c/b DGF requiring HD.  Re-admitted for anemia 2/2 perinephric hematoma s/p OR evacuation on 5/2    s/p R DCD DDRT c/b perinephric hematoma s/p OR evacuation (POD#6)  -h/h stable, no concerns for active bleeding at this time on home dose Eliquis 2.5BID  -graft: DGF initially, but has been off HD since 4/29, Cr downtrending, with improving UO and edema.  Stop Lasix.  Will continue to hold HD per Nephrology  -graft bx intra-op negative  -abdominal binder while OOB  -PT/SCDs/Spirometry  -ADAT  -will determine L PermCath need as Cr decreases    Immunosuppression  -Envarsus per level, MMF 1/1, Pred 5  -PPx: Fluc for 1 month, Bactrim, Valcyte (renal dosing), PPI    DM  -on Lispro/pre-meal/LUCIA, adjust prn    AFib/HTN  -will stop Plavix  -will continue Eliquis as above  -adjust Coreg prn, add Procardia prn    Seizures  -continue home meds    DISPO  -if continues to remain stable, will plan for d/c to [facility]) per family request tomorrow  -will be discharged home with ALINA drain  -to f/u with Dr. Barber 1 week post d/c  -will need ureteral stent removed in 4-6 weeks

## 2022-05-08 NOTE — PROGRESS NOTE ADULT - ASSESSMENT
66 yr old man with ESRD due to DM on HD since 2019  s/p DDRT on 4/21/22 complicated by DGF .   Donor: 57 year old male, KDPI 82%, PHS high risk, terminal Cr 1.34, CMV pos, EBV pos, HCV OPAL neg   PMH : Diabetes type II on insulin, HTN, CAD s/p CABG in 2020, CVA in 2019, Afib on Eliquis, Hypothyroidism,  Seizure on Keppra, Depakote added recently for breakthrough seizures.   Had GI bleed in february 2022, EGD showed non bleeding gastric and duodenal ulcers, treated with PPI.   He is admitted with increased lethargy and symptomatic severe anemia with hematoma around allograft. S/p hematoma evacuation and repair of arterial anastomosis on 5/2/22.     1. S/p DDRT on 4/21/22 -  complicated by DGF, last HD done on 4/29/22. Graft function improving slowly. Creatinine 3.2mg/dL today, good UOP. Euvolemic on exam.      D/c Lasix. Electrolytes fair. No need for dialysis.     2. Immunosuppression - S/p Thymo induction. Continue Envarsus, target trough 8-10, continue Cellcept 1gm po bid, prednisone 5mg daily      Infection prophylaxis - Continue Valcyte bactrim, fluconazole     3. Peritransplant hematoma s/p evacuation and repair of anastomotic leak. On empiric fluconazole. H/H stable. Continue epoetin 10,000 units sq weekly     4. A.fib with h/o CVA - Eliquis resumed yesterday. Continue 2.5mg po bid (home dose), continue to monitor ALINA drain and H/H     5. HTN - Continue Coreg 12.5mg po bid, If BP remains high can add Amlodipine 5mg daily (home med)     6. DM - Continue Lantus 6 and Admelog pre meal and sliding scale     7. Seizure d/o  -  continue Depakote and Keppra

## 2022-05-09 NOTE — PROGRESS NOTE ADULT - NS ATTEND AMEND GEN_ALL_CORE FT
DCD KTX(CMV +/+, EBV +/+)
Cr downtrending. If continues, can d/c permacath tomorrow.  D/C drain  Preparations for xfer to rehab facility.   Immuno: tac/cellcept/pred.  Actively monitoring levels and adjusting dose.
OR evacuation of hematoma and repair of bleeding by arterial anastomosis, graft bx on 5/2  Immunosuppression  -Envarsus per level, MMF 1/1, Pred 5  -PPx: Nystatin, Bactrim, Valcyte (renal dosing), PPI UOP> 2 liters today. Scr ~4 mg/dl. Improving.
DCD KTX(CMV +/+, EBV +/+)
Pt is making urine and HD was held.

## 2022-05-09 NOTE — PROGRESS NOTE ADULT - SUBJECTIVE AND OBJECTIVE BOX
Transplant Surgery - Multidisciplinary Rounds  --------------------------------------------------------------  R DCD DDRT  4/21/22    Pt seen and examined with multidisciplinary team:  Surgeon: Dr. David, Nephrologist: Dr. Alfred, Pharmacist: WALDO Lim/Dr. Mcrae, unit RN. Rest of disciplines not in attendance to be notified of plan    66M with history of HTN, HLD, DM (on insulin), ESRD (2/2 DM and HTN) on HD via L PermCath MWF, AFib (on Eliquis 2.5mg BID), CVA (2019 2/2 afib), focal seizure (on depakote, keppra), CAD with stents, CABG (2020- was on Plavix), hx of nonbleeding gastric/duodenal ulcer (EGD 2/9/22, on protonix)    s/p R DCD DDRT (1A/1V/1U stented) on 4/21/2022 with Thymoglobulin Induction   - Donor: 58 y/o M, KDPI 82%, PHS high risk, terminal Cr 1.34, CMV pos, EBV pos, HCV OPAL neg)   - Graft: post op renal doppler with  good perfusion; course c/b delayed graft function requiring HD inpatient and on discharge.  - AFib: Eliquis was resumed post-op, now on hold; Plavix has been on hold post-op    -Readmitted on 4/28 with weakness, anemia  -s/p OR evacuation of hematoma and repair of bleeding by arterial anastomosis, graft bx on 5/2    Interval Events:  -POD#7  -h/h stable after Eliquis restarted  -ALINA with decreasing output  -generalized weakness improving, though OOB with heavy assist  -edema has resolved, now off Lasix  -good graft function. last HD on 4/29. UO (~2L), weight and Cr improving daily  -rest of VSS; Afebrile    Immunosuppression:  -Envarsus per level, MMF 1/1, Pred 5  -ongoing monitoring for signs of rejection    Potential Discharge date: tomorrow    Education:  Medications    Plan of care:  See Below      MEDICATIONS  (STANDING):  atorvastatin 40 milliGRAM(s) Oral at bedtime  carvedilol 25 milliGRAM(s) Oral every 12 hours  chlorhexidine 2% Cloths 1 Application(s) Topical daily  dextrose 5%. 1000 milliLiter(s) (50 mL/Hr) IV Continuous <Continuous>  dextrose 5%. 1000 milliLiter(s) (100 mL/Hr) IV Continuous <Continuous>  dextrose 50% Injectable 25 Gram(s) IV Push once  dextrose 50% Injectable 12.5 Gram(s) IV Push once  dextrose 50% Injectable 25 Gram(s) IV Push once  diVALproex  milliGRAM(s) Oral <User Schedule>  diVALproex  milliGRAM(s) Oral <User Schedule>  epoetin cortney-epbx (RETACRIT) Injectable 68469 Unit(s) SubCutaneous every 7 days  fluconAZOLE   Tablet 200 milliGRAM(s) Oral daily  glucagon  Injectable 1 milliGRAM(s) IntraMuscular once  insulin glargine Injectable (LANTUS) 6 Unit(s) SubCutaneous at bedtime  insulin lispro (ADMELOG) corrective regimen sliding scale   SubCutaneous three times a day before meals  insulin lispro (ADMELOG) corrective regimen sliding scale   SubCutaneous at bedtime  insulin lispro Injectable (ADMELOG) 4 Unit(s) SubCutaneous three times a day before meals  levETIRAcetam 250 milliGRAM(s) Oral every 12 hours  levothyroxine 50 MICROGram(s) Oral daily  magnesium oxide 400 milliGRAM(s) Oral three times a day with meals  mycophenolate mofetil 1000 milliGRAM(s) Oral <User Schedule>  pantoprazole    Tablet 40 milliGRAM(s) Oral before breakfast  polyethylene glycol 3350 17 Gram(s) Oral daily  predniSONE   Tablet 5 milliGRAM(s) Oral daily  senna 2 Tablet(s) Oral at bedtime  tacrolimus ER Tablet (ENVARSUS XR) 2 milliGRAM(s) Oral <User Schedule>  trimethoprim   80 mG/sulfamethoxazole 400 mG 1 Tablet(s) Oral daily  valGANciclovir 450 milliGRAM(s) Oral <User Schedule>    MEDICATIONS  (PRN):  acetaminophen     Tablet .. 650 milliGRAM(s) Oral every 6 hours PRN Temp greater or equal to 38C (100.4F), Mild Pain (1 - 3)  dextrose Oral Gel 15 Gram(s) Oral once PRN Blood Glucose LESS THAN 70 milliGRAM(s)/deciliter        Vital Signs Last 24 Hrs  T(C): 36.4 (09 May 2022 09:02), Max: 36.9 (08 May 2022 21:00)  T(F): 97.6 (09 May 2022 09:02), Max: 98.4 (08 May 2022 21:00)  HR: 59 (09 May 2022 09:02) (59 - 98)  BP: 171/64 (09 May 2022 09:02) (145/72 - 176/78)  BP(mean): 3 (09 May 2022 04:53) (3 - 3)  RR: 20 (09 May 2022 09:02) (18 - 20)  SpO2: 100% (09 May 2022 09:02) (97% - 100%)    I&O's Summary    08 May 2022 07:01  -  09 May 2022 07:00  --------------------------------------------------------  IN: 1540 mL / OUT: 2105 mL / NET: -565 mL    09 May 2022 07:01  -  09 May 2022 12:15  --------------------------------------------------------  IN: 0 mL / OUT: 435 mL / NET: -435 mL                              8.2    5.98  )-----------( 235      ( 09 May 2022 06:15 )             25.2     05-09    133<L>  |  96  |  76<H>  ----------------------------<  159<H>  4.2   |  24  |  3.00<H>    Ca    8.4      09 May 2022 06:12  Phos  3.3     05-09  Mg     1.7     05-09    TPro  5.5<L>  /  Alb  2.3<L>  /  TBili  0.3  /  DBili  x   /  AST  15  /  ALT  7<L>  /  AlkPhos  72  05-09    Tacrolimus (), Serum: 4.4 ng/mL (05-08 @ 07:16)        Culture - Body Fluid with Gram Stain (collected 05-03-22 @ 02:12)  Source: .Body Fluid BLOOD CLOT  Gram Stain (05-03-22 @ 06:37):    No polymorphonuclear cells seen per low power field    No organisms seen per oil power field  Final Report (05-08-22 @ 07:18):    No growth at 5 days              Review of Systems: Gen: No weight changes, fatigue, fevers/chills, +weakness  Skin: No rashes  Head/Eyes/Ears/Mouth: No headache; Normal hearing; Normal vision w/o blurriness; No sinus pain/discomfort, sore throat  Respiratory: No dyspnea, cough, wheezing, hemoptysis  CV: No chest pain, PND, orthopnea  GI: mild abdominal pain, no diarrhea, constipation, nausea, vomiting, melena, hematochezia  : No increased frequency, dysuria, hematuria, nocturia  MSK: No joint pain/swelling; no back pain; no edema  Neuro: No dizziness/lightheadedness, weakness, seizures, numbness, tingling  Heme: No easy bruising or bleeding  Endo: No heat/cold intolerance  Psych: No significant nervousness, anxiety, stress, depression  All other systems were reviewed and are negative, except as noted    Physical Exam:   Constitutional: Well developed / well nourished  Eyes: Anicteric, PERRLA  ENMT: nc/at, no thrush  Neck: Supple  Respiratory: CTA B/L  Cardiovascular: RRR  Gastrointestinal: Soft abdomen, appropriate incisional TTP, minimal.  incision c/d/i.  ALINA x1 ss  Genitourinary:  good UO  Extremities: SCD's in place and working bilaterally. edema has resolved. L PermCath c/d/i. R midline c/d/i.  Vascular: Palpable dp pulses bilaterally  Neurological: A&O x3  Musculoskeletal: Moving all extremities, generalized weakness  Psychiatric: Responsive

## 2022-05-09 NOTE — PROGRESS NOTE ADULT - ASSESSMENT
66M with history of HTN, HLD, DM (on insulin), ESRD (2/2 DM and HTN) on HD via permacath since 3yrs ago MWF, afib (on eliquis 2.5mg BID), hx of stroke (2019 2/2 afib), focal seizure (on depakote, keppra), hx of CABG (2020), hx of nonbleeding gastric/duodenal ulcer (EGD 2/9/22, on protonix), s/p DCD KTX, came back due to weakness    DDRT 4/21/2022    course c/b delayed graft function requiring HD   - HD per transplant team --currently being held as renal function improving. Last HD on 4/29 with UOP 1.9L  in the last 24 hrs. Off lasix.    - Continue to monitor urine outpt and renal function. SCr continues to trend down from peak 6.5 to 3   - Immunosuppressant per transplant team     HTN:  Elevated   -Titrate coreg to 25 mg Po q12  -Monitor BP closely     Anemia:  CT A/P revealed a large hematoma. s/p hematoma evacuation and repair of arterial anastomosis on 5/2.  monitor Hb 8.2 today. On Epo 10K weekly. Keep Hg ~10 due to hx of CAD      If you have any questions, please feel free to contact me  Steffi Cali  Nephrology Fellow  Pager NS: [phone]/ [facility]: [phone]    (After 5 pm or on weekends please page the on-call fellow, can check [email or web address] for schedule. Login is jesus cristobal, schedule under [facility] medicine, psych, derm)           66M with history of HTN, HLD, DM (on insulin), ESRD (2/2 DM and HTN) on HD via permacath since 3yrs ago MWF, afib (on eliquis 2.5mg BID), hx of stroke (2019 2/2 afib), focal seizure (on depakote, keppra), hx of CABG (2020), hx of nonbleeding gastric/duodenal ulcer (EGD 2/9/22, on protonix), s/p DCD KTX, came back due to weakness. Found to have a  large hematoma. s/p evacuation and repair of arterial anastomosis on 5/2.      DDRT 4/21/2022    course c/b delayed graft function requiring HD, now off HD  - HD per transplant team --currently being held as renal function improving. Last HD on 4/29 with UOP 1.9L  in the last 24 hrs. Off lasix.    - Continue to monitor urine outpt and renal function. SCr continues to trend down from peak 6.5 to 3   - Tacro level 4.0. Increase envarsus from 2 mg to 3 mg PO qD. Continue tacro for goal 8-10  -MMF 1/1, Pred 5  -PPx: Fluc for 1 month, Bactrim, Valcyte (renal dosing), PPI        HTN:  Elevated   -Titrate coreg to 25 mg Po q12  -Monitor BP closely     Anemia:  CT A/P revealed a large hematoma. s/p hematoma evacuation and repair of arterial anastomosis on 5/2.  monitor Hb 8.2 today. On Epo 10K weekly. Keep Hg ~10 due to hx of CAD      If you have any questions, please feel free to contact andrea Cali  Nephrology Fellow  Pager NS: [phone]/ [facility]: [phone]    (After 5 pm or on weekends please page the on-call fellow, can check [email or web address] for schedule. Login is jesus [email or web address], schedule under [facility] medicine, psych, derm)

## 2022-05-09 NOTE — PROGRESS NOTE ADULT - SUBJECTIVE AND OBJECTIVE BOX
[facility] DIVISION OF KIDNEY DISEASES AND HYPERTENSION -- FOLLOW UP NOTE  --------------------------------------------------------------------------------      24 hour events/subjective: Pateint seen & examined. Vitals/ labs/ medications reviewed. ALINA with decreasing output 45cc in 24 hrs. Last HD on 4/29 with UOP 1.9L  in the last 24 hrs         PAST HISTORY  --------------------------------------------------------------------------------  No significant changes to PMH, PSH, FHx, SHx, unless otherwise noted    ALLERGIES & MEDICATIONS  --------------------------------------------------------------------------------  Allergies    No Known Allergies    Intolerances      Standing Inpatient Medications  atorvastatin 40 milliGRAM(s) Oral at bedtime  carvedilol 25 milliGRAM(s) Oral every 12 hours  chlorhexidine 2% Cloths 1 Application(s) Topical daily  dextrose 5%. 1000 milliLiter(s) IV Continuous <Continuous>  dextrose 5%. 1000 milliLiter(s) IV Continuous <Continuous>  dextrose 50% Injectable 25 Gram(s) IV Push once  dextrose 50% Injectable 12.5 Gram(s) IV Push once  dextrose 50% Injectable 25 Gram(s) IV Push once  diVALproex  milliGRAM(s) Oral <User Schedule>  diVALproex  milliGRAM(s) Oral <User Schedule>  epoetin cortney-epbx (RETACRIT) Injectable 03970 Unit(s) SubCutaneous every 7 days  fluconAZOLE   Tablet 200 milliGRAM(s) Oral daily  glucagon  Injectable 1 milliGRAM(s) IntraMuscular once  insulin glargine Injectable (LANTUS) 6 Unit(s) SubCutaneous at bedtime  insulin lispro (ADMELOG) corrective regimen sliding scale   SubCutaneous three times a day before meals  insulin lispro (ADMELOG) corrective regimen sliding scale   SubCutaneous at bedtime  insulin lispro Injectable (ADMELOG) 4 Unit(s) SubCutaneous three times a day before meals  levETIRAcetam 250 milliGRAM(s) Oral every 12 hours  levothyroxine 50 MICROGram(s) Oral daily  magnesium oxide 400 milliGRAM(s) Oral three times a day with meals  mycophenolate mofetil 1000 milliGRAM(s) Oral <User Schedule>  pantoprazole    Tablet 40 milliGRAM(s) Oral before breakfast  polyethylene glycol 3350 17 Gram(s) Oral daily  predniSONE   Tablet 5 milliGRAM(s) Oral daily  senna 2 Tablet(s) Oral at bedtime  tacrolimus ER Tablet (ENVARSUS XR) 2 milliGRAM(s) Oral <User Schedule>  trimethoprim   80 mG/sulfamethoxazole 400 mG 1 Tablet(s) Oral daily  valGANciclovir 450 milliGRAM(s) Oral <User Schedule>    PRN Inpatient Medications  acetaminophen     Tablet .. 650 milliGRAM(s) Oral every 6 hours PRN  dextrose Oral Gel 15 Gram(s) Oral once PRN      REVIEW OF SYSTEMS  --------------------------------------------------------------------------------  Gen: No fatigue, fevers/chills, weakness  Head/Eyes/Ears/Mouth: No headache  Respiratory: No dyspnea, cough  CV: No chest pain  GI: No abdominal pain, diarrhea, constipation, nausea, vomiting  Transplant: No pain  : No increased frequency, dysuria, hematuria  MSK:  no edema  Neuro: No dizziness/lightheadedness      All other systems were reviewed and are negative, except as noted.    VITALS/PHYSICAL EXAM  --------------------------------------------------------------------------------  T(C): 36.8 (05-09-22 @ 12:36), Max: 36.9 (05-08-22 @ 21:00)  HR: 57 (05-09-22 @ 12:36) (57 - 98)  BP: 163/74 (05-09-22 @ 12:36) (145/72 - 176/78)  RR: 20 (05-09-22 @ 12:36) (18 - 20)  SpO2: 99% (05-09-22 @ 12:36) (97% - 100%)  Wt(kg): --        05-08-22 @ 07:01  -  05-09-22 @ 07:00  --------------------------------------------------------  IN: 1540 mL / OUT: 2105 mL / NET: -565 mL    05-09-22 @ 07:01  -  05-09-22 @ 12:43  --------------------------------------------------------  IN: 0 mL / OUT: 435 mL / NET: -435 mL      Physical Exam:  	Gen: NAD  	HEENT: supple neck  	Pulm: CTA B/L  	CV: RRR, S1S2; no rub  	Abd: +BS, soft, nontender/nondistended,  RLQ wound c/d/i, + ALINA serosanguinous drainage  	Ext: no edema b/l  	Neuro: No focal deficits  	Psych: Normal affect and mood  	Skin: Warm, without rashes              Vascular: Left IJ tunneled dialysis catheter         LABS/STUDIES  --------------------------------------------------------------------------------              8.2    5.98  >-----------<  235      [05-09-22 @ 06:15]              25.2     133  |  96  |  76  ----------------------------<  159      [05-09-22 @ 06:12]  4.2   |  24  |  3.00        Ca     8.4     [05-09-22 @ 06:12]      Mg     1.7     [05-09-22 @ 06:12]      Phos  3.3     [05-09-22 @ 06:12]    TPro  5.5  /  Alb  2.3  /  TBili  0.3  /  DBili  x   /  AST  15  /  ALT  7   /  AlkPhos  72  [05-09-22 @ 06:12]          Creatinine Trend:  SCr 3.00 [05-09 @ 06:12]  SCr 3.21 [05-08 @ 07:10]  SCr 3.51 [05-07 @ 06:45]  SCr 3.72 [05-06 @ 06:35]  SCr 3.99 [05-05 @ 06:39]    Tacrolimus (), Serum: 4.4 ng/mL (05-08 @ 07:16)  Tacrolimus (), Serum: 8.6 ng/mL (05-07 @ 06:48)  Tacrolimus (), Serum: 7.8 ng/mL (05-06 @ 06:37)  Tacrolimus (), Serum: 16.4 ng/mL (05-05 @ 06:39)                Iron 17, TIBC 171, %sat 10      [05-02-22 @ 13:03]  Ferritin 1505      [05-02-22 @ 13:05]  PTH -- (Ca 9.2)      [02-05-22 @ 10:13]   294  HbA1c 6.0      [02-21-20 @ 08:53]  Lipid: chol 118, TG 54, HDL 59, LDL --      [06-27-21 @ 09:44]    HBsAb 67450.2      [04-21-22 @ 14:13]  HBsAg Nonreact      [04-21-22 @ 14:13]  HBcAb Reactive      [04-21-22 @ 14:13]  HCV 0.25, Nonreact      [04-21-22 @ 14:13]  HIV Nonreact      [04-21-22 @ 14:47]

## 2022-05-09 NOTE — PROGRESS NOTE ADULT - ASSESSMENT
66M with history of HTN, HLD, DM (on insulin), ESRD (2/2 DM and HTN) on HD via permacath since 3yrs ago MWF, afib (on eliquis 2.5mg BID), hx of stroke (2019 2/2 afib), focal seizure (on depakote, keppra), hx of CABG (2020), hx of nonbleeding gastric/duodenal ulcer (EGD 2/9/22, on protonix), s/p DCD KTX, came back due to weakness    DDRT 4/21/2022    course c/b delayed graft function requiring HD    - HD per transplant team --currently being held as renal function improving   - Continue to monitor urine outpt and renal function   - Immunosuppressant per transplant team     HTN:  Elevated   -Titrate coreg  -Monitor BP closely     Anemia:  CT A/P revealed a large hematoma. s/p hematoma evacuation and repair of arterial anastomosis on 5/2.  monitor Hb

## 2022-05-09 NOTE — PROVIDER CONTACT NOTE (OTHER) - BACKGROUND
Dx. WEAKNESS, ESRD. s/p DDRT, seizures, CAD, CVA,Hypothyroidism, HTN, HLD, DM
Pt. admitted for weakness, s/p R DCD DDRT 4/21/22., PMHX HTN, CAD, seizures.
Pt. admitted for weakness, s/p OR evacuation of hematoma on 5/2
Dx Weakness, CAD, Seizures, CVA, ESRD-s/p DDRT

## 2022-05-09 NOTE — CONSULT NOTE ADULT - ASSESSMENT
66M with history of HTN, HLD, DM (on insulin), ESRD (2/2 DM and HTN) on HD via permacath since 3yrs ago MWF, afib (on eliquis 2.5mg BID), hx of stroke (2019 2/2 afib), focal seizure (on depakote, keppra), hx of CABG (2020), hx of nonbleeding gastric/duodenal ulcer (EGD 2/9/22, on protonix), s/p DDRT, came back due to weakness    s/p  DCD DDRT ( 1A/1V/1U stented) on 4/21/2022 with Thymoglobulin Induction   - Donor: Donor: 56 y/o M, KDPI 82%, PHS high risk, terminal Cr 1.34, CMV pos, EBV pos, HCV OPAL neg)   - Graft: post op renal doppler with  good perfusion; course c/b delayed graft function requiring HD    - A fib: resumed Eliquis; Plavix not restarted yet  - Immunosuppressant per transplant team  - Remains HD dependent, HD as needed. His regular schedule as outpatient is MWF  - Monitor urine output  -s/w to plan for rehab in [facility]     HTN:  -Controlled  -Monitor BP    Anemia:  -Hb low  -may need BT after d/w transplant team    MBD:  Calcium corrected is normal  Po4 level
Interventional Radiology    Evaluate for Procedure:     HPI: Mr. Castelan is a 67 yo Male with PMH of Afib (on Eliquis) and ESRD (2/2 DM and HTN). Patient is s/p DDRT on 22. Hospital course was complicated by delayed graft function and peritransplant hematoma s/p evacuation and anastomotic leak repair on 22. Patient currently has good graft function and no longer requires HD. IR was consulted for Permacath removal prior to discharge.     Allergies:   Medications (Abx/Cardiac/Anticoagulation/Blood Products)    apixaban: 2.5 milliGRAM(s) Oral ( @ 05:52)  carvedilol: 6.25 milliGRAM(s) Oral ( @ 17:48)  carvedilol: 12.5 milliGRAM(s) Oral ( @ 05:52)  carvedilol: 6.25 milliGRAM(s) Oral ( @ 21:11)  carvedilol: 25 milliGRAM(s) Oral ( @ 10:06)  fluconAZOLE   Tablet: 200 milliGRAM(s) Oral ( @ 11:22)  furosemide    Tablet: 40 milliGRAM(s) Oral ( @ 05:10)  hydrALAZINE Injectable: 10 milliGRAM(s) IV Push ( @ 01:37)  hydrALAZINE Injectable: 10 milliGRAM(s) IV Push ( @ 01:38)  trimethoprim   80 mG/sulfamethoxazole 400 m Tablet(s) Oral ( @ 11:24)  valGANciclovir: 450 milliGRAM(s) Oral ( @ 07:55)    Data:    T(C): 36.4  HR: 59  BP: 171/64  RR: 20  SpO2: 100%    -WBC 5.98 / HgB 8.2 / Hct 25.2 / Plt 235  -Na 133 / Cl 96 / BUN 76 / Glucose 159  -K 4.2 / CO2 24 / Cr 3.00  -ALT 7 / Alk Phos 72 / T.Bili 0.3  -INR 1.15 / PTT 23.9      Radiology:   CXR 22 reviewed    Assessment/Plan:   Mr. Castelan is a 67 yo Male with PMH of Afib (on Eliquis) and ESRD (2/2 DM and HTN). Patient is s/p DDRT on 22 and currently has good graft function, no longer requiring HD. IR was consulted for Permacath removal prior to discharge.     -- IR will plan to remove Permacath on 5/10/22  -- Please obtain am CBC, BMP, Coags, and Type and Screen   -- Please maintain COVID PCR within 5 days of procedure  -- Hold eliquis as of now  -- Continue to hold eliquis for at least 24-48 hours after the procedure  -- DDAVP prior to procedure due to elevated BUN  -- please place IR procedure request order under Dr. Fortune
66 year old male PMH ESRD (2/2 DM and HTN) s/p DCD DDRT (CMV +/+, EBV +/+) on 4/21/202, HTN, HLD, DM (on insulin), A-fib (on eliquis 2.5mg BID), hx of stroke (2019 2/2 afib), focal seizure (on depakote, keppra), hx of CABG (2020), hx of nonbleeding gastric/duodenal ulcer (EGD 2/9/22, on protonix) who presented to [facility] as his family was not able take care of patient at home.     CT A/P (5/2) with Large right extraperitoneal hematoma, displacing the transplant kidney medially and displacing bowel to the left.     On 5/2 s/p OR hematoma evaluation.     At this point do not have high suspicion for superinfection of hematoma but reasonable to empirically cover pending cultures    #Hematoma, Leukocytosis  --Continue Zosyn pending operative hematoma drainaghe cultures  --Doubt need for Fluconazole  --Continue to follow CBC with diff  --Continue to follow temperature curve  --Follow up on preliminary OR culture    #Renal Transplant Recipient (CMV +/+, EBV +/+)   --Continue Valcyte 450 mg M/W/F  --Continue Bactrim for PCP PPx    I will continue to follow. Please feel free to contact me with any further questions.    Carlton Hoover M.D.  [facility] Division of Infectious Disease  8AM-5PM Monday - Friday: Available on [employer] Teams  After Hours and Holidays (or if no response on [employer] Teams): Please contact the Infectious Diseases Office at [phone]    The above assessment and plan were discussed with transplant surgery team   
66 yr old man with ESRD due to DM on HD since 2019 presenting for DDRT. Pt underwent DDRT on 4/21/22 with DGF and HD dependant presenting with increased lethargy.

## 2022-05-09 NOTE — PROVIDER CONTACT NOTE (OTHER) - ACTION/TREATMENT ORDERED:
EKG and stat labs done, pain meds and antacid given
AWAITING ORDERS  at 0634-1 unit of PRBC ordered, endorse to Day RN to follow up
MD Rodriguez made aware. Will administer hydralazine IVP as per order. Will continue to monitor.
MD Prashant Acosta made aware. Will medicate with hydralazine 10mg IVP as per order. Will continue to monitor.

## 2022-05-09 NOTE — PROVIDER CONTACT NOTE (OTHER) - ASSESSMENT
Pt. A&Ox4, VS as charted. Pt. symptomatic, denies headache, dizziness, SOB, c/p.
pt's H/H 6.9/20.8
Pt A&Ox4, VS as charted. Pt. asymptomatic and denies headache, c/p.
pt. c/o rt. chest pain,5/10

## 2022-05-09 NOTE — PROGRESS NOTE ADULT - ASSESSMENT
66M with history of HTN, HLD, DM (on insulin), ESRD (2/2 DM and HTN) on HD via L permacath since 2019 MWF, AFib, CVA (2019 2/2 afib), seizures, CABG (2020), gastric/duodenal ulcers, s/p DCD DDRT (1A/1V/1U stented) on 4/21/2022 with Thymoglobulin Induction c/b DGF requiring HD.  Re-admitted for anemia 2/2 perinephric hematoma s/p OR evacuation on 5/2    s/p R DCD DDRT c/b perinephric hematoma s/p OR evacuation (POD#7)  -h/h stable, no concerns for active bleeding at this time on home dose Eliquis 2.5BID  -graft: DGF initially, but has been off HD since 4/29, Cr downtrending, with improving UO and edema.  Now off Lasix. no HD required  -graft bx intra-op negative  -d/c L PermCath tomorrow with IR give improved graft function, NPO pMN  -d/c ALINA  -abdominal binder while OOB  -PT/SCDs/Spirometry  -ADAT    Immunosuppression  -Envarsus per level, MMF 1/1, Pred 5  -PPx: Fluc for 1 month, Bactrim, Valcyte (renal dosing), PPI    DM  -on Lispro/pre-meal/LUCIA, adjust prn    AFib/HTN  -will stop Plavix going forward  -will continue Eliquis as above (held for IR tomorrow)  -increase Coreg, add Procardia prn    Seizures  -continue home meds    DISPO  -if continues to remain stable, will plan for d/c to [place] per family request tomorrow  -to f/u with Dr. Barber 1 week post d/c  -will need ureteral stent removed in 4-6 weeks

## 2022-05-09 NOTE — PROGRESS NOTE ADULT - SUBJECTIVE AND OBJECTIVE BOX
[facility]   MD NINA MASON MD RUORU WONG, PA    TEL:  FROM 9 AM to 5 PM ---OFFICE: [phone]    FROM 5 PM - 9 AM PLEASE CALL ANSWERING SERVICE: [phone]    RENAL FOLLOW UP NOTE--Date of Service 05-09-22 @ 09:09  --------------------------------------------------------------------------------  HPI:      Pt seen and examined at bedside.       PAST HISTORY  --------------------------------------------------------------------------------  No significant changes to PMH, PSH, FHx, SHx, unless otherwise noted    ALLERGIES & MEDICATIONS  --------------------------------------------------------------------------------  Allergies    No Known Allergies    Intolerances      Standing Inpatient Medications  apixaban 2.5 milliGRAM(s) Oral two times a day  atorvastatin 40 milliGRAM(s) Oral at bedtime  carvedilol 12.5 milliGRAM(s) Oral every 12 hours  chlorhexidine 2% Cloths 1 Application(s) Topical daily  dextrose 5%. 1000 milliLiter(s) IV Continuous <Continuous>  dextrose 5%. 1000 milliLiter(s) IV Continuous <Continuous>  dextrose 50% Injectable 25 Gram(s) IV Push once  dextrose 50% Injectable 12.5 Gram(s) IV Push once  dextrose 50% Injectable 25 Gram(s) IV Push once  diVALproex  milliGRAM(s) Oral <User Schedule>  diVALproex  milliGRAM(s) Oral <User Schedule>  epoetin cortney-epbx (RETACRIT) Injectable 13958 Unit(s) SubCutaneous every 7 days  fluconAZOLE   Tablet 200 milliGRAM(s) Oral daily  glucagon  Injectable 1 milliGRAM(s) IntraMuscular once  insulin glargine Injectable (LANTUS) 6 Unit(s) SubCutaneous at bedtime  insulin lispro (ADMELOG) corrective regimen sliding scale   SubCutaneous three times a day before meals  insulin lispro (ADMELOG) corrective regimen sliding scale   SubCutaneous at bedtime  insulin lispro Injectable (ADMELOG) 4 Unit(s) SubCutaneous three times a day before meals  levETIRAcetam 250 milliGRAM(s) Oral every 12 hours  levothyroxine 50 MICROGram(s) Oral daily  magnesium oxide 400 milliGRAM(s) Oral three times a day with meals  mycophenolate mofetil 1000 milliGRAM(s) Oral <User Schedule>  pantoprazole    Tablet 40 milliGRAM(s) Oral before breakfast  polyethylene glycol 3350 17 Gram(s) Oral daily  predniSONE   Tablet 5 milliGRAM(s) Oral daily  senna 2 Tablet(s) Oral at bedtime  tacrolimus ER Tablet (ENVARSUS XR) 2 milliGRAM(s) Oral <User Schedule>  trimethoprim   80 mG/sulfamethoxazole 400 mG 1 Tablet(s) Oral daily  valGANciclovir 450 milliGRAM(s) Oral <User Schedule>    PRN Inpatient Medications  acetaminophen     Tablet .. 650 milliGRAM(s) Oral every 6 hours PRN  dextrose Oral Gel 15 Gram(s) Oral once PRN      REVIEW OF SYSTEMS  --------------------------------------------------------------------------------  General: no fever  MSK: no edema     VITALS/PHYSICAL EXAM  --------------------------------------------------------------------------------  T(C): 36.4 (05-09-22 @ 09:02), Max: 36.9 (05-08-22 @ 21:00)  HR: 59 (05-09-22 @ 09:02) (59 - 98)  BP: 171/64 (05-09-22 @ 09:02) (145/72 - 176/78)  RR: 20 (05-09-22 @ 09:02) (18 - 20)  SpO2: 100% (05-09-22 @ 09:02) (97% - 100%)  Wt(kg): --        05-08-22 @ 07:01  -  05-09-22 @ 07:00  --------------------------------------------------------  IN: 1540 mL / OUT: 2105 mL / NET: -565 mL      Physical Exam:  	Gen: NAD  	HEENT: MMM  	Pulm: CTA B/L  	CV: S1S2  	Abd: Soft, +BS  	Ext: No LE edema B/L                      Neuro: Awake   	Skin: Warm and Dry   	Vascular access: NO avf          FABIÁN tucker  LABS/STUDIES  --------------------------------------------------------------------------------              8.2    5.98  >-----------<  235      [05-09-22 @ 06:15]              25.2     133  |  96  |  76  ----------------------------<  159      [05-09-22 @ 06:12]  4.2   |  24  |  3.00        Ca     8.4     [05-09-22 @ 06:12]      Mg     1.7     [05-09-22 @ 06:12]      Phos  3.3     [05-09-22 @ 06:12]    TPro  5.5  /  Alb  2.3  /  TBili  0.3  /  DBili  x   /  AST  15  /  ALT  7   /  AlkPhos  72  [05-09-22 @ 06:12]          Creatinine Trend:  SCr 3.00 [05-09 @ 06:12]  SCr 3.21 [05-08 @ 07:10]  SCr 3.51 [05-07 @ 06:45]  SCr 3.72 [05-06 @ 06:35]  SCr 3.99 [05-05 @ 06:39]        Iron 17, TIBC 171, %sat 10      [05-02-22 @ 13:03]  Ferritin 1505      [05-02-22 @ 13:05]  PTH -- (Ca 9.2)      [02-05-22 @ 10:13]   294  HbA1c 6.0      [02-21-20 @ 08:53]  Lipid: chol 118, TG 54, HDL 59, LDL --      [06-27-21 @ 09:44]    HBsAb 94486.2      [04-21-22 @ 14:13]  HBsAg Nonreact      [04-21-22 @ 14:13]  HBcAb Reactive      [04-21-22 @ 14:13]  HCV 0.25, Nonreact      [04-21-22 @ 14:13]  HIV Nonreact      [04-21-22 @ 14:47]

## 2022-05-10 NOTE — PROGRESS NOTE ADULT - SUBJECTIVE AND OBJECTIVE BOX
Transplant Surgery - Multidisciplinary Rounds  --------------------------------------------------------------  R DCD DDRT  4/21/22    Pt seen and examined with multidisciplinary team:  Surgeon: Dr. David, Nephrologist: Dr. Alfred, Pharmacist: WALDO Lim/Dr. Mcrae, unit RN. Rest of disciplines not in attendance to be notified of plan    66M with history of HTN, HLD, DM (on insulin), ESRD (2/2 DM and HTN) on HD via L PermCath MWF, AFib (on Eliquis 2.5mg BID), CVA (2019 2/2 afib), focal seizure (on depakote, keppra), CAD with stents, CABG (2020- was on Plavix), hx of nonbleeding gastric/duodenal ulcer (EGD 2/9/22, on protonix)    s/p R DCD DDRT (1A/1V/1U stented) on 4/21/2022 with Thymoglobulin Induction   - Donor: 58 y/o M, KDPI 82%, PHS high risk, terminal Cr 1.34, CMV pos, EBV pos, HCV OPAL neg)   - Graft: post op renal doppler with  good perfusion; course c/b delayed graft function requiring HD inpatient and on discharge.  - AFib: Eliquis was resumed post-op, now on hold; Plavix has been on hold post-op    -Readmitted on 4/28 with weakness, anemia  -s/p OR evacuation of hematoma and repair of bleeding by arterial anastomosis, graft bx on 5/2    Interval Events:  -POD#8  - Scr 2.91 from 3.0    UOP 1.9L   Last HD on 4/29  - Eliquis held for permacath removal today  - generalized weakness improving, though OOB with heavy assist    Immunosuppression:  -Envarsus per level, MMF 1/1, Pred 5  -ongoing monitoring for signs of rejection    Potential Discharge date: today    Education:  Medications    Plan of care:  See Below      MEDICATIONS  (STANDING):  atorvastatin 40 milliGRAM(s) Oral at bedtime  carvedilol 25 milliGRAM(s) Oral every 12 hours  chlorhexidine 2% Cloths 1 Application(s) Topical daily  desmopressin IVPB 20 MICROGram(s) IV Intermittent once  dextrose 5%. 1000 milliLiter(s) (50 mL/Hr) IV Continuous <Continuous>  dextrose 5%. 1000 milliLiter(s) (100 mL/Hr) IV Continuous <Continuous>  dextrose 50% Injectable 25 Gram(s) IV Push once  dextrose 50% Injectable 12.5 Gram(s) IV Push once  dextrose 50% Injectable 25 Gram(s) IV Push once  diVALproex  milliGRAM(s) Oral <User Schedule>  diVALproex  milliGRAM(s) Oral <User Schedule>  epoetin cortney-epbx (RETACRIT) Injectable 78380 Unit(s) SubCutaneous every 7 days  fluconAZOLE   Tablet 200 milliGRAM(s) Oral daily  glucagon  Injectable 1 milliGRAM(s) IntraMuscular once  insulin glargine Injectable (LANTUS) 6 Unit(s) SubCutaneous at bedtime  insulin lispro (ADMELOG) corrective regimen sliding scale   SubCutaneous three times a day before meals  insulin lispro (ADMELOG) corrective regimen sliding scale   SubCutaneous at bedtime  insulin lispro Injectable (ADMELOG) 4 Unit(s) SubCutaneous three times a day before meals  levETIRAcetam 250 milliGRAM(s) Oral every 12 hours  levothyroxine 50 MICROGram(s) Oral daily  magnesium oxide 400 milliGRAM(s) Oral three times a day with meals  mycophenolate mofetil 1000 milliGRAM(s) Oral <User Schedule>  pantoprazole    Tablet 40 milliGRAM(s) Oral before breakfast  polyethylene glycol 3350 17 Gram(s) Oral daily  predniSONE   Tablet 5 milliGRAM(s) Oral daily  senna 2 Tablet(s) Oral at bedtime  tacrolimus ER Tablet (ENVARSUS XR) 3 milliGRAM(s) Oral <User Schedule>  trimethoprim   80 mG/sulfamethoxazole 400 mG 1 Tablet(s) Oral daily  valGANciclovir 450 milliGRAM(s) Oral <User Schedule>    MEDICATIONS  (PRN):  acetaminophen     Tablet .. 650 milliGRAM(s) Oral every 6 hours PRN Temp greater or equal to 38C (100.4F), Mild Pain (1 - 3)  dextrose Oral Gel 15 Gram(s) Oral once PRN Blood Glucose LESS THAN 70 milliGRAM(s)/deciliter      PAST MEDICAL & SURGICAL HISTORY:  Hypertension    Diabetes    Dyslipidemia    CAD (Coronary Artery Disease)  with Stents in 06/2009 and 6/2019, s/p off-pump C3L on 8/13/20    Hypothyroidism    CVA (cerebral vascular accident)  12/13/19 with residual bilateral weakness    Anemia    PEG (percutaneous endoscopic gastrostomy) status  removed July 2020    Intubation of airway performed without difficulty  Dec 2019  CVA c/b status epilepticus requiring intubation and PEG in 12/2019-    ESRD on dialysis  M/W/F    Seizures  after CVA, last seizure was last week 4/07/22    History of insertion of stent into coronary artery bypass graft  2009 and 2019    S/P CABG x 3  off pump C3L on 8/13/20        Vital Signs Last 24 Hrs  T(C): 36.4 (10 May 2022 08:47), Max: 37.6 (10 May 2022 01:00)  T(F): 97.6 (10 May 2022 08:47), Max: 99.7 (10 May 2022 01:00)  HR: 61 (10 May 2022 08:47) (57 - 70)  BP: 118/62 (10 May 2022 08:47) (118/62 - 177/74)  BP(mean): --  RR: 20 (10 May 2022 08:47) (18 - 20)  SpO2: 99% (10 May 2022 08:47) (95% - 100%)    I&O's Summary    09 May 2022 07:01  -  10 May 2022 07:00  --------------------------------------------------------  IN: 1190 mL / OUT: 1960 mL / NET: -770 mL    10 May 2022 07:01  -  10 May 2022 11:49  --------------------------------------------------------  IN: 240 mL / OUT: 375 mL / NET: -135 mL                              7.9    6.11  )-----------( 217      ( 10 May 2022 04:57 )             24.6     05-10    135  |  100  |  71<H>  ----------------------------<  91  5.0   |  22  |  2.91<H>    Ca    8.6      10 May 2022 04:57  Phos  2.8     05-10  Mg     1.6     05-10    TPro  5.6<L>  /  Alb  2.4<L>  /  TBili  0.3  /  DBili  x   /  AST  17  /  ALT  8<L>  /  AlkPhos  72  05-10    Tacrolimus (), Serum: 4.0 ng/mL (05-09 @ 06:11)        Review of Systems: Gen: No weight changes, fatigue, fevers/chills, +weakness  Skin: No rashes  Head/Eyes/Ears/Mouth: No headache; Normal hearing; Normal vision w/o blurriness; No sinus pain/discomfort, sore throat  Respiratory: No dyspnea, cough, wheezing, hemoptysis  CV: No chest pain, PND, orthopnea  GI: mild abdominal pain, no diarrhea, constipation, nausea, vomiting, melena, hematochezia  : No increased frequency, dysuria, hematuria, nocturia  MSK: No joint pain/swelling; no back pain; no edema  Neuro: No dizziness/lightheadedness, weakness, seizures, numbness, tingling  Heme: No easy bruising or bleeding  Endo: No heat/cold intolerance  Psych: No significant nervousness, anxiety, stress, depression  All other systems were reviewed and are negative, except as noted    Physical Exam:   Constitutional: Well developed / well nourished  Eyes: Anicteric, PERRLA  ENMT: nc/at, no thrush  Neck: Supple  Respiratory: CTA B/L  Cardiovascular: RRR  Gastrointestinal: Soft abdomen, appropriate incisional TTP, minimal.  incision c/d/i.  ALINA x1 ss  Genitourinary:  good UO  Extremities: SCD's in place and working bilaterally. edema resolved. L PermCath c/d/i. R midline c/d/i.  Vascular: Palpable dp pulses bilaterally  Neurological: A&O x3  Musculoskeletal: Moving all extremities, generalized weakness  Psychiatric: Responsive

## 2022-05-10 NOTE — PROGRESS NOTE ADULT - SUBJECTIVE AND OBJECTIVE BOX
[facility] DIVISION OF KIDNEY DISEASES AND HYPERTENSION -- FOLLOW UP NOTE  --------------------------------------------------------------------------------      24 hour events/subjective: Pateint seen & examined. Vitals/ labs/ medications reviewed. ALINA with decreasing output to 15 cc in 24 hrs. UOP 1.9L  in the last 24 hrs. COmplaing of itching. No signs of external bleeding            PAST HISTORY  --------------------------------------------------------------------------------  No significant changes to PMH, PSH, FHx, SHx, unless otherwise noted    ALLERGIES & MEDICATIONS  --------------------------------------------------------------------------------  Allergies    No Known Allergies    Intolerances      Standing Inpatient Medications  atorvastatin 40 milliGRAM(s) Oral at bedtime  carvedilol 25 milliGRAM(s) Oral every 12 hours  chlorhexidine 2% Cloths 1 Application(s) Topical daily  desmopressin IVPB 20 MICROGram(s) IV Intermittent once  dextrose 5%. 1000 milliLiter(s) IV Continuous <Continuous>  dextrose 5%. 1000 milliLiter(s) IV Continuous <Continuous>  dextrose 50% Injectable 25 Gram(s) IV Push once  dextrose 50% Injectable 12.5 Gram(s) IV Push once  dextrose 50% Injectable 25 Gram(s) IV Push once  diVALproex  milliGRAM(s) Oral <User Schedule>  diVALproex  milliGRAM(s) Oral <User Schedule>  epoetin cortney-epbx (RETACRIT) Injectable 43196 Unit(s) SubCutaneous every 7 days  fluconAZOLE   Tablet 200 milliGRAM(s) Oral daily  glucagon  Injectable 1 milliGRAM(s) IntraMuscular once  insulin glargine Injectable (LANTUS) 6 Unit(s) SubCutaneous at bedtime  insulin lispro (ADMELOG) corrective regimen sliding scale   SubCutaneous three times a day before meals  insulin lispro (ADMELOG) corrective regimen sliding scale   SubCutaneous at bedtime  insulin lispro Injectable (ADMELOG) 4 Unit(s) SubCutaneous three times a day before meals  levETIRAcetam 250 milliGRAM(s) Oral every 12 hours  levothyroxine 50 MICROGram(s) Oral daily  magnesium oxide 400 milliGRAM(s) Oral three times a day with meals  mycophenolate mofetil 1000 milliGRAM(s) Oral <User Schedule>  pantoprazole    Tablet 40 milliGRAM(s) Oral before breakfast  polyethylene glycol 3350 17 Gram(s) Oral daily  predniSONE   Tablet 5 milliGRAM(s) Oral daily  senna 2 Tablet(s) Oral at bedtime  tacrolimus ER Tablet (ENVARSUS XR) 3 milliGRAM(s) Oral <User Schedule>  trimethoprim   80 mG/sulfamethoxazole 400 mG 1 Tablet(s) Oral daily  valGANciclovir 450 milliGRAM(s) Oral <User Schedule>    PRN Inpatient Medications  acetaminophen     Tablet .. 650 milliGRAM(s) Oral every 6 hours PRN  dextrose Oral Gel 15 Gram(s) Oral once PRN      REVIEW OF SYSTEMS  --------------------------------------------------------------------------------      All other systems were reviewed and are negative, except as noted.    VITALS/PHYSICAL EXAM  --------------------------------------------------------------------------------  T(C): 36.4 (05-10-22 @ 08:47), Max: 37.6 (05-10-22 @ 01:00)  HR: 61 (05-10-22 @ 08:47) (57 - 70)  BP: 118/62 (05-10-22 @ 08:47) (118/62 - 177/74)  RR: 20 (05-10-22 @ 08:47) (18 - 20)  SpO2: 99% (05-10-22 @ 08:47) (95% - 100%)  Wt(kg): --        05-09-22 @ 07:01  -  05-10-22 @ 07:00  --------------------------------------------------------  IN: 1190 mL / OUT: 1960 mL / NET: -770 mL    05-10-22 @ 07:01  -  05-10-22 @ 11:58  --------------------------------------------------------  IN: 240 mL / OUT: 375 mL / NET: -135 mL      Physical Exam:  	Gen: NAD  	HEENT: supple neck  	Pulm: CTA B/L  	CV: RRR, S1S2; no rub  	Abd: +BS, soft, nontender/nondistended,  RLQ wound c/d/i, + ALINA serosanguinous drainage  	Ext: no edema b/l  	Neuro: No focal deficits  	Psych: Normal affect and mood  	Skin: Warm, without rashes, stage 2 decub              Vascular: Left IJ tunneled dialysis catheter           LABS/STUDIES  --------------------------------------------------------------------------------              7.9    6.11  >-----------<  217      [05-10-22 @ 04:57]              24.6     135  |  100  |  71  ----------------------------<  91      [05-10-22 @ 04:57]  5.0   |  22  |  2.91        Ca     8.6     [05-10-22 @ 04:57]      Mg     1.6     [05-10-22 @ 04:57]      Phos  2.8     [05-10-22 @ 04:57]    TPro  5.6  /  Alb  2.4  /  TBili  0.3  /  DBili  x   /  AST  17  /  ALT  8   /  AlkPhos  72  [05-10-22 @ 04:57]    PT/INR: PT 14.5 , INR 1.25       [05-10-22 @ 04:57]  PTT: 31.9       [05-10-22 @ 04:57]      Creatinine Trend:  SCr 2.91 [05-10 @ 04:57]  SCr 3.00 [05-09 @ 06:12]  SCr 3.21 [05-08 @ 07:10]  SCr 3.51 [05-07 @ 06:45]  SCr 3.72 [05-06 @ 06:35]    Tacrolimus (), Serum: 4.0 ng/mL (05-09 @ 06:11)  Tacrolimus (), Serum: 4.4 ng/mL (05-08 @ 07:16)  Tacrolimus (), Serum: 8.6 ng/mL (05-07 @ 06:48)  Tacrolimus (), Serum: 7.8 ng/mL (05-06 @ 06:37)                Iron 17, TIBC 171, %sat 10      [05-02-22 @ 13:03]  Ferritin 1505      [05-02-22 @ 13:05]  PTH -- (Ca 9.2)      [02-05-22 @ 10:13]   294  HbA1c 6.0      [02-21-20 @ 08:53]  Lipid: chol 118, TG 54, HDL 59, LDL --      [06-27-21 @ 09:44]    HBsAb 14051.2      [04-21-22 @ 14:13]  HBsAg Nonreact      [04-21-22 @ 14:13]  HBcAb Reactive      [04-21-22 @ 14:13]  HCV 0.25, Nonreact      [04-21-22 @ 14:13]  HIV Nonreact      [04-21-22 @ 14:47]

## 2022-05-10 NOTE — PROCEDURE NOTE - PROCEDURE FINDINGS AND DETAILS
Successful removal of a left sided tunnelled HD catheter using blunt dissection. Direct jugular and site pressure applied. Hemostasis achieved. Dressing applied.

## 2022-05-10 NOTE — PROGRESS NOTE ADULT - ASSESSMENT
66M with history of HTN, HLD, DM (on insulin), ESRD (2/2 DM and HTN) on HD via permacath since 3yrs ago MWF, afib (on eliquis 2.5mg BID), hx of stroke (2019 2/2 afib), focal seizure (on depakote, keppra), hx of CABG (2020), hx of nonbleeding gastric/duodenal ulcer (EGD 2/9/22, on protonix), s/p DCD KTX, came back due to weakness    DDRT 4/21/2022    course c/b delayed graft function requiring HD    - HD per transplant team --currently being held as renal function improving   - Continue to monitor urine outpt and renal function   -Planned for perma cath removal today  - Immunosuppressant per transplant team     HTN:  improving   -Titrate coreg  -Monitor BP closely     Anemia:  CT A/P revealed a large hematoma. s/p hematoma evacuation and repair of arterial anastomosis on 5/2.  monitor Hb

## 2022-05-10 NOTE — PROGRESS NOTE ADULT - SUBJECTIVE AND OBJECTIVE BOX
[facility] NEPHROLOGY PRACT[facility]   MD NINA MASON MD RUORU WONG, PA    TEL:  FROM 9 AM to 5 PM ---OFFICE: [phone]    FROM 5 PM - 9 AM PLEASE CALL ANSWERING SERVICE: [phone]    RENAL FOLLOW UP NOTE--Date of Service 05-10-22 @ 09:26  --------------------------------------------------------------------------------  HPI:      Pt seen and examined at bedside.   Keeganies SOB, chest pain     PAST HISTORY  --------------------------------------------------------------------------------  No significant changes to PMH, PSH, FHx, SHx, unless otherwise noted    ALLERGIES & MEDICATIONS  --------------------------------------------------------------------------------  Allergies    No Known Allergies    Intolerances      Standing Inpatient Medications  atorvastatin 40 milliGRAM(s) Oral at bedtime  carvedilol 25 milliGRAM(s) Oral every 12 hours  chlorhexidine 2% Cloths 1 Application(s) Topical daily  dextrose 5%. 1000 milliLiter(s) IV Continuous <Continuous>  dextrose 5%. 1000 milliLiter(s) IV Continuous <Continuous>  dextrose 50% Injectable 25 Gram(s) IV Push once  dextrose 50% Injectable 12.5 Gram(s) IV Push once  dextrose 50% Injectable 25 Gram(s) IV Push once  diVALproex  milliGRAM(s) Oral <User Schedule>  diVALproex  milliGRAM(s) Oral <User Schedule>  epoetin cortney-epbx (RETACRIT) Injectable 65676 Unit(s) SubCutaneous every 7 days  fluconAZOLE   Tablet 200 milliGRAM(s) Oral daily  glucagon  Injectable 1 milliGRAM(s) IntraMuscular once  insulin glargine Injectable (LANTUS) 6 Unit(s) SubCutaneous at bedtime  insulin lispro (ADMELOG) corrective regimen sliding scale   SubCutaneous three times a day before meals  insulin lispro (ADMELOG) corrective regimen sliding scale   SubCutaneous at bedtime  insulin lispro Injectable (ADMELOG) 4 Unit(s) SubCutaneous three times a day before meals  levETIRAcetam 250 milliGRAM(s) Oral every 12 hours  levothyroxine 50 MICROGram(s) Oral daily  magnesium oxide 400 milliGRAM(s) Oral three times a day with meals  mycophenolate mofetil 1000 milliGRAM(s) Oral <User Schedule>  pantoprazole    Tablet 40 milliGRAM(s) Oral before breakfast  polyethylene glycol 3350 17 Gram(s) Oral daily  predniSONE   Tablet 5 milliGRAM(s) Oral daily  senna 2 Tablet(s) Oral at bedtime  tacrolimus ER Tablet (ENVARSUS XR) 3 milliGRAM(s) Oral <User Schedule>  trimethoprim   80 mG/sulfamethoxazole 400 mG 1 Tablet(s) Oral daily  valGANciclovir 450 milliGRAM(s) Oral <User Schedule>    PRN Inpatient Medications  acetaminophen     Tablet .. 650 milliGRAM(s) Oral every 6 hours PRN  dextrose Oral Gel 15 Gram(s) Oral once PRN      REVIEW OF SYSTEMS  --------------------------------------------------------------------------------  General: no fever  CVS: no chest pain  RESP: no sob, no cough  ABD: no abdominal pain  : no dysuria,  MSK: no edema     VITALS/PHYSICAL EXAM  --------------------------------------------------------------------------------  T(C): 36.4 (05-10-22 @ 08:47), Max: 37.6 (05-10-22 @ 01:00)  HR: 61 (05-10-22 @ 08:47) (57 - 70)  BP: 118/62 (05-10-22 @ 08:47) (118/62 - 177/74)  RR: 20 (05-10-22 @ 08:47) (18 - 20)  SpO2: 99% (05-10-22 @ 08:47) (95% - 100%)  Wt(kg): --        05-09-22 @ 07:01  -  05-10-22 @ 07:00  --------------------------------------------------------  IN: 1190 mL / OUT: 1960 mL / NET: -770 mL      Physical Exam:  	Gen: NAD  	HEENT: MMM  	Pulm: CTA B/L  	CV: S1S2  	Abd: Soft, +BS  	Ext: No LE edema B/L                      Neuro: Awake   	Skin: Warm and Dry   	Vascular access:  HD catheter           FABIÁN tucker  LABS/STUDIES  --------------------------------------------------------------------------------              7.9    6.11  >-----------<  217      [05-10-22 @ 04:57]              24.6     135  |  100  |  71  ----------------------------<  91      [05-10-22 @ 04:57]  5.0   |  22  |  2.91        Ca     8.6     [05-10-22 @ 04:57]      Mg     1.6     [05-10-22 @ 04:57]      Phos  2.8     [05-10-22 @ 04:57]    TPro  5.6  /  Alb  2.4  /  TBili  0.3  /  DBili  x   /  AST  17  /  ALT  8   /  AlkPhos  72  [05-10-22 @ 04:57]    PT/INR: PT 14.5 , INR 1.25       [05-10-22 @ 04:57]  PTT: 31.9       [05-10-22 @ 04:57]      Creatinine Trend:  SCr 2.91 [05-10 @ 04:57]  SCr 3.00 [05-09 @ 06:12]  SCr 3.21 [05-08 @ 07:10]  SCr 3.51 [05-07 @ 06:45]  SCr 3.72 [05-06 @ 06:35]        Iron 17, TIBC 171, %sat 10      [05-02-22 @ 13:03]  Ferritin 1505      [05-02-22 @ 13:05]  PTH -- (Ca 9.2)      [02-05-22 @ 10:13]   294  HbA1c 6.0      [02-21-20 @ 08:53]  Lipid: chol 118, TG 54, HDL 59, LDL --      [06-27-21 @ 09:44]    HBsAb 29483.2      [04-21-22 @ 14:13]  HBsAg Nonreact      [04-21-22 @ 14:13]  HBcAb Reactive      [04-21-22 @ 14:13]  HCV 0.25, Nonreact      [04-21-22 @ 14:13]  HIV Nonreact      [04-21-22 @ 14:47]

## 2022-05-10 NOTE — PROGRESS NOTE ADULT - ASSESSMENT
66M with history of HTN, HLD, DM (on insulin), ESRD (2/2 DM and HTN) on HD via permacath since 3yrs ago MWF, afib (on eliquis 2.5mg BID), hx of stroke (2019 2/2 afib), focal seizure (on depakote, keppra), hx of CABG (2020), hx of nonbleeding gastric/duodenal ulcer (EGD 2/9/22, on protonix), s/p DCD KTX, came back due to weakness. Found to have a  large hematoma. s/p evacuation and repair of arterial anastomosis on 5/2.

## 2022-05-10 NOTE — PRE PROCEDURE NOTE - PRE PROCEDURE EVALUATION
Interventional Radiology    HPI: 66M with history of HTN, HLD, DM (on insulin), ESRD (2/2 DM and HTN) on HD via permacath since 3yrs ago MWF, afib (on eliquis 2.5mg BID), hx of stroke ( 2/2 afib), focal seizure (on depakote, keppra), hx of CABG (), hx of nonbleeding gastric/duodenal ulcer (EGD 22, on protonix), s/p DCD KTX, came back due to weakness. Patient now off HD with functioning graft. He presents for removal of left sided tunnelled HD catheter placed on 2020.       Allergies:   Medications (Abx/Cardiac/Anticoagulation/Blood Products)    apixaban: 2.5 milliGRAM(s) Oral ( @ 05:52)  carvedilol: 12.5 milliGRAM(s) Oral ( @ 05:52)  carvedilol: 25 milliGRAM(s) Oral (05-10 @ 06:27)  fluconAZOLE   Tablet: 200 milliGRAM(s) Oral (05-10 @ 11:37)  hydrALAZINE Injectable: 10 milliGRAM(s) IV Push ( @ 01:37)  NIFEdipine XL: 30 milliGRAM(s) Oral ( @ 22:43)  trimethoprim   80 mG/sulfamethoxazole 400 m Tablet(s) Oral (05-10 @ 11:38)  valGANciclovir: 450 milliGRAM(s) Oral ( @ 07:55)    Data:    T(C): 36.8  HR: 60  BP: 132/64  RR: 18  SpO2: 99%    Exam  General: No acute distress  Chest: Non labored breathing      -WBC 6.11 / HgB 7.9 / Hct 24.6 / Plt 217  -Na 135 / Cl 100 / BUN 71 / Glucose 91  -K 5.0 / CO2 22 / Cr 2.91  -ALT 8 / Alk Phos 72 / T.Bili 0.3  -INR1.25    Imaging: < from: IR Procedure (20 @ 09:36) >  Impression: Successful conversion of a nontunneled dialysis catheter to a tunneled dialysis catheter in the left internal jugular vein; tip at superior cavoatrial junction.    < end of copied text >      Plan: 66y Male presents for Removal of tunnelled HD catheter  -Risks/Benefits/alternatives explained with the patient and/or healthcare proxy and witnessed informed consent obtained.

## 2022-05-10 NOTE — PROGRESS NOTE ADULT - ASSESSMENT
66M with history of HTN, HLD, DM (on insulin), ESRD (2/2 DM and HTN) on HD via L permacath since 2019 MWF, AFib, CVA (2019 2/2 afib), seizures, CABG (2020), gastric/duodenal ulcers, s/p DCD DDRT (1A/1V/1U stented) on 4/21/2022 with Thymoglobulin Induction c/b DGF requiring HD.  Re-admitted for anemia 2/2 perinephric hematoma s/p OR evacuation on 5/2    s/p R DCD DDRT c/b perinephric hematoma s/p OR evacuation (POD#8)  -graft: DGF initially, off HD since 4/29. Off diuretics. Cr downtrending, with improving UO and edema  -graft bx intra-op negative  -d/c L PermCath today.  Give DDAVP prior to IR  - 1U PRBC today  -abdominal binder while OOB  -PT/SCDs/Spirometry  -ADAT    Immunosuppression  -Envarsus per level, MMF 1/1, Pred 5  -PPx: Fluc for 1 month, Bactrim, Valcyte (renal dosing), PPI    DM  -on Lispro/pre-meal/LUCIA, adjust prn    AFib/HTN  -will stop Plavix going forward  - Plan to resume Eliquis tomorrow post- IR procedure if labs remain stable  - Continue Coreg. Add Procardia prn    Seizures  -continue home meds    DISPO  - plan for d/c to [facility] possibly later today  -to f/u with Dr. Barber 1 week post d/c  -will need ureteral stent removed in 4-6 weeks

## 2022-05-11 NOTE — DISCHARGE NOTE NURSING/CASE MANAGEMENT/SOCIAL WORK - NSDCVIVACCINE_GEN_ALL_CORE_FT
Tdap; 24-Jun-2021 20:00; Marcela Horn (RN); [employer]; [id number] (Exp. Date: 04-Sep-2022); IntraMuscular; Deltoid Left.; 0.5 milliLiter(s); VIS (VIS Published: 09-May-2013, VIS Presented: 24-Jun-2021);   Tdap; 09-Mar-2022 07:35; Wilson Agrawal (RN); [employer]; [id number] (Exp. Date: 28-Sep-2023); IntraMuscular; Deltoid Left.; 0.5 milliLiter(s); VIS (VIS Published: 09-May-2013, VIS Presented: 09-Mar-2022);

## 2022-05-11 NOTE — PROGRESS NOTE ADULT - ASSESSMENT
66M with history of HTN, HLD, DM (on insulin), ESRD (2/2 DM and HTN) on HD via permacath since 3yrs ago MWF, afib (on eliquis 2.5mg BID), hx of stroke (2019 2/2 afib), focal seizure (on depakote, keppra), hx of CABG (2020), hx of nonbleeding gastric/duodenal ulcer (EGD 2/9/22, on protonix), s/p DCD KTX, came back due to weakness    DDRT 4/21/2022    course c/b delayed graft function requiring HD    - HD held as renal function improving   - Continue to monitor urine outpt and renal function   -s/p perma cath removal 5/10  - Immunosuppressant per transplant team     HTN:  Fluctuating  -Monitor BP closely     Anemia:  CT A/P revealed a large hematoma. s/p hematoma evacuation and repair of arterial anastomosis on 5/2.  monitor Hb

## 2022-05-11 NOTE — PROGRESS NOTE ADULT - PROBLEM SELECTOR PLAN 1
Pt. with DGF with suboptimal urine output and HD dependant. Last HD done on 4/27/22.  Continue Lasix 80 mg PO BID.   Pt will need to continue maintenance HD as per MWF schedule. Will monitor for need for HD.   Plan for HD today. HD orders placed and discussed with RN.   Continue Renvela 800 mg TID.   Continue PO sodium bicarbonate.   Primary Nephrologist Dr. Rey/Iliana.   Monitor labs and urine output.
DDRT 4/21/2022   -course c/b delayed graft function requiring HD, now off HD. Last HD on 4/29   -Renal function improving. UOP 1.3L  in the last 24 hrs. Off lasix.    - Continue to monitor urine outpt and renal function. SCr continues to trend down from peak 6.5 to 2.8  - s/p hematoma evacuation and repair of arterial anastomosis on 5/2.  -Intraop graft bx on 5/2 with 'benign kidney parenchyma'.   -PermCath removed yesterday. s/p Give DDAVP & 1 PRBC given prior to IR    - Tacro level 6.0 yesterday. Increased envarsus from 3 mg to 4 mg PO qD. Check level today. Continue tacro for goal 8-10  -MMF 1/1, Pred 5  -PPx: Fluc for 1 month, Bactrim, Valcyte (renal dosing), PPI
DDRT 4/21/2022   -course c/b delayed graft function requiring HD, now off HD. Last HD on 4/29   -Renal function improving. UOP 1.9L  in the last 24 hrs. Off lasix.    - Continue to monitor urine outpt and renal function. SCr continues to trend down from peak 6.5 to 2.9  - s/p hematoma evacuation and repair of arterial anastomosis on 5/2.  -Intraop graft bx on 5/2 with 'benign kidney parenchyma'.   -PermCath to be removed today  - Tacro level 4.0 yesterday. Increased envarsus from 2 mg to 3 mg PO qD. Check level today. Continue tacro for goal 8-10  -MMF 1/1, Pred 5  -PPx: Fluc for 1 month, Bactrim, Valcyte (renal dosing), PPI

## 2022-05-11 NOTE — PROGRESS NOTE ADULT - ASSESSMENT
66M with history of HTN, HLD, DM (on insulin), ESRD (2/2 DM and HTN) on HD via L permacath since 2019 MWF, AFib, CVA (2019 2/2 afib), seizures, CABG (2020), gastric/duodenal ulcers, s/p DCD DDRT (1A/1V/1U stented) on 4/21/2022 with Thymoglobulin Induction c/b DGF requiring HD.  Re-admitted for anemia 2/2 perinephric hematoma s/p OR evacuation on 5/2    s/p R DCD DDRT c/b perinephric hematoma s/p OR evacuation (POD#9)  -graft: DGF initially, off HD since 4/29. Off diuretics. Cr downtrending, with improving UO and edema  -graft bx intra-op negative  -abdominal binder while OOB  -PT/SCDs/Spirometry  -ADAT    Immunosuppression  -Envarsus per level, MMF 1/1, Pred 5  -PPx: Fluc for 1 month, Bactrim, Valcyte (renal dosing), PPI    DM  -on Lispro/pre-meal/LUCIA, adjust prn    AFib/HTN  -will stop Plavix going forward  - Resume Eliquis 2.5mg BID today  - Continue Coreg. Add Procardia prn    Seizures  -continue home meds    DISPO  - DC to [place] today  - to f/u with Dr. Barber next Monday   - will need ureteral stent removed in 4-6 weeks

## 2022-05-11 NOTE — PROGRESS NOTE ADULT - SUBJECTIVE AND OBJECTIVE BOX
[facility] DIVISION OF KIDNEY DISEASES AND HYPERTENSION -- FOLLOW UP NOTE  --------------------------------------------------------------------------------      24 hour events/subjective: Pateint seen & examined. Vitals/ labs/ medications reviewed. s/p removal of tunneled HD cath. ALINA drain removed. UOP 1.3L  in the last 24 hrs. Complains of itching.         PAST HISTORY  --------------------------------------------------------------------------------  No significant changes to PMH, PSH, FHx, SHx, unless otherwise noted    ALLERGIES & MEDICATIONS  --------------------------------------------------------------------------------  Allergies    No Known Allergies    Intolerances      Standing Inpatient Medications  apixaban 2.5 milliGRAM(s) Oral two times a day  atorvastatin 40 milliGRAM(s) Oral at bedtime  carvedilol 25 milliGRAM(s) Oral every 12 hours  chlorhexidine 2% Cloths 1 Application(s) Topical daily  dextrose 5%. 1000 milliLiter(s) IV Continuous <Continuous>  dextrose 5%. 1000 milliLiter(s) IV Continuous <Continuous>  dextrose 50% Injectable 25 Gram(s) IV Push once  dextrose 50% Injectable 12.5 Gram(s) IV Push once  dextrose 50% Injectable 25 Gram(s) IV Push once  diVALproex  milliGRAM(s) Oral <User Schedule>  diVALproex  milliGRAM(s) Oral <User Schedule>  epoetin cortney-epbx (RETACRIT) Injectable 92277 Unit(s) SubCutaneous every 7 days  fluconAZOLE   Tablet 200 milliGRAM(s) Oral daily  glucagon  Injectable 1 milliGRAM(s) IntraMuscular once  insulin glargine Injectable (LANTUS) 6 Unit(s) SubCutaneous at bedtime  insulin lispro (ADMELOG) corrective regimen sliding scale   SubCutaneous three times a day before meals  insulin lispro (ADMELOG) corrective regimen sliding scale   SubCutaneous at bedtime  insulin lispro Injectable (ADMELOG) 4 Unit(s) SubCutaneous three times a day before meals  levETIRAcetam 250 milliGRAM(s) Oral every 12 hours  levothyroxine 50 MICROGram(s) Oral daily  magnesium oxide 400 milliGRAM(s) Oral three times a day with meals  mycophenolate mofetil 1000 milliGRAM(s) Oral <User Schedule>  pantoprazole    Tablet 40 milliGRAM(s) Oral before breakfast  polyethylene glycol 3350 17 Gram(s) Oral daily  predniSONE   Tablet 5 milliGRAM(s) Oral daily  senna 2 Tablet(s) Oral at bedtime  tacrolimus ER Tablet (ENVARSUS XR) 4 milliGRAM(s) Oral <User Schedule>  trimethoprim   80 mG/sulfamethoxazole 400 mG 1 Tablet(s) Oral daily  valGANciclovir 450 milliGRAM(s) Oral <User Schedule>    PRN Inpatient Medications  acetaminophen     Tablet .. 650 milliGRAM(s) Oral every 6 hours PRN  dextrose Oral Gel 15 Gram(s) Oral once PRN      REVIEW OF SYSTEMS  --------------------------------------------------------------------------------        All other systems were reviewed and are negative, except as noted.    VITALS/PHYSICAL EXAM  --------------------------------------------------------------------------------  T(C): 36.8 (05-11-22 @ 08:57), Max: 36.8 (05-10-22 @ 14:40)  HR: 66 (05-11-22 @ 08:57) (58 - 70)  BP: 176/68 (05-11-22 @ 08:57) (126/58 - 177/78)  RR: 20 (05-11-22 @ 08:57) (17 - 20)  SpO2: 98% (05-11-22 @ 08:57) (98% - 100%)  Wt(kg): --        05-10-22 @ 07:01  -  05-11-22 @ 07:00  --------------------------------------------------------  IN: 1080 mL / OUT: 1375 mL / NET: -295 mL    05-11-22 @ 07:01  -  05-11-22 @ 11:59  --------------------------------------------------------  IN: 240 mL / OUT: 750 mL / NET: -510 mL      Physical Exam:  	Gen: NAD  	HEENT: supple neck  	Pulm: CTA B/L  	CV: RRR, S1S2; no rub  	Abd: +BS, soft, nontender/nondistended,  RLQ wound c/d/i, + ALINA serosanguinous drainage  	Ext: no edema b/l  	Neuro: No focal deficits  	Psych: Normal affect and mood  	Skin: Warm, without rashes, stage 2 decub              Vascular: Left IJ tunneled dialysis catheter           LABS/STUDIES  --------------------------------------------------------------------------------              8.9    4.83  >-----------<  210      [05-11-22 @ 06:36]              27.7     138  |  102  |  71  ----------------------------<  99      [05-11-22 @ 06:34]  5.0   |  22  |  2.86        Ca     8.7     [05-11-22 @ 06:34]      Mg     1.7     [05-11-22 @ 06:34]      Phos  3.2     [05-11-22 @ 06:34]    TPro  6.2  /  Alb  2.9  /  TBili  0.4  /  DBili  x   /  AST  15  /  ALT  5   /  AlkPhos  80  [05-11-22 @ 06:34]    PT/INR: PT 14.5 , INR 1.25       [05-10-22 @ 04:57]  PTT: 31.9       [05-10-22 @ 04:57]      Creatinine Trend:  SCr 2.86 [05-11 @ 06:34]  SCr 2.91 [05-10 @ 04:57]  SCr 3.00 [05-09 @ 06:12]  SCr 3.21 [05-08 @ 07:10]  SCr 3.51 [05-07 @ 06:45]    Tacrolimus (), Serum: 6.0 ng/mL (05-10 @ 04:57)  Tacrolimus (), Serum: 4.0 ng/mL (05-09 @ 06:11)  Tacrolimus (), Serum: 4.4 ng/mL (05-08 @ 07:16)  Tacrolimus (), Serum: 8.6 ng/mL (05-07 @ 06:48)                Iron 17, TIBC 171, %sat 10      [05-02-22 @ 13:03]  Ferritin 1505      [05-02-22 @ 13:05]  PTH -- (Ca 9.2)      [02-05-22 @ 10:13]   294  HbA1c 6.0      [02-21-20 @ 08:53]  Lipid: chol 118, TG 54, HDL 59, LDL --      [06-27-21 @ 09:44]    HBsAb 51117.2      [04-21-22 @ 14:13]  HBsAg Nonreact      [04-21-22 @ 14:13]  HBcAb Reactive      [04-21-22 @ 14:13]  HCV 0.25, Nonreact      [04-21-22 @ 14:13]  HIV Nonreact      [04-21-22 @ 14:47]

## 2022-05-11 NOTE — DISCHARGE NOTE NURSING/CASE MANAGEMENT/SOCIAL WORK - PATIENT PORTAL LINK FT
You can access the FollowMyHealth Patient Portal offered by [facility] by registering at the following website: [email or web address]. By joining [employer]’s FollowMyHealth portal, you will also be able to view your health information using other applications (apps) compatible with our system.

## 2022-05-11 NOTE — PROGRESS NOTE ADULT - REASON FOR ADMISSION
Weakness
perinephric hematoma
Weakness

## 2022-05-11 NOTE — PROGRESS NOTE ADULT - PROBLEM SELECTOR PLAN 3
Anemia of blood loss & chronic disease  CT A/P revealed a large hematoma. s/p hematoma evacuation and repair of arterial anastomosis on 5/2.  monitor Hb 8.9 today. On Epo 10K weekly. Keep Hg ~10 due to hx of CAD. s/p 1 PRBC. Eliquis 2.5 mg PO bid resumed      Upon discharge please make appointment with Nephrology clinic. For scheduling please email Nephrology at [email or web address]    If you have any questions, please feel free to contact me  Steffi Cali  Nephrology Fellow  Pager NS: [phone]/ [facility]: [phone]    (After 5 pm or on weekends please page the on-call fellow, can check [email or web address] for schedule. Login is jesus [email or web address], schedule under [facility] medicine, psych, derm)
Recent hx of breakthrough seizures. Continue Depakote and Keppra.
Anemia of blood loss & chronic disease  CT A/P revealed a large hematoma. s/p hematoma evacuation and repair of arterial anastomosis on 5/2.  monitor Hb 7.9 today. On Epo 10K weekly. Keep Hg ~10 due to hx of CAD. Transfuse 1 PRBC. Eliquis on hold for perm cath removal      If you have any questions, please feel free to contact me  Steffi Cali  Nephrology Fellow  Pager NS: [phone]/ [facility]: [phone]    (After 5 pm or on weekends please page the on-call fellow, can check [email or web address] for schedule. Login is [email or web address], schedule under [facility] medicine, psych, derm)

## 2022-05-11 NOTE — PROGRESS NOTE ADULT - SUBJECTIVE AND OBJECTIVE BOX
Transplant Surgery - Multidisciplinary Rounds  --------------------------------------------------------------  R DCD DDRT  4/21/22    Pt seen and examined with multidisciplinary team:  Surgeon: Dr. David, Nephrologist: Dr. Alfred, Pharmacist: Carina, PGY3 TAMMY Martines, unit RN. Rest of disciplines not in attendance to be notified of plan    66M with history of HTN, HLD, DM (on insulin), ESRD (2/2 DM and HTN) on HD via L PermCath MWF, AFib (on Eliquis 2.5mg BID), CVA (2019 2/2 afib), focal seizure (on depakote, keppra), CAD with stents, CABG (2020- was on Plavix), hx of nonbleeding gastric/duodenal ulcer (EGD 2/9/22, on protonix)    s/p R DCD DDRT (1A/1V/1U stented) on 4/21/2022 with Thymoglobulin Induction   - Donor: 56 y/o M, KDPI 82%, PHS high risk, terminal Cr 1.34, CMV pos, EBV pos, HCV OPAL neg)   - Graft: post op renal doppler with  good perfusion; course c/b delayed graft function requiring HD inpatient and on discharge.  - AFib: Eliquis was resumed post-op, now on hold; Plavix has been on hold post-op    -Readmitted on 4/28 with weakness, anemia  -s/p OR evacuation of hematoma and repair of bleeding by arterial anastomosis, graft bx on 5/2    Interval Events:  -POD#9  - Scr 2.86 from 2.91    UOP 1.3L   Last HD on 4/29  - 1uPRBC   - Eliquis held. DDAVP x1. Permacath removed by IR  - generalized weakness improving, though OOB with heavy assist    Immunosuppression:  -Envarsus per level, MMF 1/1, Pred 5  -ongoing monitoring for signs of rejection    Potential Discharge date: today    Education:  Medications    Plan of care:  See Below      MEDICATIONS  (STANDING):  apixaban 2.5 milliGRAM(s) Oral two times a day  atorvastatin 40 milliGRAM(s) Oral at bedtime  carvedilol 25 milliGRAM(s) Oral every 12 hours  chlorhexidine 2% Cloths 1 Application(s) Topical daily  dextrose 5%. 1000 milliLiter(s) (50 mL/Hr) IV Continuous <Continuous>  dextrose 5%. 1000 milliLiter(s) (100 mL/Hr) IV Continuous <Continuous>  dextrose 50% Injectable 25 Gram(s) IV Push once  dextrose 50% Injectable 12.5 Gram(s) IV Push once  dextrose 50% Injectable 25 Gram(s) IV Push once  diVALproex  milliGRAM(s) Oral <User Schedule>  diVALproex  milliGRAM(s) Oral <User Schedule>  epoetin cortney-epbx (RETACRIT) Injectable 46342 Unit(s) SubCutaneous every 7 days  fluconAZOLE   Tablet 200 milliGRAM(s) Oral daily  glucagon  Injectable 1 milliGRAM(s) IntraMuscular once  insulin glargine Injectable (LANTUS) 6 Unit(s) SubCutaneous at bedtime  insulin lispro (ADMELOG) corrective regimen sliding scale   SubCutaneous three times a day before meals  insulin lispro (ADMELOG) corrective regimen sliding scale   SubCutaneous at bedtime  insulin lispro Injectable (ADMELOG) 4 Unit(s) SubCutaneous three times a day before meals  levETIRAcetam 250 milliGRAM(s) Oral every 12 hours  levothyroxine 50 MICROGram(s) Oral daily  magnesium oxide 400 milliGRAM(s) Oral three times a day with meals  mycophenolate mofetil 1000 milliGRAM(s) Oral <User Schedule>  pantoprazole    Tablet 40 milliGRAM(s) Oral before breakfast  polyethylene glycol 3350 17 Gram(s) Oral daily  predniSONE   Tablet 5 milliGRAM(s) Oral daily  senna 2 Tablet(s) Oral at bedtime  tacrolimus ER Tablet (ENVARSUS XR) 4 milliGRAM(s) Oral <User Schedule>  trimethoprim   80 mG/sulfamethoxazole 400 mG 1 Tablet(s) Oral daily  valGANciclovir 450 milliGRAM(s) Oral <User Schedule>    MEDICATIONS  (PRN):  acetaminophen     Tablet .. 650 milliGRAM(s) Oral every 6 hours PRN Temp greater or equal to 38C (100.4F), Mild Pain (1 - 3)  dextrose Oral Gel 15 Gram(s) Oral once PRN Blood Glucose LESS THAN 70 milliGRAM(s)/deciliter      PAST MEDICAL & SURGICAL HISTORY:  Hypertension      Diabetes      Dyslipidemia      CAD (Coronary Artery Disease)  with Stents in 06/2009 and 6/2019, s/p off-pump C3L on 8/13/20      Hypothyroidism      CVA (cerebral vascular accident)  12/13/19 with residual bilateral weakness      Anemia      PEG (percutaneous endoscopic gastrostomy) status  removed July 2020      Intubation of airway performed without difficulty  Dec 2019  CVA c/b status epilepticus requiring intubation and PEG in 12/2019-      ESRD on dialysis  M/W/F      Seizures  after CVA, last seizure was last week 4/07/22      History of insertion of stent into coronary artery bypass graft  2009 and 2019      S/P CABG x 3  off pump C3L on 8/13/20          Vital Signs Last 24 Hrs  T(C): 36.9 (11 May 2022 12:28), Max: 36.9 (11 May 2022 12:28)  T(F): 98.5 (11 May 2022 12:28), Max: 98.5 (11 May 2022 12:28)  HR: 60 (11 May 2022 12:28) (60 - 70)  BP: 159/72 (11 May 2022 12:28) (131/51 - 177/78)  BP(mean): 114 (11 May 2022 05:20) (75 - 114)  RR: 18 (11 May 2022 12:28) (17 - 20)  SpO2: 99% (11 May 2022 12:28) (98% - 100%)    I&O's Summary    10 May 2022 07:01  -  11 May 2022 07:00  --------------------------------------------------------  IN: 1080 mL / OUT: 1375 mL / NET: -295 mL    11 May 2022 07:01  -  11 May 2022 12:33  --------------------------------------------------------  IN: 480 mL / OUT: 750 mL / NET: -270 mL                              8.9    4.83  )-----------( 210      ( 11 May 2022 06:36 )             27.7     05-11    138  |  102  |  71<H>  ----------------------------<  99  5.0   |  22  |  2.86<H>    Ca    8.7      11 May 2022 06:34  Phos  3.2     05-11  Mg     1.7     05-11    TPro  6.2  /  Alb  2.9<L>  /  TBili  0.4  /  DBili  x   /  AST  15  /  ALT  5<L>  /  AlkPhos  80  05-11    Tacrolimus (), Serum: 6.0 ng/mL (05-10 @ 04:57)          Review of Systems: Gen: No weight changes, fatigue, fevers/chills, +weakness  Skin: No rashes  Head/Eyes/Ears/Mouth: No headache; Normal hearing; Normal vision w/o blurriness; No sinus pain/discomfort, sore throat  Respiratory: No dyspnea, cough, wheezing, hemoptysis  CV: No chest pain, PND, orthopnea  GI: mild abdominal pain, no diarrhea, constipation, nausea, vomiting, melena, hematochezia  : No increased frequency, dysuria, hematuria, nocturia  MSK: No joint pain/swelling; no back pain; no edema  Neuro: No dizziness/lightheadedness, weakness, seizures, numbness, tingling  Heme: No easy bruising or bleeding  Endo: No heat/cold intolerance  Psych: No significant nervousness, anxiety, stress, depression  All other systems were reviewed and are negative, except as noted    Physical Exam:   Constitutional: Well developed / well nourished  Eyes: Anicteric, PERRLA  ENMT: nc/at, no thrush  Neck: Supple  Respiratory: CTA B/L  Cardiovascular: RRR  Gastrointestinal: Soft abdomen, appropriate incisional TTP, minimal.  incision c/d/i.   Genitourinary:  good UO  Extremities: SCD's in place and working bilaterally. edema resolved. R midline c/d/i.  Vascular: Palpable dp pulses bilaterally  Neurological: A&O x3  Musculoskeletal: Moving all extremities, generalized weakness  Psychiatric: Responsive

## 2022-05-11 NOTE — PROGRESS NOTE ADULT - TIME BILLING
s/p DCD DDRT with DGF  re-admitted with acute blood loss anemia and weakness.  received 1u RBC on HD Friday with improvement. Eliquis held, plavix previously held post-op  Cont current immunosuppression with Envarsus by level with goal ~10, prednisone 5mg daily, Cellcept 1gm bid.   Will check tacrolimus level and adjust dose accordingly.  He received thymoglobulin induction  Transplant Prophylaxis with nystatin, bactrim, valcyte  GI prophylaxis due to steroids  monitor glucose levels and adjust insulin as necessary  HTN cont amlodipine, coreg  monitor hct  PT/OT
s/p DCD DDRT with DGF  re-admitted with acute blood loss anemia and weakness.  received 1u RBC on HD yesterday with improvement. Eliquis held, plavix previously held post-op  Cont current immunosuppression with Envarsus by level with goal ~10, prednisone 5mg daily, Cellcept 1gm bid.   Will check tacrolimus level and adjust dose accordingly.  He received thymoglobulin induction  Transplant Prophylaxis with nystatin, bactrim, valcyte  GI prophylaxis due to steroids  monitor glucose levels and adjust insulin as necessary  HTN cont amlodipine, increase coreg to 6.25  monitor hct
Kidney Transplant recipient with delayed functioning allograft, now off dialysis  ANA, gradually improving creatinine, non oliguric, returned from home due to ADL requiring higher level of support and assistance as well as low hemoglobin, hematoma, severe anemia s/p PRBC, s/p reexploration, evacuation of hematoma and repair of anastomosis  Creatinine trend / electrolytes noted  Comorbidities reviewed. Seizure, thymo induction h/o CVA, DM, HTN  Reviewed available clinical and lab as well as imaging  data including history,  progress notes and consult notes.  Reviewed immunosuppression and allograft function    Reviewed medication regimen for  blood pressure control , infection prophylaxis and glycemic control  Suggestions:  Tacrolimus target level 8-10 ng/ml  Will monitor for allograft function recovery, monitor Hb/Hct  Discharge planning to subaute rehab if remains stable  D/w transplant team  I was present during and reviewed clinical and lab data as well as assessment and plan as documented. Please contact if any additional questions with any change in clinical condition or on availability of any additional information or reports.
s/p DCD DDRT with DGF  re-admitted with acute blood loss anemia and weakness.  received 1u RBC with improvement. Eliquis held, plavix previously held post-op  plan for HD today and transfuse 1u RBC  Cont current immunosuppression with Envarsus by level with goal ~10, prednisone 5mg daily, Cellcept 1gm bid.   Will check tacrolimus level and adjust dose accordingly.  He received thymoglobulin induction  Transplant Prophylaxis with nystatin, bactrim, valcyte  GI prophylaxis due to steroids  monitor glucose levels and adjust sliding scare as necessary
Kidney Transplant recipient with delayed functioning allograft, now off dialysis  ANA, gradually improving creatinine, non oliguric, returned from home due to ADL requiring higher level of support and assistance as well as low hemoglobin, hematoma, severe anemia s/p PRBC, s/p reexploration, evacuation of hematoma and repair of anastomosis  Creatinine trend / electrolytes noted  Comorbidities reviewed. Seizure, thymo induction h/o CVA, DM, HTN  Reviewed available clinical and lab as well as imaging  data including history,  progress notes and consult notes.  Reviewed immunosuppression and allograft function    Reviewed medication regimen for  blood pressure control , infection prophylaxis and glycemic control  Suggestions:  Tacrolimus target level 8-10 ng/ml  PRBC transfusion  Will monitor for allograft function recovery, monitor Hb/Hct  Tunneled catheter removal by I/R  Discharge planning to sub acute rehab if remains stable  D/w transplant team  I was present during and reviewed clinical and lab data as well as assessment and plan as documented. Please contact if any additional questions with any change in clinical condition or on availability of any additional information or reports.
Kidney Transplant recipient with delayed functioning allograft,  off dialysis  ANA,   improved creatinine, non oliguric,  s/p reexploration, evacuation of hematoma and repair of anastomosis  Creatinine trend / electrolytes noted  s/p tunneled catheter removal  Comorbidities reviewed. Seizure, thymo induction h/o CVA, DM, HTN  Reviewed available clinical and lab as well as imaging  data   Reviewed immunosuppression and allograft function    Reviewed medication regimen for  blood pressure control , infection prophylaxis and glycemic control  Suggestions:  Continue current immunosuppression  Discharge planning to sub acute rehab    D/w transplant team  I was present during and reviewed clinical and lab data as well as assessment and plan as documented. Please contact if any additional questions with any change in clinical condition or on availability of any additional information or reports.
Kidney Transplant recipient with delayed functioning allograft  ANA, gradually improving urine output, returned from home due to ADL requiring higher level of support and assistance as well as low hemoglobin, hematoma, severe anemia s/p PRBC  Creatinine trend / electrolytes noted  Comorbidities reviewed. Seizure, thymo induction h/o CVA, DM, HTN  Reviewed available clinical and lab as well as imaging  data including history,  progress notes and consult notes.  Reviewed immunosuppression and allograft function    Reviewed medication regimen for  blood pressure control , infection prophylaxis and glycemic control  Suggestions:  Tacrolimus target level 8-10 ng/ml  Will monitor for allograft function recovery with hemodialysis support as needed  PRBC transfusion  I have seen the patient and reviewed dialysis prescription  . Dialysis access is functioning well.  Dialysis prescription has been adjusted for optimized control of volume status, uremia and electrolytes. Management of additional metabolic abnormalities/anemia will continue to be addressed on follow up.  D/w transplant team  I was present during and reviewed clinical and lab data as well as assessment and plan as documented. Please contact if any additional questions with any change in clinical condition or on availability of any additional information or reports.

## 2022-05-11 NOTE — PATIENT PROFILE ADULT - PRO INTERPRETER NEED 2
Patient is having increased lower back and knee pain. Reports 85-90% relief from her last injection in October. She would like repeat caudal and SI injections and is also wanting knee injections with PRP. A fuv was scheduled 2/7, but patient states she can not wait that long. Please advise.   English

## 2022-05-11 NOTE — PROGRESS NOTE ADULT - PROBLEM SELECTOR PLAN 2
-Titrate coreg to 25 mg Po q12  -Monitor BP closely
-Titrate coreg to 25 mg Po q12  -Monitor BP closely
Immunosuppression need in kidney recipient.  s/p Thymo induction.   On Envarsus adjust as per levels. target goal 8-10.  Continue Cellcept 1 gm BID.   Prednisone taper.   Infection prophy - Nystatin, bactrim, Valcyte.

## 2022-05-11 NOTE — PROGRESS NOTE ADULT - SUBJECTIVE AND OBJECTIVE BOX
[facility] [place] [facility]   MD NINA MASON MD RUORU WONG, PA    TEL:  FROM 9 AM to 5 PM ---OFFICE: [phone]    FROM 5 PM - 9 AM PLEASE CALL ANSWERING SERVICE: [phone]    RENAL FOLLOW UP NOTE--Date of Service 05-11-22 @ 09:20  --------------------------------------------------------------------------------  HPI:      Pt seen and examined at bedside.   Keeganies SOB, chest pain     PAST HISTORY  --------------------------------------------------------------------------------  No significant changes to PMH, PSH, FHx, SHx, unless otherwise noted    ALLERGIES & MEDICATIONS  --------------------------------------------------------------------------------  Allergies    No Known Allergies    Intolerances      Standing Inpatient Medications  atorvastatin 40 milliGRAM(s) Oral at bedtime  carvedilol 25 milliGRAM(s) Oral every 12 hours  chlorhexidine 2% Cloths 1 Application(s) Topical daily  dextrose 5%. 1000 milliLiter(s) IV Continuous <Continuous>  dextrose 5%. 1000 milliLiter(s) IV Continuous <Continuous>  dextrose 50% Injectable 25 Gram(s) IV Push once  dextrose 50% Injectable 12.5 Gram(s) IV Push once  dextrose 50% Injectable 25 Gram(s) IV Push once  diVALproex  milliGRAM(s) Oral <User Schedule>  diVALproex  milliGRAM(s) Oral <User Schedule>  epoetin cortney-epbx (RETACRIT) Injectable 95777 Unit(s) SubCutaneous every 7 days  fluconAZOLE   Tablet 200 milliGRAM(s) Oral daily  glucagon  Injectable 1 milliGRAM(s) IntraMuscular once  insulin glargine Injectable (LANTUS) 6 Unit(s) SubCutaneous at bedtime  insulin lispro (ADMELOG) corrective regimen sliding scale   SubCutaneous three times a day before meals  insulin lispro (ADMELOG) corrective regimen sliding scale   SubCutaneous at bedtime  insulin lispro Injectable (ADMELOG) 4 Unit(s) SubCutaneous three times a day before meals  levETIRAcetam 250 milliGRAM(s) Oral every 12 hours  levothyroxine 50 MICROGram(s) Oral daily  magnesium oxide 400 milliGRAM(s) Oral three times a day with meals  mycophenolate mofetil 1000 milliGRAM(s) Oral <User Schedule>  pantoprazole    Tablet 40 milliGRAM(s) Oral before breakfast  polyethylene glycol 3350 17 Gram(s) Oral daily  predniSONE   Tablet 5 milliGRAM(s) Oral daily  senna 2 Tablet(s) Oral at bedtime  tacrolimus ER Tablet (ENVARSUS XR) 4 milliGRAM(s) Oral <User Schedule>  trimethoprim   80 mG/sulfamethoxazole 400 mG 1 Tablet(s) Oral daily  valGANciclovir 450 milliGRAM(s) Oral <User Schedule>    PRN Inpatient Medications  acetaminophen     Tablet .. 650 milliGRAM(s) Oral every 6 hours PRN  dextrose Oral Gel 15 Gram(s) Oral once PRN      REVIEW OF SYSTEMS  --------------------------------------------------------------------------------  General: no fever  CVS: no chest pain  RESP: no sob, no cough  ABD: no abdominal pain  : no dysuria,  MSK: no edema     VITALS/PHYSICAL EXAM  --------------------------------------------------------------------------------  T(C): 36.8 (05-11-22 @ 08:57), Max: 36.8 (05-10-22 @ 14:40)  HR: 66 (05-11-22 @ 08:57) (56 - 70)  BP: 176/68 (05-11-22 @ 08:57) (126/58 - 177/78)  RR: 20 (05-11-22 @ 08:57) (17 - 20)  SpO2: 98% (05-11-22 @ 08:57) (98% - 100%)  Wt(kg): --        05-10-22 @ 07:01  -  05-11-22 @ 07:00  --------------------------------------------------------  IN: 1080 mL / OUT: 1375 mL / NET: -295 mL    05-11-22 @ 07:01  -  05-11-22 @ 09:20  --------------------------------------------------------  IN: 0 mL / OUT: 425 mL / NET: -425 mL      Physical Exam:  	Gen: NAD  	HEENT: MMM  	Pulm: CTA B/L  	CV: S1S2  	Abd: Soft, +BS  	Ext: No LE edema B/L                      Neuro: Awake   	Skin: Warm and Dry   	Vascular access: NO HD catheter            no  garrett  LABS/STUDIES  --------------------------------------------------------------------------------              8.9    4.83  >-----------<  210      [05-11-22 @ 06:36]              27.7     138  |  102  |  71  ----------------------------<  99      [05-11-22 @ 06:34]  5.0   |  22  |  2.86        Ca     8.7     [05-11-22 @ 06:34]      Mg     1.7     [05-11-22 @ 06:34]      Phos  3.2     [05-11-22 @ 06:34]    TPro  6.2  /  Alb  2.9  /  TBili  0.4  /  DBili  x   /  AST  15  /  ALT  5   /  AlkPhos  80  [05-11-22 @ 06:34]    PT/INR: PT 14.5 , INR 1.25       [05-10-22 @ 04:57]  PTT: 31.9       [05-10-22 @ 04:57]      Creatinine Trend:  SCr 2.86 [05-11 @ 06:34]  SCr 2.91 [05-10 @ 04:57]  SCr 3.00 [05-09 @ 06:12]  SCr 3.21 [05-08 @ 07:10]  SCr 3.51 [05-07 @ 06:45]        Iron 17, TIBC 171, %sat 10      [05-02-22 @ 13:03]  Ferritin 1505      [05-02-22 @ 13:05]  PTH -- (Ca 9.2)      [02-05-22 @ 10:13]   294  HbA1c 6.0      [02-21-20 @ 08:53]  Lipid: chol 118, TG 54, HDL 59, LDL --      [06-27-21 @ 09:44]    HBsAb 60729.2      [04-21-22 @ 14:13]  HBsAg Nonreact      [04-21-22 @ 14:13]  HBcAb Reactive      [04-21-22 @ 14:13]  HCV 0.25, Nonreact      [04-21-22 @ 14:13]  HIV Nonreact      [04-21-22 @ 14:47]

## 2022-05-11 NOTE — DISCHARGE NOTE NURSING/CASE MANAGEMENT/SOCIAL WORK - NSDCPEFALRISK_GEN_ALL_CORE
For information on Fall & Injury Prevention, visit: [email or web address] OR  [email or web address] OR  https://www.cdc.gov/steadi/patient.html

## 2022-05-16 NOTE — HISTORY OF PRESENT ILLNESS
[FreeTextEntry4] : Presents for an immunosuppression management follow up encounter after kidney transplantation.\par Underwent a  donor kidney transplant on 2022.\par Patient's son also present.\par \par IMMUNOUSPPRESSION:\par ENVARSUS: 5 mg daily\par CELLCEPT: 1 gram every 12 hours\par PREDNISONE: 5 mg daily

## 2022-05-16 NOTE — PHYSICAL EXAM
[Well Developed] : well developed [No Acute Distress] : no acute distress [Normocephalic] : normocephalic [Atraumatic] : atraumatic [Sclera Anicteric] : sclera anicteric [Neck Supple] : neck supple [Clear to Auscultation] : clear to auscultation [Breathing Comfortably on RA] : breathing comfortably on room air [Normal Rate] : normal rate [Regular Rhythm] : regular rhythm [Murmur] : murmur [Systolic Murmur] : systolic murmur [Soft] : soft [Non-tender] : non-tender [Scars] : scars [] : right dorsalis pedis palpable [Alert] : alert [Responds to Questions Appropriately] : responds to questions appropriately [Oriented] : oriented [Appropriate] : appropriate [TextBox_3] : Thin.   [FreeTextEntry1] : No thrush.  no carotid bruits. [TextBox_34] : No ulcers or edema [TextBox_86] : No adenopathy [Clean] : clean [Dry] : dry [Healing Well] : healing well [Bleeding] : no active bleeding [Foul Odor] : no foul smell [Purulent Drainage] : no purulent drainage [Serosanguinous Drainage] : no serosanguinous drainage [Erythema] : not erythematous [Warm] : not warm [Tender] : not tender

## 2022-05-16 NOTE — ASSESSMENT
[FreeTextEntry1] : IMMUNOUSPPRESSION:\par ENVARSUS: 5 mg daily\par CELLCEPT: 1 gram every 12 hours\par PREDNISONE: 5 mg daily\par \par Creatinine level: 2.23\par Envarsus level: 12.7\par Potassium level: 6.3\par \par We contacted [facility] with the following plan/orders:\par - lokelma and lasix 40 mg IV to address hyperkalemia\par - repeat potassium level later today\par - hold envarsus dose today and repeat level tomorrow

## 2022-05-23 NOTE — REASON FOR VISIT
[Follow-Up] : a follow-up visit [FreeTextEntry1] : Post kidney transplant follow up- currently at [facility]

## 2022-05-23 NOTE — HISTORY OF PRESENT ILLNESS
[FreeTextEntry1] : Patient is here for post discharge follow up. He was readmitted for post op hematoma and for exploration. Details of his admission and discharge summary as well as details of transplant reviewed and noted below. I spoke to patient's son,  Chayito Castelan who is accompanying him today. Patient had RVP pos at [facility] and was in resp isolation with only dry cough as symptom. He has been otherwise participating in exercise activities.\par \par Reports he has good appetite, no pain. Staples still +. Has urinary incontinence. Has normal bowel movements. Blood pressure has been fairly controlled and glucose controlled while at [facility].\par \par He is ambulating with walker while at [facility] and came to Clinic with Wheel chair assistance.\par \par  He is only on Carvedilol 6.25 mg twice daily.\par \par \par Medications from [facility] rehab reviewed, and gave for scanning to his records.\par \par He is on Tac 3 mg /d, MMF 1000 twice daily and prednisone 5 mg/d.\par \par \par His primary nephrologist Dr. Rye/Dr. Martinez has not seen him since discharge.\par \par \par \par \par \par \par Hospital Course: \par Discharge Date	10-May-2022 \par Admission Date	28-Apr-2022 13:45 \par Reason for Admission	Weakness \par Hospital Course	 \par 66M with history of HTN, HLD, DM (on insulin), ESRD (2/2 DM and HTN) on HD via \par L permacath, Afib (on Eliquis 2.5mg BID), hx of stroke (2019 2/2 afib), focal \par seizure (on depakote, keppra), CABG (2020), nonbleeding gastric/duodenal ulcer \par (EGD 2/9/22, on protonix) \par \par s/p R DCD DDRT (1A/1V/1U stented) on 4/21/2022 with Thymoglobulin Induction \par - Donor: 58 y/o M, KDPI 82%, PHS high risk, terminal Cr 1.34, CMV pos, EBV pos, \par HCV OPAL neg) \par - Graft: post op renal doppler with  good perfusion; course c/b delayed graft \par function requiring HD inpatient and on discharge. \par - AFib: resumed Eliquis; Plavix on HOLD \par \par -Readmitted on 4/28 with weakness, anemia requiring intermittent PRBC/Epogen. \par Did not have signs of shock \par -Imaging revealed perinephric hematoma-->s/p OR evacuation of hematoma and \par repair of bleeding by arterial anastomosis, graft bx on 5/2 with overall \par improvement in symptoms \par -Graft: last HD on 4/29. Improved UO and Cr this admission on Lasix trials (now \par off).  D/C Cr was 2.86.  L PermCath was removed by IR on 5/10 \par -intra-op graft bx negative \par -post-op ALINA was removed. \par -received Zosyn for post-op PPx with negative OR cultures.  Fluc was continued \par for 1 month PPx \par -post-op debility, worked with PT-->[facility] \par \par Evaluated daily by our multidisciplinary transplant team of surgeons, \par nephrologists, pharmacists, ACPs, SW, RNs, Nutrition and deemed safe for d/c to \par [facility] with the following plan: \par -ENV 5mg qd, MMF 1/1, Pred 5 \par -Eliquis 2.5BID. Plavix no longer required \par -f/u with Dr. Barber on Monday 5/16 \par \par Med Reconciliation: \par Override IMPROVE-DD recommendations due to:	This is a surgical and/or \par non-medical patient. \par Recommended Post-Discharge VTE Prophylaxis	This is a surgical and/or \par non-medical patient. \par Medication Reconciliation Status	Admission Reconciliation is Completed \par Discharge Reconciliation is Completed \par Discharge Medications	apixaban 2.5 mg oral tablet: 1 tab(s) orally 2 times a \par day \par atorvastatin 40 mg oral tablet: 1 tab(s) orally once a day (at bedtime) \par CellCept 500 mg oral tablet: 2 tab(s) orally 2 times a day \par Coreg 25 mg oral tablet: 1 tab(s) orally every 12 hours \par divalproex sodium 250 mg oral delayed release tablet: 1 tab(s) orally once a \par day \par divalproex sodium 500 mg oral delayed release tablet: 1 tab(s) orally 2 times a \par day \par fluconazole 200 mg oral tablet: 1 tab(s) orally once a day \par HumaLOG 100 units/mL injectable solution: 4 unit(s) injectable 3 times a day \par insulin glargine 100 units/mL subcutaneous solution: 6 unit(s) subcutaneous \par once a day (at bedtime) \par levETIRAcetam 250 mg oral tablet: 1 tab(s) orally 2 times a day \par levothyroxine 50 mcg (0.05 mg) oral tablet: 1 tab(s) orally once a day \par magnesium oxide 400 mg oral tablet: 1 tab(s) orally 3 times a day (with meals) \par pantoprazole 40 mg oral delayed release tablet: 1 tab(s) orally once a day \par (before a meal) \par predniSONE: 5 milligram(s) orally once a day \par senna oral tablet: 2 tab(s) orally once a day (at bedtime) \par sulfamethoxazole-trimethoprim 400 mg-80 mg oral tablet: 1 tab(s) orally once a \par day \par tacrolimus 4 mg oral tablet, extended release: 5 milligram(s) orally once a day \par valGANciclovir 450 mg oral tablet: 1 tab(s) orally 3 times a week \par

## 2022-05-23 NOTE — PHYSICAL EXAM
[Sclera] : the sclera and conjunctiva were normal [PERRL With Normal Accommodation] : pupils were equal in size, round, and reactive to light [Extraocular Movements] : extraocular movements were intact [Outer Ear] : the ears and nose were normal in appearance [Oropharynx] : the oropharynx was normal [Neck Appearance] : the appearance of the neck was normal [Neck Cervical Mass (___cm)] : no neck mass was observed [Jugular Venous Distention Increased] : there was no jugular-venous distention [Thyroid Diffuse Enlargement] : the thyroid was not enlarged [Thyroid Nodule] : there were no palpable thyroid nodules [Respiration, Rhythm And Depth] : normal respiratory rhythm and effort [Exaggerated Use Of Accessory Muscles For Inspiration] : no accessory muscle use [Auscultation Breath Sounds / Voice Sounds] : lungs were clear to auscultation bilaterally [Heart Rate And Rhythm] : heart rate was normal and rhythm regular [Heart Sounds Gallop] : no gallops [Heart Sounds Pericardial Friction Rub] : no pericardial rub [Edema] : there was no peripheral edema [Abdomen Soft] : soft [Abdomen Tenderness] : non-tender [Cervical Lymph Nodes Enlarged Posterior Bilaterally] : posterior cervical [Cervical Lymph Nodes Enlarged Anterior Bilaterally] : anterior cervical [Supraclavicular Lymph Nodes Enlarged Bilaterally] : supraclavicular [Axillary Lymph Nodes Enlarged Bilaterally] : axillary [Involuntary Movements] : no involuntary movements were seen [] : no rash [FreeTextEntry1] : Sitting in wheel chair, moves all 4 limbs; speech- slow. [Oriented To Time, Place, And Person] : oriented to person, place, and time [Impaired Insight] : insight and judgment were intact [Affect] : the affect was normal

## 2022-05-23 NOTE — ASSESSMENT
[FreeTextEntry1] : Renal Transplant recipient: Had delayed allograft function, elevated creatinine at discharge. Has urinary incontinence. No dysuria/hematuria. No fever/chills. Tolerating medications.\par Immunosuppression: reviewed; simulect induction, on tac/MMF/prednisone, last Tac level noted; will recheck. Currently on 3 mg  daily.; full dose MMF and prednisone at 5 mg daily\par DM: On Lantus 5 u and NovoLog 4 u pre meal.  \par Will continue to monitor and adjust treatment\par Hypertension: controlled; Reviewed medications.  \par Hyperlipidemia: On statin, continue the same.\par Infection prophylaxis: On Bactrim, Valcyte and nystatin as well as GI prophylaxis.\par He has met with transplant surgeon; post op wound care, staples removal and ureteral stent removal were discussed.\par Will have labs drawn at [facility], [facility] d/w [facility] rehab Physician Dr. Castro, sent my contact number to Dr. Castro.\par If K is eelevated will switch to Mepron

## 2022-06-01 NOTE — PHYSICAL EXAM
[Well Developed] : well developed [Well Nourished] : well nourished [Normocephalic] : normocephalic [Atraumatic] : atraumatic [Clear to Auscultation] : clear to auscultation [Breathing Comfortably on RA] : breathing comfortably on room air [Normal Rate] : normal rate [Regular Rhythm] : regular rhythm [Soft] : soft [Non-tender] : non-tender [Site: ___] : Site: [unfilled] [Clean] : clean [Dry] : dry [Healing Well] : healing well

## 2022-06-01 NOTE — PROGRESS NOTE ADULT - PROBLEM SELECTOR PLAN 5
After reviewing the patient's responses to the questionnaire and additional information in their electronic health record, I have determined that their illness cannot safely and effectively be addressed through a virtual visit. It requires a face-to-face evaluation by a Physician, Nurse Practitioner, or Physician Assistant.     The patient will be advised to seek face-to-face care at a local Urgent Care or Immediate Care Center for an expedited and thorough face-to-face evaluation and consideration for diagnostic testing for this medical condition.     Connected with mom   Pt has a \"quarter sized\" red, raised lump to back of head/neck  Mom noticed it today  It seems painful when touched  It is hard  Recommend face to face evaluation    BP elevated in the ED to 190s.   - c/w Coreg 6.25 mg BID and amlodipine 5mg QD  - if BP remains elevated, can consider 5mg IV hydralazine

## 2022-06-01 NOTE — ASSESSMENT
[FreeTextEntry1] : Graft: biopsied at takeback on 5/2. needs to be read by renal path. cr 2.4\par \par Immuno: envarsus 3, cellcept 1gm BID, pred 5mg \par \par UTI: finished abx course. needs repeat UA at [facility]. \par \par Afib: Eliquis.  \par \par CAD: sp bypas and stents remotely.  Plavix monotherapy rec by cards has been held.

## 2022-06-01 NOTE — HISTORY OF PRESENT ILLNESS
[de-identified] : at [facility] for rehab, making progress, can transfer from bed to wheelchair. \par \par has been off HD for 4wks.  Cr 2.4, was 2.2\par \par

## 2022-06-06 PROBLEM — N39.0 URINARY TRACT INFECTION, BACTERIAL: Status: RESOLVED | Noted: 2022-01-01 | Resolved: 2022-01-01

## 2022-06-06 NOTE — PHYSICAL EXAM
[General Appearance - Alert] : alert [General Appearance - In No Acute Distress] : in no acute distress [Sclera] : the sclera and conjunctiva were normal [PERRL With Normal Accommodation] : pupils were equal in size, round, and reactive to light [Extraocular Movements] : extraocular movements were intact [Outer Ear] : the ears and nose were normal in appearance [Oropharynx] : the oropharynx was normal [Neck Appearance] : the appearance of the neck was normal [Neck Cervical Mass (___cm)] : no neck mass was observed [Jugular Venous Distention Increased] : there was no jugular-venous distention [Thyroid Diffuse Enlargement] : the thyroid was not enlarged [Thyroid Nodule] : there were no palpable thyroid nodules [Respiration, Rhythm And Depth] : normal respiratory rhythm and effort [Exaggerated Use Of Accessory Muscles For Inspiration] : no accessory muscle use [Auscultation Breath Sounds / Voice Sounds] : lungs were clear to auscultation bilaterally [Heart Rate And Rhythm] : heart rate was normal and rhythm regular [Heart Sounds Gallop] : no gallops [Heart Sounds Pericardial Friction Rub] : no pericardial rub [Edema] : there was no peripheral edema [Abdomen Soft] : soft [Abdomen Tenderness] : non-tender [Cervical Lymph Nodes Enlarged Posterior Bilaterally] : posterior cervical [Cervical Lymph Nodes Enlarged Anterior Bilaterally] : anterior cervical [Supraclavicular Lymph Nodes Enlarged Bilaterally] : supraclavicular [Axillary Lymph Nodes Enlarged Bilaterally] : axillary [Involuntary Movements] : no involuntary movements were seen [] : no rash [Oriented To Time, Place, And Person] : oriented to person, place, and time [Impaired Insight] : insight and judgment were intact [Affect] : the affect was normal [FreeTextEntry1] : Sitting in wheel chair, moves all 4 limbs; speech- improved volume

## 2022-06-06 NOTE — ASSESSMENT
[FreeTextEntry1] : Renal Transplant recipient: Had delayed allograft function, elevated creatinine , stable. Has urinary incontinence. No dysuria/hematuria. No fever/chills. Tolerating medications.\par Need continued nutrition and physical tehrapy.\par Immunosuppression: reviewed; simulect induction, on tac/MMF/prednisone, last Tac level noted; will recheck. Currently on 3 mg  daily.; full dose MMF and prednisone at 5 mg daily\par UTI: On Levaquin. Will f/u [facility]. Advised repeat urine culture this week, if negative will arrange stent removal from transplant ureter. D/w Coordinator.\par Will decrease MMF to 500 mg/dose.\par DM: On insulin. Will continue to monitor and adjust treatment\par Hypertension: controlled; Reviewed medications.  \par Hyperlipidemia: On statin, continue the same.\par Infection prophylaxis: On Bactrim, Valcyte and nystatin as well as GI prophylaxis.\par He has met with transplant surgeon; post op wound care, staples removal and ureteral stent removal were discussed.\par Had labs drawn today,

## 2022-06-06 NOTE — REASON FOR VISIT
[Follow-Up] : a follow-up visit [FreeTextEntry1] : Post kidney transplant follow up- currently at [facility] rehab, on Levaquin for UTI

## 2022-06-10 NOTE — H&P ADULT - NSHPPHYSICALEXAM_GEN_ALL_CORE
General: WN/WD NAD  Neurology: Sedated and Intubated   Eyes: PERRLA, PERRLA   HEENT: Neck supple, trachea midline, No JVD,  Respiratory: CTA B/L, No wheezing, rales, rhonchi  CV: RRR, S1S2, no murmurs, rubs or gallops  Abdominal: Soft, NT, ND +BS  Extremities: No edema, + peripheral pulses  Skin: SKin tears     Lines/drains/airway: PIV ML General: WN/WD NAD  Neurology: Sedated and Intubated   Eyes: PERRLA, Pin Point Pupils   HEENT: Neck supple, trachea midline, No JVD,  Respiratory: CTA B/L, No wheezing, rales, rhonchi diminished right base   CV: RRR, S1S2, no murmurs, rubs or gallops  Abdominal: Soft, NT, ND +BS  Extremities: No edema, + peripheral pulses  Skin: SKin tears     Lines/drains/airway: PIV ML

## 2022-06-10 NOTE — H&P ADULT - CRITICAL CARE ATTENDING COMMENT
status epilepticus of unclear origin (patient known to have seizure disorder)  acute resp failure in the setting above  heart failure likely not in exacerbation  RLL pneumonia with pleural effusion    intubates, sedated on prop  reactive pupils  minimal vent settings  no edema  soft abdomen    Plan  propofol  aed as per neuro  veeg tonight  pending xray and abg  check procal if elevated start abx  if procal elevated will drain pleural effusion as well  consult transplant team for DDRT  wean propofol as per veeg  sepsis work up status epilepticus of unclear origin (patient known to have seizure disorder)  acute resp failure in the setting above  heart failure likely not in exacerbation  RLL pneumonia with pleural effusion    intubates, sedated on prop  reactive pupils  minimal vent settings  no edema  soft abdomen    Plan  propofol  aed as per neuro  veeg tonight  check procal  given immunocompromised will need to rule out parapnumonic effusion, so will drain riight pleural effusion  inr 1.5 is ok, ffp would only bring down to 1.4. plt ok, will proceed  consult transplant team for DDRT  wean propofol as per veeg  sepsis work up

## 2022-06-10 NOTE — ED PROVIDER NOTE - PROGRESS NOTE DETAILS
Jaden, PGY3: neuro paged awaiting call back Jaden, PGY3: neuro consulted for status epilepticus on propofol gtt. will evaluate pt in ED. pending ICU admission.

## 2022-06-10 NOTE — CHART NOTE - NSCHARTNOTEFT_GEN_A_CORE
: Yodit RAZA NP     INDICATION: Admission      PROCEDURE:  [x ] LIMITED ECHO  [x ] LIMITED CHEST  [ ] LIMITED RETROPERITONEAL  [x] LIMITED ABDOMINAL  [ ] LIMITED DVT  [ ] NEEDLE GUIDANCE VASCULAR  [ ] NEEDLE GUIDANCE THORACENTESIS  [ ] NEEDLE GUIDANCE PARACENTESIS  [ ] NEEDLE GUIDANCE PERICARDIOCENTESIS  [ ] OTHER    FINDINGS:  Echo   RV smaller than LV   LV wall thickness with decreased function   IVC 2.1 cm   No pericardial effusion noted  LA and RA enlargement     Chest   Some scattered B lines to bilateral anterior chest wall   Left Pleural base with small consolidation   Right Pleural Base with Moderated Pleural effusion (no stranding / or sedimentation noted) (+) consolidated lung     Abd   No Ascites noted   Bladder noted to be distended   Right Kidney small   Rt DDRT without any hydronephrosis of calculi   Left Kidney without hydronephrosis or calculi       INTERPRETATION:  Enlarged LA and RA   LV with moderate systolic disfunction   Scattered b lines   Distended Bladder   Moderate right Pleural effusion with consolidation : Yodit RAZA NP     INDICATION: Admission      PROCEDURE:  [x ] LIMITED ECHO  [x ] LIMITED CHEST  [ ] LIMITED RETROPERITONEAL  [x] LIMITED ABDOMINAL  [ ] LIMITED DVT  [ ] NEEDLE GUIDANCE VASCULAR  [ ] NEEDLE GUIDANCE THORACENTESIS  [ ] NEEDLE GUIDANCE PARACENTESIS  [ ] NEEDLE GUIDANCE PERICARDIOCENTESIS  [ ] OTHER    FINDINGS:  Echo   RV smaller than LV   LV wall thickness with decreased function   IVC 2.1 cm   No pericardial effusion noted  LA and RA enlargement     Chest   Some scattered B lines to bilateral anterior chest wall   Left Pleural base with small consolidation   Right Pleural Base with Moderated Pleural effusion no stranding but (+)  sedimentation noted) (+) consolidated lung     Abd   No Ascites noted   Bladder noted to be distended   Right Kidney small   Rt DDRT without any hydronephrosis of calculi   Left Kidney without hydronephrosis or calculi       INTERPRETATION:  Enlarged LA and RA   LV with moderate systolic disfunction   Scattered b lines   Distended Bladder   Moderate right Pleural effusion with consolidation  Rt DDRT without any hydronephrosis of calculi   Left Kidney without hydronephrosis or calculi   Right Kidney small

## 2022-06-10 NOTE — H&P ADULT - HISTORY OF PRESENT ILLNESS
66 yr old Male with PMHx ESRD s/p permacath removal 5/10/22 s/p Rt DDRT 4/21/22,  CVA (2019), Afib on apixaban, seizure activity, CAD s/p stents, CABG (2020), HTN, HLD, DM on insulin, gastric/duodenal ulcer, recent hospitalization 4/28/22 -5/10/22 with weakness/anemia found to have perinephric hematoma requiring evacuation and repair of bleeding arterial anastomosis. Pt with eventual d/c to [facility] from where he presents today after developing multiple sz episodes refractory to 5 mg versed IM (EMS) and 2 mg ativan IV in E.D. concerning for status epilepticus requiring intubation for hypoxic resp failure  Consult called for hypoxic resp failure secondary to status epilepticus 66 yr old Male with PMHx ESRD s/p permacath removal 5/10/22 s/p Rt DDRT 4/21/22,  CVA (2019), Afib on apixaban, seizure activity, CAD s/p stents, CABG (2020), HTN, HLD, DM on insulin, gastric/duodenal ulcer, recent hospitalization 4/28/22 -5/10/22 with weakness/anemia found to have perinephric hematoma requiring evacuation and repair of bleeding arterial anastomosis. Curently being treated for UTI with Levauin  Today after developing multiple sz episodes refractory to 5 mg versed IM (EMS) and 2 mg ativan IV in E.D. concerning for status epilepticus requiring intubation for hypoxic respiratory  failure. MICU Consult was called for hypoxic respiratory failure secondary to status epilepticus.     In the ED VS temp 97.8 Axillary, Hrt rate 61 RR26 100% on NRB mask   Per ED noted the patient was noted to be gurgling and was unable to protect airway, and was intubated. The patient has now been accepted to the MICU for further management   66 yr old Male with PMHx ESRD s/p permacath removal 5/10/22 s/p Rt DDRT 4/21/22,  CVA (2019), Afib on apixaban, seizure activity, CAD s/p stents, CABG (2020), HTN, HLD, DM on insulin, gastric/duodenal ulcer, recent hospitalization 4/28/22 -5/10/22 with weakness/anemia found to have perinephric hematoma requiring evacuation and repair of bleeding arterial anastomosis. Curently being treated for UTI with Levaquin Today after developing multiple sz episodes refractory to 5 mg versed IM (EMS) and 2 mg ativan IV in E.D. concerning for status epilepticus requiring intubation for hypoxic respiratory  failure. MICU Consult was called for hypoxic respiratory failure secondary to status epilepticus.     In the ED VS temp 97.8 Axillary, Hrt rate 61 RR26 100% on NRB mask   Per ED noted the patient was noted to be gurgling and was unable to protect airway, and was intubated. The patient has now been accepted to the MICU for further management

## 2022-06-10 NOTE — CONSULT NOTE ADULT - SUBJECTIVE AND OBJECTIVE BOX
CHIEF COMPLAINT:    HPI:  66 yr old Male with PMHx s/p Rt DDRT 22, ESRD (HD - MWF),  CVA (), Afib on apixaban, seizure activity, CAD s/p stents, CABG (), HTN, HLD, DM on insulin, gastric/duodenal ulcer, recent hospitalization 22 -5/10/22 with weakness/anemia found to have perinephric hematoma requiring evacuation and repair of bleeding arterial anastomosis         PAST MEDICAL & SURGICAL HISTORY:  Hypertension      Diabetes      Dyslipidemia      CAD (Coronary Artery Disease)  with Stents in 2009 and 2019, s/p off-pump C3L on 20      Hypothyroidism      CVA (cerebral vascular accident)  19 with residual bilateral weakness      Anemia      PEG (percutaneous endoscopic gastrostomy) status  removed 2020      Intubation of airway performed without difficulty  Dec 2019  CVA c/b status epilepticus requiring intubation and PEG in 2019-      ESRD on dialysis  M/W/F      Seizures  after CVA, last seizure was last week 22      History of insertion of stent into coronary artery bypass graft   and       S/P CABG x 3  off pump C3L on 20          FAMILY HISTORY:  Family history of cancer of tongue (Father)  Parents are  . Father - at 80 years, tongue cancer was a smoker. Mother- at 71, had accident while crossing road. Siblings- 2 brothers and one sister.        SOCIAL HISTORY:  Smoking: [ ] Never Smoked [ ] Former Smoker (__ packs x ___ years) [ ] Current Smoker  (__ packs x ___ years)  Substance Use: [ ] Never Used [ ] Used ____  EtOH Use:  Marital Status: [ ] Single [ ]  [ ]  [ ]   Sexual History:   Occupation:  Recent Travel:  Country of Birth:  Advance Directives:    Allergies    No Known Allergies    Intolerances        HOME MEDICATIONS:    REVIEW OF SYSTEMS:            OBJECTIVE:  ICU Vital Signs Last 24 Hrs  T(C): 36.6 (10 Raffi 2022 15:55), Max: 36.6 (10 Raffi 2022 15:55)  T(F): 97.8 (10 Raffi 2022 15:55), Max: 97.8 (10 Raffi 2022 15:55)  HR: 53 (10 Raffi 2022 16:28) (53 - 66)  BP: 140/92 (10 Raffi 2022 16:27) (123/60 - 160/60)  BP(mean): --  ABP: --  ABP(mean): --  RR: 22 (10 Raffi 2022 16:27) (16 - 26)  SpO2: 100% (10 Raffi 2022 16:28) (97% - 100%)    Mode: AC/ CMV (Assist Control/ Continuous Mandatory Ventilation), RR (machine): 20, TV (machine): 400, FiO2: 40, PEEP: 5, ITime: 1, MAP: 12, PIP: 30    CAPILLARY BLOOD GLUCOSE      POCT Blood Glucose.: 208 mg/dL (10 Raffi 2022 15:48)      PHYSICAL EXAM:        HOSPITAL MEDICATIONS:  MEDICATIONS  (STANDING):  chlorhexidine 0.12% Liquid 15 milliLiter(s) Oral Mucosa every 12 hours  piperacillin/tazobactam IVPB... 3.375 Gram(s) IV Intermittent once  propofol Infusion. 20 MICROgram(s)/kG/Min (8.71 mL/Hr) IV Continuous <Continuous>    MEDICATIONS  (PRN):      LABS:                        8.9    7.31  )-----------( 301      ( 10 Raffi 2022 16:19 )             28.4     Hgb Trend: 8.9<--        Creatinine Trend:         Venous Blood Gas:  -10 @ 15:50  7.26/54/66/24/91.3  VBG Lactate: 1.3      MICROBIOLOGY:     RADIOLOGY:  [ ] Reviewed and interpreted by me    EKG:        Chela ANP-BC (ext. [id number])           CHIEF COMPLAINT:    HPI:  66 yr old Male with PMHx ESRD s/p permacath removal 5/10/22 s/p Rt DDRT 22,  CVA (), Afib on apixaban, seizure activity, CAD s/p stents, CABG (), HTN, HLD, DM on insulin, gastric/duodenal ulcer, recent hospitalization 22 -5/10/22 with weakness/anemia found to have perinephric hematoma requiring evacuation and repair of bleeding arterial anastomosis. Pt with eventual d/c to [facility] from where he presents today after developing multiple sz episodes refractory to 1 mg versed IM (EMS) and 2 mg ativan IV in E.D. concerning for status epilepticus requiring intubation for hypoxic resp failure      Consult called for hypoxic resp failure secondary to status epilepticus        PAST MEDICAL & SURGICAL HISTORY:  Hypertension      Diabetes      Dyslipidemia      CAD (Coronary Artery Disease)  with Stents in 2009 and 2019, s/p off-pump C3L on 20      Hypothyroidism      CVA (cerebral vascular accident)  19 with residual bilateral weakness      Anemia      PEG (percutaneous endoscopic gastrostomy) status  removed 2020      Intubation of airway performed without difficulty  Dec 2019  CVA c/b status epilepticus requiring intubation and PEG in 2019-      ESRD on dialysis  M/W/F      Seizures  after CVA, last seizure was last week 22      History of insertion of stent into coronary artery bypass graft   and       S/P CABG x 3  off pump C3L on 20          FAMILY HISTORY:  Family history of cancer of tongue (Father)  Parents are  . Father - at 80 years, tongue cancer was a smoker. Mother- at 71, had accident while crossing road. Siblings- 2 brothers and one sister.        SOCIAL HISTORY:  Smoking: [ ] Never Smoked [ ] Former Smoker (__ packs x ___ years) [ ] Current Smoker  (__ packs x ___ years)  Substance Use: [ ] Never Used [ ] Used ____  EtOH Use:  Marital Status: [ ] Single [ ]  [ ]  [ ]   Sexual History:   Occupation:  Recent Travel:  Country of Birth:  Advance Directives:    Allergies    No Known Allergies    Intolerances        HOME MEDICATIONS:    REVIEW OF SYSTEMS:            OBJECTIVE:  ICU Vital Signs Last 24 Hrs  T(C): 36.6 (10 Raffi 2022 15:55), Max: 36.6 (10 Raffi 2022 15:55)  T(F): 97.8 (10 Raffi 2022 15:55), Max: 97.8 (10 Raffi 2022 15:55)  HR: 53 (10 Raffi 2022 16:28) (53 - 66)  BP: 140/92 (10 Raffi 2022 16:27) (123/60 - 160/60)  BP(mean): --  ABP: --  ABP(mean): --  RR: 22 (10 Raffi 2022 16:27) (16 - 26)  SpO2: 100% (10 Raffi 2022 16:28) (97% - 100%)    Mode: AC/ CMV (Assist Control/ Continuous Mandatory Ventilation), RR (machine): 20, TV (machine): 400, FiO2: 40, PEEP: 5, ITime: 1, MAP: 12, PIP: 30    CAPILLARY BLOOD GLUCOSE      POCT Blood Glucose.: 208 mg/dL (10 Raffi 2022 15:48)      PHYSICAL EXAM:        HOSPITAL MEDICATIONS:  MEDICATIONS  (STANDING):  chlorhexidine 0.12% Liquid 15 milliLiter(s) Oral Mucosa every 12 hours  piperacillin/tazobactam IVPB... 3.375 Gram(s) IV Intermittent once  propofol Infusion. 20 MICROgram(s)/kG/Min (8.71 mL/Hr) IV Continuous <Continuous>    MEDICATIONS  (PRN):      LABS:                        8.9    7.31  )-----------( 301      ( 10 Raffi 2022 16:19 )             28.4     Hgb Trend: 8.9<--        Creatinine Trend:         Venous Blood Gas:  -10 @ 15:50  7.26/54/66/24/91.3  VBG Lactate: 1.3      MICROBIOLOGY:     RADIOLOGY:  [ ] Reviewed and interpreted by me    EKG:        Chela ANP-BC (ext. [id number])           CHIEF COMPLAINT:    HPI:  66 yr old Male with PMHx ESRD s/p permacath removal 5/10/22 s/p Rt DDRT 22,  CVA (), Afib on apixaban, seizure activity, CAD s/p stents, CABG (), HTN, HLD, DM on insulin, gastric/duodenal ulcer, recent hospitalization 22 -5/10/22 with weakness/anemia found to have perinephric hematoma requiring evacuation and repair of bleeding arterial anastomosis. Pt with eventual d/c to [facility] from where he presents today after developing multiple sz episodes refractory to 5 mg versed IM (EMS) and 2 mg ativan IV in E.D. concerning for status epilepticus requiring intubation for hypoxic resp failure      Consult called for hypoxic resp failure secondary to status epilepticus        PAST MEDICAL & SURGICAL HISTORY:  Hypertension      Diabetes      Dyslipidemia      CAD (Coronary Artery Disease)  with Stents in 2009 and 2019, s/p off-pump C3L on 20      Hypothyroidism      CVA (cerebral vascular accident)  19 with residual bilateral weakness      Anemia      PEG (percutaneous endoscopic gastrostomy) status  removed 2020      Intubation of airway performed without difficulty  Dec 2019  CVA c/b status epilepticus requiring intubation and PEG in 2019-      ESRD on dialysis  M/W/F      Seizures  after CVA, last seizure was last week 22      History of insertion of stent into coronary artery bypass graft   and       S/P CABG x 3  off pump C3L on 20          FAMILY HISTORY:  Family history of cancer of tongue (Father)  Parents are  . Father - at 80 years, tongue cancer was a smoker. Mother- at 71, had accident while crossing road. Siblings- 2 brothers and one sister.        SOCIAL HISTORY:  Smoking: [ ] Never Smoked [ ] Former Smoker (__ packs x ___ years) [ ] Current Smoker  (__ packs x ___ years)  Substance Use: [ ] Never Used [ ] Used ____  EtOH Use:  Marital Status: [ ] Single [ ]  [ ]  [ ]   Sexual History:   Occupation:  Recent Travel:  Country of Birth:  Advance Directives:    Allergies    No Known Allergies    Intolerances        HOME MEDICATIONS:    REVIEW OF SYSTEMS:            OBJECTIVE:  ICU Vital Signs Last 24 Hrs  T(C): 36.6 (10 Raffi 2022 15:55), Max: 36.6 (10 Raffi 2022 15:55)  T(F): 97.8 (10 Raffi 2022 15:55), Max: 97.8 (10 Raffi 2022 15:55)  HR: 53 (10 Raffi 2022 16:28) (53 - 66)  BP: 140/92 (10 Raffi 2022 16:27) (123/60 - 160/60)  BP(mean): --  ABP: --  ABP(mean): --  RR: 22 (10 Raffi 2022 16:27) (16 - 26)  SpO2: 100% (10 Raffi 2022 16:28) (97% - 100%)    Mode: AC/ CMV (Assist Control/ Continuous Mandatory Ventilation), RR (machine): 20, TV (machine): 400, FiO2: 40, PEEP: 5, ITime: 1, MAP: 12, PIP: 30    CAPILLARY BLOOD GLUCOSE      POCT Blood Glucose.: 208 mg/dL (10 Raffi 2022 15:48)      PHYSICAL EXAM:        HOSPITAL MEDICATIONS:  MEDICATIONS  (STANDING):  chlorhexidine 0.12% Liquid 15 milliLiter(s) Oral Mucosa every 12 hours  piperacillin/tazobactam IVPB... 3.375 Gram(s) IV Intermittent once  propofol Infusion. 20 MICROgram(s)/kG/Min (8.71 mL/Hr) IV Continuous <Continuous>    MEDICATIONS  (PRN):      LABS:                        8.9    7.31  )-----------( 301      ( 10 Raffi 2022 16:19 )             28.4     Hgb Trend: 8.9<--        Creatinine Trend:         Venous Blood Gas:  -10 @ 15:50  7.26/54/66/24/91.3  VBG Lactate: 1.3      MICROBIOLOGY:     RADIOLOGY:  [ ] Reviewed and interpreted by me    EKG:        Chela ANP-BC (ext. [id number])

## 2022-06-10 NOTE — ED PROVIDER NOTE - CARE PLAN
Principal Discharge DX:	Acute respiratory failure with hypoxia  Secondary Diagnosis:	Convulsive status epilepticus   1

## 2022-06-10 NOTE — PATIENT PROFILE ADULT - INTERPRETER NAME
Daily weight monitoring and recording  Monitor for signs and symptoms of heart failure daily.   Follow a sodium restricted diet.  Call for: weight gain >2 pounds in a day or 5 pounds in a week, abdominal bloating, SOB or lower extremity edema.   RTC for labs and symptom check  
Chayito Castelan

## 2022-06-10 NOTE — H&P ADULT - NSHPLABSRESULTS_GEN_ALL_CORE
8.9    7.31  )-----------( 301      ( 10 Raffi 2022 16:19 )             28.4       06-10    138  |  104  |  73<H>  ----------------------------<  195<H>  5.4<H>   |  20<L>  |  1.84<H>    Ca    9.2      10 Raffi 2022 16:19  Phos  4.0     06-10  Mg     2.0     06-10    TPro  7.7  /  Alb  3.2<L>  /  TBili  0.2  /  DBili  x   /  AST  32  /  ALT  16  /  AlkPhos  119  06-10                  PT/INR - ( 10 Raffi 2022 16:21 )   PT: 17.4 sec;   INR: 1.51 ratio         PTT - ( 10 Raffi 2022 16:21 )  PTT:41.3 sec    Lactate Trend            CAPILLARY BLOOD GLUCOSE      POCT Blood Glucose.: 208 mg/dL (10 Raffi 2022 15:48)

## 2022-06-10 NOTE — PROCEDURE NOTE - NSFEEDINGTUBETYPE_GI_A_CORE
Pt given discharge instructions.  PIV removed, dressing applied. Signed copy in chart. Pt states all belongings in possession. Pt ambulatory off unit with steady gait.   OGT

## 2022-06-10 NOTE — ED PROVIDER NOTE - OBJECTIVE STATEMENT
67y/o M w/ h/o HTN, HLD, T2DM (on insulin), ESRD (2/2 DM and HTN) s/p kidney transplant from 4/22, Afib (on Eliquis 2.5mg BID), hx of stroke (2019 2/2 afib), focal seizure (on depakote, keppra), CABG (2020), nonbleeding gastric/duodenal ulcer (EGD 2/9/22, on protonix) BIBEMS for seizures. Pt 67y/o M w/ h/o HTN, HLD, T2DM (on insulin), ESRD (2/2 DM and HTN) s/p kidney transplant from 4/22, Afib (on Eliquis 2.5mg BID), h/o CVA (2019 2/2 afib w/ residual R sided deficits), focal seizure (on depakote, keppra), CABG (2020), nonbleeding gastric/duodenal ulcer (EGD 2/9/22, on protonix) BIBEMS for seizures. Pt from rehab was unresponsive with facial twitching. EMS gave 5mg versed without response. Currently being treated for UTI Klebsiella.

## 2022-06-10 NOTE — ED PROVIDER NOTE - PHYSICAL EXAMINATION
Gen: NAD, toxic ill appearing  Head: normal appearing  HEENT: normal conjunctiva, oral mucosa moist  Lung: no respiratory distress, with audible gurgling, rhonchi breath sounds R side  CV: regular rate and rhythm, no murmurs  Abd: soft, non distended, non tender   MSK: no visible deformities  Neuro: No focal deficits, AAOx0, pupils reactive to light 4mm deviated to the R, R sided facial twitching  Skin: Warm  Psych: normal affect

## 2022-06-10 NOTE — PATIENT PROFILE ADULT - FALL HARM RISK - HARM RISK INTERVENTIONS

## 2022-06-10 NOTE — CONSULT NOTE ADULT - SUBJECTIVE AND OBJECTIVE BOX
Neurology  Consult Note  06-10-22    Name:  CHAD DUNBAR; 66y (1955)    Reason for Admission: Acute respiratory failure with hypoxia    HPI: 67yo man with PMH of CVA (2019) with seizure disorder, ESRD s/p renal transplant (04/2022), afib (on apixaban), CAD (s/p stents), CABG (2020), HTN, HLD, DM presents to the ED with status epilepticus. Patient intubated for respiratory failure therefore history obtained from son at bedside (Paige Dunbar), interventional cardiologist at [place]. [facility] he was witnessed to have multiple seizures. Son had a video recording of the event which showed eyes deviated to the R, rhythmic R facial twitching as well as R shoulder clonic movements lasting approximately 30 seconds each. Patient's son reports semiology of today's events was similar to prior events. Of note patient is receiving antibiotics for Klebsiella UTI. Patient received Versed 5mg with EMS, Ativan 2mg and Keppra 1g load in the ED. Patient takes Keppra 250mg BID and /250/500 to which son reports adherence. Patient follows with neurology at [place], but son would like to establish care with [facility].    Review of Systems:  Unable to obtain due to mental status        PMHx:   Hypertension    Diabetes    Dyslipidemia    CAD (Coronary Artery Disease)    CRI (Chronic Renal Insufficiency)    Hypothyroidism    CVA (cerebral vascular accident)    Anemia    PEG (percutaneous endoscopic gastrostomy) status    Intubation of airway performed without difficulty    ESRD on dialysis    Seizures      PFHx:     FH: HTN (hypertension)    Family history of cancer of tongue (Father)      PSuHx:     History of insertion of stent into coronary artery bypass graft    S/P CABG x 3        Medications:  MEDICATIONS  (STANDING):  chlorhexidine 0.12% Liquid 15 milliLiter(s) Oral Mucosa every 12 hours  chlorhexidine 4% Liquid 1 Application(s) Topical <User Schedule>  propofol Infusion. 20 MICROgram(s)/kG/Min (8.71 mL/Hr) IV Continuous <Continuous>  valproate sodium  IVPB 750 milliGRAM(s) IV Intermittent every 8 hours    MEDICATIONS  (PRN):      Vitals:  T(C): 34.9 (06-10-22 @ 18:31), Max: 36.6 (06-10-22 @ 15:55)  HR: 47 (06-10-22 @ 19:00) (47 - 66)  BP: 130/60 (06-10-22 @ 19:00) (123/60 - 166/70)  RR: 20 (06-10-22 @ 19:00) (16 - 26)  SpO2: 100% (06-10-22 @ 19:00) (97% - 100%)    Physical Examination: limited due to intubation and sedation (on propofol)  Neurologic:  - Mental Status: Eyes closed, no response to verbal stimulus and withdrawal to noxious stimuli.  - Cranial Nerves: Eyes closed, primary gaze. Pupils 2mm OU and minimally reactive. Face appears grossly symmetric although difficult to adequately assess due to intubation.  - Motor: No purposeful movements. Withdraws to noxious stimuli throughout extremities.  - Reflexes: 2+ in the R bicep and brachioradialis, trace in the L bicep and brachioradialis, knees b/l, and ankles. Clonus of 2-3 beats at the ankles b/l. Plantar responses down bilaterally.  - Sensory: Withdraws to noxious stimuli throughout extremities  - Coordination: Patient unable to complete task  - Gait: Patient unable to complete task    Labs:                        8.9    7.31  )-----------( 301      ( 10 Raffi 2022 16:19 )             28.4     06-10    138  |  104  |  73<H>  ----------------------------<  195<H>  5.4<H>   |  20<L>  |  1.84<H>    Ca    9.2      10 Raffi 2022 16:19  Phos  4.0     06-10  Mg     2.0     06-10    TPro  7.7  /  Alb  3.2<L>  /  TBili  0.2  /  DBili  x   /  AST  32  /  ALT  16  /  AlkPhos  119  06-10    CAPILLARY BLOOD GLUCOSE  POCT Blood Glucose.: 208 mg/dL (10 Raffi 2022 15:48)    LIVER FUNCTIONS - ( 10 Raffi 2022 16:19 )  Alb: 3.2 g/dL / Pro: 7.7 g/dL / ALK PHOS: 119 U/L / ALT: 16 U/L / AST: 32 U/L / GGT: x             PT/INR - ( 10 Raffi 2022 16:21 )   PT: 17.4 sec;   INR: 1.51 ratio    PTT - ( 10 Raffi 2022 16:21 )  PTT:41.3 sec        Radiology:  CT Head No Cont:  (10 Raffi 2022 17:28)  IMPRESSION:  No acute intracranial hemorrhage. Chronic findings as above.   Neurology  Consult Note  06-10-22    Name:  CHAD DUNBAR; 66y (1955)    Reason for Admission: Acute respiratory failure with hypoxia    HPI: 67yo man with PMH of CVA (2019) with seizure disorder, ESRD s/p renal transplant (04/2022), afib (on apixaban), CAD (s/p stents), CABG (2020), HTN, HLD, DM presents to the ED with status epilepticus. Patient intubated for respiratory failure therefore history obtained from son at bedside (Paige Dunbar [phone]), interventional cardiologist at [place]. [facility] to have multiple seizures. Son had a video recording of the event which showed eyes deviated to the R, rhythmic R facial twitching as well as R shoulder clonic movements lasting approximately 30 seconds each. Patient's son reports semiology of today's events was similar to prior events. Of note patient is receiving antibiotics for Klebsiella UTI. Patient received Versed 5mg with EMS, Ativan 2mg and Keppra 1g load in the ED. Patient takes Keppra 250mg BID and /250/500 to which son reports adherence. Patient follows with neurology at [place], but son would like to establish care with [facility].    Review of Systems:  Unable to obtain due to mental status        PMHx:   Hypertension    Diabetes    Dyslipidemia    CAD (Coronary Artery Disease)    CRI (Chronic Renal Insufficiency)    Hypothyroidism    CVA (cerebral vascular accident)    Anemia    PEG (percutaneous endoscopic gastrostomy) status    Intubation of airway performed without difficulty    ESRD on dialysis    Seizures      PFHx:     FH: HTN (hypertension)    Family history of cancer of tongue (Father)      PSuHx:     History of insertion of stent into coronary artery bypass graft    S/P CABG x 3        Medications:  MEDICATIONS  (STANDING):  chlorhexidine 0.12% Liquid 15 milliLiter(s) Oral Mucosa every 12 hours  chlorhexidine 4% Liquid 1 Application(s) Topical <User Schedule>  propofol Infusion. 20 MICROgram(s)/kG/Min (8.71 mL/Hr) IV Continuous <Continuous>  valproate sodium  IVPB 750 milliGRAM(s) IV Intermittent every 8 hours    MEDICATIONS  (PRN):      Vitals:  T(C): 34.9 (06-10-22 @ 18:31), Max: 36.6 (06-10-22 @ 15:55)  HR: 47 (06-10-22 @ 19:00) (47 - 66)  BP: 130/60 (06-10-22 @ 19:00) (123/60 - 166/70)  RR: 20 (06-10-22 @ 19:00) (16 - 26)  SpO2: 100% (06-10-22 @ 19:00) (97% - 100%)    Physical Examination: limited due to intubation and sedation (on propofol)  Neurologic:  - Mental Status: Eyes closed, no response to verbal stimulus and withdrawal to noxious stimuli.  - Cranial Nerves: Eyes closed, primary gaze. Pupils 2mm OU and minimally reactive. Face appears grossly symmetric although difficult to adequately assess due to intubation.  - Motor: No purposeful movements. Withdraws to noxious stimuli throughout extremities.  - Reflexes: 2+ in the R bicep and brachioradialis, trace in the L bicep and brachioradialis, knees b/l, and ankles. Clonus of 2-3 beats at the ankles b/l. Plantar responses down bilaterally.  - Sensory: Withdraws to noxious stimuli throughout extremities  - Coordination: Patient unable to complete task  - Gait: Patient unable to complete task    Labs:                        8.9    7.31  )-----------( 301      ( 10 Raffi 2022 16:19 )             28.4     06-10    138  |  104  |  73<H>  ----------------------------<  195<H>  5.4<H>   |  20<L>  |  1.84<H>    Ca    9.2      10 Raffi 2022 16:19  Phos  4.0     06-10  Mg     2.0     06-10    TPro  7.7  /  Alb  3.2<L>  /  TBili  0.2  /  DBili  x   /  AST  32  /  ALT  16  /  AlkPhos  119  06-10    CAPILLARY BLOOD GLUCOSE  POCT Blood Glucose.: 208 mg/dL (10 Raffi 2022 15:48)    LIVER FUNCTIONS - ( 10 Raffi 2022 16:19 )  Alb: 3.2 g/dL / Pro: 7.7 g/dL / ALK PHOS: 119 U/L / ALT: 16 U/L / AST: 32 U/L / GGT: x             PT/INR - ( 10 Raffi 2022 16:21 )   PT: 17.4 sec;   INR: 1.51 ratio    PTT - ( 10 Raffi 2022 16:21 )  PTT:41.3 sec        Radiology:  CT Head No Cont:  (10 Raffi 2022 17:28)  IMPRESSION:  No acute intracranial hemorrhage. Chronic findings as above.   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 Patient intubated for respiratory failure therefore history obtained from son at bedside (Paige Dunbar [phone]), interventional cardiologist at [place]. Patient was at [facility] rehab where he was witnessed to have multiple seizures. Son had a video recording of the event which showed eyes deviated to the R, rhythmic R facial twitching as well as R shoulder clonic movements lasting approximately 30 seconds each. Patient's son reports semiology of today's events is similar to prior seizures. Patient's son notes that VPA level on Tuesday was 8. Of note patient has been receiving antibiotics for Klebsiella UTI. Last seizure was 04/08/22 with EEG that month demonstrating L frontocentral seizures of focal onset. Patient received Versed 5mg with EMS, Ativan 2mg and Keppra 1g load in the ED. Patient takes Keppra 250mg BID and /250/500 to which son reports adherence. Patient follows with neurology at [place], but son would like to establish care with [facility].  Review of Systems:  Unable to obtain due to mental status        PMHx:   Hypertension    Diabetes    Dyslipidemia    CAD (Coronary Artery Disease)    CRI (Chronic Renal Insufficiency)    Hypothyroidism    CVA (cerebral vascular accident)    Anemia    PEG (percutaneous endoscopic gastrostomy) status    Intubation of airway performed without difficulty    ESRD on dialysis    Seizures      PFHx:     FH: HTN (hypertension)    Family history of cancer of tongue (Father)      PSuHx:     History of insertion of stent into coronary artery bypass graft    S/P CABG x 3        Medications:  MEDICATIONS  (STANDING):  chlorhexidine 0.12% Liquid 15 milliLiter(s) Oral Mucosa every 12 hours  chlorhexidine 4% Liquid 1 Application(s) Topical <User Schedule>  propofol Infusion. 20 MICROgram(s)/kG/Min (8.71 mL/Hr) IV Continuous <Continuous>  valproate sodium  IVPB 750 milliGRAM(s) IV Intermittent every 8 hours    MEDICATIONS  (PRN):      Vitals:  T(C): 34.9 (06-10-22 @ 18:31), Max: 36.6 (06-10-22 @ 15:55)  HR: 47 (06-10-22 @ 19:00) (47 - 66)  BP: 130/60 (06-10-22 @ 19:00) (123/60 - 166/70)  RR: 20 (06-10-22 @ 19:00) (16 - 26)  SpO2: 100% (06-10-22 @ 19:00) (97% - 100%)    Physical Examination: limited due to intubation and sedation (on propofol)  Neurologic:  - Mental Status: Eyes closed, no response to verbal stimulus and withdrawal to noxious stimuli.  - Cranial Nerves: Eyes closed, primary gaze. Pupils 2mm OU and minimally reactive. Face appears grossly symmetric although difficult to adequately assess due to intubation.  - Motor: No purposeful movements. Withdraws to noxious stimuli throughout extremities.  - Reflexes: 2+ in the R bicep and brachioradialis, trace in the L bicep and brachioradialis, knees b/l, and ankles. Clonus of 2-3 beats at the ankles b/l. Plantar responses down bilaterally.  - Sensory: Withdraws to noxious stimuli throughout extremities  - Coordination: Patient unable to complete task  - Gait: Patient unable to complete task    Labs:                        8.9    7.31  )-----------( 301      ( 10 Raffi 2022 16:19 )             28.4     06-10    138  |  104  |  73<H>  ----------------------------<  195<H>  5.4<H>   |  20<L>  |  1.84<H>    Ca    9.2      10 Raffi 2022 16:19  Phos  4.0     06-10  Mg     2.0     06-10    TPro  7.7  /  Alb  3.2<L>  /  TBili  0.2  /  DBili  x   /  AST  32  /  ALT  16  /  AlkPhos  119  06-10    CAPILLARY BLOOD GLUCOSE  POCT Blood Glucose.: 208 mg/dL (10 Raffi 2022 15:48)    LIVER FUNCTIONS - ( 10 Raffi 2022 16:19 )  Alb: 3.2 g/dL / Pro: 7.7 g/dL / ALK PHOS: 119 U/L / ALT: 16 U/L / AST: 32 U/L / GGT: x             PT/INR - ( 10 Raffi 2022 16:21 )   PT: 17.4 sec;   INR: 1.51 ratio    PTT - ( 10 Raffi 2022 16:21 )  PTT:41.3 sec        Radiology:  CT Head No Cont:  (10 Raffi 2022 17:28)  IMPRESSION:  No acute intracranial hemorrhage. Chronic findings as above.   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 Patient intubated for respiratory failure therefore history obtained from son at bedside (Paige Dunbar [phone]), interventional cardiologist at [place]. Patient was at [facility] rehab where he was witnessed to have multiple seizures. Son had a video recording of the event which showed eyes deviated to the R, rhythmic R facial twitching as well as R shoulder clonic movements lasting approximately 30 seconds each. Patient's son reports semiology of today's events is similar to prior seizures. Patient's son notes that VPA level on Tuesday was 8. Of note patient has been receiving antibiotics for Klebsiella UTI. Last seizure was 04/08/22 with EEG that month demonstrating L frontocentral seizures of focal onset. Patient received Versed 5mg with EMS, Ativan 2mg and Keppra 1g load in the ED. Patient takes Keppra 250mg BID and /250/500 to which son reports adherence. Patient follows with neurology at [place], but son would like to establish care with [facility].  Review of Systems:  Unable to obtain due to mental status        PMHx:   Hypertension    Diabetes    Dyslipidemia    CAD (Coronary Artery Disease)    CRI (Chronic Renal Insufficiency)    Hypothyroidism    CVA (cerebral vascular accident)    Anemia    PEG (percutaneous endoscopic gastrostomy) status    Intubation of airway performed without difficulty    ESRD on dialysis    Seizures      PFHx:     FH: HTN (hypertension)    Family history of cancer of tongue (Father)      PSuHx:     History of insertion of stent into coronary artery bypass graft    S/P CABG x 3        Medications:  MEDICATIONS  (STANDING):  chlorhexidine 0.12% Liquid 15 milliLiter(s) Oral Mucosa every 12 hours  chlorhexidine 4% Liquid 1 Application(s) Topical <User Schedule>  propofol Infusion. 20 MICROgram(s)/kG/Min (8.71 mL/Hr) IV Continuous <Continuous>  valproate sodium  IVPB 750 milliGRAM(s) IV Intermittent every 8 hours    MEDICATIONS  (PRN):      Vitals:  T(C): 34.9 (06-10-22 @ 18:31), Max: 36.6 (06-10-22 @ 15:55)  HR: 47 (06-10-22 @ 19:00) (47 - 66)  BP: 130/60 (06-10-22 @ 19:00) (123/60 - 166/70)  RR: 20 (06-10-22 @ 19:00) (16 - 26)  SpO2: 100% (06-10-22 @ 19:00) (97% - 100%)    Physical Examination: limited due to intubation and sedation (on propofol)  GENERAL EXAM:  Constitutional: intubated, NAD  Head: normocephalic atraumatic  Eyes: nonicteric, clear conjunctiva  Neck: Supple, no masses  Resp: breathing with ventilator  Musculoskeletal: no joint swelling/tenderness, no clubbing or cyanosis, no joint deformity  Skin: no rashes or areas of discoloration    Neurologic:  - Mental Status: Eyes closed, no response to verbal stimulus and withdrawal to noxious stimuli.  - Cranial Nerves: Eyes closed, primary gaze. Pupils 2mm OU and minimally reactive. Face appears grossly symmetric although difficult to adequately assess due to intubation.  - Motor: No purposeful movements. Withdraws to noxious stimuli throughout extremities.  - Reflexes: 2+ in the R bicep and brachioradialis, trace in the L bicep and brachioradialis, knees b/l, and ankles. Clonus of 2-3 beats at the ankles b/l. Plantar responses down bilaterally.  - Sensory: Withdraws to noxious stimuli throughout extremities  - Coordination: Patient unable to complete task  - Gait: Patient unable to complete task    Labs:                        8.9    7.31  )-----------( 301      ( 10 Raffi 2022 16:19 )             28.4     06-10    138  |  104  |  73<H>  ----------------------------<  195<H>  5.4<H>   |  20<L>  |  1.84<H>    Ca    9.2      10 Raffi 2022 16:19  Phos  4.0     06-10  Mg     2.0     06-10    TPro  7.7  /  Alb  3.2<L>  /  TBili  0.2  /  DBili  x   /  AST  32  /  ALT  16  /  AlkPhos  119  06-10    CAPILLARY BLOOD GLUCOSE  POCT Blood Glucose.: 208 mg/dL (10 Raffi 2022 15:48)    LIVER FUNCTIONS - ( 10 Raffi 2022 16:19 )  Alb: 3.2 g/dL / Pro: 7.7 g/dL / ALK PHOS: 119 U/L / ALT: 16 U/L / AST: 32 U/L / GGT: x             PT/INR - ( 10 Raffi 2022 16:21 )   PT: 17.4 sec;   INR: 1.51 ratio    PTT - ( 10 Raffi 2022 16:21 )  PTT:41.3 sec        Radiology:  CT Head No Cont:  (10 Raffi 2022 17:28)  IMPRESSION:  No acute intracranial hemorrhage. Chronic findings as above.

## 2022-06-10 NOTE — ED PROVIDER NOTE - ATTENDING CONTRIBUTION TO CARE
Attending MD Zaragoza:  I personally have seen and examined this patient. I have performed a substantive portion of the visit including all aspects of the medical decision making.  Resident note reviewed and agree on plan of care and except where noted.      66M with CVA, afib on eliquis, seizure disorder on keppra and valproate presenting from rehab facility for concern for multiple seizure like episodes today. Patient in triage bay noted to have head turned to the right, eyes deviated to the right, right facial twitching and RUE tonic-clonic activity with decreased awareness and gurgling respirations, given IM versed by EMS but symptoms persisted thus ativan 2mg IV given without effect, concern for status epilepticus with impending airway compromise thus intubation via RSI performed. Plan for CT head to ro ICH, labs, ECG, neurology consult, MICU consultation. IV keppra load 1g given.             *The above represents an initial assessment/impression. Please refer to progress notes for potential changes in patient clinical course*

## 2022-06-10 NOTE — CONSULT NOTE ADULT - ATTENDING COMMENTS
This patient was seen and evaluated  on 6/11/22 . This case was discussed on rounds with the neurology team. I personally reviewed the HPI, PMH, FH, SH, ROS and medications. I repeated pertinent portions of the examination and reviewed the relevant imaging and laboratory data. I agree with the findings, assessment and plan as documented and have edited where necessary.     67yo man with PMH of large bilateral CVA (2019) with subsequent seizure disorder, ESRD s/p renal transplant (04/2022), afib (on apixaban), CAD (s/p stents), CABG (2020), HTN, HLD, DM presents to the ED with status epilepticus from [facility]. Hx obtained from son Dr. jennifer Castelan at bedside. Pt previously on Keppra and Vimpat, vimpat stopped due to cost and was only on keppra , but had seizure due to HD and keppra dosing. After renal transplant has been on keppra 250mg BID and VPA 500mg BID. Son has video of seizure activity and Semiology includes eyes deviated to the R, rhythmic R facial twitching as well as R shoulder clonic movements lasting approximately 30 seconds each. Patient was recently receiving antibiotics for Klebsiella UTI. He follows with an epileptologist at  [facility].  On presentation, patient received Versed 5mg with EMS, Ativan 2mg and Keppra 1g load in the ED. Labs remarkable for VPA level 34, was 8 at the rehab. EEG from 04/2022 showed L frontocentral focal onset seizures lasting 40-60 seconds.    Pt this morning off propofol, starting wake, making semipurposeful movements , no abnormal facial twitching or eye deviation, tone mildly increased in the UEs bilat, he has baseline R>L weakness due to his CVA. Not following commands.   CTH extensive encephalomalacia. EEG overnight and this morning without seizure activity.     Pt with hx of CVA with encephalomalacia, hx of focal seizure with impaired awareness, p/w recent UTI, status epilepticus, with subtherapeutic VPA levels. Unclear why level is so low, could be metabolism of the medication, vs nonadherence but unlikely a he was  in rehab.     - Continue EEG for another 24hrs, if stable then can be discontinued.  - continue Keppra 250mg BID, VPA 750mg BID  - check VPA level in AM before morning dose  - seizure precautions  - neuro checks q2hrs  - treatment of infxn or metabolic derrangement as per MICu team

## 2022-06-10 NOTE — ED ADULT NURSE NOTE - NSIMPLEMENTINTERV_GEN_ALL_ED
Implemented All Fall with Harm Risk Interventions:  [place] to call system. Call bell, personal items and telephone within reach. Instruct patient to call for assistance. Room bathroom lighting operational. Non-slip footwear when patient is off stretcher. Physically safe environment: no spills, clutter or unnecessary equipment. Stretcher in lowest position, wheels locked, appropriate side rails in place. Provide visual cue, wrist band, yellow gown, etc. Monitor gait and stability. Monitor for mental status changes and reorient to person, place, and time. Review medications for side effects contributing to fall risk. Reinforce activity limits and safety measures with patient and family. Provide visual clues: red socks.

## 2022-06-10 NOTE — CONSULT NOTE ADULT - ASSESSMENT
67yo man with PMH of CVA (2019) with seizure disorder, ESRD s/p renal transplant (04/2022), afib (on apixaban), CAD (s/p stents), CABG (2020), HTN, HLD, DM presents to the ED with status epilepticus. Semiology includes eyes deviated to the R, rhythmic R facial twitching as well as R shoulder clonic movements lasting approximately 30 seconds each. Of note patient is receiving antibiotics for Klebsiella UTI. Patient received Versed 5mg with EMS, Ativan 2mg and Keppra 1g load in the ED. VPA level 34. EEG from 04/2022 showed L fronto-central focal onset seizures.    Impression: Status epilepticus likely provoked in the setting of UTI with insufficient AED coverage, with possible decreased seizure threshold.    Recommendations:  [] 24hr vEEG  [] Telemetry monitoring  [] Continue Keppra 250mg IV q12h  [] VPA load with 20mcg/kg now then increase VPA to 750mg IV q8h  [] VPA and albumin level in the am  [] Neuro checks and vital signs per unit protocol  [] Seizure/fall/aspiration precautions    Case discussed with epilepsy fellow Dr. Silva.  Case to be seen and discussed with neurology attending Dr. Domingo 67yo man with PMH of CVA (2019) with seizure disorder, ESRD s/p renal transplant (04/2022), afib (on apixaban), CAD (s/p stents), CABG (2020), HTN, HLD, DM presents to the ED with status epilepticus. Semiology includes eyes deviated to the R, rhythmic R facial twitching as well as R shoulder clonic movements lasting approximately 30 seconds each. Of note patient is receiving antibiotics for Klebsiella UTI. Patient received Versed 5mg with EMS, Ativan 2mg and Keppra 1g load in the ED. VPA level 34. EEG from 04/2022 showed L fronto-central focal onset seizures.    Impression: Status epilepticus likely provoked in the setting of UTI with insufficient AED coverage, with possible decreased seizure threshold in the setting of known focal motor seizures.    Recommendations:  [] 24hr vEEG  [] Telemetry monitoring  [] Continue Keppra 250mg IV q12h  [] VPA load with 20mcg/kg now then increase VPA to 750mg IV q8h  [] VPA and albumin level in the am  [] Neuro checks and vital signs per unit protocol  [] Seizure/fall/aspiration precautions    Case discussed with epilepsy fellow Dr. Silva.  Case to be seen and discussed with neurology attending Dr. Domingo 67yo man with PMH of CVA (2019) with seizure disorder, ESRD s/p renal transplant (04/2022), afib (on apixaban), CAD (s/p stents), CABG (2020), HTN, HLD, DM presents to the ED with status epilepticus. Semiology includes eyes deviated to the R, rhythmic R facial twitching as well as R shoulder clonic movements lasting approximately 30 seconds each. Of note patient is receiving antibiotics for Klebsiella UTI. Patient received Versed 5mg with EMS, Ativan 2mg and Keppra 1g load in the ED. VPA level 34. EEG from 04/2022 showed L fronto-central focal onset seizures.    Impression: Status epilepticus likely provoked in the setting of UTI and insufficient AED coverage, with possible decreased seizure threshold in the setting of known focal motor seizures.    Recommendations:  [] 24hr vEEG  [] Telemetry monitoring  [] Continue Keppra 250mg IV q12h  [] VPA load with 20mcg/kg now then increase VPA to 750mg IV q8h  [] VPA and albumin level in the am  [] Monitor CBC, cmp, Mg, Phos, Ca, CK  [] Neuro checks and vital signs per unit protocol  [] Seizure/fall/aspiration precautions  [] Advise against driving, operating heavy machinery, water sports/bathing, activity in elevation without appropriate safety precautions    Case discussed with epilepsy fellow Dr. Silva.  Case to be seen and discussed with neurology attending Dr. Domingo 65yo man with PMH of CVA (2019) with seizure disorder, ESRD s/p renal transplant (04/2022), afib (on apixaban), CAD (s/p stents), CABG (2020), HTN, HLD, DM presents to the ED with status epilepticus. Semiology includes eyes deviated to the R, rhythmic R facial twitching as well as R shoulder clonic movements lasting approximately 30 seconds each. Of note patient is receiving antibiotics for Klebsiella UTI. Patient received Versed 5mg with EMS, Ativan 2mg and Keppra 1g load in the ED. VPA level 34. EEG from 04/2022 showed L fronto-central focal onset seizures.    Impression: Status epilepticus likely provoked in the setting of UTI and insufficient AED coverage, with possible decreased seizure threshold in the setting of known focal motor seizures.    Recommendations:  [] 24hr vEEG  [] Telemetry monitoring  [] Continue Keppra 250mg IV q12h  [] VPA load with 20mcg/kg now then increase VPA to 750mg IV q8h  [] VPA and albumin level in the am  [] Monitor CBC, Cmp, Mg, Phos, Ca, CK  [] Neuro checks and vital signs per unit protocol  [] Seizure/fall/aspiration precautions  [] Advise against driving, operating heavy machinery, water sports/bathing, activity in elevation without appropriate safety precautions    Case discussed with epilepsy fellow Dr. Silva.  Case to be seen and discussed with neurology attending Dr. Domingo 67yo man with PMH of CVA (2019) with seizure disorder, ESRD s/p renal transplant (04/2022), afib (on apixaban), CAD (s/p stents), CABG (2020), HTN, HLD, DM presents to the ED with status epilepticus. Semiology includes eyes deviated to the R, rhythmic R facial twitching as well as R shoulder clonic movements lasting approximately 30 seconds each. Of note patient is receiving antibiotics for Klebsiella UTI. Patient received Versed 5mg with EMS, Ativan 2mg and Keppra 1g load in the ED. Labs remarkable for VPA level 34. EEG from 04/2022 showed L frontocentral focal onset seizures.    Impression: Status epilepticus likely provoked in the setting of UTI and insufficient AED coverage, with possible decreased seizure threshold in the setting of known focal motor seizures.    Recommendations:  [] 24hr vEEG  [] Telemetry monitoring  [] Continue Keppra 250mg IV q12h  [] VPA load with 20mcg/kg now then increase VPA to 750mg IV q8h  [] VPA and albumin level in the am  [] Monitor CBC, Cmp, Mg, Phos, Ca, CK  [] Neuro checks and vital signs per unit protocol  [] Seizure/fall/aspiration precautions  [] Advise against driving, operating heavy machinery, water sports/bathing, activity in elevation without appropriate safety precautions    Case discussed with epilepsy fellow Dr. Silva.  Case to be seen and discussed with neurology attending Dr. Domingo 67yo man with PMH of CVA (2019) with seizure disorder, ESRD s/p renal transplant (04/2022), afib (on apixaban), CAD (s/p stents), CABG (2020), HTN, HLD, DM presents to the ED with status epilepticus. Semiology includes eyes deviated to the R, rhythmic R facial twitching as well as R shoulder clonic movements lasting approximately 30 seconds each. Of note patient is receiving antibiotics for Klebsiella UTI. Patient received Versed 5mg with EMS, Ativan 2mg and Keppra 1g load in the ED. Labs remarkable for VPA level 34. EEG from 04/2022 showed L frontocentral focal onset seizures.    Impression: Status epilepticus likely provoked in the setting of UTI and insufficient AED coverage, with possible decreased seizure threshold in the setting of known focal motor seizures.    Recommendations:  [] 24hr vEEG  [] Telemetry monitoring  [] Continue Keppra 250mg IV q12h  [] VPA load with 20mcg/kg now then increase VPA to 750mg IV q8h  [] VPA and albumin level in the am  [] Monitor CBC, CMP, Mg, Phos, Ca, CK  [] Neuro checks and vital signs per unit protocol  [] Seizure/fall/aspiration precautions  [] Advise against driving, operating heavy machinery, water sports/bathing, activity in elevation without appropriate safety precautions    Case discussed with epilepsy fellow Dr. Silva.  Case to be seen and discussed with neurology attending Dr. Domingo

## 2022-06-10 NOTE — ED ADULT NURSE NOTE - OBJECTIVE STATEMENT
Patient is a 67 y/o male with PMH of seizures, CVA, HTN, HLD, A-fib on Eliquis, DM type II, ESRD presenting to the ED via EMS coming from [facility] with c/o seizure activity. Patient was found unresponsive with facial twitching. Patient seized for approx 30 secs in route with EMS. Patient seized twice in ED. 5mg of Midazolam administered by EMS IM. 2mg IV Ativan administered, 1000mg Keppra administered. Patient gurgling, not maintaining airway. Patient suctioned. Patient intubated at 1603, 7.5 F, 25 lip, positive color change, bilateral breath sounds. EtCO2 monitoring maintained. Patient bradycardic at baseline as per son, cardiac monitoring maintained. Propofol drip started after intubation. Peripheral pulses present. Patient's son denies n/v/d, son denies fever, cough, chills.

## 2022-06-10 NOTE — ED ADULT NURSE NOTE - NSFALLRSKINDICATORS_ED_ALL_ED
Bilateral MAMMO Bilat Screen DDI+DAMARIS.

 

CLINICAL HISTORY:

Patient is 80 years old and is seen for screening. The patient has no family

history of breast cancer.  The patient has no personal history of cancer.

 

VIEWS:

The views performed were:  bilateral craniocaudal with tomosynthesis and

bilateral mediolateral oblique with tomosynthesis.

 

FILMS COMPARED:

The present examination has been compared to prior imaging studies performed at

[facility] on 10/19/2018 and 10/29/2019.

 

This study has been interpreted with the assistance of computer-aided detection.

 

MAMMOGRAM FINDINGS:

There are scattered fibroglandular densities.

 

There are stable benign appearing calcifications seen in both breasts.

 

There are also vascular calcifications.

 

There are no suspicious masses, suspicious calcifications, or new areas of

architectural distortion.

 

IMPRESSION:

THERE IS NO MAMMOGRAPHIC EVIDENCE OF MALIGNANCY.

 

A ROUTINE FOLLOW-UP MAMMOGRAM IN 1 YEAR IS RECOMMENDED.

 

THE RESULTS OF THIS EXAM WERE SENT TO THE PATIENT.

 

ACR BI-RADS Category 2 - Benign finding

 

MAMMOGRAPHY NOTE:

 1. A negative mammogram report should not delay a biopsy if a dominant of

 clinically suspicious mass is present.

 2. Approximately 10% to 15% of breast cancers are not detected by

 mammography.

 3. Adenosis and dense breasts may obscure an underlying neoplasm.

 

 

Reported by: PATRICK MOREJON MD

Electonically Signed: [id number] yes

## 2022-06-10 NOTE — H&P ADULT - ASSESSMENT
66 yr old Male with PMHx ESRD s/p permacath removal 5/10/22 s/p Rt DDRT 4/21/22,  CVA (2019), Afib on apixaban, seizure activity, CAD s/p stents, CABG (2020), HTN, HLD, DM on insulin, gastric/duodenal ulcer, recent hospitalization 4/28/22 -5/10/22 with weakness/anemia found to have perinephric hematoma s/p repair and evacuation. Today the patient has been accepted to the MICU for status epilepticus with associated respiratory failure.     Neuro   Status Epilepticus   - 24 hr EEG   - CT head completed (P) results   - Neuro check PER ICU protocol   - F/u MRI if mental status not improved   - check ammonia level on admission   - Obtain all levels of AED   - continue with Keppra / Depakote dosing adjusted per neuro reccs     CV   Hemodynamically stable   - Will continue to monitor SBP   - Will resume AntHTN meds  66 yr old Male with PMHx ESRD s/p permacath removal 5/10/22 s/p Rt DDRT 4/21/22,  CVA (2019), Afib on apixaban, seizure activity, CAD s/p stents, CABG (2020), HTN, HLD, DM on insulin, gastric/duodenal ulcer, recent hospitalization 4/28/22 -5/10/22 with weakness/anemia found to have perinephric hematoma s/p repair and evacuation. Today the patient has been accepted to the MICU for status epilepticus with associated respiratory failure.     Neuro   Status Epilepticus   - 24 hr EEG   - CT head completed (P) results   - Neuro check PER ICU protocol   - F/u MRI if mental status not improved   - check ammonia level on admission   - Obtain all levels of AED   - continue with Keppra / Depakote dosing adjusted per neuro reccs     CV   Hemodynamically stable   - Will continue to monitor SBP   - Will resume AntHTN meds if SBP >175   - POCUS study on admission   - Will obtain CE EKG and Official Echo for admission f/u     Pulm   Hypoxic respiratory failure secondary s/p intubation   - Maintain mechanical Ventilation   - Check ABG / Chest X ray on admission   - wean to extubate once medically appropriate     GI  66 yr old Male with PMHx ESRD s/p permacath removal 5/10/22 s/p Rt DDRT 4/21/22,  CVA (2019), Afib on apixaban, seizure activity, CAD s/p stents, CABG (2020), HTN, HLD, DM on insulin, gastric/duodenal ulcer, recent hospitalization 4/28/22 -5/10/22 with weakness/anemia found to have perinephric hematoma s/p repair and evacuation. Today the patient has been accepted to the MICU for status epilepticus with associated respiratory failure.     Neuro   Status Epilepticus   - 24 hr EEG   - CT head completed (P) results   - Neuro check PER ICU protocol   - F/u MRI if mental status not improved   - check ammonia level on admission   - Obtain all levels of AED   - continue with Keppra / Depakote dosing adjusted per neuro reccs     CV   Hemodynamically stable   - Will continue to monitor SBP   - Will resume AntHTN meds if SBP >175   - POCUS study on admission   - Will obtain CE EKG and Official Echo for admission f/u     Afib   - continue with telemetry and rate control   - will place patient on Heparin while in patient for possible procedures needed       Pulm   Hypoxic respiratory failure secondary s/p intubation   - Maintain mechanical Ventilation   - Check ABG / Chest X ray on admission   - wean to extubate once medically appropriate     GI   Oral pharyngeal Dysphagia secondary to intubation   - OGT placment   - Will maintain NPO for now will re evaluate Tube Feed later tonight   - PPI   - Bowel regimen        s/p Rt DDRT  - will consult Transplant nephrology   - for now continue with mycophenolate / Prednisone    - per Home Meds the patient was on Levoquin for UTI   - Will check UA and Urine cx   - follow BUN Creat   -Miranda for strict I/O Vs bladder scan q 6   - supplement electrolytes     Endo   Hypothyroidism  - Check TSH   - will maintain home dose  synthroid   - check Hg A1c   - ISS q 6 hr / hypoglycemia protocol     ID   Per Medication list (+) abt tx for UTI   - will assess Urine at this time the patietn does not appear to be toxic   - will check blood cx urine cx MRSA swab and RVP   - Hold Abt tx for now low threshhold for resumption   - Will f/u with neuro for possible LP if patients clinical ststus decline     VTE   Heparin Gtt                  66 yr old Male with PMHx ESRD s/p permacath removal 5/10/22 s/p Rt DDRT 4/21/22,  CVA (2019), Afib on apixaban, seizure activity, CAD s/p stents, CABG (2020), HTN, HLD, DM on insulin, gastric/duodenal ulcer, recent hospitalization 4/28/22 -5/10/22 with weakness/anemia found to have perinephric hematoma s/p repair and evacuation. Today the patient has been accepted to the MICU for status epilepticus with associated respiratory failure.     Neuro   Status Epilepticus   - 24 hr EEG   - CT head completed (P) results   - Neuro check PER ICU protocol   - F/u MRI if mental status not improved   - check ammonia level on admission   - Obtain all levels of AED   - continue with Keppra / Depakote dosing adjusted per neuro reccs     CV   Hemodynamically stable   - Will continue to monitor SBP   - Will resume AntHTN meds if SBP >175   - POCUS study on admission   - Will obtain CE EKG and Official Echo for admission f/u     Afib   - continue with telemetry and rate control   - will place patient on Heparin while in patient for possible procedures needed       Pulm   Hypoxic respiratory failure secondary s/p intubation   - Maintain mechanical Ventilation   - Check ABG / Chest X ray on admission   - wean to extubate once medically appropriate     GI   Oral pharyngeal Dysphagia secondary to intubation   - OGT placment   - Will maintain NPO for now will re evaluate Tube Feed later tonight   - PPI   - Bowel regimen        s/p Rt DDRT  - will consult Transplant nephrology   - for now continue with mycophenolate / Prednisone    - per Home Meds the patient was on Levoquin for UTI   - Will check UA and Urine cx   - follow BUN Creat   -Miranda for strict I/O Vs bladder scan q 6   - supplement electrolytes     Endo   Hypothyroidism  - Check TSH   - will maintain home dose  synthroid   - check Hg A1c   - ISS q 6 hr / hypoglycemia protocol     ID   Per Medication list (+) abt tx for UTI   - will assess Urine at this time the patietn does not appear to be toxic   - will check blood cx urine cx MRSA swab and RVP   - Empiric ABT tx with cefepime and Vanco by level dosing   - Will f/u with neuro for possible LP if patients clinical ststus decline     VTE   Heparin Gtt

## 2022-06-10 NOTE — ED ADULT TRIAGE NOTE - ESI TRIAGE ACUITY LEVEL, MLM
2
asthma exacc and pos flu with epigastric pain---await ICU eval and CTA to eval for PE --- if pt rejected from ICU or 7L -- admit to floor per L and D  they fwill follow along

## 2022-06-10 NOTE — ED PROVIDER NOTE - NS ED MD DISPO SPECIAL CONSIDERATION1
February 20, 2019      Isabel E Axel  [id number] [place] [facility] [id number]        Dear Ms. Reyna:    The biopsy obtained at your recent visit here showed:    superficial basal cell on the shoulder that was treated at your visit, call if new spots or concerns with healing.    Please feel free to contact me if you have any questions.    Sincerely,         Alyssa Anton M.D.   Dermatology  [facility]   [id number] [place]  [address] [phone]       These results were reviewed by Dr. Alyssa Anton.   None

## 2022-06-11 NOTE — DIETITIAN INITIAL EVALUATION ADULT - ADD RECOMMEND
Suggest bedside swallow assessment prior to diet advancement. Recommend consistent carbohydrate restriction if PO diet feasible - monitor electrolytes and restrict PRN. Consider multivitamin and vitamin C if medically feasible to promote wound healing. Continue to monitor nutritional intake, labs, weights, BM, skin, clinical course.

## 2022-06-11 NOTE — PHYSICAL THERAPY INITIAL EVALUATION ADULT - MODALITIES TREATMENT COMMENTS
HEaling stage II wound distal coccyx measuring 0.5cmx0.5cmx0.1cm -clean, pale pink no s/s of infection or mal odor noted

## 2022-06-11 NOTE — CONSULT NOTE ADULT - SUBJECTIVE AND OBJECTIVE BOX
Transplant Surgery - Consult Note  --------------------------------------------------------------  DCD DDRT / LDRT    Date:         POD#    Present:   Patient seen and examined with multidisciplinary team including Transplant Surgeon: Dr. Maddox, Dr. Tena, Dr. Davis, Dr. Mann, Dr. David, Dr. Barber. Transplant Nephrologist: Dr. Alfred, Dr. Waqar Zavaleta, Dr. Mary Lomeli,   Pharmacist: Danica Swartz. ACPs Jensen Garcia Zouloufis, Dhah, Inoyatova, Timotheose and unit RN during am rounds.  Disciplines not in attendance will be notified of the plan.     Interval Events:    Immunosupression:   Induction:                                                Maintenance immunosupression:  Ongoing monitoring for signs of rejection.    Potential Discharge date:    Education:  Medications    Plan of care:  See Below    MEDICATIONS  (STANDING):  amLODIPine   Tablet 5 milliGRAM(s) Oral daily  atorvastatin 40 milliGRAM(s) Oral at bedtime  carvedilol 6.25 milliGRAM(s) Oral every 12 hours  cefepime   IVPB 1000 milliGRAM(s) IV Intermittent every 8 hours  cefepime   IVPB      chlorhexidine 0.12% Liquid 15 milliLiter(s) Oral Mucosa every 12 hours  chlorhexidine 4% Liquid 1 Application(s) Topical <User Schedule>  fluconAZOLE   40 mG/mL Suspension 200 milliGRAM(s) Oral daily  heparin  Infusion.  Unit(s)/Hr (11 mL/Hr) IV Continuous <Continuous>  insulin lispro (ADMELOG) corrective regimen sliding scale   SubCutaneous every 6 hours  levETIRAcetam  IVPB 250 milliGRAM(s) IV Intermittent every 12 hours  levothyroxine Injectable 40 MICROGram(s) IV Push at bedtime  magnesium oxide 400 milliGRAM(s) Oral three times a day with meals  mycophenolate mofetil Suspension 500 milliGRAM(s) Oral two times a day  pantoprazole  Injectable 40 milliGRAM(s) IV Push daily  predniSONE   Tablet 5 milliGRAM(s) Oral daily  propofol Infusion. 20 MICROgram(s)/kG/Min (8.71 mL/Hr) IV Continuous <Continuous>  tacrolimus    0.5 mG/mL Suspension 2 milliGRAM(s) Oral two times a day  trimethoprim   80 mG/sulfamethoxazole 400 mG 1 Tablet(s) Oral daily  valGANciclovir 50 mG/mL Oral Solution 450 milliGRAM(s) Oral <User Schedule>  valproate sodium  IVPB 750 milliGRAM(s) IV Intermittent every 8 hours    MEDICATIONS  (PRN):  heparin   Injectable 5000 Unit(s) IV Push every 6 hours PRN For aPTT less than 40  heparin   Injectable 2500 Unit(s) IV Push every 6 hours PRN For aPTT between 40 - 57      PAST MEDICAL & SURGICAL HISTORY:  Hypertension      Diabetes      Dyslipidemia      CAD (Coronary Artery Disease)  with Stents in 06/2009 and 6/2019, s/p off-pump C3L on 8/13/20      Hypothyroidism      CVA (cerebral vascular accident)  12/13/19 with residual bilateral weakness      Anemia      PEG (percutaneous endoscopic gastrostomy) status  removed July 2020      Intubation of airway performed without difficulty  Dec 2019  CVA c/b status epilepticus requiring intubation and PEG in 12/2019-      ESRD on dialysis  M/W/F      Seizures  after CVA, last seizure was last week 4/07/22      History of insertion of stent into coronary artery bypass graft  2009 and 2019      S/P CABG x 3  off pump C3L on 8/13/20          Vital Signs Last 24 Hrs  T(C): 36.6 (11 Jun 2022 11:00), Max: 36.6 (10 Raffi 2022 15:55)  T(F): 97.9 (11 Jun 2022 11:00), Max: 97.9 (11 Jun 2022 11:00)  HR: 66 (11 Jun 2022 13:00) (47 - 69)  BP: 136/98 (11 Jun 2022 13:00) (123/60 - 168/75)  BP(mean): 112 (11 Jun 2022 13:00) (87 - 112)  RR: 24 (11 Jun 2022 13:00) (16 - 27)  SpO2: 100% (11 Jun 2022 13:00) (97% - 100%)    I&O's Summary    10 Raffi 2022 07:01  -  11 Jun 2022 07:00  --------------------------------------------------------  IN: 576.3 mL / OUT: 2500 mL / NET: -1923.7 mL    11 Jun 2022 07:01  -  11 Jun 2022 14:05  --------------------------------------------------------  IN: 55.2 mL / OUT: 600 mL / NET: -544.8 mL                              7.6    6.48  )-----------( 261      ( 11 Jun 2022 10:29 )             23.0     06-11    141  |  109<H>  |  64<H>  ----------------------------<  117<H>  4.0   |  22  |  1.87<H>    Ca    8.4      11 Jun 2022 10:29  Phos  3.5     06-11  Mg     1.8     06-11    TPro  5.6<L>  /  Alb  2.5<L>  /  TBili  0.3  /  DBili  x   /  AST  12  /  ALT  11  /  AlkPhos  88  06-11          Culture - Body Fluid with Gram Stain (collected 06-11-22 @ 02:42)  Source: .Body Fluid Pleural Fluid  Gram Stain (06-11-22 @ 12:29):    Few polymorphonuclear leukocytes seen    No organisms seen        Review of systems  Gen: No weight changes, fatigue, fevers/chills, weakness  Skin: No rashes  Head/Eyes/Ears/Mouth: No headache; Normal hearing; Normal vision w/o blurriness; No sinus pain/discomfort, sore throat  Respiratory: No dyspnea, cough, wheezing, hemoptysis  CV: No chest pain, PND, orthopnea  GI: Mild abdominal pain at surgical incision site; denies diarrhea, constipation, nausea, vomiting, melena, hematochezia  : No increased frequency, dysuria, hematuria, nocturia  MSK: No joint pain/swelling; no back pain; no edema  Neuro: No dizziness/lightheadedness, weakness, seizures, numbness, tingling  Heme: No easy bruising or bleeding  Endo: No heat/cold intolerance  Psych: No significant nervousness, anxiety, stress, depression  All other systems were reviewed and are negative, except as noted.      PHYSICAL EXAM:  Constitutional: Well developed / well nourished  Eyes: Anicteric, PERRLA  ENMT: nc/at  Neck: central line *****************  Respiratory: CTA B/L  Cardiovascular: RRR  Gastrointestinal: Soft, non distended, mild tenderness at the incision site; incision c/d/i; ALINA .....   Genitourinary: Urinary catheter in place*****Voiding spontaneously  Extremities: SCD's in place and working bilaterally, AVF.....  Vascular: Palpable dp pulses bilaterally  Neurological: A&O x3  Skin: no rashes, ulcerations or lesions;  Musculoskeletal: Moving all extremities  Psychiatric: Responsive     Transplant Surgery - Consult Note  --------------------------------------------------------------  DCD DDRT     Date: 4/21/22    Present:   Patient seen and examined with multidisciplinary team including Transplant Surgeon: Dr. Davis, Transplant Nephrologist: Dr. Nova, NP Jose and unit RN during am rounds.  Disciplines not in attendance will be notified of the plan.     HPI: 66 yr old man with ESRD due to DM was on HD since 2019, s/p DCD DDRT on 4/21/22.  Donor was 58 , KDPI 82%, DCD, single vessels and ureter, HLA mismatch 1, 2, 2. No DSA, cPRA 0%. 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   Readmitted in May for anemia in the setting of large perinephric hematoma s/p evacuation and repair of arterial anastomosis on 5/2/22. Intra operative biopsy showed no rejection, Creatinine ranging ~2mg/dL    PMH: DM type II, HTN, CAD s/p CABG in 2020, Afib on Eliquis, CVA in 2019 due to Afib, Seizure d/o following CVA last episode was on 4/8/22, h/o GI bleed in 2/2022 EGD with duodenal ulcer.      He was recently dx with klebsiella UTI rx with ertapenem - then switched to Levaquin day #6.    He also had parainfluenza pneumonia. Was at rehab progressing well.  Presented with status epilepticus. Intubated for respiratory protection.     Interval Events:  - Intubated on vent support. Sedated on Propofol  - On Continuous EEG. No seizure recorded.  Seizure meds adjusted  - As per neuro; Status epilepticus likely provoked in the setting of UTI and insufficient AED coverage, with possible decreased seizure threshold in the setting of known focal motor seizures.  - Hemodynamically stable/afebrile  -  Hgb 6.8. Receiving 1 PRBC this morning  - On Cefepime for UTI.   BCx sent  - S/P R thoracentesis 1.3L drained      Immunosuppression:   Induction:        Thymoglobulin                                        Maintenance immunosuppression: Tacrolimus 2mg per ngt bid, MMF 500mg BID, Pred 5  Ongoing monitoring for signs of rejection.    Potential Discharge date: pending clinical improvement    Education:  Medications    Plan of care:  See Below    MEDICATIONS  (STANDING):  amLODIPine   Tablet 5 milliGRAM(s) Oral daily  atorvastatin 40 milliGRAM(s) Oral at bedtime  carvedilol 6.25 milliGRAM(s) Oral every 12 hours  cefepime   IVPB 1000 milliGRAM(s) IV Intermittent every 8 hours  cefepime   IVPB      chlorhexidine 0.12% Liquid 15 milliLiter(s) Oral Mucosa every 12 hours  chlorhexidine 4% Liquid 1 Application(s) Topical <User Schedule>  fluconAZOLE   40 mG/mL Suspension 200 milliGRAM(s) Oral daily  heparin  Infusion.  Unit(s)/Hr (11 mL/Hr) IV Continuous <Continuous>  insulin lispro (ADMELOG) corrective regimen sliding scale   SubCutaneous every 6 hours  levETIRAcetam  IVPB 250 milliGRAM(s) IV Intermittent every 12 hours  levothyroxine Injectable 40 MICROGram(s) IV Push at bedtime  magnesium oxide 400 milliGRAM(s) Oral three times a day with meals  mycophenolate mofetil Suspension 500 milliGRAM(s) Oral two times a day  pantoprazole  Injectable 40 milliGRAM(s) IV Push daily  predniSONE   Tablet 5 milliGRAM(s) Oral daily  propofol Infusion. 20 MICROgram(s)/kG/Min (8.71 mL/Hr) IV Continuous <Continuous>  tacrolimus    0.5 mG/mL Suspension 2 milliGRAM(s) Oral two times a day  trimethoprim   80 mG/sulfamethoxazole 400 mG 1 Tablet(s) Oral daily  valGANciclovir 50 mG/mL Oral Solution 450 milliGRAM(s) Oral <User Schedule>  valproate sodium  IVPB 750 milliGRAM(s) IV Intermittent every 8 hours    MEDICATIONS  (PRN):  heparin   Injectable 5000 Unit(s) IV Push every 6 hours PRN For aPTT less than 40  heparin   Injectable 2500 Unit(s) IV Push every 6 hours PRN For aPTT between 40 - 57      PAST MEDICAL & SURGICAL HISTORY:  Hypertension      Diabetes      Dyslipidemia      CAD (Coronary Artery Disease)  with Stents in 06/2009 and 6/2019, s/p off-pump C3L on 8/13/20      Hypothyroidism      CVA (cerebral vascular accident)  12/13/19 with residual bilateral weakness      Anemia      PEG (percutaneous endoscopic gastrostomy) status  removed July 2020      Intubation of airway performed without difficulty  Dec 2019  CVA c/b status epilepticus requiring intubation and PEG in 12/2019-      ESRD on dialysis  M/W/F      Seizures  after CVA, last seizure was last week 4/07/22      History of insertion of stent into coronary artery bypass graft  2009 and 2019      S/P CABG x 3  off pump C3L on 8/13/20          Vital Signs Last 24 Hrs  T(C): 36.6 (11 Jun 2022 11:00), Max: 36.6 (10 Raffi 2022 15:55)  T(F): 97.9 (11 Jun 2022 11:00), Max: 97.9 (11 Jun 2022 11:00)  HR: 66 (11 Jun 2022 13:00) (47 - 69)  BP: 136/98 (11 Jun 2022 13:00) (123/60 - 168/75)  BP(mean): 112 (11 Jun 2022 13:00) (87 - 112)  RR: 24 (11 Jun 2022 13:00) (16 - 27)  SpO2: 100% (11 Jun 2022 13:00) (97% - 100%)    I&O's Summary    10 Raffi 2022 07:01  -  11 Jun 2022 07:00  --------------------------------------------------------  IN: 576.3 mL / OUT: 2500 mL / NET: -1923.7 mL    11 Jun 2022 07:01  -  11 Jun 2022 14:05  --------------------------------------------------------  IN: 55.2 mL / OUT: 600 mL / NET: -544.8 mL                              7.6    6.48  )-----------( 261      ( 11 Jun 2022 10:29 )             23.0     06-11    141  |  109<H>  |  64<H>  ----------------------------<  117<H>  4.0   |  22  |  1.87<H>    Ca    8.4      11 Jun 2022 10:29  Phos  3.5     06-11  Mg     1.8     06-11    TPro  5.6<L>  /  Alb  2.5<L>  /  TBili  0.3  /  DBili  x   /  AST  12  /  ALT  11  /  AlkPhos  88  06-11          Culture - Body Fluid with Gram Stain (collected 06-11-22 @ 02:42)  Source: .Body Fluid Pleural Fluid  Gram Stain (06-11-22 @ 12:29):    Few polymorphonuclear leukocytes seen    No organisms seen        Review of systems  Unable to obtain   Intubated/Sedated      PHYSICAL EXAM:  Constitutional: Well developed / well nourished  Eyes: Anicteric, PERRLA  ENMT: nc/at  Neck: supple  Respiratory: CTA B/L  Cardiovascular: RRR  Gastrointestinal: Soft, non distended, NT, incision healed   Genitourinary: Urine draining via condom cath  Extremities: SCD's in place and working bilaterally  Vascular: Palpable dp pulses bilaterally  Neurological: sedated on propofol  Skin: no rashes, ulcerations or lesions  Musculoskeletal: Moving all extremities  Psychiatric: sedated

## 2022-06-11 NOTE — PHYSICAL THERAPY INITIAL EVALUATION ADULT - ADDITIONAL COMMENTS
Pt lives in a PH with his spouse and sons, +4 steps to enter with B/L HR. All needs met on one floor. PTA Pt was ambulating with R/W and some assist, required some assist with ADls from spouse. Son reports pt walks indoor w/o a device and st cane outdoors Pt lives in a PH with his spouse and sons, +4 steps to enter with B/L HR. All needs met on one floor. PTA Pt was ambulating with R/W and some assist, at rehab.

## 2022-06-11 NOTE — DIETITIAN INITIAL EVALUATION ADULT - REASON INDICATOR FOR ASSESSMENT
Consult received for pressure injury stage II or greater.  Information obtained from pt's family at bedside, EMR.

## 2022-06-11 NOTE — CONSULT NOTE ADULT - CRITICAL CARE ATTENDING COMMENT
patient seen in MICU  suspected acute hemorrhage into right chest cavity based on chest x ray and on acute drop in hematocrit  brought to the SICU for further monitoring  facilitated administration of PRBC and blood products  reversed effect of heparin with Protamine  coordinated care with family and primary teams  ultimately required intubation for lethargy and inability to protect airway  required vasopressor support for hemorrhagic shock  showed good response to blood transfusions

## 2022-06-11 NOTE — DIETITIAN INITIAL EVALUATION ADULT - PERTINENT MEDS FT
MEDICATIONS  (STANDING):  amLODIPine   Tablet 5 milliGRAM(s) Oral daily  atorvastatin 40 milliGRAM(s) Oral at bedtime  carvedilol 6.25 milliGRAM(s) Oral every 12 hours  cefepime   IVPB 1000 milliGRAM(s) IV Intermittent every 8 hours  cefepime   IVPB      chlorhexidine 0.12% Liquid 15 milliLiter(s) Oral Mucosa every 12 hours  chlorhexidine 4% Liquid 1 Application(s) Topical <User Schedule>  fluconAZOLE   40 mG/mL Suspension 200 milliGRAM(s) Oral daily  heparin  Infusion.  Unit(s)/Hr (11 mL/Hr) IV Continuous <Continuous>  insulin lispro (ADMELOG) corrective regimen sliding scale   SubCutaneous every 6 hours  levETIRAcetam  IVPB 250 milliGRAM(s) IV Intermittent every 12 hours  levothyroxine Injectable 40 MICROGram(s) IV Push at bedtime  magnesium oxide 400 milliGRAM(s) Oral three times a day with meals  mycophenolate mofetil Suspension 500 milliGRAM(s) Oral two times a day  pantoprazole  Injectable 40 milliGRAM(s) IV Push daily  predniSONE   Tablet 5 milliGRAM(s) Oral daily  propofol Infusion. 20 MICROgram(s)/kG/Min (8.71 mL/Hr) IV Continuous <Continuous>  tacrolimus    0.5 mG/mL Suspension 2 milliGRAM(s) Oral two times a day  trimethoprim   80 mG/sulfamethoxazole 400 mG 1 Tablet(s) Oral daily  valGANciclovir 50 mG/mL Oral Solution 450 milliGRAM(s) Oral <User Schedule>  valproate sodium  IVPB 750 milliGRAM(s) IV Intermittent every 8 hours    MEDICATIONS  (PRN):  heparin   Injectable 5000 Unit(s) IV Push every 6 hours PRN For aPTT less than 40  heparin   Injectable 2500 Unit(s) IV Push every 6 hours PRN For aPTT between 40 - 57

## 2022-06-11 NOTE — PROGRESS NOTE ADULT - ATTENDING COMMENTS
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      Neuro: No further seizures, was extubated earlier today given no further episodes and recovery of mental status will following commands in his native language. Continue home keppra dose and increased valproate dose per neuro.   CV: Stable. Hold DOAC in the setting of critically ill patient. Will use heparin gtt instead given ease to stop and reversibility. BP high, will start home meds slowly.   Respiratory: Pleural effusion drained overnight by the night ICU team given concern for parapneumonic effusion. Appears transudative. CPAP'd well. Extubated today.   Renal: s/p transplant. Hold mycophenolate in setting of infection. Check tacro levels. Transplant nephro following.   Endo: Synthroid.  ID: Cefepime, was on levaquin outpatient    Update: Repeat xray in the later afternoon demonstrating sudden worsening right effusion. Had cleared earlier after thoracentesis. Repeat Hb also noted to be dropping again after previous stability and response to 1 unit. Concern for hemothorax, thorax sx, sicu consulted. Heparin reversal, prbc transfusion.

## 2022-06-11 NOTE — PROGRESS NOTE ADULT - ASSESSMENT
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      Neuro   Complex Partial Status Epilepticus   -  6/10 EEG without reported seizures  - 6/10 AM on propofol 50 titrated off by morning rounds  -on keppra and valproic acid at home   -Valproic Acid 750 q8h  -Keppra 250 q12h  - CT head completed (P) results   - Neuro check PER ICU protocol   - F/u MRI if mental status not improved        CV   HTN  - Will continue to monitor SBP   - resumed amlodipine as amlodipine 5mg qd on 6/11. per son at bedside home dose of amlodipine 10mg qd causes lower extremity edema.       Afib   - continue with telemetry and rate control   - heparin gtt      Pulm   Hypoxic respiratory failure secondary s/p intubation   - Extubated 6/11 AM     GI   Oral pharyngeal Dysphagia secondary to intubation   - extubated  -will advance diet as tolerated        s/p Rt DDRT  - Transplant nephrology consulted.   - Continuing mycophenolate pending further discussion with transplant nephro may DC in setting of recent UTI/active infection although completed 6/7 days levaquin  - 6/10 Urinalysis negative nitrite negative LE negative bacteria.   -Miranda for strict I/O Vs bladder scan q 6   -Home medications reviewed Envarsus XR 3mg qd, prednisone 5mg qd, Mycophenolate mofetil 1000 BID    Endo   Hypothyroidism  - Check TSH   - Home dose levothyroxine 50mcg qd.   -While not tolerating PO convert to leevothyroxine IV 40mcg qd discussed with family  - f/u a1c   - ISS q 6 hr / hypoglycemia protocol     ID   Per Medication list (+) abt tx for UTI   - UA neg LE neg nitrite   - f/u MRSA swab  - f/u cultures and sensitivities    - Cefepime (6/10 -)  -Vanco by level (6/10 -)      VTE   Heparin Gtt                  66 yr old Male with PMHx ESRD s/p permacath removal 5/10/22 s/p Rt DDRT 4/21/22,  CVA (2019), Afib on apixaban, seizure activity, CAD s/p stents, CABG (2020), HTN, HLD, DM on insulin, gastric/duodenal ulcer, recent hospitalization 4/28/22 -5/10/22 with weakness/anemia found to have perinephric hematoma s/p repair and evacuation intubated for airway protection in setting of 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      Neuro   Complex Partial Status Epilepticus   -  6/10 EEG without reported seizures  - 6/10 AM on propofol 50 titrated off by morning rounds  -on keppra 250 BID and divalproex 500/250/500 at home   -Valproic Acid 750 q8h  -Keppra 250 q12h  - CT head completed (P) results   - Neuro check PER ICU protocol   - F/u MRI if mental status not improved        CV   HTN  - Will continue to monitor SBP   - resumed amlodipine as amlodipine 5mg qd on 6/11. per son at bedside home dose of amlodipine 10mg qd causes lower extremity edema.       Afib   - continue with telemetry and rate control   - heparin gtt      Pulm   Hypoxic respiratory failure secondary s/p intubation   - Extubated 6/11 AM     GI   Oral pharyngeal Dysphagia secondary to intubation   - extubated  -will advance diet as tolerated        s/p Rt DDRT  - Transplant nephrology consulted.   - Continuing mycophenolate pending further discussion with transplant nephro may DC in setting of recent UTI/active infection although completed 6/7 days levaquin  - 6/10 Urinalysis negative nitrite negative LE negative bacteria.   -Miranda for strict I/O Vs bladder scan q 6   -Home medications reviewed Envarsus XR 3mg qd, prednisone 5mg qd, Mycophenolate mofetil 500 BID from 1000 BID recently reduced     Endo   Hypothyroidism  - Check TSH   - Home dose levothyroxine 50mcg qd.   -While not tolerating PO convert to leevothyroxine IV 40mcg qd discussed with family  - f/u a1c   - ISS q 6 hr / hypoglycemia protocol     ID   Per Medication list (+) abt tx for UTI   - UA neg LE neg nitrite   - f/u MRSA swab  - f/u cultures and sensitivities    - Cefepime (6/10 -)  -Vanco by level (6/10 -)      VTE   Heparin Gtt                  66 yr old Male with PMHx ESRD s/p permacath removal 5/10/22 s/p Rt DDRT 4/21/22,  CVA (2019), Afib on apixaban, seizure activity, CAD s/p stents, CABG (2020), HTN, HLD, DM on insulin, gastric/duodenal ulcer, recent hospitalization 4/28/22 -5/10/22 with weakness/anemia found to have perinephric hematoma s/p repair and evacuation intubated for airway protection in setting of 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      Neuro   Complex Partial Status Epilepticus   -  6/10 EEG without reported seizures  - 6/10 AM on propofol 50 titrated off by morning rounds  -on keppra 250 BID and divalproex 500/250/500 at home   -Valproic Acid 750 q8h  -Keppra 250 q12h  - CT head completed (P) results   - Neuro check PER ICU protocol   - F/u MRI if mental status not improved        CV   HTN  - Will continue to monitor SBP   - resumed amlodipine as amlodipine 5mg qd on 6/11. per son at bedside home dose of amlodipine 10mg qd causes lower extremity edema.       Afib   - continue with telemetry and rate control   - heparin gtt.       Pulm   Hypoxic respiratory failure secondary s/p intubation   - Extubated 6/11 AM     GI   Oral pharyngeal Dysphagia secondary to intubation   - extubated  -will advance diet as tolerated        s/p Rt DDRT  - Transplant nephrology consulted.   - Continuing mycophenolate pending further discussion with transplant nephro may DC in setting of recent UTI/active infection although completed 6/7 days levaquin  - 6/10 Urinalysis negative nitrite negative LE negative bacteria.   -Miranda for strict I/O Vs bladder scan q 6   -Home medications reviewed Envarsus XR 3mg qd, prednisone 5mg qd, Mycophenolate mofetil 500 BID from 1000 BID recently reduced     Endo   Hypothyroidism  - Check TSH   - Home dose levothyroxine 50mcg qd.   -While not tolerating PO convert to leevothyroxine IV 40mcg qd discussed with family  - f/u a1c   - ISS q 6 hr / hypoglycemia protocol     ID   Per Medication list (+) abt tx for UTI   - UA neg LE neg nitrite   - f/u MRSA swab  - f/u cultures and sensitivities    - Cefepime (6/10 -)  -Vanco by level (6/10 -)      VTE   Heparin Gtt

## 2022-06-11 NOTE — DIETITIAN INITIAL EVALUATION ADULT - ORAL INTAKE PTA/DIET HISTORY
Information obtained from marjorie at bedside - pt recently extubated, undergoing bedside imaging at time of RD visit. Per marjorie, pt previously at [facility], consuming ~50% of meals there due to dislike of meals. Drinking Nepro twice daily. Per marjorie intake was better when previously admitted at [facility] (discharged 5/10). Per previous RD note, pt eating well during latest admission.

## 2022-06-11 NOTE — EEG REPORT - NS EEG TEXT BOX
[facility]  Comprehensive Epilepsy Center  Report of Continuous Video EEG    [facility]: [address], [place] [address], Phone [phone]  [place] Office: [address], [address], [place] [address] Phone [phone]    [facility]: [address] [place] [address], Phone [phone]  [place] Office: [address] [place] [address], Phone [phone]    Patient Name: Srinivasan Castelan    Age: 66 year, : 1955  Patient ID: -, MRN #: -, Amin: MICU [id number] [id number]  Referring Physician: -  EEG #: [id number]    Study Time/Date: 8:00PM on 6/10/2022  	  End Time/Date: 0800 on 2022          			   Duration: 12H    Study Information:    EEG Recording Technique:  The patient underwent continuous Video-EEG monitoring, using Telemetry System hardware on the XLTek Digital System. EEG and video data were stored on a computer hard drive with important events saved in digital archive files. The material was reviewed by a physician (electroencephalographer / epileptologist) on a daily basis. Ramon and seizure detection algorithms were utilized and reviewed. An EEG Technician attended to the patient, and was available throughout daytime work hours.  The epilepsy center neurologist was available in person or on call 24-hours per day.    EEG Placement and Labeling of Electrodes:  The EEG was performed utilizing 20 channel referential EEG connections (coronal over temporal over parasagittal montage) using all standard 10-20 electrode placements with additional electrodes placed in the inferior temporal region using the modified 10-10 montage electrode placements for elective admissions, or if deemed necessary. Recording was at a sampling rate of 256 samples per second per channel. Time synchronized digital video recording was done simultaneously with EEG recording. A low light infrared camera was used for low light recording.     History:   VEEG Performed bedside  COR: Sedated, intubated  No HV due to COVID Protocol  No PHOTIC due to ICU   65 Y/O Female  P/W: R/O Seizure  H/O: Seizures, PEG, Anemia, CVA, CAD, DM, HTN, Hypothyroidism      Pertinent Medication  Diprivan  Ativan  Depacon  Keppra    Interpretation:    Daily EEG Visual Analysis  Findings: The background was continuous and somewhat reactive at times.   No posterior dominant rhythm seen.  Background predominantly consisted of theta, delta and faster activities.    Focal Slowing:   None were present.  Intermittent theta/delta slowing over the left fronto-temporal region, sharply contoured at times.    Sleep Background:  Absence of state change.    Other Non-Epileptiform Findings:  None were present.    Interictal Epileptiform Activity:   None were present.    Events:  Clinical events: None recorded.  Seizures: None recorded.    Activation Procedures:   Hyperventilation was not performed.    Photic stimulation was not performed.     Artifacts:  Intermittent myogenic and movement artifacts were noted.    EEG Summary / Classification:  Abnormal EEG   - Left fronto-temporal slowing  - Moderate generalized slowing.    EEG Impression / Clinical Correlate:  Abnormal EEG study.  Structural or functional abnormality in the left fronto-temporal region  Moderate to severe nonspecific diffuse or multifocal cerebral dysfunction.   No epileptiform pattern or seizure seen.    Gurwinder Carpenter MD  Attending Physician, Health system Epilepsy Center

## 2022-06-11 NOTE — CONSULT NOTE ADULT - ASSESSMENT
Mr. Castelan is a 66 yr old man with PMHx DM type II, HTN, CAD s/p CABG in 2020, Afib on Eliquis, CVA in 2019 due to Afib, Seizure d/o following CVA last episode was on 4/8/22, h/o GI bleed in 2/2022 EGD with duodenal ulcer,     ESRD s/p DDRT on 4/21/22 (course complicated by DGF, was on HD until 4/29/22, large perinephric hematoma s/p evacuation and repair of arterial anastomosis on 5/2/22) presented from rehab 6/10 with status epilepticus secondary to likely UTI, intubated for airway protection, s/p right thoracentesis complicated by hemothorax.    Recommendations  - no acute surgical intervention indicated  - will place large bore chest tube once coagulopathy is reversed    discussed with attending, Dr. Theresa Stockton, PGY2  Thoracic Surgery

## 2022-06-11 NOTE — DIETITIAN INITIAL EVALUATION ADULT - REASON FOR ADMISSION
Pt is a 66 yr old Male with PMHx ESRD s/p permacath removal 5/10/22 s/p Rt DDRT 4/21/22, CVA (2019), Afib on apixaban, seizure activity, CAD s/p stents, CABG (2020), HTN, HLD, DM on insulin, gastric/duodenal ulcer, recent hospitalization 4/28/22 -5/10/22 with weakness/anemia found to have perinephric hematoma s/p repair and evacuation intubated for airway protection in setting of 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

## 2022-06-11 NOTE — DIETITIAN INITIAL EVALUATION ADULT - ENTERAL
If EN warranted/pt unable to tolerate PO - recommend feeds of Glucerna 1.5 with goal rate of 70ml/hr x 18hr to provide 1260ml total volume, 1890kcal, 104g protein, 956ml free water. Regimen to meet 30.6kcal/kg, 1.7g/kg protein based on dosing wt 61.7kg. Defer fluid needs to team.

## 2022-06-11 NOTE — PROGRESS NOTE ADULT - SUBJECTIVE AND OBJECTIVE BOX
Stevo Sifuentes MD  Internal Medicine  Pager #[phone]    CHIEF COMPLAINT:Patient is a 66y old  Male who presents with a chief complaint of Status Epilepticus (10 Raffi 2022 20:33)        Interval Events:    REVIEW OF SYSTEMS:      OBJECTIVE:  ICU Vital Signs Last 24 Hrs  T(C): 36 (2022 04:00), Max: 36.6 (10 Raffi 2022 15:55)  T(F): 96.8 (2022 04:00), Max: 97.8 (10 Raffi 2022 15:55)  HR: 55 (2022 07:00) (47 - 66)  BP: 166/72 (2022 07:00) (123/60 - 168/75)  BP(mean): 104 (2022 07:00) (87 - 108)  ABP: --  ABP(mean): --  RR: 21 (2022 07:00) (16 - 27)  SpO2: 100% (2022 07:00) (97% - 100%)    Mode: AC/ CMV (Assist Control/ Continuous Mandatory Ventilation), RR (machine): 20, TV (machine): 400, FiO2: 30, PEEP: 5, ITime: 1, MAP: 10, PIP: 24    06-10 @ 07:01  -  06-11 @ 07:00  --------------------------------------------------------  IN: 576.3 mL / OUT: 2500 mL / NET: -1923.7 mL      CAPILLARY BLOOD GLUCOSE      POCT Blood Glucose.: 192 mg/dL (2022 06:12)      PHYSICAL EXAM:      LINES:    HOSPITAL MEDICATIONS:  Standing Meds:  atorvastatin 40 milliGRAM(s) Oral at bedtime  carvedilol 6.25 milliGRAM(s) Oral every 12 hours  cefepime   IVPB 1000 milliGRAM(s) IV Intermittent every 8 hours  cefepime   IVPB      chlorhexidine 0.12% Liquid 15 milliLiter(s) Oral Mucosa every 12 hours  chlorhexidine 4% Liquid 1 Application(s) Topical <User Schedule>  fluconAZOLE   40 mG/mL Suspension 200 milliGRAM(s) Oral daily  insulin lispro (ADMELOG) corrective regimen sliding scale   SubCutaneous every 6 hours  levETIRAcetam  IVPB 250 milliGRAM(s) IV Intermittent every 12 hours  levothyroxine Injectable 25 MICROGram(s) IV Push at bedtime  magnesium oxide 400 milliGRAM(s) Oral three times a day with meals  mycophenolate mofetil Suspension 500 milliGRAM(s) Oral two times a day  pantoprazole  Injectable 40 milliGRAM(s) IV Push daily  predniSONE   Tablet 5 milliGRAM(s) Oral daily  propofol Infusion. 20 MICROgram(s)/kG/Min IV Continuous <Continuous>  tacrolimus    0.5 mG/mL Suspension 2 milliGRAM(s) Oral two times a day  trimethoprim   80 mG/sulfamethoxazole 400 mG 1 Tablet(s) Oral daily  valGANciclovir 50 mG/mL Oral Solution 450 milliGRAM(s) Oral <User Schedule>  valproate sodium  IVPB 750 milliGRAM(s) IV Intermittent every 8 hours      PRN Meds:      LABS:                        6.8    4.11  )-----------( 246      ( 2022 02:04 )             20.7     Hgb Trend: 6.8<--, 6.8<--, 8.9<--  06-11    138  |  108  |  69<H>  ----------------------------<  198<H>  4.6   |  18<L>  |  1.90<H>    Ca    8.4      2022 01:29  Phos  3.6     06-11  Mg     1.8     06-11    TPro  5.6<L>  /  Alb  2.2<L>  /  TBili  0.2  /  DBili  x   /  AST  16  /  ALT  11  /  AlkPhos  92  06-11    Creatinine Trend: 1.90<--, 1.84<--  PT/INR - ( 2022 01:29 )   PT: 17.6 sec;   INR: 1.52 ratio         PTT - ( 2022 01:29 )  PTT:37.2 sec  Urinalysis Basic - ( 10 Raffi 2022 22:28 )    Color: Light Yellow / Appearance: Clear / S.016 / pH: x  Gluc: x / Ketone: Negative  / Bili: Negative / Urobili: Negative   Blood: x / Protein: Trace / Nitrite: Negative   Leuk Esterase: Negative / RBC: 25 /hpf / WBC 5 /HPF   Sq Epi: x / Non Sq Epi: 1 /hpf / Bacteria: Negative      Arterial Blood Gas:   @ 01:19  7.42/35/188/23/98.1/-1.6  ABG lactate: --  Arterial Blood Gas:  06-10 @ 22:24  7.45/33/187/23/99.2/-0.9  ABG lactate: --    Venous Blood Gas:  06-10 @ 15:50  7.26/54/66/24/91.3  VBG Lactate: 1.3      MICROBIOLOGY:     RADIOLOGY:  [ ] Reviewed and interpreted by me    EKG:     Stevo Sifuentes MD  Internal Medicine  Pager #[phone]    CHIEF COMPLAINT:Patient is a 66y old  Male who presents with a chief complaint of Status Epilepticus (10 Raffi 2022 20:33)        Interval Events:    Collateral obtained from son, interventional cardiologist, at bedside- prior stroke history with seizure activity around the time of stroke. In the past patient had been on vimpat. Recently completing     REVIEW OF SYSTEMS:      OBJECTIVE:  ICU Vital Signs Last 24 Hrs  T(C): 36 (2022 04:00), Max: 36.6 (10 Raffi 2022 15:55)  T(F): 96.8 (2022 04:00), Max: 97.8 (10 Raffi 2022 15:55)  HR: 55 (2022 07:00) (47 - 66)  BP: 166/72 (2022 07:00) (123/60 - 168/75)  BP(mean): 104 (2022 07:00) (87 - 108)  ABP: --  ABP(mean): --  RR: 21 (2022 07:00) (16 - 27)  SpO2: 100% (2022 07:00) (97% - 100%)    Mode: AC/ CMV (Assist Control/ Continuous Mandatory Ventilation), RR (machine): 20, TV (machine): 400, FiO2: 30, PEEP: 5, ITime: 1, MAP: 10, PIP: 24    06-10 @ 07:01  -  06-11 @ 07:00  --------------------------------------------------------  IN: 576.3 mL / OUT: 2500 mL / NET: -1923.7 mL      CAPILLARY BLOOD GLUCOSE      POCT Blood Glucose.: 192 mg/dL (2022 06:12)      PHYSICAL EXAM:      LINES:    HOSPITAL MEDICATIONS:  Standing Meds:  atorvastatin 40 milliGRAM(s) Oral at bedtime  carvedilol 6.25 milliGRAM(s) Oral every 12 hours  cefepime   IVPB 1000 milliGRAM(s) IV Intermittent every 8 hours  cefepime   IVPB      chlorhexidine 0.12% Liquid 15 milliLiter(s) Oral Mucosa every 12 hours  chlorhexidine 4% Liquid 1 Application(s) Topical <User Schedule>  fluconAZOLE   40 mG/mL Suspension 200 milliGRAM(s) Oral daily  insulin lispro (ADMELOG) corrective regimen sliding scale   SubCutaneous every 6 hours  levETIRAcetam  IVPB 250 milliGRAM(s) IV Intermittent every 12 hours  levothyroxine Injectable 25 MICROGram(s) IV Push at bedtime  magnesium oxide 400 milliGRAM(s) Oral three times a day with meals  mycophenolate mofetil Suspension 500 milliGRAM(s) Oral two times a day  pantoprazole  Injectable 40 milliGRAM(s) IV Push daily  predniSONE   Tablet 5 milliGRAM(s) Oral daily  propofol Infusion. 20 MICROgram(s)/kG/Min IV Continuous <Continuous>  tacrolimus    0.5 mG/mL Suspension 2 milliGRAM(s) Oral two times a day  trimethoprim   80 mG/sulfamethoxazole 400 mG 1 Tablet(s) Oral daily  valGANciclovir 50 mG/mL Oral Solution 450 milliGRAM(s) Oral <User Schedule>  valproate sodium  IVPB 750 milliGRAM(s) IV Intermittent every 8 hours      PRN Meds:      LABS:                        6.8    4.11  )-----------( 246      ( 2022 02:04 )             20.7     Hgb Trend: 6.8<--, 6.8<--, 8.9<--  06-11    138  |  108  |  69<H>  ----------------------------<  198<H>  4.6   |  18<L>  |  1.90<H>    Ca    8.4      2022 01:29  Phos  3.6     06-11  Mg     1.8     06-11    TPro  5.6<L>  /  Alb  2.2<L>  /  TBili  0.2  /  DBili  x   /  AST  16  /  ALT  11  /  AlkPhos  92  06-11    Creatinine Trend: 1.90<--, 1.84<--  PT/INR - ( 2022 01:29 )   PT: 17.6 sec;   INR: 1.52 ratio         PTT - ( 2022 01:29 )  PTT:37.2 sec  Urinalysis Basic - ( 10 Raffi 2022 22:28 )    Color: Light Yellow / Appearance: Clear / S.016 / pH: x  Gluc: x / Ketone: Negative  / Bili: Negative / Urobili: Negative   Blood: x / Protein: Trace / Nitrite: Negative   Leuk Esterase: Negative / RBC: 25 /hpf / WBC 5 /HPF   Sq Epi: x / Non Sq Epi: 1 /hpf / Bacteria: Negative      Arterial Blood Gas:   @ 01:19  7.42/35/188/23/98.1/-1.6  ABG lactate: --  Arterial Blood Gas:  06-10 @ 22:24  7.45/33/187/23/99.2/-0.9  ABG lactate: --    Venous Blood Gas:  06-10 @ 15:50  7.26/54/66/24/91.3  VBG Lactate: 1.3      MICROBIOLOGY:     RADIOLOGY:  [ ] Reviewed and interpreted by me    EKG:     Stevo Sifuentes MD  Internal Medicine  Pager #[phone]    CHIEF COMPLAINT:Patient is a 66y old  Male who presents with a chief complaint of Status Epilepticus (10 Raffi 2022 20:33)        Interval Events:    Collateral obtained from son, interventional cardiologist, at bedside- prior stroke history with seizure activity around the time of stroke. In the past patient had been on vimpat. Prior to presentation patient had been completing a course of levofloxacin abx (confirmed with med recc provided at bedside) for pansensitive klebsiella. Received a dose of IV ertapenem per paperwork provided by son 1g IV 22. Recently had cellcept decreased ?in setting of acute infection. On tacro, cellcept, and prednisone at home. Med recc provided by son at bedside. Initially with partial seizures in status epilepticus which was refractory to benzodiazepines 5 Versed and 2 ativan  in ED and complicated by drooling for which patient was intubated for airway protection. with prior episodes of similar drooling during seizure episodes.     REVIEW OF SYSTEMS:    Unable to assess due to intubated and sedated at time of prerounds.       OBJECTIVE:  ICU Vital Signs Last 24 Hrs  T(C): 36 (2022 04:00), Max: 36.6 (10 Raffi 2022 15:55)  T(F): 96.8 (2022 04:00), Max: 97.8 (10 Raffi 2022 15:55)  HR: 55 (2022 07:00) (47 - 66)  BP: 166/72 (2022 07:00) (123/60 - 168/75)  BP(mean): 104 (2022 07:00) (87 - 108)  ABP: --  ABP(mean): --  RR: 21 (2022 07:00) (16 - 27)  SpO2: 100% (2022 07:00) (97% - 100%)    Mode: AC/ CMV (Assist Control/ Continuous Mandatory Ventilation), RR (machine): 20, TV (machine): 400, FiO2: 30, PEEP: 5, ITime: 1, MAP: 10, PIP: 24    06-10 @ 07:01  -  06-11 @ 07:00  --------------------------------------------------------  IN: 576.3 mL / OUT: 2500 mL / NET: -1923.7 mL    General: NAD  Neurology: Sedated and Intubated   Eyes: PERRLA  HEENT: Neck supple, trachea midline, No JVD,  Respiratory: CTA B/L, No wheezing, rales, rhonchi diminished right base   CV: RRR, S1S2, no murmurs, rubs or gallops  Abdominal: Soft, NT, ND +BS  Extremities: No edema, + peripheral pulses  Skin: SKin tears    CAPILLARY BLOOD GLUCOSE      POCT Blood Glucose.: 192 mg/dL (2022 06:12)      PHYSICAL EXAM:      LINES:    HOSPITAL MEDICATIONS:  Standing Meds:  atorvastatin 40 milliGRAM(s) Oral at bedtime  carvedilol 6.25 milliGRAM(s) Oral every 12 hours  cefepime   IVPB 1000 milliGRAM(s) IV Intermittent every 8 hours  cefepime   IVPB      chlorhexidine 0.12% Liquid 15 milliLiter(s) Oral Mucosa every 12 hours  chlorhexidine 4% Liquid 1 Application(s) Topical <User Schedule>  fluconAZOLE   40 mG/mL Suspension 200 milliGRAM(s) Oral daily  insulin lispro (ADMELOG) corrective regimen sliding scale   SubCutaneous every 6 hours  levETIRAcetam  IVPB 250 milliGRAM(s) IV Intermittent every 12 hours  levothyroxine Injectable 25 MICROGram(s) IV Push at bedtime  magnesium oxide 400 milliGRAM(s) Oral three times a day with meals  mycophenolate mofetil Suspension 500 milliGRAM(s) Oral two times a day  pantoprazole  Injectable 40 milliGRAM(s) IV Push daily  predniSONE   Tablet 5 milliGRAM(s) Oral daily  propofol Infusion. 20 MICROgram(s)/kG/Min IV Continuous <Continuous>  tacrolimus    0.5 mG/mL Suspension 2 milliGRAM(s) Oral two times a day  trimethoprim   80 mG/sulfamethoxazole 400 mG 1 Tablet(s) Oral daily  valGANciclovir 50 mG/mL Oral Solution 450 milliGRAM(s) Oral <User Schedule>  valproate sodium  IVPB 750 milliGRAM(s) IV Intermittent every 8 hours      PRN Meds:      LABS:                        6.8    4.11  )-----------( 246      ( 2022 02:04 )             20.7     Hgb Trend: 6.8<--, 6.8<--, 8.9<--  11    138  |  108  |  69<H>  ----------------------------<  198<H>  4.6   |  18<L>  |  1.90<H>    Ca    8.4      2022 01:29  Phos  3.6       Mg     1.8     11    TPro  5.6<L>  /  Alb  2.2<L>  /  TBili  0.2  /  DBili  x   /  AST  16  /  ALT  11  /  AlkPhos  92      Creatinine Trend: 1.90<--, 1.84<--  PT/INR - ( 2022 01:29 )   PT: 17.6 sec;   INR: 1.52 ratio         PTT - ( 2022 01:29 )  PTT:37.2 sec  Urinalysis Basic - ( 10 Raffi 2022 22:28 )    Color: Light Yellow / Appearance: Clear / S.016 / pH: x  Gluc: x / Ketone: Negative  / Bili: Negative / Urobili: Negative   Blood: x / Protein: Trace / Nitrite: Negative   Leuk Esterase: Negative / RBC: 25 /hpf / WBC 5 /HPF   Sq Epi: x / Non Sq Epi: 1 /hpf / Bacteria: Negative      Arterial Blood Gas:   @ 01:19  7.42/35/188/23/98.1/-1.6  ABG lactate: --  Arterial Blood Gas:  06-10 @ 22:24  7.45/33/187/23/99.2/-0.9  ABG lactate: --    Venous Blood Gas:  06-10 @ 15:50  7.26/54/66/24/91.3  VBG Lactate: 1.3      MICROBIOLOGY:     RADIOLOGY:  [ ] Reviewed and interpreted by me    EKG:

## 2022-06-11 NOTE — CONSULT NOTE ADULT - SUBJECTIVE AND OBJECTIVE BOX
HISTORY OF PRESENT ILLNESS:  Patient currently denies headache, dizziness, weakness, fevers, chills, shortness of breath, chest pain, abdominal pain, or nausea/vomiting.    PAST MEDICAL HISTORY: Hypertension    Diabetes    Dyslipidemia    CAD (Coronary Artery Disease)    CAD (Coronary Artery Disease)    CRI (Chronic Renal Insufficiency)    Hypothyroidism    CVA (cerebral vascular accident)    Anemia    PEG (percutaneous endoscopic gastrostomy) status    Intubation of airway performed without difficulty    ESRD on dialysis    Seizures        PAST SURGICAL HISTORY: No significant past surgical history    History of insertion of stent into coronary artery bypass graft    S/P CABG x 3        HOME MEDICATIONS:    ALLERGIES: No Known Allergies      FAMILY HISTORY: No pertinent family history in first degree relatives    No pertinent family history in first degree relatives    No pertinent family history in first degree relatives    FH: HTN (hypertension)    No pertinent family history in first degree relatives    Family history of cancer of tongue (Father)        SOCIAL HISTORY:    REVIEW OF SYSTEMS:    VITAL SIGNS:  ICU Vital Signs Last 24 Hrs  T(C): 37.3 (11 Jun 2022 15:00), Max: 37.3 (11 Jun 2022 15:00)  T(F): 99.1 (11 Jun 2022 15:00), Max: 99.1 (11 Jun 2022 15:00)  HR: 72 (11 Jun 2022 18:00) (48 - 73)  BP: 115/55 (11 Jun 2022 18:00) (112/73 - 168/75)  BP(mean): 79 (11 Jun 2022 18:00) (79 - 112)  ABP: --  ABP(mean): --  RR: 25 (11 Jun 2022 18:00) (17 - 28)  SpO2: 97% (11 Jun 2022 18:00) (97% - 100%)      PHYSICAL EXAMINATION:  General - well-nourished, no acute distress  Neuro - awake, alert, oriented x4, no acute focal deficits  HEENT - normocephalic, PERRL, moist mucous membranes  Lungs - clear to auscultation bilaterally, right chest wall tenderness  Heart - regular rate and rhythm, S1S2 / irregularly irregular  Abdomen - soft, nontender, nondistended  Extremities - all four extremities are warm & pink with 2+ pulses, strength 5/5, sensation intact    LABS:                          5.7    9.64  )-----------( 262      ( 11 Jun 2022 18:49 )             17.5       06-11    141  |  109<H>  |  71<H>  ----------------------------<  197<H>  4.7   |  19<L>  |  2.17<H>    Ca    8.2<L>      11 Jun 2022 18:47  Phos  4.0     06-11  Mg     1.7     06-11    TPro  5.1<L>  /  Alb  2.2<L>  /  TBili  0.3  /  DBili  x   /  AST  13  /  ALT  9<L>  /  AlkPhos  77  06-11      PT/INR - ( 11 Jun 2022 01:29 )   PT: 17.6 sec;   INR: 1.52 ratio         PTT - ( 11 Jun 2022 18:47 )  PTT:102.9 sec    ABG - ( 11 Jun 2022 18:27 )  pH: 7.40  /  pCO2: 32    /  pO2: 63    / HCO3: 20    / Base Excess: -4.6  /  SaO2: 94.2    Lactate: x                  VBG - ( 10 Raffi 2022 15:50 )  pH: 7.26  /  pCO2: 54    /  pO2: 66    / HCO3: 24    / Base Excess: -3.0  /  SaO2: 91.3   Lactate: 1.3                RECENT CULTURES:  Specimen Source: .Body Fluid Pleural Fluid  Date/Time: 06-11 @ 02:42  Culture Results: --  Gram Stain:   Few polymorphonuclear leukocytes seen  No organisms seen  Organism: --      CAPILLARY BLOOD GLUCOSE      POCT Blood Glucose.: 194 mg/dL (11 Jun 2022 18:25)  POCT Blood Glucose.: 192 mg/dL (11 Jun 2022 06:12)      IMAGING STUDIES: SICU Consult Note    History of Present Illness  Mr. Castelan is a 66 yr old man with PMHx DM type II, HTN, CAD s/p CABG in 2020, Afib on Eliquis, CVA in 2019 due to Afib, Seizure d/o following CVA last episode was on 4/8/22, h/o GI bleed in 2/2022 EGD with duodenal ulcer,     ESRD s/p DDRT on 4/21/22 (course complicated by DGF, was on HD until 4/29/22, large perinephric hematoma s/p evacuation and repair of arterial anastomosis on 5/2/22) presented from rehab 6/10 with status epilepticus refractory to benzos. Patient was intubated for airway protection and transferred to MICU. He was loaded with valproic acid and continued on home keppra. EEG was negative and patient was extubated this morning. On admission to MICU patient noted to have right pleural effusion, thoracentesis performed yielding 1.3L serosanguinous fluid. Patient subsequently started on heparin gtt for afib. This morning patient had drop in h&h, given 1 pRBC with appropriate response however had subsequent drop again to 5.7/17 with supratherapuetic ptt. Afternoon CXR with opacification of right chest. SICU consulted for further management.     Upon arrival to SICU patient hypotensive, saturating 98% on room air with poor mental status. Right radial A line placed and bilateral access obtained. Patient intubated for airway protection. Patient reversed with protamine and given additional 2u pRBC and 2 FFP.    PAST MEDICAL HISTORY: Hypertension    Diabetes    Dyslipidemia    CAD (Coronary Artery Disease)    CAD (Coronary Artery Disease)    CRI (Chronic Renal Insufficiency)    Hypothyroidism    CVA (cerebral vascular accident)    Anemia    PEG (percutaneous endoscopic gastrostomy) status    Intubation of airway performed without difficulty    ESRD on dialysis    Seizures        PAST SURGICAL HISTORY: No significant past surgical history    History of insertion of stent into coronary artery bypass graft    S/P CABG x 3        HOME MEDICATIONS:    ALLERGIES: No Known Allergies      FAMILY HISTORY: No pertinent family history in first degree relatives    No pertinent family history in first degree relatives    No pertinent family history in first degree relatives    FH: HTN (hypertension)    No pertinent family history in first degree relatives    Family history of cancer of tongue (Father)        SOCIAL HISTORY:    REVIEW OF SYSTEMS:    VITAL SIGNS:  ICU Vital Signs Last 24 Hrs  T(C): 37.3 (11 Jun 2022 15:00), Max: 37.3 (11 Jun 2022 15:00)  T(F): 99.1 (11 Jun 2022 15:00), Max: 99.1 (11 Jun 2022 15:00)  HR: 72 (11 Jun 2022 18:00) (48 - 73)  BP: 115/55 (11 Jun 2022 18:00) (112/73 - 168/75)  BP(mean): 79 (11 Jun 2022 18:00) (79 - 112)  ABP: --  ABP(mean): --  RR: 25 (11 Jun 2022 18:00) (17 - 28)  SpO2: 97% (11 Jun 2022 18:00) (97% - 100%)      PHYSICAL EXAMINATION:  General - ill appearing, gurgling  Neuro - arousable, not responding appropriately; EEG leads in place  Lungs - breathing comfortably on room air  Heart - pulse regular   Abdomen - soft, nontender, nondistended; RLQ surgical scar well healed  Extremities - all four extremities are warm & pink    LABS:                          5.7    9.64  )-----------( 262      ( 11 Jun 2022 18:49 )             17.5       06-11    141  |  109<H>  |  71<H>  ----------------------------<  197<H>  4.7   |  19<L>  |  2.17<H>    Ca    8.2<L>      11 Jun 2022 18:47  Phos  4.0     06-11  Mg     1.7     06-11    TPro  5.1<L>  /  Alb  2.2<L>  /  TBili  0.3  /  DBili  x   /  AST  13  /  ALT  9<L>  /  AlkPhos  77  06-11      PT/INR - ( 11 Jun 2022 01:29 )   PT: 17.6 sec;   INR: 1.52 ratio         PTT - ( 11 Jun 2022 18:47 )  PTT:102.9 sec    ABG - ( 11 Jun 2022 18:27 )  pH: 7.40  /  pCO2: 32    /  pO2: 63    / HCO3: 20    / Base Excess: -4.6  /  SaO2: 94.2    Lactate: x                  VBG - ( 10 Raffi 2022 15:50 )  pH: 7.26  /  pCO2: 54    /  pO2: 66    / HCO3: 24    / Base Excess: -3.0  /  SaO2: 91.3   Lactate: 1.3                RECENT CULTURES:  Specimen Source: .Body Fluid Pleural Fluid  Date/Time: 06-11 @ 02:42  Culture Results: --  Gram Stain:   Few polymorphonuclear leukocytes seen  No organisms seen  Organism: --      CAPILLARY BLOOD GLUCOSE      POCT Blood Glucose.: 194 mg/dL (11 Jun 2022 18:25)  POCT Blood Glucose.: 192 mg/dL (11 Jun 2022 06:12)

## 2022-06-11 NOTE — DIETITIAN INITIAL EVALUATION ADULT - ETIOLOGY-BASIS
Problem: Patient Care Overview  Goal: Plan of Care/Patient Progress Review  Outcome: Improving  Unable to assess orientation, tends to cry out frequently during cares. Calm and cooperative throughout shift. VSS. Nonverbal pain scale utilized, no pain noted.  Right sided hemiplegia. Nonproductive cough noted, green sputum buildup in mouth, frequent oral cares as patient tolerates with swabs and suction. NPO, GJ tube intact with TF infusing 60cc/hr, free water flushes increased to 150cc every 3 hours.  Incontinent of urine & stool. Turn and repo every 2 hours. Plan: transition to oral antibiotics and discharge home tomorrow with RN home care.  Mother was at bedside today, RN discussed importance of frequent oral cares d/t aspiration pneumonia risk.  Mother plans to administer testosterone injection tomorrow at home after discharge.       Acute illness or injury

## 2022-06-11 NOTE — PHYSICAL THERAPY INITIAL EVALUATION ADULT - BALANCE TRAINING, PT EVAL
GOAL: Pt will maintain good dynamic sitting balance for 10mins to facilitate hygiene activities in 2 weeks.

## 2022-06-11 NOTE — CONSULT NOTE ADULT - ASSESSMENT
66 yr old man with h/o ESRD on HD s/p DDRT from DCD donor on 4/21/22 complicated by DGF requiring HD until 4/29/22. Now recovered, cr trending down.   H/o seizure d/o with last seizure episode on 4/8/22.   Recent dx of klebsiella UTI on antibiotics, [facility]. Valproic acid level was sub therapeutic.   Rx with Versed, Ativan and Keppra load. Intubated  for airway protection. S/p 1.3 liter fluid aspiration from right chest.   Currently on SBT trial  Electrolytes fair, good UOP, no signs of fluid overload     - Immunosuppression - resume Envarsus 3mg po daily once extubated and able to take PO. Obtain tacrolimus level 30min prior to AM dose.     Continue prednisone 5mg daily. Hold CellCept for now   - Infection prophylaxis - continue bactrim, Valcyte , fluconazole 200mg daily (on fluconazole after repair of arterial anastomosis)  - HTN : BP at goal, continue amlodipine and coreg   - Klebsiella UTI - continue Cefepime, f/u repeat cultures  - Anemia - s/p 1 unit PRBC, give epo 10,000 units sq x 1   - Seizure - on keppra and valproic acid , mx per ICU/Neurology

## 2022-06-11 NOTE — CONSULT NOTE ADULT - ASSESSMENT
66 yr old man with h/o DM type II, HTN, CAD s/p CABG in 2020, Afib on Eliquis, CVA in 2019 due to Afib, Seizure d/o (last episode was on 4/8/22), h/o GI bleed in 2/2022 EGD with duodenal ulcer and ESRD on HD s/p DDRT from [facility] donor on 4/21/22 complicated by DGF requiring HD until 4/29/22 now with downtrending creatinine who presented with status epilepticus in setting of UTI/antibiotics and sub-therapeutic valproic acid level     Seizure  - status epilepticus in setting of UTI/antibiotics and sub-therapeutic valproic acid level   - No seizure activity recorded on EEG  - On Keppra and Valproic acid, adjusted by Neuro  - Intubated for airway protection.  SBT trials/vent weaning as per MICU    DCD DDRT  - Creatinine downtrending  - Strict I/Os  - Immunosuppression: Continue Prograf 2mg suspension BID via NGT for now.  Resume Envarsus 3mg po at 8AM daily once extubated and able to take PO. Obtain tacrolimus level 30min prior to AM dose.     Continue prednisone 5mg daily. Hold CellCept for now   - Infection prophylaxis: continue bactrim, Valcyte, fluconazole 200mg daily (on fluconazole after repair of arterial anastomosis)  - Hgb 7.6  s/p 1 PRBC.  Give Procrit 10,000u x1 today    Klebsiella UTI  - Continue Cefepime  - FU Blood Cx sent 6/10    HTN   controlled  - Continue amlodipine and coreg     DM  -Fingersticks q6 hours while NPO with SSI coverage

## 2022-06-11 NOTE — DIETITIAN INITIAL EVALUATION ADULT - REASON
Nutrition focused physical exam deferred at this time as pt undergoing imaging/US. Pt appears thin with signs of moderate muscle loss to clavicles/shoulders, calf and thigh.

## 2022-06-11 NOTE — CONSULT NOTE ADULT - ASSESSMENT
Mr. Castelan is a 66 yr old man with PMHx DM type II, HTN, CAD s/p CABG in 2020, Afib on Eliquis, CVA in 2019 due to Afib, Seizure d/o following CVA last episode was on 4/8/22, h/o GI bleed in 2/2022 EGD with duodenal ulcer,     ESRD s/p DDRT on 4/21/22 (course complicated by DGF, was on HD until 4/29/22, large perinephric hematoma s/p evacuation and repair of arterial anastomosis on 5/2/22) presented from rehab 6/10 with status epilepticus secondary to likely UTI, intubated for airway protection, s/p thoracentesis complicated by hemothorax. SICU consulted for further management    Plan    Neurologic  h/o of CVA with seizure disorder, presented in status epilepticus secondary to likely UTI  - currently sedated with precedex  - loaded with valproate 6/11  - continue with valproate 750 q8, keppra 250 BID  - no seizure activity captured on EEG, will continue in AM    Respiratory  re-intubated 6/11 for airway protection, s/p right thoracentesis complicated by hemothorax  - on mechanical vent: PRVC 450/16/5/50%  - thoracic surgery consult, will place chest tube    Cardiovascular  hypotension secondary to large hemothorax  - responsive to resuscitation with blood products  - holding home antihypertensives  - lactate normal, will trend on hemorrhage watch    Gastrointestinal  no active issues  - NPO, will place OGT    Genitourinary  ESRD s/p DDRT 4/21/22 c/b delayed graft function, now no longer on HD  - tucker in place for strict I/Os  - recent klebsiella UTI treated with Ertapenem then Levaquin, UA on admission negative  - continue with immunosuppression per transplant (prednisone 5mg daily, tacro 2mg BID)    Hematologic  hemorrhage watch in the setting of large hemothorax, supratherapuetic ptt s/p reversal with protamine, given 3u pRBC and 2 FFP  - TEG with prolonged R time secondary to heparin gtt  - follow up coags prior to chest tube placement     Mr. Castelan is a 66 yr old man with PMHx DM type II, HTN, CAD s/p CABG in 2020, Afib on Eliquis, CVA in 2019 due to Afib, Seizure d/o following CVA last episode was on 4/8/22, h/o GI bleed in 2/2022 EGD with duodenal ulcer,     ESRD s/p DDRT on 4/21/22 (course complicated by DGF, was on HD until 4/29/22, large perinephric hematoma s/p evacuation and repair of arterial anastomosis on 5/2/22) presented from rehab 6/10 with status epilepticus secondary to likely UTI, intubated for airway protection, s/p thoracentesis complicated by hemothorax. SICU consulted for further management    Plan    Neurologic  h/o of CVA with seizure disorder, presented in status epilepticus secondary to likely UTI  - currently sedated with precedex  - loaded with valproate 6/11  - continue with valproate 750 q8, keppra 250 BID  - no seizure activity captured on EEG, will continue in AM    Respiratory  re-intubated 6/11 for airway protection, s/p right thoracentesis complicated by hemothorax  - on mechanical vent: PRVC 450/16/5/50%  - thoracic surgery consult, will place chest tube after coagulopathy is corrected    Cardiovascular  hypotension secondary to large hemothorax  - responsive to resuscitation with blood products  - holding home antihypertensives  - lactate normal, will trend on hemorrhage watch    Gastrointestinal  no active issues  - NPO, will place OGT  - protonix ppx    Genitourinary  ESRD s/p DDRT 4/21/22 c/b delayed graft function, now no longer on HD  - tucker in place for strict I/Os  - NS @75cc/hr  - recent klebsiella UTI treated with Ertapenem then Levaquin, UA on admission negative  - continue with immunosuppression per transplant (prednisone 5mg daily, tacro 2mg BID)    Hematologic  hemorrhage watch in the setting of large hemothorax, supratherapuetic ptt s/p reversal with protamine, given 3u pRBC and 2 FFP  - TEG with prolonged R time secondary to heparin gtt  - follow up coags prior to chest tube placement    Infectious Disease  - continue prophylactic Bactrim, Valcyte, Fluconazole  - continue cefepime  - monitor WBC, fevers    Endocrine  h/o DM  - continue ISS q6  - synthroid 40mg    Lines/Tubes/Drains:  - ETT  - Tucker  - right radial Dasia  - PIV x2    Dispo; will admit to SICU  Code Status: full code Mr. Castelan is a 66 yr old man with PMHx DM type II, HTN, CAD s/p CABG in 2020, Afib on Eliquis, CVA in 2019 due to Afib, Seizure d/o following CVA last episode was on 4/8/22, h/o GI bleed in 2/2022 EGD with duodenal ulcer,     ESRD s/p DDRT on 4/21/22 (course complicated by DGF, was on HD until 4/29/22, large perinephric hematoma s/p evacuation and repair of arterial anastomosis on 5/2/22) presented from rehab 6/10 with status epilepticus secondary to likely UTI, intubated for airway protection, s/p thoracentesis complicated by hemothorax. SICU consulted for further management    Plan    Neurologic  h/o of CVA with seizure disorder, presented in status epilepticus secondary to likely UTI  - currently sedated with precedex  - loaded with valproate 6/11  - continue with valproate 750 q8, keppra 250 BID  - no seizure activity captured on EEG, will continue in AM    Respiratory  re-intubated 6/11 for airway protection, s/p right thoracentesis complicated by hemothorax  - on mechanical vent: PRVC 450/16/5/50%  - thoracic surgery consult, will place chest tube after coagulopathy is corrected    Cardiovascular  hypotension secondary to large hemothorax  - responsive to resuscitation with blood products  - holding home antihypertensives  - lactate normal, will trend on hemorrhage watch    Gastrointestinal  no active issues  - NPO, will place OGT  - protonix ppx    Genitourinary  ESRD s/p DDRT 4/21/22 c/b delayed graft function, now no longer on HD  - tucker in place for strict I/Os  - NS @75cc/hr  - recent klebsiella UTI treated with Ertapenem then Levaquin, UA on admission negative  - continue with immunosuppression per transplant (prednisone 5mg daily, tacro 2mg BID)    Hematologic  hemorrhage watch in the setting of large hemothorax, supratherapuetic ptt s/p reversal with protamine, given 3u pRBC and 2 FFP  - TEG with prolonged R time secondary to heparin gtt  - follow up coags prior to chest tube placement  - hold anticoagulation    Infectious Disease  - continue prophylactic Bactrim, Valcyte, Fluconazole  - continue cefepime  - monitor WBC, fevers    Endocrine  h/o DM  - continue ISS q6  - synthroid 40mg    Lines/Tubes/Drains:  - ETT  - Tucker  - right radial Dasia  - PIV x2    Dispo; will admit to SICU  Code Status: full code

## 2022-06-11 NOTE — CONSULT NOTE ADULT - PROVIDER SPECIALTY LIST ADULT
Patient was called with lab results and dr hernandez's recommendations. Patient will call with any questions or concerns,   Transplant Nephrology

## 2022-06-11 NOTE — CONSULT NOTE ADULT - ATTENDING COMMENTS
66 y.o. with R pleural effusion complicated by hemothorax    will need CT chest once clinically stable

## 2022-06-11 NOTE — PHYSICAL THERAPY INITIAL EVALUATION ADULT - PERTINENT HX OF CURRENT PROBLEM, REHAB EVAL
67 yo M w/ PMHx ESRD s/p permacath removal 5/10/22 s/p Rt DDRT 4/21/22,  CVA (2019), AF, Sz activity, CAD s/p stents, CABG (2020), HTN, HLD, DM on insulin, gastric/duodenal ulcer, recent hospitalization 4/28/22 -5/10/22 with weakness/anemia.; Admit from [facility] for concern for status epilepticus requiring intubation for hypoxic respiratory failure & admited to MICU.; CTH (-) and 24hr EEG pending

## 2022-06-11 NOTE — CONSULT NOTE ADULT - SUBJECTIVE AND OBJECTIVE BOX
[facility] DIVISION OF KIDNEY DISEASES AND HYPERTENSION -- INITIAL CONSULT NOTE  --------------------------------------------------------------------------------  Authored by: Ozzie Nova   Cell # [phone]     HPI:  66 yr old man with ESRD due to DM was on HD since , s/p DCD DDRT on 22.  Donor was 58 , KDPI 82%, DCD, single vessels and ureter, HLA mismatch 1, 2, 2. No DSA, cPRA 0%. CMV +/+  Course complicated by DGF, was on HD until 22.   Readmitted in May for anemia in the setting of large perinephric hematoma s/p evacuation and repair of arterial anastomosis on 22. Intra operative biopsy showed no rejection,   Creatinine ranging 2mg/dL      PMH: DM type II, HTN, CAD s/p CABG in , Afib on Eliquis, CVA in  due to Afib, Seizure d/o following CVA last episode was on 22, h/o GI bleed in 2022 EGD with duodenal ulcer.      He was recently dx with klebsiella UTI rx with ertapenem - then switched to Levaquin day #6.   He also had parainfluenza pneumonia.   Was at rehab progressing well   Presented with status epilepticus. Intubated for respiratory protection.   Right pleural effusion drained 1300ml.   Transfused 1 unit PRBC       PAST HISTORY  --------------------------------------------------------------------------------  PAST MEDICAL & SURGICAL HISTORY:  Hypertension  Diabetes  ESRD   Dyslipidemia  CAD (Coronary Artery Disease) with Stents in 2009 and 2019, s/p off-pump C3L on 20  Hypothyroidism  CVA (cerebral vascular accident) 19 with residual bilateral weakness  Anemia  PEG (percutaneous endoscopic gastrostomy) status removed 2020  CVA c/b status epilepticus requiring intubation and PEG in 2019  Seizures after CVA, last seizure was last week 22    FAMILY HISTORY:  Family history of cancer of tongue (Father)  Parents are  . Father - at 80 years, tongue cancer was a smoker. Mother- at 71, had accident while crossing road. Siblings- 2 brothers and one sister.    Social History:  Was at [facility] (10 Raffi 2022 19:11)    ALLERGIES & MEDICATIONS  --------------------------------------------------------------------------------  Allergies  No Known Allergies    Home Medications:  amLODIPine 10 mg oral tablet: 1 tab(s) enteral once a day (10 Raffi 2022 21:00)  apixaban 2.5 mg oral tablet: 1 tab(s) orally 2 times a day (10 Raffi 2022 18:55)  atorvastatin 40 mg oral tablet: 1 tab(s) orally once a day (at bedtime) (10 Raffi 2022 18:55)  CellCept 500 mg oral tablet: 1 tab(s) orally 2 times a day (10 Raffi 2022 18:55)  Coreg 25 mg oral tablet: 1 tab(s) orally every 12 hours (10 Raffi 2022 18:55)  divalproex sodium 250 mg oral delayed release tablet: 1 tab(s) orally once a day (10 Raffi 2022 18:55)  divalproex sodium 500 mg oral delayed release tablet: 1 tab(s) orally 2 times a day (10 Raffi 2022 18:55)  Envarsus XR 1 mg oral tablet, extended release: 3 tab(s) by gastrostomy tube once a day (in the morning) (10 Raffi 2022 20:54)  fluconazole 200 mg oral tablet: 1 tab(s) orally once a day (10 Raffi 2022 18:55)  HumaLOG 100 units/mL injectable solution: 4 unit(s) injectable 3 times a day (10 Raffi 2022 18:55)  insulin glargine 100 units/mL subcutaneous solution: 6 unit(s) subcutaneous once a day (at bedtime) (10 Raffi 2022 18:55)  levETIRAcetam 250 mg oral tablet: 1 tab(s) orally 2 times a day (10 Raffi 2022 18:55)  levothyroxine 50 mcg (0.05 mg) oral tablet: 1 tab(s) orally once a day (10 Raffi 2022 18:55)  magnesium oxide 400 mg oral tablet: 1 tab(s) orally 3 times a day (with meals) (10 Raffi 2022 18:55)  pantoprazole 40 mg oral delayed release tablet: 1 tab(s) orally once a day (before a meal) (10 Raffi 2022 18:55)  predniSONE: 5 milligram(s) orally once a day (10 Raffi 2022 18:55)  senna oral tablet: 2 tab(s) orally once a day (at bedtime) (10 Raffi 2022 18:55)  sulfamethoxazole-trimethoprim 400 mg-80 mg oral tablet: 1 tab(s) orally once a day (10 Raffi 2022 18:55)  tacrolimus 4 mg oral tablet, extended release: 5 milligram(s) orally once a day (10 Raffi 2022 18:55)  valGANciclovir 450 mg oral tablet: 1 tab(s) orally 3 times a week (10 Raffi 2022 18:55)        Standing Inpatient Medications  amLODIPine   Tablet 5 milliGRAM(s) Oral daily  atorvastatin 40 milliGRAM(s) Oral at bedtime  carvedilol 6.25 milliGRAM(s) Oral every 12 hours  cefepime   IVPB 1000 milliGRAM(s) IV Intermittent every 8 hours   chlorhexidine 0.12% Liquid 15 milliLiter(s) Oral Mucosa every 12 hours  chlorhexidine 4% Liquid 1 Application(s) Topical <User Schedule>  fluconAZOLE   40 mG/mL Suspension 200 milliGRAM(s) Oral daily  heparin  Infusion.  Unit(s)/Hr IV Continuous <Continuous>  insulin lispro (ADMELOG) corrective regimen sliding scale   SubCutaneous every 6 hours  levETIRAcetam  IVPB 250 milliGRAM(s) IV Intermittent every 12 hours  levothyroxine Injectable 25 MICROGram(s) IV Push at bedtime  magnesium oxide 400 milliGRAM(s) Oral three times a day with meals  mycophenolate mofetil Suspension 500 milliGRAM(s) Oral two times a day  pantoprazole  Injectable 40 milliGRAM(s) IV Push daily  predniSONE   Tablet 5 milliGRAM(s) Oral daily  propofol Infusion. 20 MICROgram(s)/kG/Min IV Continuous <Continuous>  tacrolimus    0.5 mG/mL Suspension 2 milliGRAM(s) Oral two times a day  trimethoprim   80 mG/sulfamethoxazole 400 mG 1 Tablet(s) Oral daily  valGANciclovir 50 mG/mL Oral Solution 450 milliGRAM(s) Oral <User Schedule>  valproate sodium  IVPB 750 milliGRAM(s) IV Intermittent every 8 hours    PRN Inpatient Medications  heparin   Injectable 5000 Unit(s) IV Push every 6 hours PRN  heparin   Injectable 2500 Unit(s) IV Push every 6 hours PRN      REVIEW OF SYSTEMS  Unobtainable. Patient intubated     VITALS/PHYSICAL EXAM  --------------------------------------------------------------------------------  T(C): 36.6 (22 @ 11:00), Max: 36.6 (06-10-22 @ 15:55)  HR: 69 (22 @ 12:44) (47 - 69)  BP: 153/73 (22 @ 12:00) (123/60 - 168/75)  RR: 23 (22 @ 12:00) (16 - 27)  SpO2: 100% (22 @ 12:44) (97% - 100%)    Height (cm): 177.8 (06-10-22 @ 15:41)  Weight (kg): 61.7 (06-10-22 @ 18:31)  BMI (kg/m2): 19.5 (06-10-22 @ 18:31)  BSA (m2): 1.77 (06-10-22 @ 18:31)      06-10-22 @ 07:01  -  22 @ 07:00  --------------------------------------------------------  IN: 576.3 mL / OUT: 2500 mL / NET: -1923.7 mL    22 @ 07:01  -  22 @ 13:15  --------------------------------------------------------  IN: 55.2 mL / OUT: 600 mL / NET: -544.8 mL      Physical Exam:    Intubated, awake   soft abdomen  Miranda in place  No edema     LABS/STUDIES  --------------------------------------------------------------------------------              7.6    6.48  >-----------<  261      [22 @ 10:29]              23.0     141  |  109  |  64  ----------------------------<  117      [22 @ 10:29]  4.0   |  22  |  1.87        Ca     8.4     [22 10:29]      Mg     1.8     [22 10:29]      Phos  3.5     [22 10:29]    TPro  5.6  /  Alb  2.5  /  TBili  0.3  /  DBili  x   /  AST  12  /  ALT  11  /  AlkPhos  88  [22 10:29]    PT/INR: PT 17.6 , INR 1.52       [22 01:29]  PTT: 37.2       [22:29]    CK <10      [22 01:29]    Creatinine Trend:  SCr 1.87 [06-11 @ 10:29]  SCr 1.90 [:29]  SCr 1.84 [06-10 @ 16:19]    Urinalysis - [06-10-22 @ 22:28]      Color Light Yellow / Appearance Clear / SG 1.016 / pH 6.0      Gluc 100 mg/dL / Ketone Negative  / Bili Negative / Urobili Negative       Blood Small / Protein Trace / Leuk Est Negative / Nitrite Negative      RBC 25 / WBC 5 / Hyaline  / Gran  / Sq Epi  / Non Sq Epi 1 / Bacteria Negative    Urine Creatinine 38      [22 @ 09:32]  Urine Protein 18      [22 09:32]  Urine Sodium 50      [22 09:32]  Urine Potassium 10      [22 @ 09:32]  Urine Chloride <20      [22 @ 09:32]    Iron 17, TIBC 171, %sat 10      [22 @ 13:03]  Ferritin 1505      [22 @ 13:05]  PTH -- (Ca 9.2)      [22 @ 10:13]   294  HbA1c 6.0      [20 @ 08:53]  Lipid: chol 118, TG 54, HDL 59, LDL --      [21 @ 09:44]    HBsAb 38379.2      [22 @ 14:13]  HBsAg Nonreact      [22 @ 14:13]  HBcAb Reactive      [22 @ 14:13]  HCV 0.25, Nonreact      [22 @ 14:13]  HIV Nonreact      [22 @ 14:47]    Free Light Chains: kappa 22.34, lambda 13.02, ratio = 1.72      [ 09:29]  Immunofixation Serum:   No Monoclonal Band Identified    Reference Range: None Detected      [22 @ 09:29]  SPEP Interpretation: Normal Electrophoresis Pattern      [22 @ 22:50]

## 2022-06-11 NOTE — DIETITIAN INITIAL EVALUATION ADULT - PERTINENT LABORATORY DATA
06-11    141  |  109<H>  |  64<H>  ----------------------------<  117<H>  4.0   |  22  |  1.87<H>    Ca    8.4      11 Jun 2022 10:29  Phos  3.5     06-11  Mg     1.8     06-11    TPro  5.6<L>  /  Alb  2.5<L>  /  TBili  0.3  /  DBili  x   /  AST  12  /  ALT  11  /  AlkPhos  88  06-11  POCT Blood Glucose.: 192 mg/dL (06-11-22 @ 06:12)  A1C with Estimated Average Glucose Result: 6.6 % (04-24-22 @ 17:12)  A1C with Estimated Average Glucose Result: 7.3 % (02-04-22 @ 13:42)  A1C with Estimated Average Glucose Result: 7.2 % (02-04-22 @ 05:48)

## 2022-06-11 NOTE — CONSULT NOTE ADULT - SUBJECTIVE AND OBJECTIVE BOX
Thoracic Surgery Consult Surgery    History of Present Illness      PAST MEDICAL HISTORY: Hypertension    Diabetes    Dyslipidemia    CAD (Coronary Artery Disease)    CAD (Coronary Artery Disease)    CRI (Chronic Renal Insufficiency)    Hypothyroidism    CVA (cerebral vascular accident)    Anemia    PEG (percutaneous endoscopic gastrostomy) status    Intubation of airway performed without difficulty    ESRD on dialysis    Seizures        PAST SURGICAL HISTORY: No significant past surgical history    History of insertion of stent into coronary artery bypass graft    S/P CABG x 3        HOME MEDICATIONS:    ALLERGIES: No Known Allergies      FAMILY HISTORY: No pertinent family history in first degree relatives    No pertinent family history in first degree relatives    No pertinent family history in first degree relatives    FH: HTN (hypertension)    No pertinent family history in first degree relatives    Family history of cancer of tongue (Father)        SOCIAL HISTORY:    REVIEW OF SYSTEMS:    VITAL SIGNS:  ICU Vital Signs Last 24 Hrs  T(C): 36.5 (11 Jun 2022 20:30), Max: 37.3 (11 Jun 2022 15:00)  T(F): 97.7 (11 Jun 2022 20:30), Max: 99.1 (11 Jun 2022 15:00)  HR: 61 (11 Jun 2022 20:30) (48 - 73)  BP: 89/47 (11 Jun 2022 20:30) (89/47 - 168/75)  BP(mean): 64 (11 Jun 2022 20:30) (64 - 112)  ABP: --  ABP(mean): --  RR: 23 (11 Jun 2022 20:30) (17 - 28)  SpO2: 97% (11 Jun 2022 20:30) (97% - 100%)      PHYSICAL EXAMINATION:  General - well-nourished, no acute distress  Neuro - awake, alert, oriented x4, no acute focal deficits  HEENT - normocephalic, PERRL, moist mucous membranes  Lungs - clear to auscultation bilaterally, right chest wall tenderness  Heart - regular rate and rhythm, S1S2 / irregularly irregular  Abdomen - soft, nontender, nondistended  Extremities - all four extremities are warm & pink with 2+ pulses, strength 5/5, sensation intact    LABS:                          5.7    9.64  )-----------( 262      ( 11 Jun 2022 18:49 )             17.5       06-11    141  |  109<H>  |  71<H>  ----------------------------<  197<H>  4.7   |  19<L>  |  2.17<H>    Ca    8.2<L>      11 Jun 2022 18:47  Phos  4.0     06-11  Mg     1.7     06-11    TPro  5.1<L>  /  Alb  2.2<L>  /  TBili  0.3  /  DBili  x   /  AST  13  /  ALT  9<L>  /  AlkPhos  77  06-11      PT/INR - ( 11 Jun 2022 01:29 )   PT: 17.6 sec;   INR: 1.52 ratio         PTT - ( 11 Jun 2022 18:47 )  PTT:102.9 sec    ABG - ( 11 Jun 2022 20:55 )  pH: 7.38  /  pCO2: 30    /  pO2: 72    / HCO3: 18    / Base Excess: -6.8  /  SaO2: 97.3    Lactate: x                  VBG - ( 10 Raffi 2022 15:50 )  pH: 7.26  /  pCO2: 54    /  pO2: 66    / HCO3: 24    / Base Excess: -3.0  /  SaO2: 91.3   Lactate: 1.3                RECENT CULTURES:  Specimen Source: .Body Fluid Pleural Fluid  Date/Time: 06-11 @ 02:42  Culture Results: --  Gram Stain:   Few polymorphonuclear leukocytes seen  No organisms seen  Organism: --      CAPILLARY BLOOD GLUCOSE      POCT Blood Glucose.: 194 mg/dL (11 Jun 2022 18:25)  POCT Blood Glucose.: 192 mg/dL (11 Jun 2022 06:12)      IMAGING STUDIES: Thoracic Surgery Consult Surgery    History of Present Illness  Mr. Castelan is a 66 yr old man with PMHx DM type II, HTN, CAD s/p CABG in 2020, Afib on Eliquis, CVA in 2019 due to Afib, Seizure d/o following CVA last episode was on 4/8/22, h/o GI bleed in 2/2022 EGD with duodenal ulcer,     ESRD s/p DDRT on 4/21/22 (course complicated by DGF, was on HD until 4/29/22, large perinephric hematoma s/p evacuation and repair of arterial anastomosis on 5/2/22) presented from rehab 6/10 with status epilepticus refractory to benzos. Patient was intubated for airway protection and transferred to MICU. He was loaded with valproic acid and continued on home keppra. EEG was negative and patient was extubated this morning. On admission to MICU patient noted to have right pleural effusion, thoracentesis performed yielding 1.3L serosanguinous fluid. Patient subsequently started on heparin gtt for afib. This morning patient had drop in h&h, given 1 pRBC with appropriate response however had subsequent drop again to 5.7/17 with supratherapuetic ptt. Afternoon CXR with opacification of right chest. Thoracic consulted for management of hemothorax.  Patient transferred to SICU and intubated for airway protection. Patient reversed with protamine and given additional 2u pRBC and 2 FFP.    PAST MEDICAL HISTORY: Hypertension    Diabetes    Dyslipidemia    CAD (Coronary Artery Disease)    CAD (Coronary Artery Disease)    CRI (Chronic Renal Insufficiency)    Hypothyroidism    CVA (cerebral vascular accident)    Anemia    PEG (percutaneous endoscopic gastrostomy) status    Intubation of airway performed without difficulty    ESRD on dialysis    Seizures        PAST SURGICAL HISTORY: No significant past surgical history    History of insertion of stent into coronary artery bypass graft    S/P CABG x 3        HOME MEDICATIONS:    ALLERGIES: No Known Allergies      FAMILY HISTORY: No pertinent family history in first degree relatives    No pertinent family history in first degree relatives    No pertinent family history in first degree relatives    FH: HTN (hypertension)    No pertinent family history in first degree relatives    Family history of cancer of tongue (Father)        SOCIAL HISTORY:    REVIEW OF SYSTEMS:    VITAL SIGNS:  ICU Vital Signs Last 24 Hrs  T(C): 36.5 (11 Jun 2022 20:30), Max: 37.3 (11 Jun 2022 15:00)  T(F): 97.7 (11 Jun 2022 20:30), Max: 99.1 (11 Jun 2022 15:00)  HR: 61 (11 Jun 2022 20:30) (48 - 73)  BP: 89/47 (11 Jun 2022 20:30) (89/47 - 168/75)  BP(mean): 64 (11 Jun 2022 20:30) (64 - 112)  ABP: --  ABP(mean): --  RR: 23 (11 Jun 2022 20:30) (17 - 28)  SpO2: 97% (11 Jun 2022 20:30) (97% - 100%)      PHYSICAL EXAMINATION:  General - ill appearing, gurgling  Neuro - arousable, not responding appropriately; EEG leads in place  Lungs - breathing comfortably on room air  Heart - pulse regular   Abdomen - soft, nontender, nondistended; RLQ surgical scar well healed  Extremities - all four extremities are warm & pink      LABS:                          5.7    9.64  )-----------( 262      ( 11 Jun 2022 18:49 )             17.5       06-11    141  |  109<H>  |  71<H>  ----------------------------<  197<H>  4.7   |  19<L>  |  2.17<H>    Ca    8.2<L>      11 Jun 2022 18:47  Phos  4.0     06-11  Mg     1.7     06-11    TPro  5.1<L>  /  Alb  2.2<L>  /  TBili  0.3  /  DBili  x   /  AST  13  /  ALT  9<L>  /  AlkPhos  77  06-11      PT/INR - ( 11 Jun 2022 01:29 )   PT: 17.6 sec;   INR: 1.52 ratio         PTT - ( 11 Jun 2022 18:47 )  PTT:102.9 sec    ABG - ( 11 Jun 2022 20:55 )  pH: 7.38  /  pCO2: 30    /  pO2: 72    / HCO3: 18    / Base Excess: -6.8  /  SaO2: 97.3    Lactate: x                  VBG - ( 10 Raffi 2022 15:50 )  pH: 7.26  /  pCO2: 54    /  pO2: 66    / HCO3: 24    / Base Excess: -3.0  /  SaO2: 91.3   Lactate: 1.3                RECENT CULTURES:  Specimen Source: .Body Fluid Pleural Fluid  Date/Time: 06-11 @ 02:42  Culture Results: --  Gram Stain:   Few polymorphonuclear leukocytes seen  No organisms seen  Organism: --      CAPILLARY BLOOD GLUCOSE      POCT Blood Glucose.: 194 mg/dL (11 Jun 2022 18:25)  POCT Blood Glucose.: 192 mg/dL (11 Jun 2022 06:12)      IMAGING STUDIES:

## 2022-06-12 NOTE — PROGRESS NOTE ADULT - ASSESSMENT
67yo man with PMH of CVA (2019) with seizure disorder, ESRD s/p renal transplant (04/2022), afib (on apixaban), CAD (s/p stents), CABG (2020), HTN, HLD, DM presents to the ED with status epilepticus. Semiology includes eyes deviated to the R, rhythmic R facial twitching as well as R shoulder clonic movements lasting approximately 30 seconds each. Of note patient is receiving antibiotics for Klebsiella UTI. Patient received Versed 5mg with EMS, Ativan 2mg and Keppra 1g load in the ED. Labs remarkable for VPA level 34. EEG from 04/2022 showed L frontocentral focal onset seizures. s/p intubation on 6/11. EEG negative for seizure but showed structural or functional abnormality in the left fronto-temporal region and moderate to severe nonspecific diffuse or multifocal cerebral dysfunction.     Impression: Status epilepticus likely provoked in the setting of UTI and insufficient AED coverage, with possible decreased seizure threshold in the setting of known focal motor seizures.    Recommendations:  [x] s/p EEG  [] Telemetry monitoring  [] C/w Keppra 250mg IV q12h and  mg IV q8  [] F/u VPA and albumin level  [] Neuro checks and vital signs per unit protocol  [] Seizure/fall/aspiration precautions  [] Advise against driving, operating heavy machinery, water sports/bathing, activity in elevation without appropriate safety precautions    Case seen and discussed with general neurology attending Dr. Domingo 65yo man with PMH of CVA (2019) with seizure disorder, ESRD s/p renal transplant (04/2022), afib (on apixaban), CAD (s/p stents), CABG (2020), HTN, HLD, DM presents to the ED with status epilepticus. Semiology includes eyes deviated to the R, rhythmic R facial twitching as well as R shoulder clonic movements lasting approximately 30 seconds each. Of note patient is receiving antibiotics for Klebsiella UTI. Patient received Versed 5mg with EMS, Ativan 2mg and Keppra 1g load in the ED. Labs remarkable for VPA level 34. EEG from 04/2022 showed L frontocentral focal onset seizures. s/p intubation on 6/11. EEG negative for seizure but showed structural or functional abnormality in the left fronto-temporal region and moderate to severe nonspecific diffuse or multifocal cerebral dysfunction.     Impression: Status epilepticus likely provoked in the setting of UTI and insufficient AED coverage, with possible decreased seizure threshold in the setting of known focal motor seizures.    Recommendations:  [x] s/p EEG  [] Telemetry monitoring  [] C/w Keppra 250mg IV q12h and  mg IV q8  [] Would draw valproic acid level stat  [] Neuro checks and vital signs per unit protocol  [] Seizure/fall/aspiration precautions  [] Advise against driving, operating heavy machinery, water sports/bathing, activity in elevation without appropriate safety precautions    Case seen and discussed with general neurology attending Dr. Domingo

## 2022-06-12 NOTE — PROGRESS NOTE ADULT - SUBJECTIVE AND OBJECTIVE BOX
Transplant Surgery - Consult Note  --------------------------------------------------------------  DCD DDRT     Date: 4/21/22    Present:   Patient seen and examined with multidisciplinary team including Transplant Surgeon: Dr. Davis, Transplant Nephrologist: Dr. Nova, NP Jose and unit RN during am rounds.  Disciplines not in attendance will be notified of the plan.     HPI: 66 yr old man with ESRD due to DM was on HD since 2019, s/p DCD DDRT on 4/21/22.  Donor was 58 , KDPI 82%, DCD, single vessels and ureter, HLA mismatch 1, 2, 2. No DSA, cPRA 0%. 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   Readmitted in May for anemia in the setting of large perinephric hematoma s/p evacuation and repair of arterial anastomosis on 5/2/22. Intra operative biopsy showed no rejection, Creatinine ranging ~2mg/dL    PMH: DM type II, HTN, CAD s/p CABG in 2020, Afib on Eliquis, CVA in 2019 due to Afib, Seizure d/o following CVA last episode was on 4/8/22, h/o GI bleed in 2/2022 EGD with duodenal ulcer.      He was recently dx with klebsiella UTI rx with ertapenem - then switched to Levaquin day #6.    He also had parainfluenza pneumonia. Was at rehab progressing well.  Presented with status epilepticus. Intubated for respiratory protection and transferred to MICU.  Underwent Thoracentesis 1.3L    Interval Events:  - Transferred to SICU for further care in setting of hypoxia, significant R pleural effusion, hgb 6 and hemodynamic instability  - Intubated at 9pm and sedated on Precedex.  Able to open eyes to name. JONATHAN this morning.    - EEG disconnected -> no seizure activity recorded.  Neuro following/adjusting meds  - CT placed, 600ml blood drained.  1L total overnight  - Received Protamine for PTT >100 (was on Heparin drip started for AF), 2 u FFP and 5u PRBC total  - Lactate cleared.  NS @ 75     SCr peak 2.22, slightly down this am 2.09   UOP ~ 10ml/hour overnight  1.7L/24 hours  - Requiring Jay for BP support  - On Cefepime for UTI.     6/10 BCx ngtd   6/11 pleural fluid Cx: ngtd  - MMF held    Immunosuppression:   Induction:        Thymoglobulin                                        Maintenance immunosuppression: Tacrolimus 2mg per ngt bid, MMF HELD, Pred 5  Ongoing monitoring for signs of rejection.    Potential Discharge date: pending clinical improvement    Education:  Medications    Plan of care:  See Below      MEDICATIONS  (STANDING):  atorvastatin 40 milliGRAM(s) Oral at bedtime  cefepime   IVPB 1000 milliGRAM(s) IV Intermittent every 24 hours  chlorhexidine 0.12% Liquid 15 milliLiter(s) Oral Mucosa every 12 hours  dexMEDEtomidine Infusion 0.1 MICROgram(s)/kG/Hr (1.54 mL/Hr) IV Continuous <Continuous>  fluconAZOLE   40 mG/mL Suspension 200 milliGRAM(s) Oral daily  insulin lispro (ADMELOG) corrective regimen sliding scale   SubCutaneous every 6 hours  levETIRAcetam  IVPB 250 milliGRAM(s) IV Intermittent every 12 hours  levothyroxine Injectable 40 MICROGram(s) IV Push at bedtime  magnesium oxide 400 milliGRAM(s) Oral every 8 hours  multivitamin/minerals/iron Oral Solution (CENTRUM) 15 milliLiter(s) Enteral Tube daily  pantoprazole  Injectable 40 milliGRAM(s) IV Push daily  phenylephrine    Infusion 0.6 MICROgram(s)/kG/Min (13.9 mL/Hr) IV Continuous <Continuous>  polyethylene glycol 3350 17 Gram(s) Oral daily  predniSONE   Tablet 5 milliGRAM(s) Oral daily  senna Syrup 10 milliLiter(s) Oral at bedtime  sodium chloride 0.9%. 1000 milliLiter(s) (75 mL/Hr) IV Continuous <Continuous>  tacrolimus    0.5 mG/mL Suspension 1 milliGRAM(s) Oral <User Schedule>  trimethoprim  40 mG/sulfamethoxazole 200 mG Suspension 80 milliGRAM(s) Enteral Tube daily  valGANciclovir 50 mG/mL Oral Solution 450 milliGRAM(s) Oral <User Schedule>  valproate sodium  IVPB 750 milliGRAM(s) IV Intermittent every 8 hours    MEDICATIONS  (PRN):  acetaminophen    Suspension .. 975 milliGRAM(s) Enteral Tube every 6 hours PRN Mild Pain (1 - 3)      PAST MEDICAL & SURGICAL HISTORY:  Hypertension      Diabetes      Dyslipidemia      CAD (Coronary Artery Disease)  with Stents in 06/2009 and 6/2019, s/p off-pump C3L on 8/13/20      Hypothyroidism      CVA (cerebral vascular accident)  12/13/19 with residual bilateral weakness      Anemia      PEG (percutaneous endoscopic gastrostomy) status  removed July 2020      Intubation of airway performed without difficulty  Dec 2019  CVA c/b status epilepticus requiring intubation and PEG in 12/2019-      ESRD on dialysis  M/W/F      Seizures  after CVA, last seizure was last week 4/07/22      History of insertion of stent into coronary artery bypass graft  2009 and 2019      S/P CABG x 3  off pump C3L on 8/13/20          Vital Signs Last 24 Hrs  T(C): 37 (12 Jun 2022 07:00), Max: 37.8 (12 Jun 2022 03:00)  T(F): 98.6 (12 Jun 2022 07:00), Max: 100 (12 Jun 2022 03:00)  HR: 49 (12 Jun 2022 15:00) (47 - 72)  BP: 89/47 (11 Jun 2022 20:30) (89/47 - 129/61)  BP(mean): 64 (11 Jun 2022 20:30) (64 - 88)  RR: 15 (12 Jun 2022 15:00) (15 - 34)  SpO2: 100% (12 Jun 2022 15:00) (97% - 100%)    I&O's Summary    11 Jun 2022 07:01  -  12 Jun 2022 07:00  --------------------------------------------------------  IN: 3294.6 mL / OUT: 2745 mL / NET: 549.6 mL    12 Jun 2022 07:01  -  12 Jun 2022 15:24  --------------------------------------------------------  IN: 686.4 mL / OUT: 385 mL / NET: 301.4 mL                              8.4    9.80  )-----------( 196      ( 12 Jun 2022 13:41 )             24.2     06-12    141  |  111<H>  |  67<H>  ----------------------------<  175<H>  4.5   |  18<L>  |  2.37<H>    Ca    8.0<L>      12 Jun 2022 13:41  Phos  3.9     06-12  Mg     2.2     06-12    TPro  5.5<L>  /  Alb  2.3<L>  /  TBili  0.3  /  DBili  0.1  /  AST  15  /  ALT  10  /  AlkPhos  82  06-11    Tacrolimus (), Serum: 3.6 ng/mL (06-11 @ 06:44)        Culture - Body Fluid with Gram Stain (collected 06-11-22 @ 02:42)  Source: .Body Fluid Pleural Fluid  Gram Stain (06-11-22 @ 12:29):    Few polymorphonuclear leukocytes seen    No organisms seen  Preliminary Report (06-12-22 @ 07:20):    No growth    Culture - Blood (collected 06-10-22 @ 18:15)  Source: .Blood Blood  Preliminary Report (06-12-22 @ 01:02):    No growth to date.    Culture - Blood (collected 06-10-22 @ 18:00)  Source: .Blood Blood  Preliminary Report (06-12-22 @ 01:02):    No growth to date.        Review of systems  Unable to obtain   Intubated/Sedated      PHYSICAL EXAM:  Constitutional: Well developed / well nourished  Eyes: Anicteric, PERRLA  ENMT: Intubated  Neck: supple  Respiratory: CTA anterior. R CT to sxn with bloody drainage  Cardiovascular: RRR  Gastrointestinal: Soft, non distended, NT, incision healed   Genitourinary: tucker  Extremities: SCD's in place and working bilaterally, no edema  Vascular: Palpable dp pulses bilaterally  Neurological: sedated on precedex. Opened eyes to name  Skin: no rashes, ulcerations or lesions  Musculoskeletal: Moving all extremities  Psychiatric: sedated

## 2022-06-12 NOTE — PROGRESS NOTE ADULT - NUTRITIONAL ASSESSMENT
This patient has been assessed with a concern for Malnutrition and has been determined to have a diagnosis/diagnoses of Severe protein-calorie malnutrition.    This patient is being managed with:   Diet NPO-  Except Medications  Entered: Jun 11 2022 10:50PM

## 2022-06-12 NOTE — PROGRESS NOTE ADULT - ATTENDING COMMENTS
5yo man with PMH of large bilateral CVA (2019) with subsequent seizure disorder, ESRD s/p renal transplant (04/2022), afib (on apixaban), CAD (s/p stents), CABG (2020), HTN, HLD, DM presents to the ED with status epilepticus from [facility]. EEG from 04/2022 showed L frontocentral focal onset seizures lasting 40-60 seconds.    Pt this morning on precedex, remains intubated, PERRL, EOMI, gag intact, withdraws to pain x4.   CTH extensive encephalomalacia. EEG overnight and this morning without seizure activity.    encephalomalacia, hx of focal seizure with impaired awareness, p/w recent UTI, status epilepticus, with subtherapeutic VPA levels.    - EEG can be discontinued, can monitor for clinical activity  - continue Keppra 250mg BID, VPA 750mg BID  - check free VPA level. VPA  today was 28, with albumin of 2.3, may actually be therapeutic  - seizure precautions  - neuro checks q2hrs  - treatment of infxn or metabolic derangement as per MICU team .

## 2022-06-12 NOTE — PROGRESS NOTE ADULT - ASSESSMENT
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 Creatinine trending down to 2-2.2mg/dL. H/o seizure d/o with last seizure episode on 4/8/22. Admitted for status epilepticus in the setting of sub therapeutic valproic acid levels. Had right thoracentesis done on 6/10 for effusion complicated by large hemothorax, hemorrhagic shock requiring PRBC x 5 units and vasopressors, chest tube placed. BP now stable on low dose phenylephrine.     PMH: DM type II, HTN, CAD s/p CABG in 2020, Afib on Eliquis, CVA in 2019 due to Afib, Seizure d/o following CVA last episode was on 4/8/22, h/o GI bleed in 2/2022 EGD with duodenal ulcer.    Donor was 58 , KDPI 82%, DCD, single vessels and ureter, HLA mismatch 1, 2, 2. No DSA, cPRA 0%. 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   Readmitted in May for anemia in the setting of large perinephric hematoma s/p evacuation and repair of arterial anastomosis on 5/2/22. Intra operative biopsy showed no rejection,   Creatinine ranging 2mg/dL. Recent dx of klebsiella UTI on Levaquin       1. S/p DCD DDRT on 4/21/22  - creatinine stable at 2mg/dL but oliguric, expect cr to rise tomorrow in the setting of hemodynamic instability.        2. Immunosuppression - continue prograf suspension 2mg bid. Obtain tacrolimus level 30min prior to AM dose.       Continue prednisone 5mg daily. Hold CellCept for now      Infection prophylaxis - continue bactrim, Valcyte , fluconazole 200mg daily (on fluconazole after repair of arterial anastomosis)    3. Right hemothorax following thoracentesis with severe anemia - s/p PRBC and FFP, chest tube draining 1 liter. Continue to monitor H/H and chest tube output.     4. Seizure d/o -admitted with status epilepticus in the setting of low VPA levels. On Keppra and Valproic acid, VPA level therapeutic, no further seizures on EEG.     5. Klebsiella UT_ - continue cefepime, f/u urine culture      66 yr old man with h/o ESRD due to DM on HD since 2019, s/p DDRT from DCD donor on 4/21/22 complicated by DGF requiring HD until 4/29/22. Creatinine trending down to 2-2.2mg/dL. H/o seizure d/o with last seizure episode on 4/8/22. Admitted for status epilepticus in the setting of sub therapeutic valproic acid levels. Had right thoracentesis done on 6/10 for effusion complicated by large hemothorax, hemorrhagic shock requiring PRBC x 5 units and vasopressors, chest tube placed. BP now stable on low dose phenylephrine.     PMH: DM type II, HTN, CAD s/p CABG in 2020, Afib on Eliquis, CVA in 2019 due to Afib, Seizure d/o following CVA last episode was on 4/8/22, h/o GI bleed in 2/2022 EGD with duodenal ulcer.    Donor was 58 , KDPI 82%, DCD, single vessels and ureter, HLA mismatch 1, 2, 2. No DSA, cPRA 0%. 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   Readmitted in May for anemia in the setting of large perinephric hematoma s/p evacuation and repair of arterial anastomosis on 5/2/22. Intra operative biopsy showed no rejection,   Creatinine ranging 2mg/dL. Recent dx of klebsiella UTI on Levaquin       1. S/p DCD DDRT on 4/21/22  - creatinine stable at 2mg/dL but oliguric, expect cr to rise tomorrow in the setting of hemodynamic instability.        2. Immunosuppression - continue prograf suspension 2mg bid. Obtain tacrolimus level 30min prior to AM dose.       Continue prednisone 5mg daily. Hold CellCept for now       Consider changing tacro to belatacept since tacrolimus can lower seizure threshold      Infection prophylaxis - continue bactrim, Valcyte , fluconazole 200mg daily (on fluconazole after repair of arterial anastomosis)    3. Right hemothorax following thoracentesis with severe anemia - s/p PRBC and FFP, chest tube draining 1 liter. Continue to monitor H/H and chest tube output.     4. Seizure d/o -admitted with status epilepticus in the setting of low VPA levels. On Keppra and Valproic acid, VPA level therapeutic, no further seizures on EEG.     5. Klebsiella UT_ - continue cefepime, f/u urine culture

## 2022-06-12 NOTE — EEG REPORT - NS EEG TEXT BOX
[facility]  Comprehensive Epilepsy Center  Report of Continuous Video EEG    [facility]: [address], [place] [address], Phone [phone]  [place] Office: [address], [address], [place] [address] Phone [phone]    [facility]: [address] [place] [address], Phone [phone]  [place] Office: [address] [place] [address], Phone [phone]    Patient Name: Srinivasan Castelan    Age: 66 year, : 1955  Patient ID: -, MRN #: -, Amin: MICU [id number] [id number]  Referring Physician: -    Study Time/Date: 8:00AM on 2022  	  End Time/Date: 800PM on 2022          			   Duration: 12H    Study Information:    EEG Recording Technique:  The patient underwent continuous Video-EEG monitoring, using Telemetry System hardware on the XLTek Digital System. EEG and video data were stored on a computer hard drive with important events saved in digital archive files. The material was reviewed by a physician (electroencephalographer / epileptologist) on a daily basis. Ramon and seizure detection algorithms were utilized and reviewed. An EEG Technician attended to the patient, and was available throughout daytime work hours.  The epilepsy center neurologist was available in person or on call 24-hours per day.    EEG Placement and Labeling of Electrodes:  The EEG was performed utilizing 20 channel referential EEG connections (coronal over temporal over parasagittal montage) using all standard 10-20 electrode placements with additional electrodes placed in the inferior temporal region using the modified 10-10 montage electrode placements for elective admissions, or if deemed necessary. Recording was at a sampling rate of 256 samples per second per channel. Time synchronized digital video recording was done simultaneously with EEG recording. A low light infrared camera was used for low light recording.     History:   VEEG Performed bedside  COR: Sedated, intubated  No HV due to COVID Protocol  No PHOTIC due to ICU   67 Y/O Female  P/W: R/O Seizure  H/O: Seizures, PEG, Anemia, CVA, CAD, DM, HTN, Hypothyroidism      Pertinent Medication  Diprivan  Ativan  Depacon  Keppra    Interpretation:    Daily EEG Visual Analysis  Findings: The background was continuous and somewhat reactive at times.   No posterior dominant rhythm seen.  Background predominantly consisted of theta, delta and faster activities.    Focal Slowing:   None were present.  Intermittent theta/delta slowing over the left fronto-temporal region, sharply contoured at times.    Sleep Background:  Absence of state change.    Other Non-Epileptiform Findings:  None were present.    Interictal Epileptiform Activity:   None were present.    Events:  Clinical events: None recorded.  Seizures: None recorded.    Activation Procedures:   Hyperventilation was not performed.    Photic stimulation was not performed.     Artifacts:  Intermittent myogenic and movement artifacts were noted.    EEG Summary / Classification:  Abnormal EEG   - Left fronto-temporal slowing  - Moderate generalized slowing.    EEG Impression / Clinical Correlate:  Abnormal EEG study.  Structural or functional abnormality in the left fronto-temporal region  Moderate to severe nonspecific diffuse or multifocal cerebral dysfunction.   No epileptiform pattern or seizure seen.    In absence of other clinical findings, can discontinue video EEG at this time.    Gurwinder Carpenter MD  Attending Physician, [facility]

## 2022-06-12 NOTE — PROGRESS NOTE ADULT - ASSESSMENT
66 yr old man with h/o DM type II, HTN, CAD s/p CABG in 2020, Afib on Eliquis, CVA in 2019 due to Afib, Seizure d/o (last episode was on 4/8/22), h/o GI bleed in 2/2022 EGD with duodenal ulcer and ESRD on HD s/p DDRT from DCD donor on 4/21/22 complicated by DGF requiring HD until 4/29/22 now with downtrending creatinine who presented with status epilepticus in setting of UTI/antibiotics and sub-therapeutic valproic acid level     Seizure  - status epilepticus in setting of UTI/antibiotics and sub-therapeutic valproic acid level   - No seizure activity recorded on EEG.  EEG disconnected  - On Keppra and Valproic acid, adjusted by Neuro  - Appreciate Neurology recommendations    DCD DDRT 4/21/22  - Reintubated overnight for hypoxemia/respiratory distress and significant R hemothorax requiring CT placement  - On Phenylephrine for hemorrhagic shock s/p 5 PRBC, 2 FFP and Protamine for reversal of Heparin  - Oliguric overnight. Creatinine rising in setting of hypotension.   - Renal  graft US today  - Strict I/Os  - Vent and CT management as per SICU  - Immunosuppression: Decrease Prograf to 1mg suspension BID via NGT. Hold MMF. Continue prednisone 5mg daily.   - PPX: Bactrim, Valcyte, Fluconazole 200mg daily (on fluconazole after repair of arterial anastomosis)  - Q8 labs, Daily Tacrolimus level daily in AM prior to dose    Klebsiella UTI  - Continue Cefepime  - Blood Cx  6/10 ngtd  - ID consult    DM  -Fingersticks q6 hours while NPO with SSI coverage

## 2022-06-12 NOTE — PROGRESS NOTE ADULT - SUBJECTIVE AND OBJECTIVE BOX
SICU Daily Progress Note    Interval Events:  - 27F chest tube placed with return of 600cc blood  - patient given additional 2u pRBC       SUBJECTIVE/ROS:  Due to intubation, subjective information were not able to be obtained from the patient. History was obtained, to the extent possible, from review of the chart and collateral sources of information.    NEURO  Exam: arousable  Meds: acetaminophen    Suspension .. 975 milliGRAM(s) Enteral Tube every 6 hours PRN Mild Pain (1 - 3)  dexMEDEtomidine Infusion 0.1 MICROgram(s)/kG/Hr IV Continuous <Continuous>  levETIRAcetam  IVPB 250 milliGRAM(s) IV Intermittent every 12 hours  valproate sodium  IVPB 750 milliGRAM(s) IV Intermittent every 8 hours      RESPIRATORY  RR: 21 (06-12-22 @ 02:30) (16 - 34)  SpO2: 100% (06-12-22 @ 02:30) (97% - 100%)  Wt(kg): --  Exam: clear to auscultation bilaterally   Mechanical Ventilation: Mode: AC/ CMV (Assist Control/ Continuous Mandatory Ventilation), RR (machine): 16, RR (patient): 24, TV (machine): 450, FiO2: 40, PEEP: 5, ITime: 1, MAP: 11, PIP: 27  ABG - ( 12 Jun 2022 00:20 )  pH: 7.39  /  pCO2: 29    /  pO2: 177   / HCO3: 18    / Base Excess: -6.6  /  SaO2: 98.5    Lactate: x                Meds:     CARDIOVASCULAR  HR: 59 (06-12-22 @ 02:30) (48 - 73)  BP: 89/47 (06-11-22 @ 20:30) (89/47 - 167/73)  BP(mean): 64 (06-11-22 @ 20:30) (64 - 112)  ABP: 111/35 (06-12-22 @ 02:30) (93/32 - 151/54)  ABP(mean): 56 (06-12-22 @ 02:30) (49 - 92)  Wt(kg): --  CVP(cm H2O): --  VBG - ( 10 Raffi 2022 15:50 )  pH: 7.26  /  pCO2: 54    /  pO2: 66    / HCO3: 24    / Base Excess: -3.0  /  SaO2: 91.3   Lactate: 1.3                Exam: regular rate and rhythm  Cardiac Rhythm: sinus  Perfusion     [x]Adequate   [ ]Inadequate  Mentation   [x]Normal       [ ]Reduced  Extremities  [x]Warm         [ ]Cool  Volume Status [ ]Hypervolemic [x]Euvolemic [ ]Hypovolemic  Meds: phenylephrine    Infusion 0.6 MICROgram(s)/kG/Min IV Continuous <Continuous>      GI/NUTRITION  Exam: soft, nontender, nondistended  Diet: NPO  Meds: pantoprazole  Injectable 40 milliGRAM(s) IV Push daily  polyethylene glycol 3350 17 Gram(s) Oral daily  senna Syrup 10 milliLiter(s) Oral at bedtime      GENITOURINARY  I&O's Detail    06-10 @ 07:01 - 06-11 @ 07:00  --------------------------------------------------------  IN:    IV PiggyBack: 250 mL    PRBCs (Packed Red Blood Cells): 200 mL    Propofol (Status Epilepticus): 126.3 mL  Total IN: 576.3 mL    OUT:    Other (mL): 1300 mL    Voided (mL): 1200 mL  Total OUT: 2500 mL    Total NET: -1923.7 mL      06-11 @ 07:01  -  06-12 @ 02:36  --------------------------------------------------------  IN:    Dexmedetomidine: 7.5 mL    Heparin Infusion: 88 mL    IV PiggyBack: 450 mL    IV PiggyBack: 50 mL    Phenylephrine: 23.1 mL    Plasma: 600 mL    PRBCs (Packed Red Blood Cells): 900 mL    Propofol (Status Epilepticus): 22.2 mL    sodium chloride 0.9%: 300 mL  Total IN: 2440.8 mL    OUT:    Chest Tube (mL): 600 mL    Indwelling Catheter - Urethral (mL): 180 mL    Voided (mL): 1450 mL  Total OUT: 2230 mL    Total NET: 210.8 mL          06-12    140  |  109<H>  |  70<H>  ----------------------------<  251<H>  4.7   |  18<L>  |  2.09<H>    Ca    8.4      12 Jun 2022 00:50  Phos  5.0     06-12  Mg     1.7     06-12    TPro  5.5<L>  /  Alb  2.3<L>  /  TBili  0.3  /  DBili  0.1  /  AST  15  /  ALT  10  /  AlkPhos  82  06-11    [ ] Miranda catheter, indication:   Meds: magnesium oxide 400 milliGRAM(s) Oral every 8 hours  multivitamin/minerals/iron Oral Solution (CENTRUM) 15 milliLiter(s) Enteral Tube daily  sodium chloride 0.9%. 1000 milliLiter(s) IV Continuous <Continuous>      HEMATOLOGIC  Meds:   [ ] VTE Prophylaxis - held due to hemorrhage                        7.4    7.26  )-----------( 163      ( 12 Jun 2022 00:50 )             22.5     PT/INR - ( 12 Jun 2022 00:49 )   PT: 21.3 sec;   INR: 1.83 ratio         PTT - ( 12 Jun 2022 00:49 )  PTT:30.7 sec    INFECTIOUS DISEASES  T(C): 36.8 (06-12-22 @ 00:45), Max: 37.3 (06-11-22 @ 15:00)  Wt(kg): --  WBC Count: 7.26 K/uL (06-12 @ 00:50)  WBC Count: 7.81 K/uL (06-11 @ 23:22)  WBC Count: 8.62 K/uL (06-11 @ 22:21)  WBC Count: 8.96 K/uL (06-11 @ 21:08)  WBC Count: 9.64 K/uL (06-11 @ 18:49)  WBC Count: 6.48 K/uL (06-11 @ 10:29)    Recent Cultures:  Specimen Source: .Body Fluid Pleural Fluid, 06-11 @ 02:42; Results --; Gram Stain:   Few polymorphonuclear leukocytes seen  No organisms seen; Organism: --  Specimen Source: .Blood Blood, 06-10 @ 18:15; Results   No growth to date.; Gram Stain: --; Organism: --  Specimen Source: .Blood Blood, 06-10 @ 18:00; Results   No growth to date.; Gram Stain: --; Organism: --    Meds: cefepime   IVPB 1000 milliGRAM(s) IV Intermittent every 24 hours  fluconAZOLE   40 mG/mL Suspension 200 milliGRAM(s) Oral daily  tacrolimus    0.5 mG/mL Suspension 2 milliGRAM(s) Oral every 12 hours  trimethoprim  40 mG/sulfamethoxazole 200 mG Suspension 80 milliGRAM(s) Enteral Tube daily  valGANciclovir 50 mG/mL Oral Solution 450 milliGRAM(s) Oral <User Schedule>      ENDOCRINE  Capillary Blood Glucose    Meds: atorvastatin 40 milliGRAM(s) Oral at bedtime  insulin lispro (ADMELOG) corrective regimen sliding scale   SubCutaneous every 6 hours  levothyroxine Injectable 40 MICROGram(s) IV Push at bedtime  predniSONE   Tablet 5 milliGRAM(s) Oral daily      ACCESS DEVICES:  [x] Peripheral IV  [] Central Venous Line	[ ] R	[ ] L	[ ] IJ	[ ] Fem	[ ] SC	Placed:   [x] Arterial Line		[x] R	[ ] L	[ ] Fem	[x] Rad	[ ] Ax	Placed:   [ ] PICC:					[ ] Mediport  [x] Urinary Catheter  [ ] Necessity of urinary, arterial, and venous catheters discussed    OTHER MEDICATIONS:  chlorhexidine 0.12% Liquid 15 milliLiter(s) Oral Mucosa every 12 hours     SICU Daily Progress Note    Interval Events:  - 27F chest tube placed with return of 600cc blood  - patient given additional 1u pRBC       SUBJECTIVE/ROS:  Due to intubation, subjective information were not able to be obtained from the patient. History was obtained, to the extent possible, from review of the chart and collateral sources of information.    NEURO  Exam: awake,. alert, following commands  Meds: acetaminophen    Suspension .. 975 milliGRAM(s) Enteral Tube every 6 hours PRN Mild Pain (1 - 3)  dexMEDEtomidine Infusion 0.1 MICROgram(s)/kG/Hr IV Continuous <Continuous>  levETIRAcetam  IVPB 250 milliGRAM(s) IV Intermittent every 12 hours  valproate sodium  IVPB 750 milliGRAM(s) IV Intermittent every 8 hours      RESPIRATORY  RR: 21 (06-12-22 @ 02:30) (16 - 34)  SpO2: 100% (06-12-22 @ 02:30) (97% - 100%)  Wt(kg): --  Exam: clear to auscultation bilaterally   Mechanical Ventilation: Mode: AC/ CMV (Assist Control/ Continuous Mandatory Ventilation), RR (machine): 16, RR (patient): 24, TV (machine): 450, FiO2: 40, PEEP: 5, ITime: 1, MAP: 11, PIP: 27  ABG - ( 12 Jun 2022 00:20 )  pH: 7.39  /  pCO2: 29    /  pO2: 177   / HCO3: 18    / Base Excess: -6.6  /  SaO2: 98.5    Lactate: x                Meds:     CARDIOVASCULAR  HR: 59 (06-12-22 @ 02:30) (48 - 73)  BP: 89/47 (06-11-22 @ 20:30) (89/47 - 167/73)  BP(mean): 64 (06-11-22 @ 20:30) (64 - 112)  ABP: 111/35 (06-12-22 @ 02:30) (93/32 - 151/54)  ABP(mean): 56 (06-12-22 @ 02:30) (49 - 92)  Wt(kg): --  CVP(cm H2O): --  VBG - ( 10 Raffi 2022 15:50 )  pH: 7.26  /  pCO2: 54    /  pO2: 66    / HCO3: 24    / Base Excess: -3.0  /  SaO2: 91.3   Lactate: 1.3                Exam: regular rate and rhythm  Cardiac Rhythm: sinus  Perfusion     [x]Adequate   [ ]Inadequate  Mentation   [x]Normal       [ ]Reduced  Extremities  [x]Warm         [ ]Cool  Volume Status [ ]Hypervolemic [x]Euvolemic [ ]Hypovolemic  Meds: phenylephrine    Infusion 0.6 MICROgram(s)/kG/Min IV Continuous <Continuous>      GI/NUTRITION  Exam: soft, nontender, nondistended  Diet: NPO  Meds: pantoprazole  Injectable 40 milliGRAM(s) IV Push daily  polyethylene glycol 3350 17 Gram(s) Oral daily  senna Syrup 10 milliLiter(s) Oral at bedtime      GENITOURINARY  I&O's Detail    06-10 @ 07:01 - 06-11 @ 07:00  --------------------------------------------------------  IN:    IV PiggyBack: 250 mL    PRBCs (Packed Red Blood Cells): 200 mL    Propofol (Status Epilepticus): 126.3 mL  Total IN: 576.3 mL    OUT:    Other (mL): 1300 mL    Voided (mL): 1200 mL  Total OUT: 2500 mL    Total NET: -1923.7 mL      06-11 @ 07:01 - 06-12 @ 02:36  --------------------------------------------------------  IN:    Dexmedetomidine: 7.5 mL    Heparin Infusion: 88 mL    IV PiggyBack: 450 mL    IV PiggyBack: 50 mL    Phenylephrine: 23.1 mL    Plasma: 600 mL    PRBCs (Packed Red Blood Cells): 900 mL    Propofol (Status Epilepticus): 22.2 mL    sodium chloride 0.9%: 300 mL  Total IN: 2440.8 mL    OUT:    Chest Tube (mL): 600 mL    Indwelling Catheter - Urethral (mL): 180 mL    Voided (mL): 1450 mL  Total OUT: 2230 mL    Total NET: 210.8 mL          06-12    140  |  109<H>  |  70<H>  ----------------------------<  251<H>  4.7   |  18<L>  |  2.09<H>    Ca    8.4      12 Jun 2022 00:50  Phos  5.0     06-12  Mg     1.7     06-12    TPro  5.5<L>  /  Alb  2.3<L>  /  TBili  0.3  /  DBili  0.1  /  AST  15  /  ALT  10  /  AlkPhos  82  06-11    [ ] Miranda catheter, indication:   Meds: magnesium oxide 400 milliGRAM(s) Oral every 8 hours  multivitamin/minerals/iron Oral Solution (CENTRUM) 15 milliLiter(s) Enteral Tube daily  sodium chloride 0.9%. 1000 milliLiter(s) IV Continuous <Continuous>      HEMATOLOGIC  Meds:   [ ] VTE Prophylaxis - held due to hemorrhage                        7.4    7.26  )-----------( 163      ( 12 Jun 2022 00:50 )             22.5     PT/INR - ( 12 Jun 2022 00:49 )   PT: 21.3 sec;   INR: 1.83 ratio         PTT - ( 12 Jun 2022 00:49 )  PTT:30.7 sec    INFECTIOUS DISEASES  T(C): 36.8 (06-12-22 @ 00:45), Max: 37.3 (06-11-22 @ 15:00)  Wt(kg): --  WBC Count: 7.26 K/uL (06-12 @ 00:50)  WBC Count: 7.81 K/uL (06-11 @ 23:22)  WBC Count: 8.62 K/uL (06-11 @ 22:21)  WBC Count: 8.96 K/uL (06-11 @ 21:08)  WBC Count: 9.64 K/uL (06-11 @ 18:49)  WBC Count: 6.48 K/uL (06-11 @ 10:29)    Recent Cultures:  Specimen Source: .Body Fluid Pleural Fluid, 06-11 @ 02:42; Results --; Gram Stain:   Few polymorphonuclear leukocytes seen  No organisms seen; Organism: --  Specimen Source: .Blood Blood, 06-10 @ 18:15; Results   No growth to date.; Gram Stain: --; Organism: --  Specimen Source: .Blood Blood, 06-10 @ 18:00; Results   No growth to date.; Gram Stain: --; Organism: --    Meds: cefepime   IVPB 1000 milliGRAM(s) IV Intermittent every 24 hours  fluconAZOLE   40 mG/mL Suspension 200 milliGRAM(s) Oral daily  tacrolimus    0.5 mG/mL Suspension 2 milliGRAM(s) Oral every 12 hours  trimethoprim  40 mG/sulfamethoxazole 200 mG Suspension 80 milliGRAM(s) Enteral Tube daily  valGANciclovir 50 mG/mL Oral Solution 450 milliGRAM(s) Oral <User Schedule>      ENDOCRINE  Capillary Blood Glucose    Meds: atorvastatin 40 milliGRAM(s) Oral at bedtime  insulin lispro (ADMELOG) corrective regimen sliding scale   SubCutaneous every 6 hours  levothyroxine Injectable 40 MICROGram(s) IV Push at bedtime  predniSONE   Tablet 5 milliGRAM(s) Oral daily      ACCESS DEVICES:  [x] Peripheral IV  [] Central Venous Line	[ ] R	[ ] L	[ ] IJ	[ ] Fem	[ ] SC	Placed:   [x] Arterial Line		[x] R	[ ] L	[ ] Fem	[x] Rad	[ ] Ax	Placed:   [ ] PICC:					[ ] Mediport  [x] Urinary Catheter  [ ] Necessity of urinary, arterial, and venous catheters discussed    OTHER MEDICATIONS:  chlorhexidine 0.12% Liquid 15 milliLiter(s) Oral Mucosa every 12 hours

## 2022-06-12 NOTE — PROGRESS NOTE ADULT - ASSESSMENT
Mr. Castelan is a 66 yr old man with PMHx DM type II, HTN, CAD s/p CABG in 2020, Afib on Eliquis, CVA in 2019 due to Afib, Seizure d/o following CVA last episode was on 4/8/22, h/o GI bleed in 2/2022 EGD with duodenal ulcer,     ESRD s/p DDRT on 4/21/22 (course complicated by DGF, was on HD until 4/29/22, large perinephric hematoma s/p evacuation and repair of arterial anastomosis on 5/2/22) presented from rehab 6/10 with status epilepticus secondary to likely UTI, intubated for airway protection, s/p thoracentesis complicated by hemothorax. SICU consulted for further management    Plan    Neurologic  h/o of CVA with seizure disorder, presented in status epilepticus secondary to likely UTI  - currently sedated with precedex  - loaded with valproate 6/11  - continue with valproate 750 q8, keppra 250 BID  - no seizure activity captured on EEG, will continue in AM    Respiratory  re-intubated 6/11 for airway protection, s/p right thoracentesis complicated by hemothorax  - on mechanical vent: PRVC 450/16/5/50%  - thoracic surgery consulted, s/p placement of 28F chest tube     Cardiovascular  hypotension secondary to large hemothorax  - responsive to resuscitation with blood products  - holding home antihypertensives  - lactate normal, will trend on hemorrhage watch    Gastrointestinal  no active issues  - NPO, OGT  - protonix ppx    Genitourinary  ESRD s/p DDRT 4/21/22 c/b delayed graft function, now no longer on HD  - tucker in place for strict I/Os  - NS @75cc/hr  - recent klebsiella UTI treated with Ertapenem then Levaquin, UA on admission negative  - continue with immunosuppression per transplant (prednisone 5mg daily, tacro 2mg BID)    Hematologic  hemorrhage watch in the setting of large hemothorax, supratherapuetic ptt s/p reversal with protamine, given 3u pRBC and 2 FFP  - given additional 2u pRBC overnight  - continue to hold anticoagulation    Infectious Disease  - continue prophylactic Bactrim, Valcyte, Fluconazole  - continue cefepime  - monitor WBC, fevers    Endocrine  h/o DM  - continue ISS q6  - synthroid 40mg    Lines/Tubes/Drains:  - ETT  - Tucker  - right radial Dasia  - PIV x2  - 27F right chest tube    Dispo; will admit to SICU  Code Status: full code   Mr. Castelan is a 66 yr old man with PMHx DM type II, HTN, CAD s/p CABG in 2020, Afib on Eliquis, CVA in 2019 due to Afib, Seizure d/o following CVA last episode was on 4/8/22, h/o GI bleed in 2/2022 EGD with duodenal ulcer,     ESRD s/p DDRT on 4/21/22 (course complicated by DGF, was on HD until 4/29/22, large perinephric hematoma s/p evacuation and repair of arterial anastomosis on 5/2/22) presented from rehab 6/10 with status epilepticus secondary to likely UTI, intubated for airway protection, s/p thoracentesis complicated by hemothorax. SICU consulted for further management    Plan    Neurologic  h/o of CVA with seizure disorder, presented in status epilepticus secondary to likely UTI  - currently sedated with precedex  - loaded with valproate 6/11  - continue with valproate 750 q8, keppra 250 BID  - no seizure activity captured on EEG, will continue in AM    Respiratory  re-intubated 6/11 for airway protection, s/p right thoracentesis complicated by hemothorax  - on mechanical vent: PRVC 450/16/5/50%  - thoracic surgery consulted, s/p placement of 28F chest tube     Cardiovascular  hypotension secondary to large hemothorax  - responsive to resuscitation with blood products  - holding home antihypertensives  - lactate normal, will trend on hemorrhage watch    Gastrointestinal  no active issues  - NPO, OGT  - protonix ppx    Genitourinary  ESRD s/p DDRT 4/21/22 c/b delayed graft function, now no longer on HD  - tucker in place for strict I/Os  - NS @75cc/hr  - recent klebsiella UTI treated with Ertapenem then Levaquin, UA on admission negative  - continue with immunosuppression per transplant (prednisone 5mg daily, tacro 2mg BID)    Hematologic  hemorrhage watch in the setting of large hemothorax, supratherapuetic ptt s/p reversal with protamine, given 3u pRBC and 2 FFP  - given additional 1u pRBC overnight  - continue to hold anticoagulation    Infectious Disease  - continue prophylactic Bactrim, Valcyte, Fluconazole  - continue cefepime  - monitor WBC, fevers    Endocrine  h/o DM  - continue ISS q6  - synthroid 40mg    Lines/Tubes/Drains:  - ETT  - Tucker  - right radial Dasia  - PIV x2  - 28F right chest tube    Dispo; will admit to SICU  Code Status: full code

## 2022-06-12 NOTE — PROGRESS NOTE ADULT - SUBJECTIVE AND OBJECTIVE BOX
SUBJECTIVE/INTERVAL HISTORY:  - s/p intubation on   - remains on precedex    PAST MEDICAL & SURGICAL HISTORY:  Hypertension      Diabetes      Dyslipidemia      CAD (Coronary Artery Disease)  with Stents in 2009 and 2019, s/p off-pump C3L on 20      Hypothyroidism      CVA (cerebral vascular accident)  19 with residual bilateral weakness      Anemia      PEG (percutaneous endoscopic gastrostomy) status  removed 2020      Intubation of airway performed without difficulty  Dec 2019  CVA c/b status epilepticus requiring intubation and PEG in 2019-      ESRD on dialysis  M/W/F      Seizures  after CVA, last seizure was last week 22      History of insertion of stent into coronary artery bypass graft   and 2019      S/P CABG x 3  off pump C3L on 20        FAMILY HISTORY:  Family history of cancer of tongue (Father)  Parents are  . Father - at 80 years, tongue cancer was a smoker. Mother- at 71, had accident while crossing road. Siblings- 2 brothers and one sister.      SOCIAL HISTORY:   T/E/D:   Occupation:   Lives with:     MEDICATIONS (HOME):  Home Medications:  amLODIPine 10 mg oral tablet: 1 tab(s) enteral once a day (2022 15:45)  apixaban 2.5 mg oral tablet: 1 tab(s) orally 2 times a day (2022 15:45)  atorvastatin 40 mg oral tablet: 1 tab(s) orally once a day (at bedtime) (2022 15:45)  CellCept 500 mg oral tablet: 1 tab(s) orally 2 times a day (2022 15:45)  Coreg 6.25 mg oral tablet: 1 tab(s) orally 2 times a day (2022 15:45)  divalproex sodium 250 mg oral delayed release tablet: 1 tab(s) orally once a day (2022 15:45)  divalproex sodium 500 mg oral delayed release tablet: 1 tab(s) orally 2 times a day (2022 15:45)  Envarsus XR 1 mg oral tablet, extended release: 3 tab(s) by gastrostomy tube once a day (in the morning) (2022 15:45)  fluconazole 200 mg oral tablet: 1 tab(s) orally once a day (2022 15:45)  HumaLOG 100 units/mL injectable solution: 5 unit(s) injectable 3 times a day (2022 15:45)  insulin glargine 100 units/mL subcutaneous solution: 6 unit(s) subcutaneous once a day (at bedtime) (2022 14:27)  levETIRAcetam 250 mg oral tablet: 1 tab(s) orally 2 times a day (2022 15:45)  levothyroxine 50 mcg (0.05 mg) oral tablet: 1 tab(s) orally once a day (2022 15:45)  magnesium oxide 400 mg oral tablet: 1 tab(s) orally 3 times a day (with meals) (2022 15:45)  Multiple Vitamins oral capsule: 1 cap(s) orally once a day (2022 15:45)  pantoprazole 40 mg oral delayed release tablet: 1 tab(s) orally once a day (before a meal) (2022 15:45)  predniSONE: 5 milligram(s) orally once a day (2022 15:45)  senna oral tablet: 2 tab(s) orally once a day (at bedtime) (2022 15:45)  sulfamethoxazole-trimethoprim 400 mg-80 mg oral tablet: 1 tab(s) orally once a day (2022 15:45)  valGANciclovir 450 mg oral tablet: 1 tab(s) orally 3 times a week (2022 15:45)    MEDICATIONS  (STANDING):  atorvastatin 40 milliGRAM(s) Oral at bedtime  cefepime   IVPB 1000 milliGRAM(s) IV Intermittent every 24 hours  chlorhexidine 0.12% Liquid 15 milliLiter(s) Oral Mucosa every 12 hours  dexMEDEtomidine Infusion 0.1 MICROgram(s)/kG/Hr (1.54 mL/Hr) IV Continuous <Continuous>  fluconAZOLE   40 mG/mL Suspension 200 milliGRAM(s) Oral daily  insulin lispro (ADMELOG) corrective regimen sliding scale   SubCutaneous every 6 hours  levETIRAcetam  IVPB 250 milliGRAM(s) IV Intermittent every 12 hours  levothyroxine Injectable 40 MICROGram(s) IV Push at bedtime  magnesium oxide 400 milliGRAM(s) Oral every 8 hours  multivitamin/minerals/iron Oral Solution (CENTRUM) 15 milliLiter(s) Enteral Tube daily  pantoprazole  Injectable 40 milliGRAM(s) IV Push daily  phenylephrine    Infusion 0.6 MICROgram(s)/kG/Min (13.9 mL/Hr) IV Continuous <Continuous>  polyethylene glycol 3350 17 Gram(s) Oral daily  predniSONE   Tablet 5 milliGRAM(s) Oral daily  senna Syrup 10 milliLiter(s) Oral at bedtime  sodium chloride 0.9%. 1000 milliLiter(s) (75 mL/Hr) IV Continuous <Continuous>  tacrolimus    0.5 mG/mL Suspension 2 milliGRAM(s) Oral every 12 hours  trimethoprim  40 mG/sulfamethoxazole 200 mG Suspension 80 milliGRAM(s) Enteral Tube daily  valGANciclovir 50 mG/mL Oral Solution 450 milliGRAM(s) Oral <User Schedule>  valproate sodium  IVPB 750 milliGRAM(s) IV Intermittent every 8 hours    MEDICATIONS  (PRN):  acetaminophen    Suspension .. 975 milliGRAM(s) Enteral Tube every 6 hours PRN Mild Pain (1 - 3)    ALLERGIES/INTOLERANCES:  Allergies  No Known Allergies    Intolerances    VITALS & EXAMINATION:  Vital Signs Last 24 Hrs  T(C): 37 (2022 07:00), Max: 37.8 (2022 03:00)  T(F): 98.6 (2022 07:00), Max: 100 (2022 03:00)  HR: 52 (2022 10:00) (51 - 73)  BP: 89/47 (2022 20:30) (89/47 - 163/67)  BP(mean): 64 (2022 20:30) (64 - 112)  RR: 17 (2022 10:00) (16 - 34)  SpO2: 100% (2022 10:00) (97% - 100%)    Neurologic:  - Mental Status: Eyes closed, opens to loud verbal and noxious stimuli. No response to verbal stimulus and withdrawal to noxious stimuli.  - Cranial Nerves: Eyes closed, primary gaze. Pupils 2mm OU and minimally reactive. Face appears grossly symmetric although difficult to adequately assess due to intubation.  - Motor: No purposeful movements. Withdraws to noxious stimuli throughout extremities.  - Reflexes: 2+ in the R bicep and brachioradialis, trace in the L bicep and brachioradialis, knees b/l, and ankles. No clonus at ankle b/l. Plantar responses down bilaterally.  - Sensory: No grimace to noxious stimuli throughout extremities    LABORATORY:  CBC                       8.3    8.93  )-----------( 178      ( 2022 10:07 )             24.7     Chem 06-12    140  |  109<H>  |  70<H>  ----------------------------<  251<H>  4.7   |  18<L>  |  2.09<H>    Ca    8.4      2022 00:50  Phos  5.0     06-12  Mg     1.7     06-12    TPro  5.5<L>  /  Alb  2.3<L>  /  TBili  0.3  /  DBili  0.1  /  AST  15  /  ALT  10  /  AlkPhos  82  06-11    LFTs LIVER FUNCTIONS - ( 2022 21:08 )  Alb: 2.3 g/dL / Pro: 5.5 g/dL / ALK PHOS: 82 U/L / ALT: 10 U/L / AST: 15 U/L / GGT: x           Coagulopathy PT/INR - ( 2022 00:49 )   PT: 21.3 sec;   INR: 1.83 ratio         PTT - ( 2022 00:49 )  PTT:30.7 sec  Lipid Panel   A1c   Cardiac enzymes CARDIAC MARKERS ( 2022 01:29 )  x     / x     / <10 U/L / x     / 2.1 ng/mL      U/A Urinalysis Basic - ( 10 Raffi 2022 22:28 )    Color: Light Yellow / Appearance: Clear / S.016 / pH: x  Gluc: x / Ketone: Negative  / Bili: Negative / Urobili: Negative   Blood: x / Protein: Trace / Nitrite: Negative   Leuk Esterase: Negative / RBC: 25 /hpf / WBC 5 /HPF   Sq Epi: x / Non Sq Epi: 1 /hpf / Bacteria: Negative      CSF  Immunological  Other    STUDIES & IMAGING:  Studies (EKG, EEG, EMG, etc):   EEG Summary / Classification:  Abnormal EEG   - Left fronto-temporal slowing  - Moderate generalized slowing.    EEG Impression / Clinical Correlate:  Abnormal EEG study.  Structural or functional abnormality in the left fronto-temporal region  Moderate to severe nonspecific diffuse or multifocal cerebral dysfunction.   No epileptiform pattern or seizure seen.    In absence of other clinical findings, can discontinue video EEG at this time.    Gurwinder Carpenter MD  Attending Physician, [facility] [place]      Radiology (XR, CT, MR, U/S, TTE/DINAH): SUBJECTIVE/INTERVAL HISTORY:  - s/p intubation on   - remains on precedex      MEDICATIONS  (STANDING):  atorvastatin 40 milliGRAM(s) Oral at bedtime  cefepime   IVPB 1000 milliGRAM(s) IV Intermittent every 24 hours  chlorhexidine 0.12% Liquid 15 milliLiter(s) Oral Mucosa every 12 hours  dexMEDEtomidine Infusion 0.1 MICROgram(s)/kG/Hr (1.54 mL/Hr) IV Continuous <Continuous>  fluconAZOLE   40 mG/mL Suspension 200 milliGRAM(s) Oral daily  insulin lispro (ADMELOG) corrective regimen sliding scale   SubCutaneous every 6 hours  levETIRAcetam  IVPB 250 milliGRAM(s) IV Intermittent every 12 hours  levothyroxine Injectable 40 MICROGram(s) IV Push at bedtime  magnesium oxide 400 milliGRAM(s) Oral every 8 hours  multivitamin/minerals/iron Oral Solution (CENTRUM) 15 milliLiter(s) Enteral Tube daily  pantoprazole  Injectable 40 milliGRAM(s) IV Push daily  phenylephrine    Infusion 0.6 MICROgram(s)/kG/Min (13.9 mL/Hr) IV Continuous <Continuous>  polyethylene glycol 3350 17 Gram(s) Oral daily  predniSONE   Tablet 5 milliGRAM(s) Oral daily  senna Syrup 10 milliLiter(s) Oral at bedtime  sodium chloride 0.9%. 1000 milliLiter(s) (75 mL/Hr) IV Continuous <Continuous>  tacrolimus    0.5 mG/mL Suspension 2 milliGRAM(s) Oral every 12 hours  trimethoprim  40 mG/sulfamethoxazole 200 mG Suspension 80 milliGRAM(s) Enteral Tube daily  valGANciclovir 50 mG/mL Oral Solution 450 milliGRAM(s) Oral <User Schedule>  valproate sodium  IVPB 750 milliGRAM(s) IV Intermittent every 8 hours    MEDICATIONS  (PRN):  acetaminophen    Suspension .. 975 milliGRAM(s) Enteral Tube every 6 hours PRN Mild Pain (1 - 3)    ALLERGIES/INTOLERANCES:  Allergies  No Known Allergies    Intolerances    VITALS & EXAMINATION:  Vital Signs Last 24 Hrs  T(C): 37 (2022 07:00), Max: 37.8 (2022 03:00)  T(F): 98.6 (2022 07:00), Max: 100 (2022 03:00)  HR: 52 (2022 10:00) (51 - 73)  BP: 89/47 (2022 20:30) (89/47 - 163/67)  BP(mean): 64 (2022 20:30) (64 - 112)  RR: 17 (2022 10:00) (16 - 34)  SpO2: 100% (2022 10:00) (97% - 100%)    Neurologic:  - Mental Status: Eyes closed, opens to loud verbal and noxious stimuli. No response to verbal stimulus and withdrawal to noxious stimuli.  - Cranial Nerves: Eyes closed, primary gaze. Pupils 2mm OU and minimally reactive. Face appears grossly symmetric although difficult to adequately assess due to intubation.  - Motor: No purposeful movements. Withdraws to noxious stimuli throughout extremities.  - Reflexes: 2+ in the R bicep and brachioradialis, trace in the L bicep and brachioradialis, knees b/l, and ankles. No clonus at ankle b/l. Plantar responses down bilaterally.  - Sensory: No grimace to noxious stimuli throughout extremities    LABORATORY:  CBC                       8.3    8.93  )-----------( 178      ( 2022 10:07 )             24.7     Chem 06-12    140  |  109<H>  |  70<H>  ----------------------------<  251<H>  4.7   |  18<L>  |  2.09<H>    Ca    8.4      2022 00:50  Phos  5.0     06-12  Mg     1.7     06-12    TPro  5.5<L>  /  Alb  2.3<L>  /  TBili  0.3  /  DBili  0.1  /  AST  15  /  ALT  10  /  AlkPhos  82  06-11    LFTs LIVER FUNCTIONS - ( 2022 21:08 )  Alb: 2.3 g/dL / Pro: 5.5 g/dL / ALK PHOS: 82 U/L / ALT: 10 U/L / AST: 15 U/L / GGT: x           Coagulopathy PT/INR - ( 2022 00:49 )   PT: 21.3 sec;   INR: 1.83 ratio         PTT - ( 2022 00:49 )  PTT:30.7 sec  Lipid Panel   A1c   Cardiac enzymes CARDIAC MARKERS ( 2022 01:29 )  x     / x     / <10 U/L / x     / 2.1 ng/mL      U/A Urinalysis Basic - ( 10 Raffi 2022 22:28 )    Color: Light Yellow / Appearance: Clear / S.016 / pH: x  Gluc: x / Ketone: Negative  / Bili: Negative / Urobili: Negative   Blood: x / Protein: Trace / Nitrite: Negative   Leuk Esterase: Negative / RBC: 25 /hpf / WBC 5 /HPF   Sq Epi: x / Non Sq Epi: 1 /hpf / Bacteria: Negative      CSF  Immunological  Other    STUDIES & IMAGING:  Studies (EKG, EEG, EMG, etc):   EEG Summary / Classification:  Abnormal EEG   - Left fronto-temporal slowing  - Moderate generalized slowing.    EEG Impression / Clinical Correlate:  Abnormal EEG study.  Structural or functional abnormality in the left fronto-temporal region  Moderate to severe nonspecific diffuse or multifocal cerebral dysfunction.   No epileptiform pattern or seizure seen.    In absence of other clinical findings, can discontinue video EEG at this time.    Gurwinder Carpenter MD  Attending Physician, [facility]      Radiology (XR, CT, MR, U/S, TTE/DINAH):

## 2022-06-12 NOTE — PROGRESS NOTE ADULT - SUBJECTIVE AND OBJECTIVE BOX
[facility] DIVISION OF KIDNEY DISEASES AND HYPERTENSION -- FOLLOW UP NOTE  --------------------------------------------------------------------------------  Authored by: Ozzie Nova   Cell # [phone]     CHAD DUNBAR was seen and examined at bedside.   Patient is a 66y old  Male who presents with a chief complaint of Status Epilepticus (06-12-22)    24 hour events/subjective: Extubated yesterday but had to be reintubated for respiratory distress in the setting of large right hemothorax. Had hemorrhagic shock with SBP 80's, Received 5 units PRBC, 2 units of FFP. Also given protamine for reversal of heparin. Chest tube inserted. Off all anticoagulation. On low dose phenylephrine. Chest tube drained 1000 ml bloody fluid. UOP declined to 5-10 cc/hour. Therapeutic on VPA, also on keppra. No seizures on EEG.       Standing Inpatient Medications  atorvastatin 40 milliGRAM(s) Oral at bedtime  cefepime   IVPB 1000 milliGRAM(s) IV Intermittent every 24 hours  chlorhexidine 0.12% Liquid 15 milliLiter(s) Oral Mucosa every 12 hours  dexMEDEtomidine Infusion 0.1 MICROgram(s)/kG/Hr IV Continuous <Continuous>  fluconAZOLE   40 mG/mL Suspension 200 milliGRAM(s) Oral daily  insulin lispro (ADMELOG) corrective regimen sliding scale   SubCutaneous every 6 hours  levETIRAcetam  IVPB 250 milliGRAM(s) IV Intermittent every 12 hours  levothyroxine Injectable 40 MICROGram(s) IV Push at bedtime  magnesium oxide 400 milliGRAM(s) Oral every 8 hours  multivitamin/minerals/iron Oral Solution (CENTRUM) 15 milliLiter(s) Enteral Tube daily  pantoprazole  Injectable 40 milliGRAM(s) IV Push daily  phenylephrine    Infusion 0.6 MICROgram(s)/kG/Min IV Continuous <Continuous>  polyethylene glycol 3350 17 Gram(s) Oral daily  predniSONE   Tablet 5 milliGRAM(s) Oral daily  senna Syrup 10 milliLiter(s) Oral at bedtime  sodium chloride 0.9%. 1000 milliLiter(s) IV Continuous <Continuous>  tacrolimus    0.5 mG/mL Suspension 2 milliGRAM(s) Oral every 12 hours  trimethoprim  40 mG/sulfamethoxazole 200 mG Suspension 80 milliGRAM(s) Enteral Tube daily  valGANciclovir 50 mG/mL Oral Solution 450 milliGRAM(s) Oral <User Schedule>  valproate sodium  IVPB 750 milliGRAM(s) IV Intermittent every 8 hours    PRN Inpatient Medications  acetaminophen    Suspension .. 975 milliGRAM(s) Enteral Tube every 6 hours PRN        VITALS/PHYSICAL EXAM  --------------------------------------------------------------------------------  T(C): 37 (06-12-22 @ 07:00), Max: 37.8 (06-12-22 @ 03:00)  HR: 52 (06-12-22 @ 10:00) (51 - 73)  BP: 89/47 (06-11-22 @ 20:30) (89/47 - 163/67)  RR: 17 (06-12-22 @ 10:00) (16 - 34)  SpO2: 100% (06-12-22 @ 10:00) (97% - 100%)    Height (cm): 177.8 (06-10-22 @ 15:41)  Weight (kg): 61.7 (06-10-22 @ 18:31)  BMI (kg/m2): 19.5 (06-10-22 @ 18:31)  BSA (m2): 1.77 (06-10-22 @ 18:31)      06-11-22 @ 07:01  -  06-12-22 @ 07:00  --------------------------------------------------------  IN: 3294.6 mL / OUT: 2745 mL / NET: 549.6 mL    06-12-22 @ 07:01  -  06-12-22 @ 11:02  --------------------------------------------------------  IN: 258.6 mL / OUT: 75 mL / NET: 183.6 mL      Physical Exam:  Sedated, intubated   Right chest tube   Abdomen soft and non distended  RLQ wound well healed  Miranda in place clear yellow urine  No signs of fluid overload   	    LABS/STUDIES  --------------------------------------------------------------------------------              8.3    8.93  >-----------<  178      [06-12-22 @ 10:07]              24.7     140  |  109  |  70  ----------------------------<  251      [06-12-22 @ 00:50]  4.7   |  18  |  2.09        Ca     8.4     [06-12-22 @ 00:50]      Mg     1.7     [06-12-22 @ 00:50]      Phos  5.0     [06-12-22 @ 00:50]    TPro  5.5  /  Alb  2.3  /  TBili  0.3  /  DBili  0.1  /  AST  15  /  ALT  10  /  AlkPhos  82  [06-11-22 @ 21:08]    PT/INR: PT 21.3 , INR 1.83       [06-12-22 @ 00:49]  PTT: 30.7       [06-12-22 @ 00:49]    CK <10      [06-11-22 @ 01:29]        [06-11-22 @ 18:47]    Creatinine Trend:  SCr 2.09 [06-12 @ 00:50]  SCr 2.22 [06-11 @ 21:08]  SCr 2.17 [06-11 @ 18:47]  SCr 1.87 [06-11 @ 10:29]  SCr 1.90 [06-11 @ 01:29]    Tacrolimus (), Serum: 3.6 ng/mL (06-11 @ 06:44)      CAPILLARY BLOOD GLUCOSE  POCT Blood Glucose.: 207 mg/dL (12 Jun 2022 06:23)  POCT Blood Glucose.: 194 mg/dL (11 Jun 2022 18:25)      Urinalysis - [06-10-22 @ 22:28]      Color Light Yellow / Appearance Clear / SG 1.016 / pH 6.0      Gluc 100 mg/dL / Ketone Negative  / Bili Negative / Urobili Negative       Blood Small / Protein Trace / Leuk Est Negative / Nitrite Negative      RBC 25 / WBC 5 / Hyaline  / Gran  / Sq Epi  / Non Sq Epi 1 / Bacteria Negative    Urine Creatinine 38      [06-11-22 @ 09:32]  Urine Protein 18      [06-11-22 @ 09:32]  Urine Sodium 50      [06-11-22 @ 09:32]  Urine Potassium 10      [06-11-22 @ 09:32]  Urine Chloride <20      [06-11-22 @ 09:32]    Iron 17, TIBC 171, %sat 10      [05-02-22 @ 13:03]  Ferritin 1505      [05-02-22 @ 13:05]  PTH -- (Ca 9.2)      [02-05-22 @ 10:13]   294  HbA1c 6.0      [02-21-20 @ 08:53]  Lipid: chol 118, TG 54, HDL 59, LDL --      [06-27-21 @ 09:44]

## 2022-06-13 NOTE — PROGRESS NOTE ADULT - ASSESSMENT
67 y/o male w/ a PMHx of CAD s/p CABG, AF on Eliquis c/b CVA c/b seizures, HTN, HLD, DM type II, GI bleed due to a duodenal ulcer, and ESRD s/p DDRT c/b DGF requiring HD & large perinephric hematoma s/p evacuation w/ revision of arterial anastomosis who presented in status epilepticus requiring intubation with a hospital course c/b large right pleural effusion s/p thoracentesis c/b hemothorax while being anticoagulated s/p chest tube placement w/ CT demonstrating retained hemothorax    PLAN:    Neuro: seizure disorder, intubated  - Assess neurological status every 4 hours in the setting of seizures  - Keppra and valproic acid for seizures  - Appreciate neurology recommendations  - Sedation while intubated w/ propofol  - Pain control if needed w/ acetaminophen    Resp: intubated for airway protection, large right pleural effusion s/p thoracentesis c/b hemothorax while being anticoagulated s/p chest tube placement  - Mechanical ventilation as patient was intubated for airway protection  - Right chest tube to suction  - CT chest on 6/12 w/ retained hemothorax so will follow up with thoracic regarding need for VATS    CV:  - Will wean vasopressor support w/ goal MAP > 65 mmHg    GI:   -   - Bowel regimen with senna & Miralax  - Protonix for stress ulcer prophylaxis    Renal:  - Monitor I&Os and electrolytes w/ repletions as necessary    Heme:  - Monitor CBC and coags  - Lovenox for VTE prophylaxis    ID:   - Monitor for clinical evidence of active infection  - Monitor WBC, temperature, and procalcitonin  - Empiric antibiotics    Endo:   - Monitor glucose ; HgbA1C  - for HLD  - for hypothyroidism    Code Status:    Disposition:    Rebecca Yepez PA-C     [id number] 65 y/o male w/ a PMHx of CAD s/p CABG, AF on Eliquis c/b CVA c/b seizures, HTN, HLD, DM type II, hypothyroidism, GI bleed due to a duodenal ulcer, and ESRD s/p DDRT c/b DGF requiring HD & large perinephric hematoma s/p evacuation w/ revision of arterial anastomosis who presented in status epilepticus requiring intubation with a hospital course c/b large right pleural effusion s/p thoracentesis c/b hemothorax while being anticoagulated s/p chest tube placement w/ CT demonstrating retained hemothorax    PLAN:    Neuro: 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, intubated  - Assess neurological status every 4 hours in the setting of seizures  - Keppra and valproic acid for seizures  - Appreciate neurology recommendations  - Sedation while intubated w/ propofol  - Pain control if needed w/ acetaminophen    Resp: intubated for airway protection, large right pleural effusion s/p thoracentesis c/b hemothorax while being anticoagulated s/p chest tube placement  - Mechanical ventilation as patient was intubated for airway protection  - Right chest tube to suction  - CT chest on 6/12 w/ retained hemothorax so will follow up with thoracic regarding need for VATS    CV: hypotensive likely secondary to sedation, CAD, AF  - Will wean vasopressor support w/ goal MAP > 65 mmHg    GI: no acute issues  - NPO w/ OGT to LCWS; if unable to extubate, will start tube feeds  - Bowel regimen with senna & Miralax  - Protonix for stress ulcer prophylaxis    Renal: s/p DDRT  - NS at 75 mL/hr while NPO  - Tacrolimus and steroids for immunosuppression in the setting of DDRT    Heme: no acute issues  - Monitor CBC and coags  - Venodynes for mechanical VTE prophylaxis; holding chemical VTE prophylaxis in the setting of recent hemothorax    ID: UTI, immunosuppression  - Monitor WBC, temperature, and procalcitonin  - Empiric antibiotics w/ cefepime for UTI but all cultures no growth to date  - Fluconazole, Bactrim, and Valcyte for immunosuppression prophylaxis    Endo: DM type II, HLD, hypothyroidism  - Monitor glucose w/ ISS q4hrs for glycemic control; HgbA1C 6.6% on 4/24  - Atorvastatin for HLD  - Synthroid for hypothyroidism    Code Status: Full code    [facility]    Rebecca Yepez PA-C     [id number]

## 2022-06-13 NOTE — PROGRESS NOTE ADULT - PROBLEM SELECTOR PLAN 1
S/p DDRT on 4/21/22 Cr. stable in 2 range. ANA in the setting of hemodynamic instability 2/2 ATN. Donor was 58 , KDPI 82%, DCD, single vessels and ureter, HLA mismatch 1, 2, 2. No DSA, cPRA 0%. 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 Readmitted in May for anemia in the setting of large perinephric hematoma s/p evacuation and repair of arterial anastomosis on 5/2/22. Intra operative biopsy showed no rejection. As outpatient SCr. in th low 2 range. Admitted with lowest documented Cr. of 1.8 since transplant which has trended to 2.2 today. C/w cefepime for UTI, follow cultures. Optimize hemodynamics.

## 2022-06-13 NOTE — PROGRESS NOTE ADULT - ASSESSMENT
Mr. Castelan is a 66 yr old man with PMHx DM type II, HTN, CAD s/p CABG in 2020, Afib on Eliquis, CVA in 2019 due to Afib, Seizure d/o following CVA last episode was on 4/8/22, h/o GI bleed in 2/2022 EGD with duodenal ulcer,     ESRD s/p DDRT on 4/21/22 (course complicated by DGF, was on HD until 4/29/22, large perinephric hematoma s/p evacuation and repair of arterial anastomosis on 5/2/22) presented from rehab 6/10 with status epilepticus secondary to likely UTI, intubated for airway protection, s/p right thoracentesis complicated by hemothorax.  - 6/11 chest tube placed  6/12-  intubated sedated CT scan completed  6/13  - Intubated  for OR with Dr Jolanta bonner TBD       Mr. Castelan is a 66 yr old man with PMHx DM type II, HTN, CAD s/p CABG in 2020, Afib on Eliquis, CVA in 2019 due to Afib, Seizure d/o following CVA last episode was on 4/8/22, h/o GI bleed in 2/2022 EGD with duodenal ulcer,     ESRD s/p DDRT on 4/21/22 (course complicated by DGF, was on HD until 4/29/22, large perinephric hematoma s/p evacuation and repair of arterial anastomosis on 5/2/22) presented from rehab 6/10 with status epilepticus secondary to likely UTI, intubated for airway protection, s/p right thoracentesis complicated by hemothorax.  - 6/11 chest tube placed- 1liter drained   6/12-  intubated sedated CT scan completed  6/13  - Intubated   Chest tube 130/530 for OR with Dr Stover date TBD       Mr. Castelan is a 66 yr old man with PMHx DM type II, HTN, CAD s/p CABG in 2020, Afib on Eliquis, CVA in 2019 due to Afib, Seizure d/o following CVA last episode was on 4/8/22, h/o GI bleed in 2/2022 EGD with duodenal ulcer,     ESRD s/p DDRT on 4/21/22 (course complicated by DGF, was on HD until 4/29/22, large perinephric hematoma s/p evacuation and repair of arterial anastomosis on 5/2/22) presented from rehab 6/10 with status epilepticus secondary to likely UTI, intubated for airway protection, s/p right thoracentesis complicated by hemothorax.  - 6/11 chest tube placed- 1liter drained   6/12-  intubated sedated CT scan completed  6/13  - Intubated   Chest tube 130/530 for OR with Dr Stover date Wednesday Lizett 15 at 1330

## 2022-06-13 NOTE — PROGRESS NOTE ADULT - PROBLEM SELECTOR PLAN 2
C/w prograf 2mg bid. Obtain tacrolimus level 30min prior to AM dose. C/w prednisone 5mg daily. Hold CellCept for now. Consider changing tacro to belatacept since tacrolimus can lower seizure threshold. C/w bactrim, Valcyte, fluconazole 200mg daily (on fluconazole after repair of arterial anastomosis) for prophylaxis.    If you have any questions, please feel free to contact me  Ant Pantoja  Nephrology Fellow  [phone]; Prefer [employer] TEAMS  (After 5pm or on weekends please page the on-call fellow)

## 2022-06-13 NOTE — PROGRESS NOTE ADULT - SUBJECTIVE AND OBJECTIVE BOX
Transplant Surgery - Consult Note  --------------------------------------------------------------  DCD DDRT     Date: 4/21/22    Present:   Patient seen and examined with multidisciplinary team including Transplant Surgeon: Dr. David, Transplant Nephrologist: Dr. Kindra PRINCE,  nephrology fellow Dr. Pantoja, transplant pharmacist VENICE Lim, NP Jensen and unit RN during am rounds.  Disciplines not in attendance will be notified of the plan.     HPI: 66 yr old man with PMH: DM type II, HTN, CAD s/p CABG in 2020, Afib on Eliquis, CVA in 2019 due to Afib, Seizure d/o following CVA last episode was on 4/8/22, h/o GI bleed in 2/2022 EGD with duodenal ulcer.  ESRD due to DM was on HD since 2019, s/p DCD DDRT on 4/21/22.  Donor was 58 , KDPI 82%, DCD, single vessels and ureter, HLA mismatch 1, 2, 2. No DSA, cPRA 0%. 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 Was Readmitted in May for anemia in the setting of large perinephric hematoma s/p evacuation and repair of arterial anastomosis on 5/2/22. Intra operative biopsy showed no rejection, Creatinine ranging ~2mg/dL.  In Rehab: He was recently dx with klebsiella UTI rx with ertapenem - then switched to Levaquin day #6.    He also had parainfluenza pneumonia. Was at rehab progressing well.      Hospital course:  - 6/10: transferred from rehab facility for seizure status epilepticus. Intubated for respiratory protection and transferred to MICU. EEG done no seizure activity recorded.  Neuro following/adjusting meds  - 6/11: Extubated. Found to have pleural effusion on eliquis Underwent Thoracentesis 1.3L. eliquis changed to heparin drip, post procedure drop in H&H. 5u PRBC, 2 u FFP.    - 6/11 evening Transferred to SICU for further care in setting of hypoxia, significant R pleural effusion, hgb 6 and hemodynamic instability  - 6/11 Intubated at 9pm and sedated on Precedex.  CT placed, 600ml blood drained.  1L total overnight, started pressors  - 6/11 Received Protamine for PTT >100 (was on Heparin drip started for AF), 2 u FFP and 5u PRBC total    Interval Events:  - Lactate cleared.  NS @ 75  SCr 2.23   UOP 1.2L/24 hours  - Requiring Jay for BP support now off as of this am  - On Cefepime for UTI.  6/10 BCx ngtd   6/11 pleural fluid Cx: ngtd  - MMF held  - Minimal vent setting FIO2 30%    Immunosuppression:   Induction:        Thymoglobulin                                        Maintenance immunosuppression: Tacrolimus 1mg per ngt bid, MMF HELD, Pred 5  Ongoing monitoring for signs of rejection.    Potential Discharge date: pending clinical improvement    Education:  Medications    Plan of care:  See Below      MEDICATIONS  (STANDING):  atorvastatin 40 milliGRAM(s) Oral at bedtime  cefepime   IVPB 1000 milliGRAM(s) IV Intermittent every 24 hours  chlorhexidine 0.12% Liquid 15 milliLiter(s) Oral Mucosa every 12 hours  fluconAZOLE   40 mG/mL Suspension 200 milliGRAM(s) Oral daily  insulin lispro (ADMELOG) corrective regimen sliding scale   SubCutaneous every 4 hours  levETIRAcetam  IVPB 250 milliGRAM(s) IV Intermittent every 12 hours  levothyroxine Injectable 40 MICROGram(s) IV Push at bedtime  magnesium oxide 400 milliGRAM(s) Oral every 8 hours  multivitamin/minerals/iron Oral Solution (CENTRUM) 15 milliLiter(s) Enteral Tube daily  pantoprazole  Injectable 40 milliGRAM(s) IV Push daily  polyethylene glycol 3350 17 Gram(s) Oral daily  predniSONE   Tablet 5 milliGRAM(s) Oral daily  propofol Infusion 10 MICROgram(s)/kG/Min (3.7 mL/Hr) IV Continuous <Continuous>  senna Syrup 10 milliLiter(s) Oral at bedtime  sodium chloride 0.9%. 1000 milliLiter(s) (25 mL/Hr) IV Continuous <Continuous>  tacrolimus    0.5 mG/mL Suspension 1 milliGRAM(s) Oral <User Schedule>  trimethoprim  40 mG/sulfamethoxazole 200 mG Suspension 80 milliGRAM(s) Enteral Tube <User Schedule>  valGANciclovir 50 mG/mL Oral Solution 450 milliGRAM(s) Oral <User Schedule>  valproate sodium  IVPB 750 milliGRAM(s) IV Intermittent every 8 hours    MEDICATIONS  (PRN):  acetaminophen    Suspension .. 975 milliGRAM(s) Enteral Tube every 6 hours PRN Mild Pain (1 - 3)  HYDROmorphone  Injectable 0.5 milliGRAM(s) IV Push every 4 hours PRN Moderate Pain (4 - 6)      PAST MEDICAL & SURGICAL HISTORY:  Hypertension  Diabetes  Dyslipidemia  CAD (Coronary Artery Disease) with Stents in 06/2009 and 6/2019, s/p off-pump C3L on 8/13/20  Hypothyroidism  CVA (cerebral vascular accident) 12/13/19 with residual bilateral weakness  Anemia  PEG (percutaneous endoscopic gastrostomy) status removed July 2020  Intubation of airway performed without difficulty Dec 2019  CVA c/b status epilepticus requiring intubation and PEG in 12/2019-  ESRD on dialysis M/W/F  Seizures after CVA, last seizure was last week 4/07/22  History of insertion of stent into coronary artery bypass graft 2009 and 2019  S/P CABG x 3 off pump C3L on 8/13/20      Vital Signs Last 24 Hrs  T(C): 36 (13 Jun 2022 11:00), Max: 37.2 (13 Jun 2022 03:00)  T(F): 96.8 (13 Jun 2022 11:00), Max: 99 (13 Jun 2022 03:00)  HR: 61 (13 Jun 2022 11:00) (47 - 68)  BP: 121/60 (12 Jun 2022 17:15) (121/60 - 121/60)  BP(mean): 87 (12 Jun 2022 17:15) (87 - 87)  RR: 21 (13 Jun 2022 11:00) (14 - 30)  SpO2: 100% (13 Jun 2022 11:00) (100% - 100%)    I&O's Summary    12 Jun 2022 07:01  -  13 Jun 2022 07:00  --------------------------------------------------------  IN: 2485.4 mL / OUT: 1710 mL / NET: 775.4 mL    13 Jun 2022 07:01  -  13 Jun 2022 11:02  --------------------------------------------------------  IN: 301.9 mL / OUT: 130 mL / NET: 171.9 mL                              7.7    10.35 )-----------( 200      ( 13 Jun 2022 05:59 )             22.7     06-13    143  |  114<H>  |  63<H>  ----------------------------<  134<H>  4.2   |  17<L>  |  2.23<H>    Ca    8.0<L>      13 Jun 2022 05:59  Phos  3.3     06-13  Mg     2.1     06-13    TPro  5.5<L>  /  Alb  2.3<L>  /  TBili  0.3  /  DBili  0.1  /  AST  15  /  ALT  10  /  AlkPhos  82  06-11          Culture - Fungal, Body Fluid (collected 06-11-22 @ 02:42)  Source: .Body Fluid Pleural Fluid  Preliminary Report (06-13-22 @ 10:21):    Testing in progress    Culture - Body Fluid with Gram Stain (collected 06-11-22 @ 02:42)  Source: .Body Fluid Pleural Fluid  Gram Stain (06-11-22 @ 12:29):    Few polymorphonuclear leukocytes seen    No organisms seen  Preliminary Report (06-12-22 @ 07:20):    No growth    Culture - Blood (collected 06-10-22 @ 18:15)  Source: .Blood Blood  Preliminary Report (06-12-22 @ 01:02):    No growth to date.    Culture - Blood (collected 06-10-22 @ 18:00)  Source: .Blood Blood  Preliminary Report (06-12-22 @ 01:02):    No growth to date.         Review of systems  Unable to obtain   Intubated/Sedated      PHYSICAL EXAM:  Constitutional: Well developed / well nourished  Eyes: Anicteric, PERRLA  ENMT: Intubated  Neck: supple  Respiratory: CTA anterior. R CT to sxn with bloody drainage  Cardiovascular: RRR  Gastrointestinal: Soft, non distended, NT, incision healed   Genitourinary: tucker  Extremities: SCD's in place and working bilaterally, no edema  Vascular: Palpable dp pulses bilaterally  Neurological: sedated on precedex. Opened eyes to name  Skin: no rashes, ulcerations or lesions  Musculoskeletal: Moving all extremities  Psychiatric: sedated

## 2022-06-13 NOTE — PROGRESS NOTE ADULT - ASSESSMENT
67yo man with PMH of CVA (2019) with subsequent seizure disorder, ESRD s/p renal transplant (04/2022), afib (on apixaban), CAD (s/p stents), CABG (2020), HTN, HLD, DM presents to the ED with status epilepticus from [facility]. Semiology includes eyes deviated to the R, rhythmic R facial twitching as well as R shoulder clonic movements lasting approximately 30 seconds each. Of note patient is receiving antibiotics for Klebsiella UTI. Patient received Versed 5mg with EMS, Ativan 2mg and Keppra 1g load in the ED. Labs remarkable for VPA level 34. EEG from 04/2022 showed L frontocentral focal onset seizures. s/p intubation on 6/11. EEG negative for seizure but showed structural or functional abnormality in the left fronto-temporal region and moderate to severe nonspecific diffuse or multifocal cerebral dysfunction.     Impression: Status epilepticus likely provoked in the setting of UTI and insufficient AED coverage, with possible decreased seizure threshold in the setting of known focal motor seizures.    Recommendations:  [] s/p EEG  [] Telemetry monitoring  [] C/w Keppra 250mg IV q12h and  mg IV q8  [] Recheck valproic acid levels  [] Draw CMP to check AST/ALT levels   [] Neuro checks and vital signs per unit protocol  [] Seizure/fall/aspiration precautions  [] Advise against driving, operating heavy machinery, water sports/bathing, activity in elevation without appropriate safety precautions    Case seen and discussed with neurology attending Dr. Maria.     65yo man with PMH of CVA (2019) with subsequent seizure disorder, ESRD s/p renal transplant (04/2022), afib (on apixaban), CAD (s/p stents), CABG (2020), HTN, HLD, DM presents to the ED with status epilepticus from [facility]. Semiology includes eyes deviated to the R, rhythmic R facial twitching as well as R shoulder clonic movements lasting approximately 30 seconds each. Of note patient is receiving antibiotics for Klebsiella UTI. Patient received Versed 5mg with EMS, Ativan 2mg and Keppra 1g load in the ED. Labs remarkable for VPA level 34. EEG from 04/2022 showed L frontocentral focal onset seizures. s/p intubation on 6/11. EEG negative for seizure but showed structural or functional abnormality in the left fronto-temporal region and moderate to severe nonspecific diffuse or multifocal cerebral dysfunction.     Impression: Status epilepticus likely provoked in the setting of UTI and insufficient AED coverage, with possible decreased seizure threshold in the setting of known focal motor seizures.    Recommendations:  [] s/p EEG  [] Telemetry monitoring  [] C/w Keppra 250mg IV q12h and  mg IV q8  [] Recheck valproic acid levels today prior to evening dose  [] Draw CBC and CMP   [] Neuro checks and vital signs per unit protocol  [] Seizure/fall/aspiration precautions  [] Advise against driving, operating heavy machinery, water sports/bathing, activity in elevation without appropriate safety precautions    Case seen and discussed with neurology attending Dr. Maria.

## 2022-06-13 NOTE — PROGRESS NOTE ADULT - ASSESSMENT
66 yr old man with h/o DM type II, HTN, CAD s/p CABG in 2020, Afib on Eliquis, CVA in 2019 due to Afib, Seizure d/o (last episode was on 4/8/22), h/o GI bleed in 2/2022 EGD with duodenal ulcer and ESRD on HD s/p DDRT from [facility] donor on 4/21/22 complicated by DGF requiring HD until 4/29/22 now with downtrending creatinine who presented with status epilepticus in setting of UTI/antibiotics and sub-therapeutic valproic acid level     Seizure  - status epilepticus in setting of UTI/antibiotics and sub-therapeutic valproic acid level   - No seizure activity recorded on EEG.  EEG disconnected  - On Keppra and Valproic acid, adjusted by Neuro  - Appreciate Neurology recommendations    Hypoxemis resp failure:  - Intubated for hypoxemia/respiratory distress and significant R hemothorax requiring CT placement 6/11  - Vent and CT management as per SICU  - Thoracic follow up on plan   - H&H trending down 7.7/22.7 one u PRBC today (hemorrhagic shock S/P 5 PRBC, 2 FFP and Protamine for reversal of Heparin)    DCD DDRT 4/21/22  - Weaned off Phenylephrine keep MAP >65  - Creatinine rising in setting of hypotension, decrease IVF to 25ml/hr.   - Strict I/Os    - Immunosuppression:  - Prograf 1mg suspension BID via NGT. Hold MMF. Continue prednisone 5mg daily.   - PPX: change Bactrim to 3 x w, Valcyte (MWF), Fluconazole 200mg daily (on fluconazole after repair of arterial anastomosis)  - Q8 labs, Daily Tacrolimus level daily in AM prior to dose    Klebsiella UTI  - Continue Cefepime  - Blood Cx  6/10 ngtd  - ID consult    DM  -Fingersticks q6 hours while NPO with SSI coverage

## 2022-06-13 NOTE — PROGRESS NOTE ADULT - SUBJECTIVE AND OBJECTIVE BOX
HISTORY:  67 y/o male w/ a PMHx of CAD s/p CABG (2020), AF on Eliquis c/b CVA (2019) c/b seizures, HTN, HLD, DM type II, GI bleed due to a duodenal ulcer (2/2022), and ESRD s/p DDRT (4/21/22) c/b DGF requiring HD (last session 4/29/22) & large perinephric hematoma s/p evacuation w/ revision of arterial anastomosis (5/2/22) who presented on 6/10 for status epilepticus. Patient was intubated for airway protection and transferred to MICU for further management. Patient was loaded w/ valproic acid and continued his home Keppra w/ resolution of his seizures on EEG so he was extubated on 6/11. Patient was noted to have a large right pleural effusion so thoracentesis was performed w/ 1.3 L of serosanguineous fluid drained. Patient was started on a heparin infusion for his atrial fibrillation post-procedure but had a H&H drop w/ CXR demonstrating complete right lung whiteout concerning for a hemothorax. Patient was transferred to SICU for further management. He was transfused several products & reversed w/ protamine. A right 28 Fr chest tube was placed w/ a significant amount of sanguineous fluid drained and quality slowly improved to serosanguineous. Unable to obtain ROS as patient is intubated & sedated.    24 HOUR EVENTS:  - Valproic acid level 28  - EEG negative for seizures so d/darren  - Episodes of agitation despite max dose of Precedex so transitioned to propofol  - CT chest obtained, which demonstrates retained hemothorax  - Decreased tacrolimus from 2 mg PO BID -> 1 mg PO BID  - Right renal transplant Duplex w/ small free fluid near upper pole and elevated resistive indices w/ increased renal cortical echogenicity  - HCT remained stable at 25.6 -> 24.7 -> 24.2  - Hyperglycemic so adjusted ISS from q6hrs -> q4hrs    NEURO  RASS (if intubated): +1 to -2  Exam: sedated, opens eyes to voice, intermittently agitated, follows commands in all four extremities  Meds:  - acetaminophen    Suspension .. 975 milliGRAM(s) Enteral Tube every 6 hours PRN Mild Pain (1 - 3)  - levETIRAcetam  IVPB 250 milliGRAM(s) IV Intermittent every 12 hours  - propofol Infusion 10 MICROgram(s)/kG/Min IV Continuous <Continuous>  - valproate sodium  IVPB 750 milliGRAM(s) IV Intermittent every 8 hours    RESPIRATORY  RR: 21 (06-13-22 @ 05:30) (14 - 30)  SpO2: 100% (06-13-22 @ 05:30) (100% - 100%)  Exam: clear to auscultation bilaterally  Mechanical Ventilation: Mode: AC/ CMV (Assist Control/ Continuous Mandatory Ventilation), RR (machine): 16, RR (patient): 20, TV (machine): 450, FiO2: 30, PEEP: 5, ITime: 1, MAP: 10, PIP: 23  ABG - ( 12 Jun 2022 13:30 )  pH: 7.39  /  pCO2: 29    /  pO2: 136   / HCO3: 18    / Base Excess: -6.5  /  SaO2: 99.1  /  Lactate: 0.8    CARDIOVASCULAR  HR: 62 (06-13-22 @ 05:30) (47 - 64)  BP: 121/60 (06-12-22 @ 17:15) (121/60 - 121/60)  BP(mean): 87 (06-12-22 @ 17:15) (87 - 87)  ABP: 143/35 (06-13-22 @ 05:30) (38/26 - 147/42)  ABP(mean): 66 (06-13-22 @ 05:30) (32 - 83)  Exam: regular rate and rhythm, S1S2  Cardiac Rhythm: sinus  Perfusion    [x]Adequate    [ ]Inadequate  Mentation   [ ]Normal       [x]Reduced  Extremities  [x]Warm         [ ]Cool  Volume Status [ ]Hypervolemic [x]Euvolemic [ ]Hypovolemic  Meds: phenylephrine    Infusion 0.6 MICROgram(s)/kG/Min IV Continuous <Continuous>    GI/NUTRITION  Exam: soft, nontender, nondistended, RLQ incision well-healed, OGT to LCWS  Diet: NPO except medications  Meds:  - pantoprazole  Injectable 40 milliGRAM(s) IV Push daily  - polyethylene glycol 3350 17 Gram(s) Oral daily  - senna Syrup 10 milliLiter(s) Oral at bedtime    GENITOURINARY  I&O's Detail  06-12 @ 07:01  -  06-13 @ 05:39  --------------------------------------------------------  IN:    Dexmedetomidine: 35.4 mL    Enteral Tube Flush: 60 mL    IV PiggyBack: 200 mL    Phenylephrine: 131.8 mL    Propofol: 44.4 mL    sodium chloride 0.9%: 1650 mL  Total IN: 2121.6 mL    OUT:    Chest Tube (mL): 400 mL    Indwelling Catheter - Urethral (mL): 1030 mL  Total OUT: 1430 mL    Total NET: 691.6 mL    142  |  113<H>  |  66<H>  ----------------------------<  162<H>  4.6   |  17<L>  |  2.31<H>    Ca    8.2<L>      13 Jun 2022 00:41  Phos  4.0  Mg     2.2  TPro  5.5<L>  /  Alb  2.3<L>  /  TBili  0.3  /  DBili  0.1  /  AST  15  /  ALT  10  /  AlkPhos  82    Meds: sodium chloride 0.9% infuse at 75 mL/hr    HEMATOLOGIC                        8.4    9.80  )-----------( 196      ( 12 Jun 2022 13:41 )             24.2     PT/INR - ( 12 Jun 2022 13:41 )   PT: 18.7 sec;   INR: 1.62 ratio         PTT - ( 12 Jun 2022 13:41 )  PTT:32.7 sec    INFECTIOUS DISEASES  T(C): 37.2 (06-13-22 @ 03:00), Max: 37.2 (06-13-22 @ 03:00)  Wt(kg): --  WBC Count: 9.80 K/uL (06-12 @ 13:41)  WBC Count: 8.93 K/uL (06-12 @ 10:07)    Recent Cultures:  Specimen Source: .Body Fluid Pleural Fluid, 06-11 @ 02:42; Results   No growth; Gram Stain:   Few polymorphonuclear leukocytes seen  No organisms seen; Organism: --  Specimen Source: .Blood Blood, 06-10 @ 18:15; Results   No growth to date.; Gram Stain: --; Organism: --  Specimen Source: .Blood Blood, 06-10 @ 18:00; Results   No growth to date.; Gram Stain: --; Organism: --    Meds: cefepime   IVPB 1000 milliGRAM(s) IV Intermittent every 24 hours  fluconAZOLE   40 mG/mL Suspension 200 milliGRAM(s) Oral daily  tacrolimus    0.5 mG/mL Suspension 1 milliGRAM(s) Oral <User Schedule>  trimethoprim  40 mG/sulfamethoxazole 200 mG Suspension 80 milliGRAM(s) Enteral Tube daily  valGANciclovir 50 mG/mL Oral Solution 450 milliGRAM(s) Oral <User Schedule>      ENDOCRINE  Capillary Blood Glucose    Meds: atorvastatin 40 milliGRAM(s) Oral at bedtime  insulin lispro (ADMELOG) corrective regimen sliding scale   SubCutaneous every 4 hours  levothyroxine Injectable 40 MICROGram(s) IV Push at bedtime  predniSONE   Tablet 5 milliGRAM(s) Oral daily      ACCESS DEVICES:  [ ] Peripheral IV  [ ] Central Venous Line		[ ] R	[ ] L	[ ] IJ	[ ] Fem	[ ] SC	Placed:   [ ] Arterial Line			[ ] R	[ ] L	[ ] Fem	[ ] Rad	[ ] Ax	Placed:   [ ] PICC:					[ ] Mediport  [ ] Urinary Catheter, Date Placed:   [ ] Necessity of urinary, arterial, and venous catheters discussed    OTHER MEDICATIONS:  chlorhexidine 0.12% Liquid 15 milliLiter(s) Oral Mucosa every 12 hours  magnesium oxide 400 milliGRAM(s) Oral every 8 hours  multivitamin/minerals/iron Oral Solution (CENTRUM) 15 milliLiter(s) Enteral Tube daily      IMAGING: HISTORY:  65 y/o male w/ a PMHx of CAD s/p CABG (2020), AF on Eliquis c/b CVA (2019) c/b seizures, HTN, HLD, DM type II, GI bleed due to a duodenal ulcer (2/2022), and ESRD s/p DDRT (4/21/22) c/b DGF requiring HD (last session 4/29/22) & large perinephric hematoma s/p evacuation w/ revision of arterial anastomosis (5/2/22) who presented on 6/10 for status epilepticus. Patient was intubated for airway protection and transferred to MICU for further management. Patient was loaded w/ valproic acid and continued his home Keppra w/ resolution of his seizures on EEG so he was extubated on 6/11. Patient was noted to have a large right pleural effusion so thoracentesis was performed w/ 1.3 L of serosanguineous fluid drained. Patient was started on a heparin infusion for his atrial fibrillation post-procedure but had a H&H drop w/ CXR demonstrating complete right lung whiteout concerning for a hemothorax. Patient was transferred to SICU for further management. He was transfused several products & reversed w/ protamine. A right 28 Fr chest tube was placed w/ a significant amount of sanguineous fluid drained and quality slowly improved to serosanguineous. Unable to obtain ROS as patient is intubated & sedated.    24 HOUR EVENTS:  - Valproic acid level 28  - EEG negative for seizures so d/darren  - Episodes of agitation despite max dose of Precedex so transitioned to propofol  - CT chest obtained, which demonstrates retained hemothorax  - Decreased tacrolimus from 2 mg PO BID -> 1 mg PO BID  - Right renal transplant Duplex w/ small free fluid near upper pole and elevated resistive indices w/ increased renal cortical echogenicity  - HCT remained stable at 25.6 -> 24.7 -> 24.2  - Hyperglycemic so adjusted ISS from q6hrs -> q4hrs    NEURO  RASS (if intubated): +1 to -2  Exam: sedated, opens eyes to voice, intermittently agitated, follows commands in all four extremities  Meds:  - acetaminophen    Suspension .. 975 milliGRAM(s) Enteral Tube every 6 hours PRN Mild Pain (1 - 3)  - levETIRAcetam  IVPB 250 milliGRAM(s) IV Intermittent every 12 hours  - propofol Infusion 10 MICROgram(s)/kG/Min IV Continuous <Continuous>  - valproate sodium  IVPB 750 milliGRAM(s) IV Intermittent every 8 hours    RESPIRATORY  RR: 21 (06-13-22 @ 05:30) (14 - 30)  SpO2: 100% (06-13-22 @ 05:30) (100% - 100%)  Exam: clear to auscultation bilaterally  Mechanical Ventilation: Mode: AC/ CMV (Assist Control/ Continuous Mandatory Ventilation), RR (machine): 16, RR (patient): 20, TV (machine): 450, FiO2: 30, PEEP: 5, ITime: 1, MAP: 10, PIP: 23  ABG - ( 12 Jun 2022 13:30 )  pH: 7.39  /  pCO2: 29    /  pO2: 136   / HCO3: 18    / Base Excess: -6.5  /  SaO2: 99.1  /  Lactate: 0.8    CARDIOVASCULAR  HR: 62 (06-13-22 @ 05:30) (47 - 64)  BP: 121/60 (06-12-22 @ 17:15) (121/60 - 121/60)  BP(mean): 87 (06-12-22 @ 17:15) (87 - 87)  ABP: 143/35 (06-13-22 @ 05:30) (38/26 - 147/42)  ABP(mean): 66 (06-13-22 @ 05:30) (32 - 83)  Exam: regular rate and rhythm, S1S2  Cardiac Rhythm: sinus  Perfusion    [x]Adequate    [ ]Inadequate  Mentation   [ ]Normal       [x]Reduced  Extremities  [x]Warm         [ ]Cool  Volume Status [ ]Hypervolemic [x]Euvolemic [ ]Hypovolemic  Meds: phenylephrine    Infusion 0.6 MICROgram(s)/kG/Min IV Continuous <Continuous>    GI/NUTRITION  Exam: soft, nontender, nondistended, RLQ incision well-healed, OGT to LCWS  Diet: NPO except medications  Meds:  - pantoprazole  Injectable 40 milliGRAM(s) IV Push daily  - polyethylene glycol 3350 17 Gram(s) Oral daily  - senna Syrup 10 milliLiter(s) Oral at bedtime    GENITOURINARY  I&O's Detail  06-12 @ 07:01  -  06-13 @ 05:39  --------------------------------------------------------  IN:    Dexmedetomidine: 35.4 mL    Enteral Tube Flush: 60 mL    IV PiggyBack: 200 mL    Phenylephrine: 131.8 mL    Propofol: 44.4 mL    sodium chloride 0.9%: 1650 mL  Total IN: 2121.6 mL    OUT:    Chest Tube (mL): 400 mL    Indwelling Catheter - Urethral (mL): 1030 mL  Total OUT: 1430 mL    Total NET: 691.6 mL    142  |  113<H>  |  66<H>  ----------------------------<  162<H>  4.6   |  17<L>  |  2.31<H>    Ca    8.2<L>      13 Jun 2022 00:41  Phos  4.0  Mg     2.2  TPro  5.5<L>  /  Alb  2.3<L>  /  TBili  0.3  /  DBili  0.1  /  AST  15  /  ALT  10  /  AlkPhos  82    Meds:  - predniSONE   Tablet 5 milliGRAM(s) Oral daily  - sodium chloride 0.9% infuse at 75 mL/hr  - tacrolimus    0.5 mG/mL Suspension 1 milliGRAM(s) Oral <User Schedule>    HEMATOLOGIC                        8.4    9.80  )-----------( 196      ( 12 Jun 2022 13:41 )             24.2     PT/INR - ( 12 Jun 2022 13:41 )   PT: 18.7 sec;   INR: 1.62 ratio    PTT - ( 12 Jun 2022 13:41 )  PTT:32.7 sec    INFECTIOUS DISEASES  T(C): 37.2 (06-13-22 @ 03:00), Max: 37.2 (06-13-22 @ 03:00)  WBC Count:  - 9.80 K/uL (06-12 @ 13:41)  - 8.93 K/uL (06-12 @ 10:07)    Recent Cultures:  - Specimen Source: .Body Fluid Pleural Fluid, 06-11 @ 02:42  Results: No growth  Gram Stain: Few polymorphonuclear leukocytes seen. No organisms seen.  Organism: --  - Specimen Source: .Blood Blood, 06-10 @ 18:15  Results: No growth to date.  Gram Stain: --  Organism: --  - Specimen Source: .Blood Blood, 06-10 @ 18:00  Results: No growth to date.  Gram Stain: --  Organism: --    Meds:  - cefepime   IVPB 1000 milliGRAM(s) IV Intermittent every 24 hours  - fluconAZOLE   40 mG/mL Suspension 200 milliGRAM(s) Oral daily  - trimethoprim  40 mG/sulfamethoxazole 200 mG Suspension 80 milliGRAM(s) Enteral Tube daily  - valGANciclovir 50 mG/mL Oral Solution 450 milliGRAM(s) Oral <User Schedule>    ENDOCRINE  Capillary Blood Glucose:  - 137 mg/dL (13 Jun 2022 05:51)  - 167 mg/dL (13 Jun 2022 01:41)  - 231 mg/dL (12 Jun 2022 18:16)  - 118 mg/dL (12 Jun 2022 11:13)  - 207 mg/dL (12 Jun 2022 06:23)    Meds:  - atorvastatin 40 milliGRAM(s) Oral at bedtime  - insulin lispro (ADMELOG) corrective regimen sliding scale   SubCutaneous every 4 hours  - levothyroxine Injectable 40 MICROGram(s) IV Push at bedtime    ACCESS DEVICES:  [x] Peripheral IV  [ ] Central Venous Line		[ ] R	[ ] L	[ ] IJ	[ ] Fem	[ ] SC	Placed:   [x] Arterial Line			[x] R	[ ] L	[ ] Fem	[x] Rad	[ ] Ax	Placed: 6/11  [ ] PICC:					[ ] Mediport  [x] Urinary Catheter, Date Placed: 6/12  [x] Necessity of urinary, arterial, and venous catheters discussed    OTHER MEDICATIONS:  - chlorhexidine 0.12% Liquid 15 milliLiter(s) Oral Mucosa every 12 hours  - magnesium oxide 400 milliGRAM(s) Oral every 8 hours  - multivitamin/minerals/iron Oral Solution (CENTRUM) 15 milliLiter(s) Enteral Tube daily    IMAGING: HISTORY:  67 y/o male w/ a PMHx of CAD s/p CABG (2020), AF on Eliquis c/b CVA (2019) c/b seizures, HTN, HLD, DM type II, hypothyroidism, GI bleed due to a duodenal ulcer (2/2022), and ESRD s/p DDRT (4/21/22) c/b DGF requiring HD (last session 4/29/22) & large perinephric hematoma s/p evacuation w/ revision of arterial anastomosis (5/2/22) who presented on 6/10 for status epilepticus. Patient was intubated for airway protection and transferred to MICU for further management. Patient was loaded w/ valproic acid and continued his home Keppra w/ resolution of his seizures on EEG so he was extubated on 6/11. Patient was noted to have a large right pleural effusion so thoracentesis was performed w/ 1.3 L of serosanguineous fluid drained. Patient was started on a heparin infusion for his atrial fibrillation post-procedure but had a H&H drop w/ CXR demonstrating complete right lung whiteout concerning for a hemothorax. Patient was transferred to SICU for further management. He was transfused several products & reversed w/ protamine. A right 28 Fr chest tube was placed w/ a significant amount of sanguineous fluid drained and quality slowly improved to serosanguineous. Unable to obtain ROS as patient is intubated & sedated.    24 HOUR EVENTS:  - Valproic acid level 28  - EEG negative for seizures so d/darren  - Episodes of agitation despite max dose of Precedex so transitioned to propofol  - CT chest obtained, which demonstrates retained hemothorax  - Decreased tacrolimus from 2 mg PO BID -> 1 mg PO BID  - Right renal transplant Duplex w/ small free fluid near upper pole and elevated resistive indices w/ increased renal cortical echogenicity  - HCT remained stable at 25.6 -> 24.7 -> 24.2  - Hyperglycemic so adjusted ISS from q6hrs -> q4hrs    NEURO  RASS (if intubated): +1 to -2  Exam: sedated, opens eyes to voice, intermittently agitated, follows commands in all four extremities  Meds:  - acetaminophen    Suspension .. 975 milliGRAM(s) Enteral Tube every 6 hours PRN Mild Pain (1 - 3)  - levETIRAcetam  IVPB 250 milliGRAM(s) IV Intermittent every 12 hours  - propofol Infusion 10 MICROgram(s)/kG/Min IV Continuous <Continuous>  - valproate sodium  IVPB 750 milliGRAM(s) IV Intermittent every 8 hours    RESPIRATORY  RR: 21 (06-13-22 @ 05:30) (14 - 30)  SpO2: 100% (06-13-22 @ 05:30) (100% - 100%)  Exam: clear to auscultation bilaterally  Mechanical Ventilation: Mode: AC/ CMV (Assist Control/ Continuous Mandatory Ventilation), RR (machine): 16, RR (patient): 20, TV (machine): 450, FiO2: 30, PEEP: 5, ITime: 1, MAP: 10, PIP: 23  ABG - ( 12 Jun 2022 13:30 )  pH: 7.39  /  pCO2: 29    /  pO2: 136   / HCO3: 18    / Base Excess: -6.5  /  SaO2: 99.1  /  Lactate: 0.8    CARDIOVASCULAR  HR: 62 (06-13-22 @ 05:30) (47 - 64)  BP: 121/60 (06-12-22 @ 17:15) (121/60 - 121/60)  BP(mean): 87 (06-12-22 @ 17:15) (87 - 87)  ABP: 143/35 (06-13-22 @ 05:30) (38/26 - 147/42)  ABP(mean): 66 (06-13-22 @ 05:30) (32 - 83)  Exam: regular rate and rhythm, S1S2  Cardiac Rhythm: sinus  Perfusion    [x]Adequate    [ ]Inadequate  Mentation   [ ]Normal       [x]Reduced  Extremities  [x]Warm         [ ]Cool  Volume Status [ ]Hypervolemic [x]Euvolemic [ ]Hypovolemic  Meds: phenylephrine    Infusion 0.6 MICROgram(s)/kG/Min IV Continuous <Continuous>    GI/NUTRITION  Exam: soft, nontender, nondistended, RLQ incision well-healed, OGT to LCWS  Diet: NPO except medications  Meds:  - pantoprazole  Injectable 40 milliGRAM(s) IV Push daily  - polyethylene glycol 3350 17 Gram(s) Oral daily  - senna Syrup 10 milliLiter(s) Oral at bedtime    GENITOURINARY  I&O's Detail  06-12 @ 07:01  -  06-13 @ 05:39  --------------------------------------------------------  IN:    Dexmedetomidine: 35.4 mL    Enteral Tube Flush: 60 mL    IV PiggyBack: 200 mL    Phenylephrine: 131.8 mL    Propofol: 44.4 mL    sodium chloride 0.9%: 1650 mL  Total IN: 2121.6 mL    OUT:    Chest Tube (mL): 400 mL    Indwelling Catheter - Urethral (mL): 1030 mL  Total OUT: 1430 mL    Total NET: 691.6 mL    142  |  113<H>  |  66<H>  ----------------------------<  162<H>  4.6   |  17<L>  |  2.31<H>    Ca    8.2<L>      13 Jun 2022 00:41  Phos  4.0  Mg     2.2  TPro  5.5<L>  /  Alb  2.3<L>  /  TBili  0.3  /  DBili  0.1  /  AST  15  /  ALT  10  /  AlkPhos  82    Meds:  - predniSONE   Tablet 5 milliGRAM(s) Oral daily  - sodium chloride 0.9% infuse at 75 mL/hr  - tacrolimus    0.5 mG/mL Suspension 1 milliGRAM(s) Oral <User Schedule>    HEMATOLOGIC                        8.4    9.80  )-----------( 196      ( 12 Jun 2022 13:41 )             24.2     PT/INR - ( 12 Jun 2022 13:41 )   PT: 18.7 sec;   INR: 1.62 ratio    PTT - ( 12 Jun 2022 13:41 )  PTT:32.7 sec    INFECTIOUS DISEASES  T(C): 37.2 (06-13-22 @ 03:00), Max: 37.2 (06-13-22 @ 03:00)  WBC Count:  - 9.80 K/uL (06-12 @ 13:41)  - 8.93 K/uL (06-12 @ 10:07)    Recent Cultures:  - Specimen Source: .Body Fluid Pleural Fluid, 06-11 @ 02:42  Results: No growth  Gram Stain: Few polymorphonuclear leukocytes seen. No organisms seen.  Organism: --  - Specimen Source: .Blood Blood, 06-10 @ 18:15  Results: No growth to date.  Gram Stain: --  Organism: --  - Specimen Source: .Blood Blood, 06-10 @ 18:00  Results: No growth to date.  Gram Stain: --  Organism: --    Meds:  - cefepime   IVPB 1000 milliGRAM(s) IV Intermittent every 24 hours  - fluconAZOLE   40 mG/mL Suspension 200 milliGRAM(s) Oral daily  - trimethoprim  40 mG/sulfamethoxazole 200 mG Suspension 80 milliGRAM(s) Enteral Tube daily  - valGANciclovir 50 mG/mL Oral Solution 450 milliGRAM(s) Oral <User Schedule>    ENDOCRINE  Capillary Blood Glucose:  - 137 mg/dL (13 Jun 2022 05:51)  - 167 mg/dL (13 Jun 2022 01:41)  - 231 mg/dL (12 Jun 2022 18:16)  - 118 mg/dL (12 Jun 2022 11:13)  - 207 mg/dL (12 Jun 2022 06:23)    Meds:  - atorvastatin 40 milliGRAM(s) Oral at bedtime  - insulin lispro (ADMELOG) corrective regimen sliding scale   SubCutaneous every 4 hours  - levothyroxine Injectable 40 MICROGram(s) IV Push at bedtime    ACCESS DEVICES:  [x] Peripheral IV  [ ] Central Venous Line		[ ] R	[ ] L	[ ] IJ	[ ] Fem	[ ] SC	Placed:   [x] Arterial Line			[x] R	[ ] L	[ ] Fem	[x] Rad	[ ] Ax	Placed: 6/11  [ ] PICC:					[ ] Mediport  [x] Urinary Catheter, Date Placed: 6/12  [x] Necessity of urinary, arterial, and venous catheters discussed    OTHER MEDICATIONS:  - chlorhexidine 0.12% Liquid 15 milliLiter(s) Oral Mucosa every 12 hours  - magnesium oxide 400 milliGRAM(s) Oral every 8 hours  - multivitamin/minerals/iron Oral Solution (CENTRUM) 15 milliLiter(s) Enteral Tube daily    IMAGING:

## 2022-06-13 NOTE — PROGRESS NOTE ADULT - SUBJECTIVE AND OBJECTIVE BOX
Subjective  "intubated sedated"      Vital Signs Last 24 Hrs  T(C): 36 (22 @ 11:00), Max: 37.2 (22 @ 03:00)  T(F): 96.8 (22 @ 11:00), Max: 99 (22 @ 03:00)  HR: 61 (22 @ 11:00) (47 - 68)  BP: 121/60 (22 @ 17:15) (121/60 - 121/60)  RR: 21 (22 @ 11:00) (14 - 30)  SpO2: 100% (22 @ 11:00) (100% - 100%)          @ 07:01  -   @ 07:00  --------------------------------------------------------  IN: 2485.4 mL / OUT: 1710 mL / NET: 775.4 mL     @ 07:01  -   @ 12:03  --------------------------------------------------------  IN: 301.9 mL / OUT: 165 mL / NET: 136.9 mL     Daily Weight in k.9 (2022 03:00)                          7.7    10.35 )-----------( 200      ( 2022 05:59 )             22.7     0613    143  |  114<H>  |  63<H>  ----------------------------<  134<H>  4.2   |  17<L>  |  2.23<H>    Ca    8.0<L>      2022 05:59  Phos  3.3       Mg     2.1         TPro  5.5<L>  /  Alb  2.3<L>  /  TBili  0.3  /  DBili  0.1  /  AST  15  /  ALT  10  /  AlkPhos  82  06-11      MEDICATIONS  (STANDING):  atorvastatin 40 milliGRAM(s) Oral at bedtime  cefepime   IVPB 1000 milliGRAM(s) IV Intermittent every 24 hours  chlorhexidine 0.12% Liquid 15 milliLiter(s) Oral Mucosa every 12 hours  fluconAZOLE   40 mG/mL Suspension 200 milliGRAM(s) Oral daily  insulin lispro (ADMELOG) corrective regimen sliding scale   SubCutaneous every 4 hours  levETIRAcetam  IVPB 250 milliGRAM(s) IV Intermittent every 12 hours  levothyroxine Injectable 40 MICROGram(s) IV Push at bedtime  magnesium oxide 400 milliGRAM(s) Oral every 8 hours  multivitamin/minerals/iron Oral Solution (CENTRUM) 15 milliLiter(s) Enteral Tube daily  pantoprazole  Injectable 40 milliGRAM(s) IV Push daily  polyethylene glycol 3350 17 Gram(s) Oral daily  predniSONE   Tablet 5 milliGRAM(s) Oral daily  propofol Infusion 10 MICROgram(s)/kG/Min (3.7 mL/Hr) IV Continuous <Continuous>  senna Syrup 10 milliLiter(s) Oral at bedtimes  odium chloride 0.9%. 1000 milliLiter(s) (25 mL/Hr) IV Continuous <Continuous>  tacrolimus    0.5 mG/mL Suspension 1 milliGRAM(s) Oral <User Schedule>  trimethoprim  40 mG/sulfamethoxazole 200 mG Suspension 80 milliGRAM(s) Enteral Tube <User Schedule>  valGANciclovir 50 mG/mL Oral Solution 450 milliGRAM(s) Oral <User Schedule>  valproate sodium  IVPB 750 milliGRAM(s) IV Intermittent every 8 hours    MEDICATIONS  (PRN):  acetaminophen    Suspension .. 975 milliGRAM(s) Enteral Tube every 6 hours PRN Mild Pain (1 - 3)  HYDROmorphone  Injectable 0.5 milliGRAM(s) IV Push every 4 hours PRN Moderate Pain (4 - 6)    Mode: AC/ CMV (Assist Control/ Continuous Mandatory Ventilation)  RR (machine): 16  TV (machine): 450  FiO2: 30  PEEP: 5  ITime: 1  MAP: 10  PIP: 23        < from: CT Chest No Cont (22 @ 17:53) >  LYMPH NODES: Subcentimeter mediastinal lymph nodes are unchanged.    HEART/VASCULATURE: The heart is normal in size. Low-attenuation of blood   pool representing anemia. No pericardial effusion. There are aortic and   coronary artery calcifications.    AIRWAYS/LUNGS/PLEURA: Endotracheal tube with tip in the mid trachea.  There is compressive atelectasis of right lower lobe. The right chest   tube is inserted through the right 5th rib space, the tip is within the   posterior right apical pleural space. Large heterogeneous right pleural   collection, containing low density and high density material representing   hemothorax.  Trace left pleural effusion. Biapical opacities are   unchanged. Redemonstration of a 0.7 cm left lower lobe fissural nodule   (3-79), unchanged from 2020 likely benign intrapulmonary lymph node.   Tiny bubble of air within the right pleural effusion. Otherwise, no   pneumothorax.    UPPER ABDOMEN: Enteric tube with tip in stomach. Bilateral atrophic   kidneys. Residual contrast within the colon.    BONES/SOFT TISSUES: Healed sternotomy. No rib fracture.    IMPRESSION:  Right-sided chest tube with large hemothorax and atelectasis of the right   lower lobe.        CAPILLARY BLOOD GLUCOSE      POCT Blood Glucose.: 189 mg/dL (2022 09:19)  POCT Blood Glucose.: 137 mg/dL (2022 05:51)  POCT Blood Glucose.: 167 mg/dL (2022 01:41)  POCT Blood Glucose.: 231 mg/dL (2022 18:16)            Drains:               R Pleural  [  x]  Drainage:    PHYSICAL EXAM  Neurology: intubate Opens eyes  CV :S1S2RR  Lungs: B/l mechanical breath sounds    Abdomen: soft, nontender, nondistended, positive bowel sounds, last bowel movement     :      tucker         Extremities:  warm  well perfused    B/lle warm well perfused + Dp                                            Discussed with Cardiothoracic Team at AM rounds. Subjective  "intubated sedated"      Vital Signs Last 24 Hrs  T(C): 36 (22 @ 11:00), Max: 37.2 (22 @ 03:00)  T(F): 96.8 (22 @ 11:00), Max: 99 (22 @ 03:00)  HR: 61 (22 @ 11:00) (47 - 68)  BP: 121/60 (22 @ 17:15) (121/60 - 121/60)  RR: 21 (22 @ 11:00) (14 - 30)  SpO2: 100% (22 @ 11:00) (100% - 100%)          @ 07:01  -   @ 07:00  --------------------------------------------------------  IN: 2485.4 mL / OUT: 1710 mL / NET: 775.4 mL     @ 07:01  -   @ 12:03  --------------------------------------------------------  IN: 301.9 mL / OUT: 165 mL / NET: 136.9 mL     Daily Weight in k.9 (2022 03:00)                          7.7    10.35 )-----------( 200      ( 2022 05:59 )             22.7     0613    143  |  114<H>  |  63<H>  ----------------------------<  134<H>  4.2   |  17<L>  |  2.23<H>    Ca    8.0<L>      2022 05:59  Phos  3.3       Mg     2.1         TPro  5.5<L>  /  Alb  2.3<L>  /  TBili  0.3  /  DBili  0.1  /  AST  15  /  ALT  10  /  AlkPhos  82  06-11      MEDICATIONS  (STANDING):  atorvastatin 40 milliGRAM(s) Oral at bedtime  cefepime   IVPB 1000 milliGRAM(s) IV Intermittent every 24 hours  chlorhexidine 0.12% Liquid 15 milliLiter(s) Oral Mucosa every 12 hours  fluconAZOLE   40 mG/mL Suspension 200 milliGRAM(s) Oral daily  insulin lispro (ADMELOG) corrective regimen sliding scale   SubCutaneous every 4 hours  levETIRAcetam  IVPB 250 milliGRAM(s) IV Intermittent every 12 hours  levothyroxine Injectable 40 MICROGram(s) IV Push at bedtime  magnesium oxide 400 milliGRAM(s) Oral every 8 hours  multivitamin/minerals/iron Oral Solution (CENTRUM) 15 milliLiter(s) Enteral Tube daily  pantoprazole  Injectable 40 milliGRAM(s) IV Push daily  polyethylene glycol 3350 17 Gram(s) Oral daily  predniSONE   Tablet 5 milliGRAM(s) Oral daily  propofol Infusion 10 MICROgram(s)/kG/Min (3.7 mL/Hr) IV Continuous <Continuous>  senna Syrup 10 milliLiter(s) Oral at bedtimes  odium chloride 0.9%. 1000 milliLiter(s) (25 mL/Hr) IV Continuous <Continuous>  tacrolimus    0.5 mG/mL Suspension 1 milliGRAM(s) Oral <User Schedule>  trimethoprim  40 mG/sulfamethoxazole 200 mG Suspension 80 milliGRAM(s) Enteral Tube <User Schedule>  valGANciclovir 50 mG/mL Oral Solution 450 milliGRAM(s) Oral <User Schedule>  valproate sodium  IVPB 750 milliGRAM(s) IV Intermittent every 8 hours    MEDICATIONS  (PRN):  acetaminophen    Suspension .. 975 milliGRAM(s) Enteral Tube every 6 hours PRN Mild Pain (1 - 3)  HYDROmorphone  Injectable 0.5 milliGRAM(s) IV Push every 4 hours PRN Moderate Pain (4 - 6)    Mode: AC/ CMV (Assist Control/ Continuous Mandatory Ventilation)  RR (machine): 16  TV (machine): 450  FiO2: 30  PEEP: 5  ITime: 1  MAP: 10  PIP: 23        < from: CT Chest No Cont (22 @ 17:53) >  LYMPH NODES: Subcentimeter mediastinal lymph nodes are unchanged.    HEART/VASCULATURE: The heart is normal in size. Low-attenuation of blood   pool representing anemia. No pericardial effusion. There are aortic and   coronary artery calcifications.    AIRWAYS/LUNGS/PLEURA: Endotracheal tube with tip in the mid trachea.  There is compressive atelectasis of right lower lobe. The right chest   tube is inserted through the right 5th rib space, the tip is within the   posterior right apical pleural space. Large heterogeneous right pleural   collection, containing low density and high density material representing   hemothorax.  Trace left pleural effusion. Biapical opacities are   unchanged. Redemonstration of a 0.7 cm left lower lobe fissural nodule   (3-79), unchanged from 2020 likely benign intrapulmonary lymph node.   Tiny bubble of air within the right pleural effusion. Otherwise, no   pneumothorax.    UPPER ABDOMEN: Enteric tube with tip in stomach. Bilateral atrophic   kidneys. Residual contrast within the colon.    BONES/SOFT TISSUES: Healed sternotomy. No rib fracture.    IMPRESSION:  Right-sided chest tube with large hemothorax and atelectasis of the right   lower lobe.        CAPILLARY BLOOD GLUCOSE      POCT Blood Glucose.: 189 mg/dL (2022 09:19)  POCT Blood Glucose.: 137 mg/dL (2022 05:51)  POCT Blood Glucose.: 167 mg/dL (2022 01:41)  POCT Blood Glucose.: 231 mg/dL (2022 18:16)            Drains:               R Pleural  [  x]  Drainage:130/530    PHYSICAL EXAM  Neurology: intubate Opens eyes  CV :S1S2RR  Lungs: B/l mechanical breath sounds    Abdomen: soft, nontender, nondistended, positive bowel sounds, last bowel movement     :      tucker         Extremities:  warm  well perfused    B/lle warm well perfused + Dp                                            Discussed with Cardiothoracic Team at AM rounds.

## 2022-06-13 NOTE — PROGRESS NOTE ADULT - ATTENDING COMMENTS
65 y/o male w/ a PMHx of CAD s/p CABG, AF on Eliquis c/b CVA c/b seizures, HTN, HLD, DM type II, hypothyroidism, GI bleed due to a duodenal ulcer, and ESRD s/p DDRT c/b DGF requiring HD & large perinephric hematoma s/p evacuation w/ revision of arterial anastomosis who presented in status epilepticus requiring intubation with a hospital course c/b large right pleural effusion s/p thoracentesis c/b hemothorax while being anticoagulated s/p chest tube placement w/ CT demonstrating retained hemothorax    Sedated with Propofol, add PRN Dilaudid  Good gas exchange on 30%, vent adj for resp alkalosis, CXR unchanged R t sided density  Hemodynamically stable   tube feed on hold for possible VATS, bowel regimen  Gentle hydration  Abx Cefepime for possible UTI, all cultures neg  Transplant/antirejection/ ppx abx  ISS  Mechanical DVT ppx    Spoke to sons brother nephew  VATS possible on Wednesday  Will try to extubate

## 2022-06-13 NOTE — PROGRESS NOTE ADULT - ATTENDING COMMENTS
Interval History as per resident note, personally verified by me. Patient remains intubated and sedated. vEEG has been stopped.    GTT's:  Propofol 40 mcg/kg/min    Neurologic exam:  MS - Intubated, sedated, lethargic as he attends to all stimuli b/l, no speech output, does not follow commands. Due to clinical condition unable to assess (PERRY) orientation, rep/naming, attn/conc, recent and remote memory, fund of knowledge  CN - PERRL, EOMI, VFF to threat b/l, (+) face sens/str by corneals b/l and no obvious asymmetry, hearing intact to conversation b/l, SCM at least 4/5 b/l and symmetric. PERRY tongue/palate. (+) spontaneous respirations (pt 22 bpm/vent 16 bpm), (+) cough  Motor - Normal bulk and dec tone all. No spontaneous movement. He does withdraw and localize to strong noxious stimuli in LLE 2/5, RLE 1+/5  Sens - Intact to LT/PP all as he grimaces to noxious stimuli in all extremities  DTR's - 2+ BR b/l, 4+ R AJ with slow clonus, 0+ rest, and neutral b/l plantar response  Coord - PERRY  Gait and station - PERRY    A/P:  Epilepsy, not intractable, with resolved status epilepticus  Encephalopathy  S/p renal transplant with ANA (BUN/Cr inc 65/2.23, GFR dec 32)  Prior stroke  Atrial fibrillation  HTN  DM  Anemia  UTI (klebsiella)    - Status epilepticus likely in the setting of UTI and subtherapeutic AED levels. No further clinical or electrographic seizures seen. He remains intubated and sedated  - vEEG with resolved seizures, stopped  - Continue AED's with Keppra 250mg IV [id number] (adjusted for low GFR) and VPA 750mg IV [id number]. Would check new VPA trough level (30-60 minutes before dose) and LFT's today  - Continue to address above medical issues, as you are doing  - Will continue to follow patient with you

## 2022-06-13 NOTE — PROGRESS NOTE ADULT - PROBLEM SELECTOR PLAN 1
Hemothorax post  thoracentesis- intubated   Chest tube LWS   Daily XRay  6/11 Chest tube placed  6/12 CT scan completed  6/13 plan for OR with  Dr Granger  date TBD  check INR  COVID  HCT/ HGB    Care as per SICU team Hemothorax post  thoracentesis- intubated   Chest tube LWS   Daily XRay  6/11 Chest tube placed  6/12 CT scan completed  6/13 plan for OR with  Dr Granger  Wednesday 6/15 at 1330  check INR  COVID  HCT/ HGB Type and screen     Care as per SICU team

## 2022-06-13 NOTE — PROGRESS NOTE ADULT - SUBJECTIVE AND OBJECTIVE BOX
[facility] DIVISION OF KIDNEY DISEASES AND HYPERTENSION -- FOLLOW UP NOTE  --------------------------------------------------------------------------------  HPI:  66 yr old man with ESRD due to DM was on HD since 2019, s/p DCD DDRT on 4/21/22. Donor was 58 , KDPI 82%, DCD, single vessels and ureter, HLA mismatch 1, 2, 2. No DSA, cPRA 0%. 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 Readmitted in May for anemia in the setting of large perinephric hematoma s/p evacuation and repair of arterial anastomosis on 5/2/22. Intra operative biopsy showed no rejection, Creatinine ranging 2mg/dL. He was recently dx with klebsiella UTI rx with ertapenem - then switched to Levaquin day #6. He also had parainfluenza pneumonia. Was at rehab progressing well. Presented with status epilepticus. Intubated for respiratory protection. Right pleural effusion drained 1300ml. Transfused 1 unit PRBC. Pt. transferred to SICU and was transfused 4 additional units.     Pt. seen this AM in SICU, remains intubated and sedated. SCr. stabilizing. UOP adequate.         PAST HISTORY  --------------------------------------------------------------------------------  No significant changes to PMH, PSH, FHx, SHx, unless otherwise noted    ALLERGIES & MEDICATIONS  --------------------------------------------------------------------------------  Allergies    No Known Allergies    Intolerances      Standing Inpatient Medications  atorvastatin 40 milliGRAM(s) Oral at bedtime  cefepime   IVPB 1000 milliGRAM(s) IV Intermittent every 24 hours  chlorhexidine 0.12% Liquid 15 milliLiter(s) Oral Mucosa every 12 hours  fluconAZOLE   40 mG/mL Suspension 200 milliGRAM(s) Oral daily  insulin lispro (ADMELOG) corrective regimen sliding scale   SubCutaneous every 4 hours  levETIRAcetam  IVPB 250 milliGRAM(s) IV Intermittent every 12 hours  levothyroxine Injectable 40 MICROGram(s) IV Push at bedtime  magnesium oxide 400 milliGRAM(s) Oral every 8 hours  multivitamin/minerals/iron Oral Solution (CENTRUM) 15 milliLiter(s) Enteral Tube daily  pantoprazole  Injectable 40 milliGRAM(s) IV Push daily  polyethylene glycol 3350 17 Gram(s) Oral daily  predniSONE   Tablet 5 milliGRAM(s) Oral daily  propofol Infusion 10 MICROgram(s)/kG/Min IV Continuous <Continuous>  senna Syrup 10 milliLiter(s) Oral at bedtime  sodium chloride 0.9%. 1000 milliLiter(s) IV Continuous <Continuous>  tacrolimus    0.5 mG/mL Suspension 1 milliGRAM(s) Oral <User Schedule>  trimethoprim  40 mG/sulfamethoxazole 200 mG Suspension 80 milliGRAM(s) Enteral Tube daily  valGANciclovir 50 mG/mL Oral Solution 450 milliGRAM(s) Oral <User Schedule>  valproate sodium  IVPB 750 milliGRAM(s) IV Intermittent every 8 hours    PRN Inpatient Medications  acetaminophen    Suspension .. 975 milliGRAM(s) Enteral Tube every 6 hours PRN  HYDROmorphone  Injectable 0.5 milliGRAM(s) IV Push every 4 hours PRN      REVIEW OF SYSTEMS  --------------------------------------------------------------------------------  Unable to obtain    VITALS/PHYSICAL EXAM  --------------------------------------------------------------------------------  T(C): 37 (06-13-22 @ 07:00), Max: 37.2 (06-13-22 @ 03:00)  HR: 59 (06-13-22 @ 09:00) (47 - 68)  BP: 121/60 (06-12-22 @ 17:15) (121/60 - 121/60)  RR: 20 (06-13-22 @ 09:00) (14 - 30)  SpO2: 100% (06-13-22 @ 09:00) (100% - 100%)  Wt(kg): --        06-12-22 @ 07:01  -  06-13-22 @ 07:00  --------------------------------------------------------  IN: 2485.4 mL / OUT: 1710 mL / NET: 775.4 mL    06-13-22 @ 07:01  -  06-13-22 @ 09:57  --------------------------------------------------------  IN: 174.1 mL / OUT: 80 mL / NET: 94.1 mL      Physical Exam:  	Gen: Intubated and sedated  	HEENT: PERRL, MMM   	Pulm: ETT+  	CV: S1S2+  	Abd: +BS, soft, non-tender          Transplant: No tenderness, swelling  	: No suprapubic tenderness  	MSK: no edema   	Psych: Intubated and sedated  	Skin: Warm          Access: CVC catheter    LABS/STUDIES  --------------------------------------------------------------------------------              7.7    10.35 >-----------<  200      [06-13-22 @ 05:59]              22.7     143  |  114  |  63  ----------------------------<  134      [06-13-22 @ 05:59]  4.2   |  17  |  2.23        Ca     8.0     [06-13-22 @ 05:59]      Mg     2.1     [06-13-22 @ 05:59]      Phos  3.3     [06-13-22 @ 05:59]    TPro  5.5  /  Alb  2.3  /  TBili  0.3  /  DBili  0.1  /  AST  15  /  ALT  10  /  AlkPhos  82  [06-11-22 @ 21:08]    PT/INR: PT 18.5 , INR 1.60       [06-13-22 @ 05:59]  PTT: 32.7       [06-13-22 @ 05:59]          [06-11-22 @ 18:47]    Creatinine Trend:  SCr 2.23 [06-13 @ 05:59]  SCr 2.31 [06-13 @ 00:41]  SCr 2.37 [06-12 @ 13:41]  SCr 2.09 [06-12 @ 00:50]  SCr 2.22 [06-11 @ 21:08]    Tacrolimus (), Serum: 3.6 ng/mL (06-11 @ 06:44)      Urinalysis - [06-10-22 @ 22:28]      Color Light Yellow / Appearance Clear / SG 1.016 / pH 6.0      Gluc 100 mg/dL / Ketone Negative  / Bili Negative / Urobili Negative       Blood Small / Protein Trace / Leuk Est Negative / Nitrite Negative      RBC 25 / WBC 5 / Hyaline  / Gran  / Sq Epi  / Non Sq Epi 1 / Bacteria Negative    Urine Creatinine 38      [06-11-22 @ 09:32]  Urine Protein 18      [06-11-22 @ 09:32]  Urine Sodium 50      [06-11-22 @ 09:32]  Urine Potassium 10      [06-11-22 @ 09:32]  Urine Chloride <20      [06-11-22 @ 09:32]    Iron 17, TIBC 171, %sat 10      [05-02-22 @ 13:03]  Ferritin 1505      [05-02-22 @ 13:05]  PTH -- (Ca 9.2)      [02-05-22 @ 10:13]   294  HbA1c 6.0      [02-21-20 @ 08:53]  Lipid: chol 118, TG 54, HDL 59, LDL --      [06-27-21 @ 09:44]

## 2022-06-13 NOTE — PROGRESS NOTE ADULT - ASSESSMENT
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 Creatinine trending down to 2-2.2mg/dL. H/o seizure d/o with last seizure episode on 4/8/22. Admitted for status epilepticus in the setting of sub therapeutic valproic acid levels. Had right thoracentesis done on 6/10 for effusion complicated by large hemothorax, hemorrhagic shock requiring PRBC x 5 units and vasopressors, chest tube placed. BP now stable on low dose phenylephrine.     PMH: DM type II, HTN, CAD s/p CABG in 2020, Afib on Eliquis, CVA in 2019 due to Afib, Seizure d/o following CVA last episode was on 4/8/22, h/o GI bleed in 2/2022 EGD with duodenal ulcer.    Donor was 58 , KDPI 82%, DCD, single vessels and ureter, HLA mismatch 1, 2, 2. No DSA, cPRA 0%. 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   Readmitted in May for anemia in the setting of large perinephric hematoma s/p evacuation and repair of arterial anastomosis on 5/2/22. Intra operative biopsy showed no rejection,   Creatinine ranging 2mg/dL. Recent dx of klebsiella UTI on Levaquin       1. S/p DCD DDRT on 4/21/22  - creatinine stable at 2mg/dL but oliguric, expect cr to rise tomorrow in the setting of hemodynamic instability.        2. Immunosuppression - continue prograf suspension 2mg bid. Obtain tacrolimus level 30min prior to AM dose.       Continue prednisone 5mg daily. Hold CellCept for now       Consider changing tacro to belatacept since tacrolimus can lower seizure threshold      Infection prophylaxis - continue bactrim, Valcyte , fluconazole 200mg daily (on fluconazole after repair of arterial anastomosis)    3. Right hemothorax following thoracentesis with severe anemia - s/p PRBC and FFP, chest tube draining 1 liter. Continue to monitor H/H and chest tube output.     4. Seizure d/o -admitted with status epilepticus in the setting of low VPA levels. On Keppra and Valproic acid, VPA level therapeutic, no further seizures on EEG.     5. Klebsiella UT_ - continue cefepime, f/u urine culture

## 2022-06-13 NOTE — PROGRESS NOTE ADULT - SUBJECTIVE AND OBJECTIVE BOX
INCOMPLETE  Neurology  Progress Note  06-13-22    Name:  CHAD DUNBAR; 66y    Interval History:    Medications:  acetaminophen    Suspension .. 975 milliGRAM(s) Enteral Tube every 6 hours PRN  atorvastatin 40 milliGRAM(s) Oral at bedtime  cefepime   IVPB 1000 milliGRAM(s) IV Intermittent every 24 hours  chlorhexidine 0.12% Liquid 15 milliLiter(s) Oral Mucosa every 12 hours  fluconAZOLE   40 mG/mL Suspension 200 milliGRAM(s) Oral daily  HYDROmorphone  Injectable 0.5 milliGRAM(s) IV Push every 4 hours PRN  insulin lispro (ADMELOG) corrective regimen sliding scale   SubCutaneous every 4 hours  levETIRAcetam  IVPB 250 milliGRAM(s) IV Intermittent every 12 hours  levothyroxine Injectable 40 MICROGram(s) IV Push at bedtime  magnesium oxide 400 milliGRAM(s) Oral every 8 hours  multivitamin/minerals/iron Oral Solution (CENTRUM) 15 milliLiter(s) Enteral Tube daily  pantoprazole  Injectable 40 milliGRAM(s) IV Push daily  piperacillin/tazobactam IVPB... 3.375 Gram(s) IV Intermittent once  polyethylene glycol 3350 17 Gram(s) Oral daily  predniSONE   Tablet 5 milliGRAM(s) Oral daily  propofol Infusion 10 MICROgram(s)/kG/Min IV Continuous <Continuous>  protamine  IVPB 30 milliGRAM(s) IV Intermittent once  senna Syrup 10 milliLiter(s) Oral at bedtime  sodium chloride 0.9%. 1000 milliLiter(s) IV Continuous <Continuous>  tacrolimus    0.5 mG/mL Suspension 0.5 milliGRAM(s) Oral <User Schedule>  trimethoprim  40 mG/sulfamethoxazole 200 mG Suspension 80 milliGRAM(s) Enteral Tube <User Schedule>  valGANciclovir 50 mG/mL Oral Solution 450 milliGRAM(s) Oral <User Schedule>  valproate sodium  IVPB 750 milliGRAM(s) IV Intermittent every 8 hours      Vitals:  T(C): 36 (06-13-22 @ 15:00), Max: 37.2 (06-13-22 @ 03:00)  HR: 58 (06-13-22 @ 16:06) (53 - 68)  BP: 121/60 (06-12-22 @ 17:15) (121/60 - 121/60)  RR: 17 (06-13-22 @ 16:00) (14 - 30)  SpO2: 100% (06-13-22 @ 16:06) (100% - 100%)    Physical Examination: INCOMPLETE  Neurologic:  - Mental Status: Alert, awake, oriented to person, place, and time; Speech is fluent with intact naming, repetition, and comprehension; Good overall fund of knowledge; Immediate recall is 3/3 words and delayed recall is 3/3 words at 5 minutes; Able to spell WORLD backwards and perform serial 7 subtraction; Able to read and write a sentence.  - Cranial Nerves:  II: Visual fields are full to confrontation; Pupils are equal, round, and reactive to light; Visual acuity is 20/20 bilaterally; Fundoscopic exam is normal with sharp discs.  III, IV, VI: Extraocular movements are intact without nystagmus.  V: Facial sensation is intact in the V1-V3 distribution bilaterally.  VII: Face is symmetric with normal eye closure and smile.  VIII: Hearing is intact to finger rub.  IX, X: Uvula is midline and soft palate rises symmetrically.  XI: Head turning and shoulder shrug are intact.  XII: Tongue protrudes in the midline.  - Motor: Strength is 5/5 throughout. There is no pronator drift. Normal muscle bulk and tone throughout.  - Reflexes: 2+ and symmetric at the biceps, triceps, brachioradialis, knees, and ankles. Plant responses down bilaterally.  - Sensory: Intact throughout to light touch, pin prick, vibration, and joint-position.  - Coordination: Finger-nose-finger and heel-knee-shin intact without dysmetria. Rapid alternating hand movements intact.  - Gait: Normal steps, base, arm swing, and turning. Heel and toe walking are normal. Tandem gait is normal. Romberg testing is negative.    Labs:                        8.7    10.79 )-----------( 170      ( 13 Jun 2022 16:32 )             25.8     06-13    143  |  114<H>  |  63<H>  ----------------------------<  134<H>  4.2   |  17<L>  |  2.23<H>    Ca    8.0<L>      13 Jun 2022 05:59  Phos  3.3     06-13  Mg     2.1     06-13    TPro  5.5<L>  /  Alb  2.3<L>  /  TBili  0.3  /  DBili  0.1  /  AST  15  /  ALT  10  /  AlkPhos  82  06-11    CAPILLARY BLOOD GLUCOSE  POCT Blood Glucose.: 202 mg/dL (13 Jun 2022 13:02)    LIVER FUNCTIONS - ( 11 Jun 2022 21:08 )  Alb: 2.3 g/dL / Pro: 5.5 g/dL / ALK PHOS: 82 U/L / ALT: 10 U/L / AST: 15 U/L / GGT: x             Culture - Fungal, Body Fluid (collected 11 Jun 2022 02:42)  Source: .Body Fluid Pleural Fluid  Preliminary Report (13 Jun 2022 10:21):    Testing in progress    Culture - Body Fluid with Gram Stain (collected 11 Jun 2022 02:42)  Source: .Body Fluid Pleural Fluid  Gram Stain (11 Jun 2022 12:29):    Few polymorphonuclear leukocytes seen    No organisms seen  Preliminary Report (12 Jun 2022 07:20):    No growth    Culture - Blood (collected 10 Raffi 2022 18:15)  Source: .Blood Blood  Preliminary Report (12 Jun 2022 01:02):    No growth to date.    Culture - Blood (collected 10 Raffi 2022 18:00)  Source: .Blood Blood  Preliminary Report (12 Jun 2022 01:02):    No growth to date.      PT/INR - ( 13 Jun 2022 05:59 )   PT: 18.5 sec;   INR: 1.60 ratio    PTT - ( 13 Jun 2022 05:59 )  PTT:32.7 sec            Radiology:  CT Head No Cont:  (10 Raffi 2022 17:28)  IMPRESSION:  Noacute intracranial hemorrhage. Chronic findings as in full report. INCOMPLETE  Neurology  Progress Note  06-13-22    Name:  CHAD DUNBAR; 66y    Interval History:   s/p intubation on 6/11, patient on precedex     Medications:  acetaminophen    Suspension .. 975 milliGRAM(s) Enteral Tube every 6 hours PRN  atorvastatin 40 milliGRAM(s) Oral at bedtime  cefepime   IVPB 1000 milliGRAM(s) IV Intermittent every 24 hours  chlorhexidine 0.12% Liquid 15 milliLiter(s) Oral Mucosa every 12 hours  fluconAZOLE   40 mG/mL Suspension 200 milliGRAM(s) Oral daily  HYDROmorphone  Injectable 0.5 milliGRAM(s) IV Push every 4 hours PRN  insulin lispro (ADMELOG) corrective regimen sliding scale   SubCutaneous every 4 hours  levETIRAcetam  IVPB 250 milliGRAM(s) IV Intermittent every 12 hours  levothyroxine Injectable 40 MICROGram(s) IV Push at bedtime  magnesium oxide 400 milliGRAM(s) Oral every 8 hours  multivitamin/minerals/iron Oral Solution (CENTRUM) 15 milliLiter(s) Enteral Tube daily  pantoprazole  Injectable 40 milliGRAM(s) IV Push daily  piperacillin/tazobactam IVPB... 3.375 Gram(s) IV Intermittent once  polyethylene glycol 3350 17 Gram(s) Oral daily  predniSONE   Tablet 5 milliGRAM(s) Oral daily  propofol Infusion 10 MICROgram(s)/kG/Min IV Continuous <Continuous>  protamine  IVPB 30 milliGRAM(s) IV Intermittent once  senna Syrup 10 milliLiter(s) Oral at bedtime  sodium chloride 0.9%. 1000 milliLiter(s) IV Continuous <Continuous>  tacrolimus    0.5 mG/mL Suspension 0.5 milliGRAM(s) Oral <User Schedule>  trimethoprim  40 mG/sulfamethoxazole 200 mG Suspension 80 milliGRAM(s) Enteral Tube <User Schedule>  valGANciclovir 50 mG/mL Oral Solution 450 milliGRAM(s) Oral <User Schedule>  valproate sodium  IVPB 750 milliGRAM(s) IV Intermittent every 8 hours      Vitals:  T(C): 36 (06-13-22 @ 15:00), Max: 37.2 (06-13-22 @ 03:00)  HR: 58 (06-13-22 @ 16:06) (53 - 68)  BP: 121/60 (06-12-22 @ 17:15) (121/60 - 121/60)  RR: 17 (06-13-22 @ 16:00) (14 - 30)  SpO2: 100% (06-13-22 @ 16:06) (100% - 100%)    Physical Examination:   Neurologic:  - Mental Status: Obtundent, does not follow commands.  - Cranial Nerves: Opens eyes to verbal stimuli, innattentive primary gaze, Blinks to threat B/L, Surgical pupils equal, round, and reactive to light, Face appears grossly symmetric, Uvula is midline, Gag reflex intact  - Motor: No purposeful movements. Withdraws to painful stimuli LLE > RLE.  - Reflexes: 0 and symmetric at the biceps, triceps, brachioradialis, knees, and ankles. Slowed clonus of right ankle. No clonus at left ankle. Plantar responses neutral.  - Sensory: Grimaces to noxious stimuli in B/L upper extremities.  - Coordination: Deferred  - Gait: Deferred    Labs:                        8.7    10.79 )-----------( 170      ( 13 Jun 2022 16:32 )             25.8     06-13    143  |  114<H>  |  63<H>  ----------------------------<  134<H>  4.2   |  17<L>  |  2.23<H>    Ca    8.0<L>      13 Jun 2022 05:59  Phos  3.3     06-13  Mg     2.1     06-13    TPro  5.5<L>  /  Alb  2.3<L>  /  TBili  0.3  /  DBili  0.1  /  AST  15  /  ALT  10  /  AlkPhos  82  06-11    CAPILLARY BLOOD GLUCOSE  POCT Blood Glucose.: 202 mg/dL (13 Jun 2022 13:02)    LIVER FUNCTIONS - ( 11 Jun 2022 21:08 )  Alb: 2.3 g/dL / Pro: 5.5 g/dL / ALK PHOS: 82 U/L / ALT: 10 U/L / AST: 15 U/L / GGT: x             Culture - Fungal, Body Fluid (collected 11 Jun 2022 02:42)  Source: .Body Fluid Pleural Fluid  Preliminary Report (13 Jun 2022 10:21):    Testing in progress    Culture - Body Fluid with Gram Stain (collected 11 Jun 2022 02:42)  Source: .Body Fluid Pleural Fluid  Gram Stain (11 Jun 2022 12:29):    Few polymorphonuclear leukocytes seen    No organisms seen  Preliminary Report (12 Jun 2022 07:20):    No growth    Culture - Blood (collected 10 Raffi 2022 18:15)  Source: .Blood Blood  Preliminary Report (12 Jun 2022 01:02):    No growth to date.    Culture - Blood (collected 10 Raffi 2022 18:00)  Source: .Blood Blood  Preliminary Report (12 Jun 2022 01:02):    No growth to date.      PT/INR - ( 13 Jun 2022 05:59 )   PT: 18.5 sec;   INR: 1.60 ratio    PTT - ( 13 Jun 2022 05:59 )  PTT:32.7 sec            Radiology:  CT Head No Cont:  (10 Raffi 2022 17:28)  IMPRESSION:  Noacute intracranial hemorrhage. Chronic findings as in full report.   Neurology  Progress Note  06-13-22    Name:  CHAD DUNBAR; 66y    Interval History:   s/p intubation on 6/11, patient on propofol 40 mcg    Medications:  acetaminophen    Suspension .. 975 milliGRAM(s) Enteral Tube every 6 hours PRN  atorvastatin 40 milliGRAM(s) Oral at bedtime  cefepime   IVPB 1000 milliGRAM(s) IV Intermittent every 24 hours  chlorhexidine 0.12% Liquid 15 milliLiter(s) Oral Mucosa every 12 hours  fluconAZOLE   40 mG/mL Suspension 200 milliGRAM(s) Oral daily  HYDROmorphone  Injectable 0.5 milliGRAM(s) IV Push every 4 hours PRN  insulin lispro (ADMELOG) corrective regimen sliding scale   SubCutaneous every 4 hours  levETIRAcetam  IVPB 250 milliGRAM(s) IV Intermittent every 12 hours  levothyroxine Injectable 40 MICROGram(s) IV Push at bedtime  magnesium oxide 400 milliGRAM(s) Oral every 8 hours  multivitamin/minerals/iron Oral Solution (CENTRUM) 15 milliLiter(s) Enteral Tube daily  pantoprazole  Injectable 40 milliGRAM(s) IV Push daily  piperacillin/tazobactam IVPB... 3.375 Gram(s) IV Intermittent once  polyethylene glycol 3350 17 Gram(s) Oral daily  predniSONE   Tablet 5 milliGRAM(s) Oral daily  propofol Infusion 10 MICROgram(s)/kG/Min IV Continuous <Continuous>  protamine  IVPB 30 milliGRAM(s) IV Intermittent once  senna Syrup 10 milliLiter(s) Oral at bedtime  sodium chloride 0.9%. 1000 milliLiter(s) IV Continuous <Continuous>  tacrolimus    0.5 mG/mL Suspension 0.5 milliGRAM(s) Oral <User Schedule>  trimethoprim  40 mG/sulfamethoxazole 200 mG Suspension 80 milliGRAM(s) Enteral Tube <User Schedule>  valGANciclovir 50 mG/mL Oral Solution 450 milliGRAM(s) Oral <User Schedule>  valproate sodium  IVPB 750 milliGRAM(s) IV Intermittent every 8 hours      Vitals:  T(C): 36 (06-13-22 @ 15:00), Max: 37.2 (06-13-22 @ 03:00)  HR: 58 (06-13-22 @ 16:06) (53 - 68)  BP: 121/60 (06-12-22 @ 17:15) (121/60 - 121/60)  RR: 17 (06-13-22 @ 16:00) (14 - 30)  SpO2: 100% (06-13-22 @ 16:06) (100% - 100%)    Physical Examination:   Neurologic:  - Mental Status: Obtundent, opens eyes to verbal stimuli, does not follow commands.  - Cranial Nerves: inattentive primary gaze, Blinks to threat B/L, Surgical pupils equal, round, and reactive to light, Face appears grossly symmetric although difficult to adequately assess due to intubation, Gag reflex intact  - Motor: No purposeful movements. Withdraws to painful stimuli LLE > RLE. no resting tremor or myoclonus noted.   - Reflexes: 0 and symmetric at the biceps, triceps, brachioradialis, knees, and ankles. Slowed clonus of right ankle. 2 beats of clonus at left ankle that extinguish. Plantar responses neutral.  - Sensory: Grimaces to noxious stimuli in B/L upper extremities.  - Coordination: patient unable to complete task   - Gait: patient unable to complete task    Labs:                        8.7    10.79 )-----------( 170      ( 13 Jun 2022 16:32 )             25.8     06-13    143  |  114<H>  |  63<H>  ----------------------------<  134<H>  4.2   |  17<L>  |  2.23<H>    Ca    8.0<L>      13 Jun 2022 05:59  Phos  3.3     06-13  Mg     2.1     06-13    TPro  5.5<L>  /  Alb  2.3<L>  /  TBili  0.3  /  DBili  0.1  /  AST  15  /  ALT  10  /  AlkPhos  82  06-11    CAPILLARY BLOOD GLUCOSE  POCT Blood Glucose.: 202 mg/dL (13 Jun 2022 13:02)    LIVER FUNCTIONS - ( 11 Jun 2022 21:08 )  Alb: 2.3 g/dL / Pro: 5.5 g/dL / ALK PHOS: 82 U/L / ALT: 10 U/L / AST: 15 U/L / GGT: x             Culture - Fungal, Body Fluid (collected 11 Jun 2022 02:42)  Source: .Body Fluid Pleural Fluid  Preliminary Report (13 Jun 2022 10:21):    Testing in progress    Culture - Body Fluid with Gram Stain (collected 11 Jun 2022 02:42)  Source: .Body Fluid Pleural Fluid  Gram Stain (11 Jun 2022 12:29):    Few polymorphonuclear leukocytes seen    No organisms seen  Preliminary Report (12 Jun 2022 07:20):    No growth    Culture - Blood (collected 10 Raffi 2022 18:15)  Source: .Blood Blood  Preliminary Report (12 Jun 2022 01:02):    No growth to date.    Culture - Blood (collected 10 Raffi 2022 18:00)  Source: .Blood Blood  Preliminary Report (12 Jun 2022 01:02):    No growth to date.      PT/INR - ( 13 Jun 2022 05:59 )   PT: 18.5 sec;   INR: 1.60 ratio    PTT - ( 13 Jun 2022 05:59 )  PTT:32.7 sec            Radiology:  CT Head No Cont:  (10 Raffi 2022 17:28)  IMPRESSION:  Noacute intracranial hemorrhage. Chronic findings as in full report.   Neurology  Progress Note  06-13-22    Name:  CHAD DUNBAR; 66y    Interval History:   s/p intubation on 6/11, patient on propofol 40 mcg    FHx: Non-contributory    ROS: Due to clinical condition unable to assess (PERRY)    Medications:  acetaminophen    Suspension .. 975 milliGRAM(s) Enteral Tube every 6 hours PRN  atorvastatin 40 milliGRAM(s) Oral at bedtime  cefepime   IVPB 1000 milliGRAM(s) IV Intermittent every 24 hours  chlorhexidine 0.12% Liquid 15 milliLiter(s) Oral Mucosa every 12 hours  fluconAZOLE   40 mG/mL Suspension 200 milliGRAM(s) Oral daily  HYDROmorphone  Injectable 0.5 milliGRAM(s) IV Push every 4 hours PRN  insulin lispro (ADMELOG) corrective regimen sliding scale   SubCutaneous every 4 hours  levETIRAcetam  IVPB 250 milliGRAM(s) IV Intermittent every 12 hours  levothyroxine Injectable 40 MICROGram(s) IV Push at bedtime  magnesium oxide 400 milliGRAM(s) Oral every 8 hours  multivitamin/minerals/iron Oral Solution (CENTRUM) 15 milliLiter(s) Enteral Tube daily  pantoprazole  Injectable 40 milliGRAM(s) IV Push daily  piperacillin/tazobactam IVPB... 3.375 Gram(s) IV Intermittent once  polyethylene glycol 3350 17 Gram(s) Oral daily  predniSONE   Tablet 5 milliGRAM(s) Oral daily  propofol Infusion 10 MICROgram(s)/kG/Min IV Continuous <Continuous>  protamine  IVPB 30 milliGRAM(s) IV Intermittent once  senna Syrup 10 milliLiter(s) Oral at bedtime  sodium chloride 0.9%. 1000 milliLiter(s) IV Continuous <Continuous>  tacrolimus    0.5 mG/mL Suspension 0.5 milliGRAM(s) Oral <User Schedule>  trimethoprim  40 mG/sulfamethoxazole 200 mG Suspension 80 milliGRAM(s) Enteral Tube <User Schedule>  valGANciclovir 50 mG/mL Oral Solution 450 milliGRAM(s) Oral <User Schedule>  valproate sodium  IVPB 750 milliGRAM(s) IV Intermittent every 8 hours      Vitals:  T(C): 36 (06-13-22 @ 15:00), Max: 37.2 (06-13-22 @ 03:00)  HR: 58 (06-13-22 @ 16:06) (53 - 68)  BP: 121/60 (06-12-22 @ 17:15) (121/60 - 121/60)  RR: 17 (06-13-22 @ 16:00) (14 - 30)  SpO2: 100% (06-13-22 @ 16:06) (100% - 100%)    Physical Examination:   Neurologic:  - Mental Status: Obtundent, opens eyes to verbal stimuli, does not follow commands.  - Cranial Nerves: inattentive primary gaze, Blinks to threat B/L, Surgical pupils equal, round, and reactive to light, Face appears grossly symmetric although difficult to adequately assess due to intubation, Gag reflex intact  - Motor: No purposeful movements. Withdraws to painful stimuli LLE > RLE. no resting tremor or myoclonus noted.   - Reflexes: 0 and symmetric at the biceps, triceps, brachioradialis, knees, and ankles. Slowed clonus of right ankle. 2 beats of clonus at left ankle that extinguish. Plantar responses neutral.  - Sensory: Grimaces to noxious stimuli in B/L upper extremities.  - Coordination: patient unable to complete task   - Gait: patient unable to complete task    Labs:                        8.7    10.79 )-----------( 170      ( 13 Jun 2022 16:32 )             25.8     06-13    143  |  114<H>  |  63<H>  ----------------------------<  134<H>  4.2   |  17<L>  |  2.23<H>    Ca    8.0<L>      13 Jun 2022 05:59  Phos  3.3     06-13  Mg     2.1     06-13    TPro  5.5<L>  /  Alb  2.3<L>  /  TBili  0.3  /  DBili  0.1  /  AST  15  /  ALT  10  /  AlkPhos  82  06-11    CAPILLARY BLOOD GLUCOSE  POCT Blood Glucose.: 202 mg/dL (13 Jun 2022 13:02)    LIVER FUNCTIONS - ( 11 Jun 2022 21:08 )  Alb: 2.3 g/dL / Pro: 5.5 g/dL / ALK PHOS: 82 U/L / ALT: 10 U/L / AST: 15 U/L / GGT: x             Culture - Fungal, Body Fluid (collected 11 Jun 2022 02:42)  Source: .Body Fluid Pleural Fluid  Preliminary Report (13 Jun 2022 10:21):    Testing in progress    Culture - Body Fluid with Gram Stain (collected 11 Jun 2022 02:42)  Source: .Body Fluid Pleural Fluid  Gram Stain (11 Jun 2022 12:29):    Few polymorphonuclear leukocytes seen    No organisms seen  Preliminary Report (12 Jun 2022 07:20):    No growth    Culture - Blood (collected 10 Raffi 2022 18:15)  Source: .Blood Blood  Preliminary Report (12 Jun 2022 01:02):    No growth to date.    Culture - Blood (collected 10 Raffi 2022 18:00)  Source: .Blood Blood  Preliminary Report (12 Jun 2022 01:02):    No growth to date.      PT/INR - ( 13 Jun 2022 05:59 )   PT: 18.5 sec;   INR: 1.60 ratio    PTT - ( 13 Jun 2022 05:59 )  PTT:32.7 sec            Radiology:  CT Head No Cont:  (10 Raffi 2022 17:28)  IMPRESSION:  Noacute intracranial hemorrhage. Chronic findings as in full report.

## 2022-06-13 NOTE — PROGRESS NOTE ADULT - ATTENDING COMMENTS
Pt still extubated, has persistent effusion despite chest tube  Saturating well  Kidney function stable, likely has residual ATN  Continue current immunosuppression. Pt still intubated, has persistent effusion despite chest tube  Saturating well  Kidney function stable, likely has residual ATN  Continue current immunosuppression.

## 2022-06-14 NOTE — PROGRESS NOTE ADULT - ATTENDING COMMENTS
S: Denies cp, sob, LH/dizziness or right groin pain. Continues to c/o weakness. Tele: Sinus    O:  Physical Exam:  /62   Pulse 67   Temp 98.2 °F (36.8 °C) (Oral)   Resp 16   Ht 5' 11\" (1.803 m)   Wt (!) 315 lb 0.6 oz (142.9 kg)   SpO2 98%   BMI 43.94 kg/m²    General (appearance):  No acute distress  Eyes: anicteric   Neck: soft, No JVD  Ears/Nose/Mouth/Thorat: No cyanosis  CV: RRR   Respiratory:  Clear  GI: soft, non-tender, non-distended  Skin: Warm, dry. No rashes. Abrasion to forehead and both knees  Neuro/Psych: Alert and oriented x 3. Appropriate behavior  Ext:  No c/c.  No significatn edema  Pulses:  2+ right femoral. Right groin soft, no hematoma, + distal pulses     I.O's= -3.8 L     Weight  Admission: Weight: 285 lb (129.3 kg)   Today: Weight: (!) 315 lb 0.6 oz (142.9 kg)    CBC:   Recent Labs     06/05/19  1445 06/06/19  0545 06/07/19  0530   WBC 7.7  7.6 8.1 9.3   HGB 11.1*  11.1* 12.0* 11.9*   HCT 34.1*  33.8* 36.3* 36.1*   MCV 88.1  87.1 86.4 87.2     276 303 318     BMP:   Recent Labs     06/05/19  0219 06/06/19  0545 06/07/19  0530    139 140   K 3.9 3.9 4.3    102 101   CO2 27 25 27   BUN 12 11 10   CREATININE 0.7* 0.6* 0.6*     Mag: No results found for: MG     PT/INR:   Recent Labs     06/05/19 0219   PROTIME 12.2   INR 1.07     APTT:   Recent Labs     06/05/19 0219   APTT 28.7     Pro-BNP:   Lab Results   Component Value Date    PROBNP 298 06/04/2019    PROBNP 136 09/12/2018     CARDIAC ENZYMES:   Recent Labs     06/05/19  0219 06/05/19  1444 06/06/19  0545   CKTOTAL 1,080*  --   --    TROPONINI 0.68* 0.55* 0.67*     TSH:   Lab Results   Component Value Date    TSH 1.31 06/04/2019     LIPIDS:   Lab Results   Component Value Date    CHOL 150 06/05/2019     Lab Results   Component Value Date    TRIG 207 (H) 06/05/2019     Lab Results   Component Value Date    HDL 31 (L) 06/05/2019    HDL 30 (L) 06/05/2019     Lab Results   Component Value Date    LDLCALC 78 2019    [address] 88 2019     Lab Results   Component Value Date    LABVLDL 41 2019    LABVLDL 33 2019     No results found for: [id number]    Imagin2019 Coronary angiogram  Angiographic Findings:  Left dominant system  Left Main:  Normal  Left Anterior Descending:  Diffuse irregularities. Mid 20% stenosis. No lesion > 30%. Circumflex:  Very large caliber vessel. No significant disease. Right Coronary:  Non-dominant. No obstructive disease. Left Ventriculogram:  LVEF 55-60%. Femoral Angiogram:  No significant plaque. Hemodynamics (mm Hg):  Left Ventricular Pressure:  156/6, 27  Central Aortic Pressure:  139/67 (91)    2019 Echo   Technically difficult study. Definity contrast administered. Left   ventricular cavity size appears mildly enlarged. There is mild concentric   left ventricular hypertrophy. Overall left ventricular systolic function   appears normal with an estimated ejection fraction of 55%. No regional wall   motion abnormalities are noted. Diastolic filling parameters suggests normal   diastolic function. Yesica Flaquito mitral regurgitation is present. Estimated   pulmonary artery systolic pressure is at 15 mmHg assuming a right atrial  pressure of 3 mmHg    ECG: NSR with TWI anterolaterally but also lead II     2019 CT Head: No acute stroke, mass, or hemorrhage     19 CXR: No obvious infiltrates      Assessment:  63 y.o. who presented with a mechanical fall. Has had increased weakness. Tn 0.51, 0.56, 0.68. Diffuse Tw inversion on ECG.    Issues:  -NSTEMI  -Weakness  -Fall  -HTN  -DM     Plan:  -Keep K>4, Mg>2.  -asa,   -coreg, HCTZ  -No statin with CK 1000 and LDL 78  -Follow up with me in 1 week 67 y/o male w/ a PMHx of CAD s/p CABG, AF on Eliquis c/b CVA c/b seizures, HTN, HLD, DM type II, hypothyroidism, GI bleed due to a duodenal ulcer, and ESRD s/p DDRT c/b DGF requiring HD & large perinephric hematoma s/p evacuation w/ revision of arterial anastomosis who presented in status epilepticus requiring intubation with a hospital course c/b large right pleural effusion s/p thoracentesis c/b hemothorax while being anticoagulated s/p chest tube placement w/ CT demonstrating retained hemothorax    Slow to answer, appropriate, on Keppra and valproic acid for seizure  S/P extubated, tolerating well. CXR unchanged rt effusion atelectasis  Hemodynamically stable  Possible Po   Gentle hydration  Abx Cefepime for possible UTI, all cultures neg  Transplant/antirejection/ ppx abx  ISS  Mechanical DVT ppx

## 2022-06-14 NOTE — PROGRESS NOTE ADULT - ASSESSMENT
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  - Assess neurological status every 4 hours in the setting of seizures  - Keppra and valproic acid for seizures  - Appreciate neurology recommendations  - Pain control if needed w/ acetaminophen    Resp: intubated for airway protection, large right pleural effusion s/p thoracentesis c/b hemothorax while being anticoagulated s/p chest tube placement  - Out of bed to chair, incentive spirometry, and aggressive chest CT to prevent atelectasis  - Right chest tube to suction  - CT chest on 6/12 w/ retained hemothorax so will undergo VATS on Weds 6/15    CV: CAD, AF  - Monitor vital signs    GI: no acute issues  - NPO except medications; will advance diet today  - Bowel regimen with senna & Miralax  - Protonix for stress ulcer prophylaxis    Renal: s/p DDRT  - NS at 25 mL/hr while NPO  - Tacrolimus and steroids for immunosuppression in the setting of DDRT    Heme: no acute issues  - Monitor CBC and coags  - Venodynes for mechanical VTE prophylaxis; holding chemical VTE prophylaxis in the setting of recent hemothorax    ID: UTI, immunosuppression  - Monitor WBC, temperature, and procalcitonin  - Empiric antibiotics w/ cefepime for UTI but all cultures no growth to date  - Fluconazole, Bactrim, and Valcyte for immunosuppression prophylaxis    Endo: DM type II, HLD, hypothyroidism  - Monitor glucose w/ ISS q4hrs for glycemic control; HgbA1C 6.6% on 4/24  - Atorvastatin for HLD  - Synthroid for hypothyroidism    Code Status: Full code    [facility]    Rebecca Yepez PA-C     [id number]

## 2022-06-14 NOTE — PROGRESS NOTE ADULT - ATTENDING COMMENTS
Pt still extubated, has persistent effusion despite chest tube  PT extubated saturating ok  Kidney function stable, likely has residual ATN  Continue current immunosuppression.  Going for VATS tomorrow. PT extubated saturating ok  Kidney function stable, likely has residual ATN  Continue current immunosuppression.  Going for VATS tomorrow.

## 2022-06-14 NOTE — CONSULT NOTE ADULT - SUBJECTIVE AND OBJECTIVE BOX
Patient is a 66y old  Male who presents with a chief complaint of Status Epilepticus (2022 13:06)    HPI:    66 year old Male with PMHx ESRD s/p permacath removal 5/10/22 s/p Rt DDRT 22,  CVA (), Afib on apixaban, seizure activity, CAD s/p stents, CABG (), HTN, HLD, DM on insulin, gastric/duodenal ulcer, recent hospitalization 22 -5/10/22 with weakness/anemia found to have perinephric hematoma requiring evacuation and repair of bleeding arterial anastomosis. Currently being treated for UTI with Levaquin Today after developing multiple sz episodes refractory to 5 mg versed IM (EMS) and 2 mg ativan IV in E.D. concerning for status epilepticus requiring intubation for hypoxic respiratory  failure. MICU Consult was called for hypoxic respiratory failure secondary to status epilepticus.     In the ED VS temp 97.8 Axillary, Hrt rate 61 RR26 100% on NRB mask   Per ED noted the patient was noted to be gurgling and was unable to protect airway, and was intubated. The patient has now been accepted to the MICU for further management   (10 Raffi 2022 19:11)     prior hospital charts reviewed [  ]  primary team notes reviewed [  ]  other consultant notes reviewed [  ]    PAST MEDICAL & SURGICAL HISTORY:  Hypertension      Diabetes      Dyslipidemia      CAD (Coronary Artery Disease)  with Stents in 2009 and 2019, s/p off-pump C3L on 20      Hypothyroidism      CVA (cerebral vascular accident)  19 with residual bilateral weakness      Anemia      PEG (percutaneous endoscopic gastrostomy) status  removed 2020      Intubation of airway performed without difficulty  Dec 2019  CVA c/b status epilepticus requiring intubation and PEG in 2019-      ESRD on dialysis  M/W/F      Seizures  after CVA, last seizure was last week 22      History of insertion of stent into coronary artery bypass graft   and       S/P CABG x 3  off pump C3L on 20          Allergies  No Known Allergies    ANTIMICROBIALS (past 90 days)  MEDICATIONS  (STANDING):  cefepime   IVPB   100 mL/Hr IV Intermittent (22 @ 03:55)    cefepime   IVPB   100 mL/Hr IV Intermittent (22 @ 21:51)   100 mL/Hr IV Intermittent (22 @ 14:34)    cefepime   IVPB   100 mL/Hr IV Intermittent (22 @ 21:49)   100 mL/Hr IV Intermittent (22 @ 21:12)    fluconAZOLE   40 mG/mL Suspension   200 milliGRAM(s) Oral (22 @ 11:02)   200 milliGRAM(s) Oral (22 @ 12:18)    fluconAZOLE   40 mG/mL Suspension   200 milliGRAM(s) Oral (22 @ 11:48)    fluconAZOLE   Tablet   200 milliGRAM(s) Oral (22 @ 13:49)    piperacillin/tazobactam IVPB...   200 mL/Hr IV Intermittent (06-10-22 @ 16:44)    trimethoprim   80 mG/sulfamethoxazole 400 mG   1 Tablet(s) Oral (22 @ 11:54)    trimethoprim  40 mG/sulfamethoxazole 200 mG Suspension   80 milliGRAM(s) Enteral Tube (22 @ 11:02)    trimethoprim  40 mG/sulfamethoxazole 200 mG Suspension   80 milliGRAM(s) Enteral Tube (22 @ 12:18)    valGANciclovir 50 mG/mL Oral Solution   450 milliGRAM(s) Oral (22 @ 09:14)    vancomycin  IVPB   250 mL/Hr IV Intermittent (22 @ 03:54)      ANTIMICROBIALS:    cefepime   IVPB 1000 every 24 hours  fluconAZOLE   Tablet 200 daily  valGANciclovir 450 <User Schedule>    OTHER MEDS: MEDICATIONS  (STANDING):  acetaminophen     Tablet .. 975 every 6 hours PRN  atorvastatin 40 at bedtime  insulin lispro (ADMELOG) corrective regimen sliding scale  every 6 hours  levETIRAcetam  IVPB 250 every 12 hours  levothyroxine 50 daily  mycophenolate mofetil 500 <User Schedule>  pantoprazole    Tablet 40 before breakfast  polyethylene glycol 3350 17 daily  predniSONE   Tablet 5 daily  senna 2 at bedtime  tacrolimus 0.5 <User Schedule>  valproate sodium  IVPB 750 every 8 hours    SOCIAL HISTORY:   hx smoking  non-smoker    FAMILY HISTORY:  Family history of cancer of tongue (Father)  Parents are  . Father - at 80 years, tongue cancer was a smoker. Mother- at 71, had accident while crossing road. Siblings- 2 brothers and one sister.      REVIEW OF SYSTEMS  [  ] ROS unobtainable because:    [  ] All other systems negative except as noted below:	    Constitutional:  [ ] fever [ ] chills  [ ] weight loss  [ ] weakness  Skin:  [ ] rash [ ] phlebitis	  Eyes: [ ] icterus [ ] pain  [ ] discharge	  ENMT: [ ] sore throat  [ ] thrush [ ] ulcers [ ] exudates  Respiratory: [ ] dyspnea [ ] hemoptysis [ ] cough [ ] sputum	  Cardiovascular:  [ ] chest pain [ ] palpitations [ ] edema	  Gastrointestinal:  [ ] nausea [ ] vomiting [ ] diarrhea [ ] constipation [ ] pain	  Genitourinary:  [ ] dysuria [ ] frequency [ ] hematuria [ ] discharge [ ] flank pain  [ ] incontinence  Musculoskeletal:  [ ] myalgias [ ] arthralgias [ ] arthritis  [ ] back pain  Neurological:  [ ] headache [ ] seizures  [ ] confusion/altered mental status  Psychiatric:  [ ] anxiety [ ] depression	  Hematology/Lymphatics:  [ ] lymphadenopathy  Endocrine:  [ ] adrenal [ ] thyroid  Allergic/Immunologic:	 [ ] transplant [ ] seasonal    Vital Signs Last 24 Hrs  T(F): 98.6 (22 @ 08:00), Max: 100 (22 @ 03:00)  Vital Signs Last 24 Hrs  HR: 62 (22 @ 13:00) (56 - 79)  BP: 157/77 (22 @ 20:00) (157/77 - 157/77)  RR: 12 (22 @ 13:00)  SpO2: 100% (22 @ 13:00) (91% - 100%)  Wt(kg): --    PHYSICAL EXAMINATION:  General: Alert and Awake, NAD  HEENT: PERRL, EOMI, No subconjunctival hemorrhages, Oropharynx Clear, MMM  Neck: Supple, No TINY  Cardiac: RRR, No M/R/G  Resp: CTAB, No Wh/Rh/Ra  Abdomen: NBS, NT/ND, No HSM, No rigidity or guarding  MSK: No LE edema. No stigmata of IE. No evidence of phlebitis. No evidence of synovitis.  : No tucker  Skin: No rashes or lesions. Skin is warm and dry to the touch.   Neuro: Alert and Awake. CN 2-12 Grossly intact. Moves all four extremities spontaneously.  Psych: Calm, Pleasant, Cooperative                          8.8    10.77 )-----------( 184      ( 2022 06:03 )             26.8     06-14    148<H>  |  117<H>  |  54<H>  ----------------------------<  126<H>  4.7   |  18<L>  |  2.04<H>    Ca    8.6      2022 06:03  Phos  3.6     06-14  Mg     2.1     06-14      MICROBIOLOGY:    Culture - Fungal, Body Fluid (collected 2022 02:42)  Source: .Body Fluid Pleural Fluid  Preliminary Report (2022 10:21):    Testing in progress    Culture - Body Fluid with Gram Stain (collected 2022 02:42)  Source: .Body Fluid Pleural Fluid  Gram Stain (2022 12:29):    Few polymorphonuclear leukocytes seen    No organisms seen  Preliminary Report (2022 07:20):    No growth    Culture - Blood (collected 10 Raffi 2022 18:15)  Source: .Blood Blood  Preliminary Report (2022 01:02):    No growth to date.    Culture - Blood (collected 10 Raffi 2022 18:00)  Source: .Blood Blood  Preliminary Report (2022 01:02):    No growth to date.                    RADIOLOGY:    <The imaging below has been reviewed and visualized by me independently. Findings as detailed in report below>     Patient is a 66y old  Male who presents with a chief complaint of Status Epilepticus (2022 13:06)    HPI:    66 year old Male with PMHx ESRD s/p permacath removal 5/10/22 s/p Rt DDRT 22,  CVA (), Afib on apixaban, seizure activity, CAD s/p stents, CABG (), HTN, HLD, DM on insulin, gastric/duodenal ulcer, recent hospitalization 22 -5/10/22 with weakness/anemia found to have perinephric hematoma requiring evacuation and repair of bleeding arterial anastomosis. Currently being treated for UTI with Levaquin Today after developing multiple sz episodes refractory to 5 mg versed IM (EMS) and 2 mg ativan IV in E.D. concerning for status epilepticus requiring intubation for hypoxic respiratory  failure. MICU Consult was called for hypoxic respiratory failure secondary to status epilepticus.     COVID19/FLU/RSV PCR (6/10) Negative  U/A (6/10) with 5 WBC and 25 RBC  Blood Cultures (6/10) NGTD  Pleural Fluid with 71 nucleated cells, 50% PMN, 31% Lymph.   Pleural Fluid Culture () Negative     CT Chest () with Right-sided chest tube with large hemothorax and atelectasis of the right lower lobe.       prior hospital charts reviewed [  ]  primary team notes reviewed [  ]  other consultant notes reviewed [  ]    PAST MEDICAL & SURGICAL HISTORY:  Hypertension      Diabetes      Dyslipidemia      CAD (Coronary Artery Disease)  with Stents in 2009 and 2019, s/p off-pump C3L on 20      Hypothyroidism      CVA (cerebral vascular accident)  19 with residual bilateral weakness      Anemia      PEG (percutaneous endoscopic gastrostomy) status  removed 2020      Intubation of airway performed without difficulty  Dec 2019  CVA c/b status epilepticus requiring intubation and PEG in 2019-      ESRD on dialysis  M/W/F      Seizures  after CVA, last seizure was last week 22      History of insertion of stent into coronary artery bypass graft   and       S/P CABG x 3  off pump C3L on 20          Allergies  No Known Allergies    ANTIMICROBIALS (past 90 days)  MEDICATIONS  (STANDING):  cefepime   IVPB   100 mL/Hr IV Intermittent (22 @ 03:55)    cefepime   IVPB   100 mL/Hr IV Intermittent (22 @ 21:51)   100 mL/Hr IV Intermittent (22 @ 14:34)    cefepime   IVPB   100 mL/Hr IV Intermittent (22 @ 21:49)   100 mL/Hr IV Intermittent (22 @ 21:12)    fluconAZOLE   40 mG/mL Suspension   200 milliGRAM(s) Oral (22 @ 11:02)   200 milliGRAM(s) Oral (22 @ 12:18)    fluconAZOLE   40 mG/mL Suspension   200 milliGRAM(s) Oral (22 @ 11:48)    fluconAZOLE   Tablet   200 milliGRAM(s) Oral (22 @ 13:49)    piperacillin/tazobactam IVPB...   200 mL/Hr IV Intermittent (06-10-22 @ 16:44)    trimethoprim   80 mG/sulfamethoxazole 400 mG   1 Tablet(s) Oral (22 @ 11:54)    trimethoprim  40 mG/sulfamethoxazole 200 mG Suspension   80 milliGRAM(s) Enteral Tube (22 @ 11:02)    trimethoprim  40 mG/sulfamethoxazole 200 mG Suspension   80 milliGRAM(s) Enteral Tube (22 @ 12:18)    valGANciclovir 50 mG/mL Oral Solution   450 milliGRAM(s) Oral (22 @ 09:14)    vancomycin  IVPB   250 mL/Hr IV Intermittent (22 @ 03:54)      ANTIMICROBIALS:    cefepime   IVPB 1000 every 24 hours  fluconAZOLE   Tablet 200 daily  valGANciclovir 450 <User Schedule>    OTHER MEDS: MEDICATIONS  (STANDING):  acetaminophen     Tablet .. 975 every 6 hours PRN  atorvastatin 40 at bedtime  insulin lispro (ADMELOG) corrective regimen sliding scale  every 6 hours  levETIRAcetam  IVPB 250 every 12 hours  levothyroxine 50 daily  mycophenolate mofetil 500 <User Schedule>  pantoprazole    Tablet 40 before breakfast  polyethylene glycol 3350 17 daily  predniSONE   Tablet 5 daily  senna 2 at bedtime  tacrolimus 0.5 <User Schedule>  valproate sodium  IVPB 750 every 8 hours    SOCIAL HISTORY:   hx smoking  non-smoker    FAMILY HISTORY:  Family history of cancer of tongue (Father)  Parents are  . Father - at 80 years, tongue cancer was a smoker. Mother- at 71, had accident while crossing road. Siblings- 2 brothers and one sister.      REVIEW OF SYSTEMS  [  ] ROS unobtainable because:    [  ] All other systems negative except as noted below:	    Constitutional:  [ ] fever [ ] chills  [ ] weight loss  [ ] weakness  Skin:  [ ] rash [ ] phlebitis	  Eyes: [ ] icterus [ ] pain  [ ] discharge	  ENMT: [ ] sore throat  [ ] thrush [ ] ulcers [ ] exudates  Respiratory: [ ] dyspnea [ ] hemoptysis [ ] cough [ ] sputum	  Cardiovascular:  [ ] chest pain [ ] palpitations [ ] edema	  Gastrointestinal:  [ ] nausea [ ] vomiting [ ] diarrhea [ ] constipation [ ] pain	  Genitourinary:  [ ] dysuria [ ] frequency [ ] hematuria [ ] discharge [ ] flank pain  [ ] incontinence  Musculoskeletal:  [ ] myalgias [ ] arthralgias [ ] arthritis  [ ] back pain  Neurological:  [ ] headache [ ] seizures  [ ] confusion/altered mental status  Psychiatric:  [ ] anxiety [ ] depression	  Hematology/Lymphatics:  [ ] lymphadenopathy  Endocrine:  [ ] adrenal [ ] thyroid  Allergic/Immunologic:	 [ ] transplant [ ] seasonal    Vital Signs Last 24 Hrs  T(F): 98.6 (22 @ 08:00), Max: 100 (22 @ 03:00)  Vital Signs Last 24 Hrs  HR: 62 (22 @ 13:00) (56 - 79)  BP: 157/77 (22 @ 20:00) (157/77 - 157/77)  RR: 12 (22 @ 13:00)  SpO2: 100% (22 @ 13:00) (91% - 100%)  Wt(kg): --    PHYSICAL EXAMINATION:  General: Alert and Awake, NAD  HEENT: PERRL, EOMI, No subconjunctival hemorrhages, Oropharynx Clear, MMM  Neck: Supple, No TINY  Cardiac: RRR, No M/R/G  Resp: CTAB, No Wh/Rh/Ra  Abdomen: NBS, NT/ND, No HSM, No rigidity or guarding  MSK: No LE edema. No stigmata of IE. No evidence of phlebitis. No evidence of synovitis.  : No tucker  Skin: No rashes or lesions. Skin is warm and dry to the touch.   Neuro: Alert and Awake. CN 2-12 Grossly intact. Moves all four extremities spontaneously.  Psych: Calm, Pleasant, Cooperative                          8.8    10.77 )-----------( 184      ( 2022 06:03 )             26.8     06-14    148<H>  |  117<H>  |  54<H>  ----------------------------<  126<H>  4.7   |  18<L>  |  2.04<H>    Ca    8.6      2022 06:03  Phos  3.6     06-14  Mg     2.1     06-14      MICROBIOLOGY:    Culture - Fungal, Body Fluid (collected 2022 02:42)  Source: .Body Fluid Pleural Fluid  Preliminary Report (2022 10:21):    Testing in progress    Culture - Body Fluid with Gram Stain (collected 2022 02:42)  Source: .Body Fluid Pleural Fluid  Gram Stain (2022 12:29):    Few polymorphonuclear leukocytes seen    No organisms seen  Preliminary Report (2022 07:20):    No growth    Culture - Blood (collected 10 Raffi 2022 18:15)  Source: .Blood Blood  Preliminary Report (2022 01:02):    No growth to date.    Culture - Blood (collected 10 Raffi 2022 18:00)  Source: .Blood Blood  Preliminary Report (2022 01:02):    No growth to date.    RADIOLOGY:    <The imaging below has been reviewed and visualized by me independently. Findings as detailed in report below>    ACC: [id number] EXAM:  CT BRAIN                        PROCEDURE DATE:  06/10/2022    No acute intracranial hemorrhage. Chronic findings as above.    ACC: [id number] EXAM:  CT CHEST                        PROCEDURE DATE:  2022    Right-sided chest tube with large hemothorax and atelectasis of the right lower lobe.    ACC: [id number] EXAM:  US KIDNEY TRANSPLANT W DOPP RT                        PROCEDURE DATE:  2022    Small free fluid adjacent to the upper pole of the transplant kidney.   Elevated resistive indices throughout the transplant with increased renal   cortical echogenicity, nonspecific findings. Correlate with renal   function.     Patient is a 66y old  Male who presents with a chief complaint of Status Epilepticus (2022 13:06)    HPI:    66 year old Male with PMHx ESRD s/p permacath removal 5/10/22 s/p Rt DDRT 22,  CVA (), Afib on apixaban, seizure activity, CAD s/p stents, CABG (), HTN, HLD, DM on insulin, gastric/duodenal ulcer, recent hospitalization 22 -5/10/22 with weakness/anemia found to have perinephric hematoma requiring evacuation and repair of bleeding arterial anastomosis. Currently being treated for UTI with Levaquin Today after developing multiple sz episodes refractory to 5 mg versed IM (EMS) and 2 mg ativan IV in E.D. concerning for status epilepticus requiring intubation for hypoxic respiratory  failure. MICU Consult was called for hypoxic respiratory failure secondary to status epilepticus.     - 6/10: transferred from rehab facility for seizure status epilepticus. Intubated for respiratory protection and transferred to MICU. EEG done no seizure activity recorded.  Neuro following/adjusting meds  - : Extubated. Found to have pleural effusion on eliquis Underwent Thoracentesis 1.3L. eliquis changed to heparin drip, post procedure drop in H&H. 5u PRBC, 2 u FFP.    -  evening Transferred to SICU for further care in setting of hypoxia, significant R pleural effusion, hgb 6 and hemodynamic instability  -  Intubated at 9pm and sedated on Precedex.  CT placed, 600ml blood drained.  1L total overnight, started pressors  -  Received Protamine for PTT >100 (was on Heparin drip started for AF), 2 u FFP and 5u PRBC total    COVID19/FLU/RSV PCR (6/10) Negative  U/A (6/10) with 5 WBC and 25 RBC  Blood Cultures (6/10) NGTD  Pleural Fluid with 71 nucleated cells, 50% PMN, 31% Lymph.   Pleural Fluid Culture () Negative   CT Chest () with Right-sided chest tube with large hemothorax and atelectasis of the right lower lobe.       prior hospital charts reviewed [  ]  primary team notes reviewed [ x ]  other consultant notes reviewed [x  ]    PAST MEDICAL & SURGICAL HISTORY:  Hypertension      Diabetes      Dyslipidemia      CAD (Coronary Artery Disease)  with Stents in 2009 and 2019, s/p off-pump C3L on 20      Hypothyroidism      CVA (cerebral vascular accident)  19 with residual bilateral weakness      Anemia      PEG (percutaneous endoscopic gastrostomy) status  removed 2020      Intubation of airway performed without difficulty  Dec 2019  CVA c/b status epilepticus requiring intubation and PEG in 2019-      ESRD on dialysis  M/W/F      Seizures  after CVA, last seizure was last week 22      History of insertion of stent into coronary artery bypass graft   and       S/P CABG x 3  off pump C3L on 20          Allergies  No Known Allergies    ANTIMICROBIALS (past 90 days)  MEDICATIONS  (STANDING):  cefepime   IVPB   100 mL/Hr IV Intermittent (22 @ 03:55)    cefepime   IVPB   100 mL/Hr IV Intermittent (22 @ 21:51)   100 mL/Hr IV Intermittent (22 @ 14:34)    cefepime   IVPB   100 mL/Hr IV Intermittent (22 @ 21:49)   100 mL/Hr IV Intermittent (22 @ 21:12)    fluconAZOLE   40 mG/mL Suspension   200 milliGRAM(s) Oral (22 @ 11:02)   200 milliGRAM(s) Oral (22 @ 12:18)    fluconAZOLE   40 mG/mL Suspension   200 milliGRAM(s) Oral (22 @ 11:48)    fluconAZOLE   Tablet   200 milliGRAM(s) Oral (22 @ 13:49)    piperacillin/tazobactam IVPB...   200 mL/Hr IV Intermittent (06-10-22 @ 16:44)    trimethoprim   80 mG/sulfamethoxazole 400 mG   1 Tablet(s) Oral (22 @ 11:54)    trimethoprim  40 mG/sulfamethoxazole 200 mG Suspension   80 milliGRAM(s) Enteral Tube (22 @ 11:02)    trimethoprim  40 mG/sulfamethoxazole 200 mG Suspension   80 milliGRAM(s) Enteral Tube (22 @ 12:18)    valGANciclovir 50 mG/mL Oral Solution   450 milliGRAM(s) Oral (22 @ 09:14)    vancomycin  IVPB   250 mL/Hr IV Intermittent (22 @ 03:54)      ANTIMICROBIALS:    cefepime   IVPB 1000 every 24 hours  fluconAZOLE   Tablet 200 daily  valGANciclovir 450 <User Schedule>    OTHER MEDS: MEDICATIONS  (STANDING):  acetaminophen     Tablet .. 975 every 6 hours PRN  atorvastatin 40 at bedtime  insulin lispro (ADMELOG) corrective regimen sliding scale  every 6 hours  levETIRAcetam  IVPB 250 every 12 hours  levothyroxine 50 daily  mycophenolate mofetil 500 <User Schedule>  pantoprazole    Tablet 40 before breakfast  polyethylene glycol 3350 17 daily  predniSONE   Tablet 5 daily  senna 2 at bedtime  tacrolimus 0.5 <User Schedule>  valproate sodium  IVPB 750 every 8 hours    SOCIAL HISTORY:  no smoking or etoh use     FAMILY HISTORY:  Family history of cancer of tongue (Father)  Parents are  . Father - at 80 years, tongue cancer was a smoker. Mother- at 71, had accident while crossing road. Siblings- 2 brothers and one sister.      REVIEW OF SYSTEMS  [  ] ROS unobtainable because:    [ x ] All other systems negative except as noted below:	    Constitutional:  [ ] fever [ ] chills  [ ] weight loss  [ x] weakness  Skin:  [ ] rash [ ] phlebitis	  Eyes: [ ] icterus [ ] pain  [ ] discharge	  ENMT: [ ] sore throat  [ ] thrush [ ] ulcers [ ] exudates  Respiratory: [ ] dyspnea [ ] hemoptysis [ ] cough [ ] sputum	  Cardiovascular:  [ ] chest pain [ ] palpitations [ ] edema	  Gastrointestinal:  [ ] nausea [ ] vomiting [ ] diarrhea [ ] constipation [ ] pain	  Genitourinary:  [ ] dysuria [ ] frequency [ ] hematuria [ ] discharge [ ] flank pain  [ ] incontinence  Musculoskeletal:  [ ] myalgias [ ] arthralgias [ ] arthritis  [ ] back pain  Neurological:  [ ] headache [ ] seizures  [ ] confusion/altered mental status  Psychiatric:  [ ] anxiety [ ] depression	  Hematology/Lymphatics:  [ ] lymphadenopathy  Endocrine:  [ ] adrenal [ ] thyroid  Allergic/Immunologic:	 [ ] transplant [ ] seasonal    Vital Signs Last 24 Hrs  T(F): 98.6 (22 @ 08:00), Max: 100 (22 @ 03:00)  Vital Signs Last 24 Hrs  HR: 62 (22 @ 13:00) (56 - 79)  BP: 157/77 (22 @ 20:00) (157/77 - 157/77)  RR: 12 (22 @ 13:00)  SpO2: 100% (22 @ 13:00) (91% - 100%)  Wt(kg): --    PHYSICAL EXAMINATION:  General: Alert and Awake, NAD  HEENT: PERRL, EOMI  Neck: Supple  Cardiac: RRR, No M/R/G  Resp: CTAB, No Wh/Rh/Ra  Abdomen: NBS, ND, renal transplant site with no wound  MSK: No LE edema. No calf tenderness  : + tucker  Skin: No rashes or lesions. Skin is warm and dry to the touch.   Neuro: Alert and Awake. CN 2-12 Grossly intact. Moves all four extremities spontaneously.  Psych: Calm, Pleasant, Cooperative                          8.8    10.77 )-----------( 184      ( 2022 06:03 )             26.8     06-14    148<H>  |  117<H>  |  54<H>  ----------------------------<  126<H>  4.7   |  18<L>  |  2.04<H>    Ca    8.6      2022 06:03  Phos  3.6     06-14  Mg     2.1     06-14      MICROBIOLOGY:    Culture - Fungal, Body Fluid (collected 2022 02:42)  Source: .Body Fluid Pleural Fluid  Preliminary Report (2022 10:21):    Testing in progress    Culture - Body Fluid with Gram Stain (collected 2022 02:42)  Source: .Body Fluid Pleural Fluid  Gram Stain (2022 12:29):    Few polymorphonuclear leukocytes seen    No organisms seen  Preliminary Report (2022 07:20):    No growth    Culture - Blood (collected 10 Raffi 2022 18:15)  Source: .Blood Blood  Preliminary Report (2022 01:02):    No growth to date.    Culture - Blood (collected 10 Raffi 2022 18:00)  Source: .Blood Blood  Preliminary Report (2022 01:02):    No growth to date.    RADIOLOGY:    <The imaging below has been reviewed and visualized by me independently. Findings as detailed in report below>    ACC: [id number] EXAM:  CT BRAIN                        PROCEDURE DATE:  06/10/2022    No acute intracranial hemorrhage. Chronic findings as above.    ACC: [id number] EXAM:  CT CHEST                        PROCEDURE DATE:  2022    Right-sided chest tube with large hemothorax and atelectasis of the right lower lobe.    ACC: [id number] EXAM:  US KIDNEY TRANSPLANT W DOPP RT                        PROCEDURE DATE:  2022    Small free fluid adjacent to the upper pole of the transplant kidney.   Elevated resistive indices throughout the transplant with increased renal   cortical echogenicity, nonspecific findings. Correlate with renal   function.

## 2022-06-14 NOTE — OCCUPATIONAL THERAPY INITIAL EVALUATION ADULT - LLE MMT, REHAB EVAL
20  Blaine Kanner Dupont : 1979 Sex: female  Age 42 y.o. Subjective:  Chief Complaint   Patient presents with    Back Pain     R side going down back of leg, no known injury    Other     tested for covid due to exposure from work, no symptoms          HPI:   Deion Horner , 42 y.o. female presents to [facility] care for evaluation of right low back pain with radiation down the right leg. The patient was recently exposed to someone a that she had a meeting with last week that tested positive for COVID-19. The patient is having pain in the low back that does radiate down the right leg. The patient is not having any bladder or bowel incontinence, urinary tension or saddle anesthesia. The patient is not any dysuria hematuria. No abdominal pain. No direct trauma to the back. The patient is not having fevers. No history of carcinoma. No history of IV drug use        ROS:   Unless otherwise stated in this report the patient's positive and negative responses for review of systems for constitutional, eyes, ENT, cardiovascular, respiratory, gastrointestinal, neurological, , musculoskeletal, and integument systems and related systems to the presenting problem are either stated in the history of present illness or were not pertinent or were negative for the symptoms and/or complaints related to the presenting medical problem. Positives and pertinent negatives as per HPI. All others reviewed and are negative. PMH:     Past Medical History:   Diagnosis Date    Kidney stones        History reviewed. No pertinent surgical history. History reviewed. No pertinent family history.     Medications:     Current Outpatient Medications:     oxaprozin (DAYPRO) 600 MG tablet, Take 1 tablet by mouth 2 times daily for 7 days Take on full stomach and with a full glass of water, Disp: 14 tablet, Rfl: 0    methocarbamol (ROBAXIN-750) 750 MG tablet, Take 1 tablet by mouth 4 times daily for 10 days, Disp: 40 try to improve the symptoms. The patient states that it was worse over the weekend. We will treat the patient with Daypro and with Robaxin. The patient is to ice and heat at this point. If the symptoms do not improve by Friday we will have the patient return in 1 we will schedule an MRI and consider further therapies. The patient may also need evaluation with chiropractor. The patient is to call or return if any of the signs or symptoms worsen. The patient is to follow-up with PCP in the next 2-3 days for repeat evaluation repeat assessment or go directly to the emergency department. Clinical Impression:   Yanira GONZALEZ was seen today for back pain and other. Diagnoses and all orders for this visit:    Acute right-sided low back pain with right-sided sciatica    Other orders  -     oxaprozin (DAYPRO) 600 MG tablet; Take 1 tablet by mouth 2 times daily for 7 days Take on full stomach and with a full glass of water  -     methocarbamol (ROBAXIN-750) 750 MG tablet; Take 1 tablet by mouth 4 times daily for 10 days        The patient is to call for any concerns or return if any of the signs or symptoms worsen. The patient is to follow-up with PCP in the next 2-3 days for repeat evaluation repeat assessment or go directly to the emergency department.      SIGNATURE: Jewell Dewey III, PA-C 3+/5

## 2022-06-14 NOTE — PROGRESS NOTE ADULT - SUBJECTIVE AND OBJECTIVE BOX
[facility] DIVISION OF KIDNEY DISEASES AND HYPERTENSION -- FOLLOW UP NOTE  --------------------------------------------------------------------------------  HPI:  66 yr old man with ESRD due to DM was on HD since 2019, s/p DCD DDRT on 4/21/22. Donor was 58 , KDPI 82%, DCD, single vessels and ureter, HLA mismatch 1, 2, 2. No DSA, cPRA 0%. 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 Readmitted in May for anemia in the setting of large perinephric hematoma s/p evacuation and repair of arterial anastomosis on 5/2/22. Intra operative biopsy showed no rejection, Creatinine ranging 2mg/dL. He was recently dx with klebsiella UTI rx with ertapenem - then switched to Levaquin day #6. He also had parainfluenza pneumonia. Was at rehab progressing well. Presented with status epilepticus. Intubated for respiratory protection. Right pleural effusion drained 1300ml. Transfused 1 unit PRBC. Pt. transferred to SICU and was transfused 4 additional units.     Pt. seen this AM in SICU. Pt. overnight extubated. Transfused 1 unit pRBCs. SCr. remains stable and improving overall.     PAST HISTORY  --------------------------------------------------------------------------------  No significant changes to PMH, PSH, FHx, SHx, unless otherwise noted    ALLERGIES & MEDICATIONS  --------------------------------------------------------------------------------  Allergies    No Known Allergies    Intolerances      Standing Inpatient Medications  atorvastatin 40 milliGRAM(s) Oral at bedtime  cefepime   IVPB 1000 milliGRAM(s) IV Intermittent every 24 hours  chlorhexidine 2% Cloths 1 Application(s) Topical <User Schedule>  fluconAZOLE   Tablet 200 milliGRAM(s) Oral daily  insulin lispro (ADMELOG) corrective regimen sliding scale   SubCutaneous every 4 hours  levETIRAcetam  IVPB 250 milliGRAM(s) IV Intermittent every 12 hours  levothyroxine 50 MICROGram(s) Oral daily  magnesium oxide 400 milliGRAM(s) Oral every 8 hours  multivitamin 1 Tablet(s) Oral daily  pantoprazole    Tablet 40 milliGRAM(s) Oral before breakfast  polyethylene glycol 3350 17 Gram(s) Oral daily  predniSONE   Tablet 5 milliGRAM(s) Oral daily  senna 2 Tablet(s) Oral at bedtime  sodium chloride 0.9%. 1000 milliLiter(s) IV Continuous <Continuous>  tacrolimus 0.5 milliGRAM(s) Oral <User Schedule>  valGANciclovir 450 milliGRAM(s) Oral <User Schedule>  valproate sodium  IVPB 750 milliGRAM(s) IV Intermittent every 8 hours    PRN Inpatient Medications  acetaminophen     Tablet .. 975 milliGRAM(s) Oral every 6 hours PRN      REVIEW OF SYSTEMS  --------------------------------------------------------------------------------  Gen: No fevers/chills  Respiratory: No dyspnea on face mask  CV: No chest pain, PND, orthopnea  GI: No abdominal pain, diarrhea, constipation, nausea, vomiting  Transplant: No pain  : No changes in urination  MSK: No edema  Neuro: No dizziness/lightheadedness    All other systems were reviewed and are negative, except as noted.    VITALS/PHYSICAL EXAM  --------------------------------------------------------------------------------  T(C): 36.6 (06-14-22 @ 03:00), Max: 36.7 (06-13-22 @ 19:00)  HR: 68 (06-14-22 @ 07:00) (54 - 79)  BP: 157/77 (06-13-22 @ 20:00) (157/77 - 157/77)  RR: 16 (06-14-22 @ 07:00) (14 - 24)  SpO2: 100% (06-14-22 @ 07:00) (94% - 100%)  Wt(kg): --        06-13-22 @ 07:01  -  06-14-22 @ 07:00  --------------------------------------------------------  IN: 1621.1 mL / OUT: 2495 mL / NET: -873.9 mL    Physical Exam:  	Gen: Awake, sitting up on Face mask  	HEENT: PERRL, MMM   	Pulm: CTAB anteriorly  	CV: S1S2+  	Abd: +BS, soft, non-tender          Transplant: No tenderness, swelling  	: No suprapubic tenderness  	MSK: no edema   	Psych: normal affect   	Skin: Warm          Access: CVC catheter      LABS/STUDIES  --------------------------------------------------------------------------------              8.8    10.77 >-----------<  184      [06-14-22 @ 06:03]              26.8     148  |  117  |  54  ----------------------------<  126      [06-14-22 @ 06:03]  4.7   |  18  |  2.04        Ca     8.6     [06-14-22 @ 06:03]      Mg     2.1     [06-14-22 @ 06:03]      Phos  3.6     [06-14-22 @ 06:03]      PT/INR: PT 16.1 , INR 1.38       [06-14-22 @ 06:03]  PTT: 32.9       [06-14-22 @ 06:03]      Creatinine Trend:  SCr 2.04 [06-14 @ 06:03]  SCr 2.09 [06-13 @ 16:32]  SCr 2.23 [06-13 @ 05:59]  SCr 2.31 [06-13 @ 00:41]  SCr 2.37 [06-12 @ 13:41]    Tacrolimus (), Serum: 9.8 ng/mL (06-13 @ 05:59)  Tacrolimus (), Serum: 3.6 ng/mL (06-11 @ 06:44)            Urinalysis - [06-10-22 @ 22:28]      Color Light Yellow / Appearance Clear / SG 1.016 / pH 6.0      Gluc 100 mg/dL / Ketone Negative  / Bili Negative / Urobili Negative       Blood Small / Protein Trace / Leuk Est Negative / Nitrite Negative      RBC 25 / WBC 5 / Hyaline  / Gran  / Sq Epi  / Non Sq Epi 1 / Bacteria Negative    Urine Creatinine 38      [06-11-22 @ 09:32]  Urine Protein 18      [06-11-22 @ 09:32]  Urine Sodium 50      [06-11-22 @ 09:32]  Urine Potassium 10      [06-11-22 @ 09:32]  Urine Chloride <20      [06-11-22 @ 09:32]    Iron 17, TIBC 171, %sat 10      [05-02-22 @ 13:03]  Ferritin 1505      [05-02-22 @ 13:05]  PTH -- (Ca 9.2)      [02-05-22 @ 10:13]   294  HbA1c 6.0      [02-21-20 @ 08:53]  Lipid: chol 118, TG 54, HDL 59, LDL --      [06-27-21 @ 09:44]

## 2022-06-14 NOTE — OCCUPATIONAL THERAPY INITIAL EVALUATION ADULT - PRECAUTIONS/LIMITATIONS, REHAB EVAL
Presented from rehab 6/10 with status epilepticus secondary to likely UTI, intubated for airway protection, s/p right thoracentesis complicated by hemothorax. Extubated 6/13./fall precautions

## 2022-06-14 NOTE — OCCUPATIONAL THERAPY INITIAL EVALUATION ADULT - PERTINENT HX OF CURRENT PROBLEM, REHAB EVAL
66 yr old man with PMHx DM type II, HTN, CAD s/p CABG in 2020, Afib on Eliquis, CVA in 2019 due to Afib, Seizure d/o following CVA last episode was on 4/8/22, h/o GI bleed in 2/2022 EGD with duodenal ulcer,   ESRD s/p DDRT on 4/21/22 (course complicated by DGF, was on HD until 4/29/22, large perinephric hematoma s/p evacuation and repair of arterial anastomosis on 5/2/22).

## 2022-06-14 NOTE — PROGRESS NOTE ADULT - PROBLEM SELECTOR PLAN 1
S/p DDRT on 4/21/22 Cr. stable in 2 range. ANA in the setting of hemodynamic instability 2/2 ATN. Donor was 58 , KDPI 82%, DCD, single vessels and ureter, HLA mismatch 1, 2, 2. No DSA, cPRA 0%. 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 Readmitted in May for anemia in the setting of large perinephric hematoma s/p evacuation and repair of arterial anastomosis on 5/2/22. Intra operative biopsy showed no rejection. As outpatient SCr. in th low 2 range. Admitted with lowest documented Cr. of 1.8 since transplant which has trended to 2.0 today. C/w cefepime for UTI, follow cultures. Optimize hemodynamics.

## 2022-06-14 NOTE — OCCUPATIONAL THERAPY INITIAL EVALUATION ADULT - LIVES WITH, PROFILE
Pt admitted from [facility] where he was progressing in ambulation and ADL with some assist. Prior to recent hospitalizations, pt's son states he was IND. Pt's family requesting pt be d/c'd home after this admission where he will live in a house with his two sons, 4 steps to enter, first floor set-up, +shower tub.

## 2022-06-14 NOTE — PROGRESS NOTE ADULT - SUBJECTIVE AND OBJECTIVE BOX
Transplant Surgery - Consult Note  --------------------------------------------------------------  DCD DDRT     Date: 4/21/22    Present:   Patient seen and examined with multidisciplinary team including Transplant Surgeon: Dr. David, Transplant Nephrologist: Dr. Kindra PRINCE,  nephrology fellow Dr. Pantoja, transplant pharmacist VENICE Lim, NP Jensen and unit RN during am rounds.  Disciplines not in attendance will be notified of the plan.     HPI: 66 yr old man with PMH: DM type II, HTN, CAD s/p CABG in 2020, Afib on Eliquis, CVA in 2019 due to Afib, Seizure d/o following CVA last episode was on 4/8/22, h/o GI bleed in 2/2022 EGD with duodenal ulcer.  ESRD due to DM was on HD since 2019, s/p DCD DDRT on 4/21/22.  Donor was 58 , KDPI 82%, DCD, single vessels and ureter, HLA mismatch 1, 2, 2. No DSA, cPRA 0%. 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 Was Readmitted in May for anemia in the setting of large perinephric hematoma s/p evacuation and repair of arterial anastomosis on 5/2/22. Intra operative biopsy showed no rejection, Creatinine ranging ~2mg/dL.  In Rehab: He was recently dx with klebsiella UTI rx with ertapenem - then switched to Levaquin day #6.    He also had parainfluenza pneumonia. Was at rehab progressing well.      Hospital course:  - 6/10: transferred from rehab facility for seizure status epilepticus. Intubated for respiratory protection and transferred to MICU. EEG done no seizure activity recorded.  Neuro following/adjusting meds  - 6/11: Extubated. Found to have pleural effusion on eliquis Underwent Thoracentesis 1.3L. eliquis changed to heparin drip, post procedure drop in H&H. 5u PRBC, 2 u FFP.    - 6/11 evening Transferred to SICU for further care in setting of hypoxia, significant R pleural effusion, hgb 6 and hemodynamic instability  - 6/11 Intubated at 9pm and sedated on Precedex.  CT placed, 600ml blood drained.  1L total overnight, started pressors  - 6/11 Received Protamine for PTT >100 (was on Heparin drip started for AF), 2 u FFP and 5u PRBC total    Interval Events:  - S/P successful extubation yesterday on AFM with stable O2sat  - BP stable off pressors    - On Cefepime for UTI.  6/10 BCx ngtd   6/11 pleural fluid Cx: ngtd  - MMF held       Immunosuppression:   Induction:  Thymoglobulin                                        Maintenance immunosuppression: Tacrolimus 1mg per ngt bid, start MMF 500mg BID, Pred 5  Ongoing monitoring for signs of rejection.    Potential Discharge date: pending clinical improvement    Education:  Medications    Plan of care:  See Below              REVIEW OF SYSTEMS  --------------------------------------------------------------------------------  Gen: No weight changes, fatigue, fevers/chills, weakness  Skin: No rashes  Head/Eyes/Ears/Mouth: No headache; Normal hearing; Normal vision w/o blurriness; No sinus pain/discomfort, sore throat  Respiratory: No dyspnea, cough, wheezing, hemoptysis  CV: No chest pain, PND, orthopnea  GI: No abdominal pain, diarrhea, constipation, nausea, vomiting, melena, hematochezia  : No increased frequency, dysuria, hematuria, nocturia  MSK: No joint pain/swelling; no back pain; no edema  Neuro: No dizziness/lightheadedness, weakness, seizures, numbness, tingling  Heme: No easy bruising or bleeding  Endo: No heat/cold intolerance  Psych: No significant nervousness, anxiety, stress, depression  All other systems were reviewed and are negative, except as noted.        PHYSICAL EXAM:  Constitutional: Well developed / well nourished  Eyes: Anicteric, PERRLA  ENMT: nc/at  Neck: supple  Respiratory: CTA anterior. R CT to sxn with bloody drainage  Cardiovascular: RRR  Gastrointestinal: Soft, non distended, NT, incision healed   Genitourinary: tucker  Extremities: SCD's in place and working bilaterally, no edema  Vascular: Palpable dp pulses bilaterally  Neurological: A&O x3  Skin: no rashes, ulcerations or lesions  Musculoskeletal: Moving all extremities  Psychiatric: Responsive

## 2022-06-14 NOTE — OCCUPATIONAL THERAPY INITIAL EVALUATION ADULT - NS ASR FOLLOW COMMAND OT EVAL
with significantly increased processing time. Answers simple questions with increased time & hypophonic speech./75% of the time/able to follow single-step instructions

## 2022-06-14 NOTE — PROGRESS NOTE ADULT - ASSESSMENT
Mr. Castelan is a 66 yr old man with PMHx DM type II, HTN, CAD s/p CABG in 2020, Afib on Eliquis, CVA in 2019 due to Afib, Seizure d/o following CVA last episode was on 4/8/22, h/o GI bleed in 2/2022 EGD with duodenal ulcer,     ESRD s/p DDRT on 4/21/22 (course complicated by DGF, was on HD until 4/29/22, large perinephric hematoma s/p evacuation and repair of arterial anastomosis on 5/2/22) presented from rehab 6/10 with status epilepticus secondary to likely UTI, intubated for airway protection, s/p right thoracentesis complicated by hemothorax.  - 6/11 chest tube placed- 1liter drained   6/12-  intubated sedated CT scan completed  6/13  - Intubated   Chest tube 130/530 for OR with Dr Stover date Wednesday Lizett 15 at 1330  6/14   remains extubated,     vss   spoke to son  received consent for OR   in am  0730  rt chest tube

## 2022-06-14 NOTE — PROGRESS NOTE ADULT - PROBLEM SELECTOR PLAN 2
C/w prograf 2mg bid. Obtain tacrolimus level 30min prior to AM dose. C/w prednisone 5mg daily. Hold CellCept for now. Consider changing tacro to belatacept since tacrolimus can lower seizure threshold. C/w bactrim, Valcyte, fluconazole 200mg daily (on fluconazole after repair of arterial anastomosis) for prophylaxis.    If you have any questions, please feel free to contact me  Ant Pantoja  Nephrology Fellow  [phone]; Prefer [employer] TEAMS  (After 5pm or on weekends please page the on-call fellow) C/w prograf 0.5mg bid. Obtain tacrolimus level 30min prior to AM dose. C/w prednisone 5mg daily. Resume half-dose cellcept. Will consider changing tacro to belatacept since tacrolimus can lower seizure threshold. C/w bactrim, Valcyte, fluconazole 200mg daily (on fluconazole after repair of arterial anastomosis) for prophylaxis.    If you have any questions, please feel free to contact me  Ant Pantoja  Nephrology Fellow  [phone]; Prefer [employer] TEAMS  (After 5pm or on weekends please page the on-call fellow)

## 2022-06-14 NOTE — PROGRESS NOTE ADULT - SUBJECTIVE AND OBJECTIVE BOX
VITAL SIGNS        Vital Signs Last 24 Hrs  T(C): 37 (06-14-22 @ 08:00), Max: 37 (06-14-22 @ 08:00)  T(F): 98.6 (06-14-22 @ 08:00), Max: 98.6 (06-14-22 @ 08:00)  HR: 64 (06-14-22 @ 10:00) (54 - 79)  BP: 157/77 (06-13-22 @ 20:00) (157/77 - 157/77)  RR: 12 (06-14-22 @ 10:00) (12 - 24)  SpO2: 100% (06-14-22 @ 10:00) (91% - 100%)                   Daily     Daily         CAPILLARY BLOOD GLUCOSE      POCT Blood Glucose.: 156 mg/dL (14 Jun 2022 12:07)  POCT Blood Glucose.: 164 mg/dL (14 Jun 2022 09:42)  POCT Blood Glucose.: 123 mg/dL (14 Jun 2022 05:52)  POCT Blood Glucose.: 174 mg/dL (14 Jun 2022 02:45)  POCT Blood Glucose.: 148 mg/dL (13 Jun 2022 21:47)  POCT Blood Glucose.: 189 mg/dL (13 Jun 2022 17:09)  POCT Blood Glucose.: 202 mg/dL (13 Jun 2022 13:02)          Drains:  rt chest tube                      PHYSICAL EXAM    Neurology: alert and oriented x 3, moves all extremities with no defecits  CV :  RR  Lungs:    rt side diminshed  Abdomen: soft, nontender, nondistended, positive bowel sounds,  Extremities:     no edema

## 2022-06-14 NOTE — PROGRESS NOTE ADULT - NUTRITIONAL ASSESSMENT
This patient has been assessed with a concern for Malnutrition and has been determined to have a diagnosis/diagnoses of Severe protein-calorie malnutrition.    This patient is being managed with:   Diet NPO-  Except Medications  Entered: Jun 13 2022  5:02PM

## 2022-06-14 NOTE — PROGRESS NOTE ADULT - NUTRITIONAL ASSESSMENT
This patient has been assessed with a concern for Malnutrition and has been determined to have a diagnosis/diagnoses of Severe protein-calorie malnutrition.    This patient is being managed with:   Diet NPO with Tube Feed-  Supplement Feeding Modality:  Oral  Ensure Pudding Cans or Servings Per Day:  1       Frequency:  Three Times a day  Entered: Jun 14 2022 10:07AM

## 2022-06-14 NOTE — PROGRESS NOTE ADULT - ASSESSMENT
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 Creatinine trending down to 2-2.2mg/dL. H/o seizure d/o with last seizure episode on 4/8/22. Admitted for status epilepticus in the setting of sub therapeutic valproic acid levels. Had right thoracentesis done on 6/10 for effusion complicated by large hemothorax, hemorrhagic shock requiring PRBC x 5 units and vasopressors, chest tube placed. BP now stable on low dose phenylephrine. Now extubated.

## 2022-06-14 NOTE — PROGRESS NOTE ADULT - SUBJECTIVE AND OBJECTIVE BOX
HISTORY:  65 y/o male w/ a PMHx of CAD s/p CABG (2020), AF on Eliquis c/b CVA (2019) c/b seizures, HTN, HLD, DM type II, hypothyroidism, GI bleed due to a duodenal ulcer (2/2022), and ESRD s/p DDRT (4/21/22) c/b DGF requiring HD (last session 4/29/22) & large perinephric hematoma s/p evacuation w/ revision of arterial anastomosis (5/2/22) who presented on 6/10 for status epilepticus. Patient was intubated for airway protection and transferred to MICU for further management. Patient was loaded w/ valproic acid and continued his home Keppra w/ resolution of his seizures on EEG so he was extubated on 6/11. Patient was noted to have a large right pleural effusion so thoracentesis was performed w/ 1.3 L of serosanguineous fluid drained. Patient was started on a heparin infusion for his atrial fibrillation post-procedure but had a H&H drop w/ CXR demonstrating complete right lung whiteout concerning for a hemothorax. Patient was transferred to SICU for further management. He was transfused several products & reversed w/ protamine. A right 28 Fr chest tube was placed w/ a significant amount of sanguineous fluid drained and quality slowly improved to serosanguineous. Unable to obtain ROS as patient is intubated & sedated.    24 HOUR EVENTS:  - Successfully extubated  -   - Adjusted Bactrim from daily to M/W/F  - Diuresis w/ Lasix 20 mg IV x1 dose  - Tacrolimus level 9.8 so decreased from 1 mg -> 0.5 mg PO BID  - Decreased NS from 75 -> 25 mL/hr    NEURO  RASS (if intubated): 		[facility] ICU (if concern for delirium):  Exam:   Meds: acetaminophen     Tablet .. 975 milliGRAM(s) Oral every 6 hours PRN Mild Pain (1 - 3)  levETIRAcetam  IVPB 250 milliGRAM(s) IV Intermittent every 12 hours  valproate sodium  IVPB 750 milliGRAM(s) IV Intermittent every 8 hours      RESPIRATORY  RR: 18 (06-14-22 @ 04:00) (17 - 24)  SpO2: 100% (06-14-22 @ 04:00) (95% - 100%)  Wt(kg): --  Exam:  Mechanical Ventilation: Mode: CPAP with PS, RR (patient): 17, FiO2: 30, PEEP: 5, PS: 5, MAP: 7, PIP: 11  ABG - ( 13 Jun 2022 16:25 )  pH: 7.39  /  pCO2: 27    /  pO2: 137   / HCO3: 16    / Base Excess: -7.6  /  SaO2: 98.8    Lactate: x                Meds:     CARDIOVASCULAR  HR: 74 (06-14-22 @ 04:00) (54 - 79)  BP: 157/77 (06-13-22 @ 20:00) (157/77 - 157/77)  BP(mean): 106 (06-13-22 @ 20:00) (106 - 106)  ABP: 151/41 (06-14-22 @ 04:00) (96/24 - 166/46)  ABP(mean): 81 (06-14-22 @ 04:00) (46 - 89)  Wt(kg): --  CVP(cm H2O): --      Exam:   Cardiac Rhythm:   Perfusion     [ ]Adequate   [ ]Inadequate  Mentation   [ ]Normal       [ ]Reduced  Extremities  [ ]Warm         [ ]Cool  Volume Status [ ]Hypervolemic [ ]Euvolemic [ ]Hypovolemic  Meds:     GI/NUTRITION  Exam:   Diet:   Meds: pantoprazole    Tablet 40 milliGRAM(s) Oral before breakfast  polyethylene glycol 3350 17 Gram(s) Oral daily  senna 2 Tablet(s) Oral at bedtime      GENITOURINARY  I&O's Detail    06-12 @ 07:01 - 06-13 @ 07:00  --------------------------------------------------------  IN:    Dexmedetomidine: 35.4 mL    Enteral Tube Flush: 90 mL    IV PiggyBack: 300 mL    IV PiggyBack: 50 mL    Phenylephrine: 134.1 mL    Propofol: 75.9 mL    sodium chloride 0.9%: 1800 mL  Total IN: 2485.4 mL    OUT:    Chest Tube (mL): 530 mL    Indwelling Catheter - Urethral (mL): 1180 mL  Total OUT: 1710 mL    Total NET: 775.4 mL      06-13 @ 07:01  -  06-14 @ 04:44  --------------------------------------------------------  IN:    IV PiggyBack: 100 mL    IV PiggyBack: 200 mL    Jevity 1.5: 60 mL    PRBCs (Packed Red Blood Cells): 300 mL    Propofol: 111.1 mL    sodium chloride 0.9%: 575 mL  Total IN: 1346.1 mL    OUT:    Chest Tube (mL): 40 mL    Indwelling Catheter - Urethral (mL): 2045 mL  Total OUT: 2085 mL    Total NET: -738.9 mL          06-13    142  |  113<H>  |  60<H>  ----------------------------<  209<H>  4.3   |  16<L>  |  2.09<H>    Ca    8.1<L>      13 Jun 2022 16:32  Phos  3.8     06-13  Mg     2.2     06-13      Meds: magnesium oxide 400 milliGRAM(s) Oral every 8 hours  multivitamin 1 Tablet(s) Oral daily  sodium chloride 0.9%. 1000 milliLiter(s) IV Continuous <Continuous>      HEMATOLOGIC  Meds:                         8.7    10.79 )-----------( 170      ( 13 Jun 2022 16:32 )             25.8     PT/INR - ( 13 Jun 2022 05:59 )   PT: 18.5 sec;   INR: 1.60 ratio         PTT - ( 13 Jun 2022 05:59 )  PTT:32.7 sec    INFECTIOUS DISEASES  T(C): 36.6 (06-14-22 @ 03:00), Max: 37 (06-13-22 @ 07:00)  Wt(kg): --  WBC Count: 10.79 K/uL (06-13 @ 16:32)  WBC Count: 10.35 K/uL (06-13 @ 05:59)    Recent Cultures:  Specimen Source: .Body Fluid Pleural Fluid, 06-11 @ 02:42; Results   No growth; Gram Stain:   Few polymorphonuclear leukocytes seen  No organisms seen; Organism: --  Specimen Source: .Blood Blood, 06-10 @ 18:15; Results   No growth to date.; Gram Stain: --; Organism: --  Specimen Source: .Blood Blood, 06-10 @ 18:00; Results   No growth to date.; Gram Stain: --; Organism: --    Meds: cefepime   IVPB 1000 milliGRAM(s) IV Intermittent every 24 hours  fluconAZOLE   Tablet 200 milliGRAM(s) Oral daily  tacrolimus 0.5 milliGRAM(s) Oral <User Schedule>  valGANciclovir 450 milliGRAM(s) Oral <User Schedule>      ENDOCRINE  Capillary Blood Glucose    Meds: atorvastatin 40 milliGRAM(s) Oral at bedtime  insulin lispro (ADMELOG) corrective regimen sliding scale   SubCutaneous every 4 hours  levothyroxine 50 MICROGram(s) Oral daily  predniSONE   Tablet 5 milliGRAM(s) Oral daily      ACCESS DEVICES:  [ ] Peripheral IV  [ ] Central Venous Line		[ ] R	[ ] L	[ ] IJ	[ ] Fem	[ ] SC	Placed:   [ ] Arterial Line			[ ] R	[ ] L	[ ] Fem	[ ] Rad	[ ] Ax	Placed:   [ ] PICC:					[ ] Mediport  [ ] Urinary Catheter, Date Placed:   [ ] Necessity of urinary, arterial, and venous catheters discussed    OTHER MEDICATIONS:      IMAGING: HISTORY:  65 y/o male w/ a PMHx of CAD s/p CABG (2020), AF on Eliquis c/b CVA (2019) c/b seizures, HTN, HLD, DM type II, hypothyroidism, GI bleed due to a duodenal ulcer (2/2022), and ESRD s/p DDRT (4/21/22) c/b DGF requiring HD (last session 4/29/22) & large perinephric hematoma s/p evacuation w/ revision of arterial anastomosis (5/2/22) who presented on 6/10 for status epilepticus. Patient was intubated for airway protection and transferred to MICU for further management. Patient was loaded w/ valproic acid and continued his home Keppra w/ resolution of his seizures on EEG so he was extubated on 6/11. Patient was noted to have a large right pleural effusion so thoracentesis was performed w/ 1.3 L of serosanguineous fluid drained. Patient was started on a heparin infusion for his atrial fibrillation post-procedure but had a H&H drop w/ CXR demonstrating complete right lung whiteout concerning for a hemothorax. Patient was transferred to SICU for further management. He was intubated for airway protection again, transfused several products, and reversed w/ protamine. A right 28 Fr chest tube was placed w/ a significant amount of sanguineous fluid drained and quality slowly improved to serosanguineous.     24 HOUR EVENTS:  - Successfully extubated  - 1 unit of PRBCs transfused for HCT 22.7 w/ appropriate response to 25.8  - Adjusted Bactrim from daily to M/W/F  - Diuresis w/ Lasix 20 mg IV x1 dose  - Tacrolimus level 9.8 so decreased from 1 mg -> 0.5 mg PO BID  - Decreased NS from 75 -> 25 mL/hr    NEURO  Exam: awake, alert, oriented x4, no acute distress, no acute focal deficits  Meds:  - acetaminophen     Tablet .. 975 milliGRAM(s) Oral every 6 hours PRN Mild Pain (1 - 3)  - levETIRAcetam  IVPB 250 milliGRAM(s) IV Intermittent every 12 hours  - valproate sodium  IVPB 750 milliGRAM(s) IV Intermittent every 8 hours    RESPIRATORY  RR: 18 (06-14-22 @ 04:00) (17 - 24)  SpO2: 100% (06-14-22 @ 04:00) (95% - 100%)  Exam: mild rhonchi in all lung fields    CARDIOVASCULAR  HR: 74 (06-14-22 @ 04:00) (54 - 79)  BP: 157/77 (06-13-22 @ 20:00) (157/77 - 157/77)  BP(mean): 106 (06-13-22 @ 20:00) (106 - 106)  ABP: 151/41 (06-14-22 @ 04:00) (96/24 - 166/46)  ABP(mean): 81 (06-14-22 @ 04:00) (46 - 89)  Exam: regular rate and rhythm, S1S2  Cardiac Rhythm: sinus  Perfusion    [x]Adequate    [ ]Inadequate  Mentation   [ ]Normal       [x]Reduced  Extremities  [x]Warm         [ ]Cool  Volume Status [ ]Hypervolemic [x]Euvolemic [ ]Hypovolemic    GI/NUTRITION  Exam: soft, nontender, nondistended, RLQ incision well-healed  Diet: NPO except medications  Meds:  - pantoprazole    Tablet 40 milliGRAM(s) Oral before breakfast  - polyethylene glycol 3350 17 Gram(s) Oral daily  - senna 2 Tablet(s) Oral at bedtime    GENITOURINARY  I&O's Detail  06-13 @ 07:01  -  06-14 @ 04:44  --------------------------------------------------------  IN:    IV PiggyBack: 100 mL    IV PiggyBack: 200 mL    Jevity 1.5: 60 mL    PRBCs (Packed Red Blood Cells): 300 mL    Propofol: 111.1 mL    sodium chloride 0.9%: 575 mL  Total IN: 1346.1 mL    OUT:    Chest Tube (mL): 40 mL    Indwelling Catheter - Urethral (mL): 2045 mL  Total OUT: 2085 mL    Total NET: -738.9 mL    142  |  113<H>  |  60<H>  ----------------------------<  209<H>  4.3   |  16<L>  |  2.09<H>    Ca    8.1<L>      13 Jun 2022 16:32  Phos  3.8  Mg     2.2    Meds:  - predniSONE   Tablet 5 milliGRAM(s) Oral daily  - sodium chloride 0.9% infuse at 25 mL/hr  - tacrolimus 0.5 milliGRAM(s) Oral every 12 hours    HEMATOLOGIC                        8.7    10.79 )-----------( 170      ( 13 Jun 2022 16:32 )             25.8     PT/INR - ( 13 Jun 2022 05:59 )   PT: 18.5 sec;   INR: 1.60 ratio    PTT - ( 13 Jun 2022 05:59 )  PTT:32.7 sec    INFECTIOUS DISEASES  T(C): 36.6 (06-14-22 @ 03:00), Max: 37 (06-13-22 @ 07:00)  WBC Count:  - 10.79 K/uL (06-13 @ 16:32)  - 10.35 K/uL (06-13 @ 05:59)    Recent Cultures:  - Specimen Source: .Body Fluid Pleural Fluid, 06-11 @ 02:42  Results: No growth  Gram Stain: Few polymorphonuclear leukocytes seen. No organisms seen.  Organism: --  - Specimen Source: .Blood Blood, 06-10 @ 18:15  Results: No growth to date.  Gram Stain: --  Organism: --  - Specimen Source: .Blood Blood, 06-10 @ 18:00  Results: No growth to date.  Gram Stain: --  Organism: --    Meds:  - cefepime   IVPB 1000 milliGRAM(s) IV Intermittent every 24 hours  - fluconAZOLE   Tablet 200 milliGRAM(s) Oral daily  - trimethoprim  80 mG/sulfamethoxazole 400 mG 1 Tablet(s) Oral <User Schedule>  - valGANciclovir 450 milliGRAM(s) Oral <User Schedule>    ENDOCRINE  Capillary Blood Glucose: 174 mg/dL (14 Jun 2022 02:45)  Meds:  - atorvastatin 40 milliGRAM(s) Oral at bedtime  - insulin lispro (ADMELOG) corrective regimen sliding scale   SubCutaneous every 4 hours  - levothyroxine 50 MICROGram(s) Oral daily    ACCESS DEVICES:  [x] Peripheral IV  [ ] Central Venous Line		[ ] R	[ ] L	[ ] IJ	[ ] Fem	[ ] SC	Placed:   [x] Arterial Line			[x] R	[ ] L	[ ] Fem	[x] Rad	[ ] Ax	Placed: 6/11  [ ] PICC:					[ ] Mediport  [x] Urinary Catheter, Date Placed: 6/12  [x] Necessity of urinary, arterial, and venous catheters discussed    OTHER MEDICATIONS:  - chlorhexidine 2% Cloths 1 Application(s) Topical <User Schedule>  - magnesium oxide 400 milliGRAM(s) Oral every 8 hours  - multivitamin 1 Tablet(s) Oral daily    IMAGING: HISTORY:  65 y/o male w/ a PMHx of CAD s/p CABG (2020), AF on Eliquis c/b CVA (2019) c/b seizures, HTN, HLD, DM type II, hypothyroidism, GI bleed due to a duodenal ulcer (2/2022), and ESRD s/p DDRT (4/21/22) c/b DGF requiring HD (last session 4/29/22) & large perinephric hematoma s/p evacuation w/ revision of arterial anastomosis (5/2/22) who presented on 6/10 for status epilepticus. Patient was intubated for airway protection and transferred to MICU for further management. Patient was loaded w/ valproic acid and continued his home Keppra w/ resolution of his seizures on EEG so he was extubated on 6/11. Patient was noted to have a large right pleural effusion so thoracentesis was performed w/ 1.3 L of serosanguineous fluid drained. Patient was started on a heparin infusion for his atrial fibrillation post-procedure but had a H&H drop w/ CXR demonstrating complete right lung whiteout concerning for a hemothorax. Patient was transferred to SICU for further management. He was intubated for airway protection again, transfused several products, and reversed w/ protamine. A right 28 Fr chest tube was placed w/ a significant amount of sanguineous fluid drained and quality slowly improved to serosanguineous.     24 HOUR EVENTS:  - Successfully extubated  - 1 unit of PRBCs transfused for HCT 22.7 w/ appropriate response to 25.8  - Adjusted Bactrim from daily to M/W/F  - Diuresis w/ Lasix 20 mg IV x1 dose  - Tacrolimus level 9.8 so decreased from 1 mg -> 0.5 mg PO BID  - Decreased NS from 75 -> 25 mL/hr    NEURO  Exam: awake, alert, oriented x4, no acute distress, no acute focal deficits  Meds:  - acetaminophen     Tablet .. 975 milliGRAM(s) Oral every 6 hours PRN Mild Pain (1 - 3)  - levETIRAcetam  IVPB 250 milliGRAM(s) IV Intermittent every 12 hours  - valproate sodium  IVPB 750 milliGRAM(s) IV Intermittent every 8 hours    RESPIRATORY  RR: 18 (06-14-22 @ 04:00) (17 - 24)  SpO2: 100% (06-14-22 @ 04:00) (95% - 100%)  Exam: mild rhonchi in all lung fields    CARDIOVASCULAR  HR: 74 (06-14-22 @ 04:00) (54 - 79)  BP: 157/77 (06-13-22 @ 20:00) (157/77 - 157/77)  BP(mean): 106 (06-13-22 @ 20:00) (106 - 106)  ABP: 151/41 (06-14-22 @ 04:00) (96/24 - 166/46)  ABP(mean): 81 (06-14-22 @ 04:00) (46 - 89)  Exam: regular rate and rhythm, S1S2  Cardiac Rhythm: sinus  Perfusion    [x]Adequate    [ ]Inadequate  Mentation   [x]Normal       [ ]Reduced  Extremities  [x]Warm         [ ]Cool  Volume Status [ ]Hypervolemic [x]Euvolemic [ ]Hypovolemic    GI/NUTRITION  Exam: soft, nontender, nondistended, RLQ incision well-healed  Diet: NPO except medications  Meds:  - pantoprazole    Tablet 40 milliGRAM(s) Oral before breakfast  - polyethylene glycol 3350 17 Gram(s) Oral daily  - senna 2 Tablet(s) Oral at bedtime    GENITOURINARY  I&O's Detail  06-13 @ 07:01  -  06-14 @ 04:44  --------------------------------------------------------  IN:    IV PiggyBack: 100 mL    IV PiggyBack: 200 mL    Jevity 1.5: 60 mL    PRBCs (Packed Red Blood Cells): 300 mL    Propofol: 111.1 mL    sodium chloride 0.9%: 575 mL  Total IN: 1346.1 mL    OUT:    Chest Tube (mL): 40 mL    Indwelling Catheter - Urethral (mL): 2045 mL  Total OUT: 2085 mL    Total NET: -738.9 mL    142  |  113<H>  |  60<H>  ----------------------------<  209<H>  4.3   |  16<L>  |  2.09<H>    Ca    8.1<L>      13 Jun 2022 16:32  Phos  3.8  Mg     2.2    Meds:  - predniSONE   Tablet 5 milliGRAM(s) Oral daily  - sodium chloride 0.9% infuse at 25 mL/hr  - tacrolimus 0.5 milliGRAM(s) Oral every 12 hours    HEMATOLOGIC                        8.7    10.79 )-----------( 170      ( 13 Jun 2022 16:32 )             25.8     PT/INR - ( 13 Jun 2022 05:59 )   PT: 18.5 sec;   INR: 1.60 ratio    PTT - ( 13 Jun 2022 05:59 )  PTT:32.7 sec    INFECTIOUS DISEASES  T(C): 36.6 (06-14-22 @ 03:00), Max: 37 (06-13-22 @ 07:00)  WBC Count:  - 10.79 K/uL (06-13 @ 16:32)  - 10.35 K/uL (06-13 @ 05:59)    Recent Cultures:  - Specimen Source: .Body Fluid Pleural Fluid, 06-11 @ 02:42  Results: No growth  Gram Stain: Few polymorphonuclear leukocytes seen. No organisms seen.  Organism: --  - Specimen Source: .Blood Blood, 06-10 @ 18:15  Results: No growth to date.  Gram Stain: --  Organism: --  - Specimen Source: .Blood Blood, 06-10 @ 18:00  Results: No growth to date.  Gram Stain: --  Organism: --    Meds:  - cefepime   IVPB 1000 milliGRAM(s) IV Intermittent every 24 hours  - fluconAZOLE   Tablet 200 milliGRAM(s) Oral daily  - trimethoprim  80 mG/sulfamethoxazole 400 mG 1 Tablet(s) Oral <User Schedule>  - valGANciclovir 450 milliGRAM(s) Oral <User Schedule>    ENDOCRINE  Capillary Blood Glucose: 174 mg/dL (14 Jun 2022 02:45)  Meds:  - atorvastatin 40 milliGRAM(s) Oral at bedtime  - insulin lispro (ADMELOG) corrective regimen sliding scale   SubCutaneous every 4 hours  - levothyroxine 50 MICROGram(s) Oral daily    ACCESS DEVICES:  [x] Peripheral IV  [ ] Central Venous Line		[ ] R	[ ] L	[ ] IJ	[ ] Fem	[ ] SC	Placed:   [x] Arterial Line			[x] R	[ ] L	[ ] Fem	[x] Rad	[ ] Ax	Placed: 6/11  [ ] PICC:					[ ] Mediport  [x] Urinary Catheter, Date Placed: 6/12  [x] Necessity of urinary, arterial, and venous catheters discussed    OTHER MEDICATIONS:  - chlorhexidine 2% Cloths 1 Application(s) Topical <User Schedule>  - magnesium oxide 400 milliGRAM(s) Oral every 8 hours  - multivitamin 1 Tablet(s) Oral daily    IMAGING:

## 2022-06-14 NOTE — PROGRESS NOTE ADULT - ASSESSMENT
66 yr old man with h/o DM type II, HTN, CAD s/p CABG in 2020, Afib on Eliquis, CVA in 2019 due to Afib, Seizure d/o (last episode was on 4/8/22), h/o GI bleed in 2/2022 EGD with duodenal ulcer and ESRD on HD s/p DDRT from [facility] donor on 4/21/22 complicated by DGF requiring HD until 4/29/22 now with downtrending creatinine who presented with status epilepticus in setting of UTI/antibiotics and sub-therapeutic valproic acid level     Seizure  - status epilepticus in setting of UTI/antibiotics and sub-therapeutic valproic acid level   - No seizure activity recorded on EEG.  EEG disconnected  - On Keppra and Valproic acid, adjusted by Neuro  - Appreciate Neurology recommendations    Hypoxemis resp failure:  - extubated yesterday on AFM  - Thoracic  plan for VATS tomorrow 6/15   - H&H stable 8.8/26.8 post one u PRBC yesterday (hemorrhagic shock S/P 5 PRBC, 2 FFP and Protamine for reversal of Heparin)    DCD DDRT 4/21/22  - Weaned off Phenylephrine keep MAP >65  - Creatinine improving,  started on D5 by ICU team  - Strict I/Os    - Immunosuppression:  - Prograf 0.5mg suspension BID will change to cap in am. resume MMF 500mg bid. Continue prednisone 5mg daily.   - PPX: Bactrim 3 x w, Valcyte (MWF), Fluconazole 200mg daily (on fluconazole after repair of arterial anastomosis)  - Q8 labs, Daily Tacrolimus level daily in AM prior to dose    Klebsiella UTI  - Continue Cefepime  - Blood Cx  6/10 ngtd  - ID consult    DM  -SSI coverage

## 2022-06-14 NOTE — OCCUPATIONAL THERAPY INITIAL EVALUATION ADULT - ANTICIPATED DISCHARGE DISPOSITION, OT EVAL
Return to subacute rehab. IF pt goes home, will require assist for all ADL/mobility x2 person assist & home OT to address ADL/IADL, AE/DME, and fall prevention

## 2022-06-14 NOTE — CONSULT NOTE ADULT - ASSESSMENT
66 year old Male with PMHx ESRD s/p permacath removal 5/10/22 s/p Rt DDRT 4/21/22,  CVA (2019), Afib on apixaban, seizure activity, CAD s/p stents, CABG (2020), HTN, HLD, DM on insulin, gastric/duodenal ulcer, recent hospitalization 4/28/22 -5/10/22 with weakness/anemia found to have perinephric hematoma requiring evacuation and repair of bleeding arterial anastomosis who presented to [facility] for status epilepticus requiring intubation for hypoxic respiratory failure.    At [facility] was intubated on 6/10  On 6/11 underwent thoracentesis in context of Eliquis therapy  Developed bleeding into pleural space and require Chest tube placement  Planned for VATS    COVID19/FLU/RSV PCR (6/10) Negative  U/A (6/10) with 5 WBC and 25 RBC  Blood Cultures (6/10) NGTD  Pleural Fluid with 71 nucleated cells, 50% PMN, 31% Lymph.   Pleural Fluid Culture (6/11) Negative   CT Chest (6/12) with Right-sided chest tube with large hemothorax and atelectasis of the right lower lobe.    #Positive Urine Culture (?UTI), Leukocytosis  Of note, was being treated for UTI at rehab  UCx from 6/1 with >100K ESBL Klebsiella (Cipro R but Levofloxacin S)  He received 1-2 doses of Ertapenem and was then switched to Levofloxacin  Doubt the Levofloxacin was effective therapy given Cipro resistance  Unclear if positive UCx represented UTI but reasonable to at least give short course of effective/reliable therapy  Unable to send repeat UCx in context of indwelling tucker  --Stop Cefepime  --Recommend Ertapenem 1g IV Q24H  --Continue to follow CBC with diff  ----Continue to follow temperature curve  --Follow up on preliminary blood cultures    #Renal Transplant Recipient, Prophylactic Antibiotic  --Continue Bactrim for PCP PPx  --Continue Valcyte for CMV PPx    I will continue to follow. Please feel free to contact me with any further questions.    Carlton Hoover M.D.  [facility] Division of Infectious Disease  8AM-5PM Monday - Friday: Available on [employer] Teams  After Hours and Holidays (or if no response on [employer] Teams): Please contact the Infectious Diseases Office at [phone]    The above assessment and plan were discussed with 8ICU Team and Temi, transplant surgery NP

## 2022-06-15 NOTE — PROGRESS NOTE ADULT - SUBJECTIVE AND OBJECTIVE BOX
[facility] DIVISION OF KIDNEY DISEASES AND HYPERTENSION -- FOLLOW UP NOTE  --------------------------------------------------------------------------------  HPI:  66 yr old man with ESRD due to DM was on HD since 2019, s/p DCD DDRT on 4/21/22. Donor was 58 , KDPI 82%, DCD, single vessels and ureter, HLA mismatch 1, 2, 2. No DSA, cPRA 0%. 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 Readmitted in May for anemia in the setting of large perinephric hematoma s/p evacuation and repair of arterial anastomosis on 5/2/22. Intra operative biopsy showed no rejection, Creatinine ranging 2mg/dL. He was recently dx with klebsiella UTI rx with ertapenem - then switched to Levaquin day #6. He also had parainfluenza pneumonia. Was at rehab progressing well. Presented with status epilepticus. Intubated for respiratory protection. Right pleural effusion drained 1300ml. Transfused 1 unit PRBC. Pt. transferred to SICU and was transfused 4 additional units.     Pt. seen this AM in SICU. Pt. for VATs today. Pt. denies any acute complaints, minimally verbal.     PAST HISTORY  --------------------------------------------------------------------------------  No significant changes to PMH, PSH, FHx, SHx, unless otherwise noted    ALLERGIES & MEDICATIONS  --------------------------------------------------------------------------------  Allergies    No Known Allergies    Intolerances      Standing Inpatient Medications  atorvastatin 40 milliGRAM(s) Oral at bedtime  chlorhexidine 2% Cloths 1 Application(s) Topical <User Schedule>  ertapenem  IVPB 1000 milliGRAM(s) IV Intermittent every 24 hours  fluconAZOLE   Tablet 200 milliGRAM(s) Oral daily  insulin glargine Injectable (LANTUS) 10 Unit(s) SubCutaneous at bedtime  insulin lispro (ADMELOG) corrective regimen sliding scale   SubCutaneous three times a day before meals  levothyroxine 50 MICROGram(s) Oral daily  magnesium oxide 400 milliGRAM(s) Oral every 8 hours  multivitamin 1 Tablet(s) Oral daily  mycophenolate mofetil 500 milliGRAM(s) Oral <User Schedule>  pantoprazole    Tablet 40 milliGRAM(s) Oral before breakfast  polyethylene glycol 3350 17 Gram(s) Oral daily  predniSONE   Tablet 5 milliGRAM(s) Oral daily  senna 2 Tablet(s) Oral at bedtime  sodium chloride 0.45%. 1000 milliLiter(s) IV Continuous <Continuous>  tacrolimus 0.5 milliGRAM(s) Oral <User Schedule>  trimethoprim   80 mG/sulfamethoxazole 400 mG 1 Tablet(s) Oral <User Schedule>  valproate sodium  IVPB 750 milliGRAM(s) IV Intermittent every 8 hours    PRN Inpatient Medications  acetaminophen     Tablet .. 975 milliGRAM(s) Oral every 6 hours PRN      REVIEW OF SYSTEMS  --------------------------------------------------------------------------------  Unable to obtain    VITALS/PHYSICAL EXAM  --------------------------------------------------------------------------------  T(C): 36.4 (06-15-22 @ 11:00), Max: 37.2 (06-15-22 @ 03:00)  HR: 62 (06-15-22 @ 13:00) (59 - 78)  BP: 140/66 (06-14-22 @ 20:00) (140/66 - 150/67)  RR: 18 (06-15-22 @ 13:00) (12 - 32)  SpO2: 100% (06-15-22 @ 13:00) (93% - 100%)  Wt(kg): --  Height (cm): 177.8 (06-15-22 @ 07:24)  Weight (kg): 61.7 (06-15-22 @ 07:24)  BMI (kg/m2): 19.5 (06-15-22 @ 07:24)  BSA (m2): 1.77 (06-15-22 @ 07:24)      06-14-22 @ 07:01  -  06-15-22 @ 07:00  --------------------------------------------------------  IN: 2385 mL / OUT: 2180 mL / NET: 205 mL    06-15-22 @ 07:01  -  06-15-22 @ 14:16  --------------------------------------------------------  IN: 150 mL / OUT: 355 mL / NET: -205 mL      Physical Exam:  	Gen: Awake, sitting up on RA  	HEENT: PERRL, MMM   	Pulm: CTAB anteriorly  	CV: S1S2+  	Abd: +BS, soft, non-tender          Transplant: No tenderness, swelling  	: No suprapubic tenderness  	MSK: no edema   	Psych: normal affect   	Skin: Warm          Access: CVC catheter    LABS/STUDIES  --------------------------------------------------------------------------------              8.8    6.96  >-----------<  190      [06-15-22 @ 11:22]              27.5     146  |  117  |  43  ----------------------------<  165      [06-15-22 @ 11:22]  5.1   |  20  |  1.83        Ca     8.5     [06-15-22 @ 11:22]      Mg     2.4     [06-15-22 @ 11:22]      Phos  4.2     [06-15-22 @ 11:22]    TPro  5.5  /  Alb  2.1  /  TBili  0.2  /  DBili  x   /  AST  20  /  ALT  14  /  AlkPhos  82  [06-15-22 @ 11:22]    PT/INR: PT 18.2 , INR 1.58       [06-15-22 @ 00:23]  PTT: 33.0       [06-15-22 @ 00:23]      Creatinine Trend:  SCr 1.83 [06-15 @ 11:22]  SCr 1.98 [06-15 @ 00:22]  SCr 2.04 [06-14 @ 06:03]  SCr 2.09 [06-13 @ 16:32]  SCr 2.23 [06-13 @ 05:59]    Tacrolimus (), Serum: 7.5 ng/mL (06-15 @ 05:39)  Tacrolimus (), Serum: 8.7 ng/mL (06-14 @ 06:03)  Tacrolimus (), Serum: 9.8 ng/mL (06-13 @ 05:59)  Tacrolimus (), Serum: 3.6 ng/mL (06-11 @ 06:44)    Urinalysis - [06-10-22 @ 22:28]      Color Light Yellow / Appearance Clear / SG 1.016 / pH 6.0      Gluc 100 mg/dL / Ketone Negative  / Bili Negative / Urobili Negative       Blood Small / Protein Trace / Leuk Est Negative / Nitrite Negative      RBC 25 / WBC 5 / Hyaline  / Gran  / Sq Epi  / Non Sq Epi 1 / Bacteria Negative    Urine Creatinine 38      [06-11-22 @ 09:32]  Urine Protein 18      [06-11-22 @ 09:32]  Urine Sodium 50      [06-11-22 @ 09:32]  Urine Potassium 10      [06-11-22 @ 09:32]  Urine Chloride <20      [06-11-22 @ 09:32]    Iron 17, TIBC 171, %sat 10      [05-02-22 @ 13:03]  Ferritin 1505      [05-02-22 @ 13:05]  PTH -- (Ca 9.2)      [02-05-22 @ 10:13]   294  HbA1c 6.0      [02-21-20 @ 08:53]  Lipid: chol 118, TG 54, HDL 59, LDL --      [06-27-21 @ 09:44]

## 2022-06-15 NOTE — PROGRESS NOTE ADULT - PROBLEM SELECTOR PLAN 1
S/p DDRT on 4/21/22 Cr. stable in 2 range. ANA in the setting of hemodynamic instability 2/2 ATN. Donor was 58 , KDPI 82%, DCD, single vessels and ureter, HLA mismatch 1, 2, 2. No DSA, cPRA 0%. 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 Readmitted in May for anemia in the setting of large perinephric hematoma s/p evacuation and repair of arterial anastomosis on 5/2/22. Intra operative biopsy showed no rejection. As outpatient SCr. in th low 2 range. Admitted with lowest documented Cr. of 1.8 since transplant which has trended to 1.8 today. Switched cefepime to Ertapenem for UTI because it is less likely to induce seizures, follow cultures and sensitivities. Optimize hemodynamics. Adequate UOP.

## 2022-06-15 NOTE — PROVIDER CONTACT NOTE (CHANGE IN STATUS NOTIFICATION) - ASSESSMENT
Pt AxO2 (oriented to person and time). Pupils 4+ round and brisk. Pt moderate strength on upper and lower extremities, following commands. Pt c/o pain, unable to describe pain level. Repeating my sentences and seems unable to express needs. Pt AxO2 (oriented to person and time). Pupils 4+ round and brisk. Pt moderate strength on upper and lower extremities, following commands. Pt c/o pain, unable to describe pain level. Repeating my sentences and seems unable to express needs. Vitals stable, HR 62, /48, O2 sat 99, RR 13. No acute signs of distress.

## 2022-06-15 NOTE — PROGRESS NOTE ADULT - NUTRITIONAL ASSESSMENT
This patient has been assessed with a concern for Malnutrition and has been determined to have a diagnosis/diagnoses of Severe protein-calorie malnutrition.    This patient is being managed with:   Diet Regular-  Consistent Carbohydrate {Evening Snack} (CSTCHOSN)  Soft and Bite Sized (SOFTBTSZ)  Mildly Thick Liquids (MILDTHICKLIQS)  Supplement Feeding Modality:  Oral  Ensure Pudding Cans or Servings Per Day:  1       Frequency:  Three Times a day  Entered: Jun 14 2022  8:47PM    Diet NPO after Midnight-     NPO Start Date: 14-Jun-2022   NPO Start Time: 23:59  Entered: Jun 14 2022  2:06PM

## 2022-06-15 NOTE — PROGRESS NOTE ADULT - SUBJECTIVE AND OBJECTIVE BOX
Transplant Surgery - Consult Note  --------------------------------------------------------------  DCD DDRT     Date: 4/21/22    Present:   Patient seen and examined with multidisciplinary team including Transplant Surgeon: Dr. David, Transplant Nephrologist: Dr. Kindra PRINCE,  nephrology fellow Dr. Pantoja, transplant pharmacist VENICE Lim, NP Jose and unit RN during am rounds.  Disciplines not in attendance will be notified of the plan.     HPI: 66 yr old man with PMH: DM type II, HTN, CAD s/p CABG in 2020, Afib on Eliquis, CVA in 2019 due to Afib, Seizure d/o following CVA last episode was on 4/8/22, h/o GI bleed in 2/2022 EGD with duodenal ulcer.  ESRD due to DM was on HD since 2019, s/p DCD DDRT on 4/21/22.  Donor was 58 , KDPI 82%, DCD, single vessels and ureter, HLA mismatch 1, 2, 2. No DSA, cPRA 0%. 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 Was Readmitted in May for anemia in the setting of large perinephric hematoma s/p evacuation and repair of arterial anastomosis on 5/2/22. Intra operative biopsy showed no rejection, Creatinine ranging ~2mg/dL.  In Rehab: He was recently dx with klebsiella UTI rx with ertapenem - then switched to Levaquin day #6.    He also had parainfluenza pneumonia. Was at rehab progressing well.      Hospital course:  - 6/10: transferred from rehab facility for seizure status epilepticus. Intubated for respiratory protection and transferred to MICU. EEG done no seizure activity recorded.  Neuro following/adjusting meds  - 6/11: Extubated. Found to have pleural effusion on eliquis Underwent Thoracentesis 1.3L. eliquis changed to heparin drip, post procedure drop in H&H. 5u PRBC, 2 u FFP.    - 6/11 evening Transferred to SICU for further care in setting of hypoxia, significant R pleural effusion, hgb 6 and hemodynamic instability  - 6/11 Intubated at 9pm and sedated on Precedex.  CT placed, 600ml blood drained.  1L total overnight, started pressors  - 6/11 Received Protamine for PTT >100 (was on Heparin drip started for AF), 2 u FFP and 5u PRBC total    Interval Events:  - VATS today with Thoracic  - MMF 500mg BID resumed  - Cefepime changed to Ertapenem  - VSS/Afebrile       Immunosuppression:   Induction:  Thymoglobulin                                        Maintenance immunosuppression: Tacrolimus 0.5mg BID, MMF 500mg BID, Pred 5  Ongoing monitoring for signs of rejection.    Potential Discharge date: pending clinical improvement    Education:  Medications    Plan of care:  See Below        MEDICATIONS  (STANDING):  atorvastatin 40 milliGRAM(s) Oral at bedtime  chlorhexidine 2% Cloths 1 Application(s) Topical <User Schedule>  ertapenem  IVPB 1000 milliGRAM(s) IV Intermittent every 24 hours  fluconAZOLE   Tablet 200 milliGRAM(s) Oral daily  insulin glargine Injectable (LANTUS) 10 Unit(s) SubCutaneous at bedtime  insulin lispro (ADMELOG) corrective regimen sliding scale   SubCutaneous three times a day before meals  levothyroxine 50 MICROGram(s) Oral daily  magnesium oxide 400 milliGRAM(s) Oral every 8 hours  multivitamin 1 Tablet(s) Oral daily  mycophenolate mofetil 500 milliGRAM(s) Oral <User Schedule>  pantoprazole    Tablet 40 milliGRAM(s) Oral before breakfast  polyethylene glycol 3350 17 Gram(s) Oral daily  predniSONE   Tablet 5 milliGRAM(s) Oral daily  senna 2 Tablet(s) Oral at bedtime  sodium chloride 0.45%. 1000 milliLiter(s) (50 mL/Hr) IV Continuous <Continuous>  tacrolimus 0.5 milliGRAM(s) Oral <User Schedule>  trimethoprim   80 mG/sulfamethoxazole 400 mG 1 Tablet(s) Oral <User Schedule>  valproate sodium  IVPB 750 milliGRAM(s) IV Intermittent every 8 hours    MEDICATIONS  (PRN):  acetaminophen     Tablet .. 975 milliGRAM(s) Oral every 6 hours PRN Mild Pain (1 - 3)      PAST MEDICAL & SURGICAL HISTORY:  Hypertension      Diabetes      Dyslipidemia      CAD (Coronary Artery Disease)  with Stents in 06/2009 and 6/2019, s/p off-pump C3L on 8/13/20      Hypothyroidism      CVA (cerebral vascular accident)  12/13/19 with residual bilateral weakness      Anemia      PEG (percutaneous endoscopic gastrostomy) status  removed July 2020      Intubation of airway performed without difficulty  Dec 2019  CVA c/b status epilepticus requiring intubation and PEG in 12/2019-      ESRD on dialysis  M/W/F      Seizures  after CVA, last seizure was last week 4/07/22      History of insertion of stent into coronary artery bypass graft  2009 and 2019      S/P CABG x 3  off pump C3L on 8/13/20          Vital Signs Last 24 Hrs  T(C): 36.4 (15 Raffi 2022 11:00), Max: 37.2 (15 Raffi 2022 03:00)  T(F): 97.6 (15 Raffi 2022 11:00), Max: 99 (15 Raffi 2022 03:00)  HR: 66 (15 Raffi 2022 14:00) (59 - 78)  BP: 140/66 (14 Jun 2022 20:00) (140/66 - 150/67)  BP(mean): 95 (14 Jun 2022 20:00) (95 - 96)  RR: 19 (15 Raffi 2022 14:00) (12 - 32)  SpO2: 100% (15 Raffi 2022 14:00) (93% - 100%)    I&O's Summary    14 Jun 2022 07:01  -  15 Raffi 2022 07:00  --------------------------------------------------------  IN: 2385 mL / OUT: 2180 mL / NET: 205 mL    15 Raffi 2022 07:01  -  15 Raffi 2022 14:56  --------------------------------------------------------  IN: 200 mL / OUT: 355 mL / NET: -155 mL                              8.8    6.96  )-----------( 190      ( 15 Raffi 2022 11:22 )             27.5     06-15    146<H>  |  117<H>  |  43<H>  ----------------------------<  165<H>  5.1   |  20<L>  |  1.83<H>    Ca    8.5      15 Raffi 2022 11:22  Phos  4.2     06-15  Mg     2.4     06-15    TPro  5.5<L>  /  Alb  2.1<L>  /  TBili  0.2  /  DBili  x   /  AST  20  /  ALT  14  /  AlkPhos  82  06-15    Tacrolimus (), Serum: 7.5 ng/mL (06-15 @ 05:39)        Culture - Fungal, Body Fluid (collected 06-11-22 @ 02:42)  Source: .Body Fluid Pleural Fluid  Preliminary Report (06-13-22 @ 10:21):    Testing in progress    Culture - Body Fluid with Gram Stain (collected 06-11-22 @ 02:42)  Source: .Body Fluid Pleural Fluid  Gram Stain (06-11-22 @ 12:29):    Few polymorphonuclear leukocytes seen    No organisms seen  Preliminary Report (06-12-22 @ 07:20):    No growth    Culture - Blood (collected 06-10-22 @ 18:15)  Source: .Blood Blood  Preliminary Report (06-12-22 @ 01:02):    No growth to date.    Culture - Blood (collected 06-10-22 @ 18:00)  Source: .Blood Blood  Preliminary Report (06-12-22 @ 01:02):    No growth to date.         REVIEW OF SYSTEMS  --------------------------------------------------------------------------------  Gen: No weight changes, fatigue, fevers/chills, weakness  Skin: No rashes  Head/Eyes/Ears/Mouth: No headache; Normal hearing; Normal vision w/o blurriness; No sinus pain/discomfort, sore throat  Respiratory: No dyspnea, cough, wheezing, hemoptysis  CV: No chest pain, PND, orthopnea  GI: No abdominal pain, diarrhea, constipation, nausea, vomiting, melena, hematochezia  : No increased frequency, dysuria, hematuria, nocturia  MSK: No joint pain/swelling; no back pain; no edema  Neuro: No dizziness/lightheadedness, weakness, seizures, numbness, tingling  Heme: No easy bruising or bleeding  Endo: No heat/cold intolerance  Psych: No significant nervousness, anxiety, stress, depression  All other systems were reviewed and are negative, except as noted.        PHYSICAL EXAM:  Constitutional: Well developed / well nourished  Eyes: Anicteric, PERRLA  ENMT: nc/at  Neck: supple  Respiratory: CTA anterior. R CT to sxn with bloody drainage  Cardiovascular: RRR  Gastrointestinal: Soft, non distended, NT, incision healed   Genitourinary: tucker  Extremities: SCD's in place and working bilaterally, no edema  Vascular: Palpable dp pulses bilaterally  Neurological: A&O x3  Skin: no rashes, ulcerations or lesions  Musculoskeletal: Moving all extremities  Psychiatric: Responsive   Transplant Surgery - Consult Note  --------------------------------------------------------------  DCD DDRT     Date: 4/21/22    Present:   Patient seen and examined with multidisciplinary team including Transplant Surgeon: Dr. David, Transplant Nephrologist: Dr. Kindra PRINCE,  nephrology fellow Dr. Pantoja, transplant pharmacist VENICE Lim, NP Jose and unit RN during am rounds.  Disciplines not in attendance will be notified of the plan.     HPI: 66 yr old man with PMH: DM type II, HTN, CAD s/p CABG in 2020, Afib on Eliquis, CVA in 2019 due to Afib, Seizure d/o following CVA last episode was on 4/8/22, h/o GI bleed in 2/2022 EGD with duodenal ulcer.  ESRD due to DM was on HD since 2019, s/p DCD DDRT on 4/21/22.  Donor was 58 , KDPI 82%, DCD, single vessels and ureter, HLA mismatch 1, 2, 2. No DSA, cPRA 0%. 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 Was Readmitted in May for anemia in the setting of large perinephric hematoma s/p evacuation and repair of arterial anastomosis on 5/2/22. Intra operative biopsy showed no rejection, Creatinine ranging ~2mg/dL.  In Rehab: He was recently dx with klebsiella UTI rx with ertapenem - then switched to Levaquin day #6.    He also had parainfluenza pneumonia. Was at rehab progressing well.      Hospital course:  - 6/10: transferred from rehab facility for seizure status epilepticus. Intubated for respiratory protection and transferred to MICU. EEG done no seizure activity recorded.  Neuro following/adjusting meds  - 6/11: Extubated. Found to have pleural effusion on eliquis Underwent Thoracentesis 1.3L. eliquis changed to heparin drip, post procedure drop in H&H. 5u PRBC, 2 u FFP.    - 6/11 evening Transferred to SICU for further care in setting of hypoxia, significant R pleural effusion, hgb 6 and hemodynamic instability  - 6/11 Intubated at 9pm and sedated on Precedex.  CT placed, 600ml blood drained.  1L total overnight, started pressors  - 6/11 Received Protamine for PTT >100 (was on Heparin drip started for AF), 2 u FFP and 5u PRBC total    Interval Events:  - VATS today with Thoracic  - MMF 500mg BID resumed  - Cefepime changed to Ertapenem  - VSS/Afebrile       Immunosuppression:   Induction:  Thymoglobulin                                        Maintenance immunosuppression: Tacrolimus 0.5mg BID, MMF 500mg BID, Pred 5  Ongoing monitoring for signs of rejection.    Potential Discharge date: pending clinical improvement    Education:  Medications    Plan of care:  See Below        MEDICATIONS  (STANDING):  atorvastatin 40 milliGRAM(s) Oral at bedtime  chlorhexidine 2% Cloths 1 Application(s) Topical <User Schedule>  ertapenem  IVPB 1000 milliGRAM(s) IV Intermittent every 24 hours  fluconAZOLE   Tablet 200 milliGRAM(s) Oral daily  insulin glargine Injectable (LANTUS) 10 Unit(s) SubCutaneous at bedtime  insulin lispro (ADMELOG) corrective regimen sliding scale   SubCutaneous three times a day before meals  levothyroxine 50 MICROGram(s) Oral daily  magnesium oxide 400 milliGRAM(s) Oral every 8 hours  multivitamin 1 Tablet(s) Oral daily  mycophenolate mofetil 500 milliGRAM(s) Oral <User Schedule>  pantoprazole    Tablet 40 milliGRAM(s) Oral before breakfast  polyethylene glycol 3350 17 Gram(s) Oral daily  predniSONE   Tablet 5 milliGRAM(s) Oral daily  senna 2 Tablet(s) Oral at bedtime  sodium chloride 0.45%. 1000 milliLiter(s) (50 mL/Hr) IV Continuous <Continuous>  tacrolimus 0.5 milliGRAM(s) Oral <User Schedule>  trimethoprim   80 mG/sulfamethoxazole 400 mG 1 Tablet(s) Oral <User Schedule>  valproate sodium  IVPB 750 milliGRAM(s) IV Intermittent every 8 hours    MEDICATIONS  (PRN):  acetaminophen     Tablet .. 975 milliGRAM(s) Oral every 6 hours PRN Mild Pain (1 - 3)      PAST MEDICAL & SURGICAL HISTORY:  Hypertension      Diabetes      Dyslipidemia      CAD (Coronary Artery Disease)  with Stents in 06/2009 and 6/2019, s/p off-pump C3L on 8/13/20      Hypothyroidism      CVA (cerebral vascular accident)  12/13/19 with residual bilateral weakness      Anemia      PEG (percutaneous endoscopic gastrostomy) status  removed July 2020      Intubation of airway performed without difficulty  Dec 2019  CVA c/b status epilepticus requiring intubation and PEG in 12/2019-      ESRD on dialysis  M/W/F      Seizures  after CVA, last seizure was last week 4/07/22      History of insertion of stent into coronary artery bypass graft  2009 and 2019      S/P CABG x 3  off pump C3L on 8/13/20          Vital Signs Last 24 Hrs  T(C): 36.4 (15 Raffi 2022 11:00), Max: 37.2 (15 Raffi 2022 03:00)  T(F): 97.6 (15 Raffi 2022 11:00), Max: 99 (15 Raffi 2022 03:00)  HR: 66 (15 Raffi 2022 14:00) (59 - 78)  BP: 140/66 (14 Jun 2022 20:00) (140/66 - 150/67)  BP(mean): 95 (14 Jun 2022 20:00) (95 - 96)  RR: 19 (15 Raffi 2022 14:00) (12 - 32)  SpO2: 100% (15 Raffi 2022 14:00) (93% - 100%)    I&O's Summary    14 Jun 2022 07:01  -  15 Raffi 2022 07:00  --------------------------------------------------------  IN: 2385 mL / OUT: 2180 mL / NET: 205 mL    15 Raffi 2022 07:01  -  15 Raffi 2022 14:56  --------------------------------------------------------  IN: 200 mL / OUT: 355 mL / NET: -155 mL                              8.8    6.96  )-----------( 190      ( 15 Raffi 2022 11:22 )             27.5     06-15    146<H>  |  117<H>  |  43<H>  ----------------------------<  165<H>  5.1   |  20<L>  |  1.83<H>    Ca    8.5      15 Raffi 2022 11:22  Phos  4.2     06-15  Mg     2.4     06-15    TPro  5.5<L>  /  Alb  2.1<L>  /  TBili  0.2  /  DBili  x   /  AST  20  /  ALT  14  /  AlkPhos  82  06-15    Tacrolimus (), Serum: 7.5 ng/mL (06-15 @ 05:39)        Culture - Fungal, Body Fluid (collected 06-11-22 @ 02:42)  Source: .Body Fluid Pleural Fluid  Preliminary Report (06-13-22 @ 10:21):    Testing in progress    Culture - Body Fluid with Gram Stain (collected 06-11-22 @ 02:42)  Source: .Body Fluid Pleural Fluid  Gram Stain (06-11-22 @ 12:29):    Few polymorphonuclear leukocytes seen    No organisms seen  Preliminary Report (06-12-22 @ 07:20):    No growth    Culture - Blood (collected 06-10-22 @ 18:15)  Source: .Blood Blood  Preliminary Report (06-12-22 @ 01:02):    No growth to date.    Culture - Blood (collected 06-10-22 @ 18:00)  Source: .Blood Blood  Preliminary Report (06-12-22 @ 01:02):    No growth to date.         REVIEW OF SYSTEMS  --------------------------------------------------------------------------------  Gen: No weight changes, fatigue, fevers/chills, weakness  Skin: No rashes  Head/Eyes/Ears/Mouth: No headache; Normal hearing; Normal vision w/o blurriness; No sinus pain/discomfort, sore throat  Respiratory: No dyspnea, cough, wheezing, hemoptysis  CV: No chest pain, PND, orthopnea  GI: No abdominal pain, diarrhea, constipation, nausea, vomiting, melena, hematochezia  : No increased frequency, dysuria, hematuria, nocturia  MSK: No joint pain/swelling; no back pain; no edema  Neuro: No dizziness/lightheadedness, weakness, seizures, numbness, tingling  Heme: No easy bruising or bleeding  Endo: No heat/cold intolerance  Psych: No significant nervousness, anxiety, stress, depression  All other systems were reviewed and are negative, except as noted.        PHYSICAL EXAM:  Constitutional: Well developed / well nourished  Eyes: Anicteric, PERRLA  ENMT: nc/at  Neck: supple  Respiratory: CTA anterior. R CT x2 to sxn with bloody drainage  Cardiovascular: RRR  Gastrointestinal: Soft, non distended, NT, incision healed   Genitourinary: tucker  Extremities: SCD's in place and working bilaterally, no edema  Vascular: Palpable dp pulses bilaterally  Neurological: A&O x3  Skin: no rashes, ulcerations or lesions  Musculoskeletal: Moving all extremities  Psychiatric: Responsive

## 2022-06-15 NOTE — PROGRESS NOTE ADULT - ASSESSMENT
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  - Assess neurological status every 4 hours in the setting of seizures  - Keppra and valproic acid for seizures  - Appreciate neurology recommendations  - Pain control if needed w/ acetaminophen    Resp: intubated for airway protection, large right pleural effusion s/p thoracentesis c/b hemothorax while being anticoagulated s/p chest tube placement  - Out of bed to chair, incentive spirometry, and aggressive chest CT to prevent atelectasis  - Right chest tube to suction  - CT chest on 6/12 w/ retained hemothorax so plan for VATS today    CV: CAD, AF  - Monitor vital signs    GI: no acute issues  - Regular diet as tolerated but NPO today for VATS  - Bowel regimen with senna & Miralax  - Protonix for stress ulcer prophylaxis    Renal: s/p DDRT  - 1/2NS at 50 mL/hr while NPO  - Tacrolimus, MMF, and steroids for immunosuppression in the setting of DDRT    Heme: no acute issues  - Monitor CBC and coags  - Venodynes for mechanical VTE prophylaxis; holding chemical VTE prophylaxis in the setting of recent hemothorax    ID: UTI, immunosuppression  - Monitor WBC, temperature, and procalcitonin  - Empiric antibiotics w/ ertapenem for UTI as per ID  - Fluconazole, Bactrim, and Valcyte for immunosuppression prophylaxis    Endo: DM type II, HLD, hypothyroidism  - Monitor glucose w/ Lantus 10 units at bedtime w/ ISS AC & QHS for glycemic control; HgbA1C 6.6% on 4/24  - Atorvastatin for HLD  - Synthroid for hypothyroidism    Code Status: Full code    [facility]    Rebecca Yepez PA-C     [id number]

## 2022-06-15 NOTE — PROGRESS NOTE ADULT - ATTENDING COMMENTS
67 y/o male w/ a PMHx of CAD s/p CABG, AF on Eliquis c/b CVA c/b seizures, HTN, HLD, DM type II, hypothyroidism, GI bleed due to a duodenal ulcer, and ESRD s/p DDRT c/b DGF requiring HD & large perinephric hematoma s/p evacuation w/ revision of arterial anastomosis who presented in status epilepticus requiring intubation with a hospital course c/b large right pleural effusion s/p thoracentesis c/b hemothorax while being anticoagulated s/p chest tube placement w/ CT demonstrating retained hemothorax    S/p rt VATS with drainage of 2.2L of hemorrhagic fluid  Slow to answer, on Keppra and valproic acid for seizure  Hemodynamically stable  Possible PO   Gentle hydration with .45 saline for hypernatremia  Abx Cefepime changed to Ertapenem for UTI  Transplant/antirejection/ ppx abx  ISS  Mechanical DVT ppx    Discussed with Dr Dunn

## 2022-06-15 NOTE — PROGRESS NOTE ADULT - PROBLEM SELECTOR PLAN 2
C/w prograf 0.5mg bid. Obtain tacrolimus level 30min prior to AM dose. C/w prednisone 5mg daily. C/w half-dose cellcept. Will consider changing tacro to belatacept since tacrolimus can lower seizure threshold. C/w bactrim, Valcyte, fluconazole 200mg daily (on fluconazole after repair of arterial anastomosis) for prophylaxis.    If you have any questions, please feel free to contact me  Ant Pantoja  Nephrology Fellow  [phone]; Prefer [employer] TEAMS  (After 5pm or on weekends please page the on-call fellow)

## 2022-06-15 NOTE — PROGRESS NOTE ADULT - SUBJECTIVE AND OBJECTIVE BOX
HISTORY:  67 y/o male w/ a PMHx of CAD s/p CABG (2020), AF on Eliquis c/b CVA (2019) c/b seizures, HTN, HLD, DM type II, hypothyroidism, GI bleed due to a duodenal ulcer (2/2022), and ESRD s/p DDRT (4/21/22) c/b DGF requiring HD (last session 4/29/22) & large perinephric hematoma s/p evacuation w/ revision of arterial anastomosis (5/2/22) who presented on 6/10 for status epilepticus. Patient was intubated for airway protection and transferred to MICU for further management. Patient was loaded w/ valproic acid and continued his home Keppra w/ resolution of his seizures on EEG so he was extubated on 6/11. Patient was noted to have a large right pleural effusion so thoracentesis was performed w/ 1.3 L of serosanguineous fluid drained. Patient was started on a heparin infusion for his atrial fibrillation post-procedure but had a H&H drop w/ CXR demonstrating complete right lung whiteout concerning for a hemothorax. Patient was transferred to SICU for further management. He was intubated for airway protection again, transfused several products, and reversed w/ protamine. A right 28 Fr chest tube was placed w/ a significant amount of sanguineous fluid drained and quality slowly improved to serosanguineous.     24 HOUR EVENTS:  - Restarted  mg PO BID  - Advanced to regular diet  - Adjusted NS at 25 mL/hr -> 1/2NS at 50 mL/hr  - ID consulted and adjusted cefepime to ertapenem  - Repeat COVID-19 PCR for OR negative  - Tacrolimus level 8.7  - Hyperglycemic overnight so started on Lantus 10 units at bedtime    NEURO  Exam: awake, alert, oriented x4, no acute distress, no acute focal deficits  Meds:  - acetaminophen     Tablet .. 975 milliGRAM(s) Oral every 6 hours PRN Mild Pain (1 - 3)  - levETIRAcetam  IVPB 250 milliGRAM(s) IV Intermittent every 12 hours  - valproate sodium  IVPB 750 milliGRAM(s) IV Intermittent every 8 hours    RESPIRATORY  RR: 31 (06-15-22 @ 00:00) (12 - 32)  SpO2: 97% (06-15-22 @ 00:00) (91% - 100%)  Exam: mild rhonchi in all lung fields    CARDIOVASCULAR  HR: 72 (06-15-22 @ 00:00) (60 - 79)  BP: 140/66 (06-14-22 @ 20:00) (140/66 - 150/67)  BP(mean): 95 (06-14-22 @ 20:00) (95 - 96)  ABP: 141/40 (06-15-22 @ 00:00) (133/38 - 168/48)  ABP(mean): 76 (06-15-22 @ 00:00) (69 - 92)  Exam: regular rate and rhythm, S1S2  Cardiac Rhythm: sinus  Perfusion    [x]Adequate    [ ]Inadequate  Mentation   [x]Normal       [ ]Reduced  Extremities  [x]Warm         [ ]Cool  Volume Status [ ]Hypervolemic [x]Euvolemic [ ]Hypovolemic    GI/NUTRITION  Exam: soft, nontender, nondistended, RLQ incision well-healed  Diet: regular  Meds:  - pantoprazole    Tablet 40 milliGRAM(s) Oral before breakfast  - polyethylene glycol 3350 17 Gram(s) Oral daily  - senna 2 Tablet(s) Oral at bedtime    GENITOURINARY  I&O's Detail  06-14 @ 07:01  -  06-15 @ 01:24  --------------------------------------------------------  IN:    IV PiggyBack: 100 mL    Oral Fluid: 860 mL    sodium chloride 0.45%: 750 mL    sodium chloride 0.9%: 75 mL  Total IN: 1785 mL    OUT:    Chest Tube (mL): 150 mL    Indwelling Catheter - Urethral (mL): 1345 mL  Total OUT: 1495 mL    Total NET: 290 mL    144  |  113<H>  |  49<H>  ----------------------------<  308<H>  5.2   |  19<L>  |  1.98<H>    Ca    8.5      15 Raffi 2022 00:22  Phos  2.8  Mg     1.9  TPro  5.5<L>  /  Alb  2.3<L>  /  TBili  0.2  /  DBili  x   /  AST  19  /  ALT  13  /  AlkPhos  84    Meds:   - mycophenolate mofetil 500 milliGRAM(s) Oral every 12 hours  - predniSONE   Tablet 5 milliGRAM(s) Oral daily  - sodium chloride 0.45% infuse at 50 mL/hr  - tacrolimus 0.5 milliGRAM(s) Oral every 12 hours    HEMATOLOGIC                        8.5    8.47  )-----------( 186      ( 15 Raffi 2022 00:23 )             26.0     PT/INR - ( 15 Raffi 2022 00:23 )   PT: 18.2 sec;   INR: 1.58 ratio    PTT - ( 15 Raffi 2022 00:23 )  PTT:33.0 sec    INFECTIOUS DISEASES  T(C): 36.5 (06-14-22 @ 23:00), Max: 37 (06-14-22 @ 08:00)  WBC Count:  - 8.47 K/uL (06-15 @ 00:23)  - 10.77 K/uL (06-14 @ 06:03)    Recent Cultures:  - Specimen Source: .Body Fluid Pleural Fluid, 06-11 @ 02:42  Results: No growth  Gram Stain: Few polymorphonuclear leukocytes seen. No organisms seen.  Organism: --  - Specimen Source: .Blood Blood, 06-10 @ 18:15  Results: No growth to date.  Gram Stain: --  Organism: --  - Specimen Source: .Blood Blood, 06-10 @ 18:00  Results: No growth to date.  Gram Stain: --  Organism: --    Meds:  - ertapenem  IVPB 1000 milliGRAM(s) IV Intermittent every 24 hours  - fluconAZOLE   Tablet 200 milliGRAM(s) Oral daily  - trimethoprim   80 mG/sulfamethoxazole 400 mG 1 Tablet(s) Oral every Mon, Weds, Fri  - valGANciclovir 450 milliGRAM(s) Oral every Mon, Weds, Fri    ENDOCRINE  Capillary Blood Glucose:  - 339 mg/dL (14 Jun 2022 22:42)  - 93 mg/dL (14 Jun 2022 16:36)  - 156 mg/dL (14 Jun 2022 12:07)  - 164 mg/dL (14 Jun 2022 09:42)  - 123 mg/dL (14 Jun 2022 05:52)  - 174 mg/dL (14 Jun 2022 02:45)  Meds:  - atorvastatin 40 milliGRAM(s) Oral at bedtime  - insulin glargine Injectable (LANTUS) 10 Unit(s) SubCutaneous at bedtime  - insulin lispro (ADMELOG) corrective regimen sliding scale   SubCutaneous three times a day before meals  - insulin lispro (ADMELOG) corrective regimen sliding scale   SubCutaneous at bedtime  - levothyroxine 50 MICROGram(s) Oral daily    ACCESS DEVICES:  [x] Peripheral IV  [ ] Central Venous Line		[ ] R	[ ] L	[ ] IJ	[ ] Fem	[ ] SC	Placed:   [x] Arterial Line			[x] R	[ ] L	[ ] Fem	[x] Rad	[ ] Ax	Placed: 6/11  [ ] PICC:					[ ] Mediport  [x] Urinary Catheter, Date Placed: 6/12  [x] Necessity of urinary, arterial, and venous catheters discussed    OTHER MEDICATIONS:  - chlorhexidine 2% Cloths 1 Application(s) Topical <User Schedule>  - magnesium oxide 400 milliGRAM(s) Oral every 8 hours  - multivitamin 1 Tablet(s) Oral daily    IMAGING:

## 2022-06-15 NOTE — PROGRESS NOTE ADULT - ASSESSMENT
66 yr old man with h/o DM type II, HTN, CAD s/p CABG in 2020, Afib on Eliquis, CVA in 2019 due to Afib, Seizure d/o (last episode was on 4/8/22), h/o GI bleed in 2/2022 EGD with duodenal ulcer and ESRD on HD s/p DDRT from DCD donor on 4/21/22 complicated by DGF requiring HD until 4/29/22 now with downtrending creatinine who presented with status epilepticus in setting of UTI/antibiotics and sub-therapeutic valproic acid level     Seizure  - status epilepticus in setting of UTI/antibiotics and sub-therapeutic valproic acid level   - No seizure activity recorded on EEG.  EEG disconnected  - On Keppra and Valproic acid, adjusted by Neuro  - Appreciate Neurology recommendations    Hypoxemis resp failure:  - Extubated 6/13  - S/P VATS today  - H&H stable 8.8/26.8 post one u PRBC 6/13 (hemorrhagic shock S/P 5 PRBC, 2 FFP and Protamine for reversal of Heparin)    DCD DDRT 4/21/22  - Creatinine downtrending, good UOP   - Strict I/Os  - Regular diet  - Immunosuppression: Prograf 0.5mg BID, MMF 500mg bid, prednisone 5mg daily.   - PPX: Bactrim 3 x w, Valcyte (MWF), Fluconazole 200mg daily (on fluconazole after repair of arterial anastomosis)  - Daily labs, CBC, CMP, Mag, Phos, Tacrolimus level daily in AM prior to dose    Klebsiella UTI  - ID consulted.    - Continue Ertepenam  - Blood Cx  6/10 ngtd    DM  - Glucose improved  - Continue Lantus 10u qpm and SSI coverage

## 2022-06-16 NOTE — PROGRESS NOTE ADULT - ASSESSMENT
66 year old Male with PMHx ESRD s/p permacath removal 5/10/22 s/p Rt DDRT 4/21/22,  CVA (2019), Afib on apixaban, seizure activity, CAD s/p stents, CABG (2020), HTN, HLD, DM on insulin, gastric/duodenal ulcer, recent hospitalization 4/28/22 -5/10/22 with weakness/anemia found to have perinephric hematoma requiring evacuation and repair of bleeding arterial anastomosis who presented to [facility] for status epilepticus requiring intubation for hypoxic respiratory failure.    At [facility] was intubated on 6/10  On 6/11 underwent thoracentesis in context of Eliquis therapy  Developed bleeding into pleural space and require Chest tube placement  Planned for VATS    COVID19/FLU/RSV PCR (6/10) Negative  U/A (6/10) with 5 WBC and 25 RBC  Blood Cultures (6/10) NGTD  Pleural Fluid with 71 nucleated cells, 50% PMN, 31% Lymph.   Pleural Fluid Culture (6/11) Negative   CT Chest (6/12) with Right-sided chest tube with large hemothorax and atelectasis of the right lower lobe.    On 6/15 s/p uniportal R VATS, evacuation of hemothorax, pneumonolysis, decortication, intercostal nerve block.    #Positive Urine Culture (?UTI), Leukocytosis  Of note, was being treated for UTI at rehab  UCx from 6/1 with >100K ESBL Klebsiella (Cipro R but Levofloxacin S)  He received 1-2 doses of Ertapenem and was then switched to Levofloxacin  Doubt the Levofloxacin was effective therapy given Cipro resistance  Unclear if positive UCx represented UTI but reasonable to at least give short course of effective/reliable therapy  --Continue Ertapenem 1g IV Q24H (plan for 3-5 day course; today is day 3)  --Continue to follow CBC with diff  --Continue to follow temperature curve  --Follow up on preliminary blood cultures    #Renal Transplant Recipient, Prophylactic Antibiotic  --Continue Bactrim for PCP PPx  --Continue Valcyte for CMV PPx    I will continue to follow. Please feel free to contact me with any further questions.    Carlton Hoover M.D.  [facility] Division of Infectious Disease  8AM-5PM Monday - Friday: Available on [employer] Teams  After Hours and Holidays (or if no response on [employer] Teams): Please contact the Infectious Diseases Office at [phone]    The above assessment and plan were discussed with Desi, transplant surgery NP

## 2022-06-16 NOTE — PROGRESS NOTE ADULT - ASSESSMENT
Mr. Castelan is a 66 yr old man with PMHx DM type II, HTN, CAD s/p CABG in 2020, Afib on Eliquis, CVA in 2019 due to Afib, Seizure d/o following CVA last episode was on 4/8/22, h/o GI bleed in 2/2022 EGD with duodenal ulcer,     ESRD s/p DDRT on 4/21/22 (course complicated by DGF, was on HD until 4/29/22, large perinephric hematoma s/p evacuation and repair of arterial anastomosis on 5/2/22) presented from rehab 6/10 with status epilepticus secondary to likely UTI, intubated for airway protection, s/p right thoracentesis complicated by hemothorax.  - 6/11 chest tube placed- 1liter drained   6/12-  intubated sedated CT scan completed  6/13  - Intubated   Chest tube 130/530 for OR with Dr Stover date Wednesday Lizett 15 at 1330  6/14   remains extubated,     vss   spoke to son  received consent for OR   in am  0730  rt chest tube  6/16    remains   extubated,     vss    2 rt chest tube lws

## 2022-06-16 NOTE — PROGRESS NOTE ADULT - ATTENDING COMMENTS
65 y/o male w/ a PMHx of CAD s/p CABG, AF on Eliquis c/b CVA c/b seizures, HTN, HLD, DM type II, hypothyroidism, GI bleed due to a duodenal ulcer, and ESRD s/p DDRT c/b DGF requiring HD & large perinephric hematoma s/p evacuation w/ revision of arterial anastomosis who presented in status epilepticus requiring intubation with a hospital course c/b large right pleural effusion s/p thoracentesis c/b hemothorax while being anticoagulated s/p chest tube placement w/ CT demonstrating retained hemothorax    Awake and alert, comfortable  On Keppra and valproic acid for seizure  Hemodynamically stable  PO   Insulin drip gtt  Abx Ertapenem and Diflucan for UTI  Transplant/antirejection/ ppx abx. Rx of hyperkalemia. Bactrim discontinues sec to persistent hyperkalemia  ISS  Mechanical DVT ppx    Discussed with Dr David

## 2022-06-16 NOTE — PROGRESS NOTE ADULT - SUBJECTIVE AND OBJECTIVE BOX
VITAL SIGNS    Vital Signs Last 24 Hrs  T(C): 36.4 (22 @ 07:00), Max: 36.4 (06-15-22 @ 15:00)  T(F): 97.5 (22 @ 07:00), Max: 97.5 (06-15-22 @ 15:00)  HR: 74 (22 @ 11:00) (61 - 74)  BP: 170/70 (06-15-22 @ 19:23) (170/70 - 170/70)  RR: 31 (22 @ 11:00) (12 - 31)  SpO2: 100% (22 @ 11:00) (96% - 100%)                   Daily     Daily Weight in k.3 (2022 06:48)      Bilirubin Total, Serum: 0.2 mg/dL ( @ 05:56)  Bilirubin Total, Serum: 0.2 mg/dL (06-15 @ 17:43)    CAPILLARY BLOOD GLUCOSE      POCT Blood Glucose.: 230 mg/dL (2022 11:02)  POCT Blood Glucose.: 246 mg/dL (2022 10:06)  POCT Blood Glucose.: 219 mg/dL (2022 09:08)  POCT Blood Glucose.: 233 mg/dL (2022 08:02)  POCT Blood Glucose.: 293 mg/dL (2022 06:58)  POCT Blood Glucose.: 301 mg/dL (2022 05:56)  POCT Blood Glucose.: 327 mg/dL (2022 04:37)  POCT Blood Glucose.: 299 mg/dL (2022 01:26)  POCT Blood Glucose.: 290 mg/dL (15 Raffi 2022 21:15)  POCT Blood Glucose.: 228 mg/dL (15 Raffi 2022 17:21)          drains    2            R Pleural lws                        PHYSICAL EXAM    CV :  RRR  Lungs:   CTA B/L  Abdomen: soft, nontender, nondistended, positive bowel sounds,  Extremities:     no edema   no calf tenderness

## 2022-06-16 NOTE — PROGRESS NOTE ADULT - ATTENDING COMMENTS
Pt s/p VATS yesterday and today extubated.  Feels ok but weak.    Renal function stable  K slightly elevated, agree with insulin drip and hold bactrim.

## 2022-06-16 NOTE — PROGRESS NOTE ADULT - NUTRITIONAL ASSESSMENT
This patient has been assessed with a concern for Malnutrition and has been determined to have a diagnosis/diagnoses of Severe protein-calorie malnutrition.    This patient is being managed with:   Diet Regular-  Consistent Carbohydrate {Evening Snack} (CSTCHOSN)  Soft and Bite Sized (SOFTBTSZ)  Mildly Thick Liquids (MILDTHICKLIQS)  Supplement Feeding Modality:  Oral  Ensure Pudding Cans or Servings Per Day:  1       Frequency:  Three Times a day  Entered: Raffi 15 2022  8:09AM

## 2022-06-16 NOTE — PROGRESS NOTE ADULT - PROBLEM SELECTOR PLAN 2
C/w prograf 0.5mg bid. Obtain tacrolimus level 30min prior to AM dose. C/w prednisone 5mg daily. C/w half-dose cellcept. Will consider changing tacro to belatacept since tacrolimus can lower seizure threshold. C/w Valcyte, and fluconazole 200mg daily (on fluconazole after repair of arterial anastomosis) for prophylaxis. D/c bactrim for hyperkalemia, after glucose control will resume.      If you have any questions, please feel free to contact me  Ant Pantoja  Nephrology Fellow  [phone]; Prefer [employer] TEAMS  (After 5pm or on weekends please page the on-call fellow)

## 2022-06-16 NOTE — PROGRESS NOTE ADULT - PROBLEM SELECTOR PLAN 1
S/p DDRT on 4/21/22 Cr. stable in 2 range. ANA in the setting of hemodynamic instability 2/2 ATN. Donor was 58 , KDPI 82%, DCD, single vessels and ureter, HLA mismatch 1, 2, 2. No DSA, cPRA 0%. 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 Readmitted in May for anemia in the setting of large perinephric hematoma s/p evacuation and repair of arterial anastomosis on 5/2/22. Intra operative biopsy showed no rejection. As outpatient SCr. in th low 2 range. Admitted with lowest documented Cr. of 1.8 since transplant which has trended to 1.7 today. Switched cefepime to Ertapenem for UTI because it is less likely to induce seizures, follow cultures and sensitivities. Optimize hemodynamics. Adequate UOP. Start NaHCO3 for acidosis and will help with kaliuresis. Start Nifedipine 30mg Daily for hypertension.

## 2022-06-16 NOTE — PROGRESS NOTE ADULT - ASSESSMENT
66 yr old man with h/o DM type II, HTN, CAD s/p CABG in 2020, Afib on Eliquis, CVA in 2019 due to Afib, Seizure d/o (last episode was on 4/8/22), h/o GI bleed in 2/2022 EGD with duodenal ulcer and ESRD on HD s/p DDRT from DCD donor on 4/21/22 complicated by DGF requiring HD until 4/29/22 now with downtrending creatinine who presented with status epilepticus in setting of UTI/antibiotics and sub-therapeutic valproic acid level     Seizure  - status epilepticus in setting of UTI/antibiotics and sub-therapeutic valproic acid level   - No seizure activity recorded on EEG.  EEG disconnected  - On Keppra and Valproic acid, adjusted by Neuro  - Appreciate Neurology recommendations    Hypoxemis resp failure:  - Intubated 6/11 for respiratory failure resulting from hemothorax which occurred following Thoracentesis on 6/11  - Extubated 6/13  - S/P VATS on 6/15  Discuss plan for CTs  - Hgb stable.   Received 5 PRBC, 2 FFP and Protamine for reversal of Heparin on 6/11 (total 6PRBC since admission)    DCD DDRT 4/21/22  - Creatinine stable ~ 1.7, good UOP   - Strict I/Os  - DC Dasia   - Dc Miranda  - Regular diet  - Start Na HCO3 1300mg TID  - Immunosuppression: Prograf 0.5mg BID, MMF 500mg bid, prednisone 5mg daily  - PPX: Valcyte (MWF), Fluconazole 200mg daily (on fluconazole after repair of arterial anastomosis).  Hold Bactrim for hyperkalemia.  - Daily labs CBC, CMP, Mag, Phos, Tacrolimus level daily in AM prior to dose    Klebsiella UTI  - ID consulted  - Continue Ertapenam (previous ESBL Kleb UTI).  Fu ID re: LOT  - Blood Cx  6/10 ngtd    DM  - Glucose elevated  - On Insulin drip    HTN:  - Start Nifedipine 30mg po qd

## 2022-06-16 NOTE — PROGRESS NOTE ADULT - ASSESSMENT
Patient is a 67 y/o male w/ a PMHx of CAD s/p CABG (2020), AF on Eliquis c/b CVA (2019) c/b seizures, HTN, HLD, DM type II, hypothyroidism, GI bleed due to a duodenal ulcer (2/2022), and ESRD s/p DDRT (4/21/22) c/b DGF requiring HD (last session 4/29/22) & large perinephric hematoma s/p evacuation w/ revision of arterial anastomosis (5/2/22) who presented on 6/10 for status epilepticus. Patient was intubated for airway protection and transferred to MICU for further management. resolution of his seizures on EEG and patient was extubated on 6/11. Patient was noted to have a large right pleural effusion, thoracentesis was performed w/ 1.3 L of serosanguineous fluid . Patient subsequenlty started on a heparin infusion for his atrial fibrillation post-procedure but had a H&H drop w/ CXR demonstrating complete right lung whiteout concerning for a hemothorax. Patient was transferred to SICU for further management. He was intubated for airway protection again, transfused with blood products, and reversed w/ protamine. A right 28 Fr chest tube was placed w/ a significant amount of sanguineous fluid drained. 6/15 Patient went to OR with thoracic for Right VATS and second chest tube placement. Post-procedure patient extubated and transferred back to SICU for further hemodynamic monitoring.        Neuro: sleepy  - Assess neurological status every 1hour for  -MS: opens eyes, follows commands, no focal deficit  - Multimodal pain control w/ tylenol and oxycodone 2.5mg  -if mental status continues to be sleepy, may d/c oxy and give morphine    Resp:  - Out of bed to chair, ambulate as tolerated, and incentive spirometry to prevent atelectasis  -keep sats >92%    CVS: hypotension during VATS  - BP goal MAP > 65 mmHg  - lactate :     GI:   -   - Bowel regimen with senna & Miralax  - Protonix for stress ulcer prophylaxis while intubated/ npo    Renal:  - Monitor I&Os and electrolytes  - replete electrolytes as needed     Heme:  - Monitor CBC and coags  -  VTE prophylaxis:   -SCD's    ID: sepsis:  - Monitor for clinical evidence of active infection  - Monitor WBC, temperature, and procalcitonin  - Antibiotcis:  -Cultures:     Endo: hx of DM, Hypothyroidism  - Monitor glucose of bmp  - HgbA1C  - for HLD  - for hypothyroidism    GOC:     Lines/Tubes:         Patient is a 65 y/o male w/ a PMHx of CAD s/p CABG (2020), AF on Eliquis c/b CVA (2019) c/b seizures, HTN, HLD, DM type II, hypothyroidism, GI bleed due to a duodenal ulcer (2/2022), and ESRD s/p DDRT (4/21/22) c/b DGF requiring HD (last session 4/29/22) & large perinephric hematoma s/p evacuation w/ revision of arterial anastomosis (5/2/22) who presented on 6/10 for status epilepticus. Patient was intubated for airway protection and transferred to MICU for further management. resolution of his seizures on EEG and patient was extubated on 6/11. Patient was noted to have a large right pleural effusion, thoracentesis was performed w/ 1.3 L of serosanguineous fluid . Patient subsequenlty started on a heparin infusion for his atrial fibrillation post-procedure but had a H&H drop w/ CXR demonstrating complete right lung whiteout concerning for a hemothorax. Patient was transferred to SICU for further management. He was intubated for airway protection again, transfused with blood products, and reversed w/ protamine. A right 28 Fr chest tube was placed w/ a significant amount of sanguineous fluid drained. 6/15 Patient went to OR with thoracic for Right VATS and second chest tube placement. Post-procedure patient extubated and transferred back to SICU for further hemodynamic monitoring.        Neuro: sleepy  - Assess neurological status every 1hour for  -MS: opens eyes, follows commands, no focal deficit  - Multimodal pain control w/ tylenol and oxycodone 2.5mg  -if mental status continues to be sleepy, may d/c oxy and give morphine  -seizure: keppra 250mg q12h, valproate 750mg     Resp:  - Out of bed to chair, ambulate as tolerated, and incentive spirometry to prevent atelectasis  -keep sats >92%    CVS: hypotension during VATS  - off vasopressors  - BP goal MAP > 65 mmHg  - lactate : 0.6  -atorvastatin 40mg    GI:   - carb consistent diet, soft with thick liquids  - Bowel regimen with senna & Miralax  - Protonix home meds    Renal: ESRD s/p DDRT (4/21/22) c/b DGF requiring HD (last session 4/29/22) & large perinephric hematoma s/p evacuation w/ revision of arterial anastomosis (5/2/22)   - Monitor I&Os and electrolytes  - replete electrolytes as needed   -mycophenolate 500mg twice a day and tacrolimus 0.5mg    Heme:  - Monitor CBC and coags  -  VTE prophylaxis: on hold as per primary team   -SCD's    ID:   - Monitor for clinical evidence of active infection  - Monitor WBC, temperature, and procalcitonin  - Antibiotcis: emp [id number] q24h, and fluconazole  - ppx ABX: bactrm and valganciclovir  -Cultures: NTD    Endo: hx of DM, Hypothyroidism  - ISS q6h , lantus 10units and extra 4u   - for HLD : atorvastatin 40mg bedtime  - for hypothyroidism : levothyroxine     GOC:   full code             Patient is a 65 y/o male w/ a PMHx of CAD s/p CABG (2020), AF on Eliquis c/b CVA (2019) c/b seizures, HTN, HLD, DM type II, hypothyroidism, GI bleed due to a duodenal ulcer (2/2022), and ESRD s/p DDRT (4/21/22) c/b DGF requiring HD (last session 4/29/22) & large perinephric hematoma s/p evacuation w/ revision of arterial anastomosis (5/2/22) who presented on 6/10 for status epilepticus. Patient was intubated for airway protection and transferred to MICU for further management. resolution of his seizures on EEG and patient was extubated on 6/11. Patient was noted to have a large right pleural effusion, thoracentesis was performed w/ 1.3 L of serosanguineous fluid . Patient subsequenlty started on a heparin infusion for his atrial fibrillation post-procedure but had a H&H drop w/ CXR demonstrating complete right lung whiteout concerning for a hemothorax. Patient was transferred to SICU for further management. He was intubated for airway protection again, transfused with blood products, and reversed w/ protamine. A right 28 Fr chest tube was placed w/ a significant amount of sanguineous fluid drained. 6/15 Patient went to OR with thoracic for Right VATS and second chest tube placement. Post-procedure patient extubated and transferred back to SICU for further hemodynamic monitoring.        Neuro: sleepy  - Assess neurological status every 1hour for  -MS: opens eyes, follows commands, no focal deficit  - Multimodal pain control w/ tylenol and oxycodone 2.5mg  -if mental status continues to be sleepy, may d/c oxy and give morphine  -seizure: keppra 250mg q12h, valproate 750mg     Resp:  - Out of bed to chair, ambulate as tolerated, and incentive spirometry to prevent atelectasis  -keep sats >92%    CVS: hypotension during VATS  - off vasopressors  - BP goal MAP > 65 mmHg  - lactate : 0.6  -atorvastatin 40mg    GI:   - carb consistent diet, soft with thick liquids  - Bowel regimen with senna & Miralax  - Protonix home meds    Renal: ESRD s/p DDRT (4/21/22) c/b DGF requiring HD (last session 4/29/22) & large perinephric hematoma s/p evacuation w/ revision of arterial anastomosis (5/2/22)   -hyperkalemia post-op  -ekg unremarkable, given 5mg of lokelma   - Monitor I&Os and electrolytes  - replete electrolytes as needed   -mycophenolate 500mg twice a day and tacrolimus 0.5mg    Heme:  - Monitor CBC and coags  -  VTE prophylaxis: on hold as per primary team   -SCD's    ID:   - Monitor for clinical evidence of active infection  - Monitor WBC, temperature, and procalcitonin  - Antibiotcis: emp [id number] q24h, and fluconazole  - ppx ABX: bactrm and valganciclovir  -Cultures: NTD    Endo: hx of DM, Hypothyroidism  - ISS q6h , lantus 10units and extra 4u   - for HLD : atorvastatin 40mg bedtime  - for hypothyroidism : levothyroxine     GOC:   full code

## 2022-06-16 NOTE — PROGRESS NOTE ADULT - SUBJECTIVE AND OBJECTIVE BOX
Follow Up:  UTI    Interval History: Afebrile. On 6/15 s/p uniportal R VATS, evacuation of hemothorax, pneumonolysis, decortication, intercostal nerve block.    REVIEW OF SYSTEMS  [  ] ROS unobtainable because:    [ x ] All other systems negative except as noted below    Constitutional:  [ ] fever [ ] chills  [ ] weight loss  [ x] weakness  Skin:  [ ] rash [ ] phlebitis	  Eyes: [ ] icterus [ ] pain  [ ] discharge	  ENMT: [ ] sore throat  [ ] thrush [ ] ulcers [ ] exudates  Respiratory: [ ] dyspnea [ ] hemoptysis [ ] cough [ ] sputum	  Cardiovascular:  [ ] chest pain [ ] palpitations [ ] edema	  Gastrointestinal:  [ ] nausea [ ] vomiting [ ] diarrhea [ ] constipation [ ] pain	  Genitourinary:  [ ] dysuria [ ] frequency [ ] hematuria [ ] discharge [ ] flank pain  [ ] incontinence  Musculoskeletal:  [ ] myalgias [ ] arthralgias [ ] arthritis  [ ] back pain  Neurological:  [ ] headache [ ] seizures  [ ] confusion/altered mental status    Allergies  No Known Allergies        ANTIMICROBIALS:  ertapenem  IVPB 1000 every 24 hours  fluconAZOLE   Tablet 200 daily  valGANciclovir 450 <User Schedule>      OTHER MEDS:  MEDICATIONS  (STANDING):  acetaminophen     Tablet .. 975 every 6 hours PRN  atorvastatin 40 at bedtime  insulin glargine Injectable (LANTUS) 32 once  insulin lispro (ADMELOG) corrective regimen sliding scale  three times a day before meals  insulin lispro (ADMELOG) corrective regimen sliding scale  at bedtime  insulin lispro Injectable (ADMELOG) 5 three times a day before meals  levETIRAcetam  IVPB 250 every 12 hours  levothyroxine 50 daily  mycophenolate mofetil 500 <User Schedule>  NIFEdipine XL 30 daily  oxyCODONE    IR 2.5 every 4 hours PRN  pantoprazole    Tablet 40 before breakfast  polyethylene glycol 3350 17 daily  predniSONE   Tablet 5 daily  senna 2 at bedtime  valproate sodium  IVPB 750 every 8 hours      Vital Signs Last 24 Hrs  T(C): 36.7 (16 Jun 2022 11:00), Max: 36.7 (16 Jun 2022 11:00)  T(F): 98.1 (16 Jun 2022 11:00), Max: 98.1 (16 Jun 2022 11:00)  HR: 71 (16 Jun 2022 15:00) (61 - 79)  BP: 134/66 (16 Jun 2022 15:00) (134/66 - 170/70)  BP(mean): 94 (16 Jun 2022 15:00) (94 - 101)  RR: 11 (16 Jun 2022 15:00) (9 - 31)  SpO2: 100% (16 Jun 2022 15:00) (96% - 100%)    PHYSICAL EXAMINATION:  General: Alert and Awake, NAD  Cardiac: RRR, No M/R/G  Resp: CTAB, No Wh/Rh/Ra  Abdomen: NBS, NT/ND, No HSM, No rigidity or guarding  MSK: No LE edema. No Calf tenderness  Skin: No rashes or lesions. Skin is warm and dry to the touch.   Neuro: Alert and Awake. CN 2-12 Grossly intact. Moves all four extremities spontaneously.  Psych: Calm, Pleasant, Cooperative                          9.0    6.75  )-----------( 195      ( 16 Jun 2022 05:56 )             28.7       06-16    143  |  112<H>  |  49<H>  ----------------------------<  226<H>  5.0   |  20<L>  |  1.70<H>    Ca    9.4      16 Jun 2022 11:24  Phos  4.2     06-16  Mg     2.2     06-16    TPro  5.5<L>  /  Alb  2.2<L>  /  TBili  0.2  /  DBili  x   /  AST  15  /  ALT  9<L>  /  AlkPhos  83  06-16          MICROBIOLOGY:  v  .Body Fluid Pleural Fluid  06-11-22   No growth at 5 days  --    Few polymorphonuclear leukocytes seen  No organisms seen      .Blood Blood  06-10-22   No Growth Final  --  --      .Blood Blood  06-10-22   No Growth Final  --  --    RADIOLOGY:    <The imaging below has been reviewed and visualized by me independently. Findings as detailed in report below>    < from: Xray Chest 1 View- PORTABLE-Routine (Xray Chest 1 View- PORTABLE-Routine in AM.) (06.16.22 @ 07:02) >  IMPRESSION:  Small layering right pleural effusion.    < end of copied text >

## 2022-06-16 NOTE — PROGRESS NOTE ADULT - SUBJECTIVE AND OBJECTIVE BOX
[facility] DIVISION OF KIDNEY DISEASES AND HYPERTENSION -- FOLLOW UP NOTE  --------------------------------------------------------------------------------  HPI:  66 yr old man with ESRD due to DM was on HD since 2019, s/p DCD DDRT on 4/21/22. Donor was 58 , KDPI 82%, DCD, single vessels and ureter, HLA mismatch 1, 2, 2. No DSA, cPRA 0%. 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 Readmitted in May for anemia in the setting of large perinephric hematoma s/p evacuation and repair of arterial anastomosis on 5/2/22. Intra operative biopsy showed no rejection, Creatinine ranging 2mg/dL. He was recently dx with klebsiella UTI rx with ertapenem - then switched to Levaquin day #6. He also had parainfluenza pneumonia. Was at rehab progressing well. Presented with status epilepticus. Intubated for respiratory protection. Right pleural effusion drained 1300ml. Transfused 1 unit PRBC. Pt. transferred to SICU and was transfused 4 additional units.     Pt. seen this AM in SICU. Tolerated VATs had 2.2 L of blood fluid removed. SCr. remains stable. Pt. confused overnight, speaking mainly alice now. incisinal pain around new chest tube. hyperkalemic and hyperglycemic overnight now on insulin gtt.         PAST HISTORY  --------------------------------------------------------------------------------  No significant changes to PMH, PSH, FHx, SHx, unless otherwise noted    ALLERGIES & MEDICATIONS  --------------------------------------------------------------------------------  Allergies    No Known Allergies    Intolerances      Standing Inpatient Medications  atorvastatin 40 milliGRAM(s) Oral at bedtime  chlorhexidine 2% Cloths 1 Application(s) Topical <User Schedule>  ertapenem  IVPB 1000 milliGRAM(s) IV Intermittent every 24 hours  fluconAZOLE   Tablet 200 milliGRAM(s) Oral daily  insulin regular Infusion 4 Unit(s)/Hr IV Continuous <Continuous>  levETIRAcetam  IVPB 250 milliGRAM(s) IV Intermittent every 12 hours  levothyroxine 50 MICROGram(s) Oral daily  magnesium oxide 400 milliGRAM(s) Oral every 8 hours  multivitamin 1 Tablet(s) Oral daily  mycophenolate mofetil 500 milliGRAM(s) Oral <User Schedule>  pantoprazole    Tablet 40 milliGRAM(s) Oral before breakfast  polyethylene glycol 3350 17 Gram(s) Oral daily  predniSONE   Tablet 5 milliGRAM(s) Oral daily  senna 2 Tablet(s) Oral at bedtime  sodium zirconium cyclosilicate 5 Gram(s) Oral once  tacrolimus 0.5 milliGRAM(s) Oral <User Schedule>  trimethoprim   80 mG/sulfamethoxazole 400 mG 1 Tablet(s) Oral <User Schedule>  valGANciclovir 450 milliGRAM(s) Oral <User Schedule>  valproate sodium  IVPB 750 milliGRAM(s) IV Intermittent every 8 hours    PRN Inpatient Medications  acetaminophen     Tablet .. 975 milliGRAM(s) Oral every 6 hours PRN  oxyCODONE    IR 2.5 milliGRAM(s) Oral every 4 hours PRN      REVIEW OF SYSTEMS  --------------------------------------------------------------------------------  Unable to obtain. As per above after discussion with Dr. Kindra GUTIERREZ/PHYSICAL EXAM  --------------------------------------------------------------------------------  T(C): 36.4 (06-16-22 @ 07:00), Max: 36.4 (06-15-22 @ 11:00)  HR: 72 (06-16-22 @ 09:00) (59 - 73)  BP: 170/70 (06-15-22 @ 19:23) (170/70 - 170/70)  RR: 24 (06-16-22 @ 09:00) (12 - 24)  SpO2: 100% (06-16-22 @ 09:00) (96% - 100%)  Wt(kg): --  Height (cm): 177.8 (06-15-22 @ 07:24)  Weight (kg): 61.7 (06-15-22 @ 07:24)  BMI (kg/m2): 19.5 (06-15-22 @ 07:24)  BSA (m2): 1.77 (06-15-22 @ 07:24)      06-15-22 @ 07:01  -  06-16-22 @ 07:00  --------------------------------------------------------  IN: 1169 mL / OUT: 2145 mL / NET: -976 mL    06-16-22 @ 07:01  -  06-16-22 @ 09:54  --------------------------------------------------------  IN: 10 mL / OUT: 200 mL / NET: -190 mL      Physical Exam:  	Gen: Awake, sitting up on RA  	HEENT: PERRL, MMM   	Pulm: CTAB anteriorly, Decreased BS in R. base  	CV: S1S2+  	Abd: +BS, soft, non-tender          Transplant: No tenderness, swelling  	: No suprapubic tenderness  	MSK: no LE edema, some bilateral UE edema    	Psych: normal affect   	Skin: Warm          Access: CVC catheter    LABS/STUDIES  --------------------------------------------------------------------------------              9.0    6.75  >-----------<  195      [06-16-22 @ 05:56]              28.7     141  |  112  |  49  ----------------------------<  327      [06-16-22 @ 05:56]  5.6   |  18  |  1.70        Ca     8.5     [06-16-22 @ 05:56]      Mg     2.2     [06-16-22 @ 05:56]      Phos  4.2     [06-16-22 @ 05:56]    TPro  5.5  /  Alb  2.2  /  TBili  0.2  /  DBili  x   /  AST  15  /  ALT  9   /  AlkPhos  83  [06-16-22 @ 05:56]    PT/INR: PT 15.7 , INR 1.35       [06-16-22 @ 05:56]  PTT: 31.9       [06-16-22 @ 05:56]      Creatinine Trend:  SCr 1.70 [06-16 @ 05:56]  SCr 1.75 [06-16 @ 00:44]  SCr 1.68 [06-15 @ 20:37]  SCr 1.66 [06-15 @ 17:43]  SCr 1.83 [06-15 @ 11:22]    Tacrolimus (), Serum: 7.5 ng/mL (06-15 @ 05:39)  Tacrolimus (), Serum: 8.7 ng/mL (06-14 @ 06:03)  Tacrolimus (), Serum: 9.8 ng/mL (06-13 @ 05:59)  Tacrolimus (), Serum: 3.6 ng/mL (06-11 @ 06:44)      Urinalysis - [06-10-22 @ 22:28]      Color Light Yellow / Appearance Clear / SG 1.016 / pH 6.0      Gluc 100 mg/dL / Ketone Negative  / Bili Negative / Urobili Negative       Blood Small / Protein Trace / Leuk Est Negative / Nitrite Negative      RBC 25 / WBC 5 / Hyaline  / Gran  / Sq Epi  / Non Sq Epi 1 / Bacteria Negative    Urine Creatinine 38      [06-11-22 @ 09:32]  Urine Protein 18      [06-11-22 @ 09:32]  Urine Sodium 50      [06-11-22 @ 09:32]  Urine Potassium 10      [06-11-22 @ 09:32]  Urine Chloride <20      [06-11-22 @ 09:32]    Iron 17, TIBC 171, %sat 10      [05-02-22 @ 13:03]  Ferritin 1505      [05-02-22 @ 13:05]  PTH -- (Ca 9.2)      [02-05-22 @ 10:13]   294  HbA1c 6.0      [02-21-20 @ 08:53]  Lipid: chol 118, TG 54, HDL 59, LDL --      [06-27-21 @ 09:44]         General Surgery Week 4 Survey      Responses   Facility patient discharged from?  [place]   Does the patient have one of the following disease processes/diagnoses(primary or secondary)?  General Surgery   Week 4 attempt successful?  Yes   Call start time  1022   Call end time  1027   Discharge diagnosis  S/P NEHAL C5-6, EOF C5-6, ACDF with instrumentation C3-5   Is patient permission given to speak with other caregiver?  Yes   Person spoke with today (if not patient) and relationship  Gerardo/ son    Is the patient taking all medications as directed (includes completed medication regime)?  Yes   Has the patient kept scheduled appointments due by today?  Yes   Is the patient still receiving Home Health Services?  N/A   Psychosocial issues?  No   Comments  Throat is sore and has had trouble swallowing sinc surgery, not severe has been handling it ok   What is the patient's perception of their health status since discharge?  Improving   Nursing interventions  Nurse provided patient education   Is the patient/caregiver able to teach back steps to recovery at home?  Set small, achievable goals for return to baseline health   Is the patient/caregiver able to teach back the hierarchy of who to call/visit for symptoms/problems? PCP, Specialist, Home health nurse, Urgent Care, ED, 911  Yes   Additional teach back comments  She says the incision is fine.  She is taking pain medication and has an appt on Monday.   Week 4 call completed?  Yes   Would the patient like one additional call?  No   Graduated  Yes   Did the patient feel the follow up calls were helpful during their recovery period?  Yes   Was the number of calls appropriate?  Yes   Wrap up additional comments  Patient is doing great.           Clifton Castaneda RN

## 2022-06-16 NOTE — PROGRESS NOTE ADULT - SUBJECTIVE AND OBJECTIVE BOX
24 HOUR EVENTS:  - to OR with thoracic for right VATS evacuation of 2.2L of old blood with additional insertion of chest tube.   post-op patient is lethargic  - IVL and resumed diet  - given extra 4unit of lantus for hyperglycemia  -post-op patient is hyperkalemic to  5.5, EKG insignificant, given 5mg lokelma  -added 0xycodone for pain control    HISTORY  Patient is a 65 y/o male w/ a PMHx of CAD s/p CABG (2020), AF on Eliquis c/b CVA (2019) c/b seizures, HTN, HLD, DM type II, hypothyroidism, GI bleed due to a duodenal ulcer (2/2022), and ESRD s/p DDRT (4/21/22) c/b DGF requiring HD (last session 4/29/22) & large perinephric hematoma s/p evacuation w/ revision of arterial anastomosis (5/2/22) who presented on 6/10 for status epilepticus. Patient was intubated for airway protection and transferred to MICU for further management. Patient was loaded w/ valproic acid and continued his home Keppra w/ resolution of his seizures on EEG and patient was extubated on 6/11. Patient was noted to have a large right pleural effusion, thoracentesis was performed w/ 1.3 L of serosanguineous fluid . Patient subsequenlty started on a heparin infusion for his atrial fibrillation post-procedure but had a H&H drop w/ CXR demonstrating complete right lung whiteout concerning for a hemothorax. Patient was transferred to SICU for further management. He was intubated for airway protection again, transfused with blood products, and reversed w/ protamine. A right 28 Fr chest tube was placed w/ a significant amount of sanguineous fluid drained. 6/15 Patient went to OR with thoracic for Right VATS and second chest tube placement. Post-procedure patient extubated and transferred back to SICU for further hemodynamic monitoring.    SUBJECTIVE/ROS:  [x ] A ten-point review of systems was otherwise negative except as noted.  [ ] Due to altered mental status/intubation, subjective information were not able to be obtained from the patient. History was obtained, to the extent possible, from review of the chart and collateral sources of information.      NEURO  Exam: lethargic, opens eyes to commands, no focal deficit  Meds: acetaminophen     Tablet .. 975 milliGRAM(s) Oral every 6 hours PRN Mild Pain (1 - 3)  levETIRAcetam  IVPB 250 milliGRAM(s) IV Intermittent every 12 hours  oxyCODONE    IR 2.5 milliGRAM(s) Oral every 4 hours PRN mod/sever pain  valproate sodium  IVPB 750 milliGRAM(s) IV Intermittent every 8 hours    [x] Adequacy of sedation and pain control has been assessed and adjusted      RESPIRATORY  RR: 20 (06-15-22 @ 23:00) (13 - 25)  SpO2: 99% (06-15-22 @ 23:00) (95% - 100%)  Exam: unlabored, clear to auscultation bilaterally  ABG - ( 15 Raffi 2022 20:25 )  pH: 7.38  /  pCO2: 33    /  pO2: 122   / HCO3: 20    / Base Excess: -4.9  /  SaO2: 98.5        [N/A] Extubation Readiness Assessed      CARDIOVASCULAR  HR: 61 (06-15-22 @ 23:00) (59 - 77)  BP: 170/70 (06-15-22 @ 19:23) (170/70 - 170/70)  BP(mean): 101 (06-15-22 @ 19:23) (101 - 101)  ABP: 143/44 (06-15-22 @ 23:00) (125/34 - 178/55)  ABP(mean): 81 (06-15-22 @ 23:00) (68 - 103)      Exam: regular rate and rhythm  Cardiac Rhythm: sinus  Perfusion     [x]Adequate   [ ]Inadequate  Mentation   [x]Normal       [ ]Reduced  Extremities  [x]Warm         [ ]Cool  Volume Status [ ]Hypervolemic [x]Euvolemic [ ]Hypovolemic  Meds:       GI/NUTRITION  Exam: soft, nontender, nondistended, incision C/D/I  Diet: carb consistent diet  Meds: pantoprazole    Tablet 40 milliGRAM(s) Oral before breakfast  polyethylene glycol 3350 17 Gram(s) Oral daily  senna 2 Tablet(s) Oral at bedtime      GENITOURINARY  I&O's Detail    06-14 @ 07:01  -  06-15 @ 07:00  --------------------------------------------------------  IN:    IV PiggyBack: 150 mL    IV PiggyBack: 250 mL    Oral Fluid: 860 mL    sodium chloride 0.45%: 1050 mL    sodium chloride 0.9%: 75 mL  Total IN: 2385 mL    OUT:    Chest Tube (mL): 185 mL    Indwelling Catheter - Urethral (mL): 1995 mL  Total OUT: 2180 mL    Total NET: 205 mL      06-15 @ 07:01  -  06-16 @ 00:29  --------------------------------------------------------  IN:    IV PiggyBack: 150 mL    Oral Fluid: 360 mL    sodium chloride 0.45%: 400 mL  Total IN: 910 mL    OUT:    Chest Tube (mL): 0 mL    Chest Tube (mL): 160 mL    Indwelling Catheter - Urethral (mL): 1030 mL  Total OUT: 1190 mL    Total NET: -280 mL        Weight (kg): 61.7 (06-15 @ 07:24)  06-15    140  |  111<H>  |  47<H>  ----------------------------<  299<H>  5.4<H>   |  19<L>  |  1.68<H>    Ca    8.4      15 Raffi 2022 20:37  Phos  4.0     06-15  Mg     2.2     06-15    TPro  5.9<L>  /  Alb  2.4<L>  /  TBili  0.2  /  DBili  x   /  AST  20  /  ALT  12  /  AlkPhos  88  06-15    [ ] Miranda catheter, indication: N/A  Meds: magnesium oxide 400 milliGRAM(s) Oral every 8 hours  multivitamin 1 Tablet(s) Oral daily        HEMATOLOGIC  Meds:   [x] VTE Prophylaxis                        8.8    6.96  )-----------( 190      ( 15 Rafif 2022 11:22 )             27.5     PT/INR - ( 15 Raffi 2022 00:23 )   PT: 18.2 sec;   INR: 1.58 ratio         PTT - ( 15 Raffi 2022 00:23 )  PTT:33.0 sec  Transfusion     [ ] PRBC   [ ] Platelets   [ ] FFP   [ ] Cryoprecipitate      INFECTIOUS DISEASES  WBC Count: 6.96 K/uL (06-15 @ 11:22)    RECENT CULTURES:  Specimen Source: .Body Fluid Pleural Fluid  Date/Time: 06-11 @ 02:42  Culture Results:   No growth  Gram Stain:   Few polymorphonuclear leukocytes seen  No organisms seen  Organism: --    Meds: ertapenem  IVPB 1000 milliGRAM(s) IV Intermittent every 24 hours  fluconAZOLE   Tablet 200 milliGRAM(s) Oral daily  mycophenolate mofetil 500 milliGRAM(s) Oral <User Schedule>  tacrolimus 0.5 milliGRAM(s) Oral <User Schedule>  trimethoprim   80 mG/sulfamethoxazole 400 mG 1 Tablet(s) Oral <User Schedule>  valGANciclovir 450 milliGRAM(s) Oral <User Schedule>        ENDOCRINE  CAPILLARY BLOOD GLUCOSE      POCT Blood Glucose.: 290 mg/dL (15 Raffi 2022 21:15)  POCT Blood Glucose.: 228 mg/dL (15 Raffi 2022 17:21)  POCT Blood Glucose.: 222 mg/dL (15 Raffi 2022 07:36)    Meds: atorvastatin 40 milliGRAM(s) Oral at bedtime  insulin glargine Injectable (LANTUS) 4 Unit(s) SubCutaneous once  insulin glargine Injectable (LANTUS) 10 Unit(s) SubCutaneous at bedtime  insulin lispro (ADMELOG) corrective regimen sliding scale   SubCutaneous three times a day before meals  levothyroxine 50 MICROGram(s) Oral daily  predniSONE   Tablet 5 milliGRAM(s) Oral daily        ACCESS DEVICES:  [x ] Peripheral IV  [ ] Central Venous Line	[ ] R	[ ] L	[ ] IJ	[ ] Fem	[ ] SC	Placed:   [x ] Arterial Line		[ x] R	[ ] L	[ ] Fem	[x ] Rad	[ ] Ax	Placed: 6/11  [ ] PICC:					[ ] Mediport  [x ] Urinary Catheter, Date Placed:   [x] Necessity of urinary, arterial, and venous catheters discussed    OTHER MEDICATIONS:  chlorhexidine 2% Cloths 1 Application(s) Topical <User Schedule>  sodium zirconium cyclosilicate 5 Gram(s) Oral once      CODE STATUS:  full code

## 2022-06-16 NOTE — PROGRESS NOTE ADULT - PROBLEM SELECTOR PLAN 1
sp  rt vats drainage hemothorax  2 chest lws      number 2  tube minimal drainage   will dw  Dr philip plan   for tubes,    Daily XRay  while tubes in place         Care as per SICU team

## 2022-06-16 NOTE — PROGRESS NOTE ADULT - SUBJECTIVE AND OBJECTIVE BOX
Transplant Surgery - Consult Note  --------------------------------------------------------------  DCD DDRT     Date: 4/21/22    Present:   Patient seen and examined with multidisciplinary team including Transplant Surgeon: Dr. David, Transplant Nephrologist: Dr. Kindra PRINCE,  nephrology fellow Dr. Pantoja, transplant pharmacist VENICE Lim, NP Jose and unit RN during am rounds.  Disciplines not in attendance will be notified of the plan.     HPI: 66 yr old man with PMH: DM type II, HTN, CAD s/p CABG in 2020, Afib on Eliquis, CVA in 2019 due to Afib, Seizure d/o following CVA last episode was on 4/8/22, h/o GI bleed in 2/2022 EGD with duodenal ulcer.  ESRD due to DM was on HD since 2019, s/p DCD DDRT on 4/21/22.  Donor was 58 , KDPI 82%, DCD, single vessels and ureter, HLA mismatch 1, 2, 2. No DSA, cPRA 0%. 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 Was Readmitted in May for anemia in the setting of large perinephric hematoma s/p evacuation and repair of arterial anastomosis on 5/2/22. Intra operative biopsy showed no rejection, Creatinine ranging ~2mg/dL.  In Rehab: He was recently dx with klebsiella UTI rx with ertapenem - then switched to Levaquin day #6.    He also had parainfluenza pneumonia. Was at rehab progressing well.      Hospital course:  - 6/10: transferred from rehab facility for seizure status epilepticus. Intubated for respiratory protection and transferred to MICU. EEG done no seizure activity recorded.  Neuro following/adjusting meds  - 6/11: Extubated. Found to have pleural effusion on eliquis Underwent Thoracentesis 1.3L. eliquis changed to heparin drip, post procedure drop in H&H. 5u PRBC, 2 u FFP.    - 6/11 evening Transferred to SICU for further care in setting of hypoxia, significant R pleural effusion, hgb 6 and hemodynamic instability  - 6/11 Intubated at 9pm and sedated on Precedex.  CT placed, 600ml blood drained.  1L total overnight, started pressors  - 6/11 Received Protamine for PTT >100 (was on Heparin drip started for AF), 2 u FFP and 5u PRBC total    Interval Events:  - S/P VATS 2.2L old blood removed.  Additional CT placed  - CXR improving. On RA  - Insulin drip started  - Shifted for hyperkalemia  - SCr 1.7  UOP 1.7L  CT #1 350ml  CT #2 5ml  - VSS/Afebrile  - Diet advanced/ IVF Dcd       Immunosuppression:   Induction:  Thymoglobulin                                        Maintenance immunosuppression: Tacrolimus HELD, MMF 500mg BID, Pred 5  Ongoing monitoring for signs of rejection.    Potential Discharge date: pending clinical improvement    Education:  Medications    Plan of care:  See Below      MEDICATIONS  (STANDING):  atorvastatin 40 milliGRAM(s) Oral at bedtime  chlorhexidine 2% Cloths 1 Application(s) Topical <User Schedule>  ertapenem  IVPB 1000 milliGRAM(s) IV Intermittent every 24 hours  fluconAZOLE   Tablet 200 milliGRAM(s) Oral daily  insulin lispro (ADMELOG) corrective regimen sliding scale   SubCutaneous three times a day before meals  insulin lispro (ADMELOG) corrective regimen sliding scale   SubCutaneous at bedtime  insulin lispro Injectable (ADMELOG) 5 Unit(s) SubCutaneous three times a day before meals  levETIRAcetam  IVPB 250 milliGRAM(s) IV Intermittent every 12 hours  levothyroxine 50 MICROGram(s) Oral daily  magnesium oxide 400 milliGRAM(s) Oral every 8 hours  multivitamin 1 Tablet(s) Oral daily  mycophenolate mofetil 500 milliGRAM(s) Oral <User Schedule>  NIFEdipine XL 30 milliGRAM(s) Oral daily  pantoprazole    Tablet 40 milliGRAM(s) Oral before breakfast  polyethylene glycol 3350 17 Gram(s) Oral daily  predniSONE   Tablet 5 milliGRAM(s) Oral daily  senna 2 Tablet(s) Oral at bedtime  sodium bicarbonate 1300 milliGRAM(s) Oral three times a day  valGANciclovir 450 milliGRAM(s) Oral <User Schedule>  valproate sodium  IVPB 750 milliGRAM(s) IV Intermittent every 8 hours    MEDICATIONS  (PRN):  acetaminophen     Tablet .. 975 milliGRAM(s) Oral every 6 hours PRN Mild Pain (1 - 3)  oxyCODONE    IR 2.5 milliGRAM(s) Oral every 4 hours PRN mod/sever pain      PAST MEDICAL & SURGICAL HISTORY:  Hypertension      Diabetes      Dyslipidemia      CAD (Coronary Artery Disease)  with Stents in 06/2009 and 6/2019, s/p off-pump C3L on 8/13/20      Hypothyroidism      CVA (cerebral vascular accident)  12/13/19 with residual bilateral weakness      Anemia      PEG (percutaneous endoscopic gastrostomy) status  removed July 2020      Intubation of airway performed without difficulty  Dec 2019  CVA c/b status epilepticus requiring intubation and PEG in 12/2019-      ESRD on dialysis  M/W/F      Seizures  after CVA, last seizure was last week 4/07/22      History of insertion of stent into coronary artery bypass graft  2009 and 2019      S/P CABG x 3  off pump C3L on 8/13/20          Vital Signs Last 24 Hrs  T(C): 36.7 (16 Jun 2022 11:00), Max: 36.7 (16 Jun 2022 11:00)  T(F): 98.1 (16 Jun 2022 11:00), Max: 98.1 (16 Jun 2022 11:00)  HR: 71 (16 Jun 2022 15:00) (61 - 79)  BP: 134/66 (16 Jun 2022 15:00) (134/66 - 170/70)  BP(mean): 94 (16 Jun 2022 15:00) (94 - 101)  RR: 11 (16 Jun 2022 15:00) (9 - 31)  SpO2: 100% (16 Jun 2022 15:00) (96% - 100%)    I&O's Summary    15 Raffi 2022 07:01  -  16 Jun 2022 07:00  --------------------------------------------------------  IN: 1169 mL / OUT: 2145 mL / NET: -976 mL    16 Jun 2022 07:01  -  16 Jun 2022 17:54  --------------------------------------------------------  IN: 199.5 mL / OUT: 735 mL / NET: -535.5 mL                              9.0    6.75  )-----------( 195      ( 16 Jun 2022 05:56 )             28.7     06-16    143  |  112<H>  |  49<H>  ----------------------------<  226<H>  5.0   |  20<L>  |  1.70<H>    Ca    9.4      16 Jun 2022 11:24  Phos  4.2     06-16  Mg     2.2     06-16    TPro  5.5<L>  /  Alb  2.2<L>  /  TBili  0.2  /  DBili  x   /  AST  15  /  ALT  9<L>  /  AlkPhos  83  06-16    Tacrolimus (), Serum: 11.1 ng/mL (06-16 @ 05:56)        Culture - Fungal, Body Fluid (collected 06-11-22 @ 02:42)  Source: .Body Fluid Pleural Fluid  Preliminary Report (06-13-22 @ 10:21):    Testing in progress    Culture - Body Fluid with Gram Stain (collected 06-11-22 @ 02:42)  Source: .Body Fluid Pleural Fluid  Gram Stain (06-11-22 @ 12:29):    Few polymorphonuclear leukocytes seen    No organisms seen  Final Report (06-16-22 @ 12:25):    No growth at 5 days    Culture - Blood (collected 06-10-22 @ 18:15)  Source: .Blood Blood  Final Report (06-16-22 @ 01:00):    No Growth Final    Culture - Blood (collected 06-10-22 @ 18:00)  Source: .Blood Blood  Final Report (06-16-22 @ 01:00):    No Growth Final       REVIEW OF SYSTEMS  --------------------------------------------------------------------------------  Gen: No weight changes, fatigue, fevers/chills, weakness  Skin: No rashes  Head/Eyes/Ears/Mouth: No headache; Normal hearing; Normal vision w/o blurriness; No sinus pain/discomfort, sore throat  Respiratory: No dyspnea, cough, wheezing, hemoptysis. Some pain at CT sites  CV: No chest pain, PND, orthopnea  GI: No abdominal pain, diarrhea, constipation, nausea, vomiting, melena, hematochezia  : No increased frequency, dysuria, hematuria, nocturia  MSK: No joint pain/swelling; no back pain; no edema  Neuro: No dizziness/lightheadedness, weakness, seizures, numbness, tingling  Heme: No easy bruising or bleeding  Endo: No heat/cold intolerance  Psych: No significant nervousness, anxiety, stress, depression  All other systems were reviewed and are negative, except as noted.        PHYSICAL EXAM:  Constitutional: Well developed / well nourished  Eyes: Anicteric, PERRLA  ENMT: nc/at  Neck: supple  Respiratory: CTA anterior. R CT x2 to sxn with thin bloody drainage  Cardiovascular: RRR  Gastrointestinal: Soft, non distended, NT, incision healed   Genitourinary: tucker  Extremities: SCD's in place and working bilaterally, no edema  Vascular: Palpable dp pulses bilaterally  Neurological: A&O x3  Skin: no rashes, ulcerations or lesions  Musculoskeletal: Moving all extremities  Psychiatric: Responsive

## 2022-06-17 NOTE — PROGRESS NOTE ADULT - NS ATTEND AMEND GEN_ALL_CORE FT
s/p DDRT with good function, complicated post-op course as above  Immunosuppression - Start prograf 0.5mg bid; cont prednisone 5mg daily, cellcept 500mg bid  seizures- keppra and depakote as per neurology  hemothorax, acute blood loss anemia stable without active bleeding, cont chest tubes to water seal  transfer to floor

## 2022-06-17 NOTE — PROGRESS NOTE ADULT - SUBJECTIVE AND OBJECTIVE BOX
24 HOUR EVENTS:        HISTORY  Patient is a 67 y/o male w/ a PMHx of CAD s/p CABG (2020), AF on Eliquis c/b CVA (2019) c/b seizures, HTN, HLD, DM type II, hypothyroidism, GI bleed due to a duodenal ulcer (2/2022), and ESRD s/p DDRT (4/21/22) c/b DGF requiring HD (last session 4/29/22) & large perinephric hematoma s/p evacuation w/ revision of arterial anastomosis (5/2/22) who presented on 6/10 for status epilepticus. Patient was intubated for airway protection and transferred to MICU for further management. Patient was loaded w/ valproic acid and continued his home Keppra w/ resolution of his seizures on EEG and patient was extubated on 6/11. Patient was noted to have a large right pleural effusion, thoracentesis was performed w/ 1.3 L of serosanguineous fluid . Patient subsequenlty started on a heparin infusion for his atrial fibrillation post-procedure but had a H&H drop w/ CXR demonstrating complete right lung whiteout concerning for a hemothorax. Patient was transferred to SICU for further management. He was intubated for airway protection again, transfused with blood products, and reversed w/ protamine. A right 28 Fr chest tube was placed w/ a significant amount of sanguineous fluid drained. 6/15 Patient went to OR with thoracic for Right VATS and second chest tube placement. Post-procedure patient extubated and transferred back to SICU for further hemodynamic monitoring.      SUBJECTIVE/ROS:  [z ] A ten-point review of systems was otherwise negative except as noted.  [ ] Due to altered mental status/intubation, subjective information were not able to be obtained from the patient. History was obtained, to the extent possible, from review of the chart and collateral sources of information.      NEURO  Exam: lethargic, opens eyes to commands, no focal deficit  Meds: acetaminophen     Tablet .. 975 milliGRAM(s) Oral every 6 hours PRN Mild Pain (1 - 3)  levETIRAcetam  IVPB 250 milliGRAM(s) IV Intermittent every 12 hours  oxyCODONE    IR 2.5 milliGRAM(s) Oral every 4 hours PRN mod/sever pain  valproate sodium  IVPB 750 milliGRAM(s) IV Intermittent every 8 hours    [x] Adequacy of sedation and pain control has been assessed and adjusted      RESPIRATORY  RR: 22 (06-16-22 @ 23:00) (9 - 31)  SpO2: 100% (06-16-22 @ 23:00) (96% - 100%)  Exam: unlabored, clear to auscultation bilaterally  ABG - ( 16 Jun 2022 05:38 )  pH: 7.35  /  pCO2: 38    /  pO2: 85    / HCO3: 21    / Base Excess: -4.2  /  SaO2: 96.6        [N/A] Extubation Readiness Assessed      CARDIOVASCULAR  HR: 72 (06-16-22 @ 23:00) (63 - 79)  BP: 160/75 (06-16-22 @ 23:00) (134/66 - 169/79)  BP(mean): 108 (06-16-22 @ 23:00) (94 - 114)  ABP: 155/53 (06-16-22 @ 14:00) (150/52 - 172/58)  ABP(mean): 86 (06-16-22 @ 14:00) (86 - 100)        Exam: regular rate and rhythm  Cardiac Rhythm: sinus  Perfusion     [x]Adequate   [ ]Inadequate  Mentation   [x]Normal       [ ]Reduced  Extremities  [x]Warm         [ ]Cool  Volume Status [ ]Hypervolemic [x]Euvolemic [ ]Hypovolemic  Meds: NIFEdipine XL 30 milliGRAM(s) Oral daily        GI/NUTRITION  Exam: soft, nontender, nondistended, incision C/D/I  Diet: reg diet  Meds: pantoprazole    Tablet 40 milliGRAM(s) Oral before breakfast  polyethylene glycol 3350 17 Gram(s) Oral daily  senna 2 Tablet(s) Oral at bedtime      GENITOURINARY  I&O's Detail    06-15 @ 07:01  -  06-16 @ 07:00  --------------------------------------------------------  IN:    Insulin: 9 mL    IV PiggyBack: 400 mL    Oral Fluid: 360 mL    sodium chloride 0.45%: 400 mL  Total IN: 1169 mL    OUT:    Chest Tube (mL): 350 mL    Chest Tube (mL): 5 mL    Indwelling Catheter - Urethral (mL): 1790 mL  Total OUT: 2145 mL    Total NET: -976 mL      06-16 @ 07:01  -  06-17 @ 01:45  --------------------------------------------------------  IN:    Insulin: 49.5 mL    IV PiggyBack: 100 mL    IV PiggyBack: 100 mL    Oral Fluid: 200 mL  Total IN: 449.5 mL    OUT:    Chest Tube (mL): 10 mL    Chest Tube (mL): 100 mL    Indwelling Catheter - Urethral (mL): 335 mL    Voided (mL): 800 mL  Total OUT: 1245 mL    Total NET: -795.5 mL          06-16    138  |  107  |  52<H>  ----------------------------<  188<H>  4.8   |  21<L>  |  1.82<H>    Ca    8.5      16 Jun 2022 22:14  Phos  4.2     06-16  Mg     2.2     06-16    TPro  5.5<L>  /  Alb  2.2<L>  /  TBili  0.2  /  DBili  x   /  AST  15  /  ALT  9<L>  /  AlkPhos  83  06-16    [ ] Miranda catheter, indication: N/A  Meds: magnesium oxide 400 milliGRAM(s) Oral every 8 hours  multivitamin 1 Tablet(s) Oral daily  sodium bicarbonate 1300 milliGRAM(s) Oral three times a day        HEMATOLOGIC  Meds:   [x] VTE Prophylaxis                        9.0    6.75  )-----------( 195      ( 16 Jun 2022 05:56 )             28.7     PT/INR - ( 16 Jun 2022 05:56 )   PT: 15.7 sec;   INR: 1.35 ratio         PTT - ( 16 Jun 2022 05:56 )  PTT:31.9 sec  Transfusion     [ ] PRBC   [ ] Platelets   [ ] FFP   [ ] Cryoprecipitate      INFECTIOUS DISEASES  WBC Count: 6.75 K/uL (06-16 @ 05:56)    RECENT CULTURES:    Meds: ertapenem  IVPB 1000 milliGRAM(s) IV Intermittent every 24 hours  fluconAZOLE   Tablet 200 milliGRAM(s) Oral daily  mycophenolate mofetil 500 milliGRAM(s) Oral <User Schedule>  valGANciclovir 450 milliGRAM(s) Oral <User Schedule>        ENDOCRINE  CAPILLARY BLOOD GLUCOSE      POCT Blood Glucose.: 176 mg/dL (16 Jun 2022 21:12)  POCT Blood Glucose.: 104 mg/dL (16 Jun 2022 17:09)  POCT Blood Glucose.: 99 mg/dL (16 Jun 2022 16:11)  POCT Blood Glucose.: 129 mg/dL (16 Jun 2022 15:08)  POCT Blood Glucose.: 151 mg/dL (16 Jun 2022 14:17)  POCT Blood Glucose.: 119 mg/dL (16 Jun 2022 13:05)  POCT Blood Glucose.: 184 mg/dL (16 Jun 2022 12:04)  POCT Blood Glucose.: 230 mg/dL (16 Jun 2022 11:02)  POCT Blood Glucose.: 246 mg/dL (16 Jun 2022 10:06)  POCT Blood Glucose.: 219 mg/dL (16 Jun 2022 09:08)  POCT Blood Glucose.: 233 mg/dL (16 Jun 2022 08:02)  POCT Blood Glucose.: 293 mg/dL (16 Jun 2022 06:58)  POCT Blood Glucose.: 301 mg/dL (16 Jun 2022 05:56)  POCT Blood Glucose.: 327 mg/dL (16 Jun 2022 04:37)    Meds: atorvastatin 40 milliGRAM(s) Oral at bedtime  insulin glargine Injectable (LANTUS) 32 Unit(s) SubCutaneous at bedtime  insulin lispro (ADMELOG) corrective regimen sliding scale   SubCutaneous three times a day before meals  insulin lispro (ADMELOG) corrective regimen sliding scale   SubCutaneous at bedtime  insulin lispro Injectable (ADMELOG) 5 Unit(s) SubCutaneous three times a day before meals  levothyroxine 50 MICROGram(s) Oral daily  predniSONE   Tablet 5 milliGRAM(s) Oral daily        ACCESS DEVICES:  [x ] Peripheral IV  [ ] Central Venous Line	[ ] R	[ ] L	[ ] IJ	[ ] Fem	[ ] SC	Placed:   [ ] Arterial Line		[ ] R	[ ] L	[ ] Fem	[ ] Rad	[ ] Ax	Placed:   [ ] PICC:					[ ] Mediport  [ ] Urinary Catheter, Date Placed:   [x] Necessity of urinary, arterial, and venous catheters discussed    OTHER MEDICATIONS:  chlorhexidine 2% Cloths 1 Application(s) Topical <User Schedule>      CODE STATUS:    full code     24 HOUR EVENTS:  - patient transitioned from insulin drip to 32unit lantus and 5 units premeal admelog  -patient was hypertensive, started on nifedipine 30mg  - d/c bactrim and started on bicarbonate orally for hyperkalemia  -both chest tubes to waterseal, CXR done no pneumothorax noted        HISTORY  Patient is a 67 y/o male w/ a PMHx of CAD s/p CABG (2020), AF on Eliquis c/b CVA (2019) c/b seizures, HTN, HLD, DM type II, hypothyroidism, GI bleed due to a duodenal ulcer (2/2022), and ESRD s/p DDRT (4/21/22) c/b DGF requiring HD (last session 4/29/22) & large perinephric hematoma s/p evacuation w/ revision of arterial anastomosis (5/2/22) who presented on 6/10 for status epilepticus. Patient was intubated for airway protection and transferred to MICU for further management. Patient was loaded w/ valproic acid and continued his home Keppra w/ resolution of his seizures on EEG and patient was extubated on 6/11. Patient was noted to have a large right pleural effusion, thoracentesis was performed w/ 1.3 L of serosanguineous fluid . Patient subsequenlty started on a heparin infusion for his atrial fibrillation post-procedure but had a H&H drop w/ CXR demonstrating complete right lung whiteout concerning for a hemothorax. Patient was transferred to SICU for further management. He was intubated for airway protection again, transfused with blood products, and reversed w/ protamine. A right 28 Fr chest tube was placed w/ a significant amount of sanguineous fluid drained. 6/15 Patient went to OR with thoracic for Right VATS and second chest tube placement. Post-procedure patient extubated and transferred back to SICU for further hemodynamic monitoring.      SUBJECTIVE/ROS:  [z ] A ten-point review of systems was otherwise negative except as noted.  [ ] Due to altered mental status/intubation, subjective information were not able to be obtained from the patient. History was obtained, to the extent possible, from review of the chart and collateral sources of information.      NEURO  Exam: lethargic, opens eyes to commands, no focal deficit  Meds: acetaminophen     Tablet .. 975 milliGRAM(s) Oral every 6 hours PRN Mild Pain (1 - 3)  levETIRAcetam  IVPB 250 milliGRAM(s) IV Intermittent every 12 hours  oxyCODONE    IR 2.5 milliGRAM(s) Oral every 4 hours PRN mod/sever pain  valproate sodium  IVPB 750 milliGRAM(s) IV Intermittent every 8 hours    [x] Adequacy of sedation and pain control has been assessed and adjusted      RESPIRATORY  RR: 22 (06-16-22 @ 23:00) (9 - 31)  SpO2: 100% (06-16-22 @ 23:00) (96% - 100%)  Exam: unlabored, clear to auscultation bilaterally  ABG - ( 16 Jun 2022 05:38 )  pH: 7.35  /  pCO2: 38    /  pO2: 85    / HCO3: 21    / Base Excess: -4.2  /  SaO2: 96.6        [N/A] Extubation Readiness Assessed      CARDIOVASCULAR  HR: 72 (06-16-22 @ 23:00) (63 - 79)  BP: 160/75 (06-16-22 @ 23:00) (134/66 - 169/79)  BP(mean): 108 (06-16-22 @ 23:00) (94 - 114)  ABP: 155/53 (06-16-22 @ 14:00) (150/52 - 172/58)  ABP(mean): 86 (06-16-22 @ 14:00) (86 - 100)        Exam: regular rate and rhythm  Cardiac Rhythm: sinus  Perfusion     [x]Adequate   [ ]Inadequate  Mentation   [x]Normal       [ ]Reduced  Extremities  [x]Warm         [ ]Cool  Volume Status [ ]Hypervolemic [x]Euvolemic [ ]Hypovolemic  Meds: NIFEdipine XL 30 milliGRAM(s) Oral daily        GI/NUTRITION  Exam: soft, nontender, nondistended, incision C/D/I  Diet: reg diet  Meds: pantoprazole    Tablet 40 milliGRAM(s) Oral before breakfast  polyethylene glycol 3350 17 Gram(s) Oral daily  senna 2 Tablet(s) Oral at bedtime      GENITOURINARY  I&O's Detail    06-15 @ 07:01  -  06-16 @ 07:00  --------------------------------------------------------  IN:    Insulin: 9 mL    IV PiggyBack: 400 mL    Oral Fluid: 360 mL    sodium chloride 0.45%: 400 mL  Total IN: 1169 mL    OUT:    Chest Tube (mL): 350 mL    Chest Tube (mL): 5 mL    Indwelling Catheter - Urethral (mL): 1790 mL  Total OUT: 2145 mL    Total NET: -976 mL      06-16 @ 07:01  -  06-17 @ 01:45  --------------------------------------------------------  IN:    Insulin: 49.5 mL    IV PiggyBack: 100 mL    IV PiggyBack: 100 mL    Oral Fluid: 200 mL  Total IN: 449.5 mL    OUT:    Chest Tube (mL): 10 mL    Chest Tube (mL): 100 mL    Indwelling Catheter - Urethral (mL): 335 mL    Voided (mL): 800 mL  Total OUT: 1245 mL    Total NET: -795.5 mL          06-16    138  |  107  |  52<H>  ----------------------------<  188<H>  4.8   |  21<L>  |  1.82<H>    Ca    8.5      16 Jun 2022 22:14  Phos  4.2     06-16  Mg     2.2     06-16    TPro  5.5<L>  /  Alb  2.2<L>  /  TBili  0.2  /  DBili  x   /  AST  15  /  ALT  9<L>  /  AlkPhos  83  06-16    [ ] Miranda catheter, indication: N/A  Meds: magnesium oxide 400 milliGRAM(s) Oral every 8 hours  multivitamin 1 Tablet(s) Oral daily  sodium bicarbonate 1300 milliGRAM(s) Oral three times a day        HEMATOLOGIC  Meds:   [x] VTE Prophylaxis                        9.0    6.75  )-----------( 195      ( 16 Jun 2022 05:56 )             28.7     PT/INR - ( 16 Jun 2022 05:56 )   PT: 15.7 sec;   INR: 1.35 ratio         PTT - ( 16 Jun 2022 05:56 )  PTT:31.9 sec  Transfusion     [ ] PRBC   [ ] Platelets   [ ] FFP   [ ] Cryoprecipitate      INFECTIOUS DISEASES  WBC Count: 6.75 K/uL (06-16 @ 05:56)    RECENT CULTURES:    Meds: ertapenem  IVPB 1000 milliGRAM(s) IV Intermittent every 24 hours  fluconAZOLE   Tablet 200 milliGRAM(s) Oral daily  mycophenolate mofetil 500 milliGRAM(s) Oral <User Schedule>  valGANciclovir 450 milliGRAM(s) Oral <User Schedule>        ENDOCRINE  CAPILLARY BLOOD GLUCOSE      POCT Blood Glucose.: 176 mg/dL (16 Jun 2022 21:12)  POCT Blood Glucose.: 104 mg/dL (16 Jun 2022 17:09)  POCT Blood Glucose.: 99 mg/dL (16 Jun 2022 16:11)  POCT Blood Glucose.: 129 mg/dL (16 Jun 2022 15:08)  POCT Blood Glucose.: 151 mg/dL (16 Jun 2022 14:17)  POCT Blood Glucose.: 119 mg/dL (16 Jun 2022 13:05)  POCT Blood Glucose.: 184 mg/dL (16 Jun 2022 12:04)  POCT Blood Glucose.: 230 mg/dL (16 Jun 2022 11:02)  POCT Blood Glucose.: 246 mg/dL (16 Jun 2022 10:06)  POCT Blood Glucose.: 219 mg/dL (16 Jun 2022 09:08)  POCT Blood Glucose.: 233 mg/dL (16 Jun 2022 08:02)  POCT Blood Glucose.: 293 mg/dL (16 Jun 2022 06:58)  POCT Blood Glucose.: 301 mg/dL (16 Jun 2022 05:56)  POCT Blood Glucose.: 327 mg/dL (16 Jun 2022 04:37)    Meds: atorvastatin 40 milliGRAM(s) Oral at bedtime  insulin glargine Injectable (LANTUS) 32 Unit(s) SubCutaneous at bedtime  insulin lispro (ADMELOG) corrective regimen sliding scale   SubCutaneous three times a day before meals  insulin lispro (ADMELOG) corrective regimen sliding scale   SubCutaneous at bedtime  insulin lispro Injectable (ADMELOG) 5 Unit(s) SubCutaneous three times a day before meals  levothyroxine 50 MICROGram(s) Oral daily  predniSONE   Tablet 5 milliGRAM(s) Oral daily        ACCESS DEVICES:  [x ] Peripheral IV  [ ] Central Venous Line	[ ] R	[ ] L	[ ] IJ	[ ] Fem	[ ] SC	Placed:   [ ] Arterial Line		[ ] R	[ ] L	[ ] Fem	[ ] Rad	[ ] Ax	Placed:   [ ] PICC:					[ ] Mediport  [ ] Urinary Catheter, Date Placed:   [x] Necessity of urinary, arterial, and venous catheters discussed    OTHER MEDICATIONS:  chlorhexidine 2% Cloths 1 Application(s) Topical <User Schedule>      CODE STATUS:    full code

## 2022-06-17 NOTE — PROGRESS NOTE ADULT - ASSESSMENT
Patient is a 67 y/o male w/ a PMHx of CAD s/p CABG (2020), AF on Eliquis c/b CVA (2019) c/b seizures, HTN, HLD, DM type II, hypothyroidism, GI bleed due to a duodenal ulcer (2/2022), and ESRD s/p DDRT (4/21/22) c/b DGF requiring HD (last session 4/29/22) & large perinephric hematoma s/p evacuation w/ revision of arterial anastomosis (5/2/22) who presented on 6/10 for status epilepticus. Patient was intubated for airway protection and transferred to MICU for further management. resolution of his seizures on EEG and patient was extubated on 6/11. Patient was noted to have a large right pleural effusion, thoracentesis was performed w/ 1.3 L of serosanguineous fluid . Patient subsequenlty started on a heparin infusion for his atrial fibrillation post-procedure but had a H&H drop w/ CXR demonstrating complete right lung whiteout concerning for a hemothorax. Patient was transferred to SICU for further management. He was intubated for airway protection again, transfused with blood products, and reversed w/ protamine. A right 28 Fr chest tube was placed w/ a significant amount of sanguineous fluid drained. 6/15 Patient went to OR with thoracic for Right VATS and second chest tube placement. Post-procedure patient extubated and transferred back to SICU for further hemodynamic monitoring.        Neuro: sleepy  - Assess neurological status every 1hour for  -MS: opens eyes, follows commands, no focal deficit  - Multimodal pain control w/ tylenol and oxycodone 2.5mg  -if mental status continues to be sleepy, may d/c oxy and give morphine  -seizure: keppra 250mg q12h, valproate 750mg     Resp:  - Out of bed to chair, ambulate as tolerated, and incentive spirometry to prevent atelectasis  -keep sats >92%    CVS: hypotension during VATS  - off vasopressors  - BP goal MAP > 65 mmHg  - lactate : 0.6  -atorvastatin 40mg    GI:   - carb consistent diet, soft with thick liquids  - Bowel regimen with senna & Miralax  - Protonix home meds    Renal: ESRD s/p DDRT (4/21/22) c/b DGF requiring HD (last session 4/29/22) & large perinephric hematoma s/p evacuation w/ revision of arterial anastomosis (5/2/22)   -hyperkalemia post-op  -ekg unremarkable, given 5mg of lokelma   - Monitor I&Os and electrolytes  - replete electrolytes as needed   -mycophenolate 500mg twice a day and tacrolimus 0.5mg    Heme:  - Monitor CBC and coags  -  VTE prophylaxis: on hold as per primary team   -SCD's    ID:   - Monitor for clinical evidence of active infection  - Monitor WBC, temperature, and procalcitonin  - Antibiotcis: emp [id number] q24h, and fluconazole  - ppx ABX: bactrm and valganciclovir  -Cultures: NTD    Endo: hx of DM, Hypothyroidism  - ISS q6h , lantus 10units and extra 4u   - for HLD : atorvastatin 40mg bedtime  - for hypothyroidism : levothyroxine     GOC:   full code     Patient is a 65 y/o male w/ a PMHx of CAD s/p CABG (2020), AF on Eliquis c/b CVA (2019) c/b seizures, HTN, HLD, DM type II, hypothyroidism, GI bleed due to a duodenal ulcer (2/2022), and ESRD s/p DDRT (4/21/22) c/b DGF requiring HD (last session 4/29/22) & large perinephric hematoma s/p evacuation w/ revision of arterial anastomosis (5/2/22) who presented on 6/10 for status epilepticus. Patient was intubated for airway protection and transferred to MICU for further management. resolution of his seizures on EEG and patient was extubated on 6/11. Patient was noted to have a large right pleural effusion, thoracentesis was performed w/ 1.3 L of serosanguineous fluid . Patient subsequenlty started on a heparin infusion for his atrial fibrillation post-procedure but had a H&H drop w/ CXR demonstrating complete right lung whiteout concerning for a hemothorax. Patient was transferred to SICU for further management. He was intubated for airway protection again, transfused with blood products, and reversed w/ protamine. A right 28 Fr chest tube was placed w/ a significant amount of sanguineous fluid drained. 6/15 Patient went to OR with thoracic for Right VATS and second chest tube placement. Post-procedure patient extubated and transferred back to SICU for further hemodynamic monitoring.        Neuro: sleepy  -MS: opens eyes, follows commands, no focal deficit  - Multimodal pain control w/ tylenol and oxycodone 2.5mg to d/c since patient concern of lethargy  -if mental status continues to be sleepy, give morphine  -seizure: keppra 250mg q12h, valproate 750mg     Resp: s/p VATS evacuation of hemothorax  - Out of bed to chair, ambulate as tolerated, and incentive spirometry to prevent atelectasis  -keep sats >92%  -chest tube to water seal, no pneumothorax noted    CVS: hypotension during VATS, today hypertensive  -started on nifedipine 30mg daily  - off vasopressors  - BP goal MAP > 65 mmHg  - lactate : 0.6  -atorvastatin 40mg    GI:   - carb consistent diet, soft with thick liquids  - Bowel regimen with senna & Miralax  - Protonix home meds    Renal: ESRD s/p DDRT (4/21/22) c/b DGF requiring HD (last session 4/29/22) & large perinephric hematoma s/p evacuation w/ revision of arterial anastomosis (5/2/22)   -hyperkalemia post-op  -ekg unremarkable, given 5mg of lokelma   - d/c bactrime and started on bicarbonate 1300mg tid  - Monitor I&Os and electrolytes  - replete electrolytes as needed   -mycophenolate 500mg twice a day and tacrolimus 0.5mg    Heme:  - Monitor CBC and coags  -  VTE prophylaxis: on hold as per primary team   -SCD's    ID: [id number]  - Monitor for clinical evidence of active infection  - Monitor WBC, temperature, and procalcitonin  - Antibiotcis: emp [id number] q24h, and fluconazole  - ppx ABX: bactrm and valganciclovir  -Cultures: NTD    Endo: hx of DM, Hypothyroidism  - ISS q6h , lantus 32units and 5units premeal admelog  - for HLD : atorvastatin 40mg bedtime  - for hypothyroidism : levothyroxine     GOC:   full code

## 2022-06-17 NOTE — CHART NOTE - NSCHARTNOTEFT_GEN_A_CORE
Nutrition Follow Up Note  Patient seen for: malnutrition follow up on 8ICU (h/o DDRT 22)    Chart reviewed, events noted. "66 year old Male with PMHx ESRD s/p permacath removal 5/10/22 s/p Rt DDRT 22,  CVA (), Afib on apixaban, seizure activity, CAD s/p stents, CABG (), HTN, HLD, DM on insulin, gastric/duodenal ulcer, recent hospitalization 22 -5/10/22 with weakness/anemia found to have perinephric hematoma requiring evacuation and repair of bleeding arterial anastomosis who presented to [facility] for status epilepticus requiring intubation for hypoxic respiratory failure."    Interim Events:  - Intubated  s/p thoracentesis (1300ml removed); extubated   - s/p VATS 6/15  - Insulin drip discontinued     Source: [] Patient       [x] EMR        [] RN        [] Family at bedside       [] Other: Per chart, A&O 2-3, lethargic; William speaking    Diet Order:   Diet, Regular:   Consistent Carbohydrate {Evening Snack} (CSTCHOSN)  Soft and Bite Sized (SOFTBTSZ)  Mildly Thick Liquids (MILDTHICKLIQS)  Supplement Feeding Modality:  Oral  Ensure Pudding Cans or Servings Per Day:  1       Frequency:  Three Times a day (06-15-22)    - Is current order appropriate/adequate? [X] Yes    Diet History:  6/10-: NPO  : diet advanced, assisted with feeding    PO intake :   [] >75%  Adequate    [] 50-75%  Fair       [] <50%  Poor [meal rounds pending]    Nutrition-related concerns:  - Renal (per chart): "ESRD s/p DDRT (22) c/b DGF requiring HD (last session 22) & large perinephric hematoma s/p evacuation w/ revision of arterial anastomosis (22)"  - Post-op hyperkalemia improved; sodium bicarbonate ordered 1300mg tid   - Hyperglycemia (DM2, HbA1c 6.6%) addressed with Lantus (32 units bedtime), Admelog (5 units tid before meals), and SSI before meals/bedtime  - Electrolyte supplementation: magnesium sulfate, sodium phosphate; multivitamin ordered    GI:    Last BM 6/15 (x2).   Bowel Regimen? [X] Yes: Miralax, senna, Mag-Ox    Weights:   Daily Weight in k.1 (), Weight in k.3 (), Weight in k.9 (), Weight in k.5 (), Weight in k.7 (06-10)  DOSIN.6 kg   IBW: 75.2 kg    Nutritionally Pertinent MEDICATIONS  (STANDING):  atorvastatin  ertapenem  IVPB  fluconAZOLE   Tablet  insulin glargine Injectable (LANTUS)  insulin lispro (ADMELOG) corrective regimen sliding scale  insulin lispro (ADMELOG) corrective regimen sliding scale  insulin lispro Injectable (ADMELOG)  levothyroxine  magnesium oxide  magnesium sulfate  IVPB  multivitamin  NIFEdipine XL  pantoprazole    Tablet  polyethylene glycol 3350  predniSONE   Tablet  senna  sodium bicarbonate  sodium phosphate IVPB  valGANciclovir    Pertinent Labs:  @ 06:00: Na 136, BUN 50<H>, Cr 1.87<H>, <H>, K+ 4.5, Phos 2.9, Mg 1.8, Alk Phos 77, ALT/SGPT 10, AST/SGOT 18,    @ 22:14: Na 138, BUN 52<H>, Cr 1.82<H>, <H>, K+ 4.8,   @ 11:24: Na 143, BUN 49<H>, Cr 1.70<H>, <H>, K+ 5.0,    A1C with Estimated Average Glucose Result: 6.6 % (22 @ 17:12)  A1C with Estimated Average Glucose Result: 7.3 % (22 @ 13:42)  A1C with Estimated Average Glucose Result: 7.2 % (22 @ 05:48)    Finger Sticks:  POCT Blood Glucose.: 123 mg/dL ( @ 08:15)  POCT Blood Glucose.: 176 mg/dL ( @ 21:12)  POCT Blood Glucose.: 104 mg/dL ( @ 17:09)  POCT Blood Glucose.: 99 mg/dL ( @ 16:11)  POCT Blood Glucose.: 129 mg/dL ( @ 15:08)  POCT Blood Glucose.: 151 mg/dL ( @ 14:17)  POCT Blood Glucose.: 119 mg/dL ( @ 13:05)  POCT Blood Glucose.: 184 mg/dL ( @ 12:04)  POCT Blood Glucose.: 230 mg/dL ( @ 11:02)  POCT Blood Glucose.: 246 mg/dL ( @ 10:06)  POCT Blood Glucose.: 219 mg/dL ( @ 09:08)    Skin per nursing documentation: stage II sacrum (present on admission)  Edema: 2+ generalized, dependent    Estimated Needs: based on dosing wt 61.7kg, with consideration for malnutrition, BMI 19.5 kg/m2  Energy: 5905-2389 calories (30-35 kcal/kg)  Protein: 74-92 grams (1.2-1.5 gm/kg)    Previous Nutrition Diagnosis: Severe Malnutrition  Nutrition Diagnosis is: [X] ongoing; addressed with therapeutic diet and oral supplements    New Nutrition Diagnosis: Increased Nutrient Needs (protein, micronutrients) related to increased physiologic demand of stress factor and wound healing as evidenced by stage II pressure injury present on admission    Nutrition Care Plan:  [X] In Progress  [] Achieved  [] Not applicable    Nutrition Interventions:     Education Provided:       [] Yes:  [X] No:        Recommendations:         [X] Continue current diet order: Consistent Carbohydrate diet with snack; Soft and Bite Sized, with 3 servings Ensure Pudding      [X] Add oral nutrition supplement: Nectar Consistency No Sugar Added Mighty Shake supplement  (200 kcal, 7 g protein) added to meal trays.      [X] Continue current micronutrient supplementation: multivitamin, electrolyte repletion prn    Monitoring and Evaluation:   Continue to monitor nutritional intake, tolerance to diet prescription, weights, labs, skin integrity      RD remains available upon request and will follow up per protocol  Wendie Lazaro MS RD CDN [facility] (pager [phone]) Nutrition Follow Up Note  Patient seen for: malnutrition follow up on 8ICU (h/o DDRT 22)    Chart reviewed, events noted. "66 year old Male with PMHx ESRD s/p permacath removal 5/10/22 s/p Rt DDRT 22,  CVA (), Afib on apixaban, seizure activity, CAD s/p stents, CABG (), HTN, HLD, DM on insulin, gastric/duodenal ulcer, recent hospitalization 22 -5/10/22 with weakness/anemia found to have perinephric hematoma requiring evacuation and repair of bleeding arterial anastomosis who presented to [facility] for status epilepticus requiring intubation for hypoxic respiratory failure."    Interim Events:  - Intubated  s/p thoracentesis (1300ml removed); extubated   - s/p VATS 6/15  - Insulin drip discontinued     Source: [] Patient       [x] EMR        [] RN        [] Family at bedside       [] Other: Per chart, A&O 2-3, lethargic, asleep at time of visit; William speaking    Diet Order:   Diet, Regular:   Consistent Carbohydrate {Evening Snack} (CSTCHOSN)  Soft and Bite Sized (SOFTBTSZ)  Mildly Thick Liquids (MILDTHICKLIQS)  Supplement Feeding Modality:  Oral  Ensure Pudding Cans or Servings Per Day:  1       Frequency:  Three Times a day (06-15-22)    - Is current order appropriate/adequate? [X] Yes    Diet History:  6/10-: NPO  : diet advanced, assisted with feeding    PO intake :   unable to assess; RD will follow up as able    Nutrition-related concerns:  - Renal (per chart): "ESRD s/p DDRT (22) c/b DGF requiring HD (last session 22) & large perinephric hematoma s/p evacuation w/ revision of arterial anastomosis (22)"  - Post-op hyperkalemia improved; sodium bicarbonate ordered 1300mg tid   - Hyperglycemia (DM2, HbA1c 6.6%) addressed with Lantus (32 units bedtime), Admelog (5 units tid before meals), and SSI before meals/bedtime  - Electrolyte supplementation: magnesium sulfate, sodium phosphate; multivitamin ordered    GI:    Last BM 6/15 (x2).   Bowel Regimen? [X] Yes: Miralax, senna, Mag-Ox    Weights:   Daily Weight in k.1 (), Weight in k.3 (), Weight in k.9 (), Weight in k.5 (), Weight in k.7 (06-10)  DOSIN.6 kg   IBW: 75.2 kg    Nutritionally Pertinent MEDICATIONS  (STANDING):  atorvastatin  ertapenem  IVPB  fluconAZOLE   Tablet  insulin glargine Injectable (LANTUS)  insulin lispro (ADMELOG) corrective regimen sliding scale  insulin lispro (ADMELOG) corrective regimen sliding scale  insulin lispro Injectable (ADMELOG)  levothyroxine  magnesium oxide  magnesium sulfate  IVPB  multivitamin  NIFEdipine XL  pantoprazole    Tablet  polyethylene glycol 3350  predniSONE   Tablet  senna  sodium bicarbonate  sodium phosphate IVPB  valGANciclovir    Pertinent Labs:  @ 06:00: Na 136, BUN 50<H>, Cr 1.87<H>, <H>, K+ 4.5, Phos 2.9, Mg 1.8, Alk Phos 77, ALT/SGPT 10, AST/SGOT 18,    @ 22:14: Na 138, BUN 52<H>, Cr 1.82<H>, <H>, K+ 4.8,   @ 11:24: Na 143, BUN 49<H>, Cr 1.70<H>, <H>, K+ 5.0,    A1C with Estimated Average Glucose Result: 6.6 % (22 @ 17:12)  A1C with Estimated Average Glucose Result: 7.3 % (22 @ 13:42)  A1C with Estimated Average Glucose Result: 7.2 % (22 @ 05:48)    Finger Sticks:  POCT Blood Glucose.: 123 mg/dL ( @ 08:15)  POCT Blood Glucose.: 176 mg/dL ( @ 21:12)  POCT Blood Glucose.: 104 mg/dL ( @ 17:09)  POCT Blood Glucose.: 99 mg/dL ( @ 16:11)  POCT Blood Glucose.: 129 mg/dL ( @ 15:08)  POCT Blood Glucose.: 151 mg/dL ( @ 14:17)  POCT Blood Glucose.: 119 mg/dL ( @ 13:05)  POCT Blood Glucose.: 184 mg/dL ( @ 12:04)  POCT Blood Glucose.: 230 mg/dL ( @ 11:02)  POCT Blood Glucose.: 246 mg/dL ( @ 10:06)  POCT Blood Glucose.: 219 mg/dL ( @ 09:08)    Skin per nursing documentation: stage II sacrum (present on admission)  Edema: 2+ generalized, dependent    Estimated Needs: based on dosing wt 61.7kg, with consideration for malnutrition, BMI 19.5 kg/m2  Energy: 0583-3569 calories (30-35 kcal/kg)  Protein: 74-92 grams (1.2-1.5 gm/kg)    Previous Nutrition Diagnosis: Severe Malnutrition  Nutrition Diagnosis is: [X] ongoing; addressed with therapeutic diet and oral supplements    New Nutrition Diagnosis: Increased Nutrient Needs (protein, micronutrients) related to increased physiologic demand of stress factor and wound healing as evidenced by stage II pressure injury present on admission    Nutrition Care Plan:  [X] In Progress  [] Achieved  [] Not applicable    Nutrition Interventions:     Education Provided:       [] Yes:  [X] No:        Recommendations:         [X] Continue current diet order: Consistent Carbohydrate diet with snack; Soft and Bite Sized, with 3 servings Ensure Pudding      [X] Add oral nutrition supplement: Nectar Consistency No Sugar Added Mighty Shake supplement  (200 kcal, 7 g protein) added to meal trays.      [X] Continue current micronutrient supplementation: multivitamin, electrolyte repletion prn    Monitoring and Evaluation:   Continue to monitor nutritional intake, tolerance to diet prescription, weights, labs, skin integrity      RD remains available upon request and will follow up per protocol  Wendie Lazaro, MS RD CDN [facility] (pager [phone])

## 2022-06-17 NOTE — PROGRESS NOTE ADULT - SUBJECTIVE AND OBJECTIVE BOX
Transplant Surgery Progress Note   --------------------------------------------------------------  DCD DDRT     Date: 4/21/22    Present: Patient seen and examined with multidisciplinary team including Transplant Surgeon: Dr. Mann, Dr. David, Nephrologist: Dr. Waqar Lomeli, nephrology fellow Dr. Pantoja, Transplant pharmacist VENICE Lim, WALDO Esparza and unit RN during am rounds.  Disciplines not in attendance will be notified of the plan.     HPI: 66 yr old man with PMH: DM type II, HTN, CAD s/p CABG in 2020, Afib on Eliquis, CVA in 2019 due to Afib, Seizure d/o following CVA last episode was on 4/8/22, h/o GI bleed in 2/2022 EGD with duodenal ulcer.  ESRD due to DM was on HD since 2019, s/p DCD DDRT on 4/21/22.  Donor was 58 , KDPI 82%, DCD, single vessels and ureter, HLA mismatch 1, 2, 2. No DSA, cPRA 0%. 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 Was Readmitted in May for anemia in the setting of large perinephric hematoma s/p evacuation and repair of arterial anastomosis on 5/2/22. Intra operative biopsy showed no rejection, Creatinine ranging ~2mg/dL.  In Rehab: He was recently dx with klebsiella UTI rx with ertapenem - then switched to Levaquin day #6.    He also had parainfluenza pneumonia. Was at rehab progressing well.      Hospital course:  - 6/10: transferred from rehab facility for seizure status epilepticus. Intubated for respiratory protection and transferred to MICU. EEG done no seizure activity recorded.  Neuro following/adjusting meds  - 6/11: Extubated. Found to have pleural effusion on eliquis Underwent Thoracentesis 1.3L. eliquis changed to heparin drip, post procedure drop in H&H. 5u PRBC, 2 u FFP.    - 6/11 evening Transferred to SICU for further care in setting of hypoxia, significant R pleural effusion, hgb 6 and hemodynamic instability  - 6/11 Intubated at 9pm and sedated on Precedex.  CT placed, 600ml blood drained.  1L total overnight, started pressors  - 6/11 Received Protamine for PTT >100 (was on Heparin drip started for AF), 2 u FFP and 5u PRBC total  - 6/13 s/p VATS 2.2L old blood removed; second chest tube placed     Interval Events:  - afebrile, stable VSS  - Off insulin gtt  - BP better controlled, started on Nifedipine   - both Chest tubes to waterseal, c/o tenderness at insertion site     Immunosuppression:   Induction:  Thymoglobulin                                        Maintenance immunosuppression: Tacrolimus HELD, MMF 500mg BID, Pred 5  Ongoing monitoring for signs of rejection.    Potential Discharge date: pending clinical improvement  Education:  Medications  Plan of care:  See Below    MEDICATIONS  (STANDING):  atorvastatin 40 milliGRAM(s) Oral at bedtime  chlorhexidine 2% Cloths 1 Application(s) Topical <User Schedule>  diVALproex DR 1000 milliGRAM(s) Oral two times a day  ertapenem  IVPB 1000 milliGRAM(s) IV Intermittent every 24 hours  fluconAZOLE   Tablet 200 milliGRAM(s) Oral daily  insulin glargine Injectable (LANTUS) 32 Unit(s) SubCutaneous at bedtime  insulin lispro (ADMELOG) corrective regimen sliding scale   SubCutaneous three times a day before meals  insulin lispro (ADMELOG) corrective regimen sliding scale   SubCutaneous at bedtime  insulin lispro Injectable (ADMELOG) 4 Unit(s) SubCutaneous three times a day before meals  levETIRAcetam 250 milliGRAM(s) Oral two times a day  levothyroxine 50 MICROGram(s) Oral daily  magnesium oxide 400 milliGRAM(s) Oral every 8 hours  morphine  - Injectable 1 milliGRAM(s) IV Push once  multivitamin 1 Tablet(s) Oral daily  mycophenolate mofetil 500 milliGRAM(s) Oral <User Schedule>  NIFEdipine XL 30 milliGRAM(s) Oral daily  pantoprazole    Tablet 40 milliGRAM(s) Oral before breakfast  polyethylene glycol 3350 17 Gram(s) Oral daily  predniSONE   Tablet 5 milliGRAM(s) Oral daily  senna 2 Tablet(s) Oral at bedtime  sodium bicarbonate 1300 milliGRAM(s) Oral three times a day  tacrolimus 0.5 milliGRAM(s) Oral <User Schedule>  valGANciclovir 450 milliGRAM(s) Oral <User Schedule>    MEDICATIONS  (PRN):  acetaminophen     Tablet .. 975 milliGRAM(s) Oral every 6 hours PRN Mild Pain (1 - 3)    PAST MEDICAL & SURGICAL HISTORY:  Hypertension  Diabetes  Dyslipidemia  CAD (Coronary Artery Disease) with Stents in 06/2009 and 6/2019, s/p off-pump C3L on 8/13/20  Hypothyroidism CVA (cerebral vascular accident) 12/13/19 with residual bilateral weakness  Anemia  PEG (percutaneous endoscopic gastrostomy) status removed July 2020  Intubation of airway performed without difficulty Dec 2019  CVA c/b status epilepticus requiring intubation and PEG in 12/2019-  ESRD on dialysis M/W/F  Seizures after CVA, last seizure was last week 4/07/22  History of insertion of stent into coronary artery bypass graft 2009 and 2019  S/P CABG x 3 off pump C3L on 8/13/20    Vital Signs Last 24 Hrs  T(C): 36.4 (17 Jun 2022 13:00), Max: 37 (16 Jun 2022 19:00)  T(F): 97.5 (17 Jun 2022 13:00), Max: 98.6 (16 Jun 2022 19:00)  HR: 71 (17 Jun 2022 13:00) (68 - 78)  BP: 142/69 (17 Jun 2022 13:00) (101/59 - 169/79)  BP(mean): 84 (17 Jun 2022 11:00) (74 - 114)  RR: 18 (17 Jun 2022 13:00) (10 - 22)  SpO2: 100% (17 Jun 2022 13:00) (99% - 100%)    I&O's Summary    16 Jun 2022 07:01  -  17 Jun 2022 07:00  --------------------------------------------------------  IN: 769.5 mL / OUT: 1678 mL / NET: -908.5 mL    17 Jun 2022 07:01  -  17 Jun 2022 15:10  --------------------------------------------------------  IN: 1000 mL / OUT: 350 mL / NET: 650 mL                         8.6    6.79  )-----------( 211      ( 17 Jun 2022 06:00 )             26.9     06-17    136  |  105  |  50<H>  ----------------------------<  153<H>  4.5   |  22  |  1.87<H>    Ca    8.6      17 Jun 2022 06:00  Phos  2.9     06-17  Mg     1.8     06-17    TPro  5.4<L>  /  Alb  2.2<L>  /  TBili  0.2  /  DBili  x   /  AST  18  /  ALT  10  /  AlkPhos  77  06-17    Tacrolimus (), Serum: 6.1 ng/mL (06-17 @ 05:58)        Culture - Fungal, Body Fluid (collected 06-11-22 @ 02:42)  Source: .Body Fluid Pleural Fluid  Preliminary Report (06-13-22 @ 10:21):    Testing in progress    Culture - Body Fluid with Gram Stain (collected 06-11-22 @ 02:42)  Source: .Body Fluid Pleural Fluid  Gram Stain (06-11-22 @ 12:29):    Few polymorphonuclear leukocytes seen    No organisms seen  Final Report (06-16-22 @ 12:25):    No growth at 5 days    Culture - Blood (collected 06-10-22 @ 18:15)  Source: .Blood Blood  Final Report (06-16-22 @ 01:00):    No Growth Final    Culture - Blood (collected 06-10-22 @ 18:00)  Source: .Blood Blood  Final Report (06-16-22 @ 01:00):    No Growth Final    REVIEW OF SYSTEMS  --------------------------------------------------------------------------------  Gen: No weight changes, fatigue, fevers/chills, weakness  Skin: No rashes  Head/Eyes/Ears/Mouth: No headache; Normal hearing; Normal vision w/o blurriness; No sinus pain/discomfort, sore throat  Respiratory: No dyspnea, cough, wheezing, hemoptysis. Some pain at CT sites  CV: No chest pain, PND, orthopnea  GI: No abdominal pain, diarrhea, constipation, nausea, vomiting, melena, hematochezia  : No increased frequency, dysuria, hematuria, nocturia  MSK: No joint pain/swelling; no back pain; no edema  Neuro: No dizziness/lightheadedness, weakness, seizures, numbness, tingling  Heme: No easy bruising or bleeding  Endo: No heat/cold intolerance  Psych: No significant nervousness, anxiety, stress, depression  All other systems were reviewed and are negative, except as noted.    PHYSICAL EXAM:  Constitutional: Well developed / well nourished  Eyes: Anicteric, PERRLA  ENMT: nc/at  Neck: supple  Respiratory: CTA anterior. R CT x2 to WS  Cardiovascular: RRR  Gastrointestinal: Soft, non distended, NT, incision healed   Genitourinary: tucker  Extremities: SCD's in place and working bilaterally, no edema  Vascular: Palpable dp pulses bilaterally  Neurological: A&O x3  Skin: no rashes, ulcerations or lesions  Musculoskeletal: Moving all extremities  Psychiatric: Responsive

## 2022-06-17 NOTE — PROGRESS NOTE ADULT - PROBLEM SELECTOR PLAN 1
S/p DDRT on 4/21/22 Cr. stable in 2 range. ANA in the setting of hemodynamic instability 2/2 ATN. Donor was 58 , KDPI 82%, DCD, single vessels and ureter, HLA mismatch 1, 2, 2. No DSA, cPRA 0%. 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 Readmitted in May for anemia in the setting of large perinephric hematoma s/p evacuation and repair of arterial anastomosis on 5/2/22. Intra operative biopsy showed no rejection. As outpatient SCr. in th low 2 range. Admitted with lowest documented Cr. of 1.8 since transplant which has trended to 1.8 today. Switched cefepime to Ertapenem for UTI because it is less likely to induce seizures, follow cultures and sensitivities. Optimize hemodynamics. Adequate UOP. C/w NaHCO3 for acidosis and will help with kaliuresis. C/w Nifedipine 30mg Daily for hypertension.

## 2022-06-17 NOTE — PROGRESS NOTE ADULT - ASSESSMENT
Mr. Castelan is a 66 yr old man with PMHx DM type II, HTN, CAD s/p CABG in 2020, Afib on Eliquis, CVA in 2019 due to Afib, Seizure d/o following CVA last episode was on 4/8/22, h/o GI bleed in 2/2022 EGD with duodenal ulcer,     ESRD s/p DDRT on 4/21/22 (course complicated by DGF, was on HD until 4/29/22, large perinephric hematoma s/p evacuation and repair of arterial anastomosis on 5/2/22) presented from rehab 6/10 with status epilepticus secondary to likely UTI, intubated for airway protection, s/p right thoracentesis complicated by hemothorax.  - 6/11 chest tube placed- 1liter drained   6/12-  intubated sedated CT scan completed  6/13  - Intubated   Chest tube 130/530 for OR with Dr Stover date Wednesday Lizett 15 at 1330  6/14   remains extubated,     vss   spoke to son  received consent for OR   in am  0730  rt chest tube  6/16    remains   extubated,     vss    2 rt chest tube lws  6/17 patient currently stable on room air. R chest tube #1 - WS +A/L, R chest tube #2 - WS no A/L.

## 2022-06-17 NOTE — PROGRESS NOTE ADULT - PROBLEM SELECTOR PLAN 1
sp  rt vats drainage hemothorax  2 R chest tubes in place to WS   chest tube #1 with minimal drainage + A/L   CXR in AM - No obvious ptx   Daily XRay  while tubes in place    Care as per SICU team

## 2022-06-17 NOTE — PROGRESS NOTE ADULT - SUBJECTIVE AND OBJECTIVE BOX
Follow Up:  UTI    Interval History: afebrile. noting back pain today.     REVIEW OF SYSTEMS  [  ] ROS unobtainable because:    [ x ] All other systems negative except as noted below    Constitutional:  [ ] fever [ ] chills  [ ] weight loss  [ ] weakness  Skin:  [ ] rash [ ] phlebitis	  Eyes: [ ] icterus [ ] pain  [ ] discharge	  ENMT: [ ] sore throat  [ ] thrush [ ] ulcers [ ] exudates  Respiratory: [ ] dyspnea [ ] hemoptysis [ ] cough [ ] sputum	  Cardiovascular:  [ ] chest pain [ ] palpitations [ ] edema	  Gastrointestinal:  [ ] nausea [ ] vomiting [ ] diarrhea [ ] constipation [ ] pain	  Genitourinary:  [ ] dysuria [ ] frequency [ ] hematuria [ ] discharge [ ] flank pain  [ ] incontinence  Musculoskeletal:  [ ] myalgias [ ] arthralgias [ ] arthritis  [x ] back pain  Neurological:  [ ] headache [ ] seizures  [ ] confusion/altered mental status    Allergies  No Known Allergies        ANTIMICROBIALS:  ertapenem  IVPB 1000 every 24 hours  fluconAZOLE   Tablet 200 daily  valGANciclovir 450 <User Schedule>      OTHER MEDS:  MEDICATIONS  (STANDING):  acetaminophen     Tablet .. 975 every 6 hours PRN  atorvastatin 40 at bedtime  diVALproex DR 1000 two times a day  insulin glargine Injectable (LANTUS) 32 at bedtime  insulin lispro (ADMELOG) corrective regimen sliding scale  three times a day before meals  insulin lispro (ADMELOG) corrective regimen sliding scale  at bedtime  insulin lispro Injectable (ADMELOG) 4 three times a day before meals  levETIRAcetam 250 two times a day  levothyroxine 50 daily  morphine  - Injectable 1 once  mycophenolate mofetil 500 <User Schedule>  NIFEdipine XL 30 daily  pantoprazole    Tablet 40 before breakfast  polyethylene glycol 3350 17 daily  predniSONE   Tablet 5 daily  senna 2 at bedtime  tacrolimus 0.5 <User Schedule>      Vital Signs Last 24 Hrs  T(C): 36.7 (17 Jun 2022 17:00), Max: 36.7 (17 Jun 2022 17:00)  T(F): 98 (17 Jun 2022 17:00), Max: 98 (17 Jun 2022 17:00)  HR: 76 (17 Jun 2022 17:00) (68 - 78)  BP: 136/67 (17 Jun 2022 17:00) (101/59 - 160/75)  BP(mean): 84 (17 Jun 2022 11:00) (74 - 108)  RR: 18 (17 Jun 2022 17:00) (10 - 22)  SpO2: 99% (17 Jun 2022 17:00) (99% - 100%)    PHYSICAL EXAMINATION:  General: Alert and Awake, NAD  Cardiac: RRR, No M/R/G  Resp: CTAB, No Wh/Rh/Ra  Abdomen: NBS, NT/ND, No HSM, No rigidity or guarding  MSK: No LE edema. No Calf tenderness  Skin: No rashes or lesions. Skin is warm and dry to the touch.   Neuro: Alert and Awake. CN 2-12 Grossly intact. Moves all four extremities spontaneously.  Psych: Calm, Pleasant, Cooperative                          8.6    6.79  )-----------( 211      ( 17 Jun 2022 06:00 )             26.9       06-17    136  |  105  |  50<H>  ----------------------------<  153<H>  4.5   |  22  |  1.87<H>    Ca    8.6      17 Jun 2022 06:00  Phos  2.9     06-17  Mg     1.8     06-17    TPro  5.4<L>  /  Alb  2.2<L>  /  TBili  0.2  /  DBili  x   /  AST  18  /  ALT  10  /  AlkPhos  77  06-17          MICROBIOLOGY:  v  .Body Fluid Pleural Fluid  06-11-22   No growth at 5 days  --    Few polymorphonuclear leukocytes seen  No organisms seen      .Blood Blood  06-10-22   No Growth Final  --  --      .Blood Blood  06-10-22   No Growth Final  --  --    RADIOLOGY:    <The imaging below has been reviewed and visualized by me independently. Findings as detailed in report below>    < from: Xray Chest 1 View- PORTABLE-Routine (Xray Chest 1 View- PORTABLE-Routine in AM.) (06.17.22 @ 07:04) >  IMPRESSION:  Small right pleural effusion or thickening.  Support devices as above.    < end of copied text >

## 2022-06-17 NOTE — PROGRESS NOTE ADULT - ASSESSMENT
66 yr old man with h/o DM type II, HTN, CAD s/p CABG in 2020, Afib on Eliquis, CVA in 2019 due to Afib, Seizure d/o (last episode was on 4/8/22), h/o GI bleed in 2/2022 EGD with duodenal ulcer and ESRD on HD s/p DDRT from DCD donor on 4/21/22 complicated by DGF requiring HD until 4/29/22 now with downtrending creatinine who presented with status epilepticus in setting of UTI/antibiotics and sub-therapeutic valproic acid level     Seizure  - status epilepticus in setting of UTI/antibiotics and sub-therapeutic valproic acid level   - No seizure activity recorded on EEG.  EEG disconnected  - On Keppra and Valproic acid, adjusted by Neuro  - Appreciate Neurology recommendations  - Keep Mag > 2      DCD DDRT 4/21/22  - stable renal function   - Strict I/Os  - Regular diet  - Immunosuppression: start Prograf 0.5mg BID, MMF 500mg bid, prednisone 5mg daily  - PPX: Valcyte (MWF), Fluconazole 200mg daily (on fluconazole after repair of arterial anastomosis).  Hold Bactrim for hyperkalemia.  - Daily labs CBC, CMP, Mag, Phos, Tacrolimus level daily in AM prior to dose    Klebsiella UTI  - ID following   - Continue Ertapenam (previous ESBL Kleb UTI), last dose Saturday  - Blood Cx  6/10 ngtd    DM  - off insulin gtt  - cont lantus/lispro     HTN:  - cont Nifedipine 30mg po qd

## 2022-06-17 NOTE — PROGRESS NOTE ADULT - SUBJECTIVE AND OBJECTIVE BOX
Patient is a 66y old  Male who presents with a chief complaint of Status Epilepticus (2022 01:44)      SUBJECTIVE: Patient is feels lethargic this am     Vital Signs Last 24 Hrs  T(C): 36.4 (22 @ 07:00), Max: 37 (22 @ 19:00)  T(F): 97.5 (22 @ 07:00), Max: 98.6 (22 @ 19:00)  HR: 69 (22 @ 08:00) (68 - 79)  BP: 101/59 (22 @ 08:00) (101/59 - 169/79)  RR: 22 (22 @ 08:00) (9 - 31)  SpO2: 100% (22 @ 08:00) (99% - 100%)                22 @ 07:01  -  22 @ 07:00  --------------------------------------------------------  IN: 769.5 mL / OUT: 1678 mL / NET: -908.5 mL    22 @ 07:01  -  22 @ 10:59  --------------------------------------------------------  IN: 50 mL / OUT: 0 mL / NET: 50 mL        Daily     Daily Weight in k.1 (2022 00:56)                          8.6    6.79  )-----------( 211      ( 2022 06:00 )             26.9     17    136  |  105  |  50<H>  ----------------------------<  153<H>  4.5   |  22  |  1.87<H>    Ca    8.6      2022 06:00  Phos  2.9       Mg     1.8     -    TPro  5.4<L>  /  Alb  2.2<L>  /  TBili  0.2  /  DBili  x   /  AST  18  /  ALT  10  /  AlkPhos  77            PHYSICAL EXAM  Neurology: A&Ox3, NAD, no gross deficits  CV : RRR+S1S2  Lungs: Respirations non-labored, B/L BS  Abdomen: Soft, NT/ND, +BSx4Q  Extremities: B/L LE edema, negative calf tenderness, +PP           CHEST TUBES:  Left CT     [  ]LWS  [  ]H2O seal  Right CT   [  ]LWS  [ XX ]H2O seal            MEDICATIONS  acetaminophen     Tablet .. 975 milliGRAM(s) Oral every 6 hours PRN  atorvastatin 40 milliGRAM(s) Oral at bedtime  chlorhexidine 2% Cloths 1 Application(s) Topical <User Schedule>  ertapenem  IVPB 1000 milliGRAM(s) IV Intermittent every 24 hours  fluconAZOLE   Tablet 200 milliGRAM(s) Oral daily  insulin glargine Injectable (LANTUS) 32 Unit(s) SubCutaneous at bedtime  insulin lispro (ADMELOG) corrective regimen sliding scale   SubCutaneous three times a day before meals  insulin lispro (ADMELOG) corrective regimen sliding scale   SubCutaneous at bedtime  insulin lispro Injectable (ADMELOG) 4 Unit(s) SubCutaneous three times a day before meals  levETIRAcetam  IVPB 250 milliGRAM(s) IV Intermittent every 12 hours  levothyroxine 50 MICROGram(s) Oral daily  magnesium oxide 400 milliGRAM(s) Oral every 8 hours  multivitamin 1 Tablet(s) Oral daily  mycophenolate mofetil 500 milliGRAM(s) Oral <User Schedule>  NIFEdipine XL 30 milliGRAM(s) Oral daily  pantoprazole    Tablet 40 milliGRAM(s) Oral before breakfast  polyethylene glycol 3350 17 Gram(s) Oral daily  predniSONE   Tablet 5 milliGRAM(s) Oral daily  senna 2 Tablet(s) Oral at bedtime  sodium bicarbonate 1300 milliGRAM(s) Oral three times a day  valGANciclovir 450 milliGRAM(s) Oral <User Schedule>  valproate sodium  IVPB 750 milliGRAM(s) IV Intermittent every 8 hours

## 2022-06-17 NOTE — PROGRESS NOTE ADULT - SUBJECTIVE AND OBJECTIVE BOX
[facility] DIVISION OF KIDNEY DISEASES AND HYPERTENSION -- FOLLOW UP NOTE  --------------------------------------------------------------------------------  HPI:  66 yr old man with ESRD due to DM was on HD since 2019, s/p DCD DDRT on 4/21/22. Donor was 58 , KDPI 82%, DCD, single vessels and ureter, HLA mismatch 1, 2, 2. No DSA, cPRA 0%. 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 Readmitted in May for anemia in the setting of large perinephric hematoma s/p evacuation and repair of arterial anastomosis on 5/2/22. Intra operative biopsy showed no rejection, Creatinine ranging 2mg/dL. He was recently dx with klebsiella UTI rx with ertapenem - then switched to Levaquin day #6. He also had parainfluenza pneumonia. Was at rehab progressing well. Presented with status epilepticus. Intubated for respiratory protection. Right pleural effusion drained 1300ml. Transfused 1 unit PRBC. Pt. transferred to SICU and was transfused 4 additional units for a total of 6 units.     Pt. seen this AM. Mentation improved able to communicate better today. Complaining of pain at chest tube site. BP better. Good UOP. Now being monitored off antibiotics.       PAST HISTORY  --------------------------------------------------------------------------------  No significant changes to PMH, PSH, FHx, SHx, unless otherwise noted    ALLERGIES & MEDICATIONS  --------------------------------------------------------------------------------  Allergies    No Known Allergies    Intolerances      Standing Inpatient Medications  atorvastatin 40 milliGRAM(s) Oral at bedtime  chlorhexidine 2% Cloths 1 Application(s) Topical <User Schedule>  ertapenem  IVPB 1000 milliGRAM(s) IV Intermittent every 24 hours  fluconAZOLE   Tablet 200 milliGRAM(s) Oral daily  insulin glargine Injectable (LANTUS) 32 Unit(s) SubCutaneous at bedtime  insulin lispro (ADMELOG) corrective regimen sliding scale   SubCutaneous three times a day before meals  insulin lispro (ADMELOG) corrective regimen sliding scale   SubCutaneous at bedtime  insulin lispro Injectable (ADMELOG) 4 Unit(s) SubCutaneous three times a day before meals  levETIRAcetam  IVPB 250 milliGRAM(s) IV Intermittent every 12 hours  levothyroxine 50 MICROGram(s) Oral daily  magnesium oxide 400 milliGRAM(s) Oral every 8 hours  multivitamin 1 Tablet(s) Oral daily  mycophenolate mofetil 500 milliGRAM(s) Oral <User Schedule>  NIFEdipine XL 30 milliGRAM(s) Oral daily  pantoprazole    Tablet 40 milliGRAM(s) Oral before breakfast  polyethylene glycol 3350 17 Gram(s) Oral daily  predniSONE   Tablet 5 milliGRAM(s) Oral daily  senna 2 Tablet(s) Oral at bedtime  sodium bicarbonate 1300 milliGRAM(s) Oral three times a day  valGANciclovir 450 milliGRAM(s) Oral <User Schedule>  valproate sodium  IVPB 750 milliGRAM(s) IV Intermittent every 8 hours    PRN Inpatient Medications  acetaminophen     Tablet .. 975 milliGRAM(s) Oral every 6 hours PRN      REVIEW OF SYSTEMS  --------------------------------------------------------------------------------  Gen: No fevers/chills  Respiratory: No dyspnea, cough,   CV: No chest pain, PND, orthopnea  GI: No abdominal pain, diarrhea, constipation, nausea, vomiting  Transplant: No pain  : No increased frequency, dysuria, hematuria   MSK: No edema  Neuro: No dizziness/lightheadedness    All other systems were reviewed and are negative, except as noted.    VITALS/PHYSICAL EXAM  --------------------------------------------------------------------------------  T(C): 36.4 (06-17-22 @ 07:00), Max: 37 (06-16-22 @ 19:00)  HR: 69 (06-17-22 @ 08:00) (68 - 79)  BP: 101/59 (06-17-22 @ 08:00) (101/59 - 169/79)  RR: 22 (06-17-22 @ 08:00) (9 - 22)  SpO2: 100% (06-17-22 @ 08:00) (99% - 100%)  Wt(kg): --        06-16-22 @ 07:01  -  06-17-22 @ 07:00  --------------------------------------------------------  IN: 769.5 mL / OUT: 1678 mL / NET: -908.5 mL    06-17-22 @ 07:01  -  06-17-22 @ 11:29  --------------------------------------------------------  IN: 50 mL / OUT: 0 mL / NET: 50 mL      Physical Exam:  	Gen: Awake, sitting up on RA  	HEENT: PERRL, MMM   	Pulm: CTAB anteriorly  	CV: S1S2+  	Abd: +BS, soft, non-tender          Transplant: No tenderness, swelling  	: No suprapubic tenderness  	MSK: no LE edema, some bilateral UE edema    	Psych: normal affect   	Skin: Warm          Access: CVC catheter    LABS/STUDIES  --------------------------------------------------------------------------------              8.6    6.79  >-----------<  211      [06-17-22 @ 06:00]              26.9     136  |  105  |  50  ----------------------------<  153      [06-17-22 @ 06:00]  4.5   |  22  |  1.87        Ca     8.6     [06-17-22 @ 06:00]      Mg     1.8     [06-17-22 @ 06:00]      Phos  2.9     [06-17-22 @ 06:00]    TPro  5.4  /  Alb  2.2  /  TBili  0.2  /  DBili  x   /  AST  18  /  ALT  10  /  AlkPhos  77  [06-17-22 @ 06:00]    PT/INR: PT 14.2 , INR 1.23       [06-17-22 @ 06:00]  PTT: 31.3       [06-17-22 @ 06:00]      Creatinine Trend:  SCr 1.87 [06-17 @ 06:00]  SCr 1.82 [06-16 @ 22:14]  SCr 1.70 [06-16 @ 11:24]  SCr 1.70 [06-16 @ 05:56]  SCr 1.75 [06-16 @ 00:44]    Tacrolimus (), Serum: 11.1 ng/mL (06-16 @ 05:56)  Tacrolimus (), Serum: 7.5 ng/mL (06-15 @ 05:39)  Tacrolimus (), Serum: 8.7 ng/mL (06-14 @ 06:03)  Tacrolimus (), Serum: 9.8 ng/mL (06-13 @ 05:59)      Urinalysis - [06-10-22 @ 22:28]      Color Light Yellow / Appearance Clear / SG 1.016 / pH 6.0      Gluc 100 mg/dL / Ketone Negative  / Bili Negative / Urobili Negative       Blood Small / Protein Trace / Leuk Est Negative / Nitrite Negative      RBC 25 / WBC 5 / Hyaline  / Gran  / Sq Epi  / Non Sq Epi 1 / Bacteria Negative    Urine Creatinine 38      [06-11-22 @ 09:32]  Urine Protein 18      [06-11-22 @ 09:32]  Urine Sodium 50      [06-11-22 @ 09:32]  Urine Potassium 10      [06-11-22 @ 09:32]  Urine Chloride <20      [06-11-22 @ 09:32]    Iron 17, TIBC 171, %sat 10      [05-02-22 @ 13:03]  Ferritin 1505      [05-02-22 @ 13:05]  PTH -- (Ca 9.2)      [02-05-22 @ 10:13]   294  HbA1c 6.0      [02-21-20 @ 08:53]  Lipid: chol 118, TG 54, HDL 59, LDL --      [06-27-21 @ 09:44]

## 2022-06-17 NOTE — PROGRESS NOTE ADULT - PROBLEM SELECTOR PLAN 2
C/w prograf 0.5mg bid. Obtain tacrolimus level 30min prior to AM dose. C/w prednisone 5mg daily. C/w half-dose cellcept. C/w Valcyte, and fluconazole 200mg daily (on fluconazole after repair of arterial anastomosis) for prophylaxis. D/c bactrim for hyperkalemia, after glucose control will resume.      If you have any questions, please feel free to contact me  Ant Pantoja  Nephrology Fellow  [phone]; Prefer [employer] TEAMS  (After 5pm or on weekends please page the on-call fellow)

## 2022-06-17 NOTE — PROGRESS NOTE ADULT - ATTENDING COMMENTS
65 y/o male w/ a PMHx of CAD s/p CABG, AF on Eliquis c/b CVA c/b seizures, HTN, HLD, DM type II, hypothyroidism, GI bleed due to a duodenal ulcer, and ESRD s/p DDRT c/b DGF requiring HD & large perinephric hematoma s/p evacuation w/ revision of arterial anastomosis who presented in status epilepticus requiring intubation with a hospital course c/b large right pleural effusion s/p thoracentesis c/b hemothorax while being anticoagulated s/p chest tube placement w/ CT demonstrating retained hemothorax    Awake and alert, comfortable  On Keppra and valproic acid for seizure, increase VA dose to 1 gm bid sec to low level  Hemodynamically stable  PO  CT to waterseal, no air leak. CXR no ptx  Off insulin drip to ISS/premeal/Lantus  Abx Ertapenem and Diflucan for UTI  Transplant/antirejection/ ppx abx. Hyperkalemia resolved. Off  Bactrim   Mechanical DVT ppx, no contraindication to pharm DVT ppx

## 2022-06-18 NOTE — PROGRESS NOTE ADULT - PROBLEM SELECTOR PLAN 1
sp  rt vats drainage hemothorax  2 R chest tubes in place to WS   chest tube #1 with minimal drainage + A/L   CXR in AM - No obvious ptx   Daily XRay  while tubes in place    Care as per SICU team sp  rt vats drainage hemothorax  2 R chest tubes in place to WS,    will dc   tube    #2   today    CXR in AM   Daily XRay  while tubes in place    Care as per SICU team

## 2022-06-18 NOTE — PROGRESS NOTE ADULT - ASSESSMENT
66 yr old man with h/o DM type II, HTN, CAD s/p CABG in 2020, Afib on Eliquis, CVA in 2019 due to Afib, Seizure d/o (last episode was on 4/8/22), h/o GI bleed in 2/2022 EGD with duodenal ulcer and ESRD on HD s/p DDRT from DCD donor on 4/21/22 complicated by DGF requiring HD until 4/29/22 now with downtrending creatinine who presented with status epilepticus in setting of UTI/antibiotics and sub-therapeutic valproic acid level     Seizure  - On Keppra and Valproic acid, adjusted by Neuro  - Keep Mag > 2    DCD DDRT (ureter stented) 4/21/22  - stable renal function   - Strict I/Os  - Regular diet  - Immunosuppression: Prograf 0.5mg BID, MMF 500mg bid, prednisone 5mg daily  - PPX: Valcyte (MWF), Fluconazole 200mg daily (on fluconazole after repair of arterial anastomosis).  Hold Bactrim for hyperkalemia.  - discuss ureteral stent removal this admission     Klebsiella UTI  - ID following   - Continue Ertapenam (previous ESBL Kleb UTI), last dose today   - Blood Cx  6/10 ngtd    DM  - hypoglycemic  - Endocrine consulted  - started D5W for FS 38    HTN:  - cont Nifedipine 30mg po qd    Dispo  PT c/s

## 2022-06-18 NOTE — PROVIDER CONTACT NOTE (HYPOGLYCEMIA EVENT) - NS PROVIDER CONTACT BACKGROUND-HYPO
Age: 66y    Gender: Male    POCT Blood Glucose:  142 mg/dL (06-18-22 @ 09:22)  38 mg/dL (06-18-22 @ 09:04)  165 mg/dL (06-18-22 @ 02:07)  111 mg/dL (06-17-22 @ 23:23)  94 mg/dL (06-17-22 @ 22:52)  71 mg/dL (06-17-22 @ 22:13)  62 mg/dL (06-17-22 @ 21:39)  79 mg/dL (06-17-22 @ 17:24)      eMAR:  atorvastatin   40 milliGRAM(s) Oral (06-17-22 @ 22:05)    insulin glargine Injectable (LANTUS)   16 Unit(s) SubCutaneous (06-17-22 @ 23:25)    insulin lispro Injectable (ADMELOG)   4 Unit(s) SubCutaneous (06-17-22 @ 12:18)    levothyroxine   50 MICROGram(s) Oral (06-18-22 @ 05:34)    predniSONE   Tablet   5 milliGRAM(s) Oral (06-18-22 @ 05:34)

## 2022-06-18 NOTE — PROGRESS NOTE ADULT - SUBJECTIVE AND OBJECTIVE BOX
[facility] DIVISION OF KIDNEY DISEASES AND HYPERTENSION -- FOLLOW UP NOTE  --------------------------------------------------------------------------------  Chief Complaint:  seizure    24 hour events/subjective:  PT tired but otherwise ok. Minimal pain by chest tubes.  Hypoglycemic today      PAST HISTORY  --------------------------------------------------------------------------------  No significant changes to PMH, PSH, FHx, SHx, unless otherwise noted    ALLERGIES & MEDICATIONS  --------------------------------------------------------------------------------  Allergies    No Known Allergies    Intolerances      Standing Inpatient Medications  atorvastatin 40 milliGRAM(s) Oral at bedtime  chlorhexidine 2% Cloths 1 Application(s) Topical <User Schedule>  dextrose 5%. 1000 milliLiter(s) IV Continuous <Continuous>  dextrose 5%. 1000 milliLiter(s) IV Continuous <Continuous>  dextrose 50% Injectable 25 Gram(s) IV Push once  dextrose 50% Injectable 12.5 Gram(s) IV Push once  dextrose 50% Injectable 25 Gram(s) IV Push once  diVALproex DR 1000 milliGRAM(s) Oral two times a day  ertapenem  IVPB 1000 milliGRAM(s) IV Intermittent every 24 hours  fluconAZOLE   Tablet 200 milliGRAM(s) Oral daily  insulin glargine Injectable (LANTUS) 16 Unit(s) SubCutaneous at bedtime  insulin lispro (ADMELOG) corrective regimen sliding scale   SubCutaneous three times a day before meals  insulin lispro (ADMELOG) corrective regimen sliding scale   SubCutaneous at bedtime  insulin lispro Injectable (ADMELOG) 4 Unit(s) SubCutaneous three times a day before meals  levETIRAcetam 250 milliGRAM(s) Oral two times a day  levothyroxine 50 MICROGram(s) Oral daily  magnesium oxide 400 milliGRAM(s) Oral every 8 hours  multivitamin 1 Tablet(s) Oral daily  mycophenolate mofetil 500 milliGRAM(s) Oral <User Schedule>  NIFEdipine XL 30 milliGRAM(s) Oral daily  pantoprazole    Tablet 40 milliGRAM(s) Oral before breakfast  polyethylene glycol 3350 17 Gram(s) Oral daily  predniSONE   Tablet 5 milliGRAM(s) Oral daily  senna 2 Tablet(s) Oral at bedtime  sodium bicarbonate 1300 milliGRAM(s) Oral three times a day  tacrolimus 0.5 milliGRAM(s) Oral <User Schedule>  valGANciclovir 450 milliGRAM(s) Oral <User Schedule>    PRN Inpatient Medications  acetaminophen     Tablet .. 975 milliGRAM(s) Oral every 6 hours PRN  dextrose Oral Gel 15 Gram(s) Oral once PRN      REVIEW OF SYSTEMS  --------------------------------------------------------------------------------  Gen: + weakness  Skin: No rashes  Head/Eyes/Ears/Mouth: No headache;No sore throat  Respiratory: No dyspnea, cough,   CV: pain by chest tube site  GI: No abdominal pain, diarrhea, constipation, nausea, vomiting  Transplant: No pain  : No increased frequency, dysuria, hematuria, nocturia  MSK: No joint pain/swelling; no back pain; no edema  Neuro: No dizziness/lightheadedness, weakness, seizures, numbness, tingling  Psych: No significant nervousness, anxiety, stress, depression    All other systems were reviewed and are negative, except as noted.    VITALS/PHYSICAL EXAM  --------------------------------------------------------------------------------  T(C): 36.4 (06-18-22 @ 09:00), Max: 36.8 (06-18-22 @ 01:00)  HR: 82 (06-18-22 @ 09:00) (68 - 95)  BP: 154/66 (06-18-22 @ 09:00) (126/58 - 162/77)  RR: 18 (06-18-22 @ 09:00) (18 - 20)  SpO2: 95% (06-18-22 @ 09:00) (95% - 100%)  Wt(kg): --        06-17-22 @ 07:01  -  06-18-22 @ 07:00  --------------------------------------------------------  IN: 1750 mL / OUT: 1382 mL / NET: 368 mL      Physical Exam:  	Gen: appears ill   	HEENT: PERRL,   	Pulm: CTA B/L, chest tubes in place  	CV: RRR, S1S2; no rub  	Back: No spinal or CVA tenderness; no sacral edema  	Abd: +BS, soft, nontender/nondistended                      Transplant: No tenderness, swelling  	: No suprapubic tenderness  	LE: no edema  	Neuro: No focal deficits  	    LABS/STUDIES  --------------------------------------------------------------------------------              10.1   6.25  >-----------<  222      [06-18-22 @ 06:49]              33.0     137  |  106  |  42  ----------------------------<  46      [06-18-22 @ 06:47]  4.8   |  18  |  1.76        Ca     8.5     [06-18-22 @ 06:47]      Mg     2.0     [06-18-22 @ 06:47]      Phos  2.8     [06-18-22 @ 06:47]    TPro  6.1  /  Alb  2.3  /  TBili  0.3  /  DBili  x   /  AST  25  /  ALT  12  /  AlkPhos  88  [06-18-22 @ 06:47]    PT/INR: PT 13.6 , INR 1.17       [06-18-22 @ 06:51]  PTT: 26.8       [06-18-22 @ 06:51]      Creatinine Trend:  SCr 1.76 [06-18 @ 06:47]  SCr 1.87 [06-17 @ 06:00]  SCr 1.82 [06-16 @ 22:14]  SCr 1.70 [06-16 @ 11:24]  SCr 1.70 [06-16 @ 05:56]    Tacrolimus (), Serum: 6.1 ng/mL (06-17 @ 05:58)  Tacrolimus (), Serum: 11.1 ng/mL (06-16 @ 05:56)  Tacrolimus (), Serum: 7.5 ng/mL (06-15 @ 05:39)  Tacrolimus (), Serum: 8.7 ng/mL (06-14 @ 06:03)            Urinalysis - [06-10-22 @ 22:28]      Color Light Yellow / Appearance Clear / SG 1.016 / pH 6.0      Gluc 100 mg/dL / Ketone Negative  / Bili Negative / Urobili Negative       Blood Small / Protein Trace / Leuk Est Negative / Nitrite Negative      RBC 25 / WBC 5 / Hyaline  / Gran  / Sq Epi  / Non Sq Epi 1 / Bacteria Negative      Iron 17, TIBC 171, %sat 10      [05-02-22 @ 13:03]  Ferritin 1505      [05-02-22 @ 13:05]  PTH -- (Ca 9.2)      [02-05-22 @ 10:13]   294  HbA1c 6.0      [02-21-20 @ 08:53]  Lipid: chol 118, TG 54, HDL 59, LDL --      [06-27-21 @ 09:44]

## 2022-06-18 NOTE — PROVIDER CONTACT NOTE (HYPOGLYCEMIA EVENT) - NS PROVIDER CONTACT RECOMMEND-HYPO
NP Yina Hyman made aware. As per order, will administer 16U of lantus and reassess FS. Updated care plan.

## 2022-06-18 NOTE — PROGRESS NOTE ADULT - ASSESSMENT
Mr. Castelan is a 66 yr old man with PMHx DM type II, HTN, CAD s/p CABG in 2020, Afib on Eliquis, CVA in 2019 due to Afib, Seizure d/o following CVA last episode was on 4/8/22, h/o GI bleed in 2/2022 EGD with duodenal ulcer,     ESRD s/p DDRT on 4/21/22 (course complicated by DGF, was on HD until 4/29/22, large perinephric hematoma s/p evacuation and repair of arterial anastomosis on 5/2/22) presented from rehab 6/10 with status epilepticus secondary to likely UTI, intubated for airway protection, s/p right thoracentesis complicated by hemothorax.  - 6/11 chest tube placed- 1liter drained   6/12-  intubated sedated CT scan completed  6/13  - Intubated   Chest tube 130/530 for OR with Dr Stover date Wednesday Lizett 15 at 1330  6/14   remains extubated,     vss   spoke to son  received consent for OR   in am  0730  rt chest tube  6/16    remains   extubated,     vss    2 rt chest tube lws  6/17 patient currently stable on room air. R chest tube #1 - WS +A/L, R chest tube #2 - WS no A/L.   6/18 Mr. Castelan is a 66 yr old man with PMHx DM type II, HTN, CAD s/p CABG in 2020, Afib on Eliquis, CVA in 2019 due to Afib, Seizure d/o following CVA last episode was on 4/8/22, h/o GI bleed in 2/2022 EGD with duodenal ulcer,     ESRD s/p DDRT on 4/21/22 (course complicated by DGF, was on HD until 4/29/22, large perinephric hematoma s/p evacuation and repair of arterial anastomosis on 5/2/22) presented from rehab 6/10 with status epilepticus secondary to likely UTI, intubated for airway protection, s/p right thoracentesis complicated by hemothorax.  - 6/11 chest tube placed- 1liter drained   6/12-  intubated sedated CT scan completed  6/13  - Intubated   Chest tube 130/530 for OR with Dr Stover date Wednesday Lizett 15 at 1330  6/14   remains extubated,     vss   spoke to son  received consent for OR   in am  0730  rt chest tube  6/16    remains   extubated,     vss    2 rt chest tube lws  6/17 patient currently stable on room air. R chest tube #1 - WS +A/L, R chest tube #2 - WS no A/L.   6/18  pleurvac  #2  min drainage ,   vss    no a/l

## 2022-06-18 NOTE — PROGRESS NOTE ADULT - ASSESSMENT
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 Creatinine trending down to 2-2.2mg/dL. H/o seizure d/o with last seizure episode on 4/8/22. Admitted for status epilepticus in the setting of sub therapeutic valproic acid levels. Had right thoracentesis done on 6/10 for effusion complicated by large hemothorax, hemorrhagic shock requiring PRBC x 5 units and vasopressors, chest tube placed. BP now stable on low dose phenylephrine. Now extubated.     1.  REnal transplant - S/p DDRT on 4/21/22 Cr. stable in 2 range. ANA in the setting of hemodynamic instability 2/2 ATN. Donor was 58 , KDPI 82%, DCD, single vessels and ureter, HLA mismatch 1, 2, 2. No DSA, cPRA 0%. 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 Readmitted in May for anemia in the setting of large perinephric hematoma s/p evacuation and repair of arterial anastomosis on 5/2/22. Intra operative biopsy showed no rejection. As outpatient SCr. in th low 2 range now stable at 1.7.    2.  Immunosuppression - C/w prograf 0.5mg bid. Obtain tacrolimus level 30min prior to AM dose. C/w prednisone 5mg daily. C/w half-dose cellcept. C/w Valcyte, and fluconazole 200mg daily (on fluconazole after repair of arterial anastomosis) for prophylaxis. D/c bactrim for hyperkalemia, after glucose control will resume.      3.  DM2 - now hypoglycemic, not eating.  Start D5 drip and monitor sugars closely.  4.  Seizures - on antiepileptics, stable.   5.  Hemothorax - s/p VATS.  Await thoracic input for chest tubes.

## 2022-06-18 NOTE — PROVIDER CONTACT NOTE (HYPOGLYCEMIA EVENT) - NS PROVIDER CONTACT BACKGROUND-HYPO
Age: 66y    Gender: Male    POCT Blood Glucose:  165 mg/dL (06-18-22 @ 02:07)  111 mg/dL (06-17-22 @ 23:23)  94 mg/dL (06-17-22 @ 22:52)  71 mg/dL (06-17-22 @ 22:13)  62 mg/dL (06-17-22 @ 21:39)  79 mg/dL (06-17-22 @ 17:24)  111 mg/dL (06-17-22 @ 12:14)  123 mg/dL (06-17-22 @ 08:15)      eMAR:  atorvastatin   40 milliGRAM(s) Oral (06-17-22 @ 22:05)    insulin glargine Injectable (LANTUS)   16 Unit(s) SubCutaneous (06-17-22 @ 23:25)    insulin lispro Injectable (ADMELOG)   5 Unit(s) SubCutaneous (06-17-22 @ 08:25)    insulin lispro Injectable (ADMELOG)   4 Unit(s) SubCutaneous (06-17-22 @ 12:18)    levothyroxine   50 MICROGram(s) Oral (06-17-22 @ 05:09)    predniSONE   Tablet   5 milliGRAM(s) Oral (06-17-22 @ 05:09)

## 2022-06-18 NOTE — PROGRESS NOTE ADULT - SUBJECTIVE AND OBJECTIVE BOX
VITAL SIGNS    Telemetry:      Vital Signs Last 24 Hrs  T(C): 36.4 (22 @ 09:00), Max: 36.8 (22 @ 01:00)  T(F): 97.5 (22 @ 09:00), Max: 98.2 (22 @ 01:00)  HR: 82 (22 @ 09:00) (68 - 95)  BP: 154/66 (22 @ 09:00) (126/58 - 162/77)  RR: 18 (22 @ 09:00) (18 - 20)  SpO2: 95% (22 @ 09:00) (95% - 100%)                   Daily     Daily Weight in k.1 (2022 05:00)      Bilirubin Total, Serum: 0.3 mg/dL ( @ 06:47)    CAPILLARY BLOOD GLUCOSE      POCT Blood Glucose.: 142 mg/dL (2022 09:22)  POCT Blood Glucose.: 38 mg/dL (2022 09:04)  POCT Blood Glucose.: 165 mg/dL (2022 02:07)  POCT Blood Glucose.: 111 mg/dL (2022 23:23)  POCT Blood Glucose.: 94 mg/dL (2022 22:52)  POCT Blood Glucose.: 71 mg/dL (2022 22:13)  POCT Blood Glucose.: 62 mg/dL (2022 21:39)  POCT Blood Glucose.: 79 mg/dL (2022 17:24)  POCT Blood Glucose.: 111 mg/dL (2022 12:14)          Drains:                          PHYSICAL EXAM    Neurology: alert and oriented x 3, moves all extremities with no defecits  CV :  RRR  Lungs:   CTA B/L  Abdomen: soft, nontender, nondistended, positive bowel sounds, last bowel movement   Extremities:                                            VITAL SIGNS      Vital Signs Last 24 Hrs  T(C): 36.4 (22 @ 09:00), Max: 36.8 (22 @ 01:00)  T(F): 97.5 (22 @ 09:00), Max: 98.2 (22 @ 01:00)  HR: 82 (22 @ 09:00) (68 - 95)  BP: 154/66 (22 @ 09:00) (126/58 - 162/77)  RR: 18 (22 @ 09:00) (18 - 20)  SpO2: 95% (22 @ 09:00) (95% - 100%)                   Daily     Daily Weight in k.1 (2022 05:00)      Bilirubin Total, Serum: 0.3 mg/dL ( @ 06:47)    CAPILLARY BLOOD GLUCOSE      POCT Blood Glucose.: 142 mg/dL (2022 09:22)  POCT Blood Glucose.: 38 mg/dL (2022 09:04)  POCT Blood Glucose.: 165 mg/dL (2022 02:07)  POCT Blood Glucose.: 111 mg/dL (2022 23:23)  POCT Blood Glucose.: 94 mg/dL (2022 22:52)  POCT Blood Glucose.: 71 mg/dL (2022 22:13)  POCT Blood Glucose.: 62 mg/dL (2022 21:39)  POCT Blood Glucose.: 79 mg/dL (2022 17:24)  POCT Blood Glucose.: 111 mg/dL (2022 12:14)          Drains:                          PHYSICAL EXAM    Neurology: alert and oriented x 3, moves all extremities with no defecits  CV :  RRR  Lungs:   CTA B/L  Abdomen: soft, nontender, nondistended, positive bowel sounds,   Extremities:       no edema

## 2022-06-18 NOTE — PROVIDER CONTACT NOTE (OTHER) - ASSESSMENT
Pt. A&Ox1, VS as charted, not able to answer , name, current date questions. Pt. not able to follow commands of "squeeze hand".

## 2022-06-18 NOTE — PROVIDER CONTACT NOTE (HYPOGLYCEMIA EVENT) - NS PROVIDER CONTACT NOTE-TREATMENT-HYPO
21:45 1cup of cranberry juice w/ 3 sugar packets  22:15 2cups of cranberry juice w/ 3 sugar packets  22:55 1cup of chocolate pudding/4 oz Fruit Juice (Specify quantity, date/time)/Other (Specify)

## 2022-06-18 NOTE — PROGRESS NOTE ADULT - NS ATTEND AMEND GEN_ALL_CORE FT
s/p DDRT with good function, complicated post-op course as above  Immunosuppression - Cont prograf 0.5mg bid; prednisone 5mg daily, cellcept 500mg bid  seizures- keppra and depakote as per neurology  hemothorax, acute blood loss anemia stable without active bleeding, cont chest tubes to water seal, removal as per thoracic surgery  hypoglycemia - lantus decreased, encourage PO intake, monitor levels

## 2022-06-18 NOTE — CONSULT NOTE ADULT - ASSESSMENT
66 yr old M with Type 2 DM A1C 6.6 c/b ESRD s/p DDRT on 3/21, CVA, CAD s/p CABG, Afib on apixaban, seizure activity, HTN, HLD, h/o GI bleed in 2/2022 EGD with duodenal ulcer, discharged to [facility] rehab center after prior hospitalization, now re-admitted from [facility] for seizures c/f status epilepticus in setting of UTI, requiring intubation for hypoxic respiratory failure and admission to MICU, ccb pleural effusion c/b bleeding s/p VATS. Now extubated and transferred to medicine floor.   Endocrine consulted for T2DM mgmt with hyper and hypo glycemia    #T2DM with complications with hyper and hypoglycemia   - goal glucose 100-180  - decrease Lantus to 7 units qhs  - Admelog 3 units TID pre-meal  - low pre-meal correction scale and low HS correction scale  - check FS AC/HS  - carb consistent diet  Discharge  - Plan TBD    #HTN  goal BP<130/80  - manage per primary team    #HLD  - continue statin     #Hypothyroidism  - continue LT4 50 mcg daily  - monitor TFTs outpatient      Alisha Chambers DO, Endocrine Fellow   Pager [phone] from 9-5PM. After hours and on weekends please call [phone].   66 yr old M with Type 2 DM A1C 6.6 c/b ESRD s/p DDRT on 3/21, CVA, CAD s/p CABG, Afib on apixaban, seizure activity, HTN, HLD, h/o GI bleed in 2/2022 EGD with duodenal ulcer, discharged to [facility] rehab center after prior hospitalization, now re-admitted from [facility] for seizures c/f status epilepticus in setting of UTI, requiring intubation for hypoxic respiratory failure and admission to MICU, ccb pleural effusion c/b bleeding s/p VATS. Now extubated and transferred to medicine floor.   Endocrine consulted for T2DM mgmt with hyper and hypo glycemia    #T2DM with complications with hyper and hypoglycemia   episode of severe fasting hypoglycemia this AM to 38 due to basal insulin + decrease PO intake, has been on D5 IVF since episode with glucose levels in goal range. Poor PO intake per primary team.  - goal glucose 100-180  - recommend to hold all insulin for now   - continue D5 IVF for now while Lantus is still on board, can DC dextrose fluids this evening when basal insulin is out of system  - check FS AC/HS + 3 AM   - low pre-meal and low bedtime correction scales   - encourage PO intake   - carb consistent diet  Discharge  - Plan TBD    #HTN  goal BP<130/80  - manage per primary team    #HLD  - continue statin     #Hypothyroidism  - continue LT4 50 mcg daily  - monitor TFTs outpatient      DW Team    Alisha Chambers DO, Endocrine Fellow   Pager [phone] from 9-5PM. After hours and on weekends please call [phone].   66 yr old M with Type 2 DM A1C 6.6 c/b ESRD s/p DDRT on 3/21, CVA, CAD s/p CABG, Afib on apixaban, seizure activity, HTN, HLD, h/o GI bleed in 2/2022 EGD with duodenal ulcer, discharged to [facility] rehab center after prior hospitalization, now re-admitted from [facility] for seizures c/f status epilepticus in setting of UTI, requiring intubation for hypoxic respiratory failure and admission to MICU, ccb pleural effusion c/b bleeding s/p VATS. Now extubated and transferred to medicine floor.   Endocrine consulted for T2DM mgmt with hyper and hypo glycemia    #T2DM with complications with hyper and hypoglycemia   episode of severe fasting hypoglycemia this AM to 38 due to basal insulin + decrease PO intake, has been on D5 IVF since episode with glucose levels in goal range. Poor PO intake per primary team.  - goal glucose 100-180  - recommend to hold all standing insulin for now   - continue D5 IVF for now while Lantus is still on board, can DC dextrose fluids this evening when basal insulin is out of system  - check FS AC/HS + 3 AM   - low pre-meal and low bedtime correction scales   - encourage PO intake   - carb consistent diet  Discharge  - Plan TBD based on insulin requirements at DC. Outpatient f/u with Endocrinologist Dr. Lincoln.     #HTN  goal BP<130/80  - manage per primary team    #HLD  - continue statin     #Hypothyroidism  - continue LT4 50 mcg daily  - monitor TFTs outpatient      DW Team    Alisha Chambers DO, Endocrine Fellow   Pager [phone] from 9-5PM. After hours and on weekends please call [phone].

## 2022-06-18 NOTE — CONSULT NOTE ADULT - SUBJECTIVE AND OBJECTIVE BOX
HPI:    66 yr old M with Type 2 DM A1C 6.6 c/b ESRD s/p DDRT on 3/21, CVA, CAD s/p CABG, Afib on apixaban, seizure activity, HTN, HLD, h/o GI bleed in 2022 EGD with duodenal ulcer, discharged to [facility] rehab center after prior hospitalization, now re-admitted from [facility] for seizures c/f status epilepticus in setting of UTI, requiring intubation for hypoxic respiratory failure and admission to MICU, ccb pleural effusion c/b bleeding s/p VATS. Now extubated and transferred to medicine floor.   Endocrine consulted for T2DM mgmt with hyper and hypo glycemia    History obtained from patient and son at bedside. Diagnosed with DM2 about 30 years ago. Prior to renal transplant, pt was taking novolog 3-4 units TID pre-meal with low correction scale (was not on basal insulin). Post-transplant, pt required basal/bolus insulin with basal 7 units qhs, admelog 7 units TID pre-meal at end of hospitalization (was on prednisone taper at this time with taper to 5 mg daily on final day of hospitalization). Pt was discharged to [facility] rehab on these doses. Per conversation with RN at [facility], most recent insulin doses while at [facility] were Semglee 12 units qhs, humalog 5 units TID pre-meal. Per RN at [facility], FS values at rehab varied between 100s-200s mostly with one episode of hypoglycemia to 65 on day of re-admission to [facility]. Per son, pt was eating less at rehab as he didn't like the food. Has been eating well, finishing tray, during current hospitalization. Has free style eric 2 CGM at home to monitor BG.     Noted with episode of hypoglycemia to 46--38 this AM. Denies hypoglycemic symptoms at time of event.      Takes LT4 50 mcg daily for hypothyroidism. Denies recent dose changes. Reports adherence with medication, takes on empty stomach.     PAST MEDICAL & SURGICAL HISTORY:  Hypertension      Diabetes      Dyslipidemia      CAD (Coronary Artery Disease)  with Stents in 2009 and 2019, s/p off-pump C3L on 20      Hypothyroidism      CVA (cerebral vascular accident)  19 with residual bilateral weakness      Anemia      PEG (percutaneous endoscopic gastrostomy) status  removed 2020      Intubation of airway performed without difficulty  Dec 2019  CVA c/b status epilepticus requiring intubation and PEG in 2019-      ESRD on dialysis  M/W/F      Seizures  after CVA, last seizure was last week 22      History of insertion of stent into coronary artery bypass graft   and 2019      S/P CABG x 3  off pump C3L on 20          FAMILY HISTORY:  Family history of cancer of tongue (Father)  Parents are  . Father - at 80 years, tongue cancer was a smoker. Mother- at 71, had accident while crossing road. Siblings- 2 brothers and one sister.        Social History:  Denies ETOH use   Denies tobacco use    Outpatient Medications: Home Medications:  amLODIPine 10 mg oral tablet: 1 tab(s) enteral once a day (2022 15:45)  apixaban 2.5 mg oral tablet: 1 tab(s) orally 2 times a day (2022 15:45)  atorvastatin 40 mg oral tablet: 1 tab(s) orally once a day (at bedtime) (2022 15:45)  CellCept 500 mg oral tablet: 1 tab(s) orally 2 times a day (2022 15:45)  Coreg 6.25 mg oral tablet: 1 tab(s) orally 2 times a day (2022 15:45)  divalproex sodium 250 mg oral delayed release tablet: 1 tab(s) orally once a day (2022 15:45)  divalproex sodium 500 mg oral delayed release tablet: 1 tab(s) orally 2 times a day (2022 15:45)  Envarsus XR 1 mg oral tablet, extended release: 3 tab(s) by gastrostomy tube once a day (in the morning) (2022 15:45)  fluconazole 200 mg oral tablet: 1 tab(s) orally once a day (2022 15:45)  HumaLOG 100 units/mL injectable solution: 5 unit(s) injectable 3 times a day (2022 15:45)  insulin glargine 100 units/mL subcutaneous solution: 6 unit(s) subcutaneous once a day (at bedtime) (2022 14:27)  levETIRAcetam 250 mg oral tablet: 1 tab(s) orally 2 times a day (2022 15:45)  levothyroxine 50 mcg (0.05 mg) oral tablet: 1 tab(s) orally once a day (2022 15:45)  magnesium oxide 400 mg oral tablet: 1 tab(s) orally 3 times a day (with meals) (2022 15:45)  Multiple Vitamins oral capsule: 1 cap(s) orally once a day (2022 15:45)  pantoprazole 40 mg oral delayed release tablet: 1 tab(s) orally once a day (before a meal) (2022 15:45)  predniSONE: 5 milligram(s) orally once a day (2022 15:45)  senna oral tablet: 2 tab(s) orally once a day (at bedtime) (2022 15:45)  sulfamethoxazole-trimethoprim 400 mg-80 mg oral tablet: 1 tab(s) orally once a day (2022 15:45)  valGANciclovir 450 mg oral tablet: 1 tab(s) orally 3 times a week (2022 15:45)      MEDICATIONS  (STANDING):  atorvastatin 40 milliGRAM(s) Oral at bedtime  chlorhexidine 2% Cloths 1 Application(s) Topical <User Schedule>  dextrose 5%. 1000 milliLiter(s) (50 mL/Hr) IV Continuous <Continuous>  dextrose 5%. 1000 milliLiter(s) (50 mL/Hr) IV Continuous <Continuous>  dextrose 50% Injectable 25 Gram(s) IV Push once  dextrose 50% Injectable 12.5 Gram(s) IV Push once  dextrose 50% Injectable 25 Gram(s) IV Push once  diVALproex DR 1000 milliGRAM(s) Oral two times a day  ertapenem  IVPB 1000 milliGRAM(s) IV Intermittent every 24 hours  fluconAZOLE   Tablet 200 milliGRAM(s) Oral daily  insulin glargine Injectable (LANTUS) 16 Unit(s) SubCutaneous at bedtime  insulin lispro (ADMELOG) corrective regimen sliding scale   SubCutaneous three times a day before meals  insulin lispro (ADMELOG) corrective regimen sliding scale   SubCutaneous at bedtime  insulin lispro Injectable (ADMELOG) 4 Unit(s) SubCutaneous three times a day before meals  levETIRAcetam 250 milliGRAM(s) Oral two times a day  levothyroxine 50 MICROGram(s) Oral daily  magnesium oxide 400 milliGRAM(s) Oral every 8 hours  multivitamin 1 Tablet(s) Oral daily  mycophenolate mofetil 500 milliGRAM(s) Oral <User Schedule>  NIFEdipine XL 30 milliGRAM(s) Oral daily  pantoprazole    Tablet 40 milliGRAM(s) Oral before breakfast  polyethylene glycol 3350 17 Gram(s) Oral daily  predniSONE   Tablet 5 milliGRAM(s) Oral daily  senna 2 Tablet(s) Oral at bedtime  sodium bicarbonate 1300 milliGRAM(s) Oral three times a day  tacrolimus 0.5 milliGRAM(s) Oral <User Schedule>  valGANciclovir 450 milliGRAM(s) Oral <User Schedule>    MEDICATIONS  (PRN):  acetaminophen     Tablet .. 975 milliGRAM(s) Oral every 6 hours PRN Mild Pain (1 - 3)  dextrose Oral Gel 15 Gram(s) Oral once PRN Blood Glucose LESS THAN 70 milliGRAM(s)/deciliter      Allergies    No Known Allergies    Intolerances      Review of Systems:  Constitutional: No fever, chills   Neuro: No tremors, headache   Cardiovascular: No chest pain, palpitations  Respiratory: No SOB, no cough  GI: No nausea, vomiting, abdominal pain  : No dysuria, polyuria   Skin: no rash, ulcers   Psych: no depression, anxiety   Endocrine: no polyphagia, polydipsia     ALL OTHER SYSTEMS REVIEWED AND NEGATIVE        PHYSICAL EXAM:  VITALS: T(C): 36.4 (22 @ 09:00)  T(F): 97.5 (22 @ 09:00), Max: 98.2 (22 @ 01:00)  HR: 82 (22 @ 09:00) (71 - 95)  BP: 154/66 (22 @ 09:00) (136/67 - 162/77)  RR:  (18 - 20)  SpO2:  (95% - 99%)  Wt(kg): --  GENERAL: NAD, thin appearing  EYES: No proptosis, anicteric  HEENT:  Atraumatic, Normocephalic, moist mucous membranes  THYROID: Normal size, no palpable nodules  RESPIRATORY: Clear to auscultation bilaterally; No rales, rhonchi, wheezing  CARDIOVASCULAR: Regular rate and rhythm; No murmurs  GI: Soft, nontender, non distended, normal bowel sounds  SKIN: Dry, intact, No rashes or lesions  NEURO: AOx3, moves all extremities spontaneously   PSYCH: Reactive affect, euthymic mood    POCT Blood Glucose.: 136 mg/dL (22 @ 12:01)  POCT Blood Glucose.: 142 mg/dL (22 @ 09:22)  POCT Blood Glucose.: 38 mg/dL (22 @ 09:04)  POCT Blood Glucose.: 165 mg/dL (22 @ 02:07)  POCT Blood Glucose.: 111 mg/dL (22 @ 23:23)  POCT Blood Glucose.: 94 mg/dL (22 @ 22:52)  POCT Blood Glucose.: 71 mg/dL (22 @ 22:13)  POCT Blood Glucose.: 62 mg/dL (22 @ 21:39)  POCT Blood Glucose.: 79 mg/dL (22 @ 17:24)  POCT Blood Glucose.: 111 mg/dL (22 @ 12:14)  POCT Blood Glucose.: 123 mg/dL (22 @ 08:15)  POCT Blood Glucose.: 176 mg/dL (22 @ 21:12)  POCT Blood Glucose.: 104 mg/dL (22 @ 17:09)  POCT Blood Glucose.: 99 mg/dL (22 @ 16:11)  POCT Blood Glucose.: 129 mg/dL (22 @ 15:08)  POCT Blood Glucose.: 151 mg/dL (22 @ 14:17)  POCT Blood Glucose.: 119 mg/dL (22 @ 13:05)  POCT Blood Glucose.: 184 mg/dL (22 @ 12:04)  POCT Blood Glucose.: 230 mg/dL (22 @ 11:02)  POCT Blood Glucose.: 246 mg/dL (22 @ 10:06)  POCT Blood Glucose.: 219 mg/dL (22 @ 09:08)  POCT Blood Glucose.: 233 mg/dL (22 @ 08:02)  POCT Blood Glucose.: 293 mg/dL (22 @ 06:58)  POCT Blood Glucose.: 301 mg/dL (22 @ 05:56)  POCT Blood Glucose.: 327 mg/dL (22 @ 04:37)  POCT Blood Glucose.: 299 mg/dL (22 @ 01:26)  POCT Blood Glucose.: 290 mg/dL (06-15-22 @ 21:15)  POCT Blood Glucose.: 228 mg/dL (06-15-22 @ 17:21)                            10.1   6.25  )-----------( 222      ( 2022 06:49 )             33.0           137  |  106  |  42<H>  ----------------------------<  46<LL>  4.8   |  18<L>  |  1.76<H>    eGFR: 42<L>    Ca    8.5        Mg     2.0       Phos  2.8         TPro  6.1  /  Alb  2.3<L>  /  TBili  0.3  /  DBili  x   /  AST  25  /  ALT  12  /  AlkPhos  88        Thyroid Function Tests:              Radiology:                HPI:    66 yr old M with Type 2 DM A1C 6.6 c/b ESRD s/p DDRT on 3/21, CVA, CAD s/p CABG, Afib on apixaban, seizure activity, HTN, HLD, h/o GI bleed in 2022 EGD with duodenal ulcer, discharged to [facility] rehab center after prior hospitalization, now re-admitted from [facility] for seizures c/f status epilepticus in setting of UTI, requiring intubation for hypoxic respiratory failure and admission to MICU, ccb pleural effusion c/b bleeding s/p VATS. Now extubated and transferred to medicine floor.   Endocrine consulted for T2DM mgmt with hyper and hypo glycemia    History obtained from patient and son at bedside. Diagnosed with DM2 about 30 years ago. Prior to renal transplant, pt was taking novolog 3-4 units TID pre-meal with low correction scale (was not on basal insulin). Post-transplant, pt required basal/bolus insulin with basal 7 units qhs, admelog 7 units TID pre-meal at end of hospitalization (was on prednisone taper at this time with taper to 5 mg daily on final day of hospitalization). Pt was discharged to [facility] rehab on these doses. Per conversation with RN at [facility], most recent insulin doses while at [facility] were Semglee 12 units qhs, humalog 5 units TID pre-meal. Per RN at [facility], FS values at rehab varied between 100s-200s mostly with one episode of hypoglycemia to 65 on day of re-admission to [facility]. Per son, pt was eating less at rehab as he didn't like the food. Sib states pt has been eating well in hospital but per primary team pt has not been eating well. Has free style eric 2 CGM at home to monitor BG.     Noted with episode of hypoglycemia to 46--38 this AM. Denies hypoglycemic symptoms at time of event.      Takes LT4 50 mcg daily for hypothyroidism. Denies recent dose changes. Reports adherence with medication, takes on empty stomach.     PAST MEDICAL & SURGICAL HISTORY:  Hypertension      Diabetes      Dyslipidemia      CAD (Coronary Artery Disease)  with Stents in 2009 and 2019, s/p off-pump C3L on 20      Hypothyroidism      CVA (cerebral vascular accident)  19 with residual bilateral weakness      Anemia      PEG (percutaneous endoscopic gastrostomy) status  removed 2020      Intubation of airway performed without difficulty  Dec 2019  CVA c/b status epilepticus requiring intubation and PEG in 2019-      ESRD on dialysis  M/W/F      Seizures  after CVA, last seizure was last week 22      History of insertion of stent into coronary artery bypass graft   and       S/P CABG x 3  off pump C3L on 20          FAMILY HISTORY:  Family history of cancer of tongue (Father)  Parents are  . Father - at 80 years, tongue cancer was a smoker. Mother- at 71, had accident while crossing road. Siblings- 2 brothers and one sister.        Social History:  Denies ETOH use   Denies tobacco use    Outpatient Medications: Home Medications:  amLODIPine 10 mg oral tablet: 1 tab(s) enteral once a day (2022 15:45)  apixaban 2.5 mg oral tablet: 1 tab(s) orally 2 times a day (2022 15:45)  atorvastatin 40 mg oral tablet: 1 tab(s) orally once a day (at bedtime) (2022 15:45)  CellCept 500 mg oral tablet: 1 tab(s) orally 2 times a day (2022 15:45)  Coreg 6.25 mg oral tablet: 1 tab(s) orally 2 times a day (2022 15:45)  divalproex sodium 250 mg oral delayed release tablet: 1 tab(s) orally once a day (2022 15:45)  divalproex sodium 500 mg oral delayed release tablet: 1 tab(s) orally 2 times a day (2022 15:45)  Envarsus XR 1 mg oral tablet, extended release: 3 tab(s) by gastrostomy tube once a day (in the morning) (2022 15:45)  fluconazole 200 mg oral tablet: 1 tab(s) orally once a day (2022 15:45)  HumaLOG 100 units/mL injectable solution: 5 unit(s) injectable 3 times a day (2022 15:45)  insulin glargine 100 units/mL subcutaneous solution: 6 unit(s) subcutaneous once a day (at bedtime) (2022 14:27)  levETIRAcetam 250 mg oral tablet: 1 tab(s) orally 2 times a day (2022 15:45)  levothyroxine 50 mcg (0.05 mg) oral tablet: 1 tab(s) orally once a day (2022 15:45)  magnesium oxide 400 mg oral tablet: 1 tab(s) orally 3 times a day (with meals) (2022 15:45)  Multiple Vitamins oral capsule: 1 cap(s) orally once a day (2022 15:45)  pantoprazole 40 mg oral delayed release tablet: 1 tab(s) orally once a day (before a meal) (2022 15:45)  predniSONE: 5 milligram(s) orally once a day (2022 15:45)  senna oral tablet: 2 tab(s) orally once a day (at bedtime) (2022 15:45)  sulfamethoxazole-trimethoprim 400 mg-80 mg oral tablet: 1 tab(s) orally once a day (2022 15:45)  valGANciclovir 450 mg oral tablet: 1 tab(s) orally 3 times a week (2022 15:45)      MEDICATIONS  (STANDING):  atorvastatin 40 milliGRAM(s) Oral at bedtime  chlorhexidine 2% Cloths 1 Application(s) Topical <User Schedule>  dextrose 5%. 1000 milliLiter(s) (50 mL/Hr) IV Continuous <Continuous>  dextrose 5%. 1000 milliLiter(s) (50 mL/Hr) IV Continuous <Continuous>  dextrose 50% Injectable 25 Gram(s) IV Push once  dextrose 50% Injectable 12.5 Gram(s) IV Push once  dextrose 50% Injectable 25 Gram(s) IV Push once  diVALproex DR 1000 milliGRAM(s) Oral two times a day  ertapenem  IVPB 1000 milliGRAM(s) IV Intermittent every 24 hours  fluconAZOLE   Tablet 200 milliGRAM(s) Oral daily  insulin glargine Injectable (LANTUS) 16 Unit(s) SubCutaneous at bedtime  insulin lispro (ADMELOG) corrective regimen sliding scale   SubCutaneous three times a day before meals  insulin lispro (ADMELOG) corrective regimen sliding scale   SubCutaneous at bedtime  insulin lispro Injectable (ADMELOG) 4 Unit(s) SubCutaneous three times a day before meals  levETIRAcetam 250 milliGRAM(s) Oral two times a day  levothyroxine 50 MICROGram(s) Oral daily  magnesium oxide 400 milliGRAM(s) Oral every 8 hours  multivitamin 1 Tablet(s) Oral daily  mycophenolate mofetil 500 milliGRAM(s) Oral <User Schedule>  NIFEdipine XL 30 milliGRAM(s) Oral daily  pantoprazole    Tablet 40 milliGRAM(s) Oral before breakfast  polyethylene glycol 3350 17 Gram(s) Oral daily  predniSONE   Tablet 5 milliGRAM(s) Oral daily  senna 2 Tablet(s) Oral at bedtime  sodium bicarbonate 1300 milliGRAM(s) Oral three times a day  tacrolimus 0.5 milliGRAM(s) Oral <User Schedule>  valGANciclovir 450 milliGRAM(s) Oral <User Schedule>    MEDICATIONS  (PRN):  acetaminophen     Tablet .. 975 milliGRAM(s) Oral every 6 hours PRN Mild Pain (1 - 3)  dextrose Oral Gel 15 Gram(s) Oral once PRN Blood Glucose LESS THAN 70 milliGRAM(s)/deciliter      Allergies    No Known Allergies    Intolerances      Review of Systems:  Constitutional: No fever, chills   Neuro: No tremors, headache   Cardiovascular: No chest pain, palpitations  Respiratory: No SOB, no cough  GI: No nausea, vomiting, abdominal pain  : No dysuria, polyuria   Skin: no rash, ulcers   Psych: no depression, anxiety   Endocrine: no polyphagia, polydipsia     ALL OTHER SYSTEMS REVIEWED AND NEGATIVE        PHYSICAL EXAM:  VITALS: T(C): 36.4 (22 @ 09:00)  T(F): 97.5 (22 @ 09:00), Max: 98.2 (22 @ 01:00)  HR: 82 (22 @ 09:00) (71 - 95)  BP: 154/66 (22 @ 09:00) (136/67 - 162/77)  RR:  (18 - 20)  SpO2:  (95% - 99%)  Wt(kg): --  GENERAL: NAD, thin appearing  EYES: No proptosis, anicteric  HEENT:  Atraumatic, Normocephalic, moist mucous membranes  THYROID: Normal size, no palpable nodules  RESPIRATORY: Clear to auscultation bilaterally; No rales, rhonchi, wheezing  CARDIOVASCULAR: Regular rate and rhythm; No murmurs  GI: Soft, nontender, non distended, normal bowel sounds  SKIN: Dry, intact, No rashes or lesions  NEURO: AOx3, moves all extremities spontaneously   PSYCH: Reactive affect, euthymic mood    POCT Blood Glucose.: 136 mg/dL (22 @ 12:01)  POCT Blood Glucose.: 142 mg/dL (22 @ 09:22)  POCT Blood Glucose.: 38 mg/dL (22 @ 09:04)  POCT Blood Glucose.: 165 mg/dL (22 @ 02:07)  POCT Blood Glucose.: 111 mg/dL (22 @ 23:23)  POCT Blood Glucose.: 94 mg/dL (22 @ 22:52)  POCT Blood Glucose.: 71 mg/dL (22 @ 22:13)  POCT Blood Glucose.: 62 mg/dL (22 @ 21:39)  POCT Blood Glucose.: 79 mg/dL (22 @ 17:24)  POCT Blood Glucose.: 111 mg/dL (22 @ 12:14)  POCT Blood Glucose.: 123 mg/dL (22 @ 08:15)  POCT Blood Glucose.: 176 mg/dL (22 @ 21:12)  POCT Blood Glucose.: 104 mg/dL (22 @ 17:09)  POCT Blood Glucose.: 99 mg/dL (22 @ 16:11)  POCT Blood Glucose.: 129 mg/dL (22 @ 15:08)  POCT Blood Glucose.: 151 mg/dL (22 @ 14:17)  POCT Blood Glucose.: 119 mg/dL (22 @ 13:05)  POCT Blood Glucose.: 184 mg/dL (22 @ 12:04)  POCT Blood Glucose.: 230 mg/dL (22 @ 11:02)  POCT Blood Glucose.: 246 mg/dL (22 @ 10:06)  POCT Blood Glucose.: 219 mg/dL (22 @ 09:08)  POCT Blood Glucose.: 233 mg/dL (22 @ 08:02)  POCT Blood Glucose.: 293 mg/dL (22 @ 06:58)  POCT Blood Glucose.: 301 mg/dL (22 @ 05:56)  POCT Blood Glucose.: 327 mg/dL (22 @ 04:37)  POCT Blood Glucose.: 299 mg/dL (22 @ 01:26)  POCT Blood Glucose.: 290 mg/dL (06-15-22 @ 21:15)  POCT Blood Glucose.: 228 mg/dL (06-15-22 @ 17:21)                            10.1   6.25  )-----------( 222      ( 2022 06:49 )             33.0           137  |  106  |  42<H>  ----------------------------<  46<LL>  4.8   |  18<L>  |  1.76<H>    eGFR: 42<L>    Ca    8.5        Mg     2.0       Phos  2.8         TPro  6.1  /  Alb  2.3<L>  /  TBili  0.3  /  DBili  x   /  AST  25  /  ALT  12  /  AlkPhos  88        Thyroid Function Tests:              Radiology:                HPI:    66 yr old M with Type 2 DM A1C 6.6 c/b ESRD s/p DDRT on 3/21, CVA, CAD s/p CABG, Afib on apixaban, seizure activity, HTN, HLD, h/o GI bleed in 2022 EGD with duodenal ulcer, discharged to [facility] rehab center after prior hospitalization, now re-admitted from [facility] for seizures c/f status epilepticus in setting of UTI, requiring intubation for hypoxic respiratory failure and admission to MICU, ccb pleural effusion c/b bleeding s/p VATS. Now extubated and transferred to medicine floor.   Endocrine consulted for T2DM mgmt with hyper and hypo glycemia    History obtained from patient and son at bedside. Diagnosed with DM2 about 30 years ago. Prior to renal transplant, pt was taking novolog 3-4 units TID pre-meal with low correction scale (was not on basal insulin). Post-transplant, pt required basal/bolus insulin with basal 7 units qhs, admelog 7 units TID pre-meal at end of hospitalization (was on prednisone taper at this time with taper to 5 mg daily on final day of hospitalization). Pt was discharged to [facility] rehab on these doses. Per conversation with RN at [facility], most recent insulin doses while at [facility] were Semglee 12 units qhs, humalog 5 units TID pre-meal. Per RN at [facility], FS values at rehab varied between 100s-200s mostly with one episode of hypoglycemia to 65 on day of re-admission to [facility]. Per son, pt was eating less at rehab as he didn't like the food. Sib states pt has been eating well in hospital but per primary team pt has not been eating well. Has free style eric 2 CGM at home to monitor BG.     Noted with episode of hypoglycemia to 46--38 this AM. Denies hypoglycemic symptoms at time of event.      Takes LT4 50 mcg daily for hypothyroidism. Denies recent dose changes. Reports adherence with medication, takes on empty stomach.     PAST MEDICAL & SURGICAL HISTORY:  Hypertension      Diabetes      Dyslipidemia      CAD (Coronary Artery Disease)  with Stents in 2009 and 2019, s/p off-pump C3L on 20      Hypothyroidism      CVA (cerebral vascular accident)  19 with residual bilateral weakness      Anemia      PEG (percutaneous endoscopic gastrostomy) status  removed 2020      Intubation of airway performed without difficulty  Dec 2019  CVA c/b status epilepticus requiring intubation and PEG in 2019-      ESRD on dialysis  M/W/F      Seizures  after CVA, last seizure was last week 22      History of insertion of stent into coronary artery bypass graft   and       S/P CABG x 3  off pump C3L on 20          FAMILY HISTORY:  Family history of cancer of tongue (Father)  Parents are  . Father - at 80 years, tongue cancer was a smoker. Mother- at 71, had accident while crossing road. Siblings- 2 brothers and one sister.        Social History:  Denies ETOH use   Denies tobacco use    Outpatient Medications: Home Medications:  amLODIPine 10 mg oral tablet: 1 tab(s) enteral once a day (2022 15:45)  apixaban 2.5 mg oral tablet: 1 tab(s) orally 2 times a day (2022 15:45)  atorvastatin 40 mg oral tablet: 1 tab(s) orally once a day (at bedtime) (2022 15:45)  CellCept 500 mg oral tablet: 1 tab(s) orally 2 times a day (2022 15:45)  Coreg 6.25 mg oral tablet: 1 tab(s) orally 2 times a day (2022 15:45)  divalproex sodium 250 mg oral delayed release tablet: 1 tab(s) orally once a day (2022 15:45)  divalproex sodium 500 mg oral delayed release tablet: 1 tab(s) orally 2 times a day (2022 15:45)  Envarsus XR 1 mg oral tablet, extended release: 3 tab(s) by gastrostomy tube once a day (in the morning) (2022 15:45)  fluconazole 200 mg oral tablet: 1 tab(s) orally once a day (2022 15:45)  HumaLOG 100 units/mL injectable solution: 5 unit(s) injectable 3 times a day (2022 15:45)  insulin glargine 100 units/mL subcutaneous solution: 6 unit(s) subcutaneous once a day (at bedtime) (2022 14:27)  levETIRAcetam 250 mg oral tablet: 1 tab(s) orally 2 times a day (2022 15:45)  levothyroxine 50 mcg (0.05 mg) oral tablet: 1 tab(s) orally once a day (2022 15:45)  magnesium oxide 400 mg oral tablet: 1 tab(s) orally 3 times a day (with meals) (2022 15:45)  Multiple Vitamins oral capsule: 1 cap(s) orally once a day (2022 15:45)  pantoprazole 40 mg oral delayed release tablet: 1 tab(s) orally once a day (before a meal) (2022 15:45)  predniSONE: 5 milligram(s) orally once a day (2022 15:45)  senna oral tablet: 2 tab(s) orally once a day (at bedtime) (2022 15:45)  sulfamethoxazole-trimethoprim 400 mg-80 mg oral tablet: 1 tab(s) orally once a day (2022 15:45)  valGANciclovir 450 mg oral tablet: 1 tab(s) orally 3 times a week (2022 15:45)      MEDICATIONS  (STANDING):  atorvastatin 40 milliGRAM(s) Oral at bedtime  chlorhexidine 2% Cloths 1 Application(s) Topical <User Schedule>  dextrose 5%. 1000 milliLiter(s) (50 mL/Hr) IV Continuous <Continuous>  dextrose 5%. 1000 milliLiter(s) (50 mL/Hr) IV Continuous <Continuous>  dextrose 50% Injectable 25 Gram(s) IV Push once  dextrose 50% Injectable 12.5 Gram(s) IV Push once  dextrose 50% Injectable 25 Gram(s) IV Push once  diVALproex DR 1000 milliGRAM(s) Oral two times a day  ertapenem  IVPB 1000 milliGRAM(s) IV Intermittent every 24 hours  fluconAZOLE   Tablet 200 milliGRAM(s) Oral daily  insulin glargine Injectable (LANTUS) 16 Unit(s) SubCutaneous at bedtime  insulin lispro (ADMELOG) corrective regimen sliding scale   SubCutaneous three times a day before meals  insulin lispro (ADMELOG) corrective regimen sliding scale   SubCutaneous at bedtime  insulin lispro Injectable (ADMELOG) 4 Unit(s) SubCutaneous three times a day before meals  levETIRAcetam 250 milliGRAM(s) Oral two times a day  levothyroxine 50 MICROGram(s) Oral daily  magnesium oxide 400 milliGRAM(s) Oral every 8 hours  multivitamin 1 Tablet(s) Oral daily  mycophenolate mofetil 500 milliGRAM(s) Oral <User Schedule>  NIFEdipine XL 30 milliGRAM(s) Oral daily  pantoprazole    Tablet 40 milliGRAM(s) Oral before breakfast  polyethylene glycol 3350 17 Gram(s) Oral daily  predniSONE   Tablet 5 milliGRAM(s) Oral daily  senna 2 Tablet(s) Oral at bedtime  sodium bicarbonate 1300 milliGRAM(s) Oral three times a day  tacrolimus 0.5 milliGRAM(s) Oral <User Schedule>  valGANciclovir 450 milliGRAM(s) Oral <User Schedule>    MEDICATIONS  (PRN):  acetaminophen     Tablet .. 975 milliGRAM(s) Oral every 6 hours PRN Mild Pain (1 - 3)  dextrose Oral Gel 15 Gram(s) Oral once PRN Blood Glucose LESS THAN 70 milliGRAM(s)/deciliter      Allergies    No Known Allergies    Intolerances      Review of Systems:  Constitutional: No fever, chills   Neuro: No tremors, headache   Cardiovascular: No chest pain, palpitations  Respiratory: No SOB, no cough  GI: No nausea, vomiting, abdominal pain  : No dysuria, polyuria   Skin: no rash, ulcers   Psych: no depression, anxiety   Endocrine: no polyphagia, polydipsia     ALL OTHER SYSTEMS REVIEWED AND NEGATIVE        PHYSICAL EXAM:  VITALS: T(C): 36.4 (22 @ 09:00)  T(F): 97.5 (22 @ 09:00), Max: 98.2 (22 @ 01:00)  HR: 82 (22 @ 09:00) (71 - 95)  BP: 154/66 (22 @ 09:00) (136/67 - 162/77)  RR:  (18 - 20)  SpO2:  (95% - 99%)  Wt(kg): --  GENERAL: NAD, thin appearing  EYES: No proptosis, anicteric  HEENT:  Atraumatic, Normocephalic  THYROID: Normal size  RESPIRATORY: no respiratory distress, no accessory muscle use   GI: Soft, nontender, non distended  SKIN: Dry, intact,  NEURO: Awake, alert moves all extremities spontaneously   PSYCH: Reactive affect, euthymic mood    POCT Blood Glucose.: 136 mg/dL (22 @ 12:01)  POCT Blood Glucose.: 142 mg/dL (22 @ 09:22)  POCT Blood Glucose.: 38 mg/dL (22 @ 09:04)  POCT Blood Glucose.: 165 mg/dL (22 @ 02:07)  POCT Blood Glucose.: 111 mg/dL (22 @ 23:23)  POCT Blood Glucose.: 94 mg/dL (22 @ 22:52)  POCT Blood Glucose.: 71 mg/dL (22 @ 22:13)  POCT Blood Glucose.: 62 mg/dL (22 @ 21:39)  POCT Blood Glucose.: 79 mg/dL (22 @ 17:24)  POCT Blood Glucose.: 111 mg/dL (22 @ 12:14)  POCT Blood Glucose.: 123 mg/dL (22 @ 08:15)  POCT Blood Glucose.: 176 mg/dL (22 @ 21:12)  POCT Blood Glucose.: 104 mg/dL (22 @ 17:09)  POCT Blood Glucose.: 99 mg/dL (22 @ 16:11)  POCT Blood Glucose.: 129 mg/dL (22 @ 15:08)  POCT Blood Glucose.: 151 mg/dL (22 @ 14:17)  POCT Blood Glucose.: 119 mg/dL (22 @ 13:05)  POCT Blood Glucose.: 184 mg/dL (22 @ 12:04)  POCT Blood Glucose.: 230 mg/dL (22 @ 11:02)  POCT Blood Glucose.: 246 mg/dL (22 @ 10:06)  POCT Blood Glucose.: 219 mg/dL (22 @ 09:08)  POCT Blood Glucose.: 233 mg/dL (22 @ 08:02)  POCT Blood Glucose.: 293 mg/dL (22 @ 06:58)  POCT Blood Glucose.: 301 mg/dL (22 @ 05:56)  POCT Blood Glucose.: 327 mg/dL (22 @ 04:37)  POCT Blood Glucose.: 299 mg/dL (22 @ 01:26)  POCT Blood Glucose.: 290 mg/dL (06-15-22 @ 21:15)  POCT Blood Glucose.: 228 mg/dL (06-15-22 @ 17:21)                            10.1   6.25  )-----------( 222      ( 2022 06:49 )             33.0           137  |  106  |  42<H>  ----------------------------<  46<LL>  4.8   |  18<L>  |  1.76<H>    eGFR: 42<L>    Ca    8.5        Mg     2.0       Phos  2.8         TPro  6.1  /  Alb  2.3<L>  /  TBili  0.3  /  DBili  x   /  AST  25  /  ALT  12  /  AlkPhos  88        Thyroid Function Tests:              Radiology:

## 2022-06-18 NOTE — PROGRESS NOTE ADULT - SUBJECTIVE AND OBJECTIVE BOX
Transplant Surgery Progress Note   --------------------------------------------------------------  DCD DDRT     Date: 4/21/22    Present: Patient seen and examined with multidisciplinary team including Transplant Surgeon: Dr. Mann, Nephrologist: Dr. Waqar Lomeli, WALDO Esparza and unit RN during am rounds.  Disciplines not in attendance will be notified of the plan.     HPI: 66 yr old man with PMH: DM type II, HTN, CAD s/p CABG in 2020, Afib on Eliquis, CVA in 2019 due to Afib, Seizure d/o following CVA last episode was on 4/8/22, h/o GI bleed in 2/2022 EGD with duodenal ulcer.  ESRD due to DM was on HD since 2019.     s/p DCD DDRT on 4/21/22.  Donor was 58 , KDPI 82%, DCD, single vessels and ureter, HLA mismatch 1, 2, 2. No DSA, cPRA 0%. 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 Re-admitted in May for anemia in the setting of large perinephric hematoma s/p evacuation and repair of arterial anastomosis on 5/2/22. Intra operative biopsy showed no rejection, Creatinine ranging ~2mg/dL.    In Rehab: He was recently dx with klebsiella UTI rx with ertapenem - then switched to Levaquin day #6.    He also had parainfluenza pneumonia. Was at rehab progressing well.      Hospital course:  - 6/10: transferred from rehab facility for seizure status epilepticus. Intubated for respiratory protection and transferred to MICU.     EEG showed no seizure activity. Neuro consulted.   - 6/11: Extubated. Found to have R pleural effusion. s/p Thoracentesis 1.3L. Eliquis changed to heparin drip.    Post procedure drop in H&H. 5u PRBC, 2 u FFP.    - 6/11 evening Transferred to SICU for further care in setting of hypoxia, significant R pleural effusion, hgb 6 and hemodynamic instability  - 6/11 Intubated at 9pm and sedated on Precedex.  CT placed, 600ml blood drained.  1L total overnight, started pressors  - 6/11 Received Protamine for PTT >100 (was on Heparin drip started for AF), 2 u FFP and 5u PRBC total  - 6/13 s/p VATS 2.2L old blood removed; second chest tube placed     Interval Events:  - afebrile, stable VSS  - transferred to  yesterday  - Hypoglycemic yest with FS ~60. Lantus was reduced to 16u (from 32u) last night   - This am hypoglycemic to 38, received dextrose. Started D5W, Endocrine consulted     Immunosuppression:   Induction:  Thymoglobulin                                        Maintenance immunosuppression: FK 0.5mgh bid , MMF 500mg BID, Pred 5  Ongoing monitoring for signs of rejection.    Potential Discharge date: pending clinical improvement  Education:  Medications  Plan of care:  See Below    MEDICATIONS  (STANDING):  atorvastatin 40 milliGRAM(s) Oral at bedtime  chlorhexidine 2% Cloths 1 Application(s) Topical <User Schedule>  dextrose 5%. 1000 milliLiter(s) (50 mL/Hr) IV Continuous <Continuous>  dextrose 5%. 1000 milliLiter(s) (50 mL/Hr) IV Continuous <Continuous>  dextrose 50% Injectable 25 Gram(s) IV Push once  dextrose 50% Injectable 12.5 Gram(s) IV Push once  dextrose 50% Injectable 25 Gram(s) IV Push once  diVALproex DR 1000 milliGRAM(s) Oral two times a day  ertapenem  IVPB 1000 milliGRAM(s) IV Intermittent every 24 hours  fluconAZOLE   Tablet 200 milliGRAM(s) Oral daily  insulin glargine Injectable (LANTUS) 16 Unit(s) SubCutaneous at bedtime  insulin lispro (ADMELOG) corrective regimen sliding scale   SubCutaneous three times a day before meals  insulin lispro (ADMELOG) corrective regimen sliding scale   SubCutaneous at bedtime  insulin lispro Injectable (ADMELOG) 4 Unit(s) SubCutaneous three times a day before meals  levETIRAcetam 250 milliGRAM(s) Oral two times a day  levothyroxine 50 MICROGram(s) Oral daily  magnesium oxide 400 milliGRAM(s) Oral every 8 hours  multivitamin 1 Tablet(s) Oral daily  mycophenolate mofetil 500 milliGRAM(s) Oral <User Schedule>  NIFEdipine XL 30 milliGRAM(s) Oral daily  pantoprazole    Tablet 40 milliGRAM(s) Oral before breakfast  polyethylene glycol 3350 17 Gram(s) Oral daily  predniSONE   Tablet 5 milliGRAM(s) Oral daily  senna 2 Tablet(s) Oral at bedtime  sodium bicarbonate 1300 milliGRAM(s) Oral three times a day  tacrolimus 0.5 milliGRAM(s) Oral <User Schedule>  valGANciclovir 450 milliGRAM(s) Oral <User Schedule>    MEDICATIONS  (PRN):  acetaminophen     Tablet .. 975 milliGRAM(s) Oral every 6 hours PRN Mild Pain (1 - 3)  dextrose Oral Gel 15 Gram(s) Oral once PRN Blood Glucose LESS THAN 70 milliGRAM(s)/deciliter      PAST MEDICAL & SURGICAL HISTORY:  Hypertension  Diabetes  Dyslipidemia  CAD (Coronary Artery Disease)  with Stents in 06/2009 and 6/2019, s/p off-pump C3L on 8/13/20  Hypothyroidism  CVA (cerebral vascular accident)  12/13/19 with residual bilateral weakness  Anemia  PEG (percutaneous endoscopic gastrostomy) status  removed July 2020  Intubation of airway performed without difficulty  Dec 2019  CVA c/b status epilepticus requiring intubation and PEG in 12/2019-  ESRD on dialysis M/W/F  Seizures  after CVA, last seizure was last week 4/07/22  History of insertion of stent into coronary artery bypass graft  2009 and 2019  S/P CABG x 3  off pump C3L on 8/13/20    Vital Signs Last 24 Hrs  T(C): 36.4 (18 Jun 2022 09:00), Max: 36.8 (18 Jun 2022 01:00)  T(F): 97.5 (18 Jun 2022 09:00), Max: 98.2 (18 Jun 2022 01:00)  HR: 82 (18 Jun 2022 09:00) (71 - 95)  BP: 154/66 (18 Jun 2022 09:00) (136/67 - 162/77)  BP(mean): --  RR: 18 (18 Jun 2022 09:00) (18 - 20)  SpO2: 95% (18 Jun 2022 09:00) (95% - 100%)    I&O's Summary    17 Jun 2022 07:01  -  18 Jun 2022 07:00  --------------------------------------------------------  IN: 1750 mL / OUT: 1382 mL / NET: 368 mL    18 Jun 2022 07:01  -  18 Jun 2022 11:34  --------------------------------------------------------  IN: 250 mL / OUT: 0 mL / NET: 250 mL                        10.1   6.25  )-----------( 222      ( 18 Jun 2022 06:49 )             33.0     06-18    137  |  106  |  42<H>  ----------------------------<  46<LL>  4.8   |  18<L>  |  1.76<H>    Ca    8.5      18 Jun 2022 06:47  Phos  2.8     06-18  Mg     2.0     06-18    TPro  6.1  /  Alb  2.3<L>  /  TBili  0.3  /  DBili  x   /  AST  25  /  ALT  12  /  AlkPhos  88  06-18    Tacrolimus (), Serum: 6.1 ng/mL (06-17 @ 05:58)      REVIEW OF SYSTEMS  --------------------------------------------------------------------------------  Gen: No weight changes, fatigue, fevers/chills, weakness  Skin: No rashes  Head/Eyes/Ears/Mouth: No headache; Normal hearing; Normal vision w/o blurriness; No sinus pain/discomfort, sore throat  Respiratory: No dyspnea, cough, wheezing, hemoptysis. Some pain at CT sites  CV: No chest pain, PND, orthopnea  GI: No abdominal pain, diarrhea, constipation, nausea, vomiting, melena, hematochezia  : No increased frequency, dysuria, hematuria, nocturia  MSK: No joint pain/swelling; no back pain; no edema  Neuro: No dizziness/lightheadedness, weakness, seizures, numbness, tingling  Heme: No easy bruising or bleeding  Endo: No heat/cold intolerance  Psych: No significant nervousness, anxiety, stress, depression  All other systems were reviewed and are negative, except as noted.    PHYSICAL EXAM:  Constitutional: Well developed / well nourished  Eyes: Anicteric, PERRLA  ENMT: nc/at  Neck: supple  Respiratory: CTA anterior. R CT x2 to WS  Cardiovascular: RRR  Gastrointestinal: Soft, non distended, NT, incision healed   Genitourinary: tucker  Extremities: SCD's in place and working bilaterally, no edema  Vascular: Palpable dp pulses bilaterally  Neurological: A&O x3  Skin: no rashes, ulcerations or lesions  Musculoskeletal: Moving all extremities  Psychiatric: Responsive

## 2022-06-18 NOTE — CONSULT NOTE ADULT - ATTENDING COMMENTS
complicated pt with diabetes post transplant on varying insulin doses. Recent hypos, agree with trial of lower doses.

## 2022-06-18 NOTE — PROVIDER CONTACT NOTE (OTHER) - ACTION/TREATMENT ORDERED:
NP Yina Hyman made aware. Pt. assessed by TAMMY Hyman at bedside, pt. was reoriented. Pt. answer LOC questions correctly, pt. became A&Ox3, oriented to person, place, time. Updated plan of care.

## 2022-06-19 NOTE — CHART NOTE - NSCHARTNOTEFT_GEN_A_CORE
Nutrition Follow Up Note  Patient seen for: nutrition consult when transferred to 6 Mon for "stage II pressure injury or >" and calorie count consult.    Chart reviewed, events noted. "66 year old Male with PMHx ESRD s/p permacath removal 5/10/22 s/p Rt DDRT 22,  CVA (), Afib on apixaban, seizure activity, CAD s/p stents, CABG (), HTN, HLD, DM on insulin, gastric/duodenal ulcer, recent hospitalization 22 -5/10/22 with weakness/anemia found to have perinephric hematoma requiring evacuation and repair of bleeding arterial anastomosis who presented to [facility] for status epilepticus requiring intubation for hypoxic respiratory failure."    Interim Events:  - transferred to [address] [place] [address] on   - endo consult , pt had episode of hypoglycemia (38mg/dL). Basal insulin d/c'd  - D5W started , d/c'd today, FS in 200s    Source: [] Patient       [x] EMR        [x] RN        [x] Family at bedside       [] Other: Per chart, A&O 2-3, lethargic, asleep at time of visit    Diet Order:   Diet, Regular:   Consistent Carbohydrate {Evening Snack} (CSTCHOSN)  Soft and Bite Sized (SOFTBTSZ)  Mildly Thick Liquids (MILDTHICKLIQS)  Supplement Feeding Modality:  Oral  Ensure Pudding Cans or Servings Per Day:  1       Frequency:  Three Times a day (06-15-22)    - Is current order appropriate/adequate? Pt's son inquires about advancing diet textures to regular foods, which is what pt was eating PTA. No swallow eval noted yet this admission    Diet History:  6/10-: NPO  : diet advanced, assisted with feeding    PO intake :  Good PO intake, >75% per son and RN. Son reports pt has been eating most of items on tray, if not all items, for a few days now. Pt requires feeding assistance and eats when assisted. Pt enjoys the Ensure puddings, also likes the diet mighty shakes. Son requests pt to receive Nepro shakes BID as he was at home PTA.     Nutrition-related concerns:  - Renal (per chart): "ESRD s/p DDRT (22) c/b DGF requiring HD (last session 22) & large perinephric hematoma s/p evacuation w/ revision of arterial anastomosis (22)"  - Post-op hyperkalemia improved; sodium bicarbonate ordered 1300mg tid ()  - Insulin regimen decreased to SSI before meals/bedtime  - Multivitamin ordered    GI:    Last BM .   Bowel Regimen? [X] Yes: Miralax, senna, Mag-Ox    Weights:   Daily Weight in k.1 ()- ?accuracy, Daily Weight in k.1 (), Weight in k.3 (), Weight in k.9 (), Weight in k.5 (), Weight in k.7 (06-10)  DOSIN.6 kg   IBW: 75.2 kg      Nutritionally Pertinent MEDICATIONS  (STANDING):  atorvastatin  dextrose 5%.  dextrose 50% Injectable  dextrose 50% Injectable  dextrose 50% Injectable  fluconAZOLE   Tablet  insulin lispro (ADMELOG) corrective regimen sliding scale  insulin lispro (ADMELOG) corrective regimen sliding scale  levothyroxine  magnesium oxide  multivitamin  NIFEdipine XL  pantoprazole    Tablet  polyethylene glycol 3350  predniSONE   Tablet  senna  sodium bicarbonate  valGANciclovir    Pertinent Labs:  @ 06:19: Na 136, BUN 36<H>, Cr 1.68<H>, <H>, K+ 4.1, Phos 2.5, Mg 1.7, Alk Phos 72, ALT/SGPT 9<L>, AST/SGOT 17, HbA1c --    A1C with Estimated Average Glucose Result: 6.6 % (22 @ 17:12)  A1C with Estimated Average Glucose Result: 7.3 % (22 @ 13:42)  A1C with Estimated Average Glucose Result: 7.2 % (22 @ 05:48)    Finger Sticks:  POCT Blood Glucose.: 137 mg/dL ( @ 12:56)  POCT Blood Glucose.: 205 mg/dL ( @ 08:36)  POCT Blood Glucose.: 122 mg/dL ( @ 21:19)  POCT Blood Glucose.: 101 mg/dL (06-18 @ 18:09)  POCT Blood Glucose.: 70 mg/dL ( @ 17:21)  POCT Blood Glucose.: 72 mg/dL ( @ 17:19)        Skin per nursing documentation: stage II sacrum (present on admission), b/l buttock DTI  Edema: 1+ generalized, 2+ R hand    Estimated Needs: based on dosing wt 61.7kg, with consideration for malnutrition, BMI 19.5 kg/m2  Energy: 5332-1574 calories (30-35 kcal/kg)  Protein: 74-92 grams (1.2-1.5 gm/kg)    Previous Nutrition Diagnosis: Severe Malnutrition; Increased nutrient needs  Nutrition Diagnosis is: [X] ongoing; addressed with therapeutic diet and oral supplements    New Nutrition Diagnosis: n/a    Nutrition Care Plan:  [X] In Progress  [] Achieved  [] Not applicable    Nutrition Interventions:     Education Provided:       [] Yes:  [X] No: not appropriate       Recommendations:         1) Consider swallowing evaluation to assess if pt can resume regular textured foods and thin liquids.      2) Continue Consistent Carbohydrate diet with snack; remove Puddings secondary to high carb/sugar content and pt with good PO intake. Will add diet puddings     3) Add Nepro BID please      4) Continuing Nectar Consistency No Sugar Added Mighty Shake supplement  (200 kcal, 7 g protein)      5) Continue current micronutrient supplementation: multivitamin, electrolyte repletion prn     6) Consider discontinuation of calorie count, pt eating % of meals for past few days and today per son.     Monitoring and Evaluation:   Continue to monitor nutritional intake, tolerance to diet prescription, weights, labs, skin integrity      RD remains available upon request and will follow up per protocol  Kinjal Smiley RD, CDN, [facility]. Pager: [phone] Nutrition Follow Up Note  Patient seen for: nutrition consult when transferred to 6 Mon for "stage II pressure injury or >" and calorie count consult.    Chart reviewed, events noted. "66 year old Male with PMHx ESRD s/p permacath removal 5/10/22 s/p Rt DDRT 22,  CVA (), Afib on apixaban, seizure activity, CAD s/p stents, CABG (), HTN, HLD, DM on insulin, gastric/duodenal ulcer, recent hospitalization 22 -5/10/22 with weakness/anemia found to have perinephric hematoma requiring evacuation and repair of bleeding arterial anastomosis who presented to [facility] for status epilepticus requiring intubation for hypoxic respiratory failure."    Interim Events:  - transferred to [address] [place] [address] on   - endo consult , pt had episode of hypoglycemia (38mg/dL). Basal insulin d/c'd  - D5W started , d/c'd today, FS in 200s    Source: [] Patient       [x] EMR        [x] RN        [x] Family at bedside       [] Other: Per chart, A&O 2-3, lethargic, asleep at time of visit    Diet Order:   Diet, Regular:   Consistent Carbohydrate {Evening Snack} (CSTCHOSN)  Soft and Bite Sized (SOFTBTSZ)  Mildly Thick Liquids (MILDTHICKLIQS)  Supplement Feeding Modality:  Oral  Ensure Pudding Cans or Servings Per Day:  1       Frequency:  Three Times a day (06-15-22)    - Is current order appropriate/adequate? Pt's son inquires about advancing diet textures to regular foods, which is what pt was eating PTA. No swallow eval noted yet this admission    Diet History:  6/10-: NPO  : diet advanced, assisted with feeding    PO intake :  Good PO intake, >75% per son and RN. Son reports pt has been eating most of items on tray, if not all items, for a few days now. Pt requires feeding assistance and eats when assisted. Pt enjoys the Ensure puddings, also likes the diet mighty shakes. Son requests pt to receive Nepro shakes BID as he was at home PTA.     Nutrition-related concerns:  - Renal (per chart): "ESRD s/p DDRT (22) c/b DGF requiring HD (last session 22) & large perinephric hematoma s/p evacuation w/ revision of arterial anastomosis (22)"  - Post-op hyperkalemia improved; sodium bicarbonate ordered 1300mg tid ()  - Insulin regimen decreased to SSI before meals/bedtime  - Multivitamin ordered    GI:    Last BM .   Bowel Regimen? [X] Yes: Miralax, senna, Mag-Ox    Weights:   Daily Weight in k.1 ()- ?accuracy, Daily Weight in k.1 (), Weight in k.3 (), Weight in k.9 (), Weight in k.5 (), Weight in k.7 (06-10)  DOSIN.6 kg   IBW: 75.2 kg      Nutritionally Pertinent MEDICATIONS  (STANDING):  atorvastatin  dextrose 5%.  dextrose 50% Injectable  dextrose 50% Injectable  dextrose 50% Injectable  fluconAZOLE   Tablet  insulin lispro (ADMELOG) corrective regimen sliding scale  insulin lispro (ADMELOG) corrective regimen sliding scale  levothyroxine  magnesium oxide  multivitamin  NIFEdipine XL  pantoprazole    Tablet  polyethylene glycol 3350  predniSONE   Tablet  senna  sodium bicarbonate  valGANciclovir    Pertinent Labs:  @ 06:19: Na 136, BUN 36<H>, Cr 1.68<H>, <H>, K+ 4.1, Phos 2.5, Mg 1.7, Alk Phos 72, ALT/SGPT 9<L>, AST/SGOT 17, HbA1c --    A1C with Estimated Average Glucose Result: 6.6 % (22 @ 17:12)  A1C with Estimated Average Glucose Result: 7.3 % (22 @ 13:42)  A1C with Estimated Average Glucose Result: 7.2 % (22 @ 05:48)    Finger Sticks:  POCT Blood Glucose.: 137 mg/dL ( @ 12:56)  POCT Blood Glucose.: 205 mg/dL ( @ 08:36)  POCT Blood Glucose.: 122 mg/dL ( @ 21:19)  POCT Blood Glucose.: 101 mg/dL (06-18 @ 18:09)  POCT Blood Glucose.: 70 mg/dL ( @ 17:21)  POCT Blood Glucose.: 72 mg/dL ( @ 17:19)        Skin per nursing documentation: stage II sacrum (present on admission), b/l buttock DTI  Edema: 1+ generalized, 2+ R hand    Estimated Needs: based on dosing wt 61.7kg, with consideration for malnutrition, BMI 19.5 kg/m2  Energy: 5933-5197 calories (30-35 kcal/kg)  Protein: 74-92 grams (1.2-1.5 gm/kg)    Previous Nutrition Diagnosis: Severe Malnutrition; Increased nutrient needs  Nutrition Diagnosis is: [X] ongoing; addressed with therapeutic diet and oral supplements    New Nutrition Diagnosis: n/a    Nutrition Care Plan:  [X] In Progress  [] Achieved  [] Not applicable    Nutrition Interventions:     Education Provided:       [] Yes:  [X] No: not appropriate       Recommendations:         1) Consider swallowing evaluation to assess if pt can resume regular textured foods and thin liquids.      2) Continue Consistent Carbohydrate diet with snack; remove Puddings secondary to high carb/sugar content and pt with good PO intake. Will add diet puddings     3) Add Nepro BID please      4) Continuing Nectar Consistency No Sugar Added Mighty Shake supplement  (200 kcal, 7 g protein)      5) Continue current micronutrient supplementation: multivitamin, electrolyte repletion prn     6) Consider discontinuation of calorie count, pt eating % of meals for past few days and today per son.          - spoke to transplant team who states they wanted 24 hr orestes count to ensure pt is eating enough, as it was ambiguous this morning how well the pt was eating.     Monitoring and Evaluation:   Continue to monitor nutritional intake, tolerance to diet prescription, weights, labs, skin integrity      RD remains available upon request and will follow up per protocol  Kinjal Smiley RD, CDN, [facility]. Pager: [phone]

## 2022-06-19 NOTE — PROGRESS NOTE ADULT - ASSESSMENT
Mr. Castelan is a 66 yr old man with PMHx DM type II, HTN, CAD s/p CABG in 2020, Afib on Eliquis, CVA in 2019 due to Afib, Seizure d/o following CVA last episode was on 4/8/22, h/o GI bleed in 2/2022 EGD with duodenal ulcer,     ESRD s/p DDRT on 4/21/22 (course complicated by DGF, was on HD until 4/29/22, large perinephric hematoma s/p evacuation and repair of arterial anastomosis on 5/2/22) presented from rehab 6/10 with status epilepticus secondary to likely UTI, intubated for airway protection, s/p right thoracentesis complicated by hemothorax.  - 6/11 chest tube placed- 1liter drained   6/12-  intubated sedated CT scan completed  6/13  - Intubated   Chest tube 130/530 for OR with Dr Stover date Wednesday Lizett 15 at 1330  6/14   remains extubated,     vss   spoke to son  received consent for OR   in am  0730  rt chest tube  6/16    remains   extubated,     vss    2 rt chest tube lws  6/17 patient currently stable on room air. R chest tube #1 - WS +A/L, R chest tube #2 - WS no A/L.   6/18  pleurvac  #2  min drainage ,   vss    no a/l     6/19   dc last chest tube   tolerated well

## 2022-06-19 NOTE — CHART NOTE - NSCHARTNOTEFT_GEN_A_CORE
Chart reviewed. Fasting glucose above goal. Pre-lunch glucose at goal without pre-meal insulin however pre-dinner glucose elevated in mid 200s. Per son at bedside, pt eats well when family is there to assist with feeding; states patient has low upper extremity strength now and needs assistance with eating. Family not there at breakfast this morning so suspect patient likely did not eat much.     Recommendations  - advise to start Lantus 7 units qhs, admelog 3 units TID pre-meal (hold if not eating)  - discussed with team and RN the need for assistance with feeding  - continue low pre-meal and low bedtime correction scales     DW Team    Alisha Chambers DO, Endocrine Fellow   Pager [phone] from 9-5PM. After hours and on weekends please call [phone].

## 2022-06-19 NOTE — PROGRESS NOTE ADULT - NS ATTEND AMEND GEN_ALL_CORE FT
s/p DDRT with good function, complicated post-op course as above  Immunosuppression - Increase to prograf 1mg bid; Cont prednisone 5mg daily, cellcept 500mg bid  seizures- keppra and depakote as per neurology  hemothorax, acute blood loss anemia stable without active bleeding, cont chest tube to suctrion, removal as per thoracic surgery  hypoglycemia - lantus decreased, encourage PO intake, monitor levels

## 2022-06-19 NOTE — PROGRESS NOTE ADULT - PROBLEM SELECTOR PLAN 1
sp  rt vats drainage hemothorax  2 R chest tubes  now out  please remove vaseline guaze dressing on tuesday  then may shower   incisions  please have patient f/up with Dr Cardenas,  Cts   in 10 days   call [phone] for appt   thoracic to sign off      Care as per SICU team

## 2022-06-19 NOTE — PROGRESS NOTE ADULT - SUBJECTIVE AND OBJECTIVE BOX
[facility] DIVISION OF KIDNEY DISEASES AND HYPERTENSION -- FOLLOW UP NOTE  --------------------------------------------------------------------------------  Chief Complaint:  hemothorax    24 hour events/subjective:  PT weak, hypoglycemic yesterday.       PAST HISTORY  --------------------------------------------------------------------------------  No significant changes to PMH, PSH, FHx, SHx, unless otherwise noted    ALLERGIES & MEDICATIONS  --------------------------------------------------------------------------------  Allergies    No Known Allergies    Intolerances      Standing Inpatient Medications  atorvastatin 40 milliGRAM(s) Oral at bedtime  chlorhexidine 2% Cloths 1 Application(s) Topical <User Schedule>  dextrose 5%. 1000 milliLiter(s) IV Continuous <Continuous>  dextrose 50% Injectable 25 Gram(s) IV Push once  dextrose 50% Injectable 12.5 Gram(s) IV Push once  dextrose 50% Injectable 25 Gram(s) IV Push once  diVALproex DR 1000 milliGRAM(s) Oral two times a day  fluconAZOLE   Tablet 200 milliGRAM(s) Oral daily  insulin lispro (ADMELOG) corrective regimen sliding scale   SubCutaneous three times a day before meals  insulin lispro (ADMELOG) corrective regimen sliding scale   SubCutaneous at bedtime  levETIRAcetam 250 milliGRAM(s) Oral two times a day  levothyroxine 50 MICROGram(s) Oral daily  magnesium oxide 400 milliGRAM(s) Oral every 8 hours  multivitamin 1 Tablet(s) Oral daily  mycophenolate mofetil 500 milliGRAM(s) Oral <User Schedule>  NIFEdipine XL 30 milliGRAM(s) Oral daily  pantoprazole    Tablet 40 milliGRAM(s) Oral before breakfast  polyethylene glycol 3350 17 Gram(s) Oral daily  predniSONE   Tablet 5 milliGRAM(s) Oral daily  senna 2 Tablet(s) Oral at bedtime  sodium bicarbonate 1300 milliGRAM(s) Oral three times a day  tacrolimus 0.5 milliGRAM(s) Oral <User Schedule>  valGANciclovir 450 milliGRAM(s) Oral <User Schedule>    PRN Inpatient Medications  acetaminophen     Tablet .. 975 milliGRAM(s) Oral every 6 hours PRN  dextrose Oral Gel 15 Gram(s) Oral once PRN      REVIEW OF SYSTEMS  --------------------------------------------------------------------------------  Gen:  + weakness and fatigue.     All other systems were reviewed and are negative, except as noted.    VITALS/PHYSICAL EXAM  --------------------------------------------------------------------------------  T(C): 36.5 (06-19-22 @ 09:00), Max: 36.7 (06-18-22 @ 17:00)  HR: 79 (06-19-22 @ 09:00) (76 - 93)  BP: 173/79 (06-19-22 @ 09:00) (149/65 - 173/79)  RR: 18 (06-19-22 @ 09:00) (18 - 18)  SpO2: 94% (06-19-22 @ 09:00) (92% - 95%)  Wt(kg): --        06-18-22 @ 07:01  -  06-19-22 @ 07:00  --------------------------------------------------------  IN: 2040 mL / OUT: 660 mL / NET: 1380 mL    06-19-22 @ 07:01  -  06-19-22 @ 10:53  --------------------------------------------------------  IN: 390 mL / OUT: 450 mL / NET: -60 mL      Physical Exam:  	Gen: ill appearing.   	HEENT: PERRL, supple neck, clear oropharynx  	Pulm: Coarse breath sounds.   	CV: RRR, S1S2; no rub, chest tube in place.   	Back: No spinal or CVA tenderness; no sacral edema  	Abd: +BS, soft, nontender/nondistended                      Transplant: No tenderness, swelling  	: No suprapubic tenderness  	LE: Warm, FROM; no edema  	Neuro: No focal deficits      LABS/STUDIES  --------------------------------------------------------------------------------              8.0    5.60  >-----------<  172      [06-19-22 @ 07:35]              26.3     136  |  102  |  36  ----------------------------<  184      [06-19-22 @ 06:19]  4.1   |  26  |  1.68        Ca     8.2     [06-19-22 @ 06:19]      Mg     1.7     [06-19-22 @ 06:19]      Phos  2.5     [06-19-22 @ 06:19]    TPro  5.4  /  Alb  2.4  /  TBili  0.3  /  DBili  x   /  AST  17  /  ALT  9   /  AlkPhos  72  [06-19-22 @ 06:19]    PT/INR: PT 16.1 , INR 1.40       [06-19-22 @ 06:19]  PTT: 36.4       [06-19-22 @ 06:19]      Creatinine Trend:  SCr 1.68 [06-19 @ 06:19]  SCr 1.76 [06-18 @ 06:47]  SCr 1.87 [06-17 @ 06:00]  SCr 1.82 [06-16 @ 22:14]  SCr 1.70 [06-16 @ 11:24]    Tacrolimus (), Serum: 6.3 ng/mL (06-18 @ 06:49)  Tacrolimus (), Serum: 6.1 ng/mL (06-17 @ 05:58)  Tacrolimus (), Serum: 11.1 ng/mL (06-16 @ 05:56)  Tacrolimus (), Serum: 7.5 ng/mL (06-15 @ 05:39)            Urinalysis - [06-10-22 @ 22:28]      Color Light Yellow / Appearance Clear / SG 1.016 / pH 6.0      Gluc 100 mg/dL / Ketone Negative  / Bili Negative / Urobili Negative       Blood Small / Protein Trace / Leuk Est Negative / Nitrite Negative      RBC 25 / WBC 5 / Hyaline  / Gran  / Sq Epi  / Non Sq Epi 1 / Bacteria Negative      Iron 17, TIBC 171, %sat 10      [05-02-22 @ 13:03]  Ferritin 1505      [05-02-22 @ 13:05]  PTH -- (Ca 9.2)      [02-05-22 @ 10:13]   294  HbA1c 6.0      [02-21-20 @ 08:53]  Lipid: chol 118, TG 54, HDL 59, LDL --      [06-27-21 @ 09:44]

## 2022-06-19 NOTE — PROGRESS NOTE ADULT - ASSESSMENT
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 Creatinine trending down to 2-2.2mg/dL. H/o seizure d/o with last seizure episode on 4/8/22. Admitted for status epilepticus in the setting of sub therapeutic valproic acid levels. Had right thoracentesis done on 6/10 for effusion complicated by large hemothorax, hemorrhagic shock requiring PRBC x 5 units and vasopressors, chest tube placed. BP now stable on low dose phenylephrine. Now extubated.     1.  REnal transplant - S/p DDRT on 4/21/22 Cr. stable in 2 range. ANA in the setting of hemodynamic instability 2/2 ATN. Donor was 58 , KDPI 82%, DCD, single vessels and ureter, HLA mismatch 1, 2, 2. No DSA, cPRA 0%. 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 Readmitted in May for anemia in the setting of large perinephric hematoma s/p evacuation and repair of arterial anastomosis on 5/2/22. Intra operative biopsy showed no rejection. As outpatient SCr. in th low 2 range now stable at 1.7.    2.  Immunosuppression - C/w prograf 0.5mg bid. Obtain tacrolimus level 30min prior to AM dose. C/w prednisone 5mg daily. C/w half-dose cellcept. C/w Valcyte, and fluconazole 200mg daily (on fluconazole after repair of arterial anastomosis) for prophylaxis.     3.  DM2 - now hypoglycemic, not eating.  Cont D5 until BGT in 150's then will stop.  Endocrine consult appreciated.   4.  Seizures - on antiepileptics, stable.   5.  Hemothorax - s/p VATS.  One chest tube removed.

## 2022-06-19 NOTE — PROGRESS NOTE ADULT - SUBJECTIVE AND OBJECTIVE BOX
VITAL SIGNS      Vital Signs Last 24 Hrs  T(C): 36.6 (06-19-22 @ 13:00), Max: 36.7 (06-18-22 @ 17:00)  T(F): 97.8 (06-19-22 @ 13:00), Max: 98 (06-18-22 @ 17:00)  HR: 79 (06-19-22 @ 13:00) (76 - 93)  BP: 147/62 (06-19-22 @ 13:00) (147/62 - 173/79)  RR: 18 (06-19-22 @ 13:00) (18 - 18)  SpO2: 95% (06-19-22 @ 13:00) (92% - 95%)                   Daily     Daily       Bilirubin Total, Serum: 0.3 mg/dL (06-19 @ 06:19)    CAPILLARY BLOOD GLUCOSE      POCT Blood Glucose.: 137 mg/dL (19 Jun 2022 12:56)  POCT Blood Glucose.: 205 mg/dL (19 Jun 2022 08:36)  POCT Blood Glucose.: 122 mg/dL (18 Jun 2022 21:19)  POCT Blood Glucose.: 101 mg/dL (18 Jun 2022 18:09)  POCT Blood Glucose.: 70 mg/dL (18 Jun 2022 17:21)  POCT Blood Glucose.: 72 mg/dL (18 Jun 2022 17:19)          Drains:                R Pleural   out                     PHYSICAL EXAM    Neurology: alert and oriented x 3, moves all extremities with no defecits  CV :  RRR  Lungs:   CTA B/L  Abdomen: soft, nontender, nondistended, positive bowel sounds, last bowel movement   Extremities:       no edema  no calf tenderness

## 2022-06-19 NOTE — PROGRESS NOTE ADULT - ASSESSMENT
66 yr old man with h/o DM type II, HTN, CAD s/p CABG in 2020, Afib on Eliquis, CVA in 2019 due to Afib, Seizure d/o (last episode was on 4/8/22), h/o GI bleed in 2/2022 EGD with duodenal ulcer and ESRD on HD s/p DDRT from DCD donor on 4/21/22 complicated by DGF requiring HD until 4/29/22 now with downtrending creatinine who presented with status epilepticus in setting of UTI/antibiotics and sub-therapeutic valproic acid level     Seizure  - On Keppra and Valproic acid, adjusted by Neuro  - Keep Mag > 2    DCD DDRT (ureter stented) 4/21/22  - stable renal function   - Strict I/Os  - Regular diet  - Immunosuppression: Prograf 0.5mg BID, MMF 500mg bid, prednisone 5mg daily  - PPX: Valcyte (MWF), Fluconazole 200mg daily (on fluconazole after repair of arterial anastomosis).  Hold Bactrim for hyperkalemia.  - discuss ureteral stent removal this admission     Klebsiella UTI  - ID following, ertapenem course completed.   - Blood Cx  6/10 ngtd    DM  - hypoglycemia improved.   - Endocrine following.   - Discontinue D5W     HTN:  -cont Nifedipine 30mg po qd    Dispo  PT c/s

## 2022-06-19 NOTE — PROGRESS NOTE ADULT - SUBJECTIVE AND OBJECTIVE BOX
Transplant Surgery Progress Note   --------------------------------------------------------------  DCD DDRT     Date: 4/21/22    Present: Patient seen and examined with multidisciplinary team including Transplant Surgeon: Dr. Mann, Nephrologist: Dr. Waqar Lomeli, WALDO Gamino and unit RN during am rounds.  Disciplines not in attendance will be notified of the plan.     HPI: 66 yr old man with PMH: DM type II, HTN, CAD s/p CABG in 2020, Afib on Eliquis, CVA in 2019 due to Afib, Seizure d/o following CVA last episode was on 4/8/22, h/o GI bleed in 2/2022 EGD with duodenal ulcer.  ESRD due to DM was on HD since 2019.     s/p DCD DDRT on 4/21/22.  Donor was 58 , KDPI 82%, DCD, single vessels and ureter, HLA mismatch 1, 2, 2. No DSA, cPRA 0%. 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 Re-admitted in May for anemia in the setting of large perinephric hematoma s/p evacuation and repair of arterial anastomosis on 5/2/22. Intra operative biopsy showed no rejection, Creatinine ranging ~2mg/dL.    In Rehab: He was recently dx with klebsiella UTI rx with ertapenem - then switched to Levaquin day #6.    He also had parainfluenza pneumonia. Was at rehab progressing well.      Hospital course:  - 6/10: transferred from rehab facility for seizure status epilepticus. Intubated for respiratory protection and transferred to MICU.     EEG showed no seizure activity. Neuro consulted.   - 6/11: Extubated. Found to have R pleural effusion. s/p Thoracentesis 1.3L. Eliquis changed to heparin drip.    Post procedure drop in H&H. 5u PRBC, 2 u FFP.    - 6/11 evening Transferred to SICU for further care in setting of hypoxia, significant R pleural effusion, hgb 6 and hemodynamic instability  - 6/11 Intubated at 9pm and sedated on Precedex.  CT placed, 600ml blood drained.  1L total overnight, started pressors  - 6/11 Received Protamine for PTT >100 (was on Heparin drip started for AF), 2 u FFP and 5u PRBC total  - 6/13 s/p VATS 2.2L old blood removed; second chest tube placed     Interval Events:  - afebrile, stable VSS  -Chest tube #2 was removed yesterday by thoracic. Post removal CXR w/ tiny R pneumothorax, small loculated R effusion. CT #1 placed on suction per thoracic overnight.   - Hypoglycemic yest with FS ~38, was started on D5 @ 50cc/hr. Lantus and pre-meal lispro were discontinued. FS 70-180s overnight and is 205 this AM.  -Ertapenem was discontinued yesterday, repeat blood cultures were sent this AM.     Immunosuppression:   Induction:  Thymoglobulin                                        Maintenance immunosuppression: FK 0.5mgh bid , MMF 500mg BID, Pred 5  Ongoing monitoring for signs of rejection.    Potential Discharge date: pending clinical improvement  Education:  Medications  Plan of care:  See Below      MEDICATIONS  (STANDING):  atorvastatin 40 milliGRAM(s) Oral at bedtime  chlorhexidine 2% Cloths 1 Application(s) Topical <User Schedule>  dextrose 5%. 1000 milliLiter(s) (50 mL/Hr) IV Continuous <Continuous>  dextrose 50% Injectable 25 Gram(s) IV Push once  dextrose 50% Injectable 12.5 Gram(s) IV Push once  dextrose 50% Injectable 25 Gram(s) IV Push once  diVALproex DR 1000 milliGRAM(s) Oral two times a day  fluconAZOLE   Tablet 200 milliGRAM(s) Oral daily  insulin lispro (ADMELOG) corrective regimen sliding scale   SubCutaneous three times a day before meals  insulin lispro (ADMELOG) corrective regimen sliding scale   SubCutaneous at bedtime  levETIRAcetam 250 milliGRAM(s) Oral two times a day  levothyroxine 50 MICROGram(s) Oral daily  magnesium oxide 400 milliGRAM(s) Oral every 8 hours  multivitamin 1 Tablet(s) Oral daily  mycophenolate mofetil 500 milliGRAM(s) Oral <User Schedule>  NIFEdipine XL 30 milliGRAM(s) Oral daily  pantoprazole    Tablet 40 milliGRAM(s) Oral before breakfast  polyethylene glycol 3350 17 Gram(s) Oral daily  predniSONE   Tablet 5 milliGRAM(s) Oral daily  senna 2 Tablet(s) Oral at bedtime  sodium bicarbonate 1300 milliGRAM(s) Oral three times a day  tacrolimus 0.5 milliGRAM(s) Oral <User Schedule>  valGANciclovir 450 milliGRAM(s) Oral <User Schedule>    MEDICATIONS  (PRN):  acetaminophen     Tablet .. 975 milliGRAM(s) Oral every 6 hours PRN Mild Pain (1 - 3)  dextrose Oral Gel 15 Gram(s) Oral once PRN Blood Glucose LESS THAN 70 milliGRAM(s)/deciliter      PAST MEDICAL & SURGICAL HISTORY:  Hypertension      Diabetes      Dyslipidemia      CAD (Coronary Artery Disease)  with Stents in 06/2009 and 6/2019, s/p off-pump C3L on 8/13/20      Hypothyroidism      CVA (cerebral vascular accident)  12/13/19 with residual bilateral weakness      Anemia      PEG (percutaneous endoscopic gastrostomy) status  removed July 2020      Intubation of airway performed without difficulty  Dec 2019  CVA c/b status epilepticus requiring intubation and PEG in 12/2019-      ESRD on dialysis  M/W/F      Seizures  after CVA, last seizure was last week 4/07/22      History of insertion of stent into coronary artery bypass graft  2009 and 2019      S/P CABG x 3  off pump C3L on 8/13/20          Vital Signs Last 24 Hrs  T(C): 36.5 (19 Jun 2022 09:00), Max: 36.7 (18 Jun 2022 17:00)  T(F): 97.7 (19 Jun 2022 09:00), Max: 98 (18 Jun 2022 17:00)  HR: 79 (19 Jun 2022 09:00) (76 - 93)  BP: 173/79 (19 Jun 2022 09:00) (149/65 - 173/79)  BP(mean): --  RR: 18 (19 Jun 2022 09:00) (18 - 18)  SpO2: 94% (19 Jun 2022 09:00) (92% - 95%)    I&O's Summary    18 Jun 2022 07:01  -  19 Jun 2022 07:00  --------------------------------------------------------  IN: 2040 mL / OUT: 660 mL / NET: 1380 mL                              8.0    5.60  )-----------( 172      ( 19 Jun 2022 07:35 )             26.3     06-19    136  |  102  |  36<H>  ----------------------------<  184<H>  4.1   |  26  |  1.68<H>    Ca    8.2<L>      19 Jun 2022 06:19  Phos  2.5     06-19  Mg     1.7     06-19    TPro  5.4<L>  /  Alb  2.4<L>  /  TBili  0.3  /  DBili  x   /  AST  17  /  ALT  9<L>  /  AlkPhos  72  06-19    Tacrolimus (), Serum: 6.3 ng/mL (06-18 @ 06:49)      REVIEW OF SYSTEMS  --------------------------------------------------------------------------------  Gen: No weight changes, fatigue, fevers/chills, weakness  Skin: No rashes  Head/Eyes/Ears/Mouth: No headache; Normal hearing; Normal vision w/o blurriness; No sinus pain/discomfort, sore throat  Respiratory: No dyspnea, cough, wheezing, hemoptysis. Some pain at CT sites  CV: No chest pain, PND, orthopnea  GI: No abdominal pain, diarrhea, constipation, nausea, vomiting, melena, hematochezia  : No increased frequency, dysuria, hematuria, nocturia  MSK: No joint pain/swelling; no back pain; no edema  Neuro: No dizziness/lightheadedness, weakness, seizures, numbness, tingling  Heme: No easy bruising or bleeding  Endo: No heat/cold intolerance  Psych: No significant nervousness, anxiety, stress, depression  All other systems were reviewed and are negative, except as noted.    PHYSICAL EXAM:  Constitutional: Well developed / well nourished  Eyes: Anicteric, PERRLA  ENMT: nc/at  Neck: supple  Respiratory: CTA anterior. R CT x2 to WS  Cardiovascular: RRR  Gastrointestinal: Soft, non distended, NT, incision healed   Genitourinary: tucker  Extremities: SCD's in place and working bilaterally, no edema  Vascular: Palpable dp pulses bilaterally  Neurological: A&O x3  Skin: no rashes, ulcerations or lesions  Musculoskeletal: Moving all extremities  Psychiatric: Responsive   Transplant Surgery Progress Note   --------------------------------------------------------------  DCD DDRT     Date: 4/21/22    Present: Patient seen and examined with multidisciplinary team including Transplant Surgeon: Dr. Mann, Nephrologist: Dr. Waqar Lomeli, WALDO Gamino and unit RN during am rounds.  Disciplines not in attendance will be notified of the plan.     HPI: 66 yr old man with PMH: DM type II, HTN, CAD s/p CABG in 2020, Afib on Eliquis, CVA in 2019 due to Afib, Seizure d/o following CVA last episode was on 4/8/22, h/o GI bleed in 2/2022 EGD with duodenal ulcer.  ESRD due to DM was on HD since 2019.     s/p DCD DDRT on 4/21/22.  Donor was 58 , KDPI 82%, DCD, single vessels and ureter, HLA mismatch 1, 2, 2. No DSA, cPRA 0%. 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 Re-admitted in May for anemia in the setting of large perinephric hematoma s/p evacuation and repair of arterial anastomosis on 5/2/22. Intra operative biopsy showed no rejection, Creatinine ranging ~2mg/dL.    In Rehab: He was recently dx with klebsiella UTI rx with ertapenem - then switched to Levaquin day #6.    He also had parainfluenza pneumonia. Was at rehab progressing well.      Hospital course:  - 6/10: transferred from rehab facility for seizure status epilepticus. Intubated for respiratory protection and transferred to MICU.     EEG showed no seizure activity. Neuro consulted.   - 6/11: Extubated. Found to have R pleural effusion. s/p Thoracentesis 1.3L. Eliquis changed to heparin drip.    Post procedure drop in H&H. 5u PRBC, 2 u FFP.    - 6/11 evening Transferred to SICU for further care in setting of hypoxia, significant R pleural effusion, hgb 6 and hemodynamic instability  - 6/11 Intubated at 9pm and sedated on Precedex.  CT placed, 600ml blood drained.  1L total overnight, started pressors  - 6/11 Received Protamine for PTT >100 (was on Heparin drip started for AF), 2 u FFP and 5u PRBC total  - 6/13 s/p VATS 2.2L old blood removed; second chest tube placed     Interval Events:  - afebrile, stable VSS  -Chest tube #2 was removed yesterday by thoracic. Post removal CXR w/ tiny R pneumothorax, small loculated R effusion. CT #1 placed on suction per thoracic overnight.   - Hypoglycemic yest with FS ~38, was started on D5 @ 50cc/hr. Lantus and pre-meal lispro were discontinued. FS 70-180s overnight and is 205 this AM.  -Ertapenem was discontinued yesterday, repeat blood cultures were sent this AM.     Immunosuppression:   Induction:  Thymoglobulin                                        Maintenance immunosuppression: FK 0.5mgh bid , MMF 500mg BID, Pred 5  Ongoing monitoring for signs of rejection.    Potential Discharge date: pending clinical improvement  Education:  Medications  Plan of care:  See Below      MEDICATIONS  (STANDING):  atorvastatin 40 milliGRAM(s) Oral at bedtime  chlorhexidine 2% Cloths 1 Application(s) Topical <User Schedule>  dextrose 5%. 1000 milliLiter(s) (50 mL/Hr) IV Continuous <Continuous>  dextrose 50% Injectable 25 Gram(s) IV Push once  dextrose 50% Injectable 12.5 Gram(s) IV Push once  dextrose 50% Injectable 25 Gram(s) IV Push once  diVALproex DR 1000 milliGRAM(s) Oral two times a day  fluconAZOLE   Tablet 200 milliGRAM(s) Oral daily  insulin lispro (ADMELOG) corrective regimen sliding scale   SubCutaneous three times a day before meals  insulin lispro (ADMELOG) corrective regimen sliding scale   SubCutaneous at bedtime  levETIRAcetam 250 milliGRAM(s) Oral two times a day  levothyroxine 50 MICROGram(s) Oral daily  magnesium oxide 400 milliGRAM(s) Oral every 8 hours  multivitamin 1 Tablet(s) Oral daily  mycophenolate mofetil 500 milliGRAM(s) Oral <User Schedule>  NIFEdipine XL 30 milliGRAM(s) Oral daily  pantoprazole    Tablet 40 milliGRAM(s) Oral before breakfast  polyethylene glycol 3350 17 Gram(s) Oral daily  predniSONE   Tablet 5 milliGRAM(s) Oral daily  senna 2 Tablet(s) Oral at bedtime  sodium bicarbonate 1300 milliGRAM(s) Oral three times a day  tacrolimus 0.5 milliGRAM(s) Oral <User Schedule>  valGANciclovir 450 milliGRAM(s) Oral <User Schedule>    MEDICATIONS  (PRN):  acetaminophen     Tablet .. 975 milliGRAM(s) Oral every 6 hours PRN Mild Pain (1 - 3)  dextrose Oral Gel 15 Gram(s) Oral once PRN Blood Glucose LESS THAN 70 milliGRAM(s)/deciliter      PAST MEDICAL & SURGICAL HISTORY:  Hypertension      Diabetes      Dyslipidemia      CAD (Coronary Artery Disease)  with Stents in 06/2009 and 6/2019, s/p off-pump C3L on 8/13/20      Hypothyroidism      CVA (cerebral vascular accident)  12/13/19 with residual bilateral weakness      Anemia      PEG (percutaneous endoscopic gastrostomy) status  removed July 2020      Intubation of airway performed without difficulty  Dec 2019  CVA c/b status epilepticus requiring intubation and PEG in 12/2019-      ESRD on dialysis  M/W/F      Seizures  after CVA, last seizure was last week 4/07/22      History of insertion of stent into coronary artery bypass graft  2009 and 2019      S/P CABG x 3  off pump C3L on 8/13/20          Vital Signs Last 24 Hrs  T(C): 36.5 (19 Jun 2022 09:00), Max: 36.7 (18 Jun 2022 17:00)  T(F): 97.7 (19 Jun 2022 09:00), Max: 98 (18 Jun 2022 17:00)  HR: 79 (19 Jun 2022 09:00) (76 - 93)  BP: 173/79 (19 Jun 2022 09:00) (149/65 - 173/79)  BP(mean): --  RR: 18 (19 Jun 2022 09:00) (18 - 18)  SpO2: 94% (19 Jun 2022 09:00) (92% - 95%)    I&O's Summary    18 Jun 2022 07:01  -  19 Jun 2022 07:00  --------------------------------------------------------  IN: 2040 mL / OUT: 660 mL / NET: 1380 mL                              8.0    5.60  )-----------( 172      ( 19 Jun 2022 07:35 )             26.3     06-19    136  |  102  |  36<H>  ----------------------------<  184<H>  4.1   |  26  |  1.68<H>    Ca    8.2<L>      19 Jun 2022 06:19  Phos  2.5     06-19  Mg     1.7     06-19    TPro  5.4<L>  /  Alb  2.4<L>  /  TBili  0.3  /  DBili  x   /  AST  17  /  ALT  9<L>  /  AlkPhos  72  06-19    Tacrolimus (), Serum: 6.3 ng/mL (06-18 @ 06:49)      REVIEW OF SYSTEMS  --------------------------------------------------------------------------------  Gen: No weight changes, fatigue, fevers/chills, weakness  Skin: No rashes  Head/Eyes/Ears/Mouth: No headache; Normal hearing; Normal vision w/o blurriness; No sinus pain/discomfort, sore throat  Respiratory: No dyspnea, cough, wheezing, hemoptysis. Some pain at CT sites  CV: No chest pain, PND, orthopnea  GI: No abdominal pain, diarrhea, constipation, nausea, vomiting, melena, hematochezia  : No increased frequency, dysuria, hematuria, nocturia  MSK: No joint pain/swelling; no back pain; no edema  Neuro: No dizziness/lightheadedness, weakness, seizures, numbness, tingling  Heme: No easy bruising or bleeding  Endo: No heat/cold intolerance  Psych: No significant nervousness, anxiety, stress, depression  All other systems were reviewed and are negative, except as noted.    PHYSICAL EXAM:  Constitutional: Well developed / well nourished  Eyes: Anicteric, PERRLA  ENMT: nc/at  Neck: supple  Respiratory: CTA anterior. R CT x1 to suction.   Cardiovascular: RRR  Gastrointestinal: Soft, non distended, NT, incision healed   Genitourinary: tucker  Extremities: SCD's in place and working bilaterally, no edema  Vascular: Palpable dp pulses bilaterally  Neurological: A&O x3  Skin: no rashes, ulcerations or lesions  Musculoskeletal: Moving all extremities  Psychiatric: Responsive

## 2022-06-20 NOTE — PROGRESS NOTE ADULT - NUTRITIONAL ASSESSMENT
This patient has been assessed with a concern for Malnutrition and has been determined to have a diagnosis/diagnoses of Severe protein-calorie malnutrition.    This patient is being managed with:   Diet Regular-  Consistent Carbohydrate {Evening Snack} (CSTCHOSN)  Soft and Bite Sized (SOFTBTSZ)  Mildly Thick Liquids (MILDTHICKLIQS)  Nepro Cans or Servings Per Day:  1       Frequency:  Two Times a day  Entered: Jun 19 2022  3:59PM

## 2022-06-20 NOTE — PROGRESS NOTE ADULT - ATTENDING COMMENTS
Allograft function is stable  Neurology evaluation for concern of AMS   On Prograft 1 mg po bid, ( f/u level today) Cellcept 500 mg po daily and Prednisone 5 mg po daily

## 2022-06-20 NOTE — PROGRESS NOTE ADULT - ASSESSMENT
66 yr old M with Type 2 DM A1C 6.6 c/b ESRD s/p DDRT on 3/21, CVA, CAD s/p CABG, Afib on apixaban, seizure activity, HTN, HLD, h/o GI bleed in 2/2022 EGD with duodenal ulcer, discharged to [facility] rehab center after prior hospitalization, now re-admitted from [facility] for seizures c/f status epilepticus in setting of UTI, requiring intubation for hypoxic respiratory failure and admission to MICU, ccb pleural effusion c/b bleeding s/p VATS. Now extubated and transferred to medicine floor.   Endocrine consulted for T2DM mgmt with hyper and hypo glycemia    #T2DM with complications with hyper and hypoglycemia   - goal glucose 100-180  - Recommend to c/w Lantus 7 Units HS and increase Admelog to 5 Units TIDAC plus low scale before meals and bedtime  - check FS AC/HS + 3 AM   - low pre-meal and low bedtime correction scales   - encourage PO intake   - carb consistent diet  Discharge  - Plan TBD based on insulin requirements at [place]. Outpatient f/u with Endocrinologist Dr. Lincoln.     #HTN  goal BP<130/80  - manage per primary team    #HLD  - continue statin     #Hypothyroidism  - continue LT4 50 mcg daily  - monitor TFTs outpatient      Mendy Sierra DO

## 2022-06-20 NOTE — PROGRESS NOTE ADULT - SUBJECTIVE AND OBJECTIVE BOX
[facility] DIVISION OF KIDNEY DISEASES AND HYPERTENSION -- FOLLOW UP NOTE  --------------------------------------------------------------------------------  HPI:  66 yr old man with ESRD due to DM was on HD since 2019, s/p DCD DDRT on 4/21/22. Donor was 58 , KDPI 82%, DCD, single vessels and ureter, HLA mismatch 1, 2, 2. No DSA, cPRA 0%. 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 Readmitted in May for anemia in the setting of large perinephric hematoma s/p evacuation and repair of arterial anastomosis on 5/2/22. Intra operative biopsy showed no rejection, Creatinine ranging 2mg/dL. He was recently dx with klebsiella UTI rx with ertapenem - then switched to Levaquin day #6. He also had parainfluenza pneumonia. Was at rehab progressing well. Presented with status epilepticus. Intubated for respiratory protection. Right pleural effusion drained 1300ml. Transfused 1 unit PRBC. Pt. transferred to SICU and was transfused 4 additional units for a total of 6 units.     Pt. seen this AM. Pt. denies any acute complaints. Per RN Pt. called family member to state he had a seizure. Has been having waxing and waning confusion. SCr. continues to improved. UIOP adequate. Chest tubes removed since yesterday    PAST HISTORY  --------------------------------------------------------------------------------  No significant changes to PMH, PSH, FHx, SHx, unless otherwise noted    ALLERGIES & MEDICATIONS  --------------------------------------------------------------------------------  Allergies    No Known Allergies    Intolerances      Standing Inpatient Medications  atorvastatin 40 milliGRAM(s) Oral at bedtime  chlorhexidine 2% Cloths 1 Application(s) Topical <User Schedule>  dextrose 5%. 1000 milliLiter(s) IV Continuous <Continuous>  dextrose 50% Injectable 25 Gram(s) IV Push once  dextrose 50% Injectable 12.5 Gram(s) IV Push once  dextrose 50% Injectable 25 Gram(s) IV Push once  diVALproex DR 1000 milliGRAM(s) Oral two times a day  fluconAZOLE   Tablet 200 milliGRAM(s) Oral daily  insulin glargine Injectable (LANTUS) 7 Unit(s) SubCutaneous at bedtime  insulin lispro (ADMELOG) corrective regimen sliding scale   SubCutaneous three times a day before meals  insulin lispro (ADMELOG) corrective regimen sliding scale   SubCutaneous at bedtime  insulin lispro Injectable (ADMELOG) 3 Unit(s) SubCutaneous three times a day before meals  levETIRAcetam 250 milliGRAM(s) Oral two times a day  levothyroxine 50 MICROGram(s) Oral daily  magnesium oxide 400 milliGRAM(s) Oral every 8 hours  magnesium sulfate  IVPB 2 Gram(s) IV Intermittent once  multivitamin 1 Tablet(s) Oral daily  mycophenolate mofetil 500 milliGRAM(s) Oral <User Schedule>  NIFEdipine XL 30 milliGRAM(s) Oral daily  pantoprazole    Tablet 40 milliGRAM(s) Oral before breakfast  polyethylene glycol 3350 17 Gram(s) Oral daily  predniSONE   Tablet 5 milliGRAM(s) Oral daily  senna 2 Tablet(s) Oral at bedtime  sodium bicarbonate 1300 milliGRAM(s) Oral three times a day  tacrolimus 1 milliGRAM(s) Oral <User Schedule>  valGANciclovir 450 milliGRAM(s) Oral <User Schedule>    PRN Inpatient Medications  acetaminophen     Tablet .. 975 milliGRAM(s) Oral every 6 hours PRN  dextrose Oral Gel 15 Gram(s) Oral once PRN      REVIEW OF SYSTEMS  --------------------------------------------------------------------------------  Gen: No fevers/chills  Respiratory: No dyspnea, cough,   CV: No chest pain, PND, orthopnea  GI: No abdominal pain, diarrhea, constipation, nausea, vomiting  Transplant: No pain  : No increased frequency, dysuria, hematuria   MSK: No edema  Neuro: No dizziness/lightheadedness    All other systems were reviewed and are negative, except as noted.    VITALS/PHYSICAL EXAM  --------------------------------------------------------------------------------  T(C): 37.1 (06-20-22 @ 09:30), Max: 37.1 (06-20-22 @ 09:00)  HR: 85 (06-20-22 @ 09:30) (75 - 90)  BP: 146/80 (06-20-22 @ 09:30) (143/72 - 172/72)  RR: 18 (06-20-22 @ 09:30) (18 - 18)  SpO2: 98% (06-20-22 @ 09:30) (93% - 98%)  Wt(kg): --        06-19-22 @ 07:01  -  06-20-22 @ 07:00  --------------------------------------------------------  IN: 1930 mL / OUT: 1730 mL / NET: 200 mL    Physical Exam:  	Gen: Awake, lying flat on RA  	HEENT: PERRL, MMM   	Pulm: CTAB anteriorly  	CV: S1S2+  	Abd: +BS, soft, non-tender          Transplant: No tenderness, swelling  	: No suprapubic tenderness, Miranda+  	MSK: no LE edema, some bilateral UE edema  R>L- imrpoving  	Psych: normal affect   	Skin: Warm          Access: Peripheral     LABS/STUDIES  --------------------------------------------------------------------------------              8.4    4.93  >-----------<  213      [06-20-22 @ 06:26]              26.9     139  |  105  |  34  ----------------------------<  174      [06-20-22 @ 06:24]  4.4   |  22  |  1.54        Ca     8.3     [06-20-22 @ 06:24]      Mg     1.8     [06-20-22 @ 06:24]      Phos  2.5     [06-20-22 @ 06:24]    TPro  5.4  /  Alb  2.0  /  TBili  0.3  /  DBili  x   /  AST  19  /  ALT  10  /  AlkPhos  78  [06-20-22 @ 06:24]    PT/INR: PT 15.4 , INR 1.32       [06-20-22 @ 06:27]  PTT: 33.5       [06-20-22 @ 06:27]      Creatinine Trend:  SCr 1.54 [06-20 @ 06:24]  SCr 1.68 [06-19 @ 06:19]  SCr 1.76 [06-18 @ 06:47]  SCr 1.87 [06-17 @ 06:00]  SCr 1.82 [06-16 @ 22:14]    Tacrolimus (), Serum: 2.8 ng/mL (06-19 @ 06:19)  Tacrolimus (), Serum: 6.3 ng/mL (06-18 @ 06:49)  Tacrolimus (), Serum: 6.1 ng/mL (06-17 @ 05:58)  Tacrolimus (), Serum: 11.1 ng/mL (06-16 @ 05:56)      Urinalysis - [06-10-22 @ 22:28]      Color Light Yellow / Appearance Clear / SG 1.016 / pH 6.0      Gluc 100 mg/dL / Ketone Negative  / Bili Negative / Urobili Negative       Blood Small / Protein Trace / Leuk Est Negative / Nitrite Negative      RBC 25 / WBC 5 / Hyaline  / Gran  / Sq Epi  / Non Sq Epi 1 / Bacteria Negative      Iron 17, TIBC 171, %sat 10      [05-02-22 @ 13:03]  Ferritin 1505      [05-02-22 @ 13:05]  PTH -- (Ca 9.2)      [02-05-22 @ 10:13]   294  HbA1c 6.0      [02-21-20 @ 08:53]  Lipid: chol 118, TG 54, HDL 59, LDL --      [06-27-21 @ 09:44]

## 2022-06-20 NOTE — PROGRESS NOTE ADULT - ATTENDING COMMENTS
65yo man with PMH of CVA (2019) with subsequent seizure disorder, ESRD s/p renal transplant (04/2022), afib (on apixaban), CAD (s/p stents), CABG (2020), HTN, HLD, DMII who initially presented with status epilepticus from [facility]. He is on Keppra 250mg BId and VPA 1000mg BID.   Pt now on the floors, per medicine team pt having fluctuating mental status. Pt seen today at bedside. he is awake and alert, oriented to person, place, month and year, but not date. He appears confused, states that he is cold and very weak. On exam he has difficulty holding his arms in the air for prolonged periods, he also has 3+ patellar and 4+ ankle flexes bilat, plantars mute bilat. Last ammonia level was elevated at 50 on 6/11.     Pt with severe deconditioning , possibly delirium vs metabolic encephalopathy. Low concern for seizure as he has very clear clinical events.   - would recheck ammonia and VPA level  - MRI brain given hyperreflexia. He has none since admission.     Delirium precautions:    -Encourage mobility, receiving physical therapy, occupational therapy, and performing primary mobility in the first   -Reduce disturbing noises in the patient’s environment should be decreased too improve patient’s sleep and rest.  -Regular sleep/wake schedule  -Keep hydrated  - Check for and treat any urinary retention and constipation  -Familiar objects, pleasant fragrances, or soothing sounds to decrease stress.   -Frequent reorientation, make sure clock and calendar is within eyesight,  if possible, the patient’s bed should be placed close to the window so he/she can notice the change in the morning and night light, and if not lights on during the day and off at night as much as possible.  -Make sure widows are open to the light during the day.  -Glasses for patients with vision impairments and hearing aid in patients with hearing impairment

## 2022-06-20 NOTE — PROGRESS NOTE ADULT - SUBJECTIVE AND OBJECTIVE BOX
Chief Complaint: T2DM    History: No acute events. No hypoglycemia.     MEDICATIONS  (STANDING):  atorvastatin 40 milliGRAM(s) Oral at bedtime  chlorhexidine 2% Cloths 1 Application(s) Topical <User Schedule>  dextrose 5%. 1000 milliLiter(s) (50 mL/Hr) IV Continuous <Continuous>  dextrose 50% Injectable 25 Gram(s) IV Push once  dextrose 50% Injectable 12.5 Gram(s) IV Push once  dextrose 50% Injectable 25 Gram(s) IV Push once  diVALproex DR 1000 milliGRAM(s) Oral two times a day  fluconAZOLE   Tablet 200 milliGRAM(s) Oral daily  insulin glargine Injectable (LANTUS) 7 Unit(s) SubCutaneous at bedtime  insulin lispro (ADMELOG) corrective regimen sliding scale   SubCutaneous three times a day before meals  insulin lispro (ADMELOG) corrective regimen sliding scale   SubCutaneous at bedtime  insulin lispro Injectable (ADMELOG) 3 Unit(s) SubCutaneous three times a day before meals  levETIRAcetam 250 milliGRAM(s) Oral two times a day  levothyroxine 50 MICROGram(s) Oral daily  magnesium oxide 400 milliGRAM(s) Oral every 8 hours  multivitamin 1 Tablet(s) Oral daily  mycophenolate mofetil 500 milliGRAM(s) Oral <User Schedule>  NIFEdipine XL 30 milliGRAM(s) Oral daily  pantoprazole    Tablet 40 milliGRAM(s) Oral before breakfast  polyethylene glycol 3350 17 Gram(s) Oral daily  predniSONE   Tablet 5 milliGRAM(s) Oral daily  senna 2 Tablet(s) Oral at bedtime  sodium bicarbonate 1300 milliGRAM(s) Oral three times a day  tacrolimus 1 milliGRAM(s) Oral <User Schedule>  valGANciclovir 450 milliGRAM(s) Oral <User Schedule>    MEDICATIONS  (PRN):  acetaminophen     Tablet .. 975 milliGRAM(s) Oral every 6 hours PRN Mild Pain (1 - 3)  dextrose Oral Gel 15 Gram(s) Oral once PRN Blood Glucose LESS THAN 70 milliGRAM(s)/deciliter      Allergies    No Known Allergies    Intolerances      Review of Systems:  Constitutional: No fever  Eyes: No blurry vision  Neuro: No tremors  HEENT: No pain  Cardiovascular: No chest pain, palpitations  Respiratory: No SOB, no cough  GI: No nausea, vomiting, abdominal pain  : No dysuria  Skin: no rash  Psych: no depression  Endocrine: no polyuria, polydipsia      ALL OTHER SYSTEMS REVIEWED AND NEGATIVE        PHYSICAL EXAM:  VITALS: T(C): 36.7 (06-20-22 @ 13:00)  T(F): 98 (06-20-22 @ 13:00), Max: 98.8 (06-20-22 @ 09:00)  HR: 76 (06-20-22 @ 13:00) (75 - 90)  BP: 139/68 (06-20-22 @ 13:00) (139/68 - 172/72)  RR:  (18 - 18)  SpO2:  (93% - 98%)  Wt(kg): --  GENERAL: NAD, well-groomed, well-developed  EYES: No proptosis, no lid lag, anicteric  HEENT:  Atraumatic, Normocephalic, moist mucous membranes  RESPIRATORY: Clear to auscultation bilaterally  CARDIOVASCULAR: Regular rate and rhythm  GI: Soft, nontender, non distended, normal bowel sounds      POCT Blood Glucose.: 278 mg/dL (06-20-22 @ 13:13)  POCT Blood Glucose.: 167 mg/dL (06-20-22 @ 08:33)  POCT Blood Glucose.: 207 mg/dL (06-19-22 @ 21:34)  POCT Blood Glucose.: 246 mg/dL (06-19-22 @ 17:16)  POCT Blood Glucose.: 137 mg/dL (06-19-22 @ 12:56)  POCT Blood Glucose.: 205 mg/dL (06-19-22 @ 08:36)  POCT Blood Glucose.: 122 mg/dL (06-18-22 @ 21:19)  POCT Blood Glucose.: 101 mg/dL (06-18-22 @ 18:09)  POCT Blood Glucose.: 70 mg/dL (06-18-22 @ 17:21)  POCT Blood Glucose.: 72 mg/dL (06-18-22 @ 17:19)  POCT Blood Glucose.: 136 mg/dL (06-18-22 @ 12:01)  POCT Blood Glucose.: 142 mg/dL (06-18-22 @ 09:22)  POCT Blood Glucose.: 38 mg/dL (06-18-22 @ 09:04)  POCT Blood Glucose.: 165 mg/dL (06-18-22 @ 02:07)  POCT Blood Glucose.: 111 mg/dL (06-17-22 @ 23:23)  POCT Blood Glucose.: 94 mg/dL (06-17-22 @ 22:52)  POCT Blood Glucose.: 71 mg/dL (06-17-22 @ 22:13)  POCT Blood Glucose.: 62 mg/dL (06-17-22 @ 21:39)  POCT Blood Glucose.: 79 mg/dL (06-17-22 @ 17:24)      06-20    139  |  105  |  34<H>  ----------------------------<  174<H>  4.4   |  22  |  1.54<H>    eGFR: 49<L>    Ca    8.3<L>      06-20  Mg     1.8     06-20  Phos  2.5     06-20    TPro  5.4<L>  /  Alb  2.0<L>  /  TBili  0.3  /  DBili  x   /  AST  19  /  ALT  10  /  AlkPhos  78  06-20

## 2022-06-20 NOTE — PROGRESS NOTE ADULT - ASSESSMENT
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 Creatinine trending down to 2-2.2mg/dL. H/o seizure d/o with last seizure episode on 4/8/22. Admitted for status epilepticus in the setting of sub therapeutic valproic acid levels. Had right thoracentesis done on 6/10 for effusion complicated by large hemothorax, hemorrhagic shock requiring PRBC x 5 units and vasopressors, chest tube placed. BP now stable on low dose phenylephrine. Now extubated and transferred to the floors.

## 2022-06-20 NOTE — ADVANCED PRACTICE NURSE CONSULT - RECOMMEDATIONS
Will recommend the followin. sacrum, b/l buttocks: continue to monitor, continue with foam dressing , change every 3 days and prn for soiling/drainage  2. Continue with T&P  3. complete cair boots  4. Seat cushion when OOB to chair  5. Nutrition support as pt condition allows  Tx plan discussed with RN    
Will recommend the followin. sacrum, b/l buttocks: continue to monitor, continue with foam dressing , change every 3 days and prn for soiling/drainage  2. Continue with T&P  3. complete cair boots  4. Seat cushion when OOB to chair  5. Nutrition support as pt condition allows  Tx plan discussed with RN

## 2022-06-20 NOTE — CONSULT NOTE ADULT - SUBJECTIVE AND OBJECTIVE BOX
[facility]   MD NINA MASON MD, WALDO PALMA NP    TEL:  OFFICE: [phone]    From 5pm-7am answering service [phone]    --- INITIAL RENAL CONSULT NOTE ---date of service 22 @ 15:13    HPI:  66 yr old Male with PMHx ESRD s/p permacath removal 5/10/22 s/p Rt DDRT 22,  CVA (), Afib on apixaban, seizure activity, CAD s/p stents, CABG (), HTN, HLD, DM on insulin, gastric/duodenal ulcer, recent hospitalization 22 -5/10/22 with weakness/anemia found to have perinephric hematoma requiring evacuation and repair of bleeding arterial anastomosis. Curently being treated for UTI with Levaquin Today after developing multiple sz episodes refractory to 5 mg versed IM (EMS) and 2 mg ativan IV in E.D. concerning for status epilepticus requiring intubation for hypoxic respiratory  failure. MICU Consult was called for hypoxic respiratory failure secondary to status epilepticus.  Nephrology consulted for renal failure.       Allergies:  No Known Allergies      PAST MEDICAL & SURGICAL HISTORY:  Hypertension      Diabetes      Dyslipidemia      CAD (Coronary Artery Disease)  with Stents in 2009 and 2019, s/p off-pump C3L on 20      Hypothyroidism      CVA (cerebral vascular accident)  19 with residual bilateral weakness      Anemia      PEG (percutaneous endoscopic gastrostomy) status  removed 2020      Intubation of airway performed without difficulty  Dec 2019  CVA c/b status epilepticus requiring intubation and PEG in 2019-      ESRD on dialysis  M/W/F      Seizures  after CVA, last seizure was last week 22      History of insertion of stent into coronary artery bypass graft   and       S/P CABG x 3  off pump C3L on 20          Home Medications Reviewed    Hospital Medications:   MEDICATIONS  (STANDING):  atorvastatin 40 milliGRAM(s) Oral at bedtime  chlorhexidine 2% Cloths 1 Application(s) Topical <User Schedule>  dextrose 5%. 1000 milliLiter(s) (50 mL/Hr) IV Continuous <Continuous>  dextrose 50% Injectable 25 Gram(s) IV Push once  dextrose 50% Injectable 12.5 Gram(s) IV Push once  dextrose 50% Injectable 25 Gram(s) IV Push once  diVALproex DR 1000 milliGRAM(s) Oral two times a day  fluconAZOLE   Tablet 200 milliGRAM(s) Oral daily  insulin glargine Injectable (LANTUS) 7 Unit(s) SubCutaneous at bedtime  insulin lispro (ADMELOG) corrective regimen sliding scale   SubCutaneous three times a day before meals  insulin lispro (ADMELOG) corrective regimen sliding scale   SubCutaneous at bedtime  insulin lispro Injectable (ADMELOG) 3 Unit(s) SubCutaneous three times a day before meals  levETIRAcetam 250 milliGRAM(s) Oral two times a day  levothyroxine 50 MICROGram(s) Oral daily  magnesium oxide 400 milliGRAM(s) Oral every 8 hours  multivitamin 1 Tablet(s) Oral daily  mycophenolate mofetil 500 milliGRAM(s) Oral <User Schedule>  NIFEdipine XL 30 milliGRAM(s) Oral daily  pantoprazole    Tablet 40 milliGRAM(s) Oral before breakfast  polyethylene glycol 3350 17 Gram(s) Oral daily  predniSONE   Tablet 5 milliGRAM(s) Oral daily  senna 2 Tablet(s) Oral at bedtime  sodium bicarbonate 1300 milliGRAM(s) Oral three times a day  tacrolimus 1 milliGRAM(s) Oral <User Schedule>  valGANciclovir 450 milliGRAM(s) Oral <User Schedule>      SOCIAL HISTORY:  Denies ETOh, Smoking,     FAMILY HISTORY:  Family history of cancer of tongue (Father)  Parents are  . Father - at 80 years, tongue cancer was a smoker. Mother- at 71, had accident while crossing road. Siblings- 2 brothers and one sister.        REVIEW OF SYSTEMS:  CONSTITUTIONAL: No weakness, fevers or chills  EYES/ENT: No visual changes;  No vertigo or throat pain   NECK: No pain or stiffness  RESPIRATORY: No cough, wheezing, hemoptysis; No shortness of breath  CARDIOVASCULAR: No chest pain or palpitations.  GASTROINTESTINAL: No abdominal or epigastric pain. No nausea, vomiting, or hematemesis; No diarrhea or constipation. No melena or hematochezia.  GENITOURINARY: No dysuria, frequency, foamy urine, urinary urgency, incontinence or hematuria  NEUROLOGICAL: No numbness or weakness  SKIN: No itching, burning, rashes, or lesions   VASCULAR: No bilateral lower extremity edema.   All other review of systems is negative unless indicated above.    VITALS:  T(F): 98 (22 @ 13:00), Max: 98.8 (22 @ 09:00)  HR: 76 (22 @ 13:00)  BP: 139/68 (22 @ 13:00)  RR: 18 (22 @ 13:00)  SpO2: 95% (22 @ 13:00)  Wt(kg): --     @ 07:01  -   @ 07:00  --------------------------------------------------------  IN: 1930 mL / OUT: 1730 mL / NET: 200 mL     @ 07:01  -   @ 15:13  --------------------------------------------------------  IN: 820 mL / OUT: 850 mL / NET: -30 mL          PHYSICAL EXAM:  Constitutional: NAD  HEENT: anicteric sclera, oropharynx clear, MMM  Neck: No JVD  Respiratory: CTAB, no wheezes, rales or rhonchi  Cardiovascular: S1, S2, RRR  Gastrointestinal: BS+, soft, NT/ND  Extremities: No cyanosis or clubbing. No peripheral edema  Neurological: A/O x 3, no focal deficits  Psychiatric: Normal mood, normal affect  : No CVA tenderness. No tucker.   Skin: No rashes  Vascular Access:    LABS:      139  |  105  |  34<H>  ----------------------------<  174<H>  4.4   |  22  |  1.54<H>    Ca    8.3<L>      2022 06:24  Phos  2.5       Mg     1.8         TPro  5.4<L>  /  Alb  2.0<L>  /  TBili  0.3  /  DBili      /  AST  19  /  ALT  10  /  AlkPhos  78  06-20    Creatinine Trend: 1.54 <--, 1.68 <--, 1.76 <--, 1.87 <--, 1.82 <--, 1.70 <--, 1.70 <--, 1.75 <--, 1.68 <--, 1.66 <--, 1.83 <--, 1.98 <--, 2.04 <--, 2.09 <--                        8.4    4.93  )-----------( 213      ( 2022 06:26 )             26.9     Urine Studies:        RADIOLOGY & ADDITIONAL STUDIES:

## 2022-06-20 NOTE — ADVANCED PRACTICE NURSE CONSULT - ASSESSMENT
The pt was encountered in the 8ICU- Mr Castelan was intubated and unresponsive to verbal stimuli. He is on a [employer] support surface and is being assisted with T&P. will recommend complete cair boots to off-load the heels.  The pt was seen by nutrition and diagnosed with severe acute malnutrition- he is NPO for possible surgery,  UPon assessment, he presents with skin changes to the b/l buttocks and sacrum measuring 8cmx 6cm x0cm- 90% of this area is intact darkened skin and 10% is open and pink; will classify as a deep tissue injury present on admission. staff have applied a foam dressing -will recommend to continue with same. 
The pt was encountered on [facility]- Mr Castelan is on a [employer] P 500 support surface and is able to move about in the  bed- today he was restless. will recommend Complete CAir boots to off-load his heels.  The pt is being followed by nutrition for a malnutrition diagnosis. He appears thin and frail.  Upon assessment, the staff had applied a condom cathter to divert urine from the skin. UPon assessment, the skin changes to the b/l buttocks and sacrum are unchanged since last assessment on 6/13. the skin on the sacrum and b/ buttocks is darkened with pinpoint open area on the sacrum. the skin is 90% intact  with a 10% open area. the area in total measures 8cmx 6cm x0cm.   Staff had applied a foam dressing - will suggest to continue with same as the sacral bone was protuberant.

## 2022-06-20 NOTE — CHART NOTE - NSCHARTNOTEFT_GEN_A_CORE
Nutrition note     Pt seen for kcal count x 24 hours results. Last malnutrition follow up completed yesterday (06/19) - see for details.     Pt confused/disoriented as per chart - RN reports pt with good appetite and PO intake, drinking Nepro. Noted 100% PO intake and empty cup of Nepro as per breakfast tray at bedside. RN reports pt tolerating current diet texture. Denies pt with nausea, vomiting, diarrhea, or constipation, last BM (06/18).     Kcal count completed for (06/19) - demonstrates pt consuming 100% of all meals. Discussed conversation with RN and kcal count results with transplant team PA.    Will continue to monitor PO intake, weight, labs, skin, GI status, and diet as able.     RD remains available upon request.   Carina Fuller MS RDN CDN [facility] CCTD #[phone] Nutrition note     Pt seen for kcal count x 24 hours results. Last malnutrition follow up completed yesterday (06/19) - see for details.     Pt confused/disoriented as per chart - RN reports pt with good appetite and PO intake, drinking Nepro. Noted 100% PO intake and empty cup of Nepro as per breakfast tray at bedside. RN reports pt tolerating current diet texture. Denies pt with nausea, vomiting, diarrhea, or constipation, last BM (06/18).     Kcal count completed for (06/19) - demonstrates pt consuming 100% of all meals. Discussed conversation with RN and kcal count results with transplant team PA. Interventions in place.     Will continue to monitor PO intake, weight, labs, skin, GI status, and diet as able.     RD remains available upon request.   Carina Fuller MS RDN CDN [facility] [place] CCTD #[phone]

## 2022-06-20 NOTE — PROGRESS NOTE ADULT - PROBLEM SELECTOR PLAN 2
S/p Thymo induction. Increase prograf to 1 mg bid. Obtain tacrolimus level 30min prior to AM dose. C/w prednisone 5mg daily. C/w half-dose cellcept. C/w Valcyte, and fluconazole 200mg daily (on fluconazole after repair of arterial anastomosis) for prophylaxis. D/c bactrim for hyperkalemia, after glucose control will resume. Consider resuming 6/21.      If you have any questions, please feel free to contact me  Ant Pantoja  Nephrology Fellow  [phone]; Prefer [employer] TEAMS  (After 5pm or on weekends please page the on-call fellow).

## 2022-06-20 NOTE — CONSULT NOTE ADULT - ASSESSMENT
66 yr old Male with PMHx ESRD s/p permacath removal 5/10/22 s/p Rt DDRT 4/21/22,  CVA (2019), Afib on apixaban, seizure activity, CAD s/p stents, CABG (2020), HTN, HLD, DM on insulin, gastric/duodenal ulcer, recent hospitalization 4/28/22 -5/10/22 with weakness/anemia found to have perinephric hematoma requiring evacuation and repair of bleeding arterial anastomosis. Curently being treated for UTI with Levaquin Today after developing multiple sz episodes refractory to 5 mg versed IM (EMS) and 2 mg ativan IV in E.D. concerning for status epilepticus requiring intubation for hypoxic respiratory  failure. MICU Consult was called for hypoxic respiratory failure secondary to status epilepticus.  Nephrology consulted for renal failure.       A/P    DDRT on 04/21/22  ANA likely ATN 2/2 hemodynamic change   scr improving   Immunosuppress as per transplant team.   monitor tacrolimus level   monitor BMP, U/O     HTN   optimal   monitor BP closely     Acidosis without anion gap   improved   on oral bicarb   monitor      66 yr old Male with PMHx ESRD s/p permacath removal 5/10/22 s/p Rt DDRT 4/21/22,  CVA (2019), Afib on apixaban, seizure activity, CAD s/p stents, CABG (2020), HTN, HLD, DM on insulin, gastric/duodenal ulcer, recent hospitalization 4/28/22 -5/10/22 with weakness/anemia found to have perinephric hematoma requiring evacuation and repair of bleeding arterial anastomosis. Curently being treated for UTI with Levaquin Today after developing multiple sz episodes refractory to 5 mg versed IM (EMS) and 2 mg ativan IV in E.D. concerning for status epilepticus requiring intubation for hypoxic respiratory  failure. MICU Consult was called for hypoxic respiratory failure secondary to status epilepticus.  Nephrology consulted for renal failure.       A/P    DDRT on 04/21/22  Course complicated by DGF, was on HD until 4/29/22. Readmitted in May for anemia in the setting of large perinephric hematoma s/p evacuation and repair of arterial anastomosis on 5/2/22. Intra operative biopsy showed no rejection.  ANA likely sec to  hemodynamic change   Renal function improving   monitor BMP, U/O     HTN   optimal   monitor BP closely     Immunosuppresssion  continue per transplant team

## 2022-06-20 NOTE — PROGRESS NOTE ADULT - ASSESSMENT
66 yr old man with h/o DM type II, HTN, CAD s/p CABG in 2020, Afib on Eliquis, CVA in 2019 due to Afib, Seizure d/o (last episode was on 4/8/22), h/o GI bleed in 2/2022 EGD with duodenal ulcer and ESRD on HD s/p R DDRT from DCD donor on 4/21/22 complicated by DGF requiring HD until 4/29/22 now with good graft function who presented with status epilepticus in setting of UTI/antibiotics and sub-therapeutic valproic acid level     Seizure  - no concern for active seizures at this time.  periods of confusion.  Will check MRI Head/Valproic levels per Neuro  - On Keppra and Valproic acid, adjusted by Neuro  - Keep Mag > 2    R DCD DDRT (ureter stented) 4/21/22  - stable renal function   - Strict I/Os  - Regular diet  - will attempt to remove stent this admission    Immunosuppression:   -FK per level (on standard Tacrolimus), MMF 500mg bid, Pred 5  -PPX: Valcyte (MWF), Fluconazole 200mg daily (on fluconazole after repair of arterial anastomosis).  Hold Bactrim for hyperkalemia.    R Pleural effusion  -resolved, chest tubes removed  -check CXR    Klebsiella UTI  - ID following, ertapenem course completed.   - Blood Cx  6/10 ngtd    DM  - on Lantus/Lispro, Endocrine following     HTN:  -cont Nifedipine    Dispo  -PT following

## 2022-06-20 NOTE — PROGRESS NOTE ADULT - SUBJECTIVE AND OBJECTIVE BOX
Transplant Surgery Multidisciplinary Progress Note   --------------------------------------------------------------  R DCD DDRT    4/21/22    Present: Patient seen and examined with multidisciplinary team including Transplant Surgeon: Dr. Tena,  Nephrologist: Dr. Mary Lomeli, Pharmacist: WALDO Lim and unit RN during am rounds.  Disciplines not in attendance will be notified of the plan.     HPI: 66M with PMH: DM type II, HTN, CAD s/p CABG in 2020, AFib on Eliquis, CVA in 2019 due to Afib, Seizure d/o following CVA, last episode was on 4/8/22, h/o GIB in 2/2022 EGD with duodenal ulcer.  ESRD due to DM was on HD since 2019.     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  Donor was 58 , KDPI 82%, DCD, single vessels and ureter, HLA mismatch 1, 2, 2. No DSA, cPRA 0%. 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 Re-admitted in May for anemia in the setting of large perinephric hematoma s/p evacuation and repair of arterial anastomosis on 5/2/22. Intra operative biopsy showed no rejection, Creatinine ranging ~2mg/dL.    In Rehab: He was recently dx with klebsiella UTI rx with ertapenem - then switched to Levaquin day #6.    He also had parainfluenza pneumonia. Was at rehab progressing well.      Hospital course:  - 6/10: transferred from rehab facility for seizure status epilepticus. Intubated for respiratory protection and transferred to MICU.  EEG showed no seizure activity. Neuro consulted.   - 6/11: Extubated. Found to have R pleural effusion. s/p Thoracentesis 1.3L. Eliquis changed to heparin drip.  Post procedure drop in H&H. 5u PRBC, 2 u FFP.    - 6/11 evening: Transferred to SICU for further care in setting of hypoxia, significant R pleural effusion, hgb 6 and hemodynamic instability  - 6/11 Intubated at 9pm and sedated on Precedex.  CT placed, 600ml blood drained.  1L total overnight, started pressors  - 6/11 Received Protamine for PTT >100 (was on Heparin drip started for AF), 2 u FFP and 5u PRBC total  - 6/13 s/p VATS 2.2L old blood removed; second chest tube placed  - 6/18-19: chest tubes removed    Interval Events:  - Afebrile, VSS  - periods of disorientation/?hospital delirium.   - no agitation, easily reiorientable. participates with nursing, tolerating full tray without issues  - overall with significant weakness  - graft with good function    Immunosuppression:   Induction:  Thymoglobulin                                        Maintenance immunosuppression: FK per level , MMF 500mg BID, Pred 5  Ongoing monitoring for signs of rejection.    Potential Discharge date: pending clinical improvement  Education:  Medications  Plan of care:  See Below      MEDICATIONS  (STANDING):  atorvastatin 40 milliGRAM(s) Oral at bedtime  chlorhexidine 2% Cloths 1 Application(s) Topical <User Schedule>  dextrose 5%. 1000 milliLiter(s) (50 mL/Hr) IV Continuous <Continuous>  dextrose 50% Injectable 25 Gram(s) IV Push once  dextrose 50% Injectable 12.5 Gram(s) IV Push once  dextrose 50% Injectable 25 Gram(s) IV Push once  diVALproex DR 1000 milliGRAM(s) Oral two times a day  fluconAZOLE   Tablet 200 milliGRAM(s) Oral daily  insulin glargine Injectable (LANTUS) 7 Unit(s) SubCutaneous at bedtime  insulin lispro (ADMELOG) corrective regimen sliding scale   SubCutaneous three times a day before meals  insulin lispro (ADMELOG) corrective regimen sliding scale   SubCutaneous at bedtime  insulin lispro Injectable (ADMELOG) 3 Unit(s) SubCutaneous three times a day before meals  levETIRAcetam 250 milliGRAM(s) Oral two times a day  levothyroxine 50 MICROGram(s) Oral daily  magnesium oxide 400 milliGRAM(s) Oral every 8 hours  multivitamin 1 Tablet(s) Oral daily  mycophenolate mofetil 500 milliGRAM(s) Oral <User Schedule>  NIFEdipine XL 30 milliGRAM(s) Oral daily  pantoprazole    Tablet 40 milliGRAM(s) Oral before breakfast  polyethylene glycol 3350 17 Gram(s) Oral daily  predniSONE   Tablet 5 milliGRAM(s) Oral daily  senna 2 Tablet(s) Oral at bedtime  sodium bicarbonate 1300 milliGRAM(s) Oral three times a day  tacrolimus 1 milliGRAM(s) Oral <User Schedule>  valGANciclovir 450 milliGRAM(s) Oral <User Schedule>    MEDICATIONS  (PRN):  acetaminophen     Tablet .. 975 milliGRAM(s) Oral every 6 hours PRN Mild Pain (1 - 3)  dextrose Oral Gel 15 Gram(s) Oral once PRN Blood Glucose LESS THAN 70 milliGRAM(s)/deciliter            Vital Signs Last 24 Hrs  T(C): 36.7 (20 Jun 2022 13:00), Max: 37.1 (20 Jun 2022 09:00)  T(F): 98 (20 Jun 2022 13:00), Max: 98.8 (20 Jun 2022 09:00)  HR: 76 (20 Jun 2022 13:00) (75 - 90)  BP: 139/68 (20 Jun 2022 13:00) (139/68 - 172/72)  BP(mean): --  RR: 18 (20 Jun 2022 13:00) (18 - 18)  SpO2: 95% (20 Jun 2022 13:00) (93% - 98%)    I&O's Summary    19 Jun 2022 07:01  -  20 Jun 2022 07:00  --------------------------------------------------------  IN: 1930 mL / OUT: 1730 mL / NET: 200 mL    20 Jun 2022 07:01  -  20 Jun 2022 15:54  --------------------------------------------------------  IN: 820 mL / OUT: 850 mL / NET: -30 mL                              8.4    4.93  )-----------( 213      ( 20 Jun 2022 06:26 )             26.9     06-20    139  |  105  |  34<H>  ----------------------------<  174<H>  4.4   |  22  |  1.54<H>    Ca    8.3<L>      20 Jun 2022 06:24  Phos  2.5     06-20  Mg     1.8     06-20    TPro  5.4<L>  /  Alb  2.0<L>  /  TBili  0.3  /  DBili  x   /  AST  19  /  ALT  10  /  AlkPhos  78  06-20    Tacrolimus (), Serum: 5.2 ng/mL (06-20 @ 06:26)        Culture - Blood (collected 06-19-22 @ 06:19)  Source: .Blood Blood  Preliminary Report (06-20-22 @ 09:00):    No growth to date.                  REVIEW OF SYSTEMS  --------------------------------------------------------------------------------  Gen: No weight changes, fatigue, fevers/chills, weakness  Skin: No rashes  Head/Eyes/Ears/Mouth: No headache; Normal hearing; Normal vision w/o blurriness; No sinus pain/discomfort, sore throat  Respiratory: No dyspnea, cough, wheezing, hemoptysis. Some pain at CT sites  CV: No chest pain, PND, orthopnea  GI: No abdominal pain, diarrhea, constipation, nausea, vomiting, melena, hematochezia  : No increased frequency, dysuria, hematuria, nocturia  MSK: No joint pain/swelling; no back pain; no edema  Neuro: No dizziness/lightheadedness, weakness, seizures, numbness, tingling  Heme: No easy bruising or bleeding  Endo: No heat/cold intolerance  Psych: No significant nervousness, anxiety, stress, depression  All other systems were reviewed and are negative, except as noted.    PHYSICAL EXAM:  Constitutional: Well developed / well nourished  Eyes: Anicteric, PERRLA  ENMT: nc/at  Neck: supple  Respiratory: CTA anterior. chest tubes removed, previous site with clean dressing  Cardiovascular: RRR  Gastrointestinal: Soft, non distended, NT, incision healed   Genitourinary: voiding spontaneously  Extremities: SCD's in place and working bilaterally, no edema  Vascular: Palpable dp pulses bilaterally  Neurological: A&O x3  Skin: no rashes, ulcerations or lesions  Musculoskeletal: Moving all extremities  Psychiatric: Responsive

## 2022-06-20 NOTE — PROGRESS NOTE ADULT - SUBJECTIVE AND OBJECTIVE BOX
Neurology  Progress Note  06-20-22    Name:  CHAD DUNBAR; 66y    Interval History: Patient seen and examined at bedside. Per primary team, patient called son overnight reporting concern for seizure. During morning rounds he was reportedly more confused requiring re-orientation. Patient reports generalized weakness but denies headache, SOB, or acute pain.      Medications:  acetaminophen     Tablet .. 975 milliGRAM(s) Oral every 6 hours PRN  acetaminophen 10 mG/mL Injectable IVPB (Rx Quick Charge) 1000 milliGRAM(s) IV Intermittent   atorvastatin 40 milliGRAM(s) Oral at bedtime  BUpivacaine 0.5% injectable (Rx Quick Charge) 10 milliLiter(s) Local Injection   ceFAZolin 1 Gram(s) Injectable (Rx Quick Charge) 2000 milliGRAM(s) IV Push   chlorhexidine 2% Cloths 1 Application(s) Topical <User Schedule>  dextrose 5%. 1000 milliLiter(s) IV Continuous <Continuous>  dextrose Oral Gel 15 Gram(s) Oral once PRN  diVALproex DR 1000 milliGRAM(s) Oral two times a day  ePHEDrine 25 mG/5 mL NaCl 0.9% Injectable (Rx Quick Charge) 5 milliGRAM(s) IV Push   ePHEDrine 25 mG/5 mL NaCl 0.9% Injectable (Rx Quick Charge) 5 milliGRAM(s) IV Push   epoetin cortney-epbx (RETACRIT) Injectable 75175 Unit(s) IV Push once  fentaNYL 0.05 mG/mL Injectable (Rx Quick Charge) 50 MICROGram(s) IV Push   fentaNYL 0.05 mG/mL Injectable (Rx Quick Charge) 100 MICROGram(s) IV Push   fluconAZOLE   Tablet 200 milliGRAM(s) Oral daily  heparin   Injectable 5000 Unit(s) IV Push once  insulin glargine Injectable (LANTUS) 7 Unit(s) SubCutaneous at bedtime  insulin lispro (ADMELOG) corrective regimen sliding scale   SubCutaneous three times a day before meals  insulin lispro (ADMELOG) corrective regimen sliding scale   SubCutaneous at bedtime  insulin lispro Injectable (ADMELOG) 3 Unit(s) SubCutaneous three times a day before meals  levETIRAcetam 250 milliGRAM(s) Oral two times a day  levothyroxine 50 MICROGram(s) Oral daily  lidocaine PF 2% Injectable (Rx Quick Charge) 3 milliLiter(s) Local Injection   magnesium oxide 400 milliGRAM(s) Oral every 8 hours  morphine  - Injectable 1 milliGRAM(s) IV Push once  multiple electrolyte Injection Type 1 (NORMOSOL-R / PLASMA-LYTE A) (Rx Quick Charge) 300 milliLiter(s) IV Bolus   multiple electrolyte Injection Type 1 (NORMOSOL-R / PLASMA-LYTE A) (Rx Quick Charge) 200 milliLiter(s) IV Bolus   multiple electrolyte Injection Type 1 (NORMOSOL-R / PLASMA-LYTE A) (Rx Quick Charge) 100 milliLiter(s) IV Bolus   multivitamin 1 Tablet(s) Oral daily  mycophenolate mofetil 500 milliGRAM(s) Oral <User Schedule>  NIFEdipine XL 30 milliGRAM(s) Oral daily  ondansetron 2 mG/mL Injectable 2 mL (Rx Quick Charge) 4 milliGRAM(s) IV Push   pantoprazole    Tablet 40 milliGRAM(s) Oral before breakfast  phenylephrine 40 mG/250 mL NS Infusion Premix (Rx Quick Charge) 3.6744 milliGRAM(s) IV Continuous   polyethylene glycol 3350 17 Gram(s) Oral daily  predniSONE   Tablet 5 milliGRAM(s) Oral daily  propofol 10 mG/mL Injectable (Rx Quick Charge) 150 milliGRAM(s) IV Bolus   rocuronium 10 mG/mL Injectable (Rx Quick Charge) 20 milliGRAM(s) IV Push   rocuronium 10 mG/mL Injectable (Rx Quick Charge) 60 milliGRAM(s) IV Push   senna 2 Tablet(s) Oral at bedtime  sodium bicarbonate 1300 milliGRAM(s) Oral three times a day  sugammadex 100 mG/mL Injectable 2 mL vial (Rx Quick Charge) 300 milliGRAM(s) IV Push   tacrolimus 1 milliGRAM(s) Oral <User Schedule>  valGANciclovir 450 milliGRAM(s) Oral <User Schedule>      Vitals:  T(C): 37.1 (06-20-22 @ 09:30), Max: 37.1 (06-20-22 @ 09:00)  HR: 85 (06-20-22 @ 09:30) (75 - 90)  BP: 146/80 (06-20-22 @ 09:30) (143/72 - 172/72)  RR: 18 (06-20-22 @ 09:30) (18 - 18)  SpO2: 98% (06-20-22 @ 09:30) (93% - 98%)    Physical Examination:  General: Laying in bed, mildly underweight, in no acute distress  Respiratory: On NC, in no acute respiratory distress  Skin: warm, well perfused  Neurologic:  - Mental Status: Awake with eyes open but lethargic, oriented to person, place (hospital), and time (June, states the 27th, 2022); Speech is mildly hypophonic and slowed with intermittent pauses but grossly fluent with intact comprehension; Follows simple commands although occasionally requires repetition to complete task.  - Cranial Nerves: Visual fields are full to confrontation; Surgical pupils, round, and reactive to light; Extraocular movements are intact without nystagmus. No facial asymmetry. Hearing is intact to conversation.  - Motor: Strength is 5/5 throughout. There is no pronator drift. Normal muscle bulk and tone throughout.  - Reflexes: 2+ and symmetric at the biceps, 3+ knees with prepatellar clonus L>R, and 4+ ankles with 4-5 beats of clonus b/l. Plantar responses with withdrawal b/l.  - Sensory: Intact throughout to light touch  - Coordination: Finger-nose-finger with tremors b/l, worse on the R due to UE weakness.  - Gait: Deferred    Labs:                        8.4    4.93  )-----------( 213      ( 20 Jun 2022 06:26 )             26.9     06-20    139  |  105  |  34<H>  ----------------------------<  174<H>  4.4   |  22  |  1.54<H>    Ca    8.3<L>      20 Jun 2022 06:24  Phos  2.5     06-20  Mg     1.8     06-20    TPro  5.4<L>  /  Alb  2.0<L>  /  TBili  0.3  /  DBili  x   /  AST  19  /  ALT  10  /  AlkPhos  78  06-20    CAPILLARY BLOOD GLUCOSE  POCT Blood Glucose.: 167 mg/dL (20 Jun 2022 08:33)    LIVER FUNCTIONS - ( 20 Jun 2022 06:24 )  Alb: 2.0 g/dL / Pro: 5.4 g/dL / ALK PHOS: 78 U/L / ALT: 10 U/L / AST: 19 U/L / GGT: x             Culture - Blood (collected 19 Jun 2022 06:19)  Source: .Blood Blood  Preliminary Report (20 Jun 2022 09:00):    No growth to date.      PT/INR - ( 20 Jun 2022 06:27 )   PT: 15.4 sec;   INR: 1.32 ratio    PTT - ( 20 Jun 2022 06:27 )  PTT:33.5 sec          Radiology:  CT Head No Cont (06.10.22 @ 17:28)  IMPRESSION:  Noacute intracranial hemorrhage. Chronic findings as above.

## 2022-06-20 NOTE — ADVANCED PRACTICE NURSE CONSULT - REASON FOR CONSULT
Requested by staff to re-evaluate skin status of pt a/w a pressure injury.  PMH is noted:  66 yr old Male with PMHx ESRD s/p permacath removal 5/10/22 s/p Rt DDRT 4/21/22,  CVA (2019), Afib on apixaban, seizure activity, CAD s/p stents, CABG (2020), HTN, HLD, DM on insulin, gastric/duodenal ulcer, recent hospitalization 4/28/22 -5/10/22 with weakness/anemia found to have perinephric hematoma requiring evacuation and repair of bleeding arterial anastomosis. Currently being treated for UTI with Levaquin Today after developing multiple seizure episodes refractory to 5 mg versed IM (EMS) and 2 mg ativan IV in E.D. concerning for status epilepticus requiring intubation for hypoxic respiratory  failure. MICU Consult was called for hypoxic respiratory failure secondary to status epilepticus.     In the ED VS temp 97.8 Axillary, Hrt rate 61 RR26 100% on NRB mask   Per ED note, the patient was noted to be gurgling and was unable to protect airway, and was intubated. The patient has now been accepted to the MICU for further management.  Since last assessment by the [facility] on 6/13, the pt was transferred from [facility] to [facility].
Requested by staff to assess skin status of pt a/w a pressure injury. PMH is noted:  66 yr old Male with PMHx ESRD s/p permacath removal 5/10/22 s/p Rt DDRT 4/21/22,  CVA (2019), Afib on apixaban, seizure activity, CAD s/p stents, CABG (2020), HTN, HLD, DM on insulin, gastric/duodenal ulcer, recent hospitalization 4/28/22 -5/10/22 with weakness/anemia found to have perinephric hematoma requiring evacuation and repair of bleeding arterial anastomosis. Currently being treated for UTI with Levaquin Today after developing multiple seizure episodes refractory to 5 mg versed IM (EMS) and 2 mg ativan IV in E.D. concerning for status epilepticus requiring intubation for hypoxic respiratory  failure. MICU Consult was called for hypoxic respiratory failure secondary to status epilepticus.     In the ED VS temp 97.8 Axillary, Hrt rate 61 RR26 100% on NRB mask   Per ED noted the patient was noted to be gurgling and was unable to protect airway, and was intubated. The patient has now been accepted to the MICU for further management

## 2022-06-20 NOTE — PROGRESS NOTE ADULT - ASSESSMENT
67yo man with PMH of CVA (2019) with subsequent seizure disorder, ESRD s/p renal transplant (04/2022), afib (on apixaban), CAD (s/p stents), CABG (2020), HTN, HLD, DM presents to the ED with status epilepticus from [facility]. Semiology includes eyes deviated to the R, rhythmic R facial twitching as well as R shoulder clonic movements lasting approximately 30 seconds each. Of note patient is receiving antibiotics for Klebsiella UTI. Patient received Versed 5mg with EMS, Ativan 2mg and Keppra 1g load in the ED. Labs remarkable for VPA level 34. EEG from 04/2022 showed L frontocentral focal onset seizures. S/p intubation on 6/11. EEG negative for seizure but showed structural or functional abnormality in the left fronto-temporal region and moderate to severe nonspecific diffuse or multifocal cerebral dysfunction.    Impression: Status epilepticus likely provoked in the setting of UTI and insufficient AED coverage, with possible decreased seizure threshold in the setting of known focal motor seizures. Change in mental status possibly due to hospital-induced delirium, r/o toxic-metabolic encephalopathy.    Recommendations:  [] s/p EEG  [] MR brain w/o contrast  [] Telemetry monitoring  [] C/w Keppra 250mg IV q12h and  mg IV q8  [] Recheck valproic acid levels today prior to evening dose  [] CBC, CMP, ammonia, ABG  [] Neuro checks and vital signs per unit protocol  [] Seizure/fall/aspiration precautions  [] Advise against driving, operating heavy machinery, water sports/bathing, activity in elevation without appropriate safety precautions    Case seen and discussed with neurology attending Dr. Domingo.

## 2022-06-20 NOTE — PROGRESS NOTE ADULT - PROBLEM SELECTOR PLAN 1
S/p DDRT on 4/21/22 Cr. stable in 2 range. ANA in the setting of hemodynamic instability 2/2 ATN. Donor was 58 , KDPI 82%, DCD, single vessels and ureter, HLA mismatch 1, 2, 2. No DSA, cPRA 0%. 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 Readmitted in May for anemia in the setting of large perinephric hematoma s/p evacuation and repair of arterial anastomosis on 5/2/22. Intra operative biopsy showed no rejection. As outpatient SCr. in th low 2 range. Admitted with lowest documented Cr. of 1.8 since transplant which has trended to 1.5 today. S/p Ertapenem for UTI. Now off Abx. Optimize hemodynamics. Adequate UOP. C/w NaHCO3 for acidosis and will help with kaliuresis. C/w Nifedipine 30mg Daily for hypertension.    F/u Neurology for waxing and waning delirium. Head CT?

## 2022-06-21 NOTE — PROGRESS NOTE ADULT - SUBJECTIVE AND OBJECTIVE BOX
[facility] DIVISION OF KIDNEY DISEASES AND HYPERTENSION -- FOLLOW UP NOTE  --------------------------------------------------------------------------------  HPI:  66 yr old man with ESRD due to DM was on HD since 2019, s/p DCD DDRT on 4/21/22. Donor was 58 , KDPI 82%, DCD, single vessels and ureter, HLA mismatch 1, 2, 2. No DSA, cPRA 0%. 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 Readmitted in May for anemia in the setting of large perinephric hematoma s/p evacuation and repair of arterial anastomosis on 5/2/22. Intra operative biopsy showed no rejection, Creatinine ranging 2mg/dL. He was recently dx with klebsiella UTI rx with ertapenem - then switched to Levaquin day #6. He also had parainfluenza pneumonia. Was at rehab progressing well. Presented with status epilepticus. Intubated for respiratory protection. Right pleural effusion drained 1300ml. Transfused 1 unit PRBC. Pt. transferred to SICU and was transfused 4 additional units for a total of 6 units.     Pt. seen this AM. Appears more confused today. Complaining of RUE weakness. Had MRI last night. CAlled RRT this AM had non-contrast Head CT. Results reviewed with neurology. Negative for acute stroke, results concerning for seizure and PRES syndrome. Being attributed in part to CNI.      PAST HISTORY  --------------------------------------------------------------------------------  No significant changes to PMH, PSH, FHx, SHx, unless otherwise noted    ALLERGIES & MEDICATIONS  --------------------------------------------------------------------------------  Allergies    No Known Allergies    Intolerances      Standing Inpatient Medications  atorvastatin 40 milliGRAM(s) Oral at bedtime  chlorhexidine 2% Cloths 1 Application(s) Topical <User Schedule>  dextrose 5%. 1000 milliLiter(s) IV Continuous <Continuous>  dextrose 50% Injectable 25 Gram(s) IV Push once  dextrose 50% Injectable 12.5 Gram(s) IV Push once  dextrose 50% Injectable 25 Gram(s) IV Push once  diVALproex DR 1000 milliGRAM(s) Oral two times a day  fluconAZOLE   Tablet 200 milliGRAM(s) Oral daily  insulin glargine Injectable (LANTUS) 7 Unit(s) SubCutaneous at bedtime  insulin lispro (ADMELOG) corrective regimen sliding scale   SubCutaneous three times a day before meals  insulin lispro (ADMELOG) corrective regimen sliding scale   SubCutaneous at bedtime  insulin lispro Injectable (ADMELOG) 5 Unit(s) SubCutaneous three times a day before meals  levETIRAcetam 250 milliGRAM(s) Oral two times a day  levothyroxine 50 MICROGram(s) Oral daily  magnesium oxide 400 milliGRAM(s) Oral every 8 hours  multivitamin 1 Tablet(s) Oral daily  mycophenolate mofetil 500 milliGRAM(s) Oral <User Schedule>  NIFEdipine XL 30 milliGRAM(s) Oral daily  pantoprazole    Tablet 40 milliGRAM(s) Oral before breakfast  polyethylene glycol 3350 17 Gram(s) Oral daily  predniSONE   Tablet 5 milliGRAM(s) Oral daily  senna 2 Tablet(s) Oral at bedtime  sodium bicarbonate 1300 milliGRAM(s) Oral three times a day  tacrolimus 1 milliGRAM(s) Oral <User Schedule>  valGANciclovir 450 milliGRAM(s) Oral <User Schedule>    PRN Inpatient Medications  acetaminophen     Tablet .. 975 milliGRAM(s) Oral every 6 hours PRN  dextrose Oral Gel 15 Gram(s) Oral once PRN      REVIEW OF SYSTEMS  --------------------------------------------------------------------------------  Limited ROS: No pain, no dyspnea RUE weakness     VITALS/PHYSICAL EXAM  --------------------------------------------------------------------------------  T(C): 36.5 (06-21-22 @ 08:31), Max: 37.1 (06-21-22 @ 05:00)  HR: 93 (06-21-22 @ 08:31) (74 - 93)  BP: 166/70 (06-21-22 @ 08:31) (139/68 - 166/70)  RR: 18 (06-21-22 @ 08:31) (18 - 18)  SpO2: 94% (06-21-22 @ 08:31) (94% - 99%)  Wt(kg): --        06-20-22 @ 07:01  -  06-21-22 @ 07:00  --------------------------------------------------------  IN: 1060 mL / OUT: 1300 mL / NET: -240 mL      Physical Exam:  	Gen: Awake but confused, repeating words  	HEENT: PERRL, MMM   	Pulm: CTAB anteriorly  	CV: S1S2+  	Abd: +BS, soft, non-tender          Transplant: No tenderness, swelling  	: No suprapubic tenderness, external catheter  	MSK: no LE edema, some bilateral UE edema  R>L- improving. B/l tremors R>L. RUE weakness  	Psych: Confused  	Skin: Warm          Access: Peripheral     LABS/STUDIES  --------------------------------------------------------------------------------              7.6    3.78  >-----------<  235      [06-21-22 @ 08:58]              23.8     141  |  105  |  35  ----------------------------<  134      [06-21-22 @ 08:58]  4.5   |  24  |  1.46        Ca     8.1     [06-21-22 @ 08:58]      Mg     1.9     [06-21-22 @ 08:58]      Phos  2.4     [06-21-22 @ 08:58]    TPro  5.7  /  Alb  2.2  /  TBili  0.3  /  DBili  x   /  AST  20  /  ALT  11  /  AlkPhos  79  [06-21-22 @ 08:58]    PT/INR: PT 14.7 , INR 1.28       [06-21-22 @ 06:16]  PTT: 33.6       [06-21-22 @ 06:16]    CK 28      [06-21-22 @ 08:58]    Creatinine Trend:  SCr 1.46 [06-21 @ 08:58]  SCr 1.53 [06-21 @ 06:16]  SCr 1.54 [06-20 @ 06:24]  SCr 1.68 [06-19 @ 06:19]  SCr 1.76 [06-18 @ 06:47]    Tacrolimus (), Serum: 5.2 ng/mL (06-20 @ 06:26)  Tacrolimus (), Serum: 2.8 ng/mL (06-19 @ 06:19)  Tacrolimus (), Serum: 6.3 ng/mL (06-18 @ 06:49)  Tacrolimus (), Serum: 6.1 ng/mL (06-17 @ 05:58)      Urinalysis - [06-10-22 @ 22:28]      Color Light Yellow / Appearance Clear / SG 1.016 / pH 6.0      Gluc 100 mg/dL / Ketone Negative  / Bili Negative / Urobili Negative       Blood Small / Protein Trace / Leuk Est Negative / Nitrite Negative      RBC 25 / WBC 5 / Hyaline  / Gran  / Sq Epi  / Non Sq Epi 1 / Bacteria Negative      Iron 17, TIBC 171, %sat 10      [05-02-22 @ 13:03]  Ferritin 1505      [05-02-22 @ 13:05]  PTH -- (Ca 9.2)      [02-05-22 @ 10:13]   294  HbA1c 6.0      [02-21-20 @ 08:53]  Lipid: chol 118, TG 54, HDL 59, LDL --      [06-27-21 @ 09:44]

## 2022-06-21 NOTE — PROGRESS NOTE ADULT - PROBLEM SELECTOR PLAN 1
S/p DDRT on 4/21/22 Cr. stable in 2 range. ANA in the setting of hemodynamic instability 2/2 ATN. Donor was 58 , KDPI 82%, DCD, single vessels and ureter, HLA mismatch 1, 2, 2. No DSA, cPRA 0%. 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 Readmitted in May for anemia in the setting of large perinephric hematoma s/p evacuation and repair of arterial anastomosis on 5/2/22. Intra operative biopsy showed no rejection. As outpatient SCr. in th low 2 range. Admitted with lowest documented Cr. of 1.8 since transplant which has trended to 1.4 today. S/p Ertapenem for UTI. Now off Abx. Optimize hemodynamics. Adequate UOP. C/w NaHCO3 for acidosis and will help with kaliuresis. C/w Nifedipine 30mg Daily for hypertension.    On Depakote and Keppra for seizures. Depakote levels noted low this AM and dose increased.

## 2022-06-21 NOTE — PROGRESS NOTE ADULT - ASSESSMENT
66 yr old Male with PMHx ESRD s/p permacath removal 5/10/22 s/p Rt DDRT 4/21/22,  CVA (2019), Afib on apixaban, seizure activity, CAD s/p stents, CABG (2020), HTN, HLD, DM on insulin, gastric/duodenal ulcer, recent hospitalization 4/28/22 -5/10/22 with weakness/anemia found to have perinephric hematoma requiring evacuation and repair of bleeding arterial anastomosis. Curently being treated for UTI with Levaquin Today after developing multiple sz episodes refractory to 5 mg versed IM (EMS) and 2 mg ativan IV in E.D. concerning for status epilepticus requiring intubation for hypoxic respiratory  failure. MICU Consult was called for hypoxic respiratory failure secondary to status epilepticus.  Nephrology consulted for renal failure.       A/P    DDRT on 04/21/22  Course complicated by DGF, was on HD until 4/29/22. Readmitted in May for anemia in the setting of large perinephric hematoma s/p evacuation and repair of arterial anastomosis on 5/2/22. Intra operative biopsy showed no rejection.  ANA likely sec to  hemodynamic change   Renal function improving   monitor BMP, U/O     HTN   fluctuating   monitor BP closely     Immunosuppresssion  continue per transplant team

## 2022-06-21 NOTE — PROGRESS NOTE ADULT - ASSESSMENT
66 yr old man with h/o DM type II, HTN, CAD s/p CABG in 2020, Afib on Eliquis, CVA in 2019 due to Afib, Seizure d/o (last episode was on 4/8/22), h/o GI bleed in 2/2022 EGD with duodenal ulcer and ESRD on HD s/p R DDRT from DCD donor on 4/21/22 complicated by DGF requiring HD until 4/29/22 now with good graft function who presented with status epilepticus in setting of UTI/antibiotics and sub-therapeutic valproic acid level     Neuro:  - Imaging concerning for PRES: FK discontinued; plan to start Belatacept. Keep SBP < 150  - ? Seizure activity this afternoon; Ativan 1mg, EEG ordered   - On Keppra and Valproic acid, adjusted by Neuro  - Keep Mag > 2    R DCD DDRT (ureter stented) 4/21/22  - stable renal function   - Strict I/Os  - Regular diet  - will attempt to remove stent this admission    Immunosuppression:   -Discontinue FK; plan to start Belatacept Thursday. MMF 500mg bid, Pred 5  -PPX: Valcyte (MWF), Fluconazole 200mg daily (on fluconazole after repair of arterial anastomosis).  Hold Bactrim for hyperkalemia.    R Pleural effusion  -resolved, chest tubes removed    Klebsiella UTI  - ID following, ertapenem course completed.   - Blood Cx  6/10 ngtd    DM  - on Lantus/Lispro, Endocrine following     HTN:  -cont Nifedipine    Dispo  -PT following

## 2022-06-21 NOTE — RAPID RESPONSE TEAM SUMMARY - NSSITUATIONBACKGROUNDRRT_GEN_ALL_CORE
66 yr old man with h/o DM type II, HTN, CAD s/p CABG in 2020, Afib on Eliquis, CVA in 2019 due to Afib, Seizure d/o (last episode was on 4/8/22), h/o GI bleed in 2/2022 EGD with duodenal ulcer and ESRD on HD s/p R DDRT from DCD donor on 4/21/22 complicated by DGF requiring HD until 4/29/22 now with good graft function who presented with status epilepticus in setting of UTI/antibiotics and sub-therapeutic valproic acid level     RRT called as per protocol in order to call code stroke for right sided weakness    Patient seen and examined at bedside. Vital signs stable.    FOCUSED PHYSICAL EXAM:  AAOx1, lethargic, tremulous, coughing  PERRL, EOMI  Bilateral extremity weakness, R>L, new finding this morning as per primary team    Labs and meds reviewed in EMR.    POC glucose: 116    ASSESSMENT: New right sided extremity weakness, RRT called for code stroke. Neuro evaluated at bedside, pt had MRI yesterday w/ no signs of stroke. Low concern for stroke overall however will order CT head.    PLAN:  - CT head no contrast  - Labs for metabolic workup including CBC, BMP, VBG, lactate, CK, blood cultures  - CXR  - F/u neurology recs    Plan discussed with primary team.    Basil Mckeon MD  PGY3, Internal Medicine Resident  [id number] (NS) / [id number] (RUFINA)       66 yr old man with h/o DM type II, HTN, CAD s/p CABG in 2020, Afib on Eliquis, CVA in 2019 due to Afib, Seizure d/o (last episode was on 4/8/22), h/o GI bleed in 2/2022 EGD with duodenal ulcer and ESRD on HD s/p R DDRT from DCD donor on 4/21/22 complicated by DGF requiring HD until 4/29/22 now with good graft function who presented with status epilepticus in setting of UTI/antibiotics and sub-therapeutic valproic acid level     RRT called as per protocol in order to call code stroke for right sided weakness    Patient seen and examined at bedside. Vital signs stable.    FOCUSED PHYSICAL EXAM:  AAOx1, lethargic, tremulous, coughing  PERRL, EOMI  Bilateral extremity weakness, R>L, new finding this morning as per primary team    Labs and meds reviewed in EMR.    POC glucose: 116    ASSESSMENT: New right sided extremity weakness, RRT called for code stroke. Neuro evaluated at bedside, pt had MRI yesterday w/ no signs of stroke. Low concern for stroke overall however will order CT head.    PLAN:  - CT head no contrast  - F/u MRI official read  - Labs for metabolic workup including CBC, BMP, VBG, lactate, CK, blood cultures  - CXR  - F/u neurology recs    Plan discussed with primary team.    Basil Mckeon MD  PGY3, Internal Medicine Resident  [id number] (NS) / [id number] (RUFINA)

## 2022-06-21 NOTE — PROGRESS NOTE ADULT - ASSESSMENT
66 yr old M with Type 2 DM A1C 6.6 c/b ESRD s/p DDRT on 3/21, CVA, CAD s/p CABG, Afib on apixaban, seizure activity, HTN, HLD, h/o GI bleed in 2/2022 EGD with duodenal ulcer, discharged to [facility] rehab center after prior hospitalization, now re-admitted from [facility] for seizures c/f status epilepticus in setting of UTI, requiring intubation for hypoxic respiratory failure and admission to MICU, ccb pleural effusion c/b bleeding s/p VATS. Now extubated and transferred to medicine floor. 6/21 s/p RRT code stroke per d/w team possible PRES   Endocrine consulted for T2DM mgmt with hyper and hypo glycemia    #T2DM with complications with hyper and hypoglycemia   Poor po intake today w/ BG tightly controlled & hypoglycemia on serum glucose this am ,  decrease lantus. has not yet received new bolus dose, unable to titrate bolus dosing  at this time   - goal glucose 100-180  - Recommend to reduce  Lantus 5 Units HS and c/w Admelog to 5 Units TIDAC (hold if npo or not tolerating PO) plus low scale before meals and bedtime  - check FS AC/HS + 3 AM   - low pre-meal and low bedtime correction scales   - encourage PO intake   - carb consistent diet  Discharge  - Plan TBD based on insulin requirements at [place]. Outpatient f/u with Endocrinologist Dr. Lincoln.     #HTN  goal BP<130/80  - manage per primary team    #HLD  - continue statin     #Hypothyroidism  - continue LT4 50 mcg daily  - monitor TFTs outpatient      Discussed with patient and primary  team Transplant and RN   Contact via [employer] Teams during business hours  On evenings and weekends, please call [phone] or page endocrine fellow on call.   Please note that this patient may be followed by different provider tomorrow.    Time spent on encounter: 26 minutes spent on total encounter; The necessity of the time spent during the encounter on this date of service was due to development of plan of care/coordination of care/glycemic control through review of labs, blood glucose values and vital signs.

## 2022-06-21 NOTE — PROGRESS NOTE ADULT - SUBJECTIVE AND OBJECTIVE BOX
DIABETES FOLLOW UP : Seen earlier today    INTERVAL HX: rrt/code stroke this am . at time of visit pt  answering simple yes/no questions. FBG tightly controlled to 76, serum BG 61 hypoglycemic, pt was npo for breakfast due to RRT, diet reinstated for lunch admelog held as pt not eating lunch. 3am FS not checked, spoke with RN to please sign out to night RN to check 3 am FS     Review of Systems:  General: As above  Cardiovascular: No chest pain  Respiratory: No SOB  GI: No nausea, vomiting  Endocrine: denies hypoglycemia symptoms     Allergies    No Known Allergies    Intolerances      MEDICATIONS  (STANDING):  atorvastatin 40 milliGRAM(s) Oral at bedtime  chlorhexidine 2% Cloths 1 Application(s) Topical <User Schedule>  dextrose 5%. 1000 milliLiter(s) (50 mL/Hr) IV Continuous <Continuous>  dextrose 50% Injectable 25 Gram(s) IV Push once  dextrose 50% Injectable 12.5 Gram(s) IV Push once  dextrose 50% Injectable 25 Gram(s) IV Push once  diVALproex DR 1000 milliGRAM(s) Oral two times a day  fluconAZOLE   Tablet 200 milliGRAM(s) Oral daily  insulin glargine Injectable (LANTUS) 7 Unit(s) SubCutaneous at bedtime  insulin lispro (ADMELOG) corrective regimen sliding scale   SubCutaneous three times a day before meals  insulin lispro (ADMELOG) corrective regimen sliding scale   SubCutaneous at bedtime  insulin lispro Injectable (ADMELOG) 5 Unit(s) SubCutaneous three times a day before meals  levETIRAcetam 250 milliGRAM(s) Oral two times a day  levothyroxine 50 MICROGram(s) Oral daily  magnesium oxide 400 milliGRAM(s) Oral every 8 hours  multivitamin 1 Tablet(s) Oral daily  mycophenolate mofetil 500 milliGRAM(s) Oral <User Schedule>  NIFEdipine XL 30 milliGRAM(s) Oral once  pantoprazole    Tablet 40 milliGRAM(s) Oral before breakfast  polyethylene glycol 3350 17 Gram(s) Oral daily  predniSONE   Tablet 5 milliGRAM(s) Oral daily  senna 2 Tablet(s) Oral at bedtime  sodium bicarbonate 1300 milliGRAM(s) Oral three times a day  valGANciclovir 450 milliGRAM(s) Oral <User Schedule>      PHYSICAL EXAM:  VITALS: T(C): 36.6 (06-21-22 @ 13:00)  T(F): 97.8 (06-21-22 @ 13:00), Max: 98.7 (06-21-22 @ 05:00)  HR: 82 (06-21-22 @ 13:00) (74 - 93)  BP: 145/61 (06-21-22 @ 13:00) (145/61 - 166/70)  RR:  (18 - 18)  SpO2:  (91% - 99%)  Wt(kg): --  GENERAL: male laying in bed in NAD  Respiratory: Respirations unlabored   Extremities: Warm  NEURO: Alert , answering simple  yes/no questions    LABS:  POCT Blood Glucose.: 123 mg/dL (06-21-22 @ 12:32)  POCT Blood Glucose.: 116 mg/dL (06-21-22 @ 08:33)  POCT Blood Glucose.: 76 mg/dL (06-21-22 @ 07:48)  POCT Blood Glucose.: 176 mg/dL (06-20-22 @ 21:43)  POCT Blood Glucose.: 238 mg/dL (06-20-22 @ 17:31)  POCT Blood Glucose.: 278 mg/dL (06-20-22 @ 13:13)  POCT Blood Glucose.: 167 mg/dL (06-20-22 @ 08:33)  POCT Blood Glucose.: 207 mg/dL (06-19-22 @ 21:34)  POCT Blood Glucose.: 246 mg/dL (06-19-22 @ 17:16)  POCT Blood Glucose.: 137 mg/dL (06-19-22 @ 12:56)  POCT Blood Glucose.: 205 mg/dL (06-19-22 @ 08:36)  POCT Blood Glucose.: 122 mg/dL (06-18-22 @ 21:19)  POCT Blood Glucose.: 101 mg/dL (06-18-22 @ 18:09)  POCT Blood Glucose.: 70 mg/dL (06-18-22 @ 17:21)  POCT Blood Glucose.: 72 mg/dL (06-18-22 @ 17:19)                            7.6    3.78  )-----------( 235      ( 21 Jun 2022 08:58 )             23.8       06-21    141  |  105  |  35<H>  ----------------------------<  134<H>  4.5   |  24  |  1.46<H>    Ca    8.1<L>      21 Jun 2022 08:58  Phos  2.4     06-21  Mg     1.9     06-21    TPro  5.7<L>  /  Alb  2.2<L>  /  TBili  0.3  /  DBili  x   /  AST  20  /  ALT  11  /  AlkPhos  79  06-21      eGFR: 52 mL/min/1.73m2 (21 Jun 2022 08:58)  eGFR: 50 mL/min/1.73m2 (21 Jun 2022 06:16)          Thyroid Function Tests:          A1C with Estimated Average Glucose Result: 6.6 % (04-24-22 @ 17:12)      Estimated Average Glucose: 143 mg/dL (04-24-22 @ 17:12)                         DIABETES FOLLOW UP : Seen earlier today    INTERVAL HX: rrt/code stroke this am . at time of visit pt  answering simple yes/no questions. FBG tightly controlled to 76, serum BG 61 hypoglycemic, pt was not eating for breakfast due to RRT, diet reinstated for lunch admelog held as pt not eating lunch. 3am FS not checked, spoke with RN to please sign out to night RN to check 3 am FS     Review of Systems:  General: As above  Cardiovascular: No chest pain  Respiratory: No SOB  GI: No nausea, vomiting  Endocrine: denies hypoglycemia symptoms     Allergies    No Known Allergies    Intolerances      MEDICATIONS  (STANDING):  atorvastatin 40 milliGRAM(s) Oral at bedtime  chlorhexidine 2% Cloths 1 Application(s) Topical <User Schedule>  dextrose 5%. 1000 milliLiter(s) (50 mL/Hr) IV Continuous <Continuous>  dextrose 50% Injectable 25 Gram(s) IV Push once  dextrose 50% Injectable 12.5 Gram(s) IV Push once  dextrose 50% Injectable 25 Gram(s) IV Push once  diVALproex DR 1000 milliGRAM(s) Oral two times a day  fluconAZOLE   Tablet 200 milliGRAM(s) Oral daily  insulin glargine Injectable (LANTUS) 7 Unit(s) SubCutaneous at bedtime  insulin lispro (ADMELOG) corrective regimen sliding scale   SubCutaneous three times a day before meals  insulin lispro (ADMELOG) corrective regimen sliding scale   SubCutaneous at bedtime  insulin lispro Injectable (ADMELOG) 5 Unit(s) SubCutaneous three times a day before meals  levETIRAcetam 250 milliGRAM(s) Oral two times a day  levothyroxine 50 MICROGram(s) Oral daily  magnesium oxide 400 milliGRAM(s) Oral every 8 hours  multivitamin 1 Tablet(s) Oral daily  mycophenolate mofetil 500 milliGRAM(s) Oral <User Schedule>  NIFEdipine XL 30 milliGRAM(s) Oral once  pantoprazole    Tablet 40 milliGRAM(s) Oral before breakfast  polyethylene glycol 3350 17 Gram(s) Oral daily  predniSONE   Tablet 5 milliGRAM(s) Oral daily  senna 2 Tablet(s) Oral at bedtime  sodium bicarbonate 1300 milliGRAM(s) Oral three times a day  valGANciclovir 450 milliGRAM(s) Oral <User Schedule>      PHYSICAL EXAM:  VITALS: T(C): 36.6 (06-21-22 @ 13:00)  T(F): 97.8 (06-21-22 @ 13:00), Max: 98.7 (06-21-22 @ 05:00)  HR: 82 (06-21-22 @ 13:00) (74 - 93)  BP: 145/61 (06-21-22 @ 13:00) (145/61 - 166/70)  RR:  (18 - 18)  SpO2:  (91% - 99%)  Wt(kg): --  GENERAL: male laying in bed in NAD  Respiratory: Respirations unlabored   Extremities: Warm  NEURO: Alert , answering simple  yes/no questions    LABS:  POCT Blood Glucose.: 123 mg/dL (06-21-22 @ 12:32)  POCT Blood Glucose.: 116 mg/dL (06-21-22 @ 08:33)  POCT Blood Glucose.: 76 mg/dL (06-21-22 @ 07:48)  POCT Blood Glucose.: 176 mg/dL (06-20-22 @ 21:43)  POCT Blood Glucose.: 238 mg/dL (06-20-22 @ 17:31)  POCT Blood Glucose.: 278 mg/dL (06-20-22 @ 13:13)  POCT Blood Glucose.: 167 mg/dL (06-20-22 @ 08:33)  POCT Blood Glucose.: 207 mg/dL (06-19-22 @ 21:34)  POCT Blood Glucose.: 246 mg/dL (06-19-22 @ 17:16)  POCT Blood Glucose.: 137 mg/dL (06-19-22 @ 12:56)  POCT Blood Glucose.: 205 mg/dL (06-19-22 @ 08:36)  POCT Blood Glucose.: 122 mg/dL (06-18-22 @ 21:19)  POCT Blood Glucose.: 101 mg/dL (06-18-22 @ 18:09)  POCT Blood Glucose.: 70 mg/dL (06-18-22 @ 17:21)  POCT Blood Glucose.: 72 mg/dL (06-18-22 @ 17:19)                            7.6    3.78  )-----------( 235      ( 21 Jun 2022 08:58 )             23.8       06-21    141  |  105  |  35<H>  ----------------------------<  134<H>  4.5   |  24  |  1.46<H>    Ca    8.1<L>      21 Jun 2022 08:58  Phos  2.4     06-21  Mg     1.9     06-21    TPro  5.7<L>  /  Alb  2.2<L>  /  TBili  0.3  /  DBili  x   /  AST  20  /  ALT  11  /  AlkPhos  79  06-21      eGFR: 52 mL/min/1.73m2 (21 Jun 2022 08:58)  eGFR: 50 mL/min/1.73m2 (21 Jun 2022 06:16)          Thyroid Function Tests:          A1C with Estimated Average Glucose Result: 6.6 % (04-24-22 @ 17:12)      Estimated Average Glucose: 143 mg/dL (04-24-22 @ 17:12)

## 2022-06-21 NOTE — PROGRESS NOTE ADULT - ASSESSMENT
65yo man with PMH of CVA (2019) with subsequent seizure disorder, ESRD s/p renal transplant (04/2022), afib (on apixaban), CAD (s/p stents), CABG (2020), HTN, HLD, DM presents to the ED with status epilepticus from [facility]. Semiology includes eyes deviated to the R, rhythmic R facial twitching as well as R shoulder clonic movements lasting approximately 30 seconds each. Of note patient is receiving antibiotics for Klebsiella UTI. Patient received Versed 5mg with EMS, Ativan 2mg and Keppra 1g load in the ED. Labs remarkable for VPA level 34. EEG from 04/2022 showed L frontocentral focal onset seizures. S/p intubation on 6/11. EEG negative for seizure but showed structural or functional abnormality in the left fronto-temporal region and moderate to severe nonspecific diffuse or multifocal cerebral dysfunction. RRT called 6/21 for decreased movement of RUE but with increased tone and tremor like activity/ clonus and diffuse increased tone and increased encephalopathy concerning for seizure. Had recent MRI day prior without signs of acute stroke. Code stroke ended w/ neuro attending given no clear LKW and exam/history concerning for seizure.     Impression: Status epilepticus likely provoked in the setting of infection, with possible decreased seizure threshold in the setting of known focal motor seizures. Change in mental status possibly due to hospital-induced delirium with toxic-metabolic encephalopathy. RRT on 6/21 concerning for seizure of L frontocentral focal onset with generalization.    Recommendations:  [x] s/p EEG  [x] MR brain w/o contrast  [x] Telemetry monitoring  [x] On 6/21 s/p valproic acid load 1500mg IV x1, continue with keppra 250mg q12h (IV or PO) and valproic acid 1000mg BID PO (if not able to tolerate PO then switch to IV 750mg q8hrs)  [ ] Recheck valproic acid levels tomorrow AM w/ CMP  [x] CBC, CMP, VBG w/ lactate, CK   [] Neuro checks and vital signs per unit protocol  [] Seizure/fall/aspiration precautions  [] Advise against driving, operating heavy machinery, water sports/bathing, activity in elevation without appropriate safety precautions    Case seen and discussed with neurology attending Dr. Domingo. 67yo man with PMH of CVA (2019) with subsequent seizure disorder, ESRD s/p renal transplant (04/2022), afib (on apixaban), CAD (s/p stents), CABG (2020), HTN, HLD, DM presents to the ED with status epilepticus from [facility]. Semiology includes eyes deviated to the R, rhythmic R facial twitching as well as R shoulder clonic movements lasting approximately 30 seconds each. Of note patient is receiving antibiotics for Klebsiella UTI. Patient received Versed 5mg with EMS, Ativan 2mg and Keppra 1g load in the ED. Labs remarkable for VPA level 34. EEG from 04/2022 showed L frontocentral focal onset seizures. S/p intubation on 6/11. EEG negative for seizure but showed structural or functional abnormality in the left fronto-temporal region and moderate to severe nonspecific diffuse or multifocal cerebral dysfunction. RRT called 6/21 for decreased movement of RUE but with increased tone and tremor like activity/ clonus and diffuse increased tone and increased encephalopathy concerning for seizure. Had recent MRI day prior without signs of acute stroke. Code stroke ended w/ neuro attending given no clear LKW and exam/history concerning for seizure.     Impression: Status epilepticus likely provoked in the setting of infection, with possible decreased seizure threshold in the setting of known focal motor seizures. Change in mental status possibly due to hospital-induced delirium with toxic-metabolic encephalopathy. RRT on 6/21 concerning for seizure of L frontocentral focal onset with generalization. MRI with findings that may be suggestive of PRES, and given patient is on tacrolimus, may need to re-evaluate therapy with heme/onc team.    Recommendations:  [ ] BP goals of normotension if not contraindicated given concerns of possible PRES per MRI report.  [x] s/p EEG  [x] MR brain w/o contrast  [x] Telemetry monitoring  [x] On 6/21 s/p valproic acid load 1500mg IV x1, continue with keppra 250mg q12h (IV or PO) and valproic acid 1000mg BID PO (if not able to tolerate PO then switch to IV 750mg q8hrs)  [ ] Recheck valproic acid levels tomorrow AM w/ CMP  [x] CBC, CMP, VBG w/ lactate, CK   [] Neuro checks and vital signs per unit protocol  [] Seizure/fall/aspiration precautions  [] Advise against driving, operating heavy machinery, water sports/bathing, activity in elevation without appropriate safety precautions    Case seen and discussed with neurology attending Dr. Domingo.

## 2022-06-21 NOTE — PROGRESS NOTE ADULT - ATTENDING COMMENTS
67yo man with PMH of CVA (2019) with subsequent seizure disorder, ESRD s/p renal transplant (04/2022), afib (on apixaban), CAD (s/p stents), CABG (2020), HTN, HLD, DMII who initially presented with status epilepticus from [facility]. He is on Keppra 250mg BId and VPA 1000mg BID.   Pt now on the floors, per medicine team pt having fluctuating mental status. RRT and code stroke called this morning for right sided weakness and confusion. Pt awake but not fully oriented, tremors of the arms and legs, rigid in the right arm and leg. Exam eventually improved and pt was able to answer more clearly. He was tachypneic and coughing. VPA level this AM corrected was 33, VBG during RRT showed elevated pCO2 of 106. CTH shows hypodensity in the white matter. Review of MRI brain shows extensive T2 white matter changes consistent with PRES. Pt's blood pressures have been running in the 150-160s systolic. Blood pressure in this range can cause PRES, especially in someone with renal dysfunction. Tacrolimus is also associated with PRES.     Pt given load of VPA 150mg IV this morning for subtherapeutic level. Increased maintenance to 750mg TID  Discussed PRES with team, recommending blood pressure control, goal 120-130s systolic.   Discussed with Dr. Tena regarding tacrolimus and willing to discontinue but will d/w nephrology.  would also address hypercarbia as this may also contribute to AMS. CXR showed rounded opacity in right midlung concerning for loculated pleural fluid or pneumonia. 65yo man with PMH of CVA (2019) with subsequent seizure disorder, ESRD s/p renal transplant (04/2022), afib (on apixaban), CAD (s/p stents), CABG (2020), HTN, HLD, DMII who initially presented with status epilepticus from [facility]. He is on Keppra 250mg BId and VPA 1000mg BID.   Pt now on the floors, per medicine team pt having fluctuating mental status. RRT and code stroke called this morning for right sided weakness and confusion. Pt awake but not fully oriented, tremors of the arms and legs, rigid in the right arm and leg. Exam eventually improved and pt was able to answer more clearly. He was tachypneic and coughing. VPA level this AM corrected was 33, VBG during RRT showed elevated pCO2 of 106. CTH shows hypodensity in the white matter. Review of MRI brain shows extensive T2 white matter changes consistent with PRES. Pt's blood pressures have been running in the 150-160s systolic. Blood pressure in this range can cause PRES, especially in someone with renal dysfunction. Tacrolimus is also associated with PRES.     Pt given load of VPA 150mg IV this morning for subtherapeutic level. Increased maintenance to 750mg TID  Discussed PRES with team, recommending blood pressure control, goal 120-130s systolic.   Discussed with Dr. Tena regarding tacrolimus and willing to discontinue but will d/w nephrology.  CXR showed rounded opacity in right midlung concerning for loculated pleural fluid or pneumonia.

## 2022-06-21 NOTE — PROGRESS NOTE ADULT - SUBJECTIVE AND OBJECTIVE BOX
[facility] [place] [facility]   MD NINA MASON MD, PA KRISTINE SOLTANPOUR, DO INJUNG KO, NP    TEL:  OFFICE: [phone]    From 5pm-7am Answering Service [phone]    -- RENAL FOLLOW UP NOTE ---Date of Service 06-21-22 @ 13:03    Patient is a 66y old  Male who presents with a chief complaint of Status Epilepticus (21 Jun 2022 11:29)      Patient seen and examined at bedside.     VITALS:  T(F): 97.7 (06-21-22 @ 08:31), Max: 98.7 (06-21-22 @ 05:00)  HR: 93 (06-21-22 @ 08:31)  BP: 166/70 (06-21-22 @ 08:31)  RR: 18 (06-21-22 @ 08:31)  SpO2: 94% (06-21-22 @ 08:31)  Wt(kg): --    06-20 @ 07:01  -  06-21 @ 07:00  --------------------------------------------------------  IN: 1060 mL / OUT: 1300 mL / NET: -240 mL          PHYSICAL EXAM:  Constitutional: NAD  Neck: No JVD  Respiratory: CTAB, no wheezes, rales or rhonchi  Cardiovascular: S1, S2, RRR  Gastrointestinal: BS+, soft, NT/ND  Extremities: No peripheral edema    Hospital Medications:   MEDICATIONS  (STANDING):  atorvastatin 40 milliGRAM(s) Oral at bedtime  chlorhexidine 2% Cloths 1 Application(s) Topical <User Schedule>  dextrose 5%. 1000 milliLiter(s) (50 mL/Hr) IV Continuous <Continuous>  dextrose 50% Injectable 25 Gram(s) IV Push once  dextrose 50% Injectable 12.5 Gram(s) IV Push once  dextrose 50% Injectable 25 Gram(s) IV Push once  diVALproex DR 1000 milliGRAM(s) Oral two times a day  fluconAZOLE   Tablet 200 milliGRAM(s) Oral daily  insulin glargine Injectable (LANTUS) 7 Unit(s) SubCutaneous at bedtime  insulin lispro (ADMELOG) corrective regimen sliding scale   SubCutaneous three times a day before meals  insulin lispro (ADMELOG) corrective regimen sliding scale   SubCutaneous at bedtime  insulin lispro Injectable (ADMELOG) 5 Unit(s) SubCutaneous three times a day before meals  levETIRAcetam 250 milliGRAM(s) Oral two times a day  levothyroxine 50 MICROGram(s) Oral daily  magnesium oxide 400 milliGRAM(s) Oral every 8 hours  multivitamin 1 Tablet(s) Oral daily  mycophenolate mofetil 500 milliGRAM(s) Oral <User Schedule>  NIFEdipine XL 30 milliGRAM(s) Oral once  pantoprazole    Tablet 40 milliGRAM(s) Oral before breakfast  polyethylene glycol 3350 17 Gram(s) Oral daily  predniSONE   Tablet 5 milliGRAM(s) Oral daily  senna 2 Tablet(s) Oral at bedtime  sodium bicarbonate 1300 milliGRAM(s) Oral three times a day  valGANciclovir 450 milliGRAM(s) Oral <User Schedule>    Tacrolimus (), Serum: 4.6 ng/mL (06-21 @ 06:16)    LABS:  06-21    141  |  105  |  35<H>  ----------------------------<  134<H>  4.5   |  24  |  1.46<H>    Ca    8.1<L>      21 Jun 2022 08:58  Phos  2.4     06-21  Mg     1.9     06-21    TPro  5.7<L>  /  Alb  2.2<L>  /  TBili  0.3  /  DBili      /  AST  20  /  ALT  11  /  AlkPhos  79  06-21    Creatinine Trend: 1.46 <--, 1.53 <--, 1.54 <--, 1.68 <--, 1.76 <--, 1.87 <--, 1.82 <--, 1.70 <--, 1.70 <--, 1.75 <--, 1.68 <--, 1.66 <--, 1.83 <--, 1.98 <--    Albumin, Serum: 2.2 g/dL (06-21 @ 08:58)  Phosphorus Level, Serum: 2.4 mg/dL (06-21 @ 08:58)  Albumin, Serum: 2.6 g/dL (06-21 @ 06:16)  Phosphorus Level, Serum: 2.6 mg/dL (06-21 @ 06:16)                              7.6    3.78  )-----------( 235      ( 21 Jun 2022 08:58 )             23.8     Urine Studies:  Urinalysis - [06-10-22 @ 22:28]      Color Light Yellow / Appearance Clear / SG 1.016 / pH 6.0      Gluc 100 mg/dL / Ketone Negative  / Bili Negative / Urobili Negative       Blood Small / Protein Trace / Leuk Est Negative / Nitrite Negative      RBC 25 / WBC 5 / Hyaline  / Gran  / Sq Epi  / Non Sq Epi 1 / Bacteria Negative      Iron 17, TIBC 171, %sat 10      [05-02-22 @ 13:03]  Ferritin 1505      [05-02-22 @ 13:05]  PTH -- (Ca 9.2)      [02-05-22 @ 10:13]   294  HbA1c 6.0      [02-21-20 @ 08:53]  Lipid: chol 118, TG 54, HDL 59, LDL --      [06-27-21 @ 09:44]        RADIOLOGY & ADDITIONAL STUDIES:

## 2022-06-21 NOTE — PROGRESS NOTE ADULT - PROBLEM SELECTOR PLAN 2
S/p Thymo induction. Stop CNI as MRI concerning for PRES and increased seizure activity. Will start on Belatacept. C/w prednisone 5mg daily. C/w half-dose cellcept for now, will need uptitration. C/w Valcyte, and fluconazole 200mg daily (on fluconazole after repair of arterial anastomosis) for prophylaxis. D/c bactrim for hyperkalemia. Glucose now better controlled. Consider resuming 6/22.      If you have any questions, please feel free to contact me  Ant Pantoja  Nephrology Fellow  [phone]; Prefer [employer] TEAMS  (After 5pm or on weekends please page the on-call fellow).

## 2022-06-21 NOTE — PROGRESS NOTE ADULT - SUBJECTIVE AND OBJECTIVE BOX
Transplant Surgery Multidisciplinary Progress Note   --------------------------------------------------------------  R DCD DDRT    4/21/22    Present: Patient seen and examined with multidisciplinary team including Transplant Surgeon: Dr. Tena,  Nephrologist: Dr. Mary Lomeli, Pharmacist: WALDO Lim and unit RN during am rounds.  Disciplines not in attendance will be notified of the plan.     HPI: 66M with PMH: DM type II, HTN, CAD s/p CABG in 2020, AFib on Eliquis, CVA in 2019 due to Afib, Seizure d/o following CVA, last episode was on 4/8/22, h/o GIB in 2/2022 EGD with duodenal ulcer.  ESRD due to DM was on HD since 2019.     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  Donor was 58 , KDPI 82%, DCD, single vessels and ureter, HLA mismatch 1, 2, 2. No DSA, cPRA 0%. 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 Re-admitted in May for anemia in the setting of large perinephric hematoma s/p evacuation and repair of arterial anastomosis on 5/2/22. Intra operative biopsy showed no rejection, Creatinine ranging ~2mg/dL.    In Rehab: He was recently dx with klebsiella UTI rx with ertapenem - then switched to Levaquin day #6.    He also had parainfluenza pneumonia. Was at rehab progressing well.      Hospital course:  - 6/10: transferred from rehab facility for seizure status epilepticus. Intubated for respiratory protection and transferred to MICU.  EEG showed no seizure activity. Neuro consulted.   - 6/11: Extubated. Found to have R pleural effusion. s/p Thoracentesis 1.3L. Eliquis changed to heparin drip.  Post procedure drop in H&H. 5u PRBC, 2 u FFP.    - 6/11 evening: Transferred to SICU for further care in setting of hypoxia, significant R pleural effusion, hgb 6 and hemodynamic instability  - 6/11 Intubated at 9pm and sedated on Precedex.  CT placed, 600ml blood drained.  1L total overnight, started pressors  - 6/11 Received Protamine for PTT >100 (was on Heparin drip started for AF), 2 u FFP and 5u PRBC total  - 6/13 s/p VATS 2.2L old blood removed; second chest tube placed  - 6/18-19: chest tubes removed    Interval Events:  - Afebrile, VSS  - Disoriented this am with R sided weakness (more pronounced today), head imaging c/w PRES  - FK discontinued, plan to start Belatacept  - ? seizure activity in the afternoon, noted by son and RN at bedside. Ativan 1mg x 1 dose, EEG re-ordered     Immunosuppression:   Induction:  Thymoglobulin                                        Maintenance immunosuppression: FK per level , MMF 500mg BID, Pred 5  Ongoing monitoring for signs of rejection.    Potential Discharge date: pending clinical improvement  Education:  Medications  Plan of care:  See Below    MEDICATIONS  (STANDING):  atorvastatin 40 milliGRAM(s) Oral at bedtime  chlorhexidine 2% Cloths 1 Application(s) Topical <User Schedule>  dextrose 5%. 1000 milliLiter(s) (50 mL/Hr) IV Continuous <Continuous>  dextrose 50% Injectable 25 Gram(s) IV Push once  dextrose 50% Injectable 12.5 Gram(s) IV Push once  dextrose 50% Injectable 25 Gram(s) IV Push once  diVALproex DR 1000 milliGRAM(s) Oral two times a day  fluconAZOLE   Tablet 200 milliGRAM(s) Oral daily  insulin glargine Injectable (LANTUS) 5 Unit(s) SubCutaneous at bedtime  insulin lispro (ADMELOG) corrective regimen sliding scale   SubCutaneous three times a day before meals  insulin lispro (ADMELOG) corrective regimen sliding scale   SubCutaneous at bedtime  insulin lispro Injectable (ADMELOG) 5 Unit(s) SubCutaneous three times a day before meals  levETIRAcetam 250 milliGRAM(s) Oral two times a day  levothyroxine 50 MICROGram(s) Oral daily  LORazepam   Injectable 1 milliGRAM(s) IV Push once  magnesium oxide 400 milliGRAM(s) Oral every 8 hours  multivitamin 1 Tablet(s) Oral daily  mycophenolate mofetil 500 milliGRAM(s) Oral <User Schedule>  pantoprazole    Tablet 40 milliGRAM(s) Oral before breakfast  polyethylene glycol 3350 17 Gram(s) Oral daily  predniSONE   Tablet 5 milliGRAM(s) Oral daily  senna 2 Tablet(s) Oral at bedtime  sodium bicarbonate 1300 milliGRAM(s) Oral three times a day  valGANciclovir 450 milliGRAM(s) Oral <User Schedule>    MEDICATIONS  (PRN):  acetaminophen     Tablet .. 975 milliGRAM(s) Oral every 6 hours PRN Mild Pain (1 - 3)  dextrose Oral Gel 15 Gram(s) Oral once PRN Blood Glucose LESS THAN 70 milliGRAM(s)/deciliter    PAST MEDICAL & SURGICAL HISTORY:  Hypertension  Diabetes  Dyslipidemia  CAD (Coronary Artery Disease)  with Stents in 06/2009 and 6/2019, s/p off-pump C3L on 8/13/20  Hypothyroidism  CVA (cerebral vascular accident)  12/13/19 with residual bilateral weakness  Anemia  PEG (percutaneous endoscopic gastrostomy) status  removed July 2020  Intubation of airway performed without difficulty  Dec 2019  CVA c/b status epilepticus requiring intubation and PEG in 12/2019-  ESRD on dialysis  M/W/F  Seizures  after CVA, last seizure was last week 4/07/22  History of insertion of stent into coronary artery bypass graft  2009 and 2019  S/P CABG x 3  off pump C3L on 8/13/20      Vital Signs Last 24 Hrs  T(C): 36.8 (21 Jun 2022 16:26), Max: 37.1 (21 Jun 2022 05:00)  T(F): 98.2 (21 Jun 2022 16:26), Max: 98.7 (21 Jun 2022 05:00)  HR: 73 (21 Jun 2022 17:08) (73 - 93)  BP: 151/68 (21 Jun 2022 17:08) (133/77 - 166/70)  BP(mean): --  RR: 18 (21 Jun 2022 17:08) (18 - 18)  SpO2: 96% (21 Jun 2022 17:08) (91% - 99%)    I&O's Summary    20 Jun 2022 07:01  -  21 Jun 2022 07:00  --------------------------------------------------------  IN: 1060 mL / OUT: 1300 mL / NET: -240 mL                         7.6    3.78  )-----------( 235      ( 21 Jun 2022 08:58 )             23.8     06-21    141  |  105  |  35<H>  ----------------------------<  134<H>  4.5   |  24  |  1.46<H>    Ca    8.1<L>      21 Jun 2022 08:58  Phos  2.4     06-21  Mg     1.9     06-21    TPro  5.7<L>  /  Alb  2.2<L>  /  TBili  0.3  /  DBili  x   /  AST  20  /  ALT  11  /  AlkPhos  79  06-21    Tacrolimus (), Serum: 4.6 ng/mL (06-21 @ 06:16)    Culture - Blood (collected 06-19-22 @ 06:19)  Source: .Blood Blood  Preliminary Report (06-20-22 @ 09:00):    No growth to date.      REVIEW OF SYSTEMS  --------------------------------------------------------------------------------  Gen: No weight changes, fatigue, fevers/chills, weakness  Skin: No rashes  Head/Eyes/Ears/Mouth: No headache; Normal hearing; Normal vision w/o blurriness; No sinus pain/discomfort, sore throat  Respiratory: No dyspnea, cough, wheezing, hemoptysis. Some pain at CT sites  CV: No chest pain, PND, orthopnea  GI: No abdominal pain, diarrhea, constipation, nausea, vomiting, melena, hematochezia  : No increased frequency, dysuria, hematuria, nocturia  MSK: No joint pain/swelling; no back pain; no edema  Neuro: No dizziness/lightheadedness, weakness, seizures, numbness, tingling  Heme: No easy bruising or bleeding  Endo: No heat/cold intolerance  Psych: No significant nervousness, anxiety, stress, depression  All other systems were reviewed and are negative, except as noted.    PHYSICAL EXAM:  Constitutional: Well developed / well nourished  Eyes: Anicteric, PERRLA  ENMT: nc/at  Neck: supple  Respiratory: CTA anterior. chest tubes removed, previous site with clean dressing  Cardiovascular: RRR  Gastrointestinal: Soft, non distended, NT, incision healed   Genitourinary: voiding spontaneously  Extremities: SCD's in place and working bilaterally, no edema  Vascular: Palpable dp pulses bilaterally  Neurological: A&O x3  Skin: no rashes, ulcerations or lesions  Musculoskeletal: Moving all extremities  Psychiatric: Responsive

## 2022-06-21 NOTE — PROGRESS NOTE ADULT - SUBJECTIVE AND OBJECTIVE BOX
Neurology Progress Note  Jeb Recinos, PGY-2     SUBJECTIVE/OBJECTIVE/INTERVAL EVENTS: Patient seen and examined at bedside w/ neuro attending Dr Domingo and neuro team during RRT and code stroke which was ended. Patient per primary team had waxing/waning mental status but were concerned he wasn't moving his R arm as much as other extremities and ?R gaze or head deviation. Evaluated patient and he was encephalopathic, following some simple commands, no fixed gaze deviation, had increased tone in RUE than LUE likely causing less movement of RUE. RUE with pronounced tremor activity. Intact sensation x4. Discussed w/ neuro attending and ended code as low suspicion for stroke and more likely concerning for seizure.     VITALS & EXAMINATION:  Vital Signs Last 24 Hrs  T(C): 37.1 (21 Jun 2022 05:00), Max: 37.1 (21 Jun 2022 05:00)  T(F): 98.7 (21 Jun 2022 05:00), Max: 98.7 (21 Jun 2022 05:00)  HR: 85 (21 Jun 2022 05:00) (74 - 85)  BP: 160/63 (21 Jun 2022 05:00) (139/68 - 160/63)  BP(mean): --  RR: 18 (21 Jun 2022 05:00) (18 - 18)  SpO2: 94% (21 Jun 2022 05:00) (94% - 99%)    Physical Examination:  General: Laying in bed, mildly underweight, encephalopathic appearing  Head: normocephalic  Eyes: clear sclera  Neck: no stiff neck  Respiratory: On NC, in no acute respiratory distress  Skin: well perfused    Neurologic:  - Mental Status: Awake with eyes open but lethargic, not able to test orientation   - Speech is mildly hypophonic and slowed with intermittent pauses. Cannot assess fluency. Appears intact comprehension; Follows few simple commands although occasionally requires repetition to complete task.  - Cranial Nerves: PERRL w/ no sluggishness, no fixed gaze, VFF to confrontation b/l. EOMI w/o nystagmus. No ezio facial asymmetry. Cannot assess rest of CN due to clinical status  - Motor: Increased extremity tone RUE > LUE and also increased moderately in b/l LE. Has drift of RUE but in setting of increased tone and  moderate pronounced tremor.    - Reflexes: 2+ and symmetric at the biceps, 3+ knees L>R. Toes downgoing R mute L.   - Sensory: Intact throughout to noxious stimuli  - Coordination: cannot assess  - Gait: cannot assess    LABORATORY:  CBC                       7.6    3.78  )-----------( 235      ( 21 Jun 2022 08:58 )             23.8     Chem 06-21    141  |  105  |  35<H>  ----------------------------<  134<H>  4.5   |  24  |  1.46<H>    Ca    8.1<L>      21 Jun 2022 08:58  Phos  2.4     06-21  Mg     1.9     06-21    TPro  5.7<L>  /  Alb  2.2<L>  /  TBili  0.3  /  DBili  x   /  AST  20  /  ALT  11  /  AlkPhos  79  06-21    LFTs LIVER FUNCTIONS - ( 21 Jun 2022 08:58 )  Alb: 2.2 g/dL / Pro: 5.7 g/dL / ALK PHOS: 79 U/L / ALT: 11 U/L / AST: 20 U/L / GGT: x           Coagulopathy PT/INR - ( 21 Jun 2022 06:16 )   PT: 14.7 sec;   INR: 1.28 ratio         PTT - ( 21 Jun 2022 06:16 )  PTT:33.6 sec  Lipid Panel   A1c   Cardiac enzymes CARDIAC MARKERS ( 21 Jun 2022 08:58 )  x     / x     / 28 U/L / x     / x        U/A   CSF  Immunological  Other    STUDIES & IMAGING: (EEG, CT, MR, U/S, TTE/DINAH):    < from: CT Head No Cont (06.21.22 @ 09:04) >    ACC: [id number] EXAM:  CT BRAIN                          PROCEDURE DATE:  06/21/2022          INTERPRETATION:  Clinical Indication: Right-sided weakness    5mm axial sections of the brain were obtained from base to vertex,   without the intravenous administration of contrast material. Coronal and   sagittal computer generated reconstructed views are available.    Comparison is made with prior MRI of 6/20/2022 and brain CT 6/10/2022.    Moderate atrophy is identified with ventricular and sulcal prominence.   Extensive small vessel white matter ischemic changes are noted. Old   bilateral frontal and parietal cortical infarcts are identified. Old   bilateral lacunar infarcts in the basal ganglia are identified. There is   no hemorrhage. There isno mass or midline shift. There has been previous   bilateral lens replacement surgery        IMPRESSION: Moderate atrophy. Extensive small vessel white matter   ischemic changes. Old bilateral frontal and parietal cortical infarcts.   No hemorrhage.No change since 6/10/2022.    Dr. Gomes discussed these findings with Dr. Tena on 6/21/2022 9:43 AM   with read back.    --- End of Report ---    TIMOTHY GOMES MD; Attending Radiologist  This document has been electronically signed. Jun 21 2022  9:46AM    < end of copied text >      < from: MR Head No Cont (06.20.22 @ 18:35) >    ACC: [id number] EXAM:  MR BRAIN                          PROCEDURE DATE:  06/20/2022          INTERPRETATION:  CLINICAL INFORMATION: History of renal transplant   (4/21/2022). Seizure disorder. Altered mental status.    TECHNIQUE: Multiplanar multisequence MRI of the brain was performed   without contrast. Sagittal and axial T1, axial T2, FLAIR, SWI,   diffusion-weighted images and an ADC map were obtained.    COMPARISON: Brain MR from 6/29/2021. Head CT from 4/6/2022.    FINDINGS:    Partially motion degraded exam.    Redemonstration of multiple areas of encephalomalacia/gliosis and   cortical necrosis in bilateral frontal and parietal lobes compatible with   multiple chronic infarcts. Interval increase of confluent patchy high   T2/FLAIR areas in bilateral centrum semiovale which are nonspecific but   likely related to sequelae of microvascular disease.    There is no intraparenchymal hematoma, mass effect or midline shift.   Small focus of susceptibility in the right cerebellum likely old   hemorrhage.    There is no diffusion abnormality to suggest acute or subacute   infarction. Major flow-voids at the base of the brain follow expected   course and contour.    No abnormal extra-axial fluid collections are present.    The calvarium is intact. The visualized intraorbital compartments,   paranasal sinuses and tympanomastoid cavities are grossly unremarkable.    IMPRESSION:    There is no evidence of acute infarct, extra-axial fluid collection, mass   or midline shift.    Multiple areas of encephalomalacia/gliosis in bilateral frontal and the   parietal regions are again noted.    Interval progression of patchy confluent high T2/FLAIR signal in   bilateral centrum semiovale since 6/29/2021 exam which may represent   progression of microvascular disease and/or reversible encephalopathy   syndrome (PRES) given patient's recent history of renal transplant.    --- End of Report ---    PAVLOPAUL MISHYN MD; Resident Radiology  This document has been electronically signed.  TOMI MONCADA MD; Attending Radiologist  This document has been electronically signed. Jun 21 2022  8:52AM    < end of copied text >

## 2022-06-21 NOTE — PROGRESS NOTE ADULT - ATTENDING COMMENTS
Allograft function is stable  MRI head revealed PRES. will d/c tacrolimus and switch to Belatacept.   continue  Cellcept 500 mg po daily and Prednisone 5 mg po daily

## 2022-06-22 NOTE — PROGRESS NOTE ADULT - ASSESSMENT
66 yr old man with h/o DM type II, HTN, CAD s/p CABG in 2020, Afib on Eliquis, CVA in 2019 due to Afib, Seizure d/o (last episode was on 4/8/22), h/o GI bleed in 2/2022 EGD with duodenal ulcer and ESRD on HD s/p R DDRT from DCD donor on 4/21/22 complicated by DGF requiring HD until 4/29/22 now with good graft function who presented with status epilepticus in setting of UTI/antibiotics and sub-therapeutic valproic acid level     Neuro:  - Imaging concerning for PRES: FK discontinued; plan to start Belatacept on Thursday  - Keep SBP < 150, Incr Nifedipine 60mg daily  - EEG ordered    - On Keppra and Valproic acid, adjusted by Neuro  - Keep Mag > 2    R DCD DDRT (ureter stented) 4/21/22  - stable renal function   - Strict I/Os  - Regular diet  - will attempt to remove stent this admission    Immunosuppression:   - plan to start Belatacept Thursday. MMF 500mg bid, Pred 5  -PPX: Valcyte (MWF), Fluconazole 200mg daily (on fluconazole after repair of arterial anastomosis).  Hold Bactrim for hyperkalemia.    R Pleural effusion  -resolved, chest tubes removed  - CXR with R midlung opacity c/f PNA; resume eratpenem 1g x7 d    DM  - on Lantus/Lispro, Endocrine following     A fib:  -Resume Eliquis 5mg bid     HTN:  -cont Nifedipine    Dispo  -PT following

## 2022-06-22 NOTE — PROGRESS NOTE ADULT - ATTENDING COMMENTS
Allograft function is stable  MRI head revealed PRES. will d/c tacrolimus and switch to Belatacept.   continue  Cellcept 500 mg po daily and Prednisone 5 mg po daily Allograft function is stable. MRI head revealed PRES. Tacrolimus was d/c  Will start Belatacept tomorrow  Continue Cellcept 500 mg po daily and Prednisone 5 mg po daily

## 2022-06-22 NOTE — PROGRESS NOTE ADULT - ASSESSMENT
66 yr old Male with PMHx ESRD s/p permacath removal 5/10/22 s/p Rt DDRT 4/21/22,  CVA (2019), Afib on apixaban, seizure activity, CAD s/p stents, CABG (2020), HTN, HLD, DM on insulin, gastric/duodenal ulcer, recent hospitalization 4/28/22 -5/10/22 with weakness/anemia found to have perinephric hematoma requiring evacuation and repair of bleeding arterial anastomosis. Curently being treated for UTI with Levaquin Today after developing multiple sz episodes refractory to 5 mg versed IM (EMS) and 2 mg ativan IV in E.D. concerning for status epilepticus requiring intubation for hypoxic respiratory  failure. MICU Consult was called for hypoxic respiratory failure secondary to status epilepticus.  Nephrology consulted for renal failure.       A/P    DDRT on 04/21/22  Course complicated by DGF, was on HD until 4/29/22. Readmitted in May for anemia in the setting of large perinephric hematoma s/p evacuation and repair of arterial anastomosis on 5/2/22. Intra operative biopsy showed no rejection.  ANA likely sec to  hemodynamic change   scr rising today   optimize glucose   monitor BMP, U/O     HTN   optimal   monitor BP closely     Immunosuppresssion  continue per transplant team

## 2022-06-22 NOTE — PROGRESS NOTE ADULT - SUBJECTIVE AND OBJECTIVE BOX
Transplant Surgery Multidisciplinary Progress Note   --------------------------------------------------------------  R DCD DDRT    4/21/22    Present: Patient seen and examined with multidisciplinary team including Transplant Surgeon: Dr. Tena,  Nephrologist: Dr. Mary Lomeli, Pharmacist: WALDO Lim and unit RN during am rounds.  Disciplines not in attendance will be notified of the plan.     HPI: 66M with PMH: DM type II, HTN, CAD s/p CABG in 2020, AFib on Eliquis, CVA in 2019 due to Afib, Seizure d/o following CVA, last episode was on 4/8/22, h/o GIB in 2/2022 EGD with duodenal ulcer.  ESRD due to DM was on HD since 2019.     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  Donor was 58 , KDPI 82%, DCD, single vessels and ureter, HLA mismatch 1, 2, 2. No DSA, cPRA 0%. 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 Re-admitted in May for anemia in the setting of large perinephric hematoma s/p evacuation and repair of arterial anastomosis on 5/2/22. Intra operative biopsy showed no rejection, Creatinine ranging ~2mg/dL.    In Rehab: He was recently dx with klebsiella UTI rx with ertapenem - then switched to Levaquin day #6.    He also had parainfluenza pneumonia. Was at rehab progressing well.      Hospital course:  - 6/10: transferred from rehab facility for seizure status epilepticus. Intubated for respiratory protection and transferred to MICU.  EEG showed no seizure activity. Neuro consulted.   - 6/11: Extubated. Found to have R pleural effusion. s/p Thoracentesis 1.3L. Eliquis changed to heparin drip.  Post procedure drop in H&H. 5u PRBC, 2 u FFP.    - 6/11 evening: Transferred to SICU for further care in setting of hypoxia, significant R pleural effusion, hgb 6 and hemodynamic instability  - 6/11 Intubated at 9pm and sedated on Precedex.  CT placed, 600ml blood drained.  1L total overnight, started pressors  - 6/11 Received Protamine for PTT >100 (was on Heparin drip started for AF), 2 u FFP and 5u PRBC total  - 6/13 s/p VATS 2.2L old blood removed; second chest tube placed  - 6/18-19: chest tubes removed    Interval Events:  - Afebrile, VSS  - Imaging yest c/f PRES; discontinued FK  - Increased Nifedipine 60mg daily   - Awaiting Continuous EEG   - CXR wtih R midlung opacity c/f pna - started Ertapenem   - Mental status better this am, following commands.     Immunosuppression:   Induction:  Thymoglobulin                                        Maintenance immunosuppression: Belatacept, MMF 500mg BID, Pred 5  Ongoing monitoring for signs of rejection.    Potential Discharge date: pending clinical improvement  Education:  Medications  Plan of care:  See Below    MEDICATIONS  (STANDING):  apixaban 5 milliGRAM(s) Oral every 12 hours  atorvastatin 40 milliGRAM(s) Oral at bedtime  chlorhexidine 2% Cloths 1 Application(s) Topical <User Schedule>  dextrose 5%. 1000 milliLiter(s) (50 mL/Hr) IV Continuous <Continuous>  dextrose 50% Injectable 25 Gram(s) IV Push once  dextrose 50% Injectable 25 Gram(s) IV Push once  dextrose 50% Injectable 12.5 Gram(s) IV Push once  diVALproex DR 1000 milliGRAM(s) Oral two times a day  ertapenem  IVPB      insulin glargine Injectable (LANTUS) 5 Unit(s) SubCutaneous at bedtime  insulin lispro (ADMELOG) corrective regimen sliding scale   SubCutaneous three times a day before meals  insulin lispro (ADMELOG) corrective regimen sliding scale   SubCutaneous at bedtime  insulin lispro Injectable (ADMELOG) 5 Unit(s) SubCutaneous three times a day before meals  levETIRAcetam 250 milliGRAM(s) Oral two times a day  levothyroxine 50 MICROGram(s) Oral daily  magnesium oxide 400 milliGRAM(s) Oral every 8 hours  multivitamin 1 Tablet(s) Oral daily  mycophenolate mofetil 500 milliGRAM(s) Oral <User Schedule>  NIFEdipine XL 60 milliGRAM(s) Oral daily  nystatin    Suspension 639208 Unit(s) Oral four times a day  pantoprazole    Tablet 40 milliGRAM(s) Oral before breakfast  polyethylene glycol 3350 17 Gram(s) Oral daily  predniSONE   Tablet 5 milliGRAM(s) Oral daily  senna 2 Tablet(s) Oral at bedtime  sodium bicarbonate 1300 milliGRAM(s) Oral three times a day  trimethoprim   80 mG/sulfamethoxazole 400 mG 1 Tablet(s) Oral <User Schedule>  valGANciclovir 450 milliGRAM(s) Oral <User Schedule>    MEDICATIONS  (PRN):  acetaminophen     Tablet .. 975 milliGRAM(s) Oral every 6 hours PRN Mild Pain (1 - 3)  dextrose Oral Gel 15 Gram(s) Oral once PRN Blood Glucose LESS THAN 70 milliGRAM(s)/deciliter    PAST MEDICAL & SURGICAL HISTORY:  Hypertension  Diabetes  Dyslipidemia  CAD (Coronary Artery Disease) with Stents in 06/2009 and 6/2019, s/p off-pump C3L on 8/13/20  Hypothyroidism  CVA (cerebral vascular accident) 12/13/19 with residual bilateral weakness  Anemia  PEG (percutaneous endoscopic gastrostomy) status removed July 2020  Intubation of airway performed without difficulty Dec 2019  CVA c/b status epilepticus requiring intubation and PEG in 12/2019-  ESRD on dialysis M/W/F  Seizures after CVA, last seizure was last week 4/07/22  History of insertion of stent into coronary artery bypass graft 2009 and 2019  S/P CABG x 3 off pump C3L on 8/13/20    Vital Signs Last 24 Hrs  T(C): 36.6 (22 Jun 2022 09:07), Max: 36.8 (21 Jun 2022 16:26)  T(F): 97.8 (22 Jun 2022 09:07), Max: 98.2 (21 Jun 2022 16:26)  HR: 80 (22 Jun 2022 09:07) (73 - 82)  BP: 122/61 (22 Jun 2022 09:07) (122/61 - 157/65)  BP(mean): --  RR: 18 (22 Jun 2022 09:07) (18 - 18)  SpO2: 96% (22 Jun 2022 09:07) (91% - 96%)    I&O's Summary    21 Jun 2022 07:01  -  22 Jun 2022 07:00  --------------------------------------------------------  IN: 360 mL / OUT: 300 mL / NET: 60 mL                        7.5    3.59  )-----------( 231      ( 22 Jun 2022 06:05 )             24.4     06-22    145  |  107  |  38<H>  ----------------------------<  167<H>  4.6   |  22  |  1.75<H>    Ca    9.1      22 Jun 2022 06:05  Phos  3.0     06-22  Mg     2.0     06-22    TPro  6.6  /  Alb  2.7<L>  /  TBili  0.4  /  DBili  x   /  AST  23  /  ALT  12  /  AlkPhos  87  06-22    Tacrolimus (), Serum: 4.6 ng/mL (06-21 @ 06:16)        Culture - Blood (collected 06-19-22 @ 06:19)  Source: .Blood Blood  Preliminary Report (06-20-22 @ 09:00):    No growth to date.      REVIEW OF SYSTEMS  --------------------------------------------------------------------------------  Gen: No weight changes, fatigue, fevers/chills, weakness  Skin: No rashes  Head/Eyes/Ears/Mouth: No headache; Normal hearing; Normal vision w/o blurriness; No sinus pain/discomfort, sore throat  Respiratory: No dyspnea, cough, wheezing, hemoptysis. Some pain at CT sites  CV: No chest pain, PND, orthopnea  GI: No abdominal pain, diarrhea, constipation, nausea, vomiting, melena, hematochezia  : No increased frequency, dysuria, hematuria, nocturia  MSK: No joint pain/swelling; no back pain; no edema  Neuro: No dizziness/lightheadedness, weakness, seizures, numbness, tingling  Heme: No easy bruising or bleeding  Endo: No heat/cold intolerance  Psych: No significant nervousness, anxiety, stress, depression  All other systems were reviewed and are negative, except as noted.    PHYSICAL EXAM:  Constitutional: Well developed / well nourished  Eyes: Anicteric, PERRLA  ENMT: nc/at  Neck: supple  Respiratory: CTA anterior. chest tubes removed, previous site with clean dressing  Cardiovascular: RRR  Gastrointestinal: Soft, non distended, NT, incision healed   Genitourinary: voiding spontaneously  Extremities: SCD's in place and working bilaterally, no edema  Vascular: Palpable dp pulses bilaterally  Neurological: A&O x3  Skin: no rashes, ulcerations or lesions  Musculoskeletal: Moving all extremities  Psychiatric: Responsive

## 2022-06-22 NOTE — PROGRESS NOTE ADULT - PROBLEM SELECTOR PLAN 1
S/p DDRT on 4/21/22 Cr. stable in 2 range. ANA in the setting of hemodynamic instability 2/2 ATN. Donor was 58 , KDPI 82%, DCD, single vessels and ureter, HLA mismatch 1, 2, 2. No DSA, cPRA 0%. 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 Readmitted in May for anemia in the setting of large perinephric hematoma s/p evacuation and repair of arterial anastomosis on 5/2/22. Intra operative biopsy showed no rejection. As outpatient SCr. in th low 2 range. Admitted with lowest documented Cr. of 1.8 since transplant which has trended to 1.7 today. S/p Ertapenem for UTI. Start Ertapenem for 7 days for PNA. F/u ID for reccs. Optimize hemodynamics. Adequate UOP. C/w NaHCO3 for acidosis and will help with kaliuresis. C/w Nifedipine 30mg Daily for hypertension.    On Depakote and Keppra for seizures. Depakote levels noted low this AM and dose increased.

## 2022-06-22 NOTE — PROGRESS NOTE ADULT - ATTENDING COMMENTS
67yo man with PMH of CVA (2019) with subsequent seizure disorder, ESRD s/p renal transplant (04/2022), afib (on apixaban), CAD (s/p stents), CABG (2020), HTN, HLD, DMII who initially presented with status epilepticus from [facility]. He is on Keppra 250mg BId and VPA 1000mg BID.   Pt now on the floors, per medicine team pt having fluctuating mental status.  CTH shows hypodensity in the white matter. Review of MRI brain shows extensive T2 white matter changes consistent with PRES.     Blood pressures improved, few reading still in the 160's systolic  tacrolimus discontinued  Switching from vimpat and tapering off VPA. Vimpat previously discontinued to insurance coverage issue.   STart Vimpat 100mg BID, will taper down VPA, decreasing down to 1g BID starting tonight.

## 2022-06-22 NOTE — PROGRESS NOTE ADULT - ASSESSMENT
65yo man with PMH of CVA (2019) with subsequent seizure disorder, ESRD s/p renal transplant (04/2022), afib (on apixaban), CAD (s/p stents), CABG (2020), HTN, HLD, DM presents to the ED with status epilepticus from [facility]. Semiology includes eyes deviated to the R, rhythmic R facial twitching as well as R shoulder clonic movements lasting approximately 30 seconds each. Of note patient is receiving antibiotics for Klebsiella UTI. Patient received Versed 5mg with EMS, Ativan 2mg and Keppra 1g load in the ED. Labs remarkable for VPA level 34. EEG from 04/2022 showed L frontocentral focal onset seizures. S/p intubation on 6/11. EEG negative for seizure but showed structural or functional abnormality in the left fronto-temporal region and moderate to severe nonspecific diffuse or multifocal cerebral dysfunction. RRT called 6/21 for decreased movement of RUE but with increased tone and tremor like activity/ clonus and diffuse increased tone and increased encephalopathy concerning for seizure. Had recent MRI day prior without signs of acute stroke. Code stroke ended w/ concern for seizure.    Impression: Status epilepticus likely provoked in the setting of infection, with possible decreased seizure threshold in the setting of known focal motor seizures. Change in mental status possibly due to hospital-induced delirium with toxic-metabolic encephalopathy. RRT on 6/21 concerning for seizure of L frontocentral focal onset with generalization. MRI with findings that may be suggestive of PRES, and given patient was on tacrolimus, it has been held.    Recommendations:  [x] BP goals of normotension if not contraindicated given concerns of possible PRES per MRI report.  [x] s/p EEG  [x] MR brain w/o contrast  [x] Telemetry monitoring  [x] On 6/21 s/p valproic acid load 1500mg IV x1, continue with keppra 250mg q12h (IV or PO) and valproic acid 1000mg BID PO (if not able to tolerate PO then switch to IV 750mg q8hrs). Was called that patient family may have approval for vimpat but discussed with neuro team and recommend to continue with keppra and valproic acid for now.  [ ] Recheck valproic acid levels tomorrow AM w/ CMP  [x] CBC, CMP, VBG w/ lactate, CK   [] Neuro checks and vital signs per unit protocol  [] Seizure/fall/aspiration precautions  [] Advise against driving, operating heavy machinery, water sports/bathing, activity in elevation without appropriate safety precautions    Case seen and discussed with neurology attending Dr. Domingo. 65yo man with PMH of CVA (2019) with subsequent seizure disorder, ESRD s/p renal transplant (04/2022), afib (on apixaban), CAD (s/p stents), CABG (2020), HTN, HLD, DM presents to the ED with status epilepticus from [facility]. Semiology includes eyes deviated to the R, rhythmic R facial twitching as well as R shoulder clonic movements lasting approximately 30 seconds each. Of note patient is receiving antibiotics for Klebsiella UTI. Patient received Versed 5mg with EMS, Ativan 2mg and Keppra 1g load in the ED. Labs remarkable for VPA level 34. EEG from 04/2022 showed L frontocentral focal onset seizures. S/p intubation on 6/11. EEG negative for seizure but showed structural or functional abnormality in the left fronto-temporal region and moderate to severe nonspecific diffuse or multifocal cerebral dysfunction. RRT called 6/21 for decreased movement of RUE but with increased tone and tremor like activity/ clonus and diffuse increased tone and increased encephalopathy concerning for seizure. Had recent MRI day prior without signs of acute stroke. Code stroke ended w/ concern for seizure.    Impression: Status epilepticus likely provoked in the setting of infection, with possible decreased seizure threshold in the setting of known focal motor seizures. Change in mental status possibly due to hospital-induced delirium with toxic-metabolic encephalopathy. RRT on 6/21 concerning for seizure of L frontocentral focal onset with generalization. MRI with findings that may be suggestive of PRES, and given patient was on tacrolimus, it has been held.    Recommendations:  [x] BP goals of normotension if not contraindicated given concerns of possible PRES per MRI report.  [x] s/p EEG  [x] MR brain w/o contrast  [x] Telemetry monitoring  [x] On keppra 250mg q12h (IV or PO) and valproic acid 1000mg BID PO (if not able to tolerate PO then switch to IV 750mg q8hrs). Primary team discussed w/ neuro attending and plan to start vimpat 100mg BID and taper valproic acid off per attending attestation.  [ ] Recheck valproic acid levels tomorrow AM w/ CMP  [x] CBC, CMP, VBG w/ lactate, CK   [] Neuro checks and vital signs per unit protocol  [] Seizure/fall/aspiration precautions  [] Advise against driving, operating heavy machinery, water sports/bathing, activity in elevation without appropriate safety precautions    Case seen and discussed with neurology attending Dr. Domingo.

## 2022-06-22 NOTE — PROGRESS NOTE ADULT - PROBLEM SELECTOR PLAN 2
S/p Thymo induction. Stop CNI as MRI concerning for PRES and increased seizure activity. Will start on Belatacept. C/w prednisone 5mg daily. C/w half-dose Cellcept for now, will need uptitration. C/w Valcyte. Discontinue Fluconazole and start Nystatin. Resume bactrim as hyperkalemia has resolved. Glucose now better controlled.     If you have any questions, please feel free to contact me  Ant Pantoja  Nephrology Fellow  [phone]; Prefer [employer] TEAMS  (After 5pm or on weekends please page the on-call fellow).

## 2022-06-22 NOTE — PROGRESS NOTE ADULT - SUBJECTIVE AND OBJECTIVE BOX
[facility]   MD NINA MASON MD, PA KRISTINE SOLTANPOUR, DO INJUNG KO, NP    TEL:  OFFICE: [phone]    From 5pm-7am Answering Service [phone]    -- RENAL FOLLOW UP NOTE ---Date of Service 06-22-22 @ 13:35    Patient is a 66y old  Male who presents with a chief complaint of Status Epilepticus (22 Jun 2022 13:17)      Patient seen and examined at bedside.     VITALS:  T(F): 98.1 (06-22-22 @ 13:00), Max: 98.2 (06-21-22 @ 16:26)  HR: 75 (06-22-22 @ 13:00)  BP: 119/53 (06-22-22 @ 13:00)  RR: 18 (06-22-22 @ 13:00)  SpO2: 97% (06-22-22 @ 13:00)  Wt(kg): --    06-21 @ 07:01  -  06-22 @ 07:00  --------------------------------------------------------  IN: 360 mL / OUT: 300 mL / NET: 60 mL          PHYSICAL EXAM:  Constitutional: NAD  Neck: No JVD  Respiratory: CTAB, no wheezes, rales or rhonchi  Cardiovascular: S1, S2, RRR  Gastrointestinal: BS+, soft, NT/ND  Extremities: No peripheral edema    Hospital Medications:   MEDICATIONS  (STANDING):  apixaban 5 milliGRAM(s) Oral every 12 hours  atorvastatin 40 milliGRAM(s) Oral at bedtime  chlorhexidine 2% Cloths 1 Application(s) Topical <User Schedule>  dextrose 5%. 1000 milliLiter(s) (50 mL/Hr) IV Continuous <Continuous>  dextrose 50% Injectable 25 Gram(s) IV Push once  dextrose 50% Injectable 12.5 Gram(s) IV Push once  dextrose 50% Injectable 25 Gram(s) IV Push once  diVALproex DR 1000 milliGRAM(s) Oral two times a day  ertapenem  IVPB      insulin glargine Injectable (LANTUS) 5 Unit(s) SubCutaneous at bedtime  insulin lispro (ADMELOG) corrective regimen sliding scale   SubCutaneous three times a day before meals  insulin lispro (ADMELOG) corrective regimen sliding scale   SubCutaneous at bedtime  insulin lispro Injectable (ADMELOG) 5 Unit(s) SubCutaneous three times a day before meals  levETIRAcetam 250 milliGRAM(s) Oral two times a day  levothyroxine 50 MICROGram(s) Oral daily  magnesium oxide 400 milliGRAM(s) Oral every 8 hours  multivitamin 1 Tablet(s) Oral daily  mycophenolate mofetil 500 milliGRAM(s) Oral <User Schedule>  NIFEdipine XL 60 milliGRAM(s) Oral daily  nystatin    Suspension 372084 Unit(s) Oral four times a day  pantoprazole    Tablet 40 milliGRAM(s) Oral before breakfast  polyethylene glycol 3350 17 Gram(s) Oral daily  predniSONE   Tablet 5 milliGRAM(s) Oral daily  senna 2 Tablet(s) Oral at bedtime  sodium bicarbonate 1300 milliGRAM(s) Oral three times a day  trimethoprim   80 mG/sulfamethoxazole 400 mG 1 Tablet(s) Oral <User Schedule>  valGANciclovir 450 milliGRAM(s) Oral <User Schedule>      LABS:  06-22    145  |  107  |  38<H>  ----------------------------<  167<H>  4.6   |  22  |  1.75<H>    Ca    9.1      22 Jun 2022 06:05  Phos  3.0     06-22  Mg     2.0     06-22    TPro  6.6  /  Alb  2.7<L>  /  TBili  0.4  /  DBili      /  AST  23  /  ALT  12  /  AlkPhos  87  06-22    Creatinine Trend: 1.75 <--, 1.46 <--, 1.53 <--, 1.54 <--, 1.68 <--, 1.76 <--, 1.87 <--, 1.82 <--, 1.70 <--, 1.70 <--, 1.75 <--, 1.68 <--, 1.66 <--    Albumin, Serum: 2.7 g/dL (06-22 @ 06:05)  Phosphorus Level, Serum: 3.0 mg/dL (06-22 @ 06:05)                              7.5    3.59  )-----------( 231      ( 22 Jun 2022 06:05 )             24.4     Urine Studies:  Urinalysis - [06-10-22 @ 22:28]      Color Light Yellow / Appearance Clear / SG 1.016 / pH 6.0      Gluc 100 mg/dL / Ketone Negative  / Bili Negative / Urobili Negative       Blood Small / Protein Trace / Leuk Est Negative / Nitrite Negative      RBC 25 / WBC 5 / Hyaline  / Gran  / Sq Epi  / Non Sq Epi 1 / Bacteria Negative      Iron 17, TIBC 171, %sat 10      [05-02-22 @ 13:03]  Ferritin 1505      [05-02-22 @ 13:05]  PTH -- (Ca 9.2)      [02-05-22 @ 10:13]   294  HbA1c 6.0      [02-21-20 @ 08:53]  Lipid: chol 118, TG 54, HDL 59, LDL --      [06-27-21 @ 09:44]        RADIOLOGY & ADDITIONAL STUDIES:

## 2022-06-22 NOTE — PROGRESS NOTE ADULT - ASSESSMENT
66 yr old M with Type 2 DM A1C 6.6 c/b ESRD s/p DDRT on 3/21, CVA, CAD s/p CABG, Afib on apixaban, seizure activity, HTN, HLD, h/o GI bleed in 2/2022 EGD with duodenal ulcer, discharged to [facility] rehab center after prior hospitalization, now re-admitted from [facility] for seizures c/f status epilepticus in setting of UTI, requiring intubation for hypoxic respiratory failure and admission to MICU, ccb pleural effusion c/b bleeding s/p VATS. Now extubated and transferred to medicine floor. 6/21 s/p RRT code stroke, symptoms likely due to seizure, awaiting EEG    1) T2DM with complications with hyper and hypoglycemia    - goal glucose 100-180  - continued decreased po intake.  Glucose values improved today, continue to monitor on current regimen:  -  Lantus 5 Units at bedtime HS and  Admelog  5 Units before meals (hold if npo or not tolerating PO) plus low admelog scale before meals and bedtime    Discharge  - Likely discharge on basal bolus insulin, Plan TBD based on insulin requirements at [facility]. Outpatient f/u with Endocrinologist Dr. Lincoln.       2) HLD  - continue statin     3) Hypothyroidism  - continue LT4 50 mcg daily  - monitor TFTs outpatient      Dennise Castillo MD  pager  or via Teams on 6/22/22  Other times:  Diabetes team: [phone] business hours  [phone] night/weekend  or email [email or web address]

## 2022-06-22 NOTE — PROGRESS NOTE ADULT - SUBJECTIVE AND OBJECTIVE BOX
Diabetes  Follow up note    Interval History/ROS: Has had decreased po intake,   Further seizure activity, will have EEG.    MEDICATIONS  (STANDING):  apixaban 5 milliGRAM(s) Oral every 12 hours  atorvastatin 40 milliGRAM(s) Oral at bedtime  chlorhexidine 2% Cloths 1 Application(s) Topical <User Schedule>  dextrose 5%. 1000 milliLiter(s) (50 mL/Hr) IV Continuous <Continuous>  dextrose 50% Injectable 25 Gram(s) IV Push once  dextrose 50% Injectable 12.5 Gram(s) IV Push once  dextrose 50% Injectable 25 Gram(s) IV Push once  diVALproex DR 1000 milliGRAM(s) Oral two times a day  ertapenem  IVPB      insulin glargine Injectable (LANTUS) 5 Unit(s) SubCutaneous at bedtime  insulin lispro (ADMELOG) corrective regimen sliding scale   SubCutaneous three times a day before meals  insulin lispro (ADMELOG) corrective regimen sliding scale   SubCutaneous at bedtime  insulin lispro Injectable (ADMELOG) 5 Unit(s) SubCutaneous three times a day before meals  lacosamide 100 milliGRAM(s) Oral <User Schedule>  lacosamide 100 milliGRAM(s) Oral once  levETIRAcetam 250 milliGRAM(s) Oral two times a day  levothyroxine 50 MICROGram(s) Oral daily  magnesium oxide 400 milliGRAM(s) Oral every 8 hours  multivitamin 1 Tablet(s) Oral daily  mycophenolate mofetil 500 milliGRAM(s) Oral <User Schedule>  NIFEdipine XL 60 milliGRAM(s) Oral daily  nystatin    Suspension 604184 Unit(s) Oral four times a day  pantoprazole    Tablet 40 milliGRAM(s) Oral before breakfast  polyethylene glycol 3350 17 Gram(s) Oral daily  predniSONE   Tablet 5 milliGRAM(s) Oral daily  senna 2 Tablet(s) Oral at bedtime  sodium bicarbonate 1300 milliGRAM(s) Oral three times a day  trimethoprim   80 mG/sulfamethoxazole 400 mG 1 Tablet(s) Oral <User Schedule>  valGANciclovir 450 milliGRAM(s) Oral <User Schedule>    MEDICATIONS  (PRN):  acetaminophen     Tablet .. 975 milliGRAM(s) Oral every 6 hours PRN Mild Pain (1 - 3)  dextrose Oral Gel 15 Gram(s) Oral once PRN Blood Glucose LESS THAN 70 milliGRAM(s)/deciliter      Allergies    No Known Allergies    Intolerances          PHYSICAL EXAM:  VITALS: T(C): 36.7 (06-22-22 @ 13:00)  T(F): 98.1 (06-22-22 @ 13:00), Max: 98.2 (06-21-22 @ 16:26)  HR: 75 (06-22-22 @ 13:00) (73 - 82)  BP: 119/53 (06-22-22 @ 13:00) (119/53 - 157/65)  RR:  (18 - 18)  SpO2:  (94% - 97%)  Wt(kg): 61.7  GENERAL: Lying in bed in NAD,   EYES: No proptosis,  HEENT:  Atraumatic, Normocephalic,   CV - no LE edema      POCT Blood Glucose.: 120 mg/dL (06-22-22 @ 12:26)  POCT Blood Glucose.: 136 mg/dL (06-22-22 @ 08:35)  POCT Blood Glucose.: 216 mg/dL (06-22-22 @ 03:13)  POCT Blood Glucose.: 234 mg/dL (06-21-22 @ 21:24)  POCT Blood Glucose.: 156 mg/dL (06-21-22 @ 17:13)  POCT Blood Glucose.: 123 mg/dL (06-21-22 @ 12:32)  POCT Blood Glucose.: 116 mg/dL (06-21-22 @ 08:33)  POCT Blood Glucose.: 76 mg/dL (06-21-22 @ 07:48)  POCT Blood Glucose.: 176 mg/dL (06-20-22 @ 21:43)  POCT Blood Glucose.: 238 mg/dL (06-20-22 @ 17:31)  POCT Blood Glucose.: 278 mg/dL (06-20-22 @ 13:13)  POCT Blood Glucose.: 167 mg/dL (06-20-22 @ 08:33)  POCT Blood Glucose.: 207 mg/dL (06-19-22 @ 21:34)  POCT Blood Glucose.: 246 mg/dL (06-19-22 @ 17:16)        06-22    145  |  107  |  38<H>  ----------------------------<  167<H>  4.6   |  22  |  1.75<H>    eGFR: 42<L>    Ca    9.1      06-22  Mg     2.0     06-22  Phos  3.0     06-22    TPro  6.6  /  Alb  2.7<L>  /  TBili  0.4  /  DBili  x   /  AST  23  /  ALT  12  /  AlkPhos  87  06-22        A1C with Estimated Average Glucose Result: 6.6 % (04-24-22 @ 17:12)  A1C with Estimated Average Glucose Result: 7.3 % (02-04-22 @ 13:42)  A1C with Estimated Average Glucose Result: 7.2 % (02-04-22 @ 05:48)  A1C with Estimated Average Glucose Result: 6.5 % (06-27-21 @ 09:34)

## 2022-06-22 NOTE — PROGRESS NOTE ADULT - SUBJECTIVE AND OBJECTIVE BOX
[facility] DIVISION OF KIDNEY DISEASES AND HYPERTENSION -- FOLLOW UP NOTE  --------------------------------------------------------------------------------  HPI:  66 yr old man with ESRD due to DM was on HD since 2019, s/p DCD DDRT on 4/21/22. Donor was 58 , KDPI 82%, DCD, single vessels and ureter, HLA mismatch 1, 2, 2. No DSA, cPRA 0%. 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 Readmitted in May for anemia in the setting of large perinephric hematoma s/p evacuation and repair of arterial anastomosis on 5/2/22. Intra operative biopsy showed no rejection, Creatinine ranging 2mg/dL. He was recently dx with klebsiella UTI rx with ertapenem - then switched to Levaquin day #6. He also had parainfluenza pneumonia. Was at rehab progressing well. Presented with status epilepticus. Intubated for respiratory protection. Right pleural effusion drained 1300ml. Transfused 1 unit PRBC. Pt. transferred to SICU and was transfused 4 additional units for a total of 6 units.     Pt. seen this AM. Pt. more awake this AM. Pt. with CXR suggestive of PNA, Ertapenem to be resumed. For Belatacept 6/23.       PAST HISTORY  --------------------------------------------------------------------------------  No significant changes to PMH, PSH, FHx, SHx, unless otherwise noted    ALLERGIES & MEDICATIONS  --------------------------------------------------------------------------------  Allergies    No Known Allergies    Intolerances      Standing Inpatient Medications  apixaban 5 milliGRAM(s) Oral every 12 hours  atorvastatin 40 milliGRAM(s) Oral at bedtime  chlorhexidine 2% Cloths 1 Application(s) Topical <User Schedule>  dextrose 5%. 1000 milliLiter(s) IV Continuous <Continuous>  dextrose 50% Injectable 25 Gram(s) IV Push once  dextrose 50% Injectable 12.5 Gram(s) IV Push once  dextrose 50% Injectable 25 Gram(s) IV Push once  diVALproex DR 1000 milliGRAM(s) Oral two times a day  ertapenem  IVPB      insulin glargine Injectable (LANTUS) 5 Unit(s) SubCutaneous at bedtime  insulin lispro (ADMELOG) corrective regimen sliding scale   SubCutaneous three times a day before meals  insulin lispro (ADMELOG) corrective regimen sliding scale   SubCutaneous at bedtime  insulin lispro Injectable (ADMELOG) 5 Unit(s) SubCutaneous three times a day before meals  levETIRAcetam 250 milliGRAM(s) Oral two times a day  levothyroxine 50 MICROGram(s) Oral daily  magnesium oxide 400 milliGRAM(s) Oral every 8 hours  multivitamin 1 Tablet(s) Oral daily  mycophenolate mofetil 500 milliGRAM(s) Oral <User Schedule>  NIFEdipine XL 60 milliGRAM(s) Oral daily  nystatin    Suspension 871098 Unit(s) Oral four times a day  pantoprazole    Tablet 40 milliGRAM(s) Oral before breakfast  polyethylene glycol 3350 17 Gram(s) Oral daily  predniSONE   Tablet 5 milliGRAM(s) Oral daily  senna 2 Tablet(s) Oral at bedtime  sodium bicarbonate 1300 milliGRAM(s) Oral three times a day  trimethoprim   80 mG/sulfamethoxazole 400 mG 1 Tablet(s) Oral <User Schedule>  valGANciclovir 450 milliGRAM(s) Oral <User Schedule>    PRN Inpatient Medications  acetaminophen     Tablet .. 975 milliGRAM(s) Oral every 6 hours PRN  dextrose Oral Gel 15 Gram(s) Oral once PRN      REVIEW OF SYSTEMS  --------------------------------------------------------------------------------  Gen: No fevers/chills  Respiratory: No dyspnea, cough,   CV: No chest pain, PND, orthopnea  GI: No abdominal pain, diarrhea, constipation, nausea, vomiting  Transplant: No pain  : No increased frequency, dysuria, hematuria   MSK: No edema, R. shoulder pain.   Neuro: No dizziness/lightheadedness    All other systems were reviewed and are negative, except as noted.    VITALS/PHYSICAL EXAM  --------------------------------------------------------------------------------  T(C): 36.7 (06-22-22 @ 13:00), Max: 36.8 (06-21-22 @ 16:26)  HR: 75 (06-22-22 @ 13:00) (73 - 82)  BP: 119/53 (06-22-22 @ 13:00) (119/53 - 157/65)  RR: 18 (06-22-22 @ 13:00) (18 - 18)  SpO2: 97% (06-22-22 @ 13:00) (94% - 97%)  Wt(kg): --        06-21-22 @ 07:01  -  06-22-22 @ 07:00  --------------------------------------------------------  IN: 360 mL / OUT: 300 mL / NET: 60 mL    Physical Exam:  	Gen: Awake but confused, repeating words  	HEENT: PERRL, MMM   	Pulm: CTAB anteriorly  	CV: S1S2+  	Abd: +BS, soft, non-tender          Transplant: No tenderness, swelling  	: No suprapubic tenderness, external catheter  	MSK: no LE edema, some bilateral UE edema  R>L- improving. B/l tremors R>L. RUE weakness  	Psych: Confused  	Skin: Warm          Access: Peripheral     LABS/STUDIES  --------------------------------------------------------------------------------              7.5    3.59  >-----------<  231      [06-22-22 @ 06:05]              24.4     145  |  107  |  38  ----------------------------<  167      [06-22-22 @ 06:05]  4.6   |  22  |  1.75        Ca     9.1     [06-22-22 @ 06:05]      Mg     2.0     [06-22-22 @ 06:05]      Phos  3.0     [06-22-22 @ 06:05]    TPro  6.6  /  Alb  2.7  /  TBili  0.4  /  DBili  x   /  AST  23  /  ALT  12  /  AlkPhos  87  [06-22-22 @ 06:05]    PT/INR: PT 14.8 , INR 1.27       [06-22-22 @ 06:05]  PTT: 33.7       [06-22-22 @ 06:05]    CK 28      [06-21-22 @ 08:58]    Creatinine Trend:  SCr 1.75 [06-22 @ 06:05]  SCr 1.46 [06-21 @ 08:58]  SCr 1.53 [06-21 @ 06:16]  SCr 1.54 [06-20 @ 06:24]  SCr 1.68 [06-19 @ 06:19]    Tacrolimus (), Serum: 4.6 ng/mL (06-21 @ 06:16)  Tacrolimus (), Serum: 5.2 ng/mL (06-20 @ 06:26)  Tacrolimus (), Serum: 2.8 ng/mL (06-19 @ 06:19)  Tacrolimus (), Serum: 6.3 ng/mL (06-18 @ 06:49)    Urinalysis - [06-10-22 @ 22:28]      Color Light Yellow / Appearance Clear / SG 1.016 / pH 6.0      Gluc 100 mg/dL / Ketone Negative  / Bili Negative / Urobili Negative       Blood Small / Protein Trace / Leuk Est Negative / Nitrite Negative      RBC 25 / WBC 5 / Hyaline  / Gran  / Sq Epi  / Non Sq Epi 1 / Bacteria Negative      Iron 17, TIBC 171, %sat 10      [05-02-22 @ 13:03]  Ferritin 1505      [05-02-22 @ 13:05]  PTH -- (Ca 9.2)      [02-05-22 @ 10:13]   294  HbA1c 6.0      [02-21-20 @ 08:53]  Lipid: chol 118, TG 54, HDL 59, LDL --      [06-27-21 @ 09:44]

## 2022-06-22 NOTE — PROGRESS NOTE ADULT - SUBJECTIVE AND OBJECTIVE BOX
Neurology Progress Note  Jeb Recinos, PGY-2     SUBJECTIVE/OBJECTIVE/INTERVAL EVENTS: Patient seen and examined at bedside w/ neuro attending and team. Feels okay but endorses general weakness (LUE sligthly worse than RUE which was different than yesterday). Otherwise denies headaches, vision changes, nausea, vomiting, new focal sensory deficit.      VITALS & EXAMINATION:  Vital Signs Last 24 Hrs  T(C): 36.6 (22 Jun 2022 09:07), Max: 36.8 (21 Jun 2022 16:26)  T(F): 97.8 (22 Jun 2022 09:07), Max: 98.2 (21 Jun 2022 16:26)  HR: 80 (22 Jun 2022 09:07) (73 - 82)  BP: 122/61 (22 Jun 2022 09:07) (122/61 - 157/65)  BP(mean): --  RR: 18 (22 Jun 2022 09:07) (18 - 18)  SpO2: 96% (22 Jun 2022 09:07) (91% - 96%)    Physical Examination:  General: Laying in bed, mildly underweight, tired appearing  Head: normocephalic  Eyes: clear sclera  Neck: no stiff neck  Respiratory: On NC, in no acute respiratory distress  Skin: well perfused    Neurologic:  - Mental Status: Awake with eyes open but lethargic, oriented to place, person, situation  - Speech is mildly hypophonic and slowed with intermittent pauses. Fluent. Appears intact comprehension; Follows few simple commands although occasionally requires repetition to complete task.  - Cranial Nerves: PERRL w/ no sluggishness, no fixed gaze, VFF to confrontation b/l. EOMI w/o nystagmus. No ezio facial asymmetry.   - Motor: Increased extremity tone RUE > LUE and also increased moderately in b/l LE.   - Reflexes: 2+ and symmetric at the biceps, 3+ knees L>R. Toes downgoing R mute L.   - Sensory: Intact throughout to noxious stimuli  - Coordination: cannot assess  - Gait: cannot assess    LABORATORY:  CBC                       7.5    3.59  )-----------( 231      ( 22 Jun 2022 06:05 )             24.4     Chem 06-22    145  |  107  |  38<H>  ----------------------------<  167<H>  4.6   |  22  |  1.75<H>    Ca    9.1      22 Jun 2022 06:05  Phos  3.0     06-22  Mg     2.0     06-22    TPro  6.6  /  Alb  2.7<L>  /  TBili  0.4  /  DBili  x   /  AST  23  /  ALT  12  /  AlkPhos  87  06-22    LFTs LIVER FUNCTIONS - ( 22 Jun 2022 06:05 )  Alb: 2.7 g/dL / Pro: 6.6 g/dL / ALK PHOS: 87 U/L / ALT: 12 U/L / AST: 23 U/L / GGT: x           Coagulopathy PT/INR - ( 22 Jun 2022 06:05 )   PT: 14.8 sec;   INR: 1.27 ratio         PTT - ( 22 Jun 2022 06:05 )  PTT:33.7 sec  Lipid Panel   A1c   Cardiac enzymes CARDIAC MARKERS ( 21 Jun 2022 08:58 )  x     / x     / 28 U/L / x     / x          U/A   CSF  Immunological  Other    STUDIES & IMAGING: (EEG, CT, MR, U/S, TTE/DINAH):

## 2022-06-23 NOTE — PROGRESS NOTE ADULT - ASSESSMENT
66 yr old Male with PMHx ESRD s/p permacath removal 5/10/22 s/p Rt DDRT 4/21/22,  CVA (2019), Afib on apixaban, seizure activity, CAD s/p stents, CABG (2020), HTN, HLD, DM on insulin, gastric/duodenal ulcer, recent hospitalization 4/28/22 -5/10/22 with weakness/anemia found to have perinephric hematoma requiring evacuation and repair of bleeding arterial anastomosis. Curently being treated for UTI with Levaquin Today after developing multiple sz episodes refractory to 5 mg versed IM (EMS) and 2 mg ativan IV in E.D. concerning for status epilepticus requiring intubation for hypoxic respiratory  failure. MICU Consult was called for hypoxic respiratory failure secondary to status epilepticus.  Nephrology consulted for renal failure.       A/P    DDRT on 04/21/22  Course complicated by DGF, was on HD until 4/29/22. Readmitted in May for anemia in the setting of large perinephric hematoma s/p evacuation and repair of arterial anastomosis on 5/2/22. Intra operative biopsy showed no rejection.  ANA likely sec to  hemodynamic change   scr is better today   optimize glucose   monitor BMP, U/O     HTN   optimal   monitor BP closely     Immunosuppresssion  continue per transplant team

## 2022-06-23 NOTE — PROGRESS NOTE ADULT - NUTRITIONAL ASSESSMENT
Diet, Regular:   Consistent Carbohydrate {Evening Snack} (CSTCHOSN)  Soft and Bite Sized (SOFTBTSZ)  Mildly Thick Liquids (MILDTHICKLIQS)  Nepro Cans or Servings Per Day:  1       Frequency:  Two Times a day (06-19-22 @ 15:59) [Active]

## 2022-06-23 NOTE — PROGRESS NOTE ADULT - PROBLEM SELECTOR PLAN 2
S/p Thymo induction. Stop CNI as MRI concerning for PRES and increased seizure activity. Will start on Belatacept today. C/w prednisone 5mg daily. Increase Cellcept to 1gm BID. C/w Valcyte. Discontinued Fluconazole. C/w Nystatin. Resume bactrim as hyperkalemia has resolved. Glucose now better controlled.     If you have any questions, please feel free to contact me  Ant Pantoja  Nephrology Fellow  [phone]; Prefer [employer] TEAMS  (After 5pm or on weekends please page the on-call fellow).

## 2022-06-23 NOTE — PROGRESS NOTE ADULT - SUBJECTIVE AND OBJECTIVE BOX
Follow Up:  Pleural effusion    Interval History: afebrile. denies back pain. denies cough. denies chills.     REVIEW OF SYSTEMS  [  ] ROS unobtainable because:    [x  ] All other systems negative except as noted below    Constitutional:  [ ] fever [ ] chills  [ ] weight loss  [ ] weakness  Skin:  [ ] rash [ ] phlebitis	  Eyes: [ ] icterus [ ] pain  [ ] discharge	  ENMT: [ ] sore throat  [ ] thrush [ ] ulcers [ ] exudates  Respiratory: [ ] dyspnea [ ] hemoptysis [ ] cough [ ] sputum	  Cardiovascular:  [ ] chest pain [ ] palpitations [ ] edema	  Gastrointestinal:  [ ] nausea [ ] vomiting [ ] diarrhea [ ] constipation [ ] pain	  Genitourinary:  [ ] dysuria [ ] frequency [ ] hematuria [ ] discharge [ ] flank pain  [ ] incontinence  Musculoskeletal:  [ ] myalgias [ ] arthralgias [ ] arthritis  [ ] back pain  Neurological:  [ ] headache [ ] seizures  [ ] confusion/altered mental status    Allergies  No Known Allergies        ANTIMICROBIALS:  ertapenem  IVPB 1000 every 24 hours  ertapenem  IVPB    nystatin    Suspension 171225 four times a day  trimethoprim   80 mG/sulfamethoxazole 400 mG 1 <User Schedule>  valGANciclovir 450 <User Schedule>      OTHER MEDS:  MEDICATIONS  (STANDING):  acetaminophen     Tablet .. 975 every 6 hours PRN  apixaban 5 every 12 hours  atorvastatin 40 at bedtime  dextrose 50% Injectable 25 once  dextrose 50% Injectable 12.5 once  dextrose 50% Injectable 25 once  dextrose Oral Gel 15 once PRN  diVALproex DR 1000 two times a day  insulin glargine Injectable (LANTUS) 5 at bedtime  insulin lispro (ADMELOG) corrective regimen sliding scale  three times a day before meals  insulin lispro (ADMELOG) corrective regimen sliding scale  at bedtime  insulin lispro Injectable (ADMELOG) 4 three times a day before meals  lacosamide 100 <User Schedule>  levETIRAcetam 250 two times a day  levothyroxine 50 daily  mycophenolate mofetil 500 <User Schedule>  NIFEdipine XL 60 daily  pantoprazole    Tablet 40 before breakfast  polyethylene glycol 3350 17 daily  predniSONE   Tablet 5 daily  senna 2 at bedtime      Vital Signs Last 24 Hrs  T(C): 36.4 (23 Jun 2022 13:00), Max: 37 (23 Jun 2022 03:19)  T(F): 97.6 (23 Jun 2022 13:00), Max: 98.6 (23 Jun 2022 03:19)  HR: 72 (23 Jun 2022 13:00) (72 - 89)  BP: 119/63 (23 Jun 2022 13:00) (119/63 - 163/72)  BP(mean): --  RR: 18 (23 Jun 2022 13:00) (18 - 18)  SpO2: 97% (23 Jun 2022 13:00) (94% - 100%)    PHYSICAL EXAMINATION:  General: Alert and Awake, NAD  Cardiac: RRR, No M/R/G  Resp: CTAB, No Wh/Rh/Ra  Abdomen: NBS, NT/ND, No HSM, No rigidity or guarding  MSK: No LE edema. No Calf tenderness  : + tucker  Skin: No rashes or lesions. Skin is warm and dry to the touch.   Neuro: Alert and Awake. CN 2-12 Grossly intact. Moves all four extremities spontaneously.  Psych: Calm, Pleasant, Cooperative                          7.2    3.97  )-----------( 281      ( 23 Jun 2022 11:01 )             23.6       06-23    138  |  101  |  41<H>  ----------------------------<  215<H>  4.4   |  23  |  1.73<H>    Ca    8.7      23 Jun 2022 06:55  Phos  3.2     06-23  Mg     1.8     06-23    TPro  6.5  /  Alb  2.7<L>  /  TBili  0.3  /  DBili  x   /  AST  21  /  ALT  10  /  AlkPhos  87  06-23          MICROBIOLOGY:  v  .Blood Blood-Peripheral  06-21-22   No growth to date.  --  --      .Blood Blood  06-19-22   No growth to date.  --  --      .Body Fluid Pleural Fluid  06-11-22   No growth at 5 days  --    Few polymorphonuclear leukocytes seen  No organisms seen      .Blood Blood  06-10-22   No Growth Final  --  --      .Blood Blood  06-10-22   No Growth Final  --  --    RADIOLOGY:    <The imaging below has been reviewed and visualized by me independently. Findings as detailed in report below>    < from: Xray Chest 1 View AP/PA (06.21.22 @ 09:59) >  IMPRESSION:  Rounded opacity in right midlung concerning for loculated pleural fluid   or pneumonia. Small bilateral pleural effusions.    < end of copied text >

## 2022-06-23 NOTE — PROGRESS NOTE ADULT - SUBJECTIVE AND OBJECTIVE BOX
Diabetes Follow up note:    Chief complaint: T2DM    Interval Hx: BG values 70s last night prior to bedtime. Lantus held-->BG in 230s this AM. Pt seen at bedside. Reports tolerating POs w/good appetite today. Does not recall what he ate for dinner last night but says endorses has been eating all of his meals.     Review of Systems:  General: denies pain  GI: Tolerating POs. Denies N/V/D/Abd pain  CV: Denies CP/SOB  ENDO: No S&Sx of hypoglycemia    MEDS:  atorvastatin 40 milliGRAM(s) Oral at bedtime  insulin glargine Injectable (LANTUS) 5 Unit(s) SubCutaneous at bedtime  insulin lispro (ADMELOG) corrective regimen sliding scale   SubCutaneous three times a day before meals  insulin lispro (ADMELOG) corrective regimen sliding scale   SubCutaneous at bedtime  insulin lispro Injectable (ADMELOG) 4 Unit(s) SubCutaneous three times a day before meals  levothyroxine 50 MICROGram(s) Oral daily  predniSONE   Tablet 5 milliGRAM(s) Oral daily    ertapenem  IVPB 1000 milliGRAM(s) IV Intermittent every 24 hours  ertapenem  IVPB      nystatin    Suspension 825218 Unit(s) Oral four times a day  trimethoprim   80 mG/sulfamethoxazole 400 mG 1 Tablet(s) Oral <User Schedule>  valGANciclovir 450 milliGRAM(s) Oral <User Schedule>    Allergies    No Known Allergies      PE:  General: Male lying in bed. NAD>   Vital Signs Last 24 Hrs  T(C): 36.8 (23 Jun 2022 11:33), Max: 37 (23 Jun 2022 03:19)  T(F): 98.2 (23 Jun 2022 11:33), Max: 98.6 (23 Jun 2022 03:19)  HR: 75 (23 Jun 2022 11:33) (75 - 89)  BP: 125/69 (23 Jun 2022 11:33) (119/53 - 163/72)  BP(mean): --  RR: 18 (23 Jun 2022 11:33) (18 - 18)  SpO2: 98% (23 Jun 2022 11:33) (94% - 100%)  Abd: Soft, NT,ND,   Extremities: Warm  Neuro: A&O X3    LABS:  POCT Blood Glucose.: 141 mg/dL (06-23-22 @ 12:14)  POCT Blood Glucose.: 230 mg/dL (06-23-22 @ 08:31)  POCT Blood Glucose.: 230 mg/dL (06-23-22 @ 03:11)  POCT Blood Glucose.: 121 mg/dL (06-22-22 @ 23:40)  POCT Blood Glucose.: 81 mg/dL (06-22-22 @ 22:18)  POCT Blood Glucose.: 70 mg/dL (06-22-22 @ 21:19)  POCT Blood Glucose.: 142 mg/dL (06-22-22 @ 17:19)  POCT Blood Glucose.: 120 mg/dL (06-22-22 @ 12:26)  POCT Blood Glucose.: 136 mg/dL (06-22-22 @ 08:35)  POCT Blood Glucose.: 216 mg/dL (06-22-22 @ 03:13)  POCT Blood Glucose.: 234 mg/dL (06-21-22 @ 21:24)  POCT Blood Glucose.: 156 mg/dL (06-21-22 @ 17:13)  POCT Blood Glucose.: 123 mg/dL (06-21-22 @ 12:32)  POCT Blood Glucose.: 116 mg/dL (06-21-22 @ 08:33)  POCT Blood Glucose.: 76 mg/dL (06-21-22 @ 07:48)  POCT Blood Glucose.: 176 mg/dL (06-20-22 @ 21:43)  POCT Blood Glucose.: 238 mg/dL (06-20-22 @ 17:31)  POCT Blood Glucose.: 278 mg/dL (06-20-22 @ 13:13)                            7.2    3.97  )-----------( 281      ( 23 Jun 2022 11:01 )             23.6       06-23    138  |  101  |  41<H>  ----------------------------<  215<H>  4.4   |  23  |  1.73<H>    eGFR: 43<L>    Ca    8.7      06-23  Mg     1.8     06-23  Phos  3.2     06-23    TPro  6.5  /  Alb  2.7<L>  /  TBili  0.3  /  DBili  x   /  AST  21  /  ALT  10  /  AlkPhos  87  06-23        A1C with Estimated Average Glucose Result: 6.6 % (04-24-22 @ 17:12)  A1C with Estimated Average Glucose Result: 7.3 % (02-04-22 @ 13:42)  A1C with Estimated Average Glucose Result: 7.2 % (02-04-22 @ 05:48)  A1C with Estimated Average Glucose Result: 6.5 % (06-27-21 @ 09:34)          Contact number: serge [phone] or [phone]

## 2022-06-23 NOTE — PROGRESS NOTE ADULT - ASSESSMENT
66 yr old man with h/o DM type II, HTN, CAD s/p CABG in 2020, Afib on Eliquis, CVA in 2019 due to Afib, Seizure d/o (last episode was on 4/8/22), h/o GI bleed in 2/2022 EGD with duodenal ulcer and ESRD on HD s/p R DDRT from DCD donor on 4/21/22 complicated by DGF requiring HD until 4/29/22 now with good graft function who presented with status epilepticus in setting of UTI/antibiotics and sub-therapeutic valproic acid level     Neuro:  - Imaging concerning for PRES: FK discontinued; plan to start Belatacept today  - On Keppra and Valproic acid, adjusted by Neuro  - Keep Mag > 2    R DCD DDRT (ureter stented) 4/21/22  - stable renal function   - Strict I/Os  - Regular diet  - will attempt to remove stent this admission      Immunosuppression:   - Belatacept #1 today. MMF 500mg bid (will increase once off ABX), Pred 5  - PPX: Valcyte (MWF), Fluconazole 200mg daily (on fluconazole after repair of arterial anastomosis).  Hold Bactrim for hyperkalemia.    R Pleural effusion  - resolved, chest tubes removed  - persistent RML opacity at site of prior chest tubes on daily CXRs.  loculated effusion: ?infectious, on Ertapenem as of 6/22, will involve Transplant ID  - will involve Thoracic to comment    DM  - on Lantus/Lispro, Endocrine following     AFib:  - on home Eliquis   - watch h/h, transfuse prn    HTN:  - continue Nifedipine  - Keep SBP < 150    Dispo  - PT following: [place] vs. home with services 66 yr old man with h/o DM type II, HTN, CAD s/p CABG in 2020, Afib on Eliquis, CVA in 2019 due to Afib, Seizure d/o (last episode was on 4/8/22), h/o GI bleed in 2/2022 EGD with duodenal ulcer and ESRD on HD s/p R DDRT from DCD donor on 4/21/22 complicated by DGF requiring HD until 4/29/22 now with good graft function who presented with status epilepticus in setting of UTI/antibiotics and sub-therapeutic valproic acid level     Neuro:  - Imaging concerning for PRES: FK discontinued; plan to start Belatacept today  - On Keppra and Valproic acid, adjusted by Neuro  - Keep Mag > 2    R DCD DDRT (ureter stented) 4/21/22  - stable renal function   - Strict I/Os  - Regular diet  - will attempt to remove stent this admission      Immunosuppression:   - Belatacept #1 today. MMF 500mg bid (will increase once off ABX), Pred 5  - PPX: Valcyte (MWF), completed Fluconazole, start Nystatin.  Restart Bactrim MWF    R Pleural effusion  - resolved, chest tubes removed  - persistent RML opacity at site of prior chest tubes on daily CXRs.  loculated effusion: ?infectious, on Ertapenem as of 6/22, will involve Transplant ID  - will involve Thoracic to comment    DM  - on Lantus/Lispro, Endocrine following     AFib:  - on home Eliquis   - watch h/h, transfuse prn    HTN:  - continue Nifedipine  - Keep SBP < 150    Dispo  - PT following: [place] vs. home with services 66 yr old man with h/o DM type II, HTN, CAD s/p CABG in 2020, Afib on Eliquis, CVA in 2019 due to Afib, Seizure d/o (last episode was on 4/8/22), h/o GI bleed in 2/2022 EGD with duodenal ulcer and ESRD on HD s/p R DDRT from DCD donor on 4/21/22 complicated by DGF requiring HD until 4/29/22 now with good graft function who presented with status epilepticus in setting of UTI/antibiotics and sub-therapeutic valproic acid level     Neuro:  - Imaging concerning for PRES: FK discontinued; plan to start Belatacept today  - On Keppra and Valproic acid, adjusted by Neuro  - Keep Mag > 2    R DCD DDRT (ureter stented) 4/21/22  - stable renal function   - Strict I/Os  - Regular diet  - will attempt to remove stent this admission      Immunosuppression:   - Belatacept #1 today. #2 on Monday.    - continue MMF 500mg bid (will increase once off ABX), Pred 5  - PPX: Valcyte (MWF), completed Fluconazole, start Nystatin.  Restart Bactrim MWF    R Pleural effusion  - resolved, chest tubes removed  - persistent RML opacity at site of prior chest tubes on daily CXRs.  loculated effusion: ?infectious, on Ertapenem as of 6/22, will involve Transplant ID  - will involve Thoracic to comment    DM  - on Lantus/Lispro, Endocrine following     AFib:  - on home Eliquis   - watch h/h, transfuse prn    HTN:  - continue Nifedipine  - Keep SBP < 150    Dispo  - PT following: [place] vs. home with services  - potential d/c Monday

## 2022-06-23 NOTE — PROGRESS NOTE ADULT - SUBJECTIVE AND OBJECTIVE BOX
[facility]OLOG[facility]ACT[facility]   MD NINA MASON MD, PA KRISTINE SOLTANPOUR, DO INJUNG KO, NP    TEL:  OFFICE: [phone]    From 5pm-7am Answering Service [phone]    -- RENAL FOLLOW UP NOTE ---Date of Service 06-23-22 @ 11:41    Patient is a 66y old  Male who presents with a chief complaint of Status Epilepticus (23 Jun 2022 10:11)      Patient seen and examined at bedside. No chest pain/sob    VITALS:  T(F): 98.2 (06-23-22 @ 11:33), Max: 98.6 (06-23-22 @ 03:19)  HR: 75 (06-23-22 @ 11:33)  BP: 125/69 (06-23-22 @ 11:33)  RR: 18 (06-23-22 @ 11:33)  SpO2: 98% (06-23-22 @ 11:33)  Wt(kg): --    06-22 @ 07:01  -  06-23 @ 07:00  --------------------------------------------------------  IN: 720 mL / OUT: 0 mL / NET: 720 mL          PHYSICAL EXAM:  Constitutional: NAD  Neck: No JVD  Respiratory: CTAB, no wheezes, rales or rhonchi  Cardiovascular: S1, S2, RRR  Gastrointestinal: BS+, soft, NT/ND  Extremities: No peripheral edema    Hospital Medications:   MEDICATIONS  (STANDING):  apixaban 5 milliGRAM(s) Oral every 12 hours  atorvastatin 40 milliGRAM(s) Oral at bedtime  chlorhexidine 2% Cloths 1 Application(s) Topical <User Schedule>  dextrose 5%. 1000 milliLiter(s) (50 mL/Hr) IV Continuous <Continuous>  dextrose 50% Injectable 25 Gram(s) IV Push once  dextrose 50% Injectable 12.5 Gram(s) IV Push once  dextrose 50% Injectable 25 Gram(s) IV Push once  diVALproex DR 1000 milliGRAM(s) Oral two times a day  ertapenem  IVPB 1000 milliGRAM(s) IV Intermittent every 24 hours  ertapenem  IVPB      insulin glargine Injectable (LANTUS) 5 Unit(s) SubCutaneous at bedtime  insulin lispro (ADMELOG) corrective regimen sliding scale   SubCutaneous three times a day before meals  insulin lispro (ADMELOG) corrective regimen sliding scale   SubCutaneous at bedtime  insulin lispro Injectable (ADMELOG) 4 Unit(s) SubCutaneous three times a day before meals  lacosamide 100 milliGRAM(s) Oral <User Schedule>  levETIRAcetam 250 milliGRAM(s) Oral two times a day  levothyroxine 50 MICROGram(s) Oral daily  magnesium oxide 400 milliGRAM(s) Oral every 8 hours  multivitamin 1 Tablet(s) Oral daily  mycophenolate mofetil 500 milliGRAM(s) Oral <User Schedule>  NIFEdipine XL 60 milliGRAM(s) Oral daily  nystatin    Suspension 147326 Unit(s) Oral four times a day  pantoprazole    Tablet 40 milliGRAM(s) Oral before breakfast  polyethylene glycol 3350 17 Gram(s) Oral daily  predniSONE   Tablet 5 milliGRAM(s) Oral daily  senna 2 Tablet(s) Oral at bedtime  sodium bicarbonate 1300 milliGRAM(s) Oral three times a day  trimethoprim   80 mG/sulfamethoxazole 400 mG 1 Tablet(s) Oral <User Schedule>  valGANciclovir 450 milliGRAM(s) Oral <User Schedule>      LABS:  06-23    138  |  101  |  41<H>  ----------------------------<  215<H>  4.4   |  23  |  1.73<H>    Ca    8.7      23 Jun 2022 06:55  Phos  3.2     06-23  Mg     1.8     06-23    TPro  6.5  /  Alb  2.7<L>  /  TBili  0.3  /  DBili      /  AST  21  /  ALT  10  /  AlkPhos  87  06-23    Creatinine Trend: 1.73 <--, 1.75 <--, 1.46 <--, 1.53 <--, 1.54 <--, 1.68 <--, 1.76 <--, 1.87 <--, 1.82 <--    Albumin, Serum: 2.7 g/dL (06-23 @ 06:55)  Phosphorus Level, Serum: 3.2 mg/dL (06-23 @ 06:55)                              7.2    3.97  )-----------( 281      ( 23 Jun 2022 11:01 )             23.6     Urine Studies:  Urinalysis - [06-10-22 @ 22:28]      Color Light Yellow / Appearance Clear / SG 1.016 / pH 6.0      Gluc 100 mg/dL / Ketone Negative  / Bili Negative / Urobili Negative       Blood Small / Protein Trace / Leuk Est Negative / Nitrite Negative      RBC 25 / WBC 5 / Hyaline  / Gran  / Sq Epi  / Non Sq Epi 1 / Bacteria Negative      Iron 17, TIBC 171, %sat 10      [05-02-22 @ 13:03]  Ferritin 1505      [05-02-22 @ 13:05]  PTH -- (Ca 9.2)      [02-05-22 @ 10:13]   294  HbA1c 6.0      [02-21-20 @ 08:53]  Lipid: chol 118, TG 54, HDL 59, LDL --      [06-27-21 @ 09:44]        RADIOLOGY & ADDITIONAL STUDIES:

## 2022-06-23 NOTE — PROGRESS NOTE ADULT - PROBLEM SELECTOR PLAN 4
c/w statin      Can be reached via TEAMS/pager: [phone]   office:  [phone] (M-F 9a-5pm)               [phone] (nights/weekends)   [email or web address] password [email or web address]

## 2022-06-23 NOTE — PROGRESS NOTE ADULT - ATTENDING COMMENTS
Allograft function is stable. MRI head revealed PRES. Tacrolimus was d/c  Belatacept today .Continue Cellcept 500 mg po daily and Prednisone 5 mg po daily

## 2022-06-23 NOTE — PROGRESS NOTE ADULT - PROBLEM SELECTOR PLAN 1
S/p DDRT on 4/21/22 Cr. stable in 2 range. ANA in the setting of hemodynamic instability 2/2 ATN. Donor was 58 , KDPI 82%, DCD, single vessels and ureter, HLA mismatch 1, 2, 2. No DSA, cPRA 0%. 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 Readmitted in May for anemia in the setting of large perinephric hematoma s/p evacuation and repair of arterial anastomosis on 5/2/22. Intra operative biopsy showed no rejection. As outpatient SCr. in th low 2 range. Admitted with lowest documented Cr. of 1.8 since transplant which has trended to 1.7 today. S/p Ertapenem for UTI + 1 dose on 6/22. F/u CT Surgery and ID for recs. CXR reviewed. Optimize hemodynamics. Adequate UOP. C/w NaHCO3 for acidosis and will help with kaliuresis. C/w Nifedipine 30mg Daily for hypertension.    On Depakote and Keppra for seizures. Depakote levels noted low this AM and dose increased. F.u neurology for switching to Vimpat.

## 2022-06-23 NOTE — PROGRESS NOTE ADULT - PROBLEM SELECTOR PLAN 1
-test BG AC/HS  -c/w Lantus 5 units QHS  -Decrease Admelog 4 units AC meals (hold if not eating)  -c/w Admelog low correction scale AC and Low HS scale  -cons carb diet  Discharge  - Likely discharge on basal bolus insulin, Plan TBD based on insulin requirements at DC. Outpatient f/u with Endocrinologist Dr. Lincoln.

## 2022-06-23 NOTE — PROGRESS NOTE ADULT - NSPROGADDITIONALINFOA_GEN_ALL_CORE
26 minutes spent on the development of plan of care/coordination of care/glycemic control through review of labs, blood glucose values and vital signs.

## 2022-06-23 NOTE — PROGRESS NOTE ADULT - SUBJECTIVE AND OBJECTIVE BOX
[facility] DIVISION OF KIDNEY DISEASES AND HYPERTENSION -- FOLLOW UP NOTE  --------------------------------------------------------------------------------  HPI:  66 yr old man with ESRD due to DM was on HD since 2019, s/p DCD DDRT on 4/21/22. Donor was 58 , KDPI 82%, DCD, single vessels and ureter, HLA mismatch 1, 2, 2. No DSA, cPRA 0%. 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 Readmitted in May for anemia in the setting of large perinephric hematoma s/p evacuation and repair of arterial anastomosis on 5/2/22. Intra operative biopsy showed no rejection, Creatinine ranging 2mg/dL. He was recently dx with klebsiella UTI rx with ertapenem - then switched to Levaquin day #6. He also had parainfluenza pneumonia. Was at rehab progressing well. Presented with status epilepticus. Intubated for respiratory protection. Right pleural effusion drained 1300ml. Transfused 1 unit PRBC. Pt. transferred to SICU and was transfused 4 additional units for a total of 6 units.     Pt. seen this AM. RUE more tremulous today. Plan for Belatacept today. Pt. still with some R. shoulder discomfort.     PAST HISTORY  --------------------------------------------------------------------------------  No significant changes to PMH, PSH, FHx, SHx, unless otherwise noted    ALLERGIES & MEDICATIONS  --------------------------------------------------------------------------------  Allergies    No Known Allergies    Intolerances      Standing Inpatient Medications  apixaban 5 milliGRAM(s) Oral every 12 hours  atorvastatin 40 milliGRAM(s) Oral at bedtime  belatacept IVPB 625 milliGRAM(s) IV Intermittent once  chlorhexidine 2% Cloths 1 Application(s) Topical <User Schedule>  dextrose 5%. 1000 milliLiter(s) IV Continuous <Continuous>  dextrose 50% Injectable 25 Gram(s) IV Push once  dextrose 50% Injectable 12.5 Gram(s) IV Push once  dextrose 50% Injectable 25 Gram(s) IV Push once  diVALproex DR 1000 milliGRAM(s) Oral two times a day  ertapenem  IVPB 1000 milliGRAM(s) IV Intermittent every 24 hours  ertapenem  IVPB      insulin glargine Injectable (LANTUS) 5 Unit(s) SubCutaneous at bedtime  insulin lispro (ADMELOG) corrective regimen sliding scale   SubCutaneous three times a day before meals  insulin lispro (ADMELOG) corrective regimen sliding scale   SubCutaneous at bedtime  insulin lispro Injectable (ADMELOG) 5 Unit(s) SubCutaneous three times a day before meals  lacosamide 100 milliGRAM(s) Oral <User Schedule>  levETIRAcetam 250 milliGRAM(s) Oral two times a day  levothyroxine 50 MICROGram(s) Oral daily  magnesium oxide 400 milliGRAM(s) Oral every 8 hours  multivitamin 1 Tablet(s) Oral daily  mycophenolate mofetil 500 milliGRAM(s) Oral <User Schedule>  NIFEdipine XL 60 milliGRAM(s) Oral daily  nystatin    Suspension 766917 Unit(s) Oral four times a day  pantoprazole    Tablet 40 milliGRAM(s) Oral before breakfast  polyethylene glycol 3350 17 Gram(s) Oral daily  predniSONE   Tablet 5 milliGRAM(s) Oral daily  senna 2 Tablet(s) Oral at bedtime  sodium bicarbonate 1300 milliGRAM(s) Oral three times a day  trimethoprim   80 mG/sulfamethoxazole 400 mG 1 Tablet(s) Oral <User Schedule>  valGANciclovir 450 milliGRAM(s) Oral <User Schedule>    PRN Inpatient Medications  acetaminophen     Tablet .. 975 milliGRAM(s) Oral every 6 hours PRN  dextrose Oral Gel 15 Gram(s) Oral once PRN      REVIEW OF SYSTEMS  --------------------------------------------------------------------------------  Gen: No fevers/chills  Respiratory: No dyspnea, cough,   CV: No chest pain, PND, orthopnea  GI: No abdominal pain, diarrhea, constipation, nausea, vomiting  Transplant: No pain  : No increased frequency, dysuria, hematuria   MSK: No edema, R. shoulder pain. RUE edema   Neuro: No dizziness/lightheadedness    All other systems were reviewed and are negative, except as noted.  VITALS/PHYSICAL EXAM  --------------------------------------------------------------------------------  T(C): 36.3 (06-23-22 @ 09:00), Max: 37 (06-23-22 @ 03:19)  HR: 89 (06-23-22 @ 09:00) (75 - 89)  BP: 129/50 (06-23-22 @ 09:00) (119/53 - 163/72)  RR: 18 (06-23-22 @ 09:00) (18 - 18)  SpO2: 95% (06-23-22 @ 09:00) (94% - 100%)  Wt(kg): --        06-22-22 @ 07:01  -  06-23-22 @ 07:00  --------------------------------------------------------  IN: 720 mL / OUT: 0 mL / NET: 720 mL    Physical Exam:  	Gen: Awake but confused  	HEENT: MMM   	Pulm: CTAB anteriorly  	CV: S1S2+  	Abd: +BS, soft, non-tender          Transplant: No tenderness, swelling  	: No suprapubic tenderness, external catheter  	MSK: no LE edema, some RUE edema. B/l tremors R>L. RUE weakness  	Psych: some confusion, waxing and waning   	Skin: Warm          Access: Peripheral     LABS/STUDIES  --------------------------------------------------------------------------------              7.8    4.00  >-----------<  265      [06-23-22 @ 06:57]              25.1     138  |  101  |  41  ----------------------------<  215      [06-23-22 @ 06:55]  4.4   |  23  |  1.73        Ca     8.7     [06-23-22 @ 06:55]      Mg     1.8     [06-23-22 @ 06:55]      Phos  3.2     [06-23-22 @ 06:55]    TPro  6.5  /  Alb  2.7  /  TBili  0.3  /  DBili  x   /  AST  21  /  ALT  10  /  AlkPhos  87  [06-23-22 @ 06:55]    PT/INR: PT 28.4 , INR 2.45       [06-23-22 @ 06:56]  PTT: 50.8       [06-23-22 @ 06:56]      Creatinine Trend:  SCr 1.73 [06-23 @ 06:55]  SCr 1.75 [06-22 @ 06:05]  SCr 1.46 [06-21 @ 08:58]  SCr 1.53 [06-21 @ 06:16]  SCr 1.54 [06-20 @ 06:24]    Tacrolimus (), Serum: 4.6 ng/mL (06-21 @ 06:16)  Tacrolimus (), Serum: 5.2 ng/mL (06-20 @ 06:26)  Tacrolimus (), Serum: 2.8 ng/mL (06-19 @ 06:19)  Tacrolimus (), Serum: 6.3 ng/mL (06-18 @ 06:49)      Urinalysis - [06-10-22 @ 22:28]      Color Light Yellow / Appearance Clear / SG 1.016 / pH 6.0      Gluc 100 mg/dL / Ketone Negative  / Bili Negative / Urobili Negative       Blood Small / Protein Trace / Leuk Est Negative / Nitrite Negative      RBC 25 / WBC 5 / Hyaline  / Gran  / Sq Epi  / Non Sq Epi 1 / Bacteria Negative      Iron 17, TIBC 171, %sat 10      [05-02-22 @ 13:03]  Ferritin 1505      [05-02-22 @ 13:05]  PTH -- (Ca 9.2)      [02-05-22 @ 10:13]   294  HbA1c 6.0      [02-21-20 @ 08:53]  Lipid: chol 118, TG 54, HDL 59, LDL --      [06-27-21 @ 09:44]

## 2022-06-23 NOTE — PROGRESS NOTE ADULT - SUBJECTIVE AND OBJECTIVE BOX
Transplant Surgery Multidisciplinary Progress Note   --------------------------------------------------------------  R DCD DDRT    4/21/22    Present: Patient seen and examined with multidisciplinary team including Transplant Surgeon: Dr. Tena,  Nephrologist: Dr. Mary Lomeli/Kit, Pharmacist: WALDO Lim/Piyush PUENTE and unit RN during am rounds.  Disciplines not in attendance will be notified of the plan.     HPI: 66M with PMH: DM type II, HTN, CAD s/p CABG in 2020, AFib on Eliquis, CVA in 2019 due to Afib, Seizure d/o following CVA, last episode was on 4/8/22, h/o GIB in 2/2022 EGD with duodenal ulcer.  ESRD due to DM was on HD since 2019.     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  Donor was 58 , KDPI 82%, DCD, single vessels and ureter, HLA mismatch 1, 2, 2. No DSA, cPRA 0%. 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 Re-admitted in May for anemia in the setting of large perinephric hematoma s/p evacuation and repair of arterial anastomosis on 5/2/22. Intra operative biopsy showed no rejection, Creatinine ranging ~2mg/dL.    In Rehab: He was recently dx with klebsiella UTI rx with ertapenem - then switched to Levaquin day #6.    He also had parainfluenza pneumonia. Was at rehab progressing well.      Hospital course:  - 6/10: transferred from rehab facility for seizure status epilepticus. Intubated for respiratory protection and transferred to MICU.  EEG showed no seizure activity. Neuro consulted.   - 6/11: Extubated. Found to have R pleural effusion. s/p Thoracentesis 1.3L. Eliquis changed to heparin drip.  Post procedure drop in H&H. 5u PRBC, 2 u FFP.    - 6/11 evening: Transferred to SICU for further care in setting of hypoxia, significant R pleural effusion, hgb 6 and hemodynamic instability  - 6/11 Intubated at 9pm and sedated on Precedex.  CT placed, 600ml blood drained.  1L total overnight, started pressors  - 6/11 Received Protamine for PTT >100 (was on Heparin drip started for AF), 2 u FFP and 5u PRBC total  - 6/13 s/p VATS 2.2L old blood removed; second chest tube placed  - 6/18-19: chest tubes removed  - 6/21: PRES on MRI, off Envarsus    Interval Events:  - Afebrile, VSS on Ertapenem  - Mental status with much improvement, AAOx3  - still with global weakness, but greater at RUE  - tolerating PO  - no complaints at this time      Immunosuppression:   Induction:  Thymoglobulin                                        Maintenance immunosuppression: Belatacept #1 today, MMF 500mg BID, Pred 5  Ongoing monitoring for signs of rejection.    Potential Discharge date: pending clinical improvement  Education:  Medications  Plan of care:  See Below        MEDICATIONS  (STANDING):  apixaban 5 milliGRAM(s) Oral every 12 hours  atorvastatin 40 milliGRAM(s) Oral at bedtime  chlorhexidine 2% Cloths 1 Application(s) Topical <User Schedule>  dextrose 5%. 1000 milliLiter(s) (50 mL/Hr) IV Continuous <Continuous>  dextrose 50% Injectable 25 Gram(s) IV Push once  dextrose 50% Injectable 12.5 Gram(s) IV Push once  dextrose 50% Injectable 25 Gram(s) IV Push once  diVALproex DR 1000 milliGRAM(s) Oral two times a day  ertapenem  IVPB 1000 milliGRAM(s) IV Intermittent every 24 hours  ertapenem  IVPB      insulin glargine Injectable (LANTUS) 5 Unit(s) SubCutaneous at bedtime  insulin lispro (ADMELOG) corrective regimen sliding scale   SubCutaneous three times a day before meals  insulin lispro (ADMELOG) corrective regimen sliding scale   SubCutaneous at bedtime  insulin lispro Injectable (ADMELOG) 4 Unit(s) SubCutaneous three times a day before meals  lacosamide 100 milliGRAM(s) Oral <User Schedule>  levETIRAcetam 250 milliGRAM(s) Oral two times a day  levothyroxine 50 MICROGram(s) Oral daily  magnesium oxide 400 milliGRAM(s) Oral every 8 hours  multivitamin 1 Tablet(s) Oral daily  mycophenolate mofetil 500 milliGRAM(s) Oral <User Schedule>  NIFEdipine XL 60 milliGRAM(s) Oral daily  nystatin    Suspension 193274 Unit(s) Oral four times a day  pantoprazole    Tablet 40 milliGRAM(s) Oral before breakfast  polyethylene glycol 3350 17 Gram(s) Oral daily  predniSONE   Tablet 5 milliGRAM(s) Oral daily  senna 2 Tablet(s) Oral at bedtime  sodium bicarbonate 1300 milliGRAM(s) Oral three times a day  trimethoprim   80 mG/sulfamethoxazole 400 mG 1 Tablet(s) Oral <User Schedule>  valGANciclovir 450 milliGRAM(s) Oral <User Schedule>    MEDICATIONS  (PRN):  acetaminophen     Tablet .. 975 milliGRAM(s) Oral every 6 hours PRN Mild Pain (1 - 3)  dextrose Oral Gel 15 Gram(s) Oral once PRN Blood Glucose LESS THAN 70 milliGRAM(s)/deciliter        Vital Signs Last 24 Hrs  T(C): 36.8 (23 Jun 2022 11:33), Max: 37 (23 Jun 2022 03:19)  T(F): 98.2 (23 Jun 2022 11:33), Max: 98.6 (23 Jun 2022 03:19)  HR: 75 (23 Jun 2022 11:33) (75 - 89)  BP: 125/69 (23 Jun 2022 11:33) (119/53 - 163/72)  BP(mean): --  RR: 18 (23 Jun 2022 11:33) (18 - 18)  SpO2: 98% (23 Jun 2022 11:33) (94% - 100%)    I&O's Summary    22 Jun 2022 07:01  -  23 Jun 2022 07:00  --------------------------------------------------------  IN: 720 mL / OUT: 0 mL / NET: 720 mL                              7.2    3.97  )-----------( 281      ( 23 Jun 2022 11:01 )             23.6     06-23    138  |  101  |  41<H>  ----------------------------<  215<H>  4.4   |  23  |  1.73<H>    Ca    8.7      23 Jun 2022 06:55  Phos  3.2     06-23  Mg     1.8     06-23    TPro  6.5  /  Alb  2.7<L>  /  TBili  0.3  /  DBili  x   /  AST  21  /  ALT  10  /  AlkPhos  87  06-23          Culture - Blood (collected 06-21-22 @ 09:02)  Source: .Blood Blood-Peripheral  Preliminary Report (06-22-22 @ 14:01):    No growth to date.    Culture - Blood (collected 06-19-22 @ 06:19)  Source: .Blood Blood  Preliminary Report (06-20-22 @ 09:00):    No growth to date.            REVIEW OF SYSTEMS  --------------------------------------------------------------------------------  Gen: No weight changes, fatigue, fevers/chills, weakness  Skin: No rashes  Head/Eyes/Ears/Mouth: No headache; Normal hearing; Normal vision w/o blurriness; No sinus pain/discomfort, sore throat  Respiratory: No dyspnea, cough, wheezing, hemoptysis. Some pain at CT sites  CV: No chest pain, PND, orthopnea  GI: No abdominal pain, diarrhea, constipation, nausea, vomiting, melena, hematochezia  : No increased frequency, dysuria, hematuria, nocturia  MSK: No joint pain/swelling; no back pain; no edema  Neuro: No dizziness/lightheadedness, weakness, seizures, numbness, tingling  Heme: No easy bruising or bleeding  Endo: No heat/cold intolerance  Psych: No significant nervousness, anxiety, stress, depression  All other systems were reviewed and are negative, except as noted.    PHYSICAL EXAM:  Constitutional: Well developed / well nourished  Eyes: Anicteric, PERRLA  ENMT: nc/at  Neck: supple  Respiratory: CTA anterior. chest tubes removed, previous site with clean dressing  Cardiovascular: RRR  Gastrointestinal: Soft, non distended, NT, incision healed   Genitourinary: voiding spontaneously  Extremities: SCD's in place and working bilaterally, no edema  Vascular: Palpable dp pulses bilaterally  Neurological: A&O x3  Skin: no rashes, ulcerations or lesions  Musculoskeletal: Moving all extremities, global weakness, greatest at RUE  Psychiatric: Responsive

## 2022-06-23 NOTE — PROGRESS NOTE ADULT - ASSESSMENT
66 yr old M with Type 2 DM A1C 6.6 c/b ESRD s/p DDRT on 3/21, CVA, CAD s/p CABG, Afib on apixaban, seizure activity, HTN, HLD, h/o GI bleed in 2/2022 EGD with duodenal ulcer, discharged to [facility] rehab center after prior hospitalization, now re-admitted from [facility] for seizures c/f status epilepticus in setting of UTI, requiring intubation for hypoxic respiratory failure and admission to MICU, ccb pleural effusion c/b bleeding s/p VATS. Now extubated and transferred to medicine floor. 6/21 s/p RRT code stroke. BG tightly controlled/borderline low last night prior to bedtime. WIll adjust insulin regimen. Lantus held last night. BG goal (100-180mg/dl).

## 2022-06-23 NOTE — PROGRESS NOTE ADULT - ASSESSMENT
66 year old Male with PMHx ESRD s/p permacath removal 5/10/22 s/p Rt DDRT 4/21/22,  CVA (2019), Afib on apixaban, seizure activity, CAD s/p stents, CABG (2020), HTN, HLD, DM on insulin, gastric/duodenal ulcer, recent hospitalization 4/28/22 -5/10/22 with weakness/anemia found to have perinephric hematoma requiring evacuation and repair of bleeding arterial anastomosis who presented to [facility] for status epilepticus requiring intubation for hypoxic respiratory failure.    At [facility] was intubated on 6/10  On 6/11 underwent thoracentesis in context of Eliquis therapy  Developed bleeding into pleural space and require Chest tube placement  Planned for VATS    COVID19/FLU/RSV PCR (6/10) Negative  U/A (6/10) with 5 WBC and 25 RBC  Blood Cultures (6/10) NGTD  Pleural Fluid with 71 nucleated cells, 50% PMN, 31% Lymph.   Pleural Fluid Culture (6/11) Negative   CT Chest (6/12) with Right-sided chest tube with large hemothorax and atelectasis of the right lower lobe.    On 6/15 s/p uniportal R VATS, evacuation of hemothorax, pneumonolysis, decortication, intercostal nerve block.    CXR (6/21) with Rounded opacity in right midlung concerning for loculated pleural fluid or pneumonia  No leukocytosis, fevers or other clinical status changes  No obvious s/s of infection at present  Could represent residual hemothorax    #Residual Pleural Effusion  --Agree with reconsulting thoracic surgery  --Consider ultrasound of right effusion versus CT Chest noncontrast  --Favor monitoring off of antibiotics at present     #Positive Urine Culture (?UTI)  Of note, was being treated for UTI at rehab  UCx from 6/1 with >100K ESBL Klebsiella (Cipro R but Levofloxacin S)  He received 1-2 doses of Ertapenem and was then switched to Levofloxacin  Doubt the Levofloxacin was effective therapy given Cipro resistance  s/p Ertapenem 6/14 --> 6/18    #Renal Transplant Recipient, Prophylactic Antibiotic  --Continue Bactrim for PCP PPx  --Continue Valcyte for CMV PPx    I will follow the patient as needed. Please feel free to contact me with any further questions or concerns.    Carlton Hooevr M.D.  [facility] Division of Infectious Disease  8AM-5PM Monday - Friday: Available on [employer] Teams  After Hours and Holidays (or if no response on [employer] Teams): Please contact the Infectious Diseases Office at [phone]     The above assessment and plan were discussed with Saeid transplant surgery PA

## 2022-06-24 NOTE — PROGRESS NOTE ADULT - ASSESSMENT
66 yr old M with Type 2 DM A1C 6.6 c/b ESRD s/p DDRT on 3/21, CVA, CAD s/p CABG, Afib on apixaban, seizure activity, HTN, HLD, h/o GI bleed in 2/2022 EGD with duodenal ulcer, discharged to [facility] rehab center after prior hospitalization, now re-admitted from [facility] for seizures c/f status epilepticus in setting of UTI, requiring intubation for hypoxic respiratory failure and admission to MICU, ccb pleural effusion c/b bleeding s/p VATS. Now extubated and transferred to medicine floor. 6/21 s/p RRT code stroke. BG goal (100-180mg/dl).      Type 2 diabetes mellitus with hypoglycemia, with long-term current use of insulin.   ·  Plan: -test BG AC/HS  -FBG tightly controlled, noted 2 am BG is persistently elevated, recommend observe if pt having snack at bedtime and lower to  Lantus 4 units QHS since pt having poor po intake today 2/2 lethargy  -c/w Admelog 4 units AC meals (hold if not eating)  -if pt is having snack at bedtime consider adding admelog 1 units at bedtime for snack   -c/w Admelog low correction scale AC and Low HS scale  -cons carb diet  Discharge  - Likely discharge on basal bolus insulin, Plan TBD based on insulin requirements at [facility]. Outpatient f/u with Endocrinologist Dr. Lincoln.     Problem/Plan - 2:  ·  Problem: HTN (hypertension).   ·  Plan: BP management per primary team/neuro.     Problem/Plan - 3:  ·  Problem: Hypothyroidism.   ·  Plan: - continue LT4 50 mcg daily  - monitor TFTs outpatient.     Problem/Plan - 4:  ·  Problem: HLD (hyperlipidemia).   ·  Plan: c/w statin      Discussed with patient and primary  team Transplant WALDO Breaux   Contact via [employer] Teams during business hours  On evenings and weekends, please call [phone] or page endocrine fellow on call.   Please note that this patient may be followed by different provider tomorrow.    Time spent on encounter: 26 minutes spent on total encounter; The necessity of the time spent during the encounter on this date of service was due to development of plan of care/coordination of care/glycemic control through review of labs, blood glucose values and vital signs.

## 2022-06-24 NOTE — PROGRESS NOTE ADULT - ASSESSMENT
66M with h/o DM type II, HTN, CAD s/p CABG in 2020, Afib on Eliquis, CVA in 2019 due to Afib, Seizure d/o (last episode was on 4/8/22), h/o GI bleed in 2/2022 EGD with duodenal ulcer and ESRD on HD s/p R DDRT from DCD donor on 4/21/22 complicated by DGF requiring HD until 4/29/22 now with good graft function who presented with status epilepticus in setting of UTI/antibiotics and sub-therapeutic valproic acid level     Neuro:  - check repeat CTH today given worsening lethargy this AM  - Imaging concerning for PRES: FK discontinued; Belatacept initiated 6/24  - On Keppra, Vimpat, Depakote adjusted by Neuro  - Keep Mag > 2    R DCD DDRT (ureter stented) 4/21/22  - mild ANA, will monitor  - Strict I/Os  - Regular diet  - will remove stent prior to discharge    Immunosuppression:   - Belatacept #1 6/23. #2 on Monday.    - increase MMF to 1g BID, Pred 5  - PPX: Valcyte (MWF), completed Fluconazole, on Nystatin. on Bactrim MWF    R Pleural effusion  - resolved, chest tubes removed  - persistent RML opacity at site of prior chest tubes on daily CXRs.  Awaiting Thoracic to comment  - off ABX per Transplant ID    DM  - on Lantus/Lispro, Endocrine following     AFib:  - Anemia this AM, goal hgb of 8. 1-2U PRBC this AM  - continue Retacrit weekly  - hold home Eliquis today, restart as able    HTN:  - continue Nifedipine  - Keep SBP < 150    Dispo  - PT following: OK   - potential d/c early next week once cleared from Neuro perspective 66M with h/o DM type II, HTN, CAD s/p CABG in 2020, Afib on Eliquis, CVA in 2019 due to Afib, Seizure d/o (last episode was on 4/8/22), h/o GI bleed in 2/2022 EGD with duodenal ulcer and ESRD on HD s/p R DDRT from DCD donor on 4/21/22 complicated by DGF requiring HD until 4/29/22 now with good graft function who presented with status epilepticus in setting of UTI/antibiotics and sub-therapeutic valproic acid level     Neuro:  - repeat CTH today given worsening lethargy, unchanged, no bleed. Awaiting Neuro input  - Imaging concerning for PRES: FK discontinued; Belatacept initiated 6/24  - On Keppra, Vimpat, Depakote adjusted by Neuro  - Keep Mag > 2    R DCD DDRT (ureter stented) 4/21/22  - mild ANA, will monitor  - Strict I/Os  - Regular diet  - will remove stent prior to discharge    Immunosuppression:   - Belatacept #1 6/23. #2 on Monday.    - increase MMF to 1g BID, Pred 5  - PPX: Valcyte (MWF), completed Fluconazole, on Nystatin. on Bactrim MWF    R Pleural effusion  - resolved, chest tubes removed  - persistent RML opacity at site of prior chest tubes on daily CXRs.  Awaiting Thoracic to comment  - off ABX per Transplant ID    DM  - on Lantus/Lispro, Endocrine following     AFib:  - Anemia this AM, goal hgb of 8. 1-2U PRBC this AM  - continue Retacrit weekly  - hold home Eliquis today, restart as able    HTN:  - continue Nifedipine  - Keep SBP < 150    Dispo  - PT following: OK   - potential d/c early next week once cleared from Neuro perspective

## 2022-06-24 NOTE — CHART NOTE - NSCHARTNOTEFT_GEN_A_CORE
Neurology    Prelim EEG on 6/24 showing 9 left frontal and frontotemporal onset seizures since start of recording. Was initially planned to taper the valproic acid to switch to vimpat as patient was on previously but even prior to tapering, was found on EEG with seizures. Will discontinue keppra, rebolus valproic acid at 1500mg x1 IV, then continue valproic acid at 750mg q8hrs IV. Convert vimpat to 100mg BID IV. Check level in 2 hours after bolus (to see level response), and check level prior to the AM dose. **Will determine tonight when to start the 750mg IV dose of vimpat after the bolus based on the patient's level that is obtained after the bolus.    Recommendations discussed w/ epilepsy fellow Dr Donohue Neurology    Prelim EEG on 6/24 showing 9 left frontal and frontotemporal onset seizures since start of recording. Was initially planned to taper the valproic acid to switch to vimpat as patient was on previously but even prior to tapering, was found on EEG with seizures. Will discontinue keppra, rebolus valproic acid at 1500mg x1 IV, then continue valproic acid at 1000mg q8hrs IV. Convert vimpat to 100mg BID IV. Check level in 2 hours after bolus (to see level response), and check level prior to the AM dose. **Will determine tonight when to start the 1000mg IV dose of  VPA after the bolus based on the patient's level that is obtained after the bolus.    Recommendations discussed w/ epilepsy fellow Dr Donohue

## 2022-06-24 NOTE — PROGRESS NOTE ADULT - ATTENDING COMMENTS
Patient with worsening mental status. At bedside is confused but awake, follows some commands, no clinical activity noted at bedside. Pt later hooked up to EEG which showed 9 electrographic seizures coming left frontotemporal region. Pt is back in status epilepticus. Would not proceed with titrating down AEDs, continue VPA at 1g BID. Continue VEEG. consider consulting ICU.

## 2022-06-24 NOTE — PROVIDER CONTACT NOTE (OTHER) - ASSESSMENT
PT. A&Ox2, VS as charted. Pt. denies headache, c/p. Pt. A&Ox2, VS as charted. Pt. denies headache, c/p.

## 2022-06-24 NOTE — PROGRESS NOTE ADULT - SUBJECTIVE AND OBJECTIVE BOX
[facility] DIVISION OF KIDNEY DISEASES AND HYPERTENSION -- FOLLOW UP NOTE  --------------------------------------------------------------------------------  HPI:  66 yr old man with ESRD due to DM was on HD since 2019, s/p DCD DDRT on 4/21/22. Donor was 58 , KDPI 82%, DCD, single vessels and ureter, HLA mismatch 1, 2, 2. No DSA, cPRA 0%. 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 Readmitted in May for anemia in the setting of large perinephric hematoma s/p evacuation and repair of arterial anastomosis on 5/2/22. Intra operative biopsy showed no rejection, Creatinine ranging 2mg/dL. He was recently dx with klebsiella UTI rx with ertapenem - then switched to Levaquin day #6. He also had parainfluenza pneumonia. Was at rehab progressing well. Presented with status epilepticus. Intubated for respiratory protection. Right pleural effusion drained 1300ml. Transfused 1 unit PRBC. Pt. transferred to SICU and was transfused 4 additional units for a total of 6 units.     Pt. seen this AM. More somnolent today, Unable to awake even with sternal rub. HgB decreased. Reached out to Neurology for reccomendations. BP elevated overnight. medications increased.     PAST HISTORY  --------------------------------------------------------------------------------  No significant changes to PMH, PSH, FHx, SHx, unless otherwise noted    ALLERGIES & MEDICATIONS  --------------------------------------------------------------------------------  Allergies    No Known Allergies    Intolerances      Standing Inpatient Medications  atorvastatin 40 milliGRAM(s) Oral at bedtime  chlorhexidine 2% Cloths 1 Application(s) Topical <User Schedule>  dextrose 5%. 1000 milliLiter(s) IV Continuous <Continuous>  dextrose 50% Injectable 25 Gram(s) IV Push once  dextrose 50% Injectable 12.5 Gram(s) IV Push once  dextrose 50% Injectable 25 Gram(s) IV Push once  diVALproex DR 1000 milliGRAM(s) Oral two times a day  epoetin cortney-epbx (RETACRIT) Injectable 65665 Unit(s) SubCutaneous every 7 days  insulin glargine Injectable (LANTUS) 5 Unit(s) SubCutaneous at bedtime  insulin lispro (ADMELOG) corrective regimen sliding scale   SubCutaneous three times a day before meals  insulin lispro (ADMELOG) corrective regimen sliding scale   SubCutaneous at bedtime  insulin lispro Injectable (ADMELOG) 4 Unit(s) SubCutaneous three times a day before meals  labetalol Injectable 5 milliGRAM(s) IV Push once  lacosamide 100 milliGRAM(s) Oral <User Schedule>  levETIRAcetam 250 milliGRAM(s) Oral two times a day  levothyroxine 50 MICROGram(s) Oral daily  magnesium oxide 400 milliGRAM(s) Oral every 8 hours  multivitamin 1 Tablet(s) Oral daily  mycophenolate mofetil 1000 milliGRAM(s) Oral <User Schedule>  mycophenolate mofetil 1000 milliGRAM(s) Oral once  NIFEdipine XL 60 milliGRAM(s) Oral daily  nystatin    Suspension 206798 Unit(s) Oral four times a day  pantoprazole    Tablet 40 milliGRAM(s) Oral before breakfast  polyethylene glycol 3350 17 Gram(s) Oral daily  predniSONE   Tablet 5 milliGRAM(s) Oral daily  senna 2 Tablet(s) Oral at bedtime  trimethoprim   80 mG/sulfamethoxazole 400 mG 1 Tablet(s) Oral <User Schedule>  valGANciclovir 450 milliGRAM(s) Oral <User Schedule>    PRN Inpatient Medications  acetaminophen     Tablet .. 975 milliGRAM(s) Oral every 6 hours PRN  dextrose Oral Gel 15 Gram(s) Oral once PRN      REVIEW OF SYSTEMS  --------------------------------------------------------------------------------  Unable to obtain    VITALS/PHYSICAL EXAM  --------------------------------------------------------------------------------  T(C): 36.6 (06-24-22 @ 12:05), Max: 36.6 (06-24-22 @ 05:00)  HR: 68 (06-24-22 @ 12:05) (60 - 80)  BP: 136/57 (06-24-22 @ 12:05) (117/64 - 159/66)  RR: 18 (06-24-22 @ 12:05) (18 - 18)  SpO2: 97% (06-24-22 @ 12:05) (95% - 98%)  Wt(kg): --        06-23-22 @ 07:01  -  06-24-22 @ 07:00  --------------------------------------------------------  IN: 1010 mL / OUT: 510 mL / NET: 500 mL    Physical Exam:  	Gen: Somnolent   	HEENT: MMM   	Pulm: CTAB anteriorly  	CV: S1S2+  	Abd: +BS, soft, non-tender          Transplant: No tenderness, swelling  	: No suprapubic tenderness, external catheter  	MSK: no LE edema, some RUE edema.   	Psych: Somnolent   	Skin: Warm          Access: Peripheral       LABS/STUDIES  --------------------------------------------------------------------------------              6.7    3.66  >-----------<  286      [06-24-22 @ 06:24]              22.1     138  |  102  |  47  ----------------------------<  167      [06-24-22 @ 06:24]  4.3   |  24  |  1.96        Ca     8.5     [06-24-22 @ 06:24]      Mg     2.2     [06-24-22 @ 06:24]      Phos  3.1     [06-24-22 @ 06:24]    TPro  5.9  /  Alb  2.4  /  TBili  0.2  /  DBili  x   /  AST  18  /  ALT  10  /  AlkPhos  84  [06-24-22 @ 06:24]    PT/INR: PT 17.3 , INR 1.49       [06-24-22 @ 06:24]  PTT: 34.7       [06-24-22 @ 06:24]      Creatinine Trend:  SCr 1.96 [06-24 @ 06:24]  SCr 1.73 [06-23 @ 06:55]  SCr 1.75 [06-22 @ 06:05]  SCr 1.46 [06-21 @ 08:58]  SCr 1.53 [06-21 @ 06:16]    Tacrolimus (), Serum: 4.6 ng/mL (06-21 @ 06:16)  Tacrolimus (), Serum: 5.2 ng/mL (06-20 @ 06:26)  Tacrolimus (), Serum: 2.8 ng/mL (06-19 @ 06:19)  Tacrolimus (), Serum: 6.3 ng/mL (06-18 @ 06:49)            Urinalysis - [06-10-22 @ 22:28]      Color Light Yellow / Appearance Clear / SG 1.016 / pH 6.0      Gluc 100 mg/dL / Ketone Negative  / Bili Negative / Urobili Negative       Blood Small / Protein Trace / Leuk Est Negative / Nitrite Negative      RBC 25 / WBC 5 / Hyaline  / Gran  / Sq Epi  / Non Sq Epi 1 / Bacteria Negative      Iron 17, TIBC 171, %sat 10      [05-02-22 @ 13:03]  Ferritin 1505      [05-02-22 @ 13:05]  PTH -- (Ca 9.2)      [02-05-22 @ 10:13]   294  HbA1c 6.0      [02-21-20 @ 08:53]  Lipid: chol 118, TG 54, HDL 59, LDL --      [06-27-21 @ 09:44]

## 2022-06-24 NOTE — PROGRESS NOTE ADULT - SUBJECTIVE AND OBJECTIVE BOX
[facility]   MD NINA MASON MD, PA KRISTINE SOLTANPOUR, DO INJUNG KO, NP    TEL:  OFFICE: [phone]    From 5pm-7am Answering Service [phone]    -- RENAL FOLLOW UP NOTE ---Date of Service 06-24-22 @ 13:15    Patient is a 66y old  Male who presents with a chief complaint of Status Epilepticus (24 Jun 2022 12:59)      Patient seen and examined at bedside.     VITALS:  T(F): 97.9 (06-24-22 @ 12:05), Max: 97.9 (06-24-22 @ 12:05)  HR: 68 (06-24-22 @ 12:05)  BP: 136/57 (06-24-22 @ 12:05)  RR: 18 (06-24-22 @ 12:05)  SpO2: 97% (06-24-22 @ 12:05)  Wt(kg): --    06-23 @ 07:01  -  06-24 @ 07:00  --------------------------------------------------------  IN: 1010 mL / OUT: 510 mL / NET: 500 mL          PHYSICAL EXAM:  Constitutional: NAD  Neck: No JVD  Respiratory: CTAB, no wheezes, rales or rhonchi  Cardiovascular: S1, S2, RRR  Gastrointestinal: BS+, soft, NT/ND  Extremities: No peripheral edema    Hospital Medications:   MEDICATIONS  (STANDING):  atorvastatin 40 milliGRAM(s) Oral at bedtime  chlorhexidine 2% Cloths 1 Application(s) Topical <User Schedule>  dextrose 5%. 1000 milliLiter(s) (50 mL/Hr) IV Continuous <Continuous>  dextrose 50% Injectable 25 Gram(s) IV Push once  dextrose 50% Injectable 12.5 Gram(s) IV Push once  dextrose 50% Injectable 25 Gram(s) IV Push once  diVALproex DR 1000 milliGRAM(s) Oral two times a day  epoetin cortney-epbx (RETACRIT) Injectable 36673 Unit(s) SubCutaneous every 7 days  insulin glargine Injectable (LANTUS) 5 Unit(s) SubCutaneous at bedtime  insulin lispro (ADMELOG) corrective regimen sliding scale   SubCutaneous three times a day before meals  insulin lispro (ADMELOG) corrective regimen sliding scale   SubCutaneous at bedtime  insulin lispro Injectable (ADMELOG) 4 Unit(s) SubCutaneous three times a day before meals  labetalol Injectable 5 milliGRAM(s) IV Push once  lacosamide 100 milliGRAM(s) Oral <User Schedule>  levETIRAcetam 250 milliGRAM(s) Oral two times a day  levothyroxine 50 MICROGram(s) Oral daily  magnesium oxide 400 milliGRAM(s) Oral every 8 hours  multivitamin 1 Tablet(s) Oral daily  mycophenolate mofetil 1000 milliGRAM(s) Oral <User Schedule>  NIFEdipine XL 60 milliGRAM(s) Oral daily  nystatin    Suspension 735390 Unit(s) Oral four times a day  pantoprazole    Tablet 40 milliGRAM(s) Oral before breakfast  polyethylene glycol 3350 17 Gram(s) Oral daily  predniSONE   Tablet 5 milliGRAM(s) Oral daily  senna 2 Tablet(s) Oral at bedtime  trimethoprim   80 mG/sulfamethoxazole 400 mG 1 Tablet(s) Oral <User Schedule>  valGANciclovir 450 milliGRAM(s) Oral <User Schedule>      LABS:  06-24    138  |  102  |  47<H>  ----------------------------<  167<H>  4.3   |  24  |  1.96<H>    Ca    8.5      24 Jun 2022 06:24  Phos  3.1     06-24  Mg     2.2     06-24    TPro  5.9<L>  /  Alb  2.4<L>  /  TBili  0.2  /  DBili      /  AST  18  /  ALT  10  /  AlkPhos  84  06-24    Creatinine Trend: 1.96 <--, 1.73 <--, 1.75 <--, 1.46 <--, 1.53 <--, 1.54 <--, 1.68 <--, 1.76 <--    Albumin, Serum: 2.4 g/dL (06-24 @ 06:24)  Phosphorus Level, Serum: 3.1 mg/dL (06-24 @ 06:24)                              6.7    3.66  )-----------( 286      ( 24 Jun 2022 06:24 )             22.1     Urine Studies:  Urinalysis - [06-10-22 @ 22:28]      Color Light Yellow / Appearance Clear / SG 1.016 / pH 6.0      Gluc 100 mg/dL / Ketone Negative  / Bili Negative / Urobili Negative       Blood Small / Protein Trace / Leuk Est Negative / Nitrite Negative      RBC 25 / WBC 5 / Hyaline  / Gran  / Sq Epi  / Non Sq Epi 1 / Bacteria Negative      Iron 17, TIBC 171, %sat 10      [05-02-22 @ 13:03]  Ferritin 1505      [05-02-22 @ 13:05]  PTH -- (Ca 9.2)      [02-05-22 @ 10:13]   294  HbA1c 6.0      [02-21-20 @ 08:53]  Lipid: chol 118, TG 54, HDL 59, LDL --      [06-27-21 @ 09:44]        RADIOLOGY & ADDITIONAL STUDIES:

## 2022-06-24 NOTE — PROGRESS NOTE ADULT - PROBLEM SELECTOR PLAN 1
Finding from recent chest xray demonstrating loculated fluid in the fissures reviewed w/ Dr Cardenas. No further thoracic surgical intervention planned. Further management per primary team.  If for any circumstances patient decompensates thoracic surgery is available for assistance Welcome to re-consult.  [id number]

## 2022-06-24 NOTE — PROGRESS NOTE ADULT - ASSESSMENT
66 year old Male with PMHx ESRD s/p permacath removal 5/10/22 s/p Rt DDRT 4/21/22,  CVA (2019), Afib on apixaban, seizure activity, CAD s/p stents, CABG (2020), HTN, HLD, DM on insulin, gastric/duodenal ulcer, recent hospitalization 4/28/22 -5/10/22 with weakness/anemia found to have perinephric hematoma requiring evacuation and repair of bleeding arterial anastomosis who presented to [facility] for status epilepticus requiring intubation for hypoxic respiratory failure.    At [facility] was intubated on 6/10  On 6/11 underwent thoracentesis in context of Eliquis therapy  Developed bleeding into pleural space and require Chest tube placement  Planned for VATS    COVID19/FLU/RSV PCR (6/10) Negative  U/A (6/10) with 5 WBC and 25 RBC  Blood Cultures (6/10) NGTD  Pleural Fluid with 71 nucleated cells, 50% PMN, 31% Lymph.   Pleural Fluid Culture (6/11) Negative   CT Chest (6/12) with Right-sided chest tube with large hemothorax and atelectasis of the right lower lobe.    On 6/15 s/p uniportal R VATS, evacuation of hemothorax, pneumonolysis, decortication, intercostal nerve block.    CXR (6/21) with Rounded opacity in right midlung concerning for loculated pleural fluid or pneumonia  No leukocytosis, fevers or other clinical status changes  No obvious s/s of infection at present  Could represent residual hemothorax    Intermittent lethargic  MRI Brain (6/20) worrisome for PRES    #Residual Pleural Effusion  Thoracic Surgery input noted; no plans for further intervention  --Consider ultrasound of right effusion versus CT Chest noncontrast  --Favor monitoring off of antibiotics at present   --Follow WBC trend  --Follow temperature curve     #Positive Urine Culture (?UTI)  Of note, was being treated for UTI at rehab  UCx from 6/1 with >100K ESBL Klebsiella (Cipro R but Levofloxacin S)  He received 1-2 doses of Ertapenem and was then switched to Levofloxacin  Doubt the Levofloxacin was effective therapy given Cipro resistance  s/p Ertapenem 6/14 --> 6/18    #Renal Transplant Recipient, Prophylactic Antibiotic  --Continue Bactrim for PCP PPx  --Continue Valcyte for CMV PPx    I will continue to follow. Please feel free to contact me with any further questions.    Carlton Hoover M.D.  [facility] Division of Infectious Disease  8AM-5PM Monday - Friday: Available on [employer] Teams  After Hours and Holidays (or if no response on [employer] Teams): Please contact the Infectious Diseases Office at [phone]    The above assessment and plan were discussed with Saeid transplant surgery PA

## 2022-06-24 NOTE — PROGRESS NOTE ADULT - SUBJECTIVE AND OBJECTIVE BOX
Follow Up: Abnormal XR    Interval History: afebrile. lethargic this AM but more alert this PM. Denies cough. Denies back pain. No chills    REVIEW OF SYSTEMS  [  ] ROS unobtainable because:    [ x ] All other systems negative except as noted below    Constitutional:  [ ] fever [ ] chills  [ ] weight loss  [ ] weakness  Skin:  [ ] rash [ ] phlebitis	  Eyes: [ ] icterus [ ] pain  [ ] discharge	  ENMT: [ ] sore throat  [ ] thrush [ ] ulcers [ ] exudates  Respiratory: [ ] dyspnea [ ] hemoptysis [ ] cough [ ] sputum	  Cardiovascular:  [ ] chest pain [ ] palpitations [ ] edema	  Gastrointestinal:  [ ] nausea [ ] vomiting [ ] diarrhea [ ] constipation [ ] pain	  Genitourinary:  [ ] dysuria [ ] frequency [ ] hematuria [ ] discharge [ ] flank pain  [ ] incontinence  Musculoskeletal:  [ ] myalgias [ ] arthralgias [ ] arthritis  [ ] back pain  Neurological:  [ ] headache [ ] seizures  [ ] confusion/altered mental status +sleepy    Allergies  No Known Allergies        ANTIMICROBIALS:  nystatin    Suspension 347499 four times a day  trimethoprim   80 mG/sulfamethoxazole 400 mG 1 <User Schedule>  valGANciclovir 450 <User Schedule>      OTHER MEDS:  MEDICATIONS  (STANDING):  acetaminophen     Tablet .. 975 every 6 hours PRN  apixaban 5 every 12 hours  atorvastatin 40 at bedtime  dextrose 50% Injectable 25 once  dextrose 50% Injectable 12.5 once  dextrose 50% Injectable 25 once  dextrose Oral Gel 15 once PRN  diVALproex  once  epoetin cortney-epbx (RETACRIT) Injectable 72142 every 7 days  insulin glargine Injectable (LANTUS) 4 at bedtime  insulin lispro (ADMELOG) corrective regimen sliding scale  three times a day before meals  insulin lispro (ADMELOG) corrective regimen sliding scale  at bedtime  insulin lispro Injectable (ADMELOG) 4 three times a day before meals  labetalol Injectable 5 once  lacosamide 100 <User Schedule>  levETIRAcetam 250 two times a day  levothyroxine 50 daily  mycophenolate mofetil 1000 <User Schedule>  NIFEdipine XL 60 daily  pantoprazole    Tablet 40 before breakfast  polyethylene glycol 3350 17 daily  predniSONE   Tablet 5 daily  senna 2 at bedtime      Vital Signs Last 24 Hrs  T(C): 36.6 (24 Jun 2022 12:05), Max: 36.6 (24 Jun 2022 05:00)  T(F): 97.9 (24 Jun 2022 12:05), Max: 97.9 (24 Jun 2022 12:05)  HR: 68 (24 Jun 2022 12:05) (60 - 80)  BP: 136/57 (24 Jun 2022 12:05) (117/64 - 159/66)  BP(mean): --  RR: 18 (24 Jun 2022 12:05) (18 - 18)  SpO2: 97% (24 Jun 2022 12:05) (95% - 98%)    PHYSICAL EXAMINATION:  General: Alert and Awake, NAD  HEENT: PERRL, EOMI  Cardiac: RRR, No M/R/G  Resp: CTAB, No Wh/Rh/Ra  Abdomen: NBS, NT/ND, No HSM, No rigidity or guarding  MSK: No LE edema. No Calf tenderness  : + tucker  Skin: No rashes or lesions. Skin is warm and dry to the touch.   Neuro: Alert and Awake. CN 2-12 Grossly intact. Moves all four extremities spontaneously.  Psych: Calm, Pleasant, Cooperative                          9.1    4.61  )-----------( 332      ( 24 Jun 2022 13:35 )             28.6       06-24    138  |  102  |  47<H>  ----------------------------<  167<H>  4.3   |  24  |  1.96<H>    Ca    8.5      24 Jun 2022 06:24  Phos  3.1     06-24  Mg     2.2     06-24    TPro  5.9<L>  /  Alb  2.4<L>  /  TBili  0.2  /  DBili  x   /  AST  18  /  ALT  10  /  AlkPhos  84  06-24          MICROBIOLOGY:  v  .Blood Blood-Peripheral  06-21-22   No growth to date.  --  --      .Blood Blood  06-19-22   No Growth Final  --  --      .Body Fluid Pleural Fluid  06-11-22   No growth at 5 days  --    Few polymorphonuclear leukocytes seen  No organisms seen      .Blood Blood  06-10-22   No Growth Final  --  --      .Blood Blood  06-10-22   No Growth Final  --  --    RADIOLOGY:    <The imaging below has been reviewed and visualized by me independently. Findings as detailed in report below>    < from: [facility] Duplex Upper Ext Vein Scan, Right (06.24.22 @ 14:19) >  IMPRESSION:  Acute DVT is identified in the right internal jugular vein.    Superficial vein thrombus is seen in the right cephalic and basilic veins.    WALDO Valverde was informed of the findings at 6/24/2022 at 2:15 PM    < end of copied text >

## 2022-06-24 NOTE — PROGRESS NOTE ADULT - SUBJECTIVE AND OBJECTIVE BOX
Neurology Progress Note    SUBJECTIVE/OBJECTIVE/INTERVAL EVENTS: Patient seen and examined at bedside w/ neuro attending and team. He is lethargic rather than somnolent. Appears more delirious on exam likely toxic/metabolic in etiology than stroke/seizure related. More confused regarding orientation questions. Otherwise denies headaches, dizziness, nausea, vomiting, new focal numbness/ weakness.      VITALS & EXAMINATION:  Vital Signs Last 24 Hrs  T(C): 36.6 (24 Jun 2022 12:05), Max: 36.6 (24 Jun 2022 05:00)  T(F): 97.9 (24 Jun 2022 12:05), Max: 97.9 (24 Jun 2022 12:05)  HR: 68 (24 Jun 2022 12:05) (60 - 80)  BP: 136/57 (24 Jun 2022 12:05) (117/64 - 159/66)  BP(mean): --  RR: 18 (24 Jun 2022 12:05) (18 - 18)  SpO2: 97% (24 Jun 2022 12:05) (95% - 98%)     Physical Examination:  General: Laying in bed, mildly underweight, tired appearing  Head: normocephalic  Eyes: clear sclera  Neck: no stiff neck  Respiratory: in no acute respiratory distress  Skin: well perfused    Neurologic:  - Mental Status: Awake with eyes open but lethargic, not oriented to place, but is to person, not situation  - Speech is mildly hypophonic and slowed with intermittent pauses. Fluent. Appears intact comprehension; Follows few simple commands although occasionally requires repetition to complete task.  - Cranial Nerves: PERRL w/ no sluggishness, no fixed gaze, VFF to confrontation b/l. EOMI w/o nystagmus. No ezio facial asymmetry.   - Motor: Increased extremity tone RUE > LUE and also increased moderately in b/l LE. RUE 4/5, LUE 4+/5 shoulder flex, b/l LE 3/5 proximally   - Reflexes: 2+ and symmetric at the biceps, 3+ knees L>R. Toes downgoing R mute L.   - Sensory: Intact throughout to noxious stimuli  - Coordination: cannot assess  - Gait: cannot assess    LABORATORY:  CBC                       9.1    4.61  )-----------( 332      ( 24 Jun 2022 13:35 )             28.6     Chem 06-24    138  |  102  |  47<H>  ----------------------------<  167<H>  4.3   |  24  |  1.96<H>    Ca    8.5      24 Jun 2022 06:24  Phos  3.1     06-24  Mg     2.2     06-24    TPro  5.9<L>  /  Alb  2.4<L>  /  TBili  0.2  /  DBili  x   /  AST  18  /  ALT  10  /  AlkPhos  84  06-24    LFTs LIVER FUNCTIONS - ( 24 Jun 2022 06:24 )  Alb: 2.4 g/dL / Pro: 5.9 g/dL / ALK PHOS: 84 U/L / ALT: 10 U/L / AST: 18 U/L / GGT: x           Coagulopathy PT/INR - ( 24 Jun 2022 06:24 )   PT: 17.3 sec;   INR: 1.49 ratio      PTT - ( 24 Jun 2022 06:24 )  PTT:34.7 sec  Lipid Panel   A1c   Cardiac enzymes     U/A   CSF  Immunological  Other    STUDIES & IMAGING: (EEG, CT, MR, U/S, TTE/DINAH):    < from: CT Head No Cont (06.24.22 @ 10:29) >    ACC: [id number] EXAM:  CT BRAIN                          PROCEDURE DATE:  06/24/2022      INTERPRETATION:  .    CLINICAL INFORMATION: Worsening lethargy. Altered mental status.    TECHNIQUE: Multiple axial CT images of the head were obtained without   contrast. Sagittal and coronal reconstructed images were acquired from   the source data.    COMPARISON: Prior head CT study from 6/21/2022. Prior brain MRI study   from 6/20/2022.    FINDINGS: Extensive encephalomalacia and gliosis are again noted within   the high bilateral cerebral hemispheres.. Overall appearance is unchanged.    There is no acute intracranial hemorrhage, mass effect, shift of the   midline structures, herniation, hydrocephalus, abnormal extra-axial fluid   collection.    There is diffuse cerebral volume loss with prominence of the sulci,   fissures, and cisternal spaces which is normal for the patient's age.   There is moderate patchy confluent periventricular white matter   hypoattenuation statistically compatible with microvascular changes given   calcific atherosclerotic disease of the intracranial arteries.    The paranasal sinuses and mastoid air cells are clear. The calvarium   appears intact. There is evidence of bilateral cataract removal.    IMPRESSION:No acute intracranial hemorrhage.    Similar-appearing extensive encephalomalacia and gliosis within the   bilateral high cerebral hemispheres.    Similar-appearing moderate severity chronic white matter microvascular   type changes.    --- End of Report ---    GISELLA PARKER MD; Attending Radiologist  This document has been electronically signed. Jun 24 2022 10:45AM    < end of copied text >   Neurology Progress Note    SUBJECTIVE/OBJECTIVE/INTERVAL EVENTS: Patient seen and examined at bedside w/ neuro attending and team. He is lethargic rather than somnolent. Appears more delirious on exam. More confused regarding orientation questions. Otherwise denies headaches, dizziness, nausea, vomiting, new focal numbness/ weakness.      VITALS & EXAMINATION:  Vital Signs Last 24 Hrs  T(C): 36.6 (24 Jun 2022 12:05), Max: 36.6 (24 Jun 2022 05:00)  T(F): 97.9 (24 Jun 2022 12:05), Max: 97.9 (24 Jun 2022 12:05)  HR: 68 (24 Jun 2022 12:05) (60 - 80)  BP: 136/57 (24 Jun 2022 12:05) (117/64 - 159/66)  BP(mean): --  RR: 18 (24 Jun 2022 12:05) (18 - 18)  SpO2: 97% (24 Jun 2022 12:05) (95% - 98%)     Physical Examination:  General: Laying in bed, mildly underweight, tired appearing  Head: normocephalic  Eyes: clear sclera  Neck: no stiff neck  Respiratory: in no acute respiratory distress  Skin: well perfused    Neurologic:  - Mental Status: Awake with eyes open but lethargic, not oriented to place, but is to person, not situation  - Speech is mildly hypophonic and slowed with intermittent pauses. Fluent. Appears intact comprehension; Follows few simple commands although occasionally requires repetition to complete task.  - Cranial Nerves: PERRL w/ no sluggishness, no fixed gaze, VFF to confrontation b/l. EOMI w/o nystagmus. No ezio facial asymmetry.   - Motor: Increased extremity tone RUE > LUE and also increased moderately in b/l LE. RUE 4/5, LUE 4+/5 shoulder flex, b/l LE 3/5 proximally   - Reflexes: 2+ and symmetric at the biceps, 3+ knees L>R. Toes downgoing R mute L.   - Sensory: Intact throughout to noxious stimuli  - Coordination: cannot assess  - Gait: cannot assess    LABORATORY:  CBC                       9.1    4.61  )-----------( 332      ( 24 Jun 2022 13:35 )             28.6     Chem 06-24    138  |  102  |  47<H>  ----------------------------<  167<H>  4.3   |  24  |  1.96<H>    Ca    8.5      24 Jun 2022 06:24  Phos  3.1     06-24  Mg     2.2     06-24    TPro  5.9<L>  /  Alb  2.4<L>  /  TBili  0.2  /  DBili  x   /  AST  18  /  ALT  10  /  AlkPhos  84  06-24    LFTs LIVER FUNCTIONS - ( 24 Jun 2022 06:24 )  Alb: 2.4 g/dL / Pro: 5.9 g/dL / ALK PHOS: 84 U/L / ALT: 10 U/L / AST: 18 U/L / GGT: x           Coagulopathy PT/INR - ( 24 Jun 2022 06:24 )   PT: 17.3 sec;   INR: 1.49 ratio      PTT - ( 24 Jun 2022 06:24 )  PTT:34.7 sec  Lipid Panel   A1c   Cardiac enzymes     U/A   CSF  Immunological  Other    STUDIES & IMAGING: (EEG, CT, MR, U/S, TTE/DINAH):    < from: CT Head No Cont (06.24.22 @ 10:29) >    ACC: [id number] EXAM:  CT BRAIN                          PROCEDURE DATE:  06/24/2022      INTERPRETATION:  .    CLINICAL INFORMATION: Worsening lethargy. Altered mental status.    TECHNIQUE: Multiple axial CT images of the head were obtained without   contrast. Sagittal and coronal reconstructed images were acquired from   the source data.    COMPARISON: Prior head CT study from 6/21/2022. Prior brain MRI study   from 6/20/2022.    FINDINGS: Extensive encephalomalacia and gliosis are again noted within   the high bilateral cerebral hemispheres.. Overall appearance is unchanged.    There is no acute intracranial hemorrhage, mass effect, shift of the   midline structures, herniation, hydrocephalus, abnormal extra-axial fluid   collection.    There is diffuse cerebral volume loss with prominence of the sulci,   fissures, and cisternal spaces which is normal for the patient's age.   There is moderate patchy confluent periventricular white matter   hypoattenuation statistically compatible with microvascular changes given   calcific atherosclerotic disease of the intracranial arteries.    The paranasal sinuses and mastoid air cells are clear. The calvarium   appears intact. There is evidence of bilateral cataract removal.    IMPRESSION:No acute intracranial hemorrhage.    Similar-appearing extensive encephalomalacia and gliosis within the   bilateral high cerebral hemispheres.    Similar-appearing moderate severity chronic white matter microvascular   type changes.    --- End of Report ---    GISELLA PARKER MD; Attending Radiologist  This document has been electronically signed. Jun 24 2022 10:45AM    < end of copied text >

## 2022-06-24 NOTE — CHART NOTE - NSCHARTNOTEFT_GEN_A_CORE
Transplant Surgery    Notified by Epilepsy team of +seizure activity on EEG.    Discussed with Neurology:    -d/c Keppra  -start IV Vimpat BID  -administer loading dose of Depakote 1500mg, followed by 750mg q8H dosing  -PM Depakote level prior to 10P dose  -AM Depakote levels    Seen with son at bedside.  Pt alert, responsive and seen tolerating dinner.  No motor deficits or clinical signs of seizure seen.    Periods of lethargy may explain seizure activity seen  No hemodynamic instability at this time, but will transfer to SICU with any concerns.    Discussed plan with son, RN and Dr. Tena

## 2022-06-24 NOTE — PROGRESS NOTE ADULT - SUBJECTIVE AND OBJECTIVE BOX
Transplant Surgery Multidisciplinary Progress Note   --------------------------------------------------------------  R DCD DDRT    4/21/22    Present: Patient seen and examined with multidisciplinary team including Transplant Surgeon: Dr. Tena,  Nephrologist: Dr. Mary Lomeli/Kit, Pharmacist: WALDO Lim and unit RN during am rounds.  Disciplines not in attendance will be notified of the plan.     HPI: 66M with PMH: DM type II, HTN, CAD s/p CABG in 2020, AFib on Eliquis, CVA in 2019 due to Afib, Seizure d/o following CVA, last episode was on 4/8/22, h/o GIB in 2/2022 EGD with duodenal ulcer.  ESRD due to DM was on HD since 2019.     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  Donor was 58 , KDPI 82%, DCD, single vessels and ureter, HLA mismatch 1, 2, 2. No DSA, cPRA 0%. 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 Re-admitted in May for anemia in the setting of large perinephric hematoma s/p evacuation and repair of arterial anastomosis on 5/2/22. Intra operative biopsy showed no rejection, Creatinine ranging ~2mg/dL.    In Rehab: He was recently dx with klebsiella UTI rx with ertapenem - then switched to Levaquin day #6.    He also had parainfluenza pneumonia. Was at rehab progressing well.      Hospital course:  - 6/10: transferred from rehab facility for seizure status epilepticus. Intubated for respiratory protection and transferred to MICU.  EEG showed no seizure activity. Neuro consulted.   - 6/11: Extubated. Found to have R pleural effusion. s/p Thoracentesis 1.3L. Eliquis changed to heparin drip.  Post procedure drop in H&H. 5u PRBC, 2 u FFP.    - 6/11 evening: Transferred to SICU for further care in setting of hypoxia, significant R pleural effusion, hgb 6 and hemodynamic instability  - 6/11 Intubated at 9pm and sedated on Precedex.  CT placed, 600ml blood drained.  1L total overnight, started pressors  - 6/11 Received Protamine for PTT >100 (was on Heparin drip started for AF), 2 u FFP and 5u PRBC total  - 6/13 s/p VATS 2.2L old blood removed; second chest tube placed  - 6/18-19: chest tubes removed  - 6/21: PRES on MRI, off Envarsus    Interval Events:  - Afebrile, VSS on Ertapenem  - More lethargic this AM, difficult to arouse, reacts to painful stimuli. not responsive to questions  - still with global weakness. RUE edema appears worse this AM  - tolerating PO, having bowel function      Immunosuppression:   Induction:  Thymoglobulin                                        Maintenance immunosuppression: Belatacept #1 6/23, MMF 1g BID, Pred 5  Ongoing monitoring for signs of rejection.    Potential Discharge date: pending clinical improvement  Education:  Medications  Plan of care:  See Below      MEDICATIONS  (STANDING):  atorvastatin 40 milliGRAM(s) Oral at bedtime  chlorhexidine 2% Cloths 1 Application(s) Topical <User Schedule>  dextrose 5%. 1000 milliLiter(s) (50 mL/Hr) IV Continuous <Continuous>  dextrose 50% Injectable 25 Gram(s) IV Push once  dextrose 50% Injectable 12.5 Gram(s) IV Push once  dextrose 50% Injectable 25 Gram(s) IV Push once  diVALproex DR 1000 milliGRAM(s) Oral two times a day  epoetin cortney-epbx (RETACRIT) Injectable 71975 Unit(s) SubCutaneous every 7 days  insulin glargine Injectable (LANTUS) 5 Unit(s) SubCutaneous at bedtime  insulin lispro (ADMELOG) corrective regimen sliding scale   SubCutaneous three times a day before meals  insulin lispro (ADMELOG) corrective regimen sliding scale   SubCutaneous at bedtime  insulin lispro Injectable (ADMELOG) 4 Unit(s) SubCutaneous three times a day before meals  labetalol Injectable 5 milliGRAM(s) IV Push once  lacosamide 100 milliGRAM(s) Oral <User Schedule>  levETIRAcetam 250 milliGRAM(s) Oral two times a day  levothyroxine 50 MICROGram(s) Oral daily  magnesium oxide 400 milliGRAM(s) Oral every 8 hours  multivitamin 1 Tablet(s) Oral daily  mycophenolate mofetil 1000 milliGRAM(s) Oral <User Schedule>  NIFEdipine XL 60 milliGRAM(s) Oral daily  nystatin    Suspension 772405 Unit(s) Oral four times a day  pantoprazole    Tablet 40 milliGRAM(s) Oral before breakfast  polyethylene glycol 3350 17 Gram(s) Oral daily  predniSONE   Tablet 5 milliGRAM(s) Oral daily  senna 2 Tablet(s) Oral at bedtime  sodium bicarbonate 1300 milliGRAM(s) Oral three times a day  trimethoprim   80 mG/sulfamethoxazole 400 mG 1 Tablet(s) Oral <User Schedule>  valGANciclovir 450 milliGRAM(s) Oral <User Schedule>    MEDICATIONS  (PRN):  acetaminophen     Tablet .. 975 milliGRAM(s) Oral every 6 hours PRN Mild Pain (1 - 3)  dextrose Oral Gel 15 Gram(s) Oral once PRN Blood Glucose LESS THAN 70 milliGRAM(s)/deciliter      Vital Signs Last 24 Hrs  T(C): 36.6 (24 Jun 2022 08:50), Max: 36.8 (23 Jun 2022 11:33)  T(F): 97.8 (24 Jun 2022 08:50), Max: 98.2 (23 Jun 2022 11:33)  HR: 61 (24 Jun 2022 08:50) (60 - 80)  BP: 117/64 (24 Jun 2022 08:50) (117/64 - 159/66)  BP(mean): --  RR: 18 (24 Jun 2022 08:50) (18 - 18)  SpO2: 96% (24 Jun 2022 08:50) (95% - 98%)    I&O's Summary    23 Jun 2022 07:01  -  24 Jun 2022 07:00  --------------------------------------------------------  IN: 1010 mL / OUT: 510 mL / NET: 500 mL                              6.7    3.66  )-----------( 286      ( 24 Jun 2022 06:24 )             22.1     06-24    138  |  102  |  47<H>  ----------------------------<  167<H>  4.3   |  24  |  1.96<H>    Ca    8.5      24 Jun 2022 06:24  Phos  3.1     06-24  Mg     2.2     06-24    TPro  5.9<L>  /  Alb  2.4<L>  /  TBili  0.2  /  DBili  x   /  AST  18  /  ALT  10  /  AlkPhos  84  06-24          Culture - Blood (collected 06-21-22 @ 09:02)  Source: .Blood Blood-Peripheral  Preliminary Report (06-22-22 @ 14:01):    No growth to date.    Culture - Blood (collected 06-19-22 @ 06:19)  Source: .Blood Blood  Final Report (06-24-22 @ 09:00):    No Growth Final                    REVIEW OF SYSTEMS  --------------------------------------------------------------------------------  unable to assess, AMS, lethargic    PHYSICAL EXAM:  Constitutional: Well developed / well nourished  Eyes: Anicteric, PERRLA  ENMT: nc/at  Neck: supple  Respiratory: CTA anterior. chest tubes removed, previous site clean  Cardiovascular: RRR  Gastrointestinal: Soft, non distended, NT, incision healed   Genitourinary: voiding spontaneously  Extremities: SCD's in place and working bilaterally, RUE pitting edema  Vascular: Palpable dp pulses bilaterally  Neurological: alert, lethargic. not following commands/questions. +painful stimuli. no tremors  Skin: no rashes, ulcerations or lesions  Musculoskeletal: Moving all extremities, global weakness, greatest at RUE.   Psychiatric: calm

## 2022-06-24 NOTE — PRE-OP CHECKLIST - SELECT TESTS ORDERED
Health Maintenance Due   Topic Date Due   â¢ Colorectal Cancer Screen-  Never done   â¢ COVID-19 Vaccine (3 - Booster for Pfizer series) 09/29/2021       Patient is due for topics as listed above but is not proceeding with Immunization(s) COVID-19 and Colorectal Cancer Screening: Colonoscopy at this time. Results in MD note

## 2022-06-24 NOTE — CHART NOTE - NSCHARTNOTEFT_GEN_A_CORE
PRELIMINARY EEG REVIEW    EEG reviewed to 17:13  Date: 06-24-22 - 9 left frontal and frontotemporal onset seizures since start of recording    This Preliminary report is based on fellow review. Final report pending Completion of study tomorrow morning and following attending review.    Reading Room: [phone]  On Call Service After Hours: [phone]    Wilson Silva MD PGY-5  Epilepsy Fellow

## 2022-06-24 NOTE — PROGRESS NOTE ADULT - ASSESSMENT
66 yr old Male with PMHx ESRD s/p permacath removal 5/10/22 s/p Rt DDRT 4/21/22,  CVA (2019), Afib on apixaban, seizure activity, CAD s/p stents, CABG (2020), HTN, HLD, DM on insulin, gastric/duodenal ulcer, recent hospitalization 4/28/22 -5/10/22 with weakness/anemia found to have perinephric hematoma requiring evacuation and repair of bleeding arterial anastomosis. Curently being treated for UTI with Levaquin Today after developing multiple sz episodes refractory to 5 mg versed IM (EMS) and 2 mg ativan IV in E.D. concerning for status epilepticus requiring intubation for hypoxic respiratory  failure. MICU Consult was called for hypoxic respiratory failure secondary to status epilepticus.  Nephrology consulted for renal failure.       A/P    DDRT on 04/21/22  Course complicated by DGF, was on HD until 4/29/22. Readmitted in May for anemia in the setting of large perinephric hematoma s/p evacuation and repair of arterial anastomosis on 5/2/22. Intra operative biopsy showed no rejection.  ANA likely sec to  hemodynamic change   scr is worsening sec to anemia   optimize glucose   monitor BMP, U/O     HTN   optimal   monitor BP closely     Immunosuppresssion  continue per transplant team

## 2022-06-24 NOTE — PROGRESS NOTE ADULT - PROBLEM SELECTOR PLAN 1
S/p DDRT on 4/21/22 Cr. stable in 2 range. ANA in the setting of hemodynamic instability 2/2 ATN. Donor was 58 , KDPI 82%, DCD, single vessels and ureter, HLA mismatch 1, 2, 2. No DSA, cPRA 0%. 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 Readmitted in May for anemia in the setting of large perinephric hematoma s/p evacuation and repair of arterial anastomosis on 5/2/22. Intra operative biopsy showed no rejection. As outpatient SCr. in th low 2 range. Admitted with lowest documented Cr. of 1.8 since transplant which has trended to 1.7 today. S/p Ertapenem for UTI + 1 dose on 6/22. Will discontinue Abx. F/u CT Surgery and ID for recs. CXR reviewed. Optimize hemodynamics. Adequate UOP. C/w NaHCO3 for acidosis and will help with kaliuresis. Incvreasd Nifedipine to 60mg Daily for hypertension.    Being titrated on Vimpat, off depakote and c/w Keppra for seizures. F/u Neurology. transfuse 2 units.

## 2022-06-24 NOTE — PROGRESS NOTE ADULT - ASSESSMENT
Mr. Castelan is a 66 yr old man with PMHx DM type II, HTN, CAD s/p CABG in 2020, Afib on Eliquis, CVA in 2019 due to Afib, Seizure d/o following CVA last episode was on 4/8/22, h/o GI bleed in 2/2022 EGD with duodenal ulcer,     ESRD s/p DDRT on 4/21/22 (course complicated by DGF, was on HD until 4/29/22, large perinephric hematoma s/p evacuation and repair of arterial anastomosis on 5/2/22) presented from rehab 6/10 with status epilepticus secondary to likely UTI, intubated for airway protection, s/p right thoracentesis complicated by hemothorax.  - 6/11 chest tube placed- 1liter drained   6/12-  intubated sedated CT scan completed  6/13  - Intubated   Chest tube 130/530 for OR with Dr Stover date Wednesday Lizett 15 at 1330  6/14   remains extubated,     vss   spoke to son  received consent for OR   in am  0730  rt chest tube  6/16    remains   extubated,     vss    2 rt chest tube lws  6/17 patient currently stable on room air. R chest tube #1 - WS +A/L, R chest tube #2 - WS no A/L.   6/18  pleurvac  #2  min drainage ,   vss    no a/l     6/19   dc last chest tube   tolerated well     6/24 < from: Xray Chest 1 View- PORTABLE-Urgent (Xray Chest 1 View- PORTABLE-Urgent .) (06.23.22 @ 08:37) >  IMPRESSION:  Bilateral perihilar opacities, not significantly changed. A   can be due to pulmonary edema or multifocal infection.    Small loculated right pleural effusion, not significantly changed.    Smooth bordered opacities again project over the right midlung and   lateral right base, possibly loculated pleural fluid related to the   fissures. Infection, or pulmonary nodules are not excluded.    Chest xray findings reviewed by Dr Cardenas

## 2022-06-24 NOTE — PROGRESS NOTE ADULT - SUBJECTIVE AND OBJECTIVE BOX
VITAL SIGNS      Vital Signs Last 24 Hrs  T(C): 36.6 (22 @ 08:50), Max: 36.6 (22 @ 05:00)  T(F): 97.8 (22 @ 08:50), Max: 97.8 (22 @ 05:00)  HR: 61 (22 @ 08:50) (60 - 80)  BP: 117/64 (22 @ 08:50) (117/64 - 159/66)  RR: 18 (22 @ 08:50) (18 - 18)  SpO2: 96% (22 @ 08:50) (95% - 98%)             @ 07:01  -   @ 07:00  --------------------------------------------------------  IN: 1010 mL / OUT: 510 mL / NET: 500 mL       Daily     Daily Weight in k.9 (2022 05:00)  Admit Wt: Drug Dosing Weight  Height (cm): 177.8 (15 Raffi 2022 07:24)  Weight (kg): 61.7 (15 Raffi 2022 07:24)  BMI (kg/m2): 19.5 (15 Raffi 2022 07:24)  BSA (m2): 1.77 (15 Raffi 2022 07:24)    Bilirubin Total, Serum: 0.2 mg/dL ( @ 06:24)    CAPILLARY BLOOD GLUCOSE      POCT Blood Glucose.: 95 mg/dL (2022 09:02)  POCT Blood Glucose.: 256 mg/dL (2022 03:27)  POCT Blood Glucose.: 180 mg/dL (2022 21:24)  POCT Blood Glucose.: 147 mg/dL (2022 16:58)  POCT Blood Glucose.: 141 mg/dL (2022 12:14)          MEDICATIONS  acetaminophen     Tablet .. 975 milliGRAM(s) Oral every 6 hours PRN  atorvastatin 40 milliGRAM(s) Oral at bedtime  chlorhexidine 2% Cloths 1 Application(s) Topical <User Schedule>  dextrose 5%. 1000 milliLiter(s) IV Continuous <Continuous>  dextrose 50% Injectable 25 Gram(s) IV Push once  dextrose 50% Injectable 12.5 Gram(s) IV Push once  dextrose 50% Injectable 25 Gram(s) IV Push once  dextrose Oral Gel 15 Gram(s) Oral once PRN  diVALproex DR 1000 milliGRAM(s) Oral two times a day  epoetin cortney-epbx (RETACRIT) Injectable 68162 Unit(s) SubCutaneous every 7 days  insulin glargine Injectable (LANTUS) 5 Unit(s) SubCutaneous at bedtime  insulin lispro (ADMELOG) corrective regimen sliding scale   SubCutaneous three times a day before meals  insulin lispro (ADMELOG) corrective regimen sliding scale   SubCutaneous at bedtime  insulin lispro Injectable (ADMELOG) 4 Unit(s) SubCutaneous three times a day before meals  labetalol Injectable 5 milliGRAM(s) IV Push once  lacosamide 100 milliGRAM(s) Oral <User Schedule>  levETIRAcetam 250 milliGRAM(s) Oral two times a day  levothyroxine 50 MICROGram(s) Oral daily  magnesium oxide 400 milliGRAM(s) Oral every 8 hours  multivitamin 1 Tablet(s) Oral daily  mycophenolate mofetil 1000 milliGRAM(s) Oral <User Schedule>  NIFEdipine XL 60 milliGRAM(s) Oral daily  nystatin    Suspension 248772 Unit(s) Oral four times a day  pantoprazole    Tablet 40 milliGRAM(s) Oral before breakfast  polyethylene glycol 3350 17 Gram(s) Oral daily  predniSONE   Tablet 5 milliGRAM(s) Oral daily  senna 2 Tablet(s) Oral at bedtime  sodium bicarbonate 1300 milliGRAM(s) Oral three times a day  trimethoprim   80 mG/sulfamethoxazole 400 mG 1 Tablet(s) Oral <User Schedule>  valGANciclovir 450 milliGRAM(s) Oral <User Schedule>    Constitutional: Well developed / well nourished  Eyes: Anicteric, PERRLA  ENMT: nc/at  Neck: supple  Respiratory: CTA anterior. chest tubes removed, previous site clean  Cardiovascular: RRR  Gastrointestinal: Soft, non distended, NT, incision healed   Genitourinary: voiding spontaneously  Extremities: SCD's in place and working bilaterally, RUE pitting edema  Vascular: Palpable dp pulses bilaterally  Neurological: alert, lethargic. not following commands/questions. +painful stimuli. no tremors  Skin: no rashes, ulcerations or lesions  Musculoskeletal: Moving all extremities,  RUE weakness  >>> <<<      LABS  -    138  |  102  |  47<H>  ----------------------------<  167<H>  4.3   |  24  |  1.96<H>    Ca    8.5      2022 06:24  Phos  3.1       Mg     2.2         TPro  5.9<L>  /  Alb  2.4<L>  /  TBili  0.2  /  DBili  x   /  AST  18  /  ALT  10  /  AlkPhos  84                                   6.7    3.66  )-----------( 286      ( 2022 06:24 )             22.1          PT/INR - ( 2022 06:24 )   PT: 17.3 sec;   INR: 1.49 ratio         PTT - ( 2022 06:24 )  PTT:34.7 sec       PAST MEDICAL & SURGICAL HISTORY:  Hypertension      Diabetes      Dyslipidemia      CAD (Coronary Artery Disease)  with Stents in 2009 and 2019, s/p off-pump C3L on 20      Hypothyroidism      CVA (cerebral vascular accident)  19 with residual bilateral weakness      Anemia      PEG (percutaneous endoscopic gastrostomy) status  removed 2020      Intubation of airway performed without difficulty  Dec 2019  CVA c/b status epilepticus requiring intubation and PEG in 2019-      ESRD on dialysis  M/W/F      Seizures  after CVA, last seizure was last week 22      History of insertion of stent into coronary artery bypass graft   and       S/P CABG x 3  off pump C3L on 20

## 2022-06-24 NOTE — PROGRESS NOTE ADULT - PROBLEM SELECTOR PLAN 2
S/p Thymo induction. Stop CNI as MRI concerning for PRES and increased seizure activity. Will start on Belatacept today. C/w prednisone 5mg daily. Increase Cellcept to 1gm BID. C/w Valcyte. Discontinued Fluconazole. C/w Nystatin. Resume bactrim as hyperkalemia has resolved. Glucose now better controlled.     If you have any questions, please feel free to contact me  Ant Pantoja  Nephrology Fellow  [phone]; Prefer [employer] TEAMS  (After 5pm or on weekends please page the on-call fellow).

## 2022-06-24 NOTE — PROGRESS NOTE ADULT - ASSESSMENT
67yo man with PMH of CVA (2019) with subsequent seizure disorder, ESRD s/p renal transplant (04/2022), afib (on apixaban), CAD (s/p stents), CABG (2020), HTN, HLD, DM presents to the ED with status epilepticus from [facility]. Semiology includes eyes deviated to the R, rhythmic R facial twitching as well as R shoulder clonic movements lasting approximately 30 seconds each. Of note patient is receiving antibiotics for Klebsiella UTI. Patient received Versed 5mg with EMS, Ativan 2mg and Keppra 1g load in the ED. Labs remarkable for VPA level 34. EEG from 04/2022 showed L frontocentral focal onset seizures. S/p intubation on 6/11. EEG negative for seizure but showed structural or functional abnormality in the left fronto-temporal region and moderate to severe nonspecific diffuse or multifocal cerebral dysfunction. RRT called 6/21 for decreased movement of RUE but with increased tone and tremor like activity/ clonus and diffuse increased tone and increased encephalopathy concerning for seizure. Had recent MRI day prior without signs of acute stroke. Code stroke ended w/ concern for seizure. Neurology following for lethargy and delirium.    Impression: Status epilepticus likely provoked in the setting of infection, with possible decreased seizure threshold in the setting of known focal motor seizures. Change in mental status possibly due to hospital-induced delirium with toxic-metabolic encephalopathy. RRT on 6/21 concerning for seizure of L frontocentral focal onset with generalization. MRI with findings that may be suggestive of PRES, and given patient was on tacrolimus, it has been held. Currently with fluctuating delirium with anemia, elevated Cr as well as diffuse weakness possibly critical illness. Also transitioning from valproic acid to vimpat.     Recommendations:  [x] BP goals of normotension if not contraindicated given concerns of possible PRES per MRI report.  [x] s/p EEG  [x] MR brain w/o contrast  [x] Telemetry monitoring  [x] On keppra 250mg q12h (IV or PO) and valproic acid 1000mg BID PO. Primary team discussed w/ neuro attending and plan to start vimpat 100mg BID and taper valproic acid (1000mg BID > 500mg BID > 250mg BID > then discontinue)  [x] CBC, CMP, VBG w/ lactate, CK (28 on 6/21).   [x] Neuro checks and vital signs per unit protocol  [x] Seizure/fall/aspiration precautions  [/] Advise against driving, operating heavy machinery, water sports/bathing, activity in elevation without appropriate safety precautions  [ ] outpatient EMG/ NCS    Case seen and discussed with neurology attending Dr. Domingo on AM rounds.  67yo man with PMH of CVA (2019) with subsequent seizure disorder, ESRD s/p renal transplant (04/2022), afib (on apixaban), CAD (s/p stents), CABG (2020), HTN, HLD, DM presents to the ED with status epilepticus from [facility]. Semiology includes eyes deviated to the R, rhythmic R facial twitching as well as R shoulder clonic movements lasting approximately 30 seconds each. EEG from 04/2022 showed L frontocentral focal onset seizures. S/p intubation on 6/11. EEG negative for seizure but showed structural or functional abnormality in the left fronto-temporal region and moderate to severe nonspecific diffuse or multifocal cerebral dysfunction. RRT called 6/21 for decreased movement of RUE but with increased tone and tremor like activity/ clonus and diffuse increased tone and increased encephalopathy concerning for seizure. Had MRI prior without signs of acute stroke. Code stroke ended w/ concern for seizure. Neurology following for lethargy and delirium and concern for recurrent seizures.    Impression: Status epilepticus likely provoked in the setting of infection, with possible decreased seizure threshold in the setting of known focal motor seizures. Change in mental status possibly due to hospital-induced delirium with toxic-metabolic encephalopathy. RRT on 6/21 concerning for seizure of L frontocentral focal onset with generalization. MRI with findings that may be suggestive of PRES, and given patient was on tacrolimus, it has been held. Currently with fluctuating delirium with anemia, elevated Cr as well as diffuse weakness. Prelim EEG on 6/24 showing 9 left frontal and frontotemporal onset seizures since start of recording.     Recommendations:  [ ] CBC, CMP, VBG w/ lactate, CK (28 on 6/21), please repeat infectious workup   [x] BP goals of normotension if not contraindicated given concerns of possible PRES per MRI report.  [x] on EEG on 6/24 showing concerns of L frontotemporal/ frontal seizures. Was on keppra 250mg q12h and valproic acid 1000mg BID PO. Was initially planned to taper the valproic acid to switch to vimpat as patient was on previously but even prior to tapering, was found on EEG with seizures. Will discontinue keppra, rebolus valproic acid at 1500mg x1 IV, then continue valproic acid at 750mg q8hrs IV. Convert vimpat to 100mg BID IV. Check level in 2 hours after bolus (to see level response), and check level prior to the AM dose. **Will determine tonight when to start the 750mg IV dose of vimpat after the bolus based on the patient's level that is obtained after the bolus.  [x] MR brain w/o contrast  [x] Telemetry monitoring  [x] Neuro checks and vital signs per unit protocol  [x] Seizure/fall/aspiration precautions  [/] Advise against driving, operating heavy machinery, water sports/bathing, activity in elevation without appropriate safety precautions  [ ] outpatient EMG/ NCS    Case seen and discussed with neurology attending Dr. Domingo on AM rounds. Once EEG showed seizures, updated primary team at time of event and recommendations discussed with epilepsy fellow Dr Donohue.

## 2022-06-24 NOTE — PROGRESS NOTE ADULT - SUBJECTIVE AND OBJECTIVE BOX
DIABETES FOLLOW UP : Seen earlier today    INTERVAL HX: pt lethargic at time of visit and receiving PRBC went for CT H this Am  , noted breakfast admelog held this AM due to lethargy and not eating, pt opened eyes briefly and went back to sleep .BG persistently elevated at 2am but FBG variable unclear if pt snacking at night . spoke with team to please observe if pt having snack at bedtime tonight       Review of Systems:  unable to obtain pt lethargic     Allergies    No Known Allergies    Intolerances      MEDICATIONS  (STANDING):  atorvastatin 40 milliGRAM(s) Oral at bedtime  chlorhexidine 2% Cloths 1 Application(s) Topical <User Schedule>  dextrose 5%. 1000 milliLiter(s) (50 mL/Hr) IV Continuous <Continuous>  dextrose 50% Injectable 25 Gram(s) IV Push once  dextrose 50% Injectable 12.5 Gram(s) IV Push once  dextrose 50% Injectable 25 Gram(s) IV Push once  diVALproex DR 1000 milliGRAM(s) Oral two times a day  epoetin cortney-epbx (RETACRIT) Injectable 12985 Unit(s) SubCutaneous every 7 days  insulin glargine Injectable (LANTUS) 5 Unit(s) SubCutaneous at bedtime  insulin lispro (ADMELOG) corrective regimen sliding scale   SubCutaneous three times a day before meals  insulin lispro (ADMELOG) corrective regimen sliding scale   SubCutaneous at bedtime  insulin lispro Injectable (ADMELOG) 4 Unit(s) SubCutaneous three times a day before meals  labetalol Injectable 5 milliGRAM(s) IV Push once  lacosamide 100 milliGRAM(s) Oral <User Schedule>  levETIRAcetam 250 milliGRAM(s) Oral two times a day  levothyroxine 50 MICROGram(s) Oral daily  magnesium oxide 400 milliGRAM(s) Oral every 8 hours  multivitamin 1 Tablet(s) Oral daily  mycophenolate mofetil 1000 milliGRAM(s) Oral <User Schedule>  NIFEdipine XL 60 milliGRAM(s) Oral daily  nystatin    Suspension 663556 Unit(s) Oral four times a day  pantoprazole    Tablet 40 milliGRAM(s) Oral before breakfast  polyethylene glycol 3350 17 Gram(s) Oral daily  predniSONE   Tablet 5 milliGRAM(s) Oral daily  senna 2 Tablet(s) Oral at bedtime  trimethoprim   80 mG/sulfamethoxazole 400 mG 1 Tablet(s) Oral <User Schedule>  valGANciclovir 450 milliGRAM(s) Oral <User Schedule>      PHYSICAL EXAM:  VITALS: T(C): 36.6 (06-24-22 @ 12:05)  T(F): 97.9 (06-24-22 @ 12:05), Max: 97.9 (06-24-22 @ 12:05)  HR: 68 (06-24-22 @ 12:05) (60 - 80)  BP: 136/57 (06-24-22 @ 12:05) (117/64 - 159/66)  RR:  (18 - 18)  SpO2:  (95% - 98%)  Wt(kg): --  GENERAL: in NAD  Respiratory: Respirations unlabored   Extremities: Warm  NEURO: Alert , appropriate     LABS:  POCT Blood Glucose.: 128 mg/dL (06-24-22 @ 12:50)  POCT Blood Glucose.: 95 mg/dL (06-24-22 @ 09:02)  POCT Blood Glucose.: 256 mg/dL (06-24-22 @ 03:27)  POCT Blood Glucose.: 180 mg/dL (06-23-22 @ 21:24)  POCT Blood Glucose.: 147 mg/dL (06-23-22 @ 16:58)  POCT Blood Glucose.: 141 mg/dL (06-23-22 @ 12:14)  POCT Blood Glucose.: 230 mg/dL (06-23-22 @ 08:31)  POCT Blood Glucose.: 230 mg/dL (06-23-22 @ 03:11)  POCT Blood Glucose.: 121 mg/dL (06-22-22 @ 23:40)  POCT Blood Glucose.: 81 mg/dL (06-22-22 @ 22:18)  POCT Blood Glucose.: 70 mg/dL (06-22-22 @ 21:19)  POCT Blood Glucose.: 142 mg/dL (06-22-22 @ 17:19)  POCT Blood Glucose.: 120 mg/dL (06-22-22 @ 12:26)  POCT Blood Glucose.: 136 mg/dL (06-22-22 @ 08:35)  POCT Blood Glucose.: 216 mg/dL (06-22-22 @ 03:13)  POCT Blood Glucose.: 234 mg/dL (06-21-22 @ 21:24)  POCT Blood Glucose.: 156 mg/dL (06-21-22 @ 17:13)                            6.7    3.66  )-----------( 286      ( 24 Jun 2022 06:24 )             22.1       06-24    138  |  102  |  47<H>  ----------------------------<  167<H>  4.3   |  24  |  1.96<H>    Ca    8.5      24 Jun 2022 06:24  Phos  3.1     06-24  Mg     2.2     06-24    TPro  5.9<L>  /  Alb  2.4<L>  /  TBili  0.2  /  DBili  x   /  AST  18  /  ALT  10  /  AlkPhos  84  06-24      eGFR: 37 mL/min/1.73m2 (24 Jun 2022 06:24)          Thyroid Function Tests:          A1C with Estimated Average Glucose Result: 6.6 % (04-24-22 @ 17:12)      Estimated Average Glucose: 143 mg/dL (04-24-22 @ 17:12)                         DIABETES FOLLOW UP : Seen earlier today    INTERVAL HX: pt lethargic at time of visit and receiving PRBC went for CT H this Am  , noted breakfast admelog held this AM due to lethargy and not eating, pt opened eyes briefly and went back to sleep .BG persistently elevated at 2am but FBG variable unclear if pt snacking at night . spoke with team to please observe if pt having snack at bedtime tonight       Review of Systems:  unable to obtain pt lethargic     Allergies    No Known Allergies    Intolerances      MEDICATIONS  (STANDING):  atorvastatin 40 milliGRAM(s) Oral at bedtime  chlorhexidine 2% Cloths 1 Application(s) Topical <User Schedule>  dextrose 5%. 1000 milliLiter(s) (50 mL/Hr) IV Continuous <Continuous>  dextrose 50% Injectable 25 Gram(s) IV Push once  dextrose 50% Injectable 12.5 Gram(s) IV Push once  dextrose 50% Injectable 25 Gram(s) IV Push once  diVALproex DR 1000 milliGRAM(s) Oral two times a day  epoetin cortney-epbx (RETACRIT) Injectable 83709 Unit(s) SubCutaneous every 7 days  insulin glargine Injectable (LANTUS) 5 Unit(s) SubCutaneous at bedtime  insulin lispro (ADMELOG) corrective regimen sliding scale   SubCutaneous three times a day before meals  insulin lispro (ADMELOG) corrective regimen sliding scale   SubCutaneous at bedtime  insulin lispro Injectable (ADMELOG) 4 Unit(s) SubCutaneous three times a day before meals  labetalol Injectable 5 milliGRAM(s) IV Push once  lacosamide 100 milliGRAM(s) Oral <User Schedule>  levETIRAcetam 250 milliGRAM(s) Oral two times a day  levothyroxine 50 MICROGram(s) Oral daily  magnesium oxide 400 milliGRAM(s) Oral every 8 hours  multivitamin 1 Tablet(s) Oral daily  mycophenolate mofetil 1000 milliGRAM(s) Oral <User Schedule>  NIFEdipine XL 60 milliGRAM(s) Oral daily  nystatin    Suspension 249158 Unit(s) Oral four times a day  pantoprazole    Tablet 40 milliGRAM(s) Oral before breakfast  polyethylene glycol 3350 17 Gram(s) Oral daily  predniSONE   Tablet 5 milliGRAM(s) Oral daily  senna 2 Tablet(s) Oral at bedtime  trimethoprim   80 mG/sulfamethoxazole 400 mG 1 Tablet(s) Oral <User Schedule>  valGANciclovir 450 milliGRAM(s) Oral <User Schedule>      PHYSICAL EXAM:  VITALS: T(C): 36.6 (06-24-22 @ 12:05)  T(F): 97.9 (06-24-22 @ 12:05), Max: 97.9 (06-24-22 @ 12:05)  HR: 68 (06-24-22 @ 12:05) (60 - 80)  BP: 136/57 (06-24-22 @ 12:05) (117/64 - 159/66)  RR:  (18 - 18)  SpO2:  (95% - 98%)  Wt(kg): --  GENERAL: male laying in bed  in NAD  Respiratory: Respirations unlabored   Extremities: Warm  NEURO: lethargic     LABS:  POCT Blood Glucose.: 128 mg/dL (06-24-22 @ 12:50)  POCT Blood Glucose.: 95 mg/dL (06-24-22 @ 09:02)  POCT Blood Glucose.: 256 mg/dL (06-24-22 @ 03:27)  POCT Blood Glucose.: 180 mg/dL (06-23-22 @ 21:24)  POCT Blood Glucose.: 147 mg/dL (06-23-22 @ 16:58)  POCT Blood Glucose.: 141 mg/dL (06-23-22 @ 12:14)  POCT Blood Glucose.: 230 mg/dL (06-23-22 @ 08:31)  POCT Blood Glucose.: 230 mg/dL (06-23-22 @ 03:11)  POCT Blood Glucose.: 121 mg/dL (06-22-22 @ 23:40)  POCT Blood Glucose.: 81 mg/dL (06-22-22 @ 22:18)  POCT Blood Glucose.: 70 mg/dL (06-22-22 @ 21:19)  POCT Blood Glucose.: 142 mg/dL (06-22-22 @ 17:19)  POCT Blood Glucose.: 120 mg/dL (06-22-22 @ 12:26)  POCT Blood Glucose.: 136 mg/dL (06-22-22 @ 08:35)  POCT Blood Glucose.: 216 mg/dL (06-22-22 @ 03:13)  POCT Blood Glucose.: 234 mg/dL (06-21-22 @ 21:24)  POCT Blood Glucose.: 156 mg/dL (06-21-22 @ 17:13)                            6.7    3.66  )-----------( 286      ( 24 Jun 2022 06:24 )             22.1       06-24    138  |  102  |  47<H>  ----------------------------<  167<H>  4.3   |  24  |  1.96<H>    Ca    8.5      24 Jun 2022 06:24  Phos  3.1     06-24  Mg     2.2     06-24    TPro  5.9<L>  /  Alb  2.4<L>  /  TBili  0.2  /  DBili  x   /  AST  18  /  ALT  10  /  AlkPhos  84  06-24      eGFR: 37 mL/min/1.73m2 (24 Jun 2022 06:24)          Thyroid Function Tests:          A1C with Estimated Average Glucose Result: 6.6 % (04-24-22 @ 17:12)      Estimated Average Glucose: 143 mg/dL (04-24-22 @ 17:12)

## 2022-06-25 NOTE — CHART NOTE - NSCHARTNOTEFT_GEN_A_CORE
EEG preliminary read (not final) on the initial recording     Cyclic seizures continue unabated near q3-5min ~1-2min each from the left frontal region with centrotemporal spread c/w NCSE  Otherwise moderate slowing noted, nonspecific.    Calling neuro team to discuss options beyond LAC/VPA regimen.  VPA levels have been difficult to achieve, possibly due to some hepatic enzyme induction.  Consider phenytoin load and intermittent benzo if tolerable.    Final report to follow tomorrow morning after completion of study.    [facility] EEG Reading Room Ph#: [phone]  Epilepsy Answering Service after 5PM and before 8:30AM: Ph#: [phone]

## 2022-06-25 NOTE — PROGRESS NOTE ADULT - SUBJECTIVE AND OBJECTIVE BOX
SICU Progress Note    History of Present Illness  Patient is a 65 y/o male w/ a PMHx of CAD s/p CABG (2020), AF on Eliquis c/b CVA (2019) c/b seizures, HTN, HLD, DM type II, hypothyroidism, GI bleed due to a duodenal ulcer (2/2022), and ESRD s/p DDRT (4/21/22) c/b DGF requiring HD (last session 4/29/22) & large perinephric hematoma s/p evacuation w/ revision of arterial anastomosis (5/2/22) who presented on 6/10 for status epilepticus. Patient was intubated for airway protection and transferred to MICU for further management. Patient was loaded w/ valproic acid and continued his home Keppra w/ resolution of his seizures on EEG and patient was extubated on 6/11. Patient was noted to have a large right pleural effusion, thoracentesis was performed w/ 1.3 L of serosanguineous fluid . Patient subsequenlty started on a heparin infusion for his atrial fibrillation post-procedure but had a H&H drop w/ CXR demonstrating complete right lung whiteout concerning for a hemothorax. Patient was transferred to SICU for further management. He was intubated for airway protection again, transfused with blood products, and reversed w/ protamine. A right 28 Fr chest tube was placed w/ a significant amount of sanguineous fluid drained. 6/15 Patient went to OR with thoracic for Right VATS and second chest tube placement. Patient recovered well and was transferred to the floor 6/17. Patient now having 10-11 seizures per hour, likely secondary to tacrolimus toxicity, also with concern for PRES syndrome. SICU reconsulted for monitoring.          SUBJECTIVE/ROS:  A ten-point review of systems was otherwise negative except as noted.      NEURO  Exam: awake, alert, oriented x1, follows commands, no focal deficits  Meds: acetaminophen     Tablet .. 975 milliGRAM(s) Oral every 6 hours PRN Mild Pain (1 - 3)  lacosamide IVPB 100 milliGRAM(s) IV Intermittent every 12 hours  LORazepam   Injectable 0.5 milliGRAM(s) IV Push every 6 hours  valproate sodium  IVPB 1000 milliGRAM(s) IV Intermittent every 8 hours      RESPIRATORY  RR: 21 (06-25-22 @ 15:30) (18 - 21)  SpO2: 95% (06-25-22 @ 15:30) (94% - 100%)  Wt(kg): --  Exam: clear to auscultation bilaterally  Mechanical Ventilation:     Meds:     CARDIOVASCULAR  HR: 70 (06-25-22 @ 15:30) (65 - 99)  BP: 146/69 (06-25-22 @ 15:30) (121/61 - 175/71)  BP(mean): 99 (06-25-22 @ 15:30) (99 - 99)  ABP: --  ABP(mean): --  Wt(kg): --  CVP(cm H2O): --      Exam: regular rate and rhythm  Cardiac Rhythm: sinus  Perfusion     [x]Adequate   [ ]Inadequate  Mentation   [x]Normal       [ ]Reduced  Extremities  [x]Warm         [ ]Cool  Volume Status [ ]Hypervolemic [x]Euvolemic [ ]Hypovolemic  Meds: carvedilol 6.25 milliGRAM(s) Oral every 12 hours  hydrALAZINE Injectable 5 milliGRAM(s) IV Push every 20 minutes PRN sbp >150  NIFEdipine XL 60 milliGRAM(s) Oral daily      GI/NUTRITION  Exam: soft, nontender, nondistended  Diet: Regular  Meds: pantoprazole    Tablet 40 milliGRAM(s) Oral before breakfast  polyethylene glycol 3350 17 Gram(s) Oral daily  senna 2 Tablet(s) Oral at bedtime      GENITOURINARY  I&O's Detail    06-24 @ 07:01 - 06-25 @ 07:00  --------------------------------------------------------  IN:    Oral Fluid: 620 mL  Total IN: 620 mL    OUT:  Total OUT: 0 mL    Total NET: 620 mL      06-25 @ 07:01  -  06-25 @ 15:58  --------------------------------------------------------  IN:    IV PiggyBack: 150 mL    Oral Fluid: 240 mL  Total IN: 390 mL    OUT:  Total OUT: 0 mL    Total NET: 390 mL          06-25    138  |  101  |  54<H>  ----------------------------<  120<H>  4.3   |  26  |  2.11<H>    Ca    8.3<L>      25 Jun 2022 06:48  Phos  3.8     06-25  Mg     2.0     06-25    TPro  5.8<L>  /  Alb  2.4<L>  /  TBili  0.2  /  DBili  x   /  AST  17  /  ALT  10  /  AlkPhos  88  06-25    [ ] Miranda catheter, indication:   Meds: magnesium oxide 400 milliGRAM(s) Oral every 8 hours  multivitamin 1 Tablet(s) Oral daily      HEMATOLOGIC  Meds: apixaban 5 milliGRAM(s) Oral every 12 hours    [ ] VTE Prophylaxis - held due to                         8.5    3.41  )-----------( 322      ( 25 Jun 2022 06:49 )             26.4     PT/INR - ( 25 Jun 2022 06:50 )   PT: 18.0 sec;   INR: 1.56 ratio         PTT - ( 25 Jun 2022 06:50 )  PTT:36.6 sec    INFECTIOUS DISEASES  T(C): 36.6 (06-25-22 @ 15:30), Max: 36.8 (06-24-22 @ 21:21)  Wt(kg): --  WBC Count: 3.41 K/uL (06-25 @ 06:49)  WBC Count: 3.52 K/uL (06-24 @ 21:50)    Recent Cultures:  Specimen Source: .Blood Blood-Peripheral, 06-21 @ 09:02; Results   No growth to date.; Gram Stain: --; Organism: --  Specimen Source: .Blood Blood, 06-19 @ 06:19; Results   No Growth Final; Gram Stain: --; Organism: --    Meds: epoetin cortney-epbx (RETACRIT) Injectable 36594 Unit(s) SubCutaneous every 7 days  mycophenolate mofetil 1000 milliGRAM(s) Oral <User Schedule>  nystatin    Suspension 963998 Unit(s) Oral four times a day  trimethoprim   80 mG/sulfamethoxazole 400 mG 1 Tablet(s) Oral <User Schedule>  valGANciclovir 450 milliGRAM(s) Oral <User Schedule>      ENDOCRINE  Capillary Blood Glucose    Meds: atorvastatin 40 milliGRAM(s) Oral at bedtime  insulin glargine Injectable (LANTUS) 4 Unit(s) SubCutaneous at bedtime  insulin lispro (ADMELOG) corrective regimen sliding scale   SubCutaneous three times a day before meals  insulin lispro (ADMELOG) corrective regimen sliding scale   SubCutaneous at bedtime  insulin lispro Injectable (ADMELOG) 4 Unit(s) SubCutaneous three times a day before meals  levothyroxine 50 MICROGram(s) Oral daily  predniSONE   Tablet 5 milliGRAM(s) Oral daily      ACCESS DEVICES:  [x] Peripheral IV  [ ] Central Venous Line	[ ] R	[ ] L	[ ] IJ	[ ] Fem	[ ] SC	Placed:   [ ] Arterial Line		[ ] R	[ ] L	[ ] Fem	[ ] Rad	[ ] Ax	Placed:   [ ] PICC:					[ ] Mediport  [ ] Urinary Catheter, Date Placed:   [ ] Necessity of urinary, arterial, and venous catheters discussed    OTHER MEDICATIONS:  chlorhexidine 2% Cloths 1 Application(s) Topical <User Schedule>

## 2022-06-25 NOTE — PROGRESS NOTE ADULT - ASSESSMENT
Patient is a 65 y/o male w/ a PMHx of CAD s/p CABG (2020), AF on Eliquis c/b CVA (2019) c/b seizures, HTN, HLD, DM type II, hypothyroidism, GI bleed due to a duodenal ulcer (2/2022), and ESRD s/p DDRT (4/21/22) c/b DGF requiring HD (last session 4/29/22) & large perinephric hematoma s/p evacuation w/ revision of arterial anastomosis (5/2/22) who presented on 6/10 for status epilepticus. Course complicated by hemothorax, s/p VATS, now with recurrent refractory seizures likely secondary to tacrolimus toxicity with concern for PRES syndrome.      Neuro: refractory seizures; PRES syndrome  -MS: waxes and wanes, opens eyes, follows commands, no focal deficit  - pain control w/ tylenol  - continue vEEG  - antiepileptics: vimpat, valproate, Fycompa  - given 1mg Ativan on arrival, continue Ativan 0.5mg q6    Resp: s/p VATS evacuation of hemothorax  - chest tubes removed  - Out of bed to chair, ambulate as tolerated, and incentive spirometry to prevent atelectasis  - keep sats >92%      CVS: hypertension  - continue Nifedipine, Hydralazine, Coreg  - atorvastatin 40mg    GI:   - carb consistent diet, soft with thick liquids  - Bowel regimen with senna & Miralax  - continue home protonix    Renal: ESRD s/p DDRT (4/21/22) c/b DGF requiring HD (last session 4/29/22) & large perinephric hematoma s/p evacuation w/ revision of arterial anastomosis (5/2/22)   -hyperkalemia post-op  -ekg unremarkable, given 5mg of lokelma   - d/c bactrime and started on bicarbonate 1300mg tid  - Monitor I&Os and electrolytes  - replete electrolytes as needed   -mycophenolate 500mg twice a day and tacrolimus 0.5mg    Heme:  - Monitor CBC and coags  -  VTE prophylaxis: on hold as per primary team   -SCD's    ID: ppx  - Monitor for clinical evidence of active infection  - Monitor WBC, temperature, and procalcitonin  - Antibiotcis: emp [id number] q24h, and fluconazole  - ppx ABX: bactrm and valganciclovir  -Cultures: NTD    Endo: hx of DM, Hypothyroidism  - ISS q6h , lantus 32units and 5units premeal admelog  - for HLD : atorvastatin 40mg bedtime  - for hypothyroidism : levothyroxine     GOC:   full code     Patient is a 67 y/o male w/ a PMHx of CAD s/p CABG (2020), AF on Eliquis c/b CVA (2019) c/b seizures, HTN, HLD, DM type II, hypothyroidism, GI bleed due to a duodenal ulcer (2/2022), and ESRD s/p DDRT (4/21/22) c/b DGF requiring HD (last session 4/29/22) & large perinephric hematoma s/p evacuation w/ revision of arterial anastomosis (5/2/22) who presented on 6/10 for status epilepticus. Course complicated by hemothorax, s/p VATS, now with recurrent refractory seizures likely secondary to tacrolimus toxicity with concern for PRES syndrome.      Neuro: refractory seizures; PRES syndrome  -MS: waxes and wanes, opens eyes, follows commands, no focal deficit  - pain control w/ tylenol  - continue vEEG  - antiepileptics: vimpat, valproate, Fycompa  - given 1mg Ativan on arrival, continue Ativan 0.5mg q6    Resp: s/p VATS evacuation of hemothorax  - chest tubes removed  - Out of bed to chair, ambulate as tolerated, and incentive spirometry to prevent atelectasis  - keep sats >92%      CVS: hypertension  - continue Nifedipine, Hydralazine, Coreg  - atorvastatin 40mg    GI:   - carb consistent diet, soft with thick liquids  - Bowel regimen with senna & Miralax  - continue home protonix    Renal: ESRD s/p DDRT (4/21/22) c/b DGF requiring HD (last session 4/29/22) & large perinephric hematoma s/p evacuation w/ revision of arterial anastomosis (5/2/22)   - ANA  - Monitor I&Os and electrolytes  - replete electrolytes as needed   - mycophenolate 1g BID, prednisone 5mg daily    Heme: RUE and RIJ DVT  - continue Eliquis 5 q12  - Monitor CBC and coags   - SCD's    ID: ppx  - Monitor for clinical evidence of active infection  - Monitor WBC, temperature, and procalcitonin  - AntibiotiCs: emp [id number] q24h, and fluconazole  - ppx ABX: bactrim and valganciclovir  - infectious workup pending    Endo: hx of DM, Hypothyroidism  - ISS qhs , lantus 4U and 4U premeal admelog  - for HLD : atorvastatin 40mg bedtime  - for hypothyroidism : levothyroxine     GOC:   full code

## 2022-06-25 NOTE — PROGRESS NOTE ADULT - ASSESSMENT
66M with h/o DM type II, HTN, CAD s/p CABG in 2020, Afib on Eliquis, CVA in 2019 due to Afib, Seizure d/o (last episode was on 4/8/22), h/o GI bleed in 2/2022 EGD with duodenal ulcer and ESRD on HD s/p R DDRT from DCD donor on 4/21/22 complicated by DGF requiring HD until 4/29/22 now with good graft function who presented with status epilepticus in setting of UTI/antibiotics and sub-therapeutic valproic acid level       Neuro:  - repeat CTH, unchanged, no bleed.  - EEG ongoing  - Imaging concerning for PRES: FK discontinued; Belatacept initiated 6/24  - On Vimpat, Depakote adjusted by Neuro- now off keppra  - Keep Mag > 2      R DCD DDRT (ureter stented) 4/21/22  - mild ANA, will monitor  - send UA and urine lytes   - Strict I/Os  - Regular diet  - will remove stent prior to discharge  - send CMV PCR    Immunosuppression:   - Belatacept #1 6/23. #2 on Monday.    -  MMF  1g BID, Pred 5  - PPX: Valcyte (MWF), completed Fluconazole, on Nystatin. on Bactrim MWF    R Pleural effusion  - resolved, chest tubes removed  - persistent RML opacity at site of prior chest tubes on daily CXRs.  Awaiting Thoracic to comment  - off ABX per Transplant ID    DM  - on Lantus/Lispro, Endocrine following     AFib:  - S/p 1U PRBC 6/24 for anemia   - continue Retacrit weekly  - hold home Eliquis today, restart as able    HTN:  - continue Nifedipine  - Keep SBP < 150  - Start home coreg 6.25 bid     Dispo  - PT following: OK   - potential d/c early next week once cleared from Neuro perspective

## 2022-06-25 NOTE — CHART NOTE - NSCHARTNOTEFT_GEN_A_CORE
Neurology team informed pt still has seizure activities noted on EEGs.    Recommendations:  [] Would re-load VPA IV 2g x1  [] Increase maintenance VPA to 1g q8  [] Start fycompa 6 mg Po x1 then transition to 4 mg qd starting tomorrow (6/25). Please monitor for agitation or aggression  [] Ativan 1 mg IVP x1 STAT. If not too lethargic after current dose, consider starting standing Ativan 1 mg q6 for 24-48 hrs depending on the response  [] Seizure precaution  [] Neurocheck and vital per unit protocol  [] Please call [phone] for any questions    Case discussed with epilepsy fellow Dr. Donohue and general neurology attending Dr. Candace Maloney  Neurology PGY2

## 2022-06-25 NOTE — PROGRESS NOTE ADULT - SUBJECTIVE AND OBJECTIVE BOX
Dr. Barrientos  Office [phone] (9 am to 5 pm)  Service: [phone] (5pm to 9am)  Karolina HAAS      RENAL PROGRESS NOTE: DATE OF SERVICE 06-25-22 @ 14:34    Patient is a 66y old  Male who presents with a chief complaint of Status Epilepticus (25 Jun 2022 11:35)      Patient seen and examined at bedside. No chest pain/sob    VITALS:  T(F): 97.4 (06-25-22 @ 13:00), Max: 98.2 (06-24-22 @ 21:21)  HR: 71 (06-25-22 @ 13:00)  BP: 133/54 (06-25-22 @ 13:00)  RR: 18 (06-25-22 @ 13:00)  SpO2: 96% (06-25-22 @ 13:00)  Wt(kg): --    06-24 @ 07:01  -  06-25 @ 07:00  --------------------------------------------------------  IN: 620 mL / OUT: 0 mL / NET: 620 mL          PHYSICAL EXAM:  Constitutional: NAD  Neck: No JVD  Respiratory: CTAB, no wheezes, rales or rhonchi  Cardiovascular: S1, S2, RRR  Gastrointestinal: BS+, soft, NT/ND  Extremities: No peripheral edema    Hospital Medications:   MEDICATIONS  (STANDING):  apixaban 5 milliGRAM(s) Oral every 12 hours  atorvastatin 40 milliGRAM(s) Oral at bedtime  carvedilol 6.25 milliGRAM(s) Oral every 12 hours  chlorhexidine 2% Cloths 1 Application(s) Topical <User Schedule>  epoetin cortney-epbx (RETACRIT) Injectable 37403 Unit(s) SubCutaneous every 7 days  insulin glargine Injectable (LANTUS) 4 Unit(s) SubCutaneous at bedtime  insulin lispro (ADMELOG) corrective regimen sliding scale   SubCutaneous three times a day before meals  insulin lispro (ADMELOG) corrective regimen sliding scale   SubCutaneous at bedtime  insulin lispro Injectable (ADMELOG) 4 Unit(s) SubCutaneous three times a day before meals  lacosamide IVPB 100 milliGRAM(s) IV Intermittent every 12 hours  levothyroxine 50 MICROGram(s) Oral daily  magnesium oxide 400 milliGRAM(s) Oral every 8 hours  multivitamin 1 Tablet(s) Oral daily  mycophenolate mofetil 1000 milliGRAM(s) Oral <User Schedule>  NIFEdipine XL 60 milliGRAM(s) Oral daily  nystatin    Suspension 396061 Unit(s) Oral four times a day  pantoprazole    Tablet 40 milliGRAM(s) Oral before breakfast  polyethylene glycol 3350 17 Gram(s) Oral daily  predniSONE   Tablet 5 milliGRAM(s) Oral daily  senna 2 Tablet(s) Oral at bedtime  trimethoprim   80 mG/sulfamethoxazole 400 mG 1 Tablet(s) Oral <User Schedule>  valGANciclovir 450 milliGRAM(s) Oral <User Schedule>  valproate sodium  IVPB 1000 milliGRAM(s) IV Intermittent every 8 hours      LABS:  06-25    138  |  101  |  54<H>  ----------------------------<  120<H>  4.3   |  26  |  2.11<H>    Ca    8.3<L>      25 Jun 2022 06:48  Phos  3.8     06-25  Mg     2.0     06-25    TPro  5.8<L>  /  Alb  2.4<L>  /  TBili  0.2  /  DBili      /  AST  17  /  ALT  10  /  AlkPhos  88  06-25    Creatinine Trend: 2.11 <--, 1.96 <--, 1.73 <--, 1.75 <--, 1.46 <--, 1.53 <--, 1.54 <--, 1.68 <--    Albumin, Serum: 2.4 g/dL (06-25 @ 06:48)  Phosphorus Level, Serum: 3.8 mg/dL (06-25 @ 06:48)                              8.5    3.41  )-----------( 322      ( 25 Jun 2022 06:49 )             26.4     Urine Studies:  Urinalysis - [06-25-22 @ 14:09]      Color Light Yellow / Appearance Clear / SG 1.017 / pH 7.0      Gluc Negative / Ketone Negative  / Bili Negative / Urobili Negative       Blood Small / Protein 30 mg/dL / Leuk Est Negative / Nitrite Negative      RBC  / WBC  / Hyaline  / Gran  / Sq Epi  / Non Sq Epi  / Bacteria       Iron 17, TIBC 171, %sat 10      [05-02-22 @ 13:03]  Ferritin 1505      [05-02-22 @ 13:05]  PTH -- (Ca 9.2)      [02-05-22 @ 10:13]   294  HbA1c 6.0      [02-21-20 @ 08:53]  Lipid: chol 118, TG 54, HDL 59, LDL --      [06-27-21 @ 09:44]        RADIOLOGY & ADDITIONAL STUDIES:

## 2022-06-25 NOTE — PROVIDER CONTACT NOTE (OTHER) - ACTION/TREATMENT ORDERED:
Dr. Martinez and SICU team made aware during rounds. EKG showed Sinus Bradycardia. No new interventions at this time.

## 2022-06-25 NOTE — CHART NOTE - NSCHARTNOTEFT_GEN_A_CORE
Neurology called regarding pt's seizure activities on eeg.    Recommendations:  [] Would give 1mg Ativan IV x1 STAT (last given ~15:45)  [] Would load fospheny 1g x1  [] Draw VPA level in AM  [] Seizure precaution  [] Neurocheck and vital per unit protocol  [] Please call [phone] for any questions    Case discussed with epilepsy fellow Dr. Donohue under supervision of epilepsy attending Dr. Leonardo Maloney  Neurology PGY2. Neurology called regarding pt's seizure activities on eeg.    Recommendations:  [] Administer 1mg Ativan IV x1 STAT (last given ~15:45)  [] Would load fospheny 1g x1  [] Draw VPA level in AM  [] Seizure precaution  [] Neurocheck and vital per unit protocol  [] Please call [phone] for any questions    Case discussed with epilepsy fellow Dr. Donohue under supervision of epilepsy attending Dr. Leonardo Maloney  Neurology PGY2. Neurology called regarding pt's seizure activities on eeg.    Recommendations:  [] Administer 1mg Ativan IV x1 STAT (last given ~15:45)  [] Would load fospheny 1g x1  [] Draw Valproate and phenytoin levels in AM  [] Seizure precaution  [] Neurocheck and vital per unit protocol  [] Please call [phone] for any questions    Case discussed with epilepsy fellow Dr. Donohue under supervision of epilepsy attending Dr. Leonardo Maloney  Neurology PGY2.

## 2022-06-25 NOTE — PROGRESS NOTE ADULT - SUBJECTIVE AND OBJECTIVE BOX
[facility] DIVISION OF KIDNEY DISEASES AND HYPERTENSION -- FOLLOW UP NOTE  --------------------------------------------------------------------------------  Chief Complaint:    24 hour events/subjective:  Patient was seen and examined at bedside        PAST HISTORY  --------------------------------------------------------------------------------  No significant changes to PMH, PSH, FHx, SHx, unless otherwise noted    ALLERGIES & MEDICATIONS  --------------------------------------------------------------------------------  Allergies    No Known Allergies    Intolerances      Standing Inpatient Medications  apixaban 5 milliGRAM(s) Oral every 12 hours  atorvastatin 40 milliGRAM(s) Oral at bedtime  chlorhexidine 2% Cloths 1 Application(s) Topical <User Schedule>  dextrose 5%. 1000 milliLiter(s) IV Continuous <Continuous>  dextrose 50% Injectable 25 Gram(s) IV Push once  dextrose 50% Injectable 12.5 Gram(s) IV Push once  dextrose 50% Injectable 25 Gram(s) IV Push once  epoetin cortney-epbx (RETACRIT) Injectable 93736 Unit(s) SubCutaneous every 7 days  insulin glargine Injectable (LANTUS) 4 Unit(s) SubCutaneous at bedtime  insulin lispro (ADMELOG) corrective regimen sliding scale   SubCutaneous three times a day before meals  insulin lispro (ADMELOG) corrective regimen sliding scale   SubCutaneous at bedtime  insulin lispro Injectable (ADMELOG) 4 Unit(s) SubCutaneous three times a day before meals  lacosamide IVPB 100 milliGRAM(s) IV Intermittent every 12 hours  levothyroxine 50 MICROGram(s) Oral daily  magnesium oxide 400 milliGRAM(s) Oral every 8 hours  multivitamin 1 Tablet(s) Oral daily  mycophenolate mofetil 1000 milliGRAM(s) Oral <User Schedule>  NIFEdipine XL 60 milliGRAM(s) Oral daily  nystatin    Suspension 688388 Unit(s) Oral four times a day  pantoprazole    Tablet 40 milliGRAM(s) Oral before breakfast  polyethylene glycol 3350 17 Gram(s) Oral daily  predniSONE   Tablet 5 milliGRAM(s) Oral daily  senna 2 Tablet(s) Oral at bedtime  trimethoprim   80 mG/sulfamethoxazole 400 mG 1 Tablet(s) Oral <User Schedule>  valGANciclovir 450 milliGRAM(s) Oral <User Schedule>  valproate sodium  IVPB 750 milliGRAM(s) IV Intermittent every 8 hours    PRN Inpatient Medications  acetaminophen     Tablet .. 975 milliGRAM(s) Oral every 6 hours PRN  dextrose Oral Gel 15 Gram(s) Oral once PRN  hydrALAZINE Injectable 5 milliGRAM(s) IV Push every 20 minutes PRN      REVIEW OF SYSTEMS  --------------------------------------------------------------------------------  Gen: No fatigue, fevers/chills, weakness  Skin: No rashes  Head/Eyes/Ears/Mouth: No headache;No sore throat  Respiratory: No dyspnea, cough,   CV: No chest pain, PND, orthopnea  GI: No abdominal pain, diarrhea, constipation, nausea, vomiting  Transplant: No pain  : No increased frequency, dysuria, hematuria, nocturia  MSK: No joint pain/swelling; no back pain; no edema  Neuro: No dizziness/lightheadedness, weakness, seizures, numbness, tingling  Psych: No significant nervousness, anxiety, stress, depression    All other systems were reviewed and are negative, except as noted.    VITALS/PHYSICAL EXAM  --------------------------------------------------------------------------------  T(C): 36.7 (06-25-22 @ 07:20), Max: 36.8 (06-24-22 @ 21:21)  HR: 70 (06-25-22 @ 07:50) (61 - 99)  BP: 145/58 (06-25-22 @ 07:50) (117/64 - 175/71)  RR: 18 (06-25-22 @ 07:20) (18 - 18)  SpO2: 96% (06-25-22 @ 07:20) (94% - 100%)  Wt(kg): --        06-24-22 @ 07:01  -  06-25-22 @ 07:00  --------------------------------------------------------  IN: 620 mL / OUT: 0 mL / NET: 620 mL      Physical Exam:  	Gen: NAD  	HEENT: PERRL, supple neck, clear oropharynx  	Pulm: CTA B/L  	CV: RRR, S1S2; no rub  	Back: No spinal or CVA tenderness; no sacral edema  	Abd: +BS, soft, nontender/nondistended                      Transplant: No tenderness, swelling  	: No suprapubic tenderness  	UE: Warm, FROM; no edema; no asterixis  	LE: Warm, FROM; no edema  	Neuro: No focal deficits  	Psych: Normal affect and mood  	Skin: Warm, without rashes      LABS/STUDIES  --------------------------------------------------------------------------------              8.5    3.41  >-----------<  322      [06-25-22 @ 06:49]              26.4     138  |  101  |  54  ----------------------------<  120      [06-25-22 @ 06:48]  4.3   |  26  |  2.11        Ca     8.3     [06-25-22 @ 06:48]      Mg     2.0     [06-25-22 @ 06:48]      Phos  3.8     [06-25-22 @ 06:48]    TPro  5.8  /  Alb  2.4  /  TBili  0.2  /  DBili  x   /  AST  17  /  ALT  10  /  AlkPhos  88  [06-25-22 @ 06:48]    PT/INR: PT 18.0 , INR 1.56       [06-25-22 @ 06:50]  PTT: 36.6       [06-25-22 @ 06:50]      Creatinine Trend:  SCr 2.11 [06-25 @ 06:48]  SCr 1.96 [06-24 @ 06:24]  SCr 1.73 [06-23 @ 06:55]  SCr 1.75 [06-22 @ 06:05]  SCr 1.46 [06-21 @ 08:58]    Tacrolimus (), Serum: 4.6 ng/mL (06-21 @ 06:16)  Tacrolimus (), Serum: 5.2 ng/mL (06-20 @ 06:26)  Tacrolimus (), Serum: 2.8 ng/mL (06-19 @ 06:19)            Urinalysis - [06-10-22 @ 22:28]      Color Light Yellow / Appearance Clear / SG 1.016 / pH 6.0      Gluc 100 mg/dL / Ketone Negative  / Bili Negative / Urobili Negative       Blood Small / Protein Trace / Leuk Est Negative / Nitrite Negative      RBC 25 / WBC 5 / Hyaline  / Gran  / Sq Epi  / Non Sq Epi 1 / Bacteria Negative      Iron 17, TIBC 171, %sat 10      [05-02-22 @ 13:03]  Ferritin 1505      [05-02-22 @ 13:05]  PTH -- (Ca 9.2)      [02-05-22 @ 10:13]   294  HbA1c 6.0      [02-21-20 @ 08:53]  Lipid: chol 118, TG 54, HDL 59, LDL --      [06-27-21 @ 09:44]

## 2022-06-25 NOTE — EEG REPORT - NS EEG TEXT BOX
[facility]   COMPREHENSIVE EPILEPSY CENTER   REPORT OF CONTINUOUS VIDEO EEG     [facility]: [address], [place] [address], Ph#: [phone]  [facility]: [id number], [place] [address], Ph#: [phone]  [facility]: [address] [place] [address], Ph#: [phone]    Patient Name: CHAD DUNBAR  Age and : 66y ([birth year])  MRN #: [id number]  Location: [address]   Referring Physician: Lizz Davis    Start Time/Date: 16:06 on 22  End Time/Date: 18:24 on 22  Duration: 01 hours 55 mins    _____________________________________________________________  STUDY INFORMATION    EEG Recording Technique:  The patient underwent continuous Video-EEG monitoring, using Telemetry System hardware on the XLTek Digital System. EEG and video data were stored on a computer hard drive with important events saved in digital archive files. The material was reviewed by a physician (electroencephalographer / epileptologist) on a daily basis. Ramon and seizure detection algorithms were utilized and reviewed. An EEG Technician attended to the patient, and was available throughout daytime work hours.  The epilepsy center neurologist was available in person or on call 24-hours per day.    EEG Placement and Labeling of Electrodes:  The EEG was performed utilizing 20 channel referential EEG connections (coronal over temporal over parasagittal montage) using all standard 10-20 electrode placements with EKG, with additional electrodes placed in the inferior temporal region using the modified 10-10 montage electrode placements for elective admissions, or if deemed necessary. Recording was at a sampling rate of 256 samples per second per channel. Time synchronized digital video recording was done simultaneously with EEG recording. A low light infrared camera was used for low light recording.     _____________________________________________________________  HISTORY    Patient is a 66y old  Male who presents with a chief complaint of Status Epilepticus (2022 08:44)      PERTINENT MEDICATION:  lacosamide IVPB 100 milliGRAM(s) IV Intermittent every 12 hours  valproate sodium  IVPB 750 milliGRAM(s) IV Intermittent every 8 hours    _____________________________________________________________  STUDY INTERPRETATION    Findings: The background was continuous and reactive. During wakefulness, the posterior dominant rhythm consisted of symmetric, well-modulated 8.5Hz activity, with amplitude to 30 uV, that attenuated to eye opening.      Background Slowing:  No generalized background slowing was present.    Focal Slowing:   Continuous polymorphic theta/delta slowing over the left frontotemporal region.    Sleep Background:  Drowsiness was characterized by fragmentation, attenuation, and slowing of the background activity.    Sleep transients were not recorded.    Other Non-Epileptiform Findings:  None were present.    Interictal Epileptiform Activity:   Occasional spike waves over left frontocentral region.    Seizures:   Multiple (~9-10 per hour) seizures recorded from left frontocentral region. Ictal onset with rhythmical 14hz beta over F3 intermixed with beta, rapid involvement of C3 region, evolving to rhythmical 3Hz delta +F, changing back to rhythmical beta, and slowing to repetitive spike wave discharges prior to offset.   In other brief seizure subset, ictal onset appeared to be from left frontopolar Fp1 with rhythmical beta with rapid spread to anterior temporal region.  There was no clear clinical correlate (patient's right side is not visible on video). Approx duration 2 mins.     Activation Procedures:   Hyperventilation was not performed.    Photic stimulation was not performed.     Artifacts:  Intermittent myogenic and movement artifacts were noted.    ECG:  The heart rate on single channel ECG was predominantly between 60-70 BPM.    _____________________________________________________________  EEG SUMMARY/CLASSIFICATION    Abnormal EEG in the awake and drowsy    - Multiple (~9-10 per hour) electrographic seizures recorded from left frontocentral region.  - Occasional spike waves over left frontocentral region.  - Continuous polymorphic theta/delta slowing over the left frontotemporal region.    _____________________________________________________________  EEG IMPRESSION/CLINICAL CORRELATE    Abnormal EEG study.    - Cycling focal seizures from left frontocentral region captured (RECORDING stops at 18:24, due to running locally, pending upload.)  - Risk of focal-onset seizures from left frontocentral region.  - Structural / functional abnormality in the left frontocentral region.    *** PRELIMINARY REPORT - PENDING EPILEPSY ATTENDING REVIEW ***  _____________________________________________________________    Gregorio Donohue MD, PEREZ  Fellow, [facility] Epilepsy [place]     [facility]   COMPREHENSIVE EPILEPSY CENTER   REPORT OF CONTINUOUS VIDEO EEG     [facility]: [address], [place] [address], Ph#: [phone]  [facility]: [id number], [place] [address], Ph#: [phone]  [facility]: [address] [place] [address], Ph#: [phone]    Patient Name: CHAD DUNBAR  Age and : 66y ([birth year])  MRN #: [id number]  Location: [address]   Referring Physician: Lizz Davis    Start Time/Date: 16:06 on 22  End Time/Date: 18:24 on 22  Duration: 01 hours 55 mins    _____________________________________________________________  STUDY INFORMATION    EEG Recording Technique:  The patient underwent continuous Video-EEG monitoring, using Telemetry System hardware on the XLTek Digital System. EEG and video data were stored on a computer hard drive with important events saved in digital archive files. The material was reviewed by a physician (electroencephalographer / epileptologist) on a daily basis. Ramon and seizure detection algorithms were utilized and reviewed. An EEG Technician attended to the patient, and was available throughout daytime work hours.  The epilepsy center neurologist was available in person or on call 24-hours per day.    EEG Placement and Labeling of Electrodes:  The EEG was performed utilizing 20 channel referential EEG connections (coronal over temporal over parasagittal montage) using all standard 10-20 electrode placements with EKG, with additional electrodes placed in the inferior temporal region using the modified 10-10 montage electrode placements for elective admissions, or if deemed necessary. Recording was at a sampling rate of 256 samples per second per channel. Time synchronized digital video recording was done simultaneously with EEG recording. A low light infrared camera was used for low light recording.     _____________________________________________________________  HISTORY  Patient is a 66y old  Male who presents with a chief complaint of Status Epilepticus (2022 08:44)      PERTINENT MEDICATION:  lacosamide IVPB 100 milliGRAM(s) IV Intermittent every 12 hours  valproate sodium  IVPB 750 milliGRAM(s) IV Intermittent every 8 hours    _____________________________________________________________  STUDY INTERPRETATION    Findings: The background was continuous and reactive. During wakefulness, the posterior dominant rhythm consisted of symmetric, well-modulated 8.5Hz activity, with amplitude to 30 uV, that attenuated to eye opening.      Background Slowing:  No generalized background slowing was present.    Focal Slowing:   Continuous polymorphic theta/delta slowing over the left frontotemporal region.    Sleep Background:  Drowsiness was characterized by fragmentation, attenuation, and slowing of the background activity.    Sleep transients were not recorded.    Other Non-Epileptiform Findings:  None were present.    Interictal Epileptiform Activity:   Occasional spike waves over left frontocentral region.    Seizures:   Multiple (~9-10 per hour) seizures recorded from left frontocentral region. Ictal onset with rhythmical 14hz beta over F3 intermixed with beta, rapid involvement of C3 region, evolving to rhythmical 3Hz delta +F, changing back to rhythmical beta, and slowing to repetitive spike wave discharges prior to offset.   In other brief seizure subset, ictal onset appeared to be from left frontopolar Fp1 with rhythmical beta with rapid spread to anterior temporal region.  There was no clear clinical correlate (patient's right side is not visible on video). Approx duration 2 mins.     Activation Procedures:   Hyperventilation was not performed.    Photic stimulation was not performed.     Artifacts:  Intermittent myogenic and movement artifacts were noted.    ECG:  The heart rate on single channel ECG was predominantly between 60-70 BPM.    _____________________________________________________________  EEG SUMMARY/CLASSIFICATION    Abnormal EEG in the awake and drowsy    - Multiple (~9-10 per hour) electrographic seizures recorded from left frontocentral region.  - Occasional spike waves over left frontocentral region.  - Continuous polymorphic theta/delta slowing over the left frontotemporal region.    _____________________________________________________________  EEG IMPRESSION/CLINICAL CORRELATE    Abnormal EEG study.  - Cycling focal seizures from left frontocentral region captured (RECORDING stops at 18:24, due to running locally, pending upload.)  - Cortical irritability in the left frontocentral region.  - Structural / functional abnormality in the left frontocentral region.    _____________________________________________________________    Gregorio Donohue MD, PEREZ  Fellow, [facility] Epilepsy [place]     [facility]   COMPREHENSIVE EPILEPSY CENTER   REPORT OF CONTINUOUS VIDEO EEG     [facility]: [address], [place] [address], Ph#: [phone]  [facility]: [address], [place] [address], Ph#: [phone]  [facility]: [address] [place] [address], Ph#: [phone]    Patient Name: CHAD DUNBAR  Age and : 66y ([birth year])  MRN #: [id number]  Location: [address]   Referring Physician: Lizz Davis    Start Time/Date: 16:06 on 22  End Time/Date: 08:00 on 22  Duration: 15 hours 30 mins  _____________________________________________________________  STUDY INFORMATION    EEG Recording Technique:  The patient underwent continuous Video-EEG monitoring, using Telemetry System hardware on the XLTek Digital System. EEG and video data were stored on a computer hard drive with important events saved in digital archive files. The material was reviewed by a physician (electroencephalographer / epileptologist) on a daily basis. Ramon and seizure detection algorithms were utilized and reviewed. An EEG Technician attended to the patient, and was available throughout daytime work hours.  The epilepsy center neurologist was available in person or on call 24-hours per day.    EEG Placement and Labeling of Electrodes:  The EEG was performed utilizing 20 channel referential EEG connections (coronal over temporal over parasagittal montage) using all standard 10-20 electrode placements with EKG, with additional electrodes placed in the inferior temporal region using the modified 10-10 montage electrode placements for elective admissions, or if deemed necessary. Recording was at a sampling rate of 256 samples per second per channel. Time synchronized digital video recording was done simultaneously with EEG recording. A low light infrared camera was used for low light recording.     _____________________________________________________________  HISTORY  Patient is a 66y old  Male who presents with a chief complaint of Status Epilepticus (2022 08:44)      PERTINENT MEDICATION:  lacosamide IVPB 100 milliGRAM(s) IV Intermittent every 12 hours  valproate sodium  IVPB 750 milliGRAM(s) IV Intermittent every 8 hours  _____________________________________________________________  STUDY INTERPRETATION    Findings: The background was continuous and reactive. During wakefulness, the posterior dominant rhythm consisted of symmetric, well-modulated 8.5Hz activity, with amplitude to 30 uV, that attenuated to eye opening.      Background Slowing:  No generalized background slowing was present.    Focal Slowing:   Continuous polymorphic theta/delta slowing over the left frontotemporal region.    Sleep Background:  Drowsiness was characterized by fragmentation, attenuation, and slowing of the background activity.    Sleep transients were not recorded.    Other Non-Epileptiform Findings:  None were present.    Interictal Epileptiform Activity:   Occasional spike waves over left frontocentral region.    Seizures:   Multiple (~9-10 per hour) seizures recorded from left frontocentral region. Ictal onset with rhythmical 14hz beta over F3 intermixed with beta, rapid involvement of C3 region, evolving to rhythmical 3Hz delta +F, changing back to rhythmical beta, and slowing to repetitive spike wave discharges prior to offset.   In other brief seizure subset, ictal onset appeared to be from left frontopolar Fp1 with rhythmical beta with rapid spread to anterior temporal region.  There was no clear clinical correlate (patient's right side is not visible on video). Approx duration 2 mins.     Activation Procedures:   Hyperventilation was not performed.    Photic stimulation was not performed.     Artifacts:  Intermittent myogenic and movement artifacts were noted.    ECG:  The heart rate on single channel ECG was predominantly between 60-70 BPM.    _____________________________________________________________  EEG SUMMARY/CLASSIFICATION    Abnormal EEG in the awake and drowsy    - Multiple (~9-10 per hour) electrographic seizures recorded from left frontocentral region.  - Occasional spike waves over left frontocentral region.  - Continuous polymorphic theta/delta slowing over the left frontotemporal region.    _____________________________________________________________  EEG IMPRESSION/CLINICAL CORRELATE    Abnormal EEG study.  - Cycling focal seizures from left frontocentral region captured.  - Cortical irritability in the left frontocentral region.  - Structural / functional abnormality in the left frontocentral region.  _____________________________________________________________    Gregorio Donohue MD, PEREZ  Fellow, [facility] Epilepsy [place]

## 2022-06-25 NOTE — PROGRESS NOTE ADULT - ASSESSMENT
66 year old male S/p DDRT on 4/21/22 Cr ~  2 range. (Donor was 58 , KDPI 82%, DCD, single vessels and ureter, HLA mismatch 1, 2, 2. No DSA, cPRA 0%. CMV +/+)  Course complicated by DGF, was on HD until 4/29/22. Readmitted in May for anemia in the setting of large perinephric hematoma s/p evacuation and repair of arterial anastomosis on 5/2/22. Intra operative biopsy showed no rejection. This admission he presented from rehab 6/10 with status epilepticus secondary to likely UTI, intubated for airway protection, s/p right thoracentesis complicated by hemothorax.      1.s/p DDRT on 4/21/22 Cr ~  2 range.   2. IS meds- s/p Thymo induction. CNI was d/c as MRI concerning for PRES and increased seizure activity. started on Belatacept.  C/w prednisone 5mg daily, Cellcept  1gm BID. C/w Valcyte,  Nystatin and bactrim.  3. Seizures, AMS- appreciate neuro recs

## 2022-06-25 NOTE — PROGRESS NOTE ADULT - SUBJECTIVE AND OBJECTIVE BOX
Transplant Surgery Multidisciplinary Progress Note   --------------------------------------------------------------  R DCD DDRT    4/21/22    Present: Patient seen and examined with multidisciplinary team including Transplant Surgeon: Dr. Tena,  Nephrologist: Dr. Mary Montelongo  and unit RN during am rounds.  Disciplines not in attendance will be notified of the plan.     HPI: 66M with PMH: DM type II, HTN, CAD s/p CABG in 2020, AFib on Eliquis, CVA in 2019 due to Afib, Seizure d/o following CVA, last episode was on 4/8/22, h/o GIB in 2/2022 EGD with duodenal ulcer.  ESRD due to DM was on HD since 2019.     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  Donor was 58 , KDPI 82%, DCD, single vessels and ureter, HLA mismatch 1, 2, 2. No DSA, cPRA 0%. 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 Re-admitted in May for anemia in the setting of large perinephric hematoma s/p evacuation and repair of arterial anastomosis on 5/2/22. Intra operative biopsy showed no rejection, Creatinine ranging ~2mg/dL.    In Rehab: He was recently dx with klebsiella UTI rx with ertapenem - then switched to Levaquin day #6.    He also had parainfluenza pneumonia. Was at rehab progressing well.      Hospital course:  - 6/10: transferred from rehab facility for seizure status epilepticus. Intubated for respiratory protection and transferred to MICU.  EEG showed no seizure activity. Neuro consulted.   - 6/11: Extubated. Found to have R pleural effusion. s/p Thoracentesis 1.3L. Eliquis changed to heparin drip.  Post procedure drop in H&H. 5u PRBC, 2 u FFP.    - 6/11 evening: Transferred to SICU for further care in setting of hypoxia, significant R pleural effusion, hgb 6 and hemodynamic instability  - 6/11 Intubated at 9pm and sedated on Precedex.  CT placed, 600ml blood drained.  1L total overnight, started pressors  - 6/11 Received Protamine for PTT >100 (was on Heparin drip started for AF), 2 u FFP and 5u PRBC total  - 6/13 s/p VATS 2.2L old blood removed; second chest tube placed  - 6/18-19: chest tubes removed  - 6/21: PRES on MRI, off Envarsus    Interval Events:  - Afebrile, VSS - Erta dcd 6/24  - s/p 1U prbc for anemia   - repeat head ct yest w/ similar appearance to prior imaging  - EEG w/ seizure activity noted- vimpat restarted off keppra per neurology   - DVT @ [facility] superficial thrombus R cephalic/ basilic- continued on eliquis       Immunosuppression:   Induction:  Thymoglobulin                                        Maintenance immunosuppression: Belatacept #1 6/23, MMF 1g BID, Pred 5  Ongoing monitoring for signs of rejection.    Potential Discharge date: pending clinical improvement  Education:  Medications  Plan of care:  See Below      MEDICATIONS  (STANDING):  atorvastatin 40 milliGRAM(s) Oral at bedtime  chlorhexidine 2% Cloths 1 Application(s) Topical <User Schedule>  dextrose 5%. 1000 milliLiter(s) (50 mL/Hr) IV Continuous <Continuous>  dextrose 50% Injectable 25 Gram(s) IV Push once  dextrose 50% Injectable 12.5 Gram(s) IV Push once  dextrose 50% Injectable 25 Gram(s) IV Push once  diVALproex DR 1000 milliGRAM(s) Oral two times a day  epoetin cortney-epbx (RETACRIT) Injectable 14169 Unit(s) SubCutaneous every 7 days  insulin glargine Injectable (LANTUS) 5 Unit(s) SubCutaneous at bedtime  insulin lispro (ADMELOG) corrective regimen sliding scale   SubCutaneous three times a day before meals  insulin lispro (ADMELOG) corrective regimen sliding scale   SubCutaneous at bedtime  insulin lispro Injectable (ADMELOG) 4 Unit(s) SubCutaneous three times a day before meals  labetalol Injectable 5 milliGRAM(s) IV Push once  lacosamide 100 milliGRAM(s) Oral <User Schedule>  levETIRAcetam 250 milliGRAM(s) Oral two times a day  levothyroxine 50 MICROGram(s) Oral daily  magnesium oxide 400 milliGRAM(s) Oral every 8 hours  multivitamin 1 Tablet(s) Oral daily  mycophenolate mofetil 1000 milliGRAM(s) Oral <User Schedule>  NIFEdipine XL 60 milliGRAM(s) Oral daily  nystatin    Suspension 729189 Unit(s) Oral four times a day  pantoprazole    Tablet 40 milliGRAM(s) Oral before breakfast  polyethylene glycol 3350 17 Gram(s) Oral daily  predniSONE   Tablet 5 milliGRAM(s) Oral daily  senna 2 Tablet(s) Oral at bedtime  sodium bicarbonate 1300 milliGRAM(s) Oral three times a day  trimethoprim   80 mG/sulfamethoxazole 400 mG 1 Tablet(s) Oral <User Schedule>  valGANciclovir 450 milliGRAM(s) Oral <User Schedule>    MEDICATIONS  (PRN):  acetaminophen     Tablet .. 975 milliGRAM(s) Oral every 6 hours PRN Mild Pain (1 - 3)  dextrose Oral Gel 15 Gram(s) Oral once PRN Blood Glucose LESS THAN 70 milliGRAM(s)/deciliter      Vital Signs Last 24 Hrs  T(C): 36.6 (24 Jun 2022 08:50), Max: 36.8 (23 Jun 2022 11:33)  T(F): 97.8 (24 Jun 2022 08:50), Max: 98.2 (23 Jun 2022 11:33)  HR: 61 (24 Jun 2022 08:50) (60 - 80)  BP: 117/64 (24 Jun 2022 08:50) (117/64 - 159/66)  BP(mean): --  RR: 18 (24 Jun 2022 08:50) (18 - 18)  SpO2: 96% (24 Jun 2022 08:50) (95% - 98%)    I&O's Summary    23 Jun 2022 07:01  -  24 Jun 2022 07:00  --------------------------------------------------------  IN: 1010 mL / OUT: 510 mL / NET: 500 mL                              6.7    3.66  )-----------( 286      ( 24 Jun 2022 06:24 )             22.1     06-24    138  |  102  |  47<H>  ----------------------------<  167<H>  4.3   |  24  |  1.96<H>    Ca    8.5      24 Jun 2022 06:24  Phos  3.1     06-24  Mg     2.2     06-24    TPro  5.9<L>  /  Alb  2.4<L>  /  TBili  0.2  /  DBili  x   /  AST  18  /  ALT  10  /  AlkPhos  84  06-24          Culture - Blood (collected 06-21-22 @ 09:02)  Source: .Blood Blood-Peripheral  Preliminary Report (06-22-22 @ 14:01):    No growth to date.    Culture - Blood (collected 06-19-22 @ 06:19)  Source: .Blood Blood  Final Report (06-24-22 @ 09:00):    No Growth Final                REVIEW OF SYSTEMS  --------------------------------------------------------------------------------  unable to assess, AMS, lethargic      PHYSICAL EXAM:  Constitutional: Well developed / well nourished  Eyes: Anicteric, PERRLA  ENMT: nc/at  Neck: supple  Respiratory: CTA anterior. chest tubes removed, previous site clean  Cardiovascular: RRR  Gastrointestinal: Soft, non distended, NT, incision healed   Genitourinary: voiding spontaneously  Extremities: SCD's in place and working bilaterally, RUE pitting edema  Vascular: Palpable dp pulses bilaterally  Neurological: opens eyes to voice, lethargic. not following commands/questions. no tremors  Skin: no rashes, ulcerations or lesions  Musculoskeletal: Moving all extremities, global weakness, greatest at RUE.   Psychiatric: calm

## 2022-06-25 NOTE — PROGRESS NOTE ADULT - SUBJECTIVE AND OBJECTIVE BOX
NEUROLOGY FOLLOW-UP CONSULT NOTE    RFC: Seizures    Interval history: Patient with continued electrographic non-convulsive seizures. vEEG connected at bedside. Denies any complaints and son at bedside. No additional acute neurologic events overnight.    Meds:  MEDICATIONS  (STANDING):  apixaban 5 milliGRAM(s) Oral every 12 hours  atorvastatin 40 milliGRAM(s) Oral at bedtime  carvedilol 6.25 milliGRAM(s) Oral every 12 hours  chlorhexidine 2% Cloths 1 Application(s) Topical <User Schedule>  epoetin cortney-epbx (RETACRIT) Injectable 92898 Unit(s) SubCutaneous every 7 days  insulin glargine Injectable (LANTUS) 4 Unit(s) SubCutaneous at bedtime  insulin lispro (ADMELOG) corrective regimen sliding scale   SubCutaneous three times a day before meals  insulin lispro (ADMELOG) corrective regimen sliding scale   SubCutaneous at bedtime  insulin lispro Injectable (ADMELOG) 4 Unit(s) SubCutaneous three times a day before meals  lacosamide IVPB 100 milliGRAM(s) IV Intermittent every 12 hours  levothyroxine 50 MICROGram(s) Oral daily  magnesium oxide 400 milliGRAM(s) Oral every 8 hours  multivitamin 1 Tablet(s) Oral daily  mycophenolate mofetil 1000 milliGRAM(s) Oral <User Schedule>  NIFEdipine XL 60 milliGRAM(s) Oral daily  nystatin    Suspension 027021 Unit(s) Oral four times a day  pantoprazole    Tablet 40 milliGRAM(s) Oral before breakfast  polyethylene glycol 3350 17 Gram(s) Oral daily  predniSONE   Tablet 5 milliGRAM(s) Oral daily  senna 2 Tablet(s) Oral at bedtime  trimethoprim   80 mG/sulfamethoxazole 400 mG 1 Tablet(s) Oral <User Schedule>  valGANciclovir 450 milliGRAM(s) Oral <User Schedule>  valproate sodium  IVPB 1000 milliGRAM(s) IV Intermittent every 8 hours    MEDICATIONS  (PRN):  acetaminophen     Tablet .. 975 milliGRAM(s) Oral every 6 hours PRN Mild Pain (1 - 3)  hydrALAZINE Injectable 5 milliGRAM(s) IV Push every 20 minutes PRN sbp >150      PMHx/PSHx/FHx/SHx:  Acute respiratory failure with hypoxia    No pertinent family history in first degree relatives    No pertinent family history in first degree relatives    No pertinent family history in first degree relatives    FH: HTN (hypertension)    No pertinent family history in first degree relatives    Family history of cancer of tongue (Father)    Handoff    MEWS Score    Hypertension    Diabetes    Dyslipidemia    CAD (Coronary Artery Disease)    CAD (Coronary Artery Disease)    CRI (Chronic Renal Insufficiency)    Hypothyroidism    CVA (cerebral vascular accident)    Anemia    PEG (percutaneous endoscopic gastrostomy) status    Intubation of airway performed without difficulty    ESRD on dialysis    Seizures    Acute respiratory failure with hypoxia    Renal transplant recipient    Immunosuppressive management encounter following kidney transplant    Hemothorax    Hypothyroidism    Type 2 diabetes mellitus with hypoglycemia, with long-term current use of insulin    HTN (hypertension)    HLD (hyperlipidemia)    No significant past surgical history    History of insertion of stent into coronary artery bypass graft    S/P CABG x 3    SEIZURE    30    Convulsive status epilepticus    SysAdmin_VisitLink        Allergies:  No Known Allergies      ROS: All systems negative except as documented in Interval history    O:  T(C): 36.3 (06-25-22 @ 13:00), Max: 36.8 (06-24-22 @ 21:21)  HR: 71 (06-25-22 @ 13:00) (65 - 99)  BP: 133/54 (06-25-22 @ 13:00) (121/61 - 175/71)  RR: 18 (06-25-22 @ 13:00) (18 - 18)  SpO2: 96% (06-25-22 @ 13:00) (94% - 100%)    Focused neurologic exam:  MS - Awake and somnolent, AO x1.5 (knew he was in a hospital but not which), speech limited and bradyphrenic but no dysarthria and otherwise fluent, rep/naming dec, follows simple commands. Attn/conc/recent and remote memory/fund of knowledge dec  CN - PERRLA, EOMI, VFF, face sens/str/hearing WNL b/l, tongue/palate midline, trap 5/5 b/l  Motor - Normal bulk. Inc tone in RUE > LUE and moderately in BLE's (possible paratonia). Strength RUE 4/5, LUE 4+/5 shoulder flex, b/l LE 3/5 proximally  Sens - LT/temp intact all  DTR's - 2+ all and downgoing b/l plantar response  Coord - Grossly appropriate for level of strength  Gait and station - Due to fall risk/safety concerns did not assess    Pertinent labs/studies:  PT/INR inc 18.0/1.56, PTT inc 36.6  CBC with low H/H 9/27 and MCV WNL, otherwise essentially WNL  BMP with inc BUN/Cr 54/2.11 (GFR dec 34), otherwise essentially WNL  Mg WNL, Phos WNL  Albumin dec 2.4, LFT's WNL    VPA level (6/25) - 15, dec    vEEG 6/25 -  EEG SUMMARY/CLASSIFICATION    Abnormal EEG in the awake and drowsy    - Multiple (~9-10 per hour) electrographic seizures recorded from left frontocentral region.  - Occasional spike waves over left frontocentral region.  - Continuous polymorphic theta/delta slowing over the left frontotemporal region.    _____________________________________________________________  EEG IMPRESSION/CLINICAL CORRELATE    Abnormal EEG study.  - Cycling focal seizures from left frontocentral region captured (RECORDING stops at 18:24, due to running locally, pending upload.)  - Cortical irritability in the left frontocentral region.  - Structural / functional abnormality in the left frontocentral region.    < from: CT Head No Cont (06.24.22 @ 10:29) >    ACC: [id number] EXAM:  CT BRAIN                          PROCEDURE DATE:  06/24/2022      INTERPRETATION:  .    CLINICAL INFORMATION: Worsening lethargy. Altered mental status.    TECHNIQUE: Multiple axial CT images of the head were obtained without   contrast. Sagittal and coronal reconstructed images were acquired from   the source data.    COMPARISON: Prior head CT study from 6/21/2022. Prior brain MRI study   from 6/20/2022.    FINDINGS: Extensive encephalomalacia and gliosis are again noted within   the high bilateral cerebral hemispheres.. Overall appearance is unchanged.    There is no acute intracranial hemorrhage, mass effect, shift of the   midline structures, herniation, hydrocephalus, abnormal extra-axial fluid   collection.    There is diffuse cerebral volume loss with prominence of the sulci,   fissures, and cisternal spaces which is normal for the patient's age.   There is moderate patchy confluent periventricular white matter   hypoattenuation statistically compatible with microvascular changes given   calcific atherosclerotic disease of the intracranial arteries.    The paranasal sinuses and mastoid air cells are clear. The calvarium   appears intact. There is evidence of bilateral cataract removal.    IMPRESSION:No acute intracranial hemorrhage.    Similar-appearing extensive encephalomalacia and gliosis within the   bilateral high cerebral hemispheres.    Similar-appearing moderate severity chronic white matter microvascular   type changes.

## 2022-06-25 NOTE — PROGRESS NOTE ADULT - ASSESSMENT
Epilepsy, intractable, with status epilepticus  Encephalopathy  ANA  Prior strokes  Atrial fibrillation  CAD  HTN  DM type 2    - Patient with continued non-convulsive electrographic seizures, refractory to current therapy. Denies any new focal neurologic deficits  - vEEG to continue to observe for seizures  - VPA level low, will reload with VPA 2g IV and then increase maintenance to 1g IV [id number]. Please check new VPA trough (30-60 minutes before dose) and LFT's tomorrow (6/26) AM  - Start Fycompa at 6mg PO x1 NOW and then 4mg PO daily starting tomorrow (6/26)  - Continue Vimpat 100mg IV/PO [id number]  - Continue to address above medical problems, as you are doing  - Will continue to follow patient with you

## 2022-06-25 NOTE — PROGRESS NOTE ADULT - ASSESSMENT
66 yr old Male with PMHx ESRD s/p permacath removal 5/10/22 s/p Rt DDRT 4/21/22,  CVA (2019), Afib on apixaban, seizure activity, CAD s/p stents, CABG (2020), HTN, HLD, DM on insulin, gastric/duodenal ulcer, recent hospitalization 4/28/22 -5/10/22 with weakness/anemia found to have perinephric hematoma requiring evacuation and repair of bleeding arterial anastomosis. Curently being treated for UTI with Levaquin Today after developing multiple sz episodes refractory to 5 mg versed IM (EMS) and 2 mg ativan IV in E.D. concerning for status epilepticus requiring intubation for hypoxic respiratory  failure. MICU Consult was called for hypoxic respiratory failure secondary to status epilepticus.  Nephrology consulted for renal failure.       A/P  DDRT on 04/21/22  Course complicated by DGF, was on HD until 4/29/22. Readmitted in May for anemia in the setting of large perinephric hematoma s/p evacuation and repair of arterial anastomosis on 5/2/22. Intra operative biopsy showed no rejection.  ANA likely sec to  hemodynamic change   scr is worsening-monitor closely   optimize glucose   monitor BMP, U/O     HTN   optimal   monitor BP closely     Immunosuppresssion  continue per transplant team

## 2022-06-26 NOTE — PROGRESS NOTE ADULT - ASSESSMENT
66 yr old Male with PMHx ESRD s/p permacath removal 5/10/22 s/p Rt DDRT 4/21/22,  CVA (2019), Afib on apixaban, seizure activity, CAD s/p stents, CABG (2020), HTN, HLD, DM on insulin, gastric/duodenal ulcer, recent hospitalization 4/28/22 -5/10/22 with weakness/anemia found to have perinephric hematoma requiring evacuation and repair of bleeding arterial anastomosis. Curently being treated for UTI with Levaquin Today after developing multiple sz episodes refractory to 5 mg versed IM (EMS) and 2 mg ativan IV in E.D. concerning for status epilepticus requiring intubation for hypoxic respiratory  failure. MICU Consult was called for hypoxic respiratory failure secondary to status epilepticus.  Nephrology consulted for renal failure.     A/P  DDRT on 04/21/22  Course complicated by DGF, was on HD until 4/29/22. Readmitted in May for anemia in the setting of large perinephric hematoma s/p evacuation and repair of arterial anastomosis on 5/2/22. Intra operative biopsy showed no rejection.  ANA likely sec to  hemodynamic change   scr is fluctuating-monitor closely   optimize glucose   monitor BMP, U/O     HTN   optimal   monitor BP closely     Immunosuppresssion  continue per transplant team

## 2022-06-26 NOTE — PROGRESS NOTE ADULT - SUBJECTIVE AND OBJECTIVE BOX
NEUROLOGY FOLLOW-UP CONSULT NOTE    RFC: Seizures    Interval history: Patient with continued electrographic non-convulsive seizures. vEEG connected at bedside. Transferred to SICU given frequency for seizures and need for more close monitoring. Denies any complaints and son at bedside. No additional acute neurologic events overnight.    Meds:  MEDICATIONS  (STANDING):  atorvastatin 40 milliGRAM(s) Oral at bedtime  chlorhexidine 2% Cloths 1 Application(s) Topical <User Schedule>  epoetin cortney-epbx (RETACRIT) Injectable 08605 Unit(s) SubCutaneous every 7 days  fosphenytoin IVPB 700 milliGRAM(s) PE IV Intermittent once  fosphenytoin IVPB 100 milliGRAM(s) PE IV Intermittent every 8 hours  insulin glargine Injectable (LANTUS) 4 Unit(s) SubCutaneous at bedtime  insulin lispro (ADMELOG) corrective regimen sliding scale   SubCutaneous three times a day before meals  insulin lispro (ADMELOG) corrective regimen sliding scale   SubCutaneous at bedtime  lactated ringers. 1000 milliLiter(s) (75 mL/Hr) IV Continuous <Continuous>  levothyroxine 50 MICROGram(s) Oral daily  magnesium oxide 400 milliGRAM(s) Oral every 8 hours  multivitamin 1 Tablet(s) Oral daily  mycophenolate mofetil 1000 milliGRAM(s) Oral <User Schedule>  NIFEdipine XL 60 milliGRAM(s) Oral daily  nystatin    Suspension 430587 Unit(s) Oral four times a day  pantoprazole    Tablet 40 milliGRAM(s) Oral before breakfast  perampanel 4 milliGRAM(s) Oral at bedtime  predniSONE   Tablet 5 milliGRAM(s) Oral daily  senna 2 Tablet(s) Oral at bedtime  trimethoprim   80 mG/sulfamethoxazole 400 mG 1 Tablet(s) Oral <User Schedule>  valGANciclovir 450 milliGRAM(s) Oral <User Schedule>  valproate sodium  IVPB 1000 milliGRAM(s) IV Intermittent every 8 hours    MEDICATIONS  (PRN):  acetaminophen     Tablet .. 975 milliGRAM(s) Oral every 6 hours PRN Mild Pain (1 - 3)      PMHx/PSHx/FHx/SHx:  Acute respiratory failure with hypoxia    No pertinent family history in first degree relatives    No pertinent family history in first degree relatives    No pertinent family history in first degree relatives    FH: HTN (hypertension)    No pertinent family history in first degree relatives    Family history of cancer of tongue (Father)    Handoff    MEWS Score    Hypertension    Diabetes    Dyslipidemia    CAD (Coronary Artery Disease)    CAD (Coronary Artery Disease)    CRI (Chronic Renal Insufficiency)    Hypothyroidism    CVA (cerebral vascular accident)    Anemia    PEG (percutaneous endoscopic gastrostomy) status    Intubation of airway performed without difficulty    ESRD on dialysis    Seizures    Acute respiratory failure with hypoxia    Renal transplant recipient    Immunosuppressive management encounter following kidney transplant    Hemothorax    Hypothyroidism    Type 2 diabetes mellitus with hypoglycemia, with long-term current use of insulin    HTN (hypertension)    HLD (hyperlipidemia)    No significant past surgical history    History of insertion of stent into coronary artery bypass graft    S/P CABG x 3    SEIZURE    30    Convulsive status epilepticus    SysAdmin_VisitLink        Allergies:  No Known Allergies      ROS: All systems negative except as documented in Interval history    O:  T(C): 1.7 (06-26-22 @ 07:00), Max: 36.6 (06-25-22 @ 15:30)  HR: 48 (06-26-22 @ 09:00) (44 - 71)  BP: 170/78 (06-26-22 @ 09:00) (114/68 - 192/78)  RR: 17 (06-26-22 @ 09:00) (12 - 26)  SpO2: 99% (06-26-22 @ 09:00) (95% - 100%)    Focused neurologic exam:  MS - Awake and somnolent, AO x1.5 (knew he was in a hospital but not which), speech limited and bradyphrenic but no dysarthria and otherwise fluent, rep/naming dec, follows simple commands. Attn/conc/recent and remote memory/fund of knowledge dec  CN - PERRLA, EOMI, VFF, face sens/str/hearing WNL b/l, tongue/palate midline, trap 5/5 b/l  Motor - Normal bulk. Inc tone in RUE > LUE and moderately in BLE's (possible paratonia). Strength RUE 4/5, LUE 4+/5 shoulder flex, b/l LE 3/5 proximally  Sens - LT/temp intact all  DTR's - 2+ all and downgoing b/l plantar response  Coord - Grossly appropriate for level of strength  Gait and station - Due to fall risk/safety concerns did not assess    Pertinent labs/studies:  PT/INR inc 17.5/1.50, PTT dec 23.9  CBC with low WBC 3.12, low H/H 8/25 and MCV WNL, otherwise essentially WNL  BMP with inc BUN/Cr 51/1.89 (GFR dec 38), otherwise essentially WNL  Mg WNL, Phos WNL  Albumin dec 2.0, LFT's WNL  NH3 WNL    VPA level (6/26) - 27, dec  Phenytoin level (6/26) - 6.4, dec    vEEG 6/26 -  EEG SUMMARY/CLASSIFICATION    Abnormal EEG in the awake, drowsy and asleep states.    - Numerous (~9-12 per hour) electrographic seizures recorded from left frontal region, .  - Occasional spike waves over left frontocentral region.  - Continuous polymorphic theta/delta slowing over the left frontotemporal region.    _____________________________________________________________  EEG IMPRESSION/CLINICAL CORRELATE    Abnormal EEG study.  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  - Structural / functional abnormality in the left frontocentral region.    < from: CT Head No Cont (06.24.22 @ 10:29) >    ACC: [id number] EXAM:  CT BRAIN                          PROCEDURE DATE:  06/24/2022      INTERPRETATION:  .    CLINICAL INFORMATION: Worsening lethargy. Altered mental status.    TECHNIQUE: Multiple axial CT images of the head were obtained without   contrast. Sagittal and coronal reconstructed images were acquired from   the source data.    COMPARISON: Prior head CT study from 6/21/2022. Prior brain MRI study   from 6/20/2022.    FINDINGS: Extensive encephalomalacia and gliosis are again noted within   the high bilateral cerebral hemispheres.. Overall appearance is unchanged.    There is no acute intracranial hemorrhage, mass effect, shift of the   midline structures, herniation, hydrocephalus, abnormal extra-axial fluid   collection.    There is diffuse cerebral volume loss with prominence of the sulci,   fissures, and cisternal spaces which is normal for the patient's age.   There is moderate patchy confluent periventricular white matter   hypoattenuation statistically compatible with microvascular changes given   calcific atherosclerotic disease of the intracranial arteries.    The paranasal sinuses and mastoid air cells are clear. The calvarium   appears intact. There is evidence of bilateral cataract removal.    IMPRESSION:No acute intracranial hemorrhage.    Similar-appearing extensive encephalomalacia and gliosis within the   bilateral high cerebral hemispheres.    Similar-appearing moderate severity chronic white matter microvascular   type changes. NEUROLOGY FOLLOW-UP CONSULT NOTE    RFC: Seizures    Interval history: Patient with continued electrographic non-convulsive seizures. vEEG connected at bedside. Transferred to SICU given frequency for seizures and need for more close monitoring. No additional acute neurologic events overnight.    Meds:  MEDICATIONS  (STANDING):  atorvastatin 40 milliGRAM(s) Oral at bedtime  chlorhexidine 2% Cloths 1 Application(s) Topical <User Schedule>  epoetin cortney-epbx (RETACRIT) Injectable 01337 Unit(s) SubCutaneous every 7 days  fosphenytoin IVPB 700 milliGRAM(s) PE IV Intermittent once  fosphenytoin IVPB 100 milliGRAM(s) PE IV Intermittent every 8 hours  insulin glargine Injectable (LANTUS) 4 Unit(s) SubCutaneous at bedtime  insulin lispro (ADMELOG) corrective regimen sliding scale   SubCutaneous three times a day before meals  insulin lispro (ADMELOG) corrective regimen sliding scale   SubCutaneous at bedtime  lactated ringers. 1000 milliLiter(s) (75 mL/Hr) IV Continuous <Continuous>  levothyroxine 50 MICROGram(s) Oral daily  magnesium oxide 400 milliGRAM(s) Oral every 8 hours  multivitamin 1 Tablet(s) Oral daily  mycophenolate mofetil 1000 milliGRAM(s) Oral <User Schedule>  NIFEdipine XL 60 milliGRAM(s) Oral daily  nystatin    Suspension 591406 Unit(s) Oral four times a day  pantoprazole    Tablet 40 milliGRAM(s) Oral before breakfast  perampanel 4 milliGRAM(s) Oral at bedtime  predniSONE   Tablet 5 milliGRAM(s) Oral daily  senna 2 Tablet(s) Oral at bedtime  trimethoprim   80 mG/sulfamethoxazole 400 mG 1 Tablet(s) Oral <User Schedule>  valGANciclovir 450 milliGRAM(s) Oral <User Schedule>  valproate sodium  IVPB 1000 milliGRAM(s) IV Intermittent every 8 hours    MEDICATIONS  (PRN):  acetaminophen     Tablet .. 975 milliGRAM(s) Oral every 6 hours PRN Mild Pain (1 - 3)    GTT's:  None    PMHx/PSHx/FHx/SHx:  Acute respiratory failure with hypoxia    No pertinent family history in first degree relatives    No pertinent family history in first degree relatives    No pertinent family history in first degree relatives    FH: HTN (hypertension)    No pertinent family history in first degree relatives    Family history of cancer of tongue (Father)    Handoff    MEWS Score    Hypertension    Diabetes    Dyslipidemia    CAD (Coronary Artery Disease)    CAD (Coronary Artery Disease)    CRI (Chronic Renal Insufficiency)    Hypothyroidism    CVA (cerebral vascular accident)    Anemia    PEG (percutaneous endoscopic gastrostomy) status    Intubation of airway performed without difficulty    ESRD on dialysis    Seizures    Acute respiratory failure with hypoxia    Renal transplant recipient    Immunosuppressive management encounter following kidney transplant    Hemothorax    Hypothyroidism    Type 2 diabetes mellitus with hypoglycemia, with long-term current use of insulin    HTN (hypertension)    HLD (hyperlipidemia)    No significant past surgical history    History of insertion of stent into coronary artery bypass graft    S/P CABG x 3    SEIZURE    30    Convulsive status epilepticus    SysAdmin_VisitLink        Allergies:  No Known Allergies      ROS: Due to clinical condition unable to assess (PERRY)    O:  T(C): 1.7 (06-26-22 @ 07:00), Max: 36.6 (06-25-22 @ 15:30)  HR: 48 (06-26-22 @ 09:00) (44 - 71)  BP: 170/78 (06-26-22 @ 09:00) (114/68 - 192/78)  RR: 17 (06-26-22 @ 09:00) (12 - 26)  SpO2: 99% (06-26-22 @ 09:00) (95% - 100%)    Focused neurologic exam:  MS - Stuporous, briefly attends to central > appendicular noxious stimuli b/l, no speech output, does not follow commands. PERRY orientation, rep/naming, attn/conc, recent and remote memory, fund of knowledge  CN - Pupils limited as forcibly closing eyes to resistance, EOMI briefly by OCR, (+) face sens/str by corneals b/l, (+) spontaneous respirations. PERRY VF, hearing, tongue/palate, trap/SCM  Motor - Normal bulk. Inc tone in RUE > LUE and moderately in BLE's. No spontaneous movements. Grimaces, withdraws, and localizes to strong noxious in all extremities with RUE 2/5, LUE 2+/5, BLE's 1-2/5 proximally  Sens - LT/temp intact all, as above  DTR's - 2+ BUE's, limited BLE's, and triple flexion L and neutral R plantar response  Coord - PERRY  Gait and station - PERRY    Pertinent labs/studies:  PT/INR inc 17.5/1.50, PTT dec 23.9  CBC with low WBC 3.12, low H/H 8/25 and MCV WNL, otherwise essentially WNL  BMP with inc BUN/Cr 51/1.89 (GFR dec 38), otherwise essentially WNL  Mg WNL, Phos WNL  Albumin dec 2.0, LFT's WNL  NH3 WNL    VPA level (6/26) - 27, dec  Phenytoin level (6/26) - 6.4, dec    vEEG 6/26 -  EEG SUMMARY/CLASSIFICATION    Abnormal EEG in the awake, drowsy and asleep states.    - Numerous (~9-12 per hour) electrographic seizures recorded from left frontal region, .  - Occasional spike waves over left frontocentral region.  - Continuous polymorphic theta/delta slowing over the left frontotemporal region.    _____________________________________________________________  EEG IMPRESSION/CLINICAL CORRELATE    Abnormal EEG study.  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  - Structural / functional abnormality in the left frontocentral region.    < from: CT Head No Cont (06.24.22 @ 10:29) >    ACC: [id number] EXAM:  CT BRAIN                          PROCEDURE DATE:  06/24/2022      INTERPRETATION:  .    CLINICAL INFORMATION: Worsening lethargy. Altered mental status.    TECHNIQUE: Multiple axial CT images of the head were obtained without   contrast. Sagittal and coronal reconstructed images were acquired from   the source data.    COMPARISON: Prior head CT study from 6/21/2022. Prior brain MRI study   from 6/20/2022.    FINDINGS: Extensive encephalomalacia and gliosis are again noted within   the high bilateral cerebral hemispheres.. Overall appearance is unchanged.    There is no acute intracranial hemorrhage, mass effect, shift of the   midline structures, herniation, hydrocephalus, abnormal extra-axial fluid   collection.    There is diffuse cerebral volume loss with prominence of the sulci,   fissures, and cisternal spaces which is normal for the patient's age.   There is moderate patchy confluent periventricular white matter   hypoattenuation statistically compatible with microvascular changes given   calcific atherosclerotic disease of the intracranial arteries.    The paranasal sinuses and mastoid air cells are clear. The calvarium   appears intact. There is evidence of bilateral cataract removal.    IMPRESSION:No acute intracranial hemorrhage.    Similar-appearing extensive encephalomalacia and gliosis within the   bilateral high cerebral hemispheres.    Similar-appearing moderate severity chronic white matter microvascular   type changes.

## 2022-06-26 NOTE — PROGRESS NOTE ADULT - SUBJECTIVE AND OBJECTIVE BOX
HISTORY  66y Male    24 HOUR EVENTS:    SUBJECTIVE/ROS:  [ ] A ten-point review of systems was otherwise negative except as noted.  [ ] Due to altered mental status/intubation, subjective information were not able to be obtained from the patient. History was obtained, to the extent possible, from review of the chart and collateral sources of information.      NEURO  RASS:     GCS:     [facility] ICU:  Exam: awake, alert, oriented  Meds: acetaminophen     Tablet .. 975 milliGRAM(s) Oral every 6 hours PRN Mild Pain (1 - 3)  fosphenytoin IVPB 100 milliGRAM(s) PE IV Intermittent every 8 hours  LORazepam   Injectable 0.5 milliGRAM(s) IV Push every 6 hours  perampanel 4 milliGRAM(s) Oral at bedtime  valproate sodium  IVPB 1000 milliGRAM(s) IV Intermittent every 8 hours    [x] Adequacy of sedation and pain control has been assessed and adjusted      RESPIRATORY  RR: 26 (06-26-22 @ 00:00) (12 - 26)  SpO2: 98% (06-26-22 @ 00:00) (94% - 98%)  Wt(kg): --  Exam: unlabored, clear to auscultation bilaterally  Mechanical Ventilation:     [N/A] Extubation Readiness Assessed  Meds:       CARDIOVASCULAR  HR: 46 (06-26-22 @ 00:00) (44 - 91)  BP: 114/68 (06-26-22 @ 00:00) (114/68 - 175/71)  BP(mean): 87 (06-26-22 @ 00:00) (84 - 102)  ABP: --  ABP(mean): --  Wt(kg): --  CVP(cm H2O): --      Exam: regular rate and rhythm  Cardiac Rhythm: sinus  Perfusion     [x]Adequate   [ ]Inadequate  Mentation   [x]Normal       [ ]Reduced  Extremities  [x]Warm         [ ]Cool  Volume Status [ ]Hypervolemic [x]Euvolemic [ ]Hypovolemic  Meds: carvedilol 6.25 milliGRAM(s) Oral every 12 hours  NIFEdipine XL 60 milliGRAM(s) Oral daily        GI/NUTRITION  Exam: soft, nontender, nondistended, incision C/D/I  Diet:  Meds: pantoprazole    Tablet 40 milliGRAM(s) Oral before breakfast  senna 2 Tablet(s) Oral at bedtime      GENITOURINARY  I&O's Detail    06-24 @ 07:01 - 06-25 @ 07:00  --------------------------------------------------------  IN:    Oral Fluid: 620 mL  Total IN: 620 mL    OUT:  Total OUT: 0 mL    Total NET: 620 mL      06-25 @ 07:01 - 06-26 @ 01:50  --------------------------------------------------------  IN:    IV PiggyBack: 150 mL    IV PiggyBack: 100 mL    Lactated Ringers: 150 mL    Oral Fluid: 240 mL  Total IN: 640 mL    OUT:    Intermittent Catheterization - Urethral (mL): 600 mL    Voided (mL): 300 mL  Total OUT: 900 mL    Total NET: -260 mL          06-25    138  |  101  |  54<H>  ----------------------------<  120<H>  4.3   |  26  |  2.11<H>    Ca    8.3<L>      25 Jun 2022 06:48  Phos  3.8     06-25  Mg     2.0     06-25    TPro  5.8<L>  /  Alb  2.4<L>  /  TBili  0.2  /  DBili  x   /  AST  17  /  ALT  10  /  AlkPhos  88  06-25    [ ] Miranda catheter, indication: N/A  Meds: lactated ringers. 1000 milliLiter(s) IV Continuous <Continuous>  magnesium oxide 400 milliGRAM(s) Oral every 8 hours  multivitamin 1 Tablet(s) Oral daily        HEMATOLOGIC  Meds: apixaban 5 milliGRAM(s) Oral every 12 hours    [x] VTE Prophylaxis                        8.5    3.41  )-----------( 322      ( 25 Jun 2022 06:49 )             26.4     PT/INR - ( 25 Jun 2022 06:50 )   PT: 18.0 sec;   INR: 1.56 ratio         PTT - ( 25 Jun 2022 06:50 )  PTT:36.6 sec  Transfusion     [ ] PRBC   [ ] Platelets   [ ] FFP   [ ] Cryoprecipitate      INFECTIOUS DISEASES  WBC Count: 3.41 K/uL (06-25 @ 06:49)    RECENT CULTURES:  Specimen Source: .Blood Blood-Peripheral  Date/Time: 06-21 @ 09:02  Culture Results:   No growth to date.  Gram Stain: --  Organism: --    Meds: epoetin cortney-epbx (RETACRIT) Injectable 15846 Unit(s) SubCutaneous every 7 days  mycophenolate mofetil 1000 milliGRAM(s) Oral <User Schedule>  nystatin    Suspension 686811 Unit(s) Oral four times a day  trimethoprim   80 mG/sulfamethoxazole 400 mG 1 Tablet(s) Oral <User Schedule>  valGANciclovir 450 milliGRAM(s) Oral <User Schedule>        ENDOCRINE  CAPILLARY BLOOD GLUCOSE      POCT Blood Glucose.: 174 mg/dL (25 Jun 2022 21:22)  POCT Blood Glucose.: 252 mg/dL (25 Jun 2022 17:23)  POCT Blood Glucose.: 138 mg/dL (25 Jun 2022 12:11)  POCT Blood Glucose.: 113 mg/dL (25 Jun 2022 08:24)  POCT Blood Glucose.: 172 mg/dL (25 Jun 2022 03:07)    Meds: atorvastatin 40 milliGRAM(s) Oral at bedtime  insulin glargine Injectable (LANTUS) 4 Unit(s) SubCutaneous at bedtime  insulin lispro (ADMELOG) corrective regimen sliding scale   SubCutaneous three times a day before meals  insulin lispro (ADMELOG) corrective regimen sliding scale   SubCutaneous at bedtime  insulin lispro Injectable (ADMELOG) 4 Unit(s) SubCutaneous three times a day before meals  levothyroxine 50 MICROGram(s) Oral daily  predniSONE   Tablet 5 milliGRAM(s) Oral daily        ACCESS DEVICES:  [ ] Peripheral IV  [ ] Central Venous Line	[ ] R	[ ] L	[ ] IJ	[ ] Fem	[ ] SC	Placed:   [ ] Arterial Line		[ ] R	[ ] L	[ ] Fem	[ ] Rad	[ ] Ax	Placed:   [ ] PICC:					[ ] Mediport  [ ] Urinary Catheter, Date Placed:   [x] Necessity of urinary, arterial, and venous catheters discussed    OTHER MEDICATIONS:  chlorhexidine 2% Cloths 1 Application(s) Topical <User Schedule>      CODE STATUS:      IMAGING:     24 HOUR EVENTS:  - re admitted to SICU For seizure activities on the floor and was started on vimpat and increased valproic acid and added perampanel 4mg during the day time  -overnight called by neurology who noted seizure activities on the EEG added 1mg of ativan and loading dose of 1gram of fosphenytoin and started with 100mg of fosphenytoin and d.c'd vimpat  -started on LR @ 75cc/hr  - patient in urinary retention, straightcath 600cc urine   - left extremity's midline placed       SUBJECTIVE/ROS:  [x ] A ten-point review of systems was otherwise negative except as noted.  [ ] Due to altered mental status/intubation, subjective information were not able to be obtained from the patient. History was obtained, to the extent possible, from review of the chart and collateral sources of information.      NEURO  Exam: lethargy, however opens eyes deep voice, oriented to self and place, no focal deficit  Meds: acetaminophen     Tablet .. 975 milliGRAM(s) Oral every 6 hours PRN Mild Pain (1 - 3)  fosphenytoin IVPB 100 milliGRAM(s) PE IV Intermittent every 8 hours  LORazepam   Injectable 0.5 milliGRAM(s) IV Push every 6 hours  perampanel 4 milliGRAM(s) Oral at bedtime  valproate sodium  IVPB 1000 milliGRAM(s) IV Intermittent every 8 hours    [x] Adequacy of sedation and pain control has been assessed and adjusted      RESPIRATORY  RR: 26 (06-26-22 @ 00:00) (12 - 26)  SpO2: 98% (06-26-22 @ 00:00) (94% - 98%)  Exam: unlabored, clear to auscultation bilaterally    [N/A] Extubation Readiness Assessed        CARDIOVASCULAR  HR: 46 (06-26-22 @ 00:00) (44 - 91)  BP: 114/68 (06-26-22 @ 00:00) (114/68 - 175/71)  BP(mean): 87 (06-26-22 @ 00:00) (84 - 102)    Exam: regular rate and rhythm  Cardiac Rhythm: sinus  Perfusion     [x]Adequate   [ ]Inadequate  Mentation   [x]Normal       [ ]Reduced  Extremities  [x]Warm         [ ]Cool  Volume Status [ ]Hypervolemic [x]Euvolemic [ ]Hypovolemic  Meds: carvedilol 6.25 milliGRAM(s) Oral every 12 hours  NIFEdipine XL 60 milliGRAM(s) Oral daily        GI/NUTRITION  Exam: soft, nontender, nondistended, incision C/D/I  Diet: reg  Meds: pantoprazole    Tablet 40 milliGRAM(s) Oral before breakfast  senna 2 Tablet(s) Oral at bedtime      GENITOURINARY  I&O's Detail    06-24 @ 07:01 - 06-25 @ 07:00  --------------------------------------------------------  IN:    Oral Fluid: 620 mL  Total IN: 620 mL    OUT:  Total OUT: 0 mL    Total NET: 620 mL      06-25 @ 07:01  -  06-26 @ 01:50  --------------------------------------------------------  IN:    IV PiggyBack: 150 mL    IV PiggyBack: 100 mL    Lactated Ringers: 150 mL    Oral Fluid: 240 mL  Total IN: 640 mL    OUT:    Intermittent Catheterization - Urethral (mL): 600 mL    Voided (mL): 300 mL  Total OUT: 900 mL    Total NET: -260 mL          06-25    138  |  101  |  54<H>  ----------------------------<  120<H>  4.3   |  26  |  2.11<H>    Ca    8.3<L>      25 Jun 2022 06:48  Phos  3.8     06-25  Mg     2.0     06-25    TPro  5.8<L>  /  Alb  2.4<L>  /  TBili  0.2  /  DBili  x   /  AST  17  /  ALT  10  /  AlkPhos  88  06-25    [ ] Miranda catheter, indication: N/A  Meds: lactated ringers. 1000 milliLiter(s) IV Continuous <Continuous>  magnesium oxide 400 milliGRAM(s) Oral every 8 hours  multivitamin 1 Tablet(s) Oral daily        HEMATOLOGIC  Meds: apixaban 5 milliGRAM(s) Oral every 12 hours    [x] VTE Prophylaxis                        8.5    3.41  )-----------( 322      ( 25 Jun 2022 06:49 )             26.4     PT/INR - ( 25 Jun 2022 06:50 )   PT: 18.0 sec;   INR: 1.56 ratio         PTT - ( 25 Jun 2022 06:50 )  PTT:36.6 sec  Transfusion     [ ] PRBC   [ ] Platelets   [ ] FFP   [ ] Cryoprecipitate      INFECTIOUS DISEASES  WBC Count: 3.41 K/uL (06-25 @ 06:49)    RECENT CULTURES:  Specimen Source: .Blood Blood-Peripheral  Date/Time: 06-21 @ 09:02  Culture Results:   No growth to date.  Gram Stain: --  Organism: --    Meds: epoetin cortney-epbx (RETACRIT) Injectable 46130 Unit(s) SubCutaneous every 7 days  mycophenolate mofetil 1000 milliGRAM(s) Oral <User Schedule>  nystatin    Suspension 732346 Unit(s) Oral four times a day  trimethoprim   80 mG/sulfamethoxazole 400 mG 1 Tablet(s) Oral <User Schedule>  valGANciclovir 450 milliGRAM(s) Oral <User Schedule>        ENDOCRINE  CAPILLARY BLOOD GLUCOSE      POCT Blood Glucose.: 174 mg/dL (25 Jun 2022 21:22)  POCT Blood Glucose.: 252 mg/dL (25 Jun 2022 17:23)  POCT Blood Glucose.: 138 mg/dL (25 Jun 2022 12:11)  POCT Blood Glucose.: 113 mg/dL (25 Jun 2022 08:24)  POCT Blood Glucose.: 172 mg/dL (25 Jun 2022 03:07)    Meds: atorvastatin 40 milliGRAM(s) Oral at bedtime  insulin glargine Injectable (LANTUS) 4 Unit(s) SubCutaneous at bedtime  insulin lispro (ADMELOG) corrective regimen sliding scale   SubCutaneous three times a day before meals  insulin lispro (ADMELOG) corrective regimen sliding scale   SubCutaneous at bedtime  insulin lispro Injectable (ADMELOG) 4 Unit(s) SubCutaneous three times a day before meals  levothyroxine 50 MICROGram(s) Oral daily  predniSONE   Tablet 5 milliGRAM(s) Oral daily        ACCESS DEVICES:  [ ] Peripheral IV  [ ] Central Venous Line	[ ] R	[ ] L	[ ] IJ	[ ] Fem	[ ] SC	Placed:   [ ] Arterial Line		[ ] R	[ ] L	[ ] Fem	[ ] Rad	[ ] Ax	Placed:   [ ] PICC:					[ ] Mediport  [ ] Urinary Catheter, Date Placed:   [x] Necessity of urinary, arterial, and venous catheters discussed    OTHER MEDICATIONS:  chlorhexidine 2% Cloths 1 Application(s) Topical <User Schedule>      CODE STATUS:      IMAGING:     24 HOUR EVENTS:  - re admitted to SICU For seizure activities on the floor and was started on vimpat and increased valproic acid and added perampanel 4mg during the day time  -overnight called by neurology who noted seizure activities on the EEG added 1mg of ativan and loading dose of 1gram of fosphenytoin and started with 100mg of fosphenytoin and d.c'd vimpat  - now on fosphenytoin 100mg q8h  -started on LR @ 75cc/hr  - patient in urinary retention, straightcath 600cc urine   - left extremity's midline placed       SUBJECTIVE/ROS:  [x ] A ten-point review of systems was otherwise negative except as noted.  [ ] Due to altered mental status/intubation, subjective information were not able to be obtained from the patient. History was obtained, to the extent possible, from review of the chart and collateral sources of information.      NEURO  Exam: lethargy, however opens eyes deep voice, oriented to self and place, no focal deficit  Meds: acetaminophen     Tablet .. 975 milliGRAM(s) Oral every 6 hours PRN Mild Pain (1 - 3)  fosphenytoin IVPB 100 milliGRAM(s) PE IV Intermittent every 8 hours  LORazepam   Injectable 0.5 milliGRAM(s) IV Push every 6 hours  perampanel 4 milliGRAM(s) Oral at bedtime  valproate sodium  IVPB 1000 milliGRAM(s) IV Intermittent every 8 hours    [x] Adequacy of sedation and pain control has been assessed and adjusted      RESPIRATORY  RR: 26 (06-26-22 @ 00:00) (12 - 26)  SpO2: 98% (06-26-22 @ 00:00) (94% - 98%)  Exam: unlabored, clear to auscultation bilaterally    [N/A] Extubation Readiness Assessed        CARDIOVASCULAR  HR: 46 (06-26-22 @ 00:00) (44 - 91)  BP: 114/68 (06-26-22 @ 00:00) (114/68 - 175/71)  BP(mean): 87 (06-26-22 @ 00:00) (84 - 102)    Exam: regular rate and rhythm  Cardiac Rhythm: sinus  Perfusion     [x]Adequate   [ ]Inadequate  Mentation   [x]Normal       [ ]Reduced  Extremities  [x]Warm         [ ]Cool  Volume Status [ ]Hypervolemic [x]Euvolemic [ ]Hypovolemic  Meds: carvedilol 6.25 milliGRAM(s) Oral every 12 hours  NIFEdipine XL 60 milliGRAM(s) Oral daily        GI/NUTRITION  Exam: soft, nontender, nondistended, incision C/D/I  Diet: reg  Meds: pantoprazole    Tablet 40 milliGRAM(s) Oral before breakfast  senna 2 Tablet(s) Oral at bedtime      GENITOURINARY  I&O's Detail    06-24 @ 07:01 - 06-25 @ 07:00  --------------------------------------------------------  IN:    Oral Fluid: 620 mL  Total IN: 620 mL    OUT:  Total OUT: 0 mL    Total NET: 620 mL      06-25 @ 07:01 - 06-26 @ 01:50  --------------------------------------------------------  IN:    IV PiggyBack: 150 mL    IV PiggyBack: 100 mL    Lactated Ringers: 150 mL    Oral Fluid: 240 mL  Total IN: 640 mL    OUT:    Intermittent Catheterization - Urethral (mL): 600 mL    Voided (mL): 300 mL  Total OUT: 900 mL    Total NET: -260 mL          06-25    138  |  101  |  54<H>  ----------------------------<  120<H>  4.3   |  26  |  2.11<H>    Ca    8.3<L>      25 Jun 2022 06:48  Phos  3.8     06-25  Mg     2.0     06-25    TPro  5.8<L>  /  Alb  2.4<L>  /  TBili  0.2  /  DBili  x   /  AST  17  /  ALT  10  /  AlkPhos  88  06-25    [ ] Miranda catheter, indication: N/A  Meds: lactated ringers. 1000 milliLiter(s) IV Continuous <Continuous>  magnesium oxide 400 milliGRAM(s) Oral every 8 hours  multivitamin 1 Tablet(s) Oral daily        HEMATOLOGIC  Meds: apixaban 5 milliGRAM(s) Oral every 12 hours    [x] VTE Prophylaxis                        8.5    3.41  )-----------( 322      ( 25 Jun 2022 06:49 )             26.4     PT/INR - ( 25 Jun 2022 06:50 )   PT: 18.0 sec;   INR: 1.56 ratio         PTT - ( 25 Jun 2022 06:50 )  PTT:36.6 sec  Transfusion     [ ] PRBC   [ ] Platelets   [ ] FFP   [ ] Cryoprecipitate      INFECTIOUS DISEASES  WBC Count: 3.41 K/uL (06-25 @ 06:49)    RECENT CULTURES:  Specimen Source: .Blood Blood-Peripheral  Date/Time: 06-21 @ 09:02  Culture Results:   No growth to date.  Gram Stain: --  Organism: --    Meds: epoetin cortney-epbx (RETACRIT) Injectable 25260 Unit(s) SubCutaneous every 7 days  mycophenolate mofetil 1000 milliGRAM(s) Oral <User Schedule>  nystatin    Suspension 543658 Unit(s) Oral four times a day  trimethoprim   80 mG/sulfamethoxazole 400 mG 1 Tablet(s) Oral <User Schedule>  valGANciclovir 450 milliGRAM(s) Oral <User Schedule>        ENDOCRINE  CAPILLARY BLOOD GLUCOSE      POCT Blood Glucose.: 174 mg/dL (25 Jun 2022 21:22)  POCT Blood Glucose.: 252 mg/dL (25 Jun 2022 17:23)  POCT Blood Glucose.: 138 mg/dL (25 Jun 2022 12:11)  POCT Blood Glucose.: 113 mg/dL (25 Jun 2022 08:24)  POCT Blood Glucose.: 172 mg/dL (25 Jun 2022 03:07)    Meds: atorvastatin 40 milliGRAM(s) Oral at bedtime  insulin glargine Injectable (LANTUS) 4 Unit(s) SubCutaneous at bedtime  insulin lispro (ADMELOG) corrective regimen sliding scale   SubCutaneous three times a day before meals  insulin lispro (ADMELOG) corrective regimen sliding scale   SubCutaneous at bedtime  insulin lispro Injectable (ADMELOG) 4 Unit(s) SubCutaneous three times a day before meals  levothyroxine 50 MICROGram(s) Oral daily  predniSONE   Tablet 5 milliGRAM(s) Oral daily        ACCESS DEVICES:  [ ] Peripheral IV  [ ] Central Venous Line	[ ] R	[ ] L	[ ] IJ	[ ] Fem	[ ] SC	Placed:   [ ] Arterial Line		[ ] R	[ ] L	[ ] Fem	[ ] Rad	[ ] Ax	Placed:   [ ] PICC:					[ ] Mediport  [ ] Urinary Catheter, Date Placed:   [x] Necessity of urinary, arterial, and venous catheters discussed    OTHER MEDICATIONS:  chlorhexidine 2% Cloths 1 Application(s) Topical <User Schedule>      CODE STATUS:      IMAGING:

## 2022-06-26 NOTE — PROGRESS NOTE ADULT - ASSESSMENT
66M with h/o DM type II, HTN, CAD s/p CABG in 2020, Afib on Eliquis, CVA in 2019 due to Afib, Seizure d/o (last episode was on 4/8/22), h/o GI bleed in 2/2022 EGD with duodenal ulcer and ESRD on HD s/p R DDRT from DCD donor on 4/21/22 complicated by DGF requiring HD until 4/29/22 now with good graft function who presented with status epilepticus in setting of UTI/antibiotics and sub-therapeutic valproic acid level       Neuro:  - repeat CTH, unchanged, no bleed.  - EEG ongoing- abnormal study continuous non convulsive seizure activity  - Imaging concerning for PRES: FK discontinued; Belatacept initiated 6/24  - Antiepileptics per Neurology: on Fosphenytoin, perampanel, valproate IV, Ativan   - Keep Mag > 2  - Neurology following   - Repeat MRI   -possible LP - hold eliquis per neurology/ ICU team  - remainder of care per SICU team       R DCD DDRT (ureter stented) 4/21/22  - mild ANA, will monitor   - Strict I/Os  - Regular diet  - will remove stent prior to discharge  - send CMV PCR  - ID consulted in setting of hyperthermia, decreasing wbc     Immunosuppression:   - Belatacept #1 6/23.  HOLDING further doses (next dose due 6/27)  -  MMF  HOLD  Pred 5  - PPX: Valcyte (MWF), completed Fluconazole, on Nystatin. on Bactrim MWF    R Pleural effusion  - resolved, chest tubes removed  - persistent RML opacity at site of prior chest tubes on daily CXRs.  Awaiting Thoracic to comment  - off ABX per Transplant ID    DM  - on Lantus/Lispro, Endocrine following     AFib:  - S/p 1U PRBC 6/24 for anemia   - continue Retacrit weekly  - Eliquis on hold pending possible LP     HTN:  - continue Nifedipine  - Keep SBP < 150  - on coreg 6.25 bid     Dispo  - PT following: OK

## 2022-06-26 NOTE — PROVIDER CONTACT NOTE (CHANGE IN STATUS NOTIFICATION) - ACTION/TREATMENT ORDERED:
Provider assessed patient at bedside. Patient found to be hypoglycemic, 25ml of 50% dextrose given, patient responded well and now withdraws to pain in all extremities and opens eyes to voice. Provider assessed patient at bedside. Patient found to be hypoglycemic, 25ml of 50% dextrose given.

## 2022-06-26 NOTE — AIRWAY PLACEMENT NOTE ADULT - INDICATIONS:
Route for mechanical ventilation
Route for mechanical ventilation/Respiratory distress
Route for mechanical ventilation

## 2022-06-26 NOTE — PROGRESS NOTE ADULT - SUBJECTIVE AND OBJECTIVE BOX
[facility] DIVISION OF KIDNEY DISEASES AND HYPERTENSION -- FOLLOW UP NOTE  --------------------------------------------------------------------------------  Chief Complaint:    24 hour events/subjective:  Patient was seen and examined at bedside    PAST HISTORY  --------------------------------------------------------------------------------  No significant changes to PMH, PSH, FHx, SHx, unless otherwise noted    ALLERGIES & MEDICATIONS  --------------------------------------------------------------------------------  Allergies    No Known Allergies    Intolerances      Standing Inpatient Medications  atorvastatin 40 milliGRAM(s) Oral at bedtime  chlorhexidine 2% Cloths 1 Application(s) Topical <User Schedule>  epoetin cortney-epbx (RETACRIT) Injectable 56991 Unit(s) SubCutaneous every 7 days  fosphenytoin IVPB 100 milliGRAM(s) PE IV Intermittent every 8 hours  insulin glargine Injectable (LANTUS) 4 Unit(s) SubCutaneous at bedtime  insulin lispro (ADMELOG) corrective regimen sliding scale   SubCutaneous three times a day before meals  insulin lispro (ADMELOG) corrective regimen sliding scale   SubCutaneous at bedtime  lactated ringers. 1000 milliLiter(s) IV Continuous <Continuous>  levothyroxine 50 MICROGram(s) Oral daily  magnesium oxide 400 milliGRAM(s) Oral every 8 hours  multivitamin 1 Tablet(s) Oral daily  mycophenolate mofetil 1000 milliGRAM(s) Oral <User Schedule>  NIFEdipine XL 60 milliGRAM(s) Oral daily  nystatin    Suspension 519241 Unit(s) Oral four times a day  pantoprazole    Tablet 40 milliGRAM(s) Oral before breakfast  perampanel 4 milliGRAM(s) Oral at bedtime  predniSONE   Tablet 5 milliGRAM(s) Oral daily  senna 2 Tablet(s) Oral at bedtime  trimethoprim   80 mG/sulfamethoxazole 400 mG 1 Tablet(s) Oral <User Schedule>  valGANciclovir 450 milliGRAM(s) Oral <User Schedule>  valproate sodium  IVPB 1000 milliGRAM(s) IV Intermittent every 8 hours    PRN Inpatient Medications      REVIEW OF SYSTEMS- unable to obtain due to AMS       VITALS/PHYSICAL EXAM  --------------------------------------------------------------------------------  T(C): 1.7 (06-26-22 @ 07:00), Max: 36.6 (06-25-22 @ 15:30)  HR: 46 (06-26-22 @ 10:00) (44 - 71)  BP: 169/74 (06-26-22 @ 10:00) (114/68 - 192/78)  RR: 16 (06-26-22 @ 10:00) (12 - 26)  SpO2: 98% (06-26-22 @ 10:00) (95% - 100%)  Wt(kg): --        06-25-22 @ 07:01  -  06-26-22 @ 07:00  --------------------------------------------------------  IN: 1265 mL / OUT: 1600 mL / NET: -335 mL    06-26-22 @ 07:01  -  06-26-22 @ 10:52  --------------------------------------------------------  IN: 275 mL / OUT: 300 mL / NET: -25 mL                  Physical Exam:  	Gen: drowsy  	HEENT: PERRL, supple neck, clear oropharynx  	Pulm: CTA B/L  	CV: RRR, S1S2; no rub  	Abd: +BS, soft, nontender/nondistended                      Transplant: No tenderness, swelling  	: No suprapubic tenderness  	LE: Warm, FROM; no edema  	Skin: Warm, without rashes      LABS/STUDIES  --------------------------------------------------------------------------------              7.9    3.12  >-----------<  313      [06-26-22 @ 05:43]              25.0     139  |  104  |  51  ----------------------------<  121      [06-26-22 @ 05:43]  4.0   |  25  |  1.89        Ca     8.3     [06-26-22 @ 05:43]      Mg     2.2     [06-26-22 @ 05:43]      Phos  3.8     [06-26-22 @ 05:43]    TPro  5.5  /  Alb  2.0  /  TBili  0.2  /  DBili  x   /  AST  19  /  ALT  10  /  AlkPhos  83  [06-26-22 @ 05:43]    PT/INR: PT 17.5 , INR 1.50       [06-26-22 @ 05:44]  PTT: 23.9       [06-26-22 @ 05:44]      Creatinine Trend:  SCr 1.89 [06-26 @ 05:43]  SCr 2.11 [06-25 @ 06:48]  SCr 1.96 [06-24 @ 06:24]  SCr 1.73 [06-23 @ 06:55]  SCr 1.75 [06-22 @ 06:05]    Tacrolimus (), Serum: 4.6 ng/mL (06-21 @ 06:16)  Tacrolimus (), Serum: 5.2 ng/mL (06-20 @ 06:26)        Urinalysis - [06-26-22 @ 06:25]      Color Light Yellow / Appearance Clear / SG 1.015 / pH 7.0      Gluc Negative / Ketone Negative  / Bili Negative / Urobili Negative       Blood Negative / Protein Trace / Leuk Est Negative / Nitrite Negative      RBC 3 / WBC 1 / Hyaline 0 / Gran  / Sq Epi  / Non Sq Epi 1 / Bacteria Negative    Urine Creatinine 52      [06-25-22 @ 14:10]  Urine Protein 24      [06-25-22 @ 14:10]  Urine Sodium 68      [06-25-22 @ 14:10]  Urine Osmolality 375      [06-25-22 @ 14:10]    Iron 17, TIBC 171, %sat 10      [05-02-22 @ 13:03]  Ferritin 1505      [05-02-22 @ 13:05]  PTH -- (Ca 9.2)      [02-05-22 @ 10:13]   294  HbA1c 6.0      [02-21-20 @ 08:53]  Lipid: chol 118, TG 54, HDL 59, LDL --      [06-27-21 @ 09:44]

## 2022-06-26 NOTE — PROGRESS NOTE ADULT - SUBJECTIVE AND OBJECTIVE BOX
Transplant Surgery Multidisciplinary Progress Note   --------------------------------------------------------------  R DCD DDRT    22    Present: Patient seen and examined with multidisciplinary team including Transplant Surgeon: Dr. Tena,  Nephrologist: Dr. Mary Montelongo  and unit RN during am rounds.  Disciplines not in attendance will be notified of the plan.     HPI: 66M with PMH: DM type II, HTN, CAD s/p CABG in , AFib on Eliquis, CVA in  due to Afib, Seizure d/o following CVA, last episode was on 22, h/o GIB in 2022 EGD with duodenal ulcer.  ESRD due to DM was on HD since .     s/p R DCD DDRT on 22.  Donor was 58 , KDPI 82%, DCD, single vessels and ureter, HLA mismatch 1, 2, 2. No DSA, cPRA 0%. CMV +/+  Course complicated by DGF, was on HD until 22. Re-admitted in May for anemia in the setting of large perinephric hematoma s/p evacuation and repair of arterial anastomosis on 22. Intra operative biopsy showed no rejection, Creatinine ranging ~2mg/dL.    In Rehab: He was recently dx with klebsiella UTI rx with ertapenem - then switched to Levaquin day #6.    He also had parainfluenza pneumonia. Was at rehab progressing well.      Hospital course:  - 6/10: transferred from rehab facility for seizure status epilepticus. Intubated for respiratory protection and transferred to MICU.  EEG showed no seizure activity. Neuro consulted.   - : Extubated. Found to have R pleural effusion. s/p Thoracentesis 1.3L. Eliquis changed to heparin drip.  Post procedure drop in H&H. 5u PRBC, 2 u FFP.    -  evening: Transferred to SICU for further care in setting of hypoxia, significant R pleural effusion, hgb 6 and hemodynamic instability  -  Intubated at 9pm and sedated on Precedex.  CT placed, 600ml blood drained.  1L total overnight, started pressors  -  Received Protamine for PTT >100 (was on Heparin drip started for AF), 2 u FFP and 5u PRBC total  -  s/p VATS 2.2L old blood removed; second chest tube placed  - -: chest tubes removed  - : PRES on MRI, off Envarsus  - : Tx to SICU for inc seizure activity on EEG       Interval Events:  - Hypothermic this am Temp 35  - pan cultures sent today   - Lethargic   - Tx to SICU for continuous seizure activity on EEG  -  Per neurology Valproic acid loaded and dose inc/ started Fycompa/ on ativan ATC for seizures   - started on coreg for hypertension       Immunosuppression:   Induction:  Thymoglobulin                                        Maintenance immunosuppression: Belatacept #1 - Hold next dose, MMF Holding from , Pred 5  Ongoing monitoring for signs of rejection.    Potential Discharge date: pending clinical improvement  Education:  Medications  Plan of care:  See Below      MEDICATIONS  (STANDING):  atorvastatin 40 milliGRAM(s) Oral at bedtime  chlorhexidine 2% Cloths 1 Application(s) Topical <User Schedule>  epoetin cortney-epbx (RETACRIT) Injectable 53757 Unit(s) SubCutaneous every 7 days  fosphenytoin IVPB 100 milliGRAM(s) PE IV Intermittent every 8 hours  insulin glargine Injectable (LANTUS) 4 Unit(s) SubCutaneous at bedtime  insulin lispro (ADMELOG) corrective regimen sliding scale   SubCutaneous three times a day before meals  insulin lispro (ADMELOG) corrective regimen sliding scale   SubCutaneous at bedtime  lactated ringers. 1000 milliLiter(s) (75 mL/Hr) IV Continuous <Continuous>  levothyroxine Injectable 40 MICROGram(s) IV Push at bedtime  magnesium oxide 400 milliGRAM(s) Oral every 8 hours  methylPREDNISolone sodium succinate Injectable 4 milliGRAM(s) IV Push daily  multivitamin 1 Tablet(s) Oral daily  NIFEdipine XL 60 milliGRAM(s) Oral daily  nystatin    Suspension 074075 Unit(s) Oral four times a day  pantoprazole  Injectable 40 milliGRAM(s) IV Push daily  perampanel 4 milliGRAM(s) Oral at bedtime  senna 2 Tablet(s) Oral at bedtime  trimethoprim   80 mG/sulfamethoxazole 400 mG 1 Tablet(s) Oral <User Schedule>  valGANciclovir 450 milliGRAM(s) Oral <User Schedule>  valproate sodium  IVPB 1000 milliGRAM(s) IV Intermittent every 8 hours    MEDICATIONS  (PRN):      PAST MEDICAL & SURGICAL HISTORY:  Hypertension      Diabetes      Dyslipidemia      CAD (Coronary Artery Disease)  with Stents in 2009 and 2019, s/p off-pump C3L on 20      Hypothyroidism      CVA (cerebral vascular accident)  19 with residual bilateral weakness      Anemia      PEG (percutaneous endoscopic gastrostomy) status  removed 2020      Intubation of airway performed without difficulty  Dec 2019  CVA c/b status epilepticus requiring intubation and PEG in 2019-      ESRD on dialysis  M/W/F      Seizures  after CVA, last seizure was last week 22      History of insertion of stent into coronary artery bypass graft   and       S/P CABG x 3  off pump C3L on 20          Vital Signs Last 24 Hrs  T(C): 36.5 (2022 13:00), Max: 36.6 (2022 15:30)  T(F): 97.7 (2022 13:00), Max: 97.8 (2022 15:30)  HR: 46 (2022 13:00) (43 - 70)  BP: 152/65 (2022 13:00) (114/68 - 192/78)  BP(mean): 94 (2022 13:00) (84 - 112)  RR: 17 (2022 13:00) (12 - 26)  SpO2: 99% (2022 13:00) (95% - 100%)    I&O's Summary    2022 07:  -  2022 07:00  --------------------------------------------------------  IN: 1265 mL / OUT: 1600 mL / NET: -335 mL    2022 07:01  -  2022 13:16  --------------------------------------------------------  IN: 500 mL / OUT: 525 mL / NET: -25 mL          LABS:                        7.9    3.12  )-----------( 313      ( 2022 05:43 )             25.0         139  |  104  |  51<H>  ----------------------------<  121<H>  4.0   |  25  |  1.89<H>    Ca    8.3<L>      2022 05:43  Phos  3.8       Mg     2.2         TPro  5.5<L>  /  Alb  2.0<L>  /  TBili  0.2  /  DBili  x   /  AST  19  /  ALT  10  /  AlkPhos  83      PT/INR - ( 2022 05:44 )   PT: 17.5 sec;   INR: 1.50 ratio         PTT - ( 2022 05:44 )  PTT:23.9 sec  Urinalysis Basic - ( 2022 06:25 )    Color: Light Yellow / Appearance: Clear / S.015 / pH: x  Gluc: x / Ketone: Negative  / Bili: Negative / Urobili: Negative   Blood: x / Protein: Trace / Nitrite: Negative   Leuk Esterase: Negative / RBC: 3 /hpf / WBC 1 /HPF   Sq Epi: x / Non Sq Epi: 1 /hpf / Bacteria: Negative      CAPILLARY BLOOD GLUCOSE      POCT Blood Glucose.: 109 mg/dL (2022 11:13)  POCT Blood Glucose.: 132 mg/dL (2022 07:19)  POCT Blood Glucose.: 174 mg/dL (2022 21:22)  POCT Blood Glucose.: 252 mg/dL (2022 17:23)    LIVER FUNCTIONS - ( 2022 05:43 )  Alb: 2.0 g/dL / Pro: 5.5 g/dL / ALK PHOS: 83 U/L / ALT: 10 U/L / AST: 19 U/L / GGT: x             Culture - Blood (collected 22 @ 09:02)  Source: .Blood Blood-Peripheral  Preliminary Report (22 @ 14:01):    No growth to date.          Cultures:    Culture - Blood (collected 22 @ 09:02)  Source: .Blood Blood-Peripheral  Preliminary Report (22 @ 14:01):    No growth to date.              REVIEW OF SYSTEMS  --------------------------------------------------------------------------------  unable to assess, AMS, lethargic        PHYSICAL EXAM:  Constitutional: Well developed / well nourished  Eyes: Anicteric, PERRLA  ENMT: nc/at  Neck: supple  Respiratory: CTA   Cardiovascular: RRR  Gastrointestinal: Soft, non distended, NT, incision healed   Genitourinary: tucker   Extremities: SCD's in place and working bilaterally, RUE pitting edema  Vascular: Palpable dp pulses bilaterally  Neurological: opens eyes increased stimulation lethargic. not following commands/questions. no tremors noted  Skin: no rashes, ulcerations or lesions  Musculoskeletal: Moving all extremities, global weakness, greatest at RUE.   Psychiatric: lethargic

## 2022-06-26 NOTE — PROGRESS NOTE ADULT - ASSESSMENT
66 year old Male with PMHx ESRD s/p permacath removal 5/10/22 s/p Rt DDRT 4/21/22,  CVA (2019), Afib on apixaban, seizure activity, CAD s/p stents, CABG (2020), HTN, HLD, DM on insulin, gastric/duodenal ulcer, recent hospitalization 4/28/22 -5/10/22 with weakness/anemia found to have perinephric hematoma requiring evacuation and repair of bleeding arterial anastomosis who presented to [facility] for status epilepticus requiring intubation for hypoxic respiratory failure.    At [facility] was intubated on 6/10  On 6/11 underwent thoracentesis in context of Eliquis therapy  Developed bleeding into pleural space and require Chest tube placement  Planned for VATS    COVID19/FLU/RSV PCR (6/10) Negative  U/A (6/10) with 5 WBC and 25 RBC  Blood Cultures (6/10) NGTD  Pleural Fluid with 71 nucleated cells, 50% PMN, 31% Lymph.   Pleural Fluid Culture (6/11) Negative   CT Chest (6/12) with Right-sided chest tube with large hemothorax and atelectasis of the right lower lobe.    On 6/15 s/p uniportal R VATS, evacuation of hemothorax, pneumonolysis, decortication, intercostal nerve block.    CXR (6/21) with Rounded opacity in right midlung concerning for loculated pleural fluid or pneumonia  No leukocytosis, fevers or other clinical status changes  No obvious s/s of infection at present  Could represent residual hemothorax    Intermittent lethargic  MRI Brain (6/20) worrisome for PRES    #Hypothermia, Encephalopathy/Seizures  Lower suspicion for encephalitis/meningitis  Reasonable to pursue LP to exclude CNS infection however.   --Would cover empirically with Vancomycin and Meropenem  --Would check CMV, EBV, HHV6, Parvovirus, Adenovirus PCR  --Follow urine and blood cultures    #Residual Pleural Effusion  Thoracic Surgery input noted; no plans for further intervention  --Consider ultrasound of right effusion versus CT Chest noncontrast    #Positive Urine Culture (?UTI)  Of note, was being treated for UTI at rehab  [facility] from 6/1 with >100K ESBL Klebsiella (Cipro R but Levofloxacin S)  He received 1-2 doses of Ertapenem and was then switched to Levofloxacin  Doubt the Levofloxacin was effective therapy given Cipro resistance  s/p Ertapenem 6/14 --> 6/18    #Renal Transplant Recipient, Prophylactic Antibiotic  --Continue Bactrim for PCP PPx  --Continue Valcyte for CMV PPx    I will continue to follow. Please feel free to contact me with any further questions.    Carlton Hoover M.D.  [facility] Division of Infectious Disease  8AM-5PM Monday - Friday: Available on [employer] Teams  After Hours and Holidays (or if no response on [employer] Teams): Please contact the Infectious Diseases Office at [phone]    The above assessment and plan were discussed with Altagracia, transplant surgery NP and 8ICU Team

## 2022-06-26 NOTE — PROGRESS NOTE ADULT - ASSESSMENT
Epilepsy, intractable, with status epilepticus  Encephalopathy  ANA  Prior strokes  Atrial fibrillation  CAD  HTN  DM type 2    - Patient with continued non-convulsive electrographic seizures, refractory to current therapy. Denies any new focal neurologic deficits  - vEEG to continue to observe for seizures  - VPA level low, on maintenance with 1g IV [id number]. Please check new VPA trough (30-60 minutes before dose) and LFT's tomorrow (6/26) AM  - Started Fycompa at 4mg PO bedtime  - Started fosphenytoin with bolus of 800mg IV and then maintenance with 100mg [id number]. With lower level rebolused additional 700mg IV and will check peak level 2 hours after  - Vimpat stopped. Due to sedation would also stop standing Ativan  - Continue to address above medical problems, as you are doing  - Will continue to follow patient with you Epilepsy, intractable, with status epilepticus  Encephalopathy  S/p renal transplant, ANA  Prior strokes  Atrial fibrillation  CAD  HTN  DM type 2    - Patient with continued non-convulsive electrographic seizures, refractory to current therapy. Mental status also likely worsening. He is hypothermic with low WBC. Although CNS infection should be on the differential, especially given immunocompromised state with renal transplant, given low temperature and low WBC would favor more systemic process as opposed to primary CNS  - vEEG to continue to observe for seizures  - VPA level low, on maintenance with 1g IV [id number]. Please check new VPA trough (30-60 minutes before dose) and LFT's tomorrow (6/26) AM  - Continue Fycompa at 4mg PO bedtime  - Started fosphenytoin with bolus of 800mg IV and then maintenance with 100mg [id number]. With lower level re-bolused additional 700mg IV and will check peak level 2 hours after  - Vimpat stopped. Due to sedation would also stop standing Ativan  - Agree with holding Eliquis for now given possibly need for LP in the next few days. If no contraindications and clinical need dictates this our team will assist in this regard (likely on Tuesday - 6/28, or later)  - Continue to address above medical problems, as you are doing  - Will continue to follow patient with you Epilepsy, intractable, with status epilepticus  Encephalopathy  S/p renal transplant, ANA  Prior strokes  Atrial fibrillation  CAD  HTN  DM type 2    - Patient with continued non-convulsive electrographic seizures, refractory to current therapy. Mental status also likely worsening. He is hypothermic with low WBC. Although CNS infection should be on the differential, especially given immunocompromised state with renal transplant, given low temperature and low WBC would favor more systemic process as opposed to primary CNS  - vEEG to continue to observe for seizures  - VPA level low, on maintenance with 1g IV [id number]. Please check new VPA trough (30-60 minutes before dose) and LFT's tomorrow (6/26) AM  - Continue Fycompa at 4mg PO bedtime  - Started fosphenytoin with bolus of 800mg IV and then maintenance with 100mg [id number]. With lower level re-bolused additional 700mg IV and will check peak level 2 hours after  - Vimpat stopped. Due to sedation would also stop standing Ativan  - Agree with holding Eliquis for now given possibly need for LP in the next few days. If no contraindications and clinical need dictates this our team will assist in this regard (likely on Tuesday - 6/28)  - Would also recommend repeat MRI brain w/ and w/o as no clear evidence of infection, inflammation, or acute CNS event on prior study. 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 Mental status also likely worsening. He is hypothermic with low WBC. Although CNS infection should be on the differential, especially given immunocompromised state with renal transplant, given low temperature and low WBC would favor more systemic process as opposed to primary CNS  - vEEG to continue to observe for seizures  - VPA level low, on maintenance with 1g IV [id number]. Please check new VPA and phenytoin trough (30-60 minutes before dose) and LFT's tomorrow (6/27) AM  - Continue Fycompa at 4mg PO bedtime  - Started fosphenytoin with bolus of 800mg IV and then maintenance with 100mg [id number]. With lower level re-bolused additional 700mg IV and will check peak level 2 hours after  - Vimpat stopped. Due to sedation would also stop standing Ativan  - Agree with holding Eliquis for now given possibly need for LP in the next few days. If no contraindications and clinical need dictates this our team will assist in this regard (likely on Tuesday - 6/28)  - Would also recommend repeat MRI brain w/ and w/o as no clear evidence of infection, inflammation, or acute CNS event on prior study. 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

## 2022-06-26 NOTE — PROGRESS NOTE ADULT - SUBJECTIVE AND OBJECTIVE BOX
Dr. Barrientos  Office [phone] (9 am to 5 pm)  Service: [phone] (5pm to 9am)  Karolina HAAS      RENAL PROGRESS NOTE: DATE OF SERVICE 06-26-22 @ 15:29    Patient is a 66y old  Male who presents with a chief complaint of Status Epilepticus (26 Jun 2022 13:09)      Patient seen and examined at bedside. Lethargic, getting EEG monitring    VITALS:  T(F): 97.9 (06-26-22 @ 15:00), Max: 97.9 (06-26-22 @ 15:00)  HR: 52 (06-26-22 @ 15:00)  BP: 166/78 (06-26-22 @ 15:00)  RR: 20 (06-26-22 @ 15:00)  SpO2: 100% (06-26-22 @ 15:00)  Wt(kg): --    06-25 @ 07:01  -  06-26 @ 07:00  --------------------------------------------------------  IN: 1265 mL / OUT: 1600 mL / NET: -335 mL    06-26 @ 07:01  -  06-26 @ 15:29  --------------------------------------------------------  IN: 908 mL / OUT: 635 mL / NET: 273 mL          PHYSICAL EXAM:  Constitutional: NAD  Neck: No JVD  Respiratory: CTAB, no wheezes, rales or rhonchi  Cardiovascular: S1, S2, RRR  Gastrointestinal: BS+, soft, NT/ND  Extremities: No peripheral edema    Hospital Medications:   MEDICATIONS  (STANDING):  atorvastatin 40 milliGRAM(s) Oral at bedtime  chlorhexidine 2% Cloths 1 Application(s) Topical <User Schedule>  epoetin cortney-epbx (RETACRIT) Injectable 84378 Unit(s) SubCutaneous every 7 days  fosphenytoin IVPB 100 milliGRAM(s) PE IV Intermittent every 8 hours  heparin  Infusion 800 Unit(s)/Hr (8 mL/Hr) IV Continuous <Continuous>  insulin glargine Injectable (LANTUS) 4 Unit(s) SubCutaneous at bedtime  insulin lispro (ADMELOG) corrective regimen sliding scale   SubCutaneous three times a day before meals  insulin lispro (ADMELOG) corrective regimen sliding scale   SubCutaneous at bedtime  lactated ringers. 1000 milliLiter(s) (75 mL/Hr) IV Continuous <Continuous>  levothyroxine Injectable 40 MICROGram(s) IV Push at bedtime  magnesium oxide 400 milliGRAM(s) Oral every 8 hours  methylPREDNISolone sodium succinate Injectable 4 milliGRAM(s) IV Push daily  multivitamin 1 Tablet(s) Oral daily  NIFEdipine XL 60 milliGRAM(s) Oral daily  nystatin    Suspension 048976 Unit(s) Oral four times a day  pantoprazole  Injectable 40 milliGRAM(s) IV Push daily  perampanel 4 milliGRAM(s) Oral at bedtime  senna 2 Tablet(s) Oral at bedtime  trimethoprim   80 mG/sulfamethoxazole 400 mG 1 Tablet(s) Oral <User Schedule>  valGANciclovir 450 milliGRAM(s) Oral <User Schedule>  valproate sodium  IVPB 1000 milliGRAM(s) IV Intermittent every 8 hours      LABS:  06-26    139  |  104  |  51<H>  ----------------------------<  121<H>  4.0   |  25  |  1.89<H>    Ca    8.3<L>      26 Jun 2022 05:43  Phos  3.8     06-26  Mg     2.2     06-26    TPro  5.5<L>  /  Alb  2.0<L>  /  TBili  0.2  /  DBili      /  AST  19  /  ALT  10  /  AlkPhos  83  06-26    Creatinine Trend: 1.89 <--, 2.11 <--, 1.96 <--, 1.73 <--, 1.75 <--, 1.46 <--, 1.53 <--, 1.54 <--    Albumin, Serum: 2.0 g/dL (06-26 @ 05:43)  Phosphorus Level, Serum: 3.8 mg/dL (06-26 @ 05:43)                              7.9    3.12  )-----------( 313      ( 26 Jun 2022 05:43 )             25.0     Urine Studies:  Urinalysis - [06-26-22 @ 06:25]      Color Light Yellow / Appearance Clear / SG 1.015 / pH 7.0      Gluc Negative / Ketone Negative  / Bili Negative / Urobili Negative       Blood Negative / Protein Trace / Leuk Est Negative / Nitrite Negative      RBC 3 / WBC 1 / Hyaline 0 / Gran  / Sq Epi  / Non Sq Epi 1 / Bacteria Negative    Urine Creatinine 52      [06-25-22 @ 14:10]  Urine Protein 24      [06-25-22 @ 14:10]  Urine Sodium 68      [06-25-22 @ 14:10]  Urine Osmolality 375      [06-25-22 @ 14:10]    Iron 17, TIBC 171, %sat 10      [05-02-22 @ 13:03]  Ferritin 1505      [05-02-22 @ 13:05]  PTH -- (Ca 9.2)      [02-05-22 @ 10:13]   294  HbA1c 6.0      [02-21-20 @ 08:53]  Lipid: chol 118, TG 54, HDL 59, LDL --      [06-27-21 @ 09:44]        RADIOLOGY & ADDITIONAL STUDIES:

## 2022-06-26 NOTE — PROGRESS NOTE ADULT - ASSESSMENT
66 year old male S/p DDRT on 4/21/22 Cr ~  2 range. (Donor was 58 , KDPI 82%, DCD, single vessels and ureter, HLA mismatch 1, 2, 2. No DSA, cPRA 0%. CMV +/+)  Course complicated by DGF, was on HD until 4/29/22. Readmitted in May for anemia in the setting of large perinephric hematoma s/p evacuation and repair of arterial anastomosis on 5/2/22. Intra operative biopsy showed no rejection. This admission he presented from rehab 6/10 with status epilepticus secondary to likely UTI, intubated for airway protection, s/p right thoracentesis complicated by hemothorax.    1.s/p DDRT on 4/21/22 Cr ~ 2 range.   2. IS meds- s/p Thymo induction. CNI was d/c as MRI concerning for PRES and increased seizure activity. Started on Belatacept. C/w prednisone 5mg daily, Cellcept  1gm BID. C/w Valcyte, Nystatin and bactrim.  3. Seizures, AMS- On 24 EEG monitoring now . Appreciate neuro recs.  4. Hypothermia,  Leucopenia - Pan culture, Resp viral panel,  CXR, recommend obtaining an LP   Check TSH

## 2022-06-26 NOTE — CHART NOTE - NSCHARTNOTEFT_GEN_A_CORE
Neurology team following on AED recs    Recommend:  [] DC vimpat   [] Start fospheny IVPB 100 mg q8  [] Would obtain fospheny level in AM in addition to VPA  [] Would obtain one time ammonia  [] Please call [phone] for any questions    Discussed with epilepsy fellow Dr. Charanjit Maloney  Neurology PGY 2

## 2022-06-26 NOTE — PROGRESS NOTE ADULT - SUBJECTIVE AND OBJECTIVE BOX
Follow Up:      Interval History:    REVIEW OF SYSTEMS  [  ] ROS unobtainable because:    [  ] All other systems negative except as noted below    Constitutional:  [ ] fever [ ] chills  [ ] weight loss  [ ] weakness  Skin:  [ ] rash [ ] phlebitis	  Eyes: [ ] icterus [ ] pain  [ ] discharge	  ENMT: [ ] sore throat  [ ] thrush [ ] ulcers [ ] exudates  Respiratory: [ ] dyspnea [ ] hemoptysis [ ] cough [ ] sputum	  Cardiovascular:  [ ] chest pain [ ] palpitations [ ] edema	  Gastrointestinal:  [ ] nausea [ ] vomiting [ ] diarrhea [ ] constipation [ ] pain	  Genitourinary:  [ ] dysuria [ ] frequency [ ] hematuria [ ] discharge [ ] flank pain  [ ] incontinence  Musculoskeletal:  [ ] myalgias [ ] arthralgias [ ] arthritis  [ ] back pain  Neurological:  [ ] headache [ ] seizures  [ ] confusion/altered mental status    Allergies  No Known Allergies        ANTIMICROBIALS:  fluconAZOLE IVPB 400 once  meropenem  IVPB 1000 every 12 hours  nystatin    Suspension 382106 four times a day  trimethoprim   80 mG/sulfamethoxazole 400 mG 1 <User Schedule>  valGANciclovir 450 <User Schedule>      OTHER MEDS:  MEDICATIONS  (STANDING):  atorvastatin 40 at bedtime  epoetin cortney-epbx (RETACRIT) Injectable 47583 every 7 days  fentaNYL   Infusion.. 0.5 <Continuous>  fosphenytoin IVPB 100 every 8 hours  heparin  Infusion 800 <Continuous>  insulin lispro (ADMELOG) corrective regimen sliding scale  every 6 hours  levothyroxine Injectable 40 at bedtime  methylPREDNISolone sodium succinate Injectable 4 daily  pantoprazole  Injectable 40 daily  perampanel 4 at bedtime  senna 2 at bedtime  valproate sodium  IVPB 1000 every 8 hours      Vital Signs Last 24 Hrs  T(C): 36.6 (2022 15:00), Max: 36.6 (2022 15:00)  T(F): 97.9 (2022 15:00), Max: 97.9 (2022 15:00)  HR: 43 (2022 16:00) (43 - 62)  BP: 130/55 (2022 16:00) (114/68 - 192/78)  BP(mean): 85 (2022 16:00) (84 - 112)  RR: 20 (2022 16:00) (13 - 26)  SpO2: 100% (2022 16:00) (96% - 100%)    PHYSICAL EXAMINATION:  General: Alert and Awake, NAD  HEENT: PERRL, EOMI  Neck: Supple  Cardiac: RRR, No M/R/G  Resp: CTAB, No Wh/Rh/Ra  Abdomen: NBS, NT/ND, No HSM, No rigidity or guarding  MSK: No LE edema. No Calf tenderness  : No tucker  Skin: No rashes or lesions. Skin is warm and dry to the touch.   Neuro: Alert and Awake. CN 2-12 Grossly intact. Moves all four extremities spontaneously.  Psych: Calm, Pleasant, Cooperative                          7.9    3.12  )-----------( 313      ( 2022 05:43 )             25.0           139  |  104  |  51<H>  ----------------------------<  121<H>  4.0   |  25  |  1.89<H>    Ca    8.3<L>      2022 05:43  Phos  3.8       Mg     2.2         TPro  5.5<L>  /  Alb  2.0<L>  /  TBili  0.2  /  DBili  x   /  AST  19  /  ALT  10  /  AlkPhos  83        Urinalysis Basic - ( 2022 06:25 )    Color: Light Yellow / Appearance: Clear / S.015 / pH: x  Gluc: x / Ketone: Negative  / Bili: Negative / Urobili: Negative   Blood: x / Protein: Trace / Nitrite: Negative   Leuk Esterase: Negative / RBC: 3 /hpf / WBC 1 /HPF   Sq Epi: x / Non Sq Epi: 1 /hpf / Bacteria: Negative        MICROBIOLOGY:  v  .Blood Blood-Peripheral  22   No Growth Final  --  --      .Blood Blood  22   No Growth Final  --  --      .Body Fluid Pleural Fluid  22   No growth at 5 days  --    Few polymorphonuclear leukocytes seen  No organisms seen      .Blood Blood  06-10-22   No Growth Final  --  --      .Blood Blood  06-10-22   No Growth Final  --  --                RADIOLOGY:    <The imaging below has been reviewed and visualized by me independently. Findings as detailed in report below> PHYSICAL THERAPY EVALUATION - INPATIENT     Room Number: 451/451-A  Evaluation Date: 2/1/2017  Type of Evaluation: Initial  Physician Order: PT Eval and Treat    Presenting Problem: S/p HIPEC on 1/31/17  Reason for Therapy: Mobility Dysfunction and Dis mechanics;Repositioning; Activity promotion    COGNITION  · Overall Cognitive Status:  Impaired  · Arousal/Alertness:  lethargic  · Memory:  mildly confused during session, but quickly would reorient w/ cues    RANGE OF MOTION AND STRENGTH ASSESSMENT  Upper ABILITY STATUS  Gait Assessment   Gait Assistance: Dependent assistance (Based on FIM scale per dept protocol)  Distance (ft): 3  Assistive Device: Rolling walker  Pattern: Shuffle          Skilled Therapy Provided: Pt completed supine to sit w/ hob elevat Follow Up:  Hypothermia    Interval History: seizures over last 48H. hypothermic this AM.     REVIEW OF SYSTEMS  [  ] ROS unobtainable because:    [ x ] All other systems negative except as noted below    Constitutional:  [ ] fever [ ] chills  [ ] weight loss  [ ] weakness  Skin:  [ ] rash [ ] phlebitis	  Eyes: [ ] icterus [ ] pain  [ ] discharge	  ENMT: [ ] sore throat  [ ] thrush [ ] ulcers [ ] exudates  Respiratory: [ ] dyspnea [ ] hemoptysis [ ] cough [ ] sputum	  Cardiovascular:  [ ] chest pain [ ] palpitations [ ] edema	  Gastrointestinal:  [ ] nausea [ ] vomiting [ ] diarrhea [ ] constipation [ ] pain	  Genitourinary:  [ ] dysuria [ ] frequency [ ] hematuria [ ] discharge [ ] flank pain  [ ] incontinence  Musculoskeletal:  [ ] myalgias [ ] arthralgias [ ] arthritis  [ ] back pain  Neurological:  [ ] headache [ ] seizures  [ x confusion/altered mental status    Allergies  No Known Allergies        ANTIMICROBIALS:  fluconAZOLE IVPB 400 once  meropenem  IVPB 1000 every 12 hours  nystatin    Suspension [id number] four times a day  trimethoprim   80 mG/sulfamethoxazole 400 mG 1 <User Schedule>  valGANciclovir 450 <User Schedule>      OTHER MEDS:  MEDICATIONS  (STANDING):  atorvastatin 40 at bedtime  epoetin cortney-epbx (RETACRIT) Injectable 00001 every 7 days  fentaNYL   Infusion.. 0.5 <Continuous>  fosphenytoin IVPB 100 every 8 hours  heparin  Infusion 800 <Continuous>  insulin lispro (ADMELOG) corrective regimen sliding scale  every 6 hours  levothyroxine Injectable 40 at bedtime  methylPREDNISolone sodium succinate Injectable 4 daily  pantoprazole  Injectable 40 daily  perampanel 4 at bedtime  senna 2 at bedtime  valproate sodium  IVPB 1000 every 8 hours      Vital Signs Last 24 Hrs  T(C): 36.6 (2022 15:00), Max: 36.6 (2022 15:00)  T(F): 97.9 (2022 15:00), Max: 97.9 (2022 15:00)  HR: 43 (2022 16:00) (43 - 62)  BP: 130/55 (2022 16:00) (114/68 - 192/78)  BP(mean): 85 (2022 16:00) (84 - 112)  RR: 20 (2022 16:00) (13 - 26)  SpO2: 100% (2022 16:00) (96% - 100%)    PHYSICAL EXAMINATION:  General: Alert and Awake, NAD  Cardiac: RRR, No M/R/G  Resp: CTAB, No Wh/Rh/Ra  Abdomen: NBS, NT/ND, No HSM, No rigidity or guarding  MSK: No LE edema. No Calf tenderness  Skin: No rashes or lesions. Skin is warm and dry to the touch.   Neuro: Alert and Awake. CN 2-12 Grossly intact. Moves all four extremities spontaneously.  Psych: Calm, Pleasant, Cooperative                          7.9    3.12  )-----------( 313      ( 2022 05:43 )             25.0           139  |  104  |  51<H>  ----------------------------<  121<H>  4.0   |  25  |  1.89<H>    Ca    8.3<L>      2022 05:43  Phos  3.8       Mg     2.2         TPro  5.5<L>  /  Alb  2.0<L>  /  TBili  0.2  /  DBili  x   /  AST  19  /  ALT  10  /  AlkPhos  83        Urinalysis Basic - ( 2022 06:25 )    Color: Light Yellow / Appearance: Clear / S.015 / pH: x  Gluc: x / Ketone: Negative  / Bili: Negative / Urobili: Negative   Blood: x / Protein: Trace / Nitrite: Negative   Leuk Esterase: Negative / RBC: 3 /hpf / WBC 1 /HPF   Sq Epi: x / Non Sq Epi: 1 /hpf / Bacteria: Negative        MICROBIOLOGY:  v  .Blood Blood-Peripheral  22   No Growth Final  --  --      .Blood Blood  22   No Growth Final  --  --      .Body Fluid Pleural Fluid  22   No growth at 5 days  --    Few polymorphonuclear leukocytes seen  No organisms seen      .Blood Blood  06-10-22   No Growth Final  --  --      .Blood Blood  06-10-22   No Growth Final  --  --    RADIOLOGY:    <The imaging below has been reviewed and visualized by me independently. Findings as detailed in report below>    < from: Xray Chest 1 View- PORTABLE-Urgent (Xray Chest 1 View- PORTABLE-Urgent .) (22 @ 12:16) >  IMPRESSION:    Small right pleural effusion is unchanged rounded opacity of the minor   fissure favored to represent loculated fluid.  Decreased left midlung opacity compared with prior.    < end of copied text >   assist patient in returning to prior level of function. DISCHARGE RECOMMENDATIONS  PT Discharge Recommendations: Home    PLAN  PT Treatment Plan: Bed mobility; Body mechanics; Endurance; Patient education; Family education;Gait training;Strengthening;Klein

## 2022-06-26 NOTE — PROVIDER CONTACT NOTE (CHANGE IN STATUS NOTIFICATION) - ASSESSMENT
Patient has increased lethargy, opens eyes to sternal rub, not withdrawing to pain in all extremities, Pupils [id number],

## 2022-06-26 NOTE — CHART NOTE - NSCHARTNOTEFT_GEN_A_CORE
At pt's bedside to place ultrasound guided IV and attempt to place NGT for meds and enteral feeds. Pt more lethargic than this AM, difficult to arouse with sternal rub. Still following some simple commands. Pt with weak wet sounding cough, perform nasal trumpet deep suctioning and obtained a lot of secretions. Due to concern for pt's ability to protect his airway in setting of his poor mental status, decision made to intubate pt. Pt's son at bedside for above events and agreed with plan to intubate. Anesthesia called, pt intubated with RSI using GlideScope with 7.5 ETT. Dr. Hunter aware of events.

## 2022-06-26 NOTE — EEG REPORT - NS EEG TEXT BOX
[facility]   COMPREHENSIVE EPILEPSY CENTER   REPORT OF CONTINUOUS VIDEO EEG     [facility]: [address], [place] [address], Ph#: [phone]  [facility]: [address], [place] [address], Ph#: [phone]  [facility]: [address] [place] [address], Ph#: [phone]    Patient Name: HCAD DUNBAR  Age and : 66y ([birth year])  MRN #: [id number]  Location: [address]   Referring Physician: Lizz Davis    Start Time/Date: 08:00 on 22  End Time/Date: 08:00 on 22  Duration: 15 hours 44 mins  _____________________________________________________________  STUDY INFORMATION    EEG Recording Technique:  The patient underwent continuous Video-EEG monitoring, using Telemetry System hardware on the XLTek Digital System. EEG and video data were stored on a computer hard drive with important events saved in digital archive files. The material was reviewed by a physician (electroencephalographer / epileptologist) on a daily basis. Ramon and seizure detection algorithms were utilized and reviewed. An EEG Technician attended to the patient, and was available throughout daytime work hours.  The epilepsy center neurologist was available in person or on call 24-hours per day.    EEG Placement and Labeling of Electrodes:  The EEG was performed utilizing 20 channel referential EEG connections (coronal over temporal over parasagittal montage) using all standard 10-20 electrode placements with EKG, with additional electrodes placed in the inferior temporal region using the modified 10-10 montage electrode placements for elective admissions, or if deemed necessary. Recording was at a sampling rate of 256 samples per second per channel. Time synchronized digital video recording was done simultaneously with EEG recording. A low light infrared camera was used for low light recording.     _____________________________________________________________  HISTORY  Patient is a 66y old  Male who presents with a chief complaint of Status Epilepticus (2022 08:44)    PERTINENT MEDICATION:  lacosamide IVPB 100 milliGRAM(s) IV Intermittent every 12 hours  valproate sodium  IVPB 750 milliGRAM(s) IV Intermittent every 8 hours  _____________________________________________________________  STUDY INTERPRETATION    Findings: The background was continuous and reactive. During wakefulness, the posterior dominant rhythm consisted of symmetric, well-modulated 8.5Hz activity, with amplitude to 30 uV, that attenuated to eye opening.      Background Slowing:  No generalized background slowing was present.    Focal Slowing:   Continuous polymorphic theta/delta slowing over the left frontotemporal region.    Sleep Background:  Drowsiness was characterized by fragmentation, attenuation, and slowing of the background activity.    Sleep transients were recorded.    Other Non-Epileptiform Findings:  None were present.    Interictal Epileptiform Activity:   Occasional spike waves over left frontocentral region.    Seizures:   Numerous (~9-12 per hour) focal seizures recorded from left frontal region with centrotemporal spread. Ictal EEG onset with rhythmical 14hz beta over F3 intermixed with beta, rapid involvement of C3 region, evolving to rhythmical 3Hz delta +F, changing back to rhythmical beta, and slowing to repetitive spike wave discharges prior to offset.   In other brief seizure subset, ictal onset appeared to be from left frontopolar Fp1 with rhythmical beta with rapid spread to anterior temporal region.  Seizures appear to koki after 21:25 with addition of fosphenytoin.   There was no clear clinical correlate (patient's right side is not visible on video). Approx duration 1-2 mins.     Activation Procedures:   Hyperventilation was not performed.    Photic stimulation was not performed.     Artifacts:  Intermittent myogenic and movement artifacts were noted.    ECG:  The heart rate on single channel ECG was predominantly between 60-70 BPM.    _____________________________________________________________  EEG SUMMARY/CLASSIFICATION    Abnormal EEG in the awake, drowsy and asleep states.    - Numerous (~9-12 per hour) electrographic seizures recorded from left frontal region, .  - Occasional spike waves over left frontocentral region.  - Continuous polymorphic theta/delta slowing over the left frontotemporal region.    _____________________________________________________________  EEG IMPRESSION/CLINICAL CORRELATE    Abnormal EEG study.  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  - Structural / functional abnormality in the left frontocentral region.  _____________________________________________________________    Gregorio Donohue MD, PEREZ  Fellow | [facility] Epilepsy [facility]         [facility] EEG Reading Room Ph#: [phone]  Epilepsy Answering Service after 5PM and before 8:30AM: Ph#: [phone].       [facility]   COMPREHENSIVE EPILEPSY CENTER   REPORT OF CONTINUOUS VIDEO EEG     [facility]: [address], [place] [address], Ph#: [phone]  [facility]: [address], [place] [address], Ph#: [phone]  [facility]: [address] [place] [address], Ph#: [phone]    Patient Name: CHAD DUNBAR  Age and : 66y ([birth year])  MRN #: [id number]  Location: [address]   Referring Physician: Lizz Davis    Start Time/Date: 08:00 on 22  End Time/Date: 08:00 on 22  Duration: 15 hours 44 mins  _____________________________________________________________  STUDY INFORMATION    EEG Recording Technique:  The patient underwent continuous Video-EEG monitoring, using Telemetry System hardware on the XLTek Digital System. EEG and video data were stored on a computer hard drive with important events saved in digital archive files. The material was reviewed by a physician (electroencephalographer / epileptologist) on a daily basis. Ramon and seizure detection algorithms were utilized and reviewed. An EEG Technician attended to the patient, and was available throughout daytime work hours.  The epilepsy center neurologist was available in person or on call 24-hours per day.    EEG Placement and Labeling of Electrodes:  The EEG was performed utilizing 20 channel referential EEG connections (coronal over temporal over parasagittal montage) using all standard 10-20 electrode placements with EKG, with additional electrodes placed in the inferior temporal region using the modified 10-10 montage electrode placements for elective admissions, or if deemed necessary. Recording was at a sampling rate of 256 samples per second per channel. Time synchronized digital video recording was done simultaneously with EEG recording. A low light infrared camera was used for low light recording.     _____________________________________________________________  HISTORY  Patient is a 66y old  Male who presents with a chief complaint of Status Epilepticus (2022 08:44)    PERTINENT MEDICATION:  lacosamide IVPB 100 milliGRAM(s) IV Intermittent every 12 hours  valproate sodium  IVPB 750 milliGRAM(s) IV Intermittent every 8 hours  _____________________________________________________________  STUDY INTERPRETATION    Findings: The background was continuous and reactive. During wakefulness, the posterior dominant rhythm consisted of symmetric, well-modulated 8.5Hz activity, with amplitude to 30 uV, that attenuated to eye opening.      Background Slowing:  No generalized background slowing was present.    Focal Slowing:   Continuous polymorphic theta/delta slowing over the left frontotemporal region.    Sleep Background:  Drowsiness was characterized by fragmentation, attenuation, and slowing of the background activity.    Sleep transients were recorded.    Other Non-Epileptiform Findings:  None were present.    Interictal Epileptiform Activity:   Occasional spike waves over left frontocentral region.    Seizures:   Numerous (~9-12 per hour) focal seizures recorded from left frontal region with centrotemporal spread. Ictal EEG onset with rhythmical 14hz beta over F3 intermixed with beta, rapid involvement of C3 region, evolving to rhythmical 3Hz delta +F, changing back to rhythmical beta, and slowing to repetitive spike wave discharges prior to offset.   In other brief seizure subset, ictal onset appeared to be from left frontopolar Fp1 with rhythmical beta with rapid spread to anterior temporal region.  Seizures appear to koki after 21:25 with addition of fosphenytoin.   There was no clear clinical correlate (patient's right side is not visible on video). Approx duration 1-2 mins.     Activation Procedures:   Hyperventilation was not performed.    Photic stimulation was not performed.     Artifacts:  Intermittent myogenic and movement artifacts were noted.    ECG:  The heart rate on single channel ECG was predominantly between 60-70 BPM.    _____________________________________________________________  EEG SUMMARY/CLASSIFICATION    Abnormal EEG in the awake, drowsy and asleep states.    - Numerous (~9-12 per hour) electrographic seizures recorded from left frontal region, .  - Occasional spike waves over left frontocentral region.  - Continuous polymorphic theta/delta slowing over the left frontotemporal region.    _____________________________________________________________  EEG IMPRESSION/CLINICAL CORRELATE    Abnormal EEG study.  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  - Structural / functional abnormality in the left frontocentral region.    above d/w 8ICU and neurology teams at time of immediate review.  _____________________________________________________________    Gregorio Donohue MD, PEREZ  Fellow | [facility] Epilepsy Center         [facility] EEG Reading Room Ph#: [phone]  Epilepsy Answering Service after 5PM and before 8:30AM: Ph#: [phone].       [facility]   COMPREHENSIVE EPILEPSY CENTER   REPORT OF CONTINUOUS VIDEO EEG     [facility]: [address], [place] [address], Ph#: [phone]  [facility]: [address], [place] [address], Ph#: [phone]  [facility]: [address] [place] [address], Ph#: [phone]    Patient Name: CHAD DUNBAR  Age and : 66y ([birth year])  MRN #: [id number]  Location: [address]   Referring Physician: Lizz Davis    Start Time/Date: 08:00 on 22  End Time/Date: 08:00 on 22  Duration: 21 hours 00 mins  _____________________________________________________________  STUDY INFORMATION    EEG Recording Technique:  The patient underwent continuous Video-EEG monitoring, using Telemetry System hardware on the XLTek Digital System. EEG and video data were stored on a computer hard drive with important events saved in digital archive files. The material was reviewed by a physician (electroencephalographer / epileptologist) on a daily basis. Ramon and seizure detection algorithms were utilized and reviewed. An EEG Technician attended to the patient, and was available throughout daytime work hours.  The epilepsy center neurologist was available in person or on call 24-hours per day.    EEG Placement and Labeling of Electrodes:  The EEG was performed utilizing 20 channel referential EEG connections (coronal over temporal over parasagittal montage) using all standard 10-20 electrode placements with EKG, with additional electrodes placed in the inferior temporal region using the modified 10-10 montage electrode placements for elective admissions, or if deemed necessary. Recording was at a sampling rate of 256 samples per second per channel. Time synchronized digital video recording was done simultaneously with EEG recording. A low light infrared camera was used for low light recording.     _____________________________________________________________  HISTORY  Patient is a 66y old  Male who presents with a chief complaint of Status Epilepticus (2022 08:44)    PERTINENT MEDICATION:  lacosamide IVPB 100 milliGRAM(s) IV Intermittent every 12 hours  valproate sodium  IVPB 750 milliGRAM(s) IV Intermittent every 8 hours  _____________________________________________________________  STUDY INTERPRETATION    Findings: The background was continuous and reactive. During wakefulness, the posterior dominant rhythm consisted of symmetric, well-modulated 8.5Hz activity, with amplitude to 30 uV, that attenuated to eye opening.      Background Slowing:  No generalized background slowing was present.    Focal Slowing:   Continuous polymorphic theta/delta slowing over the left frontotemporal region.    Sleep Background:  Drowsiness was characterized by fragmentation, attenuation, and slowing of the background activity.    Sleep transients were recorded.    Other Non-Epileptiform Findings:  None were present.    Interictal Epileptiform Activity:   Occasional spike waves over left frontocentral region.    Seizures:   Numerous (~9-12 per hour) focal seizures recorded from left frontal region with centrotemporal spread. Ictal EEG onset with rhythmical 14hz beta over F3 intermixed with beta, rapid involvement of C3 region, evolving to rhythmical 3Hz delta +F, changing back to rhythmical beta, and slowing to repetitive spike wave discharges prior to offset.   In other brief seizure subset, ictal onset appeared to be from left frontopolar Fp1 with rhythmical beta with rapid spread to anterior temporal region.  Seizures appear to koki after 21:25 with addition of fosphenytoin.   There was no clear clinical correlate (patient's right side is not visible on video). Approx duration 1-2 mins.     Activation Procedures:   Hyperventilation was not performed.    Photic stimulation was not performed.     Artifacts:  Intermittent myogenic and movement artifacts were noted.    ECG:  The heart rate on single channel ECG was predominantly between 60-70 BPM.    _____________________________________________________________  EEG SUMMARY/CLASSIFICATION    Abnormal EEG in the awake, drowsy and asleep states.    - Numerous (~9-12 per hour) electrographic seizures recorded from left frontal region, .  - Occasional spike waves over left frontocentral region.  - Continuous polymorphic theta/delta slowing over the left frontotemporal region.    _____________________________________________________________  EEG IMPRESSION/CLINICAL CORRELATE    Abnormal EEG study.  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  - Cortical irritability in the left frontocentral region.  - Structural / functional abnormality in the left frontocentral region.    above d/w 8ICU and neurology teams at time of immediate review.  _____________________________________________________________    Gregorio Donohue MD, PEREZ  Fellow | [facility] Comprehensive Epilepsy Center         [facility] EEG Reading Room Ph#: [phone]  Epilepsy Answering Service after 5PM and before 8:30AM: Ph#: [phone].       [facility]   COMPREHENSIVE EPILEPSY CENTER   REPORT OF CONTINUOUS VIDEO EEG     [facility]: [address], [place] [address], Ph#: [phone]  [facility]: [address], [place] [address], Ph#: [phone]  [facility]: [address] [place] [address], Ph#: [phone]    Patient Name: CHAD DUNBAR  Age and : 66y ([birth year])  MRN #: [id number]  Location: [address]   Referring Physician: Lizz Davis    Start Time/Date: 08:00 on 22  End Time/Date: 08:00 on 22  Duration: 21 hours 00 mins  _____________________________________________________________  STUDY INFORMATION    EEG Recording Technique:  The patient underwent continuous Video-EEG monitoring, using Telemetry System hardware on the XLTek Digital System. EEG and video data were stored on a computer hard drive with important events saved in digital archive files. The material was reviewed by a physician (electroencephalographer / epileptologist) on a daily basis. Ramon and seizure detection algorithms were utilized and reviewed. An EEG Technician attended to the patient, and was available throughout daytime work hours.  The epilepsy center neurologist was available in person or on call 24-hours per day.    EEG Placement and Labeling of Electrodes:  The EEG was performed utilizing 20 channel referential EEG connections (coronal over temporal over parasagittal montage) using all standard 10-20 electrode placements with EKG, with additional electrodes placed in the inferior temporal region using the modified 10-10 montage electrode placements for elective admissions, or if deemed necessary. Recording was at a sampling rate of 256 samples per second per channel. Time synchronized digital video recording was done simultaneously with EEG recording. A low light infrared camera was used for low light recording.     _____________________________________________________________  HISTORY  Patient is a 66y old  Male who presents with a chief complaint of Status Epilepticus (2022 08:44)    PERTINENT MEDICATION:  lacosamide IVPB 100 milliGRAM(s) IV Intermittent every 12 hours  valproate sodium  IVPB 750 milliGRAM(s) IV Intermittent every 8 hours  _____________________________________________________________  STUDY INTERPRETATION    Findings: The background was continuous and reactive. During wakefulness, the posterior dominant rhythm consisted of symmetric, well-modulated 8.5Hz activity, with amplitude to 30 uV, that attenuated to eye opening.      Background Slowing:  No generalized background slowing was present.    Focal Slowing:   Continuous polymorphic theta/delta slowing over the left frontotemporal region.    Sleep Background:  Drowsiness was characterized by fragmentation, attenuation, and slowing of the background activity.    Sleep transients were recorded.    Other Non-Epileptiform Findings:  None were present.    Interictal Epileptiform Activity:   Occasional spike waves over left frontocentral region.    Seizures:   Numerous (~9-12 per hour) focal seizures recorded from left frontal region with centrotemporal spread. Ictal EEG onset with rhythmical 14hz beta over F3 intermixed with beta, rapid involvement of C3 region, evolving to rhythmical 3Hz delta +F, changing back to rhythmical beta, and slowing to repetitive spike wave discharges prior to offset.   In other brief seizure subset, ictal onset appeared to be from left frontopolar Fp1 with rhythmical beta with rapid spread to anterior temporal region.  There was no clear clinical correlate (patient's right side is not visible on video). Approx duration 1-2 mins.   Seizures appear to koki after 21:25 with addition of fosphenytoin.     Activation Procedures:   Hyperventilation was not performed.    Photic stimulation was not performed.     Artifacts:  Intermittent myogenic and movement artifacts were noted.    ECG:  The heart rate on single channel ECG was predominantly between 60-70 BPM.    _____________________________________________________________  EEG SUMMARY/CLASSIFICATION    Abnormal EEG in the awake, drowsy and asleep states.    - Numerous (~9-12 per hour) electrographic clinically subtle seizures recorded from left frontal region, .  - Occasional spike waves over left frontocentral region.  - Continuous polymorphic theta/delta slowing over the left frontotemporal region.    _____________________________________________________________  EEG IMPRESSION/CLINICAL CORRELATE    Abnormal EEG study.  - Non-convulsive status epilepticus (NCSE) from left frontocentral region, with apparent resolution around 21:25.  - Cortical irritability in the left frontocentral region.  - Structural / functional abnormality in the left frontocentral region.    Above d/w 8ICU and neurology teams at time of immediate review.  _____________________________________________________________    Gregorio Donohue MD, PEREZ  Fellow | [facility] Comprehensive Epilepsy Center         [facility] EEG Reading Room Ph#: [phone]  Epilepsy Answering Service after 5PM and before 8:30AM: Ph#: [phone].

## 2022-06-26 NOTE — PROGRESS NOTE ADULT - ASSESSMENT
Patient is a 65 y/o male w/ a PMHx of CAD s/p CABG (2020), AF on Eliquis c/b CVA (2019) c/b seizures, HTN, HLD, DM type II, hypothyroidism, GI bleed due to a duodenal ulcer (2/2022), and ESRD s/p DDRT (4/21/22) c/b DGF requiring HD (last session 4/29/22) & large perinephric hematoma s/p evacuation w/ revision of arterial anastomosis (5/2/22) who presented on 6/10 for status epilepticus. Course complicated by hemothorax, s/p VATS, now with recurrent refractory seizures likely secondary to tacrolimus toxicity with concern for PRES syndrome.      Neuro: refractory seizures; PRES syndrome  -MS: waxes and wanes, opens eyes, follows commands, no focal deficit  - pain control w/ tylenol  - continue vEEG  - antiepileptics: vimpat, valproate, Fycompa  - given 1mg Ativan on arrival, continue Ativan 0.5mg q6    Resp: s/p VATS evacuation of hemothorax  - chest tubes removed  - Out of bed to chair, ambulate as tolerated, and incentive spirometry to prevent atelectasis  - keep sats >92%      CVS: hypertension  - continue Nifedipine, Hydralazine, Coreg  - atorvastatin 40mg    GI:   - carb consistent diet, soft with thick liquids  - Bowel regimen with senna & Miralax  - continue home protonix    Renal: ESRD s/p DDRT (4/21/22) c/b DGF requiring HD (last session 4/29/22) & large perinephric hematoma s/p evacuation w/ revision of arterial anastomosis (5/2/22)   - ANA  - Monitor I&Os and electrolytes  - replete electrolytes as needed   - mycophenolate 1g BID, prednisone 5mg daily    Heme: RUE and RIJ DVT  - continue Eliquis 5 q12  - Monitor CBC and coags   - SCD's    ID: ppx  - Monitor for clinical evidence of active infection  - Monitor WBC, temperature, and procalcitonin  - AntibiotiCs: emp [id number] q24h, and fluconazole  - ppx ABX: bactrim and valganciclovir  - infectious workup pending    Endo: hx of DM, Hypothyroidism  - ISS qhs , lantus 4U and 4U premeal admelog  - for HLD : atorvastatin 40mg bedtime  - for hypothyroidism : levothyroxine     GOC:   full code     Patient is a 67 y/o male w/ a PMHx of CAD s/p CABG (2020), AF on Eliquis c/b CVA (2019) c/b seizures, HTN, HLD, DM type II, hypothyroidism, GI bleed due to a duodenal ulcer (2/2022), and ESRD s/p DDRT (4/21/22) c/b DGF requiring HD (last session 4/29/22) & large perinephric hematoma s/p evacuation w/ revision of arterial anastomosis (5/2/22) who presented on 6/10 for status epilepticus. Course complicated by hemothorax, s/p VATS, now with recurrent refractory seizures likely secondary to tacrolimus toxicity  vs PRES syndrome.      Neuro: Seizures with concern of status vs PRES syndrome  -MS: waxes and wanes, opens eyes, follows commands, no focal deficit  - pain control w/ tylenol  - continue vEEG  - antiepileptics: valproate, Fycompa and now d/c vimpat, loaded with 1gm of fosphenytoin and on 100mg fosphenytoin   -will draw depakote and fosphenytoin level in the am  -ammonia level as well  - given 1mg Ativan on arrival, continue Ativan 0.5mg q6  - followed by neurology    Resp: s/p VATS evacuation of hemothorax  - chest tubes removed  - Out of bed to chair, ambulate as tolerated, and incentive spirometry to prevent atelectasis  - keep sats >92%  -CXR reviewed  -daily chest PT/suction prevent atelectasis      CVS: Hypertension  - continue Nifedipine, Hydralazine, Coreg  - atorvastatin 40mg  -lactate cleared      GI:   - carb consistent diet, soft with thick liquids  - Bowel regimen with senna & Miralax  - continue home protonix    Renal: ESRD s/p DDRT (4/21/22) c/b DGF requiring HD (last session 4/29/22) & large perinephric hematoma s/p evacuation w/ revision of arterial anastomosis (5/2/22)   - ANA elevated creatnin  -patient in retention, straithcath x1 600cc urine   - Monitor I&Os and electrolytes  - replete electrolytes as needed   - mycophenolate 1g BID, prednisone 5mg daily    Heme: RUE and RIJ DVT  - continue Eliquis 5 mg q12  - Monitor CBC and coags   - SCD's    ID: ppx  - Monitor for clinical evidence of active infection  - Monitor WBC, temperature, and procalcitonin  - AntibiotiCs: emp [id number] q24h, and fluconazole  - ppx ABX: bactrim and valganciclovir  - infectious workup pending    Endo: hx of DM, Hypothyroidism  - ISS qhs , lantus 4U and 4U premeal admelog  - for HLD : atorvastatin 40mg bedtime  - for hypothyroidism : levothyroxine     GOC:   full code

## 2022-06-26 NOTE — AIRWAY PLACEMENT NOTE ADULT - POST AIRWAY PLACEMENT ASSESSMENT:
breath sounds bilateral/breath sounds equal
breath sounds bilateral/breath sounds equal/positive end tidal CO2 noted/CXR pending/chest excursion noted
breath sounds bilateral/breath sounds equal/positive end tidal CO2 noted/CXR pending/chest excursion noted

## 2022-06-26 NOTE — PROGRESS NOTE ADULT - NS ATTEND AMEND GEN_ALL_CORE FT
I personally saw and examined patient over the past week,  Yesterday patient was transferred to SICU give diagnosis of neurology of repeated seizures.  Kidney transplant recipient with multiple medical findings.  Low white cell count.  Hypothermic over past 24-48 hours.  Today patient was somnolent, and was subsequently intubated in the afternoon to protect his airway.  Abdomen soft and non-tender.  I discussed case with Iliana Hunter (ICU) and Candace (Neurology) as well as with patient's sons (Suchet today by phone and his brother yesterday evening at patient's bedside).  Complex case.  Will start tube feeds and broad spectrum antimicrobial/antifungal coverage.  IMMUNOSUPPRESSION:  Cellcept discontinued today  Envarsus discontinued earlier this week.  On prednisone 5 mg daily.

## 2022-06-27 NOTE — PROGRESS NOTE ADULT - SUBJECTIVE AND OBJECTIVE BOX
HISTORY  66y Male    24 HOUR EVENTS:  Pt had worsening mental status throughout the day, no longer following commands. Pt was intubated for airway protection and started on propofol. Pt subsequently became salima and propofol was d/darren.   - Pt also became hypothermic with decreasing WBC to 3.06. Pt subsequently started on Meropenem for hx of Urine pos for ESBL, Vanc, and Fluconazole for empiric coverage   SUBJECTIVE/ROS:  [ ] A ten-point review of systems was otherwise negative except as noted.  [ ] Due to altered mental status/intubation, subjective information were not able to be obtained from the patient. History was obtained, to the extent possible, from review of the chart and collateral sources of information.      NEURO  RASS:  -3  Exam: awake, alert, oriented  Meds: fosphenytoin IVPB 100 milliGRAM(s) PE IV Intermittent every 8 hours  HYDROmorphone  Injectable 0.5 milliGRAM(s) IV Push every 3 hours PRN Moderate Pain (4 - 6)  perampanel 4 milliGRAM(s) Oral at bedtime  valproate sodium  IVPB 1000 milliGRAM(s) IV Intermittent every 8 hours    [x] Adequacy of sedation and pain control has been assessed and adjusted      RESPIRATORY  RR: 20 (06-27-22 @ 00:00) (13 - 20)  SpO2: 100% (06-27-22 @ 00:00) (97% - 100%)  Wt(kg): --  Exam: unlabored, clear to auscultation bilaterally  Mechanical Ventilation: Mode: AC/ CMV (Assist Control/ Continuous Mandatory Ventilation), RR (machine): 16, RR (patient): 16, TV (machine): 450, FiO2: 40, PEEP: 5, ITime: 0.9, MAP: 8, PIP: 25  ABG - ( 26 Jun 2022 17:34 )  pH: 7.42  /  pCO2: 40    /  pO2: 257   / HCO3: 26    / Base Excess: 1.3   /  SaO2: 98.7    Lactate: x        Meds:       CARDIOVASCULAR  HR: 55 (06-27-22 @ 00:00) (43 - 55)  BP: 154/68 (06-26-22 @ 19:00) (130/55 - 192/78)  BP(mean): 98 (06-26-22 @ 19:00) (85 - 112)  ABP: 172/57 (06-27-22 @ 00:00) (155/56 - 172/57)  ABP(mean): 98 (06-27-22 @ 00:00) (84 - 98)  Wt(kg): --  CVP(cm H2O): --      Exam: regular rate and rhythm  Cardiac Rhythm: sinus  Perfusion     [x]Adequate   [ ]Inadequate  Mentation   [x]Normal       [ ]Reduced  Extremities  [x]Warm         [ ]Cool  Volume Status [ ]Hypervolemic [x]Euvolemic [ ]Hypovolemic  Meds: amLODIPine   Tablet 10 milliGRAM(s) Oral daily  hydrALAZINE Injectable 10 milliGRAM(s) IV Push once        GI/NUTRITION  Exam: soft, nontender, nondistended, incision C/D/I  Diet:  Meds: pantoprazole  Injectable 40 milliGRAM(s) IV Push daily  polyethylene glycol 3350 17 Gram(s) Oral daily  senna Syrup 10 milliLiter(s) Oral daily      GENITOURINARY  I&O's Detail    06-25 @ 07:01  -  06-26 @ 07:00  --------------------------------------------------------  IN:    IV PiggyBack: 150 mL    IV PiggyBack: 200 mL    Lactated Ringers: 675 mL    Oral Fluid: 240 mL  Total IN: 1265 mL    OUT:    Indwelling Catheter - Urethral (mL): 700 mL    Intermittent Catheterization - Urethral (mL): 600 mL    Voided (mL): 300 mL  Total OUT: 1600 mL    Total NET: -335 mL      06-26 @ 07:01  -  06-27 @ 00:43  --------------------------------------------------------  IN:    Heparin: 83 mL    IV PiggyBack: 100 mL    IV PiggyBack: 600 mL    Jevity 1.5: 80 mL    Lactated Ringers: 1050 mL    Propofol: 20 mL  Total IN: 1933 mL    OUT:    FentaNYL: 0 mL    Indwelling Catheter - Urethral (mL): 1670 mL    Nasogastric/Oral tube (mL): 25 mL  Total OUT: 1695 mL    Total NET: 238 mL          06-26    143  |  107  |  42<H>  ----------------------------<  77  3.9   |  23  |  1.74<H>    Ca    8.3<L>      26 Jun 2022 18:14  Phos  3.8     06-26  Mg     2.0     06-26    TPro  5.3<L>  /  Alb  2.1<L>  /  TBili  0.2  /  DBili  x   /  AST  13  /  ALT  9<L>  /  AlkPhos  81  06-26    [ ] Miranda catheter, indication: N/A  Meds:       HEMATOLOGIC  Meds: heparin  Infusion 800 Unit(s)/Hr IV Continuous <Continuous>    [x] VTE Prophylaxis                        7.3    2.95  )-----------( 331      ( 26 Jun 2022 21:22 )             23.4     PT/INR - ( 26 Jun 2022 05:44 )   PT: 17.5 sec;   INR: 1.50 ratio         PTT - ( 26 Jun 2022 21:22 )  PTT:47.9 sec  Transfusion     [ ] PRBC   [ ] Platelets   [ ] FFP   [ ] Cryoprecipitate      INFECTIOUS DISEASES  WBC Count: 2.95 K/uL (06-26 @ 21:22)  WBC Count: 3.06 K/uL (06-26 @ 18:14)  WBC Count: 3.12 K/uL (06-26 @ 05:43)    RECENT CULTURES:    Meds: epoetin cortney-epbx (RETACRIT) Injectable 92105 Unit(s) SubCutaneous every 7 days  fluconAZOLE IVPB 200 milliGRAM(s) IV Intermittent every 24 hours  meropenem  IVPB 1000 milliGRAM(s) IV Intermittent every 12 hours  trimethoprim  40 mG/sulfamethoxazole 200 mG Suspension 80 milliGRAM(s) Oral <User Schedule>  valGANciclovir 50 mG/mL Oral Solution 450 milliGRAM(s) Oral <User Schedule>        ENDOCRINE  CAPILLARY BLOOD GLUCOSE      POCT Blood Glucose.: 84 mg/dL (26 Jun 2022 23:05)  POCT Blood Glucose.: 104 mg/dL (26 Jun 2022 20:16)  POCT Blood Glucose.: 68 mg/dL (26 Jun 2022 19:42)  POCT Blood Glucose.: 106 mg/dL (26 Jun 2022 15:08)  POCT Blood Glucose.: 109 mg/dL (26 Jun 2022 11:13)  POCT Blood Glucose.: 132 mg/dL (26 Jun 2022 07:19)    Meds: atorvastatin 40 milliGRAM(s) Oral at bedtime  insulin lispro (ADMELOG) corrective regimen sliding scale   SubCutaneous every 6 hours  levothyroxine Injectable 40 MICROGram(s) IV Push at bedtime  predniSONE  Solution 5 milliGRAM(s) Oral daily        ACCESS DEVICES:  [ ] Peripheral IV  [ ] Central Venous Line	[ ] R	[ ] L	[ ] IJ	[ ] Fem	[ ] SC	Placed:   [ ] Arterial Line		[ ] R	[ ] L	[ ] Fem	[ ] Rad	[ ] Ax	Placed:   [ ] PICC:					[ ] Mediport  [ ] Urinary Catheter, Date Placed:   [x] Necessity of urinary, arterial, and venous catheters discussed    OTHER MEDICATIONS:  chlorhexidine 0.12% Liquid 15 milliLiter(s) Oral Mucosa every 12 hours  chlorhexidine 2% Cloths 1 Application(s) Topical <User Schedule>      CODE STATUS: full code      IMAGING: HISTORY  66y Male    24 HOUR EVENTS:  Pt had worsening mental status throughout the day, no longer following commands. Pt was intubated for airway protection and started on propofol. Pt subsequently became salima and propofol was d/darren.   - Pt also became hypothermic with decreasing WBC to 3.06. Pt subsequently started on Meropenem for hx of Urine pos for ESBL, Vanc, and Fluconazole for empiric coverage   SUBJECTIVE/ROS:  [ ] A ten-point review of systems was otherwise negative except as noted.  [ ] Due to altered mental status/intubation, subjective information were not able to be obtained from the patient. History was obtained, to the extent possible, from review of the chart and collateral sources of information.      NEURO  RASS:  -3  Exam: awakes to voice. does not follow commands   Meds: fosphenytoin IVPB 100 milliGRAM(s) PE IV Intermittent every 8 hours  HYDROmorphone  Injectable 0.5 milliGRAM(s) IV Push every 3 hours PRN Moderate Pain (4 - 6)  perampanel 4 milliGRAM(s) Oral at bedtime  valproate sodium  IVPB 1000 milliGRAM(s) IV Intermittent every 8 hours    [x] Adequacy of sedation and pain control has been assessed and adjusted      RESPIRATORY  RR: 20 (06-27-22 @ 00:00) (13 - 20)  SpO2: 100% (06-27-22 @ 00:00) (97% - 100%)  Wt(kg): --  Exam: unlabored, clear to auscultation bilaterally  Mechanical Ventilation: Mode: AC/ CMV (Assist Control/ Continuous Mandatory Ventilation), RR (machine): 16, RR (patient): 16, TV (machine): 450, FiO2: 40, PEEP: 5, ITime: 0.9, MAP: 8, PIP: 25  ABG - ( 26 Jun 2022 17:34 )  pH: 7.42  /  pCO2: 40    /  pO2: 257   / HCO3: 26    / Base Excess: 1.3   /  SaO2: 98.7    Lactate: x        Meds:       CARDIOVASCULAR  HR: 55 (06-27-22 @ 00:00) (43 - 55)  BP: 154/68 (06-26-22 @ 19:00) (130/55 - 192/78)  BP(mean): 98 (06-26-22 @ 19:00) (85 - 112)  ABP: 172/57 (06-27-22 @ 00:00) (155/56 - 172/57)  ABP(mean): 98 (06-27-22 @ 00:00) (84 - 98)  Wt(kg): --  CVP(cm H2O): --      Exam: regular rate and rhythm  Cardiac Rhythm: sinus salima  Perfusion     [x]Adequate   [ ]Inadequate  Mentation   [x]Normal       [ ]Reduced  Extremities  [x]Warm         [ ]Cool  Volume Status [ ]Hypervolemic [x]Euvolemic [ ]Hypovolemic  Meds: amLODIPine   Tablet 10 milliGRAM(s) Oral daily  hydrALAZINE Injectable 10 milliGRAM(s) IV Push once        GI/NUTRITION  Exam: soft, nontender, nondistended, incision C/D/I  Diet: jevity 1.5 at 20cc/hr  Meds: pantoprazole  Injectable 40 milliGRAM(s) IV Push daily  polyethylene glycol 3350 17 Gram(s) Oral daily  senna Syrup 10 milliLiter(s) Oral daily      GENITOURINARY  I&O's Detail    06-25 @ 07:01  -  06-26 @ 07:00  --------------------------------------------------------  IN:    IV PiggyBack: 150 mL    IV PiggyBack: 200 mL    Lactated Ringers: 675 mL    Oral Fluid: 240 mL  Total IN: 1265 mL    OUT:    Indwelling Catheter - Urethral (mL): 700 mL    Intermittent Catheterization - Urethral (mL): 600 mL    Voided (mL): 300 mL  Total OUT: 1600 mL    Total NET: -335 mL      06-26 @ 07:01  -  06-27 @ 00:43  --------------------------------------------------------  IN:    Heparin: 83 mL    IV PiggyBack: 100 mL    IV PiggyBack: 600 mL    Jevity 1.5: 80 mL    Lactated Ringers: 1050 mL    Propofol: 20 mL  Total IN: 1933 mL    OUT:    FentaNYL: 0 mL    Indwelling Catheter - Urethral (mL): 1670 mL    Nasogastric/Oral tube (mL): 25 mL  Total OUT: 1695 mL    Total NET: 238 mL          06-26    143  |  107  |  42<H>  ----------------------------<  77  3.9   |  23  |  1.74<H>    Ca    8.3<L>      26 Jun 2022 18:14  Phos  3.8     06-26  Mg     2.0     06-26    TPro  5.3<L>  /  Alb  2.1<L>  /  TBili  0.2  /  DBili  x   /  AST  13  /  ALT  9<L>  /  AlkPhos  81  06-26    [ ] Miranda catheter, indication: N/A  Meds:       HEMATOLOGIC  Meds: heparin  Infusion 800 Unit(s)/Hr IV Continuous <Continuous>    [x] VTE Prophylaxis                        7.3    2.95  )-----------( 331      ( 26 Jun 2022 21:22 )             23.4     PT/INR - ( 26 Jun 2022 05:44 )   PT: 17.5 sec;   INR: 1.50 ratio         PTT - ( 26 Jun 2022 21:22 )  PTT:47.9 sec  Transfusion     [ ] PRBC   [ ] Platelets   [ ] FFP   [ ] Cryoprecipitate      INFECTIOUS DISEASES  WBC Count: 2.95 K/uL (06-26 @ 21:22)  WBC Count: 3.06 K/uL (06-26 @ 18:14)  WBC Count: 3.12 K/uL (06-26 @ 05:43)    RECENT CULTURES:    Meds: epoetin cortney-epbx (RETACRIT) Injectable 94867 Unit(s) SubCutaneous every 7 days  fluconAZOLE IVPB 200 milliGRAM(s) IV Intermittent every 24 hours  meropenem  IVPB 1000 milliGRAM(s) IV Intermittent every 12 hours  trimethoprim  40 mG/sulfamethoxazole 200 mG Suspension 80 milliGRAM(s) Oral <User Schedule>  valGANciclovir 50 mG/mL Oral Solution 450 milliGRAM(s) Oral <User Schedule>        ENDOCRINE  CAPILLARY BLOOD GLUCOSE      POCT Blood Glucose.: 84 mg/dL (26 Jun 2022 23:05)  POCT Blood Glucose.: 104 mg/dL (26 Jun 2022 20:16)  POCT Blood Glucose.: 68 mg/dL (26 Jun 2022 19:42)  POCT Blood Glucose.: 106 mg/dL (26 Jun 2022 15:08)  POCT Blood Glucose.: 109 mg/dL (26 Jun 2022 11:13)  POCT Blood Glucose.: 132 mg/dL (26 Jun 2022 07:19)    Meds: atorvastatin 40 milliGRAM(s) Oral at bedtime  insulin lispro (ADMELOG) corrective regimen sliding scale   SubCutaneous every 6 hours  levothyroxine Injectable 40 MICROGram(s) IV Push at bedtime  predniSONE  Solution 5 milliGRAM(s) Oral daily        ACCESS DEVICES:  [ ] Peripheral IV  [ ] Central Venous Line	[ ] R	[ ] L	[ ] IJ	[ ] Fem	[ ] SC	Placed:   [ ] Arterial Line		[ ] R	[ ] L	[ ] Fem	[ ] Rad	[ ] Ax	Placed:   [ ] PICC:					[ ] Mediport  [ ] Urinary Catheter, Date Placed:   [x] Necessity of urinary, arterial, and venous catheters discussed    OTHER MEDICATIONS:  chlorhexidine 0.12% Liquid 15 milliLiter(s) Oral Mucosa every 12 hours  chlorhexidine 2% Cloths 1 Application(s) Topical <User Schedule>      CODE STATUS: full code      IMAGING:  COMPARISON: Prior head CT study from 6/21/2022. Prior brain MRI study   from 6/20/2022.    FINDINGS: Extensive encephalomalacia and gliosis are again noted within   the high bilateral cerebral hemispheres.. Overall appearance is unchanged.    There is no acute intracranial hemorrhage, mass effect, shift of the   midline structures, herniation, hydrocephalus, abnormal extra-axial fluid   collection.    There is diffuse cerebral volume loss with prominence of the sulci,   fissures, and cisternal spaces which is normal for the patient's age.   There is moderate patchy confluent periventricular white matter   hypoattenuation statistically compatible with microvascular changes given   calcific atherosclerotic disease of the intracranial arteries.    The paranasal sinuses and mastoid air cells are clear. The calvarium   appears intact. There is evidence of bilateral cataract removal.    IMPRESSION:No acute intracranial hemorrhage.    Similar-appearing extensive encephalomalacia and gliosis within the   bilateral high cerebral hemispheres.    Similar-appearing moderate severity chronic white matter microvascular   type changes.     24 HOUR EVENTS:  Pt had worsening mental status throughout the day, no longer following commands. Pt was intubated for airway protection and started on propofol. Pt subsequently became salima and propofol was d/darren.   - Pt also became hypothermic with decreasing WBC to 3.06. Pt subsequently started on Meropenem for hx of Urine pos for ESBL, Vanc, and Fluconazole for empiric coverage   SUBJECTIVE/ROS:  [ ] A ten-point review of systems was otherwise negative except as noted.  [ ] Due to altered mental status/intubation, subjective information were not able to be obtained from the patient. History was obtained, to the extent possible, from review of the chart and collateral sources of information.      NEURO  RASS:  -3  Exam: awakes to voice. does not follow commands   Meds: fosphenytoin IVPB 100 milliGRAM(s) PE IV Intermittent every 8 hours  HYDROmorphone  Injectable 0.5 milliGRAM(s) IV Push every 3 hours PRN Moderate Pain (4 - 6)  perampanel 4 milliGRAM(s) Oral at bedtime  valproate sodium  IVPB 1000 milliGRAM(s) IV Intermittent every 8 hours    [x] Adequacy of sedation and pain control has been assessed and adjusted      RESPIRATORY  RR: 20 (06-27-22 @ 00:00) (13 - 20)  SpO2: 100% (06-27-22 @ 00:00) (97% - 100%)  Wt(kg): --  Exam: unlabored, clear to auscultation bilaterally  Mechanical Ventilation: Mode: AC/ CMV (Assist Control/ Continuous Mandatory Ventilation), RR (machine): 16, RR (patient): 16, TV (machine): 450, FiO2: 40, PEEP: 5, ITime: 0.9, MAP: 8, PIP: 25  ABG - ( 26 Jun 2022 17:34 )  pH: 7.42  /  pCO2: 40    /  pO2: 257   / HCO3: 26    / Base Excess: 1.3   /  SaO2: 98.7    Lactate: x        Meds:       CARDIOVASCULAR  HR: 55 (06-27-22 @ 00:00) (43 - 55)  BP: 154/68 (06-26-22 @ 19:00) (130/55 - 192/78)  BP(mean): 98 (06-26-22 @ 19:00) (85 - 112)  ABP: 172/57 (06-27-22 @ 00:00) (155/56 - 172/57)  ABP(mean): 98 (06-27-22 @ 00:00) (84 - 98)  Wt(kg): --  CVP(cm H2O): --      Exam: regular rate and rhythm  Cardiac Rhythm: sinus salima  Perfusion     [x]Adequate   [ ]Inadequate  Mentation   [x]Normal       [ ]Reduced  Extremities  [x]Warm         [ ]Cool  Volume Status [ ]Hypervolemic [x]Euvolemic [ ]Hypovolemic  Meds: amLODIPine   Tablet 10 milliGRAM(s) Oral daily  hydrALAZINE Injectable 10 milliGRAM(s) IV Push once        GI/NUTRITION  Exam: soft, nontender, nondistended, incision C/D/I  Diet: jevity 1.5 at 20cc/hr  Meds: pantoprazole  Injectable 40 milliGRAM(s) IV Push daily  polyethylene glycol 3350 17 Gram(s) Oral daily  senna Syrup 10 milliLiter(s) Oral daily      GENITOURINARY  I&O's Detail    06-25 @ 07:01 - 06-26 @ 07:00  --------------------------------------------------------  IN:    IV PiggyBack: 150 mL    IV PiggyBack: 200 mL    Lactated Ringers: 675 mL    Oral Fluid: 240 mL  Total IN: 1265 mL    OUT:    Indwelling Catheter - Urethral (mL): 700 mL    Intermittent Catheterization - Urethral (mL): 600 mL    Voided (mL): 300 mL  Total OUT: 1600 mL    Total NET: -335 mL      06-26 @ 07:01 - 06-27 @ 00:43  --------------------------------------------------------  IN:    Heparin: 83 mL    IV PiggyBack: 100 mL    IV PiggyBack: 600 mL    Jevity 1.5: 80 mL    Lactated Ringers: 1050 mL    Propofol: 20 mL  Total IN: 1933 mL    OUT:    FentaNYL: 0 mL    Indwelling Catheter - Urethral (mL): 1670 mL    Nasogastric/Oral tube (mL): 25 mL  Total OUT: 1695 mL    Total NET: 238 mL          06-26    143  |  107  |  42<H>  ----------------------------<  77  3.9   |  23  |  1.74<H>    Ca    8.3<L>      26 Jun 2022 18:14  Phos  3.8     06-26  Mg     2.0     06-26    TPro  5.3<L>  /  Alb  2.1<L>  /  TBili  0.2  /  DBili  x   /  AST  13  /  ALT  9<L>  /  AlkPhos  81  06-26    [ ] Miranda catheter, indication: N/A  Meds:       HEMATOLOGIC  Meds: heparin  Infusion 800 Unit(s)/Hr IV Continuous <Continuous>    [x] VTE Prophylaxis                        7.3    2.95  )-----------( 331      ( 26 Jun 2022 21:22 )             23.4     PT/INR - ( 26 Jun 2022 05:44 )   PT: 17.5 sec;   INR: 1.50 ratio         PTT - ( 26 Jun 2022 21:22 )  PTT:47.9 sec  Transfusion     [ ] PRBC   [ ] Platelets   [ ] FFP   [ ] Cryoprecipitate      INFECTIOUS DISEASES  WBC Count: 2.95 K/uL (06-26 @ 21:22)  WBC Count: 3.06 K/uL (06-26 @ 18:14)  WBC Count: 3.12 K/uL (06-26 @ 05:43)    RECENT CULTURES:    Meds: epoetin cortney-epbx (RETACRIT) Injectable 25306 Unit(s) SubCutaneous every 7 days  fluconAZOLE IVPB 200 milliGRAM(s) IV Intermittent every 24 hours  meropenem  IVPB 1000 milliGRAM(s) IV Intermittent every 12 hours  trimethoprim  40 mG/sulfamethoxazole 200 mG Suspension 80 milliGRAM(s) Oral <User Schedule>  valGANciclovir 50 mG/mL Oral Solution 450 milliGRAM(s) Oral <User Schedule>        ENDOCRINE  CAPILLARY BLOOD GLUCOSE      POCT Blood Glucose.: 84 mg/dL (26 Jun 2022 23:05)  POCT Blood Glucose.: 104 mg/dL (26 Jun 2022 20:16)  POCT Blood Glucose.: 68 mg/dL (26 Jun 2022 19:42)  POCT Blood Glucose.: 106 mg/dL (26 Jun 2022 15:08)  POCT Blood Glucose.: 109 mg/dL (26 Jun 2022 11:13)  POCT Blood Glucose.: 132 mg/dL (26 Jun 2022 07:19)    Meds: atorvastatin 40 milliGRAM(s) Oral at bedtime  insulin lispro (ADMELOG) corrective regimen sliding scale   SubCutaneous every 6 hours  levothyroxine Injectable 40 MICROGram(s) IV Push at bedtime  predniSONE  Solution 5 milliGRAM(s) Oral daily        ACCESS DEVICES:  [ ] Peripheral IV  [ ] Central Venous Line	[ ] R	[ ] L	[ ] IJ	[ ] Fem	[ ] SC	Placed:   [ ] Arterial Line		[ ] R	[ ] L	[ ] Fem	[ ] Rad	[ ] Ax	Placed:   [ ] PICC:					[ ] Mediport  [ ] Urinary Catheter, Date Placed:   [x] Necessity of urinary, arterial, and venous catheters discussed    OTHER MEDICATIONS:  chlorhexidine 0.12% Liquid 15 milliLiter(s) Oral Mucosa every 12 hours  chlorhexidine 2% Cloths 1 Application(s) Topical <User Schedule>      CODE STATUS: full code      IMAGING:  COMPARISON: Prior head CT study from 6/21/2022. Prior brain MRI study   from 6/20/2022.    FINDINGS: Extensive encephalomalacia and gliosis are again noted within   the high bilateral cerebral hemispheres.. Overall appearance is unchanged.    There is no acute intracranial hemorrhage, mass effect, shift of the   midline structures, herniation, hydrocephalus, abnormal extra-axial fluid   collection.    There is diffuse cerebral volume loss with prominence of the sulci,   fissures, and cisternal spaces which is normal for the patient's age.   There is moderate patchy confluent periventricular white matter   hypoattenuation statistically compatible with microvascular changes given   calcific atherosclerotic disease of the intracranial arteries.    The paranasal sinuses and mastoid air cells are clear. The calvarium   appears intact. There is evidence of bilateral cataract removal.    IMPRESSION:No acute intracranial hemorrhage.    Similar-appearing extensive encephalomalacia and gliosis within the   bilateral high cerebral hemispheres.    Similar-appearing moderate severity chronic white matter microvascular   type changes.

## 2022-06-27 NOTE — PROGRESS NOTE ADULT - SUBJECTIVE AND OBJECTIVE BOX
Follow Up:      Interval History:    REVIEW OF SYSTEMS  [  ] ROS unobtainable because:    [  ] All other systems negative except as noted below    Constitutional:  [ ] fever [ ] chills  [ ] weight loss  [ ] weakness  Skin:  [ ] rash [ ] phlebitis	  Eyes: [ ] icterus [ ] pain  [ ] discharge	  ENMT: [ ] sore throat  [ ] thrush [ ] ulcers [ ] exudates  Respiratory: [ ] dyspnea [ ] hemoptysis [ ] cough [ ] sputum	  Cardiovascular:  [ ] chest pain [ ] palpitations [ ] edema	  Gastrointestinal:  [ ] nausea [ ] vomiting [ ] diarrhea [ ] constipation [ ] pain	  Genitourinary:  [ ] dysuria [ ] frequency [ ] hematuria [ ] discharge [ ] flank pain  [ ] incontinence  Musculoskeletal:  [ ] myalgias [ ] arthralgias [ ] arthritis  [ ] back pain  Neurological:  [ ] headache [ ] seizures  [ ] confusion/altered mental status    Allergies  No Known Allergies        ANTIMICROBIALS:  fluconAZOLE IVPB 200 every 24 hours  meropenem  IVPB 1000 every 12 hours  trimethoprim  40 mG/sulfamethoxazole 200 mG Suspension 80 <User Schedule>      OTHER MEDS:  MEDICATIONS  (STANDING):  amLODIPine   Tablet 10 daily  atorvastatin 40 at bedtime  epoetin cortney-epbx (RETACRIT) Injectable 21135 every 7 days  fosphenytoin IVPB 100 every 8 hours  heparin  Infusion 800 <Continuous>  HYDROmorphone  Injectable 0.5 every 3 hours PRN  insulin lispro (ADMELOG) corrective regimen sliding scale  every 6 hours  levothyroxine Injectable 40 at bedtime  pantoprazole  Injectable 40 daily  perampanel 4 at bedtime  polyethylene glycol 3350 17 daily  predniSONE  Solution 5 daily  senna Syrup 10 daily  valproate sodium  IVPB 500 every 8 hours      Vital Signs Last 24 Hrs  T(C): 2.3 (2022 16:00), Max: 37.2 (2022 23:00)  T(F): 36.3 (2022 16:00), Max: 99 (2022 23:00)  HR: 55 (2022 16:59) (45 - 62)  BP: 154/68 (2022 19:00) (154/68 - 154/68)  BP(mean): 98 (2022 19:00) (98 - 98)  RR: 16 (2022 16:00) (15 - 26)  SpO2: 100% (2022 16:59) (100% - 100%)    PHYSICAL EXAMINATION:  General: Alert and Awake, NAD  HEENT: PERRL, EOMI  Neck: Supple  Cardiac: RRR, No M/R/G  Resp: CTAB, No Wh/Rh/Ra  Abdomen: NBS, NT/ND, No HSM, No rigidity or guarding  MSK: No LE edema. No Calf tenderness  : No tucker  Skin: No rashes or lesions. Skin is warm and dry to the touch.   Neuro: Alert and Awake. CN 2-12 Grossly intact. Moves all four extremities spontaneously.  Psych: Calm, Pleasant, Cooperative                          8.7    3.89  )-----------( 425      ( 2022 04:13 )             27.8           141  |  108  |  36<H>  ----------------------------<  82  3.7   |  24  |  1.64<H>    Ca    8.3<L>      2022 02:00  Phos  3.6       Mg     1.9         TPro  5.4<L>  /  Alb  2.2<L>  /  TBili  0.2  /  DBili  x   /  AST  13  /  ALT  6<L>  /  AlkPhos  82        Urinalysis Basic - ( 2022 06:25 )    Color: Light Yellow / Appearance: Clear / S.015 / pH: x  Gluc: x / Ketone: Negative  / Bili: Negative / Urobili: Negative   Blood: x / Protein: Trace / Nitrite: Negative   Leuk Esterase: Negative / RBC: 3 /hpf / WBC 1 /HPF   Sq Epi: x / Non Sq Epi: 1 /hpf / Bacteria: Negative        MICROBIOLOGY:  v  Combi-Cath Combi-Cath  22 --  --    Few polymorphonuclear leukocytes per low power field  Rare Squamous Epithelial Cells per low power field  Rare Gram Positive Rods per oil power field      .Blood Blood-Peripheral  22   No growth to date.  --  --      .Blood Blood-Peripheral  22   No growth to date.  --  --      Catheterized Catheterized  22   No growth  --  --      .Blood Blood-Peripheral  22   No Growth Final  --  --      .Blood Blood  22   No Growth Final  --  --      .Body Fluid Pleural Fluid  22   No growth at 5 days  --    Few polymorphonuclear leukocytes seen  No organisms seen      .Blood Blood  06-10-22   No Growth Final  --  --      .Blood Blood  06-10-22   No Growth Final  --  --          CMVPCR Log: NotDetec Eej85RP/mL ( @ 18:14)  CMVPCR Log: NotDetec Nfo76MV/mL ( @ 14:08)        RADIOLOGY:    <The imaging below has been reviewed and visualized by me independently. Findings as detailed in report below> Follow Up:  Hypothermia    Interval History: intubated yesterday evening. planned for MRI today. continued hypothermia    REVIEW OF SYSTEMS  [ x ] ROS unobtainable because:  intubated  [  ] All other systems negative except as noted below    Constitutional:  [ ] fever [ ] chills  [ ] weight loss  [ ] weakness  Skin:  [ ] rash [ ] phlebitis	  Eyes: [ ] icterus [ ] pain  [ ] discharge	  ENMT: [ ] sore throat  [ ] thrush [ ] ulcers [ ] exudates  Respiratory: [ ] dyspnea [ ] hemoptysis [ ] cough [ ] sputum	  Cardiovascular:  [ ] chest pain [ ] palpitations [ ] edema	  Gastrointestinal:  [ ] nausea [ ] vomiting [ ] diarrhea [ ] constipation [ ] pain	  Genitourinary:  [ ] dysuria [ ] frequency [ ] hematuria [ ] discharge [ ] flank pain  [ ] incontinence  Musculoskeletal:  [ ] myalgias [ ] arthralgias [ ] arthritis  [ ] back pain  Neurological:  [ ] headache [ ] seizures  [ ] confusion/altered mental status    Allergies  No Known Allergies        ANTIMICROBIALS:  fluconAZOLE IVPB 200 every 24 hours  meropenem  IVPB 1000 every 12 hours  trimethoprim  40 mG/sulfamethoxazole 200 mG Suspension 80 <User Schedule>      OTHER MEDS:  MEDICATIONS  (STANDING):  amLODIPine   Tablet 10 daily  atorvastatin 40 at bedtime  epoetin cortney-epbx (RETACRIT) Injectable 61889 every 7 days  fosphenytoin IVPB 100 every 8 hours  heparin  Infusion 800 <Continuous>  HYDROmorphone  Injectable 0.5 every 3 hours PRN  insulin lispro (ADMELOG) corrective regimen sliding scale  every 6 hours  levothyroxine Injectable 40 at bedtime  pantoprazole  Injectable 40 daily  perampanel 4 at bedtime  polyethylene glycol 3350 17 daily  predniSONE  Solution 5 daily  senna Syrup 10 daily  valproate sodium  IVPB 500 every 8 hours      Vital Signs Last 24 Hrs  T(C): 2.3 (2022 16:00), Max: 37.2 (2022 23:00)  T(F): 36.3 (2022 16:00), Max: 99 (2022 23:00)  HR: 55 (2022 16:59) (45 - 62)  BP: 154/68 (2022 19:00) (154/68 - 154/68)  BP(mean): 98 (2022 19:00) (98 - 98)  RR: 16 (2022 16:00) (15 - 26)  SpO2: 100% (2022 16:59) (100% - 100%)    PHYSICAL EXAMINATION:  General: Intubated and Sedated  HEENT: +ETT  Neck: Supple  Cardiac: RRR, No M/R/G  Resp: CTAB, No Wh/Rh/Ra  Abdomen: NBS, NT/ND, No HSM, No rigidity or guarding  MSK: No LE edema. No Calf tenderness  : tucker  Skin: No rashes or lesions. Skin is warm and dry to the touch.   Neuro: Intubated and Sedated  Psych: Unable to assess - intubated and sedated                          8.7    3.89  )-----------( 425      ( 2022 04:13 )             27.8           141  |  108  |  36<H>  ----------------------------<  82  3.7   |  24  |  1.64<H>    Ca    8.3<L>      2022 02:00  Phos  3.6       Mg     1.9         TPro  5.4<L>  /  Alb  2.2<L>  /  TBili  0.2  /  DBili  x   /  AST  13  /  ALT  6<L>  /  AlkPhos  82        Urinalysis Basic - ( 2022 06:25 )    Color: Light Yellow / Appearance: Clear / S.015 / pH: x  Gluc: x / Ketone: Negative  / Bili: Negative / Urobili: Negative   Blood: x / Protein: Trace / Nitrite: Negative   Leuk Esterase: Negative / RBC: 3 /hpf / WBC 1 /HPF   Sq Epi: x / Non Sq Epi: 1 /hpf / Bacteria: Negative        MICROBIOLOGY:  v  Combi-Cath Combi-Cath  22 --  --    Few polymorphonuclear leukocytes per low power field  Rare Squamous Epithelial Cells per low power field  Rare Gram Positive Rods per oil power field      .Blood Blood-Peripheral  22   No growth to date.  --  --      .Blood Blood-Peripheral  22   No growth to date.  --  --      Catheterized Catheterized  22   No growth  --  --      .Blood Blood-Peripheral  22   No Growth Final  --  --      .Blood Blood  22   No Growth Final  --  --      .Body Fluid Pleural Fluid  22   No growth at 5 days  --    Few polymorphonuclear leukocytes seen  No organisms seen      .Blood Blood  06-10-22   No Growth Final  --  --      .Blood Blood  06-10-22   No Growth Final  --  --          CMVPCR Log: NotDetec Rua67BW/mL ( @ 18:14)  CMVPCR Log: NotDetec Nxb80JA/mL ( @ 14:08)        RADIOLOGY:    <The imaging below has been reviewed and visualized by me independently. Findings as detailed in report below>    < from: Xray Chest 1 View- PORTABLE-Urgent (Xray Chest 1 View- PORTABLE-Urgent .) (22 @ 09:48) >  IMPRESSION:  Endotracheal tube terminates above the chuck.    < end of copied text >

## 2022-06-27 NOTE — PROGRESS NOTE ADULT - NUTRITIONAL ASSESSMENT
This patient has been assessed with a concern for Malnutrition and has been determined to have a diagnosis/diagnoses of Severe protein-calorie malnutrition.    This patient is being managed with:   Diet NPO with Tube Feed-  Tube Feeding Modality: Orogastric  Glucerna 1.5 Tyson (GLUCERNA1.5RTH)  Total Volume for 24 Hours (mL): 1200  Continuous  Starting Tube Feed Rate {mL per Hour}: 50  Until Goal Tube Feed Rate (mL per Hour): 50  Tube Feed Duration (in Hours): 24  Tube Feed Start Time: 17:00  Entered: Jun 27 2022  3:20PM

## 2022-06-27 NOTE — PROGRESS NOTE ADULT - CRITICAL CARE ATTENDING COMMENT
Dr. Martin (Attending Physician)  N - Seizure disorder with Status Epilepticus on continuous EEG on valproic acid, fosphenytoin, Fycoma, will get MRI and lumbar puncture tomorrow after 48 hours of clearance from last dose of AC  P - acute resp failure 2/2 depressed mental status  C - sinus bradycardia  GI - on TFs, ppi for ppx   - renal tx in 4/22, Cr 1.64, good uo  H - RUE, RIJ DVT, on hep gtt, Eliquis yesterday morning, h/h stable  ID - leukopenic, hypothermic, concern for encephalitis on vanc/meropenem was on ppx valcyclovir, fluconazole and bactrim, off tacro, will dw transplant ID regarding starting therapeutic valcyclovir  E - steroids for transplant, ssi, hypoglycemic to 60 last night given 1 amp, started TFs overnight with improvement  PPx - ppi, hep gtt  Lines - a-line, tucker, ogt, no central lines  Dispo - SICU

## 2022-06-27 NOTE — PROGRESS NOTE ADULT - SUBJECTIVE AND OBJECTIVE BOX
[facility]   MD NINA MASON MD, DO SADIE RAMIREZ NP    TEL:  OFFICE: [phone]    From 5pm-7am Answering Service [phone]    -- RENAL FOLLOW UP NOTE ---Date of Service 06-27-22 @ 14:43    Patient is a 66y old  Male who presents with a chief complaint of Status Epilepticus (27 Jun 2022 12:35)      Patient seen and examined in ICU.   VITALS:  T(F): 98.4 (06-27-22 @ 11:00), Max: 99 (06-26-22 @ 23:00)  HR: 57 (06-27-22 @ 13:00)  BP: 154/68 (06-26-22 @ 19:00)  RR: 15 (06-27-22 @ 13:00)  SpO2: 100% (06-27-22 @ 13:00)  Wt(kg): --    06-26 @ 07:01  -  06-27 @ 07:00  --------------------------------------------------------  IN: 2337.5 mL / OUT: 2290 mL / NET: 47.5 mL    06-27 @ 07:01  -  06-27 @ 14:43  --------------------------------------------------------  IN: 470 mL / OUT: 300 mL / NET: 170 mL          PHYSICAL EXAM:  Constitutional: NAD  Neck: No JVD  Respiratory: CTAB, no wheezes, rales or rhonchi  Cardiovascular: S1, S2, RRR  Gastrointestinal: BS+, soft, NT/ND  Extremities: No peripheral edema    Hospital Medications:   MEDICATIONS  (STANDING):  amLODIPine   Tablet 10 milliGRAM(s) Oral daily  atorvastatin 40 milliGRAM(s) Oral at bedtime  chlorhexidine 0.12% Liquid 15 milliLiter(s) Oral Mucosa every 12 hours  chlorhexidine 2% Cloths 1 Application(s) Topical <User Schedule>  epoetin cortney-epbx (RETACRIT) Injectable 73252 Unit(s) SubCutaneous every 7 days  fentaNYL    Injectable 50 MICROGram(s) IV Push once  fluconAZOLE IVPB 200 milliGRAM(s) IV Intermittent every 24 hours  fosphenytoin IVPB 100 milliGRAM(s) PE IV Intermittent every 8 hours  ganciclovir IVPB 155 milliGRAM(s) IV Intermittent once  heparin  Infusion 800 Unit(s)/Hr (10.5 mL/Hr) IV Continuous <Continuous>  insulin lispro (ADMELOG) corrective regimen sliding scale   SubCutaneous every 6 hours  levothyroxine Injectable 40 MICROGram(s) IV Push at bedtime  meropenem  IVPB 1000 milliGRAM(s) IV Intermittent every 12 hours  pantoprazole  Injectable 40 milliGRAM(s) IV Push daily  perampanel 4 milliGRAM(s) Oral at bedtime  polyethylene glycol 3350 17 Gram(s) Oral daily  predniSONE  Solution 5 milliGRAM(s) Oral daily  senna Syrup 10 milliLiter(s) Oral daily  trimethoprim  40 mG/sulfamethoxazole 200 mG Suspension 80 milliGRAM(s) Oral <User Schedule>  valproate sodium  IVPB 500 milliGRAM(s) IV Intermittent every 8 hours      LABS:  06-27    141  |  108  |  36<H>  ----------------------------<  82  3.7   |  24  |  1.64<H>    Ca    8.3<L>      27 Jun 2022 02:00  Phos  3.6     06-27  Mg     1.9     06-27    TPro  5.4<L>  /  Alb  2.2<L>  /  TBili  0.2  /  DBili      /  AST  13  /  ALT  6<L>  /  AlkPhos  82  06-27    Creatinine Trend: 1.64 <--, 1.74 <--, 1.89 <--, 2.11 <--, 1.96 <--, 1.73 <--, 1.75 <--, 1.46 <--, 1.53 <--    Albumin, Serum: 2.2 g/dL (06-27 @ 02:00)  Phosphorus Level, Serum: 3.6 mg/dL (06-27 @ 02:00)  Phosphorus Level, Serum: 3.8 mg/dL (06-26 @ 18:14)  Albumin, Serum: 2.1 g/dL (06-26 @ 18:14)                              8.7    3.89  )-----------( 425      ( 27 Jun 2022 04:13 )             27.8     Urine Studies:  Urinalysis - [06-26-22 @ 06:25]      Color Light Yellow / Appearance Clear / SG 1.015 / pH 7.0      Gluc Negative / Ketone Negative  / Bili Negative / Urobili Negative       Blood Negative / Protein Trace / Leuk Est Negative / Nitrite Negative      RBC 3 / WBC 1 / Hyaline 0 / Gran  / Sq Epi  / Non Sq Epi 1 / Bacteria Negative    Urine Creatinine 52      [06-25-22 @ 14:10]  Urine Protein 24      [06-25-22 @ 14:10]  Urine Sodium 68      [06-25-22 @ 14:10]  Urine Osmolality 375      [06-25-22 @ 14:10]    Iron 17, TIBC 171, %sat 10      [05-02-22 @ 13:03]  Ferritin 1505      [05-02-22 @ 13:05]  PTH -- (Ca 9.2)      [02-05-22 @ 10:13]   294  HbA1c 6.0      [02-21-20 @ 08:53]      Syphilis Screen (Treponema Pallidum Ab) Negative      [06-27-22 @ 02:00]    RADIOLOGY & ADDITIONAL STUDIES:

## 2022-06-27 NOTE — CHART NOTE - NSCHARTNOTEFT_GEN_A_CORE
Diabetes Follow up note: non-billable visit    Chief complaint: T2DM    Interval Hx: Attempted to see pt this afternoon. Off unit. BG values in 100s today on continuous tube feeds.     MEDS:  atorvastatin 40 milliGRAM(s) Oral at bedtime  insulin lispro (ADMELOG) corrective regimen sliding scale   SubCutaneous every 6 hours  levothyroxine Injectable 40 MICROGram(s) IV Push at bedtime  predniSONE  Solution 5 milliGRAM(s) Oral daily    fluconAZOLE IVPB 200 milliGRAM(s) IV Intermittent every 24 hours  ganciclovir IVPB 155 milliGRAM(s) IV Intermittent once  meropenem  IVPB 1000 milliGRAM(s) IV Intermittent every 12 hours  trimethoprim  40 mG/sulfamethoxazole 200 mG Suspension 80 milliGRAM(s) Oral <User Schedule>    Allergies    No Known Allergies          Vital Signs Last 24 Hrs  T(C): 36.9 (27 Jun 2022 11:00), Max: 37.2 (26 Jun 2022 23:00)  T(F): 98.4 (27 Jun 2022 11:00), Max: 99 (26 Jun 2022 23:00)  HR: 62 (27 Jun 2022 15:34) (43 - 62)  BP: 154/68 (26 Jun 2022 19:00) (130/55 - 154/68)  BP(mean): 98 (26 Jun 2022 19:00) (85 - 98)  RR: 15 (27 Jun 2022 13:00) (15 - 26)      LABS:  POCT Blood Glucose.: 154 mg/dL (06-27-22 @ 11:37)  POCT Blood Glucose.: 124 mg/dL (06-27-22 @ 05:11)  POCT Blood Glucose.: 85 mg/dL (06-27-22 @ 01:54)  POCT Blood Glucose.: 84 mg/dL (06-26-22 @ 23:05)  POCT Blood Glucose.: 104 mg/dL (06-26-22 @ 20:16)  POCT Blood Glucose.: 68 mg/dL (06-26-22 @ 19:42)  POCT Blood Glucose.: 106 mg/dL (06-26-22 @ 15:08)  POCT Blood Glucose.: 109 mg/dL (06-26-22 @ 11:13)  POCT Blood Glucose.: 132 mg/dL (06-26-22 @ 07:19)  POCT Blood Glucose.: 174 mg/dL (06-25-22 @ 21:22)  POCT Blood Glucose.: 252 mg/dL (06-25-22 @ 17:23)  POCT Blood Glucose.: 138 mg/dL (06-25-22 @ 12:11)  POCT Blood Glucose.: 113 mg/dL (06-25-22 @ 08:24)  POCT Blood Glucose.: 172 mg/dL (06-25-22 @ 03:07)  POCT Blood Glucose.: 169 mg/dL (06-24-22 @ 21:17)  POCT Blood Glucose.: 213 mg/dL (06-24-22 @ 17:17)                            8.7    3.89  )-----------( 425      ( 27 Jun 2022 04:13 )             27.8       06-27    141  |  108  |  36<H>  ----------------------------<  82  3.7   |  24  |  1.64<H>    eGFR: 46<L>    Ca    8.3<L>      06-27  Mg     1.9     06-27  Phos  3.6     06-27    TPro  5.4<L>  /  Alb  2.2<L>  /  TBili  0.2  /  DBili  x   /  AST  13  /  ALT  6<L>  /  AlkPhos  82  06-27      A1C with Estimated Average Glucose Result: 6.6 % (04-24-22 @ 17:12)  A1C with Estimated Average Glucose Result: 7.3 % (02-04-22 @ 13:42)  A1C with Estimated Average Glucose Result: 7.2 % (02-04-22 @ 05:48)      A/P:  66 yr old M with Type 2 DM A1C 6.6 c/b ESRD s/p DDRT on 3/21, CVA, CAD s/p CABG, Afib on apixaban, seizure activity, HTN, HLD, h/o GI bleed in 2/2022 EGD with duodenal ulcer, discharged to [email or web address] rehab center after prior hospitalization, now re-admitted from [email or web address] for seizures c/f status epilepticus in setting of UTI, requiring intubation for hypoxic respiratory failure and admission to MICU, ccb pleural effusion c/b bleeding s/p VATS. Now extubated and transferred to medicine floor. 6/21 s/p RRT code stroke and now in ICU w/seizures and intubated. BG goal (100-180mg/dl).     T2DM (plan)  -test BG Q6h  -c/w Admelog moderate correctional scale Q6h  -consider starting NPH if BG >200mg/dl x2 as TF increased to goal rate  Discharge  - Likely discharge on basal bolus insulin, Plan TBD based on insulin requirements at DC. Outpatient f/u with Endocrinologist Dr. Lincoln.      Can be reached via TEAMS/pager: [phone]  office:  [phone] (M-F 9a-5pm)               [phone] (nights/weekends)   [email or web address] password [email or web address]

## 2022-06-27 NOTE — PROGRESS NOTE ADULT - SUBJECTIVE AND OBJECTIVE BOX
Transplant Surgery Multidisciplinary Progress Note   --------------------------------------------------------------  R DCD DDRT    4/21/22    Present: Patient seen and examined with multidisciplinary team including Transplant Surgeon: Dr. Barber,  Nephrologist: Dr. Alfred/WALDO Pantoja and unit RN during am rounds.  Disciplines not in attendance will be notified of the plan.     HPI: 66M with PMH: DM type II, HTN, CAD s/p CABG in 2020, AFib on Eliquis, CVA in 2019 due to Afib, Seizure d/o following CVA, last episode was on 4/8/22, h/o GIB in 2/2022 EGD with duodenal ulcer.  ESRD due to DM was on HD since 2019.     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  Donor was 58 , KDPI 82%, DCD, single vessels and ureter, HLA mismatch 1, 2, 2. No DSA, cPRA 0%. 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 Re-admitted in May for anemia in the setting of large perinephric hematoma s/p evacuation and repair of arterial anastomosis on 5/2/22. Intra operative biopsy showed no rejection, Creatinine ranging ~2mg/dL.    In Rehab: He was recently dx with klebsiella UTI rx with ertapenem - then switched to Levaquin day #6.    He also had parainfluenza pneumonia. Was at rehab progressing well.      Hospital course:  - 6/10: transferred from rehab facility for seizure status epilepticus. Intubated for respiratory protection and transferred to MICU.  EEG showed no seizure activity. Neuro consulted.   - 6/11: Extubated. Found to have R pleural effusion. s/p Thoracentesis 1.3L. Eliquis changed to heparin drip.  Post procedure drop in H&H. 5u PRBC, 2 u FFP.    - 6/11 evening: Transferred to SICU for further care in setting of hypoxia, significant R pleural effusion, hgb 6 and hemodynamic instability  - 6/11 Intubated at 9pm and sedated on Precedex.  CT placed, 600ml blood drained.  1L total overnight, started pressors  - 6/11 Received Protamine for PTT >100 (was on Heparin drip started for AF), 2 u FFP and 5u PRBC total  - 6/13 s/p VATS 2.2L old blood removed; second chest tube placed  - 6/18-19: chest tubes removed  - 6/21: PRES on MRI, off Envarsus  - 6/25: Tx to SICU for refractory seizures    Interval Events:  - Periods of hypothermia, requiring warmer.  No pressors required, rest of vitals stable  - vent stable on 40/5  - per EEG, seizure activity appears to be slowing down. on regimen per Neuro  - at goal TFs      Immunosuppression:   Induction:  Thymoglobulin                                        Maintenance immunosuppression: Belatacept #1 6/23, due today for dose #2, but will HOLD, MMF remains on HOLD since 6/26,  continue Pred 5  Ongoing monitoring for signs of rejection.    Potential Discharge date: pending clinical improvement  Education:  Medications  Plan of care:  See Below      MEDICATIONS  (STANDING):  amLODIPine   Tablet 10 milliGRAM(s) Oral daily  atorvastatin 40 milliGRAM(s) Oral at bedtime  chlorhexidine 0.12% Liquid 15 milliLiter(s) Oral Mucosa every 12 hours  chlorhexidine 2% Cloths 1 Application(s) Topical <User Schedule>  epoetin cortney-epbx (RETACRIT) Injectable 54118 Unit(s) SubCutaneous every 7 days  fluconAZOLE IVPB 200 milliGRAM(s) IV Intermittent every 24 hours  fosphenytoin IVPB 100 milliGRAM(s) PE IV Intermittent every 8 hours  heparin  Infusion 800 Unit(s)/Hr (12 mL/Hr) IV Continuous <Continuous>  insulin lispro (ADMELOG) corrective regimen sliding scale   SubCutaneous every 4 hours  levothyroxine Injectable 40 MICROGram(s) IV Push at bedtime  meropenem  IVPB 1000 milliGRAM(s) IV Intermittent every 12 hours  pantoprazole  Injectable 40 milliGRAM(s) IV Push daily  perampanel 4 milliGRAM(s) Oral at bedtime  polyethylene glycol 3350 17 Gram(s) Oral daily  predniSONE  Solution 5 milliGRAM(s) Oral daily  senna Syrup 10 milliLiter(s) Oral daily  trimethoprim  40 mG/sulfamethoxazole 200 mG Suspension 80 milliGRAM(s) Oral <User Schedule>  valproate sodium  IVPB 500 milliGRAM(s) IV Intermittent every 8 hours    MEDICATIONS  (PRN):  HYDROmorphone  Injectable 0.5 milliGRAM(s) IV Push every 3 hours PRN Moderate Pain (4 - 6)            Vital Signs Last 24 Hrs  T(C): 2.3 (27 Jun 2022 16:00), Max: 37.2 (26 Jun 2022 23:00)  T(F): 36.3 (27 Jun 2022 16:00), Max: 99 (26 Jun 2022 23:00)  HR: 55 (27 Jun 2022 17:00) (45 - 62)  BP: 154/68 (26 Jun 2022 19:00) (154/68 - 154/68)  BP(mean): 98 (26 Jun 2022 19:00) (98 - 98)  RR: 14 (27 Jun 2022 17:00) (14 - 26)  SpO2: 100% (27 Jun 2022 17:00) (100% - 100%)    I&O's Summary    26 Jun 2022 07:01  -  27 Jun 2022 07:00  --------------------------------------------------------  IN: 2337.5 mL / OUT: 2290 mL / NET: 47.5 mL    27 Jun 2022 07:01  -  27 Jun 2022 18:04  --------------------------------------------------------  IN: 913.5 mL / OUT: 500 mL / NET: 413.5 mL                              8.7    3.89  )-----------( 425      ( 27 Jun 2022 04:13 )             27.8     06-27    141  |  108  |  36<H>  ----------------------------<  82  3.7   |  24  |  1.64<H>    Ca    8.3<L>      27 Jun 2022 02:00  Phos  3.6     06-27  Mg     1.9     06-27    TPro  5.4<L>  /  Alb  2.2<L>  /  TBili  0.2  /  DBili  x   /  AST  13  /  ALT  6<L>  /  AlkPhos  82  06-27          Culture - Bronchial (collected 06-26-22 @ 18:08)  Source: Combi-Cath Combi-Cath  Gram Stain (06-27-22 @ 11:17):    Few polymorphonuclear leukocytes per low power field    Rare Squamous Epithelial Cells per low power field    Rare Gram Positive Rods per oil power field    Culture - Blood (collected 06-26-22 @ 09:01)  Source: .Blood Blood-Peripheral  Preliminary Report (06-27-22 @ 15:02):    No growth to date.    Culture - Blood (collected 06-26-22 @ 08:14)  Source: .Blood Blood-Peripheral  Preliminary Report (06-27-22 @ 15:02):    No growth to date.    Culture - Urine (collected 06-26-22 @ 06:25)  Source: Catheterized Catheterized  Final Report (06-27-22 @ 12:20):    No growth    Culture - Blood (collected 06-21-22 @ 09:02)  Source: .Blood Blood-Peripheral  Final Report (06-26-22 @ 14:00):    No Growth Final                  REVIEW OF SYSTEMS  --------------------------------------------------------------------------------  unable to assess, AMS, intubated, no sedation        PHYSICAL EXAM:  Constitutional: Well developed / well nourished  Eyes: Anicteric, PERRLA  ENMT: nc/at  Neck: supple  Respiratory: CTA   Cardiovascular: RRR  Gastrointestinal: Soft, non distended, NT, incision healed   Genitourinary: tucker   Extremities: SCD's in place and working bilaterally, RUE pitting edema, improving  Vascular: Palpable dp pulses bilaterally  Neurological: lethargic. not following commands/questions. no tremors noted, intubated  Skin: no rashes, ulcerations or lesions  Musculoskeletal: Moving all extremities, global weakness, greatest at RUE.   Psychiatric: lethargic

## 2022-06-27 NOTE — PROGRESS NOTE ADULT - PROBLEM SELECTOR PLAN 1
S/p DDRT on 4/21/22 Cr. stable in 2 range. ANA in the setting of hemodynamic instability 2/2 ATN. Donor was 58 , KDPI 82%, DCD, single vessels and ureter, HLA mismatch 1, 2, 2. No DSA, cPRA 0%. 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 Readmitted in May for anemia in the setting of large perinephric hematoma s/p evacuation and repair of arterial anastomosis on 5/2/22. Intra operative biopsy showed no rejection. As outpatient SCr. in th low 2 range. Admitted with lowest documented Cr. of 1.8 since transplant which has trended to 1.6 today. S/p Ertapenem for UTI + 1 dose on 6/22. Broad spectrum Abx resumed (On Vanc and MErrem). F/u ID for recs on antiviral. CXR reviewed. Optimize hemodynamics. Adequate UOP. C/w Amlodipine 10mg Daily for hypertension. Repeat MRI and then LP.     C/w AED as per Neurology. Now on fophenytoin, VPA and Peramparel. F/u Neurology.

## 2022-06-27 NOTE — PROGRESS NOTE ADULT - TIME BILLING
Kidney recipient with functioning allograft  Critically ill, intubated , in ICU  HTN, DM,  HLD, CVA, Seizures, Leukopenia  S/p VATS  Intubated on combination regimen for seizure management, Empiric antimicrobial coverage  Reviewed immunosuppression, management regimen for comorbidities  On modified immunosuppression, currently on Steroids  Suggestions:  Neuro work up as planned including imaging and CSF studies  Discussed with team regarding w/u for CNS infections  ID and Neuro follow up  D/w SICU attending and transplant team  I was present during and reviewed clinical and lab data as well as assessment and plan as documented by the house staff as noted. Please contact if any additional questions with any change in clinical condition or on availability of any additional information or reports.

## 2022-06-27 NOTE — EEG REPORT - NS EEG TEXT BOX
[facility]   COMPREHENSIVE EPILEPSY CENTER   REPORT OF LONG-TERM VIDEO EEG     [facility]: [address], [place] [address], Ph#: [phone]  [facility]: [address] [place] [address], Ph#: [phone]  [facility]: [address] [place] [address], Ph#: [phone]    Patient Name: CHAD DUNBAR  Age and : 66y ([birth year])  MRN #: [id number]  Location: [place] [address]  Referring Physician: Lizz Davis    Start Time/Date: 08:00 on 22  End Time/Date: 08:00 on 22  Duration: 24 h    _____________________________________________________________  STUDY INFORMATION    EEG Recording Technique:  The patient underwent continuous Video-EEG monitoring, using Telemetry System hardware on the XLTek Digital System. EEG and video data were stored on a computer hard drive with important events saved in digital archive files. The material was reviewed by a physician (electroencephalographer / epileptologist) on a daily basis. Ramon and seizure detection algorithms were utilized and reviewed. An EEG Technician attended to the patient, and was available throughout daytime work hours.  The epilepsy center neurologist was available in person or on call 24-hours per day.    EEG Placement and Labeling of Electrodes:  The EEG was performed utilizing 20 channel referential EEG connections (coronal over temporal over parasagittal montage) using all standard 10-20 electrode placements with EKG, with additional electrodes placed in the inferior temporal region using the modified 10-10 montage electrode placements for elective admissions, or if deemed necessary. Recording was at a sampling rate of 256 samples per second per channel. Time synchronized digital video recording was done simultaneously with EEG recording. A low light infrared camera was used for low light recording.     _____________________________________________________________  HISTORY    Patient is a 66y old  Male who presents with a chief complaint of Status Epilepticus (2022 00:42)      PERTINENT MEDICATION:  MEDICATIONS  (STANDING):  amLODIPine   Tablet 10 milliGRAM(s) Oral daily  atorvastatin 40 milliGRAM(s) Oral at bedtime  chlorhexidine 0.12% Liquid 15 milliLiter(s) Oral Mucosa every 12 hours  chlorhexidine 2% Cloths 1 Application(s) Topical <User Schedule>  epoetin cortney-epbx (RETACRIT) Injectable 85940 Unit(s) SubCutaneous every 7 days  fluconAZOLE IVPB 200 milliGRAM(s) IV Intermittent every 24 hours  fosphenytoin IVPB 100 milliGRAM(s) PE IV Intermittent every 8 hours  heparin  Infusion 800 Unit(s)/Hr (9.5 mL/Hr) IV Continuous <Continuous>  insulin lispro (ADMELOG) corrective regimen sliding scale   SubCutaneous every 6 hours  levothyroxine Injectable 40 MICROGram(s) IV Push at bedtime  meropenem  IVPB 1000 milliGRAM(s) IV Intermittent every 12 hours  pantoprazole  Injectable 40 milliGRAM(s) IV Push daily  perampanel 4 milliGRAM(s) Oral at bedtime  polyethylene glycol 3350 17 Gram(s) Oral daily  predniSONE  Solution 5 milliGRAM(s) Oral daily  senna Syrup 10 milliLiter(s) Oral daily  trimethoprim  40 mG/sulfamethoxazole 200 mG Suspension 80 milliGRAM(s) Oral <User Schedule>  valGANciclovir 50 mG/mL Oral Solution 450 milliGRAM(s) Oral <User Schedule>  valproate sodium  IVPB 1000 milliGRAM(s) IV Intermittent every 8 hours  vancomycin  IVPB 750 milliGRAM(s) IV Intermittent once    _____________________________________________________________  STUDY INTERPRETATION    Findings: The background was continuous, spontaneously variable and reactive. During brief wakefulness, the posterior dominant rhythm consisted of symmetric, well-modulated 6-7 Hz activity. Brief burst suppression pattern around 16:00 when patient on propofol.    Background Slowing:  Diffuse theta and polymorphic delta slowing.    Focal Slowing:   Continuous theta/delta slowing in the left frontotemporal region.     Sleep Background:  Drowsiness was characterized by fragmentation, attenuation, and slowing of the background activity.    Sleep was characterized by the presence of vertex waves, symmetric rudimentary sleep spindles and K complexes.    Non-Epileptiform Findings:  None were present.    Interictal Epileptiform Activity:   None were present.    Events:  Clinical events: None recorded.  Seizures: None recorded.    Activation Procedures:   Hyperventilation was not performed.    Photic stimulation was not performed.     Artifacts:  Intermittent myogenic and movement artifacts were noted.    ECG:  The heart rate on single channel ECG was predominantly between 50-55 bpm.    _____________________________________________________________  EEG SUMMARY/CLASSIFICATION    Abnormal EEG in an altered / a sedated patient.  - Left frontotemporal slowing  - Mild to moderate generalized slowing    _____________________________________________________________  EEG IMPRESSION/CLINICAL CORRELATE    Abnormal EEG study.  Structural or functional abnormality in the left frontotemporal region.  Mild to moderate nonspecific diffuse or multifocal cerebral dysfunction.   Sinus bradycardia on single lead ECG.  No epileptiform pattern or seizure seen.    _____________________________________________________________    Irma Bennett DO  Attending Physician, [facility] [place]

## 2022-06-27 NOTE — PROGRESS NOTE ADULT - ASSESSMENT
Patient is a 67 y/o male w/ a PMHx of CAD s/p CABG (2020), AF on Eliquis c/b CVA (2019) c/b seizures, HTN, HLD, DM type II, hypothyroidism, GI bleed due to a duodenal ulcer (2/2022), and ESRD s/p DDRT (4/21/22) c/b DGF requiring HD (last session 4/29/22) & large perinephric hematoma s/p evacuation w/ revision of arterial anastomosis (5/2/22) who presented on 6/10 for status epilepticus. Course complicated by hemothorax, s/p VATS, now with recurrent refractory seizures secondary to tacrolimus toxicity  vs PRES syndrome vs meningitis and intubated for airway protection.      Neuro: Seizures with concern of status vs PRES syndrome  -MS: worsened since intubation. Pt no longer follow commands but opens eyes to voice and withdraws from pain in all extremities.   - propofol d/darren 2/2 bradycardia  - ativan and vimpat d/darren 2/2 lethargy    - continue vEEG  - antiepileptics: valproate, Fycompa, and reloaded with 700mg of fosphenytoin on 100mg q8   -will draw Depakote and fosphenytoin level in the am  -ammonia level 18  -dilaudid q3 PRN for pain control   - followed by neurology  - plan for LP on 6/29    Resp: s/p VATS evacuation of hemothorax now intubated for airway protection  - chest tubes removed  - PRVC 450/16/40/5   - ABG 7.42/40/257  -CXR s/p intubation shows new R sided opacification suspicion for asp pna  - see ID section for abx coverage       CVS: Hypertension, Afib  - home coreg and nifedipine held in setting of bradycardia  - HR NSR salima    - pt HTN to 180s overnight. Given 10 of hydralazine and restarted on home amlodipine   - atorvastatin 40mg  - will cont to monitor   -lactate cleared      GI: TF  - TF jevity 1.5 goal 50cc/hr  - Bowel regimen with senna & Miralax  - continue home protonix    Renal: ESRD s/p DDRT (4/21/22) c/b DGF requiring HD (last session 4/29/22) & large perinephric hematoma s/p evacuation w/ revision of arterial anastomosis (5/2/22)   - ANA improving with good urine output   - tucker in place for strict I/Os   - replete electrolytes as needed   - continue prednisone 5mg daily, Valcyte and Retacrit; mycophenolic acid d/darren for possibility of sepsis as per transplant     Heme: RUE and RIJ DVT + A/C for afib  - transitioned from eliquis to heparin gtt for planned LP on 6/28  - Monitor CBC and coags   - SCD's    ID: asp pna sepsis vs CNS infx   - currently hypothermic with wbc decreasing   - bcx/combicath, mrsa sent. UA neg. CXR demonstrating increasing R opacity   - Abx: Meropenem for hx of ESBL in urine, Vanco, fluconazole for empiric covg   - ppx ABX: bactrim and valganciclovir  - checking CMV, EBV, HHV6, Parvovirus, Adenovirus PCR as per transplant  - plan for LP on 6/28    Endo: hx of DM, Hypothyroidism  - ISS qhs , lantus 4U and 4U premeal admelog  - for HLD : atorvastatin 40mg bedtime  - for hypothyroidism : levothyroxine     GOC:   full code Patient is a 67 y/o male w/ a PMHx of CAD s/p CABG (2020), AF on Eliquis c/b CVA (2019) c/b seizures, HTN, HLD, DM type II, hypothyroidism, GI bleed due to a duodenal ulcer (2/2022), and ESRD s/p DDRT (4/21/22) c/b DGF requiring HD (last session 4/29/22) & large perinephric hematoma s/p evacuation w/ revision of arterial anastomosis (5/2/22) who presented on 6/10 for status epilepticus. Course complicated by hemothorax, s/p VATS, now with recurrent refractory seizures secondary to tacrolimus toxicity  vs PRES syndrome vs meningitis and intubated for airway protection.      Neuro: Seizures with concern of status vs PRES syndrome  -MS: worsened since intubation. Pt no longer follow commands but opens eyes to voice and withdraws from pain in all extremities.   - propofol d/darren 2/2 bradycardia  - ativan and vimpat d/darren 2/2 lethargy    - continue vEEG  - antiepileptics: valproate, Fycompa, and reloaded with 700mg of fosphenytoin on 100mg q8   -will draw Depakote and fosphenytoin level in the am  -ammonia level 18  -dilaudid q3 PRN for pain control   - followed by neurology  - plan for LP on 6/29    Resp: s/p VATS evacuation of hemothorax now intubated for airway protection  - chest tubes removed  - PRVC 450/16/40/5   - ABG 7.42/40/257  -CXR s/p intubation shows new R sided opacification suspicion for asp pna  - see ID section for abx coverage       CVS: Hypertension, Afib  - home coreg and nifedipine held in setting of bradycardia  - HR sinus salima    - pt HTN to 180s overnight. Given 10 of hydralazine and restarted on home amlodipine   - trops 52-->61. EKG shows sinus salima with LBBB unchanged from baseline. Trend trops to peak  - atorvastatin 40mg  - will cont to monitor   -lactate cleared      GI: TF  - TF jevity 1.5 goal 50cc/hr  - Bowel regimen with senna & Miralax  - continue home protonix    Renal: ESRD s/p DDRT (4/21/22) c/b DGF requiring HD (last session 4/29/22) & large perinephric hematoma s/p evacuation w/ revision of arterial anastomosis (5/2/22)   - AAN improving with good urine output   - tucker in place for strict I/Os   - replete electrolytes as needed   - continue prednisone 5mg daily, Valcyte and Retacrit; mycophenolic acid d/darren for possibility of sepsis as per transplant     Heme: RUE and RIJ DVT + A/C for afib  - transitioned from eliquis to heparin gtt for planned LP on 6/28  - Monitor CBC and coags   - SCD's    ID: asp pna sepsis vs CNS infx   - currently hypothermic with wbc decreasing   - bcx/combicath, mrsa sent. UA neg. CXR demonstrating increasing R opacity   - Abx: Meropenem for hx of ESBL in urine, Vanco, fluconazole for empiric covg   - ppx ABX: bactrim and valganciclovir  - checking CMV, EBV, HHV6, Parvovirus, Adenovirus PCR as per transplant  - plan for LP on 6/28    Endo: hx of DM, Hypothyroidism  - ISS qhs , lantus 4U and 4U premeal admelog  - for HLD : atorvastatin 40mg bedtime  - for hypothyroidism : levothyroxine     GOC:   full code

## 2022-06-27 NOTE — PROGRESS NOTE ADULT - ASSESSMENT
Epilepsy, intractable, with status epilepticus  Encephalopathy  S/p renal transplant, ANA  Prior strokes  Atrial fibrillation  CAD  HTN  DM type 2    - Patient with continued non-convulsive electrographic seizures, refractory to current therapy. Mental status also likely worsening. He is hypothermic with lower WBC. Although CNS infection should be on the differential, especially given immunocompromised state with renal transplant, given low temperature and low WBC would favor more systemic process as opposed to primary CNS. Appreciate ID input and currently recommending antibiotic therapy with meropenem  - vEEG to continue to observe for seizures. If no further by tomorrow (6/28) can then stop  - VPA level low but also on phenytoin so level not reliable. Would decrease to VPA 500mg IV [id number] for maintenance and likely off in next 24-48 hours. Please check new phenytoin trough (30-60 minutes before dose) and LFT's tomorrow (6/28) AM  - Continue Fycompa at 4mg PO bedtime  - Continue fosphenytoin maintenance with 100mg [id number]  - Vimpat stopped. Due to sedation also stopped standing Ativan  - Agree with holding Eliquis for now given possibly need for LP in the next few days (recommend at least 3 days, preferably 5 days if not emergent need). Earliest would be Tuesday (6/28) but preferably Thursday (6/30)  - Recommend repeat MRI brain w/ and w/o as no clear evidence of infection, inflammation, or acute CNS event on prior study. 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

## 2022-06-27 NOTE — PROGRESS NOTE ADULT - ASSESSMENT
66 yr old Male with PMHx ESRD s/p permacath removal 5/10/22 s/p Rt DDRT 4/21/22,  CVA (2019), Afib on apixaban, seizure activity, CAD s/p stents, CABG (2020), HTN, HLD, DM on insulin, gastric/duodenal ulcer, recent hospitalization 4/28/22 -5/10/22 with weakness/anemia found to have perinephric hematoma requiring evacuation and repair of bleeding arterial anastomosis. Curently being treated for UTI with Levaquin Today after developing multiple sz episodes refractory to 5 mg versed IM (EMS) and 2 mg ativan IV in E.D. concerning for status epilepticus requiring intubation for hypoxic respiratory  failure. MICU Consult was called for hypoxic respiratory failure secondary to status epilepticus.  Nephrology consulted for renal failure.     A/P  DDRT on 04/21/22  Course complicated by DGF, was on HD until 4/29/22. Readmitted in May for anemia in the setting of large perinephric hematoma s/p evacuation and repair of arterial anastomosis on 5/2/22. Intra operative biopsy showed no rejection.  ANA likely sec to  hemodynamic change   scr improving today   optimize glucose   monitor BMP, U/O     HTN   acceptable    monitor BP closely     Immunosuppresssion  continue per transplant team

## 2022-06-27 NOTE — PROGRESS NOTE ADULT - ASSESSMENT
66M with h/o DM type II, HTN, CAD s/p CABG in 2020, Afib on Eliquis, CVA in 2019 due to Afib, Seizure d/o (last episode was on 4/8/22), h/o GI bleed in 2/2022 EGD with duodenal ulcer and ESRD on HD s/p R DDRT from DCD donor on 4/21/22 complicated by DGF requiring HD until 4/29/22 now with good graft function who presented with status epilepticus in setting of UTI/antibiotics and sub-therapeutic valproic acid level       Seizure Disorder  - repeat MRI head today with less concerns for PRES, ischemic changes  - EEG ongoing: less seizure activity today  - Antiepileptic regimen per Neurology, appreciate recs  - Keep Mag > 2  - LP in the next 24-48H, once safe off Eliquis  - remainder of care per SICU team       R DCD DDRT (ureter stented) 4/21/22  - mild ANA, will monitor   - Strict I/Os  - Regular diet  - will remove stent prior to discharge  - send CMV PCR  - ID consulted in setting of hyperthermia, decreasing wbc     Immunosuppression:   - Belatacept #1 6/23.  HOLDING further doses (next dose due 6/27)  -  MMF  HOLD  Pred 5  - PPX: Valcyte (MWF), completed Fluconazole, on Nystatin. on Bactrim MWF    R Pleural effusion  - resolved, chest tubes removed  - persistent RML opacity at site of prior chest tubes on daily CXRs.  Awaiting Thoracic to comment  - off ABX per Transplant ID    DM  - on Lantus/Lispro, Endocrine following     AFib:  - S/p 1U PRBC 6/24 for anemia   - continue Retacrit weekly  - Eliquis on hold pending possible LP     HTN:  - continue Nifedipine  - Keep SBP < 150  - on coreg 6.25 bid     Dispo  - PT following: OK    66M with h/o DM type II, HTN, CAD s/p CABG in 2020, Afib on Eliquis, CVA in 2019 due to Afib, Seizure d/o (last episode was on 4/8/22), h/o GI bleed in 2/2022 EGD with duodenal ulcer and ESRD on HD s/p R DDRT from DCD donor on 4/21/22 complicated by DGF requiring HD until 4/29/22 now with good graft function who presented with status epilepticus in setting of UTI/antibiotics and sub-therapeutic valproic acid level     Seizure Disorder  - repeat MRI head today with less concerns for PRES, ischemic changes  - EEG ongoing: less seizure activity today  - Antiepileptic regimen per Neurology, appreciate recs  - Keep Mag > 2  - LP in the next 24-48H, once safe off Eliquis.   - vent per SICU    R DCD DDRT (ureter stented) 4/21/22  - renal function stable  - Strict I/Os, tucker/OGT  - TFs to goal  - will remove stent prior to discharge  - Transplant ID following, appreciate recs.  S/p Ganciclovir 2.5mg/kg x1 today.  Continue Andres/Fluc/Vanc by level.  f/u viral panel in setting of hypothermia    Immunosuppression:   - Belatacept #1 6/23.  HOLDING further doses (next dose due 6/27)  - MMF on HOLD  - continue Pred 5  - PPx: see above.  continue Bactrim MWF    DM  - on Lantus/Lispro, Endocrine following     AFib:  - S/p 1U PRBC 6/24 for anemia   - continue Retacrit weekly  - Eliquis on hold pending possible LP     R IJ DVT  - on hep gtt, Eliquis on HOLD    HTN:  - continue Nifedipine/Coreg  - Keep SBP < 150    Dispo  - continue excellent SICU care

## 2022-06-27 NOTE — PROGRESS NOTE ADULT - SUBJECTIVE AND OBJECTIVE BOX
NEUROLOGY FOLLOW-UP CONSULT NOTE    RFC: Seizures    Interval history: Patient with resolved electrographic non-convulsive seizures over past 24 hours. vEEG connected at bedside. He was intubated for airway protection. No additional acute neurologic events overnight.    Meds:  MEDICATIONS  (STANDING):  amLODIPine   Tablet 10 milliGRAM(s) Oral daily  atorvastatin 40 milliGRAM(s) Oral at bedtime  chlorhexidine 0.12% Liquid 15 milliLiter(s) Oral Mucosa every 12 hours  chlorhexidine 2% Cloths 1 Application(s) Topical <User Schedule>  epoetin cortney-epbx (RETACRIT) Injectable 55958 Unit(s) SubCutaneous every 7 days  fluconAZOLE IVPB 200 milliGRAM(s) IV Intermittent every 24 hours  fosphenytoin IVPB 100 milliGRAM(s) PE IV Intermittent every 8 hours  heparin  Infusion 800 Unit(s)/Hr (10.5 mL/Hr) IV Continuous <Continuous>  insulin lispro (ADMELOG) corrective regimen sliding scale   SubCutaneous every 6 hours  levothyroxine Injectable 40 MICROGram(s) IV Push at bedtime  meropenem  IVPB 1000 milliGRAM(s) IV Intermittent every 12 hours  pantoprazole  Injectable 40 milliGRAM(s) IV Push daily  perampanel 4 milliGRAM(s) Oral at bedtime  polyethylene glycol 3350 17 Gram(s) Oral daily  predniSONE  Solution 5 milliGRAM(s) Oral daily  senna Syrup 10 milliLiter(s) Oral daily  trimethoprim  40 mG/sulfamethoxazole 200 mG Suspension 80 milliGRAM(s) Oral <User Schedule>  valGANciclovir 50 mG/mL Oral Solution 450 milliGRAM(s) Oral <User Schedule>  valproate sodium  IVPB 500 milliGRAM(s) IV Intermittent every 8 hours  vancomycin  IVPB 750 milliGRAM(s) IV Intermittent once    MEDICATIONS  (PRN):  HYDROmorphone  Injectable 0.5 milliGRAM(s) IV Push every 3 hours PRN Moderate Pain (4 - 6)    GTT:  Heparin 950 Units/hour    PMHx/PSHx/FHx/SHx:  Acute respiratory failure with hypoxia    No pertinent family history in first degree relatives    No pertinent family history in first degree relatives    No pertinent family history in first degree relatives    FH: HTN (hypertension)    No pertinent family history in first degree relatives    Family history of cancer of tongue (Father)    Handoff    MEWS Score    Hypertension    Diabetes    Dyslipidemia    CAD (Coronary Artery Disease)    CAD (Coronary Artery Disease)    CRI (Chronic Renal Insufficiency)    Hypothyroidism    CVA (cerebral vascular accident)    Anemia    PEG (percutaneous endoscopic gastrostomy) status    Intubation of airway performed without difficulty    ESRD on dialysis    Seizures    Acute respiratory failure with hypoxia    Renal transplant recipient    Immunosuppressive management encounter following kidney transplant    Hemothorax    Hypothyroidism    Type 2 diabetes mellitus with hypoglycemia, with long-term current use of insulin    HTN (hypertension)    HLD (hyperlipidemia)    No significant past surgical history    History of insertion of stent into coronary artery bypass graft    S/P CABG x 3    SEIZURE    30    Convulsive status epilepticus    [email or web address]        Allergies:  No Known Allergies      ROS: Due to clinical condition unable to assess (PERRY)    O:  T(C): 36.9 (06-27-22 @ 11:00), Max: 37.2 (06-26-22 @ 23:00)  HR: 57 (06-27-22 @ 11:00) (43 - 62)  BP: 154/68 (06-26-22 @ 19:00) (130/55 - 166/78)  RR: 16 (06-27-22 @ 11:00) (16 - 26)  SpO2: 100% (06-27-22 @ 11:00) (98% - 100%)    Focused neurologic exam:  MS - Obtunded to stuporous, briefly attends to central > appendicular noxious stimuli b/l but no clear auditory or visual, no speech output, does not follow commands. PERRY orientation, rep/naming, attn/conc, recent and remote memory, fund of knowledge  CN - PERRL, EOMI by OCR, (+) face sens/str by corneals b/l, (+) spontaneous respirations (vent rate 16 bpm, patient rate 19 bpm), (+) cough. PERRY VF, hearing, tongue/palate, trap/SCM  Motor - Normal bulk. Inc tone in L side (paratonic?) and dec tone in R side. Spontaneous movements of L side. Grimaces, withdraws, and localizes to strong noxious in LUE 4/5, LLE 2+/5, R side 0/5 (but does grimace)  Sens - LT/temp intact all, as above  DTR's - 2+ BUE's, 3+ R KJ, 2+ L KJ, 1+ AJ b/l, and upgoing R and downgoing L plantar response  Coord - PERRY  Gait and station - PERRY    Pertinent labs/studies:  PT/INR inc 17.5/1.50, PTT inc 52.4  CBC with low H/H 9/28 and MCV WNL, inc plt 425, otherwise essentially WNL  BMP with inc BUN/Cr 36/1.64 (GFR dec 46), otherwise essentially WNL  Mg WNL, Phos WNL  Albumin dec 2.2, LFT's WNL  NH3 WNL    VPA level (6/26) - 27, dec  VPA level (6/27) - 31, dec  Phenytoin level (6/26) - 6.4, dec    vEEG 6/27 -  EEG SUMMARY/CLASSIFICATION    Abnormal EEG in an altered / a sedated patient.  - Left frontotemporal slowing  - Mild to moderate generalized slowing    _____________________________________________________________  EEG IMPRESSION/CLINICAL CORRELATE    Abnormal EEG study.  Structural or functional abnormality in the left frontotemporal region.  Mild to moderate nonspecific diffuse or multifocal cerebral dysfunction.   Sinus bradycardia on single lead ECG.  No epileptiform pattern or seizure seen.    < from: CT Head No Cont (06.24.22 @ 10:29) >    ACC: [id number] EXAM:  CT BRAIN                          PROCEDURE DATE:  06/24/2022      INTERPRETATION:  .    CLINICAL INFORMATION: Worsening lethargy. Altered mental status.    TECHNIQUE: Multiple axial CT images of the head were obtained without   contrast. Sagittal and coronal reconstructed images were acquired from   the source data.    COMPARISON: Prior head CT study from 6/21/2022. Prior brain MRI study   from 6/20/2022.    FINDINGS: Extensive encephalomalacia and gliosis are again noted within   the high bilateral cerebral hemispheres.. Overall appearance is unchanged.    There is no acute intracranial hemorrhage, mass effect, shift of the   midline structures, herniation, hydrocephalus, abnormal extra-axial fluid   collection.    There is diffuse cerebral volume loss with prominence of the sulci,   fissures, and cisternal spaces which is normal for the patient's age.   There is moderate patchy confluent periventricular white matter   hypoattenuation statistically compatible with microvascular changes given   calcific atherosclerotic disease of the intracranial arteries.    The paranasal sinuses and mastoid air cells are clear. The calvarium   appears intact. There is evidence of bilateral cataract removal.    IMPRESSION:No acute intracranial hemorrhage.    Similar-appearing extensive encephalomalacia and gliosis within the   bilateral high cerebral hemispheres.    Similar-appearing moderate severity chronic white matter microvascular   type changes.

## 2022-06-27 NOTE — CHART NOTE - NSCHARTNOTEFT_GEN_A_CORE
Nutrition Follow Up Note  Patient seen for: nutrition follow up/ICU transfer.     Chart reviewed, events noted. Patient is a 67 y/o male w/ a PMHx of CAD s/p CABG (), AF on Eliquis c/b CVA () c/b seizures, HTN, HLD, DM type II, hypothyroidism, GI bleed due to a duodenal ulcer (2022), and ESRD s/p DDRT (22) c/b DGF requiring HD (last session 22) & large perinephric hematoma s/p evacuation w/ revision of arterial anastomosis (22) who presented on 6/10 for status epilepticus. Course complicated by hemothorax, s/p VATS, now with recurrent refractory seizures secondary to tacrolimus toxicity  vs PRES syndrome vs meningitis and intubated for airway protection.    Source: [] Patient       [x] EMR        [x] RN        [x] Family at bedside       [] Other:    -If unable to interview patient: [x] Trach/Vent/BiPAP  [] Disoriented/confused/inappropriate to interview    Diet Order:   Diet, NPO with Tube Feed:   Tube Feeding Modality: Orogastric  Jevity 1.5 Tyson (JEVITY1.5RTH)  Total Volume for 24 Hours (mL): 1200  Continuous  Starting Tube Feed Rate {mL per Hour}: 10  Increase Tube Feed Rate by (mL): 10     Every 4 hours  Until Goal Tube Feed Rate (mL per Hour): 50  Tube Feed Duration (in Hours): 24  Tube Feed Start Time: 17:00 (22)    - Is current order appropriate/adequate? [] Yes  [x]  No: See recommendations below.     - Current EN regimen provides: 1200ml total volume, 1800kcal   - EN started overnight, currently infusing at goal rate of 50ml/hr.     - Nutrition-related concerns:   - Pt's son at bedside, states prior to intubation and change in mental status pt eating well, consuming 90% of meals son was present for. Per flow sheets since previous RD follow up () pt with documented intake <50% of meals/supplements.    - Pt remains on Prednisone s/p DDRT. Sliding scale insulin ordered for glycemic management.   - IV Levothyroxine noted    GI:  Last BM 6/27 x 1.   Bowel Regimen? [x] Yes - Miralax, senna syrup    Weights:   Daily Weight in k.2 (), Weight in k.6 (), Weight in k.9 (), Weight in k.9 (), Weight in k.8 (), Weight in k.8 ()  weight fluctuations noted, pt with edema - will continue to monitor     Nutritionally Pertinent MEDICATIONS  (STANDING):  amLODIPine   Tablet  atorvastatin  fluconAZOLE IVPB  ganciclovir IVPB  insulin lispro (ADMELOG) corrective regimen sliding scale  levothyroxine Injectable  meropenem  IVPB  pantoprazole  Injectable  polyethylene glycol 3350  predniSONE  Solution  senna Syrup  trimethoprim  40 mG/sulfamethoxazole 200 mG Suspension  vancomycin  IVPB    Pertinent Labs:  @ 02:00: Na 141, BUN 36<H>, Cr 1.64<H>, BG 82, K+ 3.7, Phos 3.6, Mg 1.9, Alk Phos 82, ALT/SGPT 6<L>, AST/SGOT 13, HbA1c --   @ 18:14: Na 143, BUN 42<H>, Cr 1.74<H>, BG 77, K+ 3.9, Phos 3.8, Mg 2.0, Alk Phos 81, ALT/SGPT 9<L>, AST/SGOT 13, HbA1c --    A1C with Estimated Average Glucose Result: 6.6 % (22 @ 17:12)  A1C with Estimated Average Glucose Result: 7.3 % (22 @ 13:42)  A1C with Estimated Average Glucose Result: 7.2 % (22 @ 05:48)    Finger Sticks:  POCT Blood Glucose.: 154 mg/dL ( @ 11:37)  POCT Blood Glucose.: 124 mg/dL ( @ 05:11)  POCT Blood Glucose.: 85 mg/dL ( @ 01:54)  POCT Blood Glucose.: 84 mg/dL ( @ 23:05)  POCT Blood Glucose.: 104 mg/dL ( @ 20:16)  POCT Blood Glucose.: 68 mg/dL ( @ 19:42)  POCT Blood Glucose.: 106 mg/dL ( @ 15:08)    Skin per nursing documentation: DTI dave. buttocks, stage II sacrum  Edema: 2+ bilateral arm, wrist, hand    Estimated Needs: based on dosing wt 61.7kg  [] no change since previous assessment  [x] recalculated: with consideration for intubation    Previous Nutrition Diagnosis: Severe Malnutrition, Increased Nutrient Needs  Nutrition Diagnosis is: [x] ongoing  [] resolved [] not applicable     New Nutrition Diagnosis: [x] Not applicable    Nutrition Care Plan:  [x] In Progress -  being addressed with EN while pt intubated  [] Achieved  [] Not applicable    Nutrition Interventions:     Education Provided:       [] Yes:  [] No:     Recommendations:        ****IN PROGRESS/INCOMPLETE****       Monitoring and Evaluation:   Continue to monitor nutritional intake, tolerance to diet prescription, weights, labs, skin integrity    RD remains available upon request and will follow up per protocol    Yamile Viveros MS, RD, CDN, [facility] #[phone] Nutrition Follow Up Note  Patient seen for: nutrition follow up/ICU transfer.     Chart reviewed, events noted. Patient is a 67 y/o male w/ a PMHx of CAD s/p CABG (), AF on Eliquis c/b CVA () c/b seizures, HTN, HLD, DM type II, hypothyroidism, GI bleed due to a duodenal ulcer (2022), and ESRD s/p DDRT (22) c/b DGF requiring HD (last session 22) & large perinephric hematoma s/p evacuation w/ revision of arterial anastomosis (22) who presented on 6/10 for status epilepticus. Course complicated by hemothorax, s/p VATS, now with recurrent refractory seizures secondary to tacrolimus toxicity  vs PRES syndrome vs meningitis and intubated for airway protection.    Source: [] Patient       [x] EMR        [x] RN        [x] Family at bedside       [] Other:    -If unable to interview patient: [x] Trach/Vent/BiPAP  [] Disoriented/confused/inappropriate to interview    Diet Order:   Diet, NPO with Tube Feed:   Tube Feeding Modality: Orogastric  Jevity 1.5 Tyson (JEVITY1.5RTH)  Total Volume for 24 Hours (mL): 1200  Continuous  Starting Tube Feed Rate {mL per Hour}: 10  Increase Tube Feed Rate by (mL): 10     Every 4 hours  Until Goal Tube Feed Rate (mL per Hour): 50  Tube Feed Duration (in Hours): 24  Tube Feed Start Time: 17:00 (22)    - Is current order appropriate/adequate? [] Yes  [x]  No: See recommendations below.     - Current EN regimen provides: 1200ml total volume, 1800kcal, 77g protein, 912ml free water.    - EN started overnight, currently infusing at goal rate of 50ml/hr.     - Nutrition-related concerns:   - Pt's son at bedside, states prior to intubation and change in mental status pt eating well, consuming 90% of meals son was present for. Per flow sheets since previous RD follow up () pt with documented intake <50% of meals/supplements.    - Pt remains on Prednisone s/p DDRT. Sliding scale insulin ordered for glycemic management.   - IV Levothyroxine noted    GI:  Last BM 6/27 x 1.   Bowel Regimen? [x] Yes - Miralax, senna syrup    Weights:   Daily Weight in k.2 (), Weight in k.6 (), Weight in k.9 (), Weight in k.9 (), Weight in k.8 (), Weight in k.8 ()  weight fluctuations noted, pt with edema - will continue to monitor     Nutritionally Pertinent MEDICATIONS  (STANDING):  amLODIPine   Tablet  atorvastatin  fluconAZOLE IVPB  ganciclovir IVPB  insulin lispro (ADMELOG) corrective regimen sliding scale  levothyroxine Injectable  meropenem  IVPB  pantoprazole  Injectable  polyethylene glycol 3350  predniSONE  Solution  senna Syrup  trimethoprim  40 mG/sulfamethoxazole 200 mG Suspension  vancomycin  IVPB    Pertinent Labs:  @ 02:00: Na 141, BUN 36<H>, Cr 1.64<H>, BG 82, K+ 3.7, Phos 3.6, Mg 1.9, Alk Phos 82, ALT/SGPT 6<L>, AST/SGOT 13, HbA1c --   @ 18:14: Na 143, BUN 42<H>, Cr 1.74<H>, BG 77, K+ 3.9, Phos 3.8, Mg 2.0, Alk Phos 81, ALT/SGPT 9<L>, AST/SGOT 13, HbA1c --    A1C with Estimated Average Glucose Result: 6.6 % (22 @ 17:12)  A1C with Estimated Average Glucose Result: 7.3 % (22 @ 13:42)  A1C with Estimated Average Glucose Result: 7.2 % (22 @ 05:48)    Finger Sticks:  POCT Blood Glucose.: 154 mg/dL ( @ 11:37)  POCT Blood Glucose.: 124 mg/dL ( @ 05:11)  POCT Blood Glucose.: 85 mg/dL ( @ 01:54)  POCT Blood Glucose.: 84 mg/dL ( @ 23:05)  POCT Blood Glucose.: 104 mg/dL ( @ 20:16)  POCT Blood Glucose.: 68 mg/dL ( @ 19:42)  POCT Blood Glucose.: 106 mg/dL ( @ 15:08)    Skin per nursing documentation: DTI dave. buttocks, stage II sacrum  Edema: 2+ bilateral arm, wrist, hand    Estimated Needs: based on dosing wt 61.7kg  [] no change since previous assessment  [x] recalculated: with consideration for intubation, malnutrition, BMI 19.5, pressure injuries  Estimated Energy Needs: 1543 - 1851kcal (25-30kcal/kg)  Estimated Protein Needs: 74 - 99g (1.2-1.6g/kg)  [place]: 1414kcal ()    Previous Nutrition Diagnosis: Severe Malnutrition, Increased Nutrient Needs  Nutrition Diagnosis is: [x] ongoing  [] resolved [] not applicable     New Nutrition Diagnosis: [x] Not applicable    Nutrition Care Plan:  [x] In Progress -  being addressed with EN while pt intubated  [] Achieved  [] Not applicable    Nutrition Interventions:     Education Provided:       [] Yes:  [x] No: Not appropriate at this time, pt's family made aware RD remains available.     Recommendations:      1. Given hx of T2DM s/p DDRT on Prednisone, recommend change in EN regimen to Glucerna 1.5 at goal rate 50ml/hr x 24hr to provide 1200ml total volume, 1800kcal, 99g protein, 911ml free water. Regimen to meet 29kcal/kg, 1.6g/kg protein based on dosing weight 61.7kg. Defer additional free water flushes to team.  2. Consider multivitamin, vitamin C supplementation to support wound healing if medically feasible.  3. Monitor GI tolerance to feeds, RD remains available to adjust TF regimen/formulary as needed/upon request.     Monitoring and Evaluation:   Continue to monitor nutritional intake, tolerance to diet prescription, weights, labs, skin integrity    RD remains available upon request and will follow up per protocol    Yamile Viveros MS, RD, CDN, [facility] #[phone]

## 2022-06-27 NOTE — PROGRESS NOTE ADULT - NUTRITIONAL ASSESSMENT
This patient has been assessed with a concern for Malnutrition and has been determined to have a diagnosis/diagnoses of Severe protein-calorie malnutrition.    This patient is being managed with:   Diet NPO with Tube Feed-  Tube Feeding Modality: Orogastric  Jevity 1.5 Tyson (JEVITY1.5RTH)  Total Volume for 24 Hours (mL): 1200  Continuous  Starting Tube Feed Rate {mL per Hour}: 10  Increase Tube Feed Rate by (mL): 10     Every 4 hours  Until Goal Tube Feed Rate (mL per Hour): 50  Tube Feed Duration (in Hours): 24  Tube Feed Start Time: 17:00  Entered: Jun 26 2022  4:50PM

## 2022-06-27 NOTE — PROGRESS NOTE ADULT - SUBJECTIVE AND OBJECTIVE BOX
[facility] DIVISION OF KIDNEY DISEASES AND HYPERTENSION -- FOLLOW UP NOTE  --------------------------------------------------------------------------------  HPI: 66 yr old man with ESRD due to DM was on HD since 2019, s/p DCD DDRT on 4/21/22. Donor was 58 , KDPI 82%, DCD, single vessels and ureter, HLA mismatch 1, 2, 2. No DSA, cPRA 0%. 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 Readmitted in May for anemia in the setting of large perinephric hematoma s/p evacuation and repair of arterial anastomosis on 5/2/22. Intra operative biopsy showed no rejection, Creatinine ranging 2mg/dL. He was recently dx with klebsiella UTI rx with ertapenem - then switched to Levaquin day #6. He also had parainfluenza pneumonia. Was at rehab progressing well. Presented with status epilepticus. Intubated for respiratory protection. Right pleural effusion drained 1300ml. Transfused 1 unit PRBC. Pt. transferred to SICU and was transfused 4 additional units for a total of 6 units. Pt. re-transferred to ICU for NCSE. Intubated for airway protection on 6/25.    Pt. seen this AM. Pt. intubated and sedated in the ICU.       PAST HISTORY  --------------------------------------------------------------------------------  No significant changes to PMH, PSH, FHx, SHx, unless otherwise noted    ALLERGIES & MEDICATIONS  --------------------------------------------------------------------------------  Allergies    No Known Allergies    Intolerances      Standing Inpatient Medications  amLODIPine   Tablet 10 milliGRAM(s) Oral daily  atorvastatin 40 milliGRAM(s) Oral at bedtime  chlorhexidine 0.12% Liquid 15 milliLiter(s) Oral Mucosa every 12 hours  chlorhexidine 2% Cloths 1 Application(s) Topical <User Schedule>  epoetin cortney-epbx (RETACRIT) Injectable 38135 Unit(s) SubCutaneous every 7 days  fluconAZOLE IVPB 200 milliGRAM(s) IV Intermittent every 24 hours  fosphenytoin IVPB 100 milliGRAM(s) PE IV Intermittent every 8 hours  ganciclovir IVPB 155 milliGRAM(s) IV Intermittent once  heparin  Infusion 800 Unit(s)/Hr IV Continuous <Continuous>  insulin lispro (ADMELOG) corrective regimen sliding scale   SubCutaneous every 6 hours  levothyroxine Injectable 40 MICROGram(s) IV Push at bedtime  meropenem  IVPB 1000 milliGRAM(s) IV Intermittent every 12 hours  pantoprazole  Injectable 40 milliGRAM(s) IV Push daily  perampanel 4 milliGRAM(s) Oral at bedtime  polyethylene glycol 3350 17 Gram(s) Oral daily  predniSONE  Solution 5 milliGRAM(s) Oral daily  senna Syrup 10 milliLiter(s) Oral daily  trimethoprim  40 mG/sulfamethoxazole 200 mG Suspension 80 milliGRAM(s) Oral <User Schedule>  valproate sodium  IVPB 500 milliGRAM(s) IV Intermittent every 8 hours  vancomycin  IVPB 750 milliGRAM(s) IV Intermittent once    PRN Inpatient Medications  HYDROmorphone  Injectable 0.5 milliGRAM(s) IV Push every 3 hours PRN      REVIEW OF SYSTEMS  --------------------------------------------------------------------------------  Unable to obtain    VITALS/PHYSICAL EXAM  --------------------------------------------------------------------------------  T(C): 36.9 (06-27-22 @ 11:00), Max: 37.2 (06-26-22 @ 23:00)  HR: 57 (06-27-22 @ 11:00) (43 - 62)  BP: 154/68 (06-26-22 @ 19:00) (130/55 - 166/78)  RR: 16 (06-27-22 @ 11:00) (16 - 26)  SpO2: 100% (06-27-22 @ 11:00) (98% - 100%)  Wt(kg): --        06-26-22 @ 07:01  -  06-27-22 @ 07:00  --------------------------------------------------------  IN: 2337.5 mL / OUT: 2290 mL / NET: 47.5 mL    06-27-22 @ 07:01  -  06-27-22 @ 12:35  --------------------------------------------------------  IN: 359.5 mL / OUT: 200 mL / NET: 159.5 mL      Physical Exam:  	Gen: Intubated and sedated   	HEENT: MMM   	Pulm: CTAB anteriorly  	CV: S1S2+  	Abd: +BS, soft, non-tender          Transplant: No tenderness, swelling  	: No suprapubic tenderness, Miranda+  	MSK: no LE edema, some RUE edema.   	Psych: Sedated  	Skin: Warm          Access: Peripheral       LABS/STUDIES  --------------------------------------------------------------------------------              8.7    3.89  >-----------<  425      [06-27-22 @ 04:13]              27.8     141  |  108  |  36  ----------------------------<  82      [06-27-22 @ 02:00]  3.7   |  24  |  1.64        Ca     8.3     [06-27-22 @ 02:00]      Mg     1.9     [06-27-22 @ 02:00]      Phos  3.6     [06-27-22 @ 02:00]    TPro  5.4  /  Alb  2.2  /  TBili  0.2  /  DBili  x   /  AST  13  /  ALT  6   /  AlkPhos  82  [06-27-22 @ 02:00]    PT/INR: PT 17.5 , INR 1.50       [06-26-22 @ 05:44]  PTT: 52.4       [06-27-22 @ 10:07]    CK 17      [06-26-22 @ 18:14]    Creatinine Trend:  SCr 1.64 [06-27 @ 02:00]  SCr 1.74 [06-26 @ 18:14]  SCr 1.89 [06-26 @ 05:43]  SCr 2.11 [06-25 @ 06:48]  SCr 1.96 [06-24 @ 06:24]    Tacrolimus (), Serum: 4.6 ng/mL (06-21 @ 06:16)    Urinalysis - [06-26-22 @ 06:25]      Color Light Yellow / Appearance Clear / SG 1.015 / pH 7.0      Gluc Negative / Ketone Negative  / Bili Negative / Urobili Negative       Blood Negative / Protein Trace / Leuk Est Negative / Nitrite Negative      RBC 3 / WBC 1 / Hyaline 0 / Gran  / Sq Epi  / Non Sq Epi 1 / Bacteria Negative    Urine Creatinine 52      [06-25-22 @ 14:10]  Urine Protein 24      [06-25-22 @ 14:10]  Urine Sodium 68      [06-25-22 @ 14:10]  Urine Osmolality 375      [06-25-22 @ 14:10]    Iron 17, TIBC 171, %sat 10      [05-02-22 @ 13:03]  Ferritin 1505      [05-02-22 @ 13:05]  PTH -- (Ca 9.2)      [02-05-22 @ 10:13]   294  HbA1c 6.0      [02-21-20 @ 08:53]

## 2022-06-27 NOTE — PROGRESS NOTE ADULT - ASSESSMENT
66 year old Male with PMHx ESRD s/p permacath removal 5/10/22 s/p Rt DDRT 4/21/22,  CVA (2019), Afib on apixaban, seizure activity, CAD s/p stents, CABG (2020), HTN, HLD, DM on insulin, gastric/duodenal ulcer, recent hospitalization 4/28/22 -5/10/22 with weakness/anemia found to have perinephric hematoma requiring evacuation and repair of bleeding arterial anastomosis who presented to [facility] for status epilepticus requiring intubation for hypoxic respiratory failure.    At [facility] was intubated on 6/10  On 6/11 underwent thoracentesis in context of Eliquis therapy  Developed bleeding into pleural space and require Chest tube placement  Planned for VATS    COVID19/FLU/RSV PCR (6/10) Negative  U/A (6/10) with 5 WBC and 25 RBC  Blood Cultures (6/10) NGTD  Pleural Fluid with 71 nucleated cells, 50% PMN, 31% Lymph.   Pleural Fluid Culture (6/11) Negative   CT Chest (6/12) with Right-sided chest tube with large hemothorax and atelectasis of the right lower lobe.    On 6/15 s/p uniportal R VATS, evacuation of hemothorax, pneumonolysis, decortication, intercostal nerve block.    CXR (6/21) with Rounded opacity in right midlung concerning for loculated pleural fluid or pneumonia  No leukocytosis, fevers or other clinical status changes  No obvious s/s of infection at present  Could represent residual hemothorax    Intermittent lethargic  MRI Brain (6/20) worrisome for PRES    #Hypothermia, Encephalopathy/Seizures  Lower suspicion for encephalitis/meningitis  Reasonable to pursue LP to exclude CNS infection however.   --Would start Ganciclovir 2.5 mg/kg IV Q24H (can do one time dose today)  --Would continue to cover empirically with Vancomycin and Meropenem  --If proceeding with LP would send for cell counts with differential, protein, glucose, CSF PCR Panel, HSV 1/2 PCR, CMV PCR, ERROL Virus PCR, WNV IgM/IgG, Bacterial, Fungal, AFB Cultures   --Follow up on PCR for EBV, HHV6, Parvovirus, Adenovirus  --Follow up on prelim blood cultures  --Follow up on brain MRI    #Residual Pleural Effusion  Thoracic Surgery input noted; no plans for further intervention  --Consider ultrasound of right effusion versus CT Chest noncontrast    #Positive Urine Culture (?UTI)  Of note, was being treated for UTI at rehab  UCx from 6/1 with >100K ESBL Klebsiella (Cipro R but Levofloxacin S)  He received 1-2 doses of Ertapenem and was then switched to Levofloxacin  Doubt the Levofloxacin was effective therapy given Cipro resistance  s/p Ertapenem 6/14 --> 6/18    #Renal Transplant Recipient, Prophylactic Antibiotic  --Continue Bactrim for PCP PPx  --Continue Valcyte for CMV PPx    I will continue to follow. Please feel free to contact me with any further questions.    Carlton Hoover M.D.  [facility] Division of Infectious Disease  8AM-5PM Monday - Friday: Available on [employer] Teams  After Hours and Holidays (or if no response on [employer] Teams): Please contact the Infectious Diseases Office at [phone]    The above assessment and plan were discussed with transplant surgery team

## 2022-06-27 NOTE — PROGRESS NOTE ADULT - PROBLEM SELECTOR PLAN 2
S/p Thymo induction. Stopped CNI as MRI concerning for PRES and increased seizure activity. C/w Belatacept, for 2nd dose today. C/w prednisone 5mg daily. MMF held for leukopenia. C/w Valcyte. C/w Fluconazole. C/w Nystatin. C/w bactrim. Glucose now better controlled. F/u HHSV 6, Toxoplasmosis, Nocardia and Cryptosporidium.     If you have any questions, please feel free to contact me  Ant Pantoja  Nephrology Fellow  [phone]; Prefer [employer] TEAMS  (After 5pm or on weekends please page the on-call fellow).

## 2022-06-28 NOTE — PROGRESS NOTE ADULT - PROBLEM SELECTOR PLAN 3
Telephone Encounter by Tanya Rojas at 07/11/18 04:08 PM     Author:  Tanya Rojas Service:  (none) Author Type:       Filed:  07/11/18 04:08 PM Encounter Date:  7/7/2018 Status:  Signed     :  Tanya Rojas ()            Patient returning call, scheduled 7/18 with Dr. Sanford.[FH1.1M]      Revision History        User Key Date/Time User Provider Type Action    > FH1.1 07/11/18 04:08 PM Tanya Rojas  Sign    M - Manual             -C/w levothyroxine Injectable 40 MICROGram(s) IV Push at bedtime  -Once taking POs can restart LT4 50 mcg daily on an empty stomach at least 1 hour apart from from food or PO meds  - monitor TFTs outpatient.

## 2022-06-28 NOTE — PROGRESS NOTE ADULT - ASSESSMENT
Epilepsy, intractable, with status epilepticus  Encephalopathy  S/p renal transplant, ANA  Prior strokes  Atrial fibrillation  CAD  HTN  DM type 2    - Patient with continued non-convulsive electrographic seizures, refractory to current therapy. Mental status also likely worsening. He is hypothermic with lower WBC. Although CNS infection should be on the differential, especially given immunocompromised state with renal transplant, given low temperature and low WBC would favor more systemic process as opposed to primary CNS. Appreciate ID input and currently recommending antibiotic therapy with meropenem  - vEEG without seizures, including LLE movements without EEG correlate. STOP  - VPA STOP. Please check new phenytoin trough (30-60 minutes before dose) on 6/30 AM  - Continue Fycompa at 4mg PO bedtime  - Continue fosphenytoin maintenance with 100mg [id number]  - Vimpat stopped. Due to sedation also stopped standing Ativan  - Agree with holding Eliquis for now given possibly need for LP in the next few days (recommend at least 3 days, preferably 5 days if not emergent need). Earliest would be Tuesday (6/28) but preferably Thursday (6/30). However, from our standpoint this will likely not be needed due to improving clinical exam and no signs of CNS infection on repeat head imaging  - Repeat MRI brain w/ and w/o without clear evidence of infection, inflammation, or acute CNS even. 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

## 2022-06-28 NOTE — PROVIDER CONTACT NOTE (OTHER) - ASSESSMENT
Pt mechanically ventilated, VSS. Pt moving all extremities L>R sicu team aware, pupils equal/brisk, afebrile. Extremities warm, appearing well perfused, pulses intact

## 2022-06-28 NOTE — PROGRESS NOTE ADULT - SUBJECTIVE AND OBJECTIVE BOX
NEUROLOGY FOLLOW-UP CONSULT NOTE    RFC: Seizures    Interval history: Patient with resolved electrographic non-convulsive seizures over past 48 hours. Noted LLE movements without EEG correlate. vEEG connected at bedside. He remainss intubated for airway protection. No additional acute neurologic events overnight.    Meds:  MEDICATIONS  (STANDING):  amLODIPine   Tablet 10 milliGRAM(s) Oral <User Schedule>  atorvastatin 40 milliGRAM(s) Oral at bedtime  chlorhexidine 2% Cloths 1 Application(s) Topical <User Schedule>  epoetin cortney-epbx (RETACRIT) Injectable 54508 Unit(s) SubCutaneous every 7 days  fluconAZOLE IVPB 200 milliGRAM(s) IV Intermittent every 24 hours  fosphenytoin IVPB 100 milliGRAM(s) PE IV Intermittent every 8 hours  ganciclovir IVPB 155 milliGRAM(s) IV Intermittent once  heparin  Infusion 800 Unit(s)/Hr (12 mL/Hr) IV Continuous <Continuous>  hydrALAZINE 25 milliGRAM(s) Oral every 8 hours  insulin lispro (ADMELOG) corrective regimen sliding scale   SubCutaneous every 4 hours  levothyroxine Injectable 40 MICROGram(s) IV Push at bedtime  meropenem  IVPB 1000 milliGRAM(s) IV Intermittent every 12 hours  pantoprazole  Injectable 40 milliGRAM(s) IV Push daily  perampanel 4 milliGRAM(s) Oral at bedtime  polyethylene glycol 3350 17 Gram(s) Oral daily  predniSONE  Solution 5 milliGRAM(s) Oral daily  senna Syrup 10 milliLiter(s) Oral daily  trimethoprim  40 mG/sulfamethoxazole 200 mG Suspension 80 milliGRAM(s) Oral <User Schedule>    MEDICATIONS  (PRN):  HYDROmorphone  Injectable 0.5 milliGRAM(s) IV Push every 3 hours PRN Moderate Pain (4 - 6)    GTT:  Heparin 1200 Units/hour    PMHx/PSHx/FHx/SHx:  Acute respiratory failure with hypoxia    No pertinent family history in first degree relatives    No pertinent family history in first degree relatives    No pertinent family history in first degree relatives    FH: HTN (hypertension)    No pertinent family history in first degree relatives    Family history of cancer of tongue (Father)    Handoff    MEWS Score    Hypertension    Diabetes    Dyslipidemia    CAD (Coronary Artery Disease)    CAD (Coronary Artery Disease)    CRI (Chronic Renal Insufficiency)    Hypothyroidism    CVA (cerebral vascular accident)    Anemia    PEG (percutaneous endoscopic gastrostomy) status    Intubation of airway performed without difficulty    ESRD on dialysis    Seizures    Acute respiratory failure with hypoxia    Renal transplant recipient    Immunosuppressive management encounter following kidney transplant    Hemothorax    Hypothyroidism    Type 2 diabetes mellitus with hypoglycemia, with long-term current use of insulin    HTN (hypertension)    HLD (hyperlipidemia)    No significant past surgical history    History of insertion of stent into coronary artery bypass graft    S/P CABG x 3    SEIZURE    30    Convulsive status epilepticus    [email or web address]        Allergies:  No Known Allergies      ROS: Due to clinical condition unable to assess (PERRY)    O:  T(C): 36.8 (06-28-22 @ 11:00), Max: 37.4 (06-27-22 @ 20:00)  HR: 63 (06-28-22 @ 15:00) (53 - 71)  BP: 142/65 (06-27-22 @ 19:00) (142/65 - 142/65)  RR: 20 (06-28-22 @ 15:00) (0 - 21)  SpO2: 99% (06-28-22 @ 15:00) (99% - 100%)    Focused neurologic exam:  MS - Intubated, not sedated, Lethargic, attends to all stimuli b/l, no speech output, does not follow commands. PERRY orientation, rep/naming, attn/conc, recent and remote memory, fund of knowledge  CN - PERRL, EOMI by OCR, (+) face sens/str by corneals b/l, (+) spontaneous respirations (vent rate 16 bpm, patient rate 19 bpm), (+) cough, VFF to threat b/l, hearing intact to conversation b/l, SCM at least 4+/5 b/l and symmetric. PERRY tongue/palate  Motor - Normal bulk. Inc tone in L side (paratonic?) and normal tone in R side. Spontaneous movements of L > R side. Grimaces, withdraws, and localizes to strong noxious in LUE 4+/5, LLE 4+/5, RUE 3+/5, RLE 2/5  Sens - LT/temp intact all, as above  DTR's - 2+ BUE's, 3+ R KJ, 2+ L KJ, 1+ AJ b/l, and upgoing R and downgoing L plantar response  Coord - PERRY  Gait and station - PERRY    Pertinent labs/studies:  PT/INR inc 16.3/1.4, PTT inc 58.8  CBC with low H/H 9/27 and MCV WNL, inc plt 446, otherwise essentially WNL  BMP with inc BUN/Cr 28/1.51 (GFR dec 50), otherwise essentially WNL  Mg WNL, Phos WNL  Albumin dec 2.3, LFT's WNL  NH3 WNL    Phenytoin level (6/27) - 12.5 (corrected 17.1)    vEEG 6/28 -  EEG SUMMARY/CLASSIFICATION    Abnormal EEG in an altered / a sedated patient.  - Rare right frontal and left frontal sharp waves  - Patient events as described above  - Left frontotemporal slowing  - Mild to moderate generalized slowing    _____________________________________________________________  EEG IMPRESSION/CLINICAL CORRELATE    Abnormal EEG study.  Event of left leg shaking was not epileptic.  Potential epileptogenic foci in the right frontal and left frontal regions.  Structural or functional abnormality in the left frontotemporal region.  Mild to moderate nonspecific diffuse or multifocal cerebral dysfunction.   No seizure seen.      MRI brain w/ and w/o 6/27 - Extensive bilateral frontal parietal gliosis and encephalomalacia with minimal enhancement which may be related to ischemic changes versus changes of hypertensive encephalopathy in a patient with renal transplant on immunosuppressants. No evidence of cerebritis or abscess.      < from: CT Head No Cont (06.24.22 @ 10:29) >    ACC: [id number] EXAM:  CT BRAIN                          PROCEDURE DATE:  06/24/2022      INTERPRETATION:  .    CLINICAL INFORMATION: Worsening lethargy. Altered mental status.    TECHNIQUE: Multiple axial CT images of the head were obtained without   contrast. Sagittal and coronal reconstructed images were acquired from   the source data.    COMPARISON: Prior head CT study from 6/21/2022. Prior brain MRI study   from 6/20/2022.    FINDINGS: Extensive encephalomalacia and gliosis are again noted within   the high bilateral cerebral hemispheres.. Overall appearance is unchanged.    There is no acute intracranial hemorrhage, mass effect, shift of the   midline structures, herniation, hydrocephalus, abnormal extra-axial fluid   collection.    There is diffuse cerebral volume loss with prominence of the sulci,   fissures, and cisternal spaces which is normal for the patient's age.   There is moderate patchy confluent periventricular white matter   hypoattenuation statistically compatible with microvascular changes given   calcific atherosclerotic disease of the intracranial arteries.    The paranasal sinuses and mastoid air cells are clear. The calvarium   appears intact. There is evidence of bilateral cataract removal.    IMPRESSION:No acute intracranial hemorrhage.    Similar-appearing extensive encephalomalacia and gliosis within the   bilateral high cerebral hemispheres.    Similar-appearing moderate severity chronic white matter microvascular   type changes.

## 2022-06-28 NOTE — PROGRESS NOTE ADULT - ASSESSMENT
66M with h/o DM type II, HTN, CAD s/p CABG in 2020, Afib on Eliquis, CVA in 2019 due to Afib, Seizure d/o (last episode was on 4/8/22), h/o GI bleed in 2/2022 EGD with duodenal ulcer and ESRD on HD s/p R DDRT from DCD donor on 4/21/22 complicated by DGF requiring HD until 4/29/22 now with good graft function who presented with status epilepticus in setting of UTI/antibiotics and sub-therapeutic valproic acid level     Seizure Disorder  - repeat MRI head 6/27 with less concerns for PRES, ischemic changes  - EEG ongoing: seizure free x48H  - Antiepileptic regimen per Neurology, appreciate recs. please follow closely with team  - Keep Mag > 2  - extubation per SICU  - hypothermia has resolved, Transplant ID following, appreciate recs  - S/p Ganciclovir 2.5mg/kg 6/27 and again today. Will hold Valcyte  - Continue Andres/Fluc/Vanc by level.    - 1 bottle BCx likely contaminant, f/u repeat cultures  - LP expected in the next 24H once safe off Eliquis.   - f/u viral panel    R DCD DDRT (ureter stented) 4/21/22  - renal function stable  - Strict I/Os, tucker/OGT  - TFs to goal  - will remove stent prior to discharge    Immunosuppression:   - Belatacept #1 6/23.  HOLDING further doses (next dose was due 6/27)  - MMF on HOLD  - continue Pred 5  - PPx: see above. off valcyte.  continue Bactrim MWF and PPI    DM  - on Lantus/Lispro, Endocrine following     AFib:  - S/p 1U PRBC 6/24 for anemia   - continue Retacrit weekly  - Eliquis on hold pending possible LP   - rate controlled    R IJ DVT  - on hep gtt, Eliquis on HOLD    HTN:  - continue Nifedipine/Coreg  - Keep SBP < 150    Dispo  - continue excellent SICU care

## 2022-06-28 NOTE — PROGRESS NOTE ADULT - SUBJECTIVE AND OBJECTIVE BOX
[facility] DIVISION OF KIDNEY DISEASES AND HYPERTENSION -- FOLLOW UP NOTE  --------------------------------------------------------------------------------  HPI: 66 yr old man with ESRD due to DM was on HD since 2019, s/p DCD DDRT on 4/21/22. Donor was 58 , KDPI 82%, DCD, single vessels and ureter, HLA mismatch 1, 2, 2. No DSA, cPRA 0%. 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 Readmitted in May for anemia in the setting of large perinephric hematoma s/p evacuation and repair of arterial anastomosis on 5/2/22. Intra operative biopsy showed no rejection, Creatinine ranging 2mg/dL. He was recently dx with klebsiella UTI rx with ertapenem - then switched to Levaquin day #6. He also had parainfluenza pneumonia. Was at rehab progressing well. Presented with status epilepticus. Intubated for respiratory protection. Right pleural effusion drained 1300ml. Transfused 1 unit PRBC. Pt. transferred to SICU and was transfused 4 additional units for a total of 6 units. Pt. re-transferred to ICU for NCSE. Intubated for airway protection on 6/25.    Pt. seen this AM. Pt. intubated and sedated in the ICU. CMV negative. Toxo negative. Pt. noted with have muscle spasms. Discussed with ICU team and with epilepsy team. Potentially for LP today. MRI largely unchanged from prior.       PAST HISTORY  --------------------------------------------------------------------------------  No significant changes to PMH, PSH, FHx, SHx, unless otherwise noted    ALLERGIES & MEDICATIONS  --------------------------------------------------------------------------------  Allergies    No Known Allergies    Intolerances      Standing Inpatient Medications  amLODIPine   Tablet 10 milliGRAM(s) Oral <User Schedule>  atorvastatin 40 milliGRAM(s) Oral at bedtime  chlorhexidine 0.12% Liquid 15 milliLiter(s) Oral Mucosa every 12 hours  chlorhexidine 2% Cloths 1 Application(s) Topical <User Schedule>  epoetin cortney-epbx (RETACRIT) Injectable 03116 Unit(s) SubCutaneous every 7 days  fluconAZOLE IVPB 200 milliGRAM(s) IV Intermittent every 24 hours  fosphenytoin IVPB 100 milliGRAM(s) PE IV Intermittent every 8 hours  heparin  Infusion 800 Unit(s)/Hr IV Continuous <Continuous>  hydrALAZINE 25 milliGRAM(s) Oral every 8 hours  insulin lispro (ADMELOG) corrective regimen sliding scale   SubCutaneous every 4 hours  levothyroxine Injectable 40 MICROGram(s) IV Push at bedtime  meropenem  IVPB 1000 milliGRAM(s) IV Intermittent every 12 hours  pantoprazole  Injectable 40 milliGRAM(s) IV Push daily  perampanel 4 milliGRAM(s) Oral at bedtime  polyethylene glycol 3350 17 Gram(s) Oral daily  predniSONE  Solution 5 milliGRAM(s) Oral daily  senna Syrup 10 milliLiter(s) Oral daily  trimethoprim  40 mG/sulfamethoxazole 200 mG Suspension 80 milliGRAM(s) Oral <User Schedule>    PRN Inpatient Medications  HYDROmorphone  Injectable 0.5 milliGRAM(s) IV Push every 3 hours PRN      REVIEW OF SYSTEMS  --------------------------------------------------------------------------------  Unable to obtain    VITALS/PHYSICAL EXAM  --------------------------------------------------------------------------------  T(C): 37 (06-28-22 @ 07:00), Max: 37.4 (06-27-22 @ 20:00)  HR: 64 (06-28-22 @ 10:00) (53 - 71)  BP: 142/65 (06-27-22 @ 19:00) (142/65 - 142/65)  RR: 11 (06-28-22 @ 10:00) (11 - 20)  SpO2: 100% (06-28-22 @ 10:00) (100% - 100%)  Wt(kg): --        06-27-22 @ 07:01  -  06-28-22 @ 07:00  --------------------------------------------------------  IN: 1931.5 mL / OUT: 1445 mL / NET: 486.5 mL    06-28-22 @ 07:01  -  06-28-22 @ 10:58  --------------------------------------------------------  IN: 326 mL / OUT: 165 mL / NET: 161 mL        Physical Exam:  	Gen: Intubated and sedated   	HEENT: MMM   	Pulm: CTAB anteriorly  	CV: S1S2+  	Abd: +BS, soft, non-tender          Transplant: No tenderness, swelling  	: No suprapubic tenderness, Miranda+  	MSK: no LE edema, some RUE edema.   	Psych: Sedated  	Skin: Warm          Access: Peripheral       LABS/STUDIES  --------------------------------------------------------------------------------              8.5    5.18  >-----------<  446      [06-28-22 @ 04:10]              27.1     141  |  108  |  28  ----------------------------<  143      [06-28-22 @ 04:10]  4.9   |  25  |  1.51        Ca     8.3     [06-28-22 @ 04:10]      Mg     2.2     [06-28-22 @ 04:10]      Phos  3.0     [06-28-22 @ 04:10]    TPro  5.7  /  Alb  2.3  /  TBili  0.2  /  DBili  x   /  AST  14  /  ALT  9   /  AlkPhos  93  [06-28-22 @ 04:10]    PT/INR: PT 16.3 , INR 1.40       [06-28-22 @ 04:10]  PTT: 73.9       [06-28-22 @ 10:27]    CK 17      [06-26-22 @ 18:14]    Creatinine Trend:  SCr 1.51 [06-28 @ 04:10]  SCr 1.64 [06-27 @ 02:00]  SCr 1.74 [06-26 @ 18:14]  SCr 1.89 [06-26 @ 05:43]  SCr 2.11 [06-25 @ 06:48]    Urinalysis - [06-26-22 @ 06:25]      Color Light Yellow / Appearance Clear / SG 1.015 / pH 7.0      Gluc Negative / Ketone Negative  / Bili Negative / Urobili Negative       Blood Negative / Protein Trace / Leuk Est Negative / Nitrite Negative      RBC 3 / WBC 1 / Hyaline 0 / Gran  / Sq Epi  / Non Sq Epi 1 / Bacteria Negative    Urine Creatinine 52      [06-25-22 @ 14:10]  Urine Protein 24      [06-25-22 @ 14:10]  Urine Sodium 68      [06-25-22 @ 14:10]  Urine Osmolality 375      [06-25-22 @ 14:10]    Iron 17, TIBC 171, %sat 10      [05-02-22 @ 13:03]  Ferritin 1505      [05-02-22 @ 13:05]  PTH -- (Ca 9.2)      [02-05-22 @ 10:13]   294  HbA1c 6.0      [02-21-20 @ 08:53]      Syphilis Screen (Treponema Pallidum Ab) Negative      [06-27-22 @ 02:00]

## 2022-06-28 NOTE — PROGRESS NOTE ADULT - ASSESSMENT
66 yr old Male with PMHx ESRD s/p permacath removal 5/10/22 s/p Rt DDRT 4/21/22,  CVA (2019), Afib on apixaban, seizure activity, CAD s/p stents, CABG (2020), HTN, HLD, DM on insulin, gastric/duodenal ulcer, recent hospitalization 4/28/22 -5/10/22 with weakness/anemia found to have perinephric hematoma requiring evacuation and repair of bleeding arterial anastomosis. Curently being treated for UTI with Levaquin Today after developing multiple sz episodes refractory to 5 mg versed IM (EMS) and 2 mg ativan IV in E.D. concerning for status epilepticus requiring intubation for hypoxic respiratory  failure. MICU Consult was called for hypoxic respiratory failure secondary to status epilepticus.  Nephrology consulted for renal failure.     A/P  DDRT on 04/21/22  Course complicated by DGF, was on HD until 4/29/22. Readmitted in May for anemia in the setting of large perinephric hematoma s/p evacuation and repair of arterial anastomosis on 5/2/22. Intra operative biopsy showed no rejection.  ANA likely sec to  hemodynamic change   scr improving today   optimize glucose   monitor BMP, U/O     HTN   optimal   monitor BP closely     Immunosuppresssion  continue per transplant team

## 2022-06-28 NOTE — PROGRESS NOTE ADULT - NUTRITIONAL ASSESSMENT
Diet, NPO with Tube Feed:   Tube Feeding Modality: Orogastric  Glucerna 1.5 Tyson (GLUCERNA1.5RTH)  Total Volume for 24 Hours (mL): 1200  Continuous  Starting Tube Feed Rate {mL per Hour}: 50  Until Goal Tube Feed Rate (mL per Hour): 50  Tube Feed Duration (in Hours): 24  Tube Feed Start Time: 17:00 (06-27-22 @ 15:20) [Active]      Please see RD assessment and/or follow up.  Managed by primary team as well

## 2022-06-28 NOTE — AIRWAY REMOVAL NOTE  ADULT & PEDS - ARTIFICAL AIRWAY REMOVAL COMMENTS
Written order for extubation verified. The patient was identified by full name and birth date compared to the identification band. Present during the procedure was Meena
Written order for extubation verified. The patient was identified by full name and birth date compared to the identification band. Present during the procedure was TAO Dewitt.
written order for extubation verified. The patient was identified by full name and birth date compared to the identification band.  Present during the procedure was TAO Francisco

## 2022-06-28 NOTE — PROGRESS NOTE ADULT - SUBJECTIVE AND OBJECTIVE BOX
HISTORY  Patient is a 65 y/o male w/ a PMHx of CAD s/p CABG (2020), AF on Eliquis c/b CVA (2019) c/b seizures, HTN, HLD, DM type II, hypothyroidism, GI bleed due to a duodenal ulcer (2/2022), and ESRD s/p DDRT (4/21/22) c/b DGF requiring HD (last session 4/29/22) & large perinephric hematoma s/p evacuation w/ revision of arterial anastomosis (5/2/22) who presented on 6/10 for status epilepticus. Patient was intubated for airway protection and transferred to MICU for further management. Patient was loaded w/ valproic acid and continued his home Keppra w/ resolution of his seizures on EEG and patient was extubated on 6/11. Patient was noted to have a large right pleural effusion, thoracentesis was performed w/ 1.3 L of serosanguineous fluid . Patient subsequenlty started on a heparin infusion for his atrial fibrillation post-procedure but had a H&H drop w/ CXR demonstrating complete right lung whiteout concerning for a hemothorax. Patient was transferred to SICU for further management. He was intubated for airway protection again, transfused with blood products, and reversed w/ protamine. A right 28 Fr chest tube was placed w/ a significant amount of sanguineous fluid drained. 6/15 Patient went to OR with thoracic for Right VATS and second chest tube placement. Patient recovered well and was transferred to the floor 6/17. Patient was having 10-11 seizures per hour, transferred to SICU for monitoring. . On 6/26, pt became less responsive with worsening lethargy and was subsequently intubated for airway protection. On 6/27, pt became hypothermic with low WBC and was started on empiric abx.     24 HOUR EVENTS:  - MRI head demonstrates Extensive bilateral frontal parietal gliosis and   encephalomalacia with minimal enhancement which may be related to   ischemic changes versus changes of hypertensive encephalopathy.  No acute events during day.    SUBJECTIVE/ROS:  [ ] A ten-point review of systems was otherwise negative except as noted.  [ ] Due to altered mental status/intubation, subjective information were not able to be obtained from the patient. History was obtained, to the extent possible, from review of the chart and collateral sources of information.      NEURO  RASS:  -2    Exam: opens eyes to voice, intermittently follows commands, withdraws from pain in all 4 extremities   Meds: fosphenytoin IVPB 100 milliGRAM(s) PE IV Intermittent every 8 hours  HYDROmorphone  Injectable 0.5 milliGRAM(s) IV Push every 3 hours PRN Moderate Pain (4 - 6)  perampanel 4 milliGRAM(s) Oral at bedtime  valproate sodium  IVPB 500 milliGRAM(s) IV Intermittent every 8 hours    [x] Adequacy of sedation and pain control has been assessed and adjusted      RESPIRATORY  RR: 17 (06-28-22 @ 00:00) (11 - 26)  SpO2: 100% (06-28-22 @ 00:00) (100% - 100%)  Wt(kg): --  Exam: unlabored, clear to auscultation bilaterally  Mechanical Ventilation: Mode: AC/ CMV (Assist Control/ Continuous Mandatory Ventilation), RR (machine): 16, RR (patient): 20, TV (machine): 450, FiO2: 30, PEEP: 5, ITime: 0.9, MAP: 10, PIP: 28  ABG - ( 27 Jun 2022 16:47 )  pH: 7.40  /  pCO2: 43    /  pO2: 191   / HCO3: 27    / Base Excess: 1.6   /  SaO2: 99.0    Lactate: x        Meds:       CARDIOVASCULAR  HR: 58 (06-28-22 @ 00:00) (50 - 62)  BP: 142/65 (06-27-22 @ 19:00) (142/65 - 142/65)  BP(mean): 93 (06-27-22 @ 19:00) (93 - 93)  ABP: 159/50 (06-28-22 @ 00:00) (136/40 - 176/59)  ABP(mean): 88 (06-28-22 @ 00:00) (72 - 101)  Wt(kg): --  CVP(cm H2O): --      Exam: regular rate and rhythm  Cardiac Rhythm: sinus  Perfusion     [x]Adequate   [ ]Inadequate  Mentation   [x]Normal       [ ]Reduced  Extremities  [x]Warm         [ ]Cool  Volume Status [ ]Hypervolemic [x]Euvolemic [ ]Hypovolemic  Meds: amLODIPine   Tablet 10 milliGRAM(s) Oral daily  hydrALAZINE 10 milliGRAM(s) Oral every 8 hours        GI/NUTRITION  Exam: soft, nontender, nondistended, incision C/D/I  Diet:  Meds: pantoprazole  Injectable 40 milliGRAM(s) IV Push daily  polyethylene glycol 3350 17 Gram(s) Oral daily  senna Syrup 10 milliLiter(s) Oral daily      GENITOURINARY  I&O's Detail    06-26 @ 07:01 - 06-27 @ 07:00  --------------------------------------------------------  IN:    Heparin: 147.5 mL    IV PiggyBack: 600 mL    IV PiggyBack: 250 mL    Jevity 1.5: 270 mL    Lactated Ringers: 1050 mL    Propofol: 20 mL  Total IN: 2337.5 mL    OUT:    FentaNYL: 0 mL    Indwelling Catheter - Urethral (mL): 2265 mL    Nasogastric/Oral tube (mL): 25 mL  Total OUT: 2290 mL    Total NET: 47.5 mL      06-27 @ 07:01 - 06-28 @ 01:09  --------------------------------------------------------  IN:    Enteral Tube Flush: 150 mL    Heparin: 199.5 mL    IV PiggyBack: 200 mL    IV PiggyBack: 250 mL    Jevity 1.5: 710 mL  Total IN: 1509.5 mL    OUT:    Indwelling Catheter - Urethral (mL): 1060 mL  Total OUT: 1060 mL    Total NET: 449.5 mL          06-27    141  |  108  |  36<H>  ----------------------------<  82  3.7   |  24  |  1.64<H>    Ca    8.3<L>      27 Jun 2022 02:00  Phos  3.6     06-27  Mg     1.9     06-27    TPro  5.4<L>  /  Alb  2.2<L>  /  TBili  0.2  /  DBili  x   /  AST  13  /  ALT  6<L>  /  AlkPhos  82  06-27    [x] Miranda catheter, indication: strict I/Os  Meds:       HEMATOLOGIC  Meds: heparin  Infusion 800 Unit(s)/Hr IV Continuous <Continuous>    [x] VTE Prophylaxis                        8.0    4.30  )-----------( 406      ( 27 Jun 2022 22:12 )             26.2     PT/INR - ( 26 Jun 2022 05:44 )   PT: 17.5 sec;   INR: 1.50 ratio         PTT - ( 27 Jun 2022 22:12 )  PTT:73.2 sec  Transfusion     [ ] PRBC   [ ] Platelets   [ ] FFP   [ ] Cryoprecipitate      INFECTIOUS DISEASES  WBC Count: 4.30 K/uL (06-27 @ 22:12)  WBC Count: 3.89 K/uL (06-27 @ 04:13)    RECENT CULTURES:  Specimen Source: Combi-Cath Combi-Cath  Date/Time: 06-26 @ 18:08  Culture Results: --  Gram Stain:   Few polymorphonuclear leukocytes per low power field  Rare Squamous Epithelial Cells per low power field  Rare Gram Positive Rods per oil power field  Organism: --  Specimen Source: .Blood Blood-Peripheral  Date/Time: 06-26 @ 09:01  Culture Results:   No growth to date.  Gram Stain: --  Organism: --  Specimen Source: .Blood Blood-Peripheral  Date/Time: 06-26 @ 08:14  Culture Results:   Growth in anaerobic bottle: Gram positive cocci in pairs  ***Blood Panel PCR results on this specimen are available  approximately 3 hours after the Gram stain result.***  Gram stain, PCR, and/or culture results may not always  correspond due to difference in methodologies.  ************************************************************  This PCR assay was performed by multiplex PCR. This  Assay tests for 66 bacterial and resistance gene targets.  Please refer to the [facility] test directory  at [email or web address] for details.  Gram Stain:   Growth in anaerobic bottle: Gram positive cocci in pairs  Organism: Blood Culture PCR  Specimen Source: Catheterized Catheterized  Date/Time: 06-26 @ 06:25  Culture Results:   No growth  Gram Stain: --  Organism: --    Meds: epoetin cortney-epbx (RETACRIT) Injectable 23703 Unit(s) SubCutaneous every 7 days  fluconAZOLE IVPB 200 milliGRAM(s) IV Intermittent every 24 hours  meropenem  IVPB 1000 milliGRAM(s) IV Intermittent every 12 hours  trimethoprim  40 mG/sulfamethoxazole 200 mG Suspension 80 milliGRAM(s) Oral <User Schedule>        ENDOCRINE  CAPILLARY BLOOD GLUCOSE      POCT Blood Glucose.: 263 mg/dL (27 Jun 2022 22:05)  POCT Blood Glucose.: 237 mg/dL (27 Jun 2022 17:06)  POCT Blood Glucose.: 154 mg/dL (27 Jun 2022 11:37)  POCT Blood Glucose.: 124 mg/dL (27 Jun 2022 05:11)  POCT Blood Glucose.: 85 mg/dL (27 Jun 2022 01:54)    Meds: atorvastatin 40 milliGRAM(s) Oral at bedtime  insulin lispro (ADMELOG) corrective regimen sliding scale   SubCutaneous every 4 hours  levothyroxine Injectable 40 MICROGram(s) IV Push at bedtime  predniSONE  Solution 5 milliGRAM(s) Oral daily        ACCESS DEVICES:  [ ] Peripheral IV  [ ] Central Venous Line	[ ] R	[ ] L	[ ] IJ	[ ] Fem	[ ] SC	Placed:   [ ] Arterial Line		[ ] R	[ ] L	[ ] Fem	[ ] Rad	[ ] Ax	Placed:   [ ] PICC:					[ ] Mediport  [ ] Urinary Catheter, Date Placed:   [x] Necessity of urinary, arterial, and venous catheters discussed    OTHER MEDICATIONS:  chlorhexidine 0.12% Liquid 15 milliLiter(s) Oral Mucosa every 12 hours  chlorhexidine 2% Cloths 1 Application(s) Topical <User Schedule>      CODE STATUS: full code      IMAGING:  Magnetic resonance imaging of the brain was carried out with transaxial   SPGR, FLAIR, fast spin echo T2 weighted images, axial susceptibility   weighted series, diffusion weighted series and sagittal T1 weighted   series on a 1.5 Anisa magnet. Post contrast axial, coronal and sagittal   T1 weighted images were obtained. 6 cc of Gadavist were intravenously   injected, 1.5 cc were discarded.    Comparison is made with the prior brain CT of 6/24/2022.      Mild to moderate atrophy is identified with ventricular and sulcal   prominence. Extensive small vessel white matter ischemic changes are   noted. There is extensive bifrontal parietal encephalomalacia and gliosis   involving the centrum semiovale ovale in the border zone regions. These   do not demonstrate diffusion restriction and demonstrate bright signal   intensity on T1-weighted images which does not lymphoma on susceptibility   weighted series suggesting laminar necrosis rather than old hemorrhage.   After contrast administration there is mild gyral enhancement in a small   portion of the frontal cortex. There is no evidence of cerebritis or   abscess.        IMPRESSION: Extensive bilateral frontal parietal gliosis and   encephalomalacia with minimal enhancement which may be related to   ischemic changes versus changes of hypertensive encephalopathy in a   patient with renal transplant on immunosuppressants. No evidence of   cerebritis or abscess.

## 2022-06-28 NOTE — PROGRESS NOTE ADULT - PROBLEM SELECTOR PLAN 1
S/p DDRT on 4/21/22 Cr. stable in 2 range. ANA in the setting of hemodynamic instability 2/2 ATN. Donor was 58 , KDPI 82%, DCD, single vessels and ureter, HLA mismatch 1, 2, 2. No DSA, cPRA 0%. 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 Readmitted in May for anemia in the setting of large perinephric hematoma s/p evacuation and repair of arterial anastomosis on 5/2/22. Intra operative biopsy showed no rejection. As outpatient SCr. in th low 2 range. Admitted with lowest documented Cr. of 1.8 since transplant which has trended to 1.5 today. S/p Ertapenem for UTI + 1 dose on 6/22. Broad spectrum Abx resumed (On Vanc and MErrem). Received 1 dose of ganciclovir on 6/27. CXR reviewed. Optimize hemodynamics. Adequate UOP. C/w Amlodipine 10mg Daily for hypertension. Repeat MRI reviewed, largely unchanged showing chronic ischemic changes. For LP today.     C/w AED as per Neurology. Now on phenytoin, VPA and Peramparel. F/u Neurology.

## 2022-06-28 NOTE — AIRWAY REMOVAL NOTE  ADULT & PEDS - RESPIRATORY RHYTHM/PATTERN
no shortness of breath/rate regular
no shortness of breath/rate regular/depth regular/pattern regular

## 2022-06-28 NOTE — PROGRESS NOTE ADULT - ASSESSMENT
66 year old Male with PMHx ESRD s/p permacath removal 5/10/22 s/p Rt DDRT 4/21/22,  CVA (2019), Afib on apixaban, seizure activity, CAD s/p stents, CABG (2020), HTN, HLD, DM on insulin, gastric/duodenal ulcer, recent hospitalization 4/28/22 -5/10/22 with weakness/anemia found to have perinephric hematoma requiring evacuation and repair of bleeding arterial anastomosis who presented to [facility] for status epilepticus requiring intubation for hypoxic respiratory failure.    At [facility] was intubated on 6/10  On 6/11 underwent thoracentesis in context of Eliquis therapy  Developed bleeding into pleural space and require Chest tube placement  Planned for VATS    COVID19/FLU/RSV PCR (6/10) Negative  U/A (6/10) with 5 WBC and 25 RBC  Blood Cultures (6/10) NGTD  Pleural Fluid with 71 nucleated cells, 50% PMN, 31% Lymph.   Pleural Fluid Culture (6/11) Negative   CT Chest (6/12) with Right-sided chest tube with large hemothorax and atelectasis of the right lower lobe.    On 6/15 s/p uniportal R VATS, evacuation of hemothorax, pneumonolysis, decortication, intercostal nerve block.    CXR (6/21) with Rounded opacity in right midlung concerning for loculated pleural fluid or pneumonia  No leukocytosis, fevers or other clinical status changes  No obvious s/s of infection at present  Could represent residual hemothorax    Intermittent lethargic  with continued seizures  MRI Brain (6/20) with some findings suggestive of  PRES  MRI Brain (6/27) with Extensive bilateral frontal parietal gliosis and encephalomalacia with minimal enhancement     #Hypothermia, Encephalopathy/Seizures  Lower suspicion for encephalitis/meningitis  Reasonable to pursue LP to exclude CNS infection however.   --Would continue Ganciclovir 2.5 mg/kg IV Q24H pending LP  --Would continue to cover empirically with Vancomycin and Meropenem (if cultures remain unrevealing and LP unrevealing would stop antibiotics)  --If proceeding with LP would send for cell counts with differential, protein, glucose, CSF PCR Panel, HSV 1/2 PCR, CMV PCR, ERROL Virus PCR, WNV IgM/IgG, Bacterial, Fungal, AFB Cultures   --Follow up on PCR for EBV, HHV6, Parvovirus, Adenovirus  --Follow up on prelim blood cultures    #Positive Blood Culture (6/26 with 1/4 Staph epi)  Favor procurement contaminant  --Follow up on repeat blood cultures    #Residual Pleural Effusion  Thoracic Surgery input noted; no plans for further intervention  --Consider ultrasound of right effusion versus CT Chest noncontrast    #Positive Urine Culture (?UTI)  Of note, was being treated for UTI at rehab  UCx from 6/1 with >100K ESBL Klebsiella (Cipro R but Levofloxacin S)  He received 1-2 doses of Ertapenem and was then switched to Levofloxacin  Doubt the Levofloxacin was effective therapy given Cipro resistance  s/p Ertapenem 6/14 --> 6/18    #Renal Transplant Recipient, Prophylactic Antibiotic  --Continue Bactrim for PCP PPx  --Continue Valcyte for CMV PPx    I will be away tomorrow. Please contact the Infectious Diseases Office to contact the covering Infectious Diseases Attending.     Carlton Hoover M.D.  [facility] Division of Infectious Disease  8AM-5PM Monday - Friday: Available on [employer] Teams  After Hours and Holidays (or if no response on [employer] Teams): Please contact the Infectious Diseases Office at [phone]     The above assessment and plan were discussed with Saeid transplant surgery PA

## 2022-06-28 NOTE — PROGRESS NOTE ADULT - SUBJECTIVE AND OBJECTIVE BOX
Follow Up:      Interval History:    REVIEW OF SYSTEMS  [  ] ROS unobtainable because:    [  ] All other systems negative except as noted below    Constitutional:  [ ] fever [ ] chills  [ ] weight loss  [ ] weakness  Skin:  [ ] rash [ ] phlebitis	  Eyes: [ ] icterus [ ] pain  [ ] discharge	  ENMT: [ ] sore throat  [ ] thrush [ ] ulcers [ ] exudates  Respiratory: [ ] dyspnea [ ] hemoptysis [ ] cough [ ] sputum	  Cardiovascular:  [ ] chest pain [ ] palpitations [ ] edema	  Gastrointestinal:  [ ] nausea [ ] vomiting [ ] diarrhea [ ] constipation [ ] pain	  Genitourinary:  [ ] dysuria [ ] frequency [ ] hematuria [ ] discharge [ ] flank pain  [ ] incontinence  Musculoskeletal:  [ ] myalgias [ ] arthralgias [ ] arthritis  [ ] back pain  Neurological:  [ ] headache [ ] seizures  [ ] confusion/altered mental status    Allergies  No Known Allergies        ANTIMICROBIALS:  fluconAZOLE IVPB 200 every 24 hours  meropenem  IVPB 1000 every 12 hours  trimethoprim  40 mG/sulfamethoxazole 200 mG Suspension 80 <User Schedule>      OTHER MEDS:  MEDICATIONS  (STANDING):  amLODIPine   Tablet 10 <User Schedule>  atorvastatin 40 at bedtime  epoetin cortney-epbx (RETACRIT) Injectable 69702 every 7 days  fosphenytoin IVPB 100 every 8 hours  heparin  Infusion 800 <Continuous>  hydrALAZINE 25 every 8 hours  HYDROmorphone  Injectable 0.5 every 3 hours PRN  insulin lispro (ADMELOG) corrective regimen sliding scale  every 4 hours  levothyroxine Injectable 40 at bedtime  pantoprazole  Injectable 40 daily  perampanel 4 at bedtime  polyethylene glycol 3350 17 daily  predniSONE  Solution 5 daily  senna Syrup 10 daily      Vital Signs Last 24 Hrs  T(C): 35.9 (28 Jun 2022 15:00), Max: 37.4 (27 Jun 2022 20:00)  T(F): 96.6 (28 Jun 2022 15:00), Max: 99.3 (27 Jun 2022 20:00)  HR: 63 (28 Jun 2022 18:00) (57 - 71)  BP: --  BP(mean): --  RR: 14 (28 Jun 2022 18:00) (10 - 24)  SpO2: 97% (28 Jun 2022 18:00) (97% - 100%)    PHYSICAL EXAMINATION:  General: Alert and Awake, NAD  HEENT: PERRL, EOMI  Neck: Supple  Cardiac: RRR, No M/R/G  Resp: CTAB, No Wh/Rh/Ra  Abdomen: NBS, NT/ND, No HSM, No rigidity or guarding  MSK: No LE edema. No Calf tenderness  : No tucker  Skin: No rashes or lesions. Skin is warm and dry to the touch.   Neuro: Alert and Awake. CN 2-12 Grossly intact. Moves all four extremities spontaneously.  Psych: Calm, Pleasant, Cooperative                          8.5    5.18  )-----------( 446      ( 28 Jun 2022 04:10 )             27.1       06-28    141  |  108  |  28<H>  ----------------------------<  143<H>  4.9   |  25  |  1.51<H>    Ca    8.3<L>      28 Jun 2022 04:10  Phos  3.0     06-28  Mg     2.2     06-28    TPro  5.7<L>  /  Alb  2.3<L>  /  TBili  0.2  /  DBili  x   /  AST  14  /  ALT  9<L>  /  AlkPhos  93  06-28          MICROBIOLOGY:  v  Combi-Cath Combi-Cath  06-26-22   Normal Respiratory Rachel present  --    Few polymorphonuclear leukocytes per low power field  Rare Squamous Epithelial Cells per low power field  Rare Gram Positive Rods per oil power field      .Blood Blood-Peripheral  06-26-22   No growth to date.  --  --      .Blood Blood-Peripheral  06-26-22   Growth in anaerobic bottle: Staphylococcus epidermidis  Coag Negative Staphylococcus  Single set isolate, possible contaminant. Contact  Microbiology if susceptibility testing clinically  indicated.  ***Blood Panel PCR results on this specimen are available  approximately 3 hours after the Gram stain result.***  Gram stain, PCR, and/or culture results may not always  correspond due to difference in methodologies.  ************************************************************  This PCR assay was performed by multiplex PCR. This  Assay tests for 66 bacterial and resistance gene targets.  Please refer to the [facility] test directory  at [email or web address] for details.  --  Blood Culture PCR      Catheterized Catheterized  06-26-22   No growth  --  --      .Blood Blood-Peripheral  06-21-22   No Growth Final  --  --      .Blood Blood  06-19-22   No Growth Final  --  --      .Body Fluid Pleural Fluid  06-11-22   No growth at 5 days  --    Few polymorphonuclear leukocytes seen  No organisms seen      .Blood Blood  06-10-22   No Growth Final  --  --      .Blood Blood  06-10-22   No Growth Final  --  --          CMVPCR Log: NotDetec Gcl27CI/mL (06-26 @ 18:14)  CMVPCR Log: NotDetec Dez35AT/mL (06-25 @ 14:08)        RADIOLOGY:    <The imaging below has been reviewed and visualized by me independently. Findings as detailed in report below> Follow Up:  Hypothermia    Interval History: extubated this AM. no acute overnight events.     REVIEW OF SYSTEMS  [  ] ROS unobtainable because:    [ x] All other systems negative except as noted below    Constitutional:  [ ] fever [ ] chills  [ ] weight loss  [ ] weakness  Skin:  [ ] rash [ ] phlebitis	  Eyes: [ ] icterus [ ] pain  [ ] discharge	  ENMT: [ ] sore throat  [ ] thrush [ ] ulcers [ ] exudates  Respiratory: [ ] dyspnea [ ] hemoptysis [ ] cough [ ] sputum	  Cardiovascular:  [ ] chest pain [ ] palpitations [ ] edema	  Gastrointestinal:  [ ] nausea [ ] vomiting [ ] diarrhea [ ] constipation [ ] pain	  Genitourinary:  [ ] dysuria [ ] frequency [ ] hematuria [ ] discharge [ ] flank pain  [ ] incontinence  Musculoskeletal:  [ ] myalgias [ ] arthralgias [ ] arthritis  [ ] back pain  Neurological:  [ ] headache [ ] seizures  [ ] confusion/altered mental status    Allergies  No Known Allergies        ANTIMICROBIALS:  fluconAZOLE IVPB 200 every 24 hours  meropenem  IVPB 1000 every 12 hours  trimethoprim  40 mG/sulfamethoxazole 200 mG Suspension 80 <User Schedule>      OTHER MEDS:  MEDICATIONS  (STANDING):  amLODIPine   Tablet 10 <User Schedule>  atorvastatin 40 at bedtime  epoetin cortney-epbx (RETACRIT) Injectable 45935 every 7 days  fosphenytoin IVPB 100 every 8 hours  heparin  Infusion 800 <Continuous>  hydrALAZINE 25 every 8 hours  HYDROmorphone  Injectable 0.5 every 3 hours PRN  insulin lispro (ADMELOG) corrective regimen sliding scale  every 4 hours  levothyroxine Injectable 40 at bedtime  pantoprazole  Injectable 40 daily  perampanel 4 at bedtime  polyethylene glycol 3350 17 daily  predniSONE  Solution 5 daily  senna Syrup 10 daily      Vital Signs Last 24 Hrs  T(C): 35.9 (28 Jun 2022 15:00), Max: 37.4 (27 Jun 2022 20:00)  T(F): 96.6 (28 Jun 2022 15:00), Max: 99.3 (27 Jun 2022 20:00)  HR: 63 (28 Jun 2022 18:00) (57 - 71)  BP: --  BP(mean): --  RR: 14 (28 Jun 2022 18:00) (10 - 24)  SpO2: 97% (28 Jun 2022 18:00) (97% - 100%)    PHYSICAL EXAMINATION:  General: Alert and Awake, NAD  Cardiac: RRR, No M/R/G  Resp: CTAB, No Wh/Rh/Ra  Abdomen: NBS, NT/ND, No HSM, No rigidity or guarding  MSK: No LE edema. No Calf tenderness  Skin: No rashes or lesions. Skin is warm and dry to the touch.   Neuro: Alert and Awake. CN 2-12 Grossly intact. Moves all four extremities spontaneously.  Psych: Encephalopathic - unable to assess                          8.5    5.18  )-----------( 446      ( 28 Jun 2022 04:10 )             27.1       06-28    141  |  108  |  28<H>  ----------------------------<  143<H>  4.9   |  25  |  1.51<H>    Ca    8.3<L>      28 Jun 2022 04:10  Phos  3.0     06-28  Mg     2.2     06-28    TPro  5.7<L>  /  Alb  2.3<L>  /  TBili  0.2  /  DBili  x   /  AST  14  /  ALT  9<L>  /  AlkPhos  93  06-28          MICROBIOLOGY:  v  Combi-Cath Combi-Cath  06-26-22   Normal Respiratory Rachel present  --    Few polymorphonuclear leukocytes per low power field  Rare Squamous Epithelial Cells per low power field  Rare Gram Positive Rods per oil power field      .Blood Blood-Peripheral  06-26-22   No growth to date.  --  --      .Blood Blood-Peripheral  06-26-22   Growth in anaerobic bottle: Staphylococcus epidermidis  Coag Negative Staphylococcus  Single set isolate, possible contaminant. Contact  Microbiology if susceptibility testing clinically  indicated.  ***Blood Panel PCR results on this specimen are available  approximately 3 hours after the Gram stain result.***  Gram stain, PCR, and/or culture results may not always  correspond due to difference in methodologies.  ************************************************************  This PCR assay was performed by multiplex PCR. This  Assay tests for 66 bacterial and resistance gene targets.  Please refer to the [facility] test directory  at [email or web address] for details.  --  Blood Culture PCR      Catheterized Catheterized  06-26-22   No growth  --  --      .Blood Blood-Peripheral  06-21-22   No Growth Final  --  --      .Blood Blood  06-19-22   No Growth Final  --  --      .Body Fluid Pleural Fluid  06-11-22   No growth at 5 days  --    Few polymorphonuclear leukocytes seen  No organisms seen      .Blood Blood  06-10-22   No Growth Final  --  --      .Blood Blood  06-10-22   No Growth Final  --  --          CMVPCR Log: NotDetec Ole18BL/mL (06-26 @ 18:14)  CMVPCR Log: NotDetec Hvf44UA/mL (06-25 @ 14:08)        RADIOLOGY:    <The imaging below has been reviewed and visualized by me independently. Findings as detailed in report below>    < from: MR Head w/wo IV Cont (06.27.22 @ 15:23) >  IMPRESSION: Extensive bilateral frontal parietal gliosis and   encephalomalacia with minimal enhancement which may be related to   ischemic changes versus changes of hypertensive encephalopathy in a   patient with renal transplant on immunosuppressants. No evidence of   cerebritis or abscess.    < end of copied text >

## 2022-06-28 NOTE — PROGRESS NOTE ADULT - CRITICAL CARE ATTENDING COMMENT
Dr. Martin (Attending Physician)  N - Seizures on valproic acid, fosphenytoin, fycoma repeat MRI yesterday more c/w chronic microvascular ischemic disease than PRES neurology following on   P - will sbt today for extubation  C - sinus bradycardia  GI - on TFs at goal   - DDRT with ANA improving, fluid in fissure on CXR will give low dose lasix for   H - epogen weekly, hep gtt for RIJ dvt at goal  ID - on vanc/meropenem for meningitis treatment, received ganciclovir yesterday for encephalitis  E - on steroids, holding other immunosuppressive agents, ssi  PPx - on ppi  Lines - left radial a-line  Dispo - to remain in SICU

## 2022-06-28 NOTE — PROGRESS NOTE ADULT - ASSESSMENT
66 yr old M with Type 2 DM> unknown control due to skewed A1C 6.6% due to chronic anemia and s/p blood tx. On basal/bolus insulin therapy PTA. DM c/b ESRD s/p DDRT on 3/21, CVA, CAD s/p CABG, Afib on apixaban, seizure activity, HTN, HLD, h/o GI bleed in 2/2022 EGD with duodenal ulcer, discharged to [facility] rehab center after prior hospitalization, now re-admitted from [facility] for seizures c/f status epilepticus in setting of UTI, requiring intubation for hypoxic respiratory failure and admission to MICU, ccb pleural effusion c/b bleeding s/p VATS> extubated and transferred to medicine floor. Patient was having 10-11 seizures per hour, transferred to SICU for monitoring. On 6/26, pt became less responsive with worsening lethargy and was subsequently intubated for airway protection. On 6/27, pt became hypothermic with low WBC and was started on empiric abx. Now pt clinical condition improving> been extubated at time of visit. BG levels variable due to on/off TF administration. TFs off at time of visit but per ICU team will restart TF later on today. Pt to start NPH insulin low dose if BG >180s while on TFs of Glucerna at 50cc/hr.   BG goal (100-180mg/dl).

## 2022-06-28 NOTE — PROGRESS NOTE ADULT - SUBJECTIVE AND OBJECTIVE BOX
DIABETES FOLLOW UP NOTE: Saw pt earlier today    Chief Complaint: Endocrine consult requested for management of T2DM    INTERVAL HX: Saw pt this am. ICU extubating pt at time of visit. TFs off for extubation. BG levels variable between 80s to 200s in the ;ast 24 hours while on present insulin doses. No hypoglycemia. On Prednisone 5mg daily. Per ICU team TFs will be restarted later today.       Review of Systems:  General: As above  Unable due to pt been in the process of extubation      Allergies    No Known Allergies    Intolerances      MEDICATIONS:  atorvastatin 40 milliGRAM(s) Oral at bedtime  fluconAZOLE IVPB 200 milliGRAM(s) IV Intermittent every 24 hours  insulin lispro (ADMELOG) corrective regimen sliding scale   SubCutaneous every 4 hours  levothyroxine Injectable 40 MICROGram(s) IV Push at bedtime  meropenem  IVPB 1000 milliGRAM(s) IV Intermittent every 12 hours  predniSONE  Solution 5 milliGRAM(s) Oral daily  trimethoprim  40 mG/sulfamethoxazole 200 mG Suspension 80 milliGRAM(s) Oral <User Schedule>      PHYSICAL EXAM:  VITALS: T(C): 35.9 (06-28-22 @ 15:00)  T(F): 96.6 (06-28-22 @ 15:00), Max: 99.3 (06-27-22 @ 20:00)  HR: 65 (06-28-22 @ 16:00) (53 - 71)  BP: 142/65 (06-27-22 @ 19:00) (142/65 - 142/65)  RR:  (11 - 24)  SpO2:  (99% - 100%)  Wt(kg): --  GENERAL: Male laying in bed in NAD. Son at bedside (MD)  Abdomen: Soft, nontender, non distended  Extremities: Warm,   NEURO: Alert     LABS:  POCT Blood Glucose.: 127 mg/dL (06-28-22 @ 14:22)  POCT Blood Glucose.: 266 mg/dL (06-28-22 @ 10:30)  POCT Blood Glucose.: 96 mg/dL (06-28-22 @ 01:24)  POCT Blood Glucose.: 263 mg/dL (06-27-22 @ 22:05)  POCT Blood Glucose.: 237 mg/dL (06-27-22 @ 17:06)  POCT Blood Glucose.: 154 mg/dL (06-27-22 @ 11:37)  POCT Blood Glucose.: 124 mg/dL (06-27-22 @ 05:11)  POCT Blood Glucose.: 85 mg/dL (06-27-22 @ 01:54)  POCT Blood Glucose.: 84 mg/dL (06-26-22 @ 23:05)  POCT Blood Glucose.: 104 mg/dL (06-26-22 @ 20:16)  POCT Blood Glucose.: 68 mg/dL (06-26-22 @ 19:42)  POCT Blood Glucose.: 106 mg/dL (06-26-22 @ 15:08)  POCT Blood Glucose.: 109 mg/dL (06-26-22 @ 11:13)  POCT Blood Glucose.: 132 mg/dL (06-26-22 @ 07:19)                            8.5    5.18  )-----------( 446      ( 28 Jun 2022 04:10 )             27.1       06-28    141  |  108  |  28<H>  ----------------------------<  143<H>  4.9   |  25  |  1.51<H>    eGFR: 50<L>    Ca    8.3<L>      06-28  Mg     2.2     06-28  Phos  3.0     06-28    TPro  5.7<L>  /  Alb  2.3<L>  /  TBili  0.2  /  DBili  x   /  AST  14  /  ALT  9<L>  /  AlkPhos  93  06-28      A1C with Estimated Average Glucose Result: 6.6 % (04-24-22 @ 17:12) Skewed due to anemia and blood TX      Estimated Average Glucose: 143 mg/dL (04-24-22 @ 17:12)

## 2022-06-28 NOTE — PROGRESS NOTE ADULT - TIME BILLING
Kidney recipient with functioning allograft  Critically ill, intubated , in ICU  HTN, DM,  HLD, CVA, Seizures,  downtrending creatinine  S/p VATS  Intubated on combination regimen for seizure management, Empiric antimicrobial coverage  Reviewed immunosuppression, management regimen for comorbidities  On modified immunosuppression, currently on Steroids  As d/w neurologist, EEG suggestive of improved control of seizures.  Suggestions:  Continue minimized immunosuppression  Discussed with SICU team and Neurologist  ID and Neuro follow up  D/w  transplant team  I was present during and reviewed clinical and lab data as well as assessment and plan as documented by the house staff as noted. Please contact if any additional questions with any change in clinical condition or on availability of any additional information or reports.

## 2022-06-28 NOTE — PROGRESS NOTE ADULT - NSPROGADDITIONALINFOA_GEN_ALL_CORE
-Plan discussed with pt/team.  Contact info: [phone] (24/7). pager [phone]  [email or web address] password [email or web address]  Spent  over 25 minutes assessing pt/labs/meds and discussing plan of care with primary team and son  Adjusting insulin  Discharge plan  Follow up care

## 2022-06-28 NOTE — PROGRESS NOTE ADULT - PROBLEM SELECTOR PLAN 1
Chief Complaint   Patient presents with   • Annual Exam     Labs, lumbar surgery 6/21, HTN,anxiety       HISTORY OF PRESENT ILLNESS: Patient is a 49 y.o. male, established patient who presents today to discuss medical problems as listed below:    Health Maintenance:  COMPLETED     Anxiety  Problem.  This is a chronic intermittent problem for patient.  He has an upcoming surgery on Monday, June 21, 2021, posterior lumbar microdiscectomy left L4-5.  He has hard time sleeping.  The surgery is related to work's comp since January 2021.  His pain is in his leg and his back.  His blood pressure today is 150/90 with a pulse of 100.  He denies CP, dyspnea, dizziness or peripheral edema.    Elevated BP without diagnosis of hypertension  New problem.  Patient reports fluctuating blood pressures.  BP today is 150 over 90 pulse of 100.  States he is experiencing lots of anxiety due to upcoming Worker's Comp surgery on his back.  Does have history of chronic intermittent anxiety.  He is also in pain related to his injury in January.  He denies CP, dyspnea, dizziness or peripheral edema.      Patient Active Problem List    Diagnosis Date Noted   • Lipoma of upper arm 06/17/2021   • Elevated BP without diagnosis of hypertension 06/17/2021   • Need for vaccination 08/28/2020   • Administrative encounter 08/28/2020   • Allergies 08/28/2020   • Penile abnormality 08/28/2020   • Nasal septal deviation 03/31/2020   • Subacute maxillary sinusitis 03/31/2020   • Establishing care with new doctor, encounter for 02/12/2020   • OMAR (generalized anxiety disorder) 02/12/2020   • Anxiety 06/05/2018   • Impaired fasting glucose 03/15/2018   • Lipoma of left upper extremity 03/15/2018        Allergies: Codeine, Iodine, Penicillins, and Shellfish allergy    Current Outpatient Medications   Medication Sig Dispense Refill   • busPIRone (BUSPAR) 10 MG Tab tablet Take 1 tablet by mouth 2 times a day. 60 tablet 1   • fluticasone (FLONASE) 50 MCG/ACT  nasal spray Spray 1 Spray in nose every bedtime. 1 g 1   • epoetin olivier (EPOGEN,PROCRIT) 12687 Unit/mL Solution  (Patient not taking: Reported on 6/17/2021)       No current facility-administered medications for this visit.       Social History     Tobacco Use   • Smoking status: Never Smoker   • Smokeless tobacco: Never Used   Vaping Use   • Vaping Use: Never used   Substance Use Topics   • Alcohol use: No   • Drug use: No     Social History     Social History Narrative   • Not on file       Family History   Problem Relation Age of Onset   • Other Mother         hepatitis C    • Diabetes Mother    • Alcohol/Drug Father    • Diabetes Maternal Grandmother    • Cancer Maternal Grandmother         skin cancer       Allergies, past medical history, past surgical history, family history, social history reviewed and updated.    Review of Systems:     - Constitutional: Negative for fever, chills, unexpected weight change, and fatigue/generalized weakness.     - Respiratory: Negative for cough, sputum production, chest congestion, dyspnea, wheezing, and crackles.      - Cardiovascular: Negative for chest pain, palpitations, orthopnea, and bilateral lower extremity edema.     - Psychiatric/Behavioral: anxiety. Negative for depression, suicidal/homicidal ideation and memory loss.      All other systems reviewed and are negative    Exam:    /90   Pulse 100   Temp 36.1 °C (97 °F) (Temporal)   Resp 12   Ht 1.829 m (6')   Wt 98.7 kg (217 lb 9.6 oz)   SpO2 97%   BMI 29.51 kg/m²  Body mass index is 29.51 kg/m².    Physical Exam:  Constitutional: Well-developed and well-nourished. Not diaphoretic. No distress.   Cardiovascular: Regular rate and rhythm, S1 and S2 without murmur, rubs, or gallops.    Chest: Effort normal. Clear to auscultation throughout. No adventitious sounds. Neurological: Alert and oriented x 3.   Psychiatric:  Behavior, mood, and affect are appropriate.  MA/nursing note and vitals reviewed.    LABS:  2020  results reviewed and discussed with the patient, questions answered.    My total time spent caring for the patient on the day of the encounter was 25 minutes.   This does not include time spent on separately billable procedures/tests.     Assessment/Plan:  1. Anxiety  Uncontrolled, stable.  Trial of BuSpar twice daily.  Discussed nonpharmacological anxiety and stress management such as slow deep breathing, meditation, therapeutic journaling.  Patient advised to explore additional antistress anxiety nonpharmacological techniques.  - busPIRone (BUSPAR) 10 MG Tab tablet; Take 1 tablet by mouth 2 times a day.  Dispense: 60 tablet; Refill: 1    2. Elevated BP without diagnosis of hypertension  Uncontrolled, stable.  Discussed etiology.  Discussed the importance of stress control.  Would like to obtain baseline through BP log.  Patient educated on proper BP technique, obtain BP cuff on the upper arm, measure BP in a.m. and p.m. we'll follow-up in 1 month.    3. Annual physical exam  - Comp Metabolic Panel; Future  - CBC WITHOUT DIFFERENTIAL; Future  - Lipid Profile; Future  - TSH; Future  - VITAMIN D,25 HYDROXY; Future    4. Screening for colorectal cancer  - REFERRAL TO GI FOR COLONOSCOPY       Discussed with patient possible alternative diagnoses, pt is to take all medications as prescribed. If symptoms persist FU w/PCP, if symptoms worsen go to emergency room. If experiencing any side effects from prescribed medications reports to the office immediately or go to emergency room.  Reviewed indication, dosage, usage and potential adverse effects of prescribed medications. Reviewed risks and benefits of treatment plan. Patient verbalizes understanding of all instruction and verbally agrees to plan.    No follow-ups on file. annual   -test BG q6h whil;e NPO/TFs  -C/w Admelog moderate correction scale q6h while on TFs  -Add NPH 2 units of  BG >180S X2 while on TFs. HOLD IF TFS ARE OFF  -Will adjust as needed  Discharge  - Likely discharge on basal bolus insulin, Plan TBD based on insulin requirements at DC.   Outpatient f/u with Endocrinologist Dr. Lincoln. [address]. Phone  Needs apt at time of discharge  -Needs optho/renal/cardiac f/u as out pt  -Make sure pt has insulin sand DM supplies at time of discharge.

## 2022-06-28 NOTE — EEG REPORT - NS EEG TEXT BOX
[facility]   COMPREHENSIVE EPILEPSY CENTER   REPORT OF LONG-TERM VIDEO EEG     [facility]: [address], [place] [address], Ph#: [phone]  [facility]: [id number] [place] [address], Ph#: [phone]  [facility]: [address] [place] [address], Ph#: [phone]    Patient Name: CHAD DUNBAR  Age and : 66y ([birth year])  MRN #: [id number]  Location: [place] [address]  Referring Physician: Lizz Davis    Start Time/Date: 08:00 on 22  End Time/Date: 08:00 on 22  Duration: 24 h    _____________________________________________________________  STUDY INFORMATION    EEG Recording Technique:  The patient underwent continuous Video-EEG monitoring, using Telemetry System hardware on the XLTek Digital System. EEG and video data were stored on a computer hard drive with important events saved in digital archive files. The material was reviewed by a physician (electroencephalographer / epileptologist) on a daily basis. Ramon and seizure detection algorithms were utilized and reviewed. An EEG Technician attended to the patient, and was available throughout daytime work hours.  The epilepsy center neurologist was available in person or on call 24-hours per day.    EEG Placement and Labeling of Electrodes:  The EEG was performed utilizing 20 channel referential EEG connections (coronal over temporal over parasagittal montage) using all standard 10-20 electrode placements with EKG, with additional electrodes placed in the inferior temporal region using the modified 10-10 montage electrode placements for elective admissions, or if deemed necessary. Recording was at a sampling rate of 256 samples per second per channel. Time synchronized digital video recording was done simultaneously with EEG recording. A low light infrared camera was used for low light recording.     _____________________________________________________________  HISTORY    Patient is a 66y old  Male who presents with a chief complaint of Status Epilepticus (2022 00:42)      PERTINENT MEDICATION:  MEDICATIONS  (STANDING):  amLODIPine   Tablet 10 milliGRAM(s) Oral daily  atorvastatin 40 milliGRAM(s) Oral at bedtime  chlorhexidine 0.12% Liquid 15 milliLiter(s) Oral Mucosa every 12 hours  chlorhexidine 2% Cloths 1 Application(s) Topical <User Schedule>  epoetin cortney-epbx (RETACRIT) Injectable 35989 Unit(s) SubCutaneous every 7 days  fluconAZOLE IVPB 200 milliGRAM(s) IV Intermittent every 24 hours  fosphenytoin IVPB 100 milliGRAM(s) PE IV Intermittent every 8 hours  heparin  Infusion 800 Unit(s)/Hr (9.5 mL/Hr) IV Continuous <Continuous>  insulin lispro (ADMELOG) corrective regimen sliding scale   SubCutaneous every 6 hours  levothyroxine Injectable 40 MICROGram(s) IV Push at bedtime  meropenem  IVPB 1000 milliGRAM(s) IV Intermittent every 12 hours  pantoprazole  Injectable 40 milliGRAM(s) IV Push daily  perampanel 4 milliGRAM(s) Oral at bedtime  polyethylene glycol 3350 17 Gram(s) Oral daily  predniSONE  Solution 5 milliGRAM(s) Oral daily  senna Syrup 10 milliLiter(s) Oral daily  trimethoprim  40 mG/sulfamethoxazole 200 mG Suspension 80 milliGRAM(s) Oral <User Schedule>  valGANciclovir 50 mG/mL Oral Solution 450 milliGRAM(s) Oral <User Schedule>  valproate sodium  IVPB 1000 milliGRAM(s) IV Intermittent every 8 hours  vancomycin  IVPB 750 milliGRAM(s) IV Intermittent once    _____________________________________________________________  STUDY INTERPRETATION    Findings: The background was continuous, spontaneously variable and reactive. During brief wakefulness, the posterior dominant rhythm consisted of symmetric, well-modulated 6-7 Hz activity.     Background Slowing:  Diffuse theta and polymorphic delta slowing.    Focal Slowing:   Continuous theta/delta slowing in the left frontotemporal region.     Sleep Background:  Drowsiness was characterized by fragmentation, attenuation, and slowing of the background activity.    Sleep was characterized by the presence of vertex waves, symmetric rudimentary sleep spindles and K complexes.    Non-Epileptiform Findings:  None were present.    Interictal Epileptiform Activity:   Rare right frontal and left frontal sharp waves at times occurring in synchrony.    Events:  Patient event at 22:08 with no obvious clinical change on video had no abnormal EEG correlate  Patient event at 03:54 patient with asynchronous movements of left leg, no abnormal EEG correlate.  Seizures: None recorded.    Activation Procedures:   Hyperventilation was not performed.    Photic stimulation was not performed.     Artifacts:  Intermittent myogenic and movement artifacts were noted.    ECG:  The heart rate on single channel ECG was predominantly between 60-70 bpm.    _____________________________________________________________  EEG SUMMARY/CLASSIFICATION    Abnormal EEG in an altered / a sedated patient.  - Rare right frontal and left frontal sharp waves  - Patient events as described above  - Left frontotemporal slowing  - Mild to moderate generalized slowing    _____________________________________________________________  EEG IMPRESSION/CLINICAL CORRELATE    Abnormal EEG study.  Event of left leg shaking was not epileptic.  Potential epileptogenic foci in the right frontal and left frontal regions.  Structural or functional abnormality in the left frontotemporal region.  Mild to moderate nonspecific diffuse or multifocal cerebral dysfunction.   No seizure seen.    _____________________________________________________________    Irma Bennett DO  Attending Physician, [facility] Epilepsy [facility]     [facility]   COMPREHENSIVE EPILEPSY CENTER   REPORT OF LONG-TERM VIDEO EEG     [facility]: [address], [address], [place] [address], Ph#: [phone]  [facility]: [id number] [place] [address], Ph#: [phone]  [facility]: [address] [place] [address], Ph#: [phone]    Patient Name: CHAD DUNBAR  Age and : 66y ([birth year])  MRN #: [id number]  Location: [place] [address]  Referring Physician: Lizz Davis    Start Time/Date: 08:00 on 22  End Time/Date: 15:16 on 22  Duration: 31 h 16 min    _____________________________________________________________  STUDY INFORMATION    EEG Recording Technique:  The patient underwent continuous Video-EEG monitoring, using Telemetry System hardware on the XLTek Digital System. EEG and video data were stored on a computer hard drive with important events saved in digital archive files. The material was reviewed by a physician (electroencephalographer / epileptologist) on a daily basis. Ramon and seizure detection algorithms were utilized and reviewed. An EEG Technician attended to the patient, and was available throughout daytime work hours.  The epilepsy center neurologist was available in person or on call 24-hours per day.    EEG Placement and Labeling of Electrodes:  The EEG was performed utilizing 20 channel referential EEG connections (coronal over temporal over parasagittal montage) using all standard 10-20 electrode placements with EKG, with additional electrodes placed in the inferior temporal region using the modified 10-10 montage electrode placements for elective admissions, or if deemed necessary. Recording was at a sampling rate of 256 samples per second per channel. Time synchronized digital video recording was done simultaneously with EEG recording. A low light infrared camera was used for low light recording.     _____________________________________________________________  HISTORY    Patient is a 66y old  Male who presents with a chief complaint of Status Epilepticus (2022 00:42)      PERTINENT MEDICATION:  MEDICATIONS  (STANDING):  amLODIPine   Tablet 10 milliGRAM(s) Oral daily  atorvastatin 40 milliGRAM(s) Oral at bedtime  chlorhexidine 0.12% Liquid 15 milliLiter(s) Oral Mucosa every 12 hours  chlorhexidine 2% Cloths 1 Application(s) Topical <User Schedule>  epoetin cortney-epbx (RETACRIT) Injectable 26229 Unit(s) SubCutaneous every 7 days  fluconAZOLE IVPB 200 milliGRAM(s) IV Intermittent every 24 hours  fosphenytoin IVPB 100 milliGRAM(s) PE IV Intermittent every 8 hours  heparin  Infusion 800 Unit(s)/Hr (9.5 mL/Hr) IV Continuous <Continuous>  insulin lispro (ADMELOG) corrective regimen sliding scale   SubCutaneous every 6 hours  levothyroxine Injectable 40 MICROGram(s) IV Push at bedtime  meropenem  IVPB 1000 milliGRAM(s) IV Intermittent every 12 hours  pantoprazole  Injectable 40 milliGRAM(s) IV Push daily  perampanel 4 milliGRAM(s) Oral at bedtime  polyethylene glycol 3350 17 Gram(s) Oral daily  predniSONE  Solution 5 milliGRAM(s) Oral daily  senna Syrup 10 milliLiter(s) Oral daily  trimethoprim  40 mG/sulfamethoxazole 200 mG Suspension 80 milliGRAM(s) Oral <User Schedule>  valGANciclovir 50 mG/mL Oral Solution 450 milliGRAM(s) Oral <User Schedule>  valproate sodium  IVPB 1000 milliGRAM(s) IV Intermittent every 8 hours  vancomycin  IVPB 750 milliGRAM(s) IV Intermittent once    _____________________________________________________________  STUDY INTERPRETATION    Findings: The background was continuous, spontaneously variable and reactive. During brief wakefulness, the posterior dominant rhythm consisted of symmetric, well-modulated 6-7 Hz activity.     Background Slowing:  Diffuse theta and polymorphic delta slowing.    Focal Slowing:   Continuous theta/delta slowing in the left frontotemporal region.     Sleep Background:  Drowsiness was characterized by fragmentation, attenuation, and slowing of the background activity.    Sleep was characterized by the presence of vertex waves, symmetric rudimentary sleep spindles and K complexes.    Non-Epileptiform Findings:  None were present.    Interictal Epileptiform Activity:   Rare right frontal and left frontal sharp waves at times occurring in synchrony.    Events:  Patient event at 22:08 with no obvious clinical change on video had no abnormal EEG correlate  Patient event at 03:54 patient with asynchronous movements of left leg, no abnormal EEG correlate.  Seizures: None recorded.    Activation Procedures:   Hyperventilation was not performed.    Photic stimulation was not performed.     Artifacts:  Intermittent myogenic and movement artifacts were noted.    ECG:  The heart rate on single channel ECG was predominantly between 60-70 bpm.    _____________________________________________________________  EEG SUMMARY/CLASSIFICATION    Abnormal EEG in an altered / a sedated patient.  - Rare right frontal and left frontal sharp waves  - Patient events as described above  - Left frontotemporal slowing  - Mild to moderate generalized slowing    _____________________________________________________________  EEG IMPRESSION/CLINICAL CORRELATE    Abnormal EEG study.  Event of left leg shaking was not epileptic.  Potential epileptogenic foci in the right frontal and left frontal regions.  Structural or functional abnormality in the left frontotemporal region.  Mild to moderate nonspecific diffuse or multifocal cerebral dysfunction.   No seizure seen.    _____________________________________________________________    Irma Bennett DO  Attending Physician, [facility] Epilepsy [place]

## 2022-06-28 NOTE — PROGRESS NOTE ADULT - SUBJECTIVE AND OBJECTIVE BOX
[facility]   MD NINA MASON MD, DO SADIE RAMIREZ NP    TEL:  OFFICE: [phone]    From 5pm-7am Answering Service [phone]    -- RENAL FOLLOW UP NOTE ---Date of Service 06-28-22 @ 14:49    Patient is a 66y old  Male who presents with a chief complaint of Status Epilepticus (28 Jun 2022 10:57)      Patient seen and examined in ICU.     VITALS:  T(F): 98.2 (06-28-22 @ 11:00), Max: 99.3 (06-27-22 @ 20:00)  HR: 64 (06-28-22 @ 12:00)  BP: 142/65 (06-27-22 @ 19:00)  RR: 13 (06-28-22 @ 12:00)  SpO2: 100% (06-28-22 @ 12:00)  Wt(kg): --    06-27 @ 07:01  -  06-28 @ 07:00  --------------------------------------------------------  IN: 1931.5 mL / OUT: 1445 mL / NET: 486.5 mL    06-28 @ 07:01  -  06-28 @ 14:49  --------------------------------------------------------  IN: 400 mL / OUT: 440 mL / NET: -40 mL      PHYSICAL EXAM:  Constitutional: NAD  Neck: No JVD  Respiratory: CTAB, no wheezes, rales or rhonchi  Cardiovascular: S1, S2, RRR  Gastrointestinal: BS+, soft, NT/ND  Extremities: No peripheral edema    Hospital Medications:   MEDICATIONS  (STANDING):  amLODIPine   Tablet 10 milliGRAM(s) Oral <User Schedule>  atorvastatin 40 milliGRAM(s) Oral at bedtime  chlorhexidine 2% Cloths 1 Application(s) Topical <User Schedule>  epoetin cortney-epbx (RETACRIT) Injectable 03795 Unit(s) SubCutaneous every 7 days  fluconAZOLE IVPB 200 milliGRAM(s) IV Intermittent every 24 hours  fosphenytoin IVPB 100 milliGRAM(s) PE IV Intermittent every 8 hours  heparin  Infusion 800 Unit(s)/Hr (12 mL/Hr) IV Continuous <Continuous>  hydrALAZINE 25 milliGRAM(s) Oral every 8 hours  insulin lispro (ADMELOG) corrective regimen sliding scale   SubCutaneous every 4 hours  levothyroxine Injectable 40 MICROGram(s) IV Push at bedtime  meropenem  IVPB 1000 milliGRAM(s) IV Intermittent every 12 hours  pantoprazole  Injectable 40 milliGRAM(s) IV Push daily  perampanel 4 milliGRAM(s) Oral at bedtime  polyethylene glycol 3350 17 Gram(s) Oral daily  predniSONE  Solution 5 milliGRAM(s) Oral daily  senna Syrup 10 milliLiter(s) Oral daily  trimethoprim  40 mG/sulfamethoxazole 200 mG Suspension 80 milliGRAM(s) Oral <User Schedule>      LABS:  06-28    141  |  108  |  28<H>  ----------------------------<  143<H>  4.9   |  25  |  1.51<H>    Ca    8.3<L>      28 Jun 2022 04:10  Phos  3.0     06-28  Mg     2.2     06-28    TPro  5.7<L>  /  Alb  2.3<L>  /  TBili  0.2  /  DBili      /  AST  14  /  ALT  9<L>  /  AlkPhos  93  06-28    Creatinine Trend: 1.51 <--, 1.64 <--, 1.74 <--, 1.89 <--, 2.11 <--, 1.96 <--, 1.73 <--, 1.75 <--    Albumin, Serum: 2.3 g/dL (06-28 @ 04:10)  Phosphorus Level, Serum: 3.0 mg/dL (06-28 @ 04:10)                              8.5    5.18  )-----------( 446      ( 28 Jun 2022 04:10 )             27.1     Urine Studies:  Urinalysis - [06-26-22 @ 06:25]      Color Light Yellow / Appearance Clear / SG 1.015 / pH 7.0      Gluc Negative / Ketone Negative  / Bili Negative / Urobili Negative       Blood Negative / Protein Trace / Leuk Est Negative / Nitrite Negative      RBC 3 / WBC 1 / Hyaline 0 / Gran  / Sq Epi  / Non Sq Epi 1 / Bacteria Negative    Urine Creatinine 52      [06-25-22 @ 14:10]  Urine Protein 24      [06-25-22 @ 14:10]  Urine Sodium 68      [06-25-22 @ 14:10]  Urine Osmolality 375      [06-25-22 @ 14:10]    Iron 17, TIBC 171, %sat 10      [05-02-22 @ 13:03]  Ferritin 1505      [05-02-22 @ 13:05]  PTH -- (Ca 9.2)      [02-05-22 @ 10:13]   294  HbA1c 6.0      [02-21-20 @ 08:53]      Syphilis Screen (Treponema Pallidum Ab) Negative      [06-27-22 @ 02:00]    RADIOLOGY & ADDITIONAL STUDIES:

## 2022-06-28 NOTE — AIRWAY REMOVAL NOTE  ADULT & PEDS - RESPIRATORY EXPANSION/ACCESSORY MUSCLES/RETRACTIONS
no use of accessory muscles/no retractions
no use of accessory muscles
no use of accessory muscles/no retractions

## 2022-06-28 NOTE — PROGRESS NOTE ADULT - PROBLEM SELECTOR PLAN 2
S/p Thymo induction. Stopped CNI as MRI concerning for PRES and increased seizure activity. Belatacept held yesterday. C/w prednisone 5mg daily. MMF held for leukopenia. Valcyte held. C/w Fluconazole. C/w Nystatin. C/w bactrim. Glucose now better controlled. Toxo negative. CMV negative. F/u HHSV 6, Nocardia and Cryptosporidium. 1 BCx. positive for Staph Epi, likely contaminant.      If you have any questions, please feel free to contact me  Ant Pantoja  Nephrology Fellow  [phone]; Prefer [employer] TEAMS  (After 5pm or on weekends please page the on-call fellow).

## 2022-06-28 NOTE — PROGRESS NOTE ADULT - SUBJECTIVE AND OBJECTIVE BOX
Transplant Surgery Multidisciplinary Progress Note   --------------------------------------------------------------  R DCD DDRT    4/21/22    Present: Patient seen and examined with multidisciplinary team including Transplant Surgeon: Dr. Barber,  Nephrologist: Dr. Alfred/Kit, Pharmacist: WALDO Lim and unit RN during am rounds.  Disciplines not in attendance will be notified of the plan.     HPI: 66M with PMH: DM type II, HTN, CAD s/p CABG in 2020, AFib on Eliquis, CVA in 2019 due to Afib, Seizure d/o following CVA, last episode was on 4/8/22, h/o GIB in 2/2022 EGD with duodenal ulcer.  ESRD due to DM was on HD since 2019.     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  Donor was 58 , KDPI 82%, DCD, single vessels and ureter, HLA mismatch 1, 2, 2. No DSA, cPRA 0%. 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 Re-admitted in May for anemia in the setting of large perinephric hematoma s/p evacuation and repair of arterial anastomosis on 5/2/22. Intra operative biopsy showed no rejection, Creatinine ranging ~2mg/dL.    In Rehab: He was recently dx with klebsiella UTI rx with ertapenem - then switched to Levaquin day #6.    He also had parainfluenza pneumonia. Was at rehab progressing well.      Hospital course:  - 6/10: transferred from rehab facility for seizure status epilepticus. Intubated for respiratory protection and transferred to MICU.  EEG showed no seizure activity. Neuro consulted.   - 6/11: Extubated. Found to have R pleural effusion. s/p Thoracentesis 1.3L. Eliquis changed to heparin drip.  Post procedure drop in H&H. 5u PRBC, 2 u FFP.    - 6/11 evening: Transferred to SICU for further care in setting of hypoxia, significant R pleural effusion, hgb 6 and hemodynamic instability  - 6/11 Intubated at 9pm and sedated on Precedex.  CT placed, 600ml blood drained.  1L total overnight, started pressors  - 6/11 Received Protamine for PTT >100 (was on Heparin drip started for AF), 2 u FFP and 5u PRBC total  - 6/13 s/p VATS 2.2L old blood removed; second chest tube placed  - 6/18-19: chest tubes removed  - 6/21: ?PRES on MRI, off Envarsus, switched to Belatacept 6/23  - 6/25: Tx to SICU for refractory seizures    Interval Events:  -Afebrile on Andres/Fluc/Vanc by level  -starting to wake up  -intubated, low vent settings  -per EEG/Neuro team, no seizure activity for ~48H  -TFs at goal    Immunosuppression:   Induction:  Thymoglobulin                                        Maintenance immunosuppression:   -Belatacept #1 6/23, due for #2 on 6/27, but will continue to HOLD  -MMF remains on HOLD since 6/26,  continue Pred 5  -Ongoing monitoring for signs of rejection.    Potential Discharge date: pending clinical improvement  Education:  Medications  Plan of care:  See Below        MEDICATIONS  (STANDING):  amLODIPine   Tablet 10 milliGRAM(s) Oral <User Schedule>  atorvastatin 40 milliGRAM(s) Oral at bedtime  chlorhexidine 2% Cloths 1 Application(s) Topical <User Schedule>  epoetin cortney-epbx (RETACRIT) Injectable 23131 Unit(s) SubCutaneous every 7 days  fluconAZOLE IVPB 200 milliGRAM(s) IV Intermittent every 24 hours  fosphenytoin IVPB 100 milliGRAM(s) PE IV Intermittent every 8 hours  ganciclovir IVPB 155 milliGRAM(s) IV Intermittent once  heparin  Infusion 800 Unit(s)/Hr (12 mL/Hr) IV Continuous <Continuous>  hydrALAZINE 25 milliGRAM(s) Oral every 8 hours  insulin lispro (ADMELOG) corrective regimen sliding scale   SubCutaneous every 4 hours  levothyroxine Injectable 40 MICROGram(s) IV Push at bedtime  meropenem  IVPB 1000 milliGRAM(s) IV Intermittent every 12 hours  pantoprazole  Injectable 40 milliGRAM(s) IV Push daily  perampanel 4 milliGRAM(s) Oral at bedtime  polyethylene glycol 3350 17 Gram(s) Oral daily  predniSONE  Solution 5 milliGRAM(s) Oral daily  senna Syrup 10 milliLiter(s) Oral daily  trimethoprim  40 mG/sulfamethoxazole 200 mG Suspension 80 milliGRAM(s) Oral <User Schedule>    MEDICATIONS  (PRN):  HYDROmorphone  Injectable 0.5 milliGRAM(s) IV Push every 3 hours PRN Moderate Pain (4 - 6)        Vital Signs Last 24 Hrs  T(C): 36.8 (28 Jun 2022 11:00), Max: 37.4 (27 Jun 2022 20:00)  T(F): 98.2 (28 Jun 2022 11:00), Max: 99.3 (27 Jun 2022 20:00)  HR: 63 (28 Jun 2022 15:00) (53 - 71)  BP: 142/65 (27 Jun 2022 19:00) (142/65 - 142/65)  BP(mean): 93 (27 Jun 2022 19:00) (93 - 93)  RR: 20 (28 Jun 2022 15:00) (0 - 21)  SpO2: 99% (28 Jun 2022 15:00) (99% - 100%)    I&O's Summary    27 Jun 2022 07:01  -  28 Jun 2022 07:00  --------------------------------------------------------  IN: 1931.5 mL / OUT: 1445 mL / NET: 486.5 mL    28 Jun 2022 07:01  -  28 Jun 2022 15:30  --------------------------------------------------------  IN: 400 mL / OUT: 440 mL / NET: -40 mL                              8.5    5.18  )-----------( 446      ( 28 Jun 2022 04:10 )             27.1     06-28    141  |  108  |  28<H>  ----------------------------<  143<H>  4.9   |  25  |  1.51<H>    Ca    8.3<L>      28 Jun 2022 04:10  Phos  3.0     06-28  Mg     2.2     06-28    TPro  5.7<L>  /  Alb  2.3<L>  /  TBili  0.2  /  DBili  x   /  AST  14  /  ALT  9<L>  /  AlkPhos  93  06-28          Culture - Bronchial (collected 06-26-22 @ 18:08)  Source: Combi-Cath Combi-Cath  Gram Stain (06-27-22 @ 11:17):    Few polymorphonuclear leukocytes per low power field    Rare Squamous Epithelial Cells per low power field    Rare Gram Positive Rods per oil power field  Preliminary Report (06-28-22 @ 08:46):    Normal Respiratory Rachel present    Culture - Blood (collected 06-26-22 @ 09:01)  Source: .Blood Blood-Peripheral  Preliminary Report (06-27-22 @ 15:02):    No growth to date.    Culture - Blood (collected 06-26-22 @ 08:14)  Source: .Blood Blood-Peripheral  Gram Stain (06-27-22 @ 18:23):    Growth in anaerobic bottle: Gram positive cocci in pairs  Preliminary Report (06-27-22 @ 18:24):    Growth in anaerobic bottle: Gram positive cocci in pairs    ***Blood Panel PCR results on this specimen are available    approximately 3 hours after the Gram stain result.***    Gram stain, PCR, and/or culture results may not always    correspond due to difference in methodologies.    ************************************************************    This PCR assay was performed by multiplex PCR. This    Assay tests for 66 bacterial and resistance gene targets.    Please refer to the [facility] test directory    at [email or web address] for details.  Organism: Blood Culture PCR (06-28-22 @ 00:36)  Organism: Blood Culture PCR (06-28-22 @ 00:36)    Culture - Urine (collected 06-26-22 @ 06:25)  Source: Catheterized Catheterized  Final Report (06-27-22 @ 12:20):    No growth                    REVIEW OF SYSTEMS  --------------------------------------------------------------------------------  unable to assess, AMS, intubated, no sedation        PHYSICAL EXAM:  Constitutional: Well developed / well nourished  Eyes: Anicteric, PERRLA  ENMT: nc/at  Neck: supple  Respiratory: CTA   Cardiovascular: RRR  Gastrointestinal: Soft, non distended, NT, incision healed   Genitourinary: tucker   Extremities: SCD's in place and working bilaterally, RUE pitting edema, improving  Vascular: Palpable dp pulses bilaterally  Neurological: lethargic. intermittely following commands per SICU, no tremors noted, intubated  Skin: no rashes, ulcerations or lesions  Musculoskeletal: Moving all extremities, global weakness  Psychiatric: lethargic

## 2022-06-28 NOTE — PROGRESS NOTE ADULT - ASSESSMENT
Patient is a 65 y/o male w/ a PMHx of CAD s/p CABG (2020), AF on Eliquis c/b CVA (2019) c/b seizures, HTN, HLD, DM type II, hypothyroidism, GI bleed due to a duodenal ulcer (2/2022), and ESRD s/p DDRT (4/21/22) c/b DGF requiring HD (last session 4/29/22) & large perinephric hematoma s/p evacuation w/ revision of arterial anastomosis (5/2/22) who presented on 6/10 for status epilepticus. Course complicated by hemothorax, s/p VATS, now with recurrent refractory seizures secondary to tacrolimus toxicity  vs PRES syndrome vs meningitis and intubated for airway protection.      Neuro: Seizures with concern of status 2/2 PRES syndrome vs CNS infx  -MS: worsened since intubation but improved since yesterday. Pt now intermittently following commands.   -MRI showed encephalomalacia 2/2 htn encephalopathy vs ischemic changes. Unchanged from previous. No change in mngmt per Neuro.   - propofol d/darren 2/2 bradycardia  - ativan and vimpat d/darren 2/2 lethargy    - continue vEEG as per neuro. No documented seizure acitivty since 6/26  - LUE/LLE and left eye blinking around 1045pm tonight. No ezio seizure acitivty on veeg recording. Neuro made aware, will f/u further recs.  - antiepileptics: valproate dose decreased to 500mg q8, c/w Fycompa, and fosphenytoin   -will draw fosphenytoin level in the am  -dilaudid q3 PRN for pain control   - plan for LP on 6/28-6/30 as per Neuro     Resp: s/p VATS evacuation of hemothorax now intubated for airway protection  - chest tubes removed  - PRVC 450/16/40/5   -repeat cxr demonstrates unchanged small effusions and neg for infx process         CVS: Hypertension, Afib  - c/w amlodipine. Home coreg and nifedipine held in setting of bradycardia  - HR sinus salima    - started on hydral 10mg q8 for target systolic<150  - trops peaked 52-->61-->62. EKG shows sinus salima with LBBB unchanged from baseline.   - atorvastatin 40mg  - will cont to monitor   -lactate cleared      GI: TF  - TF jevity 1.5 goal 50cc/hr  - Bowel regimen with senna & Miralax  - continue home protonix    Renal: ESRD s/p DDRT (4/21/22) c/b DGF requiring HD (last session 4/29/22) & large perinephric hematoma s/p evacuation w/ revision of arterial anastomosis (5/2/22)   - ANA improving with good urine output   - tucker in place for strict I/Os   - replete electrolytes as needed   - continue prednisone 5mg daily and Retacrit; mycophenolic acid and Belatecept d/darren for possibility of sepsis as per transplant     Heme: RUE and RIJ DVT + A/C for afib  - transitioned from eliquis to heparin gtt for planned LP on 6/28-6/30  - heparin gtt theraputic  - Monitor CBC and coags   - SCD's    ID: ?sepsis 2/2 asp pna vs cns infx    - hypothermia and wbc improving   - bcx staph epidermidis in anerobic bottle. repeat bcx sent  - combivent and Ucx neg  - Vanc d/darren with neg mrsa swab   - CMV, EBV, syphillis neg   - Abx: Meropenem for hx of ESBL in urine, and fluconazole for empiric coverage   - ppx ABX: bactrim and Ganciclovir   - f/u HHV6, Parvovirus, Adenovirus PCR as per transplant  - plan for LP on 6/28    Endo: hx of DM, Hypothyroidism  - ISS qhs , lantus 4U and 4U premeal admelog  - for HLD : atorvastatin 40mg bedtime  - for hypothyroidism : levothyroxine

## 2022-06-29 NOTE — PROGRESS NOTE ADULT - ASSESSMENT
66 yr old Male with PMHx ESRD s/p permacath removal 5/10/22 s/p Rt DDRT 4/21/22,  CVA (2019), Afib on apixaban, seizure activity, CAD s/p stents, CABG (2020), HTN, HLD, DM on insulin, gastric/duodenal ulcer, recent hospitalization 4/28/22 -5/10/22 with weakness/anemia found to have perinephric hematoma requiring evacuation and repair of bleeding arterial anastomosis. Curently being treated for UTI with Levaquin Today after developing multiple sz episodes refractory to 5 mg versed IM (EMS) and 2 mg ativan IV in E.D. concerning for status epilepticus requiring intubation for hypoxic respiratory  failure. MICU Consult was called for hypoxic respiratory failure secondary to status epilepticus.  Nephrology consulted for renal failure.     A/P  DDRT on 04/21/22  Course complicated by DGF, was on HD until 4/29/22. Readmitted in May for anemia in the setting of large perinephric hematoma s/p evacuation and repair of arterial anastomosis on 5/2/22. Intra operative biopsy showed no rejection.  ANA likely sec to  hemodynamic change   scr improving today   optimize glucose   monitor BMP, U/O     Hyperkalemia   patient is on bactrim   s/p lokelma 10g 1dose   monitor K closely     HTN   monitor BP closely     Immunosuppression  continue per transplant team

## 2022-06-29 NOTE — PROGRESS NOTE ADULT - ASSESSMENT
66M with h/o DM type II, HTN, CAD s/p CABG in 2020, Afib on Eliquis, CVA in 2019 due to Afib, Seizure d/o (last episode was on 4/8/22), h/o GI bleed in 2/2022 EGD with duodenal ulcer and ESRD on HD s/p R DDRT from DCD donor on 4/21/22 complicated by DGF requiring HD until 4/29/22 now with good graft function who presented with status epilepticus in setting of UTI/antibiotics and sub-therapeutic valproic acid level     Seizure Disorder  - repeat MRI head 6/27 with less concerns for PRES, ischemic changes  - remains seizure free for 3D  - Antiepileptic regimen per Neurology, appreciate recs. please follow closely with team  - Keep Mag > 2  - hypothermia has resolved, Transplant ID following, appreciate recs  - S/p Ganciclovir 2.5mg/kg 6/27 and 6/28. Will hold Valcyte for now  - Continue Andres/Fluc/Vanc by level.    - 1 bottle BCx likely contaminant, repeat cxs negative  - full viral panel pending, studies negative so far  - given improvement in lethargy, will determine LP need tomorrow    R DCD DDRT (ureter stented) 4/21/22  - renal function stable  - Strict I/Os, tucker/OGT  - TFs to goal  - will remove stent prior to discharge    Immunosuppression:   - Belatacept #1 6/23.  next dose was due 6/27.  if no LP planned for tomorrow, will administer dose  - MMF on HOLD, will restart as able  - continue Pred 5  - PPx:  continue Bactrim MWF and PPI    DM  - on Lantus/Lispro, Endocrine following     AFib:  - continue Retacrit weekly  - Eliquis on hold pending possible LP   - rate controlled    R IJ DVT  - on hep gtt, Eliquis on HOLD    HTN:  - continue Norvasc 10 QD, increase Hydralazine to 75 TID. adjust as needed    Dispo  - continue excellent SICU care   66M with h/o DM type II, HTN, CAD s/p CABG in 2020, Afib on Eliquis, CVA in 2019 due to Afib, Seizure d/o (last episode was on 4/8/22), h/o GI bleed in 2/2022 EGD with duodenal ulcer and ESRD on HD s/p R DDRT from DCD donor on 4/21/22 complicated by DGF requiring HD until 4/29/22 now with good graft function who presented with status epilepticus in setting of UTI/antibiotics and sub-therapeutic valproic acid level     Seizure Disorder  - repeat MRI head 6/27 with less concerns for PRES, ischemic changes  - remains seizure free for 3D  - Antiepileptic regimen per Neurology, appreciate recs. please follow closely with team  - Keep Mag > 2  - hypothermia has resolved, Transplant ID following, appreciate recs  - S/p Ganciclovir 2.5mg/kg 6/27 and 6/28. Will hold Valcyte for now  - Continue Andres/Fluc/Vanc by level.    - 1 bottle BCx likely contaminant, repeat cxs negative  - full viral panel pending, studies negative so far  - given improvement in lethargy, will determine LP need tomorrow    R DCD DDRT (ureter stented) 4/21/22  - renal function stable  - Strict I/Os, d/c tucker as able  - TFs to goal  - will remove stent prior to discharge    Immunosuppression:   - Belatacept #1 6/23.  next dose was due 6/27.  if no LP planned for tomorrow, will administer dose  - MMF on HOLD, will restart as able  - continue Pred 5  - PPx:  continue Bactrim MWF and PPI    DM  - on Lantus/Lispro, Endocrine following     AFib:  - continue Retacrit weekly  - Eliquis on hold pending possible LP   - rate controlled    R IJ DVT  - on hep gtt, Eliquis on HOLD    HTN:  - continue Norvasc 10 QD, increase Hydralazine to 75 TID. adjust as needed    Dispo  - continue excellent SICU care

## 2022-06-29 NOTE — PROGRESS NOTE ADULT - PROBLEM SELECTOR PLAN 3
-C/w levothyroxine Injectable 40 MICROGram(s) IV Push at bedtime  -Once taking POs can restart LT4 50 mcg daily on an empty stomach at least 1 hour apart from from food or PO meds  - monitor TFTs outpatient.

## 2022-06-29 NOTE — PROGRESS NOTE ADULT - PROBLEM SELECTOR PLAN 1
-test BG q6h whil;e NPO/TFs  -C/w Admelog moderate correction scale but changed to q6h while on TFs instead of q4h  -Add NPH 2 units q 6h.  HOLD IF TFS ARE OFF  -Will adjust as needed  -Please inform endo team of any changes on steroid therapy or PO/TF status  Discharge  - Likely discharge on basal bolus insulin, Plan TBD based on insulin requirements at [facility].   Outpatient f/u with Endocrinologist Dr. Lincoln. [address] [facility] [address]. Phone  Needs apt at time of discharge  -Needs optho/renal/cardiac f/u as out pt  -Make sure pt has insulin sand DM supplies at time of discharge.

## 2022-06-29 NOTE — PROGRESS NOTE ADULT - ASSESSMENT
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 Creatinine trending down to 2-2.2mg/dL. H/o seizure d/o with last seizure episode on 4/8/22. Admitted for status epilepticus in the setting of sub therapeutic valproic acid levels. Had right thoracentesis done on 6/10 for effusion complicated by large hemothorax, hemorrhagic shock requiring PRBC x 5 units and vasopressors, chest tube placed. BP now stable on low dose phenylephrine. Now extubated and transferred to the floors.                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 Creatinine trending down to 2-2.2mg/dL. H/o seizure d/o with last seizure episode on 4/8/22. Admitted for status epilepticus in the setting of sub therapeutic valproic acid levels. Had right thoracentesis done on 6/10 for effusion complicated by large hemothorax, hemorrhagic shock requiring PRBC x 5 units and vasopressors, chest tube placed. BP now stable on low dose phenylephrine. Now extubated

## 2022-06-29 NOTE — PROGRESS NOTE ADULT - ASSESSMENT
66 yr old M with Type 2 DM> unknown control due to skewed A1C 6.6% due to chronic anemia and s/p blood tx. On basal/bolus insulin therapy PTA. DM c/b ESRD s/p DDRT on 3/21, CVA, CAD s/p CABG, Afib on apixaban, seizure activity, HTN, HLD, h/o GI bleed in 2/2022 EGD with duodenal ulcer, discharged to [facility] rehab center after prior hospitalization, now re-admitted from [facility] for seizures c/f status epilepticus in setting of UTI, requiring intubation for hypoxic respiratory failure and admission to MICU, ccb pleural effusion c/b bleeding s/p VATS> extubated and transferred to medicine floor. Patient was having 10-11 seizures per hour, transferred to SICU for monitoring. On 6/26, pt became less responsive with worsening lethargy and was subsequently intubated for airway protection. On 6/27, pt became hypothermic with low WBC and was started on empiric abx. Now pt clinical condition improving> extubated yesterday and tolerating TFs  with  BG levels  between 100s to 200s. Spoke to team again  to start NPH insulin low dose while on Glucerna at 50cc/hr.   BG goal (100-180mg/dl).

## 2022-06-29 NOTE — PROGRESS NOTE ADULT - SUBJECTIVE AND OBJECTIVE BOX
[facility] DIVISION OF KIDNEY DISEASES AND HYPERTENSION -- FOLLOW UP NOTE  --------------------------------------------------------------------------------  HPI: 66 yr old man with ESRD due to DM was on HD since 2019, s/p DCD DDRT on 4/21/22. Donor was 58 , KDPI 82%, DCD, single vessels and ureter, HLA mismatch 1, 2, 2. No DSA, cPRA 0%. 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 Readmitted in May for anemia in the setting of large perinephric hematoma s/p evacuation and repair of arterial anastomosis on 5/2/22. Intra operative biopsy showed no rejection, Creatinine ranging 2mg/dL. He was recently dx with klebsiella UTI rx with ertapenem - then switched to Levaquin day #6. He also had parainfluenza pneumonia. Was at rehab progressing well. Presented with status epilepticus. Intubated for respiratory protection. Right pleural effusion drained 1300ml. Transfused 1 unit PRBC. Pt. transferred to SICU and was transfused 4 additional units for a total of 6 units. Pt. re-transferred to ICU for NCSE. Intubated for airway protection on 6/25.    Pt. seen this AM. Extubated overnight. Remains confused. Reportedly without seizure activity for last 48 hours.     PAST HISTORY  --------------------------------------------------------------------------------  No significant changes to PMH, PSH, FHx, SHx, unless otherwise noted    ALLERGIES & MEDICATIONS  --------------------------------------------------------------------------------  Allergies    No Known Allergies    Intolerances      Standing Inpatient Medications  amLODIPine   Tablet 10 milliGRAM(s) Oral <User Schedule>  atorvastatin 40 milliGRAM(s) Oral at bedtime  chlorhexidine 2% Cloths 1 Application(s) Topical <User Schedule>  epoetin cortney-epbx (RETACRIT) Injectable 36193 Unit(s) SubCutaneous every 7 days  fluconAZOLE IVPB 200 milliGRAM(s) IV Intermittent every 24 hours  fosphenytoin IVPB 100 milliGRAM(s) PE IV Intermittent every 8 hours  heparin  Infusion 800 Unit(s)/Hr IV Continuous <Continuous>  hydrALAZINE 75 milliGRAM(s) Oral every 8 hours  insulin lispro (ADMELOG) corrective regimen sliding scale   SubCutaneous every 6 hours  insulin NPH human recombinant 2 Unit(s) SubCutaneous every 6 hours  levothyroxine Injectable 40 MICROGram(s) IV Push at bedtime  meropenem  IVPB 1000 milliGRAM(s) IV Intermittent every 12 hours  midazolam Injectable 2 milliGRAM(s) IV Push once  pantoprazole   Suspension 40 milliGRAM(s) Oral daily  perampanel 4 milliGRAM(s) Oral at bedtime  polyethylene glycol 3350 17 Gram(s) Oral daily  predniSONE  Solution 5 milliGRAM(s) Oral daily  senna Syrup 10 milliLiter(s) Oral daily  trimethoprim  40 mG/sulfamethoxazole 200 mG Suspension 80 milliGRAM(s) Oral <User Schedule>    PRN Inpatient Medications  HYDROmorphone  Injectable 0.5 milliGRAM(s) IV Push every 3 hours PRN      REVIEW OF SYSTEMS  --------------------------------------------------------------------------------  Unable to obtain    VITALS/PHYSICAL EXAM  --------------------------------------------------------------------------------  T(C): 36.7 (06-29-22 @ 11:00), Max: 36.9 (06-28-22 @ 23:00)  HR: 55 (06-29-22 @ 12:00) (55 - 70)  BP: --  RR: 10 (06-29-22 @ 12:00) (10 - 25)  SpO2: 99% (06-29-22 @ 12:00) (95% - 100%)  Wt(kg): --        06-28-22 @ 07:01  -  06-29-22 @ 07:00  --------------------------------------------------------  IN: 1878 mL / OUT: 2325 mL / NET: -447 mL    06-29-22 @ 07:01  -  06-29-22 @ 12:44  --------------------------------------------------------  IN: 860 mL / OUT: 190 mL / NET: 670 mL    Physical Exam:  	Gen: Intubated and sedated   	HEENT: MMM   	Pulm: CTAB anteriorly  	CV: S1S2+  	Abd: +BS, soft, non-tender          Transplant: No tenderness, swelling  	: No suprapubic tenderness, Miranda+  	MSK: no LE edema, some RUE edema.   	Psych: Sedated  	Skin: Warm          Access: Peripheral     LABS/STUDIES  --------------------------------------------------------------------------------              8.3    8.44  >-----------<  472      [06-29-22 @ 05:57]              26.8     138  |  103  |  27  ----------------------------<  201      [06-29-22 @ 05:57]  5.5   |  27  |  1.33        Ca     8.8     [06-29-22 @ 05:57]      Mg     1.8     [06-29-22 @ 05:57]      Phos  2.2     [06-29-22 @ 05:57]    TPro  6.2  /  Alb  2.7  /  TBili  0.2  /  DBili  x   /  AST  26  /  ALT  13  /  AlkPhos  134  [06-29-22 @ 05:57]    PT/INR: PT 15.9 , INR 1.38       [06-29-22 @ 05:57]  PTT: 88.5       [06-29-22 @ 05:57]      Creatinine Trend:  SCr 1.33 [06-29 @ 05:57]  SCr 1.51 [06-28 @ 04:10]  SCr 1.64 [06-27 @ 02:00]  SCr 1.74 [06-26 @ 18:14]  SCr 1.89 [06-26 @ 05:43]    Urinalysis - [06-26-22 @ 06:25]      Color Light Yellow / Appearance Clear / SG 1.015 / pH 7.0      Gluc Negative / Ketone Negative  / Bili Negative / Urobili Negative       Blood Negative / Protein Trace / Leuk Est Negative / Nitrite Negative      RBC 3 / WBC 1 / Hyaline 0 / Gran  / Sq Epi  / Non Sq Epi 1 / Bacteria Negative    Urine Creatinine 52      [06-25-22 @ 14:10]  Urine Protein 24      [06-25-22 @ 14:10]  Urine Sodium 68      [06-25-22 @ 14:10]  Urine Osmolality 375      [06-25-22 @ 14:10]    Iron 17, TIBC 171, %sat 10      [05-02-22 @ 13:03]  Ferritin 1505      [05-02-22 @ 13:05]  PTH -- (Ca 9.2)      [02-05-22 @ 10:13]   294  HbA1c 6.0      [02-21-20 @ 08:53]      Syphilis Screen (Treponema Pallidum Ab) Negative      [06-27-22 @ 02:00]     [facility] DIVISION OF KIDNEY DISEASES AND HYPERTENSION -- FOLLOW UP NOTE  --------------------------------------------------------------------------------  HPI: 66 yr old man with ESRD due to DM was on HD since 2019, s/p DCD DDRT on 4/21/22. Donor was 58 , KDPI 82%, DCD, single vessels and ureter, HLA mismatch 1, 2, 2. No DSA, cPRA 0%. 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 Readmitted in May for anemia in the setting of large perinephric hematoma s/p evacuation and repair of arterial anastomosis on 5/2/22. Intra operative biopsy showed no rejection, Creatinine ranging 2mg/dL. He was recently dx with klebsiella UTI rx with ertapenem - then switched to Levaquin day #6. He also had parainfluenza pneumonia. Was at rehab progressing well. Presented with status epilepticus. Intubated for respiratory protection. Right pleural effusion drained 1300ml. Transfused 1 unit PRBC. Pt. transferred to SICU and was transfused 4 additional units for a total of 6 units. Pt. re-transferred to ICU for NCSE. Intubated for airway protection on 6/25.    Pt. seen this AM. Extubated overnight. Remains confused. Reportedly without seizure activity for last 48 hours.     PAST HISTORY  --------------------------------------------------------------------------------  No significant changes to PMH, PSH, FHx, SHx, unless otherwise noted    ALLERGIES & MEDICATIONS  --------------------------------------------------------------------------------  Allergies    No Known Allergies    Intolerances      Standing Inpatient Medications  amLODIPine   Tablet 10 milliGRAM(s) Oral <User Schedule>  atorvastatin 40 milliGRAM(s) Oral at bedtime  chlorhexidine 2% Cloths 1 Application(s) Topical <User Schedule>  epoetin cortney-epbx (RETACRIT) Injectable 67379 Unit(s) SubCutaneous every 7 days  fluconAZOLE IVPB 200 milliGRAM(s) IV Intermittent every 24 hours  fosphenytoin IVPB 100 milliGRAM(s) PE IV Intermittent every 8 hours  heparin  Infusion 800 Unit(s)/Hr IV Continuous <Continuous>  hydrALAZINE 75 milliGRAM(s) Oral every 8 hours  insulin lispro (ADMELOG) corrective regimen sliding scale   SubCutaneous every 6 hours  insulin NPH human recombinant 2 Unit(s) SubCutaneous every 6 hours  levothyroxine Injectable 40 MICROGram(s) IV Push at bedtime  meropenem  IVPB 1000 milliGRAM(s) IV Intermittent every 12 hours  midazolam Injectable 2 milliGRAM(s) IV Push once  pantoprazole   Suspension 40 milliGRAM(s) Oral daily  perampanel 4 milliGRAM(s) Oral at bedtime  polyethylene glycol 3350 17 Gram(s) Oral daily  predniSONE  Solution 5 milliGRAM(s) Oral daily  senna Syrup 10 milliLiter(s) Oral daily  trimethoprim  40 mG/sulfamethoxazole 200 mG Suspension 80 milliGRAM(s) Oral <User Schedule>    PRN Inpatient Medications  HYDROmorphone  Injectable 0.5 milliGRAM(s) IV Push every 3 hours PRN      REVIEW OF SYSTEMS  --------------------------------------------------------------------------------  Unable to obtain    VITALS/PHYSICAL EXAM  --------------------------------------------------------------------------------  T(C): 36.7 (06-29-22 @ 11:00), Max: 36.9 (06-28-22 @ 23:00)  HR: 55 (06-29-22 @ 12:00) (55 - 70)  BP: --  RR: 10 (06-29-22 @ 12:00) (10 - 25)  SpO2: 99% (06-29-22 @ 12:00) (95% - 100%)  Wt(kg): --        06-28-22 @ 07:01  -  06-29-22 @ 07:00  --------------------------------------------------------  IN: 1878 mL / OUT: 2325 mL / NET: -447 mL    06-29-22 @ 07:01  -  06-29-22 @ 12:44  --------------------------------------------------------  IN: 860 mL / OUT: 190 mL / NET: 670 mL    Physical Exam:  	Gen: now extubated  	HEENT: MMM   	Pulm: CTAB anteriorly  	CV: S1S2+  	Abd: +BS, soft, non-tender          Transplant: No tenderness, swelling  	: No suprapubic tenderness, Miranda+  	MSK: no LE edema, some RUE edema.   	Psych/Neuro: Responds to some simple commands  	Skin: Warm          Access: Peripheral     LABS/STUDIES  --------------------------------------------------------------------------------              8.3    8.44  >-----------<  472      [06-29-22 @ 05:57]              26.8     138  |  103  |  27  ----------------------------<  201      [06-29-22 @ 05:57]  5.5   |  27  |  1.33        Ca     8.8     [06-29-22 @ 05:57]      Mg     1.8     [06-29-22 @ 05:57]      Phos  2.2     [06-29-22 @ 05:57]    TPro  6.2  /  Alb  2.7  /  TBili  0.2  /  DBili  x   /  AST  26  /  ALT  13  /  AlkPhos  134  [06-29-22 @ 05:57]    PT/INR: PT 15.9 , INR 1.38       [06-29-22 @ 05:57]  PTT: 88.5       [06-29-22 @ 05:57]      Creatinine Trend:  SCr 1.33 [06-29 @ 05:57]  SCr 1.51 [06-28 @ 04:10]  SCr 1.64 [06-27 @ 02:00]  SCr 1.74 [06-26 @ 18:14]  SCr 1.89 [06-26 @ 05:43]    Urinalysis - [06-26-22 @ 06:25]      Color Light Yellow / Appearance Clear / SG 1.015 / pH 7.0      Gluc Negative / Ketone Negative  / Bili Negative / Urobili Negative       Blood Negative / Protein Trace / Leuk Est Negative / Nitrite Negative      RBC 3 / WBC 1 / Hyaline 0 / Gran  / Sq Epi  / Non Sq Epi 1 / Bacteria Negative    Urine Creatinine 52      [06-25-22 @ 14:10]  Urine Protein 24      [06-25-22 @ 14:10]  Urine Sodium 68      [06-25-22 @ 14:10]  Urine Osmolality 375      [06-25-22 @ 14:10]    Iron 17, TIBC 171, %sat 10      [05-02-22 @ 13:03]  Ferritin 1505      [05-02-22 @ 13:05]  PTH -- (Ca 9.2)      [02-05-22 @ 10:13]   294  HbA1c 6.0      [02-21-20 @ 08:53]      Syphilis Screen (Treponema Pallidum Ab) Negative      [06-27-22 @ 02:00]

## 2022-06-29 NOTE — PROGRESS NOTE ADULT - PROBLEM SELECTOR PLAN 2
S/p Thymo induction. Stopped CNI as MRI concerning for PRES and increased seizure activity. Belatacept held until infection r/o. Will follow up with ID. C/w prednisone 5mg daily. MMF held for leukopenia. Valcyte held (s/p 2 doses of Valganciclovir). C/w Fluconazole. C/w Nystatin. C/w bactrim. Glucose now better controlled. Toxo, Nocardia and Cryptosporidium negative. CMV negative. F/u HHSV 6. 1 BCx. positive for Staph Epi, likely contaminant.      If you have any questions, please feel free to contact me  Ant Pantoja  Nephrology Fellow  [phone]; Prefer [employer] TEAMS  (After 5pm or on weekends please page the on-call fellow).

## 2022-06-29 NOTE — PROGRESS NOTE ADULT - SUBJECTIVE AND OBJECTIVE BOX
SICU Daily Progress Note  =====================================================  Interval/Overnight Events:     - Pt Extubated 6/28, tolerating Room Air  - Keofeed removed by patient, replaced and in good position to restart tube feeds  - Hypertensive with -190 , increased Hydralazine to 50mg q8 and s/p Hydralazine 10mg IVP x2    HPI:  Patient is a 65 y/o male w/ a PMHx of CAD s/p CABG (2020), AF on Eliquis c/b CVA (2019) c/b seizures, HTN, HLD, DM type II, hypothyroidism, GI bleed due to a duodenal ulcer (2/2022), and ESRD s/p DDRT (4/21/22) c/b DGF requiring HD (last session 4/29/22) & large perinephric hematoma s/p evacuation w/ revision of arterial anastomosis (5/2/22) who presented on 6/10/22 for status epilepticus. Patient was intubated for airway protection and transferred to MICU where pt loaded with valproic acid and continued home Keppra w/ resolution of his seizures on EEG, patient was extubated on 6/11. Course c/b a large right pleural effusion s/p thoracentesis performed on 67/11 with 1.3 L of serosanguineous fluid . Subsequently, pt started on a heparin infusion for his A. Fib post-procedure. Pt's H&H decreased and right lung looked whiteout on CXR concerning for a hemothorax. Patient was transferred to SICU for further management.    While in SICU, pt reintubated for airway protection, transfused with blood products, and reversed w/ protamine. A right 28 Fr chest tube was placed on 6/11 w/ a significant amount of sanguinous fluid drained. Patient went to OR on 6/15 with thoracic for Right VATS and a second chest tube placement. Patient recovered well and was transferred to the floor on 6/17 and chest tubes were removed 06/18. Hospital course further c/b 10-11 seizures per hour and transferred to SICU for monitoring. On 6/26, pt became less responsive with worsening lethargy and was intubated for a third time for airway protection. Pt's refractory seizures are likely 2/2 Tacrolimus toxicty with concern for PRES syndrome Pt without seizures for 48hrs, EEG removed, and pt extubated 6/28. Pt remains in SICU for further assessment and management.       Allergies: No Known Allergies      MEDICATIONS:   --------------------------------------------------------------------------------------  Neurologic Medications  fosphenytoin IVPB 100 milliGRAM(s) PE IV Intermittent every 8 hours  HYDROmorphone  Injectable 0.5 milliGRAM(s) IV Push every 3 hours PRN Moderate Pain (4 - 6)  perampanel 4 milliGRAM(s) Oral at bedtime    Respiratory Medications    Cardiovascular Medications  amLODIPine   Tablet 10 milliGRAM(s) Oral <User Schedule>  hydrALAZINE 50 milliGRAM(s) Oral every 8 hours    Gastrointestinal Medications  pantoprazole  Injectable 40 milliGRAM(s) IV Push daily  polyethylene glycol 3350 17 Gram(s) Oral daily  senna Syrup 10 milliLiter(s) Oral daily    Genitourinary Medications    Hematologic/Oncologic Medications  epoetin cortney-epbx (RETACRIT) Injectable 19933 Unit(s) SubCutaneous every 7 days  heparin  Infusion 800 Unit(s)/Hr IV Continuous <Continuous>    Antimicrobial/Immunologic Medications  fluconAZOLE IVPB 200 milliGRAM(s) IV Intermittent every 24 hours  meropenem  IVPB 1000 milliGRAM(s) IV Intermittent every 12 hours  trimethoprim  40 mG/sulfamethoxazole 200 mG Suspension 80 milliGRAM(s) Oral <User Schedule>    Endocrine/Metabolic Medications  atorvastatin 40 milliGRAM(s) Oral at bedtime  insulin lispro (ADMELOG) corrective regimen sliding scale   SubCutaneous every 4 hours  levothyroxine Injectable 40 MICROGram(s) IV Push at bedtime  predniSONE  Solution 5 milliGRAM(s) Oral daily    Topical/Other Medications  chlorhexidine 2% Cloths 1 Application(s) Topical <User Schedule>    --------------------------------------------------------------------------------------    VITAL SIGNS, INS/OUTS (last 24 hours):  --------------------------------------------------------------------------------------  T(C): 36.7 (06-29-22 @ 03:00), Max: 37 (06-28-22 @ 07:00)  HR: 62 (06-29-22 @ 04:00) (60 - 71)  BP: --  RR: 11 (06-29-22 @ 04:00) (10 - 25)  SpO2: 97% (06-29-22 @ 04:00) (96% - 100%)    06-27-22 @ 07:01  -  06-28-22 @ 07:00  --------------------------------------------------------  IN: 1931.5 mL / OUT: 1445 mL / NET: 486.5 mL    06-28-22 @ 07:01  -  06-29-22 @ 04:41  --------------------------------------------------------  IN: 1642 mL / OUT: 2185 mL / NET: -543 mL      --------------------------------------------------------------------------------------    EXAM  GENERAL:   NAD, well-groomed, well-developed  HEAD:    Atraumatic, Normocephalic  EYES:   3mm PERRLA, conjunctiva and sclera clear  ENMT:   No oropharyngeal exudates, erythema or lesions,  Moist mucous membranes  NECK:   Supple, no cervical lymphadenopathy, no JVD  NERVOUS SYSTEM:  Intermittently follows commands. Alert & Oriented X1 to name, CN II-XII intact, Moves all extremities  ; Right Upper extremity 1/5, Left Upper extremitiy 5/5; Lower extremities 5/5, full sensation to light touch   CHEST/LUNG:   Breath sounds bilaterally without crackles, rhonchi, wheezes, rubs.  HEART:   cardiac monitor NSR   ; S1/S2 without murmurs, without rubs, or gallops.  ABDOMEN:   Soft, Nontender, Nondistended; Bowel sounds present, Bladder non distended, non palpable  EXTREMITIES:   Pulses palpable radial pulses 2+ bilat, DP/PT 1+/1+ bilat, without clubbing, cyanosis. Digits warm to touch with good cap refill < 3 secs  SKIN:   warm, dry, intact, normal color, no rash or abnormal lesions, without palpable nodes      LABS  --------------------------------------------------------------------------------------                        8.5    5.18  )-----------( 446      ( 28 Jun 2022 04:10 )             27.1   06-28    141  |  108  |  28<H>  ----------------------------<  143<H>  4.9   |  25  |  1.51<H>    Ca    8.3<L>      28 Jun 2022 04:10  Phos  3.0     06-28  Mg     2.2     06-28    TPro  5.7<L>  /  Alb  2.3<L>  /  TBili  0.2  /  DBili  x   /  AST  14  /  ALT  9<L>  /  AlkPhos  93  06-28  ABG - ( 28 Jun 2022 16:35 )  pH, Arterial: 7.42  pH, Blood: x     /  pCO2: 43    /  pO2: 100   / HCO3: 28    / Base Excess: 3.1   /  SaO2: 99.0            PT/INR - ( 28 Jun 2022 04:10 )   PT: 16.3 sec;   INR: 1.40 ratio         PTT - ( 28 Jun 2022 16:39 )  PTT:84.2 sec  --------------------------------------------------------------------------------------

## 2022-06-29 NOTE — PROGRESS NOTE ADULT - NS ATTEND AMEND GEN_ALL_CORE FT
agree with above  increase mmf to 1g bid  continue excellent nsurg care/plan for OR tomorrow noted agree with above  hold off on LP as neurologically improving and no clear signs of sepsis

## 2022-06-29 NOTE — PROGRESS NOTE ADULT - ATTENDING COMMENTS
Patient seen and examined with DR Stallings    I agree with his interval history exam and plans as noted above    Patient remains with a waxing and waning mental status  He underwent LP with CSf studies not suggestive of infection to date    Continue IV ganciclovir pending CSF PCR  Most likely medication related- seizure meds. were stopped and if related mental status should slowly improve    Paddy Mcmanus MD  Can be called via Teams  After 5pm/weekends [phone]

## 2022-06-29 NOTE — PROGRESS NOTE ADULT - ASSESSMENT
is a 66 year old Male with PMHx ESRD s/p Rt DDRT 4/21/22,  CVA (2019), Afib on apixaban, seizure activity, CAD s/p stents, CABG (2020), HTN, HLD, DM on insulin, gastric/duodenal ulcer, recent hospitalization 4/28/22 -5/10/22 with weakness/anemia found to have perinephric hematoma requiring evacuation and repair of bleeding arterial anastomosis who presented to [facility] for status epilepticus requiring intubation for hypoxic respiratory failure s/p Intubation on 6/10. On 6/11 underwent thoracentesis in context of Eliquis therapy c/b bleeding into pleural space s/p chest tube placement. On 6/15 s/p uniportal R VATS, evacuation of hemothorax, pneumonolysis, decortication, intercostal nerve block. Pt was intermittently lethargic  with continued seizures. His MRI Brain (6/20) with some findings suggestive of  PRES. Pt underwent LP to exclude CNS infection on 6/29 with elevated OP of 29    WORKUP  s/p LP 6/29: OP 29mmHg, Glucose 125  Xray Chest (06.29.22): Right pleural thickening/effusion with patchy atelectasis at the right  lung base. Mild pulmonary vascular congestion   MR Head w/wo IV Cont (06.27):  Extensive bilateral frontal parietal gliosis and encephalomalacia with minimal enhancement   VA Duplex Upper Ext Right (06.24): Acute DVT is identified in the right internal jugular vein. Superficial vein thrombus is seen in the right cephalic and basilic veins.   6/26: Parvovirus PCR, Adenovirus PCR and CMV PCR, HHV 6 PCR and EBV PCR: negative  Blood cx (6/26): MRSE in 1/4 -> Blood cx (6/27): NGTD  Bronch cx and urine cx (6/26): Negative  MRI Brain (6/20) with some findings suggestive of  PRES  COVID19/FLU/RSV PCR (6/10) Negative  U/A (6/10) with 5 WBC and 25 RBC  Blood Cultures (6/10) NGTD  Pleural Fluid (6/11) with 71 nucleated cells, 50% PMN, 31% Lymph and Pleural Fluid Culture Negative   CT Chest (6/12) with Right-sided chest tube with large hemothorax and atelectasis of the right lower lobe.  UCx from 6/1 with >100K ESBL Klebsiella (Cipro R but Levofloxacin S) s/p 1-2 doses of Ertapenem and was then switched to Levofloxacin s/p Ertapenem 6/14 --> 6/18    DIAGNOSIS and IMPRESSION  # Seizures  # AMS (Possibly 2/2 PRES vs Seizure vs HTN changes)  # Rt IJV DVT and Rt arm Superficial Thrombophlebitis  # ANA/CKD  #  Rt Hemothorax s/p VATs (6/15)  # Renal Transplant Recipient :DDRT on 04/21/22    Afebrile with no leukocytosis  Positive Blood Culture (6/26 with 1/4 Staph epi): Favor procurement contaminant and repeat blood cx negative  LP with elevated OP, however Cell count, glucose and protein not suggestive of infection    RECOMMENDATIONS  Currently on Empiric Ganciclovir 2.5 mg/kg IV Q24H,  Vancomycin and Meropenem pending LP results  f/u CSF PCR, HSV PCR and CSF culture and other CSF studies  Continue Bactrim for PCP PPx  Pt was on Valcyte for CMV PPx which was switched to  Ganciclovir 2.5 mg/kg IV Q24H (6/26 - ) -> Can switch back to Valcyte if CSF PCR and HSV CSF PCR is negative  C/w fluconazole for ppx    PT TO BE SEEN. PRELIM NOTE  PENDING RECS. PLEASE WAIT FOR FINAL RECS AFTER DISCUSSION WITH ATTENDING#    Neo Stallings MD, PGY4   ID fellow  [employer] Teams Preferred  After 5pm/weekends call [phone]    is a 66 year old Male with PMHx ESRD s/p Rt DDRT 4/21/22,  CVA (2019), Afib on apixaban, seizure activity, CAD s/p stents, CABG (2020), HTN, HLD, DM on insulin, gastric/duodenal ulcer, recent hospitalization 4/28/22 -5/10/22 with weakness/anemia found to have perinephric hematoma requiring evacuation and repair of bleeding arterial anastomosis who presented to [facility] for status epilepticus requiring intubation for hypoxic respiratory failure s/p Intubation on 6/10. On 6/11 underwent thoracentesis in context of Eliquis therapy c/b bleeding into pleural space s/p chest tube placement. On 6/15 s/p uniportal R VATS, evacuation of hemothorax, pneumonolysis, decortication, intercostal nerve block. Pt was intermittently lethargic  with continued seizures. His MRI Brain (6/20) with some findings suggestive of  PRES. Pt underwent LP to exclude CNS infection on 6/29 with elevated OP of 29    WORKUP  s/p LP 6/29: OP 29mmHg, Glucose 125  Xray Chest (06.29.22): Right pleural thickening/effusion with patchy atelectasis at the right  lung base. Mild pulmonary vascular congestion   MR Head w/wo IV Cont (06.27):  Extensive bilateral frontal parietal gliosis and encephalomalacia with minimal enhancement   VA Duplex Upper Ext Right (06.24): Acute DVT is identified in the right internal jugular vein. Superficial vein thrombus is seen in the right cephalic and basilic veins.   6/26: Parvovirus PCR, Adenovirus PCR and CMV PCR, HHV 6 PCR and EBV PCR: negative  Blood cx (6/26): MRSE in 1/4 -> Blood cx (6/27): NGTD  Bronch cx and urine cx (6/26): Negative  MRI Brain (6/20) with some findings suggestive of  PRES  COVID19/FLU/RSV PCR (6/10) Negative  U/A (6/10) with 5 WBC and 25 RBC  Blood Cultures (6/10) NGTD  Pleural Fluid (6/11) with 71 nucleated cells, 50% PMN, 31% Lymph and Pleural Fluid Culture Negative   CT Chest (6/12) with Right-sided chest tube with large hemothorax and atelectasis of the right lower lobe.  UCx from 6/1 with >100K ESBL Klebsiella (Cipro R but Levofloxacin S) s/p 1-2 doses of Ertapenem and was then switched to Levofloxacin s/p Ertapenem 6/14 --> 6/18    DIAGNOSIS and IMPRESSION  # Seizures  # AMS (Possibly 2/2 PRES vs Seizure vs HTN changes)  # Rt IJV DVT and Rt arm Superficial Thrombophlebitis  # ANA/CKD  #  Rt Hemothorax s/p VATs (6/15)  # Renal Transplant Recipient :DDRT on 04/21/22    Afebrile with no leukocytosis  Positive Blood Culture (6/26 with 1/4 Staph epi): Favor procurement contaminant and repeat blood cx negative  LP with elevated OP, however Cell count, glucose and protein not suggestive of infection    RECOMMENDATIONS  Currently on Empiric Ganciclovir 2.5 mg/kg IV Q24H,  Vancomycin and Meropenem -> Can stop if CSF PCR is negative  f/u CSF PCR, HSV PCR and CSF culture and other CSF studies  Continue Bactrim for PCP PPx  Pt was on Valcyte for CMV PPx which was switched to  Ganciclovir 2.5 mg/kg IV Q24H (6/26 - ) -> Can switch back to Valcyte if CSF PCR and HSV CSF PCR is negative  C/w fluconazole for ppx    Pt seen and examined. Case d/w attending and primary team    Neo Stallings MD, PGY4   ID fellow  [employer] Teams Preferred  After 5pm/weekends call [phone]

## 2022-06-29 NOTE — PROGRESS NOTE ADULT - NSPROGADDITIONALINFOA_GEN_ALL_CORE
-Plan discussed with pt/team.  Contact info: [phone] (24/7). pager [phone]  [email or web address] password [email or web address]  Spent  27 minutes assessing pt/labs/meds and discussing plan of care with primary team and son  Adjusting insulin  Discharge plan  Follow up care

## 2022-06-29 NOTE — PROGRESS NOTE ADULT - PROBLEM SELECTOR PLAN 1
S/p DDRT on 4/21/22 Cr. stable in 2 range. ANA in the setting of hemodynamic instability 2/2 ATN. Donor was 58 , KDPI 82%, DCD, single vessels and ureter, HLA mismatch 1, 2, 2. No DSA, cPRA 0%. 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 Readmitted in May for anemia in the setting of large perinephric hematoma s/p evacuation and repair of arterial anastomosis on 5/2/22. Intra operative biopsy showed no rejection. As outpatient SCr. in th low 2 range. Admitted with lowest documented Cr. of 1.8 since transplant which has trended to 1.3 today. S/p Ertapenem for UTI + 1 dose on 6/22. Broad spectrum Abx resumed (On Vanc and MErrem). Received 1 dose of ganciclovir on 6/27. CXR reviewed. Optimize hemodynamics. Adequate UOP. C/w Amlodipine 10mg Daily for hypertension. Repeat MRI reviewed, largely unchanged showing chronic ischemic changes. Possibly for LP tomorrow if mental status worsens/ fails to improve.     C/w AED as per Neurology. Now on phenytoin and Peramparel. F/u Neurology. S/p DDRT on 4/21/22 Cr. stable in 2 range. ANA in the setting of hemodynamic instability 2/2 ATN. Donor was 58 , KDPI 82%, DCD, single vessels and ureter, HLA mismatch 1, 2, 2. No DSA, cPRA 0%. 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 Readmitted in May for anemia in the setting of large perinephric hematoma s/p evacuation and repair of arterial anastomosis on 5/2/22. Intra operative biopsy showed no rejection. As outpatient SCr. in th low 2 range. Admitted with lowest documented Cr. of 1.8 since transplant which has trended to 1.3 today. S/p Ertapenem for UTI + 1 dose on 6/22. Broad spectrum Abx resumed (On Vanc and Merrem). Received 1 dose of ganciclovir on 6/27. CXR reviewed. Optimize hemodynamics. Adequate UOP. C/w Amlodipine 10mg Daily for hypertension. Repeat MRI reviewed, largely unchanged showing chronic ischemic changes. Possibly for LP  if mental status worsens/ fails to improve.     C/w AED as per Neurology. Now on phenytoin and Peramparel. F/u Neurology.

## 2022-06-29 NOTE — PROGRESS NOTE ADULT - ASSESSMENT
Patient is a 65 y/o male w/ a PMHx of CAD s/p CABG (2020), AF on Eliquis c/b CVA (2019) c/b seizures, HTN, HLD, DM type II, hypothyroidism, GI bleed due to a duodenal ulcer (2/2022), and ESRD s/p DDRT (4/21/22) c/b DGF requiring HD (last session 4/29/22) & large perinephric hematoma s/p evacuation w/ revision of arterial anastomosis (5/2/22) who presented on 6/10 for status epilepticus. Course complicated by hemothorax, s/p VATS, now with recurrent refractory seizures secondary to tacrolimus toxicity  vs PRES syndrome vs meningitis and intubated for airway protection x3 during admission. Pt extubated 6/28 and remain sin SICU for further assessment and management.       Neuro: Seizures with concern of status epilepticus 2/2 PRES syndrome vs CNS infection   - Intermittently follows commands; A, O x1 to Name   - No seizures seen for 48 hours ( last document seizure 6/26); d/c EEG ( 6/28)   - Continue Antiepileptics Perampanel and fosphenytoin   - d/c Valproic Acid   - will draw Phenytoin level prior to 6/30 AM dose as per Neuro   - plan for LP on 6/28-6/30 as per Neuro   -MRI ( 6/27) showed encephalomalacia 2/2 htn encephalopathy vs ischemic changes. Unchanged from previous, no change in management per Neuro   -Pain Control with Dilaudid prn     Resp: s/p VATS evacuation of hemothorax now intubated for airway protection  - Extubated 6/28, tolerating RA; Maintain SpO2 >92%   - Chest tubes removed 6/18  - AM CXR to eval atelectasis     CVS: Hypertension, Afib; Bradycardia now resolved   - Continue Amlodipine  - Increased Hydralazine to 50mg q8 for goal systolic<150.   - Continue Atorvastatin 40mg    GI: TF  - Keofeed continue TF Jevity 1.5 goal 50cc/hr  - Bowel regimen with senna & Miralax  - Continue home Protonix    Renal: ESRD s/p DDRT (4/21/22) c/b DGF requiring HD (last session 4/29/22) & large perinephric hematoma s/p evacuation w/ revision of arterial anastomosis (5/2/22)   - ANA improving   - Miranda in place for strict I/Os   - s/p Lasix 20mg IV x1 ( 6/28)  - Continue prednisone 5mg daily and Retacrit; mycophenolic acid and Belatacept d/darren for possibility of sepsis as per transplant     Heme: RUE and RIJ DVT + A/C for afib  - Transitioned from eliquis to heparin gtt for planned LP on 6/28-6/30  - Monitor CBC and coags  - SCD's    ID: ?sepsis 2/2 asp pna vs cns infx    - Bcx ( 6/26) MRSE; f/u repeat BCx ( 6/27)   - Combi-Cath ( 6/26) negative, MRSA neg, UCx ( 6/26) neg   - Viral Workup: CMV, EBV, syphillis, Adenovirus, Parovirus neg ; f/u HHV6 as per transplant  - Abx: s/p Vanco ( 6/26-27) Meropenem for hx of ESBL in urine (while in Rehab) , and empiric fluconazole ( 6/27 - ?)  - Ppx Antibc: s/p Ganciclovir, continue Bactrim   - Plan for continued infectious workup with LP on 6/29    Endo: hx of DM, Hypothyroidism  - HbA1c 6.6 % ( 4/224)  - Monitor glucose, mISS q4 hr  - Continue levothyroxine    Status: Full Code  Dispo: SICU

## 2022-06-29 NOTE — PROGRESS NOTE ADULT - ASSESSMENT
Epilepsy, intractable, with status epilepticus  Encephalopathy  S/p renal transplant, ANA  Prior strokes  Atrial fibrillation  CAD  HTN  DM type 2    - Patient with multiple non-convulsive electrographic seizures that were initially refractory. These only presented as worsening mental status. He was also hypothermic with lower WBC. Seizures, leukocytosis, and hypothermia have since resolved on current therapy. Although CNS infection should be on the differential, especially given immunocompromised state with renal transplant, given low temperature and low WBC would favor more systemic process as opposed to primary CNS. Appreciate ID input and currently recommending antibiotic therapy  - vEEG without seizures, including LLE movements without EEG correlate. STOPPED  - Continue fosphenytoin maintenance with 100mg [id number]. Please check new phenytoin trough (30-60 minutes before dose) and LFT's on 6/30 AM. May adjust then depending on level tomorrow  - Continue Fycompa at 4mg PO bedtime  - Vimpat and VPA stopped. Due to sedation also stopped standing Ativan  - Agree with holding Eliquis for now given possible need for LP (recommend at least 3 days, preferably 5 days if not emergent need). From our standpoint this will likely not be needed due to improving clinical exam and no signs of CNS infection on repeat head imaging but defer to primary team. If done would send for cell count, total protein, glucose, Cx, BioFire panel, HSV 1/2 PCR, VZV Ab's, VDRL, oligoclonal bands, cytology  - Repeat MRI brain w/ and w/o without clear evidence of infection, inflammation, or acute CNS event (possibly minimal enhancement). 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

## 2022-06-29 NOTE — PROGRESS NOTE ADULT - SUBJECTIVE AND OBJECTIVE BOX
NEUROLOGY FOLLOW-UP CONSULT NOTE    RFC: Seizures    Interval history: Patient with resolved electrographic non-convulsive seizures. He has been extubated and vEEG stopped. Son, at bedside, reports mental status continues to fluctuate but no new focal neurologic deficits noted. No additional acute neurologic events overnight.    Meds:  MEDICATIONS  (STANDING):  amLODIPine   Tablet 10 milliGRAM(s) Oral <User Schedule>  atorvastatin 40 milliGRAM(s) Oral at bedtime  chlorhexidine 2% Cloths 1 Application(s) Topical <User Schedule>  epoetin cortney-epbx (RETACRIT) Injectable 76591 Unit(s) SubCutaneous every 7 days  fluconAZOLE IVPB 200 milliGRAM(s) IV Intermittent every 24 hours  fosphenytoin IVPB 100 milliGRAM(s) PE IV Intermittent every 8 hours  heparin  Infusion 800 Unit(s)/Hr (12 mL/Hr) IV Continuous <Continuous>  hydrALAZINE 75 milliGRAM(s) Oral every 8 hours  insulin lispro (ADMELOG) corrective regimen sliding scale   SubCutaneous every 6 hours  insulin NPH human recombinant 2 Unit(s) SubCutaneous every 6 hours  levothyroxine Injectable 40 MICROGram(s) IV Push at bedtime  meropenem  IVPB 1000 milliGRAM(s) IV Intermittent every 12 hours  midazolam Injectable 2 milliGRAM(s) IV Push once  pantoprazole   Suspension 40 milliGRAM(s) Oral daily  perampanel 4 milliGRAM(s) Oral at bedtime  polyethylene glycol 3350 17 Gram(s) Oral daily  predniSONE  Solution 5 milliGRAM(s) Oral daily  senna Syrup 10 milliLiter(s) Oral daily  trimethoprim  40 mG/sulfamethoxazole 200 mG Suspension 80 milliGRAM(s) Oral <User Schedule>    MEDICATIONS  (PRN):  HYDROmorphone  Injectable 0.5 milliGRAM(s) IV Push every 3 hours PRN Moderate Pain (4 - 6)    GTT:  None    PMHx/PSHx/FHx/SHx:  Acute respiratory failure with hypoxia    No pertinent family history in first degree relatives    No pertinent family history in first degree relatives    No pertinent family history in first degree relatives    FH: HTN (hypertension)    No pertinent family history in first degree relatives    Family history of cancer of tongue (Father)    Handoff    MEWS Score    Hypertension    Diabetes    Dyslipidemia    CAD (Coronary Artery Disease)    CAD (Coronary Artery Disease)    CRI (Chronic Renal Insufficiency)    Hypothyroidism    CVA (cerebral vascular accident)    Anemia    PEG (percutaneous endoscopic gastrostomy) status    Intubation of airway performed without difficulty    ESRD on dialysis    Seizures    Acute respiratory failure with hypoxia    Renal transplant recipient    Immunosuppressive management encounter following kidney transplant    Hemothorax    Hypothyroidism    Type 2 diabetes mellitus with hypoglycemia, with long-term current use of insulin    HTN (hypertension)    HLD (hyperlipidemia)    No significant past surgical history    History of insertion of stent into coronary artery bypass graft    S/P CABG x 3    SEIZURE    29    Convulsive status epilepticus    [email or web address]        Allergies:  No Known Allergies      ROS: Due to clinical condition unable to assess (PERRY)    O:  T(C): 36.7 (06-29-22 @ 11:00), Max: 36.9 (06-28-22 @ 23:00)  HR: 55 (06-29-22 @ 12:00) (55 - 70)  BP: --  RR: 10 (06-29-22 @ 12:00) (10 - 25)  SpO2: 99% (06-29-22 @ 12:00) (95% - 100%)    Focused neurologic exam:  MS - Obtunded, attends to all stimuli b/l, minimal speech output, does not follow commands. PERRY orientation, rep/naming, attn/conc, recent and remote memory, fund of knowledge  CN - PERRL, EOMI by OCR, (+) face sens/str by corneals b/l, (+) spontaneous respirations, (+) cough, VFF to threat b/l, hearing intact to conversation b/l, SCM at least 4+/5 b/l and symmetric. PERRY tongue/palate  Motor - Normal bulk. Inc tone in L side (paratonic?) and normal tone in R side. Spontaneous movements of L > R side. Grimaces, withdraws, and localizes to strong noxious in LUE 4+/5, LLE 4+/5, RUE 3+/5, RLE 2/5  Sens - LT/temp intact all, as above  DTR's - 2+ BUE's, 3+ R KJ, 2+ L KJ, 1+ AJ b/l, and downgoing b/l plantar response  Coord - PERRY  Gait and station - PERRY    Pertinent labs/studies:  PT/INR inc 15.9/1.38, PTT inc 88.5  CBC with low H/H 8/27 and MCV WNL, inc plt 472, otherwise essentially WNL  BMP with inc K 5.5, inc BUN/Cr 27/1.33 (GFR dec 59), otherwise essentially WNL  Mg WNL, Phos WNL  Albumin dec 2.7, LFT's with inc alk phos 134  NH3 WNL    Phenytoin level (6/28) - 20.1 (corrected 23.9)    vEEG 6/28 -  EEG SUMMARY/CLASSIFICATION    Abnormal EEG in an altered / a sedated patient.  - Rare right frontal and left frontal sharp waves  - Patient events as described above  - Left frontotemporal slowing  - Mild to moderate generalized slowing    _____________________________________________________________  EEG IMPRESSION/CLINICAL CORRELATE    Abnormal EEG study.  Event of left leg shaking was not epileptic.  Potential epileptogenic foci in the right frontal and left frontal regions.  Structural or functional abnormality in the left frontotemporal region.  Mild to moderate nonspecific diffuse or multifocal cerebral dysfunction.   No seizure seen.      MRI brain w/ and w/o 6/27 - Extensive bilateral frontal parietal gliosis and encephalomalacia with minimal enhancement which may be related to ischemic changes versus changes of hypertensive encephalopathy in a patient with renal transplant on immunosuppressants. No evidence of cerebritis or abscess.      < from: CT Head No Cont (06.24.22 @ 10:29) >    ACC: [id number] EXAM:  CT BRAIN                          PROCEDURE DATE:  06/24/2022      INTERPRETATION:  .    CLINICAL INFORMATION: Worsening lethargy. Altered mental status.    TECHNIQUE: Multiple axial CT images of the head were obtained without   contrast. Sagittal and coronal reconstructed images were acquired from   the source data.    COMPARISON: Prior head CT study from 6/21/2022. Prior brain MRI study   from 6/20/2022.    FINDINGS: Extensive encephalomalacia and gliosis are again noted within   the high bilateral cerebral hemispheres.. Overall appearance is unchanged.    There is no acute intracranial hemorrhage, mass effect, shift of the   midline structures, herniation, hydrocephalus, abnormal extra-axial fluid   collection.    There is diffuse cerebral volume loss with prominence of the sulci,   fissures, and cisternal spaces which is normal for the patient's age.   There is moderate patchy confluent periventricular white matter   hypoattenuation statistically compatible with microvascular changes given   calcific atherosclerotic disease of the intracranial arteries.    The paranasal sinuses and mastoid air cells are clear. The calvarium   appears intact. There is evidence of bilateral cataract removal.    IMPRESSION:No acute intracranial hemorrhage.    Similar-appearing extensive encephalomalacia and gliosis within the   bilateral high cerebral hemispheres.    Similar-appearing moderate severity chronic white matter microvascular   type changes.

## 2022-06-29 NOTE — PROGRESS NOTE ADULT - SUBJECTIVE AND OBJECTIVE BOX
Follow Up:      Interval History/ROS:Patient is a 66y old  Male who presents with a chief complaint of Status Epilepticus (29 Jun 2022 14:07)      REVIEW OF SYSTEMS  [  ] ROS unobtainable because:    [ x ] All other systems negative except as noted below    Constitutional:  [ ] fever [ ] chills  [ ] weight loss  [ ]night sweat  [ ]poor appetite/PO intake [ ]fatigue   Skin:  [ ] rash [ ] phlebitis	  Eyes: [ ] icterus [ ] pain  [ ] discharge	  ENMT: [ ] sore throat  [ ] thrush [ ] ulcers [ ] exudates [ ]anosmia  Respiratory: [ ] dyspnea [ ] hemoptysis [ ] cough [ ] sputum	  Cardiovascular:  [ ] chest pain [ ] palpitations [ ] edema	  Gastrointestinal:  [ ] nausea [ ] vomiting [ ] diarrhea [ ] constipation [ ] pain	  Genitourinary:  [ ] dysuria [ ] frequency [ ] hematuria [ ] discharge [ ] flank pain  [ ] incontinence  Musculoskeletal:  [ ] myalgias [ ] arthralgias [ ] arthritis  [ ] back pain  Neurological:  [ ] headache [ ] weakness [ ] seizures  [ ] confusion/altered mental status    Allergies  No Known Allergies        ANTIMICROBIALS:    fluconAZOLE IVPB 200 every 24 hours  meropenem  IVPB 1000 every 12 hours  trimethoprim  40 mG/sulfamethoxazole 200 mG Suspension 80 <User Schedule>        OTHER MEDS: MEDICATIONS  (STANDING):  amLODIPine   Tablet 10 <User Schedule>  atorvastatin 40 at bedtime  epoetin cortney-epbx (RETACRIT) Injectable 40601 every 7 days  fosphenytoin IVPB 100 every 8 hours  heparin  Infusion 800 <Continuous>  hydrALAZINE 75 every 8 hours  HYDROmorphone  Injectable 0.5 every 3 hours PRN  insulin lispro (ADMELOG) corrective regimen sliding scale  every 6 hours  insulin NPH human recombinant 2 every 6 hours  levothyroxine Injectable 40 at bedtime  pantoprazole   Suspension 40 daily  perampanel 4 at bedtime  polyethylene glycol 3350 17 daily  predniSONE  Solution 5 daily  senna Syrup 10 daily      Vital Signs Last 24 Hrs  T(F): 98.1 (06-29-22 @ 11:00), Max: 99.3 (06-27-22 @ 20:00)    Vital Signs Last 24 Hrs  HR: 57 (06-29-22 @ 13:00) (55 - 70)  BP: --  RR: 14 (06-29-22 @ 13:00)  SpO2: 100% (06-29-22 @ 13:00) (95% - 100%)  Wt(kg): --    EXAM:    GA: NAD  HEENT: oral cavity no lesion  CV: nl S1/S2, no RMG  Lungs: CTAB, no distress  Abd: BS+, soft, nontender, no rebounding pain  Ext: no edema  Neuro: No focal deficits  Skin: Intact  IV: no phlebitis    Labs:                        8.3    8.44  )-----------( 472      ( 29 Jun 2022 05:57 )             26.8     06-29    137  |  103  |  27<H>  ----------------------------<  203<H>  5.7<H>   |  26  |  1.31<H>    Ca    8.5      29 Jun 2022 12:24  Phos  3.5     06-29  Mg     2.2     06-29    TPro  6.2  /  Alb  2.7<L>  /  TBili  0.2  /  DBili  x   /  AST  26  /  ALT  13  /  AlkPhos  134<H>  06-29      WBC Trend:  WBC Count: 8.44 (06-29-22 @ 05:57)  WBC Count: 5.18 (06-28-22 @ 04:10)  WBC Count: 4.30 (06-27-22 @ 22:12)  WBC Count: 3.89 (06-27-22 @ 04:13)      Creatine Trend:  Creatinine, Serum: 1.31 (06-29)  Creatinine, Serum: 1.33 (06-29)  Creatinine, Serum: 1.51 (06-28)  Creatinine, Serum: 1.64 (06-27)      Liver Biochemical Testing Trend:  Alanine Aminotransferase (ALT/SGPT): 13 (06-29)  Alanine Aminotransferase (ALT/SGPT): 9 *L* (06-28)  Alanine Aminotransferase (ALT/SGPT): 6 *L* (06-27)  Alanine Aminotransferase (ALT/SGPT): 9 *L* (06-26)  Alanine Aminotransferase (ALT/SGPT): 10 (06-26)  Aspartate Aminotransferase (AST/SGOT): 26 (06-29-22 @ 05:57)  Aspartate Aminotransferase (AST/SGOT): 14 (06-28-22 @ 04:10)  Aspartate Aminotransferase (AST/SGOT): 13 (06-27-22 @ 02:00)  Aspartate Aminotransferase (AST/SGOT): 13 (06-26-22 @ 18:14)  Aspartate Aminotransferase (AST/SGOT): 19 (06-26-22 @ 05:43)  Bilirubin Total, Serum: 0.2 (06-29)  Bilirubin Total, Serum: 0.2 (06-28)  Bilirubin Total, Serum: 0.2 (06-27)  Bilirubin Total, Serum: 0.2 (06-26)  Bilirubin Total, Serum: 0.2 (06-26)      Trend LDH  06-11-22 @ 18:47  223  05-02-22 @ 10:05  246<H>  04-30-22 @ 07:15  260<H>  02-03-22 @ 18:58  179          MICROBIOLOGY:    MRSA PCR Result.: NotDetec (06-26-22 @ 11:25)      Culture - Nocardia (collected 28 Jun 2022 05:06)  Source: .Other Other  Preliminary Report:    Testing in progress    Culture - Blood (collected 27 Jun 2022 23:27)  Source: .Blood Blood-Peripheral  Preliminary Report:    No growth to date.    Culture - Blood (collected 27 Jun 2022 23:27)  Source: .Blood Blood-Peripheral  Preliminary Report:    No growth to date.    Culture - Bronchial (collected 26 Jun 2022 18:08)  Source: Combi-Cath Combi-Cath  Final Report:    Normal Respiratory Rachel present    Culture - Blood (collected 26 Jun 2022 09:01)  Source: .Blood Blood-Peripheral  Preliminary Report:    No growth to date.    Culture - Blood (collected 26 Jun 2022 08:14)  Source: .Blood Blood-Peripheral  Final Report:    Growth in anaerobic bottle: Staphylococcus epidermidis    Coag Negative Staphylococcus    Single set isolate, possible contaminant. Contact    Microbiology if susceptibility testing clinically    indicated.    ***Blood Panel PCR results on this specimen are available    approximately 3 hours after the Gram stain result.***    Gram stain, PCR, and/or culture results may not always    correspond due to difference in methodologies.    ************************************************************    This PCR assay was performed by multiplex PCR. This    Assay tests for 66 bacterial and resistance gene targets.    Please refer to the [facility] test directory    at [email or web address] for details.  Organism: Blood Culture PCR  Organism: Blood Culture PCR    Sensitivities:      -  Staphylococcus epidermidis, Methicillin resistant: Detec      Method Type: PCR    Culture - Urine (collected 26 Jun 2022 06:25)  Source: Catheterized Catheterized  Final Report:    No growth    Culture - Blood (collected 21 Jun 2022 09:02)  Source: .Blood Blood-Peripheral  Final Report:    No Growth Final    Culture - Blood (collected 19 Jun 2022 06:19)  Source: .Blood Blood  Final Report:    No Growth Final    Culture - Fungal, Body Fluid (collected 11 Jun 2022 02:42)  Source: .Body Fluid Pleural Fluid  Preliminary Report:    No growth      HIV-1/2 Combo Result: Nonreact (04-21-22 @ 14:47)  HIV-1/2 Combo Result: Nonreact (08-09-20 @ 19:26)        Toxoplasma IgM Interpretation: Negative (06-27-22 @ 15:57)  Treponema Pallidum Antibody Interpretation: Negative (06-27-22 @ 02:00)  Cytomegalovirus By PCR: NotDetec (06-26-22 @ 18:14)  Cytomegalovirus By PCR: NotDetec (06-25-22 @ 14:08)  Procalcitonin, Serum: 0.06 (06-26)      RADIOLOGY:  imaging below personally reviewed      < from: Xray Chest 1 View- PORTABLE-Routine (Xray Chest 1 View- PORTABLE-Routine in AM.) (06.29.22 @ 06:43) >  Right pleural thickening/effusion with patchy atelectasis at the right  lung base. Mild pulmonary vascular congestion   < end of copied text >    < from: MR Head w/wo IV Cont (06.27.22 @ 15:23) >  Extensive bilateral frontal parietal gliosis and encephalomalacia with minimal enhancement which may be related to  ischemic changes versus changes of hypertensive encephalopathy in a   patient with renal transplant on immunosuppressants. No evidence of  cerebritis or abscess.   < end of copied text >    < from: [facility] Duplex Upper Ext Vein Scan, Right (06.24.22 @ 14:19) >  Acute DVT is identified in the right internal jugular vein. Superficial vein thrombus is seen in the right cephalic and basilic veins.   < end of copied text >   Follow Up:  AMS    Interval History/ROS: Patient not responsive to commands. Responded to son speaking in [facility]. s/p LP today    REVIEW OF SYSTEMS  [  ] ROS unobtainable because:    [ x ] All other systems negative except as noted below    Constitutional:  [ ] fever [ ] chills  [ ] weight loss  [ ]night sweat  [ ]poor appetite/PO intake [ ]fatigue   Skin:  [ ] rash [ ] phlebitis	  Eyes: [ ] icterus [ ] pain  [ ] discharge	  ENMT: [ ] sore throat  [ ] thrush [ ] ulcers [ ] exudates [ ]anosmia  Respiratory: [ ] dyspnea [ ] hemoptysis [ ] cough [ ] sputum	  Cardiovascular:  [ ] chest pain [ ] palpitations [ ] edema	  Gastrointestinal:  [ ] nausea [ ] vomiting [ ] diarrhea [ ] constipation [ ] pain	  Genitourinary:  [ ] dysuria [ ] frequency [ ] hematuria [ ] discharge [ ] flank pain  [ ] incontinence  Musculoskeletal:  [ ] myalgias [ ] arthralgias [ ] arthritis  [ ] back pain  Neurological:  [ ] headache [ ] weakness [ ] seizures  [ ] confusion/altered mental status    Allergies  No Known Allergies        ANTIMICROBIALS:    fluconAZOLE IVPB 200 every 24 hours  meropenem  IVPB 1000 every 12 hours  trimethoprim  40 mG/sulfamethoxazole 200 mG Suspension 80 <User Schedule>        OTHER MEDS: MEDICATIONS  (STANDING):  amLODIPine   Tablet 10 <User Schedule>  atorvastatin 40 at bedtime  epoetin cortney-epbx (RETACRIT) Injectable 20368 every 7 days  fosphenytoin IVPB 100 every 8 hours  heparin  Infusion 800 <Continuous>  hydrALAZINE 75 every 8 hours  HYDROmorphone  Injectable 0.5 every 3 hours PRN  insulin lispro (ADMELOG) corrective regimen sliding scale  every 6 hours  insulin NPH human recombinant 2 every 6 hours  levothyroxine Injectable 40 at bedtime  pantoprazole   Suspension 40 daily  perampanel 4 at bedtime  polyethylene glycol 3350 17 daily  predniSONE  Solution 5 daily  senna Syrup 10 daily      Vital Signs Last 24 Hrs  T(F): 98.1 (06-29-22 @ 11:00), Max: 99.3 (06-27-22 @ 20:00)    Vital Signs Last 24 Hrs  HR: 57 (06-29-22 @ 13:00) (55 - 70)  BP: --  RR: 14 (06-29-22 @ 13:00)  SpO2: 100% (06-29-22 @ 13:00) (95% - 100%)  Wt(kg): --    EXAM:    GA: NAD  HEENT: oral cavity no lesion  CV: nl S1/S2, no RMG  Lungs: CTAB, no distress  Abd: BS+, soft, nontender, no rebounding pain  Ext: no edema  Neuro: No focal deficits, lethargic  Skin: Intact  IV: no phlebitis    Labs:                        8.3    8.44  )-----------( 472      ( 29 Jun 2022 05:57 )             26.8     06-29    137  |  103  |  27<H>  ----------------------------<  203<H>  5.7<H>   |  26  |  1.31<H>    Ca    8.5      29 Jun 2022 12:24  Phos  3.5     06-29  Mg     2.2     06-29    TPro  6.2  /  Alb  2.7<L>  /  TBili  0.2  /  DBili  x   /  AST  26  /  ALT  13  /  AlkPhos  134<H>  06-29      WBC Trend:  WBC Count: 8.44 (06-29-22 @ 05:57)  WBC Count: 5.18 (06-28-22 @ 04:10)  WBC Count: 4.30 (06-27-22 @ 22:12)  WBC Count: 3.89 (06-27-22 @ 04:13)      Creatine Trend:  Creatinine, Serum: 1.31 (06-29)  Creatinine, Serum: 1.33 (06-29)  Creatinine, Serum: 1.51 (06-28)  Creatinine, Serum: 1.64 (06-27)      Liver Biochemical Testing Trend:  Alanine Aminotransferase (ALT/SGPT): 13 (06-29)  Alanine Aminotransferase (ALT/SGPT): 9 *L* (06-28)  Alanine Aminotransferase (ALT/SGPT): 6 *L* (06-27)  Alanine Aminotransferase (ALT/SGPT): 9 *L* (06-26)  Alanine Aminotransferase (ALT/SGPT): 10 (06-26)  Aspartate Aminotransferase (AST/SGOT): 26 (06-29-22 @ 05:57)  Aspartate Aminotransferase (AST/SGOT): 14 (06-28-22 @ 04:10)  Aspartate Aminotransferase (AST/SGOT): 13 (06-27-22 @ 02:00)  Aspartate Aminotransferase (AST/SGOT): 13 (06-26-22 @ 18:14)  Aspartate Aminotransferase (AST/SGOT): 19 (06-26-22 @ 05:43)  Bilirubin Total, Serum: 0.2 (06-29)  Bilirubin Total, Serum: 0.2 (06-28)  Bilirubin Total, Serum: 0.2 (06-27)  Bilirubin Total, Serum: 0.2 (06-26)  Bilirubin Total, Serum: 0.2 (06-26)      Trend LDH  06-11-22 @ 18:47  223  05-02-22 @ 10:05  246<H>  04-30-22 @ 07:15  260<H>  02-03-22 @ 18:58  179          MICROBIOLOGY:    MRSA PCR Result.: NotDetec (06-26-22 @ 11:25)      Culture - Nocardia (collected 28 Jun 2022 05:06)  Source: .Other Other  Preliminary Report:    Testing in progress    Culture - Blood (collected 27 Jun 2022 23:27)  Source: .Blood Blood-Peripheral  Preliminary Report:    No growth to date.    Culture - Blood (collected 27 Jun 2022 23:27)  Source: .Blood Blood-Peripheral  Preliminary Report:    No growth to date.    Culture - Bronchial (collected 26 Jun 2022 18:08)  Source: Combi-Cath Combi-Cath  Final Report:    Normal Respiratory Rachel present    Culture - Blood (collected 26 Jun 2022 09:01)  Source: .Blood Blood-Peripheral  Preliminary Report:    No growth to date.    Culture - Blood (collected 26 Jun 2022 08:14)  Source: .Blood Blood-Peripheral  Final Report:    Growth in anaerobic bottle: Staphylococcus epidermidis    Coag Negative Staphylococcus    Single set isolate, possible contaminant. Contact    Microbiology if susceptibility testing clinically    indicated.    ***Blood Panel PCR results on this specimen are available    approximately 3 hours after the Gram stain result.***    Gram stain, PCR, and/or culture results may not always    correspond due to difference in methodologies.    ************************************************************    This PCR assay was performed by multiplex PCR. This    Assay tests for 66 bacterial and resistance gene targets.    Please refer to the [facility] test directory    at [email or web address] for details.  Organism: Blood Culture PCR  Organism: Blood Culture PCR    Sensitivities:      -  Staphylococcus epidermidis, Methicillin resistant: Detec      Method Type: PCR    Culture - Urine (collected 26 Jun 2022 06:25)  Source: Catheterized Catheterized  Final Report:    No growth    Culture - Blood (collected 21 Jun 2022 09:02)  Source: .Blood Blood-Peripheral  Final Report:    No Growth Final    Culture - Blood (collected 19 Jun 2022 06:19)  Source: .Blood Blood  Final Report:    No Growth Final    Culture - Fungal, Body Fluid (collected 11 Jun 2022 02:42)  Source: .Body Fluid Pleural Fluid  Preliminary Report:    No growth      HIV-1/2 Combo Result: Nonreact (04-21-22 @ 14:47)  HIV-1/2 Combo Result: Nonreact (08-09-20 @ 19:26)        Toxoplasma IgM Interpretation: Negative (06-27-22 @ 15:57)  Treponema Pallidum Antibody Interpretation: Negative (06-27-22 @ 02:00)  Cytomegalovirus By PCR: NotDetec (06-26-22 @ 18:14)  Cytomegalovirus By PCR: NotDetec (06-25-22 @ 14:08)  Procalcitonin, Serum: 0.06 (06-26)      RADIOLOGY:  imaging below personally reviewed      < from: Xray Chest 1 View- PORTABLE-Routine (Xray Chest 1 View- PORTABLE-Routine in AM.) (06.29.22 @ 06:43) >  Right pleural thickening/effusion with patchy atelectasis at the right  lung base. Mild pulmonary vascular congestion   < end of copied text >    < from: MR Head w/wo IV Cont (06.27.22 @ 15:23) >  Extensive bilateral frontal parietal gliosis and encephalomalacia with minimal enhancement which may be related to  ischemic changes versus changes of hypertensive encephalopathy in a   patient with renal transplant on immunosuppressants. No evidence of  cerebritis or abscess.   < end of copied text >    < from: [facility] Duplex Upper Ext Vein Scan, Right (06.24.22 @ 14:19) >  Acute DVT is identified in the right internal jugular vein. Superficial vein thrombus is seen in the right cephalic and basilic veins.   < end of copied text >   Follow Up:  AMS    Interval History/ROS: Patient not responsive to commands. Responded to son speaking in [facility]. s/p LP today    REVIEW OF SYSTEMS  [  ] ROS unobtainable because:    [ x ] All other systems negative except as noted below    Constitutional:  [ ] fever [ ] chills  [ ] weight loss  [ ]night sweat  [ ]poor appetite/PO intake [ ]fatigue   Skin:  [ ] rash [ ] phlebitis	  Eyes: [ ] icterus [ ] pain  [ ] discharge	  ENMT: [ ] sore throat  [ ] thrush [ ] ulcers [ ] exudates [ ]anosmia  Respiratory: [ ] dyspnea [ ] hemoptysis [ ] cough [ ] sputum	  Cardiovascular:  [ ] chest pain [ ] palpitations [ ] edema	  Gastrointestinal:  [ ] nausea [ ] vomiting [ ] diarrhea [ ] constipation [ ] pain	  Genitourinary:  [ ] dysuria [ ] frequency [ ] hematuria [ ] discharge [ ] flank pain  [ ] incontinence  Musculoskeletal:  [ ] myalgias [ ] arthralgias [ ] arthritis  [ ] back pain  Neurological:  [ ] headache [ ] weakness [ ] seizures  [ ] confusion/altered mental status    Allergies  No Known Allergies        ANTIMICROBIALS:    fluconAZOLE IVPB 200 every 24 hours  meropenem  IVPB 1000 every 12 hours  trimethoprim  40 mG/sulfamethoxazole 200 mG Suspension 80 <User Schedule>        OTHER MEDS: MEDICATIONS  (STANDING):  amLODIPine   Tablet 10 <User Schedule>  atorvastatin 40 at bedtime  epoetin cortney-epbx (RETACRIT) Injectable 38254 every 7 days  fosphenytoin IVPB 100 every 8 hours  heparin  Infusion 800 <Continuous>  hydrALAZINE 75 every 8 hours  HYDROmorphone  Injectable 0.5 every 3 hours PRN  insulin lispro (ADMELOG) corrective regimen sliding scale  every 6 hours  insulin NPH human recombinant 2 every 6 hours  levothyroxine Injectable 40 at bedtime  pantoprazole   Suspension 40 daily  perampanel 4 at bedtime  polyethylene glycol 3350 17 daily  predniSONE  Solution 5 daily  senna Syrup 10 daily      Vital Signs Last 24 Hrs  T(F): 98.1 (06-29-22 @ 11:00), Max: 99.3 (06-27-22 @ 20:00)    Vital Signs Last 24 Hrs  HR: 57 (06-29-22 @ 13:00) (55 - 70)  BP: --  RR: 14 (06-29-22 @ 13:00)  SpO2: 100% (06-29-22 @ 13:00) (95% - 100%)  Wt(kg): --    EXAM:    GA: NAD  HEENT: oral cavity no lesion  CV: nl S1/S2, no RMG  Lungs: CTAB, no distress  Abd: BS+, soft, nontender, no rebounding pain  Ext: Rt arm edematous, left arm edematous and weeping clear liquid from phlebotomy sites  Neuro: No focal deficits, lethargic  Skin: Intact  IV: no phlebitis    Labs:                        8.3    8.44  )-----------( 472      ( 29 Jun 2022 05:57 )             26.8     06-29    137  |  103  |  27<H>  ----------------------------<  203<H>  5.7<H>   |  26  |  1.31<H>    Ca    8.5      29 Jun 2022 12:24  Phos  3.5     06-29  Mg     2.2     06-29    TPro  6.2  /  Alb  2.7<L>  /  TBili  0.2  /  DBili  x   /  AST  26  /  ALT  13  /  AlkPhos  134<H>  06-29      WBC Trend:  WBC Count: 8.44 (06-29-22 @ 05:57)  WBC Count: 5.18 (06-28-22 @ 04:10)  WBC Count: 4.30 (06-27-22 @ 22:12)  WBC Count: 3.89 (06-27-22 @ 04:13)      Creatine Trend:  Creatinine, Serum: 1.31 (06-29)  Creatinine, Serum: 1.33 (06-29)  Creatinine, Serum: 1.51 (06-28)  Creatinine, Serum: 1.64 (06-27)      Liver Biochemical Testing Trend:  Alanine Aminotransferase (ALT/SGPT): 13 (06-29)  Alanine Aminotransferase (ALT/SGPT): 9 *L* (06-28)  Alanine Aminotransferase (ALT/SGPT): 6 *L* (06-27)  Alanine Aminotransferase (ALT/SGPT): 9 *L* (06-26)  Alanine Aminotransferase (ALT/SGPT): 10 (06-26)  Aspartate Aminotransferase (AST/SGOT): 26 (06-29-22 @ 05:57)  Aspartate Aminotransferase (AST/SGOT): 14 (06-28-22 @ 04:10)  Aspartate Aminotransferase (AST/SGOT): 13 (06-27-22 @ 02:00)  Aspartate Aminotransferase (AST/SGOT): 13 (06-26-22 @ 18:14)  Aspartate Aminotransferase (AST/SGOT): 19 (06-26-22 @ 05:43)  Bilirubin Total, Serum: 0.2 (06-29)  Bilirubin Total, Serum: 0.2 (06-28)  Bilirubin Total, Serum: 0.2 (06-27)  Bilirubin Total, Serum: 0.2 (06-26)  Bilirubin Total, Serum: 0.2 (06-26)      Trend LDH  06-11-22 @ 18:47  223  05-02-22 @ 10:05  246<H>  04-30-22 @ 07:15  260<H>  02-03-22 @ 18:58  179          MICROBIOLOGY:    MRSA PCR Result.: NotDetec (06-26-22 @ 11:25)      Culture - Nocardia (collected 28 Jun 2022 05:06)  Source: .Other Other  Preliminary Report:    Testing in progress    Culture - Blood (collected 27 Jun 2022 23:27)  Source: .Blood Blood-Peripheral  Preliminary Report:    No growth to date.    Culture - Blood (collected 27 Jun 2022 23:27)  Source: .Blood Blood-Peripheral  Preliminary Report:    No growth to date.    Culture - Bronchial (collected 26 Jun 2022 18:08)  Source: Combi-Cath Combi-Cath  Final Report:    Normal Respiratory Rachel present    Culture - Blood (collected 26 Jun 2022 09:01)  Source: .Blood Blood-Peripheral  Preliminary Report:    No growth to date.    Culture - Blood (collected 26 Jun 2022 08:14)  Source: .Blood Blood-Peripheral  Final Report:    Growth in anaerobic bottle: Staphylococcus epidermidis    Coag Negative Staphylococcus    Single set isolate, possible contaminant. Contact    Microbiology if susceptibility testing clinically    indicated.    ***Blood Panel PCR results on this specimen are available    approximately 3 hours after the Gram stain result.***    Gram stain, PCR, and/or culture results may not always    correspond due to difference in methodologies.    ************************************************************    This PCR assay was performed by multiplex PCR. This    Assay tests for 66 bacterial and resistance gene targets.    Please refer to the [facility] test directory    at [email or web address] for details.  Organism: Blood Culture PCR  Organism: Blood Culture PCR    Sensitivities:      -  Staphylococcus epidermidis, Methicillin resistant: Detec      Method Type: PCR    Culture - Urine (collected 26 Jun 2022 06:25)  Source: Catheterized Catheterized  Final Report:    No growth    Culture - Blood (collected 21 Jun 2022 09:02)  Source: .Blood Blood-Peripheral  Final Report:    No Growth Final    Culture - Blood (collected 19 Jun 2022 06:19)  Source: .Blood Blood  Final Report:    No Growth Final    Culture - Fungal, Body Fluid (collected 11 Jun 2022 02:42)  Source: .Body Fluid Pleural Fluid  Preliminary Report:    No growth      HIV-1/2 Combo Result: Nonreact (04-21-22 @ 14:47)  HIV-1/2 Combo Result: Nonreact (08-09-20 @ 19:26)        Toxoplasma IgM Interpretation: Negative (06-27-22 @ 15:57)  Treponema Pallidum Antibody Interpretation: Negative (06-27-22 @ 02:00)  Cytomegalovirus By PCR: NotDetec (06-26-22 @ 18:14)  Cytomegalovirus By PCR: NotDetec (06-25-22 @ 14:08)  Procalcitonin, Serum: 0.06 (06-26)      RADIOLOGY:  imaging below personally reviewed      < from: Xray Chest 1 View- PORTABLE-Routine (Xray Chest 1 View- PORTABLE-Routine in AM.) (06.29.22 @ 06:43) >  Right pleural thickening/effusion with patchy atelectasis at the right  lung base. Mild pulmonary vascular congestion   < end of copied text >    < from: MR Head w/wo IV Cont (06.27.22 @ 15:23) >  Extensive bilateral frontal parietal gliosis and encephalomalacia with minimal enhancement which may be related to  ischemic changes versus changes of hypertensive encephalopathy in a   patient with renal transplant on immunosuppressants. No evidence of  cerebritis or abscess.   < end of copied text >    < from: VA Duplex Upper Ext Vein Scan, Right (06.24.22 @ 14:19) >  Acute DVT is identified in the right internal jugular vein. Superficial vein thrombus is seen in the right cephalic and basilic veins.   < end of copied text >

## 2022-06-29 NOTE — PROGRESS NOTE ADULT - SUBJECTIVE AND OBJECTIVE BOX
DIABETES FOLLOW UP NOTE: Saw pt earlier today    Chief Complaint: Endocrine consult requested for management of T2DM    INTERVAL HX: Saw pt this am in SICU extubated and tolerating  TFs of Glucerna at 50cc/hr. BG levels variable between 100s to 200s in the last 24 hours while on present insulin doses. No hypoglycemia. On Prednisone 5mg daily. Pt unable to answer to most questions but nods no when asked if he has pain.     Review of Systems:  General: As above  Unable      Allergies    No Known Allergies    Intolerances      MEDICATIONS:  atorvastatin 40 milliGRAM(s) Oral at bedtime  fluconAZOLE IVPB 200 milliGRAM(s) IV Intermittent every 24 hours  insulin lispro (ADMELOG) corrective regimen sliding scale   SubCutaneous every 6 hours  insulin NPH human recombinant 2 Unit(s) SubCutaneous every 6 hours  levothyroxine Injectable 40 MICROGram(s) IV Push at bedtime  meropenem  IVPB 1000 milliGRAM(s) IV Intermittent every 12 hours  predniSONE  Solution 5 milliGRAM(s) Oral daily  trimethoprim  40 mG/sulfamethoxazole 200 mG Suspension 80 milliGRAM(s) Oral <User Schedule>      PHYSICAL EXAM:  VITALS: T(C): 36.8 (06-29-22 @ 15:00)  T(F): 98.2 (06-29-22 @ 15:00), Max: 98.4 (06-28-22 @ 23:00)  HR: 60 (06-29-22 @ 16:00) (53 - 70)  BP: --  RR:  (10 - 29)  SpO2:  (95% - 100%)  Wt(kg): --  GENERAL: Male laying in bed in NAD.   Abdomen: Soft, nontender, non distended  Extremities: Warm   NEURO: Alert but not answering to most questions. Appears tired     LABS:  POCT Blood Glucose.: 122 mg/dL (06-29-22 @ 17:06)  POCT Blood Glucose.: 241 mg/dL (06-29-22 @ 10:35)  POCT Blood Glucose.: 176 mg/dL (06-29-22 @ 05:05)  POCT Blood Glucose.: 144 mg/dL (06-29-22 @ 02:14)  POCT Blood Glucose.: 199 mg/dL (06-28-22 @ 21:28)  POCT Blood Glucose.: 189 mg/dL (06-28-22 @ 17:38)  POCT Blood Glucose.: 127 mg/dL (06-28-22 @ 14:22)  POCT Blood Glucose.: 266 mg/dL (06-28-22 @ 10:30)  POCT Blood Glucose.: 96 mg/dL (06-28-22 @ 01:24)  POCT Blood Glucose.: 263 mg/dL (06-27-22 @ 22:05)  POCT Blood Glucose.: 237 mg/dL (06-27-22 @ 17:06)  POCT Blood Glucose.: 154 mg/dL (06-27-22 @ 11:37)  POCT Blood Glucose.: 124 mg/dL (06-27-22 @ 05:11)  POCT Blood Glucose.: 85 mg/dL (06-27-22 @ 01:54)  POCT Blood Glucose.: 84 mg/dL (06-26-22 @ 23:05)  POCT Blood Glucose.: 104 mg/dL (06-26-22 @ 20:16)  POCT Blood Glucose.: 68 mg/dL (06-26-22 @ 19:42)                            8.3    8.44  )-----------( 472      ( 29 Jun 2022 05:57 )             26.8       06-29    137  |  103  |  27<H>  ----------------------------<  203<H>  5.7<H>   |  26  |  1.31<H>    eGFR: 60    Ca    8.5      06-29  Mg     2.2     06-29  Phos  3.5     06-29    TPro  6.2  /  Alb  2.7<L>  /  TBili  0.2  /  DBili  x   /  AST  26  /  ALT  13  /  AlkPhos  134<H>  06-29    A1C with Estimated Average Glucose Result: 6.6 % (04-24-22 @ 17:12) skewed due to anemia of chronic disease        Estimated Average Glucose: 143 mg/dL (04-24-22 @ 17:12)

## 2022-06-29 NOTE — PROGRESS NOTE ADULT - SUBJECTIVE AND OBJECTIVE BOX
Transplant Surgery Multidisciplinary Progress Note   --------------------------------------------------------------  R DCD DDRT    4/21/22    Present: Patient seen and examined with multidisciplinary team including Transplant Surgeon: Dr. Barber,  Nephrologist: Dr. Alfred/Kit, Pharmacist: WALDO Lim and unit RN during am rounds.  Disciplines not in attendance will be notified of the plan.     HPI: 66M with PMH: DM type II, HTN, CAD s/p CABG in 2020, AFib on Eliquis, CVA in 2019 due to Afib, Seizure d/o following CVA, last episode was on 4/8/22, h/o GIB in 2/2022 EGD with duodenal ulcer.  ESRD due to DM was on HD since 2019.     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  Donor was 58 , KDPI 82%, DCD, single vessels and ureter, HLA mismatch 1, 2, 2. No DSA, cPRA 0%. 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 Re-admitted in May for anemia in the setting of large perinephric hematoma s/p evacuation and repair of arterial anastomosis on 5/2/22. Intra operative biopsy showed no rejection, Creatinine ranging ~2mg/dL.    In Rehab: He was recently dx with klebsiella UTI rx with ertapenem - then switched to Levaquin day #6.    He also had parainfluenza pneumonia. Was at rehab progressing well.      Hospital course:  - 6/10: transferred from rehab facility for seizure status epilepticus. Intubated for respiratory protection and transferred to MICU.  EEG showed no seizure activity. Neuro consulted.   - 6/11: Extubated. Found to have R pleural effusion. s/p Thoracentesis 1.3L. Eliquis changed to heparin drip.  Post procedure drop in H&H. 5u PRBC, 2 u FFP.    - 6/11 evening: Transferred to SICU for further care in setting of hypoxia, significant R pleural effusion, hgb 6 and hemodynamic instability  - 6/11 Intubated at 9pm and sedated on Precedex.  CT placed, 600ml blood drained.  1L total overnight, started pressors  - 6/11 Received Protamine for PTT >100 (was on Heparin drip started for AF), 2 u FFP and 5u PRBC total  - 6/13 s/p VATS 2.2L old blood removed; second chest tube placed  - 6/18-19: chest tubes removed  - 6/21: ?PRES on MRI, off Envarsus, switched to Belatacept 6/23  - 6/25: Tx to SICU for refractory seizures    Interval Events:  -Afebrile on Andres/Fluc/Vanc by level  -extubated yesterday  -AAOx1, delirious, intermittently following commands  -off EEG yesterday evening, seizure free for ~48H prior to removal  -TFs at goal, UO stable    Immunosuppression:   Induction:  Thymoglobulin                                        Maintenance:  -Belatacept #1 6/23, #2 dose TBD, but will continue to HOLD today  -MMF remains on HOLD since 6/26,  continue Pred 5  -Ongoing monitoring for signs of rejection.    Potential Discharge date: pending clinical improvement  Education:  Medications  Plan of care:  See Below      MEDICATIONS  (STANDING):  amLODIPine   Tablet 10 milliGRAM(s) Oral <User Schedule>  atorvastatin 40 milliGRAM(s) Oral at bedtime  chlorhexidine 2% Cloths 1 Application(s) Topical <User Schedule>  epoetin cortney-epbx (RETACRIT) Injectable 59783 Unit(s) SubCutaneous every 7 days  fluconAZOLE IVPB 200 milliGRAM(s) IV Intermittent every 24 hours  fosphenytoin IVPB 100 milliGRAM(s) PE IV Intermittent every 8 hours  heparin  Infusion 800 Unit(s)/Hr (12 mL/Hr) IV Continuous <Continuous>  hydrALAZINE 75 milliGRAM(s) Oral every 8 hours  insulin lispro (ADMELOG) corrective regimen sliding scale   SubCutaneous every 4 hours  levothyroxine Injectable 40 MICROGram(s) IV Push at bedtime  meropenem  IVPB 1000 milliGRAM(s) IV Intermittent every 12 hours  pantoprazole   Suspension 40 milliGRAM(s) Oral daily  perampanel 4 milliGRAM(s) Oral at bedtime  polyethylene glycol 3350 17 Gram(s) Oral daily  predniSONE  Solution 5 milliGRAM(s) Oral daily  senna Syrup 10 milliLiter(s) Oral daily  trimethoprim  40 mG/sulfamethoxazole 200 mG Suspension 80 milliGRAM(s) Oral <User Schedule>    MEDICATIONS  (PRN):  HYDROmorphone  Injectable 0.5 milliGRAM(s) IV Push every 3 hours PRN Moderate Pain (4 - 6)          Vital Signs Last 24 Hrs  T(C): 36 (29 Jun 2022 07:00), Max: 36.9 (28 Jun 2022 23:00)  T(F): 96.8 (29 Jun 2022 07:00), Max: 98.4 (28 Jun 2022 23:00)  HR: 58 (29 Jun 2022 10:00) (58 - 70)  BP: --  BP(mean): --  RR: 12 (29 Jun 2022 10:00) (10 - 25)  SpO2: 100% (29 Jun 2022 10:00) (95% - 100%)    I&O's Summary    28 Jun 2022 07:01  -  29 Jun 2022 07:00  --------------------------------------------------------  IN: 1878 mL / OUT: 2325 mL / NET: -447 mL    29 Jun 2022 07:01  -  29 Jun 2022 10:57  --------------------------------------------------------  IN: 305 mL / OUT: 90 mL / NET: 215 mL                              8.3    8.44  )-----------( 472      ( 29 Jun 2022 05:57 )             26.8     06-29    138  |  103  |  27<H>  ----------------------------<  201<H>  5.5<H>   |  27  |  1.33<H>    Ca    8.8      29 Jun 2022 05:57  Phos  2.2     06-29  Mg     1.8     06-29    TPro  6.2  /  Alb  2.7<L>  /  TBili  0.2  /  DBili  x   /  AST  26  /  ALT  13  /  AlkPhos  134<H>  06-29          Culture - Nocardia (collected 06-28-22 @ 05:06)  Source: .Other Other  Preliminary Report (06-29-22 @ 08:06):    Testing in progress    Culture - Blood (collected 06-27-22 @ 23:27)  Source: .Blood Blood-Peripheral  Preliminary Report (06-29-22 @ 04:02):    No growth to date.    Culture - Blood (collected 06-27-22 @ 23:27)  Source: .Blood Blood-Peripheral  Preliminary Report (06-29-22 @ 04:02):    No growth to date.    Culture - Bronchial (collected 06-26-22 @ 18:08)  Source: Combi-Cath Combi-Cath  Gram Stain (06-27-22 @ 11:17):    Few polymorphonuclear leukocytes per low power field    Rare Squamous Epithelial Cells per low power field    Rare Gram Positive Rods per oil power field  Final Report (06-29-22 @ 08:38):    Normal Respiratory Rachel present    Culture - Blood (collected 06-26-22 @ 09:01)  Source: .Blood Blood-Peripheral  Preliminary Report (06-27-22 @ 15:02):    No growth to date.    Culture - Blood (collected 06-26-22 @ 08:14)  Source: .Blood Blood-Peripheral  Gram Stain (06-27-22 @ 18:23):    Growth in anaerobic bottle: Gram positive cocci in pairs  Final Report (06-28-22 @ 18:26):    Growth in anaerobic bottle: Staphylococcus epidermidis    Coag Negative Staphylococcus    Single set isolate, possible contaminant. Contact    Microbiology if susceptibility testing clinically    indicated.    ***Blood Panel PCR results on this specimen are available    approximately 3 hours after the Gram stain result.***    Gram stain, PCR, and/or culture results may not always    correspond due to difference in methodologies.    ************************************************************    This PCR assay was performed by multiplex PCR. This    Assay tests for 66 bacterial and resistance gene targets.    Please refer to the [facility] [employer] test directory    at [email or web address] for details.  Organism: Blood Culture PCR (06-28-22 @ 18:26)  Organism: Blood Culture PCR (06-28-22 @ 18:26)    Culture - Urine (collected 06-26-22 @ 06:25)  Source: Catheterized Catheterized  Final Report (06-27-22 @ 12:20):    No growth                  REVIEW OF SYSTEMS  --------------------------------------------------------------------------------  unable to assess, +delirium        PHYSICAL EXAM:  Constitutional: Well developed / well nourished  Eyes: Anicteric, PERRLA  ENMT: nc/at  Neck: supple  Respiratory: CTA   Cardiovascular: RRR  Gastrointestinal: Soft, non distended, NT, incision healed   Genitourinary: tucker   Extremities: SCD's in place and working bilaterally, RUE pitting edema, improving  Vascular: Palpable dp pulses bilaterally  Neurological: more awake today, AAOx1, intermittently following commands, no tremors noted  Skin: no rashes, ulcerations or lesions  Musculoskeletal: Moving all extremities, global weakness  Psychiatric: calm, periods of mild agitation

## 2022-06-29 NOTE — PROGRESS NOTE ADULT - SUBJECTIVE AND OBJECTIVE BOX
[facility]   MD NINA MASON MD, DO SADIE RAMIREZ NP    TEL:  OFFICE: [phone]    From 5pm-7am Answering Service [phone]    -- RENAL FOLLOW UP NOTE ---Date of Service 06-29-22 @ 14:07    Patient is a 66y old  Male who presents with a chief complaint of Status Epilepticus (29 Jun 2022 13:13)      Patient seen and examined in ICU.     VITALS:  T(F): 98.1 (06-29-22 @ 11:00), Max: 98.4 (06-28-22 @ 23:00)  HR: 57 (06-29-22 @ 13:00)  BP: --  RR: 14 (06-29-22 @ 13:00)  SpO2: 100% (06-29-22 @ 13:00)  Wt(kg): --    06-28 @ 07:01  -  06-29 @ 07:00  --------------------------------------------------------  IN: 1878 mL / OUT: 2325 mL / NET: -447 mL    06-29 @ 07:01  -  06-29 @ 14:07  --------------------------------------------------------  IN: 860 mL / OUT: 190 mL / NET: 670 mL          PHYSICAL EXAM:  Constitutional: NAD  Neck: No JVD  Respiratory: CTAB, no wheezes, rales or rhonchi  Cardiovascular: S1, S2, RRR  Gastrointestinal: BS+, soft, NT/ND  Extremities: No peripheral edema    Hospital Medications:   MEDICATIONS  (STANDING):  amLODIPine   Tablet 10 milliGRAM(s) Oral <User Schedule>  atorvastatin 40 milliGRAM(s) Oral at bedtime  chlorhexidine 2% Cloths 1 Application(s) Topical <User Schedule>  epoetin cortney-epbx (RETACRIT) Injectable 48230 Unit(s) SubCutaneous every 7 days  fluconAZOLE IVPB 200 milliGRAM(s) IV Intermittent every 24 hours  fosphenytoin IVPB 100 milliGRAM(s) PE IV Intermittent every 8 hours  heparin  Infusion 800 Unit(s)/Hr (12 mL/Hr) IV Continuous <Continuous>  hydrALAZINE 75 milliGRAM(s) Oral every 8 hours  insulin lispro (ADMELOG) corrective regimen sliding scale   SubCutaneous every 6 hours  insulin NPH human recombinant 2 Unit(s) SubCutaneous every 6 hours  levothyroxine Injectable 40 MICROGram(s) IV Push at bedtime  meropenem  IVPB 1000 milliGRAM(s) IV Intermittent every 12 hours  midazolam Injectable 2 milliGRAM(s) IV Push once  pantoprazole   Suspension 40 milliGRAM(s) Oral daily  perampanel 4 milliGRAM(s) Oral at bedtime  polyethylene glycol 3350 17 Gram(s) Oral daily  predniSONE  Solution 5 milliGRAM(s) Oral daily  senna Syrup 10 milliLiter(s) Oral daily  trimethoprim  40 mG/sulfamethoxazole 200 mG Suspension 80 milliGRAM(s) Oral <User Schedule>      LABS:  06-29    137  |  103  |  27<H>  ----------------------------<  203<H>  5.7<H>   |  26  |  1.31<H>    Ca    8.5      29 Jun 2022 12:24  Phos  3.5     06-29  Mg     2.2     06-29    TPro  6.2  /  Alb  2.7<L>  /  TBili  0.2  /  DBili      /  AST  26  /  ALT  13  /  AlkPhos  134<H>  06-29    Creatinine Trend: 1.31 <--, 1.33 <--, 1.51 <--, 1.64 <--, 1.74 <--, 1.89 <--, 2.11 <--, 1.96 <--, 1.73 <--    Phosphorus Level, Serum: 3.5 mg/dL (06-29 @ 12:24)  Albumin, Serum: 2.7 g/dL (06-29 @ 05:57)  Phosphorus Level, Serum: 2.2 mg/dL (06-29 @ 05:57)                              8.3    8.44  )-----------( 472      ( 29 Jun 2022 05:57 )             26.8     Urine Studies:  Urinalysis - [06-26-22 @ 06:25]      Color Light Yellow / Appearance Clear / SG 1.015 / pH 7.0      Gluc Negative / Ketone Negative  / Bili Negative / Urobili Negative       Blood Negative / Protein Trace / Leuk Est Negative / Nitrite Negative      RBC 3 / WBC 1 / Hyaline 0 / Gran  / Sq Epi  / Non Sq Epi 1 / Bacteria Negative    Urine Creatinine 52      [06-25-22 @ 14:10]  Urine Protein 24      [06-25-22 @ 14:10]  Urine Sodium 68      [06-25-22 @ 14:10]  Urine Osmolality 375      [06-25-22 @ 14:10]    Iron 17, TIBC 171, %sat 10      [05-02-22 @ 13:03]  Ferritin 1505      [05-02-22 @ 13:05]  PTH -- (Ca 9.2)      [02-05-22 @ 10:13]   294  HbA1c 6.0      [02-21-20 @ 08:53]      Syphilis Screen (Treponema Pallidum Ab) Negative      [06-27-22 @ 02:00]    RADIOLOGY & ADDITIONAL STUDIES:

## 2022-06-29 NOTE — PROGRESS NOTE ADULT - CRITICAL CARE ATTENDING COMMENT
Dr. Martin (Attending Physician)  N - Status Epilepticus but no seizures on EEG x 48 hours so taken off yesterday. Continues to have AMS possible tacro toxicity will get LP today to eval for encephalitis.  P - extubated yesterday, poor mental status, good cough minimal secretions  C - HTN amlodipine will inc hydralazine will increase to 75 q8h  GI - glucerna at goal 50, senna miralax with 2 bms yest, switch home ppi to po   - ANA improving, given lasix 20 mg IV yesterday and negative 1 liter, will give another 20 mg IV today for goal negative 1 liter, slightly improved fluid in fissue on CXR today  H - h/h stable, holding hep gtt for LP today, received eliquis on 6/26 in am  ID - possible encephalitis on vanc/miguel and ganciclovir, ppx with bactrim and fluconazole  E - prednisone  PPx - ppi, hep gtt  Lines - left radial mustapha  Dispo - to remain in SICU no

## 2022-06-29 NOTE — PROGRESS NOTE ADULT - TIME BILLING
Kidney recipient with functioning allograft  Critically ill, intubated , in ICU  HTN, DM,  HLD, CVA, Seizures,  downtrending/stable creatinine  S/p VATS  S/p extubation; combination regimen for seizure management, Empiric antimicrobial coverage  Reviewed immunosuppression, management regimen for comorbidities  On modified immunosuppression, currently on Steroids  Noted improved control of seizures.  Suggestions:  Continue minimized immunosuppression  Discussed with SICU team and transplant team  Monitor neurologic status  Agree with increasing hydralazine to 75 mg/dose three times daily  Will follow  I was present during and reviewed clinical and lab data as well as assessment and plan as documented by the house staff as noted. Please contact if any additional questions with any change in clinical condition or on availability of any additional information or reports.

## 2022-06-30 NOTE — PROGRESS NOTE ADULT - ASSESSMENT
Patient is a 67 y/o male w/ a PMHx of CAD s/p CABG (2020), AF on Eliquis c/b CVA (2019) c/b seizures, HTN, HLD, DM type II, hypothyroidism, GI bleed due to a duodenal ulcer (2/2022), and ESRD s/p DDRT (4/21/22) c/b DGF requiring HD (last session 4/29/22) & large perinephric hematoma s/p evacuation w/ revision of arterial anastomosis (5/2/22) who presented on 6/10 for status epilepticus. Course complicated by hemothorax, s/p VATS, now with recurrent refractory seizures secondary to tacrolimus toxicity  vs PRES syndrome vs meningitis and intubated for airway protection x3 during admission. Pt extubated 6/28 and remain sin SICU for further assessment and management.       Neuro: Seizures with concern of status epilepticus 2/2 PRES syndrome vs CNS infection   - Intermittently follows commands; A, O x1 to Name   - No seizures seen for 48 hours ( last document seizure 6/26); d/c EEG ( 6/28)   - Continue Antiepileptics Perampanel and fosphenytoin ; d/c Valproic Acid   - will draw Phenytoin level prior to 6/30 AM dose as per Neuro   -MRI ( 6/27) showed encephalomalacia 2/2 htn encephalopathy vs ischemic changes. Unchanged from previous, no change in management per Neuro   -Pain Control with Dilaudid prn     Resp: s/p VATS evacuation of hemothorax now intubated for airway protection  - Extubated 6/28, tolerating RA; Maintain SpO2 >92%   - Chest tubes removed 6/18  - AM CXR to eval atelectasis     CVS: Hypertension, Afib; Bradycardia now resolved   - Continue Amlodipine  - Increased Hydralazine to 75mg q8 for goal systolic<150.   - Continue Atorvastatin 40mg    GI: TF  - Keofeed continue TF Jevity 1.5 goal 50cc/hr  - Bowel regimen with senna & Miralax  - Continue home Protonix    Renal: ESRD s/p DDRT (4/21/22) c/b DGF requiring HD (last session 4/29/22) & large perinephric hematoma s/p evacuation w/ revision of arterial anastomosis (5/2/22)   - ANA improving   - Miranda in place for strict I/Os   - Hyperkalemic, s/p Lokelma 10mg x2; start Lokeelma BID per Transplant   - Continue prednisone 5mg daily and Retacrit; mycophenolic acid and Belatacept d/darren for possibility of sepsis as per transplant     Heme: RUE and RIJ DVT + A/C for afib  - Continue Heparin gtt for AC, will f/u with Transplant team on transitioning to Eliquis now s/p LP   - Monitor CBC and coags  - SCD's    ID: ?sepsis 2/2 asp pna vs cns infx    - Bcx ( 6/26) MRSE; f/u repeat BCx ( 6/27)   - Combi-Cath ( 6/26) negative, MRSA neg, UCx ( 6/26) neg   - Viral Workup: CMV, EBV, syphillis, Adenovirus, Parovirus neg ; f/u HHV6 as per transplant  - s/p LP (6/29) negative   - Abx: s/p Vanco ( 6/26-27), s/p Meropenem for hx of ESBL in urine (6/29-6/29) ; continue empiric fluconazole ( 6/27 - ?)  - Ppx Antibc: s/p Ganciclovir, continue Bactrim and Valcyte  - F/u G6PD level     Endo: hx of DM, Hypothyroidism  - HbA1c 6.6 % ( 4/224)  - Monitor glucose, mISS q4 hr  - Continue levothyroxine    Status: Full Code  Dispo: SICU

## 2022-06-30 NOTE — PROGRESS NOTE ADULT - SUBJECTIVE AND OBJECTIVE BOX
Health system DIVISION OF KIDNEY DISEASES AND HYPERTENSION -- FOLLOW UP NOTE  --------------------------------------------------------------------------------  HPI: 66 yr old man with ESRD due to DM was on HD since 2019, s/p DCD DDRT on 4/21/22. Donor was 58 , KDPI 82%, DCD, single vessels and ureter, HLA mismatch 1, 2, 2. No DSA, cPRA 0%. 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 Readmitted in May for anemia in the setting of large perinephric hematoma s/p evacuation and repair of arterial anastomosis on 5/2/22. Intra operative biopsy showed no rejection, Creatinine ranging 2mg/dL. He was recently dx with klebsiella UTI rx with ertapenem - then switched to Levaquin day #6. He also had parainfluenza pneumonia. Was at rehab progressing well. Presented with status epilepticus. Intubated for respiratory protection. Right pleural effusion drained 1300ml. Transfused 1 unit PRBC. Pt. transferred to SICU and was transfused 4 additional units for a total of 6 units. Pt. re-transferred to ICU for NCSE. Intubated for airway protection on 6/25.    Pt. seen this AM. Some improvement in mentation, oriented to person but more alert and able to answer questions with 1 one word answers. Hyperkalemic overnight was given multiple doses of Lokelma and Bactrim held.     PAST HISTORY  --------------------------------------------------------------------------------  No significant changes to PMH, PSH, FHx, SHx, unless otherwise noted    ALLERGIES & MEDICATIONS  --------------------------------------------------------------------------------  Allergies    No Known Allergies    Intolerances      Standing Inpatient Medications  amLODIPine   Tablet 10 milliGRAM(s) Oral <User Schedule>  apixaban 5 milliGRAM(s) Enteral Tube every 12 hours  atorvastatin 40 milliGRAM(s) Oral at bedtime  chlorhexidine 2% Cloths 1 Application(s) Topical <User Schedule>  epoetin cortney-epbx (RETACRIT) Injectable 22629 Unit(s) SubCutaneous every 7 days  fluconAZOLE IVPB 200 milliGRAM(s) IV Intermittent every 24 hours  fosphenytoin IVPB 100 milliGRAM(s) PE IV Intermittent every 8 hours  furosemide   Injectable 40 milliGRAM(s) IV Push once  hydrALAZINE 100 milliGRAM(s) Oral every 8 hours  insulin lispro (ADMELOG) corrective regimen sliding scale   SubCutaneous every 6 hours  insulin NPH human recombinant 2 Unit(s) SubCutaneous every 6 hours  levothyroxine Injectable 40 MICROGram(s) IV Push at bedtime  pantoprazole   Suspension 40 milliGRAM(s) Oral daily  perampanel 4 milliGRAM(s) Oral at bedtime  polyethylene glycol 3350 17 Gram(s) Oral daily  predniSONE  Solution 5 milliGRAM(s) Oral daily  senna Syrup 10 milliLiter(s) Oral daily  sodium zirconium cyclosilicate 10 Gram(s) Oral two times a day  trimethoprim  40 mG/sulfamethoxazole 200 mG Suspension 80 milliGRAM(s) Oral <User Schedule>  valGANciclovir 50 mG/mL Oral Solution 450 milliGRAM(s) Oral daily    PRN Inpatient Medications  HYDROmorphone  Injectable 0.5 milliGRAM(s) IV Push every 3 hours PRN      REVIEW OF SYSTEMS  --------------------------------------------------------------------------------  Unable to obtain.      VITALS/PHYSICAL EXAM  --------------------------------------------------------------------------------  T(C): 37 (06-30-22 @ 07:00), Max: 37 (06-30-22 @ 03:00)  HR: 59 (06-30-22 @ 10:00) (53 - 65)  BP: --  RR: 14 (06-30-22 @ 10:00) (10 - 29)  SpO2: 100% (06-30-22 @ 10:00) (95% - 100%)  Wt(kg): --        06-29-22 @ 07:01  -  06-30-22 @ 07:00  --------------------------------------------------------  IN: 2314 mL / OUT: 1380 mL / NET: 934 mL    06-30-22 @ 07:01  -  06-30-22 @ 10:29  --------------------------------------------------------  IN: 224 mL / OUT: 140 mL / NET: 84 mL    Physical Exam:  	Gen: NAD  	HEENT: MMM, NGT+  	Pulm: CTAB anteriorly  	CV: S1S2+  	Abd: +BS, soft, non-tender          Transplant: No tenderness, swelling  	: No suprapubic tenderness, Miranda+  	MSK: no LE edema, some UE edema, R>L  	Psych/Neuro: Responds to some simple commands  	Skin: Warm          Access: Peripheral     LABS/STUDIES  --------------------------------------------------------------------------------              7.5    6.75  >-----------<  393      [06-30-22 @ 00:15]              23.7     137  |  102  |  29  ----------------------------<  105      [06-30-22 @ 05:49]  5.5   |  29  |  1.44        Ca     8.6     [06-30-22 @ 05:49]      Mg     2.0     [06-30-22 @ 05:49]      Phos  3.2     [06-30-22 @ 05:49]    TPro  6.1  /  Alb  2.6  /  TBili  0.2  /  DBili  x   /  AST  21  /  ALT  12  /  AlkPhos  136  [06-30-22 @ 05:49]    PT/INR: PT 15.8 , INR 1.36       [06-30-22 @ 05:49]  PTT: 76.3       [06-30-22 @ 00:15]      Creatinine Trend:  SCr 1.44 [06-30 @ 05:49]  SCr 1.34 [06-30 @ 00:15]  SCr 1.32 [06-29 @ 18:06]  SCr 1.31 [06-29 @ 12:24]  SCr 1.33 [06-29 @ 05:57]    Urinalysis - [06-26-22 @ 06:25]      Color Light Yellow / Appearance Clear / SG 1.015 / pH 7.0      Gluc Negative / Ketone Negative  / Bili Negative / Urobili Negative       Blood Negative / Protein Trace / Leuk Est Negative / Nitrite Negative      RBC 3 / WBC 1 / Hyaline 0 / Gran  / Sq Epi  / Non Sq Epi 1 / Bacteria Negative    Urine Creatinine 52      [06-25-22 @ 14:10]  Urine Protein 24      [06-25-22 @ 14:10]  Urine Sodium 68      [06-25-22 @ 14:10]  Urine Osmolality 375      [06-25-22 @ 14:10]    Iron 17, TIBC 171, %sat 10      [05-02-22 @ 13:03]  Ferritin 1505      [05-02-22 @ 13:05]  PTH -- (Ca 9.2)      [02-05-22 @ 10:13]   294  HbA1c 6.0      [02-21-20 @ 08:53]      Syphilis Screen (Treponema Pallidum Ab) Negative      [06-27-22 @ 02:00]

## 2022-06-30 NOTE — PROGRESS NOTE ADULT - SUBJECTIVE AND OBJECTIVE BOX
Follow Up:  AMS    Interval History/ROS: LP yesterday not suggestive of infectious eitiology and antibacterials stopped.     REVIEW OF SYSTEMS  [  ] ROS unobtainable because:    [ x ] All other systems negative except as noted below    Constitutional:  [ ] fever [ ] chills  [ ] weight loss  [ ]night sweat  [ ]poor appetite/PO intake [ ]fatigue   Skin:  [ ] rash [ ] phlebitis	  Eyes: [ ] icterus [ ] pain  [ ] discharge	  ENMT: [ ] sore throat  [ ] thrush [ ] ulcers [ ] exudates [ ]anosmia  Respiratory: [ ] dyspnea [ ] hemoptysis [ ] cough [ ] sputum	  Cardiovascular:  [ ] chest pain [ ] palpitations [ ] edema	  Gastrointestinal:  [ ] nausea [ ] vomiting [ ] diarrhea [ ] constipation [ ] pain	  Genitourinary:  [ ] dysuria [ ] frequency [ ] hematuria [ ] discharge [ ] flank pain  [ ] incontinence  Musculoskeletal:  [ ] myalgias [ ] arthralgias [ ] arthritis  [ ] back pain  Neurological:  [ ] headache [ ] weakness [ ] seizures  [ ] confusion/altered mental status    Allergies  No Known Allergies        ANTIMICROBIALS:    fluconAZOLE IVPB 200 every 24 hours  valGANciclovir 50 mG/mL Oral Solution 450 daily        OTHER MEDS: MEDICATIONS  (STANDING):  amLODIPine   Tablet 10 <User Schedule>  atorvastatin 40 at bedtime  epoetin cortney-epbx (RETACRIT) Injectable 64739 every 7 days  fosphenytoin IVPB 100 every 8 hours  heparin  Infusion 800 <Continuous>  hydrALAZINE 100 every 8 hours  HYDROmorphone  Injectable 0.5 every 3 hours PRN  insulin lispro (ADMELOG) corrective regimen sliding scale  every 6 hours  insulin NPH human recombinant 2 every 6 hours  levothyroxine Injectable 40 at bedtime  pantoprazole   Suspension 40 daily  perampanel 4 at bedtime  polyethylene glycol 3350 17 daily  predniSONE  Solution 5 daily  senna Syrup 10 daily      Vital Signs Last 24 Hrs  T(F): 98.6 (06-30-22 @ 07:00), Max: 99.3 (06-27-22 @ 20:00)    Vital Signs Last 24 Hrs  HR: 65 (06-30-22 @ 09:00) (53 - 65)  BP: --  RR: 19 (06-30-22 @ 09:00)  SpO2: 98% (06-30-22 @ 09:00) (95% - 100%)  Wt(kg): --    EXAM:    GA: NAD  HEENT: oral cavity no lesion  CV: nl S1/S2, no RMG  Lungs: CTAB, no distress  Abd: BS+, soft, nontender, no rebounding pain  Ext: Rt arm edematous, left arm edematous and weeping clear liquid from phlebotomy sites  Neuro: No focal deficits, lethargic  Skin: Intact  IV: no phlebitis      Labs:                        7.5    6.75  )-----------( 393      ( 30 Jun 2022 00:15 )             23.7     06-30    137  |  102  |  29<H>  ----------------------------<  105<H>  5.5<H>   |  29  |  1.44<H>    Ca    8.6      30 Jun 2022 05:49  Phos  3.2     06-30  Mg     2.0     06-30    TPro  6.1  /  Alb  2.6<L>  /  TBili  0.2  /  DBili  x   /  AST  21  /  ALT  12  /  AlkPhos  136<H>  06-30      WBC Trend:  WBC Count: 6.75 (06-30-22 @ 00:15)  WBC Count: 8.44 (06-29-22 @ 05:57)  WBC Count: 5.18 (06-28-22 @ 04:10)  WBC Count: 4.30 (06-27-22 @ 22:12)      Creatine Trend:  Creatinine, Serum: 1.44 (06-30)  Creatinine, Serum: 1.34 (06-30)  Creatinine, Serum: 1.32 (06-29)  Creatinine, Serum: 1.31 (06-29)      Liver Biochemical Testing Trend:  Alanine Aminotransferase (ALT/SGPT): 12 (06-30)  Alanine Aminotransferase (ALT/SGPT): 13 (06-29)  Alanine Aminotransferase (ALT/SGPT): 9 *L* (06-28)  Alanine Aminotransferase (ALT/SGPT): 6 *L* (06-27)  Alanine Aminotransferase (ALT/SGPT): 9 *L* (06-26)  Aspartate Aminotransferase (AST/SGOT): 21 (06-30-22 @ 05:49)  Aspartate Aminotransferase (AST/SGOT): 26 (06-29-22 @ 05:57)  Aspartate Aminotransferase (AST/SGOT): 14 (06-28-22 @ 04:10)  Aspartate Aminotransferase (AST/SGOT): 13 (06-27-22 @ 02:00)  Aspartate Aminotransferase (AST/SGOT): 13 (06-26-22 @ 18:14)  Bilirubin Total, Serum: 0.2 (06-30)  Bilirubin Total, Serum: 0.2 (06-29)  Bilirubin Total, Serum: 0.2 (06-28)  Bilirubin Total, Serum: 0.2 (06-27)  Bilirubin Total, Serum: 0.2 (06-26)      Trend LDH  06-11-22 @ 18:47  223  05-02-22 @ 10:05  246<H>  04-30-22 @ 07:15  260<H>  02-03-22 @ 18:58  179          MICROBIOLOGY:    MRSA PCR Result.: NotDetec (06-26-22 @ 11:25)      Culture - CSF with Gram Stain (collected 29 Jun 2022 13:09)  Source: .CSF CSF    Culture - Acid Fast - CSF (collected 29 Jun 2022 13:09)  Source: .CSF CSF    Culture - Nocardia (collected 28 Jun 2022 05:06)  Source: .Other Other  Preliminary Report:    Testing in progress    Culture - Blood (collected 27 Jun 2022 23:27)  Source: .Blood Blood-Peripheral  Preliminary Report:    No growth to date.    Culture - Blood (collected 27 Jun 2022 23:27)  Source: .Blood Blood-Peripheral  Preliminary Report:    No growth to date.    Culture - Bronchial (collected 26 Jun 2022 18:08)  Source: Combi-Cath Combi-Cath  Final Report:    Normal Respiratory Rachel present    Culture - Blood (collected 26 Jun 2022 09:01)  Source: .Blood Blood-Peripheral  Preliminary Report:    No growth to date.    Culture - Blood (collected 26 Jun 2022 08:14)  Source: .Blood Blood-Peripheral  Final Report:    Growth in anaerobic bottle: Staphylococcus epidermidis    Coag Negative Staphylococcus    Single set isolate, possible contaminant. Contact    Microbiology if susceptibility testing clinically    indicated.    ***Blood Panel PCR results on this specimen are available    approximately 3 hours after the Gram stain result.***    Gram stain, PCR, and/or culture results may not always    correspond due to difference in methodologies.    ************************************************************    This PCR assay was performed by multiplex PCR. This    Assay tests for 66 bacterial and resistance gene targets.    Please refer to the [facility] test directory    at [email or web address] for details.  Organism: Blood Culture PCR  Organism: Blood Culture PCR    Sensitivities:      -  Staphylococcus epidermidis, Methicillin resistant: Detec      Method Type: PCR    Culture - Urine (collected 26 Jun 2022 06:25)  Source: Catheterized Catheterized  Final Report:    No growth    Culture - Blood (collected 21 Jun 2022 09:02)  Source: .Blood Blood-Peripheral  Final Report:    No Growth Final      HIV-1/2 Combo Result: Nonreact (04-21-22 @ 14:47)  HIV-1/2 Combo Result: Nonreact (08-09-20 @ 19:26)        Toxoplasma IgM Interpretation: Negative (06-27-22 @ 15:57)  Treponema Pallidum Antibody Interpretation: Negative (06-27-22 @ 02:00)  Cytomegalovirus By PCR: NotDetec (06-26-22 @ 18:14)  Cytomegalovirus By PCR: NotDetec (06-25-22 @ 14:08)    Procalcitonin, Serum: 0.06 (06-26)        RADIOLOGY:  imaging below personally reviewed      < from: Xray Chest 1 View- PORTABLE-Routine (Xray Chest 1 View- PORTABLE-Routine in AM.) (06.29.22 @ 06:43) >  Right pleural thickening/effusion with patchy atelectasis at the right lung base. Mild pulmonary vascular congestion   < end of copied text >         Follow Up:  AMS    Interval History/ROS: LP yesterday not suggestive of infectious eitiology and antibacterials stopped. Pt responding to voice and opening eyes, but not following commands    REVIEW OF SYSTEMS  [ x ] ROS unobtainable because:  lethargic  [  ] All other systems negative except as noted below    Constitutional:  [ ] fever [ ] chills  [ ] weight loss  [ ]night sweat  [ ]poor appetite/PO intake [ ]fatigue   Skin:  [ ] rash [ ] phlebitis	  Eyes: [ ] icterus [ ] pain  [ ] discharge	  ENMT: [ ] sore throat  [ ] thrush [ ] ulcers [ ] exudates [ ]anosmia  Respiratory: [ ] dyspnea [ ] hemoptysis [ ] cough [ ] sputum	  Cardiovascular:  [ ] chest pain [ ] palpitations [ ] edema	  Gastrointestinal:  [ ] nausea [ ] vomiting [ ] diarrhea [ ] constipation [ ] pain	  Genitourinary:  [ ] dysuria [ ] frequency [ ] hematuria [ ] discharge [ ] flank pain  [ ] incontinence  Musculoskeletal:  [ ] myalgias [ ] arthralgias [ ] arthritis  [ ] back pain  Neurological:  [ ] headache [ ] weakness [ ] seizures  [ ] confusion/altered mental status    Allergies  No Known Allergies        ANTIMICROBIALS:    fluconAZOLE IVPB 200 every 24 hours  valGANciclovir 50 mG/mL Oral Solution 450 daily        OTHER MEDS: MEDICATIONS  (STANDING):  amLODIPine   Tablet 10 <User Schedule>  atorvastatin 40 at bedtime  epoetin cortney-epbx (RETACRIT) Injectable 71421 every 7 days  fosphenytoin IVPB 100 every 8 hours  heparin  Infusion 800 <Continuous>  hydrALAZINE 100 every 8 hours  HYDROmorphone  Injectable 0.5 every 3 hours PRN  insulin lispro (ADMELOG) corrective regimen sliding scale  every 6 hours  insulin NPH human recombinant 2 every 6 hours  levothyroxine Injectable 40 at bedtime  pantoprazole   Suspension 40 daily  perampanel 4 at bedtime  polyethylene glycol 3350 17 daily  predniSONE  Solution 5 daily  senna Syrup 10 daily      Vital Signs Last 24 Hrs  T(F): 98.6 (06-30-22 @ 07:00), Max: 99.3 (06-27-22 @ 20:00)    Vital Signs Last 24 Hrs  HR: 65 (06-30-22 @ 09:00) (53 - 65)  BP: --  RR: 19 (06-30-22 @ 09:00)  SpO2: 98% (06-30-22 @ 09:00) (95% - 100%)  Wt(kg): --    EXAM:    GA: NAD  HEENT: oral cavity no lesion  CV: nl S1/S2, no RMG  Lungs: CTAB, no distress  Abd: BS+, soft, nontender, no rebounding pain  Ext: Rt arm edematous, left arm edematous and weeping clear liquid from phlebotomy sites  Neuro: No focal deficits, lethargic  Skin: Intact  IV: no phlebitis      Labs:                        7.5    6.75  )-----------( 393      ( 30 Jun 2022 00:15 )             23.7     06-30    137  |  102  |  29<H>  ----------------------------<  105<H>  5.5<H>   |  29  |  1.44<H>    Ca    8.6      30 Jun 2022 05:49  Phos  3.2     06-30  Mg     2.0     06-30    TPro  6.1  /  Alb  2.6<L>  /  TBili  0.2  /  DBili  x   /  AST  21  /  ALT  12  /  AlkPhos  136<H>  06-30      WBC Trend:  WBC Count: 6.75 (06-30-22 @ 00:15)  WBC Count: 8.44 (06-29-22 @ 05:57)  WBC Count: 5.18 (06-28-22 @ 04:10)  WBC Count: 4.30 (06-27-22 @ 22:12)      Creatine Trend:  Creatinine, Serum: 1.44 (06-30)  Creatinine, Serum: 1.34 (06-30)  Creatinine, Serum: 1.32 (06-29)  Creatinine, Serum: 1.31 (06-29)      Liver Biochemical Testing Trend:  Alanine Aminotransferase (ALT/SGPT): 12 (06-30)  Alanine Aminotransferase (ALT/SGPT): 13 (06-29)  Alanine Aminotransferase (ALT/SGPT): 9 *L* (06-28)  Alanine Aminotransferase (ALT/SGPT): 6 *L* (06-27)  Alanine Aminotransferase (ALT/SGPT): 9 *L* (06-26)  Aspartate Aminotransferase (AST/SGOT): 21 (06-30-22 @ 05:49)  Aspartate Aminotransferase (AST/SGOT): 26 (06-29-22 @ 05:57)  Aspartate Aminotransferase (AST/SGOT): 14 (06-28-22 @ 04:10)  Aspartate Aminotransferase (AST/SGOT): 13 (06-27-22 @ 02:00)  Aspartate Aminotransferase (AST/SGOT): 13 (06-26-22 @ 18:14)  Bilirubin Total, Serum: 0.2 (06-30)  Bilirubin Total, Serum: 0.2 (06-29)  Bilirubin Total, Serum: 0.2 (06-28)  Bilirubin Total, Serum: 0.2 (06-27)  Bilirubin Total, Serum: 0.2 (06-26)      Trend LDH  06-11-22 @ 18:47  223  05-02-22 @ 10:05  246<H>  04-30-22 @ 07:15  260<H>  02-03-22 @ 18:58  179          MICROBIOLOGY:    MRSA PCR Result.: NotDetec (06-26-22 @ 11:25)      Culture - CSF with Gram Stain (collected 29 Jun 2022 13:09)  Source: .CSF CSF    Culture - Acid Fast - CSF (collected 29 Jun 2022 13:09)  Source: .CSF CSF    Culture - Nocardia (collected 28 Jun 2022 05:06)  Source: .Other Other  Preliminary Report:    Testing in progress    Culture - Blood (collected 27 Jun 2022 23:27)  Source: .Blood Blood-Peripheral  Preliminary Report:    No growth to date.    Culture - Blood (collected 27 Jun 2022 23:27)  Source: .Blood Blood-Peripheral  Preliminary Report:    No growth to date.    Culture - Bronchial (collected 26 Jun 2022 18:08)  Source: Combi-Cath Combi-Cath  Final Report:    Normal Respiratory Rachel present    Culture - Blood (collected 26 Jun 2022 09:01)  Source: .Blood Blood-Peripheral  Preliminary Report:    No growth to date.    Culture - Blood (collected 26 Jun 2022 08:14)  Source: .Blood Blood-Peripheral  Final Report:    Growth in anaerobic bottle: Staphylococcus epidermidis    Coag Negative Staphylococcus    Single set isolate, possible contaminant. Contact    Microbiology if susceptibility testing clinically    indicated.    ***Blood Panel PCR results on this specimen are available    approximately 3 hours after the Gram stain result.***    Gram stain, PCR, and/or culture results may not always    correspond due to difference in methodologies.    ************************************************************    This PCR assay was performed by multiplex PCR. This    Assay tests for 66 bacterial and resistance gene targets.    Please refer to the [facility] Labs test directory    at [email or web address] for details.  Organism: Blood Culture PCR  Organism: Blood Culture PCR    Sensitivities:      -  Staphylococcus epidermidis, Methicillin resistant: Detec      Method Type: PCR    Culture - Urine (collected 26 Jun 2022 06:25)  Source: Catheterized Catheterized  Final Report:    No growth    Culture - Blood (collected 21 Jun 2022 09:02)  Source: .Blood Blood-Peripheral  Final Report:    No Growth Final      HIV-1/2 Combo Result: Nonreact (04-21-22 @ 14:47)  HIV-1/2 Combo Result: Nonreact (08-09-20 @ 19:26)        Toxoplasma IgM Interpretation: Negative (06-27-22 @ 15:57)  Treponema Pallidum Antibody Interpretation: Negative (06-27-22 @ 02:00)  Cytomegalovirus By PCR: NotDetec (06-26-22 @ 18:14)  Cytomegalovirus By PCR: NotDetec (06-25-22 @ 14:08)    Procalcitonin, Serum: 0.06 (06-26)        RADIOLOGY:  imaging below personally reviewed      < from: Xray Chest 1 View- PORTABLE-Routine (Xray Chest 1 View- PORTABLE-Routine in AM.) (06.29.22 @ 06:43) >  Right pleural thickening/effusion with patchy atelectasis at the right lung base. Mild pulmonary vascular congestion   < end of copied text >

## 2022-06-30 NOTE — PROGRESS NOTE ADULT - PROBLEM SELECTOR PLAN 2
S/p Thymo induction. Stopped CNI as MRI concerning for PRES and increased seizure activity. For Belatacept today. Will follow up with ID. C/w prednisone 5mg daily. MMF held for leukopenia. Valcyte resumed. C/w Fluconazole. C/w Nystatin. Bactrim held for hyperkalemia, will consider starting Atovaquone. Glucose now better controlled. Toxo, Nocardia and Cryptosporidium negative. CMV negative. F/u HHSV 6. 1 BCx. positive for Staph Epi, likely contaminant.      If you have any questions, please feel free to contact me  Ant Pantoja  Nephrology Fellow  [phone]; Prefer [employer] TEAMS  (After 5pm or on weekends please page the on-call fellow).

## 2022-06-30 NOTE — PROGRESS NOTE ADULT - ASSESSMENT
Epilepsy, intractable, with status epilepticus  Encephalopathy  S/p renal transplant, ANA  Prior strokes  Atrial fibrillation  CAD  HTN  DM type 2    - Patient with multiple non-convulsive electrographic seizures that were initially refractory. These only presented as worsening mental status. He was also hypothermic with lower WBC. Seizures, leukocytosis, and hypothermia have since resolved on current therapy. Although CNS infection should be on the differential, especially given immunocompromised state with renal transplant, given low temperature and low WBC would favor more systemic process as opposed to primary CNS. Appreciate ID input and currently recommending antibiotic therapy  - vEEG without seizures, including LLE movements without EEG correlate. STOPPED  - Phenytoin corrected level mildly supratherapeutic and may be contributing to encephalopathy. Would change dosage to phenytoin maintenance with 100mg/100mg/75mg daily. Please check new phenytoin trough (30-60 minutes before dose) and LFT's on 7/2 AM. May adjust then depending on level  - Continue Fycompa at 4mg PO bedtime  - Vimpat and VPA stopped. Due to sedation also stopped standing Ativan  - LP done and appears fairly bland but awaiting full results. Minimal increase of protein to 46 (from normal ceiling of 45) is non-specific but may be related to recently resolved status epilepticus. Will await full results. No neurologic contraindications to restarting Eliquis if no other invasive procedures planned. Await results for VZV Ab's, VDRL, oligoclonal bands, cytology  - Repeat MRI brain w/ and w/o without clear evidence of infection, inflammation, or acute CNS event (possibly minimal enhancement). 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

## 2022-06-30 NOTE — PROGRESS NOTE ADULT - ASSESSMENT
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 Creatinine trending down to 2-2.2mg/dL. H/o seizure d/o with last seizure episode on 4/8/22. Admitted for status epilepticus in the setting of sub therapeutic valproic acid levels. Had right thoracentesis done on 6/10 for effusion complicated by large hemothorax, hemorrhagic shock requiring PRBC x 5 units and vasopressors, chest tube placed. BP now stable on low dose phenylephrine. Now extubated

## 2022-06-30 NOTE — PROGRESS NOTE ADULT - PROBLEM SELECTOR PLAN 1
-test BG q6h whil;e NPO/TFs  -C/w Admelog moderate correction scale but changed to q6h while on TFs instead of q4h  -Added NPH 2 units q 6h. Unable to fully assess as dose was held this morning with subsequent hyperglycemia.  HOLD IF TFS ARE OFF  -Will adjust as needed  -Please inform endo team of any changes on steroid therapy or PO/TF status  Discharge  - Likely discharge on basal bolus insulin, Plan TBD based on insulin requirements at CT.   Outpatient f/u with Endocrinologist Dr. Lincoln. [address] [facility] [address]. Phone  Needs apt at time of discharge  -Needs optho/renal/cardiac f/u as out pt  -Make sure pt has insulin sand DM supplies at time of discharge.

## 2022-06-30 NOTE — PROGRESS NOTE ADULT - SUBJECTIVE AND OBJECTIVE BOX
SICU Daily Progress Note  =====================================================  Interval/Overnight Events:     - Hyperkalemia, s/p Lokelma 10mg x2 and started Lokelma 10mg BID  - LP PCR negative, d/c Vanco and Meropenem     HPI:  Patient is a 65 y/o male w/ a PMHx of CAD s/p CABG (2020), AF on Eliquis c/b CVA (2019) c/b seizures, HTN, HLD, DM type II, hypothyroidism, GI bleed due to a duodenal ulcer (2/2022), and ESRD s/p DDRT (4/21/22) c/b DGF requiring HD (last session 4/29/22) & large perinephric hematoma s/p evacuation w/ revision of arterial anastomosis (5/2/22) who presented on 6/10/22 for status epilepticus. Patient was intubated for airway protection and transferred to MICU where pt loaded with valproic acid and continued home Keppra w/ resolution of his seizures on EEG, patient was extubated on 6/11. Course c/b a large right pleural effusion s/p thoracentesis performed on 67/11 with 1.3 L of serosanguineous fluid . Subsequently, pt started on a heparin infusion for his A. Fib post-procedure. Pt's H&H decreased and right lung looked whiteout on CXR concerning for a hemothorax. Patient was transferred to SICU for further management.    While in SICU, pt reintubated for airway protection, transfused with blood products, and reversed w/ protamine. A right 28 Fr chest tube was placed on 6/11 w/ a significant amount of sanguinous fluid drained. Patient went to OR on 6/15 with thoracic for Right VATS and a second chest tube placement. Patient recovered well and was transferred to the floor on 6/17 and chest tubes were removed 06/18. Hospital course further c/b 10-11 seizures per hour and transferred to SICU for monitoring. On 6/26, pt became less responsive with worsening lethargy and was intubated for a third time for airway protection. Pt's refractory seizures are likely 2/2 Tacrolimus toxicty with concern for PRES syndrome Pt without seizures for 48hrs, EEG removed, and pt extubated 6/28. Pt remains in SICU for further assessment and management.       Allergies: No Known Allergies      MEDICATIONS:   --------------------------------------------------------------------------------------  Neurologic Medications  fosphenytoin IVPB 100 milliGRAM(s) PE IV Intermittent every 8 hours  HYDROmorphone  Injectable 0.5 milliGRAM(s) IV Push every 3 hours PRN Moderate Pain (4 - 6)  perampanel 4 milliGRAM(s) Oral at bedtime    Respiratory Medications    Cardiovascular Medications  amLODIPine   Tablet 10 milliGRAM(s) Oral <User Schedule>  hydrALAZINE 75 milliGRAM(s) Oral every 8 hours    Gastrointestinal Medications  pantoprazole   Suspension 40 milliGRAM(s) Oral daily  polyethylene glycol 3350 17 Gram(s) Oral daily  senna Syrup 10 milliLiter(s) Oral daily    Genitourinary Medications    Hematologic/Oncologic Medications  epoetin cortney-epbx (RETACRIT) Injectable 62264 Unit(s) SubCutaneous every 7 days  heparin  Infusion 800 Unit(s)/Hr IV Continuous <Continuous>    Antimicrobial/Immunologic Medications  fluconAZOLE IVPB 200 milliGRAM(s) IV Intermittent every 24 hours  valGANciclovir 50 mG/mL Oral Solution 450 milliGRAM(s) Oral daily    Endocrine/Metabolic Medications  atorvastatin 40 milliGRAM(s) Oral at bedtime  insulin lispro (ADMELOG) corrective regimen sliding scale   SubCutaneous every 6 hours  insulin NPH human recombinant 2 Unit(s) SubCutaneous every 6 hours  levothyroxine Injectable 40 MICROGram(s) IV Push at bedtime  predniSONE  Solution 5 milliGRAM(s) Oral daily    Topical/Other Medications  chlorhexidine 2% Cloths 1 Application(s) Topical <User Schedule>  sodium zirconium cyclosilicate 10 Gram(s) Oral two times a day    --------------------------------------------------------------------------------------    VITAL SIGNS, INS/OUTS (last 24 hours):  --------------------------------------------------------------------------------------  T(C): 36.5 (06-29-22 @ 23:00), Max: 36.8 (06-29-22 @ 15:00)  HR: 62 (06-30-22 @ 02:00) (53 - 66)  BP: --  RR: 25 (06-30-22 @ 02:00) (10 - 29)  SpO2: 100% (06-30-22 @ 02:00) (95% - 100%)    06-28-22 @ 07:01  -  06-29-22 @ 07:00  --------------------------------------------------------  IN: 1878 mL / OUT: 2325 mL / NET: -447 mL    06-29-22 @ 07:01  -  06-30-22 @ 02:16  --------------------------------------------------------  IN: 2004 mL / OUT: 1165 mL / NET: 839 mL      --------------------------------------------------------------------------------------    EXAM  GENERAL:   NAD, well-groomed, well-developed  HEAD:    Atraumatic, Normocephalic  EYES:   3mm PERRLA, conjunctiva and sclera clear  ENMT:   No oropharyngeal exudates, erythema or lesions,  Moist mucous membranes  NECK:   Supple, no cervical lymphadenopathy, no JVD  NERVOUS SYSTEM:  Intermittently follows commands. Alert & Oriented X1 to name, CN II-XII intact, Moves all extremities  ; Right Upper extremity 1/5, Left Upper extremitiy 5/5; Lower extremities 5/5, full sensation to light touch   CHEST/LUNG:   Breath sounds bilaterally without crackles, rhonchi, wheezes, rubs.  HEART:   cardiac monitor NSR   ; S1/S2 without murmurs, without rubs, or gallops.  ABDOMEN:   Soft, Nontender, Nondistended; Bowel sounds present, Bladder non distended, non palpable  EXTREMITIES:   Pulses palpable radial pulses 2+ bilat, DP/PT 1+/1+ bilat, without clubbing, cyanosis. Digits warm to touch with good cap refill < 3 secs  SKIN:   warm, dry, intact, normal color, no rash or abnormal lesions, without palpable nodes        LABS  --------------------------------------------------------------------------------------                        7.5    6.75  )-----------( 393      ( 30 Jun 2022 00:15 )             23.7   06-30    136  |  101  |  29<H>  ----------------------------<  165<H>  5.2   |  27  |  1.34<H>    Ca    8.5      30 Jun 2022 00:15  Phos  3.3     06-30  Mg     2.0     06-30    TPro  6.2  /  Alb  2.7<L>  /  TBili  0.2  /  DBili  x   /  AST  26  /  ALT  13  /  AlkPhos  134<H>  06-29  ABG - ( 29 Jun 2022 12:15 )  pH, Arterial: 7.45  pH, Blood: x     /  pCO2: 40    /  pO2: 135   / HCO3: 28    / Base Excess: 3.5   /  SaO2: 98.4            PT/INR - ( 29 Jun 2022 05:57 )   PT: 15.9 sec;   INR: 1.38 ratio         PTT - ( 30 Jun 2022 00:15 )  PTT:76.3 sec  --------------------------------------------------------------------------------------

## 2022-06-30 NOTE — PROGRESS NOTE ADULT - ASSESSMENT
66 yr old M with Type 2 DM> unknown control due to skewed A1C 6.6% due to chronic anemia and s/p blood tx. On basal/bolus insulin therapy PTA. DM c/b ESRD s/p DDRT on 3/21, CVA, CAD s/p CABG, Afib on apixaban, seizure activity, HTN, HLD, h/o GI bleed in 2/2022 EGD with duodenal ulcer, discharged to [facility] rehab center after prior hospitalization, now re-admitted from [facility] for seizures c/f status epilepticus in setting of UTI, requiring intubation for hypoxic respiratory failure and admission to MICU, ccb pleural effusion c/b bleeding s/p VATS> extubated and transferred to medicine floor. Patient was having 10-11 seizures per hour, transferred to SICU for monitoring. On 6/26, pt became less responsive with worsening lethargy and was subsequently intubated for airway protection now extubated on 24 hour tube feeds.

## 2022-06-30 NOTE — PROGRESS NOTE ADULT - PROBLEM SELECTOR PLAN 1
S/p DDRT on 4/21/22 Cr. stable in 2 range. ANA in the setting of hemodynamic instability 2/2 ATN. Donor was 58 , KDPI 82%, DCD, single vessels and ureter, HLA mismatch 1, 2, 2. No DSA, cPRA 0%. 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 Readmitted in May for anemia in the setting of large perinephric hematoma s/p evacuation and repair of arterial anastomosis on 5/2/22. Intra operative biopsy showed no rejection. As outpatient SCr. in th low 2 range. Admitted with lowest documented Cr. of 1.8 since transplant which has trended to 1.4 today. S/p Ertapenem for UTI + 1 dose on 6/22. Received 1 dose of ganciclovir on 6/27. CXR reviewed. Repeat MRI reviewed, largely unchanged showing chronic ischemic changes. Optimize hemodynamics. Adequate UOP. C/w Amlodipine 10mg Daily and increase Hydralazine to 100mg TID for hypertension. Give Lasix 40mg IV x 1. Transfuse 1 unit pRBCs. LP negative, Abx. discontinued.    C/w AED as per Neurology. Now on phenytoin and Peramparel. F/u Neurology.

## 2022-06-30 NOTE — PROGRESS NOTE ADULT - SUBJECTIVE AND OBJECTIVE BOX
Chief Complaint: Evaluating this 67 y/o M for uncontrolled Type 2 DM w/ hyperglycemia      Interval History: Remains on tube feeds. Did not receive morning insulin.     MEDICATIONS  (STANDING):  amLODIPine   Tablet 10 milliGRAM(s) Oral <User Schedule>  apixaban 5 milliGRAM(s) Enteral Tube every 12 hours  atorvastatin 40 milliGRAM(s) Oral at bedtime  chlorhexidine 2% Cloths 1 Application(s) Topical <User Schedule>  epoetin cortney-epbx (RETACRIT) Injectable 51742 Unit(s) SubCutaneous every 7 days  fluconAZOLE IVPB 200 milliGRAM(s) IV Intermittent every 24 hours  fosphenytoin IVPB 100 milliGRAM(s) PE IV Intermittent every 8 hours  hydrALAZINE 100 milliGRAM(s) Oral every 8 hours  insulin lispro (ADMELOG) corrective regimen sliding scale   SubCutaneous every 6 hours  insulin NPH human recombinant 2 Unit(s) SubCutaneous every 6 hours  levothyroxine Injectable 40 MICROGram(s) IV Push at bedtime  pantoprazole   Suspension 40 milliGRAM(s) Oral daily  perampanel 4 milliGRAM(s) Oral at bedtime  polyethylene glycol 3350 17 Gram(s) Oral daily  predniSONE  Solution 5 milliGRAM(s) Oral daily  senna Syrup 10 milliLiter(s) Oral daily  sodium zirconium cyclosilicate 10 Gram(s) Oral two times a day  trimethoprim  40 mG/sulfamethoxazole 200 mG Suspension 80 milliGRAM(s) Oral <User Schedule>  valGANciclovir 50 mG/mL Oral Solution 450 milliGRAM(s) Oral daily    MEDICATIONS  (PRN):  HYDROmorphone  Injectable 0.5 milliGRAM(s) IV Push every 3 hours PRN Moderate Pain (4 - 6)      Allergies    No Known Allergies    Intolerances      Review of Systems: Unable to obtain due to lethargy      PHYSICAL EXAM:  VITALS: T(C): 37.5 (06-30-22 @ 11:00)  T(F): 99.5 (06-30-22 @ 11:00), Max: 99.5 (06-30-22 @ 11:00)  HR: 57 (06-30-22 @ 14:00) (53 - 65)  BP: --  RR:  (10 - 29)  SpO2:  (95% - 100%)  Wt(kg): --  GENERAL: NAD at this time, lethargic. Unable to answer questions at this time.   EYES: closed  HEENT:  Atraumatic, Normocephalic, +NGT  RESPIRATORY: Clear to auscultation bilaterally, full excursion, non labored  CARDIOVASCULAR: Regular rhythm; normal S1/S2, no peripheral edema  GI: Soft, nontender, non distended, normal bowel sounds  SKIN: Warm and dry  PSYCH: lethargic      POCT Blood Glucose.: 202 mg/dL (06-30-22 @ 11:08)  POCT Blood Glucose.: 104 mg/dL (06-30-22 @ 05:51)  POCT Blood Glucose.: 166 mg/dL (06-29-22 @ 22:49)  POCT Blood Glucose.: 128 mg/dL (06-29-22 @ 17:55)  POCT Blood Glucose.: 122 mg/dL (06-29-22 @ 17:06)  POCT Blood Glucose.: 241 mg/dL (06-29-22 @ 10:35)  POCT Blood Glucose.: 176 mg/dL (06-29-22 @ 05:05)  POCT Blood Glucose.: 144 mg/dL (06-29-22 @ 02:14)  POCT Blood Glucose.: 199 mg/dL (06-28-22 @ 21:28)  POCT Blood Glucose.: 189 mg/dL (06-28-22 @ 17:38)  POCT Blood Glucose.: 127 mg/dL (06-28-22 @ 14:22)  POCT Blood Glucose.: 266 mg/dL (06-28-22 @ 10:30)  POCT Blood Glucose.: 96 mg/dL (06-28-22 @ 01:24)  POCT Blood Glucose.: 263 mg/dL (06-27-22 @ 22:05)  POCT Blood Glucose.: 237 mg/dL (06-27-22 @ 17:06)        06-30    137  |  102  |  29<H>  ----------------------------<  105<H>  5.5<H>   |  29  |  1.44<H>    eGFR: 53<L>    Ca    8.6      06-30  Mg     2.0     06-30  Phos  3.2     06-30    TPro  6.1  /  Alb  2.6<L>  /  TBili  0.2  /  DBili  x   /  AST  21  /  ALT  12  /  AlkPhos  136<H>  06-30        Thyroid Function Tests:

## 2022-06-30 NOTE — PROGRESS NOTE ADULT - SUBJECTIVE AND OBJECTIVE BOX
Transplant Surgery Multidisciplinary Progress Note   --------------------------------------------------------------  R DCD DDRT    4/21/22    Present: Patient seen and examined with multidisciplinary team including Transplant Surgeon: Dr. Barber,  Nephrologist: Dr. Alfred/Kit, Pharmacist: TAMMY Lim and unit RN during am rounds.  Disciplines not in attendance will be notified of the plan.     HPI: 66M with PMH: DM type II, HTN, CAD s/p CABG in 2020, AFib on Eliquis, CVA in 2019 due to Afib, Seizure d/o following CVA, last episode was on 4/8/22, h/o GIB in 2/2022 EGD with duodenal ulcer.  ESRD due to DM was on HD since 2019.     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  Donor was 58 , KDPI 82%, DCD, single vessels and ureter, HLA mismatch 1, 2, 2. No DSA, cPRA 0%. 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 Re-admitted in May for anemia in the setting of large perinephric hematoma s/p evacuation and repair of arterial anastomosis on 5/2/22. Intra operative biopsy showed no rejection, Creatinine ranging ~2mg/dL.    In Rehab: He was recently dx with klebsiella UTI rx with ertapenem - then switched to Levaquin day #6.    He also had parainfluenza pneumonia. Was at rehab progressing well.      Hospital course:  - 6/10: transferred from rehab facility for seizure status epilepticus. Intubated for respiratory protection and transferred to MICU.  EEG showed no seizure activity. Neuro consulted.   - 6/11: Extubated. Found to have R pleural effusion. s/p Thoracentesis 1.3L. Eliquis changed to heparin drip.  Post procedure drop in H&H. 5u PRBC, 2 u FFP.    - 6/11 evening: Transferred to SICU for further care in setting of hypoxia, significant R pleural effusion, hgb 6 and hemodynamic instability  - 6/11 Intubated at 9pm and sedated on Precedex.  CT placed, 600ml blood drained.  1L total overnight, started pressors  - 6/11 Received Protamine for PTT >100 (was on Heparin drip started for AF), 2 u FFP and 5u PRBC total  - 6/13 s/p VATS 2.2L old blood removed; second chest tube placed  - 6/18-19: chest tubes removed  - 6/21: ?PRES on MRI, off Envarsus, switched to Belatacept 6/23  - 6/25: Tx to SICU for refractory seizures    Interval Events:  -Afebrile on Fluc off abx since yesterday day  - S/P LP with no acute findings  -AAOx1, delirious, intermittently following commands  -off EEG 6/28, seizure free for ~48H prior to removal  -TFs at goal, UO stable    Immunosuppression:   Induction:  Thymoglobulin                                        Maintenance:  -Belatacept #1 6/23, #2 dose today  -MMF remains on HOLD since 6/26,  continue Pred 5  -Ongoing monitoring for signs of rejection.    Potential Discharge date: pending clinical improvement  Education:  Medications  Plan of care:  See Below        MEDICATIONS  (STANDING):  amLODIPine   Tablet 10 milliGRAM(s) Oral <User Schedule>  apixaban 5 milliGRAM(s) Enteral Tube every 12 hours  atorvastatin 40 milliGRAM(s) Oral at bedtime  chlorhexidine 2% Cloths 1 Application(s) Topical <User Schedule>  epoetin cortney-epbx (RETACRIT) Injectable 11125 Unit(s) SubCutaneous every 7 days  fluconAZOLE IVPB 200 milliGRAM(s) IV Intermittent every 24 hours  fosphenytoin IVPB 100 milliGRAM(s) PE IV Intermittent every 8 hours  hydrALAZINE 100 milliGRAM(s) Oral every 8 hours  insulin lispro (ADMELOG) corrective regimen sliding scale   SubCutaneous every 6 hours  insulin NPH human recombinant 2 Unit(s) SubCutaneous every 6 hours  levothyroxine Injectable 40 MICROGram(s) IV Push at bedtime  pantoprazole   Suspension 40 milliGRAM(s) Oral daily  perampanel 4 milliGRAM(s) Oral at bedtime  polyethylene glycol 3350 17 Gram(s) Oral daily  predniSONE  Solution 5 milliGRAM(s) Oral daily  senna Syrup 10 milliLiter(s) Oral daily  sodium zirconium cyclosilicate 10 Gram(s) Oral two times a day  trimethoprim  40 mG/sulfamethoxazole 200 mG Suspension 80 milliGRAM(s) Oral <User Schedule>  valGANciclovir 50 mG/mL Oral Solution 450 milliGRAM(s) Oral daily    MEDICATIONS  (PRN):  HYDROmorphone  Injectable 0.5 milliGRAM(s) IV Push every 3 hours PRN Moderate Pain (4 - 6)      PAST MEDICAL & SURGICAL HISTORY:  Hypertension      Diabetes      Dyslipidemia      CAD (Coronary Artery Disease)  with Stents in 06/2009 and 6/2019, s/p off-pump C3L on 8/13/20      Hypothyroidism      CVA (cerebral vascular accident)  12/13/19 with residual bilateral weakness      Anemia      PEG (percutaneous endoscopic gastrostomy) status  removed July 2020      Intubation of airway performed without difficulty  Dec 2019  CVA c/b status epilepticus requiring intubation and PEG in 12/2019-      ESRD on dialysis  M/W/F      Seizures  after CVA, last seizure was last week 4/07/22      History of insertion of stent into coronary artery bypass graft  2009 and 2019      S/P CABG x 3  off pump C3L on 8/13/20          Vital Signs Last 24 Hrs  T(C): 37.5 (30 Jun 2022 11:00), Max: 37.5 (30 Jun 2022 11:00)  T(F): 99.5 (30 Jun 2022 11:00), Max: 99.5 (30 Jun 2022 11:00)  HR: 57 (30 Jun 2022 14:00) (53 - 65)  BP: --  BP(mean): --  RR: 16 (30 Jun 2022 14:00) (10 - 29)  SpO2: 100% (30 Jun 2022 14:00) (95% - 100%)    I&O's Summary    29 Jun 2022 07:01  -  30 Jun 2022 07:00  --------------------------------------------------------  IN: 2314 mL / OUT: 1380 mL / NET: 934 mL    30 Jun 2022 07:01  -  30 Jun 2022 14:15  --------------------------------------------------------  IN: 504 mL / OUT: 435 mL / NET: 69 mL                              7.5    6.75  )-----------( 393      ( 30 Jun 2022 00:15 )             23.7     06-30    137  |  102  |  29<H>  ----------------------------<  105<H>  5.5<H>   |  29  |  1.44<H>    Ca    8.6      30 Jun 2022 05:49  Phos  3.2     06-30  Mg     2.0     06-30    TPro  6.1  /  Alb  2.6<L>  /  TBili  0.2  /  DBili  x   /  AST  21  /  ALT  12  /  AlkPhos  136<H>  06-30          Culture - Fungal, CSF (collected 06-29-22 @ 13:09)  Source: .CSF CSF  Preliminary Report (06-30-22 @ 09:52):    Testing in progress    Culture - CSF with Gram Stain (collected 06-29-22 @ 13:09)  Source: .CSF CSF  Gram Stain (06-29-22 @ 18:00):    No polymorphonuclear leukocytes seen    No organisms seen    by cytocentrifuge    Culture - Acid Fast - CSF (collected 06-29-22 @ 13:09)  Source: .CSF CSF    Culture - Nocardia (collected 06-28-22 @ 05:06)  Source: .Other Other  Preliminary Report (06-30-22 @ 11:38):    No Nocardia isolated    Culture - Blood (collected 06-27-22 @ 23:27)  Source: .Blood Blood-Peripheral  Preliminary Report (06-29-22 @ 04:02):    No growth to date.    Culture - Blood (collected 06-27-22 @ 23:27)  Source: .Blood Blood-Peripheral  Preliminary Report (06-29-22 @ 04:02):    No growth to date.    Culture - Bronchial (collected 06-26-22 @ 18:08)  Source: Combi-Cath Combi-Cath  Gram Stain (06-27-22 @ 11:17):    Few polymorphonuclear leukocytes per low power field    Rare Squamous Epithelial Cells per low power field    Rare Gram Positive Rods per oil power field  Final Report (06-29-22 @ 08:38):    Normal Respiratory Rachel present    Culture - Blood (collected 06-26-22 @ 09:01)  Source: .Blood Blood-Peripheral  Preliminary Report (06-27-22 @ 15:02):    No growth to date.    Culture - Blood (collected 06-26-22 @ 08:14)  Source: .Blood Blood-Peripheral  Gram Stain (06-27-22 @ 18:23):    Growth in anaerobic bottle: Gram positive cocci in pairs  Final Report (06-28-22 @ 18:26):    Growth in anaerobic bottle: Staphylococcus epidermidis    Coag Negative Staphylococcus    Single set isolate, possible contaminant. Contact    Microbiology if susceptibility testing clinically    indicated.    ***Blood Panel PCR results on this specimen are available    approximately 3 hours after the Gram stain result.***    Gram stain, PCR, and/or culture results may not always    correspond due to difference in methodologies.    ************************************************************    This PCR assay was performed by multiplex PCR. This    Assay tests for 66 bacterial and resistance gene targets.    Please refer to the [facility] test directory    at [email or web address] for details.  Organism: Blood Culture PCR (06-28-22 @ 18:26)  Organism: Blood Culture PCR (06-28-22 @ 18:26)    Culture - Urine (collected 06-26-22 @ 06:25)  Source: Catheterized Catheterized  Final Report (06-27-22 @ 12:20):    No growth                  REVIEW OF SYSTEMS  --------------------------------------------------------------------------------  unable to assess, +delirium        PHYSICAL EXAM:  Constitutional: Well developed / well nourished  Eyes: Anicteric, PERRLA  ENMT: nc/at  Neck: supple  Respiratory: CTA   Cardiovascular: RRR  Gastrointestinal: Soft, non distended, NT, incision healed   Genitourinary: tucker   Extremities: SCD's in place and working bilaterally, RUE pitting edema, improving  Vascular: Palpable dp pulses bilaterally  Neurological: more awake today, AAOx1, intermittently following commands, no tremors noted  Skin: no rashes, ulcerations or lesions  Musculoskeletal: Moving all extremities, global weakness  Psychiatric: calm, periods of mild agitation

## 2022-06-30 NOTE — PROGRESS NOTE ADULT - ASSESSMENT
is a 66 year old Male with PMHx ESRD s/p Rt DDRT 4/21/22,  CVA (2019), Afib on apixaban, seizure activity, CAD s/p stents, CABG (2020), HTN, HLD, DM on insulin, gastric/duodenal ulcer, recent hospitalization 4/28/22 -5/10/22 with weakness/anemia found to have perinephric hematoma requiring evacuation and repair of bleeding arterial anastomosis who presented to [facility] for status epilepticus requiring intubation for hypoxic respiratory failure s/p Intubation on 6/10. On 6/11 underwent thoracentesis in context of Eliquis therapy c/b bleeding into pleural space s/p chest tube placement. On 6/15 s/p uniportal R VATS, evacuation of hemothorax, pneumonolysis, decortication, intercostal nerve block. Pt was intermittently lethargic  with continued seizures. His MRI Brain (6/20) with some findings suggestive of  PRES. Pt underwent LP to exclude CNS infection on 6/29 with elevated OP of 29    WORKUP  s/p LP 6/29: OP 29mmHg, Glucose 125, protein 45, WBC 1, gram stain negative, CSF PCR negative, CSF HSV PCR negative  Xray Chest (06.29.22): Right pleural thickening/effusion with patchy atelectasis at the right  lung base. Mild pulmonary vascular congestion   MR Head w/wo IV Cont (06.27):  Extensive bilateral frontal parietal gliosis and encephalomalacia with minimal enhancement   VA Duplex Upper Ext Right (06.24): Acute DVT is identified in the right internal jugular vein. Superficial vein thrombus is seen in the right cephalic and basilic veins.   6/26: Parvovirus PCR, Adenovirus PCR and CMV PCR, HHV 6 PCR and EBV PCR: negative  Blood Cultures (6/10) NGTD -> Blood cx (6/26): MRSE in 1/4 -> Blood cx (6/27): NGTD  Bronch cx and urine cx (6/26): Negative  MRI Brain (6/20) with some findings suggestive of  PRES  COVID19/FLU/RSV PCR (6/10) Negative  U/A (6/10) with 5 WBC and 25 RBC  Pleural Fluid (6/11) with 71 nucleated cells, 50% PMN, 31% Lymph and Pleural Fluid Culture Negative   CT Chest (6/12) with Right-sided chest tube with large hemothorax and atelectasis of the right lower lobe.  UCx from 6/1 with >100K ESBL Klebsiella (Cipro R but Levofloxacin S) s/p 1-2 doses of Ertapenem and was then switched to Levofloxacin s/p Ertapenem 6/14 --> 6/18    DIAGNOSIS and IMPRESSION  # Seizures  # AMS (Possibly 2/2 PRES vs Seizure vs HTN changes)  # Rt IJV DVT and Rt arm Superficial Thrombophlebitis  # ANA/CKD  #  Rt Hemothorax s/p VATs (6/15)  # Renal Transplant Recipient :DDRT on 04/21/22    Afebrile with no leukocytosis  Positive Blood Culture (6/26 with 1/4 Staph epi): Favor procurement contaminant and repeat blood cx negative  LP with elevated OP, however Cell count, glucose and protein not suggestive of infection  Pt was Empiric Ganciclovir 2.5 mg/kg IV Q24H,  Vancomycin and Meropenem ->stopped yesterday    RECOMMENDATIONS  Will f/u CSF culture and other CSF studies  Continue Bactrim for PCP PPx  c/w Valcyte for CMV PPx   C/w fluconazole for ppx    PRELIM NOTE> PT TO BE SEEN    Neo Stallings MD, PGY4   ID fellow  [employer] Teams Preferred  After 5pm/weekends call [phone]  is a 66 year old Male with PMHx ESRD s/p Rt DDRT 4/21/22,  CVA (2019), Afib on apixaban, seizure activity, CAD s/p stents, CABG (2020), HTN, HLD, DM on insulin, gastric/duodenal ulcer, recent hospitalization 4/28/22 -5/10/22 with weakness/anemia found to have perinephric hematoma requiring evacuation and repair of bleeding arterial anastomosis who presented to [facility] for status epilepticus requiring intubation for hypoxic respiratory failure s/p Intubation on 6/10. On 6/11 underwent thoracentesis in context of Eliquis therapy c/b bleeding into pleural space s/p chest tube placement. On 6/15 s/p uniportal R VATS, evacuation of hemothorax, pneumonolysis, decortication, intercostal nerve block. Pt was intermittently lethargic  with continued seizures. His MRI Brain (6/20) with some findings suggestive of  PRES. Pt underwent LP to exclude CNS infection on 6/29 with elevated OP of 29    WORKUP  s/p LP 6/29: OP 29mmHg, Glucose 125, protein 45, WBC 1, gram stain negative, CSF PCR negative, CSF HSV PCR negative  Xray Chest (06.29.22): Right pleural thickening/effusion with patchy atelectasis at the right  lung base. Mild pulmonary vascular congestion   MR Head w/wo IV Cont (06.27):  Extensive bilateral frontal parietal gliosis and encephalomalacia with minimal enhancement   VA Duplex Upper Ext Right (06.24): Acute DVT is identified in the right internal jugular vein. Superficial vein thrombus is seen in the right cephalic and basilic veins.   6/26: Parvovirus PCR, Adenovirus PCR and CMV PCR, HHV 6 PCR and EBV PCR: negative  Blood Cultures (6/10) NGTD -> Blood cx (6/26): MRSE in 1/4 -> Blood cx (6/27): NGTD  Bronch cx and urine cx (6/26): Negative  MRI Brain (6/20) with some findings suggestive of  PRES  COVID19/FLU/RSV PCR (6/10) Negative  U/A (6/10) with 5 WBC and 25 RBC  Pleural Fluid (6/11) with 71 nucleated cells, 50% PMN, 31% Lymph and Pleural Fluid Culture Negative   CT Chest (6/12) with Right-sided chest tube with large hemothorax and atelectasis of the right lower lobe.  UCx from 6/1 with >100K ESBL Klebsiella (Cipro R but Levofloxacin S) s/p 1-2 doses of Ertapenem and was then switched to Levofloxacin s/p Ertapenem 6/14 --> 6/18    DIAGNOSIS and IMPRESSION  # Seizures  # AMS (Possibly 2/2 Medication vs Seizure vs HTN changes)  # Rt IJV DVT and Rt arm Superficial Thrombophlebitis  # ANA/CKD  #  Rt Hemothorax s/p VATs (6/15)  # Renal Transplant Recipient :DDRT on 04/21/22  Afebrile with no leukocytosis  Positive Blood Culture (6/26 with 1/4 Staph epi): Favor procurement contaminant and repeat blood cx negative  LP with elevated OP, however Cell count, glucose and protein not suggestive of infection  Pt was Empiric Ganciclovir 2.5 mg/kg IV Q24H,  Vancomycin and Meropenem ->stopped yesterday    RECOMMENDATIONS  Will f/u CSF culture and other CSF studies  Continue Bactrim for PCP PPx  c/w Valcyte for CMV PPx   C/w fluconazole for ppx    Pt seen and examined. Case d.w attending    Neo Stallings MD, PGY4   ID fellow  [employer] Teams Preferred  After 5pm/weekends call [phone]

## 2022-06-30 NOTE — PROGRESS NOTE ADULT - TIME BILLING
Kidney recipient with functioning allograft  HTN suboptimal control, DM,  HLD, CVA, Seizures,  downtrending/stable creatinine  S/p VATS  Anemia  S/p extubation; combination regimen for seizure management, Was on Empiric antimicrobial coverage, available CSF studies noted.  Reviewed immunosuppression, management regimen for comorbidities  On modified immunosuppression, currently on Steroids  Noted improved control of seizures.  Suggestions:  Belatacept as scheduled   Agree with PRBC  Hydralazine titrate up to 100 mg/dose  Discussed with SICU team and transplant team  Transplant ID follow up  Will follow  I was present during and reviewed clinical and lab data as well as assessment and plan as documented by the house staff as noted. Please contact if any additional questions with any change in clinical condition or on availability of any additional information or reports.

## 2022-06-30 NOTE — PROGRESS NOTE ADULT - CRITICAL CARE ATTENDING COMMENT
Dr. Martin (Attending Physician)  N - Status Epilepticus, on fosphenytoin level 18.5 and perampanel improving mental status answering some questions today, no observed seizures  P - acute resp insufficiency on 2 liters   C - HTN increase hydralazine to 100 today  GI - NPO on TFs, having BMs after Lokelma   - +900 yesterday, hyperkalemia mild on Lokelma yesterday will give lasix 40 mg IV today with blood tx   H - ALEC WATTS DVT on hep gtt therapeutic, will transition back to Eliquis today  ID - LP yesterday without evidence of infectious source yaquelin vanc/miguel/ganciclovir  E - NPH 2 q6h ordered  PPx - hep gtt switch to eliquis  Lines - Joaquim villegas  Dispo - to remain in SICU

## 2022-06-30 NOTE — PROGRESS NOTE ADULT - ASSESSMENT
66M with h/o DM type II, HTN, CAD s/p CABG in 2020, Afib on Eliquis, CVA in 2019 due to Afib, Seizure d/o (last episode was on 4/8/22), h/o GI bleed in 2/2022 EGD with duodenal ulcer and ESRD on HD s/p R DDRT from DCD donor on 4/21/22 complicated by DGF requiring HD until 4/29/22 now with good graft function who presented with status epilepticus in setting of UTI/antibiotics and sub-therapeutic valproic acid level     Seizure Disorder  - repeat MRI head 6/27 with less concerns for PRES, ischemic changes  - remains seizure free for 3D  - Antiepileptic regimen per Neurology, appreciate recs. please follow closely with team  - Keep Mag > 2  - hypothermia has resolved, Transplant ID following, appreciate recs  - S/p Ganciclovir 2.5mg/kg 6/27 and 6/28. Will hold Valcyte for now  - Continue Fluc    - 1 bottle BCx likely contaminant, repeat cxs negative  - full viral panel pending, studies negative so far  - given improvement in lethargy, will determine LP need tomorrow    R DCD DDRT (ureter stented) 4/21/22  - renal function stable  - Strict I/Os, d/c tucker as able  - TFs to goal  - will remove stent prior to discharge  - H&H soft 7.5/23.7 one u PRBC  - Lasix 40mg IV x1    Immunosuppression:   - Belatacept #1 6/23.  next dose was due 6/27.  if no LP planned for tomorrow, will administer dose  - MMF on HOLD, will restart as able  - continue Pred 5  - PPx:  continue Bactrim MWF and PPI    DM  - on Lantus/Lispro, Endocrine following     AFib:  - continue Retacrit weekly  - Eliquis on hold pending possible LP   - rate controlled    R IJ DVT  - on hep gtt, Eliquis on HOLD    HTN:  - continue Norvasc 10 QD, increase Hydralazine to 100 TID. adjust as needed    Dispo  - continue excellent SICU care

## 2022-06-30 NOTE — PROGRESS NOTE ADULT - SUBJECTIVE AND OBJECTIVE BOX
NEUROLOGY FOLLOW-UP CONSULT NOTE    RFC: Seizures    Interval history: Patient with resolved electrographic non-convulsive seizures. He has been extubated and vEEG stopped. Son, at bedside, reports mental status continues to fluctuate but no new focal neurologic deficits noted. Had LP on 6/29 without incident. No additional acute neurologic events overnight.    Meds:  MEDICATIONS  (STANDING):  amLODIPine   Tablet 10 milliGRAM(s) Oral <User Schedule>  apixaban 5 milliGRAM(s) Enteral Tube every 12 hours  atorvastatin 40 milliGRAM(s) Oral at bedtime  chlorhexidine 2% Cloths 1 Application(s) Topical <User Schedule>  epoetin cortney-epbx (RETACRIT) Injectable 23218 Unit(s) SubCutaneous every 7 days  fluconAZOLE IVPB 200 milliGRAM(s) IV Intermittent every 24 hours  fosphenytoin IVPB 100 milliGRAM(s) PE IV Intermittent every 8 hours  hydrALAZINE 100 milliGRAM(s) Oral every 8 hours  insulin lispro (ADMELOG) corrective regimen sliding scale   SubCutaneous every 6 hours  insulin NPH human recombinant 2 Unit(s) SubCutaneous every 6 hours  levothyroxine Injectable 40 MICROGram(s) IV Push at bedtime  pantoprazole   Suspension 40 milliGRAM(s) Oral daily  perampanel 4 milliGRAM(s) Oral at bedtime  polyethylene glycol 3350 17 Gram(s) Oral daily  predniSONE  Solution 5 milliGRAM(s) Oral daily  senna Syrup 10 milliLiter(s) Oral daily  sodium zirconium cyclosilicate 10 Gram(s) Oral two times a day  trimethoprim  40 mG/sulfamethoxazole 200 mG Suspension 80 milliGRAM(s) Oral <User Schedule>  valGANciclovir 50 mG/mL Oral Solution 450 milliGRAM(s) Oral daily    MEDICATIONS  (PRN):  HYDROmorphone  Injectable 0.5 milliGRAM(s) IV Push every 3 hours PRN Moderate Pain (4 - 6)    GTT:  None    PMHx/PSHx/FHx/SHx:  Acute respiratory failure with hypoxia    No pertinent family history in first degree relatives    No pertinent family history in first degree relatives    No pertinent family history in first degree relatives    FH: HTN (hypertension)    No pertinent family history in first degree relatives    Family history of cancer of tongue (Father)    Handoff    MEWS Score    Hypertension    Diabetes    Dyslipidemia    CAD (Coronary Artery Disease)    CAD (Coronary Artery Disease)    CRI (Chronic Renal Insufficiency)    Hypothyroidism    CVA (cerebral vascular accident)    Anemia    PEG (percutaneous endoscopic gastrostomy) status    Intubation of airway performed without difficulty    ESRD on dialysis    Seizures    Acute respiratory failure with hypoxia    Renal transplant recipient    Immunosuppressive management encounter following kidney transplant    Hemothorax    Hypothyroidism    Type 2 diabetes mellitus with hypoglycemia, with long-term current use of insulin    HTN (hypertension)    HLD (hyperlipidemia)    Lumbar puncture    No significant past surgical history    History of insertion of stent into coronary artery bypass graft    S/P CABG x 3    SEIZURE    30    Convulsive status epilepticus    [email or web address]        Allergies:  No Known Allergies      ROS: Due to clinical condition unable to assess (PERRY)    O:  T(C): 37.5 (06-30-22 @ 11:00), Max: 37.5 (06-30-22 @ 11:00)  HR: 59 (06-30-22 @ 13:00) (53 - 65)  BP: --  RR: 15 (06-30-22 @ 13:00) (10 - 29)  SpO2: 100% (06-30-22 @ 13:00) (95% - 100%)    Focused neurologic exam:  MS - Lethargic to obtunded, attends to all stimuli b/l, minimal speech output, does not follow commands. PERRY orientation, rep/naming, attn/conc, recent and remote memory, fund of knowledge  CN - PERRL, EOMI by OCR, (+) face sens/str by corneals b/l, (+) spontaneous respirations, (+) cough, VFF to threat b/l, hearing intact to conversation b/l, SCM at least 4+/5 b/l and symmetric. PERRY tongue/palate  Motor - Normal bulk. Inc tone in L side (paratonic?) and normal tone in R side. Spontaneous movements of L > R side. Grimaces, withdraws, and localizes to strong noxious in LUE 4+/5, LLE 4+/5, RUE 3+/5, RLE 2/5  Sens - LT/temp intact all, as above  DTR's - 2+ BUE's, 3+ R KJ, 2+ L KJ, 1+ AJ b/l, and neutral R and downgoing L plantar response  Coord - PERRY  Gait and station - PERRY    Pertinent labs/studies:  PT/INR inc 15.8/1.36, PTT inc 76.3  CBC with low H/H 8/24 and MCV WNL, otherwise essentially WNL  BMP with inc K 5.5, inc BUN/Cr 27/1.33 (GFR dec 59), otherwise essentially WNL  Mg WNL, Phos WNL  Albumin dec 2.6, LFT's with inc alk phos 136  NH3 WNL    Phenytoin level (6/30) - 18.5 (corrected 22.7)    CSF (6/29) - clear, colorless, WBC 1, RBC 15, protein minimal inc 46, glucose 125, Cx - no organisms or PMN's, BioFire panel (-), HSV 1/2 PCR (-)    vEEG 6/28 -  EEG SUMMARY/CLASSIFICATION    Abnormal EEG in an altered / a sedated patient.  - Rare right frontal and left frontal sharp waves  - Patient events as described above  - Left frontotemporal slowing  - Mild to moderate generalized slowing    _____________________________________________________________  EEG IMPRESSION/CLINICAL CORRELATE    Abnormal EEG study.  Event of left leg shaking was not epileptic.  Potential epileptogenic foci in the right frontal and left frontal regions.  Structural or functional abnormality in the left frontotemporal region.  Mild to moderate nonspecific diffuse or multifocal cerebral dysfunction.   No seizure seen.      MRI brain w/ and w/o 6/27 - Extensive bilateral frontal parietal gliosis and encephalomalacia with minimal enhancement which may be related to ischemic changes versus changes of hypertensive encephalopathy in a patient with renal transplant on immunosuppressants. No evidence of cerebritis or abscess.      < from: CT Head No Cont (06.24.22 @ 10:29) >    ACC: [id number] EXAM:  CT BRAIN                          PROCEDURE DATE:  06/24/2022      INTERPRETATION:  .    CLINICAL INFORMATION: Worsening lethargy. Altered mental status.    TECHNIQUE: Multiple axial CT images of the head were obtained without   contrast. Sagittal and coronal reconstructed images were acquired from   the source data.    COMPARISON: Prior head CT study from 6/21/2022. Prior brain MRI study   from 6/20/2022.    FINDINGS: Extensive encephalomalacia and gliosis are again noted within   the high bilateral cerebral hemispheres.. Overall appearance is unchanged.    There is no acute intracranial hemorrhage, mass effect, shift of the   midline structures, herniation, hydrocephalus, abnormal extra-axial fluid   collection.    There is diffuse cerebral volume loss with prominence of the sulci,   fissures, and cisternal spaces which is normal for the patient's age.   There is moderate patchy confluent periventricular white matter   hypoattenuation statistically compatible with microvascular changes given   calcific atherosclerotic disease of the intracranial arteries.    The paranasal sinuses and mastoid air cells are clear. The calvarium   appears intact. There is evidence of bilateral cataract removal.    IMPRESSION:No acute intracranial hemorrhage.    Similar-appearing extensive encephalomalacia and gliosis within the   bilateral high cerebral hemispheres.    Similar-appearing moderate severity chronic white matter microvascular   type changes.

## 2022-06-30 NOTE — PROGRESS NOTE ADULT - ASSESSMENT
66 yr old Male with PMHx ESRD s/p permacath removal 5/10/22 s/p Rt DDRT 4/21/22,  CVA (2019), Afib on apixaban, seizure activity, CAD s/p stents, CABG (2020), HTN, HLD, DM on insulin, gastric/duodenal ulcer, recent hospitalization 4/28/22 -5/10/22 with weakness/anemia found to have perinephric hematoma requiring evacuation and repair of bleeding arterial anastomosis. Curently being treated for UTI with Levaquin Today after developing multiple sz episodes refractory to 5 mg versed IM (EMS) and 2 mg ativan IV in E.D. concerning for status epilepticus requiring intubation for hypoxic respiratory  failure. MICU Consult was called for hypoxic respiratory failure secondary to status epilepticus.  Nephrology consulted for renal failure.     A/P  DDRT on 04/21/22  Course complicated by DGF, was on HD until 4/29/22. Readmitted in May for anemia in the setting of large perinephric hematoma s/p evacuation and repair of arterial anastomosis on 5/2/22. Intra operative biopsy showed no rejection.  ANA likely sec to  hemodynamic change   scr fluctuating   optimize glucose   monitor BMP, U/O     Hyperkalemia   patient is on bactrim   on lokelma 10g BID   monitor K closely     HTN   monitor BP closely     Immunosuppression  continue per transplant team

## 2022-06-30 NOTE — PROGRESS NOTE ADULT - SUBJECTIVE AND OBJECTIVE BOX
[facility]   MD NINA MASON MD, DO SADIE RAMIREZ NP    TEL:  OFFICE: [phone]    From 5pm-7am Answering Service [phone]    -- RENAL FOLLOW UP NOTE ---Date of Service 06-30-22 @ 13:06    Patient is a 66y old  Male who presents with a chief complaint of Status Epilepticus (30 Jun 2022 10:29)      Patient seen and examined in ICU.     VITALS:  T(F): 99.5 (06-30-22 @ 11:00), Max: 99.5 (06-30-22 @ 11:00)  HR: 59 (06-30-22 @ 12:00)  BP: --  RR: 13 (06-30-22 @ 12:00)  SpO2: 100% (06-30-22 @ 12:00)  Wt(kg): --    06-29 @ 07:01  -  06-30 @ 07:00  --------------------------------------------------------  IN: 2314 mL / OUT: 1380 mL / NET: 934 mL    06-30 @ 07:01  -  06-30 @ 13:06  --------------------------------------------------------  IN: 404 mL / OUT: 315 mL / NET: 89 mL          PHYSICAL EXAM:  Constitutional: NAD  Neck: No JVD  Respiratory: CTAB, no wheezes, rales or rhonchi  Cardiovascular: S1, S2, RRR  Gastrointestinal: BS+, soft, NT/ND  Extremities: No peripheral edema    Hospital Medications:   MEDICATIONS  (STANDING):  amLODIPine   Tablet 10 milliGRAM(s) Oral <User Schedule>  apixaban 5 milliGRAM(s) Enteral Tube every 12 hours  atorvastatin 40 milliGRAM(s) Oral at bedtime  chlorhexidine 2% Cloths 1 Application(s) Topical <User Schedule>  epoetin cortney-epbx (RETACRIT) Injectable 43774 Unit(s) SubCutaneous every 7 days  fluconAZOLE IVPB 200 milliGRAM(s) IV Intermittent every 24 hours  fosphenytoin IVPB 100 milliGRAM(s) PE IV Intermittent every 8 hours  furosemide   Injectable 40 milliGRAM(s) IV Push once  hydrALAZINE 100 milliGRAM(s) Oral every 8 hours  insulin lispro (ADMELOG) corrective regimen sliding scale   SubCutaneous every 6 hours  insulin NPH human recombinant 2 Unit(s) SubCutaneous every 6 hours  levothyroxine Injectable 40 MICROGram(s) IV Push at bedtime  pantoprazole   Suspension 40 milliGRAM(s) Oral daily  perampanel 4 milliGRAM(s) Oral at bedtime  polyethylene glycol 3350 17 Gram(s) Oral daily  predniSONE  Solution 5 milliGRAM(s) Oral daily  senna Syrup 10 milliLiter(s) Oral daily  sodium zirconium cyclosilicate 10 Gram(s) Oral two times a day  trimethoprim  40 mG/sulfamethoxazole 200 mG Suspension 80 milliGRAM(s) Oral <User Schedule>  valGANciclovir 50 mG/mL Oral Solution 450 milliGRAM(s) Oral daily      LABS:  06-30    137  |  102  |  29<H>  ----------------------------<  105<H>  5.5<H>   |  29  |  1.44<H>    Ca    8.6      30 Jun 2022 05:49  Phos  3.2     06-30  Mg     2.0     06-30    TPro  6.1  /  Alb  2.6<L>  /  TBili  0.2  /  DBili      /  AST  21  /  ALT  12  /  AlkPhos  136<H>  06-30    Creatinine Trend: 1.44 <--, 1.34 <--, 1.32 <--, 1.31 <--, 1.33 <--, 1.51 <--, 1.64 <--, 1.74 <--, 1.89 <--, 2.11 <--, 1.96 <--    Phosphorus Level, Serum: 3.2 mg/dL (06-30 @ 05:49)  Albumin, Serum: 2.6 g/dL (06-30 @ 05:49)  Phosphorus Level, Serum: 3.3 mg/dL (06-30 @ 00:15)  Phosphorus Level, Serum: 3.5 mg/dL (06-29 @ 18:06)                              7.5    6.75  )-----------( 393      ( 30 Jun 2022 00:15 )             23.7     Urine Studies:  Urinalysis - [06-26-22 @ 06:25]      Color Light Yellow / Appearance Clear / SG 1.015 / pH 7.0      Gluc Negative / Ketone Negative  / Bili Negative / Urobili Negative       Blood Negative / Protein Trace / Leuk Est Negative / Nitrite Negative      RBC 3 / WBC 1 / Hyaline 0 / Gran  / Sq Epi  / Non Sq Epi 1 / Bacteria Negative    Urine Creatinine 52      [06-25-22 @ 14:10]  Urine Protein 24      [06-25-22 @ 14:10]  Urine Sodium 68      [06-25-22 @ 14:10]  Urine Osmolality 375      [06-25-22 @ 14:10]    Iron 17, TIBC 171, %sat 10      [05-02-22 @ 13:03]  Ferritin 1505      [05-02-22 @ 13:05]  PTH -- (Ca 9.2)      [02-05-22 @ 10:13]   294  HbA1c 6.0      [02-21-20 @ 08:53]      Syphilis Screen (Treponema Pallidum Ab) Negative      [06-27-22 @ 02:00]    RADIOLOGY & ADDITIONAL STUDIES:

## 2022-06-30 NOTE — PROGRESS NOTE ADULT - PROBLEM SELECTOR PLAN 4
-C/w atorvastatin 40 milliGRAM(s) Oral at bedtime  -F/u lipids as out pt        Aren EPIFANIO Egan  [phone]

## 2022-07-01 NOTE — PROGRESS NOTE ADULT - ASSESSMENT
66 yr old M with Type 2 DM> unknown control due to skewed A1C 6.6% due to chronic anemia and s/p blood tx. On basal/bolus insulin therapy PTA. DM c/b ESRD s/p DDRT on 3/21, CVA, CAD s/p CABG, Afib on apixaban, seizure activity, HTN, HLD, h/o GI bleed in 2/2022 EGD with duodenal ulcer, discharged to [facility] rehab center after prior hospitalization, now re-admitted from [facility] for seizures c/f status epilepticus in setting of UTI, requiring intubation for hypoxic respiratory failure and admission to MICU, ccb pleural effusion c/b bleeding s/p VATS> extubated and transferred to medicine floor. Patient was having 10-11 seizures per hour, transferred to SICU for monitoring. On 6/26, pt became less responsive with worsening lethargy and was subsequently intubated for airway protection. On 6/27, pt became hypothermic with low WBC and was started on empiric abx. Now pt clinical condition improving> extubated on O2 via NC and  tolerating TFs  with  BG levels  between 70s to 200s while on NPH q6h sarwat due to extra dose of regular insulin dose given this am for hyperkalemia, Due to CKD spoke to team to re-start NPH low dose q8h instead of q6h while on Glucerna at 50cc/hr and  change Admelog scale to low dose q8h as well  BG goal (100-180mg/dl).

## 2022-07-01 NOTE — PROGRESS NOTE ADULT - TIME BILLING
Kidney recipient with functioning allograft  HTN suboptimal control, DM,  HLD, CVA, Seizures,   stable creatinine  S/p VATS  Anemia  S/p extubation; combination regimen for seizure management,  available CSF studies noted.  Reviewed immunosuppression, management regimen for comorbidities  On modified immunosuppression, currently on Steroids  Seizures-controlled  Suggestions:  Belatacept as scheduled, MMF restarted   Add Cardura 2 mg for blood pressure control  Hydralazine titrated up to 100 mg/dose  Physical therapy   Discussed with SICU team and transplant team  Will follow  I was present during and reviewed clinical and lab data as well as assessment and plan as documented by the house staff as noted. Please contact if any additional questions with any change in clinical condition or on availability of any additional information or reports.

## 2022-07-01 NOTE — PROGRESS NOTE ADULT - PROBLEM SELECTOR PLAN 1
S/p DDRT on 4/21/22 Cr. stable in 2 range. ANA in the setting of hemodynamic instability 2/2 ATN. Donor was 58 , KDPI 82%, DCD, single vessels and ureter, HLA mismatch 1, 2, 2. No DSA, cPRA 0%. 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 Readmitted in May for anemia in the setting of large perinephric hematoma s/p evacuation and repair of arterial anastomosis on 5/2/22. Intra operative biopsy showed no rejection. As outpatient SCr. in th low 2 range. Admitted with lowest documented Cr. of 1.8 since transplant which has trended to 1.4 today. S/p Ertapenem for UTI + 1 dose on 6/22. Received 1 dose of ganciclovir on 6/27. CXR reviewed. Repeat MRI reviewed, largely unchanged showing chronic ischemic changes. Optimize hemodynamics. Adequate UOP. C/w Amlodipine 10mg Daily and Hydralazine to 100mg TID, start Cardura 2mg for hypertension. Give Lasix 40mg IV x 1. Transfuse 1 unit pRBCs. LP negative, Abx. discontinued.    C/w AED as per Neurology. Now on phenytoin and Peramparel. F/u Neurology.

## 2022-07-01 NOTE — PROGRESS NOTE ADULT - NSPROGADDITIONALINFOA_GEN_ALL_CORE
-Plan discussed with pt/team.  Contact info: [phone] (24/7). pager [phone]  [email or web address] password [email or web address]  Spent  28 minutes assessing pt/labs/meds and discussing plan of care with primary team and son  Adjusting insulin  Discharge plan  Follow up care

## 2022-07-01 NOTE — PROGRESS NOTE ADULT - ASSESSMENT
Patient is a 65 y/o male w/ a PMHx of CAD s/p CABG (2020), AF on Eliquis c/b CVA (2019) c/b seizures, HTN, HLD, DM type II, hypothyroidism, GI bleed due to a duodenal ulcer (2/2022), and ESRD s/p DDRT (4/21/22) c/b DGF requiring HD (last session 4/29/22) & large perinephric hematoma s/p evacuation w/ revision of arterial anastomosis (5/2/22) who presented on 6/10 for status epilepticus. Course complicated by hemothorax, s/p VATS, now with recurrent refractory seizures secondary to tacrolimus toxicity  vs PRES syndrome vs meningitis and intubated for airway protection x3 during admission. Pt extubated 6/28 and remain sin SICU for further assessment and management.       Neuro: Seizures with concern of status epilepticus 2/2 PRES syndrome vs CNS infection   - Intermittently follows commands; A, O x1 to Name   - No seizures since 6/26; d/c EEG ( 6/28)   - Continue Antiepileptics Perampanel 4mg  and [id number] q8  ; d/c Valproic Acid 2/2 ongoing lethargy   - will draw Phenytoin level prior to 7/2 AM dose w/LFTS as per Neuro   - CSF 6/29 neg for infectious etiology w/ protein 46, glucose 125 2/2 s/p status epilepticus. CSF cx and HSV PCR neg. Will f/u VDRL, oligoclonal bands.   -MRI ( 6/27) showed encephalomalacia 2/2 htn encephalopathy vs ischemic changes. Unchanged from previous, no change in management per Neuro   -Pain Control with Dilaudid prn     Resp: s/p VATS evacuation of hemothorax now intubated for airway protection  - Extubated 6/28, tolerating RA; Maintain SpO2 >92%   - Chest tubes removed 6/18  - CXR showed mild vascular congestion. s/p diureses/ will f/u with AM CXR     CVS: Hypertension, Afib; Bradycardia now resolved   - Continue Amlodipine  - Increased Hydralazine to 75mg q8 for goal systolic<150.   - Continue Atorvastatin 40mg    GI: TF  - Keofeed continue TF Jevity 1.5 goal 50cc/hr  - Bowel regimen with senna & Miralax  - Continue home Protonix    Renal: ESRD s/p DDRT (4/21/22) c/b DGF requiring HD (last session 4/29/22) & large perinephric hematoma s/p evacuation w/ revision of arterial anastomosis (5/2/22)   - ANA improving   - Miranda in place for strict I/Os   - Hyperkalemic, s/p Lokelma 10mg x2; start Lokeelma BID per Transplant   - Continue prednisone 5mg daily and Retacrit; mycophenolic acid and Belatacept d/darren for possibility of sepsis as per transplant     Heme: RUE and RIJ DVT + A/C for afib  - Continue Heparin gtt for AC, will f/u with Transplant team on transitioning to Eliquis now s/p LP   - Monitor CBC and coags  - SCD's    ID: ?sepsis 2/2 asp pna vs cns infx    - Bcx ( 6/26) MRSE; f/u repeat BCx ( 6/27)   - Combi-Cath ( 6/26) negative, MRSA neg, UCx ( 6/26) neg   - Viral Workup: CMV, EBV, syphillis, Adenovirus, Parovirus neg ; f/u HHV6 as per transplant  - s/p LP (6/29) negative   - Abx: s/p Vanco ( 6/26-27), s/p Meropenem for hx of ESBL in urine (6/29-6/29) ; continue empiric fluconazole ( 6/27 - ?)  - Ppx Antibc: s/p Ganciclovir, continue Bactrim and Valcyte  - F/u G6PD level     Endo: hx of DM, Hypothyroidism  - HbA1c 6.6 % ( 4/224)  - Monitor glucose, mISS q4 hr  - Continue levothyroxine Patient is a 67 y/o male w/ a PMHx of CAD s/p CABG (2020), AF on Eliquis c/b CVA (2019) c/b seizures, HTN, HLD, DM type II, hypothyroidism, GI bleed due to a duodenal ulcer (2/2022), and ESRD s/p DDRT (4/21/22) c/b DGF requiring HD (last session 4/29/22) & large perinephric hematoma s/p evacuation w/ revision of arterial anastomosis (5/2/22) who presented on 6/10 for status epilepticus. Course complicated by hemothorax, s/p VATS, now with recurrent refractory seizures secondary to tacrolimus toxicity  vs PRES syndrome vs meningitis and intubated for airway protection x3 during admission. Pt extubated 6/28 and remain sin SICU for further assessment and management.       Neuro: Seizures with concern of status epilepticus 2/2 PRES syndrome vs CNS infection   - Intermittently follows commands; A, O x1 to Name   - No seizures since 6/26; d/c EEG ( 6/28)   - Continue Antiepileptics Perampanel 4mg  and [id number] q8  ; d/c Valproic Acid 2/2 ongoing lethargy   - will draw Phenytoin level prior to 7/2 AM dose w/LFTS as per Neuro   - CSF 6/29 neg for infectious etiology w/ protein 46, glucose 125 2/2 s/p status epilepticus. CSF cx and HSV PCR neg. Will f/u VDRL, oligoclonal bands.   -MRI ( 6/27) showed encephalomalacia 2/2 htn encephalopathy vs ischemic changes. Unchanged from previous, no change in management per Neuro   -Pain Control with Dilaudid prn     Resp: s/p VATS evacuation of hemothorax now intubated for airway protection  - Extubated 6/28, tolerating RA; Maintain SpO2 >92%   - Chest tubes removed 6/18  - CXR showed mild vascular congestion. s/p diureses/ will f/u with AM CXR     CVS: Hypertension, Afib; Bradycardia now resolved   - Continue Amlodipine  - Systolics in 150s-160s despite hydralazine 100mg q8. Switched to 10mg IV q8 to maintain systolics <150 for possible PRESS   - Continue Atorvastatin 40mg    GI: TF  - Keofeed continue TF Jevity 1.5 goal 50cc/hr at goal  - Bowel regimen with senna & Miralax  - Continue home Protonix    Renal: ESRD s/p DDRT (4/21/22) c/b DGF requiring HD (last session 4/29/22) & large perinephric hematoma s/p evacuation w/ revision of arterial anastomosis (5/2/22)   - ANA 2/2 hemodynamic fluctuations improving   - making adequate UO  - Miranda in place for strict I/Os   - diuresed 40mgx1 s/p transfusion with good UO response   - Hyperkalemic, s/p Lokelma 10mg x2; start Lokeelma BID per Transplant   -shifted with IV insulin +Dextrose x1 for hyperK as per transplant  - Continue prednisone 5mg daily and Retacrit; mycophenolic acid and Belatacept d/darren for possibility of sepsis as per transplant     Heme: RUE and RIJ DVT + A/C for afib  - transitioned to Eliquis s/p LP   - transfused 1U PRBC Hb responded 7.5-->9.6  - Monitor CBC and coags  - SCD's    ID: ?sepsis 2/2 asp pna vs cns infx    - Bcx ( 6/26) MRSE; f/u repeat BCx ( 6/27) neg  - Combi-Cath ( 6/26) negative, MRSA neg, UCx ( 6/26) neg   - Viral Workup: CMV, EBV, syphillis, Adenovirus, Parovirus neg ; f/u HHV6 as per transplant  - s/p LP (6/29) negative   - Abx: s/p Vanco ( 6/26-27), s/p Meropenem for hx of ESBL in urine (6/26-6/29) ; continue fluconazole for ppx   - Ppx Antibc: s/p Ganciclovir, continue Bactrim and Valcyte  - F/u G6PD level     Endo: hx of DM, Hypothyroidism  - HbA1c 6.6 % ( 4/224)  - Monitor glucose, mISS q4 hr + NPH 2U q6hr  - Continue levothyroxine

## 2022-07-01 NOTE — PROGRESS NOTE ADULT - PROBLEM SELECTOR PLAN 1
-test BG q8h while NPO/TFs  -Restart NPH 2 units q8h 6am 2pm and 10pm. HOLD IF TFS ARE OFF  -Change Admelog to low dose correction scale q8h 6am 2pm and 10pm  -Will adjust as needed  -Please inform endo team of any changes on steroid therapy or PO/TF status  Discharge  - Likely discharge on basal bolus insulin, Plan TBD based on insulin requirements at DC.   Outpatient f/u with Endocrinologist Dr. Lincoln. [address]. Phone  Needs apt at time of discharge  -Needs optho/renal/cardiac f/u as out pt  -Make sure pt has insulin sand DM supplies at time of discharge.

## 2022-07-01 NOTE — PROGRESS NOTE ADULT - SUBJECTIVE AND OBJECTIVE BOX
DIABETES FOLLOW UP NOTE: Saw pt earlier today    Chief Complaint: Endocrine consult requested for management of T2DM    INTERVAL HX: Saw pt this in ICU on O2 via NC and tolerating TFs of Glucerna at 50cc/hr. Noted BG 70s to 200s while on NPH insulin q 6h. Team discontinued NPH this am for BG in 70s but noted pt received Regular insulin ivp this am for hyperkalemia in addition to NPH dose given at 12mn.  BG in 200s at 12noon today. Pt denies any s/sx of hypoglycemia and states feeling better. Denies any SOB/pain. On Prednisone 5mg daily.       Review of Systems:  General: As above  Cardiovascular: No chest pain, palpitations  Respiratory: No SOB, no cough  GI: No nausea, vomiting, abdominal pain  Endocrine: No polyuria, polydipsia   Allergies    No Known Allergies    Intolerances      MEDICATIONS:  atorvastatin 40 milliGRAM(s) Oral at bedtime  fluconAZOLE IVPB 200 milliGRAM(s) IV Intermittent every 24 hours  insulin lispro (ADMELOG) corrective regimen sliding scale   SubCutaneous every 6 hours  insulin NPH human recombinant 2 Unit(s) SubCutaneous every 8 hours  levothyroxine Injectable 40 MICROGram(s) IV Push at bedtime  predniSONE  Solution 5 milliGRAM(s) Oral daily  trimethoprim  40 mG/sulfamethoxazole 200 mG Suspension 80 milliGRAM(s) Oral <User Schedule>  valGANciclovir 50 mG/mL Oral Solution 450 milliGRAM(s) Oral daily      PHYSICAL EXAM:  VITALS: T(C): 36.1 (07-01-22 @ 15:00)  T(F): 97 (07-01-22 @ 15:00), Max: 99 (07-01-22 @ 03:00)  HR: 54 (07-01-22 @ 17:00) (51 - 64)  BP: 156/70 (07-01-22 @ 08:00) (156/70 - 167/74)  RR:  (12 - 24)  SpO2:  (95% - 100%)  Wt(kg): --  GENERAL: Male laying in bed in NAD. Son at bedside (MD)  HEENT: On TF at 50cc/hr  Abdomen: Soft, nontender, non distended  Extremities: Warm,   NEURO: Alert     LABS:  POCT Blood Glucose.: 233 mg/dL (07-01-22 @ 11:58)  POCT Blood Glucose.: 153 mg/dL (07-01-22 @ 09:02)  POCT Blood Glucose.: 76 mg/dL (07-01-22 @ 05:37)  POCT Blood Glucose.: 150 mg/dL (06-30-22 @ 23:10)  POCT Blood Glucose.: 120 mg/dL (06-30-22 @ 18:10)  POCT Blood Glucose.: 202 mg/dL (06-30-22 @ 11:08)  POCT Blood Glucose.: 104 mg/dL (06-30-22 @ 05:51)  POCT Blood Glucose.: 166 mg/dL (06-29-22 @ 22:49)  POCT Blood Glucose.: 128 mg/dL (06-29-22 @ 17:55)  POCT Blood Glucose.: 122 mg/dL (06-29-22 @ 17:06)  POCT Blood Glucose.: 241 mg/dL (06-29-22 @ 10:35)  POCT Blood Glucose.: 176 mg/dL (06-29-22 @ 05:05)  POCT Blood Glucose.: 144 mg/dL (06-29-22 @ 02:14)  POCT Blood Glucose.: 199 mg/dL (06-28-22 @ 21:28)  POCT Blood Glucose.: 189 mg/dL (06-28-22 @ 17:38)                            9.6    8.03  )-----------( 398      ( 01 Jul 2022 00:09 )             29.6       07-01    138  |  101  |  34<H>  ----------------------------<  78  5.1   |  28  |  1.50<H>    eGFR: 51<L>    Ca    8.8      07-01  Mg     2.4     07-01  Phos  3.1     07-01    TPro  6.1  /  Alb  2.4<L>  /  TBili  0.2  /  DBili  x   /  AST  19  /  ALT  12  /  AlkPhos  128<H>  07-01      eGFR: 51 mL/min/1.73m2 (07-01-22 @ 05:52)  eGFR: 52 mL/min/1.73m2 (07-01-22 @ 00:09)  eGFR: 50 mL/min/1.73m2 (06-30-22 @ 18:21)    A1C with Estimated Average Glucose Result: 6.0 % (06-30-22 @ 05:49) skewed due to ESRD  A1C with Estimated Average Glucose Result: 6.6 % (04-24-22 @ 17:12)      Estimated Average Glucose: 126 mg/dL (06-30-22 @ 05:49)  Estimated Average Glucose: 143 mg/dL (04-24-22 @ 17:12)

## 2022-07-01 NOTE — PROGRESS NOTE ADULT - SUBJECTIVE AND OBJECTIVE BOX
Transplant Surgery Multidisciplinary Progress Note   --------------------------------------------------------------  R DCD DDRT    4/21/22    Present: Patient seen and examined with multidisciplinary team including Transplant Surgeon: Dr. Barber,  Nephrologist: Dr. Alfred/Kit, Pharmacist: TAMMY Lim and unit RN during am rounds.  Disciplines not in attendance will be notified of the plan.     HPI: 66M with PMH: DM type II, HTN, CAD s/p CABG in 2020, AFib on Eliquis, CVA in 2019 due to Afib, Seizure d/o following CVA, last episode was on 4/8/22, h/o GIB in 2/2022 EGD with duodenal ulcer.  ESRD due to DM was on HD since 2019.     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  Donor was 58 , KDPI 82%, DCD, single vessels and ureter, HLA mismatch 1, 2, 2. No DSA, cPRA 0%. 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 Re-admitted in May for anemia in the setting of large perinephric hematoma s/p evacuation and repair of arterial anastomosis on 5/2/22. Intra operative biopsy showed no rejection, Creatinine ranging ~2mg/dL.    In Rehab: He was recently dx with klebsiella UTI rx with ertapenem - then switched to Levaquin day #6.    He also had parainfluenza pneumonia. Was at rehab progressing well.      Hospital course:  - 6/10: transferred from rehab facility for seizure status epilepticus. Intubated for respiratory protection and transferred to MICU.  EEG showed no seizure activity. Neuro consulted.   - 6/11: Extubated. Found to have R pleural effusion. s/p Thoracentesis 1.3L. Eliquis changed to heparin drip.  Post procedure drop in H&H. 5u PRBC, 2 u FFP.    - 6/11 evening: Transferred to SICU for further care in setting of hypoxia, significant R pleural effusion, hgb 6 and hemodynamic instability  - 6/11 Intubated at 9pm and sedated on Precedex.  CT placed, 600ml blood drained.  1L total overnight, started pressors  - 6/11 Received Protamine for PTT >100 (was on Heparin drip started for AF), 2 u FFP and 5u PRBC total  - 6/13 s/p VATS 2.2L old blood removed; second chest tube placed  - 6/18-19: chest tubes removed  - 6/21: ?PRES on MRI, off Envarsus, switched to Belatacept 6/23  - 6/25: Tx to SICU for refractory seizures    Interval Events:  -Afebrile on Fluc off abx since yesterday day  -S/P LP with no acute findings  - Received one u PRBC and lasix 40 yesterday H&H improved 9.6/29.6  - Hydralazinr increased to 100mg TID for hypertension SBP remains in the 160's  -AAOx1, delirious, intermittently following commands  -off EEG 6/28, seizure free for ~48H prior to removal  -TFs at goal, UO stable    Immunosuppression:   Induction:  Thymoglobulin                                        Maintenance:  -Belatacept #1 6/23, #2 dose 6/30  -MMF remains on HOLD since 6/26,  continue Pred 5  -Ongoing monitoring for signs of rejection.    Potential Discharge date: pending clinical improvement  Education:  Medications  Plan of care:  See Below         MEDICATIONS  (STANDING):  amLODIPine   Tablet 10 milliGRAM(s) Oral <User Schedule>  apixaban 5 milliGRAM(s) Enteral Tube every 12 hours  atorvastatin 40 milliGRAM(s) Oral at bedtime  chlorhexidine 2% Cloths 1 Application(s) Topical <User Schedule>  doxazosin 2 milliGRAM(s) Oral daily  epoetin cortney-epbx (RETACRIT) Injectable 57870 Unit(s) SubCutaneous every 7 days  fluconAZOLE IVPB 200 milliGRAM(s) IV Intermittent every 24 hours  fosphenytoin IVPB 100 milliGRAM(s) PE IV Intermittent every 8 hours  hydrALAZINE 100 milliGRAM(s) Oral every 8 hours  insulin lispro (ADMELOG) corrective regimen sliding scale   SubCutaneous every 6 hours  levothyroxine Injectable 40 MICROGram(s) IV Push at bedtime  mycophenolate mofetil Suspension 500 milliGRAM(s) Oral every 12 hours  pantoprazole   Suspension 40 milliGRAM(s) Oral daily  perampanel 4 milliGRAM(s) Oral at bedtime  polyethylene glycol 3350 17 Gram(s) Oral daily  predniSONE  Solution 5 milliGRAM(s) Oral daily  senna Syrup 10 milliLiter(s) Oral daily  sodium zirconium cyclosilicate 10 Gram(s) Oral two times a day  trimethoprim  40 mG/sulfamethoxazole 200 mG Suspension 80 milliGRAM(s) Oral <User Schedule>  valGANciclovir 50 mG/mL Oral Solution 450 milliGRAM(s) Oral daily    MEDICATIONS  (PRN):  acetaminophen    Suspension .. 650 milliGRAM(s) Enteral Tube every 6 hours PRN Mild Pain (1 - 3)  hydrALAZINE Injectable 10 milliGRAM(s) IV Push three times a day PRN SBP >160      PAST MEDICAL & SURGICAL HISTORY:  Hypertension      Diabetes      Dyslipidemia      CAD (Coronary Artery Disease)  with Stents in 06/2009 and 6/2019, s/p off-pump C3L on 8/13/20      Hypothyroidism      CVA (cerebral vascular accident)  12/13/19 with residual bilateral weakness      Anemia      PEG (percutaneous endoscopic gastrostomy) status  removed July 2020      Intubation of airway performed without difficulty  Dec 2019  CVA c/b status epilepticus requiring intubation and PEG in 12/2019-      ESRD on dialysis  M/W/F      Seizures  after CVA, last seizure was last week 4/07/22      History of insertion of stent into coronary artery bypass graft  2009 and 2019      S/P CABG x 3  off pump C3L on 8/13/20          Vital Signs Last 24 Hrs  T(C): 36.7 (01 Jul 2022 07:00), Max: 37.2 (30 Jun 2022 15:00)  T(F): 98 (01 Jul 2022 07:00), Max: 99 (30 Jun 2022 15:00)  HR: 54 (01 Jul 2022 11:00) (52 - 64)  BP: 156/70 (01 Jul 2022 08:00) (156/70 - 167/74)  BP(mean): 101 (01 Jul 2022 08:00) (101 - 106)  RR: 17 (01 Jul 2022 11:00) (12 - 24)  SpO2: 96% (01 Jul 2022 11:00) (96% - 100%)    I&O's Summary    30 Jun 2022 07:01  -  01 Jul 2022 07:00  --------------------------------------------------------  IN: 1574 mL / OUT: 2185 mL / NET: -611 mL    01 Jul 2022 07:01  -  01 Jul 2022 12:04  --------------------------------------------------------  IN: 330 mL / OUT: 220 mL / NET: 110 mL                              9.6    8.03  )-----------( 398      ( 01 Jul 2022 00:09 )             29.6     07-01    138  |  101  |  34<H>  ----------------------------<  78  5.1   |  28  |  1.50<H>    Ca    8.8      01 Jul 2022 05:52  Phos  3.1     07-01  Mg     2.4     07-01    TPro  6.1  /  Alb  2.4<L>  /  TBili  0.2  /  DBili  x   /  AST  19  /  ALT  12  /  AlkPhos  128<H>  07-01          Culture - Fungal, CSF (collected 06-29-22 @ 13:09)  Source: .CSF CSF  Preliminary Report (06-30-22 @ 09:52):    Testing in progress    Culture - CSF with Gram Stain (collected 06-29-22 @ 13:09)  Source: .CSF CSF  Gram Stain (06-29-22 @ 18:00):    No polymorphonuclear leukocytes seen    No organisms seen    by cytocentrifuge  Preliminary Report (06-30-22 @ 15:42):    No growth to date.    Culture - Acid Fast - CSF (collected 06-29-22 @ 13:09)  Source: .CSF CSF    Culture - Nocardia (collected 06-28-22 @ 05:06)  Source: .Other Other  Preliminary Report (06-30-22 @ 11:38):    No Nocardia isolated    Culture - Blood (collected 06-27-22 @ 23:27)  Source: .Blood Blood-Peripheral  Preliminary Report (06-29-22 @ 04:02):    No growth to date.    Culture - Blood (collected 06-27-22 @ 23:27)  Source: .Blood Blood-Peripheral  Preliminary Report (06-29-22 @ 04:02):    No growth to date.    Culture - Bronchial (collected 06-26-22 @ 18:08)  Source: Combi-Cath Combi-Cath  Gram Stain (06-27-22 @ 11:17):    Few polymorphonuclear leukocytes per low power field    Rare Squamous Epithelial Cells per low power field    Rare Gram Positive Rods per oil power field  Final Report (06-29-22 @ 08:38):    Normal Respiratory Rachel present    Culture - Blood (collected 06-26-22 @ 09:01)  Source: .Blood Blood-Peripheral  Preliminary Report (06-27-22 @ 15:02):    No growth to date.    Culture - Blood (collected 06-26-22 @ 08:14)  Source: .Blood Blood-Peripheral  Gram Stain (06-27-22 @ 18:23):    Growth in anaerobic bottle: Gram positive cocci in pairs  Final Report (06-28-22 @ 18:26):    Growth in anaerobic bottle: Staphylococcus epidermidis    Coag Negative Staphylococcus    Single set isolate, possible contaminant. Contact    Microbiology if susceptibility testing clinically    indicated.    ***Blood Panel PCR results on this specimen are available    approximately 3 hours after the Gram stain result.***    Gram stain, PCR, and/or culture results may not always    correspond due to difference in methodologies.    ************************************************************    This PCR assay was performed by multiplex PCR. This    Assay tests for 66 bacterial and resistance gene targets.    Please refer to the [facility] test directory    at [email or web address] for details.  Organism: Blood Culture PCR (06-28-22 @ 18:26)  Organism: Blood Culture PCR (06-28-22 @ 18:26)    Culture - Urine (collected 06-26-22 @ 06:25)  Source: Catheterized Catheterized  Final Report (06-27-22 @ 12:20):    No growth        REVIEW OF SYSTEMS  --------------------------------------------------------------------------------  unable to assess, +delirium        PHYSICAL EXAM:  Constitutional: Well developed / well nourished  Eyes: Anicteric, PERRLA  ENMT: nc/at  Neck: supple  Respiratory: CTA   Cardiovascular: RRR  Gastrointestinal: Soft, non distended, NT, incision healed   Genitourinary: tucker   Extremities: SCD's in place and working bilaterally, RUE pitting edema, improving  Vascular: Palpable dp pulses bilaterally  Neurological: more awake today, AAOx1, intermittently following commands, no tremors noted  Skin: no rashes, ulcerations or lesions  Musculoskeletal: Moving all extremities, global weakness  Psychiatric: calm, periods of mild agitation Transplant Surgery Multidisciplinary Progress Note   --------------------------------------------------------------  R DCD DDRT    4/21/22    Present: Patient seen and examined with multidisciplinary team including Transplant Surgeon: Dr. Barber,  Nephrologist: Dr. Alfred/Kit, Pharmacist: TAMMY Lim and unit RN during am rounds.  Disciplines not in attendance will be notified of the plan.     HPI: 66M with PMH: DM type II, HTN, CAD s/p CABG in 2020, AFib on Eliquis, CVA in 2019 due to Afib, Seizure d/o following CVA, last episode was on 4/8/22, h/o GIB in 2/2022 EGD with duodenal ulcer.  ESRD due to DM was on HD since 2019.     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  Donor was 58 , KDPI 82%, DCD, single vessels and ureter, HLA mismatch 1, 2, 2. No DSA, cPRA 0%. 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 Re-admitted in May for anemia in the setting of large perinephric hematoma s/p evacuation and repair of arterial anastomosis on 5/2/22. Intra operative biopsy showed no rejection, Creatinine ranging ~2mg/dL.    In Rehab: He was recently dx with klebsiella UTI rx with ertapenem - then switched to Levaquin day #6.    He also had parainfluenza pneumonia. Was at rehab progressing well.      Hospital course:  - 6/10: transferred from rehab facility for seizure status epilepticus. Intubated for respiratory protection and transferred to MICU.  EEG showed no seizure activity. Neuro consulted.   - 6/11: Extubated. Found to have R pleural effusion. s/p Thoracentesis 1.3L. Eliquis changed to heparin drip.  Post procedure drop in H&H. 5u PRBC, 2 u FFP.    - 6/11 evening: Transferred to SICU for further care in setting of hypoxia, significant R pleural effusion, hgb 6 and hemodynamic instability  - 6/11 Intubated at 9pm and sedated on Precedex.  CT placed, 600ml blood drained.  1L total overnight, started pressors  - 6/11 Received Protamine for PTT >100 (was on Heparin drip started for AF), 2 u FFP and 5u PRBC total  - 6/13 s/p VATS 2.2L old blood removed; second chest tube placed  - 6/18-19: chest tubes removed  - 6/21: ?PRES on MRI, off Envarsus, switched to Belatacept 6/23  - 6/25: Tx to SICU for refractory seizures    Interval Events:  -Afebrile on Fluc off abx since yesterday day  -S/P LP with no acute findings  - Received one u PRBC and lasix 40 yesterday H&H improved 9.6/29.6  - Hydralazinr increased to 100mg TID for hypertension SBP remains in the 160's  -AAOx1, delirious, intermittently following commands  -off EEG 6/28, seizure free for ~48H prior to removal  -TFs at goal, UO stable  -Shifted overnight for K of 5.6 with insulin/D50 repeat K 5.1    Immunosuppression:   Induction:  Thymoglobulin                                        Maintenance:  -Belatacept #1 6/23, #2 dose 6/30  -MMF remains on HOLD since 6/26,  continue Pred 5  -Ongoing monitoring for signs of rejection.    Potential Discharge date: pending clinical improvement  Education:  Medications  Plan of care:  See Below         MEDICATIONS  (STANDING):  amLODIPine   Tablet 10 milliGRAM(s) Oral <User Schedule>  apixaban 5 milliGRAM(s) Enteral Tube every 12 hours  atorvastatin 40 milliGRAM(s) Oral at bedtime  chlorhexidine 2% Cloths 1 Application(s) Topical <User Schedule>  doxazosin 2 milliGRAM(s) Oral daily  epoetin cortney-epbx (RETACRIT) Injectable 25073 Unit(s) SubCutaneous every 7 days  fluconAZOLE IVPB 200 milliGRAM(s) IV Intermittent every 24 hours  fosphenytoin IVPB 100 milliGRAM(s) PE IV Intermittent every 8 hours  hydrALAZINE 100 milliGRAM(s) Oral every 8 hours  insulin lispro (ADMELOG) corrective regimen sliding scale   SubCutaneous every 6 hours  levothyroxine Injectable 40 MICROGram(s) IV Push at bedtime  mycophenolate mofetil Suspension 500 milliGRAM(s) Oral every 12 hours  pantoprazole   Suspension 40 milliGRAM(s) Oral daily  perampanel 4 milliGRAM(s) Oral at bedtime  polyethylene glycol 3350 17 Gram(s) Oral daily  predniSONE  Solution 5 milliGRAM(s) Oral daily  senna Syrup 10 milliLiter(s) Oral daily  sodium zirconium cyclosilicate 10 Gram(s) Oral two times a day  trimethoprim  40 mG/sulfamethoxazole 200 mG Suspension 80 milliGRAM(s) Oral <User Schedule>  valGANciclovir 50 mG/mL Oral Solution 450 milliGRAM(s) Oral daily    MEDICATIONS  (PRN):  acetaminophen    Suspension .. 650 milliGRAM(s) Enteral Tube every 6 hours PRN Mild Pain (1 - 3)  hydrALAZINE Injectable 10 milliGRAM(s) IV Push three times a day PRN SBP >160      PAST MEDICAL & SURGICAL HISTORY:  Hypertension      Diabetes      Dyslipidemia      CAD (Coronary Artery Disease)  with Stents in 06/2009 and 6/2019, s/p off-pump C3L on 8/13/20      Hypothyroidism      CVA (cerebral vascular accident)  12/13/19 with residual bilateral weakness      Anemia      PEG (percutaneous endoscopic gastrostomy) status  removed July 2020      Intubation of airway performed without difficulty  Dec 2019  CVA c/b status epilepticus requiring intubation and PEG in 12/2019-      ESRD on dialysis  M/W/F      Seizures  after CVA, last seizure was last week 4/07/22      History of insertion of stent into coronary artery bypass graft  2009 and 2019      S/P CABG x 3  off pump C3L on 8/13/20          Vital Signs Last 24 Hrs  T(C): 36.7 (01 Jul 2022 07:00), Max: 37.2 (30 Jun 2022 15:00)  T(F): 98 (01 Jul 2022 07:00), Max: 99 (30 Jun 2022 15:00)  HR: 54 (01 Jul 2022 11:00) (52 - 64)  BP: 156/70 (01 Jul 2022 08:00) (156/70 - 167/74)  BP(mean): 101 (01 Jul 2022 08:00) (101 - 106)  RR: 17 (01 Jul 2022 11:00) (12 - 24)  SpO2: 96% (01 Jul 2022 11:00) (96% - 100%)    I&O's Summary    30 Jun 2022 07:01  -  01 Jul 2022 07:00  --------------------------------------------------------  IN: 1574 mL / OUT: 2185 mL / NET: -611 mL    01 Jul 2022 07:01  -  01 Jul 2022 12:04  --------------------------------------------------------  IN: 330 mL / OUT: 220 mL / NET: 110 mL                              9.6    8.03  )-----------( 398      ( 01 Jul 2022 00:09 )             29.6     07-01    138  |  101  |  34<H>  ----------------------------<  78  5.1   |  28  |  1.50<H>    Ca    8.8      01 Jul 2022 05:52  Phos  3.1     07-01  Mg     2.4     07-01    TPro  6.1  /  Alb  2.4<L>  /  TBili  0.2  /  DBili  x   /  AST  19  /  ALT  12  /  AlkPhos  128<H>  07-01          Culture - Fungal, CSF (collected 06-29-22 @ 13:09)  Source: .CSF CSF  Preliminary Report (06-30-22 @ 09:52):    Testing in progress    Culture - CSF with Gram Stain (collected 06-29-22 @ 13:09)  Source: .CSF CSF  Gram Stain (06-29-22 @ 18:00):    No polymorphonuclear leukocytes seen    No organisms seen    by cytocentrifuge  Preliminary Report (06-30-22 @ 15:42):    No growth to date.    Culture - Acid Fast - CSF (collected 06-29-22 @ 13:09)  Source: .CSF CSF    Culture - Nocardia (collected 06-28-22 @ 05:06)  Source: .Other Other  Preliminary Report (06-30-22 @ 11:38):    No Nocardia isolated    Culture - Blood (collected 06-27-22 @ 23:27)  Source: .Blood Blood-Peripheral  Preliminary Report (06-29-22 @ 04:02):    No growth to date.    Culture - Blood (collected 06-27-22 @ 23:27)  Source: .Blood Blood-Peripheral  Preliminary Report (06-29-22 @ 04:02):    No growth to date.    Culture - Bronchial (collected 06-26-22 @ 18:08)  Source: Combi-Cath Combi-Cath  Gram Stain (06-27-22 @ 11:17):    Few polymorphonuclear leukocytes per low power field    Rare Squamous Epithelial Cells per low power field    Rare Gram Positive Rods per oil power field  Final Report (06-29-22 @ 08:38):    Normal Respiratory Rachel present    Culture - Blood (collected 06-26-22 @ 09:01)  Source: .Blood Blood-Peripheral  Preliminary Report (06-27-22 @ 15:02):    No growth to date.    Culture - Blood (collected 06-26-22 @ 08:14)  Source: .Blood Blood-Peripheral  Gram Stain (06-27-22 @ 18:23):    Growth in anaerobic bottle: Gram positive cocci in pairs  Final Report (06-28-22 @ 18:26):    Growth in anaerobic bottle: Staphylococcus epidermidis    Coag Negative Staphylococcus    Single set isolate, possible contaminant. Contact    Microbiology if susceptibility testing clinically    indicated.    ***Blood Panel PCR results on this specimen are available    approximately 3 hours after the Gram stain result.***    Gram stain, PCR, and/or culture results may not always    correspond due to difference in methodologies.    ************************************************************    This PCR assay was performed by multiplex PCR. This    Assay tests for 66 bacterial and resistance gene targets.    Please refer to the [facility] test directory    at [email or web address] for details.  Organism: Blood Culture PCR (06-28-22 @ 18:26)  Organism: Blood Culture PCR (06-28-22 @ 18:26)    Culture - Urine (collected 06-26-22 @ 06:25)  Source: Catheterized Catheterized  Final Report (06-27-22 @ 12:20):    No growth        REVIEW OF SYSTEMS  --------------------------------------------------------------------------------  unable to assess, +delirium        PHYSICAL EXAM:  Constitutional: Well developed / well nourished  Eyes: Anicteric, PERRLA  ENMT: nc/at  Neck: supple  Respiratory: CTA   Cardiovascular: RRR  Gastrointestinal: Soft, non distended, NT, incision healed   Genitourinary: tucker   Extremities: SCD's in place and working bilaterally, RUE pitting edema, improving  Vascular: Palpable dp pulses bilaterally  Neurological: more awake today, AAOx1, intermittently following commands, no tremors noted  Skin: no rashes, ulcerations or lesions  Musculoskeletal: Moving all extremities, global weakness  Psychiatric: calm, periods of mild agitation

## 2022-07-01 NOTE — PROGRESS NOTE ADULT - SUBJECTIVE AND OBJECTIVE BOX
HISTORY  Patient is a 67 y/o male w/ a PMHx of CAD s/p CABG (2020), AF on Eliquis c/b CVA (2019) c/b seizures, HTN, HLD, DM type II, hypothyroidism, GI bleed due to a duodenal ulcer (2/2022), and ESRD s/p DDRT (4/21/22) c/b DGF requiring HD (last session 4/29/22) & large perinephric hematoma s/p evacuation w/ revision of arterial anastomosis (5/2/22) who presented on 6/10/22 for status epilepticus. Patient was intubated for airway protection and transferred to MICU where pt loaded with valproic acid and continued home Keppra w/ resolution of his seizures on EEG, patient was extubated on 6/11. Course c/b a large right pleural effusion s/p thoracentesis performed on 67/11 with 1.3 L of serosanguineous fluid . Subsequently, pt started on a heparin infusion for his A. Fib post-procedure. Pt's H&H decreased and right lung looked whiteout on CXR concerning for a hemothorax. Patient was transferred to SICU for further management.    While in SICU, pt reintubated for airway protection, transfused with blood products, and reversed w/ protamine. A right 28 Fr chest tube was placed on 6/11 w/ a significant amount of sanguinous fluid drained. Patient went to OR on 6/15 with thoracic for Right VATS and a second chest tube placement. Patient recovered well and was transferred to the floor on 6/17 and chest tubes were removed 06/18. Hospital course further c/b 10-11 seizures per hour and transferred to SICU for monitoring. On 6/26, pt became less responsive with worsening lethargy and was intubated for a third time for airway protection. Pt's refractory seizures are likely 2/2 Tacrolimus toxicty with concern for PRES syndrome Pt without seizures for 48hrs, EEG removed, and pt extubated 6/28. Pt remains in SICU for further assessment and management.     24 HOUR EVENTS:  - LP 6/29 negative for infectious etiology  - no seizure activity since 6/26. vEEG d/darren   SUBJECTIVE/ROS:  [ ] A ten-point review of systems was otherwise negative except as noted.  Pt has no acute complaints.       NEURO  RASS:  0   GCS: 13  Exam: awake, alert, oriented to person and year (not to place)  Meds: acetaminophen    Suspension .. 650 milliGRAM(s) Enteral Tube every 6 hours PRN Mild Pain (1 - 3)  fosphenytoin IVPB 100 milliGRAM(s) PE IV Intermittent every 8 hours  perampanel 4 milliGRAM(s) Oral at bedtime    [x] Adequacy of sedation and pain control has been assessed and adjusted      RESPIRATORY  RR: 13 (07-01-22 @ 00:00) (12 - 25)  SpO2: 100% (07-01-22 @ 00:00) (95% - 100%)  Wt(kg): --  Exam: unlabored, clear to auscultation bilaterally   ABG - ( 29 Jun 2022 12:15 )  pH: 7.45  /  pCO2: 40    /  pO2: 135   / HCO3: 28    / Base Excess: 3.5   /  SaO2: 98.4    Lactate: x        Meds:       CARDIOVASCULAR  HR: 59 (07-01-22 @ 00:00) (57 - 65)  BP: 167/74 (06-30-22 @ 20:00) (167/74 - 167/74)  BP(mean): 106 (06-30-22 @ 20:00) (106 - 106)  ABP: 173/55 (07-01-22 @ 00:00) (143/52 - 186/58)  ABP(mean): 97 (07-01-22 @ 00:00) (82 - 104)  Wt(kg): --  CVP(cm H2O): --      Exam: regular rate and rhythm  Cardiac Rhythm: sinus  Perfusion     [x]Adequate   [ ]Inadequate  Mentation   [x]Normal       [ ]Reduced  Extremities  [x]Warm         [ ]Cool  Volume Status [ ]Hypervolemic [x]Euvolemic [ ]Hypovolemic  Meds: amLODIPine   Tablet 10 milliGRAM(s) Oral <User Schedule>  hydrALAZINE 100 milliGRAM(s) Oral every 8 hours  hydrALAZINE Injectable 10 milliGRAM(s) IV Push three times a day PRN SBP >160        GI/NUTRITION  Exam: soft, nontender, nondistended  Diet: NPO, TF jevity 1.5 @ 50cc/hr  Meds: pantoprazole   Suspension 40 milliGRAM(s) Oral daily  polyethylene glycol 3350 17 Gram(s) Oral daily  senna Syrup 10 milliLiter(s) Oral daily      GENITOURINARY  I&O's Detail    06-29 @ 07:01  -  06-30 @ 07:00  --------------------------------------------------------  IN:    Enteral Tube Flush: 110 mL    Glucerna 1.5: 1200 mL    Heparin: 204 mL    IV PiggyBack: 350 mL    IV PiggyBack: 450 mL  Total IN: 2314 mL    OUT:    Indwelling Catheter - Urethral (mL): 1380 mL  Total OUT: 1380 mL    Total NET: 934 mL      06-30 @ 07:01  -  07-01 @ 01:44  --------------------------------------------------------  IN:    Enteral Tube Flush: 150 mL    Glucerna 1.5: 850 mL    Heparin: 24 mL    IV PiggyBack: 100 mL    IV PiggyBack: 100 mL  Total IN: 1224 mL    OUT:    Indwelling Catheter - Urethral (mL): 1635 mL  Total OUT: 1635 mL    Total NET: -411 mL          07-01    139  |  102  |  33<H>  ----------------------------<  176<H>  5.6<H>   |  27  |  1.47<H>    Ca    8.5      01 Jul 2022 00:09  Phos  3.2     07-01  Mg     1.9     07-01    TPro  6.1  /  Alb  2.6<L>  /  TBili  0.2  /  DBili  x   /  AST  21  /  ALT  12  /  AlkPhos  136<H>  06-30    [x] Miranda catheter, indication: strict I/Os   Meds: magnesium sulfate  IVPB 2 Gram(s) IV Intermittent once        HEMATOLOGIC  Meds: apixaban 5 milliGRAM(s) Enteral Tube every 12 hours    [x] VTE Prophylaxis                        9.6    8.03  )-----------( 398      ( 01 Jul 2022 00:09 )             29.6     PT/INR - ( 30 Jun 2022 05:49 )   PT: 15.8 sec;   INR: 1.36 ratio         PTT - ( 30 Jun 2022 00:15 )  PTT:76.3 sec  Transfusion     [ ] PRBC   [ ] Platelets   [ ] FFP   [ ] Cryoprecipitate      INFECTIOUS DISEASES  WBC Count: 8.03 K/uL (07-01 @ 00:09)  WBC Count: 8.17 K/uL (06-30 @ 18:21)    RECENT CULTURES:  Specimen Source: .CSF CSF  Date/Time: 06-29 @ 13:09  Culture Results:   No growth to date.  Gram Stain:   No polymorphonuclear leukocytes seen  No organisms seen  by cytocentrifuge  Organism: --  Specimen Source: .Other Other  Date/Time: 06-28 @ 05:06  Culture Results:   No Nocardia isolated  Gram Stain: --  Organism: --  Specimen Source: .Blood Blood-Peripheral  Date/Time: 06-27 @ 23:27  Culture Results:   No growth to date.  Gram Stain: --  Organism: --  Specimen Source: Combi-Cath Combi-Cath  Date/Time: 06-26 @ 18:08  Culture Results:   Normal Respiratory Rachel present  Gram Stain:   Few polymorphonuclear leukocytes per low power field  Rare Squamous Epithelial Cells per low power field  Rare Gram Positive Rods per oil power field  Organism: --  Specimen Source: .Blood Blood-Peripheral  Date/Time: 06-26 @ 09:01  Culture Results:   No growth to date.  Gram Stain: --  Organism: --  Specimen Source: .Blood Blood-Peripheral  Date/Time: 06-26 @ 08:14  Culture Results:   Growth in anaerobic bottle: Staphylococcus epidermidis  Coag Negative Staphylococcus  Single set isolate, possible contaminant. Contact  Microbiology if susceptibility testing clinically  indicated.  ***Blood Panel PCR results on this specimen are available  approximately 3 hours after the Gram stain result.***  Gram stain, PCR, and/or culture results may not always  correspond due to difference in methodologies.  ************************************************************  This PCR assay was performed by multiplex PCR. This  Assay tests for 66 bacterial and resistance gene targets.  Please refer to the [facility] test directory  at [email or web address] for details.  Gram Stain:   Growth in anaerobic bottle: Gram positive cocci in pairs  Organism: Blood Culture PCR  Specimen Source: Catheterized Catheterized  Date/Time: 06-26 @ 06:25  Culture Results:   No growth  Gram Stain: --  Organism: --    Meds: epoetin cortney-epbx (RETACRIT) Injectable 63355 Unit(s) SubCutaneous every 7 days  fluconAZOLE IVPB 200 milliGRAM(s) IV Intermittent every 24 hours  trimethoprim  40 mG/sulfamethoxazole 200 mG Suspension 80 milliGRAM(s) Oral <User Schedule>  valGANciclovir 50 mG/mL Oral Solution 450 milliGRAM(s) Oral daily        ENDOCRINE  CAPILLARY BLOOD GLUCOSE      POCT Blood Glucose.: 150 mg/dL (30 Jun 2022 23:10)  POCT Blood Glucose.: 120 mg/dL (30 Jun 2022 18:10)  POCT Blood Glucose.: 202 mg/dL (30 Jun 2022 11:08)  POCT Blood Glucose.: 104 mg/dL (30 Jun 2022 05:51)    Meds: atorvastatin 40 milliGRAM(s) Oral at bedtime  insulin lispro (ADMELOG) corrective regimen sliding scale   SubCutaneous every 6 hours  insulin NPH human recombinant 2 Unit(s) SubCutaneous every 6 hours  levothyroxine Injectable 40 MICROGram(s) IV Push at bedtime  predniSONE  Solution 5 milliGRAM(s) Oral daily        ACCESS DEVICES:  [x] Peripheral IV  [ ] Central Venous Line	[ ] R	[ ] L	[ ] IJ	[ ] Fem	[ ] SC	Placed:   [ ] Arterial Line		[ ] R	[ ] L	[ ] Fem	[ ] Rad	[ ] Ax	Placed:   [ ] PICC:					[ ] Mediport  [ ] Urinary Catheter, Date Placed:   [x] Necessity of urinary, arterial, and venous catheters discussed    OTHER MEDICATIONS:  chlorhexidine 2% Cloths 1 Application(s) Topical <User Schedule>  sodium zirconium cyclosilicate 10 Gram(s) Oral two times a day      CODE STATUS: full code       IMAGING:  MRI head 6/27:  Magnetic resonance imaging of the brain was carried out with transaxial   SPGR, FLAIR, fast spin echo T2 weighted images, axial susceptibility   weighted series, diffusion weighted series and sagittal T1 weighted   series on a 1.5 Anisa magnet. Post contrast axial, coronal and sagittal   T1 weighted images were obtained. 6 cc of Gadavist were intravenously   injected, 1.5 cc were discarded.    Comparison is made with the prior brain CT of 6/24/2022.      Mild to moderate atrophy is identified with ventricular and sulcal   prominence. Extensive small vessel white matter ischemic changes are   noted. There is extensive bifrontal parietal encephalomalacia and gliosis   involving the centrum semiovale ovale in the border zone regions. These   do not demonstrate diffusion restriction and demonstrate bright signal   intensity on T1-weighted images which does not lymphoma on susceptibility   weighted series suggesting laminar necrosis rather than old hemorrhage.   After contrast administration there is mild gyral enhancement in a small   portion of the frontal cortex. There is no evidence of cerebritis or   abscess.        IMPRESSION: Extensive bilateral frontal parietal gliosis and   encephalomalacia with minimal enhancement which may be related to   ischemic changes versus changes of hypertensive encephalopathy in a   patient with renal transplant on immunosuppressants. No evidence of   cerebritis or abscess.

## 2022-07-01 NOTE — PROGRESS NOTE ADULT - PROBLEM SELECTOR PLAN 2
S/p Thymo induction. Stopped CNI as MRI concerning for PRES and increased seizure activity. For Belatacept today. Will follow up with ID. C/w prednisone 5mg daily. Resume MMF 500mg BID (previously held for leukopenia.) Valcyte resumed. C/w Fluconazole. C/w Nystatin. Bactrim held for hyperkalemia, will consider starting Atovaquone. Glucose now better controlled. Toxo, Nocardia and Cryptosporidium negative. CMV negative. F/u HHSV 6. 1 BCx. positive for Staph Epi, likely contaminant.      If you have any questions, please feel free to contact me  Ant Pantoja  Nephrology Fellow  [phone]; Prefer [employer] TEAMS  (After 5pm or on weekends please page the on-call fellow).

## 2022-07-01 NOTE — PROGRESS NOTE ADULT - SUBJECTIVE AND OBJECTIVE BOX
[facility] DIVISION OF KIDNEY DISEASES AND HYPERTENSION -- FOLLOW UP NOTE  --------------------------------------------------------------------------------  HPI: 66 yr old man with ESRD due to DM was on HD since 2019, s/p DCD DDRT on 4/21/22. Donor was 58 , KDPI 82%, DCD, single vessels and ureter, HLA mismatch 1, 2, 2. No DSA, cPRA 0%. 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 Readmitted in May for anemia in the setting of large perinephric hematoma s/p evacuation and repair of arterial anastomosis on 5/2/22. Intra operative biopsy showed no rejection, Creatinine ranging 2mg/dL. He was recently dx with klebsiella UTI rx with ertapenem - then switched to Levaquin day #6. He also had parainfluenza pneumonia. Was at rehab progressing well. Presented with status epilepticus. Intubated for respiratory protection. Right pleural effusion drained 1300ml. Transfused 1 unit PRBC. Pt. transferred to SICU and was transfused 4 additional units for a total of 6 units. Pt. re-transferred to ICU for NCSE. Intubated for airway protection on 6/25.    Pt. seen this AM. Some improvement in mentation, oriented to person but more alert and able to answer questions with 1 one word answers. SCr. remains stable. UOP adequate.     PAST HISTORY  --------------------------------------------------------------------------------  No significant changes to PMH, PSH, FHx, SHx, unless otherwise noted    ALLERGIES & MEDICATIONS  --------------------------------------------------------------------------------  Allergies    No Known Allergies    Intolerances      Standing Inpatient Medications  amLODIPine   Tablet 10 milliGRAM(s) Oral <User Schedule>  apixaban 5 milliGRAM(s) Enteral Tube every 12 hours  atorvastatin 40 milliGRAM(s) Oral at bedtime  chlorhexidine 2% Cloths 1 Application(s) Topical <User Schedule>  doxazosin 2 milliGRAM(s) Oral daily  epoetin cortney-epbx (RETACRIT) Injectable 19224 Unit(s) SubCutaneous every 7 days  fluconAZOLE IVPB 200 milliGRAM(s) IV Intermittent every 24 hours  fosphenytoin IVPB 100 milliGRAM(s) PE IV Intermittent every 8 hours  hydrALAZINE 100 milliGRAM(s) Oral every 8 hours  insulin lispro (ADMELOG) corrective regimen sliding scale   SubCutaneous every 6 hours  levothyroxine Injectable 40 MICROGram(s) IV Push at bedtime  mycophenolate mofetil Suspension 500 milliGRAM(s) Oral every 12 hours  pantoprazole   Suspension 40 milliGRAM(s) Oral daily  perampanel 4 milliGRAM(s) Oral at bedtime  polyethylene glycol 3350 17 Gram(s) Oral daily  predniSONE  Solution 5 milliGRAM(s) Oral daily  senna Syrup 10 milliLiter(s) Oral daily  sodium zirconium cyclosilicate 10 Gram(s) Oral two times a day  trimethoprim  40 mG/sulfamethoxazole 200 mG Suspension 80 milliGRAM(s) Oral <User Schedule>  valGANciclovir 50 mG/mL Oral Solution 450 milliGRAM(s) Oral daily    PRN Inpatient Medications  acetaminophen    Suspension .. 650 milliGRAM(s) Enteral Tube every 6 hours PRN  hydrALAZINE Injectable 10 milliGRAM(s) IV Push three times a day PRN      REVIEW OF SYSTEMS  --------------------------------------------------------------------------------  Unable to reliably obtain. Denies any pain.     VITALS/PHYSICAL EXAM  --------------------------------------------------------------------------------  T(C): 36.7 (07-01-22 @ 07:00), Max: 37.2 (06-30-22 @ 15:00)  HR: 54 (07-01-22 @ 11:00) (52 - 64)  BP: 156/70 (07-01-22 @ 08:00) (156/70 - 167/74)  RR: 17 (07-01-22 @ 11:00) (12 - 24)  SpO2: 96% (07-01-22 @ 11:00) (96% - 100%)  Wt(kg): --        06-30-22 @ 07:01  -  07-01-22 @ 07:00  --------------------------------------------------------  IN: 1574 mL / OUT: 2185 mL / NET: -611 mL    07-01-22 @ 07:01  -  07-01-22 @ 12:14  --------------------------------------------------------  IN: 330 mL / OUT: 220 mL / NET: 110 mL    Physical Exam:  	Gen: NAD  	HEENT: MMM, NGT+  	Pulm: CTAB anteriorly  	CV: S1S2+  	Abd: +BS, soft, non-tender          Transplant: No tenderness, swelling  	: No suprapubic tenderness, Miranda+  	MSK: no LE edema, some UE edema, R>L  	Psych/Neuro: Responds to some simple commands  	Skin: Warm          Access: Peripheral     LABS/STUDIES  --------------------------------------------------------------------------------              9.6    8.03  >-----------<  398      [07-01-22 @ 00:09]              29.6     138  |  101  |  34  ----------------------------<  78      [07-01-22 @ 05:52]  5.1   |  28  |  1.50        Ca     8.8     [07-01-22 @ 05:52]      Mg     2.4     [07-01-22 @ 05:52]      Phos  3.1     [07-01-22 @ 05:52]    TPro  6.1  /  Alb  2.4  /  TBili  0.2  /  DBili  x   /  AST  19  /  ALT  12  /  AlkPhos  128  [07-01-22 @ 05:52]    PT/INR: PT 16.6 , INR 1.44       [07-01-22 @ 05:52]  PTT: 39.4       [07-01-22 @ 05:52]      Creatinine Trend:  SCr 1.50 [07-01 @ 05:52]  SCr 1.47 [07-01 @ 00:09]  SCr 1.52 [06-30 @ 18:21]  SCr 1.44 [06-30 @ 05:49]  SCr 1.34 [06-30 @ 00:15]    Urinalysis - [06-26-22 @ 06:25]      Color Light Yellow / Appearance Clear / SG 1.015 / pH 7.0      Gluc Negative / Ketone Negative  / Bili Negative / Urobili Negative       Blood Negative / Protein Trace / Leuk Est Negative / Nitrite Negative      RBC 3 / WBC 1 / Hyaline 0 / Gran  / Sq Epi  / Non Sq Epi 1 / Bacteria Negative    Urine Creatinine 52      [06-25-22 @ 14:10]  Urine Protein 24      [06-25-22 @ 14:10]  Urine Sodium 68      [06-25-22 @ 14:10]  Urine Osmolality 375      [06-25-22 @ 14:10]    Iron 17, TIBC 171, %sat 10      [05-02-22 @ 13:03]  Ferritin 1505      [05-02-22 @ 13:05]  PTH -- (Ca 9.2)      [02-05-22 @ 10:13]   294  HbA1c 6.0      [02-21-20 @ 08:53]      Syphilis Screen (Treponema Pallidum Ab) Negative      [06-27-22 @ 02:00]

## 2022-07-01 NOTE — PROGRESS NOTE ADULT - NUTRITIONAL ASSESSMENT
Diet, NPO with Tube Feed:   Tube Feeding Modality: Nasogastric  Glucerna 1.5 Tyson (GLUCERNA1.5RTH)  Total Volume for 24 Hours (mL): 1200  Continuous  Starting Tube Feed Rate {mL per Hour}: 50  Until Goal Tube Feed Rate (mL per Hour): 50  Tube Feed Duration (in Hours): 24  Tube Feed Start Time: 17:00 (06-30-22 @ 22:28) [Active]        Please see RD assessment and/or follow up.  Managed by primary team as well

## 2022-07-01 NOTE — PROGRESS NOTE ADULT - ASSESSMENT
66M with h/o DM type II, HTN, CAD s/p CABG in 2020, Afib on Eliquis, CVA in 2019 due to Afib, Seizure d/o (last episode was on 4/8/22), h/o GI bleed in 2/2022 EGD with duodenal ulcer and ESRD on HD s/p R DDRT from DCD donor on 4/21/22 complicated by DGF requiring HD until 4/29/22 now with good graft function who presented with status epilepticus in setting of UTI/antibiotics and sub-therapeutic valproic acid level     Seizure Disorder  - repeat MRI head 6/27 with less concerns for PRES, ischemic changes  - remains seizure free for 3D  - Antiepileptic regimen per Neurology, appreciate recs. please follow closely with team  - Keep Mag > 2  - hypothermia has resolved, Transplant ID following, appreciate recs  - S/p Ganciclovir 2.5mg/kg 6/27 and 6/28. Will hold Valcyte for now  - Continue Fluc    - 1 bottle BCx likely contaminant, repeat cxs negative  - full viral panel pending, studies negative so far  - given improvement in lethargy, will determine LP need tomorrow    R DCD DDRT (ureter stented) 4/21/22  - renal function stable  - Strict I/Os, d/c tucker as able  - TFs to goal  - will remove stent prior to discharge  - Add free water bolus 200ml Q8hrs      Immunosuppression:   - Belatacept #1 6/23.  #2 6/30  - Resume MMF 500mg BID today  - continue Pred 5  - PPx:  continue Bactrim MWF and PPI    DM  - on Lantus/Lispro, Endocrine following     AFib:  - continue Retacrit weekly  - Eliquis on hold pending possible LP   - rate controlled    R IJ DVT  - on hep gtt, Eliquis on HOLD    HTN:  - continue Norvasc 10 QD, Hydralazine 100 TID.   - Add Cardura 2mg QD    Dispo  - continue excellent SICU care

## 2022-07-01 NOTE — PROGRESS NOTE ADULT - NUTRITIONAL ASSESSMENT
This patient has been assessed with a concern for Malnutrition and has been determined to have a diagnosis/diagnoses of Severe protein-calorie malnutrition.    This patient is being managed with:   Diet NPO with Tube Feed-  Tube Feeding Modality: Nasogastric  Glucerna 1.5 Tyson (GLUCERNA1.5RTH)  Total Volume for 24 Hours (mL): 1200  Continuous  Starting Tube Feed Rate {mL per Hour}: 50  Until Goal Tube Feed Rate (mL per Hour): 50  Tube Feed Duration (in Hours): 24  Tube Feed Start Time: 17:00  Entered: Jun 30 2022 10:29PM

## 2022-07-01 NOTE — PROGRESS NOTE ADULT - NS ATTEND AMEND GEN_ALL_CORE FT
Dr. Martin (Attending Physician)  Status epilepticus improved MS with current regimen fosphenytoin level appropriate yesterday and fycompa  HTN increased hydralazine yesterday, will start cardura today  DDRT Cr 1.5 -600 yesterday, mild hyperkalemia shifted overnight on lokelma, will start free water today 200 q8h  started back on Eliquis yesterday for RUE/RIJ dvt  transplant ppx abx, off treatment for meningoencephalitis  start mmf today  yaquelin luciano

## 2022-07-01 NOTE — PROGRESS NOTE ADULT - SUBJECTIVE AND OBJECTIVE BOX
[facility]ROLOGY [facility]ACT[facility]   MD NINA MASON MD RUORU WONG, PA    TEL:  FROM 9 AM to 5 PM ---OFFICE: [phone]    FROM 5 PM - 9 AM PLEASE CALL ANSWERING SERVICE: [phone]    RENAL FOLLOW UP NOTE--Date of Service 07-01-22 @ 08:17  --------------------------------------------------------------------------------  HPI:      Pt seen and examined at bedside in SICU      PAST HISTORY  --------------------------------------------------------------------------------  No significant changes to PMH, PSH, FHx, SHx, unless otherwise noted    ALLERGIES & MEDICATIONS  --------------------------------------------------------------------------------  Allergies    No Known Allergies    Intolerances      Standing Inpatient Medications  amLODIPine   Tablet 10 milliGRAM(s) Oral <User Schedule>  apixaban 5 milliGRAM(s) Enteral Tube every 12 hours  atorvastatin 40 milliGRAM(s) Oral at bedtime  chlorhexidine 2% Cloths 1 Application(s) Topical <User Schedule>  epoetin cortney-epbx (RETACRIT) Injectable 79611 Unit(s) SubCutaneous every 7 days  fluconAZOLE IVPB 200 milliGRAM(s) IV Intermittent every 24 hours  fosphenytoin IVPB 100 milliGRAM(s) PE IV Intermittent every 8 hours  hydrALAZINE 100 milliGRAM(s) Oral every 8 hours  insulin lispro (ADMELOG) corrective regimen sliding scale   SubCutaneous every 6 hours  levothyroxine Injectable 40 MICROGram(s) IV Push at bedtime  pantoprazole   Suspension 40 milliGRAM(s) Oral daily  perampanel 4 milliGRAM(s) Oral at bedtime  polyethylene glycol 3350 17 Gram(s) Oral daily  predniSONE  Solution 5 milliGRAM(s) Oral daily  senna Syrup 10 milliLiter(s) Oral daily  sodium zirconium cyclosilicate 10 Gram(s) Oral two times a day  trimethoprim  40 mG/sulfamethoxazole 200 mG Suspension 80 milliGRAM(s) Oral <User Schedule>  valGANciclovir 50 mG/mL Oral Solution 450 milliGRAM(s) Oral daily    PRN Inpatient Medications  acetaminophen    Suspension .. 650 milliGRAM(s) Enteral Tube every 6 hours PRN  hydrALAZINE Injectable 10 milliGRAM(s) IV Push three times a day PRN      REVIEW OF SYSTEMS  --------------------------------------------------------------------------------  General: no fever  MSK: no edema     VITALS/PHYSICAL EXAM  --------------------------------------------------------------------------------  T(C): 37.2 (07-01-22 @ 03:00), Max: 37.5 (06-30-22 @ 11:00)  HR: 56 (07-01-22 @ 08:00) (52 - 65)  BP: 167/74 (06-30-22 @ 20:00) (167/74 - 167/74)  RR: 18 (07-01-22 @ 08:00) (12 - 24)  SpO2: 98% (07-01-22 @ 08:00) (98% - 100%)  Wt(kg): --        06-30-22 @ 07:01  -  07-01-22 @ 07:00  --------------------------------------------------------  IN: 1574 mL / OUT: 2185 mL / NET: -611 mL    07-01-22 @ 07:01  -  07-01-22 @ 08:17  --------------------------------------------------------  IN: 50 mL / OUT: 0 mL / NET: 50 mL      Physical Exam:  	Gen: NAD  	HEENT: MMM, NGT  	Pulm: CTA B/L  	CV: S1S2  	Abd: Soft, +BS  	Ext: No LE edema B/L                      Neuro: Awake   	Skin: Warm and Dry   	Vascular access: NO HD catheter            no  garrett  LABS/STUDIES  --------------------------------------------------------------------------------              9.6    8.03  >-----------<  398      [07-01-22 @ 00:09]              29.6     138  |  101  |  34  ----------------------------<  78      [07-01-22 @ 05:52]  5.1   |  28  |  1.50        Ca     8.8     [07-01-22 @ 05:52]      Mg     2.4     [07-01-22 @ 05:52]      Phos  3.1     [07-01-22 @ 05:52]    TPro  6.1  /  Alb  2.4  /  TBili  0.2  /  DBili  x   /  AST  19  /  ALT  12  /  AlkPhos  128  [07-01-22 @ 05:52]    PT/INR: PT 16.6 , INR 1.44       [07-01-22 @ 05:52]  PTT: 39.4       [07-01-22 @ 05:52]      Creatinine Trend:  SCr 1.50 [07-01 @ 05:52]  SCr 1.47 [07-01 @ 00:09]  SCr 1.52 [06-30 @ 18:21]  SCr 1.44 [06-30 @ 05:49]  SCr 1.34 [06-30 @ 00:15]    Urinalysis - [06-26-22 @ 06:25]      Color Light Yellow / Appearance Clear / SG 1.015 / pH 7.0      Gluc Negative / Ketone Negative  / Bili Negative / Urobili Negative       Blood Negative / Protein Trace / Leuk Est Negative / Nitrite Negative      RBC 3 / WBC 1 / Hyaline 0 / Gran  / Sq Epi  / Non Sq Epi 1 / Bacteria Negative    Urine Creatinine 52      [06-25-22 @ 14:10]  Urine Protein 24      [06-25-22 @ 14:10]  Urine Sodium 68      [06-25-22 @ 14:10]  Urine Osmolality 375      [06-25-22 @ 14:10]    Iron 17, TIBC 171, %sat 10      [05-02-22 @ 13:03]  Ferritin 1505      [05-02-22 @ 13:05]  PTH -- (Ca 9.2)      [02-05-22 @ 10:13]   294  HbA1c 6.0      [02-21-20 @ 08:53]      Syphilis Screen (Treponema Pallidum Ab) Negative      [06-27-22 @ 02:00]

## 2022-07-02 NOTE — PROGRESS NOTE ADULT - ASSESSMENT
Patient is a 67 y/o male w/ a PMHx of CAD s/p CABG (2020), AF on Eliquis c/b CVA (2019) c/b seizures, HTN, HLD, DM type II, hypothyroidism, GI bleed due to a duodenal ulcer (2/2022), and ESRD s/p DDRT (4/21/22) c/b DGF requiring HD (last session 4/29/22) & large perinephric hematoma s/p evacuation w/ revision of arterial anastomosis (5/2/22) who presented on 6/10 for status epilepticus. Course complicated by hemothorax, s/p VATS, now with recurrent refractory seizures secondary to tacrolimus toxicity  vs PRES syndrome vs meningitis and intubated for airway protection x3 during admission. Pt extubated 6/28 and remains in SICU for further assessment and management.       Neuro: Seizures with concern of status epilepticus 2/2 PRES syndrome vs CNS infection   - Intermittently follows commands; A/O x1-2 to name/year   - No seizures since 6/26; EEG d/darren ( 6/28)   - Continue Antiepileptics Perampanel 4mg  and [id number] q8  ; d/darren Valproic Acid 2/2 ongoing lethargy   - will draw Phenytoin level prior to 7/2 AM dose w/LFTS as per Neuro   - CSF 6/29 neg for infectious etiology w/ protein 46, glucose 125 2/2 s/p status epilepticus. CSF cx and HSV PCR neg. Will f/u VDRL, oligoclonal bands.   -MRI ( 6/27) showed encephalomalacia 2/2 htn encephalopathy vs ischemic changes. Unchanged from previous, no change in management per Neuro   -Pain Control with tylenol PRN    Resp: s/p VATS evacuation of hemothorax now intubated for airway protection  - Extubated 6/28, tolerating RA; Maintain SpO2 >92%   - Chest tubes removed 6/18  -f/u AM CXR for RML opacification     CVS: Hypertension, Afib; Bradycardia now resolved   - Continue Amlodipine  - Systolics still >150 with hydral 100mg, amlodopine. Cardura added   - Continue Atorvastatin 40mg    GI: TF  - Keofeed continue TF Jevity 1.5 goal 50cc/hr at goal  - Bowel regimen with senna & Miralax  - Continue home Protonix    Renal: ESRD s/p DDRT (4/21/22) c/b DGF requiring HD (last session 4/29/22) & large perinephric hematoma s/p evacuation w/ revision of arterial anastomosis (5/2/22)   - ANA 2/2 hemodynamic fluctuations improving   - making adequate UO  - Miranda in place for strict I/Os   - (6/30)diuresed 40mgx1 s/p transfusion with good UO response   - Hyperkalemic Lokeelma BID per Transplant   - Continue prednisone, Retacrit, mycophenolic acid and Belatacept as per transplant     Heme: RUE and RIJ DVT + A/C for afib  - transitioned to Eliquis s/p LP   - 6/30: transfused 1U PRBC Hb responded 7.5-->9.6  - Monitor CBC and coags  - SCD's    ID: ?sepsis 2/2 asp pna vs cns infx    - Bcx ( 6/26) MRSE; f/u repeat BCx ( 6/27) neg  - Combi-Cath ( 6/26) negative, MRSA neg, UCx ( 6/26) neg   - Viral Workup: CMV, EBV, HSV 6, Adenovirus, Hep C, syphillis, Parovirus all negative   - s/p LP (6/29) negative   - Abx: s/p Vanco ( 6/26-27), s/p Meropenem for hx of ESBL in urine (6/26-6/29) ; continue fluconazole for ppx   - Ppx Antibc: s/p Ganciclovir, continue Valcyte, as per transplant; bactrim d/darren 2/2 hyperK  - F/u G6PD level     Endo: hx of DM, Hypothyroidism  - HbA1c 6.6 % ( 4/224)  - Monitor glucose, mISS q4 hr + NPH 2U q8hrs, as per Endo   - Continue levothyroxine Patient is a 65 y/o male w/ a PMHx of CAD s/p CABG (2020), AF on Eliquis c/b CVA (2019) c/b seizures, HTN, HLD, DM type II, hypothyroidism, GI bleed due to a duodenal ulcer (2/2022), and ESRD s/p DDRT (4/21/22) c/b DGF requiring HD (last session 4/29/22) & large perinephric hematoma s/p evacuation w/ revision of arterial anastomosis (5/2/22) who presented on 6/10 for status epilepticus. Course complicated by hemothorax, s/p VATS, now with recurrent refractory seizures secondary to tacrolimus toxicity  vs PRES syndrome vs meningitis and intubated for airway protection x3 during admission. Pt extubated 6/28 and remains in SICU for further assessment and management.       Neuro: Seizures with concern of status epilepticus 2/2 PRES syndrome vs CNS infection   - Intermittently follows commands; A/O x1-2 to name/year   - No seizures since 6/26; EEG d/darren ( 6/28)   - Continue Antiepileptics Perampanel 4mg  and [id number] q8  ; d/darren Valproic Acid 2/2 ongoing lethargy   - will draw Phenytoin level prior to 7/2 AM dose w/LFTS as per Neuro   - CSF 6/29 neg for infectious etiology w/ protein 46, glucose 125 2/2 s/p status epilepticus. CSF cx and HSV PCR neg. Will f/u VDRL, oligoclonal bands.   -MRI ( 6/27) showed encephalomalacia 2/2 htn encephalopathy vs ischemic changes. Unchanged from previous, no change in management per Neuro   -Pain Control with tylenol PRN    Resp: s/p VATS evacuation of hemothorax now intubated for airway protection  - Extubated 6/28, tolerating RA; Maintain SpO2 >92%   - Chest tubes removed 6/18  -f/u AM CXR for RML opacification     CVS: Hypertension, Afib; Bradycardia now resolved   - Continue Amlodipine  - Systolics still >150 with hydral 100mg, amlodopine. Cardura added   - Continue Atorvastatin 40mg    GI: TF  - Keofeed continue TF Jevity 1.5 goal 50cc/hr at goal  - Bowel regimen with senna & Miralax  - Continue home Protonix    Renal: ESRD s/p DDRT (4/21/22) c/b DGF requiring HD (last session 4/29/22) & large perinephric hematoma s/p evacuation w/ revision of arterial anastomosis (5/2/22)   - ANA 2/2 hemodynamic fluctuations improving   - making adequate UO  - Miranda in place for strict I/Os   - (6/30)diuresed 40mgx1 s/p transfusion with good UO response   - lokelma d/darren   - Continue prednisone, Retacrit, mycophenolic acid and Belatacept as per transplant     Heme: RUE and RIJ DVT + A/C for afib  - transitioned to Eliquis s/p LP   - 6/30: transfused 1U PRBC Hb responded 7.5-->9.6  - Monitor CBC and coags  - SCD's    ID: ?sepsis 2/2 asp pna vs cns infx    - Bcx ( 6/26) MRSE; f/u repeat BCx ( 6/27) neg  - Combi-Cath ( 6/26) negative, MRSA neg, UCx ( 6/26) neg   - Viral Workup: CMV, EBV, HSV 6, Adenovirus, Hep C, syphillis, Parovirus all negative   - s/p LP (6/29) negative   - Abx: s/p Vanco ( 6/26-27), s/p Meropenem for hx of ESBL in urine (6/26-6/29) ; continue fluconazole for ppx   - Ppx Antibc: s/p Ganciclovir, continue Valcyte, as per transplant; bactrim d/darren 2/2 hyperK  - F/u G6PD level     Endo: hx of DM, Hypothyroidism  - HbA1c 6.6 % ( 4/224)  - Monitor glucose, mISS q4 hr + NPH 2U q8hrs, as per Endo   - Continue levothyroxine

## 2022-07-02 NOTE — PROGRESS NOTE ADULT - ASSESSMENT
66M with h/o DM type II, HTN, CAD s/p CABG in 2020, Afib on Eliquis, CVA in 2019 due to Afib, Seizure d/o (last episode was on 4/8/22), h/o GI bleed in 2/2022 EGD with duodenal ulcer and ESRD on HD s/p R DDRT from DCD donor on 4/21/22 complicated by DGF requiring HD until 4/29/22 now with good graft function who presented with status epilepticus in setting of UTI/antibiotics and sub-therapeutic valproic acid level     Seizure Disorder  - repeat MRI head 6/27 with less concerns for PRES, ischemic changes  - remains seizure free  - Antiepileptic regimen per Neurology, appreciate recs. please follow closely with team  - Keep Mag > 2  - infectious w/u negative so far, off ABX.  Remains on Fluc.  Transplant ID following, appreciate recs    R DCD DDRT (ureter stented) 4/21/22  - renal function stable  - Strict I/Os, replace tucker prn, can add Flomax or Proscar prn  - TFs to goal. may consider PEG next week if mental status remains unchanged  - will remove stent prior to discharge    Immunosuppression:   - Belatacept #1 6/23.  #2 6/30  - continue MMF 500mg BID  - continue Pred 5  - PPx:  continue Bactrim MWF and PPI    DM  - on Lantus/Lispro, Endocrine following     AFib:  - continue Retacrit weekly  - continue Eliquis  - rate controlled    R IJ DVT  - continue Eliquis    HTN:  - continue Norvasc/Hydralazine/Cardura    Dispo  - continue excellent SICU care

## 2022-07-02 NOTE — PROGRESS NOTE ADULT - SUBJECTIVE AND OBJECTIVE BOX
--------------------------------------------------------------------------------  Chief Complaint:  Disoriented  24 hour events/subjective:  Reviewed      PAST HISTORY  --------------------------------------------------------------------------------  No significant changes to PMH, PSH, FHx, SHx, unless otherwise noted    ALLERGIES & MEDICATIONS  --------------------------------------------------------------------------------  Allergies    No Known Allergies    Intolerances      Standing Inpatient Medications  amLODIPine   Tablet 10 milliGRAM(s) Oral <User Schedule>  apixaban 5 milliGRAM(s) Enteral Tube every 12 hours  atorvastatin 40 milliGRAM(s) Oral at bedtime  chlorhexidine 2% Cloths 1 Application(s) Topical <User Schedule>  doxazosin 2 milliGRAM(s) Oral daily  epoetin cortney-epbx (RETACRIT) Injectable 76402 Unit(s) SubCutaneous every 7 days  fluconAZOLE IVPB 200 milliGRAM(s) IV Intermittent every 24 hours  fosphenytoin IVPB 100 milliGRAM(s) PE IV Intermittent every 8 hours  hydrALAZINE 100 milliGRAM(s) Oral every 8 hours  insulin lispro (ADMELOG) corrective regimen sliding scale   SubCutaneous every 8 hours  insulin NPH human recombinant 2 Unit(s) SubCutaneous every 8 hours  levothyroxine Injectable 40 MICROGram(s) IV Push at bedtime  magnesium hydroxide Suspension 30 milliLiter(s) Oral once  mycophenolate mofetil Suspension 500 milliGRAM(s) Oral every 12 hours  pantoprazole   Suspension 40 milliGRAM(s) Oral daily  perampanel 4 milliGRAM(s) Oral at bedtime  polyethylene glycol 3350 17 Gram(s) Oral two times a day  predniSONE  Solution 5 milliGRAM(s) Oral daily  senna Syrup 10 milliLiter(s) Oral daily  valGANciclovir 50 mG/mL Oral Solution 450 milliGRAM(s) Oral daily    PRN Inpatient Medications  acetaminophen    Suspension .. 650 milliGRAM(s) Enteral Tube every 6 hours PRN  hydrALAZINE Injectable 10 milliGRAM(s) IV Push three times a day PRN      REVIEW OF SYSTEMS  --------------------------------------------------------------------------------  Gen: no fevers/chills,   Skin: No rashes     Respiratory: No dyspnea, cough,      GI: No  diarrhea,   vomiting  Transplant: No pain  : urinary retention  Neuro: No  seizures; Disoriented  Psych: No significant nervousness, anxiety, stress, depression    All other systems were reviewed and are negative, except as noted.    VITALS/PHYSICAL EXAM  --------------------------------------------------------------------------------  T(C): 36 (07-02-22 @ 07:00), Max: 36.1 (07-01-22 @ 15:00)  HR: 59 (07-02-22 @ 09:00) (51 - 62)     RR: 19 (07-02-22 @ 09:00) (10 - 24)  SpO2: 97% (07-02-22 @ 09:00) (90% - 98%)           07-01-22 @ 07:01  -  07-02-22 @ 07:00  --------------------------------------------------------  IN: 1202 mL / OUT: 1120 mL / NET: 82 mL    07-02-22 @ 07:01  -  07-02-22 @ 11:16  --------------------------------------------------------  IN: 400 mL / OUT: 600 mL / NET: -200 mL      Physical Exam:  	Gen: Disoriented  	HEENT: NG tube  	Pulm: CTA B/L  	CV: no gallop/no rub  	Abd: +BS, soft, nontender/nondistended                      Transplant: No tenderness  	: No suprapubic tenderness  	UE: moves both   	LE: moves both  	Neuro: responds to simple commands, disoriented  	    LABS/STUDIES  --------------------------------------------------------------------------------              8.9    5.84  >-----------<  351      [07-01-22 @ 17:43]              27.9     141  |  101  |  37  ----------------------------<  166      [07-02-22 @ 03:23]  5.1   |  29  |  1.48        Ca     8.7     [07-02-22 @ 03:23]      Mg     2.3     [07-01-22 @ 17:43]      Phos  3.0     [07-01-22 @ 17:43]    TPro  6.1  /  Alb  2.8  /  TBili  0.2  /  DBili  x   /  AST  17  /  ALT  12  /  AlkPhos  129  [07-02-22 @ 03:23]    PT/INR: PT 16.6 , INR 1.44       [07-01-22 @ 05:52]  PTT: 39.4       [07-01-22 @ 05:52]      Creatinine Trend:  SCr 1.48 [07-02 @ 03:23]  SCr 1.42 [07-01 @ 17:43]  SCr 1.50 [07-01 @ 05:52]  SCr 1.47 [07-01 @ 00:09]  SCr 1.52 [06-30 @ 18:21]           Urinalysis - [06-26-22 @ 06:25]      Color Light Yellow / Appearance Clear / SG 1.015 / pH 7.0      Gluc Negative / Ketone Negative  / Bili Negative / Urobili Negative       Blood Negative / Protein Trace / Leuk Est Negative / Nitrite Negative      RBC 3 / WBC 1 / Hyaline 0 / Gran  / Sq Epi  / Non Sq Epi 1 / Bacteria Negative    Urine Creatinine 52      [06-25-22 @ 14:10]  Urine Protein 24      [06-25-22 @ 14:10]  Urine Sodium 68      [06-25-22 @ 14:10]  Urine Osmolality 375      [06-25-22 @ 14:10]    Iron 17, TIBC 171, %sat 10      [05-02-22 @ 13:03]  Ferritin 1505      [05-02-22 @ 13:05]  PTH -- (Ca 9.2)      [02-05-22 @ 10:13]   294  HbA1c 6.0      [02-21-20 @ 08:53]      Syphilis Screen (Treponema Pallidum Ab) Negative      [06-27-22 @ 02:00]

## 2022-07-02 NOTE — PROGRESS NOTE ADULT - CRITICAL CARE ATTENDING COMMENT
Patient seen and examined on AM SICU rounds  Awake, alert, Phenytoin level=17 this AM  Oxygenating well  Hemodynamically stable  Creatinine stable, immunosuppression as per transplant service

## 2022-07-02 NOTE — PROGRESS NOTE ADULT - TIME BILLING
Kidney Transplant recipient with functioning allograft  Cerebrovascular disease, HTN, DM, Controlled seizures, Delirium  Anemia  Urinary retention  Comorbidities reviewed.  Patient seen, examined and reviewed available clinical and lab data    Reviewed immunosuppression and allograft function    Reviewed medication regimen for glycemic control and blood pressure control  Suggestions:  Continue current immunosuppression  Neurology follow up  Likely will need indwelling Miranda catheter in view of high volume on straight cath  Physical therapy follow up  Will follow  I was present during and reviewed clinical and lab data as well as assessment and plan as documented  . Please contact if any additional questions with any change in clinical condition or on availability of any additional information or reports.

## 2022-07-02 NOTE — PROGRESS NOTE ADULT - SUBJECTIVE AND OBJECTIVE BOX
Transplant Surgery Multidisciplinary Progress Note   --------------------------------------------------------------  R DCD DDRT    4/21/22    Present: Patient seen and examined with multidisciplinary team including Transplant Surgeon: Dr. David,  Nephrologist: WALDO Kan and SICU team during am rounds.  Disciplines not in attendance will be notified of the plan.     HPI: 66M with PMH: DM type II, HTN, CAD s/p CABG in 2020, AFib on Eliquis, CVA in 2019 due to Afib, Seizure d/o following CVA, last episode was on 4/8/22, h/o GIB in 2/2022 EGD with duodenal ulcer.  ESRD due to DM was on HD since 2019.     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  Donor was 58 , KDPI 82%, DCD, single vessels and ureter, HLA mismatch 1, 2, 2. No DSA, cPRA 0%. 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 Re-admitted in May for anemia in the setting of large perinephric hematoma s/p evacuation and repair of arterial anastomosis on 5/2/22. Intra operative biopsy showed no rejection, Creatinine ranging ~2mg/dL.    In Rehab: He was recently dx with klebsiella UTI rx with ertapenem - then switched to Levaquin day #6.    He also had parainfluenza pneumonia. Was at rehab progressing well.      Hospital course:  - 6/10: transferred from rehab facility for seizure status epilepticus. Intubated for respiratory protection and transferred to MICU.  EEG showed no seizure activity. Neuro consulted.   - 6/11: Extubated. Found to have R pleural effusion. s/p Thoracentesis 1.3L. Eliquis changed to heparin drip.  Post procedure drop in H&H. 5u PRBC, 2 u FFP.    - 6/11 evening: Transferred to SICU for further care in setting of hypoxia, significant R pleural effusion, hgb 6 and hemodynamic instability  - 6/11 Intubated at 9pm and sedated on Precedex.  CT placed, 600ml blood drained.  1L total overnight, started pressors  - 6/11 Received Protamine for PTT >100 (was on Heparin drip started for AF), 2 u FFP and 5u PRBC total  - 6/13 s/p VATS 2.2L old blood removed; second chest tube placed  - 6/18-19: chest tubes removed  - 6/21: ?PRES on MRI, off Envarsus, switched to Belatacept 6/23  - 6/25: Tx to SICU for refractory seizures    Interval Events:  -Afebrile, VSS  -AAOx1, intermittently following commands, still confused. calm.  -failed TOV this AM  -tolerating TFs to goal  -remains seizure free since 6/27 on current regimen    Immunosuppression:   Induction:  Thymoglobulin                                        Maintenance:  -Belatacept #1 6/23, #2 dose 6/30  -MMF restarted at 500BID 7/1,  continue Pred 5  -Ongoing monitoring for signs of rejection.    Potential Discharge date: pending clinical improvement  Education:  Medications  Plan of care:  See Below      MEDICATIONS  (STANDING):  amLODIPine   Tablet 10 milliGRAM(s) Oral <User Schedule>  apixaban 5 milliGRAM(s) Enteral Tube every 12 hours  atorvastatin 40 milliGRAM(s) Oral at bedtime  chlorhexidine 2% Cloths 1 Application(s) Topical <User Schedule>  doxazosin 2 milliGRAM(s) Oral daily  epoetin cortney-epbx (RETACRIT) Injectable 41597 Unit(s) SubCutaneous every 7 days  fluconAZOLE IVPB 200 milliGRAM(s) IV Intermittent every 24 hours  fosphenytoin IVPB 100 milliGRAM(s) PE IV Intermittent every 8 hours  hydrALAZINE 100 milliGRAM(s) Oral every 8 hours  insulin lispro (ADMELOG) corrective regimen sliding scale   SubCutaneous every 8 hours  insulin NPH human recombinant 2 Unit(s) SubCutaneous every 8 hours  levothyroxine Injectable 40 MICROGram(s) IV Push at bedtime  magnesium hydroxide Suspension 30 milliLiter(s) Oral once  mycophenolate mofetil Suspension 500 milliGRAM(s) Oral every 12 hours  pantoprazole   Suspension 40 milliGRAM(s) Oral daily  perampanel 4 milliGRAM(s) Oral at bedtime  polyethylene glycol 3350 17 Gram(s) Oral two times a day  predniSONE  Solution 5 milliGRAM(s) Oral daily  senna Syrup 10 milliLiter(s) Oral daily  valGANciclovir 50 mG/mL Oral Solution 450 milliGRAM(s) Oral daily    MEDICATIONS  (PRN):  acetaminophen    Suspension .. 650 milliGRAM(s) Enteral Tube every 6 hours PRN Mild Pain (1 - 3)  hydrALAZINE Injectable 10 milliGRAM(s) IV Push three times a day PRN SBP >160        Vital Signs Last 24 Hrs  T(C): 36 (02 Jul 2022 07:00), Max: 36.1 (01 Jul 2022 15:00)  T(F): 96.8 (02 Jul 2022 07:00), Max: 97 (01 Jul 2022 15:00)  HR: 59 (02 Jul 2022 09:00) (51 - 62)  BP: --  BP(mean): --  RR: 19 (02 Jul 2022 09:00) (10 - 24)  SpO2: 97% (02 Jul 2022 09:00) (90% - 98%)    I&O's Summary    01 Jul 2022 07:01  -  02 Jul 2022 07:00  --------------------------------------------------------  IN: 1202 mL / OUT: 1120 mL / NET: 82 mL    02 Jul 2022 07:01  -  02 Jul 2022 11:22  --------------------------------------------------------  IN: 400 mL / OUT: 600 mL / NET: -200 mL                              8.9    5.84  )-----------( 351      ( 01 Jul 2022 17:43 )             27.9     07-02    141  |  101  |  37<H>  ----------------------------<  166<H>  5.1   |  29  |  1.48<H>    Ca    8.7      02 Jul 2022 03:23  Phos  3.0     07-01  Mg     2.3     07-01    TPro  6.1  /  Alb  2.8<L>  /  TBili  0.2  /  DBili  x   /  AST  17  /  ALT  12  /  AlkPhos  129<H>  07-02          Culture - Fungal, CSF (collected 06-29-22 @ 13:09)  Source: .CSF CSF  Preliminary Report (06-30-22 @ 09:52):    Testing in progress    Culture - CSF with Gram Stain (collected 06-29-22 @ 13:09)  Source: .CSF CSF  Gram Stain (06-29-22 @ 18:00):    No polymorphonuclear leukocytes seen    No organisms seen    by cytocentrifuge  Preliminary Report (06-30-22 @ 15:42):    No growth to date.    Culture - Acid Fast - CSF (collected 06-29-22 @ 13:09)  Source: .CSF CSF    Culture - Nocardia (collected 06-28-22 @ 05:06)  Source: .Other Other  Preliminary Report (06-30-22 @ 11:38):    No Nocardia isolated    Culture - Blood (collected 06-27-22 @ 23:27)  Source: .Blood Blood-Peripheral  Preliminary Report (06-29-22 @ 04:02):    No growth to date.    Culture - Blood (collected 06-27-22 @ 23:27)  Source: .Blood Blood-Peripheral  Preliminary Report (06-29-22 @ 04:02):    No growth to date.    Culture - Bronchial (collected 06-26-22 @ 18:08)  Source: Combi-Cath Combi-Cath  Gram Stain (06-27-22 @ 11:17):    Few polymorphonuclear leukocytes per low power field    Rare Squamous Epithelial Cells per low power field    Rare Gram Positive Rods per oil power field  Final Report (06-29-22 @ 08:38):    Normal Respiratory Rachel present    Culture - Blood (collected 06-26-22 @ 09:01)  Source: .Blood Blood-Peripheral  Final Report (07-01-22 @ 15:01):    No Growth Final    Culture - Blood (collected 06-26-22 @ 08:14)  Source: .Blood Blood-Peripheral  Gram Stain (06-27-22 @ 18:23):    Growth in anaerobic bottle: Gram positive cocci in pairs  Final Report (06-28-22 @ 18:26):    Growth in anaerobic bottle: Staphylococcus epidermidis    Coag Negative Staphylococcus    Single set isolate, possible contaminant. Contact    Microbiology if susceptibility testing clinically    indicated.    ***Blood Panel PCR results on this specimen are available    approximately 3 hours after the Gram stain result.***    Gram stain, PCR, and/or culture results may not always    correspond due to difference in methodologies.    ************************************************************    This PCR assay was performed by multiplex PCR. This    Assay tests for 66 bacterial and resistance gene targets.    Please refer to the [facility] test directory    at [email or web address] for details.  Organism: Blood Culture PCR (06-28-22 @ 18:26)  Organism: Blood Culture PCR (06-28-22 @ 18:26)    Culture - Urine (collected 06-26-22 @ 06:25)  Source: Catheterized Catheterized  Final Report (06-27-22 @ 12:20):    No growth                    REVIEW OF SYSTEMS  --------------------------------------------------------------------------------  unable to assess, +delirium        PHYSICAL EXAM:  Constitutional: Well developed / well nourished  Eyes: Anicteric, PERRLA  ENMT: nc/at  Neck: supple  Respiratory: CTA   Cardiovascular: RRR  Gastrointestinal: Soft, non distended, NT, incision healed   Genitourinary: tucker   Extremities: SCD's in place and working bilaterally, LUE pitting edema  Vascular: Palpable dp pulses bilaterally  Neurological: more awake today, AAOx1, intermittently following commands, no tremors noted  Skin: no rashes, ulcerations or lesions  Musculoskeletal: Moving all extremities, global weakness  Psychiatric: calm, periods of mild agitation

## 2022-07-02 NOTE — PROGRESS NOTE ADULT - SUBJECTIVE AND OBJECTIVE BOX
HISTORY  Patient is a 67 y/o male w/ a PMHx of CAD s/p CABG (2020), AF on Eliquis c/b CVA (2019) c/b seizures, HTN, HLD, DM type II, hypothyroidism, GI bleed due to a duodenal ulcer (2/2022), and ESRD s/p DDRT (4/21/22) c/b DGF requiring HD (last session 4/29/22) & large perinephric hematoma s/p evacuation w/ revision of arterial anastomosis (5/2/22) who presented on 6/10/22 for status epilepticus. Patient was intubated for airway protection and transferred to MICU where pt loaded with valproic acid and continued home Keppra w/ resolution of his seizures on EEG, patient was extubated on 6/11. Course c/b a large right pleural effusion s/p thoracentesis performed on 67/11 with 1.3 L of serosanguineous fluid . Subsequently, pt started on a heparin infusion for his A. Fib post-procedure. Pt's H&H decreased and right lung looked whiteout on CXR concerning for a hemothorax. Patient was transferred to SICU for further management.    While in SICU, pt reintubated for airway protection, transfused with blood products, and reversed w/ protamine. A right 28 Fr chest tube was placed on 6/11 w/ a significant amount of sanguinous fluid drained. Patient went to OR on 6/15 with thoracic for Right VATS and a second chest tube placement. Patient recovered well and was transferred to the floor on 6/17 and chest tubes were removed 06/18. Hospital course further c/b 10-11 seizures per hour and transferred to SICU for monitoring. On 6/26, pt became less responsive with worsening lethargy and was intubated for a third time for airway protection. Pt's refractory seizures are likely 2/2 Tacrolimus toxicty with concern for PRES syndrome Pt without seizures for 48hrs, EEG removed, and pt extubated 6/28. Pt remains in SICU for further assessment and management.     24 HOUR EVENTS:  no acute events  SUBJECTIVE/ROS:  [ ] A ten-point review of systems was otherwise negative except as noted.  Pt has no acute complaints.       NEURO  RASS: 0    GCS: 12-13       Exam: awake, alert, oriented to self but waxes/wanes w/orientation to year but not to place.   Meds: acetaminophen    Suspension .. 650 milliGRAM(s) Enteral Tube every 6 hours PRN Mild Pain (1 - 3)  fosphenytoin IVPB 100 milliGRAM(s) PE IV Intermittent every 8 hours  perampanel 4 milliGRAM(s) Oral at bedtime    [x] Adequacy of sedation and pain control has been assessed and adjusted      RESPIRATORY  RR: 23 (07-01-22 @ 21:00) (13 - 23)  SpO2: 97% (07-01-22 @ 21:00) (95% - 100%)  Wt(kg): --  Exam: unlabored, clear to auscultation bilaterally        CARDIOVASCULAR  HR: 60 (07-01-22 @ 21:00) (51 - 60)  BP: 156/70 (07-01-22 @ 08:00) (156/70 - 156/70)  BP(mean): 101 (07-01-22 @ 08:00) (101 - 101)  ABP: 151/50 (07-01-22 @ 21:00) (138/44 - 173/57)  ABP(mean): 85 (07-01-22 @ 21:00) (73 - 97)  Wt(kg): --  CVP(cm H2O): --      Exam: regular rate and rhythm  Cardiac Rhythm: sinus  Perfusion     [x]Adequate   [ ]Inadequate  Mentation   [x]Normal       [ ]Reduced  Extremities  [x]Warm         [ ]Cool  Volume Status [ ]Hypervolemic [x]Euvolemic [ ]Hypovolemic  Meds: amLODIPine   Tablet 10 milliGRAM(s) Oral <User Schedule>  doxazosin 2 milliGRAM(s) Oral daily  hydrALAZINE 100 milliGRAM(s) Oral every 8 hours  hydrALAZINE Injectable 10 milliGRAM(s) IV Push three times a day PRN SBP >160        GI/NUTRITION  Exam: soft, nontender, nondistended, incision C/D/I  Diet: TF at goal   Meds: pantoprazole   Suspension 40 milliGRAM(s) Oral daily  polyethylene glycol 3350 17 Gram(s) Oral daily  senna Syrup 10 milliLiter(s) Oral daily      GENITOURINARY  I&O's Detail    06-30 @ 07:01  -  07-01 @ 07:00  --------------------------------------------------------  IN:    Enteral Tube Flush: 150 mL    Glucerna 1.5: 1200 mL    Heparin: 24 mL    IV PiggyBack: 100 mL    IV PiggyBack: 100 mL  Total IN: 1574 mL    OUT:    Indwelling Catheter - Urethral (mL): 2185 mL  Total OUT: 2185 mL    Total NET: -611 mL      07-01 @ 07:01  -  07-02 @ 01:03  --------------------------------------------------------  IN:    Enteral Tube Flush: 200 mL    Free Water: 200 mL    Glucerna 1.5: 600 mL    IV PiggyBack: 102 mL    IV PiggyBack: 100 mL  Total IN: 1202 mL    OUT:    Indwelling Catheter - Urethral (mL): 1120 mL  Total OUT: 1120 mL    Total NET: 82 mL          07-01    136  |  101  |  36<H>  ----------------------------<  196<H>  5.0   |  28  |  1.42<H>    Ca    8.3<L>      01 Jul 2022 17:43  Phos  3.0     07-01  Mg     2.3     07-01    TPro  6.1  /  Alb  2.4<L>  /  TBili  0.2  /  DBili  x   /  AST  19  /  ALT  12  /  AlkPhos  128<H>  07-01    [x] Miranda catheter, indication: strict I/Os   Meds:       HEMATOLOGIC  Meds: apixaban 5 milliGRAM(s) Enteral Tube every 12 hours    [x] VTE Prophylaxis                        8.9    5.84  )-----------( 351      ( 01 Jul 2022 17:43 )             27.9     PT/INR - ( 01 Jul 2022 05:52 )   PT: 16.6 sec;   INR: 1.44 ratio         PTT - ( 01 Jul 2022 05:52 )  PTT:39.4 sec  Transfusion     [ ] PRBC   [ ] Platelets   [ ] FFP   [ ] Cryoprecipitate      INFECTIOUS DISEASES  WBC Count: 5.84 K/uL (07-01 @ 17:43)    RECENT CULTURES:  Specimen Source: .CSF CSF  Date/Time: 06-29 @ 13:09  Culture Results:   No growth to date.  Gram Stain:   No polymorphonuclear leukocytes seen  No organisms seen  by cytocentrifuge  Organism: --  Specimen Source: .Other Other  Date/Time: 06-28 @ 05:06  Culture Results:   No Nocardia isolated  Gram Stain: --  Organism: --  Specimen Source: .Blood Blood-Peripheral  Date/Time: 06-27 @ 23:27  Culture Results:   No growth to date.  Gram Stain: --  Organism: --    Meds: epoetin cortney-epbx (RETACRIT) Injectable 45263 Unit(s) SubCutaneous every 7 days  fluconAZOLE IVPB 200 milliGRAM(s) IV Intermittent every 24 hours  mycophenolate mofetil Suspension 500 milliGRAM(s) Oral every 12 hours  valGANciclovir 50 mG/mL Oral Solution 450 milliGRAM(s) Oral daily        ENDOCRINE  CAPILLARY BLOOD GLUCOSE  193 (01 Jul 2022 17:00)      POCT Blood Glucose.: 178 mg/dL (01 Jul 2022 22:42)  POCT Blood Glucose.: 195 mg/dL (01 Jul 2022 17:30)  POCT Blood Glucose.: 233 mg/dL (01 Jul 2022 11:58)  POCT Blood Glucose.: 153 mg/dL (01 Jul 2022 09:02)  POCT Blood Glucose.: 76 mg/dL (01 Jul 2022 05:37)    Meds: atorvastatin 40 milliGRAM(s) Oral at bedtime  insulin lispro (ADMELOG) corrective regimen sliding scale   SubCutaneous every 8 hours  insulin NPH human recombinant 2 Unit(s) SubCutaneous every 8 hours  levothyroxine Injectable 40 MICROGram(s) IV Push at bedtime  predniSONE  Solution 5 milliGRAM(s) Oral daily        ACCESS DEVICES:  [x] Peripheral IV  [ ] Central Venous Line	[ ] R	[ ] L	[ ] IJ	[ ] Fem	[ ] SC	Placed:   [ ] Arterial Line		[ ] R	[ ] L	[ ] Fem	[ ] Rad	[ ] Ax	Placed:   [ ] PICC:					[ ] Mediport  [ ] Urinary Catheter, Date Placed:   [x] Necessity of urinary, arterial, and venous catheters discussed    OTHER MEDICATIONS:  chlorhexidine 2% Cloths 1 Application(s) Topical <User Schedule>      CODE STATUS: full code       IMAGING:

## 2022-07-02 NOTE — PROGRESS NOTE ADULT - SUBJECTIVE AND OBJECTIVE BOX
[facility]   MD NINA MASON MD KRISTINE SOLTANPOUR, WALDO PALMA    TEL:  OFFICE: [phone]  From 5pm-7am Answering Service [phone]    -- RENAL FOLLOW UP NOTE ---Date of Service 07-02-22 @ 23:49    Patient is a 66y old  Male who presents with a chief complaint of Status Epilepticus (02 Jul 2022 11:19)      Patient seen and examined at bedside. No chest pain/sob    VITALS:  T(F): 96.8 (07-02-22 @ 19:00), Max: 97.3 (07-02-22 @ 15:00)  HR: 56 (07-02-22 @ 21:00)  BP: 128/60 (07-02-22 @ 21:00)  RR: 10 (07-02-22 @ 21:00)  SpO2: 98% (07-02-22 @ 21:00)  Wt(kg): --    07-01 @ 07:01  -  07-02 @ 07:00  --------------------------------------------------------  IN: 1202 mL / OUT: 1120 mL / NET: 82 mL    07-02 @ 07:01  -  07-02 @ 23:49  --------------------------------------------------------  IN: 1480 mL / OUT: 1250 mL / NET: 230 mL          PHYSICAL EXAM:  General: NAD  Neck: No JVD  Respiratory: CTAB, no wheezes, rales or rhonchi  Cardiovascular: S1, S2, RRR  Gastrointestinal: BS+, soft, NT/ND  Extremities: No peripheral edema    Hospital Medications:   MEDICATIONS  (STANDING):  amLODIPine   Tablet 10 milliGRAM(s) Oral <User Schedule>  atorvastatin 40 milliGRAM(s) Oral at bedtime  chlorhexidine 2% Cloths 1 Application(s) Topical <User Schedule>  doxazosin 4 milliGRAM(s) Oral daily  enoxaparin Injectable 40 milliGRAM(s) SubCutaneous every 24 hours  epoetin cortney-epbx (RETACRIT) Injectable 93349 Unit(s) SubCutaneous every 7 days  fluconAZOLE IVPB 200 milliGRAM(s) IV Intermittent every 24 hours  fosphenytoin IVPB 100 milliGRAM(s) PE IV Intermittent every 8 hours  hydrALAZINE 100 milliGRAM(s) Oral every 8 hours  insulin lispro (ADMELOG) corrective regimen sliding scale   SubCutaneous every 8 hours  insulin NPH human recombinant 2 Unit(s) SubCutaneous every 8 hours  levothyroxine Injectable 40 MICROGram(s) IV Push at bedtime  mycophenolate mofetil Suspension 500 milliGRAM(s) Oral every 12 hours  pantoprazole   Suspension 40 milliGRAM(s) Oral daily  perampanel 4 milliGRAM(s) Oral at bedtime  polyethylene glycol 3350 17 Gram(s) Oral two times a day  predniSONE  Solution 5 milliGRAM(s) Oral daily  senna Syrup 10 milliLiter(s) Oral daily  valGANciclovir 50 mG/mL Oral Solution 450 milliGRAM(s) Oral daily      LABS:  07-02    141  |  101  |  37<H>  ----------------------------<  166<H>  5.1   |  29  |  1.48<H>    Ca    8.7      02 Jul 2022 03:23  Phos  3.0     07-01  Mg     2.3     07-01    TPro  6.1  /  Alb  2.8<L>  /  TBili  0.2  /  DBili      /  AST  17  /  ALT  12  /  AlkPhos  129<H>  07-02    Creatinine Trend: 1.48 <--, 1.42 <--, 1.50 <--, 1.47 <--, 1.52 <--, 1.44 <--, 1.34 <--, 1.32 <--, 1.31 <--, 1.33 <--, 1.51 <--, 1.64 <--, 1.74 <--, 1.89 <--    Albumin, Serum: 2.8 g/dL (07-02 @ 03:23)                              8.9    5.84  )-----------( 351      ( 01 Jul 2022 17:43 )             27.9     Urine Studies:  Urinalysis - [06-26-22 @ 06:25]      Color Light Yellow / Appearance Clear / SG 1.015 / pH 7.0      Gluc Negative / Ketone Negative  / Bili Negative / Urobili Negative       Blood Negative / Protein Trace / Leuk Est Negative / Nitrite Negative      RBC 3 / WBC 1 / Hyaline 0 / Gran  / Sq Epi  / Non Sq Epi 1 / Bacteria Negative      Iron 17, TIBC 171, %sat 10      [05-02-22 @ 13:03]  Ferritin 1505      [05-02-22 @ 13:05]  PTH -- (Ca 9.2)      [02-05-22 @ 10:13]   294  HbA1c 6.0      [02-21-20 @ 08:53]      Syphilis Screen (Treponema Pallidum Ab) Negative      [06-27-22 @ 02:00]    RADIOLOGY & ADDITIONAL STUDIES:

## 2022-07-02 NOTE — PROGRESS NOTE ADULT - NS ATTEND AMEND GEN_ALL_CORE FT
Neuro following, still with poor mental status  will need PEG for dispo  graft function appropriate.  Belatacept #1 6/23.  #2 6/30,  MMF 500mg BID, Pred 5

## 2022-07-03 NOTE — PROGRESS NOTE ADULT - SUBJECTIVE AND OBJECTIVE BOX
[facility]   MD NINA MASON MD KRISTINE SOLTANPOUR, WALDO PALMA    TEL:  OFFICE: [phone]  From 5pm-7am Answering Service [phone]    -- RENAL FOLLOW UP NOTE ---Date of Service 07-03-22 @ 13:09    Patient is a 66y old  Male who presents with a chief complaint of Status Epilepticus (03 Jul 2022 05:30)      Patient seen and examined at bedside. No chest pain/sob    VITALS:  T(F): 97.2 (07-03-22 @ 03:00), Max: 97.3 (07-02-22 @ 15:00)  HR: 51 (07-03-22 @ 09:00)  BP: 152/70 (07-03-22 @ 09:00)  RR: 9 (07-03-22 @ 09:00)  SpO2: 98% (07-03-22 @ 09:00)  Wt(kg): --    07-02 @ 07:01  -  07-03 @ 07:00  --------------------------------------------------------  IN: 2310 mL / OUT: 2200 mL / NET: 110 mL    07-03 @ 07:01  -  07-03 @ 13:09  --------------------------------------------------------  IN: 100 mL / OUT: 0 mL / NET: 100 mL          PHYSICAL EXAM:  General: NAD  Neck: No JVD  Respiratory: CTAB, no wheezes, rales or rhonchi  Cardiovascular: S1, S2, RRR  Gastrointestinal: BS+, soft, NT/ND  Extremities: No peripheral edema    Hospital Medications:   MEDICATIONS  (STANDING):  amLODIPine   Tablet 10 milliGRAM(s) Oral <User Schedule>  atorvastatin 40 milliGRAM(s) Oral at bedtime  chlorhexidine 2% Cloths 1 Application(s) Topical <User Schedule>  doxazosin 4 milliGRAM(s) Oral daily  enoxaparin Injectable 40 milliGRAM(s) SubCutaneous every 24 hours  epoetin cortney-epbx (RETACRIT) Injectable 09148 Unit(s) SubCutaneous every 7 days  fluconAZOLE IVPB 200 milliGRAM(s) IV Intermittent every 24 hours  fosphenytoin IVPB 100 milliGRAM(s) PE IV Intermittent every 8 hours  hydrALAZINE 100 milliGRAM(s) Oral every 8 hours  insulin lispro (ADMELOG) corrective regimen sliding scale   SubCutaneous every 8 hours  insulin NPH human recombinant 2 Unit(s) SubCutaneous every 8 hours  levothyroxine Injectable 40 MICROGram(s) IV Push at bedtime  mycophenolate mofetil Suspension 500 milliGRAM(s) Oral every 12 hours  pantoprazole   Suspension 40 milliGRAM(s) Oral daily  perampanel 4 milliGRAM(s) Oral at bedtime  polyethylene glycol 3350 17 Gram(s) Oral two times a day  predniSONE  Solution 5 milliGRAM(s) Oral daily  senna Syrup 10 milliLiter(s) Oral daily  valGANciclovir 50 mG/mL Oral Solution 450 milliGRAM(s) Oral daily      LABS:  07-03    139  |  101  |  35<H>  ----------------------------<  178<H>  5.1   |  26  |  1.45<H>    Ca    8.6      03 Jul 2022 06:04  Phos  3.0     07-03  Mg     2.2     07-03    TPro  5.9<L>  /  Alb  2.4<L>  /  TBili  0.2  /  DBili      /  AST  18  /  ALT  11  /  AlkPhos  126<H>  07-03    Creatinine Trend: 1.45 <--, 1.45 <--, 1.48 <--, 1.42 <--, 1.50 <--, 1.47 <--, 1.52 <--, 1.44 <--, 1.34 <--, 1.32 <--, 1.31 <--, 1.33 <--, 1.51 <--, 1.64 <--, 1.74 <--    Albumin, Serum: 2.4 g/dL (07-03 @ 06:04)  Phosphorus Level, Serum: 3.0 mg/dL (07-03 @ 06:04)  Phosphorus Level, Serum: 3.0 mg/dL (07-03 @ 00:23)  Albumin, Serum: 2.6 g/dL (07-03 @ 00:23)                              8.8    5.25  )-----------( 356      ( 03 Jul 2022 06:05 )             28.2     Urine Studies:  Urinalysis - [06-26-22 @ 06:25]      Color Light Yellow / Appearance Clear / SG 1.015 / pH 7.0      Gluc Negative / Ketone Negative  / Bili Negative / Urobili Negative       Blood Negative / Protein Trace / Leuk Est Negative / Nitrite Negative      RBC 3 / WBC 1 / Hyaline 0 / Gran  / Sq Epi  / Non Sq Epi 1 / Bacteria Negative      Iron 17, TIBC 171, %sat 10      [05-02-22 @ 13:03]  Ferritin 1505      [05-02-22 @ 13:05]  PTH -- (Ca 9.2)      [02-05-22 @ 10:13]   294  HbA1c 6.0      [02-21-20 @ 08:53]      Syphilis Screen (Treponema Pallidum Ab) Negative      [06-27-22 @ 02:00]    RADIOLOGY & ADDITIONAL STUDIES:  ACC: [id number] EXAM:  XR CHEST PORTABLE ROUTINE 1V                        ACC: [id number] EXAM:  XR CHEST PORTABLE URGENT 1V                        ACC: [id number] EXAM:  XR CHEST PORTABLE ROUTINE 1V                          PROCEDURE DATE:  07/01/2022          INTERPRETATION:  EXAMINATION: XR CHEST, XR CHEST URGENT, XR CHEST    CLINICAL INDICATION: eval atelectasis    TECHNIQUE: Frontal views of the chest from 7/1/2022 at 0659 and from   7/2/2022 at 0705 and 0841. COMPARISON: Chest radiograph from 6/30/2022.    IMPRESSION:  Feeding tube extends below diaphragm into left hemiabdomen.  New round uniform opacity opacity in right mid lung may represent focally   loculated fissural pleural fluid.  Blunted right CP angle compatible with   small pleural reaction. Right apical pleural thickening. Sharp left CP   angle.  Sternotomy wires again noted. Stable sightly prominent appearing cardiac   and mediastinal silhouettes.    --- End of Report ---          ALEXANDRO TSE MD; Resident Radiologist  This document has been electronically signed.  ANN RODRIGEZ MD; Attending Radiologist  This document has been electronically signed. Jul 2 2022  9:27AM

## 2022-07-03 NOTE — CHART NOTE - NSCHARTNOTEFT_GEN_A_CORE
Diabetes Follow up note: non-billable visit    Chief complaint: T2DM    Interval Hx: Reviewed glucose values 130-190s this weekend. Remains on continuous tube feeds at goal rate.       MEDS:  atorvastatin 40 milliGRAM(s) Oral at bedtime  insulin lispro (ADMELOG) corrective regimen sliding scale   SubCutaneous every 8 hours  insulin NPH human recombinant 2 Unit(s) SubCutaneous every 8 hours  levothyroxine Injectable 40 MICROGram(s) IV Push at bedtime  predniSONE  Solution 5 milliGRAM(s) Oral daily    fluconAZOLE IVPB 200 milliGRAM(s) IV Intermittent every 24 hours  valGANciclovir 50 mG/mL Oral Solution 450 milliGRAM(s) Oral daily    Allergies    No Known Allergies      Vital Signs Last 24 Hrs  T(C): 36.2 (03 Jul 2022 03:00), Max: 36.3 (02 Jul 2022 15:00)  T(F): 97.2 (03 Jul 2022 03:00), Max: 97.3 (02 Jul 2022 15:00)  HR: 51 (03 Jul 2022 09:00) (50 - 58)  BP: 152/70 (03 Jul 2022 09:00) (128/60 - 165/72)  BP(mean): 100 (03 Jul 2022 09:00) (86 - 117)  RR: 9 (03 Jul 2022 09:00) (9 - 21)  SpO2: 98% (03 Jul 2022 09:00) (93% - 100%)      LABS:  POCT Blood Glucose.: 197 mg/dL (07-03-22 @ 14:06)  POCT Blood Glucose.: 170 mg/dL (07-03-22 @ 06:04)  POCT Blood Glucose.: 135 mg/dL (07-02-22 @ 22:12)  POCT Blood Glucose.: 167 mg/dL (07-02-22 @ 14:44)  POCT Blood Glucose.: 194 mg/dL (07-02-22 @ 05:53)  POCT Blood Glucose.: 178 mg/dL (07-01-22 @ 22:42)  POCT Blood Glucose.: 195 mg/dL (07-01-22 @ 17:30)  POCT Blood Glucose.: 233 mg/dL (07-01-22 @ 11:58)  POCT Blood Glucose.: 153 mg/dL (07-01-22 @ 09:02)  POCT Blood Glucose.: 76 mg/dL (07-01-22 @ 05:37)  POCT Blood Glucose.: 150 mg/dL (06-30-22 @ 23:10)  POCT Blood Glucose.: 120 mg/dL (06-30-22 @ 18:10)                            8.8    5.25  )-----------( 356      ( 03 Jul 2022 06:05 )             28.2       07-03    139  |  101  |  35<H>  ----------------------------<  178<H>  5.1   |  26  |  1.45<H>    eGFR: 53<L>    Ca    8.6      07-03  Mg     2.2     07-03  Phos  3.0     07-03    TPro  5.9<L>  /  Alb  2.4<L>  /  TBili  0.2  /  DBili  x   /  AST  18  /  ALT  11  /  AlkPhos  126<H>  07-03        A1C with Estimated Average Glucose Result: 6.0 % (06-30-22 @ 05:49)  A1C with Estimated Average Glucose Result: 6.6 % (04-24-22 @ 17:12)  A1C with Estimated Average Glucose Result: 7.3 % (02-04-22 @ 13:42)  A1C with Estimated Average Glucose Result: 7.2 % (02-04-22 @ 05:48)      T2DM:  -test BG Q8h  -c/w  NPH 2 units q8h 6am 2pm and 10pm. HOLD IF TFS ARE OFF  -Cw Admelog low dose correction scale q8h 6am 2pm and 10pm  -Will adjust as needed  -Please inform endo team of any changes on steroid therapy or PO/TF status  Discharge  - Likely discharge on basal bolus insulin, Plan TBD based on insulin requirements at [place].   Outpatient f/u with Endocrinologist Dr. Lincoln. [address] [facility] [place] [address]. Phone  Needs apt at time of discharge  -Needs optho/renal/cardiac f/u as out pt  -Make sure pt has insulin sand DM supplies at time of discharge.      Can be reached via TEAMS/pager: [phone]   office:  [phone] (M-F 9a-5pm)               [phone] (nights/weekends)   [email or web address] password [email or web address]

## 2022-07-03 NOTE — PROGRESS NOTE ADULT - ASSESSMENT
67yo man with PMH of CVA (2019) with subsequent seizure disorder, ESRD s/p renal transplant (04/2022), afib (on apixaban), CAD (s/p stents), CABG (2020), HTN, HLD, DM presents to the ED with status epilepticus from [facility]. Semiology includes eyes deviated to the R, rhythmic R facial twitching as well as R shoulder clonic movements lasting approximately 30 seconds each. EEG from 04/2022 showed L frontocentral focal onset seizures. S/p intubation on 6/11. EEG negative for seizure but showed structural or functional abnormality in the left fronto-temporal region and moderate to severe nonspecific diffuse or multifocal cerebral dysfunction. RRT called 6/21 for decreased movement of RUE but with increased tone and tremor like activity/ clonus and diffuse increased tone and increased encephalopathy concerning for seizure. Course then complicated by EEG 6/24 showing L frontotemporal/ frontal seizures. Was on keppra, valproic acid, vimpat but had seizures through the prior two medications. Now on fycompa, fosphenytoin. Stopped vimpat, valproic acid and standing ativan.    Corrected phenytoin level: measured level 16.7, corrected 22.0 (7/3)  EEG on 6/24 showing left frontal and frontotemporal onset seizures    Impression: History of recent status epilepticus that was refractory and required current therapy of fycompa and fosphenytoin. Current change in mental status possibly from hospital-induced delirium with toxic-metabolic encephalopathy. Low current clinical suspicion fycompa is contributing based on dose and other medical comorbidities.      Recommendations:  [ ] Phenytoin corrected level mildly supratherapeutic and may be contributing to encephalopathy. Would change dosage to phenytoin maintenance with 100mg/100mg/75mg daily. Please check new phenytoin trough (30-60 minutes before dose) and LFT's on 7/4 AM. May adjust then depending on level  [ ] Continue Fycompa at 4mg PO bedtime (make sure the order does not fall off)  [ ] For stroke standpoint, should restart eliquis for secondary stroke prevention given afib hx if not otherwise contraindicated  [x] CBC, CMP, VBG w/ lactate, CK    [ ] f/u LP results    [x] BP goals of normotension   [x] MR brain without clear evidence of infection, inflammation, or acute CNS event (possibly minimal enhancement). Although prior study read as possible PRES, most recent MRI seems more consistent with significant chronic ischemic microvascular disease given no associated DWI changes or obvious enhancement  [x] Telemetry monitoring  [x] Neuro checks and vital signs per unit protocol  [x] Seizure/fall/aspiration precautions  [/] Advise against driving, operating heavy machinery, water sports/bathing, activity in elevation without appropriate safety precautions  [ ] outpatient EMG/ NCS       To be discussed with neurology attending and will be formally staffed by attending during morning rounds. Recommendations will be finalized once signed by attending.

## 2022-07-03 NOTE — PROGRESS NOTE ADULT - ASSESSMENT
65 y/o male w/ a PMHx of CAD s/p CABG, AF on Eliquis c/b CVA c/b seizures, HTN, HLD, DM type II, hypothyroidism, GI bleed due to a duodenal ulcer, and ESRD s/p DDRT c/b DGF requiring HD & large perinephric hematoma s/p evacuation w/ revision of arterial anastomosis who presented in status epilepticus requiring intubation for airway protection with a hospital course c/b large right pleural effusion s/p thoracentesis c/b hemothorax while being anticoagulated & requiring intubation for airway protection s/p chest tube placement w/ CT demonstrating retained hemothorax s/p right VATS, status epilepticus again causing AMS requiring intubation for airway protection again, delirium, dysphagia requiring NGT placement for enteral access, hyperkalemia, and poor glycemic control    PLAN:    Neuro: 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 c/b status epilepticus, delirium  - Assess neurological status every 4 hours in the setting of seizures  - Fosphenytoin w/ daily levels & perampanel for seizures  - Appreciate neurology recommendations  - Pain control if needed w/ acetaminophen    Resp: intubated for airway protection x3 (resolved), large right pleural effusion s/p thoracentesis c/b hemothorax while being anticoagulated s/p chest tube placement c/b retained hemothorax s/p right VATS  - Out of bed to chair, incentive spirometry, and aggressive chest CT to prevent atelectasis    CV: CAD, AF, HTN  - Amlodipine and hydralazine for HTN    GI: dysphagia  - NPO w/ Glucerna 1.5 at 50 mL/hr via NGT  - Patient's family appear to be agreeable to PEG  - Bowel regimen with senna & Miralax  - Protonix for stress ulcer prophylaxis    Renal: s/p DDRT, urinary retention, hyperkalemia (resolved)  - Immunosuppression as per transplant surgery (currently on MMF & steroids)  - Doxazosin for urinary retention    Heme: anemia of chronic diseases  - Monitor CBC and coags  - Lovenox for VTE prophylaxis  - Eliquis being held for possible PEG  - Epogen for anemia of chronic diseases  - Free water 200 mL q12hrs as per nephrology    ID: UTI, immunosuppression  - Monitor WBC, temperature, and procalcitonin  - Empiric antibiotics w/ ertapenem for UTI as per ID  - Fluconazole and Valcyte for immunosuppression prophylaxis  - Holding Bactrim for hyperkalemia    Endo: DM type II, HLD, hypothyroidism  - Monitor glucose w/ NPH 2 units q8hrs & ISS q8hrs for glycemic control; HgbA1C 6.6% on 4/24  - Atorvastatin for HLD  - Synthroid for hypothyroidism    Code Status: Full code    Rebecca Yepez PA-C     [id number]

## 2022-07-03 NOTE — PROGRESS NOTE ADULT - ASSESSMENT
66 yr old Male with PMHx ESRD s/p permacath removal 5/10/22 s/p Rt DDRT 4/21/22,  CVA (2019), Afib on apixaban, seizure activity, CAD s/p stents, CABG (2020), HTN, HLD, DM on insulin, gastric/duodenal ulcer, recent hospitalization 4/28/22 -5/10/22 with weakness/anemia found to have perinephric hematoma requiring evacuation and repair of bleeding arterial anastomosis. Curently being treated for UTI with Levaquin Today after developing multiple sz episodes refractory to 5 mg versed IM (EMS) and 2 mg ativan IV in E.D. concerning for status epilepticus requiring intubation for hypoxic respiratory  failure. MICU Consult was called for hypoxic respiratory failure secondary to status epilepticus.  Nephrology consulted for renal failure.     A/P  DDRT on 04/21/22  Course complicated by DGF, was on HD until 4/29/22. Readmitted in May for anemia in the setting of large perinephric hematoma s/p evacuation and repair of arterial anastomosis on 5/2/22. Intra operative biopsy showed no rejection.  ANA likely sec to  hemodynamic change   scr fluctuating   optimize glucose   monitor BMP, U/O     Hyperkalemia   patient is on bactrim   Now off lokelma  Please start 10 mg po daily.    monitor K closely     HTN   Suboptimal  Suggest to switch amlodipine to nifedipine XL 60 mg po daily.   monitor BP closely     Immunosuppression  continue per transplant team

## 2022-07-03 NOTE — PROGRESS NOTE ADULT - SUBJECTIVE AND OBJECTIVE BOX
Transplant Surgery Multidisciplinary Progress Note   --------------------------------------------------------------  R DCD DDRT    22    Present: Patient seen and examined with multidisciplinary team including Transplant Surgeon: Dr. David,  Nephrologist: Dr. Alfred, NP Altagracia Montelongo and SICU team during am rounds.  Disciplines not in attendance will be notified of the plan.     HPI: 66M with PMH: DM type II, HTN, CAD s/p CABG in , AFib on Eliquis, CVA in  due to Afib, Seizure d/o following CVA, last episode was on 22, h/o GIB in 2022 EGD with duodenal ulcer.  ESRD due to DM was on HD since .     s/p R DCD DDRT on 22.  Donor was 58 , KDPI 82%, DCD, single vessels and ureter, HLA mismatch 1, 2, 2. No DSA, cPRA 0%. CMV +/+  Course complicated by DGF, was on HD until 22. Re-admitted in May for anemia in the setting of large perinephric hematoma s/p evacuation and repair of arterial anastomosis on 22. Intra operative biopsy showed no rejection, Creatinine ranging ~2mg/dL.    In Rehab: He was recently dx with klebsiella UTI rx with ertapenem - then switched to Levaquin day #6.    He also had parainfluenza pneumonia. Was at rehab progressing well.      Hospital course:  - 6/10: transferred from rehab facility for seizure status epilepticus. Intubated for respiratory protection and transferred to MICU.  EEG showed no seizure activity. Neuro consulted.   - : Extubated. Found to have R pleural effusion. s/p Thoracentesis 1.3L. Eliquis changed to heparin drip.  Post procedure drop in H&H. 5u PRBC, 2 u FFP.    -  evening: Transferred to SICU for further care in setting of hypoxia, significant R pleural effusion, hgb 6 and hemodynamic instability  -  Intubated at 9pm and sedated on Precedex.  CT placed, 600ml blood drained.  1L total overnight, started pressors  -  Received Protamine for PTT >100 (was on Heparin drip started for AF), 2 u FFP and 5u PRBC total  -  s/p VATS 2.2L old blood removed; second chest tube placed  - -: chest tubes removed  - : ?PRES on MRI, off Envarsus, switched to Belatacept   - : Tx to SICU for refractory seizures    Interval Events:  -Afebrile, VSS  -sleeping, reports from staff of agitated behavior   - LP from  neg for infection   - episode hypoglycemia   -remains seizure free since  on current regimen      Immunosuppression:   Induction:  Thymoglobulin                                        Maintenance:  -Belatacept #1 , #2 dose   -MMF restarted at 500BID ,  continue Pred 5  -Ongoing monitoring for signs of rejection.    Potential Discharge date: pending clinical improvement  Education:  Medications  Plan of care:  See Below      MEDICATIONS  (STANDING):  amLODIPine   Tablet 10 milliGRAM(s) Oral <User Schedule>  atorvastatin 40 milliGRAM(s) Oral at bedtime  chlorhexidine 2% Cloths 1 Application(s) Topical <User Schedule>  doxazosin 4 milliGRAM(s) Oral daily  enoxaparin Injectable 40 milliGRAM(s) SubCutaneous every 24 hours  epoetin cortney-epbx (RETACRIT) Injectable 27146 Unit(s) SubCutaneous every 7 days  fluconAZOLE IVPB 200 milliGRAM(s) IV Intermittent every 24 hours  fosphenytoin IVPB 100 milliGRAM(s) PE IV Intermittent every 8 hours  hydrALAZINE 100 milliGRAM(s) Oral every 8 hours  insulin lispro (ADMELOG) corrective regimen sliding scale   SubCutaneous every 8 hours  insulin NPH human recombinant 2 Unit(s) SubCutaneous every 8 hours  levothyroxine Injectable 40 MICROGram(s) IV Push at bedtime  mycophenolate mofetil Suspension 500 milliGRAM(s) Oral every 12 hours  pantoprazole   Suspension 40 milliGRAM(s) Oral daily  perampanel 4 milliGRAM(s) Oral at bedtime  polyethylene glycol 3350 17 Gram(s) Oral two times a day  predniSONE  Solution 5 milliGRAM(s) Oral daily  senna Syrup 10 milliLiter(s) Oral daily  valGANciclovir 50 mG/mL Oral Solution 450 milliGRAM(s) Oral daily    MEDICATIONS  (PRN):  acetaminophen    Suspension .. 650 milliGRAM(s) Enteral Tube every 6 hours PRN Mild Pain (1 - 3)  hydrALAZINE Injectable 10 milliGRAM(s) IV Push three times a day PRN SBP >160      PAST MEDICAL & SURGICAL HISTORY:  Hypertension      Diabetes      Dyslipidemia      CAD (Coronary Artery Disease)  with Stents in 2009 and 2019, s/p off-pump C3L on 20      Hypothyroidism      CVA (cerebral vascular accident)  19 with residual bilateral weakness      Anemia      PEG (percutaneous endoscopic gastrostomy) status  removed 2020      Intubation of airway performed without difficulty  Dec 2019  CVA c/b status epilepticus requiring intubation and PEG in 2019-      ESRD on dialysis  M/W/F      Seizures  after CVA, last seizure was last week 22      History of insertion of stent into coronary artery bypass graft   and       S/P CABG x 3  off pump C3L on 20          Vital Signs Last 24 Hrs  T(C): 36.2 (2022 03:00), Max: 36.2 (2022 03:00)  T(F): 97.2 (2022 03:00), Max: 97.2 (2022 03:00)  HR: 51 (2022 09:00) (50 - 58)  BP: 152/70 (2022 09:00) (128/60 - 165/72)  BP(mean): 100 (2022 09:00) (86 - 117)  RR: 9 (2022 09:00) (9 - 21)  SpO2: 98% (2022 09:00) (94% - 100%)    I&O's Summary    2022 07:01  -  2022 07:00  --------------------------------------------------------  IN: 2310 mL / OUT: 2200 mL / NET: 110 mL    2022 07:01  -  2022 15:08  --------------------------------------------------------  IN: 100 mL / OUT: 0 mL / NET: 100 mL          LABS:                        8.8    5.25  )-----------( 356      ( 2022 06:05 )             28.2     07-03    139  |  101  |  35<H>  ----------------------------<  178<H>  5.1   |  26  |  1.45<H>    Ca    8.6      2022 06:04  Phos  3.0     07-  Mg     2.2     07-03    TPro  5.9<L>  /  Alb  2.4<L>  /  TBili  0.2  /  DBili  x   /  AST  18  /  ALT  11  /  AlkPhos  126<H>  07-03    PT/INR - ( 2022 06:05 )   PT: 15.3 sec;   INR: 1.33 ratio         PTT - ( 2022 06:05 )  PTT:38.0 sec  Urinalysis Basic - ( 2022 14:32 )    Color: Light Yellow / Appearance: Clear / S.018 / pH: x  Gluc: x / Ketone: Negative  / Bili: Negative / Urobili: Negative   Blood: x / Protein: Trace / Nitrite: Negative   Leuk Esterase: Negative / RBC: 1 /hpf / WBC 2 /HPF   Sq Epi: x / Non Sq Epi: 1 /hpf / Bacteria: Negative      CAPILLARY BLOOD GLUCOSE      POCT Blood Glucose.: 197 mg/dL (2022 14:06)  POCT Blood Glucose.: 170 mg/dL (2022 06:04)  POCT Blood Glucose.: 135 mg/dL (2022 22:12)    LIVER FUNCTIONS - ( 2022 06:04 )  Alb: 2.4 g/dL / Pro: 5.9 g/dL / ALK PHOS: 126 U/L / ALT: 11 U/L / AST: 18 U/L / GGT: x             Culture - Fungal, CSF (collected 22 @ 13:09)  Source: .CSF CSF  Preliminary Report (22 @ 09:52):    Testing in progress    Culture - CSF with Gram Stain (collected 22 @ 13:09)  Source: .CSF CSF  Gram Stain (22 @ 18:00):    No polymorphonuclear leukocytes seen    No organisms seen    by cytocentrifuge  Final Report (22 @ 16:04):    No growth    Culture - Acid Fast - CSF (collected 22 @ 13:09)  Source: .CSF CSF  Preliminary Report (22 @ 15:04):    Culture is being performed.    Culture - Nocardia (collected 22 @ 05:06)  Source: .Other Other  Preliminary Report (22 @ 11:38):    No Nocardia isolated    Culture - Blood (collected 22 @ 23:27)  Source: .Blood Blood-Peripheral  Final Report (22 @ 04:00):    No Growth Final    Culture - Blood (collected 22 @ 23:27)  Source: .Blood Blood-Peripheral  Final Report (22 @ 04:00):    No Growth Final    Culture - Bronchial (collected 22 @ 18:08)  Source: Combi-Cath Combi-Cath  Gram Stain (22 @ 11:17):    Few polymorphonuclear leukocytes per low power field    Rare Squamous Epithelial Cells per low power field    Rare Gram Positive Rods per oil power field  Final Report (22 @ 08:38):    Normal Respiratory Rachel present          Cultures:    Culture - Fungal, CSF (collected 22 @ 13:09)  Source: .CSF CSF  Preliminary Report (22 09:52):    Testing in progress    Culture - CSF with Gram Stain (collected 22 @ 13:09)  Source: .CSF CSF  Gram Stain (22 @ 18:00):    No polymorphonuclear leukocytes seen    No organisms seen    by cytocentrifuge  Final Report (22 @ 16:04):    No growth    Culture - Acid Fast - CSF (collected 22 @ 13:09)  Source: .CSF CSF  Preliminary Report (22 @ 15:04):    Culture is being performed.    Culture - Nocardia (collected 22 @ 05:06)  Source: .Other Other  Preliminary Report (22 @ 11:38):    No Nocardia isolated    Culture - Blood (collected 22 @ 23:27)  Source: .Blood Blood-Peripheral  Final Report (22 @ 04:00):    No Growth Final    Culture - Blood (collected 22 @ 23:27)  Source: .Blood Blood-Peripheral  Final Report (22 @ 04:00):    No Growth Final    Culture - Bronchial (collected 22 @ 18:08)  Source: Combi-Cath Combi-Cath  Gram Stain (22 @ 11:17):    Few polymorphonuclear leukocytes per low power field    Rare Squamous Epithelial Cells per low power field    Rare Gram Positive Rods per oil power field  Final Report (22 @ 08:38):    Normal Respiratory Rachel present          REVIEW OF SYSTEMS  --------------------------------------------------------------------------------  unable to assess, +delirium        PHYSICAL EXAM:  Constitutional: Well developed / well nourished  Eyes: Anicteric, PERRLA  ENMT: nc/at  Neck: supple  Respiratory: CTA   Cardiovascular: RRR  Gastrointestinal: Soft, non distended, NT, incision healed   Genitourinary: tucker   Extremities: SCD's in place and working bilaterally, LUE pitting edema  Vascular: Palpable dp pulses bilaterally  Neurological: more awake today, AAOx1, intermittently following commands, no tremors noted  Skin: no rashes, ulcerations or lesions  Musculoskeletal: Moving all extremities, global weakness  Psychiatric: calm, periods of mild agitation

## 2022-07-03 NOTE — PROGRESS NOTE ADULT - SUBJECTIVE AND OBJECTIVE BOX
HISTORY:  67 y/o male w/ a PMHx of CAD s/p CABG (2020), AF on Eliquis c/b CVA (2019) c/b seizures, HTN, HLD, DM type II, hypothyroidism, GI bleed due to a duodenal ulcer (2/2022), and ESRD s/p DDRT (4/21/22) c/b DGF requiring HD (last session 4/29/22) & large perinephric hematoma s/p evacuation w/ revision of arterial anastomosis (5/2/22) who presented on 6/10/22 for status epilepticus. Patient was intubated for airway protection. AEDs were adjusted and he was successfully extubated on 6/11. Patient was noted to have a large right pleural effusion so thoracentesis was performed w/ 1.3 L of serosanguineous fluid drained. Patient was started on a heparin infusion for his atrial fibrillation post-procedure but had a H&H drop w/ CXR demonstrating complete right lung whiteout concerning for a hemothorax. Patient was transferred to SICU for further management. He was intubated for airway protection again, transfused several products, and reversed w/ protamine. A right 28 Fr chest tube was placed w/ a significant amount of sanguineous fluid drained. He was noted to have a significant amount of residual hemothorax so he eventually was taken to the OR on 6/15 for a right VATS for hematoma washout and placement of a second chest tube. He was transferred to the floors on 6/17. Both chest tubes were removed on 6/18. He went into status epilepticus again on 6/25 so he was readmitted to SICU. He was intubated for airway protection again on 6/26 and successfully extubated on 6/28. Hospital course has also been significant for delirium, dysphagia requiring NGT placement for enteral access, hyperkalemia, and poor glycemic control. Patient currently denies headache, fevers, chills, dizziness, weakness, shortness of breath, chest pain, abdominal pain, or nausea/vomiting.    24 HOUR EVENTS:    NEURO  RASS (if intubated): 		CAM ICU (if concern for delirium):  Exam:   Meds: acetaminophen    Suspension .. 650 milliGRAM(s) Enteral Tube every 6 hours PRN Mild Pain (1 - 3)  fosphenytoin IVPB 100 milliGRAM(s) PE IV Intermittent every 8 hours  perampanel 4 milliGRAM(s) Oral at bedtime      RESPIRATORY  RR: 21 (07-03-22 @ 05:00) (10 - 24)  SpO2: 100% (07-03-22 @ 05:00) (93% - 100%)  Wt(kg): --  Exam:  Mechanical Ventilation:     Meds:     CARDIOVASCULAR  HR: 53 (07-03-22 @ 05:00) (50 - 60)  BP: 148/65 (07-03-22 @ 04:00) (128/60 - 154/67)  BP(mean): 92 (07-03-22 @ 04:00) (86 - 117)  ABP: 159/52 (07-02-22 @ 10:00) (159/52 - 169/54)  ABP(mean): 89 (07-02-22 @ 10:00) (89 - 98)  Wt(kg): --  CVP(cm H2O): --      Exam:   Cardiac Rhythm:   Perfusion     [ ]Adequate   [ ]Inadequate  Mentation   [ ]Normal       [ ]Reduced  Extremities  [ ]Warm         [ ]Cool  Volume Status [ ]Hypervolemic [ ]Euvolemic [ ]Hypovolemic  Meds: amLODIPine   Tablet 10 milliGRAM(s) Oral <User Schedule>  doxazosin 4 milliGRAM(s) Oral daily  hydrALAZINE 100 milliGRAM(s) Oral every 8 hours  hydrALAZINE Injectable 10 milliGRAM(s) IV Push three times a day PRN SBP >160      GI/NUTRITION  Exam:   Diet:   Meds: pantoprazole   Suspension 40 milliGRAM(s) Oral daily  polyethylene glycol 3350 17 Gram(s) Oral two times a day  senna Syrup 10 milliLiter(s) Oral daily      GENITOURINARY  I&O's Detail    07-01 @ 07:01  -  07-02 @ 07:00  --------------------------------------------------------  IN:    Enteral Tube Flush: 200 mL    Free Water: 200 mL    Glucerna 1.5: 600 mL    IV PiggyBack: 102 mL    IV PiggyBack: 100 mL  Total IN: 1202 mL    OUT:    Indwelling Catheter - Urethral (mL): 1120 mL  Total OUT: 1120 mL    Total NET: 82 mL      07-02 @ 07:01  -  07-03 @ 05:31  --------------------------------------------------------  IN:    Enteral Tube Flush: 360 mL    Free Water: 400 mL    Glucerna 1.5: 1050 mL    IV PiggyBack: 50 mL    IV PiggyBack: 150 mL  Total IN: 2010 mL    OUT:    Intermittent Catheterization - Urethral (mL): 1700 mL  Total OUT: 1700 mL    Total NET: 310 mL          07-03    139  |  100  |  34<H>  ----------------------------<  166<H>  5.2   |  27  |  1.45<H>    Ca    8.6      03 Jul 2022 00:23  Phos  3.0     07-03  Mg     2.1     07-03    TPro  6.1  /  Alb  2.6<L>  /  TBili  0.2  /  DBili  x   /  AST  22  /  ALT  14  /  AlkPhos  131<H>  07-03    Meds:     HEMATOLOGIC  Meds: enoxaparin Injectable 40 milliGRAM(s) SubCutaneous every 24 hours                          9.1    6.17  )-----------( 382      ( 03 Jul 2022 00:21 )             29.1     PT/INR - ( 03 Jul 2022 00:21 )   PT: 16.1 sec;   INR: 1.40 ratio         PTT - ( 03 Jul 2022 00:21 )  PTT:37.8 sec    INFECTIOUS DISEASES  T(C): 36.2 (07-03-22 @ 03:00), Max: 36.3 (07-02-22 @ 15:00)  Wt(kg): --  WBC Count: 6.17 K/uL (07-03 @ 00:21)    Recent Cultures:  Specimen Source: .CSF CSF, 06-29 @ 13:09; Results   Culture is being performed.; Gram Stain:   No polymorphonuclear leukocytes seen  No organisms seen  by cytocentrifuge; Organism: --  Specimen Source: .Other Other, 06-28 @ 05:06; Results   No Nocardia isolated; Gram Stain: --; Organism: --  Specimen Source: .Blood Blood-Peripheral, 06-27 @ 23:27; Results   No Growth Final; Gram Stain: --; Organism: --  Specimen Source: Combi-Cath Combi-Cath, 06-26 @ 18:08; Results   Normal Respiratory Rachel present; Gram Stain:   Few polymorphonuclear leukocytes per low power field  Rare Squamous Epithelial Cells per low power field  Rare Gram Positive Rods per oil power field; Organism: --  Specimen Source: .Blood Blood-Peripheral, 06-26 @ 09:01; Results   No Growth Final; Gram Stain: --; Organism: --  Specimen Source: .Blood Blood-Peripheral, 06-26 @ 08:14; Results   Growth in anaerobic bottle: Staphylococcus epidermidis  Coag Negative Staphylococcus  Single set isolate, possible contaminant. Contact  Microbiology if susceptibility testing clinically  indicated.  ***Blood Panel PCR results on this specimen are available  approximately 3 hours after the Gram stain result.***  Gram stain, PCR, and/or culture results may not always  correspond due to difference in methodologies.  ************************************************************  This PCR assay was performed by multiplex PCR. This  Assay tests for 66 bacterial and resistance gene targets.  Please refer to the [facility] test directory  at [email or web address] for details.; Gram Stain:   Growth in anaerobic bottle: Gram positive cocci in pairs; Organism: Blood Culture PCR  Specimen Source: Catheterized Catheterized, 06-26 @ 06:25; Results   No growth; Gram Stain: --; Organism: --    Meds: epoetin cortney-epbx (RETACRIT) Injectable 31160 Unit(s) SubCutaneous every 7 days  fluconAZOLE IVPB 200 milliGRAM(s) IV Intermittent every 24 hours  mycophenolate mofetil Suspension 500 milliGRAM(s) Oral every 12 hours  valGANciclovir 50 mG/mL Oral Solution 450 milliGRAM(s) Oral daily      ENDOCRINE  Capillary Blood Glucose    Meds: atorvastatin 40 milliGRAM(s) Oral at bedtime  insulin lispro (ADMELOG) corrective regimen sliding scale   SubCutaneous every 8 hours  insulin NPH human recombinant 2 Unit(s) SubCutaneous every 8 hours  levothyroxine Injectable 40 MICROGram(s) IV Push at bedtime  predniSONE  Solution 5 milliGRAM(s) Oral daily      ACCESS DEVICES:  [ ] Peripheral IV  [ ] Central Venous Line		[ ] R	[ ] L	[ ] IJ	[ ] Fem	[ ] SC	Placed:   [ ] Arterial Line			[ ] R	[ ] L	[ ] Fem	[ ] Rad	[ ] Ax	Placed:   [ ] PICC:					[ ] Mediport  [ ] Urinary Catheter, Date Placed:   [ ] Necessity of urinary, arterial, and venous catheters discussed    OTHER MEDICATIONS:  chlorhexidine 2% Cloths 1 Application(s) Topical <User Schedule>      IMAGING: HISTORY:  67 y/o male w/ a PMHx of CAD s/p CABG (2020), AF on Eliquis c/b CVA (2019) c/b seizures, HTN, HLD, DM type II, hypothyroidism, GI bleed due to a duodenal ulcer (2/2022), and ESRD s/p DDRT (4/21/22) c/b DGF requiring HD (last session 4/29/22) & large perinephric hematoma s/p evacuation w/ revision of arterial anastomosis (5/2/22) who presented on 6/10/22 for status epilepticus. Patient was intubated for airway protection. AEDs were adjusted and he was successfully extubated on 6/11. Patient was noted to have a large right pleural effusion so thoracentesis was performed w/ 1.3 L of serosanguineous fluid drained. Patient was started on a heparin infusion for his atrial fibrillation post-procedure but had a H&H drop w/ CXR demonstrating complete right lung whiteout concerning for a hemothorax. Patient was transferred to SICU for further management. He was intubated for airway protection again, transfused several products, and reversed w/ protamine. A right 28 Fr chest tube was placed w/ a significant amount of sanguineous fluid drained. He was noted to have a significant amount of residual hemothorax so he eventually was taken to the OR on 6/15 for a right VATS for hematoma washout and placement of a second chest tube. He was transferred to the floors on 6/17. Both chest tubes were removed on 6/18. He went into status epilepticus again on 6/25 so he was readmitted to SICU. He was intubated for airway protection again on 6/26 and successfully extubated on 6/28. Hospital course has also been significant for delirium, dysphagia requiring NGT placement for enteral access, hyperkalemia, and poor glycemic control. Patient currently denies headache, fevers, chills, dizziness, weakness, shortness of breath, chest pain, abdominal pain, or nausea/vomiting.    24 HOUR EVENTS:  - Increased Miralax from daily to BID and gave milk of magnesia 30 mL x1 dose for constipation  - Required straight catheterization for 600 mL and 650 mL so increased doxazosin from 2 mg -> 4 mg at bedtime  - Eliquis held for possible PEG placement so started Lovenox for VTE prophylaxis  - Decreased free water from 200 mL q8hrs -> q12hrs    NEURO  Exam: awake, alert, oriented x2-3, restless & combative at times, moving all four extremities  Meds:  - acetaminophen    Suspension .. 650 milliGRAM(s) Enteral Tube every 6 hours PRN Mild Pain (1 - 3)  - fosphenytoin IVPB 100 milliGRAM(s) PE IV Intermittent every 8 hours  - perampanel 4 milliGRAM(s) Oral at bedtime    RESPIRATORY  RR: 21 (07-03-22 @ 05:00) (10 - 24)  SpO2: 100% (07-03-22 @ 05:00) (93% - 100%)  Exam: clear to auscultation bilaterally    CARDIOVASCULAR  HR: 53 (07-03-22 @ 05:00) (50 - 60)  BP: 148/65 (07-03-22 @ 04:00) (128/60 - 154/67)  BP(mean): 92 (07-03-22 @ 04:00) (86 - 117)  ABP: 159/52 (07-02-22 @ 10:00) (159/52 - 169/54)  ABP(mean): 89 (07-02-22 @ 10:00) (89 - 98)  Wt(kg): --  CVP(cm H2O): --      Exam:   Cardiac Rhythm:   Perfusion     [ ]Adequate   [ ]Inadequate  Mentation   [ ]Normal       [ ]Reduced  Extremities  [ ]Warm         [ ]Cool  Volume Status [ ]Hypervolemic [ ]Euvolemic [ ]Hypovolemic  Meds: amLODIPine   Tablet 10 milliGRAM(s) Oral <User Schedule>  doxazosin 4 milliGRAM(s) Oral daily  hydrALAZINE 100 milliGRAM(s) Oral every 8 hours  hydrALAZINE Injectable 10 milliGRAM(s) IV Push three times a day PRN SBP >160      GI/NUTRITION  Exam:   Diet:   Meds: pantoprazole   Suspension 40 milliGRAM(s) Oral daily  polyethylene glycol 3350 17 Gram(s) Oral two times a day  senna Syrup 10 milliLiter(s) Oral daily      GENITOURINARY  I&O's Detail    07-01 @ 07:01  -  07-02 @ 07:00  --------------------------------------------------------  IN:    Enteral Tube Flush: 200 mL    Free Water: 200 mL    Glucerna 1.5: 600 mL    IV PiggyBack: 102 mL    IV PiggyBack: 100 mL  Total IN: 1202 mL    OUT:    Indwelling Catheter - Urethral (mL): 1120 mL  Total OUT: 1120 mL    Total NET: 82 mL      07-02 @ 07:01  -  07-03 @ 05:31  --------------------------------------------------------  IN:    Enteral Tube Flush: 360 mL    Free Water: 400 mL    Glucerna 1.5: 1050 mL    IV PiggyBack: 50 mL    IV PiggyBack: 150 mL  Total IN: 2010 mL    OUT:    Intermittent Catheterization - Urethral (mL): 1700 mL  Total OUT: 1700 mL    Total NET: 310 mL          07-03    139  |  100  |  34<H>  ----------------------------<  166<H>  5.2   |  27  |  1.45<H>    Ca    8.6      03 Jul 2022 00:23  Phos  3.0     07-03  Mg     2.1     07-03    TPro  6.1  /  Alb  2.6<L>  /  TBili  0.2  /  DBili  x   /  AST  22  /  ALT  14  /  AlkPhos  131<H>  07-03    Meds:     HEMATOLOGIC  Meds: enoxaparin Injectable 40 milliGRAM(s) SubCutaneous every 24 hours                          9.1    6.17  )-----------( 382      ( 03 Jul 2022 00:21 )             29.1     PT/INR - ( 03 Jul 2022 00:21 )   PT: 16.1 sec;   INR: 1.40 ratio         PTT - ( 03 Jul 2022 00:21 )  PTT:37.8 sec    INFECTIOUS DISEASES  T(C): 36.2 (07-03-22 @ 03:00), Max: 36.3 (07-02-22 @ 15:00)  Wt(kg): --  WBC Count: 6.17 K/uL (07-03 @ 00:21)    Recent Cultures:  Specimen Source: .CSF CSF, 06-29 @ 13:09; Results   Culture is being performed.; Gram Stain:   No polymorphonuclear leukocytes seen  No organisms seen  by cytocentrifuge; Organism: --  Specimen Source: .Other Other, 06-28 @ 05:06; Results   No Nocardia isolated; Gram Stain: --; Organism: --  Specimen Source: .Blood Blood-Peripheral, 06-27 @ 23:27; Results   No Growth Final; Gram Stain: --; Organism: --  Specimen Source: Combi-Cath Combi-Cath, 06-26 @ 18:08; Results   Normal Respiratory Rachel present; Gram Stain:   Few polymorphonuclear leukocytes per low power field  Rare Squamous Epithelial Cells per low power field  Rare Gram Positive Rods per oil power field; Organism: --  Specimen Source: .Blood Blood-Peripheral, 06-26 @ 09:01; Results   No Growth Final; Gram Stain: --; Organism: --  Specimen Source: .Blood Blood-Peripheral, 06-26 @ 08:14; Results   Growth in anaerobic bottle: Staphylococcus epidermidis  Coag Negative Staphylococcus  Single set isolate, possible contaminant. Contact  Microbiology if susceptibility testing clinically  indicated.  ***Blood Panel PCR results on this specimen are available  approximately 3 hours after the Gram stain result.***  Gram stain, PCR, and/or culture results may not always  correspond due to difference in methodologies.  ************************************************************  This PCR assay was performed by multiplex PCR. This  Assay tests for 66 bacterial and resistance gene targets.  Please refer to the [facility] test directory  at [email or web address] for details.; Gram Stain:   Growth in anaerobic bottle: Gram positive cocci in pairs; Organism: Blood Culture PCR  Specimen Source: Catheterized Catheterized, 06-26 @ 06:25; Results   No growth; Gram Stain: --; Organism: --    Meds: epoetin cortney-epbx (RETACRIT) Injectable 56500 Unit(s) SubCutaneous every 7 days  fluconAZOLE IVPB 200 milliGRAM(s) IV Intermittent every 24 hours  mycophenolate mofetil Suspension 500 milliGRAM(s) Oral every 12 hours  valGANciclovir 50 mG/mL Oral Solution 450 milliGRAM(s) Oral daily      ENDOCRINE  Capillary Blood Glucose    Meds: atorvastatin 40 milliGRAM(s) Oral at bedtime  insulin lispro (ADMELOG) corrective regimen sliding scale   SubCutaneous every 8 hours  insulin NPH human recombinant 2 Unit(s) SubCutaneous every 8 hours  levothyroxine Injectable 40 MICROGram(s) IV Push at bedtime  predniSONE  Solution 5 milliGRAM(s) Oral daily      ACCESS DEVICES:  [ ] Peripheral IV  [ ] Central Venous Line		[ ] R	[ ] L	[ ] IJ	[ ] Fem	[ ] SC	Placed:   [ ] Arterial Line			[ ] R	[ ] L	[ ] Fem	[ ] Rad	[ ] Ax	Placed:   [ ] PICC:					[ ] Mediport  [ ] Urinary Catheter, Date Placed:   [ ] Necessity of urinary, arterial, and venous catheters discussed    OTHER MEDICATIONS:  chlorhexidine 2% Cloths 1 Application(s) Topical <User Schedule>      IMAGING: HISTORY:  65 y/o male w/ a PMHx of CAD s/p CABG (2020), AF on Eliquis c/b CVA (2019) c/b seizures, HTN, HLD, DM type II, hypothyroidism, GI bleed due to a duodenal ulcer (2/2022), and ESRD s/p DDRT (4/21/22) c/b DGF requiring HD (last session 4/29/22) & large perinephric hematoma s/p evacuation w/ revision of arterial anastomosis (5/2/22) who presented on 6/10/22 for status epilepticus. Patient was intubated for airway protection. AEDs were adjusted and he was successfully extubated on 6/11. Patient was noted to have a large right pleural effusion so thoracentesis was performed w/ 1.3 L of serosanguineous fluid drained. Patient was started on a heparin infusion for his atrial fibrillation post-procedure but had a H&H drop w/ CXR demonstrating complete right lung whiteout concerning for a hemothorax. Patient was transferred to SICU for further management. He was intubated for airway protection again, transfused several products, and reversed w/ protamine. A right 28 Fr chest tube was placed w/ a significant amount of sanguineous fluid drained. He was noted to have a significant amount of residual hemothorax on CT on 6/12 so he eventually was taken to the OR on 6/15 for a right VATS for hematoma washout and placement of a second chest tube. He was transferred to the floors on 6/17. Both chest tubes were removed on 6/18. He went into status epilepticus again on 6/25 so he was readmitted to SICU. He was intubated for airway protection again on 6/26 and successfully extubated on 6/28. Hospital course has also been significant for delirium, dysphagia requiring NGT placement for enteral access, hyperkalemia, and poor glycemic control. Patient currently denies headache, fevers, chills, dizziness, weakness, shortness of breath, chest pain, abdominal pain, or nausea/vomiting.    24 HOUR EVENTS:  - Increased Miralax from daily to BID and gave milk of magnesia 30 mL x1 dose for constipation  - Required straight catheterization for 600 mL, 650 mL, & 450 mL so increased doxazosin from 2 mg -> 4 mg at bedtime  - Eliquis held for possible PEG placement so started Lovenox for VTE prophylaxis  - Decreased free water from 200 mL q8hrs -> q12hrs    NEURO  Exam: awake, alert, oriented x2-3, restless & combative at times, moving all four extremities  Meds:  - acetaminophen    Suspension .. 650 milliGRAM(s) Enteral Tube every 6 hours PRN Mild Pain (1 - 3)  - fosphenytoin IVPB 100 milliGRAM(s) PE IV Intermittent every 8 hours  - perampanel 4 milliGRAM(s) Oral at bedtime    RESPIRATORY  RR: 21 (07-03-22 @ 05:00) (10 - 24)  SpO2: 100% (07-03-22 @ 05:00) (93% - 100%)  Exam: mild rhonchi in all lung fields    CARDIOVASCULAR  HR: 53 (07-03-22 @ 05:00) (50 - 60)  BP: 148/65 (07-03-22 @ 04:00) (128/60 - 154/67)  BP(mean): 92 (07-03-22 @ 04:00) (86 - 117)  ABP: 159/52 (07-02-22 @ 10:00) (159/52 - 169/54)  ABP(mean): 89 (07-02-22 @ 10:00) (89 - 98)  Exam: irregularly irregular  Cardiac Rhythm: atrial fibrillation  Perfusion    [x]Adequate    [ ]Inadequate  Mentation   [x]Normal       [ ]Reduced  Extremities  [x]Warm         [ ]Cool  Volume Status [ ]Hypervolemic [x]Euvolemic [ ]Hypovolemic  Meds:  - amLODIPine   Tablet 10 milliGRAM(s) Oral daily  - hydrALAZINE 100 milliGRAM(s) Oral every 8 hours  - hydrALAZINE Injectable 10 milliGRAM(s) IV Push three times a day PRN SBP >160    GI/NUTRITION  Exam: soft, nontender, nondistended, RLQ incision well-healed, NGT to enteral nutrition  Diet: NPO w/ Glucerna 1.5 at 50 mL/hr via NGT  Meds:  - pantoprazole   Suspension 40 milliGRAM(s) Oral daily  - polyethylene glycol 3350 17 Gram(s) Oral two times a day  - senna Syrup 10 milliLiter(s) Oral daily    GENITOURINARY  I&O's Detail  07-02 @ 07:01  -  07-03 @ 05:31  --------------------------------------------------------  IN:    Enteral Tube Flush: 360 mL    Free Water: 400 mL    Glucerna 1.5: 1050 mL    IV PiggyBack: 50 mL    IV PiggyBack: 150 mL  Total IN: 2010 mL    OUT:    Intermittent Catheterization - Urethral (mL): 1700 mL  Total OUT: 1700 mL    Total NET: 310 mL    139  |  100  |  34<H>  ----------------------------<  166<H>  5.2   |  27  |  1.45<H>    Ca    8.6      03 Jul 2022 00:23  Phos  3.0  Mg     2.1  TPro  6.1  /  Alb  2.6<L>  /  TBili  0.2  /  DBili  x   /  AST  22  /  ALT  14  /  AlkPhos  131<H>    Meds:  - doxazosin 4 milliGRAM(s) Oral daily  - mycophenolate mofetil Suspension 500 milliGRAM(s) Oral every 12 hours  - predniSONE  Solution 5 milliGRAM(s) Oral daily    HEMATOLOGIC  Meds:  - enoxaparin Injectable 40 milliGRAM(s) SubCutaneous every 24 hours  - epoetin cortney-epbx (RETACRIT) Injectable 81198 Unit(s) SubCutaneous every 7 days                        9.1    6.17  )-----------( 382      ( 03 Jul 2022 00:21 )             29.1     PT/INR - ( 03 Jul 2022 00:21 )   PT: 16.1 sec;   INR: 1.40 ratio    PTT - ( 03 Jul 2022 00:21 )  PTT:37.8 sec    INFECTIOUS DISEASES  T(C): 36.2 (07-03-22 @ 03:00), Max: 36.3 (07-02-22 @ 15:00)  WBC Count: 6.17 K/uL (07-03 @ 00:21)    Recent Cultures:  - Specimen Source: .CSF CSF, 06-29 @ 13:09  Results: Culture is being performed.  Gram Stain: No polymorphonuclear leukocytes seen. No organisms seen by cytocentrifuge  Organism: --  - Specimen Source: .Other Other, 06-28 @ 05:06  Results: No Nocardia isolated  Gram Stain: --  Organism: --  - Specimen Source: .Blood Blood-Peripheral, 06-27 @ 23:27  Results: No Growth Final  Gram Stain: --  Organism: --  - Specimen Source: Combi-Cath Combi-Cath, 06-26 @ 18:08  Results: Normal Respiratory Rachel present  Gram Stain: Few polymorphonuclear leukocytes per low power field. Rare Squamous Epithelial Cells per low power field. Rare Gram Positive Rods per oil power field.  Organism: --  - Specimen Source: .Blood Blood-Peripheral, 06-26 @ 09:01  Results: No Growth Final  Gram Stain: --  Organism: --  - Specimen Source: .Blood Blood-Peripheral, 06-26 @ 08:14  Results: Growth in anaerobic bottle: Staphylococcus epidermidis & Coag Negative Staphylococcus  Gram Stain: Growth in anaerobic bottle: Gram positive cocci in pairs  Organism: Blood Culture PCR - Staphylococcus epidermidis & Coag Negative Staphylococcus  - Specimen Source: Catheterized Catheterized, 06-26 @ 06:25  Results: No growth  Gram Stain: --  Organism: --    Meds:  - fluconAZOLE IVPB 200 milliGRAM(s) IV Intermittent every 24 hours  - valGANciclovir 50 mG/mL Oral Solution 450 milliGRAM(s) Oral daily    ENDOCRINE  Capillary Blood Glucose:  - 135 mg/dL (02 Jul 2022 22:12)  - 167 mg/dL (02 Jul 2022 14:44)  - 194 mg/dL (02 Jul 2022 05:53)  Meds:  - atorvastatin 40 milliGRAM(s) Oral at bedtime  - insulin lispro (ADMELOG) corrective regimen sliding scale   SubCutaneous every 8 hours  - insulin NPH human recombinant 2 Unit(s) SubCutaneous every 8 hours  - levothyroxine Injectable 40 MICROGram(s) IV Push at bedtime    ACCESS DEVICES:  [x] Peripheral IV  [ ] Central Venous Line		[ ] R	[ ] L	[ ] IJ	[ ] Fem	[ ] SC	Placed:   [ ] Arterial Line			[ ] R	[ ] L	[ ] Fem	[ ] Rad	[ ] Ax	Placed:   [ ] PICC:					[ ] Mediport  [ ] Urinary Catheter, Date Placed:   [x] Necessity of urinary, arterial, and venous catheters discussed    OTHER MEDICATIONS: chlorhexidine 2% Cloths 1 Application(s) Topical <User Schedule>    IMAGING:

## 2022-07-03 NOTE — PROGRESS NOTE ADULT - SUBJECTIVE AND OBJECTIVE BOX
24 HOUR EVENTS:  -Eliquis held in anticipation of possible PEG  -Straight cathx3 for urinary retention     NEURO  RASS: 0    GCS: 12-13       Exam: awake, alert, oriented to self but waxes/wanes w/orientation to year but not to place.   Meds: acetaminophen    Suspension .. 650 milliGRAM(s) Enteral Tube every 6 hours PRN Mild Pain (1 - 3)  fosphenytoin IVPB 100 milliGRAM(s) PE IV Intermittent every 8 hours  perampanel 4 milliGRAM(s) Oral at bedtime    [x] Adequacy of sedation and pain control has been assessed and adjusted      RESPIRATORY  RR: 23 (07-01-22 @ 21:00) (13 - 23)  SpO2: 97% (07-01-22 @ 21:00) (95% - 100%)  Wt(kg): --  Exam: unlabored, clear to auscultation bilaterally        CARDIOVASCULAR  HR: 60 (07-01-22 @ 21:00) (51 - 60)  BP: 156/70 (07-01-22 @ 08:00) (156/70 - 156/70)  BP(mean): 101 (07-01-22 @ 08:00) (101 - 101)  ABP: 151/50 (07-01-22 @ 21:00) (138/44 - 173/57)  ABP(mean): 85 (07-01-22 @ 21:00) (73 - 97)  Wt(kg): --  CVP(cm H2O): --      Exam: regular rate and rhythm  Cardiac Rhythm: sinus  Perfusion     [x]Adequate   [ ]Inadequate  Mentation   [x]Normal       [ ]Reduced  Extremities  [x]Warm         [ ]Cool  Volume Status [ ]Hypervolemic [x]Euvolemic [ ]Hypovolemic  Meds: amLODIPine   Tablet 10 milliGRAM(s) Oral <User Schedule>  doxazosin 2 milliGRAM(s) Oral daily  hydrALAZINE 100 milliGRAM(s) Oral every 8 hours  hydrALAZINE Injectable 10 milliGRAM(s) IV Push three times a day PRN SBP >160        GI/NUTRITION  Exam: soft, nontender, nondistended, incision C/D/I  Diet: TF at goal   Meds: pantoprazole   Suspension 40 milliGRAM(s) Oral daily  polyethylene glycol 3350 17 Gram(s) Oral daily  senna Syrup 10 milliLiter(s) Oral daily      GENITOURINARY  I&O's Detail    06-30 @ 07:01  -  07-01 @ 07:00  --------------------------------------------------------  IN:    Enteral Tube Flush: 150 mL    Glucerna 1.5: 1200 mL    Heparin: 24 mL    IV PiggyBack: 100 mL    IV PiggyBack: 100 mL  Total IN: 1574 mL    OUT:    Indwelling Catheter - Urethral (mL): 2185 mL  Total OUT: 2185 mL    Total NET: -611 mL      07-01 @ 07:01  -  07-02 @ 01:03  --------------------------------------------------------  IN:    Enteral Tube Flush: 200 mL    Free Water: 200 mL    Glucerna 1.5: 600 mL    IV PiggyBack: 102 mL    IV PiggyBack: 100 mL  Total IN: 1202 mL    OUT:    Indwelling Catheter - Urethral (mL): 1120 mL  Total OUT: 1120 mL    Total NET: 82 mL          07-01    136  |  101  |  36<H>  ----------------------------<  196<H>  5.0   |  28  |  1.42<H>    Ca    8.3<L>      01 Jul 2022 17:43  Phos  3.0     07-01  Mg     2.3     07-01    TPro  6.1  /  Alb  2.4<L>  /  TBili  0.2  /  DBili  x   /  AST  19  /  ALT  12  /  AlkPhos  128<H>  07-01    [x] Miranda catheter, indication: strict I/Os   Meds:       HEMATOLOGIC  Meds: apixaban 5 milliGRAM(s) Enteral Tube every 12 hours    [x] VTE Prophylaxis                        8.9    5.84  )-----------( 351      ( 01 Jul 2022 17:43 )             27.9     PT/INR - ( 01 Jul 2022 05:52 )   PT: 16.6 sec;   INR: 1.44 ratio         PTT - ( 01 Jul 2022 05:52 )  PTT:39.4 sec  Transfusion     [ ] PRBC   [ ] Platelets   [ ] FFP   [ ] Cryoprecipitate      INFECTIOUS DISEASES  WBC Count: 5.84 K/uL (07-01 @ 17:43)    RECENT CULTURES:  Specimen Source: .CSF CSF  Date/Time: 06-29 @ 13:09  Culture Results:   No growth to date.  Gram Stain:   No polymorphonuclear leukocytes seen  No organisms seen  by cytocentrifuge  Organism: --  Specimen Source: .Other Other  Date/Time: 06-28 @ 05:06  Culture Results:   No Nocardia isolated  Gram Stain: --  Organism: --  Specimen Source: .Blood Blood-Peripheral  Date/Time: 06-27 @ 23:27  Culture Results:   No growth to date.  Gram Stain: --  Organism: --    Meds: epoetin cortney-epbx (RETACRIT) Injectable 28312 Unit(s) SubCutaneous every 7 days  fluconAZOLE IVPB 200 milliGRAM(s) IV Intermittent every 24 hours  mycophenolate mofetil Suspension 500 milliGRAM(s) Oral every 12 hours  valGANciclovir 50 mG/mL Oral Solution 450 milliGRAM(s) Oral daily        ENDOCRINE  CAPILLARY BLOOD GLUCOSE  193 (01 Jul 2022 17:00)      POCT Blood Glucose.: 178 mg/dL (01 Jul 2022 22:42)  POCT Blood Glucose.: 195 mg/dL (01 Jul 2022 17:30)  POCT Blood Glucose.: 233 mg/dL (01 Jul 2022 11:58)  POCT Blood Glucose.: 153 mg/dL (01 Jul 2022 09:02)  POCT Blood Glucose.: 76 mg/dL (01 Jul 2022 05:37)    Meds: atorvastatin 40 milliGRAM(s) Oral at bedtime  insulin lispro (ADMELOG) corrective regimen sliding scale   SubCutaneous every 8 hours  insulin NPH human recombinant 2 Unit(s) SubCutaneous every 8 hours  levothyroxine Injectable 40 MICROGram(s) IV Push at bedtime  predniSONE  Solution 5 milliGRAM(s) Oral daily        ACCESS DEVICES:  [x] Peripheral IV  [ ] Central Venous Line	[ ] R	[ ] L	[ ] IJ	[ ] Fem	[ ] SC	Placed:   [ ] Arterial Line		[ ] R	[ ] L	[ ] Fem	[ ] Rad	[ ] Ax	Placed:   [ ] PICC:					[ ] Mediport  [ ] Urinary Catheter, Date Placed:   [x] Necessity of urinary, arterial, and venous catheters discussed    OTHER MEDICATIONS:  chlorhexidine 2% Cloths 1 Application(s) Topical <User Schedule>      CODE STATUS: full code

## 2022-07-03 NOTE — PROGRESS NOTE ADULT - ASSESSMENT
66M with h/o DM type II, HTN, CAD s/p CABG in 2020, Afib on Eliquis, CVA in 2019 due to Afib, Seizure d/o (last episode was on 4/8/22), h/o GI bleed in 2/2022 EGD with duodenal ulcer and ESRD on HD s/p R DDRT from DCD donor on 4/21/22 complicated by DGF requiring HD until 4/29/22 now with good graft function who presented with status epilepticus in setting of UTI/antibiotics and sub-therapeutic valproic acid level     Seizure Disorder  - repeat MRI head 6/27 with less concerns for PRES, ischemic changes  - remains seizure free  - Antiepileptic regimen per Neurology, appreciate recs. please follow closely with team  - Keep Mag > 2  - infectious w/u negative so far, off ABX.  Remains on Fluc.  Transplant ID following, appreciate recs    R DCD DDRT (ureter stented) 4/21/22  - renal function stable  - Strict I/Os, replace tucker prn, can add Flomax or Proscar prn  - TFs to goal. may consider PEG next week if mental status remains unchanged  - will remove stent prior to discharge    Immunosuppression:   - Belatacept #1 6/23.  #2 due on  6/30- not given  - continue MMF 500mg BID  - continue Pred 5  - PPx:  continue Bactrim MWF and PPI    DM  - on Lantus/Lispro, Endocrine following     AFib:  - continue Retacrit weekly  - continue Eliquis  - rate controlled    R IJ DVT  - continue Eliquis    HTN:  - continue Norvasc/Hydralazine/Cardura    Dispo  - continue excellent SICU care

## 2022-07-03 NOTE — PROGRESS NOTE ADULT - ASSESSMENT
Patient is a 65 y/o male w/ a PMHx of CAD s/p CABG (2020), AF on Eliquis c/b CVA (2019) c/b seizures, HTN, HLD, DM type II, hypothyroidism, GI bleed due to a duodenal ulcer (2/2022), and ESRD s/p DDRT (4/21/22) c/b DGF requiring HD (last session 4/29/22) & large perinephric hematoma s/p evacuation w/ revision of arterial anastomosis (5/2/22) who presented on 6/10 for status epilepticus. Course complicated by hemothorax, s/p VATS, now with recurrent refractory seizures secondary to tacrolimus toxicity  vs PRES syndrome vs meningitis and intubated for airway protection x3 during admission. Pt extubated 6/28 and remains in SICU for further assessment and management.       Neuro: Seizures with concern of status epilepticus 2/2 PRES syndrome vs CNS infection   - Intermittently follows commands; A/O x1-2 to name/year   - No seizures since 6/26; EEG d/darren ( 6/28)   - Continue Antiepileptics Perampanel 4mg  and [id number] q8  ; d/darren Valproic Acid 2/2 ongoing lethargy   - CSF 6/29 neg for infectious etiology w/ protein 46, glucose 125 2/2 s/p status epilepticus. CSF cx and HSV PCR neg. Will f/u VDRL, oligoclonal bands.   - MRI ( 6/27) showed encephalomalacia 2/2 htn encephalopathy vs ischemic changes. Unchanged from previous, no change in management per Neuro   - Pain Control with tylenol PRN    Resp: s/p VATS evacuation of hemothorax now intubated for airway protection  - Extubated 6/28, tolerating RA; Maintain SpO2 >92%   - Chest tubes removed 6/18      CVS: Hypertension, Afib; Bradycardia now resolved   - Continue Amlodipine  - Systolics still >150 with hydral 100mg, amlodopine. Cardura added   - Continue Atorvastatin 40mg    GI: TF  - Keofeed continue TF Jevity 1.5 goal 50cc/hr at goal  - Bowel regimen with senna & Miralax  - Continue home Protonix  - Possible PEG Monday v Tuesday    Renal: ESRD s/p DDRT (4/21/22) c/b DGF requiring HD (last session 4/29/22) & large perinephric hematoma s/p evacuation w/ revision of arterial anastomosis (5/2/22)   - ANA 2/2 hemodynamic fluctuations improving   - making adequate UO  - Miranda in place for strict I/Os   - (6/30)diuresed 40mgx1 s/p transfusion with good UO response   - lokelma d/darren   - Continue prednisone, Retacrit, mycophenolic acid and Belatacept as per transplant     Heme: RUE and RIJ DVT + A/C for afib  - Eliquis on hold in anticipation of possible PEG   - Monitor CBC and coags  - SCD's    ID: ?sepsis 2/2 asp pna vs cns infx    - Bcx ( 6/26) MRSE; f/u repeat BCx ( 6/27) neg  - Combi-Cath ( 6/26) negative, MRSA neg, UCx ( 6/26) neg   - Viral Workup: CMV, EBV, HSV 6, Adenovirus, Hep C, syphillis, Parovirus all negative   - s/p LP (6/29) negative   - Abx: s/p Vanco ( 6/26-27), s/p Meropenem for hx of ESBL in urine (6/26-6/29) ; continue fluconazole for ppx   - Ppx Antibc: s/p Ganciclovir, continue Valcyte, as per transplant; bactrim d/darren 2/2 hyperK  - F/u G6PD level     Endo: hx of DM, Hypothyroidism  - HbA1c 6.6 % ( 4/224)  - Monitor glucose, mISS q4 hr + NPH 2U q8hrs, as per Endo   - Continue levothyroxine

## 2022-07-03 NOTE — PROGRESS NOTE ADULT - SUBJECTIVE AND OBJECTIVE BOX
--------------------------------------------------------------------------------  Chief Complaint:  Patient is in ICU  24 hour events/subjective:  Reviewed      PAST HISTORY  --------------------------------------------------------------------------------  No significant changes to PMH, PSH, FHx, SHx, unless otherwise noted    ALLERGIES & MEDICATIONS  --------------------------------------------------------------------------------  Allergies    No Known Allergies    Intolerances      Standing Inpatient Medications  amLODIPine   Tablet 10 milliGRAM(s) Oral <User Schedule>  atorvastatin 40 milliGRAM(s) Oral at bedtime  chlorhexidine 2% Cloths 1 Application(s) Topical <User Schedule>  doxazosin 4 milliGRAM(s) Oral daily  enoxaparin Injectable 40 milliGRAM(s) SubCutaneous every 24 hours  epoetin cortney-epbx (RETACRIT) Injectable 71440 Unit(s) SubCutaneous every 7 days  fluconAZOLE IVPB 200 milliGRAM(s) IV Intermittent every 24 hours  fosphenytoin IVPB 100 milliGRAM(s) PE IV Intermittent every 8 hours  hydrALAZINE 100 milliGRAM(s) Oral every 8 hours  insulin lispro (ADMELOG) corrective regimen sliding scale   SubCutaneous every 8 hours  insulin NPH human recombinant 2 Unit(s) SubCutaneous every 8 hours  levothyroxine Injectable 40 MICROGram(s) IV Push at bedtime  mycophenolate mofetil Suspension 500 milliGRAM(s) Oral every 12 hours  pantoprazole   Suspension 40 milliGRAM(s) Oral daily  perampanel 4 milliGRAM(s) Oral at bedtime  polyethylene glycol 3350 17 Gram(s) Oral two times a day  predniSONE  Solution 5 milliGRAM(s) Oral daily  senna Syrup 10 milliLiter(s) Oral daily  valGANciclovir 50 mG/mL Oral Solution 450 milliGRAM(s) Oral daily    PRN Inpatient Medications  acetaminophen    Suspension .. 650 milliGRAM(s) Enteral Tube every 6 hours PRN      REVIEW OF SYSTEMS  --------------------------------------------------------------------------------  Patient wendy in ICU, disoriented, has feeding tube, intermittent straight cath    All other systems were reviewed and are negative, except as noted.    VITALS/PHYSICAL EXAM  --------------------------------------------------------------------------------  T(C): 36.3 (07-03-22 @ 15:00), Max: 36.6 (07-03-22 @ 11:00)  HR: 53 (07-03-22 @ 18:00) (48 - 58)  BP: 145/67 (07-03-22 @ 18:00) (121/58 - 165/72)  RR: 13 (07-03-22 @ 18:00) (5 - 21)  SpO2: 100% (07-03-22 @ 18:00) (94% - 100%)          07-02-22 @ 07:01  -  07-03-22 @ 07:00  --------------------------------------------------------  IN: 2310 mL / OUT: 2200 mL / NET: 110 mL    07-03-22 @ 07:01  -  07-03-22 @ 18:18  --------------------------------------------------------  IN: 880 mL / OUT: 600 mL / NET: 280 mL      Physical Exam:  	Gen: Resting  	HEENT: NG tube  	Pulm: CTA B/L  	CV: no gallop; no rub  	Abd: +BS, soft, nontender/nondistended                      Transplant: No tenderness  	: No suprapubic tenderness; getting intermittent straight cath  	UE: no involuntary movements  	LE:  no edema  	Neuro: Conscious, disoriented  	Psych: Normal affect and mood  	Skin: Warm, without rashes      LABS/STUDIES  --------------------------------------------------------------------------------              8.8    5.25  >-----------<  356      [07-03-22 @ 06:05]              28.2     139  |  101  |  35  ----------------------------<  178      [07-03-22 @ 06:04]  5.1   |  26  |  1.45        Ca     8.6     [07-03-22 @ 06:04]      Mg     2.2     [07-03-22 @ 06:04]      Phos  3.0     [07-03-22 @ 06:04]    TPro  5.9  /  Alb  2.4  /  TBili  0.2  /  DBili  x   /  AST  18  /  ALT  11  /  AlkPhos  126  [07-03-22 @ 06:04]    PT/INR: PT 15.3 , INR 1.33       [07-03-22 @ 06:05]  PTT: 38.0       [07-03-22 @ 06:05]      Creatinine Trend:  SCr 1.45 [07-03 @ 06:04]  SCr 1.45 [07-03 @ 00:23]  SCr 1.48 [07-02 @ 03:23]  SCr 1.42 [07-01 @ 17:43]  SCr 1.50 [07-01 @ 05:52]      Urinalysis - [07-03-22 @ 14:32]      Color Light Yellow / Appearance Clear / SG 1.018 / pH 8.0      Gluc Negative / Ketone Negative  / Bili Negative / Urobili Negative       Blood Negative / Protein Trace / Leuk Est Negative / Nitrite Negative      RBC 1 / WBC 2 / Hyaline 0 / Gran  / Sq Epi  / Non Sq Epi 1 / Bacteria Negative      Iron 17, TIBC 171, %sat 10      [05-02-22 @ 13:03]  Ferritin 1505      [05-02-22 @ 13:05]  PTH -- (Ca 9.2)      [02-05-22 @ 10:13]   294  HbA1c 6.0      [02-21-20 @ 08:53]      Syphilis Screen (Treponema Pallidum Ab) Negative      [06-27-22 @ 02:00]

## 2022-07-03 NOTE — PROGRESS NOTE ADULT - PROVIDER SPECIALTY LIST ADULT
ADULT DERMATOLOGY PROGRESS NOTE  NEW VISIT    6/29/2020  Bell Oshea  1957  MRN: [id number]      Bell Oshea is a 63 year old female who presents with the following cutaneous concerns:     #1 red spots   Location: right arm   Onset: Years    Duration: constant   POSITIVE qualities: crusty  NEGATIVE qualities: painful, bleeding, growing, changing and itchy  Treatment: None       #2 Lesion   Location: nose   Onset: ~2-3 years    Duration: waxes and wanes    POSITIVE qualities: crusty  NEGATIVE qualities: painful, bleeding, growing, changing and itchy  Treatment: None      Patient feels otherwise well with no additional cutaneous concerns today.          PAST DERMATOLOGIC HISTORY:   None     Past Medical History:   Diagnosis Date   • Diverticulosis    • HTN (hypertension)    • Hyperlipidemia    • Osteoporosis    • Tension headache        FAMILY HISTORY:   History of skin cancer--Negative    SOCIAL HISTORY:   Tanning Bed Use:  yes Former      Tobacco use:  no     ETOH:  no     Illicit drug use:  no    ALLERGIES:   Allergen Reactions   • Penicillins Other (See Comments)     Mouth sores   • Sulfa Antibiotics Other (See Comments)     Mouth sores       Current Outpatient Medications   Medication Sig Dispense Refill   • cholecalciferol (VITAMIN D3) 25 mcg (1,000 units) tablet Take 2,000 Units by mouth daily.     • Multiple Vitamins-Minerals (VITAMIN - THERAPEUTIC MULTIVITAMINS W/MINERALS) tablet Take 1 tablet by mouth daily.     • loratadine (CLARITIN) 10 MG tablet Take 10 mg by mouth daily.     • lisinopril-hydroCHLOROthiazide (ZESTORETIC) 20-25 MG per tablet Take 1 tablet by mouth daily. 90 tablet 4     No current facility-administered medications for this visit.        REVIEW OF SYSTEMS:  No nausea, vomiting, fevers, chills, diarrhea, joint pain, weight loss, fatigue, cough, dizziness, weakness, depression, headaches, blood in stool/urine, visual disturbance, decreased appetite.    EXAM:   Visit Vitals  Temp 98.7  °F (37.1 °C) (Tympanic)   Resp 18   Ht 5' 2\" (1.575 m)   Wt 100.2 kg   BMI 40.40 kg/m²       The patient is a well developed Martinez type 2 female and is alert and oriented x 3, normal mood and affect and is in no acute distress. Examination of the face, right upper extremity and left upper extremity was performed and findings were within normal limits unless specified below (Physical exam findings noted in A/P)    IMPRESSION / PLAN:     Actinic keratoses (diffusley scattered on the b/l forearms; pink gritty papules on the nose)  - discussed diagnosis and that lesions are precancerous, recommend treatment  - treatment options including cryosurgery vs topical chemotherapeutic vs PDT including risks and benefits discussed.  - Patient to watch for the ABCD's of pigmented lesions or persistent crusts, erosions, irritated areas, especially if pink/red. Technique of skin self-exam discussed with patient and given the AAD ([employer]at[employer]) information on this.   - Observe closely for skin damage/changes, and call if such occurs.  -recommended PDT vs Efudex treatment to the b/l arms.  -patient opts to treat via PDT, will call to schedule for PDT in the fall/winter  -thicker lesions on the right forearm treated today.   -will treat AK's on face at PDT visit    Recommended cryotherapy for treatment.  Discussed expected skin response, healing time, pain/discomfort, crusting, possible blister formation and risk of hypopigmentation and scar.  Patient indicated understanding and elected to proceed.  Lesion(s) treated with cryotherapy, one 20-second freeze/thaw cycle to 8 total lesion(s).  Patient tolerated well.  Wound care instructions given and discussed.         RTC: For PDT in the fall/ winter    Call sooner if any problems or concerns.    Thank you Zeynep Clements MD  for allowing us to participate in the care of your patient.      On 6/29/2020, Graciela FUENTES MA scribed the services personally  performed by Sruthi Sanches MD   The documentation recorded by the scribe accurately and completely reflects the service(s) I personally performed and the decisions made by me.     Lashell Sanches MD  [facility]       Transplant Nephrology

## 2022-07-03 NOTE — PROGRESS NOTE ADULT - SUBJECTIVE AND OBJECTIVE BOX
Neurology Progress Note    SUBJECTIVE/OBJECTIVE/INTERVAL EVENTS: Patient seen and examined at bedside.     INTERVAL HISTORY:     VITALS & EXAMINATION:  Vital Signs Last 24 Hrs  T(C): 36.3 (2022 15:00), Max: 36.6 (2022 11:00)  T(F): 97.3 (2022 15:00), Max: 97.9 (2022 11:00)  HR: 53 (2022 16:00) (48 - 58)  BP: 129/62 (2022 16:00) (121/58 - 165/72)  BP(mean): 89 (2022 16:00) (83 - 117)  RR: 14 (2022 16:00) (5 - 21)  SpO2: 98% (2022 16:00) (94% - 100%)    LABORATORY:  CBC                       8.8    5.25  )-----------( 356      ( 2022 06:05 )             28.2     Chem 07-03    139  |  101  |  35<H>  ----------------------------<  178<H>  5.1   |  26  |  1.45<H>    Ca    8.6      2022 06:04  Phos  3.0     07-03  Mg     2.2     07-03    TPro  5.9<L>  /  Alb  2.4<L>  /  TBili  0.2  /  DBili  x   /  AST  18  /  ALT  11  /  AlkPhos  126<H>  07-03    LFTs LIVER FUNCTIONS - ( 2022 06:04 )  Alb: 2.4 g/dL / Pro: 5.9 g/dL / ALK PHOS: 126 U/L / ALT: 11 U/L / AST: 18 U/L / GGT: x           Coagulopathy PT/INR - ( 2022 06:05 )   PT: 15.3 sec;   INR: 1.33 ratio       PTT - ( 2022 06:05 )  PTT:38.0 sec  Lipid Panel   A1c   Cardiac enzymes     U/A Urinalysis Basic - ( 2022 14:32 )    Color: Light Yellow / Appearance: Clear / S.018 / pH: x  Gluc: x / Ketone: Negative  / Bili: Negative / Urobili: Negative   Blood: x / Protein: Trace / Nitrite: Negative   Leuk Esterase: Negative / RBC: 1 /hpf / WBC 2 /HPF   Sq Epi: x / Non Sq Epi: 1 /hpf / Bacteria: Negative      CSF  Immunological  Other    STUDIES & IMAGING: (EEG, CT, MR, U/S, TTE/DINAH):    < from: MR Head w/wo IV Cont (22 @ 15:23) >    ACC: [id number] EXAM:  MR BRAIN WAW IC                          PROCEDURE DATE:  2022          INTERPRETATION:  CLINICAL INDICATION: Altered mental status, seizures      Magnetic resonance imaging of the brain was carried out with transaxial   SPGR, FLAIR, fast spin echo T2 weighted images, axial susceptibility   weighted series, diffusion weighted series and sagittal T1 weighted   series on a 1.5 Anisa magnet. Post contrast axial, coronal and sagittal   T1 weighted images were obtained. 6 cc of Gadavist were intravenously   injected, 1.5 cc were discarded.    Comparison is made with the prior brain CT of 2022.      Mild to moderate atrophy is identified with ventricular and sulcal   prominence. Extensive small vessel white matter ischemic changes are   noted. There is extensive bifrontal parietal encephalomalacia and gliosis   involving the centrum semiovale ovale in the border zone regions. These   do not demonstrate diffusion restriction and demonstrate bright signal   intensity on T1-weighted images which does not lymphoma on susceptibility   weighted series suggesting laminar necrosis rather than old hemorrhage.   After contrast administration there is mild gyral enhancement in a small   portion of the frontal cortex. There is no evidence of cerebritis or   abscess.        IMPRESSION: Extensive bilateral frontal parietal gliosis and   encephalomalacia with minimal enhancement which may be related to   ischemic changes versus changes of hypertensive encephalopathy in a   patient with renal transplant on immunosuppressants. No evidence of   cerebritis or abscess.    --- End of Report ---    TIMOTHY GOMES MD; Attending Radiologist  This document has been electronically signed. 2022  4:03PM    < end of copied text >   Neurology Progress Note    SUBJECTIVE/OBJECTIVE/INTERVAL EVENTS: Patient seen and examined at bedside. Patient continues to be delirious and perseverates. Nurse at bedside reports patient has been agitated, hitting staff and cursing and there was concern it may be attributable to his seizure medications.    VITALS & EXAMINATION:  Vital Signs Last 24 Hrs  T(C): 36.3 (2022 15:00), Max: 36.6 (2022 11:00)  T(F): 97.3 (2022 15:00), Max: 97.9 (2022 11:00)  HR: 53 (2022 16:00) (48 - 58)  BP: 129/62 (2022 16:00) (121/58 - 165/72)  BP(mean): 89 (2022 16:00) (83 - 117)  RR: 14 (2022 16:00) (5 - 21)  SpO2: 98% (2022 16:00) (94% - 100%)    General appearance: does not appear to be in pain  Head: normocephalic  Eyes: clear sclera  Extremities: no ezio LE edema  Respiratory: breathing regularly    Focused neurologic exam:  MS - , attends to all stimuli b/l, minimal speech output, does not follow commands. [facility] orientation, rep/naming, attn/conc, recent and remote memory, fund of knowledge  CN - PERRL, EOMI by OCR, (+) face sens/str by corneals b/l, (+) spontaneous respirations, (+) cough, VFF to threat b/l, hearing intact to conversation b/l, SCM at least 4+/5 b/l and symmetric. PERRY tongue/palate  Motor - Normal bulk. Inc tone in L side (paratonic?) and normal tone in R side. Spontaneous movements of L > R side. Grimaces, withdraws, and localizes to strong noxious in LUE 4+/5, LLE 4+/5, RUE 3+/5, RLE 2/5  Sens - LT/temp intact all, as above  DTR's - 2+ BUE's, 3+ R KJ, 2+ L KJ, 1+ AJ b/l, and downgoing b/l plantar response  Coord - [facility]  Gait and station - [facility]    LABORATORY:  CBC                       8.8    5.25  )-----------( 356      ( 2022 06:05 )             28.2     Chem 07-    139  |  101  |  35<H>  ----------------------------<  178<H>  5.1   |  26  |  1.45<H>    Ca    8.6      2022 06:04  Phos  3.0     07-03  Mg     2.2     -    TPro  5.9<L>  /  Alb  2.4<L>  /  TBili  0.2  /  DBili  x   /  AST  18  /  ALT  11  /  AlkPhos  126<H>  07-03    LFTs LIVER FUNCTIONS - ( 2022 06:04 )  Alb: 2.4 g/dL / Pro: 5.9 g/dL / ALK PHOS: 126 U/L / ALT: 11 U/L / AST: 18 U/L / GGT: x           Coagulopathy PT/INR - ( 2022 06:05 )   PT: 15.3 sec;   INR: 1.33 ratio       PTT - ( 2022 06:05 )  PTT:38.0 sec  Lipid Panel   A1c   Cardiac enzymes     U/A Urinalysis Basic - ( 2022 14:32 )    Color: Light Yellow / Appearance: Clear / S.018 / pH: x  Gluc: x / Ketone: Negative  / Bili: Negative / Urobili: Negative   Blood: x / Protein: Trace / Nitrite: Negative   Leuk Esterase: Negative / RBC: 1 /hpf / WBC 2 /HPF   Sq Epi: x / Non Sq Epi: 1 /hpf / Bacteria: Negative    CSF  Immunological  Other    STUDIES & IMAGING: (EEG, CT, MR, U/S, TTE/DINAH):    < from: MR Head w/wo IV Cont (22 @ 15:23) >    ACC: [id number] EXAM:  MR BRAIN WAW IC                          PROCEDURE DATE:  2022      INTERPRETATION:  CLINICAL INDICATION: Altered mental status, seizures    Magnetic resonance imaging of the brain was carried out with transaxial   SPGR, FLAIR, fast spin echo T2 weighted images, axial susceptibility   weighted series, diffusion weighted series and sagittal T1 weighted   series on a 1.5 Anisa magnet. Post contrast axial, coronal and sagittal   T1 weighted images were obtained. 6 cc of Gadavist were intravenously   injected, 1.5 cc were discarded.    Comparison is made with the prior brain CT of 2022.    Mild to moderate atrophy is identified with ventricular and sulcal   prominence. Extensive small vessel white matter ischemic changes are   noted. There is extensive bifrontal parietal encephalomalacia and gliosis   involving the centrum semiovale ovale in the border zone regions. These   do not demonstrate diffusion restriction and demonstrate bright signal   intensity on T1-weighted images which does not lymphoma on susceptibility   weighted series suggesting laminar necrosis rather than old hemorrhage.   After contrast administration there is mild gyral enhancement in a small   portion of the frontal cortex. There is no evidence of cerebritis or   abscess.    IMPRESSION: Extensive bilateral frontal parietal gliosis and   encephalomalacia with minimal enhancement which may be related to   ischemic changes versus changes of hypertensive encephalopathy in a   patient with renal transplant on immunosuppressants. No evidence of   cerebritis or abscess.    --- End of Report ---    TIMOTHY GOMES MD; Attending Radiologist  This document has been electronically signed. 2022  4:03PM    < end of copied text >      vEEG  -  EEG SUMMARY/CLASSIFICATION    Abnormal EEG in an altered / a sedated patient.  - Rare right frontal and left frontal sharp waves  - Patient events as described above  - Left frontotemporal slowing  - Mild to moderate generalized slowing    _____________________________________________________________  EEG IMPRESSION/CLINICAL CORRELATE    Abnormal EEG study.  Event of left leg shaking was not epileptic.  Potential epileptogenic foci in the right frontal and left frontal regions.  Structural or functional abnormality in the left frontotemporal region.  Mild to moderate nonspecific diffuse or multifocal cerebral dysfunction.   No seizure seen.      MRI brain w/ and w/o  - Extensive bilateral frontal parietal gliosis and encephalomalacia with minimal enhancement which may be related to ischemic changes versus changes of hypertensive encephalopathy in a patient with renal transplant on immunosuppressants. No evidence of cerebritis or abscess.      < from: CT Head No Cont (22 @ 10:29) >    ACC: [id number] EXAM:  CT BRAIN                          PROCEDURE DATE:  2022      INTERPRETATION:  .    CLINICAL INFORMATION: Worsening lethargy. Altered mental status.    TECHNIQUE: Multiple axial CT images of the head were obtained without   contrast. Sagittal and coronal reconstructed images were acquired from   the source data.    COMPARISON: Prior head CT study from 2022. Prior brain MRI study   from 2022.    FINDINGS: Extensive encephalomalacia and gliosis are again noted within   the high bilateral cerebral hemispheres.. Overall appearance is unchanged.    There is no acute intracranial hemorrhage, mass effect, shift of the   midline structures, herniation, hydrocephalus, abnormal extra-axial fluid   collection.    There is diffuse cerebral volume loss with prominence of the sulci,   fissures, and cisternal spaces which is normal for the patient's age.   There is moderate patchy confluent periventricular white matter   hypoattenuation statistically compatible with microvascular changes given   calcific atherosclerotic disease of the intracranial arteries.    The paranasal sinuses and mastoid air cells are clear. The calvarium   appears intact. There is evidence of bilateral cataract removal.    IMPRESSION:No acute intracranial hemorrhage.    Similar-appearing extensive encephalomalacia and gliosis within the   bilateral high cerebral hemispheres.    Similar-appearing moderate severity chronic white matter microvascular   type changes. Neurology Progress Note    SUBJECTIVE/OBJECTIVE/INTERVAL EVENTS: Patient seen and examined at bedside. Patient continues to be delirious and perseverates. Nurse at bedside reports patient has been agitated, hitting staff and cursing and there was concern it may be attributable to his seizure medications.    VITALS & EXAMINATION:  Vital Signs Last 24 Hrs  T(C): 36.3 (2022 15:00), Max: 36.6 (2022 11:00)  T(F): 97.3 (2022 15:00), Max: 97.9 (2022 11:00)  HR: 53 (2022 16:00) (48 - 58)  BP: 129/62 (2022 16:00) (121/58 - 165/72)  BP(mean): 89 (2022 16:00) (83 - 117)  RR: 14 (2022 16:00) (5 - 21)  SpO2: 98% (2022 16:00) (94% - 100%)    General appearance: does not appear to be in pain  Head: normocephalic  Eyes: clear sclera  Extremities: no ezio LE edema  Respiratory: breathing regularly    Focused neurologic exam:  MS - awake, alert but perseverates and only follows simple commands.    CN - pupils equal round, EOMI, BTT b/l, difficult to assess facial asymmetry, hearing intact to conversation b/l   Motor - Normal bulk. Inc tone in L side and normal tone in R side. Spontaneous movements of L > R side. Grimaces, withdraws, and localizes to strong noxious in LUE 4+/5, LLE 4-/5, RUE 3+/5, RLE 2/5  Sens - LT/temp intact all, as above   Coord - [facility]  Gait and station - [facility]    LABORATORY:  CBC                       8.8    5.25  )-----------( 356      ( 2022 06:05 )             28.2     Chem 07-03    139  |  101  |  35<H>  ----------------------------<  178<H>  5.1   |  26  |  1.45<H>    Ca    8.6      2022 06:04  Phos  3.0     07-03  Mg     2.2     07-03    TPro  5.9<L>  /  Alb  2.4<L>  /  TBili  0.2  /  DBili  x   /  AST  18  /  ALT  11  /  AlkPhos  126<H>  -    LFTs LIVER FUNCTIONS - ( 2022 06:04 )  Alb: 2.4 g/dL / Pro: 5.9 g/dL / ALK PHOS: 126 U/L / ALT: 11 U/L / AST: 18 U/L / GGT: x           Coagulopathy PT/INR - ( 2022 06:05 )   PT: 15.3 sec;   INR: 1.33 ratio       PTT - ( 2022 06:05 )  PTT:38.0 sec  Lipid Panel   A1c   Cardiac enzymes     U/A Urinalysis Basic - ( 2022 14:32 )    Color: Light Yellow / Appearance: Clear / S.018 / pH: x  Gluc: x / Ketone: Negative  / Bili: Negative / Urobili: Negative   Blood: x / Protein: Trace / Nitrite: Negative   Leuk Esterase: Negative / RBC: 1 /hpf / WBC 2 /HPF   Sq Epi: x / Non Sq Epi: 1 /hpf / Bacteria: Negative    CSF  Immunological  Other    STUDIES & IMAGING: (EEG, CT, MR, U/S, TTE/DINAH):    < from: MR Head w/wo IV Cont (22 @ 15:23) >    ACC: [id number] EXAM:  MR BRAIN WAW IC                          PROCEDURE DATE:  2022      INTERPRETATION:  CLINICAL INDICATION: Altered mental status, seizures    Magnetic resonance imaging of the brain was carried out with transaxial   SPGR, FLAIR, fast spin echo T2 weighted images, axial susceptibility   weighted series, diffusion weighted series and sagittal T1 weighted   series on a 1.5 Anisa magnet. Post contrast axial, coronal and sagittal   T1 weighted images were obtained. 6 cc of Gadavist were intravenously   injected, 1.5 cc were discarded.    Comparison is made with the prior brain CT of 2022.    Mild to moderate atrophy is identified with ventricular and sulcal   prominence. Extensive small vessel white matter ischemic changes are   noted. There is extensive bifrontal parietal encephalomalacia and gliosis   involving the centrum semiovale ovale in the border zone regions. These   do not demonstrate diffusion restriction and demonstrate bright signal   intensity on T1-weighted images which does not lymphoma on susceptibility   weighted series suggesting laminar necrosis rather than old hemorrhage.   After contrast administration there is mild gyral enhancement in a small   portion of the frontal cortex. There is no evidence of cerebritis or   abscess.    IMPRESSION: Extensive bilateral frontal parietal gliosis and   encephalomalacia with minimal enhancement which may be related to   ischemic changes versus changes of hypertensive encephalopathy in a   patient with renal transplant on immunosuppressants. No evidence of   cerebritis or abscess.    --- End of Report ---    TIMOTHY GOMES MD; Attending Radiologist  This document has been electronically signed. 2022  4:03PM    < end of copied text >      vEEG  -  EEG SUMMARY/CLASSIFICATION    Abnormal EEG in an altered / a sedated patient.  - Rare right frontal and left frontal sharp waves  - Patient events as described above  - Left frontotemporal slowing  - Mild to moderate generalized slowing    _____________________________________________________________  EEG IMPRESSION/CLINICAL CORRELATE    Abnormal EEG study.  Event of left leg shaking was not epileptic.  Potential epileptogenic foci in the right frontal and left frontal regions.  Structural or functional abnormality in the left frontotemporal region.  Mild to moderate nonspecific diffuse or multifocal cerebral dysfunction.   No seizure seen.      MRI brain w/ and w/o  - Extensive bilateral frontal parietal gliosis and encephalomalacia with minimal enhancement which may be related to ischemic changes versus changes of hypertensive encephalopathy in a patient with renal transplant on immunosuppressants. No evidence of cerebritis or abscess.      < from: CT Head No Cont (22 @ 10:29) >    ACC: [id number] EXAM:  CT BRAIN                          PROCEDURE DATE:  2022      INTERPRETATION:  .    CLINICAL INFORMATION: Worsening lethargy. Altered mental status.    TECHNIQUE: Multiple axial CT images of the head were obtained without   contrast. Sagittal and coronal reconstructed images were acquired from   the source data.    COMPARISON: Prior head CT study from 2022. Prior brain MRI study   from 2022.    FINDINGS: Extensive encephalomalacia and gliosis are again noted within   the high bilateral cerebral hemispheres.. Overall appearance is unchanged.    There is no acute intracranial hemorrhage, mass effect, shift of the   midline structures, herniation, hydrocephalus, abnormal extra-axial fluid   collection.    There is diffuse cerebral volume loss with prominence of the sulci,   fissures, and cisternal spaces which is normal for the patient's age.   There is moderate patchy confluent periventricular white matter   hypoattenuation statistically compatible with microvascular changes given   calcific atherosclerotic disease of the intracranial arteries.    The paranasal sinuses and mastoid air cells are clear. The calvarium   appears intact. There is evidence of bilateral cataract removal.    IMPRESSION:No acute intracranial hemorrhage.    Similar-appearing extensive encephalomalacia and gliosis within the   bilateral high cerebral hemispheres.    Similar-appearing moderate severity chronic white matter microvascular   type changes.

## 2022-07-03 NOTE — PROGRESS NOTE ADULT - NS ATTEND AMEND GEN_ALL_CORE FT
Patient seen and examined with transplant team on AM SICU rounds  Mildly confused, intermittently mildly agitated  Antiseizure meds as per neurology  Hemodynamically stable  Oxygenation ok  On goal tube feeds  Anticoagulation fully off today (Eliquis has been off x 48 hours) - will start full dose lovenox for a.fib while awaiting PEG placement next week

## 2022-07-03 NOTE — PROGRESS NOTE ADULT - TIME BILLING
Kidney Transplant recipient with functioning allograft  Cerebrovascular disease, HTN, DM, Controlled seizures, Delirium  Anemia  Urinary retention  Comorbidities reviewed.  Patient seen, examined and reviewed available clinical and lab data    Reviewed immunosuppression and allograft function    Reviewed medication regimen for glycemic control and blood pressure control and adjustments in antihypertensive regimen  Suggestions:  Continue current immunosuppression  Neurology follow up  Physical therapy, supportive care.   Will follow  I was present during and reviewed clinical and lab data as well as assessment and plan as documented  . Please contact if any additional questions with any change in clinical condition or on availability of any additional information or reports.

## 2022-07-04 NOTE — PROGRESS NOTE ADULT - TIME BILLING
Kidney Transplant recipient with functioning allograft  Cerebrovascular disease, HTN, DM, Controlled seizures, Delirium  Vomiting,   Noted feed is on hold  Anemia  Urinary retention  Abnormal electrolytes, hyperkalemia  Comorbidities reviewed.  Patient seen, examined and reviewed available clinical and lab data    Reviewed immunosuppression and allograft function    Reviewed medication regimen for glycemic control and blood pressure control and adjustments in antihypertensive regimen  Suggestions:  Continue current immunosuppression- Patient needs Belatacept 10 mg/Kg when available and if not available may start on M-TOR inhibitor as an alternative. Continue MMF and steroids  Miranda catheter  IV Saline 500 ml and Furosemide 40 mg IV in view of hyperkalemia after Miranda placement and continue on 50 ml/h until tube feeding is restarted with low K feed  Monitor electrolytes including K, Mg  Neurology follow up noted.   Physical therapy, supportive care.   Will follow  I was present during and reviewed clinical and lab data as well as assessment and plan as documented  . Please contact if any additional questions with any change in clinical condition or on availability of any additional information or reports.

## 2022-07-04 NOTE — PROGRESS NOTE ADULT - ATTENDING COMMENTS
Pt seen and examined with SICU team on 7/4, agree with above    1. Altered mental status and agitation, recent status epilepticus, seizure disorder:  - Home meds: divalproex 750 qam/ 500 qpm, Keppra 250mg bid  - No further seizures  - Currently on fosphenytoin and perampanel  - F/u Neuro recs    - Given AMS, pt has had feeding tube placed. Will need to discuss PEG with family, but as pt remains agitated and pulls at lines/tubes, PEG would likely be relatively contraindicated until behavior is better given risk of pulling PEG in the early postop period and possibility of needing emergent surgery.  - Vomited overnight, tube feeds were held. Plan to check abdominal xray and if ok, restart tube feeds.    2. ESRD s/p renal transplant 4/21:  - Home meds: cellcept 500mg bid, envarsus XR 3mg daily, prednisone 5mg daily, fluconazole 200mg daily, bactrim 400mg/80mg daily, valcyte 450mg three times per week  - Hyperkalemia with slight rise in creatinine - per Tx Nephro, 500cc IVF bolus and lasix given, with lokelma and hyperkalemia "cocktail." Recheck BMP.    3. Essential HTN:  - Home meds: norvasc 10mg daily, coreg 6.25mg bid  - Continue norvasc  - Coreg being held for bradycardia during this admission  - Hydralazine added for better BP control    4. Paroxysmal atrial fibrillation with h/o CVA:  - Home meds: Eliquis 2.5mg bid, coreg 6.25mg bid  - Currently on therapeutic lovenox for now - Eliquis being held for possible PEG, will need to discuss with patient's family    5. HLD:  - Home meds: atorvastatin 40mg  - Continue    6. DM type 2 on long-term insulin, with highly variable fsg during this admission:  - Home meds: Humalog 5 units qac, glargine 6units qhs  - HbA1c 6%  - Monitor fsg for fluctuations, especially when having to hold tube feeds.     7. GERD:  - Home meds: protonix 40mg daily    8. Acquired hypothyroidism:  - Home meds: Synthroid 50mcg daily

## 2022-07-04 NOTE — PROGRESS NOTE ADULT - SUBJECTIVE AND OBJECTIVE BOX
[facility]   MD NINA MASON MD KRISTINE SOLTANPOUR, WALDO PALMA    TEL:  OFFICE: [phone]  From 5pm-7am Answering Service [phone]    -- RENAL FOLLOW UP NOTE ---Date of Service 07-04-22 @ 20:41    Patient is a 66y old  Male who presents with a chief complaint of Status Epilepticus (04 Jul 2022 15:45)      Patient seen and examined at bedside. No chest pain/sob    VITALS:  T(F): 97.2 (07-04-22 @ 15:00), Max: 97.3 (07-04-22 @ 11:00)  HR: 51 (07-04-22 @ 19:00)  BP: 154/67 (07-04-22 @ 19:00)  RR: 12 (07-04-22 @ 19:00)  SpO2: 91% (07-04-22 @ 19:00)  Wt(kg): --    07-03 @ 07:01  -  07-04 @ 07:00  --------------------------------------------------------  IN: 1580 mL / OUT: 1300 mL / NET: 280 mL    07-04 @ 07:01  -  07-04 @ 20:41  --------------------------------------------------------  IN: 1300 mL / OUT: 1025 mL / NET: 275 mL          PHYSICAL EXAM:  General: NAD  Neck: No JVD  Respiratory: CTAB, no wheezes, rales or rhonchi  Cardiovascular: S1, S2, RRR  Gastrointestinal: BS+, soft, NT/ND  Extremities: No peripheral edema    Hospital Medications:   MEDICATIONS  (STANDING):  ALBUTerol    0.083% 2.5 milliGRAM(s) Nebulizer every 15 minutes  amLODIPine   Tablet 10 milliGRAM(s) Oral <User Schedule>  atorvastatin 40 milliGRAM(s) Oral at bedtime  chlorhexidine 2% Cloths 1 Application(s) Topical <User Schedule>  dextrose 5% + sodium chloride 0.9%. 1000 milliLiter(s) (50 mL/Hr) IV Continuous <Continuous>  doxazosin 4 milliGRAM(s) Oral daily  enoxaparin Injectable 60 milliGRAM(s) SubCutaneous every 12 hours  epoetin cortney-epbx (RETACRIT) Injectable 43809 Unit(s) SubCutaneous every 7 days  fluconAZOLE IVPB 200 milliGRAM(s) IV Intermittent every 24 hours  fosphenytoin IVPB 100 milliGRAM(s) PE IV Intermittent <User Schedule>  fosphenytoin IVPB 75 milliGRAM(s) PE IV Intermittent <User Schedule>  hydrALAZINE 100 milliGRAM(s) Oral every 8 hours  insulin lispro (ADMELOG) corrective regimen sliding scale   SubCutaneous every 8 hours  lactulose Syrup 10 Gram(s) Oral three times a day  levothyroxine Injectable 40 MICROGram(s) IV Push at bedtime  mycophenolate mofetil Suspension 500 milliGRAM(s) Oral every 12 hours  pantoprazole   Suspension 40 milliGRAM(s) Oral daily  perampanel 4 milliGRAM(s) Oral at bedtime  polyethylene glycol 3350 17 Gram(s) Oral two times a day  predniSONE  Solution 5 milliGRAM(s) Oral daily  senna Syrup 10 milliLiter(s) Oral daily  sodium zirconium cyclosilicate 10 Gram(s) Oral every 8 hours  valGANciclovir 50 mG/mL Oral Solution 450 milliGRAM(s) Oral daily      LABS:  07-04    137  |  100  |  33<H>  ----------------------------<  149<H>  6.2<HH>   |  25  |  1.50<H>    Ca    9.0      04 Jul 2022 19:45  Phos  3.9     07-04  Mg     2.0     07-04    TPro  6.3  /  Alb  2.7<L>  /  TBili  0.2  /  DBili      /  AST  21  /  ALT  13  /  AlkPhos  149<H>  07-04    Creatinine Trend: 1.50 <--, 1.51 <--, 1.49 <--, 1.49 <--, 1.45 <--, 1.45 <--, 1.48 <--, 1.42 <--, 1.50 <--, 1.47 <--, 1.52 <--, 1.44 <--, 1.34 <--, 1.32 <--, 1.31 <--, 1.33 <--, 1.51 <--    Phosphorus Level, Serum: 3.9 mg/dL (07-04 @ 19:45)  Phosphorus Level, Serum: 3.2 mg/dL (07-04 @ 06:13)  Albumin, Serum: 2.7 g/dL (07-04 @ 06:13)                              8.9    8.02  )-----------( 330      ( 04 Jul 2022 06:14 )             28.4     Urine Studies:  Urinalysis - [07-03-22 @ 14:32]      Color Light Yellow / Appearance Clear / SG 1.018 / pH 8.0      Gluc Negative / Ketone Negative  / Bili Negative / Urobili Negative       Blood Negative / Protein Trace / Leuk Est Negative / Nitrite Negative      RBC 1 / WBC 2 / Hyaline 0 / Gran  / Sq Epi  / Non Sq Epi 1 / Bacteria Negative      Iron 17, TIBC 171, %sat 10      [05-02-22 @ 13:03]  Ferritin 1505      [05-02-22 @ 13:05]  PTH -- (Ca 9.2)      [02-05-22 @ 10:13]   294  HbA1c 6.0      [02-21-20 @ 08:53]      Syphilis Screen (Treponema Pallidum Ab) Negative      [06-27-22 @ 02:00]    RADIOLOGY & ADDITIONAL STUDIES:

## 2022-07-04 NOTE — PROGRESS NOTE ADULT - ASSESSMENT
65 y/o male w/ a PMHx of CAD s/p CABG, AF on Eliquis c/b CVA c/b seizures, HTN, HLD, DM type II, hypothyroidism, GI bleed due to a duodenal ulcer, and ESRD s/p DDRT c/b DGF requiring HD & large perinephric hematoma s/p evacuation w/ revision of arterial anastomosis who presented in status epilepticus requiring intubation for airway protection with a hospital course c/b large right pleural effusion s/p thoracentesis c/b hemothorax while being anticoagulated & requiring intubation for airway protection s/p chest tube placement w/ CT demonstrating retained hemothorax s/p right VATS, status epilepticus again causing AMS requiring intubation for airway protection again, delirium, dysphagia requiring NGT placement for enteral access, hyperkalemia, and poor glycemic control    PLAN:    Neuro: 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 c/b status epilepticus, delirium  - Assess neurological status every 4 hours in the setting of seizures  - Fosphenytoin w/ daily levels & perampanel for seizures  - Appreciate neurology recommendations  - Pain control if needed w/ acetaminophen    Resp: intubated for airway protection x3 (resolved), large right pleural effusion s/p thoracentesis c/b hemothorax while being anticoagulated s/p chest tube placement c/b retained hemothorax s/p right VATS  - Out of bed to chair, incentive spirometry, and aggressive chest CT to prevent atelectasis    CV: CAD, AF, HTN  - Amlodipine and hydralazine for HTN    GI: dysphagia  - NPO except medications due to episode of vomiting that may be secondary to constipation  - Bowel regimen with senna & Miralax; should consider adding lactulose and manual disimpaction  - Patient's family appear to be agreeable to PEG  - Protonix for stress ulcer prophylaxis    Renal: s/p DDRT, urinary retention, hyperkalemia (resolved)  - Immunosuppression as per transplant surgery (currently on MMF & steroids)  - Doxazosin for urinary retention    Heme: anemia of chronic diseases  - Monitor CBC and coags  - On therapeutic Lovenox instead of Eliquis for possible PEG  - Epogen for anemia of chronic diseases  - Free water 200 mL q12hrs as per nephrology    ID: UTI, immunosuppression  - Monitor WBC, temperature, and procalcitonin  - Empiric antibiotics w/ ertapenem for UTI as per ID  - Fluconazole and Valcyte for immunosuppression prophylaxis  - Holding Bactrim for hyperkalemia    Endo: DM type II, HLD, hypothyroidism  - Monitor glucose w/ ISS q6hrs for glycemic control; HgbA1C 6.6% on 4/24  - Atorvastatin for HLD  - Synthroid for hypothyroidism    Code Status: Full code    Rebecca Yepez PA-C     [id number] 67 y/o male w/ a PMHx of CAD s/p CABG, AF on Eliquis c/b CVA c/b seizures, HTN, HLD, DM type II, hypothyroidism, GI bleed due to a duodenal ulcer, and ESRD s/p DDRT c/b DGF requiring HD & large perinephric hematoma s/p evacuation w/ revision of arterial anastomosis who presented in status epilepticus requiring intubation for airway protection with a hospital course c/b large right pleural effusion s/p thoracentesis c/b hemothorax while being anticoagulated & requiring intubation for airway protection s/p chest tube placement w/ CT demonstrating retained hemothorax s/p right VATS, status epilepticus again causing AMS requiring intubation for airway protection again, delirium, dysphagia requiring NGT placement for enteral access, hyperkalemia, and poor glycemic control    PLAN:    Neuro: 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 c/b status epilepticus, delirium  - Assess neurological status every 4 hours in the setting of seizures  - Fosphenytoin w/ daily levels & perampanel for seizures  - Appreciate neurology recommendations  - Pain control if needed w/ acetaminophen    Resp: intubated for airway protection x3 (resolved), large right pleural effusion s/p thoracentesis c/b hemothorax while being anticoagulated s/p chest tube placement c/b retained hemothorax s/p right VATS  - Out of bed to chair, incentive spirometry, and aggressive chest CT to prevent atelectasis    CV: CAD, AF, HTN  - Amlodipine and hydralazine for HTN    GI: dysphagia  - NPO except medications due to episode of vomiting that may be secondary to constipation  - Bowel regimen with senna & Miralax; should consider adding lactulose and manual disimpaction  - Patient's family appears to be agreeable to PEG but given patient's agitation, high risk for inadvertent removal so will need to reassess w/ family  - Protonix for stress ulcer prophylaxis    Renal: s/p DDRT, urinary retention, hyperkalemia (resolved)  - Immunosuppression as per transplant surgery (currently on MMF & steroids)  - Doxazosin for urinary retention    Heme: anemia of chronic diseases  - Monitor CBC and coags  - On therapeutic Lovenox instead of Eliquis for possible PEG  - Epogen for anemia of chronic diseases  - Free water 200 mL q12hrs as per nephrology    ID: UTI, immunosuppression  - Monitor WBC, temperature, and procalcitonin  - Empiric antibiotics w/ ertapenem for UTI as per ID  - Fluconazole and Valcyte for immunosuppression prophylaxis  - Holding Bactrim for hyperkalemia    Endo: DM type II, HLD, hypothyroidism  - Monitor glucose w/ ISS q6hrs for glycemic control; HgbA1C 6.6% on 4/24  - Atorvastatin for HLD  - Synthroid for hypothyroidism    Code Status: Full code    Rebecca Yepez PA-C     [id number]

## 2022-07-04 NOTE — PROGRESS NOTE ADULT - SUBJECTIVE AND OBJECTIVE BOX
HISTORY:  65 y/o male w/ a PMHx of CAD s/p CABG (2020), AF on Eliquis c/b CVA (2019) c/b seizures, HTN, HLD, DM type II, hypothyroidism, GI bleed due to a duodenal ulcer (2/2022), and ESRD s/p DDRT (4/21/22) c/b DGF requiring HD (last session 4/29/22) & large perinephric hematoma s/p evacuation w/ revision of arterial anastomosis (5/2/22) who presented on 6/10/22 for status epilepticus. Patient was intubated for airway protection. AEDs were adjusted and he was successfully extubated on 6/11. Patient was noted to have a large right pleural effusion so thoracentesis was performed w/ 1.3 L of serosanguineous fluid drained. Patient was started on a heparin infusion for his atrial fibrillation post-procedure but had a H&H drop w/ CXR demonstrating complete right lung whiteout concerning for a hemothorax. Patient was transferred to SICU for further management. He was intubated for airway protection again, transfused several products, and reversed w/ protamine. A right 28 Fr chest tube was placed w/ a significant amount of sanguineous fluid drained. He was noted to have a significant amount of residual hemothorax on CT on 6/12 so he eventually was taken to the OR on 6/15 for a right VATS for hematoma washout and placement of a second chest tube. He was transferred to the floors on 6/17. Both chest tubes were removed on 6/18. He went into status epilepticus again on 6/25 so he was readmitted to SICU. He was intubated for airway protection again on 6/26 and successfully extubated on 6/28. Hospital course has also been significant for delirium, dysphagia requiring NGT placement for enteral access, hyperkalemia, and poor glycemic control. Patient currently denies headache, fevers, chills, dizziness, weakness, shortness of breath, chest pain, abdominal pain, or nausea/vomiting.    24 HOUR EVENTS:  - Discontinued hydralazine IVP PRN for HTN  - Started therapeutic Lovenox for anticoagulation for atrial fibrillation  - Persistent urinary retention requiring straight catheterization so UA sent, which was negative for UTI  - Given a Dulcolax suppository but still no BM  - Vomited overnight so held enteral nutrition and d/darren NPH 2 units q8hrs  - Glucose 77 so given several oz of apple juice    NEURO  Exam: awake, alert, oriented x2-3, restless & combative at times, moving all four extremities  Meds:  - acetaminophen    Suspension .. 650 milliGRAM(s) Enteral Tube every 6 hours PRN Mild Pain (1 - 3)  - fosphenytoin IVPB 100 milliGRAM(s) PE IV Intermittent every 8 hours  - perampanel 4 milliGRAM(s) Oral at bedtime    RESPIRATORY  RR: 15 (07-04-22 @ 06:00) (5 - 28)  SpO2: 95% (07-04-22 @ 06:00) (95% - 100%)  Exam: mild rhonchi in all lung fields    CARDIOVASCULAR  HR: 61 (07-04-22 @ 06:00) (48 - 65)  BP: 162/74 (07-04-22 @ 06:00) (113/57 - 162/74)  BP(mean): 107 (07-04-22 @ 06:00) (82 - 109)  Exam: irregularly irregular  Cardiac Rhythm: atrial fibrillation  Perfusion    [x]Adequate    [ ]Inadequate  Mentation   [x]Normal       [ ]Reduced  Extremities  [x]Warm         [ ]Cool  Volume Status [ ]Hypervolemic [x]Euvolemic [ ]Hypovolemic  Meds:  - amLODIPine   Tablet 10 milliGRAM(s) Oral daily  - hydrALAZINE 100 milliGRAM(s) Oral every 8 hours    GI/NUTRITION  Exam: soft, nontender, nondistended, RLQ incision well-healed, NGT clamped  Diet: NPO except medications  Meds:  - pantoprazole   Suspension 40 milliGRAM(s) Oral daily  - polyethylene glycol 3350 17 Gram(s) Oral two times a day  - senna Syrup 10 milliLiter(s) Oral daily    GENITOURINARY  I&O's Detail    07-03 @ 07:01  -  07-04 @ 07:00  --------------------------------------------------------  IN:    Enteral Tube Flush: 330 mL    Free Water: 200 mL    Glucerna 1.5: 950 mL    IV PiggyBack: 100 mL  Total IN: 1580 mL    OUT:    Intermittent Catheterization - Urethral (mL): 1300 mL  Total OUT: 1300 mL    Total NET: 280 mL    137  |  101  |  37<H>  ----------------------------<  78  5.8<H>   |  26  |  1.49<H>    Ca    8.8      04 Jul 2022 06:13  Phos  3.2  Mg     2.1  TPro  6.3  /  Alb  2.7<L>  /  TBili  0.2  /  DBili  x   /  AST  21  /  ALT  13  /  AlkPhos  149<H>    Meds:  - doxazosin 4 milliGRAM(s) Oral daily  - mycophenolate mofetil Suspension 500 milliGRAM(s) Oral every 12 hours  - predniSONE  Solution 5 milliGRAM(s) Oral daily    HEMATOLOGIC  Meds:  - enoxaparin Injectable 60 milliGRAM(s) SubCutaneous every 12 hours  - epoetin cortney-epbx (RETACRIT) Injectable 22646 Unit(s) SubCutaneous every 7 days                        8.9    8.02  )-----------( 330      ( 04 Jul 2022 06:14 )             28.4     PT/INR - ( 04 Jul 2022 06:14 )   PT: 15.3 sec;   INR: 1.32 ratio    PTT - ( 04 Jul 2022 06:14 )  PTT:40.0 sec    INFECTIOUS DISEASES  T(C): 36 (07-04-22 @ 03:00), Max: 36.6 (07-03-22 @ 11:00)  WBC Count: 8.02 K/uL (07-04 @ 06:14)    Recent Cultures:  - Specimen Source: .CSF CSF, 06-29 @ 13:09  Results: Culture is being performed.  Gram Stain: No polymorphonuclear leukocytes seen. No organisms seen by cytocentrifuge  Organism: --  - Specimen Source: .Other Other, 06-28 @ 05:06  Results: No Nocardia isolated  Gram Stain: --  Organism: --  - Specimen Source: .Blood Blood-Peripheral, 06-27 @ 23:27  Results: No Growth Final  Gram Stain: --  Organism: --    Meds:   - fluconAZOLE IVPB 200 milliGRAM(s) IV Intermittent every 24 hours  - valGANciclovir 50 mG/mL Oral Solution 450 milliGRAM(s) Oral daily    ENDOCRINE  Capillary Blood Glucose:  - 91 mg/dL (04 Jul 2022 06:54)  - 83 mg/dL (04 Jul 2022 06:36)  - 77 mg/dL (04 Jul 2022 06:05)  - 220 mg/dL (03 Jul 2022 21:25)  - 197 mg/dL (03 Jul 2022 14:06)  Meds:  - atorvastatin 40 milliGRAM(s) Oral at bedtime  - insulin lispro (ADMELOG) corrective regimen sliding scale   SubCutaneous every 8 hours  - levothyroxine Injectable 40 MICROGram(s) IV Push at bedtime    ACCESS DEVICES:  [x] Peripheral IV  [ ] Central Venous Line		[ ] R	[ ] L	[ ] IJ	[ ] Fem	[ ] SC	Placed:   [ ] Arterial Line			[ ] R	[ ] L	[ ] Fem	[ ] Rad	[ ] Ax	Placed:   [ ] PICC:					[ ] Mediport  [ ] Urinary Catheter, Date Placed:   [x] Necessity of urinary, arterial, and venous catheters discussed    OTHER MEDICATIONS: chlorhexidine 2% Cloths 1 Application(s) Topical <User Schedule>    IMAGING:

## 2022-07-04 NOTE — PROVIDER CONTACT NOTE (OTHER) - ASSESSMENT
Tube feeds were being held for 10 min prior to repositioning and are still being held at this time. HOB raised and patient suctioned. Pt Saturating 96% does not appear to be in respiratory distress, able to speak and answers questions.

## 2022-07-04 NOTE — PROGRESS NOTE ADULT - SUBJECTIVE AND OBJECTIVE BOX
--------------------------------------------------------------------------------  Chief Complaint:  Disoriented  24 hour events/subjective:    Reviewed    PAST HISTORY  --------------------------------------------------------------------------------  No significant changes to PMH, PSH, FHx, SHx, unless otherwise noted    ALLERGIES & MEDICATIONS  --------------------------------------------------------------------------------  Allergies    No Known Allergies    Intolerances      Standing Inpatient Medications  amLODIPine   Tablet 10 milliGRAM(s) Oral <User Schedule>  atorvastatin 40 milliGRAM(s) Oral at bedtime  chlorhexidine 2% Cloths 1 Application(s) Topical <User Schedule>  dextrose 5% + sodium chloride 0.9%. 1000 milliLiter(s) IV Continuous <Continuous>  doxazosin 4 milliGRAM(s) Oral daily  enoxaparin Injectable 60 milliGRAM(s) SubCutaneous every 12 hours  epoetin cortney-epbx (RETACRIT) Injectable 29902 Unit(s) SubCutaneous every 7 days  fluconAZOLE IVPB 200 milliGRAM(s) IV Intermittent every 24 hours  fosphenytoin IVPB 100 milliGRAM(s) PE IV Intermittent every 8 hours  hydrALAZINE 100 milliGRAM(s) Oral every 8 hours  insulin lispro (ADMELOG) corrective regimen sliding scale   SubCutaneous every 8 hours  lactulose Syrup 10 Gram(s) Oral two times a day  levothyroxine Injectable 40 MICROGram(s) IV Push at bedtime  mycophenolate mofetil Suspension 500 milliGRAM(s) Oral every 12 hours  pantoprazole   Suspension 40 milliGRAM(s) Oral daily  perampanel 4 milliGRAM(s) Oral at bedtime  polyethylene glycol 3350 17 Gram(s) Oral two times a day  predniSONE  Solution 5 milliGRAM(s) Oral daily  senna Syrup 10 milliLiter(s) Oral daily  sodium zirconium cyclosilicate 10 Gram(s) Oral every 8 hours  valGANciclovir 50 mG/mL Oral Solution 450 milliGRAM(s) Oral daily    PRN Inpatient Medications  acetaminophen    Suspension .. 650 milliGRAM(s) Enteral Tube every 6 hours PRN      REVIEW OF SYSTEMS  --------------------------------------------------------------------------------  Gen: No fevers/chills   Respiratory: No dyspnea, cough,   CV: No chest pain, PND, orthopnea  GI:  vomiting  : Urinary retention  Neuro: Delirium  Psych: No significant nervousness, anxiety, stress, depression  VITALS/PHYSICAL EXAM  --------------------------------------------------------------------------------  T(C): 36.3 (07-04-22 @ 11:00), Max: 36.5 (07-03-22 @ 19:00)  HR: 56 (07-04-22 @ 11:00) (49 - 65)  BP: 131/63 (07-04-22 @ 11:00) (113/57 - 162/74)  RR: 16 (07-04-22 @ 11:00) (5 - 28)  SpO2: 100% (07-04-22 @ 11:00) (95% - 100%)    07-03-22 @ 07:01  -  07-04-22 @ 07:00  --------------------------------------------------------  IN: 1580 mL / OUT: 1300 mL / NET: 280 mL    07-04-22 @ 07:01  -  07-04-22 @ 11:44  --------------------------------------------------------  IN: 150 mL / OUT: 0 mL / NET: 150 mL      Physical Exam:  	Gen: Disoriented  	HEENT: NG tube noted  	Pulm: CTA B/L  	CV:  no rub  	Abd: +BS, soft, nontender/nondistended                      Transplant: No tenderness   	: No suprapubic tenderness  	UE:  no asterixis  	LE:  no edema  	Neuro: Responds to simple requests like 'open your mouth'. Disoriented  	Psych: Delirium  	Skin: Warm, without rashes      LABS/STUDIES  --------------------------------------------------------------------------------              8.9    8.02  >-----------<  330      [07-04-22 @ 06:14]              28.4     138  |  102  |  34  ----------------------------<  100      [07-04-22 @ 09:34]  6.5   |  27  |  1.49        Ca     8.8     [07-04-22 @ 09:34]      Mg     2.1     [07-04-22 @ 06:13]      Phos  3.2     [07-04-22 @ 06:13]    TPro  6.3  /  Alb  2.7  /  TBili  0.2  /  DBili  x   /  AST  21  /  ALT  13  /  AlkPhos  149  [07-04-22 @ 06:13]    PT/INR: PT 15.3 , INR 1.32       [07-04-22 @ 06:14]  PTT: 40.0       [07-04-22 @ 06:14]      Creatinine Trend:  SCr 1.49 [07-04 @ 09:34]  SCr 1.49 [07-04 @ 06:13]  SCr 1.45 [07-03 @ 06:04]  SCr 1.45 [07-03 @ 00:23]  SCr 1.48 [07-02 @ 03:23]    Urinalysis - [07-03-22 @ 14:32]      Color Light Yellow / Appearance Clear / SG 1.018 / pH 8.0      Gluc Negative / Ketone Negative  / Bili Negative / Urobili Negative       Blood Negative / Protein Trace / Leuk Est Negative / Nitrite Negative      RBC 1 / WBC 2 / Hyaline 0 / Gran  / Sq Epi  / Non Sq Epi 1 / Bacteria Negative      Iron 17, TIBC 171, %sat 10      [05-02-22 @ 13:03]  Ferritin 1505      [05-02-22 @ 13:05]  PTH -- (Ca 9.2)      [02-05-22 @ 10:13]   294  HbA1c 6.0      [02-21-20 @ 08:53]      Syphilis Screen (Treponema Pallidum Ab) Negative      [06-27-22 @ 02:00]

## 2022-07-04 NOTE — PROGRESS NOTE ADULT - NUTRITIONAL ASSESSMENT
This patient has been assessed with a concern for Malnutrition and has been determined to have a diagnosis/diagnoses of Severe protein-calorie malnutrition.    This patient is being managed with:   Diet NPO-  Except Medications  Entered: Jul 4 2022  6:59AM

## 2022-07-04 NOTE — PROGRESS NOTE ADULT - ASSESSMENT
66 yr old Male with PMHx ESRD s/p permacath removal 5/10/22 s/p Rt DDRT 4/21/22,  CVA (2019), Afib on apixaban, seizure activity, CAD s/p stents, CABG (2020), HTN, HLD, DM on insulin, gastric/duodenal ulcer, recent hospitalization 4/28/22 -5/10/22 with weakness/anemia found to have perinephric hematoma requiring evacuation and repair of bleeding arterial anastomosis. Curently being treated for UTI with Levaquin Today after developing multiple sz episodes refractory to 5 mg versed IM (EMS) and 2 mg ativan IV in E.D. concerning for status epilepticus requiring intubation for hypoxic respiratory  failure. MICU Consult was called for hypoxic respiratory failure secondary to status epilepticus.  Nephrology consulted for renal failure.     A/P  DDRT on 04/21/22  Course complicated by DGF, was on HD until 4/29/22. Readmitted in May for anemia in the setting of large perinephric hematoma s/p evacuation and repair of arterial anastomosis on 5/2/22. Intra operative biopsy showed no rejection.  ANA likely sec to  hemodynamic change   scr fluctuating   optimize glucose   monitor BMP, U/O     Hyperkalemia   patient is on bactrim   Given one dose of lokelma   Please start Lokelma 10 mg po daily.    monitor K closely     HTN   Suboptimal  Suggest to switch amlodipine to nifedipine XL 60 mg po daily.   monitor BP closely     Immunosuppression  continue per transplant team   Induction:  Thymoglobulin                                        Maintenance:  Belatacept 1st dose 6/23/2022  Belatacept 2nd dose  7/4/2022  MMF restarted at 500 mg po BID 7/1/2022  Prednisone 5 mg po daily.  Some IS prior could lower the seizure threshold defer to Neurology and Transplant team.

## 2022-07-04 NOTE — CHART NOTE - NSCHARTNOTEFT_GEN_A_CORE
Case was discussed with Epilepsy team  Will need to place patient on vEEG.   Continue current AED management now.   Will decide on AED changes pending EEG results.   Case was discussed with Epilepsy Attending, Dr. Allred.     Tom Jain MD PGY3   [id number]

## 2022-07-04 NOTE — PROGRESS NOTE ADULT - SUBJECTIVE AND OBJECTIVE BOX
Transplant Surgery Multidisciplinary Progress Note   --------------------------------------------------------------  R DCD DDRT    22    Present: Patient seen and examined with multidisciplinary team including Transplant Surgeon: Dr. David,  Nephrologist: Dr. Alfred, NP Altagracia Montelongo and SICU team during am rounds.  Disciplines not in attendance will be notified of the plan.     HPI: 66M with PMH: DM type II, HTN, CAD s/p CABG in , AFib on Eliquis, CVA in  due to Afib, Seizure d/o following CVA, last episode was on 22, h/o GIB in 2022 EGD with duodenal ulcer.  ESRD due to DM was on HD since .     s/p R DCD DDRT on 22.  Donor was 58 , KDPI 82%, DCD, single vessels and ureter, HLA mismatch 1, 2, 2. No DSA, cPRA 0%. CMV +/+  Course complicated by DGF, was on HD until 22. Re-admitted in May for anemia in the setting of large perinephric hematoma s/p evacuation and repair of arterial anastomosis on 22. Intra operative biopsy showed no rejection, Creatinine ranging ~2mg/dL.    In Rehab: He was recently dx with klebsiella UTI rx with ertapenem - then switched to Levaquin day #6.    He also had parainfluenza pneumonia. Was at rehab progressing well.      Hospital course:  - 6/10: transferred from rehab facility for seizure status epilepticus. Intubated for respiratory protection and transferred to MICU.  EEG showed no seizure activity. Neuro consulted.   - : Extubated. Found to have R pleural effusion. s/p Thoracentesis 1.3L. Eliquis changed to heparin drip.  Post procedure drop in H&H. 5u PRBC, 2 u FFP.    -  evening: Transferred to SICU for further care in setting of hypoxia, significant R pleural effusion, hgb 6 and hemodynamic instability  -  Intubated at 9pm and sedated on Precedex.  CT placed, 600ml blood drained.  1L total overnight, started pressors  -  Received Protamine for PTT >100 (was on Heparin drip started for AF), 2 u FFP and 5u PRBC total  -  s/p VATS 2.2L old blood removed; second chest tube placed  - -: chest tubes removed  - : ?PRES on MRI, off Envarsus, switched to Belatacept   - : Tx to SICU for refractory seizures    Interval Events:  -Afebrile, VSS  - Alert to name, intermittently follows commands  - Straight cathed for urinary retention   - Emesis overnight   - K 5.8 s/p insulin/ d50 / lokelma      Immunosuppression:   Induction:  Thymoglobulin                                        Maintenance:  -Belatacept #1  #2 Belatacept  today   -MMF restarted at 500BID ,  continue Pred 5  -Ongoing monitoring for signs of rejection.    Potential Discharge date: pending clinical improvement  Education:  Medications  Plan of care:  See Below      MEDICATIONS  (STANDING):  amLODIPine   Tablet 10 milliGRAM(s) Oral <User Schedule>  atorvastatin 40 milliGRAM(s) Oral at bedtime  belatacept IVPB 625 milliGRAM(s) IV Intermittent once  chlorhexidine 2% Cloths 1 Application(s) Topical <User Schedule>  dextrose 5% + sodium chloride 0.9%. 1000 milliLiter(s) (50 mL/Hr) IV Continuous <Continuous>  doxazosin 4 milliGRAM(s) Oral daily  enoxaparin Injectable 60 milliGRAM(s) SubCutaneous every 12 hours  epoetin cortney-epbx (RETACRIT) Injectable 27343 Unit(s) SubCutaneous every 7 days  fluconAZOLE IVPB 200 milliGRAM(s) IV Intermittent every 24 hours  fosphenytoin IVPB 100 milliGRAM(s) PE IV Intermittent <User Schedule>  fosphenytoin IVPB 75 milliGRAM(s) PE IV Intermittent <User Schedule>  hydrALAZINE 100 milliGRAM(s) Oral every 8 hours  insulin lispro (ADMELOG) corrective regimen sliding scale   SubCutaneous every 8 hours  levothyroxine Injectable 40 MICROGram(s) IV Push at bedtime  mycophenolate mofetil Suspension 500 milliGRAM(s) Oral every 12 hours  pantoprazole   Suspension 40 milliGRAM(s) Oral daily  perampanel 4 milliGRAM(s) Oral at bedtime  polyethylene glycol 3350 17 Gram(s) Oral two times a day  predniSONE  Solution 5 milliGRAM(s) Oral daily  senna Syrup 10 milliLiter(s) Oral daily  sodium zirconium cyclosilicate 10 Gram(s) Oral every 8 hours  valGANciclovir 50 mG/mL Oral Solution 450 milliGRAM(s) Oral daily    MEDICATIONS  (PRN):  acetaminophen    Suspension .. 650 milliGRAM(s) Enteral Tube every 6 hours PRN Mild Pain (1 - 3)      PAST MEDICAL & SURGICAL HISTORY:  Hypertension      Diabetes      Dyslipidemia      CAD (Coronary Artery Disease)  with Stents in 2009 and 2019, s/p off-pump C3L on 20      Hypothyroidism      CVA (cerebral vascular accident)  19 with residual bilateral weakness      Anemia      PEG (percutaneous endoscopic gastrostomy) status  removed 2020      Intubation of airway performed without difficulty  Dec 2019  CVA c/b status epilepticus requiring intubation and PEG in 2019-      ESRD on dialysis  M/W/F      Seizures  after CVA, last seizure was last week 22      History of insertion of stent into coronary artery bypass graft   and       S/P CABG x 3  off pump C3L on 20          Vital Signs Last 24 Hrs  T(C): 36.3 (2022 11:00), Max: 36.5 (2022 19:00)  T(F): 97.3 (2022 11:00), Max: 97.7 (2022 19:00)  HR: 50 (2022 14:00) (50 - 65)  BP: 146/60 (2022 14:00) (113/57 - 162/74)  BP(mean): 87 (2022 14:00) (82 - 112)  RR: 14 (2022 14:00) (10 - 28)  SpO2: 98% (2022 14:00) (95% - 100%)    I&O's Summary    2022 07:01  -  2022 07:00  --------------------------------------------------------  IN: 1580 mL / OUT: 1300 mL / NET: 280 mL    2022 07:01  -  2022 15:54  --------------------------------------------------------  IN: 1300 mL / OUT: 400 mL / NET: 900 mL          LABS:                        8.9    8.02  )-----------( 330      ( 2022 06:14 )             28.4     07-04    137  |  101  |  34<H>  ----------------------------<  51<LL>  5.4<H>   |  26  |  1.51<H>    Ca    9.1      2022 14:11  Phos  3.2     07-  Mg     2.1     -04    TPro  6.3  /  Alb  2.7<L>  /  TBili  0.2  /  DBili  x   /  AST  21  /  ALT  13  /  AlkPhos  149<H>  07-04    PT/INR - ( 2022 06:14 )   PT: 15.3 sec;   INR: 1.32 ratio         PTT - ( 2022 06:14 )  PTT:40.0 sec  Urinalysis Basic - ( 2022 14:32 )    Color: Light Yellow / Appearance: Clear / S.018 / pH: x  Gluc: x / Ketone: Negative  / Bili: Negative / Urobili: Negative   Blood: x / Protein: Trace / Nitrite: Negative   Leuk Esterase: Negative / RBC: 1 /hpf / WBC 2 /HPF   Sq Epi: x / Non Sq Epi: 1 /hpf / Bacteria: Negative      CAPILLARY BLOOD GLUCOSE      POCT Blood Glucose.: 120 mg/dL (2022 14:50)  POCT Blood Glucose.: 91 mg/dL (2022 06:54)  POCT Blood Glucose.: 83 mg/dL (2022 06:36)  POCT Blood Glucose.: 77 mg/dL (2022 06:05)  POCT Blood Glucose.: 220 mg/dL (2022 21:25)    LIVER FUNCTIONS - ( 2022 06:13 )  Alb: 2.7 g/dL / Pro: 6.3 g/dL / ALK PHOS: 149 U/L / ALT: 13 U/L / AST: 21 U/L / GGT: x             Culture - Fungal, CSF (collected 22 @ 13:09)  Source: .CSF CSF  Preliminary Report (22:52):    Testing in progress    Culture - CSF with Gram Stain (collected 22 @ 13:09)  Source: .CSF CSF  Gram Stain (22 @ 18:00):    No polymorphonuclear leukocytes seen    No organisms seen    by cytocentrifuge  Final Report (22 @ 16:04):    No growth    Culture - Acid Fast - CSF (collected 22 @ 13:09)  Source: .CSF CSF  Preliminary Report (22 @ 15:04):    Culture is being performed.    Culture - Nocardia (collected 22 @ 05:06)  Source: .Other Other  Preliminary Report (22 @ 11:38):    No Nocardia isolated    Culture - Blood (collected 22 @ 23:27)  Source: .Blood Blood-Peripheral  Final Report (22 @ 04:00):    No Growth Final    Culture - Blood (collected 22 @ 23:27)  Source: .Blood Blood-Peripheral  Final Report (22 @ 04:00):    No Growth Final          Cultures:    Culture - Fungal, CSF (collected 22 @ 13:09)  Source: .CSF CSF  Preliminary Report (22 @ :52):    Testing in progress    Culture - CSF with Gram Stain (collected 22 @ 13:09)  Source: .CSF CSF  Gram Stain (22 @ 18:00):    No polymorphonuclear leukocytes seen    No organisms seen    by cytocentrifuge  Final Report (22 @ 16:04):    No growth    Culture - Acid Fast - CSF (collected 22 @ 13:09)  Source: .CSF CSF  Preliminary Report (22 @ 15:04):    Culture is being performed.    Culture - Nocardia (collected 22 @ 05:06)  Source: .Other Other  Preliminary Report (22 @ 11:38):    No Nocardia isolated    Culture - Blood (collected 22 @ 23:27)  Source: .Blood Blood-Peripheral  Final Report (22 @ 04:00):    No Growth Final    Culture - Blood (collected 22 @ 23:27)  Source: .Blood Blood-Peripheral  Final Report (22 @ 04:00):    No Growth Final        REVIEW OF SYSTEMS  --------------------------------------------------------------------------------  unable to assess, +delirium        PHYSICAL EXAM:  Constitutional: Well developed / well nourished  Eyes: Anicteric, PERRLA  ENMT: nc/at  Neck: supple  Respiratory: CTA   Cardiovascular: RRR  Gastrointestinal: Soft, non distended, NT, incision healed   Genitourinary: voiding intermittant straight cath   Extremities: SCD's in place and working bilaterally, LUE pitting edema  Vascular: Palpable dp pulses bilaterally  Neurological: more awake today, AAOx1, intermittently following commands, no tremors noted  Skin: no rashes, ulcerations or lesions  Musculoskeletal: Moving all extremities, global weakness  Psychiatric: calm, periods of mild agitation

## 2022-07-04 NOTE — PROGRESS NOTE ADULT - ASSESSMENT
66M with h/o DM type II, HTN, CAD s/p CABG in 2020, Afib on Eliquis, CVA in 2019 due to Afib, Seizure d/o (last episode was on 4/8/22), h/o GI bleed in 2/2022 EGD with duodenal ulcer and ESRD on HD s/p R DDRT from DCD donor on 4/21/22 complicated by DGF requiring HD until 4/29/22 now with good graft function who presented with status epilepticus in setting of UTI/antibiotics and sub-therapeutic valproic acid level     Seizure Disorder  - repeat MRI head 6/27 with less concerns for PRES, likely ischemic changes  - remains seizure free  - Antiepileptic regimen per Neurology, appreciate recs. please follow closely with team  - Neuro reconsulted today will resume EEG monitoring  - Keep Mag > 2  - infectious w/u negative so far, off ABX.  Remains on Fluc.  Transplant ID following, appreciate recs    R DCD DDRT (ureter stented) 4/21/22  - renal function stable  - Strict I/Os, replace tucker prn, can add Flomax or Proscar prn  - TFs to goal.  Plan possible PEG next week if mental status remains unchanged  - will remove stent prior to discharge  - Start IVF NS @50   - Lasix 40 IV x1     Immunosuppression:   - Belatacept #1 6/23.  #2 due on  6/30- not given  - Belatacept #2 today   - continue MMF 500mg BID  - continue Pred 5  - PPx:  continue Bactrim MWF and PPI    DM  - on Lantus/Lispro, Endocrine following     AFib:  - continue Retacrit weekly  - continue Eliquis  - rate controlled    R IJ DVT  - continue Eliquis    HTN:  - continue Norvasc/Hydralazine/Cardura    Dispo  - continue excellent SICU care

## 2022-07-05 NOTE — PROGRESS NOTE ADULT - PROBLEM SELECTOR PLAN 2
-Switched to Belatacept due to neurotoxicity (concern for PRES & seizures)  -Belatacept #1 on 6/23.   -Belatacept #2 given on 7/4  -continue MMF 500mg BID  -continue Pred 5  -PPx:  continue PPI  -Discontinued bactrim due to hyperkalemia. G6PD level okay. Will start on Atovaquone

## 2022-07-05 NOTE — EEG REPORT - NS EEG TEXT BOX
[facility]   COMPREHENSIVE EPILEPSY CENTER   REPORT OF LONG-TERM VIDEO EEG     [facility]: [address], [place] [address], Ph#: [phone]  [facility]: [address] [place] [address], Ph#: [phone]  [facility]: [address] [place] [address], Ph#: [phone]    Patient Name: CHAD DUNBAR  Age and : 66y ([birth year])  MRN #: [id number]  Location: [place] [address]  Referring Physician: Lizz Davis    Start Time/Date: 16:2022  End Time/Date: 2022   Duration: 19 hours    _____________________________________________________________  STUDY INFORMATION    EEG Recording Technique:  The patient underwent continuous Video-EEG monitoring, using Telemetry System hardware on the XLTek Digital System. EEG and video data were stored on a computer hard drive with important events saved in digital archive files. The material was reviewed by a physician (electroencephalographer / epileptologist) on a daily basis. Ramon and seizure detection algorithms were utilized and reviewed. An EEG Technician attended to the patient, and was available throughout daytime work hours.  The epilepsy center neurologist was available in person or on call 24-hours per day.    EEG Placement and Labeling of Electrodes:  The EEG was performed utilizing 20 channel referential EEG connections (coronal over temporal over parasagittal montage) using all standard 10-20 electrode placements with EKG, with additional electrodes placed in the inferior temporal region using the modified 10-10 montage electrode placements for elective admissions, or if deemed necessary. Recording was at a sampling rate of 256 samples per second per channel. Time synchronized digital video recording was done simultaneously with EEG recording. A low light infrared camera was used for low light recording.     _____________________________________________________________  HISTORY    Patient is a 66y old  Male who presents with a chief complaint of Status Epilepticus (2022 00:42)      PERTINENT MEDICATION:  MEDICATIONS  (STANDING):  amLODIPine   Tablet 10 milliGRAM(s) Oral <User Schedule>  atorvastatin 40 milliGRAM(s) Oral at bedtime  atovaquone  Suspension 1500 milliGRAM(s) Oral <User Schedule>  chlorhexidine 2% Cloths 1 Application(s) Topical <User Schedule>  doxazosin 4 milliGRAM(s) Oral daily  enoxaparin Injectable 60 milliGRAM(s) SubCutaneous every 12 hours  epoetin cortnye-epbx (RETACRIT) Injectable 22519 Unit(s) SubCutaneous every 7 days  finasteride 5 milliGRAM(s) Oral daily  fosphenytoin IVPB 100 milliGRAM(s) PE IV Intermittent every 12 hours  hydrALAZINE 100 milliGRAM(s) Oral every 8 hours  insulin lispro (ADMELOG) corrective regimen sliding scale   SubCutaneous every 6 hours  levothyroxine Injectable 40 MICROGram(s) IV Push at bedtime  melatonin 6 milliGRAM(s) Oral at bedtime  mycophenolate mofetil Suspension 1000 milliGRAM(s) Enteral Tube <User Schedule>  pantoprazole   Suspension 40 milliGRAM(s) Oral daily  polyethylene glycol 3350 17 Gram(s) Oral daily  predniSONE  Solution 5 milliGRAM(s) Oral daily  sodium zirconium cyclosilicate 10 Gram(s) Oral daily  valGANciclovir 50 mG/mL Oral Solution 450 milliGRAM(s) Oral daily    _____________________________________________________________  STUDY INTERPRETATION    Findings: The background was continuous, spontaneously variable and reactive. During brief wakefulness, the posterior dominant rhythm consisted of symmetric, well-modulated 6-7 Hz activity.     Background Slowing:  Diffuse theta and polymorphic delta slowing.    Focal Slowing:   Continuous theta/delta slowing in the left frontotemporal region.     Sleep Background:  Drowsiness was characterized by fragmentation, attenuation, and slowing of the background activity.    Sleep was characterized by the presence of vertex waves, symmetric rudimentary sleep spindles and K complexes.    Non-Epileptiform Findings:  None were present.    Interictal Epileptiform Activity:   Rare right frontal and left frontal sharp waves at times occurring in synchrony.    Events:  Patient event at 22:08 with no obvious clinical change on video had no abnormal EEG correlate  Patient event at 03:54 patient with asynchronous movements of left leg, no abnormal EEG correlate.  Seizures: None recorded.    Activation Procedures:   Hyperventilation was not performed.    Photic stimulation was not performed.     Artifacts:  Intermittent myogenic and movement artifacts were noted.    ECG:  The heart rate on single channel ECG was predominantly between 60-70 bpm.    _____________________________________________________________  EEG SUMMARY/CLASSIFICATION    Abnormal EEG in an altered / a sedated patient.  - Rare right frontal and left frontal sharp waves  - Patient events as described above  - Left frontotemporal slowing  - Mild to moderate generalized slowing    _____________________________________________________________  EEG IMPRESSION/CLINICAL CORRELATE    Abnormal EEG study.  Event of left leg shaking was not epileptic.  Potential epileptogenic foci in the right frontal and left frontal regions.  Structural or functional abnormality in the left frontotemporal region.  Mild to moderate nonspecific diffuse or multifocal cerebral dysfunction.   No seizure seen.    _____________________________________________________________    Barbara Allred MD  Attending Physician, [facility]ile[facility] [place]

## 2022-07-05 NOTE — PROGRESS NOTE ADULT - SUBJECTIVE AND OBJECTIVE BOX
DIABETES FOLLOW UP : Seen earlier today     INTERVAL HX: received multiple doses of humulin r for hyperkalemia , now npo 2/2 ileus , tf being held, son Paige translating in [facility] at bedside , nph discontinued as tf being held       Review of Systems:  General: As above  Cardiovascular: No chest pain  Respiratory: No SOB  GI: No nausea, vomiting  Endocrine: no  S&Sx of hypoglycemia    Allergies    No Known Allergies    Intolerances      MEDICATIONS  (STANDING):  amLODIPine   Tablet 10 milliGRAM(s) Oral <User Schedule>  atorvastatin 40 milliGRAM(s) Oral at bedtime  atovaquone  Suspension 1500 milliGRAM(s) Oral <User Schedule>  chlorhexidine 2% Cloths 1 Application(s) Topical <User Schedule>  dextrose 5% + sodium chloride 0.9%. 1000 milliLiter(s) (50 mL/Hr) IV Continuous <Continuous>  doxazosin 4 milliGRAM(s) Oral daily  enoxaparin Injectable 60 milliGRAM(s) SubCutaneous every 12 hours  epoetin cortney-epbx (RETACRIT) Injectable 38902 Unit(s) SubCutaneous every 7 days  fluconAZOLE IVPB 200 milliGRAM(s) IV Intermittent every 24 hours  fosphenytoin IVPB 100 milliGRAM(s) PE IV Intermittent <User Schedule>  fosphenytoin IVPB 75 milliGRAM(s) PE IV Intermittent <User Schedule>  hydrALAZINE 100 milliGRAM(s) Oral every 8 hours  insulin lispro (ADMELOG) corrective regimen sliding scale   SubCutaneous every 8 hours  lactulose Retention Enema 200 Gram(s) Rectal once  lactulose Syrup 10 Gram(s) Oral three times a day  levothyroxine Injectable 40 MICROGram(s) IV Push at bedtime  mycophenolate mofetil Suspension 500 milliGRAM(s) Oral every 12 hours  pantoprazole   Suspension 40 milliGRAM(s) Oral daily  perampanel 4 milliGRAM(s) Oral at bedtime  polyethylene glycol 3350 17 Gram(s) Oral two times a day  predniSONE  Solution 5 milliGRAM(s) Oral daily  senna Syrup 10 milliLiter(s) Oral daily  sodium zirconium cyclosilicate 10 Gram(s) Oral daily  valGANciclovir 50 mG/mL Oral Solution 450 milliGRAM(s) Oral daily      PHYSICAL EXAM:  VITALS: T(C): 37.5 (07-05-22 @ 11:00)  T(F): 99.5 (07-05-22 @ 11:00), Max: 99.5 (07-05-22 @ 11:00)  HR: 62 (07-05-22 @ 14:00) (48 - 63)  BP: 151/93 (07-05-22 @ 14:00) (128/60 - 171/70)  RR:  (11 - 38)  SpO2:  (91% - 100%)  Wt(kg): --  GENERAL: male laying in bed in [facility]  Respiratory: Respirations unlabored   Extremities: Warm, no edema  NEURO: Alert appears anxious     LABS:  POCT Blood Glucose.: 178 mg/dL (07-05-22 @ 06:37)  POCT Blood Glucose.: 269 mg/dL (07-05-22 @ 05:49)  POCT Blood Glucose.: 142 mg/dL (07-04-22 @ 22:36)  POCT Blood Glucose.: 192 mg/dL (07-04-22 @ 21:13)  POCT Blood Glucose.: 220 mg/dL (07-04-22 @ 21:00)  POCT Blood Glucose.: 236 mg/dL (07-04-22 @ 20:50)  POCT Blood Glucose.: 120 mg/dL (07-04-22 @ 14:50)  POCT Blood Glucose.: 91 mg/dL (07-04-22 @ 06:54)  POCT Blood Glucose.: 83 mg/dL (07-04-22 @ 06:36)  POCT Blood Glucose.: 77 mg/dL (07-04-22 @ 06:05)  POCT Blood Glucose.: 220 mg/dL (07-03-22 @ 21:25)  POCT Blood Glucose.: 197 mg/dL (07-03-22 @ 14:06)  POCT Blood Glucose.: 170 mg/dL (07-03-22 @ 06:04)  POCT Blood Glucose.: 135 mg/dL (07-02-22 @ 22:12)  POCT Blood Glucose.: 167 mg/dL (07-02-22 @ 14:44)                            8.6    5.96  )-----------( 345      ( 05 Jul 2022 06:49 )             28.2       07-05    140  |  102  |  29<H>  ----------------------------<  265<H>  5.7<H>   |  24  |  1.60<H>    Ca    8.9      05 Jul 2022 12:38  Phos  4.0     07-05  Mg     1.9     07-05    TPro  6.2  /  Alb  2.5<L>  /  TBili  0.2  /  DBili  <0.1  /  AST  18  /  ALT  12  /  AlkPhos  152<H>  07-05      eGFR: 47 mL/min/1.73m2 (05 Jul 2022 12:38)  eGFR: 45 mL/min/1.73m2 (05 Jul 2022 06:49)  eGFR: 50 mL/min/1.73m2 (05 Jul 2022 06:12)  eGFR: 50 mL/min/1.73m2 (05 Jul 2022 00:01)  eGFR: 51 mL/min/1.73m2 (04 Jul 2022 19:45)          Thyroid Function Tests:          A1C with Estimated Average Glucose Result: 6.0 % (06-30-22 @ 05:49)  A1C with Estimated Average Glucose Result: 6.6 % (04-24-22 @ 17:12)      Estimated Average Glucose: 126 mg/dL (06-30-22 @ 05:49)  Estimated Average Glucose: 143 mg/dL (04-24-22 @ 17:12)

## 2022-07-05 NOTE — PROGRESS NOTE ADULT - SUBJECTIVE AND OBJECTIVE BOX
[facility] Division of Kidney Diseases & Hypertension  FOLLOW UP NOTE  [phone]--------------------------------------------------------------------------------  Chief Complaint:Acute respiratory failure with hypoxia      HPI: 67 y/o male w/ a PMHx of CAD s/p CABG (2020), AF on Eliquis c/b CVA (2019) c/b seizures, HTN, HLD, DM type II, hypothyroidism, GI bleed due to a duodenal ulcer (2/2022), and ESRD s/p DDRT (4/21/22) c/b DGF requiring HD (last session 4/29/22) & large perinephric hematoma s/p evacuation w/ revision of arterial anastomosis (5/2/22) who presented on 6/10/22 for status epilepticus. Patient was intubated for airway protection. AEDs were adjusted and he was successfully extubated on 6/11. Patient was noted to have a large right pleural effusion so thoracentesis was performed w/ 1.3 L of serosanguineous fluid drained. Patient was started on a heparin infusion for his atrial fibrillation post-procedure but had a H&H drop w/ CXR concerning for a hemothorax. Patient was transferred to SICU for further management. He was intubated for airway protection & was taken to the OR on 6/15 for a right VATS for hematoma washout and placement of a second chest tube. He was transferred to the floors on 6/17. Both chest tubes were removed on 6/18. He went into status epilepticus again on 6/25 so he was readmitted to SICU. He was intubated for airway protection again on 6/26 and successfully extubated on 6/28. Hospital course has also been significant for delirium, dysphagia requiring NGT placement for enteral access, hyperkalemia.  ?PRES on MRI, off Envarsus, switched to Belatacept 6/23. Last dose of Renee given on 7/4          24 hour events/subjective: Patient seen & examined. Labs & vitals reviewed. Currently AAO x 1. On vEEG. Vomited x 1 last night with NGT changed to large bore with intermittent suction. K 5.9 overnight with shifting, lasix, lokelma given. K 5.5 this AM. Pt constipated as per RN.  UO in the last 24 hours 1.6L. Belatacept dose given yesterday.         PAST HISTORY  --------------------------------------------------------------------------------  No significant changes to PMH, PSH, FHx, SHx, unless otherwise noted    ALLERGIES & MEDICATIONS  --------------------------------------------------------------------------------  Allergies    No Known Allergies    Intolerances      Standing Inpatient Medications  amLODIPine   Tablet 10 milliGRAM(s) Oral <User Schedule>  atorvastatin 40 milliGRAM(s) Oral at bedtime  chlorhexidine 2% Cloths 1 Application(s) Topical <User Schedule>  dextrose 5% + sodium chloride 0.9%. 1000 milliLiter(s) IV Continuous <Continuous>  doxazosin 4 milliGRAM(s) Oral daily  enoxaparin Injectable 60 milliGRAM(s) SubCutaneous every 12 hours  epoetin cortney-epbx (RETACRIT) Injectable 82069 Unit(s) SubCutaneous every 7 days  fluconAZOLE IVPB 200 milliGRAM(s) IV Intermittent every 24 hours  fosphenytoin IVPB 100 milliGRAM(s) PE IV Intermittent <User Schedule>  fosphenytoin IVPB 75 milliGRAM(s) PE IV Intermittent <User Schedule>  hydrALAZINE 100 milliGRAM(s) Oral every 8 hours  insulin lispro (ADMELOG) corrective regimen sliding scale   SubCutaneous every 8 hours  lactulose Retention Enema 200 Gram(s) Rectal once  lactulose Syrup 10 Gram(s) Oral three times a day  levothyroxine Injectable 40 MICROGram(s) IV Push at bedtime  mycophenolate mofetil Suspension 500 milliGRAM(s) Oral every 12 hours  pantoprazole   Suspension 40 milliGRAM(s) Oral daily  perampanel 4 milliGRAM(s) Oral at bedtime  polyethylene glycol 3350 17 Gram(s) Oral two times a day  predniSONE  Solution 5 milliGRAM(s) Oral daily  senna Syrup 10 milliLiter(s) Oral daily  valGANciclovir 50 mG/mL Oral Solution 450 milliGRAM(s) Oral daily    PRN Inpatient Medications  acetaminophen    Suspension .. 650 milliGRAM(s) Enteral Tube every 6 hours PRN      REVIEW OF SYSTEMS  --------------------------------------------------------------------------------  unable to obtain      VITALS/PHYSICAL EXAM  --------------------------------------------------------------------------------  T(C): 36.9 (07-05-22 @ 07:00), Max: 37.1 (07-05-22 @ 03:00)  HR: 61 (07-05-22 @ 10:00) (48 - 63)  BP: 150/65 (07-05-22 @ 10:00) (128/60 - 171/70)  RR: 33 (07-05-22 @ 10:00) (11 - 33)  SpO2: 98% (07-05-22 @ 10:00) (91% - 100%)  Wt(kg): --        07-04-22 @ 07:01  -  07-05-22 @ 07:00  --------------------------------------------------------  IN: 2310 mL / OUT: 1865 mL / NET: 445 mL    07-05-22 @ 07:01  -  07-05-22 @ 10:58  --------------------------------------------------------  IN: 400 mL / OUT: 35 mL / NET: 365 mL      Physical Exam:  	Gen: NAD  	HEENT: Anicteric, no JVD  	Pulm: CTA B/L  	CV: RRR, S1S2  	Abd: +BS, soft, nontender/nondistended          Extremities: no bilateral LE edema, UE edema b/l          Neuro: lethargic but arousable, AAO x 1 when awake  	Skin: Warm, without rashes  	Vascular access:                Miranda with clear urine    LABS/STUDIES  --------------------------------------------------------------------------------              8.6    5.96  >-----------<  345      [07-05-22 @ 06:49]              28.2     138  |  100  |  31  ----------------------------<  190      [07-05-22 @ 06:49]  5.5   |  24  |  1.65        Ca     9.2     [07-05-22 @ 06:49]      Mg     2.0     [07-05-22 @ 06:49]      Phos  4.1     [07-05-22 @ 06:49]    TPro  6.2  /  Alb  2.5  /  TBili  0.2  /  DBili  <0.1  /  AST  18  /  ALT  12  /  AlkPhos  152  [07-05-22 @ 06:49]    PT/INR: PT 15.5 , INR 1.34       [07-05-22 @ 06:49]  PTT: 44.2       [07-05-22 @ 06:49]      Creatinine Trend:  SCr 1.65 [07-05 @ 06:49]  SCr 1.52 [07-05 @ 06:12]  SCr 1.52 [07-05 @ 00:01]  SCr 1.50 [07-04 @ 19:45]  SCr 1.51 [07-04 @ 14:11]    Urinalysis - [07-03-22 @ 14:32]      Color Light Yellow / Appearance Clear / SG 1.018 / pH 8.0      Gluc Negative / Ketone Negative  / Bili Negative / Urobili Negative       Blood Negative / Protein Trace / Leuk Est Negative / Nitrite Negative      RBC 1 / WBC 2 / Hyaline 0 / Gran  / Sq Epi  / Non Sq Epi 1 / Bacteria Negative

## 2022-07-05 NOTE — PROGRESS NOTE ADULT - ASSESSMENT
66 yr old M with Type 2 DM> unknown control due to skewed A1C 6.6% due to chronic anemia and s/p blood tx. On basal/bolus insulin therapy PTA. DM c/b ESRD s/p DDRT on 3/21, CVA, CAD s/p CABG, Afib on apixaban, seizure activity, HTN, HLD, h/o GI bleed in 2/2022 EGD with duodenal ulcer, discharged to [facility] rehab center after prior hospitalization, now re-admitted from [facility] for seizures c/f status epilepticus in setting of UTI. endocrine consulted for steroid induced hyperglycemia, now on home dose prednisone 5mg. Prolonged hospital course w/ ICU stay, previously intubated/extubated, previous seizures.   TF Stopped 2/2 vomiting 2/2 ileus.        Type 2 diabetes mellitus with hypoglycemia, with long-term current use of insulin.   NPO 2/2 ileus  FS q6h  BG Goal 100-180mg/dl   Low correction scale q6h  Previously while on tf was on LDSS and NPH 2units q8h   -Please inform endo team of any changes on steroid therapy or PO/TF status    Discharge  - Likely discharge on basal bolus insulin, Plan TBD based on insulin requirements at [place].   Outpatient f/u with Endocrinologist Dr. Lincoln. [address] [facility] [address]. Phone  Needs apt at time of discharge  -Needs optho/renal/cardiac f/u as out pt  -Make sure pt has insulin sand DM supplies at time of discharge.     Problem/Plan - 2:  ·  Problem: HTN (hypertension).   ·  Plan: BP goal and management per primary team/neuro.     Problem/Plan - 3:  ·  Problem: Hypothyroidism.   ·  Plan: -C/w levothyroxine Injectable 40 MICROGram(s) IV Push at bedtime  -Once taking POs can restart LT4 50 mcg daily on an empty stomach at least 1 hour apart from from food or PO meds  - monitor TFTs outpatient.     Problem/Plan - 4:  ·  Problem: HLD (hyperlipidemia).   ·  Plan: -C/w atorvastatin 40 milliGRAM(s) Oral at bedtime  -F/u lipids as out pt.    Discussed with patient and primary  team Kendra HAAS  Contact via [employer] Teams during business hours  On evenings and weekends, please call [phone] or page endocrine fellow on call.   Please note that this patient may be followed by different provider tomorrow.    Time spent on encounter: 26 minutes spent on total encounter; The necessity of the time spent during the encounter on this date of service was due to development of plan of care/coordination of care/glycemic control through review of labs, blood glucose values and vital signs.

## 2022-07-05 NOTE — PROGRESS NOTE ADULT - ASSESSMENT
66M with h/o DM type II, HTN, CAD s/p CABG in 2020, Afib on Eliquis, CVA in 2019 due to Afib, Seizure d/o (last episode was on 4/8/22), h/o GI bleed in 2/2022 EGD with duodenal ulcer and ESRD on HD s/p R DDRT from DCD donor on 4/21/22 complicated by DGF requiring HD until 4/29/22 now with good graft function who presented with status epilepticus in setting of UTI/antibiotics and sub-therapeutic valproic acid level     Seizure Disorder  - repeat MRI head 6/27 with less concerns for PRES, likely ischemic changes  - remains seizure free  - Antiepileptic regimen per Neurology, appreciate recs   - Neuro reconsulted- follow up EEG reading  - Keep Mag > 2  - infectious w/u negative so far, off ABX.  Remains on Fluc.  Transplant ID following, appreciate recs    R DCD DDRT (ureter stented) 4/21/22  - renal function stable  - Strict I/Os, replace tucker prn, can add Flomax or Proscar prn  - TFs off, NGT to LIWS with meds via [place] sump  - No PEG per discussion with son   - STENT REMOVAL this admission- seen in RLQ on AXR 7/5    Hyperkalemia:   - Trend labs q6h  - Lokelma 10g daily   - Consider Flourinef if persistent hyperkalemia     Immunosuppression:   - Belatacept   - continue MMF 500mg BID  - continue Pred 5  - PPx:  continue Bactrim MWF and PPI    DM  - on Lantus/Lispro, Endocrine following     AFib:  - continue Retacrit weekly  - continue Eliquis  - rate controlled    R IJ DVT  - continue Eliquis    HTN:  - continue Norvasc/Hydralazine/Cardura

## 2022-07-05 NOTE — PROGRESS NOTE ADULT - PROBLEM SELECTOR PLAN 1
-R DCD DDRT (ureter stented) 4/21/22  -Episode of ANA inhouse with peak SCr 2.9; now stable at 1.6 today. Allograft function stable with good UOP  - Strict I/Os, replace tucker prn, can add Flomax or Proscar prn  - will remove stent prior to discharge  - On D5 + NS due to NPO status. PEG tube refused by family   - Lasix 40 IV x1 given yesterday

## 2022-07-05 NOTE — PROGRESS NOTE ADULT - ASSESSMENT
67 y/o male w/ a PMHx of CAD s/p CABG, AF on Eliquis c/b CVA c/b seizures, HTN, HLD, DM type II, hypothyroidism, GI bleed due to a duodenal ulcer, and ESRD s/p DDRT c/b DGF requiring HD & large perinephric hematoma s/p evacuation w/ revision of arterial anastomosis who presented in status epilepticus requiring intubation for airway protection with a hospital course c/b large right pleural effusion s/p thoracentesis c/b hemothorax while being anticoagulated & requiring intubation for airway protection s/p chest tube placement w/ CT demonstrating retained hemothorax s/p right VATS, status epilepticus again causing AMS requiring intubation for airway protection again, delirium, dysphagia requiring NGT placement for enteral access, hyperkalemia, and poor glycemic control    PLAN:    Neuro: 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 c/b status epilepticus, delirium  - Assess neurological status every 4 hours in the setting of seizures  - Fosphenytoin w/ daily levels & perampanel for seizures  - Appreciate neurology recommendations  - Pain control if needed w/ acetaminophen    Resp: intubated for airway protection x3 (resolved), large right pleural effusion s/p thoracentesis c/b hemothorax while being anticoagulated s/p chest tube placement c/b retained hemothorax s/p right VATS  - Out of bed to chair, incentive spirometry, and aggressive chest CT to prevent atelectasis    CV: CAD, AF, HTN  - Amlodipine and hydralazine for HTN  - Continue atorvastatin    GI: ileus, h/o dysphagia  - NPO except medications due to episode of vomiting that may be secondary to constipation  - Bowel regimen with senna & Miralax; should consider adding lactulose and manual disimpaction  - Patient's family appears to be agreeable to PEG but given patient's agitation, high risk for inadvertent removal so will need to reassess w/ family  - Protonix for stress ulcer prophylaxis    Renal: Persistent hyperkalemia; s/p DDRT, urinary retention, hyperkalemia (resolved)  - K shifted x 2 in 24 hrs  - Immunosuppression as per transplant surgery (currently on MMF & steroids)  - Doxazosin for urinary retention  - Miranda replaced overnight     Heme: anemia of chronic diseases  - Monitor CBC and coags  - On therapeutic Lovenox instead of Eliquis for possible PEG  - Epogen for anemia of chronic diseases  - Free water 200 mL q12hrs as per nephrology    ID: UTI, immunosuppression  - Monitor WBC, temperature, and procalcitonin  - Empiric antibiotics w/ ertapenem for UTI as per ID  - Fluconazole and Valcyte for immunosuppression prophylaxis  - Holding Bactrim for hyperkalemia    Endo: DM type II, hypothyroidism  - Monitor glucose w/ ISS q6hrs for glycemic control; HgbA1C 6.6% on 4/24  - Synthroid for hypothyroidism    Code Status: Full code    Alejandra Olea PA-C     [id number]

## 2022-07-05 NOTE — PROGRESS NOTE ADULT - ASSESSMENT
65 y/o male w/ a PMHx of CAD s/p CABG (2020), AF on Eliquis c/b CVA (2019) c/b seizures, HTN, HLD, DM type II, hypothyroidism, GI bleed due to a duodenal ulcer (2/2022), and ESRD s/p DDRT (4/21/22) c/b DGF requiring HD (last session 4/29/22) p/w status epilepticus

## 2022-07-05 NOTE — PROGRESS NOTE ADULT - ATTENDING COMMENTS
66 year old male with PMH of CAD s/p CABG in 2020, AF on Eliquis c/b CVA in 2019 c/b seizures, HTN, HLD, DM type II, hypothyroidism, GI bleed due to a duodenal ulcer (2/2022), and ESRD s/p DDRT (4/21/22) c/b DGF requiring HD (last session 4/29/22) admitted with  status epilepticus    1.DCD DDRT on 4/21/220- Allograft function is stable   2.IS meds- on Belatacept  (concern for PRES & seizures) Last dose was on 7/4. on  MMF 500mg BID and  Pred 5 mg po daily   3.Hyperkalemia- start lokelma. d/c bactrim, switch to mepron  4. Altered mentation- Neurology is following. on vEEG and AED management

## 2022-07-05 NOTE — PROGRESS NOTE ADULT - NUTRITIONAL ASSESSMENT
This patient has been assessed with a concern for Malnutrition and has been determined to have a diagnosis/diagnoses of Severe protein-calorie malnutrition.    This patient is being managed with:   Diet NPO-  Except Medications  Entered: Jul 4 2022 11:23PM

## 2022-07-05 NOTE — PROGRESS NOTE ADULT - ASSESSMENT
66 yr old Male with PMHx ESRD s/p permacath removal 5/10/22 s/p Rt DDRT 4/21/22,  CVA (2019), Afib on apixaban, seizure activity, CAD s/p stents, CABG (2020), HTN, HLD, DM on insulin, gastric/duodenal ulcer, recent hospitalization 4/28/22 -5/10/22 with weakness/anemia found to have perinephric hematoma requiring evacuation and repair of bleeding arterial anastomosis. Curently being treated for UTI with Levaquin Today after developing multiple sz episodes refractory to 5 mg versed IM (EMS) and 2 mg ativan IV in E.D. concerning for status epilepticus requiring intubation for hypoxic respiratory  failure. MICU Consult was called for hypoxic respiratory failure secondary to status epilepticus.  Nephrology consulted for renal failure.     A/P  DDRT on 04/21/22  Course complicated by DGF, was on HD until 4/29/22. Readmitted in May for anemia in the setting of large perinephric hematoma s/p evacuation and repair of arterial anastomosis on 5/2/22. Intra operative biopsy showed no rejection.  ANA likely sec to  hemodynamic change   scr fluctuating   optimize glucose   monitor BMP, U/O     Hyperkalemia   suggests to give lokelma today   monitor K closely     HTN   acceptable   monitor BP closely     Immunosuppression  continue per transplant team

## 2022-07-05 NOTE — PROGRESS NOTE ADULT - SUBJECTIVE AND OBJECTIVE BOX
24 HOUR EVENTS:  - started vEEG per neurology  - fosphenytoin decreased to 100/100/75mg from 100/100/100mg  - hyperkalemia got shifted   SUBJECTIVE/ROS:  [x ] A ten-point review of systems was otherwise negative except as noted.  [ ] Due to altered mental status/intubation, subjective information were not able to be obtained from the patient. History was obtained, to the extent possible, from review of the chart and collateral sources of information.      NEURO  RASS:     GCS:     CAM ICU:  Exam:   Meds: acetaminophen    Suspension .. 650 milliGRAM(s) Enteral Tube every 6 hours PRN Mild Pain (1 - 3)  fosphenytoin IVPB 100 milliGRAM(s) PE IV Intermittent <User Schedule>  fosphenytoin IVPB 75 milliGRAM(s) PE IV Intermittent <User Schedule>  perampanel 4 milliGRAM(s) Oral at bedtime    [x] Adequacy of sedation and pain control has been assessed and adjusted      RESPIRATORY  RR: 20 (07-05-22 @ 06:00) (10 - 26)  SpO2: 96% (07-05-22 @ 06:00) (91% - 100%)  Wt(kg): --  Exam: unlabored, clear to auscultation bilaterally  Mechanical Ventilation:   ABG - ( 04 Jul 2022 14:03 )  pH: 7.41  /  pCO2: 46    /  pO2: 88    / HCO3: 29    / Base Excess: 4.0   /  SaO2: 98.8    Lactate: x                [ ] Extubation Readiness Assessed  Meds:       CARDIOVASCULAR  HR: 60 (07-05-22 @ 06:00) (48 - 63)  BP: 171/70 (07-05-22 @ 06:00) (128/60 - 171/70)  BP(mean): 100 (07-05-22 @ 06:00) (86 - 124)  ABP: --  ABP(mean): --  Wt(kg): --  CVP(cm H2O): --      Exam:  Cardiac Rhythm:  Perfusion     [ ]Adequate   [ ]Inadequate  Mentation   [ ]Normal       [ ]Reduced  Extremities  [ ]Warm         [ ]Cool  Volume Status [ ]Hypervolemic [ ]Euvolemic [ ]Hypovolemic  Meds: amLODIPine   Tablet 10 milliGRAM(s) Oral <User Schedule>  doxazosin 4 milliGRAM(s) Oral daily  hydrALAZINE 100 milliGRAM(s) Oral every 8 hours        GI/NUTRITION  Exam:  Diet:  Meds: lactulose Syrup 10 Gram(s) Oral three times a day  pantoprazole   Suspension 40 milliGRAM(s) Oral daily  polyethylene glycol 3350 17 Gram(s) Oral two times a day  senna Syrup 10 milliLiter(s) Oral daily      GENITOURINARY  I&O's Detail    07-04 @ 07:01  -  07-05 @ 07:00  --------------------------------------------------------  IN:    dextrose 5% + sodium chloride 0.9%: 1100 mL    Enteral Tube Flush: 160 mL    Free Water: 400 mL    IV PiggyBack: 650 mL  Total IN: 2310 mL    OUT:    Indwelling Catheter - Urethral (mL): 1815 mL    Nasogastric/Oral tube (mL): 50 mL  Total OUT: 1865 mL    Total NET: 445 mL          07-05    138  |  102  |  32<H>  ----------------------------<  144<H>  5.1   |  25  |  1.52<H>    Ca    9.4      05 Jul 2022 00:01  Phos  3.4     07-05  Mg     2.0     07-05    TPro  6.1  /  Alb  2.6<L>  /  TBili  0.2  /  DBili  <0.1  /  AST  23  /  ALT  16  /  AlkPhos  157<H>  07-05    [ ] Miranda catheter, indication: N/A  Meds: dextrose 5% + sodium chloride 0.9%. 1000 milliLiter(s) IV Continuous <Continuous>        HEMATOLOGIC  Meds: enoxaparin Injectable 60 milliGRAM(s) SubCutaneous every 12 hours    [x] VTE Prophylaxis                        7.7    2.62  )-----------( x        ( 05 Jul 2022 06:13 )             25.1     PT/INR - ( 04 Jul 2022 06:14 )   PT: 15.3 sec;   INR: 1.32 ratio         PTT - ( 04 Jul 2022 06:14 )  PTT:40.0 sec  Transfusion     [ ] PRBC   [ ] Platelets   [ ] FFP   [ ] Cryoprecipitate      INFECTIOUS DISEASES  T(C): 37.1 (07-05-22 @ 05:00), Max: 37.1 (07-05-22 @ 03:00)  Wt(kg): --  WBC Count: 2.62 K/uL (07-05 @ 06:13)  WBC Count: 6.41 K/uL (07-05 @ 00:01)  WBC Count: 5.28 K/uL (07-04 @ 21:19)    Recent Cultures:  Specimen Source: .CSF CSF, 06-29 @ 13:09; Results   Culture is being performed.; Gram Stain:   No polymorphonuclear leukocytes seen  No organisms seen  by cytocentrifuge; Organism: --    Meds: epoetin cortney-epbx (RETACRIT) Injectable 96594 Unit(s) SubCutaneous every 7 days  fluconAZOLE IVPB 200 milliGRAM(s) IV Intermittent every 24 hours  mycophenolate mofetil Suspension 500 milliGRAM(s) Oral every 12 hours  valGANciclovir 50 mG/mL Oral Solution 450 milliGRAM(s) Oral daily        ENDOCRINE  Capillary Blood Glucose    Meds: atorvastatin 40 milliGRAM(s) Oral at bedtime  insulin lispro (ADMELOG) corrective regimen sliding scale   SubCutaneous every 8 hours  levothyroxine Injectable 40 MICROGram(s) IV Push at bedtime  predniSONE  Solution 5 milliGRAM(s) Oral daily        ACCESS DEVICES:  [ ] Peripheral IV  [ ] Central Venous Line	[ ] R	[ ] L	[ ] IJ	[ ] Fem	[ ] SC	Placed:   [ ] Arterial Line		[ ] R	[ ] L	[ ] Fem	[ ] Rad	[ ] Ax	Placed:   [ ] PICC:					[ ] Mediport  [ ] Urinary Catheter, Date Placed:   [ ] Necessity of urinary, arterial, and venous catheters discussed    OTHER MEDICATIONS:  chlorhexidine 2% Cloths 1 Application(s) Topical <User Schedule>      CODE STATUS:     IMAGING: 65 y/o male w/ a PMHx of CAD s/p CABG (2020), AF on Eliquis c/b CVA (2019) c/b seizures, HTN, HLD, DM type II, hypothyroidism, GI bleed due to a duodenal ulcer (2/2022), and ESRD s/p DDRT (4/21/22) c/b DGF requiring HD (last session 4/29/22) & large perinephric hematoma s/p evacuation w/ revision of arterial anastomosis (5/2/22) who presented on 6/10/22 for status epilepticus. Patient was intubated for airway protection. AEDs were adjusted and he was successfully extubated on 6/11. Patient was noted to have a large right pleural effusion so thoracentesis was performed w/ 1.3 L of serosanguineous fluid drained. Patient was started on a heparin infusion for his atrial fibrillation post-procedure but had a H&H drop w/ CXR demonstrating complete right lung whiteout concerning for a hemothorax. Patient was transferred to SICU for further management. He was intubated for airway protection again, transfused several products, and reversed w/ protamine. A right 28 Fr chest tube was placed w/ a significant amount of sanguineous fluid drained. He was noted to have a significant amount of residual hemothorax on CT on 6/12 so he eventually was taken to the OR on 6/15 for a right VATS for hematoma washout and placement of a second chest tube. He was transferred to the floors on 6/17. Both chest tubes were removed on 6/18. He went into status epilepticus again on 6/25 so he was readmitted to SICU. He was intubated for airway protection again on 6/26 and successfully extubated on 6/28. Hospital course has also been significant for delirium, dysphagia requiring NGT placement for enteral access, hyperkalemia, and poor glycemic control. Patient currently denies headache, fevers, chills, dizziness, weakness, shortness of breath, chest pain, abdominal pain, or nausea/vomiting.      24 HOUR EVENTS:  - started vEEG per neurology  - fosphenytoin decreased to 100/100/75mg from 100/100/100mg  - hyperkalemia got shifted once during daytime (500mL of NS, 40mg IV lasix, D50/Insulin, lokelma 10mg q 8hrs) and once overnight (D50/Insulin, albuterol, Calcium, lokelma, kayexylate)  - Emesis during the day and again overnight, feeding tube replaced with NGT to decompress   - Miranda replaced     SUBJECTIVE/ROS:  [x ] A ten-point review of systems was otherwise negative except as noted.  [ ] Due to altered mental status/intubation, subjective information were not able to be obtained from the patient. History was obtained, to the extent possible, from review of the chart and collateral sources of information.      NEURO  RASS:     GCS:     CAM ICU:  Exam: Awake, intermittently following commands.   Meds: acetaminophen    Suspension .. 650 milliGRAM(s) Enteral Tube every 6 hours PRN Mild Pain (1 - 3)  fosphenytoin IVPB 100 milliGRAM(s) PE IV Intermittent <User Schedule>  fosphenytoin IVPB 75 milliGRAM(s) PE IV Intermittent <User Schedule>  perampanel 4 milliGRAM(s) Oral at bedtime    [x] Adequacy of sedation and pain control has been assessed and adjusted      RESPIRATORY  RR: 20 (07-05-22 @ 06:00) (10 - 26)  SpO2: 96% (07-05-22 @ 06:00) (91% - 100%)  Exam: unlabored, clear to auscultation bilaterally  Mechanical Ventilation: N/A  ABG - ( 04 Jul 2022 14:03 )  pH: 7.41  /  pCO2: 46    /  pO2: 88    / HCO3: 29    / Base Excess: 4.0   /  SaO2: 98.8    Lactate: x      [ ] Extubation Readiness Assessed  Meds:       CARDIOVASCULAR  HR: 60 (07-05-22 @ 06:00) (48 - 63)  BP: 171/70 (07-05-22 @ 06:00) (128/60 - 171/70)  BP(mean): 100 (07-05-22 @ 06:00) (86 - 124)    Exam: RRR. +S1, +S2  Cardiac Rhythm: Sinus   Perfusion     [x ]Adequate   [ ]Inadequate  Mentation   [ ]Normal       [x ]Reduced  Extremities  [x ]Warm         [ ]Cool  Volume Status [ ]Hypervolemic [x ]Euvolemic [ ]Hypovolemic  Meds: amLODIPine   Tablet 10 milliGRAM(s) Oral <User Schedule>  doxazosin 4 milliGRAM(s) Oral daily  hydrALAZINE 100 milliGRAM(s) Oral every 8 hours        GI/NUTRITION  Exam: Distended   Diet: NPO   Meds: lactulose Syrup 10 Gram(s) Oral three times a day  pantoprazole   Suspension 40 milliGRAM(s) Oral daily  polyethylene glycol 3350 17 Gram(s) Oral two times a day  senna Syrup 10 milliLiter(s) Oral daily      GENITOURINARY  I&O's Detail    07-04 @ 07:01  -  07-05 @ 07:00  --------------------------------------------------------  IN:    dextrose 5% + sodium chloride 0.9%: 1100 mL    Enteral Tube Flush: 160 mL    Free Water: 400 mL    IV PiggyBack: 650 mL  Total IN: 2310 mL    OUT:    Indwelling Catheter - Urethral (mL): 1815 mL    Nasogastric/Oral tube (mL): 50 mL  Total OUT: 1865 mL    Total NET: 445 mL      07-05    138  |  102  |  32<H>  ----------------------------<  144<H>  5.1   |  25  |  1.52<H>    Ca    9.4      05 Jul 2022 00:01  Phos  3.4     07-05  Mg     2.0     07-05    TPro  6.1  /  Alb  2.6<L>  /  TBili  0.2  /  DBili  <0.1  /  AST  23  /  ALT  16  /  AlkPhos  157<H>  07-05    [x ] Miranda catheter, indication: retention  Meds: dextrose 5% + sodium chloride 0.9%. 1000 milliLiter(s) IV Continuous <Continuous>      HEMATOLOGIC  Meds: enoxaparin Injectable 60 milliGRAM(s) SubCutaneous every 12 hours    [x] VTE Prophylaxis                        7.7    2.62  )-----------( x        ( 05 Jul 2022 06:13 )             25.1     PT/INR - ( 04 Jul 2022 06:14 )   PT: 15.3 sec;   INR: 1.32 ratio         PTT - ( 04 Jul 2022 06:14 )  PTT:40.0 sec  Transfusion     [ ] PRBC   [ ] Platelets   [ ] FFP   [ ] Cryoprecipitate      INFECTIOUS DISEASES  T(C): 37.1 (07-05-22 @ 05:00), Max: 37.1 (07-05-22 @ 03:00)  WBC Count: 2.62 K/uL (07-05 @ 06:13)  WBC Count: 6.41 K/uL (07-05 @ 00:01)  WBC Count: 5.28 K/uL (07-04 @ 21:19)    Recent Cultures:  Specimen Source: .CSF CSF, 06-29 @ 13:09; Results   Culture is being performed.; Gram Stain:   No polymorphonuclear leukocytes seen  No organisms seen  by cytocentrifuge; Organism: --    Meds: epoetin cortney-epbx (RETACRIT) Injectable 39272 Unit(s) SubCutaneous every 7 days  fluconAZOLE IVPB 200 milliGRAM(s) IV Intermittent every 24 hours  mycophenolate mofetil Suspension 500 milliGRAM(s) Oral every 12 hours  valGANciclovir 50 mG/mL Oral Solution 450 milliGRAM(s) Oral daily        ENDOCRINE  Capillary Blood Glucose    Meds: atorvastatin 40 milliGRAM(s) Oral at bedtime  insulin lispro (ADMELOG) corrective regimen sliding scale   SubCutaneous every 8 hours  levothyroxine Injectable 40 MICROGram(s) IV Push at bedtime  predniSONE  Solution 5 milliGRAM(s) Oral daily        ACCESS DEVICES:  [x ] Peripheral IV  [ ] Central Venous Line	[ ] R	[ ] L	[ ] IJ	[ ] Fem	[ ] SC	Placed:   [ ] Arterial Line		[ ] R	[ ] L	[ ] Fem	[ ] Rad	[ ] Ax	Placed:   [ ] PICC:					[ ] Mediport  [x ] Urinary Catheter, Date Placed: 7/5   [ ] Necessity of urinary, arterial, and venous catheters discussed    OTHER MEDICATIONS:  chlorhexidine 2% Cloths 1 Application(s) Topical <User Schedule>      CODE STATUS: Full code     IMAGING:

## 2022-07-05 NOTE — PROGRESS NOTE ADULT - SUBJECTIVE AND OBJECTIVE BOX
[facility]   MD NINA MASON MD, DO SADIE RAMIREZ NP    TEL:  OFFICE: [phone]    From 5pm-7am Answering Service [phone]    -- RENAL FOLLOW UP NOTE ---Date of Service 07-05-22 @ 13:36    Patient is a 66y old  Male who presents with a chief complaint of Status Epilepticus (05 Jul 2022 10:57)      Patient seen and examined in ICU.     VITALS:  T(F): 99.5 (07-05-22 @ 11:00), Max: 99.5 (07-05-22 @ 11:00)  HR: 62 (07-05-22 @ 12:00)  BP: 155/69 (07-05-22 @ 12:00)  RR: 34 (07-05-22 @ 12:00)  SpO2: 93% (07-05-22 @ 12:00)  Wt(kg): --    07-04 @ 07:01  -  07-05 @ 07:00  --------------------------------------------------------  IN: 2310 mL / OUT: 1865 mL / NET: 445 mL    07-05 @ 07:01  -  07-05 @ 13:36  --------------------------------------------------------  IN: 500 mL / OUT: 195 mL / NET: 305 mL          PHYSICAL EXAM:  Constitutional: NAD  Neck: No JVD  Respiratory: CTAB, no wheezes, rales or rhonchi  Cardiovascular: S1, S2, RRR  Gastrointestinal: BS+, soft, NT/ND  Extremities: No peripheral edema    Hospital Medications:   MEDICATIONS  (STANDING):  amLODIPine   Tablet 10 milliGRAM(s) Oral <User Schedule>  atorvastatin 40 milliGRAM(s) Oral at bedtime  atovaquone  Suspension 1500 milliGRAM(s) Oral <User Schedule>  chlorhexidine 2% Cloths 1 Application(s) Topical <User Schedule>  dextrose 5% + sodium chloride 0.9%. 1000 milliLiter(s) (50 mL/Hr) IV Continuous <Continuous>  doxazosin 4 milliGRAM(s) Oral daily  enoxaparin Injectable 60 milliGRAM(s) SubCutaneous every 12 hours  epoetin cortney-epbx (RETACRIT) Injectable 20834 Unit(s) SubCutaneous every 7 days  fluconAZOLE IVPB 200 milliGRAM(s) IV Intermittent every 24 hours  fosphenytoin IVPB 100 milliGRAM(s) PE IV Intermittent <User Schedule>  fosphenytoin IVPB 75 milliGRAM(s) PE IV Intermittent <User Schedule>  hydrALAZINE 100 milliGRAM(s) Oral every 8 hours  insulin lispro (ADMELOG) corrective regimen sliding scale   SubCutaneous every 8 hours  lactulose Retention Enema 200 Gram(s) Rectal once  lactulose Syrup 10 Gram(s) Oral three times a day  levothyroxine Injectable 40 MICROGram(s) IV Push at bedtime  mycophenolate mofetil Suspension 500 milliGRAM(s) Oral every 12 hours  pantoprazole   Suspension 40 milliGRAM(s) Oral daily  perampanel 4 milliGRAM(s) Oral at bedtime  polyethylene glycol 3350 17 Gram(s) Oral two times a day  predniSONE  Solution 5 milliGRAM(s) Oral daily  senna Syrup 10 milliLiter(s) Oral daily  sodium zirconium cyclosilicate 10 Gram(s) Oral daily  valGANciclovir 50 mG/mL Oral Solution 450 milliGRAM(s) Oral daily      LABS:  07-05    140  |  102  |  29<H>  ----------------------------<  265<H>  5.7<H>   |  24  |  1.60<H>    Ca    8.9      05 Jul 2022 12:38  Phos  4.0     07-05  Mg     1.9     07-05    TPro  6.2  /  Alb  2.5<L>  /  TBili  0.2  /  DBili  <0.1  /  AST  18  /  ALT  12  /  AlkPhos  152<H>  07-05    Creatinine Trend: 1.60 <--, 1.65 <--, 1.52 <--, 1.52 <--, 1.50 <--, 1.51 <--, 1.49 <--, 1.49 <--, 1.45 <--, 1.45 <--, 1.48 <--, 1.42 <--, 1.50 <--, 1.47 <--, 1.52 <--, 1.44 <--, 1.34 <--, 1.32 <--, 1.31 <--, 1.33 <--    Phosphorus Level, Serum: 4.0 mg/dL (07-05 @ 12:38)  Albumin, Serum: 2.5 g/dL (07-05 @ 06:49)  Phosphorus Level, Serum: 4.1 mg/dL (07-05 @ 06:49)  Albumin, Serum: 2.5 g/dL (07-05 @ 06:12)  Phosphorus Level, Serum: 3.9 mg/dL (07-05 @ 06:12)  Albumin, Serum: 2.6 g/dL (07-05 @ 00:01)  Phosphorus Level, Serum: 3.4 mg/dL (07-05 @ 00:01)  Phosphorus Level, Serum: 3.9 mg/dL (07-04 @ 19:45)                              8.6    5.96  )-----------( 345      ( 05 Jul 2022 06:49 )             28.2     Urine Studies:  Urinalysis - [07-03-22 @ 14:32]      Color Light Yellow / Appearance Clear / SG 1.018 / pH 8.0      Gluc Negative / Ketone Negative  / Bili Negative / Urobili Negative       Blood Negative / Protein Trace / Leuk Est Negative / Nitrite Negative      RBC 1 / WBC 2 / Hyaline 0 / Gran  / Sq Epi  / Non Sq Epi 1 / Bacteria Negative      Iron 17, TIBC 171, %sat 10      [05-02-22 @ 13:03]  Ferritin 1505      [05-02-22 @ 13:05]  PTH -- (Ca 9.2)      [02-05-22 @ 10:13]   294  HbA1c 6.0      [02-21-20 @ 08:53]      Syphilis Screen (Treponema Pallidum Ab) Negative      [06-27-22 @ 02:00]    RADIOLOGY & ADDITIONAL STUDIES:

## 2022-07-05 NOTE — PROGRESS NOTE ADULT - PROBLEM SELECTOR PLAN 3
likely from bactrim  K 5.9-->5.5  s/p shifting, lokelma, lasix  Start lokelma 10 gm daily  Repeat BMP           If you have any questions, please feel free to contact me  Steffi Cali  Nephrology Fellow  Pager NS: [phone]/ [facility]: [phone]    (After 5 pm or on weekends please page the on-call fellow, can check [email or web address] for schedule. Login is jesus [email or web address], schedule under [facility] medicine, psych, derm)

## 2022-07-05 NOTE — PROGRESS NOTE ADULT - SUBJECTIVE AND OBJECTIVE BOX
Transplant Surgery Multidisciplinary Progress Note   --------------------------------------------------------------  R DCD DDRT    22    Present: Patient seen and examined with multidisciplinary team including Transplant Surgeon: Dr. David,  Nephrologist: Dr. Alfred, NP Altagracia Montelongo and SICU team during am rounds.  Disciplines not in attendance will be notified of the plan.     HPI: 66M with PMH: DM type II, HTN, CAD s/p CABG in , AFib on Eliquis, CVA in  due to Afib, Seizure d/o following CVA, last episode was on 22, h/o GIB in 2022 EGD with duodenal ulcer.  ESRD due to DM was on HD since .     s/p R DCD DDRT on 22.  Donor was 58 , KDPI 82%, DCD, single vessels and ureter, HLA mismatch 1, 2, 2. No DSA, cPRA 0%. CMV +/+  Course complicated by DGF, was on HD until 22. Re-admitted in May for anemia in the setting of large perinephric hematoma s/p evacuation and repair of arterial anastomosis on 22. Intra operative biopsy showed no rejection, Creatinine ranging ~2mg/dL.    In Rehab: He was recently dx with klebsiella UTI rx with ertapenem - then switched to Levaquin day #6.    He also had parainfluenza pneumonia. Was at rehab progressing well.      Hospital course:  - 6/10: transferred from rehab facility for seizure status epilepticus. Intubated for respiratory protection and transferred to MICU.  EEG showed no seizure activity. Neuro consulted.   - : Extubated. Found to have R pleural effusion. s/p Thoracentesis 1.3L. Eliquis changed to heparin drip.  Post procedure drop in H&H. 5u PRBC, 2 u FFP.    -  evening: Transferred to SICU for further care in setting of hypoxia, significant R pleural effusion, hgb 6 and hemodynamic instability  -  Intubated at 9pm and sedated on Precedex.  CT placed, 600ml blood drained.  1L total overnight, started pressors  -  Received Protamine for PTT >100 (was on Heparin drip started for AF), 2 u FFP and 5u PRBC total  -  s/p VATS 2.2L old blood removed; second chest tube placed  - -: chest tubes removed  - : ?PRES on MRI, off Envarsus, switched to Belatacept   - : Tx to SICU for refractory seizures    Interval Events:  - Afebrile, VSS  - Alert to name, agitated delirium  - EEG overnight per epilepsy   - Emesis overnight - NGT placed with minimal output  - Large stool burden on AXR  - Hyperkalemia- treating medically with Lokelma, Insulin and dextrose drip       Immunosuppression:   Induction:  Thymoglobulin                                        Maintenance:  -Belatacept #1  #2 Belatacept  today   -MMF restarted at 500BID ,  continue Pred 5  -Ongoing monitoring for signs of rejection.    Potential Discharge date: pending clinical improvement  Education:  Medications  Plan of care:  See Below      MEDICATIONS  (STANDING):  amLODIPine   Tablet 10 milliGRAM(s) Oral <User Schedule>  atorvastatin 40 milliGRAM(s) Oral at bedtime  belatacept IVPB 625 milliGRAM(s) IV Intermittent once  chlorhexidine 2% Cloths 1 Application(s) Topical <User Schedule>  dextrose 5% + sodium chloride 0.9%. 1000 milliLiter(s) (50 mL/Hr) IV Continuous <Continuous>  doxazosin 4 milliGRAM(s) Oral daily  enoxaparin Injectable 60 milliGRAM(s) SubCutaneous every 12 hours  epoetin cortney-epbx (RETACRIT) Injectable 86156 Unit(s) SubCutaneous every 7 days  fluconAZOLE IVPB 200 milliGRAM(s) IV Intermittent every 24 hours  fosphenytoin IVPB 100 milliGRAM(s) PE IV Intermittent <User Schedule>  fosphenytoin IVPB 75 milliGRAM(s) PE IV Intermittent <User Schedule>  hydrALAZINE 100 milliGRAM(s) Oral every 8 hours  insulin lispro (ADMELOG) corrective regimen sliding scale   SubCutaneous every 8 hours  levothyroxine Injectable 40 MICROGram(s) IV Push at bedtime  mycophenolate mofetil Suspension 500 milliGRAM(s) Oral every 12 hours  pantoprazole   Suspension 40 milliGRAM(s) Oral daily  perampanel 4 milliGRAM(s) Oral at bedtime  polyethylene glycol 3350 17 Gram(s) Oral two times a day  predniSONE  Solution 5 milliGRAM(s) Oral daily  senna Syrup 10 milliLiter(s) Oral daily  sodium zirconium cyclosilicate 10 Gram(s) Oral every 8 hours  valGANciclovir 50 mG/mL Oral Solution 450 milliGRAM(s) Oral daily    MEDICATIONS  (PRN):  acetaminophen    Suspension .. 650 milliGRAM(s) Enteral Tube every 6 hours PRN Mild Pain (1 - 3)      PAST MEDICAL & SURGICAL HISTORY:  Hypertension  Diabetes  Dyslipidemia  CAD (Coronary Artery Disease)=with Stents in 2009 and 2019, s/p off-pump C3L on 20  Hypothyroidism  CVA (cerebral vascular accident)=19 with residual bilateral weakness  Anemia  PEG (percutaneous endoscopic gastrostomy) status-removed 2020  Intubation of airway performed without difficulty-Dec 2019-CVA c/b status epilepticus requiring intubation and PEG in 2019-  ESRD on dialysis-M/W/F  Seizures-after CVA, last seizure was last week 22  History of insertion of stent into coronary artery bypass graft- and   S/P CABG x 3-off pump C3L on 20    Vital Signs Last 24 Hrs  T(C): 37.6 (2022 19:00), Max: 37.6 (2022 19:00)  T(F): 99.7 (2022 19:00), Max: 99.7 (2022 19:00)  HR: 66 (2022 19:00) (51 - 66)  BP: 172/73 (2022 19:00) (128/60 - 172/73)  BP(mean): 105 (2022 19:00) (86 - 124)  RR: 33 (2022 19:00) (11 - 38)  SpO2: 95% (2022 19:00) (91% - 100%)    I&O's Summary    2022 07:01  -  2022 07:00  --------------------------------------------------------  IN: 2310 mL / OUT: 1865 mL / NET: 445 mL    2022 07:01  -  2022 20:59  --------------------------------------------------------  IN: 1230 mL / OUT: 530 mL / NET: 700 mL      LABS:                        8.9    8.02  )-----------( 330      ( 2022 06:14 )             28.4     07-04    137  |  101  |  34<H>  ----------------------------<  51<LL>  5.4<H>   |  26  |  1.51<H>    Ca    9.1      2022 14:11  Phos  3.2     07-  Mg     2.1     07-04    TPro  6.3  /  Alb  2.7<L>  /  TBili  0.2  /  DBili  x   /  AST  21  /  ALT  13  /  AlkPhos  149<H>  07-04    PT/INR - ( 2022 06:14 )   PT: 15.3 sec;   INR: 1.32 ratio         PTT - ( 2022 06:14 )  PTT:40.0 sec  Urinalysis Basic - ( 2022 14:32 )    Color: Light Yellow / Appearance: Clear / S.018 / pH: x  Gluc: x / Ketone: Negative  / Bili: Negative / Urobili: Negative   Blood: x / Protein: Trace / Nitrite: Negative   Leuk Esterase: Negative / RBC: 1 /hpf / WBC 2 /HPF   Sq Epi: x / Non Sq Epi: 1 /hpf / Bacteria: Negative      CAPILLARY BLOOD GLUCOSE  POCT Blood Glucose.: 120 mg/dL (2022 14:50)  POCT Blood Glucose.: 91 mg/dL (2022 06:54)  POCT Blood Glucose.: 83 mg/dL (2022 06:36)  POCT Blood Glucose.: 77 mg/dL (2022 06:05)  POCT Blood Glucose.: 220 mg/dL (2022 21:25)    LIVER FUNCTIONS - ( 2022 06:13 )  Alb: 2.7 g/dL / Pro: 6.3 g/dL / ALK PHOS: 149 U/L / ALT: 13 U/L / AST: 21 U/L / GGT: x             Culture - Fungal, CSF (collected 22 @ 13:09)  Source: .CSF CSF  Preliminary Report (22 @ 09:52):    Testing in progress    Culture - CSF with Gram Stain (collected 22 @ 13:09)  Source: .CSF CSF  Gram Stain (22 @ 18:00):    No polymorphonuclear leukocytes seen    No organisms seen    by cytocentrifuge  Final Report (22 @ 16:04):    No growth    Culture - Acid Fast - CSF (collected 22 @ 13:09)  Source: .CSF CSF  Preliminary Report (22 @ 15:04):    Culture is being performed.    Culture - Nocardia (collected 22 @ 05:06)  Source: .Other Other  Preliminary Report (22 @ 11:38):    No Nocardia isolated    Culture - Blood (collected 22 @ 23:27)  Source: .Blood Blood-Peripheral  Final Report (22 @ 04:00):    No Growth Final    Culture - Blood (collected 22 @ 23:27)  Source: .Blood Blood-Peripheral  Final Report (22 @ 04:00):    No Growth Final          No Growth Final        REVIEW OF SYSTEMS  --------------------------------------------------------------------------------  unable to assess, +delirium        PHYSICAL EXAM:  Constitutional: Well developed / well nourished  Eyes: Anicteric, PERRLA  ENMT: nc/at  Neck: supple  Respiratory: CTA   Cardiovascular: RRR  Gastrointestinal: Soft, non distended, NT, incision healed   Genitourinary: voiding intermittent straight cath   Extremities: SCD's in place and working bilaterally, LUE pitting edema  Vascular: Palpable dp pulses bilaterally  Neurological: more awake today, AAOx1, intermittently following commands, no tremors noted  Skin: no rashes, ulcerations or lesions  Musculoskeletal: Moving all extremities, global weakness  Psychiatric: calm, periods of mild agitation

## 2022-07-05 NOTE — PROGRESS NOTE ADULT - ATTENDING COMMENTS
Pt seen and examined with SICU team on 7/5, agree with above    1. Altered mental status and agitation, recent status epilepticus, seizure disorder:  - Home meds: divalproex 750 qam/ 500 qpm, Keppra 250mg bid  - No further seizures  - Currently on fosphenytoin and perampanel  - F/u Neuro recs    - Given AMS, pt has had feeding tube placed. Per pt's family, patient would not want PEG. Will continue tube feeds for now until mental status improves. Of note, had bilious emesis overnight, xray with large stool burden - enema, lactulose    2. ESRD s/p renal transplant 4/21:  - Home meds: cellcept 500mg bid, envarsus XR 3mg daily, prednisone 5mg daily, fluconazole 200mg daily, bactrim 400mg/80mg daily, valcyte 450mg three times per week  - Hyperkalemia s/p lokelma, continue to monitor    3. Essential HTN:  - Home meds: norvasc 10mg daily, coreg 6.25mg bid  - Continue norvasc  - Coreg being held for bradycardia during this admission  - Hydralazine added for better BP control    4. Paroxysmal atrial fibrillation with h/o CVA:  - Home meds: Eliquis 2.5mg bid, coreg 6.25mg bid  - Currently on therapeutic lovenox    5. HLD:  - Home meds: atorvastatin 40mg  - Continue    6. DM type 2 on long-term insulin, with highly variable fsg during this admission:  - Home meds: Humalog 5 units qac, glargine 6units qhs  - HbA1c 6%  - Monitor fsg for fluctuations, especially when having to hold tube feeds.     7. GERD:  - Home meds: protonix 40mg daily    8. Acquired hypothyroidism:  - Home meds: Synthroid 50mcg daily

## 2022-07-06 NOTE — PROGRESS NOTE ADULT - ASSESSMENT
67yo man with PMH of CVA (2019) with subsequent seizure disorder, ESRD s/p renal transplant (04/2022), afib (on apixaban), CAD (s/p stents), CABG (2020), HTN, HLD, DM presents to the ED with status epilepticus from [facility]. Semiology includes eyes deviated to the R, rhythmic R facial twitching as well as R shoulder clonic movements lasting approximately 30 seconds each. EEG from 04/2022 showed L frontocentral focal onset seizures. S/p intubation on 6/11. EEG negative for seizure but showed structural or functional abnormality in the left fronto-temporal region and moderate to severe nonspecific diffuse or multifocal cerebral dysfunction. RRT called 6/21 for decreased movement of RUE but with increased tone and tremor like activity/ clonus and diffuse increased tone and increased encephalopathy concerning for seizure. Course then complicated by EEG 6/24 showing L frontotemporal/ frontal seizures. Was on keppra, valproic acid, vimpat but had seizures through the prior two medications. Now on fycompa, fosphenytoin. Stopped vimpat, valproic acid and standing ativan.    Corrected phenytoin level: measured level 16.7, corrected 22.0 (7/3)  EEG on 6/24 showing left frontal and frontotemporal onset seizures    Impression: History of recent status epilepticus that was refractory and required current therapy of fycompa and fosphenytoin. Current change in mental status possibly from hospital-induced delirium with toxic-metabolic encephalopathy.     Recommendations:  [] Reduce fosphenytoin to 100mg bid  [] Discontinue Fycompa   [] For stroke standpoint, should restart eliquis for secondary stroke prevention given afib hx if not otherwise contraindicated  [] CSF without evidence of infection, inflammation  [] please obtain Vitamin B12, folate, Vitamin B1, methylmalonic acid, homocysteine, TSH, T3/T4  [x] BP goals of normotension   [x] MR brain without clear evidence of infection, inflammation, or acute CNS event (possibly minimal enhancement). Although prior study read as possible PRES, most recent MRI seems more consistent with significant chronic ischemic microvascular disease given no associated DWI changes or obvious enhancement  [x] Telemetry monitoring  [x] Neuro checks and vital signs per unit protocol  [x] Seizure/fall/aspiration precautions  [] please have adequate sleeping environment for the patient, light on, curtains open, TV on during the day with regular stimulation and out of bed to chair if possible. During the night, close curtains, TV off, lights off, and minimize interruptions (limit blood draws and vital sign checks if possible). Regular interaction with patient with staff (introducing selves), frequent reorientation, and encouragement of visitors as allowed by hospital and unit policy. Regular toileting.  [] move patient to window bed if possible  [] if needed for sleep may administer melatonin 3mg  [] limitation of sedating medications as tolerated  [] maintain electrolytes within normal limits  [/] Advise against driving, operating heavy machinery, water sports/bathing, activity in elevation without appropriate safety precautions  [ ] outpatient EMG/ NCS       Patient seen and discussed with neurology attending, Dr. Ha.  65yo man with PMH of CVA (2019) with subsequent seizure disorder, ESRD s/p renal transplant (04/2022), afib (on apixaban), CAD (s/p stents), CABG (2020), HTN, HLD, DM presents to the ED with status epilepticus from [facility]. Semiology includes eyes deviated to the R, rhythmic R facial twitching as well as R shoulder clonic movements lasting approximately 30 seconds each. EEG from 04/2022 showed L frontocentral focal onset seizures. S/p intubation on 6/11. EEG negative for seizure but showed structural or functional abnormality in the left fronto-temporal region and moderate to severe nonspecific diffuse or multifocal cerebral dysfunction. RRT called 6/21 for decreased movement of RUE but with increased tone and tremor like activity/ clonus and diffuse increased tone and increased encephalopathy concerning for seizure. Course then complicated by EEG 6/24 showing L frontotemporal/ frontal seizures. Was on keppra, valproic acid, vimpat but had seizures through the prior two medications. Now on fycompa, fosphenytoin. Stopped vimpat, valproic acid and standing ativan.    Corrected phenytoin level: measured level 16.7, corrected 22.0 (7/3)  EEG on 6/24 showing left frontal and frontotemporal onset seizures    Impression: History of recent status epilepticus that was refractory and required current therapy of fycompa and fosphenytoin. Current change in mental status possibly from hospital-induced delirium with toxic-metabolic encephalopathy.     Recommendations:  [] Reduce fosphenytoin to 100mg bid  [] Discontinue Fycompa  [] For stroke standpoint, should restart eliquis for secondary stroke prevention given afib hx if not otherwise contraindicated  [] CSF without evidence of infection, inflammation  [] please obtain Vitamin B12, folate, Vitamin B1, methylmalonic acid, homocysteine, TSH, T3/T4  [x] BP goals of normotension   [x] MR brain without clear evidence of infection, inflammation, or acute CNS event (possibly minimal enhancement). Although prior study read as possible PRES, most recent MRI seems more consistent with significant chronic ischemic microvascular disease given no associated DWI changes or obvious enhancement  [x] Telemetry monitoring  [x] Neuro checks and vital signs per unit protocol  [x] Seizure/fall/aspiration precautions  [] please have adequate sleeping environment for the patient, light on, curtains open, TV on during the day with regular stimulation and out of bed to chair if possible. During the night, close curtains, TV off, lights off, and minimize interruptions (limit blood draws and vital sign checks if possible). Regular interaction with patient with staff (introducing selves), frequent reorientation, and encouragement of visitors as allowed by hospital and unit policy. Regular toileting.  [] move patient to window bed if possible  [] if needed for sleep may administer melatonin 3mg  [] limitation of sedating medications as tolerated  [] maintain electrolytes within normal limits  [/] Advise against driving, operating heavy machinery, water sports/bathing, activity in elevation without appropriate safety precautions  [ ] outpatient EMG/ NCS       Patient seen and discussed with neurology attending, Dr. Ha. Discussed with epilepsy attending, Dr. Allred.  65yo man with PMH of CVA (2019) with subsequent seizure disorder, ESRD s/p renal transplant (04/2022), afib (on apixaban), CAD (s/p stents), CABG (2020), HTN, HLD, DM presents to the ED with status epilepticus from [facility]. Semiology includes eyes deviated to the R, rhythmic R facial twitching as well as R shoulder clonic movements lasting approximately 30 seconds each. EEG from 04/2022 showed L frontocentral focal onset seizures. S/p intubation on 6/11. EEG negative for seizure but showed structural or functional abnormality in the left fronto-temporal region and moderate to severe nonspecific diffuse or multifocal cerebral dysfunction. RRT called 6/21 for decreased movement of RUE but with increased tone and tremor like activity/ clonus and diffuse increased tone and increased encephalopathy concerning for seizure. Course then complicated by EEG 6/24 showing L frontotemporal/ frontal seizures. Was on keppra, valproic acid, vimpat but had seizures through the prior two medications. Now on fycompa, fosphenytoin. Stopped vimpat, valproic acid and standing ativan.    Corrected phenytoin level: measured level 16.7, corrected 22.0 (7/3)  EEG on 6/24 showing left frontal and frontotemporal onset seizures    Impression: History of recent status epilepticus that was refractory and required current therapy of fycompa and fosphenytoin. Current change in mental status possibly from hospital-induced delirium with toxic-metabolic encephalopathy.     Recommendations:  [] Reduce fosphenytoin to 100mg bid  [] Discontinue Fycompa  [] restart vEEG 24-48 hours  [] For stroke standpoint, should restart eliquis for secondary stroke prevention given afib hx if not otherwise contraindicated  [] CSF without evidence of infection, inflammation  [] please obtain Vitamin B12, folate, Vitamin B1, methylmalonic acid, homocysteine, TSH, T3/T4  [x] BP goals of normotension   [x] MR brain without clear evidence of infection, inflammation, or acute CNS event (possibly minimal enhancement). Although prior study read as possible PRES, most recent MRI seems more consistent with significant chronic ischemic microvascular disease given no associated DWI changes or obvious enhancement  [x] Telemetry monitoring  [x] Neuro checks and vital signs per unit protocol  [x] Seizure/fall/aspiration precautions  [] please have adequate sleeping environment for the patient, light on, curtains open, TV on during the day with regular stimulation and out of bed to chair if possible. During the night, close curtains, TV off, lights off, and minimize interruptions (limit blood draws and vital sign checks if possible). Regular interaction with patient with staff (introducing selves), frequent reorientation, and encouragement of visitors as allowed by hospital and unit policy. Regular toileting.  [] move patient to window bed if possible  [] if needed for sleep may administer melatonin 3mg  [] limitation of sedating medications as tolerated  [] maintain electrolytes within normal limits  [/] Advise against driving, operating heavy machinery, water sports/bathing, activity in elevation without appropriate safety precautions  [ ] outpatient EMG/ NCS       Patient seen and discussed with neurology attending, Dr. Ha. Discussed with epilepsy attending, Dr. Allred.

## 2022-07-06 NOTE — PROGRESS NOTE ADULT - ASSESSMENT
67 y/o male w/ a PMHx of CAD s/p CABG, AF on Eliquis c/b CVA c/b seizures, HTN, HLD, DM type II, hypothyroidism, GI bleed due to a duodenal ulcer, and ESRD s/p DDRT c/b DGF requiring HD & large perinephric hematoma s/p evacuation w/ revision of arterial anastomosis who presented in status epilepticus requiring intubation for airway protection with a hospital course c/b large right pleural effusion s/p thoracentesis c/b hemothorax while being anticoagulated & requiring intubation for airway protection s/p chest tube placement w/ CT demonstrating retained hemothorax s/p right VATS, status epilepticus again causing AMS requiring intubation for airway protection again, delirium, dysphagia requiring NGT placement for enteral access, hyperkalemia, and poor glycemic control    PLAN:  Neuro: 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 c/b status epilepticus, delirium  - Assess neurological status every 4 hours in the setting of seizures  - Fosphenytoin w/ daily levels & perampanel for seizures  - Appreciate neurology recommendations  - Pain control if needed w/ acetaminophen    Resp: intubated for airway protection x3 (resolved), large right pleural effusion s/p thoracentesis c/b hemothorax while being anticoagulated s/p chest tube placement c/b retained hemothorax s/p right VATS  - Out of bed to chair, incentive spirometry, and aggressive chest PT to prevent atelectasis    CV: CAD, AF, HTN  - Amlodipine and hydralazine for HTN  - Continue atorvastatin    GI: ileus, h/o dysphagia  - NPO except medications due to episode of vomiting, no vomiting in past 24h  - Patient's family appears to be agreeable to PEG but given patient's agitation, high risk for inadvertent removal so will need to reassess w/ family  - Protonix for stress ulcer prophylaxis    Renal: hyperkalemia; s/p DDRT, urinary retention  - Immunosuppression as per transplant surgery (currently on MMF & steroids)  - Doxazosin for urinary retention  - Miranda replaced overnight     Heme: anemia of chronic diseases  - Monitor CBC and coags  - On therapeutic Lovenox instead of Eliquis for possible PEG  - Epogen for anemia of chronic diseases    ID: UTI, immunosuppression  - Monitor WBC, temperature, and procalcitonin  - Fluconazole and Valcyte for immunosuppression prophylaxis  - Holding Bactrim for hyperkalemia    Endo: DM type II, hypothyroidism  - Monitor glucose w/ ISS q6hrs for glycemic control; HgbA1C 6.6% on 4/24  - Synthroid for hypothyroidism    Code Status: Full code

## 2022-07-06 NOTE — PROGRESS NOTE ADULT - PROBLEM SELECTOR PLAN 1
-R DCD DDRT (ureter stented) 4/21/22  -Episode of ANA inhouse with peak SCr 2.9; now stable at 1.7 today. Allograft function stable with good UOP  -Constipation resolved  - Discontinue tucker today & do TOV  - will remove stent prior to discharge  - May discontinue D5 + NS when tube feeds started. Patient's family appears to be agreeable to PEG   - Lasix 40 IV x1 given yesterday      Status epilepticus- Encephalopathy improving. No further seizures.  EEG 6/24 showing L frontotemporal/ frontal seizures. LP -ve. Now on Fosphenytoin  & perampanel for seizures. Stopped vimpat, valproic acid and standing ativan.Await vEEG report.     Hemothorax- s/p CT removal & VATS. Currently on RA.

## 2022-07-06 NOTE — PROGRESS NOTE ADULT - SUBJECTIVE AND OBJECTIVE BOX
[facility] DIVISION OF KIDNEY DISEASES AND HYPERTENSION -- FOLLOW UP NOTE  --------------------------------------------------------------------------------    HPI: 65 y/o male w/ a PMHx of CAD s/p CABG (2020), AF on Eliquis c/b CVA (2019) c/b seizures, HTN, HLD, DM type II, hypothyroidism, GI bleed due to a duodenal ulcer (2/2022), and ESRD s/p DDRT (4/21/22) c/b DGF requiring HD (last session 4/29/22) & large perinephric hematoma s/p evacuation w/ revision of arterial anastomosis (5/2/22) who presented on 6/10/22 for status epilepticus. Patient was intubated for airway protection. AEDs were adjusted and he was successfully extubated on 6/11. Patient was noted to have a large right pleural effusion so thoracentesis was performed w/ 1.3 L of serosanguineous fluid drained. Patient was started on a heparin infusion for his atrial fibrillation post-procedure but had a H&H drop w/ CXR concerning for a hemothorax. Patient was transferred to SICU for further management. He was intubated for airway protection & was taken to the OR on 6/15 for a right VATS for hematoma washout and placement of a second chest tube. He was transferred to the floors on 6/17. Both chest tubes were removed on 6/18. He went into status epilepticus again on 6/25 so he was readmitted to SICU. He was intubated for airway protection again on 6/26 and successfully extubated on 6/28. Hospital course has also been significant for delirium, dysphagia requiring NGT placement for enteral access, hyperkalemia.  ?PRES on MRI, off Envarsus, switched to Belatacept 6/23. Last dose of Renee given on 7/4          24 hour events/subjective: Patient seen & examined. Labs & vitals reviewed. Currently AAO x 2 with mental status improving. Off vEEG. K wnl. Constipation relieved.  UO in the last 24 hours 800mL. Belatacept dose given yesterday.             PAST HISTORY  --------------------------------------------------------------------------------  No significant changes to PMH, PSH, FHx, SHx, unless otherwise noted    ALLERGIES & MEDICATIONS  --------------------------------------------------------------------------------  Allergies    No Known Allergies    Intolerances      Standing Inpatient Medications  amLODIPine   Tablet 10 milliGRAM(s) Oral <User Schedule>  atorvastatin 40 milliGRAM(s) Oral at bedtime  atovaquone  Suspension 1500 milliGRAM(s) Oral <User Schedule>  chlorhexidine 2% Cloths 1 Application(s) Topical <User Schedule>  dextrose 5% + sodium chloride 0.9%. 1000 milliLiter(s) IV Continuous <Continuous>  doxazosin 4 milliGRAM(s) Oral daily  enoxaparin Injectable 60 milliGRAM(s) SubCutaneous every 12 hours  epoetin cortney-epbx (RETACRIT) Injectable 24625 Unit(s) SubCutaneous every 7 days  fluconAZOLE IVPB 200 milliGRAM(s) IV Intermittent every 24 hours  fosphenytoin IVPB 100 milliGRAM(s) PE IV Intermittent <User Schedule>  fosphenytoin IVPB 75 milliGRAM(s) PE IV Intermittent <User Schedule>  hydrALAZINE 100 milliGRAM(s) Oral every 8 hours  insulin lispro (ADMELOG) corrective regimen sliding scale   SubCutaneous every 6 hours  levothyroxine Injectable 40 MICROGram(s) IV Push at bedtime  melatonin 3 milliGRAM(s) Oral at bedtime  mycophenolate mofetil Suspension 500 milliGRAM(s) Oral every 12 hours  pantoprazole   Suspension 40 milliGRAM(s) Oral daily  perampanel 4 milliGRAM(s) Oral at bedtime  predniSONE  Solution 5 milliGRAM(s) Oral daily  sodium zirconium cyclosilicate 10 Gram(s) Oral daily  valGANciclovir 50 mG/mL Oral Solution 450 milliGRAM(s) Oral daily    PRN Inpatient Medications  acetaminophen    Suspension .. 650 milliGRAM(s) Enteral Tube every 6 hours PRN      REVIEW OF SYSTEMS  --------------------------------------------------------------------------------        All other systems were reviewed and are negative, except as noted.    VITALS/PHYSICAL EXAM  --------------------------------------------------------------------------------  T(C): 36.4 (07-06-22 @ 07:00), Max: 37.6 (07-05-22 @ 19:00)  HR: 57 (07-06-22 @ 10:00) (57 - 69)  BP: 164/94 (07-06-22 @ 10:00) (120/55 - 174/75)  RR: 16 (07-06-22 @ 10:00) (16 - 38)  SpO2: 96% (07-06-22 @ 10:00) (91% - 100%)  Wt(kg): --        07-05-22 @ 07:01  -  07-06-22 @ 07:00  --------------------------------------------------------  IN: 1880 mL / OUT: 955 mL / NET: 925 mL    07-06-22 @ 07:01  -  07-06-22 @ 10:57  --------------------------------------------------------  IN: 300 mL / OUT: 155 mL / NET: 145 mL      Physical Exam:  	Gen: NAD  	HEENT: Anicteric, no JVD  	Pulm: CTA B/L  	CV: RRR, S1S2  	Abd: +BS, soft, nontender/nondistended          Extremities: no bilateral LE edema, UE edema b/l          Neuro: AAO x 2 with intermittent confusion, following some commands  	Skin: Warm, without rashes  	Vascular access:                Miranda with clear urine          Transplant: No tenderness, swelling  	      LABS/STUDIES  --------------------------------------------------------------------------------              8.3    9.83  >-----------<  337      [07-06-22 @ 05:37]              27.1     141  |  104  |  26  ----------------------------<  116      [07-06-22 @ 05:37]  4.6   |  27  |  1.71        Ca     8.5     [07-06-22 @ 05:37]      Mg     1.9     [07-06-22 @ 05:37]      Phos  4.0     [07-06-22 @ 05:37]    TPro  6.0  /  Alb  2.5  /  TBili  0.3  /  DBili  0.1  /  AST  18  /  ALT  12  /  AlkPhos  150  [07-06-22 @ 05:37]    PT/INR: PT 16.8 , INR 1.44       [07-06-22 @ 05:37]  PTT: 44.2       [07-06-22 @ 05:37]      Creatinine Trend:  SCr 1.71 [07-06 @ 05:37]  SCr 1.69 [07-06 @ 00:59]  SCr 1.66 [07-05 @ 23:59]  SCr 1.67 [07-05 @ 18:42]  SCr 1.60 [07-05 @ 12:38]              Urinalysis - [07-03-22 @ 14:32]      Color Light Yellow / Appearance Clear / SG 1.018 / pH 8.0      Gluc Negative / Ketone Negative  / Bili Negative / Urobili Negative       Blood Negative / Protein Trace / Leuk Est Negative / Nitrite Negative      RBC 1 / WBC 2 / Hyaline 0 / Gran  / Sq Epi  / Non Sq Epi 1 / Bacteria Negative      Iron 17, TIBC 171, %sat 10      [05-02-22 @ 13:03]  Ferritin 1505      [05-02-22 @ 13:05]  PTH -- (Ca 9.2)      [02-05-22 @ 10:13]   294  HbA1c 6.0      [02-21-20 @ 08:53]      Syphilis Screen (Treponema Pallidum Ab) Negative      [06-27-22 @ 02:00]

## 2022-07-06 NOTE — PROGRESS NOTE ADULT - PROBLEM SELECTOR PLAN 2
-Switched to Belatacept due to neurotoxicity (concern for PRES & seizures)  -Belatacept #1 on 6/23.   -Belatacept #2 given on 7/4, next dose 7/13  -Increase MMF 1g BID  -continue Pred 5  -PPx:  continue PPI, fluconazole; discontinued bactrim due to hyperkalemia. G6PD level okay. Started on Atovaquone

## 2022-07-06 NOTE — PROGRESS NOTE ADULT - PROBLEM SELECTOR PLAN 2
-Switched to Belatacept due to neurotoxicity (concern for PRES & seizures)  -Belatacept #1 on 6/23.   -Belatacept #2 given on 7/4; net dose 7/13  -Increase MMF to 1gm BID  -continue Pred 5  -PPx:  continue PPI, fluconazole, Discontinued bactrim due to hyperkalemia. G6PD level okay. Started on Atovaquone

## 2022-07-06 NOTE — PROGRESS NOTE ADULT - ATTENDING COMMENTS
66 year old male with PMH of CAD s/p CABG in 2020, AF on Eliquis c/b CVA in 2019 c/b seizures, HTN, HLD, DM type II, hypothyroidism, GI bleed due to a duodenal ulcer (2/2022), and ESRD s/p DDRT (4/21/22) c/b DGF requiring HD (last session 4/29/22) admitted with  status epilepticus    1.DCD DDRT on 4/21/220- Allograft function is stable   2.IS meds- on Belatacept  (concern for PRES & seizures) Last dose was on 7/4. on   mg BID and  Prednisone 5 mg po daily   3.Hyperkalemia- resolved on Lokelma, bactrim was d/c and switched  to mepron  4. Altered mentation- Neurology is following. on vEEG and AED management

## 2022-07-06 NOTE — PROGRESS NOTE ADULT - SUBJECTIVE AND OBJECTIVE BOX
24 HOUR EVENTS:  - poor glucose control, episodes of hyperglycemia and hypoglycemia  - Mepron added for PJP ppx  - frequent BMs, d/c'ed senna , miralax and lactulose       SUBJECTIVE/ROS:  [x] A ten-point review of systems was otherwise negative except as noted.  [ ] Due to altered mental status/intubation, subjective information were not able to be obtained from the patient. History was obtained, to the extent possible, from review of the chart and collateral sources of information.      NEURO  Exam:  Awake, intermittently following commands.   Meds: acetaminophen    Suspension .. 650 milliGRAM(s) Enteral Tube every 6 hours PRN Mild Pain (1 - 3)  fosphenytoin IVPB 100 milliGRAM(s) PE IV Intermittent <User Schedule>  fosphenytoin IVPB 75 milliGRAM(s) PE IV Intermittent <User Schedule>  melatonin 3 milliGRAM(s) Oral at bedtime  perampanel 4 milliGRAM(s) Oral at bedtime    [x] Adequacy of sedation and pain control has been assessed and adjusted      RESPIRATORY  RR: 23 (07-06-22 @ 03:00) (17 - 38)  SpO2: 95% (07-06-22 @ 03:00) (91% - 100%)  Wt(kg): --  Exam: unlabored, clear to auscultation bilaterally  ABG - ( 04 Jul 2022 14:03 )  pH: 7.41  /  pCO2: 46    /  pO2: 88    / HCO3: 29    / Base Excess: 4.0   /  SaO2: 98.8    Lactate: x        Meds:       CARDIOVASCULAR  HR: 58 (07-06-22 @ 03:00) (57 - 69)  BP: 147/66 (07-06-22 @ 03:00) (132/93 - 174/75)  BP(mean): 95 (07-06-22 @ 03:00) (82 - 115)  ABP: --  ABP(mean): --  Wt(kg): --  CVP(cm H2O): --  VBG - ( 05 Jul 2022 21:42 )  pH: 7.41  /  pCO2: 46    /  pO2: 48    / HCO3: 29    / Base Excess: 4.0   /  SaO2: 78.4   Lactate: 1.3      Exam:RRR  Cardiac Rhythm:sinus  Perfusion     [x]Adequate   [ ]Inadequate  Mentation   [x]Normal       [ ]Reduced  Extremities  [x]Warm         [ ]Cool  Volume Status [ ]Hypervolemic [x]Euvolemic [ ]Hypovolemic  Meds: amLODIPine   Tablet 10 milliGRAM(s) Oral <User Schedule>  doxazosin 4 milliGRAM(s) Oral daily  hydrALAZINE 100 milliGRAM(s) Oral every 8 hours        GI/NUTRITION  Exam: soft, NTND  Diet: NPO  Meds: pantoprazole   Suspension 40 milliGRAM(s) Oral daily      GENITOURINARY  I&O's Detail    07-04 @ 07:01 - 07-05 @ 07:00  --------------------------------------------------------  IN:    dextrose 5% + sodium chloride 0.9%: 1100 mL    Enteral Tube Flush: 160 mL    Free Water: 400 mL    IV PiggyBack: 650 mL  Total IN: 2310 mL    OUT:    Indwelling Catheter - Urethral (mL): 1815 mL    Nasogastric/Oral tube (mL): 50 mL  Total OUT: 1865 mL    Total NET: 445 mL      07-05 @ 07:01  -  07-06 @ 04:13  --------------------------------------------------------  IN:    dextrose 5% + sodium chloride 0.9%: 450 mL    Enteral Tube Flush: 180 mL    Free Water: 200 mL    IV PiggyBack: 250 mL    sodium chloride 0.9%: 550 mL  Total IN: 1630 mL    OUT:    Indwelling Catheter - Urethral (mL): 775 mL  Total OUT: 775 mL    Total NET: 855 mL          07-06    142  |  105  |  26<H>  ----------------------------<  79  5.0   |  28  |  1.69<H>    Ca    8.6      06 Jul 2022 00:59  Phos  4.0     07-06  Mg     2.0     07-06    TPro  6.2  /  Alb  2.5<L>  /  TBili  0.2  /  DBili  <0.1  /  AST  18  /  ALT  12  /  AlkPhos  152<H>  07-05    [x ] Miranda catheter, indication: strict I&O  Meds: dextrose 5% + sodium chloride 0.9%. 1000 milliLiter(s) IV Continuous <Continuous>        HEMATOLOGIC  Meds: enoxaparin Injectable 60 milliGRAM(s) SubCutaneous every 12 hours    [] VTE Prophylaxis                        8.6    5.96  )-----------( 345      ( 05 Jul 2022 06:49 )             28.2     PT/INR - ( 05 Jul 2022 06:49 )   PT: 15.5 sec;   INR: 1.34 ratio         PTT - ( 05 Jul 2022 06:49 )  PTT:44.2 sec      INFECTIOUS DISEASES  T(C): 36.3 (07-06-22 @ 03:00), Max: 37.6 (07-05-22 @ 19:00)  Wt(kg): --  WBC Count: 5.96 K/uL (07-05 @ 06:49)  WBC Count: 2.62 K/uL (07-05 @ 06:13)    Recent Cultures:  Specimen Source: .CSF CSF, 06-29 @ 13:09; Results   Culture is being performed.; Gram Stain:   No polymorphonuclear leukocytes seen  No organisms seen  by cytocentrifuge; Organism: --    Meds: atovaquone  Suspension 1500 milliGRAM(s) Oral <User Schedule>  epoetin cortney-epbx (RETACRIT) Injectable 98095 Unit(s) SubCutaneous every 7 days  fluconAZOLE IVPB 200 milliGRAM(s) IV Intermittent every 24 hours  mycophenolate mofetil Suspension 500 milliGRAM(s) Oral every 12 hours  valGANciclovir 50 mG/mL Oral Solution 450 milliGRAM(s) Oral daily        ENDOCRINE  Capillary Blood Glucose    Meds: atorvastatin 40 milliGRAM(s) Oral at bedtime  insulin lispro (ADMELOG) corrective regimen sliding scale   SubCutaneous every 6 hours  levothyroxine Injectable 40 MICROGram(s) IV Push at bedtime  predniSONE  Solution 5 milliGRAM(s) Oral daily        ACCESS DEVICES:  [x ] Peripheral IV  [ ] Central Venous Line	[ ] R	[ ] L	[ ] IJ	[ ] Fem	[ ] SC	Placed:   [ ] Arterial Line		[ ] R	[ ] L	[ ] Fem	[ ] Rad	[ ] Ax	Placed:   [ ] PICC:					[ ] Mediport  [ ] Urinary Catheter, Date Placed:   [x ] Necessity of urinary, arterial, and venous catheters discussed    OTHER MEDICATIONS:  chlorhexidine 2% Cloths 1 Application(s) Topical <User Schedule>  sodium zirconium cyclosilicate 10 Gram(s) Oral daily        IMAGING:

## 2022-07-06 NOTE — PROGRESS NOTE ADULT - ATTENDING COMMENTS
Pt seen and examined with SICU team on 7/6, agree with above    1. Altered mental status and agitation, recent status epilepticus, seizure disorder:  - Home meds: divalproex 750 qam/ 500 qpm, Keppra 250mg bid  - No further seizures  - Currently on fosphenytoin and perampanel  - F/u Neuro recs    - Given AMS, pt has had feeding tube placed. Per pt's family, patient would not want PEG. Will continue tube feeds for now until mental status improves. Had some emesis and xray with large stool burden yesterday- with increased bowel regimen, has had multiple BMs. Increase TF to goal. Add MTV and vit C per recs.    2. ESRD s/p renal transplant 4/21:  - Home meds: cellcept 500mg bid, envarsus XR 3mg daily, prednisone 5mg daily, fluconazole 200mg daily, bactrim 400mg/80mg daily, valcyte 450mg three times per week  - Hyperkalemia on lokelma, monitor serial BMPs    3. Essential HTN:  - Home meds: norvasc 10mg daily, coreg 6.25mg bid  - Continue norvasc  - Coreg being held for bradycardia during this admission  - Hydralazine added for better BP control    4. Paroxysmal atrial fibrillation with h/o CVA:  - Home meds: Eliquis 2.5mg bid, coreg 6.25mg bid  - Currently on therapeutic lovenox    5. HLD:  - Home meds: atorvastatin 40mg  - Continue    6. DM type 2 on long-term insulin, with highly variable fsg during this admission:  - Home meds: Humalog 5 units qac, glargine 6units qhs  - HbA1c 6%  - Monitor fsg for fluctuations, especially when having to hold tube feeds.     7. GERD:  - Home meds: protonix 40mg daily    8. Acquired hypothyroidism:  - Home meds: Synthroid 50mcg daily

## 2022-07-06 NOTE — PROGRESS NOTE ADULT - NUTRITIONAL ASSESSMENT
This patient has been assessed with a concern for Malnutrition and has been determined to have a diagnosis/diagnoses of Severe protein-calorie malnutrition.    This patient is being managed with:   Diet NPO with Tube Feed-  Tube Feeding Modality: Nasogastric  Nepro with Carb Steady (NEPRORTH)  Total Volume for 24 Hours (mL): 960  Continuous  Starting Tube Feed Rate {mL per Hour}: 20  Increase Tube Feed Rate by (mL): 10     Every 4 hours  Until Goal Tube Feed Rate (mL per Hour): 40  Tube Feed Duration (in Hours): 24  Tube Feed Start Time: 07:00  Entered: Jul 6 2022 11:20AM

## 2022-07-06 NOTE — PROGRESS NOTE ADULT - SUBJECTIVE AND OBJECTIVE BOX
Transplant Surgery Multidisciplinary Progress Note   --------------------------------------------------------------  R DCD DDRT    22    Present: Patient seen and examined with multidisciplinary team including Transplant Surgeon: Dr. David,  Nephrologist: Dr. Alfred, NP Altagracia Montelongo and SICU team during am rounds.  Disciplines not in attendance will be notified of the plan.     HPI: 66M with PMH: DM type II, HTN, CAD s/p CABG in , AFib on Eliquis, CVA in  due to Afib, Seizure d/o following CVA, last episode was on 22, h/o GIB in 2022 EGD with duodenal ulcer.  ESRD due to DM was on HD since .     s/p R DCD DDRT on 22.  Donor was 58 , KDPI 82%, DCD, single vessels and ureter, HLA mismatch 1, 2, 2. No DSA, cPRA 0%. CMV +/+  Course complicated by DGF, was on HD until 22. Re-admitted in May for anemia in the setting of large perinephric hematoma s/p evacuation and repair of arterial anastomosis on 22. Intra operative biopsy showed no rejection, Creatinine ranging ~2mg/dL.    In Rehab: He was recently dx with klebsiella UTI rx with ertapenem - then switched to Levaquin day #6.    He also had parainfluenza pneumonia. Was at rehab progressing well.      Hospital course:  - 6/10: transferred from rehab facility for seizure status epilepticus. Intubated for respiratory protection and transferred to MICU.  EEG showed no seizure activity. Neuro consulted.   - : Extubated. Found to have R pleural effusion. s/p Thoracentesis 1.3L. Eliquis changed to heparin drip.  Post procedure drop in H&H. 5u PRBC, 2 u FFP.    -  evening: Transferred to SICU for further care in setting of hypoxia, significant R pleural effusion, hgb 6 and hemodynamic instability  -  Intubated at 9pm and sedated on Precedex.  CT placed, 600ml blood drained.  1L total overnight, started pressors  -  Received Protamine for PTT >100 (was on Heparin drip started for AF), 2 u FFP and 5u PRBC total  -  s/p VATS 2.2L old blood removed; second chest tube placed  - -: chest tubes removed  - : ?PRES on MRI, off Envarsus, switched to Belatacept   - : Tx to SICU for refractory seizures    Interval Events:  - Afebrile, VSS  - Alert to name, agitated delirium  - EEG overnight per epilepsy   - Emesis overnight - NGT placed with minimal output  - Large stool burden on AXR  - Hyperkalemia- treating medically with Lokelma, Insulin and dextrose drip       Immunosuppression:   Induction:  Thymoglobulin                                        Maintenance:  -Belatacept #1  #2 Belatacept  today   -MMF restarted at 500BID ,  continue Pred 5  -Ongoing monitoring for signs of rejection.    Potential Discharge date: pending clinical improvement  Education:  Medications  Plan of care:  See Below      MEDICATIONS  (STANDING):  amLODIPine   Tablet 10 milliGRAM(s) Oral <User Schedule>  atorvastatin 40 milliGRAM(s) Oral at bedtime  belatacept IVPB 625 milliGRAM(s) IV Intermittent once  chlorhexidine 2% Cloths 1 Application(s) Topical <User Schedule>  dextrose 5% + sodium chloride 0.9%. 1000 milliLiter(s) (50 mL/Hr) IV Continuous <Continuous>  doxazosin 4 milliGRAM(s) Oral daily  enoxaparin Injectable 60 milliGRAM(s) SubCutaneous every 12 hours  epoetin cortney-epbx (RETACRIT) Injectable 62741 Unit(s) SubCutaneous every 7 days  fluconAZOLE IVPB 200 milliGRAM(s) IV Intermittent every 24 hours  fosphenytoin IVPB 100 milliGRAM(s) PE IV Intermittent <User Schedule>  fosphenytoin IVPB 75 milliGRAM(s) PE IV Intermittent <User Schedule>  hydrALAZINE 100 milliGRAM(s) Oral every 8 hours  insulin lispro (ADMELOG) corrective regimen sliding scale   SubCutaneous every 8 hours  levothyroxine Injectable 40 MICROGram(s) IV Push at bedtime  mycophenolate mofetil Suspension 500 milliGRAM(s) Oral every 12 hours  pantoprazole   Suspension 40 milliGRAM(s) Oral daily  perampanel 4 milliGRAM(s) Oral at bedtime  polyethylene glycol 3350 17 Gram(s) Oral two times a day  predniSONE  Solution 5 milliGRAM(s) Oral daily  senna Syrup 10 milliLiter(s) Oral daily  sodium zirconium cyclosilicate 10 Gram(s) Oral every 8 hours  valGANciclovir 50 mG/mL Oral Solution 450 milliGRAM(s) Oral daily    MEDICATIONS  (PRN):  acetaminophen    Suspension .. 650 milliGRAM(s) Enteral Tube every 6 hours PRN Mild Pain (1 - 3)      PAST MEDICAL & SURGICAL HISTORY:  Hypertension  Diabetes  Dyslipidemia  CAD (Coronary Artery Disease)=with Stents in 2009 and 2019, s/p off-pump C3L on 20  Hypothyroidism  CVA (cerebral vascular accident)=19 with residual bilateral weakness  Anemia  PEG (percutaneous endoscopic gastrostomy) status-removed 2020  Intubation of airway performed without difficulty-Dec 2019-CVA c/b status epilepticus requiring intubation and PEG in 2019-  ESRD on dialysis-M/W/F  Seizures-after CVA, last seizure was last week 22  History of insertion of stent into coronary artery bypass graft- and   S/P CABG x 3-off pump C3L on 20    Vital Signs Last 24 Hrs  T(C): 37.6 (2022 19:00), Max: 37.6 (2022 19:00)  T(F): 99.7 (2022 19:00), Max: 99.7 (2022 19:00)  HR: 66 (2022 19:00) (51 - 66)  BP: 172/73 (2022 19:00) (128/60 - 172/73)  BP(mean): 105 (2022 19:00) (86 - 124)  RR: 33 (2022 19:00) (11 - 38)  SpO2: 95% (2022 19:00) (91% - 100%)    I&O's Summary    2022 07:01  -  2022 07:00  --------------------------------------------------------  IN: 2310 mL / OUT: 1865 mL / NET: 445 mL    2022 07:01  -  2022 20:59  --------------------------------------------------------  IN: 1230 mL / OUT: 530 mL / NET: 700 mL      LABS:                        8.9    8.02  )-----------( 330      ( 2022 06:14 )             28.4     07-04    137  |  101  |  34<H>  ----------------------------<  51<LL>  5.4<H>   |  26  |  1.51<H>    Ca    9.1      2022 14:11  Phos  3.2     07-  Mg     2.1     07-04    TPro  6.3  /  Alb  2.7<L>  /  TBili  0.2  /  DBili  x   /  AST  21  /  ALT  13  /  AlkPhos  149<H>  07-04    PT/INR - ( 2022 06:14 )   PT: 15.3 sec;   INR: 1.32 ratio         PTT - ( 2022 06:14 )  PTT:40.0 sec  Urinalysis Basic - ( 2022 14:32 )    Color: Light Yellow / Appearance: Clear / S.018 / pH: x  Gluc: x / Ketone: Negative  / Bili: Negative / Urobili: Negative   Blood: x / Protein: Trace / Nitrite: Negative   Leuk Esterase: Negative / RBC: 1 /hpf / WBC 2 /HPF   Sq Epi: x / Non Sq Epi: 1 /hpf / Bacteria: Negative      CAPILLARY BLOOD GLUCOSE  POCT Blood Glucose.: 120 mg/dL (2022 14:50)  POCT Blood Glucose.: 91 mg/dL (2022 06:54)  POCT Blood Glucose.: 83 mg/dL (2022 06:36)  POCT Blood Glucose.: 77 mg/dL (2022 06:05)  POCT Blood Glucose.: 220 mg/dL (2022 21:25)    LIVER FUNCTIONS - ( 2022 06:13 )  Alb: 2.7 g/dL / Pro: 6.3 g/dL / ALK PHOS: 149 U/L / ALT: 13 U/L / AST: 21 U/L / GGT: x             Culture - Fungal, CSF (collected 22 @ 13:09)  Source: .CSF CSF  Preliminary Report (22 @ 09:52):    Testing in progress    Culture - CSF with Gram Stain (collected 22 @ 13:09)  Source: .CSF CSF  Gram Stain (22 @ 18:00):    No polymorphonuclear leukocytes seen    No organisms seen    by cytocentrifuge  Final Report (22 @ 16:04):    No growth    Culture - Acid Fast - CSF (collected 22 @ 13:09)  Source: .CSF CSF  Preliminary Report (22 @ 15:04):    Culture is being performed.    Culture - Nocardia (collected 22 @ 05:06)  Source: .Other Other  Preliminary Report (22 @ 11:38):    No Nocardia isolated    Culture - Blood (collected 22 @ 23:27)  Source: .Blood Blood-Peripheral  Final Report (22 @ 04:00):    No Growth Final    Culture - Blood (collected 22 @ 23:27)  Source: .Blood Blood-Peripheral  Final Report (22 @ 04:00):    No Growth Final          No Growth Final        REVIEW OF SYSTEMS  --------------------------------------------------------------------------------  unable to assess, +delirium        PHYSICAL EXAM:  Constitutional: Well developed / well nourished  Eyes: Anicteric, PERRLA  ENMT: nc/at  Neck: supple  Respiratory: CTA   Cardiovascular: RRR  Gastrointestinal: Soft, non distended, NT, incision healed   Genitourinary: voiding intermittent straight cath   Extremities: SCD's in place and working bilaterally, LUE pitting edema  Vascular: Palpable dp pulses bilaterally  Neurological: more awake today, AAOx1, intermittently following commands, no tremors noted  Skin: no rashes, ulcerations or lesions  Musculoskeletal: Moving all extremities, global weakness  Psychiatric: calm, periods of mild agitation Transplant Surgery Multidisciplinary Progress Note   --------------------------------------------------------------  R DCD DDRT    4/21/22    Present: Patient seen and examined with multidisciplinary team including Transplant Surgeon: Dr. Barber,  Nephrologist: Dr. Mary Lomeli/[place], Pharmacist: WALDO Lim and SICU team during am rounds.  Disciplines not in attendance will be notified of the plan.     HPI: 66M with PMH: DM type II, HTN, CAD s/p CABG in 2020, AFib on Eliquis, CVA in 2019 due to Afib, Seizure d/o following CVA, last episode was on 4/8/22, h/o GIB in 2/2022 EGD with duodenal ulcer.  ESRD due to DM was on HD since 2019.     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  Donor was 58 , KDPI 82%, DCD, single vessels and ureter, HLA mismatch 1, 2, 2. No DSA, cPRA 0%. 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 Re-admitted in May for anemia in the setting of large perinephric hematoma s/p evacuation and repair of arterial anastomosis on 5/2/22. Intra operative biopsy showed no rejection, Creatinine ranging ~2mg/dL.    In Rehab: He was recently dx with klebsiella UTI rx with ertapenem - then switched to Levaquin day #6.    He also had parainfluenza pneumonia. Was at rehab progressing well.      Hospital course:  - 6/10: transferred from rehab facility for seizure status epilepticus. Intubated for respiratory protection and transferred to MICU.  EEG showed no seizure activity. Neuro consulted.   - 6/11: Extubated. Found to have R pleural effusion. s/p Thoracentesis 1.3L. Eliquis changed to heparin drip.  Post procedure drop in H&H. 5u PRBC, 2 u FFP.    - 6/11 evening: Transferred to SICU for further care in setting of hypoxia, significant R pleural effusion, hgb 6 and hemodynamic instability  - 6/11 Intubated at 9pm and sedated on Precedex.  CT placed, 600ml blood drained.  1L total overnight, started pressors  - 6/11 Received Protamine for PTT >100 (was on Heparin drip started for AF), 2 u FFP and 5u PRBC total  - 6/13 s/p VATS 2.2L old blood removed; second chest tube placed  - 6/18-19: chest tubes removed  - 6/21: ?PRES on MRI, off Envarsus, switched to Belatacept 6/23  - 6/25: Tx to SICU for refractory seizures    Interval Events:  - Afebrile, VSS  - some improvement in mental status, following more commands, most especially when asked in [place]. repeats certain phrases.  - constipation yesterday, now resolved.  - tolerating TFs at goal      Immunosuppression:   Induction:  Thymoglobulin                                        Maintenance:  -Belatacept #1 6/23 #2 Belatacept 7/4, next dose 7/13  -MMF to 1g BID today,  continue Pred 5  -Ongoing monitoring for signs of rejection.    Potential Discharge date: pending clinical improvement  Education:  Medications  Plan of care:  See Below      MEDICATIONS  (STANDING):  amLODIPine   Tablet 10 milliGRAM(s) Oral <User Schedule>  atorvastatin 40 milliGRAM(s) Oral at bedtime  atovaquone  Suspension 1500 milliGRAM(s) Oral <User Schedule>  chlorhexidine 2% Cloths 1 Application(s) Topical <User Schedule>  doxazosin 4 milliGRAM(s) Oral daily  enoxaparin Injectable 60 milliGRAM(s) SubCutaneous every 12 hours  epoetin cortney-epbx (RETACRIT) Injectable 04755 Unit(s) SubCutaneous every 7 days  fluconAZOLE IVPB 200 milliGRAM(s) IV Intermittent every 24 hours  fosphenytoin IVPB 100 milliGRAM(s) PE IV Intermittent <User Schedule>  fosphenytoin IVPB 75 milliGRAM(s) PE IV Intermittent <User Schedule>  hydrALAZINE 100 milliGRAM(s) Oral every 8 hours  insulin lispro (ADMELOG) corrective regimen sliding scale   SubCutaneous every 6 hours  levothyroxine Injectable 40 MICROGram(s) IV Push at bedtime  melatonin 3 milliGRAM(s) Oral at bedtime  mycophenolate mofetil Suspension 1000 milliGRAM(s) Oral <User Schedule>  pantoprazole   Suspension 40 milliGRAM(s) Oral daily  perampanel 4 milliGRAM(s) Oral at bedtime  predniSONE  Solution 5 milliGRAM(s) Oral daily  sodium zirconium cyclosilicate 10 Gram(s) Oral daily  valGANciclovir 50 mG/mL Oral Solution 450 milliGRAM(s) Oral daily    MEDICATIONS  (PRN):  acetaminophen    Suspension .. 650 milliGRAM(s) Enteral Tube every 6 hours PRN Mild Pain (1 - 3)              Vital Signs Last 24 Hrs  T(C): 36.6 (06 Jul 2022 11:00), Max: 37.6 (05 Jul 2022 19:00)  T(F): 97.9 (06 Jul 2022 11:00), Max: 99.7 (05 Jul 2022 19:00)  HR: 62 (06 Jul 2022 14:00) (57 - 69)  BP: 146/67 (06 Jul 2022 14:00) (120/55 - 174/75)  BP(mean): 96 (06 Jul 2022 14:00) (79 - 116)  RR: 23 (06 Jul 2022 14:00) (16 - 33)  SpO2: 99% (06 Jul 2022 14:00) (88% - 99%)    I&O's Summary    05 Jul 2022 07:01  -  06 Jul 2022 07:00  --------------------------------------------------------  IN: 1880 mL / OUT: 955 mL / NET: 925 mL    06 Jul 2022 07:01  -  06 Jul 2022 15:03  --------------------------------------------------------  IN: 510 mL / OUT: 285 mL / NET: 225 mL                              8.3    9.83  )-----------( 337      ( 06 Jul 2022 05:37 )             27.1     07-06    141  |  104  |  26<H>  ----------------------------<  116<H>  4.6   |  27  |  1.71<H>    Ca    8.5      06 Jul 2022 05:37  Phos  4.0     07-06  Mg     1.9     07-06    TPro  6.0  /  Alb  2.5<L>  /  TBili  0.3  /  DBili  0.1  /  AST  18  /  ALT  12  /  AlkPhos  150<H>  07-06                      REVIEW OF SYSTEMS  --------------------------------------------------------------------------------  unable to assess, +delirium        PHYSICAL EXAM:  Constitutional: Well developed / well nourished  Eyes: Anicteric, PERRLA  ENMT: nc/at  Neck: supple  Respiratory: CTA   Cardiovascular: RRR  Gastrointestinal: Soft, non distended, NT, incision healed   Genitourinary: voiding intermittent straight cath   Extremities: SCD's in place and working bilaterally, LUE pitting edema, improving  Vascular: Palpable dp pulses bilaterally  Neurological: more awake today, AAOx1, intermittently following commands, no tremors noted  Skin: no rashes, ulcerations or lesions  Musculoskeletal: Moving all extremities, global weakness  Psychiatric: calm, periods of mild agitation Transplant Surgery Multidisciplinary Progress Note   --------------------------------------------------------------  R DCD DDRT    4/21/22    Present: Patient seen and examined with multidisciplinary team including Transplant Surgeon: Dr. Barber,  Nephrologist: Dr. Mary Lomeli/[place], Pharmacist: WALDO Lim and SICU team during am rounds.  Disciplines not in attendance will be notified of the plan.     HPI: 66M with PMH: DM type II, HTN, CAD s/p CABG in 2020, AFib on Eliquis, CVA in 2019 due to Afib, Seizure d/o following CVA, last episode was on 4/8/22, h/o GIB in 2/2022 EGD with duodenal ulcer.  ESRD due to DM was on HD since 2019.     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  Donor was 58 , KDPI 82%, DCD, single vessels and ureter, HLA mismatch 1, 2, 2. No DSA, cPRA 0%. 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 Re-admitted in May for anemia in the setting of large perinephric hematoma s/p evacuation and repair of arterial anastomosis on 5/2/22. Intra operative biopsy showed no rejection, Creatinine ranging ~2mg/dL.    In Rehab: He was recently dx with klebsiella UTI rx with ertapenem - then switched to Levaquin day #6.    He also had parainfluenza pneumonia. Was at rehab progressing well.      Hospital course:  - 6/10: transferred from rehab facility for seizure status epilepticus. Intubated for respiratory protection and transferred to MICU.  EEG showed no seizure activity. Neuro consulted.   - 6/11: Extubated. Found to have R pleural effusion. s/p Thoracentesis 1.3L. Eliquis changed to heparin drip.  Post procedure drop in H&H. 5u PRBC, 2 u FFP.    - 6/11 evening: Transferred to SICU for further care in setting of hypoxia, significant R pleural effusion, hgb 6 and hemodynamic instability  - 6/11 Intubated at 9pm and sedated on Precedex.  CT placed, 600ml blood drained.  1L total overnight, started pressors  - 6/11 Received Protamine for PTT >100 (was on Heparin drip started for AF), 2 u FFP and 5u PRBC total  - 6/13 s/p VATS 2.2L old blood removed; second chest tube placed  - 6/18-19: chest tubes removed  - 6/21: ?PRES on MRI, off Envarsus, switched to Belatacept 6/23  - 6/25: Tx to SICU for refractory seizures    Interval Events:  - Afebrile, VSS  - some improvement in mental status, following more commands, most especially when asked in [place]. repeats certain phrases.  - constipation yesterday, now resolved.  - tolerating TFs at goal      Immunosuppression:   Induction:  Thymoglobulin                                        Maintenance:  - Belatacept--will re-load this Friday (gap between initial loading doses 2/2 seizures)  - increase MMF to 1g BID  - continue Pred 5  -Ongoing monitoring for signs of rejection.    Potential Discharge date: pending clinical improvement  Education:  Medications  Plan of care:  See Below      MEDICATIONS  (STANDING):  amLODIPine   Tablet 10 milliGRAM(s) Oral <User Schedule>  atorvastatin 40 milliGRAM(s) Oral at bedtime  atovaquone  Suspension 1500 milliGRAM(s) Oral <User Schedule>  chlorhexidine 2% Cloths 1 Application(s) Topical <User Schedule>  doxazosin 4 milliGRAM(s) Oral daily  enoxaparin Injectable 60 milliGRAM(s) SubCutaneous every 12 hours  epoetin cortney-epbx (RETACRIT) Injectable 94743 Unit(s) SubCutaneous every 7 days  fluconAZOLE IVPB 200 milliGRAM(s) IV Intermittent every 24 hours  fosphenytoin IVPB 100 milliGRAM(s) PE IV Intermittent <User Schedule>  fosphenytoin IVPB 75 milliGRAM(s) PE IV Intermittent <User Schedule>  hydrALAZINE 100 milliGRAM(s) Oral every 8 hours  insulin lispro (ADMELOG) corrective regimen sliding scale   SubCutaneous every 6 hours  levothyroxine Injectable 40 MICROGram(s) IV Push at bedtime  melatonin 3 milliGRAM(s) Oral at bedtime  mycophenolate mofetil Suspension 1000 milliGRAM(s) Oral <User Schedule>  pantoprazole   Suspension 40 milliGRAM(s) Oral daily  perampanel 4 milliGRAM(s) Oral at bedtime  predniSONE  Solution 5 milliGRAM(s) Oral daily  sodium zirconium cyclosilicate 10 Gram(s) Oral daily  valGANciclovir 50 mG/mL Oral Solution 450 milliGRAM(s) Oral daily    MEDICATIONS  (PRN):  acetaminophen    Suspension .. 650 milliGRAM(s) Enteral Tube every 6 hours PRN Mild Pain (1 - 3)              Vital Signs Last 24 Hrs  T(C): 36.6 (06 Jul 2022 11:00), Max: 37.6 (05 Jul 2022 19:00)  T(F): 97.9 (06 Jul 2022 11:00), Max: 99.7 (05 Jul 2022 19:00)  HR: 62 (06 Jul 2022 14:00) (57 - 69)  BP: 146/67 (06 Jul 2022 14:00) (120/55 - 174/75)  BP(mean): 96 (06 Jul 2022 14:00) (79 - 116)  RR: 23 (06 Jul 2022 14:00) (16 - 33)  SpO2: 99% (06 Jul 2022 14:00) (88% - 99%)    I&O's Summary    05 Jul 2022 07:01  -  06 Jul 2022 07:00  --------------------------------------------------------  IN: 1880 mL / OUT: 955 mL / NET: 925 mL    06 Jul 2022 07:01  -  06 Jul 2022 15:03  --------------------------------------------------------  IN: 510 mL / OUT: 285 mL / NET: 225 mL                              8.3    9.83  )-----------( 337      ( 06 Jul 2022 05:37 )             27.1     07-06    141  |  104  |  26<H>  ----------------------------<  116<H>  4.6   |  27  |  1.71<H>    Ca    8.5      06 Jul 2022 05:37  Phos  4.0     07-06  Mg     1.9     07-06    TPro  6.0  /  Alb  2.5<L>  /  TBili  0.3  /  DBili  0.1  /  AST  18  /  ALT  12  /  AlkPhos  150<H>  07-06                      REVIEW OF SYSTEMS  --------------------------------------------------------------------------------  unable to assess, +delirium        PHYSICAL EXAM:  Constitutional: Well developed / well nourished  Eyes: Anicteric, PERRLA  ENMT: nc/at  Neck: supple  Respiratory: CTA   Cardiovascular: RRR  Gastrointestinal: Soft, non distended, NT, incision healed   Genitourinary: voiding intermittent straight cath   Extremities: SCD's in place and working bilaterally, LUE pitting edema, improving  Vascular: Palpable dp pulses bilaterally  Neurological: more awake today, AAOx1, intermittently following commands, no tremors noted  Skin: no rashes, ulcerations or lesions  Musculoskeletal: Moving all extremities, global weakness  Psychiatric: calm, periods of mild agitation

## 2022-07-06 NOTE — PROGRESS NOTE ADULT - SUBJECTIVE AND OBJECTIVE BOX
[facility]   MD NINA MASON MD, DO SADIE RAMIREZ NP    TEL:  OFFICE: [phone]    From 5pm-7am Answering Service [phone]    -- RENAL FOLLOW UP NOTE ---Date of Service 07-06-22 @ 14:02    Patient is a 66y old  Male who presents with a chief complaint of Status Epilepticus (06 Jul 2022 10:57)      Patient seen and examined in ICU.    VITALS:  T(F): 97.9 (07-06-22 @ 11:00), Max: 99.7 (07-05-22 @ 19:00)  HR: 59 (07-06-22 @ 13:00)  BP: 151/65 (07-06-22 @ 13:00)  RR: 17 (07-06-22 @ 13:00)  SpO2: 98% (07-06-22 @ 13:00)  Wt(kg): --    07-05 @ 07:01  -  07-06 @ 07:00  --------------------------------------------------------  IN: 1880 mL / OUT: 955 mL / NET: 925 mL    07-06 @ 07:01  -  07-06 @ 14:02  --------------------------------------------------------  IN: 510 mL / OUT: 285 mL / NET: 225 mL          PHYSICAL EXAM:  Constitutional: NAD  Neck: No JVD  Respiratory: CTAB, no wheezes, rales or rhonchi  Cardiovascular: S1, S2, RRR  Gastrointestinal: BS+, soft, NT/ND  Extremities: No peripheral edema    Hospital Medications:   MEDICATIONS  (STANDING):  amLODIPine   Tablet 10 milliGRAM(s) Oral <User Schedule>  atorvastatin 40 milliGRAM(s) Oral at bedtime  atovaquone  Suspension 1500 milliGRAM(s) Oral <User Schedule>  chlorhexidine 2% Cloths 1 Application(s) Topical <User Schedule>  doxazosin 4 milliGRAM(s) Oral daily  enoxaparin Injectable 60 milliGRAM(s) SubCutaneous every 12 hours  epoetin cortney-epbx (RETACRIT) Injectable 46071 Unit(s) SubCutaneous every 7 days  fluconAZOLE IVPB 200 milliGRAM(s) IV Intermittent every 24 hours  fosphenytoin IVPB 100 milliGRAM(s) PE IV Intermittent <User Schedule>  fosphenytoin IVPB 75 milliGRAM(s) PE IV Intermittent <User Schedule>  hydrALAZINE 100 milliGRAM(s) Oral every 8 hours  insulin lispro (ADMELOG) corrective regimen sliding scale   SubCutaneous every 6 hours  levothyroxine Injectable 40 MICROGram(s) IV Push at bedtime  melatonin 3 milliGRAM(s) Oral at bedtime  mycophenolate mofetil Suspension 1000 milliGRAM(s) Oral <User Schedule>  pantoprazole   Suspension 40 milliGRAM(s) Oral daily  perampanel 4 milliGRAM(s) Oral at bedtime  predniSONE  Solution 5 milliGRAM(s) Oral daily  sodium zirconium cyclosilicate 10 Gram(s) Oral daily  valGANciclovir 50 mG/mL Oral Solution 450 milliGRAM(s) Oral daily      LABS:  07-06    141  |  104  |  26<H>  ----------------------------<  116<H>  4.6   |  27  |  1.71<H>    Ca    8.5      06 Jul 2022 05:37  Phos  4.0     07-06  Mg     1.9     07-06    TPro  6.0  /  Alb  2.5<L>  /  TBili  0.3  /  DBili  0.1  /  AST  18  /  ALT  12  /  AlkPhos  150<H>  07-06    Creatinine Trend: 1.71 <--, 1.69 <--, 1.66 <--, 1.67 <--, 1.60 <--, 1.65 <--, 1.52 <--, 1.52 <--, 1.50 <--, 1.51 <--, 1.49 <--, 1.49 <--, 1.45 <--, 1.45 <--, 1.48 <--, 1.42 <--, 1.50 <--, 1.47 <--, 1.52 <--, 1.44 <--, 1.34 <--, 1.32 <--    Albumin, Serum: 2.5 g/dL (07-06 @ 05:37)  Phosphorus Level, Serum: 4.0 mg/dL (07-06 @ 05:37)  Phosphorus Level, Serum: 4.0 mg/dL (07-06 @ 00:59)  Phosphorus Level, Serum: 4.0 mg/dL (07-05 @ 23:59)  Phosphorus Level, Serum: 4.2 mg/dL (07-05 @ 18:42)                              8.3    9.83  )-----------( 337      ( 06 Jul 2022 05:37 )             27.1     Urine Studies:  Urinalysis - [07-03-22 @ 14:32]      Color Light Yellow / Appearance Clear / SG 1.018 / pH 8.0      Gluc Negative / Ketone Negative  / Bili Negative / Urobili Negative       Blood Negative / Protein Trace / Leuk Est Negative / Nitrite Negative      RBC 1 / WBC 2 / Hyaline 0 / Gran  / Sq Epi  / Non Sq Epi 1 / Bacteria Negative      Iron 17, TIBC 171, %sat 10      [05-02-22 @ 13:03]  Ferritin 1505      [05-02-22 @ 13:05]  PTH -- (Ca 9.2)      [02-05-22 @ 10:13]   294  HbA1c 6.0      [02-21-20 @ 08:53]      Syphilis Screen (Treponema Pallidum Ab) Negative      [06-27-22 @ 02:00]    RADIOLOGY & ADDITIONAL STUDIES:

## 2022-07-06 NOTE — PROGRESS NOTE ADULT - ASSESSMENT
67 y/o male w/ a PMHx of CAD s/p CABG (2020), AF on Eliquis c/b CVA (2019) c/b seizures, HTN, HLD, DM type II, hypothyroidism, GI bleed due to a duodenal ulcer (2/2022), and ESRD s/p DDRT (4/21/22) c/b DGF requiring HD (last session 4/29/22) p/w status epilepticus

## 2022-07-06 NOTE — PROGRESS NOTE ADULT - PROBLEM SELECTOR PLAN 1
-R DCD DDRT (ureter stented) 4/21/22  -Episode of ANA inhouse with peak SCr 2.9; now stable at 1.7 today. Allograft function stable with good UOP  - Constipation resolved. Discontinue tucker & do TOV today  - will remove stent prior to discharge  -May d/c D5 + NS once tube feeds started. Family agreeable to PEG      Status epilepticus- Resolved with encephalopathy likely due to post ictal state. LP -ve. vEEG results pending. AED changed per neuro    Hemothorax- resolved. s/p VATS. Now on RA

## 2022-07-06 NOTE — PROGRESS NOTE ADULT - ASSESSMENT
66 yr old Male with PMHx ESRD s/p permacath removal 5/10/22 s/p Rt DDRT 4/21/22,  CVA (2019), Afib on apixaban, seizure activity, CAD s/p stents, CABG (2020), HTN, HLD, DM on insulin, gastric/duodenal ulcer, recent hospitalization 4/28/22 -5/10/22 with weakness/anemia found to have perinephric hematoma requiring evacuation and repair of bleeding arterial anastomosis. Curently being treated for UTI with Levaquin Today after developing multiple sz episodes refractory to 5 mg versed IM (EMS) and 2 mg ativan IV in E.D. concerning for status epilepticus requiring intubation for hypoxic respiratory  failure. MICU Consult was called for hypoxic respiratory failure secondary to status epilepticus.  Nephrology consulted for renal failure.     A/P  DDRT on 04/21/22  Course complicated by DGF, was on HD until 4/29/22. Readmitted in May for anemia in the setting of large perinephric hematoma s/p evacuation and repair of arterial anastomosis on 5/2/22. Intra operative biopsy showed no rejection.  ANA likely sec to  hemodynamic change   scr remains relatively stable, TOV today   optimize glucose   monitor BMP, U/O     Hyperkalemia   resolved   monitor K closely     HTN   acceptable   monitor BP closely     Immunosuppression  continue per transplant team

## 2022-07-06 NOTE — PROGRESS NOTE ADULT - PROBLEM SELECTOR PLAN 3
likely from bactrim  Now resolved after discontinuation of bactrim & resolution of constipation  May discontinue lokelma 10 gm daily  f/u BMP           If you have any questions, please feel free to contact andrea Cali  Nephrology Fellow  Pager NS: [phone]/ [facility]: [phone]    (After 5 pm or on weekends please page the on-call fellow, can check [email or web address] for schedule. Login is jesus [email or web address], schedule under [facility] medicine, psych, derm)

## 2022-07-06 NOTE — PROGRESS NOTE ADULT - ASSESSMENT
66M with h/o DM type II, HTN, CAD s/p CABG in 2020, Afib on Eliquis, CVA in 2019 due to Afib, Seizure d/o (last episode was on 4/8/22), h/o GI bleed in 2/2022 EGD with duodenal ulcer and ESRD on HD s/p R DDRT from DCD donor on 4/21/22 complicated by DGF requiring HD until 4/29/22 now with good graft function who presented with status epilepticus in setting of UTI/antibiotics and sub-therapeutic valproic acid level     Seizure Disorder  - repeat MRI head 6/27 with less concerns for PRES, likely ischemic changes  - remains seizure free  - Antiepileptic regimen per Neurology, appreciate recs   - Neuro reconsulted- follow up EEG reading  - Keep Mag > 2  - infectious w/u negative so far, off ABX.  Remains on Fluc.  Transplant ID following, appreciate recs    R DCD DDRT (ureter stented) 4/21/22  - renal function stable  - Strict I/Os, replace tucker prn, can add Flomax or Proscar prn  - TFs off, NGT to [facility] with meds via [place] [facility]  - No PEG per discussion with son   - STENT REMOVAL this admission- seen in RLQ on AXR 7/5    Hyperkalemia:   - Trend labs q6h  - Lokelma 10g daily   - Consider Flourinef if persistent hyperkalemia     Immunosuppression:   - Belatacept   - continue MMF 500mg BID  - continue Pred 5  - PPx:  continue Bactrim MWF and PPI    DM  - on Lantus/Lispro, Endocrine following     AFib:  - continue Retacrit weekly  - continue Eliquis  - rate controlled    R IJ DVT  - continue Eliquis    HTN:  - continue Norvasc/Hydralazine/Cardura     66M with h/o DM type II, HTN, CAD s/p CABG in 2020, Afib on Eliquis, CVA in 2019 due to Afib, Seizure d/o (last episode was on 4/8/22), h/o GI bleed in 2/2022 EGD with duodenal ulcer and ESRD on HD s/p R DDRT from DCD donor on 4/21/22 complicated by DGF requiring HD until 4/29/22 now with good graft function who presented with status epilepticus in setting of UTI/antibiotics and sub-therapeutic valproic acid level     Seizure Disorder  - repeat MRI head 6/27 with less concerns for PRES, likely ischemic changes  - remains seizure free  - Antiepileptic regimen per Neurology, please follow recs daily.  start EEG as instructed  - Keep Mag > 2  - infectious w/u negative so far, off ABX.  Remains on Fluc.  Transplant ID following, appreciate recs    R DCD DDRT (ureter stented) 4/21/22  - renal function stable  - Strict I/Os, replace tucker prn, can add Flomax or Proscar prn  - TFs off, NGT to [facility] with meds via [facility]  - No PEG per discussion with son   - STENT REMOVAL this admission- seen in RLQ on AXR 7/5    Hyperkalemia:   - Trend labs  - Lokelma 10g daily   - Consider Florinef if persistent hyperkalemia     Immunosuppression:   - Belatacept--will re-load this Friday (gap between initial loading doses 2/2 seizures)  - increase MMF to 1g BID  - continue Pred 5  - PPx:  Mepron/Valcyte/Fluc/PPI    DM  - on Lantus/Lispro, Endocrine following     AFib:  - continue Retacrit weekly  - would switch to Eliquis as able  - rate controlled    R IJ DVT  - would switch to Eliquis as able    HTN:  - continue Norvasc/Hydralazine/Cardura

## 2022-07-06 NOTE — PROGRESS NOTE ADULT - SUBJECTIVE AND OBJECTIVE BOX
[facility] DIVISION OF KIDNEY DISEASES AND HYPERTENSION -- FOLLOW UP NOTE  --------------------------------------------------------------------------------        HPI: 65 y/o male w/ a PMHx of CAD s/p CABG (2020), AF on Eliquis c/b CVA (2019) c/b seizures, HTN, HLD, DM type II, hypothyroidism, GI bleed due to a duodenal ulcer (2/2022), and ESRD s/p DDRT (4/21/22) c/b DGF requiring HD (last session 4/29/22) & large perinephric hematoma s/p evacuation w/ revision of arterial anastomosis (5/2/22) who presented on 6/10/22 for status epilepticus. Patient was intubated for airway protection. AEDs were adjusted and he was successfully extubated on 6/11. Patient was noted to have a large right pleural effusion so thoracentesis was performed w/ 1.3 L of serosanguineous fluid drained. Patient was started on a heparin infusion for his atrial fibrillation post-procedure but had a H&H drop w/ CXR concerning for a hemothorax. Patient was transferred to SICU for further management. He was intubated for airway protection & was taken to the OR on 6/15 for a right VATS for hematoma washout and placement of a second chest tube. He was transferred to the floors on 6/17. Both chest tubes were removed on 6/18. He went into status epilepticus again on 6/25 so he was readmitted to SICU. He was intubated for airway protection again on 6/26 and successfully extubated on 6/28. Hospital course has also been significant for delirium, dysphagia requiring NGT placement for enteral access, hyperkalemia.  ?PRES on MRI, off Envarsus, switched to Belatacept 6/23. Last dose of Renee given on 7/4        24 hour events/subjective: Patient seen & examined. Labs & vitals reviewed. Currently AAO x 2 with mental status improving, does have intermittent confusion & needs to be re-oriented. Off Elsy ORANTES wnl today. Pt had a large BM.   UO in the last 24 hours 875cc. Belatacept dose given yesterday.           PAST HISTORY  --------------------------------------------------------------------------------  No significant changes to PMH, PSH, FHx, SHx, unless otherwise noted    ALLERGIES & MEDICATIONS  --------------------------------------------------------------------------------  Allergies    No Known Allergies    Intolerances      Standing Inpatient Medications  amLODIPine   Tablet 10 milliGRAM(s) Oral <User Schedule>  atorvastatin 40 milliGRAM(s) Oral at bedtime  atovaquone  Suspension 1500 milliGRAM(s) Oral <User Schedule>  chlorhexidine 2% Cloths 1 Application(s) Topical <User Schedule>  dextrose 5% + sodium chloride 0.9%. 1000 milliLiter(s) IV Continuous <Continuous>  doxazosin 4 milliGRAM(s) Oral daily  enoxaparin Injectable 60 milliGRAM(s) SubCutaneous every 12 hours  epoetin cortney-epbx (RETACRIT) Injectable 33674 Unit(s) SubCutaneous every 7 days  fluconAZOLE IVPB 200 milliGRAM(s) IV Intermittent every 24 hours  fosphenytoin IVPB 100 milliGRAM(s) PE IV Intermittent <User Schedule>  fosphenytoin IVPB 75 milliGRAM(s) PE IV Intermittent <User Schedule>  hydrALAZINE 100 milliGRAM(s) Oral every 8 hours  insulin lispro (ADMELOG) corrective regimen sliding scale   SubCutaneous every 6 hours  levothyroxine Injectable 40 MICROGram(s) IV Push at bedtime  melatonin 3 milliGRAM(s) Oral at bedtime  mycophenolate mofetil Suspension 500 milliGRAM(s) Oral every 12 hours  pantoprazole   Suspension 40 milliGRAM(s) Oral daily  perampanel 4 milliGRAM(s) Oral at bedtime  predniSONE  Solution 5 milliGRAM(s) Oral daily  sodium zirconium cyclosilicate 10 Gram(s) Oral daily  valGANciclovir 50 mG/mL Oral Solution 450 milliGRAM(s) Oral daily    PRN Inpatient Medications  acetaminophen    Suspension .. 650 milliGRAM(s) Enteral Tube every 6 hours PRN      REVIEW OF SYSTEMS  -------------------------------------------------------------------------------      All other systems were reviewed and are negative, except as noted.    VITALS/PHYSICAL EXAM  --------------------------------------------------------------------------------  T(C): 36.4 (07-06-22 @ 07:00), Max: 37.6 (07-05-22 @ 19:00)  HR: 57 (07-06-22 @ 10:00) (57 - 69)  BP: 164/94 (07-06-22 @ 10:00) (120/55 - 174/75)  RR: 16 (07-06-22 @ 10:00) (16 - 38)  SpO2: 96% (07-06-22 @ 10:00) (91% - 100%)  Wt(kg): --        07-05-22 @ 07:01  -  07-06-22 @ 07:00  --------------------------------------------------------  IN: 1880 mL / OUT: 955 mL / NET: 925 mL    07-06-22 @ 07:01  -  07-06-22 @ 10:28  --------------------------------------------------------  IN: 300 mL / OUT: 155 mL / NET: 145 mL      Physical Exam:  	Gen: NAD  	HEENT: Anicteric, no JVD  	Pulm: CTA B/L  	CV: RRR, S1S2  	Abd: +BS, soft, nontender/nondistended          Extremities: no bilateral LE edema, UE edema b/l          Neuro:  AAO x 2 with mental status improving, does have intermittent confusion, following commands  	Skin: Warm, without rashes  	Vascular access:                Miranda with clear urine          LABS/STUDIES  --------------------------------------------------------------------------------              8.3    9.83  >-----------<  337      [07-06-22 @ 05:37]              27.1     141  |  104  |  26  ----------------------------<  116      [07-06-22 @ 05:37]  4.6   |  27  |  1.71        Ca     8.5     [07-06-22 @ 05:37]      Mg     1.9     [07-06-22 @ 05:37]      Phos  4.0     [07-06-22 @ 05:37]    TPro  6.0  /  Alb  2.5  /  TBili  0.3  /  DBili  0.1  /  AST  18  /  ALT  12  /  AlkPhos  150  [07-06-22 @ 05:37]    PT/INR: PT 16.8 , INR 1.44       [07-06-22 @ 05:37]  PTT: 44.2       [07-06-22 @ 05:37]      Creatinine Trend:  SCr 1.71 [07-06 @ 05:37]  SCr 1.69 [07-06 @ 00:59]  SCr 1.66 [07-05 @ 23:59]  SCr 1.67 [07-05 @ 18:42]  SCr 1.60 [07-05 @ 12:38]              Urinalysis - [07-03-22 @ 14:32]      Color Light Yellow / Appearance Clear / SG 1.018 / pH 8.0      Gluc Negative / Ketone Negative  / Bili Negative / Urobili Negative       Blood Negative / Protein Trace / Leuk Est Negative / Nitrite Negative      RBC 1 / WBC 2 / Hyaline 0 / Gran  / Sq Epi  / Non Sq Epi 1 / Bacteria Negative      Iron 17, TIBC 171, %sat 10      [05-02-22 @ 13:03]  Ferritin 1505      [05-02-22 @ 13:05]  PTH -- (Ca 9.2)      [02-05-22 @ 10:13]   294  HbA1c 6.0      [02-21-20 @ 08:53]      Syphilis Screen (Treponema Pallidum Ab) Negative      [06-27-22 @ 02:00]

## 2022-07-06 NOTE — H&P ADULT - NSHPREVIEWOFSYSTEMS_GEN_ALL_CORE
Patient A&Ox4, no c/o pain. Patient discharged to home this afternoon, no distress noted. Review of systems  Gen: No weight changes, fatigue, fevers/chills, weakness  Skin: No rashes  Head/Eyes/Ears/Mouth: No headache; Normal hearing; Normal vision w/o blurriness; No sinus pain/discomfort, sore throat  Respiratory: No dyspnea, cough, wheezing, hemoptysis  CV: No chest pain, PND, orthopnea  GI: Mild abdominal pain at surgical incision site; denies diarrhea, constipation, nausea, vomiting, melena, hematochezia  : No increased frequency, dysuria, hematuria, nocturia  MSK: No joint pain/swelling; no back pain; no edema  Neuro: No dizziness/lightheadedness, weakness, seizures, numbness, tingling  Heme: No easy bruising or bleeding  Endo: No heat/cold intolerance  Psych: No significant nervousness, anxiety, stress, depression  All other systems were reviewed and are negative, except as noted. Gen: No weight changes, fatigue, fevers/chills, weakness  Skin: No rashes  Head/Eyes/Ears/Mouth: No headache; Normal hearing; Normal vision w/o blurriness; No sinus pain/discomfort, sore throat  Respiratory: No dyspnea, cough, wheezing, hemoptysis  CV: No chest pain, PND, orthopnea  GI: no abdominal pain, denies diarrhea, constipation, nausea, vomiting, melena, hematochezia  : No increased frequency, dysuria, hematuria, nocturia  MSK: No joint pain/swelling; no back pain; no edema  Neuro: No dizziness/lightheadedness, weakness, seizures, numbness, tingling  Heme: No easy bruising or bleeding  Endo: No heat/cold intolerance  Psych: No significant nervousness, anxiety, stress, depression  All other systems were reviewed and are negative, except as noted.

## 2022-07-06 NOTE — PROGRESS NOTE ADULT - SUBJECTIVE AND OBJECTIVE BOX
Neurology Progress Note    SUBJECTIVE/OBJECTIVE/INTERVAL EVENTS:   Patient seen and examined at bedside. Has had agitation and episodes of cursing. Unable to provide further ROS or history.     MEDICATIONS  (STANDING):  amLODIPine   Tablet 10 milliGRAM(s) Oral <User Schedule>  atorvastatin 40 milliGRAM(s) Oral at bedtime  atovaquone  Suspension 1500 milliGRAM(s) Oral <User Schedule>  chlorhexidine 2% Cloths 1 Application(s) Topical <User Schedule>  doxazosin 4 milliGRAM(s) Oral daily  enoxaparin Injectable 60 milliGRAM(s) SubCutaneous every 12 hours  epoetin cortney-epbx (RETACRIT) Injectable 66851 Unit(s) SubCutaneous every 7 days  fluconAZOLE IVPB 200 milliGRAM(s) IV Intermittent every 24 hours  fosphenytoin IVPB 100 milliGRAM(s) PE IV Intermittent <User Schedule>  fosphenytoin IVPB 75 milliGRAM(s) PE IV Intermittent <User Schedule>  hydrALAZINE 100 milliGRAM(s) Oral every 8 hours  insulin lispro (ADMELOG) corrective regimen sliding scale   SubCutaneous every 6 hours  levothyroxine Injectable 40 MICROGram(s) IV Push at bedtime  melatonin 3 milliGRAM(s) Oral at bedtime  mycophenolate mofetil Suspension 1000 milliGRAM(s) Oral <User Schedule>  pantoprazole   Suspension 40 milliGRAM(s) Oral daily  perampanel 4 milliGRAM(s) Oral at bedtime  predniSONE  Solution 5 milliGRAM(s) Oral daily  sodium zirconium cyclosilicate 10 Gram(s) Oral daily  valGANciclovir 50 mG/mL Oral Solution 450 milliGRAM(s) Oral daily    MEDICATIONS  (PRN):  acetaminophen    Suspension .. 650 milliGRAM(s) Enteral Tube every 6 hours PRN Mild Pain (1 - 3)    VITALS & EXAMINATION:  Vital Signs Last 24 Hrs  T(C): 36.6 (06 Jul 2022 11:00), Max: 37.6 (05 Jul 2022 19:00)  T(F): 97.9 (06 Jul 2022 11:00), Max: 99.7 (05 Jul 2022 19:00)  HR: 59 (06 Jul 2022 13:00) (57 - 69)  BP: 151/65 (06 Jul 2022 13:00) (120/55 - 174/75)  BP(mean): 94 (06 Jul 2022 13:00) (79 - 116)  RR: 17 (06 Jul 2022 13:00) (16 - 33)  SpO2: 98% (06 Jul 2022 13:00) (88% - 99%)    General appearance: does not appear to be in pain  Head: normocephalic  Eyes: clear sclera  Extremities: no ezio LE edema  Respiratory: breathing regularly    Focused neurologic exam:  MS - awake, alert but perseverates and only follows simple commands rarely. Attend to stimuli from both sides.   CN - pupils equal round, EOMI, BTT b/l, no apparent facial asymmetry, hearing intact to conversation b/l   Motor - Normal bulk. Inc tone in L side and normal tone in R side. Spontaneous movements of L > R side. Grimaces, withdraws, and localizes to strong noxious in LUE 4+/5, LLE 4-/5, RUE 3+/5, RLE 2/5  Sens - LT/temp intact all, as above  Coord - PERRY  Gait and station - [facility]    LABORATORY:                          8.3    9.83  )-----------( 337      ( 06 Jul 2022 05:37 )             27.1     07-06    141  |  104  |  26<H>  ----------------------------<  116<H>  4.6   |  27  |  1.71<H>    Ca    8.5      06 Jul 2022 05:37  Phos  4.0     07-06  Mg     1.9     07-06    TPro  6.0  /  Alb  2.5<L>  /  TBili  0.3  /  DBili  0.1  /  AST  18  /  ALT  12  /  AlkPhos  150<H>  07-06    PT/INR - ( 06 Jul 2022 05:37 )   PT: 16.8 sec;   INR: 1.44 ratio      PTT - ( 06 Jul 2022 05:37 )  PTT:44.2 sec    Cerebrospinal Fluid Cell Count-1 (06.29.22 @ 13:18)   Total Nucleated Cell Count, CSF: 1 /uL   RBC Count - Spinal Fluid: 15 /uL   CSF Color: No Color   Tube Type: Tube 4   CSF Appearance: Clear   CSF Lymphocytes: 58 %   CSF Monocytes/Macrophages: 21 %   CSF Segmented Neutrophils: 21: Differential is based on _24 cells counted after cytocentrifugation. %   Glucose, CSF (06.29.22 @ 13:18)   Glucose, CSF: 125 mg/dL   Protein, CSF (06.29.22 @ 13:18)   Protein, CSF: 46 mg/dL   Lactate Dehydrogenase, CSF (06.29.22 @ 13:18)   Lactate Dehydrogenase, CSF: 37:  Cryptococcal Antigen - CSF (06.29.22 @ 13:18)   Cryptococcal Antigen - CSF: Negative:   CSF PCR Panel (06.29.22 @ 13:18)   CSF PCR Result: NotDetec:  Herpes Simplex Virus 1/2 PCR, CSF (06.29.22 @ 13:18)   Source HSV 1/2: CSF   HSV 1/2 PCR: NotDetec:     STUDIES & IMAGING: (EEG, CT, MR, U/S, TTE/DINAH):    < from: MR Head w/wo IV Cont (06.27.22 @ 15:23) >    ACC: [id number] EXAM:  MR BRAIN WAW IC                          PROCEDURE DATE:  06/27/2022      INTERPRETATION:  CLINICAL INDICATION: Altered mental status, seizures    Magnetic resonance imaging of the brain was carried out with transaxial   SPGR, FLAIR, fast spin echo T2 weighted images, axial susceptibility   weighted series, diffusion weighted series and sagittal T1 weighted   series on a 1.5 Anisa magnet. Post contrast axial, coronal and sagittal   T1 weighted images were obtained. 6 cc of Gadavist were intravenously   injected, 1.5 cc were discarded.    Comparison is made with the prior brain CT of 6/24/2022.    Mild to moderate atrophy is identified with ventricular and sulcal   prominence. Extensive small vessel white matter ischemic changes are   noted. There is extensive bifrontal parietal encephalomalacia and gliosis   involving the centrum semiovale ovale in the border zone regions. These   do not demonstrate diffusion restriction and demonstrate bright signal   intensity on T1-weighted images which does not lymphoma on susceptibility   weighted series suggesting laminar necrosis rather than old hemorrhage.   After contrast administration there is mild gyral enhancement in a small   portion of the frontal cortex. There is no evidence of cerebritis or   abscess.    IMPRESSION: Extensive bilateral frontal parietal gliosis and   encephalomalacia with minimal enhancement which may be related to   ischemic changes versus changes of hypertensive encephalopathy in a   patient with renal transplant on immunosuppressants. No evidence of   cerebritis or abscess.    --- End of Report ---    TIMOTHY GOMES MD; Attending Radiologist  This document has been electronically signed. Jun 27 2022  4:03PM    < end of copied text >      vEEG 6/28 -  EEG SUMMARY/CLASSIFICATION    Abnormal EEG in an altered / a sedated patient.  - Rare right frontal and left frontal sharp waves  - Patient events as described above  - Left frontotemporal slowing  - Mild to moderate generalized slowing    _____________________________________________________________  EEG IMPRESSION/CLINICAL CORRELATE    Abnormal EEG study.  Event of left leg shaking was not epileptic.  Potential epileptogenic foci in the right frontal and left frontal regions.  Structural or functional abnormality in the left frontotemporal region.  Mild to moderate nonspecific diffuse or multifocal cerebral dysfunction.   No seizure seen.      MRI brain w/ and w/o 6/27 - Extensive bilateral frontal parietal gliosis and encephalomalacia with minimal enhancement which may be related to ischemic changes versus changes of hypertensive encephalopathy in a patient with renal transplant on immunosuppressants. No evidence of cerebritis or abscess.      < from: CT Head No Cont (06.24.22 @ 10:29) >    ACC: [id number] EXAM:  CT BRAIN                          PROCEDURE DATE:  06/24/2022      INTERPRETATION:  .    CLINICAL INFORMATION: Worsening lethargy. Altered mental status.    TECHNIQUE: Multiple axial CT images of the head were obtained without   contrast. Sagittal and coronal reconstructed images were acquired from   the source data.    COMPARISON: Prior head CT study from 6/21/2022. Prior brain MRI study   from 6/20/2022.    FINDINGS: Extensive encephalomalacia and gliosis are again noted within   the high bilateral cerebral hemispheres.. Overall appearance is unchanged.    There is no acute intracranial hemorrhage, mass effect, shift of the   midline structures, herniation, hydrocephalus, abnormal extra-axial fluid   collection.    There is diffuse cerebral volume loss with prominence of the sulci,   fissures, and cisternal spaces which is normal for the patient's age.   There is moderate patchy confluent periventricular white matter   hypoattenuation statistically compatible with microvascular changes given   calcific atherosclerotic disease of the intracranial arteries.    The paranasal sinuses and mastoid air cells are clear. The calvarium   appears intact. There is evidence of bilateral cataract removal.    IMPRESSION:No acute intracranial hemorrhage.    Similar-appearing extensive encephalomalacia and gliosis within the   bilateral high cerebral hemispheres.    Similar-appearing moderate severity chronic white matter microvascular   type changes. Neurology Progress Note    SUBJECTIVE/OBJECTIVE/INTERVAL EVENTS:   Patient seen and examined at bedside. Has had agitation and episodes of cursing. Unable to provide further ROS or history.     MEDICATIONS  (STANDING):  amLODIPine   Tablet 10 milliGRAM(s) Oral <User Schedule>  atorvastatin 40 milliGRAM(s) Oral at bedtime  atovaquone  Suspension 1500 milliGRAM(s) Oral <User Schedule>  chlorhexidine 2% Cloths 1 Application(s) Topical <User Schedule>  doxazosin 4 milliGRAM(s) Oral daily  enoxaparin Injectable 60 milliGRAM(s) SubCutaneous every 12 hours  epoetin cortney-epbx (RETACRIT) Injectable 96239 Unit(s) SubCutaneous every 7 days  fluconAZOLE IVPB 200 milliGRAM(s) IV Intermittent every 24 hours  fosphenytoin IVPB 100 milliGRAM(s) PE IV Intermittent <User Schedule>  fosphenytoin IVPB 75 milliGRAM(s) PE IV Intermittent <User Schedule>  hydrALAZINE 100 milliGRAM(s) Oral every 8 hours  insulin lispro (ADMELOG) corrective regimen sliding scale   SubCutaneous every 6 hours  levothyroxine Injectable 40 MICROGram(s) IV Push at bedtime  melatonin 3 milliGRAM(s) Oral at bedtime  mycophenolate mofetil Suspension 1000 milliGRAM(s) Oral <User Schedule>  pantoprazole   Suspension 40 milliGRAM(s) Oral daily  perampanel 4 milliGRAM(s) Oral at bedtime  predniSONE  Solution 5 milliGRAM(s) Oral daily  sodium zirconium cyclosilicate 10 Gram(s) Oral daily  valGANciclovir 50 mG/mL Oral Solution 450 milliGRAM(s) Oral daily    MEDICATIONS  (PRN):  acetaminophen    Suspension .. 650 milliGRAM(s) Enteral Tube every 6 hours PRN Mild Pain (1 - 3)    VITALS & EXAMINATION:  Vital Signs Last 24 Hrs  T(C): 36.6 (06 Jul 2022 11:00), Max: 37.6 (05 Jul 2022 19:00)  T(F): 97.9 (06 Jul 2022 11:00), Max: 99.7 (05 Jul 2022 19:00)  HR: 59 (06 Jul 2022 13:00) (57 - 69)  BP: 151/65 (06 Jul 2022 13:00) (120/55 - 174/75)  BP(mean): 94 (06 Jul 2022 13:00) (79 - 116)  RR: 17 (06 Jul 2022 13:00) (16 - 33)  SpO2: 98% (06 Jul 2022 13:00) (88% - 99%)    General appearance: does not appear to be in pain  Head: normocephalic  Eyes: clear sclera  Extremities: no ezio LE edema  Respiratory: breathing regularly    Focused neurologic exam:  MS - awake, alert but perseverates and only follows simple commands rarely. Attend to stimuli from both sides.   CN - pupils equal round, EOMI, BTT b/l, no apparent facial asymmetry, hearing intact to conversation b/l   Motor - Normal bulk. Inc tone in L side and normal tone in R side. Spontaneous movements of L > R side. Grimaces, withdraws, and localizes to strong noxious in LUE 4+/5, LLE 4-/5, RUE 3+/5, RLE 2/5  Sens - LT/temp intact all, as above  Coord - PERRY  Gait and station - [facility]    LABORATORY:                          8.3    9.83  )-----------( 337      ( 06 Jul 2022 05:37 )             27.1     07-06    141  |  104  |  26<H>  ----------------------------<  116<H>  4.6   |  27  |  1.71<H>    Ca    8.5      06 Jul 2022 05:37  Phos  4.0     07-06  Mg     1.9     07-06    TPro  6.0  /  Alb  2.5<L>  /  TBili  0.3  /  DBili  0.1  /  AST  18  /  ALT  12  /  AlkPhos  150<H>  07-06    PT/INR - ( 06 Jul 2022 05:37 )   PT: 16.8 sec;   INR: 1.44 ratio      PTT - ( 06 Jul 2022 05:37 )  PTT:44.2 sec    Cerebrospinal Fluid Cell Count-1 (06.29.22 @ 13:18)   Total Nucleated Cell Count, CSF: 1 /uL   RBC Count - Spinal Fluid: 15 /uL   CSF Color: No Color   Tube Type: Tube 4   CSF Appearance: Clear   CSF Lymphocytes: 58 %   CSF Monocytes/Macrophages: 21 %   CSF Segmented Neutrophils: 21: Differential is based on _24 cells counted after cytocentrifugation. %   Glucose, CSF (06.29.22 @ 13:18)   Glucose, CSF: 125 mg/dL   Protein, CSF (06.29.22 @ 13:18)   Protein, CSF: 46 mg/dL   Lactate Dehydrogenase, CSF (06.29.22 @ 13:18)   Lactate Dehydrogenase, CSF: 37:  Cryptococcal Antigen - CSF (06.29.22 @ 13:18)   Cryptococcal Antigen - CSF: Negative:   CSF PCR Panel (06.29.22 @ 13:18)   CSF PCR Result: NotDetec:  Herpes Simplex Virus 1/2 PCR, CSF (06.29.22 @ 13:18)   Source HSV 1/2: CSF   HSV 1/2 PCR: NotDetec:     Phenytoin Level, Serum (07.06.22 @ 05:37)   Phenytoin Level, Serum: 18.3 ug/mL --> corrected 23.2    STUDIES & IMAGING: (EEG, CT, MR, U/S, TTE/DINAH):    < from: MR Head w/wo IV Cont (06.27.22 @ 15:23) >    ACC: [id number] EXAM:  MR BRAIN WAW IC                          PROCEDURE DATE:  06/27/2022      INTERPRETATION:  CLINICAL INDICATION: Altered mental status, seizures    Magnetic resonance imaging of the brain was carried out with transaxial   SPGR, FLAIR, fast spin echo T2 weighted images, axial susceptibility   weighted series, diffusion weighted series and sagittal T1 weighted   series on a 1.5 Anisa magnet. Post contrast axial, coronal and sagittal   T1 weighted images were obtained. 6 cc of Gadavist were intravenously   injected, 1.5 cc were discarded.    Comparison is made with the prior brain CT of 6/24/2022.    Mild to moderate atrophy is identified with ventricular and sulcal   prominence. Extensive small vessel white matter ischemic changes are   noted. There is extensive bifrontal parietal encephalomalacia and gliosis   involving the centrum semiovale ovale in the border zone regions. These   do not demonstrate diffusion restriction and demonstrate bright signal   intensity on T1-weighted images which does not lymphoma on susceptibility   weighted series suggesting laminar necrosis rather than old hemorrhage.   After contrast administration there is mild gyral enhancement in a small   portion of the frontal cortex. There is no evidence of cerebritis or   abscess.    IMPRESSION: Extensive bilateral frontal parietal gliosis and   encephalomalacia with minimal enhancement which may be related to   ischemic changes versus changes of hypertensive encephalopathy in a   patient with renal transplant on immunosuppressants. No evidence of   cerebritis or abscess.    --- End of Report ---    TIMOTHY GOMES MD; Attending Radiologist  This document has been electronically signed. Jun 27 2022  4:03PM    < end of copied text >      vEEG 6/28 -  EEG SUMMARY/CLASSIFICATION    Abnormal EEG in an altered / a sedated patient.  - Rare right frontal and left frontal sharp waves  - Patient events as described above  - Left frontotemporal slowing  - Mild to moderate generalized slowing    _____________________________________________________________  EEG IMPRESSION/CLINICAL CORRELATE    Abnormal EEG study.  Event of left leg shaking was not epileptic.  Potential epileptogenic foci in the right frontal and left frontal regions.  Structural or functional abnormality in the left frontotemporal region.  Mild to moderate nonspecific diffuse or multifocal cerebral dysfunction.   No seizure seen.      MRI brain w/ and w/o 6/27 - Extensive bilateral frontal parietal gliosis and encephalomalacia with minimal enhancement which may be related to ischemic changes versus changes of hypertensive encephalopathy in a patient with renal transplant on immunosuppressants. No evidence of cerebritis or abscess.      < from: CT Head No Cont (06.24.22 @ 10:29) >    ACC: [id number] EXAM:  CT BRAIN                          PROCEDURE DATE:  06/24/2022      INTERPRETATION:  .    CLINICAL INFORMATION: Worsening lethargy. Altered mental status.    TECHNIQUE: Multiple axial CT images of the head were obtained without   contrast. Sagittal and coronal reconstructed images were acquired from   the source data.    COMPARISON: Prior head CT study from 6/21/2022. Prior brain MRI study   from 6/20/2022.    FINDINGS: Extensive encephalomalacia and gliosis are again noted within   the high bilateral cerebral hemispheres.. Overall appearance is unchanged.    There is no acute intracranial hemorrhage, mass effect, shift of the   midline structures, herniation, hydrocephalus, abnormal extra-axial fluid   collection.    There is diffuse cerebral volume loss with prominence of the sulci,   fissures, and cisternal spaces which is normal for the patient's age.   There is moderate patchy confluent periventricular white matter   hypoattenuation statistically compatible with microvascular changes given   calcific atherosclerotic disease of the intracranial arteries.    The paranasal sinuses and mastoid air cells are clear. The calvarium   appears intact. There is evidence of bilateral cataract removal.    IMPRESSION:No acute intracranial hemorrhage.    Similar-appearing extensive encephalomalacia and gliosis within the   bilateral high cerebral hemispheres.    Similar-appearing moderate severity chronic white matter microvascular   type changes.

## 2022-07-07 NOTE — PROGRESS NOTE ADULT - SUBJECTIVE AND OBJECTIVE BOX
Transplant Surgery Multidisciplinary Progress Note   --------------------------------------------------------------  R DCD DDRT    4/21/22    Present: Patient seen and examined with multidisciplinary team including Transplant Surgeon: Dr. Barber,  Nephrologist: Dr. Mary Lomeli/Sanjana, Pharmacist: WALDO Lim, Dr. Bhakta ([facility]), [facility], [facility] and SICU team during am rounds.  Disciplines not in attendance will be notified of the plan.     HPI: 66M with PMH: DM type II, HTN, CAD s/p CABG in 2020, AFib on Eliquis, CVA in 2019 due to Afib, Seizure d/o following CVA, last episode was on 4/8/22, h/o GIB in 2/2022 EGD with duodenal ulcer.  ESRD due to DM was on HD since 2019.     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  Donor was 58 , KDPI 82%, DCD, single vessels and ureter, HLA mismatch 1, 2, 2. No DSA, cPRA 0%. 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 Re-admitted in May for anemia in the setting of large perinephric hematoma s/p evacuation and repair of arterial anastomosis on 5/2/22. Intra operative biopsy showed no rejection, Creatinine ranging ~2mg/dL.    In Rehab: He was recently dx with klebsiella UTI rx with ertapenem - then switched to Levaquin day #6.    He also had parainfluenza pneumonia. Was at rehab progressing well.      Hospital course:  - 6/10: transferred from rehab facility for seizure status epilepticus. Intubated for respiratory protection and transferred to MICU.  EEG showed no seizure activity. Neuro consulted.   - 6/11: Extubated. Found to have R pleural effusion. s/p Thoracentesis 1.3L. Eliquis changed to heparin drip.  Post procedure drop in H&H. 5u PRBC, 2 u FFP.    - 6/11 evening: Transferred to SICU for further care in setting of hypoxia, significant R pleural effusion, hgb 6 and hemodynamic instability  - 6/11 Intubated at 9pm and sedated on Precedex.  CT placed, 600ml blood drained.  1L total overnight, started pressors  - 6/11 Received Protamine for PTT >100 (was on Heparin drip started for AF), 2 u FFP and 5u PRBC total  - 6/13 s/p VATS 2.2L old blood removed; second chest tube placed  - 6/18-19: chest tubes removed  - 6/21: ?PRES on MRI, off Envarsus, switched to Belatacept 6/23  - 6/25: Tx to SICU for refractory seizures    Interval Events:  - Afebrile, VSS  - more lethargic this AM  - periods of agitation /24H requiring Haldol (last yesterday afternoon)  - EEG without seizures overnight  - tolerating TFs at goal      Immunosuppression:   Induction:  Thymoglobulin                                        Maintenance:  - Belatacept--will re-load this Friday (gap between initial loading doses 2/2 seizures)  - MMF to 1g BID  - continue Pred 5  -Ongoing monitoring for signs of rejection.    Potential Discharge date: pending clinical improvement  Education:  Medications  Plan of care:  See Below      MEDICATIONS  (STANDING):  amLODIPine   Tablet 10 milliGRAM(s) Oral <User Schedule>  apixaban 5 milliGRAM(s) Oral every 12 hours  atorvastatin 40 milliGRAM(s) Oral at bedtime  atovaquone  Suspension 1500 milliGRAM(s) Oral <User Schedule>  chlorhexidine 2% Cloths 1 Application(s) Topical <User Schedule>  doxazosin 4 milliGRAM(s) Oral daily  epoetin cortney-epbx (RETACRIT) Injectable 50947 Unit(s) SubCutaneous every 7 days  finasteride 5 milliGRAM(s) Oral daily  fosphenytoin IVPB 100 milliGRAM(s) PE IV Intermittent every 12 hours  hydrALAZINE 100 milliGRAM(s) Oral every 8 hours  insulin lispro (ADMELOG) corrective regimen sliding scale   SubCutaneous every 6 hours  levothyroxine Injectable 40 MICROGram(s) IV Push at bedtime  melatonin 6 milliGRAM(s) Oral at bedtime  mycophenolate mofetil Suspension 1000 milliGRAM(s) Enteral Tube <User Schedule>  pantoprazole   Suspension 40 milliGRAM(s) Oral daily  polyethylene glycol 3350 17 Gram(s) Oral daily  predniSONE  Solution 5 milliGRAM(s) Oral daily  sodium zirconium cyclosilicate 10 Gram(s) Oral daily  valGANciclovir 50 mG/mL Oral Solution 450 milliGRAM(s) Oral daily    MEDICATIONS  (PRN):  acetaminophen    Suspension .. 650 milliGRAM(s) Enteral Tube every 6 hours PRN Mild Pain (1 - 3)              Vital Signs Last 24 Hrs  T(C): 36 (07 Jul 2022 15:00), Max: 36.6 (07 Jul 2022 07:00)  T(F): 96.8 (07 Jul 2022 15:00), Max: 97.9 (07 Jul 2022 07:00)  HR: 57 (07 Jul 2022 16:00) (50 - 60)  BP: 129/81 (07 Jul 2022 16:00) (124/87 - 169/74)  BP(mean): 98 (07 Jul 2022 16:00) (87 - 106)  RR: 14 (07 Jul 2022 16:00) (12 - 20)  SpO2: 100% (07 Jul 2022 16:00) (94% - 100%)    I&O's Summary    06 Jul 2022 07:01  -  07 Jul 2022 07:00  --------------------------------------------------------  IN: 1600 mL / OUT: 970 mL / NET: 630 mL    07 Jul 2022 07:01  -  07 Jul 2022 16:13  --------------------------------------------------------  IN: 470 mL / OUT: 245 mL / NET: 225 mL                              7.9    7.91  )-----------( 276      ( 07 Jul 2022 06:22 )             26.0     07-07    142  |  104  |  25<H>  ----------------------------<  99  4.2   |  25  |  1.56<H>    Ca    8.5      07 Jul 2022 15:43  Phos  3.4     07-07  Mg     2.3     07-07    TPro  5.4<L>  /  Alb  2.3<L>  /  TBili  0.2  /  DBili  <0.1  /  AST  13  /  ALT  12  /  AlkPhos  138<H>  07-07                        REVIEW OF SYSTEMS  --------------------------------------------------------------------------------  unable to assess, +delirium        PHYSICAL EXAM:  Constitutional: Well developed / well nourished  Eyes: Anicteric, PERRLA  ENMT: nc/at  Neck: supple  Respiratory: CTA   Cardiovascular: RRR  Gastrointestinal: Soft, non distended, NT, incision healed   Genitourinary: voiding via tucker  Extremities: SCD's in place and working bilaterally, LUE pitting edema, RUE with improvement  Vascular: Palpable dp pulses bilaterally  Neurological: lethargic  Skin: no rashes, ulcerations or lesions  Musculoskeletal: Moving all extremities, global weakness  Psychiatric: calm, periods of agitation

## 2022-07-07 NOTE — PROGRESS NOTE ADULT - ATTENDING COMMENTS
1. Altered mental status and agitation, recent status epilepticus, seizure disorder:  - Home meds: divalproex 750 qam/ 500 qpm, Keppra 250mg bid  - No further seizures  - Currently on fosphenytoin and perampanel - Neuro recommending to d/c perampanel today, fosphenytoin dose has been decreased    - Given AMS, pt has had feeding tube placed. Per pt's family, patient would not want PEG. Will continue tube feeds for now until mental status improves. Tolerating tube feeds at goal, good bowel function. Continue bowel regimen.    2. ESRD s/p renal transplant 4/21:  - Home meds: cellcept 500mg bid, envarsus XR 3mg daily, prednisone 5mg daily, fluconazole 200mg daily, bactrim 400mg/80mg daily, valcyte 450mg three times per week  - Hyperkalemia on lokelma, monitor serial BMPs    3. Essential HTN:  - Home meds: norvasc 10mg daily, coreg 6.25mg bid  - Continue norvasc  - Coreg being held for bradycardia during this admission  - Hydralazine added for better BP control    4. Paroxysmal atrial fibrillation with h/o CVA:  - Home meds: Eliquis 2.5mg bid, coreg 6.25mg bid  - Currently on therapeutic lovenox    5. HLD:  - Home meds: atorvastatin 40mg  - Continue    6. DM type 2 on long-term insulin, with highly variable fsg during this admission:  - Home meds: Humalog 5 units qac, glargine 6units qhs  - HbA1c 6%  - Monitor fsg for fluctuations, especially when having to hold tube feeds.     7. GERD:  - Home meds: protonix 40mg daily    8. Acquired hypothyroidism:  - Home meds: Synthroid 50mcg daily

## 2022-07-07 NOTE — PROGRESS NOTE ADULT - ASSESSMENT
66M with h/o DM type II, HTN, CAD s/p CABG in 2020, Afib on Eliquis, CVA in 2019 due to Afib, Seizure d/o (last episode was on 4/8/22), h/o GI bleed in 2/2022 EGD with duodenal ulcer and ESRD on HD s/p R DDRT from DCD donor on 4/21/22 complicated by DGF requiring HD until 4/29/22 now with good graft function who presented with status epilepticus in setting of UTI/antibiotics and sub-therapeutic valproic acid level     Seizure Disorder  - repeat MRI head 6/27 with less concerns for PRES, likely ischemic changes  - remains seizure free  - Antiepileptic regimen per Neurology, please follow recs daily.  start EEG as instructed  - Keep Mag > 2  - infectious w/u negative so far, off ABX.  Remains on Fluc.  Transplant ID following, appreciate recs    R DCD DDRT (ureter stented) 4/21/22  - renal function stable  - Strict I/Os, continue tucker. Add Proscar. TOV as a ble  - TFs off, NGT to [facility] with meds via [facility]  - No PEG per discussion with son   - STENT REMOVAL this admission- seen in RLQ on AXR 7/5    Hyperkalemia:   - Trend labs  - Lokelma 10g daily   - Consider Florinef if persistent hyperkalemia     Immunosuppression:   - Belatacept--will re-load this Friday (gap between initial loading doses 2/2 seizures)  - MMF to 1g BID  - continue Pred 5  - PPx:  Mepron/Valcyte/Fluc/PPI    DM  - on Lantus/Lispro, Endocrine following     AFib:  - continue Retacrit weekly  - would switch to Eliquis as able  - rate controlled    R IJ DVT  - would switch to Eliquis as able    HTN:  - continue Norvasc/Hydralazine/Cardura

## 2022-07-07 NOTE — PROGRESS NOTE ADULT - NUTRITIONAL ASSESSMENT
This patient has been assessed with a concern for Malnutrition and has been determined to have a diagnosis/diagnoses of Severe protein-calorie malnutrition.    This patient is being managed with:   Diet NPO with Tube Feed-  Tube Feeding Modality: Nasogastric  Nepro with Carb Steady (NEPRORTH)  Total Volume for 24 Hours (mL): 960  Continuous  Starting Tube Feed Rate {mL per Hour}: 20  Increase Tube Feed Rate by (mL): 10     Every 4 hours  Until Goal Tube Feed Rate (mL per Hour): 40  Tube Feed Duration (in Hours): 24  Tube Feed Start Time: 07:00  Entered: Jul 6 2022 11:20AM     Diet, NPO with Tube Feed:   Tube Feeding Modality: Nasogastric  Nepro with Carb Steady (NEPRORTH)  Total Volume for 24 Hours (mL): 960  Continuous  Starting Tube Feed Rate {mL per Hour}: 20  Increase Tube Feed Rate by (mL): 10     Every 4 hours  Until Goal Tube Feed Rate (mL per Hour): 40  Tube Feed Duration (in Hours): 24  Tube Feed Start Time: 07:00 (07-06-22 @ 11:20) [Active]

## 2022-07-07 NOTE — PROGRESS NOTE ADULT - ASSESSMENT
66 yr old M with Type 2 DM> unknown control due to skewed A1C 6.6% due to chronic anemia and s/p blood tx. On basal/bolus insulin therapy PTA. DM c/b ESRD s/p DDRT on 3/21, CVA, CAD s/p CABG, Afib on apixaban, seizure activity, HTN, HLD, h/o GI bleed in 2/2022 EGD with duodenal ulcer, discharged to [facility] rehab center after prior hospitalization, now re-admitted from [facility] for seizures c/f status epilepticus in setting of UTI. endocrine consulted for steroid induced hyperglycemia, now on home dose prednisone 5mg. Prolonged hospital course w/ ICU stay, previously intubated/extubated, previous seizures.   TF Stopped 2/2 vomiting 2/2 ileus.        Type 2 diabetes mellitus with hypoglycemia, with long-term current use of insulin.   NPO 2/2 ileus  FS q6h  BG Goal 100-180mg/dl   c/w LDSS q6h    if BG remains >180 while TF at goal, recommend restarting NPH 2 units q8h 6am 2pm and 10pm. HOLD IF TFS ARE OFF  and change to  Admelog low dose correction scale q8h 6am 2pm and 10pm  Low correction scale q6h  Previously while on tf was on LDSS and NPH 2units q8h   -Please inform endo team of any changes on steroid therapy or PO/TF status    Discharge  - Likely discharge on basal bolus insulin, Plan TBD based on insulin requirements at NH.   Outpatient f/u with Endocrinologist Dr. Lincoln. [address] [facility] [address] [address]. Phone  Needs apt at time of discharge  -Needs optho/renal/cardiac f/u as out pt  -Make sure pt has insulin sand DM supplies at time of discharge.     Problem/Plan - 2:  ·  Problem: HTN (hypertension).   ·  Plan: BP goal and management per primary team/neuro.     Problem/Plan - 3:  ·  Problem: Hypothyroidism.   ·  Plan: -C/w levothyroxine Injectable 40 MICROGram(s) IV Push at bedtime  -Once taking POs can restart LT4 50 mcg daily on an empty stomach at least 1 hour apart from from food or PO meds  - monitor TFTs outpatient.     Problem/Plan - 4:  ·  Problem: HLD (hyperlipidemia).   ·  Plan: -C/w atorvastatin 40 milliGRAM(s) Oral at bedtime  -F/u lipids as out pt.    Discussed with patient and primary  team Kendra HAAS  Contact via [employer] Teams during business hours  On evenings and weekends, please call [phone] or page endocrine fellow on call.   Please note that this patient may be followed by different provider tomorrow.    Time spent on encounter: 26 minutes spent on total encounter; The necessity of the time spent during the encounter on this date of service was due to development of plan of care/coordination of care/glycemic control through review of labs, blood glucose values and vital signs.  66 yr old M with Type 2 DM> unknown control due to skewed A1C 6.6% due to chronic anemia and s/p blood tx. On basal/bolus insulin therapy PTA. DM c/b ESRD s/p DDRT on 3/21, CVA, CAD s/p CABG, Afib on apixaban, seizure activity, HTN, HLD, h/o GI bleed in 2/2022 EGD with duodenal ulcer, discharged to [facility] rehab center after prior hospitalization, now re-admitted from [facility] for seizures c/f status epilepticus in setting of UTI. endocrine consulted for steroid induced hyperglycemia, now on home dose prednisone 5mg. Prolonged hospital course w/ ICU stay, previously intubated/extubated, previous seizures.   TF Stopped 2/2 vomiting 2/2 ileus. now w/ TF restarted at goal rate        Type 2 diabetes mellitus with hypoglycemia, with long-term current use of insulin.   on 24H Glucerna TF   BG Goal 100-180mg/dl   FS q6h  c/w LDSS q6h    if BG remains >180 while TF at goal, recommend restarting NPH 2 units q8h 6am 2pm and 10pm. HOLD IF TFS ARE OFF  and change to  Admelog low dose correction scale q8h 6am 2pm and 10pm  Low correction scale q6h  -Please inform endo team of any changes on steroid therapy or PO/TF status    Discharge  - Likely discharge on basal bolus insulin, Plan TBD based on insulin requirements at [place].   Outpatient f/u with Endocrinologist Dr. Lincoln. [address] [facility] [address]. Phone  Needs apt at time of discharge  -Needs optho/renal/cardiac f/u as out pt  -Make sure pt has insulin sand DM supplies at time of discharge.     Problem/Plan - 2:  ·  Problem: HTN (hypertension).   ·  Plan: BP goal and management per primary team/neuro.     Problem/Plan - 3:  ·  Problem: Hypothyroidism.   ·  Plan: -C/w levothyroxine Injectable 40 MICROGram(s) IV Push at bedtime  -Once taking POs can restart LT4 50 mcg daily on an empty stomach at least 1 hour apart from from food or PO meds  - monitor TFTs outpatient.     Problem/Plan - 4:  ·  Problem: HLD (hyperlipidemia).   ·  Plan: -C/w atorvastatin 40 milliGRAM(s) Oral at bedtime  -F/u lipids as out pt.    Discussed with patient and primary  team SICU provider   Contact via [employer] Teams during business hours  On evenings and weekends, please call [phone] or page endocrine fellow on call.   Please note that this patient may be followed by different provider tomorrow.    Time spent on encounter: 26 minutes spent on total encounter; The necessity of the time spent during the encounter on this date of service was due to development of plan of care/coordination of care/glycemic control through review of labs, blood glucose values and vital signs.

## 2022-07-07 NOTE — PROGRESS NOTE ADULT - SUBJECTIVE AND OBJECTIVE BOX
[facility] [place]   MD NINA MASON MD, DO SADIE RAMIREZ NP    TEL:  OFFICE: [phone]    From 5pm-7am Answering Service [phone]    -- RENAL FOLLOW UP NOTE ---Date of Service 07-07-22 @ 14:29    Patient is a 66y old  Male who presents with a chief complaint of Status Epilepticus (07 Jul 2022 11:40)      Patient seen and examined in ICU.      VITALS:  T(F): 97 (07-07-22 @ 11:00), Max: 97.9 (07-06-22 @ 15:00)  HR: 53 (07-07-22 @ 13:00)  BP: 149/68 (07-07-22 @ 13:00)  RR: 15 (07-07-22 @ 13:00)  SpO2: 97% (07-07-22 @ 13:00)  Wt(kg): --    07-06 @ 07:01  -  07-07 @ 07:00  --------------------------------------------------------  IN: 1600 mL / OUT: 970 mL / NET: 630 mL    07-07 @ 07:01  -  07-07 @ 14:29  --------------------------------------------------------  IN: 320 mL / OUT: 145 mL / NET: 175 mL          PHYSICAL EXAM:  Constitutional: NAD  Neck: No JVD  Respiratory: CTAB, no wheezes, rales or rhonchi  Cardiovascular: S1, S2, RRR  Gastrointestinal: BS+, soft, NT/ND  Extremities: No peripheral edema    Hospital Medications:   MEDICATIONS  (STANDING):  amLODIPine   Tablet 10 milliGRAM(s) Oral <User Schedule>  apixaban 5 milliGRAM(s) Oral every 12 hours  atorvastatin 40 milliGRAM(s) Oral at bedtime  atovaquone  Suspension 1500 milliGRAM(s) Oral <User Schedule>  chlorhexidine 2% Cloths 1 Application(s) Topical <User Schedule>  doxazosin 4 milliGRAM(s) Oral daily  epoetin cortney-epbx (RETACRIT) Injectable 96122 Unit(s) SubCutaneous every 7 days  finasteride 5 milliGRAM(s) Oral daily  fosphenytoin IVPB 100 milliGRAM(s) PE IV Intermittent every 12 hours  hydrALAZINE 100 milliGRAM(s) Oral every 8 hours  insulin lispro (ADMELOG) corrective regimen sliding scale   SubCutaneous every 6 hours  levothyroxine Injectable 40 MICROGram(s) IV Push at bedtime  melatonin 6 milliGRAM(s) Oral at bedtime  mycophenolate mofetil Suspension 1000 milliGRAM(s) Enteral Tube <User Schedule>  pantoprazole   Suspension 40 milliGRAM(s) Oral daily  polyethylene glycol 3350 17 Gram(s) Oral daily  predniSONE  Solution 5 milliGRAM(s) Oral daily  sodium zirconium cyclosilicate 10 Gram(s) Oral daily  valGANciclovir 50 mG/mL Oral Solution 450 milliGRAM(s) Oral daily      LABS:  07-07    139  |  104  |  24<H>  ----------------------------<  203<H>  4.4   |  26  |  1.55<H>    Ca    8.2<L>      07 Jul 2022 06:17  Phos  3.7     07-07  Mg     2.2     07-07    TPro  5.4<L>  /  Alb  2.3<L>  /  TBili  0.2  /  DBili  <0.1  /  AST  13  /  ALT  12  /  AlkPhos  138<H>  07-07    Creatinine Trend: 1.55 <--, 1.53 <--, 1.61 <--, 1.71 <--, 1.69 <--, 1.66 <--, 1.67 <--, 1.60 <--, 1.65 <--, 1.52 <--, 1.52 <--, 1.50 <--, 1.51 <--, 1.49 <--, 1.49 <--, 1.45 <--, 1.45 <--, 1.48 <--, 1.42 <--, 1.50 <--, 1.47 <--, 1.52 <--    Albumin, Serum: 2.3 g/dL (07-07 @ 06:17)  Phosphorus Level, Serum: 3.7 mg/dL (07-07 @ 06:17)  Phosphorus Level, Serum: 4.2 mg/dL (07-06 @ 22:55)  Phosphorus Level, Serum: 3.8 mg/dL (07-06 @ 15:36)                              7.9    7.91  )-----------( 276      ( 07 Jul 2022 06:22 )             26.0     Urine Studies:  Urinalysis - [07-03-22 @ 14:32]      Color Light Yellow / Appearance Clear / SG 1.018 / pH 8.0      Gluc Negative / Ketone Negative  / Bili Negative / Urobili Negative       Blood Negative / Protein Trace / Leuk Est Negative / Nitrite Negative      RBC 1 / WBC 2 / Hyaline 0 / Gran  / Sq Epi  / Non Sq Epi 1 / Bacteria Negative      Iron 17, TIBC 171, %sat 10      [05-02-22 @ 13:03]  Ferritin 1505      [05-02-22 @ 13:05]  PTH -- (Ca 9.2)      [02-05-22 @ 10:13]   294  HbA1c 6.0      [02-21-20 @ 08:53]  TSH 4.69      [07-06-22 @ 18:36]      Syphilis Screen (Treponema Pallidum Ab) Negative      [06-27-22 @ 02:00]    RADIOLOGY & ADDITIONAL STUDIES:

## 2022-07-07 NOTE — CHART NOTE - NSCHARTNOTEFT_GEN_A_CORE
Nutrition Follow Up Note  Patient seen for: malnutrition follow up    Chart reviewed, events noted. "65 y/o male w/ a PMHx of CAD s/p CABG, AF on Eliquis c/b CVA c/b seizures, HTN, HLD, DM type II, hypothyroidism, GI bleed due to a duodenal ulcer, and ESRD s/p DDRT c/b DGF requiring HD & large perinephric hematoma s/p evacuation w/ revision of arterial anastomosis who presented in status epilepticus requiring intubation for airway protection with a hospital course c/b large right pleural effusion s/p thoracentesis c/b hemothorax while being anticoagulated & requiring intubation for airway protection s/p chest tube placement w/ CT demonstrating retained hemothorax s/p right VATS, status epilepticus again causing AMS requiring intubation for airway protection again, delirium, dysphagia requiring NGT placement for enteral access, hyperkalemia, and poor glycemic control."    Source: [] Patient       [x] EMR        [X] RN        [] Family at bedside       [X] Other: William speaking; pt noted with agitation/delirium; unable to participate      Diet Order:   Diet, NPO with Tube Feed:   Tube Feeding Modality: Nasogastric  Nepro with Carb Steady (NEPRORTH)  Total Volume for 24 Hours (mL): 960  Continuous  Starting Tube Feed Rate {mL per Hour}: 20  Increase Tube Feed Rate by (mL): 10     Every 4 hours  Until Goal Tube Feed Rate (mL per Hour): 40  Tube Feed Duration (in Hours): 24  Tube Feed Start Time: 07:00 (22)    EN Order Provides: 960 ml formula, 1728 calories (28 kcal/kg), 78 grams protein (~1.3 gm/kg), 698 ml free water; based on dosing wt 61.7kg    Current Pump Rate: 40 ml/hr  EN provision: Nepro infusing at goal as of     Is current diet order appropriate/adequate? [X] Yes  []  No:     Diet History:  6/10-: NPO  -: PO diet  : EN initiated and provided @ goal: Glucerna1.5 @ 50 ml/hr (1800kcal, 99g protein)   -: EN held in setting of emesis; concern for ileus  : EN changed to Nepro @ 40 ml/hr in setting of ANA/hyperkalemia    Nutrition-related concerns:  - Dysphagia; EN dependent as of 22. Possible plan for PEG placement, pending family decision.  - h/o DDRT 22.   - ANA/Hyperkalemia addressed with Lokelma (last ordered , ), and EN formula change to Nepro  - Hyperglycemia/DM2 addressed with SSI q6 hrs; hypoglycemia noted     GI:  Last BM , .   Bowel Regimen? [X] Yes: Miralax  Last emesis:   Ileus resolved, per chart.    Weights:   Daily Weight in k.9 (), Weight in k.2 (), Weight in k.6 ()  DOSIN.7kg    MEDICATIONS  (STANDING):  amLODIPine   Tablet  atorvastatin  atovaquone  Suspension  doxazosin  fluconAZOLE IVPB  hydrALAZINE  insulin lispro (ADMELOG) corrective regimen sliding scale  levothyroxine Injectable  pantoprazole   Suspension  polyethylene glycol 3350  predniSONE  Solution  valGANciclovir 50 mG/mL Oral Solution    Pertinent Labs:  @ 06:17: Na 139, BUN 24<H>, Cr 1.55<H>, <H>, K+ 4.4, Phos 3.7, Mg 2.2, Alk Phos 138<H>, ALT/SGPT 12, AST/SGOT 13,   @ 22:55: Na 139, BUN 24<H>, Cr 1.53<H>, <H>, K+ 5.0, Phos 4.2, Mg 2.3,   @ 15:36: Na 138, BUN 24<H>, Cr 1.61<H>, <H>, K+ 4.9, Phos 3.8, Mg 2.3,     A1C with Estimated Average Glucose Result: 6.0 % (22 @ 05:49)  A1C with Estimated Average Glucose Result: 6.6 % (22 @ 17:12)  A1C with Estimated Average Glucose Result: 7.3 % (22 @ 13:42)  A1C with Estimated Average Glucose Result: 7.2 % (22 @ 05:48)    Finger Sticks:  POCT Blood Glucose.: 218 mg/dL ( @ 08:26)  POCT Blood Glucose.: 145 mg/dL ( @ 02:01)  POCT Blood Glucose.: 164 mg/dL ( @ 21:36)  POCT Blood Glucose.: 165 mg/dL ( @ 15:21)  POCT Blood Glucose.: 136 mg/dL ( @ 08:44)    Skin per nursing documentation: stage II sacrum, suspected DTI b/l buttocks  Edema: 3+ left/right arm    Estimated Nutrition Needs: based on dosing wt 61.7 kg   Energy Needs: 1543 - 1851 kcal (25-30 kcal/kg)  Protein Needs: 74 - 99 grams (1.2-1.6 g/kg)  Fluid Needs: defer to team in setting of ANA    Previous Nutrition Diagnosis: 1) Severe Malnutrition, 2) Increased Nutrient Needs  Nutrition Diagnosis is: [X] ongoing; addressed with EN at goal    New Nutrition Diagnosis: [X] Not applicable    Nutrition Care Plan:  [X] In Progress  [] Achieved  [] Not applicable    Nutrition Interventions:     Education Provided:       [] Yes:  [X] No: not appropriate at this time; no family at bedside       Recommendations:         [X] Continue current diet order: Nepro @ 40 ml/hr x 24 hrs to provide 960 ml formula, 1728 calories (28 kcal/kg), 78 grams protein (~1.3 gm/kg), 698 ml free water; based on dosing wt 61.7kg     [X] Add micronutrient supplementation: consider Nephro-macie and vitamin C daily to support wound healing if medically feasible.    Monitoring and Evaluation:   Continue to monitor nutritional intake, tolerance to diet prescription, weights, labs, skin integrity      RD remains available upon request and will follow up per protocol  Wendie Lazaro, MS RD CDN [facility] (Pager #[phone])

## 2022-07-07 NOTE — PROGRESS NOTE ADULT - PROBLEM SELECTOR PLAN 3
likely from bactrim  Resolved            If you have any questions, please feel free to contact me  Steffi Cali  Nephrology Fellow  Pager NS: [phone]/ [facility]: [phone]    (After 5 pm or on weekends please page the on-call fellow, can check [email or web address] for schedule. Login is jesus cristobal, schedule under [facility] medicine, psych, derm)

## 2022-07-07 NOTE — PROGRESS NOTE ADULT - ASSESSMENT
66 yr old Male with PMHx ESRD s/p permacath removal 5/10/22 s/p Rt DDRT 4/21/22,  CVA (2019), Afib on apixaban, seizure activity, CAD s/p stents, CABG (2020), HTN, HLD, DM on insulin, gastric/duodenal ulcer, recent hospitalization 4/28/22 -5/10/22 with weakness/anemia found to have perinephric hematoma requiring evacuation and repair of bleeding arterial anastomosis. Curently being treated for UTI with Levaquin Today after developing multiple sz episodes refractory to 5 mg versed IM (EMS) and 2 mg ativan IV in E.D. concerning for status epilepticus requiring intubation for hypoxic respiratory  failure. MICU Consult was called for hypoxic respiratory failure secondary to status epilepticus.  Nephrology consulted for renal failure.     A/P  DDRT on 04/21/22  Course complicated by DGF, was on HD until 4/29/22. Readmitted in May for anemia in the setting of large perinephric hematoma s/p evacuation and repair of arterial anastomosis on 5/2/22. Intra operative biopsy showed no rejection.  ANA likely sec to  hemodynamic change   scr remains relatively stable  optimize glucose   monitor BMP, U/O     Hyperkalemia   resolved   monitor K closely     HTN   optimal   monitor BP closely     Immunosuppression  continue per transplant team

## 2022-07-07 NOTE — PROGRESS NOTE ADULT - ATTENDING COMMENTS
66 year old male with PMH of CAD s/p CABG in 2020, AF on Eliquis c/b CVA in 2019 c/b seizures, HTN, HLD, DM type II, hypothyroidism, GI bleed due to a duodenal ulcer (2/2022), and ESRD s/p DDRT (4/21/22) c/b DGF requiring HD (last session 4/29/22) admitted with  status epilepticus    1.DCD DDRT on 4/21/220- Allograft function is stable   2.IS meds- on Belatacept  (concern for PRES & seizures) Last dose was on 7/4. on   mg BID and  Prednisone 5 mg po daily   3.Hyperkalemia- resolved on Lokelma, bactrim was d/c and switched  to mepron  4. Altered mentation- agitated last night, on restraints . Neurology is following. on vEEG and AED management

## 2022-07-07 NOTE — PROGRESS NOTE ADULT - ASSESSMENT
65 y/o male w/ a PMHx of CAD s/p CABG, AF on Eliquis c/b CVA c/b seizures, HTN, HLD, DM type II, hypothyroidism, GI bleed due to a duodenal ulcer, and ESRD s/p DDRT c/b DGF requiring HD & large perinephric hematoma s/p evacuation w/ revision of arterial anastomosis who presented in status epilepticus requiring intubation for airway protection with a hospital course c/b large right pleural effusion s/p thoracentesis c/b hemothorax while being anticoagulated & requiring intubation for airway protection s/p chest tube placement w/ CT demonstrating retained hemothorax s/p right VATS, status epilepticus again causing AMS requiring intubation for airway protection again, delirium, dysphagia requiring NGT placement for enteral access, hyperkalemia, and poor glycemic control.    PLAN:  Neuro: 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 c/b status epilepticus, delirium  - Assess neurological status every 4 hours in the setting of seizures  - Fosphenytoin w/ daily levels & perampanel for seizures  - vEEG restarted as per Neuro, will adjust AE regimen in the AM as per Neuro pending overnight vEEG   - no documented seizures since 6/24, MRI negative, and infx workup/LP negative to date   - f/u Vitamin B12, folate, Vitamin B1, methylmalonic acid, homocysteine, TSH, T3/T4 levels   -given haldol 2.5x1 for agitation/ delirium   -melatonin qhs added   - Pain control if needed w/ acetaminophen    Resp: intubated for airway protection x3 (resolved), large right pleural effusion s/p thoracentesis c/b hemothorax while being anticoagulated s/p chest tube placement c/b retained hemothorax s/p right VATS  - Out of bed to chair, incentive spirometry, and aggressive chest PT to prevent atelectasis    CV: CAD, AF, HTN  - HD stable off pressors  - Amlodipine and hydralazine for HTN  - Continue atorvastatin    GI: ileus, h/o dysphagia  - +BM today, ileus resolved  - TF restarted   - cont bowel regimen   - Patient's family appears to be agreeable to PEG but given patient's agitation, high risk for inadvertent removal so will need to reassess w/ family  - Protonix for stress ulcer prophylaxis    Renal: hyperkalemia; s/p DDRT, urinary retention  - Immunosuppression as per transplant surgery: currently on MMF, Belatacept & steroids  - Doxazosin increased from 2-->4 for urinary retention  - IV locked  - failed TOV. Miranda replaced overnight. Now making adequate UO.  -Crn improving   -hyperK resolved. lokelma d/darren     Heme: anemia of chronic diseases  - Monitor CBC and coags  - On therapeutic Lovenox instead of Eliquis for possible PEG placement   - Epogen for anemia of chronic diseases    ID: UTI, immunosuppression  - Monitor WBC, temperature, and procalcitonin  - Fluconazole, Valcyte, atovaquone for immunosuppression prophylaxis  - Holding Bactrim for hyperkalemia    Endo: DM type II, hypothyroidism  - Monitor glucose w/ ISS q6hrs for glycemic control; HgbA1C 6.6% on 4/24  - Synthroid for hypothyroidism

## 2022-07-07 NOTE — PROGRESS NOTE ADULT - SUBJECTIVE AND OBJECTIVE BOX
DIABETES FOLLOW UP : Seen earlier today    INTERVAL HX: tf restarted, running at goal, pt somnolent unable to complete ROS , one hyperglycemic value in past 24 hours       Review of Systems:      Allergies    No Known Allergies    Intolerances      MEDICATIONS  (STANDING):  amLODIPine   Tablet 10 milliGRAM(s) Oral <User Schedule>  atorvastatin 40 milliGRAM(s) Oral at bedtime  atovaquone  Suspension 1500 milliGRAM(s) Oral <User Schedule>  chlorhexidine 2% Cloths 1 Application(s) Topical <User Schedule>  doxazosin 4 milliGRAM(s) Oral daily  enoxaparin Injectable 60 milliGRAM(s) SubCutaneous every 12 hours  epoetin cortney-epbx (RETACRIT) Injectable 84640 Unit(s) SubCutaneous every 7 days  fluconAZOLE IVPB 200 milliGRAM(s) IV Intermittent every 24 hours  fosphenytoin IVPB 100 milliGRAM(s) PE IV Intermittent every 12 hours  hydrALAZINE 100 milliGRAM(s) Oral every 8 hours  insulin lispro (ADMELOG) corrective regimen sliding scale   SubCutaneous every 6 hours  levothyroxine Injectable 40 MICROGram(s) IV Push at bedtime  melatonin 6 milliGRAM(s) Oral at bedtime  mycophenolate mofetil Suspension 1000 milliGRAM(s) Enteral Tube <User Schedule>  pantoprazole   Suspension 40 milliGRAM(s) Oral daily  polyethylene glycol 3350 17 Gram(s) Oral daily  predniSONE  Solution 5 milliGRAM(s) Oral daily  sodium zirconium cyclosilicate 10 Gram(s) Oral daily  valGANciclovir 50 mG/mL Oral Solution 450 milliGRAM(s) Oral daily      PHYSICAL EXAM:  VITALS: T(C): 36.6 (07-07-22 @ 07:00)  T(F): 97.9 (07-07-22 @ 07:00), Max: 97.9 (07-06-22 @ 11:00)  HR: 50 (07-07-22 @ 10:00) (50 - 62)  BP: 136/62 (07-07-22 @ 10:00) (131/61 - 169/74)  RR:  (12 - 25)  SpO2:  (88% - 100%)  Wt(kg): --  GENERAL: male laying in bed in NAD on vEEG , NGT w/ TF running, bilateral wrist restraints   Respiratory: Respirations unlabored   Extremities: Warm  NEURO: solmnolent     LABS:  POCT Blood Glucose.: 218 mg/dL (07-07-22 @ 08:26)  POCT Blood Glucose.: 145 mg/dL (07-07-22 @ 02:01)  POCT Blood Glucose.: 164 mg/dL (07-06-22 @ 21:36)  POCT Blood Glucose.: 165 mg/dL (07-06-22 @ 15:21)  POCT Blood Glucose.: 136 mg/dL (07-06-22 @ 08:44)  POCT Blood Glucose.: 129 mg/dL (07-06-22 @ 03:56)  POCT Blood Glucose.: 45 mg/dL (07-06-22 @ 03:32)  POCT Blood Glucose.: 46 mg/dL (07-06-22 @ 03:30)  POCT Blood Glucose.: 179 mg/dL (07-05-22 @ 21:56)  POCT Blood Glucose.: 233 mg/dL (07-05-22 @ 14:33)  POCT Blood Glucose.: 178 mg/dL (07-05-22 @ 06:37)  POCT Blood Glucose.: 269 mg/dL (07-05-22 @ 05:49)  POCT Blood Glucose.: 142 mg/dL (07-04-22 @ 22:36)  POCT Blood Glucose.: 192 mg/dL (07-04-22 @ 21:13)  POCT Blood Glucose.: 220 mg/dL (07-04-22 @ 21:00)  POCT Blood Glucose.: 236 mg/dL (07-04-22 @ 20:50)  POCT Blood Glucose.: 120 mg/dL (07-04-22 @ 14:50)                            7.9    7.91  )-----------( 276      ( 07 Jul 2022 06:22 )             26.0       07-07    139  |  104  |  24<H>  ----------------------------<  203<H>  4.4   |  26  |  1.55<H>    Ca    8.2<L>      07 Jul 2022 06:17  Phos  3.7     07-07  Mg     2.2     07-07    TPro  5.4<L>  /  Alb  2.3<L>  /  TBili  0.2  /  DBili  <0.1  /  AST  13  /  ALT  12  /  AlkPhos  138<H>  07-07      eGFR: 49 mL/min/1.73m2 (07 Jul 2022 06:17)  eGFR: 50 mL/min/1.73m2 (06 Jul 2022 22:55)  eGFR: 47 mL/min/1.73m2 (06 Jul 2022 15:36)          Thyroid Function Tests:  07-06 @ 18:36 TSH 4.69 FreeT4 -- T3 43 Anti TPO -- Anti Thyroglobulin Ab -- TSI --          A1C with Estimated Average Glucose Result: 6.0 % (06-30-22 @ 05:49)  A1C with Estimated Average Glucose Result: 6.6 % (04-24-22 @ 17:12)      Estimated Average Glucose: 126 mg/dL (06-30-22 @ 05:49)  Estimated Average Glucose: 143 mg/dL (04-24-22 @ 17:12)                         DIABETES FOLLOW UP : Seen earlier today    INTERVAL HX: tf restarted, running at goal, pt somnolent unable to complete ROS , one hyperglycemic value in past 24 hours       Review of Systems: unable       Allergies    No Known Allergies    Intolerances      MEDICATIONS  (STANDING):  amLODIPine   Tablet 10 milliGRAM(s) Oral <User Schedule>  atorvastatin 40 milliGRAM(s) Oral at bedtime  atovaquone  Suspension 1500 milliGRAM(s) Oral <User Schedule>  chlorhexidine 2% Cloths 1 Application(s) Topical <User Schedule>  doxazosin 4 milliGRAM(s) Oral daily  enoxaparin Injectable 60 milliGRAM(s) SubCutaneous every 12 hours  epoetin cortney-epbx (RETACRIT) Injectable 21379 Unit(s) SubCutaneous every 7 days  fluconAZOLE IVPB 200 milliGRAM(s) IV Intermittent every 24 hours  fosphenytoin IVPB 100 milliGRAM(s) PE IV Intermittent every 12 hours  hydrALAZINE 100 milliGRAM(s) Oral every 8 hours  insulin lispro (ADMELOG) corrective regimen sliding scale   SubCutaneous every 6 hours  levothyroxine Injectable 40 MICROGram(s) IV Push at bedtime  melatonin 6 milliGRAM(s) Oral at bedtime  mycophenolate mofetil Suspension 1000 milliGRAM(s) Enteral Tube <User Schedule>  pantoprazole   Suspension 40 milliGRAM(s) Oral daily  polyethylene glycol 3350 17 Gram(s) Oral daily  predniSONE  Solution 5 milliGRAM(s) Oral daily  sodium zirconium cyclosilicate 10 Gram(s) Oral daily  valGANciclovir 50 mG/mL Oral Solution 450 milliGRAM(s) Oral daily      PHYSICAL EXAM:  VITALS: T(C): 36.6 (07-07-22 @ 07:00)  T(F): 97.9 (07-07-22 @ 07:00), Max: 97.9 (07-06-22 @ 11:00)  HR: 50 (07-07-22 @ 10:00) (50 - 62)  BP: 136/62 (07-07-22 @ 10:00) (131/61 - 169/74)  RR:  (12 - 25)  SpO2:  (88% - 100%)  Wt(kg): --  GENERAL: male laying in bed in NAD on vEEG , NGT w/ TF running, bilateral wrist restraints   Respiratory: Respirations unlabored   Extremities: Warm  NEURO: solmnolent     LABS:  POCT Blood Glucose.: 218 mg/dL (07-07-22 @ 08:26)  POCT Blood Glucose.: 145 mg/dL (07-07-22 @ 02:01)  POCT Blood Glucose.: 164 mg/dL (07-06-22 @ 21:36)  POCT Blood Glucose.: 165 mg/dL (07-06-22 @ 15:21)  POCT Blood Glucose.: 136 mg/dL (07-06-22 @ 08:44)  POCT Blood Glucose.: 129 mg/dL (07-06-22 @ 03:56)  POCT Blood Glucose.: 45 mg/dL (07-06-22 @ 03:32)  POCT Blood Glucose.: 46 mg/dL (07-06-22 @ 03:30)  POCT Blood Glucose.: 179 mg/dL (07-05-22 @ 21:56)  POCT Blood Glucose.: 233 mg/dL (07-05-22 @ 14:33)  POCT Blood Glucose.: 178 mg/dL (07-05-22 @ 06:37)  POCT Blood Glucose.: 269 mg/dL (07-05-22 @ 05:49)  POCT Blood Glucose.: 142 mg/dL (07-04-22 @ 22:36)  POCT Blood Glucose.: 192 mg/dL (07-04-22 @ 21:13)  POCT Blood Glucose.: 220 mg/dL (07-04-22 @ 21:00)  POCT Blood Glucose.: 236 mg/dL (07-04-22 @ 20:50)  POCT Blood Glucose.: 120 mg/dL (07-04-22 @ 14:50)                            7.9    7.91  )-----------( 276      ( 07 Jul 2022 06:22 )             26.0       07-07    139  |  104  |  24<H>  ----------------------------<  203<H>  4.4   |  26  |  1.55<H>    Ca    8.2<L>      07 Jul 2022 06:17  Phos  3.7     07-07  Mg     2.2     07-07    TPro  5.4<L>  /  Alb  2.3<L>  /  TBili  0.2  /  DBili  <0.1  /  AST  13  /  ALT  12  /  AlkPhos  138<H>  07-07      eGFR: 49 mL/min/1.73m2 (07 Jul 2022 06:17)  eGFR: 50 mL/min/1.73m2 (06 Jul 2022 22:55)  eGFR: 47 mL/min/1.73m2 (06 Jul 2022 15:36)          Thyroid Function Tests:  07-06 @ 18:36 TSH 4.69 FreeT4 -- T3 43 Anti TPO -- Anti Thyroglobulin Ab -- TSI --          A1C with Estimated Average Glucose Result: 6.0 % (06-30-22 @ 05:49)  A1C with Estimated Average Glucose Result: 6.6 % (04-24-22 @ 17:12)      Estimated Average Glucose: 126 mg/dL (06-30-22 @ 05:49)  Estimated Average Glucose: 143 mg/dL (04-24-22 @ 17:12)

## 2022-07-07 NOTE — PROGRESS NOTE ADULT - PROBLEM SELECTOR PLAN 2
-Switched from CNI to Belatacept due to neurotoxicity (concern for PRES & seizures)  -Belatacept on 6/23 & 7/4, next dose 7/13  -Increase MMF 1g BID  -continue Pred 5  -PPx:  continue PPI, discontinued bactrim due to hyperkalemia. G6PD level okay. Started on Atovaquone  -D/c fluconazole

## 2022-07-07 NOTE — PROGRESS NOTE ADULT - SUBJECTIVE AND OBJECTIVE BOX
HISTORY  66y Mal[place]/ a PMHx of CAD s/p CABG (2020), AF on Eliquis c/b CVA (2019) c/b seizures, HTN, HLD, DM type II, hypothyroidism, GI bleed due to a duodenal ulcer (2/2022), and ESRD s/p DDRT (4/21/22) c/b DGF requiring HD (last session 4/29/22) & large perinephric hematoma s/p evacuation w/ revision of arterial anastomosis (5/2/22) who presented on 6/10/22 for status epilepticus. Patient was intubated for airway protection. AEDs were adjusted and he was successfully extubated on 6/11. Patient was noted to have a large right pleural effusion so thoracentesis was performed w/ 1.3 L of serosanguineous fluid drained. Patient was started on a heparin infusion for his atrial fibrillation post-procedure but had a H&H drop w/ CXR demonstrating complete right lung whiteout concerning for a hemothorax. Patient was transferred to SICU for further management. He was intubated for airway protection again, transfused several products, and reversed w/ protamine. A right 28 Fr chest tube was placed w/ a significant amount of sanguineous fluid drained. He was noted to have a significant amount of residual hemothorax on CT on 6/12 so he eventually was taken to the OR on 6/15 for a right VATS for hematoma washout and placement of a second chest tube. He was transferred to the floors on 6/17. Both chest tubes were removed on 6/18. He went into status epilepticus again on 6/25 so he was readmitted to SICU. He was intubated for airway protection again on 6/26 and successfully extubated on 6/28. Hospital course has also been significant for delirium, dysphagia requiring NGT placement for enteral access, hyperkalemia, and poor glycemic control.    24 HOUR EVENTS:  - continues to have episodes of hyper and hypoglycemia   -haldolx1 for agitation/ delerium   -EEG restarted for persistent AMS  SUBJECTIVE/ROS:  [ ] A ten-point review of systems was otherwise negative except as noted.  [ ] Due to altered mental status/intubation, subjective information were not able to be obtained from the patient. History was obtained, to the extent possible, from review of the chart and collateral sources of information.      NEURO  RASS:  -1    Exam: awake, oriented to self but waxes and wanes with orientation to place/year, intermittently follows commands   Meds: acetaminophen    Suspension .. 650 milliGRAM(s) Enteral Tube every 6 hours PRN Mild Pain (1 - 3)  fosphenytoin IVPB 100 milliGRAM(s) PE IV Intermittent every 8 hours  melatonin 3 milliGRAM(s) Oral at bedtime  perampanel 4 milliGRAM(s) Oral at bedtime    [x] Adequacy of sedation and pain control has been assessed and adjusted      RESPIRATORY  RR: 16 (07-07-22 @ 00:00) (16 - 25)  SpO2: 100% (07-07-22 @ 00:00) (88% - 100%)  Wt(kg): --  Exam: unlabored, clear to auscultation bilaterally      CARDIOVASCULAR  HR: 55 (07-07-22 @ 00:00) (54 - 62)  BP: 156/66 (07-07-22 @ 00:00) (120/55 - 171/73)  BP(mean): 95 (07-07-22 @ 00:00) (79 - 116)  ABP: --  ABP(mean): --  Wt(kg): --  CVP(cm H2O): --  VBG - ( 05 Jul 2022 21:42 )  pH: 7.41  /  pCO2: 46    /  pO2: 48    / HCO3: 29    / Base Excess: 4.0   /  SaO2: 78.4   Lactate: 1.3            Exam: regular rate and rhythm  Cardiac Rhythm: sinus  Perfusion     [x]Adequate   [ ]Inadequate  Mentation   [x]Normal       [ ]Reduced  Extremities  [x]Warm         [ ]Cool  Volume Status [ ]Hypervolemic [x]Euvolemic [ ]Hypovolemic  Meds: amLODIPine   Tablet 10 milliGRAM(s) Oral <User Schedule>  doxazosin 4 milliGRAM(s) Oral daily  hydrALAZINE 100 milliGRAM(s) Oral every 8 hours        GI/NUTRITION  Exam: soft, nontender, nondistended, incision C/D/I  Diet: on TF   Meds: pantoprazole   Suspension 40 milliGRAM(s) Oral daily  polyethylene glycol 3350 17 Gram(s) Oral daily      GENITOURINARY  I&O's Detail    07-05 @ 07:01  -  07-06 @ 07:00  --------------------------------------------------------  IN:    dextrose 5% + sodium chloride 0.9%: 450 mL    dextrose 5% + sodium chloride 0.9%: 200 mL    Enteral Tube Flush: 180 mL    Free Water: 200 mL    IV PiggyBack: 300 mL    sodium chloride 0.9%: 550 mL  Total IN: 1880 mL    OUT:    Indwelling Catheter - Urethral (mL): 905 mL    Nasogastric/Oral tube (mL): 50 mL  Total OUT: 955 mL    Total NET: 925 mL      07-06 @ 07:01  -  07-07 @ 01:03  --------------------------------------------------------  IN:    dextrose 5% + sodium chloride 0.9%: 200 mL    Enteral Tube Flush: 340 mL    IV PiggyBack: 50 mL    IV PiggyBack: 200 mL    Nepro with Carb Steady: 440 mL  Total IN: 1230 mL    OUT:    Indwelling Catheter - Urethral (mL): 630 mL    Nasogastric/Oral tube (mL): 50 mL  Total OUT: 680 mL    Total NET: 550 mL          07-06    139  |  103  |  24<H>  ----------------------------<  163<H>  5.0   |  24  |  1.53<H>    Ca    8.1<L>      06 Jul 2022 22:55  Phos  4.2     07-06  Mg     2.3     07-06    TPro  6.0  /  Alb  2.5<L>  /  TBili  0.3  /  DBili  0.1  /  AST  18  /  ALT  12  /  AlkPhos  150<H>  07-06    [x] Miranda catheter, indication: failed TOV  Meds:       HEMATOLOGIC  Meds: enoxaparin Injectable 60 milliGRAM(s) SubCutaneous every 12 hours    [x] VTE Prophylaxis                        8.3    9.83  )-----------( 337      ( 06 Jul 2022 05:37 )             27.1     PT/INR - ( 06 Jul 2022 05:37 )   PT: 16.8 sec;   INR: 1.44 ratio         PTT - ( 06 Jul 2022 05:37 )  PTT:44.2 sec  Transfusion     [ ] PRBC   [ ] Platelets   [ ] FFP   [ ] Cryoprecipitate      INFECTIOUS DISEASES  WBC Count: 9.83 K/uL (07-06 @ 05:37)    RECENT CULTURES:    Meds: atovaquone  Suspension 1500 milliGRAM(s) Oral <User Schedule>  epoetin cortney-epbx (RETACRIT) Injectable 54158 Unit(s) SubCutaneous every 7 days  fluconAZOLE IVPB 200 milliGRAM(s) IV Intermittent every 24 hours  mycophenolate mofetil Suspension 1000 milliGRAM(s) Oral <User Schedule>  valGANciclovir 50 mG/mL Oral Solution 450 milliGRAM(s) Oral daily        ENDOCRINE  CAPILLARY BLOOD GLUCOSE  163 (06 Jul 2022 21:00)      POCT Blood Glucose.: 164 mg/dL (06 Jul 2022 21:36)  POCT Blood Glucose.: 165 mg/dL (06 Jul 2022 15:21)  POCT Blood Glucose.: 136 mg/dL (06 Jul 2022 08:44)  POCT Blood Glucose.: 129 mg/dL (06 Jul 2022 03:56)  POCT Blood Glucose.: 45 mg/dL (06 Jul 2022 03:32)  POCT Blood Glucose.: 46 mg/dL (06 Jul 2022 03:30)    Meds: atorvastatin 40 milliGRAM(s) Oral at bedtime  insulin lispro (ADMELOG) corrective regimen sliding scale   SubCutaneous every 6 hours  levothyroxine Injectable 40 MICROGram(s) IV Push at bedtime  predniSONE  Solution 5 milliGRAM(s) Oral daily        ACCESS DEVICES:  [ ] Peripheral IV  [ ] Central Venous Line	[ ] R	[ ] L	[ ] IJ	[ ] Fem	[ ] SC	Placed:   [ ] Arterial Line		[ ] R	[ ] L	[ ] Fem	[ ] Rad	[ ] Ax	Placed:   [ ] PICC:					[ ] Mediport  [ ] Urinary Catheter, Date Placed:   [x] Necessity of urinary, arterial, and venous catheters discussed    OTHER MEDICATIONS:  chlorhexidine 2% Cloths 1 Application(s) Topical <User Schedule>  sodium zirconium cyclosilicate 10 Gram(s) Oral daily      CODE STATUS: full code       IMAGING:

## 2022-07-07 NOTE — EEG REPORT - NS EEG TEXT BOX
[facility]   COMPREHENSIVE EPILEPSY CENTER   REPORT OF LONG-TERM VIDEO EEG     [facility]: [address], [place] [address], Ph#: [phone]  [facility]: [address] [place] [address], Ph#: [phone]  [facility]: [address] [place] [address], Ph#: [phone]    Patient Name: CHAD DUNBAR  Age and : 66y ([birth year])  MRN #: [id number]  Location: [place] [address]  Referring Physician: Lizz Davis    Start Time/Date: 21:2022  End Time/Date: 2022   Duration: 10 hours    _____________________________________________________________  STUDY INFORMATION    EEG Recording Technique:  The patient underwent continuous Video-EEG monitoring, using Telemetry System hardware on the XLTek Digital System. EEG and video data were stored on a computer hard drive with important events saved in digital archive files. The material was reviewed by a physician (electroencephalographer / epileptologist) on a daily basis. Ramon and seizure detection algorithms were utilized and reviewed. An EEG Technician attended to the patient, and was available throughout daytime work hours.  The epilepsy center neurologist was available in person or on call 24-hours per day.    EEG Placement and Labeling of Electrodes:  The EEG was performed utilizing 20 channel referential EEG connections (coronal over temporal over parasagittal montage) using all standard 10-20 electrode placements with EKG, with additional electrodes placed in the inferior temporal region using the modified 10-10 montage electrode placements for elective admissions, or if deemed necessary. Recording was at a sampling rate of 256 samples per second per channel. Time synchronized digital video recording was done simultaneously with EEG recording. A low light infrared camera was used for low light recording.     _____________________________________________________________  HISTORY    Patient is a 66y old  Male who presents with a chief complaint of Status Epilepticus (2022 00:42)      PERTINENT MEDICATION:  MEDICATIONS  (STANDING):  amLODIPine   Tablet 10 milliGRAM(s) Oral <User Schedule>  atorvastatin 40 milliGRAM(s) Oral at bedtime  atovaquone  Suspension 1500 milliGRAM(s) Oral <User Schedule>  chlorhexidine 2% Cloths 1 Application(s) Topical <User Schedule>  doxazosin 4 milliGRAM(s) Oral daily  enoxaparin Injectable 60 milliGRAM(s) SubCutaneous every 12 hours  epoetin cortney-epbx (RETACRIT) Injectable 69367 Unit(s) SubCutaneous every 7 days  finasteride 5 milliGRAM(s) Oral daily  fosphenytoin IVPB 100 milliGRAM(s) PE IV Intermittent every 12 hours  hydrALAZINE 100 milliGRAM(s) Oral every 8 hours  insulin lispro (ADMELOG) corrective regimen sliding scale   SubCutaneous every 6 hours  levothyroxine Injectable 40 MICROGram(s) IV Push at bedtime  melatonin 6 milliGRAM(s) Oral at bedtime  mycophenolate mofetil Suspension 1000 milliGRAM(s) Enteral Tube <User Schedule>  pantoprazole   Suspension 40 milliGRAM(s) Oral daily  polyethylene glycol 3350 17 Gram(s) Oral daily  predniSONE  Solution 5 milliGRAM(s) Oral daily  sodium zirconium cyclosilicate 10 Gram(s) Oral daily  valGANciclovir 50 mG/mL Oral Solution 450 milliGRAM(s) Oral daily    _____________________________________________________________  STUDY INTERPRETATION    Findings: The background was continuous, spontaneously variable and reactive. During brief wakefulness, the posterior dominant rhythm consisted of symmetric, well-modulated 7.5-8 Hz activity.     Background Slowing:  Diffuse theta and polymorphic delta slowing.    Focal Slowing:   Continuous theta/delta slowing in the left frontotemporal region.     Sleep Background:  Drowsiness was characterized by fragmentation, attenuation, and slowing of the background activity.    Sleep was characterized by the presence of vertex waves, symmetric sleep spindles and K complexes.    Non-Epileptiform Findings:  None were present.    Interictal Epileptiform Activity:   Infrequent left posterior sharp discharges    Events:  None    Seizures: None recorded.    Activation Procedures:   Hyperventilation was not performed.    Photic stimulation was not performed.     Artifacts:  Intermittent myogenic and movement artifacts were noted.    ECG:  The heart rate on single channel ECG was predominantly between 60-70 bpm.    _____________________________________________________________  EEG SUMMARY/CLASSIFICATION    Abnormal EEG in an altered / a sedated patient.  - Infrequent left posterior sharp discharges  - Left frontotemporal slowing  - Mild to moderate generalized slowing    _____________________________________________________________  EEG IMPRESSION/CLINICAL CORRELATE    Structural or functional abnormality in the left frontotemporal region.  Mild to moderate nonspecific diffuse or multifocal cerebral dysfunction.   Findings indicate risk for focal seizures from the left posterior region.     _____________________________________________________________    Reece Jordan M.D., epilepsy fellow       [facility]   COMPREHENSIVE EPILEPSY CENTER   REPORT OF LONG-TERM VIDEO EEG     [facility]: [address], [place] [address], Ph#: [phone]  [facility]: [address] [place] [address], Ph#: [phone]  [facility]: [address] [place] [address], Ph#: [phone]    Patient Name: CHAD DUNBAR  Age and : 66y ([birth year])  MRN #: [id number]  Location: [place] [address]  Referring Physician: Lizz Davis    Start Time/Date: 21:06 2022  End Time/Date: 15:58 2022   Duration: 17:58 hours (additional hours added to the study after initial read)    _____________________________________________________________  STUDY INFORMATION    EEG Recording Technique:  The patient underwent continuous Video-EEG monitoring, using Telemetry System hardware on the XLTek Digital System. EEG and video data were stored on a computer hard drive with important events saved in digital archive files. The material was reviewed by a physician (electroencephalographer / epileptologist) on a daily basis. Ramon and seizure detection algorithms were utilized and reviewed. An EEG Technician attended to the patient, and was available throughout daytime work hours.  The epilepsy center neurologist was available in person or on call 24-hours per day.    EEG Placement and Labeling of Electrodes:  The EEG was performed utilizing 20 channel referential EEG connections (coronal over temporal over parasagittal montage) using all standard 10-20 electrode placements with EKG, with additional electrodes placed in the inferior temporal region using the modified 10-10 montage electrode placements for elective admissions, or if deemed necessary. Recording was at a sampling rate of 256 samples per second per channel. Time synchronized digital video recording was done simultaneously with EEG recording. A low light infrared camera was used for low light recording.     _____________________________________________________________  HISTORY    Patient is a 66y old  Male who presents with a chief complaint of Status Epilepticus (2022 00:42)      PERTINENT MEDICATION:  MEDICATIONS  (STANDING):  amLODIPine   Tablet 10 milliGRAM(s) Oral <User Schedule>  atorvastatin 40 milliGRAM(s) Oral at bedtime  atovaquone  Suspension 1500 milliGRAM(s) Oral <User Schedule>  chlorhexidine 2% Cloths 1 Application(s) Topical <User Schedule>  doxazosin 4 milliGRAM(s) Oral daily  enoxaparin Injectable 60 milliGRAM(s) SubCutaneous every 12 hours  epoetin cortney-epbx (RETACRIT) Injectable 91703 Unit(s) SubCutaneous every 7 days  finasteride 5 milliGRAM(s) Oral daily  fosphenytoin IVPB 100 milliGRAM(s) PE IV Intermittent every 12 hours  hydrALAZINE 100 milliGRAM(s) Oral every 8 hours  insulin lispro (ADMELOG) corrective regimen sliding scale   SubCutaneous every 6 hours  levothyroxine Injectable 40 MICROGram(s) IV Push at bedtime  melatonin 6 milliGRAM(s) Oral at bedtime  mycophenolate mofetil Suspension 1000 milliGRAM(s) Enteral Tube <User Schedule>  pantoprazole   Suspension 40 milliGRAM(s) Oral daily  polyethylene glycol 3350 17 Gram(s) Oral daily  predniSONE  Solution 5 milliGRAM(s) Oral daily  sodium zirconium cyclosilicate 10 Gram(s) Oral daily  valGANciclovir 50 mG/mL Oral Solution 450 milliGRAM(s) Oral daily    _____________________________________________________________  STUDY INTERPRETATION    Findings: The background was continuous, spontaneously variable and reactive. During brief wakefulness, the posterior dominant rhythm consisted of symmetric, well-modulated 7.5-8 Hz activity.     Background Slowing:  Diffuse theta and polymorphic delta slowing.    Focal Slowing:   Continuous theta/delta slowing in the left frontotemporal region.     Sleep Background:  Drowsiness was characterized by fragmentation, attenuation, and slowing of the background activity.    Sleep was characterized by the presence of vertex waves, symmetric sleep spindles and K complexes.    Non-Epileptiform Findings:  None were present.    Interictal Epileptiform Activity:   Infrequent left posterior sharp discharges    Events:  None    Seizures: None recorded.    Activation Procedures:   Hyperventilation was not performed.    Photic stimulation was not performed.     Artifacts:  Intermittent myogenic and movement artifacts were noted.    ECG:  The heart rate on single channel ECG was predominantly between 60-70 bpm.    _____________________________________________________________  EEG SUMMARY/CLASSIFICATION    Abnormal EEG in an altered / a sedated patient.  - Infrequent left posterior sharp discharges  - Left frontotemporal slowing  - Mild to moderate generalized slowing    _____________________________________________________________  EEG IMPRESSION/CLINICAL CORRELATE    Structural or functional abnormality in the left frontotemporal region.  Mild to moderate nonspecific diffuse or multifocal cerebral dysfunction.   Findings indicate risk for focal seizures from the left posterior region.     _____________________________________________________________    Reece Jordan M.D., epilepsy fellow       [facility]   COMPREHENSIVE EPILEPSY CENTER   REPORT OF LONG-TERM VIDEO EEG     [facility]: [address], [place] [address], Ph#: [phone]  [facility]: [address] [place] [address], Ph#: [phone]  [facility]: [address] [place] [address], Ph#: [phone]    Patient Name: CHAD DUNBAR  Age and : 66y ([birth year])  MRN #: [id number]  Location: [place] [address]  Referring Physician: Lizz Davis    Start Time/Date: 21:06 2022  End Time/Date: 15:58 2022   Duration: 17:58 hours (additional hours added to the study after initial read)    _____________________________________________________________  STUDY INFORMATION    EEG Recording Technique:  The patient underwent continuous Video-EEG monitoring, using Telemetry System hardware on the XLTek Digital System. EEG and video data were stored on a computer hard drive with important events saved in digital archive files. The material was reviewed by a physician (electroencephalographer / epileptologist) on a daily basis. Ramon and seizure detection algorithms were utilized and reviewed. An EEG Technician attended to the patient, and was available throughout daytime work hours.  The epilepsy center neurologist was available in person or on call 24-hours per day.    EEG Placement and Labeling of Electrodes:  The EEG was performed utilizing 20 channel referential EEG connections (coronal over temporal over parasagittal montage) using all standard 10-20 electrode placements with EKG, with additional electrodes placed in the inferior temporal region using the modified 10-10 montage electrode placements for elective admissions, or if deemed necessary. Recording was at a sampling rate of 256 samples per second per channel. Time synchronized digital video recording was done simultaneously with EEG recording. A low light infrared camera was used for low light recording.     _____________________________________________________________  HISTORY    Patient is a 66y old  Male who presents with a chief complaint of Status Epilepticus (2022 00:42)      PERTINENT MEDICATION:  MEDICATIONS  (STANDING):  amLODIPine   Tablet 10 milliGRAM(s) Oral <User Schedule>  atorvastatin 40 milliGRAM(s) Oral at bedtime  atovaquone  Suspension 1500 milliGRAM(s) Oral <User Schedule>  chlorhexidine 2% Cloths 1 Application(s) Topical <User Schedule>  doxazosin 4 milliGRAM(s) Oral daily  enoxaparin Injectable 60 milliGRAM(s) SubCutaneous every 12 hours  epoetin cortney-epbx (RETACRIT) Injectable 79581 Unit(s) SubCutaneous every 7 days  finasteride 5 milliGRAM(s) Oral daily  fosphenytoin IVPB 100 milliGRAM(s) PE IV Intermittent every 12 hours  hydrALAZINE 100 milliGRAM(s) Oral every 8 hours  insulin lispro (ADMELOG) corrective regimen sliding scale   SubCutaneous every 6 hours  levothyroxine Injectable 40 MICROGram(s) IV Push at bedtime  melatonin 6 milliGRAM(s) Oral at bedtime  mycophenolate mofetil Suspension 1000 milliGRAM(s) Enteral Tube <User Schedule>  pantoprazole   Suspension 40 milliGRAM(s) Oral daily  polyethylene glycol 3350 17 Gram(s) Oral daily  predniSONE  Solution 5 milliGRAM(s) Oral daily  sodium zirconium cyclosilicate 10 Gram(s) Oral daily  valGANciclovir 50 mG/mL Oral Solution 450 milliGRAM(s) Oral daily    _____________________________________________________________  STUDY INTERPRETATION    Findings: The background was continuous, spontaneously variable and reactive. During brief wakefulness, the posterior dominant rhythm consisted of symmetric, well-modulated 7.5-8 Hz activity.     Background Slowing:  Diffuse theta and polymorphic delta slowing.    Focal Slowing:   Continuous theta/delta slowing in the left frontotemporal region.     Sleep Background:  Drowsiness was characterized by fragmentation, attenuation, and slowing of the background activity.    Sleep was characterized by the presence of vertex waves, symmetric sleep spindles and K complexes.    Non-Epileptiform Findings:  None were present.    Interictal Epileptiform Activity:   Infrequent left posterior sharp discharges    Events:  None    Seizures: None recorded.    Activation Procedures:   Hyperventilation was not performed.    Photic stimulation was not performed.     Artifacts:  Intermittent myogenic and movement artifacts were noted.    ECG:  The heart rate on single channel ECG was predominantly between 60-70 bpm.    _____________________________________________________________  EEG SUMMARY/CLASSIFICATION    Abnormal EEG in an altered / a sedated patient.  - Infrequent left posterior sharp discharges  - Left frontotemporal slowing  - Mild to moderate generalized slowing    _____________________________________________________________  EEG IMPRESSION/CLINICAL CORRELATE  Structural or functional abnormality in the left frontotemporal region.  Mild to moderate nonspecific diffuse or multifocal cerebral dysfunction.   Findings indicate risk for focal seizures from the left posterior region.     _____________________________________________________________    Reece Jordan M.D., epilepsy fellow

## 2022-07-07 NOTE — PROGRESS NOTE ADULT - PROBLEM SELECTOR PLAN 1
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 Allograft function stable with good UOP  - Constipation resolved. Discontinue tucker & do TOV today. Add proscar. c/w doxazosin  - will remove stent prior to discharge  -Tube feeds started yesterday. Pt coughing today. Would do a CXR. Family agreeable to PEG      Status epilepticus- Resolved with encephalopathy initially from post ictal state, now possible ICU delirium. LP -ve. vEEG results pending. AED changed per neuro    Hemothorax- resolved. s/p VATS. Now on RA    b/l UE edema- Would get LUE dopplers to r/o DVT. Known history of RIJ thrombus & Afib, continue lovenox full dose

## 2022-07-08 NOTE — PROGRESS NOTE ADULT - ATTENDING COMMENTS
Pt seen and examined with SICU team on 7/8, agree with above.    1. Altered mental status and agitation, recent status epilepticus, seizure disorder:  - Home meds: divalproex 750 qam/ 500 qpm, Keppra 250mg bid  - No further seizures  - Currently on fosphenytoin, perampanel was stopped. Mental status has been improving, less agitation. Slept last night.    - Given AMS, pt has had feeding tube placed. Per pt's family, patient would not want PEG. Will continue tube feeds for now and likely check swallow eval in a day or two. Tolerating tube feeds at goal, good bowel function. Continue bowel regimen.    2. ESRD s/p renal transplant 4/21:  - Home meds: cellcept 500mg bid, envarsus XR 3mg daily, prednisone 5mg daily, fluconazole 200mg daily, bactrim 400mg/80mg daily, valcyte 450mg three times per week  - Hyperkalemia resolved, now off lokelma    3. Essential HTN:  - Home meds: norvasc 10mg daily, coreg 6.25mg bid  - Continue norvasc  - Coreg being held for bradycardia during this admission  - Hydralazine added for better BP control    4. Paroxysmal atrial fibrillation with h/o CVA:  - Home meds: Eliquis 2.5mg bid, coreg 6.25mg bid  - Therapeutic lovenox switched back to Eliquis    5. HLD:  - Home meds: atorvastatin 40mg  - Continue    6. DM type 2 on long-term insulin, with highly variable fsg during this admission:  - Home meds: Humalog 5 units qac, glargine 6units qhs  - HbA1c 6%  - Monitor fsg for fluctuations.    7. GERD:  - Home meds: protonix 40mg daily    8. Acquired hypothyroidism:  - Home meds: Synthroid 50mcg daily

## 2022-07-08 NOTE — PROGRESS NOTE ADULT - SUBJECTIVE AND OBJECTIVE BOX
Transplant Surgery Multidisciplinary Progress Note   --------------------------------------------------------------  R DCD DDRT    4/21/22    Present: Patient seen and examined with multidisciplinary team including Transplant Surgeon: Dr. Barber,  Nephrologist: Dr. Mary Lomeli/Sanjana, Pharmacist: WALDO Lim, Dr. Bhakta ([facility]),  and SICU team during am rounds.  Disciplines not in attendance will be notified of the plan.     HPI: 66M with PMH: DM type II, HTN, CAD s/p CABG in 2020, AFib on Eliquis, CVA in 2019 due to Afib, Seizure d/o following CVA, last episode was on 4/8/22, h/o GIB in 2/2022 EGD with duodenal ulcer.  ESRD due to DM was on HD since 2019.     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  Donor was 58 , KDPI 82%, DCD, single vessels and ureter, HLA mismatch 1, 2, 2. No DSA, cPRA 0%. 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 Re-admitted in May for anemia in the setting of large perinephric hematoma s/p evacuation and repair of arterial anastomosis on 5/2/22. Intra operative biopsy showed no rejection, Creatinine ranging ~2mg/dL.    In Rehab: He was recently dx with klebsiella UTI rx with ertapenem - then switched to Levaquin day #6.    He also had parainfluenza pneumonia. Was at rehab progressing well.      Hospital course:  - 6/10: transferred from rehab facility for seizure status epilepticus. Intubated for respiratory protection and transferred to MICU.  EEG showed no seizure activity. Neuro consulted.   - 6/11: Extubated. Found to have R pleural effusion. s/p Thoracentesis 1.3L. Eliquis changed to heparin drip.  Post procedure drop in H&H. 5u PRBC, 2 u FFP.    - 6/11 evening: Transferred to SICU for further care in setting of hypoxia, significant R pleural effusion, hgb 6 and hemodynamic instability  - 6/11 Intubated at 9pm and sedated on Precedex.  CT placed, 600ml blood drained.  1L total overnight, started pressors  - 6/11 Received Protamine for PTT >100 (was on Heparin drip started for AF), 2 u FFP and 5u PRBC total  - 6/13 s/p VATS 2.2L old blood removed; second chest tube placed  - 6/18-19: chest tubes removed  - 6/21: ?PRES on MRI, off Envarsus, switched to Belatacept 6/23  - 6/25: Tx to SICU for refractory seizures    Interval Events:  - Afebrile, VSS  - mentation with much improvement today after sleep o/n (required Melatonin without any other sedatives)  - tolerating TFs at goal      Immunosuppression:   Induction:  Thymoglobulin                                        Maintenance:  - Belatacept--will re-load today and then 7/18 (gap between initial loading doses 2/2 seizures)  - MMF to 1g BID  - continue Pred 5  -Ongoing monitoring for signs of rejection.    Potential Discharge date: pending clinical improvement  Education:  Medications  Plan of care:  See Below      MEDICATIONS  (STANDING):  amLODIPine   Tablet 10 milliGRAM(s) Oral <User Schedule>  atorvastatin 40 milliGRAM(s) Oral at bedtime  atovaquone  Suspension 1500 milliGRAM(s) Oral <User Schedule>  benzocaine 20% Spray 1 Spray(s) Topical three times a day  chlorhexidine 2% Cloths 1 Application(s) Topical <User Schedule>  doxazosin 4 milliGRAM(s) Oral daily  enoxaparin Injectable 60 milliGRAM(s) SubCutaneous every 12 hours  epoetin cortney-epbx (RETACRIT) Injectable 91868 Unit(s) SubCutaneous every 7 days  finasteride 5 milliGRAM(s) Oral daily  fosphenytoin IVPB 100 milliGRAM(s) PE IV Intermittent every 12 hours  hydrALAZINE 100 milliGRAM(s) Oral every 8 hours  insulin lispro (ADMELOG) corrective regimen sliding scale   SubCutaneous every 6 hours  levothyroxine Injectable 40 MICROGram(s) IV Push at bedtime  melatonin 6 milliGRAM(s) Oral at bedtime  mycophenolate mofetil Suspension 1000 milliGRAM(s) Enteral Tube <User Schedule>  pantoprazole   Suspension 40 milliGRAM(s) Oral daily  polyethylene glycol 3350 17 Gram(s) Oral daily  predniSONE  Solution 5 milliGRAM(s) Oral daily  sodium zirconium cyclosilicate 10 Gram(s) Oral daily  valGANciclovir 50 mG/mL Oral Solution 450 milliGRAM(s) Oral daily    MEDICATIONS  (PRN):  acetaminophen    Suspension .. 650 milliGRAM(s) Enteral Tube every 6 hours PRN Mild Pain (1 - 3)            Vital Signs Last 24 Hrs  T(C): 36.5 (08 Jul 2022 15:00), Max: 36.6 (08 Jul 2022 11:00)  T(F): 97.7 (08 Jul 2022 15:00), Max: 97.9 (08 Jul 2022 11:00)  HR: 58 (08 Jul 2022 16:00) (55 - 63)  BP: 151/65 (08 Jul 2022 16:00) (117/81 - 174/74)  BP(mean): 93 (08 Jul 2022 16:00) (85 - 117)  RR: 17 (08 Jul 2022 16:00) (12 - 26)  SpO2: 98% (08 Jul 2022 16:00) (82% - 100%)    Parameters below as of 08 Jul 2022 07:00  Patient On (Oxygen Delivery Method): room air        I&O's Summary    07 Jul 2022 07:01  -  08 Jul 2022 07:00  --------------------------------------------------------  IN: 1390 mL / OUT: 815 mL / NET: 575 mL    08 Jul 2022 07:01  -  08 Jul 2022 16:46  --------------------------------------------------------  IN: 650 mL / OUT: 305 mL / NET: 345 mL                              8.6    6.93  )-----------( 309      ( 08 Jul 2022 06:08 )             28.6     07-08    139  |  104  |  24<H>  ----------------------------<  207<H>  4.0   |  24  |  1.45<H>    Ca    8.5      08 Jul 2022 06:08  Phos  3.1     07-08  Mg     2.2     07-08    TPro  5.8<L>  /  Alb  2.3<L>  /  TBili  0.2  /  DBili  0.1  /  AST  15  /  ALT  11  /  AlkPhos  140<H>  07-08                    REVIEW OF SYSTEMS  --------------------------------------------------------------------------------  unable to assess, +delirium        PHYSICAL EXAM:  Constitutional: Well developed / well nourished  Eyes: Anicteric, PERRLA  ENMT: nc/at  Neck: supple  Respiratory: CTA   Cardiovascular: RRR  Gastrointestinal: Soft, non distended, NT, incision healed   Genitourinary: voiding via tucker  Extremities: SCD's in place and working bilaterally, LUE pitting edema, RUE with improvement  Vascular: Palpable dp pulses bilaterally  Neurological: lethargic  Skin: no rashes, ulcerations or lesions  Musculoskeletal: Moving all extremities, global weakness  Psychiatric: calm, periods of agitation

## 2022-07-08 NOTE — PROGRESS NOTE ADULT - SUBJECTIVE AND OBJECTIVE BOX
HISTORY  65 y/o male w/ a PMHx of CAD s/p CABG, AF on Eliquis c/b CVA c/b seizures, HTN, HLD, DM type II, hypothyroidism, GI bleed due to a duodenal ulcer, and ESRD s/p DDRT c/b DGF requiring HD & large perinephric hematoma s/p evacuation w/ revision of arterial anastomosis who presented in status epilepticus requiring intubation for airway protection with a hospital course c/b large right pleural effusion s/p thoracentesis c/b hemothorax while being anticoagulated & requiring intubation for airway protection s/p chest tube placement w/ CT demonstrating retained hemothorax s/p right VATS, status epilepticus again causing AMS requiring intubation for airway protection again, delirium, dysphagia requiring NGT placement for enteral access, hyperkalemia, and poor glycemic control.    24 HOUR EVENTS:  no acute events   SUBJECTIVE/ROS:  [ ] A ten-point review of systems was otherwise negative except as noted.  [ ] Due to altered mental status/intubation, subjective information were not able to be obtained from the patient. History was obtained, to the extent possible, from review of the chart and collateral sources of information.      NEURO  RASS: 0/+1     Exam: awake, alert, oriented to self, waxes and wanes to place/year but following commands   Meds: acetaminophen    Suspension .. 650 milliGRAM(s) Enteral Tube every 6 hours PRN Mild Pain (1 - 3)  fosphenytoin IVPB 100 milliGRAM(s) PE IV Intermittent every 12 hours  melatonin 6 milliGRAM(s) Oral at bedtime    [x] Adequacy of sedation and pain control has been assessed and adjusted      RESPIRATORY  RR: 14 (07-08-22 @ 04:00) (12 - 22)  SpO2: 100% (07-08-22 @ 04:00) (82% - 100%)  Wt(kg): --  Exam: unlabored, clear to auscultation bilaterally    Meds:       CARDIOVASCULAR  HR: 55 (07-08-22 @ 04:00) (50 - 60)  BP: 168/73 (07-08-22 @ 04:00) (124/87 - 174/74)  BP(mean): 105 (07-08-22 @ 04:00) (88 - 106)  ABP: --  ABP(mean): --  Wt(kg): --  CVP(cm H2O): --      Exam: regular rate and rhythm  Cardiac Rhythm: sinus  Perfusion     [x]Adequate   [ ]Inadequate  Mentation   [x]Normal       [ ]Reduced  Extremities  [x]Warm         [ ]Cool  Volume Status [ ]Hypervolemic [x]Euvolemic [ ]Hypovolemic  Meds: amLODIPine   Tablet 10 milliGRAM(s) Oral <User Schedule>  doxazosin 4 milliGRAM(s) Oral daily  hydrALAZINE 100 milliGRAM(s) Oral every 8 hours        GI/NUTRITION  Exam: soft, nontender, nondistended, incision C/D/I  Diet: TF  Meds: pantoprazole   Suspension 40 milliGRAM(s) Oral daily  polyethylene glycol 3350 17 Gram(s) Oral daily      GENITOURINARY  I&O's Detail    07-06 @ 07:01  -  07-07 @ 07:00  --------------------------------------------------------  IN:    dextrose 5% + sodium chloride 0.9%: 200 mL    Enteral Tube Flush: 430 mL    IV PiggyBack: 50 mL    IV PiggyBack: 200 mL    Nepro with Carb Steady: 720 mL  Total IN: 1600 mL    OUT:    Indwelling Catheter - Urethral (mL): 920 mL    Nasogastric/Oral tube (mL): 50 mL  Total OUT: 970 mL    Total NET: 630 mL      07-07 @ 07:01  -  07-08 @ 04:33  --------------------------------------------------------  IN:    Enteral Tube Flush: 270 mL    IV PiggyBack: 50 mL    Nepro with Carb Steady: 760 mL  Total IN: 1080 mL    OUT:    Indwelling Catheter - Urethral (mL): 595 mL  Total OUT: 595 mL    Total NET: 485 mL          07-08    143  |  104  |  24<H>  ----------------------------<  181<H>  4.3   |  26  |  1.46<H>    Ca    8.3<L>      08 Jul 2022 00:32  Phos  3.2     07-08  Mg     2.2     07-08    TPro  5.4<L>  /  Alb  2.3<L>  /  TBili  0.2  /  DBili  <0.1  /  AST  13  /  ALT  12  /  AlkPhos  138<H>  07-07    [ ] Miranda catheter, indication: N/A  Meds:       HEMATOLOGIC  Meds: apixaban 5 milliGRAM(s) Oral every 12 hours    [x] VTE Prophylaxis                        7.9    7.91  )-----------( 276      ( 07 Jul 2022 06:22 )             26.0     PT/INR - ( 07 Jul 2022 06:24 )   PT: 16.5 sec;   INR: 1.43 ratio         PTT - ( 07 Jul 2022 06:24 )  PTT:45.1 sec  Transfusion     [ ] PRBC   [ ] Platelets   [ ] FFP   [ ] Cryoprecipitate      INFECTIOUS DISEASES  WBC Count: 7.91 K/uL (07-07 @ 06:22)    RECENT CULTURES:    Meds: atovaquone  Suspension 1500 milliGRAM(s) Oral <User Schedule>  epoetin cortney-epbx (RETACRIT) Injectable 91766 Unit(s) SubCutaneous every 7 days  mycophenolate mofetil Suspension 1000 milliGRAM(s) Enteral Tube <User Schedule>  valGANciclovir 50 mG/mL Oral Solution 450 milliGRAM(s) Oral daily        ENDOCRINE  CAPILLARY BLOOD GLUCOSE  178 (08 Jul 2022 00:00)      POCT Blood Glucose.: 190 mg/dL (08 Jul 2022 00:55)  POCT Blood Glucose.: 178 mg/dL (08 Jul 2022 00:54)  POCT Blood Glucose.: 168 mg/dL (07 Jul 2022 23:31)  POCT Blood Glucose.: 102 mg/dL (07 Jul 2022 17:35)  POCT Blood Glucose.: 215 mg/dL (07 Jul 2022 12:39)  POCT Blood Glucose.: 218 mg/dL (07 Jul 2022 08:26)    Meds: atorvastatin 40 milliGRAM(s) Oral at bedtime  finasteride 5 milliGRAM(s) Oral daily  insulin lispro (ADMELOG) corrective regimen sliding scale   SubCutaneous every 6 hours  levothyroxine Injectable 40 MICROGram(s) IV Push at bedtime  predniSONE  Solution 5 milliGRAM(s) Oral daily        ACCESS DEVICES:  [ ] Peripheral IV  [ ] Central Venous Line	[ ] R	[ ] L	[ ] IJ	[ ] Fem	[ ] SC	Placed:   [ ] Arterial Line		[ ] R	[ ] L	[ ] Fem	[ ] Rad	[ ] Ax	Placed:   [ ] PICC:					[ ] Mediport  [ ] Urinary Catheter, Date Placed:   [x] Necessity of urinary, arterial, and venous catheters discussed    OTHER MEDICATIONS:  chlorhexidine 2% Cloths 1 Application(s) Topical <User Schedule>  sodium zirconium cyclosilicate 10 Gram(s) Oral daily      CODE STATUS: full code       IMAGING:

## 2022-07-08 NOTE — PROGRESS NOTE ADULT - SUBJECTIVE AND OBJECTIVE BOX
DIABETES FOLLOW UP : Seen earlier today    INTERVAL HX: pt more awake today, son at bedside, changed to nepro TF 24h -199 since dinner possible speech and swallow today     Review of Systems:  General: As above  Cardiovascular: No chest pain  Respiratory: No SOB  GI: No nausea, vomiting  Endocrine: no  S&Sx of hypoglycemia    Allergies    No Known Allergies    Intolerances      MEDICATIONS  (STANDING):  amLODIPine   Tablet 10 milliGRAM(s) Oral <User Schedule>  apixaban 5 milliGRAM(s) Oral every 12 hours  atorvastatin 40 milliGRAM(s) Oral at bedtime  atovaquone  Suspension 1500 milliGRAM(s) Oral <User Schedule>  belatacept IVPB 625 milliGRAM(s) IV Intermittent once  chlorhexidine 2% Cloths 1 Application(s) Topical <User Schedule>  doxazosin 4 milliGRAM(s) Oral daily  epoetin cortney-epbx (RETACRIT) Injectable 75663 Unit(s) SubCutaneous every 7 days  finasteride 5 milliGRAM(s) Oral daily  fosphenytoin IVPB 100 milliGRAM(s) PE IV Intermittent every 12 hours  hydrALAZINE 100 milliGRAM(s) Oral every 8 hours  insulin lispro (ADMELOG) corrective regimen sliding scale   SubCutaneous every 6 hours  levothyroxine Injectable 40 MICROGram(s) IV Push at bedtime  melatonin 6 milliGRAM(s) Oral at bedtime  mycophenolate mofetil Suspension 1000 milliGRAM(s) Enteral Tube <User Schedule>  pantoprazole   Suspension 40 milliGRAM(s) Oral daily  polyethylene glycol 3350 17 Gram(s) Oral daily  predniSONE  Solution 5 milliGRAM(s) Oral daily  sodium zirconium cyclosilicate 10 Gram(s) Oral daily  valGANciclovir 50 mG/mL Oral Solution 450 milliGRAM(s) Oral daily      PHYSICAL EXAM:  VITALS: T(C): 36.5 (07-08-22 @ 07:00)  T(F): 97.7 (07-08-22 @ 07:00), Max: 97.7 (07-08-22 @ 07:00)  HR: 62 (07-08-22 @ 11:00) (53 - 63)  BP: 155/70 (07-08-22 @ 11:00) (124/87 - 174/74)  RR:  (12 - 26)  SpO2:  (82% - 100%)  Wt(kg): --  GENERAL: male laying in bed  in NAD NGT w/ TF running   Respiratory: Respirations unlabored   Extremities: Warm, no edema  NEURO: Alert , appropriate     LABS:  POCT Blood Glucose.: 199 mg/dL (07-08-22 @ 08:32)  POCT Blood Glucose.: 195 mg/dL (07-08-22 @ 05:39)  POCT Blood Glucose.: 190 mg/dL (07-08-22 @ 00:55)  POCT Blood Glucose.: 178 mg/dL (07-08-22 @ 00:54)  POCT Blood Glucose.: 168 mg/dL (07-07-22 @ 23:31)  POCT Blood Glucose.: 102 mg/dL (07-07-22 @ 17:35)  POCT Blood Glucose.: 215 mg/dL (07-07-22 @ 12:39)  POCT Blood Glucose.: 218 mg/dL (07-07-22 @ 08:26)  POCT Blood Glucose.: 145 mg/dL (07-07-22 @ 02:01)  POCT Blood Glucose.: 164 mg/dL (07-06-22 @ 21:36)  POCT Blood Glucose.: 165 mg/dL (07-06-22 @ 15:21)  POCT Blood Glucose.: 136 mg/dL (07-06-22 @ 08:44)  POCT Blood Glucose.: 129 mg/dL (07-06-22 @ 03:56)  POCT Blood Glucose.: 45 mg/dL (07-06-22 @ 03:32)  POCT Blood Glucose.: 46 mg/dL (07-06-22 @ 03:30)  POCT Blood Glucose.: 179 mg/dL (07-05-22 @ 21:56)  POCT Blood Glucose.: 233 mg/dL (07-05-22 @ 14:33)                            8.6    6.93  )-----------( 309      ( 08 Jul 2022 06:08 )             28.6       07-08    139  |  104  |  24<H>  ----------------------------<  207<H>  4.0   |  24  |  1.45<H>    Ca    8.5      08 Jul 2022 06:08  Phos  3.1     07-08  Mg     2.2     07-08    TPro  5.8<L>  /  Alb  2.3<L>  /  TBili  0.2  /  DBili  0.1  /  AST  15  /  ALT  11  /  AlkPhos  140<H>  07-08      eGFR: 53 mL/min/1.73m2 (08 Jul 2022 06:08)  eGFR: 52 mL/min/1.73m2 (08 Jul 2022 00:32)  eGFR: 48 mL/min/1.73m2 (07 Jul 2022 15:43)          Thyroid Function Tests:  07-06 @ 18:36 TSH 4.69 FreeT4 -- T3 43 Anti TPO -- Anti Thyroglobulin Ab -- TSI --          A1C with Estimated Average Glucose Result: 6.0 % (06-30-22 @ 05:49)  A1C with Estimated Average Glucose Result: 6.6 % (04-24-22 @ 17:12)      Estimated Average Glucose: 126 mg/dL (06-30-22 @ 05:49)  Estimated Average Glucose: 143 mg/dL (04-24-22 @ 17:12)

## 2022-07-08 NOTE — PROGRESS NOTE ADULT - ASSESSMENT
66 yr old M with Type 2 DM> unknown control due to skewed A1C 6.6% due to chronic anemia and s/p blood tx. On basal/bolus insulin therapy PTA. DM c/b ESRD s/p DDRT on 3/21, CVA, CAD s/p CABG, Afib on apixaban, seizure activity, HTN, HLD, h/o GI bleed in 2/2022 EGD with duodenal ulcer, discharged to [facility] rehab center after prior hospitalization, now re-admitted from [facility] for seizures c/f status epilepticus in setting of UTI. endocrine consulted for steroid induced hyperglycemia, now on home dose prednisone 5mg. Prolonged hospital course w/ ICU stay, previously intubated/extubated, previous seizures.   TF Stopped 2/2 vomiting 2/2 ileus. now w/ TF restarted at goal rate        Type 2 diabetes mellitus with hypoglycemia, with long-term current use of insulin.   on 24H Nepro TF   BG Goal 100-180mg/dl   FS q6h  recommend changing to LOW dose correction scale q6h for now,pt received 4 units correction yesterday with >100 point decrease!! avoid over correction, avoid hypoglycemia!    if TF continue:   if BG remains >180 while TF at goal, recommend restarting NPH 2 units q8h 6am 2pm and 10pm. HOLD IF TFS ARE OFf  and change to  Admelog low dose correction scale q8h 6am 2pm and 10pm  Low correction scale q6h  -Please inform endo team of any changes on steroid therapy or PO/TF status    If Changed to PO diet:   Given low requirements at this time , inclined to hold off on standing insulin for now, if changed to PO diet, change LDSS to TID AC and qHS   Previously when on diet pt was managed on admelog 4 units and lantus 4 units please contact endocrine if diet changed for assistance in adjusting regimen     Discharge  - Likely discharge on basal bolus insulin, Plan TBD based on insulin requirements at [place].   Outpatient f/u with Endocrinologist Dr. Lincoln. [address] [facility] [address]. Phone  Needs apt at time of discharge  -Needs optho/renal/cardiac f/u as out pt  -Make sure pt has insulin sand DM supplies at time of discharge.     Problem/Plan - 2:  ·  Problem: HTN (hypertension).   ·  Plan: BP goal and management per primary team/neuro.     Problem/Plan - 3:  ·  Problem: Hypothyroidism.   ·  Plan: -C/w levothyroxine Injectable 40 MICROGram(s) IV Push at bedtime  -Once taking POs can restart LT4 50 mcg daily on an empty stomach at least 1 hour apart from from food or PO meds  - monitor TFTs outpatient.     Problem/Plan - 4:  ·  Problem: HLD (hyperlipidemia).   ·  Plan: -C/w atorvastatin 40 milliGRAM(s) Oral at bedtime  -F/u lipids as out pt.    Discussed with patient and primary  team SICU provider   Contact via [employer] Teams during business hours  On evenings and weekends, please call [phone] or page endocrine fellow on call.   Please note that this patient may be followed by different provider tomorrow.    Time spent on encounter: 26 minutes spent on total encounter; The necessity of the time spent during the encounter on this date of service was due to development of plan of care/coordination of care/glycemic control through review of labs, blood glucose values and vital signs.  66 yr old M with Type 2 DM> unknown control due to skewed A1C 6.6% due to chronic anemia and s/p blood tx. On basal/bolus insulin therapy PTA. DM c/b ESRD s/p DDRT on 3/21, CVA, CAD s/p CABG, Afib on apixaban, seizure activity, HTN, HLD, h/o GI bleed in 2/2022 EGD with duodenal ulcer, discharged to [facility] rehab center after prior hospitalization, now re-admitted from [facility] for seizures c/f status epilepticus in setting of UTI. endocrine consulted for steroid induced hyperglycemia, now on home dose prednisone 5mg. Prolonged hospital course w/ ICU stay, previously intubated/extubated, previous seizures.   TF Stopped 2/2 vomiting 2/2 ileus. now w/ TF restarted at goal rate        Type 2 diabetes mellitus with hypoglycemia, with long-term current use of insulin.   on 24H Nepro TF   BG Goal 100-180mg/dl   FS q6h  recommend changing to LOW dose correction scale q6h for now,pt received 4 units correction yesterday with >100 point decrease!! avoid over correction, avoid hypoglycemia!    if TF continue:   if BG remains >180 while TF at goal, recommend restarting NPH 2 units q8h 6am 2pm and 10pm. HOLD IF TFS ARE OFf  and change to  Admelog low dose correction scale q8h 6am 2pm and 10pm  -Please inform endo team of any changes on steroid therapy or PO/TF status    If Changed to PO diet:   Given low requirements at this time , inclined to hold off on standing insulin for now, if changed to PO diet, change LDSS to TID AC and qHS   Previously when on diet pt was managed on admelog 4 units and lantus 4 units please contact endocrine if diet changed for assistance in adjusting regimen     Discharge  - Likely discharge on basal bolus insulin, Plan TBD based on insulin requirements at [place].   Outpatient f/u with Endocrinologist Dr. Lincoln. [address]. Phone  Needs apt at time of discharge  -Needs optho/renal/cardiac f/u as out pt  -Make sure pt has insulin sand DM supplies at time of discharge.     Problem/Plan - 2:  ·  Problem: HTN (hypertension).   ·  Plan: BP goal and management per primary team/neuro.     Problem/Plan - 3:  ·  Problem: Hypothyroidism.   ·  Plan: -C/w levothyroxine Injectable 40 MICROGram(s) IV Push at bedtime  -Once taking POs can restart LT4 50 mcg daily on an empty stomach at least 1 hour apart from from food or PO meds  - monitor TFTs outpatient.     Problem/Plan - 4:  ·  Problem: HLD (hyperlipidemia).   ·  Plan: -C/w atorvastatin 40 milliGRAM(s) Oral at bedtime  -F/u lipids as out pt.    Discussed with patient and primary  team SICU provider   Contact via [employer] Teams during business hours  On evenings and weekends, please call [phone] or page endocrine fellow on call.   Please note that this patient may be followed by different provider tomorrow.    Time spent on encounter: 26 minutes spent on total encounter; The necessity of the time spent during the encounter on this date of service was due to development of plan of care/coordination of care/glycemic control through review of labs, blood glucose values and vital signs.

## 2022-07-08 NOTE — PROGRESS NOTE ADULT - ASSESSMENT
66 year old male with PMH of CAD s/p CABG in 2020, AF on Eliquis c/b CVA in 2019 c/b seizures, HTN, HLD, DM type II, hypothyroidism, GI bleed due to a duodenal ulcer (2/2022), and ESRD s/p DDRT (4/21/22) c/b DGF requiring HD (last session 4/29/22) admitted with  status epilepticus    1.DCD DDRT on 4/21/220- Allograft function is stable   2.IS meds- on Belatacept  (concern for PRES & seizures) Last dose was on 7/4 and then further doses were held. will do loading dose of belatacept  again.  continue  on   mg BID and  Prednisone 5 mg po daily   3.Hyperkalemia- resolved on Lokelma, bactrim was d/c and switched  to mepron  4. Altered mentation-  Neurology is following. on vEEG and AED management .

## 2022-07-08 NOTE — PROGRESS NOTE ADULT - SUBJECTIVE AND OBJECTIVE BOX
[facility] [place] [facility]   MD NINA MASON MD, DO SADIE RAMIREZ NP    TEL:  OFFICE: [phone]    From 5pm-7am Answering Service [phone]    -- RENAL FOLLOW UP NOTE ---Date of Service 07-08-22 @ 14:25    Patient is a 66y old  Male who presents with a chief complaint of Status Epilepticus (08 Jul 2022 11:37)      Patient seen and examined in ICU.     VITALS:  T(F): 97.9 (07-08-22 @ 11:00), Max: 97.9 (07-08-22 @ 11:00)  HR: 60 (07-08-22 @ 14:00)  BP: 174/71 (07-08-22 @ 14:00)  RR: 22 (07-08-22 @ 14:00)  SpO2: 98% (07-08-22 @ 14:00)  Wt(kg): --    07-07 @ 07:01  -  07-08 @ 07:00  --------------------------------------------------------  IN: 1390 mL / OUT: 815 mL / NET: 575 mL    07-08 @ 07:01  -  07-08 @ 14:25  --------------------------------------------------------  IN: 470 mL / OUT: 305 mL / NET: 165 mL          PHYSICAL EXAM:  Constitutional: NAD  Neck: No JVD  Respiratory: CTAB, no wheezes, rales or rhonchi  Cardiovascular: S1, S2, RRR  Gastrointestinal: BS+, soft, NT/ND  Extremities: No peripheral edema    Hospital Medications:   MEDICATIONS  (STANDING):  amLODIPine   Tablet 10 milliGRAM(s) Oral <User Schedule>  atorvastatin 40 milliGRAM(s) Oral at bedtime  atovaquone  Suspension 1500 milliGRAM(s) Oral <User Schedule>  chlorhexidine 2% Cloths 1 Application(s) Topical <User Schedule>  doxazosin 4 milliGRAM(s) Oral daily  enoxaparin Injectable 60 milliGRAM(s) SubCutaneous every 12 hours  epoetin cortney-epbx (RETACRIT) Injectable 97708 Unit(s) SubCutaneous every 7 days  finasteride 5 milliGRAM(s) Oral daily  fosphenytoin IVPB 100 milliGRAM(s) PE IV Intermittent every 12 hours  hydrALAZINE 100 milliGRAM(s) Oral every 8 hours  insulin lispro (ADMELOG) corrective regimen sliding scale   SubCutaneous every 6 hours  levothyroxine Injectable 40 MICROGram(s) IV Push at bedtime  melatonin 6 milliGRAM(s) Oral at bedtime  mycophenolate mofetil Suspension 1000 milliGRAM(s) Enteral Tube <User Schedule>  pantoprazole   Suspension 40 milliGRAM(s) Oral daily  polyethylene glycol 3350 17 Gram(s) Oral daily  predniSONE  Solution 5 milliGRAM(s) Oral daily  sodium zirconium cyclosilicate 10 Gram(s) Oral daily  valGANciclovir 50 mG/mL Oral Solution 450 milliGRAM(s) Oral daily      LABS:  07-08    139  |  104  |  24<H>  ----------------------------<  207<H>  4.0   |  24  |  1.45<H>    Ca    8.5      08 Jul 2022 06:08  Phos  3.1     07-08  Mg     2.2     07-08    TPro  5.8<L>  /  Alb  2.3<L>  /  TBili  0.2  /  DBili  0.1  /  AST  15  /  ALT  11  /  AlkPhos  140<H>  07-08    Creatinine Trend: 1.45 <--, 1.46 <--, 1.56 <--, 1.55 <--, 1.53 <--, 1.61 <--, 1.71 <--, 1.69 <--, 1.66 <--, 1.67 <--, 1.60 <--, 1.65 <--, 1.52 <--, 1.52 <--, 1.50 <--, 1.51 <--, 1.49 <--, 1.49 <--, 1.45 <--, 1.45 <--, 1.48 <--, 1.42 <--    Albumin, Serum: 2.3 g/dL (07-08 @ 06:08)  Phosphorus Level, Serum: 3.1 mg/dL (07-08 @ 06:08)  Phosphorus Level, Serum: 3.2 mg/dL (07-08 @ 00:32)  Phosphorus Level, Serum: 3.4 mg/dL (07-07 @ 15:43)                              8.6    6.93  )-----------( 309      ( 08 Jul 2022 06:08 )             28.6     Urine Studies:  Urinalysis - [07-03-22 @ 14:32]      Color Light Yellow / Appearance Clear / SG 1.018 / pH 8.0      Gluc Negative / Ketone Negative  / Bili Negative / Urobili Negative       Blood Negative / Protein Trace / Leuk Est Negative / Nitrite Negative      RBC 1 / WBC 2 / Hyaline 0 / Gran  / Sq Epi  / Non Sq Epi 1 / Bacteria Negative      Iron 17, TIBC 171, %sat 10      [05-02-22 @ 13:03]  Ferritin 1505      [05-02-22 @ 13:05]  PTH -- (Ca 9.2)      [02-05-22 @ 10:13]   294  HbA1c 6.0      [02-21-20 @ 08:53]  TSH 4.69      [07-06-22 @ 18:36]      Syphilis Screen (Treponema Pallidum Ab) Negative      [06-27-22 @ 02:00]    RADIOLOGY & ADDITIONAL STUDIES:

## 2022-07-08 NOTE — PROGRESS NOTE ADULT - ASSESSMENT
66 yr old Male with PMHx ESRD s/p permacath removal 5/10/22 s/p Rt DDRT 4/21/22,  CVA (2019), Afib on apixaban, seizure activity, CAD s/p stents, CABG (2020), HTN, HLD, DM on insulin, gastric/duodenal ulcer, recent hospitalization 4/28/22 -5/10/22 with weakness/anemia found to have perinephric hematoma requiring evacuation and repair of bleeding arterial anastomosis. Curently being treated for UTI with Levaquin Today after developing multiple sz episodes refractory to 5 mg versed IM (EMS) and 2 mg ativan IV in E.D. concerning for status epilepticus requiring intubation for hypoxic respiratory  failure. MICU Consult was called for hypoxic respiratory failure secondary to status epilepticus.  Nephrology consulted for renal failure.     A/P  DDRT on 04/21/22  Course complicated by DGF, was on HD until 4/29/22. Readmitted in May for anemia in the setting of large perinephric hematoma s/p evacuation and repair of arterial anastomosis on 5/2/22. Intra operative biopsy showed no rejection.  ANA likely sec to  hemodynamic change   scr remains stable  optimize glucose   monitor BMP, U/O     Hyperkalemia   resolved   monitor K closely     HTN   fluctuating   monitor BP closely     Immunosuppression  continue per transplant team

## 2022-07-08 NOTE — PROGRESS NOTE ADULT - SUBJECTIVE AND OBJECTIVE BOX
[facility] DIVISION OF KIDNEY DISEASES AND HYPERTENSION -- FOLLOW UP NOTE  --------------------------------------------------------------------------------  Chief Complaint:    24 hour events/subjective:  Patient was seen and examined at bedside        PAST HISTORY  --------------------------------------------------------------------------------  No significant changes to PMH, PSH, FHx, SHx, unless otherwise noted    ALLERGIES & MEDICATIONS  --------------------------------------------------------------------------------  Allergies    No Known Allergies    Intolerances      Standing Inpatient Medications  amLODIPine   Tablet 10 milliGRAM(s) Oral <User Schedule>  atorvastatin 40 milliGRAM(s) Oral at bedtime  atovaquone  Suspension 1500 milliGRAM(s) Oral <User Schedule>  chlorhexidine 2% Cloths 1 Application(s) Topical <User Schedule>  doxazosin 4 milliGRAM(s) Oral daily  enoxaparin Injectable 60 milliGRAM(s) SubCutaneous every 12 hours  epoetin cortney-epbx (RETACRIT) Injectable 50594 Unit(s) SubCutaneous every 7 days  finasteride 5 milliGRAM(s) Oral daily  fosphenytoin IVPB 100 milliGRAM(s) PE IV Intermittent every 12 hours  hydrALAZINE 100 milliGRAM(s) Oral every 8 hours  insulin lispro (ADMELOG) corrective regimen sliding scale   SubCutaneous every 6 hours  levothyroxine Injectable 40 MICROGram(s) IV Push at bedtime  melatonin 6 milliGRAM(s) Oral at bedtime  mycophenolate mofetil Suspension 1000 milliGRAM(s) Enteral Tube <User Schedule>  pantoprazole   Suspension 40 milliGRAM(s) Oral daily  polyethylene glycol 3350 17 Gram(s) Oral daily  predniSONE  Solution 5 milliGRAM(s) Oral daily  sodium zirconium cyclosilicate 10 Gram(s) Oral daily  valGANciclovir 50 mG/mL Oral Solution 450 milliGRAM(s) Oral daily    PRN Inpatient Medications  acetaminophen    Suspension .. 650 milliGRAM(s) Enteral Tube every 6 hours PRN      REVIEW OF SYSTEMS- unable to obtain     VITALS/PHYSICAL EXAM  --------------------------------------------------------------------------------  T(C): 36.6 (07-08-22 @ 11:00), Max: 36.6 (07-08-22 @ 11:00)  HR: 60 (07-08-22 @ 14:00) (55 - 63)  BP: 174/71 (07-08-22 @ 14:00) (117/81 - 174/74)  RR: 22 (07-08-22 @ 14:00) (12 - 26)  SpO2: 98% (07-08-22 @ 14:00) (82% - 100%)  Wt(kg): --        07-07-22 @ 07:01  -  07-08-22 @ 07:00  --------------------------------------------------------  IN: 1390 mL / OUT: 815 mL / NET: 575 mL    07-08-22 @ 07:01  -  07-08-22 @ 14:48  --------------------------------------------------------  IN: 470 mL / OUT: 305 mL / NET: 165 mL      Physical Exam:  	Gen: NAD  	HEENT: Anicteric, no JVD  	Pulm: CTA B/L  	CV: RRR, S1S2  	Abd: +BS, soft, nontender/nondistended             Extremities: no bilateral LE edema, UE edema b/l              Neuro: lethargic, barely arousable  	Skin: Warm, without rashes                Miranda with clear urine              Transplant: No tenderness, swelling    LABS/STUDIES  --------------------------------------------------------------------------------              8.6    6.93  >-----------<  309      [07-08-22 @ 06:08]              28.6     139  |  104  |  24  ----------------------------<  207      [07-08-22 @ 06:08]  4.0   |  24  |  1.45        Ca     8.5     [07-08-22 @ 06:08]      Mg     2.2     [07-08-22 @ 06:08]      Phos  3.1     [07-08-22 @ 06:08]    TPro  5.8  /  Alb  2.3  /  TBili  0.2  /  DBili  0.1  /  AST  15  /  ALT  11  /  AlkPhos  140  [07-08-22 @ 06:08]    PT/INR: PT 15.4 , INR 1.32       [07-08-22 @ 06:08]  PTT: 39.9       [07-08-22 @ 06:08]      Creatinine Trend:  SCr 1.45 [07-08 @ 06:08]  SCr 1.46 [07-08 @ 00:32]  SCr 1.56 [07-07 @ 15:43]  SCr 1.55 [07-07 @ 06:17]  SCr 1.53 [07-06 @ 22:55]            Urinalysis - [07-03-22 @ 14:32]      Color Light Yellow / Appearance Clear / SG 1.018 / pH 8.0      Gluc Negative / Ketone Negative  / Bili Negative / Urobili Negative       Blood Negative / Protein Trace / Leuk Est Negative / Nitrite Negative      RBC 1 / WBC 2 / Hyaline 0 / Gran  / Sq Epi  / Non Sq Epi 1 / Bacteria Negative      Iron 17, TIBC 171, %sat 10      [05-02-22 @ 13:03]  Ferritin 1505      [05-02-22 @ 13:05]  PTH -- (Ca 9.2)      [02-05-22 @ 10:13]   294  HbA1c 6.0      [02-21-20 @ 08:53]  TSH 4.69      [07-06-22 @ 18:36]      Syphilis Screen (Treponema Pallidum Ab) Negative      [06-27-22 @ 02:00]

## 2022-07-08 NOTE — PROGRESS NOTE ADULT - ASSESSMENT
66M with h/o DM type II, HTN, CAD s/p CABG in 2020, Afib on Eliquis, CVA in 2019 due to Afib, Seizure d/o (last episode was on 4/8/22), h/o GI bleed in 2/2022 EGD with duodenal ulcer and ESRD on HD s/p R DDRT from DCD donor on 4/21/22 complicated by DGF requiring HD until 4/29/22 now with good graft function who presented with status epilepticus in setting of UTI/antibiotics and sub-therapeutic valproic acid level     Seizure Disorder  - repeat MRI head 6/27 with less concerns for PRES, likely ischemic changes  - remains seizure free  - Antiepileptic regimen per Neurology, please follow recs daily.   - start Seroquel QHS as QTC permits.    - Keep Mag > 2  - infectious w/u negative so far, off ABX.  Transplant ID following, appreciate recs    R DCD DDRT (ureter stented) 4/21/22  - renal function stable  - Strict I/Os, continue tucker. Doxazosin/Proscar. TOV as able  - TFs via NGT   - No PEG per discussion with son   - STENT REMOVAL this admission- seen in RLQ on AXR 7/5  - OOB with RN/PT    Hyperkalemia:   - Trend labs  - can stop Lokelma  - Consider Florinef if persistent hyperkalemia     Immunosuppression:   - Belatacept--will re-load today, 7/18 as next dose (there was a gap between initial loading doses 2/2 seizures)  - MMF to 1g BID  - continue Pred 5  - PPx:  Mepron/Valcyte/PPI    DM  - on Lantus/Lispro, Endocrine following     AFib:  - continue Retacrit weekly  - Eliquis with ~40% less efficacy while on fosphenytoin.  Switch back to Lovenox 60BID   - rate controlled    R IJ DVT  - as above  - check LUE doppler as able    HTN:  - continue Norvasc/Hydralazine/Cardura

## 2022-07-08 NOTE — PROGRESS NOTE ADULT - ASSESSMENT
65 y/o male w/ a PMHx of CAD s/p CABG, AF on Eliquis c/b CVA c/b seizures, HTN, HLD, DM type II, hypothyroidism, GI bleed due to a duodenal ulcer, and ESRD s/p DDRT c/b DGF requiring HD & large perinephric hematoma s/p evacuation w/ revision of arterial anastomosis who presented in status epilepticus requiring intubation for airway protection with a hospital course c/b large right pleural effusion s/p thoracentesis c/b hemothorax while being anticoagulated & requiring intubation for airway protection s/p chest tube placement w/ CT demonstrating retained hemothorax s/p right VATS, status epilepticus again causing AMS requiring intubation for airway protection again, delirium, dysphagia requiring NGT placement for enteral access, hyperkalemia, and poor glycemic control.    PLAN:  Neuro: 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 c/b status epilepticus, delirium  - Assess neurological status every 4 hours in the setting of seizures  - Fosphenytoin 100 BID, d/c fycompa as per neuro   - vEEG showed no evidence of seizures and subsequently d/darren   - no documented seizures since 6/24, MRI negative, and infx workup/LP negative to date   -  Vitamin B12, folate, Vitamin B1,  homocysteine, TSH wnl. f/u homocysteine levels   - persistent intermittent agitation/ delirium   -melatonin qhs added   - Pain control if needed w/ acetaminophen    Resp: intubated for airway protection x3 (resolved), large right pleural effusion s/p thoracentesis c/b hemothorax while being anticoagulated s/p chest tube placement c/b retained hemothorax s/p right VATS  - Out of bed to chair, incentive spirometry, and aggressive chest PT to prevent atelectasis  -sats adequately on RA     CV: CAD, AF, HTN  - HD stable off pressors  - Amlodipine and hydralazine for HTN  - Continue atorvastatin    GI: ileus, h/o dysphagia  - +BM today, ileus resolved  - cont TF  - cont bowel regimen   - Patient's family appears to be agreeable to PEG but given patient's agitation, high risk for inadvertent removal so will need to reassess w/ family  - Protonix for stress ulcer prophylaxis    Renal: hyperkalemia; s/p DDRT, urinary retention  - Immunosuppression as per transplant surgery: currently on MMF, Belatacept & steroids  - Doxazosin 4 for urinary retention  -finasteride added   - IV locked  - tucker in place for retention. Making adequate UO.  -Crn continues to improve  -hyperK resolved. lokelma d/darren     Heme: anemia of chronic diseases  - Monitor CBC and coags  - cont eliquis per transplant   - Epogen for anemia of chronic diseases    ID: UTI, immunosuppression  - Monitor WBC, temperature, and procalcitonin  - Fluconazole, Valcyte, atovaquone for immunosuppression prophylaxis  - Holding Bactrim for hyperkalemia    Endo: DM type II, hypothyroidism  - Monitor glucose w/ ISS q6hrs for glycemic control; HgbA1C 6.6% on 4/24  - If FS persistently >180, will restart NPH as per endo   - Synthroid for hypothyroidism

## 2022-07-09 NOTE — PROGRESS NOTE ADULT - SUBJECTIVE AND OBJECTIVE BOX
[facility] DIVISION OF KIDNEY DISEASES AND HYPERTENSION -- FOLLOW UP NOTE  --------------------------------------------------------------------------------  Chief Complaint:    24 hour events/subjective:  Patient was seen and examined at bedside      PAST HISTORY  --------------------------------------------------------------------------------  No significant changes to PMH, PSH, FHx, SHx, unless otherwise noted    ALLERGIES & MEDICATIONS  --------------------------------------------------------------------------------  Allergies    No Known Allergies    Intolerances      Standing Inpatient Medications  amLODIPine   Tablet 10 milliGRAM(s) Oral <User Schedule>  atorvastatin 40 milliGRAM(s) Oral at bedtime  atovaquone  Suspension 1500 milliGRAM(s) Oral <User Schedule>  chlorhexidine 2% Cloths 1 Application(s) Topical <User Schedule>  doxazosin 4 milliGRAM(s) Oral daily  enoxaparin Injectable 60 milliGRAM(s) SubCutaneous every 12 hours  epoetin cortney-epbx (RETACRIT) Injectable 85958 Unit(s) SubCutaneous every 7 days  finasteride 5 milliGRAM(s) Oral daily  fosphenytoin IVPB 100 milliGRAM(s) PE IV Intermittent every 12 hours  hydrALAZINE 100 milliGRAM(s) Oral every 8 hours  insulin lispro (ADMELOG) corrective regimen sliding scale   SubCutaneous every 6 hours  levothyroxine Injectable 40 MICROGram(s) IV Push at bedtime  melatonin 6 milliGRAM(s) Oral at bedtime  mycophenolate mofetil Suspension 1000 milliGRAM(s) Enteral Tube <User Schedule>  pantoprazole   Suspension 40 milliGRAM(s) Oral daily  polyethylene glycol 3350 17 Gram(s) Oral daily  predniSONE  Solution 5 milliGRAM(s) Oral daily  sodium zirconium cyclosilicate 10 Gram(s) Oral daily  valGANciclovir 50 mG/mL Oral Solution 450 milliGRAM(s) Oral daily    PRN Inpatient Medications  acetaminophen    Suspension .. 650 milliGRAM(s) Enteral Tube every 6 hours PRN      REVIEW OF SYSTEMS- unable to obtain       VITALS/PHYSICAL EXAM  --------------------------------------------------------------------------------  T(C): 36.2 (07-09-22 @ 08:00), Max: 36.6 (07-08-22 @ 11:00)  HR: 59 (07-09-22 @ 09:00) (53 - 62)  BP: 166/72 (07-09-22 @ 09:00) (117/81 - 174/71)  RR: 19 (07-09-22 @ 09:00) (13 - 27)  SpO2: 98% (07-09-22 @ 09:00) (96% - 100%)  Wt(kg): --        07-08-22 @ 07:01  -  07-09-22 @ 07:00  --------------------------------------------------------  IN: 1660 mL / OUT: 1255 mL / NET: 405 mL    07-09-22 @ 07:01  -  07-09-22 @ 10:06  --------------------------------------------------------  IN: 110 mL / OUT: 90 mL / NET: 20 mL      Physical Exam:              Gen: NAD  	HEENT: Anicteric, no JVD  	Pulm: CTA B/L  	CV: RRR, S1S2  	Abd: +BS, soft, nontender/nondistended              Extremities: no bilateral LE edema, UE edema b/l              Neuro: lethargic, barely arousable  	Skin: Warm, without rashes              Miranda with clear urine              Transplant: No tenderness, swelling      LABS/STUDIES  --------------------------------------------------------------------------------              9.3    5.05  >-----------<  303      [07-09-22 @ 06:02]              30.3     140  |  105  |  22  ----------------------------<  161      [07-09-22 @ 06:02]  4.1   |  24  |  1.25        Ca     8.5     [07-09-22 @ 06:02]      Mg     2.1     [07-09-22 @ 06:02]      Phos  3.7     [07-09-22 @ 06:02]    TPro  5.8  /  Alb  2.2  /  TBili  0.2  /  DBili  0.1  /  AST  16  /  ALT  10  /  AlkPhos  144  [07-09-22 @ 06:02]    PT/INR: PT 13.4 , INR 1.16       [07-09-22 @ 06:02]  PTT: 41.5       [07-09-22 @ 06:02]      Creatinine Trend:  SCr 1.25 [07-09 @ 06:02]  SCr 1.34 [07-08 @ 18:06]  SCr 1.45 [07-08 @ 06:08]  SCr 1.46 [07-08 @ 00:32]  SCr 1.56 [07-07 @ 15:43]        Urinalysis - [07-03-22 @ 14:32]      Color Light Yellow / Appearance Clear / SG 1.018 / pH 8.0      Gluc Negative / Ketone Negative  / Bili Negative / Urobili Negative       Blood Negative / Protein Trace / Leuk Est Negative / Nitrite Negative      RBC 1 / WBC 2 / Hyaline 0 / Gran  / Sq Epi  / Non Sq Epi 1 / Bacteria Negative      Iron 17, TIBC 171, %sat 10      [05-02-22 @ 13:03]  Ferritin 1505      [05-02-22 @ 13:05]  PTH -- (Ca 9.2)      [02-05-22 @ 10:13]   294  HbA1c 6.0      [02-21-20 @ 08:53]  TSH 4.69      [07-06-22 @ 18:36]      Syphilis Screen (Treponema Pallidum Ab) Negative      [06-27-22 @ 02:00]

## 2022-07-09 NOTE — PROGRESS NOTE ADULT - ASSESSMENT
66 year old male with PMH of CAD s/p CABG in 2020, AF on Eliquis c/b CVA in 2019 c/b seizures, HTN, HLD, DM type II, hypothyroidism, GI bleed due to a duodenal ulcer (2/2022), and ESRD s/p DDRT (4/21/22) c/b DGF requiring HD (last session 4/29/22) admitted with  status epilepticus    1.DCD DDRT on 4/21/220- Allograft function is stable   2.IS meds- on Belatacept  (concern for PRES & seizures) Last dose was on 7/4 and then further doses were held. Loading dose of belatacept given again on 7/8  continue   mg BID and  Prednisone 5 mg po daily   3.Hyperkalemia- resolved on Lokelma, bactrim was d/c and switched  to mepron  4. Altered mentation-  Neurology is following. on vEEG and AED management .

## 2022-07-09 NOTE — PROGRESS NOTE ADULT - ASSESSMENT
66M with h/o DM type II, HTN, CAD s/p CABG in 2020, Afib on Eliquis, CVA in 2019 due to Afib, Seizure d/o (last episode was on 4/8/22), h/o GI bleed in 2/2022 EGD with duodenal ulcer and ESRD on HD s/p R DDRT from DCD donor on 4/21/22 complicated by DGF requiring HD until 4/29/22 now with good graft function who presented with status epilepticus in setting of UTI/antibiotics and sub-therapeutic valproic acid level     Seizure Disorder  - repeat MRI head 6/27 with less concerns for PRES, likely ischemic changes  - remains seizure free  - Antiepileptic regimen per Neurology, please follow recs daily.   - start Seroquel QHS as QTC permits.    - Keep Mag > 2  - infectious w/u negative so far, has been off ABX.      R DCD DDRT (ureter stented) 4/21/22  - excellent renal function  - will plan for removal of ureteral stent on 7/11 AM.  pre-op mode night before.  hold Lovenox Sunday night and restart after  - Strict I/Os, continue tucker. Doxazosin/Proscar. TOV today-->condom cath  - TFs via NGT, would hold off on S&S, periods of coughing with secretions. high aspiration risk. will re-assess daily.  - No PEG at this time per family  - OOB with RN/PT    Hyperkalemia:   - resolved. stop Lokelma    Immunosuppression:   - Belatacept: 6/23, 7/4 (initial loading).  Re-loaded 7/8 as there was a gap between doses 2/2 seizures. next dose 7/18  - MMF 1g BID  - continue Pred 5  - PPx:  Mepron/Valcyte/PPI    DM  - on Lantus/Lispro, Endocrine following     AFib:  - continue Retacrit weekly  - Eliquis with ~40% less efficacy while on fosphenytoin.  Switched back to Lovenox 60BID. HOLD SUNDAY NIGHT   - rate controlled    R IJ DVT  - as above  - check LUE doppler as able    HTN:  - continue Norvasc/Hydralazine/Cardura     66M with h/o DM type II, HTN, CAD s/p CABG in 2020, Afib on Eliquis, CVA in 2019 due to Afib, Seizure d/o (last episode was on 4/8/22), h/o GI bleed in 2/2022 EGD with duodenal ulcer and ESRD on HD s/p R DDRT from DCD donor on 4/21/22 complicated by DGF requiring HD until 4/29/22 now with good graft function who presented with status epilepticus in setting of UTI/antibiotics and sub-therapeutic valproic acid level     Seizure Disorder  - repeat MRI head 6/27 with less concerns for PRES, likely ischemic changes  - remains seizure free  - Antiepileptic regimen per Neurology, please follow recs daily. Will discuss PO options once tolerates PO  - start Seroquel QHS as QTC permits.    - Keep Mag > 2  - infectious w/u negative so far, has been off ABX.      R DCD DDRT (ureter stented) 4/21/22  - excellent renal function  - will plan for removal of ureteral stent on 7/11 AM.  pre-op mode night before.  hold Lovenox Sunday night and restart after  - Strict I/Os, continue tucker. Doxazosin/Proscar. TOV today-->condom cath  - TFs via NGT, would hold off on S&S, periods of coughing with secretions. high aspiration risk. will re-assess daily.  - No PEG at this time per family  - OOB with RN/PT    Hyperkalemia:   - resolved. stop Lokelma    Immunosuppression:   - Belatacept: 6/23, 7/4 (initial loading).  Re-loaded 7/8 as there was a gap between doses 2/2 seizures. next dose 7/18  - MMF 1g BID  - continue Pred 5  - PPx:  Mepron/Valcyte/PPI    DM  - on Lantus/Lispro, Endocrine following     AFib:  - continue Retacrit weekly  - Eliquis with ~40% less efficacy while on fosphenytoin.  Switched back to Lovenox 60BID. HOLD SUNDAY NIGHT   - rate controlled    R IJ DVT  - as above  - check LUE doppler as able    HTN:  - continue Norvasc/Hydralazine/Cardura

## 2022-07-09 NOTE — PROGRESS NOTE ADULT - SUBJECTIVE AND OBJECTIVE BOX
SICU Daily Progress Note  =====================================================  Interval/Overnight Events:       HPI:    Allergies: No Known Allergies      MEDICATIONS:   --------------------------------------------------------------------------------------  Neurologic Medications  acetaminophen    Suspension .. 650 milliGRAM(s) Enteral Tube every 6 hours PRN Mild Pain (1 - 3)  fosphenytoin IVPB 100 milliGRAM(s) PE IV Intermittent every 12 hours  melatonin 6 milliGRAM(s) Oral at bedtime    Respiratory Medications    Cardiovascular Medications  amLODIPine   Tablet 10 milliGRAM(s) Oral <User Schedule>  doxazosin 4 milliGRAM(s) Oral daily  hydrALAZINE 100 milliGRAM(s) Oral every 8 hours    Gastrointestinal Medications  pantoprazole   Suspension 40 milliGRAM(s) Oral daily  polyethylene glycol 3350 17 Gram(s) Oral daily    Genitourinary Medications    Hematologic/Oncologic Medications  enoxaparin Injectable 60 milliGRAM(s) SubCutaneous every 12 hours  epoetin cortney-epbx (RETACRIT) Injectable 91090 Unit(s) SubCutaneous every 7 days  mycophenolate mofetil Suspension 1000 milliGRAM(s) Enteral Tube <User Schedule>    Antimicrobial/Immunologic Medications  atovaquone  Suspension 1500 milliGRAM(s) Oral <User Schedule>  valGANciclovir 50 mG/mL Oral Solution 450 milliGRAM(s) Oral daily    Endocrine/Metabolic Medications  atorvastatin 40 milliGRAM(s) Oral at bedtime  finasteride 5 milliGRAM(s) Oral daily  insulin lispro (ADMELOG) corrective regimen sliding scale   SubCutaneous every 6 hours  levothyroxine Injectable 40 MICROGram(s) IV Push at bedtime  predniSONE  Solution 5 milliGRAM(s) Oral daily    Topical/Other Medications  chlorhexidine 2% Cloths 1 Application(s) Topical <User Schedule>  sodium zirconium cyclosilicate 10 Gram(s) Oral daily    --------------------------------------------------------------------------------------    VITAL SIGNS, INS/OUTS (last 24 hours):  --------------------------------------------------------------------------------------  T(C): 36.4 (07-09-22 @ 05:00), Max: 36.6 (07-08-22 @ 11:00)  HR: 60 (07-09-22 @ 06:00) (53 - 62)  BP: 136/61 (07-09-22 @ 05:00) (117/81 - 174/71)  RR: 15 (07-09-22 @ 06:00) (14 - 27)  SpO2: 96% (07-09-22 @ 06:00) (96% - 100%)    07-07-22 @ 07:01  -  07-08-22 @ 07:00  --------------------------------------------------------  IN: 1390 mL / OUT: 815 mL / NET: 575 mL    07-08-22 @ 07:01  -  07-09-22 @ 06:33  --------------------------------------------------------  IN: 1620 mL / OUT: 1155 mL / NET: 465 mL      --------------------------------------------------------------------------------------    EXAM  NEUROLOGY  Exam: Normal, NAD, alert, oriented x3, no focal deficits.    HEENT  Exam: Normocephalic, atraumatic, EOMI.     RESPIRATORY  Exam: Normal expansion/effort.  Mechanical Ventilation:     CARDIOVASCULAR  Exam: Regular rate and rhythm.       GI/NUTRITION  Exam: Abdomen soft, Non-tender, Non-distended.     VASCULAR  Exam: Extremities warm, pink, well-perfused.     MUSCULOSKELETAL  Exam: All extremities moving spontaneously without limitations.     SKIN  Exam: Good skin turgor, no skin breakdown.       LABS  --------------------------------------------------------------------------------------                        9.3    5.05  )-----------( 303      ( 09 Jul 2022 06:02 )             30.3   07-08    142  |  105  |  24<H>  ----------------------------<  193<H>  3.9   |  26  |  1.34<H>    Ca    8.3<L>      08 Jul 2022 18:06  Phos  2.8     07-08  Mg     2.2     07-08    TPro  5.8<L>  /  Alb  2.3<L>  /  TBili  0.2  /  DBili  0.1  /  AST  15  /  ALT  11  /  AlkPhos  140<H>  07-08  PT/INR - ( 09 Jul 2022 06:02 )   PT: 13.4 sec;   INR: 1.16 ratio         PTT - ( 09 Jul 2022 06:02 )  PTT:41.5 sec  --------------------------------------------------------------------------------------     SICU Daily Progress Note  =====================================================  Interval/Overnight Events:     -  Started Lovenox, d/c Eliquis due to interference with Fosphenytoin     HPI:  65 y/o male w/ a PMHx of CAD s/p CABG (2020), AF on Eliquis c/b CVA (2019) c/b seizures, HTN, HLD, DM type II, hypothyroidism, GI bleed due to a duodenal ulcer (2/2022), and ESRD s/p DDRT (4/21/22) c/b DGF requiring HD (last session 4/29/22) & large perinephric hematoma s/p evacuation w/ revision of arterial anastomosis (5/2/22) who presented on 6/10/22 for status epilepticus. Patient was intubated for airway protection. AEDs were adjusted and he was successfully extubated on 6/11. Patient was noted to have a large right pleural effusion so thoracentesis was performed w/ 1.3 L of serosanguineous fluid drained. Patient was started on a heparin infusion for his atrial fibrillation post-procedure but had a H&H drop w/ CXR demonstrating complete right lung whiteout concerning for a hemothorax. Patient was transferred to SICU for further management. He was intubated for airway protection again, transfused several products, and reversed w/ protamine. A right 28 Fr chest tube was placed w/ a significant amount of sanguineous fluid drained. He was noted to have a significant amount of residual hemothorax on CT on 6/12 so he eventually was taken to the OR on 6/15 for a right VATS for hematoma washout and placement of a second chest tube. He was transferred to the floors on 6/17. Both chest tubes were removed on 6/18. He went into status epilepticus again on 6/25 so he was readmitted to SICU. He was intubated for airway protection again on 6/26 and successfully extubated on 6/28. Hospital course has also been significant for delirium, dysphagia requiring NGT placement for enteral access, hyperkalemia, and poor glycemic control. Patient currently denies headache, fevers, chills, dizziness, weakness, shortness of breath, chest pain, abdominal pain, or nausea/vomiting.      Allergies: No Known Allergies      MEDICATIONS:   --------------------------------------------------------------------------------------  Neurologic Medications  acetaminophen    Suspension .. 650 milliGRAM(s) Enteral Tube every 6 hours PRN Mild Pain (1 - 3)  fosphenytoin IVPB 100 milliGRAM(s) PE IV Intermittent every 12 hours  melatonin 6 milliGRAM(s) Oral at bedtime    Respiratory Medications    Cardiovascular Medications  amLODIPine   Tablet 10 milliGRAM(s) Oral <User Schedule>  doxazosin 4 milliGRAM(s) Oral daily  hydrALAZINE 100 milliGRAM(s) Oral every 8 hours    Gastrointestinal Medications  pantoprazole   Suspension 40 milliGRAM(s) Oral daily  polyethylene glycol 3350 17 Gram(s) Oral daily    Genitourinary Medications    Hematologic/Oncologic Medications  enoxaparin Injectable 60 milliGRAM(s) SubCutaneous every 12 hours  epoetin cortney-epbx (RETACRIT) Injectable 69814 Unit(s) SubCutaneous every 7 days  mycophenolate mofetil Suspension 1000 milliGRAM(s) Enteral Tube <User Schedule>    Antimicrobial/Immunologic Medications  atovaquone  Suspension 1500 milliGRAM(s) Oral <User Schedule>  valGANciclovir 50 mG/mL Oral Solution 450 milliGRAM(s) Oral daily    Endocrine/Metabolic Medications  atorvastatin 40 milliGRAM(s) Oral at bedtime  finasteride 5 milliGRAM(s) Oral daily  insulin lispro (ADMELOG) corrective regimen sliding scale   SubCutaneous every 6 hours  levothyroxine Injectable 40 MICROGram(s) IV Push at bedtime  predniSONE  Solution 5 milliGRAM(s) Oral daily    Topical/Other Medications  chlorhexidine 2% Cloths 1 Application(s) Topical <User Schedule>  sodium zirconium cyclosilicate 10 Gram(s) Oral daily    --------------------------------------------------------------------------------------    VITAL SIGNS, INS/OUTS (last 24 hours):  --------------------------------------------------------------------------------------  T(C): 36.4 (07-09-22 @ 05:00), Max: 36.6 (07-08-22 @ 11:00)  HR: 60 (07-09-22 @ 06:00) (53 - 62)  BP: 136/61 (07-09-22 @ 05:00) (117/81 - 174/71)  RR: 15 (07-09-22 @ 06:00) (14 - 27)  SpO2: 96% (07-09-22 @ 06:00) (96% - 100%)    07-07-22 @ 07:01  -  07-08-22 @ 07:00  --------------------------------------------------------  IN: 1390 mL / OUT: 815 mL / NET: 575 mL    07-08-22 @ 07:01  -  07-09-22 @ 06:33  --------------------------------------------------------  IN: 1620 mL / OUT: 1155 mL / NET: 465 mL      --------------------------------------------------------------------------------------    EXAM  GENERAL:   NAD, well-groomed, well-developed  HEAD:    Atraumatic, Normocephalic  EYES:   3mm PERRLA, conjunctiva and sclera clear  ENMT:   No oropharyngeal exudates, erythema or lesions,  Moist mucous membranes  NECK:   Supple, no cervical lymphadenopathy, no JVD  NERVOUS SYSTEM:  Intermittently follows commands. Alert & Oriented X2 to name and place, CN II-XII intact, Moves all extremities  ; Right Upper extremity 4/5, Left Upper extremity 5/5; Lower extremities 5/5, full sensation to light touch   CHEST/LUNG:   Breath sounds bilaterally without crackles, rhonchi, wheezes, rubs.  HEART:   cardiac monitor NSR   ; S1/S2 without murmurs, without rubs, or gallops.  ABDOMEN:   Soft, Nontender, Nondistended; Bowel sounds present, Bladder non distended, non palpable  EXTREMITIES:   Pulses palpable radial pulses 2+ bilat, DP/PT 1+/1+ bilat, without clubbing, cyanosis. Digits warm to touch with good cap refill < 3 secs  SKIN:   warm, dry, intact, normal color, no rash or abnormal lesions, without palpable nodes        LABS  --------------------------------------------------------------------------------------                        9.3    5.05  )-----------( 303      ( 09 Jul 2022 06:02 )             30.3   07-08    142  |  105  |  24<H>  ----------------------------<  193<H>  3.9   |  26  |  1.34<H>    Ca    8.3<L>      08 Jul 2022 18:06  Phos  2.8     07-08  Mg     2.2     07-08    TPro  5.8<L>  /  Alb  2.3<L>  /  TBili  0.2  /  DBili  0.1  /  AST  15  /  ALT  11  /  AlkPhos  140<H>  07-08  PT/INR - ( 09 Jul 2022 06:02 )   PT: 13.4 sec;   INR: 1.16 ratio         PTT - ( 09 Jul 2022 06:02 )  PTT:41.5 sec  --------------------------------------------------------------------------------------

## 2022-07-09 NOTE — PROGRESS NOTE ADULT - ATTENDING COMMENTS
Pt seen and examined with SICU team, agree with above.    1. Altered mental status and agitation, recent status epilepticus, seizure disorder:  - Home meds: divalproex 750 qam/ 500 qpm, Keppra 250mg bid  - No further seizures  - Currently on fosphenytoin, perampanel was stopped. Mental status has been improving, less agitation. Slept well last night.    - Pt has been tolerating tube feeds. As mental status has improved, will discuss swallow eval and potentially removing feeding tube with Tx.    2. ESRD s/p renal transplant 4/21:  - Home meds: cellcept 500mg bid, envarsus XR 3mg daily, prednisone 5mg daily, fluconazole 200mg daily, bactrim 400mg/80mg daily, valcyte 450mg three times per week  - Hyperkalemia resolved, now off lokelma  - D/c tucker    3. Essential HTN:  - Home meds: norvasc 10mg daily, coreg 6.25mg bid  - Continue norvasc  - Coreg being held for bradycardia during this admission  - Hydralazine added for better BP control    4. Paroxysmal atrial fibrillation with h/o CVA:  - Home meds: Eliquis 2.5mg bid, coreg 6.25mg bid  - Therapeutic lovenox restarted instead of Eliquis due to potential drug interactions    5. HLD:  - Home meds: atorvastatin 40mg  - Continue    6. DM type 2 on long-term insulin, with highly variable fsg during this admission:  - Home meds: Humalog 5 units qac, glargine 6units qhs  - HbA1c 6%  - Monitor fsg for fluctuations.    7. GERD:  - Home meds: protonix 40mg daily    8. Acquired hypothyroidism:  - Home meds: Synthroid 50mcg daily

## 2022-07-09 NOTE — PROGRESS NOTE ADULT - ASSESSMENT
65 y/o male w/ a PMHx of CAD s/p CABG, AF on Eliquis c/b CVA c/b seizures, HTN, HLD, DM type II, hypothyroidism, GI bleed due to a duodenal ulcer, and ESRD s/p DDRT c/b DGF requiring HD & large perinephric hematoma s/p evacuation w/ revision of arterial anastomosis who presented in status epilepticus requiring intubation for airway protection with a hospital course c/b large right pleural effusion s/p thoracentesis c/b hemothorax while being anticoagulated & requiring intubation for airway protection s/p chest tube placement w/ CT demonstrating retained hemothorax s/p right VATS, status epilepticus again causing AMS requiring intubation for airway protection again, delirium, dysphagia requiring NGT placement for enteral access, hyperkalemia, and poor glycemic control.    PLAN:  Neuro: 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 c/b status epilepticus, delirium  - Pain control if needed w/ acetaminophen  - Assess neurological status every 4 hours in the setting of seizures  - vEEG showed no evidence of seizures and subsequently d/darren   - no documented seizures since 6/24, MRI negative, and infection workup/LP negative to date  - Fosphenytoin 100 BID   - Vitamin B12, folate, Vitamin B1,  homocysteine, TSH wnl  - melatonin qhs for insomnia     Resp: intubated for airway protection x3 (resolved), large right pleural effusion s/p thoracentesis c/b hemothorax while being anticoagulated s/p chest tube placement c/b retained hemothorax s/p right VATS  - Out of bed to chair, incentive spirometry, and aggressive chest PT to prevent atelectasis  - Maintain SpO2 >92%     CV: CAD, AF, HTN  - Maintain MAP > 65   - Amlodipine and hydralazine for HTN  - Continue atorvastatin    GI: ileus, h/o dysphagia, ileus resolved   - Tolerating TF, will f/u speech and swallow   - continue bowel regimen with miralax  - Patient's family appears to be agreeable to PEG but given patient's agitation, high risk for inadvertent removal, will f/u with family  - Protonix for stress ulcer prophylaxis    Renal: hyperkalemia; s/p DDRT, urinary retention  - Immunosuppression as per transplant surgery: currently on MMF, Belatacept & steroids  - Miranda in place for retention; monitor I & Os  - Continue Doxazosin and Finasteride   - K downtrending cont Lokelma until 7/9     Heme: anemia of chronic diseases  - Monitor CBC and coags  - Started Lovenox, d/c Eliquis due to interference with Fosphenytoin   - Epogen for anemia of chronic diseases    ID: UTI, immunosuppression  - Monitor WBC, temperature, and procalcitonin  - Valcyte, Atovaquone for immunosuppression prophylaxis  - Holding Bactrim for hyperkalemia    Endo: DM type II, hypothyroidism  - Monitor glucose w/ ISS q6hrs for glycemic control; HgbA1C 6.6% on 4/24  - If FS persistently >180, will restart NPH as per endo   - Synthroid for hypothyroidism

## 2022-07-09 NOTE — PROGRESS NOTE ADULT - SUBJECTIVE AND OBJECTIVE BOX
[facility] [place] [facility]   MD NINA MASON PA MIJUNG SHIN NP     TEL:  OFFICE: [phone]  DR HSU CELL: [phone]  DR. MARQUEZ CELL: [phone]  MARILYNN MORELOS CELL: [phone]  NP Lillian Buchanan CELL: [phone]    From 5pm-7am Answering Service [phone]    -- RENAL FOLLOW UP NOTE ---Date of Service 07-09-22 @ 15:49    Patient is a 66y old  Male who presents with a chief complaint of Status Epilepticus (09 Jul 2022 13:49)      Patient seen and examined at bedside. No chest pain/sob    VITALS:  T(F): 97.3 (07-09-22 @ 12:00), Max: 97.5 (07-09-22 @ 05:00)  HR: 66 (07-09-22 @ 15:00)  BP: 149/65 (07-09-22 @ 14:00)  RR: 25 (07-09-22 @ 15:00)  SpO2: 98% (07-09-22 @ 15:00)  Wt(kg): --    07-08 @ 07:01  -  07-09 @ 07:00  --------------------------------------------------------  IN: 1660 mL / OUT: 1255 mL / NET: 405 mL    07-09 @ 07:01  -  07-09 @ 15:49  --------------------------------------------------------  IN: 350 mL / OUT: 280 mL / NET: 70 mL          PHYSICAL EXAM:  Constitutional: NAD  Neck: No JVD  Respiratory: CTAB, no wheezes, rales or rhonchi  Cardiovascular: S1, S2, RRR  Gastrointestinal: on NG tube, BS+, soft, NT/ND  Extremities: No LE edema  Vascular access: no HD cath    Hospital Medications:   MEDICATIONS  (STANDING):  amLODIPine   Tablet 10 milliGRAM(s) Oral <User Schedule>  atorvastatin 40 milliGRAM(s) Oral at bedtime  atovaquone  Suspension 1500 milliGRAM(s) Oral <User Schedule>  chlorhexidine 2% Cloths 1 Application(s) Topical <User Schedule>  doxazosin 4 milliGRAM(s) Oral daily  enoxaparin Injectable 60 milliGRAM(s) SubCutaneous every 12 hours  epoetin cortney-epbx (RETACRIT) Injectable 31409 Unit(s) SubCutaneous every 7 days  finasteride 5 milliGRAM(s) Oral daily  fosphenytoin IVPB 100 milliGRAM(s) PE IV Intermittent every 12 hours  hydrALAZINE 100 milliGRAM(s) Oral every 8 hours  insulin lispro (ADMELOG) corrective regimen sliding scale   SubCutaneous every 6 hours  levothyroxine Injectable 40 MICROGram(s) IV Push at bedtime  melatonin 6 milliGRAM(s) Oral at bedtime  mycophenolate mofetil Suspension 1000 milliGRAM(s) Enteral Tube <User Schedule>  pantoprazole   Suspension 40 milliGRAM(s) Oral daily  polyethylene glycol 3350 17 Gram(s) Oral daily  predniSONE  Solution 5 milliGRAM(s) Oral daily  valGANciclovir 50 mG/mL Oral Solution 450 milliGRAM(s) Oral daily      LABS:  07-09    140  |  105  |  22  ----------------------------<  161<H>  4.1   |  24  |  1.25    Ca    8.5      09 Jul 2022 06:02  Phos  3.7     07-09  Mg     2.1     07-09    TPro  5.8<L>  /  Alb  2.2<L>  /  TBili  0.2  /  DBili  0.1  /  AST  16  /  ALT  10  /  AlkPhos  144<H>  07-09    Creatinine Trend: 1.25 <--, 1.34 <--, 1.45 <--, 1.46 <--, 1.56 <--, 1.55 <--, 1.53 <--, 1.61 <--, 1.71 <--, 1.69 <--, 1.66 <--, 1.67 <--, 1.60 <--, 1.65 <--, 1.52 <--, 1.52 <--, 1.50 <--, 1.51 <--, 1.49 <--, 1.49 <--, 1.45 <--, 1.45 <--    Albumin, Serum: 2.2 g/dL (07-09 @ 06:02)  Phosphorus Level, Serum: 3.7 mg/dL (07-09 @ 06:02)  Phosphorus Level, Serum: 2.8 mg/dL (07-08 @ 18:06)                              9.3    5.05  )-----------( 303      ( 09 Jul 2022 06:02 )             30.3     Urine Studies:  Urinalysis - [07-03-22 @ 14:32]      Color Light Yellow / Appearance Clear / SG 1.018 / pH 8.0      Gluc Negative / Ketone Negative  / Bili Negative / Urobili Negative       Blood Negative / Protein Trace / Leuk Est Negative / Nitrite Negative      RBC 1 / WBC 2 / Hyaline 0 / Gran  / Sq Epi  / Non Sq Epi 1 / Bacteria Negative      Iron 17, TIBC 171, %sat 10      [05-02-22 @ 13:03]  Ferritin 1505      [05-02-22 @ 13:05]  PTH -- (Ca 9.2)      [02-05-22 @ 10:13]   294  HbA1c 6.0      [02-21-20 @ 08:53]  TSH 4.69      [07-06-22 @ 18:36]      Syphilis Screen (Treponema Pallidum Ab) Negative      [06-27-22 @ 02:00]    RADIOLOGY & ADDITIONAL STUDIES:

## 2022-07-10 NOTE — PROVIDER CONTACT NOTE (OTHER) - ASSESSMENT
patient telemetry reading abnormal. telemetry is stating patient is having elevated ST V1. Patient is denying any chest pain or having any nonverbal s/s. Vitals stable. Tele leads, as well as wires, have been changed and monitor stopped reading "ST V1 high" for a few minutes and then comes back and stays consistent.

## 2022-07-10 NOTE — PROGRESS NOTE ADULT - ASSESSMENT
65 y/o male w/ a PMHx of CAD s/p CABG, AF on Eliquis c/b CVA c/b seizures, HTN, HLD, DM type II, hypothyroidism, GI bleed due to a duodenal ulcer, and ESRD s/p DDRT c/b DGF requiring HD & large perinephric hematoma s/p evacuation w/ revision of arterial anastomosis who presented in status epilepticus requiring intubation for airway protection with a hospital course c/b large right pleural effusion s/p thoracentesis c/b hemothorax while being anticoagulated & requiring intubation for airway protection s/p chest tube placement w/ CT demonstrating retained hemothorax s/p right VATS, status epilepticus again causing AMS requiring intubation for airway protection again, delirium, dysphagia requiring NGT placement for enteral access, hyperkalemia, and poor glycemic control.      PLAN:  Neuro: 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 c/b status epilepticus, delirium  - Pain control if needed w/ acetaminophen  - Assess neurological status every 4 hours in the setting of seizures  - vEEG showed no evidence of seizures and subsequently d/darren   - no documented seizures since 6/24, MRI negative, and infection workup/LP negative to date  - Fosphenytoin 100 BID  - Vitamin B12, folate, Vitamin B1,  homocysteine, TSH wnl  - melatonin qhs for insomnia     Resp: intubated for airway protection x3, now extubated, large right pleural effusion s/p thoracentesis c/b hemothorax while being anticoagulated s/p chest tube placement c/b retained hemothorax s/p right VATS  - Out of bed to chair, incentive spirometry, and aggressive chest PT to prevent atelectasis  - Maintain SpO2 >92%     CV: CAD, AF, HTN  - Maintain MAP > 65   - Amlodipine and hydralazine for HTN  - Continue atorvastatin    GI: ileus, h/o dysphagia, ileus resolved   - Tolerating TF  - Passed bedside S/S, advance diet per Transplant nephrology/surgery  - continue bowel regimen   - Protonix for stress ulcer prophylaxis    Renal: hyperkalemia; s/p DDRT, urinary retention  - Immunosuppression as per transplant surgery: currently on MMF, Belatacept & steroids  - Miranda Dc'd, failed TOV x1, SCx1, f/u 2nd TOV  - Continue Doxazosin and Finasteride       Heme: anemia of chronic diseases  - Monitor CBC and coags  - Started Lovenox, d/c Eliquis due to interference with Fosphenytoin   - Epogen for anemia of chronic diseases    ID: UTI, immunosuppression  - Monitor WBC, temperature, and procalcitonin  - Valcyte, Atovaquone for immunosuppression prophylaxis  - Holding Bactrim for hyperkalemia    Endo: DM type II, hypothyroidism  - Monitor glucose w/ ISS q6hrs for glycemic control; HgbA1C 6.6% on 4/24  - If FS persistently >180, will restart NPH as per endo   - Synthroid for hypothyroidism

## 2022-07-10 NOTE — PROGRESS NOTE ADULT - ASSESSMENT
66M with h/o DM type II, HTN, CAD s/p CABG in 2020, Afib on Eliquis, CVA in 2019 due to Afib, Seizure d/o (last episode was on 4/8/22), h/o GI bleed in 2/2022 EGD with duodenal ulcer and ESRD on HD s/p R DDRT from DCD donor on 4/21/22 complicated by DGF requiring HD until 4/29/22 now with good graft function who presented with status epilepticus in setting of UTI/antibiotics and sub-therapeutic valproic acid level     Seizure Disorder  - repeat MRI head 6/27 with less concerns for PRES, likely ischemic changes  - remains seizure free  - Antiepileptic regimen per Neurology, please follow recs daily. Will discuss PO options once tolerates PO  - start Seroquel QHS as QTC permits.    - Keep Mag > 2  - infectious w/u negative so far, has been off ABX.      R DCD DDRT (ureter stented) 4/21/22  - excellent renal function  - will plan for removal of ureteral stent tomorrow (Monday).  pre-op mode Tonight.  Hold Lovenox startinging tonight and restart after procedure.   - Strict I/Os, Continue Doxazosin/Proscar. Monitor UOP from condom cath  - Continue TFs via NGT, passed S&S, try sips as able  - No PEG at this time per family  - OOB with RN/PT    Hyperkalemia:   - resolved. Continue to monitor    Immunosuppression:   - Belatacept: 6/23, 7/4 (initial loading).  Re-loaded 7/8 as there was a gap between doses 2/2 seizures. next dose 7/18  - MMF 1g BID  - continue Pred 5  - PPx:  Mepron/Valcyte/PPI    DM  - on Lantus/Lispro, Endocrine following     AFib:  - continue Retacrit weekly  - Eliquis with ~40% less efficacy while on fosphenytoin.  Switched back to Lovenox 60BID. HOLD starting tonight for stent removal tomorow  - rate controlled    R IJ DVT  - as above  - check LUE doppler    HTN:  - continue Norvasc/Hydralazine/Cardura     66M with h/o DM type II, HTN, CAD s/p CABG in 2020, Afib on Eliquis, CVA in 2019 due to Afib, Seizure d/o (last episode was on 4/8/22), h/o GI bleed in 2/2022 EGD with duodenal ulcer and ESRD on HD s/p R DDRT from DCD donor on 4/21/22 complicated by DGF requiring HD until 4/29/22 now with good graft function who presented with status epilepticus in setting of UTI/antibiotics and sub-therapeutic valproic acid level     Seizure Disorder  - repeat MRI head 6/27 with less concerns for PRES, likely ischemic changes  - remains seizure free  - Antiepileptic regimen per Neurology, please follow recs daily. Will discuss PO options once tolerates PO  - start Seroquel QHS as QTC permits.    - Keep Mag > 2  - infectious w/u negative so far, has been off ABX.      R DCD DDRT (ureter stented) 4/21/22  - excellent renal function  - will plan for removal of ureteral stent tomorrow (Monday).  pre-op mode Tonight.  Hold Lovenox startinging tonight and restart after procedure.   - Strict I/Os, Continue Doxazosin/Proscar. Monitor UOP from condom cath  - Continue TFs via NGT, passed bedside S&S, try sips as able  - No PEG at this time per family  - OOB with RN/PT    Hyperkalemia:   - resolved. Continue to monitor    Immunosuppression:   - Belatacept: 6/23, 7/4 (initial loading).  Re-loaded 7/8 as there was a gap between doses 2/2 seizures. next dose 7/18  - MMF 1g BID  - continue Pred 5  - PPx:  Mepron/Valcyte/PPI    DM  - on Lantus/Lispro, Endocrine following     AFib:  - continue Retacrit weekly  - Eliquis with ~40% less efficacy while on fosphenytoin.  Switched back to Lovenox 60BID. HOLD starting tonight for stent removal tomorow  - rate controlled    R IJ DVT  - as above  - check LUE doppler    HTN:  - continue Norvasc/Hydralazine/Cardura

## 2022-07-10 NOTE — PROGRESS NOTE ADULT - SUBJECTIVE AND OBJECTIVE BOX
[facility] [place], [place] NEPHROLOGY PRACTICE   MD NINA MASON PA MIJUNG SHIN NP     TEL:  OFFICE: [phone]  DR HSU CELL: [phone]  DR. MARQUEZ CELL: [phone]  MARILYNN MORELOS CELL: [phone]  NP Lillian Buchanan CELL: [phone]    From 5pm-7am Answering Service [phone]    -- RENAL FOLLOW UP NOTE ---Date of Service 07-10-22 @ 16:42    Patient is a 66y old  Male who presents with a chief complaint of Status Epilepticus (10 Jul 2022 12:29)      Patient seen and examined at bedside. No chest pain/sob    VITALS:  T(F): 97.5 (07-10-22 @ 13:35), Max: 97.9 (07-10-22 @ 11:00)  HR: 65 (07-10-22 @ 13:35)  BP: 156/68 (07-10-22 @ 13:35)  RR: 16 (07-10-22 @ 13:35)  SpO2: 100% (07-10-22 @ 13:35)  Wt(kg): --    07-09 @ 07:01  -  07-10 @ 07:00  --------------------------------------------------------  IN: 1420 mL / OUT: 930 mL / NET: 490 mL    07-10 @ 07:01  -  07-10 @ 16:42  --------------------------------------------------------  IN: 440 mL / OUT: 425 mL / NET: 15 mL          PHYSICAL EXAM:  Constitutional: NAD  Neck: No JVD  Respiratory: CTAB, no wheezes, rales or rhonchi  Cardiovascular: S1, S2, RRR  Neurology: alert and awake  Gastrointestinal: BS+, soft, NT/ND  Extremities: No LE edema  Skin: warm and dry  Vascular access: no HD cath    Hospital Medications:   MEDICATIONS  (STANDING):  amLODIPine   Tablet 10 milliGRAM(s) Oral <User Schedule>  atorvastatin 40 milliGRAM(s) Oral at bedtime  atovaquone  Suspension 1500 milliGRAM(s) Oral <User Schedule>  chlorhexidine 2% Cloths 1 Application(s) Topical <User Schedule>  doxazosin 4 milliGRAM(s) Oral daily  enoxaparin Injectable 60 milliGRAM(s) SubCutaneous every 12 hours  epoetin cortney-epbx (RETACRIT) Injectable 40279 Unit(s) SubCutaneous every 7 days  finasteride 5 milliGRAM(s) Oral daily  fosphenytoin IVPB 100 milliGRAM(s) PE IV Intermittent every 12 hours  hydrALAZINE 100 milliGRAM(s) Oral every 8 hours  insulin lispro (ADMELOG) corrective regimen sliding scale   SubCutaneous every 6 hours  levothyroxine Injectable 40 MICROGram(s) IV Push at bedtime  melatonin 6 milliGRAM(s) Oral at bedtime  mycophenolate mofetil Suspension 1000 milliGRAM(s) Enteral Tube <User Schedule>  pantoprazole   Suspension 40 milliGRAM(s) Oral daily  polyethylene glycol 3350 17 Gram(s) Oral daily  predniSONE  Solution 5 milliGRAM(s) Oral daily  valGANciclovir 50 mG/mL Oral Solution 450 milliGRAM(s) Oral daily      LABS:  07-10    141  |  106  |  22  ----------------------------<  173<H>  4.1   |  24  |  1.17    Ca    8.5      10 Jul 2022 05:53  Phos  2.9     07-10  Mg     2.0     07-10    TPro  5.8<L>  /  Alb  2.3<L>  /  TBili  0.2  /  DBili  0.1  /  AST  18  /  ALT  11  /  AlkPhos  141<H>  07-10    Creatinine Trend: 1.17 <--, 1.25 <--, 1.25 <--, 1.34 <--, 1.45 <--, 1.46 <--, 1.56 <--, 1.55 <--, 1.53 <--, 1.61 <--, 1.71 <--, 1.69 <--, 1.66 <--, 1.67 <--, 1.60 <--, 1.65 <--, 1.52 <--, 1.52 <--, 1.50 <--, 1.51 <--, 1.49 <--, 1.49 <--    Phosphorus Level, Serum: 2.9 mg/dL (07-10 @ 05:53)  Albumin, Serum: 2.3 g/dL (07-10 @ 05:53)  Phosphorus Level, Serum: 3.3 mg/dL (07-09 @ 19:17)                              9.2    3.90  )-----------( 308      ( 10 Jul 2022 05:57 )             29.5     Urine Studies:  Urinalysis - [07-03-22 @ 14:32]      Color Light Yellow / Appearance Clear / SG 1.018 / pH 8.0      Gluc Negative / Ketone Negative  / Bili Negative / Urobili Negative       Blood Negative / Protein Trace / Leuk Est Negative / Nitrite Negative      RBC 1 / WBC 2 / Hyaline 0 / Gran  / Sq Epi  / Non Sq Epi 1 / Bacteria Negative      Iron 17, TIBC 171, %sat 10      [05-02-22 @ 13:03]  Ferritin 1505      [05-02-22 @ 13:05]  PTH -- (Ca 9.2)      [02-05-22 @ 10:13]   294  HbA1c 6.0      [02-21-20 @ 08:53]  TSH 4.69      [07-06-22 @ 18:36]      Syphilis Screen (Treponema Pallidum Ab) Negative      [06-27-22 @ 02:00]    RADIOLOGY & ADDITIONAL STUDIES:

## 2022-07-10 NOTE — PROGRESS NOTE ADULT - ASSESSMENT
66 yr old Male with PMHx ESRD s/p permacath removal 5/10/22 s/p Rt DDRT 4/21/22,  CVA (2019), Afib on apixaban, seizure activity, CAD s/p stents, CABG (2020), HTN, HLD, DM on insulin, gastric/duodenal ulcer, recent hospitalization 4/28/22 -5/10/22 with weakness/anemia found to have perinephric hematoma requiring evacuation and repair of bleeding arterial anastomosis. Curently being treated for UTI with Levaquin Today after developing multiple sz episodes refractory to 5 mg versed IM (EMS) and 2 mg ativan IV in E.D. concerning for status epilepticus requiring intubation for hypoxic respiratory  failure. MICU Consult was called for hypoxic respiratory failure secondary to status epilepticus.  Nephrology consulted for renal failure.     A/P  DDRT on 04/21/22  Course complicated by DGF, was on HD until 4/29/22. Readmitted in May for anemia in the setting of large perinephric hematoma s/p evacuation and repair of arterial anastomosis on 5/2/22. Intra operative biopsy showed no rejection.  ANA likely sec to  hemodynamic change   scr remains stable  optimize glucose   no respiratory distress - pt out from ICU today  monitor BMP, U/O     Hyperkalemia   resolved   monitor K closely     HTN   fluctuating   monitor BP closely     Immunosuppression  continue per transplant team

## 2022-07-10 NOTE — PROGRESS NOTE ADULT - SUBJECTIVE AND OBJECTIVE BOX
Transplant Surgery Multidisciplinary Progress Note   --------------------------------------------------------------  R DCD DDRT    4/21/22    Present: Patient seen and examined with multidisciplinary team including Transplant Surgeon: Dr. Barber,  Nephrologist: Dr. Mary Lomeli, [facility], unit RN during am rounds.  Disciplines not in attendance will be notified of the plan.     HPI: 66M with PMH: DM type II, HTN, CAD s/p CABG in 2020, AFib on Eliquis, CVA in 2019 due to Afib, Seizure d/o following CVA, last episode was on 4/8/22, h/o GIB in 2/2022 EGD with duodenal ulcer.  ESRD due to DM was on HD since 2019.     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  Donor was 58 , KDPI 82%, DCD, single vessels and ureter, HLA mismatch 1, 2, 2. No DSA, cPRA 0%. 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 Re-admitted in May for anemia in the setting of large perinephric hematoma s/p evacuation and repair of arterial anastomosis on 5/2/22. Intra operative biopsy showed no rejection, Creatinine ranging ~2mg/dL.    In Rehab: He was recently dx with klebsiella UTI rx with ertapenem - then switched to Levaquin day #6.    He also had parainfluenza pneumonia. Was at rehab progressing well.      Hospital course:  - 6/10: transferred from rehab facility for seizure status epilepticus. Intubated for respiratory protection and transferred to MICU.  EEG showed no seizure activity. Neuro consulted.   - 6/11: Extubated. Found to have R pleural effusion. s/p Thoracentesis 1.3L. Eliquis changed to heparin drip.  Post procedure drop in H&H. 5u PRBC, 2 u FFP.    - 6/11 evening: Transferred to SICU for further care in setting of hypoxia, significant R pleural effusion, hgb 6 and hemodynamic instability  - 6/11 Intubated at 9pm and sedated on Precedex.  CT placed, 600ml blood drained.  1L total overnight, started pressors  - 6/11 Received Protamine for PTT >100 (was on Heparin drip started for AF), 2 u FFP and 5u PRBC total  - 6/13 s/p VATS 2.2L old blood removed; second chest tube placed  - 6/18-19: chest tubes removed  - 6/21: ?PRES on MRI, off Envarsus, switched to Belatacept 6/23  - 6/25: Tx to SICU for refractory seizures    Interval Events:  - Afebrile, VSS  - mentation much improved today, AOX2 this AM.  -Overnight, initially failed TOV, was straight cathed once; passed TOV early this AM (Condom cath).   -Tolerating TFs at goal. Passed S&S yesterday.     Immunosuppression:   Induction:  Thymoglobulin                                        Maintenance:  - Belatacept: 6/23, 7/4 (initial loading).  Re-loaded 7/8 as there was a gap between doses 2/2 seizures. next dose 7/18  - MMF 1g BID  - continue Pred 5  - Ongoing monitoring for signs of rejection.    Potential Discharge date: pending clinical improvement  Education:  Medications  Plan of care:  See Below        MEDICATIONS  (STANDING):  amLODIPine   Tablet 10 milliGRAM(s) Oral <User Schedule>  atorvastatin 40 milliGRAM(s) Oral at bedtime  atovaquone  Suspension 1500 milliGRAM(s) Oral <User Schedule>  chlorhexidine 2% Cloths 1 Application(s) Topical <User Schedule>  doxazosin 4 milliGRAM(s) Oral daily  enoxaparin Injectable 60 milliGRAM(s) SubCutaneous every 12 hours  epoetin cortney-epbx (RETACRIT) Injectable 85159 Unit(s) SubCutaneous every 7 days  finasteride 5 milliGRAM(s) Oral daily  fosphenytoin IVPB 100 milliGRAM(s) PE IV Intermittent every 12 hours  hydrALAZINE 100 milliGRAM(s) Oral every 8 hours  insulin lispro (ADMELOG) corrective regimen sliding scale   SubCutaneous every 6 hours  levothyroxine Injectable 40 MICROGram(s) IV Push at bedtime  melatonin 6 milliGRAM(s) Oral at bedtime  mycophenolate mofetil Suspension 1000 milliGRAM(s) Enteral Tube <User Schedule>  pantoprazole   Suspension 40 milliGRAM(s) Oral daily  polyethylene glycol 3350 17 Gram(s) Oral daily  predniSONE  Solution 5 milliGRAM(s) Oral daily  valGANciclovir 50 mG/mL Oral Solution 450 milliGRAM(s) Oral daily    MEDICATIONS  (PRN):  acetaminophen    Suspension .. 650 milliGRAM(s) Enteral Tube every 6 hours PRN Mild Pain (1 - 3)      PAST MEDICAL & SURGICAL HISTORY:  Hypertension      Diabetes      Dyslipidemia      CAD (Coronary Artery Disease)  with Stents in 06/2009 and 6/2019, s/p off-pump C3L on 8/13/20      Hypothyroidism      CVA (cerebral vascular accident)  12/13/19 with residual bilateral weakness      Anemia      PEG (percutaneous endoscopic gastrostomy) status  removed July 2020      Intubation of airway performed without difficulty  Dec 2019  CVA c/b status epilepticus requiring intubation and PEG in 12/2019-      ESRD on dialysis  M/W/F      Seizures  after CVA, last seizure was last week 4/07/22      History of insertion of stent into coronary artery bypass graft  2009 and 2019      S/P CABG x 3  off pump C3L on 8/13/20          Vital Signs Last 24 Hrs  T(C): 36.6 (10 Jul 2022 11:00), Max: 36.6 (09 Jul 2022 17:00)  T(F): 97.9 (10 Jul 2022 11:00), Max: 97.9 (10 Jul 2022 11:00)  HR: 59 (10 Jul 2022 11:00) (55 - 66)  BP: 163/70 (10 Jul 2022 11:00) (112/74 - 175/75)  BP(mean): 100 (10 Jul 2022 11:00) (82 - 108)  RR: 14 (10 Jul 2022 11:00) (13 - 41)  SpO2: 100% (10 Jul 2022 11:00) (97% - 100%)    Parameters below as of 10 Jul 2022 03:00  Patient On (Oxygen Delivery Method): room air        I&O's Summary    09 Jul 2022 07:01  -  10 Jul 2022 07:00  --------------------------------------------------------  IN: 1420 mL / OUT: 930 mL / NET: 490 mL    10 Jul 2022 07:01  -  10 Jul 2022 12:32  --------------------------------------------------------  IN: 410 mL / OUT: 0 mL / NET: 410 mL                              9.2    3.90  )-----------( 308      ( 10 Jul 2022 05:57 )             29.5     07-10    141  |  106  |  22  ----------------------------<  173<H>  4.1   |  24  |  1.17    Ca    8.5      10 Jul 2022 05:53  Phos  2.9     07-10  Mg     2.0     07-10    TPro  5.8<L>  /  Alb  2.3<L>  /  TBili  0.2  /  DBili  0.1  /  AST  18  /  ALT  11  /  AlkPhos  141<H>  07-10        REVIEW OF SYSTEMS  --------------------------------------------------------------------------------  unable to assess full ROM, +delirium  no obvious pain or discomfort      PHYSICAL EXAM:  Constitutional: Well developed / well nourished  Eyes: Anicteric, PERRLA  ENMT: nc/at  Neck: supple  Respiratory: CTA   Cardiovascular: RRR  Gastrointestinal: Soft, non distended, NT, RLQ incision healed   Genitourinary: voiding via tucker  Extremities: SCD's in place and working bilaterally, overall edema improving  Vascular: Palpable dp pulses bilaterally  Neurological: AAOx2  Skin: no rashes, ulcerations or lesions  Musculoskeletal: Moving all extremities, global weakness  Psychiatric: calm, starting to follow commands Transplant Surgery Multidisciplinary Progress Note   --------------------------------------------------------------  R DCD DDRT    4/21/22    Present: Patient seen and examined with multidisciplinary team including Transplant Surgeon: Dr. Barber,  Nephrologist: Dr. Mary Lomeli, [facility], unit RN during am rounds.  Disciplines not in attendance will be notified of the plan.     HPI: 66M with PMH: DM type II, HTN, CAD s/p CABG in 2020, AFib on Eliquis, CVA in 2019 due to Afib, Seizure d/o following CVA, last episode was on 4/8/22, h/o GIB in 2/2022 EGD with duodenal ulcer.  ESRD due to DM was on HD since 2019.     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  Donor was 58 , KDPI 82%, DCD, single vessels and ureter, HLA mismatch 1, 2, 2. No DSA, cPRA 0%. 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 Re-admitted in May for anemia in the setting of large perinephric hematoma s/p evacuation and repair of arterial anastomosis on 5/2/22. Intra operative biopsy showed no rejection, Creatinine ranging ~2mg/dL.    In Rehab: He was recently dx with klebsiella UTI rx with ertapenem - then switched to Levaquin day #6.    He also had parainfluenza pneumonia. Was at rehab progressing well.      Hospital course:  - 6/10: transferred from rehab facility for seizure status epilepticus. Intubated for respiratory protection and transferred to MICU.  EEG showed no seizure activity. Neuro consulted.   - 6/11: Extubated. Found to have R pleural effusion. s/p Thoracentesis 1.3L. Eliquis changed to heparin drip.  Post procedure drop in H&H. 5u PRBC, 2 u FFP.    - 6/11 evening: Transferred to SICU for further care in setting of hypoxia, significant R pleural effusion, hgb 6 and hemodynamic instability  - 6/11 Intubated at 9pm and sedated on Precedex.  CT placed, 600ml blood drained.  1L total overnight, started pressors  - 6/11 Received Protamine for PTT >100 (was on Heparin drip started for AF), 2 u FFP and 5u PRBC total  - 6/13 s/p VATS 2.2L old blood removed; second chest tube placed  - 6/18-19: chest tubes removed  - 6/21: ?PRES on MRI, off Envarsus, switched to Belatacept 6/23  - 6/25: Tx to SICU for refractory seizures    Interval Events:  - Afebrile, VSS  - mentation much improved today, AOX2 this AM.  -Overnight, initially failed TOV, was straight cathed once; passed TOV early this AM (Condom cath).   -Tolerating TFs at goal. Passed bedside S&S yesterday.     Immunosuppression:   Induction:  Thymoglobulin                                        Maintenance:  - Belatacept: 6/23, 7/4 (initial loading).  Re-loaded 7/8 as there was a gap between doses 2/2 seizures. next dose 7/18  - MMF 1g BID  - continue Pred 5  - Ongoing monitoring for signs of rejection.    Potential Discharge date: pending clinical improvement  Education:  Medications  Plan of care:  See Below        MEDICATIONS  (STANDING):  amLODIPine   Tablet 10 milliGRAM(s) Oral <User Schedule>  atorvastatin 40 milliGRAM(s) Oral at bedtime  atovaquone  Suspension 1500 milliGRAM(s) Oral <User Schedule>  chlorhexidine 2% Cloths 1 Application(s) Topical <User Schedule>  doxazosin 4 milliGRAM(s) Oral daily  enoxaparin Injectable 60 milliGRAM(s) SubCutaneous every 12 hours  epoetin cortney-epbx (RETACRIT) Injectable 17520 Unit(s) SubCutaneous every 7 days  finasteride 5 milliGRAM(s) Oral daily  fosphenytoin IVPB 100 milliGRAM(s) PE IV Intermittent every 12 hours  hydrALAZINE 100 milliGRAM(s) Oral every 8 hours  insulin lispro (ADMELOG) corrective regimen sliding scale   SubCutaneous every 6 hours  levothyroxine Injectable 40 MICROGram(s) IV Push at bedtime  melatonin 6 milliGRAM(s) Oral at bedtime  mycophenolate mofetil Suspension 1000 milliGRAM(s) Enteral Tube <User Schedule>  pantoprazole   Suspension 40 milliGRAM(s) Oral daily  polyethylene glycol 3350 17 Gram(s) Oral daily  predniSONE  Solution 5 milliGRAM(s) Oral daily  valGANciclovir 50 mG/mL Oral Solution 450 milliGRAM(s) Oral daily    MEDICATIONS  (PRN):  acetaminophen    Suspension .. 650 milliGRAM(s) Enteral Tube every 6 hours PRN Mild Pain (1 - 3)      PAST MEDICAL & SURGICAL HISTORY:  Hypertension      Diabetes      Dyslipidemia      CAD (Coronary Artery Disease)  with Stents in 06/2009 and 6/2019, s/p off-pump C3L on 8/13/20      Hypothyroidism      CVA (cerebral vascular accident)  12/13/19 with residual bilateral weakness      Anemia      PEG (percutaneous endoscopic gastrostomy) status  removed July 2020      Intubation of airway performed without difficulty  Dec 2019  CVA c/b status epilepticus requiring intubation and PEG in 12/2019-      ESRD on dialysis  M/W/F      Seizures  after CVA, last seizure was last week 4/07/22      History of insertion of stent into coronary artery bypass graft  2009 and 2019      S/P CABG x 3  off pump C3L on 8/13/20          Vital Signs Last 24 Hrs  T(C): 36.6 (10 Jul 2022 11:00), Max: 36.6 (09 Jul 2022 17:00)  T(F): 97.9 (10 Jul 2022 11:00), Max: 97.9 (10 Jul 2022 11:00)  HR: 59 (10 Jul 2022 11:00) (55 - 66)  BP: 163/70 (10 Jul 2022 11:00) (112/74 - 175/75)  BP(mean): 100 (10 Jul 2022 11:00) (82 - 108)  RR: 14 (10 Jul 2022 11:00) (13 - 41)  SpO2: 100% (10 Jul 2022 11:00) (97% - 100%)    Parameters below as of 10 Jul 2022 03:00  Patient On (Oxygen Delivery Method): room air        I&O's Summary    09 Jul 2022 07:01  -  10 Jul 2022 07:00  --------------------------------------------------------  IN: 1420 mL / OUT: 930 mL / NET: 490 mL    10 Jul 2022 07:01  -  10 Jul 2022 12:32  --------------------------------------------------------  IN: 410 mL / OUT: 0 mL / NET: 410 mL                              9.2    3.90  )-----------( 308      ( 10 Jul 2022 05:57 )             29.5     07-10    141  |  106  |  22  ----------------------------<  173<H>  4.1   |  24  |  1.17    Ca    8.5      10 Jul 2022 05:53  Phos  2.9     07-10  Mg     2.0     07-10    TPro  5.8<L>  /  Alb  2.3<L>  /  TBili  0.2  /  DBili  0.1  /  AST  18  /  ALT  11  /  AlkPhos  141<H>  07-10        REVIEW OF SYSTEMS  --------------------------------------------------------------------------------  unable to assess full ROM, +delirium  no obvious pain or discomfort      PHYSICAL EXAM:  Constitutional: Well developed / well nourished  Eyes: Anicteric, PERRLA  ENMT: nc/at  Neck: supple  Respiratory: CTA   Cardiovascular: RRR  Gastrointestinal: Soft, non distended, NT, RLQ incision healed   Genitourinary: voiding via tucker  Extremities: SCD's in place and working bilaterally, overall edema improving  Vascular: Palpable dp pulses bilaterally  Neurological: AAOx2  Skin: no rashes, ulcerations or lesions  Musculoskeletal: Moving all extremities, global weakness  Psychiatric: calm, starting to follow commands

## 2022-07-10 NOTE — PROGRESS NOTE ADULT - ASSESSMENT
66 year old male with PMH of CAD s/p CABG in 2020, AF on Eliquis c/b CVA in 2019 c/b seizures, HTN, HLD, DM type II, hypothyroidism, GI bleed due to a duodenal ulcer (2/2022), and ESRD s/p DDRT (4/21/22) c/b DGF requiring HD (last session 4/29/22) admitted with  status epilepticus    1.DCD DDRT on 4/21/220- Allograft function is stable   2.IS meds- on Belatacept  (concern for PRES & seizures) Last dose was on 7/4 and then further doses were held. Loading dose of belatacept given again on 7/8  continue   mg BID and  Prednisone 5 mg po daily   3.Hyperkalemia- resolved on Lokelma, bactrim was d/c and switched  to mepron  4. Altered mentation, more improved now. -  Neurology is following. on vEEG and AED management .

## 2022-07-10 NOTE — PROGRESS NOTE ADULT - SUBJECTIVE AND OBJECTIVE BOX
24 HOUR EVENTS:  -Passed S/S  -Miranda DC'd, failed TOV, SC x1  -OOB to chair    SUBJECTIVE/ROS:  [ ] A ten-point review of systems was otherwise negative except as noted.  [ ] Due to altered mental status/intubation, subjective information were not able to be obtained from the patient. History was obtained, to the extent possible, from review of the chart and collateral sources of information.      NEURO  RASS: 0    GCS:   14  CAM ICU:  Exam: awake, alert, oriented x1  Meds: acetaminophen    Suspension .. 650 milliGRAM(s) Enteral Tube every 6 hours PRN Mild Pain (1 - 3)  fosphenytoin IVPB 100 milliGRAM(s) PE IV Intermittent every 12 hours  melatonin 6 milliGRAM(s) Oral at bedtime    [x] Adequacy of sedation and pain control has been assessed and adjusted      RESPIRATORY  RR: 22 (07-10-22 @ 03:00) (13 - 41)  SpO2: 99% (07-10-22 @ 03:00) (96% - 100%)  Wt(kg): --  Exam: unlabored, RA    [N/A] Extubation Readiness Assessed  Meds:       CARDIOVASCULAR  HR: 60 (07-10-22 @ 03:00) (56 - 66)  BP: 149/65 (07-10-22 @ 03:00) (112/74 - 175/75)  BP(mean): 93 (07-10-22 @ 03:00) (82 - 108)  ABP: --  ABP(mean): --  Wt(kg): --  CVP(cm H2O): --      Exam: regular rate and rhythm  Cardiac Rhythm: sinus  Perfusion     [x]Adequate   [ ]Inadequate  Mentation   [x]Normal       [ ]Reduced  Extremities  [x]Warm         [ ]Cool  Volume Status [ ]Hypervolemic [x]Euvolemic [ ]Hypovolemic  Meds: amLODIPine   Tablet 10 milliGRAM(s) Oral <User Schedule>  doxazosin 4 milliGRAM(s) Oral daily  hydrALAZINE 100 milliGRAM(s) Oral every 8 hours        GI/NUTRITION  Exam: soft, nontender, nondistended, incision well healed   Diet: NPO w TF   Meds: pantoprazole   Suspension 40 milliGRAM(s) Oral daily  polyethylene glycol 3350 17 Gram(s) Oral daily      GENITOURINARY  I&O's Detail    07-08 @ 07:01 - 07-09 @ 07:00  --------------------------------------------------------  IN:    Enteral Tube Flush: 250 mL    IV PiggyBack: 350 mL    IV PiggyBack: 100 mL    Nepro with Carb Steady: 960 mL  Total IN: 1660 mL    OUT:    Indwelling Catheter - Urethral (mL): 1255 mL  Total OUT: 1255 mL    Total NET: 405 mL      07-09 @ 07:01  -  07-10 @ 03:53  --------------------------------------------------------  IN:    Enteral Tube Flush: 340 mL    IV PiggyBack: 50 mL    Nepro with Carb Steady: 800 mL  Total IN: 1190 mL    OUT:    Incontinent per Condom Catheter (mL): 225 mL    Indwelling Catheter - Urethral (mL): 280 mL    Intermittent Catheterization - Urethral (mL): 175 mL  Total OUT: 680 mL    Total NET: 510 mL          07-09    142  |  106  |  22  ----------------------------<  227<H>  3.9   |  22  |  1.25    Ca    8.6      09 Jul 2022 19:17  Phos  3.3     07-09  Mg     2.2     07-09    TPro  5.8<L>  /  Alb  2.2<L>  /  TBili  0.2  /  DBili  0.1  /  AST  16  /  ALT  10  /  AlkPhos  144<H>  07-09    [ ] Miranda catheter, indication: N/A  Meds:       HEMATOLOGIC  Meds: enoxaparin Injectable 60 milliGRAM(s) SubCutaneous every 12 hours    [x] VTE Prophylaxis                        9.3    5.05  )-----------( 303      ( 09 Jul 2022 06:02 )             30.3     PT/INR - ( 09 Jul 2022 06:02 )   PT: 13.4 sec;   INR: 1.16 ratio         PTT - ( 09 Jul 2022 06:02 )  PTT:41.5 sec  Transfusion     [ ] PRBC   [ ] Platelets   [ ] FFP   [ ] Cryoprecipitate      INFECTIOUS DISEASES  WBC Count: 5.05 K/uL (07-09 @ 06:02)    RECENT CULTURES:    Meds: atovaquone  Suspension 1500 milliGRAM(s) Oral <User Schedule>  epoetin cortney-epbx (RETACRIT) Injectable 25077 Unit(s) SubCutaneous every 7 days  mycophenolate mofetil Suspension 1000 milliGRAM(s) Enteral Tube <User Schedule>  valGANciclovir 50 mG/mL Oral Solution 450 milliGRAM(s) Oral daily        ENDOCRINE  CAPILLARY BLOOD GLUCOSE      POCT Blood Glucose.: 189 mg/dL (09 Jul 2022 23:30)  POCT Blood Glucose.: 226 mg/dL (09 Jul 2022 18:05)  POCT Blood Glucose.: 195 mg/dL (09 Jul 2022 12:30)  POCT Blood Glucose.: 154 mg/dL (09 Jul 2022 05:24)    Meds: atorvastatin 40 milliGRAM(s) Oral at bedtime  finasteride 5 milliGRAM(s) Oral daily  insulin lispro (ADMELOG) corrective regimen sliding scale   SubCutaneous every 6 hours  levothyroxine Injectable 40 MICROGram(s) IV Push at bedtime  predniSONE  Solution 5 milliGRAM(s) Oral daily        ACCESS DEVICES:  [ ] Peripheral IV  [ ] Central Venous Line	[ ] R	[ ] L	[ ] IJ	[ ] Fem	[ ] SC	Placed:   [ ] Arterial Line		[ ] R	[ ] L	[ ] Fem	[ ] Rad	[ ] Ax	Placed:   [ ] PICC:					[ ] Mediport  [ ] Urinary Catheter, Date Placed:   [x] Necessity of urinary, arterial, and venous catheters discussed    OTHER MEDICATIONS:  chlorhexidine 2% Cloths 1 Application(s) Topical <User Schedule>      CODE STATUS: FULL CODE

## 2022-07-10 NOTE — CHART NOTE - NSCHARTNOTEFT_GEN_A_CORE
Age: 66y    Gender: Male    POCT Blood Glucose:  240 mg/dL (07-10-22 @ 11:30)  165 mg/dL (07-10-22 @ 05:02)  189 mg/dL (07-09-22 @ 23:30)  226 mg/dL (07-09-22 @ 18:05)      eMAR:atorvastatin   40 milliGRAM(s) Oral (07-09-22 @ 21:38)    finasteride   5 milliGRAM(s) Oral (07-10-22 @ 11:52)    insulin lispro (ADMELOG) corrective regimen sliding scale   2 Unit(s) SubCutaneous (07-10-22 @ 11:32)   1 Unit(s) SubCutaneous (07-10-22 @ 05:06)   1 Unit(s) SubCutaneous (07-09-22 @ 23:32)   2 Unit(s) SubCutaneous (07-09-22 @ 18:06)   1 Unit(s) SubCutaneous (07-09-22 @ 12:31)    levothyroxine Injectable   40 MICROGram(s) IV Push (07-09-22 @ 21:37)    predniSONE  Solution   5 milliGRAM(s) Oral (07-10-22 @ 05:07)      noted BG tends to run higher in noon time would consider adding NPH 1 unit at 6am only if remains on TF.    per d/w primary team sicu would like to hold off on starting standing basal for today and advancing diet over next 1-2 days slowly, per note passed speech and swallow, and received some clears inbetween today, please ensure any PO intake is CHO.   Endocrine to  continue to monitor closely . continue LDSS q6h for now  When tolerating diet and BG >180 will start standing insulin, will need to monitor po intake     d.w Melly HAAS

## 2022-07-10 NOTE — PROGRESS NOTE ADULT - SUBJECTIVE AND OBJECTIVE BOX
[facility] DIVISION OF KIDNEY DISEASES AND HYPERTENSION -- FOLLOW UP NOTE  --------------------------------------------------------------------------------  Chief Complaint:    24 hour events/subjective:  Patient was seen and examined at bedside      PAST HISTORY  --------------------------------------------------------------------------------  No significant changes to PMH, PSH, FHx, SHx, unless otherwise noted    ALLERGIES & MEDICATIONS  --------------------------------------------------------------------------------  Allergies    No Known Allergies    Intolerances      Standing Inpatient Medications  amLODIPine   Tablet 10 milliGRAM(s) Oral <User Schedule>  atorvastatin 40 milliGRAM(s) Oral at bedtime  atovaquone  Suspension 1500 milliGRAM(s) Oral <User Schedule>  chlorhexidine 2% Cloths 1 Application(s) Topical <User Schedule>  doxazosin 4 milliGRAM(s) Oral daily  enoxaparin Injectable 60 milliGRAM(s) SubCutaneous every 12 hours  epoetin cortney-epbx (RETACRIT) Injectable 59349 Unit(s) SubCutaneous every 7 days  finasteride 5 milliGRAM(s) Oral daily  fosphenytoin IVPB 100 milliGRAM(s) PE IV Intermittent every 12 hours  hydrALAZINE 100 milliGRAM(s) Oral every 8 hours  insulin lispro (ADMELOG) corrective regimen sliding scale   SubCutaneous every 6 hours  levothyroxine Injectable 40 MICROGram(s) IV Push at bedtime  melatonin 6 milliGRAM(s) Oral at bedtime  mycophenolate mofetil Suspension 1000 milliGRAM(s) Enteral Tube <User Schedule>  pantoprazole   Suspension 40 milliGRAM(s) Oral daily  polyethylene glycol 3350 17 Gram(s) Oral daily  predniSONE  Solution 5 milliGRAM(s) Oral daily  valGANciclovir 50 mG/mL Oral Solution 450 milliGRAM(s) Oral daily    PRN Inpatient Medications  acetaminophen    Suspension .. 650 milliGRAM(s) Enteral Tube every 6 hours PRN      REVIEW OF SYSTEMS  --------------------------------------------------------------------------------  Gen: No fatigue, fevers/chills, weakness  Head/Eyes/Ears/Mouth: No headache;No sore throat  Respiratory: No dyspnea, cough,   CV: No chest pain, PND, orthopnea  GI: No abdominal pain, diarrhea, constipation, nausea, vomiting  Transplant: No pain  : No increased frequency, dysuria, hematuria, nocturia  Neuro: No dizziness/lightheadedness, weakness, seizures, numbness, tingling    All other systems were reviewed and are negative, except as noted.    VITALS/PHYSICAL EXAM  --------------------------------------------------------------------------------  T(C): 36.4 (07-10-22 @ 07:00), Max: 36.6 (07-09-22 @ 17:00)  HR: 58 (07-10-22 @ 09:00) (55 - 66)  BP: 149/68 (07-10-22 @ 09:00) (112/74 - 175/75)  RR: 16 (07-10-22 @ 09:00) (13 - 41)  SpO2: 100% (07-10-22 @ 09:00) (97% - 100%)  Wt(kg): --        07-09-22 @ 07:01  -  07-10-22 @ 07:00  --------------------------------------------------------  IN: 1420 mL / OUT: 930 mL / NET: 490 mL    07-10-22 @ 07:01  -  07-10-22 @ 10:02  --------------------------------------------------------  IN: 307.5 mL / OUT: 0 mL / NET: 307.5 mL      Physical Exam:               Gen: NAD, awake , sitting in chair, AAOX 2  	HEENT: Anicteric, no JVD  	Pulm: CTA B/L  	CV: RRR, S1S2  	Abd: +BS, soft, nontender/nondistended              Extremities: no bilateral LE edema, UE edema b/l              Neuro: AAOX 2  	Skin: Warm, without rashes              Miranda with clear urine              Transplant: No tenderness, swelling      LABS/STUDIES  --------------------------------------------------------------------------------              9.2    3.90  >-----------<  308      [07-10-22 @ 05:57]              29.5     141  |  106  |  22  ----------------------------<  173      [07-10-22 @ 05:53]  4.1   |  24  |  1.17        Ca     8.5     [07-10-22 @ 05:53]      Mg     2.0     [07-10-22 @ 05:53]      Phos  2.9     [07-10-22 @ 05:53]    TPro  5.8  /  Alb  2.3  /  TBili  0.2  /  DBili  0.1  /  AST  18  /  ALT  11  /  AlkPhos  141  [07-10-22 @ 05:53]    PT/INR: PT 13.4 , INR 1.16       [07-09-22 @ 06:02]  PTT: 41.5       [07-09-22 @ 06:02]      Creatinine Trend:  SCr 1.17 [07-10 @ 05:53]  SCr 1.25 [07-09 @ 19:17]  SCr 1.25 [07-09 @ 06:02]  SCr 1.34 [07-08 @ 18:06]  SCr 1.45 [07-08 @ 06:08]              Urinalysis - [07-03-22 @ 14:32]      Color Light Yellow / Appearance Clear / SG 1.018 / pH 8.0      Gluc Negative / Ketone Negative  / Bili Negative / Urobili Negative       Blood Negative / Protein Trace / Leuk Est Negative / Nitrite Negative      RBC 1 / WBC 2 / Hyaline 0 / Gran  / Sq Epi  / Non Sq Epi 1 / Bacteria Negative      Iron 17, TIBC 171, %sat 10      [05-02-22 @ 13:03]  Ferritin 1505      [05-02-22 @ 13:05]  PTH -- (Ca 9.2)      [02-05-22 @ 10:13]   294  HbA1c 6.0      [02-21-20 @ 08:53]  TSH 4.69      [07-06-22 @ 18:36]      Syphilis Screen (Treponema Pallidum Ab) Negative      [06-27-22 @ 02:00]

## 2022-07-11 NOTE — ED PROVIDER NOTE - INTERNATIONAL TRAVEL
Gynecologic Annual Exam Note        GYN Annual Exam     CC - Here for annual exam.        MILVIA  Marzena Henderson is a 56 y.o. female, , who presents for annual well woman exam as a established patient.  She is postmenopausal. Denies vaginal bleeding.  Patient reports problems with: none. There were no changes to her medical or surgical history since her last visit.. Partner Status: Marital Status: .  She is is sexually active. She has not had new partners since her last STD testing. She does not desire STD testing. STD testing recommendations have been explained to the patient..     Additional OB/GYN History   On HRT? Yes. Details: using Testosterone/Estrogen and Prometrium 400mg. She gets this through 25 again.    Last Pap : 21. Results: negative. HPV: negative. Pt. Desires to continue yearly paps. She understands the current guidelines and that insurance may not cover.  Last Completed Pap Smear          Ordered - PAP SMEAR (Every 3 Years) Ordered on 2021  SCANNED - PAP SMEAR    2020  Done - negative    2017  Done              History of abnormal Pap smear: no  Family history of uterine, colon, breast, or ovarian cancer: yes - breast cancer in 2 maternal aunts, one of which also had ovarian cancer  Performs monthly Self-Breast Exam: yes  Last mammogram: 22. Done at .    Last Completed Mammogram          Ordered - MAMMOGRAM (Every 2 Years) Ordered on 7/10/2022    2022  Mammo Screening Digital Tomosynthesis Bilateral With CAD    2021  Mammo Screening Digital Tomosynthesis Bilateral With CAD    2020  Mammo Screening Digital Tomosynthesis Bilateral With CAD    2019  Mammo Screening Digital Tomosynthesis Bilateral With CAD    01/15/2018  Mammo Screening Digital Tomosynthesis Bilateral With CAD    Only the first 5 history entries have been loaded, but more history exists.              Last colonoscopy: has had a colonoscopy 4 year(s)  ago.    Last Completed Colonoscopy          COLORECTAL CANCER SCREENING (COLONOSCOPY - Every 10 Years) Next due on 2018  SCANNED - COLONOSCOPY                  Last bone density scan (DEXA): On 19 and results were Normal  Exercises Regularly: yes  Feelings of Anxiety or Depression: no      Tobacco Usage?: No       Current Outpatient Medications:   •  levothyroxine (SYNTHROID, LEVOTHROID) 50 MCG tablet, , Disp: , Rfl:   •  liothyronine (CYTOMEL) 5 MCG tablet, , Disp: , Rfl:   •  Progesterone (PROMETRIUM) 200 MG capsule, 400 mg., Disp: , Rfl:   •  Unable to find, Apply one click in the morning AND one click at night, Disp: , Rfl:   •  Unable to find, Take 1-2 capsules by mouth at bedtime, Disp: , Rfl:   •  vitamin E 200 UNIT capsule, one daily, Disp: , Rfl:     Patient denies the need for medication refills today.    OB History        3    Para   3    Term   3            AB        Living   3       SAB        IAB        Ectopic        Molar        Multiple        Live Births                    Past Medical History:   Diagnosis Date   • Hypothyroidism    • Osteoarthritis    • Vitamin D deficiency disease         Past Surgical History:   Procedure Laterality Date   • CHOLECYSTECTOMY     • SHOULDER SURGERY         Health Maintenance   Topic Date Due   • ZOSTER VACCINE (1 of 2) Never done   • ANNUAL PHYSICAL  2020   • COVID-19 Vaccine (3 - Booster for Pfizer series) 2021   • Annual Gynecologic Pelvic and Breast Exam  2022   • INFLUENZA VACCINE  10/01/2022   • MAMMOGRAM  2024   • PAP SMEAR  2024   • TDAP/TD VACCINES (2 - Td or Tdap) 2027   • COLORECTAL CANCER SCREENING  2028   • HEPATITIS C SCREENING  Completed   • Pneumococcal Vaccine 0-64  Aged Out       The additional following portions of the patient's history were reviewed and updated as appropriate: allergies, current medications, past family history, past medical history, past social  "history, past surgical history and problem list.    Review of Systems   Constitutional: Negative.    HENT: Negative.    Eyes: Negative.    Respiratory: Negative.    Cardiovascular: Negative.    Gastrointestinal: Negative.    Endocrine: Negative.    Genitourinary: Negative.    Musculoskeletal: Negative.    Skin: Negative.    Allergic/Immunologic: Negative.    Neurological: Negative.    Hematological: Negative.    Psychiatric/Behavioral: Negative.        I have reviewed and agree with the HPI, ROS, and historical information as entered above. Ewa Snow MD    Objective   /70   Ht 162.6 cm (64\")   Wt 77.6 kg (171 lb)   LMP  (LMP Unknown)   BMI 29.35 kg/m²     Physical Exam  Vitals and nursing note reviewed. Exam conducted with a chaperone present.   Constitutional:       Appearance: She is well-developed.   HENT:      Head: Normocephalic and atraumatic.   Neck:      Thyroid: No thyroid mass or thyromegaly.   Cardiovascular:      Rate and Rhythm: Normal rate and regular rhythm.      Heart sounds: No murmur heard.  Pulmonary:      Effort: Pulmonary effort is normal. No retractions.      Breath sounds: Normal breath sounds. No wheezing, rhonchi or rales.   Chest:      Chest wall: No mass or tenderness.   Breasts:      Right: Normal. No mass, nipple discharge, skin change or tenderness.      Left: Normal. No mass, nipple discharge, skin change or tenderness.       Abdominal:      General: Bowel sounds are normal.      Palpations: Abdomen is soft. Abdomen is not rigid. There is no mass.      Tenderness: There is no abdominal tenderness. There is no guarding.      Hernia: No hernia is present. There is no hernia in the left inguinal area or right inguinal area.   Genitourinary:     General: Normal vulva.      Exam position: Lithotomy position.      Pubic Area: No rash.       Labia:         Right: No rash, tenderness or lesion.         Left: No rash, tenderness or lesion.       Urethra: No urethral pain " or urethral swelling.      Vagina: Normal. No vaginal discharge or lesions.      Cervix: No cervical motion tenderness, discharge, lesion or cervical bleeding.      Uterus: Normal. Not enlarged, not fixed and not tender.       Adnexa:         Right: No mass, tenderness or fullness.          Left: No mass, tenderness or fullness.        Rectum: No external hemorrhoid.   Musculoskeletal:      Cervical back: Normal range of motion. No muscular tenderness.   Neurological:      Mental Status: She is alert and oriented to person, place, and time.   Psychiatric:         Behavior: Behavior normal.            Assessment and Plan    Problem List Items Addressed This Visit        Genitourinary and Reproductive     Menopause - Primary    Overview     Last DEXA on 7/5/2019 was within normal limits.  Recommend calcium, vitamin D, and weightbearing exercises.  We will repeat in 5 years.             Other Visit Diagnoses     Women's annual routine gynecological examination        Relevant Orders    LIQUID-BASED PAP SMEAR, [facility] ([facility])    Breast cancer screening by mammogram        Relevant Orders    Mammo Screening Digital Tomosynthesis Bilateral With CAD          1. GYN annual well woman exam.   2. Reviewed monthly self breast exams.  Instructed to call with lumps, pain, or breast discharge.  Yearly mammograms ordered.  3. Ordered mammogram today.  4. Recommended use of Vitamin D and getting adequate calcium in her diet. (1500mg)  5. Reviewed exercise as a preventative health measures.   6. Reviewed BMI and weight loss as preventative health measures.   7. Reccommended Flu Vaccine in Fall of each year.  8. RTC in 1 year or PRN with problems.  9. Return in about 1 year (around 7/11/2023) for Annual physical.     Ewa Snow MD  07/11/2022     Unable to Assess

## 2022-07-11 NOTE — PROGRESS NOTE ADULT - NSPROGADDITIONALINFOA_GEN_ALL_CORE
-Plan discussed with pt/team.  Contact info: [phone] (24/7). pager [phone]  [email or web address] password [email or web address]  Spent  27 minutes assessing pt/labs/meds and discussing plan of care with primary team  Adjusting insulin  Discharge plan  Follow up care

## 2022-07-11 NOTE — PROGRESS NOTE ADULT - ASSESSMENT
67yo man with PMH of CVA (2019) with subsequent seizure disorder, ESRD s/p renal transplant (04/2022), afib (on apixaban), CAD (s/p stents), CABG (2020), HTN, HLD, DM presents to the ED with status epilepticus from [facility]. Semiology includes eyes deviated to the R, rhythmic R facial twitching as well as R shoulder clonic movements lasting approximately 30 seconds each. EEG from 04/2022 showed L frontocentral focal onset seizures. S/p intubation on 6/11. EEG negative for seizure but showed structural or functional abnormality in the left fronto-temporal region and moderate to severe nonspecific diffuse or multifocal cerebral dysfunction. RRT called 6/21 for decreased movement of RUE but with increased tone and tremor like activity/ clonus and diffuse increased tone and increased encephalopathy concerning for seizure. Course then complicated by EEG 6/24 showing L frontotemporal/ frontal seizures. Was on keppra, valproic acid, vimpat but had seizures through the prior two medications. Now on fycompa, fosphenytoin. Stopped vimpat, valproic acid and standing ativan.    Corrected phenytoin level: measured level 16.1, corrected 22.0 (7/11)  EEG on 6/24 showing left frontal and frontotemporal onset seizures    Impression: History of recent status epilepticus that was refractory and required current therapy of fycompa and fosphenytoin. Current change in mental status possibly from hospital-induced delirium with toxic-metabolic encephalopathy.     Recommendations:  [] Reduce fosphenytoin to 50mg in AM and 100mg in PM  [] Recheck phenytoin level in 2-3 days with CMP, trough dose (30 mins prior to AM dose)  [] monitor clinically for seizures  [] For stroke standpoint, should restart eliquis for secondary stroke prevention given afib hx if not otherwise contraindicated  [x] CSF without evidence of infection, inflammation  [x] Vitamin B12 1157, folate >20, homocysteine 13.4  [] f/u Vitamin B1, methylmalonic acid  [x] TSH 4.6, T3 3.9/T4 43 - correction of hypothyroidism per primary team and endocrinology  [x] BP goals of normotension   [x] MR brain without clear evidence of infection, inflammation, or acute CNS event (possibly minimal enhancement). Although prior study read as possible PRES, most recent MRI seems more consistent with significant chronic ischemic microvascular disease given no associated DWI changes or obvious enhancement  [x] Telemetry monitoring  [x] Neuro checks and vital signs per unit protocol  [x] Seizure/fall/aspiration precautions  [] please have adequate sleeping environment for the patient, light on, curtains open, TV on during the day with regular stimulation and out of bed to chair if possible. During the night, close curtains, TV off, lights off, and minimize interruptions (limit blood draws and vital sign checks if possible). Regular interaction with patient with staff (introducing selves), frequent reorientation, and encouragement of visitors as allowed by hospital and unit policy. Regular toileting.  [] if needed for sleep may administer melatonin 3mg  [] limitation of sedating medications as tolerated  [] maintain electrolytes within normal limits  [/] Advise against driving, operating heavy machinery, water sports/bathing, activity in elevation without appropriate safety precautions  [ ] outpatient EMG/ NCS       Patient seen and discussed with neurology attending, Dr. Maria. Discussed with epilepsy attending, Dr. Allred.  67yo man with PMH of CVA (2019) with subsequent seizure disorder, ESRD s/p renal transplant (04/2022), afib (on apixaban), CAD (s/p stents), CABG (2020), HTN, HLD, DM presents to the ED with status epilepticus from [facility]. Semiology includes eyes deviated to the R, rhythmic R facial twitching as well as R shoulder clonic movements lasting approximately 30 seconds each. EEG from 04/2022 showed L frontocentral focal onset seizures. S/p intubation on 6/11. EEG negative for seizure but showed structural or functional abnormality in the left fronto-temporal region and moderate to severe nonspecific diffuse or multifocal cerebral dysfunction. RRT called 6/21 for decreased movement of RUE but with increased tone and tremor like activity/ clonus and diffuse increased tone and increased encephalopathy concerning for seizure. Course then complicated by EEG 6/24 showing L frontotemporal/ frontal seizures. Was on keppra, valproic acid, vimpat but had seizures through the prior two medications. Stopped vimpat, valproic acid, standing ativan, and fycompa. Now only on fosphenytoin.    Last EEG 7/7:  Infrequent left posterior sharp discharges, Left frontotemporal slowing  Corrected phenytoin level: measured level 16.1, corrected 22.0 (7/11)    Impression: History of recent status epilepticus that was refractory, now on fosphenytoin. Waxing/waning mental status possibly from hospital-induced delirium with toxic-metabolic encephalopathy.     Recommendations:  [] Reduce fosphenytoin to 50mg in AM and 100mg in PM  [] Recheck phenytoin level in 2-3 days with CMP, trough dose (30 mins prior to AM dose)  [] monitor clinically for seizures  [] For stroke standpoint, should restart eliquis for secondary stroke prevention given afib hx if not otherwise contraindicated  [x] CSF without evidence of infection, inflammation  [x] Vitamin B12 1157, folate >20, homocysteine 13.4  [] f/u Vitamin B1, methylmalonic acid  [x] TSH 4.6, T3 3.9/T4 43 - correction of hypothyroidism per primary team and endocrinology  [x] BP goals of normotension   [x] MR brain without clear evidence of infection, inflammation, or acute CNS event (possibly minimal enhancement). Although prior study read as possible PRES, most recent MRI seems more consistent with significant chronic ischemic microvascular disease given no associated DWI changes or obvious enhancement  [x] Telemetry monitoring  [x] Neuro checks and vital signs per unit protocol  [x] Seizure/fall/aspiration precautions  [] please have adequate sleeping environment for the patient, light on, curtains open, TV on during the day with regular stimulation and out of bed to chair if possible. During the night, close curtains, TV off, lights off, and minimize interruptions (limit blood draws and vital sign checks if possible). Regular interaction with patient with staff (introducing selves), frequent reorientation, and encouragement of visitors as allowed by hospital and unit policy. Regular toileting.  [] if needed for sleep may administer melatonin 3mg  [] limitation of sedating medications as tolerated  [] maintain electrolytes within normal limits  [/] Advise against driving, operating heavy machinery, water sports/bathing, activity in elevation without appropriate safety precautions  [ ] outpatient EMG/ NCS       Patient seen and discussed with neurology attending, Dr. Maria. Discussed with epilepsy attending, Dr. Allred.

## 2022-07-11 NOTE — PROGRESS NOTE ADULT - ASSESSMENT
66M with h/o DM type II, HTN, CAD s/p CABG in 2020, Afib on Eliquis, CVA in 2019 due to Afib, Seizure d/o (last episode was on 4/8/22), h/o GI bleed in 2/2022 EGD with duodenal ulcer and ESRD on HD s/p R DDRT from [facility]CD donor on 4/21/22 complicated by DGF requiring HD until 4/29/22 now with good graft function who presented with status epilepticus in setting of UTI/antibiotics and sub-therapeutic valproic acid level     Seizure Disorder  - repeat MRI head 6/27 with less concerns for PRES, likely ischemic changes  - remains seizure free  - Antiepileptic regimen per Neurology, please follow recs daily. Will discuss PO options once tolerates PO  - start Seroquel QHS as QTC permits.    - Keep Mag > 2  - infectious w/u negative so far, has been off ABX.      R DCD DDRT (ureter stented) 4/21/22  - excellent renal function  - will plan for removal of ureteral stent tomorrow (Monday).  pre-op mode Tonight.  Hold Lovenox startinging tonight and restart after procedure.   - Strict I/Os, Continue Doxazosin/Proscar. Monitor UOP from condom cath  - Continue TFs via NGT, passed bedside S&S, try sips as able  - No PEG at this time per family  - OOB with RN/PT    Hyperkalemia:   - resolved. Continue to monitor    Immunosuppression:   - Belatacept: 6/23, 7/4 (initial loading).  Re-loaded 7/8 as there was a gap between doses 2/2 seizures. next dose 7/18  - MMF 1g BID  - continue Pred 5  - PPx:  Mepron/Valcyte/PPI    DM  - on Lantus/Lispro, Endocrine following     AFib:  - continue Retacrit weekly  - Eliquis with ~40% less efficacy while on fosphenytoin.  Switched back to Lovenox 60BID. HOLD starting tonight for stent removal tomorow  - rate controlled    R IJ DVT  - as above  - check LUE doppler    HTN:  - continue Norvasc/Hydralazine/Cardura     66M with h/o DM type II, HTN, CAD s/p CABG in 2020, Afib on Eliquis, CVA in 2019 due to Afib, Seizure d/o (last episode was on 4/8/22), h/o GI bleed in 2/2022 EGD with duodenal ulcer and ESRD on HD s/p R DDRT from [facility] donor on 4/21/22 complicated by DGF requiring HD until 4/29/22 now with good graft function who presented with status epilepticus in setting of UTI/antibiotics and sub-therapeutic valproic acid level     Seizure Disorder:  - Repeat MRI head 6/27 with less concerns for PRES, likely ischemic changes  - Remains seizure free  - Antiepileptic regimen per Neurology, currently on fosphenytoin 100mg Q1H. Will f/u Neuro recs and discuss PO options once tolerates PO  - Keep Mag > 2  - Infectious w/u negative so far, has been off ABX.      R DCD DDRT (ureter stented) 4/21/22:  - Lasix 40 IVP x 1  - Excellent renal function  - Will plan for removal of ureteral stent tomorrow (Tuesday).  Pre-op mode Tonight.  Hold Lovenox starting tonight and restart after procedure.   - Strict I/Os, Continue Doxazosin/Proscar. Monitor UOP  - Continue TFs via NGT, passed bedside S&S in SICU, formal S&S ordered  - No PEG at this time per family  - OOB with RN/PT    Hyperkalemia:   - resolved. Continue to monitor    Immunosuppression:   - Belatacept: 6/23, 7/4 (initial loading).  Re-loaded 7/8 as there was a gap between doses 2/2 seizures. next dose 7/18  - MMF 1g BID  - continue Pred 5  - PPx:  Mepron/Valcyte/PPI    DM  - on Lantus/Lispro, Endocrine following     AFib:  - continue Retacrit weekly  - Eliquis with ~40% less efficacy while on fosphenytoin.  Switched back to Lovenox 60BID. HOLD tonight for stent removal tomorrow  - rate controlled    R IJ DVT  - Continue tLovenox due to drug interactions between fosphenytoin and coumadin/NOACs    HTN:  - continue Norvasc/Hydralazine/Cardura

## 2022-07-11 NOTE — PROGRESS NOTE ADULT - ASSESSMENT
66 yr old Male with PMHx ESRD s/p permacath removal 5/10/22 s/p Rt DDRT 4/21/22,  CVA (2019), Afib on apixaban, seizure activity, CAD s/p stents, CABG (2020), HTN, HLD, DM on insulin, gastric/duodenal ulcer, recent hospitalization 4/28/22 -5/10/22 with weakness/anemia found to have perinephric hematoma requiring evacuation and repair of bleeding arterial anastomosis. Curently being treated for UTI with Levaquin Today after developing multiple sz episodes refractory to 5 mg versed IM (EMS) and 2 mg ativan IV in E.D. concerning for status epilepticus requiring intubation for hypoxic respiratory  failure. MICU Consult was called for hypoxic respiratory failure secondary to status epilepticus.  Nephrology consulted for renal failure.     A/P  DDRT on 04/21/22  Course complicated by DGF, was on HD until 4/29/22. Readmitted in May for anemia in the setting of large perinephric hematoma s/p evacuation and repair of arterial anastomosis on 5/2/22. Intra operative biopsy showed no rejection.  ANA likely sec to  hemodynamic change   scr remains stable  optimize glucose   monitor BMP, U/O     Hyperkalemia   resolved   monitor K closely     HTN   elevated   overloaded - s/p lasix 40mg IV   monitor BP closely     Immunosuppression  continue per transplant team

## 2022-07-11 NOTE — PROGRESS NOTE ADULT - SUBJECTIVE AND OBJECTIVE BOX
SUBJECTIVE/INTERVAL HISTORY: Pt seen and examined at bedside with attending. Pt awake and alert, smiling, reports no issues.     PAST MEDICAL & SURGICAL HISTORY:  Hypertension      Diabetes      Dyslipidemia      CAD (Coronary Artery Disease)  with Stents in 2009 and 2019, s/p off-pump C3L on 20      Hypothyroidism      CVA (cerebral vascular accident)  19 with residual bilateral weakness      Anemia      PEG (percutaneous endoscopic gastrostomy) status  removed 2020      Intubation of airway performed without difficulty  Dec 2019  CVA c/b status epilepticus requiring intubation and PEG in 2019-      ESRD on dialysis  M/W/F      Seizures  after CVA, last seizure was last week 22      History of insertion of stent into coronary artery bypass graft   and 2019      S/P CABG x 3  off pump C3L on 20        FAMILY HISTORY:  Family history of cancer of tongue (Father)  Parents are  . Father - at 80 years, tongue cancer was a smoker. Mother- at 71, had accident while crossing road. Siblings- 2 brothers and one sister.        MEDICATIONS (HOME):  Home Medications:  amLODIPine 10 mg oral tablet: 1 tab(s) enteral once a day (2022 15:45)  apixaban 2.5 mg oral tablet: 1 tab(s) orally 2 times a day (2022 15:45)  atorvastatin 40 mg oral tablet: 1 tab(s) orally once a day (at bedtime) (2022 15:45)  CellCept 500 mg oral tablet: 1 tab(s) orally 2 times a day (2022 15:45)  Coreg 6.25 mg oral tablet: 1 tab(s) orally 2 times a day (2022 15:45)  divalproex sodium 250 mg oral delayed release tablet: 1 tab(s) orally once a day (2022 15:45)  divalproex sodium 500 mg oral delayed release tablet: 1 tab(s) orally 2 times a day (2022 15:45)  Envarsus XR 1 mg oral tablet, extended release: 3 tab(s) by gastrostomy tube once a day (in the morning) (2022 15:45)  fluconazole 200 mg oral tablet: 1 tab(s) orally once a day (2022 15:45)  HumaLOG 100 units/mL injectable solution: 5 unit(s) injectable 3 times a day (2022 15:45)  insulin glargine 100 units/mL subcutaneous solution: 6 unit(s) subcutaneous once a day (at bedtime) (2022 14:27)  levETIRAcetam 250 mg oral tablet: 1 tab(s) orally 2 times a day (2022 15:45)  levothyroxine 50 mcg (0.05 mg) oral tablet: 1 tab(s) orally once a day (2022 15:45)  magnesium oxide 400 mg oral tablet: 1 tab(s) orally 3 times a day (with meals) (2022 15:45)  Multiple Vitamins oral capsule: 1 cap(s) orally once a day (2022 15:45)  pantoprazole 40 mg oral delayed release tablet: 1 tab(s) orally once a day (before a meal) (2022 15:45)  predniSONE: 5 milligram(s) orally once a day (2022 15:45)  senna oral tablet: 2 tab(s) orally once a day (at bedtime) (2022 15:45)  sulfamethoxazole-trimethoprim 400 mg-80 mg oral tablet: 1 tab(s) orally once a day (2022 15:45)  valGANciclovir 450 mg oral tablet: 1 tab(s) orally 3 times a week (2022 15:45)    MEDICATIONS  (STANDING):  amLODIPine   Tablet 10 milliGRAM(s) Oral <User Schedule>  atorvastatin 40 milliGRAM(s) Oral at bedtime  atovaquone  Suspension 1500 milliGRAM(s) Oral <User Schedule>  chlorhexidine 2% Cloths 1 Application(s) Topical <User Schedule>  doxazosin 4 milliGRAM(s) Oral daily  epoetin crotney-epbx (RETACRIT) Injectable 83273 Unit(s) SubCutaneous every 7 days  finasteride 5 milliGRAM(s) Oral daily  fosphenytoin IVPB 100 milliGRAM(s) PE IV Intermittent every 12 hours  hydrALAZINE 100 milliGRAM(s) Oral every 8 hours  insulin lispro (ADMELOG) corrective regimen sliding scale   SubCutaneous every 6 hours  levothyroxine Injectable 40 MICROGram(s) IV Push at bedtime  melatonin 6 milliGRAM(s) Oral at bedtime  mycophenolate mofetil Suspension 1000 milliGRAM(s) Enteral Tube <User Schedule>  pantoprazole   Suspension 40 milliGRAM(s) Oral daily  polyethylene glycol 3350 17 Gram(s) Oral daily  predniSONE  Solution 5 milliGRAM(s) Oral daily  valGANciclovir 50 mG/mL Oral Solution 450 milliGRAM(s) Oral daily    MEDICATIONS  (PRN):  acetaminophen    Suspension .. 650 milliGRAM(s) Enteral Tube every 6 hours PRN Mild Pain (1 - 3)    ALLERGIES/INTOLERANCES:  Allergies  No Known Allergies    Intolerances    VITALS & EXAMINATION:  Vital Signs Last 24 Hrs  T(C): 36.7 (2022 12:00), Max: 36.9 (10 Jul 2022 20:26)  T(F): 98 (2022 12:00), Max: 98.4 (10 Jul 2022 20:26)  HR: 65 (2022 13:00) (60 - 67)  BP: 169/77 (2022 13:00) (139/71 - 178/77)  BP(mean): 114 (2022 01:10) (83 - 114)  RR: 19 (2022 12:00) (18 - 20)  SpO2: 100% (2022 13:00) (96% - 100%)    Parameters below as of 2022 12:00  Patient On (Oxygen Delivery Method): room air      General appearance: does not appear to be in pain  Head: normocephalic  Eyes: clear sclera  Extremities: no ezio LE edema  Respiratory: breathing regularly    Focused neurologic exam:  MS - awake, alert, oriented to person, place, not date, but perseverates and only follows simple commands rarely. Attend to stimuli from both sides.   CN - pupils equal round, EOMI, BTT b/l, no apparent facial asymmetry, hearing intact to conversation b/l   Motor - Normal bulk. Inc tone in L side and normal tone in R side. Spontaneous movements of L > R side. Grimaces, withdraws, and localizes to strong noxious in LUE 4+/5, LLE 4-/5, RUE 3+/5, RLE 2/5  Sens - LT/temp intact all, as above  Coord - PERRY  Gait and station - PERRY    LABORATORY:  CBC                       9.8    3.79  )-----------( 281      ( 2022 06:51 )             31.9     Chem 07-11    139  |  106  |  22  ----------------------------<  207<H>  4.2   |  24  |  1.15    Ca    8.4      2022 06:51  Phos  2.8     07-11  Mg     1.9     07-11    TPro  5.8<L>  /  Alb  2.3<L>  /  TBili  0.2  /  DBili  <0.1  /  AST  19  /  ALT  12  /  AlkPhos  144<H>  07-11    LFTs LIVER FUNCTIONS - ( 2022 06:51 )  Alb: 2.3 g/dL / Pro: 5.8 g/dL / ALK PHOS: 144 U/L / ALT: 12 U/L / AST: 19 U/L / GGT: x           Coagulopathy   Lipid Panel   A1c   Cardiac enzymes CARDIAC MARKERS ( 2022 00:19 )  x     / x     / x     / x     / 2.2 ng/mL  CARDIAC MARKERS ( 10 Jul 2022 19:12 )  x     / x     / x     / x     / 2.5 ng/mL      U/A     STUDIES & IMAGING:  Studies (EKG, EEG, EMG, etc):     Radiology (XR, CT, MR, U/S, TTE/DINAH):    < from: MR Head w/wo IV Cont (22 @ 15:23) >    ACC: [id number] EXAM:  [facility]                          PROCEDURE DATE:  2022      INTERPRETATION:  CLINICAL INDICATION: Altered mental status, seizures    Magnetic resonance imaging of the brain was carried out with transaxial   SPGR, FLAIR, fast spin echo T2 weighted images, axial susceptibility   weighted series, diffusion weighted series and sagittal T1 weighted   series on a 1.5 Anisa magnet. Post contrast axial, coronal and sagittal   T1 weighted images were obtained. 6 cc of Gadavist were intravenously   injected, 1.5 cc were discarded.    Comparison is made with the prior brain CT of 2022.    Mild to moderate atrophy is identified with ventricular and sulcal   prominence. Extensive small vessel white matter ischemic changes are   noted. There is extensive bifrontal parietal encephalomalacia and gliosis   involving the centrum semiovale ovale in the border zone regions. These   do not demonstrate diffusion restriction and demonstrate bright signal   intensity on T1-weighted images which does not lymphoma on susceptibility   weighted series suggesting laminar necrosis rather than old hemorrhage.   After contrast administration there is mild gyral enhancement in a small   portion of the frontal cortex. There is no evidence of cerebritis or   abscess.    IMPRESSION: Extensive bilateral frontal parietal gliosis and   encephalomalacia with minimal enhancement which may be related to   ischemic changes versus changes of hypertensive encephalopathy in a   patient with renal transplant on immunosuppressants. No evidence of   cerebritis or abscess.    --- End of Report ---    TIMOTHY GOMES MD; Attending Radiologist  This document has been electronically signed. 2022  4:03PM    < end of copied text >      vEEG  -  EEG SUMMARY/CLASSIFICATION    Abnormal EEG in an altered / a sedated patient.  - Rare right frontal and left frontal sharp waves  - Patient events as described above  - Left frontotemporal slowing  - Mild to moderate generalized slowing    _____________________________________________________________  EEG IMPRESSION/CLINICAL CORRELATE    Abnormal EEG study.  Event of left leg shaking was not epileptic.  Potential epileptogenic foci in the right frontal and left frontal regions.  Structural or functional abnormality in the left frontotemporal region.  Mild to moderate nonspecific diffuse or multifocal cerebral dysfunction.   No seizure seen.      MRI brain w/ and w/o  - Extensive bilateral frontal parietal gliosis and encephalomalacia with minimal enhancement which may be related to ischemic changes versus changes of hypertensive encephalopathy in a patient with renal transplant on immunosuppressants. No evidence of cerebritis or abscess.      < from: CT Head No Cont (22 @ 10:29) >    ACC: [id number] EXAM:  CT BRAIN                          PROCEDURE DATE:  2022      INTERPRETATION:  .    CLINICAL INFORMATION: Worsening lethargy. Altered mental status.    TECHNIQUE: Multiple axial CT images of the head were obtained without   contrast. Sagittal and coronal reconstructed images were acquired from   the source data.    COMPARISON: Prior head CT study from 2022. Prior brain MRI study   from 2022.    FINDINGS: Extensive encephalomalacia and gliosis are again noted within   the high bilateral cerebral hemispheres.. Overall appearance is unchanged.    There is no acute intracranial hemorrhage, mass effect, shift of the   midline structures, herniation, hydrocephalus, abnormal extra-axial fluid   collection.    There is diffuse cerebral volume loss with prominence of the sulci,   fissures, and cisternal spaces which is normal for the patient's age.   There is moderate patchy confluent periventricular white matter   hypoattenuation statistically compatible with microvascular changes given   calcific atherosclerotic disease of the intracranial arteries.    The paranasal sinuses and mastoid air cells are clear. The calvarium   appears intact. There is evidence of bilateral cataract removal.    IMPRESSION:No acute intracranial hemorrhage.    Similar-appearing extensive encephalomalacia and gliosis within the   bilateral high cerebral hemispheres.    Similar-appearing moderate severity chronic white matter microvascular   type changes.   SUBJECTIVE/INTERVAL HISTORY: Pt seen and examined at bedside with attending. Pt awake and alert, smiling, reports no issues.     PAST MEDICAL & SURGICAL HISTORY:  Hypertension      Diabetes      Dyslipidemia      CAD (Coronary Artery Disease)  with Stents in 2009 and 2019, s/p off-pump C3L on 20      Hypothyroidism      CVA (cerebral vascular accident)  19 with residual bilateral weakness      Anemia      PEG (percutaneous endoscopic gastrostomy) status  removed 2020      Intubation of airway performed without difficulty  Dec 2019  CVA c/b status epilepticus requiring intubation and PEG in 2019-      ESRD on dialysis  M/W/F      Seizures  after CVA, last seizure was last week 22      History of insertion of stent into coronary artery bypass graft   and 2019      S/P CABG x 3  off pump C3L on 20        FAMILY HISTORY:  Family history of cancer of tongue (Father)  Parents are  . Father - at 80 years, tongue cancer was a smoker. Mother- at 71, had accident while crossing road. Siblings- 2 brothers and one sister.        MEDICATIONS (HOME):  Home Medications:  amLODIPine 10 mg oral tablet: 1 tab(s) enteral once a day (2022 15:45)  apixaban 2.5 mg oral tablet: 1 tab(s) orally 2 times a day (2022 15:45)  atorvastatin 40 mg oral tablet: 1 tab(s) orally once a day (at bedtime) (2022 15:45)  CellCept 500 mg oral tablet: 1 tab(s) orally 2 times a day (2022 15:45)  Coreg 6.25 mg oral tablet: 1 tab(s) orally 2 times a day (2022 15:45)  divalproex sodium 250 mg oral delayed release tablet: 1 tab(s) orally once a day (2022 15:45)  divalproex sodium 500 mg oral delayed release tablet: 1 tab(s) orally 2 times a day (2022 15:45)  Envarsus XR 1 mg oral tablet, extended release: 3 tab(s) by gastrostomy tube once a day (in the morning) (2022 15:45)  fluconazole 200 mg oral tablet: 1 tab(s) orally once a day (2022 15:45)  HumaLOG 100 units/mL injectable solution: 5 unit(s) injectable 3 times a day (2022 15:45)  insulin glargine 100 units/mL subcutaneous solution: 6 unit(s) subcutaneous once a day (at bedtime) (2022 14:27)  levETIRAcetam 250 mg oral tablet: 1 tab(s) orally 2 times a day (2022 15:45)  levothyroxine 50 mcg (0.05 mg) oral tablet: 1 tab(s) orally once a day (2022 15:45)  magnesium oxide 400 mg oral tablet: 1 tab(s) orally 3 times a day (with meals) (2022 15:45)  Multiple Vitamins oral capsule: 1 cap(s) orally once a day (2022 15:45)  pantoprazole 40 mg oral delayed release tablet: 1 tab(s) orally once a day (before a meal) (2022 15:45)  predniSONE: 5 milligram(s) orally once a day (2022 15:45)  senna oral tablet: 2 tab(s) orally once a day (at bedtime) (2022 15:45)  sulfamethoxazole-trimethoprim 400 mg-80 mg oral tablet: 1 tab(s) orally once a day (2022 15:45)  valGANciclovir 450 mg oral tablet: 1 tab(s) orally 3 times a week (2022 15:45)    MEDICATIONS  (STANDING):  amLODIPine   Tablet 10 milliGRAM(s) Oral <User Schedule>  atorvastatin 40 milliGRAM(s) Oral at bedtime  atovaquone  Suspension 1500 milliGRAM(s) Oral <User Schedule>  chlorhexidine 2% Cloths 1 Application(s) Topical <User Schedule>  doxazosin 4 milliGRAM(s) Oral daily  epoetin cortney-epbx (RETACRIT) Injectable 73114 Unit(s) SubCutaneous every 7 days  finasteride 5 milliGRAM(s) Oral daily  fosphenytoin IVPB 100 milliGRAM(s) PE IV Intermittent every 12 hours  hydrALAZINE 100 milliGRAM(s) Oral every 8 hours  insulin lispro (ADMELOG) corrective regimen sliding scale   SubCutaneous every 6 hours  levothyroxine Injectable 40 MICROGram(s) IV Push at bedtime  melatonin 6 milliGRAM(s) Oral at bedtime  mycophenolate mofetil Suspension 1000 milliGRAM(s) Enteral Tube <User Schedule>  pantoprazole   Suspension 40 milliGRAM(s) Oral daily  polyethylene glycol 3350 17 Gram(s) Oral daily  predniSONE  Solution 5 milliGRAM(s) Oral daily  valGANciclovir 50 mG/mL Oral Solution 450 milliGRAM(s) Oral daily    MEDICATIONS  (PRN):  acetaminophen    Suspension .. 650 milliGRAM(s) Enteral Tube every 6 hours PRN Mild Pain (1 - 3)    ALLERGIES/INTOLERANCES:  Allergies  No Known Allergies    Intolerances    VITALS & EXAMINATION:  Vital Signs Last 24 Hrs  T(C): 36.7 (2022 12:00), Max: 36.9 (10 Jul 2022 20:26)  T(F): 98 (2022 12:00), Max: 98.4 (10 Jul 2022 20:26)  HR: 65 (2022 13:00) (60 - 67)  BP: 169/77 (2022 13:00) (139/71 - 178/77)  BP(mean): 114 (2022 01:10) (83 - 114)  RR: 19 (2022 12:00) (18 - 20)  SpO2: 100% (2022 13:00) (96% - 100%)    Parameters below as of 2022 12:00  Patient On (Oxygen Delivery Method): room air      General appearance: does not appear to be in pain  Head: normocephalic  Eyes: clear sclera  Extremities: no ezio LE edema  Respiratory: breathing regularly    Focused neurologic exam:  MS - awake, alert, oriented to person, place, not date, but perseverates and only follows simple commands rarely. Attend to stimuli from both sides.   CN - pupils equal round, EOMI, BTT b/l, no apparent facial asymmetry, hearing intact to conversation b/l   Motor - Normal bulk. Inc tone in L side and normal tone in R side. Spontaneous movements of L > R side. Grimaces, withdraws, and localizes to strong noxious in LUE 4+/5, LLE 4-/5, RUE 3+/5, RLE 2/5  Sens - LT/temp intact all, as above  Coord - PERRY  Gait and station - PERRY    LABORATORY:  CBC                       9.8    3.79  )-----------( 281      ( 2022 06:51 )             31.9     Chem 07-11    139  |  106  |  22  ----------------------------<  207<H>  4.2   |  24  |  1.15    Ca    8.4      2022 06:51  Phos  2.8     07-11  Mg     1.9     07-11    TPro  5.8<L>  /  Alb  2.3<L>  /  TBili  0.2  /  DBili  <0.1  /  AST  19  /  ALT  12  /  AlkPhos  144<H>  07-11    LFTs LIVER FUNCTIONS - ( 2022 06:51 )  Alb: 2.3 g/dL / Pro: 5.8 g/dL / ALK PHOS: 144 U/L / ALT: 12 U/L / AST: 19 U/L / GGT: x           Coagulopathy   Lipid Panel   A1c   Cardiac enzymes CARDIAC MARKERS ( 2022 00:19 )  x     / x     / x     / x     / 2.2 ng/mL  CARDIAC MARKERS ( 10 Jul 2022 19:12 )  x     / x     / x     / x     / 2.5 ng/mL      U/A     STUDIES & IMAGING:  Studies (EKG, EEG, EMG, etc):     Radiology (XR, CT, MR, U/S, TTE/DINAH):    < from: MR Head w/wo IV Cont (22 @ 15:23) >    ACC: [id number] EXAM:  MR BRAIN [facility] IC                          PROCEDURE DATE:  2022      INTERPRETATION:  CLINICAL INDICATION: Altered mental status, seizures    Magnetic resonance imaging of the brain was carried out with transaxial   SPGR, FLAIR, fast spin echo T2 weighted images, axial susceptibility   weighted series, diffusion weighted series and sagittal T1 weighted   series on a 1.5 Anisa magnet. Post contrast axial, coronal and sagittal   T1 weighted images were obtained. 6 cc of Gadavist were intravenously   injected, 1.5 cc were discarded.    Comparison is made with the prior brain CT of 2022.    Mild to moderate atrophy is identified with ventricular and sulcal   prominence. Extensive small vessel white matter ischemic changes are   noted. There is extensive bifrontal parietal encephalomalacia and gliosis   involving the centrum semiovale ovale in the border zone regions. These   do not demonstrate diffusion restriction and demonstrate bright signal   intensity on T1-weighted images which does not lymphoma on susceptibility   weighted series suggesting laminar necrosis rather than old hemorrhage.   After contrast administration there is mild gyral enhancement in a small   portion of the frontal cortex. There is no evidence of cerebritis or   abscess.    IMPRESSION: Extensive bilateral frontal parietal gliosis and   encephalomalacia with minimal enhancement which may be related to   ischemic changes versus changes of hypertensive encephalopathy in a   patient with renal transplant on immunosuppressants. No evidence of   cerebritis or abscess.    --- End of Report ---    TIMOTHY GOMES MD; Attending Radiologist  This document has been electronically signed. 2022  4:03PM    < end of copied text >      vEEG   EEG SUMMARY/CLASSIFICATION    Abnormal EEG in an altered / a sedated patient.  - Rare right frontal and left frontal sharp waves  - Patient events as described above  - Left frontotemporal slowing  - Mild to moderate generalized slowing    _____________________________________________________________  EEG IMPRESSION/CLINICAL CORRELATE    Abnormal EEG study.  Event of left leg shaking was not epileptic.  Potential epileptogenic foci in the right frontal and left frontal regions.  Structural or functional abnormality in the left frontotemporal region.  Mild to moderate nonspecific diffuse or multifocal cerebral dysfunction.   No seizure seen.      EEG   EEG SUMMARY/CLASSIFICATION    Abnormal EEG in an altered / a sedated patient.  - Infrequent left posterior sharp discharges  - Left frontotemporal slowing  - Mild to moderate generalized slowing    _____________________________________________________________  EEG IMPRESSION/CLINICAL CORRELATE  Structural or functional abnormality in the left frontotemporal region.  Mild to moderate nonspecific diffuse or multifocal cerebral dysfunction.   Findings indicate risk for focal seizures from the left posterior region.     _____________________________________________________________      EEG 7/5   EEG SUMMARY/CLASSIFICATION    Abnormal EEG in an altered / a sedated patient.  - Rare right frontal and left frontal sharp waves  - Patient events as described above  - Left frontotemporal slowing  - Mild to moderate generalized slowing    _____________________________________________________________  EEG IMPRESSION/CLINICAL CORRELATE    Abnormal EEG study.  Event of left leg shaking was not epileptic.  Potential epileptogenic foci in the right frontal and left frontal regions.  Structural or functional abnormality in the left frontotemporal region.  Mild to moderate nonspecific diffuse or multifocal cerebral dysfunction.   No seizure seen.    _____________________________________________________________ SUBJECTIVE/INTERVAL HISTORY: Pt seen and examined at bedside with attending. Pt awake and alert, smiling, reports no issues.     ROS: All systems negative except as documented in Subjective/Interval History    PAST MEDICAL & SURGICAL HISTORY:  Hypertension      Diabetes      Dyslipidemia      CAD (Coronary Artery Disease)  with Stents in 2009 and 2019, s/p off-pump C3L on 20      Hypothyroidism      CVA (cerebral vascular accident)  19 with residual bilateral weakness      Anemia      PEG (percutaneous endoscopic gastrostomy) status  removed 2020      Intubation of airway performed without difficulty  Dec 2019  CVA c/b status epilepticus requiring intubation and PEG in 2019-      ESRD on dialysis  M/W/F      Seizures  after CVA, last seizure was last week 22      History of insertion of stent into coronary artery bypass graft   and       S/P CABG x 3  off pump C3L on 20        FAMILY HISTORY:  Family history of cancer of tongue (Father)  Parents are  . Father - at 80 years, tongue cancer was a smoker. Mother- at 71, had accident while crossing road. Siblings- 2 brothers and one sister.        MEDICATIONS (HOME):  Home Medications:  amLODIPine 10 mg oral tablet: 1 tab(s) enteral once a day (2022 15:45)  apixaban 2.5 mg oral tablet: 1 tab(s) orally 2 times a day (2022 15:45)  atorvastatin 40 mg oral tablet: 1 tab(s) orally once a day (at bedtime) (2022 15:45)  CellCept 500 mg oral tablet: 1 tab(s) orally 2 times a day (2022 15:45)  Coreg 6.25 mg oral tablet: 1 tab(s) orally 2 times a day (2022 15:45)  divalproex sodium 250 mg oral delayed release tablet: 1 tab(s) orally once a day (2022 15:45)  divalproex sodium 500 mg oral delayed release tablet: 1 tab(s) orally 2 times a day (2022 15:45)  Envarsus XR 1 mg oral tablet, extended release: 3 tab(s) by gastrostomy tube once a day (in the morning) (2022 15:45)  fluconazole 200 mg oral tablet: 1 tab(s) orally once a day (2022 15:45)  HumaLOG 100 units/mL injectable solution: 5 unit(s) injectable 3 times a day (2022 15:45)  insulin glargine 100 units/mL subcutaneous solution: 6 unit(s) subcutaneous once a day (at bedtime) (2022 14:27)  levETIRAcetam 250 mg oral tablet: 1 tab(s) orally 2 times a day (2022 15:45)  levothyroxine 50 mcg (0.05 mg) oral tablet: 1 tab(s) orally once a day (2022 15:45)  magnesium oxide 400 mg oral tablet: 1 tab(s) orally 3 times a day (with meals) (2022 15:45)  Multiple Vitamins oral capsule: 1 cap(s) orally once a day (2022 15:45)  pantoprazole 40 mg oral delayed release tablet: 1 tab(s) orally once a day (before a meal) (2022 15:45)  predniSONE: 5 milligram(s) orally once a day (2022 15:45)  senna oral tablet: 2 tab(s) orally once a day (at bedtime) (2022 15:45)  sulfamethoxazole-trimethoprim 400 mg-80 mg oral tablet: 1 tab(s) orally once a day (2022 15:45)  valGANciclovir 450 mg oral tablet: 1 tab(s) orally 3 times a week (2022 15:45)    MEDICATIONS  (STANDING):  amLODIPine   Tablet 10 milliGRAM(s) Oral <User Schedule>  atorvastatin 40 milliGRAM(s) Oral at bedtime  atovaquone  Suspension 1500 milliGRAM(s) Oral <User Schedule>  chlorhexidine 2% Cloths 1 Application(s) Topical <User Schedule>  doxazosin 4 milliGRAM(s) Oral daily  epoetin cortney-epbx (RETACRIT) Injectable 22630 Unit(s) SubCutaneous every 7 days  finasteride 5 milliGRAM(s) Oral daily  fosphenytoin IVPB 100 milliGRAM(s) PE IV Intermittent every 12 hours  hydrALAZINE 100 milliGRAM(s) Oral every 8 hours  insulin lispro (ADMELOG) corrective regimen sliding scale   SubCutaneous every 6 hours  levothyroxine Injectable 40 MICROGram(s) IV Push at bedtime  melatonin 6 milliGRAM(s) Oral at bedtime  mycophenolate mofetil Suspension 1000 milliGRAM(s) Enteral Tube <User Schedule>  pantoprazole   Suspension 40 milliGRAM(s) Oral daily  polyethylene glycol 3350 17 Gram(s) Oral daily  predniSONE  Solution 5 milliGRAM(s) Oral daily  valGANciclovir 50 mG/mL Oral Solution 450 milliGRAM(s) Oral daily    MEDICATIONS  (PRN):  acetaminophen    Suspension .. 650 milliGRAM(s) Enteral Tube every 6 hours PRN Mild Pain (1 - 3)    ALLERGIES/INTOLERANCES:  Allergies  No Known Allergies    Intolerances    VITALS & EXAMINATION:  Vital Signs Last 24 Hrs  T(C): 36.7 (2022 12:00), Max: 36.9 (10 Jul 2022 20:26)  T(F): 98 (2022 12:00), Max: 98.4 (10 Jul 2022 20:26)  HR: 65 (2022 13:00) (60 - 67)  BP: 169/77 (2022 13:00) (139/71 - 178/77)  BP(mean): 114 (2022 01:10) (83 - 114)  RR: 19 (2022 12:00) (18 - 20)  SpO2: 100% (2022 13:00) (96% - 100%)    Parameters below as of 2022 12:00  Patient On (Oxygen Delivery Method): room air      General appearance: does not appear to be in pain  Head: normocephalic  Eyes: clear sclera  Extremities: no ezio LE edema  Respiratory: breathing regularly    Focused neurologic exam:  MS - awake, alert, oriented to person, place, not date, but perseverates and only follows simple commands rarely. Attend to stimuli from both sides.   CN - pupils equal round, EOMI, BTT b/l, no apparent facial asymmetry, hearing intact to conversation b/l   Motor - Normal bulk. Inc tone in L side and normal tone in R side. Spontaneous movements of L > R side. Grimaces, withdraws, and localizes to strong noxious in LUE 4+/5, LLE 4-/5, RUE 3+/5, RLE 2/5  Sens - LT/temp intact all, as above  Coord - PERRY  Gait and station - PERRY    LABORATORY:  CBC                       9.8    3.79  )-----------( 281      ( 2022 06:51 )             31.9     Chem 07-11    139  |  106  |  22  ----------------------------<  207<H>  4.2   |  24  |  1.15    Ca    8.4      2022 06:51  Phos  2.8     07-11  Mg     1.9     07-11    TPro  5.8<L>  /  Alb  2.3<L>  /  TBili  0.2  /  DBili  <0.1  /  AST  19  /  ALT  12  /  AlkPhos  144<H>  07-11    LFTs LIVER FUNCTIONS - ( 2022 06:51 )  Alb: 2.3 g/dL / Pro: 5.8 g/dL / ALK PHOS: 144 U/L / ALT: 12 U/L / AST: 19 U/L / GGT: x           Coagulopathy   Lipid Panel   A1c   Cardiac enzymes CARDIAC MARKERS ( 2022 00:19 )  x     / x     / x     / x     / 2.2 ng/mL  CARDIAC MARKERS ( 10 Jul 2022 19:12 )  x     / x     / x     / x     / 2.5 ng/mL      U/A     STUDIES & IMAGING:  Studies (EKG, EEG, EMG, etc):     Radiology (XR, CT, MR, U/S, TTE/DINAH):    < from: MR Head w/wo IV Cont (22 @ 15:23) >    ACC: [id number] EXAM:  MR BRAIN WAW IC                          PROCEDURE DATE:  2022      INTERPRETATION:  CLINICAL INDICATION: Altered mental status, seizures    Magnetic resonance imaging of the brain was carried out with transaxial   SPGR, FLAIR, fast spin echo T2 weighted images, axial susceptibility   weighted series, diffusion weighted series and sagittal T1 weighted   series on a 1.5 Anisa magnet. Post contrast axial, coronal and sagittal   T1 weighted images were obtained. 6 cc of Gadavist were intravenously   injected, 1.5 cc were discarded.    Comparison is made with the prior brain CT of 2022.    Mild to moderate atrophy is identified with ventricular and sulcal   prominence. Extensive small vessel white matter ischemic changes are   noted. There is extensive bifrontal parietal encephalomalacia and gliosis   involving the centrum semiovale ovale in the border zone regions. These   do not demonstrate diffusion restriction and demonstrate bright signal   intensity on T1-weighted images which does not lymphoma on susceptibility   weighted series suggesting laminar necrosis rather than old hemorrhage.   After contrast administration there is mild gyral enhancement in a small   portion of the frontal cortex. There is no evidence of cerebritis or   abscess.    IMPRESSION: Extensive bilateral frontal parietal gliosis and   encephalomalacia with minimal enhancement which may be related to   ischemic changes versus changes of hypertensive encephalopathy in a   patient with renal transplant on immunosuppressants. No evidence of   cerebritis or abscess.    --- End of Report ---    TIMOTHY GOMES MD; Attending Radiologist  This document has been electronically signed. 2022  4:03PM    < end of copied text >      vEEG   EEG SUMMARY/CLASSIFICATION    Abnormal EEG in an altered / a sedated patient.  - Rare right frontal and left frontal sharp waves  - Patient events as described above  - Left frontotemporal slowing  - Mild to moderate generalized slowing    _____________________________________________________________  EEG IMPRESSION/CLINICAL CORRELATE    Abnormal EEG study.  Event of left leg shaking was not epileptic.  Potential epileptogenic foci in the right frontal and left frontal regions.  Structural or functional abnormality in the left frontotemporal region.  Mild to moderate nonspecific diffuse or multifocal cerebral dysfunction.   No seizure seen.      EEG   EEG SUMMARY/CLASSIFICATION    Abnormal EEG in an altered / a sedated patient.  - Infrequent left posterior sharp discharges  - Left frontotemporal slowing  - Mild to moderate generalized slowing    _____________________________________________________________  EEG IMPRESSION/CLINICAL CORRELATE  Structural or functional abnormality in the left frontotemporal region.  Mild to moderate nonspecific diffuse or multifocal cerebral dysfunction.   Findings indicate risk for focal seizures from the left posterior region.     _____________________________________________________________      EEG 7/5   EEG SUMMARY/CLASSIFICATION    Abnormal EEG in an altered / a sedated patient.  - Rare right frontal and left frontal sharp waves  - Patient events as described above  - Left frontotemporal slowing  - Mild to moderate generalized slowing    _____________________________________________________________  EEG IMPRESSION/CLINICAL CORRELATE    Abnormal EEG study.  Event of left leg shaking was not epileptic.  Potential epileptogenic foci in the right frontal and left frontal regions.  Structural or functional abnormality in the left frontotemporal region.  Mild to moderate nonspecific diffuse or multifocal cerebral dysfunction.   No seizure seen.    _____________________________________________________________

## 2022-07-11 NOTE — PROGRESS NOTE ADULT - SUBJECTIVE AND OBJECTIVE BOX
[facility] DIVISION OF KIDNEY DISEASES AND HYPERTENSION -- FOLLOW UP NOTE  --------------------------------------------------------------------------------  Authored by: Ozzie Nova   Cell # [phone]     CHAD DUNBAR was seen and examined at bedside.   Patient is a 66y old  Male who presents with a chief complaint of Status Epilepticus (07-11-22)      24 hour events/subjective: ST elevation seen on tele overnight, 12 lead EKG normal and troponin x2 negative.   Pt reports feeling well. Awaiting transplant ureteric stent removal       Standing Inpatient Medications  amLODIPine   Tablet 10 milliGRAM(s) Oral <User Schedule>  atorvastatin 40 milliGRAM(s) Oral at bedtime  atovaquone  Suspension 1500 milliGRAM(s) Oral <User Schedule>  doxazosin 4 milliGRAM(s) Oral daily  epoetin cortney-epbx (RETACRIT) Injectable 92967 Unit(s) SubCutaneous every 7 days  finasteride 5 milliGRAM(s) Oral daily  fosphenytoin IVPB 100 milliGRAM(s) PE IV Intermittent every 12 hours  hydrALAZINE 100 milliGRAM(s) Oral every 8 hours  insulin lispro (ADMELOG) corrective regimen sliding scale   SubCutaneous every 6 hours  levothyroxine Injectable 40 MICROGram(s) IV Push at bedtime  melatonin 6 milliGRAM(s) Oral at bedtime  mycophenolate mofetil Suspension 1000 milliGRAM(s) Enteral Tube <User Schedule>  pantoprazole   Suspension 40 milliGRAM(s) Oral daily  polyethylene glycol 3350 17 Gram(s) Oral daily  predniSONE  Solution 5 milliGRAM(s) Oral daily  valGANciclovir 50 mG/mL Oral Solution 450 milliGRAM(s) Oral daily    PRN Inpatient Medications  acetaminophen    Suspension .. 650 milliGRAM(s) Enteral Tube every 6 hours PRN        VITALS/PHYSICAL EXAM  --------------------------------------------------------------------------------  T(C): 36.7 (07-11-22 @ 12:00), Max: 36.9 (07-10-22 @ 20:26)  HR: 67 (07-11-22 @ 12:00) (60 - 67)  BP: 178/77 (07-11-22 @ 12:00) (139/71 - 178/77)  RR: 19 (07-11-22 @ 12:00) (16 - 20)  SpO2: 100% (07-11-22 @ 12:00) (96% - 100%)    07-10-22 @ 07:01  -  07-11-22 @ 07:00  --------------------------------------------------------  IN: 1410 mL / OUT: 1980 mL / NET: -570 mL      Physical Exam:  Awake, responsive, speech coherent, oriented x3   NGT +  Lungs clear   Abdomen soft and non tender  Bilateral UE and LE 2+ edema       LABS/STUDIES  --------------------------------------------------------------------------------              9.8    3.79  >-----------<  281      [07-11-22 @ 06:51]              31.9     139  |  106  |  22  ----------------------------<  207      [07-11-22 @ 06:51]  4.2   |  24  |  1.15        Ca     8.4     [07-11-22 @ 06:51]      Mg     1.9     [07-11-22 @ 06:51]      Phos  2.8     [07-11-22 @ 06:51]    TPro  5.8  /  Alb  2.3  /  TBili  0.2  /  DBili  <0.1  /  AST  19  /  ALT  12  /  AlkPhos  144  [07-11-22 @ 06:51]      Creatinine Trend:  SCr 1.15 [07-11 @ 06:51]  SCr 1.16 [07-10 @ 19:12]  SCr 1.17 [07-10 @ 05:53]  SCr 1.25 [07-09 @ 19:17]  SCr 1.25 [07-09 @ 06:02]        CAPILLARY BLOOD GLUCOSE  POCT Blood Glucose.: 195 mg/dL (11 Jul 2022 05:53)  POCT Blood Glucose.: 167 mg/dL (11 Jul 2022 00:11)  POCT Blood Glucose.: 201 mg/dL (10 Jul 2022 17:54)      Urinalysis - [07-03-22 @ 14:32]      Color Light Yellow / Appearance Clear / SG 1.018 / pH 8.0      Gluc Negative / Ketone Negative  / Bili Negative / Urobili Negative       Blood Negative / Protein Trace / Leuk Est Negative / Nitrite Negative      RBC 1 / WBC 2 / Hyaline 0 / Gran  / Sq Epi  / Non Sq Epi 1 / Bacteria Negative      Iron 17, TIBC 171, %sat 10      [05-02-22 @ 13:03]  Ferritin 1505      [05-02-22 @ 13:05]  PTH -- (Ca 9.2)      [02-05-22 @ 10:13]   294  HbA1c 6.0      [02-21-20 @ 08:53]  TSH 4.69      [07-06-22 @ 18:36]      Syphilis Screen (Treponema Pallidum Ab) Negative      [06-27-22 @ 02:00]

## 2022-07-11 NOTE — PROGRESS NOTE ADULT - SUBJECTIVE AND OBJECTIVE BOX
DIABETES FOLLOW UP NOTE: Saw pt earlier today    Chief Complaint: Endocrine consult requested for management of T2DM    INTERVAL HX: Pt stable, he was NPO this am for urological pricedure but it was cancelled and rescheduled for tomorrow. Per RN, pTFs were not stopped post mn so pt will be off TFs at mn tonight. Pt getting Nepro at 40cc/hr at time of visit with BG in 100s today. No hypoglycemia. Pt denies any N/V/SOB or pain at time of visit. Reports feeling better. On Prednisone 5mg daily.         Review of Systems:  General: As above  Cardiovascular: No chest pain, palpitations  Respiratory: No SOB, no cough  GI: No nausea, vomiting, abdominal pain  Endocrine: No S&Sx of hypoglycemia    Allergies    No Known Allergies    Intolerances      MEDICATIONS:  atorvastatin 40 milliGRAM(s) Oral at bedtime  atovaquone  Suspension 1500 milliGRAM(s) Oral <User Schedule>  insulin lispro (ADMELOG) corrective regimen sliding scale   SubCutaneous every 6 hours  levothyroxine Injectable 40 MICROGram(s) IV Push at bedtime  predniSONE  Solution 5 milliGRAM(s) Oral daily  valGANciclovir 50 mG/mL Oral Solution 450 milliGRAM(s) Oral daily      PHYSICAL EXAM:  VITALS: T(C): 37.2 (07-11-22 @ 16:59)  T(F): 99 (07-11-22 @ 16:59), Max: 99 (07-11-22 @ 16:59)  HR: 64 (07-11-22 @ 16:59) (60 - 67)  BP: 139/64 (07-11-22 @ 16:59) (139/64 - 178/77)  RR:  (18 - 20)  SpO2:  (96% - 100%)  Wt(kg): --  GENERAL: Male laying in bed in NAD  Abdomen: Soft, nontender, non distended  Extremities: Warm, no edema in all 4 exts  NEURO: Alert and able to answer simple questions    LABS:  POCT Blood Glucose.: 196 mg/dL (07-11-22 @ 17:36)  POCT Blood Glucose.: 174 mg/dL (07-11-22 @ 13:00)  POCT Blood Glucose.: 195 mg/dL (07-11-22 @ 05:53)  POCT Blood Glucose.: 167 mg/dL (07-11-22 @ 00:11)  POCT Blood Glucose.: 201 mg/dL (07-10-22 @ 17:54)  POCT Blood Glucose.: 240 mg/dL (07-10-22 @ 11:30)  POCT Blood Glucose.: 165 mg/dL (07-10-22 @ 05:02)  POCT Blood Glucose.: 189 mg/dL (07-09-22 @ 23:30)  POCT Blood Glucose.: 226 mg/dL (07-09-22 @ 18:05)  POCT Blood Glucose.: 195 mg/dL (07-09-22 @ 12:30)  POCT Blood Glucose.: 154 mg/dL (07-09-22 @ 05:24)  POCT Blood Glucose.: 176 mg/dL (07-08-22 @ 23:34)                            9.8    3.79  )-----------( 281      ( 11 Jul 2022 06:51 )             31.9       07-11    139  |  106  |  22  ----------------------------<  207<H>  4.2   |  24  |  1.15    eGFR: 70    Ca    8.4      07-11  Mg     1.9     07-11  Phos  2.8     07-11    TPro  5.8<L>  /  Alb  2.3<L>  /  TBili  0.2  /  DBili  <0.1  /  AST  19  /  ALT  12  /  AlkPhos  144<H>  07-11      Thyroid Function Tests:  07-06 @ 18:36 TSH 4.69 FreeT4 -- T3 43 Anti TPO -- Anti Thyroglobulin Ab -- TSI --      A1C with Estimated Average Glucose Result: 6.0 % (06-30-22 @ 05:49)  A1C with Estimated Average Glucose Result: 6.6 % (04-24-22 @ 17:12)      Estimated Average Glucose: 126 mg/dL (06-30-22 @ 05:49)  Estimated Average Glucose: 143 mg/dL (04-24-22 @ 17:12)

## 2022-07-11 NOTE — PROGRESS NOTE ADULT - SUBJECTIVE AND OBJECTIVE BOX
[facility] NEPHROLOGY PRACTICE   MD NINA MASON MD, PA KRISTINE SOLTANPOUR, DO INJUNG KO, NP    TEL:  OFFICE: [phone]    From 5pm-7am Answering Service [phone]    -- RENAL FOLLOW UP NOTE ---Date of Service 07-11-22 @ 14:14    Patient is a 66y old  Male who presents with a chief complaint of Status Epilepticus (11 Jul 2022 12:35)      Patient seen and examined at bedside. No chest pain/sob    VITALS:  T(F): 98 (07-11-22 @ 12:00), Max: 98.4 (07-10-22 @ 20:26)  HR: 65 (07-11-22 @ 13:00)  BP: 169/77 (07-11-22 @ 13:00)  RR: 19 (07-11-22 @ 12:00)  SpO2: 100% (07-11-22 @ 13:00)  Wt(kg): --    07-10 @ 07:01  -  07-11 @ 07:00  --------------------------------------------------------  IN: 1410 mL / OUT: 1980 mL / NET: -570 mL          PHYSICAL EXAM:  Constitutional: NAD  Neck: No JVD  Respiratory: CTAB, no wheezes, rales or rhonchi  Cardiovascular: S1, S2, RRR  Gastrointestinal: BS+, soft, NT/ND  Extremities: + peripheral edema    Hospital Medications:   MEDICATIONS  (STANDING):  amLODIPine   Tablet 10 milliGRAM(s) Oral <User Schedule>  atorvastatin 40 milliGRAM(s) Oral at bedtime  atovaquone  Suspension 1500 milliGRAM(s) Oral <User Schedule>  chlorhexidine 2% Cloths 1 Application(s) Topical <User Schedule>  doxazosin 4 milliGRAM(s) Oral daily  epoetin cortney-epbx (RETACRIT) Injectable 52696 Unit(s) SubCutaneous every 7 days  finasteride 5 milliGRAM(s) Oral daily  fosphenytoin IVPB 100 milliGRAM(s) PE IV Intermittent every 12 hours  hydrALAZINE 100 milliGRAM(s) Oral every 8 hours  insulin lispro (ADMELOG) corrective regimen sliding scale   SubCutaneous every 6 hours  levothyroxine Injectable 40 MICROGram(s) IV Push at bedtime  melatonin 6 milliGRAM(s) Oral at bedtime  mycophenolate mofetil Suspension 1000 milliGRAM(s) Enteral Tube <User Schedule>  pantoprazole   Suspension 40 milliGRAM(s) Oral daily  polyethylene glycol 3350 17 Gram(s) Oral daily  predniSONE  Solution 5 milliGRAM(s) Oral daily  valGANciclovir 50 mG/mL Oral Solution 450 milliGRAM(s) Oral daily      LABS:  07-11    139  |  106  |  22  ----------------------------<  207<H>  4.2   |  24  |  1.15    Ca    8.4      11 Jul 2022 06:51  Phos  2.8     07-11  Mg     1.9     07-11    TPro  5.8<L>  /  Alb  2.3<L>  /  TBili  0.2  /  DBili  <0.1  /  AST  19  /  ALT  12  /  AlkPhos  144<H>  07-11    Creatinine Trend: 1.15 <--, 1.16 <--, 1.17 <--, 1.25 <--, 1.25 <--, 1.34 <--, 1.45 <--, 1.46 <--, 1.56 <--, 1.55 <--, 1.53 <--, 1.61 <--, 1.71 <--, 1.69 <--, 1.66 <--, 1.67 <--, 1.60 <--, 1.65 <--, 1.52 <--, 1.52 <--, 1.50 <--    Phosphorus Level, Serum: 2.8 mg/dL (07-11 @ 06:51)  Albumin, Serum: 2.3 g/dL (07-11 @ 06:51)  Phosphorus Level, Serum: 3.6 mg/dL (07-10 @ 19:12)  Albumin, Serum: 2.2 g/dL (07-10 @ 19:12)                              9.8    3.79  )-----------( 281      ( 11 Jul 2022 06:51 )             31.9     Urine Studies:  Urinalysis - [07-03-22 @ 14:32]      Color Light Yellow / Appearance Clear / SG 1.018 / pH 8.0      Gluc Negative / Ketone Negative  / Bili Negative / Urobili Negative       Blood Negative / Protein Trace / Leuk Est Negative / Nitrite Negative      RBC 1 / WBC 2 / Hyaline 0 / Gran  / Sq Epi  / Non Sq Epi 1 / Bacteria Negative      Iron 17, TIBC 171, %sat 10      [05-02-22 @ 13:03]  Ferritin 1505      [05-02-22 @ 13:05]  PTH -- (Ca 9.2)      [02-05-22 @ 10:13]   294  HbA1c 6.0      [02-21-20 @ 08:53]  TSH 4.69      [07-06-22 @ 18:36]      Syphilis Screen (Treponema Pallidum Ab) Negative      [06-27-22 @ 02:00]    RADIOLOGY & ADDITIONAL STUDIES:

## 2022-07-11 NOTE — PROGRESS NOTE ADULT - NUTRITIONAL ASSESSMENT
Diet, NPO after Midnight:      NPO Start Date: 11-Jul-2022,   NPO Start Time: 23:59 (07-11-22 @ 09:45) [Active]  Diet, NPO with Tube Feed:   Tube Feeding Modality: Nasogastric  Nepro with Carb Steady (NEPRORTH)  Total Volume for 24 Hours (mL): 960  Continuous  Starting Tube Feed Rate {mL per Hour}: 20  Increase Tube Feed Rate by (mL): 10     Every 4 hours  Until Goal Tube Feed Rate (mL per Hour): 40  Tube Feed Duration (in Hours): 24  Tube Feed Start Time: 07:00 (07-06-22 @ 11:20) [Active]      Please see RD assessment and/or follow up.  Managed by primary team as well

## 2022-07-11 NOTE — PROGRESS NOTE ADULT - ASSESSMENT
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   Hospitalized May 2022 for anemia in the setting of large perinephric hematoma s/p evacuation and repair of arterial anastomosis on 5/2/22. Intra operative biopsy showed no rejection.  H/o seizure d/o with last seizure episode prior to transplant was on 4/8/22. He is readmitted for status epilepticus in the setting of sub therapeutic valproic acid levels. Course complicated by respiratory failure, hemothorax and hemorrhagic shock following right thoracentesis done on 6/10 for effusion. S/p PRBC x 5 units and vasopressors, chest tube, followed by VATS.    Envarsus discontinued and started on belatacept due to concern for PRES on head imaging - repeat MRI head not consistent with PRES. [place] function is stable, mental status is slowly improving.     PMH: DM type II, HTN, CAD s/p CABG in 2020, Afib on Eliquis, CVA in 2019 due to Afib, Seizure d/o following CVA, h/o GI bleed in 2/2022 EGD with duodenal ulcer.    Donor was 58 , KDPI 82%, DCD, single vessels and ureter, HLA mismatch 1, 2, 2. No DSA, cPRA 0%. CMV +/+    1. S/p DCD DDRT on 4/21/22  - creatinine stable at 1.15mg/dL. Volume expansion present b/l UE and LE edema.      Give lasix 40mg iv     2. Immunosuppression - Envarsus discontinued for possible neurotoxicity (possible PRES, Seizures). Continue Belatacept, next dose on 7/18      Continue Cellcept 1000 mg po bid     Prednisone 5mg po daily      Infection prophylaxis - continue atovaquone (bactrim d/darren for hyperkalemia), continue Valcyte 450mg po daily (CMV intermediate risk)    3. Seizure d/o -admitted with status epilepticus . Now controlled on fosphenytoin. Neuro following.       Will discuss with neuro re alternative anti seizure meds given interaction b/n Eliquis and fosphenytoin     4. HTN: Hydralazine 100mg q8, Amlodipine 10mg po daily, Doxazosin 4mg     5. BPH with urine retention - continue Proscar and doxazosin,   bladder scan - check post void residual     6. Anemia - stable, Continue epo 10,000 weekly     7. Atrial fibrillation - was on Eliquis, now on therapeutic Lovenox due to drug interaction with fosphenytoin

## 2022-07-11 NOTE — PROGRESS NOTE ADULT - ATTENDING COMMENTS
Interval History as per resident note, personally verified by me. Doing better, patient without complaints and resting comfortably in bed.    Focused neurologic exam:  MS - AAO x 1.25 (knew he was on [place]), dec speech output and hypophonic but otherwise fluent, follows simple commands. Rep/naming, attn/conc, recent and remote memory, fund of knowledge dec  CN - PERRL, EOMI, VFF, face sens/str intact b/l, hearing intact to conversation b/l, SCM at least 4+/5 b/l and symmetric, tongue/palate midline  Motor - Normal bulk. Inc tone in L side (paratonic?) and normal tone in R side. R side 4-4+/5, L side 4+/5  Sens - LT/temp intact all, as above  DTR's - 2+ BUE's, 3+ R KJ, 2+ L KJ, 1+ AJ b/l, and neutral R and downgoing L plantar response  Coord - Grossly appropriate for level of strength  Gait and station - Due to fall risk/safety concerns did not assess    A/P:  Epilepsy, intractable, with resolved status epilepticus  Encephalopathy  S/p renal transplant, ANA  Prior strokes  Atrial fibrillation  CAD  HTN  DM type 2    - Patient with multiple non-convulsive electrographic seizures that were initially refractory. These only presented as worsening mental status. He was also hypothermic with lower WBC. Seizures, leukocytosis, and hypothermia have since resolved on current therapy. Although CNS infection should be on the differential, especially given immunocompromised state with renal transplant, given low temperature and low WBC would favor more systemic process as opposed to primary CNS. Appreciate ID input  - vEEG without seizures, including LLE movements without EEG correlate. STOPPED  - Phenytoin corrected level mildly supratherapeutic and may be contributing to encephalopathy (phenytoin 16.1 and corrected 22.0). Would change dosage to phenytoin maintenance with 50mg QAM and 100mg QPM. Please check new phenytoin trough (30-60 minutes before dose) and LFT's on 7/13 AM. May adjust then depending on level  - Behavioral changes noted, stopped Fycompa  - Vimpat and VPA stopped. Due to sedation also stopped standing Ativan  - LP done and appears fairly bland but awaiting full results. Minimal increase of protein to 46 (from normal ceiling of 45) is non-specific but may be related to recently resolved status epilepticus. Will await full results. Recommending restarting Eliquis if no other invasive procedures planned or medical/surgical contraindications. Await results for VZV Ab's, VDRL, oligoclonal bands, cytology  - Repeat MRI brain w/ and w/o without clear evidence of infection, inflammation, or acute CNS event (possibly minimal enhancement). 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

## 2022-07-11 NOTE — PROGRESS NOTE ADULT - ASSESSMENT
66 yr old M with Type 2 DM> unknown control due to skewed A1C 6.6% due to chronic anemia and s/p blood tx. On basal/bolus insulin therapy PTA. DM c/b ESRD s/p DDRT on 3/21, CVA, CAD s/p CABG, Afib on apixaban, seizure activity, HTN, HLD, h/o GI bleed in 2/2022 EGD with duodenal ulcer, discharged to [facility] rehab center after prior hospitalization, now re-admitted from [facility] for seizures c/f status epilepticus in setting of UTI. endocrine consulted for steroid induced hyperglycemia, now on home dose prednisone 5mg. Prolonged hospital course w/ ICU stay, previously intubated/extubated, previous seizures. TF Stopped this am for procedure but it was rescheduled for tomorrow so pt will be NPO tonight, Noted BG high 100s to low 200s while on TFs. He migght benefit from low dose Lantus while on TFs but since he will be off TFs tonight will start tomorrow. BG goal 100 to 180s.

## 2022-07-11 NOTE — PROGRESS NOTE ADULT - PROBLEM SELECTOR PLAN 1
-Test BG q6h while on TFs or NPO  -C/w low dose Admelog correction scale q6h for now  -Might start Lantus 4 units q hs once back on TFs tomorrow after procedure  -Please inform endo team of any changes on steroid therapy or PO/TF status  -Will follow and adjust as needed  Discharge  - Likely discharge on basal bolus insulin, Plan TBD based on insulin requirements at DC.   Outpatient f/u with Endocrinologist Dr. Lincoln. [address] [facility] [address]. Phone  Needs apt at time of discharge  -Needs optho/renal/cardiac f/u as out pt  -Make sure pt has insulin sand DM supplies at time of discharge.

## 2022-07-11 NOTE — PROGRESS NOTE ADULT - PROBLEM SELECTOR PLAN 3
-C/w levothyroxine Injectable 40 MICROGram(s) IV Push at bedtime  -Once taking POs can restart LT4 50 mcg daily on an empty stomach at least 1 hour apart from food or PO meds  - monitor TFTs outpatient.

## 2022-07-12 NOTE — PROGRESS NOTE ADULT - ATTENDING COMMENTS
Interval History as per resident note, personally verified by me. Doing better, patient without complaints and resting comfortably in bed. Had ureteral stent removed today. Seen with family, at bedside.    Focused neurologic exam:  MS Ricarda RASMUSSEN x 1.25 (knew he was on [place]), dec speech output and hypophonic but otherwise fluent, follows simple commands. Rep/naming, attn/conc, recent and remote memory, fund of knowledge dec  CN - PERRL, EOMI, VFF, face sens/str intact b/l, hearing intact to conversation b/l, SCM at least 4+/5 b/l and symmetric, tongue/palate midline  Motor - Normal bulk. Inc tone in L side (paratonic?) and normal tone in R side. R side 4-4+/5, L side 4+/5  Sens - LT/temp intact all, as above  DTR's - 2+ BUE's, 3+ R KJ, 2+ L KJ, 1+ AJ b/l, and neutral R and downgoing L plantar response  Coord - Grossly appropriate for level of strength  Gait and station - Due to fall risk/safety concerns did not assess    A/P:  Epilepsy, intractable, with resolved status epilepticus  Encephalopathy  S/p renal transplant, ANA  Prior strokes  Atrial fibrillation  CAD  HTN  DM type 2    - Patient with multiple non-convulsive electrographic seizures that were initially refractory. These only presented as worsening mental status. He was also hypothermic with lower WBC. Seizures, leukocytosis, and hypothermia have since resolved on current therapy. Although CNS infection should be on the differential, especially given immunocompromised state with renal transplant, given low temperature and low WBC would favor more systemic process as opposed to primary CNS. Appreciate ID input  - vEEG without seizures, including LLE movements without EEG correlate. STOPPED  - Phenytoin corrected level mildly supratherapeutic and may be contributing to encephalopathy (phenytoin 16.1 and corrected 22.0). Changed dosage to phenytoin maintenance with 50mg QAM and 100mg QPM. Please check new phenytoin trough (30-60 minutes before dose) and LFT's on 7/13 AM. May adjust then depending on level  - Behavioral changes noted, stopped Fycompa  - Vimpat and VPA stopped. Due to sedation also stopped standing Ativan  - LP done and appears fairly bland but awaiting full results. Minimal increase of protein to 46 (from normal ceiling of 45) is non-specific but may be related to recently resolved status epilepticus. Further results unrevealing  - Discussed restarting Eliquis for secondary stroke prevention with family and kidney transplant service. As this interacts with phenytoin would not be as ideal as it would be at lower efficacy. Also not optimal to change AED's given patient has experienced a number of side effects and he has a lower threshold for further seizures. Best options would be either Lovenox or warfarin (adjust to goal INR 2-3) long term. Family and other medical teams are in agreement  - Repeat MRI brain w/ and w/o without clear evidence of infection, inflammation, or acute CNS event (possibly minimal enhancement). 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

## 2022-07-12 NOTE — PROGRESS NOTE ADULT - ASSESSMENT
66M with h/o DM type II, HTN, CAD s/p CABG in 2020, Afib on Eliquis, CVA in 2019 due to Afib, Seizure d/o (last episode was on 4/8/22), h/o GI bleed in 2/2022 EGD with duodenal ulcer and ESRD on HD s/p R DDRT from [facility] donor on 4/21/22 complicated by DGF requiring HD until 4/29/22 now with good graft function who presented with status epilepticus in setting of UTI/antibiotics and sub-therapeutic valproic acid level     Seizure Disorder:  - Repeat MRI head 6/27 with less concerns for PRES, likely ischemic changes  - Remains seizure free  - Antiepileptic regimen per Neurology, currently on fosphenytoin 100mg Q1H. Will f/u Neuro recs and discuss PO options once tolerates PO  - Keep Mag > 2  - Infectious w/u negative so far, has been off ABX.      R DCD DDRT (ureter stented) 4/21/22:  - Lasix 40 IVP x 1  - Excellent renal function  - Will plan for removal of ureteral stent tomorrow (Tuesday).  Pre-op mode Tonight.  Hold Lovenox starting tonight and restart after procedure.   - Strict I/Os, Continue Doxazosin/Proscar. Monitor UOP  - Continue TFs via NGT, passed bedside S&S in SICU, formal S&S ordered  - No PEG at this time per family  - OOB with RN/PT    Hyperkalemia:   - resolved. Continue to monitor    Immunosuppression:   - Belatacept: 6/23, 7/4 (initial loading).  Re-loaded 7/8 as there was a gap between doses 2/2 seizures. next dose 7/18  - MMF 1g BID  - continue Pred 5  - PPx:  Mepron/Valcyte/PPI    DM  - on Lantus/Lispro, Endocrine following     AFib:  - continue Retacrit weekly  - Eliquis with ~40% less efficacy while on fosphenytoin.  Switched back to Lovenox 60BID. HOLD tonight for stent removal tomorrow  - rate controlled    R IJ DVT  - Continue tLovenox due to drug interactions between fosphenytoin and coumadin/NOACs    HTN:  - continue Norvasc/Hydralazine/Cardura     66M with h/o DM type II, HTN, CAD s/p CABG in 2020, Afib on Eliquis, CVA in 2019 due to Afib, Seizure d/o (last episode was on 4/8/22), h/o GI bleed in 2/2022 EGD with duodenal ulcer and ESRD on HD s/p R DDRT from [facility] donor on 4/21/22 complicated by DGF requiring HD until 4/29/22 now with good graft function who presented with status epilepticus in setting of UTI/antibiotics and sub-therapeutic valproic acid level     Seizure Disorder:  - Repeat MRI head 6/27 with less concerns for PRES, likely ischemic changes  - Remains seizure free  - Antiepileptic regimen per Neurology, currently on fosphenytoin 50mg AM/100mg PM. Will f/u Neuro recs and discuss PO options once tolerates PO  - Keep Mag > 2  - Infectious w/u negative so far, has been off ABX.      R DCD DDRT (ureter stented) 4/21/22:  - Excellent renal function  - Will plan for removal of ureteral stent today   - Strict I/Os, Continue Doxazosin/Proscar. Monitor UOP  - Continue TFs via NGT, passed bedside S&S in SICU, formal S&S ordered  - No PEG at this time per family  - OOB with RN/PT    Hyperkalemia:   - resolved. Continue to monitor    Immunosuppression:   - Belatacept: 6/23, 7/4 (initial loading).  Re-loaded 7/8 as there was a gap between doses 2/2 seizures. next dose 7/18  - MMF 1g BID  - continue Pred 5  - PPx:  Mepron/Valcyte/PPI    DM  - on Lantus/Lispro, Endocrine following     AFib:  - continue Retacrit weekly  - Eliquis with ~40% less efficacy while on fosphenytoin.  Switched back to Lovenox 60BID. Will discuss plan for anticoagulation with Neurology and family  - rate controlled    R IJ DVT  - Continue Lovenox     HTN:  - continue Norvasc/Hydralazine/Cardura

## 2022-07-12 NOTE — PROGRESS NOTE ADULT - SUBJECTIVE AND OBJECTIVE BOX
[facility]   MD NINA MASON MD, DO SADIE RAMIREZ NP    TEL:  OFFICE: [phone]    From 5pm-7am Answering Service [phone]    -- RENAL FOLLOW UP NOTE ---Date of Service 07-12-22 @ 13:25    Patient is a 66y old  Male who presents with a chief complaint of Status Epilepticus (12 Jul 2022 11:34)      Patient seen and examined at bedside. No chest pain/sob    VITALS:  T(F): 98 (07-12-22 @ 12:00), Max: 99 (07-11-22 @ 16:59)  HR: 57 (07-12-22 @ 12:30)  BP: 151/68 (07-12-22 @ 12:30)  RR: 16 (07-12-22 @ 12:30)  SpO2: 95% (07-12-22 @ 12:30)  Wt(kg): --    07-11 @ 07:01  -  07-12 @ 07:00  --------------------------------------------------------  IN: 900 mL / OUT: 2375 mL / NET: -1475 mL    07-12 @ 07:01  -  07-12 @ 13:25  --------------------------------------------------------  IN: 200 mL / OUT: 125 mL / NET: 75 mL      Height (cm): 177.8 (07-12 @ 09:41)  Weight (kg): 61.7 (07-12 @ 09:41)  BMI (kg/m2): 19.5 (07-12 @ 09:41)  BSA (m2): 1.77 (07-12 @ 09:41)    PHYSICAL EXAM:  Constitutional: NAD  Neck: No JVD  Respiratory: CTAB, no wheezes, rales or rhonchi  Cardiovascular: S1, S2, RRR  Gastrointestinal: BS+, soft, NT/ND  Extremities: + peripheral edema    Hospital Medications:   MEDICATIONS  (STANDING):  amLODIPine   Tablet 10 milliGRAM(s) Oral <User Schedule>  atorvastatin 40 milliGRAM(s) Oral at bedtime  atovaquone  Suspension 1500 milliGRAM(s) Oral <User Schedule>  chlorhexidine 2% Cloths 1 Application(s) Topical <User Schedule>  doxazosin 4 milliGRAM(s) Oral daily  enoxaparin Injectable 60 milliGRAM(s) SubCutaneous every 12 hours  epoetin cortney-epbx (RETACRIT) Injectable 33615 Unit(s) SubCutaneous every 7 days  finasteride 5 milliGRAM(s) Oral daily  fosphenytoin IVPB 50 milliGRAM(s) PE IV Intermittent <User Schedule>  fosphenytoin IVPB 100 milliGRAM(s) PE IV Intermittent <User Schedule>  hydrALAZINE 100 milliGRAM(s) Oral every 8 hours  insulin lispro (ADMELOG) corrective regimen sliding scale   SubCutaneous every 6 hours  lactated ringers. 1000 milliLiter(s) (75 mL/Hr) IV Continuous <Continuous>  levothyroxine Injectable 40 MICROGram(s) IV Push at bedtime  melatonin 6 milliGRAM(s) Oral at bedtime  mycophenolate mofetil Suspension 1000 milliGRAM(s) Enteral Tube <User Schedule>  pantoprazole   Suspension 40 milliGRAM(s) Oral daily  polyethylene glycol 3350 17 Gram(s) Oral daily  predniSONE  Solution 5 milliGRAM(s) Oral daily  valGANciclovir 50 mG/mL Oral Solution 450 milliGRAM(s) Oral daily  warfarin 2 milliGRAM(s) Oral once      LABS:  07-12    138  |  104  |  23  ----------------------------<  106<H>  4.0   |  24  |  1.16    Ca    8.6      12 Jul 2022 05:53  Phos  2.6     07-12  Mg     1.8     07-12    TPro  5.8<L>  /  Alb  2.3<L>  /  TBili  0.2  /  DBili      /  AST  19  /  ALT  12  /  AlkPhos  138<H>  07-12    Creatinine Trend: 1.16 <--, 1.15 <--, 1.16 <--, 1.17 <--, 1.25 <--, 1.25 <--, 1.34 <--, 1.45 <--, 1.46 <--, 1.56 <--, 1.55 <--, 1.53 <--, 1.61 <--, 1.71 <--, 1.69 <--, 1.66 <--, 1.67 <--    Albumin, Serum: 2.3 g/dL (07-12 @ 05:53)  Phosphorus Level, Serum: 2.6 mg/dL (07-12 @ 05:53)                              9.1    4.52  )-----------( 298      ( 12 Jul 2022 05:54 )             28.7     Urine Studies:  Urinalysis - [07-03-22 @ 14:32]      Color Light Yellow / Appearance Clear / SG 1.018 / pH 8.0      Gluc Negative / Ketone Negative  / Bili Negative / Urobili Negative       Blood Negative / Protein Trace / Leuk Est Negative / Nitrite Negative      RBC 1 / WBC 2 / Hyaline 0 / Gran  / Sq Epi  / Non Sq Epi 1 / Bacteria Negative      Iron 17, TIBC 171, %sat 10      [05-02-22 @ 13:03]  Ferritin 1505      [05-02-22 @ 13:05]  PTH -- (Ca 9.2)      [02-05-22 @ 10:13]   294  HbA1c 6.0      [02-21-20 @ 08:53]  TSH 4.69      [07-06-22 @ 18:36]      Syphilis Screen (Treponema Pallidum Ab) Negative      [06-27-22 @ 02:00]    RADIOLOGY & ADDITIONAL STUDIES:

## 2022-07-12 NOTE — SWALLOW BEDSIDE ASSESSMENT ADULT - COMMENTS
Swallow Hx:   MBS 08/19/2020: Soft texture diet. MD/team Please enter the following as provider to RN orders : 1) Supervision with meals, 2) Crush meds or provide via single CUP sips thin liquids 3) ALL liquids via CUP. NO STRAW. NO SPOON. 4) Provide small single bites and sips at slow rate 5) SWALLOW TWO TIMES PER BITE AND SIP 6) NO MIXED CONSISTENCIES 7) Aspiration precautions. Monitor for s/s aspiration/laryngeal penetration. If noted: D/C p.o. intake, provide non-oral nutrition/hydration/meds. Hospital course:   6/10: transferred from rehab facility for seizure status epilepticus. Intubated for respiratory protection and transferred to MICU.  EEG showed no seizure activity. Neuro consulted.   6/11: Extubated. Found to have R pleural effusion. s/p Thoracentesis 1.3L. Eliquis changed to heparin drip.  Post procedure drop in H&H. 5u PRBC, 2 u FFP.    6/11 evening: Transferred to SICU for further care in setting of hypoxia, significant R pleural effusion, hgb 6 and hemodynamic instability  6/11 Intubated at 9pm and sedated on Precedex.  CT placed, 600ml blood drained.  1L total overnight, started pressors  6/11 Received Protamine for PTT >100 (was on Heparin drip started for AF), 2 u FFP and 5u PRBC total  6/13 s/p VATS 2.2L old blood removed; second chest tube placed  6/18-19: chest tubes removed  6/21: ?PRES on MRI, off Envarsus, switched to Belatacept 6/23 6/25: Tx to SICU for refractory seizures  7/10: Passed bedside S&S in SICU. Downgraded from SICU to floor.  Swallow Hx:   MBS 08/19/2020: Soft texture diet. MD/team Please enter the following as provider to RN orders : 1) Supervision with meals, 2) Crush meds or provide via single CUP sips thin liquids 3) ALL liquids via CUP. NO STRAW. NO SPOON. 4) Provide small single bites and sips at slow rate 5) SWALLOW TWO TIMES PER BITE AND SIP 6) NO MIXED CONSISTENCIES 7) Aspiration precautions. Monitor for s/s aspiration/laryngeal penetration. If noted: D/C p.o. intake, provide non-oral nutrition/hydration/meds.

## 2022-07-12 NOTE — PROGRESS NOTE ADULT - ASSESSMENT
67yo man with PMH of CVA (2019) with subsequent seizure disorder, ESRD s/p renal transplant (04/2022), afib (on apixaban), CAD (s/p stents), CABG (2020), HTN, HLD, DM presents to the ED with status epilepticus from [facility]. Semiology includes eyes deviated to the R, rhythmic R facial twitching as well as R shoulder clonic movements lasting approximately 30 seconds each. EEG from 04/2022 showed L frontocentral focal onset seizures. S/p intubation on 6/11. EEG negative for seizure but showed structural or functional abnormality in the left fronto-temporal region and moderate to severe nonspecific diffuse or multifocal cerebral dysfunction. RRT called 6/21 for decreased movement of RUE but with increased tone and tremor like activity/ clonus and diffuse increased tone and increased encephalopathy concerning for seizure. Course then complicated by EEG 6/24 showing L frontotemporal/ frontal seizures. Was on keppra, valproic acid, vimpat but had seizures through the prior two medications. Stopped vimpat, valproic acid, standing ativan, and fycompa. Now only on fosphenytoin.    Last EEG 7/7:  Infrequent left posterior sharp discharges, Left frontotemporal slowing  Corrected phenytoin level: measured level 16.1, corrected 22.0 (7/11)    Impression: History of recent status epilepticus that was refractory, now on fosphenytoin. Waxing/waning mental status possibly from hospital-induced delirium with toxic-metabolic encephalopathy.     Recommendations:  [] Continue fosphenytoin 50mg in AM and 100mg in PM; pending S&S evaluation prior to transition to PO  [] Recheck phenytoin level in 1-2 days with CMP, trough dose (30 mins prior to AM dose)  [] monitor clinically for seizures  [] For stroke standpoint, should restart anticoagulation for secondary stroke prevention given afib hx if not otherwise contraindicated (given potential interaction with eliquis and phenytoin, agree with either lovenox or coumadin for anticoagulation), continue discussion with family for anticoagulation decision  [x] CSF without evidence of infection, inflammation  [x] Vitamin B12 1157, folate >20, homocysteine 13.4, Vitamin B1 160  [] f/u methylmalonic acid  [x] TSH 4.6, T3 3.9/T4 43 - correction of hypothyroidism per primary team and endocrinology  [x] BP goals of normotension   [x] MR brain without clear evidence of infection, inflammation, or acute CNS event (possibly minimal enhancement). Although prior study read as possible PRES, most recent MRI seems more consistent with significant chronic ischemic microvascular disease given no associated DWI changes or obvious enhancement  [x] Telemetry monitoring  [x] Neuro checks and vital signs per unit protocol  [x] Seizure/fall/aspiration precautions  [] please have adequate sleeping environment for the patient, light on, curtains open, TV on during the day with regular stimulation and out of bed to chair if possible. During the night, close curtains, TV off, lights off, and minimize interruptions (limit blood draws and vital sign checks if possible). Regular interaction with patient with staff (introducing selves), frequent reorientation, and encouragement of visitors as allowed by hospital and unit policy. Regular toileting.  [] if needed for sleep may administer melatonin 3mg  [] limitation of sedating medications as tolerated  [] maintain electrolytes within normal limits  [/] Advise against driving, operating heavy machinery, water sports/bathing, activity in elevation without appropriate safety precautions  [ ] outpatient EMG/ NCS       Patient seen and discussed with neurology attending, Dr. Maria. Discussed with epilepsy attending, Dr. Allred.

## 2022-07-12 NOTE — PROGRESS NOTE ADULT - NUTRITIONAL ASSESSMENT
This patient has been assessed with a concern for Malnutrition and has been determined to have a diagnosis/diagnoses of Severe protein-calorie malnutrition.    This patient is being managed with:   Diet NPO after Midnight-     NPO Start Date: 11-Jul-2022   NPO Start Time: 23:59  Entered: Jul 11 2022  9:45AM    Diet NPO with Tube Feed-  Tube Feeding Modality: Nasogastric  Nepro with Carb Steady (NEPRORTH)  Total Volume for 24 Hours (mL): 960  Continuous  Starting Tube Feed Rate {mL per Hour}: 20  Increase Tube Feed Rate by (mL): 10     Every 4 hours  Until Goal Tube Feed Rate (mL per Hour): 40  Tube Feed Duration (in Hours): 24  Tube Feed Start Time: 07:00  Entered: Jul 6 2022 11:20AM

## 2022-07-12 NOTE — PROGRESS NOTE ADULT - ATTENDING COMMENTS
Stent removal today. Graft function stable.   On Lovenox instead of eliquis for a.fib due to drug interaction with fosphenytoin.   Patient's son comfortable with lovenox.  Belatacept, MMF and pred for IS

## 2022-07-12 NOTE — PROVIDER CONTACT NOTE (OTHER) - ASSESSMENT
patient remains hypertensive. BP is higher now than earlier upon reassessment as per instruction from team. does team want to order any intervention at this time?

## 2022-07-12 NOTE — PROGRESS NOTE ADULT - SUBJECTIVE AND OBJECTIVE BOX
SUBJECTIVE/INTERVAL HISTORY: Pt seen and examined at bedside with attending and neurology team. Pt smiling and reports no issues. Pt s/p ureteral stent removal today.    PAST MEDICAL & SURGICAL HISTORY:  Hypertension  Diabetes  Dyslipidemia  CAD (Coronary Artery Disease)  with Stents in 2009 and 2019, s/p off-pump C3L on 20  Hypothyroidism  CVA (cerebral vascular accident)  19 with residual bilateral weakness  Anemia  PEG (percutaneous endoscopic gastrostomy) status  removed 2020  Intubation of airway performed without difficulty  Dec 2019  CVA c/b status epilepticus requiring intubation and PEG in 2019-  ESRD on dialysis  M/W/F  Seizures  after CVA, last seizure was last week 22  History of insertion of stent into coronary artery bypass graft   and   S/P CABG x 3  off pump C3L on 20    FAMILY HISTORY:  Family history of cancer of tongue (Father)  Parents are  . Father - at 80 years, tongue cancer was a smoker. Mother- at 71, had accident while crossing road. Siblings- 2 brothers and one sister.      MEDICATIONS (HOME):  Home Medications:  amLODIPine 10 mg oral tablet: 1 tab(s) enteral once a day (2022 15:45)  apixaban 2.5 mg oral tablet: 1 tab(s) orally 2 times a day (2022 15:45)  atorvastatin 40 mg oral tablet: 1 tab(s) orally once a day (at bedtime) (2022 15:45)  CellCept 500 mg oral tablet: 1 tab(s) orally 2 times a day (2022 15:45)  Coreg 6.25 mg oral tablet: 1 tab(s) orally 2 times a day (2022 15:45)  divalproex sodium 250 mg oral delayed release tablet: 1 tab(s) orally once a day (2022 15:45)  divalproex sodium 500 mg oral delayed release tablet: 1 tab(s) orally 2 times a day (2022 15:45)  Envarsus XR 1 mg oral tablet, extended release: 3 tab(s) by gastrostomy tube once a day (in the morning) (2022 15:45)  fluconazole 200 mg oral tablet: 1 tab(s) orally once a day (2022 15:45)  HumaLOG 100 units/mL injectable solution: 5 unit(s) injectable 3 times a day (2022 15:45)  insulin glargine 100 units/mL subcutaneous solution: 6 unit(s) subcutaneous once a day (at bedtime) (2022 14:27)  levETIRAcetam 250 mg oral tablet: 1 tab(s) orally 2 times a day (2022 15:45)  levothyroxine 50 mcg (0.05 mg) oral tablet: 1 tab(s) orally once a day (2022 15:45)  magnesium oxide 400 mg oral tablet: 1 tab(s) orally 3 times a day (with meals) (2022 15:45)  Multiple Vitamins oral capsule: 1 cap(s) orally once a day (2022 15:45)  pantoprazole 40 mg oral delayed release tablet: 1 tab(s) orally once a day (before a meal) (2022 15:45)  predniSONE: 5 milligram(s) orally once a day (2022 15:45)  senna oral tablet: 2 tab(s) orally once a day (at bedtime) (2022 15:45)  sulfamethoxazole-trimethoprim 400 mg-80 mg oral tablet: 1 tab(s) orally once a day (2022 15:45)  valGANciclovir 450 mg oral tablet: 1 tab(s) orally 3 times a week (2022 15:45)    MEDICATIONS  (STANDING):  amLODIPine   Tablet 10 milliGRAM(s) Oral <User Schedule>  atorvastatin 40 milliGRAM(s) Oral at bedtime  atovaquone  Suspension 1500 milliGRAM(s) Oral <User Schedule>  chlorhexidine 2% Cloths 1 Application(s) Topical <User Schedule>  doxazosin 4 milliGRAM(s) Oral daily  enoxaparin Injectable 60 milliGRAM(s) SubCutaneous every 12 hours  epoetin cortney-epbx (RETACRIT) Injectable 22940 Unit(s) SubCutaneous every 7 days  finasteride 5 milliGRAM(s) Oral daily  fosphenytoin IVPB 50 milliGRAM(s) PE IV Intermittent <User Schedule>  fosphenytoin IVPB 100 milliGRAM(s) PE IV Intermittent <User Schedule>  hydrALAZINE 100 milliGRAM(s) Oral every 8 hours  insulin lispro (ADMELOG) corrective regimen sliding scale   SubCutaneous every 6 hours  levothyroxine Injectable 40 MICROGram(s) IV Push at bedtime  melatonin 6 milliGRAM(s) Oral at bedtime  mycophenolate mofetil Suspension 1000 milliGRAM(s) Enteral Tube <User Schedule>  pantoprazole   Suspension 40 milliGRAM(s) Oral daily  polyethylene glycol 3350 17 Gram(s) Oral daily  predniSONE  Solution 5 milliGRAM(s) Oral daily  valGANciclovir 50 mG/mL Oral Solution 450 milliGRAM(s) Oral daily    MEDICATIONS  (PRN):  acetaminophen    Suspension .. 650 milliGRAM(s) Enteral Tube every 6 hours PRN Mild Pain (1 - 3)    ALLERGIES/INTOLERANCES:  Allergies  No Known Allergies    Intolerances    VITALS & EXAMINATION:  Vital Signs Last 24 Hrs  T(C): 36.4 (2022 13:50), Max: 37.2 (2022 16:59)  T(F): 97.6 (2022 13:50), Max: 99 (2022 16:59)  HR: 62 (2022 13:50) (55 - 69)  BP: 157/63 (2022 13:50) (139/64 - 176/78)  BP(mean): 88 (2022 12:30) (68 - 112)  RR: 20 (2022 13:50) (16 - 20)  SpO2: 99% (2022 13:50) (95% - 100%)    Parameters below as of 2022 13:50  Patient On (Oxygen Delivery Method): room air    General appearance: does not appear to be in pain  Head: normocephalic  Eyes: clear sclera  Extremities: no ezio LE edema  Respiratory: breathing regularly    Focused neurologic exam:  MS - awake, alert, oriented to person, place ([place]), not date, but perseverates and only follows simple commands rarely. Attend to stimuli from both sides.   CN - pupils equal round, EOMI, BTT b/l, no apparent facial asymmetry, hearing intact to conversation b/l   Motor - Normal bulk. Inc tone in L side and normal tone in R side. Spontaneous movements of L > R side.    Sens - LT/temp intact all, as above  Coord - PERRY  Gait and station - PERRY      LABORATORY:  CBC                       9.1    4.52  )-----------( 298      ( 2022 05:54 )             28.7     Chem 12    138  |  104  |  23  ----------------------------<  106<H>  4.0   |  24  |  1.16    Ca    8.6      2022 05:53  Phos  2.6     07-12  Mg     1.8     -12    TPro  5.8<L>  /  Alb  2.3<L>  /  TBili  0.2  /  DBili  x   /  AST  19  /  ALT  12  /  AlkPhos  138<H>  07-12    LFTs LIVER FUNCTIONS - ( 2022 05:53 )  Alb: 2.3 g/dL / Pro: 5.8 g/dL / ALK PHOS: 138 U/L / ALT: 12 U/L / AST: 19 U/L / GGT: x           Coagulopathy PT/INR - ( 2022 05:54 )   PT: 13.0 sec;   INR: 1.12 ratio         PTT - ( 2022 05:54 )  PTT:39.7 sec  Lipid Panel   A1c   Cardiac enzymes CARDIAC MARKERS ( 2022 00:19 )  x     / x     / x     / x     / 2.2 ng/mL  CARDIAC MARKERS ( 10 Jul 2022 19:12 )  x     / x     / x     / x     / 2.5 ng/mL    STUDIES & IMAGING:  Studies (EKG, EEG, EMG, etc):      < from: MR Head w/wo IV Cont (22 @ 15:23) >    ACC: [id number] EXAM:  MR BRAIN [facility] IC                          PROCEDURE DATE:  2022      INTERPRETATION:  CLINICAL INDICATION: Altered mental status, seizures    Magnetic resonance imaging of the brain was carried out with transaxial   SPGR, FLAIR, fast spin echo T2 weighted images, axial susceptibility   weighted series, diffusion weighted series and sagittal T1 weighted   series on a 1.5 Anisa magnet. Post contrast axial, coronal and sagittal   T1 weighted images were obtained. 6 cc of Gadavist were intravenously   injected, 1.5 cc were discarded.    Comparison is made with the prior brain CT of 2022.    Mild to moderate atrophy is identified with ventricular and sulcal   prominence. Extensive small vessel white matter ischemic changes are   noted. There is extensive bifrontal parietal encephalomalacia and gliosis   involving the centrum semiovale ovale in the border zone regions. These   do not demonstrate diffusion restriction and demonstrate bright signal   intensity on T1-weighted images which does not lymphoma on susceptibility   weighted series suggesting laminar necrosis rather than old hemorrhage.   After contrast administration there is mild gyral enhancement in a small   portion of the frontal cortex. There is no evidence of cerebritis or   abscess.    IMPRESSION: Extensive bilateral frontal parietal gliosis and   encephalomalacia with minimal enhancement which may be related to   ischemic changes versus changes of hypertensive encephalopathy in a   patient with renal transplant on immunosuppressants. No evidence of   cerebritis or abscess.    --- End of Report ---    TIMOTHY GOMES MD; Attending Radiologist  This document has been electronically signed. 2022  4:03PM    < end of copied text >    vEEG   EEG SUMMARY/CLASSIFICATION    Abnormal EEG in an altered / a sedated patient.  - Rare right frontal and left frontal sharp waves  - Patient events as described above  - Left frontotemporal slowing  - Mild to moderate generalized slowing    _____________________________________________________________  EEG IMPRESSION/CLINICAL CORRELATE    Abnormal EEG study.  Event of left leg shaking was not epileptic.  Potential epileptogenic foci in the right frontal and left frontal regions.  Structural or functional abnormality in the left frontotemporal region.  Mild to moderate nonspecific diffuse or multifocal cerebral dysfunction.   No seizure seen.    EEG   EEG SUMMARY/CLASSIFICATION    Abnormal EEG in an altered / a sedated patient.  - Infrequent left posterior sharp discharges  - Left frontotemporal slowing  - Mild to moderate generalized slowing    _____________________________________________________________  EEG IMPRESSION/CLINICAL CORRELATE  Structural or functional abnormality in the left frontotemporal region.  Mild to moderate nonspecific diffuse or multifocal cerebral dysfunction.   Findings indicate risk for focal seizures from the left posterior region.     EEG    EEG SUMMARY/CLASSIFICATION    Abnormal EEG in an altered / a sedated patient.  - Rare right frontal and left frontal sharp waves  - Patient events as described above  - Left frontotemporal slowing  - Mild to moderate generalized slowing    _____________________________________________________________  EEG IMPRESSION/CLINICAL CORRELATE    Abnormal EEG study.  Event of left leg shaking was not epileptic.  Potential epileptogenic foci in the right frontal and left frontal regions.  Structural or functional abnormality in the left frontotemporal region.  Mild to moderate nonspecific diffuse or multifocal cerebral dysfunction.   No seizure seen.       SUBJECTIVE/INTERVAL HISTORY: Pt seen and examined at bedside with attending and neurology team. Pt smiling and reports no issues. Pt s/p ureteral stent removal today.    ROS: All systems negative except for documented in Subjective/Interval History    PAST MEDICAL & SURGICAL HISTORY:  Hypertension  Diabetes  Dyslipidemia  CAD (Coronary Artery Disease)  with Stents in 2009 and 2019, s/p off-pump C3L on 20  Hypothyroidism  CVA (cerebral vascular accident)  19 with residual bilateral weakness  Anemia  PEG (percutaneous endoscopic gastrostomy) status  removed 2020  Intubation of airway performed without difficulty  Dec 2019  CVA c/b status epilepticus requiring intubation and PEG in 2019-  ESRD on dialysis  M/W/F  Seizures  after CVA, last seizure was last week 22  History of insertion of stent into coronary artery bypass graft   and   S/P CABG x 3  off pump C3L on 20    FAMILY HISTORY:  Family history of cancer of tongue (Father)  Parents are  . Father - at 80 years, tongue cancer was a smoker. Mother- at 71, had accident while crossing road. Siblings- 2 brothers and one sister.      MEDICATIONS (HOME):  Home Medications:  amLODIPine 10 mg oral tablet: 1 tab(s) enteral once a day (2022 15:45)  apixaban 2.5 mg oral tablet: 1 tab(s) orally 2 times a day (2022 15:45)  atorvastatin 40 mg oral tablet: 1 tab(s) orally once a day (at bedtime) (2022 15:45)  CellCept 500 mg oral tablet: 1 tab(s) orally 2 times a day (2022 15:45)  Coreg 6.25 mg oral tablet: 1 tab(s) orally 2 times a day (2022 15:45)  divalproex sodium 250 mg oral delayed release tablet: 1 tab(s) orally once a day (2022 15:45)  divalproex sodium 500 mg oral delayed release tablet: 1 tab(s) orally 2 times a day (2022 15:45)  Envarsus XR 1 mg oral tablet, extended release: 3 tab(s) by gastrostomy tube once a day (in the morning) (2022 15:45)  fluconazole 200 mg oral tablet: 1 tab(s) orally once a day (2022 15:45)  HumaLOG 100 units/mL injectable solution: 5 unit(s) injectable 3 times a day (2022 15:45)  insulin glargine 100 units/mL subcutaneous solution: 6 unit(s) subcutaneous once a day (at bedtime) (2022 14:27)  levETIRAcetam 250 mg oral tablet: 1 tab(s) orally 2 times a day (2022 15:45)  levothyroxine 50 mcg (0.05 mg) oral tablet: 1 tab(s) orally once a day (2022 15:45)  magnesium oxide 400 mg oral tablet: 1 tab(s) orally 3 times a day (with meals) (2022 15:45)  Multiple Vitamins oral capsule: 1 cap(s) orally once a day (2022 15:45)  pantoprazole 40 mg oral delayed release tablet: 1 tab(s) orally once a day (before a meal) (2022 15:45)  predniSONE: 5 milligram(s) orally once a day (2022 15:45)  senna oral tablet: 2 tab(s) orally once a day (at bedtime) (2022 15:45)  sulfamethoxazole-trimethoprim 400 mg-80 mg oral tablet: 1 tab(s) orally once a day (2022 15:45)  valGANciclovir 450 mg oral tablet: 1 tab(s) orally 3 times a week (2022 15:45)    MEDICATIONS  (STANDING):  amLODIPine   Tablet 10 milliGRAM(s) Oral <User Schedule>  atorvastatin 40 milliGRAM(s) Oral at bedtime  atovaquone  Suspension 1500 milliGRAM(s) Oral <User Schedule>  chlorhexidine 2% Cloths 1 Application(s) Topical <User Schedule>  doxazosin 4 milliGRAM(s) Oral daily  enoxaparin Injectable 60 milliGRAM(s) SubCutaneous every 12 hours  epoetin cortney-epbx (RETACRIT) Injectable 95359 Unit(s) SubCutaneous every 7 days  finasteride 5 milliGRAM(s) Oral daily  fosphenytoin IVPB 50 milliGRAM(s) PE IV Intermittent <User Schedule>  fosphenytoin IVPB 100 milliGRAM(s) PE IV Intermittent <User Schedule>  hydrALAZINE 100 milliGRAM(s) Oral every 8 hours  insulin lispro (ADMELOG) corrective regimen sliding scale   SubCutaneous every 6 hours  levothyroxine Injectable 40 MICROGram(s) IV Push at bedtime  melatonin 6 milliGRAM(s) Oral at bedtime  mycophenolate mofetil Suspension 1000 milliGRAM(s) Enteral Tube <User Schedule>  pantoprazole   Suspension 40 milliGRAM(s) Oral daily  polyethylene glycol 3350 17 Gram(s) Oral daily  predniSONE  Solution 5 milliGRAM(s) Oral daily  valGANciclovir 50 mG/mL Oral Solution 450 milliGRAM(s) Oral daily    MEDICATIONS  (PRN):  acetaminophen    Suspension .. 650 milliGRAM(s) Enteral Tube every 6 hours PRN Mild Pain (1 - 3)    ALLERGIES/INTOLERANCES:  Allergies  No Known Allergies    Intolerances    VITALS & EXAMINATION:  Vital Signs Last 24 Hrs  T(C): 36.4 (2022 13:50), Max: 37.2 (2022 16:59)  T(F): 97.6 (2022 13:50), Max: 99 (2022 16:59)  HR: 62 (2022 13:50) (55 - 69)  BP: 157/63 (2022 13:50) (139/64 - 176/78)  BP(mean): 88 (2022 12:30) (68 - 112)  RR: 20 (2022 13:50) (16 - 20)  SpO2: 99% (2022 13:50) (95% - 100%)    Parameters below as of 2022 13:50  Patient On (Oxygen Delivery Method): room air    General appearance: does not appear to be in pain  Head: normocephalic  Eyes: clear sclera  Extremities: no ezio LE edema  Respiratory: breathing regularly    Focused neurologic exam:  MS - awake, alert, oriented to person, place ([place]), not date, but perseverates and only follows simple commands rarely. Attend to stimuli from both sides.   CN - pupils equal round, EOMI, BTT b/l, no apparent facial asymmetry, hearing intact to conversation b/l   Motor - Normal bulk. Inc tone in L side and normal tone in R side. Spontaneous movements of L > R side.    Sens - LT/temp intact all, as above  Coord - PERRY  Gait and station - PERRY      LABORATORY:  CBC                       9.1    4.52  )-----------( 298      ( 2022 05:54 )             28.7     Chem     138  |  104  |  23  ----------------------------<  106<H>  4.0   |  24  |  1.16    Ca    8.6      2022 05:53  Phos  2.6       Mg     1.8         TPro  5.8<L>  /  Alb  2.3<L>  /  TBili  0.2  /  DBili  x   /  AST  19  /  ALT  12  /  AlkPhos  138<H>  07-12    LFTs LIVER FUNCTIONS - ( 2022 05:53 )  Alb: 2.3 g/dL / Pro: 5.8 g/dL / ALK PHOS: 138 U/L / ALT: 12 U/L / AST: 19 U/L / GGT: x           Coagulopathy PT/INR - ( 2022 05:54 )   PT: 13.0 sec;   INR: 1.12 ratio         PTT - ( 2022 05:54 )  PTT:39.7 sec  Lipid Panel   A1c   Cardiac enzymes CARDIAC MARKERS ( 2022 00:19 )  x     / x     / x     / x     / 2.2 ng/mL  CARDIAC MARKERS ( 10 Jul 2022 19:12 )  x     / x     / x     / x     / 2.5 ng/mL    STUDIES & IMAGING:  Studies (EKG, EEG, EMG, etc):      < from: MR Head w/wo IV Cont (22 @ 15:23) >    ACC: [id number] EXAM:  MR BRAIN [facility] IC                          PROCEDURE DATE:  2022      INTERPRETATION:  CLINICAL INDICATION: Altered mental status, seizures    Magnetic resonance imaging of the brain was carried out with transaxial   SPGR, FLAIR, fast spin echo T2 weighted images, axial susceptibility   weighted series, diffusion weighted series and sagittal T1 weighted   series on a 1.5 Anisa magnet. Post contrast axial, coronal and sagittal   T1 weighted images were obtained. 6 cc of Gadavist were intravenously   injected, 1.5 cc were discarded.    Comparison is made with the prior brain CT of 2022.    Mild to moderate atrophy is identified with ventricular and sulcal   prominence. Extensive small vessel white matter ischemic changes are   noted. There is extensive bifrontal parietal encephalomalacia and gliosis   involving the centrum semiovale ovale in the border zone regions. These   do not demonstrate diffusion restriction and demonstrate bright signal   intensity on T1-weighted images which does not lymphoma on susceptibility   weighted series suggesting laminar necrosis rather than old hemorrhage.   After contrast administration there is mild gyral enhancement in a small   portion of the frontal cortex. There is no evidence of cerebritis or   abscess.    IMPRESSION: Extensive bilateral frontal parietal gliosis and   encephalomalacia with minimal enhancement which may be related to   ischemic changes versus changes of hypertensive encephalopathy in a   patient with renal transplant on immunosuppressants. No evidence of   cerebritis or abscess.    --- End of Report ---    TIMOTHY GOMES MD; Attending Radiologist  This document has been electronically signed. 2022  4:03PM    < end of copied text >    vEEG   EEG SUMMARY/CLASSIFICATION    Abnormal EEG in an altered / a sedated patient.  - Rare right frontal and left frontal sharp waves  - Patient events as described above  - Left frontotemporal slowing  - Mild to moderate generalized slowing    _____________________________________________________________  EEG IMPRESSION/CLINICAL CORRELATE    Abnormal EEG study.  Event of left leg shaking was not epileptic.  Potential epileptogenic foci in the right frontal and left frontal regions.  Structural or functional abnormality in the left frontotemporal region.  Mild to moderate nonspecific diffuse or multifocal cerebral dysfunction.   No seizure seen.    EEG   EEG SUMMARY/CLASSIFICATION    Abnormal EEG in an altered / a sedated patient.  - Infrequent left posterior sharp discharges  - Left frontotemporal slowing  - Mild to moderate generalized slowing    _____________________________________________________________  EEG IMPRESSION/CLINICAL CORRELATE  Structural or functional abnormality in the left frontotemporal region.  Mild to moderate nonspecific diffuse or multifocal cerebral dysfunction.   Findings indicate risk for focal seizures from the left posterior region.     EEG    EEG SUMMARY/CLASSIFICATION    Abnormal EEG in an altered / a sedated patient.  - Rare right frontal and left frontal sharp waves  - Patient events as described above  - Left frontotemporal slowing  - Mild to moderate generalized slowing    _____________________________________________________________  EEG IMPRESSION/CLINICAL CORRELATE    Abnormal EEG study.  Event of left leg shaking was not epileptic.  Potential epileptogenic foci in the right frontal and left frontal regions.  Structural or functional abnormality in the left frontotemporal region.  Mild to moderate nonspecific diffuse or multifocal cerebral dysfunction.   No seizure seen.

## 2022-07-12 NOTE — PROGRESS NOTE ADULT - PROBLEM SELECTOR PLAN 1
-R DCD DDRT (ureter stented) 4/21/22  -Episode of ANA inhouse with peak SCr 2.9; now stable at 1.1 today. Allograft function stable with good UOP  - stent removal today  -S & S eval today    Status epilepticus- Resolved with encephalopathy initially from post ictal state, now possible ICU delirium. LP -ve. vEEG -ve. Now controlled on fosphenytoin. Neuro following. Given interaction b/n Eliquis and fosphenytoin will change to coumadin today as discussed with neuro    Hemothorax- resolved. s/p VATS. Now on RA    RIJ thrombus & Afib, swtich from lovenox to coumadin due to above    HTN: Hydralazine 100mg q8, Amlodipine 10mg po daily, Doxazosin 4mg     Anemia - stable, Continue epo 10,000 weekly    BPH- passed TOV. c/w proscar & doxazosin

## 2022-07-12 NOTE — PROVIDER CONTACT NOTE (OTHER) - ASSESSMENT
patient hypertensive. patient denies pain or discomfort at this time. does not have any medications for blood pressure at this time. all other vitals WDL.

## 2022-07-12 NOTE — PROVIDER CONTACT NOTE (OTHER) - ASSESSMENT
patient NPO for stent removal today. Tube feeds held since midnight, however, patient still has medications due via NG tube administration. Current diet order is full NPO, is it okay to push medications?

## 2022-07-12 NOTE — SWALLOW BEDSIDE ASSESSMENT ADULT - H & P REVIEW
yes 66 yr old Male with PMHx ESRD s/p permacath removal 5/10/22 s/p Rt DDRT 4/21/22,  CVA (2019), Afib on apixaban, seizure activity, CAD s/p stents, CABG (2020), HTN, HLD, DM on insulin, gastric/duodenal ulcer, recent hospitalization 4/28/22 -5/10/22 with weakness/anemia found to have perinephric hematoma requiring evacuation and repair of bleeding arterial anastomosis. Curently being treated for UTI with Levaquin Today after developing multiple sz episodes refractory to 5 mg versed IM (EMS) and 2 mg ativan IV in E.D. concerning for status epilepticus requiring intubation for hypoxic respiratory  failure. MICU Consult was called for hypoxic respiratory failure secondary to status epilepticus. In the ED VS temp 97.8 Axillary, Hrt rate 61 RR26 100% on NRB mask. Per ED noted the patient was noted to be gurgling and was unable to protect airway, and was intubated. The patient has now been accepted to the MICU for further management./yes

## 2022-07-12 NOTE — PROGRESS NOTE ADULT - PROBLEM SELECTOR PLAN 2
-Switched from CNI to Belatacept due to neurotoxicity (concern for PRES & seizures)  -Belatacept on 6/23 & 7/8, next dose 7/18  -Increase MMF 1g BID  -continue Pred 5  -PPx:  continue PPI, Atovaquone (discontinued bactrim due to hyperkalemia), continue Valcyte 450mg po daily (CMV intermediate risk) moist

## 2022-07-12 NOTE — PROGRESS NOTE ADULT - PROBLEM SELECTOR PLAN 3
likely from bactrim  Resolved      tried calling son Chayito Castelan, left a VM      If you have any questions, please feel free to contact me  Steffi Cali  Nephrology Fellow  Pager [place]: [phone]/ [facility]: [phone]    (After 5 pm or on weekends please page the on-call fellow, can check [email or web address] for schedule. Login is jesus [email or web address], schedule under [facility] medicine, psych, derm)

## 2022-07-12 NOTE — PROGRESS NOTE ADULT - SUBJECTIVE AND OBJECTIVE BOX
Transplant Surgery Multidisciplinary Progress Note   --------------------------------------------------------------  R DCD DDRT    4/21/22    Present: Patient seen and examined with multidisciplinary team including Transplant Surgeon: Dr. Tena,  Nephrologist: Dr. Nova, PGY3 Driss, NP Jose, MS4 Kike, unit RN during am rounds.  Disciplines not in attendance will be notified of the plan.     HPI: 66M with PMH: DM type II, HTN, CAD s/p CABG in 2020, AFib on Eliquis, CVA in 2019 due to Afib, Seizure d/o following CVA, last episode was on 4/8/22, h/o GIB in 2/2022 EGD with duodenal ulcer.  ESRD due to DM was on HD since 2019.     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  Donor was 58 , KDPI 82%, DCD, single vessels and ureter, HLA mismatch 1, 2, 2. No DSA, cPRA 0%. 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 Re-admitted in May for anemia in the setting of large perinephric hematoma s/p evacuation and repair of arterial anastomosis on 5/2/22. Intra operative biopsy showed no rejection, Creatinine ranging ~2mg/dL.    In Rehab: He was recently dx with klebsiella UTI rx with ertapenem - then switched to Levaquin day #6.    He also had parainfluenza pneumonia. Was at rehab progressing well.      Hospital course:  - 6/10: transferred from rehab facility for seizure status epilepticus. Intubated for respiratory protection and transferred to MICU.  EEG showed no seizure activity. Neuro consulted.   - 6/11: Extubated. Found to have R pleural effusion. s/p Thoracentesis 1.3L. Eliquis changed to heparin drip.  Post procedure drop in H&H. 5u PRBC, 2 u FFP.    - 6/11 evening: Transferred to SICU for further care in setting of hypoxia, significant R pleural effusion, hgb 6 and hemodynamic instability  - 6/11 Intubated at 9pm and sedated on Precedex.  CT placed, 600ml blood drained.  1L total overnight, started pressors  - 6/11 Received Protamine for PTT >100 (was on Heparin drip started for AF), 2 u FFP and 5u PRBC total  - 6/13 s/p VATS 2.2L old blood removed; second chest tube placed  - 6/18-19: chest tubes removed  - 6/21: ?PRES on MRI, off Envarsus, switched to Belatacept 6/23  - 6/25: Tx to SICU for refractory seizures  - 7/10: Passed bedside S&S in SICU. Downgraded from SICU to floor.     Interval Events:  - Afebrile, VSS  - Lethargic intermittently this morning but mentation much improved today, AOX3 this AM  - TF held overnight for OR ureteral stent removal today  - Speech path consulted for formal swallow evaluation  - SCr 1.16   UOP 2.3L  Lasix 40 mg IV x1    Immunosuppression:   Induction:  Thymoglobulin                                        Maintenance:  - Belatacept: 6/23, 7/4 (initial loading).  Re-loaded 7/8 as there was a gap between doses 2/2 seizures. Next dose 7/18  - MMF 1g BID  - Continue Pred 5  - Ongoing monitoring for signs of rejection.    Potential Discharge date: pending clinical improvement    Education:  Medications    Plan of care:  See Below      MEDICATIONS  (STANDING):  amLODIPine   Tablet 10 milliGRAM(s) Oral <User Schedule>  atorvastatin 40 milliGRAM(s) Oral at bedtime  atovaquone  Suspension 1500 milliGRAM(s) Oral <User Schedule>  chlorhexidine 2% Cloths 1 Application(s) Topical <User Schedule>  doxazosin 4 milliGRAM(s) Oral daily  epoetin cortney-epbx (RETACRIT) Injectable 88438 Unit(s) SubCutaneous every 7 days  finasteride 5 milliGRAM(s) Oral daily  fosphenytoin IVPB 50 milliGRAM(s) PE IV Intermittent <User Schedule>  fosphenytoin IVPB 100 milliGRAM(s) PE IV Intermittent <User Schedule>  hydrALAZINE 100 milliGRAM(s) Oral every 8 hours  insulin lispro (ADMELOG) corrective regimen sliding scale   SubCutaneous every 6 hours  levothyroxine Injectable 40 MICROGram(s) IV Push at bedtime  melatonin 6 milliGRAM(s) Oral at bedtime  mycophenolate mofetil Suspension 1000 milliGRAM(s) Enteral Tube <User Schedule>  pantoprazole   Suspension 40 milliGRAM(s) Oral daily  polyethylene glycol 3350 17 Gram(s) Oral daily  predniSONE  Solution 5 milliGRAM(s) Oral daily  valGANciclovir 50 mG/mL Oral Solution 450 milliGRAM(s) Oral daily    MEDICATIONS  (PRN):  acetaminophen    Suspension .. 650 milliGRAM(s) Enteral Tube every 6 hours PRN Mild Pain (1 - 3)      PAST MEDICAL & SURGICAL HISTORY:  Hypertension      Diabetes      Dyslipidemia      CAD (Coronary Artery Disease)  with Stents in 06/2009 and 6/2019, s/p off-pump C3L on 8/13/20      Hypothyroidism      CVA (cerebral vascular accident)  12/13/19 with residual bilateral weakness      Anemia      PEG (percutaneous endoscopic gastrostomy) status  removed July 2020      Intubation of airway performed without difficulty  Dec 2019  CVA c/b status epilepticus requiring intubation and PEG in 12/2019-      ESRD on dialysis  M/W/F      Seizures  after CVA, last seizure was last week 4/07/22      History of insertion of stent into coronary artery bypass graft  2009 and 2019      S/P CABG x 3  off pump C3L on 8/13/20          Vital Signs Last 24 Hrs  T(C): 36.6 (12 Jul 2022 09:41), Max: 37.2 (11 Jul 2022 16:59)  T(F): 97.8 (12 Jul 2022 08:51), Max: 99 (11 Jul 2022 16:59)  HR: 60 (12 Jul 2022 09:41) (60 - 69)  BP: 151/76 (12 Jul 2022 09:41) (139/64 - 178/77)  BP(mean): 107 (12 Jul 2022 05:38) (92 - 112)  RR: 20 (12 Jul 2022 09:41) (18 - 20)  SpO2: 97% (12 Jul 2022 09:41) (97% - 100%)    Parameters below as of 12 Jul 2022 08:51  Patient On (Oxygen Delivery Method): room air        I&O's Summary    11 Jul 2022 07:01  -  12 Jul 2022 07:00  --------------------------------------------------------  IN: 900 mL / OUT: 2375 mL / NET: -1475 mL    12 Jul 2022 07:01  -  12 Jul 2022 09:49  --------------------------------------------------------  IN: 200 mL / OUT: 125 mL / NET: 75 mL                              9.1    4.52  )-----------( 298      ( 12 Jul 2022 05:54 )             28.7     07-12    138  |  104  |  23  ----------------------------<  106<H>  4.0   |  24  |  1.16    Ca    8.6      12 Jul 2022 05:53  Phos  2.6     07-12  Mg     1.8     07-12    TPro  5.8<L>  /  Alb  2.3<L>  /  TBili  0.2  /  DBili  x   /  AST  19  /  ALT  12  /  AlkPhos  138<H>  07-12        REVIEW OF SYSTEMS  --------------------------------------------------------------------------------        PHYSICAL EXAM:  Constitutional: Well developed / well nourished  Eyes: Anicteric, PERRLA  ENMT: nc/at  Neck: supple  Respiratory: CTA   Cardiovascular: RRR  Gastrointestinal: Soft, non distended, NT, RLQ incision healed   Genitourinary: voiding via tucker  Extremities: SCD's in place and working bilaterally, 2+ pitting edema in bilateral lower extremities   Vascular: Palpable dp pulses bilaterally  Neurological: AAOx2  Skin: no rashes, ulcerations or lesions  Musculoskeletal: Moving all extremities, global weakness  Psychiatric: calm, follows commands and interacts appropriately

## 2022-07-12 NOTE — PROGRESS NOTE ADULT - ASSESSMENT
66 yr old Male with PMHx ESRD s/p permacath removal 5/10/22 s/p Rt DDRT 4/21/22,  CVA (2019), Afib on apixaban, seizure activity, CAD s/p stents, CABG (2020), HTN, HLD, DM on insulin, gastric/duodenal ulcer, recent hospitalization 4/28/22 -5/10/22 with weakness/anemia found to have perinephric hematoma requiring evacuation and repair of bleeding arterial anastomosis. Curently being treated for UTI with Levaquin Today after developing multiple sz episodes refractory to 5 mg versed IM (EMS) and 2 mg ativan IV in E.D. concerning for status epilepticus requiring intubation for hypoxic respiratory  failure. MICU Consult was called for hypoxic respiratory failure secondary to status epilepticus.  Nephrology consulted for renal failure.     A/P  DDRT on 04/21/22  Course complicated by DGF, was on HD until 4/29/22. Readmitted in May for anemia in the setting of large perinephric hematoma s/p evacuation and repair of arterial anastomosis on 5/2/22. Intra operative biopsy showed no rejection.  ANA likely sec to  hemodynamic change   scr remains stable  stent removal today   monitor BMP, U/O     Hyperkalemia   resolved   monitor K closely     HTN   acceptable   monitor BP closely     Immunosuppression  continue per transplant team

## 2022-07-13 NOTE — PROGRESS NOTE ADULT - ASSESSMENT
66 yr old M with Type 2 DM> unknown control due to skewed A1C 6.6% secondary to chronic anemia and s/p blood tx. On basal/bolus insulin therapy PTA. DM c/b ESRD s/p DDRT on 3/21, CVA, CAD s/p CABG, Afib on apixaban, seizure activity, HTN, HLD, h/o GI bleed in 2/2022 EGD with duodenal ulcer, discharged to [facility] [place] rehab center after prior hospitalization, now re-admitted from [facility] [place] for seizures c/f status epilepticus in setting of UTI. endocrine consulted for steroid induced hyperglycemia, now on home dose prednisone 5mg. Prolonged hospital course w/ ICU stay, previously intubated/extubated, previous seizures. S/p ureteral stent removal 7/12/22. Tolerating TF with BG going up to 200s. Per team, pt awaiting swallowing eval today and if he passes test will start POs. Noted BG high 100s to low 200s while on TFs and BGs at goal while off TFs. BG goal 100 to 180s.

## 2022-07-13 NOTE — SWALLOW BEDSIDE ASSESSMENT ADULT - PHARYNGEAL PHASE
Intermittent wet vocal quality/ instance of throat clear post-swallow/Decreased laryngeal elevation/Multiple swallows

## 2022-07-13 NOTE — CONSULT NOTE ADULT - SUBJECTIVE AND OBJECTIVE BOX
CC: Hoarseness.    HPI: 65 YO M with PMH of CAD s/p CABG (), AF on Eliquis c/b CVA () c/b seizures, HTN, HLD, DM type II, hypothyroidism, GI bleed due to a duodenal ulcer (2022), and ESRD s/p DDRT (22) c/b DGF requiring HD (last session 22) & large perinephric hematoma s/p evacuation w/ revision of arterial anastomosis (22) who presented on 6/10/22 for status epilepticus. Patient was intubated for airway protection and  was extubated on . During hospital course, Pt's H&H decreased and right lung looked whiteout on CXR concerning for a hemothorax, pt reintubated for airway protection. On , pt became less responsive with worsening lethargy and was intubated for a third time for airway protection. ENT was consulted for evaluation of Hoarse voice with low volume under Fiberoptic Indirect laryngoscopy. During evaluation, pt currently have NG Tube in left Nare, currently NPO, on Tube feeds. Speech and swallow evaluated pt, recommending MBS. Pt denies throat pain, no drooling/stridor noted. Denies N/V, CP, SOB, HA, runny nose, recent travel.     PAST MEDICAL & SURGICAL HISTORY:  Hypertension  Diabetes  Dyslipidemia  CAD (Coronary Artery Disease) with Stents in 2009 and 2019, s/p off-pump C3L on 20  Hypothyroidism  CVA (cerebral vascular accident)  19 with residual bilateral weakness  Anemia  PEG (percutaneous endoscopic gastrostomy) status  removed 2020  Intubation of airway performed without difficulty  Dec 2019  CVA c/b status epilepticus requiring intubation and PEG in 2019-  ESRD on dialysis M/W/F  Seizures after CVA, last seizure was last week 22  History of insertion of stent into coronary artery bypass graft  and 2019  S/P CABG x 3  off pump C3L on 20    Allergies  No Known Allergies    Intolerances    MEDICATIONS  (STANDING):  amLODIPine   Tablet 10 milliGRAM(s) Oral <User Schedule>  atorvastatin 40 milliGRAM(s) Oral at bedtime  atovaquone  Suspension 1500 milliGRAM(s) Oral <User Schedule>  chlorhexidine 2% Cloths 1 Application(s) Topical <User Schedule>  doxazosin 4 milliGRAM(s) Oral daily  enoxaparin Injectable 60 milliGRAM(s) SubCutaneous every 12 hours  epoetin cortney-epbx (RETACRIT) Injectable 12192 Unit(s) SubCutaneous every 7 days  finasteride 5 milliGRAM(s) Oral daily  fosphenytoin IVPB 50 milliGRAM(s) PE IV Intermittent <User Schedule>  fosphenytoin IVPB 100 milliGRAM(s) PE IV Intermittent <User Schedule>  hydrALAZINE 100 milliGRAM(s) Oral every 8 hours  insulin glargine Injectable (LANTUS) 4 Unit(s) SubCutaneous at bedtime  insulin lispro (ADMELOG) corrective regimen sliding scale   SubCutaneous every 6 hours  levothyroxine Injectable 40 MICROGram(s) IV Push at bedtime  melatonin 6 milliGRAM(s) Oral at bedtime  mycophenolate mofetil Suspension 1000 milliGRAM(s) Enteral Tube <User Schedule>  pantoprazole   Suspension 40 milliGRAM(s) Oral daily  polyethylene glycol 3350 17 Gram(s) Oral daily  predniSONE  Solution 5 milliGRAM(s) Oral daily  valGANciclovir 50 mG/mL Oral Solution 450 milliGRAM(s) Oral daily    MEDICATIONS  (PRN):  acetaminophen    Suspension .. 650 milliGRAM(s) Enteral Tube every 6 hours PRN Mild Pain (1 - 3)    Social History:   Lives at [facility] (10 Raffi 2022 19:11)    Family history:   Family history of cancer of tongue (Father)  Parents are  . Father - at 80 years, tongue cancer was a smoker. Mother- at 71, had accident while crossing road. Siblings- 2 brothers and one sister.    ROS:   ENT: all negative except as noted in HPI   CV: denies palpitations  Pulm: denies SOB, cough, hemoptysis  GI: denies change in apetite, indigestion, n/v  : denies pertinent urinary symptoms, urgency  Neuro: denies numbness/tingling, loss of sensation  Psych: denies anxiety  MS: denies muscle weakness, instability  Heme: denies easy bruising or bleeding  Endo: denies heat/cold intolerance, excessive sweating  Vascular: denies LE edema    Vital Signs Last 24 Hrs  T(C): 37 (2022 21:00), Max: 37.6 (2022 18:03)  T(F): 98.6 (2022 21:00), Max: 99.7 (2022 18:03)  HR: 69 (2022 23:40) (65 - 75)  BP: 168/75 (:40) (143/72 - 180/64)  BP(mean): 108 (:40) (104 - 108)  RR: 18 (:40) (16 - 18)  SpO2: 99% (2022 23:40) (93% - 100%)    Parameters below as of 2022 23:40  Patient On (Oxygen Delivery Method): room air                        9.4    3.94  )-----------( 287      ( 2022 06:26 )             29.8    07-13    136  |  102  |  22  ----------------------------<  169<H>  4.2   |  23  |  1.10    Ca    8.7      2022 06:26  Phos  2.7     07-13  Mg     1.8     07-13    TPro  6.1  /  Alb  2.6<L>  /  TBili  0.2  /  DBili  x   /  AST  19  /  ALT  12  /  AlkPhos  149<H>  07-13   PT/INR - ( 2022 06:26 )   PT: 13.1 sec;   INR: 1.14 ratio     PTT - ( 2022 06:26 )  PTT:38.6 sec    PHYSICAL EXAM:  Gen: NAD, On RA.   Skin: No rashes, bruises, or lesions  Head: Normocephalic, Atraumatic  Face: no edema, erythema, or fluctuance. Parotid glands soft without mass  Eyes: no scleral injection  Nose: + NG Tube in Left Nare.  Nares bilaterally patent, no discharge.   Mouth: No Stridor / Drooling / Trismus.  Mucosa moist, tongue/uvula midline, oropharynx clear  Neck: Flat, supple, no lymphadenopathy, trachea midline, no masses  Lymphatic: No lymphadenopathy  Resp: On RA.   CV: no peripheral edema/cyanosis  GI: nondistended   Peripheral vascular: no JVD or edema  Neuro: facial nerve intact, no facial droop.     Fiberoptic Indirect laryngoscopy:  (Scope #2 used)    Reason for Laryngoscopy: Hoarseness.     Post extubation trauma, post cricoid edema 2/2 LPRD. Vocal cords mobile with good contact b/l.    Nasopharynx, oropharynx, and hypopharynx clear, no bleeding. Tongue base, posterior pharyngeal wall, vallecula, epiglottis, and subglottis appear normal. No erythema, edema, pooling of secretions, masses or lesions. Airway patent, no foreign body visualized. No glottic/supraglottic edema. True vocal cords, arytenoids, vestibular folds, ventricles, pyriform sinuses, and aryepiglottic folds appear normal bilaterally.     IMAGING/ADDITIONAL STUDIES:   CT Head: IMPRESSION: No acute intracranial hemorrhage.    Similar-appearing extensive encephalomalacia and gliosis within the   bilateral high cerebral hemispheres.    Similar-appearing moderate severity chronic white matter microvascular   type changes.  CC: Hoarseness.    HPI: 67 YO M with PMH of CAD s/p CABG (), AF on Eliquis c/b CVA () c/b seizures, HTN, HLD, DM type II, hypothyroidism, GI bleed due to a duodenal ulcer (2022), and ESRD s/p DDRT (22) c/b DGF requiring HD (last session 22) & large perinephric hematoma s/p evacuation w/ revision of arterial anastomosis (22) who presented on 6/10/22 for status epilepticus. Patient was intubated for airway protection and  was extubated on . During hospital course, Pt's H&H decreased and right lung looked whiteout on CXR concerning for a hemothorax, pt reintubated for airway protection. On , pt became less responsive with worsening lethargy and was intubated for a third time for airway protection.  ENT was consulted  for evaluation of Hoarse voice with low volume under Fiberoptic Indirect laryngoscopy. During evaluation, pt currently have NG Tube in left Nare, currently NPO, on Tube feeds. Speech and swallow evaluated pt, recommending MBS. Pt denies throat pain  or difficulty breathing, no drooling/stridor noted. Denies fever/chills,  N/V, CP, SOB, HA, runny nose, odynophagia, hemoptysis, unintentional weight loss.    PAST MEDICAL & SURGICAL HISTORY:  Hypertension  Diabetes  Dyslipidemia  CAD (Coronary Artery Disease) with Stents in 2009 and 2019, s/p off-pump C3L on 20  Hypothyroidism  CVA (cerebral vascular accident)  19 with residual bilateral weakness  Anemia  PEG (percutaneous endoscopic gastrostomy) status  removed 2020  Intubation of airway performed without difficulty  Dec 2019  CVA c/b status epilepticus requiring intubation and PEG in 2019-  ESRD on dialysis M/W/F  Seizures after CVA, last seizure was last week 22  History of insertion of stent into coronary artery bypass graft  and   S/P CABG x 3  off pump C3L on 20    Allergies  No Known Allergies    Intolerances    MEDICATIONS  (STANDING):  amLODIPine   Tablet 10 milliGRAM(s) Oral <User Schedule>  atorvastatin 40 milliGRAM(s) Oral at bedtime  atovaquone  Suspension 1500 milliGRAM(s) Oral <User Schedule>  chlorhexidine 2% Cloths 1 Application(s) Topical <User Schedule>  doxazosin 4 milliGRAM(s) Oral daily  enoxaparin Injectable 60 milliGRAM(s) SubCutaneous every 12 hours  epoetin cortney-epbx (RETACRIT) Injectable 93955 Unit(s) SubCutaneous every 7 days  finasteride 5 milliGRAM(s) Oral daily  fosphenytoin IVPB 50 milliGRAM(s) PE IV Intermittent <User Schedule>  fosphenytoin IVPB 100 milliGRAM(s) PE IV Intermittent <User Schedule>  hydrALAZINE 100 milliGRAM(s) Oral every 8 hours  insulin glargine Injectable (LANTUS) 4 Unit(s) SubCutaneous at bedtime  insulin lispro (ADMELOG) corrective regimen sliding scale   SubCutaneous every 6 hours  levothyroxine Injectable 40 MICROGram(s) IV Push at bedtime  melatonin 6 milliGRAM(s) Oral at bedtime  mycophenolate mofetil Suspension 1000 milliGRAM(s) Enteral Tube <User Schedule>  pantoprazole   Suspension 40 milliGRAM(s) Oral daily  polyethylene glycol 3350 17 Gram(s) Oral daily  predniSONE  Solution 5 milliGRAM(s) Oral daily  valGANciclovir 50 mG/mL Oral Solution 450 milliGRAM(s) Oral daily    MEDICATIONS  (PRN):  acetaminophen    Suspension .. 650 milliGRAM(s) Enteral Tube every 6 hours PRN Mild Pain (1 - 3)    Social History:   Lives at [facility] (10 Raffi 2022 19:11)    Family history:   Family history of cancer of tongue (Father)  Parents are  . Father - at 80 years, tongue cancer was a smoker. Mother- at 71, had accident while crossing road. Siblings- 2 brothers and one sister.    ROS:   ENT: all negative except as noted in HPI   CV: denies palpitations  Pulm: denies SOB, cough, hemoptysis  GI: denies change in apetite, indigestion, n/v  : denies pertinent urinary symptoms, urgency  Neuro: denies numbness/tingling, loss of sensation  Psych: denies anxiety  MS: denies muscle weakness, instability  Heme: denies easy bruising or bleeding  Endo: denies heat/cold intolerance, excessive sweating  Vascular: denies LE edema    Vital Signs Last 24 Hrs  T(C): 37 (2022 21:00), Max: 37.6 (2022 18:03)  T(F): 98.6 (2022 21:00), Max: 99.7 (2022 18:03)  HR: 69 (2022 23:40) (65 - 75)  BP: 168/75 (2022 23:40) (143/72 - 180/64)  BP(mean): 108 (:40) (104 - 108)  RR: 18 (:40) (16 - 18)  SpO2: 99% (:40) (93% - 100%)    Parameters below as of :40  Patient On (Oxygen Delivery Method): room air                        9.4    3.94  )-----------( 287      ( 2022 06:26 )             29.8    07-13    136  |  102  |  22  ----------------------------<  169<H>  4.2   |  23  |  1.10    Ca    8.7      2022 06:26  Phos  2.7     07-13  Mg     1.8     07-13    TPro  6.1  /  Alb  2.6<L>  /  TBili  0.2  /  DBili  x   /  AST  19  /  ALT  12  /  AlkPhos  149<H>  07-13   PT/INR - ( 2022 06:26 )   PT: 13.1 sec;   INR: 1.14 ratio     PTT - ( 2022 06:26 )  PTT:38.6 sec    PHYSICAL EXAM:  Gen: NAD, On RA.   Skin: No rashes, bruises, or lesions  Head: Normocephalic, Atraumatic  Face: no edema, erythema, or fluctuance. Parotid glands soft without mass  Eyes: no scleral injection  Nose: + NG Tube in Left Nare.  Nares bilaterally patent, no discharge.   Mouth: No Stridor / Drooling / Trismus.  dry oral mucosa, tongue/uvula midline, oropharynx clear  Neck: Flat, supple, no lymphadenopathy, trachea midline, no masses  Lymphatic: No lymphadenopathy  Resp: On RA.   CV: no peripheral edema/cyanosis  GI: nondistended   Peripheral vascular: no JVD or edema  Neuro: facial nerve intact, no facial droop.     Fiberoptic Indirect laryngoscopy:  (Scope #2 used)    Reason for Laryngoscopy: Hoarseness.     Post extubation trauma, post cricoid edema 2/2 LPRD. Vocal cords mobile with good contact b/l.    Nasopharynx, oropharynx, and hypopharynx clear, no bleeding. Tongue base, posterior pharyngeal wall, vallecula, epiglottis, and subglottis appear normal. No erythema, edema, pooling of secretions, masses or lesions. Airway patent, no foreign body visualized. No glottic/supraglottic edema. True vocal cords, arytenoids, vestibular folds, ventricles, pyriform sinuses, and aryepiglottic folds appear normal bilaterally.     IMAGING/ADDITIONAL STUDIES:   CT Head: IMPRESSION: No acute intracranial hemorrhage.    Similar-appearing extensive encephalomalacia and gliosis within the   bilateral high cerebral hemispheres.    Similar-appearing moderate severity chronic white matter microvascular   type changes.

## 2022-07-13 NOTE — PROGRESS NOTE ADULT - PROBLEM SELECTOR PLAN 1
-R DCD DDRT (ureter stented) 4/21/22  -Episode of ANA inhouse with peak SCr 2.9; now stable at 1.1 today. Allograft function stable with good UOP  - s/p stent removal on 7/12  -S & S eval pending today    Status epilepticus- Resolved with encephalopathy initially from post ictal state, now possible ICU delirium. LP -ve. vEEG -ve. Now controlled on fosphenytoin. Neuro following. Given interaction b/n Eliquis and fosphenytoin will change to coumadin today as discussed with neuro    Hemothorax- resolved. s/p VATS. Now on RA    RIJ thrombus & Afib, c/w lovenox as per discussion with son    HTN: -170's. Hydralazine 100mg q8, Amlodipine 10mg po daily, Doxazosin 4mg. Await S & S eval to change norvasc to nifedipine for better BP control    Anemia - stable, Continue epo 10,000 weekly    BPH- passed TOV. c/w proscar & doxazosin    Cloudy urine- UA +ve for UTI. f/u Ucx. Afebrile. No leukocytosis. Empiric abx

## 2022-07-13 NOTE — PROGRESS NOTE ADULT - PROBLEM SELECTOR PLAN 2
-Switched from CNI to Belatacept due to neurotoxicity (concern for PRES & seizures)  -Belatacept on 6/23 & 7/8, next dose 7/18  -Increased MMF 1g BID  -continue Pred 5  -PPx:  continue PPI, Atovaquone (discontinued bactrim due to hyperkalemia), continue Valcyte 450mg po daily (CMV intermediate risk)

## 2022-07-13 NOTE — PROGRESS NOTE ADULT - SUBJECTIVE AND OBJECTIVE BOX
[facility] [place] [facility]OLOG[facility]   MD NINA MASON MD, DO SADIE RAMIREZ NP    TEL:  OFFICE: [phone]    From 5pm-7am Answering Service [phone]    -- RENAL FOLLOW UP NOTE ---Date of Service 07-13-22 @ 15:47    Patient is a 66y old  Male who presents with a chief complaint of Status Epilepticus (13 Jul 2022 14:07)      Patient seen and examined at bedside. No chest pain/sob    VITALS:  T(F): 98.7 (07-13-22 @ 13:17), Max: 98.7 (07-13-22 @ 13:17)  HR: 70 (07-13-22 @ 13:17)  BP: 180/64 (07-13-22 @ 13:17)  RR: 18 (07-13-22 @ 13:17)  SpO2: 96% (07-13-22 @ 13:17)  Wt(kg): --    07-12 @ 07:01  -  07-13 @ 07:00  --------------------------------------------------------  IN: 1210 mL / OUT: 1225 mL / NET: -15 mL    07-13 @ 07:01  -  07-13 @ 15:47  --------------------------------------------------------  IN: 670 mL / OUT: 500 mL / NET: 170 mL          PHYSICAL EXAM:  Constitutional: NAD  Neck: No JVD  Respiratory: CTAB, no wheezes, rales or rhonchi  Cardiovascular: S1, S2, RRR  Gastrointestinal: BS+, soft, NT/ND  Extremities: No peripheral edema    Hospital Medications:   MEDICATIONS  (STANDING):  amLODIPine   Tablet 10 milliGRAM(s) Oral <User Schedule>  atorvastatin 40 milliGRAM(s) Oral at bedtime  atovaquone  Suspension 1500 milliGRAM(s) Oral <User Schedule>  chlorhexidine 2% Cloths 1 Application(s) Topical <User Schedule>  doxazosin 4 milliGRAM(s) Oral daily  enoxaparin Injectable 60 milliGRAM(s) SubCutaneous every 12 hours  epoetin cortney-epbx (RETACRIT) Injectable 43153 Unit(s) SubCutaneous every 7 days  finasteride 5 milliGRAM(s) Oral daily  fosphenytoin IVPB 50 milliGRAM(s) PE IV Intermittent <User Schedule>  fosphenytoin IVPB 100 milliGRAM(s) PE IV Intermittent <User Schedule>  hydrALAZINE 100 milliGRAM(s) Oral every 8 hours  insulin glargine Injectable (LANTUS) 4 Unit(s) SubCutaneous at bedtime  insulin lispro (ADMELOG) corrective regimen sliding scale   SubCutaneous every 6 hours  levothyroxine Injectable 40 MICROGram(s) IV Push at bedtime  melatonin 6 milliGRAM(s) Oral at bedtime  mycophenolate mofetil Suspension 1000 milliGRAM(s) Enteral Tube <User Schedule>  pantoprazole   Suspension 40 milliGRAM(s) Oral daily  polyethylene glycol 3350 17 Gram(s) Oral daily  predniSONE  Solution 5 milliGRAM(s) Oral daily  valGANciclovir 50 mG/mL Oral Solution 450 milliGRAM(s) Oral daily      LABS:  07-13    136  |  102  |  22  ----------------------------<  169<H>  4.2   |  23  |  1.10    Ca    8.7      13 Jul 2022 06:26  Phos  2.7     07-13  Mg     1.8     07-13    TPro  6.1  /  Alb  2.6<L>  /  TBili  0.2  /  DBili      /  AST  19  /  ALT  12  /  AlkPhos  149<H>  07-13    Creatinine Trend: 1.10 <--, 1.16 <--, 1.15 <--, 1.16 <--, 1.17 <--, 1.25 <--, 1.25 <--, 1.34 <--, 1.45 <--, 1.46 <--, 1.56 <--, 1.55 <--, 1.53 <--    Albumin, Serum: 2.6 g/dL (07-13 @ 06:26)  Phosphorus Level, Serum: 2.7 mg/dL (07-13 @ 06:26)                              9.4    3.94  )-----------( 287      ( 13 Jul 2022 06:26 )             29.8     Urine Studies:  Urinalysis - [07-12-22 @ 23:55]      Color Yellow / Appearance Slightly Turbid / SG 1.020 / pH 6.0      Gluc Trace / Ketone Negative  / Bili Negative / Urobili Negative       Blood Negative / Protein 30 mg/dL / Leuk Est Large / Nitrite Positive      RBC 6 /  / Hyaline 11 / Gran  / Sq Epi  / Non Sq Epi 1 / Bacteria Negative      Iron 17, TIBC 171, %sat 10      [05-02-22 @ 13:03]  Ferritin 1505      [05-02-22 @ 13:05]  PTH -- (Ca 9.2)      [02-05-22 @ 10:13]   294  HbA1c 6.0      [02-21-20 @ 08:53]  TSH 4.69      [07-06-22 @ 18:36]      Syphilis Screen (Treponema Pallidum Ab) Negative      [06-27-22 @ 02:00]    RADIOLOGY & ADDITIONAL STUDIES:

## 2022-07-13 NOTE — PROGRESS NOTE ADULT - PROBLEM SELECTOR PLAN 1
-Test BG q6h while on TFs or NPO. If starting POs ac and hs  -C/w low dose Admelog correction scale q6h for now  -Start Lantus 4 units q hs while on TFs in addition to correction scale. No need for NPH insulin yet  -If pt starts POs would c/w Admelog low dose correction scale but change timing to ac meals and add low dose at hs. No need for Lantus until able to assess its need   -Please inform endo team of any changes on steroid therapy or PO/TF status  -Will follow and adjust as needed  Discharge  - Likely discharge on basal bolus insulin, Plan TBD based on insulin requirements at DC.   Outpatient f/u with Endocrinologist Dr. Lincoln. [address] [facility] [address]. Phone  Needs apt at time of discharge  -Needs optho/renal/cardiac f/u as out pt  -Make sure pt has insulin sand DM supplies at time of discharge.

## 2022-07-13 NOTE — PROGRESS NOTE ADULT - ATTENDING COMMENTS
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   PMH: DM type II, HTN, CAD s/p CABG in 2020, Afib on Eliquis, CVA in 2019 due to Afib, Seizure d/o following CVA, h/o GI bleed in 2/2022 EGD with duodenal ulcer.  Donor was 58 , KDPI 82%, DCD, single vessels and ureter, HLA mismatch 1, 2, 2. No DSA, cPRA 0%. CMV +/+    Hospitalized May 2022 for anemia in the setting of large perinephric hematoma s/p evacuation and repair of arterial anastomosis on 5/2/22. Intra operative biopsy showed no rejection.  H/o seizure d/o with last seizure episode prior to transplant was on 4/8/22. He is readmitted for status epilepticus in the setting of sub therapeutic valproic acid levels. Course complicated by respiratory failure, hemothorax and hemorrhagic shock following right thoracentesis done on 6/10 for effusion. S/p PRBC x 5 units and vasopressors, chest tube, followed by VATS.    Envarsus discontinued and started on belatacept due to concern for PRES on head imaging - repeat MRI head not consistent with PRES. Shagufta function is stable, mental status is slowly improving. He is on fosphenytoin with good control of seizures.     Transplant ureteric stent removed yesterday. No major events. Still weak and not ambulating. NGT in place, swallow eval pending. Good UOP via condom cath. No PVR     1. S/p DCD DDRT on 4/21/22  - creatinine stable at 1mg/dL. Electrolytes and volume status fair.     2. Immunosuppression - Envarsus discontinued for possible neurotoxicity. Continue Belatacept, next dose on 7/18      Continue Cellcept 1000 mg  bid     Prednisone 5mg po daily      Infection prophylaxis - continue atovaquone (bactrim d/darren for hyperkalemia), continue Valcyte 450mg po daily (CMV intermediate risk)    3. Seizure d/o -admitted with status epilepticus . Now controlled on fosphenytoin.      4. HTN:  BP remains elevated on current regimen Hydralazine 100mg q8, Amlodipine 10mg po daily, Doxazosin 4mg. Change Amlodipine to Nifedipine once he is able to take po.     5. BPH with urine retention - continue Proscar and doxazosin    6. Anemia - stable, Continue epo 10,000 weekly     7. Atrial fibrillation - was on Eliquis, now on therapeutic Lovenox due to drug interaction with fosphenytoin. Family comfortable with continuing with Lovenox for now rather than coumadin.     D/c planning to rehab.

## 2022-07-13 NOTE — PROGRESS NOTE ADULT - SUBJECTIVE AND OBJECTIVE BOX
[facility] DIVISION OF KIDNEY DISEASES AND HYPERTENSION -- FOLLOW UP NOTE  --------------------------------------------------------------------------------      HPI: 67 y/o male w/ a PMHx of CAD s/p CABG (2020), AF on Eliquis c/b CVA (2019) c/b seizures, HTN, HLD, DM type II, hypothyroidism, GI bleed due to a duodenal ulcer (2/2022), and ESRD s/p DDRT (4/21/22) c/b DGF requiring HD (last session 4/29/22) & large perinephric hematoma s/p evacuation w/ revision of arterial anastomosis (5/2/22) who presented on 6/10/22 for status epilepticus. Patient was intubated for airway protection. AEDs were adjusted and he was successfully extubated on 6/11. Patient was noted to have a large right pleural effusion so thoracentesis was performed w/ 1.3 L of serosanguineous fluid drained. Patient was started on a heparin infusion for his atrial fibrillation post-procedure but had a H&H drop w/ CXR concerning for a hemothorax. Patient was transferred to SICU for further management. He was intubated for airway protection & was taken to the OR on 6/15 for a right VATS for hematoma washout and placement of a second chest tube. He was transferred to the floors on 6/17. Both chest tubes were removed on 6/18. He went into status epilepticus again on 6/25 so he was readmitted to SICU. He was intubated for airway protection again on 6/26 and successfully extubated on 6/28. Hospital course has also been significant for delirium, dysphagia requiring NGT placement for enteral access, hyperkalemia.  ?PRES on MRI, off Envarsus, switched to Belatacept 6/23. Last dose of Renee given on 7/8. AED meds changed per neurology. Mental status improving.  s/p ureteral stent removal on 7/12         24 hour events/subjective: Pateint seen & examined. Vitals/ labs/ medications reviewed. Afebrile.  VSS. Mentation improving since out of ICU. s/p ureteral stent removal on 7/12 UOP 1.2L in 24 hrs. S & S pending            PAST HISTORY  --------------------------------------------------------------------------------  No significant changes to PMH, PSH, FHx, SHx, unless otherwise noted    ALLERGIES & MEDICATIONS  --------------------------------------------------------------------------------  Allergies    No Known Allergies    Intolerances      Standing Inpatient Medications  amLODIPine   Tablet 10 milliGRAM(s) Oral <User Schedule>  atorvastatin 40 milliGRAM(s) Oral at bedtime  atovaquone  Suspension 1500 milliGRAM(s) Oral <User Schedule>  chlorhexidine 2% Cloths 1 Application(s) Topical <User Schedule>  doxazosin 4 milliGRAM(s) Oral daily  enoxaparin Injectable 60 milliGRAM(s) SubCutaneous every 12 hours  epoetin cortney-epbx (RETACRIT) Injectable 91683 Unit(s) SubCutaneous every 7 days  finasteride 5 milliGRAM(s) Oral daily  fosphenytoin IVPB 50 milliGRAM(s) PE IV Intermittent <User Schedule>  fosphenytoin IVPB 100 milliGRAM(s) PE IV Intermittent <User Schedule>  hydrALAZINE 100 milliGRAM(s) Oral every 8 hours  insulin lispro (ADMELOG) corrective regimen sliding scale   SubCutaneous every 6 hours  levothyroxine Injectable 40 MICROGram(s) IV Push at bedtime  melatonin 6 milliGRAM(s) Oral at bedtime  mycophenolate mofetil Suspension 1000 milliGRAM(s) Enteral Tube <User Schedule>  pantoprazole   Suspension 40 milliGRAM(s) Oral daily  polyethylene glycol 3350 17 Gram(s) Oral daily  predniSONE  Solution 5 milliGRAM(s) Oral daily  valGANciclovir 50 mG/mL Oral Solution 450 milliGRAM(s) Oral daily    PRN Inpatient Medications  acetaminophen    Suspension .. 650 milliGRAM(s) Enteral Tube every 6 hours PRN      REVIEW OF SYSTEMS  --------------------------------------------------------------------------------  Gen: No fatigue, fevers/chills, weakness  Head/Eyes/Ears/Mouth: No headache  Respiratory: No dyspnea, cough,   CV: No chest pain  GI: No abdominal pain, diarrhea, constipation, nausea, vomiting  Transplant: No pain  : No increased frequency, dysuria, hematuria  MSK:  + edema  Neuro: No dizziness/lightheadedness      All other systems were reviewed and are negative, except as noted.    VITALS/PHYSICAL EXAM  --------------------------------------------------------------------------------  T(C): 36.4 (07-13-22 @ 10:57), Max: 36.8 (07-13-22 @ 01:00)  HR: 66 (07-13-22 @ 10:57) (57 - 71)  BP: 166/72 (07-13-22 @ 10:57) (143/72 - 180/82)  RR: 17 (07-13-22 @ 10:57) (16 - 20)  SpO2: 98% (07-13-22 @ 10:57) (93% - 100%)  Wt(kg): --  Height (cm): 177.8 (07-12-22 @ 09:41)  Weight (kg): 61.7 (07-12-22 @ 09:41)  BMI (kg/m2): 19.5 (07-12-22 @ 09:41)  BSA (m2): 1.77 (07-12-22 @ 09:41)      07-12-22 @ 07:01  -  07-13-22 @ 07:00  --------------------------------------------------------  IN: 1210 mL / OUT: 1225 mL / NET: -15 mL    07-13-22 @ 07:01  -  07-13-22 @ 12:11  --------------------------------------------------------  IN: 320 mL / OUT: 200 mL / NET: 120 mL      Physical Exam:  	Gen: NAD  	HEENT: Anicteric, no JVD  	Pulm: CTA B/L  	CV: RRR, S1S2  	Abd: +BS, soft, nontender/nondistended          Extremities: + bilateral LE edema & UE edema (improving)          Neuro: AAO x 3 with some intermittent confusion  	Skin: Warm, without rashes  	Vascular access:                [place] cath with clear urine          Transplant: No tenderness, swelling        LABS/STUDIES  --------------------------------------------------------------------------------              9.4    3.94  >-----------<  287      [07-13-22 @ 06:26]              29.8     136  |  102  |  22  ----------------------------<  169      [07-13-22 @ 06:26]  4.2   |  23  |  1.10        Ca     8.7     [07-13-22 @ 06:26]      Mg     1.8     [07-13-22 @ 06:26]      Phos  2.7     [07-13-22 @ 06:26]    TPro  6.1  /  Alb  2.6  /  TBili  0.2  /  DBili  x   /  AST  19  /  ALT  12  /  AlkPhos  149  [07-13-22 @ 06:26]    PT/INR: PT 13.1 , INR 1.14       [07-13-22 @ 06:26]  PTT: 38.6       [07-13-22 @ 06:26]      Creatinine Trend:  SCr 1.10 [07-13 @ 06:26]  SCr 1.16 [07-12 @ 05:53]  SCr 1.15 [07-11 @ 06:51]  SCr 1.16 [07-10 @ 19:12]  SCr 1.17 [07-10 @ 05:53]              Urinalysis - [07-12-22 @ 23:55]      Color Yellow / Appearance Slightly Turbid / SG 1.020 / pH 6.0      Gluc Trace / Ketone Negative  / Bili Negative / Urobili Negative       Blood Negative / Protein 30 mg/dL / Leuk Est Large / Nitrite Positive      RBC 6 /  / Hyaline 11 / Gran  / Sq Epi  / Non Sq Epi 1 / Bacteria Negative      Iron 17, TIBC 171, %sat 10      [05-02-22 @ 13:03]  Ferritin 1505      [05-02-22 @ 13:05]  PTH -- (Ca 9.2)      [02-05-22 @ 10:13]   294  HbA1c 6.0      [02-21-20 @ 08:53]  TSH 4.69      [07-06-22 @ 18:36]      Syphilis Screen (Treponema Pallidum Ab) Negative      [06-27-22 @ 02:00]     [facility] DIVISION OF KIDNEY DISEASES AND HYPERTENSION -- FOLLOW UP NOTE  --------------------------------------------------------------------------------      HPI: 65 y/o male w/ a PMHx of CAD s/p CABG (2020), AF on Eliquis c/b CVA (2019) c/b seizures, HTN, HLD, DM type II, hypothyroidism, GI bleed due to a duodenal ulcer (2/2022), and ESRD s/p DDRT (4/21/22) c/b DGF requiring HD (last session 4/29/22) & large perinephric hematoma s/p evacuation w/ revision of arterial anastomosis (5/2/22) who presented on 6/10/22 for status epilepticus. Patient was intubated for airway protection. AEDs were adjusted and he was successfully extubated on 6/11. Patient was noted to have a large right pleural effusion so thoracentesis was performed w/ 1.3 L of serosanguineous fluid drained. Patient was started on a heparin infusion for his atrial fibrillation post-procedure but had a H&H drop w/ CXR concerning for a hemothorax. Patient was transferred to SICU for further management. He was intubated for airway protection & was taken to the OR on 6/15 for a right VATS for hematoma washout and placement of a second chest tube. He was transferred to the floors on 6/17. Both chest tubes were removed on 6/18. He went into status epilepticus again on 6/25 so he was readmitted to SICU. He was intubated for airway protection again on 6/26 and successfully extubated on 6/28. Hospital course has also been significant for delirium, dysphagia requiring NGT placement for enteral access, hyperkalemia.  ?PRES on MRI, off Envarsus, switched to Belatacept 6/23. Last dose of Renee given on 7/8. AED meds changed per neurology. Mental status improving.  s/p ureteral stent removal on 7/12         24 hour events/subjective: Pateint seen & examined. Vitals/ labs/ medications reviewed. Afebrile.  VSS. Mentation improving since out of ICU. s/p ureteral stent removal on 7/12 UOP 1.2L in 24 hrs. S & S pending            PAST HISTORY  --------------------------------------------------------------------------------  No significant changes to PMH, PSH, FHx, SHx, unless otherwise noted    ALLERGIES & MEDICATIONS  --------------------------------------------------------------------------------  Allergies    No Known Allergies    Intolerances      Standing Inpatient Medications  amLODIPine   Tablet 10 milliGRAM(s) Oral <User Schedule>  atorvastatin 40 milliGRAM(s) Oral at bedtime  atovaquone  Suspension 1500 milliGRAM(s) Oral <User Schedule>  chlorhexidine 2% Cloths 1 Application(s) Topical <User Schedule>  doxazosin 4 milliGRAM(s) Oral daily  enoxaparin Injectable 60 milliGRAM(s) SubCutaneous every 12 hours  epoetin cortney-epbx (RETACRIT) Injectable 27450 Unit(s) SubCutaneous every 7 days  finasteride 5 milliGRAM(s) Oral daily  fosphenytoin IVPB 50 milliGRAM(s) PE IV Intermittent <User Schedule>  fosphenytoin IVPB 100 milliGRAM(s) PE IV Intermittent <User Schedule>  hydrALAZINE 100 milliGRAM(s) Oral every 8 hours  insulin lispro (ADMELOG) corrective regimen sliding scale   SubCutaneous every 6 hours  levothyroxine Injectable 40 MICROGram(s) IV Push at bedtime  melatonin 6 milliGRAM(s) Oral at bedtime  mycophenolate mofetil Suspension 1000 milliGRAM(s) Enteral Tube <User Schedule>  pantoprazole   Suspension 40 milliGRAM(s) Oral daily  polyethylene glycol 3350 17 Gram(s) Oral daily  predniSONE  Solution 5 milliGRAM(s) Oral daily  valGANciclovir 50 mG/mL Oral Solution 450 milliGRAM(s) Oral daily    PRN Inpatient Medications  acetaminophen    Suspension .. 650 milliGRAM(s) Enteral Tube every 6 hours PRN      REVIEW OF SYSTEMS  --------------------------------------------------------------------------------  Gen: No fatigue, fevers/chills, weakness  Head/Eyes/Ears/Mouth: No headache  Respiratory: No dyspnea, cough,   CV: No chest pain  GI: No abdominal pain, diarrhea, constipation, nausea, vomiting  Transplant: No pain  : No increased frequency, dysuria, hematuria  MSK:  + edema  Neuro: No dizziness/lightheadedness      All other systems were reviewed and are negative, except as noted.    VITALS/PHYSICAL EXAM  --------------------------------------------------------------------------------  T(C): 36.4 (07-13-22 @ 10:57), Max: 36.8 (07-13-22 @ 01:00)  HR: 66 (07-13-22 @ 10:57) (57 - 71)  BP: 166/72 (07-13-22 @ 10:57) (143/72 - 180/82)  RR: 17 (07-13-22 @ 10:57) (16 - 20)  SpO2: 98% (07-13-22 @ 10:57) (93% - 100%)  Height (cm): 177.8 (07-12-22 @ 09:41)  Weight (kg): 61.7 (07-12-22 @ 09:41)  BMI (kg/m2): 19.5 (07-12-22 @ 09:41)  BSA (m2): 1.77 (07-12-22 @ 09:41)      07-12-22 @ 07:01  -  07-13-22 @ 07:00  --------------------------------------------------------  IN: 1210 mL / OUT: 1225 mL / NET: -15 mL    07-13-22 @ 07:01  -  07-13-22 @ 12:11  --------------------------------------------------------  IN: 320 mL / OUT: 200 mL / NET: 120 mL      Physical Exam:  	Gen: NAD  	HEENT: Anicteric, no JVD  	Pulm: CTA B/L  	CV: RRR, S1S2  	Abd: +BS, soft, nontender/nondistended          Extremities: + bilateral LE edema & UE edema (improving)          Neuro: AAO x 3 with some intermittent confusion  	Skin: Warm, without rashes  	Vascular access:                [place] cath with clear urine          Transplant: No tenderness, swelling        LABS/STUDIES  --------------------------------------------------------------------------------              9.4    3.94  >-----------<  287      [07-13-22 @ 06:26]              29.8     136  |  102  |  22  ----------------------------<  169      [07-13-22 @ 06:26]  4.2   |  23  |  1.10        Ca     8.7     [07-13-22 @ 06:26]      Mg     1.8     [07-13-22 @ 06:26]      Phos  2.7     [07-13-22 @ 06:26]    TPro  6.1  /  Alb  2.6  /  TBili  0.2  /  DBili  x   /  AST  19  /  ALT  12  /  AlkPhos  149  [07-13-22 @ 06:26]    PT/INR: PT 13.1 , INR 1.14       [07-13-22 @ 06:26]  PTT: 38.6       [07-13-22 @ 06:26]      Creatinine Trend:  SCr 1.10 [07-13 @ 06:26]  SCr 1.16 [07-12 @ 05:53]  SCr 1.15 [07-11 @ 06:51]  SCr 1.16 [07-10 @ 19:12]  SCr 1.17 [07-10 @ 05:53]              Urinalysis - [07-12-22 @ 23:55]      Color Yellow / Appearance Slightly Turbid / SG 1.020 / pH 6.0      Gluc Trace / Ketone Negative  / Bili Negative / Urobili Negative       Blood Negative / Protein 30 mg/dL / Leuk Est Large / Nitrite Positive      RBC 6 /  / Hyaline 11 / Gran  / Sq Epi  / Non Sq Epi 1 / Bacteria Negative      Iron 17, TIBC 171, %sat 10      [05-02-22 @ 13:03]  Ferritin 1505      [05-02-22 @ 13:05]  PTH -- (Ca 9.2)      [02-05-22 @ 10:13]   294  HbA1c 6.0      [02-21-20 @ 08:53]  TSH 4.69      [07-06-22 @ 18:36]      Syphilis Screen (Treponema Pallidum Ab) Negative      [06-27-22 @ 02:00]

## 2022-07-13 NOTE — PROGRESS NOTE ADULT - NUTRITIONAL ASSESSMENT
Diet, NPO with Tube Feed:   Tube Feeding Modality: Nasogastric  Nepro with Carb Steady (NEPRORTH)  Total Volume for 24 Hours (mL): 960  Continuous  Starting Tube Feed Rate {mL per Hour}: 20  Increase Tube Feed Rate by (mL): 10     Every 4 hours  Until Goal Tube Feed Rate (mL per Hour): 40  Tube Feed Duration (in Hours): 24  Tube Feed Start Time: 07:00 (07-06-22 @ 11:20) [Active]    Please see RD assessment and/or follow up.  Managed by primary team as well

## 2022-07-13 NOTE — SWALLOW BEDSIDE ASSESSMENT ADULT - SPECIFY REASON(S)
To subjectively assess swallow function, r/o dysphagia
to subjectively assess swallow safety/function; r/o dysphagia.

## 2022-07-13 NOTE — PROGRESS NOTE ADULT - PROBLEM SELECTOR PLAN 3
likely from bactrim  Resolved            If you have any questions, please feel free to contact me  Steffi Cali  Nephrology Fellow  Pager NS: [phone]/ [facility]: [phone]    (After 5 pm or on weekends please page the on-call fellow, can check [email or web address] for schedule. Login is jesus [email or web address], schedule under [facility] medicine, psych, derm)

## 2022-07-13 NOTE — PROVIDER CONTACT NOTE (OTHER) - ASSESSMENT
son stated he is not concerned for DVT as IV seems to be infiltrated. IV was working well and giving good blood return, however, upon rechecking now IV seems to no longer have flashback, am calling IV team to replace IV for safety

## 2022-07-13 NOTE — SWALLOW BEDSIDE ASSESSMENT ADULT - COMMENTS
While in SICU, pt reintubated for airway protection, transfused with blood products, and reversed w/ protamine. A right 28 Fr chest tube was placed on 6/11 w/ a significant amount of sanguinous fluid drained. Patient went to OR on 6/15 with thoracic for Right VATS and a second chest tube placement. Patient recovered well and was transferred to the floor on 6/17 and chest tubes were removed 06/18. Hospital course further c/b 10-11 seizures per hour and transferred to SICU for monitoring. On 6/26, pt became less responsive with worsening lethargy and was intubated for a third time for airway protection. Pt's refractory seizures are likely 2/2 Tacrolimus toxicty with concern for PRES syndrome Pt without seizures for 48hrs, EEG removed, and pt extubated 6/28.  Hospital course has also been significant for delirium, dysphagia requiring NGT placement for enteral access, hyperkalemia, and poor glycemic control.  7/10: Downgraded from SICU to floor.  7/11: Per transplant -> Continue TFs via NGT, passed bedside S&S in SICU, formal S&S ordered  7/12: S/p ureteral stent removal    Swallow Hx: Seen for MBS 08/19/2020 with recommendations for a soft texture diet, with thin liquids via single cup sips.

## 2022-07-13 NOTE — PROGRESS NOTE ADULT - NSPROGADDITIONALINFOA_GEN_ALL_CORE
-Plan discussed with pt/team.  Contact info: [phone] (24/7). pager [phone]  [email or web address] password [email or web address]  Spent  27 minutes assessing pt/labs/meds and discussing plan of care with primary team  Adjusting insulin  Discharge plan  Follow up care

## 2022-07-13 NOTE — CONSULT NOTE ADULT - SUBJECTIVE AND OBJECTIVE BOX
HPI:  66 yr old Male with PMHx ESRD s/p permacath removal 5/10/22 s/p Rt DDRT 22,  CVA (), Afib on apixaban, seizure activity, CAD s/p stents, CABG (), HTN, HLD, DM on insulin, gastric/duodenal ulcer, recent hospitalization 22 -5/10/22 with weakness/anemia found to have perinephric hematoma requiring evacuation and repair of bleeding arterial anastomosis. Curently being treated for UTI with Levaquin Today after developing multiple sz episodes refractory to 5 mg versed IM (EMS) and 2 mg ativan IV in E.D. concerning for status epilepticus requiring intubation for hypoxic respiratory  failure. MICU Consult was called for hypoxic respiratory failure secondary to status epilepticus.     In the ED VS temp 97.8 Axillary, Hrt rate 61 RR26 100% on NRB mask   Per ED noted the patient was noted to be gurgling and was unable to protect airway, and was intubated. The patient has now been accepted to the MICU for further management   (10 Raffi 2022 19:11)    Patient was admitted to MICU on Lizett 10, intubated in ED, extubated , s/p thoracentesis, reintubated , required pressors. Patient seen earlier today on  Steve, son at bedside. Patient with no complaints.     REVIEW OF SYSTEMS  Constitutional - No fever, No weight loss, No fatigue  HEENT - No eye pain, No visual disturbances, No difficulty hearing, No tinnitus, No vertigo, No neck pain  Respiratory - No cough, No wheezing, No shortness of breath  Cardiovascular - No chest pain, No palpitations  Gastrointestinal - No abdominal pain, No nausea, No vomiting, No diarrhea, No constipation  Genitourinary - No dysuria, No frequency, No hematuria, No incontinence  Psychiatric - No depression, No anxiety    VITALS  T(C): 37.6 (22 @ 18:03), Max: 37.6 (22 @ 18:03)  HR: 73 (22 @ 18:03) (65 - 73)  BP: 158/69 (22 @ 18:03) (143/72 - 180/64)  RR: 16 (22 @ 18:03) (16 - 18)  SpO2: 100% (22 @ 18:03) (93% - 100%)  Wt(kg): --    PAST MEDICAL & SURGICAL HISTORY  Hypertension    Diabetes    Dyslipidemia    CAD (Coronary Artery Disease)    CRI (Chronic Renal Insufficiency)    Hypothyroidism    CVA (cerebral vascular accident)    Anemia    PEG (percutaneous endoscopic gastrostomy) status    Intubation of airway performed without difficulty    ESRD on dialysis    Seizures    No significant past surgical history    History of insertion of stent into coronary artery bypass graft    S/P CABG x 3        SOCIAL HISTORY  Smoking - Denied  EtOH - Denied   Drugs - Denied    FUNCTIONAL HISTORY  Lives with family, 4 JARRETT  admitted from [facility], was ambulating with RW, needed assist     CURRENT FUNCTIONAL STATUS   SLP  oropharyngeal dysphagia  NPO     PT  bed mobility max assist   follows simple commands      OT  bed mobility max assist x 2  sitting EOB 5 min with max assist   following 25% simple commands  agitated    FAMILY HISTORY   FH: HTN (hypertension)    Family history of cancer of tongue (Father)        RECENT LABS/IMAGING  CBC Full  -  ( 2022 06:26 )  WBC Count : 3.94 K/uL  RBC Count : 3.09 M/uL  Hemoglobin : 9.4 g/dL  Hematocrit : 29.8 %  Platelet Count - Automated : 287 K/uL  Mean Cell Volume : 96.4 fl  Mean Cell Hemoglobin : 30.4 pg  Mean Cell Hemoglobin Concentration : 31.5 gm/dL  Auto Neutrophil # : 2.77 K/uL  Auto Lymphocyte # : 0.39 K/uL  Auto Monocyte # : 0.32 K/uL  Auto Eosinophil # : 0.10 K/uL  Auto Basophil # : 0.07 K/uL  Auto Neutrophil % : 70.3 %  Auto Lymphocyte % : 9.9 %  Auto Monocyte % : 8.1 %  Auto Eosinophil % : 2.5 %  Auto Basophil % : 1.8 %        136  |  102  |  22  ----------------------------<  169<H>  4.2   |  23  |  1.10    Ca    8.7      2022 06:26  Phos  2.7       Mg     1.8         TPro  6.1  /  Alb  2.6<L>  /  TBili  0.2  /  DBili  x   /  AST  19  /  ALT  12  /  AlkPhos  149<H>      Urinalysis Basic - ( 2022 23:55 )    Color: Yellow / Appearance: Slightly Turbid / S.020 / pH: x  Gluc: x / Ketone: Negative  / Bili: Negative / Urobili: Negative   Blood: x / Protein: 30 mg/dL / Nitrite: Positive   Leuk Esterase: Large / RBC: 6 /hpf /  /HPF   Sq Epi: x / Non Sq Epi: 1 /hpf / Bacteria: Negative    < from: CT Head No Cont (22 @ 10:29) >    IMPRESSION:No acute intracranial hemorrhage.    Similar-appearing extensive encephalomalacia and gliosis within the   bilateral high cerebral hemispheres.    Similar-appearing moderate severity chronic white matter microvascular   type changes.    < end of copied text >  < from: MR Head w/wo IV Cont (22 @ 15:23) >    IMPRESSION: Extensive bilateral frontal parietal gliosis and   encephalomalacia with minimal enhancement which may be related to   ischemic changes versus changes of hypertensive encephalopathy in a   patient with renal transplant on immunosuppressants. No evidence of   cerebritis or abscess.    < end of copied text >      ALLERGIES  No Known Allergies      MEDICATIONS   acetaminophen    Suspension .. 650 milliGRAM(s) Enteral Tube every 6 hours PRN  amLODIPine   Tablet 10 milliGRAM(s) Oral <User Schedule>  atorvastatin 40 milliGRAM(s) Oral at bedtime  atovaquone  Suspension 1500 milliGRAM(s) Oral <User Schedule>  chlorhexidine 2% Cloths 1 Application(s) Topical <User Schedule>  doxazosin 4 milliGRAM(s) Oral daily  enoxaparin Injectable 60 milliGRAM(s) SubCutaneous every 12 hours  epoetin cortney-epbx (RETACRIT) Injectable 13727 Unit(s) SubCutaneous every 7 days  finasteride 5 milliGRAM(s) Oral daily  fosphenytoin IVPB 50 milliGRAM(s) PE IV Intermittent <User Schedule>  fosphenytoin IVPB 100 milliGRAM(s) PE IV Intermittent <User Schedule>  hydrALAZINE 100 milliGRAM(s) Oral every 8 hours  insulin glargine Injectable (LANTUS) 4 Unit(s) SubCutaneous at bedtime  insulin lispro (ADMELOG) corrective regimen sliding scale   SubCutaneous every 6 hours  levothyroxine Injectable 40 MICROGram(s) IV Push at bedtime  melatonin 6 milliGRAM(s) Oral at bedtime  mycophenolate mofetil Suspension 1000 milliGRAM(s) Enteral Tube <User Schedule>  pantoprazole   Suspension 40 milliGRAM(s) Oral daily  polyethylene glycol 3350 17 Gram(s) Oral daily  predniSONE  Solution 5 milliGRAM(s) Oral daily  valGANciclovir 50 mG/mL Oral Solution 450 milliGRAM(s) Oral daily      ----------------------------------------------------------------------------------------  PHYSICAL EXAM  Constitutional - NAD, Comfortable, in bed   HEENT - +NGT   Neck - Supple, No limited ROM  Chest - Breathing comfortably, No wheezing  Cardiovascular - S1S2   Abdomen - Soft   Extremities - No C/C/E, No calf tenderness   Neurologic Exam -  slow processing, follows commands                   Cognitive - Awake, Alert, AAO to self, place, year     Communication - Fluent, No dysarthria        Motor - 4/5     Sensory - Intact to LT    Psychiatric - Mood stable, Affect WNL  ----------------------------------------------------------------------------------------  ASSESSMENT/PLAN  66yMale h/o DM type II, HTN, CAD s/p CABG in , Afib on Eliquis, CVA in 2019 due to Afib, Seizure d/o (last episode was on 22), h/o GI bleed in 2022 EGD with duodenal ulcer and ESRD on HD s/p R DDRT with functional deficits after UTI, status epilepticus, with debility   seizures, on fosphenytoin  off antibiotics   dysphagia, NPO, MBS pending  Pain - Tylenol  Rt IJ DVT,  lovenox   Rehab -   continue bedside therapy  patient required max assist with bed mobility, some improvement in cognitive status, following commands compared to therapy on    needs to make some progress with bedside therapy for acute rehab, start transfers     Will continue to follow for ongoing rehab needs and recommendations.

## 2022-07-13 NOTE — PROGRESS NOTE ADULT - ASSESSMENT
66 yr old Male with PMHx ESRD s/p permacath removal 5/10/22 s/p Rt DDRT 4/21/22,  CVA (2019), Afib on apixaban, seizure activity, CAD s/p stents, CABG (2020), HTN, HLD, DM on insulin, gastric/duodenal ulcer, recent hospitalization 4/28/22 -5/10/22 with weakness/anemia found to have perinephric hematoma requiring evacuation and repair of bleeding arterial anastomosis. Curently being treated for UTI with Levaquin Today after developing multiple sz episodes refractory to 5 mg versed IM (EMS) and 2 mg ativan IV in E.D. concerning for status epilepticus requiring intubation for hypoxic respiratory  failure. MICU Consult was called for hypoxic respiratory failure secondary to status epilepticus.  Nephrology consulted for renal failure.     A/P  DDRT on 04/21/22  Course complicated by DGF, was on HD until 4/29/22. Readmitted in May for anemia in the setting of large perinephric hematoma s/p evacuation and repair of arterial anastomosis on 5/2/22. Intra operative biopsy showed no rejection.  ANA likely sec to  hemodynamic change   stent removal 07/012/22  scr remains stable  monitor BMP, U/O     Hyperkalemia   resolved   monitor K closely     HTN   elevated   manage by transplant team    monitor BP closely     Immunosuppression  continue per transplant team

## 2022-07-13 NOTE — SWALLOW BEDSIDE ASSESSMENT ADULT - SLP PERTINENT HISTORY OF CURRENT PROBLEM
Patient is a 67 y/o male w/ a PMHx of CAD s/p CABG (2020), AF on Eliquis c/b CVA (2019) c/b seizures, HTN, HLD, DM type II, hypothyroidism, GI bleed due to a duodenal ulcer (2/2022), and ESRD s/p DDRT (4/21/22) c/b DGF requiring HD (last session 4/29/22) & large perinephric hematoma s/p evacuation w/ revision of arterial anastomosis (5/2/22) who presented on 6/10/22 for status epilepticus. Patient was intubated for airway protection and transferred to MICU where pt loaded with valproic acid and continued home Keppra w/ resolution of his seizures on EEG, patient was extubated on 6/11. Course c/b a large right pleural effusion s/p thoracentesis performed on 67/11 with 1.3 L of serosanguineous fluid . Subsequently, pt started on a heparin infusion for his A. Fib post-procedure. Pt's H&H decreased and right lung looked whiteout on CXR concerning for a hemothorax. Patient was transferred to SICU for further management.
Per Neuro: Status epilepticus likely provoked in the setting of UTI and insufficient AED coverage, with possible decreased seizure threshold in the setting of known focal motor seizures. 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  Donor was 58 , KDPI 82%, DCD, single vessels and ureter, HLA mismatch 1, 2, 2. No DSA, cPRA 0%. CMV +/+

## 2022-07-13 NOTE — PROGRESS NOTE ADULT - SUBJECTIVE AND OBJECTIVE BOX
Transplant Surgery Multidisciplinary Progress Note   --------------------------------------------------------------  R DCD DDRT    4/21/22    Present: Patient seen and examined with multidisciplinary team including Transplant Surgeon: Dr. Tena,  Nephrologist: Dr. Nova, PGY3 Driss, NP Jose, MS4 Kike, unit RN during am rounds.  Disciplines not in attendance will be notified of the plan.     HPI: 66M with PMH: DM type II, HTN, CAD s/p CABG in 2020, AFib on Eliquis, CVA in 2019 due to Afib, Seizure d/o following CVA, last episode was on 4/8/22, h/o GIB in 2/2022 EGD with duodenal ulcer.  ESRD due to DM was on HD since 2019.     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  Donor was 58 , KDPI 82%, DCD, single vessels and ureter, HLA mismatch 1, 2, 2. No DSA, cPRA 0%. 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 Re-admitted in May for anemia in the setting of large perinephric hematoma s/p evacuation and repair of arterial anastomosis on 5/2/22. Intra operative biopsy showed no rejection, Creatinine ranging ~2mg/dL.    In Rehab: He was recently dx with klebsiella UTI rx with ertapenem - then switched to Levaquin day #6.    He also had parainfluenza pneumonia. Was at rehab progressing well.      Hospital course:  - 6/10: transferred from rehab facility for seizure status epilepticus. Intubated for respiratory protection and transferred to MICU.  EEG showed no seizure activity. Neuro consulted.   - 6/11: Extubated. Found to have R pleural effusion. s/p Thoracentesis 1.3L. Eliquis changed to heparin drip.  Post procedure drop in H&H. 5u PRBC, 2 u FFP.    - 6/11 evening: Transferred to SICU for further care in setting of hypoxia, significant R pleural effusion, hgb 6 and hemodynamic instability  - 6/11 Intubated at 9pm and sedated on Precedex.  CT placed, 600ml blood drained.  1L total overnight, started pressors  - 6/11 Received Protamine for PTT >100 (was on Heparin drip started for AF), 2 u FFP and 5u PRBC total  - 6/13 s/p VATS 2.2L old blood removed; second chest tube placed  - 6/18-19: chest tubes removed  - 6/21: ?PRES on MRI, off Envarsus, switched to Belatacept 6/23  - 6/25: Tx to SICU for refractory seizures  - 7/10: Passed bedside S&S in SICU. Downgraded from SICU to floor.     Interval Events:  - Afebrile, VSS  - More awake and alert this morning. AOX 2-3. CASTILLO  - S/P  ureteral stent removal   - Speech path consulted for formal swallow evaluation  - SCr 1.10   UOP 1.2L     Immunosuppression:   Induction:  Thymoglobulin                                        Maintenance:  - Belatacept: 6/23, 7/4 (initial loading).  Re-loaded 7/8 as there was a gap between doses 2/2 seizures. Next dose 7/18  - MMF 1g BID  - Continue Pred 5  - Ongoing monitoring for signs of rejection.    Potential Discharge date: pending clinical improvement    Education:  Medications    Plan of care:  See Below      MEDICATIONS  (STANDING):  amLODIPine   Tablet 10 milliGRAM(s) Oral <User Schedule>  atorvastatin 40 milliGRAM(s) Oral at bedtime  atovaquone  Suspension 1500 milliGRAM(s) Oral <User Schedule>  chlorhexidine 2% Cloths 1 Application(s) Topical <User Schedule>  doxazosin 4 milliGRAM(s) Oral daily  enoxaparin Injectable 60 milliGRAM(s) SubCutaneous every 12 hours  epoetin cortney-epbx (RETACRIT) Injectable 90455 Unit(s) SubCutaneous every 7 days  finasteride 5 milliGRAM(s) Oral daily  fosphenytoin IVPB 50 milliGRAM(s) PE IV Intermittent <User Schedule>  fosphenytoin IVPB 100 milliGRAM(s) PE IV Intermittent <User Schedule>  hydrALAZINE 100 milliGRAM(s) Oral every 8 hours  insulin lispro (ADMELOG) corrective regimen sliding scale   SubCutaneous every 6 hours  levothyroxine Injectable 40 MICROGram(s) IV Push at bedtime  melatonin 6 milliGRAM(s) Oral at bedtime  mycophenolate mofetil Suspension 1000 milliGRAM(s) Enteral Tube <User Schedule>  pantoprazole   Suspension 40 milliGRAM(s) Oral daily  polyethylene glycol 3350 17 Gram(s) Oral daily  predniSONE  Solution 5 milliGRAM(s) Oral daily  valGANciclovir 50 mG/mL Oral Solution 450 milliGRAM(s) Oral daily    MEDICATIONS  (PRN):  acetaminophen    Suspension .. 650 milliGRAM(s) Enteral Tube every 6 hours PRN Mild Pain (1 - 3)      PAST MEDICAL & SURGICAL HISTORY:  Hypertension      Diabetes      Dyslipidemia      CAD (Coronary Artery Disease)  with Stents in 06/2009 and 6/2019, s/p off-pump C3L on 8/13/20      Hypothyroidism      CVA (cerebral vascular accident)  12/13/19 with residual bilateral weakness      Anemia      PEG (percutaneous endoscopic gastrostomy) status  removed July 2020      Intubation of airway performed without difficulty  Dec 2019  CVA c/b status epilepticus requiring intubation and PEG in 12/2019-      ESRD on dialysis  M/W/F      Seizures  after CVA, last seizure was last week 4/07/22      History of insertion of stent into coronary artery bypass graft  2009 and 2019      S/P CABG x 3  off pump C3L on 8/13/20          Vital Signs Last 24 Hrs  T(C): 36.4 (13 Jul 2022 10:57), Max: 36.8 (13 Jul 2022 01:00)  T(F): 97.6 (13 Jul 2022 10:57), Max: 98.2 (13 Jul 2022 01:00)  HR: 66 (13 Jul 2022 10:57) (62 - 71)  BP: 166/72 (13 Jul 2022 10:57) (143/72 - 180/82)  BP(mean): 104 (13 Jul 2022 10:57) (99 - 116)  RR: 17 (13 Jul 2022 10:57) (17 - 20)  SpO2: 98% (13 Jul 2022 10:57) (93% - 100%)    Parameters below as of 13 Jul 2022 10:57  Patient On (Oxygen Delivery Method): room air        I&O's Summary    12 Jul 2022 07:01  -  13 Jul 2022 07:00  --------------------------------------------------------  IN: 1210 mL / OUT: 1225 mL / NET: -15 mL    13 Jul 2022 07:01  -  13 Jul 2022 12:42  --------------------------------------------------------  IN: 320 mL / OUT: 200 mL / NET: 120 mL                              9.4    3.94  )-----------( 287      ( 13 Jul 2022 06:26 )             29.8     07-13    136  |  102  |  22  ----------------------------<  169<H>  4.2   |  23  |  1.10    Ca    8.7      13 Jul 2022 06:26  Phos  2.7     07-13  Mg     1.8     07-13    TPro  6.1  /  Alb  2.6<L>  /  TBili  0.2  /  DBili  x   /  AST  19  /  ALT  12  /  AlkPhos  149<H>  07-13        REVIEW OF SYSTEMS  --------------------------------------------------------------------------------    Unable to assess fully due to lethargy/delirium    PHYSICAL EXAM:  Constitutional: Well developed / well nourished  Eyes: Anicteric, PERRLA  ENMT: nc/at  Neck: supple  Respiratory: CTA   Cardiovascular: RRR  Gastrointestinal: Soft, non distended, NT, RLQ incision healed   Genitourinary: voiding via tucker  Extremities: SCD's in place and working bilaterally, 1+ pitting edema in bilateral lower extremities   Vascular: Palpable dp pulses bilaterally  Neurological: AAOx2  Skin: no rashes, ulcerations or lesions  Musculoskeletal: Moving all extremities, global weakness  Psychiatric: calm, follows commands and interacts appropriately

## 2022-07-13 NOTE — CHART NOTE - NSCHARTNOTEFT_GEN_A_CORE
Chart reviewed.    Phenytoin level 11.1, corrected 13.6, pt now on lovenox, check another phenytoin level with CMP tomorrow AM prior to AM dose    Case discussed with Dr. Maria

## 2022-07-13 NOTE — SWALLOW BEDSIDE ASSESSMENT ADULT - SWALLOW EVAL: DIAGNOSIS
Consult received and appreciated. Chart reviewed. Attempted to see pt for swallow evaluation. Pt currently off unit for procedure. Per NP Charlotte, Pt will be back on the floor by noon. Will attempt to f/u, as time permits.
65 y/o male with PMH notable for CAD s/p CABG in 2020, CVA, GI bleed, and ESRD s/p DDRT (4/21/22) admitted with seizures; s/p multiple intubations on this admission for seizures/ concern for airway protection. Pt presents with clinical signs of a pharyngeal dysphagia, with 2-3 swallows per bolus which may suggest pharyngeal residue, and intermittent wet vocal quality/ instance of throat clear post-swallow with conservative PO trials, which may represent laryngeal penetration/aspiration. Additionally, patient with large bore NGT which may be negatively impacting swallow function. Given clinical presentation, prolonged NPO status, and multiple intubations on this admission, would proceed with instrumental exam.

## 2022-07-13 NOTE — CONSULT NOTE ADULT - PROBLEM SELECTOR RECOMMENDATION 9
- FOE: Post extubation trauma, post cricoid edema 2/2 LPRD. Vocal cords mobile with good contact b/l.  - F/U MBS.   - Diet per Speech and swallow.   - PPI.   - ENT will follow.   - Call ENT with questions.     # [phone]  ENT PA - FOE: Post extubation trauma, post cricoid edema 2/2 LPRD. Vocal cords mobile with good contact b/l.  - F/U MBS.   - Diet per Speech and swallow.   - PPI.   - Oral care.   - Humidified O2 prn.   - Voice rest.   - Reflux precautions (avoid spicy foods and caffeine, sit upright for 3 hours after eating, etc)  - Follow up outpatient with Dr. Castillo/Karla/Raúl [phone] if needed.  - Call ENT with questions.     # [phone]  ENT PA

## 2022-07-13 NOTE — CONSULT NOTE ADULT - ASSESSMENT
67 YO M with PMH of ESRD s/p DDRT (4/21/22) c/b DGF requiring HD (last session 4/29/22) & large perinephric hematoma s/p evacuation w/ revision of arterial anastomosis (5/2/22) who presented on 6/10/22 for status epilepticus. Patient was intubated x 3 times  for airway protection during this admission. ENT was consulted for evaluation of Hoarse voice with low volume under Fiberoptic Indirect laryngoscopy. During evaluation, pt currently have NG Tube in left Nare, currently NPO, on Tube feeds. Speech and swallow evaluated pt, recommending MBS. FOE showed, Post extubation trauma, post cricoid edema 2/2 LPRD. Vocal cords mobile with good contact b/l. 67 YO M with PMH of ESRD s/p DDRT (4/21/22) c/b DGF requiring HD (last session 4/29/22) & large perinephric hematoma s/p evacuation w/ revision of arterial anastomosis (5/2/22) who presented on 6/10/22 for status epilepticus. Patient was intubated x 3 times  for airway protection during this admission. ENT was consulted for evaluation of Hoarse voice with low volume under Fiberoptic Indirect laryngoscopy. During evaluation, pt currently have NG Tube in left Nare, currently NPO, on Tube feeds. Speech and swallow evaluated pt, recommending MBS. FOE showed, Post extubation trauma, post cricoid edema 2/2 LPRD. Vocal cords mobile with good contact b/l. Hypophonia may be 2/2 deconditioning and NG Tube. On exam, dry oral mucosa.

## 2022-07-13 NOTE — PROVIDER CONTACT NOTE (OTHER) - ASSESSMENT
patient hypertensive. complaining of pain in left upper extremity, extremity is swollen, son at bedside states swelling looks better than when patient was in ICU. due for hydralazine now, will administer as ordered.

## 2022-07-13 NOTE — SWALLOW BEDSIDE ASSESSMENT ADULT - SLP GENERAL OBSERVATIONS
Pt encountered in bed, awake, on room air. +Large bore NGT. Pt's son at bedside, translating, as patient is English/ William speaking. Pt alert and oriented. Hoarse vocal quality with low volume. Able to follow commands.

## 2022-07-13 NOTE — PROGRESS NOTE ADULT - SUBJECTIVE AND OBJECTIVE BOX
DIABETES FOLLOW UP NOTE: Saw pt earlier today    Chief Complaint: Endocrine consult requested for management of T2DM    INTERVAL HX: Pt stable, tolerating TFs of Nepro at 40cc/hr and awaiting for swallowing test today. BG at goal while NPO but going up to 200s while on TFs. No hypoglycemia. Pt denies any N/V/SOB or pain at time of visit. On Prednisone 5mg daily.       Review of Systems:  General: As above  Cardiovascular: No chest pain, palpitations  Respiratory: No SOB, no cough  GI: No nausea, vomiting, abdominal pain  Endocrine: No polyuria, polydipsia or S&Sx of hypoglycemia    Allergies    No Known Allergies    Intolerances      MEDICATIONS:  atorvastatin 40 milliGRAM(s) Oral at bedtime  atovaquone  Suspension 1500 milliGRAM(s) Oral <User Schedule>  insulin lispro (ADMELOG) corrective regimen sliding scale   SubCutaneous every 6 hours  levothyroxine Injectable 40 MICROGram(s) IV Push at bedtime  predniSONE  Solution 5 milliGRAM(s) Oral daily  valGANciclovir 50 mG/mL Oral Solution 450 milliGRAM(s) Oral daily      PHYSICAL EXAM:  VITALS: T(C): 37.1 (07-13-22 @ 13:17)  T(F): 98.7 (07-13-22 @ 13:17), Max: 98.7 (07-13-22 @ 13:17)  HR: 70 (07-13-22 @ 13:17) (64 - 71)  BP: 180/64 (07-13-22 @ 13:17) (143/72 - 180/82)  RR:  (17 - 18)  SpO2:  (93% - 100%)  Wt(kg): --  GENERAL: Male laying in bed in NAD  HEENT: NGT in place with TFs on   Abdomen: Soft, nontender, non distended  Extremities: Warm, no edema in all 4 exts  NEURO: Alert and able to answer simple questions    LABS:  POCT Blood Glucose.: 230 mg/dL (07-13-22 @ 12:23)  POCT Blood Glucose.: 158 mg/dL (07-13-22 @ 05:48)  POCT Blood Glucose.: 178 mg/dL (07-13-22 @ 01:35)  POCT Blood Glucose.: 126 mg/dL (07-12-22 @ 18:17)  POCT Blood Glucose.: 146 mg/dL (07-12-22 @ 13:48)  POCT Blood Glucose.: 145 mg/dL (07-12-22 @ 11:21)  POCT Blood Glucose.: 124 mg/dL (07-12-22 @ 09:05)  POCT Blood Glucose.: 98 mg/dL (07-12-22 @ 05:41)  POCT Blood Glucose.: 206 mg/dL (07-11-22 @ 23:55)  POCT Blood Glucose.: 196 mg/dL (07-11-22 @ 17:36)  POCT Blood Glucose.: 174 mg/dL (07-11-22 @ 13:00)  POCT Blood Glucose.: 195 mg/dL (07-11-22 @ 05:53)  POCT Blood Glucose.: 167 mg/dL (07-11-22 @ 00:11)                            9.4    3.94  )-----------( 287      ( 13 Jul 2022 06:26 )             29.8       07-13    136  |  102  |  22  ----------------------------<  169<H>  4.2   |  23  |  1.10    eGFR: 74    Ca    8.7      07-13  Mg     1.8     07-13  Phos  2.7     07-13    TPro  6.1  /  Alb  2.6<L>  /  TBili  0.2  /  DBili  x   /  AST  19  /  ALT  12  /  AlkPhos  149<H>  07-13    Thyroid Function Tests:  07-06 @ 18:36 TSH 4.69 FreeT4 -- T3 43 Anti TPO -- Anti Thyroglobulin Ab -- TSI --      A1C with Estimated Average Glucose Result: 6.0 % (06-30-22 @ 05:49)  A1C with Estimated Average Glucose Result: 6.6 % (04-24-22 @ 17:12)      Estimated Average Glucose: 126 mg/dL (06-30-22 @ 05:49)  Estimated Average Glucose: 143 mg/dL (04-24-22 @ 17:12)

## 2022-07-13 NOTE — PROGRESS NOTE ADULT - ASSESSMENT
66M with h/o DM type II, HTN, CAD s/p CABG in 2020, Afib on Eliquis, CVA in 2019 due to Afib, Seizure d/o (last episode was on 4/8/22), h/o GI bleed in 2/2022 EGD with duodenal ulcer and ESRD on HD s/p R DDRT from DCD donor on 4/21/22 complicated by DGF requiring HD until 4/29/22 now with good graft function who presented with status epilepticus in setting of UTI/antibiotics and sub-therapeutic valproic acid level     Seizure Disorder:  - Repeat MRI head 6/27 with less concerns for PRES, likely ischemic changes  - Remains seizure free  - Antiepileptic regimen per Neurology, currently on fosphenytoin 50mg AM/100mg PM. Will f/u Neuro recs and discuss PO options once tolerates PO  - Keep Mag > 2  - Infectious w/u negative so far, has been off ABX.      R DCD DDRT (ureter stented) 4/21/22:  - Excellent renal function  - S/P removal of ureteral stent on 7/12    - Strict I/Os, Continue Doxazosin/Proscar. Monitor UOP  - Continue TFs via NGT, passed bedside S&S in SICU, formal S&S today  - No PEG at this time per family  - OOB with RN/PT    Hyperkalemia:   - resolved. Continue to monitor    Immunosuppression:   - Belatacept: 6/23, 7/4 (initial loading).  Re-loaded 7/8 as there was a gap between doses 2/2 seizures. next dose 7/18  - MMF 1g BID  - continue Pred 5  - PPx:  Mepron/Valcyte/PPI    DM  - on Lantus/Lispro, Endocrine following     AFib:  - continue Retacrit weekly  - Eliquis with ~40% less efficacy while on fosphenytoin.  On Lovenox 60BID. Plan to continue switch to Coumadin once discharged home from rehab. Plan discussed with neurology and family  - rate controlled    R IJ DVT  - On Lovenox 60BID    HTN:  - continue Norvasc/Hydralazine/Cardura    DISPO: PT eval for rehab recommendations

## 2022-07-14 NOTE — CHART NOTE - NSCHARTNOTEFT_GEN_A_CORE
Nutrition Follow Up Note  Patient seen for malnutrition follow up.     Chart reviewed, events noted.    As per chart: "Pt 66 M with h/o DM type II, HTN, CAD s/p CABG in , Afib on Eliquis, CVA in  due to Afib, Seizure d/o (last episode was on 22), h/o GI bleed in 2022 EGD with duodenal ulcer and ESRD on HD s/p R DDRT from DCD donor on 22 complicated by DGF requiring HD until 22, now with good graft function who presented with status epilepticus in setting of UTI/antibiotics and sub-therapeutic valproic acid level.  Continue TFs via NGT, passed bedside S&S in SICU, formal S&S done - rec MBS and ENT".    Source: [] Patient       [x] EMR        [] RN        [x] Family at bedside, son      [] Other:    -If unable to interview patient: [] Trach/Vent/BiPAP  [x] Disoriented/confused/inappropriate to interview as per chart, sleeping    Son reports pt tolerating Nepro at goal of 40 ml. Denies pt with nausea, vomiting, diarrhea, or constipation, last BM yesterday (). Son denies having questions/concerns about diet, TF, and nutrition but requests Nepro if/when diet is resumed - spoke to provider. Son made aware RD remains available.     Diet Order:   Diet, NPO with Tube Feed:   Tube Feeding Modality: Nasogastric  Nepro with Carb Steady (NEPRORTH)  Total Volume for 24 Hours (mL): 960  Continuous  Starting Tube Feed Rate {mL per Hour}: 20  Increase Tube Feed Rate by (mL): 10     Every 4 hours  Until Goal Tube Feed Rate (mL per Hour): 40  Tube Feed Duration (in Hours): 24  Tube Feed Start Time: 07:00 (22)    Weights:   Daily Weight in k.3 (), Weight in k.8 (), Weight in k.3 (), Weight in k (), Weight in k.6 (07-10), Weight in k.9 () -?accuracy of weight as pt with edema.     Nutritionally Pertinent MEDICATIONS  (STANDING):  amLODIPine   Tablet  atorvastatin  atovaquone  Suspension  cefepime   IVPB  doxazosin  finasteride  hydrALAZINE  insulin glargine Injectable (LANTUS)  insulin lispro (ADMELOG) corrective regimen sliding scale  levothyroxine Injectable  pantoprazole   Suspension  polyethylene glycol 3350  predniSONE  Solution  valGANciclovir 50 mG/mL Oral Solution    Pertinent Labs: ( @ 06:32): Na 135, BUN 21, Cr 1.08, <H>, K+ 3.8, Phos 2.5, Mg 2.1, Alk Phos 153<H>, ALT/SGPT 9<L>, AST/SGOT 18    A1C with Estimated Average Glucose Result: 6.0 % (22 @ 05:49)  A1C with Estimated Average Glucose Result: 6.6 % (22 @ 17:12)  A1C with Estimated Average Glucose Result: 7.3 % (22 @ 13:42)  A1C with Estimated Average Glucose Result: 7.2 % (22 @ 05:48)    Finger Sticks:  POCT Blood Glucose.: 121 mg/dL ( @ 06:10)  POCT Blood Glucose.: 161 mg/dL ( @ 00:09)  POCT Blood Glucose.: 174 mg/dL ( @ 21:09)  POCT Blood Glucose.: 207 mg/dL ( @ 18:27)  POCT Blood Glucose.: 230 mg/dL ( @ 12:23)    Skin per nursing documentation: pressure ulcers in sacrum stage 2 and dave. buttocks suspected deep tissue injury.   Edema as per flow sheets: +2 dave. ankle and +3 scrotum and dave. wrist and hand.     Estimated Needs:   [x] no change since previous assessment (meets estimated needs for pressure ulcer healing).   [] recalculated:     Previous Nutrition Diagnoses:  [x] Malnutrition; severe  [x] Increased Nutrient Needs      Nutrition Diagnoses are: [x] ongoing - currently being addressed with tube feedings; pending MBS, will add oral nutritional supplements if applicable.     New Nutrition Diagnosis: [x] Not applicable    Nutrition Care Plan:  [x] In Progress      Nutrition Interventions:     Education Provided:       [] Yes  [x] No    Recommendations:      1. If pt to continue on tube feedings: continue Nepro at 40 ml/hr x 24 hrs to provide 960 ml formula, 1728 calories (28 kcal/kg), 78 grams protein (~1.3 gm/kg), 698 ml free water; based on dosing wt 61.7kg; defer fluids to team. Will continue to monitor as able and adjust as needed.   2. If pt to resume PO diet: defer diet/fluid consistencies to medical team/SLP recommendations. Consider Consistent Carbohydrate with snack diet + Nepro 2xday. Will continue to monitor as able and adjust as needed. Spoke to provider.   3. Once applicable, gently encourage PO intake and honor food preferences.   4. Consider Multivitamin if medically feasible to optimize nutrient intake.   5. Continue to obtain weights as able to identify changes if any.     Monitoring and Evaluation:   Continue to monitor as able tolerance to diet/EN prescription, weights, labs, skin integrity, MBS results    RD remains available upon request and will follow up per protocol  Carina Fuller MS RDN CDN [facility] CCTD #[phone] Nutrition Follow Up Note  Patient seen for malnutrition follow up.     Chart reviewed, events noted.    As per chart: "Pt 66 M with h/o DM type II, HTN, CAD s/p CABG in , Afib on Eliquis, CVA in  due to Afib, Seizure d/o (last episode was on 22), h/o GI bleed in 2022 EGD with duodenal ulcer and ESRD on HD s/p R DDRT from DCD donor on 22 complicated by DGF requiring HD until 22, now with good graft function who presented with status epilepticus in setting of UTI/antibiotics and sub-therapeutic valproic acid level.  Continue TFs via NGT, passed bedside S&S in SICU, formal S&S done - rec MBS and ENT".    Source: [] Patient       [x] EMR        [] RN        [x] Family at bedside, son      [] Other:    -If unable to interview patient: [] Trach/Vent/BiPAP  [x] Disoriented/confused/inappropriate to interview as per chart, sleeping    Son reports pt tolerating Nepro at goal of 40 ml. Denies pt with nausea, vomiting, diarrhea, or constipation, last BM yesterday (). Son denies having questions/concerns about diet, TF, and nutrition but requests Nepro if/when diet is resumed - spoke to provider. Son made aware RD remains available.     Diet Order:   Diet, NPO with Tube Feed:   Tube Feeding Modality: Nasogastric  Nepro with Carb Steady (NEPRORTH)  Total Volume for 24 Hours (mL): 960  Continuous  Starting Tube Feed Rate {mL per Hour}: 20  Increase Tube Feed Rate by (mL): 10     Every 4 hours  Until Goal Tube Feed Rate (mL per Hour): 40  Tube Feed Duration (in Hours): 24  Tube Feed Start Time: 07:00 (22)    Weights:   Daily Weight in k.3 (), Weight in k.8 (), Weight in k.3 (), Weight in k (), Weight in k.6 (07-10), Weight in k.9 () -?accuracy of weight as pt with edema.     Nutritionally Pertinent MEDICATIONS  (STANDING):  amLODIPine   Tablet  atorvastatin  atovaquone  Suspension  cefepime   IVPB  doxazosin  finasteride  hydrALAZINE  insulin glargine Injectable (LANTUS)  insulin lispro (ADMELOG) corrective regimen sliding scale  levothyroxine Injectable  pantoprazole   Suspension  polyethylene glycol 3350  predniSONE  Solution  valGANciclovir 50 mG/mL Oral Solution    Pertinent Labs: ( @ 06:32): Na 135, BUN 21, Cr 1.08, <H>, K+ 3.8, Phos 2.5, Mg 2.1, Alk Phos 153<H>, ALT/SGPT 9<L>, AST/SGOT 18    A1C with Estimated Average Glucose Result: 6.0 % (22 @ 05:49)  A1C with Estimated Average Glucose Result: 6.6 % (22 @ 17:12)  A1C with Estimated Average Glucose Result: 7.3 % (22 @ 13:42)  A1C with Estimated Average Glucose Result: 7.2 % (22 @ 05:48)    Finger Sticks:  POCT Blood Glucose.: 121 mg/dL ( @ 06:10)  POCT Blood Glucose.: 161 mg/dL ( @ 00:09)  POCT Blood Glucose.: 174 mg/dL ( @ 21:09)  POCT Blood Glucose.: 207 mg/dL ( @ 18:27)  POCT Blood Glucose.: 230 mg/dL ( @ 12:23)    Skin per nursing documentation: pressure ulcers in sacrum stage 2 and dave. buttocks suspected deep tissue injury.   Edema as per flow sheets: +2 dave. ankle and +3 scrotum and dave. wrist and hand.     Estimated Needs:   [x] no change since previous assessment (meets estimated needs for pressure ulcer healing).   [] recalculated:     Previous Nutrition Diagnoses:  [x] Malnutrition; severe  [x] Increased Nutrient Needs      Nutrition Diagnoses are: [x] ongoing - currently being addressed with tube feedings; pending MBS, will add oral nutritional supplements if applicable.     New Nutrition Diagnosis: [x] Not applicable    Nutrition Care Plan:  [x] In Progress      Nutrition Interventions:     Education Provided:       [] Yes  [x] No    Recommendations:      1. If pt to continue on tube feedings: continue Nepro at 40 ml/hr x 24 hrs to provide 960 ml formula, 1728 calories (28 kcal/kg), 78 grams protein (~1.3 gm/kg), 698 ml free water; based on dosing wt 61.7kg; defer fluids to team. Will continue to monitor as able and adjust as needed.   2. If pt to resume PO diet: defer diet/fluid consistencies to medical team/SLP recommendations. Consider Consistent Carbohydrate with snack diet + Nepro 2xday. Will continue to monitor as able and adjust as needed. Spoke to provider.   3. Once applicable, gently encourage PO intake and honor food preferences.   4. Recommend Multivitamin and Vitamin C if medically feasible to optimize nutrient intake and further aid with pressure ulcer healing.   5. Continue to obtain weights as able to identify changes if any.     Monitoring and Evaluation:   Continue to monitor as able tolerance to diet/EN prescription, weights, labs, skin integrity, MBS results    RD remains available upon request and will follow up per protocol  Carina Fuller MS RDN CDN [facility] CCTD #[phone]

## 2022-07-14 NOTE — PROGRESS NOTE ADULT - SUBJECTIVE AND OBJECTIVE BOX
[facility]   MD NINA MASON MD, DO SADIE RAMIREZ NP    TEL:  OFFICE: [phone]    From 5pm-7am Answering Service [phone]    -- RENAL FOLLOW UP NOTE ---Date of Service 07-14-22 @ 14:00    Patient is a 66y old  Male who presents with a chief complaint of Status Epilepticus (14 Jul 2022 11:43)      Patient seen and examined at bedside. No chest pain/sob    VITALS:  T(F): 97.8 (07-14-22 @ 12:19), Max: 99.7 (07-13-22 @ 18:03)  HR: 70 (07-14-22 @ 12:19)  BP: 158/74 (07-14-22 @ 12:19)  RR: 20 (07-14-22 @ 12:19)  SpO2: 99% (07-14-22 @ 12:19)  Wt(kg): --    07-13 @ 07:01  -  07-14 @ 07:00  --------------------------------------------------------  IN: 1960 mL / OUT: 1550 mL / NET: 410 mL    07-14 @ 07:01  -  07-14 @ 14:00  --------------------------------------------------------  IN: 240 mL / OUT: 0 mL / NET: 240 mL          PHYSICAL EXAM:  Constitutional: NAD  Neck: No JVD  Respiratory: CTAB, no wheezes, rales or rhonchi  Cardiovascular: S1, S2, RRR  Gastrointestinal: BS+, soft, NT/ND  Extremities: + peripheral edema    Hospital Medications:   MEDICATIONS  (STANDING):  amLODIPine   Tablet 10 milliGRAM(s) Oral <User Schedule>  atorvastatin 40 milliGRAM(s) Oral at bedtime  atovaquone  Suspension 1500 milliGRAM(s) Oral <User Schedule>  cefepime   IVPB 1000 milliGRAM(s) IV Intermittent every 8 hours  chlorhexidine 2% Cloths 1 Application(s) Topical <User Schedule>  doxazosin 4 milliGRAM(s) Oral daily  enoxaparin Injectable 60 milliGRAM(s) SubCutaneous every 12 hours  epoetin cortney-epbx (RETACRIT) Injectable 04152 Unit(s) SubCutaneous every 7 days  finasteride 5 milliGRAM(s) Oral daily  fosphenytoin IVPB 50 milliGRAM(s) PE IV Intermittent <User Schedule>  fosphenytoin IVPB 100 milliGRAM(s) PE IV Intermittent <User Schedule>  hydrALAZINE 100 milliGRAM(s) Oral every 8 hours  insulin glargine Injectable (LANTUS) 4 Unit(s) SubCutaneous at bedtime  insulin lispro (ADMELOG) corrective regimen sliding scale   SubCutaneous every 6 hours  levothyroxine Injectable 40 MICROGram(s) IV Push at bedtime  melatonin 6 milliGRAM(s) Oral at bedtime  mycophenolate mofetil Suspension 1000 milliGRAM(s) Enteral Tube <User Schedule>  pantoprazole   Suspension 40 milliGRAM(s) Oral daily  polyethylene glycol 3350 17 Gram(s) Oral daily  predniSONE  Solution 5 milliGRAM(s) Oral daily  valGANciclovir 50 mG/mL Oral Solution 450 milliGRAM(s) Oral daily      LABS:  07-14    135  |  102  |  21  ----------------------------<  112<H>  3.8   |  24  |  1.08    Ca    8.7      14 Jul 2022 06:32  Phos  2.5     07-14  Mg     2.1     07-14    TPro  6.1  /  Alb  2.4<L>  /  TBili  0.2  /  DBili      /  AST  18  /  ALT  9<L>  /  AlkPhos  153<H>  07-14    Creatinine Trend: 1.08 <--, 1.10 <--, 1.16 <--, 1.15 <--, 1.16 <--, 1.17 <--, 1.25 <--, 1.25 <--, 1.34 <--, 1.45 <--, 1.46 <--, 1.56 <--    Albumin, Serum: 2.4 g/dL (07-14 @ 06:32)  Phosphorus Level, Serum: 2.5 mg/dL (07-14 @ 06:32)                              9.9    3.94  )-----------( 293      ( 14 Jul 2022 06:32 )             31.2     Urine Studies:  Urinalysis - [07-12-22 @ 23:55]      Color Yellow / Appearance Slightly Turbid / SG 1.020 / pH 6.0      Gluc Trace / Ketone Negative  / Bili Negative / Urobili Negative       Blood Negative / Protein 30 mg/dL / Leuk Est Large / Nitrite Positive      RBC 6 /  / Hyaline 11 / Gran  / Sq Epi  / Non Sq Epi 1 / Bacteria Negative      Iron 17, TIBC 171, %sat 10      [05-02-22 @ 13:03]  Ferritin 1505      [05-02-22 @ 13:05]  PTH -- (Ca 9.2)      [02-05-22 @ 10:13]   294  HbA1c 6.0      [02-21-20 @ 08:53]  TSH 4.69      [07-06-22 @ 18:36]      Syphilis Screen (Treponema Pallidum Ab) Negative      [06-27-22 @ 02:00]    RADIOLOGY & ADDITIONAL STUDIES:

## 2022-07-14 NOTE — PROGRESS NOTE ADULT - PROBLEM SELECTOR PLAN 4
C/w atorvastatin 40 milliGRAM(s) Oral at bedtime  -F/u lipids as out pt.    discussed w/pt and team  Can be reached via TEAMS/pager: [phone]   office:  [phone] (M-F 9a-5pm)               [phone] (nights/weekends)   [email or web address] password [email or web address]

## 2022-07-14 NOTE — PROGRESS NOTE ADULT - SUBJECTIVE AND OBJECTIVE BOX
Diabetes Follow up note:    Chief complaint: T2DM    Interval Hx: BG values 120-200s over past 24 hours. Pt seen at bedside earlier. Remains on TF at time of visit but now noted to be on advanced PO diet. Reports tolerating feeds w/out any GI issues.     Review of Systems:  General: as above.   GI: Tolerating TFs. Denies N/V/D/Abd pain  CV: Denies CP/SOB  ENDO: No S&Sx of hypoglycemia    MEDS:  atorvastatin 40 milliGRAM(s) Oral at bedtime  finasteride 5 milliGRAM(s) Oral daily  insulin glargine Injectable (LANTUS) 4 Unit(s) SubCutaneous at bedtime  insulin lispro (ADMELOG) corrective regimen sliding scale   SubCutaneous every 6 hours  levothyroxine Injectable 40 MICROGram(s) IV Push at bedtime  predniSONE  Solution 5 milliGRAM(s) Oral daily    atovaquone  Suspension 1500 milliGRAM(s) Oral <User Schedule>  cefepime   IVPB 1000 milliGRAM(s) IV Intermittent every 8 hours  valGANciclovir 50 mG/mL Oral Solution 450 milliGRAM(s) Oral daily    Allergies    No Known Allergies        PE:  General: Male lying in bed. NAD>   Vital Signs Last 24 Hrs  T(C): 36.6 (14 Jul 2022 12:19), Max: 37.6 (13 Jul 2022 18:03)  T(F): 97.8 (14 Jul 2022 12:19), Max: 99.7 (13 Jul 2022 18:03)  HR: 70 (14 Jul 2022 12:19) (59 - 75)  BP: 158/74 (14 Jul 2022 12:19) (158/69 - 179/69)  BP(mean): 119 (14 Jul 2022 04:53) (108 - 119)  RR: 20 (14 Jul 2022 12:19) (16 - 20)  SpO2: 99% (14 Jul 2022 12:19) (99% - 100%)    Parameters below as of 14 Jul 2022 12:19  Patient On (Oxygen Delivery Method): room air    HEENT: NGT in place.   Abd: Soft, NT,ND,   Extremities: Warm  Neuro: A&O X3    LABS:  POCT Blood Glucose.: 187 mg/dL (07-14-22 @ 12:00)  POCT Blood Glucose.: 121 mg/dL (07-14-22 @ 06:10)  POCT Blood Glucose.: 161 mg/dL (07-14-22 @ 00:09)  POCT Blood Glucose.: 174 mg/dL (07-13-22 @ 21:09)  POCT Blood Glucose.: 207 mg/dL (07-13-22 @ 18:27)  POCT Blood Glucose.: 230 mg/dL (07-13-22 @ 12:23)  POCT Blood Glucose.: 158 mg/dL (07-13-22 @ 05:48)  POCT Blood Glucose.: 178 mg/dL (07-13-22 @ 01:35)  POCT Blood Glucose.: 126 mg/dL (07-12-22 @ 18:17)  POCT Blood Glucose.: 146 mg/dL (07-12-22 @ 13:48)  POCT Blood Glucose.: 145 mg/dL (07-12-22 @ 11:21)  POCT Blood Glucose.: 124 mg/dL (07-12-22 @ 09:05)  POCT Blood Glucose.: 98 mg/dL (07-12-22 @ 05:41)  POCT Blood Glucose.: 206 mg/dL (07-11-22 @ 23:55)  POCT Blood Glucose.: 196 mg/dL (07-11-22 @ 17:36)                            9.9    3.94  )-----------( 293      ( 14 Jul 2022 06:32 )             31.2       07-14    135  |  102  |  21  ----------------------------<  112<H>  3.8   |  24  |  1.08    eGFR: 75    Ca    8.7      07-14  Mg     2.1     07-14  Phos  2.5     07-14    TPro  6.1  /  Alb  2.4<L>  /  TBili  0.2  /  DBili  x   /  AST  18  /  ALT  9<L>  /  AlkPhos  153<H>  07-14      Thyroid Function Tests:  07-06 @ 18:36 TSH 4.69 FreeT4 -- T3 43 Anti TPO -- Anti Thyroglobulin Ab -- TSI --      A1C with Estimated Average Glucose Result: 6.0 % (06-30-22 @ 05:49)  A1C with Estimated Average Glucose Result: 6.6 % (04-24-22 @ 17:12)  A1C with Estimated Average Glucose Result: 7.3 % (02-04-22 @ 13:42)  A1C with Estimated Average Glucose Result: 7.2 % (02-04-22 @ 05:48)          Contact number: serge [phone] or [phone]

## 2022-07-14 NOTE — PROVIDER CONTACT NOTE (OTHER) - ASSESSMENT
Pt. A&Ox3 forgetful at times, VS as charted, /69. Pt. symptomatic, denies c/p, dizziness, blurry vision. Pt. A&Ox3 forgetful at times, VS as charted, /69. Pt. asymptomatic, denies c/p, dizziness, blurry vision.

## 2022-07-14 NOTE — SWALLOW VFSS/MBS ASSESSMENT ADULT - RECOMMENDED FEEDING/EATING TECHNIQUES
maintain upright posture during/after eating for 30 mins allow for swallow between intakes/maintain upright posture during/after eating for 30 mins

## 2022-07-14 NOTE — PROGRESS NOTE ADULT - SUBJECTIVE AND OBJECTIVE BOX
[facility] DIVISION OF KIDNEY DISEASES AND HYPERTENSION -- FOLLOW UP NOTE  --------------------------------------------------------------------------------    HPI: 67 y/o male w/ a PMHx of CAD s/p CABG (2020), AF on Eliquis c/b CVA (2019) c/b seizures, HTN, HLD, DM type II, hypothyroidism, GI bleed due to a duodenal ulcer (2/2022), and ESRD s/p DDRT (4/21/22) c/b DGF requiring HD (last session 4/29/22) & large perinephric hematoma s/p evacuation w/ revision of arterial anastomosis (5/2/22) who presented on 6/10/22 for status epilepticus. Patient was intubated for airway protection. AEDs were adjusted and he was successfully extubated on 6/11. Patient was noted to have a large right pleural effusion so thoracentesis was performed w/ 1.3 L of serosanguineous fluid drained. Patient was started on a heparin infusion for his atrial fibrillation post-procedure but had a H&H drop w/ CXR concerning for a hemothorax. Patient was transferred to SICU for further management. He was intubated for airway protection & was taken to the OR on 6/15 for a right VATS for hematoma washout and placement of a second chest tube. He was transferred to the floors on 6/17. Both chest tubes were removed on 6/18. He went into status epilepticus again on 6/25 so he was readmitted to SICU. He was intubated for airway protection again on 6/26 and successfully extubated on 6/28. Hospital course has also been significant for delirium, dysphagia requiring NGT placement for enteral access, hyperkalemia.  ?PRES on MRI, off Envarsus, switched to Belatacept 6/23. Last dose of Renee given on 7/8. AED meds changed per neurology. Mental status improving.  s/p ureteral stent removal on 7/12         24 hour events/subjective: Pateint seen & examined. Vitals/ labs/ medications reviewed. Afebrile.  VSS. Mentation improving. Pulled off condom catheter overnight, noted to have small tear over glans penis. S/p Speech and swallow eval yesterday & recommended MBS and ENT eval. UOP 1.2L in 24 hrs.                PAST HISTORY  --------------------------------------------------------------------------------  No significant changes to PMH, PSH, FHx, SHx, unless otherwise noted    ALLERGIES & MEDICATIONS  --------------------------------------------------------------------------------  Allergies    No Known Allergies    Intolerances      Standing Inpatient Medications  amLODIPine   Tablet 10 milliGRAM(s) Oral <User Schedule>  atorvastatin 40 milliGRAM(s) Oral at bedtime  atovaquone  Suspension 1500 milliGRAM(s) Oral <User Schedule>  cefepime   IVPB 1000 milliGRAM(s) IV Intermittent every 8 hours  chlorhexidine 2% Cloths 1 Application(s) Topical <User Schedule>  doxazosin 4 milliGRAM(s) Oral daily  enoxaparin Injectable 60 milliGRAM(s) SubCutaneous every 12 hours  epoetin cortney-epbx (RETACRIT) Injectable 48691 Unit(s) SubCutaneous every 7 days  finasteride 5 milliGRAM(s) Oral daily  fosphenytoin IVPB 50 milliGRAM(s) PE IV Intermittent <User Schedule>  fosphenytoin IVPB 100 milliGRAM(s) PE IV Intermittent <User Schedule>  hydrALAZINE 100 milliGRAM(s) Oral every 8 hours  insulin glargine Injectable (LANTUS) 4 Unit(s) SubCutaneous at bedtime  insulin lispro (ADMELOG) corrective regimen sliding scale   SubCutaneous every 6 hours  levothyroxine Injectable 40 MICROGram(s) IV Push at bedtime  melatonin 6 milliGRAM(s) Oral at bedtime  mycophenolate mofetil Suspension 1000 milliGRAM(s) Enteral Tube <User Schedule>  pantoprazole   Suspension 40 milliGRAM(s) Oral daily  polyethylene glycol 3350 17 Gram(s) Oral daily  predniSONE  Solution 5 milliGRAM(s) Oral daily  valGANciclovir 50 mG/mL Oral Solution 450 milliGRAM(s) Oral daily    PRN Inpatient Medications  acetaminophen    Suspension .. 650 milliGRAM(s) Enteral Tube every 6 hours PRN      REVIEW OF SYSTEMS  --------------------------------------------------------------------------------  Gen: No fatigue  Head/Eyes/Ears/Mouth: No headache  Respiratory: No dyspnea, cough,   CV: No chest pain  GI: No abdominal pain, diarrhea, nausea, vomiting  Transplant: No pain  : No increased frequency, dysuria, hematuria  MSK:  no edema  Neuro: No dizziness      All other systems were reviewed and are negative, except as noted.    VITALS/PHYSICAL EXAM  --------------------------------------------------------------------------------  T(C): 36.6 (07-14-22 @ 12:19), Max: 37.6 (07-13-22 @ 18:03)  HR: 70 (07-14-22 @ 12:19) (59 - 75)  BP: 158/74 (07-14-22 @ 12:19) (158/69 - 179/69)  RR: 20 (07-14-22 @ 12:19) (16 - 20)  SpO2: 99% (07-14-22 @ 12:19) (98% - 100%)  Wt(kg): --        07-13-22 @ 07:01  -  07-14-22 @ 07:00  --------------------------------------------------------  IN: 1960 mL / OUT: 1550 mL / NET: 410 mL    07-14-22 @ 07:01  -  07-14-22 @ 13:49  --------------------------------------------------------  IN: 240 mL / OUT: 0 mL / NET: 240 mL      Physical Exam:  	Gen: NAD  	HEENT: Anicteric, no JVD  	Pulm: CTA B/L  	CV: RRR, S1S2  	Abd: +BS, soft, nontender/nondistended          Extremities: no LE edema ; b/l UE edema           Neuro: AAO x 3 with some intermittent confusion  	Skin: Warm, without rashes  	Vascular access:              Glans with small tear, no active bleeding          Transplant: No tenderness, swelling        LABS/STUDIES  --------------------------------------------------------------------------------              9.9    3.94  >-----------<  293      [07-14-22 @ 06:32]              31.2     135  |  102  |  21  ----------------------------<  112      [07-14-22 @ 06:32]  3.8   |  24  |  1.08        Ca     8.7     [07-14-22 @ 06:32]      Mg     2.1     [07-14-22 @ 06:32]      Phos  2.5     [07-14-22 @ 06:32]    TPro  6.1  /  Alb  2.4  /  TBili  0.2  /  DBili  x   /  AST  18  /  ALT  9   /  AlkPhos  153  [07-14-22 @ 06:32]    PT/INR: PT 12.3 , INR 1.06       [07-14-22 @ 06:32]  PTT: 34.0       [07-14-22 @ 06:32]      Creatinine Trend:  SCr 1.08 [07-14 @ 06:32]  SCr 1.10 [07-13 @ 06:26]  SCr 1.16 [07-12 @ 05:53]  SCr 1.15 [07-11 @ 06:51]  SCr 1.16 [07-10 @ 19:12]              Urinalysis - [07-12-22 @ 23:55]      Color Yellow / Appearance Slightly Turbid / SG 1.020 / pH 6.0      Gluc Trace / Ketone Negative  / Bili Negative / Urobili Negative       Blood Negative / Protein 30 mg/dL / Leuk Est Large / Nitrite Positive      RBC 6 /  / Hyaline 11 / Gran  / Sq Epi  / Non Sq Epi 1 / Bacteria Negative      Iron 17, TIBC 171, %sat 10      [05-02-22 @ 13:03]  Ferritin 1505      [05-02-22 @ 13:05]  PTH -- (Ca 9.2)      [02-05-22 @ 10:13]   294  HbA1c 6.0      [02-21-20 @ 08:53]  TSH 4.69      [07-06-22 @ 18:36]      Syphilis Screen (Treponema Pallidum Ab) Negative      [06-27-22 @ 02:00]

## 2022-07-14 NOTE — PROGRESS NOTE ADULT - PROBLEM SELECTOR PLAN 1
-Test BG AC/HS  -c/w Lantus 4 units QHS for now.   -Change Admelog to low correction scale AC meals and Low HS scale  -Please inform endo team of any changes on steroid therapy or PO/TF status  -RD follow up appreciated-change to cons carb diet w/2 cans of nepro daily  -Will follow and adjust as needed  Discharge  - Likely discharge on basal bolus insulin, Plan TBD based on insulin requirements at DC.   Outpatient f/u with Endocrinologist Dr. Lincoln. [address] [facility] [address]. Phone  Needs apt at time of discharge  -Needs optho/renal/cardiac f/u as out pt  -Make sure pt has insulin sand DM supplies at time of discharge.

## 2022-07-14 NOTE — SWALLOW VFSS/MBS ASSESSMENT ADULT - ASPIRATION PRECAUTIONS
Monitor for s/s aspiration/laryngeal penetration. If noted:  D/C p.o. intake, provide non-oral nutrition/hydration/meds, and contact this service @ [phone] Monitor for s/s aspiration/laryngeal penetration. If noted:  D/C p.o. intake, provide non-oral nutrition/hydration/meds, and contact this service @ [phone]

## 2022-07-14 NOTE — PROGRESS NOTE ADULT - SUBJECTIVE AND OBJECTIVE BOX
Follow Up:      Interval History:    REVIEW OF SYSTEMS  [  ] ROS unobtainable because:    [  ] All other systems negative except as noted below    Constitutional:  [ ] fever [ ] chills  [ ] weight loss  [ ] weakness  Skin:  [ ] rash [ ] phlebitis	  Eyes: [ ] icterus [ ] pain  [ ] discharge	  ENMT: [ ] sore throat  [ ] thrush [ ] ulcers [ ] exudates  Respiratory: [ ] dyspnea [ ] hemoptysis [ ] cough [ ] sputum	  Cardiovascular:  [ ] chest pain [ ] palpitations [ ] edema	  Gastrointestinal:  [ ] nausea [ ] vomiting [ ] diarrhea [ ] constipation [ ] pain	  Genitourinary:  [ ] dysuria [ ] frequency [ ] hematuria [ ] discharge [ ] flank pain  [ ] incontinence  Musculoskeletal:  [ ] myalgias [ ] arthralgias [ ] arthritis  [ ] back pain  Neurological:  [ ] headache [ ] seizures  [ ] confusion/altered mental status    Allergies  No Known Allergies        ANTIMICROBIALS:  atovaquone  Suspension 1500 <User Schedule>  cefepime   IVPB 1000 every 8 hours  valGANciclovir 50 mG/mL Oral Solution 450 daily      OTHER MEDS:  MEDICATIONS  (STANDING):  acetaminophen    Suspension .. 650 every 6 hours PRN  amLODIPine   Tablet 10 <User Schedule>  atorvastatin 40 at bedtime  doxazosin 4 daily  enoxaparin Injectable 60 every 12 hours  epoetin cortney-epbx (RETACRIT) Injectable 96936 every 7 days  finasteride 5 daily  fosphenytoin IVPB 50 <User Schedule>  fosphenytoin IVPB 100 <User Schedule>  hydrALAZINE 100 every 8 hours  insulin glargine Injectable (LANTUS) 4 at bedtime  insulin lispro (ADMELOG) corrective regimen sliding scale  three times a day with meals  insulin lispro (ADMELOG) corrective regimen sliding scale  at bedtime  levothyroxine Injectable 40 at bedtime  melatonin 6 at bedtime  mycophenolate mofetil Suspension 1000 <User Schedule>  pantoprazole   Suspension 40 daily  polyethylene glycol 3350 17 daily  predniSONE  Solution 5 daily      Vital Signs Last 24 Hrs  T(C): 36.6 (2022 12:19), Max: 37.6 (2022 18:03)  T(F): 97.8 (2022 12:19), Max: 99.7 (2022 18:03)  HR: 70 (2022 12:19) (59 - 75)  BP: 158/74 (2022 12:19) (158/69 - 179/69)  BP(mean): 119 (2022 04:53) (108 - 119)  RR: 20 (2022 12:19) (16 - 20)  SpO2: 99% (2022 12:19) (99% - 100%)    Parameters below as of 2022 12:19  Patient On (Oxygen Delivery Method): room air        PHYSICAL EXAMINATION:  General: Alert and Awake, NAD  HEENT: PERRL, EOMI  Neck: Supple  Cardiac: RRR, No M/R/G  Resp: CTAB, No Wh/Rh/Ra  Abdomen: NBS, NT/ND, No HSM, No rigidity or guarding  MSK: No LE edema. No Calf tenderness  : No tucker  Skin: No rashes or lesions. Skin is warm and dry to the touch.   Neuro: Alert and Awake. CN 2-12 Grossly intact. Moves all four extremities spontaneously.  Psych: Calm, Pleasant, Cooperative                          9.9    3.94  )-----------( 293      ( 2022 06:32 )             31.2       07-14    135  |  102  |  21  ----------------------------<  112<H>  3.8   |  24  |  1.08    Ca    8.7      2022 06:32  Phos  2.5     07-14  Mg     2.1     07-14    TPro  6.1  /  Alb  2.4<L>  /  TBili  0.2  /  DBili  x   /  AST  18  /  ALT  9<L>  /  AlkPhos  153<H>  07-14      Urinalysis Basic - ( 2022 23:55 )    Color: Yellow / Appearance: Slightly Turbid / S.020 / pH: x  Gluc: x / Ketone: Negative  / Bili: Negative / Urobili: Negative   Blood: x / Protein: 30 mg/dL / Nitrite: Positive   Leuk Esterase: Large / RBC: 6 /hpf /  /HPF   Sq Epi: x / Non Sq Epi: 1 /hpf / Bacteria: Negative        MICROBIOLOGY:  v  Clean Catch Clean Catch (Midstream)  22   50,000 - 99,000 CFU/mL Klebsiella pneumoniae  --  --      .CSF CSF  22   No growth at 1 week.  --    No polymorphonuclear leukocytes seen  No organisms seen  by cytocentrifuge      .Other Other  22   No Nocardia isolated  --  --      .Blood Blood-Peripheral  22   No Growth Final  --  --      Combi-Cath Combi-Cath  22   Normal Respiratory Rachel present  --    Few polymorphonuclear leukocytes per low power field  Rare Squamous Epithelial Cells per low power field  Rare Gram Positive Rods per oil power field      .Blood Blood-Peripheral  22   No Growth Final  --  --      .Blood Blood-Peripheral  22   Growth in anaerobic bottle: Staphylococcus epidermidis  Coag Negative Staphylococcus  Single set isolate, possible contaminant. Contact  Microbiology if susceptibility testing clinically  indicated.  ***Blood Panel PCR results on this specimen are available  approximately 3 hours after the Gram stain result.***  Gram stain, PCR, and/or culture results may not always  correspond due to difference in methodologies.  ************************************************************  This PCR assay was performed by multiplex PCR. This  Assay tests for 66 bacterial and resistance gene targets.  Please refer to the [facility] test directory  at [email or web address] for details.  --  Blood Culture PCR      Catheterized Catheterized  22   No growth  --  --      .Blood Blood-Peripheral  22   No Growth Final  --  --      .Blood Blood  22   No Growth Final  --  --                RADIOLOGY:    <The imaging below has been reviewed and visualized by me independently. Findings as detailed in report below> Follow Up:  Positive UCx    Interval History: afebrile. denies dysuria or frequency. denies suprapubic pain.     REVIEW OF SYSTEMS  [  ] ROS unobtainable because:    [  x] All other systems negative except as noted below    Constitutional:  [ ] fever [ ] chills  [ ] weight loss  [ ] weakness  Skin:  [ ] rash [ ] phlebitis	  Eyes: [ ] icterus [ ] pain  [ ] discharge	  ENMT: [ ] sore throat  [ ] thrush [ ] ulcers [ ] exudates  Respiratory: [ ] dyspnea [ ] hemoptysis [ ] cough [ ] sputum	  Cardiovascular:  [ ] chest pain [ ] palpitations [ ] edema	  Gastrointestinal:  [ ] nausea [ ] vomiting [ ] diarrhea [ ] constipation [ ] pain	  Genitourinary:  [ ] dysuria [ ] frequency [ ] hematuria [ ] discharge [ ] flank pain  [ ] incontinence  Musculoskeletal:  [ ] myalgias [ ] arthralgias [ ] arthritis  [ ] back pain  Neurological:  [ ] headache [ ] seizures  [ ] confusion/altered mental status    Allergies  No Known Allergies        ANTIMICROBIALS:  atovaquone  Suspension 1500 <User Schedule>  cefepime   IVPB 1000 every 8 hours  valGANciclovir 50 mG/mL Oral Solution 450 daily      OTHER MEDS:  MEDICATIONS  (STANDING):  acetaminophen    Suspension .. 650 every 6 hours PRN  amLODIPine   Tablet 10 <User Schedule>  atorvastatin 40 at bedtime  doxazosin 4 daily  enoxaparin Injectable 60 every 12 hours  epoetin cortney-epbx (RETACRIT) Injectable 77989 every 7 days  finasteride 5 daily  fosphenytoin IVPB 50 <User Schedule>  fosphenytoin IVPB 100 <User Schedule>  hydrALAZINE 100 every 8 hours  insulin glargine Injectable (LANTUS) 4 at bedtime  insulin lispro (ADMELOG) corrective regimen sliding scale  three times a day with meals  insulin lispro (ADMELOG) corrective regimen sliding scale  at bedtime  levothyroxine Injectable 40 at bedtime  melatonin 6 at bedtime  mycophenolate mofetil Suspension 1000 <User Schedule>  pantoprazole   Suspension 40 daily  polyethylene glycol 3350 17 daily  predniSONE  Solution 5 daily      Vital Signs Last 24 Hrs  T(C): 36.6 (2022 12:19), Max: 37.6 (2022 18:03)  T(F): 97.8 (2022 12:19), Max: 99.7 (2022 18:03)  HR: 70 (2022 12:19) (59 - 75)  BP: 158/74 (2022 12:19) (158/69 - 179/69)  BP(mean): 119 (2022 04:53) (108 - 119)  RR: 20 (2022 12:19) (16 - 20)  SpO2: 99% (2022 12:19) (99% - 100%)    Parameters below as of 2022 12:19  Patient On (Oxygen Delivery Method): room air        PHYSICAL EXAMINATION:  General: Alert and Awake, NAD  Cardiac: RRR, No M/R/G  Resp: CTAB, No Wh/Rh/Ra  Abdomen: NBS, NT/ND, No HSM, No rigidity or guarding  MSK: No LE edema. No Calf tenderness  : No tucker  Skin: No rashes or lesions. Skin is warm and dry to the touch.   Neuro: Alert and Awake. CN 2-12 Grossly intact. Moves all four extremities spontaneously.  Psych: Calm, Pleasant, Cooperative                          9.9    3.94  )-----------( 293      ( 2022 06:32 )             31.2       07-14    135  |  102  |  21  ----------------------------<  112<H>  3.8   |  24  |  1.08    Ca    8.7      2022 06:32  Phos  2.5     07-14  Mg     2.1     07-14    TPro  6.1  /  Alb  2.4<L>  /  TBili  0.2  /  DBili  x   /  AST  18  /  ALT  9<L>  /  AlkPhos  153<H>  07-14      Urinalysis Basic - ( 2022 23:55 )    Color: Yellow / Appearance: Slightly Turbid / S.020 / pH: x  Gluc: x / Ketone: Negative  / Bili: Negative / Urobili: Negative   Blood: x / Protein: 30 mg/dL / Nitrite: Positive   Leuk Esterase: Large / RBC: 6 /hpf /  /HPF   Sq Epi: x / Non Sq Epi: 1 /hpf / Bacteria: Negative        MICROBIOLOGY:  v  Clean Catch Clean Catch (Midstream)  22   50,000 - 99,000 CFU/mL Klebsiella pneumoniae  --  --      .CSF CSF  22   No growth at 1 week.  --    No polymorphonuclear leukocytes seen  No organisms seen  by cytocentrifuge      .Other Other  22   No Nocardia isolated  --  --      .Blood Blood-Peripheral  22   No Growth Final  --  --      Combi-Cath Combi-Cath  22   Normal Respiratory Rachel present  --    Few polymorphonuclear leukocytes per low power field  Rare Squamous Epithelial Cells per low power field  Rare Gram Positive Rods per oil power field      .Blood Blood-Peripheral  22   No Growth Final  --  --      .Blood Blood-Peripheral  22   Growth in anaerobic bottle: Staphylococcus epidermidis  Coag Negative Staphylococcus  Single set isolate, possible contaminant. Contact  Microbiology if susceptibility testing clinically  indicated.  ***Blood Panel PCR results on this specimen are available  approximately 3 hours after the Gram stain result.***  Gram stain, PCR, and/or culture results may not always  correspond due to difference in methodologies.  ************************************************************  This PCR assay was performed by multiplex PCR. This  Assay tests for 66 bacterial and resistance gene targets.  Please refer to the [facility] test directory  at [email or web address] for details.  --  Blood Culture PCR      Catheterized Catheterized  22   No growth  --  --      .Blood Blood-Peripheral  22   No Growth Final  --  --      .Blood Blood  22   No Growth Final  --  --    RADIOLOGY:    <The imaging below has been reviewed and visualized by me independently. Findings as detailed in report below>    < from: [facility] Duplex Upper Ext Vein Scan, Left (07.10.22 @ 12:26) >  IMPRESSION:  No evidence of left upper extremity deep venous thrombosis.    Left cephalic vein superficial thrombus phlebitis.    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    < end of copied text >

## 2022-07-14 NOTE — PROGRESS NOTE ADULT - NS PANP COMMENT GEN_ALL_CORE FT
doing well after VATS hematoma evacuation.   Immuno: tac/cellcept 500mg BID/pred
improving mental status.   immuno: jitendra, MMF 1gm BID, pred 5mg.

## 2022-07-14 NOTE — PROGRESS NOTE ADULT - PROBLEM SELECTOR PLAN 3
likely from bactrim  Resolved            If you have any questions, please feel free to contact me  Steffi Cali  Nephrology Fellow  Pager NS: [phone]/ [facility]: [phone]    (After 5 pm or on weekends please page the on-call fellow, can check [email or web address] for schedule. Login is jesus cristobal, schedule under [facility] medicine, psych, derm)

## 2022-07-14 NOTE — CHART NOTE - NSCHARTNOTEFT_GEN_A_CORE
Chart reviewed.    Pt's phenytoin level 8.9, corrected to 11.7.  Pt assessed by speech and swallow and recommended for regular diet with thin liquids.    Impression: recent status epilepticus 2/2 unclear etiology, possibly 2/2 PRES vs. Klebsiella UTI in pt with known focal motor epilepsy.     Recommendations:  [] Change foshenytoin IV to phenytoin PO. Increase to phenytoin 100mg BID  [] monitor clinically for seizures  [x] For stroke standpoint, should restart anticoagulation for secondary stroke prevention given afib hx if not otherwise contraindicated (given potential interaction with eliquis and phenytoin, agree with either lovenox or coumadin for anticoagulation), continue discussion with family for anticoagulation decision; pt currently on full dose lovenox  [x] CSF without evidence of infection, inflammation  [x] Vitamin B12 1157, folate >20, homocysteine 13.4, Vitamin B1 160, methylmalonic acid high 478  [x] TSH 4.6, T3 3.9/T4 43 - correction of hypothyroidism per primary team and endocrinology  [x] BP goals of normotension   [x] MR brain without clear evidence of infection, inflammation, or acute CNS event (possibly minimal enhancement). Although prior study read as possible PRES, most recent MRI seems more consistent with significant chronic ischemic microvascular disease given no associated DWI changes or obvious enhancement  [x] Telemetry monitoring  [x] Neuro checks and vital signs per unit protocol  [x] Seizure/fall/aspiration precautions  [] please have adequate sleeping environment for the patient, light on, curtains open, TV on during the day with regular stimulation and out of bed to chair if possible. During the night, close curtains, TV off, lights off, and minimize interruptions (limit blood draws and vital sign checks if possible). Regular interaction with patient with staff (introducing selves), frequent reorientation, and encouragement of visitors as allowed by hospital and unit policy. Regular toileting.  [] maintain electrolytes within normal limits  [] Advise against driving, operating heavy machinery, water sports/bathing, activity in elevation without appropriate safety precautions  [] outpatient EMG/ NCS  [] outpatient neurology f/u with epilepsy at [address] [place] [address].    Case discussed with Dr. Maria Chart reviewed.    Pt's phenytoin level 8.9, corrected to 11.7.  Pt assessed by speech and swallow and recommended for regular diet with thin liquids.    Impression: recent status epilepticus 2/2 unclear etiology, possibly 2/2 PRES vs. Klebsiella UTI in pt with known focal motor epilepsy.     Recommendations:  [] Change foshenytoin IV to phenytoin PO. Increase to phenytoin 100mg BID  [] monitor clinically for seizures  [x] For stroke standpoint, should restart anticoagulation for secondary stroke prevention given afib hx if not otherwise contraindicated (given potential interaction with eliquis and phenytoin, agree with either lovenox or coumadin for anticoagulation), continue discussion with family for anticoagulation decision; pt currently on full dose lovenox  [x] CSF without evidence of infection, inflammation  [x] Vitamin B12 1157, folate >20, homocysteine 13.4, Vitamin B1 160, methylmalonic acid high 478  [x] TSH 4.6, T3 3.9/T4 43 - correction of hypothyroidism per primary team and endocrinology  [x] BP goals of normotension   [x] MR brain without clear evidence of infection, inflammation, or acute CNS event (possibly minimal enhancement). Although prior study read as possible PRES, most recent MRI seems more consistent with significant chronic ischemic microvascular disease given no associated DWI changes or obvious enhancement  [x] Telemetry monitoring  [x] Neuro checks and vital signs per unit protocol  [x] Seizure/fall/aspiration precautions  [] please have adequate sleeping environment for the patient, light on, curtains open, TV on during the day with regular stimulation and out of bed to chair if possible. During the night, close curtains, TV off, lights off, and minimize interruptions (limit blood draws and vital sign checks if possible). Regular interaction with patient with staff (introducing selves), frequent reorientation, and encouragement of visitors as allowed by hospital and unit policy. Regular toileting.  [] maintain electrolytes within normal limits  [] Advise against driving, operating heavy machinery, water sports/bathing, activity in elevation without appropriate safety precautions  [] outpatient EMG/ NCS  [] outpatient neurology f/u with epilepsy at [address] [place] [address].    Case discussed with Dr. Maria and Dr. Allred

## 2022-07-14 NOTE — SWALLOW VFSS/MBS ASSESSMENT ADULT - SLP PERTINENT HISTORY OF CURRENT PROBLEM
Patient is a 65 y/o male w/ a PMHx of CAD s/p CABG (2020), AF on Eliquis c/b CVA (2019) c/b seizures, HTN, HLD, DM type II, hypothyroidism, GI bleed due to a duodenal ulcer (2/2022), and ESRD s/p DDRT (4/21/22) c/b DGF requiring HD (last session 4/29/22) & large perinephric hematoma s/p evacuation w/ revision of arterial anastomosis (5/2/22) who presented on 6/10/22 for status epilepticus. Patient was intubated for airway protection and transferred to MICU where pt loaded with valproic acid and continued home Keppra w/ resolution of his seizures on EEG, patient was extubated on 6/11. Course c/b a large right pleural effusion s/p thoracentesis performed on 67/11 with 1.3 L of serosanguineous fluid . Subsequently, pt started on a heparin infusion for his A. Fib post-procedure. Pt's H&H decreased and right lung looked whiteout on CXR concerning for a hemothorax. Patient was transferred to SICU for further management.

## 2022-07-14 NOTE — PROGRESS NOTE ADULT - ATTENDING COMMENTS
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   PMH: DM type II, HTN, CAD s/p CABG in 2020, Afib on Eliquis, CVA in 2019 due to Afib, Seizure d/o following CVA, h/o GI bleed in 2/2022 EGD with duodenal ulcer.  Donor was 58 , KDPI 82%, DCD, single vessels and ureter, HLA mismatch 1, 2, 2. No DSA, cPRA 0%. CMV +/+    Hospitalized May 2022 for anemia in the setting of large perinephric hematoma s/p evacuation and repair of arterial anastomosis on 5/2/22. Intra operative biopsy showed no rejection.  H/o seizure d/o with last seizure episode prior to transplant was on 4/8/22. He is readmitted for status epilepticus in the setting of sub therapeutic valproic acid levels. Course complicated by respiratory failure, hemothorax and hemorrhagic shock following right thoracentesis done on 6/10 for effusion. S/p PRBC x 5 units and vasopressors, chest tube, followed by VATS.    Envarsus discontinued and started on belatacept due to concern for PRES on head imaging - repeat MRI head not consistent with PRES. Shagufta function is stable, mental status is slowly improving. He is on fosphenytoin with good control of seizures.     Transplant ureteric stent removed 7/12. Seen by ENT yesterday - has vocal cord edema due to recent intubation. Suggested barium swallow  - planned today. NGT remains in place. Not ambulating. Not a candidate for acute rehab per PT.     1. S/p DCD DDRT on 4/21/22  - creatinine stable at 1mg/dL. Electrolytes and volume status fair.     2. Immunosuppression - Envarsus discontinued for possible neurotoxicity. Continue Belatacept, next dose on 7/18      Continue Cellcept 1000 mg  bid     Prednisone 5mg po daily      Infection prophylaxis - continue atovaquone (bactrim d/darren for hyperkalemia), continue Valcyte 450mg po daily (CMV intermediate risk)    3. Seizure d/o -admitted with status epilepticus . Now controlled on fosphenytoin.      4. HTN:  BP remains elevated on current regimen Hydralazine 100mg q8, Amlodipine 10mg po daily, Doxazosin 4mg. Change Amlodipine to Nifedipine once he is able to take po.     5. BPH with urine retention - continue Proscar and doxazosin    6. Anemia - stable, Continue epo 10,000 weekly     7. Atrial fibrillation - was on Eliquis, now on therapeutic Lovenox due to drug interaction with fosphenytoin. Family comfortable with continuing with Lovenox for now rather than coumadin.     8. Gram negative UTI - started on Cefepime restarted.     9. Dysphagia due to pharyngeal edema from recent intubation - awaiting barium swallow. If abnormal may need PEG tube     D/c planning to sub acute rehab.

## 2022-07-14 NOTE — PROGRESS NOTE ADULT - ASSESSMENT
66 yr old M with Type 2 DM> unknown control due to skewed A1C 6.6% secondary to chronic anemia and s/p blood tx. On basal/bolus insulin therapy PTA. DM c/b ESRD s/p DDRT on 3/21, CVA, CAD s/p CABG, Afib on apixaban, seizure activity, HTN, HLD, h/o GI bleed in 2/2022 EGD with duodenal ulcer, discharged to [facility] rehab center after prior hospitalization, now re-admitted from [facility] for seizures c/f status epilepticus in setting of UTI. endocrine consulted for steroid induced hyperglycemia, now on home dose prednisone 5mg. Prolonged hospital course w/ ICU stay, previously intubated/extubated, previous seizures. S/p ureteral stent removal 7/12/22. Tolerating TF at goal w/BG values mostly at goal but now s/p swallow eval to have PO diet restarted. BG goal (100-180mg/dl).

## 2022-07-14 NOTE — PROGRESS NOTE ADULT - ASSESSMENT
66 year old Male with PMHx ESRD s/p permacath removal 5/10/22 s/p Rt DDRT 4/21/22,  CVA (2019), Afib on apixaban, seizure activity, CAD s/p stents, CABG (2020), HTN, HLD, DM on insulin, gastric/duodenal ulcer, recent hospitalization 4/28/22 -5/10/22 with weakness/anemia found to have perinephric hematoma requiring evacuation and repair of bleeding arterial anastomosis who presented to [facility] for status epilepticus requiring intubation for hypoxic respiratory failure    Subsequently developed continued seizures and hypothermia  Was initiated on empiric antibiotics  s/p LP which was not suggestive of infectious process  Blood, urine and sputum cultures without positive sample    U/A (7/12) 161 WBC  UCx (7/13) 50-99K GNR  No obvious urinary symptoms  Started on Cefepime  Last previous positive urine culture (at rehab prior to admission)  with ESBL organism    #Positive Urine Culture, DDRT Recipient    --We can give short course of IV Antibiotics (3 days). Increase Cefepime to 1g IV Q8H  --If fever / worsening urinary symptoms would start Ertapenem    I will continue to follow. Please feel free to contact me with any further questions.    Carlton Hoover M.D.  [facility] Division of Infectious Disease  8AM-5PM Monday - Friday: Available on [employer] Teams  After Hours and Holidays (or if no response on [employer] Teams): Please contact the Infectious Diseases Office at [phone]    The above assessment and plan were discussed with transplant surgery team

## 2022-07-14 NOTE — PROGRESS NOTE ADULT - ASSESSMENT
66M with h/o DM type II, HTN, CAD s/p CABG in 2020, Afib on Eliquis, CVA in 2019 due to Afib, Seizure d/o (last episode was on 4/8/22), h/o GI bleed in 2/2022 EGD with duodenal ulcer and ESRD on HD s/p R DDRT from DCD donor on 4/21/22 complicated by DGF requiring HD until 4/29/22 now with good graft function who presented with status epilepticus in setting of UTI/antibiotics and sub-therapeutic valproic acid level     Seizure Disorder:  - Repeat MRI head 6/27 with less concerns for PRES, likely ischemic changes  - Remains seizure free  - Antiepileptic regimen per Neurology, currently on fosphenytoin 50mg AM/100mg PM. Will f/u Neuro recs and discuss PO options once tolerates PO  - Keep Mag > 2  - Infectious w/u negative so far, has been off ABX.      R DCD DDRT (ureter stented) 4/21/22:  - Excellent renal function  - S/P removal of ureteral stent on 7/12    - Strict I/Os, Continue Doxazosin/Proscar. Monitor UOP  - Continue TFs via NGT, passed bedside S&S in SICU, formal S&S today  - No PEG at this time per family  - OOB with RN/PT    Hyperkalemia:   - resolved. Continue to monitor    Immunosuppression:   - Belatacept: 6/23, 7/4 (initial loading).  Re-loaded 7/8 as there was a gap between doses 2/2 seizures. next dose 7/18  - MMF 1g BID  - continue Pred 5  - PPx:  Mepron/Valcyte/PPI    DM  - on Lantus/Lispro, Endocrine following     AFib:  - continue Retacrit weekly  - Eliquis with ~40% less efficacy while on fosphenytoin.  On Lovenox 60BID. Plan to continue switch to Coumadin once discharged home from rehab. Plan discussed with neurology and family  - rate controlled    R IJ DVT  - On Lovenox 60BID    HTN:  - continue Norvasc/Hydralazine/Cardura    DISPO: PT eval for rehab recommendations     66M with h/o DM type II, HTN, CAD s/p CABG in 2020, Afib on Eliquis, CVA in 2019 due to Afib, Seizure d/o (last episode was on 4/8/22), h/o GI bleed in 2/2022 EGD with duodenal ulcer and ESRD on HD s/p R DDRT from DCD donor on 4/21/22 complicated by DGF requiring HD until 4/29/22 now with good graft function who presented with status epilepticus in setting of UTI/antibiotics and sub-therapeutic valproic acid level     Seizure Disorder:  - Repeat MRI head 6/27 with less concerns for PRES, likely ischemic changes  - Remains seizure free  - Antiepileptic regimen per Neurology, currently on fosphenytoin 50mg AM/100mg PM. Will f/u Neuro recs and discuss PO options once tolerates PO.   - Keep Mag > 2  - Infectious w/u negative so far, has been off ABX.      R DCD DDRT (ureter stented) 4/21/22:  - Excellent renal function  - S/P removal of ureteral stent on 7/12    - Strict I/Os, Continue Doxazosin/Proscar. Monitor UOP  - Continue TFs via NGT, passed bedside S&S in SICU, formal S&S done 7/13- rec MBS and ENT. MBS today  - ENT: post extubation trauma/post cricoid edema 2/2 LPRD. Vocal cords good. Reflux precautions (no spicy food and caffeine once has diet). Voice rest.  - No PEG at this time per family  - OOB with RN/PT    Hyperkalemia:   - resolved. Continue to monitor    Immunosuppression:   - Belatacept: 6/23, 7/4 (initial loading).  Re-loaded 7/8 as there was a gap between doses 2/2 seizures. next dose 7/18  - MMF 1g BID  - continue Pred 5  - PPx:  Mepron/Valcyte/PPI    DM  - on Lantus/Lispro, Endocrine following     AFib:  - continue Retacrit weekly  - Eliquis with ~40% less efficacy while on fosphenytoin.  On Lovenox 60BID. Plan to continue switch to Coumadin once discharged home from rehab. Plan discussed with neurology and family  - rate controlled    R IJ DVT  - On Lovenox 60BID    HTN:  - continue Norvasc/Hydralazine/Cardura    DISPO: PT eval for rehab recommendations

## 2022-07-14 NOTE — SWALLOW VFSS/MBS ASSESSMENT ADULT - SLP GENERAL OBSERVATIONS
Pt encountered in radiology, secure in AYDIN chair. Awake, alert, on room air. William/English speaking, able to utilize English for this exam. Fully cooperative throughout.

## 2022-07-14 NOTE — PROGRESS NOTE ADULT - PROBLEM SELECTOR PLAN 1
-R DCD DDRT (ureter stented) 4/21/22  -Episode of ANA inhouse with peak SCr 2.9; now stable at 1.1 today. Allograft function stable with good UOP  - s/p stent removal on 7/12  -MBS today  - ENT: post extubation trauma/post cricoid edema 2/2 Laryngopharyngeal reflux disease.  Reflux precautions       Status epilepticus- Resolved with encephalopathy initially from post ictal state, now possible ICU delirium. LP -ve. vEEG -ve. Now controlled on fosphenytoin. Neuro following. Given interaction b/n Eliquis and fosphenytoin will change to coumadin today as discussed with neuro    Hemothorax- resolved. s/p VATS. Now on RA    RIJ thrombus & Afib, c/w lovenox as per discussion with son    HTN: -170's. Hydralazine 100mg q8, Amlodipine 10mg po daily, Doxazosin 4mg. When pt able to swallow can change norvasc to nifedipine for better BP control    Anemia - stable, Continue epo 10,000 weekly    BPH- passed TOV. c/w proscar & doxazosin    Cloudy urine- UA +ve for UTI. Ucx with < 100,000 cfu klebsiella. Afebrile. No leukocytosis. May stop empiric abx if remains afebrile without leukocytosis

## 2022-07-14 NOTE — SWALLOW VFSS/MBS ASSESSMENT ADULT - DIAGNOSTIC IMPRESSIONS
Pt presents with a functional oropharyngeal swallow. Pt presents with a functional oropharyngeal swallow. Mastication is adequate. Swallow trigger is timely. No laryngeal penetration/aspiration. Minimal pharyngeal residue clears with a spontaneous repeat swallow. No indication for diet modification at this time.

## 2022-07-14 NOTE — PROGRESS NOTE ADULT - SUBJECTIVE AND OBJECTIVE BOX
Transplant Surgery Multidisciplinary Progress Note   --------------------------------------------------------------  R DCD DDRT    4/21/22    Present: Patient seen and examined with multidisciplinary team including Transplant Surgeon: Dr. David,  Nephrologist: Dr. Nova, PGY3 Driss, [facility], MS4 Kike, unit RN during am rounds.  Disciplines not in attendance will be notified of the plan.     HPI: 66M with PMH: DM type II, HTN, CAD s/p CABG in 2020, AFib on Eliquis, CVA in 2019 due to Afib, Seizure d/o following CVA, last episode was on 4/8/22, h/o GIB in 2/2022 EGD with duodenal ulcer.  ESRD due to DM was on HD since 2019.     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  Donor was 58 , KDPI 82%, DCD, single vessels and ureter, HLA mismatch 1, 2, 2. No DSA, cPRA 0%. 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 Re-admitted in May for anemia in the setting of large perinephric hematoma s/p evacuation and repair of arterial anastomosis on 5/2/22. Intra operative biopsy showed no rejection, Creatinine ranging ~2mg/dL.    In Rehab: He was recently dx with klebsiella UTI rx with ertapenem - then switched to Levaquin day #6.    He also had parainfluenza pneumonia. Was at rehab progressing well.      Hospital course:  - 6/10: transferred from rehab facility for seizure status epilepticus. Intubated for respiratory protection and transferred to MICU.  EEG showed no seizure activity. Neuro consulted.   - 6/11: Extubated. Found to have R pleural effusion. s/p Thoracentesis 1.3L. Eliquis changed to heparin drip.  Post procedure drop in H&H. 5u PRBC, 2 u FFP.    - 6/11 evening: Transferred to SICU for further care in setting of hypoxia, significant R pleural effusion, hgb 6 and hemodynamic instability  - 6/11 Intubated at 9pm and sedated on Precedex.  CT placed, 600ml blood drained.  1L total overnight, started pressors  - 6/11 Received Protamine for PTT >100 (was on Heparin drip started for AF), 2 u FFP and 5u PRBC total  - 6/13 s/p VATS 2.2L old blood removed; second chest tube placed  - 6/18-19: chest tubes removed  - 6/21: ?PRES on MRI, off Envarsus, switched to Belatacept 6/23  - 6/25: Tx to SICU for refractory seizures  - 7/10: Passed bedside S&S in SICU. Downgraded from SICU to floor.     Interval Events:  - Afebrile, HTNsive overnight to 179/69; was given hydralazine 10 IV X1 and labetalol 10 IV X1.   - Pulled off condom catheter overnight, noted to have small tear over glans.  - AOX3 this AM, lethargic and slow to respond but answers all questions when spoken to in [facility].   - S/p Speech and swallow eval yesterday. Recommended MBS and ENT eval to assess the vocal cords. Was seen by ENT yesterday: rec  - Speech path consulted for formal swallow evaluation  - SCr 1.10   UOP 1.2L     Immunosuppression:   Induction:  Thymoglobulin                                        Maintenance:  - Belatacept: 6/23, 7/4 (initial loading).  Re-loaded 7/8 as there was a gap between doses 2/2 seizures. Next dose 7/18  - MMF 1g BID  - Continue Pred 5  - Ongoing monitoring for signs of rejection.    Potential Discharge date: pending clinical improvement    Education:  Medications    Plan of care:  See Below      MEDICATIONS  (STANDING):  amLODIPine   Tablet 10 milliGRAM(s) Oral <User Schedule>  atorvastatin 40 milliGRAM(s) Oral at bedtime  atovaquone  Suspension 1500 milliGRAM(s) Oral <User Schedule>  chlorhexidine 2% Cloths 1 Application(s) Topical <User Schedule>  doxazosin 4 milliGRAM(s) Oral daily  enoxaparin Injectable 60 milliGRAM(s) SubCutaneous every 12 hours  epoetin cortney-epbx (RETACRIT) Injectable 52076 Unit(s) SubCutaneous every 7 days  finasteride 5 milliGRAM(s) Oral daily  fosphenytoin IVPB 50 milliGRAM(s) PE IV Intermittent <User Schedule>  fosphenytoin IVPB 100 milliGRAM(s) PE IV Intermittent <User Schedule>  hydrALAZINE 100 milliGRAM(s) Oral every 8 hours  insulin lispro (ADMELOG) corrective regimen sliding scale   SubCutaneous every 6 hours  levothyroxine Injectable 40 MICROGram(s) IV Push at bedtime  melatonin 6 milliGRAM(s) Oral at bedtime  mycophenolate mofetil Suspension 1000 milliGRAM(s) Enteral Tube <User Schedule>  pantoprazole   Suspension 40 milliGRAM(s) Oral daily  polyethylene glycol 3350 17 Gram(s) Oral daily  predniSONE  Solution 5 milliGRAM(s) Oral daily  valGANciclovir 50 mG/mL Oral Solution 450 milliGRAM(s) Oral daily    MEDICATIONS  (PRN):  acetaminophen    Suspension .. 650 milliGRAM(s) Enteral Tube every 6 hours PRN Mild Pain (1 - 3)      PAST MEDICAL & SURGICAL HISTORY:  Hypertension      Diabetes      Dyslipidemia      CAD (Coronary Artery Disease)  with Stents in 06/2009 and 6/2019, s/p off-pump C3L on 8/13/20      Hypothyroidism      CVA (cerebral vascular accident)  12/13/19 with residual bilateral weakness      Anemia      PEG (percutaneous endoscopic gastrostomy) status  removed July 2020      Intubation of airway performed without difficulty  Dec 2019  CVA c/b status epilepticus requiring intubation and PEG in 12/2019-      ESRD on dialysis  M/W/F      Seizures  after CVA, last seizure was last week 4/07/22      History of insertion of stent into coronary artery bypass graft  2009 and 2019      S/P CABG x 3  off pump C3L on 8/13/20          Vital Signs Last 24 Hrs  T(C): 36.4 (13 Jul 2022 10:57), Max: 36.8 (13 Jul 2022 01:00)  T(F): 97.6 (13 Jul 2022 10:57), Max: 98.2 (13 Jul 2022 01:00)  HR: 66 (13 Jul 2022 10:57) (62 - 71)  BP: 166/72 (13 Jul 2022 10:57) (143/72 - 180/82)  BP(mean): 104 (13 Jul 2022 10:57) (99 - 116)  RR: 17 (13 Jul 2022 10:57) (17 - 20)  SpO2: 98% (13 Jul 2022 10:57) (93% - 100%)    Parameters below as of 13 Jul 2022 10:57  Patient On (Oxygen Delivery Method): room air        I&O's Summary    12 Jul 2022 07:01  -  13 Jul 2022 07:00  --------------------------------------------------------  IN: 1210 mL / OUT: 1225 mL / NET: -15 mL    13 Jul 2022 07:01  -  13 Jul 2022 12:42  --------------------------------------------------------  IN: 320 mL / OUT: 200 mL / NET: 120 mL                              9.4    3.94  )-----------( 287      ( 13 Jul 2022 06:26 )             29.8     07-13    136  |  102  |  22  ----------------------------<  169<H>  4.2   |  23  |  1.10    Ca    8.7      13 Jul 2022 06:26  Phos  2.7     07-13  Mg     1.8     07-13    TPro  6.1  /  Alb  2.6<L>  /  TBili  0.2  /  DBili  x   /  AST  19  /  ALT  12  /  AlkPhos  149<H>  07-13        REVIEW OF SYSTEMS  --------------------------------------------------------------------------------    Unable to assess fully due to lethargy/delirium    PHYSICAL EXAM:  Constitutional: Well developed / well nourished  Eyes: Anicteric, PERRLA  ENMT: nc/at  Neck: supple  Respiratory: CTA   Cardiovascular: RRR  Gastrointestinal: Soft, non distended, NT, RLQ incision healed   Genitourinary: voiding via tucker  Extremities: SCD's in place and working bilaterally, 1+ pitting edema in bilateral lower extremities   Vascular: Palpable dp pulses bilaterally  Neurological: AAOx2  Skin: no rashes, ulcerations or lesions  Musculoskeletal: Moving all extremities, global weakness  Psychiatric: calm, follows commands and interacts appropriately Transplant Surgery Multidisciplinary Progress Note   --------------------------------------------------------------  R DCD DDRT    4/21/22    Present: Patient seen and examined with multidisciplinary team including Transplant Surgeon: Dr. David,  Nephrologist: Dr. Nova, PGY3 Driss, [facility], MS4 Kike, unit RN during am rounds.  Disciplines not in attendance will be notified of the plan.     HPI: 66M with PMH: DM type II, HTN, CAD s/p CABG in 2020, AFib on Eliquis, CVA in 2019 due to Afib, Seizure d/o following CVA, last episode was on 4/8/22, h/o GIB in 2/2022 EGD with duodenal ulcer.  ESRD due to DM was on HD since 2019.     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  Donor was 58 , KDPI 82%, DCD, single vessels and ureter, HLA mismatch 1, 2, 2. No DSA, cPRA 0%. 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 Re-admitted in May for anemia in the setting of large perinephric hematoma s/p evacuation and repair of arterial anastomosis on 5/2/22. Intra operative biopsy showed no rejection, Creatinine ranging ~2mg/dL.    In Rehab: He was recently dx with klebsiella UTI rx with ertapenem - then switched to Levaquin day #6.    He also had parainfluenza pneumonia. Was at rehab progressing well.      Hospital course:  - 6/10: transferred from rehab facility for seizure status epilepticus. Intubated for respiratory protection and transferred to MICU.  EEG showed no seizure activity. Neuro consulted.   - 6/11: Extubated. Found to have R pleural effusion. s/p Thoracentesis 1.3L. Eliquis changed to heparin drip.  Post procedure drop in H&H. 5u PRBC, 2 u FFP.    - 6/11 evening: Transferred to SICU for further care in setting of hypoxia, significant R pleural effusion, hgb 6 and hemodynamic instability  - 6/11 Intubated at 9pm and sedated on Precedex.  CT placed, 600ml blood drained.  1L total overnight, started pressors  - 6/11 Received Protamine for PTT >100 (was on Heparin drip started for AF), 2 u FFP and 5u PRBC total  - 6/13 s/p VATS 2.2L old blood removed; second chest tube placed  - 6/18-19: chest tubes removed  - 6/21: ?PRES on MRI, off Envarsus, switched to Belatacept 6/23  - 6/25: Tx to SICU for refractory seizures  - 7/10: Passed bedside S&S in SICU. Downgraded from SICU to floor.     Interval Events:  - Afebrile, HTNsive overnight to 179/69; was given hydralazine 10 IV X1 and labetalol 10 IV X1.   - Pulled off condom catheter overnight, noted to have small tear over glans.  - AOX3 this AM, lethargic and slow to respond but answers all questions when spoken to in [facility].   - S/p Speech and swallow eval yesterday. Recommended MBS and ENT eval to assess the vocal cords. 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 Vocal cords good. Reflux precautions (no spicy food and caffeine once has diet). Voice rest.  - MBS planned for today  - SCr 1.08  - UA was + and urine cx from 7/13 growing GNRs, starting cefepime today.     Immunosuppression:   Induction:  Thymoglobulin                                        Maintenance:  - Belatacept: 6/23, 7/4 (initial loading).  Re-loaded 7/8 as there was a gap between doses 2/2 seizures. Next dose 7/18  - MMF 1g BID  - Continue Pred 5  - Ongoing monitoring for signs of rejection.    Potential Discharge date: pending clinical improvement    Education:  Medications    Plan of care:  See Below      MEDICATIONS  (STANDING):  amLODIPine   Tablet 10 milliGRAM(s) Oral <User Schedule>  atorvastatin 40 milliGRAM(s) Oral at bedtime  atovaquone  Suspension 1500 milliGRAM(s) Oral <User Schedule>  cefepime   IVPB 1000 milliGRAM(s) IV Intermittent every 24 hours  chlorhexidine 2% Cloths 1 Application(s) Topical <User Schedule>  doxazosin 4 milliGRAM(s) Oral daily  enoxaparin Injectable 60 milliGRAM(s) SubCutaneous every 12 hours  epoetin cortney-epbx (RETACRIT) Injectable 27886 Unit(s) SubCutaneous every 7 days  finasteride 5 milliGRAM(s) Oral daily  fosphenytoin IVPB 50 milliGRAM(s) PE IV Intermittent <User Schedule>  fosphenytoin IVPB 100 milliGRAM(s) PE IV Intermittent <User Schedule>  hydrALAZINE 100 milliGRAM(s) Oral every 8 hours  insulin glargine Injectable (LANTUS) 4 Unit(s) SubCutaneous at bedtime  insulin lispro (ADMELOG) corrective regimen sliding scale   SubCutaneous every 6 hours  levothyroxine Injectable 40 MICROGram(s) IV Push at bedtime  melatonin 6 milliGRAM(s) Oral at bedtime  mycophenolate mofetil Suspension 1000 milliGRAM(s) Enteral Tube <User Schedule>  pantoprazole   Suspension 40 milliGRAM(s) Oral daily  polyethylene glycol 3350 17 Gram(s) Oral daily  predniSONE  Solution 5 milliGRAM(s) Oral daily  valGANciclovir 50 mG/mL Oral Solution 450 milliGRAM(s) Oral daily    MEDICATIONS  (PRN):  acetaminophen    Suspension .. 650 milliGRAM(s) Enteral Tube every 6 hours PRN Mild Pain (1 - 3)      PAST MEDICAL & SURGICAL HISTORY:  Hypertension      Diabetes      Dyslipidemia      CAD (Coronary Artery Disease)  with Stents in 06/2009 and 6/2019, s/p off-pump C3L on 8/13/20      Hypothyroidism      CVA (cerebral vascular accident)  12/13/19 with residual bilateral weakness      Anemia      PEG (percutaneous endoscopic gastrostomy) status  removed July 2020      Intubation of airway performed without difficulty  Dec 2019  CVA c/b status epilepticus requiring intubation and PEG in 12/2019-      ESRD on dialysis  M/W/F      Seizures  after CVA, last seizure was last week 4/07/22      History of insertion of stent into coronary artery bypass graft  2009 and 2019      S/P CABG x 3  off pump C3L on 8/13/20          Vital Signs Last 24 Hrs  T(C): 36.6 (14 Jul 2022 08:53), Max: 37.6 (13 Jul 2022 18:03)  T(F): 97.9 (14 Jul 2022 08:53), Max: 99.7 (13 Jul 2022 18:03)  HR: 70 (14 Jul 2022 08:53) (59 - 75)  BP: 171/77 (14 Jul 2022 08:53) (158/69 - 180/64)  BP(mean): 119 (14 Jul 2022 04:53) (108 - 119)  RR: 20 (14 Jul 2022 08:53) (16 - 20)  SpO2: 100% (14 Jul 2022 08:53) (96% - 100%)    Parameters below as of 14 Jul 2022 08:53  Patient On (Oxygen Delivery Method): room air        I&O's Summary    13 Jul 2022 07:01  -  14 Jul 2022 07:00  --------------------------------------------------------  IN: 1960 mL / OUT: 1550 mL / NET: 410 mL                              9.9    3.94  )-----------( 293      ( 14 Jul 2022 06:32 )             31.2     07-14    135  |  102  |  21  ----------------------------<  112<H>  3.8   |  24  |  1.08    Ca    8.7      14 Jul 2022 06:32  Phos  2.5     07-14  Mg     2.1     07-14    TPro  6.1  /  Alb  2.4<L>  /  TBili  0.2  /  DBili  x   /  AST  18  /  ALT  9<L>  /  AlkPhos  153<H>  07-14          Culture - Urine (collected 07-13-22 @ 00:58)  Source: Clean Catch Clean Catch (Midstream)  Preliminary Report (07-14-22 @ 09:13):    50,000 - 99,000 CFU/mL Gram Negative Rods        REVIEW OF SYSTEMS  ------------------------------------  Gen: +fatigue, No fevers/chills, +weakness  Skin: No rashes  Head/Eyes/Ears/Mouth: No headache; Normal hearing; Normal vision w/o blurriness; No sinus pain/discomfort, sore throat  Respiratory: No dyspnea, cough, wheezing, hemoptysis  CV: No chest pain, PND, orthopnea  GI: No abdominal pain, diarrhea, constipation, nausea, vomiting, melena, hematochezia  : No increased frequency, dysuria, hematuria, nocturia  MSK: No joint pain/swelling; no back pain; no edema  Neuro: No dizziness/lightheadedness,  seizures, numbness, tingling, +weakness  Heme: No easy bruising or bleeding  Endo: No heat/cold intolerance  Psych: No significant nervousness, anxiety, stress, depression  All other systems were reviewed and are negative, except as noted.          PHYSICAL EXAM:  Constitutional: Well developed / well nourished, lethargic   Eyes: Anicteric, PERRLA  ENMT: nc/at  Neck: supple  Respiratory: CTA   Cardiovascular: RRR  Gastrointestinal: Soft, non distended, NT, RLQ incision healed   Genitourinary: voiding via tucker  Extremities: SCD's in place and working bilaterally, 1+ pitting edema in bilateral lower extremities   Vascular: Palpable dp pulses bilaterally  Neurological: AAOx2  Skin: no rashes, ulcerations or lesions  Musculoskeletal: Moving all extremities, global weakness  Psychiatric: calm, follows commands and interacts appropriately

## 2022-07-15 NOTE — PROGRESS NOTE ADULT - PROBLEM SELECTOR PLAN 2
-Switched from CNI to Belatacept due to neurotoxicity (concern for PRES & seizures)  -Belatacept on 6/23 & 7/8, next dose 7/18  -MMF 1g BID  -continue Pred 5  -PPx:  continue PPI, Atovaquone (discontinued bactrim due to hyperkalemia), continue Valcyte 450mg po daily (CMV intermediate risk), restart Nystatin (for oral thrush)

## 2022-07-15 NOTE — PROGRESS NOTE ADULT - SUBJECTIVE AND OBJECTIVE BOX
Follow Up:      Interval History:    REVIEW OF SYSTEMS  [  ] ROS unobtainable because:    [  ] All other systems negative except as noted below    Constitutional:  [ ] fever [ ] chills  [ ] weight loss  [ ] weakness  Skin:  [ ] rash [ ] phlebitis	  Eyes: [ ] icterus [ ] pain  [ ] discharge	  ENMT: [ ] sore throat  [ ] thrush [ ] ulcers [ ] exudates  Respiratory: [ ] dyspnea [ ] hemoptysis [ ] cough [ ] sputum	  Cardiovascular:  [ ] chest pain [ ] palpitations [ ] edema	  Gastrointestinal:  [ ] nausea [ ] vomiting [ ] diarrhea [ ] constipation [ ] pain	  Genitourinary:  [ ] dysuria [ ] frequency [ ] hematuria [ ] discharge [ ] flank pain  [ ] incontinence  Musculoskeletal:  [ ] myalgias [ ] arthralgias [ ] arthritis  [ ] back pain  Neurological:  [ ] headache [ ] seizures  [ ] confusion/altered mental status    Allergies  No Known Allergies        ANTIMICROBIALS:  atovaquone  Suspension 1500 <User Schedule>  ertapenem  IVPB    nystatin    Suspension 665897 four times a day  valGANciclovir 450 daily      OTHER MEDS:  MEDICATIONS  (STANDING):  acetaminophen     Tablet .. 650 every 6 hours PRN  atorvastatin 40 at bedtime  doxazosin 4 daily  enoxaparin Injectable 60 every 12 hours  epoetin cortney-epbx (RETACRIT) Injectable 94929 every 7 days  finasteride 5 daily  hydrALAZINE 100 every 8 hours  insulin glargine Injectable (LANTUS) 4 at bedtime  insulin lispro (ADMELOG) corrective regimen sliding scale  three times a day with meals  insulin lispro (ADMELOG) corrective regimen sliding scale  at bedtime  insulin lispro Injectable (ADMELOG) 1 three times a day before meals  melatonin 6 at bedtime  mycophenolate mofetil Suspension 1000 <User Schedule>  NIFEdipine XL 60 two times a day  pantoprazole   Suspension 40 daily  phenytoin   Capsule 100 <User Schedule>  polyethylene glycol 3350 17 daily  predniSONE  Solution 5 daily      Vital Signs Last 24 Hrs  T(C): 37.1 (15 Jul 2022 17:00), Max: 37.1 (15 Jul 2022 17:00)  T(F): 98.8 (15 Jul 2022 17:00), Max: 98.8 (15 Jul 2022 17:00)  HR: 77 (15 Jul 2022 17:00) (62 - 82)  BP: 170/72 (15 Jul 2022 17:00) (160/71 - 180/80)  BP(mean): 108 (15 Jul 2022 01:12) (108 - 108)  RR: 18 (15 Jul 2022 17:00) (18 - 20)  SpO2: 98% (15 Jul 2022 17:00) (97% - 99%)    Parameters below as of 15 Jul 2022 17:00  Patient On (Oxygen Delivery Method): room air        PHYSICAL EXAMINATION:  General: Alert and Awake, NAD  HEENT: PERRL, EOMI  Neck: Supple  Cardiac: RRR, No M/R/G  Resp: CTAB, No Wh/Rh/Ra  Abdomen: NBS, NT/ND, No HSM, No rigidity or guarding  MSK: No LE edema. No Calf tenderness  : No tucker  Skin: No rashes or lesions. Skin is warm and dry to the touch.   Neuro: Alert and Awake. CN 2-12 Grossly intact. Moves all four extremities spontaneously.  Psych: Calm, Pleasant, Cooperative                          9.8    3.20  )-----------( 303      ( 15 Jul 2022 07:32 )             31.6       07-15    134<L>  |  101  |  21  ----------------------------<  139<H>  4.3   |  24  |  1.18    Ca    8.7      15 Jul 2022 07:42  Phos  2.7     07-15  Mg     1.8     07-15    TPro  6.2  /  Alb  2.6<L>  /  TBili  0.2  /  DBili  x   /  AST  18  /  ALT  8<L>  /  AlkPhos  154<H>  07-15          MICROBIOLOGY:  v  Clean Catch Clean Catch (Midstream)  07-13-22   50,000 - 99,000 CFU/mL Klebsiella pneumoniae ESBL  --  Klebsiella pneumoniae ESBL      .CSF CSF  06-29-22   No growth at 1 week.  --    No polymorphonuclear leukocytes seen  No organisms seen  by cytocentrifuge      .Other Other  06-28-22   No Nocardia isolated  --  --      .Blood Blood-Peripheral  06-27-22   No Growth Final  --  --      Combi-Cath Combi-Cath  06-26-22   Normal Respiratory Rachel present  --    Few polymorphonuclear leukocytes per low power field  Rare Squamous Epithelial Cells per low power field  Rare Gram Positive Rods per oil power field      .Blood Blood-Peripheral  06-26-22   No Growth Final  --  --      .Blood Blood-Peripheral  06-26-22   Growth in anaerobic bottle: Staphylococcus epidermidis  Coag Negative Staphylococcus  Single set isolate, possible contaminant. Contact  Microbiology if susceptibility testing clinically  indicated.  ***Blood Panel PCR results on this specimen are available  approximately 3 hours after the Gram stain result.***  Gram stain, PCR, and/or culture results may not always  correspond due to difference in methodologies.  ************************************************************  This PCR assay was performed by multiplex PCR. This  Assay tests for 66 bacterial and resistance gene targets.  Please refer to the [facility] test directory  at [email or web address] for details.  --  Blood Culture PCR      Catheterized Catheterized  06-26-22   No growth  --  --      .Blood Blood-Peripheral  06-21-22   No Growth Final  --  --      .Blood Blood  06-19-22   No Growth Final  --  --                RADIOLOGY:    <The imaging below has been reviewed and visualized by me independently. Findings as detailed in report below> Follow Up:  Positive UCx    Interval History: afebrile. no acute events.     REVIEW OF SYSTEMS  [  ] ROS unobtainable because:    [ x ] All other systems negative except as noted below    Constitutional:  [ ] fever [ ] chills  [ ] weight loss  [ ] weakness  Skin:  [ ] rash [ ] phlebitis	  Eyes: [ ] icterus [ ] pain  [ ] discharge	  ENMT: [ ] sore throat  [ ] thrush [ ] ulcers [ ] exudates  Respiratory: [ ] dyspnea [ ] hemoptysis [ ] cough [ ] sputum	  Cardiovascular:  [ ] chest pain [ ] palpitations [ ] edema	  Gastrointestinal:  [ ] nausea [ ] vomiting [ ] diarrhea [ ] constipation [ ] pain	  Genitourinary:  [ ] dysuria [ ] frequency [ ] hematuria [ ] discharge [ ] flank pain  [ ] incontinence  Musculoskeletal:  [ ] myalgias [ ] arthralgias [ ] arthritis  [ ] back pain  Neurological:  [ ] headache [ ] seizures  [ ] confusion/altered mental status    Allergies  No Known Allergies        ANTIMICROBIALS:  atovaquone  Suspension 1500 <User Schedule>  ertapenem  IVPB    nystatin    Suspension 803539 four times a day  valGANciclovir 450 daily      OTHER MEDS:  MEDICATIONS  (STANDING):  acetaminophen     Tablet .. 650 every 6 hours PRN  atorvastatin 40 at bedtime  doxazosin 4 daily  enoxaparin Injectable 60 every 12 hours  epoetin cortney-epbx (RETACRIT) Injectable 45373 every 7 days  finasteride 5 daily  hydrALAZINE 100 every 8 hours  insulin glargine Injectable (LANTUS) 4 at bedtime  insulin lispro (ADMELOG) corrective regimen sliding scale  three times a day with meals  insulin lispro (ADMELOG) corrective regimen sliding scale  at bedtime  insulin lispro Injectable (ADMELOG) 1 three times a day before meals  melatonin 6 at bedtime  mycophenolate mofetil Suspension 1000 <User Schedule>  NIFEdipine XL 60 two times a day  pantoprazole   Suspension 40 daily  phenytoin   Capsule 100 <User Schedule>  polyethylene glycol 3350 17 daily  predniSONE  Solution 5 daily      Vital Signs Last 24 Hrs  T(C): 37.1 (15 Jul 2022 17:00), Max: 37.1 (15 Jul 2022 17:00)  T(F): 98.8 (15 Jul 2022 17:00), Max: 98.8 (15 Jul 2022 17:00)  HR: 77 (15 Jul 2022 17:00) (62 - 82)  BP: 170/72 (15 Jul 2022 17:00) (160/71 - 180/80)  BP(mean): 108 (15 Jul 2022 01:12) (108 - 108)  RR: 18 (15 Jul 2022 17:00) (18 - 20)  SpO2: 98% (15 Jul 2022 17:00) (97% - 99%)    Parameters below as of 15 Jul 2022 17:00  Patient On (Oxygen Delivery Method): room air        PHYSICAL EXAMINATION:  General: Alert and Awake, NAD  Cardiac: RRR, No M/R/G  Resp: CTAB, No Wh/Rh/Ra  Abdomen: NBS, NT/ND, No HSM, No rigidity or guarding  MSK: No LE edema. No Calf tenderness  : No tucker  Skin: No rashes or lesions. Skin is warm and dry to the touch.   Neuro: Alert and Awake. CN 2-12 Grossly intact. Moves all four extremities spontaneously.  Psych: Calm, Pleasant, Cooperative                          9.8    3.20  )-----------( 303      ( 15 Jul 2022 07:32 )             31.6       07-15    134<L>  |  101  |  21  ----------------------------<  139<H>  4.3   |  24  |  1.18    Ca    8.7      15 Jul 2022 07:42  Phos  2.7     07-15  Mg     1.8     07-15    TPro  6.2  /  Alb  2.6<L>  /  TBili  0.2  /  DBili  x   /  AST  18  /  ALT  8<L>  /  AlkPhos  154<H>  07-15          MICROBIOLOGY:  v  Clean Catch Clean Catch (Midstream)  07-13-22   50,000 - 99,000 CFU/mL Klebsiella pneumoniae ESBL  --  Klebsiella pneumoniae ESBL      .CSF CSF  06-29-22   No growth at 1 week.  --    No polymorphonuclear leukocytes seen  No organisms seen  by cytocentrifuge      .Other Other  06-28-22   No Nocardia isolated  --  --      .Blood Blood-Peripheral  06-27-22   No Growth Final  --  --      Combi-Cath Combi-Cath  06-26-22   Normal Respiratory Rachel present  --    Few polymorphonuclear leukocytes per low power field  Rare Squamous Epithelial Cells per low power field  Rare Gram Positive Rods per oil power field      .Blood Blood-Peripheral  06-26-22   No Growth Final  --  --      .Blood Blood-Peripheral  06-26-22   Growth in anaerobic bottle: Staphylococcus epidermidis  Coag Negative Staphylococcus  Single set isolate, possible contaminant. Contact  Microbiology if susceptibility testing clinically  indicated.  ***Blood Panel PCR results on this specimen are available  approximately 3 hours after the Gram stain result.***  Gram stain, PCR, and/or culture results may not always  correspond due to difference in methodologies.  ************************************************************  This PCR assay was performed by multiplex PCR. This  Assay tests for 66 bacterial and resistance gene targets.  Please refer to the [facility] test directory  at [email or web address] for details.  --  Blood Culture PCR      Catheterized Catheterized  06-26-22   No growth  --  --      .Blood Blood-Peripheral  06-21-22   No Growth Final  --  --      .Blood Blood  06-19-22   No Growth Final  --  --                RADIOLOGY:    <The imaging below has been reviewed and visualized by me independently. Findings as detailed in report below>    < from: [facility] Duplex Upper Ext Vein Scan, Left (07.10.22 @ 12:26) >  IMPRESSION:  No evidence of left upper extremity deep venous thrombosis.    Left cephalic vein superficial thrombus phlebitis.    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    < end of copied text >

## 2022-07-15 NOTE — PROGRESS NOTE ADULT - ASSESSMENT
66 year old Male with PMHx ESRD s/p permacath removal 5/10/22 s/p Rt DDRT 4/21/22,  CVA (2019), Afib on apixaban, seizure activity, CAD s/p stents, CABG (2020), HTN, HLD, DM on insulin, gastric/duodenal ulcer, recent hospitalization 4/28/22 -5/10/22 with weakness/anemia found to have perinephric hematoma requiring evacuation and repair of bleeding arterial anastomosis who presented to [facility] for status epilepticus requiring intubation for hypoxic respiratory failure    Subsequently developed continued seizures and hypothermia  Was initiated on empiric antibiotics  s/p LP which was not suggestive of infectious process  Blood, urine and sputum cultures without positive sample    U/A (7/12) 161 WBC  UCx (7/13) 50-99K ESBL Klebsiella   No obvious urinary symptoms  Started on Cefepime  Last previous positive urine culture (at rehab prior to admission)  with ESBL organism    #Positive Urine Culture, DDRT Recipient    --Switch to Ertapenem 1g IV Q24H; complete 3 day course    I will follow the patient as needed. Please feel free to contact me with any further questions or concerns.    Carlton Hoover M.D.  [facility] Division of Infectious Disease  8AM-5PM Monday - Friday: Available on [employer] Teams  After Hours and Holidays (or if no response on [employer] Teams): Please contact the Infectious Diseases Office at [phone]     The above assessment and plan were discussed with Saeid transplant surgery PA

## 2022-07-15 NOTE — PROGRESS NOTE ADULT - PROBLEM SELECTOR PLAN 1
-R DCD DDRT (ureter stented) 4/21/22  -Episode of ANA inhouse with peak SCr 2.9; now stable at 1.1 today. Allograft function stable with good UOP  - s/p stent removal on 7/12  -Passed MBS, now on regular with thins. NGT taken out.   - ENT: post extubation trauma/post cricoid edema 2/2 Laryngopharyngeal reflux disease.  Reflux precautions       Status epilepticus- Resolved. encephalopathy initially from post ictal state, now possible ICU delirium. LP -ve. vEEG -ve. Now controlled phenytoin PO. Neuro following.     Hemothorax- resolved. s/p VATS. Now on RA    RIJ thrombus & Afib, c/w lovenox as per discussion with son & will switch to coumadin as outpatient. Decreased efficacy of eliquis with AEDs    HTN: Uncontrolled. On Hydralazine 100mg q8, Doxazosin 4mg. Change norvasc to nifedipine 60 bid for better BP control    Anemia - stable, Continue epo 10,000 weekly    BPH- passed TOV. c/w proscar & doxazosin    Cloudy urine- UA +ve for UTI. Ucx with < 100,000 CFU ESBL klebsiella sensitive to Erta. Afebrile. No leukocytosis. Invanz per ID. Invanz lowers seizure threshold. Caution in this pt with recent hx of status epilepticus.

## 2022-07-15 NOTE — PROGRESS NOTE ADULT - NUTRITIONAL ASSESSMENT
This patient has been assessed with a concern for Malnutrition and has been determined to have a diagnosis/diagnoses of Severe protein-calorie malnutrition.    This patient is being managed with:   Diet Consistent Carbohydrate Renal/No Snacks-  Supplement Feeding Modality:  Oral  Nepro Cans or Servings Per Day:  2       Frequency:  Daily  Entered: Jul 14 2022  7:49PM

## 2022-07-15 NOTE — PROGRESS NOTE ADULT - SUBJECTIVE AND OBJECTIVE BOX
Transplant Surgery Multidisciplinary Progress Note   --------------------------------------------------------------  R DCD DDRT    4/21/22    Present: Patient seen and examined with multidisciplinary team including Transplant Surgeon: Dr. Mann,  Nephrologist: Dr. Nova, Pharmacist: WALDO Lim, Kike Sullivan MS4, unit RN during am rounds.  Disciplines not in attendance will be notified of the plan.     HPI: 66M with PMH: DM type II, HTN, CAD s/p CABG in 2020, AFib on Eliquis, CVA in 2019 due to Afib, Seizure d/o following CVA, last episode was on 4/8/22, h/o GIB in 2/2022 EGD with duodenal ulcer.  ESRD due to DM was on HD since 2019.     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  Donor was 58 , KDPI 82%, DCD, single vessels and ureter, HLA mismatch 1, 2, 2. No DSA, cPRA 0%. 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 Re-admitted in May for anemia in the setting of large perinephric hematoma s/p evacuation and repair of arterial anastomosis on 5/2/22. Intra operative biopsy showed no rejection, Creatinine ranging ~2mg/dL.    In Rehab: He was recently dx with klebsiella UTI rx with ertapenem - then switched to Levaquin day #6.    He also had parainfluenza pneumonia. Was at rehab progressing well.      Hospital course:  - 6/10: transferred from rehab facility for seizure status epilepticus. Intubated for respiratory protection and transferred to MICU.  EEG showed no seizure activity. Neuro consulted.   - 6/11: Extubated. Found to have R pleural effusion. s/p Thoracentesis 1.3L. Eliquis changed to heparin drip.  Post procedure drop in H&H. 5u PRBC, 2 u FFP.    - 6/11 evening: Transferred to SICU for further care in setting of hypoxia, significant R pleural effusion, hgb 6 and hemodynamic instability  - 6/11 Intubated at 9pm and sedated on Precedex.  CT placed, 600ml blood drained.  1L total overnight, started pressors  - 6/11 Received Protamine for PTT >100 (was on Heparin drip started for AF), 2 u FFP and 5u PRBC total  - 6/13 s/p VATS 2.2L old blood removed; second chest tube placed  - 6/18-19: chest tubes removed  - 6/21: ?PRES on MRI, off Envarsus, switched to Belatacept 6/23  - 6/25: Tx to SICU for refractory seizures  - 7/10: Passed bedside S&S in SICU. Downgraded from SICU to floor.     Interval Events:  - Afebrile on Cefepime, VSS  - Making progress daily; overall improvement in mobility, tolerating solids, mentation now AAO-3, calm.  - remains with good graft function, 1.2L UO without tucker, normal Cr ~1    Immunosuppression:   Induction:  Thymoglobulin                                        Maintenance:  - Belatacept: 6/23, 7/4 (initial loading).  Re-loaded 7/8 as there was a gap between doses 2/2 seizures. Next dose 7/18  - MMF 1g BID  - Continue Pred 5  - Ongoing monitoring for signs of rejection.    Potential Discharge date: early next week    Education:  Medications    Plan of care:  See Below      MEDICATIONS  (STANDING):  amLODIPine   Tablet 10 milliGRAM(s) Oral <User Schedule>  atorvastatin 40 milliGRAM(s) Oral at bedtime  atovaquone  Suspension 1500 milliGRAM(s) Oral <User Schedule>  chlorhexidine 2% Cloths 1 Application(s) Topical <User Schedule>  doxazosin 4 milliGRAM(s) Oral daily  enoxaparin Injectable 60 milliGRAM(s) SubCutaneous every 12 hours  epoetin cortney-epbx (RETACRIT) Injectable 80045 Unit(s) SubCutaneous every 7 days  ertapenem  IVPB      finasteride 5 milliGRAM(s) Oral daily  hydrALAZINE 100 milliGRAM(s) Oral every 8 hours  insulin glargine Injectable (LANTUS) 4 Unit(s) SubCutaneous at bedtime  insulin lispro (ADMELOG) corrective regimen sliding scale   SubCutaneous three times a day with meals  insulin lispro (ADMELOG) corrective regimen sliding scale   SubCutaneous at bedtime  levothyroxine Injectable 40 MICROGram(s) IV Push at bedtime  melatonin 6 milliGRAM(s) Oral at bedtime  mycophenolate mofetil Suspension 1000 milliGRAM(s) Oral <User Schedule>  nystatin    Suspension 942731 Unit(s) Oral three times a day  pantoprazole   Suspension 40 milliGRAM(s) Oral daily  phenytoin   Capsule 100 milliGRAM(s) Oral <User Schedule>  polyethylene glycol 3350 17 Gram(s) Oral daily  predniSONE  Solution 5 milliGRAM(s) Oral daily  valGANciclovir 450 milliGRAM(s) Oral daily    MEDICATIONS  (PRN):  acetaminophen     Tablet .. 650 milliGRAM(s) Oral every 6 hours PRN Mild Pain (1 - 3)              Vital Signs Last 24 Hrs  T(C): 36.9 (15 Jul 2022 08:47), Max: 36.9 (15 Jul 2022 08:47)  T(F): 98.5 (15 Jul 2022 08:47), Max: 98.5 (15 Jul 2022 08:47)  HR: 67 (15 Jul 2022 08:47) (62 - 82)  BP: 162/69 (15 Jul 2022 08:47) (145/70 - 180/80)  BP(mean): 108 (15 Jul 2022 01:12) (108 - 108)  RR: 20 (15 Jul 2022 08:47) (18 - 20)  SpO2: 97% (15 Jul 2022 08:47) (97% - 99%)    Parameters below as of 15 Jul 2022 08:47  Patient On (Oxygen Delivery Method): room air        I&O's Summary    14 Jul 2022 07:01  -  15 Jul 2022 07:00  --------------------------------------------------------  IN: 780 mL / OUT: 1205 mL / NET: -425 mL                              9.8    3.20  )-----------( 303      ( 15 Jul 2022 07:32 )             31.6     07-15    134<L>  |  101  |  21  ----------------------------<  139<H>  4.3   |  24  |  1.18    Ca    8.7      15 Jul 2022 07:42  Phos  2.7     07-15  Mg     1.8     07-15    TPro  6.2  /  Alb  2.6<L>  /  TBili  0.2  /  DBili  x   /  AST  18  /  ALT  8<L>  /  AlkPhos  154<H>  07-15          Culture - Urine (collected 07-13-22 @ 00:58)  Source: Clean Catch Clean Catch (Midstream)  Final Report (07-15-22 @ 08:40):    50,000 - 99,000 CFU/mL Klebsiella pneumoniae ESBL  Organism: Klebsiella pneumoniae ESBL (07-15-22 @ 08:40)  Organism: Klebsiella pneumoniae ESBL (07-15-22 @ 08:40)                    REVIEW OF SYSTEMS  ------------------------------------  Gen: +fatigue, No fevers/chills, +weakness  Skin: No rashes  Head/Eyes/Ears/Mouth: No headache; Normal hearing; Normal vision w/o blurriness; No sinus pain/discomfort, sore throat  Respiratory: No dyspnea, cough, wheezing, hemoptysis  CV: No chest pain, PND, orthopnea  GI: No abdominal pain, diarrhea, constipation, nausea, vomiting, melena, hematochezia  : No increased frequency, dysuria, hematuria, nocturia  MSK: No joint pain/swelling; no back pain; no edema  Neuro: No dizziness/lightheadedness,  seizures, numbness, tingling, +weakness  Heme: No easy bruising or bleeding  Endo: No heat/cold intolerance  Psych: No significant nervousness, anxiety, stress, depression  All other systems were reviewed and are negative, except as noted.          PHYSICAL EXAM:  Constitutional: Well developed / well nourished  Eyes: Anicteric, PERRLA  ENMT: nc/at  Neck: supple  Respiratory: CTA   Cardiovascular: RRR  Gastrointestinal: Soft, non distended, NT, RLQ incision healed   Genitourinary: voiding spontaneously  Extremities: SCD's in place and working bilaterally, overall edema improving  Vascular: Palpable dp pulses bilaterally  Neurological: AAOx3  Skin: no rashes, ulcerations or lesions  Musculoskeletal: Moving all extremities, global weakness  Psychiatric: calm, follows commands and interacts appropriately Transplant Surgery Multidisciplinary Progress Note   --------------------------------------------------------------  R DCD DDRT    4/21/22    Present: Patient seen and examined with multidisciplinary team including Transplant Surgeon: Dr. Mann,  Nephrologist: Dr. Nova, Pharmacist: WALDO Lim, Kike Sullivan MS4, unit RN during am rounds.  Disciplines not in attendance will be notified of the plan.     HPI: 66M with PMH: DM type II, HTN, CAD s/p CABG in 2020, AFib on Eliquis, CVA in 2019 due to Afib, Seizure d/o following CVA, last episode was on 4/8/22, h/o GIB in 2/2022 EGD with duodenal ulcer.  ESRD due to DM was on HD since 2019.     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  Donor was 58 , KDPI 82%, DCD, single vessels and ureter, HLA mismatch 1, 2, 2. No DSA, cPRA 0%. 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 Re-admitted in May for anemia in the setting of large perinephric hematoma s/p evacuation and repair of arterial anastomosis on 5/2/22. Intra operative biopsy showed no rejection, Creatinine ranging ~2mg/dL.    In Rehab: He was recently dx with klebsiella UTI rx with ertapenem - then switched to Levaquin day #6.    He also had parainfluenza pneumonia. Was at rehab progressing well.      Hospital course:  - 6/10: transferred from rehab facility for seizure status epilepticus. Intubated for respiratory protection and transferred to MICU.  EEG showed no seizure activity. Neuro consulted.   - 6/11: Extubated. Found to have R pleural effusion. s/p Thoracentesis 1.3L. Eliquis changed to heparin drip.  Post procedure drop in H&H. 5u PRBC, 2 u FFP.    - 6/11 evening: Transferred to SICU for further care in setting of hypoxia, significant R pleural effusion, hgb 6 and hemodynamic instability  - 6/11 Intubated at 9pm and sedated on Precedex.  CT placed, 600ml blood drained.  1L total overnight, started pressors  - 6/11 Received Protamine for PTT >100 (was on Heparin drip started for AF), 2 u FFP and 5u PRBC total  - 6/13 s/p VATS 2.2L old blood removed; second chest tube placed  - 6/18-19: chest tubes removed  - 6/21: ?PRES on MRI, off Envarsus, switched to Belatacept 6/23  - 6/25: Tx to SICU for refractory seizures  - 7/10: Passed bedside S&S in SICU. Downgraded from SICU to floor.     Interval Events:  - Afebrile on Cefepime, VSS  - Making progress daily; overall improvement in mobility, tolerating solids, mentation now AAO-3, calm.  - remains with good graft function, 1.2L UO without tucker, normal Cr ~1    Immunosuppression:   Induction:  Thymoglobulin                                        Maintenance:  - Belatacept: 6/23, 7/4 (initial loading).  Re-loaded 7/8 as there was a gap between doses 2/2 seizures. Next dose 7/18  - MMF 1g BID  - Continue Pred 5  - Ongoing monitoring for signs of rejection.    Potential Discharge date: early next week    Education:  Medications    Plan of care:  See Below      MEDICATIONS  (STANDING):  amLODIPine   Tablet 10 milliGRAM(s) Oral <User Schedule>  atorvastatin 40 milliGRAM(s) Oral at bedtime  atovaquone  Suspension 1500 milliGRAM(s) Oral <User Schedule>  chlorhexidine 2% Cloths 1 Application(s) Topical <User Schedule>  doxazosin 4 milliGRAM(s) Oral daily  enoxaparin Injectable 60 milliGRAM(s) SubCutaneous every 12 hours  epoetin cortney-epbx (RETACRIT) Injectable 08550 Unit(s) SubCutaneous every 7 days  ertapenem  IVPB      finasteride 5 milliGRAM(s) Oral daily  hydrALAZINE 100 milliGRAM(s) Oral every 8 hours  insulin glargine Injectable (LANTUS) 4 Unit(s) SubCutaneous at bedtime  insulin lispro (ADMELOG) corrective regimen sliding scale   SubCutaneous three times a day with meals  insulin lispro (ADMELOG) corrective regimen sliding scale   SubCutaneous at bedtime  levothyroxine Injectable 40 MICROGram(s) IV Push at bedtime  melatonin 6 milliGRAM(s) Oral at bedtime  mycophenolate mofetil Suspension 1000 milliGRAM(s) Oral <User Schedule>  nystatin    Suspension 250454 Unit(s) Oral three times a day  pantoprazole   Suspension 40 milliGRAM(s) Oral daily  phenytoin   Capsule 100 milliGRAM(s) Oral <User Schedule>  polyethylene glycol 3350 17 Gram(s) Oral daily  predniSONE  Solution 5 milliGRAM(s) Oral daily  valGANciclovir 450 milliGRAM(s) Oral daily    MEDICATIONS  (PRN):  acetaminophen     Tablet .. 650 milliGRAM(s) Oral every 6 hours PRN Mild Pain (1 - 3)              Vital Signs Last 24 Hrs  T(C): 36.9 (15 Jul 2022 08:47), Max: 36.9 (15 Jul 2022 08:47)  T(F): 98.5 (15 Jul 2022 08:47), Max: 98.5 (15 Jul 2022 08:47)  HR: 67 (15 Jul 2022 08:47) (62 - 82)  BP: 162/69 (15 Jul 2022 08:47) (145/70 - 180/80)  BP(mean): 108 (15 Jul 2022 01:12) (108 - 108)  RR: 20 (15 Jul 2022 08:47) (18 - 20)  SpO2: 97% (15 Jul 2022 08:47) (97% - 99%)    Parameters below as of 15 Jul 2022 08:47  Patient On (Oxygen Delivery Method): room air        I&O's Summary    14 Jul 2022 07:01  -  15 Jul 2022 07:00  --------------------------------------------------------  IN: 780 mL / OUT: 1205 mL / NET: -425 mL                              9.8    3.20  )-----------( 303      ( 15 Jul 2022 07:32 )             31.6     07-15    134<L>  |  101  |  21  ----------------------------<  139<H>  4.3   |  24  |  1.18    Ca    8.7      15 Jul 2022 07:42  Phos  2.7     07-15  Mg     1.8     07-15    TPro  6.2  /  Alb  2.6<L>  /  TBili  0.2  /  DBili  x   /  AST  18  /  ALT  8<L>  /  AlkPhos  154<H>  07-15          Culture - Urine (collected 07-13-22 @ 00:58)  Source: Clean Catch Clean Catch (Midstream)  Final Report (07-15-22 @ 08:40):    50,000 - 99,000 CFU/mL Klebsiella pneumoniae ESBL  Organism: Klebsiella pneumoniae ESBL (07-15-22 @ 08:40)  Organism: Klebsiella pneumoniae ESBL (07-15-22 @ 08:40)                    REVIEW OF SYSTEMS  ------------------------------------  Gen: +fatigue, No fevers/chills, +weakness  Skin: No rashes  Head/Eyes/Ears/Mouth: No headache; Normal hearing; Normal vision w/o blurriness; No sinus pain/discomfort, sore throat  Respiratory: No dyspnea, cough, wheezing, hemoptysis  CV: No chest pain, PND, orthopnea  GI: No abdominal pain, diarrhea, constipation, nausea, vomiting, melena, hematochezia  : No increased frequency, dysuria, hematuria, nocturia  MSK: No joint pain/swelling; no back pain; no edema  Neuro: No dizziness/lightheadedness,  seizures, numbness, tingling, +weakness  Heme: No easy bruising or bleeding  Endo: No heat/cold intolerance  Psych: No significant nervousness, anxiety, stress, depression  All other systems were reviewed and are negative, except as noted.          PHYSICAL EXAM:  Constitutional: Well developed / well nourished  Eyes: Anicteric, PERRLA  ENMT: nc/at, +oral thrush  Neck: supple  Respiratory: CTA   Cardiovascular: RRR  Gastrointestinal: Soft, non distended, NT, RLQ incision healed   Genitourinary: voiding spontaneously  Extremities: SCD's in place and working bilaterally, overall edema improving  Vascular: Palpable dp pulses bilaterally  Neurological: AAOx3  Skin: no rashes, ulcerations or lesions  Musculoskeletal: Moving all extremities, global weakness  Psychiatric: calm, follows commands and interacts appropriately

## 2022-07-15 NOTE — PROGRESS NOTE ADULT - ASSESSMENT
66M with h/o DM type II, HTN, CAD s/p CABG in 2020, Afib on Eliquis, CVA in 2019 due to Afib, Seizure d/o (last episode was on 4/8/22), h/o GI bleed in 2/2022 EGD with duodenal ulcer and ESRD on HD s/p R DDRT from DCD donor on 4/21/22 complicated by DGF requiring HD until 4/29/22 now with good graft function who presented with status epilepticus in setting of UTI/antibiotics and sub-therapeutic valproic acid level     Seizure Disorder:  - Repeat MRI head 6/27 with less concerns for PRES, likely ischemic changes  - Remains seizure free  - Antiepileptic regimen per Neurology, currently on Phenytoin 100BID  - Keep Mag > 2    R DCD DDRT (ureter stented) 4/21/22:  - Excellent graft function  - Klebsiella UTI (post stent removal): will start Ertapenem x 3D per Transplant ID.  Cleared by Neuro/Pharmacy  - S/P removal of ureteral stent on 7/12    - Strict I/Os, Continue Doxazosin/Proscar. 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 Vocal cords good. Reflux precautions (no spicy food and caffeine once has diet). Voice rest.  - OOB with RN/PT    Immunosuppression:   - Belatacept: 6/23, 7/4 (initial loading).  Re-loaded 7/8 as there was a gap between doses 2/2 seizures. next dose 7/18  - MMF 1g BID  - continue Pred 5  - PPx:  Mepron/Valcyte/PPI    DM  - on Lantus/Lispro, Endocrine following     AFib:  - continue Retacrit weekly  - Eliquis with ~40% less efficacy while on fosphenytoin.  On Lovenox 60BID. Plan to continue switch to Coumadin once discharged home from rehab. Plan discussed with neurology and family  - rate controlled    R IJ DVT  - On Lovenox 60BID    HTN:  - switch from Norvasc-->Nifedipine./Hydralazine/Cardura    DISPO  -OK next week  -to f/u with Dr. Andre Patterson, Epilepsy team upon d/c     66M with h/o DM type II, HTN, CAD s/p CABG in 2020, Afib on Eliquis, CVA in 2019 due to Afib, Seizure d/o (last episode was on 4/8/22), h/o GI bleed in 2/2022 EGD with duodenal ulcer and ESRD on HD s/p R DDRT from DCD donor on 4/21/22 complicated by DGF requiring HD until 4/29/22 now with good graft function who presented with status epilepticus in setting of UTI/antibiotics and sub-therapeutic valproic acid level     Seizure Disorder:  - Repeat MRI head 6/27 with less concerns for PRES, likely ischemic changes  - Remains seizure free  - Antiepileptic regimen per Neurology, currently on Phenytoin 100BID  - Keep Mag > 2    R DCD DDRT (ureter stented) 4/21/22:  - Excellent graft function  - Klebsiella UTI (post stent removal): will start Ertapenem x 3D per Transplant ID.  Cleared by Neuro/Pharmacy  - S/P removal of ureteral stent on 7/12    - Strict I/Os, Continue Doxazosin/Proscar. 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 Vocal cords good. Reflux precautions (no spicy food and caffeine once has diet). Voice rest.  - OOB with RN/PT    Immunosuppression:   - Belatacept: 6/23, 7/4 (initial loading).  Re-loaded 7/8 as there was a gap between doses 2/2 seizures. next dose 7/18  - MMF 1g BID  - continue Pred 5  - PPx:  Mepron/Valcyte/PPI, restart Nystatin (oral thrush, recent prolonged NGT requirement)    DM  - on Lantus/Lispro, Endocrine following     AFib:  - continue Retacrit weekly  - Eliquis with ~40% less efficacy while on fosphenytoin.  On Lovenox 60BID. Plan to continue switch to Coumadin once discharged home from rehab. Plan discussed with neurology and family  - rate controlled    R IJ DVT  - On Lovenox 60BID    HTN:  - switch from Norvasc-->Nifedipine./Hydralazine/Cardura    DISPO  -OK next week  -to f/u with Dr. Andre Patterson, Epilepsy team upon d/c

## 2022-07-15 NOTE — PROGRESS NOTE ADULT - SUBJECTIVE AND OBJECTIVE BOX
Chief Complaint/Follow-up on: DM    Subjective:    POC blood glucose and insulin use reviewed.  pt is feeling okay  states he is eating   he is needing 1-2 units correction    MEDICATIONS  (STANDING):  atorvastatin 40 milliGRAM(s) Oral at bedtime  atovaquone  Suspension 1500 milliGRAM(s) Oral <User Schedule>  chlorhexidine 2% Cloths 1 Application(s) Topical <User Schedule>  doxazosin 4 milliGRAM(s) Oral daily  enoxaparin Injectable 60 milliGRAM(s) SubCutaneous every 12 hours  epoetin cortney-epbx (RETACRIT) Injectable 46670 Unit(s) SubCutaneous every 7 days  ertapenem  IVPB      finasteride 5 milliGRAM(s) Oral daily  hydrALAZINE 100 milliGRAM(s) Oral every 8 hours  insulin glargine Injectable (LANTUS) 4 Unit(s) SubCutaneous at bedtime  insulin lispro (ADMELOG) corrective regimen sliding scale   SubCutaneous three times a day with meals  insulin lispro (ADMELOG) corrective regimen sliding scale   SubCutaneous at bedtime  levothyroxine Injectable 40 MICROGram(s) IV Push at bedtime  melatonin 6 milliGRAM(s) Oral at bedtime  mycophenolate mofetil Suspension 1000 milliGRAM(s) Oral <User Schedule>  NIFEdipine XL 60 milliGRAM(s) Oral two times a day  nystatin    Suspension 028220 Unit(s) Oral three times a day  pantoprazole   Suspension 40 milliGRAM(s) Oral daily  phenytoin   Capsule 100 milliGRAM(s) Oral <User Schedule>  polyethylene glycol 3350 17 Gram(s) Oral daily  predniSONE  Solution 5 milliGRAM(s) Oral daily  valGANciclovir 450 milliGRAM(s) Oral daily    MEDICATIONS  (PRN):  acetaminophen     Tablet .. 650 milliGRAM(s) Oral every 6 hours PRN Mild Pain (1 - 3)      PHYSICAL EXAM:  VITALS: T(C): 36.6 (07-15-22 @ 14:00)  T(F): 97.8 (07-15-22 @ 14:00), Max: 98.5 (07-15-22 @ 08:47)  HR: 74 (07-15-22 @ 14:00) (62 - 82)  BP: 168/78 (07-15-22 @ 14:00) (145/70 - 180/80)  RR:  (18 - 20)  SpO2:  (97% - 99%)  Wt(kg): --  GENERAL: NAD, well-groomed, well-developed  RESPIRATORY: Clear to auscultation bilaterally; No rales, rhonchi, wheezing, or rubs  CARDIOVASCULAR: Regular rate and rhythm; No murmurs; no peripheral edema  MSK: moving all ext   psych : awake and alert and mentating     POCT Blood Glucose.: 222 mg/dL (07-15-22 @ 13:13)  POCT Blood Glucose.: 130 mg/dL (07-15-22 @ 08:24)  POCT Blood Glucose.: 204 mg/dL (07-14-22 @ 21:20)  POCT Blood Glucose.: 208 mg/dL (07-14-22 @ 17:22)  POCT Blood Glucose.: 187 mg/dL (07-14-22 @ 12:00)  POCT Blood Glucose.: 121 mg/dL (07-14-22 @ 06:10)  POCT Blood Glucose.: 161 mg/dL (07-14-22 @ 00:09)  POCT Blood Glucose.: 174 mg/dL (07-13-22 @ 21:09)  POCT Blood Glucose.: 207 mg/dL (07-13-22 @ 18:27)  POCT Blood Glucose.: 230 mg/dL (07-13-22 @ 12:23)  POCT Blood Glucose.: 158 mg/dL (07-13-22 @ 05:48)  POCT Blood Glucose.: 178 mg/dL (07-13-22 @ 01:35)  POCT Blood Glucose.: 126 mg/dL (07-12-22 @ 18:17)    07-15    134<L>  |  101  |  21  ----------------------------<  139<H>  4.3   |  24  |  1.18    eGFR: 68    Ca    8.7      07-15  Mg     1.8     07-15  Phos  2.7     07-15    TPro  6.2  /  Alb  2.6<L>  /  TBili  0.2  /  DBili  x   /  AST  18  /  ALT  8<L>  /  AlkPhos  154<H>  07-15          Thyroid Function Tests:  07-06 @ 18:36 TSH 4.69 FreeT4 -- T3 43 Anti TPO -- Anti Thyroglobulin Ab -- TSI --

## 2022-07-15 NOTE — PROGRESS NOTE ADULT - SUBJECTIVE AND OBJECTIVE BOX
[facility]   MD NINA MASON MD, PA KRISTINE SOLTANPOUR, DO INJUNG KO, NP    TEL:  OFFICE: [phone]    From 5pm-7am Answering Service [phone]    -- RENAL FOLLOW UP NOTE ---Date of Service 07-15-22 @ 13:16    Patient is a 66y old  Male who presents with a chief complaint of Status Epilepticus (15 Jul 2022 12:30)      Patient seen and examined at bedside. No chest pain/sob    VITALS:  T(F): 98.5 (07-15-22 @ 08:47), Max: 98.5 (07-15-22 @ 08:47)  HR: 67 (07-15-22 @ 08:47)  BP: 162/69 (07-15-22 @ 08:47)  RR: 20 (07-15-22 @ 08:47)  SpO2: 97% (07-15-22 @ 08:47)  Wt(kg): --    07-14 @ 07:01  -  07-15 @ 07:00  --------------------------------------------------------  IN: 780 mL / OUT: 1205 mL / NET: -425 mL          PHYSICAL EXAM:  Constitutional: NAD  Neck: No JVD  Respiratory: CTAB, no wheezes, rales or rhonchi  Cardiovascular: S1, S2, RRR  Gastrointestinal: BS+, soft, NT/ND  Extremities: No peripheral edema    Hospital Medications:   MEDICATIONS  (STANDING):  amLODIPine   Tablet 10 milliGRAM(s) Oral <User Schedule>  atorvastatin 40 milliGRAM(s) Oral at bedtime  atovaquone  Suspension 1500 milliGRAM(s) Oral <User Schedule>  chlorhexidine 2% Cloths 1 Application(s) Topical <User Schedule>  doxazosin 4 milliGRAM(s) Oral daily  enoxaparin Injectable 60 milliGRAM(s) SubCutaneous every 12 hours  epoetin cortney-epbx (RETACRIT) Injectable 32434 Unit(s) SubCutaneous every 7 days  ertapenem  IVPB      finasteride 5 milliGRAM(s) Oral daily  hydrALAZINE 100 milliGRAM(s) Oral every 8 hours  insulin glargine Injectable (LANTUS) 4 Unit(s) SubCutaneous at bedtime  insulin lispro (ADMELOG) corrective regimen sliding scale   SubCutaneous three times a day with meals  insulin lispro (ADMELOG) corrective regimen sliding scale   SubCutaneous at bedtime  levothyroxine Injectable 40 MICROGram(s) IV Push at bedtime  melatonin 6 milliGRAM(s) Oral at bedtime  mycophenolate mofetil Suspension 1000 milliGRAM(s) Oral <User Schedule>  nystatin    Suspension 259750 Unit(s) Oral three times a day  pantoprazole   Suspension 40 milliGRAM(s) Oral daily  phenytoin   Capsule 100 milliGRAM(s) Oral <User Schedule>  polyethylene glycol 3350 17 Gram(s) Oral daily  predniSONE  Solution 5 milliGRAM(s) Oral daily  valGANciclovir 450 milliGRAM(s) Oral daily      LABS:  07-15    134<L>  |  101  |  21  ----------------------------<  139<H>  4.3   |  24  |  1.18    Ca    8.7      15 Jul 2022 07:42  Phos  2.7     07-15  Mg     1.8     07-15    TPro  6.2  /  Alb  2.6<L>  /  TBili  0.2  /  DBili      /  AST  18  /  ALT  8<L>  /  AlkPhos  154<H>  07-15    Creatinine Trend: 1.18 <--, 1.08 <--, 1.10 <--, 1.16 <--, 1.15 <--, 1.16 <--, 1.17 <--, 1.25 <--, 1.25 <--, 1.34 <--    Albumin, Serum: 2.6 g/dL (07-15 @ 07:42)  Phosphorus Level, Serum: 2.7 mg/dL (07-15 @ 07:42)                              9.8    3.20  )-----------( 303      ( 15 Jul 2022 07:32 )             31.6     Urine Studies:  Urinalysis - [07-12-22 @ 23:55]      Color Yellow / Appearance Slightly Turbid / SG 1.020 / pH 6.0      Gluc Trace / Ketone Negative  / Bili Negative / Urobili Negative       Blood Negative / Protein 30 mg/dL / Leuk Est Large / Nitrite Positive      RBC 6 /  / Hyaline 11 / Gran  / Sq Epi  / Non Sq Epi 1 / Bacteria Negative      Iron 17, TIBC 171, %sat 10      [05-02-22 @ 13:03]  Ferritin 1505      [05-02-22 @ 13:05]  PTH -- (Ca 9.2)      [02-05-22 @ 10:13]   294  HbA1c 6.0      [02-21-20 @ 08:53]  TSH 4.69      [07-06-22 @ 18:36]      Syphilis Screen (Treponema Pallidum Ab) Negative      [06-27-22 @ 02:00]    RADIOLOGY & ADDITIONAL STUDIES:

## 2022-07-15 NOTE — PROVIDER CONTACT NOTE (OTHER) - ASSESSMENT
Pt. A&Ox3 forgetful at times, VS as charted, /79. Pt. symptomatic, denies c/p, dizziness, blurry vision. Pt. A&Ox3 forgetful at times, VS as charted, /79. Pt. asymptomatic, denies c/p, dizziness, blurry vision.

## 2022-07-15 NOTE — PROGRESS NOTE ADULT - NS ATTEND AMEND GEN_ALL_CORE FT
s/p DDRT with complicated post-op course, continues to improve slowly  passed modified swallow test, now tolerating PO  Encouraged to eat small, more frequent meals  Immunosuppression - transitioned to Belatacept. Cont prednisone 5mg daily, cellcept 1gm bid  Prophylaxis with mepron, valcyte, nystatin  PT/OT  plan for discharge to subacute rehab  seizure prophylaxis as per neurology

## 2022-07-15 NOTE — PROGRESS NOTE ADULT - NUTRITIONAL ASSESSMENT
Passed MBS, now on regular with thins. NGT taken out. Tolerated pills with water this AM. Mentation much improved. UOP 1.2L in 24 hrs. -180's

## 2022-07-15 NOTE — PROGRESS NOTE ADULT - ASSESSMENT
66 yr old Male with PMHx ESRD s/p permacath removal 5/10/22 s/p Rt DDRT 4/21/22,  CVA (2019), Afib on apixaban, seizure activity, CAD s/p stents, CABG (2020), HTN, HLD, DM on insulin, gastric/duodenal ulcer, recent hospitalization 4/28/22 -5/10/22 with weakness/anemia found to have perinephric hematoma requiring evacuation and repair of bleeding arterial anastomosis. Curently being treated for UTI with Levaquin Today after developing multiple sz episodes refractory to 5 mg versed IM (EMS) and 2 mg ativan IV in E.D. concerning for status epilepticus requiring intubation for hypoxic respiratory  failure. MICU Consult was called for hypoxic respiratory failure secondary to status epilepticus.  Nephrology consulted for renal failure.     A/P  DDRT on 04/21/22  Course complicated by DGF, was on HD until 4/29/22. Readmitted in May for anemia in the setting of large perinephric hematoma s/p evacuation and repair of arterial anastomosis on 5/2/22. Intra operative biopsy showed no rejection.  ANA likely sec to  hemodynamic change   stent removal 07/012/22  scr remains stable  monitor BMP, U/O     Hyperkalemia   resolved   monitor K closely     HTN   suboptimal   manage by transplant team    monitor BP closely     Immunosuppression  continue per transplant team

## 2022-07-15 NOTE — PROGRESS NOTE ADULT - SUBJECTIVE AND OBJECTIVE BOX
[facility] DIVISION OF KIDNEY DISEASES AND HYPERTENSION -- FOLLOW UP NOTE  --------------------------------------------------------------------------------    HPI: 67 y/o male w/ a PMHx of CAD s/p CABG (2020), AF on Eliquis c/b CVA (2019) c/b seizures, HTN, HLD, DM type II, hypothyroidism, GI bleed due to a duodenal ulcer (2/2022), and ESRD s/p DDRT (4/21/22) c/b DGF requiring HD (last session 4/29/22) & large perinephric hematoma s/p evacuation w/ revision of arterial anastomosis (5/2/22) who presented on 6/10/22 for status epilepticus. Patient was intubated for airway protection. AEDs were adjusted and he was successfully extubated on 6/11. Patient was noted to have a large right pleural effusion so thoracentesis was performed w/ 1.3 L of serosanguineous fluid drained. Patient was started on a heparin infusion for his atrial fibrillation post-procedure but had a H&H drop w/ CXR concerning for a hemothorax. Patient was transferred to SICU for further management. He was intubated for airway protection & was taken to the OR on 6/15 for a right VATS for hematoma washout and placement of a second chest tube. He was transferred to the floors on 6/17. Both chest tubes were removed on 6/18. He went into status epilepticus again on 6/25 so he was readmitted to SICU. He was intubated for airway protection again on 6/26 and successfully extubated on 6/28. Hospital course has also been significant for delirium, dysphagia requiring NGT placement for enteral access, hyperkalemia.  ?PRES on MRI, off Envarsus, switched to Belatacept 6/23. Last dose of Renee given on 7/8. AED meds changed per neurology. Mental status improving.  s/p ureteral stent removal on 7/12       24 hour events/subjective: Pateint seen & examined. Vitals/ labs/ medications reviewed. Afebrile.  VSS. Passed MBS, now on regular with thins. NGT taken out. Tolerated pills with water this AM. Mentation much improved. UOP 1.2L in 24 hrs. -180's            PAST HISTORY  --------------------------------------------------------------------------------  No significant changes to PMH, PSH, FHx, SHx, unless otherwise noted    ALLERGIES & MEDICATIONS  --------------------------------------------------------------------------------  Allergies    No Known Allergies    Intolerances      Standing Inpatient Medications  atorvastatin 40 milliGRAM(s) Oral at bedtime  atovaquone  Suspension 1500 milliGRAM(s) Oral <User Schedule>  chlorhexidine 2% Cloths 1 Application(s) Topical <User Schedule>  doxazosin 4 milliGRAM(s) Oral daily  enoxaparin Injectable 60 milliGRAM(s) SubCutaneous every 12 hours  epoetin cortney-epbx (RETACRIT) Injectable 06722 Unit(s) SubCutaneous every 7 days  ertapenem  IVPB      finasteride 5 milliGRAM(s) Oral daily  hydrALAZINE 100 milliGRAM(s) Oral every 8 hours  insulin glargine Injectable (LANTUS) 4 Unit(s) SubCutaneous at bedtime  insulin lispro (ADMELOG) corrective regimen sliding scale   SubCutaneous three times a day with meals  insulin lispro (ADMELOG) corrective regimen sliding scale   SubCutaneous at bedtime  insulin lispro Injectable (ADMELOG) 1 Unit(s) SubCutaneous three times a day before meals  melatonin 6 milliGRAM(s) Oral at bedtime  mycophenolate mofetil Suspension 1000 milliGRAM(s) Oral <User Schedule>  NIFEdipine XL 60 milliGRAM(s) Oral two times a day  nystatin    Suspension 831611 Unit(s) Oral three times a day  pantoprazole   Suspension 40 milliGRAM(s) Oral daily  phenytoin   Capsule 100 milliGRAM(s) Oral <User Schedule>  polyethylene glycol 3350 17 Gram(s) Oral daily  predniSONE  Solution 5 milliGRAM(s) Oral daily  valGANciclovir 450 milliGRAM(s) Oral daily    PRN Inpatient Medications  acetaminophen     Tablet .. 650 milliGRAM(s) Oral every 6 hours PRN      REVIEW OF SYSTEMS  --------------------------------------------------------------------------------  Gen: No fatigue, fevers/chills, weakness  Head/Eyes/Ears/Mouth: No headache  Respiratory: No dyspnea, cough,   CV: No chest pain  GI: No abdominal pain, diarrhea, constipation, nausea, vomiting  Transplant: No pain  : No increased frequency, dysuria, hematuria  MSK:  no edema  Neuro: No dizziness/lightheadedness      All other systems were reviewed and are negative, except as noted.    VITALS/PHYSICAL EXAM  --------------------------------------------------------------------------------  T(C): 36.6 (07-15-22 @ 14:00), Max: 36.9 (07-15-22 @ 08:47)  HR: 74 (07-15-22 @ 14:00) (62 - 82)  BP: 168/78 (07-15-22 @ 14:00) (145/70 - 180/80)  RR: 20 (07-15-22 @ 14:00) (18 - 20)  SpO2: 98% (07-15-22 @ 14:00) (97% - 99%)  Wt(kg): --        07-14-22 @ 07:01  -  07-15-22 @ 07:00  --------------------------------------------------------  IN: 780 mL / OUT: 1205 mL / NET: -425 mL    07-15-22 @ 07:01  -  07-15-22 @ 15:27  --------------------------------------------------------  IN: 250 mL / OUT: 300 mL / NET: -50 mL      Physical Exam:  	Gen: NAD  	HEENT: Anicteric, no JVD  	Pulm: CTA B/L  	CV: RRR, S1S2  	Abd: +BS, soft, nontender/nondistended          Extremities: no LE edema ; RUE edema improved. + LUE edema           Neuro: AAO x 3   	Skin: Warm, without rashes  	Vascular access:              Glans with small tear now improving, no active bleeding          Transplant: No tenderness, swelling        LABS/STUDIES  --------------------------------------------------------------------------------              9.8    3.20  >-----------<  303      [07-15-22 @ 07:32]              31.6     134  |  101  |  21  ----------------------------<  139      [07-15-22 @ 07:42]  4.3   |  24  |  1.18        Ca     8.7     [07-15-22 @ 07:42]      Mg     1.8     [07-15-22 @ 07:42]      Phos  2.7     [07-15-22 @ 07:42]    TPro  6.2  /  Alb  2.6  /  TBili  0.2  /  DBili  x   /  AST  18  /  ALT  8   /  AlkPhos  154  [07-15-22 @ 07:42]    PT/INR: PT 12.0 , INR 1.03       [07-15-22 @ 07:43]  PTT: 39.5       [07-15-22 @ 07:43]      Creatinine Trend:  SCr 1.18 [07-15 @ 07:42]  SCr 1.08 [07-14 @ 06:32]  SCr 1.10 [07-13 @ 06:26]  SCr 1.16 [07-12 @ 05:53]  SCr 1.15 [07-11 @ 06:51]              Urinalysis - [07-12-22 @ 23:55]      Color Yellow / Appearance Slightly Turbid / SG 1.020 / pH 6.0      Gluc Trace / Ketone Negative  / Bili Negative / Urobili Negative       Blood Negative / Protein 30 mg/dL / Leuk Est Large / Nitrite Positive      RBC 6 /  / Hyaline 11 / Gran  / Sq Epi  / Non Sq Epi 1 / Bacteria Negative      Iron 17, TIBC 171, %sat 10      [05-02-22 @ 13:03]  Ferritin 1505      [05-02-22 @ 13:05]  PTH -- (Ca 9.2)      [02-05-22 @ 10:13]   294  HbA1c 6.0      [02-21-20 @ 08:53]  TSH 4.69      [07-06-22 @ 18:36]      Syphilis Screen (Treponema Pallidum Ab) Negative      [06-27-22 @ 02:00]

## 2022-07-15 NOTE — PROGRESS NOTE ADULT - ASSESSMENT
66 yr old M with Type 2 DM> unknown control due to skewed A1C 6.6% secondary to chronic anemia and s/p blood tx. On basal/bolus insulin therapy PTA. DM c/b ESRD s/p DDRT on 3/21, CVA, CAD s/p CABG, Afib on apixaban, seizure activity, HTN, HLD, h/o GI bleed in 2/2022 EGD with duodenal ulcer, discharged to [facility] rehab center after prior hospitalization, now re-admitted from [facility] for seizures c/f status epilepticus in setting of UTI. endocrine consulted for steroid induced hyperglycemia, now on home dose prednisone 5mg. Prolonged hospital course w/ ICU stay, previously intubated/extubated, previous seizures. S/p ureteral stent removal 7/12/22.   pt is now off tube feeds and tolerating PO diet       1. DM  -Test BG AC/HS  -c/w Lantus 4 units QHS for now.   -add in ademelog 1 unit premeals as he is needing 1-2 units premeals   -low Admelog to low correction scale AC meals and Low HS scale  -Please inform endo team of any changes on steroid therapy or PO/TF status  -RD follow up appreciated-change to cons carb diet w/2 cans of nepro daily  -Will follow and adjust as needed        Discharge  - Likely discharge on basal bolus insulin, Plan TBD based on insulin requirements at DC.   Outpatient f/u with Endocrinologist Dr. Lincoln. [address]. Phone  Needs apt at time of discharge  -Needs optho/renal/cardiac f/u as out pt  -Make sure pt has insulin sand DM supplies at time of discharge.          2. htn  BP goal in DM   being maganed by primary team   currently not on any BP agents  outpt urine mc.cr ratio to be done         3. hypoThyroidism    -Currently on levothyroxine Injectable 40 MICROGram(s) IV Push at bedtime  -now that the pt is taking PO would would reccomend to restart LT4 50 mcg daily on an empty stomach at least 1 hour apart from food or PO meds  - monitor TFTs outpatient in 4 weeks after dc with Dr. Salazar Stern MD  Attending Physician   Department of Endocrinology, Diabetes and Metabolism     Pager  [phone] [please provide 10 digit call back number]  RUFINA 9-5  [email or web address]  Nights and weekends: [phone]  Please note that this patient may be followed by a different provider tomorrow.   If no answer or after hours, please contact [phone].  For final dc reccomendations, please call [phone] or page the endocrine fellow on call.    -I spent a total time of 25 mins with the patient at bedside/on unit of which more than 50% of time was spent on counseling/coordination of care.    High risk patient with high level decision making, at high risk of worsening microvascular and macrovascular complications.    Endocrine team consulted for uncontrolled diabetes. Patient is high risk with high level decision making due to uncontrolled diabetes which places patient at high risk for cardiovascular and cerebrovascular events. Patient with lability of glucose requiring close monitoring and insulin adjustments.

## 2022-07-15 NOTE — PROGRESS NOTE ADULT - PROBLEM SELECTOR PLAN 3
likely from bactrim  Resolved            If you have any questions, please feel free to contact me  Steffi Cali  Nephrology Fellow  Pager [place]: [phone]/ [facility]: [phone]    (After 5 pm or on weekends please page the on-call fellow, can check [email or web address] for schedule. Login is jesus [email or web address], schedule under [facility] medicine, psych, derm)

## 2022-07-15 NOTE — PROGRESS NOTE ADULT - ATTENDING COMMENTS
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   PMH: DM type II, HTN, CAD s/p CABG in 2020, Afib on Eliquis, CVA in 2019 due to Afib, Seizure d/o following CVA, h/o GI bleed in 2/2022 EGD with duodenal ulcer.  Donor was 58 , KDPI 82%, DCD, single vessels and ureter, HLA mismatch 1, 2, 2. No DSA, cPRA 0%. CMV +/+    Hospitalized May 2022 for anemia in the setting of large perinephric hematoma s/p evacuation and repair of arterial anastomosis on 5/2/22. Intra operative biopsy showed no rejection.  H/o seizure d/o with last seizure episode prior to transplant was on 4/8/22. He is readmitted for status epilepticus in the setting of sub therapeutic valproic acid levels. Course complicated by respiratory failure, hemothorax and hemorrhagic shock following right thoracentesis done on 6/10 for effusion. S/p PRBC x 5 units and vasopressors, chest tube, followed by VATS.    Envarsus discontinued and started on belatacept due to concern for PRES on head imaging - repeat MRI head not consistent with PRES. Shagufta function is stable, mental status is slowly improving. He is on fosphenytoin with good control of seizures.     Transplant ureteric stent removed 7/12.   Passed modified barium swallow. On regular diet.     1. S/p DCD DDRT on 4/21/22  - creatinine stable at 1mg/dL. Electrolytes and volume status fair.     2. Immunosuppression - Envarsus discontinued for possible neurotoxicity. Continue Belatacept, next dose on 7/18      Continue Cellcept 1000 mg  bid     Prednisone 5mg po daily      Infection prophylaxis - continue atovaquone (bactrim d/darren for hyperkalemia), continue Valcyte 450mg po daily (CMV intermediate risk)    3. Seizure d/o -admitted with status epilepticus . Now controlled on fosphenytoin.      4. HTN:  BP remains elevated on current regimen Hydralazine 100mg q8, Amlodipine 10mg po daily, Doxazosin 4mg. Change Amlodipine to Nifedipine 60mg po daily     5. BPH with urine retention - continue Proscar and doxazosin    6. Anemia - stable, Continue epo 10,000 weekly     7. Atrial fibrillation - was on Eliquis, now on therapeutic Lovenox due to drug interaction with fosphenytoin. Family comfortable with continuing with Lovenox for now rather than coumadin.     8. Gram negative UTI -  Cefepime x 3 days per ID     9. Dysphagia due to pharyngeal edema from recent intubation - barium swallow normal, tolerating regular diet.     D/c planning to sub acute rehab.

## 2022-07-16 NOTE — PROGRESS NOTE ADULT - NS ATTEND AMEND GEN_ALL_CORE FT
s/p DDRT with complicated post-op course, continues to improve slowly  passed modified swallow test, now tolerating PO  Encouraged to eat small, more frequent meals  Immunosuppression - transitioned to Belatacept. Cont prednisone 5mg daily, cellcept 1gm bid  Prophylaxis with mepron, valcyte, nystatin  Cont abx as per ID for UTI, ESBL EColi currently on ertapenem  HTN controlled with nifedipine, hydralazine, cardura  PT/OT  plan for discharge to subacute rehab  seizure prophylaxis as per neurology

## 2022-07-16 NOTE — PROGRESS NOTE ADULT - SUBJECTIVE AND OBJECTIVE BOX
[facility] DIVISION OF KIDNEY DISEASES AND HYPERTENSION -- FOLLOW UP NOTE  --------------------------------------------------------------------------------  Authored by: Ozzie Nova   Cell # [phone]     CHAD DUNBAR was seen and examined at bedside.   Patient is a 66y old  Male who presents with a chief complaint of Status Epilepticus (07-16-22)    24 hour events/subjective: No major events. Afebrile. Urine culture with ESBL E.coli, cefepime changed to ertapenem.   He feels well. Eating well. -160, Norvasc changed to Nifedipine yesterday       Standing Inpatient Medications  atorvastatin 40 milliGRAM(s) Oral at bedtime  doxazosin 4 milliGRAM(s) Oral daily  enoxaparin Injectable 60 milliGRAM(s) SubCutaneous every 12 hours  epoetin cortney-epbx (RETACRIT) Injectable 78136 Unit(s) SubCutaneous every 7 days  ertapenem  IVPB 1000 milliGRAM(s) IV Intermittent every 24 hours  finasteride 5 milliGRAM(s) Oral daily  hydrALAZINE 100 milliGRAM(s) Oral every 8 hours  insulin glargine Injectable (LANTUS) 4 Unit(s) SubCutaneous at bedtime  insulin lispro (ADMELOG) corrective regimen sliding scale   SubCutaneous three times a day with meals  insulin lispro (ADMELOG) corrective regimen sliding scale   SubCutaneous at bedtime  insulin lispro Injectable (ADMELOG) 1 Unit(s) SubCutaneous three times a day before meals  levothyroxine 50 MICROGram(s) Oral daily  magnesium sulfate  IVPB 1 Gram(s) IV Intermittent once  melatonin 6 milliGRAM(s) Oral at bedtime  mycophenolate mofetil Suspension 500 milliGRAM(s) Oral <User Schedule>  NIFEdipine XL 60 milliGRAM(s) Oral two times a day  nystatin    Suspension 323275 Unit(s) Oral four times a day  pantoprazole   Suspension 40 milliGRAM(s) Oral daily  phenytoin   Capsule 100 milliGRAM(s) Oral <User Schedule>  polyethylene glycol 3350 17 Gram(s) Oral daily  predniSONE  Solution 5 milliGRAM(s) Oral daily  trimethoprim   80 mG/sulfamethoxazole 400 mG 1 Tablet(s) Oral daily  valGANciclovir 450 milliGRAM(s) Oral daily    PRN Inpatient Medications  acetaminophen     Tablet .. 650 milliGRAM(s) Oral every 6 hours PRN        VITALS/PHYSICAL EXAM  --------------------------------------------------------------------------------  T(C): 36.4 (07-16-22 @ 09:03), Max: 37.8 (07-16-22 @ 01:00)  HR: 76 (07-16-22 @ 09:03) (74 - 81)  BP: 145/66 (07-16-22 @ 09:03) (145/66 - 174/73)  RR: 20 (07-16-22 @ 09:03) (18 - 20)  SpO2: 97% (07-16-22 @ 09:03) (96% - 98%)      07-15-22 @ 07:01  -  07-16-22 @ 07:00  --------------------------------------------------------  IN: 900 mL / OUT: 1200 mL / NET: -300 mL      Physical Exam:  Awake and alert  Comfortably resting in bed  Lungs clear   Soft abdomen, RLQ graft non tender  No edema       LABS/STUDIES  --------------------------------------------------------------------------------              8.9    2.67  >-----------<  270      [07-16-22 @ 06:20]              27.9     134  |  100  |  23  ----------------------------<  115      [07-16-22 @ 06:19]  4.0   |  25  |  1.21        Ca     8.4     [07-16-22 @ 06:19]      Mg     1.8     [07-16-22 @ 06:19]      Phos  2.4     [07-16-22 @ 06:19]    TPro  5.8  /  Alb  2.5  /  TBili  0.2  /  DBili  x   /  AST  18  /  ALT  6   /  AlkPhos  134  [07-16-22 @ 06:19]    PT/INR: PT 13.0 , INR 1.12       [07-16-22 @ 06:20]  PTT: 36.9       [07-16-22 @ 06:20]      Creatinine Trend:  SCr 1.21 [07-16 @ 06:19]  SCr 1.18 [07-15 @ 07:42]  SCr 1.08 [07-14 @ 06:32]  SCr 1.10 [07-13 @ 06:26]  SCr 1.16 [07-12 @ 05:53]      CAPILLARY BLOOD GLUCOSE  POCT Blood Glucose.: 134 mg/dL (16 Jul 2022 08:32)  POCT Blood Glucose.: 191 mg/dL (15 Jul 2022 21:35)  POCT Blood Glucose.: 236 mg/dL (15 Jul 2022 17:27)  POCT Blood Glucose.: 222 mg/dL (15 Jul 2022 13:13)      Urinalysis - [07-12-22 @ 23:55]      Color Yellow / Appearance Slightly Turbid / SG 1.020 / pH 6.0      Gluc Trace / Ketone Negative  / Bili Negative / Urobili Negative       Blood Negative / Protein 30 mg/dL / Leuk Est Large / Nitrite Positive      RBC 6 /  / Hyaline 11 / Gran  / Sq Epi  / Non Sq Epi 1 / Bacteria Negative

## 2022-07-16 NOTE — PROGRESS NOTE ADULT - ASSESSMENT
66 yr old Male with PMHx ESRD s/p permacath removal 5/10/22 s/p Rt DDRT 4/21/22,  CVA (2019), Afib on apixaban, seizure activity, CAD s/p stents, CABG (2020), HTN, HLD, DM on insulin, gastric/duodenal ulcer, recent hospitalization 4/28/22 -5/10/22 with weakness/anemia found to have perinephric hematoma requiring evacuation and repair of bleeding arterial anastomosis. Curently being treated for UTI with Levaquin Today after developing multiple sz episodes refractory to 5 mg versed IM (EMS) and 2 mg ativan IV in E.D. concerning for status epilepticus requiring intubation for hypoxic respiratory  failure. MICU Consult was called for hypoxic respiratory failure secondary to status epilepticus.  Nephrology consulted for renal failure.     A/P  DDRT on 04/21/22  Course complicated by DGF, was on HD until 4/29/22. Readmitted in May for anemia in the setting of large perinephric hematoma s/p evacuation and repair of arterial anastomosis on 5/2/22. Intra operative biopsy showed no rejection.  ANA likely sec to  hemodynamic change. Stable and resolving.   stent removal 07/12/22  scr remains stable  monitor BMP, U/O     History of Hyperkalemia  Secondary to Bactrim now resolved off Bactrim.    HTN   suboptimal   -160, Norvasc changed to Nifedipine 7/15  manage by transplant team    monitor BP closely     UTI  Urine culture with ESBL E.coli.  cefepime changed to ertapenem.     Immunosuppression per transplant team     Envarsus discontinued for possible neurotoxicity. Continue     Belatacept, next dose on 7/18      MMF 500mg po BID decreased because of cytopenia and UTI      Prednisone 5mg po daily       For PCP prophylaxis,   Transplant team would like to change atovaquone to bactrim as hyperkalemia has resolved,     CMV prophylaxis.   continue Valcyte 450mg po daily (CMV intermediate risk)

## 2022-07-16 NOTE — PROGRESS NOTE ADULT - SUBJECTIVE AND OBJECTIVE BOX
Transplant Surgery Multidisciplinary Progress Note   --------------------------------------------------------------  R DCD DDRT    4/21/22    Present: Patient seen and examined with multidisciplinary team including Transplant Surgeon: Dr. Mann,  Nephrologist: Kike Montoya MS4, unit RN during am rounds.  Disciplines not in attendance will be notified of the plan.     HPI: 66M with PMH: DM type II, HTN, CAD s/p CABG in 2020, AFib on Eliquis, CVA in 2019 due to Afib, Seizure d/o following CVA, last episode was on 4/8/22, h/o GIB in 2/2022 EGD with duodenal ulcer.  ESRD due to DM was on HD since 2019.     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  Donor was 58 , KDPI 82%, DCD, single vessels and ureter, HLA mismatch 1, 2, 2. No DSA, cPRA 0%. 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 Re-admitted in May for anemia in the setting of large perinephric hematoma s/p evacuation and repair of arterial anastomosis on 5/2/22. Intra operative biopsy showed no rejection, Creatinine ranging ~2mg/dL.    In Rehab: He was recently dx with klebsiella UTI rx with ertapenem - then switched to Levaquin day #6.    He also had parainfluenza pneumonia. Was at rehab progressing well.      Hospital course:  - 6/10: transferred from rehab facility for seizure status epilepticus. Intubated for respiratory protection and transferred to MICU.  EEG showed no seizure activity. Neuro consulted.   - 6/11: Extubated. Found to have R pleural effusion. s/p Thoracentesis 1.3L. Eliquis changed to heparin drip.  Post procedure drop in H&H. 5u PRBC, 2 u FFP.    - 6/11 evening: Transferred to SICU for further care in setting of hypoxia, significant R pleural effusion, hgb 6 and hemodynamic instability  - 6/11 Intubated at 9pm and sedated on Precedex.  CT placed, 600ml blood drained.  1L total overnight, started pressors  - 6/11 Received Protamine for PTT >100 (was on Heparin drip started for AF), 2 u FFP and 5u PRBC total  - 6/13 s/p VATS 2.2L old blood removed; second chest tube placed  - 6/18-19: chest tubes removed  - 6/21: ?PRES on MRI, off Envarsus, switched to Belatacept 6/23  - 6/25: Tx to SICU for refractory seizures  - 7/10: Passed bedside S&S in SICU. Downgraded from SICU to floor.     Interval Events:  - Afebrile, switched to ertapenem for ESBL Klebsiella per ID yesterday (planning for 3 days).  - Remains AOx3, tolerating solids.   - remains with good graft function, UOP 1.2L  Cr 1.2<---1.1  - Phenytoin level today 5.1, reached out to neuro, rec repeating level this afternoon (ordered)     Immunosuppression:   Induction:  Thymoglobulin                                        Maintenance:  - Belatacept: 6/23, 7/4 (initial loading).  Re-loaded 7/8 as there was a gap between doses 2/2 seizures. Next dose 7/18  - Dec MMF to 500 BID (given EBSL Kleb UTI)  - Continue Pred 5  - Ongoing monitoring for signs of rejection.    Potential Discharge date: early next week    Education:  Medications    Plan of care:  See Below      MEDICATIONS  (STANDING):  atorvastatin 40 milliGRAM(s) Oral at bedtime  chlorhexidine 2% Cloths 1 Application(s) Topical <User Schedule>  doxazosin 4 milliGRAM(s) Oral daily  enoxaparin Injectable 60 milliGRAM(s) SubCutaneous every 12 hours  epoetin cortney-epbx (RETACRIT) Injectable 03774 Unit(s) SubCutaneous every 7 days  ertapenem  IVPB      ertapenem  IVPB 1000 milliGRAM(s) IV Intermittent every 24 hours  finasteride 5 milliGRAM(s) Oral daily  hydrALAZINE 100 milliGRAM(s) Oral every 8 hours  insulin glargine Injectable (LANTUS) 4 Unit(s) SubCutaneous at bedtime  insulin lispro (ADMELOG) corrective regimen sliding scale   SubCutaneous three times a day with meals  insulin lispro (ADMELOG) corrective regimen sliding scale   SubCutaneous at bedtime  insulin lispro Injectable (ADMELOG) 1 Unit(s) SubCutaneous three times a day before meals  levothyroxine 50 MICROGram(s) Oral daily  magnesium sulfate  IVPB 1 Gram(s) IV Intermittent once  melatonin 6 milliGRAM(s) Oral at bedtime  mycophenolate mofetil Suspension 500 milliGRAM(s) Oral <User Schedule>  NIFEdipine XL 60 milliGRAM(s) Oral two times a day  nystatin    Suspension 063158 Unit(s) Oral four times a day  pantoprazole   Suspension 40 milliGRAM(s) Oral daily  phenytoin   Capsule 100 milliGRAM(s) Oral <User Schedule>  polyethylene glycol 3350 17 Gram(s) Oral daily  predniSONE  Solution 5 milliGRAM(s) Oral daily  trimethoprim   80 mG/sulfamethoxazole 400 mG 1 Tablet(s) Oral daily  valGANciclovir 450 milliGRAM(s) Oral daily    MEDICATIONS  (PRN):  acetaminophen     Tablet .. 650 milliGRAM(s) Oral every 6 hours PRN Mild Pain (1 - 3)      PAST MEDICAL & SURGICAL HISTORY:  Hypertension      Diabetes      Dyslipidemia      CAD (Coronary Artery Disease)  with Stents in 06/2009 and 6/2019, s/p off-pump C3L on 8/13/20      Hypothyroidism      CVA (cerebral vascular accident)  12/13/19 with residual bilateral weakness      Anemia      PEG (percutaneous endoscopic gastrostomy) status  removed July 2020      Intubation of airway performed without difficulty  Dec 2019  CVA c/b status epilepticus requiring intubation and PEG in 12/2019-      ESRD on dialysis  M/W/F      Seizures  after CVA, last seizure was last week 4/07/22      History of insertion of stent into coronary artery bypass graft  2009 and 2019      S/P CABG x 3  off pump C3L on 8/13/20          Vital Signs Last 24 Hrs  T(C): 36.4 (16 Jul 2022 09:03), Max: 37.8 (16 Jul 2022 01:00)  T(F): 97.6 (16 Jul 2022 09:03), Max: 100 (16 Jul 2022 01:00)  HR: 76 (16 Jul 2022 09:03) (74 - 81)  BP: 145/66 (16 Jul 2022 09:03) (145/66 - 174/73)  BP(mean): --  RR: 20 (16 Jul 2022 09:03) (18 - 20)  SpO2: 97% (16 Jul 2022 09:03) (96% - 98%)    Parameters below as of 16 Jul 2022 09:03  Patient On (Oxygen Delivery Method): room air        I&O's Summary    15 Jul 2022 07:01  -  16 Jul 2022 07:00  --------------------------------------------------------  IN: 900 mL / OUT: 1200 mL / NET: -300 mL                              8.9    2.67  )-----------( 270      ( 16 Jul 2022 06:20 )             27.9     07-16    134<L>  |  100  |  23  ----------------------------<  115<H>  4.0   |  25  |  1.21    Ca    8.4      16 Jul 2022 06:19  Phos  2.4     07-16  Mg     1.8     07-16    TPro  5.8<L>  /  Alb  2.5<L>  /  TBili  0.2  /  DBili  x   /  AST  18  /  ALT  6<L>  /  AlkPhos  134<H>  07-16          Culture - Urine (collected 07-13-22 @ 00:58)  Source: Clean Catch Clean Catch (Midstream)  Final Report (07-15-22 @ 08:40):    50,000 - 99,000 CFU/mL Klebsiella pneumoniae ESBL  Organism: Klebsiella pneumoniae ESBL (07-15-22 @ 08:40)  Organism: Klebsiella pneumoniae ESBL (07-15-22 @ 08:40)          REVIEW OF SYSTEMS  ------------------------------------  Gen: +fatigue, No fevers/chills, +weakness  Skin: No rashes  Head/Eyes/Ears/Mouth: No headache; Normal hearing; Normal vision w/o blurriness; No sinus pain/discomfort, sore throat  Respiratory: No dyspnea, cough, wheezing, hemoptysis  CV: No chest pain, PND, orthopnea  GI: No abdominal pain, diarrhea, constipation, nausea, vomiting, melena, hematochezia  : No increased frequency, dysuria, hematuria, nocturia  MSK: No joint pain/swelling; no back pain; no edema  Neuro: No dizziness/lightheadedness,  seizures, numbness, tingling, +weakness  Heme: No easy bruising or bleeding  Endo: No heat/cold intolerance  Psych: No significant nervousness, anxiety, stress, depression  All other systems were reviewed and are negative, except as noted.          PHYSICAL EXAM:  Constitutional: Well developed / well nourished  Eyes: Anicteric, PERRLA  ENMT: nc/at, +oral thrush  Neck: supple  Respiratory: CTA   Cardiovascular: RRR  Gastrointestinal: Soft, non distended, NT, RLQ incision healed   Genitourinary: voiding spontaneously  Extremities: SCD's in place and working bilaterally, overall edema improving  Vascular: Palpable dp pulses bilaterally  Neurological: AAOx3  Skin: no rashes, ulcerations or lesions  Musculoskeletal: Moving all extremities, global weakness  Psychiatric: calm, follows commands and interacts appropriately     Transplant Surgery Multidisciplinary Progress Note   --------------------------------------------------------------  R DCD DDRT    4/21/22    Present: Patient seen and examined with multidisciplinary team including Transplant Surgeon: Dr. Mann,  Nephrologist: Dr. Nova, [facility] MS4, unit RN during am rounds.  Disciplines not in attendance will be notified of the plan.     HPI: 66M with PMH: DM type II, HTN, CAD s/p CABG in 2020, AFib on Eliquis, CVA in 2019 due to Afib, Seizure d/o following CVA, last episode was on 4/8/22, h/o GIB in 2/2022 EGD with duodenal ulcer.  ESRD due to DM was on HD since 2019.     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  Donor was 58 , KDPI 82%, DCD, single vessels and ureter, HLA mismatch 1, 2, 2. No DSA, cPRA 0%. 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 Re-admitted in May for anemia in the setting of large perinephric hematoma s/p evacuation and repair of arterial anastomosis on 5/2/22. Intra operative biopsy showed no rejection, Creatinine ranging ~2mg/dL.    In Rehab: He was recently dx with klebsiella UTI rx with ertapenem - then switched to Levaquin day #6.    He also had parainfluenza pneumonia. Was at rehab progressing well.      Hospital course:  - 6/10: transferred from rehab facility for seizure status epilepticus. Intubated for respiratory protection and transferred to MICU.  EEG showed no seizure activity. Neuro consulted.   - 6/11: Extubated. Found to have R pleural effusion. s/p Thoracentesis 1.3L. Eliquis changed to heparin drip.  Post procedure drop in H&H. 5u PRBC, 2 u FFP.    - 6/11 evening: Transferred to SICU for further care in setting of hypoxia, significant R pleural effusion, hgb 6 and hemodynamic instability  - 6/11 Intubated at 9pm and sedated on Precedex.  CT placed, 600ml blood drained.  1L total overnight, started pressors  - 6/11 Received Protamine for PTT >100 (was on Heparin drip started for AF), 2 u FFP and 5u PRBC total  - 6/13 s/p VATS 2.2L old blood removed; second chest tube placed  - 6/18-19: chest tubes removed  - 6/21: ?PRES on MRI, off Envarsus, switched to Belatacept 6/23  - 6/25: Tx to SICU for refractory seizures  - 7/10: Passed bedside S&S in SICU. Downgraded from SICU to floor.     Interval Events:  - Afebrile, switched to ertapenem for ESBL Klebsiella per ID yesterday (planning for 3 days).  - Remains AOx3, tolerating solids.   - remains with good graft function, UOP 1.2L  Cr 1.2<---1.1  - Phenytoin level today 5.1, reached out to neuro, rec repeating level this afternoon (ordered)     Immunosuppression:   Induction:  Thymoglobulin                                        Maintenance:  - Belatacept: 6/23, 7/4 (initial loading).  Re-loaded 7/8 as there was a gap between doses 2/2 seizures. Next dose 7/18  - Dec MMF to 500 BID (given EBSL Kleb UTI)  - Continue Pred 5  - Ongoing monitoring for signs of rejection.    Potential Discharge date: early next week    Education:  Medications    Plan of care:  See Below      MEDICATIONS  (STANDING):  atorvastatin 40 milliGRAM(s) Oral at bedtime  chlorhexidine 2% Cloths 1 Application(s) Topical <User Schedule>  doxazosin 4 milliGRAM(s) Oral daily  enoxaparin Injectable 60 milliGRAM(s) SubCutaneous every 12 hours  epoetin cortney-epbx (RETACRIT) Injectable 43756 Unit(s) SubCutaneous every 7 days  ertapenem  IVPB      ertapenem  IVPB 1000 milliGRAM(s) IV Intermittent every 24 hours  finasteride 5 milliGRAM(s) Oral daily  hydrALAZINE 100 milliGRAM(s) Oral every 8 hours  insulin glargine Injectable (LANTUS) 4 Unit(s) SubCutaneous at bedtime  insulin lispro (ADMELOG) corrective regimen sliding scale   SubCutaneous three times a day with meals  insulin lispro (ADMELOG) corrective regimen sliding scale   SubCutaneous at bedtime  insulin lispro Injectable (ADMELOG) 1 Unit(s) SubCutaneous three times a day before meals  levothyroxine 50 MICROGram(s) Oral daily  magnesium sulfate  IVPB 1 Gram(s) IV Intermittent once  melatonin 6 milliGRAM(s) Oral at bedtime  mycophenolate mofetil Suspension 500 milliGRAM(s) Oral <User Schedule>  NIFEdipine XL 60 milliGRAM(s) Oral two times a day  nystatin    Suspension 170770 Unit(s) Oral four times a day  pantoprazole   Suspension 40 milliGRAM(s) Oral daily  phenytoin   Capsule 100 milliGRAM(s) Oral <User Schedule>  polyethylene glycol 3350 17 Gram(s) Oral daily  predniSONE  Solution 5 milliGRAM(s) Oral daily  trimethoprim   80 mG/sulfamethoxazole 400 mG 1 Tablet(s) Oral daily  valGANciclovir 450 milliGRAM(s) Oral daily    MEDICATIONS  (PRN):  acetaminophen     Tablet .. 650 milliGRAM(s) Oral every 6 hours PRN Mild Pain (1 - 3)      PAST MEDICAL & SURGICAL HISTORY:  Hypertension      Diabetes      Dyslipidemia      CAD (Coronary Artery Disease)  with Stents in 06/2009 and 6/2019, s/p off-pump C3L on 8/13/20      Hypothyroidism      CVA (cerebral vascular accident)  12/13/19 with residual bilateral weakness      Anemia      PEG (percutaneous endoscopic gastrostomy) status  removed July 2020      Intubation of airway performed without difficulty  Dec 2019  CVA c/b status epilepticus requiring intubation and PEG in 12/2019-      ESRD on dialysis  M/W/F      Seizures  after CVA, last seizure was last week 4/07/22      History of insertion of stent into coronary artery bypass graft  2009 and 2019      S/P CABG x 3  off pump C3L on 8/13/20          Vital Signs Last 24 Hrs  T(C): 36.4 (16 Jul 2022 09:03), Max: 37.8 (16 Jul 2022 01:00)  T(F): 97.6 (16 Jul 2022 09:03), Max: 100 (16 Jul 2022 01:00)  HR: 76 (16 Jul 2022 09:03) (74 - 81)  BP: 145/66 (16 Jul 2022 09:03) (145/66 - 174/73)  BP(mean): --  RR: 20 (16 Jul 2022 09:03) (18 - 20)  SpO2: 97% (16 Jul 2022 09:03) (96% - 98%)    Parameters below as of 16 Jul 2022 09:03  Patient On (Oxygen Delivery Method): room air        I&O's Summary    15 Jul 2022 07:01  -  16 Jul 2022 07:00  --------------------------------------------------------  IN: 900 mL / OUT: 1200 mL / NET: -300 mL                              8.9    2.67  )-----------( 270      ( 16 Jul 2022 06:20 )             27.9     07-16    134<L>  |  100  |  23  ----------------------------<  115<H>  4.0   |  25  |  1.21    Ca    8.4      16 Jul 2022 06:19  Phos  2.4     07-16  Mg     1.8     07-16    TPro  5.8<L>  /  Alb  2.5<L>  /  TBili  0.2  /  DBili  x   /  AST  18  /  ALT  6<L>  /  AlkPhos  134<H>  07-16          Culture - Urine (collected 07-13-22 @ 00:58)  Source: Clean Catch Clean Catch (Midstream)  Final Report (07-15-22 @ 08:40):    50,000 - 99,000 CFU/mL Klebsiella pneumoniae ESBL  Organism: Klebsiella pneumoniae ESBL (07-15-22 @ 08:40)  Organism: Klebsiella pneumoniae ESBL (07-15-22 @ 08:40)          REVIEW OF SYSTEMS  ------------------------------------  Gen: +fatigue, No fevers/chills, +weakness  Skin: No rashes  Head/Eyes/Ears/Mouth: No headache; Normal hearing; Normal vision w/o blurriness; No sinus pain/discomfort, sore throat  Respiratory: No dyspnea, cough, wheezing, hemoptysis  CV: No chest pain, PND, orthopnea  GI: No abdominal pain, diarrhea, constipation, nausea, vomiting, melena, hematochezia  : No increased frequency, dysuria, hematuria, nocturia  MSK: No joint pain/swelling; no back pain; no edema  Neuro: No dizziness/lightheadedness,  seizures, numbness, tingling, +weakness  Heme: No easy bruising or bleeding  Endo: No heat/cold intolerance  Psych: No significant nervousness, anxiety, stress, depression  All other systems were reviewed and are negative, except as noted.          PHYSICAL EXAM:  Constitutional: Well developed / well nourished  Eyes: Anicteric, PERRLA  ENMT: nc/at, +oral thrush  Neck: supple  Respiratory: CTA   Cardiovascular: RRR  Gastrointestinal: Soft, non distended, NT, RLQ incision healed   Genitourinary: voiding spontaneously  Extremities: SCD's in place and working bilaterally, overall edema improving  Vascular: Palpable dp pulses bilaterally  Neurological: AAOx3  Skin: no rashes, ulcerations or lesions  Musculoskeletal: Moving all extremities, global weakness  Psychiatric: calm, follows commands and interacts appropriately

## 2022-07-16 NOTE — PROGRESS NOTE ADULT - ASSESSMENT
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   PMH: DM type II, HTN, CAD s/p CABG in 2020, Afib on Eliquis, CVA in 2019 due to Afib, Seizure d/o following CVA, h/o GI bleed in 2/2022 EGD with duodenal ulcer.  Donor was 57 , KDPI 82%, DCD, single vessels and ureter, HLA mismatch 1, 2, 2. No DSA, cPRA 0%. CMV +/+    Hospitalized May 2022 for anemia in the setting of large perinephric hematoma s/p evacuation and repair of arterial anastomosis on 5/2/22. Intra operative biopsy showed no rejection.  H/o seizure d/o with last seizure episode prior to transplant was on 4/8/22. He is readmitted for status epilepticus in the setting of sub therapeutic valproic acid levels. Course complicated by respiratory failure, hemothorax and hemorrhagic shock following right thoracentesis done on 6/10 for effusion. S/p PRBC x 5 units and vasopressors, chest tube, followed by VATS.    Envarsus discontinued and started on belatacept due to concern for neurotoxicity.   Shagufta function is stable, mental status is slowly improving. He is on fosphenytoin with good control of seizures.       1. S/p DCD DDRT on 4/21/22  - creatinine stable at 1.2mg/dL. Electrolytes and volume status fair. Transplant ureteric stent removed 7/12.     2. Immunosuppression - Envarsus discontinued for possible neurotoxicity. Continue Belatacept, next dose on 7/18      Reduce Cellcept to 500mg po bid for cytopenia and UTI      Prednisone 5mg po daily      Infection prophylaxis - for PCP prophylaxis, change atovaquone to bactrim as hyperkalemia has resolved, continue Valcyte 450mg po daily (CMV intermediate risk)    3. Seizure d/o -admitted with status epilepticus . Now controlled on fosphenytoin.      4. HTN:  BP improving on current regimen Hydralazine 100mg q8, Nifedipine 60mg po bid, Doxazosin 4mg.     5. BPH with urine retention - continue Proscar and doxazosin    6. Cytopenia -  Continue epo 10,000 weekly for anemia, reduce MMF to 500mg po bid.     7. Atrial fibrillation - was on Eliquis, now on therapeutic Lovenox due to drug interaction with fosphenytoin. Family comfortable with continuing with Lovenox for now rather than coumadin.     8. ESBL Klebsiella UTI - Continue ertapenem x 3 days per transplant ID    D/c planning to sub acute rehab.

## 2022-07-16 NOTE — PROGRESS NOTE ADULT - SUBJECTIVE AND OBJECTIVE BOX
[facility]   MD NINA MASON MD KRISTINE SOLTANPOUR, DO ANGELA WONG, PA    TEL:  OFFICE: [phone]  From 5pm-7am Answering Service [phone]    -- RENAL FOLLOW UP NOTE ---Date of Service 07-16-22 @ 17:12    Patient is a 66y old  Male who presents with a chief complaint of Status Epilepticus (16 Jul 2022 10:13)      Patient seen and examined at bedside. No chest pain/sob per son. Resting.     VITALS:  T(F): 98.6 (07-16-22 @ 17:00), Max: 100 (07-16-22 @ 01:00)  HR: 78 (07-16-22 @ 17:00)  BP: 165/71 (07-16-22 @ 17:00)  RR: 20 (07-16-22 @ 17:00)  SpO2: 100% (07-16-22 @ 17:00)  Wt(kg): --    07-15 @ 07:01  -  07-16 @ 07:00  --------------------------------------------------------  IN: 900 mL / OUT: 1200 mL / NET: -300 mL    07-16 @ 07:01  -  07-16 @ 17:12  --------------------------------------------------------  IN: 530 mL / OUT: 400 mL / NET: 130 mL          PHYSICAL EXAM:  General: NAD  Neck: No JVD  Respiratory: CTAB, no wheezes, rales or rhonchi  Cardiovascular: S1, S2, RRR  Gastrointestinal: BS+, soft, NT/ND  Extremities: No peripheral edema    Hospital Medications:   MEDICATIONS  (STANDING):  atorvastatin 40 milliGRAM(s) Oral at bedtime  chlorhexidine 2% Cloths 1 Application(s) Topical <User Schedule>  doxazosin 4 milliGRAM(s) Oral daily  enoxaparin Injectable 60 milliGRAM(s) SubCutaneous every 12 hours  epoetin cortney-epbx (RETACRIT) Injectable 71016 Unit(s) SubCutaneous every 7 days  ertapenem  IVPB      ertapenem  IVPB 1000 milliGRAM(s) IV Intermittent every 24 hours  finasteride 5 milliGRAM(s) Oral daily  fosphenytoin IVPB 100 milliGRAM(s) PE IV Intermittent once  hydrALAZINE 100 milliGRAM(s) Oral every 8 hours  insulin glargine Injectable (LANTUS) 4 Unit(s) SubCutaneous at bedtime  insulin lispro (ADMELOG) corrective regimen sliding scale   SubCutaneous three times a day with meals  insulin lispro (ADMELOG) corrective regimen sliding scale   SubCutaneous at bedtime  insulin lispro Injectable (ADMELOG) 1 Unit(s) SubCutaneous three times a day before meals  levothyroxine 50 MICROGram(s) Oral daily  melatonin 6 milliGRAM(s) Oral at bedtime  mycophenolate mofetil Suspension 500 milliGRAM(s) Oral <User Schedule>  NIFEdipine XL 60 milliGRAM(s) Oral two times a day  nystatin    Suspension 178302 Unit(s) Oral four times a day  pantoprazole    Tablet 40 milliGRAM(s) Oral daily  phenytoin   Capsule 60 milliGRAM(s) Oral <User Schedule>  polyethylene glycol 3350 17 Gram(s) Oral daily  predniSONE  Solution 5 milliGRAM(s) Oral daily  trimethoprim   80 mG/sulfamethoxazole 400 mG 1 Tablet(s) Oral daily  valGANciclovir 450 milliGRAM(s) Oral daily      LABS:  07-16    134<L>  |  100  |  23  ----------------------------<  115<H>  4.0   |  25  |  1.21    Ca    8.4      16 Jul 2022 06:19  Phos  2.4     07-16  Mg     1.8     07-16    TPro  5.8<L>  /  Alb  2.5<L>  /  TBili  0.2  /  DBili      /  AST  18  /  ALT  6<L>  /  AlkPhos  134<H>  07-16    Creatinine Trend: 1.21 <--, 1.18 <--, 1.08 <--, 1.10 <--, 1.16 <--, 1.15 <--, 1.16 <--, 1.17 <--, 1.25 <--    Albumin, Serum: 2.5 g/dL (07-16 @ 06:19)  Phosphorus Level, Serum: 2.4 mg/dL (07-16 @ 06:19)                              8.9    2.67  )-----------( 270      ( 16 Jul 2022 06:20 )             27.9     Urine Studies:  Urinalysis - [07-12-22 @ 23:55]      Color Yellow / Appearance Slightly Turbid / SG 1.020 / pH 6.0      Gluc Trace / Ketone Negative  / Bili Negative / Urobili Negative       Blood Negative / Protein 30 mg/dL / Leuk Est Large / Nitrite Positive      RBC 6 /  / Hyaline 11 / Gran  / Sq Epi  / Non Sq Epi 1 / Bacteria Negative      Iron 17, TIBC 171, %sat 10      [05-02-22 @ 13:03]  Ferritin 1505      [05-02-22 @ 13:05]  PTH -- (Ca 9.2)      [02-05-22 @ 10:13]   294  HbA1c 6.0      [02-21-20 @ 08:53]  TSH 4.69      [07-06-22 @ 18:36]      Syphilis Screen (Treponema Pallidum Ab) Negative      [06-27-22 @ 02:00]    RADIOLOGY & ADDITIONAL STUDIES:

## 2022-07-16 NOTE — CHART NOTE - NSCHARTNOTEFT_GEN_A_CORE
Called for Phenytoin level     Recommendatons  1. Give Fosphenyptoin 100mg IV x1   2. Followed by increasing regiment to Phenytoin 100mg PO QAM, 100mg PO at noon and 50mg QHS   3. Repeat level in the AM   4. Will continue to follow       Case was discussed with Dr. Maria.

## 2022-07-16 NOTE — PROGRESS NOTE ADULT - ASSESSMENT
66M with h/o DM type II, HTN, CAD s/p CABG in 2020, Afib on Eliquis, CVA in 2019 due to Afib, Seizure d/o (last episode was on 4/8/22), h/o GI bleed in 2/2022 EGD with duodenal ulcer and ESRD on HD s/p R DDRT from DCD donor on 4/21/22 complicated by DGF requiring HD until 4/29/22 now with good graft function who presented with status epilepticus in setting of UTI/antibiotics and sub-therapeutic valproic acid level     Seizure Disorder:  - Repeat MRI head 6/27 with less concerns for PRES, likely ischemic changes  - Remains seizure free  - Antiepileptic regimen per Neurology, currently on Phenytoin 100BID; level today 5.1, discussed w/ neuro; plan to send repeat level this afternoon.   - Keep Mag > 2    R DCD DDRT (ureter stented) 4/21/22:  - Excellent graft function  - Klebsiella UTI (post stent removal): will start Ertapenem x 3D per Transplant ID.  Cleared by Neuro/Pharmacy  - S/P removal of ureteral stent on 7/12    - Strict I/Os, Continue Doxazosin/Proscar. 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 Vocal cords good. Reflux precautions (no spicy food and caffeine once has diet). Voice rest.  - OOB with RN/PT    Immunosuppression:   - Belatacept: 6/23, 7/4 (initial loading).  Re-loaded 7/8 as there was a gap between doses 2/2 seizures. next dose 7/18  - Decrease Cellcept to 500 BID given ESBL Kleb UTI.   - continue Pred 5  - PPx: Valcyte/PPI/nystatin. DC atovaquone and start bactrim since is no longer hyperkalemic.     DM  - on Lantus/Lispro, Endocrine following     AFib:  - continue Retacrit weekly  - Eliquis with ~40% less efficacy while on fosphenytoin.  On Lovenox 60BID. Plan to continue switch to Coumadin once discharged home from rehab. Plan discussed with neurology and family  - rate controlled    R IJ DVT  - On Lovenox 60BID    HTN:  - switch from Norvasc-->Nifedipine./Hydralazine/Cardura    DISPO  -OK next week  -to f/u with Dr. Andre Patterson, Epilepsy team upon d/c

## 2022-07-17 NOTE — PROGRESS NOTE ADULT - NS ATTEND AMEND GEN_ALL_CORE FT
s/p DDRT with complicated post-op course, continues to improve slowly  passed modified swallow test, now tolerating regular PO  Immunosuppression - transitioned to Belatacept. Cont prednisone 5mg daily, cellcept 1gm bid  Prophylaxis with mepron, valcyte, nystatin  Cont abx as per ID for UTI, ESBL EColi currently on ertapenem for another 2 days  HTN controlled with nifedipine, hydralazine, cardura  PT/OT  plan for discharge to subacute rehab when completes course of abx  seizure prophylaxis as per neurology

## 2022-07-17 NOTE — PROGRESS NOTE ADULT - SUBJECTIVE AND OBJECTIVE BOX
Transplant Surgery Multidisciplinary Progress Note   --------------------------------------------------------------  R DCD DDRT    4/21/22    Present: Patient seen and examined with multidisciplinary team including Transplant Surgeon: Dr. Mann,  Nephrologist: Dr. Nova, [facility] unit RN during am rounds.  Disciplines not in attendance will be notified of the plan.     HPI: 66M with PMH: DM type II, HTN, CAD s/p CABG in 2020, AFib on Eliquis, CVA in 2019 due to Afib, Seizure d/o following CVA, last episode was on 4/8/22, h/o GIB in 2/2022 EGD with duodenal ulcer.  ESRD due to DM was on HD since 2019.     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  Donor was 58 , KDPI 82%, DCD, single vessels and ureter, HLA mismatch 1, 2, 2. No DSA, cPRA 0%. 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 Re-admitted in May for anemia in the setting of large perinephric hematoma s/p evacuation and repair of arterial anastomosis on 5/2/22. Intra operative biopsy showed no rejection, Creatinine ranging ~2mg/dL.    In Rehab: He was recently dx with klebsiella UTI rx with ertapenem - then switched to Levaquin day #6.    He also had parainfluenza pneumonia. Was at rehab progressing well.      Hospital course:  - 6/10: transferred from rehab facility for seizure status epilepticus. Intubated for respiratory protection and transferred to MICU.  EEG showed no seizure activity. Neuro consulted.   - 6/11: Extubated. Found to have R pleural effusion. s/p Thoracentesis 1.3L. Eliquis changed to heparin drip.  Post procedure drop in H&H. 5u PRBC, 2 u FFP.    - 6/11 evening: Transferred to SICU for further care in setting of hypoxia, significant R pleural effusion, hgb 6 and hemodynamic instability  - 6/11 Intubated at 9pm and sedated on Precedex.  CT placed, 600ml blood drained.  1L total overnight, started pressors  - 6/11 Received Protamine for PTT >100 (was on Heparin drip started for AF), 2 u FFP and 5u PRBC total  - 6/13 s/p VATS 2.2L old blood removed; second chest tube placed  - 6/18-19: chest tubes removed  - 6/21: ?PRES on MRI, off Envarsus, switched to Belatacept 6/23  - 6/25: Tx to SICU for refractory seizures  - 7/10: Passed bedside S&S in SICU. Downgraded from SICU to floor.     Interval Events:  - On ertapenem for ESBL Klebsiella per ID  (planning for 3 days).  - Remains AOx3, tolerating solids.   - remains with good graft function, UOP 1.2L  Cr 1.26<---1.2<---1.1  - Phenytoin level yesterday 5.1, repeat 4.8. Discussed w/ neuro and gave loading dose of Fosphenytoin 100 IV yesterday and increased PO phenytoin to 100/100/60 from 100 BID. Phenytoin level this AM is 4.2, neuro made aware, awaiting recs.     Immunosuppression:   Induction:  Thymoglobulin                                        Maintenance:  - Belatacept: 6/23, 7/4 (initial loading).  Re-loaded 7/8 as there was a gap between doses 2/2 seizures. Next dose 7/18 (tomorrow)  - MMF to 500 BID (1/2 dose given EBSL Kleb UTI)  - Continue Pred 5  - Ongoing monitoring for signs of rejection.    Potential Discharge date: early this week    Education:  Medications    Plan of care:  See Below      MEDICATIONS  (STANDING):  atorvastatin 40 milliGRAM(s) Oral at bedtime  chlorhexidine 2% Cloths 1 Application(s) Topical <User Schedule>  doxazosin 4 milliGRAM(s) Oral <User Schedule>  enoxaparin Injectable 60 milliGRAM(s) SubCutaneous every 12 hours  epoetin cortney-epbx (RETACRIT) Injectable 39561 Unit(s) SubCutaneous every 7 days  ertapenem  IVPB      ertapenem  IVPB 1000 milliGRAM(s) IV Intermittent every 24 hours  finasteride 5 milliGRAM(s) Oral daily  hydrALAZINE 100 milliGRAM(s) Oral every 8 hours  insulin glargine Injectable (LANTUS) 4 Unit(s) SubCutaneous at bedtime  insulin lispro (ADMELOG) corrective regimen sliding scale   SubCutaneous three times a day with meals  insulin lispro (ADMELOG) corrective regimen sliding scale   SubCutaneous at bedtime  insulin lispro Injectable (ADMELOG) 1 Unit(s) SubCutaneous three times a day before meals  levothyroxine 50 MICROGram(s) Oral daily  melatonin 6 milliGRAM(s) Oral at bedtime  mycophenolate mofetil Suspension 500 milliGRAM(s) Oral <User Schedule>  NIFEdipine XL 60 milliGRAM(s) Oral two times a day  nystatin    Suspension 994196 Unit(s) Oral four times a day  pantoprazole    Tablet 40 milliGRAM(s) Oral daily  phenytoin   Capsule 100 milliGRAM(s) Oral <User Schedule>  phenytoin   Capsule 60 milliGRAM(s) Oral <User Schedule>  polyethylene glycol 3350 17 Gram(s) Oral daily  predniSONE  Solution 5 milliGRAM(s) Oral daily  sodium chloride 0.9% Bolus 500 milliLiter(s) IV Bolus once  sodium phosphate IVPB 15 milliMole(s) IV Intermittent once  trimethoprim   80 mG/sulfamethoxazole 400 mG 1 Tablet(s) Oral daily  valGANciclovir 450 milliGRAM(s) Oral daily    MEDICATIONS  (PRN):  acetaminophen     Tablet .. 650 milliGRAM(s) Oral every 6 hours PRN Mild Pain (1 - 3)      PAST MEDICAL & SURGICAL HISTORY:  Hypertension      Diabetes      Dyslipidemia      CAD (Coronary Artery Disease)  with Stents in 06/2009 and 6/2019, s/p off-pump C3L on 8/13/20      Hypothyroidism      CVA (cerebral vascular accident)  12/13/19 with residual bilateral weakness      Anemia      PEG (percutaneous endoscopic gastrostomy) status  removed July 2020      Intubation of airway performed without difficulty  Dec 2019  CVA c/b status epilepticus requiring intubation and PEG in 12/2019-      ESRD on dialysis  M/W/F      Seizures  after CVA, last seizure was last week 4/07/22      History of insertion of stent into coronary artery bypass graft  2009 and 2019      S/P CABG x 3  off pump C3L on 8/13/20          Vital Signs Last 24 Hrs  T(C): 37.1 (17 Jul 2022 08:43), Max: 37.1 (17 Jul 2022 08:43)  T(F): 98.7 (17 Jul 2022 08:43), Max: 98.7 (17 Jul 2022 08:43)  HR: 74 (17 Jul 2022 08:43) (73 - 78)  BP: 157/66 (17 Jul 2022 08:43) (141/65 - 170/74)  BP(mean): --  RR: 20 (17 Jul 2022 08:43) (20 - 20)  SpO2: 95% (17 Jul 2022 08:43) (95% - 100%)    Parameters below as of 17 Jul 2022 08:43  Patient On (Oxygen Delivery Method): room air        I&O's Summary    16 Jul 2022 07:01  -  17 Jul 2022 07:00  --------------------------------------------------------  IN: 1110 mL / OUT: 900 mL / NET: 210 mL                              8.7    2.75  )-----------( 255      ( 17 Jul 2022 06:25 )             27.2     07-17    131<L>  |  99  |  26<H>  ----------------------------<  148<H>  4.6   |  23  |  1.26    Ca    8.1<L>      17 Jul 2022 06:25  Phos  2.3     07-17  Mg     1.8     07-17    TPro  5.6<L>  /  Alb  2.1<L>  /  TBili  0.2  /  DBili  x   /  AST  17  /  ALT  7<L>  /  AlkPhos  130<H>  07-17          Culture - Blood (collected 07-15-22 @ 08:36)  Source: .Blood Blood  Preliminary Report (07-16-22 @ 12:01):    No growth to date.    Culture - Blood (collected 07-15-22 @ 07:15)  Source: .Blood Blood  Preliminary Report (07-16-22 @ 18:01):    No growth to date.    Culture - Urine (collected 07-13-22 @ 00:58)  Source: Clean Catch Clean Catch (Midstream)  Final Report (07-15-22 @ 08:40):    50,000 - 99,000 CFU/mL Klebsiella pneumoniae ESBL  Organism: Klebsiella pneumoniae ESBL (07-15-22 @ 08:40)  Organism: Klebsiella pneumoniae ESBL (07-15-22 @ 08:40)      REVIEW OF SYSTEMS  ------------------------------------  Gen: +fatigue, No fevers/chills, +weakness  Skin: No rashes  Head/Eyes/Ears/Mouth: No headache; Normal hearing; Normal vision w/o blurriness; No sinus pain/discomfort, sore throat  Respiratory: No dyspnea, cough, wheezing, hemoptysis  CV: No chest pain, PND, orthopnea  GI: No abdominal pain, diarrhea, constipation, nausea, vomiting, melena, hematochezia  : No increased frequency, dysuria, hematuria, nocturia  MSK: No joint pain/swelling; no back pain; no edema  Neuro: No dizziness/lightheadedness,  seizures, numbness, tingling, +weakness  Heme: No easy bruising or bleeding  Endo: No heat/cold intolerance  Psych: No significant nervousness, anxiety, stress, depression  All other systems were reviewed and are negative, except as noted.          PHYSICAL EXAM:  Constitutional: Well developed / well nourished  Eyes: Anicteric, PERRLA  ENMT: nc/at, +oral thrush  Neck: supple  Respiratory: CTA   Cardiovascular: RRR  Gastrointestinal: Soft, non distended, NT, RLQ incision healed   Genitourinary: voiding spontaneously  Extremities: SCD's in place and working bilaterally, overall edema improving  Vascular: Palpable dp pulses bilaterally  Neurological: AAOx3  Skin: no rashes, ulcerations or lesions  Musculoskeletal: Moving all extremities, global weakness  Psychiatric: calm, follows commands and interacts appropriately

## 2022-07-17 NOTE — PROGRESS NOTE ADULT - ASSESSMENT
66 yr old M with Type 2 DM> unknown control due to skewed A1C 6.6% secondary to chronic anemia and s/p blood tx. On basal/bolus insulin therapy PTA. DM c/b ESRD s/p DDRT on 3/21, CVA, CAD s/p CABG, Afib on apixaban, seizure activity, HTN, HLD, h/o GI bleed in 2/2022 EGD with duodenal ulcer, discharged to [facility] rehab center after prior hospitalization, now re-admitted from [facility] for seizures c/f status epilepticus in setting of UTI. endocrine consulted for steroid induced hyperglycemia, now on home dose prednisone 5mg. Prolonged hospital course w/ ICU stay, previously intubated/extubated, previous seizures. S/p ureteral stent removal 7/12/22.   pt is now off tube feeds and tolerating PO diet.

## 2022-07-17 NOTE — PROGRESS NOTE ADULT - ASSESSMENT
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   PMH: DM type II, HTN, CAD s/p CABG in 2020, Afib on Eliquis, CVA in 2019 due to Afib, Seizure d/o following CVA, h/o GI bleed in 2/2022 EGD with duodenal ulcer.  Donor was 57 , KDPI 82%, DCD, single vessels and ureter, HLA mismatch 1, 2, 2. No DSA, cPRA 0%. CMV +/+    Hospitalized May 2022 for anemia in the setting of large perinephric hematoma s/p evacuation and repair of arterial anastomosis on 5/2/22. Intra operative biopsy showed no rejection.  H/o seizure d/o with last seizure episode prior to transplant was on 4/8/22. He is readmitted for status epilepticus in the setting of sub therapeutic valproic acid levels. Course complicated by respiratory failure, hemothorax and hemorrhagic shock following right thoracentesis done on 6/10 for effusion. S/p PRBC x 5 units and vasopressors, chest tube, followed by VATS.    Envarsus discontinued and started on belatacept due to concern for neurotoxicity.   Shagufta function is stable, mental status is slowly improving. He is on fosphenytoin with good control of seizures.       1. S/p DCD DDRT on 4/21/22  - creatinine stable at 1.2mg/dL. Electrolytes and volume status fair. Transplant ureteric stent removed 7/12.     2. Immunosuppression - Envarsus discontinued for possible neurotoxicity. Continue Belatacept, next dose on 7/18      Continue Cellcept to 500mg po bid - dose reduced on 7/16 for cytopenia and UTI      Continue Prednisone 5mg po daily      Infection prophylaxis - continue bactrim ss daily , continue Valcyte 450mg daily (CMV intermediate risk)     3. Seizure d/o -admitted with status epilepticus . Now controlled on fosphenytoin. Drug level low. Neurology follow up.      4. HTN:  BP sub optimal on current regimen Hydralazine 100mg q8, Nifedipine 60mg po bid, Doxazosin 4mg.        Continue Nifedipine and Hydralazine at current doses, increase doxazosin to 4mg po bid      5. BPH with urine retention - continue Proscar and doxazosin    6. Cytopenia -  Continue epo 10,000 weekly for anemia, continue MMF to 500mg po bid.     7. Atrial fibrillation - was on Eliquis, now on therapeutic Lovenox due to drug interaction with fosphenytoin. Family comfortable with continuing with Lovenox for now rather than coumadin.     8. ESBL Klebsiella UTI - Continue ertapenem x 3 days total per transplant ID    D/c planning to sub acute rehab.

## 2022-07-17 NOTE — PROGRESS NOTE ADULT - PROBLEM SELECTOR PLAN 2
BP goal in DM   being maganed by primary team   currently not on any BP agents  outpt urine mc.cr ratio to be done

## 2022-07-17 NOTE — PROGRESS NOTE ADULT - PROBLEM SELECTOR PLAN 3
-Currently on levothyroxine Injectable 40 MICROGram(s) IV Push at bedtime  -now that the pt is taking PO would would reccomend to restart LT4 50 mcg daily on an empty stomach at least 1 hour apart from food or PO meds  - monitor TFTs outpatient in 4 weeks after dc with Dr. Lincoln      Can be reached via TEAMS/pager: [phone]   office:  [phone] (M-F 9a-5pm)               [phone] (nights/weekends)   [email or web address] password [email or web address]

## 2022-07-17 NOTE — PROGRESS NOTE ADULT - SUBJECTIVE AND OBJECTIVE BOX
[facility] [place] [facility]   MD NINA MASON MD KRISTINE SOLTANPOUR, WALDO PALMA    TEL:  OFFICE: [phone]  From 5pm-7am Answering Service [phone]    -- RENAL FOLLOW UP NOTE ---Date of Service 07-17-22 @ 17:12    Patient is a 66y old  Male who presents with a chief complaint of Status Epilepticus (16 Jul 2022 10:13)      Patient seen and examined at bedside. No chest pain/sob per son.    Vital Signs  T(C): 36.7 (17 Jul 2022 17:00), Max: 37.1 (17 Jul 2022 08:43)  T(F): 98.1 (17 Jul 2022 17:00), Max: 98.7 (17 Jul 2022 08:43)  HR: 73 (17 Jul 2022 17:00) (70 - 76)  BP: 154/77 (17 Jul 2022 17:00) (141/65 - 170/74)  BP(mean): --  ABP: --  ABP(mean): --  RR: 18 (17 Jul 2022 17:00) (18 - 20)  SpO2: 100% (17 Jul 2022 17:00) (95% - 100%)    O2 Parameters below as of 17 Jul 2022 17:00  Patient On (Oxygen Delivery Method): room air          07-15 @ 07:01  -  07-16 @ 07:00  --------------------------------------------------------  IN: 900 mL / OUT: 1200 mL / NET: -300 mL    07-16 @ 07:01  -  07-16 @ 17:12  --------------------------------------------------------  IN: 530 mL / OUT: 400 mL / NET: 130 mL          PHYSICAL EXAM:  General: NAD  Neck: No JVD  Respiratory: CTAB, no wheezes, rales or rhonchi  Cardiovascular: S1, S2, RRR  Gastrointestinal: BS+, soft, NT/ND  Extremities: No peripheral edema    MEDICATIONS  (STANDING):  atorvastatin 40 milliGRAM(s) Oral at bedtime  chlorhexidine 2% Cloths 1 Application(s) Topical <User Schedule>  doxazosin 4 milliGRAM(s) Oral <User Schedule>  enoxaparin Injectable 60 milliGRAM(s) SubCutaneous every 12 hours  epoetin cortney-epbx (RETACRIT) Injectable 48633 Unit(s) SubCutaneous every 7 days  finasteride 5 milliGRAM(s) Oral daily  hydrALAZINE 100 milliGRAM(s) Oral every 8 hours  insulin glargine Injectable (LANTUS) 4 Unit(s) SubCutaneous at bedtime  insulin lispro (ADMELOG) corrective regimen sliding scale   SubCutaneous three times a day with meals  insulin lispro (ADMELOG) corrective regimen sliding scale   SubCutaneous at bedtime  insulin lispro Injectable (ADMELOG) 3 Unit(s) SubCutaneous three times a day before meals  levothyroxine 50 MICROGram(s) Oral daily  melatonin 6 milliGRAM(s) Oral at bedtime  mycophenolate mofetil Suspension 500 milliGRAM(s) Oral <User Schedule>  NIFEdipine XL 60 milliGRAM(s) Oral two times a day  nystatin    Suspension 066679 Unit(s) Oral four times a day  pantoprazole    Tablet 40 milliGRAM(s) Oral daily  phenytoin   Capsule 100 milliGRAM(s) Oral <User Schedule>  phenytoin   Capsule 60 milliGRAM(s) Oral <User Schedule>  polyethylene glycol 3350 17 Gram(s) Oral daily  predniSONE  Solution 5 milliGRAM(s) Oral daily  trimethoprim   80 mG/sulfamethoxazole 400 mG 1 Tablet(s) Oral daily  valGANciclovir 450 milliGRAM(s) Oral daily    MEDICATIONS  (PRN):  acetaminophen     Tablet .. 650 milliGRAM(s) Oral every 6 hours PRN Mild Pain (1 - 3)      LABS:    07-17    131<L>  |  99  |  26<H>  ----------------------------<  148<H>  4.6   |  23  |  1.26    Ca    8.1<L>      17 Jul 2022 06:25  Phos  2.3     07-17  Mg     1.8     07-17    TPro  5.6<L>  /  Alb  2.1<L>  /  TBili  0.2  /  DBili  x   /  AST  17  /  ALT  7<L>  /  AlkPhos  130<H>  07-17 07-16    134<L>  |  100  |  23  ----------------------------<  115<H>  4.0   |  25  |  1.21    Ca    8.4      16 Jul 2022 06:19  Phos  2.4     07-16  Mg     1.8     07-16    TPro  5.8<L>  /  Alb  2.5<L>  /  TBili  0.2  /  DBili      /  AST  18  /  ALT  6<L>  /  AlkPhos  134<H>  07-16    Creatinine Trend: 1.21<--1.21 <--, 1.18 <--, 1.08 <--, 1.10 <--, 1.16 <--, 1.15 <--, 1.16 <--, 1.17 <--, 1.25 <--    Albumin, Serum: 2.5 g/dL (07-16 @ 06:19)  Phosphorus Level, Serum: 2.4 mg/dL (07-16 @ 06:19)                              8.9    2.67  )-----------( 270      ( 16 Jul 2022 06:20 )             27.9     Urine Studies:  Urinalysis - [07-12-22 @ 23:55]      Color Yellow / Appearance Slightly Turbid / SG 1.020 / pH 6.0      Gluc Trace / Ketone Negative  / Bili Negative / Urobili Negative       Blood Negative / Protein 30 mg/dL / Leuk Est Large / Nitrite Positive      RBC 6 /  / Hyaline 11 / Gran  / Sq Epi  / Non Sq Epi 1 / Bacteria Negative      Iron 17, TIBC 171, %sat 10      [05-02-22 @ 13:03]  Ferritin 1505      [05-02-22 @ 13:05]  PTH -- (Ca 9.2)      [02-05-22 @ 10:13]   294  HbA1c 6.0      [02-21-20 @ 08:53]  TSH 4.69      [07-06-22 @ 18:36]      Syphilis Screen (Treponema Pallidum Ab) Negative      [06-27-22 @ 02:00]    RADIOLOGY & ADDITIONAL STUDIES:

## 2022-07-17 NOTE — PROGRESS NOTE ADULT - PROBLEM SELECTOR PLAN 1
Test BG AC/HS  -c/w Lantus 4 units QHS for now.   -Increase ademelog 3 unit premeals (hold if not eating)  -low Admelog to low correction scale AC meals and Low HS scale  -Please inform endo team of any changes on steroid therapy or PO/TF status  -cons carb diet  -Will follow and adjust as needed    Discharge  - Likely discharge on basal bolus insulin, Plan TBD based on insulin requirements at DC.   Outpatient f/u with Endocrinologist Dr. Lincoln. [address] [facility] [address]. Phone  Needs apt at time of discharge  -Needs optho/renal/cardiac f/u as out pt  -Make sure pt has insulin sand DM supplies at time of discharge

## 2022-07-17 NOTE — PROGRESS NOTE ADULT - ASSESSMENT
66M with h/o DM type II, HTN, CAD s/p CABG in 2020, Afib on Eliquis, CVA in 2019 due to Afib, Seizure d/o (last episode was on 4/8/22), h/o GI bleed in 2/2022 EGD with duodenal ulcer and ESRD on HD s/p R DDRT from DCD donor on 4/21/22 complicated by DGF requiring HD until 4/29/22 now with good graft function who presented with status epilepticus in setting of UTI/antibiotics and sub-therapeutic valproic acid level     Seizure Disorder:  - Repeat MRI head 6/27 with less concerns for PRES, likely ischemic changes  - Remains seizure free  - Antiepileptic regimen per Neurology, increased Phenytoin to 100/100/60 per neuro yesterday; level today 4.2, made neuro aware of level this AM; awaiting recs  - Keep Mag > 2      R DCD DDRT (ureter stented) 4/21/22:  - Excellent graft function  - Klebsiella UTI (post stent removal): on Ertapenem x 3D per Transplant ID.  Cleared by Neuro/Pharmacy. Last day today.   - S/P removal of ureteral stent on 7/12.   - Strict I/Os, Continue Doxazosin/Proscar. Increase doxazosin to 4mg BID. Monitor UOP   - DM diet as tolerated with aspiration precautions  - ENT: post extubation trauma/post cricoid edema 2/2 LPRD. Vocal cords good. Reflux precautions (no spicy food and caffeine once has diet). Voice rest.  - OOB with RN/PT    Immunosuppression:   - Belatacept: 6/23, 7/4 (initial loading). Re-loaded 7/8 as there was a gap between doses 2/2 seizures. next dose 7/18  - Cellcept to 500 BID (1/2 dose given ESBL Kleb UTI)  - continue Pred 5  - PPx: Valcyte/PPI/nystatin/Bactrim    DM  - on Lantus/Lispro, Endocrine following     AFib:  - continue Retacrit weekly  - Eliquis with ~40% less efficacy while on fosphenytoin.  On Lovenox 60BID. Plan to continue switch to Coumadin once discharged home from rehab. Plan discussed with neurology and family  - rate controlled    R IJ DVT  - On Lovenox 60BID    HTN:  - Nifedipine./Hydralazine/Cardura. Increase Cardura to 4mg BID for better BP control    DISPO  -OK this week  -to f/u with Dr. Andre Patterson, Epilepsy team upon d/c     66M with h/o DM type II, HTN, CAD s/p CABG in 2020, Afib on Eliquis, CVA in 2019 due to Afib, Seizure d/o (last episode was on 4/8/22), h/o GI bleed in 2/2022 EGD with duodenal ulcer and ESRD on HD s/p R DDRT from DCD donor on 4/21/22 complicated by DGF requiring HD until 4/29/22 now with good graft function who presented with status epilepticus in setting of UTI/antibiotics and sub-therapeutic valproic acid level     Seizure Disorder:  - Repeat MRI head 6/27 with less concerns for PRES, likely ischemic changes  - Remains seizure free  - Antiepileptic regimen per Neurology, increased Phenytoin to 100/100/60 per neuro yesterday; level today 4.2, made neuro aware of level this AM; awaiting recs  - Keep Mag > 2      R DCD DDRT (ureter stented) 4/21/22:  - Good graft function; will give 500cc bolus for slight uptrend in Cr.   - Klebsiella UTI (post stent removal): on Ertapenem x 3D per Transplant ID.  Cleared by Neuro/Pharmacy. Last day today.   - S/P removal of ureteral stent on 7/12.   - Strict I/Os, Continue Doxazosin/Proscar. Increase doxazosin to 4mg BID. Monitor UOP   - DM diet as tolerated with aspiration precautions  - ENT: post extubation trauma/post cricoid edema 2/2 LPRD. Vocal cords good. Reflux precautions (no spicy food and caffeine once has diet). Voice rest.  - OOB with RN/PT    Immunosuppression:   - Belatacept: 6/23, 7/4 (initial loading). Re-loaded 7/8 as there was a gap between doses 2/2 seizures. next dose 7/18  - Cellcept to 500 BID (1/2 dose given ESBL Kleb UTI)  - continue Pred 5  - PPx: Valcyte/PPI/nystatin/Bactrim    DM  - on Lantus/Lispro, Endocrine following     AFib:  - continue Retacrit weekly  - Eliquis with ~40% less efficacy while on fosphenytoin.  On Lovenox 60BID. Plan to continue switch to Coumadin once discharged home from rehab. Plan discussed with neurology and family  - rate controlled    R IJ DVT  - On Lovenox 60BID    HTN:  - Nifedipine./Hydralazine/Cardura. Increase Cardura to 4mg BID for better BP control    DISPO  -OK this week  -to f/u with Dr. Andre Patterson, Epilepsy team upon d/c

## 2022-07-17 NOTE — PROGRESS NOTE ADULT - SUBJECTIVE AND OBJECTIVE BOX
[facility] DIVISION OF KIDNEY DISEASES AND HYPERTENSION -- FOLLOW UP NOTE  --------------------------------------------------------------------------------  Authored by: Ozzie Nova   Cell # [phone]     CHAD DUNBAR was seen and examined at bedside.   Patient is a 66y old  Male who presents with a chief complaint of Status Epilepticus (07-16-22)    24 hour events/subjective: Received fosphenytoin load for low level, dose increased.  Eating well. Not able to get out of bed but using less assistance. Doing well overall. Afebrile.       Standing Inpatient Medications  atorvastatin 40 milliGRAM(s) Oral at bedtime  chlorhexidine 2% Cloths 1 Application(s) Topical <User Schedule>  doxazosin 4 milliGRAM(s) Oral <User Schedule>  enoxaparin Injectable 60 milliGRAM(s) SubCutaneous every 12 hours  epoetin cortney-epbx (RETACRIT) Injectable 62083 Unit(s) SubCutaneous every 7 days   ertapenem  IVPB 1000 milliGRAM(s) IV Intermittent every 24 hours  finasteride 5 milliGRAM(s) Oral daily  hydrALAZINE 100 milliGRAM(s) Oral every 8 hours  insulin glargine Injectable (LANTUS) 4 Unit(s) SubCutaneous at bedtime  insulin lispro (ADMELOG) corrective regimen sliding scale   SubCutaneous three times a day with meals  insulin lispro (ADMELOG) corrective regimen sliding scale   SubCutaneous at bedtime  insulin lispro Injectable (ADMELOG) 1 Unit(s) SubCutaneous three times a day before meals  levothyroxine 50 MICROGram(s) Oral daily  melatonin 6 milliGRAM(s) Oral at bedtime  mycophenolate mofetil Suspension 500 milliGRAM(s) Oral <User Schedule>  NIFEdipine XL 60 milliGRAM(s) Oral two times a day  nystatin    Suspension 577879 Unit(s) Oral four times a day  pantoprazole    Tablet 40 milliGRAM(s) Oral daily  phenytoin   Capsule 100 milliGRAM(s) Oral <User Schedule>  phenytoin   Capsule 60 milliGRAM(s) Oral <User Schedule>  polyethylene glycol 3350 17 Gram(s) Oral daily  predniSONE  Solution 5 milliGRAM(s) Oral daily  sodium chloride 0.9% Bolus 500 milliLiter(s) IV Bolus once  sodium phosphate IVPB 15 milliMole(s) IV Intermittent once  trimethoprim   80 mG/sulfamethoxazole 400 mG 1 Tablet(s) Oral daily  valGANciclovir 450 milliGRAM(s) Oral daily    PRN Inpatient Medications  acetaminophen     Tablet .. 650 milliGRAM(s) Oral every 6 hours PRN        VITALS/PHYSICAL EXAM  --------------------------------------------------------------------------------  T(C): 37.1 (07-17-22 @ 08:43), Max: 37.1 (07-17-22 @ 08:43)  HR: 74 (07-17-22 @ 08:43) (73 - 78)  BP: 157/66 (07-17-22 @ 08:43) (141/65 - 170/74)  RR: 20 (07-17-22 @ 08:43) (20 - 20)  SpO2: 95% (07-17-22 @ 08:43) (95% - 100%)      07-16-22 @ 07:01  -  07-17-22 @ 07:00  --------------------------------------------------------  IN: 1110 mL / OUT: 900 mL / NET: 210 mL      Physical Exam:    Awake and alert  Comfortably resting in bed  Lungs clear   Soft abdomen, RLQ graft non tender  No edema     LABS/STUDIES  --------------------------------------------------------------------------------              8.7    2.75  >-----------<  255      [07-17-22 @ 06:25]              27.2     131  |  99  |  26  ----------------------------<  148      [07-17-22 @ 06:25]  4.6   |  23  |  1.26        Ca     8.1     [07-17-22 @ 06:25]      Mg     1.8     [07-17-22 @ 06:25]      Phos  2.3     [07-17-22 @ 06:25]    TPro  5.6  /  Alb  2.1  /  TBili  0.2  /  DBili  x   /  AST  17  /  ALT  7   /  AlkPhos  130  [07-17-22 @ 06:25]    PT/INR: PT 13.0 , INR 1.13       [07-17-22 @ 06:25]  PTT: 28.6       [07-17-22 @ 06:25]      Creatinine Trend:  SCr 1.26 [07-17 @ 06:25]  SCr 1.21 [07-16 @ 06:19]  SCr 1.18 [07-15 @ 07:42]  SCr 1.08 [07-14 @ 06:32]  SCr 1.10 [07-13 @ 06:26]      CAPILLARY BLOOD GLUCOSE  POCT Blood Glucose.: 182 mg/dL (17 Jul 2022 08:19)  POCT Blood Glucose.: 226 mg/dL (16 Jul 2022 21:22)  POCT Blood Glucose.: 304 mg/dL (16 Jul 2022 17:39)  POCT Blood Glucose.: 258 mg/dL (16 Jul 2022 11:43)      Urinalysis - [07-12-22 @ 23:55]      Color Yellow / Appearance Slightly Turbid / SG 1.020 / pH 6.0      Gluc Trace / Ketone Negative  / Bili Negative / Urobili Negative       Blood Negative / Protein 30 mg/dL / Leuk Est Large / Nitrite Positive      RBC 6 /  / Hyaline 11 / Gran  / Sq Epi  / Non Sq Epi 1 / Bacteria Negative

## 2022-07-17 NOTE — CHART NOTE - NSCHARTNOTEFT_GEN_A_CORE
Neurology    Reviewed phenytoin level in AM. Serum level 4.2, albumin 2.1, correction at 6.2. Discussed with neurology attending, will continue phenytoin PO at 100mg/100mg/60mg for now and will recheck AM level as well as albumin level tomorrow. Will continue to follow. Neurology    Reviewed phenytoin level in AM. Serum level 4.2, albumin 2.1, correction at 6.2. Discussed with neurology attending, will continue phenytoin PO at 100mg/100mg/50mg for now and will recheck AM level as well as albumin level tomorrow. Will continue to follow.

## 2022-07-17 NOTE — PROGRESS NOTE ADULT - SUBJECTIVE AND OBJECTIVE BOX
Diabetes Follow up note:    Chief complaint: T2DM    Interval Hx: BG values 100-300s over past 24 hours. Pt seen at bedside w/family present. Reports improved PO intake. DC planning to rehab ongoing.     Review of Systems:  General: denies pain  GI: Tolerating POs. Denies N/V/D/Abd pain  CV: Denies CP/SOB  ENDO: No S&Sx of hypoglycemia    MEDS:  atorvastatin 40 milliGRAM(s) Oral at bedtime  finasteride 5 milliGRAM(s) Oral daily  insulin glargine Injectable (LANTUS) 4 Unit(s) SubCutaneous at bedtime  insulin lispro (ADMELOG) corrective regimen sliding scale   SubCutaneous three times a day with meals  insulin lispro (ADMELOG) corrective regimen sliding scale   SubCutaneous at bedtime  insulin lispro Injectable (ADMELOG) 3 Unit(s) SubCutaneous three times a day before meals  levothyroxine 50 MICROGram(s) Oral daily  predniSONE  Solution 5 milliGRAM(s) Oral daily    ertapenem  IVPB      ertapenem  IVPB 1000 milliGRAM(s) IV Intermittent every 24 hours  nystatin    Suspension 908406 Unit(s) Oral four times a day  trimethoprim   80 mG/sulfamethoxazole 400 mG 1 Tablet(s) Oral daily  valGANciclovir 450 milliGRAM(s) Oral daily    Allergies    No Known Allergies        PE:  General: Male sitting in chair. NAD.   Vital Signs Last 24 Hrs  T(C): 37.1 (17 Jul 2022 08:43), Max: 37.1 (17 Jul 2022 08:43)  T(F): 98.7 (17 Jul 2022 08:43), Max: 98.7 (17 Jul 2022 08:43)  HR: 74 (17 Jul 2022 08:43) (73 - 78)  BP: 157/66 (17 Jul 2022 08:43) (141/65 - 170/74)  BP(mean): --  RR: 20 (17 Jul 2022 08:43) (20 - 20)  SpO2: 95% (17 Jul 2022 08:43) (95% - 100%)    Parameters below as of 17 Jul 2022 08:43  Patient On (Oxygen Delivery Method): room air      Abd: Soft, NT,ND,   Extremities: Warm  Neuro: A&O X3    LABS:  POCT Blood Glucose.: 182 mg/dL (07-17-22 @ 08:19)  POCT Blood Glucose.: 226 mg/dL (07-16-22 @ 21:22)  POCT Blood Glucose.: 304 mg/dL (07-16-22 @ 17:39)  POCT Blood Glucose.: 258 mg/dL (07-16-22 @ 11:43)  POCT Blood Glucose.: 134 mg/dL (07-16-22 @ 08:32)  POCT Blood Glucose.: 191 mg/dL (07-15-22 @ 21:35)  POCT Blood Glucose.: 236 mg/dL (07-15-22 @ 17:27)  POCT Blood Glucose.: 222 mg/dL (07-15-22 @ 13:13)  POCT Blood Glucose.: 130 mg/dL (07-15-22 @ 08:24)  POCT Blood Glucose.: 204 mg/dL (07-14-22 @ 21:20)  POCT Blood Glucose.: 208 mg/dL (07-14-22 @ 17:22)                            8.7    2.75  )-----------( 255      ( 17 Jul 2022 06:25 )             27.2       07-17    131<L>  |  99  |  26<H>  ----------------------------<  148<H>  4.6   |  23  |  1.26    eGFR: 63    Ca    8.1<L>      07-17  Mg     1.8     07-17  Phos  2.3     07-17    TPro  5.6<L>  /  Alb  2.1<L>  /  TBili  0.2  /  DBili  x   /  AST  17  /  ALT  7<L>  /  AlkPhos  130<H>  07-17      Thyroid Function Tests:  07-06 @ 18:36 TSH 4.69 FreeT4 -- T3 43 Anti TPO -- Anti Thyroglobulin Ab -- TSI --      A1C with Estimated Average Glucose Result: 6.0 % (06-30-22 @ 05:49)  A1C with Estimated Average Glucose Result: 6.6 % (04-24-22 @ 17:12)  A1C with Estimated Average Glucose Result: 7.3 % (02-04-22 @ 13:42)  A1C with Estimated Average Glucose Result: 7.2 % (02-04-22 @ 05:48)          Contact number: serge [phone] or [phone]

## 2022-07-17 NOTE — PROGRESS NOTE ADULT - NUTRITIONAL ASSESSMENT
Diet, Consistent Carbohydrate Renal/No Snacks:   Supplement Feeding Modality:  Oral  Nepro Cans or Servings Per Day:  2       Frequency:  Daily (07-14-22 @ 19:50) [Active]

## 2022-07-17 NOTE — PROGRESS NOTE ADULT - ASSESSMENT
66 yr old Male with PMHx ESRD s/p permacath removal 5/10/22 s/p Rt DDRT 4/21/22,  CVA (2019), Afib on apixaban, seizure activity, CAD s/p stents, CABG (2020), HTN, HLD, DM on insulin, gastric/duodenal ulcer, recent hospitalization 4/28/22 -5/10/22 with weakness/anemia found to have perinephric hematoma requiring evacuation and repair of bleeding arterial anastomosis. Curently being treated for UTI with Levaquin Today after developing multiple sz episodes refractory to 5 mg versed IM (EMS) and 2 mg ativan IV in E.D. concerning for status epilepticus requiring intubation for hypoxic respiratory  failure. MICU Consult was called for hypoxic respiratory failure secondary to status epilepticus.  Nephrology consulted for renal failure.     A/P  DDRT on 04/21/22  Course complicated by DGF, was on HD until 4/29/22. Readmitted in May for anemia in the setting of large perinephric hematoma s/p evacuation and repair of arterial anastomosis on 5/2/22. Intra operative biopsy showed no rejection.  ANA likely sec to  hemodynamic change. Stable and resolving.   stent removal 07/12/22  scr remains stable  monitor BMP, U/O     History of Hyperkalemia  Secondary to Bactrim now resolved off Bactrim.    HTN   suboptimal   -160, Norvasc changed to Nifedipine 7/15, increased.   manage by transplant team    monitor BP closely     UTI  Urine culture with ESBL E.coli.  cefepime changed to ertapenem.     Immunosuppression per transplant team  Induction:  Thymoglobulin           Envarsus discontinued for possible neurotoxicity. Continue     Belatacept, next dose on 7/18      MMF 500mg po BID decreased because of cytopenia and UTI      Prednisone 5mg po daily       For PCP prophylaxis,   Transplant team changed atovaquone to bactrim as hyperkalemia has resolved,     CMV prophylaxis.   continue Valcyte 450mg po daily (CMV intermediate risk)

## 2022-07-18 NOTE — PROGRESS NOTE ADULT - SUBJECTIVE AND OBJECTIVE BOX
patient seen this morning, eating breakfast, reports weakness in arms, hands, right more than left     REVIEW OF SYSTEMS  Constitutional - No fever,  No fatigue  HEENT - No vertigo, No neck pain  Neurological - No headaches, No memory loss, No loss of strength, No numbness, No tremors  Skin - No rashes, No lesions   Musculoskeletal - No joint pain, No joint swelling, No muscle pain  Psychiatric - No depression, No anxiety    FUNCTIONAL PROGRESS   SLP/MBS  functional oropharyngeal swallow  regular diet/thin liquids     7/15 PT  transfers min assist with jena steady     VITALS  T(C): 36.9 (22 @ 08:55), Max: 36.9 (22 @ 05:00)  HR: 74 (22 @ 08:55) (70 - 79)  BP: 146/70 (22 @ 08:55) (146/70 - 167/70)  RR: 20 (22 @ 08:55) (18 - 20)  SpO2: 98% (22 @ 08:55) (97% - 100%)  Wt(kg): --    MEDICATIONS   acetaminophen     Tablet .. 650 milliGRAM(s) every 6 hours PRN  atorvastatin 40 milliGRAM(s) at bedtime  chlorhexidine 2% Cloths 1 Application(s) <User Schedule>  doxazosin 4 milliGRAM(s) <User Schedule>  enoxaparin Injectable 60 milliGRAM(s) every 12 hours  epoetin cortney-epbx (RETACRIT) Injectable 19726 Unit(s) every 7 days  finasteride 5 milliGRAM(s) daily  hydrALAZINE 100 milliGRAM(s) every 8 hours  insulin glargine Injectable (LANTUS) 4 Unit(s) at bedtime  insulin lispro (ADMELOG) corrective regimen sliding scale   three times a day with meals  insulin lispro (ADMELOG) corrective regimen sliding scale   at bedtime  insulin lispro Injectable (ADMELOG) 3 Unit(s) three times a day before meals  levothyroxine 50 MICROGram(s) daily  melatonin 6 milliGRAM(s) at bedtime  mycophenolate mofetil Suspension 500 milliGRAM(s) <User Schedule>  NIFEdipine XL 60 milliGRAM(s) two times a day  nystatin    Suspension 677358 Unit(s) four times a day  pantoprazole    Tablet 40 milliGRAM(s) daily  phenytoin   Capsule 100 milliGRAM(s) <User Schedule>  phenytoin   Capsule 60 milliGRAM(s) <User Schedule>  polyethylene glycol 3350 17 Gram(s) daily  predniSONE  Solution 5 milliGRAM(s) daily  trimethoprim   80 mG/sulfamethoxazole 400 mG 1 Tablet(s) daily  valGANciclovir 450 milliGRAM(s) daily      RECENT LABS - Reviewed                        8.5    2.57  )-----------( 265      ( 2022 06:11 )             27.4     07-18    129<L>  |  98  |  30<H>  ----------------------------<  145<H>  4.5   |  23  |  1.34<H>    Ca    8.2<L>      2022 06:09  Phos  2.2     07-18  Mg     2.0     07-18    TPro  5.5<L>  /  Alb  2.1<L>  /  TBili  0.2  /  DBili  x   /  AST  14  /  ALT  8<L>  /  AlkPhos  127<H>  07-18    PT/INR - ( 2022 06:11 )   PT: 13.1 sec;   INR: 1.13 ratio         PTT - ( 2022 06:11 )  PTT:35.1 sec  Urinalysis Basic - ( 2022 10:17 )    Color: Light Yellow / Appearance: Clear / S.018 / pH: x  Gluc: x / Ketone: Negative  / Bili: Negative / Urobili: Negative   Blood: x / Protein: 30 mg/dL / Nitrite: Negative   Leuk Esterase: Negative / RBC: 3 /hpf / WBC 2 /HPF   Sq Epi: x / Non Sq Epi: 1 /hpf / Bacteria: Negative        ----------------------------------------------------------------------------------------  PHYSICAL EXAM  Constitutional - NAD, Comfortable, in chair   Chest - Breathing comfortably  Cardiovascular - S1S2   Abdomen - Soft   Extremities - No C/C/E, No calf tenderness   Neurologic Exam -  slow processing, follows commands                   Cognitive - Awake, Alert, AAO to self, place, year     Communication - Fluent, No dysarthria        Motor - 4/5     Sensory - Intact to LT    Psychiatric - Mood stable, Affect WNL  ----------------------------------------------------------------------------------------  ASSESSMENT/PLAN  66yMale h/o DM type II, HTN, CAD s/p CABG in , Afib on Eliquis, CVA in  due to Afib, Seizure d/o (last episode was on 22), h/o GI bleed in 2022 EGD with duodenal ulcer and ESRD on HD s/p R DDRT with functional deficits after UTI, status epilepticus, with debility   seizures, on fosphenytoin  dysphagia, s/p MBS, on regular diet   Pain - Tylenol  Rt IJ DVT,  lovenox   Rehab -   Recommend ACUTE inpatient rehabilitation for the functional deficits consisting of 3 hours of therapy/day & 24 hour RN/daily PMR physician for comorbid medical management. Will continue to follow for ongoing rehab needs and recommendations. Patient will be able to tolerate 3 hours a day.    Continue bedside therapy as well as OOB throughout the day with mobilization throughout the day with staff to maintain cardiopulmonary function and prevention of secondary complications related to debility.           patient seen this morning, eating breakfast, reports weakness in arms, hands, right more than left     REVIEW OF SYSTEMS  Constitutional - No fever,  No fatigue  HEENT - No vertigo, No neck pain  Neurological - No headaches, No memory loss, No loss of strength, No numbness, No tremors  Skin - No rashes, No lesions   Musculoskeletal - No joint pain, No joint swelling, No muscle pain  Psychiatric - No depression, No anxiety    FUNCTIONAL PROGRESS   SLP/MBS  functional oropharyngeal swallow  regular diet/thin liquids     7/15 PT  transfers min assist with jena steady     VITALS  T(C): 36.9 (22 @ 08:55), Max: 36.9 (22 @ 05:00)  HR: 74 (22 @ 08:55) (70 - 79)  BP: 146/70 (22 @ 08:55) (146/70 - 167/70)  RR: 20 (22 @ 08:55) (18 - 20)  SpO2: 98% (22 @ 08:55) (97% - 100%)  Wt(kg): --    MEDICATIONS   acetaminophen     Tablet .. 650 milliGRAM(s) every 6 hours PRN  atorvastatin 40 milliGRAM(s) at bedtime  chlorhexidine 2% Cloths 1 Application(s) <User Schedule>  doxazosin 4 milliGRAM(s) <User Schedule>  enoxaparin Injectable 60 milliGRAM(s) every 12 hours  epoetin cortney-epbx (RETACRIT) Injectable 76323 Unit(s) every 7 days  finasteride 5 milliGRAM(s) daily  hydrALAZINE 100 milliGRAM(s) every 8 hours  insulin glargine Injectable (LANTUS) 4 Unit(s) at bedtime  insulin lispro (ADMELOG) corrective regimen sliding scale   three times a day with meals  insulin lispro (ADMELOG) corrective regimen sliding scale   at bedtime  insulin lispro Injectable (ADMELOG) 3 Unit(s) three times a day before meals  levothyroxine 50 MICROGram(s) daily  melatonin 6 milliGRAM(s) at bedtime  mycophenolate mofetil Suspension 500 milliGRAM(s) <User Schedule>  NIFEdipine XL 60 milliGRAM(s) two times a day  nystatin    Suspension 081337 Unit(s) four times a day  pantoprazole    Tablet 40 milliGRAM(s) daily  phenytoin   Capsule 100 milliGRAM(s) <User Schedule>  phenytoin   Capsule 60 milliGRAM(s) <User Schedule>  polyethylene glycol 3350 17 Gram(s) daily  predniSONE  Solution 5 milliGRAM(s) daily  trimethoprim   80 mG/sulfamethoxazole 400 mG 1 Tablet(s) daily  valGANciclovir 450 milliGRAM(s) daily      RECENT LABS - Reviewed                        8.5    2.57  )-----------( 265      ( 2022 06:11 )             27.4     07-18    129<L>  |  98  |  30<H>  ----------------------------<  145<H>  4.5   |  23  |  1.34<H>    Ca    8.2<L>      2022 06:09  Phos  2.2     07-18  Mg     2.0     07-18    TPro  5.5<L>  /  Alb  2.1<L>  /  TBili  0.2  /  DBili  x   /  AST  14  /  ALT  8<L>  /  AlkPhos  127<H>  07-18    PT/INR - ( 2022 06:11 )   PT: 13.1 sec;   INR: 1.13 ratio         PTT - ( 2022 06:11 )  PTT:35.1 sec  Urinalysis Basic - ( 2022 10:17 )    Color: Light Yellow / Appearance: Clear / S.018 / pH: x  Gluc: x / Ketone: Negative  / Bili: Negative / Urobili: Negative   Blood: x / Protein: 30 mg/dL / Nitrite: Negative   Leuk Esterase: Negative / RBC: 3 /hpf / WBC 2 /HPF   Sq Epi: x / Non Sq Epi: 1 /hpf / Bacteria: Negative        ----------------------------------------------------------------------------------------  PHYSICAL EXAM  Constitutional - NAD, Comfortable, in chair   Chest - Breathing comfortably  Cardiovascular - S1S2   Abdomen - Soft   Extremities - No C/C/E, No calf tenderness   Neurologic Exam -  slow processing, follows commands                   Cognitive - Awake, Alert, AAO to self, place, year     Communication - Fluent, No dysarthria        Motor - 4/5     Sensory - Intact to LT    Psychiatric - Mood stable, Affect WNL  ----------------------------------------------------------------------------------------  ASSESSMENT/PLAN  66yMale h/o DM type II, HTN, CAD s/p CABG in , Afib on Eliquis, CVA in  due to Afib, Seizure d/o (last episode was on 22), h/o GI bleed in 2022 EGD with duodenal ulcer and ESRD on HD s/p R DDRT with functional deficits after UTI, status epilepticus, with debility   seizures, on fosphenytoin  dysphagia, s/p MBS, on regular diet   Pain - Tylenol  Rt IJ DVT,  lovenox   Rehab -   needs OT Follow up   Recommend ACUTE inpatient rehabilitation for the functional deficits consisting of 3 hours of therapy/day & 24 hour RN/daily PMR physician for comorbid medical management. Will continue to follow for ongoing rehab needs and recommendations. Patient will be able to tolerate 3 hours a day.    Continue bedside therapy as well as OOB throughout the day with mobilization throughout the day with staff to maintain cardiopulmonary function and prevention of secondary complications related to debility.

## 2022-07-18 NOTE — PROGRESS NOTE ADULT - ATTENDING COMMENTS
Pt feeling better slowly.  Creatinine is slowly uptrending as sodium is decreasing.  Send urine osm and urine electrolytes for w/u.    Cont belatacept, MMF 500mgs BID and prednisone.

## 2022-07-18 NOTE — CHART NOTE - NSCHARTNOTEFT_GEN_A_CORE
Diabetes Follow up note: non-billable visit    Chief complaint: T2DM    Interval Hx: BG values improved to 100s today. Awaiting acute rehab transfer.     MEDS:  atorvastatin 40 milliGRAM(s) Oral at bedtime  finasteride 5 milliGRAM(s) Oral daily  insulin glargine Injectable (LANTUS) 4 Unit(s) SubCutaneous at bedtime  insulin lispro (ADMELOG) corrective regimen sliding scale   SubCutaneous three times a day with meals  insulin lispro (ADMELOG) corrective regimen sliding scale   SubCutaneous at bedtime  insulin lispro Injectable (ADMELOG) 3 Unit(s) SubCutaneous three times a day before meals  levothyroxine 50 MICROGram(s) Oral daily  predniSONE  Solution 5 milliGRAM(s) Oral daily    nystatin    Suspension 191297 Unit(s) Oral four times a day  trimethoprim   80 mG/sulfamethoxazole 400 mG 1 Tablet(s) Oral daily  valGANciclovir 450 milliGRAM(s) Oral daily    Allergies    No Known Allergies      Vital Signs Last 24 Hrs  T(C): 36.6 (18 Jul 2022 12:54), Max: 36.9 (18 Jul 2022 05:00)  T(F): 97.9 (18 Jul 2022 12:54), Max: 98.5 (18 Jul 2022 08:55)  HR: 67 (18 Jul 2022 12:54) (67 - 79)  BP: 140/68 (18 Jul 2022 12:54) (140/68 - 167/70)  BP(mean): --  RR: 20 (18 Jul 2022 12:54) (18 - 20)  SpO2: 97% (18 Jul 2022 12:54) (97% - 100%)    Parameters below as of 18 Jul 2022 12:54  Patient On (Oxygen Delivery Method): room air      LABS:  POCT Blood Glucose.: 165 mg/dL (07-18-22 @ 12:25)  POCT Blood Glucose.: 182 mg/dL (07-18-22 @ 08:20)  POCT Blood Glucose.: 209 mg/dL (07-17-22 @ 21:22)  POCT Blood Glucose.: 285 mg/dL (07-17-22 @ 17:08)  POCT Blood Glucose.: 211 mg/dL (07-17-22 @ 12:41)  POCT Blood Glucose.: 182 mg/dL (07-17-22 @ 08:19)  POCT Blood Glucose.: 226 mg/dL (07-16-22 @ 21:22)  POCT Blood Glucose.: 304 mg/dL (07-16-22 @ 17:39)  POCT Blood Glucose.: 258 mg/dL (07-16-22 @ 11:43)  POCT Blood Glucose.: 134 mg/dL (07-16-22 @ 08:32)  POCT Blood Glucose.: 191 mg/dL (07-15-22 @ 21:35)  POCT Blood Glucose.: 236 mg/dL (07-15-22 @ 17:27)                            8.5    2.57  )-----------( 265      ( 18 Jul 2022 06:11 )             27.4       07-18    129<L>  |  98  |  30<H>  ----------------------------<  145<H>  4.5   |  23  |  1.34<H>    eGFR: 58<L>    Ca    8.2<L>      07-18  Mg     2.0     07-18  Phos  2.2     07-18    TPro  5.5<L>  /  Alb  2.1<L>  /  TBili  0.2  /  DBili  x   /  AST  14  /  ALT  8<L>  /  AlkPhos  127<H>  07-18      Thyroid Function Tests:  07-06 @ 18:36 TSH 4.69 FreeT4 -- T3 43 Anti TPO -- Anti Thyroglobulin Ab -- TSI --      A1C with Estimated Average Glucose Result: 6.0 % (06-30-22 @ 05:49)  A1C with Estimated Average Glucose Result: 6.6 % (04-24-22 @ 17:12)  A1C with Estimated Average Glucose Result: 7.3 % (02-04-22 @ 13:42)  A1C with Estimated Average Glucose Result: 7.2 % (02-04-22 @ 05:48)      A/P:  66 yr old M with Type 2 DM> unknown control due to skewed A1C 6.6% secondary to chronic anemia and s/p blood tx. On basal/bolus insulin therapy PTA. DM c/b ESRD s/p DDRT on 3/21, CVA, CAD s/p CABG, Afib on apixaban, seizure activity, HTN, HLD, h/o GI bleed in 2/2022 EGD with duodenal ulcer, discharged to [facility] rehab center after prior hospitalization, now re-admitted from [facility] for seizures c/f status epilepticus in setting of UTI. endocrine consulted for steroid induced hyperglycemia, now on home dose prednisone 5mg. Prolonged hospital course w/ ICU stay, previously intubated/extubated, previous seizures. S/p ureteral stent removal 7/12/22.   pt is now off tube feeds and tolerating PO diet.     T2DM:   -Test BG AC/HS  -Increase Lantus 5 units QHS for now.   -c/w ademelog 3 unit premeals (hold if not eating)  -low Admelog to low correction scale AC meals and Low HS scale  -Please inform endo team of any changes on steroid therapy or PO/TF status  -cons carb diet  -Will follow and adjust as needed    Discharge  - Acute rehab: continue current regimen. Adjust insulin doses as needed at acute rehab  Outpatient f/u with Endocrinologist Dr. Lincoln. [address]. Phone  Needs apt at time of discharge  -Needs optho/renal/cardiac f/u as out pt  -Make sure pt has insulin and DM supplies at time of discharge.      Can be reached via TEAMS/pager: [phone]   office:  [phone] (M-F 9a-5pm)               [phone] (nights/weekends)   [email or web address] password [email or web address]

## 2022-07-18 NOTE — DISCHARGE NOTE PROVIDER - CARE PROVIDER_API CALL
Andre Patterson (MD)  Clinical Neurophysiology; EEGEpilepsy; Neurology  [address], [address]  [place] [address]  Phone: [phone]  Fax: [phone]  Follow Up Time:     Padmini Lincoln ()  Internal Medicine  [address], [address]  [place] [address]  Phone: [phone]  Fax: [phone]  Follow Up Time:     Iker Alfred)  Internal Medicine; Nephrology  [address] [place] [address]  Phone: [phone]  Fax: [phone]  Follow Up Time:

## 2022-07-18 NOTE — DISCHARGE NOTE PROVIDER - NSDCFUADDAPPT_GEN_ALL_CORE_FT
Please contact the transplant clinic to schedule virtual f/u appointments.  Contact the transplant clinic for dosing of transplant immunosuppression medication  Please contact the transplant clinic to schedule virtual f/u appointments.  Contact the transplant clinic for dosing of transplant immunosuppression medication     -f/u with Endocrinology Dr. Lincoln on d/c   -f/u with Epilepsy team Dr. Andre Patterson on dc  -f/u with Cardiology on dc

## 2022-07-18 NOTE — PROGRESS NOTE ADULT - ASSESSMENT
66 yr old Male with PMHx ESRD s/p permacath removal 5/10/22 s/p Rt DDRT 4/21/22,  CVA (2019), Afib on apixaban, seizure activity, CAD s/p stents, CABG (2020), HTN, HLD, DM on insulin, gastric/duodenal ulcer, recent hospitalization 4/28/22 -5/10/22 with weakness/anemia found to have perinephric hematoma requiring evacuation and repair of bleeding arterial anastomosis. Curently being treated for UTI with Levaquin Today after developing multiple sz episodes refractory to 5 mg versed IM (EMS) and 2 mg ativan IV in E.D. concerning for status epilepticus requiring intubation for hypoxic respiratory  failure. MICU Consult was called for hypoxic respiratory failure secondary to status epilepticus.  Nephrology consulted for renal failure.     A/P  DDRT on 04/21/22  Course complicated by DGF, was on HD until 4/29/22. Readmitted in May for anemia in the setting of large perinephric hematoma s/p evacuation and repair of arterial anastomosis on 5/2/22. Intra operative biopsy showed no rejection.  ANA likely sec to  hemodynamic change. Stable and resolving.   stent removal 07/12/22  scr trending up  transplant team on board   monitor BMP, U/O     History of Hyperkalemia  Secondary to Bactrim now resolved off Bactrim.    HTN   optimal   manage by transplant team    monitor BP closely     UTI  Urine culture with ESBL E.coli.  cefepime changed to ertapenem.     Immunosuppression per transplant team  Induction:  Thymoglobulin           Envarsus discontinued for possible neurotoxicity. Continue     Belatacept, next dose on 7/18      MMF 500mg po BID decreased because of cytopenia and UTI      Prednisone 5mg po daily       For PCP prophylaxis,   Transplant team changed atovaquone to bactrim as hyperkalemia has resolved,     CMV prophylaxis.   continue Valcyte 450mg po daily (CMV intermediate risk)

## 2022-07-18 NOTE — PROGRESS NOTE ADULT - SUBJECTIVE AND OBJECTIVE BOX
[facility] DIVISION OF KIDNEY DISEASES AND HYPERTENSION   FOLLOW UP NOTE    --------------------------------------------------------------------------------  HPI: 66 yr old man with h/o ESRD due to DM on HD since 2019, s/p DDRT from DCD donor on 4/21/22 complicated by DGF requiring HD until 4/29/22.   PMH: DM type II, HTN, CAD s/p CABG in 2020, Afib on Eliquis, CVA in 2019 due to Afib, Seizure d/o following CVA, h/o GI bleed in 2/2022 EGD with duodenal ulcer.  Donor was 58 , KDPI 82%, DCD, single vessels and ureter, HLA mismatch 1, 2, 2. No DSA, cPRA 0%. CMV +/+    Hospitalized May 2022 for anemia in the setting of large perinephric hematoma s/p evacuation and repair of arterial anastomosis on 5/2/22. Intra operative biopsy showed no rejection.  H/o seizure d/o with last seizure episode prior to transplant was on 4/8/22. He is readmitted for status epilepticus in the setting of sub therapeutic valproic acid levels. Course complicated by respiratory failure, hemothorax and hemorrhagic shock following right thoracentesis done on 6/10 for effusion. S/p PRBC x 5 units and vasopressors, chest tube, followed by VATS.    Envarsus discontinued and started on belatacept due to concern for neurotoxicity. Allograft function is stable, mental status is slowly improving. He is on fosphenytoin with good control of seizures.     24 hour events/subjective: Pt. was seen and examined today. Overnight events  noted.       PAST HISTORY  --------------------------------------------------------------------------------  No significant changes to PMH, PSH, FHx, SHx, unless otherwise noted    ALLERGIES & MEDICATIONS  --------------------------------------------------------------------------------  Allergies    No Known Allergies    Intolerances      Standing Inpatient Medications  atorvastatin 40 milliGRAM(s) Oral at bedtime  chlorhexidine 2% Cloths 1 Application(s) Topical <User Schedule>  doxazosin 4 milliGRAM(s) Oral <User Schedule>  enoxaparin Injectable 60 milliGRAM(s) SubCutaneous every 12 hours  epoetin cortney-epbx (RETACRIT) Injectable 69914 Unit(s) SubCutaneous every 7 days  finasteride 5 milliGRAM(s) Oral daily  hydrALAZINE 100 milliGRAM(s) Oral every 8 hours  insulin glargine Injectable (LANTUS) 4 Unit(s) SubCutaneous at bedtime  insulin lispro (ADMELOG) corrective regimen sliding scale   SubCutaneous three times a day with meals  insulin lispro (ADMELOG) corrective regimen sliding scale   SubCutaneous at bedtime  insulin lispro Injectable (ADMELOG) 3 Unit(s) SubCutaneous three times a day before meals  levothyroxine 50 MICROGram(s) Oral daily  melatonin 6 milliGRAM(s) Oral at bedtime  mycophenolate mofetil Suspension 500 milliGRAM(s) Oral <User Schedule>  NIFEdipine XL 60 milliGRAM(s) Oral two times a day  nystatin    Suspension 570874 Unit(s) Oral four times a day  pantoprazole    Tablet 40 milliGRAM(s) Oral daily  phenytoin   Capsule 100 milliGRAM(s) Oral <User Schedule>  phenytoin   Capsule 60 milliGRAM(s) Oral <User Schedule>  polyethylene glycol 3350 17 Gram(s) Oral daily  predniSONE  Solution 5 milliGRAM(s) Oral daily  trimethoprim   80 mG/sulfamethoxazole 400 mG 1 Tablet(s) Oral daily  valGANciclovir 450 milliGRAM(s) Oral daily    PRN Inpatient Medications  acetaminophen     Tablet .. 650 milliGRAM(s) Oral every 6 hours PRN      REVIEW OF SYSTEMS  --------------------------------------------------------------------------------  Gen: No fevers/chills  Head/Eyes/Ears: No HA   Respiratory: No dyspnea, cough  CV: No chest pain  GI: No abdominal pain, diarrhea  : No dysuria, hematuria  MSK: No  edema  Skin: No rashes  Heme: No easy bruising or bleeding    All other systems were reviewed and are negative, except as noted.    VITALS/PHYSICAL EXAM  --------------------------------------------------------------------------------  T(C): 36.9 (07-18-22 @ 08:55), Max: 36.9 (07-18-22 @ 05:00)  HR: 74 (07-18-22 @ 08:55) (70 - 79)  BP: 146/70 (07-18-22 @ 08:55) (146/70 - 167/70)  RR: 20 (07-18-22 @ 08:55) (18 - 20)  SpO2: 98% (07-18-22 @ 08:55) (97% - 100%)  Wt(kg): --      07-17-22 @ 07:01  -  07-18-22 @ 07:00  --------------------------------------------------------  IN: 1710 mL / OUT: 1350 mL / NET: 360 mL    07-18-22 @ 07:01  -  07-18-22 @ 10:01  --------------------------------------------------------  IN: 0 mL / OUT: 100 mL / NET: -100 mL      Physical Exam:  	Gen: NAD  	HEENT: Anicteric  	Pulm: CTA B/L  	CV: S1S2+  	Abd: Soft, +BS               Transplant site: RLQ non tender, well healed surgical scar+  	Ext: No LE edema B/L  	Neuro: Awake  	Skin: Warm and dry    LABS/STUDIES  --------------------------------------------------------------------------------              8.5    2.57  >-----------<  265      [07-18-22 @ 06:11]              27.4     129  |  98  |  30  ----------------------------<  145      [07-18-22 @ 06:09]  4.5   |  23  |  1.34        Ca     8.2     [07-18-22 @ 06:09]      Mg     2.0     [07-18-22 @ 06:09]      Phos  2.2     [07-18-22 @ 06:09]    Creatinine Trend:  SCr 1.34 [07-18 @ 06:09]  SCr 1.26 [07-17 @ 06:25]  SCr 1.21 [07-16 @ 06:19]  SCr 1.18 [07-15 @ 07:42]  SCr 1.08 [07-14 @ 06:32]    Urinalysis - [07-12-22 @ 23:55]      Color Yellow / Appearance Slightly Turbid / SG 1.020 / pH 6.0      Gluc Trace / Ketone Negative  / Bili Negative / Urobili Negative       Blood Negative / Protein 30 mg/dL / Leuk Est Large / Nitrite Positive      RBC 6 /  / Hyaline 11 / Gran  / Sq Epi  / Non Sq Epi 1 / Bacteria Negative    Syphilis Screen (Treponema Pallidum Ab) Negative      [06-27-22 @ 02:00]    CMV PCRCMVPCR Log: NotDetec Fbm84NN/mL (06-26 @ 18:14)  CMVPCR Log: NotDetec Myl28DR/mL (06-25 @ 14:08)    Parvo PCRParvovirus B19 DNA by PCR: NotDetec IU/mL (06-26 @ 18:14)   [facility] DIVISION OF KIDNEY DISEASES AND HYPERTENSION   FOLLOW UP NOTE    --------------------------------------------------------------------------------  HPI: 66 yr old man with h/o ESRD due to DM on HD since 2019, s/p DDRT from DCD donor on 4/21/22 complicated by DGF requiring HD until 4/29/22.   PMH: DM type II, HTN, CAD s/p CABG in 2020, Afib on Eliquis, CVA in 2019 due to Afib, Seizure d/o following CVA, h/o GI bleed in 2/2022 EGD with duodenal ulcer.  Donor was 57 , KDPI 82%, DCD, single vessels and ureter, HLA mismatch 1, 2, 2. No DSA, cPRA 0%. CMV +/+    Hospitalized May 2022 for anemia in the setting of large perinephric hematoma s/p evacuation and repair of arterial anastomosis on 5/2/22. Intra operative biopsy showed no rejection.  H/o seizure d/o with last seizure episode prior to transplant was on 4/8/22. He is readmitted for status epilepticus in the setting of sub therapeutic valproic acid levels. Course complicated by respiratory failure, hemothorax and hemorrhagic shock following right thoracentesis done on 6/10 for effusion. S/p PRBC x 5 units and vasopressors, chest tube, followed by VATS.    Envarsus discontinued and started on belatacept due to concern for neurotoxicity. Allograft function is stable, mental status is slowly improving. He is on fosphenytoin with good control of seizures.     24 hour events/subjective: Pt. was seen and examined today, reports feeling okay. Able to tolerate PO intake. Scr slowly uptrending to 1.34 and SNa downtrending to 129 today.     PAST HISTORY  --------------------------------------------------------------------------------  No significant changes to PMH, PSH, FHx, SHx, unless otherwise noted    ALLERGIES & MEDICATIONS  --------------------------------------------------------------------------------  Allergies    No Known Allergies    Intolerances      Standing Inpatient Medications  atorvastatin 40 milliGRAM(s) Oral at bedtime  chlorhexidine 2% Cloths 1 Application(s) Topical <User Schedule>  doxazosin 4 milliGRAM(s) Oral <User Schedule>  enoxaparin Injectable 60 milliGRAM(s) SubCutaneous every 12 hours  epoetin cortney-epbx (RETACRIT) Injectable 78446 Unit(s) SubCutaneous every 7 days  finasteride 5 milliGRAM(s) Oral daily  hydrALAZINE 100 milliGRAM(s) Oral every 8 hours  insulin glargine Injectable (LANTUS) 4 Unit(s) SubCutaneous at bedtime  insulin lispro (ADMELOG) corrective regimen sliding scale   SubCutaneous three times a day with meals  insulin lispro (ADMELOG) corrective regimen sliding scale   SubCutaneous at bedtime  insulin lispro Injectable (ADMELOG) 3 Unit(s) SubCutaneous three times a day before meals  levothyroxine 50 MICROGram(s) Oral daily  melatonin 6 milliGRAM(s) Oral at bedtime  mycophenolate mofetil Suspension 500 milliGRAM(s) Oral <User Schedule>  NIFEdipine XL 60 milliGRAM(s) Oral two times a day  nystatin    Suspension 646803 Unit(s) Oral four times a day  pantoprazole    Tablet 40 milliGRAM(s) Oral daily  phenytoin   Capsule 100 milliGRAM(s) Oral <User Schedule>  phenytoin   Capsule 60 milliGRAM(s) Oral <User Schedule>  polyethylene glycol 3350 17 Gram(s) Oral daily  predniSONE  Solution 5 milliGRAM(s) Oral daily  trimethoprim   80 mG/sulfamethoxazole 400 mG 1 Tablet(s) Oral daily  valGANciclovir 450 milliGRAM(s) Oral daily    PRN Inpatient Medications  acetaminophen     Tablet .. 650 milliGRAM(s) Oral every 6 hours PRN      REVIEW OF SYSTEMS  --------------------------------------------------------------------------------  Gen: No fevers/chills  Head/Eyes/Ears: No HA   Respiratory: No dyspnea, cough  CV: No chest pain  GI: No abdominal pain, diarrhea  : No dysuria, hematuria, as per HPI  MSK: No edema  Skin: No rashes  Heme: No easy bruising or bleeding    All other systems were reviewed and are negative, except as noted.    VITALS/PHYSICAL EXAM  --------------------------------------------------------------------------------  T(C): 36.9 (07-18-22 @ 08:55), Max: 36.9 (07-18-22 @ 05:00)  HR: 74 (07-18-22 @ 08:55) (70 - 79)  BP: 146/70 (07-18-22 @ 08:55) (146/70 - 167/70)  RR: 20 (07-18-22 @ 08:55) (18 - 20)  SpO2: 98% (07-18-22 @ 08:55) (97% - 100%)  Wt(kg): --      07-17-22 @ 07:01  -  07-18-22 @ 07:00  --------------------------------------------------------  IN: 1710 mL / OUT: 1350 mL / NET: 360 mL    07-18-22 @ 07:01  -  07-18-22 @ 10:01  --------------------------------------------------------  IN: 0 mL / OUT: 100 mL / NET: -100 mL      Physical Exam:  	Gen: NAD  	HEENT: Anicteric  	Pulm: CTA B/L  	CV: S1S2+  	Abd: Soft, +BS               Transplant site: RLQ non tender, well healed surgical scar+  	Ext: No LE edema B/L  	Neuro: Awake  	Skin: Warm and dry    LABS/STUDIES  --------------------------------------------------------------------------------              8.5    2.57  >-----------<  265      [07-18-22 @ 06:11]              27.4     129  |  98  |  30  ----------------------------<  145      [07-18-22 @ 06:09]  4.5   |  23  |  1.34        Ca     8.2     [07-18-22 @ 06:09]      Mg     2.0     [07-18-22 @ 06:09]      Phos  2.2     [07-18-22 @ 06:09]    Creatinine Trend:  SCr 1.34 [07-18 @ 06:09]  SCr 1.26 [07-17 @ 06:25]  SCr 1.21 [07-16 @ 06:19]  SCr 1.18 [07-15 @ 07:42]  SCr 1.08 [07-14 @ 06:32]    Urinalysis - [07-12-22 @ 23:55]      Color Yellow / Appearance Slightly Turbid / SG 1.020 / pH 6.0      Gluc Trace / Ketone Negative  / Bili Negative / Urobili Negative       Blood Negative / Protein 30 mg/dL / Leuk Est Large / Nitrite Positive      RBC 6 /  / Hyaline 11 / Gran  / Sq Epi  / Non Sq Epi 1 / Bacteria Negative    Syphilis Screen (Treponema Pallidum Ab) Negative      [06-27-22 @ 02:00]    CMV PCRCMVPCR Log: NotDetec Xij50VR/mL (06-26 @ 18:14)  CMVPCR Log: NotDetec Msz59ZT/mL (06-25 @ 14:08)    Parvo PCRParvovirus B19 DNA by PCR: NotDetec IU/mL (06-26 @ 18:14)

## 2022-07-18 NOTE — PROGRESS NOTE ADULT - SUBJECTIVE AND OBJECTIVE BOX
Transplant Surgery Multidisciplinary Progress Note   --------------------------------------------------------------  R DCD DDRT    4/21/22    Present: Patient seen and examined with multidisciplinary team including Transplant Surgeon: Dr. Mann/Ryan,  Nephrologist: Dr. Waqar Lomeli, WALDO [facility], Dr. Bhakta ([facility]), unit RN during am rounds.  Disciplines not in attendance will be notified of the plan.     HPI: 66M with PMH: DM type II, HTN, CAD s/p CABG in 2020, AFib on Eliquis, CVA in 2019 due to Afib, Seizure d/o following CVA, last episode was on 4/8/22, h/o GIB in 2/2022 EGD with duodenal ulcer.  ESRD due to DM was on HD since 2019.     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  Donor was 58 , KDPI 82%, DCD, single vessels and ureter, HLA mismatch 1, 2, 2. No DSA, cPRA 0%. 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 Re-admitted in May for anemia in the setting of large perinephric hematoma s/p evacuation and repair of arterial anastomosis on 5/2/22. Intra operative biopsy showed no rejection, Creatinine ranging ~2mg/dL.    In Rehab:  He was recently dx with klebsiella UTI rx with ertapenem - then switched to Levaquin day #6.    He also had parainfluenza pneumonia. Was at rehab progressing well.      Hospital course:  - 6/10: transferred from rehab facility for seizure status epilepticus. Intubated for respiratory protection and transferred to MICU.  EEG showed no seizure activity. Neuro consulted.   - 6/11: Extubated. Found to have R pleural effusion. s/p Thoracentesis 1.3L. Eliquis changed to heparin drip.  Post procedure drop in H&H. 5u PRBC, 2 u FFP.   - 6/11 evening: Transferred to SICU from MICU for further care in setting of hypoxia, significant R pleural effusion, anemia and hemodynamic instability  - 6/11 Intubated at 9pm and sedated on Precedex.  CT placed, 600ml blood drained.  1L total overnight, started pressors  - 6/11 Received Protamine for PTT >100 (was on Heparin drip started for AF), 2 u FFP and 5u PRBC total  - 6/13 s/p VATS 2.2L old blood removed; second chest tube placed  - 6/18-19: chest tubes removed  - 6/21: ?PRES vs. chronic ischemic changes on MRI, off Envarsus, switched to Belatacept 6/23 with good graft function  - 6/25: Tx to SICU for refractory seizures, improved with IV antiepileptic regimen  - 7/10: Passed bedside S&S in SICU. Downgraded from SICU to floor.   - 7/11: OR-->URETERAL STENT REMOVED  - 7/15: ESBL Kleb UTI (50-90K), completed Ertapenem x 3 Days    Interval Events:  - Afebrile, completed Ertapenem  - Making great progress daily. Tolerating PO, working with RN/PT  - GRACEOx3, conversant, in good spirits  - Having bowel function  - Prior global edema has now resolved  - graft with slight rise in Cr, ~1.3L UO daily without diuretics    Immunosuppression:   Induction:  Thymoglobulin                                        Maintenance:  - Belatacept: 6/23, 7/4 (initial loading).  Re-loaded 7/8 as there was a gap between doses 2/2 seizures. Next dose today  - MMF to 500 BID (leukopenia)  - Continue Pred 5  - Ongoing monitoring for signs of rejection.    Potential Discharge date: early this week    Education:  Medications    Plan of care:  See Below        MEDICATIONS  (STANDING):  atorvastatin 40 milliGRAM(s) Oral at bedtime  belatacept IVPB 625 milliGRAM(s) IV Intermittent once  chlorhexidine 2% Cloths 1 Application(s) Topical <User Schedule>  doxazosin 4 milliGRAM(s) Oral <User Schedule>  enoxaparin Injectable 60 milliGRAM(s) SubCutaneous every 12 hours  epoetin cortney-epbx (RETACRIT) Injectable 41997 Unit(s) SubCutaneous every 7 days  finasteride 5 milliGRAM(s) Oral daily  hydrALAZINE 100 milliGRAM(s) Oral every 8 hours  insulin glargine Injectable (LANTUS) 4 Unit(s) SubCutaneous at bedtime  insulin lispro (ADMELOG) corrective regimen sliding scale   SubCutaneous three times a day with meals  insulin lispro (ADMELOG) corrective regimen sliding scale   SubCutaneous at bedtime  insulin lispro Injectable (ADMELOG) 3 Unit(s) SubCutaneous three times a day before meals  levothyroxine 50 MICROGram(s) Oral daily  melatonin 6 milliGRAM(s) Oral at bedtime  mycophenolate mofetil Suspension 500 milliGRAM(s) Oral <User Schedule>  NIFEdipine XL 60 milliGRAM(s) Oral two times a day  nystatin    Suspension 631570 Unit(s) Oral four times a day  pantoprazole    Tablet 40 milliGRAM(s) Oral daily  phenytoin   Capsule 100 milliGRAM(s) Oral <User Schedule>  phenytoin   Capsule 60 milliGRAM(s) Oral <User Schedule>  polyethylene glycol 3350 17 Gram(s) Oral daily  predniSONE  Solution 5 milliGRAM(s) Oral daily  trimethoprim   80 mG/sulfamethoxazole 400 mG 1 Tablet(s) Oral daily  valGANciclovir 450 milliGRAM(s) Oral daily    MEDICATIONS  (PRN):  acetaminophen     Tablet .. 650 milliGRAM(s) Oral every 6 hours PRN Mild Pain (1 - 3)        Vital Signs Last 24 Hrs  T(C): 36.9 (18 Jul 2022 08:55), Max: 36.9 (18 Jul 2022 05:00)  T(F): 98.5 (18 Jul 2022 08:55), Max: 98.5 (18 Jul 2022 08:55)  HR: 74 (18 Jul 2022 08:55) (70 - 79)  BP: 146/70 (18 Jul 2022 08:55) (146/70 - 167/70)  BP(mean): --  RR: 20 (18 Jul 2022 08:55) (18 - 20)  SpO2: 98% (18 Jul 2022 08:55) (97% - 100%)    Parameters below as of 18 Jul 2022 08:55  Patient On (Oxygen Delivery Method): room air        I&O's Summary    17 Jul 2022 07:01  -  18 Jul 2022 07:00  --------------------------------------------------------  IN: 1710 mL / OUT: 1350 mL / NET: 360 mL    18 Jul 2022 07:01  -  18 Jul 2022 11:05  --------------------------------------------------------  IN: 460 mL / OUT: 220 mL / NET: 240 mL                              8.5    2.57  )-----------( 265      ( 18 Jul 2022 06:11 )             27.4     07-18    129<L>  |  98  |  30<H>  ----------------------------<  145<H>  4.5   |  23  |  1.34<H>    Ca    8.2<L>      18 Jul 2022 06:09  Phos  2.2     07-18  Mg     2.0     07-18    TPro  5.5<L>  /  Alb  2.1<L>  /  TBili  0.2  /  DBili  x   /  AST  14  /  ALT  8<L>  /  AlkPhos  127<H>  07-18          Culture - Blood (collected 07-15-22 @ 08:36)  Source: .Blood Blood  Preliminary Report (07-16-22 @ 12:01):    No growth to date.    Culture - Blood (collected 07-15-22 @ 07:15)  Source: .Blood Blood  Preliminary Report (07-16-22 @ 18:01):    No growth to date.    Culture - Urine (collected 07-13-22 @ 00:58)  Source: Clean Catch Clean Catch (Midstream)  Final Report (07-15-22 @ 08:40):    50,000 - 99,000 CFU/mL Klebsiella pneumoniae ESBL  Organism: Klebsiella pneumoniae ESBL (07-15-22 @ 08:40)  Organism: Klebsiella pneumoniae ESBL (07-15-22 @ 08:40)                    REVIEW OF SYSTEMS  ------------------------------------  Gen: +fatigue, No fevers/chills, +weakness  Skin: No rashes  Head/Eyes/Ears/Mouth: No headache; Normal hearing; Normal vision w/o blurriness; No sinus pain/discomfort, sore throat  Respiratory: No dyspnea, cough, wheezing, hemoptysis  CV: No chest pain, PND, orthopnea  GI: No abdominal pain, diarrhea, constipation, nausea, vomiting, melena, hematochezia  : No increased frequency, dysuria, hematuria, nocturia  MSK: No joint pain/swelling; no back pain; no edema  Neuro: No dizziness/lightheadedness,  seizures, numbness, tingling, +weakness  Heme: No easy bruising or bleeding  Endo: No heat/cold intolerance  Psych: No significant nervousness, anxiety, stress, depression  All other systems were reviewed and are negative, except as noted.          PHYSICAL EXAM:  Constitutional: Well developed / well nourished  Eyes: Anicteric, PERRLA  ENMT: nc/at, +oral thrush improving  Neck: supple  Respiratory: CTA   Cardiovascular: RRR  Gastrointestinal: Soft, non distended, NT, RLQ incision healed   Genitourinary: voiding spontaneously  Extremities: SCD's in place and working bilaterally, overall edema improving  Vascular: Palpable dp pulses bilaterally  Neurological: AAOx3  Skin: no rashes, ulcerations or lesions  Musculoskeletal: Moving all extremities, global weakness  Psychiatric: calm, follows commands and interacts appropriately

## 2022-07-18 NOTE — DISCHARGE NOTE PROVIDER - NSDCCPCAREPLAN_GEN_ALL_CORE_FT
PRINCIPAL DISCHARGE DIAGNOSIS  Diagnosis: Uncontrolled seizures  Assessment and Plan of Treatment: you were treated for seizures. follow closely with Dr. Patterson. take all medications as prescribed

## 2022-07-18 NOTE — PROGRESS NOTE ADULT - ASSESSMENT
66M with h/o DM type II, HTN, CAD s/p CABG in 2020, Afib on Eliquis, CVA in 2019 due to Afib, Seizure d/o (last episode was on 4/8/22), h/o GI bleed in 2/2022 EGD with duodenal ulcer and ESRD on HD s/p R DDRT from DCD donor on 4/21/22 complicated by DGF requiring HD until 4/29/22 now with good graft function who presented with status epilepticus in setting of UTI/antibiotics and sub-therapeutic valproic acid level     Seizure Disorder:  - Repeat MRI head 6/27 with less concerns for PRES, likely ischemic changes  - Remains seizure free  - Antiepileptic regimen per Neurology (levels low today, team to adjust)  - Keep Mag > 2    R DCD DDRT (ureter stented) 4/21/22:  - Good graft function, mild rise in Cr. check PVR and urine lytes  - Strict I/Os, Continue Doxazosin/Proscar  - ESBL Klebsiella UTI (post stent removal): completed Ertapenem.   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 Vocal cords good. Reflux precautions (no spicy food and caffeine once has diet). Voice rest. overall improving  - OOB with RN/PT    Immunosuppression:   - Belatacept: 6/23, 7/4 (initial loading). Re-loaded 7/8 as there was a gap between doses 2/2 seizures. next dose today  - Cellcept to 500 BID (1/2 dose given ESBL Kleb UTI, will continue at this dose given leukopenia)  - continue Pred 5  - PPx: Valcyte/PPI/nystatin (thrush)/Bactrim    DM  - on Lantus/Lispro, Endocrine following     AFib:  - continue Retacrit weekly  - Eliquis with ~40% less efficacy while on fosphenytoin.  On Lovenox 60BID. Plan to continue switch to Coumadin once discharged home from rehab. Plan discussed with neurology and family  - rate controlled    R IJ DVT  - On Lovenox 60BID    HTN:  - Nifedipine/Hydralazine/Cardura. adjust prn    DISPO  -overall with good progress. now approved for Acute Rehab, will discuss with CM and family  -to f/u with Dr. Andre Patterson, Epilepsy team upon d/c per family request

## 2022-07-18 NOTE — PROGRESS NOTE ADULT - NS ATTEND AMEND GEN_ALL_CORE FT
making good progress  immunosuppression belatacept, cellcept and prednisone  plan discharge in 24 to 48 hrs to rehab

## 2022-07-18 NOTE — DISCHARGE NOTE PROVIDER - HOSPITAL COURSE
66M with PMH: DM type II, HTN, CAD s/p CABG in 2020, AFib on Eliquis, CVA in 2019 due to Afib, Seizure d/o following CVA, last episode was on 4/8/22, h/o GIB in 2/2022 EGD with duodenal ulcer.  ESRD due to DM was on HD since 2019.     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  Donor was 58 , KDPI 82%, DCD, single vessels and ureter, HLA mismatch 1, 2, 2. No DSA, cPRA 0%. 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     Re-admitted in May for anemia in the setting of large perinephric hematoma s/p evacuation and repair of arterial anastomosis on 5/2/22. Intra operative biopsy showed no rejection, Creatinine ranging ~2mg/dL, sent to Rehab    In Rehab:  He was recently dx with klebsiella UTI rx with ertapenem - then switched to Levaquin  He also had parainfluenza pneumonia. Was at rehab progressing well.      Hospital course:  - 6/10: transferred from rehab facility for seizure status epilepticus. Intubated for respiratory protection and transferred to MICU.  EEG showed no seizure activity. Neuro consulted.   - 6/11: Extubated. Found to have R pleural effusion. s/p Thoracentesis 1.3L. Eliquis changed to heparin drip.  Post procedure drop in H&H. 5u PRBC, 2 u FFP.   - 6/11 evening: Transferred to SICU from MICU for further care in setting of hypoxia, significant R pleural effusion, anemia and hemodynamic instability  - 6/11 Intubated at 9pm and sedated on Precedex.  CT placed, found to have hemothorax, ~1.6L total, temporary pressors  - 6/13 s/p VATS with Thoracic, 2.2L old blood removed; second chest tube placed  - 6/18-19: both chest tubes removed  - 6/21: ?PRES vs. chronic ischemic changes on MRI, taken off Envarsus, switched to Belatacept 6/23 with good graft function  - 6/24: R IJ DVT, switched to Lovenox  - 6/25: Tx to SICU for refractory seizures, improved with IV antiepileptic regimen  - 7/10: Prolonged AMS/debility, was on tube feeds.  Eventually passed bedside S&S in SICU. Mentation returned to baseline.  Downgraded from SICU to floor.   - 7/11: OR-->URETERAL STENT REMOVED  - 7/15: ESBL Klebsiella UTI (50-90K), completed Ertapenem x 3 Days    Medically optimized and deemed safe for d/c to Acute Rehab by our multidisciplinary transplant team with the following plan:  -BELATACEPT (6/23, 7/4--reloaded 7/8, 7/18), next dose----------  -MMF------  -Pred 5  -PPx: Mepron (hyperkalemia on Bactrim), Valcyte, PPI  -Lovenox for AFib/R IJ DVT. Will transition to Coumadin as outpatient per family (son is Cardiologist). (Eliquis has ~40% less efficacy while on Phenytoin)    -f/u in Transplant Clinic------  -f/u with Endocrinology Dr. Lincoln----  -f/u with Epilepsy team Dr. Andre Patterson------  -f/u with Cardiology-------- 66M with PMH: DM type II, HTN, CAD s/p CABG in 2020, AFib on Eliquis, CVA in 2019 due to Afib, Seizure d/o following CVA, last episode was on 4/8/22, h/o GIB in 2/2022 EGD with duodenal ulcer.  ESRD due to DM was on HD since 2019.     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  Donor was 58 , KDPI 82%, DCD, single vessels and ureter, HLA mismatch 1, 2, 2. No DSA, cPRA 0%. 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     Re-admitted in May for anemia in the setting of large perinephric hematoma s/p evacuation and repair of arterial anastomosis on 5/2/22. Intra operative biopsy showed no rejection, Creatinine ranging ~2mg/dL, sent to Rehab    In Rehab:  He was recently dx with klebsiella UTI rx with ertapenem - then switched to Levaquin  He also had parainfluenza pneumonia. Was at rehab progressing well.      Hospital course:  - 6/10: transferred from rehab facility for seizure status epilepticus. Intubated for respiratory protection and transferred to MICU.  EEG showed no seizure activity. Neuro consulted.   - 6/11: Extubated. Found to have R pleural effusion. s/p Thoracentesis 1.3L. Eliquis changed to heparin drip.  Post procedure drop in H&H. 5u PRBC, 2 u FFP.   - 6/11 evening: Transferred to SICU from MICU for further care in setting of hypoxia, significant R pleural effusion, anemia and hemodynamic instability  - 6/11 Intubated at 9pm and sedated on Precedex.  CT placed, found to have hemothorax, ~1.6L total, temporary pressors  - 6/13 s/p VATS with Thoracic, 2.2L old blood removed; second chest tube placed  - 6/18-19: both chest tubes removed  - 6/21: ?PRES vs. chronic ischemic changes on MRI, taken off Envarsus, switched to Belatacept 6/23 with good graft function  - 6/24: R IJ DVT, switched to Lovenox  - 6/25: Tx to SICU for refractory seizures, improved with IV antiepileptic regimen  - 7/10: Prolonged AMS/debility, was on tube feeds.  Eventually passed bedside S&S in SICU. Mentation returned to baseline.  Downgraded from SICU to floor.   - 7/11: OR-->URETERAL STENT REMOVED  - 7/15: ESBL Klebsiella UTI (50-90K), completed Ertapenem x 3 Days  - 7/25: Tx to SICU for Sepsis/ elevated LFTS  - 7/27: Shiley placed for HD  - 7/28: ongoing fevers -> started Ertapenem/Fluc  - 7/29: HD restarted + 1U PRBC.  IR bx with 2A rejection, started pulse steroids. LFTs have improved  - 8/1: started on sirolimus off belatacept  - 8/4: refractory seizures  - 8/8: s/p derm bx with no acute finding  - 8/8: s/p permacath exchange  - 8/16: One seizure on EEG medications changed added brivaracetam  - 8/22: HD, followed by U/S of Transplant kidney  - 8/31 Eliquis changed to coumadin (due to 40% less efficacy while on phenytoin)      Medically optimized and deemed safe for d/c to Acute Rehab by our multidisciplinary transplant team with the following plan:  -BELATACEPT (6/23, 7/4--reloaded 7/8, 7/18), next dose----------  -MMF------  -Pred 5  -PPx: Mepron (hyperkalemia on Bactrim), Valcyte, PPI  -Lovenox for AFib/R IJ DVT. Will transition to Coumadin as outpatient per family (son is Cardiologist). (Eliquis has ~40% less efficacy while on Phenytoin)    -f/u in Transplant Clinic------  -f/u with Endocrinology Dr. Lincoln----  -f/u with Epilepsy team Dr. Andre Patterson------  -f/u with Cardiology-------- 66M with PMH: DM type II, HTN, CAD s/p CABG in 2020, AFib on Eliquis, CVA in 2019 due to Afib, Seizure d/o following CVA, last episode was on 4/8/22, h/o GIB in 2/2022 EGD with duodenal ulcer.  ESRD due to DM was on HD since 2019.     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  Donor was 58 , KDPI 82%, DCD, single vessels and ureter, HLA mismatch 1, 2, 2. No DSA, cPRA 0%. 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     Re-admitted in May for anemia in the setting of large perinephric hematoma s/p evacuation and repair of arterial anastomosis on 5/2/22. Intra operative biopsy showed no rejection, Creatinine ranging ~2mg/dL, sent to Rehab    In Rehab:  He was recently dx with klebsiella UTI rx with ertapenem - then switched to Levaquin  He also had parainfluenza pneumonia. Was at rehab progressing well.      Hospital course:  - 6/10: transferred from rehab facility for seizure status epilepticus. Intubated for respiratory protection and transferred to MICU.  EEG showed no seizure activity. Neuro consulted.   - 6/11: Extubated. Found to have R pleural effusion. s/p Thoracentesis 1.3L. Eliquis changed to heparin drip.  Post procedure drop in H&H. 5u PRBC, 2 u FFP.   - 6/11 evening: Transferred to SICU from MICU for further care in setting of hypoxia, significant R pleural effusion, anemia and hemodynamic instability  - 6/11 Intubated at 9pm and sedated on Precedex.  CT placed, found to have hemothorax, ~1.6L total, temporary pressors  - 6/13 s/p VATS with Thoracic, 2.2L old blood removed; second chest tube placed  - 6/18-19: both chest tubes removed  - 6/21: ?PRES vs. chronic ischemic changes on MRI, taken off Envarsus, switched to Belatacept 6/23 with good graft function  - 6/24: R IJ DVT, switched to Lovenox  - 6/25: Tx to SICU for refractory seizures, improved with IV antiepileptic regimen  - 7/10: Prolonged AMS/debility, was on tube feeds.  Eventually passed bedside S&S in SICU. Mentation returned to baseline.  Downgraded from SICU to floor.   - 7/11: OR-->URETERAL STENT REMOVED  - 7/15: ESBL Klebsiella UTI (50-90K), completed Ertapenem x 3 Days  - 7/25: Tx to SICU for Sepsis/ elevated LFTS  - 7/27: Shiley placed for HD  - 7/28: ongoing fevers -> started Ertapenem/Fluc  - 7/29: HD restarted + 1U PRBC.  IR bx with 2A rejection, started pulse steroids. LFTs have improved  - 8/1: started on sirolimus off belatacept  - 8/4: refractory seizures  - 8/8: s/p derm bx with no acute finding- neg PTLD  - 8/8: s/p permacath exchange  - 8/16: One seizure on EEG medications changed added brivaracetam  - 8/22: HD, followed by U/S of Transplant kidney  - 8/31 Eliquis changed to coumadin (due to 40% less efficacy while on phenytoin)    Discharge plan:  Seizure Disorder:  - Neuro/Epilepsy Teams consulted: recs Avoid Fycompa 2/2 agitation in past, neuro recs to dc phenytoin to be done out pt  Plan:    - On Phenytoin and Briviact 50mg BID with additional 50mg post each HD session    R DCD DDRT 4/21/22 c/b 2A ACR  - 2A ACR: s/p pulse steroids. Continue Prednisone 10mg QD  - continued on HD / Lasix 80mg on off HD day    RLQ Cellulitis now  Improved   - Complete course of Keflex (8/20 - 9/4)       DM Endo consulted   - On Lantus 3U and pre-meal 10/8/8U with ISS coverage  - Fingerstick ac and qhs    AFib/R IJ DVT   - Full ac transitioned to coumadin on 8/31 dose daily per INR goal >2 -2.5  -  Coumadin up to 3.02, Hold tonight to be [facility] in [facility]    HTN:  - Amlodipine/Doxazosin. Coreg 12.5 BID    BPH:  - Continue Doxazosin/Proscar          Medically optimized and deemed safe for d/c to Acute Rehab by our multidisciplinary transplant team with the following plan:  -Immunosuppression:   - Belatacept: 6/23-8/1 discontinued  - Sirolimus 7 mg QD  Cellcept 500 BID. Pred 10mg QD  - PPX: Nystatin, bactrim, valcyte (MWF)    -f/u in Transplant Clinic   -f/u with Endocrinology Dr. Lincoln on d/c   -f/u with Epilepsy team Dr. Andre Patterson on dc  -f/u with Cardiology on dc

## 2022-07-18 NOTE — DISCHARGE NOTE PROVIDER - CARE PROVIDERS DIRECT ADDRESSES
,[email or web address]

## 2022-07-18 NOTE — PROGRESS NOTE ADULT - SUBJECTIVE AND OBJECTIVE BOX
[facility]   MD NINA MASON MD, DO SADIE RAMIREZ NP    TEL:  OFFICE: [phone]    From 5pm-7am Answering Service [phone]    -- RENAL FOLLOW UP NOTE ---Date of Service 07-18-22 @ 14:34    Patient is a 66y old  Male who presents with a chief complaint of Status Epilepticus (18 Jul 2022 13:54)      Patient seen and examined at bedside. No chest pain/sob    VITALS:  T(F): 97.9 (07-18-22 @ 12:54), Max: 98.5 (07-18-22 @ 08:55)  HR: 67 (07-18-22 @ 12:54)  BP: 140/68 (07-18-22 @ 12:54)  RR: 20 (07-18-22 @ 12:54)  SpO2: 97% (07-18-22 @ 12:54)  Wt(kg): --    07-17 @ 07:01  -  07-18 @ 07:00  --------------------------------------------------------  IN: 1710 mL / OUT: 1350 mL / NET: 360 mL    07-18 @ 07:01  -  07-18 @ 14:34  --------------------------------------------------------  IN: 700 mL / OUT: 220 mL / NET: 480 mL          PHYSICAL EXAM:  Constitutional: NAD  Neck: No JVD  Respiratory: CTAB, no wheezes, rales or rhonchi  Cardiovascular: S1, S2, RRR  Gastrointestinal: BS+, soft, NT/ND  Extremities: No peripheral edema    Hospital Medications:   MEDICATIONS  (STANDING):  atorvastatin 40 milliGRAM(s) Oral at bedtime  chlorhexidine 2% Cloths 1 Application(s) Topical <User Schedule>  doxazosin 4 milliGRAM(s) Oral <User Schedule>  enoxaparin Injectable 60 milliGRAM(s) SubCutaneous every 12 hours  epoetin cortney-epbx (RETACRIT) Injectable 18899 Unit(s) SubCutaneous every 7 days  finasteride 5 milliGRAM(s) Oral daily  hydrALAZINE 100 milliGRAM(s) Oral every 8 hours  insulin glargine Injectable (LANTUS) 4 Unit(s) SubCutaneous at bedtime  insulin lispro (ADMELOG) corrective regimen sliding scale   SubCutaneous three times a day with meals  insulin lispro (ADMELOG) corrective regimen sliding scale   SubCutaneous at bedtime  insulin lispro Injectable (ADMELOG) 3 Unit(s) SubCutaneous three times a day before meals  levothyroxine 50 MICROGram(s) Oral daily  melatonin 6 milliGRAM(s) Oral at bedtime  mycophenolate mofetil Suspension 500 milliGRAM(s) Oral <User Schedule>  NIFEdipine XL 60 milliGRAM(s) Oral two times a day  nystatin    Suspension 781617 Unit(s) Oral four times a day  pantoprazole    Tablet 40 milliGRAM(s) Oral daily  phenytoin   Capsule 100 milliGRAM(s) Oral <User Schedule>  phenytoin   Capsule 60 milliGRAM(s) Oral <User Schedule>  polyethylene glycol 3350 17 Gram(s) Oral daily  predniSONE  Solution 5 milliGRAM(s) Oral daily  trimethoprim   80 mG/sulfamethoxazole 400 mG 1 Tablet(s) Oral daily  valGANciclovir 450 milliGRAM(s) Oral daily      LABS:  07-18    129<L>  |  98  |  30<H>  ----------------------------<  145<H>  4.5   |  23  |  1.34<H>    Ca    8.2<L>      18 Jul 2022 06:09  Phos  2.2     07-18  Mg     2.0     07-18    TPro  5.5<L>  /  Alb  2.1<L>  /  TBili  0.2  /  DBili      /  AST  14  /  ALT  8<L>  /  AlkPhos  127<H>  07-18    Creatinine Trend: 1.34 <--, 1.26 <--, 1.21 <--, 1.18 <--, 1.08 <--, 1.10 <--, 1.16 <--    Albumin, Serum: 2.1 g/dL (07-18 @ 06:09)  Phosphorus Level, Serum: 2.2 mg/dL (07-18 @ 06:09)                              8.5    2.57  )-----------( 265      ( 18 Jul 2022 06:11 )             27.4     Urine Studies:  Urinalysis - [07-18-22 @ 10:17]      Color Light Yellow / Appearance Clear / SG 1.018 / pH 6.0      Gluc Negative / Ketone Negative  / Bili Negative / Urobili Negative       Blood Negative / Protein 30 mg/dL / Leuk Est Negative / Nitrite Negative      RBC 3 / WBC 2 / Hyaline 0 / Gran  / Sq Epi  / Non Sq Epi 1 / Bacteria Negative    Urine Creatinine 72      [07-18-22 @ 10:18]  Urine Protein 58      [07-18-22 @ 10:18]  Urine Sodium 40      [07-18-22 @ 10:18]  Urine Potassium 22      [07-18-22 @ 10:18]  Urine Osmolality 370      [07-18-22 @ 10:18]    Iron 17, TIBC 171, %sat 10      [05-02-22 @ 13:03]  Ferritin 1505      [05-02-22 @ 13:05]  PTH -- (Ca 9.2)      [02-05-22 @ 10:13]   294  HbA1c 6.0      [02-21-20 @ 08:53]  TSH 4.69      [07-06-22 @ 18:36]      Syphilis Screen (Treponema Pallidum Ab) Negative      [06-27-22 @ 02:00]    RADIOLOGY & ADDITIONAL STUDIES:

## 2022-07-18 NOTE — DISCHARGE NOTE PROVIDER - DETAILS OF MALNUTRITION DIAGNOSIS/DIAGNOSES
This patient has been assessed with a concern for Malnutrition and was treated during this hospitalization for the following Nutrition diagnosis/diagnoses:     -  06/11/2022: Severe protein-calorie malnutrition

## 2022-07-18 NOTE — CHART NOTE - NSCHARTNOTEFT_GEN_A_CORE
Phenytoin level 3.4, corrected 5.0    Increase phenytoin to 100mg q8h.  Check level in 3 days.    Upon discharge, outpatient follow up with Dr. Patterson.     Case discussed with neurology attending, Dr. Allred.

## 2022-07-18 NOTE — PROGRESS NOTE ADULT - ASSESSMENT
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   PMH: DM type II, HTN, CAD s/p CABG in 2020, Afib on Eliquis, CVA in 2019 due to Afib, Seizure d/o following CVA, h/o GI bleed in 2/2022 EGD with duodenal ulcer.  Donor was 58 , KDPI 82%, DCD, single vessels and ureter, HLA mismatch 1, 2, 2. No DSA, cPRA 0%. CMV +/+    Hospitalized May 2022 for anemia in the setting of large perinephric hematoma s/p evacuation and repair of arterial anastomosis on 5/2/22. Intra operative biopsy showed no rejection.  H/o seizure d/o with last seizure episode prior to transplant was on 4/8/22. He is readmitted for status epilepticus in the setting of sub therapeutic valproic acid levels. Course complicated by respiratory failure, hemothorax and hemorrhagic shock following right thoracentesis done on 6/10 for effusion. S/p PRBC x 5 units and vasopressors, chest tube, followed by VATS.    Envarsus discontinued and started on belatacept due to concern for neurotoxicity.   Shagufta function is stable, mental status is slowly improving. He is on fosphenytoin with good control of seizures.       1. S/p DCD DDRT on 4/21/22  - creatinine stable at 1.2mg/dL. Electrolytes and volume status fair. Transplant ureteric stent removed 7/12.     2. Immunosuppression - Envarsus discontinued for possible neurotoxicity. Continue Belatacept, next dose on 7/18      Continue Cellcept to 500mg po bid - dose reduced on 7/16 for cytopenia and UTI      Continue Prednisone 5mg po daily      Infection prophylaxis - continue bactrim ss daily , continue Valcyte 450mg daily (CMV intermediate risk)     3. Seizure d/o -admitted with status epilepticus . Now controlled on fosphenytoin. Drug level low. Neurology follow up.      4. HTN:  BP sub optimal on current regimen Hydralazine 100mg q8, Nifedipine 60mg po bid, Doxazosin 4mg.        Continue Nifedipine and Hydralazine at current doses, increase doxazosin to 4mg po bid      5. BPH with urine retention - continue Proscar and doxazosin    6. Cytopenia -  Continue epo 10,000 weekly for anemia, continue MMF to 500mg po bid.     7. Atrial fibrillation - was on Eliquis, now on therapeutic Lovenox due to drug interaction with fosphenytoin. Family comfortable with continuing with Lovenox for now rather than coumadin.     8. ESBL Klebsiella UTI - Continue ertapenem x 3 days total per transplant ID    D/c planning to sub acute rehab.  66 yr old man with h/o ESRD due to DM on HD since 2019, s/p DDRT from DCD donor on 4/21/22 complicated by DGF requiring HD until 4/29/22 now admitted with seizures/status epilepticus    1. S/p DCD DDRT on 4/21/22  - creatinine slowly uptrending to 1.34 today. Electrolytes and volume status fair. Transplant ureteric stent removed 7/12. Monitor labs and urine output.     2. Immunosuppression - Envarsus discontinued for possible neurotoxicity. Continue Belatacept, next dose on 7/18 (today)     Continue Cellcept to 500mg po bid - dose reduced on 7/16 for cytopenia and UTI      Continue Prednisone 5mg po daily      Infection prophylaxis - continue bactrim ss daily , continue Valcyte 450mg daily (CMV intermediate risk)     3. Seizure d/o -admitted with status epilepticus . Now controlled on fosphenytoin. Dose being adjusted as per levels. Neurology follow up.      4. HTN:  BP sub optimal on current regimen Hydralazine 100mg q8, Nifedipine 60mg po bid, Doxazosin 4mg.        Continue Nifedipine and Hydralazine at current doses, increase doxazosin to 4mg po bid      5. BPH with urine retention - continue Proscar and doxazosin    6. Cytopenia -  Continue epo 10,000 weekly for anemia, continue MMF to 500mg po bid.     7. Atrial fibrillation - was on Eliquis, now on therapeutic Lovenox due to drug interaction with fosphenytoin. Family comfortable with continuing with Lovenox for now rather than coumadin.     8. ESBL Klebsiella UTI - Continue ertapenem x 3 days total per transplant ID    9. Hyponatremia: SNa slowly downtrending to 129 today. Check urine osmolality and urine lytes. Check PVR to rule out retention. Monitor SNa. Avoid hypotonic fluids.

## 2022-07-18 NOTE — DISCHARGE NOTE PROVIDER - NSDCMRMEDTOKEN_GEN_ALL_CORE_FT
amLODIPine 10 mg oral tablet: 1 tab(s) enteral once a day  apixaban 2.5 mg oral tablet: 1 tab(s) orally 2 times a day  atorvastatin 40 mg oral tablet: 1 tab(s) orally once a day (at bedtime)  CellCept 500 mg oral tablet: 1 tab(s) orally 2 times a day  Coreg 6.25 mg oral tablet: 1 tab(s) orally 2 times a day  divalproex sodium 250 mg oral delayed release tablet: 1 tab(s) orally once a day  divalproex sodium 500 mg oral delayed release tablet: 1 tab(s) orally 2 times a day  Envarsus XR 1 mg oral tablet, extended release: 3 tab(s) by gastrostomy tube once a day (in the morning)  fluconazole 200 mg oral tablet: 1 tab(s) orally once a day  HumaLOG 100 units/mL injectable solution: 5 unit(s) injectable 3 times a day  insulin glargine 100 units/mL subcutaneous solution: 6 unit(s) subcutaneous once a day (at bedtime)  levETIRAcetam 250 mg oral tablet: 1 tab(s) orally 2 times a day  levothyroxine 50 mcg (0.05 mg) oral tablet: 1 tab(s) orally once a day  magnesium oxide 400 mg oral tablet: 1 tab(s) orally 3 times a day (with meals)  Multiple Vitamins oral capsule: 1 cap(s) orally once a day  pantoprazole 40 mg oral delayed release tablet: 1 tab(s) orally once a day (before a meal)  predniSONE: 5 milligram(s) orally once a day  senna oral tablet: 2 tab(s) orally once a day (at bedtime)  sulfamethoxazole-trimethoprim 400 mg-80 mg oral tablet: 1 tab(s) orally once a day  valGANciclovir 450 mg oral tablet: 1 tab(s) orally 3 times a week   amLODIPine 10 mg oral tablet: 1 tab(s) orally once a day  brivaracetam 50 mg oral tablet: 1 tab(s) orally 2 times a day  give additional 50 mg post HD  carvedilol 12.5 mg oral tablet: 1 tab(s) orally every 12 hours  doxazosin 4 mg oral tablet: 1 tab(s) orally   epoetin cortney: 28588 unit(s) intravenous 3 times a week  with HD  finasteride 5 mg oral tablet: 1 tab(s) orally once a day  furosemide 80 mg oral tablet: 1 tab(s) orally once a day  insulin glargine 100 units/mL subcutaneous solution: 3 unit(s) subcutaneous once a day (at bedtime)  insulin lispro 100 units/mL injectable solution: 10 unit(s) injectable 3 times a day (before meals)    10 units before breakfast  8 units before lunch  8 units before dinner  levothyroxine 50 mcg (0.05 mg) oral tablet: 1 tab(s) orally once a day  Multiple Vitamins oral tablet: 1 tab(s) orally once a day  mycophenolate mofetil 250 mg oral capsule: 2 cap(s) orally 2 times a day  nystatin 100,000 units/mL oral suspension: 5 milliliter(s) orally 4 times a day  ocular lubricant ophthalmic solution: 2 drop(s) to each affected eye every 6 hours  pantoprazole 40 mg oral delayed release tablet: 1 tab(s) orally once a day  polyethylene glycol 3350 oral powder for reconstitution: 17 gram(s) orally once a day  predniSONE 10 mg oral tablet: 1 tab(s) orally once a day  senna leaf extract oral tablet: 2 tab(s) orally once a day (at bedtime)  sirolimus 1 mg oral tablet: 7 tab(s) orally once a day  sulfamethoxazole-trimethoprim 400 mg-80 mg oral tablet: 1 tab(s) orally once a day  valGANciclovir 450 mg oral tablet: 1 tab(s) orally 3 times a week  M/W/F   amLODIPine 10 mg oral tablet: 1 tab(s) orally once a day  brivaracetam 50 mg oral tablet: 1 tab(s) orally 2 times a day  give additional 50 mg post HD  carvedilol 12.5 mg oral tablet: 1 tab(s) orally every 12 hours  doxazosin 4 mg oral tablet: 1 tab(s) orally 2 times a day  epoetin cortney: 62822 unit(s) intravenous 3 times a week  with HD  finasteride 5 mg oral tablet: 1 tab(s) orally once a day  furosemide 80 mg oral tablet: 1 tab(s) orally once a day  insulin glargine 100 units/mL subcutaneous solution: 3 unit(s) subcutaneous once a day (at bedtime)  insulin lispro 100 units/mL injectable solution: 10 unit(s) injectable 3 times a day (before meals)    10 units before breakfast  8 units before lunch  8 units before dinner  levothyroxine 50 mcg (0.05 mg) oral tablet: 1 tab(s) orally once a day  Multiple Vitamins oral tablet: 1 tab(s) orally once a day  mycophenolate mofetil 250 mg oral capsule: 2 cap(s) orally 2 times a day  nystatin 100,000 units/mL oral suspension: 5 milliliter(s) orally 4 times a day  ocular lubricant ophthalmic solution: 2 drop(s) to each affected eye every 6 hours  pantoprazole 40 mg oral delayed release tablet: 1 tab(s) orally once a day  phenytoin: 200 milligram(s) orally 2 times a day   polyethylene glycol 3350 oral powder for reconstitution: 17 gram(s) orally once a day  predniSONE 10 mg oral tablet: 1 tab(s) orally once a day  senna leaf extract oral tablet: 2 tab(s) orally once a day (at bedtime)  sirolimus 1 mg oral tablet: 7 tab(s) orally once a day  sulfamethoxazole-trimethoprim 400 mg-80 mg oral tablet: 1 tab(s) orally once a day  valGANciclovir 450 mg oral tablet: 1 tab(s) orally 3 times a week  M/W/F  warfarin 4 mg oral tablet: 1 tab(s) orally once a day     TITRATE BASED ON INR

## 2022-07-18 NOTE — DISCHARGE NOTE PROVIDER - NSDCFUSCHEDAPPT_GEN_ALL_CORE_FT
Iker Alfred  [facility]  [address]  Scheduled Appointment: 07/21/2022    Iker Alfred  [facility]  [address]  Scheduled Appointment: 08/04/2022    Iker Alfred  [facility]  [address]  Scheduled Appointment: 08/18/2022    Iker Alfred  [facility]  [address]  Scheduled Appointment: 09/01/2022    Mary Lomeli  [facility]  [address]  Scheduled Appointment: 09/06/2022    Iker Alfred  [facility]  [address]  Scheduled Appointment: 09/20/2022     Iker Alfred  [facility]  NE[address] [place] [address]  Scheduled Appointment: 09/20/2022    Iker Alfred  [facility]  [address]  Scheduled Appointment: 10/20/2022

## 2022-07-19 NOTE — PROGRESS NOTE ADULT - SUBJECTIVE AND OBJECTIVE BOX
DIABETES FOLLOW UP : Seen earlier today    INTERVAL HX: pt tolerating po per pt and per son at bedside pt toleraing approx 50% of meal + 100% nepro and 100% of mighty shake      Review of Systems:  General: As above  Cardiovascular: No chest pain  Respiratory: No SOB  GI: No nausea, vomiting  Endocrine: no  S&Sx of hypoglycemia    Allergies    No Known Allergies    Intolerances      MEDICATIONS  (STANDING):  atorvastatin 40 milliGRAM(s) Oral at bedtime  chlorhexidine 2% Cloths 1 Application(s) Topical <User Schedule>  doxazosin 4 milliGRAM(s) Oral <User Schedule>  enoxaparin Injectable 60 milliGRAM(s) SubCutaneous every 12 hours  epoetin cortney-epbx (RETACRIT) Injectable 90654 Unit(s) SubCutaneous every 7 days  finasteride 5 milliGRAM(s) Oral daily  hydrALAZINE 100 milliGRAM(s) Oral every 8 hours  insulin glargine Injectable (LANTUS) 4 Unit(s) SubCutaneous at bedtime  insulin lispro (ADMELOG) corrective regimen sliding scale   SubCutaneous three times a day with meals  insulin lispro (ADMELOG) corrective regimen sliding scale   SubCutaneous at bedtime  insulin lispro Injectable (ADMELOG) 3 Unit(s) SubCutaneous three times a day before meals  levothyroxine 50 MICROGram(s) Oral daily  melatonin 6 milliGRAM(s) Oral at bedtime  mycophenolate mofetil Suspension 500 milliGRAM(s) Oral <User Schedule>  NIFEdipine XL 60 milliGRAM(s) Oral two times a day  nystatin    Suspension 087194 Unit(s) Oral four times a day  pantoprazole    Tablet 40 milliGRAM(s) Oral daily  phenytoin   Capsule 100 milliGRAM(s) Oral every 8 hours  polyethylene glycol 3350 17 Gram(s) Oral daily  predniSONE  Solution 5 milliGRAM(s) Oral daily  trimethoprim   80 mG/sulfamethoxazole 400 mG 1 Tablet(s) Oral daily  valGANciclovir 450 milliGRAM(s) Oral daily        atorvastatin   40 milliGRAM(s) Oral (07-18-22 @ 22:05)    finasteride   5 milliGRAM(s) Oral (07-19-22 @ 12:12)    insulin glargine Injectable (LANTUS)   5 Unit(s) SubCutaneous (07-18-22 @ 22:04)    insulin lispro Injectable (ADMELOG)   3 Unit(s) SubCutaneous (07-19-22 @ 09:22)   3 Unit(s) SubCutaneous (07-18-22 @ 17:49)    levothyroxine   50 MICROGram(s) Oral (07-19-22 @ 05:34)    predniSONE  Solution   5 milliGRAM(s) Oral (07-19-22 @ 06:32)        PHYSICAL EXAM:  VITALS: T(C): 36.6 (07-19-22 @ 13:27)  T(F): 97.9 (07-19-22 @ 13:27), Max: 99.4 (07-19-22 @ 01:00)  HR: 70 (07-19-22 @ 13:27) (70 - 83)  BP: 158/86 (07-19-22 @ 13:27) (151/67 - 168/79)  RR:  (18 - 20)  SpO2:  (96% - 100%)  Wt(kg): --  GENERAL: male laying in bed in NAD  Respiratory: Respirations unlabored   Extremities: Warm, no edema  NEURO: Alert , appropriate       LABS:    POCT Blood Glucose.: 135 mg/dL (07-19-22 @ 12:43)  POCT Blood Glucose.: 112 mg/dL (07-19-22 @ 09:07)  POCT Blood Glucose.: 113 mg/dL (07-18-22 @ 21:06)  POCT Blood Glucose.: 114 mg/dL (07-18-22 @ 17:17)  POCT Blood Glucose.: 165 mg/dL (07-18-22 @ 12:25)  POCT Blood Glucose.: 182 mg/dL (07-18-22 @ 08:20)  POCT Blood Glucose.: 209 mg/dL (07-17-22 @ 21:22)  POCT Blood Glucose.: 285 mg/dL (07-17-22 @ 17:08)  POCT Blood Glucose.: 211 mg/dL (07-17-22 @ 12:41)  POCT Blood Glucose.: 182 mg/dL (07-17-22 @ 08:19)  POCT Blood Glucose.: 226 mg/dL (07-16-22 @ 21:22)  POCT Blood Glucose.: 304 mg/dL (07-16-22 @ 17:39)                            8.3    2.83  )-----------( 295      ( 19 Jul 2022 06:05 )             26.4       07-19    135  |  100  |  41<H>  ----------------------------<  98  4.6   |  23  |  1.41<H>    Ca    8.6      19 Jul 2022 06:03  Phos  2.6     07-19  Mg     2.0     07-19    TPro  5.8<L>  /  Alb  2.3<L>  /  TBili  0.2  /  DBili  x   /  AST  17  /  ALT  10  /  AlkPhos  124<H>  07-19      eGFR: 55 mL/min/1.73m2 (19 Jul 2022 06:03)          Thyroid Function Tests:  07-06 @ 18:36 TSH 4.69 FreeT4 -- T3 43 Anti TPO -- Anti Thyroglobulin Ab -- TSI --          A1C with Estimated Average Glucose Result: 6.0 % (06-30-22 @ 05:49)  A1C with Estimated Average Glucose Result: 6.6 % (04-24-22 @ 17:12)      Estimated Average Glucose: 126 mg/dL (06-30-22 @ 05:49)  Estimated Average Glucose: 143 mg/dL (04-24-22 @ 17:12)

## 2022-07-19 NOTE — PROGRESS NOTE ADULT - SUBJECTIVE AND OBJECTIVE BOX
Transplant Surgery Multidisciplinary Progress Note   --------------------------------------------------------------  R DCD DDRT    4/21/22    Patient seen and examined with multidisciplinary team including Transplant Surgeon: Dr. Davis, Nephrologist: Dr. Waqar Lomeli, nephrology fellow. NPOmar, and unit RN during am rounds.  Disciplines not in attendance will be notified of the plan.     HPI: 66M with PMH: DM type II, HTN, CAD s/p CABG in 2020, AFib on Eliquis, CVA in 2019 due to Afib, Seizure d/o following CVA, last episode was on 4/8/22, h/o GIB in 2/2022 EGD with duodenal ulcer.  ESRD due to DM was on HD since 2019.     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  Donor was 58 , KDPI 82%, DCD, single vessels and ureter, HLA mismatch 1, 2, 2. No DSA, cPRA 0%. 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 Re-admitted in May for anemia in the setting of large perinephric hematoma s/p evacuation and repair of arterial anastomosis on 5/2/22. Intra operative biopsy showed no rejection, Creatinine ranging ~2mg/dL.    In Rehab:  He was recently dx with klebsiella UTI rx with ertapenem - then switched to Levaquin day #6.    He also had parainfluenza pneumonia. Was at rehab progressing well.      Hospital course:  - 6/10: transferred from rehab facility for seizure status epilepticus. Intubated for respiratory protection and transferred to MICU.  EEG showed no seizure activity. Neuro consulted.   - 6/11: Extubated. Found to have R pleural effusion. s/p Thoracentesis 1.3L. Eliquis changed to heparin drip.  Post procedure drop in H&H. 5u PRBC, 2 u FFP.   - 6/11 evening: Transferred to SICU from MICU for further care in setting of hypoxia, significant R pleural effusion, anemia and hemodynamic instability  - 6/11 Intubated at 9pm and sedated on Precedex.  CT placed, 600ml blood drained.  1L total overnight, started pressors  - 6/11 Received Protamine for PTT >100 (was on Heparin drip started for AF), 2 u FFP and 5u PRBC total  - 6/13 s/p VATS 2.2L old blood removed; second chest tube placed  - 6/18-19: chest tubes removed  - 6/21: ?PRES vs. chronic ischemic changes on MRI, off Envarsus, switched to Belatacept 6/23 with good graft function  - 6/25: Tx to SICU for refractory seizures, improved with IV antiepileptic regimen  - 7/10: Passed bedside S&S in SICU. Downgraded from SICU to floor.   - 7/11: OR-->URETERAL STENT REMOVED  - 7/15: ESBL Kleb UTI (50-90K), completed Ertapenem x 3 Days    Interval Events:  - Afebrile, completed Ertapenem  - Making great progress daily. Tolerating PO, working with RN/PT  - AAOx3, conversant, in good spirits  - Having bowel function  - graft with slight rise in Cr, 1.4 from 1.3L UO 1L without diuretics  - Phynetoin increased per neuro to 100mg TID, level low 3.7      Immunosuppression:   Induction:  Thymoglobulin                                        Maintenance:  - Belatacept: 6/23, 7/4, 7/18 (nitial loading).  Re-loaded 7/8 as there was a gap between doses 2/2 seizures.    -  BID (leukopenia)  - Continue Pred 5  - Ongoing monitoring for signs of rejection.    Potential Discharge date: early this week    Education:  Medications    Plan of care:  See Below       MEDICATIONS  (STANDING):  atorvastatin 40 milliGRAM(s) Oral at bedtime  chlorhexidine 2% Cloths 1 Application(s) Topical <User Schedule>  doxazosin 4 milliGRAM(s) Oral <User Schedule>  enoxaparin Injectable 60 milliGRAM(s) SubCutaneous every 12 hours  epoetin cortney-epbx (RETACRIT) Injectable 81595 Unit(s) SubCutaneous every 7 days  finasteride 5 milliGRAM(s) Oral daily  hydrALAZINE 100 milliGRAM(s) Oral every 8 hours  insulin glargine Injectable (LANTUS) 5 Unit(s) SubCutaneous at bedtime  insulin lispro (ADMELOG) corrective regimen sliding scale   SubCutaneous three times a day with meals  insulin lispro (ADMELOG) corrective regimen sliding scale   SubCutaneous at bedtime  insulin lispro Injectable (ADMELOG) 3 Unit(s) SubCutaneous three times a day before meals  levothyroxine 50 MICROGram(s) Oral daily  melatonin 6 milliGRAM(s) Oral at bedtime  mycophenolate mofetil Suspension 500 milliGRAM(s) Oral <User Schedule>  NIFEdipine XL 60 milliGRAM(s) Oral two times a day  nystatin    Suspension 976873 Unit(s) Oral four times a day  pantoprazole    Tablet 40 milliGRAM(s) Oral daily  phenytoin   Capsule 100 milliGRAM(s) Oral every 8 hours  polyethylene glycol 3350 17 Gram(s) Oral daily  predniSONE  Solution 5 milliGRAM(s) Oral daily  trimethoprim   80 mG/sulfamethoxazole 400 mG 1 Tablet(s) Oral daily  valGANciclovir 450 milliGRAM(s) Oral daily    MEDICATIONS  (PRN):  acetaminophen     Tablet .. 650 milliGRAM(s) Oral every 6 hours PRN Mild Pain (1 - 3)      PAST MEDICAL & SURGICAL HISTORY:  Hypertension      Diabetes      Dyslipidemia      CAD (Coronary Artery Disease)  with Stents in 06/2009 and 6/2019, s/p off-pump C3L on 8/13/20      Hypothyroidism      CVA (cerebral vascular accident)  12/13/19 with residual bilateral weakness      Anemia      PEG (percutaneous endoscopic gastrostomy) status  removed July 2020      Intubation of airway performed without difficulty  Dec 2019  CVA c/b status epilepticus requiring intubation and PEG in 12/2019-      ESRD on dialysis  M/W/F      Seizures  after CVA, last seizure was last week 4/07/22      History of insertion of stent into coronary artery bypass graft  2009 and 2019      S/P CABG x 3  off pump C3L on 8/13/20          Vital Signs Last 24 Hrs  T(C): 37.1 (19 Jul 2022 09:28), Max: 37.4 (19 Jul 2022 01:00)  T(F): 98.7 (19 Jul 2022 09:28), Max: 99.4 (19 Jul 2022 01:00)  HR: 73 (19 Jul 2022 09:28) (67 - 83)  BP: 151/67 (19 Jul 2022 09:28) (140/68 - 168/79)  BP(mean): 114 (18 Jul 2022 21:10) (114 - 114)  RR: 20 (19 Jul 2022 09:28) (18 - 20)  SpO2: 96% (19 Jul 2022 09:28) (96% - 100%)    Parameters below as of 19 Jul 2022 09:28  Patient On (Oxygen Delivery Method): room air        I&O's Summary    18 Jul 2022 07:01  -  19 Jul 2022 07:00  --------------------------------------------------------  IN: 1720 mL / OUT: 970 mL / NET: 750 mL    19 Jul 2022 07:01  -  19 Jul 2022 10:08  --------------------------------------------------------  IN: 460 mL / OUT: 300 mL / NET: 160 mL                              8.3    2.83  )-----------( 295      ( 19 Jul 2022 06:05 )             26.4     07-19    135  |  100  |  41<H>  ----------------------------<  98  4.6   |  23  |  1.41<H>    Ca    8.6      19 Jul 2022 06:03  Phos  2.6     07-19  Mg     2.0     07-19    TPro  5.8<L>  /  Alb  2.3<L>  /  TBili  0.2  /  DBili  x   /  AST  17  /  ALT  10  /  AlkPhos  124<H>  07-19          Culture - Blood (collected 07-15-22 @ 08:36)  Source: .Blood Blood  Preliminary Report (07-16-22 @ 12:01):    No growth to date.    Culture - Blood (collected 07-15-22 @ 07:15)  Source: .Blood Blood  Preliminary Report (07-16-22 @ 18:01):    No growth to date.    Culture - Urine (collected 07-13-22 @ 00:58)  Source: Clean Catch Clean Catch (Midstream)  Final Report (07-15-22 @ 08:40):    50,000 - 99,000 CFU/mL Klebsiella pneumoniae ESBL  Organism: Klebsiella pneumoniae ESBL (07-15-22 @ 08:40)  Organism: Klebsiella pneumoniae ESBL (07-15-22 @ 08:40)                REVIEW OF SYSTEMS  ------------------------------------  Gen: +fatigue, No fevers/chills, +weakness  Skin: No rashes  Head/Eyes/Ears/Mouth: No headache; Normal hearing; Normal vision w/o blurriness; No sinus pain/discomfort, sore throat  Respiratory: No dyspnea, cough, wheezing, hemoptysis  CV: No chest pain, PND, orthopnea  GI: No abdominal pain, diarrhea, constipation, nausea, vomiting, melena, hematochezia  : No increased frequency, dysuria, hematuria, nocturia  MSK: No joint pain/swelling; no back pain; no edema  Neuro: No dizziness/lightheadedness,  seizures, numbness, tingling, +weakness  Heme: No easy bruising or bleeding  Endo: No heat/cold intolerance  Psych: No significant nervousness, anxiety, stress, depression  All other systems were reviewed and are negative, except as noted.          PHYSICAL EXAM:  Constitutional: Well developed / well nourished  Eyes: Anicteric, PERRLA  ENMT: nc/at, +oral thrush improving  Neck: supple  Respiratory: CTA   Cardiovascular: RRR  Gastrointestinal: Soft, non distended, NT, RLQ incision healed   Genitourinary: voiding spontaneously  Extremities: SCD's in place and working bilaterally, overall edema improving  Vascular: Palpable dp pulses bilaterally  Neurological: AAOx3  Skin: no rashes, ulcerations or lesions  Musculoskeletal: Moving all extremities, global weakness  Psychiatric: calm, follows commands and interacts appropriately

## 2022-07-19 NOTE — PROGRESS NOTE ADULT - ATTENDING COMMENTS
Pt feeling better slowly.  Creatinine is slowly uptrending.  Sodium improved.  Slight hip edema on exam.   Check DSA and renal sonogram.    Cont belatacept, MMF 500mgs BID and prednisone.  Last dose belatacept on 7/18

## 2022-07-19 NOTE — PROGRESS NOTE ADULT - SUBJECTIVE AND OBJECTIVE BOX
[facility] DIVISION OF KIDNEY DISEASES AND HYPERTENSION   FOLLOW UP NOTE    --------------------------------------------------------------------------------  HPI: 66 yr old man with h/o ESRD due to DM on HD since 2019, s/p DDRT from DCD donor on 4/21/22 complicated by DGF requiring HD until 4/29/22.   PMH: DM type II, HTN, CAD s/p CABG in 2020, Afib on Eliquis, CVA in 2019 due to Afib, Seizure d/o following CVA, h/o GI bleed in 2/2022 EGD with duodenal ulcer.  Donor was 58 , KDPI 82%, DCD, single vessels and ureter, HLA mismatch 1, 2, 2. No DSA, cPRA 0%. CMV +/+    Hospitalized May 2022 for anemia in the setting of large perinephric hematoma s/p evacuation and repair of arterial anastomosis on 5/2/22. Intra operative biopsy showed no rejection.  H/o seizure d/o with last seizure episode prior to transplant was on 4/8/22. He is readmitted for status epilepticus in the setting of sub therapeutic valproic acid levels. Course complicated by respiratory failure, hemothorax and hemorrhagic shock following right thoracentesis done on 6/10 for effusion. S/p PRBC x 5 units and vasopressors, chest tube, followed by VATS.    Envarsus discontinued and started on belatacept due to concern for neurotoxicity. Allograft function is stable, mental status is slowly improving. He is on fosphenytoin with good control of seizures.     Pt. was seen and examined today, reports feeling okay. Able to tolerate PO intake. Scr slowly uptrending to 1.4 today. Sna improved to 135.     PAST HISTORY  --------------------------------------------------------------------------------  No significant changes to PMH, PSH, FHx, SHx, unless otherwise noted    ALLERGIES & MEDICATIONS  --------------------------------------------------------------------------------  Allergies  No Known Allergies    Intolerances    Standing Inpatient Medications  atorvastatin 40 milliGRAM(s) Oral at bedtime  chlorhexidine 2% Cloths 1 Application(s) Topical <User Schedule>  doxazosin 4 milliGRAM(s) Oral <User Schedule>  enoxaparin Injectable 60 milliGRAM(s) SubCutaneous every 12 hours  epoetin cortney-epbx (RETACRIT) Injectable 87609 Unit(s) SubCutaneous every 7 days  finasteride 5 milliGRAM(s) Oral daily  hydrALAZINE 100 milliGRAM(s) Oral every 8 hours  insulin glargine Injectable (LANTUS) 5 Unit(s) SubCutaneous at bedtime  insulin lispro (ADMELOG) corrective regimen sliding scale   SubCutaneous three times a day with meals  insulin lispro (ADMELOG) corrective regimen sliding scale   SubCutaneous at bedtime  insulin lispro Injectable (ADMELOG) 3 Unit(s) SubCutaneous three times a day before meals  levothyroxine 50 MICROGram(s) Oral daily  melatonin 6 milliGRAM(s) Oral at bedtime  mycophenolate mofetil Suspension 500 milliGRAM(s) Oral <User Schedule>  NIFEdipine XL 60 milliGRAM(s) Oral two times a day  nystatin    Suspension 499970 Unit(s) Oral four times a day  pantoprazole    Tablet 40 milliGRAM(s) Oral daily  phenytoin   Capsule 100 milliGRAM(s) Oral every 8 hours  polyethylene glycol 3350 17 Gram(s) Oral daily  predniSONE  Solution 5 milliGRAM(s) Oral daily  trimethoprim   80 mG/sulfamethoxazole 400 mG 1 Tablet(s) Oral daily  valGANciclovir 450 milliGRAM(s) Oral daily    PRN Inpatient Medications  acetaminophen     Tablet .. 650 milliGRAM(s) Oral every 6 hours PRN    VITALS/PHYSICAL EXAM  --------------------------------------------------------------------------------  T(C): 37.1 (07-19-22 @ 09:28), Max: 37.4 (07-19-22 @ 01:00)  HR: 73 (07-19-22 @ 09:28) (67 - 83)  BP: 151/67 (07-19-22 @ 09:28) (140/68 - 168/79)  RR: 20 (07-19-22 @ 09:28) (18 - 20)  SpO2: 96% (07-19-22 @ 09:28) (96% - 100%)  Wt(kg): --      07-18-22 @ 07:01  -  07-19-22 @ 07:00  --------------------------------------------------------  IN: 1720 mL / OUT: 970 mL / NET: 750 mL    07-19-22 @ 07:01  -  07-19-22 @ 11:16  --------------------------------------------------------  IN: 460 mL / OUT: 500 mL / NET: -40 mL    Physical Exam:  	Gen: NAD  	HEENT: Anicteric  	Pulm: CTA B/L  	CV: S1S2+  	Abd: Soft, +BS               Transplant site: RLQ non tender, well healed surgical scar+  	Ext: No LE edema B/L  	Neuro: Awake  	Skin: Warm and dry    LABS/STUDIES  --------------------------------------------------------------------------------              8.3    2.83  >-----------<  295      [07-19-22 @ 06:05]              26.4     135  |  100  |  41  ----------------------------<  98      [07-19-22 @ 06:03]  4.6   |  23  |  1.41        Ca     8.6     [07-19-22 @ 06:03]      Mg     2.0     [07-19-22 @ 06:03]      Phos  2.6     [07-19-22 @ 06:03]    Creatinine Trend:  SCr 1.41 [07-19 @ 06:03]  SCr 1.34 [07-18 @ 06:09]  SCr 1.26 [07-17 @ 06:25]  SCr 1.21 [07-16 @ 06:19]  SCr 1.18 [07-15 @ 07:42]    Urinalysis - [07-18-22 @ 10:17]      Color Light Yellow / Appearance Clear / SG 1.018 / pH 6.0      Gluc Negative / Ketone Negative  / Bili Negative / Urobili Negative       Blood Negative / Protein 30 mg/dL / Leuk Est Negative / Nitrite Negative      RBC 3 / WBC 2 / Hyaline 0 / Gran  / Sq Epi  / Non Sq Epi 1 / Bacteria Negative    Urine Creatinine 72      [07-18-22 @ 10:18]  Urine Protein 58      [07-18-22 @ 10:18]  Urine Sodium 40      [07-18-22 @ 10:18]  Urine Urea Nitrogen 585      [07-18-22 @ 10:18]  Urine Potassium 22      [07-18-22 @ 10:18]  Urine Osmolality 370      [07-18-22 @ 10:18]    Syphilis Screen (Treponema Pallidum Ab) Negative      [06-27-22 @ 02:00]    CMV PCRCMVPCR Log: NotDetec Mbq27SA/mL (06-26 @ 18:14)  CMVPCR Log: NotDetec Bro59KB/mL (06-25 @ 14:08)    Parvo PCRParvovirus B19 DNA by PCR: NotDetec IU/mL (06-26 @ 18:14)

## 2022-07-19 NOTE — PROGRESS NOTE ADULT - ASSESSMENT
66 yr old man with h/o ESRD due to DM on HD since 2019, s/p DDRT from DCD donor on 4/21/22 complicated by DGF requiring HD until 4/29/22 now admitted with seizures/status epilepticus    1. S/p DCD DDRT on 4/21/22  - creatinine slowly uptrending to 1.41 today. Electrolytes and volume status fair. Transplant ureteric stent removed 7/12. Check allograft US and DSA today.  Monitor labs and urine output.     2. Immunosuppression - Envarsus discontinued for possible neurotoxicity. Continue Belatacept, last dose on 7/18     Continue Cellcept to 500mg po bid - dose reduced on 7/16 for cytopenia and UTI      Continue Prednisone 5mg po daily      Infection prophylaxis - continue bactrim ss daily , continue Valcyte 450mg daily (CMV intermediate risk)     3. Seizure d/o -admitted with status epilepticus . Now controlled on fosphenytoin. Dose being adjusted as per levels. Neurology follow up.      4. HTN:  BP acceptable on current regimen Hydralazine 100mg q8, Nifedipine 60mg po bid, Doxazosin 4mg PO BID.     5. BPH with urine retention - continue Proscar and doxazosin. Recent bladder scan shows minimal PVR.     6. Cytopenia -  Continue epo 10,000 weekly for anemia, continue MMF to 500mg po bid.     7. Atrial fibrillation - was on Eliquis, now on therapeutic Lovenox due to drug interaction with fosphenytoin. Family comfortable with continuing with Lovenox for now rather than coumadin.     8. ESBL Klebsiella UTI - Completed ertapenem x 3 days total per transplant ID.    9. Hyponatremia: SNa slowly downtrending to 129 on 7/18.. Urine studies reviewed. SNa improved to 135 today. Monitor SNa. Avoid hypotonic fluids.

## 2022-07-19 NOTE — PROGRESS NOTE ADULT - ASSESSMENT
66 yr old Male with PMHx ESRD s/p permacath removal 5/10/22 s/p Rt DDRT 4/21/22,  CVA (2019), Afib on apixaban, seizure activity, CAD s/p stents, CABG (2020), HTN, HLD, DM on insulin, gastric/duodenal ulcer, recent hospitalization 4/28/22 -5/10/22 with weakness/anemia found to have perinephric hematoma requiring evacuation and repair of bleeding arterial anastomosis. Curently being treated for UTI with Levaquin Today after developing multiple sz episodes refractory to 5 mg versed IM (EMS) and 2 mg ativan IV in E.D. concerning for status epilepticus requiring intubation for hypoxic respiratory  failure. MICU Consult was called for hypoxic respiratory failure secondary to status epilepticus.  Nephrology consulted for renal failure.     A/P  DDRT on 04/21/22  Course complicated by DGF, was on HD until 4/29/22. Readmitted in May for anemia in the setting of large perinephric hematoma s/p evacuation and repair of arterial anastomosis on 5/2/22. Intra operative biopsy showed no rejection.  ANA likely sec to  hemodynamic change. Stable and resolving.   stent removal 07/12/22  scr trending up  transplant team on board  allograft US and DSA today.    monitor BMP, U/O     History of Hyperkalemia  Secondary to Bactrim now resolved off Bactrim.    HTN   acceptable   manage by transplant team    monitor BP closely     UTI  Urine culture with ESBL E.coli.  cefepime changed to ertapenem.     Immunosuppression per transplant team  Induction:  Thymoglobulin           Envarsus discontinued for possible neurotoxicity. Continue     Belatacept, next dose on 7/18      MMF 500mg po BID decreased because of cytopenia and UTI      Prednisone 5mg po daily       For PCP prophylaxis,   Transplant team changed atovaquone to bactrim as hyperkalemia has resolved,     CMV prophylaxis.   continue Valcyte 450mg po daily (CMV intermediate risk)     66 yr old Male with PMHx ESRD s/p permacath removal 5/10/22 s/p Rt DDRT 4/21/22,  CVA (2019), Afib on apixaban, seizure activity, CAD s/p stents, CABG (2020), HTN, HLD, DM on insulin, gastric/duodenal ulcer, recent hospitalization 4/28/22 -5/10/22 with weakness/anemia found to have perinephric hematoma requiring evacuation and repair of bleeding arterial anastomosis. Curently being treated for UTI with Levaquin Today after developing multiple sz episodes refractory to 5 mg versed IM (EMS) and 2 mg ativan IV in E.D. concerning for status epilepticus requiring intubation for hypoxic respiratory  failure. MICU Consult was called for hypoxic respiratory failure secondary to status epilepticus.  Nephrology consulted for renal failure.     A/P  DDRT on 04/21/22  Course complicated by DGF, was on HD until 4/29/22. Readmitted in May for anemia in the setting of large perinephric hematoma s/p evacuation and repair of arterial anastomosis on 5/2/22. Intra operative biopsy showed no rejection.  ANA was initially sec to  hemodynamic change   stent removal 07/12/22  scr again trending up   transplant team on board  planned for allograft US and DSA today.    monitor BMP, U/O     History of Hyperkalemia  Secondary to Bactrim now resolved off Bactrim.    HTN   acceptable   manage by transplant team    monitor BP closely     UTI  Urine culture with ESBL E.coli.  cefepime changed to ertapenem.     Immunosuppression per transplant team  Induction:  Thymoglobulin           Envarsus discontinued for possible neurotoxicity. Continue     Belatacept, next dose on 7/18      MMF 500mg po BID decreased because of cytopenia and UTI      Prednisone 5mg po daily       For PCP prophylaxis,   Transplant team changed atovaquone to bactrim as hyperkalemia has resolved,     CMV prophylaxis.   continue Valcyte 450mg po daily (CMV intermediate risk)

## 2022-07-19 NOTE — PROGRESS NOTE ADULT - SUBJECTIVE AND OBJECTIVE BOX
[facility] [place] [facility]   MD NINA MASON MD, DO SADIE RAMIREZ NP    TEL:  OFFICE: [phone]    From 5pm-7am Answering Service [phone]    -- RENAL FOLLOW UP NOTE ---Date of Service 07-19-22 @ 13:51    Patient is a 66y old  Male who presents with a chief complaint of Status Epilepticus (19 Jul 2022 13:43)      Patient seen and examined at bedside. No chest pain/sob    VITALS:  T(F): 97.9 (07-19-22 @ 13:27), Max: 99.4 (07-19-22 @ 01:00)  HR: 70 (07-19-22 @ 13:27)  BP: 158/86 (07-19-22 @ 13:27)  RR: 20 (07-19-22 @ 13:27)  SpO2: 99% (07-19-22 @ 13:27)  Wt(kg): --    07-18 @ 07:01  -  07-19 @ 07:00  --------------------------------------------------------  IN: 1720 mL / OUT: 970 mL / NET: 750 mL    07-19 @ 07:01  -  07-19 @ 13:51  --------------------------------------------------------  IN: 640 mL / OUT: 580 mL / NET: 60 mL          PHYSICAL EXAM:  Constitutional: NAD  Neck: No JVD  Respiratory: CTAB, no wheezes, rales or rhonchi  Cardiovascular: S1, S2, RRR  Gastrointestinal: BS+, soft, NT/ND  Extremities: No peripheral edema    Hospital Medications:   MEDICATIONS  (STANDING):  atorvastatin 40 milliGRAM(s) Oral at bedtime  chlorhexidine 2% Cloths 1 Application(s) Topical <User Schedule>  doxazosin 4 milliGRAM(s) Oral <User Schedule>  enoxaparin Injectable 60 milliGRAM(s) SubCutaneous every 12 hours  epoetin cortney-epbx (RETACRIT) Injectable 90148 Unit(s) SubCutaneous every 7 days  finasteride 5 milliGRAM(s) Oral daily  hydrALAZINE 100 milliGRAM(s) Oral every 8 hours  insulin glargine Injectable (LANTUS) 4 Unit(s) SubCutaneous at bedtime  insulin lispro (ADMELOG) corrective regimen sliding scale   SubCutaneous three times a day with meals  insulin lispro (ADMELOG) corrective regimen sliding scale   SubCutaneous at bedtime  insulin lispro Injectable (ADMELOG) 3 Unit(s) SubCutaneous three times a day before meals  levothyroxine 50 MICROGram(s) Oral daily  melatonin 6 milliGRAM(s) Oral at bedtime  mycophenolate mofetil Suspension 500 milliGRAM(s) Oral <User Schedule>  NIFEdipine XL 60 milliGRAM(s) Oral two times a day  nystatin    Suspension 439794 Unit(s) Oral four times a day  pantoprazole    Tablet 40 milliGRAM(s) Oral daily  phenytoin   Capsule 100 milliGRAM(s) Oral every 8 hours  polyethylene glycol 3350 17 Gram(s) Oral daily  predniSONE  Solution 5 milliGRAM(s) Oral daily  trimethoprim   80 mG/sulfamethoxazole 400 mG 1 Tablet(s) Oral daily  valGANciclovir 450 milliGRAM(s) Oral daily      LABS:  07-19    135  |  100  |  41<H>  ----------------------------<  98  4.6   |  23  |  1.41<H>    Ca    8.6      19 Jul 2022 06:03  Phos  2.6     07-19  Mg     2.0     07-19    TPro  5.8<L>  /  Alb  2.3<L>  /  TBili  0.2  /  DBili      /  AST  17  /  ALT  10  /  AlkPhos  124<H>  07-19    Creatinine Trend: 1.41 <--, 1.34 <--, 1.26 <--, 1.21 <--, 1.18 <--, 1.08 <--, 1.10 <--    Albumin, Serum: 2.3 g/dL (07-19 @ 06:03)  Phosphorus Level, Serum: 2.6 mg/dL (07-19 @ 06:03)                              8.3    2.83  )-----------( 295      ( 19 Jul 2022 06:05 )             26.4     Urine Studies:  Urinalysis - [07-18-22 @ 10:17]      Color Light Yellow / Appearance Clear / SG 1.018 / pH 6.0      Gluc Negative / Ketone Negative  / Bili Negative / Urobili Negative       Blood Negative / Protein 30 mg/dL / Leuk Est Negative / Nitrite Negative      RBC 3 / WBC 2 / Hyaline 0 / Gran  / Sq Epi  / Non Sq Epi 1 / Bacteria Negative    Urine Creatinine 72      [07-18-22 @ 10:18]  Urine Protein 58      [07-18-22 @ 10:18]  Urine Sodium 40      [07-18-22 @ 10:18]  Urine Urea Nitrogen 585      [07-18-22 @ 10:18]  Urine Potassium 22      [07-18-22 @ 10:18]  Urine Osmolality 370      [07-18-22 @ 10:18]    Iron 17, TIBC 171, %sat 10      [05-02-22 @ 13:03]  Ferritin 1505      [05-02-22 @ 13:05]  PTH -- (Ca 9.2)      [02-05-22 @ 10:13]   294  HbA1c 6.0      [02-21-20 @ 08:53]  TSH 4.69      [07-06-22 @ 18:36]      Syphilis Screen (Treponema Pallidum Ab) Negative      [06-27-22 @ 02:00]    RADIOLOGY & ADDITIONAL STUDIES:

## 2022-07-19 NOTE — PROGRESS NOTE ADULT - ASSESSMENT
66M with h/o DM type II, HTN, CAD s/p CABG in 2020, Afib on Eliquis, CVA in 2019 due to Afib, Seizure d/o (last episode was on 4/8/22), h/o GI bleed in 2/2022 EGD with duodenal ulcer and ESRD on HD s/p R DDRT from DCD donor on 4/21/22 complicated by DGF requiring HD until 4/29/22 now with good graft function who presented with status epilepticus in setting of UTI/antibiotics and sub-therapeutic valproic acid level     Seizure Disorder:  - Repeat MRI head 6/27 with less concerns for PRES, likely ischemic changes  - Remains seizure free  - Antiepileptic regimen per Neurology increased phenytoin 100mg TID  - Keep Mag > 2    R DCD DDRT (ureter stented) 4/21/22:  - Good graft function, mild rise in Cr.  - Renal US today, send DSA  - Strict I/Os, Continue Doxazosin/Proscar  - ESBL Klebsiella UTI (post stent removal): completed Ertapenem.   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 Vocal cords good. Reflux precautions (no spicy food and caffeine once has diet). Voice rest. overall improving  - OOB with RN/PT    Immunosuppression:   - Belatacept: 6/23, 7/4, 4/18 (initial loading). Re-loaded 7/8 as there was a gap between doses 2/2 seizures.  - Cellcept to 500 BID (1/2 dose given ESBL Kleb UTI, will continue at this dose given leukopenia)  - continue Pred 5  - PPx: Valcyte/PPI/nystatin (thrush)/Bactrim    DM  - on Lantus/Lispro, Endocrine following     AFib:  - continue Retacrit weekly  - Eliquis with ~40% less efficacy while on fosphenytoin.  On Lovenox 60BID. Plan to continue switch to Coumadin once discharged home from rehab. Plan discussed with neurology and family  - rate controlled    R IJ DVT  - On Lovenox 60BID    HTN:  - Nifedipine/Hydralazine/Cardura. adjust prn    DISPO  -overall with good progress. now approved for Acute Rehab, will discuss with CM and family  -to f/u with Dr. Andre Patterson, Epilepsy team upon d/c per family request

## 2022-07-19 NOTE — PROGRESS NOTE ADULT - ASSESSMENT
66 yr old M with Type 2 DM> unknown control due to skewed A1C 6.6% secondary to chronic anemia and s/p blood tx. On basal/bolus insulin therapy PTA. DM c/b ESRD s/p DDRT on 3/21, CVA, CAD s/p CABG, Afib on apixaban, seizure activity, HTN, HLD, h/o GI bleed in 2/2022 EGD with duodenal ulcer, discharged to [facility] rehab center after prior hospitalization, now re-admitted from [facility] for seizures c/f status epilepticus in setting of UTI. endocrine consulted for steroid induced hyperglycemia, now on home dose prednisone 5mg. Prolonged hospital course w/ ICU stay, previously intubated/extubated, previous seizures. S/p ureteral stent removal 7/12/22 pt is now off tube feeds and tolerating PO diet.     Type 2 diabetes mellitus with hypoglycemia, with long-term current use of insulin.   ·  Plan: Test BG AC/HS BG Goal 100-180mg/dl   -FBG tightly controlled, given rise in Cr today reduce to  Lantus 4 units QHS for now.   -c/w ademelog 3 unit premeals (hold if not eating)  -low Admelog  correction scale AC meals and Low HS scale  -Please inform endo team of any changes on steroid therapy or PO/TF status  -cons carb diet    Discharge  plan to rehab, c/w basal bolus insulin final doses TBD   Outpatient f/u with Endocrinologist Dr. Lincoln. [address]. Phone  Needs apt at time of discharge  -Needs optho/renal/cardiac f/u as out pt  -Make sure pt has insulin sand DM supplies at time of discharge.     Problem/Plan - 2:  ·  Problem: HTN (hypertension).   ·  Plan: BP goal in DM   being managed  by primary team   currently not on any BP agents  outpt urine mc.cr ratio to be done.     Problem/Plan - 3:  ·  Problem: Hypothyroidism.   ·  Plan: -Currently on levothyroxine Injectable 40 MICROGram(s) IV Push at bedtime  -now that the pt is taking PO would  recommend to restart LT4 50 mcg daily on an empty stomach at least 1 hour apart from food or PO meds  - monitor TFTs outpatient in 4 weeks after dc with Dr. Lincoln    Discussed with patient and primary  team Transplant   Contact via [employer] Teams during business hours  On evenings and weekends, please call [phone] or page endocrine fellow on call.   Please note that this patient may be followed by different provider tomorrow.    Time spent on encounter: 26 minutes spent on total encounter; The necessity of the time spent during the encounter on this date of service was due to development of plan of care/coordination of care/glycemic control through review of labs, blood glucose values and vital signs.  66 yr old M with Type 2 DM> unknown control due to skewed A1C 6.6% secondary to chronic anemia and s/p blood tx. On basal/bolus insulin therapy PTA. DM c/b ESRD s/p DDRT on 3/21, CVA, CAD s/p CABG, Afib on apixaban, seizure activity, HTN, HLD, h/o GI bleed in 2/2022 EGD with duodenal ulcer, discharged to [facility] rehab center after prior hospitalization, now re-admitted from [facility] for seizures c/f status epilepticus in setting of UTI. endocrine consulted for steroid induced hyperglycemia, now on home dose prednisone 5mg. Prolonged hospital course w/ ICU stay, previously intubated/extubated, previous seizures. S/p ureteral stent removal 7/12/22 pt is now off tube feeds and tolerating PO diet.     Type 2 diabetes mellitus with hypoglycemia, with long-term current use of insulin.   ·  Plan: Test BG AC/HS BG Goal 100-180mg/dl   -FBG tightly controlled, given rise in Cr today reduce to  Lantus 4 units QHS for now.   -c/w ademelog 3 unit premeals (hold if not eating)  -low Admelog  correction scale AC meals and Low HS scale  -Please inform endo team of any changes on steroid therapy or PO/TF status  -cons carb diet    Discharge  plan to rehab, c/w basal bolus insulin final doses TBD   Outpatient f/u with Endocrinologist Dr. Lincoln. [address] [facility] [facility] [address]. Phone  Needs apt at time of discharge  -Needs optho/renal/cardiac f/u as out pt  -Make sure pt has insulin sand DM supplies at time of discharge.     Problem/Plan - 2:  ·  Problem: HTN (hypertension).   ·  Plan: BP goal in DM   being managed  by primary team   currently not on any BP agents  outpt urine mc.cr ratio to be done.     Problem/Plan - 3:  ·  Problem: Hypothyroidism.   ·  Plan:   c/w LT4 50 mcg daily on an empty stomach at least 1 hour apart from food or PO meds  - monitor TFTs outpatient in 4 weeks after dc with Dr. Lincoln    Discussed with patient and primary  team Transplant   Contact via [employer] Teams during business hours  On evenings and weekends, please call [phone] or page endocrine fellow on call.   Please note that this patient may be followed by different provider tomorrow.    Time spent on encounter: 26 minutes spent on total encounter; The necessity of the time spent during the encounter on this date of service was due to development of plan of care/coordination of care/glycemic control through review of labs, blood glucose values and vital signs.

## 2022-07-19 NOTE — PROGRESS NOTE ADULT - NS ATTEND AMEND GEN_ALL_CORE FT
slight rise in creatinine  immunosuppression belatacept, mmf and steroids  will monitor closely  send DSAs

## 2022-07-20 NOTE — PROGRESS NOTE ADULT - SUBJECTIVE AND OBJECTIVE BOX
Contact info:   Jose Brooks MD  pager [phone], please provide 10 digit call back number.   You may also contact me on [employer] Teams during business hours today.   Please note that this patient may be followed by another provider tomorrow.   If no answer or after hours, please contact [phone]    Interval History/Subjective:  Patient with good appetite.   Had nephro drink along with breakfast.   Glucose elevated at l unch time.     MEDICATIONS  (STANDING):  atorvastatin 40 milliGRAM(s) Oral at bedtime  carvedilol 6.25 milliGRAM(s) Oral every 12 hours  chlorhexidine 2% Cloths 1 Application(s) Topical <User Schedule>  doxazosin 4 milliGRAM(s) Oral <User Schedule>  enoxaparin Injectable 60 milliGRAM(s) SubCutaneous every 12 hours  epoetin cortney-epbx (RETACRIT) Injectable 62108 Unit(s) SubCutaneous every 7 days  finasteride 5 milliGRAM(s) Oral daily  hydrALAZINE 100 milliGRAM(s) Oral every 8 hours  insulin glargine Injectable (LANTUS) 5 Unit(s) SubCutaneous at bedtime  insulin lispro (ADMELOG) corrective regimen sliding scale   SubCutaneous three times a day with meals  insulin lispro (ADMELOG) corrective regimen sliding scale   SubCutaneous at bedtime  insulin lispro Injectable (ADMELOG) 5 Unit(s) SubCutaneous three times a day before meals  levothyroxine 50 MICROGram(s) Oral daily  melatonin 6 milliGRAM(s) Oral at bedtime  mycophenolate mofetil Suspension 500 milliGRAM(s) Oral <User Schedule>  NIFEdipine XL 60 milliGRAM(s) Oral two times a day  nystatin    Suspension 973359 Unit(s) Oral four times a day  pantoprazole    Tablet 40 milliGRAM(s) Oral daily  phenytoin   Capsule 100 milliGRAM(s) Oral every 8 hours  polyethylene glycol 3350 17 Gram(s) Oral daily  predniSONE  Solution 5 milliGRAM(s) Oral daily  trimethoprim   80 mG/sulfamethoxazole 400 mG 1 Tablet(s) Oral daily  valGANciclovir 450 milliGRAM(s) Oral daily    MEDICATIONS  (PRN):  acetaminophen     Tablet .. 650 milliGRAM(s) Oral every 6 hours PRN Mild Pain (1 - 3)      Allergies    No Known Allergies    Intolerances      Review of Systems:  Constitutional: No fever  Eyes: No blurry vision  Neuro: No tremors  HEENT: No pain  Cardiovascular: No chest pain, palpitations  Respiratory: No SOB, no cough  GI: No nausea, vomiting, abdominal pain  : No dysuria  Skin: no rash  Psych: no depression  Endocrine: no polyuria, polydipsia  Hem/lymph: no swelling    ALL OTHER SYSTEMS REVIEWED AND NEGATIVE    PHYSICAL EXAM:  VITALS: T(C): 37.1 (07-20-22 @ 17:00)  T(F): 98.7 (07-20-22 @ 17:00), Max: 99.4 (07-20-22 @ 08:56)  HR: 77 (07-20-22 @ 17:00) (72 - 86)  BP: 169/80 (07-20-22 @ 17:00) (144/64 - 176/87)  RR:  (18 - 20)  SpO2:  (95% - 99%)  Wt(kg): --  GENERAL: NAD  EYES: No proptosis, no lid lag, anicteric  HEENT:  Atraumatic, Normocephalic, moist mucous membranes  RESPIRATORY: nonlabored respirations  PSYCH: Alert and oriented x 3, normal affect, normal mood      POCT Blood Glucose.: 272 mg/dL (07-20-22 @ 17:21)  POCT Blood Glucose.: 319 mg/dL (07-20-22 @ 13:32)  POCT Blood Glucose.: 253 mg/dL (07-20-22 @ 12:24)  POCT Blood Glucose.: 153 mg/dL (07-20-22 @ 08:25)  POCT Blood Glucose.: 117 mg/dL (07-19-22 @ 21:14)  POCT Blood Glucose.: 144 mg/dL (07-19-22 @ 18:50)  POCT Blood Glucose.: 91 mg/dL (07-19-22 @ 17:10)  POCT Blood Glucose.: 164 mg/dL (07-19-22 @ 14:27)  POCT Blood Glucose.: 135 mg/dL (07-19-22 @ 12:43)  POCT Blood Glucose.: 112 mg/dL (07-19-22 @ 09:07)  POCT Blood Glucose.: 113 mg/dL (07-18-22 @ 21:06)  POCT Blood Glucose.: 114 mg/dL (07-18-22 @ 17:17)  POCT Blood Glucose.: 165 mg/dL (07-18-22 @ 12:25)  POCT Blood Glucose.: 182 mg/dL (07-18-22 @ 08:20)  POCT Blood Glucose.: 209 mg/dL (07-17-22 @ 21:22)      07-20    132<L>  |  100  |  45<H>  ----------------------------<  115<H>  4.6   |  20<L>  |  1.50<H>    eGFR: 51<L>    Ca    8.5      07-20  Mg     1.9     07-20  Phos  3.1     07-20    TPro  5.9<L>  /  Alb  2.2<L>  /  TBili  0.2  /  DBili  x   /  AST  25  /  ALT  12  /  AlkPhos  118  07-20        Thyroid Function Tests:  07-06 @ 18:36 TSH 4.69 FreeT4 -- T3 43 Anti TPO -- Anti Thyroglobulin Ab -- TSI --

## 2022-07-20 NOTE — PROGRESS NOTE ADULT - SUBJECTIVE AND OBJECTIVE BOX
Transplant Surgery Multidisciplinary Progress Note   --------------------------------------------------------------  R DCD DDRT    4/21/22    Present: Patient seen and examined with multidisciplinary team including Transplant Surgeon: Dr. Davis, Nephrologist: Dr. Waqar Lomeli, nephrology fellow, WALDO Esparza and unit RN during am rounds.  Disciplines not in attendance will be notified of the plan.     HPI: 66M with PMH: DM type II, HTN, CAD s/p CABG in 2020, AFib on Eliquis, CVA in 2019 due to Afib, Seizure d/o following CVA, last episode was on 4/8/22, h/o GIB in 2/2022 EGD with duodenal ulcer.  ESRD due to DM was on HD since 2019.     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  Donor was 58 , KDPI 82%, DCD, single vessels and ureter, HLA mismatch 1, 2, 2. No DSA, cPRA 0%. 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 Re-admitted in May for anemia in the setting of large perinephric hematoma s/p evacuation and repair of arterial anastomosis on 5/2/22. Intra operative biopsy showed no rejection, Creatinine ranging ~2mg/dL.    In Rehab:  He was recently dx with klebsiella UTI rx with ertapenem - then switched to Levaquin day #6.    He also had parainfluenza pneumonia. Was at rehab progressing well.      Hospital course:  - 6/10: transferred from rehab facility for seizure status epilepticus. Intubated for respiratory protection and transferred to MICU.  EEG showed no seizure activity. Neuro consulted.   - 6/11: Extubated. Found to have R pleural effusion. s/p Thoracentesis 1.3L. Eliquis changed to heparin drip.  Post procedure drop in H&H. 5u PRBC, 2 u FFP.   - 6/11 evening: Transferred to SICU from MICU for further care in setting of hypoxia, significant R pleural effusion, anemia and hemodynamic instability  - 6/11 Intubated at 9pm and sedated on Precedex.  CT placed, 600ml blood drained.  1L total overnight, started pressors  - 6/11 Received Protamine for PTT >100 (was on Heparin drip started for AF), 2 u FFP and 5u PRBC total  - 6/13 s/p VATS 2.2L old blood removed; second chest tube placed  - 6/18-19: chest tubes removed  - 6/21: ?PRES vs. chronic ischemic changes on MRI, off Envarsus, switched to Belatacept 6/23 with good graft function  - 6/25: Tx to SICU for refractory seizures, improved with IV antiepileptic regimen  - 7/10: Passed bedside S&S in SICU. Downgraded from SICU to floor.   - 7/11: OR-->URETERAL STENT REMOVED  - 7/15: ESBL Kleb UTI (50-90K), completed Ertapenem x 3 Days    Interval Events:  - No acute overnight events  - HTN'sive with SBPs ~170s  - Rising SCr 1.5 (from 1.4 yest), renal doppler no acute changes  - DSA sent yest     Immunosuppression:   Induction:  Thymoglobulin                                        Maintenance:  - Belatacept: 6/23, 7/4, 7/8, 7/18. Next dose 8/1.  BID (leukopenia), Pred 5mg daily   - Ongoing monitoring for signs of rejection.    Potential Discharge date: pending clinical improvement   Education:  Medications  Plan of care:  See Below      MEDICATIONS  (STANDING):  atorvastatin 40 milliGRAM(s) Oral at bedtime  carvedilol 6.25 milliGRAM(s) Oral once  carvedilol 6.25 milliGRAM(s) Oral every 12 hours  chlorhexidine 2% Cloths 1 Application(s) Topical <User Schedule>  doxazosin 4 milliGRAM(s) Oral <User Schedule>  enoxaparin Injectable 60 milliGRAM(s) SubCutaneous every 12 hours  epoetin cortney-epbx (RETACRIT) Injectable 51421 Unit(s) SubCutaneous every 7 days  finasteride 5 milliGRAM(s) Oral daily  hydrALAZINE 100 milliGRAM(s) Oral every 8 hours  insulin glargine Injectable (LANTUS) 4 Unit(s) SubCutaneous at bedtime  insulin lispro (ADMELOG) corrective regimen sliding scale   SubCutaneous three times a day with meals  insulin lispro (ADMELOG) corrective regimen sliding scale   SubCutaneous at bedtime  insulin lispro Injectable (ADMELOG) 3 Unit(s) SubCutaneous three times a day before meals  levothyroxine 50 MICROGram(s) Oral daily  melatonin 6 milliGRAM(s) Oral at bedtime  mycophenolate mofetil Suspension 500 milliGRAM(s) Oral <User Schedule>  NIFEdipine XL 60 milliGRAM(s) Oral two times a day  nystatin    Suspension 377384 Unit(s) Oral four times a day  pantoprazole    Tablet 40 milliGRAM(s) Oral daily  phenytoin   Capsule 100 milliGRAM(s) Oral every 8 hours  polyethylene glycol 3350 17 Gram(s) Oral daily  predniSONE  Solution 5 milliGRAM(s) Oral daily  sodium chloride 0.9% Bolus 250 milliLiter(s) IV Bolus once  trimethoprim   80 mG/sulfamethoxazole 400 mG 1 Tablet(s) Oral daily  valGANciclovir 450 milliGRAM(s) Oral daily    MEDICATIONS  (PRN):  acetaminophen     Tablet .. 650 milliGRAM(s) Oral every 6 hours PRN Mild Pain (1 - 3)      PAST MEDICAL & SURGICAL HISTORY:  Hypertension  Diabetes  Dyslipidemia  CAD (Coronary Artery Disease)  with Stents in 06/2009 and 6/2019, s/p off-pump C3L on 8/13/20  Hypothyroidism  CVA (cerebral vascular accident)  12/13/19 with residual bilateral weakness  Anemia  PEG (percutaneous endoscopic gastrostomy) status  removed July 2020  Intubation of airway performed without difficulty  Dec 2019  CVA c/b status epilepticus requiring intubation and PEG in 12/2019-  ESRD on dialysis  M/W/F  Seizures  after CVA, last seizure was last week 4/07/22  History of insertion of stent into coronary artery bypass graft  2009 and 2019  S/P CABG x 3  off pump C3L on 8/13/20    Vital Signs Last 24 Hrs  T(C): 36.7 (20 Jul 2022 05:00), Max: 37.1 (19 Jul 2022 09:28)  T(F): 98.1 (20 Jul 2022 05:00), Max: 98.7 (19 Jul 2022 09:28)  HR: 82 (20 Jul 2022 05:00) (70 - 83)  BP: 165/73 (20 Jul 2022 05:00) (151/67 - 176/87)  BP(mean): 98 (19 Jul 2022 21:45) (98 - 108)  RR: 18 (20 Jul 2022 05:00) (18 - 20)  SpO2: 95% (20 Jul 2022 05:00) (95% - 99%)    I&O's Summary    19 Jul 2022 07:01  -  20 Jul 2022 07:00  --------------------------------------------------------  IN: 1220 mL / OUT: 1630 mL / NET: -410 mL                         8.7    2.72  )-----------( 323      ( 20 Jul 2022 07:29 )             26.9     07-20    132<L>  |  100  |  45<H>  ----------------------------<  115<H>  4.6   |  20<L>  |  1.50<H>    Ca    8.5      20 Jul 2022 05:56  Phos  3.1     07-20  Mg     1.9     07-20    TPro  5.9<L>  /  Alb  2.2<L>  /  TBili  0.2  /  DBili  x   /  AST  25  /  ALT  12  /  AlkPhos  118  07-20    Culture - Blood (collected 07-15-22 @ 08:36)  Source: .Blood Blood  Preliminary Report (07-16-22 @ 12:01):    No growth to date.    Culture - Blood (collected 07-15-22 @ 07:15)  Source: .Blood Blood  Preliminary Report (07-16-22 @ 18:01):    No growth to date.      REVIEW OF SYSTEMS  ------------------------------------  Gen: +fatigue, No fevers/chills, +weakness  Skin: No rashes  Head/Eyes/Ears/Mouth: No headache; Normal hearing; Normal vision w/o blurriness; No sinus pain/discomfort, sore throat  Respiratory: No dyspnea, cough, wheezing, hemoptysis  CV: No chest pain, PND, orthopnea  GI: No abdominal pain, diarrhea, constipation, nausea, vomiting, melena, hematochezia  : No increased frequency, dysuria, hematuria, nocturia  MSK: No joint pain/swelling; no back pain; no edema  Neuro: No dizziness/lightheadedness,  seizures, numbness, tingling, +weakness  Heme: No easy bruising or bleeding  Endo: No heat/cold intolerance  Psych: No significant nervousness, anxiety, stress, depression  All other systems were reviewed and are negative, except as noted.    PHYSICAL EXAM:  Constitutional: Well developed / well nourished  Eyes: Anicteric, PERRLA  ENMT: nc/at, +oral thrush improving  Neck: supple  Respiratory: CTA   Cardiovascular: RRR  Gastrointestinal: Soft, non distended, NT, RLQ incision healed   Genitourinary: voiding spontaneously  Extremities: SCD's in place and working bilaterally, overall edema improving  Vascular: Palpable dp pulses bilaterally  Neurological: AAOx3  Skin: no rashes, ulcerations or lesions  Musculoskeletal: Moving all extremities, global weakness  Psychiatric: calm, follows commands and interacts appropriately

## 2022-07-20 NOTE — PROGRESS NOTE ADULT - ASSESSMENT
66 yr old man with h/o ESRD due to DM on HD since 2019, s/p DDRT from DCD donor on 4/21/22 complicated by DGF requiring HD until 4/29/22 now admitted with seizures/status epilepticus    1. S/p DCD DDRT on 4/21/22  - creatinine slowly uptrending to 1.5. Electrolytes and volume status fair. Transplant ureteric stent removed 7/12. Renal sonogram done on 7/19 showed no hydronephrosis. MARA was not detected on US. DSA sent on 7/19/22. Pt nonoliguric with UOP of 1.6L as documented. Recommend IVF bolus 250 ml NS x 2. Monitor labs and urine output.     2. Immunosuppression - Envarsus discontinued for possible neurotoxicity. Continue Belatacept, last dose on 7/18     Continue Cellcept to 500mg po bid - dose reduced on 7/16 for cytopenia and UTI      Continue Prednisone 5mg po daily      Infection prophylaxis - continue bactrim ss daily , continue Valcyte 450mg daily (CMV intermediate risk)     3. Seizure d/o -admitted with status epilepticus . Now controlled on fosphenytoin. Dose being adjusted as per levels. Neurology following.      4. HTN:  BP acceptable on current regimen Hydralazine 100mg q8, Nifedipine 60mg po bid, Doxazosin 4mg PO BID.     5. BPH with urine retention - continue Proscar and doxazosin. Recent bladder scan shows minimal PVR.     6. Cytopenia -  Continue epo 10,000 weekly for anemia, continue MMF to 500mg po bid.     7. Atrial fibrillation - was on Eliquis, now on therapeutic Lovenox due to drug interaction with fosphenytoin. Family comfortable with continuing with Lovenox for now rather than coumadin.     8. ESBL Klebsiella UTI - Completed ertapenem x 3 days total per transplant ID.    9. Hyponatremia: SNa slowly downtrending to 129 on 7/18. Urine studies reviewed. SNa stable at 132 today. Monitor SNa. Avoid hypotonic fluids.

## 2022-07-20 NOTE — PROGRESS NOTE ADULT - ASSESSMENT
66M with h/o DM type II, HTN, CAD s/p CABG in 2020, Afib on Eliquis, CVA in 2019 due to Afib, Seizure d/o (last episode was on 4/8/22), h/o GI bleed in 2/2022 EGD with duodenal ulcer and ESRD on HD s/p R DDRT from DCD donor on 4/21/22 complicated by DGF requiring HD until 4/29/22 now with good graft function who presented with status epilepticus in setting of UTI/antibiotics and sub-therapeutic valproic acid level     Seizure Disorder:  - MRI head 6/27 with less concerns for PRES, likely ischemic changes  - Remains seizure free  - Antiepileptic regimen per Neurology increased phenytoin 100mg TID  - Keep Mag > 2    R DCD DDRT (ureter stented) 4/21/22:  - Monitor SCr, rising. Renal doppler neg. NS 250cc bolus x 2  - f/u DSA; consider Betty testing   - Continue Doxazosin/Proscar  - ESBL Klebsiella UTI (post stent removal): completed Ertapenem.   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 Vocal cords good. Reflux precautions (no spicy food and caffeine once has diet). Voice rest. overall improving  - OOB with RN/PT    Immunosuppression:   - Belatacept: 6/23, 7/4, 4/18 (initial loading). Re-loaded 7/8 as there was a gap between doses 2/2 seizures.  - Cellcept to 500 BID (1/2 dose given ESBL Kleb UTI, will continue at this dose given leukopenia)  - continue Pred 5  - PPx: Valcyte/PPI/nystatin (thrush)/Bactrim    DM  - on Lantus/Lispro, Endocrine following     AFib:  - continue Retacrit weekly  - Eliquis with ~40% less efficacy while on fosphenytoin.  On Lovenox 60BID. Plan to continue switch to Coumadin once discharged home from rehab. Plan discussed with neurology and family  - rate controlled    R IJ DVT  - On Lovenox 60BID    HTN:  - Nifedipine/Hydralazine/Cardura. adjust prn  - Start Coreg 6.25mg bid     DISPO  -overall with good progress. now approved for Acute Rehab, will discuss with CM and family  -to f/u with Dr. Andre Patterson, Epilepsy team upon d/c per family request

## 2022-07-20 NOTE — PROGRESS NOTE ADULT - SUBJECTIVE AND OBJECTIVE BOX
[facility] DIVISION OF KIDNEY DISEASES AND HYPERTENSION   FOLLOW UP NOTE    --------------------------------------------------------------------------------  HPI: 66 yr old man with h/o ESRD due to DM on HD since 2019, s/p DDRT from DCD donor on 4/21/22 complicated by DGF requiring HD until 4/29/22.   PMH: DM type II, HTN, CAD s/p CABG in 2020, Afib on Eliquis, CVA in 2019 due to Afib, Seizure d/o following CVA, h/o GI bleed in 2/2022 EGD with duodenal ulcer.  Donor was 58 , KDPI 82%, DCD, single vessels and ureter, HLA mismatch 1, 2, 2. No DSA, cPRA 0%. CMV +/+    Hospitalized May 2022 for anemia in the setting of large perinephric hematoma s/p evacuation and repair of arterial anastomosis on 5/2/22. Intra operative biopsy showed no rejection.  H/o seizure d/o with last seizure episode prior to transplant was on 4/8/22. He is readmitted for status epilepticus in the setting of sub therapeutic valproic acid levels. Course complicated by respiratory failure, hemothorax and hemorrhagic shock following right thoracentesis done on 6/10 for effusion. S/p PRBC x 5 units and vasopressors, chest tube, followed by VATS.    Envarsus discontinued and started on belatacept due to concern for neurotoxicity. Allograft function is stable, mental status is slowly improving. He is on fosphenytoin with good control of seizures.     Pt. was seen and examined today, reports feeling okay. Able to tolerate PO intake. Scr slowly uptrending to 1.5 today. SNa stable at 132 today. Renal sonogram done on 7/19 showed no hydronephrosis. MARA was not detected on US. DSA sent on 7/19/22. Pt nonoliguric with UOP of 1.6L as documented.     PAST HISTORY  --------------------------------------------------------------------------------  No significant changes to PMH, PSH, FHx, SHx, unless otherwise noted    ALLERGIES & MEDICATIONS  --------------------------------------------------------------------------------  Allergies    No Known Allergies    Intolerances      Standing Inpatient Medications  atorvastatin 40 milliGRAM(s) Oral at bedtime  carvedilol 6.25 milliGRAM(s) Oral every 12 hours  chlorhexidine 2% Cloths 1 Application(s) Topical <User Schedule>  doxazosin 4 milliGRAM(s) Oral <User Schedule>  enoxaparin Injectable 60 milliGRAM(s) SubCutaneous every 12 hours  epoetin cortney-epbx (RETACRIT) Injectable 71802 Unit(s) SubCutaneous every 7 days  finasteride 5 milliGRAM(s) Oral daily  hydrALAZINE 100 milliGRAM(s) Oral every 8 hours  insulin glargine Injectable (LANTUS) 4 Unit(s) SubCutaneous at bedtime  insulin lispro (ADMELOG) corrective regimen sliding scale   SubCutaneous three times a day with meals  insulin lispro (ADMELOG) corrective regimen sliding scale   SubCutaneous at bedtime  insulin lispro Injectable (ADMELOG) 3 Unit(s) SubCutaneous three times a day before meals  levothyroxine 50 MICROGram(s) Oral daily  melatonin 6 milliGRAM(s) Oral at bedtime  mycophenolate mofetil Suspension 500 milliGRAM(s) Oral <User Schedule>  NIFEdipine XL 60 milliGRAM(s) Oral two times a day  nystatin    Suspension 943967 Unit(s) Oral four times a day  pantoprazole    Tablet 40 milliGRAM(s) Oral daily  phenytoin   Capsule 100 milliGRAM(s) Oral every 8 hours  polyethylene glycol 3350 17 Gram(s) Oral daily  predniSONE  Solution 5 milliGRAM(s) Oral daily  trimethoprim   80 mG/sulfamethoxazole 400 mG 1 Tablet(s) Oral daily  valGANciclovir 450 milliGRAM(s) Oral daily    PRN Inpatient Medications  acetaminophen     Tablet .. 650 milliGRAM(s) Oral every 6 hours PRN      VITALS/PHYSICAL EXAM  --------------------------------------------------------------------------------  T(C): 36.6 (07-20-22 @ 12:55), Max: 37.4 (07-20-22 @ 08:56)  HR: 72 (07-20-22 @ 12:55) (72 - 86)  BP: 146/69 (07-20-22 @ 12:55) (144/64 - 176/87)  RR: 20 (07-20-22 @ 12:55) (18 - 20)  SpO2: 98% (07-20-22 @ 12:55) (95% - 99%)  Wt(kg): --      07-19-22 @ 07:01  -  07-20-22 @ 07:00  --------------------------------------------------------  IN: 1220 mL / OUT: 1630 mL / NET: -410 mL    07-20-22 @ 07:01  -  07-20-22 @ 15:59  --------------------------------------------------------  IN: 730 mL / OUT: 370 mL / NET: 360 mL      Physical Exam:  	Gen: NAD  	HEENT: Anicteric  	Pulm: CTA B/L  	CV: S1S2+  	Abd: Soft, +BS               Transplant site: RLQ non tender, well healed surgical scar+  	Ext: No LE edema B/L  	Neuro: Awake  	Skin: Warm and dry    LABS/STUDIES  --------------------------------------------------------------------------------              8.7    2.72  >-----------<  323      [07-20-22 @ 07:29]              26.9     132  |  100  |  45  ----------------------------<  115      [07-20-22 @ 05:56]  4.6   |  20  |  1.50        Ca     8.5     [07-20-22 @ 05:56]      Mg     1.9     [07-20-22 @ 05:56]      Phos  3.1     [07-20-22 @ 05:56]    Creatinine Trend:  SCr 1.50 [07-20 @ 05:56]  SCr 1.41 [07-19 @ 06:03]  SCr 1.34 [07-18 @ 06:09]  SCr 1.26 [07-17 @ 06:25]  SCr 1.21 [07-16 @ 06:19]    Urinalysis - [07-18-22 @ 10:17]      Color Light Yellow / Appearance Clear / SG 1.018 / pH 6.0      Gluc Negative / Ketone Negative  / Bili Negative / Urobili Negative       Blood Negative / Protein 30 mg/dL / Leuk Est Negative / Nitrite Negative      RBC 3 / WBC 2 / Hyaline 0 / Gran  / Sq Epi  / Non Sq Epi 1 / Bacteria Negative    Urine Creatinine 72      [07-18-22 @ 10:18]  Urine Protein 58      [07-18-22 @ 10:18]  Urine Sodium 40      [07-18-22 @ 10:18]  Urine Urea Nitrogen 585      [07-18-22 @ 10:18]  Urine Potassium 22      [07-18-22 @ 10:18]  Urine Osmolality 370      [07-18-22 @ 10:18]    Syphilis Screen (Treponema Pallidum Ab) Negative      [06-27-22 @ 02:00]    CMV PCRCMVPCR Log: NotDetec Fwi38PD/mL (06-26 @ 18:14)  CMVPCR Log: NotDetec Vhq82FK/mL (06-25 @ 14:08)    Parvo PCRParvovirus B19 DNA by PCR: NotDetec IU/mL (06-26 @ 18:14)

## 2022-07-20 NOTE — PROGRESS NOTE ADULT - NS ATTEND AMEND GEN_ALL_CORE FT
creatinine up to 1.5  immunosuppression jitendra, mmf and steroids  DSAs negative  will start gentle hydration and monitor  continue physical therapy

## 2022-07-20 NOTE — PROGRESS NOTE ADULT - ATTENDING COMMENTS
Pt feeling better slowly.  Creatinine is slowly uptrending.  Sodium improved.  Will try gentle hydration for ANA.    Slight hip edema on exam.   Check DSA, renal sonogram ok.    Cont belatacept, MMF 500mgs BID and prednisone.  Last dose belatacept on 7/18

## 2022-07-20 NOTE — CHART NOTE - NSCHARTNOTEFT_GEN_A_CORE
Nutrition Follow Up Note  Patient seen for malnutrition follow up.     Chart reviewed, events noted.    As per chart: "Pt 66 M with h/o DM type II, HTN, CAD s/p CABG in , Afib on Eliquis, CVA in 2019 due to Afib, Seizure d/o (last episode was on 22), h/o GI bleed in 2022 EGD with duodenal ulcer and ESRD on HD s/p R DDRT from DCD donor on 22 complicated by DGF requiring HD until 22, now with good graft function who presented with status epilepticus in setting of UTI/antibiotics and sub-therapeutic valproic acid level." Previously on tube feedings, S/P VFSS/MBS () recommending regular diet, thin liquids.     Source: [] Patient       [x] EMR        [] RN        [x] Family at bedside, son (preferred to answer)      [] Other:    -If unable to interview patient: [] Trach/Vent/BiPAP  [] Disoriented/confused/inappropriate to interview as per chart     Son reports pt with good appetite and PO intake, states pt is consuming between 75 and 100% of his meals. Reports pt drinking Nepro 2xday. Denies pt with difficulty chewing or swallowing. Denies pt with nausea, vomiting, diarrhea, or constipation, last BM (). Encouraged to continue adequate kcal and protein intake. Pt and son deny having questions/concerns about diet and nutrition - made aware RD remains available.     Diet Order:   Diet, Consistent Carbohydrate Renal/No Snacks:   Supplement Feeding Modality:  Oral  Nepro Cans or Servings Per Day:  2       Frequency:  Daily (-)    Weights:   Daily Weight in k.5 (), Weight in k.1 (), Weight in k.4 (), Weight in k.6 (-), Weight in k (-15), Weight in k.3 () -?accuracy of weights and fluctuations as pt with edema.     Nutritionally Pertinent MEDICATIONS  (STANDING):  atorvastatin  carvedilol  doxazosin  finasteride  hydrALAZINE  insulin glargine Injectable (LANTUS)  insulin lispro (ADMELOG) corrective regimen sliding scale  insulin lispro (ADMELOG) corrective regimen sliding scale  insulin lispro Injectable (ADMELOG)  levothyroxine  NIFEdipine XL  nystatin    Suspension  pantoprazole    Tablet  polyethylene glycol 3350  predniSONE  Solution  trimethoprim   80 mG/sulfamethoxazole 400 mG  valGANciclovir    Pertinent Labs: ( @ 05:56): Na 132<L>, BUN 45<H>, Cr 1.50<H>, <H>, K+ 4.6, Phos 3.1, Mg 1.9, Alk Phos 118, ALT/SGPT 12, AST/SGOT 25    A1C with Estimated Average Glucose Result: 6.0 % (22 @ 05:49)  A1C with Estimated Average Glucose Result: 6.6 % (22 @ 17:12)  A1C with Estimated Average Glucose Result: 7.3 % (22 @ 13:42)  A1C with Estimated Average Glucose Result: 7.2 % (22 @ 05:48)    Finger Sticks:  POCT Blood Glucose.: 253 mg/dL ( @ 12:24)  POCT Blood Glucose.: 153 mg/dL ( @ 08:25)  POCT Blood Glucose.: 117 mg/dL ( @ 21:14)  POCT Blood Glucose.: 144 mg/dL ( @ 18:50)  POCT Blood Glucose.: 91 mg/dL ( @ 17:10)  POCT Blood Glucose.: 164 mg/dL ( @ 14:27)    Skin per nursing documentation: pressure ulcers in sacrum stage 2 and dave. buttocks suspected deep tissue injury.   Edema as per flow sheets: +1 scrotum (previously +2 dave. ankle and +3 scrotum and dave. wrist and hand edema).      Estimated Needs:   [x] no change since previous assessment (meets estimated needs for pressure ulcer healing).   [] recalculated:     Previous Nutrition Diagnoses:  [x] Malnutrition; severe  [x] Increased Nutrient Needs      Nutrition Diagnoses are: [x] ongoing - addressed with PO intake encouragement and nutritional supplements.     New Nutrition Diagnosis: [x] Not applicable    Nutrition Care Plan:  [x] In Progress      Nutrition Interventions:     Education Provided:       [] Yes  [x] No    Recommendations:      1. Defer diet/fluid consistencies to medical team/SLP recommendations. Recommend Consistent Carbohydrate with snack diet upon discharge. Recommend follow up visit with Transplant MD and outpatient RD for dietary modifications as warranted.   2. Continue Nepro 2x daily (850 kcals, 38 g protein) to optimize kcal and protein intake.   3. Encourage PO intake and honor food preferences.   4. Recommend Multivitamin and Vitamin C if medically feasible to optimize nutrient intake and further aid with pressure ulcer healing.   5. Continue to obtain weights as able to identify changes if any.     Monitoring and Evaluation:   Continue to monitor as able tolerance to diet prescription, weights, labs, skin integrity    RD remains available upon request and will follow up per protocol  Carina Fuller MS RDN CDN [facility] CCTD #[phone]. Nutrition Follow Up Note  Patient seen for malnutrition follow up.     Chart reviewed, events noted.    As per chart: "Pt 66 M with h/o DM type II, HTN, CAD s/p CABG in , Afib on Eliquis, CVA in 2019 due to Afib, Seizure d/o (last episode was on 22), h/o GI bleed in 2022 EGD with duodenal ulcer and ESRD on HD s/p R DDRT from DCD donor on 22 complicated by DGF requiring HD until 22, now with good graft function who presented with status epilepticus in setting of UTI/antibiotics and sub-therapeutic valproic acid level." Previously on tube feedings, S/P VFSS/MBS () recommending regular diet, thin liquids.     Source: [] Patient       [x] EMR        [] RN        [x] Family at bedside, son (preferred to answer)      [] Other:    -If unable to interview patient: [] Trach/Vent/BiPAP  [] Disoriented/confused/inappropriate to interview as per chart     Son reports pt with good appetite and PO intake, states pt is consuming between 75 and 100% of his meals. Reports pt drinking Nepro 2xday. Denies pt with difficulty chewing or swallowing. Denies pt with nausea, vomiting, diarrhea, or constipation, last BM (). Encouraged to continue adequate kcal and protein intake. Pt and son deny having questions/concerns about diet and nutrition - made aware RD remains available.     Diet Order:   Diet, Consistent Carbohydrate Renal/No Snacks:   Supplement Feeding Modality:  Oral  Nepro Cans or Servings Per Day:  2       Frequency:  Daily (-)    Weights:   Daily Weight in k.5 (), Weight in k.1 (), Weight in k.4 (), Weight in k.6 (-), Weight in k (-15), Weight in k.3 () -?accuracy of weights and fluctuations as pt with edema.     Nutritionally Pertinent MEDICATIONS  (STANDING):  atorvastatin  carvedilol  doxazosin  finasteride  hydrALAZINE  insulin glargine Injectable (LANTUS)  insulin lispro (ADMELOG) corrective regimen sliding scale  insulin lispro (ADMELOG) corrective regimen sliding scale  insulin lispro Injectable (ADMELOG)  levothyroxine  NIFEdipine XL  nystatin    Suspension  pantoprazole    Tablet  polyethylene glycol 3350  predniSONE  Solution  trimethoprim   80 mG/sulfamethoxazole 400 mG  valGANciclovir    Pertinent Labs: ( @ 05:56): Na 132<L>, BUN 45<H>, Cr 1.50<H>, <H>, K+ 4.6, Phos 3.1, Mg 1.9, Alk Phos 118, ALT/SGPT 12, AST/SGOT 25    A1C with Estimated Average Glucose Result: 6.0 % (22 @ 05:49)  A1C with Estimated Average Glucose Result: 6.6 % (22 @ 17:12)  A1C with Estimated Average Glucose Result: 7.3 % (22 @ 13:42)  A1C with Estimated Average Glucose Result: 7.2 % (22 @ 05:48)    Finger Sticks:  POCT Blood Glucose.: 253 mg/dL ( @ 12:24)  POCT Blood Glucose.: 153 mg/dL ( @ 08:25)  POCT Blood Glucose.: 117 mg/dL ( @ 21:14)  POCT Blood Glucose.: 144 mg/dL ( @ 18:50)  POCT Blood Glucose.: 91 mg/dL ( @ 17:10)  POCT Blood Glucose.: 164 mg/dL ( @ 14:27)    Skin per nursing documentation: pressure ulcers in sacrum stage 2 and dave. buttocks suspected deep tissue injury.   Edema as per flow sheets: +1 scrotum (previously +2 dave. ankle and +3 scrotum and dave. wrist and hand edema).      Estimated Needs:   [x] no change since previous assessment (meets estimated needs for pressure ulcer healing).   [] recalculated:     Previous Nutrition Diagnoses:  [x] Malnutrition; severe  [x] Increased Nutrient Needs      Nutrition Diagnoses are: [x] ongoing - addressed with PO intake encouragement and nutritional supplements.     New Nutrition Diagnosis: [x] Not applicable    Nutrition Care Plan:  [x] In Progress      Nutrition Interventions:     Education Provided:       [] Yes  [x] No    Recommendations:      1. Defer diet/fluid consistencies to medical team/SLP recommendations. Recommend Consistent Carbohydrate with snack diet upon discharge. Spoke to transplant team. Recommend follow up visit with Transplant MD and outpatient RD for dietary modifications as warranted.   2. Continue Nepro 2x daily (850 kcals, 38 g protein) to optimize kcal and protein intake.   3. Encourage PO intake and honor food preferences.   4. Recommend Multivitamin and Vitamin C if medically feasible to optimize nutrient intake and further aid with pressure ulcer healing.   5. Continue to obtain weights as able to identify changes if any.     Monitoring and Evaluation:   Continue to monitor as able tolerance to diet prescription, weights, labs, skin integrity    RD remains available upon request and will follow up per protocol  Carina Fuller MS RDN CDN [facility] CCTD #[phone].

## 2022-07-20 NOTE — PROGRESS NOTE ADULT - ASSESSMENT
66 yr old Male with PMHx ESRD s/p permacath removal 5/10/22 s/p Rt DDRT 4/21/22,  CVA (2019), Afib on apixaban, seizure activity, CAD s/p stents, CABG (2020), HTN, HLD, DM on insulin, gastric/duodenal ulcer, recent hospitalization 4/28/22 -5/10/22 with weakness/anemia found to have perinephric hematoma requiring evacuation and repair of bleeding arterial anastomosis. Curently being treated for UTI with Levaquin Today after developing multiple sz episodes refractory to 5 mg versed IM (EMS) and 2 mg ativan IV in E.D. concerning for status epilepticus requiring intubation for hypoxic respiratory  failure. MICU Consult was called for hypoxic respiratory failure secondary to status epilepticus.  Nephrology consulted for renal failure.     A/P  DDRT on 04/21/22  Course complicated by DGF, was on HD until 4/29/22. Readmitted in May for anemia in the setting of large perinephric hematoma s/p evacuation and repair of arterial anastomosis on 5/2/22. Intra operative biopsy showed no rejection.  ANA was initially sec to  hemodynamic change   stent removal 07/12/22  scr again trending up - being worked up   allograft US negative   transplant team on board  monitor BMP, U/O     History of Hyperkalemia  Secondary to Bactrim now resolved off Bactrim.    HTN   optimal    manage by transplant team    monitor BP closely     UTI  Urine culture with ESBL E.coli.  cefepime changed to ertapenem.     Immunosuppression per transplant team  Induction:  Thymoglobulin           Envarsus discontinued for possible neurotoxicity. Continue     Belatacept, next dose on 7/18      MMF 500mg po BID decreased because of cytopenia and UTI      Prednisone 5mg po daily       For PCP prophylaxis,   Transplant team changed atovaquone to bactrim as hyperkalemia has resolved,     CMV prophylaxis.   continue Valcyte 450mg po daily (CMV intermediate risk)     66 yr old Male with PMHx ESRD s/p permacath removal 5/10/22 s/p Rt DDRT 4/21/22,  CVA (2019), Afib on apixaban, seizure activity, CAD s/p stents, CABG (2020), HTN, HLD, DM on insulin, gastric/duodenal ulcer, recent hospitalization 4/28/22 -5/10/22 with weakness/anemia found to have perinephric hematoma requiring evacuation and repair of bleeding arterial anastomosis. Curently being treated for UTI with Levaquin Today after developing multiple sz episodes refractory to 5 mg versed IM (EMS) and 2 mg ativan IV in E.D. concerning for status epilepticus requiring intubation for hypoxic respiratory  failure. MICU Consult was called for hypoxic respiratory failure secondary to status epilepticus.  Nephrology consulted for renal failure.     A/P  DDRT on 04/21/22  Course complicated by DGF, was on HD until 4/29/22. Readmitted in May for anemia in the setting of large perinephric hematoma s/p evacuation and repair of arterial anastomosis on 5/2/22. Intra operative biopsy showed no rejection.  ANA was initially sec to  hemodynamic change   stent removal 07/12/22  scr again trending up - being worked up by transplant team  allograft US negative   monitor BMP, U/O     History of Hyperkalemia  Secondary to Bactrim now resolved off Bactrim.    HTN   optimal    manage by transplant team    monitor BP closely     UTI  Urine culture with ESBL E.coli.  cefepime changed to ertapenem.     Immunosuppression per transplant team  Induction:  Thymoglobulin           Envarsus discontinued for possible neurotoxicity. Continue     Belatacept, next dose on 7/18      MMF 500mg po BID decreased because of cytopenia and UTI      Prednisone 5mg po daily       For PCP prophylaxis,   Transplant team changed atovaquone to bactrim as hyperkalemia has resolved,     CMV prophylaxis.   continue Valcyte 450mg po daily (CMV intermediate risk)

## 2022-07-20 NOTE — PROGRESS NOTE ADULT - SUBJECTIVE AND OBJECTIVE BOX
[facility]   MD NINA MASON MD, DO SADIE RAMIREZ NP    TEL:  OFFICE: [phone]    From 5pm-7am Answering Service [phone]    -- RENAL FOLLOW UP NOTE ---Date of Service 07-20-22 @ 15:43    Patient is a 66y old  Male who presents with a chief complaint of Status Epilepticus (20 Jul 2022 08:43)      Patient seen and examined at bedside. No chest pain/sob    VITALS:  T(F): 97.9 (07-20-22 @ 12:55), Max: 99.4 (07-20-22 @ 08:56)  HR: 72 (07-20-22 @ 12:55)  BP: 146/69 (07-20-22 @ 12:55)  RR: 20 (07-20-22 @ 12:55)  SpO2: 98% (07-20-22 @ 12:55)  Wt(kg): --    07-19 @ 07:01  -  07-20 @ 07:00  --------------------------------------------------------  IN: 1220 mL / OUT: 1630 mL / NET: -410 mL    07-20 @ 07:01  -  07-20 @ 15:43  --------------------------------------------------------  IN: 730 mL / OUT: 370 mL / NET: 360 mL          PHYSICAL EXAM:  Constitutional: NAD  Neck: No JVD  Respiratory: CTAB, no wheezes, rales or rhonchi  Cardiovascular: S1, S2, RRR  Gastrointestinal: BS+, soft, NT/ND  Extremities: No peripheral edema    Hospital Medications:   MEDICATIONS  (STANDING):  atorvastatin 40 milliGRAM(s) Oral at bedtime  carvedilol 6.25 milliGRAM(s) Oral every 12 hours  chlorhexidine 2% Cloths 1 Application(s) Topical <User Schedule>  doxazosin 4 milliGRAM(s) Oral <User Schedule>  enoxaparin Injectable 60 milliGRAM(s) SubCutaneous every 12 hours  epoetin cortney-epbx (RETACRIT) Injectable 14505 Unit(s) SubCutaneous every 7 days  finasteride 5 milliGRAM(s) Oral daily  hydrALAZINE 100 milliGRAM(s) Oral every 8 hours  insulin glargine Injectable (LANTUS) 4 Unit(s) SubCutaneous at bedtime  insulin lispro (ADMELOG) corrective regimen sliding scale   SubCutaneous three times a day with meals  insulin lispro (ADMELOG) corrective regimen sliding scale   SubCutaneous at bedtime  insulin lispro Injectable (ADMELOG) 3 Unit(s) SubCutaneous three times a day before meals  levothyroxine 50 MICROGram(s) Oral daily  melatonin 6 milliGRAM(s) Oral at bedtime  mycophenolate mofetil Suspension 500 milliGRAM(s) Oral <User Schedule>  NIFEdipine XL 60 milliGRAM(s) Oral two times a day  nystatin    Suspension 173739 Unit(s) Oral four times a day  pantoprazole    Tablet 40 milliGRAM(s) Oral daily  phenytoin   Capsule 100 milliGRAM(s) Oral every 8 hours  polyethylene glycol 3350 17 Gram(s) Oral daily  predniSONE  Solution 5 milliGRAM(s) Oral daily  trimethoprim   80 mG/sulfamethoxazole 400 mG 1 Tablet(s) Oral daily  valGANciclovir 450 milliGRAM(s) Oral daily      LABS:  07-20    132<L>  |  100  |  45<H>  ----------------------------<  115<H>  4.6   |  20<L>  |  1.50<H>    Ca    8.5      20 Jul 2022 05:56  Phos  3.1     07-20  Mg     1.9     07-20    TPro  5.9<L>  /  Alb  2.2<L>  /  TBili  0.2  /  DBili      /  AST  25  /  ALT  12  /  AlkPhos  118  07-20    Creatinine Trend: 1.50 <--, 1.41 <--, 1.34 <--, 1.26 <--, 1.21 <--, 1.18 <--, 1.08 <--    Phosphorus Level, Serum: 3.1 mg/dL (07-20 @ 05:56)  Albumin, Serum: 2.2 g/dL (07-20 @ 05:56)                              8.7    2.72  )-----------( 323      ( 20 Jul 2022 07:29 )             26.9     Urine Studies:  Urinalysis - [07-18-22 @ 10:17]      Color Light Yellow / Appearance Clear / SG 1.018 / pH 6.0      Gluc Negative / Ketone Negative  / Bili Negative / Urobili Negative       Blood Negative / Protein 30 mg/dL / Leuk Est Negative / Nitrite Negative      RBC 3 / WBC 2 / Hyaline 0 / Gran  / Sq Epi  / Non Sq Epi 1 / Bacteria Negative    Urine Creatinine 72      [07-18-22 @ 10:18]  Urine Protein 58      [07-18-22 @ 10:18]  Urine Sodium 40      [07-18-22 @ 10:18]  Urine Urea Nitrogen 585      [07-18-22 @ 10:18]  Urine Potassium 22      [07-18-22 @ 10:18]  Urine Osmolality 370      [07-18-22 @ 10:18]    Iron 17, TIBC 171, %sat 10      [05-02-22 @ 13:03]  Ferritin 1505      [05-02-22 @ 13:05]  PTH -- (Ca 9.2)      [02-05-22 @ 10:13]   294  HbA1c 6.0      [02-21-20 @ 08:53]  TSH 4.69      [07-06-22 @ 18:36]      Syphilis Screen (Treponema Pallidum Ab) Negative      [06-27-22 @ 02:00]    RADIOLOGY & ADDITIONAL STUDIES:

## 2022-07-20 NOTE — PROGRESS NOTE ADULT - ASSESSMENT
66 yr old M with Type 2 DM> unknown control due to skewed A1C 6.6% secondary to chronic anemia and s/p blood tx. On basal/bolus insulin therapy PTA. DM c/b ESRD s/p DDRT on 3/21, CVA, CAD s/p CABG, Afib on apixaban, seizure activity, HTN, HLD, h/o GI bleed in 2/2022 EGD with duodenal ulcer, discharged to [facility] rehab center after prior hospitalization, now re-admitted from [facility] for seizures c/f status epilepticus in setting of UTI. endocrine consulted for steroid induced hyperglycemia, now on home dose prednisone 5mg. Prolonged hospital course w/ ICU stay, previously intubated/extubated, previous seizures. S/p ureteral stent removal 7/12/22 pt is now off tube feeds and tolerating PO diet.     1. Type 2 diabetes mellitus with hypoglycemia, with long-term current use of insulin.   ·  Plan: Test BG AC/HS BG Goal 100-180mg/dl   -Lantus 5 units at bedtime.    -Admelog 5 units tid with meals   -low Admelog  correction scale AC meals and Low HS scale  -Please inform endo team of any changes on steroid therapy or PO/TF status  -cons carb diet    Discharge  plan to rehab, c/w basal bolus insulin final doses TBD   Outpatient f/u with Endocrinologist Dr. Lincoln. [address]. Phone  Needs apt at time of discharge  -Needs optho/renal/cardiac f/u as out pt  -Make sure pt has insulin sand DM supplies at time of discharge.    2. HTN (hypertension).   ·  Plan: BP goal in DM   being managed  by primary team   currently not on any BP agents  outpt urine mc.cr ratio to be done.    3. Hypothyroidism.   ·  Plan:   c/w LT4 50 mcg daily on an empty stomach at least 1 hour apart from food or PO meds  - monitor TFTs outpatient in 4 weeks after dc with Dr. Lincoln

## 2022-07-21 NOTE — PROGRESS NOTE ADULT - SUBJECTIVE AND OBJECTIVE BOX
[facility] DIVISION OF KIDNEY DISEASES AND HYPERTENSION   FOLLOW UP NOTE    --------------------------------------------------------------------------------  HPI: 66 yr old man with h/o ESRD due to DM on HD since 2019, s/p DDRT from DCD donor on 4/21/22 complicated by DGF requiring HD until 4/29/22.   PMH: DM type II, HTN, CAD s/p CABG in 2020, Afib on Eliquis, CVA in 2019 due to Afib, Seizure d/o following CVA, h/o GI bleed in 2/2022 EGD with duodenal ulcer.  Donor was 58 , KDPI 82%, DCD, single vessels and ureter, HLA mismatch 1, 2, 2. No DSA, cPRA 0%. CMV +/+    Hospitalized May 2022 for anemia in the setting of large perinephric hematoma s/p evacuation and repair of arterial anastomosis on 5/2/22. Intra operative biopsy showed no rejection.  H/o seizure d/o with last seizure episode prior to transplant was on 4/8/22. He is readmitted for status epilepticus in the setting of sub therapeutic valproic acid levels. Course complicated by respiratory failure, hemothorax and hemorrhagic shock following right thoracentesis done on 6/10 for effusion. S/p PRBC x 5 units and vasopressors, chest tube, followed by VATS.    Envarsus discontinued and started on belatacept due to concern for neurotoxicity. Allograft function is stable, mental status is slowly improving. He is on fosphenytoin with good control of seizures. Renal sonogram done on 7/19 showed no hydronephrosis. MARA was not detected on US. DSA sent on 7/19/22 were negative.    Pt. was seen and examined today, reports feeling okay. Able to tolerate PO intake. Scr improved to 1.3 today however BUN increased to 47. SNa stable at 132 today. . Pt nonoliguric with UOP of 1.4L as documented.     PAST HISTORY  --------------------------------------------------------------------------------  No significant changes to PMH, PSH, FHx, SHx, unless otherwise noted    ALLERGIES & MEDICATIONS  --------------------------------------------------------------------------------  Allergies    No Known Allergies    Intolerances      Standing Inpatient Medications  atorvastatin 40 milliGRAM(s) Oral at bedtime  carvedilol 6.25 milliGRAM(s) Oral every 12 hours  chlorhexidine 2% Cloths 1 Application(s) Topical <User Schedule>  doxazosin 4 milliGRAM(s) Oral <User Schedule>  enoxaparin Injectable 60 milliGRAM(s) SubCutaneous every 12 hours  epoetin ocrtney-epbx (RETACRIT) Injectable 31966 Unit(s) SubCutaneous every 7 days  finasteride 5 milliGRAM(s) Oral daily  hydrALAZINE 100 milliGRAM(s) Oral every 8 hours  insulin glargine Injectable (LANTUS) 5 Unit(s) SubCutaneous at bedtime  insulin lispro (ADMELOG) corrective regimen sliding scale   SubCutaneous three times a day with meals  insulin lispro (ADMELOG) corrective regimen sliding scale   SubCutaneous at bedtime  insulin lispro Injectable (ADMELOG) 5 Unit(s) SubCutaneous three times a day before meals  levothyroxine 50 MICROGram(s) Oral daily  melatonin 6 milliGRAM(s) Oral at bedtime  mycophenolate mofetil Suspension 500 milliGRAM(s) Oral <User Schedule>  NIFEdipine XL 60 milliGRAM(s) Oral two times a day  nystatin    Suspension 691559 Unit(s) Oral four times a day  pantoprazole    Tablet 40 milliGRAM(s) Oral daily  phenytoin   Capsule 100 milliGRAM(s) Oral every 8 hours  polyethylene glycol 3350 17 Gram(s) Oral daily  predniSONE  Solution 5 milliGRAM(s) Oral daily  trimethoprim   80 mG/sulfamethoxazole 400 mG 1 Tablet(s) Oral daily    PRN Inpatient Medications  acetaminophen     Tablet .. 650 milliGRAM(s) Oral every 6 hours PRN    VITALS/PHYSICAL EXAM  --------------------------------------------------------------------------------  T(C): 36.4 (07-21-22 @ 08:50), Max: 37.2 (07-20-22 @ 20:40)  HR: 68 (07-21-22 @ 08:50) (68 - 80)  BP: 153/61 (07-21-22 @ 08:50) (146/69 - 169/80)  RR: 20 (07-21-22 @ 08:50) (18 - 20)  SpO2: 100% (07-21-22 @ 08:50) (97% - 100%)  Wt(kg): --      07-20-22 @ 07:01  -  07-21-22 @ 07:00  --------------------------------------------------------  IN: 1340 mL / OUT: 1420 mL / NET: -80 mL      Physical Exam:  	Gen: NAD  	HEENT: Anicteric  	Pulm: CTA B/L  	CV: S1S2+  	Abd: Soft, +BS               Transplant site: RLQ non tender, well healed surgical scar+  	Ext: No LE edema B/L  	Neuro: Awake  	Skin: Warm and dry      LABS/STUDIES  --------------------------------------------------------------------------------              7.7    2.42  >-----------<  342      [07-21-22 @ 06:20]              24.0     132  |  101  |  47  ----------------------------<  114      [07-21-22 @ 06:20]  4.2   |  21  |  1.37        Ca     8.4     [07-21-22 @ 06:20]      Mg     1.8     [07-21-22 @ 06:20]      Phos  3.3     [07-21-22 @ 06:20]    Creatinine Trend:  SCr 1.37 [07-21 @ 06:20]  SCr 1.50 [07-20 @ 05:56]  SCr 1.41 [07-19 @ 06:03]  SCr 1.34 [07-18 @ 06:09]  SCr 1.26 [07-17 @ 06:25]    Urinalysis - [07-18-22 @ 10:17]      Color Light Yellow / Appearance Clear / SG 1.018 / pH 6.0      Gluc Negative / Ketone Negative  / Bili Negative / Urobili Negative       Blood Negative / Protein 30 mg/dL / Leuk Est Negative / Nitrite Negative      RBC 3 / WBC 2 / Hyaline 0 / Gran  / Sq Epi  / Non Sq Epi 1 / Bacteria Negative    Urine Creatinine 72      [07-18-22 @ 10:18]  Urine Protein 58      [07-18-22 @ 10:18]  Urine Sodium 40      [07-18-22 @ 10:18]  Urine Urea Nitrogen 585      [07-18-22 @ 10:18]  Urine Potassium 22      [07-18-22 @ 10:18]  Urine Osmolality 370      [07-18-22 @ 10:18]    Syphilis Screen (Treponema Pallidum Ab) Negative      [06-27-22 @ 02:00]    CMV PCRCMVPCR Log: NotDetec Vxm11QJ/mL (06-26 @ 18:14)  CMVPCR Log: NotDetec Sdr55NC/mL (06-25 @ 14:08)    Parvo PCRParvovirus B19 DNA by PCR: NotDetec IU/mL (06-26 @ 18:14)

## 2022-07-21 NOTE — PROGRESS NOTE ADULT - SUBJECTIVE AND OBJECTIVE BOX
Transplant Surgery Multidisciplinary Progress Note   --------------------------------------------------------------  R DCD DDRT    4/21/22    Present: Patient seen and examined with multidisciplinary team including Transplant Surgeon: Dr. Davis, Nephrologist: Dr. Waqar Lomeli, nephrology fellow, WALDO Esparza and unit RN during am rounds.  Disciplines not in attendance will be notified of the plan.     HPI: 66M with PMH: DM type II, HTN, CAD s/p CABG in 2020, AFib on Eliquis, CVA in 2019 due to Afib, Seizure d/o following CVA, last episode was on 4/8/22, h/o GIB in 2/2022 EGD with duodenal ulcer.  ESRD due to DM was on HD since 2019.     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  Donor was 58 , KDPI 82%, DCD, single vessels and ureter, HLA mismatch 1, 2, 2. No DSA, cPRA 0%. 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 Re-admitted in May for anemia in the setting of large perinephric hematoma s/p evacuation and repair of arterial anastomosis on 5/2/22. Intra operative biopsy showed no rejection, Creatinine ranging ~2mg/dL.    In Rehab:  He was recently dx with klebsiella UTI rx with ertapenem - then switched to Levaquin day #6.    He also had parainfluenza pneumonia. Was at rehab progressing well.      Hospital course:  - 6/10: transferred from rehab facility for seizure status epilepticus. Intubated for respiratory protection and transferred to MICU.  EEG showed no seizure activity. Neuro consulted.   - 6/11: Extubated. Found to have R pleural effusion. s/p Thoracentesis 1.3L. Eliquis changed to heparin drip.  Post procedure drop in H&H. 5u PRBC, 2 u FFP.   - 6/11 evening: Transferred to SICU from MICU for further care in setting of hypoxia, significant R pleural effusion, anemia and hemodynamic instability  - 6/11 Intubated at 9pm and sedated on Precedex.  CT placed, 600ml blood drained.  1L total overnight, started pressors  - 6/11 Received Protamine for PTT >100 (was on Heparin drip started for AF), 2 u FFP and 5u PRBC total  - 6/13 s/p VATS 2.2L old blood removed; second chest tube placed  - 6/18-19: chest tubes removed  - 6/21: ?PRES vs. chronic ischemic changes on MRI, off Envarsus, switched to Belatacept 6/23 with good graft function  - 6/25: Tx to SICU for refractory seizures, improved with IV antiepileptic regimen  - 7/10: Passed bedside S&S in SICU. Downgraded from SICU to floor.   - 7/11: OR-->URETERAL STENT REMOVED  - 7/15: ESBL Kleb UTI (50-90K), completed Ertapenem x 3 Days    Interval Events:  - No acute overnight events  - started Coreg 6.25mg bid  - received NS 500cc bolus for SCr 1.5   - H/H trending down 7.7/24 (from 8.7/26)  - DSA neg from 7/19    Immunosuppression:   Induction:  Thymoglobulin                                        Maintenance:  - Belatacept: 6/23, 7/4, 7/8, 7/18. Next dose 8/1.  BID (leukopenia), Pred 5mg daily   - Ongoing monitoring for signs of rejection.    Potential Discharge date: pending clinical improvement   Education:  Medications  Plan of care:  See Below    MEDICATIONS  (STANDING):  atorvastatin 40 milliGRAM(s) Oral at bedtime  carvedilol 12.5 milliGRAM(s) Oral every 12 hours  carvedilol 6.25 milliGRAM(s) Oral once  chlorhexidine 2% Cloths 1 Application(s) Topical <User Schedule>  doxazosin 4 milliGRAM(s) Oral <User Schedule>  enoxaparin Injectable 60 milliGRAM(s) SubCutaneous every 12 hours  epoetin cortney-epbx (RETACRIT) Injectable 77292 Unit(s) SubCutaneous every 7 days  finasteride 5 milliGRAM(s) Oral daily  hydrALAZINE 100 milliGRAM(s) Oral every 8 hours  insulin glargine Injectable (LANTUS) 5 Unit(s) SubCutaneous at bedtime  insulin lispro (ADMELOG) corrective regimen sliding scale   SubCutaneous three times a day with meals  insulin lispro (ADMELOG) corrective regimen sliding scale   SubCutaneous at bedtime  insulin lispro Injectable (ADMELOG) 5 Unit(s) SubCutaneous three times a day before meals  levothyroxine 50 MICROGram(s) Oral daily  melatonin 6 milliGRAM(s) Oral at bedtime  mycophenolate mofetil Suspension 500 milliGRAM(s) Oral <User Schedule>  NIFEdipine XL 60 milliGRAM(s) Oral two times a day  nystatin    Suspension 453101 Unit(s) Oral four times a day  pantoprazole    Tablet 40 milliGRAM(s) Oral daily  phenytoin   Capsule 100 milliGRAM(s) Oral every 8 hours  polyethylene glycol 3350 17 Gram(s) Oral daily  predniSONE  Solution 5 milliGRAM(s) Oral daily  trimethoprim   80 mG/sulfamethoxazole 400 mG 1 Tablet(s) Oral daily    MEDICATIONS  (PRN):  acetaminophen     Tablet .. 650 milliGRAM(s) Oral every 6 hours PRN Mild Pain (1 - 3)      PAST MEDICAL & SURGICAL HISTORY:  Hypertension  Diabetes  Dyslipidemia  CAD (Coronary Artery Disease) with Stents in 06/2009 and 6/2019, s/p off-pump C3L on 8/13/20  Hypothyroidism  CVA (cerebral vascular accident) 12/13/19 with residual bilateral weakness  Anemia  PEG (percutaneous endoscopic gastrostomy) status  removed July 2020  Intubation of airway performed without difficulty  Dec 2019  CVA c/b status epilepticus requiring intubation and PEG in 12/2019-  ESRD on dialysis  M/W/F  Seizures  after CVA, last seizure was last week 4/07/22  History of insertion of stent into coronary artery bypass graft  2009 and 2019  S/P CABG x 3  off pump C3L on 8/13/20    Vital Signs Last 24 Hrs  T(C): 36.4 (21 Jul 2022 08:50), Max: 37.2 (20 Jul 2022 20:40)  T(F): 97.6 (21 Jul 2022 08:50), Max: 99 (20 Jul 2022 20:40)  HR: 68 (21 Jul 2022 08:50) (68 - 80)  BP: 153/61 (21 Jul 2022 08:50) (146/69 - 169/80)  BP(mean): 107 (21 Jul 2022 05:32) (101 - 107)  RR: 20 (21 Jul 2022 08:50) (18 - 20)  SpO2: 100% (21 Jul 2022 08:50) (97% - 100%)    I&O's Summary    20 Jul 2022 07:01  -  21 Jul 2022 07:00  --------------------------------------------------------  IN: 1340 mL / OUT: 1420 mL / NET: -80 mL    21 Jul 2022 07:01  -  21 Jul 2022 12:05  --------------------------------------------------------  IN: 340 mL / OUT: 350 mL / NET: -10 mL                           7.7    2.42  )-----------( 342      ( 21 Jul 2022 06:20 )             24.0     07-21    132<L>  |  101  |  47<H>  ----------------------------<  114<H>  4.2   |  21<L>  |  1.37<H>    Ca    8.4      21 Jul 2022 06:20  Phos  3.3     07-21  Mg     1.8     07-21    TPro  6.0  /  Alb  2.5<L>  /  TBili  0.2  /  DBili  x   /  AST  16  /  ALT  11  /  AlkPhos  104  07-21    Culture - Blood (collected 07-15-22 @ 08:36)  Source: .Blood Blood  Final Report (07-20-22 @ 12:01):    No Growth Final    Culture - Blood (collected 07-15-22 @ 07:15)  Source: .Blood Blood  Final Report (07-20-22 @ 18:00):    No Growth Final      REVIEW OF SYSTEMS  ------------------------------------  Gen: +fatigue, No fevers/chills, +weakness  Skin: No rashes  Head/Eyes/Ears/Mouth: No headache; Normal hearing; Normal vision w/o blurriness; No sinus pain/discomfort, sore throat  Respiratory: No dyspnea, cough, wheezing, hemoptysis  CV: No chest pain, PND, orthopnea  GI: No abdominal pain, diarrhea, constipation, nausea, vomiting, melena, hematochezia  : No increased frequency, dysuria, hematuria, nocturia  MSK: No joint pain/swelling; no back pain; no edema  Neuro: No dizziness/lightheadedness,  seizures, numbness, tingling, +weakness  Heme: No easy bruising or bleeding  Endo: No heat/cold intolerance  Psych: No significant nervousness, anxiety, stress, depression  All other systems were reviewed and are negative, except as noted.    PHYSICAL EXAM:  Constitutional: Well developed / well nourished  Eyes: Anicteric, PERRLA  ENMT: nc/at, +oral thrush improving  Neck: supple  Respiratory: CTA   Cardiovascular: RRR  Gastrointestinal: Soft, non distended, NT, RLQ incision healed   Genitourinary: voiding spontaneously  Extremities: SCD's in place and working bilaterally, overall edema improving  Vascular: Palpable dp pulses bilaterally  Neurological: AAOx3  Skin: no rashes, ulcerations or lesions  Musculoskeletal: Moving all extremities, global weakness  Psychiatric: calm, follows commands and interacts appropriately

## 2022-07-21 NOTE — PROGRESS NOTE ADULT - SUBJECTIVE AND OBJECTIVE BOX
DIABETES FOLLOW UP : Seen earlier today    INTERVAL HX: breakfast tray observed had 1/2 of mighty shake & 1 nepro , received full dose of admelog this am , encouraged pt to drink 2nd nepro on tray and helped him peel his eggs . d/w RN would ensure pt tolerating >50% of meal prior to admelog administration  to prevent hypoglycemia       Review of Systems:  General: As above  Cardiovascular: No chest pain  Respiratory: No SOB  GI: No nausea, vomiting  Endocrine: no  S&Sx of hypoglycemia    Allergies    No Known Allergies    Intolerances      MEDICATIONS  (STANDING):  atorvastatin 40 milliGRAM(s) Oral at bedtime  carvedilol 12.5 milliGRAM(s) Oral every 12 hours  chlorhexidine 2% Cloths 1 Application(s) Topical <User Schedule>  doxazosin 4 milliGRAM(s) Oral <User Schedule>  enoxaparin Injectable 60 milliGRAM(s) SubCutaneous every 12 hours  epoetin cortney-epbx (RETACRIT) Injectable 24323 Unit(s) SubCutaneous every 7 days  finasteride 5 milliGRAM(s) Oral daily  hydrALAZINE 100 milliGRAM(s) Oral every 8 hours  insulin glargine Injectable (LANTUS) 5 Unit(s) SubCutaneous at bedtime  insulin lispro (ADMELOG) corrective regimen sliding scale   SubCutaneous three times a day with meals  insulin lispro (ADMELOG) corrective regimen sliding scale   SubCutaneous at bedtime  insulin lispro Injectable (ADMELOG) 5 Unit(s) SubCutaneous three times a day before meals  levothyroxine 50 MICROGram(s) Oral daily  melatonin 6 milliGRAM(s) Oral at bedtime  mycophenolate mofetil Suspension 500 milliGRAM(s) Oral <User Schedule>  NIFEdipine XL 60 milliGRAM(s) Oral two times a day  nystatin    Suspension 046168 Unit(s) Oral four times a day  pantoprazole    Tablet 40 milliGRAM(s) Oral daily  phenytoin   Capsule 100 milliGRAM(s) Oral every 8 hours  polyethylene glycol 3350 17 Gram(s) Oral daily  predniSONE  Solution 5 milliGRAM(s) Oral daily  trimethoprim   80 mG/sulfamethoxazole 400 mG 1 Tablet(s) Oral daily        atorvastatin   40 milliGRAM(s) Oral (07-20-22 @ 22:10)    finasteride   5 milliGRAM(s) Oral (07-20-22 @ 13:35)    insulin glargine Injectable (LANTUS)   5 Unit(s) SubCutaneous (07-20-22 @ 22:08)    insulin lispro (ADMELOG) corrective regimen sliding scale   3 Unit(s) SubCutaneous (07-20-22 @ 17:24)   4 Unit(s) SubCutaneous (07-20-22 @ 13:36)    insulin lispro Injectable (ADMELOG)   5 Unit(s) SubCutaneous (07-21-22 @ 08:51)   5 Unit(s) SubCutaneous (07-20-22 @ 17:25)    insulin lispro Injectable (ADMELOG)   3 Unit(s) SubCutaneous (07-20-22 @ 13:35)    levothyroxine   50 MICROGram(s) Oral (07-21-22 @ 05:25)    predniSONE  Solution   5 milliGRAM(s) Oral (07-21-22 @ 06:11)        PHYSICAL EXAM:  VITALS: T(C): 36.4 (07-21-22 @ 08:50)  T(F): 97.6 (07-21-22 @ 08:50), Max: 99 (07-20-22 @ 20:40)  HR: 68 (07-21-22 @ 08:50) (68 - 80)  BP: 153/61 (07-21-22 @ 08:50) (146/69 - 169/80)  RR:  (18 - 20)  SpO2:  (97% - 100%)  Wt(kg): --  GENERAL: male laying in bed  in NAD  Respiratory: Respirations unlabored   Extremities: Warm, no edema  NEURO: Alert , appropriate       LABS:    POCT Blood Glucose.: 131 mg/dL (07-21-22 @ 08:35)  POCT Blood Glucose.: 113 mg/dL (07-20-22 @ 21:51)  POCT Blood Glucose.: 272 mg/dL (07-20-22 @ 17:21)  POCT Blood Glucose.: 319 mg/dL (07-20-22 @ 13:32)  POCT Blood Glucose.: 253 mg/dL (07-20-22 @ 12:24)  POCT Blood Glucose.: 153 mg/dL (07-20-22 @ 08:25)  POCT Blood Glucose.: 117 mg/dL (07-19-22 @ 21:14)  POCT Blood Glucose.: 144 mg/dL (07-19-22 @ 18:50)  POCT Blood Glucose.: 91 mg/dL (07-19-22 @ 17:10)  POCT Blood Glucose.: 164 mg/dL (07-19-22 @ 14:27)  POCT Blood Glucose.: 135 mg/dL (07-19-22 @ 12:43)  POCT Blood Glucose.: 112 mg/dL (07-19-22 @ 09:07)  POCT Blood Glucose.: 113 mg/dL (07-18-22 @ 21:06)  POCT Blood Glucose.: 114 mg/dL (07-18-22 @ 17:17)                            7.7    2.42  )-----------( 342      ( 21 Jul 2022 06:20 )             24.0       07-21    132<L>  |  101  |  47<H>  ----------------------------<  114<H>  4.2   |  21<L>  |  1.37<H>    Ca    8.4      21 Jul 2022 06:20  Phos  3.3     07-21  Mg     1.8     07-21    TPro  6.0  /  Alb  2.5<L>  /  TBili  0.2  /  DBili  x   /  AST  16  /  ALT  11  /  AlkPhos  104  07-21      eGFR: 57 mL/min/1.73m2 (21 Jul 2022 06:20)          Thyroid Function Tests:  07-06 @ 18:36 TSH 4.69 FreeT4 -- T3 43 Anti TPO -- Anti Thyroglobulin Ab -- TSI --          A1C with Estimated Average Glucose Result: 6.0 % (06-30-22 @ 05:49)  A1C with Estimated Average Glucose Result: 6.6 % (04-24-22 @ 17:12)      Estimated Average Glucose: 126 mg/dL (06-30-22 @ 05:49)  Estimated Average Glucose: 143 mg/dL (04-24-22 @ 17:12)

## 2022-07-21 NOTE — PROGRESS NOTE ADULT - ATTENDING COMMENTS
Pt feeling better slowly.  Creatinine slightly improved but Hb dropping and BUN increasing - send stool for occult blood.   Pt without diarrhea or epigastric pain but is on anticoagulation.   Dilantin level low - f/u dosing with neurology.   Cont belatacept, MMF 500mgs BID and prednisone.  Last dose belatacept on 7/18

## 2022-07-21 NOTE — PROGRESS NOTE ADULT - ASSESSMENT
66 yr old Male with PMHx ESRD s/p permacath removal 5/10/22 s/p Rt DDRT 4/21/22,  CVA (2019), Afib on apixaban, seizure activity, CAD s/p stents, CABG (2020), HTN, HLD, DM on insulin, gastric/duodenal ulcer, recent hospitalization 4/28/22 -5/10/22 with weakness/anemia found to have perinephric hematoma requiring evacuation and repair of bleeding arterial anastomosis. Curently being treated for UTI with Levaquin Today after developing multiple sz episodes refractory to 5 mg versed IM (EMS) and 2 mg ativan IV in E.D. concerning for status epilepticus requiring intubation for hypoxic respiratory  failure. MICU Consult was called for hypoxic respiratory failure secondary to status epilepticus.  Nephrology consulted for renal failure.     A/P  DDRT on 04/21/22  Course complicated by DGF, was on HD until 4/29/22. Readmitted in May for anemia in the setting of large perinephric hematoma s/p evacuation and repair of arterial anastomosis on 5/2/22. Intra operative biopsy showed no rejection.  ANA was initially sec to  hemodynamic change   stent removal 07/12/22  scr is better today   allograft US, DSA negative   monitor BMP, U/O     HYponatremia   avoid hypotonic solution   monitor BMP closely     History of Hyperkalemia  Secondary to Bactrim now resolved off Bactrim.    HTN   acceptable    manage by transplant team    monitor BP closely     UTI  Urine culture with ESBL E.coli.  cefepime changed to ertapenem.     Immunosuppression per transplant team  Induction:  Thymoglobulin           Envarsus discontinued for possible neurotoxicity. Continue     Belatacept, next dose on 7/18      MMF 500mg po BID decreased because of cytopenia and UTI      Prednisone 5mg po daily       For PCP prophylaxis,   Transplant team changed atovaquone to bactrim as hyperkalemia has resolved,     CMV prophylaxis.   continue Valcyte 450mg po daily (CMV intermediate risk)

## 2022-07-21 NOTE — CONSULT NOTE ADULT - ASSESSMENT
Impression:    Sacral/bilateral buttocks Incontinence Associated Dermatitis  Incontinence of bowel    Recommend:  1.) topical therapy: sacral/buttock injury - cleanse with incontinence cleanser, pat dry, apply allevyn foam dressing every other day  2.) Incontinence Management - incontinence cleanser, pads, pericare BID  3.) Maintain on an alternating air with low air loss surface  4.) Turn and reposition Q 2 hours  5.) Nutrition optimization  6.) Offload heels/feet with complete cair air fluidized boots; ensure that the soles of the feet are not resting on the foot board of the bed.  7.) Chair cushion for chair sitting    Care as per medicine. Will not actively follow but will remain available. Please recall for new issues or deterioration.  Upon discharge [facility] [address] [place] [address] [phone]  Discussed with clinical nurse  Thank you for this consult  Chana Carrillo, NP-C, CWOCN [id number]

## 2022-07-21 NOTE — CONSULT NOTE ADULT - SUBJECTIVE AND OBJECTIVE BOX
Wound Surgery Consult Note:    HPI:  66 yr old Male with PMHx ESRD s/p permacath removal 5/10/22 s/p Rt DDRT 4/21/22,  CVA (2019), Afib on apixaban, seizure activity, CAD s/p stents, CABG (2020), HTN, HLD, DM on insulin, gastric/duodenal ulcer, recent hospitalization 4/28/22 -5/10/22 with weakness/anemia found to have perinephric hematoma requiring evacuation and repair of bleeding arterial anastomosis. Curently being treated for UTI with Levaquin Today after developing multiple sz episodes refractory to 5 mg versed IM (EMS) and 2 mg ativan IV in E.D. concerning for status epilepticus requiring intubation for hypoxic respiratory  failure. MICU Consult was called for hypoxic respiratory failure secondary to status epilepticus.  (10 Raffi 2022 19:11)    Request for wound care consult for sacral/bilateral buttocks skin breakdown received from nursing. Mr. Castelan was encountered on an alternating air with low air loss surface. He is incontinent of stool.  His extreme immobility, inactivity, gross incontinence of stool as well as poor nutritional status all contribute to her high risk for pressure injury development and hinder healing    PAST MEDICAL & SURGICAL HISTORY:  Hypertension  Diabetes  Dyslipidemia  CAD (Coronary Artery Disease), with Stents in 06/2009 and 6/2019, s/p off-pump C3L on 8/13/20  Hypothyroidism  CVA (cerebral vascular accident), 12/13/19 with residual bilateral weakness  Anemia  PEG (percutaneous endoscopic gastrostomy) status, removed July 2020  Intubation of airway performed without difficulty, Dec 2019  CVA c/b status epilepticus requiring intubation and PEG in 12/2019-  ESRD on dialysis, M/W/F  Seizures, after CVA, last seizure was last week 4/07/22  History of insertion of stent into coronary artery bypass graft, 2009 and 2019  S/P CABG x 3, off pump C3L on 8/13/20    MEDICATIONS  (STANDING):  atorvastatin 40 milliGRAM(s) Oral at bedtime  carvedilol 6.25 milliGRAM(s) Oral every 12 hours  chlorhexidine 2% Cloths 1 Application(s) Topical <User Schedule>  doxazosin 4 milliGRAM(s) Oral <User Schedule>  enoxaparin Injectable 60 milliGRAM(s) SubCutaneous every 12 hours  epoetin cortney-epbx (RETACRIT) Injectable 15416 Unit(s) SubCutaneous every 7 days  finasteride 5 milliGRAM(s) Oral daily  hydrALAZINE 100 milliGRAM(s) Oral every 8 hours  insulin glargine Injectable (LANTUS) 5 Unit(s) SubCutaneous at bedtime  insulin lispro (ADMELOG) corrective regimen sliding scale   SubCutaneous three times a day with meals  insulin lispro (ADMELOG) corrective regimen sliding scale   SubCutaneous at bedtime  insulin lispro Injectable (ADMELOG) 5 Unit(s) SubCutaneous three times a day before meals  levothyroxine 50 MICROGram(s) Oral daily  melatonin 6 milliGRAM(s) Oral at bedtime  mycophenolate mofetil Suspension 500 milliGRAM(s) Oral <User Schedule>  NIFEdipine XL 60 milliGRAM(s) Oral two times a day  nystatin    Suspension 217300 Unit(s) Oral four times a day  pantoprazole    Tablet 40 milliGRAM(s) Oral daily  phenytoin   Capsule 100 milliGRAM(s) Oral every 8 hours  polyethylene glycol 3350 17 Gram(s) Oral daily  predniSONE  Solution 5 milliGRAM(s) Oral daily  trimethoprim   80 mG/sulfamethoxazole 400 mG 1 Tablet(s) Oral daily    MEDICATIONS  (PRN):  acetaminophen     Tablet .. 650 milliGRAM(s) Oral every 6 hours PRN Mild Pain (1 - 3)    Allergies    No Known Allergies    Intolerances    Vital Signs Last 24 Hrs  T(C): 36.4 (21 Jul 2022 08:50), Max: 37.2 (20 Jul 2022 20:40)  T(F): 97.6 (21 Jul 2022 08:50), Max: 99 (20 Jul 2022 20:40)  HR: 68 (21 Jul 2022 08:50) (68 - 80)  BP: 153/61 (21 Jul 2022 08:50) (146/69 - 169/80)  BP(mean): 107 (21 Jul 2022 05:32) (101 - 107)  RR: 20 (21 Jul 2022 08:50) (18 - 20)  SpO2: 100% (21 Jul 2022 08:50) (97% - 100%)    Parameters below as of 21 Jul 2022 08:50  Patient On (Oxygen Delivery Method): room air    Physical Exam:  General: Alert, thin  Respiratory: No SOB on room air  Gastrointestinal: soft NT/ND  Neurology: verbal, following commands  Musculoskeletal: no contractures  Vascular: BLE edema equal  Skin:  Sacral/bilateral buttocks with small area of superficially denuded skin  L 2cm X W 1cm x D 0.1cm with pink wound bed, no necrotic tissue, and scant serosanguinous drainage, periwound hyperpigmented with area of hypopigmentation  No odor, erythema, increased warmth, tenderness, induration, fluctuance    LABS:  07-21    132<L>  |  101  |  47<H>  ----------------------------<  114<H>  4.2   |  21<L>  |  1.37<H>    Ca    8.4      21 Jul 2022 06:20  Phos  3.3     07-21  Mg     1.8     07-21    TPro  6.0  /  Alb  2.5<L>  /  TBili  0.2  /  DBili  x   /  AST  16  /  ALT  11  /  AlkPhos  104  07-21                          7.7    2.42  )-----------( 342      ( 21 Jul 2022 06:20 )             24.0     PT/INR - ( 21 Jul 2022 06:20 )   PT: 13.4 sec;   INR: 1.16 ratio         PTT - ( 21 Jul 2022 06:20 )  PTT:35.3 sec

## 2022-07-21 NOTE — PROGRESS NOTE ADULT - ASSESSMENT
65yo man with PMH of CVA (2019) with subsequent seizure disorder, ESRD s/p renal transplant (04/2022), afib (on apixaban), CAD (s/p stents), CABG (2020), HTN, HLD, DM presents to the ED with status epilepticus from [facility]. Semiology includes eyes deviated to the R, rhythmic R facial twitching as well as R shoulder clonic movements lasting approximately 30 seconds each. EEG from 04/2022 showed L frontocentral focal onset seizures. S/p intubation on 6/11. EEG negative for seizure but showed structural or functional abnormality in the left fronto-temporal region and moderate to severe nonspecific diffuse or multifocal cerebral dysfunction. RRT called 6/21 for decreased movement of RUE but with increased tone and tremor like activity/ clonus and diffuse increased tone and increased encephalopathy concerning for seizure. Course then complicated by EEG 6/24 showing L frontotemporal/ frontal seizures. Was on keppra, valproic acid, vimpat but had seizures through the prior two medications. Stopped vimpat, valproic acid, standing ativan, and fycompa. Now only on phenytoin.    Last EEG 7/7:  Infrequent left posterior sharp discharges, Left frontotemporal slowing    Impression: History of recent status epilepticus that was refractory, now on phenytoin. Mental status currently greatly improved possibly from hospital-induced delirium with toxic-metabolic encephalopathy.     Recommendations:  [] Continue phenytoin 100mg q8h  [] no need to check phenytoin levels any further  [] monitor clinically for seizures and only if patient has a seizure may check phenytoin level  [] From stroke standpoint,continue anticoagulation for secondary stroke prevention given afib hx if not otherwise contraindicated (given potential interaction with eliquis and phenytoin, agree with either lovenox or coumadin for anticoagulation).  [x] CSF without evidence of infection, inflammation  [x] Vitamin B12 1157, folate >20, homocysteine 13.4, Vitamin B1 160  [x] TSH 4.6, T3 3.9/T4 43 - correction of hypothyroidism per primary team and endocrinology  [x] BP goals of normotension   [x] MR brain without clear evidence of infection, inflammation, or acute CNS event (possibly minimal enhancement). Although prior study read as possible PRES, most recent MRI seems more consistent with significant chronic ischemic microvascular disease given no associated DWI changes or obvious enhancement  [x] Telemetry monitoring  [x] Neuro checks and vital signs per unit protocol  [x] Seizure/fall/aspiration precautions  [] please have adequate sleeping environment for the patient, light on, curtains open, TV on during the day with regular stimulation and out of bed to chair if possible. During the night, close curtains, TV off, lights off, and minimize interruptions (limit blood draws and vital sign checks if possible). Regular interaction with patient with staff (introducing selves), frequent reorientation, and encouragement of visitors as allowed by hospital and unit policy. Regular toileting.  [] if needed for sleep may administer melatonin 3mg  [] limitation of sedating medications as tolerated  [] maintain electrolytes within normal limits  [/] Advise against driving, operating heavy machinery, water sports/bathing, activity in elevation without appropriate safety precautions  [ ] outpatient EMG/ NCS    No further inpatient neurological workup.  Neurology signing off. Please reconsult PRN or call tagga [phone] with any questions.    Patient seen and discussed with neurology attending, Dr. Maria.

## 2022-07-21 NOTE — PROGRESS NOTE ADULT - NUTRITIONAL ASSESSMENT
This patient has been assessed with a concern for Malnutrition and has been determined to have a diagnosis/diagnoses of Severe protein-calorie malnutrition.    This patient is being managed with:   Diet Consistent Carbohydrate w/Evening Snack-  Supplement Feeding Modality:  Oral  Nepro Cans or Servings Per Day:  2       Frequency:  Daily  Entered: Jul 20 2022  2:44PM

## 2022-07-21 NOTE — PROGRESS NOTE ADULT - SUBJECTIVE AND OBJECTIVE BOX
[facility] [place] [facility]   MD NINA MASON MD, PA KRISTINE SOLTANPOUR, DO INJUNG KO, NP    TEL:  OFFICE: [phone]    From 5pm-7am Answering Service [phone]    -- RENAL FOLLOW UP NOTE ---Date of Service 07-21-22 @ 12:22    Patient is a 66y old  Male who presents with a chief complaint of Status Epilepticus (21 Jul 2022 12:00)      Patient seen and examined at bedside. No chest pain/sob    VITALS:  T(F): 97.6 (07-21-22 @ 08:50), Max: 99 (07-20-22 @ 20:40)  HR: 68 (07-21-22 @ 08:50)  BP: 153/61 (07-21-22 @ 08:50)  RR: 20 (07-21-22 @ 08:50)  SpO2: 100% (07-21-22 @ 08:50)  Wt(kg): --    07-20 @ 07:01  -  07-21 @ 07:00  --------------------------------------------------------  IN: 1340 mL / OUT: 1420 mL / NET: -80 mL    07-21 @ 07:01  -  07-21 @ 12:22  --------------------------------------------------------  IN: 340 mL / OUT: 350 mL / NET: -10 mL          PHYSICAL EXAM:  Constitutional: NAD  Neck: No JVD  Respiratory: CTAB, no wheezes, rales or rhonchi  Cardiovascular: S1, S2, RRR  Gastrointestinal: BS+, soft, NT/ND  Extremities: No peripheral edema    Hospital Medications:   MEDICATIONS  (STANDING):  atorvastatin 40 milliGRAM(s) Oral at bedtime  carvedilol 12.5 milliGRAM(s) Oral every 12 hours  carvedilol 6.25 milliGRAM(s) Oral once  chlorhexidine 2% Cloths 1 Application(s) Topical <User Schedule>  doxazosin 4 milliGRAM(s) Oral <User Schedule>  enoxaparin Injectable 60 milliGRAM(s) SubCutaneous every 12 hours  epoetin cortney-epbx (RETACRIT) Injectable 46431 Unit(s) SubCutaneous every 7 days  finasteride 5 milliGRAM(s) Oral daily  hydrALAZINE 100 milliGRAM(s) Oral every 8 hours  insulin glargine Injectable (LANTUS) 5 Unit(s) SubCutaneous at bedtime  insulin lispro (ADMELOG) corrective regimen sliding scale   SubCutaneous three times a day with meals  insulin lispro (ADMELOG) corrective regimen sliding scale   SubCutaneous at bedtime  insulin lispro Injectable (ADMELOG) 5 Unit(s) SubCutaneous three times a day before meals  levothyroxine 50 MICROGram(s) Oral daily  melatonin 6 milliGRAM(s) Oral at bedtime  mycophenolate mofetil Suspension 500 milliGRAM(s) Oral <User Schedule>  NIFEdipine XL 60 milliGRAM(s) Oral two times a day  nystatin    Suspension 343369 Unit(s) Oral four times a day  pantoprazole    Tablet 40 milliGRAM(s) Oral daily  phenytoin   Capsule 100 milliGRAM(s) Oral every 8 hours  polyethylene glycol 3350 17 Gram(s) Oral daily  predniSONE  Solution 5 milliGRAM(s) Oral daily  trimethoprim   80 mG/sulfamethoxazole 400 mG 1 Tablet(s) Oral daily      LABS:  07-21    132<L>  |  101  |  47<H>  ----------------------------<  114<H>  4.2   |  21<L>  |  1.37<H>    Ca    8.4      21 Jul 2022 06:20  Phos  3.3     07-21  Mg     1.8     07-21    TPro  6.0  /  Alb  2.5<L>  /  TBili  0.2  /  DBili      /  AST  16  /  ALT  11  /  AlkPhos  104  07-21    Creatinine Trend: 1.37 <--, 1.50 <--, 1.41 <--, 1.34 <--, 1.26 <--, 1.21 <--, 1.18 <--    Albumin, Serum: 2.5 g/dL (07-21 @ 06:20)  Phosphorus Level, Serum: 3.3 mg/dL (07-21 @ 06:20)                              7.7    2.42  )-----------( 342      ( 21 Jul 2022 06:20 )             24.0     Urine Studies:  Urinalysis - [07-18-22 @ 10:17]      Color Light Yellow / Appearance Clear / SG 1.018 / pH 6.0      Gluc Negative / Ketone Negative  / Bili Negative / Urobili Negative       Blood Negative / Protein 30 mg/dL / Leuk Est Negative / Nitrite Negative      RBC 3 / WBC 2 / Hyaline 0 / Gran  / Sq Epi  / Non Sq Epi 1 / Bacteria Negative    Urine Creatinine 72      [07-18-22 @ 10:18]  Urine Protein 58      [07-18-22 @ 10:18]  Urine Sodium 40      [07-18-22 @ 10:18]  Urine Urea Nitrogen 585      [07-18-22 @ 10:18]  Urine Potassium 22      [07-18-22 @ 10:18]  Urine Osmolality 370      [07-18-22 @ 10:18]    Iron 17, TIBC 171, %sat 10      [05-02-22 @ 13:03]  Ferritin 1505      [05-02-22 @ 13:05]  PTH -- (Ca 9.2)      [02-05-22 @ 10:13]   294  HbA1c 6.0      [02-21-20 @ 08:53]  TSH 4.69      [07-06-22 @ 18:36]      Syphilis Screen (Treponema Pallidum Ab) Negative      [06-27-22 @ 02:00]    RADIOLOGY & ADDITIONAL STUDIES:

## 2022-07-21 NOTE — PROGRESS NOTE ADULT - NS ATTEND AMEND GEN_ALL_CORE FT
serum creatinine down today to 1.3  immunosuppression jitendra, mmf and steroids  US kidney normal  neurology follow up for seizures   will monitor for one more day; plan discharge to rehab

## 2022-07-21 NOTE — PROGRESS NOTE ADULT - ASSESSMENT
66 yr old man with h/o ESRD due to DM on HD since 2019, s/p DDRT from [facility] donor on 4/21/22 complicated by DGF requiring HD until 4/29/22 now admitted with seizures/status epilepticus    1. S/p DCD DDRT on 4/21/22  - creatinine slowly uptrending to 1.5. Electrolytes and volume status fair. Transplant ureteric stent removed 7/12. Renal sonogram done on 7/19 showed no hydronephrosis. MARA was not detected on US. DSA sent on 7/19/22 were negative. Pt received IVF (500 cc NS) on 7/20/22. SCr improved to 1.37, BUN disproportionately elevated at 47 today. Pt nonoliguric with UOP of 1.4L as documented. Monitor labs and urine output.     2. Immunosuppression - Envarsus discontinued for possible neurotoxicity. Continue Belatacept, last dose on 7/18. Next dose on 8/1/22.     Continue Cellcept to 500mg po bid - dose reduced on 7/16 for cytopenia and UTI      Continue Prednisone 5mg po daily      Infection prophylaxis - continue bactrim ss daily    Hold valcyte 450mg daily (CMV intermediate risk) for now given leucopenia.     3. Seizure d/o -admitted with status epilepticus .Corrected phenytoin level 3.7. As per neurology to continue current dose of phenytoin.  Dose being adjusted as per levels. Neurology following.      4. HTN:  BP uncontrolled. On Hydralazine 100mg q8, Nifedipine 60mg po bid, Doxazosin 4mg PO BID. Add Coreg 12.5 mg BID. Monitor BP.     5. BPH with urine retention - continue Proscar and doxazosin. Recent bladder scan shows minimal PVR. Monitor UOP.    6. Cytopenia -  Continue epo 10,000 weekly for anemia, continue MMF to 500mg po bid. Hold valcyte for now given downtrending WBC.     7. Atrial fibrillation - was on Eliquis, now on therapeutic Lovenox due to drug interaction with fosphenytoin. Family comfortable with continuing with Lovenox for now rather than coumadin.     8. ESBL Klebsiella UTI - Completed ertapenem x 3 days total per transplant ID.    9. Hyponatremia: SNa slowly downtrending to 129 on 7/18. Urine studies reviewed. SNa stable at 132 today. Monitor SNa. Avoid hypotonic fluids.     10. Anemia: Pt with downtrending Hb 8.7>7.7. Pt on lovenox. Check stool for occult blood. Continue epogen. Monitor H/H.

## 2022-07-21 NOTE — PROGRESS NOTE ADULT - ATTENDING COMMENTS
Interval History as per resident note, personally verified by me. Doing better, patient without complaints and resting comfortably in chair. Son, at bedside, says this is the most lucid patient has been and is awaiting placement in [facility] acute inpatient rehab.    Focused neurologic exam:  MS Ricarda RASMUSSEN x 2.5 (knew 7/2022 but not rest of time), speech fluent, follows simple commands. Rep/naming, attn/conc, recent and remote memory, fund of knowledge dec  CN - PERRL, EOMI, VFF, face sens/str intact b/l, hearing intact to conversation b/l, SCM at least 4+/5 b/l and symmetric, tongue/palate midline  Motor - Normal bulk. Inc tone in L side (paratonic?) and normal tone in R side. R side 4-4+/5, L side 4+/5  Sens - LT/temp intact all, as above  DTR's - 2+ BUE's, 3+ R KJ, 2+ L KJ, 1+ AJ b/l, and neutral R and downgoing L plantar response  Coord - Grossly appropriate for level of strength  Gait and station - Due to fall risk/safety concerns did not assess    A/P:  Epilepsy, intractable, with resolved status epilepticus  Encephalopathy  S/p renal transplant, ANA  Prior strokes  Atrial fibrillation  CAD  HTN  DM type 2    - Patient with multiple non-convulsive electrographic seizures that were initially refractory. These only presented as worsening mental status. He was also hypothermic with lower WBC. Seizures, leukocytosis, and hypothermia have since resolved on current therapy. Although CNS infection should be on the differential, especially given immunocompromised state with renal transplant, given low temperature and low WBC would favor more systemic process as opposed to primary CNS. Appreciate ID input  - vEEG without seizures, including LLE movements without EEG correlate. STOPPED  - Phenytoin corrected level subtherapeutic but clinically he is doing well and without seizures or side effects. Would continue on current dose of phenytoin 100mg [id number] and STOP checking levels unless he has additional seizures  - Behavioral changes noted, stopped Fycompa  - Vimpat and VPA stopped. Due to sedation also stopped standing Ativan  - LP done and appeared fairly bland. Minimal increase of protein to 46 (from normal ceiling of 45) is non-specific but may be related to recently resolved status epilepticus. Further results unrevealing  - Discussed restarting Eliquis for secondary stroke prevention with family and kidney transplant service. As this interacts with phenytoin would not be as ideal as it would be at lower efficacy. Also not optimal to change AED's given patient has experienced a number of side effects and he has a lower threshold for further seizures. Best options would be either Lovenox or warfarin (adjust to goal INR 2-3) long term. Family and other medical teams are in agreement  - Repeat MRI brain w/ and w/o without clear evidence of infection, inflammation, or acute CNS event (possibly minimal enhancement). 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

## 2022-07-21 NOTE — PROGRESS NOTE ADULT - SUBJECTIVE AND OBJECTIVE BOX
*************************************  NEUROLOGY PROGRESS NOTE  *************************************    SUBJECTIVE/INTERVAL HISTORY:   Pt seen and examined at bedside with attending and neurology team. Patient stated that he is doing well. No acute complaints. Son at bedside notes mental status has improved significantly.     ROS: All systems negative except for documented in Subjective/Interval History    PAST MEDICAL & SURGICAL HISTORY:  Hypertension  Diabetes  Dyslipidemia  CAD (Coronary Artery Disease)  with Stents in 2009 and 2019, s/p off-pump C3L on 20  Hypothyroidism  CVA (cerebral vascular accident)  19 with residual bilateral weakness  Anemia  PEG (percutaneous endoscopic gastrostomy) status  removed 2020  Intubation of airway performed without difficulty  Dec 2019  CVA c/b status epilepticus requiring intubation and PEG in 2019-  ESRD on dialysis  M/W/F  Seizures  after CVA, last seizure was last week 22  History of insertion of stent into coronary artery bypass graft   and   S/P CABG x 3  off pump C3L on 20    FAMILY HISTORY:  Family history of cancer of tongue (Father)  Parents are  . Father - at 80 years, tongue cancer was a smoker. Mother- at 71, had accident while crossing road. Siblings- 2 brothers and one sister.    MEDICATIONS  (STANDING):  atorvastatin 40 milliGRAM(s) Oral at bedtime  carvedilol 12.5 milliGRAM(s) Oral every 12 hours  carvedilol 6.25 milliGRAM(s) Oral once  chlorhexidine 2% Cloths 1 Application(s) Topical <User Schedule>  doxazosin 4 milliGRAM(s) Oral <User Schedule>  enoxaparin Injectable 60 milliGRAM(s) SubCutaneous every 12 hours  epoetin cortney-epbx (RETACRIT) Injectable 07421 Unit(s) SubCutaneous every 7 days  finasteride 5 milliGRAM(s) Oral daily  hydrALAZINE 100 milliGRAM(s) Oral every 8 hours  insulin glargine Injectable (LANTUS) 5 Unit(s) SubCutaneous at bedtime  insulin lispro (ADMELOG) corrective regimen sliding scale   SubCutaneous three times a day with meals  insulin lispro (ADMELOG) corrective regimen sliding scale   SubCutaneous at bedtime  insulin lispro Injectable (ADMELOG) 5 Unit(s) SubCutaneous three times a day before meals  levothyroxine 50 MICROGram(s) Oral daily  melatonin 6 milliGRAM(s) Oral at bedtime  mycophenolate mofetil Suspension 500 milliGRAM(s) Oral <User Schedule>  NIFEdipine XL 60 milliGRAM(s) Oral two times a day  nystatin    Suspension 506665 Unit(s) Oral four times a day  pantoprazole    Tablet 40 milliGRAM(s) Oral daily  phenytoin   Capsule 100 milliGRAM(s) Oral every 8 hours  polyethylene glycol 3350 17 Gram(s) Oral daily  predniSONE  Solution 5 milliGRAM(s) Oral daily  trimethoprim   80 mG/sulfamethoxazole 400 mG 1 Tablet(s) Oral daily    MEDICATIONS  (PRN):  acetaminophen     Tablet .. 650 milliGRAM(s) Oral every 6 hours PRN Mild Pain (1 - 3)      ALLERGIES/INTOLERANCES:  Allergies  No Known Allergies    Intolerances    VITALS & EXAMINATION:  Vital Signs Last 24 Hrs  T(C): 36.4 (2022 08:50), Max: 37.2 (2022 20:40)  T(F): 97.6 (2022 08:50), Max: 99 (2022 20:40)  HR: 68 (2022 08:50) (68 - 80)  BP: 153/61 (2022 08:50) (146/69 - 169/80)  BP(mean): 107 (2022 05:32) (101 - 107)  RR: 20 (2022 08:50) (18 - 20)  SpO2: 100% (2022 08:50) (97% - 100%)    Parameters below as of 2022 08:50  Patient On (Oxygen Delivery Method): room air    General appearance: Well appearing, no acute distress.  Head: normocephalic  Eyes: clear sclera  Extremities: no ezio LE edema  Respiratory: breathing regularly    Focused neurologic exam:  MS - awake, alert, oriented to person, place "[facility]", "2022", Follows simple commands. Attend to stimuli from both sides.   CN - pupils equal round, EOMI, BTT b/l, no apparent facial asymmetry, hearing intact to conversation b/l   Motor - Normal bulk. Inc tone in L side and normal tone in R side. Spontaneous movements of L > R side and at least antigravity.     Sens - LT/temp intact all, as above  Gait and station - PERRY    LABORATORY:  CBC                       9.1    4.52  )-----------( 298      ( 2022 05:54 )             28.7     Chem 07-12    138  |  104  |  23  ----------------------------<  106<H>  4.0   |  24  |  1.16    Ca    8.6      2022 05:53  Phos  2.6       Mg     1.8     -12    TPro  5.8<L>  /  Alb  2.3<L>  /  TBili  0.2  /  DBili  x   /  AST  19  /  ALT  12  /  AlkPhos  138<H>  07-12    LFTs LIVER FUNCTIONS - ( 2022 05:53 )  Alb: 2.3 g/dL / Pro: 5.8 g/dL / ALK PHOS: 138 U/L / ALT: 12 U/L / AST: 19 U/L / GGT: x           Coagulopathy PT/INR - ( 2022 05:54 )   PT: 13.0 sec;   INR: 1.12 ratio         PTT - ( 2022 05:54 )  PTT:39.7 sec  Lipid Panel   A1c   Cardiac enzymes CARDIAC MARKERS ( 2022 00:19 )  x     / x     / x     / x     / 2.2 ng/mL  CARDIAC MARKERS ( 10 Jul 2022 19:12 )  x     / x     / x     / x     / 2.5 ng/mL    STUDIES & IMAGING:  Studies (EKG, EEG, EMG, etc):      < from: MR Head w/wo IV Cont (22 @ 15:23) >    ACC: [id number] EXAM:  MR BRAIN WAW IC                          PROCEDURE DATE:  2022      INTERPRETATION:  CLINICAL INDICATION: Altered mental status, seizures    Magnetic resonance imaging of the brain was carried out with transaxial   SPGR, FLAIR, fast spin echo T2 weighted images, axial susceptibility   weighted series, diffusion weighted series and sagittal T1 weighted   series on a 1.5 Anisa magnet. Post contrast axial, coronal and sagittal   T1 weighted images were obtained. 6 cc of Gadavist were intravenously   injected, 1.5 cc were discarded.    Comparison is made with the prior brain CT of 2022.    Mild to moderate atrophy is identified with ventricular and sulcal   prominence. Extensive small vessel white matter ischemic changes are   noted. There is extensive bifrontal parietal encephalomalacia and gliosis   involving the centrum semiovale ovale in the border zone regions. These   do not demonstrate diffusion restriction and demonstrate bright signal   intensity on T1-weighted images which does not lymphoma on susceptibility   weighted series suggesting laminar necrosis rather than old hemorrhage.   After contrast administration there is mild gyral enhancement in a small   portion of the frontal cortex. There is no evidence of cerebritis or   abscess.    IMPRESSION: Extensive bilateral frontal parietal gliosis and   encephalomalacia with minimal enhancement which may be related to   ischemic changes versus changes of hypertensive encephalopathy in a   patient with renal transplant on immunosuppressants. No evidence of   cerebritis or abscess.    --- End of Report ---    TIMOTHY GOMES MD; Attending Radiologist  This document has been electronically signed. 2022  4:03PM    < end of copied text >    vEEG   EEG SUMMARY/CLASSIFICATION    Abnormal EEG in an altered / a sedated patient.  - Rare right frontal and left frontal sharp waves  - Patient events as described above  - Left frontotemporal slowing  - Mild to moderate generalized slowing    _____________________________________________________________  EEG IMPRESSION/CLINICAL CORRELATE    Abnormal EEG study.  Event of left leg shaking was not epileptic.  Potential epileptogenic foci in the right frontal and left frontal regions.  Structural or functional abnormality in the left frontotemporal region.  Mild to moderate nonspecific diffuse or multifocal cerebral dysfunction.   No seizure seen.    EEG   EEG SUMMARY/CLASSIFICATION    Abnormal EEG in an altered / a sedated patient.  - Infrequent left posterior sharp discharges  - Left frontotemporal slowing  - Mild to moderate generalized slowing    _____________________________________________________________  EEG IMPRESSION/CLINICAL CORRELATE  Structural or functional abnormality in the left frontotemporal region.  Mild to moderate nonspecific diffuse or multifocal cerebral dysfunction.   Findings indicate risk for focal seizures from the left posterior region.     EEG    EEG SUMMARY/CLASSIFICATION    Abnormal EEG in an altered / a sedated patient.  - Rare right frontal and left frontal sharp waves  - Patient events as described above  - Left frontotemporal slowing  - Mild to moderate generalized slowing    _____________________________________________________________  EEG IMPRESSION/CLINICAL CORRELATE    Abnormal EEG study.  Event of left leg shaking was not epileptic.  Potential epileptogenic foci in the right frontal and left frontal regions.  Structural or functional abnormality in the left frontotemporal region.  Mild to moderate nonspecific diffuse or multifocal cerebral dysfunction.   No seizure seen.       *************************************  NEUROLOGY PROGRESS NOTE  *************************************    SUBJECTIVE/INTERVAL HISTORY:   Pt seen and examined at bedside with attending and neurology team. Patient stated that he is doing well. No acute complaints. Son at bedside notes mental status has improved significantly.     ROS: All systems negative except for documented in Subjective/Interval History    PAST MEDICAL & SURGICAL HISTORY:  Hypertension  Diabetes  Dyslipidemia  CAD (Coronary Artery Disease)  with Stents in 2009 and 2019, s/p off-pump C3L on 20  Hypothyroidism  CVA (cerebral vascular accident)  19 with residual bilateral weakness  Anemia  PEG (percutaneous endoscopic gastrostomy) status  removed 2020  Intubation of airway performed without difficulty  Dec 2019  CVA c/b status epilepticus requiring intubation and PEG in 2019-  ESRD on dialysis  M/W/F  Seizures  after CVA, last seizure was last week 22  History of insertion of stent into coronary artery bypass graft   and   S/P CABG x 3  off pump C3L on 20    FAMILY HISTORY:  Family history of cancer of tongue (Father)  Parents are  . Father - at 80 years, tongue cancer was a smoker. Mother- at 71, had accident while crossing road. Siblings- 2 brothers and one sister.    MEDICATIONS  (STANDING):  atorvastatin 40 milliGRAM(s) Oral at bedtime  carvedilol 12.5 milliGRAM(s) Oral every 12 hours  carvedilol 6.25 milliGRAM(s) Oral once  chlorhexidine 2% Cloths 1 Application(s) Topical <User Schedule>  doxazosin 4 milliGRAM(s) Oral <User Schedule>  enoxaparin Injectable 60 milliGRAM(s) SubCutaneous every 12 hours  epoetin cortney-epbx (RETACRIT) Injectable 85451 Unit(s) SubCutaneous every 7 days  finasteride 5 milliGRAM(s) Oral daily  hydrALAZINE 100 milliGRAM(s) Oral every 8 hours  insulin glargine Injectable (LANTUS) 5 Unit(s) SubCutaneous at bedtime  insulin lispro (ADMELOG) corrective regimen sliding scale   SubCutaneous three times a day with meals  insulin lispro (ADMELOG) corrective regimen sliding scale   SubCutaneous at bedtime  insulin lispro Injectable (ADMELOG) 5 Unit(s) SubCutaneous three times a day before meals  levothyroxine 50 MICROGram(s) Oral daily  melatonin 6 milliGRAM(s) Oral at bedtime  mycophenolate mofetil Suspension 500 milliGRAM(s) Oral <User Schedule>  NIFEdipine XL 60 milliGRAM(s) Oral two times a day  nystatin    Suspension 269094 Unit(s) Oral four times a day  pantoprazole    Tablet 40 milliGRAM(s) Oral daily  phenytoin   Capsule 100 milliGRAM(s) Oral every 8 hours  polyethylene glycol 3350 17 Gram(s) Oral daily  predniSONE  Solution 5 milliGRAM(s) Oral daily  trimethoprim   80 mG/sulfamethoxazole 400 mG 1 Tablet(s) Oral daily    MEDICATIONS  (PRN):  acetaminophen     Tablet .. 650 milliGRAM(s) Oral every 6 hours PRN Mild Pain (1 - 3)      ALLERGIES/INTOLERANCES:  Allergies  No Known Allergies    Intolerances    VITALS & EXAMINATION:  Vital Signs Last 24 Hrs  T(C): 36.4 (2022 08:50), Max: 37.2 (2022 20:40)  T(F): 97.6 (2022 08:50), Max: 99 (2022 20:40)  HR: 68 (2022 08:50) (68 - 80)  BP: 153/61 (2022 08:50) (146/69 - 169/80)  BP(mean): 107 (2022 05:32) (101 - 107)  RR: 20 (2022 08:50) (18 - 20)  SpO2: 100% (2022 08:50) (97% - 100%)    Parameters below as of 2022 08:50  Patient On (Oxygen Delivery Method): room air    General appearance: Well appearing, no acute distress.  Head: normocephalic  Eyes: clear sclera  Extremities: no ezio LE edema  Respiratory: breathing regularly    Focused neurologic exam:  MS - awake, alert, oriented to person, place "[facility]", "2022", Follows simple commands. Attend to stimuli from both sides.   CN - pupils equal round, EOMI, BTT b/l, no apparent facial asymmetry, hearing intact to conversation b/l   Motor - Normal bulk. Inc tone in L side and normal tone in R side. Spontaneous movements of L > R side and at least antigravity.     Sens - LT/temp intact all, as above  Gait and station - PERRY    LABORATORY:                          7.7    2.42  )-----------( 342      ( 2022 06:20 )             24.0     07-21    132<L>  |  101  |  47<H>  ----------------------------<  114<H>  4.2   |  21<L>  |  1.37<H>    Ca    8.4      2022 06:20  Phos  3.3     07-21  Mg     1.8     07-21    TPro  6.0  /  Alb  2.5<L>  /  TBili  0.2  /  DBili  x   /  AST  16  /  ALT  11  /  AlkPhos  104  07-21    PT/INR - ( 2022 06:20 )   PT: 13.4 sec;   INR: 1.16 ratio       PTT - ( 2022 06:20 )  PTT:35.3 sec    STUDIES & IMAGING:  Studies (EKG, EEG, EMG, etc):      < from: MR Head w/wo IV Cont (22 @ 15:23) >    ACC: [id number] EXAM:  MR BRAIN WAW IC                          PROCEDURE DATE:  2022      INTERPRETATION:  CLINICAL INDICATION: Altered mental status, seizures    Magnetic resonance imaging of the brain was carried out with transaxial   SPGR, FLAIR, fast spin echo T2 weighted images, axial susceptibility   weighted series, diffusion weighted series and sagittal T1 weighted   series on a 1.5 Anisa magnet. Post contrast axial, coronal and sagittal   T1 weighted images were obtained. 6 cc of Gadavist were intravenously   injected, 1.5 cc were discarded.    Comparison is made with the prior brain CT of 2022.    Mild to moderate atrophy is identified with ventricular and sulcal   prominence. Extensive small vessel white matter ischemic changes are   noted. There is extensive bifrontal parietal encephalomalacia and gliosis   involving the centrum semiovale ovale in the border zone regions. These   do not demonstrate diffusion restriction and demonstrate bright signal   intensity on T1-weighted images which does not lymphoma on susceptibility   weighted series suggesting laminar necrosis rather than old hemorrhage.   After contrast administration there is mild gyral enhancement in a small   portion of the frontal cortex. There is no evidence of cerebritis or   abscess.    IMPRESSION: Extensive bilateral frontal parietal gliosis and   encephalomalacia with minimal enhancement which may be related to   ischemic changes versus changes of hypertensive encephalopathy in a   patient with renal transplant on immunosuppressants. No evidence of   cerebritis or abscess.    --- End of Report ---    TIMOTHY GOMES MD; Attending Radiologist  This document has been electronically signed. 2022  4:03PM    < end of copied text >    vEEG   EEG SUMMARY/CLASSIFICATION    Abnormal EEG in an altered / a sedated patient.  - Rare right frontal and left frontal sharp waves  - Patient events as described above  - Left frontotemporal slowing  - Mild to moderate generalized slowing    _____________________________________________________________  EEG IMPRESSION/CLINICAL CORRELATE    Abnormal EEG study.  Event of left leg shaking was not epileptic.  Potential epileptogenic foci in the right frontal and left frontal regions.  Structural or functional abnormality in the left frontotemporal region.  Mild to moderate nonspecific diffuse or multifocal cerebral dysfunction.   No seizure seen.    EEG   EEG SUMMARY/CLASSIFICATION    Abnormal EEG in an altered / a sedated patient.  - Infrequent left posterior sharp discharges  - Left frontotemporal slowing  - Mild to moderate generalized slowing    _____________________________________________________________  EEG IMPRESSION/CLINICAL CORRELATE  Structural or functional abnormality in the left frontotemporal region.  Mild to moderate nonspecific diffuse or multifocal cerebral dysfunction.   Findings indicate risk for focal seizures from the left posterior region.     EEG    EEG SUMMARY/CLASSIFICATION    Abnormal EEG in an altered / a sedated patient.  - Rare right frontal and left frontal sharp waves  - Patient events as described above  - Left frontotemporal slowing  - Mild to moderate generalized slowing    _____________________________________________________________  EEG IMPRESSION/CLINICAL CORRELATE    Abnormal EEG study.  Event of left leg shaking was not epileptic.  Potential epileptogenic foci in the right frontal and left frontal regions.  Structural or functional abnormality in the left frontotemporal region.  Mild to moderate nonspecific diffuse or multifocal cerebral dysfunction.   No seizure seen.

## 2022-07-21 NOTE — PROGRESS NOTE ADULT - ASSESSMENT
66 yr old M with Type 2 DM> unknown control due to skewed A1C 6.6% secondary to chronic anemia and s/p blood tx. On basal/bolus insulin therapy PTA. DM c/b ESRD s/p DDRT on 3/21, CVA, CAD s/p CABG, Afib on apixaban, seizure activity, HTN, HLD, h/o GI bleed in 2/2022 EGD with duodenal ulcer, discharged to [facility] rehab center after prior hospitalization, now re-admitted from [facility] for seizures c/f status epilepticus in setting of UTI. endocrine consulted for steroid induced hyperglycemia, now on home dose prednisone 5mg. Prolonged hospital course w/ ICU stay, previously intubated/extubated, previous seizures. S/p ureteral stent removal 7/12/22 pt is now off tube feeds and tolerating PO diet.     1. Type 2 diabetes mellitus with hypoglycemia, with long-term current use of insulin.   ·  Plan: Test BG AC/HS BG Goal 100-180mg/dl   FBG at goal, bedtime BG tigthly controlled after adjusted admelog, reduce admelog to prevent hypoglycemia; anticipate lunch BG today may be elevated as pt had additional Nepro approximately 10 am , encouraged additional po intake at breakfast to prevent hypoglycemia as pt had low volume on breakfast tray and received full admelog dose which was recently adjusted .   -Lantus 5 units at bedtime.    -Admelog 4 units tid with meals ensure pt tolerating meal prior to administration to prevent hypoglycemia  -low Admelog  correction scale AC meals and Low HS scale  -Please inform endo team of any changes on steroid therapy or PO/TF status  -cons carb diet    Discharge  plan to rehab, c/w basal bolus insulin final doses TBD   Outpatient f/u with Endocrinologist Dr. Lincoln. [address] [facility] [address]. Phone  Needs apt at time of discharge  -Needs optho/renal/cardiac f/u as out pt  -Make sure pt has insulin sand DM supplies at time of discharge.    2. HTN (hypertension).   ·  Plan: BP goal in DM   being managed  by primary team   currently not on any BP agents  outpt urine mc.cr ratio to be done.    3. Hypothyroidism.   ·  Plan:   c/w LT4 50 mcg daily on an empty stomach at least 1 hour apart from food or PO meds  - monitor TFTs outpatient in 4 weeks after dc with Dr. Lincoln    Discussed with patient and RN   Contact via [employer] Teams during business hours  On evenings and weekends, please call [phone] or page endocrine fellow on call.   Please note that this patient may be followed by different provider tomorrow.    Time spent on encounter: 26 minutes spent on total encounter; The necessity of the time spent during the encounter on this date of service was due to development of plan of care/coordination of care/glycemic control through review of labs, blood glucose values and vital signs.

## 2022-07-21 NOTE — PROGRESS NOTE ADULT - ASSESSMENT
66M with h/o DM type II, HTN, CAD s/p CABG in 2020, Afib on Eliquis, CVA in 2019 due to Afib, Seizure d/o (last episode was on 4/8/22), h/o GI bleed in 2/2022 EGD with duodenal ulcer and ESRD on HD s/p R DDRT from DCD donor on 4/21/22 complicated by DGF requiring HD until 4/29/22 now with good graft function who presented with status epilepticus in setting of UTI/antibiotics and sub-therapeutic valproic acid level     Seizure Disorder:  - MRI head 6/27 with less concerns for PRES, likely ischemic changes  - Remains seizure free  - Antiepileptic regimen per Neurology, on phenytoin 100mg TID   - Phenytoin level discussed with Neuro: no need for dose adjustment   - Keep Mag > 2    R DCD DDRT (ureter stented) 4/21/22:  - DSA neg; SCr better  - Continue Doxazosin/Proscar  - ESBL Klebsiella UTI (post stent removal): completed Ertapenem.   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 Vocal cords good. Reflux precautions (no spicy food and caffeine once has diet). Voice rest. overall improving  - OOB with RN/PT  - Anemia: check FOBT, T&S with am labs; continue Retacrit weekly  - Leukopenia: held vacyte; check CMV PCR    Immunosuppression:   - Belatacept: 6/23, 7/4, 4/18 (initial loading). Re-loaded 7/8 as there was a gap between doses 2/2 seizures.  - Cellcept to 500 BID (1/2 dose given ESBL Kleb UTI, will continue at this dose given leukopenia)  - continue Pred 5  - PPx: Valcyte/PPI/nystatin (thrush)/Bactrim    DM  - on Lantus/Lispro, Endocrine following     AFib:  - Eliquis with ~40% less efficacy while on fosphenytoin.  On Lovenox 60BID. Plan to continue switch to Coumadin once discharged home from rehab. Plan discussed with neurology and family  - rate controlled    R IJ DVT  - On Lovenox 60BID    HTN:  - Nifedipine/Hydralazine/Cardura. adjust prn  - Increase Coreg 12.5mg bid     DISPO  - dispo plan for Friday   -to f/u with Dr. Andre Patterson, Epilepsy team upon d/c per family request

## 2022-07-22 NOTE — PROGRESS NOTE ADULT - SUBJECTIVE AND OBJECTIVE BOX
Transplant Surgery Multidisciplinary Progress Note   --------------------------------------------------------------  R DCD DDRT    4/21/22    Present: Patient seen and examined with multidisciplinary team including Transplant Surgeon: Dr. Davis, Nephrologist: Dr. Waqar Lomeli, Pharmacist: WALDO Lim and unit RN during am rounds.  Disciplines not in attendance will be notified of the plan.     HPI: 66M with PMH: DM type II, HTN, CAD s/p CABG in 2020, AFib on Eliquis, CVA in 2019 due to Afib, Seizure d/o following CVA, last episode was on 4/8/22, h/o GIB in 2/2022 EGD with duodenal ulcer.  ESRD due to DM was on HD since 2019.     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  Donor was 58 , KDPI 82%, DCD, single vessels and ureter, HLA mismatch 1, 2, 2. No DSA, cPRA 0%. 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 Re-admitted in May for anemia in the setting of large perinephric hematoma s/p evacuation and repair of arterial anastomosis on 5/2/22. Intra operative biopsy showed no rejection, Creatinine ranging ~2mg/dL.    In Rehab:  He was recently dx with klebsiella UTI rx with ertapenem - then switched to Levaquin day #6.    He also had parainfluenza pneumonia. Was at rehab progressing well.      Hospital course:  - 6/10: transferred from rehab facility for seizure status epilepticus. Intubated for respiratory protection and transferred to MICU.  EEG showed no seizure activity. Neuro consulted.   - 6/11: Extubated. Found to have R pleural effusion. s/p Thoracentesis 1.3L. Eliquis changed to heparin drip.  Post procedure drop in H&H. 5u PRBC, 2 u FFP.   - 6/11 evening: Transferred to SICU from MICU for further care in setting of hypoxia, significant R pleural effusion, anemia and hemodynamic instability  - 6/11 Intubated at 9pm and sedated on Precedex.  CT placed, 600ml blood drained.  1L total overnight, started pressors  - 6/11 Received Protamine for PTT >100 (was on Heparin drip started for AF), 2 u FFP and 5u PRBC total  - 6/13 s/p VATS 2.2L old blood removed; second chest tube placed  - 6/18-19: chest tubes removed  - 6/21: ?PRES vs. chronic ischemic changes on MRI, off Envarsus, switched to Belatacept 6/23 with good graft function  - 6/25: Tx to SICU for refractory seizures, improved with IV antiepileptic regimen  - 7/10: Passed bedside S&S in SICU. Downgraded from SICU to floor.   - 7/11: OR-->URETERAL STENT REMOVED  - 7/15: ESBL Kleb UTI (50-90K), completed Ertapenem x 3 Days    Interval Events:  - Afebrile, mild HTN, rest of VSS  - no complaints, in good spirits, AAOx3  - working with PT with heavy assistance, improving mobility daily  - tolerating full PO  - good UO without diuretics    Immunosuppression:   Induction:  Thymoglobulin                                        Maintenance:  - Belatacept: 6/23, 7/4, 7/8, 7/18. Next dose 8/1  -  BID (leukopenia), Pred 5mg daily   - Ongoing monitoring for signs of rejection.    Potential Discharge date: pending clinical improvement   Education:  Medications  Plan of care:  See Below        MEDICATIONS  (STANDING):  atorvastatin 40 milliGRAM(s) Oral at bedtime  carvedilol 12.5 milliGRAM(s) Oral every 12 hours  chlorhexidine 2% Cloths 1 Application(s) Topical <User Schedule>  doxazosin 4 milliGRAM(s) Oral <User Schedule>  enoxaparin Injectable 60 milliGRAM(s) SubCutaneous every 12 hours  epoetin cortney-epbx (RETACRIT) Injectable 29637 Unit(s) SubCutaneous every 7 days  finasteride 5 milliGRAM(s) Oral daily  hydrALAZINE 100 milliGRAM(s) Oral every 8 hours  insulin glargine Injectable (LANTUS) 5 Unit(s) SubCutaneous at bedtime  insulin lispro (ADMELOG) corrective regimen sliding scale   SubCutaneous three times a day with meals  insulin lispro (ADMELOG) corrective regimen sliding scale   SubCutaneous at bedtime  insulin lispro Injectable (ADMELOG) 4 Unit(s) SubCutaneous three times a day before meals  levothyroxine 50 MICROGram(s) Oral daily  melatonin 6 milliGRAM(s) Oral at bedtime  mycophenolate mofetil Suspension 500 milliGRAM(s) Oral <User Schedule>  NIFEdipine XL 60 milliGRAM(s) Oral two times a day  nystatin    Suspension 729791 Unit(s) Oral four times a day  pantoprazole    Tablet 40 milliGRAM(s) Oral daily  phenytoin   Capsule 100 milliGRAM(s) Oral every 8 hours  polyethylene glycol 3350 17 Gram(s) Oral daily  predniSONE  Solution 5 milliGRAM(s) Oral daily  trimethoprim   80 mG/sulfamethoxazole 400 mG 1 Tablet(s) Oral daily    MEDICATIONS  (PRN):  acetaminophen     Tablet .. 650 milliGRAM(s) Oral every 6 hours PRN Mild Pain (1 - 3)            Vital Signs Last 24 Hrs  T(C): 36.6 (22 Jul 2022 09:05), Max: 37.1 (21 Jul 2022 17:00)  T(F): 97.9 (22 Jul 2022 09:05), Max: 98.7 (21 Jul 2022 17:00)  HR: 62 (22 Jul 2022 09:05) (62 - 79)  BP: 156/74 (22 Jul 2022 09:05) (151/63 - 178/74)  BP(mean): 105 (21 Jul 2022 23:10) (105 - 105)  RR: 20 (22 Jul 2022 09:05) (18 - 20)  SpO2: 95% (22 Jul 2022 09:05) (95% - 99%)    Parameters below as of 22 Jul 2022 09:05  Patient On (Oxygen Delivery Method): room air        I&O's Summary    21 Jul 2022 07:01  -  22 Jul 2022 07:00  --------------------------------------------------------  IN: 1560 mL / OUT: 2050 mL / NET: -490 mL                              7.7    2.02  )-----------( 355      ( 22 Jul 2022 06:18 )             24.4     07-22    130<L>  |  99  |  49<H>  ----------------------------<  203<H>  4.1   |  21<L>  |  1.43<H>    Ca    8.4      22 Jul 2022 06:18  Phos  3.2     07-22  Mg     1.8     07-22    TPro  5.8<L>  /  Alb  2.4<L>  /  TBili  0.1<L>  /  DBili  x   /  AST  21  /  ALT  19  /  AlkPhos  109  07-22            REVIEW OF SYSTEMS  ------------------------------------  Gen: +fatigue, No fevers/chills, +weakness--all improving  Skin: No rashes  Head/Eyes/Ears/Mouth: No headache; Normal hearing; Normal vision w/o blurriness; No sinus pain/discomfort, sore throat  Respiratory: No dyspnea, cough, wheezing, hemoptysis  CV: No chest pain, PND, orthopnea  GI: No abdominal pain, diarrhea, constipation, nausea, vomiting, melena, hematochezia  : No increased frequency, dysuria, hematuria, nocturia  MSK: No joint pain/swelling; no back pain; no edema  Neuro: No dizziness/lightheadedness,  seizures, numbness, tingling, +weakness  Heme: No easy bruising or bleeding  Endo: No heat/cold intolerance  Psych: No significant nervousness, anxiety, stress, depression  All other systems were reviewed and are negative, except as noted.    PHYSICAL EXAM:  Constitutional: Well developed / well nourished  Eyes: Anicteric, PERRLA  ENMT: nc/at, +oral thrush improving  Neck: supple  Respiratory: CTA   Cardiovascular: RRR  Gastrointestinal: Soft, non distended, NT, RLQ incision healed   Genitourinary: voiding spontaneously  Extremities: SCD's in place and working bilaterally, overall edema has improved  Vascular: Palpable dp pulses bilaterally  Neurological: AAOx3  Skin: no rashes, ulcerations or lesions  Musculoskeletal: Moving all extremities, global weakness  Psychiatric: calm, follows commands and interacts appropriately

## 2022-07-22 NOTE — PROGRESS NOTE ADULT - ASSESSMENT
66 yr old Male with PMHx ESRD s/p permacath removal 5/10/22 s/p Rt DDRT 4/21/22,  CVA (2019), Afib on apixaban, seizure activity, CAD s/p stents, CABG (2020), HTN, HLD, DM on insulin, gastric/duodenal ulcer, recent hospitalization 4/28/22 -5/10/22 with weakness/anemia found to have perinephric hematoma requiring evacuation and repair of bleeding arterial anastomosis. Curently being treated for UTI with Levaquin Today after developing multiple sz episodes refractory to 5 mg versed IM (EMS) and 2 mg ativan IV in E.D. concerning for status epilepticus requiring intubation for hypoxic respiratory  failure. MICU Consult was called for hypoxic respiratory failure secondary to status epilepticus.  Nephrology consulted for renal failure.     A/P  DDRT on 04/21/22  Course complicated by DGF, was on HD until 4/29/22. Readmitted in May for anemia in the setting of large perinephric hematoma s/p evacuation and repair of arterial anastomosis on 5/2/22. Intra operative biopsy showed no rejection.  ANA was initially sec to  hemodynamic change   stent removal 07/12/22  scr remains relatively stable   transplant team on board   allograft US, DSA negative   monitor BMP, U/O     HYponatremia   avoid hypotonic solution   optimize glucose   monitor BMP closely     History of Hyperkalemia  Secondary to Bactrim now resolved off Bactrim.    HTN   optimal   manage by transplant team    monitor BP closely     UTI  Urine culture with ESBL E.coli.  cefepime changed to ertapenem.     Immunosuppression per transplant team  Induction:  Thymoglobulin           Envarsus discontinued for possible neurotoxicity. Continue     Belatacept, next dose on 7/18      MMF 500mg po BID decreased because of cytopenia and UTI      Prednisone 5mg po daily       For PCP prophylaxis,   Transplant team changed atovaquone to bactrim as hyperkalemia has resolved,     CMV prophylaxis.   continue Valcyte 450mg po daily (CMV intermediate risk)

## 2022-07-22 NOTE — PROGRESS NOTE ADULT - ASSESSMENT
66M with h/o DM type II, HTN, CAD s/p CABG in 2020, Afib on Eliquis, CVA in 2019 due to Afib, Seizure d/o (last episode was on 4/8/22), h/o GI bleed in 2/2022 EGD with duodenal ulcer and ESRD on HD s/p R DDRT from DCD donor on 4/21/22 complicated by DGF requiring HD until 4/29/22 now with good graft function who presented with status epilepticus in setting of UTI/antibiotics and sub-therapeutic valproic acid level     Seizure Disorder:  - MRI head 6/27 with less concerns for PRES, likely ischemic changes  - Remains seizure free  - Antiepileptic regimen per Neurology, on Phenytoin 100mg TID, no further adjustment required  - Keep Mag > 2    R DCD DDRT 4/21/22:  - DSA neg  - Cr ~1.4-1.5 over the past few days, doppler with good flow  - Continue Doxazosin/Proscar for BPH  - ESBL Klebsiella UTI (post stent removal): completed Ertapenem.   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 Vocal cords good. Reflux precautions. Voice rest. overall improving  - OOB with RN/PT  - Anemia: stable, no acute signs of bleedinga this time.  Continue Retacrit weekly  - Leukopenia: held valcyte; f/u CMV PCR    Immunosuppression:   - Belatacept: 6/23, 7/4, 4/18 (initial loading). Re-loaded 7/8 as there was a gap between doses 2/2 seizures.  - Cellcept to 500 BID (1/2 dose given ESBL Kleb UTI, but will continue at this dose given leukopenia)  - continue Pred 5  - PPx: Valcyte HELD/PPI/nystatin (thrush)/Bactrim    DM  - on Lantus/Lispro, Endocrine following     AFib:  - Eliquis with ~40% less efficacy while on fosphenytoin.  On Lovenox 60BID. Plan to continue switch to Coumadin once discharged home from rehab. Plan discussed with neurology and family  - rate controlled    R IJ DVT  - On Lovenox 60BID    HTN:  - Nifedipine/Hydralazine/Cardura/Coreg. adjust prn    DISPO  - given hospital course and currently mild lab abnormalities, will monitor today and hold Acute Rehab d/c  - f/u with Dr. Andre Patterson, Epilepsy team upon d/c per family request  - f/u with Dr. Lincoln, Endocrine upon d/c

## 2022-07-22 NOTE — PROVIDER CONTACT NOTE (OTHER) - ASSESSMENT
Pt. A&Ox4, VS as charted, /74. Pt. symptomatic, denies c/p, dizziness, blurry vision. Pt. A&Ox4, VS as charted, /74. Pt. asymptomatic, denies c/p, dizziness, blurry vision.

## 2022-07-22 NOTE — PROGRESS NOTE ADULT - ASSESSMENT
66 yr old M with Type 2 DM> unknown control due to skewed A1C 6.6% secondary to chronic anemia and s/p blood tx. On basal/bolus insulin therapy PTA. DM c/b ESRD s/p DDRT on 3/21, CVA, CAD s/p CABG, Afib on apixaban, seizure activity, HTN, HLD, h/o GI bleed in 2/2022 EGD with duodenal ulcer, discharged to [facility] rehab center after prior hospitalization, now re-admitted from [facility] for seizures c/f status epilepticus in setting of UTI. endocrine consulted for steroid induced hyperglycemia, now on home dose prednisone 5mg. Prolonged hospital course w/ ICU stay, previously intubated/extubated, previous seizures. S/p ureteral stent removal 7/12/22 pt is now off tube feeds and tolerating PO diet.     1. Type 2 diabetes mellitus with hypoglycemia, with long-term current use of insulin.   ·  Plan: Test BG AC/HS BG Goal 100-180mg/dl   FBG above goal, prandial BG above goal at lunch/dinner , pt w/ good appetite tolerating PO favor increasing basal insulin first and monitoring BG as FS was previously very tightly controlled w/ po intake on admelog 5 units   -Lantus 6  units at bedtime.    -Admelog 4 units tid with meals ensure pt tolerating meal prior to administration to prevent hypoglycemia  -low Admelog  correction scale AC meals and Low HS scale  -Please inform endo team of any changes on steroid therapy or PO/TF status  -cons carb diet    Discharge  plan to rehab, if d/c in next 24 hours:  c/w basal bolus insulin final doses as above if FBG at goal  Titrate further at rehab as necessary .   Outpatient f/u with Endocrinologist Dr. Lincoln. [address] [facility] [address]. Phone  Needs apt at time of discharge  -Needs optho/renal/cardiac f/u as out pt  -Make sure pt has insulin and DM supplies at time of discharge.    2. HTN (hypertension).   ·  Plan: BP goal in DM   being managed  by primary team   currently not on any BP agents  outpt urine mc.cr ratio to be done.    3. Hypothyroidism.   ·  Plan:   c/w LT4 50 mcg daily on an empty stomach at least 1 hour apart from food or PO meds  - monitor TFTs outpatient in 4 weeks after dc with Dr. Lincoln    Discussed with patient and Transplant PA Saeid   Contact via [employer] Teams during business hours  On evenings and weekends, please call [phone] or page endocrine fellow on call.   Please note that this patient may be followed by different provider tomorrow.    Time spent on encounter: 26 minutes spent on total encounter; The necessity of the time spent during the encounter on this date of service was due to development of plan of care/coordination of care/glycemic control through review of labs, blood glucose values and vital signs.

## 2022-07-22 NOTE — PROGRESS NOTE ADULT - SUBJECTIVE AND OBJECTIVE BOX
[facility] DIVISION OF KIDNEY DISEASES AND HYPERTENSION   FOLLOW UP NOTE    --------------------------------------------------------------------------------  HPI: 66 yr old man with h/o ESRD due to DM on HD since 2019, s/p DDRT from DCD donor on 4/21/22 complicated by DGF requiring HD until 4/29/22.   PMH: DM type II, HTN, CAD s/p CABG in 2020, Afib on Eliquis, CVA in 2019 due to Afib, Seizure d/o following CVA, h/o GI bleed in 2/2022 EGD with duodenal ulcer.  Donor was 58 , KDPI 82%, DCD, single vessels and ureter, HLA mismatch 1, 2, 2. No DSA, cPRA 0%. CMV +/+    Hospitalized May 2022 for anemia in the setting of large perinephric hematoma s/p evacuation and repair of arterial anastomosis on 5/2/22. Intra operative biopsy showed no rejection.  H/o seizure d/o with last seizure episode prior to transplant was on 4/8/22. He is readmitted for status epilepticus in the setting of sub therapeutic valproic acid levels. Course complicated by respiratory failure, hemothorax and hemorrhagic shock following right thoracentesis done on 6/10 for effusion. S/p PRBC x 5 units and vasopressors, chest tube, followed by VATS.    Envarsus discontinued and started on belatacept due to concern for neurotoxicity. Allograft function is stable, mental status is slowly improving. He is on fosphenytoin with good control of seizures. Renal sonogram done on 7/19 showed no hydronephrosis. MARA was not detected on US. DSA sent on 7/19/22 were negative.    Pt. was seen and examined today, reports feeling much better.. Scr stable at 1.4 today however BUN increased to 49. SNa stable at 130 today. . Pt nonoliguric with UOP of 2L as documented.       PAST HISTORY  --------------------------------------------------------------------------------  No significant changes to PMH, PSH, FHx, SHx, unless otherwise noted    ALLERGIES & MEDICATIONS  --------------------------------------------------------------------------------  Allergies    No Known Allergies    Intolerances      Standing Inpatient Medications  atorvastatin 40 milliGRAM(s) Oral at bedtime  carvedilol 12.5 milliGRAM(s) Oral every 12 hours  chlorhexidine 2% Cloths 1 Application(s) Topical <User Schedule>  doxazosin 4 milliGRAM(s) Oral <User Schedule>  enoxaparin Injectable 60 milliGRAM(s) SubCutaneous every 12 hours  epoetin cortney-epbx (RETACRIT) Injectable 60891 Unit(s) SubCutaneous every 7 days  finasteride 5 milliGRAM(s) Oral daily  hydrALAZINE 100 milliGRAM(s) Oral every 8 hours  insulin glargine Injectable (LANTUS) 5 Unit(s) SubCutaneous at bedtime  insulin lispro (ADMELOG) corrective regimen sliding scale   SubCutaneous three times a day with meals  insulin lispro (ADMELOG) corrective regimen sliding scale   SubCutaneous at bedtime  insulin lispro Injectable (ADMELOG) 4 Unit(s) SubCutaneous three times a day before meals  levothyroxine 50 MICROGram(s) Oral daily  melatonin 6 milliGRAM(s) Oral at bedtime  mycophenolate mofetil Suspension 500 milliGRAM(s) Oral <User Schedule>  NIFEdipine XL 60 milliGRAM(s) Oral two times a day  nystatin    Suspension 632043 Unit(s) Oral four times a day  pantoprazole    Tablet 40 milliGRAM(s) Oral daily  phenytoin   Capsule 100 milliGRAM(s) Oral every 8 hours  polyethylene glycol 3350 17 Gram(s) Oral daily  predniSONE  Solution 5 milliGRAM(s) Oral daily  trimethoprim   80 mG/sulfamethoxazole 400 mG 1 Tablet(s) Oral daily    PRN Inpatient Medications  acetaminophen     Tablet .. 650 milliGRAM(s) Oral every 6 hours PRN      REVIEW OF SYSTEMS  --------------------------------------------------------------------------------  Gen: No fevers/chills,  Head/Eyes/Ears: No HA   Respiratory: No dyspnea, cough  CV: No chest pain  GI: No abdominal pain, diarrhea, as per hPI  : No dysuria, hematuria,as per HPI  MSK: No  edema  Skin: No rashes  Heme: No easy bruising or bleeding    All other systems were reviewed and are negative, except as noted.    VITALS/PHYSICAL EXAM  --------------------------------------------------------------------------------  T(C): 36.6 (07-22-22 @ 09:05), Max: 37.1 (07-21-22 @ 17:00)  HR: 62 (07-22-22 @ 09:05) (62 - 79)  BP: 156/74 (07-22-22 @ 09:05) (151/63 - 178/74)  RR: 20 (07-22-22 @ 09:05) (18 - 20)  SpO2: 95% (07-22-22 @ 09:05) (95% - 99%)  Wt(kg): --    07-21-22 @ 07:01  -  07-22-22 @ 07:00  --------------------------------------------------------  IN: 1560 mL / OUT: 2050 mL / NET: -490 mL    Physical Exam:  	Gen: NAD  	HEENT: Anicteric  	Pulm: CTA B/L  	CV: S1S2+  	Abd: Soft, +BS               Transplant site: RLQ non tender, well healed surgical scar+  	Ext: No LE edema B/L  	Neuro: Awake  	Skin: Warm and dry    LABS/STUDIES  --------------------------------------------------------------------------------              7.7    2.02  >-----------<  355      [07-22-22 @ 06:18]              24.4     130  |  99  |  49  ----------------------------<  203      [07-22-22 @ 06:18]  4.1   |  21  |  1.43        Ca     8.4     [07-22-22 @ 06:18]      Mg     1.8     [07-22-22 @ 06:18]      Phos  3.2     [07-22-22 @ 06:18]    Creatinine Trend:  SCr 1.43 [07-22 @ 06:18]  SCr 1.37 [07-21 @ 06:20]  SCr 1.50 [07-20 @ 05:56]  SCr 1.41 [07-19 @ 06:03]  SCr 1.34 [07-18 @ 06:09]    Urinalysis - [07-18-22 @ 10:17]      Color Light Yellow / Appearance Clear / SG 1.018 / pH 6.0      Gluc Negative / Ketone Negative  / Bili Negative / Urobili Negative       Blood Negative / Protein 30 mg/dL / Leuk Est Negative / Nitrite Negative      RBC 3 / WBC 2 / Hyaline 0 / Gran  / Sq Epi  / Non Sq Epi 1 / Bacteria Negative    Urine Creatinine 72      [07-18-22 @ 10:18]  Urine Protein 58      [07-18-22 @ 10:18]  Urine Sodium 40      [07-18-22 @ 10:18]  Urine Urea Nitrogen 585      [07-18-22 @ 10:18]  Urine Potassium 22      [07-18-22 @ 10:18]  Urine Osmolality 370      [07-18-22 @ 10:18]    Syphilis Screen (Treponema Pallidum Ab) Negative      [06-27-22 @ 02:00]    CMV PCRCMVPCR Log: NotDetec [id number]/mL (06-26 @ 18:14)  CMVPCR Log: NotDetec [id number]/mL (06-25 @ 14:08)    Parvo PCRParvovirus B19 DNA by PCR: NotDetec IU/mL (06-26 @ 18:14)

## 2022-07-22 NOTE — PROGRESS NOTE ADULT - ASSESSMENT
66 yr old man with h/o ESRD due to DM on HD since 2019, s/p DDRT from DCD donor on 4/21/22 complicated by DGF requiring HD until 4/29/22 now admitted with seizures/status epilepticus    1. S/p DCD DDRT on 4/21/22  - creatinine slowly uptrending to 1.5. Electrolytes and volume status fair. Transplant ureteric stent removed 7/12. Renal sonogram done on 7/19 showed no hydronephrosis. MARA was not detected on US. DSA sent on 7/19/22 were negative. Pt received IVF (500 cc NS) on 7/20/22. SCr today stable at 1.4. Pt nonoliguric with UOP of 2L as documented. Monitor labs and urine output.     2. Immunosuppression - Envarsus discontinued for possible neurotoxicity. Continue Belatacept, last dose on 7/18. Next dose on 8/1/22.     Continue Cellcept to 500mg po bid - dose reduced on 7/16 for cytopenia and UTI      Continue Prednisone 5mg po daily      Infection prophylaxis - continue bactrim ss daily    continue holding valcyte 450mg daily (CMV intermediate risk) for now given leucopenia.     3. Seizure d/o -admitted with status epilepticus .Corrected phenytoin level 3.7 (7/21). As per neurology to continue current dose of phenytoin.  Dose being adjusted as per levels. Neurology following.      4. HTN:  BP uncontrolled. On Hydralazine 100mg q8, Nifedipine 60mg po bid, Doxazosin 4mg PO BID. Continue Coreg 12.5 mg BID. Monitor BP.     5. BPH with urine retention - continue Proscar and doxazosin. Recent bladder scan shows minimal PVR. Monitor UOP.    6. Cytopenia -  Continue epo 10,000 weekly for anemia, continue MMF to 500mg po bid. Hold valcyte for now given downtrending WBC.     7. Atrial fibrillation - was on Eliquis, now on therapeutic Lovenox due to drug interaction with fosphenytoin. On Lovenox for AC. Family comfortable with continuing with Lovenox for now rather than coumadin.     8. ESBL Klebsiella UTI - Completed ertapenem x 3 days total per transplant ID.    9. Hyponatremia: SNa slowly downtrending to 129 on 7/18. Urine studies reviewed. SNa stable at 130 today. Fluid restriction 1L. Monitor SNa. Avoid hypotonic fluids.     10. Anemia: Pt with downtrending Hb 8.7>7.7. Pt on lovenox. No occult Gi bleed. Continue epogen. Monitor H/H.

## 2022-07-22 NOTE — PROGRESS NOTE ADULT - SUBJECTIVE AND OBJECTIVE BOX
DIABETES FOLLOW UP : Seen earlier today    INTERVAL HX: FBG above goal , prandial BG remain elevated at lunch/dinner; per son at bedside pt ate full breakfast + nepro + mighty shake . no new complaints . did not receive lunch admelog due to medication not approved by pharmacy in time      Review of Systems:  General: As above      Allergies    No Known Allergies    Intolerances      MEDICATIONS  (STANDING):  atorvastatin 40 milliGRAM(s) Oral at bedtime  carvedilol 12.5 milliGRAM(s) Oral every 12 hours  chlorhexidine 2% Cloths 1 Application(s) Topical <User Schedule>  doxazosin 4 milliGRAM(s) Oral <User Schedule>  enoxaparin Injectable 60 milliGRAM(s) SubCutaneous every 12 hours  epoetin cortney-epbx (RETACRIT) Injectable 71015 Unit(s) SubCutaneous every 7 days  finasteride 5 milliGRAM(s) Oral daily  hydrALAZINE 100 milliGRAM(s) Oral every 8 hours  insulin glargine Injectable (LANTUS) 6 Unit(s) SubCutaneous at bedtime  insulin lispro (ADMELOG) corrective regimen sliding scale   SubCutaneous three times a day with meals  insulin lispro (ADMELOG) corrective regimen sliding scale   SubCutaneous at bedtime  insulin lispro Injectable (ADMELOG) 4 Unit(s) SubCutaneous three times a day before meals  levothyroxine 50 MICROGram(s) Oral daily  melatonin 6 milliGRAM(s) Oral at bedtime  mycophenolate mofetil Suspension 500 milliGRAM(s) Oral <User Schedule>  NIFEdipine XL 60 milliGRAM(s) Oral two times a day  nystatin    Suspension 638466 Unit(s) Oral four times a day  pantoprazole    Tablet 40 milliGRAM(s) Oral daily  phenytoin   Capsule 100 milliGRAM(s) Oral every 8 hours  polyethylene glycol 3350 17 Gram(s) Oral daily  predniSONE  Solution 5 milliGRAM(s) Oral daily  trimethoprim   80 mG/sulfamethoxazole 400 mG 1 Tablet(s) Oral daily        atorvastatin   40 milliGRAM(s) Oral (07-21-22 @ 21:48)    finasteride   5 milliGRAM(s) Oral (07-22-22 @ 12:56)    insulin glargine Injectable (LANTUS)   5 Unit(s) SubCutaneous (07-21-22 @ 21:45)    insulin lispro (ADMELOG) corrective regimen sliding scale   2 Unit(s) SubCutaneous (07-22-22 @ 12:55)   2 Unit(s) SubCutaneous (07-22-22 @ 09:22)   3 Unit(s) SubCutaneous (07-21-22 @ 17:45)    insulin lispro Injectable (ADMELOG)   4 Unit(s) SubCutaneous (07-22-22 @ 12:55)   4 Unit(s) SubCutaneous (07-22-22 @ 09:21)   4 Unit(s) SubCutaneous (07-21-22 @ 17:43)    levothyroxine   50 MICROGram(s) Oral (07-22-22 @ 06:02)    predniSONE  Solution   5 milliGRAM(s) Oral (07-22-22 @ 06:03)        PHYSICAL EXAM:  VITALS: T(C): 36.5 (07-22-22 @ 12:51)  T(F): 97.7 (07-22-22 @ 12:51), Max: 98.7 (07-21-22 @ 17:00)  HR: 91 (07-22-22 @ 12:51) (62 - 91)  BP: 128/81 (07-22-22 @ 12:51) (128/81 - 178/74)  RR:  (18 - 20)  SpO2:  (95% - 99%)  Wt(kg): --  GENERAL: male sitting in chair in NAD  Respiratory: Respirations unlabored   Extremities: Warm, no edema  NEURO: Alert , appropriate       LABS:    POCT Blood Glucose.: 210 mg/dL (07-22-22 @ 12:04)  POCT Blood Glucose.: 218 mg/dL (07-22-22 @ 09:18)  POCT Blood Glucose.: 159 mg/dL (07-21-22 @ 21:36)  POCT Blood Glucose.: 287 mg/dL (07-21-22 @ 17:21)  POCT Blood Glucose.: 273 mg/dL (07-21-22 @ 12:37)  POCT Blood Glucose.: 131 mg/dL (07-21-22 @ 08:35)  POCT Blood Glucose.: 113 mg/dL (07-20-22 @ 21:51)  POCT Blood Glucose.: 272 mg/dL (07-20-22 @ 17:21)  POCT Blood Glucose.: 319 mg/dL (07-20-22 @ 13:32)  POCT Blood Glucose.: 253 mg/dL (07-20-22 @ 12:24)  POCT Blood Glucose.: 153 mg/dL (07-20-22 @ 08:25)  POCT Blood Glucose.: 117 mg/dL (07-19-22 @ 21:14)  POCT Blood Glucose.: 144 mg/dL (07-19-22 @ 18:50)  POCT Blood Glucose.: 91 mg/dL (07-19-22 @ 17:10)                            7.7    2.02  )-----------( 355      ( 22 Jul 2022 06:18 )             24.4       07-22    130<L>  |  99  |  49<H>  ----------------------------<  203<H>  4.1   |  21<L>  |  1.43<H>    Ca    8.4      22 Jul 2022 06:18  Phos  3.2     07-22  Mg     1.8     07-22    TPro  5.8<L>  /  Alb  2.4<L>  /  TBili  0.1<L>  /  DBili  x   /  AST  21  /  ALT  19  /  AlkPhos  109  07-22      eGFR: 54 mL/min/1.73m2 (22 Jul 2022 06:18)          Thyroid Function Tests:  07-06 @ 18:36 TSH 4.69 FreeT4 -- T3 43 Anti TPO -- Anti Thyroglobulin Ab -- TSI --          A1C with Estimated Average Glucose Result: 6.0 % (06-30-22 @ 05:49)  A1C with Estimated Average Glucose Result: 6.6 % (04-24-22 @ 17:12)      Estimated Average Glucose: 126 mg/dL (06-30-22 @ 05:49)  Estimated Average Glucose: 143 mg/dL (04-24-22 @ 17:12)

## 2022-07-22 NOTE — PROGRESS NOTE ADULT - NS ATTEND AMEND GEN_ALL_CORE FT
clinically unchanged  creatinine stable around 1.5  immunosuppression belatacept , prednisone and MMF  continue physical therapy

## 2022-07-22 NOTE — PROGRESS NOTE ADULT - ATTENDING COMMENTS
Pt feeling better slowly.  Creatinine stable BUN increasing - send stool for occult blood.   Pt without diarrhea or epigastric pain but is on anticoagulation.   f/u dilantin dosing with neurology.   Cont belatacept, MMF 500mgs BID and prednisone.  Last dose belatacept on 7/18

## 2022-07-22 NOTE — PROGRESS NOTE ADULT - SUBJECTIVE AND OBJECTIVE BOX
[facility] [place] [facility]   MD NINA MASON MD, PA KRISTINE SOLTANPOUR, DO INJUNG KO, NP    TEL:  OFFICE: [phone]    From 5pm-7am Answering Service [phone]    -- RENAL FOLLOW UP NOTE ---Date of Service 07-22-22 @ 13:51    Patient is a 66y old  Male who presents with a chief complaint of Status Epilepticus (22 Jul 2022 11:15)      Patient seen and examined at bedside. No chest pain/sob    VITALS:  T(F): 97.7 (07-22-22 @ 12:51), Max: 98.7 (07-21-22 @ 17:00)  HR: 91 (07-22-22 @ 12:51)  BP: 128/81 (07-22-22 @ 12:51)  RR: 20 (07-22-22 @ 12:51)  SpO2: 98% (07-22-22 @ 12:51)  Wt(kg): --    07-21 @ 07:01  -  07-22 @ 07:00  --------------------------------------------------------  IN: 1560 mL / OUT: 2050 mL / NET: -490 mL          PHYSICAL EXAM:  Constitutional: NAD  Neck: No JVD  Respiratory: CTAB, no wheezes, rales or rhonchi  Cardiovascular: S1, S2, RRR  Gastrointestinal: BS+, soft, NT/ND  Extremities: No peripheral edema    Hospital Medications:   MEDICATIONS  (STANDING):  atorvastatin 40 milliGRAM(s) Oral at bedtime  carvedilol 12.5 milliGRAM(s) Oral every 12 hours  chlorhexidine 2% Cloths 1 Application(s) Topical <User Schedule>  doxazosin 4 milliGRAM(s) Oral <User Schedule>  enoxaparin Injectable 60 milliGRAM(s) SubCutaneous every 12 hours  epoetin cortney-epbx (RETACRIT) Injectable 40948 Unit(s) SubCutaneous every 7 days  finasteride 5 milliGRAM(s) Oral daily  hydrALAZINE 100 milliGRAM(s) Oral every 8 hours  insulin glargine Injectable (LANTUS) 6 Unit(s) SubCutaneous at bedtime  insulin lispro (ADMELOG) corrective regimen sliding scale   SubCutaneous three times a day with meals  insulin lispro (ADMELOG) corrective regimen sliding scale   SubCutaneous at bedtime  insulin lispro Injectable (ADMELOG) 4 Unit(s) SubCutaneous three times a day before meals  levothyroxine 50 MICROGram(s) Oral daily  melatonin 6 milliGRAM(s) Oral at bedtime  mycophenolate mofetil Suspension 500 milliGRAM(s) Oral <User Schedule>  NIFEdipine XL 60 milliGRAM(s) Oral two times a day  nystatin    Suspension 904167 Unit(s) Oral four times a day  pantoprazole    Tablet 40 milliGRAM(s) Oral daily  phenytoin   Capsule 100 milliGRAM(s) Oral every 8 hours  polyethylene glycol 3350 17 Gram(s) Oral daily  predniSONE  Solution 5 milliGRAM(s) Oral daily  trimethoprim   80 mG/sulfamethoxazole 400 mG 1 Tablet(s) Oral daily      LABS:  07-22    130<L>  |  99  |  49<H>  ----------------------------<  203<H>  4.1   |  21<L>  |  1.43<H>    Ca    8.4      22 Jul 2022 06:18  Phos  3.2     07-22  Mg     1.8     07-22    TPro  5.8<L>  /  Alb  2.4<L>  /  TBili  0.1<L>  /  DBili      /  AST  21  /  ALT  19  /  AlkPhos  109  07-22    Creatinine Trend: 1.43 <--, 1.37 <--, 1.50 <--, 1.41 <--, 1.34 <--, 1.26 <--, 1.21 <--    Albumin, Serum: 2.4 g/dL (07-22 @ 06:18)  Phosphorus Level, Serum: 3.2 mg/dL (07-22 @ 06:18)                              7.7    2.02  )-----------( 355      ( 22 Jul 2022 06:18 )             24.4     Urine Studies:  Urinalysis - [07-18-22 @ 10:17]      Color Light Yellow / Appearance Clear / SG 1.018 / pH 6.0      Gluc Negative / Ketone Negative  / Bili Negative / Urobili Negative       Blood Negative / Protein 30 mg/dL / Leuk Est Negative / Nitrite Negative      RBC 3 / WBC 2 / Hyaline 0 / Gran  / Sq Epi  / Non Sq Epi 1 / Bacteria Negative    Urine Creatinine 72      [07-18-22 @ 10:18]  Urine Protein 58      [07-18-22 @ 10:18]  Urine Sodium 40      [07-18-22 @ 10:18]  Urine Urea Nitrogen 585      [07-18-22 @ 10:18]  Urine Potassium 22      [07-18-22 @ 10:18]  Urine Osmolality 370      [07-18-22 @ 10:18]    Iron 17, TIBC 171, %sat 10      [05-02-22 @ 13:03]  Ferritin 1505      [05-02-22 @ 13:05]  PTH -- (Ca 9.2)      [02-05-22 @ 10:13]   294  HbA1c 6.0      [02-21-20 @ 08:53]  TSH 4.69      [07-06-22 @ 18:36]      Syphilis Screen (Treponema Pallidum Ab) Negative      [06-27-22 @ 02:00]    RADIOLOGY & ADDITIONAL STUDIES:

## 2022-07-23 NOTE — PROGRESS NOTE ADULT - SUBJECTIVE AND OBJECTIVE BOX
[facility] DIVISION OF KIDNEY DISEASES AND HYPERTENSION -- FOLLOW UP NOTE  --------------------------------------------------------------------------------  Chief Complaint:  seizures    24 hour events/subjective:  Pt feeling better.  Creatinine still high.       PAST HISTORY  --------------------------------------------------------------------------------  No significant changes to PMH, PSH, FHx, SHx, unless otherwise noted    ALLERGIES & MEDICATIONS  --------------------------------------------------------------------------------  Allergies    No Known Allergies    Intolerances      Standing Inpatient Medications  atorvastatin 40 milliGRAM(s) Oral at bedtime  carvedilol 12.5 milliGRAM(s) Oral every 12 hours  chlorhexidine 2% Cloths 1 Application(s) Topical <User Schedule>  doxazosin 4 milliGRAM(s) Oral <User Schedule>  enoxaparin Injectable 60 milliGRAM(s) SubCutaneous every 12 hours  epoetin cortney-epbx (RETACRIT) Injectable 47246 Unit(s) SubCutaneous every 7 days  finasteride 5 milliGRAM(s) Oral daily  hydrALAZINE 100 milliGRAM(s) Oral every 8 hours  insulin glargine Injectable (LANTUS) 6 Unit(s) SubCutaneous at bedtime  insulin lispro (ADMELOG) corrective regimen sliding scale   SubCutaneous three times a day with meals  insulin lispro (ADMELOG) corrective regimen sliding scale   SubCutaneous at bedtime  insulin lispro Injectable (ADMELOG) 4 Unit(s) SubCutaneous three times a day before meals  levothyroxine 50 MICROGram(s) Oral daily  melatonin 6 milliGRAM(s) Oral at bedtime  mycophenolate mofetil Suspension 500 milliGRAM(s) Oral <User Schedule>  NIFEdipine XL 60 milliGRAM(s) Oral two times a day  nystatin    Suspension 249959 Unit(s) Oral four times a day  pantoprazole    Tablet 40 milliGRAM(s) Oral daily  phenytoin   Capsule 100 milliGRAM(s) Oral every 8 hours  polyethylene glycol 3350 17 Gram(s) Oral daily  predniSONE  Solution 5 milliGRAM(s) Oral daily  trimethoprim   80 mG/sulfamethoxazole 400 mG 1 Tablet(s) Oral daily    PRN Inpatient Medications  acetaminophen     Tablet .. 650 milliGRAM(s) Oral every 6 hours PRN      REVIEW OF SYSTEMS  --------------------------------------------------------------------------------  Gen: + weakness  Skin: No rashes  Head/Eyes/Ears/Mouth: No headache;No sore throat  Respiratory: No dyspnea, cough,   CV: No chest pain, PND, orthopnea  GI: No abdominal pain, diarrhea, constipation, nausea, vomiting  Transplant: No pain  : No increased frequency, dysuria, hematuria, nocturia  MSK: weak legs  Neuro: No dizziness/lightheadedness, weakness, seizures, numbness, tingling  Psych: No significant nervousness, anxiety, stress, depression    All other systems were reviewed and are negative, except as noted.    VITALS/PHYSICAL EXAM  --------------------------------------------------------------------------------  T(C): 36.6 (07-23-22 @ 09:35), Max: 36.9 (07-22-22 @ 21:00)  HR: 66 (07-23-22 @ 09:35) (66 - 91)  BP: 154/70 (07-23-22 @ 09:35) (128/81 - 172/74)  RR: 20 (07-23-22 @ 09:35) (18 - 20)  SpO2: 98% (07-23-22 @ 09:35) (96% - 99%)  Wt(kg): --        07-22-22 @ 07:01  -  07-23-22 @ 07:00  --------------------------------------------------------  IN: 1760 mL / OUT: 1940 mL / NET: -180 mL      Physical Exam:  	Gen: appears chronically ill   	HEENT: PERRL, supple neck  	Pulm: CTA B/L  	CV: RRR, S1S2; no rub  	Back: No spinal or CVA tenderness; no sacral edema  	Abd: +BS, soft, nontender/nondistended                      Transplant: No tenderness, swelling  	: No suprapubic tenderness  	LE: trace LE edema  	Neuro: No focal deficits  	Psych: Normal affect and mood, AAO x 3  	Skin: Warm, without rashes      LABS/STUDIES  --------------------------------------------------------------------------------              7.8    1.92  >-----------<  359      [07-23-22 @ 07:07]              24.6     129  |  99  |  53  ----------------------------<  142      [07-23-22 @ 07:07]  4.4   |  18  |  1.50        Ca     8.5     [07-23-22 @ 07:07]      Mg     1.8     [07-23-22 @ 07:07]      Phos  3.0     [07-23-22 @ 07:07]    TPro  6.1  /  Alb  2.4  /  TBili  0.1  /  DBili  x   /  AST  45  /  ALT  28  /  AlkPhos  130  [07-23-22 @ 07:07]    PT/INR: PT 13.7 , INR 1.18       [07-23-22 @ 07:09]  PTT: 33.9       [07-23-22 @ 07:09]      Creatinine Trend:  SCr 1.50 [07-23 @ 07:07]  SCr 1.43 [07-22 @ 06:18]  SCr 1.37 [07-21 @ 06:20]  SCr 1.50 [07-20 @ 05:56]  SCr 1.41 [07-19 @ 06:03]              Urinalysis - [07-18-22 @ 10:17]      Color Light Yellow / Appearance Clear / SG 1.018 / pH 6.0      Gluc Negative / Ketone Negative  / Bili Negative / Urobili Negative       Blood Negative / Protein 30 mg/dL / Leuk Est Negative / Nitrite Negative      RBC 3 / WBC 2 / Hyaline 0 / Gran  / Sq Epi  / Non Sq Epi 1 / Bacteria Negative    Urine Creatinine 72      [07-18-22 @ 10:18]  Urine Protein 58      [07-18-22 @ 10:18]  Urine Sodium 40      [07-18-22 @ 10:18]  Urine Urea Nitrogen 585      [07-18-22 @ 10:18]  Urine Potassium 22      [07-18-22 @ 10:18]  Urine Osmolality 370      [07-18-22 @ 10:18]    Iron 17, TIBC 171, %sat 10      [05-02-22 @ 13:03]  Ferritin 1505      [05-02-22 @ 13:05]  PTH -- (Ca 9.2)      [02-05-22 @ 10:13]   294  HbA1c 6.0      [02-21-20 @ 08:53]  TSH 4.69      [07-06-22 @ 18:36]      Syphilis Screen (Treponema Pallidum Ab) Negative      [06-27-22 @ 02:00]

## 2022-07-23 NOTE — PROGRESS NOTE ADULT - ASSESSMENT
66 yr old man with h/o ESRD due to DM on HD since 2019, s/p DDRT from DCD donor on 4/21/22 complicated by DGF requiring HD until 4/29/22 now admitted with seizures/status epilepticus    1. S/p DCD DDRT on 4/21/22  - creatinine slowly uptrending to 1.5. Electrolytes and volume status fair. Transplant ureteric stent removed 7/12. Renal sonogram done on 7/19 showed no hydronephrosis. MARA was not detected on US. DSA sent on 7/19/22 were negative. Pt received IVF (500 cc NS) on 7/20/22. SCr today slightly higher at 1.5.       2. Immunosuppression - Envarsus discontinued for possible neurotoxicity, seizures, PRES. Continue Belatacept, last dose on 7/18. Next dose on 8/1/22.   Reduce MMF to 250mgs BID for progressive leukopenia.      Continue Prednisone 5mg po daily      Infection prophylaxis - continue bactrim ss daily    continue holding valcyte 450mg daily (CMV intermediate risk) for now given leucopenia,MV pcr negative.     3. Seizure d/o -admitted with status epilepticus .Corrected phenytoin level 3.7 (7/21). As per neurology to continue current dose of phenytoin.  Dose being adjusted as per levels. Neurology following.      4. HTN:  BP uncontrolled. On Hydralazine 100mg q8, Nifedipine 60mg po bid, Doxazosin 4mg PO BID. Continue Coreg 12.5 mg BID. Monitor BP.  Increase coreg to 25mgs BID.      5. BPH with urine retention - continue Proscar and doxazosin. Recent bladder scan shows minimal PVR. Monitor UOP.    6. Cytopenia -  likely med induced.  Cont epo.     7. Atrial fibrillation - was on Eliquis, now on therapeutic Lovenox due to drug interaction with fosphenytoin. On Lovenox for AC. Family comfortable with continuing with Lovenox for now rather than coumadin.     8. ESBL Klebsiella UTI - Completed ertapenem x 3 days total per transplant ID.    9. Hyponatremia: SNa slowly downtrending to 129 on 7/18. Urine studies reviewed. SNa stable at 130 today. Fluid restriction 1L. Monitor SNa. Avoid hypotonic fluids.     10. Anemia: Pt with stable low Hb.  Pt on lovenox. No occult Gi bleed. Continue epogen. Monitor H/H.

## 2022-07-23 NOTE — PROGRESS NOTE ADULT - ASSESSMENT
66M with h/o DM type II, HTN, CAD s/p CABG in 2020, Afib on Eliquis, CVA in 2019 due to Afib, Seizure d/o (last episode was on 4/8/22), h/o GI bleed in 2/2022 EGD with duodenal ulcer and ESRD on HD s/p R DDRT from [facility] donor on 4/21/22 complicated by DGF requiring HD until 4/29/22 now with good graft function who presented with status epilepticus in setting of UTI/antibiotics and sub-therapeutic valproic acid level     Seizure Disorder:  - MRI head 6/27 with less concerns for PRES, likely ischemic changes  - Remains seizure free  - Antiepileptic regimen per Neurology, on Phenytoin 100mg TID, no further adjustment required  - Keep Mag > 2    R DCD DDRT 4/21/22:  - DSA negative  - Cr ~1.4-1.5 over the past few days, doppler with good flow  - hyponatremia: 1L fluid restriction, DM diet as tolerated with aspiration precautions  - Continue Doxazosin/Proscar for BPH  - ESBL Klebsiella UTI (post stent removal): completed Ertapenem.   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 Vocal cords good. Reflux precautions. Voice rest. overall improving  - OOB with RN/PT  - Anemia: stable, no acute signs of bleedinga this time.  Continue Retacrit weekly  - Leukopenia: held valcyte, CMV negative. Will decrease MMF to 250BID    Immunosuppression:   - Belatacept: 6/23, 7/4, 4/18 (initial loading). Re-loaded 7/8 as there was a gap between doses 2/2 seizures.  - MMF 250BID as above  - continue Pred 5  - PPx: Valcyte HELD/PPI/nystatin (thrush)/Bactrim    DM  - on Lantus/Lispro, Endocrine following     AFib:  - Eliquis with ~40% less efficacy while on fosphenytoin.  On Lovenox 60BID. Plan to continue switch to Coumadin once discharged home from rehab. Plan discussed with neurology and family  - rate controlled    R IJ DVT  - On Lovenox 60BID    HTN:  - Nifedipine/Hydralazine/Cardura/Coreg. adjust prn    DISPO  - given hospital course and currently lab abnormalities, will monitor today and hold Acute Rehab d/c  - f/u with Dr. Andre Patterson, Epilepsy team upon d/c per family request  - f/u with Dr. Lincoln, Endocrine upon d/c

## 2022-07-23 NOTE — PROGRESS NOTE ADULT - SUBJECTIVE AND OBJECTIVE BOX
[facility] [place] [facility]OLOG[facility]ACT[facility]   MD NINA MASON PA MIJUNG SHIN NP     TEL:  OFFICE: [phone]  DR HSU CELL: [phone]  DR. MARQUEZ CELL: [phone]  MARILYNN MORELOS CELL: [phone]  NP Lillian Buchanan CELL: [phone]    From 5pm-7am Answering Service [phone]    -- RENAL FOLLOW UP NOTE ---Date of Service 07-23-22 @ 12:15    Patient is a 66y old  Male who presents with a chief complaint of Status Epilepticus (23 Jul 2022 11:16)      Patient seen and examined at bedside. No chest pain/sob    VITALS:  T(F): 97.9 (07-23-22 @ 09:35), Max: 98.5 (07-23-22 @ 01:00)  HR: 66 (07-23-22 @ 09:35)  BP: 154/70 (07-23-22 @ 09:35)  RR: 20 (07-23-22 @ 09:35)  SpO2: 98% (07-23-22 @ 09:35)  Wt(kg): --    07-22 @ 07:01  -  07-23 @ 07:00  --------------------------------------------------------  IN: 1760 mL / OUT: 1940 mL / NET: -180 mL          PHYSICAL EXAM:  Constitutional: NAD  Neck: No JVD  Respiratory: CTAB, no wheezes, rales or rhonchi  Cardiovascular: S1, S2, RRR  Gastrointestinal: BS+, soft, NT/ND  Extremities: No b/l LE edema    [facility] Medications:   MEDICATIONS  (STANDING):  atorvastatin 40 milliGRAM(s) Oral at bedtime  carvedilol 25 milliGRAM(s) Oral every 12 hours  chlorhexidine 2% Cloths 1 Application(s) Topical <User Schedule>  doxazosin 4 milliGRAM(s) Oral <User Schedule>  enoxaparin Injectable 60 milliGRAM(s) SubCutaneous every 12 hours  epoetin cortney-epbx (RETACRIT) Injectable 20425 Unit(s) SubCutaneous every 7 days  finasteride 5 milliGRAM(s) Oral daily  hydrALAZINE 100 milliGRAM(s) Oral every 8 hours  insulin glargine Injectable (LANTUS) 6 Unit(s) SubCutaneous at bedtime  insulin lispro (ADMELOG) corrective regimen sliding scale   SubCutaneous three times a day with meals  insulin lispro (ADMELOG) corrective regimen sliding scale   SubCutaneous at bedtime  insulin lispro Injectable (ADMELOG) 4 Unit(s) SubCutaneous three times a day before meals  levothyroxine 50 MICROGram(s) Oral daily  melatonin 6 milliGRAM(s) Oral at bedtime  mycophenolate mofetil 250 milliGRAM(s) Oral <User Schedule>  NIFEdipine XL 60 milliGRAM(s) Oral two times a day  nystatin    Suspension 726241 Unit(s) Oral four times a day  pantoprazole    Tablet 40 milliGRAM(s) Oral daily  phenytoin   Capsule 100 milliGRAM(s) Oral every 8 hours  polyethylene glycol 3350 17 Gram(s) Oral daily  predniSONE  Solution 5 milliGRAM(s) Oral daily  trimethoprim   80 mG/sulfamethoxazole 400 mG 1 Tablet(s) Oral daily      LABS:  07-23    129<L>  |  99  |  53<H>  ----------------------------<  142<H>  4.4   |  18<L>  |  1.50<H>    Ca    8.5      23 Jul 2022 07:07  Phos  3.0     07-23  Mg     1.8     07-23    TPro  6.1  /  Alb  2.4<L>  /  TBili  0.1<L>  /  DBili      /  AST  45<H>  /  ALT  28  /  AlkPhos  130<H>  07-23    Creatinine Trend: 1.50 <--, 1.43 <--, 1.37 <--, 1.50 <--, 1.41 <--, 1.34 <--, 1.26 <--    Albumin, Serum: 2.4 g/dL (07-23 @ 07:07)  Phosphorus Level, Serum: 3.0 mg/dL (07-23 @ 07:07)                              7.8    1.92  )-----------( 359      ( 23 Jul 2022 07:07 )             24.6     Urine Studies:  Urinalysis - [07-18-22 @ 10:17]      Color Light Yellow / Appearance Clear / SG 1.018 / pH 6.0      Gluc Negative / Ketone Negative  / Bili Negative / Urobili Negative       Blood Negative / Protein 30 mg/dL / Leuk Est Negative / Nitrite Negative      RBC 3 / WBC 2 / Hyaline 0 / Gran  / Sq Epi  / Non Sq Epi 1 / Bacteria Negative    Urine Creatinine 72      [07-18-22 @ 10:18]  Urine Protein 58      [07-18-22 @ 10:18]  Urine Sodium 40      [07-18-22 @ 10:18]  Urine Urea Nitrogen 585      [07-18-22 @ 10:18]  Urine Potassium 22      [07-18-22 @ 10:18]  Urine Osmolality 370      [07-18-22 @ 10:18]    Iron 17, TIBC 171, %sat 10      [05-02-22 @ 13:03]  Ferritin 1505      [05-02-22 @ 13:05]  PTH -- (Ca 9.2)      [02-05-22 @ 10:13]   294  HbA1c 6.0      [02-21-20 @ 08:53]  TSH 4.69      [07-06-22 @ 18:36]      Syphilis Screen (Treponema Pallidum Ab) Negative      [06-27-22 @ 02:00]    RADIOLOGY & ADDITIONAL STUDIES:

## 2022-07-23 NOTE — PROGRESS NOTE ADULT - SUBJECTIVE AND OBJECTIVE BOX
Transplant Surgery Multidisciplinary Progress Note   --------------------------------------------------------------  R DCD DDRT    4/21/22    Present: Patient seen and examined with multidisciplinary team including Transplant Surgeon: Dr. Tena, Nephrologist: WALDO López and unit RN during am rounds.  Disciplines not in attendance will be notified of the plan.     HPI: 66M with PMH: DM type II, HTN, CAD s/p CABG in 2020, AFib on Eliquis, CVA in 2019 due to Afib, Seizure d/o following CVA, last episode was on 4/8/22, h/o GIB in 2/2022 EGD with duodenal ulcer.  ESRD due to DM was on HD since 2019.     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  Donor was 58 , KDPI 82%, DCD, single vessels and ureter, HLA mismatch 1, 2, 2. No DSA, cPRA 0%. 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 Re-admitted in May for anemia in the setting of large perinephric hematoma s/p evacuation and repair of arterial anastomosis on 5/2/22. Intra operative biopsy showed no rejection, Creatinine ranging ~2mg/dL.    In Rehab:  He was recently dx with klebsiella UTI rx with ertapenem - then switched to Levaquin day #6.    He also had parainfluenza pneumonia. Was at rehab progressing well.      Hospital course:  - 6/10: transferred from rehab facility for seizure status epilepticus. Intubated for respiratory protection and transferred to MICU.  EEG showed no seizure activity. Neuro consulted.   - 6/11: Extubated. Found to have R pleural effusion. s/p Thoracentesis 1.3L. Eliquis changed to heparin drip.  Post procedure drop in H&H. 5u PRBC, 2 u FFP.   - 6/11 evening: Transferred to SICU from MICU for further care in setting of hypoxia, significant R pleural effusion, anemia and hemodynamic instability  - 6/11 Intubated at 9pm and sedated on Precedex.  CT placed, 600ml blood drained.  1L total overnight, started pressors  - 6/11 Received Protamine for PTT >100 (was on Heparin drip started for AF), 2 u FFP and 5u PRBC total  - 6/13 s/p VATS 2.2L old blood removed; second chest tube placed  - 6/18-19: chest tubes removed  - 6/21: ?PRES vs. chronic ischemic changes on MRI, off Envarsus, switched to Belatacept 6/23 with good graft function  - 6/25: Tx to SICU for refractory seizures, improved with IV antiepileptic regimen  - 7/10: Passed bedside S&S in SICU. Downgraded from SICU to floor.   - 7/11: OR-->URETERAL STENT REMOVED  - 7/15: ESBL Kleb UTI (50-90K), completed Ertapenem x 3 Days    Interval Events:  - Afebrile, mild HTN, rest of VSS  - no complaints, in good spirits, AAOx3  - working with PT with heavy assistance, improving mobility daily  - tolerating full PO  - good UO without diuretics    Immunosuppression:   Induction:  Thymoglobulin                                        Maintenance:  - Belatacept: 6/23, 7/4, 7/8, 7/18. Next dose 8/1  -  BID (leukopenia), Pred 5mg daily   - Ongoing monitoring for signs of rejection.    Potential Discharge date: pending clinical improvement   Education:  Medications  Plan of care:  See Below        MEDICATIONS  (STANDING):  atorvastatin 40 milliGRAM(s) Oral at bedtime  carvedilol 25 milliGRAM(s) Oral every 12 hours  chlorhexidine 2% Cloths 1 Application(s) Topical <User Schedule>  doxazosin 4 milliGRAM(s) Oral <User Schedule>  enoxaparin Injectable 60 milliGRAM(s) SubCutaneous every 12 hours  epoetin cortney-epbx (RETACRIT) Injectable 78028 Unit(s) SubCutaneous every 7 days  finasteride 5 milliGRAM(s) Oral daily  hydrALAZINE 100 milliGRAM(s) Oral every 8 hours  insulin glargine Injectable (LANTUS) 6 Unit(s) SubCutaneous at bedtime  insulin lispro (ADMELOG) corrective regimen sliding scale   SubCutaneous three times a day with meals  insulin lispro (ADMELOG) corrective regimen sliding scale   SubCutaneous at bedtime  insulin lispro Injectable (ADMELOG) 4 Unit(s) SubCutaneous three times a day before meals  levothyroxine 50 MICROGram(s) Oral daily  melatonin 6 milliGRAM(s) Oral at bedtime  mycophenolate mofetil 250 milliGRAM(s) Oral <User Schedule>  NIFEdipine XL 60 milliGRAM(s) Oral two times a day  nystatin    Suspension 685746 Unit(s) Oral four times a day  pantoprazole    Tablet 40 milliGRAM(s) Oral daily  phenytoin   Capsule 100 milliGRAM(s) Oral every 8 hours  polyethylene glycol 3350 17 Gram(s) Oral daily  predniSONE  Solution 5 milliGRAM(s) Oral daily  trimethoprim   80 mG/sulfamethoxazole 400 mG 1 Tablet(s) Oral daily    MEDICATIONS  (PRN):  acetaminophen     Tablet .. 650 milliGRAM(s) Oral every 6 hours PRN Mild Pain (1 - 3)        Vital Signs Last 24 Hrs  T(C): 36.6 (23 Jul 2022 09:35), Max: 36.9 (22 Jul 2022 21:00)  T(F): 97.9 (23 Jul 2022 09:35), Max: 98.5 (23 Jul 2022 01:00)  HR: 66 (23 Jul 2022 09:35) (66 - 91)  BP: 154/70 (23 Jul 2022 09:35) (128/81 - 172/74)  BP(mean): 106 (22 Jul 2022 21:00) (106 - 106)  RR: 20 (23 Jul 2022 09:35) (18 - 20)  SpO2: 98% (23 Jul 2022 09:35) (96% - 99%)    Parameters below as of 23 Jul 2022 09:35  Patient On (Oxygen Delivery Method): room air        I&O's Summary    22 Jul 2022 07:01  -  23 Jul 2022 07:00  --------------------------------------------------------  IN: 1760 mL / OUT: 1940 mL / NET: -180 mL                              7.8    1.92  )-----------( 359      ( 23 Jul 2022 07:07 )             24.6     07-23    129<L>  |  99  |  53<H>  ----------------------------<  142<H>  4.4   |  18<L>  |  1.50<H>    Ca    8.5      23 Jul 2022 07:07  Phos  3.0     07-23  Mg     1.8     07-23    TPro  6.1  /  Alb  2.4<L>  /  TBili  0.1<L>  /  DBili  x   /  AST  45<H>  /  ALT  28  /  AlkPhos  130<H>  07-23                        REVIEW OF SYSTEMS  ------------------------------------  Gen: +fatigue, No fevers/chills, +weakness--all improving  Skin: No rashes  Head/Eyes/Ears/Mouth: No headache; Normal hearing; Normal vision w/o blurriness; No sinus pain/discomfort, sore throat  Respiratory: No dyspnea, cough, wheezing, hemoptysis  CV: No chest pain, PND, orthopnea  GI: No abdominal pain, diarrhea, constipation, nausea, vomiting, melena, hematochezia  : No increased frequency, dysuria, hematuria, nocturia  MSK: No joint pain/swelling; no back pain; no edema  Neuro: No dizziness/lightheadedness,  seizures, numbness, tingling, +weakness  Heme: No easy bruising or bleeding  Endo: No heat/cold intolerance  Psych: No significant nervousness, anxiety, stress, depression  All other systems were reviewed and are negative, except as noted.    PHYSICAL EXAM:  Constitutional: Well developed / well nourished  Eyes: Anicteric, PERRLA  ENMT: nc/at, +oral thrush improving  Neck: supple  Respiratory: CTA   Cardiovascular: RRR  Gastrointestinal: Soft, non distended, NT, RLQ incision healed   Genitourinary: voiding spontaneously  Extremities: SCD's in place and working bilaterally, overall edema has improved  Vascular: Palpable dp pulses bilaterally  Neurological: AAOx3  Skin: no rashes, ulcerations or lesions  Musculoskeletal: Moving all extremities, global weakness  Psychiatric: calm, follows commands and interacts appropriately

## 2022-07-23 NOTE — PROGRESS NOTE ADULT - NUTRITIONAL ASSESSMENT
This patient has been assessed with a concern for Malnutrition and has been determined to have a diagnosis/diagnoses of Severe protein-calorie malnutrition.    This patient is being managed with:   Diet Consistent Carbohydrate w/Evening Snack-  1000mL Fluid Restriction ([id number])  Supplement Feeding Modality:  Oral  Nepro Cans or Servings Per Day:  2       Frequency:  Daily  Entered: Jul 23 2022 11:13AM

## 2022-07-24 NOTE — PROGRESS NOTE ADULT - NUTRITIONAL ASSESSMENT
This patient has been assessed with a concern for Malnutrition and has been determined to have a diagnosis/diagnoses of Severe protein-calorie malnutrition.    This patient is being managed with:   Diet Consistent Carbohydrate w/Evening Snack-  1000mL Fluid Restriction ([id number])  Supplement Feeding Modality:  Oral  Nepro Cans or Servings Per Day:  2       Frequency:  Daily  Entered: Jul 23 2022 11:13AM

## 2022-07-24 NOTE — PROGRESS NOTE ADULT - SUBJECTIVE AND OBJECTIVE BOX
Transplant Surgery Multidisciplinary Progress Note   --------------------------------------------------------------  R DCD DDRT    4/21/22    Present: Patient seen and examined with multidisciplinary team including Transplant Surgeon: Dr. Tena, Nephrologist: Dr. Waqar Lomeli, WALDO Esparza and unit RN during am rounds.  Disciplines not in attendance will be notified of the plan.     HPI: 66M with PMH: DM type II, HTN, CAD s/p CABG in 2020, AFib on Eliquis, CVA in 2019 due to Afib, Seizure d/o following CVA, last episode was on 4/8/22, h/o GIB in 2/2022 EGD with duodenal ulcer.  ESRD due to DM was on HD since 2019.     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  Donor was 58 , KDPI 82%, DCD, single vessels and ureter, HLA mismatch 1, 2, 2. No DSA, cPRA 0%. 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 Re-admitted in May for anemia in the setting of large perinephric hematoma s/p evacuation and repair of arterial anastomosis on 5/2/22. Intra operative biopsy showed no rejection, Creatinine ranging ~2mg/dL.    In Rehab:  He was recently dx with klebsiella UTI rx with ertapenem - then switched to Levaquin day #6.    He also had parainfluenza pneumonia. Was at rehab progressing well.      Hospital course:  - 6/10: transferred from rehab facility for seizure status epilepticus. Intubated for respiratory protection and transferred to MICU.  EEG showed no seizure activity. Neuro consulted.   - 6/11: Extubated. Found to have R pleural effusion. s/p Thoracentesis 1.3L. Eliquis changed to heparin drip.  Post procedure drop in H&H. 5u PRBC, 2 u FFP.   - 6/11 evening: Transferred to SICU from MICU for further care in setting of hypoxia, significant R pleural effusion, anemia and hemodynamic instability  - 6/11 Intubated at 9pm and sedated on Precedex.  CT placed, 600ml blood drained.  1L total overnight, started pressors  - 6/11 Received Protamine for PTT >100 (was on Heparin drip started for AF), 2 u FFP and 5u PRBC total  - 6/13 s/p VATS 2.2L old blood removed; second chest tube placed  - 6/18-19: chest tubes removed  - 6/21: ?PRES vs. chronic ischemic changes on MRI, off Envarsus, switched to Belatacept 6/23 with good graft function  - 6/25: Tx to SICU for refractory seizures, improved with IV antiepileptic regimen  - 7/10: Passed bedside S&S in SICU. Downgraded from SICU to floor.   - 7/11: OR-->URETERAL STENT REMOVED  - 7/15: ESBL Kleb UTI (50-90K), completed Ertapenem x 3 Days    Interval Events:  - no acute overnight events  - MMF held for leukopenia  - received Neupogen x 1  - NA better 132  - Incr coreg 25mg bid    Immunosuppression:   Induction:  Thymoglobulin                                        Maintenance:  - Belatacept: 6/23, 7/4, 7/8, 7/18. Next dose 8/1  -  BID (leukopenia), Pred 5mg daily   - Ongoing monitoring for signs of rejection.    Potential Discharge date: pending clinical improvement   Education:  Medications  Plan of care:  See Below    MEDICATIONS  (STANDING):  atorvastatin 40 milliGRAM(s) Oral at bedtime  carvedilol 25 milliGRAM(s) Oral every 12 hours  chlorhexidine 2% Cloths 1 Application(s) Topical <User Schedule>  doxazosin 4 milliGRAM(s) Oral <User Schedule>  enoxaparin Injectable 60 milliGRAM(s) SubCutaneous every 12 hours  epoetin cortney-epbx (RETACRIT) Injectable 44980 Unit(s) SubCutaneous every 7 days  finasteride 5 milliGRAM(s) Oral daily  hydrALAZINE 100 milliGRAM(s) Oral every 8 hours  insulin glargine Injectable (LANTUS) 5 Unit(s) SubCutaneous at bedtime  insulin lispro (ADMELOG) corrective regimen sliding scale   SubCutaneous three times a day with meals  insulin lispro (ADMELOG) corrective regimen sliding scale   SubCutaneous at bedtime  insulin lispro Injectable (ADMELOG) 4 Unit(s) SubCutaneous three times a day before meals  levothyroxine 50 MICROGram(s) Oral daily  melatonin 6 milliGRAM(s) Oral at bedtime  NIFEdipine XL 60 milliGRAM(s) Oral two times a day  nystatin    Suspension 333039 Unit(s) Oral four times a day  pantoprazole    Tablet 40 milliGRAM(s) Oral daily  phenytoin   Capsule 100 milliGRAM(s) Oral every 8 hours  polyethylene glycol 3350 17 Gram(s) Oral daily  predniSONE  Solution 5 milliGRAM(s) Oral daily  trimethoprim   80 mG/sulfamethoxazole 400 mG 1 Tablet(s) Oral daily    MEDICATIONS  (PRN):  acetaminophen     Tablet .. 650 milliGRAM(s) Oral every 6 hours PRN Mild Pain (1 - 3)      PAST MEDICAL & SURGICAL HISTORY:  Hypertension  Diabetes  Dyslipidemia  CAD (Coronary Artery Disease) with Stents in 06/2009 and 6/2019, s/p off-pump C3L on 8/13/20  Hypothyroidism  CVA (cerebral vascular accident) 12/13/19 with residual bilateral weakness  Anemia  PEG (percutaneous endoscopic gastrostomy) removed July 2020  ESRD on dialysis M/W/F  Seizures after CVA, last seizure was last week 4/07/22  History of insertion of stent into coronary artery bypass graft 2009 and 2019  S/P CABG x 3 off pump C3L on 8/13/20    Vital Signs Last 24 Hrs  T(C): 36.8 (24 Jul 2022 09:00), Max: 37.2 (23 Jul 2022 20:54)  T(F): 98.2 (24 Jul 2022 09:00), Max: 99 (24 Jul 2022 05:09)  HR: 61 (24 Jul 2022 09:00) (61 - 71)  BP: 154/68 (24 Jul 2022 09:00) (133/66 - 160/72)  BP(mean): 99 (24 Jul 2022 05:09) (99 - 103)  RR: 18 (24 Jul 2022 09:00) (18 - 19)  SpO2: 99% (24 Jul 2022 09:00) (97% - 100%)    I&O's Summary    23 Jul 2022 07:01  -  24 Jul 2022 07:00  --------------------------------------------------------  IN: 720 mL / OUT: 1500 mL / NET: -780 mL    24 Jul 2022 07:01  -  24 Jul 2022 10:30  --------------------------------------------------------  IN: 0 mL / OUT: 200 mL / NET: -200 mL                          7.7    2.71  )-----------( 399      ( 24 Jul 2022 07:05 )             24.1     07-24    132<L>  |  101  |  59<H>  ----------------------------<  102<H>  4.5   |  20<L>  |  1.80<H>    Ca    8.6      24 Jul 2022 07:07  Phos  3.3     07-24  Mg     2.0     07-24    TPro  6.2  /  Alb  2.6<L>  /  TBili  0.2  /  DBili  x   /  AST  28  /  ALT  26  /  AlkPhos  132<H>  07-24    REVIEW OF SYSTEMS  ------------------------------------  Gen: +fatigue, No fevers/chills, +weakness--all improving  Skin: No rashes  Head/Eyes/Ears/Mouth: No headache; Normal hearing; Normal vision w/o blurriness; No sinus pain/discomfort, sore throat  Respiratory: No dyspnea, cough, wheezing, hemoptysis  CV: No chest pain, PND, orthopnea  GI: No abdominal pain, diarrhea, constipation, nausea, vomiting, melena, hematochezia  : No increased frequency, dysuria, hematuria, nocturia  MSK: No joint pain/swelling; no back pain; no edema  Neuro: No dizziness/lightheadedness,  seizures, numbness, tingling, +weakness  Heme: No easy bruising or bleeding  Endo: No heat/cold intolerance  Psych: No significant nervousness, anxiety, stress, depression  All other systems were reviewed and are negative, except as noted.    PHYSICAL EXAM:  Constitutional: Well developed / well nourished  Eyes: Anicteric, PERRLA  ENMT: nc/at, +oral thrush improving  Neck: supple  Respiratory: CTA   Cardiovascular: RRR  Gastrointestinal: Soft, non distended, NT, RLQ incision healed   Genitourinary: voiding spontaneously  Extremities: SCD's in place and working bilaterally, overall edema has improved  Vascular: Palpable dp pulses bilaterally  Neurological: AAOx3  Skin: no rashes, ulcerations or lesions  Musculoskeletal: Moving all extremities, global weakness  Psychiatric: calm, follows commands and interacts appropriately

## 2022-07-24 NOTE — PROGRESS NOTE ADULT - ASSESSMENT
66M with h/o DM type II, HTN, CAD s/p CABG in 2020, Afib on Eliquis, CVA in 2019 due to Afib, Seizure d/o (last episode was on 4/8/22), h/o GI bleed in 2/2022 EGD with duodenal ulcer and ESRD on HD s/p R DDRT from DCD donor on 4/21/22 complicated by DGF requiring HD until 4/29/22 now with good graft function who presented with status epilepticus in setting of UTI/antibiotics and sub-therapeutic valproic acid level     Seizure Disorder:  - MRI head 6/27 with less concerns for PRES, likely ischemic changes  - Remains seizure free  - Antiepileptic regimen per Neurology, on Phenytoin 100mg TID, no further adjustment required  - Keep Mag > 2    R DCD DDRT 4/21/22:  - Uptrending SCr  - DM diet as tolerated with aspiration precautions  - Continue Doxazosin/Proscar for BPH  - ESBL Klebsiella UTI (post stent removal): completed Ertapenem.   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 Vocal cords good. Reflux precautions. Voice rest. overall improving  - OOB with RN/PT  - Anemia: stable, no acute signs of bleedinga this time.  Continue Retacrit weekly  - Leukopenia: held valcyte, CMV negative. Held MMF     Immunosuppression:   - Belatacept: 6/23, 7/4, 4/18 (initial loading). Re-loaded 7/8 as there was a gap between doses 2/2 seizures.  - MMF held  - continue Pred 5  - PPx: Valcyte HELD/PPI/nystatin (thrush)/Bactrim    DM  - on Lantus/Lispro, Endocrine following     AFib:  - Eliquis with ~40% less efficacy while on fosphenytoin.  On Lovenox 60BID. Plan to continue switch to Coumadin once discharged home from rehab. Plan discussed with neurology and family  - rate controlled    R IJ DVT  - On Lovenox 60BID    HTN:  - Nifedipine/Hydralazine/Cardura/Coreg. adjust prn    DISPO  - given hospital course and currently lab abnormalities, will monitor today and hold Acute Rehab d/c  - f/u with Dr. Andre Patterson, Epilepsy team upon d/c per family request  - f/u with Dr. Lincoln, Endocrine upon d/c     66M with h/o DM type II, HTN, CAD s/p CABG in 2020, Afib on Eliquis, CVA in 2019 due to Afib, Seizure d/o (last episode was on 4/8/22), h/o GI bleed in 2/2022 EGD with duodenal ulcer and ESRD on HD s/p R DDRT from DCD donor on 4/21/22 complicated by DGF requiring HD until 4/29/22 now with good graft function who presented with status epilepticus in setting of UTI/antibiotics and sub-therapeutic valproic acid level     Seizure Disorder:  - MRI head 6/27 with less concerns for PRES, likely ischemic changes  - Remains seizure free  - Antiepileptic regimen per Neurology, on Phenytoin 100mg TID, no further adjustment required  - Keep Mag > 2    R DCD DDRT 4/21/22:  - Uptrending SCr, NS 500cc bolus today, repeat BMP this afternoon  - DM diet as tolerated with aspiration precautions  - Continue Doxazosin/Proscar for BPH  - ESBL Klebsiella UTI (post stent removal): completed Ertapenem.   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 Vocal cords good. Reflux precautions. Voice rest. overall improving  - OOB with RN/PT  - Anemia: stable, no acute signs of bleedinga this time.  Continue Retacrit weekly  - Leukopenia: held valcyte, CMV negative. Held MMF     Immunosuppression:   - Belatacept: 6/23, 7/4, 4/18 (initial loading). Re-loaded 7/8 as there was a gap between doses 2/2 seizures.  - MMF held  - continue Pred 5  - PPx: Valcyte HELD/PPI/nystatin (thrush)/Bactrim    DM  - on Lantus/Lispro, Endocrine following     AFib:  - Eliquis with ~40% less efficacy while on fosphenytoin.  On Lovenox 60BID. Plan to continue switch to Coumadin once discharged home from rehab. Plan discussed with neurology and family  - rate controlled    R IJ DVT  - On Lovenox 60BID    HTN:  - Nifedipine/Hydralazine/Cardura/Coreg. adjust prn    DISPO  - given hospital course and currently lab abnormalities, will monitor today and hold Acute Rehab d/c  - f/u with Dr. Andre Patterson, Epilepsy team upon d/c per family request  - f/u with Dr. Lincoln, Endocrine upon d/c

## 2022-07-24 NOTE — CHART NOTE - NSCHARTNOTEFT_GEN_A_CORE
Called regarding patient's elevated liver enzymes and possibility this could be attributed to phenytoin. Liver enzymes had been wnl for duration of admission, including this AM. However significant rise noted on repeat tests this evening.   Most recent phenytoin level subtherapeutic on 7/21: 2.9 (corrected 3.7), with no further seizures noted.    Recommendations:  [] Trend LFTs and continue workup for underlying causes   [] Recheck phenytoin level  [] Continue current phenytoin regimen for now      Discussed with Epilepsy fellow, Dr. Stokes. Discussed with primary team.   Please call [facility] ([phone]) with any questions.

## 2022-07-24 NOTE — PROGRESS NOTE ADULT - ASSESSMENT
66 yr old Male with PMHx ESRD s/p permacath removal 5/10/22 s/p Rt DDRT 4/21/22,  CVA (2019), Afib on apixaban, seizure activity, CAD s/p stents, CABG (2020), HTN, HLD, DM on insulin, gastric/duodenal ulcer, recent hospitalization 4/28/22 -5/10/22 with weakness/anemia found to have perinephric hematoma requiring evacuation and repair of bleeding arterial anastomosis. Curently being treated for UTI with Levaquin Today after developing multiple sz episodes refractory to 5 mg versed IM (EMS) and 2 mg ativan IV in E.D. concerning for status epilepticus requiring intubation for hypoxic respiratory  failure. MICU Consult was called for hypoxic respiratory failure secondary to status epilepticus.  Nephrology consulted for renal failure.     A/P  DDRT on 04/21/22  Course complicated by DGF, was on HD until 4/29/22. Readmitted in May for anemia in the setting of large perinephric hematoma s/p evacuation and repair of arterial anastomosis on 5/2/22. Intra operative biopsy showed no rejection.  ANA was initially sec to  hemodynamic change   stent removal 07/12/22  scr elevated slightly today- suggests to give fluid  transplant team on board   allograft US, DSA negative   monitor BMP, U/O     HYponatremia   avoid hypotonic solution   optimize glucose   monitor BMP closely     History of Hyperkalemia  Secondary to Bactrim now resolved off Bactrim.    HTN   optimal   manage by transplant team    monitor BP closely     UTI  Urine culture with ESBL E.coli.  cefepime changed to ertapenem.     Immunosuppression per transplant team  Induction:  Thymoglobulin           Envarsus discontinued for possible neurotoxicity. Continue     Belatacept, next dose on 7/18      MMF 500mg po BID decreased because of cytopenia and UTI      Prednisone 5mg po daily       For PCP prophylaxis,   Transplant team changed atovaquone to bactrim as hyperkalemia has resolved,     CMV prophylaxis.   continue Valcyte 450mg po daily (CMV intermediate risk)

## 2022-07-24 NOTE — CHART NOTE - NSCHARTNOTEFT_GEN_A_CORE
Age: 66y    Gender: Male    POCT Blood Glucose:  125 mg/dL (07-24-22 @ 08:46)  180 mg/dL (07-23-22 @ 21:09)  223 mg/dL (07-23-22 @ 17:27)  170 mg/dL (07-23-22 @ 12:55)      eMAR:  atorvastatin   40 milliGRAM(s) Oral (07-23-22 @ 21:29)    finasteride   5 milliGRAM(s) Oral (07-23-22 @ 13:17)    insulin glargine Injectable (LANTUS)   6 Unit(s) SubCutaneous (07-23-22 @ 21:28)    insulin lispro (ADMELOG) corrective regimen sliding scale   2 Unit(s) SubCutaneous (07-23-22 @ 18:06)   1 Unit(s) SubCutaneous (07-23-22 @ 13:16)    insulin lispro Injectable (ADMELOG)   4 Unit(s) SubCutaneous (07-24-22 @ 09:08)   4 Unit(s) SubCutaneous (07-23-22 @ 18:06)   4 Unit(s) SubCutaneous (07-23-22 @ 13:15)    levothyroxine   50 MICROGram(s) Oral (07-24-22 @ 05:32)    predniSONE  Solution   5 milliGRAM(s) Oral (07-24-22 @ 05:34)       POC glucose, insulin requirements, lab values reviewed. noted Cr uptrending to 1.8 today from 1.5 previously with tightly controlled FBG, recommend lowering lantus to 5 units; c/w admelog 4 units for now, adjust further based on glycemic trends today , c/w LDSS TID ac and LDSS qHS BG Goal 100-180mg/dl\    d/w Transplant Theresa MUNOZ

## 2022-07-24 NOTE — PROGRESS NOTE ADULT - SUBJECTIVE AND OBJECTIVE BOX
[facility] [place] [facility]   MD NINA MASON PA MIJUNG SHIN NP     TEL:  OFFICE: [phone]    From 5pm-7am Answering Service [phone]    -- RENAL FOLLOW UP NOTE ---Date of Service 07-24-22 @ 13:18    Patient is a 66y old  Male who presents with a chief complaint of Status Epilepticus (24 Jul 2022 10:27)      Patient seen and examined at bedside. No chest pain/sob    VITALS:  T(F): 99 (07-24-22 @ 13:00), Max: 99 (07-24-22 @ 05:09)  HR: 79 (07-24-22 @ 13:00)  BP: 141/62 (07-24-22 @ 13:00)  RR: 18 (07-24-22 @ 13:00)  SpO2: 99% (07-24-22 @ 13:00)  Wt(kg): --    07-23 @ 07:01  -  07-24 @ 07:00  --------------------------------------------------------  IN: 720 mL / OUT: 1500 mL / NET: -780 mL    07-24 @ 07:01  -  07-24 @ 13:18  --------------------------------------------------------  IN: 240 mL / OUT: 200 mL / NET: 40 mL          PHYSICAL EXAM:  Constitutional: NAD  Neck: No JVD  Respiratory: CTAB, no wheezes, rales or rhonchi  Cardiovascular: S1, S2, RRR  Gastrointestinal: BS+, soft, NT/ND  Extremities: No b/l LE edema      Hospital Medications:   MEDICATIONS  (STANDING):  atorvastatin 40 milliGRAM(s) Oral at bedtime  carvedilol 25 milliGRAM(s) Oral every 12 hours  chlorhexidine 2% Cloths 1 Application(s) Topical <User Schedule>  doxazosin 4 milliGRAM(s) Oral <User Schedule>  enoxaparin Injectable 60 milliGRAM(s) SubCutaneous every 12 hours  epoetin cortney-epbx (RETACRIT) Injectable 27354 Unit(s) SubCutaneous every 7 days  finasteride 5 milliGRAM(s) Oral daily  hydrALAZINE 100 milliGRAM(s) Oral every 8 hours  insulin glargine Injectable (LANTUS) 5 Unit(s) SubCutaneous at bedtime  insulin lispro (ADMELOG) corrective regimen sliding scale   SubCutaneous three times a day with meals  insulin lispro (ADMELOG) corrective regimen sliding scale   SubCutaneous at bedtime  insulin lispro Injectable (ADMELOG) 4 Unit(s) SubCutaneous three times a day before meals  levothyroxine 50 MICROGram(s) Oral daily  melatonin 6 milliGRAM(s) Oral at bedtime  NIFEdipine XL 60 milliGRAM(s) Oral two times a day  nystatin    Suspension 372620 Unit(s) Oral four times a day  pantoprazole    Tablet 40 milliGRAM(s) Oral daily  phenytoin   Capsule 100 milliGRAM(s) Oral every 8 hours  polyethylene glycol 3350 17 Gram(s) Oral daily  predniSONE  Solution 5 milliGRAM(s) Oral daily  sodium chloride 0.9% Bolus 500 milliLiter(s) IV Bolus once  trimethoprim   80 mG/sulfamethoxazole 400 mG 1 Tablet(s) Oral daily      LABS:  07-24    132<L>  |  101  |  59<H>  ----------------------------<  102<H>  4.5   |  20<L>  |  1.80<H>    Ca    8.6      24 Jul 2022 07:07  Phos  3.3     07-24  Mg     2.0     07-24    TPro  6.2  /  Alb  2.6<L>  /  TBili  0.2  /  DBili      /  AST  28  /  ALT  26  /  AlkPhos  132<H>  07-24    Creatinine Trend: 1.80 <--, 1.50 <--, 1.43 <--, 1.37 <--, 1.50 <--, 1.41 <--, 1.34 <--    Albumin, Serum: 2.6 g/dL (07-24 @ 07:07)  Phosphorus Level, Serum: 3.3 mg/dL (07-24 @ 07:07)                              7.7    2.71  )-----------( 399      ( 24 Jul 2022 07:05 )             24.1     Urine Studies:  Urinalysis - [07-18-22 @ 10:17]      Color Light Yellow / Appearance Clear / SG 1.018 / pH 6.0      Gluc Negative / Ketone Negative  / Bili Negative / Urobili Negative       Blood Negative / Protein 30 mg/dL / Leuk Est Negative / Nitrite Negative      RBC 3 / WBC 2 / Hyaline 0 / Gran  / Sq Epi  / Non Sq Epi 1 / Bacteria Negative    Urine Creatinine 72      [07-18-22 @ 10:18]  Urine Protein 58      [07-18-22 @ 10:18]  Urine Sodium 40      [07-18-22 @ 10:18]  Urine Urea Nitrogen 585      [07-18-22 @ 10:18]  Urine Potassium 22      [07-18-22 @ 10:18]  Urine Osmolality 370      [07-18-22 @ 10:18]    Iron 17, TIBC 171, %sat 10      [05-02-22 @ 13:03]  Ferritin 1505      [05-02-22 @ 13:05]  PTH -- (Ca 9.2)      [02-05-22 @ 10:13]   294  HbA1c 6.0      [02-21-20 @ 08:53]  TSH 4.69      [07-06-22 @ 18:36]      Syphilis Screen (Treponema Pallidum Ab) Negative      [06-27-22 @ 02:00]    RADIOLOGY & ADDITIONAL STUDIES:

## 2022-07-25 NOTE — CONSULT NOTE ADULT - SUBJECTIVE AND OBJECTIVE BOX
HPI:  66 yr old Male with PMHx ESRD s/p permacath removal 5/10/22 s/p Rt DDRT 22,  CVA (), Afib on apixaban, seizure activity, CAD s/p stents, CABG (), HTN, HLD, DM on insulin, gastric/duodenal ulcer, recent hospitalization 22 -5/10/22 with weakness/anemia found to have perinephric hematoma requiring evacuation and repair of bleeding arterial anastomosis presenting with status epilepticus requiring intubation for hypoxic respiratory  failure. Hospital course complicated with Pleural effusion requiring thoracentesis, hemothorax and hemorrhagic shock following right thoracentesis done on 6/10 for effusion s/p chest tube, followed by VATS, ESBL UTI tx with Ertapenem, and fluctuating Cr with increase in liver enzymes for which Transplant Hepatology was consulted.     At [facility] patient with no major complain. Denies having any previous liver disease. Denies any herbal medication.     Allergies:  No Known Allergies        Hospital Medications:  acetaminophen     Tablet .. 650 milliGRAM(s) Oral every 6 hours PRN  carvedilol 25 milliGRAM(s) Oral every 12 hours  chlorhexidine 2% Cloths 1 Application(s) Topical <User Schedule>  doxazosin 4 milliGRAM(s) Oral <User Schedule>  enoxaparin Injectable 60 milliGRAM(s) SubCutaneous <User Schedule>  epoetin cortney-epbx (RETACRIT) Injectable 10229 Unit(s) SubCutaneous every 7 days  finasteride 5 milliGRAM(s) Oral daily  insulin glargine Injectable (LANTUS) 5 Unit(s) SubCutaneous at bedtime  insulin lispro (ADMELOG) corrective regimen sliding scale   SubCutaneous three times a day with meals  insulin lispro (ADMELOG) corrective regimen sliding scale   SubCutaneous at bedtime  insulin lispro Injectable (ADMELOG) 4 Unit(s) SubCutaneous three times a day before meals  levothyroxine 50 MICROGram(s) Oral daily  melatonin 6 milliGRAM(s) Oral at bedtime  meropenem  IVPB      NIFEdipine XL 60 milliGRAM(s) Oral two times a day  nystatin    Suspension 955074 Unit(s) Oral four times a day  pantoprazole    Tablet 40 milliGRAM(s) Oral daily  phenytoin   Capsule 100 milliGRAM(s) Oral every 8 hours  polyethylene glycol 3350 17 Gram(s) Oral daily  predniSONE  Solution 5 milliGRAM(s) Oral daily  sodium bicarbonate 1300 milliGRAM(s) Oral <User Schedule>  vancomycin  IVPB 1000 milliGRAM(s) IV Intermittent once      PMHX/PSHX:  Hypertension    Diabetes    Dyslipidemia    CAD (Coronary Artery Disease)    CAD (Coronary Artery Disease)    CRI (Chronic Renal Insufficiency)    Hypothyroidism    CVA (cerebral vascular accident)    Anemia    PEG (percutaneous endoscopic gastrostomy) status    Intubation of airway performed without difficulty    ESRD on dialysis    Seizures    No significant past surgical history    History of insertion of stent into coronary artery bypass graft    S/P CABG x 3        Family history:  No pertinent family history in first degree relatives    No pertinent family history in first degree relatives    No pertinent family history in first degree relatives    FH: HTN (hypertension)    No pertinent family history in first degree relatives    Family history of cancer of tongue (Father)        Social History: no smoking    ROS:   General:  No fevers, chills or night sweats  ENT:  No sore throat or dysphagia  CV:  No pain or palpitations  Resp:  No dyspnea, cough or  wheezing  GI:  as above  Skin:  No rash or edema  Neuro: no weakness   Hematologic: no bleeding  Musculoskeletal: no muscle pain or join pain  Psych: no agitation     : no dysuria      PHYSICAL EXAM:   GENERAL:  NAD, Appears stated age  HEENT:  NC/AT,  conjunctivae clear and pink, sclera -anicteric  CHEST:  CTA B/L, Normal effort  HEART:  RRR S1/S2,  ABDOMEN:  Soft, non-tender, non-distended,  surgical scar  EXTREMITIES:  No cyanosis or Edema  SKIN:  Warm & Dry. No rash or erythema  NEURO:  Alert, oriented, no focal deficit    Vital Signs:  Vital Signs Last 24 Hrs  T(C): 38.3 (2022 13:00), Max: 38.3 (2022 13:00)  T(F): 101 (2022 13:00), Max: 101 (2022 13:00)  HR: 61 (2022 14:00) (61 - 83)  BP: 126/62 (2022 14:00) (118/55 - 164/70)  BP(mean): 89 (2022 14:00) (82 - 98)  RR: 20 (2022 14:00) (18 - 21)  SpO2: 98% (2022 14:00) (97% - 99%)    Parameters below as of 2022 13:00  Patient On (Oxygen Delivery Method): room air      Daily     Daily Weight in k.5 (2022 05:09)    LABS:                        7.8    2.94  )-----------( 391      ( 2022 06:29 )             24.5     Mean Cell Volume: 96.1 fl (22 @ 06:29)        128<L>  |  98  |  81<H>  ----------------------------<  180<H>  5.3   |  15<L>  |  2.22<H>    Ca    8.7      2022 11:51  Phos  3.9       Mg     2.0         TPro  6.8  /  Alb  3.0<L>  /  TBili  0.3  /  DBili  x   /  AST  555<H>  /  ALT  580<H>  /  AlkPhos  386<H>      LIVER FUNCTIONS - ( 2022 11:51 )  Alb: 3.0 g/dL / Pro: 6.8 g/dL / ALK PHOS: 386 U/L / ALT: 580 U/L / AST: 555 U/L / GGT: x           PT/INR - ( 2022 06:30 )   PT: 16.6 sec;   INR: 1.44 ratio         PTT - ( 2022 06:30 )  PTT:40.7 sec                            7.8    2.94  )-----------( 391      ( 2022 06:29 )             24.5                         7.7    2.71  )-----------( 399      ( 2022 07:05 )             24.1                         7.8    1.92  )-----------( 359      ( 2022 07:07 )             24.6     Imaging:     HPI:  66 yr old Male with PMHx ESRD s/p permacath removal 5/10/22 s/p Rt DDRT 22,  CVA (), Afib on apixaban, seizure activity, CAD s/p stents, CABG (), HTN, HLD, DM on insulin, gastric/duodenal ulcer, recent hospitalization 22 -5/10/22 with weakness/anemia found to have perinephric hematoma requiring evacuation and repair of bleeding arterial anastomosis presenting with status epilepticus requiring intubation for hypoxic respiratory  failure. Hospital course complicated with Pleural effusion requiring thoracentesis, hemothorax and hemorrhagic shock following right thoracentesis done on 6/10 for effusion s/p chest tube, followed by VATS, ESBL UTI tx with Ertapenem, and fluctuating Cr with increase in liver enzymes for which Transplant Hepatology was consulted.     At [facility] patient with no major complain. Denies having any previous liver disease. Denies any herbal medication.     Allergies:  No Known Allergies        Hospital Medications:  acetaminophen     Tablet .. 650 milliGRAM(s) Oral every 6 hours PRN  carvedilol 25 milliGRAM(s) Oral every 12 hours  chlorhexidine 2% Cloths 1 Application(s) Topical <User Schedule>  doxazosin 4 milliGRAM(s) Oral <User Schedule>  enoxaparin Injectable 60 milliGRAM(s) SubCutaneous <User Schedule>  epoetin cortney-epbx (RETACRIT) Injectable 71814 Unit(s) SubCutaneous every 7 days  finasteride 5 milliGRAM(s) Oral daily  insulin glargine Injectable (LANTUS) 5 Unit(s) SubCutaneous at bedtime  insulin lispro (ADMELOG) corrective regimen sliding scale   SubCutaneous three times a day with meals  insulin lispro (ADMELOG) corrective regimen sliding scale   SubCutaneous at bedtime  insulin lispro Injectable (ADMELOG) 4 Unit(s) SubCutaneous three times a day before meals  levothyroxine 50 MICROGram(s) Oral daily  melatonin 6 milliGRAM(s) Oral at bedtime  meropenem  IVPB      NIFEdipine XL 60 milliGRAM(s) Oral two times a day  nystatin    Suspension 445733 Unit(s) Oral four times a day  pantoprazole    Tablet 40 milliGRAM(s) Oral daily  phenytoin   Capsule 100 milliGRAM(s) Oral every 8 hours  polyethylene glycol 3350 17 Gram(s) Oral daily  predniSONE  Solution 5 milliGRAM(s) Oral daily  sodium bicarbonate 1300 milliGRAM(s) Oral <User Schedule>  vancomycin  IVPB 1000 milliGRAM(s) IV Intermittent once      PMHX/PSHX:  Hypertension    Diabetes    Dyslipidemia    CAD (Coronary Artery Disease)    CAD (Coronary Artery Disease)    CRI (Chronic Renal Insufficiency)    Hypothyroidism    CVA (cerebral vascular accident)    Anemia    PEG (percutaneous endoscopic gastrostomy) status    Intubation of airway performed without difficulty    ESRD on dialysis    Seizures    No significant past surgical history    History of insertion of stent into coronary artery bypass graft    S/P CABG x 3        Family history:  No pertinent family history in first degree relatives    No pertinent family history in first degree relatives    No pertinent family history in first degree relatives    FH: HTN (hypertension)    No pertinent family history in first degree relatives    Family history of cancer of tongue (Father)        Social History: no smoking    ROS:   General:  No fevers, chills or night sweats  ENT:  No sore throat or dysphagia  CV:  No pain or palpitations  Resp:  No dyspnea, cough or  wheezing  GI:  as above  Skin:  No rash or edema  Neuro: no weakness   Hematologic: no bleeding  Musculoskeletal: no muscle pain or join pain  Psych: no agitation     : no dysuria      PHYSICAL EXAM:   GENERAL:  Ill appearing, in NAD  HEENT:  Sclera anicteric  CHEST:  Unlabored, CTAB  HEART:  RRR S1/S2, no JVD  ABDOMEN:  +BS, soft, non-tender, non-distended,  healed surgical scar  EXTREMITIES:  No cyanosis or edema  SKIN:  Warm & Dry. No rash or erythema  NEURO:  Awake, alert, conversant    Vital Signs:  Vital Signs Last 24 Hrs  T(C): 38.3 (2022 13:00), Max: 38.3 (2022 13:00)  T(F): 101 (2022 13:00), Max: 101 (2022 13:00)  HR: 61 (2022 14:00) (61 - 83)  BP: 126/62 (2022 14:00) (118/55 - 164/70)  BP(mean): 89 (2022 14:00) (82 - 98)  RR: 20 (2022 14:00) (18 - 21)  SpO2: 98% (2022 14:00) (97% - 99%)    Parameters below as of 2022 13:00  Patient On (Oxygen Delivery Method): room air      Daily     Daily Weight in k.5 (2022 05:09)    LABS:                        7.8    2.94  )-----------( 391      ( 2022 06:29 )             24.5     Mean Cell Volume: 96.1 fl (22 @ 06:29)        128<L>  |  98  |  81<H>  ----------------------------<  180<H>  5.3   |  15<L>  |  2.22<H>    Ca    8.7      2022 11:51  Phos  3.9       Mg     2.0         TPro  6.8  /  Alb  3.0<L>  /  TBili  0.3  /  DBili  x   /  AST  555<H>  /  ALT  580<H>  /  AlkPhos  386<H>      LIVER FUNCTIONS - ( 2022 11:51 )  Alb: 3.0 g/dL / Pro: 6.8 g/dL / ALK PHOS: 386 U/L / ALT: 580 U/L / AST: 555 U/L / GGT: x           PT/INR - ( 2022 06:30 )   PT: 16.6 sec;   INR: 1.44 ratio         PTT - ( 2022 06:30 )  PTT:40.7 sec                            7.8    2.94  )-----------( 391      ( 2022 06:29 )             24.5                         7.7    2.71  )-----------( 399      ( 2022 07:05 )             24.1                         7.8    1.92  )-----------( 359      ( 2022 07:07 )             24.6     Imaging:

## 2022-07-25 NOTE — PROGRESS NOTE ADULT - NUTRITIONAL ASSESSMENT
This patient has been assessed with a concern for Malnutrition and has been determined to have a diagnosis/diagnoses of Severe protein-calorie malnutrition.    This patient is being managed with:   Diet NPO-  Entered: Jul 25 2022  8:15AM

## 2022-07-25 NOTE — PROGRESS NOTE ADULT - SUBJECTIVE AND OBJECTIVE BOX
[facility] [place] [facility]   MD NINA MASON MD, DO SADIE RAMIREZ NP    TEL:  OFFICE: [phone]    From 5pm-7am Answering Service [phone]    -- RENAL FOLLOW UP NOTE ---Date of Service 07-25-22 @ 13:35    Patient is a 66y old  Male who presents with a chief complaint of Status Epilepticus (25 Jul 2022 12:14)      Patient seen and examined at bedside. No chest pain/sob    VITALS:  T(F): 101 (07-25-22 @ 13:00), Max: 101 (07-25-22 @ 13:00)  HR: 61 (07-25-22 @ 13:00)  BP: 118/55 (07-25-22 @ 13:00)  RR: 21 (07-25-22 @ 13:00)  SpO2: 97% (07-25-22 @ 13:00)  Wt(kg): --    07-24 @ 07:01  -  07-25 @ 07:00  --------------------------------------------------------  IN: 720 mL / OUT: 750 mL / NET: -30 mL    07-25 @ 07:01  -  07-25 @ 13:35  --------------------------------------------------------  IN: 240 mL / OUT: 0 mL / NET: 240 mL          PHYSICAL EXAM:  Constitutional: NAD  Neck: No JVD  Respiratory: CTAB, no wheezes, rales or rhonchi  Cardiovascular: S1, S2, RRR  Gastrointestinal: BS+, soft, NT/ND  Extremities: No peripheral edema    Hospital Medications:   MEDICATIONS  (STANDING):  carvedilol 25 milliGRAM(s) Oral every 12 hours  chlorhexidine 2% Cloths 1 Application(s) Topical <User Schedule>  doxazosin 4 milliGRAM(s) Oral <User Schedule>  enoxaparin Injectable 60 milliGRAM(s) SubCutaneous <User Schedule>  epoetin cortney-epbx (RETACRIT) Injectable 09768 Unit(s) SubCutaneous every 7 days  finasteride 5 milliGRAM(s) Oral daily  insulin glargine Injectable (LANTUS) 5 Unit(s) SubCutaneous at bedtime  insulin lispro (ADMELOG) corrective regimen sliding scale   SubCutaneous three times a day with meals  insulin lispro (ADMELOG) corrective regimen sliding scale   SubCutaneous at bedtime  insulin lispro Injectable (ADMELOG) 4 Unit(s) SubCutaneous three times a day before meals  levothyroxine 50 MICROGram(s) Oral daily  melatonin 6 milliGRAM(s) Oral at bedtime  meropenem  IVPB      meropenem  IVPB 1000 milliGRAM(s) IV Intermittent once  NIFEdipine XL 60 milliGRAM(s) Oral two times a day  nystatin    Suspension 391236 Unit(s) Oral four times a day  pantoprazole    Tablet 40 milliGRAM(s) Oral daily  phenytoin   Capsule 100 milliGRAM(s) Oral every 8 hours  polyethylene glycol 3350 17 Gram(s) Oral daily  predniSONE  Solution 5 milliGRAM(s) Oral daily  sodium bicarbonate 1300 milliGRAM(s) Oral <User Schedule>      LABS:  07-25    128<L>  |  98  |  81<H>  ----------------------------<  180<H>  5.3   |  15<L>  |  2.22<H>    Ca    8.7      25 Jul 2022 11:51  Phos  3.9     07-25  Mg     2.0     07-25    TPro  6.8  /  Alb  3.0<L>  /  TBili  0.3  /  DBili      /  AST  555<H>  /  ALT  580<H>  /  AlkPhos  386<H>  07-25    Creatinine Trend: 2.22 <--, 2.13 <--, 1.79 <--, 1.80 <--, 1.50 <--, 1.43 <--, 1.37 <--, 1.50 <--, 1.41 <--    Albumin, Serum: 3.0 g/dL (07-25 @ 11:51)  Albumin, Serum: 2.7 g/dL (07-25 @ 06:30)  Phosphorus Level, Serum: 3.9 mg/dL (07-25 @ 06:30)  Albumin, Serum: 2.6 g/dL (07-24 @ 18:13)                              7.8    2.94  )-----------( 391      ( 25 Jul 2022 06:29 )             24.5     Urine Studies:  Urinalysis - [07-18-22 @ 10:17]      Color Light Yellow / Appearance Clear / SG 1.018 / pH 6.0      Gluc Negative / Ketone Negative  / Bili Negative / Urobili Negative       Blood Negative / Protein 30 mg/dL / Leuk Est Negative / Nitrite Negative      RBC 3 / WBC 2 / Hyaline 0 / Gran  / Sq Epi  / Non Sq Epi 1 / Bacteria Negative      Iron 17, TIBC 171, %sat 10      [05-02-22 @ 13:03]  Ferritin 1505      [05-02-22 @ 13:05]  PTH -- (Ca 9.2)      [02-05-22 @ 10:13]   294  HbA1c 6.0      [02-21-20 @ 08:53]  TSH 4.69      [07-06-22 @ 18:36]      Syphilis Screen (Treponema Pallidum Ab) Negative      [06-27-22 @ 02:00]    RADIOLOGY & ADDITIONAL STUDIES:

## 2022-07-25 NOTE — PROGRESS NOTE ADULT - SUBJECTIVE AND OBJECTIVE BOX
[facility] DIVISION OF KIDNEY DISEASES AND HYPERTENSION   FOLLOW UP NOTE    --------------------------------------------------------------------------------  HPI: 66 yr old man with h/o ESRD due to DM on HD since 2019, s/p DDRT from DCD donor on 4/21/22 complicated by DGF requiring HD until 4/29/22.   PMH: DM type II, HTN, CAD s/p CABG in 2020, Afib on Eliquis, CVA in 2019 due to Afib, Seizure d/o following CVA, h/o GI bleed in 2/2022 EGD with duodenal ulcer.  Donor was 58 , KDPI 82%, DCD, single vessels and ureter, HLA mismatch 1, 2, 2. No DSA, cPRA 0%. CMV +/+    Hospitalized May 2022 for anemia in the setting of large perinephric hematoma s/p evacuation and repair of arterial anastomosis on 5/2/22. Intra operative biopsy showed no rejection. H/o seizure d/o with last seizure episode prior to transplant was on 4/8/22. He is readmitted for status epilepticus in the setting of sub therapeutic valproic acid levels. Course complicated by respiratory failure, hemothorax and hemorrhagic shock following right thoracentesis done on 6/10 for effusion. S/p PRBC x 5 units and vasopressors, chest tube, followed by VATS.    Envarsus discontinued and started on belatacept due to concern for neurotoxicity. Allograft function is stable, mental status is slowly improving. He is on fosphenytoin with good control of seizures. Renal sonogram done on 7/19 showed no hydronephrosis. MARA was not detected on US. DSA sent on 7/19/22 were negative.    Pt was noted to have worsening renal function with SCr peaked to 2.13 today. Other labs significant for elevated LFTs and SNa of 128. Pt also developed fevers and has persistent luecopenia. Pt. was seen and examined today, weak appearing. Scr elevated at 2.13 today . Pt nonoliguric with UOP of 750cc as documented.     PAST HISTORY  --------------------------------------------------------------------------------  No significant changes to PMH, PSH, FHx, SHx, unless otherwise noted    ALLERGIES & MEDICATIONS  --------------------------------------------------------------------------------  Allergies  No Known Allergies    Intolerances      Standing Inpatient Medications  carvedilol 25 milliGRAM(s) Oral every 12 hours  chlorhexidine 2% Cloths 1 Application(s) Topical <User Schedule>  doxazosin 4 milliGRAM(s) Oral <User Schedule>  enoxaparin Injectable 60 milliGRAM(s) SubCutaneous <User Schedule>  epoetin cortney-epbx (RETACRIT) Injectable 31812 Unit(s) SubCutaneous every 7 days  finasteride 5 milliGRAM(s) Oral daily  insulin glargine Injectable (LANTUS) 5 Unit(s) SubCutaneous at bedtime  insulin lispro (ADMELOG) corrective regimen sliding scale   SubCutaneous three times a day with meals  insulin lispro (ADMELOG) corrective regimen sliding scale   SubCutaneous at bedtime  insulin lispro Injectable (ADMELOG) 4 Unit(s) SubCutaneous three times a day before meals  levothyroxine 50 MICROGram(s) Oral daily  melatonin 6 milliGRAM(s) Oral at bedtime  meropenem  IVPB      NIFEdipine XL 60 milliGRAM(s) Oral two times a day  nystatin    Suspension 948865 Unit(s) Oral four times a day  pantoprazole    Tablet 40 milliGRAM(s) Oral daily  phenytoin   Capsule 100 milliGRAM(s) Oral every 8 hours  polyethylene glycol 3350 17 Gram(s) Oral daily  predniSONE  Solution 5 milliGRAM(s) Oral daily  sodium bicarbonate 1300 milliGRAM(s) Oral <User Schedule>    PRN Inpatient Medications  acetaminophen     Tablet .. 650 milliGRAM(s) Oral every 6 hours PRN      REVIEW OF SYSTEMS  --------------------------------------------------------------------------------  Gen: fevers, fatigue+  Head/Eyes/Ears: No HA   Respiratory: No dyspnea, cough  CV: No chest pain  GI: No abdominal pain, diarrhea, as per HPI  : No dysuria, hematuria, as per HPI  MSK: No  edema  Skin: No rashes  Heme: No easy bruising or bleeding    All other systems were reviewed and are negative, except as noted.    VITALS/PHYSICAL EXAM  --------------------------------------------------------------------------------  T(C): 37.6 (07-25-22 @ 09:00), Max: 37.7 (07-25-22 @ 01:02)  HR: 69 (07-25-22 @ 09:00) (69 - 83)  BP: 152/70 (07-25-22 @ 09:00) (141/62 - 164/70)  RR: 18 (07-25-22 @ 09:00) (18 - 20)  SpO2: 99% (07-25-22 @ 09:00) (98% - 99%)  Wt(kg): --      07-24-22 @ 07:01  -  07-25-22 @ 07:00  --------------------------------------------------------  IN: 720 mL / OUT: 750 mL / NET: -30 mL    07-25-22 @ 07:01  -  07-25-22 @ 12:15  --------------------------------------------------------  IN: 240 mL / OUT: 0 mL / NET: 240 mL      Physical Exam:  	Gen: ill appearing elderly male  	HEENT: Anicteric  	Pulm: CTA B/L  	CV: S1S2+  	Abd: Soft, +BS               Transplant site: RLQ non tender, well healed surgical scar+  	Ext: No LE edema B/L  	Neuro: Awake  	Skin: Warm and dry  	    LABS/STUDIES  --------------------------------------------------------------------------------              7.8    2.94  >-----------<  391      [07-25-22 @ 06:29]              24.5     128  |  98  |  74  ----------------------------<  124      [07-25-22 @ 06:30]  5.2   |  16  |  2.13        Ca     8.6     [07-25-22 @ 06:30]      Mg     2.0     [07-25-22 @ 06:30]      Phos  3.9     [07-25-22 @ 06:30]    Creatinine Trend:  SCr 2.13 [07-25 @ 06:30]  SCr 1.79 [07-24 @ 18:13]  SCr 1.80 [07-24 @ 07:07]  SCr 1.50 [07-23 @ 07:07]  SCr 1.43 [07-22 @ 06:18]    Urinalysis - [07-18-22 @ 10:17]      Color Light Yellow / Appearance Clear / SG 1.018 / pH 6.0      Gluc Negative / Ketone Negative  / Bili Negative / Urobili Negative       Blood Negative / Protein 30 mg/dL / Leuk Est Negative / Nitrite Negative      RBC 3 / WBC 2 / Hyaline 0 / Gran  / Sq Epi  / Non Sq Epi 1 / Bacteria Negative    Syphilis Screen (Treponema Pallidum Ab) Negative      [06-27-22 @ 02:00]    CMV PCRCMVPCR Log: NotDetec Ahb53GT/mL (07-22 @ 06:18)  CMVPCR Log: NotDetec Wrp32ZH/mL (06-26 @ 18:14)  CMVPCR Log: NotDetec Vir47UU/mL (06-25 @ 14:08)    Parvo PCRParvovirus B19 DNA by PCR: NotDetec IU/mL (06-26 @ 18:14)

## 2022-07-25 NOTE — PROGRESS NOTE ADULT - ASSESSMENT
66 year old Male with PMHx ESRD s/p permacath removal 5/10/22 s/p Rt DDRT 4/21/22,  CVA (2019), Afib on apixaban, seizure activity, CAD s/p stents, CABG (2020), HTN, HLD, DM on insulin, gastric/duodenal ulcer, recent hospitalization 4/28/22 -5/10/22 with weakness/anemia found to have perinephric hematoma requiring evacuation and repair of bleeding arterial anastomosis who presented to [facility] for status epilepticus requiring intubation for hypoxic respiratory failure    Subsequently developed continued seizures and hypothermia  Was initiated on empiric antibiotics  s/p LP which was not suggestive of infectious process  Blood, urine and sputum cultures without positive sample    U/A (7/12) 161 WBC  UCx (7/13) 50-99K ESBL Klebsiella   s/p 3 days of Ertapenem    Now with uptrending transaminases  Febrile today  RUQ (7/25) with hepatomegaly   RVP (7/25) Negative  CMV (7/22) Negative    #Fever, Transaminitis, transaminitis  Renal Transplant Recipient  --Recommend Vancomycin 1g IV x1  --Recommend Meropenem 1g IV Q12H  --Recommend Adenovirus, Parvovirus, HHV6, EBV PCR's  --Agree with CT C/A/P  --Continue to follow CBC with diff  --Continue to follow renal function (Cr/BUN)  --Continue to follow transaminases  --Continue to follow temperature curve  --Follow up on preliminary blood cultures  --Follow up on preliminary urine culture    I will continue to follow. Please feel free to contact me with any further questions.    Carlton Hoover M.D.  [facility] Division of Infectious Disease  8AM-5PM Monday - Friday: Available on [employer] Teams  After Hours and Holidays (or if no response on [employer] Teams): Please contact the Infectious Diseases Office at [phone]    The above assessment and plan were discussed with Kirstie, transplant surgery PA

## 2022-07-25 NOTE — PROGRESS NOTE ADULT - ASSESSMENT
66M with h/o DM type II, HTN, CAD s/p CABG in 2020, Afib on Eliquis, CVA in 2019 due to Afib, Seizure d/o (last episode was on 4/8/22), h/o GI bleed in 2/2022 EGD with duodenal ulcer and ESRD on HD s/p R DDRT from DCD donor on 4/21/22 complicated by DGF requiring HD until 4/29/22 now with good graft function who presented with status epilepticus in setting of UTI/antibiotics and sub-therapeutic valproic acid level     Seizure Disorder:  - MRI head 6/27 with less concerns for PRES, likely ischemic changes  - Remains seizure free  - Antiepileptic regimen per Neurology, on Phenytoin 100mg TID, no further adjustment required  - Keep Mag > 2    R DCD DDRT 4/21/22:  - Uptrending SCr, NS 500cc bolus today, repeat BMP this afternoon  - DM diet as tolerated with aspiration precautions  - Continue Doxazosin/Proscar for BPH  - ESBL Klebsiella UTI (post stent removal): completed Ertapenem.   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 Vocal cords good. Reflux precautions. Voice rest. overall improving  - OOB with RN/PT  - Anemia: stable, no acute signs of bleedinga this time.  Continue Retacrit weekly  - Leukopenia: held valcyte, CMV negative. Held MMF     Immunosuppression:   - Belatacept: 6/23, 7/4, 4/18 (initial loading). Re-loaded 7/8 as there was a gap between doses 2/2 seizures.  - MMF held  - continue Pred 5  - PPx: Valcyte HELD/PPI/nystatin (thrush)/Bactrim    DM  - on Lantus/Lispro, Endocrine following     AFib:  - Eliquis with ~40% less efficacy while on fosphenytoin.  On Lovenox 60BID. Plan to continue switch to Coumadin once discharged home from rehab. Plan discussed with neurology and family  - rate controlled    R IJ DVT  - On Lovenox 60BID    HTN:  - Nifedipine/Hydralazine/Cardura/Coreg. adjust prn    DISPO  - given hospital course and currently lab abnormalities, will monitor today and hold Acute Rehab d/c  - f/u with Dr. Andre Patterson, Epilepsy team upon d/c per family request  - f/u with Dr. Lincoln, Endocrine upon d/c     66M with h/o DM type II, HTN, CAD s/p CABG in 2020, Afib on Eliquis, CVA in 2019 due to Afib, Seizure d/o (last episode was on 4/8/22), h/o GI bleed in 2/2022 EGD with duodenal ulcer and ESRD on HD s/p R DDRT from DCD donor on 4/21/22 complicated by DGF requiring HD until 4/29/22 now with good graft function who presented with status epilepticus in setting of UTI/antibiotics and sub-therapeutic valproic acid level     Acute Transaminitis   - Hepatology consulted   - RUQ abd us  - Abdominal doppler   - CT a/p non contrast  - Labs: Hepatitis panel, BK PCR, EBV PCR, CMV with PCR   - Infectious w/u: bld/urine cxs; UA  - Start meropenem   - Reconsult ID     R DCD DDRT 4/21/22:  - S/P removal of ureteral stent on 7/12  - Uptrending SCr, Repeat Renal doppler  - DM diet as tolerated with aspiration precautions  - Continue Doxazosin/Proscar for BPH  - OOB with RN/PT  - Anemia: 1u PRBC today, continue Retacrit   - Leukopenia: held valcyte, CMV negative. Held MMF     Immunosuppression:   - Belatacept: 6/23, 7/4, 4/18 (initial loading). Re-loaded 7/8 as there was a gap between doses 2/2 seizures.  - MMF held  - continue Pred 5  - PPx: Valcyte HELD/PPI/nystatin (thrush)/Bactrim    Seizure Disorder:  - MRI head 6/27 with less concerns for PRES, likely ischemic changes  - Remains seizure free  - Antiepileptic regimen per Neurology, on Phenytoin 100mg TID, no further adjustment required  - Keep Mag > 2    DM  - on Lantus/Lispro, Endocrine following     AFib  RIJ DVT   - Eliquis with ~40% less efficacy while on fosphenytoin.    - Decrease Lovenox 60mg daily  (from bid) in setting of transaminitis   - rate controlled    R IJ DVT  - On Lovenox 60BID    HTN:  - Nifedipine/Cardura/Coreg.   - dced Hydralazine (in setting of transaminitis     Dispo:   Transfer to SICU for close monitoring  High risk for decompensation  Discussed with pt's son.

## 2022-07-25 NOTE — PROGRESS NOTE ADULT - ASSESSMENT
65 y/o male w/ a PMHx of CAD s/p CABG, AF on Eliquis c/b CVA c/b seizures, HTN, HLD, DM type II, hypothyroidism, GI bleed due to a duodenal ulcer, and ESRD s/p DDRT c/b DGF requiring HD & large perinephric hematoma s/p evacuation w/ revision of arterial anastomosis who presented in status epilepticus requiring intubation for airway protection with a hospital course c/b large right pleural effusion s/p thoracentesis c/b hemothorax while being anticoagulated & requiring intubation for airway protection s/p chest tube placement w/ CT demonstrating retained hemothorax s/p right VATS, status epilepticus again causing AMS requiring intubation for airway protection again, delirium, dysphagia requiring NGT placement for enteral access, hyperkalemia, and poor glycemic control. Pt transferred to floor 7/10 and readmitted to SICU 7/25 for leukopenia, transaminitis, and ANA.       PLAN:  Neuro: 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 c/b status epilepticus, delirium  - Pain control if needed w/ acetaminophen  - Seizures controlled with current regimen of phenytoin 100mg q8  - Melatonin qhs for insomnia, protected sleep time    Resp: intubated for airway protection x3, now extubated, large right pleural effusion s/p thoracentesis c/b hemothorax while being anticoagulated s/p chest tube placement c/b retained hemothorax s/p right VATS  - Out of bed to chair, incentive spirometry, and aggressive chest PT to prevent atelectasis  - Maintain SpO2 >92%   - Repeat RVP with COVID swab  - CT chest to evaluate for pneumonia as source of sepsis    CV: CAD, AF, HTN  - Carvedilol 25mg q 12, nifedipine 60mg BID for HTN    GI: transaminitis  - Possibility this could be attributed to phenytoin, neurology will continue to monitor  - Lipitor and Bactrim discontinued.   - Check RUQ US - negative for portal vein thrombus  - Hepatology consulted.   - Was tolerating regular diet   - NPO for now pending CT A/P to evaluate for source of sepsis  - Continue Miralax  - Protonix for stress ulcer prophylaxis    Renal:  s/p DDRT; ANA, hyponatremia  - Renal US - no evidence of renal artery stenosis  - No Miranda, voiding spontaneously  - Continue Doxazosin and Finasteride   - PO Bicarb 1300mg BID for metabolic acidosis 2/2 ANA    Heme: anemia of chronic diseases, AC for A.fib  - was on Eliquis, now on therapeutic Lovenox due to drug interaction with fosphenytoin. Lovenox dose now adjusted to daily dosing due to decreased egfr.  - Monitor CBC and coags  - Epogen for anemia of chronic diseases  - 1u PRBC transfusion for anemia and h/o CAD    ID: recent ESBL Klebsiella UTI s/p Ertapenem x3 days, immunosuppression  - Monitor WBC, temperature, and procalcitonin  - Send UA, Urine culture, Blood cultures x 2, procalcitonin  - CT CAP non-con to evaluate for source of infection  - Start meropenem, give 1 dose vancomycin  - Infection prophylaxis - Bactrim discontinued due to elevated LFTs. Continue holding Valcyte 450mg daily (CMV intermediate risk) for now given leukopenia, CMV PCR negative. Continue nystatin.  - Immunosuppression - continue prednisone 5 mg qd     Endo: DM type II, hypothyroidism  - Monitor glucose w/ ISS q6hrs for glycemic control; HgbA1C 6.6% on 4/24  - Lantus 5 u qhs, 4 units premeal admelog, ISS  - Synthroid for hypothyroidism      DISPO: SICU. Discussed with transplant team & Dr. Kaufman

## 2022-07-25 NOTE — PROGRESS NOTE ADULT - NUTRITIONAL ASSESSMENT
This patient has been assessed with a concern for Malnutrition and has been determined to have a diagnosis/diagnoses of Severe protein-calorie malnutrition.    This patient is being managed with:   Diet NPO-  NPO for Procedure/Test     NPO Start Date: 25-Jul-2022   NPO Start Time: 14:00  Entered: Jul 25 2022  1:51PM    Diet Consistent Carbohydrate w/Evening Snack-  1200mL Fluid Restriction ([id number])  Supplement Feeding Modality:  Oral  Nepro Cans or Servings Per Day:  2       Frequency:  Daily  Entered: Jul 25 2022 10:54AM

## 2022-07-25 NOTE — PROGRESS NOTE ADULT - SUBJECTIVE AND OBJECTIVE BOX
Transplant Surgery Multidisciplinary Progress Note   --------------------------------------------------------------  R DCD DDRT    4/21/22    Present: Patient seen and examined with multidisciplinary team including Transplant Surgeon: Dr. Tena, Nephrologist: Dr. Waqar Lomeli, WALDO Esparza and unit RN during am rounds.  Disciplines not in attendance will be notified of the plan.     HPI: 66M with PMH: DM type II, HTN, CAD s/p CABG in 2020, AFib on Eliquis, CVA in 2019 due to Afib, Seizure d/o following CVA, last episode was on 4/8/22, h/o GIB in 2/2022 EGD with duodenal ulcer.  ESRD due to DM was on HD since 2019.     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  Donor was 58 , KDPI 82%, DCD, single vessels and ureter, HLA mismatch 1, 2, 2. No DSA, cPRA 0%. 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 Re-admitted in May for anemia in the setting of large perinephric hematoma s/p evacuation and repair of arterial anastomosis on 5/2/22. Intra operative biopsy showed no rejection, Creatinine ranging ~2mg/dL.    In Rehab:  He was recently dx with klebsiella UTI rx with ertapenem - then switched to Levaquin day #6.    He also had parainfluenza pneumonia. Was at rehab progressing well.      Hospital course:  - 6/10: transferred from rehab facility for seizure status epilepticus. Intubated for respiratory protection and transferred to MICU.  EEG showed no seizure activity. Neuro consulted.   - 6/11: Extubated. Found to have R pleural effusion. s/p Thoracentesis 1.3L. Eliquis changed to heparin drip.  Post procedure drop in H&H. 5u PRBC, 2 u FFP.   - 6/11 evening: Transferred to SICU from MICU for further care in setting of hypoxia, significant R pleural effusion, anemia and hemodynamic instability  - 6/11 Intubated at 9pm and sedated on Precedex.  CT placed, 600ml blood drained.  1L total overnight, started pressors  - 6/11 Received Protamine for PTT >100 (was on Heparin drip started for AF), 2 u FFP and 5u PRBC total  - 6/13 s/p VATS 2.2L old blood removed; second chest tube placed  - 6/18-19: chest tubes removed  - 6/21: ?PRES vs. chronic ischemic changes on MRI, off Envarsus, switched to Belatacept 6/23 with good graft function  - 6/25: Tx to SICU for refractory seizures, improved with IV antiepileptic regimen  - 7/10: Passed bedside S&S in SICU. Downgraded from SICU to floor.   - 7/11: OR-->URETERAL STENT REMOVED  - 7/15: ESBL Kleb UTI (50-90K), completed Ertapenem x 3 Days    Interval Events:  - no acute overnight events  - MMF held for leukopenia  - received Neupogen x 1  - NA better 132  - Incr coreg 25mg bid    Immunosuppression:   Induction:  Thymoglobulin                                        Maintenance:  - Belatacept: 6/23, 7/4, 7/8, 7/18. Next dose 8/1  -  BID (leukopenia), Pred 5mg daily   - Ongoing monitoring for signs of rejection.    Potential Discharge date: pending clinical improvement   Education:  Medications  Plan of care:  See Below    MEDICATIONS  (STANDING):  atorvastatin 40 milliGRAM(s) Oral at bedtime  carvedilol 25 milliGRAM(s) Oral every 12 hours  chlorhexidine 2% Cloths 1 Application(s) Topical <User Schedule>  doxazosin 4 milliGRAM(s) Oral <User Schedule>  enoxaparin Injectable 60 milliGRAM(s) SubCutaneous every 12 hours  epoetin cortney-epbx (RETACRIT) Injectable 29015 Unit(s) SubCutaneous every 7 days  finasteride 5 milliGRAM(s) Oral daily  hydrALAZINE 100 milliGRAM(s) Oral every 8 hours  insulin glargine Injectable (LANTUS) 5 Unit(s) SubCutaneous at bedtime  insulin lispro (ADMELOG) corrective regimen sliding scale   SubCutaneous three times a day with meals  insulin lispro (ADMELOG) corrective regimen sliding scale   SubCutaneous at bedtime  insulin lispro Injectable (ADMELOG) 4 Unit(s) SubCutaneous three times a day before meals  levothyroxine 50 MICROGram(s) Oral daily  melatonin 6 milliGRAM(s) Oral at bedtime  NIFEdipine XL 60 milliGRAM(s) Oral two times a day  nystatin    Suspension 858679 Unit(s) Oral four times a day  pantoprazole    Tablet 40 milliGRAM(s) Oral daily  phenytoin   Capsule 100 milliGRAM(s) Oral every 8 hours  polyethylene glycol 3350 17 Gram(s) Oral daily  predniSONE  Solution 5 milliGRAM(s) Oral daily  trimethoprim   80 mG/sulfamethoxazole 400 mG 1 Tablet(s) Oral daily    MEDICATIONS  (PRN):  acetaminophen     Tablet .. 650 milliGRAM(s) Oral every 6 hours PRN Mild Pain (1 - 3)      PAST MEDICAL & SURGICAL HISTORY:  Hypertension  Diabetes  Dyslipidemia  CAD (Coronary Artery Disease) with Stents in 06/2009 and 6/2019, s/p off-pump C3L on 8/13/20  Hypothyroidism  CVA (cerebral vascular accident) 12/13/19 with residual bilateral weakness  Anemia  PEG (percutaneous endoscopic gastrostomy) removed July 2020  ESRD on dialysis M/W/F  Seizures after CVA, last seizure was last week 4/07/22  History of insertion of stent into coronary artery bypass graft 2009 and 2019  S/P CABG x 3 off pump C3L on 8/13/20    Vital Signs Last 24 Hrs  T(C): 36.8 (24 Jul 2022 09:00), Max: 37.2 (23 Jul 2022 20:54)  T(F): 98.2 (24 Jul 2022 09:00), Max: 99 (24 Jul 2022 05:09)  HR: 61 (24 Jul 2022 09:00) (61 - 71)  BP: 154/68 (24 Jul 2022 09:00) (133/66 - 160/72)  BP(mean): 99 (24 Jul 2022 05:09) (99 - 103)  RR: 18 (24 Jul 2022 09:00) (18 - 19)  SpO2: 99% (24 Jul 2022 09:00) (97% - 100%)    I&O's Summary    23 Jul 2022 07:01  -  24 Jul 2022 07:00  --------------------------------------------------------  IN: 720 mL / OUT: 1500 mL / NET: -780 mL    24 Jul 2022 07:01  -  24 Jul 2022 10:30  --------------------------------------------------------  IN: 0 mL / OUT: 200 mL / NET: -200 mL                          7.7    2.71  )-----------( 399      ( 24 Jul 2022 07:05 )             24.1     07-24    132<L>  |  101  |  59<H>  ----------------------------<  102<H>  4.5   |  20<L>  |  1.80<H>    Ca    8.6      24 Jul 2022 07:07  Phos  3.3     07-24  Mg     2.0     07-24    TPro  6.2  /  Alb  2.6<L>  /  TBili  0.2  /  DBili  x   /  AST  28  /  ALT  26  /  AlkPhos  132<H>  07-24    REVIEW OF SYSTEMS  ------------------------------------  Gen: +fatigue, No fevers/chills, +weakness--all improving  Skin: No rashes  Head/Eyes/Ears/Mouth: No headache; Normal hearing; Normal vision w/o blurriness; No sinus pain/discomfort, sore throat  Respiratory: No dyspnea, cough, wheezing, hemoptysis  CV: No chest pain, PND, orthopnea  GI: No abdominal pain, diarrhea, constipation, nausea, vomiting, melena, hematochezia  : No increased frequency, dysuria, hematuria, nocturia  MSK: No joint pain/swelling; no back pain; no edema  Neuro: No dizziness/lightheadedness,  seizures, numbness, tingling, +weakness  Heme: No easy bruising or bleeding  Endo: No heat/cold intolerance  Psych: No significant nervousness, anxiety, stress, depression  All other systems were reviewed and are negative, except as noted.    PHYSICAL EXAM:  Constitutional: Well developed / well nourished  Eyes: Anicteric, PERRLA  ENMT: nc/at, +oral thrush improving  Neck: supple  Respiratory: CTA   Cardiovascular: RRR  Gastrointestinal: Soft, non distended, NT, RLQ incision healed   Genitourinary: voiding spontaneously  Extremities: SCD's in place and working bilaterally, overall edema has improved  Vascular: Palpable dp pulses bilaterally  Neurological: AAOx3  Skin: no rashes, ulcerations or lesions  Musculoskeletal: Moving all extremities, global weakness  Psychiatric: calm, follows commands and interacts appropriately     Transplant Surgery Multidisciplinary Progress Note   --------------------------------------------------------------  R DCD DDRT    4/21/22    Present:  Patient seen and examined with multidisciplinary team including Transplant Surgeon: Dr. Tena, Transplant Nephrologist: Dr. Alfred, Nephrology Fellow Dr. Thompson, PGY3 Dr. Naqvi , WALDO Esparza,  and unit RN during am rounds.  Disciplines not in attendance will be notified of the plan.    HPI: 66M with PMH: DM type II, HTN, CAD s/p CABG in 2020, AFib on Eliquis, CVA in 2019 due to Afib, Seizure d/o following CVA, last episode was on 4/8/22, h/o GIB in 2/2022 EGD with duodenal ulcer.  ESRD due to DM was on HD since 2019.     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  Donor was 58 , KDPI 82%, DCD, single vessels and ureter, HLA mismatch 1, 2, 2. No DSA, cPRA 0%. 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 Re-admitted in May for anemia in the setting of large perinephric hematoma s/p evacuation and repair of arterial anastomosis on 5/2/22. Intra operative biopsy showed no rejection, Creatinine ranging ~2mg/dL.    In Rehab:  He was recently dx with klebsiella UTI rx with ertapenem - then switched to Levaquin day #6.    He also had parainfluenza pneumonia. Was at rehab progressing well.      Hospital course:  - 6/10: transferred from rehab facility for seizure status epilepticus. Intubated for respiratory protection and transferred to MICU.  EEG showed no seizure activity. Neuro consulted.   - 6/11: Extubated. Found to have R pleural effusion. s/p Thoracentesis 1.3L. Eliquis changed to heparin drip.  Post procedure drop in H&H. 5u PRBC, 2 u FFP.   - 6/11 evening: Transferred to SICU from MICU for further care in setting of hypoxia, significant R pleural effusion, anemia and hemodynamic instability  - 6/11 Intubated at 9pm and sedated on Precedex.  CT placed, 600ml blood drained.  1L total overnight, started pressors  - 6/11 Received Protamine for PTT >100 (was on Heparin drip started for AF), 2 u FFP and 5u PRBC total  - 6/13 s/p VATS 2.2L old blood removed; second chest tube placed  - 6/18-19: chest tubes removed  - 6/21: ?PRES vs. chronic ischemic changes on MRI, off Envarsus, switched to Belatacept 6/23 with good graft function  - 6/25: Tx to SICU for refractory seizures, improved with IV antiepileptic regimen  - 7/10: Passed bedside S&S in SICU. Downgraded from SICU to floor.   - 7/11: OR-->URETERAL STENT REMOVED  - 7/15: ESBL Kleb UTI (50-90K), completed Ertapenem x 3 Days    Interval Events:  - Rising SCr 2.3 (from 1.8)  - Received NS 500cc bolus yest  - Acute rise in LFTS: held bactrim/lipitor; decr lovenox daily   - Pending liver doppler  - Emesis x 1 overnight   - Remains leukopenic (WBC 2.94)     Immunosuppression:   Induction:  Thymoglobulin                                        Maintenance:  - Belatacept: 6/23, 7/4, 7/8, 7/18. Next dose 8/1  -  BID (leukopenia), Pred 5mg daily   - Ongoing monitoring for signs of rejection.    Potential Discharge date: pending clinical improvement   Education:  Medications  Plan of care:  See Below    MEDICATIONS  (STANDING):  carvedilol 25 milliGRAM(s) Oral every 12 hours  chlorhexidine 2% Cloths 1 Application(s) Topical <User Schedule>  doxazosin 4 milliGRAM(s) Oral <User Schedule>  enoxaparin Injectable 60 milliGRAM(s) SubCutaneous <User Schedule>  epoetin cortney-epbx (RETACRIT) Injectable 53955 Unit(s) SubCutaneous every 7 days  finasteride 5 milliGRAM(s) Oral daily  insulin glargine Injectable (LANTUS) 5 Unit(s) SubCutaneous at bedtime  insulin lispro (ADMELOG) corrective regimen sliding scale   SubCutaneous at bedtime  insulin lispro (ADMELOG) corrective regimen sliding scale   SubCutaneous three times a day with meals  insulin lispro Injectable (ADMELOG) 4 Unit(s) SubCutaneous three times a day before meals  levothyroxine 50 MICROGram(s) Oral daily  melatonin 6 milliGRAM(s) Oral at bedtime  NIFEdipine XL 60 milliGRAM(s) Oral two times a day  nystatin    Suspension 372843 Unit(s) Oral four times a day  pantoprazole    Tablet 40 milliGRAM(s) Oral daily  phenytoin   Capsule 100 milliGRAM(s) Oral every 8 hours  polyethylene glycol 3350 17 Gram(s) Oral daily  predniSONE  Solution 5 milliGRAM(s) Oral daily  sodium bicarbonate 1300 milliGRAM(s) Oral <User Schedule>    MEDICATIONS  (PRN):  acetaminophen     Tablet .. 650 milliGRAM(s) Oral every 6 hours PRN Mild Pain (1 - 3)    PAST MEDICAL & SURGICAL HISTORY:  Hypertension  Diabetes  Dyslipidemia  CAD (Coronary Artery Disease)  with Stents in 06/2009 and 6/2019, s/p off-pump C3L on 8/13/20  Hypothyroidism  CVA (cerebral vascular accident)  12/13/19 with residual bilateral weakness  Anemia  PEG (percutaneous endoscopic gastrostomy) status  removed July 2020  Intubation of airway performed without difficulty  Dec 2019  CVA c/b status epilepticus requiring intubation and PEG in 12/2019-  ESRD on dialysis M/W/F  Seizures after CVA, last seizure was last week 4/07/22  History of insertion of stent into coronary artery bypass graft 2009 and 2019  S/P CABG x 3 off pump C3L on 8/13/20      Vital Signs Last 24 Hrs  T(C): 37.6 (25 Jul 2022 09:00), Max: 37.7 (25 Jul 2022 01:02)  T(F): 99.7 (25 Jul 2022 09:00), Max: 99.9 (25 Jul 2022 01:02)  HR: 69 (25 Jul 2022 09:00) (69 - 83)  BP: 152/70 (25 Jul 2022 09:00) (141/62 - 164/70)  BP(mean): 92 (25 Jul 2022 01:02) (92 - 98)  RR: 18 (25 Jul 2022 09:00) (18 - 20)  SpO2: 99% (25 Jul 2022 09:00) (98% - 99%)    I&O's Summary    24 Jul 2022 07:01  -  25 Jul 2022 07:00  --------------------------------------------------------  IN: 720 mL / OUT: 750 mL / NET: -30 mL                         7.8    2.94  )-----------( 391      ( 25 Jul 2022 06:29 )             24.5     07-25    128<L>  |  98  |  74<H>  ----------------------------<  124<H>  5.2   |  16<L>  |  2.13<H>    Ca    8.6      25 Jul 2022 06:30  Phos  3.9     07-25  Mg     2.0     07-25    TPro  6.6  /  Alb  2.7<L>  /  TBili  0.3  /  DBili  x   /  AST  685<H>  /  ALT  596<H>  /  AlkPhos  396<H>  07-25    REVIEW OF SYSTEMS  ------------------------------------  Gen: +fatigue, No fevers/chills, +weakness--all improving  Skin: No rashes  Head/Eyes/Ears/Mouth: No headache; Normal hearing; Normal vision w/o blurriness; No sinus pain/discomfort, sore throat  Respiratory: No dyspnea, cough, wheezing, hemoptysis  CV: No chest pain, PND, orthopnea  GI: No abdominal pain, diarrhea, constipation, nausea, vomiting, melena, hematochezia  : No increased frequency, dysuria, hematuria, nocturia  MSK: No joint pain/swelling; no back pain; no edema  Neuro: No dizziness/lightheadedness,  seizures, numbness, tingling, +weakness  Heme: No easy bruising or bleeding  Endo: No heat/cold intolerance  Psych: No significant nervousness, anxiety, stress, depression  All other systems were reviewed and are negative, except as noted.    PHYSICAL EXAM:  Constitutional: Well developed / well nourished  Eyes: Anicteric, PERRLA  ENMT: nc/at,   Neck: supple  Respiratory: CTA   Cardiovascular: RRR  Gastrointestinal: Soft, non distended, NT, RLQ incision healed   Genitourinary: voiding spontaneously  Extremities: SCD's in place and working bilaterally, overall edema has improved  Vascular: Palpable dp pulses bilaterally  Neurological: AAOx3  Skin: no rashes, ulcerations or lesions  Musculoskeletal: Moving all extremities, global weakness  Psychiatric: calm, follows commands and interacts appropriately

## 2022-07-25 NOTE — PROGRESS NOTE ADULT - SUBJECTIVE AND OBJECTIVE BOX
Follow Up:      Interval History:    REVIEW OF SYSTEMS  [  ] ROS unobtainable because:    [  ] All other systems negative except as noted below    Constitutional:  [ ] fever [ ] chills  [ ] weight loss  [ ] weakness  Skin:  [ ] rash [ ] phlebitis	  Eyes: [ ] icterus [ ] pain  [ ] discharge	  ENMT: [ ] sore throat  [ ] thrush [ ] ulcers [ ] exudates  Respiratory: [ ] dyspnea [ ] hemoptysis [ ] cough [ ] sputum	  Cardiovascular:  [ ] chest pain [ ] palpitations [ ] edema	  Gastrointestinal:  [ ] nausea [ ] vomiting [ ] diarrhea [ ] constipation [ ] pain	  Genitourinary:  [ ] dysuria [ ] frequency [ ] hematuria [ ] discharge [ ] flank pain  [ ] incontinence  Musculoskeletal:  [ ] myalgias [ ] arthralgias [ ] arthritis  [ ] back pain  Neurological:  [ ] headache [ ] seizures  [ ] confusion/altered mental status    Allergies  No Known Allergies        ANTIMICROBIALS:  meropenem  IVPB    nystatin    Suspension 971431 four times a day      OTHER MEDS:  MEDICATIONS  (STANDING):  acetaminophen     Tablet .. 650 every 6 hours PRN  acetaminophen     Tablet .. 500 once  carvedilol 25 every 12 hours  doxazosin 4 <User Schedule>  enoxaparin Injectable 60 <User Schedule>  epoetin cortney-epbx (RETACRIT) Injectable 27894 every 7 days  finasteride 5 daily  insulin glargine Injectable (LANTUS) 5 at bedtime  insulin lispro (ADMELOG) corrective regimen sliding scale  three times a day with meals  insulin lispro (ADMELOG) corrective regimen sliding scale  at bedtime  insulin lispro Injectable (ADMELOG) 4 three times a day before meals  levothyroxine 50 daily  melatonin 6 at bedtime  NIFEdipine XL 60 two times a day  pantoprazole    Tablet 40 daily  phenytoin   Capsule 100 every 8 hours  polyethylene glycol 3350 17 daily  predniSONE  Solution 5 daily      Vital Signs Last 24 Hrs  T(C): 37.1 (25 Jul 2022 15:00), Max: 38.3 (25 Jul 2022 13:00)  T(F): 98.7 (25 Jul 2022 15:00), Max: 101 (25 Jul 2022 13:00)  HR: 61 (25 Jul 2022 16:00) (61 - 83)  BP: 138/69 (25 Jul 2022 16:00) (118/55 - 152/70)  BP(mean): 97 (25 Jul 2022 16:00) (82 - 98)  RR: 17 (25 Jul 2022 16:00) (17 - 24)  SpO2: 98% (25 Jul 2022 16:00) (97% - 99%)    Parameters below as of 25 Jul 2022 13:00  Patient On (Oxygen Delivery Method): room air        PHYSICAL EXAMINATION:  General: Alert and Awake, NAD  HEENT: PERRL, EOMI  Neck: Supple  Cardiac: RRR, No M/R/G  Resp: CTAB, No Wh/Rh/Ra  Abdomen: NBS, NT/ND, No HSM, No rigidity or guarding  MSK: No LE edema. No Calf tenderness  : No tucker  Skin: No rashes or lesions. Skin is warm and dry to the touch.   Neuro: Alert and Awake. CN 2-12 Grossly intact. Moves all four extremities spontaneously.  Psych: Calm, Pleasant, Cooperative                          7.8    2.94  )-----------( 391      ( 25 Jul 2022 06:29 )             24.5       07-25    128<L>  |  98  |  81<H>  ----------------------------<  180<H>  5.3   |  15<L>  |  2.22<H>    Ca    8.7      25 Jul 2022 11:51  Phos  3.9     07-25  Mg     2.0     07-25    TPro  6.8  /  Alb  3.0<L>  /  TBili  0.3  /  DBili  x   /  AST  555<H>  /  ALT  580<H>  /  AlkPhos  386<H>  07-25          MICROBIOLOGY:  v  .Blood Blood  07-15-22   No Growth Final  --  --      .Blood Blood  07-15-22   No Growth Final  --  --      Clean Catch Clean Catch (Midstream)  07-13-22   50,000 - 99,000 CFU/mL Klebsiella pneumoniae ESBL  --  Klebsiella pneumoniae ESBL      .CSF CSF  06-29-22   No growth at 1 week.  --    No polymorphonuclear leukocytes seen  No organisms seen  by cytocentrifuge      .Other Other  06-28-22   No Nocardia isolated  --  --      .Blood Blood-Peripheral  06-27-22   No Growth Final  --  --      Combi-Cath Combi-Cath  06-26-22   Normal Respiratory Rachel present  --    Few polymorphonuclear leukocytes per low power field  Rare Squamous Epithelial Cells per low power field  Rare Gram Positive Rods per oil power field      .Blood Blood-Peripheral  06-26-22   No Growth Final  --  --      .Blood Blood-Peripheral  06-26-22   Growth in anaerobic bottle: Staphylococcus epidermidis  Coag Negative Staphylococcus  Single set isolate, possible contaminant. Contact  Microbiology if susceptibility testing clinically  indicated.  ***Blood Panel PCR results on this specimen are available  approximately 3 hours after the Gram stain result.***  Gram stain, PCR, and/or culture results may not always  correspond due to difference in methodologies.  ************************************************************  This PCR assay was performed by multiplex PCR. This  Assay tests for 66 bacterial and resistance gene targets.  Please refer to the [facility] test directory  at [email or web address] for details.  --  Blood Culture PCR      Catheterized Catheterized  06-26-22   No growth  --  --          Rapid RVP Result: NotDetec (07-25 @ 15:15)  CMVPCR Log: NotDetec [id number]/mL (07-22 @ 06:18)        RADIOLOGY:    <The imaging below has been reviewed and visualized by me independently. Findings as detailed in report below> Follow Up:  Fever    Interval History: noted to have increasing transaminitis. febrile today.     REVIEW OF SYSTEMS  [  ] ROS unobtainable because:    [ x ] All other systems negative except as noted below    Constitutional:  [ x] fever [ ] chills  [ ] weight loss  [ ] weakness  Skin:  [ ] rash [ ] phlebitis	  Eyes: [ ] icterus [ ] pain  [ ] discharge	  ENMT: [ ] sore throat  [ ] thrush [ ] ulcers [ ] exudates  Respiratory: [ ] dyspnea [ ] hemoptysis [ ] cough [ ] sputum	  Cardiovascular:  [ ] chest pain [ ] palpitations [ ] edema	  Gastrointestinal:  [ ] nausea [ ] vomiting [ ] diarrhea [ ] constipation [ ] pain	  Genitourinary:  [ ] dysuria [ ] frequency [ ] hematuria [ ] discharge [ ] flank pain  [ ] incontinence  Musculoskeletal:  [ ] myalgias [ ] arthralgias [ ] arthritis  [ ] back pain  Neurological:  [ ] headache [ ] seizures  [ ] confusion/altered mental status    Allergies  No Known Allergies        ANTIMICROBIALS:  meropenem  IVPB    nystatin    Suspension [id number] four times a day      OTHER MEDS:  MEDICATIONS  (STANDING):  acetaminophen     Tablet .. 650 every 6 hours PRN  acetaminophen     Tablet .. 500 once  carvedilol 25 every 12 hours  doxazosin 4 <User Schedule>  enoxaparin Injectable 60 <User Schedule>  epoetin cortney-epbx (RETACRIT) Injectable 89781 every 7 days  finasteride 5 daily  insulin glargine Injectable (LANTUS) 5 at bedtime  insulin lispro (ADMELOG) corrective regimen sliding scale  three times a day with meals  insulin lispro (ADMELOG) corrective regimen sliding scale  at bedtime  insulin lispro Injectable (ADMELOG) 4 three times a day before meals  levothyroxine 50 daily  melatonin 6 at bedtime  NIFEdipine XL 60 two times a day  pantoprazole    Tablet 40 daily  phenytoin   Capsule 100 every 8 hours  polyethylene glycol 3350 17 daily  predniSONE  Solution 5 daily      Vital Signs Last 24 Hrs  T(C): 37.1 (25 Jul 2022 15:00), Max: 38.3 (25 Jul 2022 13:00)  T(F): 98.7 (25 Jul 2022 15:00), Max: 101 (25 Jul 2022 13:00)  HR: 61 (25 Jul 2022 16:00) (61 - 83)  BP: 138/69 (25 Jul 2022 16:00) (118/55 - 152/70)  BP(mean): 97 (25 Jul 2022 16:00) (82 - 98)  RR: 17 (25 Jul 2022 16:00) (17 - 24)  SpO2: 98% (25 Jul 2022 16:00) (97% - 99%)    Parameters below as of 25 Jul 2022 13:00  Patient On (Oxygen Delivery Method): room air        PHYSICAL EXAMINATION:  General: Alert and Awake, NAD  Cardiac: RRR, No M/R/G  Resp: CTAB, No Wh/Rh/Ra  Abdomen: NBS, NT/ND, No HSM, No rigidity or guarding  MSK: No LE edema. No Calf tenderness  : Stage 2 decubitus ulcer (No surrounding erythema, drainage or tenderness to palpation)  Skin: No rashes or lesions. Skin is warm and dry to the touch.   Neuro: Alert and Awake. CN 2-12 Grossly intact. Moves all four extremities spontaneously.  Psych: Calm, Pleasant, Cooperative                          7.8    2.94  )-----------( 391      ( 25 Jul 2022 06:29 )             24.5       07-25    128<L>  |  98  |  81<H>  ----------------------------<  180<H>  5.3   |  15<L>  |  2.22<H>    Ca    8.7      25 Jul 2022 11:51  Phos  3.9     07-25  Mg     2.0     07-25    TPro  6.8  /  Alb  3.0<L>  /  TBili  0.3  /  DBili  x   /  AST  555<H>  /  ALT  580<H>  /  AlkPhos  386<H>  07-25          MICROBIOLOGY:  v  .Blood Blood  07-15-22   No Growth Final  --  --      .Blood Blood  07-15-22   No Growth Final  --  --      Clean Catch Clean Catch (Midstream)  07-13-22   50,000 - 99,000 CFU/mL Klebsiella pneumoniae ESBL  --  Klebsiella pneumoniae ESBL      .CSF CSF  06-29-22   No growth at 1 week.  --    No polymorphonuclear leukocytes seen  No organisms seen  by cytocentrifuge      .Other Other  06-28-22   No Nocardia isolated  --  --      .Blood Blood-Peripheral  06-27-22   No Growth Final  --  --      Combi-Cath Combi-Cath  06-26-22   Normal Respiratory Rachel present  --    Few polymorphonuclear leukocytes per low power field  Rare Squamous Epithelial Cells per low power field  Rare Gram Positive Rods per oil power field      .Blood Blood-Peripheral  06-26-22   No Growth Final  --  --      .Blood Blood-Peripheral  06-26-22   Growth in anaerobic bottle: Staphylococcus epidermidis  Coag Negative Staphylococcus  Single set isolate, possible contaminant. Contact  Microbiology if susceptibility testing clinically  indicated.  ***Blood Panel PCR results on this specimen are available  approximately 3 hours after the Gram stain result.***  Gram stain, PCR, and/or culture results may not always  correspond due to difference in methodologies.  ************************************************************  This PCR assay was performed by multiplex PCR. This  Assay tests for 66 bacterial and resistance gene targets.  Please refer to the [facility] test directory  at [email or web address] for details.  --  Blood Culture PCR      Catheterized Catheterized  06-26-22   No growth  --  --          Rapid RVP Result: NotDetec (07-25 @ 15:15)  CMVPCR Log: NotDetec [id number]/mL (07-22 @ 06:18)        RADIOLOGY:    <The imaging below has been reviewed and visualized by me independently. Findings as detailed in report below>    < from: US Abdomen Upper Quadrant Right (07.25.22 @ 10:35) >  IMPRESSION:  No evidence of portal vein thrombosis, as clinically questioned.   Pulsatility of the portal venous waveform, which may be on the basis of   right heart failure.    Portal vasculature is patent with normal direction of flow.    Hepatomegaly.    < end of copied text >

## 2022-07-25 NOTE — PROGRESS NOTE ADULT - CRITICAL CARE ATTENDING COMMENT
65 yo male with very complex hospital course, significant for s/p DDKT and DGF. Now admitted to SICU for AMS, leukopenia, elevated LFTs and creatinine. Clinical picture consistent with severe sepsis in setting of immunosuppression with organ dysfunction.  - H/o seizures, continue with phenytoin, neurology following. Monitor mental status.  - Incentive spirometer, pulmonary toilet. CT chest, evaluate for potential source.  - Elevated LFTs, d/c statin and bactrim, neurology determining if this could be related to phenytoin. Hepatology consult. US negative for thrombosis. CT AP for evaluation of potential source.  - Epogen for anemia.  - Neupogen for leukopenia.  - Recent course of ertapenem, start meropenem and vancomycin. ID following.  - 1U PRBC for anemia.  - Discussed with Dr Tena and transplant team.

## 2022-07-25 NOTE — PROGRESS NOTE ADULT - SUBJECTIVE AND OBJECTIVE BOX
HISTORY  66y Male with PMH of DM type II, HTN, CAD s/p CABG in 2020, AFib on Eliquis, CVA in 2019 due to Afib, Seizure d/o following CVA, last episode was on 4/8/22, h/o GIB in 2/2022 EGD with duodenal ulcer.  ESRD due to DM was on HD since 2019. Pt is s/p R DCD DDRT on 4/21/22.  Course complicated by DGF, was on HD until 4/29/22. Re-admitted in May for anemia in the setting of large perinephric hematoma s/p evacuation and repair of arterial anastomosis on 5/2/22. Intra operative biopsy showed no rejection, Creatinine ranging ~2mg/dL.     In Rehab: He was diagnosed with Klebsiella UTI, treated with ertapenem - then switched to Levaquin day #6.  He also had parainfluenza pneumonia. Was at rehab progressing well.  Then was readmitted on 6/10 for status epilepticus, intubated for airway protection, and transferred to MICU.     Hospital course:  - 6/10: transferred from rehab facility for seizure status epilepticus. Intubated for respiratory protection and transferred to MICU.  EEG showed no seizure activity. Neuro consulted.   - 6/11: Extubated. Found to have R pleural effusion. s/p Thoracentesis 1.3L. Eliquis changed to heparin drip.  Post procedure drop in H&H. 5u PRBC, 2 u FFP.   - 6/11 evening: Transferred to SICU from MICU for further care in setting of hypoxia, significant R pleural effusion, anemia and hemodynamic instability  - 6/11 Intubated at 9pm and sedated on Precedex.  CT placed, 600ml blood drained.  1L total overnight, started pressors  - 6/11 Received Protamine for PTT >100 (was on Heparin drip started for AF), 2 u FFP and 5u PRBC total  - 6/13 s/p VATS 2.2L old blood removed; second chest tube placed  - 6/18-19: chest tubes removed  - 6/21: ?PRES vs. chronic ischemic changes on MRI, off Envarsus, switched to Belatacept 6/23 with good graft function  - 6/25: Tx to SICU for refractory seizures, improved with IV antiepileptic regimen  - 7/10: Passed bedside S&S in SICU. Downgraded from SICU to floor.   - 7/11: OR-->URETERAL STENT REMOVED  - 7/15: ESBL Kleb UTI (50-90K), completed Ertapenem x 3 Days  - 7/16: Cellcept dec to 500 (infection). DCed atovaquone, started bactrim  - 7/18: Belatacept.  mild ANA.  - 7/23: MMF HELD for worsening leukopenia to 1.97. Neupogen x1.  1L fluid restriction for hypoNa. CMV neg.  - 7/24: rising SCr, NS 500cc bolus x 1   - 7/25: readmitted to SICU for leukopenia, rising LFTs, rising creatinine        SUBJECTIVE/ROS:  [x ] A ten-point review of systems was otherwise negative except as noted.  [ ] Due to altered mental status/intubation, subjective information were not able to be obtained from the patient. History was obtained, to the extent possible, from review of the chart and collateral sources of information.      NEURO  RASS:     GCS:     [facility] ICU:  Exam:   Meds: acetaminophen     Tablet .. 650 milliGRAM(s) Oral every 6 hours PRN Mild Pain (1 - 3)  melatonin 6 milliGRAM(s) Oral at bedtime  phenytoin   Capsule 100 milliGRAM(s) Oral every 8 hours    [x] Adequacy of sedation and pain control has been assessed and adjusted      RESPIRATORY  RR: 21 (07-25-22 @ 13:00) (18 - 21)  SpO2: 97% (07-25-22 @ 13:00) (97% - 99%)  Wt(kg): --  Exam: unlabored, clear to auscultation bilaterally  Mechanical Ventilation:     [ ] Extubation Readiness Assessed  Meds:       CARDIOVASCULAR  HR: 61 (07-25-22 @ 13:00) (61 - 83)  BP: 118/55 (07-25-22 @ 13:00) (118/55 - 164/70)  BP(mean): 82 (07-25-22 @ 13:00) (82 - 98)  ABP: --  ABP(mean): --  Wt(kg): --  CVP(cm H2O): --      Exam:  Cardiac Rhythm:  Perfusion     [ ]Adequate   [ ]Inadequate  Mentation   [ ]Normal       [ ]Reduced  Extremities  [ ]Warm         [ ]Cool  Volume Status [ ]Hypervolemic [ ]Euvolemic [ ]Hypovolemic  Meds: carvedilol 25 milliGRAM(s) Oral every 12 hours  doxazosin 4 milliGRAM(s) Oral <User Schedule>  NIFEdipine XL 60 milliGRAM(s) Oral two times a day        GI/NUTRITION  Exam:  Diet:  Meds: pantoprazole    Tablet 40 milliGRAM(s) Oral daily  polyethylene glycol 3350 17 Gram(s) Oral daily      GENITOURINARY  I&O's Detail    07-24 @ 07:01  -  07-25 @ 07:00  --------------------------------------------------------  IN:    Oral Fluid: 720 mL  Total IN: 720 mL    OUT:    Voided (mL): 750 mL  Total OUT: 750 mL    Total NET: -30 mL      07-25 @ 07:01 - 07-25 @ 14:25  --------------------------------------------------------  IN:    Oral Fluid: 240 mL  Total IN: 240 mL    OUT:  Total OUT: 0 mL    Total NET: 240 mL          07-25    128<L>  |  98  |  81<H>  ----------------------------<  180<H>  5.3   |  15<L>  |  2.22<H>    Ca    8.7      25 Jul 2022 11:51  Phos  3.9     07-25  Mg     2.0     07-25    TPro  6.8  /  Alb  3.0<L>  /  TBili  0.3  /  DBili  x   /  AST  555<H>  /  ALT  580<H>  /  AlkPhos  386<H>  07-25    [ ] Miranda catheter, indication: N/A  Meds: sodium bicarbonate 1300 milliGRAM(s) Oral <User Schedule>        HEMATOLOGIC  Meds: enoxaparin Injectable 60 milliGRAM(s) SubCutaneous <User Schedule>    [x] VTE Prophylaxis                        7.8    2.94  )-----------( 391      ( 25 Jul 2022 06:29 )             24.5     PT/INR - ( 25 Jul 2022 06:30 )   PT: 16.6 sec;   INR: 1.44 ratio         PTT - ( 25 Jul 2022 06:30 )  PTT:40.7 sec  Transfusion     [ ] PRBC   [ ] Platelets   [ ] FFP   [ ] Cryoprecipitate      INFECTIOUS DISEASES  T(C): 38.3 (07-25-22 @ 13:00), Max: 38.3 (07-25-22 @ 13:00)  Wt(kg): --  WBC Count: 2.94 K/uL (07-25 @ 06:29)    Recent Cultures:    Meds: epoetin cortney-epbx (RETACRIT) Injectable 76974 Unit(s) SubCutaneous every 7 days  meropenem  IVPB      meropenem  IVPB 1000 milliGRAM(s) IV Intermittent once  nystatin    Suspension 560501 Unit(s) Oral four times a day  vancomycin  IVPB 1000 milliGRAM(s) IV Intermittent once        ENDOCRINE  Capillary Blood Glucose    Meds: finasteride 5 milliGRAM(s) Oral daily  insulin glargine Injectable (LANTUS) 5 Unit(s) SubCutaneous at bedtime  insulin lispro (ADMELOG) corrective regimen sliding scale   SubCutaneous three times a day with meals  insulin lispro (ADMELOG) corrective regimen sliding scale   SubCutaneous at bedtime  insulin lispro Injectable (ADMELOG) 4 Unit(s) SubCutaneous three times a day before meals  levothyroxine 50 MICROGram(s) Oral daily  predniSONE  Solution 5 milliGRAM(s) Oral daily        ACCESS DEVICES:  [ ] Peripheral IV  [ ] Central Venous Line	[ ] R	[ ] L	[ ] IJ	[ ] Fem	[ ] SC	Placed:   [ ] Arterial Line		[ ] R	[ ] L	[ ] Fem	[ ] Rad	[ ] Ax	Placed:   [ ] PICC:					[ ] Mediport  [ ] Urinary Catheter, Date Placed:   [ ] Necessity of urinary, arterial, and venous catheters discussed    OTHER MEDICATIONS:  chlorhexidine 2% Cloths 1 Application(s) Topical <User Schedule>      CODE STATUS:     IMAGING: HISTORY  66y Male with PMH of DM type II, HTN, CAD s/p CABG in 2020, AFib on Eliquis, CVA in 2019 due to Afib, Seizure d/o following CVA, last episode was on 4/8/22, h/o GIB in 2/2022 EGD with duodenal ulcer.  ESRD due to DM was on HD since 2019. Pt is s/p R DCD DDRT on 4/21/22.  Course complicated by DGF, was on HD until 4/29/22. Re-admitted in May for anemia in the setting of large perinephric hematoma s/p evacuation and repair of arterial anastomosis on 5/2/22. Intra operative biopsy showed no rejection, Creatinine ranging ~2mg/dL.     In Rehab: He was diagnosed with Klebsiella UTI, treated with ertapenem - then switched to Levaquin day #6.  He also had parainfluenza pneumonia. Was at rehab progressing well.  Then was readmitted on 6/10 for status epilepticus, intubated for airway protection, and transferred to MICU.     Hospital course:  - 6/10: transferred from rehab facility for seizure status epilepticus. Intubated for respiratory protection and transferred to MICU.  EEG showed no seizure activity. Neuro consulted.   - 6/11: Extubated. Found to have R pleural effusion. s/p Thoracentesis 1.3L. Eliquis changed to heparin drip.  Post procedure drop in H&H. 5u PRBC, 2 u FFP.   - 6/11 evening: Transferred to SICU from MICU for further care in setting of hypoxia, significant R pleural effusion, anemia and hemodynamic instability  - 6/11 Intubated at 9pm and sedated on Precedex.  CT placed, 600ml blood drained.  1L total overnight, started pressors  - 6/11 Received Protamine for PTT >100 (was on Heparin drip started for AF), 2 u FFP and 5u PRBC total  - 6/13 s/p VATS 2.2L old blood removed; second chest tube placed  - 6/18-19: chest tubes removed  - 6/21: ?PRES vs. chronic ischemic changes on MRI, off Envarsus, switched to Belatacept 6/23 with good graft function  - 6/25: Tx to SICU for refractory seizures, improved with IV antiepileptic regimen  - 7/10: Passed bedside S&S in SICU. Downgraded from SICU to floor.   - 7/11: OR-->URETERAL STENT REMOVED  - 7/15: ESBL Kleb UTI (50-90K), completed Ertapenem x 3 Days  - 7/16: Cellcept dec to 500 (infection). DCed atovaquone, started bactrim  - 7/18: Belatacept.  mild ANA.  - 7/23: MMF HELD for worsening leukopenia to 1.97. Neupogen x1.  1L fluid restriction for hypoNa. CMV neg.  - 7/24: rising SCr, NS 500cc bolus x 1   - 7/25: readmitted to SICU for leukopenia, rising LFTs, rising creatinine        SUBJECTIVE/ROS:  [x ] A ten-point review of systems was otherwise negative except as noted.  [ ] Due to altered mental status/intubation, subjective information were not able to be obtained from the patient. History was obtained, to the extent possible, from review of the chart and collateral sources of information.      NEURO  RASS:  0     Exam: awake, alert, oriented x 3 (self, place, year; wrong about month/day)  Meds: acetaminophen     Tablet .. 650 milliGRAM(s) Oral every 6 hours PRN Mild Pain (1 - 3)  melatonin 6 milliGRAM(s) Oral at bedtime  phenytoin   Capsule 100 milliGRAM(s) Oral every 8 hours    [x] Adequacy of sedation and pain control has been assessed and adjusted      RESPIRATORY  RR: 21 (07-25-22 @ 13:00) (18 - 21)  SpO2: 97% (07-25-22 @ 13:00) (97% - 99%)  Exam:  clear to auscultation bilaterally  Meds: x      CARDIOVASCULAR  HR: 61 (07-25-22 @ 13:00) (61 - 83)  BP: 118/55 (07-25-22 @ 13:00) (118/55 - 164/70)  BP(mean): 82 (07-25-22 @ 13:00) (82 - 98)    Exam: regular rate and rhythm  Cardiac Rhythm: sinus rhythm  Perfusion     [ x]Adequate   [ ]Inadequate  Mentation   [ x]Normal       [ ]Reduced  Extremities  [x ]Warm         [ ]Cool  Volume Status [ ]Hypervolemic [ x]Euvolemic [ ]Hypovolemic  Meds: carvedilol 25 milliGRAM(s) Oral every 12 hours  doxazosin 4 milliGRAM(s) Oral <User Schedule>  NIFEdipine XL 60 milliGRAM(s) Oral two times a day        GI/NUTRITION  Exam: softly distended, nontender  Diet: Regular  Meds: pantoprazole    Tablet 40 milliGRAM(s) Oral daily  polyethylene glycol 3350 17 Gram(s) Oral daily      GENITOURINARY  I&O's Detail    07-24 @ 07:01 - 07-25 @ 07:00  --------------------------------------------------------  IN:    Oral Fluid: 720 mL  Total IN: 720 mL    OUT:    Voided (mL): 750 mL  Total OUT: 750 mL    Total NET: -30 mL      07-25 @ 07:01 - 07-25 @ 14:25  --------------------------------------------------------  IN:    Oral Fluid: 240 mL  Total IN: 240 mL    OUT:  Total OUT: 0 mL    Total NET: 240 mL          07-25    128<L>  |  98  |  81<H>  ----------------------------<  180<H>  5.3   |  15<L>  |  2.22<H>    Ca    8.7      25 Jul 2022 11:51  Phos  3.9     07-25  Mg     2.0     07-25    TPro  6.8  /  Alb  3.0<L>  /  TBili  0.3  /  DBili  x   /  AST  555<H>  /  ALT  580<H>  /  AlkPhos  386<H>  07-25    [ n/a] Miranda catheter, indication: N/A  Meds: sodium bicarbonate 1300 milliGRAM(s) Oral <User Schedule>        HEMATOLOGIC  Meds: enoxaparin Injectable 60 milliGRAM(s) SubCutaneous <User Schedule>    [x] VTE Prophylaxis                        7.8    2.94  )-----------( 391      ( 25 Jul 2022 06:29 )             24.5     PT/INR - ( 25 Jul 2022 06:30 )   PT: 16.6 sec;   INR: 1.44 ratio         PTT - ( 25 Jul 2022 06:30 )  PTT:40.7 sec  Transfusion     [ ] PRBC   [ ] Platelets   [ ] FFP   [ ] Cryoprecipitate      INFECTIOUS DISEASES  T(C): 38.3 (07-25-22 @ 13:00), Max: 38.3 (07-25-22 @ 13:00)  WBC Count: 2.94 K/uL (07-25 @ 06:29)    Recent Cultures:    Meds: epoetin cortney-epbx (RETACRIT) Injectable 29184 Unit(s) SubCutaneous every 7 days  meropenem  IVPB      meropenem  IVPB 1000 milliGRAM(s) IV Intermittent once  nystatin    Suspension 213287 Unit(s) Oral four times a day  vancomycin  IVPB 1000 milliGRAM(s) IV Intermittent once        ENDOCRINE  CAPILLARY BLOOD GLUCOSE  POCT Blood Glucose.: 189 mg/dL (25 Jul 2022 11:32)  POCT Blood Glucose.: 155 mg/dL (25 Jul 2022 08:38)  POCT Blood Glucose.: 114 mg/dL (24 Jul 2022 21:11)  POCT Blood Glucose.: 188 mg/dL (24 Jul 2022 17:33)    Meds: finasteride 5 milliGRAM(s) Oral daily  insulin glargine Injectable (LANTUS) 5 Unit(s) SubCutaneous at bedtime  insulin lispro (ADMELOG) corrective regimen sliding scale   SubCutaneous three times a day with meals  insulin lispro (ADMELOG) corrective regimen sliding scale   SubCutaneous at bedtime  insulin lispro Injectable (ADMELOG) 4 Unit(s) SubCutaneous three times a day before meals  levothyroxine 50 MICROGram(s) Oral daily  predniSONE  Solution 5 milliGRAM(s) Oral daily        ACCESS DEVICES:  [x ] Peripheral IV  [ ] Central Venous Line	[ ] R	[ ] L	[ ] IJ	[ ] Fem	[ ] SC	Placed:   [ ] Arterial Line		[ ] R	[ ] L	[ ] Fem	[ ] Rad	[ ] Ax	Placed:   [ ] PICC:					[ ] Mediport  [ ] Urinary Catheter, Date Placed:   [x ] Necessity of urinary, arterial, and venous catheters discussed    OTHER MEDICATIONS:  chlorhexidine 2% Cloths 1 Application(s) Topical <User Schedule>      CODE STATUS: full code    IMAGING: < from: US Abdomen Upper Quadrant Right (07.25.22 @ 10:35) >      IMPRESSION:  No evidence of portal vein thrombosis, as clinically questioned.   Pulsatility of the portal venous waveform, which may be on the basis of   right heart failure.    Portal vasculature is patent with normal direction of flow.    Hepatomegaly.      < from: US Trans Kidney w/ Doppler, Right (07.25.22 @ 10:35) >    IMPRESSION:  *  No evidence of a significant renal artery stenosis.  *  Persistent mild elevation of the resistive indices.  *  Trace peritransplant free fluid.

## 2022-07-25 NOTE — CONSULT NOTE ADULT - ASSESSMENT
66 yr old Male with PMHx ESRD s/p permacath removal 5/10/22 s/p Rt DDRT 4/21/22,  CVA (2019), Afib on apixaban, seizure activity, CAD s/p stents, CABG (2020), HTN, HLD, DM on insulin, gastric/duodenal ulcer, recent hospitalization 4/28/22 -5/10/22 with weakness/anemia found to have perinephric hematoma requiring evacuation and repair of bleeding arterial anastomosis presenting with status epilepticus requiring intubation for hypoxic respiratory  failure. Hospital course complicated with Pleural effusion requiring thoracentesis, hemothorax and hemorrhagic shock following right thoracentesis done on 6/10 for effusion s/p chest tube, followed by VATS, ESBL UTI tx with Ertapenem, and fluctuating Cr with increase in liver enzymes for which Transplant Hepatology was consulted.     #Hepatocellular liver injury:  -Patient with normal liver enzymes with AST uptrending to 625 and ALT to 596. Although Alk phos up to 396, Bili normal. Suspect DILI (possibly culprit includes Hydralazine, Atorvastatin, Bactrim and Phenytoin) vs Reactivation of Hep B virus. Although patient with low grade fever, low suspicion for sepsis cholestasis.     Recommendations  - Obtain Hep B PCR  - Agree to hold Atorvastatin and Bactrim.   - Would hold Hydralazine and Phenytoin if feasible (especially if there is an alterative medication)  - trend CMP and INR daily    Recommendations preliminary until signed by attending.     Yovanny Arriaga MD  Gastroenterology/Hepatology Fellow  1st option: [phone] (text or call), ONLY available from 7:00 am to 5:00 pm.   **Contact on-call GI fellow via answering service ([phone]) from 5pm-7am AND on weekends/holidays**  2nd option: Available via [employer] Teams  3rd option: Pager: [phone]           66 yr old Male with PMHx ESRD s/p permacath removal 5/10/22 s/p Rt DDRT 4/21/22,  CVA (2019), Afib on apixaban, seizure activity, CAD s/p stents, CABG (2020), HTN, HLD, DM on insulin, gastric/duodenal ulcer, recent hospitalization 4/28/22 -5/10/22 with weakness/anemia found to have perinephric hematoma requiring evacuation and repair of bleeding arterial anastomosis presenting with status epilepticus requiring intubation for hypoxic respiratory  failure. Hospital course complicated with Pleural effusion requiring thoracentesis, hemothorax and hemorrhagic shock following right thoracentesis done on 6/10 for effusion s/p chest tube, followed by VATS, ESBL UTI tx with Ertapenem, and fluctuating Cr with increase in liver enzymes for which Transplant Hepatology was consulted.     #Hepatocellular liver injury:  -Patient with normal liver enzymes with AST uptrending to 625 and ALT to 596. Although Alk phos up to 396, Bili normal. Suspect DILI (possibly culprit includes Hydralazine, Atorvastatin, Bactrim and Phenytoin) vs Reactivation of Hep B virus. Although patient with low grade fever, low suspicion for sepsis cholestasis.     Recommendations  - Obtain Hep B PCR  - Agree to hold Atorvastatin and Bactrim.   - Would hold Hydralazine and Phenytoin if feasible (especially if there is an alterative medication)  - trend CMP and INR daily    Recommendations preliminary until signed by attending.     Yovanny Arriaga MD  Gastroenterology/Hepatology Fellow  1st option: [phone] (text or call), ONLY available from 7:00 am to 5:00 pm.   **Contact on-call GI fellow via answering service ([phone]) from 5pm-7am AND on weekends/holidays**  2nd option: Available via Microsoft Teams  3rd option: Pager: [phone]

## 2022-07-25 NOTE — CONSULT NOTE ADULT - ATTENDING COMMENTS
65 yo M with HTN, HLD, IDDM, PUD, CAD (with prior stents and CABG), AF (on AC), hx CVA (2019), seizure disorder, and ESRD s/p DDRT (4/21/22), with complicated post-transplant course including DGF requiring HD until 4/29/22, perinephric hematoma (s/p evacuation and repair of bleeding arterial anastomosis 5/2/22), status epilepticus (6/10/22 and 6/25/22), acute hypoxic respiratory failure due to hemothorax (s/p VATS 6/13/22), and ESBL Klebsiella UTI (7/15/22), also with concern for possible PRES (off Envarsus and switched to belatacept 6/23/22), now transferred back to SICU due to SIRS with fevers and leukopenia as well as worsening renal function and an acute liver injury.    Transplant Hepatology was consulted due to his acute liver injury that appeared to have peaked this morning with , , and , without hyperbilirubinemia. Repeat liver tests this afternoon and evening are slightly improved. He previously had normal liver enzymes as recently as 7/22.     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 Additionally, suggest holding hydralazine for now. If his liver tests worsen, may also need to discontinue phenytoin (started 1 month ago) though he does not currently have peripheral eosinophilia or other hypersensitivity features typical for phenytoin toxicity.    Differential diagnosis also includes acute infection. Recent CMV PCR was negative (7/22/22). Recommend to check HBV PCR given that he is potentially at risk of HBV reactivation due to prior exposure (HBsAg-, HBcAb+, and HBsAb+ pre-transplant) and in the context of his post-transplant immunosuppression. He is HAV immune.    The above recommendations were discussed with transplant surgeon Dr. Daniel Tena. We will continue to follow. Please don't hesitate to call with any questions or concerns.    Xander Vo M.D., Ph.D.  Transplant Hepatology

## 2022-07-25 NOTE — PROGRESS NOTE ADULT - ASSESSMENT
66 yr old Male with PMHx ESRD s/p permacath removal 5/10/22 s/p Rt DDRT 4/21/22,  CVA (2019), Afib on apixaban, seizure activity, CAD s/p stents, CABG (2020), HTN, HLD, DM on insulin, gastric/duodenal ulcer, recent hospitalization 4/28/22 -5/10/22 with weakness/anemia found to have perinephric hematoma requiring evacuation and repair of bleeding arterial anastomosis. Curently being treated for UTI with Levaquin Today after developing multiple sz episodes refractory to 5 mg versed IM (EMS) and 2 mg ativan IV in E.D. concerning for status epilepticus requiring intubation for hypoxic respiratory  failure. MICU Consult was called for hypoxic respiratory failure secondary to status epilepticus.  Nephrology consulted for renal failure.     A/P  DDRT on 04/21/22  Course complicated by DGF, was on HD until 4/29/22. Readmitted in May for anemia in the setting of large perinephric hematoma s/p evacuation and repair of arterial anastomosis on 5/2/22. Intra operative biopsy showed no rejection.  ANA was initially sec to  hemodynamic change   stent removal 07/12/22  scr trending up,   follow up UA, urine and blood cultures   transplant team on board   allograft US, DSA negative   monitor BMP, U/O     HYponatremia   avoid hypotonic solution   optimize glucose   monitor BMP closely     History of Hyperkalemia  Secondary to Bactrim now resolved off Bactrim.    HTN   optimal   manage by transplant team    monitor BP closely     UTI  Urine culture with ESBL E.coli.  cefepime changed to ertapenem.     Immunosuppression per transplant team  Induction:  Thymoglobulin           Envarsus discontinued for possible neurotoxicity. Continue     Belatacept, next dose on 7/18      MMF 500mg po BID decreased because of cytopenia and UTI      Prednisone 5mg po daily       For PCP prophylaxis,   Transplant team changed atovaquone to bactrim as hyperkalemia has resolved,     CMV prophylaxis.   continue Valcyte 450mg po daily (CMV intermediate risk)

## 2022-07-25 NOTE — PROVIDER CONTACT NOTE (OTHER) - ACTION/TREATMENT ORDERED:
Yvonne HAAS assessed pt at bedside; ABG ordered; 500mg Tylenol administered; No other interventions ordered at this time; Will continue to monitor and notify provider for any worsening resp status

## 2022-07-25 NOTE — PROGRESS NOTE ADULT - ASSESSMENT
66 yr old man with h/o ESRD due to DM on HD since 2019, s/p DDRT from DCD donor on 4/21/22 complicated by DGF requiring HD until 4/29/22 now admitted with seizures/status epilepticus    1. S/p DCD DDRT on 4/21/22  - creatinine slowly uptrending to 1.5. Electrolytes and volume status fair. Transplant ureteric stent removed 7/12. Renal sonogram done on 7/19 showed no hydronephrosis. MARA was not detected on US. DSA sent on 7/19/22 were negative. Pt received IVF (500 cc NS) on 7/20/22. SCr improved initially now again uptrending to 2.13 today. Pt with fevers and worsening renal function. Check UA, urine and blood cultures. Start Vanco/Cepemine empirically and adjust as per sensitivity. ID consult. Check CT A/p without contrast to rule out intraabdominal infectious process. Check serum BK PCR. Repeat labs later today. Monitor for fevers/labs.    Transaminitis: Pt with uptrending LFTs past 48 hours. Lipitor and Bactrim discontinued. Check RUQ US and CT A/P. Hepatology consulted. Monitor LFTs.    2. Immunosuppression - Envarsus discontinued for possible neurotoxicity, seizures, PRES. Continue Belatacept, last dose on 7/18. Next dose on 8/1/22. (hold for now)   Reduced MMF to 250mgs BID for progressive leukopenia.      Continue Prednisone 5mg po daily      Infection prophylaxis - bactrim ss daily discontinued due to elevated LFTs.   continue holding valcyte 450mg daily (CMV intermediate risk) for now given leucopenia,CMV pcr negative.     3. Seizure d/o -admitted with status epilepticus .Corrected phenytoin level 3.7 (7/21). As per neurology to continue current dose of phenytoin.  Dose being adjusted as per levels. Neurology following.      4. HTN:  BP uncontrolled. On Hydralazine 100mg q8, Nifedipine 60mg po bid, Doxazosin 4mg PO BID. Continue Coreg 25 mg BID. Monitor BP. Discontinue Hydralazine given worsening renal function and concerns for hydralazine induced renal disfunction.      5. BPH with urine retention - continue Proscar and doxazosin. Recent bladder scan shows minimal PVR. Monitor UOP.    6. Cytopenia -  likely med induced.  Cont epo. s/p Filgrastim on 7/23/22.     7. Atrial fibrillation - was on Eliquis, now on therapeutic Lovenox due to drug interaction with fosphenytoin. On Lovenox for AC. Family comfortable with continuing with Lovenox for now rather than coumadin. Lovenox dose now adusted to daily dosing due to decreased egfr.    8. ESBL Klebsiella UTI - Completed ertapenem x 3 days total per transplant ID.    9. Hyponatremia: SNa slowly downtrending to 129 on 7/18. Urine studies reviewed. SNa stable at 128 today. Fluid restriction 1L. Monitor SNa. Avoid hypotonic fluids.     10. Anemia: Pt with stable low Hb.  Pt on lovenox. No occult Gi bleed. Continue epogen. Monitor H/H.

## 2022-07-25 NOTE — PROGRESS NOTE ADULT - TIME BILLING
Kidney Transplant recipient with functioning allograft  ANA, up trending creatinine  Fever under evaluation  Abnormal LFT, Anemia  Creatinine trend noted  Comorbidities reviewed.  Patient seen, examined and reviewed available clinical and lab data including history,  progress notes and consult notes.  Reviewed immunosuppression and allograft function including urine out put, creatinine trend, urine studies and any allograft and bladder imaging.  Reviewed medication regimen for comorbidities  Suggestions:  1. Blood, urine culture  2. Abdominal imaging- Non contrast CT, Post void bladder volume, allograft sonogram,   3. Broad spectrum antibiotic coverage, Transplant ID consult  4. Adjust dose of Lovenox, for e GFR  5. Hold potential hepatotoxic medications- statin, Bactrim, hold hydralazine  Will follow  I was present during and reviewed clinical and lab data as well as assessment and plan as documented by the house staff as noted. Please contact if any additional questions with any change in clinical condition or on availability of any additional information or reports. Kidney Transplant recipient with functioning allograft  ANA, up trending creatinine  Fever under evaluation  Abnormal LFT, Anemia  Creatinine trend noted  Comorbidities reviewed.  Patient seen, examined and reviewed available clinical and lab data including history,  progress notes and consult notes.  Reviewed immunosuppression and allograft function including urine out put, creatinine trend, urine studies and any allograft and bladder imaging.  Noted Belatacept based regimen.  Reviewed medication regimen for comorbidities  Suggestions:  1. Blood, urine culture  2. Abdominal imaging- Non contrast CT, Post void bladder volume, allograft sonogram,   3. Broad spectrum antibiotic coverage, Transplant ID consult  4. Adjust dose of Lovenox, for e GFR  5. Hold potential hepatotoxic medications- statin, Bactrim, hold hydralazine  Will follow  I was present during and reviewed clinical and lab data as well as assessment and plan as documented by the house staff as noted. Please contact if any additional questions with any change in clinical condition or on availability of any additional information or reports.

## 2022-07-25 NOTE — PROVIDER CONTACT NOTE (OTHER) - ASSESSMENT
Pt increasingly tachypneic with increased work of breathing, dyspnea with minimal exertion, and expiratory wheezing noted; Pt diaphoretic and febrile to 101.4F oral at this time; All other VS stable; Pt on room air - b/l lung sounds coarse - no productive cough noted.

## 2022-07-26 NOTE — PROGRESS NOTE ADULT - PROBLEM SELECTOR PLAN 1
-Test BG AC/HS   -C/w Lantus 5 units at bedtime.    -C/w Admelog 4 units tid with meals ensure pt tolerating meal prior to administration to prevent hypoglycemia, If prandial BG ac meals consistently >180s increase to 5 units with meals  -C/w low Admelog  correction scale AC meals and Low HS scale  -Please inform endo team of any changes on steroid therapy or PO/TF status  Discharge  plan to rehab, c/w basal bolus insulin final doses as above if FBG at goal  Titrate further at rehab as necessary .   Outpatient f/u with Endocrinologist Dr. Lincoln. [address]. Phone  Needs apt at time of discharge  -Needs optho/renal/cardiac f/u as out pt  -Make sure pt has insulin and DM supplies at time of discharge.

## 2022-07-26 NOTE — PROGRESS NOTE ADULT - NUTRITIONAL ASSESSMENT
This patient has been assessed with a concern for Malnutrition and has been determined to have a diagnosis/diagnoses of Severe protein-calorie malnutrition.    This patient is being managed with:   Diet Consistent Carbohydrate w/Evening Snack-  1200mL Fluid Restriction ([id number])  Supplement Feeding Modality:  Oral  Nepro Cans or Servings Per Day:  2       Frequency:  Daily  Entered: Jul 25 2022 10:54AM

## 2022-07-26 NOTE — PROGRESS NOTE ADULT - ASSESSMENT
65yo man with PMH of CVA (2019) with subsequent seizure disorder, ESRD s/p renal transplant (04/2022), afib (on apixaban), CAD (s/p stents), CABG (2020), HTN, HLD, DM presents to the ED with status epilepticus from [facility]. Semiology includes eyes deviated to the R, rhythmic R facial twitching as well as R shoulder clonic movements lasting approximately 30 seconds each. EEG from 04/2022 showed L frontocentral focal onset seizures. S/p intubation on 6/11. EEG negative for seizure but showed structural or functional abnormality in the left fronto-temporal region and moderate to severe nonspecific diffuse or multifocal cerebral dysfunction. RRT called 6/21 for decreased movement of RUE but with increased tone and tremor like activity/ clonus and diffuse increased tone and increased encephalopathy concerning for seizure. Course then complicated by EEG 6/24 showing L frontotemporal/ frontal seizures. Was on keppra, valproic acid, vimpat but had seizures through the prior two medications. Stopped vimpat, valproic acid, standing ativan, and fycompa. Now only on phenytoin.    Last EEG 7/7:  Infrequent left posterior sharp discharges, Left frontotemporal slowing    Impression: History of recent status epilepticus that was refractory, now stable on phenytoin. Mental status currently greatly improved possibly from hospital-induced delirium with toxic-metabolic encephalopathy. LFTs uptrended, now downtrending, with phenytoin level 1.5; elevated LFTs unlikely as phenytoin hepatotoxicity is dose-dependent.    Recommendations:  [] Continue phenytoin 100mg q8h  [] no need to check phenytoin levels any further  [] monitor clinically for seizures and only if patient has a seizure may check phenytoin level  [] From stroke standpoint, continue anticoagulation for secondary stroke prevention given afib hx if not otherwise contraindicated (given potential interaction with eliquis and phenytoin, agree with either lovenox or coumadin for anticoagulation).  [x] CSF without evidence of infection, inflammation  [x] Vitamin B12 1157, folate >20, homocysteine 13.4, Vitamin B1 160  [x] TSH 4.6, T3 3.9/T4 43 - correction of hypothyroidism per primary team and endocrinology  [x] BP goals of normotension   [x] MR brain without clear evidence of infection, inflammation, or acute CNS event (possibly minimal enhancement). Although prior study read as possible PRES, most recent MRI seems more consistent with significant chronic ischemic microvascular disease given no associated DWI changes or obvious enhancement  [x] Telemetry monitoring  [x] Neuro checks and vital signs per unit protocol  [x] Seizure/fall/aspiration precautions  [] please have adequate sleeping environment for the patient, light on, curtains open, TV on during the day with regular stimulation and out of bed to chair if possible. During the night, close curtains, TV off, lights off, and minimize interruptions (limit blood draws and vital sign checks if possible). Regular interaction with patient with staff (introducing selves), frequent reorientation, and encouragement of visitors as allowed by hospital and unit policy. Regular toileting.  [] if needed for sleep may administer melatonin 3mg  [] limitation of sedating medications as tolerated  [] maintain electrolytes within normal limits  [/] Advise against driving, operating heavy machinery, water sports/bathing, activity in elevation without appropriate safety precautions  [ ] outpatient EMG/ NCS    Patient seen and discussed with neurology attending, Dr. Maria.

## 2022-07-26 NOTE — PROGRESS NOTE ADULT - NUTRITIONAL ASSESSMENT
This patient has been assessed with a concern for Malnutrition and has been determined to have a diagnosis/diagnoses of Severe protein-calorie malnutrition.    This patient is being managed with:   Diet Consistent Carbohydrate w/Evening Snack-  1200mL Fluid Restriction ([id number])  Supplement Feeding Modality:  Oral  Nepro Cans or Servings Per Day:  2       Frequency:  Daily  Entered: Jul 25 2022 10:54AM

## 2022-07-26 NOTE — PROGRESS NOTE ADULT - NSPROGADDITIONALINFOA_GEN_ALL_CORE
-Plan discussed with pt/team.  Contact info: [phone] (24/7). pager [phone]  [email or web address] password [email or web address]  Spent 26 minutes assessing pt/labs/meds and discussing plan of care with primary team  Adjusting insulin  Discharge plan  Follow up care

## 2022-07-26 NOTE — PROGRESS NOTE ADULT - ASSESSMENT
66 yr old Male with PMHx ESRD s/p permacath removal 5/10/22 s/p Rt DDRT 4/21/22,  CVA (2019), Afib on apixaban, seizure activity, CAD s/p stents, CABG (2020), HTN, HLD, DM on insulin, gastric/duodenal ulcer, recent hospitalization 4/28/22 -5/10/22 with weakness/anemia found to have perinephric hematoma requiring evacuation and repair of bleeding arterial anastomosis presenting with status epilepticus requiring intubation for hypoxic respiratory  failure. Hospital course complicated with Pleural effusion requiring thoracentesis, hemothorax and hemorrhagic shock following right thoracentesis done on 6/10 for effusion s/p chest tube, followed by VATS, ESBL UTI tx with Ertapenem, and fluctuating Cr with increase in liver enzymes for which Transplant Hepatology was consulted.     #Hepatocellular liver injury:  -Patient with normal liver enzymes with AST uptrending to 625 and ALT to 596. Although Alk phos up to 396, Bili normal. Suspect DILI (possibly culprit includes Hydralazine, Atorvastatin, Bactrim and Phenytoin) vs Reactivation of Hep B virus. CMV PCR negative.  Liver enzymes improving today.     Recommendations  - f/up Hep B PCR  - Agree to hold Atorvastatin, Hydralazine, and Bactrim.   - Obtain HSV PCR  - trend CMP and INR daily    Will continue to follow     Recommendations preliminary until signed by attending.     Yovanny Arriaga MD  Gastroenterology/Hepatology Fellow  1st option: [phone] (text or call), ONLY available from 7:00 am to 5:00 pm.   **Contact on-call GI fellow via answering service ([phone]) from 5pm-7am AND on weekends/holidays**  2nd option: Available via [employer] Teams  3rd option: Pager: [phone]

## 2022-07-26 NOTE — PROGRESS NOTE ADULT - SUBJECTIVE AND OBJECTIVE BOX
[facility] DIVISION OF KIDNEY DISEASES AND HYPERTENSION   FOLLOW UP NOTE    --------------------------------------------------------------------------------  HPI: 66 yr old man with h/o ESRD due to DM on HD since 2019, s/p DDRT from DCD donor on 4/21/22 complicated by DGF requiring HD until 4/29/22.   PMH: DM type II, HTN, CAD s/p CABG in 2020, Afib on Eliquis, CVA in 2019 due to Afib, Seizure d/o following CVA, h/o GI bleed in 2/2022 EGD with duodenal ulcer.  Donor was 58 , KDPI 82%, DCD, single vessels and ureter, HLA mismatch 1, 2, 2. No DSA, cPRA 0%. CMV +/+    Hospitalized May 2022 for anemia in the setting of large perinephric hematoma s/p evacuation and repair of arterial anastomosis on 5/2/22. Intra operative biopsy showed no rejection. H/o seizure d/o with last seizure episode prior to transplant was on 4/8/22. He is readmitted for status epilepticus in the setting of sub therapeutic valproic acid levels. Course complicated by respiratory failure, hemothorax and hemorrhagic shock following right thoracentesis done on 6/10 for effusion. S/p PRBC x 5 units and vasopressors, chest tube, followed by VATS.    Envarsus discontinued and started on belatacept due to concern for neurotoxicity. Allograft function is stable, mental status is slowly improving. He is on fosphenytoin with good control of seizures. Renal sonogram done on 7/19 showed no hydronephrosis. MARA was not detected on US. DSA sent on 7/19/22 were negative.    Pt was noted to have worsening renal function with SCr peaked to 2.13. Other labs significant for elevated LFTs and SNa of 128. Pt also developed fevers and has persistent luecopenia. Pt transferred to SICU on 7/25/22 for closer monitoring. CT /p negative for any collections/abscess. No hydronephrosis noted on renal US. UA done on 7/25/22 showed LE and pyuria.     Pt. was seen and examined today, weak appearing. Scr elevated at 2.67 today . Pt nonoliguric with UOP of 440cc as documented. Pt continues to have fevers.     PAST HISTORY  --------------------------------------------------------------------------------  No significant changes to PMH, PSH, FHx, SHx, unless otherwise noted    ALLERGIES & MEDICATIONS  --------------------------------------------------------------------------------  Allergies    No Known Allergies    Intolerances      Standing Inpatient Medications  carvedilol 25 milliGRAM(s) Oral every 12 hours  chlorhexidine 2% Cloths 1 Application(s) Topical <User Schedule>  doxazosin 4 milliGRAM(s) Oral <User Schedule>  enoxaparin Injectable 60 milliGRAM(s) SubCutaneous <User Schedule>  epoetin cortney-epbx (RETACRIT) Injectable 70523 Unit(s) SubCutaneous every 7 days  finasteride 5 milliGRAM(s) Oral daily  fluconAZOLE IVPB 400 milliGRAM(s) IV Intermittent every 24 hours  insulin glargine Injectable (LANTUS) 5 Unit(s) SubCutaneous at bedtime  insulin lispro (ADMELOG) corrective regimen sliding scale   SubCutaneous three times a day with meals  insulin lispro (ADMELOG) corrective regimen sliding scale   SubCutaneous at bedtime  insulin lispro Injectable (ADMELOG) 4 Unit(s) SubCutaneous three times a day before meals  levothyroxine 50 MICROGram(s) Oral daily  melatonin 6 milliGRAM(s) Oral at bedtime  meropenem  IVPB      meropenem  IVPB 1000 milliGRAM(s) IV Intermittent every 12 hours  NIFEdipine XL 60 milliGRAM(s) Oral two times a day  pantoprazole    Tablet 40 milliGRAM(s) Oral daily  phenytoin   Capsule 100 milliGRAM(s) Oral every 8 hours  polyethylene glycol 3350 17 Gram(s) Oral daily  predniSONE   Tablet 5 milliGRAM(s) Oral daily  senna 2 Tablet(s) Oral at bedtime  sodium bicarbonate 1300 milliGRAM(s) Oral <User Schedule>    PRN Inpatient Medications      REVIEW OF SYSTEMS  --------------------------------------------------------------------------------  Gen: No fevers/chills  Head/Eyes/Ears: No HA   Respiratory: No dyspnea, cough  CV: No chest pain  GI: No abdominal pain, diarrhea  : No dysuria, hematuria  MSK: No  edema  Skin: No rashes  Heme: No easy bruising or bleeding    All other systems were reviewed and are negative, except as noted.    VITALS/PHYSICAL EXAM  --------------------------------------------------------------------------------  T(C): 38.3 (07-26-22 @ 07:00), Max: 38.8 (07-26-22 @ 03:00)  HR: 58 (07-26-22 @ 10:00) (55 - 70)  BP: 112/53 (07-26-22 @ 10:00) (112/53 - 172/74)  RR: 24 (07-26-22 @ 10:00) (17 - 33)  SpO2: 98% (07-26-22 @ 10:00) (95% - 99%)  Wt(kg): --        07-25-22 @ 07:01  -  07-26-22 @ 07:00  --------------------------------------------------------  IN: 890 mL / OUT: 440 mL / NET: 450 mL    07-26-22 @ 07:01  -  07-26-22 @ 10:46  --------------------------------------------------------  IN: 400 mL / OUT: 0 mL / NET: 400 mL        Physical Exam:  	Gen: NAD  	HEENT: Anicteric  	Pulm: CTA B/L  	CV: S1S2+  	Abd: Soft, +BS           Transplant site: RLQ non tender, well healed surgical scar+  	Ext: No LE edema B/L  	Neuro: Awake          :Miranda cath+ with clear urine  	Skin: Warm and dry  	Dialysis access:      LABS/STUDIES  --------------------------------------------------------------------------------              8.5    3.72  >-----------<  392      [07-25-22 @ 22:48]              25.7     130  |  98  |  96  ----------------------------<  123      [07-25-22 @ 22:48]  4.9   |  16  |  2.67        Ca     8.9     [07-25-22 @ 22:48]      Mg     2.3     [07-25-22 @ 22:48]      Phos  4.9     [07-25-22 @ 22:48]    Creatinine Trend:  SCr 2.67 [07-25 @ 22:48]  SCr 2.45 [07-25 @ 18:28]  SCr 2.22 [07-25 @ 11:51]  SCr 2.13 [07-25 @ 06:30]  SCr 1.79 [07-24 @ 18:13]    Urinalysis - [07-25-22 @ 18:28]      Color Yellow / Appearance Slightly Turbid / SG 1.020 / pH 5.5      Gluc Negative / Ketone Negative  / Bili Negative / Urobili Negative       Blood Small / Protein 100 / Leuk Est Large / Nitrite Negative      RBC 2 / WBC 40 / Hyaline 5 / Gran  / Sq Epi  / Non Sq Epi 0 / Bacteria Many    Urine Creatinine 129      [07-26-22 @ 04:17]  Urine Sodium 20      [07-26-22 @ 04:17]  Urine Osmolality 387      [07-26-22 @ 04:17]    HBsAg Nonreact      [07-25-22 @ 11:51]  HCV 0.18, Nonreact      [07-25-22 @ 11:51]    Syphilis Screen (Treponema Pallidum Ab) Negative      [06-27-22 @ 02:00]    Tacrolimus  Cyclosporine  Sirolimus  Mycophenolate  BK PCR  CMV PCRCMVPCR Log: NotDetec Sxd61WF/mL (07-22 @ 06:18)  CMVPCR Log: NotDetec Nuj97VG/mL (06-26 @ 18:14)  CMVPCR Log: NotDetec Wsh45JL/mL (06-25 @ 14:08)    Parvo PCRParvovirus B19 DNA by PCR: NotDetec IU/mL (06-26 @ 18:14)    EBV PCR

## 2022-07-26 NOTE — PROGRESS NOTE ADULT - TIME BILLING
Kidney Transplant recipient with functioning allograft  ANA, up trending creatinine  Fever under evaluation  Abnormal LFT improving , Anemia s/p PRBC, stable Hb/Hct  Creatinine trend noted  Comorbidities reviewed.  Patient seen, examined and reviewed available clinical and lab data including history,  progress notes and consult notes.  Reviewed immunosuppression and allograft function including urine out put, creatinine trend, urine studies and any allograft and bladder imaging.  Noted Belatacept based regimen.  Reviewed medication regimen for comorbidities  Suggestions:  1. f/u Blood, urine culture  2. Abdominal imaging- Non contrast CT,   sonogram noted  3. Broad spectrum antimicrobial coverage, Transplant ID consult appreciated  4. Adjust dose of Lovenox, for e GFR  5. On hold potential hepatotoxic medications   Will follow  I was present during and reviewed clinical and lab data as well as assessment and plan as documented by the house staff as noted. Please contact if any additional questions with any change in clinical condition or on availability of any additional information or reports. Kidney Transplant recipient with functioning allograft  ANA, up trending creatinine  Fever under evaluation  Abnormal LFT improving , Anemia s/p PRBC, stable Hb/Hct  Creatinine trend noted  Comorbidities reviewed.  Patient seen, examined and reviewed available clinical and lab data including history,  progress notes and consult notes.  Reviewed immunosuppression and allograft function including urine out put, creatinine trend, urine studies and any allograft and bladder imaging.  Noted Belatacept based regimen.  Reviewed medication regimen for comorbidities  Suggestions:  1. f/u Blood, urine culture  2. Abdominal imaging- Non contrast CT,   sonogram noted  3. Broad spectrum antimicrobial coverage, Transplant ID consult appreciated  4. Adjust dose of Lovenox, for e GFR  5. On hold potential hepatotoxic medications   Ensure intake of oral medications  Will follow  I was present during and reviewed clinical and lab data as well as assessment and plan as documented by the house staff as noted. Please contact if any additional questions with any change in clinical condition or on availability of any additional information or reports.

## 2022-07-26 NOTE — PROGRESS NOTE ADULT - ASSESSMENT
66M with h/o DM type II, HTN, CAD s/p CABG in 2020, Afib on Eliquis, CVA in 2019 due to Afib, Seizure d/o (last episode was on 4/8/22), h/o GI bleed in 2/2022 EGD with duodenal ulcer and ESRD on HD s/p R DDRT from DCD donor on 4/21/22 complicated by DGF requiring HD until 4/29/22 now with good graft function who presented with status epilepticus in setting of UTI/antibiotics and sub-therapeutic valproic acid level       Acute Transaminitis   - Hepatology consulted   - RUQ abd- Abdominal doppler - no PVT   - CT a/p non contrast- waiting final read  - Labs: Hepatitis panel, BK PCR, EBV PCR, CMV with PCR   - Infectious w/u: bld/urine cxs- sent f/u results    - c/w  meropenem  - start treatment dose Fluc 400 IV QD  - send herpes serologies and lipase  - poor po intake- place NGT/ Feeding tube start TF  - start IVF   - ECHO   - ID following      R DCD DDRT 4/21/22:  - S/P removal of ureteral stent on 7/12  - Uptrending SCr, Repeat Renal doppler done 7/25 - results noted   - DM diet as tolerated with aspiration precautions- place FT   - Continue Doxazosin/Proscar for BPH  - OOB with RN/PT  - Anemia: s/p 1u PRBC yesterday, continue Retacrit   - Leukopenia: held valcyte, CMV negative. Held MMF     Immunosuppression:   - Belatacept: 6/23, 7/4, 4/18 (initial loading). Re-loaded 7/8 as there was a gap between doses 2/2 seizures.  - MMF held  - continue Pred 5  - PPx: Valcyte HELD/PPI/nystatin (thrush)/Bactrim    Seizure Disorder:  - MRI head 6/27 with less concerns for PRES, likely ischemic changes  - Remains seizure free  - Antiepileptic regimen per Neurology, on Phenytoin 100mg TID, no further adjustment required  - Keep Mag > 2    DM  - on Lantus/Lispro, Endocrine following     AFib  RIJ DVT   - Eliquis with ~40% less efficacy while on fosphenytoin.    - on  Lovenox 60mg daily  (from bid) in setting of transaminitis   - rate controlled        HTN:  - Nifedipine/Cardura/Coreg.   - dced Hydralazine (in setting of transaminitis     Dispo:   In  SICU for close monitoring  High risk for decompensation  Discussed with pt's son.

## 2022-07-26 NOTE — PROGRESS NOTE ADULT - SUBJECTIVE AND OBJECTIVE BOX
DIABETES FOLLOW UP NOTE: Saw pt earlier today    Chief Complaint: Endocrine consult requested for management of T2DM    INTERVAL HX: Pt stable, back in SICU due to sepsis and transaminitis. Feels weak but eating slightly better. BG mostly at goal without hypoglycemia. On and off NPO for tests.       Review of Systems:  General: As above  Cardiovascular: No chest pain, palpitations  Respiratory: No SOB, no cough  GI: No nausea, vomiting, abdominal pain  Endocrine: No polyuria, polydipsia or S&Sx of hypoglycemia    Allergies    No Known Allergies    Intolerances      MEDICATIONS:  fluconAZOLE IVPB 400 milliGRAM(s) IV Intermittent every 24 hours  insulin glargine Injectable (LANTUS) 5 Unit(s) SubCutaneous at bedtime  insulin lispro (ADMELOG) corrective regimen sliding scale   SubCutaneous three times a day with meals  insulin lispro (ADMELOG) corrective regimen sliding scale   SubCutaneous at bedtime  insulin lispro Injectable (ADMELOG) 4 Unit(s) SubCutaneous three times a day before meals  levothyroxine 50 MICROGram(s) Oral daily      meropenem  IVPB 1000 milliGRAM(s) IV Intermittent every 12 hours  predniSONE   Tablet 5 milliGRAM(s) Oral daily        PHYSICAL EXAM:  VITALS: T(C): 36.9 (07-26-22 @ 15:00)  T(F): 98.4 (07-26-22 @ 15:00), Max: 101.8 (07-26-22 @ 03:00)  HR: 57 (07-26-22 @ 18:00) (54 - 70)  BP: 129/58 (07-26-22 @ 18:00) (106/52 - 172/74)  RR:  (18 - 33)  SpO2:  (95% - 100%)  Wt(kg): --  GENERAL: Male sitting in chair in NAD. Son at bedside   Abdomen: Soft, nontender, non distended  Extremities: Warm, no edema in all 4 exts  NEURO: Alert and able to answer simple questions    LABS:  POCT Blood Glucose.: 228 mg/dL (07-26-22 @ 17:08)  POCT Blood Glucose.: 197 mg/dL (07-26-22 @ 12:13)  POCT Blood Glucose.: 139 mg/dL (07-26-22 @ 08:22)  POCT Blood Glucose.: 121 mg/dL (07-25-22 @ 22:31)  POCT Blood Glucose.: 173 mg/dL (07-25-22 @ 18:09)  POCT Blood Glucose.: 189 mg/dL (07-25-22 @ 11:32)  POCT Blood Glucose.: 155 mg/dL (07-25-22 @ 08:38)  POCT Blood Glucose.: 114 mg/dL (07-24-22 @ 21:11)  POCT Blood Glucose.: 188 mg/dL (07-24-22 @ 17:33)  POCT Blood Glucose.: 207 mg/dL (07-24-22 @ 12:51)  POCT Blood Glucose.: 125 mg/dL (07-24-22 @ 08:46)  POCT Blood Glucose.: 180 mg/dL (07-23-22 @ 21:09)                            8.3    3.49  )-----------( 356      ( 26 Jul 2022 14:45 )             24.9       07-26    129<L>  |  97  |  96<H>  ----------------------------<  206<H>  5.2   |  15<L>  |  3.17<H>    eGFR: 21<L>    Ca    8.3<L>      07-26  Mg     2.2     07-26  Phos  5.5     07-26    TPro  6.2  /  Alb  2.5<L>  /  TBili  0.4  /  DBili  x   /  AST  167<H>  /  ALT  364<H>  /  AlkPhos  287<H>  07-26      Thyroid Function Tests:  07-06 @ 18:36 TSH 4.69 FreeT4 -- T3 43 Anti TPO -- Anti Thyroglobulin Ab -- TSI --      A1C with Estimated Average Glucose Result: 6.0 % (06-30-22 @ 05:49)      Estimated Average Glucose: 126 mg/dL (06-30-22 @ 05:49)

## 2022-07-26 NOTE — PROGRESS NOTE ADULT - ATTENDING COMMENTS
65 yo M with HTN, HLD, IDDM, PUD, CAD (with prior stents and CABG), AF (on AC), hx CVA (2019), seizure disorder, and ESRD s/p DDRT (4/21/22), with complicated post-transplant course including DGF requiring HD until 4/29/22, perinephric hematoma (s/p evacuation and repair of bleeding arterial anastomosis 5/2/22), status epilepticus (6/10/22 and 6/25/22), acute hypoxic respiratory failure due to hemothorax (s/p VATS 6/13/22), and ESBL Klebsiella UTI (7/15/22), also with concern for possible PRES (off Envarsus and switched to belatacept 6/23/22), now transferred back to SICU on 7/25/22 due to SIRS with fevers and leukopenia as well as worsening renal function and an acute liver injury with peak , , and  (all on 7/25/22) that is now improving. He previously had normal liver enzymes as recently as 7/22. The pattern and improvement in his liver chemistries is most suggestive of an idiosyncratic drug-induced liver injury (DILI), though could also hemodynamically mediated given coincident sepsis. Continue to hold atorvastatin (prior home medication, last received on 7/24), Bactrim (prior home medication since transplant, last received on 7/24), and hydralazine (last received on 7/25). Lower suspicion for phenytoin hepatotoxicity given absence of hypersensitivity features typically seen in associated with toxicity from that medication as well as improving liver chemistries despite continuation of this medication. Likewise, given improving liver chemistries, there is lower suspicion for an infectious hepatitis. HAV immune. HSV 1/2 PCRs negative. Additional PCRs (HBV given potential risk of reactivation, EBV, Parvovirus B19, and CMV) sent and pending.    The above recommendations were discussed with transplant surgeon Dr. Daniel Tena. We will continue to follow. Please don't hesitate to call with any questions or concerns.    Xandre Vo M.D., Ph.D.  Transplant Hepatology

## 2022-07-26 NOTE — PROGRESS NOTE ADULT - SUBJECTIVE AND OBJECTIVE BOX
INCOMPLETE NOTE Follow Up:      Interval History/ROS:Patient is a 67 yo Male who presents with a chief complaint of Status Epilepticus (2022 14:45)    Febrile overnight. Transaminitis now improving. Pt seen and examined at bedside. No acute complaints.     REVIEW OF SYSTEMS  Constitutional: No fevers  Skin: No rash  Eyes: No discharge	  ENMT: No sore throat  Respiratory: No cough  Cardiovascular:  No chest pain  Gastrointestinal: No pain, nausea, vomiting	  Genitourinary: No dysuria  Neurological: No HA    Allergies  No Known Allergies    ANTIMICROBIALS:    fluconAZOLE IVPB 400 every 24 hours  meropenem  IVPB    meropenem  IVPB 1000 every 12 hours    OTHER MEDS: MEDICATIONS  (STANDING):  carvedilol 25 every 12 hours  doxazosin 4 <User Schedule>  enoxaparin Injectable 60 <User Schedule>  epoetin cortney-epbx (RETACRIT) Injectable 61828 every 7 days  finasteride 5 daily  insulin glargine Injectable (LANTUS) 5 at bedtime  insulin lispro (ADMELOG) corrective regimen sliding scale  three times a day with meals  insulin lispro (ADMELOG) corrective regimen sliding scale  at bedtime  insulin lispro Injectable (ADMELOG) 4 three times a day before meals  levothyroxine 50 daily  melatonin 6 at bedtime  NIFEdipine XL 60 daily  pantoprazole    Tablet 40 daily  phenytoin   Capsule 100 every 8 hours  polyethylene glycol 3350 17 daily  predniSONE   Tablet 5 daily  senna 2 at bedtime    Vital Signs Last 24 Hrs  T(F): 99.3 (22 @ 11:00), Max: 101.8 (22 @ 03:00)    Vital Signs Last 24 Hrs  HR: 56 (22 @ 13:00) (54 - 70)  BP: 108/60 (22 @ 13:00) (106/52 - 172/74)  RR: 25 (22 @ 13:00)  SpO2: 97% (22 @ 13:00) (95% - 99%)  Wt(kg): --    EXAM:  General: Alert and Awake, NAD  Cardiac: RRR, No M/R/G  Resp: CTAB, No Wh/Rh/Ra  Abdomen: NBS, NT/ND, No HSM, No rigidity or guarding  MSK: No LE edema. No Calf tenderness  : Stage 2 decubitus ulcer (No surrounding erythema, drainage or tenderness to palpation)  Skin: No rashes or lesions. Skin is warm and dry to the touch.   Neuro: Alert and Awake. CN 2-12 Grossly intact. Moves all four extremities spontaneously.  Psych: Calm, Pleasant, Cooperative    Labs:                        8.3    3.49  )-----------( 356      ( 2022 14:45 )             24.9         129<L>  |  97  |  96<H>  ----------------------------<  206<H>  5.2   |  15<L>  |  3.17<H>    Ca    8.3<L>      2022 14:45  Phos  5.5       Mg     2.2         TPro  6.2  /  Alb  2.5<L>  /  TBili  0.4  /  DBili  x   /  AST  167<H>  /  ALT  364<H>  /  AlkPhos  287<H>      WBC Trend:  WBC Count: 3.49 (22 @ 14:45)  WBC Count: 3.72 (22 @ 22:48)  WBC Count: 4.52 (22 @ 18:28)  WBC Count: 2.94 (22 @ 06:29)    Creatine Trend:  Creatinine, Serum: 3.17 ()  Creatinine, Serum: 2.67 ()  Creatinine, Serum: 2.45 ()  Creatinine, Serum: 2.22 ()    Liver Biochemical Testing Trend:  Alanine Aminotransferase (ALT/SGPT): 364 *H* ()  Alanine Aminotransferase (ALT/SGPT): 520 *H* ()  Alanine Aminotransferase (ALT/SGPT): 562 *H* ()  Alanine Aminotransferase (ALT/SGPT): 580 *H* ()  Alanine Aminotransferase (ALT/SGPT): 596 *H* ()  Aspartate Aminotransferase (AST/SGOT): 167 (22 @ 14:45)  Aspartate Aminotransferase (AST/SGOT): 358 (22 @ 22:48)  Aspartate Aminotransferase (AST/SGOT): 457 (22 @ 18:28)  Aspartate Aminotransferase (AST/SGOT): 555 (22 @ 11:51)  Aspartate Aminotransferase (AST/SGOT): 685 (22 @ 06:30)  Bilirubin Total, Serum: 0.4 (07-26)  Bilirubin Total, Serum: 0.7 (07-25)  Bilirubin Total, Serum: 0.7 (07-25)  Bilirubin Total, Serum: 0.3 (07-25)  Bilirubin Total, Serum: 0.3 (07-25)    Trend LDH  22 @ 06:30  655<H>  22 @ 18:47  223  22 @ 10:05  246<H>  22 @ 07:15  260<H>  22 @ 18:58  179    Urinalysis Basic - ( 2022 18:28 )  Color: Yellow / Appearance: Slightly Turbid / S.020 / pH: x  Gluc: x / Ketone: Negative  / Bili: Negative / Urobili: Negative   Blood: x / Protein: 100 / Nitrite: Negative   Leuk Esterase: Large / RBC: 2 /hpf / WBC 40 /HPF   Sq Epi: x / Non Sq Epi: 0 /hpf / Bacteria: Many    MICROBIOLOGY:    MRSA PCR Result.: NotDetec (22 @ 21:02)  MRSA PCR Result.: NotDetec (22 @ 11:25)    Culture - Blood (collected 2022 11:59)  Source: .Blood Blood  Preliminary Report:    No growth to date.    Culture - Blood (collected 15 Jul 2022 08:36)  Source: .Blood Blood  Final Report:    No Growth Final    Culture - Blood (collected 15 Jul 2022 07:15)  Source: .Blood Blood  Final Report:    No Growth Final    Culture - Urine (collected 2022 00:58)  Source: Clean Catch Clean Catch (Midstream)  Final Report:    50,000 - 99,000 CFU/mL Klebsiella pneumoniae ESBL  Organism: Klebsiella pneumoniae ESBL  Organism: Klebsiella pneumoniae ESBL    Sensitivities:      -  Amikacin: S <=16      -  Amoxicillin/Clavulanic Acid: S <=8/4 Consider reserving for cystitis when ampicillin/sulbactam is resistant      -  Ampicillin: R >16 These ampicillin results predict results for amoxicillin      -  Ampicillin/Sulbactam: R >16/8 Enterobacter, Klebsiella aerogenes, Citrobacter, and Serratia may develop resistance during prolonged therapy (3-4 days)      -  Aztreonam: R >16      -  Cefazolin: R >16 (MIC_CL_[id number]_[id number]) For uncomplicated UTI with K. pneumoniae, E. coli, or P. mirablis: TALITA <=16 is sensitive and TALITA >=32 is resistant. This also predicts results for oral agents cefaclor, cefdinir, cefpodoxime, cefprozil, cefuroxime axetil, cephalexin and locarbef for uncomplicated UTI. Note that some isolates may be susceptible to these agents while testing resistant to cefazolin.      -  Cefepime: R >16      -  Ceftriaxone: R >32 Enterobacter, Klebsiella aerogenes, Citrobacter, and Serratia may develop resistance during prolonged therapy      -  Ciprofloxacin: R 1      -  Ertapenem: S <=0.5      -  Gentamicin: R >8      -  Imipenem: S <=1      -  Levofloxacin: S <=0.5      -  Meropenem: S <=1      -  Nitrofurantoin: R >64 Should not be used to treat pyelonephritis      -  Piperacillin/Tazobactam: S <=8      -  Tigecycline: S <=2      -  Tobramycin: I 8      -  Trimethoprim/Sulfamethoxazole: R >2/38      Method Type: TALITA    Culture - Fungal, CSF (collected 2022 13:09)  Source: .CSF CSF  Preliminary Report:    No growth    Culture - CSF with Gram Stain (collected 2022 13:09)  Source: .CSF CSF  Final Report:    No growth    Culture - Acid Fast - CSF (collected 2022 13:09)  Source: .CSF CSF  Preliminary Report:    No growth at 1 week.    Culture - Nocardia (collected 2022 05:06)  Source: .Other Other  Final Report:    No Nocardia isolated    Culture - Blood (collected 2022 23:27)  Source: .Blood Blood-Peripheral  Final Report:    No Growth Final    Culture - Blood (collected 2022 23:27)  Source: .Blood Blood-Peripheral  Final Report:    No Growth Final    HIV-1/2 Combo Result: Nonreact (22 @ 14:47)  HIV-1/2 Combo Result: Nonreact (20 @ 19:26)    Cytomegalovirus By PCR:   NotDetec (22 @ 06:18)    Rapid RVP Result: NotDetec ( @ 15:15)    COVID-19 PCR: NotDetec (22 @ 11:51)  COVID-19 PCR: NotDetec (22 @ 07:55)  COVID-19 PCR: NotDetec (22 @ 07:54)  COVID-19 PCR: NotDetec (07-10-22 @ 16:11)  COVID-19 PCR: NotDetec (22 @ 06:03)  COVID-19 PCR: NotDetec (22 @ 07:01)  COVID-19 PCR: NotDetec (22 @ 12:44)    Procalcitonin, Serum: 0.88 ()    Lactate Dehydrogenase, Serum: 655 ()    Blood Gas Arterial, Lactate: 1.5 ( @ 19:00)  Blood Gas Venous - Lactate: 1.4 ( @ 18:07)    RADIOLOGY:  imaging below personally reviewed    < from: CT Abdomen and Pelvis No Cont (22 @ 17:16) >    IMPRESSION:  Limited study to evaluate for infectionwithout intravenous contrast.    Small perinephric fluid collection. No hydronephrosis.    Small volume ascites.    < end of copied text >

## 2022-07-26 NOTE — PROGRESS NOTE ADULT - ASSESSMENT
66 year old Male with PMHx ESRD s/p permacath removal 5/10/22 s/p Rt DDRT 4/21/22,  CVA (2019), Afib on apixaban, seizure activity, CAD s/p stents, CABG (2020), HTN, HLD, DM on insulin, gastric/duodenal ulcer, recent hospitalization 4/28/22 -5/10/22 with weakness/anemia found to have perinephric hematoma requiring evacuation and repair of bleeding arterial anastomosis who presented to [facility] for status epilepticus requiring intubation for hypoxic respiratory failure.     Subsequently developed continued seizures and hypothermia -> resolved   Was initiated on empiric antibiotics  s/p LP which was not suggestive of infectious process  Blood, urine and sputum cultures without positive sample    U/A (7/12) 161 WBC  UCx (7/13) 50-99K ESBL Klebsiella   s/p 3 days of Ertapenem    Now with uptrending transaminases that have plateaued/are trending down     Febrile today and overnight   RUQ (7/25) with hepatomegaly   RVP (7/25) Negative  CMV (7/22) Negative    UA (7/25) with 40 WBC, large LE   CT c/a/p (7/25) with only small perinephric fluid collection, small volume ascites.    #Fever, Transaminitis, transaminitis  Renal Transplant Recipient  --S/p Vancomycin 1g x1 07/25   --Meropenem 1g IV Q12H  --Recommend Adenovirus, Parvovirus, HHV6, EBV PCR   --Continue to follow CBC with diff  --Continue to follow renal function (Cr/BUN)  --Continue to follow transaminases  --Continue to follow temperature curve  --Follow up on preliminary blood cultures  --Follow up on preliminary urine culture    All recommendations are preliminary pending Attending Attestation.  66 year old Male with PMHx ESRD s/p permacath removal 5/10/22 s/p Rt DDRT 4/21/22,  CVA (2019), Afib on apixaban, seizure activity, CAD s/p stents, CABG (2020), HTN, HLD, DM on insulin, gastric/duodenal ulcer, recent hospitalization 4/28/22 -5/10/22 with weakness/anemia found to have perinephric hematoma requiring evacuation and repair of bleeding arterial anastomosis who presented to [facility] for status epilepticus requiring intubation for hypoxic respiratory failure.     Subsequently developed continued seizures and hypothermia -> resolved   Was initiated on empiric antibiotics  s/p LP which was not suggestive of infectious process  Blood, urine and sputum cultures without positive sample    U/A (7/12) 161 WBC  UCx (7/13) 50-99K ESBL Klebsiella   s/p 3 days of Ertapenem    Now with uptrending transaminases that have plateaued/are trending down     Febrile today and overnight   RUQ (7/25) with hepatomegaly   RVP (7/25) Negative  CMV (7/22) Negative    UA (7/25) with 40 WBC, large LE   CT c/a/p (7/25) with only small perinephric fluid collection, small volume ascites.    #Fever, Transaminitis, Renal Transplant Recipient  --S/p Vancomycin 1g x1 07/25 -> no further Vancomycin given CT chest negative for pneumonia/consolidation  --Meropenem 1g IV Q12H  --Recommend Adenovirus, Parvovirus, HHV6, EBV PCR as transaminitis + fevers lends suspicion to viral infection.   --Doubt need Fluconazole at this point    --Continue to follow CBC with diff  --Continue to follow renal function (Cr/BUN)  --Continue to follow transaminases  --Continue to follow temperature curve  --Follow up on preliminary blood cultures  --Follow up on preliminary urine culture    All recommendations are preliminary pending Attending Attestation.  66 year old Male with PMHx ESRD s/p permacath removal 5/10/22 s/p Rt DDRT 4/21/22,  CVA (2019), Afib on apixaban, seizure activity, CAD s/p stents, CABG (2020), HTN, HLD, DM on insulin, gastric/duodenal ulcer, recent hospitalization 4/28/22 -5/10/22 with weakness/anemia found to have perinephric hematoma requiring evacuation and repair of bleeding arterial anastomosis who presented to [facility] for status epilepticus requiring intubation for hypoxic respiratory failure.     Subsequently developed continued seizures and hypothermia -> resolved   Was initiated on empiric antibiotics  s/p LP which was not suggestive of infectious process  Blood, urine and sputum cultures without positive sample    U/A (7/12) 161 WBC  UCx (7/13) 50-99K ESBL Klebsiella   s/p 3 days of Ertapenem    Now with uptrending transaminases that have plateaued/are trending down     Febrile today and overnight   RUQ (7/25) with hepatomegaly   RVP (7/25) Negative  CMV (7/22) Negative    UA (7/25) with 40 WBC, large LE   CT c/a/p (7/25) with only small perinephric fluid collection, small volume ascites.    #Fever, Transaminitis, Renal Transplant Recipient  --S/p Vancomycin 1g x1 07/25 -> no further Vancomycin  --Meropenem 1g IV Q12H  --Follow up on Adenovirus, Parvovirus, HHV6, EBV PCR as transaminitis + fevers lends suspicion to viral infection.   --Doubt need Fluconazole at this point  --Continue to follow CBC with diff  --Continue to follow renal function (Cr/BUN)  --Continue to follow transaminases  --Continue to follow temperature curve  --Follow up on preliminary blood cultures  --Follow up on preliminary urine culture

## 2022-07-26 NOTE — PROGRESS NOTE ADULT - ASSESSMENT
PLAN:  Neuro: 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 c/b status epilepticus, delirium  - Pain control if needed w/ acetaminophen  - Seizures controlled with current regimen of phenytoin 100mg q8  - Melatonin qhs for insomnia, protected sleep time    Resp: intubated for airway protection x3, now extubated, large right pleural effusion s/p thoracentesis c/b hemothorax while being anticoagulated s/p chest tube placement c/b retained hemothorax s/p right VATS  - Out of bed to chair, incentive spirometry, and aggressive chest PT to prevent atelectasis  - Maintain SpO2 >92%   - Repeat RVP with COVID swab  - CT chest to evaluate for pneumonia as source of sepsis  - CT chest negative    CV: CAD, AF, HTN  - Carvedilol 25mg q 12, nifedipine 60mg BID for HTN    GI: transaminitis  - Possibility this could be attributed to phenytoin, neurology will continue to monitor  - Lipitor and Bactrim discontinued.   - Check RUQ US - negative for portal vein thrombus  - Hepatology consulted.   - Was tolerating regular diet   - NPO for now pending CT A/P to evaluate for source of sepsis  - Continue Miralax  - Protonix for stress ulcer prophylaxis    Renal:  s/p DDRT; ANA, hyponatremia  - Renal US - no evidence of renal artery stenosis  - No Miranda, voiding spontaneously  - Continue Doxazosin and Finasteride   - PO Bicarb 1300mg BID for metabolic acidosis 2/2 ANA    Heme: anemia of chronic diseases, AC for A.fib  - was on Eliquis, now on therapeutic Lovenox due to drug interaction with fosphenytoin. Lovenox dose now adjusted to daily dosing due to decreased egfr.  - Monitor CBC and coags  - Epogen for anemia of chronic diseases  - 1u PRBC transfusion for anemia and h/o CAD    ID: recent ESBL Klebsiella UTI s/p Ertapenem x3 days, immunosuppression  - Monitor WBC, temperature, and procalcitonin  - Send UA, Urine culture, Blood cultures x 2, procalcitonin  - CT CAP non-con to evaluate for source of infection  - Start meropenem, give 1 dose vancomycin  - Infection prophylaxis - Bactrim discontinued due to elevated LFTs. Continue holding Valcyte 450mg daily (CMV intermediate risk) for now given leukopenia, CMV PCR negative. Continue nystatin.  - Immunosuppression - continue prednisone 5 mg qd     Endo: DM type II, hypothyroidism  - Monitor glucose w/ ISS q6hrs for glycemic control; HgbA1C 6.6% on 4/24  - Lantus 5 u qhs, 4 units premeal admelog, ISS  - Synthroid for hypothyroidism      DISPO: SICU. Discussed with transplant team & Dr. Kaufman

## 2022-07-26 NOTE — PROGRESS NOTE ADULT - SUBJECTIVE AND OBJECTIVE BOX
SUBJECTIVE/INTERVAL HISTORY: Pt seen and examined at bedside. Pt's son also at bedside. Pt reports no issues, feeling well. Per son, pt has been able to ambulate a little for the first time in 7 weeks.     PAST MEDICAL & SURGICAL HISTORY:  Hypertension      Diabetes      Dyslipidemia      CAD (Coronary Artery Disease)  with Stents in 2009 and 2019, s/p off-pump C3L on 20      Hypothyroidism      CVA (cerebral vascular accident)  19 with residual bilateral weakness      Anemia      PEG (percutaneous endoscopic gastrostomy) status  removed 2020      Intubation of airway performed without difficulty  Dec 2019  CVA c/b status epilepticus requiring intubation and PEG in 2019-      ESRD on dialysis  M/W/F      Seizures  after CVA, last seizure was last week 22      History of insertion of stent into coronary artery bypass graft   and       S/P CABG x 3  off pump C3L on 20        FAMILY HISTORY:  Family history of cancer of tongue (Father)  Parents are  . Father - at 80 years, tongue cancer was a smoker. Mother- at 71, had accident while crossing road. Siblings- 2 brothers and one sister.        MEDICATIONS (HOME):  Home Medications:  amLODIPine 10 mg oral tablet: 1 tab(s) enteral once a day (2022 15:45)  apixaban 2.5 mg oral tablet: 1 tab(s) orally 2 times a day (2022 15:45)  atorvastatin 40 mg oral tablet: 1 tab(s) orally once a day (at bedtime) (2022 15:45)  CellCept 500 mg oral tablet: 1 tab(s) orally 2 times a day (2022 15:45)  Coreg 6.25 mg oral tablet: 1 tab(s) orally 2 times a day (2022 15:45)  divalproex sodium 250 mg oral delayed release tablet: 1 tab(s) orally once a day (2022 15:45)  divalproex sodium 500 mg oral delayed release tablet: 1 tab(s) orally 2 times a day (2022 15:45)  Envarsus XR 1 mg oral tablet, extended release: 3 tab(s) by gastrostomy tube once a day (in the morning) (2022 15:45)  fluconazole 200 mg oral tablet: 1 tab(s) orally once a day (2022 15:45)  HumaLOG 100 units/mL injectable solution: 5 unit(s) injectable 3 times a day (2022 15:45)  insulin glargine 100 units/mL subcutaneous solution: 6 unit(s) subcutaneous once a day (at bedtime) (2022 14:27)  levETIRAcetam 250 mg oral tablet: 1 tab(s) orally 2 times a day (2022 15:45)  levothyroxine 50 mcg (0.05 mg) oral tablet: 1 tab(s) orally once a day (2022 15:45)  magnesium oxide 400 mg oral tablet: 1 tab(s) orally 3 times a day (with meals) (2022 15:45)  Multiple Vitamins oral capsule: 1 cap(s) orally once a day (2022 15:45)  pantoprazole 40 mg oral delayed release tablet: 1 tab(s) orally once a day (before a meal) (2022 15:45)  predniSONE: 5 milligram(s) orally once a day (2022 15:45)  senna oral tablet: 2 tab(s) orally once a day (at bedtime) (2022 15:45)  sulfamethoxazole-trimethoprim 400 mg-80 mg oral tablet: 1 tab(s) orally once a day (2022 15:45)  valGANciclovir 450 mg oral tablet: 1 tab(s) orally 3 times a week (2022 15:45)    MEDICATIONS  (STANDING):  carvedilol 25 milliGRAM(s) Oral every 12 hours  chlorhexidine 2% Cloths 1 Application(s) Topical <User Schedule>  doxazosin 4 milliGRAM(s) Oral <User Schedule>  enoxaparin Injectable 60 milliGRAM(s) SubCutaneous <User Schedule>  epoetin cortney-epbx (RETACRIT) Injectable 53358 Unit(s) SubCutaneous every 7 days  finasteride 5 milliGRAM(s) Oral daily  fluconAZOLE IVPB 400 milliGRAM(s) IV Intermittent every 24 hours  insulin glargine Injectable (LANTUS) 5 Unit(s) SubCutaneous at bedtime  insulin lispro (ADMELOG) corrective regimen sliding scale   SubCutaneous three times a day with meals  insulin lispro (ADMELOG) corrective regimen sliding scale   SubCutaneous at bedtime  insulin lispro Injectable (ADMELOG) 4 Unit(s) SubCutaneous three times a day before meals  levothyroxine 50 MICROGram(s) Oral daily  melatonin 6 milliGRAM(s) Oral at bedtime  meropenem  IVPB      meropenem  IVPB 1000 milliGRAM(s) IV Intermittent every 12 hours  NIFEdipine XL 60 milliGRAM(s) Oral daily  pantoprazole    Tablet 40 milliGRAM(s) Oral daily  phenytoin   Capsule 100 milliGRAM(s) Oral every 8 hours  polyethylene glycol 3350 17 Gram(s) Oral daily  predniSONE   Tablet 5 milliGRAM(s) Oral daily  senna 2 Tablet(s) Oral at bedtime  sodium bicarbonate 1300 milliGRAM(s) Oral <User Schedule>  sodium chloride 0.9%. 1000 milliLiter(s) (100 mL/Hr) IV Continuous <Continuous>    MEDICATIONS  (PRN):    ALLERGIES/INTOLERANCES:  Allergies  No Known Allergies    Intolerances    VITALS & EXAMINATION:  Vital Signs Last 24 Hrs  T(C): 38.3 (2022 07:00), Max: 38.8 (2022 03:00)  T(F): 100.9 (2022 07:00), Max: 101.8 (2022 03:00)  HR: 58 (2022 10:00) (55 - 70)  BP: 112/53 (2022 10:00) (112/53 - 172/74)  BP(mean): 77 (2022 10:00) (77 - 114)  RR: 24 (2022 10:00) (17 - 33)  SpO2: 98% (2022 10:00) (95% - 99%)    Parameters below as of 2022 09:53  Patient On (Oxygen Delivery Method): room air    General appearance: does not appear to be in pain  Head: normocephalic  Eyes: clear sclera  Extremities: no ezio LE edema  Respiratory: breathing regularly    Focused neurologic exam:  MS - awake, alert, oriented to person, place "[facility]", "2022", Follows simple commands. Attend to stimuli from both sides.   CN - pupils equal round, EOMI, BTT b/l, no apparent facial asymmetry, hearing intact to conversation b/l   Motor - Normal bulk. Inc tone in L side and normal tone in R side. Spontaneous movements of L > R side and at least antigravity.     Sens - LT/temp intact all, as above  Gait and station - PERRY      LABORATORY:  CBC                       8.5    3.72  )-----------( 392      ( 2022 22:48 )             25.7     Chem 07-    130<L>  |  98  |  96<H>  ----------------------------<  123<H>  4.9   |  16<L>  |  2.67<H>    Ca    8.9      2022 22:48  Phos  4.9       Mg     2.3         TPro  6.6  /  Alb  2.9<L>  /  TBili  0.7  /  DBili  x   /  AST  358<H>  /  ALT  520<H>  /  AlkPhos  341<H>      LFTs LIVER FUNCTIONS - ( 2022 22:48 )  Alb: 2.9 g/dL / Pro: 6.6 g/dL / ALK PHOS: 341 U/L / ALT: 520 U/L / AST: 358 U/L / GGT: x           Coagulopathy PT/INR - ( 2022 22:48 )   PT: 17.1 sec;   INR: 1.47 ratio         PTT - ( 2022 22:48 )  PTT:40.0 sec  Lipid Panel   A1c   Cardiac enzymes     U/A Urinalysis Basic - ( 2022 18:28 )    Color: Yellow / Appearance: Slightly Turbid / S.020 / pH: x  Gluc: x / Ketone: Negative  / Bili: Negative / Urobili: Negative   Blood: x / Protein: 100 / Nitrite: Negative   Leuk Esterase: Large / RBC: 2 /hpf / WBC 40 /HPF   Sq Epi: x / Non Sq Epi: 0 /hpf / Bacteria: Many        STUDIES & IMAGING:  Studies (EKG, EEG, EMG, etc):     Radiology (XR, CT, MR, U/S, TTE/DINAH):    < from: MR Head w/wo IV Cont (22 @ 15:23) >    ACC: [id number] EXAM:  MR BRAIN WAW IC                          PROCEDURE DATE:  2022      INTERPRETATION:  CLINICAL INDICATION: Altered mental status, seizures    Magnetic resonance imaging of the brain was carried out with transaxial   SPGR, FLAIR, fast spin echo T2 weighted images, axial susceptibility   weighted series, diffusion weighted series and sagittal T1 weighted   series on a 1.5 Anisa magnet. Post contrast axial, coronal and sagittal   T1 weighted images were obtained. 6 cc of Gadavist were intravenously   injected, 1.5 cc were discarded.    Comparison is made with the prior brain CT of 2022.    Mild to moderate atrophy is identified with ventricular and sulcal   prominence. Extensive small vessel white matter ischemic changes are   noted. There is extensive bifrontal parietal encephalomalacia and gliosis   involving the centrum semiovale ovale in the border zone regions. These   do not demonstrate diffusion restriction and demonstrate bright signal   intensity on T1-weighted images which does not lymphoma on susceptibility   weighted series suggesting laminar necrosis rather than old hemorrhage.   After contrast administration there is mild gyral enhancement in a small   portion of the frontal cortex. There is no evidence of cerebritis or   abscess.    IMPRESSION: Extensive bilateral frontal parietal gliosis and   encephalomalacia with minimal enhancement which may be related to   ischemic changes versus changes of hypertensive encephalopathy in a   patient with renal transplant on immunosuppressants. No evidence of   cerebritis or abscess.    --- End of Report ---    TIMOTHY GOMES MD; Attending Radiologist  This document has been electronically signed. 2022  4:03PM    < end of copied text >    vEEG   EEG SUMMARY/CLASSIFICATION    Abnormal EEG in an altered / a sedated patient.  - Rare right frontal and left frontal sharp waves  - Patient events as described above  - Left frontotemporal slowing  - Mild to moderate generalized slowing    _____________________________________________________________  EEG IMPRESSION/CLINICAL CORRELATE    Abnormal EEG study.  Event of left leg shaking was not epileptic.  Potential epileptogenic foci in the right frontal and left frontal regions.  Structural or functional abnormality in the left frontotemporal region.  Mild to moderate nonspecific diffuse or multifocal cerebral dysfunction.   No seizure seen.    EEG   EEG SUMMARY/CLASSIFICATION    Abnormal EEG in an altered / a sedated patient.  - Infrequent left posterior sharp discharges  - Left frontotemporal slowing  - Mild to moderate generalized slowing    _____________________________________________________________  EEG IMPRESSION/CLINICAL CORRELATE  Structural or functional abnormality in the left frontotemporal region.  Mild to moderate nonspecific diffuse or multifocal cerebral dysfunction.   Findings indicate risk for focal seizures from the left posterior region.     EEG /5   EEG SUMMARY/CLASSIFICATION    Abnormal EEG in an altered / a sedated patient.  - Rare right frontal and left frontal sharp waves  - Patient events as described above  - Left frontotemporal slowing  - Mild to moderate generalized slowing    _____________________________________________________________  EEG IMPRESSION/CLINICAL CORRELATE    Abnormal EEG study.  Event of left leg shaking was not epileptic.  Potential epileptogenic foci in the right frontal and left frontal regions.  Structural or functional abnormality in the left frontotemporal region.  Mild to moderate nonspecific diffuse or multifocal cerebral dysfunction.   No seizure seen.   SUBJECTIVE/INTERVAL HISTORY: Pt seen and examined at bedside. Pt's son also at bedside. Pt reports no issues, feeling well. Per son, pt has been able to ambulate a little for the first time in 7 weeks.     ROS: All systems negative except as documented in Subjective/Interval History    PAST MEDICAL & SURGICAL HISTORY:  Hypertension      Diabetes      Dyslipidemia      CAD (Coronary Artery Disease)  with Stents in 2009 and 2019, s/p off-pump C3L on 20      Hypothyroidism      CVA (cerebral vascular accident)  19 with residual bilateral weakness      Anemia      PEG (percutaneous endoscopic gastrostomy) status  removed 2020      Intubation of airway performed without difficulty  Dec 2019  CVA c/b status epilepticus requiring intubation and PEG in 2019-      ESRD on dialysis  M/W/F      Seizures  after CVA, last seizure was last week 22      History of insertion of stent into coronary artery bypass graft   and       S/P CABG x 3  off pump C3L on 20        FAMILY HISTORY:  Family history of cancer of tongue (Father)  Parents are  . Father - at 80 years, tongue cancer was a smoker. Mother- at 71, had accident while crossing road. Siblings- 2 brothers and one sister.        MEDICATIONS (HOME):  Home Medications:  amLODIPine 10 mg oral tablet: 1 tab(s) enteral once a day (2022 15:45)  apixaban 2.5 mg oral tablet: 1 tab(s) orally 2 times a day (2022 15:45)  atorvastatin 40 mg oral tablet: 1 tab(s) orally once a day (at bedtime) (2022 15:45)  CellCept 500 mg oral tablet: 1 tab(s) orally 2 times a day (2022 15:45)  Coreg 6.25 mg oral tablet: 1 tab(s) orally 2 times a day (2022 15:45)  divalproex sodium 250 mg oral delayed release tablet: 1 tab(s) orally once a day (2022 15:45)  divalproex sodium 500 mg oral delayed release tablet: 1 tab(s) orally 2 times a day (2022 15:45)  Envarsus XR 1 mg oral tablet, extended release: 3 tab(s) by gastrostomy tube once a day (in the morning) (2022 15:45)  fluconazole 200 mg oral tablet: 1 tab(s) orally once a day (2022 15:45)  HumaLOG 100 units/mL injectable solution: 5 unit(s) injectable 3 times a day (2022 15:45)  insulin glargine 100 units/mL subcutaneous solution: 6 unit(s) subcutaneous once a day (at bedtime) (2022 14:27)  levETIRAcetam 250 mg oral tablet: 1 tab(s) orally 2 times a day (2022 15:45)  levothyroxine 50 mcg (0.05 mg) oral tablet: 1 tab(s) orally once a day (2022 15:45)  magnesium oxide 400 mg oral tablet: 1 tab(s) orally 3 times a day (with meals) (2022 15:45)  Multiple Vitamins oral capsule: 1 cap(s) orally once a day (2022 15:45)  pantoprazole 40 mg oral delayed release tablet: 1 tab(s) orally once a day (before a meal) (2022 15:45)  predniSONE: 5 milligram(s) orally once a day (2022 15:45)  senna oral tablet: 2 tab(s) orally once a day (at bedtime) (2022 15:45)  sulfamethoxazole-trimethoprim 400 mg-80 mg oral tablet: 1 tab(s) orally once a day (2022 15:45)  valGANciclovir 450 mg oral tablet: 1 tab(s) orally 3 times a week (2022 15:45)    MEDICATIONS  (STANDING):  carvedilol 25 milliGRAM(s) Oral every 12 hours  chlorhexidine 2% Cloths 1 Application(s) Topical <User Schedule>  doxazosin 4 milliGRAM(s) Oral <User Schedule>  enoxaparin Injectable 60 milliGRAM(s) SubCutaneous <User Schedule>  epoetin cortney-epbx (RETACRIT) Injectable 71537 Unit(s) SubCutaneous every 7 days  finasteride 5 milliGRAM(s) Oral daily  fluconAZOLE IVPB 400 milliGRAM(s) IV Intermittent every 24 hours  insulin glargine Injectable (LANTUS) 5 Unit(s) SubCutaneous at bedtime  insulin lispro (ADMELOG) corrective regimen sliding scale   SubCutaneous three times a day with meals  insulin lispro (ADMELOG) corrective regimen sliding scale   SubCutaneous at bedtime  insulin lispro Injectable (ADMELOG) 4 Unit(s) SubCutaneous three times a day before meals  levothyroxine 50 MICROGram(s) Oral daily  melatonin 6 milliGRAM(s) Oral at bedtime  meropenem  IVPB      meropenem  IVPB 1000 milliGRAM(s) IV Intermittent every 12 hours  NIFEdipine XL 60 milliGRAM(s) Oral daily  pantoprazole    Tablet 40 milliGRAM(s) Oral daily  phenytoin   Capsule 100 milliGRAM(s) Oral every 8 hours  polyethylene glycol 3350 17 Gram(s) Oral daily  predniSONE   Tablet 5 milliGRAM(s) Oral daily  senna 2 Tablet(s) Oral at bedtime  sodium bicarbonate 1300 milliGRAM(s) Oral <User Schedule>  sodium chloride 0.9%. 1000 milliLiter(s) (100 mL/Hr) IV Continuous <Continuous>    MEDICATIONS  (PRN):    ALLERGIES/INTOLERANCES:  Allergies  No Known Allergies    Intolerances    VITALS & EXAMINATION:  Vital Signs Last 24 Hrs  T(C): 38.3 (2022 07:00), Max: 38.8 (2022 03:00)  T(F): 100.9 (2022 07:00), Max: 101.8 (2022 03:00)  HR: 58 (2022 10:00) (55 - 70)  BP: 112/53 (2022 10:00) (112/53 - 172/74)  BP(mean): 77 (2022 10:00) (77 - 114)  RR: 24 (2022 10:00) (17 - 33)  SpO2: 98% (2022 10:00) (95% - 99%)    Parameters below as of 2022 09:53  Patient On (Oxygen Delivery Method): room air    General appearance: does not appear to be in pain  Head: normocephalic  Eyes: clear sclera  Extremities: no ezio LE edema  Respiratory: breathing regularly    Focused neurologic exam:  MS - awake, alert, oriented to person, place "[facility]", "2022", Follows simple commands. Attend to stimuli from both sides.   CN - pupils equal round, EOMI, BTT b/l, no apparent facial asymmetry, hearing intact to conversation b/l   Motor - Normal bulk. Inc tone in L side and normal tone in R side. Spontaneous movements of L > R side and at least antigravity.     Sens - LT/temp intact all, as above  Gait and station - PERRY      LABORATORY:  CBC                       8.5    3.72  )-----------( 392      ( 2022 22:48 )             25.7     Chem 07-    130<L>  |  98  |  96<H>  ----------------------------<  123<H>  4.9   |  16<L>  |  2.67<H>    Ca    8.9      2022 22:48  Phos  4.9       Mg     2.3         TPro  6.6  /  Alb  2.9<L>  /  TBili  0.7  /  DBili  x   /  AST  358<H>  /  ALT  520<H>  /  AlkPhos  341<H>      LFTs LIVER FUNCTIONS - ( 2022 22:48 )  Alb: 2.9 g/dL / Pro: 6.6 g/dL / ALK PHOS: 341 U/L / ALT: 520 U/L / AST: 358 U/L / GGT: x           Coagulopathy PT/INR - ( 2022 22:48 )   PT: 17.1 sec;   INR: 1.47 ratio         PTT - ( 2022 22:48 )  PTT:40.0 sec  Lipid Panel   A1c   Cardiac enzymes     U/A Urinalysis Basic - ( 2022 18:28 )    Color: Yellow / Appearance: Slightly Turbid / S.020 / pH: x  Gluc: x / Ketone: Negative  / Bili: Negative / Urobili: Negative   Blood: x / Protein: 100 / Nitrite: Negative   Leuk Esterase: Large / RBC: 2 /hpf / WBC 40 /HPF   Sq Epi: x / Non Sq Epi: 0 /hpf / Bacteria: Many        STUDIES & IMAGING:  Studies (EKG, EEG, EMG, etc):     Radiology (XR, CT, MR, U/S, TTE/DINAH):    < from: MR Head w/wo IV Cont (22 @ 15:23) >    ACC: [id number] EXAM:  MR BRAIN WAW IC                          PROCEDURE DATE:  2022      INTERPRETATION:  CLINICAL INDICATION: Altered mental status, seizures    Magnetic resonance imaging of the brain was carried out with transaxial   SPGR, FLAIR, fast spin echo T2 weighted images, axial susceptibility   weighted series, diffusion weighted series and sagittal T1 weighted   series on a 1.5 Anisa magnet. Post contrast axial, coronal and sagittal   T1 weighted images were obtained. 6 cc of Gadavist were intravenously   injected, 1.5 cc were discarded.    Comparison is made with the prior brain CT of 2022.    Mild to moderate atrophy is identified with ventricular and sulcal   prominence. Extensive small vessel white matter ischemic changes are   noted. There is extensive bifrontal parietal encephalomalacia and gliosis   involving the centrum semiovale ovale in the border zone regions. These   do not demonstrate diffusion restriction and demonstrate bright signal   intensity on T1-weighted images which does not lymphoma on susceptibility   weighted series suggesting laminar necrosis rather than old hemorrhage.   After contrast administration there is mild gyral enhancement in a small   portion of the frontal cortex. There is no evidence of cerebritis or   abscess.    IMPRESSION: Extensive bilateral frontal parietal gliosis and   encephalomalacia with minimal enhancement which may be related to   ischemic changes versus changes of hypertensive encephalopathy in a   patient with renal transplant on immunosuppressants. No evidence of   cerebritis or abscess.    --- End of Report ---    TIMOTHY GOMES MD; Attending Radiologist  This document has been electronically signed. 2022  4:03PM    < end of copied text >    vEEG   EEG SUMMARY/CLASSIFICATION    Abnormal EEG in an altered / a sedated patient.  - Rare right frontal and left frontal sharp waves  - Patient events as described above  - Left frontotemporal slowing  - Mild to moderate generalized slowing    _____________________________________________________________  EEG IMPRESSION/CLINICAL CORRELATE    Abnormal EEG study.  Event of left leg shaking was not epileptic.  Potential epileptogenic foci in the right frontal and left frontal regions.  Structural or functional abnormality in the left frontotemporal region.  Mild to moderate nonspecific diffuse or multifocal cerebral dysfunction.   No seizure seen.    EEG   EEG SUMMARY/CLASSIFICATION    Abnormal EEG in an altered / a sedated patient.  - Infrequent left posterior sharp discharges  - Left frontotemporal slowing  - Mild to moderate generalized slowing    _____________________________________________________________  EEG IMPRESSION/CLINICAL CORRELATE  Structural or functional abnormality in the left frontotemporal region.  Mild to moderate nonspecific diffuse or multifocal cerebral dysfunction.   Findings indicate risk for focal seizures from the left posterior region.     EEG /5   EEG SUMMARY/CLASSIFICATION    Abnormal EEG in an altered / a sedated patient.  - Rare right frontal and left frontal sharp waves  - Patient events as described above  - Left frontotemporal slowing  - Mild to moderate generalized slowing    _____________________________________________________________  EEG IMPRESSION/CLINICAL CORRELATE    Abnormal EEG study.  Event of left leg shaking was not epileptic.  Potential epileptogenic foci in the right frontal and left frontal regions.  Structural or functional abnormality in the left frontotemporal region.  Mild to moderate nonspecific diffuse or multifocal cerebral dysfunction.   No seizure seen.

## 2022-07-26 NOTE — PROGRESS NOTE ADULT - ASSESSMENT
66 yr old M with Type 2 DM> unknown control due to skewed A1C 6.6% secondary to chronic anemia and s/p blood tx. On basal/bolus insulin therapy PTA. DM c/b ESRD s/p DDRT on 3/21, CVA, CAD s/p CABG, Afib on apixaban, seizure activity, HTN, HLD, h/o GI bleed in 2/2022 EGD with duodenal ulcer, discharged to [facility] rehab center after prior hospitalization, now re-admitted from [facility] for seizures c/f status epilepticus in setting of UTI. endocrine consulted for steroid induced hyperglycemia, now on home dose prednisone 5mg. Prolonged hospital course w/ ICU stay, previously intubated/extubated, previous seizures. S/p ureteral stent removal 7/12/22 pt is now off tube feeds and tolerating PO diet but back to ICU due to sepsis> on antibiotic. Starting to eat better. Noted BG going up this pm. Will increase premeal insulin tomorrow if pt eating consistently and BG are also >180s. BG goal 100 to 180s.

## 2022-07-26 NOTE — PROGRESS NOTE ADULT - SUBJECTIVE AND OBJECTIVE BOX
HISTORY:    24 HOUR EVENTS:    -Readmitted to SICU for leukopenia, rising LFT's and creatinine  -CT Chest/A/P completed, prelim read without emergent findings  -Transplant Hepatology consulted    NEURO  RASS (if intubated): 		[facility] ICU (if concern for delirium):  Exam:   Meds: acetaminophen     Tablet .. 650 milliGRAM(s) Oral every 6 hours PRN Mild Pain (1 - 3)  melatonin 6 milliGRAM(s) Oral at bedtime  phenytoin   Capsule 100 milliGRAM(s) Oral every 8 hours      RESPIRATORY  RR: 22 (07-26-22 @ 00:00) (17 - 28)  SpO2: 97% (07-26-22 @ 00:00) (96% - 99%)  Wt(kg): --  Exam:  Mechanical Ventilation:   ABG - ( 25 Jul 2022 19:00 )  pH: 7.39  /  pCO2: 22    /  pO2: 75    / HCO3: 13    / Base Excess: -10.3 /  SaO2: 96.9    Lactate: x                Meds:     CARDIOVASCULAR  HR: 63 (07-26-22 @ 00:00) (61 - 72)  BP: 149/88 (07-26-22 @ 00:00) (118/55 - 172/74)  BP(mean): 113 (07-26-22 @ 00:00) (82 - 113)  ABP: --  ABP(mean): --  Wt(kg): --  CVP(cm H2O): --  VBG - ( 25 Jul 2022 18:07 )  pH: 7.37  /  pCO2: 27    /  pO2: 43    / HCO3: 16    / Base Excess: -8.6  /  SaO2: 73.1   Lactate: 1.4                Exam:   Cardiac Rhythm:   Perfusion     [ ]Adequate   [ ]Inadequate  Mentation   [ ]Normal       [ ]Reduced  Extremities  [ ]Warm         [ ]Cool  Volume Status [ ]Hypervolemic [ ]Euvolemic [ ]Hypovolemic  Meds: carvedilol 25 milliGRAM(s) Oral every 12 hours  doxazosin 4 milliGRAM(s) Oral <User Schedule>  NIFEdipine XL 60 milliGRAM(s) Oral two times a day      GI/NUTRITION  Exam:   Diet:   Meds: pantoprazole    Tablet 40 milliGRAM(s) Oral daily  polyethylene glycol 3350 17 Gram(s) Oral daily      GENITOURINARY  I&O's Detail    07-24 @ 07:01 - 07-25 @ 07:00  --------------------------------------------------------  IN:    Oral Fluid: 720 mL  Total IN: 720 mL    OUT:    Voided (mL): 750 mL  Total OUT: 750 mL    Total NET: -30 mL      07-25 @ 07:01  -  07-26 @ 01:11  --------------------------------------------------------  IN:    IV PiggyBack: 300 mL    Oral Fluid: 240 mL    PRBCs (Packed Red Blood Cells): 300 mL  Total IN: 840 mL    OUT:    Incontinent per Condom Catheter (mL): 250 mL  Total OUT: 250 mL    Total NET: 590 mL          07-25    130<L>  |  98  |  96<H>  ----------------------------<  123<H>  4.9   |  16<L>  |  2.67<H>    Ca    8.9      25 Jul 2022 22:48  Phos  4.9     07-25  Mg     2.3     07-25    TPro  6.6  /  Alb  2.9<L>  /  TBili  0.7  /  DBili  x   /  AST  358<H>  /  ALT  520<H>  /  AlkPhos  341<H>  07-25    Meds: sodium bicarbonate 1300 milliGRAM(s) Oral <User Schedule>      HEMATOLOGIC  Meds: enoxaparin Injectable 60 milliGRAM(s) SubCutaneous <User Schedule>                          8.5    3.72  )-----------( 392      ( 25 Jul 2022 22:48 )             25.7     PT/INR - ( 25 Jul 2022 22:48 )   PT: 17.1 sec;   INR: 1.47 ratio         PTT - ( 25 Jul 2022 22:48 )  PTT:40.0 sec    INFECTIOUS DISEASES  T(C): 37.9 (07-25-22 @ 23:00), Max: 38.6 (07-25-22 @ 18:00)  Wt(kg): --  WBC Count: 3.72 K/uL (07-25 @ 22:48)  WBC Count: 4.52 K/uL (07-25 @ 18:28)  WBC Count: 2.94 K/uL (07-25 @ 06:29)    Recent Cultures:    Meds: epoetin cortney-epbx (RETACRIT) Injectable 26887 Unit(s) SubCutaneous every 7 days  meropenem  IVPB      meropenem  IVPB 1000 milliGRAM(s) IV Intermittent every 12 hours  nystatin    Suspension 146902 Unit(s) Oral four times a day      ENDOCRINE  Capillary Blood Glucose    Meds: finasteride 5 milliGRAM(s) Oral daily  insulin glargine Injectable (LANTUS) 5 Unit(s) SubCutaneous at bedtime  insulin lispro (ADMELOG) corrective regimen sliding scale   SubCutaneous three times a day with meals  insulin lispro (ADMELOG) corrective regimen sliding scale   SubCutaneous at bedtime  insulin lispro Injectable (ADMELOG) 4 Unit(s) SubCutaneous three times a day before meals  levothyroxine 50 MICROGram(s) Oral daily  predniSONE  Solution 5 milliGRAM(s) Oral daily      ACCESS DEVICES:  [ ] Peripheral IV  [ ] Central Venous Line		[ ] R	[ ] L	[ ] IJ	[ ] Fem	[ ] SC	Placed:   [ ] Arterial Line			[ ] R	[ ] L	[ ] Fem	[ ] Rad	[ ] Ax	Placed:   [ ] PICC:					[ ] Mediport  [ ] Urinary Catheter, Date Placed:   [ ] Necessity of urinary, arterial, and venous catheters discussed    OTHER MEDICATIONS:  chlorhexidine 2% Cloths 1 Application(s) Topical <User Schedule>      IMAGING: 1    24 HOUR EVENTS:    -Readmitted to SICU for leukopenia, rising LFT's and creatinine  -CT Chest/A/P completed, prelim read without emergent findings  -Transplant Hepatology consulted    NEURO  RASS (if intubated): 		[facility] ICU (if concern for delirium):  Exam:   Meds: acetaminophen     Tablet .. 650 milliGRAM(s) Oral every 6 hours PRN Mild Pain (1 - 3)  melatonin 6 milliGRAM(s) Oral at bedtime  phenytoin   Capsule 100 milliGRAM(s) Oral every 8 hours      RESPIRATORY  RR: 22 (07-26-22 @ 00:00) (17 - 28)  SpO2: 97% (07-26-22 @ 00:00) (96% - 99%)  Wt(kg): --  Exam:  Mechanical Ventilation:   ABG - ( 25 Jul 2022 19:00 )  pH: 7.39  /  pCO2: 22    /  pO2: 75    / HCO3: 13    / Base Excess: -10.3 /  SaO2: 96.9    Lactate: x                Meds:     CARDIOVASCULAR  HR: 63 (07-26-22 @ 00:00) (61 - 72)  BP: 149/88 (07-26-22 @ 00:00) (118/55 - 172/74)  BP(mean): 113 (07-26-22 @ 00:00) (82 - 113)  ABP: --  ABP(mean): --  Wt(kg): --  CVP(cm H2O): --  VBG - ( 25 Jul 2022 18:07 )  pH: 7.37  /  pCO2: 27    /  pO2: 43    / HCO3: 16    / Base Excess: -8.6  /  SaO2: 73.1   Lactate: 1.4                Exam:   Cardiac Rhythm:   Perfusion     [ ]Adequate   [ ]Inadequate  Mentation   [ ]Normal       [ ]Reduced  Extremities  [ ]Warm         [ ]Cool  Volume Status [ ]Hypervolemic [ ]Euvolemic [ ]Hypovolemic  Meds: carvedilol 25 milliGRAM(s) Oral every 12 hours  doxazosin 4 milliGRAM(s) Oral <User Schedule>  NIFEdipine XL 60 milliGRAM(s) Oral two times a day      GI/NUTRITION  Exam:   Diet:   Meds: pantoprazole    Tablet 40 milliGRAM(s) Oral daily  polyethylene glycol 3350 17 Gram(s) Oral daily      GENITOURINARY  I&O's Detail    07-24 @ 07:01 - 07-25 @ 07:00  --------------------------------------------------------  IN:    Oral Fluid: 720 mL  Total IN: 720 mL    OUT:    Voided (mL): 750 mL  Total OUT: 750 mL    Total NET: -30 mL      07-25 @ 07:01  -  07-26 @ 01:11  --------------------------------------------------------  IN:    IV PiggyBack: 300 mL    Oral Fluid: 240 mL    PRBCs (Packed Red Blood Cells): 300 mL  Total IN: 840 mL    OUT:    Incontinent per Condom Catheter (mL): 250 mL  Total OUT: 250 mL    Total NET: 590 mL          07-25    130<L>  |  98  |  96<H>  ----------------------------<  123<H>  4.9   |  16<L>  |  2.67<H>    Ca    8.9      25 Jul 2022 22:48  Phos  4.9     07-25  Mg     2.3     07-25    TPro  6.6  /  Alb  2.9<L>  /  TBili  0.7  /  DBili  x   /  AST  358<H>  /  ALT  520<H>  /  AlkPhos  341<H>  07-25    Meds: sodium bicarbonate 1300 milliGRAM(s) Oral <User Schedule>      HEMATOLOGIC  Meds: enoxaparin Injectable 60 milliGRAM(s) SubCutaneous <User Schedule>                          8.5    3.72  )-----------( 392      ( 25 Jul 2022 22:48 )             25.7     PT/INR - ( 25 Jul 2022 22:48 )   PT: 17.1 sec;   INR: 1.47 ratio         PTT - ( 25 Jul 2022 22:48 )  PTT:40.0 sec    INFECTIOUS DISEASES  T(C): 37.9 (07-25-22 @ 23:00), Max: 38.6 (07-25-22 @ 18:00)  Wt(kg): --  WBC Count: 3.72 K/uL (07-25 @ 22:48)  WBC Count: 4.52 K/uL (07-25 @ 18:28)  WBC Count: 2.94 K/uL (07-25 @ 06:29)    Recent Cultures:    Meds: epoetin cortney-epbx (RETACRIT) Injectable 98575 Unit(s) SubCutaneous every 7 days  meropenem  IVPB      meropenem  IVPB 1000 milliGRAM(s) IV Intermittent every 12 hours  nystatin    Suspension 226603 Unit(s) Oral four times a day      ENDOCRINE  Capillary Blood Glucose    Meds: finasteride 5 milliGRAM(s) Oral daily  insulin glargine Injectable (LANTUS) 5 Unit(s) SubCutaneous at bedtime  insulin lispro (ADMELOG) corrective regimen sliding scale   SubCutaneous three times a day with meals  insulin lispro (ADMELOG) corrective regimen sliding scale   SubCutaneous at bedtime  insulin lispro Injectable (ADMELOG) 4 Unit(s) SubCutaneous three times a day before meals  levothyroxine 50 MICROGram(s) Oral daily  predniSONE  Solution 5 milliGRAM(s) Oral daily      ACCESS DEVICES:  [ ] Peripheral IV  [ ] Central Venous Line		[ ] R	[ ] L	[ ] IJ	[ ] Fem	[ ] SC	Placed:   [ ] Arterial Line			[ ] R	[ ] L	[ ] Fem	[ ] Rad	[ ] Ax	Placed:   [ ] PICC:					[ ] Mediport  [ ] Urinary Catheter, Date Placed:   [ ] Necessity of urinary, arterial, and venous catheters discussed    OTHER MEDICATIONS:  chlorhexidine 2% Cloths 1 Application(s) Topical <User Schedule>      IMAGING:

## 2022-07-26 NOTE — PROGRESS NOTE ADULT - SUBJECTIVE AND OBJECTIVE BOX
Transplant Surgery Multidisciplinary Progress Note   --------------------------------------------------------------  R DCD DDRT    22    Present:  Patient seen and examined with multidisciplinary team including Transplant Surgeon: Dr. Tena, Transplant Nephrologist: Dr. Alfred, Nephrology Fellow Dr. Thompson, NP Altagracia Montelongo  and unit RN during am rounds.  Disciplines not in attendance will be notified of the plan.    HPI: 66M with PMH: DM type II, HTN, CAD s/p CABG in , AFib on Eliquis, CVA in  due to Afib, Seizure d/o following CVA, last episode was on 22, h/o GIB in 2022 EGD with duodenal ulcer.  ESRD due to DM was on HD since .     s/p R DCD DDRT on 22.  Donor was 58 , KDPI 82%, DCD, single vessels and ureter, HLA mismatch 1, 2, 2. No DSA, cPRA 0%. CMV +/+  Course complicated by DGF, was on HD until 22. Re-admitted in May for anemia in the setting of large perinephric hematoma s/p evacuation and repair of arterial anastomosis on 22. Intra operative biopsy showed no rejection, Creatinine ranging ~2mg/dL.    In Rehab:  He was recently dx with klebsiella UTI rx with ertapenem - then switched to Levaquin day #6.    He also had parainfluenza pneumonia. Was at rehab progressing well.      Hospital course:  - 6/10: transferred from rehab facility for seizure status epilepticus. Intubated for respiratory protection and transferred to MICU.  EEG showed no seizure activity. Neuro consulted.   - : Extubated. Found to have R pleural effusion. s/p Thoracentesis 1.3L. Eliquis changed to heparin drip.  Post procedure drop in H&H. 5u PRBC, 2 u FFP.   -  evening: Transferred to SICU from MICU for further care in setting of hypoxia, significant R pleural effusion, anemia and hemodynamic instability  -  Intubated at 9pm and sedated on Precedex.  CT placed, 600ml blood drained.  1L total overnight, started pressors  -  Received Protamine for PTT >100 (was on Heparin drip started for AF), 2 u FFP and 5u PRBC total  -  s/p VATS 2.2L old blood removed; second chest tube placed  - -: chest tubes removed  - : ?PRES vs. chronic ischemic changes on MRI, off Envarsus, switched to Belatacept  with good graft function  - : Tx to SICU for refractory seizures, improved with IV antiepileptic regimen  - 7/10: Passed bedside S&S in SICU. Downgraded from SICU to floor.   - : OR-->URETERAL STENT REMOVED  - 7/15: ESBL Kleb UTI (50-90K), completed Ertapenem x 3 Days  - : Tx to SICU for Sepsis/ elevated LFTS    Interval Events:  - Tx to SICU-  Febrile Tmax 38.8/  inc WOB  - Blood Cx/ UA/ Ur Cx sent, RVP neg; s/p vanc x 1, started on miguel  - CT CAP pending read  - Oliguric Uop ~ 430 ml;  Uptrending Sr. Cr 1.8 -> 2.3-> 2.67  - UA pos WBC 40/ large Leuk esterase, bacteria many  - Acute rise in LFTS; hepatology consulted:  liver doppler neg / off hydralazine, lipitor, bactrim   - S/p 1 u PRBC w/ response       Immunosuppression:   Induction:  Thymoglobulin                                        Maintenance:  - Belatacept: , , , . Next dose   - MMF held (was on 500 BID leukopenia), Pred 5mg daily   - Ongoing monitoring for signs of rejection.    Potential Discharge date: pending clinical improvement   Education:  Medications  Plan of care:  See Below        MEDICATIONS  (STANDING):  carvedilol 25 milliGRAM(s) Oral every 12 hours  chlorhexidine 2% Cloths 1 Application(s) Topical <User Schedule>  doxazosin 4 milliGRAM(s) Oral <User Schedule>  enoxaparin Injectable 60 milliGRAM(s) SubCutaneous <User Schedule>  epoetin cortney-epbx (RETACRIT) Injectable 34866 Unit(s) SubCutaneous every 7 days  finasteride 5 milliGRAM(s) Oral daily  fluconAZOLE IVPB 400 milliGRAM(s) IV Intermittent every 24 hours  insulin glargine Injectable (LANTUS) 5 Unit(s) SubCutaneous at bedtime  insulin lispro (ADMELOG) corrective regimen sliding scale   SubCutaneous three times a day with meals  insulin lispro (ADMELOG) corrective regimen sliding scale   SubCutaneous at bedtime  insulin lispro Injectable (ADMELOG) 4 Unit(s) SubCutaneous three times a day before meals  levothyroxine 50 MICROGram(s) Oral daily  melatonin 6 milliGRAM(s) Oral at bedtime  meropenem  IVPB      meropenem  IVPB 1000 milliGRAM(s) IV Intermittent every 12 hours  NIFEdipine XL 60 milliGRAM(s) Oral two times a day  pantoprazole    Tablet 40 milliGRAM(s) Oral daily  phenytoin   Capsule 100 milliGRAM(s) Oral every 8 hours  polyethylene glycol 3350 17 Gram(s) Oral daily  predniSONE   Tablet 5 milliGRAM(s) Oral daily  senna 2 Tablet(s) Oral at bedtime  sodium bicarbonate 1300 milliGRAM(s) Oral <User Schedule>    MEDICATIONS  (PRN):        Vital Signs Last 24 Hrs  T(C): 38.3 (2022 07:00), Max: 38.8 (2022 03:00)  T(F): 100.9 (2022 07:00), Max: 101.8 (2022 03:00)  HR: 58 (2022 10:00) (55 - 70)  BP: 112/53 (2022 10:00) (112/53 - 172/74)  BP(mean): 77 (2022 10:00) (77 - 114)  RR: 24 (2022 10:00) (17 - 33)  SpO2: 98% (2022 10:00) (95% - 99%)    Parameters below as of 2022 09:53  Patient On (Oxygen Delivery Method): room air        I&O's Summary    2022 07:01  -  2022 07:00  --------------------------------------------------------  IN: 890 mL / OUT: 440 mL / NET: 450 mL    2022 07:01  -  2022 10:54  --------------------------------------------------------  IN: 400 mL / OUT: 0 mL / NET: 400 mL          LABS:                        8.5    3.72  )-----------( 392      ( 2022 22:48 )             25.7     07-    130<L>  |  98  |  96<H>  ----------------------------<  123<H>  4.9   |  16<L>  |  2.67<H>    Ca    8.9      2022 22:48  Phos  4.9       Mg     2.3         TPro  6.6  /  Alb  2.9<L>  /  TBili  0.7  /  DBili  x   /  AST  358<H>  /  ALT  520<H>  /  AlkPhos  341<H>      PT/INR - ( 2022 22:48 )   PT: 17.1 sec;   INR: 1.47 ratio         PTT - ( 2022 22:48 )  PTT:40.0 sec  Urinalysis Basic - ( 2022 18:28 )    Color: Yellow / Appearance: Slightly Turbid / S.020 / pH: x  Gluc: x / Ketone: Negative  / Bili: Negative / Urobili: Negative   Blood: x / Protein: 100 / Nitrite: Negative   Leuk Esterase: Large / RBC: 2 /hpf / WBC 40 /HPF   Sq Epi: x / Non Sq Epi: 0 /hpf / Bacteria: Many      CAPILLARY BLOOD GLUCOSE      POCT Blood Glucose.: 139 mg/dL (2022 08:22)  POCT Blood Glucose.: 121 mg/dL (2022 22:31)  POCT Blood Glucose.: 173 mg/dL (2022 18:09)  POCT Blood Glucose.: 189 mg/dL (2022 11:32)    LIVER FUNCTIONS - ( 2022 22:48 )  Alb: 2.9 g/dL / Pro: 6.6 g/dL / ALK PHOS: 341 U/L / ALT: 520 U/L / AST: 358 U/L / GGT: x                 Cultures:        REVIEW OF SYSTEMS  ------------------------------------  Gen: +fatigue, + fevers, +weakness   Skin: No rashes  Head/Eyes/Ears/Mouth: No headache; Normal hearing; Normal vision w/o blurriness; No sinus pain/discomfort, sore throat  Respiratory: No dyspnea, cough, wheezing, hemoptysis  CV: No chest pain, PND, orthopnea  GI: No abdominal pain, diarrhea, constipation, nausea, vomiting, melena, hematochezia  : No increased frequency, dysuria, hematuria, nocturia  MSK: No joint pain/swelling; no back pain; no edema  Neuro: No dizziness/lightheadedness, numbness, tingling, h/o seizures, +weakness  Heme: No easy bruising or bleeding  Endo: No heat/cold intolerance  Psych: No significant nervousness, anxiety, stress, depression  All other systems were reviewed and are negative, except as noted.      PHYSICAL EXAM:  Constitutional: Frail looking   Eyes: Anicteric, PERRLA  ENMT: nc/at,   Neck: supple  Respiratory: CTA   Cardiovascular: RRR  Gastrointestinal: Soft, non distended, NT, RLQ incision healed   Genitourinary: tucker insitu   Extremities: SCD's in place and working bilaterally, overall edema has improved  Vascular: Palpable dp pulses bilaterally  Neurological: AAOx3; sleepy/ arousable   Skin: no rashes, ulcerations or lesions  Musculoskeletal: Moving all extremities, global weakness  Psychiatric: calm, follows commands and interacts appropriately

## 2022-07-26 NOTE — PROGRESS NOTE ADULT - SUBJECTIVE AND OBJECTIVE BOX
[facility]   MD NINA MASON MD, DO SADIE RAMIREZ NP    TEL:  OFFICE: [phone]    From 5pm-7am Answering Service [phone]    -- RENAL FOLLOW UP NOTE ---Date of Service 07-26-22 @ 14:44    Patient is a 66y old  Male who presents with a chief complaint of Status Epilepticus (26 Jul 2022 12:02)      Patient seen and examined in ICU.     VITALS:  T(F): 99.3 (07-26-22 @ 11:00), Max: 101.8 (07-26-22 @ 03:00)  HR: 56 (07-26-22 @ 13:00)  BP: 108/60 (07-26-22 @ 13:00)  RR: 25 (07-26-22 @ 13:00)  SpO2: 97% (07-26-22 @ 13:00)  Wt(kg): --    07-25 @ 07:01  -  07-26 @ 07:00  --------------------------------------------------------  IN: 890 mL / OUT: 440 mL / NET: 450 mL    07-26 @ 07:01  -  07-26 @ 14:44  --------------------------------------------------------  IN: 600 mL / OUT: 40 mL / NET: 560 mL          PHYSICAL EXAM:  Constitutional: NAD  Neck: No JVD  Respiratory: CTAB, no wheezes, rales or rhonchi  Cardiovascular: S1, S2, RRR  Gastrointestinal: BS+, soft, NT/ND  Extremities: No peripheral edema    Hospital Medications:   MEDICATIONS  (STANDING):  carvedilol 25 milliGRAM(s) Oral every 12 hours  chlorhexidine 2% Cloths 1 Application(s) Topical <User Schedule>  doxazosin 4 milliGRAM(s) Oral <User Schedule>  enoxaparin Injectable 60 milliGRAM(s) SubCutaneous <User Schedule>  epoetin cortney-epbx (RETACRIT) Injectable 04041 Unit(s) SubCutaneous every 7 days  finasteride 5 milliGRAM(s) Oral daily  fluconAZOLE IVPB 400 milliGRAM(s) IV Intermittent every 24 hours  insulin glargine Injectable (LANTUS) 5 Unit(s) SubCutaneous at bedtime  insulin lispro (ADMELOG) corrective regimen sliding scale   SubCutaneous three times a day with meals  insulin lispro (ADMELOG) corrective regimen sliding scale   SubCutaneous at bedtime  insulin lispro Injectable (ADMELOG) 4 Unit(s) SubCutaneous three times a day before meals  levothyroxine 50 MICROGram(s) Oral daily  melatonin 6 milliGRAM(s) Oral at bedtime  meropenem  IVPB      meropenem  IVPB 1000 milliGRAM(s) IV Intermittent every 12 hours  NIFEdipine XL 60 milliGRAM(s) Oral daily  pantoprazole    Tablet 40 milliGRAM(s) Oral daily  phenytoin   Capsule 100 milliGRAM(s) Oral every 8 hours  polyethylene glycol 3350 17 Gram(s) Oral daily  predniSONE   Tablet 5 milliGRAM(s) Oral daily  senna 2 Tablet(s) Oral at bedtime  sodium bicarbonate 1300 milliGRAM(s) Oral <User Schedule>  sodium chloride 0.9%. 1000 milliLiter(s) (100 mL/Hr) IV Continuous <Continuous>      LABS:  07-25    130<L>  |  98  |  96<H>  ----------------------------<  123<H>  4.9   |  16<L>  |  2.67<H>    Ca    8.9      25 Jul 2022 22:48  Phos  4.9     07-25  Mg     2.3     07-25    TPro  6.6  /  Alb  2.9<L>  /  TBili  0.7  /  DBili      /  AST  358<H>  /  ALT  520<H>  /  AlkPhos  341<H>  07-25    Creatinine Trend: 2.67 <--, 2.45 <--, 2.22 <--, 2.13 <--, 1.79 <--, 1.80 <--, 1.50 <--, 1.43 <--, 1.37 <--, 1.50 <--    Albumin, Serum: 2.9 g/dL (07-25 @ 22:48)  Phosphorus Level, Serum: 4.9 mg/dL (07-25 @ 22:48)  Albumin, Serum: 2.9 g/dL (07-25 @ 18:28)  Phosphorus Level, Serum: 4.9 mg/dL (07-25 @ 18:28)                              8.5    3.72  )-----------( 392      ( 25 Jul 2022 22:48 )             25.7     Urine Studies:  Urinalysis - [07-25-22 @ 18:28]      Color Yellow / Appearance Slightly Turbid / SG 1.020 / pH 5.5      Gluc Negative / Ketone Negative  / Bili Negative / Urobili Negative       Blood Small / Protein 100 / Leuk Est Large / Nitrite Negative      RBC 2 / WBC 40 / Hyaline 5 / Gran  / Sq Epi  / Non Sq Epi 0 / Bacteria Many    Urine Creatinine 129      [07-26-22 @ 04:17]  Urine Sodium 20      [07-26-22 @ 04:17]  Urine Osmolality 387      [07-26-22 @ 04:17]    Iron 17, TIBC 171, %sat 10      [05-02-22 @ 13:03]  Ferritin 1505      [05-02-22 @ 13:05]  PTH -- (Ca 9.2)      [02-05-22 @ 10:13]   294  HbA1c 6.0      [02-21-20 @ 08:53]  TSH 4.69      [07-06-22 @ 18:36]    HBsAg Nonreact      [07-25-22 @ 11:51]  HCV 0.18, Nonreact      [07-25-22 @ 11:51]    Syphilis Screen (Treponema Pallidum Ab) Negative      [06-27-22 @ 02:00]    RADIOLOGY & ADDITIONAL STUDIES:

## 2022-07-26 NOTE — PROGRESS NOTE ADULT - ATTENDING COMMENTS
Interval History as per resident note, personally verified by me. Doing better, patient without complaints and resting comfortably in chair. Son, at bedside, says patient continues to be lucid and has been able to ambulate and no further seizures reported. He was moved into ICU for worsening medical problems and observation. Prior to this was on floor and awaiting placement in [facility].    Focused neurologic exam:  MS Ricarda RASMUSSEN x 2.5 (knew 7/2022 but not rest of time), speech fluent, follows simple commands. Rep/naming, attn/conc, recent and remote memory, fund of knowledge dec  CN - PERRL, EOMI, VFF, face sens/str intact b/l, hearing intact to conversation b/l, SCM at least 4+/5 b/l and symmetric, tongue/palate midline  Motor - Normal bulk. Inc tone in L side (paratonic?) and normal tone in R side. R side 4-4+/5, L side 4+/5  Sens - LT/temp intact all, as above  DTR's - 2+ BUE's, 3+ R KJ, 2+ L KJ, 1+ AJ b/l, and neutral R and downgoing L plantar response  Coord - Grossly appropriate for level of strength  Gait and station - Due to fall risk/safety concerns did not assess    A/P:  Epilepsy, intractable, with resolved status epilepticus  Encephalopathy  S/p renal transplant, ANA  Prior strokes  Atrial fibrillation  CAD  HTN  DM type 2  Transaminitis    - Patient with multiple non-convulsive electrographic seizures that were initially refractory. These only presented as worsening mental status. He was also hypothermic with lower WBC. Seizures, leukocytosis, and hypothermia have since resolved on current therapy. Although CNS infection should be on the differential, especially given immunocompromised state with renal transplant, given low temperature and low WBC would favor more systemic process as opposed to primary CNS. Appreciate ID input  - vEEG without seizures, including LLE movements without EEG correlate. STOPPED  - Phenytoin corrected level subtherapeutic but clinically he is doing well and without seizures or side effects. Would continue on current dose of phenytoin 100mg M2rwycv and STOP checking levels unless he has additional seizures. The phenytoin is NOT causing his transaminitis as his level has consistently been < 15, no recent changes made, and no cutaneous or other systemic effects to indicate a toxicity  - Behavioral changes noted, stopped Fycompa  - Vimpat and VPA stopped. Due to sedation also stopped standing Ativan  - LP done and appeared fairly bland. Minimal increase of protein to 46 (from normal ceiling of 45) is non-specific but may be related to recently resolved status epilepticus. Further results unrevealing  - Discussed restarting Eliquis for secondary stroke prevention with family and kidney transplant service. As this interacts with phenytoin would not be as ideal as it would be at lower efficacy. Also not optimal to change AED's given patient has experienced a number of side effects and he has a lower threshold for further seizures. Best options would be either Lovenox or warfarin (adjust to goal INR 2-3) long term. Family and other medical teams are in agreement  - Repeat MRI brain w/ and w/o without clear evidence of infection, inflammation, or acute CNS event (possibly minimal enhancement). 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

## 2022-07-26 NOTE — PROGRESS NOTE ADULT - PROBLEM SELECTOR PLAN 4
c/w LT4 50 mcg daily   Please make sure LT4 is given on an empty stomach at least one hour apart from other meds and food to ensure med absorption. DO NOT GIVE WITH VITAMINS/ANTACIDS  - monitor TFTs outpatient in 4 weeks after dc with Dr. Lincoln

## 2022-07-26 NOTE — PROGRESS NOTE ADULT - ASSESSMENT
66 yr old Male with PMHx ESRD s/p permacath removal 5/10/22 s/p Rt DDRT 4/21/22,  CVA (2019), Afib on apixaban, seizure activity, CAD s/p stents, CABG (2020), HTN, HLD, DM on insulin, gastric/duodenal ulcer, recent hospitalization 4/28/22 -5/10/22 with weakness/anemia found to have perinephric hematoma requiring evacuation and repair of bleeding arterial anastomosis. Curently being treated for UTI with Levaquin Today after developing multiple sz episodes refractory to 5 mg versed IM (EMS) and 2 mg ativan IV in E.D. concerning for status epilepticus requiring intubation for hypoxic respiratory  failure. MICU Consult was called for hypoxic respiratory failure secondary to status epilepticus.  Nephrology consulted for renal failure.     A/P  DDRT on 04/21/22  Course complicated by DGF, was on HD until 4/29/22. Readmitted in May for anemia in the setting of large perinephric hematoma s/p evacuation and repair of arterial anastomosis on 5/2/22. Intra operative biopsy showed no rejection.  ANA was initially sec to  hemodynamic change   stent removal 07/12/22  scr trending up,   follow up BK PRCR and adenovirus, repeat renal US tomorrow,   follow up UA, urine and blood cultures   Immunosuppression per transplant team  transplant team on board   allograft US, DSA negative   monitor BMP, U/O     HYponatremia   avoid hypotonic solution   optimize glucose   monitor BMP closely     HTN   optimal   manage by transplant team    monitor BP closely          66 yr old Male with PMHx ESRD s/p permacath removal 5/10/22 s/p Rt DDRT 4/21/22,  CVA (2019), Afib on apixaban, seizure activity, CAD s/p stents, CABG (2020), HTN, HLD, DM on insulin, gastric/duodenal ulcer, recent hospitalization 4/28/22 -5/10/22 with weakness/anemia found to have perinephric hematoma requiring evacuation and repair of bleeding arterial anastomosis. Curently being treated for UTI with Levaquin Today after developing multiple sz episodes refractory to 5 mg versed IM (EMS) and 2 mg ativan IV in E.D. concerning for status epilepticus requiring intubation for hypoxic respiratory  failure. MICU Consult was called for hypoxic respiratory failure secondary to status epilepticus.  Nephrology consulted for renal failure.     A/P  DDRT on 04/21/22  Course complicated by DGF, was on HD until 4/29/22. Readmitted in May for anemia in the setting of large perinephric hematoma s/p evacuation and repair of arterial anastomosis on 5/2/22. Intra operative biopsy showed no rejection.  ANA was initially sec to  hemodynamic change   stent removal 07/12/22  scr trending up  Febrile, etiology?, being w/u by ID   follow up BK PRCR and adenovirus, repeat renal US   follow up UA, urine and blood cultures   Immunosuppression per transplant team  transplant team on board   allograft US, DSA negative   monitor BMP, U/O     HYponatremia   avoid hypotonic solution   optimize glucose   monitor BMP closely     HTN   optimal   manage by transplant team    monitor BP closely

## 2022-07-26 NOTE — PROGRESS NOTE ADULT - ATTENDING COMMENTS
Renal transplant recipient with prolonged admission  Now with fevers and transaminitis  CT C/A/P without obvious infectious focus  Would continue Meropenem pending cultures  Would follow viral PCR studies as this is possible etiology given transaminitis and fevers.     I will be away tomorrow. Please contact the Infectious Diseases Office to contact the covering Infectious Diseases Attending.     Carlton Hoover M.D.  [facility] Division of Infectious Disease  8AM-5PM Monday - Friday: Available on [employer] Teams  After Hours and Holidays (or if no response on [employer] Teams): Please contact the Infectious Diseases Office at [phone]

## 2022-07-26 NOTE — PROGRESS NOTE ADULT - CRITICAL CARE ATTENDING COMMENT
HISTORY: 67 yo male with complex medical and surgical history and multiple recent admissions. Now s/p DCD DDKT on 4/21/22, transferred to SICU with rising Cr, LFTs and fever.     - Multimodal pain management.  - No clinical seizure activity, continue current management.  - Monitor MS, appears to be improving.  - CT chest negative. Aggressive pulmonary toilet. IS.  - Renal US patent graft. Monitor Cr and UOP.  - LFTs trending down, hepatology following. Monitor.  - Full ID workup sent, adding herpes w/u. Monitor cultures and results. ID following.  - Repeat TTE, evaluate for endocarditis.  - Continue meropenem, vancomycin x1. Add fluconazole.   - Place Dobbhoff, start TFs, dietary consult.  - Leukopenia, s/p 1 dose filgrastim, discuss with ID for repeat dosing.  - At risk for hemodynamic instability and organ/system failure. Continue SICU e/m. HISTORY: 67 yo male with complex medical and surgical history and multiple recent admissions. Now s/p DCD DDKT on 4/21/22, transferred to SICU with rising Cr, LFTs and fever.     - Multimodal pain management.  - No clinical seizure activity, continue current management.  - Monitor MS, appears to be improving.  - CT chest negative. Aggressive pulmonary toilet. IS.  - Renal US patent graft. Monitor Cr and UOP. UOP trending down, FeNa 0.3, MIVF to 100cc/kg/h.  - LFTs trending down, hepatology following. Monitor.  - Full ID workup sent, adding herpes w/u. Monitor cultures and results. ID following.  - Repeat TTE, evaluate for endocarditis.  - Continue meropenem, vancomycin x1. Add fluconazole.  - Lactate 1.5, trend.   - Place Dobbhoff, start TFs, dietary consult.  - Leukopenia, s/p 1 dose filgrastim, discuss with ID for repeat dosing.  - At risk for hemodynamic instability and organ/system failure. Continue SICU e/m.

## 2022-07-26 NOTE — PROGRESS NOTE ADULT - NUTRITIONAL ASSESSMENT
Diet, Consistent Carbohydrate w/Evening Snack:   1200mL Fluid Restriction ([id number])  Supplement Feeding Modality:  Oral  Nepro Cans or Servings Per Day:  2       Frequency:  Daily (07-25-22 @ 10:54) [Active]      Please see RD assessment and/or follow up.  Managed by primary team as well

## 2022-07-27 NOTE — PROGRESS NOTE ADULT - ASSESSMENT
66 yr old Male with PMHx ESRD s/p permacath removal 5/10/22 s/p Rt DDRT 4/21/22,  CVA (2019), Afib on apixaban, seizure activity, CAD s/p stents, CABG (2020), HTN, HLD, DM on insulin, gastric/duodenal ulcer, recent hospitalization 4/28/22 -5/10/22 with weakness/anemia found to have perinephric hematoma requiring evacuation and repair of bleeding arterial anastomosis. Curently being treated for UTI with Levaquin Today after developing multiple sz episodes refractory to 5 mg versed IM (EMS) and 2 mg ativan IV in E.D. concerning for status epilepticus requiring intubation for hypoxic respiratory  failure. MICU Consult was called for hypoxic respiratory failure secondary to status epilepticus.  Nephrology consulted for renal failure.     A/P  DDRT on 04/21/22  Course complicated by DGF, was on HD until 4/29/22. Readmitted in May for anemia in the setting of large perinephric hematoma s/p evacuation and repair of arterial anastomosis on 5/2/22. Intra operative biopsy showed no rejection.  ANA was initially sec to  hemodynamic change   stent removal 07/12/22  scr trending up  Febrile, etiology?, being w/u by ID   No hydronephrosis noted on renal US. UA done on 7/25/22 showed LE and pyuria.   Follow BK PCR and adenovirus.   Follow urine and blood cultures.  follow fungal cultures.   Pt with worsening acidosis and oliguric   planning for HD today;  HD per transplant team   Immunosuppression per transplant team  transplant team on board   allograft US, DSA negative   monitor BMP, U/O     HYponatremia   avoid hypotonic solution   optimize glucose   monitor BMP closely     HTN   optimal   manage by transplant team    monitor BP closely

## 2022-07-27 NOTE — PROCEDURE NOTE - NSSECUREBY_VASC_A_CORE
tape
stabilization device
stabilization device/sterile occulsive dressing/sterile pressure dressing/sutures/tape

## 2022-07-27 NOTE — PROGRESS NOTE ADULT - PROVIDER SPECIALTY LIST ADULT
Internal Medicine Detail Level: Simple Additional Notes: Patient consent was obtained to proceed with the visit and recommended plan of care after discussion of all risks and benefits, including the risks of COVID-19 exposure.

## 2022-07-27 NOTE — PRE PROCEDURE NOTE - PRE PROCEDURE EVALUATION
Interventional Radiology    HPI: 66y Male s/p DDRT presented to IR for non tunneled HD catheter placement for HD.          Allergies:   Medications (Abx/Cardiac/Anticoagulation/Blood Products)    carvedilol: 25 milliGRAM(s) Oral (07-27 @ 05:10)  doxazosin: 4 milliGRAM(s) Oral (07-27 @ 04:26)  enoxaparin Injectable: 60 milliGRAM(s) SubCutaneous (07-26 @ 08:47)  fluconAZOLE IVPB: 100 mL/Hr IV Intermittent (07-27 @ 08:18)  meropenem  IVPB: 100 mL/Hr IV Intermittent (07-27 @ 05:10)  NIFEdipine XL: 60 milliGRAM(s) Oral (07-26 @ 05:09)  NIFEdipine XL: 60 milliGRAM(s) Oral (07-27 @ 05:10)  nystatin    Suspension: 599152 Unit(s) Oral (07-26 @ 05:09)  vancomycin  IVPB: 250 mL/Hr IV Intermittent (07-25 @ 18:16)    Data:    T(C): 37.7  HR: 55  BP: 95/51  RR: 20  SpO2: 98%    Acute respiratory failure with hypoxia    No pertinent family history in first degree relatives    No pertinent family history in first degree relatives    No pertinent family history in first degree relatives    FH: HTN (hypertension)    No pertinent family history in first degree relatives    Family history of cancer of tongue (Father)    Handoff    MEWS Score    Hypertension    Diabetes    Dyslipidemia    CAD (Coronary Artery Disease)    CAD (Coronary Artery Disease)    CRI (Chronic Renal Insufficiency)    Hypothyroidism    CVA (cerebral vascular accident)    Anemia    PEG (percutaneous endoscopic gastrostomy) status    Intubation of airway performed without difficulty    ESRD on dialysis    Seizures    Status post kidney transplant    Status post kidney transplant    Acute respiratory failure with hypoxia    Uncontrolled seizures    Renal transplant recipient    Immunosuppressive management encounter following kidney transplant    Hemothorax    Hypothyroidism    Type 2 diabetes mellitus with hypoglycemia, with long-term current use of insulin    HTN (hypertension)    HLD (hyperlipidemia)    Delirium    Hyperkalemia    Hoarseness    Hoarseness    Lumbar puncture    Flexible cystoscopy    Cystoscopy with complex removal of stent    No significant past surgical history    History of insertion of stent into coronary artery bypass graft    S/P CABG x 3    SEIZURE    30    Convulsive status epilepticus    [email or web address]        Exam  General: No acute distress  Chest: Non labored breathing    -WBC 2.97 / HgB 7.9 / Hct 23.7 / Plt 315  -Na 128 / Cl 95 /  / Glucose 152  -K 5.2 / CO2 16 / Cr 3.77  - / Alk Phos 301 / T.Bili 0.4  -INR1.40    Imaging:     Plan: 66y Male presents for non tunneled HD Catheter placement.   -Risks/Benefits/alternatives explained with the patient  and witnessed informed consent obtained.

## 2022-07-27 NOTE — PROGRESS NOTE ADULT - SUBJECTIVE AND OBJECTIVE BOX
[facility] DIVISION OF KIDNEY DISEASES AND HYPERTENSION   FOLLOW UP NOTE    --------------------------------------------------------------------------------  HPI: 66 yr old man with h/o ESRD due to DM on HD since 2019, s/p DDRT from DCD donor on 4/21/22 complicated by DGF requiring HD until 4/29/22.   PMH: DM type II, HTN, CAD s/p CABG in 2020, Afib on Eliquis, CVA in 2019 due to Afib, Seizure d/o following CVA, h/o GI bleed in 2/2022 EGD with duodenal ulcer.  Donor was 58 , KDPI 82%, DCD, single vessels and ureter, HLA mismatch 1, 2, 2. No DSA, cPRA 0%. CMV +/+    Hospitalized May 2022 for anemia in the setting of large perinephric hematoma s/p evacuation and repair of arterial anastomosis on 5/2/22. Intra operative biopsy showed no rejection. H/o seizure d/o with last seizure episode prior to transplant was on 4/8/22. He is readmitted for status epilepticus in the setting of sub therapeutic valproic acid levels. Course complicated by respiratory failure, hemothorax and hemorrhagic shock following right thoracentesis done on 6/10 for effusion. S/p PRBC x 5 units and vasopressors, chest tube, followed by VATS.    Envarsus discontinued and started on belatacept due to concern for neurotoxicity. Allograft function is stable, mental status is slowly improving. He is on fosphenytoin with good control of seizures. Renal sonogram done on 7/19 showed no hydronephrosis. MARA was not detected on US. DSA sent on 7/19/22 were negative.    Pt was noted to have worsening renal function with SCr peaked to 2.13. Other labs significant for elevated LFTs and SNa of 128. Pt also developed fevers and has persistent luecopenia. Pt transferred to SICU on 7/25/22 for closer monitoring. CT /p negative for any collections/abscess. No hydronephrosis noted on renal US. UA done on 7/25/22 showed LE and pyuria. Pt overnight continues to spike fevers with Tmax of 38.4 C and decreasing urine output. Also noted to have worsening Sco2 and was started on bicarbonate infusion. Sco2 remains low at 14 today AM. Pt was started on IV antibiotics and antifungal. ID following.    Pt. was seen and examined today, weak and tired appearing. Scr worsened to 3.34 on AM labs and nonoliguric with UOP of 120cc as documented.     PAST HISTORY  --------------------------------------------------------------------------------  No significant changes to PMH, PSH, FHx, SHx, unless otherwise noted    ALLERGIES & MEDICATIONS  --------------------------------------------------------------------------------  Allergies    No Known Allergies    Intolerances      Standing Inpatient Medications  carvedilol 25 milliGRAM(s) Oral every 12 hours  chlorhexidine 2% Cloths 1 Application(s) Topical <User Schedule>  doxazosin 4 milliGRAM(s) Oral <User Schedule>  enoxaparin Injectable 60 milliGRAM(s) SubCutaneous <User Schedule>  epoetin cortney-epbx (RETACRIT) Injectable 99487 Unit(s) SubCutaneous every 7 days  finasteride 5 milliGRAM(s) Oral daily  fluconAZOLE IVPB 400 milliGRAM(s) IV Intermittent every 24 hours  insulin glargine Injectable (LANTUS) 5 Unit(s) SubCutaneous at bedtime  insulin lispro (ADMELOG) corrective regimen sliding scale   SubCutaneous three times a day with meals  insulin lispro (ADMELOG) corrective regimen sliding scale   SubCutaneous at bedtime  insulin lispro Injectable (ADMELOG) 4 Unit(s) SubCutaneous three times a day before meals  levothyroxine 50 MICROGram(s) Oral daily  melatonin 6 milliGRAM(s) Oral at bedtime  meropenem  IVPB      meropenem  IVPB 1000 milliGRAM(s) IV Intermittent every 12 hours  NIFEdipine XL 60 milliGRAM(s) Oral daily  pantoprazole    Tablet 40 milliGRAM(s) Oral daily  phenytoin   Capsule 100 milliGRAM(s) Oral every 8 hours  polyethylene glycol 3350 17 Gram(s) Oral daily  predniSONE   Tablet 5 milliGRAM(s) Oral daily  senna 2 Tablet(s) Oral at bedtime  sodium bicarbonate 1300 milliGRAM(s) Oral <User Schedule>  sodium bicarbonate  Infusion 0.243 mEq/kG/Hr IV Continuous <Continuous>    PRN Inpatient Medications      REVIEW OF SYSTEMS  --------------------------------------------------------------------------------  Gen: fevers/chills+  Head/Eyes/Ears: No HA   Respiratory: No dyspnea, cough  CV: No chest pain  GI: No abdominal pain, diarrhea  : as per HPI  MSK: No edema  Skin: No rashes  Heme: as per HPI    All other systems were reviewed and are negative, except as noted.    VITALS/PHYSICAL EXAM  --------------------------------------------------------------------------------  T(C): 38.4 (07-27-22 @ 07:00), Max: 38.4 (07-27-22 @ 07:00)  HR: 63 (07-27-22 @ 09:00) (53 - 63)  BP: 160/70 (07-27-22 @ 09:00) (94/55 - 169/72)  RR: 21 (07-27-22 @ 09:00) (17 - 29)  SpO2: 97% (07-27-22 @ 09:00) (94% - 100%)  Wt(kg): --      07-26-22 @ 07:01  -  07-27-22 @ 07:00  --------------------------------------------------------  IN: 2500 mL / OUT: 120 mL / NET: 2380 mL    07-27-22 @ 07:01  -  07-27-22 @ 09:54  --------------------------------------------------------  IN: 400 mL / OUT: 20 mL / NET: 380 mL      Physical Exam:  	Gen: NAD  	HEENT: Anicteric  	Pulm: CTA B/L  	CV: S1S2+  	Abd: Soft, +BS               Transplant site: RLQ non tender, well healed surgical scar+  	Ext: No LE edema B/L  	Neuro: Awake and somnolent  	Skin: Warm and dry      LABS/STUDIES  --------------------------------------------------------------------------------              7.7    3.06  >-----------<  346      [07-26-22 @ 21:51]              23.4     131  |  99  |  102  ----------------------------<  121      [07-26-22 @ 21:51]  5.2   |  14  |  3.34        Ca     8.6     [07-26-22 @ 21:51]      Mg     2.1     [07-26-22 @ 21:51]      Phos  5.7     [07-26-22 @ 21:51]    Creatinine Trend:  SCr 3.34 [07-26 @ 21:51]  SCr 3.17 [07-26 @ 14:45]  SCr 2.67 [07-25 @ 22:48]  SCr 2.45 [07-25 @ 18:28]  SCr 2.22 [07-25 @ 11:51]    Urinalysis - [07-25-22 @ 18:28]      Color Yellow / Appearance Slightly Turbid / SG 1.020 / pH 5.5      Gluc Negative / Ketone Negative  / Bili Negative / Urobili Negative       Blood Small / Protein 100 / Leuk Est Large / Nitrite Negative      RBC 2 / WBC 40 / Hyaline 5 / Gran  / Sq Epi  / Non Sq Epi 0 / Bacteria Many    Urine Creatinine 129      [07-26-22 @ 04:17]  Urine Sodium 20      [07-26-22 @ 04:17]  Urine Osmolality 387      [07-26-22 @ 04:17]    HBsAg Nonreact      [07-25-22 @ 11:51]  HCV 0.18, Nonreact      [07-25-22 @ 11:51]    CMV PCRCMVPCR Log: NotDetec Lkk87TE/mL (07-22 @ 06:18)  CMVPCR Log: NotDetec Omh19KY/mL (06-26 @ 18:14)  CMVPCR Log: NotDetec Mcw11EO/mL (06-25 @ 14:08)    Parvo PCRParvovirus B19 DNA by PCR: NotDetec IU/mL (06-26 @

## 2022-07-27 NOTE — PROGRESS NOTE ADULT - ASSESSMENT
66 yr old man with h/o ESRD due to DM on HD since 2019, s/p DDRT from [facility] donor on 4/21/22 complicated by DGF requiring HD until 4/29/22 now admitted with seizures/status epilepticus    1. S/p [facility] DDRT on 4/21/22  - creatinine slowly uptrending to 1.5. Electrolytes and volume status fair. Transplant ureteric stent removed 7/12. Renal sonogram done on 7/19 showed no hydronephrosis. MARA was not detected on US. DSA sent on 7/19/22 were negative. Pt received IVF (500 cc NS) on 7/20/22. SCr improved initially now again uptrending to 2.13 today. Pt with fevers and worsening renal function. CT /p negative for any collections/abscess. No hydronephrosis noted on renal US. UA done on 7/25/22 showed LE and pyuria.   Follow BK PCR and adenovirus. Follow urine and blood cultures. follow fungal cultures. Continue IV antibiotics. transplant ID following. Pt with worsening acidosis and oliguric now. SCr worsened to 3.34 today and UOp of 120cc in last 24 hours. Plan for IR guided nephrostomy placement. Plan for HD today. HD consent obtained and placed in chart.  Monitor for fevers/labs.    Transaminitis: Pt with elevated LFT, now improving. Lipitor and Bactrim discontinued. Hepatology following. Monitor LFTs.    2. Immunosuppression - Envarsus discontinued for possible neurotoxicity, seizures, PRES. Continue Belatacept, last dose on 7/18. Next dose on 8/1/22. (hold for now)   Reduced MMF to 250mgs BID for progressive leukopenia.      Continue Prednisone 5mg po daily      Infection prophylaxis - bactrim ss daily discontinued due to elevated LFTs.   continue holding valcyte 450mg daily (CMV intermediate risk) for now given leucopenia, CMV pcr negative.     3. Seizure d/o -admitted with status epilepticus .Corrected phenytoin level 3.7 (7/21). As per neurology to continue current dose of phenytoin.  Dose being adjusted as per levels. Neurology following.      4. HTN:  On Nifedipine 60mg po bid, Doxazosin 4mg PO BID. Continue Coreg 25 mg BID. Monitor BP. Hydralazine discontinued given worsening renal function and concerns for hydralazine induced renal disfunction.      5. BPH with urine retention - continue Proscar and doxazosin. Recent bladder scan shows minimal PVR. Monitor UOP.    6. Cytopenia -  likely med induced.  Cont epo. s/p Filgrastim on 7/23/22. Epogen 4K with HD today. Monitor blood counts.    7. Atrial fibrillation - was on Eliquis, now on therapeutic Lovenox due to drug interaction with fosphenytoin. On Lovenox for AC. Lovenox dose now adjusted to daily dosing due to decreased egfr. Now with worsening renal function. Will switch to Heparin infusion.     8. ESBL Klebsiella UTI - Completed ertapenem x 3 days total per transplant ID. UA positive for infection. Continue IV antibiotics. ID following.     9. Hyponatremia: SNa slowly downtrending to 129 on 7/18. Urine studies reviewed. SNa stable at 131 today. Fluid restriction 1L. Monitor SNa. Avoid hypotonic fluids.     10. Anemia: Pt with low hb. Pt on AC. Transfuse 1 unit today. Continue epogen. Monitor H/H.

## 2022-07-27 NOTE — PROGRESS NOTE ADULT - SUBJECTIVE AND OBJECTIVE BOX
[facility] [place] [facility]   MD NINA MASON MD, DO SADIE RAMIREZ NP    TEL:  OFFICE: [phone]    From 5pm-7am Answering Service [phone]    -- RENAL FOLLOW UP NOTE ---Date of Service 07-27-22 @ 14:43    Patient is a 66y old  Male who presents with a chief complaint of Status Epilepticus (27 Jul 2022 13:19)      Patient seen and examined in ICU.       VITALS:  T(F): 100.2 (07-27-22 @ 11:00), Max: 101.1 (07-27-22 @ 07:00)  HR: 53 (07-27-22 @ 14:00)  BP: 110/54 (07-27-22 @ 14:00)  RR: 22 (07-27-22 @ 14:00)  SpO2: 94% (07-27-22 @ 14:00)  Wt(kg): --    07-26 @ 07:01  -  07-27 @ 07:00  --------------------------------------------------------  IN: 2500 mL / OUT: 120 mL / NET: 2380 mL    07-27 @ 07:01  -  07-27 @ 14:43  --------------------------------------------------------  IN: 600 mL / OUT: 55 mL / NET: 545 mL          PHYSICAL EXAM:  Constitutional: NAD  Neck: No JVD  Respiratory: CTAB, no wheezes, rales or rhonchi  Cardiovascular: S1, S2, RRR  Gastrointestinal: BS+, soft, NT/ND  Extremities: No peripheral edema    Hospital Medications:   MEDICATIONS  (STANDING):  carvedilol 25 milliGRAM(s) Oral every 12 hours  chlorhexidine 2% Cloths 1 Application(s) Topical <User Schedule>  doxazosin 4 milliGRAM(s) Oral <User Schedule>  epoetin cortney-epbx (RETACRIT) Injectable 48581 Unit(s) SubCutaneous every 7 days  filgrastim-sndz (ZARXIO) Injectable 300 MICROGram(s) SubCutaneous once  finasteride 5 milliGRAM(s) Oral daily  fluconAZOLE IVPB 400 milliGRAM(s) IV Intermittent every 24 hours  insulin glargine Injectable (LANTUS) 5 Unit(s) SubCutaneous at bedtime  insulin lispro (ADMELOG) corrective regimen sliding scale   SubCutaneous three times a day with meals  insulin lispro (ADMELOG) corrective regimen sliding scale   SubCutaneous at bedtime  insulin lispro Injectable (ADMELOG) 4 Unit(s) SubCutaneous three times a day before meals  levothyroxine 50 MICROGram(s) Oral daily  melatonin 6 milliGRAM(s) Oral at bedtime  meropenem  IVPB      meropenem  IVPB 1000 milliGRAM(s) IV Intermittent every 12 hours  NIFEdipine XL 60 milliGRAM(s) Oral daily  pantoprazole    Tablet 40 milliGRAM(s) Oral daily  phenytoin   Capsule 100 milliGRAM(s) Oral every 8 hours  polyethylene glycol 3350 17 Gram(s) Oral daily  predniSONE   Tablet 5 milliGRAM(s) Oral daily  senna 2 Tablet(s) Oral at bedtime  sodium bicarbonate 1300 milliGRAM(s) Oral <User Schedule>      LABS:  07-27    128<L>  |  95<L>  |  104<H>  ----------------------------<  152<H>  5.2   |  16<L>  |  3.77<H>    Ca    8.1<L>      27 Jul 2022 13:00  Phos  5.7     07-26  Mg     2.1     07-26    TPro  6.2  /  Alb  2.5<L>  /  TBili  0.4  /  DBili  0.2  /  AST  136<H>  /  ALT  293<H>  /  AlkPhos  301<H>  07-27    Creatinine Trend: 3.77 <--, 3.34 <--, 3.17 <--, 2.67 <--, 2.45 <--, 2.22 <--, 2.13 <--, 1.79 <--, 1.80 <--, 1.50 <--, 1.43 <--, 1.37 <--    Albumin, Serum: 2.5 g/dL (07-27 @ 13:00)  Albumin, Serum: 2.6 g/dL (07-26 @ 21:51)  Phosphorus Level, Serum: 5.7 mg/dL (07-26 @ 21:51)  Albumin, Serum: 2.5 g/dL (07-26 @ 14:45)  Phosphorus Level, Serum: 5.5 mg/dL (07-26 @ 14:45)                              7.9    2.97  )-----------( 315      ( 27 Jul 2022 13:00 )             23.7     Urine Studies:  Urinalysis - [07-25-22 @ 18:28]      Color Yellow / Appearance Slightly Turbid / SG 1.020 / pH 5.5      Gluc Negative / Ketone Negative  / Bili Negative / Urobili Negative       Blood Small / Protein 100 / Leuk Est Large / Nitrite Negative      RBC 2 / WBC 40 / Hyaline 5 / Gran  / Sq Epi  / Non Sq Epi 0 / Bacteria Many    Urine Creatinine 129      [07-26-22 @ 04:17]  Urine Sodium 20      [07-26-22 @ 04:17]  Urine Osmolality 387      [07-26-22 @ 04:17]    Iron 17, TIBC 171, %sat 10      [05-02-22 @ 13:03]  Ferritin 1505      [05-02-22 @ 13:05]  PTH -- (Ca 9.2)      [02-05-22 @ 10:13]   294  HbA1c 6.0      [02-21-20 @ 08:53]  TSH 4.69      [07-06-22 @ 18:36]    HBsAg Nonreact      [07-25-22 @ 11:51]  HCV 0.18, Nonreact      [07-25-22 @ 11:51]      RADIOLOGY & ADDITIONAL STUDIES:

## 2022-07-27 NOTE — PROGRESS NOTE ADULT - TIME BILLING
Kidney Transplant recipient with ANA, up trending creatinine, metabolic acidosis and Oliguria  Fever under evaluation, current major consideration is infectious etiology likely from urinary tract  Abnormal LFT improving , Anemia s/p PRBC  Creatinine trend noted  Comorbidities reviewed.  Patient seen, examined and reviewed available clinical and lab data and imaging data  Reviewed immunosuppression and allograft function including urine out put, creatinine trend, urine studies and imaging.  On minimized regimen.  Reviewed medication regimen including antibiotic, antifungal for comorbidities  Suggestions:  1. f/u Blood culture, urine culture noted  2. Abdominal imaging- Non contrast CT,   sonogram noted  3. Reviewed possibility of nephrostomy with I/R by transplant surgeon today ; in view of undilated transplant kidney pelvicaliceal system this is at present being deferred. Will re image if clinically not improving and will reconsider this option; anticoagulation is on hold.  4. On hold are potential hepatotoxic medications   5. I/J dialysis catheter being placed by I/R today  Will dialyze once vascular access is available in view of ANA with acidosis and Oliguria  I have seen the patient and reviewed dialysis prescription. Dialysis prescription has been adjusted for optimized control of volume status, uremia and electrolytes. Management of additional metabolic abnormalities/anemia will continue to be addressed on follow up.  Will follow  I was present during and reviewed clinical and lab data as well as assessment and plan as documented by the house staff as noted. Please contact if any additional questions with any change in clinical condition or on availability of any additional information or reports.

## 2022-07-27 NOTE — PROGRESS NOTE ADULT - SUBJECTIVE AND OBJECTIVE BOX
Follow Up:      Interval History/ROS:Patient is a 65 yo Male who presents with a chief complaint of Status Epilepticus    Pt seen and examined at bedside. Vitals stable when seen. Continues to be febrile. T max 101.1 this AM. Worsening renal function, acidosis, oliguric - plan for nephrostomy by IR for concern for obstruction along with temporary HD.    REVIEW OF SYSTEMS  As noted in HPI    Allergies  No Known Allergies    ANTIMICROBIALS:    fluconAZOLE IVPB 400 every 24 hours  meropenem  IVPB    meropenem  IVPB 1000 every 12 hours    OTHER MEDS: MEDICATIONS  (STANDING):  carvedilol 25 every 12 hours  desmopressin IVPB 20 once  doxazosin 4 <User Schedule>  epoetin cortney-epbx (RETACRIT) Injectable 45899 every 7 days  filgrastim-sndz (ZARXIO) Injectable 300 once  finasteride 5 daily  insulin glargine Injectable (LANTUS) 5 at bedtime  insulin lispro (ADMELOG) corrective regimen sliding scale  three times a day with meals  insulin lispro (ADMELOG) corrective regimen sliding scale  at bedtime  insulin lispro Injectable (ADMELOG) 4 three times a day before meals  levothyroxine 50 daily  melatonin 6 at bedtime  NIFEdipine XL 60 daily  pantoprazole    Tablet 40 daily  phenytoin   Capsule 100 every 8 hours  polyethylene glycol 3350 17 daily  predniSONE   Tablet 5 daily  senna 2 at bedtime    Vital Signs Last 24 Hrs  T(F): 100.2 (22 @ 11:00), Max: 101.8 (22 @ 03:00)    Vital Signs Last 24 Hrs  HR: 57 (22 @ 11:00) (53 - 63)  BP: 159/73 (22 @ 11:00) (94/55 - 169/72)  RR: 19 (22 @ 11:00)  SpO2: 98% (22 @ 11:00) (94% - 100%)  Wt(kg): --    EXAM:  General: Alert and Awake, NAD  Cardiac: RRR, No M/R/G  Resp: CTAB, No Wh/Rh/Ra  Abdomen: NBS, NT/ND, No HSM, No rigidity or guarding  MSK: No LE edema. No Calf tenderness  : [place]an[place]  Skin: No rashes or lesions. Skin is warm and dry to the touch.   Neuro: Alert and Awake. CN 2-12 Grossly intact. Moves all four extremities spontaneously.  Psych: Calm, Pleasant, Cooperative    Labs:                        7.7    3.06  )-----------( 346      ( 2022 21:51 )             23.4         131<L>  |  99  |  102<H>  ----------------------------<  121<H>  5.2   |  14<L>  |  3.34<H>    Ca    8.6      2022 21:51  Phos  5.7       Mg     2.1         TPro  6.4  /  Alb  2.6<L>  /  TBili  0.4  /  DBili  x   /  AST  144<H>  /  ALT  336<H>  /  AlkPhos  275<H>      WBC Trend:  WBC Count: 3.06 (22 @ 21:51)  WBC Count: 3.49 (22 @ 14:45)  WBC Count: 3.72 (22 @ 22:48)  WBC Count: 4.52 (22 @ 18:28)    Creatine Trend:  Creatinine, Serum: 3.34 ()  Creatinine, Serum: 3.17 ()  Creatinine, Serum: 2.67 ()  Creatinine, Serum: 2.45 ()    Liver Biochemical Testing Trend:  Alanine Aminotransferase (ALT/SGPT): 336 *H* ()  Alanine Aminotransferase (ALT/SGPT): 364 *H* ()  Alanine Aminotransferase (ALT/SGPT): 520 *H* ()  Alanine Aminotransferase (ALT/SGPT): 562 *H* ()  Alanine Aminotransferase (ALT/SGPT): 580 *H* ()  Aspartate Aminotransferase (AST/SGOT): 144 (22 @ 21:51)  Aspartate Aminotransferase (AST/SGOT): 167 (22 @ 14:45)  Aspartate Aminotransferase (AST/SGOT): 358 (22 @ 22:48)  Aspartate Aminotransferase (AST/SGOT): 457 (22 @ 18:28)  Aspartate Aminotransferase (AST/SGOT): 555 (22 @ 11:51)  Bilirubin Total, Serum: 0.4 (-)  Bilirubin Total, Serum: 0.4 (-26)  Bilirubin Total, Serum: 0.7 (-25)  Bilirubin Total, Serum: 0.7 (07-25)  Bilirubin Total, Serum: 0.3 (07-25)    Trend LDH  22 @ 06:30  655<H>  22 @ 18:47  223  22 @ 10:05  246<H>  22 @ 07:15  260<H>  22 @ 18:58  179    Urinalysis Basic - ( 2022 18:28 )  Color: Yellow / Appearance: Slightly Turbid / S.020 / pH: x  Gluc: x / Ketone: Negative  / Bili: Negative / Urobili: Negative   Blood: x / Protein: 100 / Nitrite: Negative   Leuk Esterase: Large / RBC: 2 /hpf / WBC 40 /HPF   Sq Epi: x / Non Sq Epi: 0 /hpf / Bacteria: Many    MICROBIOLOGY:  MRSA PCR Result.: NotDetec (22 @ 03:47)  MRSA PCR Result.: NotDetec (22 @ 21:02)  MRSA PCR Result.: NotDetec (22 @ 11:25)    Culture - Urine (collected 2022 18:27)  Source: Catheterized Catheterized  Preliminary Report:    50,000 - 99,000 CFU/mL Gram Negative Rods    Culture - Blood (collected 2022 14:28)  Source: .Blood Blood  Preliminary Report:    No growth to date.    Culture - Blood (collected 2022 11:59)  Source: .Blood Blood  Preliminary Report:    No growth to date.    Culture - Blood (collected 15 Jul 2022 08:36)  Source: .Blood Blood  Final Report:    No Growth Final    Culture - Blood (collected 15 Jul 2022 07:15)  Source: .Blood Blood  Final Report:    No Growth Final    Culture - Urine (collected 2022 00:58)  Source: Clean Catch Clean Catch (Midstream)  Final Report:    50,000 - 99,000 CFU/mL Klebsiella pneumoniae ESBL  Organism: Klebsiella pneumoniae ESBL  Organism: Klebsiella pneumoniae ESBL    Sensitivities:      -  Amikacin: S <=16      -  Amoxicillin/Clavulanic Acid: S <=8/4 Consider reserving for cystitis when ampicillin/sulbactam is resistant      -  Ampicillin: R >16 These ampicillin results predict results for amoxicillin      -  Ampicillin/Sulbactam: R >16/8 Enterobacter, Klebsiella aerogenes, Citrobacter, and Serratia may develop resistance during prolonged therapy (3-4 days)      -  Aztreonam: R >16      -  Cefazolin: R >16 (MIC_CL_COM_ENTERIC_CEFAZU) For uncomplicated UTI with K. pneumoniae, E. coli, or P. mirablis: TALITA <=16 is sensitive and TALITA >=32 is resistant. This also predicts results for oral agents cefaclor, cefdinir, cefpodoxime, cefprozil, cefuroxime axetil, cephalexin and locarbef for uncomplicated UTI. Note that some isolates may be susceptible to these agents while testing resistant to cefazolin.      -  Cefepime: R >16      -  Ceftriaxone: R >32 Enterobacter, Klebsiella aerogenes, Citrobacter, and Serratia may develop resistance during prolonged therapy      -  Ciprofloxacin: R 1      -  Ertapenem: S <=0.5      -  Gentamicin: R >8      -  Imipenem: S <=1      -  Levofloxacin: S <=0.5      -  Meropenem: S <=1      -  Nitrofurantoin: R >64 Should not be used to treat pyelonephritis      -  Piperacillin/Tazobactam: S <=8      -  Tigecycline: S <=2      -  Tobramycin: I 8      -  Trimethoprim/Sulfamethoxazole: R >2/38      Method Type: TALITA    Culture - Fungal, CSF (collected 2022 13:09)  Source: .CSF CSF  Preliminary Report:    No growth    Culture - CSF with Gram Stain (collected 2022 13:09)  Source: .CSF CSF  Final Report:    No growth    Culture - Acid Fast - CSF (collected 2022 13:09)  Source: .CSF CSF  Preliminary Report:    No growth at 1 week.    Culture - Nocardia (collected 2022 05:06)  Source: .Other Other  Final Report:    No Nocardia isolated    HIV-1/2 Combo Result: Nonreact (22 @ 14:47)  HIV-1/2 Combo Result: Nonreact (20 @ 19:26)    Cytomegalovirus By PCR:   NotDetec (22 @ 06:18)    Fungitell: <31 pg/mL ( @ 18:32)  Rapid RVP Result: NotDetec ( @ 15:15)    COVID-19 PCR: NotDetec (22 @ 11:51)  COVID-19 PCR: NotDetec (22 @ 07:55)  COVID-19 PCR: NotDetec (22 @ 07:54)  COVID-19 PCR: NotDetec (07-10-22 @ 16:11)  COVID-19 PCR: NotDetec (22 @ 06:03)  COVID-19 PCR: NotDetec (22 @ 07:01)  COVID-19 PCR: NotDetec (22 @ 12:44)    Procalcitonin, Serum: 1.67 ()  Procalcitonin, Serum: 0.88 ()    Lactate Dehydrogenase, Serum: 655 ()    Blood Gas Arterial, Lactate: 1.5 ( @ 19:00)  Blood Gas Venous - Lactate: 1.4 ( @ 18:07)    RADIOLOGY:  imaging below personally reviewed    < from: CT Abdomen and Pelvis No Cont (22 @ 17:16) >  IMPRESSION:  Limited study to evaluate for infectionwithout intravenous contrast.    Small perinephric fluid collection. No hydronephrosis.    Small volume ascites.    --- End of Report ---    < end of copied text >    < from: US Trans Kidney w/ Doppler, Right (22 @ 10:35) >  IMPRESSION:  *  No evidence of a significant renal artery stenosis.  *  Persistent mild elevation of the resistive indices.  *  Trace peritransplant free fluid.      --- End of Report ---    < end of copied text > Follow Up:      Interval History/ROS:Patient is a 65 yo Male who presents with a chief complaint of Status Epilepticus    Pt seen and examined at bedside. Vitals stable when seen. Continues to be febrile. T max 101.1 this AM. Worsening renal function, acidosis, oliguric - plan for potential nephrostomy by IR for concern for obstruction along with temporary HD.    REVIEW OF SYSTEMS  As noted in HPI    Allergies  No Known Allergies    ANTIMICROBIALS:    fluconAZOLE IVPB 400 every 24 hours  meropenem  IVPB    meropenem  IVPB 1000 every 12 hours    OTHER MEDS: MEDICATIONS  (STANDING):  carvedilol 25 every 12 hours  desmopressin IVPB 20 once  doxazosin 4 <User Schedule>  epoetin cortney-epbx (RETACRIT) Injectable 75210 every 7 days  filgrastim-sndz (ZARXIO) Injectable 300 once  finasteride 5 daily  insulin glargine Injectable (LANTUS) 5 at bedtime  insulin lispro (ADMELOG) corrective regimen sliding scale  three times a day with meals  insulin lispro (ADMELOG) corrective regimen sliding scale  at bedtime  insulin lispro Injectable (ADMELOG) 4 three times a day before meals  levothyroxine 50 daily  melatonin 6 at bedtime  NIFEdipine XL 60 daily  pantoprazole    Tablet 40 daily  phenytoin   Capsule 100 every 8 hours  polyethylene glycol 3350 17 daily  predniSONE   Tablet 5 daily  senna 2 at bedtime    Vital Signs Last 24 Hrs  T(F): 100.2 (22 @ 11:00), Max: 101.8 (22 @ 03:00)    Vital Signs Last 24 Hrs  HR: 57 (22 @ 11:00) (53 - 63)  BP: 159/73 (22 @ 11:00) (94/55 - 169/72)  RR: 19 (22 @ 11:00)  SpO2: 98% (22 @ 11:00) (94% - 100%)  Wt(kg): --    EXAM:  General: Alert and Awake, NAD  Cardiac: RRR, No M/R/G  Resp: CTAB, No Wh/Rh/Ra  Abdomen: NBS, NT/ND, No HSM, No rigidity or guarding  MSK: No LE edema. No Calf tenderness  : Miranda  Skin: No rashes or lesions. Skin is warm and dry to the touch.   Neuro: Alert and Awake. CN 2-12 Grossly intact. Moves all four extremities spontaneously.  Psych: Calm, Pleasant, Cooperative    Labs:                        7.7    3.06  )-----------( 346      ( 2022 21:51 )             23.4         131<L>  |  99  |  102<H>  ----------------------------<  121<H>  5.2   |  14<L>  |  3.34<H>    Ca    8.6      2022 21:51  Phos  5.7       Mg     2.1         TPro  6.4  /  Alb  2.6<L>  /  TBili  0.4  /  DBili  x   /  AST  144<H>  /  ALT  336<H>  /  AlkPhos  275<H>      WBC Trend:  WBC Count: 3.06 (22 @ 21:51)  WBC Count: 3.49 (22 @ 14:45)  WBC Count: 3.72 (22 @ 22:48)  WBC Count: 4.52 (22 @ 18:28)    Creatine Trend:  Creatinine, Serum: 3.34 ()  Creatinine, Serum: 3.17 ()  Creatinine, Serum: 2.67 ()  Creatinine, Serum: 2.45 ()    Liver Biochemical Testing Trend:  Alanine Aminotransferase (ALT/SGPT): 336 *H* ()  Alanine Aminotransferase (ALT/SGPT): 364 *H* ()  Alanine Aminotransferase (ALT/SGPT): 520 *H* ()  Alanine Aminotransferase (ALT/SGPT): 562 *H* ()  Alanine Aminotransferase (ALT/SGPT): 580 *H* ()  Aspartate Aminotransferase (AST/SGOT): 144 (22 @ 21:51)  Aspartate Aminotransferase (AST/SGOT): 167 (22 @ 14:45)  Aspartate Aminotransferase (AST/SGOT): 358 (22 @ 22:48)  Aspartate Aminotransferase (AST/SGOT): 457 (22 @ 18:28)  Aspartate Aminotransferase (AST/SGOT): 555 (22 @ 11:51)  Bilirubin Total, Serum: 0.4 (-26)  Bilirubin Total, Serum: 0.4 (-26)  Bilirubin Total, Serum: 0.7 (-25)  Bilirubin Total, Serum: 0.7 (07-25)  Bilirubin Total, Serum: 0.3 (07-25)    Trend LDH  22 @ 06:30  655<H>  22 @ 18:47  223  22 @ 10:05  246<H>  22 @ 07:15  260<H>  22 @ 18:58  179    Urinalysis Basic - ( 2022 18:28 )  Color: Yellow / Appearance: Slightly Turbid / S.020 / pH: x  Gluc: x / Ketone: Negative  / Bili: Negative / Urobili: Negative   Blood: x / Protein: 100 / Nitrite: Negative   Leuk Esterase: Large / RBC: 2 /hpf / WBC 40 /HPF   Sq Epi: x / Non Sq Epi: 0 /hpf / Bacteria: Many    MICROBIOLOGY:  MRSA PCR Result.: NotDetec (22 @ 03:47)  MRSA PCR Result.: NotDetec (22 @ 21:02)  MRSA PCR Result.: NotDetec (22 @ 11:25)    Culture - Urine (collected 2022 18:27)  Source: Catheterized Catheterized  Preliminary Report:    50,000 - 99,000 CFU/mL Gram Negative Rods    Culture - Blood (collected 2022 14:28)  Source: .Blood Blood  Preliminary Report:    No growth to date.    Culture - Blood (collected 2022 11:59)  Source: .Blood Blood  Preliminary Report:    No growth to date.    Culture - Blood (collected 15 Jul 2022 08:36)  Source: .Blood Blood  Final Report:    No Growth Final    Culture - Blood (collected 15 Jul 2022 07:15)  Source: .Blood Blood  Final Report:    No Growth Final    Culture - Urine (collected 2022 00:58)  Source: Clean Catch Clean Catch (Midstream)  Final Report:    50,000 - 99,000 CFU/mL Klebsiella pneumoniae ESBL  Organism: Klebsiella pneumoniae ESBL  Organism: Klebsiella pneumoniae ESBL    Sensitivities:      -  Amikacin: S <=16      -  Amoxicillin/Clavulanic Acid: S <=8/4 Consider reserving for cystitis when ampicillin/sulbactam is resistant      -  Ampicillin: R >16 These ampicillin results predict results for amoxicillin      -  Ampicillin/Sulbactam: R >16/8 Enterobacter, Klebsiella aerogenes, Citrobacter, and Serratia may develop resistance during prolonged therapy (3-4 days)      -  Aztreonam: R >16      -  Cefazolin: R >16 (MIC_CL_COM_ENTERIC_CEFAZU) For uncomplicated UTI with K. pneumoniae, E. coli, or P. mirablis: TALITA <=16 is sensitive and TALITA >=32 is resistant. This also predicts results for oral agents cefaclor, cefdinir, cefpodoxime, cefprozil, cefuroxime axetil, cephalexin and locarbef for uncomplicated UTI. Note that some isolates may be susceptible to these agents while testing resistant to cefazolin.      -  Cefepime: R >16      -  Ceftriaxone: R >32 Enterobacter, Klebsiella aerogenes, Citrobacter, and Serratia may develop resistance during prolonged therapy      -  Ciprofloxacin: R 1      -  Ertapenem: S <=0.5      -  Gentamicin: R >8      -  Imipenem: S <=1      -  Levofloxacin: S <=0.5      -  Meropenem: S <=1      -  Nitrofurantoin: R >64 Should not be used to treat pyelonephritis      -  Piperacillin/Tazobactam: S <=8      -  Tigecycline: S <=2      -  Tobramycin: I 8      -  Trimethoprim/Sulfamethoxazole: R >2/38      Method Type: TALITA    Culture - Fungal, CSF (collected 2022 13:09)  Source: .CSF CSF  Preliminary Report:    No growth    Culture - CSF with Gram Stain (collected 2022 13:09)  Source: .CSF CSF  Final Report:    No growth    Culture - Acid Fast - CSF (collected 2022 13:09)  Source: .CSF CSF  Preliminary Report:    No growth at 1 week.    Culture - Nocardia (collected 2022 05:06)  Source: .Other Other  Final Report:    No Nocardia isolated    HIV-1/2 Combo Result: Nonreact (22 @ 14:47)  HIV-1/2 Combo Result: Nonreact (20 @ 19:26)    Cytomegalovirus By PCR:   NotDetec (22 @ 06:18)    Fungitell: <31 pg/mL ( @ 18:32)  Rapid RVP Result: NotDetec ( @ 15:15)    COVID-19 PCR: NotDetec (22 @ 11:51)  COVID-19 PCR: NotDetec (22 @ 07:55)  COVID-19 PCR: NotDetec (22 @ 07:54)  COVID-19 PCR: NotDetec (07-10-22 @ 16:11)  COVID-19 PCR: NotDetec (22 @ 06:03)  COVID-19 PCR: NotDetec (22 @ 07:01)  COVID-19 PCR: NotDetec (22 @ 12:44)    Procalcitonin, Serum: 1.67 ()  Procalcitonin, Serum: 0.88 ()    Lactate Dehydrogenase, Serum: 655 ()    Blood Gas Arterial, Lactate: 1.5 ( @ 19:00)  Blood Gas Venous - Lactate: 1.4 ( @ 18:07)    RADIOLOGY:  imaging below personally reviewed    < from: CT Abdomen and Pelvis No Cont (22 @ 17:16) >  IMPRESSION:  Limited study to evaluate for infectionwithout intravenous contrast.    Small perinephric fluid collection. No hydronephrosis.    Small volume ascites.    --- End of Report ---    < end of copied text >    < from: US Trans Kidney w/ Doppler, Right (22 @ 10:35) >  IMPRESSION:  *  No evidence of a significant renal artery stenosis.  *  Persistent mild elevation of the resistive indices.  *  Trace peritransplant free fluid.      --- End of Report ---    < end of copied text >

## 2022-07-27 NOTE — PROGRESS NOTE ADULT - ATTENDING COMMENTS
Patient seen and examined with Dr Zamudio    I agree with his interval history exam and plans as noted above  Patient with improving mental status  acidosis, abnormal UA and 50-99k klebsiella- ESBL type  induration in the transplant region    remains on Meropenem/fluconazole  follow up cultures  follow EBV viral load, CMv viral load, adenovirus viral load, BK virus   imaging with ultrasound over renal transplant site reveals findings below and fluid was felt to be too small to drain per discussion with transplant surgeons    < from: US Trans Kidney w/ Doppler, Right (07.27.22 @ 11:39) >    IMPRESSION:    Mild urothelial thickening of collecting system and ureter.  There are slightly increased resistive indices with rounded upstroke   waveform, since the prior exam. Renal vein is patent.  Small amount of free fluid in right lower quadrant and around kidney.    < end of copied text >      Paddy Mcmanus MD  Can be called via Teams  After 5pm/weekends [phone]

## 2022-07-27 NOTE — PROGRESS NOTE ADULT - NSPROGADDITIONALINFOA_GEN_ALL_CORE
-Plan discussed with pt/team.  Contact info: [phone] (24/7). pager [phone]  [email or web address] password [email or web address]  Spent 26 minutes assessing pt/labs/meds and discussing plan of care with primary team  Adjusting insulin  Discharge plan  Follow up care

## 2022-07-27 NOTE — PROGRESS NOTE ADULT - NUTRITIONAL ASSESSMENT
This patient has been assessed with a concern for Malnutrition and has been determined to have a diagnosis/diagnoses of Severe protein-calorie malnutrition.    This patient is being managed with:   Diet NPO-  NPO for Procedure/Test     NPO Start Date: 26-Jul-2022   NPO Start Time: 23:59  Except Medications  Entered: Jul 27 2022 12:15AM    Diet Consistent Carbohydrate w/Evening Snack-  1200mL Fluid Restriction ([id number])  Supplement Feeding Modality:  Oral  Nepro Cans or Servings Per Day:  2       Frequency:  Daily  Entered: Jul 25 2022 10:54AM

## 2022-07-27 NOTE — PROGRESS NOTE ADULT - NUTRITIONAL ASSESSMENT
This patient has been assessed with a concern for Malnutrition and has been determined to have a diagnosis/diagnoses of Severe protein-calorie malnutrition.    This patient is being managed with:   Diet Consistent Carbohydrate w/Evening Snack-  1200mL Fluid Restriction ([id number])  Supplement Feeding Modality:  Oral  Nepro Cans or Servings Per Day:  2       Frequency:  Daily  Entered: Jul 25 2022 10:54AM

## 2022-07-27 NOTE — PROGRESS NOTE ADULT - ASSESSMENT
66M with h/o DM type II, HTN, CAD s/p CABG in 2020, Afib on Eliquis, CVA in 2019 due to Afib, Seizure d/o (last episode was on 4/8/22), h/o GI bleed in 2/2022 EGD with duodenal ulcer and ESRD on HD s/p R DDRT from DCD donor on 4/21/22 complicated by DGF requiring HD until 4/29/22 now with good graft function who presented with status epilepticus in setting of UTI/antibiotics and sub-therapeutic valproic acid level         Acute Transaminitis   - Hepatology consulted   - hepatotoxic medications held  - RUQ abd- Abdominal doppler - no PVT   - CT a/p non contrast- small perinephric collection   - Labs: Hepatitis panel, BK PCR, EBV PCR, CMV with PCR   - Infectious w/u: bld/urine cxs- sent f/u final results    - c/w  meropenem   - cw treatment dose Fluc 400 IV QD  - ID following  - ECHO- pending  - send HLH work up- ferritin, fibrinogen, triglycerides, soluble CD 25 and ANC       R DCD DDRT 4/21/22:  - S/P removal of ureteral stent on 7/12  - Uptrending SCr, Repeat Renal doppler done 7/25 - results noted   - DM diet as tolerated with aspiration precautions  - Continue Doxazosin/Proscar for BPH  - OOB with RN/PT  - Anemia: s/p 1u PRBC continue Retacrit   - Leukopenia: held valcyte, CMV negative. Held MMF   -- dc bicarb gtt    Immunosuppression:   - Belatacept: 6/23, 7/4, 4/18 (initial loading). Re-loaded 7/8 as there was a gap between doses 2/2 seizures.  - MMF held  - continue Pred 5  - PPx: Valcyte HELD/PPI/nystatin (thrush)/Bactrim    Seizure Disorder:  - MRI head 6/27 with less concerns for PRES, likely ischemic changes  - Remains seizure free  - Antiepileptic regimen per Neurology, on Phenytoin 100mg TID, no further adjustment required  - Keep Mag > 2    DM  - on Lantus/Lispro, Endocrine following     AFib  RIJ DVT   - Eliquis with ~40% less efficacy while on fosphenytoin.    - on  Lovenox 60mg daily  (from bid) in setting of transaminitis   - rate controlled    HTN:  - Nifedipine/Cardura/Coreg.   - dced Hydralazine (in setting of transaminitis     Dispo:   In  SICU for close monitoring  High risk for decompensation  Discussed with pt's son.     66M with h/o DM type II, HTN, CAD s/p CABG in 2020, Afib on Eliquis, CVA in 2019 due to Afib, Seizure d/o (last episode was on 4/8/22), h/o GI bleed in 2/2022 EGD with duodenal ulcer and ESRD on HD s/p R DDRT from DCD donor on 4/21/22 complicated by DGF requiring HD until 4/29/22 now with good graft function who presented with status epilepticus in setting of UTI/antibiotics and sub-therapeutic valproic acid level         Acute Transaminitis - now improving   - Hepatology consulted   - hepatotoxic medications held  - RUQ abd- Abdominal doppler - no PVT   - CT a/p non contrast- small perinephric collection   - Labs: Hepatitis panel, BK PCR, EBV PCR, CMV with PCR   - Infectious w/u: bld/urine cxs- sent f/u final results    - c/w  meropenem   - cw treatment dose Fluc 400 IV QD  - ID following  - ECHO- pending  - send HLH work up- ferritin, fibrinogen, triglycerides, soluble CD 25 and ANC       R DCD DDRT 4/21/22:  - S/P removal of ureteral stent on 7/12  - Uptrending SCr, Repeat Renal doppler done 7/25 - results noted   - DM diet as tolerated with aspiration precautions  - Continue Doxazosin/Proscar for BPH  - OOB with RN/PT  - Anemia: s/p 1u PRBC continue Retacrit   - Leukopenia: held valcyte, CMV negative. Held MMF   - repeat renal us today- f/u final read  -- dc bicarb gtt  - IR for shiley placement  - HD per nephrology     Immunosuppression:   - Belatacept: 6/23, 7/4, 4/18 (initial loading). Re-loaded 7/8 as there was a gap between doses 2/2 seizures.  - MMF held  - continue Pred 5  - PPx: Valcyte HELD/PPI/nystatin (thrush)/Bactrim    Seizure Disorder:  - MRI head 6/27 with less concerns for PRES, likely ischemic changes  - Remains seizure free  - Antiepileptic regimen per Neurology, on Phenytoin 100mg TID, no further adjustment required  - Keep Mag > 2    DM  - on Lantus/Lispro, Endocrine following     AFib  RIJ DVT   - Eliquis with ~40% less efficacy while on fosphenytoin.    - on  Lovenox 60mg daily  (from bid) in setting of transaminitis   - rate controlled    HTN:  - Nifedipine/Cardura/Coreg.   - dced Hydralazine (in setting of transaminitis )    Dispo:   In  SICU for close monitoring  High risk for decompensation  Discussed with pt's son.     66M with h/o DM type II, HTN, CAD s/p CABG in 2020, Afib on Eliquis, CVA in 2019 due to Afib, Seizure d/o (last episode was on 4/8/22), h/o GI bleed in 2/2022 EGD with duodenal ulcer and ESRD on HD s/p R DDRT from DCD donor on 4/21/22 complicated by DGF requiring HD until 4/29/22 now with good graft function who presented with status epilepticus in setting of UTI/antibiotics and sub-therapeutic valproic acid level         Acute Transaminitis - now improving   - Hepatology consulted   - hepatotoxic medications held  - RUQ abd- Abdominal doppler - no PVT   - CT a/p non contrast- small perinephric collection   - Labs: Hepatitis panel, BK PCR, EBV PCR, CMV with PCR   - Infectious w/u: bld/urine cxs- sent f/u final results    - c/w  meropenem   - cw treatment dose Fluc 400 IV QD  - ID following  - ECHO- pending  - send HLH work up- ferritin, fibrinogen, triglycerides, soluble CD 25 and ANC       R DCD DDRT 4/21/22:  - S/P removal of ureteral stent on 7/12  - Uptrending SCr, Repeat Renal doppler done 7/25 - results noted   - DM diet as tolerated with aspiration precautions  - Continue Doxazosin/Proscar for BPH  - OOB with RN/PT  - Anemia: s/p 1u PRBC continue Retacrit   - Leukopenia: held valcyte, CMV negative. Held MMF   - repeat renal us today- f/u final read  -- dc bicarb gtt  - IR for shiley placement  - HD per nephrology     Immunosuppression:   - Belatacept: 6/23, 7/4, 7/8. Re-loaded 7/8,  as there was a gap between doses 2/2 seizures. 7/18,  next dose 8/1  - MMF held  - continue Pred 5  - PPx: Valcyte/ bactrim on hold. On PPI/fluc    Seizure Disorder:  - MRI head 6/27 with less concerns for PRES, likely ischemic changes  - Remains seizure free  - Antiepileptic regimen per Neurology, on Phenytoin 100mg TID, no further adjustment required  - Keep Mag > 2    DM  - on Lantus/Lispro, Endocrine following     AFib  RIJ DVT   - Eliquis with ~40% less efficacy while on fosphenytoin.    - on  Lovenox 60mg daily  (from bid) in setting of transaminitis   - rate controlled    HTN:  - Nifedipine/Cardura/Coreg.   - dced Hydralazine (in setting of transaminitis )    Dispo:   In  SICU for close monitoring  High risk for decompensation  Discussed with pt's son.

## 2022-07-27 NOTE — PROGRESS NOTE ADULT - ASSESSMENT
66 year old Male with PMHx ESRD s/p permacath removal 5/10/22 s/p Rt DDRT 4/21/22,  CVA (2019), Afib on apixaban, seizure activity, CAD s/p stents, CABG (2020), HTN, HLD, DM on insulin, gastric/duodenal ulcer, recent hospitalization 4/28/22 -5/10/22 with weakness/anemia found to have perinephric hematoma requiring evacuation and repair of bleeding arterial anastomosis who presented to [facility] for status epilepticus requiring intubation for hypoxic respiratory failure.     Subsequently developed continued seizures and hypothermia -> resolved   Was initiated on empiric antibiotics  s/p LP which was not suggestive of infectious process  Blood, urine and sputum cultures without positive sample    U/A (7/12) 161 WBC  UCx (7/13) 50-99K ESBL Klebsiella   s/p 3 days of Ertapenem    UA (7/25) with 40 WBC  UCx (7/25) 50-99k GNR     Now with transaminitis that is trending down   Febrile  Worsening renal function, acidosis, oliguria, plan for Nephrostomy and temporary HD   RUQ (7/25) with hepatomegaly   CT c/a/p (7/25) with only small perinephric fluid collection, small volume ascites.    RVP (7/25) Negative  CMV (7/22) Negative  HSV (7/26) Negative     #Fever, Transaminitis, Renal Transplant Recipient  --S/p Vancomycin 1g x1 07/25 -> no further Vancomycin  --Meropenem 1g IV Q12H, continue   --Follow up on Adenovirus, Parvovirus, HHV6, EBV, Hep B PCR   --Doubt need Fluconazole at this point  --Continue to follow CBC with diff  --Continue to follow renal function (Cr/BUN)  --Continue to follow transaminases  --Continue to follow temperature curve  --Follow up on preliminary blood cultures  --Follow up on preliminary urine culture 66 year old Male with PMHx ESRD s/p permacath removal 5/10/22 s/p Rt DDRT 4/21/22,  CVA (2019), Afib on apixaban, seizure activity, CAD s/p stents, CABG (2020), HTN, HLD, DM on insulin, gastric/duodenal ulcer, recent hospitalization 4/28/22 -5/10/22 with weakness/anemia found to have perinephric hematoma requiring evacuation and repair of bleeding arterial anastomosis who presented to [facility] for status epilepticus requiring intubation for hypoxic respiratory failure.     Subsequently developed continued seizures and hypothermia -> resolved   Was initiated on empiric antibiotics  s/p LP which was not suggestive of infectious process  Blood, urine and sputum cultures without positive sample    U/A (7/12) 161 WBC  UCx (7/13) 50-99K ESBL Klebsiella   s/p 3 days of Ertapenem    UA (7/25) with 40 WBC  UCx (7/25) 50-99k GNR     Now with transaminitis that is trending down   Febrile  Worsening renal function, acidosis, oliguria, potential plan for Nephrostomy and temporary HD   RUQ (7/25) with hepatomegaly   CT c/a/p (7/25) with only small perinephric fluid collection, small volume ascites.    RVP (7/25) Negative  CMV (7/22) Negative  HSV (7/26) Negative     #Fever, Transaminitis, Renal Transplant Recipient  --S/p Vancomycin 1g x1 07/25 -> no further Vancomycin  --Meropenem 1g IV Q12H, continue   --Follow up on Adenovirus, Parvovirus, HHV6, EBV, Hep B PCR   --Doubt need Fluconazole at this point  --Continue to follow CBC with diff  --Continue to follow renal function (Cr/BUN)  --Continue to follow transaminases  --Continue to follow temperature curve  --Follow up on preliminary blood cultures  --Follow up on preliminary urine culture

## 2022-07-27 NOTE — PROGRESS NOTE ADULT - SUBJECTIVE AND OBJECTIVE BOX
Transplant Surgery Multidisciplinary Progress Note   --------------------------------------------------------------  R DCD DDRT    22    Present:  Patient seen and examined with multidisciplinary team including Transplant Surgeon: Dr. Tena, Transplant Nephrologist: Dr. Alfred, Nephrology Fellow Dr. Thompson, Transplant Pharmacist Bryan Lmi and Josiah Martinez, NP Altagracia Montelongo  and unit RN during am rounds.  Disciplines not in attendance will be notified of the plan.    HPI: 66M with PMH: DM type II, HTN, CAD s/p CABG in , AFib on Eliquis, CVA in  due to Afib, Seizure d/o following CVA, last episode was on 22, h/o GIB in 2022 EGD with duodenal ulcer.  ESRD due to DM was on HD since .     s/p R DCD DDRT on 22.  Donor was 58 , KDPI 82%, DCD, single vessels and ureter, HLA mismatch 1, 2, 2. No DSA, cPRA 0%. CMV +/+  Course complicated by DGF, was on HD until 22. Re-admitted in May for anemia in the setting of large perinephric hematoma s/p evacuation and repair of arterial anastomosis on 22. Intra operative biopsy showed no rejection, Creatinine ranging ~2mg/dL.    In Rehab:  He was recently dx with klebsiella UTI rx with ertapenem - then switched to Levaquin day #6.    He also had parainfluenza pneumonia. Was at rehab progressing well.      Hospital course:  - 6/10: transferred from rehab facility for seizure status epilepticus. Intubated for respiratory protection and transferred to MICU.  EEG showed no seizure activity. Neuro consulted.   - : Extubated. Found to have R pleural effusion. s/p Thoracentesis 1.3L. Eliquis changed to heparin drip.  Post procedure drop in H&H. 5u PRBC, 2 u FFP.   -  evening: Transferred to SICU from MICU for further care in setting of hypoxia, significant R pleural effusion, anemia and hemodynamic instability  -  Intubated at 9pm and sedated on Precedex.  CT placed, 600ml blood drained.  1L total overnight, started pressors  -  Received Protamine for PTT >100 (was on Heparin drip started for AF), 2 u FFP and 5u PRBC total  -  s/p VATS 2.2L old blood removed; second chest tube placed  - -: chest tubes removed  - : ?PRES vs. chronic ischemic changes on MRI, off Envarsus, switched to Belatacept  with good graft function  - : Tx to SICU for refractory seizures, improved with IV antiepileptic regimen  - 7/10: Passed bedside S&S in SICU. Downgraded from SICU to floor.   - : OR-->URETERAL STENT REMOVED  - 7/15: ESBL Kleb UTI (50-90K), completed Ertapenem x 3 Days  - : Tx to SICU for Sepsis/ elevated LFTS      Interval Events:  - Persistently febrile Tmax 38.3  - On miguel/ started on fluc   - Cultures sent prelim bl no growth, Ur prelim 50-99 K GNRs  - worseing ANA started on IVF   - LFTs downtrending           Immunosuppression:   Induction:  Thymoglobulin                                        Maintenance:  - Belatacept: , , , . Next dose   - MMF held (was on 500 BID leukopenia), Pred 5mg daily   - Ongoing monitoring for signs of rejection.    Potential Discharge date: pending clinical improvement   Education:  Medications  Plan of care:  See Below      MEDICATIONS  (STANDING):  carvedilol 25 milliGRAM(s) Oral every 12 hours  chlorhexidine 2% Cloths 1 Application(s) Topical <User Schedule>  doxazosin 4 milliGRAM(s) Oral <User Schedule>  epoetin cortney-epbx (RETACRIT) Injectable 36231 Unit(s) SubCutaneous every 7 days  filgrastim-sndz (ZARXIO) Injectable 300 MICROGram(s) SubCutaneous once  finasteride 5 milliGRAM(s) Oral daily  fluconAZOLE IVPB 400 milliGRAM(s) IV Intermittent every 24 hours  insulin glargine Injectable (LANTUS) 5 Unit(s) SubCutaneous at bedtime  insulin lispro (ADMELOG) corrective regimen sliding scale   SubCutaneous three times a day with meals  insulin lispro (ADMELOG) corrective regimen sliding scale   SubCutaneous at bedtime  insulin lispro Injectable (ADMELOG) 4 Unit(s) SubCutaneous three times a day before meals  levothyroxine 50 MICROGram(s) Oral daily  melatonin 6 milliGRAM(s) Oral at bedtime  meropenem  IVPB      meropenem  IVPB 1000 milliGRAM(s) IV Intermittent every 12 hours  NIFEdipine XL 60 milliGRAM(s) Oral daily  pantoprazole    Tablet 40 milliGRAM(s) Oral daily  phenytoin   Capsule 100 milliGRAM(s) Oral every 8 hours  polyethylene glycol 3350 17 Gram(s) Oral daily  predniSONE   Tablet 5 milliGRAM(s) Oral daily  senna 2 Tablet(s) Oral at bedtime  sodium bicarbonate 1300 milliGRAM(s) Oral <User Schedule>    MEDICATIONS  (PRN):      PAST MEDICAL & SURGICAL HISTORY:  Hypertension      Diabetes      Dyslipidemia      CAD (Coronary Artery Disease)  with Stents in 2009 and 2019, s/p off-pump C3L on 20      Hypothyroidism      CVA (cerebral vascular accident)  19 with residual bilateral weakness      Anemia      PEG (percutaneous endoscopic gastrostomy) status  removed 2020      Intubation of airway performed without difficulty  Dec 2019  CVA c/b status epilepticus requiring intubation and PEG in 2019-      ESRD on dialysis  M/W/F      Seizures  after CVA, last seizure was last week 22      History of insertion of stent into coronary artery bypass graft   and       S/P CABG x 3  off pump C3L on 20          Vital Signs Last 24 Hrs  T(C): 37.9 (2022 11:00), Max: 38.4 (2022 07:00)  T(F): 100.2 (2022 11:00), Max: 101.1 (2022 07:00)  HR: 58 (2022 13:00) (53 - 63)  BP: 146/62 (2022 13:00) (94/55 - 169/72)  BP(mean): 89 (2022 13:00) (67 - 105)  RR: 24 (2022 13:00) (17 - 29)  SpO2: 94% (2022 13:00) (94% - 100%)    Parameters below as of 2022 19:00  Patient On (Oxygen Delivery Method): room air        I&O's Summary    2022 07:01  -  2022 07:00  --------------------------------------------------------  IN: 2500 mL / OUT: 120 mL / NET: 2380 mL    2022 07:01  -  2022 13:35  --------------------------------------------------------  IN: 600 mL / OUT: 55 mL / NET: 545 mL          LABS:                        7.9    2.97  )-----------( 315      ( 2022 13:00 )             23.7     07-26    131<L>  |  99  |  102<H>  ----------------------------<  121<H>  5.2   |  14<L>  |  3.34<H>    Ca    8.6      2022 21:51  Phos  5.7       Mg     2.1         TPro  6.4  /  Alb  2.6<L>  /  TBili  0.4  /  DBili  x   /  AST  144<H>  /  ALT  336<H>  /  AlkPhos  275<H>      PT/INR - ( 2022 21:51 )   PT: 16.1 sec;   INR: 1.40 ratio         PTT - ( 2022 21:51 )  PTT:37.3 sec  Urinalysis Basic - ( 2022 18:28 )    Color: Yellow / Appearance: Slightly Turbid / S.020 / pH: x  Gluc: x / Ketone: Negative  / Bili: Negative / Urobili: Negative   Blood: x / Protein: 100 / Nitrite: Negative   Leuk Esterase: Large / RBC: 2 /hpf / WBC 40 /HPF   Sq Epi: x / Non Sq Epi: 0 /hpf / Bacteria: Many      CAPILLARY BLOOD GLUCOSE      POCT Blood Glucose.: 156 mg/dL (2022 12:24)  POCT Blood Glucose.: 163 mg/dL (2022 08:07)  POCT Blood Glucose.: 136 mg/dL (2022 21:18)  POCT Blood Glucose.: 228 mg/dL (2022 17:08)    LIVER FUNCTIONS - ( 2022 21:51 )  Alb: 2.6 g/dL / Pro: 6.4 g/dL / ALK PHOS: 275 U/L / ALT: 336 U/L / AST: 144 U/L / GGT: x             Culture - Urine (collected 22 @ 18:27)  Source: Catheterized Catheterized  Preliminary Report (22 @ 08:11):    50,000 - 99,000 CFU/mL Gram Negative Rods    Culture - Blood (collected 22 @ 14:28)  Source: .Blood Blood  Preliminary Report (22 @ 22:01):    No growth to date.    Culture - Blood (collected 22 @ 11:59)  Source: .Blood Blood  Preliminary Report (22 @ 15:02):    No growth to date.      Cultures:    Culture - Urine (collected 22 @ 18:27)  Source: Catheterized Catheterized  Preliminary Report (22 @ 08:11):    50,000 - 99,000 CFU/mL Gram Negative Rods    Culture - Blood (collected 22 @ 14:28)  Source: .Blood Blood  Preliminary Report (22 @ 22:01):    No growth to date.    Culture - Blood (collected 22 @ 11:59)  Source: .Blood Blood  Preliminary Report (22 @ 15:02):    No growth to date.          REVIEW OF SYSTEMS  ------------------------------------  unable to obtain     PHYSICAL EXAM:  Constitutional: Frail looking   Eyes: Anicteric, PERRLA  ENMT: nc/at,   Neck: supple  Respiratory: CTA   Cardiovascular: RRR  Gastrointestinal: Soft, non distended, NT, RLQ incision healed   Genitourinary: tucker insitu   Extremities: SCD's in place and working bilaterally, overall edema has improved  Vascular: Palpable dp pulses bilaterally  Neurological: AAOx3; sleepy/ arousable   Skin: no rashes, ulcerations or lesions  Musculoskeletal: Moving all extremities, global weakness  Psychiatric: calm, follows commands and interacts appropriately

## 2022-07-27 NOTE — PROGRESS NOTE ADULT - NUTRITIONAL ASSESSMENT
Diet, Consistent Carbohydrate w/Evening Snack:   1200mL Fluid Restriction ([id number])  Supplement Feeding Modality:  Oral  Nepro Cans or Servings Per Day:  2       Frequency:  Daily (07-25-22 @ 10:54) [Active]      Please see RD assessment and/or follow up.  Managed by primary team as well

## 2022-07-27 NOTE — PROGRESS NOTE ADULT - CRITICAL CARE ATTENDING COMMENT
65 yo m, DDKT, now with increasing Cr, LFTs and AMS.  - Continue current neurologic management. Multimodal pain management MS appears improved.  - RA sat >90.  - On nifedipine, coreg.  - NPO, potential IR for HD access and nephrostomy if technically feasible.  - LFts downtrending.   - 100 UOP. Cr 3.34. Nephrology following, HD today.   - Net +3.4L. UF as tolerated.  - Hgb 7.7, monitor CBC. Epo today.  - DDAVP in anticipation of procedures.  - WBC 3.0, repeat Neupogen yesterday. Repeat dose today.  - Continue meropenem, fluconazole.  - Monitor ID workup.  - HLH workup sent.

## 2022-07-27 NOTE — PROGRESS NOTE ADULT - ASSESSMENT
ASSESSMENT:      Neuro:  - Multimodal pain control w/Tylenol  - Continue Phenytoin 100 mg q8 for seizure ppx  - Melatonin + protected sleep time    Resp: on room air  - Out of bed to chair, ambulate as tolerated, and incentive spirometry to prevent atelectasis  - Aggressive chest PT  - SpO2 > 92%    CVS:   - Home Coreg 25 mg q12  - Nifedipine 60 mg qd  - f/u Echo results today to r/o endocarditis  GI:   - Hold Lipitor, Hydralazine, Bactrim due to transaminitis  - Consistent Carb diet  - Bowel regimen with senna & Miralax  - Protonix PO home med    Renal:  - Monitor I&Os and electrolytes  - replete electrolytes as needed   - Continue Doxazosin + Finasteride  - Continue PO Bicarbonate 1300 BID  - Continue Bicarb infusion   - f/u renal duplex results today    Heme:  - Monitor CBC and coags  - On Lovenox 60 qd  - Epogen for anemia of chronic disease    ID:   - Monitor for clinical evidence of active infection  - Monitor WBC, temperature, and procalcitonin  - Meropenam 1g q12   - No fluconazole per transplant ID  - f/u viral PCR results    Endo:   - Monitor glucose of bmp  - Lantus 5, 4 premeal, and ISS  - BG goal of 100-180  - If BG continues to be >180, increase premeal to 5  - Synthroid for hypothyroidism      Lines/Tubes:         ASSESSMENT:      Neuro:  - Multimodal pain control w/Tylenol  - Continue Phenytoin 100 mg q8 for seizure ppx  - Melatonin + protected sleep time    Resp: on room air  - Out of bed to chair, ambulate as tolerated, and incentive spirometry to prevent atelectasis  - Aggressive chest PT  - SpO2 > 92%    CVS:   - Home Coreg 25 mg q12  - Nifedipine 60 mg qd  - f/u Echo results today to r/o endocarditis  GI:   - Hold Lipitor, Hydralazine, Bactrim due to transaminitis  - Consistent Carb diet  - Bowel regimen with senna & Miralax  - Protonix PO home med    Renal:  - Monitor I&Os and electrolytes  - replete electrolytes as needed   - Continue Doxazosin + Finasteride  - Continue PO Bicarbonate 1300 BID  - Continue Bicarb infusion   - f/u renal duplex results today    Heme:  - Monitor CBC and coags  - On Lovenox 60 qd  - Epogen for anemia of chronic disease    ID:   - Monitor for clinical evidence of active infection  - Monitor WBC, temperature, and procalcitonin  - Meropenam 1g q12   - No fluconazole per transplant ID  - f/u viral PCR results  - f/u with ID team on Neupogen plans    Endo:   - Monitor glucose of bmp  - Lantus 5, 4 premeal, and ISS  - BG goal of 100-180  - If BG continues to be >180, increase premeal to 5  - Synthroid for hypothyroidism      Lines/Tubes:

## 2022-07-27 NOTE — PROGRESS NOTE ADULT - SUBJECTIVE AND OBJECTIVE BOX
DIABETES FOLLOW UP NOTE: Saw pt earlier today    Chief Complaint: Endocrine consult requested for management of T2DM    INTERVAL HX: Pt stable, saw pt in SICU due to sepsis and transaminitis. NPO today with BG mostly at goal without hypoglycemia.     Review of Systems:  General: As above  Cardiovascular: No chest pain, palpitations  Respiratory: No SOB, no cough  GI: No nausea, vomiting, abdominal pain  Endocrine: No polyuria, polydipsia or S&Sx of hypoglycemia    Allergies    No Known Allergies    Intolerances      MEDICATIONS:  fluconAZOLE IVPB 400 milliGRAM(s) IV Intermittent every 24 hours  insulin glargine Injectable (LANTUS) 5 Unit(s) SubCutaneous at bedtime  insulin lispro (ADMELOG) corrective regimen sliding scale   SubCutaneous three times a day with meals  insulin lispro (ADMELOG) corrective regimen sliding scale   SubCutaneous at bedtime  insulin lispro Injectable (ADMELOG) 4 Unit(s) SubCutaneous three times a day before meals  levothyroxine 50 MICROGram(s) Oral daily    meropenem  IVPB 1000 milliGRAM(s) IV Intermittent every 12 hours  predniSONE   Tablet 5 milliGRAM(s) Oral daily        PHYSICAL EXAM:  VITALS: T(C): 37.7 (07-27-22 @ 15:00)  T(F): 99.9 (07-27-22 @ 15:00), Max: 101.1 (07-27-22 @ 07:00)  HR: 54 (07-27-22 @ 17:00) (53 - 63)  BP: 120/58 (07-27-22 @ 17:00) (94/55 - 169/72)  RR:  (17 - 29)  SpO2:  (94% - 100%)  Wt(kg): --  GENERAL: Male sitting in chair in NAD. Abdomen: Soft, nontender, non distended  Extremities: Warm, no edema in all 4 exts  NEURO: Sleepy but able to answer simple questions    LABS:  POCT Blood Glucose.: 107 mg/dL (07-27-22 @ 17:15)  POCT Blood Glucose.: 156 mg/dL (07-27-22 @ 12:24)  POCT Blood Glucose.: 163 mg/dL (07-27-22 @ 08:07)  POCT Blood Glucose.: 136 mg/dL (07-26-22 @ 21:18)  POCT Blood Glucose.: 228 mg/dL (07-26-22 @ 17:08)  POCT Blood Glucose.: 197 mg/dL (07-26-22 @ 12:13)  POCT Blood Glucose.: 139 mg/dL (07-26-22 @ 08:22)  POCT Blood Glucose.: 121 mg/dL (07-25-22 @ 22:31)  POCT Blood Glucose.: 173 mg/dL (07-25-22 @ 18:09)  POCT Blood Glucose.: 189 mg/dL (07-25-22 @ 11:32)  POCT Blood Glucose.: 155 mg/dL (07-25-22 @ 08:38)  POCT Blood Glucose.: 114 mg/dL (07-24-22 @ 21:11)                            7.9    2.97  )-----------( 315      ( 27 Jul 2022 13:00 )             23.7       07-27    128<L>  |  95<L>  |  104<H>  ----------------------------<  152<H>  5.2   |  16<L>  |  3.77<H>    eGFR: 17<L>    Ca    8.1<L>      07-27  Mg     2.1     07-26  Phos  5.7     07-26    TPro  6.2  /  Alb  2.5<L>  /  TBili  0.4  /  DBili  0.2  /  AST  136<H>  /  ALT  293<H>  /  AlkPhos  301<H>  07-27      Thyroid Function Tests:  07-06 @ 18:36 TSH 4.69 FreeT4 -- T3 43 Anti TPO -- Anti Thyroglobulin Ab -- TSI --      A1C with Estimated Average Glucose Result: 6.0 % (06-30-22 @ 05:49)      Estimated Average Glucose: 126 mg/dL (06-30-22 @ 05:49)        07-27 Chol -- Direct LDL -- LDL calculated -- HDL -- Trig 118

## 2022-07-27 NOTE — CHART NOTE - NSCHARTNOTEFT_GEN_A_CORE
I have seen the patient and reviewed dialysis prescription. Dialysis access placed by I/R. Dialysis prescription has been adjusted for optimized control of volume status, uremia and electrolytes. Management of additional metabolic abnormalities/anemia will continue to be addressed on follow up.

## 2022-07-27 NOTE — PROGRESS NOTE ADULT - ASSESSMENT
66 yr old M with Type 2 DM> unknown control due to skewed A1C 6.6% secondary to chronic anemia and s/p blood tx. On basal/bolus insulin therapy PTA. DM c/b ESRD s/p DDRT on 3/21, CVA, CAD s/p CABG, Afib on apixaban, seizure activity, HTN, HLD, h/o GI bleed in 2/2022 EGD with duodenal ulcer, discharged to [facility] rehab center after prior hospitalization, now re-admitted from [facility] for seizures c/f status epilepticus in setting of UTI. endocrine consulted for steroid induced hyperglycemia, now on home dose prednisone 5mg. Prolonged hospital course w/ ICU stay, previously intubated/extubated, previous seizures. S/p ureteral stent removal 7/12/22 pt is now off tube feeds and tolerating PO diet but back to ICU due to sepsis> on antibiotic. NPO today for procedure and testing. Will c/w present insulin regimen for now since BGs are at goal 100 to 180s.

## 2022-07-27 NOTE — CONSULT NOTE ADULT - ASSESSMENT
Vascular & Interventional Radiology Consult Note    Evaluate for Procedure: right kidney transplant PCN    HPI: 66 yr old man with h/o ESRD due to DM on HD since 2019, s/p DDRT from DCD donor on 4/21/22 complicated by DGF requiring HD until 4/29/22 now admitted with seizures/status epilepticus. Admission in May for anemia in s/o large perinephric hematoma s/p evacuation and repair of arterial anastomosis on 5/2/22. Intra operative biopsy showed no rejection, Creatinine currently ranging ~2mg/dL and transplant c/f post-renal obstruction of RLQ DDRT. IR c/s PCN.    Allergies:   Medications (Abx/Cardiac/Anticoagulation/Blood Products)    carvedilol: 25 milliGRAM(s) Oral (07-27 @ 05:10)  doxazosin: 4 milliGRAM(s) Oral (07-27 @ 04:26)  enoxaparin Injectable: 60 milliGRAM(s) SubCutaneous (07-26 @ 08:47)  fluconAZOLE IVPB: 100 mL/Hr IV Intermittent (07-27 @ 08:18)  meropenem  IVPB: 100 mL/Hr IV Intermittent (07-25 @ 14:43)  meropenem  IVPB: 100 mL/Hr IV Intermittent (07-27 @ 05:10)  NIFEdipine XL: 60 milliGRAM(s) Oral (07-26 @ 05:09)  NIFEdipine XL: 60 milliGRAM(s) Oral (07-27 @ 05:10)  nystatin    Suspension: 576152 Unit(s) Oral (07-26 @ 05:09)  vancomycin  IVPB: 250 mL/Hr IV Intermittent (07-25 @ 18:16)    Data:    T(C): 37.9  HR: 57  BP: 159/73  RR: 19  SpO2: 98%    -WBC 3.06 / HgB 7.7 / Hct 23.4 / Plt 346  -Na 131 / Cl 99 /  / Glucose 121  -K 5.2 / CO2 14 / Cr 3.34  - / Alk Phos 275 / T.Bili 0.4  -INR1.40      Culture - Urine (collected 25 Jul 2022 18:27)  Source: Catheterized Catheterized  Preliminary Report (27 Jul 2022 08:11):    50,000 - 99,000 CFU/mL Gram Negative Rods    Culture - Blood (collected 25 Jul 2022 14:28)  Source: .Blood Blood  Preliminary Report (26 Jul 2022 22:01):    No growth to date.    Culture - Blood (collected 25 Jul 2022 11:59)  Source: .Blood Blood  Preliminary Report (26 Jul 2022 15:02):    No growth to date.        Imaging: < from: CT Abdomen and Pelvis No Cont (07.25.22 @ 17:16) >  IMPRESSION:  Limited study to evaluate for infectionwithout intravenous contrast.    Small perinephric fluid collection. No hydronephrosis.    Small volume ascites.    --- End of Report ---    < end of copied text >      Assessment:   66 yr old man with h/o ESRD due to DM on HD since 2019, s/p DDRT from DCD donor on 4/21/22 complicated by DGF requiring HD until 4/29/22 now admitted with seizures/status epilepticus. Admission in May for anemia in s/o large perinephric hematoma s/p evacuation and repair of arterial anastomosis on 5/2/22. Intra operative biopsy showed no rejection, Creatinine currently ranging ~2mg/dL and transplant c/f post-renal obstruction of RLQ DDRT. IR c/s PCN.    Plan:  - CTAP w/small perinephric fluid collection & no hydro.  - Small perinephric fluid collection likely in s/o ESBL Klebsiella UTI.  - No indication for PCN at this time  - Will re-eval after kidney US is obtained.  - Plan discussed among IR attending Dr. Wyman & Transplant attending Dr. Tena     Vascular & Interventional Radiology Consult Note    Evaluate for Procedure: right kidney transplant PCN    HPI: 66 yr old man with h/o ESRD due to DM on HD since 2019, s/p DDRT from DCD donor on 4/21/22 complicated by DGF requiring HD until 4/29/22 now admitted with seizures/status epilepticus. Admission in May for anemia in s/o large perinephric hematoma s/p evacuation and repair of arterial anastomosis on 5/2/22. Intra operative biopsy showed no rejection, Creatinine currently ranging ~2mg/dL and transplant c/f post-renal obstruction of RLQ DDRT. IR c/s PCN.    Allergies:   Medications (Abx/Cardiac/Anticoagulation/Blood Products)    carvedilol: 25 milliGRAM(s) Oral (07-27 @ 05:10)  doxazosin: 4 milliGRAM(s) Oral (07-27 @ 04:26)  enoxaparin Injectable: 60 milliGRAM(s) SubCutaneous (07-26 @ 08:47)  fluconAZOLE IVPB: 100 mL/Hr IV Intermittent (07-27 @ 08:18)  meropenem  IVPB: 100 mL/Hr IV Intermittent (07-25 @ 14:43)  meropenem  IVPB: 100 mL/Hr IV Intermittent (07-27 @ 05:10)  NIFEdipine XL: 60 milliGRAM(s) Oral (07-26 @ 05:09)  NIFEdipine XL: 60 milliGRAM(s) Oral (07-27 @ 05:10)  nystatin    Suspension: [id number] Unit(s) Oral (07-26 @ 05:09)  vancomycin  IVPB: 250 mL/Hr IV Intermittent (07-25 @ 18:16)    Data:    T(C): 37.9  HR: 57  BP: 159/73  RR: 19  SpO2: 98%    -WBC 3.06 / HgB 7.7 / Hct 23.4 / Plt 346  -Na 131 / Cl 99 /  / Glucose 121  -K 5.2 / CO2 14 / Cr 3.34  - / Alk Phos 275 / T.Bili 0.4  -INR1.40      Culture - Urine (collected 25 Jul 2022 18:27)  Source: Catheterized Catheterized  Preliminary Report (27 Jul 2022 08:11):    50,000 - 99,000 CFU/mL Gram Negative Rods    Culture - Blood (collected 25 Jul 2022 14:28)  Source: .Blood Blood  Preliminary Report (26 Jul 2022 22:01):    No growth to date.    Culture - Blood (collected 25 Jul 2022 11:59)  Source: .Blood Blood  Preliminary Report (26 Jul 2022 15:02):    No growth to date.        Imaging: < from: CT Abdomen and Pelvis No Cont (07.25.22 @ 17:16) >  IMPRESSION:  Limited study to evaluate for infectionwithout intravenous contrast.    Small perinephric fluid collection. No hydronephrosis.    Small volume ascites.    --- End of Report ---    < end of copied text >      Assessment:   66 yr old man with h/o ESRD due to DM on HD since 2019, s/p DDRT from DCD donor on 4/21/22 complicated by DGF requiring HD until 4/29/22 now admitted with seizures/status epilepticus. Admission in May for anemia in s/o large perinephric hematoma s/p evacuation and repair of arterial anastomosis on 5/2/22. Intra operative biopsy showed no rejection, Creatinine currently ranging ~2mg/dL and transplant c/f post-renal obstruction of RLQ DDRT. IR c/s PCN.    Plan:  - CTAP w/small perinephric fluid collection & no hydro.  - Small perinephric fluid collection likely in s/o ESBL Klebsiella UTI.  - f/u US with no hydro.  - No indication for PCN at this time  - Plan discussed among IR attending Dr. Wyman.   Vascular & Interventional Radiology Consult Note    Evaluate for Procedure: right kidney transplant PCN    HPI: 66 yr old man with h/o ESRD due to DM on HD since 2019, s/p DDRT from DCD donor on 4/21/22 complicated by DGF requiring HD until 4/29/22 now admitted with seizures/status epilepticus. Admission in May for anemia in s/o large perinephric hematoma s/p evacuation and repair of arterial anastomosis on 5/2/22. Intra operative biopsy showed no rejection, Creatinine currently ranging ~2mg/dL and transplant c/f post-renal obstruction of RLQ DDRT. IR c/s PCN.    Allergies:   Medications (Abx/Cardiac/Anticoagulation/Blood Products)    carvedilol: 25 milliGRAM(s) Oral (07-27 @ 05:10)  doxazosin: 4 milliGRAM(s) Oral (07-27 @ 04:26)  enoxaparin Injectable: 60 milliGRAM(s) SubCutaneous (07-26 @ 08:47)  fluconAZOLE IVPB: 100 mL/Hr IV Intermittent (07-27 @ 08:18)  meropenem  IVPB: 100 mL/Hr IV Intermittent (07-25 @ 14:43)  meropenem  IVPB: 100 mL/Hr IV Intermittent (07-27 @ 05:10)  NIFEdipine XL: 60 milliGRAM(s) Oral (07-26 @ 05:09)  NIFEdipine XL: 60 milliGRAM(s) Oral (07-27 @ 05:10)  nystatin    Suspension: [id number] Unit(s) Oral (07-26 @ 05:09)  vancomycin  IVPB: 250 mL/Hr IV Intermittent (07-25 @ 18:16)    Data:    T(C): 37.9  HR: 57  BP: 159/73  RR: 19  SpO2: 98%    -WBC 3.06 / HgB 7.7 / Hct 23.4 / Plt 346  -Na 131 / Cl 99 /  / Glucose 121  -K 5.2 / CO2 14 / Cr 3.34  - / Alk Phos 275 / T.Bili 0.4  -INR1.40      Culture - Urine (collected 25 Jul 2022 18:27)  Source: Catheterized Catheterized  Preliminary Report (27 Jul 2022 08:11):    50,000 - 99,000 CFU/mL Gram Negative Rods    Culture - Blood (collected 25 Jul 2022 14:28)  Source: .Blood Blood  Preliminary Report (26 Jul 2022 22:01):    No growth to date.    Culture - Blood (collected 25 Jul 2022 11:59)  Source: .Blood Blood  Preliminary Report (26 Jul 2022 15:02):    No growth to date.        Imaging: < from: CT Abdomen and Pelvis No Cont (07.25.22 @ 17:16) >  IMPRESSION:  Limited study to evaluate for infectionwithout intravenous contrast.    Small perinephric fluid collection. No hydronephrosis.    Small volume ascites.    --- End of Report ---    < end of copied text >      Assessment:   66 yr old man with h/o ESRD due to DM on HD since 2019, s/p DDRT from DCD donor on 4/21/22 complicated by DGF requiring HD until 4/29/22 now admitted with seizures/status epilepticus. Admission in May for anemia in s/o large perinephric hematoma s/p evacuation and repair of arterial anastomosis on 5/2/22. Intra operative biopsy showed no rejection, Creatinine currently ranging ~2mg/dL and transplant c/f post-renal obstruction of RLQ DDRT. IR c/s PCN.    Plan:  - CTAP w/small perinephric fluid collection & no hydro.  - Small perinephric fluid collection likely in s/o ESBL Klebsiella UTI.  - f/u US with no hydro.  - will continue to monitor  - Plan discussed among IR attending Dr. Wyman.

## 2022-07-27 NOTE — PROGRESS NOTE ADULT - PROBLEM SELECTOR PLAN 1
-Test BG AC/HS   -C/w Lantus 5 units at bedtime.    -C/w Admelog 4 units tid with meals ensure pt tolerating meal prior to administration to prevent hypoglycemia, If prandial BG ac meals consistently >180s increase to 5 units with meals  -C/w low Admelog  correction scale AC meals and Low HS scale  -Please inform endo team of any changes on steroid therapy or PO/TF status  Discharge  plan to rehab, c/w basal bolus insulin final doses as above if FBG at goal  Titrate further at rehab as necessary .   Outpatient f/u with Endocrinologist Dr. Lincoln. [address] [facility] [address]. Phone  Needs apt at time of discharge  -Needs optho/renal/cardiac f/u as out pt  -Make sure pt has insulin and DM supplies at time of discharge.

## 2022-07-27 NOTE — PROGRESS NOTE ADULT - SUBJECTIVE AND OBJECTIVE BOX
HISTORY  66y Male    24 HOUR EVENTS:  -Held hepatotoxic drugs (Hydralazine, Lipitor, Bactrim) leading to improved transaminitis  -RUQ US showed no evidence of portal vein thrombus  -Nifedipine decreased to 60 mg qd  -1L bolus of LR   -/hr switched to Bicarb 150 meQ in 5% dextrose at 100/hr    SUBJECTIVE/ROS:  [X] A ten-point review of systems was otherwise negative except as noted.  [ ] Due to altered mental status/intubation, subjective information were not able to be obtained from the patient. History was obtained, to the extent possible, from review of the chart and collateral sources of information.      NEURO  Exam: AOx4  Meds: melatonin 6 milliGRAM(s) Oral at bedtime  phenytoin   Capsule 100 milliGRAM(s) Oral every 8 hours    [x] Adequacy of sedation and pain control has been assessed and adjusted      RESPIRATORY  RR: 26 (07-27-22 @ 00:00) (17 - 33)  SpO2: 100% (07-27-22 @ 00:00) (95% - 100%)  Wt(kg): --  Exam: unlabored, clear to auscultation bilaterally  Mechanical Ventilation:   ABG - ( 25 Jul 2022 19:00 )  pH: 7.39  /  pCO2: 22    /  pO2: 75    / HCO3: 13    / Base Excess: -10.3 /  SaO2: 96.9    Lactate: x                [N/A] Extubation Readiness Assessed  Meds:       CARDIOVASCULAR  HR: 56 (07-27-22 @ 00:00) (53 - 63)  BP: 94/55 (07-27-22 @ 00:00) (94/55 - 153/67)  BP(mean): 67 (07-27-22 @ 00:00) (67 - 114)  ABP: --  ABP(mean): --  Wt(kg): --  CVP(cm H2O): --  VBG - ( 25 Jul 2022 18:07 )  pH: 7.37  /  pCO2: 27    /  pO2: 43    / HCO3: 16    / Base Excess: -8.6  /  SaO2: 73.1   Lactate: 1.4                Exam: regular rate and rhythm  Cardiac Rhythm: sinus  Perfusion     [x]Adequate   [ ]Inadequate  Mentation   [x]Normal       [ ]Reduced  Extremities  [x]Warm         [ ]Cool  Volume Status [ ]Hypervolemic [x]Euvolemic [ ]Hypovolemic  Meds: carvedilol 25 milliGRAM(s) Oral every 12 hours  doxazosin 4 milliGRAM(s) Oral <User Schedule>  NIFEdipine XL 60 milliGRAM(s) Oral daily        GI/NUTRITION  Exam: soft, nontender, nondistended, incision C/D/I  Diet: consistent carb diet  Meds: pantoprazole    Tablet 40 milliGRAM(s) Oral daily  polyethylene glycol 3350 17 Gram(s) Oral daily  senna 2 Tablet(s) Oral at bedtime      GENITOURINARY  I&O's Detail    07-25 @ 07:01 - 07-26 @ 07:00  --------------------------------------------------------  IN:    IV PiggyBack: 350 mL    Oral Fluid: 240 mL    PRBCs (Packed Red Blood Cells): 300 mL  Total IN: 890 mL    OUT:    Incontinent per Condom Catheter (mL): 250 mL    Indwelling Catheter - Urethral (mL): 190 mL  Total OUT: 440 mL    Total NET: 450 mL      07-26 @ 07:01 - 07-27 @ 00:08  --------------------------------------------------------  IN:    IV PiggyBack: 200 mL    Oral Fluid: 350 mL    sodium chloride 0.9%: 1300 mL  Total IN: 1850 mL    OUT:    Indwelling Catheter - Urethral (mL): 85 mL  Total OUT: 85 mL    Total NET: 1765 mL          07-26    131<L>  |  99  |  102<H>  ----------------------------<  121<H>  5.2   |  14<L>  |  3.34<H>    Ca    8.6      26 Jul 2022 21:51  Phos  5.7     07-26  Mg     2.1     07-26    TPro  6.4  /  Alb  2.6<L>  /  TBili  0.4  /  DBili  x   /  AST  144<H>  /  ALT  336<H>  /  AlkPhos  275<H>  07-26    [ ] Miranda catheter, indication: N/A  Meds: sodium bicarbonate 1300 milliGRAM(s) Oral <User Schedule>  sodium bicarbonate  Infusion 0.243 mEq/kG/Hr IV Continuous <Continuous>        HEMATOLOGIC  Meds: enoxaparin Injectable 60 milliGRAM(s) SubCutaneous <User Schedule>    [x] VTE Prophylaxis                        7.7    3.06  )-----------( 346      ( 26 Jul 2022 21:51 )             23.4     PT/INR - ( 26 Jul 2022 21:51 )   PT: 16.1 sec;   INR: 1.40 ratio         PTT - ( 26 Jul 2022 21:51 )  PTT:37.3 sec  Transfusion     [ ] PRBC   [ ] Platelets   [ ] FFP   [ ] Cryoprecipitate      INFECTIOUS DISEASES  WBC Count: 3.06 K/uL (07-26 @ 21:51)  WBC Count: 3.49 K/uL (07-26 @ 14:45)    RECENT CULTURES:  Specimen Source: .Blood Blood  Date/Time: 07-25 @ 14:28  Culture Results:   No growth to date.  Gram Stain: --  Organism: --  Specimen Source: .Blood Blood  Date/Time: 07-25 @ 11:59  Culture Results:   No growth to date.  Gram Stain: --  Organism: --    Meds: epoetin cortney-epbx (RETACRIT) Injectable 71843 Unit(s) SubCutaneous every 7 days  fluconAZOLE IVPB 400 milliGRAM(s) IV Intermittent every 24 hours  meropenem  IVPB      meropenem  IVPB 1000 milliGRAM(s) IV Intermittent every 12 hours        ENDOCRINE  CAPILLARY BLOOD GLUCOSE      POCT Blood Glucose.: 136 mg/dL (26 Jul 2022 21:18)  POCT Blood Glucose.: 228 mg/dL (26 Jul 2022 17:08)  POCT Blood Glucose.: 197 mg/dL (26 Jul 2022 12:13)  POCT Blood Glucose.: 139 mg/dL (26 Jul 2022 08:22)    Meds: finasteride 5 milliGRAM(s) Oral daily  insulin glargine Injectable (LANTUS) 5 Unit(s) SubCutaneous at bedtime  insulin lispro (ADMELOG) corrective regimen sliding scale   SubCutaneous three times a day with meals  insulin lispro (ADMELOG) corrective regimen sliding scale   SubCutaneous at bedtime  insulin lispro Injectable (ADMELOG) 4 Unit(s) SubCutaneous three times a day before meals  levothyroxine 50 MICROGram(s) Oral daily  predniSONE   Tablet 5 milliGRAM(s) Oral daily        ACCESS DEVICES:  [ ] Peripheral IV  [ ] Central Venous Line	[ ] R	[ ] L	[ ] IJ	[ ] Fem	[ ] SC	Placed:   [ ] Arterial Line		[ ] R	[ ] L	[ ] Fem	[ ] Rad	[ ] Ax	Placed:   [ ] PICC:					[ ] Mediport  [ ] Urinary Catheter, Date Placed:   [x] Necessity of urinary, arterial, and venous catheters discussed    OTHER MEDICATIONS:  chlorhexidine 2% Cloths 1 Application(s) Topical <User Schedule>      CODE STATUS:      IMAGING:    Chest CT shows no evidence of pneumonia

## 2022-07-28 NOTE — PROGRESS NOTE ADULT - NSPROGADDITIONALINFOA_GEN_ALL_CORE
Greater than 25 minutes spent on total encounter; the necessity of the time spent during the encounter on this date of service was due to development of plan of care/coordination of care/glycemic control through review of labs, blood glucose values and vital signs.

## 2022-07-28 NOTE — PROGRESS NOTE ADULT - SUBJECTIVE AND OBJECTIVE BOX
Follow Up:      Interval History/ROS: Patient is a 65 yo  Male who presents with a chief complaint of Status Epilepticus (28 Jul 2022 10:04)    Seen and examined at bedside. S/p HD yesterday. Pt offers no acute complaints at this time. Fever curve improves.     REVIEW OF SYSTEMS  As noted in HPI    Allergies  No Known Allergies    ANTIMICROBIALS:    fluconAZOLE IVPB 400 every 24 hours  meropenem  IVPB    meropenem  IVPB 1000 every 12 hours    OTHER MEDS: MEDICATIONS  (STANDING):  carvedilol 25 every 12 hours  doxazosin 4 <User Schedule>  epoetin cortney-epbx (RETACRIT) Injectable 63267 every 7 days  finasteride 5 daily  insulin glargine Injectable (LANTUS) 5 at bedtime  insulin lispro (ADMELOG) corrective regimen sliding scale  three times a day with meals  insulin lispro (ADMELOG) corrective regimen sliding scale  at bedtime  insulin lispro Injectable (ADMELOG) 4 three times a day before meals  levothyroxine 50 daily  melatonin 6 at bedtime  NIFEdipine XL 60 daily  pantoprazole    Tablet 40 daily  phenytoin   Capsule 100 every 8 hours  polyethylene glycol 3350 17 daily  predniSONE   Tablet 5 daily  senna 2 at bedtime    Vital Signs Last 24 Hrs  T(F): 100 (07-28-22 @ 08:00), Max: 101.8 (07-26-22 @ 03:00)    Vital Signs Last 24 Hrs  HR: 58 (07-28-22 @ 09:00) (52 - 59)  BP: 134/60 (07-28-22 @ 09:00) (95/51 - 190/93)  RR: 23 (07-28-22 @ 09:00)  SpO2: 96% (07-28-22 @ 09:00) (94% - 98%)  Wt(kg): --    EXAM:  General: Alert and Awake, NAD  Cardiac: RRR, No M/R/G  Resp: CTAB, No Wh/Rh/Ra. L chest HD catheter in place   Abdomen: NBS, NT/ND, No HSM, No guarding  MSK: No LE edema. No Calf tenderness  : Miranda  Skin: No rashes or lesions. Skin is warm and dry to the touch.   Neuro: Alert and Awake. CN 2-12 Grossly intact. Moves all four extremities spontaneously.  Psych: Calm, Pleasant, Cooperative    Labs:                        7.6    2.88  )-----------( 370      ( 28 Jul 2022 00:55 )             23.0     07-28    133<L>  |  96  |  67<H>  ----------------------------<  149<H>  4.8   |  22  |  2.57<H>    Ca    8.2<L>      28 Jul 2022 00:55  Phos  4.2     07-28  Mg     1.9     07-28    TPro  6.2  /  Alb  2.5<L>  /  TBili  0.4  /  DBili  x   /  AST  122<H>  /  ALT  256<H>  /  AlkPhos  287<H>  07-28    WBC Trend:  WBC Count: 2.88 (07-28-22 @ 00:55)  WBC Count: 2.82 (07-27-22 @ 22:17)  WBC Count: 2.97 (07-27-22 @ 13:00)  WBC Count: 3.06 (07-26-22 @ 21:51)    Creatine Trend:  Creatinine, Serum: 2.57 (07-28)  Creatinine, Serum: 2.24 (07-27)  Creatinine, Serum: 3.77 (07-27)  Creatinine, Serum: 3.34 (07-26)    Liver Biochemical Testing Trend:  Alanine Aminotransferase (ALT/SGPT): 256 *H* (07-28)  Alanine Aminotransferase (ALT/SGPT): 259 *H* (07-27)  Alanine Aminotransferase (ALT/SGPT): 293 *H* (07-27)  Alanine Aminotransferase (ALT/SGPT): 336 *H* (07-26)  Alanine Aminotransferase (ALT/SGPT): 364 *H* (07-26)  Aspartate Aminotransferase (AST/SGOT): 122 (07-28-22 @ 00:55)  Aspartate Aminotransferase (AST/SGOT): 110 (07-27-22 @ 22:17)  Aspartate Aminotransferase (AST/SGOT): 136 (07-27-22 @ 13:00)  Aspartate Aminotransferase (AST/SGOT): 144 (07-26-22 @ 21:51)  Aspartate Aminotransferase (AST/SGOT): 167 (07-26-22 @ 14:45)  Bilirubin Total, Serum: 0.4 (07-28)  Bilirubin Total, Serum: 0.4 (07-27)  Bilirubin Direct, Serum: 0.2 (07-27)  Bilirubin Total, Serum: 0.4 (07-27)  Bilirubin Total, Serum: 0.4 (07-26)    Trend LDH  07-25-22 @ 06:30  655<H>  06-11-22 @ 18:47  223  05-02-22 @ 10:05  246<H>  04-30-22 @ 07:15  260<H>  02-03-22 @ 18:58  179    MICROBIOLOGY:  MRSA PCR Result.: NotDetec (07-26-22 @ 03:47)  MRSA PCR Result.: NotDetec (07-13-22 @ 21:02)  MRSA PCR Result.: NotDetec (06-26-22 @ 11:25)    Culture - Blood (collected 27 Jul 2022 04:34)  Source: .Blood Blood-Peripheral  Preliminary Report:    No growth to date.    Culture - Blood (collected 27 Jul 2022 04:34)  Source: .Blood Blood-Peripheral  Preliminary Report:    No growth to date.    Culture - Urine (collected 25 Jul 2022 18:27)  Source: Catheterized Catheterized  Final Report:    50,000 - 99,000 CFU/mL Klebsiella pneumoniae ESBL  Organism: Klebsiella pneumoniae ESBL  Organism: Klebsiella pneumoniae ESBL    Sensitivities:      -  Amikacin: S <=16      -  Amoxicillin/Clavulanic Acid: S <=8/4      -  Ampicillin: R >16 These ampicillin results predict results for amoxicillin      -  Ampicillin/Sulbactam: I 16/8 Enterobacter, Klebsiella aerogenes, Citrobacter, and Serratia may develop resistance during prolonged therapy (3-4 days)      -  Aztreonam: R 16      -  Cefazolin: R >16 (MIC_CL_COM_ENTERIC_CEFAZU) For uncomplicated UTI with K. pneumoniae, E. coli, or P. mirablis: TALITA <=16 is sensitive and TALITA >=32 is resistant. This also predicts results for oral agents cefaclor, cefdinir, cefpodoxime, cefprozil, cefuroxime axetil, cephalexin and locarbef for uncomplicated UTI. Note that some isolates may be susceptible to these agents while testing resistant to cefazolin.      -  Cefepime: R 8      -  Ceftriaxone: R >32 Enterobacter, Klebsiella aerogenes, Citrobacter, and Serratia may develop resistance during prolonged therapy      -  Ciprofloxacin: R 1      -  Ertapenem: S <=0.5      -  Gentamicin: R >8      -  Imipenem: S <=1      -  Levofloxacin: I 1      -  Meropenem: S <=1      -  Nitrofurantoin: S <=32 Should not be used to treat pyelonephritis      -  Piperacillin/Tazobactam: S <=8      -  Tigecycline: S <=2      -  Tobramycin: I 8      -  Trimethoprim/Sulfamethoxazole: R >2/38      Method Type: TALITA    Culture - Blood (collected 25 Jul 2022 14:28)  Source: .Blood Blood  Preliminary Report:    No growth to date.    Culture - Blood (collected 25 Jul 2022 11:59)  Source: .Blood Blood  Preliminary Report:    No growth to date.    Culture - Blood (collected 15 Jul 2022 08:36)  Source: .Blood Blood  Final Report:    No Growth Final    Culture - Blood (collected 15 Jul 2022 07:15)  Source: .Blood Blood  Final Report:    No Growth Final    Culture - Urine (collected 13 Jul 2022 00:58)  Source: Clean Catch Clean Catch (Midstream)  Final Report:    50,000 - 99,000 CFU/mL Klebsiella pneumoniae ESBL  Organism: Klebsiella pneumoniae ESBL  Organism: Klebsiella pneumoniae ESBL    Sensitivities:      -  Amikacin: S <=16      -  Amoxicillin/Clavulanic Acid: S <=8/4 Consider reserving for cystitis when ampicillin/sulbactam is resistant      -  Ampicillin: R >16 These ampicillin results predict results for amoxicillin      -  Ampicillin/Sulbactam: R >16/8 Enterobacter, Klebsiella aerogenes, Citrobacter, and Serratia may develop resistance during prolonged therapy (3-4 days)      -  Aztreonam: R >16      -  Cefazolin: R >16 (MIC_CL_COM_ENTERIC_CEFAZU) For uncomplicated UTI with K. pneumoniae, E. coli, or P. mirablis: TALITA <=16 is sensitive and TALITA >=32 is resistant. This also predicts results for oral agents cefaclor, cefdinir, cefpodoxime, cefprozil, cefuroxime axetil, cephalexin and locarbef for uncomplicated UTI. Note that some isolates may be susceptible to these agents while testing resistant to cefazolin.      -  Cefepime: R >16      -  Ceftriaxone: R >32 Enterobacter, Klebsiella aerogenes, Citrobacter, and Serratia may develop resistance during prolonged therapy      -  Ciprofloxacin: R 1      -  Ertapenem: S <=0.5      -  Gentamicin: R >8      -  Imipenem: S <=1      -  Levofloxacin: S <=0.5      -  Meropenem: S <=1      -  Nitrofurantoin: R >64 Should not be used to treat pyelonephritis      -  Piperacillin/Tazobactam: S <=8      -  Tigecycline: S <=2      -  Tobramycin: I 8      -  Trimethoprim/Sulfamethoxazole: R >2/38      Method Type: TALITA    Culture - Fungal, CSF (collected 29 Jun 2022 13:09)  Source: .CSF CSF  Preliminary Report:    No growth    Culture - CSF with Gram Stain (collected 29 Jun 2022 13:09)  Source: .CSF CSF  Final Report:    No growth    HIV-1/2 Combo Result: Nonreact (04-21-22 @ 14:47)  HIV-1/2 Combo Result: Nonreact (08-09-20 @ 19:26)    Cytomegalovirus By PCR:   NotDetec (07-22-22 @ 06:18)    Fungitell: <31 pg/mL (07-25 @ 18:32)  Rapid RVP Result: NotDetec (07-25 @ 15:15)    COVID-19 PCR: NotDetec (07-25-22 @ 11:51)  COVID-19 PCR: NotDetec (07-23-22 @ 07:55)  COVID-19 PCR: NotDetec (07-17-22 @ 07:54)  COVID-19 PCR: NotDetec (07-10-22 @ 16:11)  COVID-19 PCR: NotDetec (07-05-22 @ 06:03)  COVID-19 PCR: NotDetec (06-23-22 @ 07:01)  COVID-19 PCR: NotDetec (06-14-22 @ 12:44)    Procalcitonin, Serum: 1.50 (07-28)  Procalcitonin, Serum: 1.51 (07-27)  Procalcitonin, Serum: 1.67 (07-26)  Procalcitonin, Serum: 0.88 (07-25)    Ferritin, Serum: 6336 (07-27)    Lactate Dehydrogenase, Serum: 655 (07-25)    Blood Gas Arterial, Lactate: 1.5 (07-25 @ 19:00)  Blood Gas Venous - Lactate: 1.4 (07-25 @ 18:07)      RADIOLOGY:  imaging below personally reviewed    < from: US Trans Kidney w/ Doppler, Right (07.27.22 @ 11:39) >  IMPRESSION:    Mild urothelial thickening of collecting system and ureter.  There are slightly increased resistive indices with rounded upstroke   waveform, since the prior exam. Renal vein is patent.  Small amount of free fluid in right lower quadrant and around kidney.    --- End of Report ---    < end of copied text >

## 2022-07-28 NOTE — PROGRESS NOTE ADULT - SUBJECTIVE AND OBJECTIVE BOX
Chief Complaint: DM 2    History: Patient seen at bedside in ICU. Patient with low PO intake, primarily having oral supplement (Nepro) per family member. BG trending within target range, no hypoglycemia   Remains on Prednisone 5 mg daily    MEDICATIONS  (STANDING):  carvedilol 25 milliGRAM(s) Oral every 12 hours  chlorhexidine 2% Cloths 1 Application(s) Topical <User Schedule>  doxazosin 4 milliGRAM(s) Oral <User Schedule>  epoetin cortney-epbx (RETACRIT) Injectable 04702 Unit(s) SubCutaneous every 7 days  finasteride 5 milliGRAM(s) Oral daily  fluconAZOLE IVPB 400 milliGRAM(s) IV Intermittent every 24 hours  insulin glargine Injectable (LANTUS) 5 Unit(s) SubCutaneous at bedtime  insulin lispro (ADMELOG) corrective regimen sliding scale   SubCutaneous three times a day with meals  insulin lispro (ADMELOG) corrective regimen sliding scale   SubCutaneous at bedtime  insulin lispro Injectable (ADMELOG) 4 Unit(s) SubCutaneous three times a day before meals  levothyroxine 50 MICROGram(s) Oral daily  melatonin 6 milliGRAM(s) Oral at bedtime  meropenem  IVPB      meropenem  IVPB 1000 milliGRAM(s) IV Intermittent every 12 hours  NIFEdipine XL 60 milliGRAM(s) Oral daily  pantoprazole    Tablet 40 milliGRAM(s) Oral daily  phenytoin   Capsule 100 milliGRAM(s) Oral every 8 hours  polyethylene glycol 3350 17 Gram(s) Oral daily  predniSONE   Tablet 5 milliGRAM(s) Oral daily  senna 2 Tablet(s) Oral at bedtime  sodium bicarbonate 1300 milliGRAM(s) Oral <User Schedule>    No Known Allergies    Review of Systems:  Cardiovascular: No chest pain  Respiratory: No SOB  GI: No nausea, vomiting  Endocrine: no hypoglycemia     PHYSICAL EXAM:  VITALS: T(C): 37.8 (07-28-22 @ 12:00)  T(F): 100 (07-28-22 @ 12:00), Max: 100.4 (07-28-22 @ 06:47)  HR: 60 (07-28-22 @ 13:00) (52 - 60)  BP: 123/60 (07-28-22 @ 13:00) (95/51 - 190/93)  RR:  (14 - 28)  SpO2:  (94% - 98%)  Wt(kg): --  GENERAL: NAD  EYES: No proptosis, no lid lag, anicteric  HEENT:  Atraumatic, Normocephalic, moist mucous membranes  RESPIRATORY: unlabored respirations     CAPILLARY BLOOD GLUCOSE    POCT Blood Glucose.: 136 mg/dL (28 Jul 2022 12:44)  POCT Blood Glucose.: 122 mg/dL (28 Jul 2022 07:37)  POCT Blood Glucose.: 145 mg/dL (27 Jul 2022 22:00)  POCT Blood Glucose.: 107 mg/dL (27 Jul 2022 17:15)      07-28    133<L>  |  96  |  67<H>  ----------------------------<  149<H>  4.8   |  22  |  2.57<H>    eGFR: 27<L>    Ca    8.2<L>      07-28  Mg     1.9     07-28  Phos  4.2     07-28    TPro  6.2  /  Alb  2.5<L>  /  TBili  0.4  /  DBili  x   /  AST  122<H>  /  ALT  256<H>  /  AlkPhos  287<H>  07-28      Thyroid Function Tests:  07-06 @ 18:36 TSH 4.69 FreeT4 -- T3 43 Anti TPO -- Anti Thyroglobulin Ab -- TSI --      A1C with Estimated Average Glucose Result: 6.0 % (06-30-22 @ 05:49)  A1C with Estimated Average Glucose Result: 6.6 % (04-24-22 @ 17:12)  A1C with Estimated Average Glucose Result: 7.3 % (02-04-22 @ 13:42)  A1C with Estimated Average Glucose Result: 7.2 % (02-04-22 @ 05:48)    Diet, Soft and Bite Sized:   Consistent Carbohydrate Evening Snack (CSTCHOSN)  For patients receiving Renal Replacement - No Protein Restr, No Conc K, No Conc Phos, Low Sodium (RENAL)  Supplement Feeding Modality:  Oral  Ensure Clear Cans or Servings Per Day:  3       Frequency:  Three Times a day (07-28-22 @ 12:57) [Active]

## 2022-07-28 NOTE — PROGRESS NOTE ADULT - SUBJECTIVE AND OBJECTIVE BOX
Transplant Surgery Multidisciplinary Progress Note   --------------------------------------------------------------  R DCD DDRT    4/21/22    Present:  Patient seen and examined with multidisciplinary team including Transplant Surgeon: Dr. Tena, Transplant Nephrologist: Dr. Alfred, Nephrology Fellow Dr. Thompson, Transplant Pharmacist Michelle Rahman, WALDO Esparza and unit RN during am rounds.  Disciplines not in attendance will be notified of the plan.    HPI: 66M with PMH: DM type II, HTN, CAD s/p CABG in 2020, AFib on Eliquis, CVA in 2019 due to Afib, Seizure d/o following CVA, last episode was on 4/8/22, h/o GIB in 2/2022 EGD with duodenal ulcer.  ESRD due to DM was on HD since 2019.     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  Donor was 58 , KDPI 82%, DCD, single vessels and ureter, HLA mismatch 1, 2, 2. No DSA, cPRA 0%. 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 Re-admitted in May for anemia in the setting of large perinephric hematoma s/p evacuation and repair of arterial anastomosis on 5/2/22. Intra operative biopsy showed no rejection, Creatinine ranging ~2mg/dL.    In Rehab:  He was recently dx with klebsiella UTI rx with ertapenem - then switched to Levaquin day #6.    He also had parainfluenza pneumonia. Was at rehab progressing well.      Hospital course:  - 6/10: transferred from rehab facility for seizure status epilepticus. Intubated for respiratory protection and transferred to MICU.  EEG showed no seizure activity. Neuro consulted.   - 6/11: Extubated. Found to have R pleural effusion. s/p Thoracentesis 1.3L. Eliquis changed to heparin drip.  Post procedure drop in H&H. 5u PRBC, 2 u FFP.   - 6/11 evening: Transferred to SICU from MICU for further care in setting of hypoxia, significant R pleural effusion, anemia and hemodynamic instability  - 6/11 Intubated at 9pm and sedated on Precedex.  CT placed, 600ml blood drained.  1L total overnight, started pressors  - 6/11 Received Protamine for PTT >100 (was on Heparin drip started for AF), 2 u FFP and 5u PRBC total  - 6/13 s/p VATS 2.2L old blood removed; second chest tube placed  - 6/18-19: chest tubes removed  - 6/21: ?PRES vs. chronic ischemic changes on MRI, off Envarsus, switched to Belatacept 6/23 with good graft function  - 6/25: Tx to SICU for refractory seizures, improved with IV antiepileptic regimen  - 7/10: Passed bedside S&S in SICU. Downgraded from SICU to floor.   - 7/11: OR-->URETERAL STENT REMOVED  - 7/15: ESBL Kleb UTI (50-90K), completed Ertapenem x 3 Days  - 7/25: Tx to SICU for Sepsis/ elevated LFTS    Interval Events:  - Low grade fever 38.4  - s/p HD yesterday, no fluid removed   - Renal US:  no hydro   - Urine cxs: kleb pneumo, bld cxs NGTD    Immunosuppression:   Induction:  Thymoglobulin                                        Maintenance:  - Belatacept: 6/23, 7/4, 7/8, 7/18. Next dose 8/1  - MMF held (was on 500 BID leukopenia), Pred 5mg daily   - Ongoing monitoring for signs of rejection.    Potential Discharge date: pending clinical improvement   Education:  Medications  Plan of care:  See Below    MEDICATIONS  (STANDING):  carvedilol 25 milliGRAM(s) Oral every 12 hours  chlorhexidine 2% Cloths 1 Application(s) Topical <User Schedule>  doxazosin 4 milliGRAM(s) Oral <User Schedule>  epoetin cortney-epbx (RETACRIT) Injectable 92545 Unit(s) SubCutaneous every 7 days  ertapenem  IVPB 500 milliGRAM(s) IV Intermittent every 24 hours  finasteride 5 milliGRAM(s) Oral daily  fluconAZOLE IVPB 400 milliGRAM(s) IV Intermittent every 24 hours  insulin glargine Injectable (LANTUS) 5 Unit(s) SubCutaneous at bedtime  insulin lispro (ADMELOG) corrective regimen sliding scale   SubCutaneous three times a day with meals  insulin lispro (ADMELOG) corrective regimen sliding scale   SubCutaneous at bedtime  insulin lispro Injectable (ADMELOG) 4 Unit(s) SubCutaneous three times a day before meals  levothyroxine 50 MICROGram(s) Oral daily  melatonin 6 milliGRAM(s) Oral at bedtime  NIFEdipine XL 60 milliGRAM(s) Oral daily  pantoprazole    Tablet 40 milliGRAM(s) Oral daily  phenytoin   Capsule 100 milliGRAM(s) Oral every 8 hours  polyethylene glycol 3350 17 Gram(s) Oral daily  predniSONE   Tablet 5 milliGRAM(s) Oral daily  senna 2 Tablet(s) Oral at bedtime  sodium bicarbonate 1300 milliGRAM(s) Oral <User Schedule>    PAST MEDICAL & SURGICAL HISTORY:  Hypertension  Diabetes  Dyslipidemia  CAD (Coronary Artery Disease)  with Stents in 06/2009 and 6/2019, s/p off-pump C3L on 8/13/20  Hypothyroidism  CVA (cerebral vascular accident)  12/13/19 with residual bilateral weakness  Anemia  PEG (percutaneous endoscopic gastrostomy) status  removed July 2020  Intubation of airway performed without difficulty  Dec 2019  CVA c/b status epilepticus requiring intubation and PEG in 12/2019-  ESRD on dialysis  M/W/F  Seizures  after CVA, last seizure was last week 4/07/22  History of insertion of stent into coronary artery bypass graft  2009 and 2019  S/P CABG x 3  off pump C3L on 8/13/20    Vital Signs Last 24 Hrs  T(C): 37.8 (28 Jul 2022 12:00), Max: 38 (28 Jul 2022 06:47)  T(F): 100 (28 Jul 2022 12:00), Max: 100.4 (28 Jul 2022 06:47)  HR: 62 (28 Jul 2022 14:00) (52 - 62)  BP: 136/62 (28 Jul 2022 14:00) (109/63 - 190/93)  BP(mean): 89 (28 Jul 2022 14:00) (80 - 132)  RR: 19 (28 Jul 2022 14:00) (14 - 28)  SpO2: 97% (28 Jul 2022 14:00) (94% - 98%)    I&O's Summary    27 Jul 2022 07:01  -  28 Jul 2022 07:00  --------------------------------------------------------  IN: 1450 mL / OUT: 114 mL / NET: 1336 mL    28 Jul 2022 07:01  -  28 Jul 2022 16:03  --------------------------------------------------------  IN: 780 mL / OUT: 40 mL / NET: 740 mL                        7.6    2.88  )-----------( 370      ( 28 Jul 2022 00:55 )             23.0     07-28    133<L>  |  96  |  67<H>  ----------------------------<  149<H>  4.8   |  22  |  2.57<H>    Ca    8.2<L>      28 Jul 2022 00:55  Phos  4.2     07-28  Mg     1.9     07-28    TPro  6.2  /  Alb  2.5<L>  /  TBili  0.4  /  DBili  x   /  AST  122<H>  /  ALT  256<H>  /  AlkPhos  287<H>  07-28    Culture - Blood (collected 07-27-22 @ 04:34)  Source: .Blood Blood-Peripheral  Preliminary Report (07-28-22 @ 07:01):    No growth to date.    Culture - Blood (collected 07-27-22 @ 04:34)  Source: .Blood Blood-Peripheral  Preliminary Report (07-28-22 @ 07:01):    No growth to date.    Culture - Fungal, Blood (collected 07-27-22 @ 04:34)  Source: .Blood Blood  Preliminary Report (07-28-22 @ 10:47):    Testing in progress    Culture - Urine (collected 07-25-22 @ 18:27)  Source: Catheterized Catheterized  Final Report (07-28-22 @ 09:06):    50,000 - 99,000 CFU/mL Klebsiella pneumoniae ESBL  Organism: Klebsiella pneumoniae ESBL (07-28-22 @ 09:06)  Organism: Klebsiella pneumoniae ESBL (07-28-22 @ 09:06)    Culture - Blood (collected 07-25-22 @ 14:28)  Source: .Blood Blood  Preliminary Report (07-26-22 @ 22:01):    No growth to date.    Culture - Blood (collected 07-25-22 @ 11:59)  Source: .Blood Blood  Preliminary Report (07-26-22 @ 15:02):    No growth to date.      REVIEW OF SYSTEMS  ------------------------------------  unable to obtain     PHYSICAL EXAM:  Constitutional: Frail looking   Eyes: Anicteric, PERRLA  ENMT: nc/at,   Neck: supple  Respiratory: CTA   Cardiovascular: RRR  Gastrointestinal: Soft, non distended, NT, RLQ incision healed   Genitourinary: tucker insitu   Extremities: SCD's in place and working bilaterally, overall edema has improved  Vascular: Palpable dp pulses bilaterally  Neurological: AAOx3; sleepy/ arousable   Skin: no rashes, ulcerations or lesions  Musculoskeletal: Moving all extremities, global weakness  Psychiatric: calm, follows commands and interacts appropriately

## 2022-07-28 NOTE — PROGRESS NOTE ADULT - SUBJECTIVE AND OBJECTIVE BOX
[facility] DIVISION OF KIDNEY DISEASES AND HYPERTENSION   FOLLOW UP NOTE    --------------------------------------------------------------------------------  HPI: 66 yr old man with h/o ESRD due to DM on HD since 2019, s/p DDRT from DCD donor on 4/21/22 complicated by DGF requiring HD until 4/29/22.   PMH: DM type II, HTN, CAD s/p CABG in 2020, Afib on Eliquis, CVA in 2019 due to Afib, Seizure d/o following CVA, h/o GI bleed in 2/2022 EGD with duodenal ulcer.  Donor was 58 , KDPI 82%, DCD, single vessels and ureter, HLA mismatch 1, 2, 2. No DSA, cPRA 0%. CMV +/+    Hospitalized May 2022 for anemia in the setting of large perinephric hematoma s/p evacuation and repair of arterial anastomosis on 5/2/22. Intra operative biopsy showed no rejection. H/o seizure d/o with last seizure episode prior to transplant was on 4/8/22. He is readmitted for status epilepticus in the setting of sub therapeutic valproic acid levels. Course complicated by respiratory failure, hemothorax and hemorrhagic shock following right thoracentesis done on 6/10 for effusion. S/p PRBC x 5 units and vasopressors, chest tube, followed by VATS.    Envarsus discontinued and started on belatacept due to concern for neurotoxicity. Allograft function is stable, mental status is slowly improving. He is on fosphenytoin with good control of seizures. Renal sonogram done on 7/19 showed no hydronephrosis. MARA was not detected on US. DSA sent on 7/19/22 were negative.    Pt was noted to have worsening renal function with SCr peaked to 2.13. Other labs significant for elevated LFTs and SNa of 128. Pt also developed fevers and has persistent luecopenia. Pt transferred to SICU on 7/25/22 for closer monitoring. CT /p negative for any collections/abscess. No hydronephrosis noted on renal US. UA done on 7/25/22 showed LE and pyuria. Pt overnight on 7/27/22 continues to spike fevers with Tmax of 38.4 C and decreasing urine output. Also noted to have worsening Sco2 and was started on bicarbonate infusion. Sco2 remains low at 14 on 7/27/22. Pt was started on IV antibiotics and antifungal. Left nontunneled IJ catheter was placed and underwent session of HD on 7/27/22.     Pt. was seen and examined today, reports slightly better. Pt continues to remain oliguric.       PAST HISTORY  --------------------------------------------------------------------------------  No significant changes to PMH, PSH, FHx, SHx, unless otherwise noted    ALLERGIES & MEDICATIONS  --------------------------------------------------------------------------------  Allergies    No Known Allergies    Intolerances      Standing Inpatient Medications  carvedilol 25 milliGRAM(s) Oral every 12 hours  chlorhexidine 2% Cloths 1 Application(s) Topical <User Schedule>  doxazosin 4 milliGRAM(s) Oral <User Schedule>  epoetin cortney-epbx (RETACRIT) Injectable 39574 Unit(s) SubCutaneous every 7 days  finasteride 5 milliGRAM(s) Oral daily  fluconAZOLE IVPB 400 milliGRAM(s) IV Intermittent every 24 hours  insulin glargine Injectable (LANTUS) 5 Unit(s) SubCutaneous at bedtime  insulin lispro (ADMELOG) corrective regimen sliding scale   SubCutaneous three times a day with meals  insulin lispro (ADMELOG) corrective regimen sliding scale   SubCutaneous at bedtime  insulin lispro Injectable (ADMELOG) 4 Unit(s) SubCutaneous three times a day before meals  levothyroxine 50 MICROGram(s) Oral daily  melatonin 6 milliGRAM(s) Oral at bedtime  meropenem  IVPB      meropenem  IVPB 1000 milliGRAM(s) IV Intermittent every 12 hours  NIFEdipine XL 60 milliGRAM(s) Oral daily  pantoprazole    Tablet 40 milliGRAM(s) Oral daily  phenytoin   Capsule 100 milliGRAM(s) Oral every 8 hours  polyethylene glycol 3350 17 Gram(s) Oral daily  predniSONE   Tablet 5 milliGRAM(s) Oral daily  senna 2 Tablet(s) Oral at bedtime  sodium bicarbonate 1300 milliGRAM(s) Oral <User Schedule>    PRN Inpatient Medications      REVIEW OF SYSTEMS  --------------------------------------------------------------------------------  Gen: fevers/chills+  Head/Eyes/Ears: No HA   Respiratory: No dyspnea, cough  CV: No chest pain  GI: No abdominal pain, diarrhea  : as per HPI  MSK: LE edema+  Skin: No rashes  Heme: as per HPI    All other systems were reviewed and are negative, except as noted.    VITALS/PHYSICAL EXAM  --------------------------------------------------------------------------------  T(C): 37.8 (07-28-22 @ 08:00), Max: 38 (07-28-22 @ 06:47)  HR: 58 (07-28-22 @ 09:00) (52 - 59)  BP: 134/60 (07-28-22 @ 09:00) (95/51 - 190/93)  RR: 23 (07-28-22 @ 09:00) (14 - 28)  SpO2: 96% (07-28-22 @ 09:00) (94% - 98%)  Wt(kg): --      07-27-22 @ 07:01  -  07-28-22 @ 07:00  --------------------------------------------------------  IN: 1450 mL / OUT: 114 mL / NET: 1336 mL    07-28-22 @ 07:01  -  07-28-22 @ 12:12  --------------------------------------------------------  IN: 300 mL / OUT: 5 mL / NET: 295 mL      Physical Exam:  	Gen: weak appearing elderly male  	HEENT: Anicteric  	Pulm: CTA B/L  	CV: S1S2+  	Abd: Soft, +BS               Transplant site: RLQ non tender, well healed surgical scar+  	Ext: LE edema B/L+, LUE edema+  	Neuro: Awake and somnolent  	Skin: Warm and dry  	Dialysis access: left nontunneled HD catheter covered with dressing      LABS/STUDIES  --------------------------------------------------------------------------------              7.6    2.88  >-----------<  370      [07-28-22 @ 00:55]              23.0     133  |  96  |  67  ----------------------------<  149      [07-28-22 @ 00:55]  4.8   |  22  |  2.57        Ca     8.2     [07-28-22 @ 00:55]      Mg     1.9     [07-28-22 @ 00:55]      Phos  4.2     [07-28-22 @ 00:55]    Creatinine Trend:  SCr 2.57 [07-28 @ 00:55]  SCr 2.24 [07-27 @ 22:17]  SCr 3.77 [07-27 @ 13:00]  SCr 3.34 [07-26 @ 21:51]  SCr 3.17 [07-26 @ 14:45]    Urinalysis - [07-25-22 @ 18:28]      Color Yellow / Appearance Slightly Turbid / SG 1.020 / pH 5.5      Gluc Negative / Ketone Negative  / Bili Negative / Urobili Negative       Blood Small / Protein 100 / Leuk Est Large / Nitrite Negative      RBC 2 / WBC 40 / Hyaline 5 / Gran  / Sq Epi  / Non Sq Epi 0 / Bacteria Many    Urine Creatinine 129      [07-26-22 @ 04:17]  Urine Sodium 20      [07-26-22 @ 04:17]  Urine Osmolality 387      [07-26-22 @ 04:17]    HBsAg Nonreact      [07-25-22 @ 11:51]  HCV 0.18, Nonreact      [07-25-22 @ 11:51]    CMV PCRCMVPCR Log: NotDetec Lxs73YP/mL (07-22 @ 06:18)  CMVPCR Log: NotDetec Oku49EV/mL (06-26 @ 18:14)  CMVPCR Log: NotDetec Vwb05HM/mL (06-25 @ 14:08)    Parvo PCRParvovirus B19 DNA by PCR: NotDetec IU/mL (07-25 @ 18:32)  Parvovirus B19 DNA by PCR: NotDetec IU/mL (06-26 @ 18:14)

## 2022-07-28 NOTE — PROGRESS NOTE ADULT - ATTENDING COMMENTS
Renal transplant recipient with prolonged admission  Now with fevers and transaminitis  CT C/A/P without obvious infectious focus  UCx with ESBL Klebsiella  Viral PCR's so far negative  ?Transplant pyelonephritis as etiology of high grade / persistent fevers  Would deescalate Meropenem to Ertapenem    I will continue to follow. Please feel free to contact me with any further questions.    Carlton Hoover M.D.  [facility] Division of Infectious Disease  8AM-5PM Monday - Friday: Available on [employer] Teams  After Hours and Holidays (or if no response on [employer] Teams): Please contact the Infectious Diseases Office at [phone]    The above assessment and plan were discussed with Kirstie, Transplant surgery PA

## 2022-07-28 NOTE — PROGRESS NOTE ADULT - ASSESSMENT
66 yr old Male with PMHx ESRD s/p permacath removal 5/10/22 s/p Rt DDRT 4/21/22,  CVA (2019), Afib on apixaban, seizure activity, CAD s/p stents, CABG (2020), HTN, HLD, DM on insulin, gastric/duodenal ulcer, recent hospitalization 4/28/22 -5/10/22 with weakness/anemia found to have perinephric hematoma requiring evacuation and repair of bleeding arterial anastomosis. Curently being treated for UTI with Levaquin Today after developing multiple sz episodes refractory to 5 mg versed IM (EMS) and 2 mg ativan IV in E.D. concerning for status epilepticus requiring intubation for hypoxic respiratory  failure. MICU Consult was called for hypoxic respiratory failure secondary to status epilepticus.  Nephrology consulted for renal failure.     A/P  DDRT on 04/21/22  Course complicated by DGF, was on HD until 4/29/22. Readmitted in May for anemia in the setting of large perinephric hematoma s/p evacuation and repair of arterial anastomosis on 5/2/22. Intra operative biopsy showed no rejection.  ANA was initially sec to  hemodynamic change   stent removal 07/12/22  scr trending up  Febrile, etiology?, being w/u by ID   No hydronephrosis noted on renal US. UA done on 7/25/22 showed LE and pyuria.   plan for allograft US today.  Follow BK PCR and adenovirus.   Follow urine and blood cultures.  follow fungal cultures.   Pt with worsening acidosis and oliguric   s/p HD 07/27/22;  HD per transplant team   Immunosuppression per transplant team  transplant team on board   allograft US, DSA negative   monitor BMP, U/O     HYponatremia   avoid hypotonic solution   optimize glucose   monitor BMP closely     HTN   optimal   manage by transplant team    monitor BP closely

## 2022-07-28 NOTE — PROGRESS NOTE ADULT - TIME BILLING
Kidney Transplant recipient with ANA and Oliguria  Fever under evaluation, current major consideration is infectious etiology likely from urinary tract  Abnormal LFT  , Anemia    Creatinine trend noted  Comorbidities reviewed.  Patient seen, examined and reviewed available clinical and lab data and imaging data  Reviewed immunosuppression and allograft function including urine out put, creatinine trend, urine studies and imaging.  On minimized regimen. Renee, steroids, MMF on hold  Reviewed medication regimen including antibiotic, antifungal for comorbidities  Suggestions:  1. f/u repeat Blood culture; urine culture noted  2. Will monitor for dialysis need  3. If remains oliguric transplant kidney sonogram  4. Transplant ID follow up  I was present during and reviewed clinical and lab data as well as assessment and plan as documented by the house staff as noted. Please contact if any additional questions with any change in clinical condition or on availability of any additional information or reports.

## 2022-07-28 NOTE — PROGRESS NOTE ADULT - ASSESSMENT
66 year old Male with PMHx ESRD s/p PermCath removal 5/10/22 s/p Rt DDRT 4/21/22,  CVA (2019), Afib on apixaban, seizure activity, CAD s/p stents, CABG (2020), HTN, HLD, DM on insulin, gastric/duodenal ulcer, recent hospitalization 4/28/22 -5/10/22 with weakness/anemia found to have perinephric hematoma requiring evacuation and repair of bleeding arterial anastomosis who presented to [facility] for status epilepticus requiring intubation for hypoxic respiratory failure.     Subsequently developed continued seizures and hypothermia -> resolved   Was initiated on empiric antibiotics  s/p LP which was not suggestive of infectious process  Blood, urine and sputum cultures without positive sample    U/A (7/12) 161 WBC  UCx (7/13) 50-99K ESBL Klebsiella   s/p 3 days of Ertapenem    UA (7/25) with 40 WBC  UCx (7/25) 50-99k ESBL Klebsiella     Transaminitis trending down   Fever curve improved   S/p HD via L chest HD cath     RUQ (7/25) with hepatomegaly   CT c/a/p (7/25) with only small perinephric fluid collection, small volume ascites.  Doppler US R Kidney (7/27) with mild thickening of collecting system and ureter and small     RVP (7/25) Negative  CMV PCR (7/22) Negative  EBV PCR (7/25) Negative   HBV PCR (7/25) Negative   HHV-6 PCR (7/25) POSITIVE   Parvovirus IgM and PCR (7/26) Negative   HSV 1/2 PCR (7/26) Negative   Adenovirus PCR (7/26) Negative    #Fever, Transaminitis, Renal Transplant Recipient  --S/p Vancomycin 1g x1 07/25 -> no further Vancomycin  --Meropenem 1g IV Q12H, continue   --Doubt that we need Fluconazole at this point  --Continue to follow CBC with diff  --Continue to follow renal function (Cr/BUN)  --Continue to follow transaminases  --Continue to follow temperature curve  --Follow up on preliminary blood and urine cultures   66 year old Male with PMHx ESRD s/p PermCath removal 5/10/22 s/p Rt DDRT 4/21/22,  CVA (2019), Afib on apixaban, seizure activity, CAD s/p stents, CABG (2020), HTN, HLD, DM on insulin, gastric/duodenal ulcer, recent hospitalization 4/28/22 -5/10/22 with weakness/anemia found to have perinephric hematoma requiring evacuation and repair of bleeding arterial anastomosis who presented to [facility] for status epilepticus requiring intubation for hypoxic respiratory failure.     Subsequently developed continued seizures and hypothermia -> resolved   Was initiated on empiric antibiotics  s/p LP which was not suggestive of infectious process  Blood, urine and sputum cultures without positive sample    U/A (7/12) 161 WBC  UCx (7/13) 50-99K ESBL Klebsiella   s/p 3 days of Ertapenem    UA (7/25) with 40 WBC  UCx (7/25) 50-99k ESBL Klebsiella     Transaminitis trending down   Fever curve improved   S/p HD via L chest HD cath     RUQ (7/25) with hepatomegaly   CT c/a/p (7/25) with only small perinephric fluid collection, small volume ascites.  Doppler US R Kidney (7/27) with mild thickening of collecting system and ureter and small     RVP (7/25) Negative  CMV PCR (7/22) Negative  EBV PCR (7/25) Negative   HBV PCR (7/25) Negative   HHV-6 PCR (7/25) POSITIVE   Parvovirus IgM and PCR (7/26) Negative   HSV 1/2 PCR (7/26) Negative   Adenovirus PCR (7/26) Negative    #Fever, Transaminitis, Renal Transplant Recipient  --S/p Vancomycin 1g x1 07/25   --Meropenem 1g IV Q12H  --Doubt that we need Fluconazole at this point  --Continue to follow CBC with diff  --Continue to follow renal function (Cr/BUN)  --Continue to follow transaminases  --Continue to follow temperature curve  --Follow up on preliminary blood and urine cultures    Recommendations:   -De-escalate Merrem to Ertapenem and continue through 8/3 to complete a 10 day course for ESBL Klebsiella UTI/pyelonephritis   -Recommend discontinuation of Fluconazole     Case d/w Attending.   66 year old Male with PMHx ESRD s/p PermCath removal 5/10/22 s/p Rt DDRT 4/21/22,  CVA (2019), Afib on apixaban, seizure activity, CAD s/p stents, CABG (2020), HTN, HLD, DM on insulin, gastric/duodenal ulcer, recent hospitalization 4/28/22 -5/10/22 with weakness/anemia found to have perinephric hematoma requiring evacuation and repair of bleeding arterial anastomosis who presented to [facility] for status epilepticus requiring intubation for hypoxic respiratory failure.     Subsequently developed continued seizures and hypothermia -> resolved   Was initiated on empiric antibiotics  s/p LP which was not suggestive of infectious process  Blood, urine and sputum cultures without positive sample    U/A (7/12) 161 WBC  UCx (7/13) 50-99K ESBL Klebsiella   s/p 3 days of Ertapenem    UA (7/25) with 40 WBC  UCx (7/25) 50-99k ESBL Klebsiella     Transaminitis trending down   Fever curve improved   S/p HD via L chest HD cath     RUQ (7/25) with hepatomegaly   CT c/a/p (7/25) with only small perinephric fluid collection, small volume ascites.  Doppler US R Kidney (7/27) with mild thickening of collecting system and ureter and small     RVP (7/25) Negative  CMV PCR (7/22) Negative  EBV PCR (7/25) Negative   HBV PCR (7/25) Negative   HHV-6 PCR (7/25) POSITIVE   Parvovirus IgM and PCR (7/26) Negative   HSV 1/2 PCR (7/26) Negative   Adenovirus PCR (7/26) Negative    #Fever, Transaminitis, Renal Transplant Recipient  --S/p Vancomycin 1g x1 07/25   --De-escalate Meropenem to Ertapenem 500 mg IV Q24H and continue through 8/3 to complete a 10 day course for ESBL Klebsiella UTI / pyelonephritis  --Doubt that we need Fluconazole at this point  --Continue to follow CBC with diff  --Continue to follow transaminases  --Continue to follow temperature curve  --Follow up on preliminary blood     #Abnormal Laboratory Test (HHV6 PCR)  HHV6 PCR minimally elevated; likely chromosomal integration  --Monitor off of therapy for this indication    I will continue to follow. Please feel free to contact me with any further questions.    Carlton Hoover M.D.  [facility] Division of Infectious Disease  8AM-5PM Monday - Friday: Available on [employer] Teams  After Hours and Holidays (or if no response on [employer] Teams): Please contact the Infectious Diseases Office at [phone]    The above assessment and plan were discussed with Kirstie, transplant surgery PA

## 2022-07-28 NOTE — PROGRESS NOTE ADULT - SUBJECTIVE AND OBJECTIVE BOX
[facility]   MD NINA MASON MD, DO SADIE RAMIREZ NP    TEL:  OFFICE: [phone]    From 5pm-7am Answering Service [phone]    -- RENAL FOLLOW UP NOTE ---Date of Service 07-28-22 @ 13:44    Patient is a 66y old  Male who presents with a chief complaint of Status Epilepticus (28 Jul 2022 12:12)      Patient seen and examined in ICU.   VITALS:  T(F): 100 (07-28-22 @ 12:00), Max: 100.4 (07-28-22 @ 06:47)  HR: 60 (07-28-22 @ 13:00)  BP: 123/60 (07-28-22 @ 13:00)  RR: 22 (07-28-22 @ 13:00)  SpO2: 94% (07-28-22 @ 13:00)  Wt(kg): --    07-27 @ 07:01  -  07-28 @ 07:00  --------------------------------------------------------  IN: 1450 mL / OUT: 114 mL / NET: 1336 mL    07-28 @ 07:01  -  07-28 @ 13:44  --------------------------------------------------------  IN: 780 mL / OUT: 30 mL / NET: 750 mL          PHYSICAL EXAM:  Constitutional: NAD  Neck: No JVD  Respiratory: CTAB, no wheezes, rales or rhonchi  Cardiovascular: S1, S2, RRR  Gastrointestinal: BS+, soft, NT/ND  Extremities: + peripheral edema    Hospital Medications:   MEDICATIONS  (STANDING):  carvedilol 25 milliGRAM(s) Oral every 12 hours  chlorhexidine 2% Cloths 1 Application(s) Topical <User Schedule>  doxazosin 4 milliGRAM(s) Oral <User Schedule>  epoetin cortney-epbx (RETACRIT) Injectable 41598 Unit(s) SubCutaneous every 7 days  finasteride 5 milliGRAM(s) Oral daily  fluconAZOLE IVPB 400 milliGRAM(s) IV Intermittent every 24 hours  insulin glargine Injectable (LANTUS) 5 Unit(s) SubCutaneous at bedtime  insulin lispro (ADMELOG) corrective regimen sliding scale   SubCutaneous three times a day with meals  insulin lispro (ADMELOG) corrective regimen sliding scale   SubCutaneous at bedtime  insulin lispro Injectable (ADMELOG) 4 Unit(s) SubCutaneous three times a day before meals  levothyroxine 50 MICROGram(s) Oral daily  melatonin 6 milliGRAM(s) Oral at bedtime  meropenem  IVPB      meropenem  IVPB 1000 milliGRAM(s) IV Intermittent every 12 hours  NIFEdipine XL 60 milliGRAM(s) Oral daily  pantoprazole    Tablet 40 milliGRAM(s) Oral daily  phenytoin   Capsule 100 milliGRAM(s) Oral every 8 hours  polyethylene glycol 3350 17 Gram(s) Oral daily  predniSONE   Tablet 5 milliGRAM(s) Oral daily  senna 2 Tablet(s) Oral at bedtime  sodium bicarbonate 1300 milliGRAM(s) Oral <User Schedule>      LABS:  07-28    133<L>  |  96  |  67<H>  ----------------------------<  149<H>  4.8   |  22  |  2.57<H>    Ca    8.2<L>      28 Jul 2022 00:55  Phos  4.2     07-28  Mg     1.9     07-28    TPro  6.2  /  Alb  2.5<L>  /  TBili  0.4  /  DBili      /  AST  122<H>  /  ALT  256<H>  /  AlkPhos  287<H>  07-28    Creatinine Trend: 2.57 <--, 2.24 <--, 3.77 <--, 3.34 <--, 3.17 <--, 2.67 <--, 2.45 <--, 2.22 <--, 2.13 <--, 1.79 <--, 1.80 <--, 1.50 <--, 1.43 <--    Albumin, Serum: 2.5 g/dL (07-28 @ 00:55)  Phosphorus Level, Serum: 4.2 mg/dL (07-28 @ 00:55)  Albumin, Serum: 2.5 g/dL (07-27 @ 22:17)  Phosphorus Level, Serum: 3.3 mg/dL (07-27 @ 22:17)                              7.6    2.88  )-----------( 370      ( 28 Jul 2022 00:55 )             23.0     Urine Studies:  Urinalysis - [07-25-22 @ 18:28]      Color Yellow / Appearance Slightly Turbid / SG 1.020 / pH 5.5      Gluc Negative / Ketone Negative  / Bili Negative / Urobili Negative       Blood Small / Protein 100 / Leuk Est Large / Nitrite Negative      RBC 2 / WBC 40 / Hyaline 5 / Gran  / Sq Epi  / Non Sq Epi 0 / Bacteria Many    Urine Creatinine 129      [07-26-22 @ 04:17]  Urine Sodium 20      [07-26-22 @ 04:17]  Urine Osmolality 387      [07-26-22 @ 04:17]    Iron 17, TIBC 171, %sat 10      [05-02-22 @ 13:03]  Ferritin 6336      [07-27-22 @ 13:00]  PTH -- (Ca 9.2)      [02-05-22 @ 10:13]   294  HbA1c 6.0      [02-21-20 @ 08:53]  TSH 4.69      [07-06-22 @ 18:36]  Lipid: chol --, , HDL --, LDL --      [07-27-22 @ 13:00]    HBsAg Nonreact      [07-25-22 @ 11:51]  HCV 0.18, Nonreact      [07-25-22 @ 11:51]      RADIOLOGY & ADDITIONAL STUDIES:

## 2022-07-28 NOTE — PROGRESS NOTE ADULT - ASSESSMENT
66 yr old man with h/o ESRD due to DM on HD since 2019, s/p DDRT from DCD donor on 4/21/22 complicated by DGF requiring HD until 4/29/22 now admitted with seizures/status epilepticus    1. S/p DCD DDRT on 4/21/22  - creatinine slowly uptrending to 1.5. Electrolytes and volume status fair. Transplant ureteric stent removed 7/12. Renal sonogram done on 7/19 showed no hydronephrosis. MARA was not detected on US. DSA sent on 7/19/22 were negative. Pt received IVF (500 cc NS) on 7/20/22. SCr improved initially now again worsenig. Pt with fevers and worsening renal function. CT /p negative for any collections/abscess. No hydronephrosis noted on renal US. UA done on 7/25/22 showed LE and pyuria.   Follow BK PCR and adenovirus. Pt with worsening acidosis and oliguria. Pt was started on HD on 7/27/22. Pt remains oliguric. Check allograft US today. Will monitor for need for HD daily. Follow urine and blood cultures. follow fungal cultures. Continue IV antibiotics. Continue fluconazole. Check MTB blood cultures. Transplant ID following. Monitor for fevers/labs.    Transaminitis: Pt with elevated LFT, now improving. Lipitor and Bactrim discontinued. Hepatology following. Monitor LFTs.    2. Immunosuppression - Envarsus discontinued for possible neurotoxicity, seizures, PRES. Continue Belatacept, last dose on 7/18. Next dose on 8/1/22. (hold for now)   Reduced MMF to 250mgs BID for progressive leukopenia.      Continue Prednisone 5mg po daily      Infection prophylaxis - bactrim ss daily discontinued due to elevated LFTs.   continue holding valcyte 450mg daily (CMV intermediate risk) for now given leucopenia, CMV pcr negative.     3. Seizure d/o -admitted with status epilepticus .Corrected phenytoin level 3.7 (7/21). As per neurology to continue current dose of phenytoin. Check phenytoin levels as it is dialyzable. Dose being adjusted as per levels. Neurology following.      4. HTN:  On Nifedipine 60mg po bid, Doxazosin 4mg PO BID. Continue Coreg 25 mg BID. Monitor BP. Hydralazine discontinued given worsening renal function and concerns for hydralazine induced renal disfunction.      5. BPH with urine retention - continue Proscar and doxazosin. now has tukcer catheter. Strict I/O.    6. Cytopenia -  likely med induced.  Cont epo. s/p Filgrastim on 7/23/22. s/p Epogen 4K on 7/27/22. Monitor blood counts.    7. Atrial fibrillation - was on Eliquis, now on therapeutic Lovenox due to drug interaction with fosphenytoin. was on lovenox for AC now on Heparin due to worsening renal function.     8. ESBL Klebsiella UTI - Completed ertapenem x 3 days total per transplant ID. UA positive for infection. Continue IV antibiotics. ID following.     9. Hyponatremia: SNa slowly downtrending to 129 on 7/18. SNa stable at 133 today. Fluid restriction 1L. Monitor SNa. Avoid hypotonic fluids.     10. Anemia: Pt with low hb. Pt on AC. Transfuse 1 unit today. Continue epogen. Monitor H/H.

## 2022-07-28 NOTE — PROGRESS NOTE ADULT - ASSESSMENT
66 yr old M with Type 2 DM> unknown control due to skewed A1C 6.6% secondary to chronic anemia and s/p blood tx. On basal/bolus insulin therapy PTA. DM c/b ESRD s/p DDRT on 3/21, CVA, CAD s/p CABG, Afib on apixaban, seizure activity, HTN, HLD, h/o GI bleed in 2/2022 EGD with duodenal ulcer, discharged to [facility] rehab center after prior hospitalization, now re-admitted from [facility] for seizures c/f status epilepticus in setting of UTI. endocrine consulted for steroid induced hyperglycemia, now on home dose prednisone 5mg. Prolonged hospital course w/ ICU stay, previously intubated/extubated, previous seizures. S/p ureteral stent removal 7/12/22 pt is now off tube feeds and tolerating PO diet but back to ICU due to sepsis> on antibiotic.    1. Type 2 diabetes mellitus with hypoglycemia, with long-term current use of insulin   A1c 6.0%  Home Regimen: Prior to admission was at [facility], most recent insulin doses were Semglee 12 units qHS, Humalog 5 units TID pre-meal    While inpatient:  BG target 100-180 mg/dl  Within target, no hypoglycemia   Continue Lantus 5 units SQ qHS   Continue Admelog 4 units SQ TID with meals - Ensure patient is tolerating meal prior to administration to prevent hypoglycemia    Continue Admelog LOW dose correctional scale before meals, low dose at bedtime  Check BG before meals and bedtime  Hypoglycemia protocol  Consistent carbohydrate diet - noted the supplement was changed to Ensure clear today - would recommend to switch back to Nepro    Discharge Plan:   Discharge planning to rehab - continue basal/bolus insulin doses as above if FSBG at goal    Titrate further at rehab as necessary   Needs PCP/endo/optho/renal/cardiac followup as outpatient   Endocrine Followup with Dr. Lincoln, [address] [facility], [address], [place] [address], [phone]     2. Chronic steroid use  On Prednisone 5 mg daily (s/p renal transplant)  Do not hold/abruptly stop steroids   Inform endocrine team of any changes on steroid therapy or PO/TF status    3. Hypothyroidism  07-06 @ 18:36 TSH 4.69   Continue Levothyroxine 50 mcg PO daily   Please make sure LT4 is given on an empty stomach at least one hour apart from other meds and food to ensure med absorption  DO NOT GIVE WITH VITAMINS/ANTACIDS  Monitor TFTs outpatient in 4-6 weeks after dc with Dr. Lincoln    4. HTN (hypertension)  BP target less than 130/80  Within target  Management per ICU team    5. HLD (hyperlipidemia)  Statin on hold due to elevated LFT  Management per ICU team    Poornima Jaimes  Nurse Practitioner  Division of Endocrinology & Diabetes  In house pager #[phone]/long range pager #[phone]    If before 9AM or after 6PM, or on weekends/holidays, please call endocrine answering service for assistance ([phone]).  For nonurgent matters email [email or web address] for assistance.

## 2022-07-28 NOTE — PROGRESS NOTE ADULT - SUBJECTIVE AND OBJECTIVE BOX
24 HOUR EVENTS:  -Left EJ HD-cath placed  -No evidence of hydronephrosis on Renal U/S  -Received Epogen + Filgastrim  -Underwent hemodialysis, no fluid fulled off due to BP    SUBJECTIVE/ROS:  [X] A ten-point review of systems was otherwise negative except as noted.  [ ] Due to altered mental status/intubation, subjective information were not able to be obtained from the patient. History was obtained, to the extent possible, from review of the chart and collateral sources of information.      NEURO  Exam: AOx3  Meds: melatonin 6 milliGRAM(s) Oral at bedtime  phenytoin   Capsule 100 milliGRAM(s) Oral every 8 hours    [x] Adequacy of sedation and pain control has been assessed and adjusted      RESPIRATORY  RR: 19 (07-28-22 @ 00:00) (19 - 29)  SpO2: 97% (07-28-22 @ 00:00) (94% - 100%)  Wt(kg): --  Exam: unlabored, clear to auscultation bilaterally  Mechanical Ventilation:     [N/A] Extubation Readiness Assessed  Meds:       CARDIOVASCULAR  HR: 56 (07-28-22 @ 00:00) (52 - 63)  BP: 150/65 (07-28-22 @ 00:00) (95/51 - 169/72)  BP(mean): 93 (07-28-22 @ 00:00) (75 - 105)  ABP: --  ABP(mean): --  Wt(kg): --  CVP(cm H2O): --      Exam: regular rate and rhythm  Cardiac Rhythm: sinus  Perfusion     [x]Adequate   [ ]Inadequate  Mentation   [x]Normal       [ ]Reduced  Extremities  [x]Warm         [ ]Cool  Volume Status [ ]Hypervolemic [x]Euvolemic [ ]Hypovolemic  Meds: carvedilol 25 milliGRAM(s) Oral every 12 hours  doxazosin 4 milliGRAM(s) Oral <User Schedule>  NIFEdipine XL 60 milliGRAM(s) Oral daily        GI/NUTRITION  Exam: soft, nontender, nondistended, incision C/D/I  Diet: consistent carb diet  Meds: pantoprazole    Tablet 40 milliGRAM(s) Oral daily  polyethylene glycol 3350 17 Gram(s) Oral daily  senna 2 Tablet(s) Oral at bedtime      GENITOURINARY  I&O's Detail    07-26 @ 07:01 - 07-27 @ 07:00  --------------------------------------------------------  IN:    IV PiggyBack: 250 mL    Oral Fluid: 350 mL    Sodium Bicarbonate: 600 mL    sodium chloride 0.9%: 1300 mL  Total IN: 2500 mL    OUT:    Indwelling Catheter - Urethral (mL): 120 mL  Total OUT: 120 mL    Total NET: 2380 mL      07-27 @ 07:01 - 07-28 @ 00:50  --------------------------------------------------------  IN:    IV PiggyBack: 300 mL    Oral Fluid: 550 mL    Sodium Bicarbonate: 400 mL  Total IN: 1250 mL    OUT:    Indwelling Catheter - Urethral (mL): 95 mL    Other (mL): 0 mL  Total OUT: 95 mL    Total NET: 1155 mL          07-27    135  |  97  |  63<H>  ----------------------------<  144<H>  4.1   |  24  |  2.24<H>    Ca    8.1<L>      27 Jul 2022 22:17  Phos  3.3     07-27  Mg     1.9     07-27    TPro  6.1  /  Alb  2.5<L>  /  TBili  0.4  /  DBili  x   /  AST  110<H>  /  ALT  259<H>  /  AlkPhos  281<H>  07-27    [ ] Miranda catheter, indication: N/A  Meds: sodium bicarbonate 1300 milliGRAM(s) Oral <User Schedule>        HEMATOLOGIC  Meds:   [x] VTE Prophylaxis                        7.3    2.82  )-----------( 337      ( 27 Jul 2022 22:17 )             21.8     PT/INR - ( 27 Jul 2022 22:17 )   PT: 16.3 sec;   INR: 1.40 ratio         PTT - ( 27 Jul 2022 22:17 )  PTT:32.1 sec  Transfusion     [ ] PRBC   [ ] Platelets   [ ] FFP   [ ] Cryoprecipitate      INFECTIOUS DISEASES  WBC Count: 2.82 K/uL (07-27 @ 22:17)  WBC Count: 2.97 K/uL (07-27 @ 13:00)    RECENT CULTURES:  Specimen Source: Catheterized Catheterized  Date/Time: 07-25 @ 18:27  Culture Results:   50,000 - 99,000 CFU/mL Klebsiella pneumoniae  Gram Stain: --  Organism: --  Specimen Source: .Blood Blood  Date/Time: 07-25 @ 14:28  Culture Results:   No growth to date.  Gram Stain: --  Organism: --  Specimen Source: .Blood Blood  Date/Time: 07-25 @ 11:59  Culture Results:   No growth to date.  Gram Stain: --  Organism: --    Meds: epoetin cortney-epbx (RETACRIT) Injectable 75885 Unit(s) SubCutaneous every 7 days  fluconAZOLE IVPB 400 milliGRAM(s) IV Intermittent every 24 hours  meropenem  IVPB      meropenem  IVPB 1000 milliGRAM(s) IV Intermittent every 12 hours        ENDOCRINE  CAPILLARY BLOOD GLUCOSE      POCT Blood Glucose.: 145 mg/dL (27 Jul 2022 22:00)  POCT Blood Glucose.: 107 mg/dL (27 Jul 2022 17:15)  POCT Blood Glucose.: 156 mg/dL (27 Jul 2022 12:24)  POCT Blood Glucose.: 163 mg/dL (27 Jul 2022 08:07)    Meds: finasteride 5 milliGRAM(s) Oral daily  insulin glargine Injectable (LANTUS) 5 Unit(s) SubCutaneous at bedtime  insulin lispro (ADMELOG) corrective regimen sliding scale   SubCutaneous three times a day with meals  insulin lispro (ADMELOG) corrective regimen sliding scale   SubCutaneous at bedtime  insulin lispro Injectable (ADMELOG) 4 Unit(s) SubCutaneous three times a day before meals  levothyroxine 50 MICROGram(s) Oral daily  predniSONE   Tablet 5 milliGRAM(s) Oral daily        ACCESS DEVICES:  [ ] Peripheral IV  [ ] Central Venous Line	[ ] R	[ ] L	[ ] IJ	[ ] Fem	[ ] SC	Placed:   [ ] Arterial Line		[ ] R	[ ] L	[ ] Fem	[ ] Rad	[ ] Ax	Placed:   [ ] PICC:					[ ] Mediport  [ ] Urinary Catheter, Date Placed:   [x] Necessity of urinary, arterial, and venous catheters discussed    OTHER MEDICATIONS:  chlorhexidine 2% Cloths 1 Application(s) Topical <User Schedule>      CODE STATUS:      IMAGING:

## 2022-07-28 NOTE — PROGRESS NOTE ADULT - SUBJECTIVE AND OBJECTIVE BOX
Interventional Radiology Follow-Up Note.     Patient seen and examined @ bedside around 8am.     This is a 66y Male s/p Left EJ non tunneled HD Catheter on 7/27 in Interventional Radiology.             Medication:     carvedilol: (07-28)  doxazosin: (07-28)  fluconAZOLE IVPB: (07-28)  meropenem  IVPB: (07-28)  NIFEdipine XL: (07-28)    Vitals:   T(F): 100, Max: 100.4 (06:47)  HR: 58  BP: 134/60  RR: 23  SpO2: 96%    Physical Exam:  General: Nontoxic, in NAD.  Left chest non tunneled HD catheter site dressing with dry blood. NO active bleeding noted.     LABS:  WBC 2.88 / Hgb 7.6 / Hct 23.0 / Plt 370  Na 133 / K 4.8 / CO2 22 / Cl 96 / BUN 67 / Cr 2.57 / Glucose 149   /  / Alk Phos 287 / Tbili 0.4  Ptt 32.1 / Pt 16.0 / INR 1.37      Assessment/Plan:  66y Male s/p DDRT s/p non tunneled HD Catheter for HD,     - Okay to use catheter.   - IR will sign off.   - Reconsult as needed.          Please call IR  [phone] with any questions, concerns, or issues regarding above.    Also available on Teams.

## 2022-07-28 NOTE — PROGRESS NOTE ADULT - NUTRITIONAL ASSESSMENT
This patient has been assessed with a concern for Malnutrition and has been determined to have a diagnosis/diagnoses of Severe protein-calorie malnutrition.    This patient is being managed with:   Diet Soft and Bite Sized-  Consistent Carbohydrate {Evening Snack} (CSTCHOSN)  For patients receiving Renal Replacement - No Protein Restr No Conc K No Conc Phos Low Sodium (RENAL)  Supplement Feeding Modality:  Oral  Nepro Cans or Servings Per Day:  3       Frequency:  Three Times a day  Entered: Jul 28 2022  3:13PM

## 2022-07-28 NOTE — PROGRESS NOTE ADULT - ATTENDING COMMENTS
67 yo m, s/p DDKT, in SICU for sepsis, AMS, ANA, elevated LFTs.  - Dialyzed yesterday HD. MS improved.  - Pain adequately controlled.  - Continue current BP regimen.  - Diet and bowel regimen.  - UOP 5cc/h. Nephrology following.  - Chemical DVT on hold per transplant. Hgb 7.6. Monitor.  - WBC 2.9, s/p Neupogen yesterday.  - UTI on meropenem and diflucan.  - IS per transplant.  - ISS.

## 2022-07-28 NOTE — PROGRESS NOTE ADULT - ASSESSMENT
ASSESSMENT:  67 y/o male w/ a PMHx of CAD s/p CABG, AF on Eliquis c/b CVA c/b seizures, HTN, HLD, DM type II, hypothyroidism, GI bleed due to a duodenal ulcer, and ESRD s/p DDRT c/b DGF requiring HD & large perinephric hematoma s/p evacuation w/ revision of arterial anastomosis who presented in status epilepticus requiring intubation for airway protection with a hospital course c/b large right pleural effusion s/p thoracentesis c/b hemothorax while being anticoagulated & requiring intubation for airway protection s/p chest tube placement w/ CT demonstrating retained hemothorax s/p right VATS, status epilepticus again causing AMS requiring intubation for airway protection again, delirium, dysphagia requiring NGT placement for enteral access, hyperkalemia, and poor glycemic control. Pt transferred to floor 7/10 and readmitted to SICU 7/25 for leukopenia, transaminitis, and ANA.       Neuro:  - Phenytoin 100 q8 for seizure ppx  - Multimodal pain control w/Tylenol  - Melatonin + protected sleep    Resp: no acute issues  - Out of bed to chair, ambulate as tolerated, and incentive spirometry to prevent atelectasis    CVS:   - Nifedipine 60 mg qd  - Coreg 25 mg q12    GI:   - Consistent carb diet /w snack  - Bowel regimen with senna & Miralax  - Protonix  - Transaminitis improving, continue holding hydralazine, lipitor, bactrim    Renal:   - Monitor I&Os and electrolytes  - replete electrolytes as needed   - Doxazosin + Finasteride  - IV locked    Heme:  - Monitor CBC and coags  - SCDs  - f/u primary team on restarting VTE today    ID:   - Monitor for clinical evidence of active infection  - Monitor WBC, temperature, and procalcitonin  - Meropenam + Fluconazole  - MMF, Bactrim, Valcyte held  - Prednisone 5 mg    Endo: hx of DM, Hypothyroidism  - Monitor glucose of bmp  - 5 lantus, 4 premeal, ISS  - Home synthroid     GOC:     Lines/Tubes:

## 2022-07-28 NOTE — PROGRESS NOTE ADULT - NUTRITIONAL ASSESSMENT
This patient has been assessed with a concern for Malnutrition and has been determined to have a diagnosis/diagnoses of Severe protein-calorie malnutrition.    This patient is being managed with:   Diet Consistent Carbohydrate w/Evening Snack-  1200mL Fluid Restriction ([id number])  For patients receiving Renal Replacement - No Protein Restr No Conc K No Conc Phos Low Sodium (RENAL)  Supplement Feeding Modality:  Oral  Nepro Cans or Servings Per Day:  2       Frequency:  Daily  Entered: Jul 27 2022  9:34PM

## 2022-07-28 NOTE — PROGRESS NOTE ADULT - ASSESSMENT
66M with h/o DM type II, HTN, CAD s/p CABG in 2020, Afib on Eliquis, CVA in 2019 due to Afib, Seizure d/o (last episode was on 4/8/22), h/o GI bleed in 2/2022 EGD with duodenal ulcer and ESRD on HD s/p R DDRT from DCD donor on 4/21/22 complicated by DGF requiring HD until 4/29/22 now with good graft function who presented with status epilepticus in setting of UTI/antibiotics and sub-therapeutic valproic acid level     Acute Transaminitis - now improving   - Hepatology following   - hepatotoxic medications held  - RUQ abd- Abdominal doppler - no PVT   - CT a/p non contrast- small perinephric collection   - Labs: Hepatitis panel, BK PCR, EBV PCR, CMV with PCR   - Infectious w/u: bld cxs with NGTD; urine cx with Kleb pneumo   - ID following: change miguel to ertapenem 500mg daily  - On empiric fluc      R DCD DDRT 4/21/22 now with ANA  - S/P removal of ureteral stent on 7/12  - s/p HD yesterday (no fluid removed)  - DM diet as tolerated with aspiration precautions  - Continue Doxazosin/Proscar for BPH  - OOB with RN/PT  - Leukopenia: valcyte/mmf on hold     Immunosuppression:   - Belatacept: 6/23, 7/4, 7/8. Re-loaded 7/8,  as there was a gap between doses 2/2 seizures. 7/18,  next dose 8/1  - MMF held  - continue Pred 5  - PPx: Valcyte/ bactrim on hold. On PPI/fluc    Seizure Disorder:  - MRI head 6/27 with less concerns for PRES, likely ischemic changes  - Remains seizure free  - Antiepileptic regimen per Neurology, on Phenytoin 100mg TID, no further adjustment required  - Keep Mag > 2    DM  - on Lantus/Lispro, Endocrine following     AFib  RIJ DVT   - Eliquis with ~40% less efficacy while on fosphenytoin.    - Lovenox held for ANA and transaminitis   - rate controlled    HTN:  - Nifedipine/Cardura/Coreg.   - dced Hydralazine (in setting of transaminitis )    Dispo:   In  SICU for close monitoring  High risk for decompensation  Discussed with pt's son.

## 2022-07-29 NOTE — PROGRESS NOTE ADULT - PROBLEM SELECTOR PLAN 1
-Test BG AC/HS   -C/w Lantus 5 units at bedtime.    -C/w Admelog 4 units tid with meals ensure pt tolerating meal prior to administration to prevent hypoglycemia, If prandial BG ac meals consistently >180s increase to 5 units with meals  -C/w low Admelog  correction scale AC meals and Low HS scale  -Please inform endo team of any changes on steroid therapy or PO/TF status  Discharge  plan to rehab, c/w basal bolus insulin final doses as above if FBG at goal  Titrate further at rehab as necessary .   Outpatient f/u with Endocrinologist Dr. Lincoln. [address] [facility] [facility] [address]. Phone  Needs apt at time of discharge  -Needs optho/renal/cardiac f/u as out pt  -Make sure pt has insulin and DM supplies at time of discharge.

## 2022-07-29 NOTE — PROGRESS NOTE ADULT - ATTENDING COMMENTS
Renal transplant recipient with prolonged admission  Now with fevers and transaminitis  CT C/A/P without obvious infectious focus  UCx with ESBL Klebsiella  Viral PCR's so far negative  ?Transplant pyelonephritis as etiology of high grade / persistent fevers  Anticipate completing at least 10 day course of Ertapenem    I will be away over this upcoming weekend. Please contact the Infectious Diseases Office with any further questions or concerns.     Carlton Hoover M.D.  [facility] Division of Infectious Disease  8AM-5PM Monday - Friday: Available on [employer] Teams  After Hours and Holidays (or if no response on [employer] Teams): Please contact the Infectious Diseases Office at [phone]     The above assessment and plan were discussed with Altagracia transplant surgery NP

## 2022-07-29 NOTE — PROGRESS NOTE ADULT - ASSESSMENT
66 yr old M with Type 2 DM> unknown control due to skewed A1C 6.6% secondary to chronic anemia and s/p blood tx. On basal/bolus insulin therapy PTA. DM c/b ESRD s/p DDRT on 3/21, CVA, CAD s/p CABG, Afib on apixaban, seizure activity, HTN, HLD, h/o GI bleed in 2/2022 EGD with duodenal ulcer, discharged to [facility] rehab center after prior hospitalization, now re-admitted from [facility] for seizures c/f status epilepticus in setting of UTI. endocrine consulted for steroid induced hyperglycemia, now on home dose prednisone 5mg. Prolonged hospital course w/ ICU stay, previously intubated/extubated, previous seizures. S/p ureteral stent removal 7/12/22 pt is now on and off NPO for testing to r/o rejection/ obstruction due to anuria and worsening creat. Also on antibiotic for sepsis. BG going up but creat going up placing pt at risk for hypoglycemia. Will Will c/w present insulin regimen for now and re evaluate and adjust insulin doses tomorrow if BG above goal 100 to 180s.

## 2022-07-29 NOTE — PROGRESS NOTE ADULT - ASSESSMENT
ASSESSMENT:  65 y/o male w/ a PMHx of CAD s/p CABG, AF on Eliquis c/b CVA c/b seizures, HTN, HLD, DM type II, hypothyroidism, GI bleed due to a duodenal ulcer, and ESRD s/p DDRT c/b DGF requiring HD & large perinephric hematoma s/p evacuation w/ revision of arterial anastomosis who presented in status epilepticus requiring intubation for airway protection with a hospital course c/b large right pleural effusion s/p thoracentesis c/b hemothorax while being anticoagulated & requiring intubation for airway protection s/p chest tube placement w/ CT demonstrating retained hemothorax s/p right VATS, status epilepticus again causing AMS requiring intubation for airway protection again, delirium, dysphagia requiring NGT placement for enteral access, hyperkalemia, and poor glycemic control. Pt transferred to floor 7/10 and readmitted to SICU 7/25 for leukopenia, transaminitis, and ANA.     Neuro:  - Phenytoin 100 q8 for seizure ppx  - f/u Phenytoin levels, levels low due to dialysis  - Multimodal pain control w/Tylenol  - Melatonin + protected sleep    Resp: no acute issues  - Out of bed to chair, ambulate as tolerated, and incentive spirometry to prevent atelectasis      CVS:   - Nifedipine 60 mg qd  - Coreg 25 mg q12    GI:   - NPO for possible procedure today  - Transaminitis continues to improve, continue holding hydralazine, lipitor, bactrim  - Bowel regimen with Miralax, holding senna due to multiple bowel movements overnight  - Protonix for stress ulcer prophylaxis while intubated/ npo    Renal:  - Monitor I&Os and electrolytes  - replete electrolytes as needed   - Doxazosin + Finasteride  - IV locked    Heme:  - Monitor CBC and coags  - Receiving Epogen  - SCDs  - holding VTE ppx for now pending procedure    ID:   - Monitor for clinical evidence of active infection  - Monitor WBC, temperature, and procalcitonin  - Erbapenam until 8/3 + Fluconazole  -  BID + Prednisone taper    Endo: hx of DM, Hypothyroidism  - Monitor glucose of bmp  - Lantus 5, premeal 4, and ISS  - Home Synthroid    GOC:     Lines/Tubes:         ASSESSMENT:  65 y/o male w/ a PMHx of CAD s/p CABG, AF on Eliquis c/b CVA c/b seizures, HTN, HLD, DM type II, hypothyroidism, GI bleed due to a duodenal ulcer, and ESRD s/p DDRT c/b DGF requiring HD & large perinephric hematoma s/p evacuation w/ revision of arterial anastomosis who presented in status epilepticus requiring intubation for airway protection with a hospital course c/b large right pleural effusion s/p thoracentesis c/b hemothorax while being anticoagulated & requiring intubation for airway protection s/p chest tube placement w/ CT demonstrating retained hemothorax s/p right VATS, status epilepticus again causing AMS requiring intubation for airway protection again, delirium, dysphagia requiring NGT placement for enteral access, hyperkalemia, and poor glycemic control. Pt transferred to floor 7/10 and readmitted to SICU 7/25 for leukopenia, transaminitis, and ANA.     Neuro:  - Phenytoin 100 q8 for seizure ppx  - f/u Phenytoin levels, levels low due to dialysis  - Multimodal pain control w/Tylenol  - Melatonin + protected sleep    Resp: no acute issues  - Out of bed to chair, ambulate as tolerated, and incentive spirometry to prevent atelectasis      CVS:   - Nifedipine 60 mg qd  - Coreg 25 mg q12    GI:   - NPO for possible procedure today  - Transaminitis continues to improve, continue holding hydralazine, lipitor, bactrim  - Bowel regimen with Miralax, holding senna due to multiple bowel movements overnight  - Protonix for stress ulcer prophylaxis while intubated/ npo    Renal:  - Monitor I&Os and electrolytes  - replete electrolytes as needed   - Doxazosin + Finasteride  - IV locked    Heme:  - Monitor CBC and coags  - Receiving Epogen  - SCDs  - holding VTE ppx for now pending procedure    ID:   - Monitor for clinical evidence of active infection  - Monitor WBC, temperature, and procalcitonin  - Erbapenam until 8/3 + Fluconazole  - Prednisone taper    Endo: hx of DM, Hypothyroidism  - Monitor glucose of bmp  - Lantus 5, premeal 4, and ISS  - Home Synthroid    GOC:     Lines/Tubes:

## 2022-07-29 NOTE — PROGRESS NOTE ADULT - NUTRITIONAL ASSESSMENT
Diet, NPO:   NPO for Procedure/Test     NPO Start Date: 22-Jul-2022,   NPO Start Time: 01:00  Except Medications (07-29-22 @ 00:48) [Active]      Please see RD assessment and/or follow up.  Managed by primary team as well

## 2022-07-29 NOTE — CONSULT NOTE ADULT - ASSESSMENT
Vascular & Interventional Radiology Consult Note    Evaluate for Procedure: Renal transplant biopsy & evaluation for ureteral obstruction    HPI: 66 year old Male with PMHx ESRD s/p PermCath removal 5/10/22 s/p Rt DDRT 4/21/22,  CVA (2019), Afib on apixaban, seizure activity, CAD s/p stents, CABG (2020), HTN, HLD, DM on insulin, gastric/duodenal ulcer, recent hospitalization 4/28/22 -5/10/22 with weakness/anemia found to have perinephric hematoma requiring evacuation and repair of bleeding arterial anastomosis who presented to [facility] for status epilepticus requiring intubation for hypoxic respiratory failure. Now becoming anuric and was previously making urine c/f possible ATN or rejection. IR c/s for biopsy and injection of contrast into the ureter to evaluate for obstruction.    Allergies:   Medications (Abx/Cardiac/Anticoagulation/Blood Products)    carvedilol: 25 milliGRAM(s) Oral (07-29 @ 08:12)  doxazosin: 4 milliGRAM(s) Oral (07-29 @ 06:00)  ertapenem  IVPB: 100 mL/Hr IV Intermittent (07-28 @ 16:55)  fluconAZOLE IVPB: 100 mL/Hr IV Intermittent (07-29 @ 08:12)  meropenem  IVPB: 100 mL/Hr IV Intermittent (07-28 @ 05:04)  NIFEdipine XL: 60 milliGRAM(s) Oral (07-29 @ 06:00)    Data:    T(C): 37  HR: 53  BP: 189/81  RR: 18  SpO2: 100%    -WBC 4.28 / HgB 7.9 / Hct 24.2 / Plt 420  -Na 132 / Cl 95 / BUN 88 / Glucose 213  -K 4.9 / CO2 21 / Cr 3.41  - / Alk Phos 337 / T.Bili 0.4  -INR1.29    Imaging: < from: US Trans Kidney w/ Doppler, Right (07.28.22 @ 22:54) >  IMPRESSION:    Fullness of the right collecting system with continued urothelial   thickening. Correlate with urinalysis and lab values to assess for   superimposed infection.    Interval increase in the resistive indices and peak systolic velocities   compared to prior study performed yesterday. Short-term imaging follow-up   if clinically indicated.    Reidentified small free fluid at the upper pole of the transplant kidney.    < end of copied text >      Assessment:   66 year old Male with PMHx ESRD s/p PermCath removal 5/10/22 s/p Rt DDRT 4/21/22,  CVA (2019), Afib on apixaban, seizure activity, CAD s/p stents, CABG (2020), HTN, HLD, DM on insulin, gastric/duodenal ulcer, recent hospitalization 4/28/22 -5/10/22 with weakness/anemia found to have perinephric hematoma requiring evacuation and repair of bleeding arterial anastomosis who presented to [facility] for status epilepticus requiring intubation for hypoxic respiratory failure. Now becoming anuric and was previously making urine c/f possible ATN or rejection. IR c/s for biopsy and injection of contrast into the ureter to evaluate for obstruction.    Plan:  - Keep NPO.  - Cont to hold AC.  - Plan for renal biopsy and ureter evaluation today (7/29/22).  - Please put in IR procedure order under IR attending Dr. Fortune. Vascular & Interventional Radiology Consult Note    Evaluate for Procedure: Renal transplant biopsy & evaluation for ureteral obstruction    HPI: 66 year old Male with PMHx ESRD s/p PermCath removal 5/10/22 s/p Rt DDRT 4/21/22,  CVA (2019), Afib on apixaban, seizure activity, CAD s/p stents, CABG (2020), HTN, HLD, DM on insulin, gastric/duodenal ulcer, recent hospitalization 4/28/22 -5/10/22 with weakness/anemia found to have perinephric hematoma requiring evacuation and repair of bleeding arterial anastomosis who presented to [facility] for status epilepticus requiring intubation for hypoxic respiratory failure. Now becoming anuric and was previously making urine c/f possible ATN or rejection. IR c/s for biopsy and injection of contrast into the collecting system to evaluate for obstruction.    Allergies:   Medications (Abx/Cardiac/Anticoagulation/Blood Products)    carvedilol: 25 milliGRAM(s) Oral (07-29 @ 08:12)  doxazosin: 4 milliGRAM(s) Oral (07-29 @ 06:00)  ertapenem  IVPB: 100 mL/Hr IV Intermittent (07-28 @ 16:55)  fluconAZOLE IVPB: 100 mL/Hr IV Intermittent (07-29 @ 08:12)  meropenem  IVPB: 100 mL/Hr IV Intermittent (07-28 @ 05:04)  NIFEdipine XL: 60 milliGRAM(s) Oral (07-29 @ 06:00)    Data:    T(C): 37  HR: 53  BP: 189/81  RR: 18  SpO2: 100%    -WBC 4.28 / HgB 7.9 / Hct 24.2 / Plt 420  -Na 132 / Cl 95 / BUN 88 / Glucose 213  -K 4.9 / CO2 21 / Cr 3.41  - / Alk Phos 337 / T.Bili 0.4  -INR1.29    Imaging: < from: US Trans Kidney w/ Doppler, Right (07.28.22 @ 22:54) >  IMPRESSION:    Fullness of the right collecting system with continued urothelial   thickening. Correlate with urinalysis and lab values to assess for   superimposed infection.    Interval increase in the resistive indices and peak systolic velocities   compared to prior study performed yesterday. Short-term imaging follow-up   if clinically indicated.    Reidentified small free fluid at the upper pole of the transplant kidney.    < end of copied text >      Assessment:   66 year old Male with PMHx ESRD s/p PermCath removal 5/10/22 s/p Rt DDRT 4/21/22,  CVA (2019), Afib on apixaban, seizure activity, CAD s/p stents, CABG (2020), HTN, HLD, DM on insulin, gastric/duodenal ulcer, recent hospitalization 4/28/22 -5/10/22 with weakness/anemia found to have perinephric hematoma requiring evacuation and repair of bleeding arterial anastomosis who presented to [facility] for status epilepticus requiring intubation for hypoxic respiratory failure. Now becoming anuric and was previously making urine c/f possible ATN or rejection. IR c/s for biopsy and injection of contrast into the collecting system to evaluate for obstruction.    Plan:  - Keep NPO.  - Cont to hold AC.  - Plan for renal biopsy and ureter evaluation today (7/29/22).  - Please put in IR procedure order under IR attending Dr. Fortune.

## 2022-07-29 NOTE — PROGRESS NOTE ADULT - TIME BILLING
Kidney Transplant recipient with ANA  Fever under evaluation, Bacteriuria  Anemia  Allo  Patient seen, examined and reviewed available clinical and lab data including history,  progress notes and consult notes.  Reviewed immunosuppression and allograft function including urine out put, creatinine trend, urine studies and  imaging.  Reviewed medication regimen for comorbidities   Reviewed imaging findings from I/R with transplant team.  Suggestions:  On minimized immunosuppression, Belatacept/Steroids  Antimicrobial regimen noted, Transplant ID f/u noted  F/u allograft biopsy  Dialysis with PRBC today  I have seen the patient and reviewed dialysis prescription  . Dialysis access is functioning well. Patient is tolerating dialysis well with no acute symptoms or distress. Dialysis prescription has been adjusted for optimized control of volume status, uremia and electrolytes. Management of additional metabolic abnormalities/anemia will continue to be addressed on follow up.  Fluid removal limited by hemodynamic status; d/w dialysis team  I was present during and reviewed clinical and lab data as well as assessment and plan as documented  . Please contact if any additional questions with any change in clinical condition or on availability of any additional information or reports.

## 2022-07-29 NOTE — PROGRESS NOTE ADULT - NUTRITIONAL ASSESSMENT
This patient has been assessed with a concern for Malnutrition and has been determined to have a diagnosis/diagnoses of Severe protein-calorie malnutrition.    This patient is being managed with:   Diet NPO-  NPO for Procedure/Test     NPO Start Date: 22-Jul-2022   NPO Start Time: 01:00  Except Medications  Entered: Jul 29 2022 12:48AM    Diet Soft and Bite Sized-  Consistent Carbohydrate {Evening Snack} (CSTCHOSN)  For patients receiving Renal Replacement - No Protein Restr No Conc K No Conc Phos Low Sodium (RENAL)  Supplement Feeding Modality:  Oral  Nepro Cans or Servings Per Day:  3       Frequency:  Three Times a day  Entered: Jul 28 2022  3:13PM

## 2022-07-29 NOTE — PROGRESS NOTE ADULT - SUBJECTIVE AND OBJECTIVE BOX
[facility]   MD NINA MASON MD, DO SADIE RAMIREZ NP    TEL:  OFFICE: [phone]    From 5pm-7am Answering Service [phone]    -- RENAL FOLLOW UP NOTE ---Date of Service 07-29-22 @ 14:11    Patient is a 66y old  Male who presents with a chief complaint of Status Epilepticus (29 Jul 2022 11:59)      Patient seen and examined at bedside. No chest pain/sob    VITALS:  T(F): 97.4 (07-29-22 @ 14:03), Max: 100.2 (07-28-22 @ 16:00)  HR: 52 (07-29-22 @ 14:03)  BP: 126/59 (07-29-22 @ 14:03)  RR: 17 (07-29-22 @ 14:03)  SpO2: 97% (07-29-22 @ 14:03)  Wt(kg): --    07-28 @ 07:01  -  07-29 @ 07:00  --------------------------------------------------------  IN: 1900 mL / OUT: 97 mL / NET: 1803 mL    07-29 @ 07:01  -  07-29 @ 14:11  --------------------------------------------------------  IN: 250 mL / OUT: 10 mL / NET: 240 mL          PHYSICAL EXAM:  Constitutional: NAD  Neck: No JVD  Respiratory: CTAB, no wheezes, rales or rhonchi  Cardiovascular: S1, S2, RRR  Gastrointestinal: BS+, soft, NT/ND  Extremities: + peripheral edema    Hospital Medications:   MEDICATIONS  (STANDING):  carvedilol 25 milliGRAM(s) Oral every 12 hours  chlorhexidine 2% Cloths 1 Application(s) Topical <User Schedule>  doxazosin 4 milliGRAM(s) Oral <User Schedule>  epoetin cortney-epbx (RETACRIT) Injectable 23148 Unit(s) SubCutaneous every 7 days  ertapenem  IVPB 500 milliGRAM(s) IV Intermittent every 24 hours  finasteride 5 milliGRAM(s) Oral daily  fluconAZOLE IVPB 400 milliGRAM(s) IV Intermittent every 24 hours  insulin glargine Injectable (LANTUS) 5 Unit(s) SubCutaneous at bedtime  insulin lispro (ADMELOG) corrective regimen sliding scale   SubCutaneous three times a day with meals  insulin lispro (ADMELOG) corrective regimen sliding scale   SubCutaneous at bedtime  insulin lispro Injectable (ADMELOG) 4 Unit(s) SubCutaneous three times a day before meals  levothyroxine 50 MICROGram(s) Oral daily  melatonin 6 milliGRAM(s) Oral at bedtime  NIFEdipine XL 60 milliGRAM(s) Oral daily  pantoprazole    Tablet 40 milliGRAM(s) Oral daily  phenytoin   Capsule 100 milliGRAM(s) Oral every 8 hours  polyethylene glycol 3350 17 Gram(s) Oral daily  predniSONE   Tablet 5 milliGRAM(s) Oral daily  senna 2 Tablet(s) Oral at bedtime  sodium bicarbonate 1300 milliGRAM(s) Oral <User Schedule>      LABS:  07-28    132<L>  |  95<L>  |  88<H>  ----------------------------<  213<H>  4.9   |  21<L>  |  3.41<H>    Ca    8.4      28 Jul 2022 23:12  Phos  4.6     07-28  Mg     2.3     07-28    TPro  6.2  /  Alb  2.4<L>  /  TBili  0.4  /  DBili      /  AST  92<H>  /  ALT  197<H>  /  AlkPhos  337<H>  07-28    Creatinine Trend: 3.41 <--, 2.57 <--, 2.24 <--, 3.77 <--, 3.34 <--, 3.17 <--, 2.67 <--, 2.45 <--, 2.22 <--, 2.13 <--, 1.79 <--, 1.80 <--, 1.50 <--    Albumin, Serum: 2.4 g/dL (07-28 @ 23:12)  Phosphorus Level, Serum: 4.6 mg/dL (07-28 @ 23:12)                              7.9    4.28  )-----------( 420      ( 28 Jul 2022 23:14 )             24.2     Urine Studies:  Urinalysis - [07-25-22 @ 18:28]      Color Yellow / Appearance Slightly Turbid / SG 1.020 / pH 5.5      Gluc Negative / Ketone Negative  / Bili Negative / Urobili Negative       Blood Small / Protein 100 / Leuk Est Large / Nitrite Negative      RBC 2 / WBC 40 / Hyaline 5 / Gran  / Sq Epi  / Non Sq Epi 0 / Bacteria Many    Urine Creatinine 129      [07-26-22 @ 04:17]  Urine Sodium 20      [07-26-22 @ 04:17]  Urine Osmolality 387      [07-26-22 @ 04:17]    Iron 17, TIBC 171, %sat 10      [05-02-22 @ 13:03]  Ferritin 6336      [07-27-22 @ 13:00]  PTH -- (Ca 9.2)      [02-05-22 @ 10:13]   294  HbA1c 6.0      [02-21-20 @ 08:53]  TSH 4.69      [07-06-22 @ 18:36]  Lipid: chol --, , HDL --, LDL --      [07-27-22 @ 13:00]    HBsAg Nonreact      [07-25-22 @ 11:51]  HCV 0.18, Nonreact      [07-25-22 @ 11:51]      RADIOLOGY & ADDITIONAL STUDIES:

## 2022-07-29 NOTE — PROGRESS NOTE ADULT - ASSESSMENT
66M with h/o DM type II, HTN, CAD s/p CABG in 2020, Afib on Eliquis, CVA in 2019 due to Afib, Seizure d/o (last episode was on 4/8/22), h/o GI bleed in 2/2022 EGD with duodenal ulcer and ESRD on HD s/p R DDRT from DCD donor on 4/21/22 complicated by DGF requiring HD until 4/29/22 now with good graft function who presented with status epilepticus in setting of UTI/antibiotics and sub-therapeutic valproic acid level         Acute Transaminitis - now improving   - Hepatology following   - hepatotoxic medications held  - RUQ abd- Abdominal doppler - no PVT   - CT a/p non contrast- small perinephric collection   - Labs: Hepatitis panel, BK PCR, EBV PCR, CMV with PCR   - Infectious w/u: bld cxs with NGTD; urine cx with Kleb pneumo   - ID following: on Erta started 7/28 now off miguel   - On  empiric fluc      R DCD DDRT 4/21/22 now with ANA  - S/P removal of ureteral stent on 7/12  - s/p HD 7/27 - plan for HD today per neprhology   - NPO for IR procedure - when appropriate DM diet as tolerated with aspiration precautions  - Continue Doxazosin/Proscar for BPH  - OOB with RN/PT  - Leukopenia: valcyte/mmf on hold   - IR today for renal bx and evaluation of collecting system for possible perc neph   - 1 U prbc w/ HD today     Immunosuppression:   - Belatacept: 6/23, 7/4, 7/8. Re-loaded 7/8,  as there was a gap between doses 2/2 seizures. 7/18,  next dose 8/1  - MMF held  - continue Pred 5  - PPx: Valcyte/ bactrim on hold. On PPI/fluc    Seizure Disorder:  - MRI head 6/27 with less concerns for PRES, likely ischemic changes  - Remains seizure free  - Antiepileptic regimen per Neurology, on Phenytoin 100mg TID, no further adjustment required  - Keep Mag > 2    DM  - on Lantus/Lispro, Endocrine following     AFib  RIJ DVT   - Eliquis with ~40% less efficacy while on fosphenytoin.    - Lovenox held for ANA and transaminitis   - rate controlled    HTN:  - Nifedipine/Cardura/Coreg.   - dced Hydralazine (in setting of transaminitis )    Dispo:   In  SICU for close monitoring  High risk for decompensation  Discussed with pt's son.

## 2022-07-29 NOTE — PROGRESS NOTE ADULT - SUBJECTIVE AND OBJECTIVE BOX
--------------------------------------------------------------------------------  Chief Complaint:  Allograft imaging studies to r/o obstruction and allograft biopsy today  24 hour events/subjective:    Reviewed    PAST HISTORY  --------------------------------------------------------------------------------  No significant changes to PMH, PSH, FHx, SHx, unless otherwise noted    ALLERGIES & MEDICATIONS  --------------------------------------------------------------------------------  Allergies    No Known Allergies    Intolerances      Standing Inpatient Medications  carvedilol 25 milliGRAM(s) Oral every 12 hours  chlorhexidine 2% Cloths 1 Application(s) Topical <User Schedule>  doxazosin 4 milliGRAM(s) Oral <User Schedule>  epoetin cortney-epbx (RETACRIT) Injectable 95152 Unit(s) SubCutaneous every 7 days  ertapenem  IVPB 500 milliGRAM(s) IV Intermittent every 24 hours  finasteride 5 milliGRAM(s) Oral daily  insulin glargine Injectable (LANTUS) 5 Unit(s) SubCutaneous at bedtime  insulin lispro (ADMELOG) corrective regimen sliding scale   SubCutaneous three times a day with meals  insulin lispro (ADMELOG) corrective regimen sliding scale   SubCutaneous at bedtime  insulin lispro Injectable (ADMELOG) 4 Unit(s) SubCutaneous three times a day before meals  levothyroxine 50 MICROGram(s) Oral daily  melatonin 6 milliGRAM(s) Oral at bedtime  NIFEdipine XL 60 milliGRAM(s) Oral daily  norepinephrine Infusion 0.05 MICROgram(s)/kG/Min IV Continuous <Continuous>  pantoprazole    Tablet 40 milliGRAM(s) Oral daily  phenytoin   Capsule 100 milliGRAM(s) Oral every 8 hours  polyethylene glycol 3350 17 Gram(s) Oral daily  predniSONE   Tablet 5 milliGRAM(s) Oral daily  senna 2 Tablet(s) Oral at bedtime  sodium bicarbonate 1300 milliGRAM(s) Oral <User Schedule>    PRN Inpatient Medications  acetaminophen     Tablet .. 1000 milliGRAM(s) Oral every 6 hours PRN      REVIEW OF SYSTEMS  --------------------------------------------------------------------------------  Noted events.    VITALS/PHYSICAL EXAM  --------------------------------------------------------------------------------  T(C): 36.3 (07-29-22 @ 19:25), Max: 37.8 (07-28-22 @ 23:00)  HR: 50 (07-29-22 @ 19:45) (45 - 61)  BP: 132/64 (07-29-22 @ 19:45) (89/53 - 189/81)  RR: 16 (07-29-22 @ 19:45) (13 - 25)  SpO2: 100% (07-29-22 @ 19:45) (94% - 100%)  Wt(kg): --  Height (cm): 177.8 (07-29-22 @ 14:36)  Weight (kg): 61.7 (07-29-22 @ 14:36)  BMI (kg/m2): 19.5 (07-29-22 @ 14:36)  BSA (m2): 1.77 (07-29-22 @ 14:36)      07-28-22 @ 07:01  -  07-29-22 @ 07:00  --------------------------------------------------------  IN: 1900 mL / OUT: 97 mL / NET: 1803 mL    07-29-22 @ 07:01  -  07-29-22 @ 20:46  --------------------------------------------------------  IN: 613.9 mL / OUT: 13 mL / NET: 600.9 mL      Physical Exam:  	Gen: NAD   	HEENT: no acute signs  	Pulm: CTA B/L  	CV: no gallop/ no rub  	Abd: +BS, soft, nontender/nondistended                      Transplant: No tenderness,   	: No suprapubic tenderness  	UE: no acute signs  	LE: no acute signs  	Neuro: conscious, alert, communicative  	Psych: Normal affect and mood  	Skin: Warm, without rashes  Left I/J catheter functioning    LABS/STUDIES  --------------------------------------------------------------------------------              7.9    4.28  >-----------<  420      [07-28-22 @ 23:14]              24.2     132  |  95  |  88  ----------------------------<  213      [07-28-22 @ 23:12]  4.9   |  21  |  3.41        Ca     8.4     [07-28-22 @ 23:12]      Mg     2.3     [07-28-22 @ 23:12]      Phos  4.6     [07-28-22 @ 23:12]    TPro  6.2  /  Alb  2.4  /  TBili  0.4  /  DBili  x   /  AST  92  /  ALT  197  /  AlkPhos  337  [07-28-22 @ 23:12]    PT/INR: PT 14.9 , INR 1.29       [07-28-22 @ 23:14]  PTT: 31.1       [07-28-22 @ 23:14]      Creatinine Trend:  SCr 3.41 [07-28 @ 23:12]  SCr 2.57 [07-28 @ 00:55]  SCr 2.24 [07-27 @ 22:17]  SCr 3.77 [07-27 @ 13:00]  SCr 3.34 [07-26 @ 21:51]      Urinalysis - [07-25-22 @ 18:28]      Color Yellow / Appearance Slightly Turbid / SG 1.020 / pH 5.5      Gluc Negative / Ketone Negative  / Bili Negative / Urobili Negative       Blood Small / Protein 100 / Leuk Est Large / Nitrite Negative      RBC 2 / WBC 40 / Hyaline 5 / Gran  / Sq Epi  / Non Sq Epi 0 / Bacteria Many    Urine Creatinine 129      [07-26-22 @ 04:17]  Urine Sodium 20      [07-26-22 @ 04:17]  Urine Osmolality 387      [07-26-22 @ 04:17]    Iron 17, TIBC 171, %sat 10      [05-02-22 @ 13:03]  Ferritin 6336      [07-27-22 @ 13:00]  PTH -- (Ca 9.2)      [02-05-22 @ 10:13]   294  HbA1c 6.0      [02-21-20 @ 08:53]  TSH 4.69      [07-06-22 @ 18:36]  Lipid: chol --, , HDL --, LDL --      [07-27-22 @ 13:00]    HBsAg Nonreact      [07-25-22 @ 11:51]  HCV 0.18, Nonreact      [07-25-22 @ 11:51]

## 2022-07-29 NOTE — PROGRESS NOTE ADULT - NSPROGADDITIONALINFOA_GEN_ALL_CORE
-Plan discussed with pt/team.  Contact info: [phone] (24/7). pager [phone]  [email or web address] password [email or web address]  Spent 26 minutes assessing pt/labs/meds and discussing plan of care with primary team  Adjusting insulin  Discharge plan  Follow up care

## 2022-07-29 NOTE — PROGRESS NOTE ADULT - ASSESSMENT
66 yr old Male with PMHx ESRD s/p permacath removal 5/10/22 s/p Rt DDRT 4/21/22,  CVA (2019), Afib on apixaban, seizure activity, CAD s/p stents, CABG (2020), HTN, HLD, DM on insulin, gastric/duodenal ulcer, recent hospitalization 4/28/22 -5/10/22 with weakness/anemia found to have perinephric hematoma requiring evacuation and repair of bleeding arterial anastomosis. Curently being treated for UTI with Levaquin Today after developing multiple sz episodes refractory to 5 mg versed IM (EMS) and 2 mg ativan IV in E.D. concerning for status epilepticus requiring intubation for hypoxic respiratory  failure. MICU Consult was called for hypoxic respiratory failure secondary to status epilepticus.  Nephrology consulted for renal failure.     A/P  DDRT on 04/21/22  Course complicated by DGF, was on HD until 4/29/22. Readmitted in May for anemia in the setting of large perinephric hematoma s/p evacuation and repair of arterial anastomosis on 5/2/22. Intra operative biopsy showed no rejection.  ANA was initially sec to  hemodynamic change   stent removal 07/12/22  scr trending up  Febrile, etiology?, being w/u by ID   No hydronephrosis noted on renal US. UA done on 7/25/22 showed LE and pyuria.   allograft US 07/28/22 shows Interval increase in the resistive indices and peak systolic velocities   compared to prior study performed yesterday. Short-term imaging follow-up   if clinically indicated.  Follow BK PCR and adenovirus.   Follow urine and blood cultures.  follow fungal cultures.   Pt with worsening acidosis and oliguric   s/p HD 07/27/22; plan for HD today -  HD per transplant team   Immunosuppression per transplant team  transplant team on board   allograft US, DSA negative   monitor BMP, U/O     HYponatremia   avoid hypotonic solution   optimize glucose   monitor BMP closely     HTN   optimal   manage by transplant team    monitor BP closely

## 2022-07-29 NOTE — PROGRESS NOTE ADULT - SUBJECTIVE AND OBJECTIVE BOX
24 HOUR EVENTS:  -MMF dose decreased to 250 BID  -Meropenam switched to Erbapenam to end 8/3  -Renal Allograft U/S completed, patient with evidence of high RI  -NPO at midnight for possible perc-nephrostomy vs biopsy today    SUBJECTIVE/ROS:  [X] A ten-point review of systems was otherwise negative except as noted.  [ ] Due to altered mental status/intubation, subjective information were not able to be obtained from the patient. History was obtained, to the extent possible, from review of the chart and collateral sources of information.      NEURO  RASS:    GCS:     CAM ICU: +  Exam: awake, alert, and oriented  Meds: acetaminophen     Tablet .. 1000 milliGRAM(s) Oral every 6 hours PRN Temp greater or equal to 38C (100.4F), Mild Pain (1 - 3)  melatonin 6 milliGRAM(s) Oral at bedtime  phenytoin   Capsule 100 milliGRAM(s) Oral every 8 hours    [x] Adequacy of sedation and pain control has been assessed and adjusted      RESPIRATORY  RR: 21 (07-29-22 @ 01:00) (14 - 26)  SpO2: 95% (07-29-22 @ 01:00) (94% - 98%)  Wt(kg): --  Exam: unlabored, clear to auscultation bilaterally  Mechanical Ventilation:     [N/A] Extubation Readiness Assessed  Meds:       CARDIOVASCULAR  HR: 57 (07-29-22 @ 01:00) (54 - 62)  BP: 135/63 (07-29-22 @ 01:00) (109/63 - 190/93)  BP(mean): 91 (07-29-22 @ 01:00) (81 - 132)  ABP: --  ABP(mean): --  Wt(kg): --  CVP(cm H2O): --      Exam: regular rate and rhythm  Cardiac Rhythm: sinus  Perfusion     [x]Adequate   [ ]Inadequate  Mentation   [x]Normal       [ ]Reduced  Extremities  [x]Warm         [ ]Cool  Volume Status [ ]Hypervolemic [x]Euvolemic [ ]Hypovolemic  Meds: carvedilol 25 milliGRAM(s) Oral every 12 hours  doxazosin 4 milliGRAM(s) Oral <User Schedule>  NIFEdipine XL 60 milliGRAM(s) Oral daily        GI/NUTRITION  Exam: soft, nontender, nondistended, incision C/D/I  Diet:  Meds: pantoprazole    Tablet 40 milliGRAM(s) Oral daily  polyethylene glycol 3350 17 Gram(s) Oral daily  senna 2 Tablet(s) Oral at bedtime      GENITOURINARY  I&O's Detail    07-27 @ 07:01  -  07-28 @ 07:00  --------------------------------------------------------  IN:    IV PiggyBack: 350 mL    Oral Fluid: 700 mL    Sodium Bicarbonate: 400 mL  Total IN: 1450 mL    OUT:    Indwelling Catheter - Urethral (mL): 114 mL    Other (mL): 0 mL  Total OUT: 114 mL    Total NET: 1336 mL      07-28 @ 07:01  -  07-29 @ 02:00  --------------------------------------------------------  IN:    IV PiggyBack: 300 mL    Oral Fluid: 1450 mL  Total IN: 1750 mL    OUT:    Indwelling Catheter - Urethral (mL): 75 mL  Total OUT: 75 mL    Total NET: 1675 mL          07-28    132<L>  |  95<L>  |  88<H>  ----------------------------<  213<H>  4.9   |  21<L>  |  3.41<H>    Ca    8.4      28 Jul 2022 23:12  Phos  4.6     07-28  Mg     2.3     07-28    TPro  6.2  /  Alb  2.4<L>  /  TBili  0.4  /  DBili  x   /  AST  92<H>  /  ALT  197<H>  /  AlkPhos  337<H>  07-28    [ ] Miranda catheter, indication: N/A  Meds: sodium bicarbonate 1300 milliGRAM(s) Oral <User Schedule>        HEMATOLOGIC  Meds:   [x] VTE Prophylaxis                        7.9    4.28  )-----------( 420      ( 28 Jul 2022 23:14 )             24.2     PT/INR - ( 28 Jul 2022 23:14 )   PT: 14.9 sec;   INR: 1.29 ratio         PTT - ( 28 Jul 2022 23:14 )  PTT:31.1 sec  Transfusion     [ ] PRBC   [ ] Platelets   [ ] FFP   [ ] Cryoprecipitate      INFECTIOUS DISEASES  WBC Count: 4.28 K/uL (07-28 @ 23:14)    RECENT CULTURES:  Specimen Source: .Blood Blood  Date/Time: 07-27 @ 04:34  Culture Results:   Testing in progress  Gram Stain: --  Organism: --  Specimen Source: Catheterized Catheterized  Date/Time: 07-25 @ 18:27  Culture Results:   50,000 - 99,000 CFU/mL Klebsiella pneumoniae ESBL  Gram Stain: --  Organism: Klebsiella pneumoniae ESBL  Specimen Source: .Blood Blood  Date/Time: 07-25 @ 14:28  Culture Results:   No growth to date.  Gram Stain: --  Organism: --  Specimen Source: .Blood Blood  Date/Time: 07-25 @ 11:59  Culture Results:   No growth to date.  Gram Stain: --  Organism: --    Meds: epoetin cortney-epbx (RETACRIT) Injectable 34720 Unit(s) SubCutaneous every 7 days  ertapenem  IVPB 500 milliGRAM(s) IV Intermittent every 24 hours  fluconAZOLE IVPB 400 milliGRAM(s) IV Intermittent every 24 hours        ENDOCRINE  CAPILLARY BLOOD GLUCOSE      POCT Blood Glucose.: 193 mg/dL (28 Jul 2022 21:39)  POCT Blood Glucose.: 192 mg/dL (28 Jul 2022 17:00)  POCT Blood Glucose.: 136 mg/dL (28 Jul 2022 12:44)  POCT Blood Glucose.: 122 mg/dL (28 Jul 2022 07:37)    Meds: finasteride 5 milliGRAM(s) Oral daily  insulin glargine Injectable (LANTUS) 5 Unit(s) SubCutaneous at bedtime  insulin lispro (ADMELOG) corrective regimen sliding scale   SubCutaneous three times a day with meals  insulin lispro (ADMELOG) corrective regimen sliding scale   SubCutaneous at bedtime  insulin lispro Injectable (ADMELOG) 4 Unit(s) SubCutaneous three times a day before meals  levothyroxine 50 MICROGram(s) Oral daily  predniSONE   Tablet 5 milliGRAM(s) Oral daily        ACCESS DEVICES:  [ ] Peripheral IV  [ ] Central Venous Line	[ ] R	[ ] L	[ ] IJ	[ ] Fem	[ ] SC	Placed:   [ ] Arterial Line		[ ] R	[ ] L	[ ] Fem	[ ] Rad	[ ] Ax	Placed:   [ ] PICC:					[ ] Mediport  [ ] Urinary Catheter, Date Placed:   [x] Necessity of urinary, arterial, and venous catheters discussed    OTHER MEDICATIONS:  chlorhexidine 2% Cloths 1 Application(s) Topical <User Schedule>      CODE STATUS:      IMAGING:    Kidney Allograft U/S  IMPRESSION:    Fullness of the right collecting system with continued urothelial thickening. Correlate with urinalysis and lab values to assess for superimposed infection.    Interval increase in the resistive indices and peak systolic velocities compared to prior study performed yesterday. Short-term imaging follow-up if clinically indicated.    Reidentified small free fluid at the upper pole of the transplant kidney.   24 HOUR EVENTS:  -MMF d/darren  -Meropenam switched to Erbapenam to end 8/3  -Renal Allograft U/S completed, patient with evidence of high RI  -NPO at midnight for possible perc-nephrostomy vs biopsy today    SUBJECTIVE/ROS:  [X] A ten-point review of systems was otherwise negative except as noted.  [ ] Due to altered mental status/intubation, subjective information were not able to be obtained from the patient. History was obtained, to the extent possible, from review of the chart and collateral sources of information.      NEURO  RASS:    GCS:     CAM ICU: +  Exam: awake, alert, and oriented  Meds: acetaminophen     Tablet .. 1000 milliGRAM(s) Oral every 6 hours PRN Temp greater or equal to 38C (100.4F), Mild Pain (1 - 3)  melatonin 6 milliGRAM(s) Oral at bedtime  phenytoin   Capsule 100 milliGRAM(s) Oral every 8 hours    [x] Adequacy of sedation and pain control has been assessed and adjusted      RESPIRATORY  RR: 21 (07-29-22 @ 01:00) (14 - 26)  SpO2: 95% (07-29-22 @ 01:00) (94% - 98%)  Wt(kg): --  Exam: unlabored, clear to auscultation bilaterally  Mechanical Ventilation:     [N/A] Extubation Readiness Assessed  Meds:       CARDIOVASCULAR  HR: 57 (07-29-22 @ 01:00) (54 - 62)  BP: 135/63 (07-29-22 @ 01:00) (109/63 - 190/93)  BP(mean): 91 (07-29-22 @ 01:00) (81 - 132)  ABP: --  ABP(mean): --  Wt(kg): --  CVP(cm H2O): --      Exam: regular rate and rhythm  Cardiac Rhythm: sinus  Perfusion     [x]Adequate   [ ]Inadequate  Mentation   [x]Normal       [ ]Reduced  Extremities  [x]Warm         [ ]Cool  Volume Status [ ]Hypervolemic [x]Euvolemic [ ]Hypovolemic  Meds: carvedilol 25 milliGRAM(s) Oral every 12 hours  doxazosin 4 milliGRAM(s) Oral <User Schedule>  NIFEdipine XL 60 milliGRAM(s) Oral daily        GI/NUTRITION  Exam: soft, nontender, nondistended, incision C/D/I  Diet:  Meds: pantoprazole    Tablet 40 milliGRAM(s) Oral daily  polyethylene glycol 3350 17 Gram(s) Oral daily  senna 2 Tablet(s) Oral at bedtime      GENITOURINARY  I&O's Detail    07-27 @ 07:01  -  07-28 @ 07:00  --------------------------------------------------------  IN:    IV PiggyBack: 350 mL    Oral Fluid: 700 mL    Sodium Bicarbonate: 400 mL  Total IN: 1450 mL    OUT:    Indwelling Catheter - Urethral (mL): 114 mL    Other (mL): 0 mL  Total OUT: 114 mL    Total NET: 1336 mL      07-28 @ 07:01  -  07-29 @ 02:00  --------------------------------------------------------  IN:    IV PiggyBack: 300 mL    Oral Fluid: 1450 mL  Total IN: 1750 mL    OUT:    Indwelling Catheter - Urethral (mL): 75 mL  Total OUT: 75 mL    Total NET: 1675 mL          07-28    132<L>  |  95<L>  |  88<H>  ----------------------------<  213<H>  4.9   |  21<L>  |  3.41<H>    Ca    8.4      28 Jul 2022 23:12  Phos  4.6     07-28  Mg     2.3     07-28    TPro  6.2  /  Alb  2.4<L>  /  TBili  0.4  /  DBili  x   /  AST  92<H>  /  ALT  197<H>  /  AlkPhos  337<H>  07-28    [ ] Miranda catheter, indication: N/A  Meds: sodium bicarbonate 1300 milliGRAM(s) Oral <User Schedule>        HEMATOLOGIC  Meds:   [x] VTE Prophylaxis                        7.9    4.28  )-----------( 420      ( 28 Jul 2022 23:14 )             24.2     PT/INR - ( 28 Jul 2022 23:14 )   PT: 14.9 sec;   INR: 1.29 ratio         PTT - ( 28 Jul 2022 23:14 )  PTT:31.1 sec  Transfusion     [ ] PRBC   [ ] Platelets   [ ] FFP   [ ] Cryoprecipitate      INFECTIOUS DISEASES  WBC Count: 4.28 K/uL (07-28 @ 23:14)    RECENT CULTURES:  Specimen Source: .Blood Blood  Date/Time: 07-27 @ 04:34  Culture Results:   Testing in progress  Gram Stain: --  Organism: --  Specimen Source: Catheterized Catheterized  Date/Time: 07-25 @ 18:27  Culture Results:   50,000 - 99,000 CFU/mL Klebsiella pneumoniae ESBL  Gram Stain: --  Organism: Klebsiella pneumoniae ESBL  Specimen Source: .Blood Blood  Date/Time: 07-25 @ 14:28  Culture Results:   No growth to date.  Gram Stain: --  Organism: --  Specimen Source: .Blood Blood  Date/Time: 07-25 @ 11:59  Culture Results:   No growth to date.  Gram Stain: --  Organism: --    Meds: epoetin cortney-epbx (RETACRIT) Injectable 69482 Unit(s) SubCutaneous every 7 days  ertapenem  IVPB 500 milliGRAM(s) IV Intermittent every 24 hours  fluconAZOLE IVPB 400 milliGRAM(s) IV Intermittent every 24 hours        ENDOCRINE  CAPILLARY BLOOD GLUCOSE      POCT Blood Glucose.: 193 mg/dL (28 Jul 2022 21:39)  POCT Blood Glucose.: 192 mg/dL (28 Jul 2022 17:00)  POCT Blood Glucose.: 136 mg/dL (28 Jul 2022 12:44)  POCT Blood Glucose.: 122 mg/dL (28 Jul 2022 07:37)    Meds: finasteride 5 milliGRAM(s) Oral daily  insulin glargine Injectable (LANTUS) 5 Unit(s) SubCutaneous at bedtime  insulin lispro (ADMELOG) corrective regimen sliding scale   SubCutaneous three times a day with meals  insulin lispro (ADMELOG) corrective regimen sliding scale   SubCutaneous at bedtime  insulin lispro Injectable (ADMELOG) 4 Unit(s) SubCutaneous three times a day before meals  levothyroxine 50 MICROGram(s) Oral daily  predniSONE   Tablet 5 milliGRAM(s) Oral daily        ACCESS DEVICES:  [ ] Peripheral IV  [ ] Central Venous Line	[ ] R	[ ] L	[ ] IJ	[ ] Fem	[ ] SC	Placed:   [ ] Arterial Line		[ ] R	[ ] L	[ ] Fem	[ ] Rad	[ ] Ax	Placed:   [ ] PICC:					[ ] Mediport  [ ] Urinary Catheter, Date Placed:   [x] Necessity of urinary, arterial, and venous catheters discussed    OTHER MEDICATIONS:  chlorhexidine 2% Cloths 1 Application(s) Topical <User Schedule>      CODE STATUS:      IMAGING:    Kidney Allograft U/S  IMPRESSION:    Fullness of the right collecting system with continued urothelial thickening. Correlate with urinalysis and lab values to assess for superimposed infection.    Interval increase in the resistive indices and peak systolic velocities compared to prior study performed yesterday. Short-term imaging follow-up if clinically indicated.    Reidentified small free fluid at the upper pole of the transplant kidney.

## 2022-07-29 NOTE — PRE PROCEDURE NOTE - PRE PROCEDURE EVALUATION
Procedure: Renal transplant and ureteral obstruction evaluation    Indication: Anuria s/p RLQ renal transplant    Clinical History: 66 year old Male with PMHx ESRD s/p PermCath removal 5/10/22 s/p Rt DDRT 4/21/22,  CVA (2019), Afib on apixaban, seizure activity, CAD s/p stents, CABG (2020), HTN, HLD, DM on insulin, gastric/duodenal ulcer, recent hospitalization 4/28/22 -5/10/22 with weakness/anemia found to have perinephric hematoma requiring evacuation and repair of bleeding arterial anastomosis who presented to [facility] for status epilepticus requiring intubation for hypoxic respiratory failure. Now becoming anuric and was previously making urine c/f possible ATN or rejection. IR c/s for biopsy and injection of contrast into the ureter to evaluate for obstruction.    Meds:acetaminophen     Tablet .. 1000 milliGRAM(s) Oral every 6 hours PRN  carvedilol 25 milliGRAM(s) Oral every 12 hours  chlorhexidine 2% Cloths 1 Application(s) Topical <User Schedule>  doxazosin 4 milliGRAM(s) Oral <User Schedule>  epoetin cortney-epbx (RETACRIT) Injectable 38659 Unit(s) SubCutaneous every 7 days  ertapenem  IVPB 500 milliGRAM(s) IV Intermittent every 24 hours  finasteride 5 milliGRAM(s) Oral daily  fluconAZOLE IVPB 400 milliGRAM(s) IV Intermittent every 24 hours  hydrALAZINE Injectable 10 milliGRAM(s) IV Push once  insulin glargine Injectable (LANTUS) 5 Unit(s) SubCutaneous at bedtime  insulin lispro (ADMELOG) corrective regimen sliding scale   SubCutaneous three times a day with meals  insulin lispro (ADMELOG) corrective regimen sliding scale   SubCutaneous at bedtime  insulin lispro Injectable (ADMELOG) 4 Unit(s) SubCutaneous three times a day before meals  levothyroxine 50 MICROGram(s) Oral daily  melatonin 6 milliGRAM(s) Oral at bedtime  NIFEdipine XL 60 milliGRAM(s) Oral daily  pantoprazole    Tablet 40 milliGRAM(s) Oral daily  phenytoin   Capsule 100 milliGRAM(s) Oral every 8 hours  polyethylene glycol 3350 17 Gram(s) Oral daily  predniSONE   Tablet 5 milliGRAM(s) Oral daily  senna 2 Tablet(s) Oral at bedtime  sodium bicarbonate 1300 milliGRAM(s) Oral <User Schedule>      Allergies:No Known Allergies        Labs:                           7.9    4.28  )-----------( 420      ( 28 Jul 2022 23:14 )             24.2     PT/INR - ( 28 Jul 2022 23:14 )   PT: 14.9 sec;   INR: 1.29 ratio         PTT - ( 28 Jul 2022 23:14 )  PTT:31.1 sec  07-28    132<L>  |  95<L>  |  88<H>  ----------------------------<  213<H>  4.9   |  21<L>  |  3.41<H>    Ca    8.4      28 Jul 2022 23:12  Phos  4.6     07-28  Mg     2.3     07-28    TPro  6.2  /  Alb  2.4<L>  /  TBili  0.4  /  DBili  x   /  AST  92<H>  /  ALT  197<H>  /  AlkPhos  337<H>  07-28        Physical Exam:       Anesthesia: Sedation provided by anesthesia department    ASA: None    NPO: YES     Procedure: Renal transplant and ureteral obstruction evaluation    Indication: Anuria s/p RLQ renal transplant    Clinical History: 66 year old Male with PMHx ESRD s/p PermCath removal 5/10/22 s/p Rt DDRT 4/21/22,  CVA (2019), Afib on apixaban, seizure activity, CAD s/p stents, CABG (2020), HTN, HLD, DM on insulin, gastric/duodenal ulcer, recent hospitalization 4/28/22 -5/10/22 with weakness/anemia found to have perinephric hematoma requiring evacuation and repair of bleeding arterial anastomosis who presented to [facility] for status epilepticus requiring intubation for hypoxic respiratory failure. Now becoming anuric and was previously making urine c/f possible ATN or rejection. IR c/s for biopsy and injection of contrast into the ureter to evaluate for obstruction.    Meds:acetaminophen     Tablet .. 1000 milliGRAM(s) Oral every 6 hours PRN  carvedilol 25 milliGRAM(s) Oral every 12 hours  chlorhexidine 2% Cloths 1 Application(s) Topical <User Schedule>  doxazosin 4 milliGRAM(s) Oral <User Schedule>  epoetin cortney-epbx (RETACRIT) Injectable 48829 Unit(s) SubCutaneous every 7 days  ertapenem  IVPB 500 milliGRAM(s) IV Intermittent every 24 hours  finasteride 5 milliGRAM(s) Oral daily  fluconAZOLE IVPB 400 milliGRAM(s) IV Intermittent every 24 hours  hydrALAZINE Injectable 10 milliGRAM(s) IV Push once  insulin glargine Injectable (LANTUS) 5 Unit(s) SubCutaneous at bedtime  insulin lispro (ADMELOG) corrective regimen sliding scale   SubCutaneous three times a day with meals  insulin lispro (ADMELOG) corrective regimen sliding scale   SubCutaneous at bedtime  insulin lispro Injectable (ADMELOG) 4 Unit(s) SubCutaneous three times a day before meals  levothyroxine 50 MICROGram(s) Oral daily  melatonin 6 milliGRAM(s) Oral at bedtime  NIFEdipine XL 60 milliGRAM(s) Oral daily  pantoprazole    Tablet 40 milliGRAM(s) Oral daily  phenytoin   Capsule 100 milliGRAM(s) Oral every 8 hours  polyethylene glycol 3350 17 Gram(s) Oral daily  predniSONE   Tablet 5 milliGRAM(s) Oral daily  senna 2 Tablet(s) Oral at bedtime  sodium bicarbonate 1300 milliGRAM(s) Oral <User Schedule>      Allergies:No Known Allergies        Labs:                           7.9    4.28  )-----------( 420      ( 28 Jul 2022 23:14 )             24.2     PT/INR - ( 28 Jul 2022 23:14 )   PT: 14.9 sec;   INR: 1.29 ratio         PTT - ( 28 Jul 2022 23:14 )  PTT:31.1 sec  07-28    132<L>  |  95<L>  |  88<H>  ----------------------------<  213<H>  4.9   |  21<L>  |  3.41<H>    Ca    8.4      28 Jul 2022 23:12  Phos  4.6     07-28  Mg     2.3     07-28    TPro  6.2  /  Alb  2.4<L>  /  TBili  0.4  /  DBili  x   /  AST  92<H>  /  ALT  197<H>  /  AlkPhos  337<H>  07-28        Physical Exam: NAD      Anesthesia: Sedation provided by anesthesia department    ASA: None    NPO: YES     Procedure: Renal transplant and ureteral obstruction evaluation    Indication: Anuria s/p RLQ renal transplant    Clinical History: 66 year old Male with PMHx ESRD s/p PermCath removal 5/10/22 s/p Rt DDRT 4/21/22,  CVA (2019), Afib on apixaban, seizure activity, CAD s/p stents, CABG (2020), HTN, HLD, DM on insulin, gastric/duodenal ulcer, recent hospitalization 4/28/22 -5/10/22 with weakness/anemia found to have perinephric hematoma requiring evacuation and repair of bleeding arterial anastomosis who presented to [facility] for status epilepticus requiring intubation for hypoxic respiratory failure. Now becoming anuric and was previously making urine c/f possible ATN or rejection. IR c/s for biopsy and injection of contrast into the collection system to evaluate for obstruction.    Meds:acetaminophen     Tablet .. 1000 milliGRAM(s) Oral every 6 hours PRN  carvedilol 25 milliGRAM(s) Oral every 12 hours  chlorhexidine 2% Cloths 1 Application(s) Topical <User Schedule>  doxazosin 4 milliGRAM(s) Oral <User Schedule>  epoetin cortney-epbx (RETACRIT) Injectable 55246 Unit(s) SubCutaneous every 7 days  ertapenem  IVPB 500 milliGRAM(s) IV Intermittent every 24 hours  finasteride 5 milliGRAM(s) Oral daily  fluconAZOLE IVPB 400 milliGRAM(s) IV Intermittent every 24 hours  hydrALAZINE Injectable 10 milliGRAM(s) IV Push once  insulin glargine Injectable (LANTUS) 5 Unit(s) SubCutaneous at bedtime  insulin lispro (ADMELOG) corrective regimen sliding scale   SubCutaneous three times a day with meals  insulin lispro (ADMELOG) corrective regimen sliding scale   SubCutaneous at bedtime  insulin lispro Injectable (ADMELOG) 4 Unit(s) SubCutaneous three times a day before meals  levothyroxine 50 MICROGram(s) Oral daily  melatonin 6 milliGRAM(s) Oral at bedtime  NIFEdipine XL 60 milliGRAM(s) Oral daily  pantoprazole    Tablet 40 milliGRAM(s) Oral daily  phenytoin   Capsule 100 milliGRAM(s) Oral every 8 hours  polyethylene glycol 3350 17 Gram(s) Oral daily  predniSONE   Tablet 5 milliGRAM(s) Oral daily  senna 2 Tablet(s) Oral at bedtime  sodium bicarbonate 1300 milliGRAM(s) Oral <User Schedule>      Allergies:No Known Allergies        Labs:                           7.9    4.28  )-----------( 420      ( 28 Jul 2022 23:14 )             24.2     PT/INR - ( 28 Jul 2022 23:14 )   PT: 14.9 sec;   INR: 1.29 ratio         PTT - ( 28 Jul 2022 23:14 )  PTT:31.1 sec  07-28    132<L>  |  95<L>  |  88<H>  ----------------------------<  213<H>  4.9   |  21<L>  |  3.41<H>    Ca    8.4      28 Jul 2022 23:12  Phos  4.6     07-28  Mg     2.3     07-28    TPro  6.2  /  Alb  2.4<L>  /  TBili  0.4  /  DBili  x   /  AST  92<H>  /  ALT  197<H>  /  AlkPhos  337<H>  07-28        Physical Exam: NAD      Anesthesia: Sedation provided by anesthesia department    ASA: None    NPO: YES

## 2022-07-29 NOTE — PROGRESS NOTE ADULT - SUBJECTIVE AND OBJECTIVE BOX
DIABETES FOLLOW UP NOTE: Saw pt earlier today    Chief Complaint: Endocrine consult requested for management of T2DM    INTERVAL HX: Pt stable in SICU due to sepsis and transaminitis. NPO today for renal biopsy. Noted BG high 100s to low 200s while on present insulin doses. Pt remains weak and tired> hardly talking. Noted worsening creat with anuria> ruling out rejection/obstruction.         Review of Systems:  General: As above  Cardiovascular: No chest pain, palpitations  Respiratory: No SOB, no cough  GI: No nausea, vomiting, abdominal pain  Endocrine: No polyuria, polydipsia or S&Sx of hypoglycemia    Allergies    No Known Allergies    Intolerances      MEDICATIONS:  ertapenem  IVPB 500 milliGRAM(s) IV Intermittent every 24 hours  finasteride 5 milliGRAM(s) Oral daily  fluconAZOLE IVPB 400 milliGRAM(s) IV Intermittent every 24 hours  insulin glargine Injectable (LANTUS) 5 Unit(s) SubCutaneous at bedtime  insulin lispro (ADMELOG) corrective regimen sliding scale   SubCutaneous three times a day with meals  insulin lispro (ADMELOG) corrective regimen sliding scale   SubCutaneous at bedtime  insulin lispro Injectable (ADMELOG) 4 Unit(s) SubCutaneous three times a day before meals  levothyroxine 50 MICROGram(s) Oral daily  predniSONE   Tablet 5 milliGRAM(s) Oral daily          PHYSICAL EXAM:  VITALS: T(C): 36.3 (07-29-22 @ 14:36)  T(F): 97.4 (07-29-22 @ 14:03), Max: 100.2 (07-28-22 @ 16:00)  HR: 52 (07-29-22 @ 14:36) (48 - 61)  BP: 126/59 (07-29-22 @ 14:36) (110/53 - 189/81)  RR:  (13 - 25)  SpO2:  (94% - 100%)  Wt(kg): --  GENERAL: Male laying in bed in NAD  Abdomen: Soft, nontender, non distended  Extremities: Warm, no edema in all 4 exts  NEURO: Alert and able to answer yes/no to questions. Appears tired and closes eyes while talking    LABS:  POCT Blood Glucose.: 189 mg/dL (07-29-22 @ 12:14)  POCT Blood Glucose.: 230 mg/dL (07-29-22 @ 08:05)  POCT Blood Glucose.: 193 mg/dL (07-28-22 @ 21:39)  POCT Blood Glucose.: 192 mg/dL (07-28-22 @ 17:00)  POCT Blood Glucose.: 136 mg/dL (07-28-22 @ 12:44)  POCT Blood Glucose.: 122 mg/dL (07-28-22 @ 07:37)  POCT Blood Glucose.: 145 mg/dL (07-27-22 @ 22:00)  POCT Blood Glucose.: 107 mg/dL (07-27-22 @ 17:15)  POCT Blood Glucose.: 156 mg/dL (07-27-22 @ 12:24)  POCT Blood Glucose.: 163 mg/dL (07-27-22 @ 08:07)  POCT Blood Glucose.: 136 mg/dL (07-26-22 @ 21:18)  POCT Blood Glucose.: 228 mg/dL (07-26-22 @ 17:08)                            7.9    4.28  )-----------( 420      ( 28 Jul 2022 23:14 )             24.2       07-28    132<L>  |  95<L>  |  88<H>  ----------------------------<  213<H>  4.9   |  21<L>  |  3.41<H>    eGFR: 19<L>    Ca    8.4      07-28  Mg     2.3     07-28  Phos  4.6     07-28    TPro  6.2  /  Alb  2.4<L>  /  TBili  0.4  /  DBili  x   /  AST  92<H>  /  ALT  197<H>  /  AlkPhos  337<H>  07-28    Thyroid Function Tests:  07-06 @ 18:36 TSH 4.69 FreeT4 -- T3 43 Anti TPO -- Anti Thyroglobulin Ab -- TSI --      A1C with Estimated Average Glucose Result: 6.0 % (06-30-22 @ 05:49)      Estimated Average Glucose: 126 mg/dL (06-30-22 @ 05:49)        07-27 Chol -- Direct LDL -- LDL calculated -- HDL -- Trig 118

## 2022-07-29 NOTE — PROGRESS NOTE ADULT - ASSESSMENT
66 year old Male with PMHx ESRD s/p PermCath removal 5/10/22 s/p Rt DDRT 4/21/22,  CVA (2019), Afib on apixaban, seizure activity, CAD s/p stents, CABG (2020), HTN, HLD, DM on insulin, gastric/duodenal ulcer, recent hospitalization 4/28/22 -5/10/22 with weakness/anemia found to have perinephric hematoma requiring evacuation and repair of bleeding arterial anastomosis who presented to [facility] for status epilepticus requiring intubation for hypoxic respiratory failure.     Subsequently developed continued seizures and hypothermia -> resolved   Was initiated on empiric antibiotics  s/p LP which was not suggestive of infectious process  Blood, urine and sputum cultures without positive sample    U/A (7/12) 161 WBC  UCx (7/13) 50-99K ESBL Klebsiella   s/p 3 days of Ertapenem    UA (7/25) with 40 WBC  UCx (7/25) 50-99k ESBL Klebsiella     Transaminitis trending down   Fever curve improved   S/p HD via L chest HD cath    RUQ (7/25) with hepatomegaly   CT c/a/p (7/25) with only small perinephric fluid collection, small volume ascites.  Doppler US R Kidney (7/27) with mild thickening of collecting system and ureter and small   Doppler US R Kidney (7/29) with fullness of collecting system and continued urothelial thickening.     RVP (7/25) Negative  CMV PCR (7/22) Negative  EBV PCR (7/25) Negative   HBV PCR (7/25) Negative   HHV-6 PCR (7/25) POSITIVE   Parvovirus IgM and PCR (7/26) Negative   HSV 1/2 PCR (7/26) Negative   Adenovirus PCR (7/26) Negative    #Fever, Transaminitis, Renal Transplant Recipient  --S/p Vancomycin 1g x1 07/25   --Continue Ertapenem 500 mg IV Q24H through 8/3 to complete a 10 day course for ESBL Klebsiella UTI / pyelonephritis  --Doubt that we need Fluconazole at this point  --Continue to follow CBC with diff  --Continue to follow transaminases  --Continue to follow temperature curve  --Follow up on preliminary blood     #Abnormal Laboratory Test (HHV6 PCR)  HHV6 PCR minimally elevated; likely chromosomal integration  --Monitor off of therapy for this indication    All recommendations are tentative pending Attending Attestation.    Raymundo Zamudio MD, PGY4   ID Fellow  [employer] Teams Preferred  After 5pm/weekends call [phone]  66 year old Male with PMHx ESRD s/p PermCath removal 5/10/22 s/p Rt DDRT 4/21/22,  CVA (2019), Afib on apixaban, seizure activity, CAD s/p stents, CABG (2020), HTN, HLD, DM on insulin, gastric/duodenal ulcer, recent hospitalization 4/28/22 -5/10/22 with weakness/anemia found to have perinephric hematoma requiring evacuation and repair of bleeding arterial anastomosis who presented to [facility] for status epilepticus requiring intubation for hypoxic respiratory failure.     Subsequently developed continued seizures and hypothermia -> resolved   Was initiated on empiric antibiotics  s/p LP which was not suggestive of infectious process  Blood, urine and sputum cultures without positive sample    U/A (7/12) 161 WBC  UCx (7/13) 50-99K ESBL Klebsiella   s/p 3 days of Ertapenem    UA (7/25) with 40 WBC  UCx (7/25) 50-99k ESBL Klebsiella     Transaminitis trending down   Fever curve improved   S/p HD via L chest HD cath    RUQ (7/25) with hepatomegaly   CT c/a/p (7/25) with only small perinephric fluid collection, small volume ascites.  Doppler US R Kidney (7/27) with mild thickening of collecting system and ureter and small   Doppler US R Kidney (7/29) with fullness of collecting system and continued urothelial thickening.     RVP (7/25) Negative  CMV PCR (7/22) Negative  EBV PCR (7/25) Negative   HBV PCR (7/25) Negative   HHV-6 PCR (7/25) POSITIVE   Parvovirus IgM and PCR (7/26) Negative   HSV 1/2 PCR (7/26) Negative   Adenovirus PCR (7/26) Negative    #Fever, Transaminitis, Renal Transplant Recipient  --S/p Vancomycin 1g x1 07/25   --Continue Ertapenem 500 mg IV Q24H through 8/3 to complete a 10 day course for ESBL Klebsiella UTI / pyelonephritis  --Doubt that we need Fluconazole at this point   --Continue to follow CBC with diff  --Continue to follow transaminases  --Continue to follow temperature curve  --Follow up on preliminary blood     #Abnormal Laboratory Test (HHV6 PCR)  HHV6 PCR minimally elevated; likely chromosomal integration  --Monitor off of therapy for this indication    All recommendations are tentative pending Attending Attestation.    Raymundo Zamudio MD, PGY4   ID Fellow  [employer] Teams Preferred  After 5pm/weekends call [phone]  66 year old Male with PMHx ESRD s/p PermCath removal 5/10/22 s/p Rt DDRT 4/21/22,  CVA (2019), Afib on apixaban, seizure activity, CAD s/p stents, CABG (2020), HTN, HLD, DM on insulin, gastric/duodenal ulcer, recent hospitalization 4/28/22 -5/10/22 with weakness/anemia found to have perinephric hematoma requiring evacuation and repair of bleeding arterial anastomosis who presented to [facility] for status epilepticus requiring intubation for hypoxic respiratory failure.     Subsequently developed continued seizures and hypothermia -> resolved   Was initiated on empiric antibiotics  s/p LP which was not suggestive of infectious process  Blood, urine and sputum cultures without positive sample    U/A (7/12) 161 WBC  UCx (7/13) 50-99K ESBL Klebsiella   s/p 3 days of Ertapenem    UA (7/25) with 40 WBC  UCx (7/25) 50-99k ESBL Klebsiella     Transaminitis trending down   Fever curve improved   S/p HD via L chest HD cath    RUQ (7/25) with hepatomegaly   CT c/a/p (7/25) with only small perinephric fluid collection, small volume ascites.  Doppler US R Kidney (7/27) with mild thickening of collecting system and ureter and small   Doppler US R Kidney (7/29) with fullness of collecting system and continued urothelial thickening.     RVP (7/25) Negative  CMV PCR (7/22) Negative  EBV PCR (7/25) Negative   HBV PCR (7/25) Negative   HHV-6 PCR (7/25) POSITIVE   Parvovirus IgM and PCR (7/26) Negative   HSV 1/2 PCR (7/26) Negative   Adenovirus PCR (7/26) Negative    #Fever, Transaminitis, Renal Transplant Recipient  --S/p Vancomycin 1g x1 07/25   --Continue Ertapenem 500 mg IV Q24H; anticipate completing at least 10 day course for ESBL Klebsiella UTI / pyelonephritis  --Doubt that we need Fluconazole at this point (would at minimum dose decrease)  --Continue to follow CBC with diff  --Continue to follow transaminases  --Continue to follow temperature curve  --Follow up on preliminary blood     #Abnormal Laboratory Test (HHV6 PCR)  HHV6 PCR minimally elevated; likely chromosomal integration  --Monitor off of therapy for this indication    Raymundo Zamudio MD, PGY4   ID Fellow  [email or web address] Teams Preferred  After 5pm/weekends call [phone]

## 2022-07-29 NOTE — PROGRESS NOTE ADULT - SUBJECTIVE AND OBJECTIVE BOX
Transplant Surgery Multidisciplinary Progress Note   --------------------------------------------------------------  R DCD DDRT    4/21/22    Present:  Patient seen and examined with multidisciplinary team including Transplant Surgeon: Dr. Tena, Transplant Nephrologist: Dr. Alfred, Nephrology Fellow Dr. Thompson, Transplant Pharmacist Michelle Rahman, WALDO Esparza and unit RN during am rounds.  Disciplines not in attendance will be notified of the plan.    HPI: 66M with PMH: DM type II, HTN, CAD s/p CABG in 2020, AFib on Eliquis, CVA in 2019 due to Afib, Seizure d/o following CVA, last episode was on 4/8/22, h/o GIB in 2/2022 EGD with duodenal ulcer.  ESRD due to DM was on HD since 2019.     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  Donor was 58 , KDPI 82%, DCD, single vessels and ureter, HLA mismatch 1, 2, 2. No DSA, cPRA 0%. 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 Re-admitted in May for anemia in the setting of large perinephric hematoma s/p evacuation and repair of arterial anastomosis on 5/2/22. Intra operative biopsy showed no rejection, Creatinine ranging ~2mg/dL.    In Rehab:  He was recently dx with klebsiella UTI rx with ertapenem - then switched to Levaquin day #6.    He also had parainfluenza pneumonia. Was at rehab progressing well.      Hospital course:  - 6/10: transferred from rehab facility for seizure status epilepticus. Intubated for respiratory protection and transferred to MICU.  EEG showed no seizure activity. Neuro consulted.   - 6/11: Extubated. Found to have R pleural effusion. s/p Thoracentesis 1.3L. Eliquis changed to heparin drip.  Post procedure drop in H&H. 5u PRBC, 2 u FFP.   - 6/11 evening: Transferred to SICU from MICU for further care in setting of hypoxia, significant R pleural effusion, anemia and hemodynamic instability  - 6/11 Intubated at 9pm and sedated on Precedex.  CT placed, 600ml blood drained.  1L total overnight, started pressors  - 6/11 Received Protamine for PTT >100 (was on Heparin drip started for AF), 2 u FFP and 5u PRBC total  - 6/13 s/p VATS 2.2L old blood removed; second chest tube placed  - 6/18-19: chest tubes removed  - 6/21: ?PRES vs. chronic ischemic changes on MRI, off Envarsus, switched to Belatacept 6/23 with good graft function  - 6/25: Tx to SICU for refractory seizures, improved with IV antiepileptic regimen  - 7/10: Passed bedside S&S in SICU. Downgraded from SICU to floor.   - 7/11: OR-->URETERAL STENT REMOVED  - 7/15: ESBL Kleb UTI (50-90K), completed Ertapenem x 3 Days  - 7/25: Tx to SICU for Sepsis/ elevated LFTS    Interval Events:  -   Low grade fever 38.4  - s/p HD yesterday, no fluid removed   - Renal US:  no hydro   - Urine cxs: kleb pneumo, bld cxs NGTD        Immunosuppression:   Induction:  Thymoglobulin                                        Maintenance:  - Belatacept: 6/23, 7/4, 7/8, 7/18. Next dose 8/1  - MMF held (was on 500 BID leukopenia), Pred 5mg daily   - Ongoing monitoring for signs of rejection.    Potential Discharge date: pending clinical improvement   Education:  Medications  Plan of care:  See Below    MEDICATIONS  (STANDING):  carvedilol 25 milliGRAM(s) Oral every 12 hours  chlorhexidine 2% Cloths 1 Application(s) Topical <User Schedule>  doxazosin 4 milliGRAM(s) Oral <User Schedule>  epoetin cortney-epbx (RETACRIT) Injectable 85495 Unit(s) SubCutaneous every 7 days  ertapenem  IVPB 500 milliGRAM(s) IV Intermittent every 24 hours  finasteride 5 milliGRAM(s) Oral daily  fluconAZOLE IVPB 400 milliGRAM(s) IV Intermittent every 24 hours  insulin glargine Injectable (LANTUS) 5 Unit(s) SubCutaneous at bedtime  insulin lispro (ADMELOG) corrective regimen sliding scale   SubCutaneous three times a day with meals  insulin lispro (ADMELOG) corrective regimen sliding scale   SubCutaneous at bedtime  insulin lispro Injectable (ADMELOG) 4 Unit(s) SubCutaneous three times a day before meals  levothyroxine 50 MICROGram(s) Oral daily  melatonin 6 milliGRAM(s) Oral at bedtime  NIFEdipine XL 60 milliGRAM(s) Oral daily  pantoprazole    Tablet 40 milliGRAM(s) Oral daily  phenytoin   Capsule 100 milliGRAM(s) Oral every 8 hours  polyethylene glycol 3350 17 Gram(s) Oral daily  predniSONE   Tablet 5 milliGRAM(s) Oral daily  senna 2 Tablet(s) Oral at bedtime  sodium bicarbonate 1300 milliGRAM(s) Oral <User Schedule>    PAST MEDICAL & SURGICAL HISTORY:  Hypertension  Diabetes  Dyslipidemia  CAD (Coronary Artery Disease)  with Stents in 06/2009 and 6/2019, s/p off-pump C3L on 8/13/20  Hypothyroidism  CVA (cerebral vascular accident)  12/13/19 with residual bilateral weakness  Anemia  PEG (percutaneous endoscopic gastrostomy) status  removed July 2020  Intubation of airway performed without difficulty  Dec 2019  CVA c/b status epilepticus requiring intubation and PEG in 12/2019-  ESRD on dialysis  M/W/F  Seizures  after CVA, last seizure was last week 4/07/22  History of insertion of stent into coronary artery bypass graft  2009 and 2019  S/P CABG x 3  off pump C3L on 8/13/20    Vital Signs Last 24 Hrs  T(C): 37.8 (28 Jul 2022 12:00), Max: 38 (28 Jul 2022 06:47)  T(F): 100 (28 Jul 2022 12:00), Max: 100.4 (28 Jul 2022 06:47)  HR: 62 (28 Jul 2022 14:00) (52 - 62)  BP: 136/62 (28 Jul 2022 14:00) (109/63 - 190/93)  BP(mean): 89 (28 Jul 2022 14:00) (80 - 132)  RR: 19 (28 Jul 2022 14:00) (14 - 28)  SpO2: 97% (28 Jul 2022 14:00) (94% - 98%)    I&O's Summary    27 Jul 2022 07:01  -  28 Jul 2022 07:00  --------------------------------------------------------  IN: 1450 mL / OUT: 114 mL / NET: 1336 mL    28 Jul 2022 07:01  -  28 Jul 2022 16:03  --------------------------------------------------------  IN: 780 mL / OUT: 40 mL / NET: 740 mL                        7.6    2.88  )-----------( 370      ( 28 Jul 2022 00:55 )             23.0     07-28    133<L>  |  96  |  67<H>  ----------------------------<  149<H>  4.8   |  22  |  2.57<H>    Ca    8.2<L>      28 Jul 2022 00:55  Phos  4.2     07-28  Mg     1.9     07-28    TPro  6.2  /  Alb  2.5<L>  /  TBili  0.4  /  DBili  x   /  AST  122<H>  /  ALT  256<H>  /  AlkPhos  287<H>  07-28    Culture - Blood (collected 07-27-22 @ 04:34)  Source: .Blood Blood-Peripheral  Preliminary Report (07-28-22 @ 07:01):    No growth to date.    Culture - Blood (collected 07-27-22 @ 04:34)  Source: .Blood Blood-Peripheral  Preliminary Report (07-28-22 @ 07:01):    No growth to date.    Culture - Fungal, Blood (collected 07-27-22 @ 04:34)  Source: .Blood Blood  Preliminary Report (07-28-22 @ 10:47):    Testing in progress    Culture - Urine (collected 07-25-22 @ 18:27)  Source: Catheterized Catheterized  Final Report (07-28-22 @ 09:06):    50,000 - 99,000 CFU/mL Klebsiella pneumoniae ESBL  Organism: Klebsiella pneumoniae ESBL (07-28-22 @ 09:06)  Organism: Klebsiella pneumoniae ESBL (07-28-22 @ 09:06)    Culture - Blood (collected 07-25-22 @ 14:28)  Source: .Blood Blood  Preliminary Report (07-26-22 @ 22:01):    No growth to date.    Culture - Blood (collected 07-25-22 @ 11:59)  Source: .Blood Blood  Preliminary Report (07-26-22 @ 15:02):    No growth to date.      REVIEW OF SYSTEMS  ------------------------------------  unable to obtain     PHYSICAL EXAM:  Constitutional: Frail looking   Eyes: Anicteric, PERRLA  ENMT: nc/at,   Neck: supple  Respiratory: CTA   Cardiovascular: RRR  Gastrointestinal: Soft, non distended, NT, RLQ incision healed   Genitourinary: tucker insitu   Extremities: SCD's in place and working bilaterally, overall edema has improved  Vascular: Palpable dp pulses bilaterally  Neurological: AAOx3; sleepy/ arousable   Skin: no rashes, ulcerations or lesions  Musculoskeletal: Moving all extremities, global weakness  Psychiatric: calm, follows commands and interacts appropriately

## 2022-07-29 NOTE — PROGRESS NOTE ADULT - SUBJECTIVE AND OBJECTIVE BOX
Follow Up:      Interval History/ROS:Patient is a 65 yo Male who presents with a chief complaint of Status Epilepticus (29 Jul 2022 01:59)    Fever curve improved/resolved. No acute events overnight. Plan for potential ? biopsy today of transplanted kidney?     REVIEW OF SYSTEMS  As noted above    Allergies  No Known Allergies    ANTIMICROBIALS:    ertapenem  IVPB 500 every 24 hours  fluconAZOLE IVPB 400 every 24 hours    OTHER MEDS: MEDICATIONS  (STANDING):  acetaminophen     Tablet .. 1000 every 6 hours PRN  carvedilol 25 every 12 hours  doxazosin 4 <User Schedule>  epoetin cortney-epbx (RETACRIT) Injectable 88572 every 7 days  finasteride 5 daily  hydrALAZINE Injectable 10 once  insulin glargine Injectable (LANTUS) 5 at bedtime  insulin lispro (ADMELOG) corrective regimen sliding scale  three times a day with meals  insulin lispro (ADMELOG) corrective regimen sliding scale  at bedtime  insulin lispro Injectable (ADMELOG) 4 three times a day before meals  levothyroxine 50 daily  melatonin 6 at bedtime  NIFEdipine XL 60 daily  pantoprazole    Tablet 40 daily  phenytoin   Capsule 100 every 8 hours  polyethylene glycol 3350 17 daily  predniSONE   Tablet 5 daily  senna 2 at bedtime    Vital Signs Last 24 Hrs  T(F): 98.6 (07-29-22 @ 07:00), Max: 101.8 (07-26-22 @ 03:00)    Vital Signs Last 24 Hrs  HR: 53 (07-29-22 @ 09:00) (52 - 62)  BP: 189/81 (07-29-22 @ 09:00) (109/63 - 189/81)  RR: 18 (07-29-22 @ 09:00)  SpO2: 100% (07-29-22 @ 09:00) (94% - 100%)  Wt(kg): --    EXAM:  General: Alert and Awake, NAD  Cardiac: RRR, No M/R/G  Resp: CTAB, No Wh/Rh/Ra. L chest HD catheter in place   Abdomen: NBS, NT/ND, No HSM, No guarding  MSK: No LE edema. No Calf tenderness  : Miranda  Skin: No rashes or lesions. Skin is warm and dry to the touch.   Neuro: Alert and Awake. CN 2-12 Grossly intact. Moves all four extremities spontaneously.  Psych: Calm, Pleasant, Cooperative    Labs:                        7.9    4.28  )-----------( 420      ( 28 Jul 2022 23:14 )             24.2     07-28    132<L>  |  95<L>  |  88<H>  ----------------------------<  213<H>  4.9   |  21<L>  |  3.41<H>    Ca    8.4      28 Jul 2022 23:12  Phos  4.6     07-28  Mg     2.3     07-28    TPro  6.2  /  Alb  2.4<L>  /  TBili  0.4  /  DBili  x   /  AST  92<H>  /  ALT  197<H>  /  AlkPhos  337<H>  07-28    WBC Trend:  WBC Count: 4.28 (07-28-22 @ 23:14)  WBC Count: 2.88 (07-28-22 @ 00:55)  WBC Count: 2.82 (07-27-22 @ 22:17)  WBC Count: 2.97 (07-27-22 @ 13:00)    Creatine Trend:  Creatinine, Serum: 3.41 (07-28)  Creatinine, Serum: 2.57 (07-28)  Creatinine, Serum: 2.24 (07-27)  Creatinine, Serum: 3.77 (07-27)    Liver Biochemical Testing Trend:  Alanine Aminotransferase (ALT/SGPT): 197 *H* (07-28)  Alanine Aminotransferase (ALT/SGPT): 256 *H* (07-28)  Alanine Aminotransferase (ALT/SGPT): 259 *H* (07-27)  Alanine Aminotransferase (ALT/SGPT): 293 *H* (07-27)  Alanine Aminotransferase (ALT/SGPT): 336 *H* (07-26)  Aspartate Aminotransferase (AST/SGOT): 92 (07-28-22 @ 23:12)  Aspartate Aminotransferase (AST/SGOT): 122 (07-28-22 @ 00:55)  Aspartate Aminotransferase (AST/SGOT): 110 (07-27-22 @ 22:17)  Aspartate Aminotransferase (AST/SGOT): 136 (07-27-22 @ 13:00)  Aspartate Aminotransferase (AST/SGOT): 144 (07-26-22 @ 21:51)  Bilirubin Total, Serum: 0.4 (07-28)  Bilirubin Total, Serum: 0.4 (07-28)  Bilirubin Total, Serum: 0.4 (07-27)  Bilirubin Direct, Serum: 0.2 (07-27)  Bilirubin Total, Serum: 0.4 (07-27)    Trend LDH  07-25-22 @ 06:30  655<H>  06-11-22 @ 18:47  223  05-02-22 @ 10:05  246<H>  04-30-22 @ 07:15  260<H>  02-03-22 @ 18:58  179    MICROBIOLOGY:  MRSA PCR Result.: NotDetec (07-26-22 @ 03:47)  MRSA PCR Result.: NotDetec (07-13-22 @ 21:02)  MRSA PCR Result.: NotDetec (06-26-22 @ 11:25)    Culture - Blood (collected 27 Jul 2022 04:34)  Source: .Blood Blood-Peripheral  Preliminary Report:    No growth to date.    Culture - Blood (collected 27 Jul 2022 04:34)  Source: .Blood Blood-Peripheral  Preliminary Report:    No growth to date.    Culture - Fungal, Blood (collected 27 Jul 2022 04:34)  Source: .Blood Blood  Preliminary Report:    Testing in progress    Culture - Urine (collected 25 Jul 2022 18:27)  Source: Catheterized Catheterized  Final Report:    50,000 - 99,000 CFU/mL Klebsiella pneumoniae ESBL  Organism: Klebsiella pneumoniae ESBL  Organism: Klebsiella pneumoniae ESBL    Sensitivities:      -  Amikacin: S <=16      -  Amoxicillin/Clavulanic Acid: S <=8/4      -  Ampicillin: R >16 These ampicillin results predict results for amoxicillin      -  Ampicillin/Sulbactam: I 16/8 Enterobacter, Klebsiella aerogenes, Citrobacter, and Serratia may develop resistance during prolonged therapy (3-4 days)      -  Aztreonam: R 16      -  Cefazolin: R >16 (MIC_CL_COM_ENTERIC_CEFAZU) For uncomplicated UTI with K. pneumoniae, E. coli, or P. mirablis: TALITA <=16 is sensitive and TALITA >=32 is resistant. This also predicts results for oral agents cefaclor, cefdinir, cefpodoxime, cefprozil, cefuroxime axetil, cephalexin and locarbef for uncomplicated UTI. Note that some isolates may be susceptible to these agents while testing resistant to cefazolin.      -  Cefepime: R 8      -  Ceftriaxone: R >32 Enterobacter, Klebsiella aerogenes, Citrobacter, and Serratia may develop resistance during prolonged therapy      -  Ciprofloxacin: R 1      -  Ertapenem: S <=0.5      -  Gentamicin: R >8      -  Imipenem: S <=1      -  Levofloxacin: I 1      -  Meropenem: S <=1      -  Nitrofurantoin: S <=32 Should not be used to treat pyelonephritis      -  Piperacillin/Tazobactam: S <=8      -  Tigecycline: S <=2      -  Tobramycin: I 8      -  Trimethoprim/Sulfamethoxazole: R >2/38      Method Type: TALITA    Culture - Blood (collected 25 Jul 2022 14:28)  Source: .Blood Blood  Preliminary Report:    No growth to date.    Culture - Blood (collected 25 Jul 2022 11:59)  Source: .Blood Blood  Preliminary Report:    No growth to date.    Culture - Blood (collected 15 Jul 2022 08:36)  Source: .Blood Blood  Final Report:    No Growth Final    Culture - Blood (collected 15 Jul 2022 07:15)  Source: .Blood Blood  Final Report:    No Growth Final    Culture - Urine (collected 13 Jul 2022 00:58)  Source: Clean Catch Clean Catch (Midstream)  Final Report:    50,000 - 99,000 CFU/mL Klebsiella pneumoniae ESBL  Organism: Klebsiella pneumoniae ESBL  Organism: Klebsiella pneumoniae ESBL    Sensitivities:      -  Amikacin: S <=16      -  Amoxicillin/Clavulanic Acid: S <=8/4 Consider reserving for cystitis when ampicillin/sulbactam is resistant      -  Ampicillin: R >16 These ampicillin results predict results for amoxicillin      -  Ampicillin/Sulbactam: R >16/8 Enterobacter, Klebsiella aerogenes, Citrobacter, and Serratia may develop resistance during prolonged therapy (3-4 days)      -  Aztreonam: R >16      -  Cefazolin: R >16 (MIC_CL_COM_ENTERIC_CEFAZU) For uncomplicated UTI with K. pneumoniae, E. coli, or P. mirablis: TALITA <=16 is sensitive and TALITA >=32 is resistant. This also predicts results for oral agents cefaclor, cefdinir, cefpodoxime, cefprozil, cefuroxime axetil, cephalexin and locarbef for uncomplicated UTI. Note that some isolates may be susceptible to these agents while testing resistant to cefazolin.      -  Cefepime: R >16      -  Ceftriaxone: R >32 Enterobacter, Klebsiella aerogenes, Citrobacter, and Serratia may develop resistance during prolonged therapy      -  Ciprofloxacin: R 1      -  Ertapenem: S <=0.5      -  Gentamicin: R >8      -  Imipenem: S <=1      -  Levofloxacin: S <=0.5      -  Meropenem: S <=1      -  Nitrofurantoin: R >64 Should not be used to treat pyelonephritis      -  Piperacillin/Tazobactam: S <=8      -  Tigecycline: S <=2      -  Tobramycin: I 8      -  Trimethoprim/Sulfamethoxazole: R >2/38      Method Type: TALITA    Culture - Fungal, CSF (collected 29 Jun 2022 13:09)  Source: .CSF CSF  Preliminary Report:    No growth    HIV-1/2 Combo Result: Nonreact (04-21-22 @ 14:47)  HIV-1/2 Combo Result: Nonreact (08-09-20 @ 19:26)    Fungitell: <31 pg/mL (07-25 @ 18:32)  Rapid RVP Result: NotDetec (07-25 @ 15:15)    COVID-19 PCR: NotDetec (07-25-22 @ 11:51)  COVID-19 PCR: NotDetec (07-23-22 @ 07:55)  COVID-19 PCR: NotDetec (07-17-22 @ 07:54)  COVID-19 PCR: NotDetec (07-10-22 @ 16:11)  COVID-19 PCR: NotDetec (07-05-22 @ 06:03)  COVID-19 PCR: NotDetec (06-23-22 @ 07:01)  COVID-19 PCR: NotDetec (06-14-22 @ 12:44)    Procalcitonin, Serum: 1.40 (07-28)  Procalcitonin, Serum: 1.50 (07-28)  Procalcitonin, Serum: 1.51 (07-27)  Procalcitonin, Serum: 1.67 (07-26)  Procalcitonin, Serum: 0.88 (07-25)    Ferritin, Serum: 6336 (07-27)    Lactate Dehydrogenase, Serum: 655 (07-25)    RADIOLOGY:  imaging below personally reviewed    < from: US Trans Kidney w/ Doppler, Right (07.28.22 @ 22:54) >  IMPRESSION:    Fullness of the right collecting system with continued urothelial   thickening. Correlate with urinalysis and lab values to assess for   superimposed infection.    Interval increase in the resistive indices and peak systolic velocities   compared to prior study performed yesterday. Short-term imaging follow-up   if clinically indicated.    Reidentified small free fluid at the upper pole of the transplant kidney.      --- End of Report ---    < end of copied text >

## 2022-07-29 NOTE — PROGRESS NOTE ADULT - ATTENDING COMMENTS
65 yo m, s/p DDKT, now in SICU for ANA, elevated LFTs, AMS.  - MS improving. Continue phenytoin.  - RA sat >90%.  - Off pressors.   - NPO for procedures.  - HD today, 1U PRBC.  - IR for potential nephrostomy and biopsy.  - Net 1.8L. UF 1.0L.  - Hgb 7.9. Monitor.  - Continue ertapenem and fluconazole. Urine ESBL.  - Monitor cultures.  - ISS.

## 2022-07-30 NOTE — PROGRESS NOTE ADULT - SUBJECTIVE AND OBJECTIVE BOX
Transplant Surgery - Multidisciplinary Rounds  --------------------------------------------------------------  DDRT    Date: 4/21/2022            66M with PMH: DM type II, HTN, CAD s/p CABG in 2020, AFib on Eliquis, CVA in 2019 due to Afib, Seizure d/o following CVA, last episode was on 4/8/22, h/o GIB in 2/2022 EGD with duodenal ulcer.  ESRD due to DM was on HD since 2019.     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  Donor was 58 , KDPI 82%, DCD, single vessels and ureter, HLA mismatch 1, 2, 2. No DSA, cPRA 0%. 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 Re-admitted in May for anemia in the setting of large perinephric hematoma s/p evacuation and repair of arterial anastomosis on 5/2/22. Intra operative biopsy showed no rejection, Creatinine ranging ~2mg/dL.    In Rehab:  He was recently dx with klebsiella UTI rx with ertapenem - then switched to Levaquin day #6.    He also had parainfluenza pneumonia. Was at rehab progressing well.      Hospital course:  - 6/10: transferred from rehab facility for seizure status epilepticus. Intubated for respiratory protection and transferred to MICU.  EEG showed no seizure activity. Neuro consulted.   - 6/11: Extubated. Found to have R pleural effusion. s/p Thoracentesis 1.3L. Eliquis changed to heparin drip.  Post procedure drop in H&H. 5u PRBC, 2 u FFP.   - 6/11 evening: Transferred to SICU from MICU for further care in setting of hypoxia, significant R pleural effusion, anemia and hemodynamic instability  - 6/11 Intubated at 9pm and sedated on Precedex.  CT placed, 600ml blood drained.  1L total overnight, started pressors  - 6/11 Received Protamine for PTT >100 (was on Heparin drip started for AF), 2 u FFP and 5u PRBC total  - 6/13 s/p VATS 2.2L old blood removed; second chest tube placed  - 6/18-19: chest tubes removed  - 6/21: ?PRES vs. chronic ischemic changes on MRI, off Envarsus, switched to Belatacept 6/23 with good graft function  - 6/25: Tx to SICU for refractory seizures, improved with IV antiepileptic regimen  - 7/10: Passed bedside S&S in SICU. Downgraded from SICU to floor.   - 7/11: OR-->URETERAL STENT REMOVED  - 7/15: ESBL Kleb UTI (50-90K), completed Ertapenem x 3 Days  - 7/25: Tx to SICU for Sepsis/ elevated LFTS  - 7/27: Shiley placed for HD  - 7/28: ongoing fevers -> started Ertapenem   - 7/29: HD + 1 pRBC  - 7/30: febrile again, cultures sent     Present:   Patient seen and examined with multidisciplinary team including Transplant Surgeon: Dr. Tena, Transplant Nephrologist: Dr. Alfred, resident: Dr. Prashant Acosta, and unit RN during am rounds.  Disciplines not in attendance will be notified of the plan.     Interval Events: Febrile overnight, and cultures sent. Afebrile by morning. No pressors required.     Induction:  Thymoglobulin                                        Maintenance:  - Belatacept: 6/23, 7/4, 7/8, 7/18. Next dose 8/1  - MMF held (was on 500 BID leukopenia), Pred 5mg daily   - Ongoing monitoring for signs of rejection.    Potential Discharge date: pending clinical improvement.     Education:  Medications    Plan of care:  See Below    MEDICATIONS  (STANDING):  carvedilol 25 milliGRAM(s) Oral every 12 hours  chlorhexidine 2% Cloths 1 Application(s) Topical <User Schedule>  doxazosin 4 milliGRAM(s) Oral <User Schedule>  epoetin cortney-epbx (RETACRIT) Injectable 42238 Unit(s) SubCutaneous every 7 days  ertapenem  IVPB 500 milliGRAM(s) IV Intermittent every 24 hours  finasteride 5 milliGRAM(s) Oral daily  fluconAZOLE IVPB 200 milliGRAM(s) IV Intermittent every 24 hours  insulin glargine Injectable (LANTUS) 5 Unit(s) SubCutaneous at bedtime  insulin lispro (ADMELOG) corrective regimen sliding scale   SubCutaneous three times a day with meals  insulin lispro (ADMELOG) corrective regimen sliding scale   SubCutaneous at bedtime  insulin lispro Injectable (ADMELOG) 4 Unit(s) SubCutaneous three times a day before meals  levothyroxine 50 MICROGram(s) Oral daily  melatonin 6 milliGRAM(s) Oral at bedtime  methylPREDNISolone sodium succinate IVPB 500 milliGRAM(s) IV Intermittent two times a day  mycophenolate mofetil 1000 milliGRAM(s) Oral two times a day  NIFEdipine XL 60 milliGRAM(s) Oral daily  pantoprazole    Tablet 40 milliGRAM(s) Oral daily  phenytoin   Capsule 100 milliGRAM(s) Oral every 8 hours  polyethylene glycol 3350 17 Gram(s) Oral daily  senna 2 Tablet(s) Oral at bedtime  sodium bicarbonate 1300 milliGRAM(s) Oral <User Schedule>    MEDICATIONS  (PRN):  acetaminophen     Tablet .. 1000 milliGRAM(s) Oral every 6 hours PRN Temp greater or equal to 38C (100.4F), Mild Pain (1 - 3)      PAST MEDICAL & SURGICAL HISTORY:  Hypertension  Diabetes  Dyslipidemia  CAD (Coronary Artery Disease) with Stents in 06/2009 and 6/2019, s/p off-pump C3L on 8/13/20  Hypothyroidism  CVA (cerebral vascular accident) 12/13/19 with residual bilateral weakness  Anemia  PEG (percutaneous endoscopic gastrostomy) status removed July 2020  Intubation of airway performed without difficulty Dec 2019  CVA c/b status epilepticus requiring intubation and PEG in 12/2019-  ESRD on dialysis M/W/F  Seizures after CVA, last seizure was last week 4/07/22  History of insertion of stent into coronary artery bypass graft 2009 and 2019  S/P CABG x 3 off pump C3L on 8/13/20      Vital Signs Last 24 Hrs  T(C): 38.3 (30 Jul 2022 16:05), Max: 38.6 (30 Jul 2022 13:00)  T(F): 100.9 (30 Jul 2022 16:05), Max: 101.5 (30 Jul 2022 13:00)  HR: 61 (30 Jul 2022 16:05) (46 - 65)  BP: 163/70 (30 Jul 2022 16:05) (92/50 - 186/78)  BP(mean): 109 (30 Jul 2022 15:00) (67 - 118)  RR: 24 (30 Jul 2022 16:05) (12 - 29)  SpO2: 92% (30 Jul 2022 16:05) (92% - 100%)    Parameters below as of 30 Jul 2022 16:05  Patient On (Oxygen Delivery Method): room air      I&O's Summary    29 Jul 2022 07:01  -  30 Jul 2022 07:00  --------------------------------------------------------  IN: 813.9 mL / OUT: 48 mL / NET: 765.9 mL    30 Jul 2022 07:01  -  30 Jul 2022 16:23  --------------------------------------------------------  IN: 580 mL / OUT: 20 mL / NET: 560 mL                          8.3    4.88  )-----------( 388      ( 29 Jul 2022 21:14 )             25.5     07-29    137  |  98  |  51<H>  ----------------------------<  149<H>  3.9   |  26  |  2.65<H>    Ca    7.9<L>      29 Jul 2022 21:14  Phos  3.2     07-29  Mg     1.9     07-29    TPro  5.8<L>  /  Alb  2.4<L>  /  TBili  0.4  /  DBili  x   /  AST  60<H>  /  ALT  141<H>  /  AlkPhos  308<H>  07-29      Review of systems: patient answering questions, but unable to complete detailed ROS. Denies abdominal pain, chills, nausea, or vomiting.       PHYSICAL EXAM:  Constitutional: awake, reduced alertness  Eyes: Anicteric  ENMT: nc/at  Respiratory: not tachypneic, non-labored on RA    Cardiovascular: normotensive, regular rate on monitor   Gastrointestinal: Soft, non distended, nontender   Genitourinary: Urinary catheter in place with scant urine in tube   Extremities: SCD's in place and working bilaterally  Vascular: Palpable dp pulses bilaterally  Neurological: oriented to year, place, and self; answering simple questions, but slowly   Skin: no rashes, ulcerations or lesions  Musculoskeletal: Moving all extremities  Psychiatric: Responsive

## 2022-07-30 NOTE — PROGRESS NOTE ADULT - SUBJECTIVE AND OBJECTIVE BOX
[facility]   MD NINA MASON MD KRISTINE SOLTANPOUR, WALDO PALMA    TEL:  OFFICE: [phone]  From 5pm-7am Answering Service [phone]    -- RENAL FOLLOW UP NOTE ---Date of Service 07-30-22 @ 21:36    Patient is a 66y old  Male who presents with a chief complaint of Status Epilepticus (30 Jul 2022 16:22)      Patient seen and examined at bedside. No chest pain/sob    VITALS:  T(F): 98.8 (07-30-22 @ 19:25), Max: 101.5 (07-30-22 @ 13:00)  HR: 53 (07-30-22 @ 21:00)  BP: 130/63 (07-30-22 @ 21:00)  RR: 20 (07-30-22 @ 21:00)  SpO2: 94% (07-30-22 @ 21:00)  Wt(kg): --    07-29 @ 07:01  -  07-30 @ 07:00  --------------------------------------------------------  IN: 813.9 mL / OUT: 48 mL / NET: 765.9 mL    07-30 @ 07:01  -  07-30 @ 21:36  --------------------------------------------------------  IN: 580 mL / OUT: 1540 mL / NET: -960 mL          PHYSICAL EXAM:  General: NAD  Neck: No JVD  Respiratory: CTAB, no wheezes, rales or rhonchi  Cardiovascular: S1, S2, RRR  Gastrointestinal: BS+, soft, NT/ND  Extremities: No peripheral edema    Hospital Medications:   MEDICATIONS  (STANDING):  carvedilol 25 milliGRAM(s) Oral every 12 hours  chlorhexidine 2% Cloths 1 Application(s) Topical <User Schedule>  doxazosin 4 milliGRAM(s) Oral <User Schedule>  epoetin cortney-epbx (RETACRIT) Injectable 50541 Unit(s) SubCutaneous every 7 days  ertapenem  IVPB 500 milliGRAM(s) IV Intermittent every 24 hours  finasteride 5 milliGRAM(s) Oral daily  fluconAZOLE IVPB 200 milliGRAM(s) IV Intermittent every 24 hours  insulin glargine Injectable (LANTUS) 5 Unit(s) SubCutaneous at bedtime  insulin lispro (ADMELOG) corrective regimen sliding scale   SubCutaneous three times a day with meals  insulin lispro (ADMELOG) corrective regimen sliding scale   SubCutaneous at bedtime  insulin lispro Injectable (ADMELOG) 4 Unit(s) SubCutaneous three times a day before meals  levothyroxine 50 MICROGram(s) Oral daily  melatonin 6 milliGRAM(s) Oral at bedtime  mycophenolate mofetil 1000 milliGRAM(s) Oral every 12 hours  NIFEdipine XL 60 milliGRAM(s) Oral daily  pantoprazole    Tablet 40 milliGRAM(s) Oral daily  phenytoin   Capsule 100 milliGRAM(s) Oral every 8 hours  polyethylene glycol 3350 17 Gram(s) Oral daily  senna 2 Tablet(s) Oral at bedtime  sodium bicarbonate 1300 milliGRAM(s) Oral <User Schedule>      LABS:  07-29    137  |  98  |  51<H>  ----------------------------<  149<H>  3.9   |  26  |  2.65<H>    Ca    7.9<L>      29 Jul 2022 21:14  Phos  3.2     07-29  Mg     1.9     07-29    TPro  5.8<L>  /  Alb  2.4<L>  /  TBili  0.4  /  DBili      /  AST  60<H>  /  ALT  141<H>  /  AlkPhos  308<H>  07-29    Creatinine Trend: 2.65 <--, 3.41 <--, 2.57 <--, 2.24 <--, 3.77 <--, 3.34 <--, 3.17 <--, 2.67 <--, 2.45 <--, 2.22 <--, 2.13 <--, 1.79 <--, 1.80 <--                                8.3    4.88  )-----------( 388      ( 29 Jul 2022 21:14 )             25.5     Urine Studies:  Urinalysis - [07-25-22 @ 18:28]      Color Yellow / Appearance Slightly Turbid / SG 1.020 / pH 5.5      Gluc Negative / Ketone Negative  / Bili Negative / Urobili Negative       Blood Small / Protein 100 / Leuk Est Large / Nitrite Negative      RBC 2 / WBC 40 / Hyaline 5 / Gran  / Sq Epi  / Non Sq Epi 0 / Bacteria Many    Urine Creatinine 129      [07-26-22 @ 04:17]  Urine Sodium 20      [07-26-22 @ 04:17]  Urine Osmolality 387      [07-26-22 @ 04:17]     Iron 17, TIBC 171, %sat 10      [05-02-22 @ 13:03]  Ferritin 6336      [07-27-22 @ 13:00]  PTH -- (Ca 9.2)      [02-05-22 @ 10:13]   294  HbA1c 6.0      [02-21-20 @ 08:53]  TSH 4.69      [07-06-22 @ 18:36]  Lipid: chol --, , HDL --, LDL --      [07-27-22 @ 13:00]    HBsAg Nonreact      [07-25-22 @ 11:51]  HCV 0.18, Nonreact      [07-25-22 @ 11:51]      RADIOLOGY & ADDITIONAL STUDIES:

## 2022-07-30 NOTE — PROGRESS NOTE ADULT - ATTENDING COMMENTS
Am SICU rounds.   67 yo m, s/p DDKT, DGF, now ESBL UTI.  - Continue phenytoin. Call neurology regarding adjustments.  - RA sat >90.  - Renal biopsy yesterday, IR no obstruction.  - HD yesterday with 1U PRBC. Hgb 8.3  - Off pressors. Resumed home meds.  - Miranda anuric.  - Denice PO. PPI ppx.  - Blood cultures sent ON.   - CXR no acute changes.  - Continue ertapenem. ID following.  - Pending biopsy results.  - DVT SCDs. Resume chemical.  - IS/ID per protocol.

## 2022-07-30 NOTE — PROGRESS NOTE ADULT - TIME BILLING
Kidney Transplant recipient with ANA  Fever under evaluation, Bacteriuria  Anemia  Patient seen, examined and reviewed available clinical and lab data including history,  progress notes and consult notes.  Reviewed immunosuppression and allograft function including urine out put, creatinine trend, urine studies and  imaging.  Reviewed medication regimen for comorbidities   Suggestions:  Continue on minimized immunosuppression, on Steroids  Antimicrobial regimen noted   F/u allograft biopsy  Dialysis with PRBC today with fluid removal as tolerated  Check IgG level and IVIg for low level  I have seen the patient and reviewed dialysis prescription  . Dialysis access is functioning well. Patient is tolerating dialysis well with no acute symptoms or distress. Dialysis prescription has been adjusted for optimized control of volume status, uremia and electrolytes. Management of additional metabolic abnormalities/anemia will continue to be addressed on follow up.  Fluid removal limited by hemodynamic status; d/w dialysis team  I was present during and reviewed clinical and lab data as well as assessment and plan as documented  . Please contact if any additional questions with any change in clinical condition or on availability of any additional information or reports.

## 2022-07-30 NOTE — PROGRESS NOTE ADULT - NUTRITIONAL ASSESSMENT
This patient has been assessed with a concern for Malnutrition and has been determined to have a diagnosis/diagnoses of Severe protein-calorie malnutrition.    This patient is being managed with:   Diet Soft and Bite Sized-  Consistent Carbohydrate {Evening Snack} (CSTCHOSN)  For patients receiving Renal Replacement - No Protein Restr No Conc K No Conc Phos Low Sodium (RENAL)  Supplement Feeding Modality:  Oral  Nepro Cans or Servings Per Day:  3       Frequency:  Three Times a day  Entered: Jul 29 2022  7:00PM

## 2022-07-30 NOTE — PROGRESS NOTE ADULT - SUBJECTIVE AND OBJECTIVE BOX
--------------------------------------------------------------------------------  Chief Complaint: No new symptoms    24 hour events/subjective:    elevated blood pressure, LE edema, noted episode of temp elevation    PAST HISTORY  --------------------------------------------------------------------------------  No significant changes to PMH, PSH, FHx, SHx, unless otherwise noted    ALLERGIES & MEDICATIONS  --------------------------------------------------------------------------------  Allergies    No Known Allergies    Intolerances      Standing Inpatient Medications  carvedilol 25 milliGRAM(s) Oral every 12 hours  chlorhexidine 2% Cloths 1 Application(s) Topical <User Schedule>  doxazosin 4 milliGRAM(s) Oral <User Schedule>  epoetin cortney-epbx (RETACRIT) Injectable 43242 Unit(s) SubCutaneous every 7 days  ertapenem  IVPB 500 milliGRAM(s) IV Intermittent every 24 hours  finasteride 5 milliGRAM(s) Oral daily  fluconAZOLE IVPB 200 milliGRAM(s) IV Intermittent every 24 hours  insulin glargine Injectable (LANTUS) 5 Unit(s) SubCutaneous at bedtime  insulin lispro (ADMELOG) corrective regimen sliding scale   SubCutaneous three times a day with meals  insulin lispro (ADMELOG) corrective regimen sliding scale   SubCutaneous at bedtime  insulin lispro Injectable (ADMELOG) 4 Unit(s) SubCutaneous three times a day before meals  levothyroxine 50 MICROGram(s) Oral daily  melatonin 6 milliGRAM(s) Oral at bedtime  NIFEdipine XL 60 milliGRAM(s) Oral daily  pantoprazole    Tablet 40 milliGRAM(s) Oral daily  phenytoin   Capsule 100 milliGRAM(s) Oral every 8 hours  polyethylene glycol 3350 17 Gram(s) Oral daily  predniSONE   Tablet 5 milliGRAM(s) Oral daily  senna 2 Tablet(s) Oral at bedtime  sodium bicarbonate 1300 milliGRAM(s) Oral <User Schedule>    PRN Inpatient Medications  acetaminophen     Tablet .. 1000 milliGRAM(s) Oral every 6 hours PRN      REVIEW OF SYSTEMS  --------------------------------------------------------------------------------  No pain/head ache/vomiting    All other systems were reviewed and are negative, except as noted.    VITALS/PHYSICAL EXAM  --------------------------------------------------------------------------------  T(C): 37.4 (07-30-22 @ 07:00), Max: 38.3 (07-30-22 @ 03:00)  HR: 58 (07-30-22 @ 10:00) (45 - 58)  BP: 168/84 (07-30-22 @ 10:00) (89/53 - 168/84)  RR: 23 (07-30-22 @ 10:00) (12 - 25)  SpO2: 95% (07-30-22 @ 10:00) (93% - 100%)  Height (cm): 177.8 (07-29-22 @ 14:36)  Weight (kg): 61.7 (07-29-22 @ 14:36)  BMI (kg/m2): 19.5 (07-29-22 @ 14:36)  BSA (m2): 1.77 (07-29-22 @ 14:36)      07-29-22 @ 07:01  -  07-30-22 @ 07:00  --------------------------------------------------------  IN: 813.9 mL / OUT: 48 mL / NET: 765.9 mL    07-30-22 @ 07:01  -  07-30-22 @ 12:34  --------------------------------------------------------  IN: 340 mL / OUT: 15 mL / NET: 325 mL      Physical Exam:  	Gen: NAD   	HEENT: no acute signs  	Pulm: CTA B/L  	CV: RRR, S1S2; no rub  	Abd: +BS, soft, nontender/nondistended                      Transplant: No tenderness, swelling  	UE:  edema noted bilateral; no asterixis  	LE:  edema+  	Neuro: No acute focal deficits,   	Psych: Normal affect and mood         LABS/STUDIES  --------------------------------------------------------------------------------              8.3    4.88  >-----------<  388      [07-29-22 @ 21:14]              25.5     137  |  98  |  51  ----------------------------<  149      [07-29-22 @ 21:14]  3.9   |  26  |  2.65        Ca     7.9     [07-29-22 @ 21:14]      Mg     1.9     [07-29-22 @ 21:14]      Phos  3.2     [07-29-22 @ 21:14]    TPro  5.8  /  Alb  2.4  /  TBili  0.4  /  DBili  x   /  AST  60  /  ALT  141  /  AlkPhos  308  [07-29-22 @ 21:14]    PT/INR: PT 16.0 , INR 1.37       [07-29-22 @ 21:14]  PTT: 31.6       [07-29-22 @ 21:14]      Creatinine Trend:  SCr 2.65 [07-29 @ 21:14]  SCr 3.41 [07-28 @ 23:12]  SCr 2.57 [07-28 @ 00:55]  SCr 2.24 [07-27 @ 22:17]  SCr 3.77 [07-27 @ 13:00]         Urinalysis - [07-25-22 @ 18:28]      Color Yellow / Appearance Slightly Turbid / SG 1.020 / pH 5.5      Gluc Negative / Ketone Negative  / Bili Negative / Urobili Negative       Blood Small / Protein 100 / Leuk Est Large / Nitrite Negative      RBC 2 / WBC 40 / Hyaline 5 / Gran  / Sq Epi  / Non Sq Epi 0 / Bacteria Many    Urine Creatinine 129      [07-26-22 @ 04:17]  Urine Sodium 20      [07-26-22 @ 04:17]  Urine Osmolality 387      [07-26-22 @ 04:17]    Iron 17, TIBC 171, %sat 10      [05-02-22 @ 13:03]  Ferritin 6336      [07-27-22 @ 13:00]  PTH -- (Ca 9.2)      [02-05-22 @ 10:13]   294  HbA1c 6.0      [02-21-20 @ 08:53]  TSH 4.69      [07-06-22 @ 18:36]  Lipid: chol --, , HDL --, LDL --      [07-27-22 @ 13:00]    HBsAg Nonreact      [07-25-22 @ 11:51]  HCV 0.18, Nonreact      [07-25-22 @ 11:51]

## 2022-07-30 NOTE — PROGRESS NOTE ADULT - ASSESSMENT
66M with h/o DM type II, HTN, CAD s/p CABG in 2020, Afib on Eliquis, CVA in 2019 due to Afib, Seizure d/o (last episode was on 4/8/22), h/o GI bleed in 2/2022 EGD with duodenal ulcer and ESRD on HD s/p R DDRT from DCD donor on 4/21/22 complicated by DGF requiring HD until 4/29/22 now with good graft function who presented with status epilepticus in setting of UTI/antibiotics and sub-therapeutic valproic acid level. Transferred to SICU on 7/25 with signs of sepsis. Biopsy from 7/29 demonstrating grade 1a rejection.         Acute Transaminitis - now improving   - Hepatology following   - hepatotoxic medications held  - RUQ abd- Abdominal doppler - no PVT   - CT a/p non contrast- small perinephric collection   - Labs: Hepatitis panel, BK PCR, EBV PCR, CMV with PCR   - Infectious w/u: bld cxs with NGTD; urine cx with Kleb pneumo   - ID following: on Erta started 7/28 now off miguel   - On  empiric fluc    - Check IgG level -> may benefit from IVIG given ongoing fever despite broad-spectrum antibiotic coverage     R DCD DDRT 4/21/22 now with ANA  - S/P removal of ureteral stent on 7/12  - s/p HD 7/27, 7/29  - s/p IR Biopsy (7/29) - grade 1 a rejection -> Solumedrol 500 BID (7/30), 250 BID (7/31), restart cellcept   - Continue Doxazosin/Proscar for BPH  - OOB with RN/PT  - Leukopenia: valcyte  - F/u results of DSA     Immunosuppression:   - Belatacept: 6/23, 7/4, 7/8. Re-loaded 7/8,  as there was a gap between doses 2/2 seizures. 7/18,  next dose 8/1  - continue Pred 5  - PPx: Valcyte/ bactrim on hold.    Seizure Disorder:  - MRI head 6/27 with less concerns for PRES, likely ischemic changes  - Remains seizure free  - Antiepileptic regimen per Neurology, on Phenytoin 100mg TID, no further adjustment required  - Keep Mag > 2    DM  - on Lantus/Lispro, Endocrine following     AFib  RIJ DVT   - Eliquis with ~40% less efficacy while on fosphenytoin.    - Lovenox held for ANA and transaminitis   - rate controlled    HTN:  - Nifedipine/Cardura/Coreg.   - dced Hydralazine (in setting of transaminitis )

## 2022-07-30 NOTE — PROGRESS NOTE ADULT - ASSESSMENT
66 yr old Male with PMHx ESRD s/p permacath removal 5/10/22 s/p Rt DDRT 4/21/22,  CVA (2019), Afib on apixaban, seizure activity, CAD s/p stents, CABG (2020), HTN, HLD, DM on insulin, gastric/duodenal ulcer, recent hospitalization 4/28/22 -5/10/22 with weakness/anemia found to have perinephric hematoma requiring evacuation and repair of bleeding arterial anastomosis. Curently being treated for UTI with Levaquin Today after developing multiple sz episodes refractory to 5 mg versed IM (EMS) and 2 mg ativan IV in E.D. concerning for status epilepticus requiring intubation for hypoxic respiratory  failure. MICU Consult was called for hypoxic respiratory failure secondary to status epilepticus.  Nephrology consulted for renal failure.     A/P  DDRT on 04/21/22  Course complicated by DGF, was on HD until 4/29/22. Readmitted in May for anemia in the setting of large perinephric hematoma s/p evacuation and repair of arterial anastomosis on 5/02/22. Intra operative biopsy showed no rejection.  ANA was initially sec to  hemodynamic change   stent removal 07/12/22  scr trending up now fluctuating.   Febrile, etiology?, being w/u by ID   No hydronephrosis noted on renal US. UA done on 7/25/22 showed LE and pyuria.   allograft US 07/28/22 shows Interval increase in the resistive indices and peak systolic velocities   compared to prior study performed yesterday. Short-term imaging follow-up   if clinically indicated.  Follow BK PCR and adenovirus.   Follow urine and blood cultures.  follow fungal cultures.   Pt with worsening acidosis and oliguric   s/p HD 07/27/22; plan for HD today -  HD per transplant team   Immunosuppression per transplant team  transplant team on board   allograft US, DSA negative   monitor BMP, U/O     HYponatremia   avoid hypotonic solution   optimize glucose   monitor BMP closely     HTN   optimal   manage by transplant team    monitor BP closely

## 2022-07-30 NOTE — PROGRESS NOTE ADULT - ASSESSMENT
65 y/o male w/ a PMHx of CAD s/p CABG, AF on Eliquis c/b CVA c/b seizures, HTN, HLD, DM type II, hypothyroidism, GI bleed due to a duodenal ulcer, and ESRD s/p DDRT c/b DGF requiring HD & large perinephric hematoma s/p evacuation w/ revision of arterial anastomosis who presented in status epilepticus requiring intubation for airway protection with a hospital course c/b large right pleural effusion s/p thoracentesis c/b hemothorax while being anticoagulated & requiring intubation for airway protection s/p chest tube placement w/ CT demonstrating retained hemothorax s/p right VATS, status epilepticus again causing AMS requiring intubation for airway protection again, delirium, dysphagia requiring NGT placement for enteral access, hyperkalemia, and poor glycemic control. Pt transferred to floor 7/10 and readmitted to SICU 7/25 for leukopenia, transaminitis, and ANA.     Neuro:  - Phenytoin 100 q8 for seizure ppx  - f/u Phenytoin levels, levels low due to dialysis  - Multimodal pain control w/Tylenol  - Melatonin + protected sleep    Resp: no acute issues  - Out of bed to chair, ambulate as tolerated, and incentive spirometry to prevent atelectasis      CVS:   - Nifedipine 60 mg qd  - Coreg 25 mg q12    GI:   - NPO for possible procedure today  - Transaminitis continues to improve, continue holding hydralazine, lipitor, bactrim  - Bowel regimen with Miralax, holding senna due to multiple bowel movements overnight  - Protonix for stress ulcer prophylaxis while intubated/ npo    Renal:  - Monitor I&Os and electrolytes  - replete electrolytes as needed   - Doxazosin + Finasteride  - IV locked    Heme:  - Monitor CBC and coags  - Receiving Epogen  - SCDs  - holding VTE ppx for now     ID:   - Monitor for clinical evidence of active infection  - Monitor WBC, temperature, and procalcitonin  - Erbapenam until 8/3 + Fluconazole  - Prednisone taper    Endo: hx of DM, Hypothyroidism  - Monitor glucose of bmp  - Lantus 5, premeal 4, and ISS  - Home Synthroid    GOC:     Lines/Tubes:

## 2022-07-30 NOTE — PROGRESS NOTE ADULT - SUBJECTIVE AND OBJECTIVE BOX
24 HOUR EVENTS:      -Febrile, blood cx and CXR ordered  -Off Levo  -Renal biopsy completed  -Diet advanced  -Ertapenem given post HD    NEURO  RASS (if intubated): 		CAM ICU (if concern for delirium):  Exam:   Meds: acetaminophen     Tablet .. 1000 milliGRAM(s) Oral every 6 hours PRN Temp greater or equal to 38C (100.4F), Mild Pain (1 - 3)  melatonin 6 milliGRAM(s) Oral at bedtime  phenytoin   Capsule 100 milliGRAM(s) Oral every 8 hours      RESPIRATORY  RR: 22 (07-30-22 @ 04:00) (12 - 25)  SpO2: 94% (07-30-22 @ 04:00) (93% - 100%)  Wt(kg): --  Exam:  Mechanical Ventilation:     Meds:     CARDIOVASCULAR  HR: 58 (07-30-22 @ 04:00) (45 - 58)  BP: 162/67 (07-30-22 @ 04:00) (89/53 - 189/81)  BP(mean): 97 (07-30-22 @ 04:00) (67 - 123)  ABP: --  ABP(mean): --  Wt(kg): --  CVP(cm H2O): --      Exam:   Cardiac Rhythm:   Perfusion     [ ]Adequate   [ ]Inadequate  Mentation   [ ]Normal       [ ]Reduced  Extremities  [ ]Warm         [ ]Cool  Volume Status [ ]Hypervolemic [ ]Euvolemic [ ]Hypovolemic  Meds: carvedilol 25 milliGRAM(s) Oral every 12 hours  doxazosin 4 milliGRAM(s) Oral <User Schedule>  NIFEdipine XL 60 milliGRAM(s) Oral daily      GI/NUTRITION  Exam:   Diet:   Meds: pantoprazole    Tablet 40 milliGRAM(s) Oral daily  polyethylene glycol 3350 17 Gram(s) Oral daily  senna 2 Tablet(s) Oral at bedtime      GENITOURINARY  I&O's Detail    07-28 @ 07:01  -  07-29 @ 07:00  --------------------------------------------------------  IN:    IV PiggyBack: 300 mL    Oral Fluid: 1600 mL  Total IN: 1900 mL    OUT:    Indwelling Catheter - Urethral (mL): 97 mL  Total OUT: 97 mL    Total NET: 1803 mL      07-29 @ 07:01  -  07-30 @ 05:31  --------------------------------------------------------  IN:    IV PiggyBack: 200 mL    IV PiggyBack: 100 mL    Norepinephrine: 13.9 mL    Oral Fluid: 100 mL    PRBCs (Packed Red Blood Cells): 300 mL  Total IN: 713.9 mL    OUT:    Indwelling Catheter - Urethral (mL): 33 mL    Other (mL): 0 mL  Total OUT: 33 mL    Total NET: 680.9 mL        Weight (kg): 61.7 (07-29 @ 14:36)  07-29    137  |  98  |  51<H>  ----------------------------<  149<H>  3.9   |  26  |  2.65<H>    Ca    7.9<L>      29 Jul 2022 21:14  Phos  3.2     07-29  Mg     1.9     07-29    TPro  5.8<L>  /  Alb  2.4<L>  /  TBili  0.4  /  DBili  x   /  AST  60<H>  /  ALT  141<H>  /  AlkPhos  308<H>  07-29    Meds: sodium bicarbonate 1300 milliGRAM(s) Oral <User Schedule>      HEMATOLOGIC  Meds:                         8.3    4.88  )-----------( 388      ( 29 Jul 2022 21:14 )             25.5     PT/INR - ( 29 Jul 2022 21:14 )   PT: 16.0 sec;   INR: 1.37 ratio         PTT - ( 29 Jul 2022 21:14 )  PTT:31.6 sec    INFECTIOUS DISEASES  T(C): 38.3 (07-30-22 @ 03:00), Max: 38.3 (07-30-22 @ 03:00)  Wt(kg): --  WBC Count: 4.88 K/uL (07-29 @ 21:14)    Recent Cultures:  Specimen Source: .Blood Blood, 07-27 @ 04:34; Results   Testing in progress; Gram Stain: --; Organism: --  Specimen Source: Catheterized Catheterized, 07-25 @ 18:27; Results   50,000 - 99,000 CFU/mL Klebsiella pneumoniae ESBL; Gram Stain: --; Organism: Klebsiella pneumoniae ESBL  Specimen Source: .Blood Blood, 07-25 @ 14:28; Results   No growth to date.; Gram Stain: --; Organism: --  Specimen Source: .Blood Blood, 07-25 @ 11:59; Results   No growth to date.; Gram Stain: --; Organism: --    Meds: epoetin cortney-epbx (RETACRIT) Injectable 14707 Unit(s) SubCutaneous every 7 days  ertapenem  IVPB 500 milliGRAM(s) IV Intermittent every 24 hours  fluconAZOLE IVPB 200 milliGRAM(s) IV Intermittent every 24 hours      ENDOCRINE  Capillary Blood Glucose    Meds: finasteride 5 milliGRAM(s) Oral daily  insulin glargine Injectable (LANTUS) 5 Unit(s) SubCutaneous at bedtime  insulin lispro (ADMELOG) corrective regimen sliding scale   SubCutaneous three times a day with meals  insulin lispro (ADMELOG) corrective regimen sliding scale   SubCutaneous at bedtime  insulin lispro Injectable (ADMELOG) 4 Unit(s) SubCutaneous three times a day before meals  levothyroxine 50 MICROGram(s) Oral daily  predniSONE   Tablet 5 milliGRAM(s) Oral daily      ACCESS DEVICES:  [ ] Peripheral IV  [ ] Central Venous Line		[ ] R	[ ] L	[ ] IJ	[ ] Fem	[ ] SC	Placed:   [ ] Arterial Line			[ ] R	[ ] L	[ ] Fem	[ ] Rad	[ ] Ax	Placed:   [ ] PICC:					[ ] Mediport  [ ] Urinary Catheter, Date Placed:   [ ] Necessity of urinary, arterial, and venous catheters discussed    OTHER MEDICATIONS:  chlorhexidine 2% Cloths 1 Application(s) Topical <User Schedule>      IMAGING:

## 2022-07-31 NOTE — PROGRESS NOTE ADULT - TIME BILLING
Kidney Transplant recipient with ANA, oliguria  ACR 1A on prelim renal allograft biopsy report on Steroids  Anemia  Patient seen, examined and reviewed available clinical and lab data including history,  progress notes and consult notes.  Reviewed immunosuppression and allograft function including urine out put, creatinine trend, urine studies and  imaging.  Reviewed medication regimen for comorbidities   Suggestions:  Agree with starting low dose Everolimus  Antirejection regimen on with Solumedrol  Check IgG level and IVIg for low level  Noted DSA-None  Monitor for allograft function recovery. If remains oliguric monitor daily for dialysis need  D/w transplant team  I was present during and reviewed clinical and lab data as well as assessment and plan as documented  . Please contact if any additional questions with any change in clinical condition or on availability of any additional information or reports. Kidney Transplant recipient with ANA, oliguria  ACR 1A on prelim renal allograft biopsy report on Steroids  Anemia  Hyperglycemia, hypertension , likely related to Solumedrol  Patient seen, examined and reviewed available clinical and lab data including history,  progress notes and consult notes.  Reviewed immunosuppression and allograft function including urine out put, creatinine trend, urine studies and  imaging.  Reviewed medication regimen for comorbidities   Suggestions:  Agree with starting low dose Everolimus  Antirejection regimen on with Solumedrol  Check IgG level and IVIg for low level  Noted DSA-None  May increase NIfedipine to twice daily  Monitor for allograft function recovery. If remains oliguric monitor daily for dialysis need  D/w transplant team  I was present during and reviewed clinical and lab data as well as assessment and plan as documented  . Please contact if any additional questions with any change in clinical condition or on availability of any additional information or reports.

## 2022-07-31 NOTE — PROGRESS NOTE ADULT - PROBLEM SELECTOR PLAN 3
c/w LT4 50 mcg daily   Please make sure LT4 is given on an empty stomach at least one hour apart from other meds and food to ensure med absorption. DO NOT GIVE WITH VITAMINS/ANTACIDS  - monitor TFTs outpatient in 4 weeks after dc with Dr. Lincoln.    discussed w/RN  Can be reached via TEAMS/pager: [phone]   office:  [phone] (M-F 9a-5pm)               [phone] (nights/weekends)   [email or web address] password [email or web address]

## 2022-07-31 NOTE — PROGRESS NOTE ADULT - PROBLEM SELECTOR PLAN 1
-Test BG AC/HS   -C/w Lantus 5 units at bedtime.    -C/w Admelog 4 units tid with meals ensure pt tolerating meal prior to administration to prevent hypoglycemia, If prandial BG ac meals consistently >180s increase to 5 units with meals  -Change Admelog to moderate correction scales AC and mod HS scale while on high dose steroid. Can change back to low scales 8/1 if steroid effect improved.   -Please inform endo team of any changes on steroid therapy or PO/TF status  Discharge  plan to rehab, c/w basal bolus insulin final doses as above if FBG at goal  Titrate further at rehab as necessary .   Outpatient f/u with Endocrinologist Dr. Lincoln. [address]. Phone  Needs apt at time of discharge  -Needs optho/renal/cardiac f/u as out pt  -Make sure pt has insulin and DM supplies at time of discharge.

## 2022-07-31 NOTE — PROGRESS NOTE ADULT - SUBJECTIVE AND OBJECTIVE BOX
24 HOUR EVENTS:  -Febrile, Tmax 38.6  -S/p HD w 1.5 L removed   -1 U PRBC transfused   -Dosed with 500mg Solumedrol d/t concern for acute graft rejection       SUBJECTIVE/ROS:  [ ] A ten-point review of systems was otherwise negative except as noted.  [ ] Due to altered mental status/intubation, subjective information were not able to be obtained from the patient. History was obtained, to the extent possible, from review of the chart and collateral sources of information.      NEURO  Exam: awake, alert, oriented  Meds: acetaminophen     Tablet .. 1000 milliGRAM(s) Oral every 6 hours PRN Temp greater or equal to 38C (100.4F), Mild Pain (1 - 3)  melatonin 6 milliGRAM(s) Oral at bedtime  phenytoin   Capsule 100 milliGRAM(s) Oral every 8 hours    [x] Adequacy of sedation and pain control has been assessed and adjusted      RESPIRATORY  RR: 15 (07-31-22 @ 01:00) (11 - 29)  SpO2: 92% (07-31-22 @ 01:00) (92% - 99%)  Wt(kg): --  Exam: unlabored, clear to auscultation bilaterally  Mechanical Ventilation:     [N/A] Extubation Readiness Assessed  Meds:       CARDIOVASCULAR  HR: 49 (07-31-22 @ 01:00) (49 - 65)  BP: 131/64 (07-31-22 @ 01:00) (110/52 - 186/78)  BP(mean): 92 (07-31-22 @ 01:00) (75 - 118)  ABP: --  ABP(mean): --  Wt(kg): --  CVP(cm H2O): --      Exam: regular rate and rhythm  Cardiac Rhythm: sinus  Perfusion     [x]Adequate   [ ]Inadequate  Mentation   [x]Normal       [ ]Reduced  Extremities  [x]Warm         [ ]Cool  Volume Status [ ]Hypervolemic [x]Euvolemic [ ]Hypovolemic  Meds: carvedilol 25 milliGRAM(s) Oral every 12 hours  doxazosin 4 milliGRAM(s) Oral <User Schedule>  NIFEdipine XL 60 milliGRAM(s) Oral daily        GI/NUTRITION  Exam: soft, nontender, nondistended, incision C/D/I  Diet: soft and bite sized   Meds: pantoprazole    Tablet 40 milliGRAM(s) Oral daily  polyethylene glycol 3350 17 Gram(s) Oral daily  senna 2 Tablet(s) Oral at bedtime      GENITOURINARY  I&O's Detail    07-29 @ 07:01 - 07-30 @ 07:00  --------------------------------------------------------  IN:    IV PiggyBack: 200 mL    IV PiggyBack: 100 mL    Norepinephrine: 13.9 mL    Oral Fluid: 200 mL    PRBCs (Packed Red Blood Cells): 300 mL  Total IN: 813.9 mL    OUT:    Indwelling Catheter - Urethral (mL): 48 mL    Other (mL): 0 mL  Total OUT: 48 mL    Total NET: 765.9 mL      07-30 @ 07:01 - 07-31 @ 02:53  --------------------------------------------------------  IN:    IV PiggyBack: 100 mL    Oral Fluid: 480 mL  Total IN: 580 mL    OUT:    Indwelling Catheter - Urethral (mL): 50 mL    Other (mL): 1500 mL  Total OUT: 1550 mL    Total NET: -970 mL          07-30    135  |  97  |  42<H>  ----------------------------<  313<H>  4.3   |  25  |  2.52<H>    Ca    8.4      30 Jul 2022 22:45  Phos  2.9     07-30  Mg     2.2     07-30    TPro  6.1  /  Alb  2.4<L>  /  TBili  0.4  /  DBili  x   /  AST  67<H>  /  ALT  103<H>  /  AlkPhos  449<H>  07-30    [ ] Miranda catheter, indication: N/A  Meds: sodium bicarbonate 1300 milliGRAM(s) Oral <User Schedule>        HEMATOLOGIC  Meds:   [x] VTE Prophylaxis                        10.0   7.43  )-----------( 398      ( 30 Jul 2022 22:45 )             30.3     PT/INR - ( 30 Jul 2022 22:45 )   PT: 15.9 sec;   INR: 1.37 ratio         PTT - ( 30 Jul 2022 22:45 )  PTT:32.2 sec  Transfusion     [ ] PRBC   [ ] Platelets   [ ] FFP   [ ] Cryoprecipitate      INFECTIOUS DISEASES  WBC Count: 7.43 K/uL (07-30 @ 22:45)    RECENT CULTURES:  Specimen Source: .Blood Blood  Date/Time: 07-27 @ 04:34  Culture Results:   Testing in progress  Gram Stain: --  Organism: --    Meds: epoetin cortney-epbx (RETACRIT) Injectable 97147 Unit(s) SubCutaneous every 7 days  ertapenem  IVPB 500 milliGRAM(s) IV Intermittent every 24 hours  fluconAZOLE IVPB 200 milliGRAM(s) IV Intermittent every 24 hours  mycophenolate mofetil 1000 milliGRAM(s) Oral every 12 hours        ENDOCRINE  CAPILLARY BLOOD GLUCOSE      POCT Blood Glucose.: 296 mg/dL (30 Jul 2022 21:39)  POCT Blood Glucose.: 230 mg/dL (30 Jul 2022 17:48)  POCT Blood Glucose.: 125 mg/dL (30 Jul 2022 12:35)  POCT Blood Glucose.: 101 mg/dL (30 Jul 2022 08:21)    Meds: finasteride 5 milliGRAM(s) Oral daily  insulin glargine Injectable (LANTUS) 5 Unit(s) SubCutaneous at bedtime  insulin lispro (ADMELOG) corrective regimen sliding scale   SubCutaneous three times a day with meals  insulin lispro (ADMELOG) corrective regimen sliding scale   SubCutaneous at bedtime  insulin lispro Injectable (ADMELOG) 4 Unit(s) SubCutaneous three times a day before meals  levothyroxine 50 MICROGram(s) Oral daily  methylPREDNISolone sodium succinate IVPB 250 milliGRAM(s) IV Intermittent once        ACCESS DEVICES:  [ ] Peripheral IV  [ ] Central Venous Line	[ ] R	[ ] L	[ ] IJ	[ ] Fem	[ ] SC	Placed:   [ ] Arterial Line		[ ] R	[ ] L	[ ] Fem	[ ] Rad	[ ] Ax	Placed:   [ ] PICC:					[ ] Mediport  [ ] Urinary Catheter, Date Placed:   [x] Necessity of urinary, arterial, and venous catheters discussed    OTHER MEDICATIONS:  chlorhexidine 2% Cloths 1 Application(s) Topical <User Schedule>      CODE STATUS:      IMAGING:

## 2022-07-31 NOTE — PROGRESS NOTE ADULT - ASSESSMENT
66 yr old Male with PMHx ESRD s/p permacath removal 5/10/22 s/p Rt DDRT 4/21/22,  CVA (2019), Afib on apixaban, seizure activity, CAD s/p stents, CABG (2020), HTN, HLD, DM on insulin, gastric/duodenal ulcer, recent hospitalization 4/28/22 -5/10/22 with weakness/anemia found to have perinephric hematoma requiring evacuation and repair of bleeding arterial anastomosis. Curently being treated for UTI with Levaquin Today after developing multiple sz episodes refractory to 5 mg versed IM (EMS) and 2 mg ativan IV in E.D. concerning for status epilepticus requiring intubation for hypoxic respiratory  failure. MICU Consult was called for hypoxic respiratory failure secondary to status epilepticus.  Nephrology consulted for renal failure.     A/P  DDRT on 04/21/22  Course complicated by DGF, was on HD until 4/29/22. Readmitted in May for anemia in the setting of large perinephric hematoma s/p evacuation and repair of arterial anastomosis on 5/02/22. Intra operative biopsy showed no rejection.  ANA was initially sec to  hemodynamic change   stent removal 07/12/22  scr trending up now fluctuating.   Febrile, etiology?, being w/u by ID   No hydronephrosis noted on renal US. UA done on 7/25/22 showed LE and pyuria.   allograft US 07/28/22 shows Interval increase in the resistive indices and peak systolic velocities   compared to prior study performed yesterday. Short-term imaging follow-up   if clinically indicated.  Follow BK PCR and adenovirus.   Follow urine and blood cultures.  follow fungal cultures.   Pt with worsening acidosis and oliguric   s/p HD 07/27/22; plan for HD today -  HD per transplant team   Immunosuppression per transplant team  transplant team on board   allograft US, DSA negative   monitor BMP, U/O     HTN   suboptimal   UF as tolerated  New bradycardia (HR 40's->50's).   manage by transplant team    monitor BP closely

## 2022-07-31 NOTE — PROGRESS NOTE ADULT - SUBJECTIVE AND OBJECTIVE BOX
[facility]   MD NINA MASON MD KRISTINE SOLTANPOUR, WALDO PALMA    TEL:  OFFICE: [phone]  From 5pm-7am Answering Service [phone]    -- RENAL FOLLOW UP NOTE ---Date of Service 07-31-22 @ 16:06    Patient is a 66y old  Male who presents with a chief complaint of Status Epilepticus (31 Jul 2022 11:53)      Patient seen and examined at bedside. Febrile again cultures were sent by Transplant and SICU team.     VITALS:  T(F): 97.5 (07-31-22 @ 15:00), Max: 99.5 (07-30-22 @ 17:00)  HR: 55 (07-31-22 @ 15:00)  BP: 167/76 (07-31-22 @ 15:00)  RR: 18 (07-31-22 @ 15:00)  SpO2: 97% (07-31-22 @ 15:00)  Wt(kg): --    07-30 @ 07:01  -  07-31 @ 07:00  --------------------------------------------------------  IN: 580 mL / OUT: 1560 mL / NET: -980 mL    07-31 @ 07:01  -  07-31 @ 16:06  --------------------------------------------------------  IN: 624 mL / OUT: 20 mL / NET: 604 mL          PHYSICAL EXAM:  General: NAD  Neck: No JVD  Respiratory: CTAB, no wheezes, rales or rhonchi  Cardiovascular: S1, S2, RRR  Gastrointestinal: BS+, soft, NT/ND  Extremities: No peripheral edema    Hospital Medications:   MEDICATIONS  (STANDING):  carvedilol 25 milliGRAM(s) Oral every 12 hours  chlorhexidine 2% Cloths 1 Application(s) Topical <User Schedule>  doxazosin 4 milliGRAM(s) Oral <User Schedule>  epoetin cortney-epbx (RETACRIT) Injectable 40903 Unit(s) SubCutaneous every 7 days  ertapenem  IVPB 500 milliGRAM(s) IV Intermittent every 24 hours  everolimus (ZORTRESS) 0.5 milliGRAM(s) Oral every 12 hours  finasteride 5 milliGRAM(s) Oral daily  fluconAZOLE IVPB 200 milliGRAM(s) IV Intermittent every 24 hours  heparin   Injectable 5000 Unit(s) SubCutaneous every 8 hours  insulin glargine Injectable (LANTUS) 5 Unit(s) SubCutaneous at bedtime  insulin lispro (ADMELOG) corrective regimen sliding scale   SubCutaneous three times a day before meals  insulin lispro (ADMELOG) corrective regimen sliding scale   SubCutaneous at bedtime  insulin lispro Injectable (ADMELOG) 5 Unit(s) SubCutaneous three times a day before meals  levothyroxine 50 MICROGram(s) Oral daily  melatonin 6 milliGRAM(s) Oral at bedtime  mycophenolate mofetil 1000 milliGRAM(s) Oral every 12 hours  NIFEdipine XL 60 milliGRAM(s) Oral every 12 hours  pantoprazole    Tablet 40 milliGRAM(s) Oral daily  phenytoin   Capsule 100 milliGRAM(s) Oral every 8 hours  polyethylene glycol 3350 17 Gram(s) Oral daily  senna 2 Tablet(s) Oral at bedtime  sodium bicarbonate 1300 milliGRAM(s) Oral <User Schedule>      LABS:  07-30    135  |  97  |  42<H>  ----------------------------<  313<H>  4.3   |  25  |  2.52<H>    Ca    8.4      30 Jul 2022 22:45  Phos  2.9     07-30  Mg     2.2     07-30    TPro  6.1  /  Alb  2.4<L>  /  TBili  0.4  /  DBili      /  AST  67<H>  /  ALT  103<H>  /  AlkPhos  449<H>  07-30    Creatinine Trend: 2.52 <--, 2.65 <--, 3.41 <--, 2.57 <--, 2.24 <--, 3.77 <--, 3.34 <--, 3.17 <--, 2.67 <--, 2.45 <--, 2.22 <--, 2.13 <--, 1.79 <--    Albumin, Serum: 2.4 g/dL (07-30 @ 22:45)  Phosphorus Level, Serum: 2.9 mg/dL (07-30 @ 22:45)                              10.0   7.43  )-----------( 398      ( 30 Jul 2022 22:45 )             30.3     Urine Studies:  Urinalysis - [07-25-22 @ 18:28]      Color Yellow / Appearance Slightly Turbid / SG 1.020 / pH 5.5      Gluc Negative / Ketone Negative  / Bili Negative / Urobili Negative       Blood Small / Protein 100 / Leuk Est Large / Nitrite Negative      RBC 2 / WBC 40 / Hyaline 5 / Gran  / Sq Epi  / Non Sq Epi 0 / Bacteria Many    Urine Creatinine 129      [07-26-22 @ 04:17]  Urine Sodium 20      [07-26-22 @ 04:17]  Urine Osmolality 387      [07-26-22 @ 04:17]    Iron 17, TIBC 171, %sat 10      [05-02-22 @ 13:03]  Ferritin 6336      [07-27-22 @ 13:00]  PTH -- (Ca 9.2)      [02-05-22 @ 10:13]   294  HbA1c 6.0      [02-21-20 @ 08:53]  TSH 4.69      [07-06-22 @ 18:36]  Lipid: chol --, , HDL --, LDL --      [07-27-22 @ 13:00]    HBsAg Nonreact      [07-25-22 @ 11:51]  HCV 0.18, Nonreact      [07-25-22 @ 11:51]      RADIOLOGY & ADDITIONAL STUDIES:

## 2022-07-31 NOTE — PROGRESS NOTE ADULT - NUTRITIONAL ASSESSMENT
Diet, Soft and Bite Sized:   Consistent Carbohydrate {Evening Snack} (CSTCHOSN)  For patients receiving Renal Replacement - No Protein Restr, No Conc K, No Conc Phos, Low Sodium (RENAL)  Supplement Feeding Modality:  Oral  Nepro Cans or Servings Per Day:  3       Frequency:  Three Times a day (07-29-22 @ 19:00) [Active]

## 2022-07-31 NOTE — PROGRESS NOTE ADULT - ASSESSMENT
66 yr old M with Type 2 DM> unknown control due to skewed A1C 6.6% secondary to chronic anemia and s/p blood tx. On basal/bolus insulin therapy PTA. DM c/b ESRD s/p DDRT on 3/21, CVA, CAD s/p CABG, Afib on apixaban, seizure activity, HTN, HLD, h/o GI bleed in 2/2022 EGD with duodenal ulcer, discharged to [facility] rehab center after prior hospitalization, now re-admitted from [facility] for seizures c/f status epilepticus in setting of UTI. endocrine consulted for steroid induced hyperglycemia, now on home dose prednisone 5mg. Prolonged hospital course w/ ICU stay, previously intubated/extubated, previous seizures. S/p ureteral stent removal 7/12/22 pt is now on and off NPO for testing to r/o rejection/ obstruction due to anuria and worsening creat. Also on antibiotic for sepsis. Received high dose steroids yesterday/today now w/BG values above goal. Tolerating POs. Expect hyperglycemia to be transient so won't make global changes to insulin regimen at this time. BG goal (100-180mg/dl).

## 2022-07-31 NOTE — PROGRESS NOTE ADULT - SUBJECTIVE AND OBJECTIVE BOX
Diabetes Follow up note:    Chief complaint: T2DM w/steroid induced hyperglycemia.     Interval Hx: s/p solumedrol 500mg yesterday x 1. BG values 200s over past 24 hours. Receiving additional 250mg of solumedrol today. Pt seen at bedside. Reports feeling weakness but tolerating POs.     Review of Systems:  General: as above.   GI: Tolerating POs. Denies N/V/D/Abd pain  CV: Denies CP/SOB  ENDO: No S&Sx of hypoglycemia    MEDS:  finasteride 5 milliGRAM(s) Oral daily  insulin glargine Injectable (LANTUS) 5 Unit(s) SubCutaneous at bedtime  insulin lispro (ADMELOG) corrective regimen sliding scale   SubCutaneous three times a day with meals  insulin lispro (ADMELOG) corrective regimen sliding scale   SubCutaneous at bedtime  insulin lispro Injectable (ADMELOG) 4 Unit(s) SubCutaneous three times a day before meals  levothyroxine 50 MICROGram(s) Oral daily  methylPREDNISolone sodium succinate IVPB 250 milliGRAM(s) IV Intermittent once    ertapenem  IVPB 500 milliGRAM(s) IV Intermittent every 24 hours  fluconAZOLE IVPB 200 milliGRAM(s) IV Intermittent every 24 hours    Allergies    No Known Allergies        PE:  General: Male lying in bed. Appears weak.   Vital Signs Last 24 Hrs  T(C): 36.3 (31 Jul 2022 11:00), Max: 38.6 (30 Jul 2022 13:00)  T(F): 97.3 (31 Jul 2022 11:00), Max: 101.5 (30 Jul 2022 13:00)  HR: 49 (31 Jul 2022 11:00) (48 - 65)  BP: 188/74 (31 Jul 2022 11:00) (110/52 - 188/74)  BP(mean): 107 (31 Jul 2022 11:00) (75 - 112)  RR: 15 (31 Jul 2022 11:00) (11 - 29)  SpO2: 98% (31 Jul 2022 11:00) (92% - 99%)    Parameters below as of 31 Jul 2022 07:00  Patient On (Oxygen Delivery Method): room air      Abd: Soft, NT,ND,   Extremities: Warm  Neuro: A&O X3    LABS:  POCT Blood Glucose.: 271 mg/dL (07-31-22 @ 11:48)  POCT Blood Glucose.: 275 mg/dL (07-31-22 @ 07:59)  POCT Blood Glucose.: 296 mg/dL (07-30-22 @ 21:39)  POCT Blood Glucose.: 230 mg/dL (07-30-22 @ 17:48)  POCT Blood Glucose.: 125 mg/dL (07-30-22 @ 12:35)  POCT Blood Glucose.: 101 mg/dL (07-30-22 @ 08:21)  POCT Blood Glucose.: 144 mg/dL (07-29-22 @ 21:03)  POCT Blood Glucose.: 132 mg/dL (07-29-22 @ 17:36)  POCT Blood Glucose.: 189 mg/dL (07-29-22 @ 12:14)  POCT Blood Glucose.: 230 mg/dL (07-29-22 @ 08:05)  POCT Blood Glucose.: 193 mg/dL (07-28-22 @ 21:39)  POCT Blood Glucose.: 192 mg/dL (07-28-22 @ 17:00)  POCT Blood Glucose.: 136 mg/dL (07-28-22 @ 12:44)                            10.0   7.43  )-----------( 398      ( 30 Jul 2022 22:45 )             30.3       07-30    135  |  97  |  42<H>  ----------------------------<  313<H>  4.3   |  25  |  2.52<H>    eGFR: 27<L>    Ca    8.4      07-30  Mg     2.2     07-30  Phos  2.9     07-30    TPro  6.1  /  Alb  2.4<L>  /  TBili  0.4  /  DBili  x   /  AST  67<H>  /  ALT  103<H>  /  AlkPhos  449<H>  07-30      Thyroid Function Tests:  07-06 @ 18:36 TSH 4.69 FreeT4 -- T3 43 Anti TPO -- Anti Thyroglobulin Ab -- TSI --      A1C with Estimated Average Glucose Result: 6.0 % (06-30-22 @ 05:49)  A1C with Estimated Average Glucose Result: 6.6 % (04-24-22 @ 17:12)  A1C with Estimated Average Glucose Result: 7.3 % (02-04-22 @ 13:42)  A1C with Estimated Average Glucose Result: 7.2 % (02-04-22 @ 05:48)          Contact number: serge [phone] or [phone]

## 2022-07-31 NOTE — PROGRESS NOTE ADULT - SUBJECTIVE AND OBJECTIVE BOX
Transplant Surgery - Multidisciplinary Rounds  --------------------------------------------------------------  DDRT    Date: 4/21/2022            66M with PMH: DM type II, HTN, CAD s/p CABG in 2020, AFib on Eliquis, CVA in 2019 due to Afib, Seizure d/o following CVA, last episode was on 4/8/22, h/o GIB in 2/2022 EGD with duodenal ulcer.  ESRD due to DM was on HD since 2019.     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  Donor was 58 , KDPI 82%, DCD, single vessels and ureter, HLA mismatch 1, 2, 2. No DSA, cPRA 0%. 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 Re-admitted in May for anemia in the setting of large perinephric hematoma s/p evacuation and repair of arterial anastomosis on 5/2/22. Intra operative biopsy showed no rejection, Creatinine ranging ~2mg/dL.    In Rehab:  He was recently dx with klebsiella UTI rx with ertapenem - then switched to Levaquin day #6.    He also had parainfluenza pneumonia. Was at rehab progressing well.      Hospital course:  - 6/10: transferred from rehab facility for seizure status epilepticus. Intubated for respiratory protection and transferred to MICU.  EEG showed no seizure activity. Neuro consulted.   - 6/11: Extubated. Found to have R pleural effusion. s/p Thoracentesis 1.3L. Eliquis changed to heparin drip.  Post procedure drop in H&H. 5u PRBC, 2 u FFP.   - 6/11 evening: Transferred to SICU from MICU for further care in setting of hypoxia, significant R pleural effusion, anemia and hemodynamic instability  - 6/11 Intubated at 9pm and sedated on Precedex.  CT placed, 600ml blood drained.  1L total overnight, started pressors  - 6/11 Received Protamine for PTT >100 (was on Heparin drip started for AF), 2 u FFP and 5u PRBC total  - 6/13 s/p VATS 2.2L old blood removed; second chest tube placed  - 6/18-19: chest tubes removed  - 6/21: ?PRES vs. chronic ischemic changes on MRI, off Envarsus, switched to Belatacept 6/23 with good graft function  - 6/25: Tx to SICU for refractory seizures, improved with IV antiepileptic regimen  - 7/10: Passed bedside S&S in SICU. Downgraded from SICU to floor.   - 7/11: OR-->URETERAL STENT REMOVED  - 7/15: ESBL Kleb UTI (50-90K), completed Ertapenem x 3 Days  - 7/25: Tx to SICU for Sepsis/ elevated LFTS  - 7/27: Shiley placed for HD  - 7/28: ongoing fevers -> started Ertapenem   - 7/29: HD + 1 pRBC  - 7/30: febrile again, cultures sent     Present:   Patient seen and examined with multidisciplinary team including Transplant Surgeon: Dr. Tena, Transplant Nephrologist: Dr. Alfred, resident: Dr. Prashant Acosta, and unit RN during am rounds.  Disciplines not in attendance will be notified of the plan.     Interval Events: Febrile on 7/30 during the day. Afebrile overnight, but hypertensive with new bradycardia (HR 40's->50's). Denies any symptoms. Denies chest pain, palpitations, dizziness, weakness, vision changes.     Induction:  Thymoglobulin                                        Maintenance:  - Belatacept: 6/23, 7/4, 7/8, 7/18. Next dose 8/1  - MMF held (was on 500 BID leukopenia), Pred 5mg daily   - Ongoing monitoring for signs of rejection.    Potential Discharge date: pending clinical improvement.     Education:  Medications    Plan of care:  See Below    MEDICATIONS  (STANDING):  carvedilol 25 milliGRAM(s) Oral every 12 hours  chlorhexidine 2% Cloths 1 Application(s) Topical <User Schedule>  doxazosin 4 milliGRAM(s) Oral <User Schedule>  epoetin cortney-epbx (RETACRIT) Injectable 31116 Unit(s) SubCutaneous every 7 days  ertapenem  IVPB 500 milliGRAM(s) IV Intermittent every 24 hours  finasteride 5 milliGRAM(s) Oral daily  fluconAZOLE IVPB 200 milliGRAM(s) IV Intermittent every 24 hours  insulin glargine Injectable (LANTUS) 5 Unit(s) SubCutaneous at bedtime  insulin lispro (ADMELOG) corrective regimen sliding scale   SubCutaneous three times a day with meals  insulin lispro (ADMELOG) corrective regimen sliding scale   SubCutaneous at bedtime  insulin lispro Injectable (ADMELOG) 4 Unit(s) SubCutaneous three times a day before meals  levothyroxine 50 MICROGram(s) Oral daily  melatonin 6 milliGRAM(s) Oral at bedtime  methylPREDNISolone sodium succinate IVPB 250 milliGRAM(s) IV Intermittent once  mycophenolate mofetil 1000 milliGRAM(s) Oral every 12 hours  NIFEdipine XL 60 milliGRAM(s) Oral daily  pantoprazole    Tablet 40 milliGRAM(s) Oral daily  phenytoin   Capsule 100 milliGRAM(s) Oral every 8 hours  polyethylene glycol 3350 17 Gram(s) Oral daily  senna 2 Tablet(s) Oral at bedtime  sodium bicarbonate 1300 milliGRAM(s) Oral <User Schedule>    MEDICATIONS  (PRN):  acetaminophen     Tablet .. 1000 milliGRAM(s) Oral every 6 hours PRN Temp greater or equal to 38C (100.4F), Mild Pain (1 - 3)      PAST MEDICAL & SURGICAL HISTORY:  Hypertension      Diabetes      Dyslipidemia      CAD (Coronary Artery Disease)  with Stents in 06/2009 and 6/2019, s/p off-pump C3L on 8/13/20      Hypothyroidism      CVA (cerebral vascular accident)  12/13/19 with residual bilateral weakness      Anemia      PEG (percutaneous endoscopic gastrostomy) status  removed July 2020      Intubation of airway performed without difficulty  Dec 2019  CVA c/b status epilepticus requiring intubation and PEG in 12/2019-      ESRD on dialysis  M/W/F      Seizures  after CVA, last seizure was last week 4/07/22      History of insertion of stent into coronary artery bypass graft  2009 and 2019      S/P CABG x 3  off pump C3L on 8/13/20          Vital Signs Last 24 Hrs  T(C): 36.1 (31 Jul 2022 03:00), Max: 38.6 (30 Jul 2022 13:00)  T(F): 97 (31 Jul 2022 03:00), Max: 101.5 (30 Jul 2022 13:00)  HR: 48 (31 Jul 2022 07:00) (48 - 65)  BP: 187/78 (31 Jul 2022 07:00) (110/52 - 187/78)  BP(mean): 112 (31 Jul 2022 07:00) (75 - 118)  RR: 22 (31 Jul 2022 07:00) (11 - 29)  SpO2: 97% (31 Jul 2022 07:00) (92% - 99%)    Parameters below as of 30 Jul 2022 19:25  Patient On (Oxygen Delivery Method): room air        I&O's Summary    30 Jul 2022 07:01  -  31 Jul 2022 07:00  --------------------------------------------------------  IN: 580 mL / OUT: 1560 mL / NET: -980 mL                              10.0   7.43  )-----------( 398      ( 30 Jul 2022 22:45 )             30.3     07-30    135  |  97  |  42<H>  ----------------------------<  313<H>  4.3   |  25  |  2.52<H>    Ca    8.4      30 Jul 2022 22:45  Phos  2.9     07-30  Mg     2.2     07-30    TPro  6.1  /  Alb  2.4<L>  /  TBili  0.4  /  DBili  x   /  AST  67<H>  /  ALT  103<H>  /  AlkPhos  449<H>  07-30            Review of systems: patient answering questions, but unable to complete detailed ROS. Denies abdominal pain, chills, nausea, or vomiting.       PHYSICAL EXAM:  Constitutional: awake, reduced alertness  Eyes: Anicteric  ENMT: nc/at  Respiratory: not tachypneic, non-labored on RA    Cardiovascular: normotensive, regular rate on monitor   Gastrointestinal: Soft, non distended, nontender   Genitourinary: Urinary catheter in place with scant urine in tube   Extremities: SCD's in place and working bilaterally  Vascular: Palpable dp pulses bilaterally  Neurological: oriented to year, place, and self; answering simple questions, but slowly   Skin: no rashes, ulcerations or lesions  Musculoskeletal: Moving all extremities  Psychiatric: Responsive     Transplant Surgery - Multidisciplinary Rounds  --------------------------------------------------------------  DDRT    Date: 4/21/2022            66M with PMH: DM type II, HTN, CAD s/p CABG in 2020, AFib on Eliquis, CVA in 2019 due to Afib, Seizure d/o following CVA, last episode was on 4/8/22, h/o GIB in 2/2022 EGD with duodenal ulcer.  ESRD due to DM was on HD since 2019.     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  Donor was 58 , KDPI 82%, DCD, single vessels and ureter, HLA mismatch 1, 2, 2. No DSA, cPRA 0%. 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 Re-admitted in May for anemia in the setting of large perinephric hematoma s/p evacuation and repair of arterial anastomosis on 5/2/22. Intra operative biopsy showed no rejection, Creatinine ranging ~2mg/dL.    In Rehab:  He was recently dx with klebsiella UTI rx with ertapenem - then switched to Levaquin day #6.    He also had parainfluenza pneumonia. Was at rehab progressing well.      Hospital course:  - 6/10: transferred from rehab facility for seizure status epilepticus. Intubated for respiratory protection and transferred to MICU.  EEG showed no seizure activity. Neuro consulted.   - 6/11: Extubated. Found to have R pleural effusion. s/p Thoracentesis 1.3L. Eliquis changed to heparin drip.  Post procedure drop in H&H. 5u PRBC, 2 u FFP.   - 6/11 evening: Transferred to SICU from MICU for further care in setting of hypoxia, significant R pleural effusion, anemia and hemodynamic instability  - 6/11 Intubated at 9pm and sedated on Precedex.  CT placed, 600ml blood drained.  1L total overnight, started pressors  - 6/11 Received Protamine for PTT >100 (was on Heparin drip started for AF), 2 u FFP and 5u PRBC total  - 6/13 s/p VATS 2.2L old blood removed; second chest tube placed  - 6/18-19: chest tubes removed  - 6/21: ?PRES vs. chronic ischemic changes on MRI, off Envarsus, switched to Belatacept 6/23 with good graft function  - 6/25: Tx to SICU for refractory seizures, improved with IV antiepileptic regimen  - 7/10: Passed bedside S&S in SICU. Downgraded from SICU to floor.   - 7/11: OR-->URETERAL STENT REMOVED  - 7/15: ESBL Kleb UTI (50-90K), completed Ertapenem x 3 Days  - 7/25: Tx to SICU for Sepsis/ elevated LFTS  - 7/27: Shiley placed for HD  - 7/28: ongoing fevers -> started Ertapenem   - 7/29: HD + 1 pRBC  - 7/30: febrile again, cultures sent     Present:   Patient seen and examined with multidisciplinary team including Transplant Surgeon: Dr. Tena, Transplant Nephrologist: Dr. Alfred, resident: Dr. Prashant Acosta, and unit RN during am rounds.  Disciplines not in attendance will be notified of the plan.     Interval Events: Febrile on 7/30 during the day. Afebrile overnight, but hypertensive with new bradycardia (HR 40's->50's). Denies any symptoms. Denies chest pain, palpitations, dizziness, weakness, vision changes.     Induction:  Thymoglobulin                                        Maintenance:  - Belatacept: 6/23, 7/4, 7/8, 7/18. Next dose 8/1  - MMF held (was on 500 BID leukopenia), Pred 5mg daily   - Ongoing monitoring for signs of rejection.    Potential Discharge date: pending clinical improvement.     Education:  Medications    Plan of care:  See Below    MEDICATIONS  (STANDING):  carvedilol 25 milliGRAM(s) Oral every 12 hours  chlorhexidine 2% Cloths 1 Application(s) Topical <User Schedule>  doxazosin 4 milliGRAM(s) Oral <User Schedule>  epoetin cortney-epbx (RETACRIT) Injectable 69154 Unit(s) SubCutaneous every 7 days  ertapenem  IVPB 500 milliGRAM(s) IV Intermittent every 24 hours  finasteride 5 milliGRAM(s) Oral daily  fluconAZOLE IVPB 200 milliGRAM(s) IV Intermittent every 24 hours  insulin glargine Injectable (LANTUS) 5 Unit(s) SubCutaneous at bedtime  insulin lispro (ADMELOG) corrective regimen sliding scale   SubCutaneous three times a day with meals  insulin lispro (ADMELOG) corrective regimen sliding scale   SubCutaneous at bedtime  insulin lispro Injectable (ADMELOG) 4 Unit(s) SubCutaneous three times a day before meals  levothyroxine 50 MICROGram(s) Oral daily  melatonin 6 milliGRAM(s) Oral at bedtime  methylPREDNISolone sodium succinate IVPB 250 milliGRAM(s) IV Intermittent once  mycophenolate mofetil 1000 milliGRAM(s) Oral every 12 hours  NIFEdipine XL 60 milliGRAM(s) Oral daily  pantoprazole    Tablet 40 milliGRAM(s) Oral daily  phenytoin   Capsule 100 milliGRAM(s) Oral every 8 hours  polyethylene glycol 3350 17 Gram(s) Oral daily  senna 2 Tablet(s) Oral at bedtime  sodium bicarbonate 1300 milliGRAM(s) Oral <User Schedule>    MEDICATIONS  (PRN):  acetaminophen     Tablet .. 1000 milliGRAM(s) Oral every 6 hours PRN Temp greater or equal to 38C (100.4F), Mild Pain (1 - 3)      PAST MEDICAL & SURGICAL HISTORY:  Hypertension      Diabetes      Dyslipidemia      CAD (Coronary Artery Disease)  with Stents in 06/2009 and 6/2019, s/p off-pump C3L on 8/13/20      Hypothyroidism      CVA (cerebral vascular accident)  12/13/19 with residual bilateral weakness      Anemia      PEG (percutaneous endoscopic gastrostomy) status  removed July 2020      Intubation of airway performed without difficulty  Dec 2019  CVA c/b status epilepticus requiring intubation and PEG in 12/2019-      ESRD on dialysis  M/W/F      Seizures  after CVA, last seizure was last week 4/07/22      History of insertion of stent into coronary artery bypass graft  2009 and 2019      S/P CABG x 3  off pump C3L on 8/13/20          Vital Signs Last 24 Hrs  T(C): 36.1 (31 Jul 2022 03:00), Max: 38.6 (30 Jul 2022 13:00)  T(F): 97 (31 Jul 2022 03:00), Max: 101.5 (30 Jul 2022 13:00)  HR: 48 (31 Jul 2022 07:00) (48 - 65)  BP: 187/78 (31 Jul 2022 07:00) (110/52 - 187/78)  BP(mean): 112 (31 Jul 2022 07:00) (75 - 118)  RR: 22 (31 Jul 2022 07:00) (11 - 29)  SpO2: 97% (31 Jul 2022 07:00) (92% - 99%)    Parameters below as of 30 Jul 2022 19:25  Patient On (Oxygen Delivery Method): room air        I&O's Summary    30 Jul 2022 07:01  -  31 Jul 2022 07:00  --------------------------------------------------------  IN: 580 mL / OUT: 1560 mL / NET: -980 mL                              10.0   7.43  )-----------( 398      ( 30 Jul 2022 22:45 )             30.3     07-30    135  |  97  |  42<H>  ----------------------------<  313<H>  4.3   |  25  |  2.52<H>    Ca    8.4      30 Jul 2022 22:45  Phos  2.9     07-30  Mg     2.2     07-30    TPro  6.1  /  Alb  2.4<L>  /  TBili  0.4  /  DBili  x   /  AST  67<H>  /  ALT  103<H>  /  AlkPhos  449<H>  07-30            Review of systems: patient answering questions, but unable to complete detailed ROS. Denies abdominal pain, chills, nausea, or vomiting.       PHYSICAL EXAM:  Constitutional: awake, reduced alertness  Eyes: Anicteric  ENMT: nc/at  Respiratory: not tachypneic, non-labored on RA    Cardiovascular: normotensive, regular rate on monitor   Gastrointestinal: Soft, non distended, nontender, erythema at RLQ incision site with associated warmth, no fluctuance or associated lesions   Genitourinary: Urinary catheter in place with scant urine in tube   Extremities: SCD's in place and working bilaterally  Vascular: Palpable dp pulses bilaterally  Neurological: oriented to year, place, and self; answering simple questions, but slowly   Skin: no rashes, ulcerations or lesions  Musculoskeletal: Moving all extremities  Psychiatric: Responsive     Transplant Surgery - Multidisciplinary Rounds  --------------------------------------------------------------  DDRT    Date: 4/21/2022            66M with PMH: DM type II, HTN, CAD s/p CABG in 2020, AFib on Eliquis, CVA in 2019 due to Afib, Seizure d/o following CVA, last episode was on 4/8/22, h/o GIB in 2/2022 EGD with duodenal ulcer.  ESRD due to DM was on HD since 2019.     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  Donor was 58 , KDPI 82%, DCD, single vessels and ureter, HLA mismatch 1, 2, 2. No DSA, cPRA 0%. 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 Re-admitted in May for anemia in the setting of large perinephric hematoma s/p evacuation and repair of arterial anastomosis on 5/2/22. Intra operative biopsy showed no rejection, Creatinine ranging ~2mg/dL.    In Rehab:  He was recently dx with klebsiella UTI rx with ertapenem - then switched to Levaquin day #6.    He also had parainfluenza pneumonia. Was at rehab progressing well.      Hospital course:  - 6/10: transferred from rehab facility for seizure status epilepticus. Intubated for respiratory protection and transferred to MICU.  EEG showed no seizure activity. Neuro consulted.   - 6/11: Extubated. Found to have R pleural effusion. s/p Thoracentesis 1.3L. Eliquis changed to heparin drip.  Post procedure drop in H&H. 5u PRBC, 2 u FFP.   - 6/11 evening: Transferred to SICU from MICU for further care in setting of hypoxia, significant R pleural effusion, anemia and hemodynamic instability  - 6/11 Intubated at 9pm and sedated on Precedex.  CT placed, 600ml blood drained.  1L total overnight, started pressors  - 6/11 Received Protamine for PTT >100 (was on Heparin drip started for AF), 2 u FFP and 5u PRBC total  - 6/13 s/p VATS 2.2L old blood removed; second chest tube placed  - 6/18-19: chest tubes removed  - 6/21: ?PRES vs. chronic ischemic changes on MRI, off Envarsus, switched to Belatacept 6/23 with good graft function  - 6/25: Tx to SICU for refractory seizures, improved with IV antiepileptic regimen  - 7/10: Passed bedside S&S in SICU. Downgraded from SICU to floor.   - 7/11: OR-->URETERAL STENT REMOVED  - 7/15: ESBL Kleb UTI (50-90K), completed Ertapenem x 3 Days  - 7/25: Tx to SICU for Sepsis/ elevated LFTS  - 7/27: Shiley placed for HD  - 7/28: ongoing fevers -> started Ertapenem   - 7/29: HD + 1 pRBC  - 7/30: febrile again, cultures sent     Present:   Patient seen and examined with multidisciplinary team including Transplant Surgeon: Dr. Tena, Transplant Nephrologist: Dr. Alfred, resident: Dr. Prashant Acosta, and unit RN during am rounds.  Disciplines not in attendance will be notified of the plan.     Interval Events: Febrile on 7/30 during the day. Afebrile overnight, but hypertensive with new bradycardia (HR 40's->50's). Denies any symptoms. Denies chest pain, palpitations, dizziness, weakness, vision changes. HD on 7/30 (-1.5 L) w/ 1 pRBC transfusion afterwards.     Induction:  Thymoglobulin                                        Maintenance:  - Belatacept: 6/23, 7/4, 7/8, 7/18. Next dose 8/1  - MMF held (was on 500 BID leukopenia), Pred 5mg daily   - Ongoing monitoring for signs of rejection.    Potential Discharge date: pending clinical improvement.     Education:  Medications    Plan of care:  See Below    MEDICATIONS  (STANDING):  carvedilol 25 milliGRAM(s) Oral every 12 hours  chlorhexidine 2% Cloths 1 Application(s) Topical <User Schedule>  doxazosin 4 milliGRAM(s) Oral <User Schedule>  epoetin cortney-epbx (RETACRIT) Injectable 04126 Unit(s) SubCutaneous every 7 days  ertapenem  IVPB 500 milliGRAM(s) IV Intermittent every 24 hours  finasteride 5 milliGRAM(s) Oral daily  fluconAZOLE IVPB 200 milliGRAM(s) IV Intermittent every 24 hours  insulin glargine Injectable (LANTUS) 5 Unit(s) SubCutaneous at bedtime  insulin lispro (ADMELOG) corrective regimen sliding scale   SubCutaneous three times a day with meals  insulin lispro (ADMELOG) corrective regimen sliding scale   SubCutaneous at bedtime  insulin lispro Injectable (ADMELOG) 4 Unit(s) SubCutaneous three times a day before meals  levothyroxine 50 MICROGram(s) Oral daily  melatonin 6 milliGRAM(s) Oral at bedtime  methylPREDNISolone sodium succinate IVPB 250 milliGRAM(s) IV Intermittent once  mycophenolate mofetil 1000 milliGRAM(s) Oral every 12 hours  NIFEdipine XL 60 milliGRAM(s) Oral daily  pantoprazole    Tablet 40 milliGRAM(s) Oral daily  phenytoin   Capsule 100 milliGRAM(s) Oral every 8 hours  polyethylene glycol 3350 17 Gram(s) Oral daily  senna 2 Tablet(s) Oral at bedtime  sodium bicarbonate 1300 milliGRAM(s) Oral <User Schedule>    MEDICATIONS  (PRN):  acetaminophen     Tablet .. 1000 milliGRAM(s) Oral every 6 hours PRN Temp greater or equal to 38C (100.4F), Mild Pain (1 - 3)      PAST MEDICAL & SURGICAL HISTORY:  Hypertension      Diabetes      Dyslipidemia      CAD (Coronary Artery Disease)  with Stents in 06/2009 and 6/2019, s/p off-pump C3L on 8/13/20      Hypothyroidism      CVA (cerebral vascular accident)  12/13/19 with residual bilateral weakness      Anemia      PEG (percutaneous endoscopic gastrostomy) status  removed July 2020      Intubation of airway performed without difficulty  Dec 2019  CVA c/b status epilepticus requiring intubation and PEG in 12/2019-      ESRD on dialysis  M/W/F      Seizures  after CVA, last seizure was last week 4/07/22      History of insertion of stent into coronary artery bypass graft  2009 and 2019      S/P CABG x 3  off pump C3L on 8/13/20          Vital Signs Last 24 Hrs  T(C): 36.1 (31 Jul 2022 03:00), Max: 38.6 (30 Jul 2022 13:00)  T(F): 97 (31 Jul 2022 03:00), Max: 101.5 (30 Jul 2022 13:00)  HR: 48 (31 Jul 2022 07:00) (48 - 65)  BP: 187/78 (31 Jul 2022 07:00) (110/52 - 187/78)  BP(mean): 112 (31 Jul 2022 07:00) (75 - 118)  RR: 22 (31 Jul 2022 07:00) (11 - 29)  SpO2: 97% (31 Jul 2022 07:00) (92% - 99%)    Parameters below as of 30 Jul 2022 19:25  Patient On (Oxygen Delivery Method): room air        I&O's Summary    30 Jul 2022 07:01  -  31 Jul 2022 07:00  --------------------------------------------------------  IN: 580 mL / OUT: 1560 mL / NET: -980 mL                              10.0   7.43  )-----------( 398      ( 30 Jul 2022 22:45 )             30.3     07-30    135  |  97  |  42<H>  ----------------------------<  313<H>  4.3   |  25  |  2.52<H>    Ca    8.4      30 Jul 2022 22:45  Phos  2.9     07-30  Mg     2.2     07-30    TPro  6.1  /  Alb  2.4<L>  /  TBili  0.4  /  DBili  x   /  AST  67<H>  /  ALT  103<H>  /  AlkPhos  449<H>  07-30            Review of systems: patient answering questions, but unable to complete detailed ROS. Denies abdominal pain, chills, nausea, or vomiting.       PHYSICAL EXAM:  Constitutional: awake, reduced alertness  Eyes: Anicteric  ENMT: nc/at  Respiratory: not tachypneic, non-labored on RA    Cardiovascular: normotensive, regular rate on monitor   Gastrointestinal: Soft, non distended, nontender, erythema at RLQ incision site with associated warmth, no fluctuance or associated lesions   Genitourinary: Urinary catheter in place with scant urine in tube   Extremities: SCD's in place and working bilaterally  Vascular: Palpable dp pulses bilaterally  Neurological: oriented to year, place, and self; answering simple questions, but slowly   Skin: no rashes, ulcerations or lesions  Musculoskeletal: Moving all extremities  Psychiatric: Responsive

## 2022-07-31 NOTE — PROGRESS NOTE ADULT - SUBJECTIVE AND OBJECTIVE BOX
--------------------------------------------------------------------------------  Chief Complaint:    No new symptoms    24 hour events/subjective:    Recviewed    PAST HISTORY  --------------------------------------------------------------------------------  No significant changes to PMH, PSH, FHx, SHx, unless otherwise noted    ALLERGIES & MEDICATIONS  --------------------------------------------------------------------------------  Allergies    No Known Allergies    Intolerances      Standing Inpatient Medications  carvedilol 25 milliGRAM(s) Oral every 12 hours  chlorhexidine 2% Cloths 1 Application(s) Topical <User Schedule>  doxazosin 4 milliGRAM(s) Oral <User Schedule>  epoetin cortney-epbx (RETACRIT) Injectable 97222 Unit(s) SubCutaneous every 7 days  ertapenem  IVPB 500 milliGRAM(s) IV Intermittent every 24 hours  everolimus (ZORTRESS) 0.5 milliGRAM(s) Oral every 12 hours  finasteride 5 milliGRAM(s) Oral daily  fluconAZOLE IVPB 200 milliGRAM(s) IV Intermittent every 24 hours  heparin   Injectable 5000 Unit(s) SubCutaneous every 8 hours  insulin glargine Injectable (LANTUS) 5 Unit(s) SubCutaneous at bedtime  insulin lispro (ADMELOG) corrective regimen sliding scale   SubCutaneous three times a day with meals  insulin lispro (ADMELOG) corrective regimen sliding scale   SubCutaneous at bedtime  insulin lispro Injectable (ADMELOG) 4 Unit(s) SubCutaneous three times a day before meals  levothyroxine 50 MICROGram(s) Oral daily  melatonin 6 milliGRAM(s) Oral at bedtime  mycophenolate mofetil 1000 milliGRAM(s) Oral every 12 hours  NIFEdipine XL 60 milliGRAM(s) Oral every 12 hours  pantoprazole    Tablet 40 milliGRAM(s) Oral daily  phenytoin   Capsule 100 milliGRAM(s) Oral every 8 hours  polyethylene glycol 3350 17 Gram(s) Oral daily  senna 2 Tablet(s) Oral at bedtime  sodium bicarbonate 1300 milliGRAM(s) Oral <User Schedule>    PRN Inpatient Medications  acetaminophen     Tablet .. 650 milliGRAM(s) Oral every 6 hours PRN      REVIEW OF SYSTEMS  --------------------------------------------------------------------------------  NO abdominal pain/vomiting/diarrhea/head ache/fever    All other systems were reviewed and are negative, except as noted.    VITALS/PHYSICAL EXAM  --------------------------------------------------------------------------------  T(C): 36.3 (07-31-22 @ 11:00), Max: 38.6 (07-30-22 @ 14:00)  HR: 50 (07-31-22 @ 12:00) (48 - 65)  BP: 180/79 (07-31-22 @ 12:00) (110/52 - 188/74)  RR: 24 (07-31-22 @ 12:00) (11 - 28)  SpO2: 97% (07-31-22 @ 12:00) (92% - 99%)  Height (cm): 177.8 (07-29-22 @ 14:36)  Weight (kg): 61.7 (07-29-22 @ 14:36)  BMI (kg/m2): 19.5 (07-29-22 @ 14:36)  BSA (m2): 1.77 (07-29-22 @ 14:36)      07-30-22 @ 07:01  -  07-31-22 @ 07:00  --------------------------------------------------------  IN: 580 mL / OUT: 1560 mL / NET: -980 mL    07-31-22 @ 07:01  -  07-31-22 @ 13:44  --------------------------------------------------------  IN: 387 mL / OUT: 15 mL / NET: 372 mL      Physical Exam:  	Gen: NAD,   	HEENT: no acute signs  	Pulm: CTA B/L  	CV:  no rub  	Abd: +BS, soft, nontender/nondistended                      Transplant: No tenderness   	   	UE:  no asterixis  	LE:   edema+  	Neuro: No acute focal deficits   	Psych: Normal affect and mood         LABS/STUDIES  --------------------------------------------------------------------------------              10.0   7.43  >-----------<  398      [07-30-22 @ 22:45]              30.3     135  |  97  |  42  ----------------------------<  313      [07-30-22 @ 22:45]  4.3   |  25  |  2.52        Ca     8.4     [07-30-22 @ 22:45]      Mg     2.2     [07-30-22 @ 22:45]      Phos  2.9     [07-30-22 @ 22:45]    TPro  6.1  /  Alb  2.4  /  TBili  0.4  /  DBili  x   /  AST  67  /  ALT  103  /  AlkPhos  449  [07-30-22 @ 22:45]    PT/INR: PT 15.9 , INR 1.37       [07-30-22 @ 22:45]  PTT: 32.2       [07-30-22 @ 22:45]          [07-30-22 @ 13:02]    Creatinine Trend:  SCr 2.52 [07-30 @ 22:45]  SCr 2.65 [07-29 @ 21:14]  SCr 3.41 [07-28 @ 23:12]  SCr 2.57 [07-28 @ 00:55]  SCr 2.24 [07-27 @ 22:17]       Urinalysis - [07-25-22 @ 18:28]      Color Yellow / Appearance Slightly Turbid / SG 1.020 / pH 5.5      Gluc Negative / Ketone Negative  / Bili Negative / Urobili Negative       Blood Small / Protein 100 / Leuk Est Large / Nitrite Negative      RBC 2 / WBC 40 / Hyaline 5 / Gran  / Sq Epi  / Non Sq Epi 0 / Bacteria Many    Urine Creatinine 129      [07-26-22 @ 04:17]  Urine Sodium 20      [07-26-22 @ 04:17]  Urine Osmolality 387      [07-26-22 @ 04:17]    Iron 17, TIBC 171, %sat 10      [05-02-22 @ 13:03]  Ferritin 6336      [07-27-22 @ 13:00]  PTH -- (Ca 9.2)      [02-05-22 @ 10:13]   294  HbA1c 6.0      [02-21-20 @ 08:53]  TSH 4.69      [07-06-22 @ 18:36]  Lipid: chol --, , HDL --, LDL --      [07-27-22 @ 13:00]    HBsAg Nonreact      [07-25-22 @ 11:51]  HCV 0.18, Nonreact      [07-25-22 @ 11:51]

## 2022-07-31 NOTE — PROGRESS NOTE ADULT - ASSESSMENT
66M with h/o DM type II, HTN, CAD s/p CABG in 2020, Afib on Eliquis, CVA in 2019 due to Afib, Seizure d/o (last episode was on 4/8/22), h/o GI bleed in 2/2022 EGD with duodenal ulcer and ESRD on HD s/p R DDRT from DCD donor on 4/21/22 complicated by DGF requiring HD until 4/29/22 now with good graft function who presented with status epilepticus in setting of UTI/antibiotics and sub-therapeutic valproic acid level. Transferred to SICU on 7/25 with signs of sepsis. Biopsy from 7/29 demonstrating grade 1a rejection. Started pulse steroids (Solumedrol 500 on 7/30 -> 250 on 7/31). Also febrile on 7/30, but afebrile overnight.         Acute Transaminitis - unchanged, but improved from onset  - Hepatology following   - hepatotoxic medications held  - RUQ abd- Abdominal doppler - no PVT   - CT a/p non contrast- small perinephric collection   - Labs: Hepatitis panel, BK PCR, EBV PCR, CMV with PCR -> negative  - Infectious w/u: bld cxs with NGTD; urine cx with Kleb pneumo   - ID following: on Erta started 7/28 now off miguel   - On  empiric fluc    - Check IgG level -> may benefit from IVIG given ongoing fever despite broad-spectrum antibiotic coverage     R DCD DDRT 4/21/22 now with ANA  - S/P removal of ureteral stent on 7/12  - s/p HD 7/27, 7/29  - s/p IR Biopsy (7/29) - grade 1 a rejection -> Solumedrol 500 BID (7/30), 250 BID (7/31), restart cellcept   - Continue Doxazosin/Proscar for BPH  - OOB with RN/PT  - Leukopenia: valcyte  - F/u results of DSA     Immunosuppression:   - Belatacept: 6/23, 7/4, 7/8. Re-loaded 7/8,  as there was a gap between doses 2/2 seizures. 7/18,  next dose 8/1  - continue Pred 5  - PPx: Valcyte/ bactrim on hold.    Seizure Disorder:  - MRI head 6/27 with less concerns for PRES, likely ischemic changes  - Remains seizure free  - Antiepileptic regimen per Neurology, on Phenytoin 100mg TID, no further adjustment required  - Keep Mag > 2    DM  - on Lantus/Lispro, Endocrine following     AFib  RIJ DVT   - Eliquis with ~40% less efficacy while on fosphenytoin.    - Lovenox held for ANA and transaminitis   - rate controlled    HTN:  - Nifedipine/Cardura/Coreg.   - dced Hydralazine (in setting of transaminitis )

## 2022-07-31 NOTE — PROGRESS NOTE ADULT - ATTENDING COMMENTS
67 yo m, s/p DDKT, DGF, s/p HD 1U PRBC. In SICU for AMS, increase Cr and LFTs.    - Delirium overnight appears to be well now.  - Sleep apnea, resume home CPAP.  - Nicardipine to bid.  - Steroid boluses per protocol [place] 1A.  - 1U PRBC on HD yesterday. Hgb 10.  - DVT resume chemical, d/w primary.  - AF today.  - Continue ertapenem, fluconazole. Monitor cx.  - ISS.

## 2022-07-31 NOTE — PROGRESS NOTE ADULT - ASSESSMENT
ASSESSMENT:  67 y/o male w/ a PMHx of CAD s/p CABG, AF on Eliquis c/b CVA c/b seizures, HTN, HLD, DM type II, hypothyroidism, GI bleed due to a duodenal ulcer, and ESRD s/p DDRT 4/21/22 c/b DGF requiring HD & large perinephric hematoma 5/22 s/p evacuation w/ revision of arterial anastomosis who presented in status epilepticus 6/10 from rehab requiring intubation for airway protection with a hospital course c/b large right pleural effusion s/p thoracentesis c/b hemothorax while being anticoagulated & requiring intubation for airway protection s/p chest tube placement w/ CT demonstrating retained hemothorax s/p right VATS, status epilepticus again causing AMS requiring intubation for airway protection again, delirium, dysphagia requiring NGT placement for enteral access, hyperkalemia, and poor glycemic control. Pt transferred to floor 7/10 and readmitted to SICU 7/25 for leukopenia, transaminitis, and ANA.     Neuro:  - Phenytoin 100 q8 for seizure ppx  - Appreciate neuro recs, no need to repeat levels at this time  - Multimodal pain control w/Tylenol  - Melatonin + protected sleep    Resp: no acute issues  - Out of bed to chair, ambulate as tolerated, and incentive spirometry to prevent atelectasis    CVS: HDS  - Nifedipine 60 mg qd  - Coreg 25 mg q12    GI:   - Regular diet, soft and bite sized   - Transaminitis continues to improve, continue holding hydralazine, lipitor, bactrim  - Bowel regimen with Miralax, holding senna due to multiple bowel movements overnight    Renal: DDRT 4/21 cb DGF, now w cf acute rejection, s/p IR renal bx 7/29, path pending   - Crt elevated  - Low uop   - HD last 7/30, PRN per Transplant nephro   - F/u IR renal bx pathology  - Monitor I&Os and electrolytes  - replete electrolytes as needed   - Doxazosin + Finasteride  - IV locked    Heme:  - Monitor CBC and coags  - SCDs  - holding VTE ppx for now pending procedure    ID: ESBL klebsiella UTI (7/15 + UCx, 7/25 +UCx)  - Blood cx negative 7/25, 7/27  - F/u Rpt blood cx   - Febrile w Tmax 38.6 on 7/30  - Monitor WBC, temperature, and procalcitonin  - Erbapenam until 8/3 + Fluconazole  -Immunosuppresion per transplant nephro     Endo: hx of DM, Hypothyroidism  - High dose Solumedrol in setting of possible acute graft rejection   - Monitor glucose of bmp  - Lantus 5, premeal 4, and ISS  - Home Synthroid

## 2022-08-01 NOTE — PROGRESS NOTE ADULT - SUBJECTIVE AND OBJECTIVE BOX
Transplant Surgery - Multidisciplinary Rounds  --------------------------------------------------------------  DDRT    Date: 4/21/2022            66M with PMH: DM type II, HTN, CAD s/p CABG in 2020, AFib on Eliquis, CVA in 2019 due to Afib, Seizure d/o following CVA, last episode was on 4/8/22, h/o GIB in 2/2022 EGD with duodenal ulcer.  ESRD due to DM was on HD since 2019.     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  Donor was 58 , KDPI 82%, DCD, single vessels and ureter, HLA mismatch 1, 2, 2. No DSA, cPRA 0%. 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 Re-admitted in May for anemia in the setting of large perinephric hematoma s/p evacuation and repair of arterial anastomosis on 5/2/22. Intra operative biopsy showed no rejection, Creatinine ranging ~2mg/dL.    In Rehab:  He was recently dx with klebsiella UTI rx with ertapenem - then switched to Levaquin day #6.    He also had parainfluenza pneumonia. Was at rehab progressing well.      Hospital course:  - 6/10: transferred from rehab facility for seizure status epilepticus. Intubated for respiratory protection and transferred to MICU.  EEG showed no seizure activity. Neuro consulted.   - 6/11: Extubated. Found to have R pleural effusion. s/p Thoracentesis 1.3L. Eliquis changed to heparin drip.  Post procedure drop in H&H. 5u PRBC, 2 u FFP.   - 6/11 evening: Transferred to SICU from MICU for further care in setting of hypoxia, significant R pleural effusion, anemia and hemodynamic instability  - 6/11 Intubated at 9pm and sedated on Precedex.  CT placed, 600ml blood drained.  1L total overnight, started pressors  - 6/11 Received Protamine for PTT >100 (was on Heparin drip started for AF), 2 u FFP and 5u PRBC total  - 6/13 s/p VATS 2.2L old blood removed; second chest tube placed  - 6/18-19: chest tubes removed  - 6/21: ?PRES vs. chronic ischemic changes on MRI, off Envarsus, switched to Belatacept 6/23 with good graft function  - 6/25: Tx to SICU for refractory seizures, improved with IV antiepileptic regimen  - 7/10: Passed bedside S&S in SICU. Downgraded from SICU to floor.   - 7/11: OR-->URETERAL STENT REMOVED  - 7/15: ESBL Kleb UTI (50-90K), completed Ertapenem x 3 Days  - 7/25: Tx to SICU for Sepsis/ elevated LFTS  - 7/27: Shiley placed for HD  - 7/28: ongoing fevers -> started Ertapenem   - 7/29: HD + 1 pRBC  - 7/30: febrile again, cultures sent     Present:   Patient seen and examined with multidisciplinary team including Transplant Surgeon: Dr. David, Dr. Tena, Transplant Nephrologist: Dr. AKILAH Lomeli, Transplant Pharmacist Bryan Lim and Josiah Martinez, TAMMY Montelongo, surgical resident and  and unit RN during am rounds.  Disciplines not in attendance will be notified of the plan.       Interval events:   - Now afebrile, hypertensive overnight and bradycardic   - MS improving  - Renal tx bx pos 1A rejection  - Pulse dose steroids Solumedrol 250 mg, started on everolimus  - started on insulin gtt for hyperglycemia         Induction:  Thymoglobulin                                        Maintenance:  - Belatacept: 6/23, 7/4, 7/8, 7/18. Next dose 8/1 (today)  - MMF held (was on 500 BID leukopenia), Pred 5mg daily   - Ongoing monitoring for signs of rejection.    Potential Discharge date: pending clinical improvement.     Education:  Medications    Plan of care:  See Below      MEDICATIONS  (STANDING):  belatacept IVPB 625 milliGRAM(s) IV Intermittent once  chlorhexidine 2% Cloths 1 Application(s) Topical <User Schedule>  doxazosin 4 milliGRAM(s) Oral <User Schedule>  epoetin cortney-epbx (RETACRIT) Injectable 86575 Unit(s) SubCutaneous every 7 days  ertapenem  IVPB 500 milliGRAM(s) IV Intermittent every 24 hours  finasteride 5 milliGRAM(s) Oral daily  fluconAZOLE IVPB 200 milliGRAM(s) IV Intermittent every 24 hours  heparin   Injectable 5000 Unit(s) SubCutaneous every 8 hours  insulin glargine Injectable (LANTUS) 10 Unit(s) SubCutaneous at bedtime  insulin regular Infusion 3 Unit(s)/Hr (3 mL/Hr) IV Continuous <Continuous>  levothyroxine 50 MICROGram(s) Oral daily  melatonin 6 milliGRAM(s) Oral at bedtime  methylPREDNISolone sodium succinate IVPB 250 milliGRAM(s) IV Intermittent once  mycophenolate mofetil 1000 milliGRAM(s) Oral every 12 hours  NIFEdipine XL 60 milliGRAM(s) Oral every 12 hours  pantoprazole    Tablet 40 milliGRAM(s) Oral daily  phenytoin   Capsule 100 milliGRAM(s) Oral every 8 hours  polyethylene glycol 3350 17 Gram(s) Oral daily  predniSONE   Tablet 5 milliGRAM(s) Oral <User Schedule>  senna 2 Tablet(s) Oral at bedtime  sirolimus 1 milliGRAM(s) Oral once  sodium bicarbonate 1300 milliGRAM(s) Oral <User Schedule>    MEDICATIONS  (PRN):  acetaminophen     Tablet .. 650 milliGRAM(s) Oral every 6 hours PRN Mild Pain (1 - 3)      PAST MEDICAL & SURGICAL HISTORY:  Hypertension      Diabetes      Dyslipidemia      CAD (Coronary Artery Disease)  with Stents in 06/2009 and 6/2019, s/p off-pump C3L on 8/13/20      Hypothyroidism      CVA (cerebral vascular accident)  12/13/19 with residual bilateral weakness      Anemia      PEG (percutaneous endoscopic gastrostomy) status  removed July 2020      Intubation of airway performed without difficulty  Dec 2019  CVA c/b status epilepticus requiring intubation and PEG in 12/2019-      ESRD on dialysis  M/W/F      Seizures  after CVA, last seizure was last week 4/07/22      History of insertion of stent into coronary artery bypass graft  2009 and 2019      S/P CABG x 3  off pump C3L on 8/13/20          Vital Signs Last 24 Hrs  T(C): 35.8 (01 Aug 2022 11:00), Max: 36.7 (31 Jul 2022 20:00)  T(F): 96.4 (01 Aug 2022 11:00), Max: 98 (31 Jul 2022 20:00)  HR: 42 (01 Aug 2022 14:00) (40 - 60)  BP: 150/68 (01 Aug 2022 14:00) (115/56 - 194/83)  BP(mean): 98 (01 Aug 2022 14:00) (80 - 120)  RR: 12 (01 Aug 2022 14:00) (11 - 30)  SpO2: 99% (01 Aug 2022 14:00) (92% - 100%)    Parameters below as of 01 Aug 2022 11:00  Patient On (Oxygen Delivery Method): room air        I&O's Summary    31 Jul 2022 07:01  -  01 Aug 2022 07:00  --------------------------------------------------------  IN: 934 mL / OUT: 70 mL / NET: 864 mL    01 Aug 2022 07:01  -  01 Aug 2022 15:47  --------------------------------------------------------  IN: 120 mL / OUT: 0 mL / NET: 120 mL          LABS:                        10.5   9.81  )-----------( 444      ( 01 Aug 2022 05:43 )             32.1     08-01    134<L>  |  95<L>  |  70<H>  ----------------------------<  232<H>  4.4   |  23  |  3.59<H>    Ca    8.7      01 Aug 2022 05:43  Phos  4.0     08-01  Mg     2.4     08-01    TPro  5.9<L>  /  Alb  2.1<L>  /  TBili  0.3  /  DBili  x   /  AST  39  /  ALT  64<H>  /  AlkPhos  352<H>  08-01    PT/INR - ( 01 Aug 2022 05:43 )   PT: 14.9 sec;   INR: 1.28 ratio         PTT - ( 01 Aug 2022 05:43 )  PTT:29.9 sec    CAPILLARY BLOOD GLUCOSE      POCT Blood Glucose.: 161 mg/dL (01 Aug 2022 15:31)  POCT Blood Glucose.: 140 mg/dL (01 Aug 2022 14:28)  POCT Blood Glucose.: 132 mg/dL (01 Aug 2022 13:25)  POCT Blood Glucose.: 124 mg/dL (01 Aug 2022 12:26)  POCT Blood Glucose.: 125 mg/dL (01 Aug 2022 11:22)  POCT Blood Glucose.: 133 mg/dL (01 Aug 2022 10:15)  POCT Blood Glucose.: 133 mg/dL (01 Aug 2022 09:08)  POCT Blood Glucose.: 142 mg/dL (01 Aug 2022 09:05)  POCT Blood Glucose.: 173 mg/dL (01 Aug 2022 07:55)  POCT Blood Glucose.: 190 mg/dL (01 Aug 2022 06:53)  POCT Blood Glucose.: 194 mg/dL (01 Aug 2022 06:11)  POCT Blood Glucose.: 226 mg/dL (01 Aug 2022 05:08)  POCT Blood Glucose.: 257 mg/dL (01 Aug 2022 04:07)  POCT Blood Glucose.: 288 mg/dL (01 Aug 2022 03:01)  POCT Blood Glucose.: 301 mg/dL (01 Aug 2022 02:06)  POCT Blood Glucose.: 354 mg/dL (01 Aug 2022 00:06)  POCT Blood Glucose.: 402 mg/dL (31 Jul 2022 22:36)  POCT Blood Glucose.: 448 mg/dL (31 Jul 2022 20:25)  POCT Blood Glucose.: 390 mg/dL (31 Jul 2022 17:13)    LIVER FUNCTIONS - ( 01 Aug 2022 05:43 )  Alb: 2.1 g/dL / Pro: 5.9 g/dL / ALK PHOS: 352 U/L / ALT: 64 U/L / AST: 39 U/L / GGT: x             Culture - Blood (collected 07-30-22 @ 06:19)  Source: .Blood Blood-Peripheral  Preliminary Report (07-31-22 @ 11:01):    No growth to date.    Culture - Blood (collected 07-30-22 @ 06:07)  Source: .Blood Blood-Peripheral  Preliminary Report (07-31-22 @ 11:01):    No growth to date.    Culture - Blood (collected 07-27-22 @ 04:34)  Source: .Blood Blood-Peripheral  Final Report (08-01-22 @ 07:01):    No Growth Final    Culture - Blood (collected 07-27-22 @ 04:34)  Source: .Blood Blood-Peripheral  Final Report (08-01-22 @ 07:01):    No Growth Final    Culture - Fungal, Blood (collected 07-27-22 @ 04:34)  Source: .Blood Blood  Preliminary Report (07-28-22 @ 10:47):    Testing in progress    Culture - Urine (collected 07-25-22 @ 18:27)  Source: Catheterized Catheterized  Final Report (07-28-22 @ 09:06):    50,000 - 99,000 CFU/mL Klebsiella pneumoniae ESBL  Organism: Klebsiella pneumoniae ESBL (07-28-22 @ 09:06)  Organism: Klebsiella pneumoniae ESBL (07-28-22 @ 09:06)          Cultures:    Culture - Blood (collected 07-30-22 @ 06:19)  Source: .Blood Blood-Peripheral  Preliminary Report (07-31-22 @ 11:01):    No growth to date.    Culture - Blood (collected 07-30-22 @ 06:07)  Source: .Blood Blood-Peripheral  Preliminary Report (07-31-22 @ 11:01):    No growth to date.    Culture - Blood (collected 07-27-22 @ 04:34)  Source: .Blood Blood-Peripheral  Final Report (08-01-22 @ 07:01):    No Growth Final    Culture - Blood (collected 07-27-22 @ 04:34)  Source: .Blood Blood-Peripheral  Final Report (08-01-22 @ 07:01):    No Growth Final    Culture - Fungal, Blood (collected 07-27-22 @ 04:34)  Source: .Blood Blood  Preliminary Report (07-28-22 @ 10:47):    Testing in progress    Culture - Urine (collected 07-25-22 @ 18:27)  Source: Catheterized Catheterized  Final Report (07-28-22 @ 09:06):    50,000 - 99,000 CFU/mL Klebsiella pneumoniae ESBL  Organism: Klebsiella pneumoniae ESBL (07-28-22 @ 09:06)  Organism: Klebsiella pneumoniae ESBL (07-28-22 @ 09:06)          Review of systems: patient answering questions, but unable to complete detailed ROS. Denies abdominal pain, chills, nausea, or vomiting.       PHYSICAL EXAM:  Constitutional: awake, reduced alertness  Eyes: Anicteric  ENMT: nc/at  Respiratory: not tachypneic, non-labored on RA    Cardiovascular: normotensive, regular rate on monitor   Gastrointestinal: Soft, non distended, nontender, erythema at RLQ incision site with associated warmth, no fluctuance or associated lesions   Genitourinary: Urinary catheter in place with scant urine in tube   Extremities: SCD's in place and working bilaterally  Vascular: Palpable dp pulses bilaterally  Neurological: oriented to year, place, and self; answering simple questions, but slowly   Skin: no rashes, ulcerations or lesions  Musculoskeletal: Moving all extremities  Psychiatric: Responsive

## 2022-08-01 NOTE — PROGRESS NOTE ADULT - PROBLEM SELECTOR PLAN 3
c/w LT4 50 mcg daily   Please make sure LT4 is given on an empty stomach at least one hour apart from other meds and food to ensure med absorption. DO NOT GIVE WITH VITAMINS/ANTACIDS  - monitor TFTs outpatient in 4 weeks after dc with Dr. Lincoln.

## 2022-08-01 NOTE — PROGRESS NOTE ADULT - ASSESSMENT
66 yr old Male with PMHx ESRD s/p permacath removal 5/10/22 s/p Rt DDRT 4/21/22,  CVA (2019), Afib on apixaban, seizure activity, CAD s/p stents, CABG (2020), HTN, HLD, DM on insulin, gastric/duodenal ulcer, recent hospitalization 4/28/22 -5/10/22 with weakness/anemia found to have perinephric hematoma requiring evacuation and repair of bleeding arterial anastomosis. Curently being treated for UTI with Levaquin Today after developing multiple sz episodes refractory to 5 mg versed IM (EMS) and 2 mg ativan IV in E.D. concerning for status epilepticus requiring intubation for hypoxic respiratory  failure. MICU Consult was called for hypoxic respiratory failure secondary to status epilepticus.  Nephrology consulted for renal failure.     A/P  DDRT on 04/21/22  Course complicated by DGF, was on HD until 4/29/22. Readmitted in May for anemia in the setting of large perinephric hematoma s/p evacuation and repair of arterial anastomosis on 5/02/22. Intra operative biopsy showed no rejection.  ANA was initially sec to  hemodynamic change   stent removal 07/12/22  scr trending up now fluctuating.   Febrile, etiology?, being w/u by ID   No hydronephrosis noted on renal US. UA done on 7/25/22 showed LE and pyuria.   allograft US 07/28/22 shows Interval increase in the resistive indices and peak systolic velocities   compared to prior study performed yesterday. Short-term imaging follow-up   if clinically indicated.  Grade 1a rejection (biopsy on 7/29) - s/p pulse dose steroids  continue Immunosuppression per transplant team  last HD 07/30/22  HD per transplant team   transplant team on board   allograft US, DSA negative   monitor BMP, U/O     HTN   optimal   manage by transplant team    monitor BP closely

## 2022-08-01 NOTE — PROGRESS NOTE ADULT - PROBLEM SELECTOR PLAN 1
-Test BG q1h  -C/w insulin drip and adjust rate to BG goal  -Can increase Lantus dose to 10 units at bedtime.    -Will switch to basal/bolus once pt is more stable and also eating more consistently with consistent steroid doses  -Please inform endo team of any changes on steroid therapy or PO/TF status  Discharge  plan to rehab, c/w basal bolus insulin final doses as above if FBG at goal  Titrate further at rehab as necessary .   Outpatient f/u with Endocrinologist Dr. Lincoln. [address] [facility] [address]. Phone  Needs apt at time of discharge  -Needs optho/renal/cardiac f/u as out pt  -Make sure pt has insulin and DM supplies at time of discharge.

## 2022-08-01 NOTE — PROGRESS NOTE ADULT - ASSESSMENT
66M with h/o DM type II, HTN, CAD s/p CABG in 2020, Afib on Eliquis, CVA in 2019 due to Afib, Seizure d/o (last episode was on 4/8/22), h/o GI bleed in 2/2022 EGD with duodenal ulcer and ESRD on HD s/p R DDRT from DCD donor on 4/21/22 complicated by DGF requiring HD until 4/29/22 now with good graft function who presented with status epilepticus in setting of UTI/antibiotics and sub-therapeutic valproic acid level. Transferred to SICU on 7/25 with signs of sepsis. Biopsy from 7/29 demonstrating grade 1a rejection. Started pulse steroids (Solumedrol 500 on 7/30 -> 250 on 7/31). Also febrile on 7/30, but afebrile overnight.         Acute Transaminitis - unchanged, but improved from onset  - Hepatology following   - hepatotoxic medications held  - RUQ abd- Abdominal doppler - no PVT   - CT a/p non contrast- small perinephric collection   - Labs: Hepatitis panel, BK PCR, EBV PCR, CMV with PCR -> negative  - Infectious w/u: bld cxs with NGTD; urine cx with Kleb pneumo   - ID following: on Erta started 7/28 now off miguel   - On  empiric fluc    - Check IgG level -> may benefit from IVIG given ongoing fever despite broad-spectrum antibiotic coverage     R DCD DDRT 4/21/22 now with ANA  - S/P removal of ureteral stent on 7/12  - s/p HD 7/27, 7/29  - s/p IR Biopsy (7/29) - grade 1 a rejection -> Solumedrol 500 BID (7/30), 250 BID (7/31), Solu 250 mg x1 today,  cellcept 1gm bid    - Continue Doxazosin/Proscar for BPH  - OOB with RN/PT  - Leukopenia: valcyte  - F/u results of DSA     Immunosuppression:   - Belatacept: 6/23, 7/4, 7/8. Re-loaded 7/8,  as there was a gap between doses 2/2 seizures. 7/18,  next dose 8/1  - continue Pred 5  - PPx: Valcyte/ bactrim on hold.    Seizure Disorder:  - MRI head 6/27 with less concerns for PRES, likely ischemic changes  - Remains seizure free  - Antiepileptic regimen per Neurology, on Phenytoin 100mg TID, no further adjustment required  - Keep Mag > 2    DM  - now on insulin gtt   - Endocrine following     AFib  RIJ DVT   - Eliquis with ~40% less efficacy while on fosphenytoin.    - Lovenox held for ANA and transaminitis   - rate controlled    HTN:  - Nifedipine/Cardura/Coreg.   - dced Hydralazine (in setting of transaminitis )

## 2022-08-01 NOTE — PROGRESS NOTE ADULT - SUBJECTIVE AND OBJECTIVE BOX
Interventional Radiology Follow-Up Note.    Patient seen and examined @ bedside around 7:30am.    This is a 66y Male s/p RLQ transplant renal biopsy on 7/29/22 in Interventional Radiology with Dr. Fortune.    No complaint offered.      Medications:     carvedilol: (07-31)  doxazosin: (08-01)  ertapenem  IVPB: (07-31)  fluconAZOLE IVPB: (08-01)  heparin   Injectable: (08-01)  NIFEdipine XL: (07-31)  NIFEdipine XL: (08-01)    Vitals:  T(F): 96.4, Max: 98 (20:00)  HR: 42  BP: 150/68  RR: 12  SpO2: 99%    Physical Exam:  General: Nontoxic, in NAD.  Abdomen: Soft, Distended/ ND, nontender. Dressing c/d/i.      Aspartate Aminotransferase (AST/SGOT): 39 U/L (08-01-22 @ 05:43)  Alanine Aminotransferase (ALT/SGPT): 64 U/L (08-01-22 @ 05:43)  Aspartate Aminotransferase (AST/SGOT): 67 U/L (07-30-22 @ 22:45)  Alanine Aminotransferase (ALT/SGPT): 103 U/L (07-30-22 @ 22:45)        LABS:  Na: 134 (08-01 @ 05:43), 135 (07-30 @ 22:45), 137 (07-29 @ 21:14)  K: 4.4 (08-01 @ 05:43), 4.3 (07-30 @ 22:45), 3.9 (07-29 @ 21:14)  Cl: 95 (08-01 @ 05:43), 97 (07-30 @ 22:45), 98 (07-29 @ 21:14)  CO2: 23 (08-01 @ 05:43), 25 (07-30 @ 22:45), 26 (07-29 @ 21:14)  BUN: 70 (08-01 @ 05:43), 42 (07-30 @ 22:45), 51 (07-29 @ 21:14)  Cr: 3.59 (08-01 @ 05:43), 2.52 (07-30 @ 22:45), 2.65 (07-29 @ 21:14)  Glu: 232(08-01 @ 05:43), 313(07-30 @ 22:45), 149(07-29 @ 21:14)    Hgb: 10.5 (08-01 @ 05:43), 10.0 (07-30 @ 22:45), 8.3 (07-29 @ 21:14)  Hct: 32.1 (08-01 @ 05:43), 30.3 (07-30 @ 22:45), 25.5 (07-29 @ 21:14)  WBC: 9.81 (08-01 @ 05:43), 7.43 (07-30 @ 22:45), 4.88 (07-29 @ 21:14)  Plt: 444 (08-01 @ 05:43), 398 (07-30 @ 22:45), 388 (07-29 @ 21:14)    INR: 1.28 08-01-22 @ 05:43, 1.37 07-30-22 @ 22:45, 1.37 07-29-22 @ 21:14  PTT: 29.9 08-01-22 @ 05:43, 32.2 07-30-22 @ 22:45, 31.6 07-29-22 @ 21:14          LIVER FUNCTIONS - ( 01 Aug 2022 05:43 )  Alb: 2.1 g/dL / Pro: 5.9 g/dL / ALK PHOS: 352 U/L / ALT: 64 U/L / AST: 39 U/L / GGT: x                    Assessment/Plan:  66 year old Male with PMHx ESRD s/p PermCath removal 5/10/22 s/p Rt DDRT 4/21/22,  CVA (2019), Afib on apixaban, seizure activity, CAD s/p stents, CABG (2020), HTN, HLD, DM on insulin, gastric/duodenal ulcer, recent hospitalization 4/28/22 -5/10/22 with weakness/anemia found to have perinephric hematoma requiring evacuation and repair of bleeding arterial anastomosis who presented to [facility] for status epilepticus requiring intubation for hypoxic respiratory failure. Now becoming anuric and was previously making urine c/f possible ATN or rejection. IR c/s for biopsy and injection of contrast into the collection system to evaluate for obstruction.  Pt most recently s/p  RLQ transplant renal biopsy on 7/29/22 in Interventional Radiology.      7/29 a/p renal bx and Urogram with visualization of contrast from the lower pole calyx to the bladder. No obstruction visualized.  - Dressing removed  - f/u pathology results  - IR will sign off    Please call IR at [phone] with any questions, concerns, or issues regarding above.    Also available on Teams.

## 2022-08-01 NOTE — PROGRESS NOTE ADULT - SUBJECTIVE AND OBJECTIVE BOX
SICU Daily Progress Note  =====================================================  Interval/Overnight Events:     - No overnight events  - Insulin gtt    HPI:    Allergies: No Known Allergies      MEDICATIONS:   --------------------------------------------------------------------------------------  Neurologic Medications  acetaminophen     Tablet .. 650 milliGRAM(s) Oral every 6 hours PRN Mild Pain (1 - 3)  melatonin 6 milliGRAM(s) Oral at bedtime  phenytoin   Capsule 100 milliGRAM(s) Oral every 8 hours    Respiratory Medications    Cardiovascular Medications  carvedilol 25 milliGRAM(s) Oral every 12 hours  doxazosin 4 milliGRAM(s) Oral <User Schedule>  NIFEdipine XL 60 milliGRAM(s) Oral every 12 hours    Gastrointestinal Medications  pantoprazole    Tablet 40 milliGRAM(s) Oral daily  polyethylene glycol 3350 17 Gram(s) Oral daily  senna 2 Tablet(s) Oral at bedtime  sodium bicarbonate 1300 milliGRAM(s) Oral <User Schedule>    Genitourinary Medications    Hematologic/Oncologic Medications  epoetin cortney-epbx (RETACRIT) Injectable 95512 Unit(s) SubCutaneous every 7 days  everolimus (ZORTRESS) 0.5 milliGRAM(s) Oral every 12 hours  heparin   Injectable 5000 Unit(s) SubCutaneous every 8 hours  mycophenolate mofetil 1000 milliGRAM(s) Oral every 12 hours    Antimicrobial/Immunologic Medications  ertapenem  IVPB 500 milliGRAM(s) IV Intermittent every 24 hours  fluconAZOLE IVPB 200 milliGRAM(s) IV Intermittent every 24 hours    Endocrine/Metabolic Medications  dextrose 50% Injectable 50 milliLiter(s) IV Push every 15 minutes  finasteride 5 milliGRAM(s) Oral daily  insulin glargine Injectable (LANTUS) 5 Unit(s) SubCutaneous at bedtime  insulin lispro (ADMELOG) corrective regimen sliding scale   SubCutaneous three times a day before meals  insulin lispro (ADMELOG) corrective regimen sliding scale   SubCutaneous at bedtime  insulin lispro Injectable (ADMELOG) 5 Unit(s) SubCutaneous three times a day before meals  insulin regular Infusion 3 Unit(s)/Hr IV Continuous <Continuous>  levothyroxine 50 MICROGram(s) Oral daily  predniSONE   Tablet 5 milliGRAM(s) Oral <User Schedule>    Topical/Other Medications  chlorhexidine 2% Cloths 1 Application(s) Topical <User Schedule>    --------------------------------------------------------------------------------------    VITAL SIGNS, INS/OUTS (last 24 hours):  --------------------------------------------------------------------------------------  T(C): 35.6 (07-31-22 @ 23:00), Max: 36.7 (07-31-22 @ 20:00)  HR: 46 (08-01-22 @ 00:00) (46 - 60)  BP: 135/63 (08-01-22 @ 00:00) (135/63 - 194/83)  RR: 20 (08-01-22 @ 00:00) (15 - 28)  SpO2: 100% (08-01-22 @ 00:00) (92% - 100%)    07-30-22 @ 07:01  -  07-31-22 @ 07:00  --------------------------------------------------------  IN: 580 mL / OUT: 1560 mL / NET: -980 mL    07-31-22 @ 07:01  -  08-01-22 @ 01:29  --------------------------------------------------------  IN: 911 mL / OUT: 55 mL / NET: 856 mL      --------------------------------------------------------------------------------------    EXAM  NEUROLOGY  Exam: Normal, NAD, alert, oriented x3, no focal deficits.    HEENT  Exam: Normocephalic, atraumatic, EOMI.     RESPIRATORY  Exam: Normal expansion/effort.  Mechanical Ventilation:     CARDIOVASCULAR  Exam: Regular rate and rhythm.       GI/NUTRITION  Exam: Abdomen soft, Non-tender, Non-distended.                  LABS  --------------------------------------------------------------------------------------                        10.0   7.43  )-----------( 398      ( 30 Jul 2022 22:45 )             30.3   07-30    135  |  97  |  42<H>  ----------------------------<  313<H>  4.3   |  25  |  2.52<H>    Ca    8.4      30 Jul 2022 22:45  Phos  2.9     07-30  Mg     2.2     07-30    TPro  6.1  /  Alb  2.4<L>  /  TBili  0.4  /  DBili  x   /  AST  67<H>  /  ALT  103<H>  /  AlkPhos  449<H>  07-30  PT/INR - ( 30 Jul 2022 22:45 )   PT: 15.9 sec;   INR: 1.37 ratio         PTT - ( 30 Jul 2022 22:45 )  PTT:32.2 sec  --------------------------------------------------------------------------------------     SICU Daily Progress Note  =====================================================  Interval/Overnight Events:     - No overnight events  - Insulin gtt started    HPI:    Allergies: No Known Allergies      MEDICATIONS:   --------------------------------------------------------------------------------------  Neurologic Medications  acetaminophen     Tablet .. 650 milliGRAM(s) Oral every 6 hours PRN Mild Pain (1 - 3)  melatonin 6 milliGRAM(s) Oral at bedtime  phenytoin   Capsule 100 milliGRAM(s) Oral every 8 hours    Respiratory Medications    Cardiovascular Medications  carvedilol 25 milliGRAM(s) Oral every 12 hours  doxazosin 4 milliGRAM(s) Oral <User Schedule>  NIFEdipine XL 60 milliGRAM(s) Oral every 12 hours    Gastrointestinal Medications  pantoprazole    Tablet 40 milliGRAM(s) Oral daily  polyethylene glycol 3350 17 Gram(s) Oral daily  senna 2 Tablet(s) Oral at bedtime  sodium bicarbonate 1300 milliGRAM(s) Oral <User Schedule>    Genitourinary Medications    Hematologic/Oncologic Medications  epoetin cortney-epbx (RETACRIT) Injectable 86386 Unit(s) SubCutaneous every 7 days  everolimus (ZORTRESS) 0.5 milliGRAM(s) Oral every 12 hours  heparin   Injectable 5000 Unit(s) SubCutaneous every 8 hours  mycophenolate mofetil 1000 milliGRAM(s) Oral every 12 hours    Antimicrobial/Immunologic Medications  ertapenem  IVPB 500 milliGRAM(s) IV Intermittent every 24 hours  fluconAZOLE IVPB 200 milliGRAM(s) IV Intermittent every 24 hours    Endocrine/Metabolic Medications  dextrose 50% Injectable 50 milliLiter(s) IV Push every 15 minutes  finasteride 5 milliGRAM(s) Oral daily  insulin glargine Injectable (LANTUS) 5 Unit(s) SubCutaneous at bedtime  insulin lispro (ADMELOG) corrective regimen sliding scale   SubCutaneous three times a day before meals  insulin lispro (ADMELOG) corrective regimen sliding scale   SubCutaneous at bedtime  insulin lispro Injectable (ADMELOG) 5 Unit(s) SubCutaneous three times a day before meals  insulin regular Infusion 3 Unit(s)/Hr IV Continuous <Continuous>  levothyroxine 50 MICROGram(s) Oral daily  predniSONE   Tablet 5 milliGRAM(s) Oral <User Schedule>    Topical/Other Medications  chlorhexidine 2% Cloths 1 Application(s) Topical <User Schedule>    --------------------------------------------------------------------------------------    VITAL SIGNS, INS/OUTS (last 24 hours):  --------------------------------------------------------------------------------------  T(C): 35.6 (07-31-22 @ 23:00), Max: 36.7 (07-31-22 @ 20:00)  HR: 46 (08-01-22 @ 00:00) (46 - 60)  BP: 135/63 (08-01-22 @ 00:00) (135/63 - 194/83)  RR: 20 (08-01-22 @ 00:00) (15 - 28)  SpO2: 100% (08-01-22 @ 00:00) (92% - 100%)    07-30-22 @ 07:01  -  07-31-22 @ 07:00  --------------------------------------------------------  IN: 580 mL / OUT: 1560 mL / NET: -980 mL    07-31-22 @ 07:01  -  08-01-22 @ 01:29  --------------------------------------------------------  IN: 911 mL / OUT: 55 mL / NET: 856 mL      --------------------------------------------------------------------------------------    EXAM  NEUROLOGY  Exam: Normal, NAD, alert, oriented x3, no focal deficits.    HEENT  Exam: Normocephalic, atraumatic, EOMI.     RESPIRATORY  Exam: Normal expansion/effort.  Mechanical Ventilation:     CARDIOVASCULAR  Exam: Regular rate and rhythm.       GI/NUTRITION  Exam: Abdomen soft, Non-tender, Non-distended.                  LABS  --------------------------------------------------------------------------------------                        10.0   7.43  )-----------( 398      ( 30 Jul 2022 22:45 )             30.3   07-30    135  |  97  |  42<H>  ----------------------------<  313<H>  4.3   |  25  |  2.52<H>    Ca    8.4      30 Jul 2022 22:45  Phos  2.9     07-30  Mg     2.2     07-30    TPro  6.1  /  Alb  2.4<L>  /  TBili  0.4  /  DBili  x   /  AST  67<H>  /  ALT  103<H>  /  AlkPhos  449<H>  07-30  PT/INR - ( 30 Jul 2022 22:45 )   PT: 15.9 sec;   INR: 1.37 ratio         PTT - ( 30 Jul 2022 22:45 )  PTT:32.2 sec  --------------------------------------------------------------------------------------     SICU Daily Progress Note  =====================================================  Interval/Overnight Events:     - No overnight events  - Insulin gtt started    HPI:  In no acute distress. Sitting in chair this morning, denies fevers, chills, NVD, abdominal pain, SOB, CP  Allergies: No Known Allergies      MEDICATIONS:   --------------------------------------------------------------------------------------  Neurologic Medications  acetaminophen     Tablet .. 650 milliGRAM(s) Oral every 6 hours PRN Mild Pain (1 - 3)  melatonin 6 milliGRAM(s) Oral at bedtime  phenytoin   Capsule 100 milliGRAM(s) Oral every 8 hours    Respiratory Medications    Cardiovascular Medications  carvedilol 25 milliGRAM(s) Oral every 12 hours  doxazosin 4 milliGRAM(s) Oral <User Schedule>  NIFEdipine XL 60 milliGRAM(s) Oral every 12 hours    Gastrointestinal Medications  pantoprazole    Tablet 40 milliGRAM(s) Oral daily  polyethylene glycol 3350 17 Gram(s) Oral daily  senna 2 Tablet(s) Oral at bedtime  sodium bicarbonate 1300 milliGRAM(s) Oral <User Schedule>    Genitourinary Medications    Hematologic/Oncologic Medications  epoetin cortney-epbx (RETACRIT) Injectable 64752 Unit(s) SubCutaneous every 7 days  everolimus (ZORTRESS) 0.5 milliGRAM(s) Oral every 12 hours  heparin   Injectable 5000 Unit(s) SubCutaneous every 8 hours  mycophenolate mofetil 1000 milliGRAM(s) Oral every 12 hours    Antimicrobial/Immunologic Medications  ertapenem  IVPB 500 milliGRAM(s) IV Intermittent every 24 hours  fluconAZOLE IVPB 200 milliGRAM(s) IV Intermittent every 24 hours    Endocrine/Metabolic Medications  dextrose 50% Injectable 50 milliLiter(s) IV Push every 15 minutes  finasteride 5 milliGRAM(s) Oral daily  insulin glargine Injectable (LANTUS) 5 Unit(s) SubCutaneous at bedtime  insulin lispro (ADMELOG) corrective regimen sliding scale   SubCutaneous three times a day before meals  insulin lispro (ADMELOG) corrective regimen sliding scale   SubCutaneous at bedtime  insulin lispro Injectable (ADMELOG) 5 Unit(s) SubCutaneous three times a day before meals  insulin regular Infusion 3 Unit(s)/Hr IV Continuous <Continuous>  levothyroxine 50 MICROGram(s) Oral daily  predniSONE   Tablet 5 milliGRAM(s) Oral <User Schedule>    Topical/Other Medications  chlorhexidine 2% Cloths 1 Application(s) Topical <User Schedule>    --------------------------------------------------------------------------------------    VITAL SIGNS, INS/OUTS (last 24 hours):  --------------------------------------------------------------------------------------  T(C): 35.6 (07-31-22 @ 23:00), Max: 36.7 (07-31-22 @ 20:00)  HR: 46 (08-01-22 @ 00:00) (46 - 60)  BP: 135/63 (08-01-22 @ 00:00) (135/63 - 194/83)  RR: 20 (08-01-22 @ 00:00) (15 - 28)  SpO2: 100% (08-01-22 @ 00:00) (92% - 100%)    07-30-22 @ 07:01  -  07-31-22 @ 07:00  --------------------------------------------------------  IN: 580 mL / OUT: 1560 mL / NET: -980 mL    07-31-22 @ 07:01  -  08-01-22 @ 01:29  --------------------------------------------------------  IN: 911 mL / OUT: 55 mL / NET: 856 mL      --------------------------------------------------------------------------------------    EXAM  NEUROLOGY  Exam: Normal, NAD, alert, oriented x3, no focal deficits.    HEENT  Exam: Normocephalic, atraumatic, EOMI.     RESPIRATORY  Exam: Normal expansion/effort.  Mechanical Ventilation:     CARDIOVASCULAR  Exam: Regular rate and rhythm.       GI/NUTRITION  Exam: Abdomen soft, Non-tender, Non-distended.                  LABS  --------------------------------------------------------------------------------------                        10.0   7.43  )-----------( 398      ( 30 Jul 2022 22:45 )             30.3   07-30    135  |  97  |  42<H>  ----------------------------<  313<H>  4.3   |  25  |  2.52<H>    Ca    8.4      30 Jul 2022 22:45  Phos  2.9     07-30  Mg     2.2     07-30    TPro  6.1  /  Alb  2.4<L>  /  TBili  0.4  /  DBili  x   /  AST  67<H>  /  ALT  103<H>  /  AlkPhos  449<H>  07-30  PT/INR - ( 30 Jul 2022 22:45 )   PT: 15.9 sec;   INR: 1.37 ratio         PTT - ( 30 Jul 2022 22:45 )  PTT:32.2 sec  --------------------------------------------------------------------------------------

## 2022-08-01 NOTE — PROGRESS NOTE ADULT - SUBJECTIVE AND OBJECTIVE BOX
DIABETES FOLLOW UP NOTE: Saw pt earlier today    Chief Complaint: Endocrine consult requested for management of T2DM    INTERVAL HX: Pt remains in SICU due to sepsis and transaminitis and now treated for possible rejection on high steroid doses (MTP 500mg 7/30> 250mg 7/31 and getting a dose today per ICU team) Planning to c/w iv steroids for now. BG up to 400s requiring insulin drip up to 4 units/hr in addition to low dose Lantus at hs. Pt sleeping at time of visit not waking up to talk to this provider. Son at bedside reports not eating much. Remains with worsening creat with anuria.       Review of Systems:  General: As above  Not answering questions    Allergies    No Known Allergies    Intolerances      MEDICATIONS:  ertapenem  IVPB 500 milliGRAM(s) IV Intermittent every 24 hours  finasteride 5 milliGRAM(s) Oral daily  fluconAZOLE IVPB 200 milliGRAM(s) IV Intermittent every 24 hours  insulin glargine Injectable (LANTUS) 10 Unit(s) SubCutaneous at bedtime  insulin regular Infusion 3 Unit(s)/Hr (3 mL/Hr) IV Continuous <Continuous>  levothyroxine 50 MICROGram(s) Oral daily  predniSONE   Tablet 5 milliGRAM(s) Oral <User Schedule>        PHYSICAL EXAM:  VITALS: T(C): 35.8 (08-01-22 @ 11:00)  T(F): 96.4 (08-01-22 @ 11:00), Max: 98 (07-31-22 @ 20:00)  HR: 43 (08-01-22 @ 17:00) (40 - 60)  BP: 177/77 (08-01-22 @ 17:00) (115/56 - 194/83)  RR:  (11 - 30)  SpO2:  (92% - 100%)  Wt(kg): --  GENERAL: Male laying in bed in NAD  Abdomen: Soft, nontender, non distended  Extremities: Warm, no edema in all 4 exts  NEURO: Not talking today    LABS:  POCT Blood Glucose.: 125 mg/dL (08-01-22 @ 17:25)  POCT Blood Glucose.: 135 mg/dL (08-01-22 @ 16:36)  POCT Blood Glucose.: 161 mg/dL (08-01-22 @ 15:31)  POCT Blood Glucose.: 140 mg/dL (08-01-22 @ 14:28)  POCT Blood Glucose.: 132 mg/dL (08-01-22 @ 13:25)  POCT Blood Glucose.: 124 mg/dL (08-01-22 @ 12:26)  POCT Blood Glucose.: 125 mg/dL (08-01-22 @ 11:22)  POCT Blood Glucose.: 133 mg/dL (08-01-22 @ 10:15)  POCT Blood Glucose.: 133 mg/dL (08-01-22 @ 09:08)  POCT Blood Glucose.: 142 mg/dL (08-01-22 @ 09:05)  POCT Blood Glucose.: 173 mg/dL (08-01-22 @ 07:55)  POCT Blood Glucose.: 190 mg/dL (08-01-22 @ 06:53) IV Steroids  POCT Blood Glucose.: 194 mg/dL (08-01-22 @ 06:11)  POCT Blood Glucose.: 226 mg/dL (08-01-22 @ 05:08)  POCT Blood Glucose.: 257 mg/dL (08-01-22 @ 04:07)  POCT Blood Glucose.: 288 mg/dL (08-01-22 @ 03:01)  POCT Blood Glucose.: 301 mg/dL (08-01-22 @ 02:06)  POCT Blood Glucose.: 354 mg/dL (08-01-22 @ 00:06)  POCT Blood Glucose.: 402 mg/dL (07-31-22 @ 22:36)  POCT Blood Glucose.: 448 mg/dL (07-31-22 @ 20:25)  POCT Blood Glucose.: 390 mg/dL (07-31-22 @ 17:13)  POCT Blood Glucose.: 271 mg/dL (07-31-22 @ 11:48)  POCT Blood Glucose.: 275 mg/dL (07-31-22 @ 07:59)  POCT Blood Glucose.: 296 mg/dL (07-30-22 @ 21:39)  POCT Blood Glucose.: 230 mg/dL (07-30-22 @ 17:48)  POCT Blood Glucose.: 125 mg/dL (07-30-22 @ 12:35)  POCT Blood Glucose.: 101 mg/dL (07-30-22 @ 08:21)  POCT Blood Glucose.: 144 mg/dL (07-29-22 @ 21:03)  POCT Blood Glucose.: 132 mg/dL (07-29-22 @ 17:36)                            10.5   9.81  )-----------( 444      ( 01 Aug 2022 05:43 )             32.1       08-01    134<L>  |  95<L>  |  70<H>  ----------------------------<  232<H>  4.4   |  23  |  3.59<H>    eGFR: 18<L>    Ca    8.7      08-01  Mg     2.4     08-01  Phos  4.0     08-01    TPro  5.9<L>  /  Alb  2.1<L>  /  TBili  0.3  /  DBili  x   /  AST  39  /  ALT  64<H>  /  AlkPhos  352<H>  08-01        Thyroid Function Tests:  07-06 @ 18:36 TSH 4.69 FreeT4 -- T3 43 Anti TPO -- Anti Thyroglobulin Ab -- TSI --      A1C with Estimated Average Glucose Result: 6.0 % (06-30-22 @ 05:49)      Estimated Average Glucose: 126 mg/dL (06-30-22 @ 05:49)        07-27 Chol -- Direct LDL -- LDL calculated -- HDL -- Trig 118

## 2022-08-01 NOTE — PROGRESS NOTE ADULT - NSPROGADDITIONALINFOA_GEN_ALL_CORE
-Plan discussed with pt/team.  Contact info: [phone] (24/7). pager [phone]  [email or web address] password [email or web address]  Spent 28 minutes assessing pt/labs/meds and discussing plan of care with primary team/ son  Adjusting insulin  Discharge plan  Follow up care

## 2022-08-01 NOTE — PROGRESS NOTE ADULT - SUBJECTIVE AND OBJECTIVE BOX
[facility] DIVISION OF KIDNEY DISEASES AND HYPERTENSION -- FOLLOW UP NOTE  --------------------------------------------------------------------------------  Chief Complaint:    24 hour events/subjective:  Patient was seen and examined at bedside      PAST HISTORY  --------------------------------------------------------------------------------  No significant changes to PMH, PSH, FHx, SHx, unless otherwise noted    ALLERGIES & MEDICATIONS  --------------------------------------------------------------------------------  Allergies    No Known Allergies    Intolerances      Standing Inpatient Medications  carvedilol 25 milliGRAM(s) Oral every 12 hours  chlorhexidine 2% Cloths 1 Application(s) Topical <User Schedule>  dextrose 50% Injectable 50 milliLiter(s) IV Push every 15 minutes  doxazosin 4 milliGRAM(s) Oral <User Schedule>  epoetin cortney-epbx (RETACRIT) Injectable 46428 Unit(s) SubCutaneous every 7 days  ertapenem  IVPB 500 milliGRAM(s) IV Intermittent every 24 hours  everolimus (ZORTRESS) 0.5 milliGRAM(s) Oral every 12 hours  finasteride 5 milliGRAM(s) Oral daily  fluconAZOLE IVPB 200 milliGRAM(s) IV Intermittent every 24 hours  heparin   Injectable 5000 Unit(s) SubCutaneous every 8 hours  insulin glargine Injectable (LANTUS) 5 Unit(s) SubCutaneous at bedtime  insulin regular Infusion 3 Unit(s)/Hr IV Continuous <Continuous>  levothyroxine 50 MICROGram(s) Oral daily  melatonin 6 milliGRAM(s) Oral at bedtime  mycophenolate mofetil 1000 milliGRAM(s) Oral every 12 hours  NIFEdipine XL 60 milliGRAM(s) Oral every 12 hours  pantoprazole    Tablet 40 milliGRAM(s) Oral daily  phenytoin   Capsule 100 milliGRAM(s) Oral every 8 hours  polyethylene glycol 3350 17 Gram(s) Oral daily  predniSONE   Tablet 5 milliGRAM(s) Oral <User Schedule>  senna 2 Tablet(s) Oral at bedtime  sodium bicarbonate 1300 milliGRAM(s) Oral <User Schedule>    PRN Inpatient Medications  acetaminophen     Tablet .. 650 milliGRAM(s) Oral every 6 hours PRN      REVIEW OF SYSTEMS  --------------------------------------------------------------------------------  Gen: No fatigue, fevers/chills, weakness  Skin: No rashes  Head/Eyes/Ears/Mouth: No headache;No sore throat  Respiratory: No dyspnea, cough,   CV: No chest pain, PND, orthopnea  GI: No abdominal pain, diarrhea, constipation, nausea, vomiting  Transplant: No pain  : No increased frequency, dysuria, hematuria, nocturia  MSK: No joint pain/swelling; no back pain; no edema  Neuro: No dizziness/lightheadedness, weakness, seizures, numbness, tingling  Psych: No significant nervousness, anxiety, stress, depression    All other systems were reviewed and are negative, except as noted.    VITALS/PHYSICAL EXAM  --------------------------------------------------------------------------------  T(C): 35.8 (08-01-22 @ 11:00), Max: 36.7 (07-31-22 @ 20:00)  HR: 42 (08-01-22 @ 11:00) (40 - 60)  BP: 123/60 (08-01-22 @ 11:00) (115/56 - 194/83)  RR: 12 (08-01-22 @ 11:00) (11 - 30)  SpO2: 100% (08-01-22 @ 11:00) (92% - 100%)  Wt(kg): --        07-31-22 @ 07:01  -  08-01-22 @ 07:00  --------------------------------------------------------  IN: 934 mL / OUT: 70 mL / NET: 864 mL    08-01-22 @ 07:01  -  08-01-22 @ 11:32  --------------------------------------------------------  IN: 114 mL / OUT: 0 mL / NET: 114 mL      Physical Exam:  	Gen: NAD  	HEENT: PERRL, supple neck, clear oropharynx  	Pulm: CTA B/L  	CV: RRR, S1S2; no rub  	Back: No spinal or CVA tenderness; no sacral edema  	Abd: +BS, soft, nontender/nondistended                      Transplant: No tenderness, swelling  	: No suprapubic tenderness  	UE: Warm, FROM; no edema; no asterixis  	LE: Warm, FROM; no edema  	Neuro: No focal deficits  	Psych: Normal affect and mood  	Skin: Warm, without rashes      LABS/STUDIES  --------------------------------------------------------------------------------              10.5   9.81  >-----------<  444      [08-01-22 @ 05:43]              32.1     134  |  95  |  70  ----------------------------<  232      [08-01-22 @ 05:43]  4.4   |  23  |  3.59        Ca     8.7     [08-01-22 @ 05:43]      Mg     2.4     [08-01-22 @ 05:43]      Phos  4.0     [08-01-22 @ 05:43]    TPro  5.9  /  Alb  2.1  /  TBili  0.3  /  DBili  x   /  AST  39  /  ALT  64  /  AlkPhos  352  [08-01-22 @ 05:43]    PT/INR: PT 14.9 , INR 1.28       [08-01-22 @ 05:43]  PTT: 29.9       [08-01-22 @ 05:43]          [07-30-22 @ 13:02]    Creatinine Trend:  SCr 3.59 [08-01 @ 05:43]  SCr 2.52 [07-30 @ 22:45]  SCr 2.65 [07-29 @ 21:14]  SCr 3.41 [07-28 @ 23:12]  SCr 2.57 [07-28 @ 00:55]        Urinalysis - [07-25-22 @ 18:28]      Color Yellow / Appearance Slightly Turbid / SG 1.020 / pH 5.5      Gluc Negative / Ketone Negative  / Bili Negative / Urobili Negative       Blood Small / Protein 100 / Leuk Est Large / Nitrite Negative      RBC 2 / WBC 40 / Hyaline 5 / Gran  / Sq Epi  / Non Sq Epi 0 / Bacteria Many    Urine Creatinine 129      [07-26-22 @ 04:17]  Urine Sodium 20      [07-26-22 @ 04:17]  Urine Osmolality 387      [07-26-22 @ 04:17]    Iron 17, TIBC 171, %sat 10      [05-02-22 @ 13:03]  Ferritin 6336      [07-27-22 @ 13:00]  PTH -- (Ca 9.2)      [02-05-22 @ 10:13]   294  HbA1c 6.0      [02-21-20 @ 08:53]  TSH 4.69      [07-06-22 @ 18:36]  Lipid: chol --, , HDL --, LDL --      [07-27-22 @ 13:00]    HBsAg Nonreact      [07-25-22 @ 11:51]  HCV 0.18, Nonreact      [07-25-22 @ 11:51]

## 2022-08-01 NOTE — PROGRESS NOTE ADULT - SUBJECTIVE AND OBJECTIVE BOX
[facility]   MD NINA MASON MD, DO SADIE RAMIREZ NP    TEL:  OFFICE: [phone]    From 5pm-7am Answering Service [phone]    -- RENAL FOLLOW UP NOTE ---Date of Service 08-01-22 @ 14:32    Patient is a 66y old  Male who presents with a chief complaint of Status Epilepticus (01 Aug 2022 11:32)      Patient seen and examined in icu.     VITALS:  T(F): 96.4 (08-01-22 @ 11:00), Max: 98 (07-31-22 @ 20:00)  HR: 43 (08-01-22 @ 12:00)  BP: 132/62 (08-01-22 @ 12:00)  RR: 12 (08-01-22 @ 12:00)  SpO2: 99% (08-01-22 @ 12:00)  Wt(kg): --    07-31 @ 07:01  -  08-01 @ 07:00  --------------------------------------------------------  IN: 934 mL / OUT: 70 mL / NET: 864 mL    08-01 @ 07:01  -  08-01 @ 14:32  --------------------------------------------------------  IN: 117 mL / OUT: 0 mL / NET: 117 mL          PHYSICAL EXAM:  Constitutional: NAD  Neck: No JVD  Respiratory: CTAB, no wheezes, rales or rhonchi  Cardiovascular: S1, S2, RRR  Gastrointestinal: BS+, soft, NT/ND  Extremities: + peripheral edema    Hospital Medications:   MEDICATIONS  (STANDING):  belatacept IVPB 625 milliGRAM(s) IV Intermittent once  carvedilol 25 milliGRAM(s) Oral every 12 hours  chlorhexidine 2% Cloths 1 Application(s) Topical <User Schedule>  doxazosin 4 milliGRAM(s) Oral <User Schedule>  epoetin cortney-epbx (RETACRIT) Injectable 32224 Unit(s) SubCutaneous every 7 days  ertapenem  IVPB 500 milliGRAM(s) IV Intermittent every 24 hours  finasteride 5 milliGRAM(s) Oral daily  fluconAZOLE IVPB 200 milliGRAM(s) IV Intermittent every 24 hours  heparin   Injectable 5000 Unit(s) SubCutaneous every 8 hours  insulin glargine Injectable (LANTUS) 10 Unit(s) SubCutaneous at bedtime  insulin lispro (ADMELOG) corrective regimen sliding scale   SubCutaneous three times a day before meals  insulin lispro (ADMELOG) corrective regimen sliding scale   SubCutaneous at bedtime  insulin lispro Injectable (ADMELOG) 7 Unit(s) SubCutaneous three times a day before meals  levothyroxine 50 MICROGram(s) Oral daily  melatonin 6 milliGRAM(s) Oral at bedtime  methylPREDNISolone sodium succinate IVPB 250 milliGRAM(s) IV Intermittent once  mycophenolate mofetil 1000 milliGRAM(s) Oral every 12 hours  NIFEdipine XL 60 milliGRAM(s) Oral every 12 hours  pantoprazole    Tablet 40 milliGRAM(s) Oral daily  phenytoin   Capsule 100 milliGRAM(s) Oral every 8 hours  polyethylene glycol 3350 17 Gram(s) Oral daily  predniSONE   Tablet 5 milliGRAM(s) Oral <User Schedule>  senna 2 Tablet(s) Oral at bedtime  sirolimus 1 milliGRAM(s) Oral once  sodium bicarbonate 1300 milliGRAM(s) Oral <User Schedule>      LABS:  08-01    134<L>  |  95<L>  |  70<H>  ----------------------------<  232<H>  4.4   |  23  |  3.59<H>    Ca    8.7      01 Aug 2022 05:43  Phos  4.0     08-01  Mg     2.4     08-01    TPro  5.9<L>  /  Alb  2.1<L>  /  TBili  0.3  /  DBili      /  AST  39  /  ALT  64<H>  /  AlkPhos  352<H>  08-01    Creatinine Trend: 3.59 <--, 2.52 <--, 2.65 <--, 3.41 <--, 2.57 <--, 2.24 <--, 3.77 <--, 3.34 <--, 3.17 <--, 2.67 <--, 2.45 <--    Albumin, Serum: 2.1 g/dL (08-01 @ 05:43)  Phosphorus Level, Serum: 4.0 mg/dL (08-01 @ 05:43)                              10.5   9.81  )-----------( 444      ( 01 Aug 2022 05:43 )             32.1     Urine Studies:  Urinalysis - [07-25-22 @ 18:28]      Color Yellow / Appearance Slightly Turbid / SG 1.020 / pH 5.5      Gluc Negative / Ketone Negative  / Bili Negative / Urobili Negative       Blood Small / Protein 100 / Leuk Est Large / Nitrite Negative      RBC 2 / WBC 40 / Hyaline 5 / Gran  / Sq Epi  / Non Sq Epi 0 / Bacteria Many    Urine Creatinine 129      [07-26-22 @ 04:17]  Urine Sodium 20      [07-26-22 @ 04:17]  Urine Osmolality 387      [07-26-22 @ 04:17]    Iron 17, TIBC 171, %sat 10      [05-02-22 @ 13:03]  Ferritin 6336      [07-27-22 @ 13:00]  PTH -- (Ca 9.2)      [02-05-22 @ 10:13]   294  HbA1c 6.0      [02-21-20 @ 08:53]  TSH 4.69      [07-06-22 @ 18:36]  Lipid: chol --, , HDL --, LDL --      [07-27-22 @ 13:00]    HBsAg Nonreact      [07-25-22 @ 11:51]  HCV 0.18, Nonreact      [07-25-22 @ 11:51]      RADIOLOGY & ADDITIONAL STUDIES:

## 2022-08-01 NOTE — PROGRESS NOTE ADULT - NS ATTEND AMEND GEN_ALL_CORE FT
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  Not candidate for Thymo considering high infectious risk/frailty.  Immuno: solumedrol/cellcept/sirolimus.

## 2022-08-01 NOTE — PROGRESS NOTE ADULT - ASSESSMENT
66 yr old M with Type 2 DM> unknown control due to skewed A1C 6.6% secondary to chronic anemia and s/p blood tx. On basal/bolus insulin therapy PTA. DM c/b ESRD s/p DDRT on 3/21, CVA, CAD s/p CABG, Afib on apixaban, seizure activity, HTN, HLD, h/o GI bleed in 2/2022 EGD with duodenal ulcer, discharged to [facility] rehab center after prior hospitalization, now re-admitted from [facility] for seizures c/f status epilepticus in setting of UTI. endocrine consulted for steroid induced hyperglycemia, now on home dose prednisone 5mg. Prolonged hospital course w/ ICU stay, previously intubated/extubated, previous seizures. S/p ureteral stent removal 7/12/22 pt is now on high steropid doses due to rejection with anuria and worsening creat. Also on antibiotic for sepsis. BG at goal while on insulin drip and basal insulin.  Tolerating POs but not eating much. Recommending to c/w insulin drip while on high steroid doses. Also can increase basal insulin for now to decrease insulin drip requirements. No recommending basal/bolus therapy at this time since pt is not eatign enough and his hyperglycemia is mostly due to high steroid doses. Will follow to keep BG goal (100-180mg/dl).

## 2022-08-01 NOTE — PROGRESS NOTE ADULT - ASSESSMENT
66 year old male with PMH of  DM type II, HTN, CAD s/p CABG in 2020, Afib on Eliquis, CVA in 2019 due to Afib, Seizure d/o (last episode was on 4/8/22), h/o GI bleed in 2/2022 EGD with duodenal ulcer and ESRD on HD s/p R DDRT from DCD donor on 4/21/22 complicated by DGF requiring HD until 4/29/22, later had good graft function who was readmitted with status epilepticus in setting of UTI/antibiotics and sub-therapeutic valproic acid level.  Transferred to SICU on 7/25 with signs of sepsis. Biopsy from 7/29 demonstrating grade 1a rejection. Started pulse steroids (Solumedrol 500 on 7/30 -> 250 on 7/31).     1. s/p R DDRT from DCD donor on 4/21/22 - Allograft function initially with DGF which later improved but now with Grade 1a rejection (biopsy on 7/29) - s/p pulse dose steroids, last dose today.   2. IS meds- On Belatacept- due today. Started on Sirolimus . MMF was restarted  and continue Prednisone   3. AMS , seizures0 mentation improved significantly . on  Phenytoin 100 q8 for seizure ppx  4. DM - in insulin gtt currently due to uncontrolled BS , likely sec to pulse dose steroids  5. ESBL klebsiella UTI (7/15 + UCx, 7/25 +UCx)- Blood cx has been  negative, F/u Rpt blood cx . On  Ertapenam until 8/3 + Fluconazole

## 2022-08-01 NOTE — ED ADULT TRIAGE NOTE - HEIGHT IN FEET
From: Tonny Haque  To: Rafaela Files  Sent: 7/30/2022 1:24 PM EDT  Subject: Refill [facility]     Hello  If I am going to stay on [place] 20/25mg please call in 90 day at [address]. Thank you. 5

## 2022-08-01 NOTE — PROGRESS NOTE ADULT - ASSESSMENT
65 y/o male w/ a PMHx of CAD s/p CABG, AF on Eliquis c/b CVA c/b seizures, HTN, HLD, DM type II, hypothyroidism, GI bleed due to a duodenal ulcer, and ESRD s/p DDRT 4/21/22 c/b DGF requiring HD & large perinephric hematoma 5/22 s/p evacuation w/ revision of arterial anastomosis who presented in status epilepticus 6/10 from rehab requiring intubation for airway protection with a hospital course c/b large right pleural effusion s/p thoracentesis c/b hemothorax while being anticoagulated & requiring intubation for airway protection s/p chest tube placement w/ CT demonstrating retained hemothorax s/p right VATS, status epilepticus again causing AMS requiring intubation for airway protection again, delirium, dysphagia requiring NGT placement for enteral access, hyperkalemia, and poor glycemic control. Pt transferred to floor 7/10 and readmitted to SICU 7/25 for leukopenia, transaminitis, and ANA.     Neuro:  - Phenytoin 100 q8 for seizure ppx  - Appreciate neuro recs, no need to repeat levels at this time  - Multimodal pain control w/Tylenol  - Melatonin + protected sleep    Resp: no acute issues  - Out of bed to chair, ambulate as tolerated, and incentive spirometry to prevent atelectasis    CVS: HDS  - Nifedipine 60 mg qd  - Coreg 25 mg q12    GI:   - Regular diet, soft and bite sized   - Transaminitis continues to improve, continue holding hydralazine, lipitor, bactrim  - Bowel regimen with Miralax, holding senna due to multiple bowel movements overnight    Renal: DDRT 4/21 cb DGF, now w cf acute rejection, s/p IR renal bx 7/29, path pending   - Crt elevated  - Low uop   - HD last 7/30, PRN per Transplant nephro   - F/u IR renal bx pathology  - Monitor I&Os and electrolytes  - replete electrolytes as needed   - Doxazosin + Finasteride  - IV locked    Heme:  - Monitor CBC and coags  - SCDs  - holding VTE ppx for now pending procedure    ID: ESBL klebsiella UTI (7/15 + UCx, 7/25 +UCx)  - Blood cx negative 7/25, 7/27  - F/u Rpt blood cx   - Febrile w Tmax 38.6 on 7/30  - Monitor WBC, temperature, and procalcitonin  - Erbapenam until 8/3 + Fluconazole  -Immunosuppresion per transplant nephro     Endo: hx of DM, Hypothyroidism  - High dose Solumedrol in setting of possible acute graft rejection   - Monitor glucose of bmp  - Lantus 5, premeal 4, and ISS  - Home Synthroid  - Insulin gtt   65 y/o male w/ a PMHx of CAD s/p CABG, AF on Eliquis c/b CVA c/b seizures, HTN, HLD, DM type II, hypothyroidism, GI bleed due to a duodenal ulcer, and ESRD s/p DDRT 4/21/22 c/b DGF requiring HD & large perinephric hematoma 5/22 s/p evacuation w/ revision of arterial anastomosis who presented in status epilepticus 6/10 from rehab requiring intubation for airway protection with a hospital course c/b large right pleural effusion s/p thoracentesis c/b hemothorax while being anticoagulated & requiring intubation for airway protection s/p chest tube placement w/ CT demonstrating retained hemothorax s/p right VATS, status epilepticus again causing AMS requiring intubation for airway protection again, delirium, dysphagia requiring NGT placement for enteral access, hyperkalemia, and poor glycemic control. Pt transferred to floor 7/10 and readmitted to SICU 7/25 for leukopenia, transaminitis, and ANA.     Neuro:  - Phenytoin 100 q8 for seizure ppx  - Appreciate neuro recs, no need to repeat levels at this time  - Multimodal pain control w/Tylenol  - Melatonin + protected sleep    Resp: no acute issues  - Out of bed to chair, ambulate as tolerated, and incentive spirometry to prevent atelectasis    CVS: HDS  - Nifedipine 60 mg qd  - Coreg 25 mg q12    GI:   - Regular diet, soft and bite sized   - Transaminitis continues to improve, continue holding hydralazine, lipitor, bactrim  - Bowel regimen with Miralax, holding senna due to multiple bowel movements overnight    Renal: DDRT 4/21 cb DGF, now w cf acute rejection, s/p IR renal bx 7/29, path pending   - Crt elevated  - Low uop   - HD last 7/30, PRN per Transplant nephro   - F/u IR renal bx pathology  - Monitor I&Os and electrolytes  - replete electrolytes as needed   - Doxazosin + Finasteride  - IV locked    Heme:  - Monitor CBC and coags  - SCDs  - holding VTE ppx for now pending procedure    ID: ESBL klebsiella UTI (7/15 + UCx, 7/25 +UCx)  - Blood cx negative 7/25, 7/27  - F/u Rpt blood cx   - Febrile w Tmax 38.6 on 7/30  - Monitor WBC, temperature, and procalcitonin  - Erbapenam until 8/3 + Fluconazole  -Immunosuppresion per transplant nephro     Endo: hx of DM, Hypothyroidism  - High dose Solumedrol in setting of possible acute graft rejection   - Monitor glucose of bmp  - Lantus 10, premeal 7, and ISS  - Home Synthroid

## 2022-08-01 NOTE — PROGRESS NOTE ADULT - NUTRITIONAL ASSESSMENT
Diet, Soft and Bite Sized:   Consistent Carbohydrate {Evening Snack} (CSTCHOSN)  For patients receiving Renal Replacement - No Protein Restr, No Conc K, No Conc Phos, Low Sodium (RENAL)  Supplement Feeding Modality:  Oral  Nepro Cans or Servings Per Day:  3       Frequency:  Three Times a day (07-29-22 @ 19:00) [Active]      Please see RD assessment and/or follow up.  Managed by primary team as well

## 2022-08-02 NOTE — PROGRESS NOTE ADULT - NSPROGADDITIONALINFOA_GEN_ALL_CORE
-Plan discussed with pt/team.  Contact info: [phone] (24/7). pager [phone]  [email or web address] password [email or web address]  Spent 30 minutes assessing pt/labs/meds and discussing plan of care with primary team/ son  Adjusting insulin  Discharge plan  Follow up care

## 2022-08-02 NOTE — PROGRESS NOTE ADULT - ASSESSMENT
66M with h/o DM type II, HTN, CAD s/p CABG in 2020, Afib on Eliquis, CVA in 2019 due to Afib, Seizure d/o (last episode was on 4/8/22), h/o GI bleed in 2/2022 EGD with duodenal ulcer and ESRD on HD s/p R DDRT from DCD donor on 4/21/22 complicated by DGF requiring HD until 4/29/22 now with good graft function who presented with status epilepticus in setting of UTI/antibiotics and sub-therapeutic valproic acid level. Transferred to SICU on 7/25 with signs of sepsis. Biopsy from 7/29 demonstrating grade 1a rejection. Started pulse steroids (Solumedrol 500 on 7/30 -> 250 on 7/31). Also febrile on 7/30, but afebrile overnight.         Acute Transaminitis - unchanged, but improved from onset  - Hepatology following   - hepatotoxic medications held  - RUQ abd- Abdominal doppler - no PVT   - CT a/p non contrast- small perinephric collection   - Labs: Hepatitis panel, BK PCR, EBV PCR, CMV with PCR -> negative  - Infectious w/u: bld cxs with NGTD; urine cx with Kleb pneumo   - ID following: on Erta started 7/28 now off miguel   - FU with ID for duration of ertapenam  - On  empiric fluc   - Check IgG level -> may benefit from IVIG given ongoing fever despite broad-spectrum antibiotic coverage     R DCD DDRT 4/21/22 now with ANA  - S/P removal of ureteral stent on 7/12  - s/p HD 7/27, 7/29  - s/p IR Biopsy (7/29) - grade 2 a rejection -> Solumedrol 500 BID (7/30), 250mg (7/31&8/1), Solu 125 mg x1 today,  cellcept 1gm bid    - Continue Doxazosin/Proscar for BPH  - OOB with RN/PT  - Leukopenia: valcyte  - F/u results of DSA   - Renal US today  - Bladder scan       Immunosuppression:   - Belatacept: 6/23, 7/4, 7/8. Re-loaded 7/8,  as there was a gap between doses 2/2 seizures. 7/18,  last dose 8/1  - continue Pred 5  - PPx: Valcyte/ bactrim on hold.    Seizure Disorder:  - MRI head 6/27 with less concerns for PRES, likely ischemic changes  - Remains seizure free  - Antiepileptic regimen per Neurology, on Phenytoin 100mg TID, no further adjustment required  - Keep Mag > 2    DM  - now on insulin gtt   - Endocrine following     AFib  RIJ DVT   - Eliquis with ~40% less efficacy while on fosphenytoin.    - Lovenox held for ANA and transaminitis   - rate controlled    HTN:  - Nifedipine/Cardura/Coreg.   - dced Hydralazine (in setting of transaminitis )

## 2022-08-02 NOTE — PROGRESS NOTE ADULT - SUBJECTIVE AND OBJECTIVE BOX
Follow Up:      Interval History:    REVIEW OF SYSTEMS  [  ] ROS unobtainable because:    [  ] All other systems negative except as noted below    Constitutional:  [ ] fever [ ] chills  [ ] weight loss  [ ] weakness  Skin:  [ ] rash [ ] phlebitis	  Eyes: [ ] icterus [ ] pain  [ ] discharge	  ENMT: [ ] sore throat  [ ] thrush [ ] ulcers [ ] exudates  Respiratory: [ ] dyspnea [ ] hemoptysis [ ] cough [ ] sputum	  Cardiovascular:  [ ] chest pain [ ] palpitations [ ] edema	  Gastrointestinal:  [ ] nausea [ ] vomiting [ ] diarrhea [ ] constipation [ ] pain	  Genitourinary:  [ ] dysuria [ ] frequency [ ] hematuria [ ] discharge [ ] flank pain  [ ] incontinence  Musculoskeletal:  [ ] myalgias [ ] arthralgias [ ] arthritis  [ ] back pain  Neurological:  [ ] headache [ ] seizures  [ ] confusion/altered mental status    Allergies  No Known Allergies        ANTIMICROBIALS:  ertapenem  IVPB 500 every 24 hours  fluconAZOLE IVPB 200 every 24 hours      OTHER MEDS:  MEDICATIONS  (STANDING):  acetaminophen     Tablet .. 650 every 6 hours PRN  doxazosin 4 <User Schedule>  epoetin cortney-epbx (RETACRIT) Injectable 49620 every 7 days  finasteride 5 daily  heparin   Injectable 5000 every 8 hours  hydrALAZINE 25 every 8 hours  insulin glargine Injectable (LANTUS) 12 at bedtime  insulin regular Infusion 3 <Continuous>  levothyroxine 50 daily  melatonin 6 at bedtime  mycophenolate mofetil 1000 every 12 hours  NIFEdipine XL 60 every 12 hours  pantoprazole    Tablet 40 daily  phenytoin   Capsule 100 every 8 hours  polyethylene glycol 3350 17 daily  predniSONE   Tablet 5 <User Schedule>  senna 2 at bedtime  sirolimus 1 <User Schedule>      Vital Signs Last 24 Hrs  T(C): 36.6 (02 Aug 2022 16:00), Max: 36.6 (02 Aug 2022 16:00)  T(F): 97.8 (02 Aug 2022 16:00), Max: 97.8 (02 Aug 2022 16:00)  HR: 44 (02 Aug 2022 18:00) (43 - 51)  BP: 111/77 (02 Aug 2022 18:00) (111/77 - 189/91)  BP(mean): 89 (02 Aug 2022 18:00) (81 - 130)  RR: 13 (02 Aug 2022 18:00) (11 - 21)  SpO2: 99% (02 Aug 2022 18:00) (95% - 100%)    Parameters below as of 02 Aug 2022 18:00  Patient On (Oxygen Delivery Method): room air        PHYSICAL EXAMINATION:  General: Alert and Awake, NAD  HEENT: PERRL, EOMI  Neck: Supple  Cardiac: RRR, No M/R/G  Resp: CTAB, No Wh/Rh/Ra  Abdomen: NBS, NT/ND, No HSM, No rigidity or guarding  MSK: No LE edema. No Calf tenderness  : No tucker  Skin: No rashes or lesions. Skin is warm and dry to the touch.   Neuro: Alert and Awake. CN 2-12 Grossly intact. Moves all four extremities spontaneously.  Psych: Calm, Pleasant, Cooperative                          11.2   12.03 )-----------( 455      ( 01 Aug 2022 23:41 )             34.2       08-01    132<L>  |  93<L>  |  77<H>  ----------------------------<  206<H>  5.2   |  25  |  3.69<H>    Ca    8.3<L>      01 Aug 2022 23:41  Phos  4.3     08-01  Mg     2.4     08-01    TPro  5.6<L>  /  Alb  1.9<L>  /  TBili  0.3  /  DBili  x   /  AST  44<H>  /  ALT  53<H>  /  AlkPhos  316<H>  08-01          MICROBIOLOGY:  v  .Blood Blood-Peripheral  07-30-22   No growth to date.  --  --      .Blood Blood-Peripheral  07-30-22   No growth to date.  --  --      .Blood Blood  07-27-22   Testing in progress  --  --      Catheterized Catheterized  07-25-22   50,000 - 99,000 CFU/mL Klebsiella pneumoniae ESBL  --  Klebsiella pneumoniae ESBL      .Blood Blood  07-25-22   No Growth Final  --  --      .Blood Blood  07-25-22   No Growth Final  --  --      .Blood Blood  07-15-22   No Growth Final  --  --      .Blood Blood  07-15-22   No Growth Final  --  --      Clean Catch Clean Catch (Midstream)  07-13-22   50,000 - 99,000 CFU/mL Klebsiella pneumoniae ESBL  --  Klebsiella pneumoniae ESBL                RADIOLOGY:    <The imaging below has been reviewed and visualized by me independently. Findings as detailed in report below> Follow Up:  transplant pyelo     Interval History: afebrile. no acute events.     REVIEW OF SYSTEMS  [  ] ROS unobtainable because:    [ x ] All other systems negative except as noted below    Constitutional:  [ ] fever [ ] chills  [ ] weight loss  [ ] weakness  Skin:  [ ] rash [ ] phlebitis	  Eyes: [ ] icterus [ ] pain  [ ] discharge	  ENMT: [ ] sore throat  [ ] thrush [ ] ulcers [ ] exudates  Respiratory: [ ] dyspnea [ ] hemoptysis [ ] cough [ ] sputum	  Cardiovascular:  [ ] chest pain [ ] palpitations [ ] edema	  Gastrointestinal:  [ ] nausea [ ] vomiting [ ] diarrhea [ ] constipation [ ] pain	  Genitourinary:  [ ] dysuria [ ] frequency [ ] hematuria [ ] discharge [ ] flank pain  [ ] incontinence  Musculoskeletal:  [ ] myalgias [ ] arthralgias [ ] arthritis  [ ] back pain  Neurological:  [ ] headache [ ] seizures  [ ] confusion/altered mental status    Allergies  No Known Allergies        ANTIMICROBIALS:  ertapenem  IVPB 500 every 24 hours  fluconAZOLE IVPB 200 every 24 hours      OTHER MEDS:  MEDICATIONS  (STANDING):  acetaminophen     Tablet .. 650 every 6 hours PRN  doxazosin 4 <User Schedule>  epoetin cortney-epbx (RETACRIT) Injectable 34812 every 7 days  finasteride 5 daily  heparin   Injectable 5000 every 8 hours  hydrALAZINE 25 every 8 hours  insulin glargine Injectable (LANTUS) 12 at bedtime  insulin regular Infusion 3 <Continuous>  levothyroxine 50 daily  melatonin 6 at bedtime  mycophenolate mofetil 1000 every 12 hours  NIFEdipine XL 60 every 12 hours  pantoprazole    Tablet 40 daily  phenytoin   Capsule 100 every 8 hours  polyethylene glycol 3350 17 daily  predniSONE   Tablet 5 <User Schedule>  senna 2 at bedtime  sirolimus 1 <User Schedule>      Vital Signs Last 24 Hrs  T(C): 36.6 (02 Aug 2022 16:00), Max: 36.6 (02 Aug 2022 16:00)  T(F): 97.8 (02 Aug 2022 16:00), Max: 97.8 (02 Aug 2022 16:00)  HR: 44 (02 Aug 2022 18:00) (43 - 51)  BP: 111/77 (02 Aug 2022 18:00) (111/77 - 189/91)  BP(mean): 89 (02 Aug 2022 18:00) (81 - 130)  RR: 13 (02 Aug 2022 18:00) (11 - 21)  SpO2: 99% (02 Aug 2022 18:00) (95% - 100%)    Parameters below as of 02 Aug 2022 18:00  Patient On (Oxygen Delivery Method): room air        PHYSICAL EXAMINATION:  General: Alert and Awake, NAD  Cardiac: RRR, No M/R/G  Resp: CTAB, No Wh/Rh/Ra  Abdomen: NBS, NT/ND, No HSM, No rigidity or guarding  MSK: No LE edema. No Calf tenderness  Skin: No rashes or lesions. Skin is warm and dry to the touch.   Neuro: Alert and Awake. CN 2-12 Grossly intact. Moves all four extremities spontaneously.  Psych: Calm, Pleasant, Cooperative                          11.2   12.03 )-----------( 455      ( 01 Aug 2022 23:41 )             34.2       08-01    132<L>  |  93<L>  |  77<H>  ----------------------------<  206<H>  5.2   |  25  |  3.69<H>    Ca    8.3<L>      01 Aug 2022 23:41  Phos  4.3     08-01  Mg     2.4     08-01    TPro  5.6<L>  /  Alb  1.9<L>  /  TBili  0.3  /  DBili  x   /  AST  44<H>  /  ALT  53<H>  /  AlkPhos  316<H>  08-01          MICROBIOLOGY:  v  .Blood Blood-Peripheral  07-30-22   No growth to date.  --  --      .Blood Blood-Peripheral  07-30-22   No growth to date.  --  --      .Blood Blood  07-27-22   Testing in progress  --  --      Catheterized Catheterized  07-25-22   50,000 - 99,000 CFU/mL Klebsiella pneumoniae ESBL  --  Klebsiella pneumoniae ESBL      .Blood Blood  07-25-22   No Growth Final  --  --      .Blood Blood  07-25-22   No Growth Final  --  --      .Blood Blood  07-15-22   No Growth Final  --  --      .Blood Blood  07-15-22   No Growth Final  --  --      Clean Catch Clean Catch (Midstream)  07-13-22   50,000 - 99,000 CFU/mL Klebsiella pneumoniae ESBL  --  Klebsiella pneumoniae ESBL                RADIOLOGY:    <The imaging below has been reviewed and visualized by me independently. Findings as detailed in report below>    < from: US Trans Kidney w/ Doppler, Right (08.02.22 @ 12:32) >  IMPRESSION:  No hydronephrosis.  Patent vasculature.  Persistently elevated resistive indices.  Redemonstration of urothelial thickening of the collecting system.    < end of copied text >

## 2022-08-02 NOTE — PROGRESS NOTE ADULT - SUBJECTIVE AND OBJECTIVE BOX
[facility] DIVISION OF KIDNEY DISEASES AND HYPERTENSION -- FOLLOW UP NOTE  --------------------------------------------------------------------------------  Chief Complaint:    24 hour events/subjective:  Patient was seen and examined at bedside  denies any complaints     PAST HISTORY  --------------------------------------------------------------------------------  No significant changes to PMH, PSH, FHx, SHx, unless otherwise noted    ALLERGIES & MEDICATIONS  --------------------------------------------------------------------------------  Allergies    No Known Allergies    Intolerances      Standing Inpatient Medications  chlorhexidine 2% Cloths 1 Application(s) Topical <User Schedule>  doxazosin 4 milliGRAM(s) Oral <User Schedule>  epoetin cortney-epbx (RETACRIT) Injectable 34821 Unit(s) SubCutaneous every 7 days  ertapenem  IVPB 500 milliGRAM(s) IV Intermittent every 24 hours  finasteride 5 milliGRAM(s) Oral daily  fluconAZOLE IVPB 200 milliGRAM(s) IV Intermittent every 24 hours  heparin   Injectable 5000 Unit(s) SubCutaneous every 8 hours  hydrALAZINE 25 milliGRAM(s) Oral every 8 hours  insulin glargine Injectable (LANTUS) 10 Unit(s) SubCutaneous at bedtime  insulin lispro (ADMELOG) corrective regimen sliding scale   SubCutaneous three times a day before meals  insulin lispro (ADMELOG) corrective regimen sliding scale   SubCutaneous at bedtime  insulin lispro Injectable (ADMELOG) 6 Unit(s) SubCutaneous three times a day before meals  levothyroxine 50 MICROGram(s) Oral daily  melatonin 6 milliGRAM(s) Oral at bedtime  methylPREDNISolone sodium succinate Injectable 125 milliGRAM(s) IV Push once  mycophenolate mofetil 1000 milliGRAM(s) Oral every 12 hours  NIFEdipine XL 60 milliGRAM(s) Oral every 12 hours  pantoprazole    Tablet 40 milliGRAM(s) Oral daily  phenytoin   Capsule 100 milliGRAM(s) Oral every 8 hours  polyethylene glycol 3350 17 Gram(s) Oral daily  predniSONE   Tablet 5 milliGRAM(s) Oral <User Schedule>  senna 2 Tablet(s) Oral at bedtime  sirolimus 1 milliGRAM(s) Oral <User Schedule>  sodium bicarbonate 1300 milliGRAM(s) Oral <User Schedule>    PRN Inpatient Medications  acetaminophen     Tablet .. 650 milliGRAM(s) Oral every 6 hours PRN      REVIEW OF SYSTEMS  --------------------------------------------------------------------------------  Gen: No fatigue, fevers/chills, weakness  Skin: No rashes  Head/Eyes/Ears/Mouth: No headache;No sore throat  Respiratory: No dyspnea, cough,   CV: No chest pain, PND, orthopnea  GI: No abdominal pain, diarrhea, constipation, nausea, vomiting  Transplant: No pain  : No increased frequency, dysuria, hematuria, nocturia  MSK: No joint pain/swelling; no back pain; no edema  Neuro: No dizziness/lightheadedness, weakness, seizures, numbness, tingling  Psych: No significant nervousness, anxiety, stress, depression    All other systems were reviewed and are negative, except as noted.    VITALS/PHYSICAL EXAM  --------------------------------------------------------------------------------  T(C): 36.2 (08-02-22 @ 05:00), Max: 36.2 (08-01-22 @ 19:00)  HR: 45 (08-02-22 @ 11:00) (41 - 51)  BP: 117/56 (08-02-22 @ 11:00) (117/56 - 186/82)  RR: 16 (08-02-22 @ 11:00) (11 - 22)  SpO2: 99% (08-02-22 @ 11:00) (96% - 100%)  Wt(kg): --        08-01-22 @ 07:01  -  08-02-22 @ 07:00  --------------------------------------------------------  IN: 791 mL / OUT: 0 mL / NET: 791 mL    08-02-22 @ 07:01  -  08-02-22 @ 12:07  --------------------------------------------------------  IN: 200 mL / OUT: 0 mL / NET: 200 mL      Physical Exam:  	Gen: NAD  	HEENT: PERRL, supple neck, clear oropharynx  	Pulm: CTA B/L  	CV: RRR, S1S2; no rub  	Back: No spinal or CVA tenderness; no sacral edema  	Abd: +BS, soft, nontender/nondistended                      Transplant: No tenderness, swelling  	: No suprapubic tenderness  	UE: Warm, FROM; no edema; no asterixis  	LE: Warm, FROM; no edema  	Neuro: No focal deficits  	Psych: Normal affect and mood  	Skin: Warm, without rashes      LABS/STUDIES  --------------------------------------------------------------------------------              11.2   12.03 >-----------<  455      [08-01-22 @ 23:41]              34.2     132  |  93  |  77  ----------------------------<  206      [08-01-22 @ 23:41]  5.2   |  25  |  3.69        Ca     8.3     [08-01-22 @ 23:41]      Mg     2.4     [08-01-22 @ 23:41]      Phos  4.3     [08-01-22 @ 23:41]    TPro  5.6  /  Alb  1.9  /  TBili  0.3  /  DBili  x   /  AST  44  /  ALT  53  /  AlkPhos  316  [08-01-22 @ 23:41]    PT/INR: PT 14.2 , INR 1.22       [08-01-22 @ 23:41]  PTT: 31.4       [08-01-22 @ 23:41]      Creatinine Trend:  SCr 3.69 [08-01 @ 23:41]  SCr 3.59 [08-01 @ 05:43]  SCr 2.52 [07-30 @ 22:45]  SCr 2.65 [07-29 @ 21:14]  SCr 3.41 [07-28 @ 23:12]        Sirolimus (Rapamycin) Level, Serum: 6.7 ng/mL (08-01 @ 23:41)      Urinalysis - [07-25-22 @ 18:28]      Color Yellow / Appearance Slightly Turbid / SG 1.020 / pH 5.5      Gluc Negative / Ketone Negative  / Bili Negative / Urobili Negative       Blood Small / Protein 100 / Leuk Est Large / Nitrite Negative      RBC 2 / WBC 40 / Hyaline 5 / Gran  / Sq Epi  / Non Sq Epi 0 / Bacteria Many      Iron 17, TIBC 171, %sat 10      [05-02-22 @ 13:03]  Ferritin 6336      [07-27-22 @ 13:00]  PTH -- (Ca 9.2)      [02-05-22 @ 10:13]   294  HbA1c 6.0      [02-21-20 @ 08:53]  TSH 4.69      [07-06-22 @ 18:36]  Lipid: chol --, , HDL --, LDL --      [07-27-22 @ 13:00]    HBsAg Nonreact      [07-25-22 @ 11:51]  HCV 0.18, Nonreact      [07-25-22 @ 11:51]

## 2022-08-02 NOTE — PROGRESS NOTE ADULT - ASSESSMENT
66 yr old Male with PMHx ESRD s/p permacath removal 5/10/22 s/p Rt DDRT 4/21/22,  CVA (2019), Afib on apixaban, seizure activity, CAD s/p stents, CABG (2020), HTN, HLD, DM on insulin, gastric/duodenal ulcer, recent hospitalization 4/28/22 -5/10/22 with weakness/anemia found to have perinephric hematoma requiring evacuation and repair of bleeding arterial anastomosis. Curently being treated for UTI with Levaquin Today after developing multiple sz episodes refractory to 5 mg versed IM (EMS) and 2 mg ativan IV in E.D. concerning for status epilepticus requiring intubation for hypoxic respiratory  failure. MICU Consult was called for hypoxic respiratory failure secondary to status epilepticus.  Nephrology consulted for renal failure.     A/P  DDRT on 04/21/22  Course complicated by DGF, was on HD until 4/29/22. Readmitted in May for anemia in the setting of large perinephric hematoma s/p evacuation and repair of arterial anastomosis on 5/02/22. Intra operative biopsy showed no rejection.  ANA was initially sec to  hemodynamic change   stent removal 07/12/22  scr remains relatively stable   No hydronephrosis noted on renal US. UA done on 7/25/22 showed LE and pyuria.   allograft US 07/28/22 shows Interval increase in the resistive indices and peak systolic velocities   compared to prior study performed yesterday. Short-term imaging follow-up   if clinically indicated.  Grade 1a rejection (biopsy on 7/29) - s/p pulse dose steroids  continue Immunosuppression per transplant team  last HD 07/30/22  HD per transplant team   transplant team on board   allograft US, DSA negative  bladder distended - suggests to get bladder scan    monitor BMP, U/O     HTN   optimal   manage by transplant team    monitor BP closely

## 2022-08-02 NOTE — PROGRESS NOTE ADULT - SUBJECTIVE AND OBJECTIVE BOX
Transplant Surgery - Multidisciplinary Rounds  --------------------------------------------------------------  DDRT    Date: 4/21/2022             Present:  Patient seen with multidisciplinary team including Transplant Surgeon: Dr. David, PGY 3 Dr. Gonzalez . Transplant Nephrologist: Dr. Kindra ISBELL, nephrology fellow, Transplant Pharmacist: Bryan Lim, ACp's: Temi Styles in am rounds and examined with Dr. David. Disciplines not in attendance will be notified of the plan.     66M with PMH: DM type II, HTN, CAD s/p CABG in 2020, AFib on Eliquis, CVA in 2019 due to Afib, Seizure d/o following CVA, last episode was on 4/8/22, h/o GIB in 2/2022 EGD with duodenal ulcer.  ESRD due to DM was on HD since 2019.     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  Donor was 58 , KDPI 82%, DCD, single vessels and ureter, HLA mismatch 1, 2, 2. No DSA, cPRA 0%. 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 Re-admitted in May for anemia in the setting of large perinephric hematoma s/p evacuation and repair of arterial anastomosis on 5/2/22. Intra operative biopsy showed no rejection, Creatinine ranging ~2mg/dL.    In Rehab:  He was recently dx with klebsiella UTI rx with ertapenem - then switched to Levaquin day #6.    He also had parainfluenza pneumonia. Was at rehab progressing well.      Hospital course:  - 6/10: transferred from rehab facility for seizure status epilepticus. Intubated for respiratory protection and transferred to MICU.  EEG showed no seizure activity. Neuro consulted.   - 6/11: Extubated. Found to have R pleural effusion. s/p Thoracentesis 1.3L. Eliquis changed to heparin drip.  Post procedure drop in H&H. 5u PRBC, 2 u FFP.   - 6/11 evening: Transferred to SICU from MICU for further care in setting of hypoxia, significant R pleural effusion, anemia and hemodynamic instability  - 6/11 Intubated at 9pm and sedated on Precedex.  CT placed, 600ml blood drained.  1L total overnight, started pressors  - 6/11 Received Protamine for PTT >100 (was on Heparin drip started for AF), 2 u FFP and 5u PRBC total  - 6/13 s/p VATS 2.2L old blood removed; second chest tube placed  - 6/18-19: chest tubes removed  - 6/21: ?PRES vs. chronic ischemic changes on MRI, off Envarsus, switched to Belatacept 6/23 with good graft function  - 6/25: Tx to SICU for refractory seizures, improved with IV antiepileptic regimen  - 7/10: Passed bedside S&S in SICU. Downgraded from SICU to floor.   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 8/1   - Started on sirolomus 1mg yesterday, MMF held (was on 500 BID leukopenia), Pred 5mg daily   - Ongoing monitoring for signs of rejection.    Potential Discharge date: pending clinical improvement.     Education:  Medications    Plan of care:  See Below       MEDICATIONS  (STANDING):  chlorhexidine 2% Cloths 1 Application(s) Topical <User Schedule>  doxazosin 4 milliGRAM(s) Oral <User Schedule>  epoetin cortney-epbx (RETACRIT) Injectable 59945 Unit(s) SubCutaneous every 7 days  ertapenem  IVPB 500 milliGRAM(s) IV Intermittent every 24 hours  finasteride 5 milliGRAM(s) Oral daily  fluconAZOLE IVPB 200 milliGRAM(s) IV Intermittent every 24 hours  heparin   Injectable 5000 Unit(s) SubCutaneous every 8 hours  hydrALAZINE 25 milliGRAM(s) Oral every 8 hours  insulin glargine Injectable (LANTUS) 10 Unit(s) SubCutaneous at bedtime  insulin lispro (ADMELOG) corrective regimen sliding scale   SubCutaneous three times a day before meals  insulin lispro (ADMELOG) corrective regimen sliding scale   SubCutaneous at bedtime  insulin lispro Injectable (ADMELOG) 6 Unit(s) SubCutaneous three times a day before meals  levothyroxine 50 MICROGram(s) Oral daily  melatonin 6 milliGRAM(s) Oral at bedtime  mycophenolate mofetil 1000 milliGRAM(s) Oral every 12 hours  NIFEdipine XL 60 milliGRAM(s) Oral every 12 hours  pantoprazole    Tablet 40 milliGRAM(s) Oral daily  phenytoin   Capsule 100 milliGRAM(s) Oral every 8 hours  polyethylene glycol 3350 17 Gram(s) Oral daily  predniSONE   Tablet 5 milliGRAM(s) Oral <User Schedule>  senna 2 Tablet(s) Oral at bedtime  sirolimus 1 milliGRAM(s) Oral <User Schedule>  sodium bicarbonate 1300 milliGRAM(s) Oral <User Schedule>    MEDICATIONS  (PRN):  acetaminophen     Tablet .. 650 milliGRAM(s) Oral every 6 hours PRN Mild Pain (1 - 3)      PAST MEDICAL & SURGICAL HISTORY:  Hypertension      Diabetes      Dyslipidemia      CAD (Coronary Artery Disease)  with Stents in 06/2009 and 6/2019, s/p off-pump C3L on 8/13/20      Hypothyroidism      CVA (cerebral vascular accident)  12/13/19 with residual bilateral weakness      Anemia      PEG (percutaneous endoscopic gastrostomy) status  removed July 2020      Intubation of airway performed without difficulty  Dec 2019  CVA c/b status epilepticus requiring intubation and PEG in 12/2019-      ESRD on dialysis  M/W/F      Seizures  after CVA, last seizure was last week 4/07/22      History of insertion of stent into coronary artery bypass graft  2009 and 2019      S/P CABG x 3  off pump C3L on 8/13/20          Vital Signs Last 24 Hrs  T(C): 36.2 (02 Aug 2022 05:00), Max: 36.2 (01 Aug 2022 19:00)  T(F): 97.2 (02 Aug 2022 05:00), Max: 97.2 (01 Aug 2022 19:00)  HR: 45 (02 Aug 2022 11:00) (41 - 51)  BP: 117/56 (02 Aug 2022 11:00) (117/56 - 186/82)  BP(mean): 81 (02 Aug 2022 11:00) (81 - 127)  RR: 16 (02 Aug 2022 11:00) (11 - 22)  SpO2: 99% (02 Aug 2022 11:00) (96% - 100%)    Parameters below as of 02 Aug 2022 10:00  Patient On (Oxygen Delivery Method): room air        I&O's Summary    01 Aug 2022 07:01  -  02 Aug 2022 07:00  --------------------------------------------------------  IN: 791 mL / OUT: 0 mL / NET: 791 mL    02 Aug 2022 07:01  -  02 Aug 2022 13:18  --------------------------------------------------------  IN: 200 mL / OUT: 0 mL / NET: 200 mL                              11.2   12.03 )-----------( 455      ( 01 Aug 2022 23:41 )             34.2     08-01    132<L>  |  93<L>  |  77<H>  ----------------------------<  206<H>  5.2   |  25  |  3.69<H>    Ca    8.3<L>      01 Aug 2022 23:41  Phos  4.3     08-01  Mg     2.4     08-01    TPro  5.6<L>  /  Alb  1.9<L>  /  TBili  0.3  /  DBili  x   /  AST  44<H>  /  ALT  53<H>  /  AlkPhos  316<H>  08-01          Culture - Blood (collected 07-30-22 @ 06:19)  Source: .Blood Blood-Peripheral  Preliminary Report (07-31-22 @ 11:01):    No growth to date.    Culture - Blood (collected 07-30-22 @ 06:07)  Source: .Blood Blood-Peripheral  Preliminary Report (07-31-22 @ 11:01):    No growth to date.    Culture - Blood (collected 07-27-22 @ 04:34)  Source: .Blood Blood-Peripheral  Final Report (08-01-22 @ 07:01):    No Growth Final    Culture - Blood (collected 07-27-22 @ 04:34)  Source: .Blood Blood-Peripheral  Final Report (08-01-22 @ 07:01):    No Growth Final    Culture - Fungal, Blood (collected 07-27-22 @ 04:34)  Source: .Blood Blood  Preliminary Report (07-28-22 @ 10:47):    Testing in progress           REVIEW OF SYSTEMS  --------------------------------------------------------------------------------  Gen: No weight changes, fatigue, fevers/chills, weakness  Skin: No rashes  Head/Eyes/Ears/Mouth: No headache; Normal hearing; Normal vision w/o blurriness; No sinus pain/discomfort, sore throat  Respiratory: No dyspnea, cough, wheezing, hemoptysis  CV: No chest pain, PND, orthopnea  GI: No abdominal pain, diarrhea, constipation, nausea, vomiting, melena, hematochezia  : No increased frequency, dysuria, hematuria, nocturia  MSK: No joint pain/swelling; no back pain; no edema  Neuro: No dizziness/lightheadedness, weakness, seizures, numbness, tingling  Heme: No easy bruising or bleeding  Endo: No heat/cold intolerance  Psych: No significant nervousness, anxiety, stress, depression  All other systems were reviewed and are negative, except as noted.        PHYSICAL EXAM:  Constitutional: awake, reduced alertness  Eyes: Anicteric  ENMT: nc/at  Respiratory: not tachypneic, non-labored on RA    Cardiovascular: normotensive, regular rate on monitor   Gastrointestinal: Soft, non distended, nontender, erythema at RLQ incision site with associated warmth, no fluctuance or associated lesions   Genitourinary: anuric on HD   Extremities: SCD's in place and working bilaterally  Vascular: Palpable dp pulses bilaterally  Neurological: oriented to year, place, and self; answering simple questions, but slowly   Skin: no rashes, ulcerations or lesions  Musculoskeletal: Moving all extremities  Psychiatric: Responsive

## 2022-08-02 NOTE — PROGRESS NOTE ADULT - SUBJECTIVE AND OBJECTIVE BOX
[facility]   MD NINA MASON MD, PA KRISTINE SOLTANPOUR, DO INJUNG KO, NP    TEL:  OFFICE: [phone]    From 5pm-7am Answering Service [phone]    -- RENAL FOLLOW UP NOTE ---Date of Service 08-02-22 @ 12:35    Patient is a 66y old  Male who presents with a chief complaint of Status Epilepticus (02 Aug 2022 12:07)      Patient seen and examined in ICU.     VITALS:  T(F): 97.2 (08-02-22 @ 05:00), Max: 97.2 (08-01-22 @ 19:00)  HR: 45 (08-02-22 @ 11:00)  BP: 117/56 (08-02-22 @ 11:00)  RR: 16 (08-02-22 @ 11:00)  SpO2: 99% (08-02-22 @ 11:00)  Wt(kg): --    08-01 @ 07:01  -  08-02 @ 07:00  --------------------------------------------------------  IN: 791 mL / OUT: 0 mL / NET: 791 mL    08-02 @ 07:01  -  08-02 @ 12:35  --------------------------------------------------------  IN: 200 mL / OUT: 0 mL / NET: 200 mL      PHYSICAL EXAM:  Constitutional: NAD  Neck: No JVD  Respiratory: CTAB, no wheezes, rales or rhonchi  Cardiovascular: S1, S2, RRR  Gastrointestinal: distended   Extremities: + peripheral edema    Hospital Medications:   MEDICATIONS  (STANDING):  chlorhexidine 2% Cloths 1 Application(s) Topical <User Schedule>  doxazosin 4 milliGRAM(s) Oral <User Schedule>  epoetin cortney-epbx (RETACRIT) Injectable 02991 Unit(s) SubCutaneous every 7 days  ertapenem  IVPB 500 milliGRAM(s) IV Intermittent every 24 hours  finasteride 5 milliGRAM(s) Oral daily  fluconAZOLE IVPB 200 milliGRAM(s) IV Intermittent every 24 hours  heparin   Injectable 5000 Unit(s) SubCutaneous every 8 hours  hydrALAZINE 25 milliGRAM(s) Oral every 8 hours  insulin glargine Injectable (LANTUS) 10 Unit(s) SubCutaneous at bedtime  insulin lispro (ADMELOG) corrective regimen sliding scale   SubCutaneous three times a day before meals  insulin lispro (ADMELOG) corrective regimen sliding scale   SubCutaneous at bedtime  insulin lispro Injectable (ADMELOG) 6 Unit(s) SubCutaneous three times a day before meals  levothyroxine 50 MICROGram(s) Oral daily  melatonin 6 milliGRAM(s) Oral at bedtime  mycophenolate mofetil 1000 milliGRAM(s) Oral every 12 hours  NIFEdipine XL 60 milliGRAM(s) Oral every 12 hours  pantoprazole    Tablet 40 milliGRAM(s) Oral daily  phenytoin   Capsule 100 milliGRAM(s) Oral every 8 hours  polyethylene glycol 3350 17 Gram(s) Oral daily  predniSONE   Tablet 5 milliGRAM(s) Oral <User Schedule>  senna 2 Tablet(s) Oral at bedtime  sirolimus 1 milliGRAM(s) Oral <User Schedule>  sodium bicarbonate 1300 milliGRAM(s) Oral <User Schedule>      LABS:  08-01    132<L>  |  93<L>  |  77<H>  ----------------------------<  206<H>  5.2   |  25  |  3.69<H>    Ca    8.3<L>      01 Aug 2022 23:41  Phos  4.3     08-01  Mg     2.4     08-01    TPro  5.6<L>  /  Alb  1.9<L>  /  TBili  0.3  /  DBili      /  AST  44<H>  /  ALT  53<H>  /  AlkPhos  316<H>  08-01    Creatinine Trend: 3.69 <--, 3.59 <--, 2.52 <--, 2.65 <--, 3.41 <--, 2.57 <--, 2.24 <--, 3.77 <--, 3.34 <--, 3.17 <--    Albumin, Serum: 1.9 g/dL (08-01 @ 23:41)  Phosphorus Level, Serum: 4.3 mg/dL (08-01 @ 23:41)                              11.2   12.03 )-----------( 455      ( 01 Aug 2022 23:41 )             34.2     Urine Studies:  Urinalysis - [07-25-22 @ 18:28]      Color Yellow / Appearance Slightly Turbid / SG 1.020 / pH 5.5      Gluc Negative / Ketone Negative  / Bili Negative / Urobili Negative       Blood Small / Protein 100 / Leuk Est Large / Nitrite Negative      RBC 2 / WBC 40 / Hyaline 5 / Gran  / Sq Epi  / Non Sq Epi 0 / Bacteria Many      Iron 17, TIBC 171, %sat 10      [05-02-22 @ 13:03]  Ferritin 6336      [07-27-22 @ 13:00]  PTH -- (Ca 9.2)      [02-05-22 @ 10:13]   294  HbA1c 6.0      [02-21-20 @ 08:53]  TSH 4.69      [07-06-22 @ 18:36]  Lipid: chol --, , HDL --, LDL --      [07-27-22 @ 13:00]    HBsAg Nonreact      [07-25-22 @ 11:51]  HCV 0.18, Nonreact      [07-25-22 @ 11:51]      RADIOLOGY & ADDITIONAL STUDIES:

## 2022-08-02 NOTE — PROGRESS NOTE ADULT - ASSESSMENT
66 year old male with PMH of  DM type II, HTN, CAD s/p CABG in 2020, Afib on Eliquis, CVA in 2019 due to Afib, Seizure d/o (last episode was on 4/8/22), h/o GI bleed in 2/2022 EGD with duodenal ulcer and ESRD on HD s/p R DDRT from DCD donor on 4/21/22 complicated by DGF requiring HD until 4/29/22, later had good graft function who was readmitted with status epilepticus in setting of UTI/antibiotics and sub-therapeutic valproic acid level.  Transferred to SICU on 7/25 with signs of sepsis. Biopsy from 7/29 demonstrating grade 1a rejection. Started pulse steroids (Solumedrol 500 on 7/30 -> 250 on 7/31).     1. 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  s/p pulse dose steroids. Send repeat DSA.   2. IS meds- On Belatacept . Started on Sirolimus .  mg po bid and continue Prednisone   3. AMS , seizures mentation improved significantly. On Phenytoin 100 mg q8 for seizure ppx  4. DM - off insulin gtt now and on Lantus 10 units at night and Lispro 6 units  tid with meals.   5. ESBL klebsiella UTI (7/15 + UCx, 7/25 +UCx)- Blood cx has been  negative, F/u Rpt blood cx . On Ertapenam until 8/3 + Fluconazole

## 2022-08-02 NOTE — ASSESSMENT
[FreeTextEntry1] : CHAD DUNBAR is a 66 year old male with a history of ESRD on HD pending renal tx, CAD s/p CABG, Afib on Eliquis, T2DM, CVA with residual weakness, here for hospital followup after bleeding gastric ulcer\par \par #Recent UGIB 2/2 PUD with acute blood loss anemia\par #Persistent anemia likely multifactorial (renal dz)\par # ESRD on HD pending renal tx\par # CAD s/p stents and CABG not on DAPT\par # AFib on Eliquis\par #History of colon polyps\par \par Recs:\par - Dicussed plan to repeat EGD to ensure ulcer healing\par - Continue PPI daily for now, will decide to increase if needed if going to restart ASA after procedure\par - Benefits and risks of the procedure (including but not limited to pain, infection, bleeding, perforation, injury to internal organs, missed lesions, IV site problems, risks of anesthesia, possible need for further procedures including emergency surgery) were explained to the patient. Alternatives to the procedure were discussed. The patient was agreeable to proceed.\par - PST needed\par - Would hold off on surveillance colonoscopy for now unless required to be done pre-tx (he is already listed), otherwise would wait until more medically stable for longer anesthesia and patient able to tolerate full prep\par \par RTO after endoscopy\par 
PAST MEDICAL HISTORY:  Asthma

## 2022-08-02 NOTE — PROGRESS NOTE ADULT - ASSESSMENT
66 year old Male with PMHx ESRD s/p PermCath removal 5/10/22 s/p Rt DDRT 4/21/22,  CVA (2019), Afib on apixaban, seizure activity, CAD s/p stents, CABG (2020), HTN, HLD, DM on insulin, gastric/duodenal ulcer, recent hospitalization 4/28/22 -5/10/22 with weakness/anemia found to have perinephric hematoma requiring evacuation and repair of bleeding arterial anastomosis who presented to [facility] for status epilepticus requiring intubation for hypoxic respiratory failure.     Subsequently developed continued seizures and hypothermia -> resolved   Was initiated on empiric antibiotics  s/p LP which was not suggestive of infectious process  Blood, urine and sputum cultures without positive sample    U/A (7/12) 161 WBC  UCx (7/13) 50-99K ESBL Klebsiella   s/p 3 days of Ertapenem    UA (7/25) with 40 WBC  UCx (7/25) 50-99k ESBL Klebsiella     Transaminitis trending down   Fever curve improved   S/p HD via L chest HD cath    RUQ (7/25) with hepatomegaly   CT c/a/p (7/25) with only small perinephric fluid collection, small volume ascites.  Doppler US R Kidney (7/27) with mild thickening of collecting system and ureter and small   Doppler US R Kidney (7/29) with fullness of collecting system and continued urothelial thickening.     RVP (7/25) Negative  CMV PCR (7/22) Negative  EBV PCR (7/25) Negative   HBV PCR (7/25) Negative   HHV-6 PCR (7/25) POSITIVE   Parvovirus IgM and PCR (7/26) Negative   HSV 1/2 PCR (7/26) Negative   Adenovirus PCR (7/26) Negative    #Fever, Transaminitis, Renal Transplant Recipient  --Continue Ertapenem 500 mg IV Q24H; anticipate completing at least 10 day course for ESBL Klebsiella UTI / pyelonephritis (End Date: 8/4/22)    #Abnormal Laboratory Test (HHV6 PCR)  HHV6 PCR minimally elevated; likely chromosomal integration  --Monitor off of therapy for this indication    I will sign off at this time. Please feel free to contact me with any further questions or concerns.    Carlton Hoover M.D.  [facility] Division of Infectious Disease  8AM-5PM Monday - Friday: Available on [employer] Teams  After Hours and Holidays (or if no response on [employer] Teams): Please contact the Infectious Diseases Office at [phone]     The above assessment and plan were discussed with Temi transplant surgery NP

## 2022-08-02 NOTE — PROGRESS NOTE ADULT - ASSESSMENT
66 yr old M with Type 2 DM> unknown control due to skewed A1C 6.6% secondary to chronic anemia and s/p blood tx. On basal/bolus insulin therapy PTA. DM c/b ESRD s/p DDRT on 3/21, CVA, CAD s/p CABG, Afib on apixaban, seizure activity, HTN, HLD, h/o GI bleed in 2/2022 EGD with duodenal ulcer, discharged to [facility] rehab center after prior hospitalization, now re-admitted from [facility] for seizures c/f status epilepticus in setting of UTI. endocrine consulted for steroid induced hyperglycemia, remains home dose prednisone 5mg. Prolonged hospital course w/ ICU stay, previously intubated/extubated, previous seizures. S/p ureteral stent removal 7/12/22 pt is now on high steropid doses due to rejection with anuria and worsening creat. Also on antibiotic for sepsis. BG at goal while on insulin drip and basal insulin but BG going up after insulin drip was discontinued last night. Primary team would like to c/w sq insulin regimen instead of insulin drip. Tolerating POs as per son. Recommending to increase basal insulin to improve FBG and add premeal insulin since pt is eating better. No hypoglycemia.  Will follow to keep BG goal (100-180mg/dl).

## 2022-08-02 NOTE — PROGRESS NOTE ADULT - ATTENDING COMMENTS
Pt is a 66 year old male with a medical history signficant for CAD (s/p CABG), AF (on Eliquis c/b CVA c/b seizures), HTN, HLD, DM type II, hypothyroidism, GI bleed due to a duodenal ulcer, and ESRD (s/p DDRT 4/21/22 c/b DGF requiring HD) with large perinephric hematoma (s/p evacuation w/ revision of arterial anastomosis) who presented in status epilepticus. Pt was intubated for airway protection. Pt transferred to floor 7/10 and readmitted to SICU 7/25 for AMS due to an ESBL klebsiella UTI, ANA/renal rejection, and renal failure requiring HD. Pt continues to have bradycardia.    encephalopathy improved  hx of seizures  Continue dilantin and multimodal pain control  Melatonin + protected sleep to improve delirium    Atelectasis  Continue ISP    HTN/CAD, S/p CABG  AFib on eliquis  Continue nifedipine  Restart hydralazine  Holdm coreg for bradycardia    Transaminitis  Continue to monitor  Reg diet    S/p DDRT 4/21 cb DGF, now w cf acute rejection, s/p IR renal bx 7/29  F/u IR renal bx pathology  Monitor I&Os and electrolytes  Nephrology f/u for HD    Continue SQ heparin    SBL klebsiella UTI (7/15 + UCx, 7/25 +UCx)  Cont Ertapenam until 8/3 + Fluconazole  Immunosuppresion per transplant nephro     DM2  Transitioned to Lantus

## 2022-08-02 NOTE — PROGRESS NOTE ADULT - ASSESSMENT
65 y/o male w/ a PMHx of CAD s/p CABG, AF on Eliquis c/b CVA c/b seizures, HTN, HLD, DM type II, hypothyroidism, GI bleed due to a duodenal ulcer, and ESRD s/p DDRT 4/21/22 c/b DGF requiring HD & large perinephric hematoma 5/22 s/p evacuation w/ revision of arterial anastomosis who presented in status epilepticus 6/10 from rehab requiring intubation for airway protection with a hospital course c/b large right pleural effusion s/p thoracentesis c/b hemothorax while being anticoagulated & requiring intubation for airway protection s/p chest tube placement w/ CT demonstrating retained hemothorax s/p right VATS, status epilepticus again causing AMS requiring intubation for airway protection again, delirium, dysphagia requiring NGT placement for enteral access, hyperkalemia, and poor glycemic control. Pt transferred to floor 7/10 and readmitted to SICU 7/25 for AMS, found to have ESBL klebsiella UTI, ANA, with renal rejection, and renal failure requiring HD.    Neuro: encephalopathy improved, hx of seizure  - Phenytoin 100 q8 for seizure ppx  - Appreciate neuro recs, no need to repeat levels at this time  - Multimodal pain control w/Tylenol  - Melatonin + protected sleep to improve delirium      Resp: no acute issues  - Out of bed to chair, ambulate as tolerated, and incentive spirometry to prevent atelectasis    CVS: hx of HTN, s/p CABG, AF on Eliquis  - Nifedipine 60 mg qd  - Coreg 25 mg q12 on hold  as patient is hypotensive  -start hydralazine 25mg q8h with holding SBP >130    GI: transaminitis  - Regular diet, soft and bite sized   - Transaminitis continues to improve, continue holding hydralazine, lipitor, bactrim  - Bowel regimen with Miralax, holding senna due to multiple bowel movements overnight    Renal: DDRT 4/21 cb DGF, now w cf acute rejection, s/p IR renal bx 7/29,  - Crt elevated  - Low uop   - HD last 7/30, PRN per Transplant nephro   - F/u IR renal bx pathology  - Monitor I&Os and electrolytes  - replete electrolytes as needed   - Doxazosin + Finasteride  - IV locked  - Last HD saturday 7/30 with 1.5L fluid removal through left EJ HD cath (placed by IR)    Heme:  - Monitor CBC and coags  - SCDs  - VTE ppx: heparin SC    ID: ESBL klebsiella UTI (7/15 + UCx, 7/25 +UCx)  - Blood cx negative 7/25, 7/27  - F/u Rpt blood cx   - Febrile w Tmax 38.6 on 7/30  - Monitor WBC, temperature, and procalcitonin  - Erbapenam until 8/3 + Fluconazole  -Immunosuppresion per transplant nephro     Endo: hx of DM, with Hyperglycemia,  Hypothyroidism,   - High dose Solumedrol in setting of possible acute graft rejection   - currently weaned off insulin drip after   - Lantus 10,u ISS  - Home Synthroid       67 y/o male w/ a PMHx of CAD s/p CABG, AF on Eliquis c/b CVA c/b seizures, HTN, HLD, DM type II, hypothyroidism, GI bleed due to a duodenal ulcer, and ESRD s/p DDRT 4/21/22 c/b DGF requiring HD & large perinephric hematoma 5/22 s/p evacuation w/ revision of arterial anastomosis who presented in status epilepticus 6/10 from rehab requiring intubation for airway protection with a hospital course c/b large right pleural effusion s/p thoracentesis c/b hemothorax while being anticoagulated & requiring intubation for airway protection s/p chest tube placement w/ CT demonstrating retained hemothorax s/p right VATS, status epilepticus again causing AMS requiring intubation for airway protection again, delirium, dysphagia requiring NGT placement for enteral access, hyperkalemia, and poor glycemic control. Pt transferred to floor 7/10 and readmitted to SICU 7/25 for AMS, found to have ESBL klebsiella UTI, ANA, with renal rejection, and renal failure requiring HD.    Neuro: encephalopathy improved, hx of seizure  - Phenytoin 100 q8 for seizure ppx  - Appreciate neuro recs, no need to repeat levels at this time  - Multimodal pain control w/Tylenol  - Melatonin + protected sleep to improve delirium      Resp: no acute issues  - Out of bed to chair, ambulate as tolerated, and incentive spirometry to prevent atelectasis    CVS: hx of HTN, s/p CABG, AF on Eliquis  - Nifedipine 60 mg qd  - Coreg on hold as patient is bradycardic  -start hydralazine 25mg q8h with holding SBP >130    GI: transaminitis  - Regular diet, soft and bite sized   - Transaminitis continues to improve, continue holding hydralazine, lipitor, bactrim  - Bowel regimen with Miralax, holding senna due to multiple bowel movements overnight    Renal: DDRT 4/21 cb DGF, now w cf acute rejection, s/p IR renal bx 7/29,  - Crt elevated  - Low uop   - HD last 7/30, PRN per Transplant nephro   - F/u IR renal bx pathology  - Monitor I&Os and electrolytes  - replete electrolytes as needed   - Doxazosin + Finasteride  - IV locked  - Last HD saturday 7/30 with 1.5L fluid removal through left EJ HD cath (placed by IR)    Heme:  - Monitor CBC and coags  - SCDs  - VTE ppx: heparin SC    ID: ESBL klebsiella UTI (7/15 + UCx, 7/25 +UCx)  - Blood cx negative 7/25, 7/27  - F/u Rpt blood cx   - Febrile w Tmax 38.6 on 7/30  - Monitor WBC, temperature, and procalcitonin  - Erbapenam until 8/3 + Fluconazole  -Immunosuppresion per transplant nephro     Endo: hx of DM, with Hyperglycemia,  Hypothyroidism,   - High dose Solumedrol in setting of possible acute graft rejection   - currently weaned off insulin drip after   - Lantus 10,u ISS  - Home Synthroid

## 2022-08-02 NOTE — PROGRESS NOTE ADULT - PROBLEM SELECTOR PLAN 1
-Test BG ac and hs and 2am while on steroid pulse  -Can increase Lantus dose to 12 units at bedtime. Slow titration due to worsening renal function   -Add Admelog 6 units ac meals. HOLD IF NOT EATING!!  -C/w Admelog correction scales ac and hs moderate dose while on high steroid doses and add moderate scale at 2am as well  -Please contact endo team with steroid doses as they are tapered down to make appropriate insulin doses adjustments   Discharge  plan to rehab, c/w basal bolus insulin final doses as above if FBG at goal  Titrate further at rehab as necessary .   Outpatient f/u with Endocrinologist Dr. Lincoln. [address] [facility] [address]. Phone  Needs apt at time of discharge  -Needs optho/renal/cardiac f/u as out pt  -Make sure pt has insulin and DM supplies at time of discharge.

## 2022-08-02 NOTE — PROGRESS NOTE ADULT - SUBJECTIVE AND OBJECTIVE BOX
DIABETES FOLLOW UP NOTE: Saw pt earlier today    Chief Complaint: Endocrine consult requested for management of T2DM    INTERVAL HX: Pt remains in ICU due to sepsis and transaminitis and now treated for possible rejection on high steroid doses (MTP 500mg 7/30> 250mg 7/31, 8/1 and today will received 125mg per ICU team. BG at goal while on insulin drip requiring 2-4 units/hr but primary team stopped drip for BG in 90s last night with FBG >200s and > 250s ac lunch. Per son pt eating 40 to 50% of meals plus Nepro. No hypoglycemia.Remains with worsening creat and anuria. Also on Prednisone 5mg daily    Review of Systems:  General: As above  Cardiovascular: No chest pain, palpitations  Respiratory: No SOB, no cough  GI: No nausea, vomiting, abdominal pain  Endocrine: No S&Sx of hypoglycemia    Allergies    No Known Allergies    Intolerances      MEDICATIONS:  ertapenem  IVPB 500 milliGRAM(s) IV Intermittent every 24 hours  finasteride 5 milliGRAM(s) Oral daily  fluconAZOLE IVPB 200 milliGRAM(s) IV Intermittent every 24 hours  insulin glargine Injectable (LANTUS) 10 Unit(s) SubCutaneous at bedtime  insulin lispro (ADMELOG) corrective regimen sliding scale   SubCutaneous three times a day before meals  insulin lispro (ADMELOG) corrective regimen sliding scale   SubCutaneous at bedtime  insulin lispro Injectable (ADMELOG) 6 Unit(s) SubCutaneous three times a day before meals  levothyroxine 50 MICROGram(s) Oral daily  predniSONE   Tablet 5 milliGRAM(s) Oral <User Schedule>  sirolimus 1 milliGRAM(s) Oral <User Schedule>        PHYSICAL EXAM:  VITALS: T(C): 36.2 (08-02-22 @ 05:00)  T(F): 97.2 (08-02-22 @ 05:00), Max: 97.2 (08-01-22 @ 19:00)  HR: 45 (08-02-22 @ 11:00) (41 - 51)  BP: 117/56 (08-02-22 @ 11:00) (117/56 - 186/82)  RR:  (11 - 22)  SpO2:  (96% - 100%)  Wt(kg): --  GENERAL: Male laying in bed in NAD. Appears tired and weak. Son at bedside  Abdomen: Soft, nontender, non distended  Extremities: Warm, no edema in all 4 exts  NEURO: Only answers yes/no to questions    LABS:  POCT Blood Glucose.: 259 mg/dL (08-02-22 @ 11:51)  POCT Blood Glucose.: 208 mg/dL (08-02-22 @ 07:50)  POCT Blood Glucose.: 149 mg/dL (08-01-22 @ 22:02)  POCT Blood Glucose.: 115 mg/dL (08-01-22 @ 21:19)  POCT Blood Glucose.: 94 mg/dL (08-01-22 @ 21:00)  POCT Blood Glucose.: 114 mg/dL (08-01-22 @ 20:02)  POCT Blood Glucose.: 131 mg/dL (08-01-22 @ 19:12)  POCT Blood Glucose.: 126 mg/dL (08-01-22 @ 18:14)  POCT Blood Glucose.: 125 mg/dL (08-01-22 @ 17:25)  POCT Blood Glucose.: 135 mg/dL (08-01-22 @ 16:36)  POCT Blood Glucose.: 161 mg/dL (08-01-22 @ 15:31)  POCT Blood Glucose.: 140 mg/dL (08-01-22 @ 14:28)  POCT Blood Glucose.: 132 mg/dL (08-01-22 @ 13:25)  POCT Blood Glucose.: 124 mg/dL (08-01-22 @ 12:26)  POCT Blood Glucose.: 125 mg/dL (08-01-22 @ 11:22)  POCT Blood Glucose.: 133 mg/dL (08-01-22 @ 10:15)  POCT Blood Glucose.: 133 mg/dL (08-01-22 @ 09:08)  POCT Blood Glucose.: 142 mg/dL (08-01-22 @ 09:05)  POCT Blood Glucose.: 173 mg/dL (08-01-22 @ 07:55)  POCT Blood Glucose.: 190 mg/dL (08-01-22 @ 06:53)  POCT Blood Glucose.: 194 mg/dL (08-01-22 @ 06:11)  POCT Blood Glucose.: 226 mg/dL (08-01-22 @ 05:08)  POCT Blood Glucose.: 257 mg/dL (08-01-22 @ 04:07)  POCT Blood Glucose.: 288 mg/dL (08-01-22 @ 03:01)  POCT Blood Glucose.: 301 mg/dL (08-01-22 @ 02:06)  POCT Blood Glucose.: 354 mg/dL (08-01-22 @ 00:06)  POCT Blood Glucose.: 402 mg/dL (07-31-22 @ 22:36)  POCT Blood Glucose.: 448 mg/dL (07-31-22 @ 20:25)  POCT Blood Glucose.: 390 mg/dL (07-31-22 @ 17:13)  POCT Blood Glucose.: 271 mg/dL (07-31-22 @ 11:48)  POCT Blood Glucose.: 275 mg/dL (07-31-22 @ 07:59)  POCT Blood Glucose.: 296 mg/dL (07-30-22 @ 21:39)  POCT Blood Glucose.: 230 mg/dL (07-30-22 @ 17:48)                            11.2   12.03 )-----------( 455      ( 01 Aug 2022 23:41 )             34.2       08-01    132<L>  |  93<L>  |  77<H>  ----------------------------<  206<H>  5.2   |  25  |  3.69<H>    eGFR: 17<L>    Ca    8.3<L>      08-01  Mg     2.4     08-01  Phos  4.3     08-01    TPro  5.6<L>  /  Alb  1.9<L>  /  TBili  0.3  /  DBili  x   /  AST  44<H>  /  ALT  53<H>  /  AlkPhos  316<H>  08-01        Thyroid Function Tests:  07-06 @ 18:36 TSH 4.69 FreeT4 -- T3 43 Anti TPO -- Anti Thyroglobulin Ab -- TSI --      A1C with Estimated Average Glucose Result: 6.0 % (06-30-22 @ 05:49)      Estimated Average Glucose: 126 mg/dL (06-30-22 @ 05:49)        07-27 Chol -- Direct LDL -- LDL calculated -- HDL -- Trig 118

## 2022-08-02 NOTE — PROGRESS NOTE ADULT - SUBJECTIVE AND OBJECTIVE BOX
HISTORY  66y Male    24 HOUR EVENTS:    SUBJECTIVE/ROS:  [ ] A ten-point review of systems was otherwise negative except as noted.  [ ] Due to altered mental status/intubation, subjective information were not able to be obtained from the patient. History was obtained, to the extent possible, from review of the chart and collateral sources of information.      NEURO  RASS:     GCS:     CAM ICU:  Exam: awake, alert, oriented  Meds: acetaminophen     Tablet .. 650 milliGRAM(s) Oral every 6 hours PRN Mild Pain (1 - 3)  melatonin 6 milliGRAM(s) Oral at bedtime  phenytoin   Capsule 100 milliGRAM(s) Oral every 8 hours    [x] Adequacy of sedation and pain control has been assessed and adjusted      RESPIRATORY  RR: 13 (08-02-22 @ 00:00) (11 - 30)  SpO2: 98% (08-02-22 @ 00:00) (97% - 100%)  Wt(kg): --  Exam: unlabored, clear to auscultation bilaterally  Mechanical Ventilation:     [N/A] Extubation Readiness Assessed  Meds:       CARDIOVASCULAR  HR: 50 (08-02-22 @ 00:00) (40 - 50)  BP: 172/75 (08-02-22 @ 00:00) (115/56 - 186/82)  BP(mean): 108 (08-02-22 @ 00:00) (80 - 118)  ABP: --  ABP(mean): --  Wt(kg): --  CVP(cm H2O): --      Exam: regular rate and rhythm  Cardiac Rhythm: sinus  Perfusion     [x]Adequate   [ ]Inadequate  Mentation   [x]Normal       [ ]Reduced  Extremities  [x]Warm         [ ]Cool  Volume Status [ ]Hypervolemic [x]Euvolemic [ ]Hypovolemic  Meds: doxazosin 4 milliGRAM(s) Oral <User Schedule>  hydrALAZINE 25 milliGRAM(s) Oral every 8 hours  NIFEdipine XL 60 milliGRAM(s) Oral every 12 hours        GI/NUTRITION  Exam: soft, nontender, nondistended, incision C/D/I  Diet:  Meds: pantoprazole    Tablet 40 milliGRAM(s) Oral daily  polyethylene glycol 3350 17 Gram(s) Oral daily  senna 2 Tablet(s) Oral at bedtime      GENITOURINARY  I&O's Detail    07-31 @ 07:01  -  08-01 @ 07:00  --------------------------------------------------------  IN:    Insulin: 23 mL    IV PiggyBack: 200 mL    Oral Fluid: 711 mL  Total IN: 934 mL    OUT:    Indwelling Catheter - Urethral (mL): 70 mL  Total OUT: 70 mL    Total NET: 864 mL      08-01 @ 07:01  -  08-02 @ 01:59  --------------------------------------------------------  IN:    Insulin: 20 mL    Insulin: 21 mL    IV PiggyBack: 300 mL    Oral Fluid: 250 mL  Total IN: 591 mL    OUT:  Total OUT: 0 mL    Total NET: 591 mL          08-01    132<L>  |  93<L>  |  77<H>  ----------------------------<  206<H>  5.2   |  25  |  3.69<H>    Ca    8.3<L>      01 Aug 2022 23:41  Phos  4.3     08-01  Mg     2.4     08-01    TPro  5.6<L>  /  Alb  1.9<L>  /  TBili  0.3  /  DBili  x   /  AST  44<H>  /  ALT  53<H>  /  AlkPhos  316<H>  08-01    [ ] Miranda catheter, indication: N/A  Meds: sodium bicarbonate 1300 milliGRAM(s) Oral <User Schedule>        HEMATOLOGIC  Meds: heparin   Injectable 5000 Unit(s) SubCutaneous every 8 hours    [x] VTE Prophylaxis                        11.2   x     )-----------( 455      ( 01 Aug 2022 23:41 )             34.2     PT/INR - ( 01 Aug 2022 23:41 )   PT: 14.2 sec;   INR: 1.22 ratio         PTT - ( 01 Aug 2022 23:41 )  PTT:31.4 sec  Transfusion     [ ] PRBC   [ ] Platelets   [ ] FFP   [ ] Cryoprecipitate      INFECTIOUS DISEASES  WBC Count: 9.81 K/uL (08-01 @ 05:43)    RECENT CULTURES:  Specimen Source: .Blood Blood-Peripheral  Date/Time: 07-30 @ 06:19  Culture Results:   No growth to date.  Gram Stain: --  Organism: --  Specimen Source: .Blood Blood-Peripheral  Date/Time: 07-30 @ 06:07  Culture Results:   No growth to date.  Gram Stain: --  Organism: --    Meds: epoetin cortney-epbx (RETACRIT) Injectable 25012 Unit(s) SubCutaneous every 7 days  ertapenem  IVPB 500 milliGRAM(s) IV Intermittent every 24 hours  fluconAZOLE IVPB 200 milliGRAM(s) IV Intermittent every 24 hours  mycophenolate mofetil 1000 milliGRAM(s) Oral every 12 hours  sirolimus 1 milliGRAM(s) Oral <User Schedule>        ENDOCRINE  CAPILLARY BLOOD GLUCOSE      POCT Blood Glucose.: 149 mg/dL (01 Aug 2022 22:02)  POCT Blood Glucose.: 115 mg/dL (01 Aug 2022 21:19)  POCT Blood Glucose.: 94 mg/dL (01 Aug 2022 21:00)  POCT Blood Glucose.: 114 mg/dL (01 Aug 2022 20:02)  POCT Blood Glucose.: 131 mg/dL (01 Aug 2022 19:12)  POCT Blood Glucose.: 126 mg/dL (01 Aug 2022 18:14)  POCT Blood Glucose.: 125 mg/dL (01 Aug 2022 17:25)  POCT Blood Glucose.: 135 mg/dL (01 Aug 2022 16:36)  POCT Blood Glucose.: 161 mg/dL (01 Aug 2022 15:31)  POCT Blood Glucose.: 140 mg/dL (01 Aug 2022 14:28)  POCT Blood Glucose.: 132 mg/dL (01 Aug 2022 13:25)  POCT Blood Glucose.: 124 mg/dL (01 Aug 2022 12:26)  POCT Blood Glucose.: 125 mg/dL (01 Aug 2022 11:22)  POCT Blood Glucose.: 133 mg/dL (01 Aug 2022 10:15)  POCT Blood Glucose.: 133 mg/dL (01 Aug 2022 09:08)  POCT Blood Glucose.: 142 mg/dL (01 Aug 2022 09:05)  POCT Blood Glucose.: 173 mg/dL (01 Aug 2022 07:55)  POCT Blood Glucose.: 190 mg/dL (01 Aug 2022 06:53)  POCT Blood Glucose.: 194 mg/dL (01 Aug 2022 06:11)  POCT Blood Glucose.: 226 mg/dL (01 Aug 2022 05:08)  POCT Blood Glucose.: 257 mg/dL (01 Aug 2022 04:07)  POCT Blood Glucose.: 288 mg/dL (01 Aug 2022 03:01)  POCT Blood Glucose.: 301 mg/dL (01 Aug 2022 02:06)    Meds: finasteride 5 milliGRAM(s) Oral daily  insulin glargine Injectable (LANTUS) 10 Unit(s) SubCutaneous at bedtime  insulin lispro (ADMELOG) corrective regimen sliding scale   SubCutaneous three times a day before meals  insulin lispro (ADMELOG) corrective regimen sliding scale   SubCutaneous at bedtime  levothyroxine 50 MICROGram(s) Oral daily  predniSONE   Tablet 5 milliGRAM(s) Oral <User Schedule>        ACCESS DEVICES:  [ ] Peripheral IV  [ ] Central Venous Line	[ ] R	[ ] L	[ ] IJ	[ ] Fem	[ ] SC	Placed:   [ ] Arterial Line		[ ] R	[ ] L	[ ] Fem	[ ] Rad	[ ] Ax	Placed:   [ ] PICC:					[ ] Mediport  [ ] Urinary Catheter, Date Placed:   [x] Necessity of urinary, arterial, and venous catheters discussed    OTHER MEDICATIONS:  chlorhexidine 2% Cloths 1 Application(s) Topical <User Schedule>      CODE STATUS:      IMAGING:   24 HOUR EVENTS:  - insulin dirp for hyperglycemia with 10unit of lantus bedtime in prep of weaning off insulin  - CPAP for overnight NILO  - patient biopsy found to have rejection was given solumedrol today again 250mg  -a dose of belatacept 625mg  -HR is downtrending to 40's carvedilol d/c'd  - tucker d/c'd will do bladder scan qshift      HISTORY:  Patient is a 65 y/o male w/ a PMHx of CAD s/p CABG, AF on Eliquis c/b CVA c/b seizures, HTN, HLD, DM type II, hypothyroidism, GI bleed due to a duodenal ulcer, and ESRD s/p DDRT 4/21/22 c/b DGF requiring HD & large perinephric hematoma 5/22 s/p evacuation w/ revision of arterial anastomosis who presented in status epilepticus 6/10 from rehab requiring intubation for airway protection with a hospital course c/b large right pleural effusion s/p thoracentesis c/b hemothorax while being anticoagulated & requiring intubation for airway protection s/p chest tube placement w/ CT demonstrating retained hemothorax s/p right VATS, status epilepticus again causing AMS requiring intubation for airway protection again, delirium, dysphagia requiring NGT placement for enteral access, hyperkalemia, and poor glycemic control. Pt transferred to floor 7/10 and readmitted to SICU 7/25 for leukopenia, transaminitis, and ANA, and AMS in the setting of sepsis. Course c/b renal failure requiring HD with kidney rejection.          SUBJECTIVE/ROS:  [x ] A ten-point review of systems was otherwise negative except as noted.  [ ] Due to altered mental status/intubation, subjective information were not able to be obtained from the patient. History was obtained, to the extent possible, from review of the chart and collateral sources of information.      NEURO  Exam: awake, alert, oriented  Meds: acetaminophen     Tablet .. 650 milliGRAM(s) Oral every 6 hours PRN Mild Pain (1 - 3)  melatonin 6 milliGRAM(s) Oral at bedtime  phenytoin   Capsule 100 milliGRAM(s) Oral every 8 hours    [x] Adequacy of sedation and pain control has been assessed and adjusted      RESPIRATORY  RR: 13 (08-02-22 @ 00:00) (11 - 30)  SpO2: 98% (08-02-22 @ 00:00) (97% - 100%)  Exam: unlabored, clear to auscultation bilaterally  [N/A] Extubation Readiness Assessed        CARDIOVASCULAR  HR: 50 (08-02-22 @ 00:00) (40 - 50)  BP: 172/75 (08-02-22 @ 00:00) (115/56 - 186/82)  BP(mean): 108 (08-02-22 @ 00:00) (80 - 118)      Exam: regular rate and rhythm  Cardiac Rhythm: sinus  Perfusion     [x]Adequate   [ ]Inadequate  Mentation   [x]Normal       [ ]Reduced  Extremities  [x]Warm         [ ]Cool  Volume Status [ ]Hypervolemic [x]Euvolemic [ ]Hypovolemic  Meds: doxazosin 4 milliGRAM(s) Oral <User Schedule>  hydrALAZINE 25 milliGRAM(s) Oral every 8 hours  NIFEdipine XL 60 milliGRAM(s) Oral every 12 hours        GI/NUTRITION  Exam: soft, nontender, nondistended, incision C/D/I  Diet: carb consistent, renal diet  Meds: pantoprazole    Tablet 40 milliGRAM(s) Oral daily  polyethylene glycol 3350 17 Gram(s) Oral daily  senna 2 Tablet(s) Oral at bedtime      GENITOURINARY  I&O's Detail    07-31 @ 07:01  -  08-01 @ 07:00  --------------------------------------------------------  IN:    Insulin: 23 mL    IV PiggyBack: 200 mL    Oral Fluid: 711 mL  Total IN: 934 mL    OUT:    Indwelling Catheter - Urethral (mL): 70 mL  Total OUT: 70 mL    Total NET: 864 mL      08-01 @ 07:01  -  08-02 @ 01:59  --------------------------------------------------------  IN:    Insulin: 20 mL    Insulin: 21 mL    IV PiggyBack: 300 mL    Oral Fluid: 250 mL  Total IN: 591 mL    OUT:  Total OUT: 0 mL    Total NET: 591 mL          08-01    132<L>  |  93<L>  |  77<H>  ----------------------------<  206<H>  5.2   |  25  |  3.69<H>    Ca    8.3<L>      01 Aug 2022 23:41  Phos  4.3     08-01  Mg     2.4     08-01    TPro  5.6<L>  /  Alb  1.9<L>  /  TBili  0.3  /  DBili  x   /  AST  44<H>  /  ALT  53<H>  /  AlkPhos  316<H>  08-01    [ ] Tucker catheter, indication: N/A  Meds: sodium bicarbonate 1300 milliGRAM(s) Oral <User Schedule>        HEMATOLOGIC  Meds: heparin   Injectable 5000 Unit(s) SubCutaneous every 8 hours    [x] VTE Prophylaxis                        11.2   x     )-----------( 455      ( 01 Aug 2022 23:41 )             34.2     PT/INR - ( 01 Aug 2022 23:41 )   PT: 14.2 sec;   INR: 1.22 ratio         PTT - ( 01 Aug 2022 23:41 )  PTT:31.4 sec  Transfusion     [ ] PRBC   [ ] Platelets   [ ] FFP   [ ] Cryoprecipitate      INFECTIOUS DISEASES  WBC Count: 9.81 K/uL (08-01 @ 05:43)    RECENT CULTURES:  Specimen Source: .Blood Blood-Peripheral  Date/Time: 07-30 @ 06:19  Culture Results:   No growth to date.  Gram Stain: --  Organism: --  Specimen Source: .Blood Blood-Peripheral  Date/Time: 07-30 @ 06:07  Culture Results:   No growth to date.  Gram Stain: --  Organism: --    Meds: epoetin cortney-epbx (RETACRIT) Injectable 82862 Unit(s) SubCutaneous every 7 days  ertapenem  IVPB 500 milliGRAM(s) IV Intermittent every 24 hours  fluconAZOLE IVPB 200 milliGRAM(s) IV Intermittent every 24 hours  mycophenolate mofetil 1000 milliGRAM(s) Oral every 12 hours  sirolimus 1 milliGRAM(s) Oral <User Schedule>        ENDOCRINE  CAPILLARY BLOOD GLUCOSE      POCT Blood Glucose.: 149 mg/dL (01 Aug 2022 22:02)  POCT Blood Glucose.: 115 mg/dL (01 Aug 2022 21:19)  POCT Blood Glucose.: 94 mg/dL (01 Aug 2022 21:00)  POCT Blood Glucose.: 114 mg/dL (01 Aug 2022 20:02)  POCT Blood Glucose.: 131 mg/dL (01 Aug 2022 19:12)  POCT Blood Glucose.: 126 mg/dL (01 Aug 2022 18:14)  POCT Blood Glucose.: 125 mg/dL (01 Aug 2022 17:25)  POCT Blood Glucose.: 135 mg/dL (01 Aug 2022 16:36)  POCT Blood Glucose.: 161 mg/dL (01 Aug 2022 15:31)  POCT Blood Glucose.: 140 mg/dL (01 Aug 2022 14:28)  POCT Blood Glucose.: 132 mg/dL (01 Aug 2022 13:25)  POCT Blood Glucose.: 124 mg/dL (01 Aug 2022 12:26)  POCT Blood Glucose.: 125 mg/dL (01 Aug 2022 11:22)  POCT Blood Glucose.: 133 mg/dL (01 Aug 2022 10:15)  POCT Blood Glucose.: 133 mg/dL (01 Aug 2022 09:08)  POCT Blood Glucose.: 142 mg/dL (01 Aug 2022 09:05)  POCT Blood Glucose.: 173 mg/dL (01 Aug 2022 07:55)  POCT Blood Glucose.: 190 mg/dL (01 Aug 2022 06:53)  POCT Blood Glucose.: 194 mg/dL (01 Aug 2022 06:11)  POCT Blood Glucose.: 226 mg/dL (01 Aug 2022 05:08)  POCT Blood Glucose.: 257 mg/dL (01 Aug 2022 04:07)  POCT Blood Glucose.: 288 mg/dL (01 Aug 2022 03:01)  POCT Blood Glucose.: 301 mg/dL (01 Aug 2022 02:06)    Meds: finasteride 5 milliGRAM(s) Oral daily  insulin glargine Injectable (LANTUS) 10 Unit(s) SubCutaneous at bedtime  insulin lispro (ADMELOG) corrective regimen sliding scale   SubCutaneous three times a day before meals  insulin lispro (ADMELOG) corrective regimen sliding scale   SubCutaneous at bedtime  levothyroxine 50 MICROGram(s) Oral daily  predniSONE   Tablet 5 milliGRAM(s) Oral <User Schedule>        ACCESS DEVICES:  [x ] Peripheral IV  [x ] Central Venous Line	[ ] R	[x ] L	[x ] EJ	[ ] Fem	[ ] SC	Placed:   [ ] Arterial Line		[ ] R	[ ] L	[ ] Fem	[ ] Rad	[ ] Ax	Placed:   [ ] PICC:					[ ] Mediport  [ ] Urinary Catheter, Date Placed:   [x] Necessity of urinary, arterial, and venous catheters discussed    OTHER MEDICATIONS:  chlorhexidine 2% Cloths 1 Application(s) Topical <User Schedule>      CODE STATUS:  full code

## 2022-08-03 NOTE — PROGRESS NOTE ADULT - NSPROGADDITIONALINFOA_GEN_ALL_CORE
-Plan discussed with pt/team.  Contact info: [phone] (24/7). pager [phone]  [email or web address] password [email or web address]  Spent 30 minutes assessing pt/labs/meds and discussing plan of care with primary team/ son  Adjusting insulin  Discharge plan  Follow up care

## 2022-08-03 NOTE — PROGRESS NOTE ADULT - PROBLEM SELECTOR PLAN 1
-Test BG ac and hs and 2am while on steroid pulse  -C/w  Lantus dose to 12 units at bedtime since renakl function has worsen and steroid doses are coming down.   -Add Admelog 6 units ac meals. HOLD IF NOT EATING!!  -C/w Admelog correction scales ac and hs moderate dose and add 2am moderate dose as well to correct lingering hyperglycemia.    -Please contact endo team with steroid doses as they are tapered down to make appropriate insulin doses adjustments   Discharge  plan to rehab, c/w basal bolus insulin final doses as above if FBG at goal  Titrate further at rehab as necessary .   Outpatient f/u with Endocrinologist Dr. Lincoln. [address] [facility] [address]. Phone  Needs apt at time of discharge  -Needs optho/renal/cardiac f/u as out pt  -Make sure pt has insulin and DM supplies at time of discharge. -Test BG ac and hs and 2am while on steroid pulse  -C/w  Lantus dose to 12 units at bedtime since renakl function has worsen and steroid doses are coming down.   -Add Admelog 6 units ac meals. HOLD IF NOT EATING!!  -C/w Admelog correction scales ac and hs moderate dose and add 2am moderate dose as well to correct lingering hyperglycemia.    -Please contact endo team with steroid doses as they are tapered down to make appropriate insulin doses adjustments. Noted prednisone taper to be started tomorrow so will adjust insulin as needed once on PO steroids   Discharge  plan to rehab, c/w basal bolus insulin final doses as above if FBG at goal  Titrate further at rehab as necessary .   Outpatient f/u with Endocrinologist Dr. Lincoln. [address] [facility] [address]. Phone  Needs apt at time of discharge  -Needs optho/renal/cardiac f/u as out pt  -Make sure pt has insulin and DM supplies at time of discharge.

## 2022-08-03 NOTE — PROGRESS NOTE ADULT - SUBJECTIVE AND OBJECTIVE BOX
[facility] DIVISION OF KIDNEY DISEASES AND HYPERTENSION -- FOLLOW UP NOTE  --------------------------------------------------------------------------------  Chief Complaint:    24 hour events/subjective:  Patient was seen and examined at bedside  remains anuric. Scheduled for dialysis.     PAST HISTORY  --------------------------------------------------------------------------------  No significant changes to PMH, PSH, FHx, SHx, unless otherwise noted    ALLERGIES & MEDICATIONS  --------------------------------------------------------------------------------  Allergies    No Known Allergies    Intolerances      Standing Inpatient Medications  chlorhexidine 2% Cloths 1 Application(s) Topical <User Schedule>  doxazosin 4 milliGRAM(s) Oral <User Schedule>  epoetin cortney-epbx (RETACRIT) Injectable 91313 Unit(s) SubCutaneous every 7 days  ertapenem  IVPB 500 milliGRAM(s) IV Intermittent every 24 hours  finasteride 5 milliGRAM(s) Oral daily  fluconAZOLE IVPB 200 milliGRAM(s) IV Intermittent every 24 hours  heparin   Injectable 5000 Unit(s) SubCutaneous every 8 hours  hydrALAZINE 25 milliGRAM(s) Oral every 8 hours  insulin glargine Injectable (LANTUS) 12 Unit(s) SubCutaneous at bedtime  insulin lispro (ADMELOG) corrective regimen sliding scale   SubCutaneous three times a day before meals  insulin lispro (ADMELOG) corrective regimen sliding scale   SubCutaneous at bedtime  levothyroxine 50 MICROGram(s) Oral daily  melatonin 6 milliGRAM(s) Oral at bedtime  mycophenolate mofetil 1000 milliGRAM(s) Oral every 12 hours  NIFEdipine XL 60 milliGRAM(s) Oral every 12 hours  pantoprazole    Tablet 40 milliGRAM(s) Oral daily  phenytoin   Capsule 100 milliGRAM(s) Oral every 8 hours  polyethylene glycol 3350 17 Gram(s) Oral daily  predniSONE   Tablet 5 milliGRAM(s) Oral <User Schedule>  senna 2 Tablet(s) Oral at bedtime  sirolimus 1 milliGRAM(s) Oral <User Schedule>  sodium bicarbonate 1300 milliGRAM(s) Oral <User Schedule>    PRN Inpatient Medications  acetaminophen     Tablet .. 650 milliGRAM(s) Oral every 6 hours PRN      REVIEW OF SYSTEMS  --------------------------------------------------------------------------------  Gen: No fatigue, fevers/chills, weakness  Skin: No rashes  Head/Eyes/Ears/Mouth: No headache;No sore throat  Respiratory: No dyspnea, cough,   CV: No chest pain, PND, orthopnea  GI: No abdominal pain, diarrhea, constipation, nausea, vomiting  Transplant: No pain  : No increased frequency, dysuria, hematuria, nocturia  MSK: No joint pain/swelling; no back pain; no edema  Neuro: No dizziness/lightheadedness, weakness, seizures, numbness, tingling  Psych: No significant nervousness, anxiety, stress, depression    All other systems were reviewed and are negative, except as noted.    VITALS/PHYSICAL EXAM  --------------------------------------------------------------------------------  T(C): 36.3 (08-03-22 @ 03:00), Max: 36.6 (08-02-22 @ 16:00)  HR: 44 (08-03-22 @ 07:00) (43 - 47)  BP: 141/58 (08-03-22 @ 07:00) (111/77 - 189/91)  RR: 17 (08-03-22 @ 07:00) (12 - 20)  SpO2: 98% (08-03-22 @ 07:00) (95% - 100%)  Wt(kg): --        08-02-22 @ 07:01  -  08-03-22 @ 07:00  --------------------------------------------------------  IN: 1110.5 mL / OUT: 0 mL / NET: 1110.5 mL      Physical Exam:  	Gen: NAD  	HEENT: PERRL, supple neck, clear oropharynx  	Pulm: CTA B/L  	CV: RRR, S1S2; no rub  	Abd: +BS, soft, nontender/nondistended                      Transplant: No tenderness, swelling  	UE: Warm, FROM; no edema; no asterixis  	LE: Warm, FROM; no edema  	Neuro: No focal deficits  	Skin: Warm, without rashes      LABS/STUDIES  --------------------------------------------------------------------------------              10.2   14.06 >-----------<  446      [08-02-22 @ 23:18]              31.3     130  |  89  |  97  ----------------------------<  84      [08-03-22 @ 07:03]  5.2   |  24  |  4.40        Ca     8.5     [08-03-22 @ 07:03]      Mg     2.3     [08-02-22 @ 23:18]      Phos  4.7     [08-02-22 @ 23:18]    TPro  5.9  /  Alb  2.3  /  TBili  0.2  /  DBili  x   /  AST  34  /  ALT  44  /  AlkPhos  290  [08-02-22 @ 23:18]    PT/INR: PT 13.9 , INR 1.20       [08-02-22 @ 23:18]  PTT: 31.3       [08-02-22 @ 23:18]      Creatinine Trend:  SCr 4.40 [08-03 @ 07:03]  SCr 4.26 [08-02 @ 23:18]  SCr 4.09 [08-02 @ 18:59]  SCr 3.69 [08-01 @ 23:41]  SCr 3.59 [08-01 @ 05:43]        Sirolimus (Rapamycin) Level, Serum: 6.1 ng/mL (08-02 @ 23:18)  Sirolimus (Rapamycin) Level, Serum: 6.7 ng/mL (08-01 @ 23:41)        Urinalysis - [07-25-22 @ 18:28]      Color Yellow / Appearance Slightly Turbid / SG 1.020 / pH 5.5      Gluc Negative / Ketone Negative  / Bili Negative / Urobili Negative       Blood Small / Protein 100 / Leuk Est Large / Nitrite Negative      RBC 2 / WBC 40 / Hyaline 5 / Gran  / Sq Epi  / Non Sq Epi 0 / Bacteria Many      Iron 17, TIBC 171, %sat 10      [05-02-22 @ 13:03]  Ferritin 6336      [07-27-22 @ 13:00]  PTH -- (Ca 9.2)      [02-05-22 @ 10:13]   294  HbA1c 6.0      [02-21-20 @ 08:53]  TSH 4.69      [07-06-22 @ 18:36]  Lipid: chol --, , HDL --, LDL --      [07-27-22 @ 13:00]    HBsAg Nonreact      [07-25-22 @ 11:51]  HCV 0.18, Nonreact      [07-25-22 @ 11:51]

## 2022-08-03 NOTE — PROGRESS NOTE ADULT - SUBJECTIVE AND OBJECTIVE BOX
Transplant Surgery - Multidisciplinary Progress Note  --------------------------------------------------------------  DDRT     4/21/2022             Present:  Patient seen with multidisciplinary team including Transplant Surgeon: Dr. David, Transplant Nephrologist: Dr. Kindra ISBELL, nephrology fellow, Transplant Pharmacist: Bryan Lim, ACp's: Temi Styles in am rounds and examined with Dr. David. Disciplines not in attendance will be notified of the plan.     66M with PMH: DM type II, HTN, CAD s/p CABG in 2020, AFib on Eliquis, CVA in 2019 due to Afib, Seizure d/o following CVA, last episode was on 4/8/22, h/o GIB in 2/2022 EGD with duodenal ulcer.  ESRD due to DM was on HD since 2019.     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  Donor was 58 , KDPI 82%, DCD, single vessels and ureter, HLA mismatch 1, 2, 2. No DSA, cPRA 0%. 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 Re-admitted in May for anemia in the setting of large perinephric hematoma s/p evacuation and repair of arterial anastomosis on 5/2/22. Intra operative biopsy showed no rejection, Creatinine ranging ~2mg/dL.    In Rehab:  He was recently dx with klebsiella UTI rx with ertapenem - then switched to Levaquin day #6.    He also had parainfluenza pneumonia. Was at rehab progressing well.      Hospital course:  - 6/10: transferred from rehab facility for seizure status epilepticus. Intubated for respiratory protection and transferred to MICU.  EEG showed no seizure activity. Neuro consulted.   - 6/11: Extubated. Found to have R pleural effusion. s/p Thoracentesis 1.3L. Eliquis changed to heparin drip.  Post procedure drop in H&H. 5u PRBC, 2 u FFP.   - 6/11 evening: Transferred to SICU from MICU for further care in setting of hypoxia, significant R pleural effusion, anemia and hemodynamic instability  - 6/11 Intubated at 9pm and sedated on Precedex.  CT placed, 600ml blood drained.  1L total overnight, started pressors  - 6/11 Received Protamine for PTT >100 (was on Heparin drip started for AF), 2 u FFP and 5u PRBC total  - 6/13 s/p VATS 2.2L old blood removed; second chest tube placed  - 6/18-19: chest tubes removed  - 6/21: ?PRES vs. chronic ischemic changes on MRI, off Envarsus, switched to Belatacept 6/23 with good graft function  - 6/25: Tx to SICU for refractory seizures, improved with IV antiepileptic regimen  - 7/10: Passed bedside S&S in SICU. Downgraded from SICU to floor.   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 8/1   - Started on sirolomus 1mg yesterday, MMF held (was on 500 BID leukopenia), Pred 5mg daily   - Ongoing monitoring for signs of rejection.    Potential Discharge date: pending clinical improvement.     Education:  Medications    Plan of care:  See Below       MEDICATIONS  (STANDING):  chlorhexidine 2% Cloths 1 Application(s) Topical <User Schedule>  doxazosin 4 milliGRAM(s) Oral <User Schedule>  epoetin cortney-epbx (RETACRIT) Injectable 55166 Unit(s) SubCutaneous every 7 days  ertapenem  IVPB 500 milliGRAM(s) IV Intermittent every 24 hours  finasteride 5 milliGRAM(s) Oral daily  fluconAZOLE IVPB 200 milliGRAM(s) IV Intermittent every 24 hours  heparin   Injectable 5000 Unit(s) SubCutaneous every 8 hours  hydrALAZINE 25 milliGRAM(s) Oral every 8 hours  insulin glargine Injectable (LANTUS) 10 Unit(s) SubCutaneous at bedtime  insulin lispro (ADMELOG) corrective regimen sliding scale   SubCutaneous three times a day before meals  insulin lispro (ADMELOG) corrective regimen sliding scale   SubCutaneous at bedtime  insulin lispro Injectable (ADMELOG) 6 Unit(s) SubCutaneous three times a day before meals  levothyroxine 50 MICROGram(s) Oral daily  melatonin 6 milliGRAM(s) Oral at bedtime  mycophenolate mofetil 1000 milliGRAM(s) Oral every 12 hours  NIFEdipine XL 60 milliGRAM(s) Oral every 12 hours  pantoprazole    Tablet 40 milliGRAM(s) Oral daily  phenytoin   Capsule 100 milliGRAM(s) Oral every 8 hours  polyethylene glycol 3350 17 Gram(s) Oral daily  predniSONE   Tablet 5 milliGRAM(s) Oral <User Schedule>  senna 2 Tablet(s) Oral at bedtime  sirolimus 1 milliGRAM(s) Oral <User Schedule>  sodium bicarbonate 1300 milliGRAM(s) Oral <User Schedule>    MEDICATIONS  (PRN):  acetaminophen     Tablet .. 650 milliGRAM(s) Oral every 6 hours PRN Mild Pain (1 - 3)      PAST MEDICAL & SURGICAL HISTORY:  Hypertension      Diabetes      Dyslipidemia      CAD (Coronary Artery Disease)  with Stents in 06/2009 and 6/2019, s/p off-pump C3L on 8/13/20      Hypothyroidism      CVA (cerebral vascular accident)  12/13/19 with residual bilateral weakness      Anemia      PEG (percutaneous endoscopic gastrostomy) status  removed July 2020      Intubation of airway performed without difficulty  Dec 2019  CVA c/b status epilepticus requiring intubation and PEG in 12/2019-      ESRD on dialysis  M/W/F      Seizures  after CVA, last seizure was last week 4/07/22      History of insertion of stent into coronary artery bypass graft  2009 and 2019      S/P CABG x 3  off pump C3L on 8/13/20          Vital Signs Last 24 Hrs  T(C): 36.2 (02 Aug 2022 05:00), Max: 36.2 (01 Aug 2022 19:00)  T(F): 97.2 (02 Aug 2022 05:00), Max: 97.2 (01 Aug 2022 19:00)  HR: 45 (02 Aug 2022 11:00) (41 - 51)  BP: 117/56 (02 Aug 2022 11:00) (117/56 - 186/82)  BP(mean): 81 (02 Aug 2022 11:00) (81 - 127)  RR: 16 (02 Aug 2022 11:00) (11 - 22)  SpO2: 99% (02 Aug 2022 11:00) (96% - 100%)    Parameters below as of 02 Aug 2022 10:00  Patient On (Oxygen Delivery Method): room air        I&O's Summary    01 Aug 2022 07:01  -  02 Aug 2022 07:00  --------------------------------------------------------  IN: 791 mL / OUT: 0 mL / NET: 791 mL    02 Aug 2022 07:01  -  02 Aug 2022 13:18  --------------------------------------------------------  IN: 200 mL / OUT: 0 mL / NET: 200 mL                              11.2   12.03 )-----------( 455      ( 01 Aug 2022 23:41 )             34.2     08-01    132<L>  |  93<L>  |  77<H>  ----------------------------<  206<H>  5.2   |  25  |  3.69<H>    Ca    8.3<L>      01 Aug 2022 23:41  Phos  4.3     08-01  Mg     2.4     08-01    TPro  5.6<L>  /  Alb  1.9<L>  /  TBili  0.3  /  DBili  x   /  AST  44<H>  /  ALT  53<H>  /  AlkPhos  316<H>  08-01          Culture - Blood (collected 07-30-22 @ 06:19)  Source: .Blood Blood-Peripheral  Preliminary Report (07-31-22 @ 11:01):    No growth to date.    Culture - Blood (collected 07-30-22 @ 06:07)  Source: .Blood Blood-Peripheral  Preliminary Report (07-31-22 @ 11:01):    No growth to date.    Culture - Blood (collected 07-27-22 @ 04:34)  Source: .Blood Blood-Peripheral  Final Report (08-01-22 @ 07:01):    No Growth Final    Culture - Blood (collected 07-27-22 @ 04:34)  Source: .Blood Blood-Peripheral  Final Report (08-01-22 @ 07:01):    No Growth Final    Culture - Fungal, Blood (collected 07-27-22 @ 04:34)  Source: .Blood Blood  Preliminary Report (07-28-22 @ 10:47):    Testing in progress           REVIEW OF SYSTEMS  --------------------------------------------------------------------------------  Gen: No weight changes, fatigue, fevers/chills, weakness  Skin: No rashes  Head/Eyes/Ears/Mouth: No headache; Normal hearing; Normal vision w/o blurriness; No sinus pain/discomfort, sore throat  Respiratory: No dyspnea, cough, wheezing, hemoptysis  CV: No chest pain, PND, orthopnea  GI: No abdominal pain, diarrhea, constipation, nausea, vomiting, melena, hematochezia  : No increased frequency, dysuria, hematuria, nocturia  MSK: No joint pain/swelling; no back pain; no edema  Neuro: No dizziness/lightheadedness, weakness, seizures, numbness, tingling  Heme: No easy bruising or bleeding  Endo: No heat/cold intolerance  Psych: No significant nervousness, anxiety, stress, depression  All other systems were reviewed and are negative, except as noted.        PHYSICAL EXAM:  Constitutional: awake, reduced alertness  Eyes: Anicteric  ENMT: nc/at  Respiratory: not tachypneic, non-labored on RA    Cardiovascular: normotensive, regular rate on monitor   Gastrointestinal: Soft, non distended, nontender, erythema at RLQ incision site with associated warmth, no fluctuance or associated lesions   Genitourinary: anuric on HD   Extremities: SCD's in place and working bilaterally  Vascular: Palpable dp pulses bilaterally  Neurological: oriented to year, place, and self; answering simple questions, but slowly   Skin: no rashes, ulcerations or lesions  Musculoskeletal: Moving all extremities  Psychiatric: Responsive     Transplant Surgery - Multidisciplinary Progress Note  --------------------------------------------------------------  DDRT     4/21/2022             Present:  Patient seen with multidisciplinary team including Transplant Surgeon: Dr. David,  Nephrologist: Dr. Mary Lomeli, Pharmacist: Marika Wayne, Dr. Gimenez ([facility]), SICU team. Disciplines not in attendance will be notified of the plan.     66M with PMH: DM type II, HTN, CAD s/p CABG in 2020, AFib on Eliquis, CVA in 2019 due to Afib, Seizure d/o following CVA, last episode was on 4/8/22, h/o GIB in 2/2022 EGD with duodenal ulcer.  ESRD due to DM was on HD since 2019.     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  Donor was 58 , KDPI 82%, DCD, single vessels and ureter, HLA mismatch 1, 2, 2. No DSA, cPRA 0%. 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 Re-admitted in May for anemia in the setting of large perinephric hematoma s/p evacuation and repair of arterial anastomosis on 5/2/22. Intra operative biopsy showed no rejection, Creatinine ranging ~2mg/dL.    In Rehab:  He was recently dx with klebsiella UTI with Ertapenem - then switched to Levaquin    He also had parainfluenza pneumonia. Was at rehab progressing well.      Hospital course:  - 6/10: transferred from rehab facility for seizure status epilepticus. Intubated for respiratory protection and transferred to MICU.  EEG showed no seizure activity. Neuro consulted.   - 6/11: Extubated. Found to have R pleural effusion. s/p Thoracentesis 1.3L. Eliquis changed to heparin drip.  Post procedure drop in H&H. 5u PRBC, 2 u FFP.   - 6/11 evening: Transferred to SICU from MICU for further care in setting of hypoxia, significant R pleural effusion, anemia and hemodynamic instability  - 6/11 Intubated at 9pm and sedated on Precedex.  CT placed, 600ml blood drained.  1L total overnight, started pressors  - 6/11 Received Protamine for PTT >100 (was on Heparin drip started for AF), 2 u FFP and 5u PRBC total  - 6/13 s/p VATS 2.2L old blood removed; second chest tube placed  - 6/18-19: chest tubes removed  - 6/21: ?PRES vs. chronic ischemic changes on MRI, off Envarsus, switched to Belatacept 6/23 with good graft function  - 6/25: Tx to SICU for refractory seizures, improved with IV antiepileptic regimen  - 7/10: Downgraded from SICU to floor.   - 7/11: OR-->URETERAL STENT REMOVED  - 7/15: ESBL Kleb UTI (50-90K), completed Ertapenem x 3 Days  - 7/25: Tx to SICU for Sepsis/ elevated LFTS  - 7/27: Shiley placed for HD  - 7/28: ongoing fevers -> started Ertapenem/Fluc  - 7/29: HD restarted + 1U PRBC.  IR bx with 2A rejection, started pulse steroids. LFTs have improved    Interval events:   - Now afebrile, hypertensive, stable bradycardia  - MS improving  - graft: anuria, fluid overload, back on HD for 2A ACR  - no other complaints or events o/n    Immunosuppression  Induction:  Thymoglobulin                                        Maintenance:  - Belatacept: 6/23, 7/4, 7/8, 7/18. 8/1, now discontinued  - Now on Sirolimus as of 8/2  - MMF 1g BID  - Pred taper   - Ongoing monitoring for signs of rejection.    Potential Discharge date: pending clinical improvement.     Education:  Medications    Plan of care:  See Below           MEDICATIONS  (STANDING):  chlorhexidine 2% Cloths 1 Application(s) Topical <User Schedule>  doxazosin 4 milliGRAM(s) Oral <User Schedule>  epoetin cortney-epbx (RETACRIT) Injectable 01727 Unit(s) SubCutaneous every 7 days  ertapenem  IVPB 500 milliGRAM(s) IV Intermittent every 24 hours  finasteride 5 milliGRAM(s) Oral daily  fluconAZOLE IVPB 200 milliGRAM(s) IV Intermittent every 24 hours  heparin   Injectable 5000 Unit(s) SubCutaneous every 8 hours  hydrALAZINE 25 milliGRAM(s) Oral every 8 hours  insulin glargine Injectable (LANTUS) 12 Unit(s) SubCutaneous at bedtime  insulin lispro (ADMELOG) corrective regimen sliding scale   SubCutaneous three times a day before meals  insulin lispro (ADMELOG) corrective regimen sliding scale   SubCutaneous at bedtime  insulin lispro Injectable (ADMELOG) 6 Unit(s) SubCutaneous three times a day before meals  levothyroxine 50 MICROGram(s) Oral daily  melatonin 6 milliGRAM(s) Oral at bedtime  mycophenolate mofetil 1000 milliGRAM(s) Oral every 12 hours  NIFEdipine XL 60 milliGRAM(s) Oral every 12 hours  pantoprazole    Tablet 40 milliGRAM(s) Oral daily  phenytoin   Capsule 100 milliGRAM(s) Oral every 8 hours  polyethylene glycol 3350 17 Gram(s) Oral daily  senna 2 Tablet(s) Oral at bedtime  sirolimus 1 milliGRAM(s) Oral <User Schedule>  sodium bicarbonate 1300 milliGRAM(s) Oral <User Schedule>    MEDICATIONS  (PRN):  acetaminophen     Tablet .. 650 milliGRAM(s) Oral every 6 hours PRN Mild Pain (1 - 3)              Vital Signs Last 24 Hrs  T(C): 36.7 (03 Aug 2022 12:00), Max: 36.7 (03 Aug 2022 12:00)  T(F): 98 (03 Aug 2022 12:00), Max: 98 (03 Aug 2022 12:00)  HR: 49 (03 Aug 2022 15:00) (42 - 50)  BP: 145/66 (03 Aug 2022 15:00) (111/77 - 168/77)  BP(mean): 95 (03 Aug 2022 15:00) (79 - 117)  RR: 22 (03 Aug 2022 15:00) (12 - 28)  SpO2: 98% (03 Aug 2022 15:00) (96% - 100%)    Parameters below as of 02 Aug 2022 23:00  Patient On (Oxygen Delivery Method): room air        I&O's Summary    02 Aug 2022 07:01  -  03 Aug 2022 07:00  --------------------------------------------------------  IN: 1110.5 mL / OUT: 0 mL / NET: 1110.5 mL                              10.2   14.06 )-----------( 446      ( 02 Aug 2022 23:18 )             31.3     08-03    130<L>  |  89<L>  |  97<H>  ----------------------------<  84  5.2   |  24  |  4.40<H>    Ca    8.5      03 Aug 2022 07:03  Phos  4.7     08-02  Mg     2.3     08-02    TPro  5.9<L>  /  Alb  2.3<L>  /  TBili  0.2  /  DBili  x   /  AST  34  /  ALT  44  /  AlkPhos  290<H>  08-02          Culture - Blood (collected 07-30-22 @ 06:19)  Source: .Blood Blood-Peripheral  Preliminary Report (07-31-22 @ 11:01):    No growth to date.    Culture - Blood (collected 07-30-22 @ 06:07)  Source: .Blood Blood-Peripheral  Preliminary Report (07-31-22 @ 11:01):    No growth to date.                     REVIEW OF SYSTEMS  --------------------------------------------------------------------------------  Gen: No weight changes, fatigue, fevers/chills, weakness  Skin: No rashes  Head/Eyes/Ears/Mouth: No headache; Normal hearing; Normal vision w/o blurriness; No sinus pain/discomfort, sore throat  Respiratory: No dyspnea, cough, wheezing, hemoptysis  CV: No chest pain, PND, orthopnea  GI: No abdominal pain, diarrhea, constipation, nausea, vomiting, melena, hematochezia  : No increased frequency, dysuria, hematuria, nocturia  MSK: No joint pain/swelling; no back pain; no edema  Neuro: No dizziness/lightheadedness, weakness, seizures, numbness, tingling  Heme: No easy bruising or bleeding  Endo: No heat/cold intolerance  Psych: No significant nervousness, anxiety, stress, depression  All other systems were reviewed and are negative, except as noted.        PHYSICAL EXAM:  Constitutional: awake, weak  Eyes: Anicteric  ENMT: nc/at  Respiratory: not tachypneic, non-labored on RA    Cardiovascular: normotensive, regular rate on monitor   Gastrointestinal: Soft, non distended, nontender, RLQ incisional scar healed  Genitourinary: anuric on HD   Extremities: SCD's in place and working bilaterally, overall with worsening edema  Vascular: Palpable dp pulses bilaterally  Neurological: oriented to year, place, and self; answering simple questions, but slowly improving  Skin: no rashes, ulcerations or lesions  Musculoskeletal: Moving all extremities  Psychiatric: Responsive

## 2022-08-03 NOTE — PROGRESS NOTE ADULT - ASSESSMENT
66M with h/o DM type II, HTN, CAD s/p CABG in 2020, Afib on Eliquis, CVA in 2019 due to Afib, Seizure d/o (last episode was on 4/8/22), h/o GI bleed in 2/2022 EGD with duodenal ulcer and ESRD on HD s/p R DDRT from DCD donor on 4/21/22 complicated by DGF requiring HD until 4/29/22 now with good graft function who presented with status epilepticus in setting of UTI/antibiotics and sub-therapeutic valproic acid level. Transferred to SICU on 7/25 with signs of sepsis. Biopsy from 7/29 demonstrating grade 1a rejection. Started pulse steroids (Solumedrol 500 on 7/30 -> 250 on 7/31). Also febrile on 7/30, but afebrile overnight.         Acute Transaminitis - unchanged, but improved from onset  - Hepatology following   - hepatotoxic medications held  - RUQ abd- Abdominal doppler - no PVT   - CT a/p non contrast- small perinephric collection   - Labs: Hepatitis panel, BK PCR, EBV PCR, CMV with PCR -> negative  - Infectious w/u: bld cxs with NGTD; urine cx with Kleb pneumo   - ID following: on Erta started 7/28 now off miguel   - FU with ID for duration of ertapenam  - On  empiric fluc   - Check IgG level -> may benefit from IVIG given ongoing fever despite broad-spectrum antibiotic coverage     R DCD DDRT 4/21/22 now with ANA  - S/P removal of ureteral stent on 7/12  - s/p HD 7/27, 7/29  - s/p IR Biopsy (7/29) - grade 2 a rejection -> Solumedrol 500 BID (7/30), 250mg (7/31&8/1), Solu 125 mg x1 today,  cellcept 1gm bid    - Continue Doxazosin/Proscar for BPH  - OOB with RN/PT  - Leukopenia: valcyte  - F/u results of DSA   - Renal US today  - Bladder scan       Immunosuppression:   - Belatacept: 6/23, 7/4, 7/8. Re-loaded 7/8,  as there was a gap between doses 2/2 seizures. 7/18,  last dose 8/1  - continue Pred 5  - PPx: Valcyte/ bactrim on hold.    Seizure Disorder:  - MRI head 6/27 with less concerns for PRES, likely ischemic changes  - Remains seizure free  - Antiepileptic regimen per Neurology, on Phenytoin 100mg TID, no further adjustment required  - Keep Mag > 2    DM  - now on insulin gtt   - Endocrine following     AFib  RIJ DVT   - Eliquis with ~40% less efficacy while on fosphenytoin.    - Lovenox held for ANA and transaminitis   - rate controlled    HTN:  - Nifedipine/Cardura/Coreg.   - dced Hydralazine (in setting of transaminitis ) 66M with h/o DM type II, HTN, CAD s/p CABG in 2020, Afib on Eliquis, CVA in 2019 due to Afib, Seizure d/o (last episode was on 4/8/22), h/o GI bleed in 2/2022 EGD with duodenal ulcer and ESRD on HD s/p R DDRT from DCD donor on 4/21/22 complicated by DGF requiring HD until 4/29/22 who presented with status epilepticus in setting of UTI/antibiotics and sub-therapeutic valproic acid level. Transferred to SICU on 7/25 with signs of sepsis.       Transaminitis  - resolved  - Hepatology following   - hepatotoxic medications held  - w/u negative so far   - ID following: on Ertapenem/Fluc until 8/4 for Kleb UTI in setting of sepsis, cxs now negative    R DCD DDRT 4/21/22 now with 2A ACR  - 2A ACR: s/p pulse steroids, now on taper  - will start HD MWF given persistent fluid overload and anuria  - graft doppler stable  - S/P removal of ureteral stent on 7/12  - Continue Doxazosin/Proscar for BPH  - OOB with RN/PT  - F/u results of DSA   - ADAT    Immunosuppression:   - Belatacept: 6/23, 7/4, 7/8. Re-loaded 7/8,  as there was a gap between doses 2/2 seizures. 7/18,  last dose 8/1  - on Sirolimus as of 8/2  - continue Pred 5  - PPx: Valcyte/ bactrim on hold.    Seizure Disorder:  - MRI head 6/27 with less concerns for PRES, likely ischemic changes  - Remains seizure free  - Antiepileptic regimen per Neurology, on Phenytoin 100mg TID, no further adjustment required  - Keep Mag > 2    DM  - steroid induced hyperglycemia, improving with Endocrine management    AFib/R IJ DVT   - Eliquis with ~40% less efficacy while on fosphenytoin.    - Lovenox held for ANA and transaminitis   - rate controlled  - will discuss A/C plans    HTN:  - Nifedipine/Cardura/Hydralazline  - off Coreg due to bradycardia    DISPO  - downgrade to floor

## 2022-08-03 NOTE — PROGRESS NOTE ADULT - SUBJECTIVE AND OBJECTIVE BOX
24 HOUR EVENTS:  - no HD today, K climbimg up to 5.9--> kayexelate and K shifted with calcium, bicarbrepeat K5.2  - restarted insulin drip for hyprerglymia, along with 20 u lantus    HISTORY:  Patient is a 65 y/o male w/ a PMHx of CAD s/p CABG, AF on Eliquis c/b CVA c/b seizures, HTN, HLD, DM type II, hypothyroidism, GI bleed due to a duodenal ulcer, and ESRD s/p DDRT 4/21/22 c/b DGF requiring HD & large perinephric hematoma 5/22 s/p evacuation w/ revision of arterial anastomosis who presented in status epilepticus 6/10 from rehab requiring intubation for airway protection with a hospital course c/b large right pleural effusion s/p thoracentesis c/b hemothorax while being anticoagulated & requiring intubation for airway protection s/p chest tube placement w/ CT demonstrating retained hemothorax s/p right VATS, status epilepticus again causing AMS requiring intubation for airway protection again, delirium, dysphagia requiring NGT placement for enteral access, hyperkalemia, and poor glycemic control. Pt transferred to floor 7/10 and readmitted to SICU 7/25 for leukopenia, transaminitis, and ANA, and AMS in the setting of sepsis. Course c/b renal failure requiring HD with kidney rejection.      SUBJECTIVE/ROS:  [ x] A ten-point review of systems was otherwise negative except as noted.  [ ] Due to altered mental status/intubation, subjective information were not able to be obtained from the patient. History was obtained, to the extent possible, from review of the chart and collateral sources of information.      NEURO  Exam: awake, alert, oriented  Meds: acetaminophen     Tablet .. 650 milliGRAM(s) Oral every 6 hours PRN Mild Pain (1 - 3)  melatonin 6 milliGRAM(s) Oral at bedtime  phenytoin   Capsule 100 milliGRAM(s) Oral every 8 hours    [x] Adequacy of sedation and pain control has been assessed and adjusted      RESPIRATORY  RR: 14 (08-02-22 @ 23:00) (11 - 20)  SpO2: 99% (08-02-22 @ 23:00) (95% - 100%)  Exam: unlabored, clear to auscultation bilaterally    [N/A] Extubation Readiness Assessed    CARDIOVASCULAR  HR: 45 (08-02-22 @ 23:00) (43 - 51)  BP: 120/59 (08-02-22 @ 23:00) (111/77 - 189/91)  BP(mean): 85 (08-02-22 @ 23:00) (79 - 130)    Exam: regular rate and rhythm  Cardiac Rhythm: sinus  Perfusion     [x]Adequate   [ ]Inadequate  Mentation   [x]Normal       [ ]Reduced  Extremities  [x]Warm         [ ]Cool  Volume Status [ ]Hypervolemic [x]Euvolemic [ ]Hypovolemic  Meds: doxazosin 4 milliGRAM(s) Oral <User Schedule>  hydrALAZINE 25 milliGRAM(s) Oral every 8 hours  NIFEdipine XL 60 milliGRAM(s) Oral every 12 hours      GI/NUTRITION  Exam: soft, nontender, nondistended, incision C/D/I  Diet: carb /renal diet  Meds: pantoprazole    Tablet 40 milliGRAM(s) Oral daily  polyethylene glycol 3350 17 Gram(s) Oral daily  senna 2 Tablet(s) Oral at bedtime      GENITOURINARY  I&O's Detail    08-01 @ 07:01  -  08-02 @ 07:00  --------------------------------------------------------  IN:    Insulin: 20 mL    Insulin: 21 mL    IV PiggyBack: 300 mL    Oral Fluid: 450 mL  Total IN: 791 mL    OUT:  Total OUT: 0 mL    Total NET: 791 mL      08-02 @ 07:01  -  08-03 @ 00:28  --------------------------------------------------------  IN:    Insulin: 36 mL    IV PiggyBack: 250 mL    Oral Fluid: 800 mL  Total IN: 1086 mL    OUT:  Total OUT: 0 mL    Total NET: 1086 mL          08-02    131<L>  |  90<L>  |  92<H>  ----------------------------<  229<H>  5.2   |  23  |  4.26<H>    Ca    8.4      02 Aug 2022 23:18  Phos  4.7     08-02  Mg     2.3     08-02    TPro  5.9<L>  /  Alb  2.3<L>  /  TBili  0.2  /  DBili  x   /  AST  34  /  ALT  44  /  AlkPhos  290<H>  08-02    [ ] Miranda catheter, indication: N/A  Meds: sodium bicarbonate 1300 milliGRAM(s) Oral <User Schedule>        HEMATOLOGIC  Meds: heparin   Injectable 5000 Unit(s) SubCutaneous every 8 hours    [x] VTE Prophylaxis                        10.2   14.06 )-----------( 446      ( 02 Aug 2022 23:18 )             31.3     PT/INR - ( 02 Aug 2022 23:18 )   PT: 13.9 sec;   INR: 1.20 ratio         PTT - ( 02 Aug 2022 23:18 )  PTT:31.3 sec  Transfusion     [ ] PRBC   [ ] Platelets   [ ] FFP   [ ] Cryoprecipitate      INFECTIOUS DISEASES  WBC Count: 14.06 K/uL (08-02 @ 23:18)    RECENT CULTURES:  Specimen Source: .Blood Blood-Peripheral  Date/Time: 07-30 @ 06:19  Culture Results:   No growth to date.  Gram Stain: --  Organism: --  Specimen Source: .Blood Blood-Peripheral  Date/Time: 07-30 @ 06:07  Culture Results:   No growth to date.  Gram Stain: --  Organism: --    Meds: epoetin cortney-epbx (RETACRIT) Injectable 19451 Unit(s) SubCutaneous every 7 days  ertapenem  IVPB 500 milliGRAM(s) IV Intermittent every 24 hours  fluconAZOLE IVPB 200 milliGRAM(s) IV Intermittent every 24 hours  mycophenolate mofetil 1000 milliGRAM(s) Oral every 12 hours  sirolimus 1 milliGRAM(s) Oral <User Schedule>        ENDOCRINE  CAPILLARY BLOOD GLUCOSE      POCT Blood Glucose.: 195 mg/dL (03 Aug 2022 00:11)  POCT Blood Glucose.: 219 mg/dL (02 Aug 2022 23:12)  POCT Blood Glucose.: 241 mg/dL (02 Aug 2022 22:24)  POCT Blood Glucose.: 261 mg/dL (02 Aug 2022 21:15)  POCT Blood Glucose.: 288 mg/dL (02 Aug 2022 20:05)  POCT Blood Glucose.: 278 mg/dL (02 Aug 2022 19:23)  POCT Blood Glucose.: 289 mg/dL (02 Aug 2022 18:54)  POCT Blood Glucose.: 268 mg/dL (02 Aug 2022 18:12)  POCT Blood Glucose.: 278 mg/dL (02 Aug 2022 17:07)  POCT Blood Glucose.: 259 mg/dL (02 Aug 2022 11:51)  POCT Blood Glucose.: 208 mg/dL (02 Aug 2022 07:50)    Meds: finasteride 5 milliGRAM(s) Oral daily  insulin glargine Injectable (LANTUS) 12 Unit(s) SubCutaneous at bedtime  insulin regular Infusion 3 Unit(s)/Hr IV Continuous <Continuous>  levothyroxine 50 MICROGram(s) Oral daily  predniSONE   Tablet 5 milliGRAM(s) Oral <User Schedule>        ACCESS DEVICES:  [ ] Peripheral IV  [ ] Central Venous Line	[ ] R	[ ] L	[ ] IJ	[ ] Fem	[ ] SC	Placed:   [ ] Arterial Line		[ ] R	[ ] L	[ ] Fem	[ ] Rad	[ ] Ax	Placed:   [ ] PICC:					[ ] Mediport  [ ] Urinary Catheter, Date Placed:   [x] Necessity of urinary, arterial, and venous catheters discussed    OTHER MEDICATIONS:  chlorhexidine 2% Cloths 1 Application(s) Topical <User Schedule>      CODE STATUS:      IMAGING:

## 2022-08-03 NOTE — PROGRESS NOTE ADULT - SUBJECTIVE AND OBJECTIVE BOX
[facility] [place] [facility]   MD NINA MASON MD, PA KRISTINE SOLTANPOUR, DO INJUNG KO, NP    TEL:  OFFICE: [phone]    From 5pm-7am Answering Service [phone]    -- RENAL FOLLOW UP NOTE ---Date of Service 08-03-22 @ 14:00    Patient is a 66y old  Male who presents with a chief complaint of Status Epilepticus (03 Aug 2022 10:20)      Patient seen and examined at bedside. No chest pain/sob    VITALS:  T(F): 98 (08-03-22 @ 12:00), Max: 98 (08-03-22 @ 12:00)  HR: 50 (08-03-22 @ 13:00)  BP: 135/63 (08-03-22 @ 13:00)  RR: 23 (08-03-22 @ 13:00)  SpO2: 96% (08-03-22 @ 13:00)  Wt(kg): --    08-02 @ 07:01  -  08-03 @ 07:00  --------------------------------------------------------  IN: 1110.5 mL / OUT: 0 mL / NET: 1110.5 mL          PHYSICAL EXAM:  Constitutional: NAD  Neck: No JVD  Respiratory: CTAB, no wheezes, rales or rhonchi  Cardiovascular: S1, S2, RRR  Gastrointestinal: BS+, soft, NT/ND  Extremities: + peripheral edema    Hospital Medications:   MEDICATIONS  (STANDING):  chlorhexidine 2% Cloths 1 Application(s) Topical <User Schedule>  doxazosin 4 milliGRAM(s) Oral <User Schedule>  epoetin cortney-epbx (RETACRIT) Injectable 20986 Unit(s) SubCutaneous every 7 days  ertapenem  IVPB 500 milliGRAM(s) IV Intermittent every 24 hours  finasteride 5 milliGRAM(s) Oral daily  fluconAZOLE IVPB 200 milliGRAM(s) IV Intermittent every 24 hours  heparin   Injectable 5000 Unit(s) SubCutaneous every 8 hours  hydrALAZINE 25 milliGRAM(s) Oral every 8 hours  insulin glargine Injectable (LANTUS) 12 Unit(s) SubCutaneous at bedtime  insulin lispro (ADMELOG) corrective regimen sliding scale   SubCutaneous three times a day before meals  insulin lispro (ADMELOG) corrective regimen sliding scale   SubCutaneous at bedtime  levothyroxine 50 MICROGram(s) Oral daily  melatonin 6 milliGRAM(s) Oral at bedtime  mycophenolate mofetil 1000 milliGRAM(s) Oral every 12 hours  NIFEdipine XL 60 milliGRAM(s) Oral every 12 hours  pantoprazole    Tablet 40 milliGRAM(s) Oral daily  phenytoin   Capsule 100 milliGRAM(s) Oral every 8 hours  polyethylene glycol 3350 17 Gram(s) Oral daily  senna 2 Tablet(s) Oral at bedtime  sirolimus 1 milliGRAM(s) Oral <User Schedule>  sodium bicarbonate 1300 milliGRAM(s) Oral <User Schedule>      LABS:  08-03    130<L>  |  89<L>  |  97<H>  ----------------------------<  84  5.2   |  24  |  4.40<H>    Ca    8.5      03 Aug 2022 07:03  Phos  4.7     08-02  Mg     2.3     08-02    TPro  5.9<L>  /  Alb  2.3<L>  /  TBili  0.2  /  DBili      /  AST  34  /  ALT  44  /  AlkPhos  290<H>  08-02    Creatinine Trend: 4.40 <--, 4.26 <--, 4.09 <--, 3.69 <--, 3.59 <--, 2.52 <--, 2.65 <--, 3.41 <--, 2.57 <--, 2.24 <--    Albumin, Serum: 2.3 g/dL (08-02 @ 23:18)  Phosphorus Level, Serum: 4.7 mg/dL (08-02 @ 23:18)  Albumin, Serum: 2.3 g/dL (08-02 @ 18:59)                              10.2   14.06 )-----------( 446      ( 02 Aug 2022 23:18 )             31.3     Urine Studies:  Urinalysis - [07-25-22 @ 18:28]      Color Yellow / Appearance Slightly Turbid / SG 1.020 / pH 5.5      Gluc Negative / Ketone Negative  / Bili Negative / Urobili Negative       Blood Small / Protein 100 / Leuk Est Large / Nitrite Negative      RBC 2 / WBC 40 / Hyaline 5 / Gran  / Sq Epi  / Non Sq Epi 0 / Bacteria Many      Iron 17, TIBC 171, %sat 10      [05-02-22 @ 13:03]  Ferritin 6336      [07-27-22 @ 13:00]  PTH -- (Ca 9.2)      [02-05-22 @ 10:13]   294  HbA1c 6.0      [02-21-20 @ 08:53]  TSH 4.69      [07-06-22 @ 18:36]  Lipid: chol --, , HDL --, LDL --      [07-27-22 @ 13:00]    HBsAg Nonreact      [07-25-22 @ 11:51]  HCV 0.18, Nonreact      [07-25-22 @ 11:51]      RADIOLOGY & ADDITIONAL STUDIES:

## 2022-08-03 NOTE — PROGRESS NOTE ADULT - ASSESSMENT
66 year old male with PMH of  DM type II, HTN, CAD s/p CABG in 2020, Afib on Eliquis, CVA in 2019 due to Afib, Seizure d/o (last episode was on 4/8/22), h/o GI bleed in 2/2022 EGD with duodenal ulcer and ESRD on HD s/p R DDRT from DCD donor on 4/21/22 complicated by DGF requiring HD until 4/29/22, later had good graft function who was readmitted with status epilepticus in setting of UTI/antibiotics and sub-therapeutic valproic acid level.  Transferred to SICU on 7/25 with signs of sepsis. Biopsy from 7/29 demonstrating grade 1a rejection. Started pulse steroids (Solumedrol 500 on 7/30 -> 250 on 7/31).     1. 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 s/p pulse dose steroids. f/u repeat DSA.   IHD today . Bladder scan to check for PVR.   2. IS meds- was on Belatacept. now switched to  Sirolimus ,  mg po bid and steroid taper.   3. AMS , seizures mentation improved significantly. On Phenytoin 100 mg q8 for seizure ppx  4. DM -  insulin gtt restarted  5. ESBL klebsiella UTI (7/15 + UCx, 7/25 +UCx)- Blood cx has been  negative, F/u Rpt blood cx . On Ertapenam until 8/3 + Fluconazole

## 2022-08-03 NOTE — PROGRESS NOTE ADULT - SUBJECTIVE AND OBJECTIVE BOX
DIABETES FOLLOW UP NOTE: Saw pt earlier today    Chief Complaint: Endocrine consult requested for management of T2DM    INTERVAL HX: Pt remains in ICU due to sepsis and transaminitis and now rejection on high steroid doses (MTP 500mg 7/30> 250mg 7/31, 8/1 and 125mg 8/2. Notee received 60mg today around 1 pm. BG levels difficult to control due to steroid taper given at different times of the day. Noted pt hyperglycemic evening and night after he received methylprednisolone dose yesterday afternoon. ICU restarted insulin drip overnight > requiring  5 to 8 units/hr with BG coming down to 70s this am. Drip off at time of visit. Also on lantus at bedtime. Per staff pt ate well for breakfast with f/u BG of 140 prior lunch and dose of steroid today. Pt c/o severe HA> RN administer pain ed right before visit.  No hypoglycemia but worsening creat and anuria> going for HD today. Also on Prednisone 5mg daily        Review of Systems:  General: As above  Cardiovascular: No chest pain, palpitations  Respiratory: No SOB, no cough  GI: No nausea, vomiting, abdominal pain  Endocrine: No S&Sx of hypoglycemia  Neuro: + HA    Allergies    No Known Allergies    Intolerances      MEDICATIONS:  ertapenem  IVPB 500 milliGRAM(s) IV Intermittent every 24 hours  finasteride 5 milliGRAM(s) Oral daily  fluconAZOLE IVPB 200 milliGRAM(s) IV Intermittent every 24 hours  insulin glargine Injectable (LANTUS) 12 Unit(s) SubCutaneous at bedtime  insulin lispro (ADMELOG) corrective regimen sliding scale   SubCutaneous three times a day before meals  insulin lispro (ADMELOG) corrective regimen sliding scale   SubCutaneous at bedtime  insulin lispro Injectable (ADMELOG) 6 Unit(s) SubCutaneous three times a day before meals  levothyroxine 50 MICROGram(s) Oral daily  sirolimus 1 milliGRAM(s) Oral <User Schedule>        PHYSICAL EXAM:  VITALS: T(C): 36.7 (08-03-22 @ 16:40)  T(F): 98.1 (08-03-22 @ 16:40), Max: 98.1 (08-03-22 @ 16:00)  HR: 51 (08-03-22 @ 17:00) (42 - 51)  BP: 150/70 (08-03-22 @ 17:00) (111/77 - 168/77)  RR:  (12 - 28)  SpO2:  (96% - 100%)  Wt(kg): --  GENERAL: Male laying in bed.  Appears uncomfortable due to HA  Abdomen: Soft, nontender, non distended  Extremities: Warm, trace edema in LEs  NEURO: Answers yes/no to simple questions      LABS:  POCT Blood Glucose.: 140 mg/dL (08-03-22 @ 12:13)  POCT Blood Glucose.: 74 mg/dL (08-03-22 @ 08:36)  POCT Blood Glucose.: 100 mg/dL (08-03-22 @ 04:21)  POCT Blood Glucose.: 99 mg/dL (08-03-22 @ 04:02)  POCT Blood Glucose.: 113 mg/dL (08-03-22 @ 03:02)  POCT Blood Glucose.: 139 mg/dL (08-03-22 @ 02:01)  POCT Blood Glucose.: 157 mg/dL (08-03-22 @ 01:13)  POCT Blood Glucose.: 195 mg/dL (08-03-22 @ 00:11)  POCT Blood Glucose.: 219 mg/dL (08-02-22 @ 23:12)  POCT Blood Glucose.: 241 mg/dL (08-02-22 @ 22:24)  POCT Blood Glucose.: 261 mg/dL (08-02-22 @ 21:15)  POCT Blood Glucose.: 288 mg/dL (08-02-22 @ 20:05)  POCT Blood Glucose.: 278 mg/dL (08-02-22 @ 19:23)  POCT Blood Glucose.: 289 mg/dL (08-02-22 @ 18:54)  POCT Blood Glucose.: 268 mg/dL (08-02-22 @ 18:12)  POCT Blood Glucose.: 278 mg/dL (08-02-22 @ 17:07)  POCT Blood Glucose.: 259 mg/dL (08-02-22 @ 11:51)  POCT Blood Glucose.: 208 mg/dL (08-02-22 @ 07:50)  POCT Blood Glucose.: 149 mg/dL (08-01-22 @ 22:02)  POCT Blood Glucose.: 115 mg/dL (08-01-22 @ 21:19)  POCT Blood Glucose.: 94 mg/dL (08-01-22 @ 21:00)  POCT Blood Glucose.: 114 mg/dL (08-01-22 @ 20:02)  POCT Blood Glucose.: 131 mg/dL (08-01-22 @ 19:12)  POCT Blood Glucose.: 126 mg/dL (08-01-22 @ 18:14)  POCT Blood Glucose.: 125 mg/dL (08-01-22 @ 17:25)  POCT Blood Glucose.: 135 mg/dL (08-01-22 @ 16:36)  POCT Blood Glucose.: 161 mg/dL (08-01-22 @ 15:31)  POCT Blood Glucose.: 140 mg/dL (08-01-22 @ 14:28)  POCT Blood Glucose.: 132 mg/dL (08-01-22 @ 13:25)  POCT Blood Glucose.: 124 mg/dL (08-01-22 @ 12:26)  POCT Blood Glucose.: 125 mg/dL (08-01-22 @ 11:22)  POCT Blood Glucose.: 133 mg/dL (08-01-22 @ 10:15)  POCT Blood Glucose.: 133 mg/dL (08-01-22 @ 09:08)  POCT Blood Glucose.: 142 mg/dL (08-01-22 @ 09:05)  POCT Blood Glucose.: 173 mg/dL (08-01-22 @ 07:55)  POCT Blood Glucose.: 190 mg/dL (08-01-22 @ 06:53)  POCT Blood Glucose.: 194 mg/dL (08-01-22 @ 06:11)  POCT Blood Glucose.: 226 mg/dL (08-01-22 @ 05:08)  POCT Blood Glucose.: 257 mg/dL (08-01-22 @ 04:07)  POCT Blood Glucose.: 288 mg/dL (08-01-22 @ 03:01)  POCT Blood Glucose.: 301 mg/dL (08-01-22 @ 02:06)  POCT Blood Glucose.: 354 mg/dL (08-01-22 @ 00:06)  POCT Blood Glucose.: 402 mg/dL (07-31-22 @ 22:36)  POCT Blood Glucose.: 448 mg/dL (07-31-22 @ 20:25)                            10.2   14.06 )-----------( 446      ( 02 Aug 2022 23:18 )             31.3       08-03    130<L>  |  89<L>  |  97<H>  ----------------------------<  84  5.2   |  24  |  4.40<H>    eGFR: 14<L>    Ca    8.5      08-03  Mg     2.3     08-02  Phos  4.7     08-02    TPro  5.9<L>  /  Alb  2.3<L>  /  TBili  0.2  /  DBili  x   /  AST  34  /  ALT  44  /  AlkPhos  290<H>  08-02      Thyroid Function Tests:  07-06 @ 18:36 TSH 4.69 FreeT4 -- T3 43 Anti TPO -- Anti Thyroglobulin Ab -- TSI --      A1C with Estimated Average Glucose Result: 6.0 % (06-30-22 @ 05:49)      Estimated Average Glucose: 126 mg/dL (06-30-22 @ 05:49)        07-27 Chol -- Direct LDL -- LDL calculated -- HDL -- Trig 118               DIABETES FOLLOW UP NOTE: Saw pt earlier today    Chief Complaint: Endocrine consult requested for management of T2DM    INTERVAL HX: Pt remains in ICU due to sepsis and transaminitis and now rejection on high steroid doses (MTP 500mg 7/30> 250mg 7/31, 8/1 and 125mg 8/2. Notee received 60mg today around 1 pm. BG levels difficult to control due to steroid taper given at different times of the day. Noted pt hyperglycemic evening and night after he received methylprednisolone dose yesterday afternoon. ICU restarted insulin drip overnight > requiring  5 to 8 units/hr with BG coming down to 70s this am. Drip off at time of visit. Also on lantus at bedtime. Per staff pt ate well for breakfast with f/u BG of 140 prior lunch and dose of steroid today. Pt c/o severe HA> RN administer pain ed right before visit.  No hypoglycemia but worsening creat and anuria> going for HD today. Also noted Prednisone taper to start tomorrow        Review of Systems:  General: As above  Cardiovascular: No chest pain, palpitations  Respiratory: No SOB, no cough  GI: No nausea, vomiting, abdominal pain  Endocrine: No S&Sx of hypoglycemia  Neuro: + HA    Allergies    No Known Allergies    Intolerances      MEDICATIONS:  ertapenem  IVPB 500 milliGRAM(s) IV Intermittent every 24 hours  finasteride 5 milliGRAM(s) Oral daily  fluconAZOLE IVPB 200 milliGRAM(s) IV Intermittent every 24 hours  insulin glargine Injectable (LANTUS) 12 Unit(s) SubCutaneous at bedtime  insulin lispro (ADMELOG) corrective regimen sliding scale   SubCutaneous three times a day before meals  insulin lispro (ADMELOG) corrective regimen sliding scale   SubCutaneous at bedtime  insulin lispro Injectable (ADMELOG) 6 Unit(s) SubCutaneous three times a day before meals  levothyroxine 50 MICROGram(s) Oral daily  sirolimus 1 milliGRAM(s) Oral <User Schedule>        PHYSICAL EXAM:  VITALS: T(C): 36.7 (08-03-22 @ 16:40)  T(F): 98.1 (08-03-22 @ 16:40), Max: 98.1 (08-03-22 @ 16:00)  HR: 51 (08-03-22 @ 17:00) (42 - 51)  BP: 150/70 (08-03-22 @ 17:00) (111/77 - 168/77)  RR:  (12 - 28)  SpO2:  (96% - 100%)  Wt(kg): --  GENERAL: Male laying in bed.  Appears uncomfortable due to HA  Abdomen: Soft, nontender, non distended  Extremities: Warm, trace edema in LEs  NEURO: Answers yes/no to simple questions      LABS:  POCT Blood Glucose.: 140 mg/dL (08-03-22 @ 12:13)  POCT Blood Glucose.: 74 mg/dL (08-03-22 @ 08:36)  POCT Blood Glucose.: 100 mg/dL (08-03-22 @ 04:21)  POCT Blood Glucose.: 99 mg/dL (08-03-22 @ 04:02)  POCT Blood Glucose.: 113 mg/dL (08-03-22 @ 03:02)  POCT Blood Glucose.: 139 mg/dL (08-03-22 @ 02:01)  POCT Blood Glucose.: 157 mg/dL (08-03-22 @ 01:13)  POCT Blood Glucose.: 195 mg/dL (08-03-22 @ 00:11)  POCT Blood Glucose.: 219 mg/dL (08-02-22 @ 23:12)  POCT Blood Glucose.: 241 mg/dL (08-02-22 @ 22:24)  POCT Blood Glucose.: 261 mg/dL (08-02-22 @ 21:15)  POCT Blood Glucose.: 288 mg/dL (08-02-22 @ 20:05)  POCT Blood Glucose.: 278 mg/dL (08-02-22 @ 19:23)  POCT Blood Glucose.: 289 mg/dL (08-02-22 @ 18:54)  POCT Blood Glucose.: 268 mg/dL (08-02-22 @ 18:12)  POCT Blood Glucose.: 278 mg/dL (08-02-22 @ 17:07)  POCT Blood Glucose.: 259 mg/dL (08-02-22 @ 11:51)  POCT Blood Glucose.: 208 mg/dL (08-02-22 @ 07:50)  POCT Blood Glucose.: 149 mg/dL (08-01-22 @ 22:02)  POCT Blood Glucose.: 115 mg/dL (08-01-22 @ 21:19)  POCT Blood Glucose.: 94 mg/dL (08-01-22 @ 21:00)  POCT Blood Glucose.: 114 mg/dL (08-01-22 @ 20:02)  POCT Blood Glucose.: 131 mg/dL (08-01-22 @ 19:12)  POCT Blood Glucose.: 126 mg/dL (08-01-22 @ 18:14)  POCT Blood Glucose.: 125 mg/dL (08-01-22 @ 17:25)  POCT Blood Glucose.: 135 mg/dL (08-01-22 @ 16:36)  POCT Blood Glucose.: 161 mg/dL (08-01-22 @ 15:31)  POCT Blood Glucose.: 140 mg/dL (08-01-22 @ 14:28)  POCT Blood Glucose.: 132 mg/dL (08-01-22 @ 13:25)  POCT Blood Glucose.: 124 mg/dL (08-01-22 @ 12:26)  POCT Blood Glucose.: 125 mg/dL (08-01-22 @ 11:22)  POCT Blood Glucose.: 133 mg/dL (08-01-22 @ 10:15)  POCT Blood Glucose.: 133 mg/dL (08-01-22 @ 09:08)  POCT Blood Glucose.: 142 mg/dL (08-01-22 @ 09:05)  POCT Blood Glucose.: 173 mg/dL (08-01-22 @ 07:55)  POCT Blood Glucose.: 190 mg/dL (08-01-22 @ 06:53)  POCT Blood Glucose.: 194 mg/dL (08-01-22 @ 06:11)  POCT Blood Glucose.: 226 mg/dL (08-01-22 @ 05:08)  POCT Blood Glucose.: 257 mg/dL (08-01-22 @ 04:07)  POCT Blood Glucose.: 288 mg/dL (08-01-22 @ 03:01)  POCT Blood Glucose.: 301 mg/dL (08-01-22 @ 02:06)  POCT Blood Glucose.: 354 mg/dL (08-01-22 @ 00:06)  POCT Blood Glucose.: 402 mg/dL (07-31-22 @ 22:36)  POCT Blood Glucose.: 448 mg/dL (07-31-22 @ 20:25)                            10.2   14.06 )-----------( 446      ( 02 Aug 2022 23:18 )             31.3       08-03    130<L>  |  89<L>  |  97<H>  ----------------------------<  84  5.2   |  24  |  4.40<H>    eGFR: 14<L>    Ca    8.5      08-03  Mg     2.3     08-02  Phos  4.7     08-02    TPro  5.9<L>  /  Alb  2.3<L>  /  TBili  0.2  /  DBili  x   /  AST  34  /  ALT  44  /  AlkPhos  290<H>  08-02      Thyroid Function Tests:  07-06 @ 18:36 TSH 4.69 FreeT4 -- T3 43 Anti TPO -- Anti Thyroglobulin Ab -- TSI --      A1C with Estimated Average Glucose Result: 6.0 % (06-30-22 @ 05:49)      Estimated Average Glucose: 126 mg/dL (06-30-22 @ 05:49)        07-27 Chol -- Direct LDL -- LDL calculated -- HDL -- Trig 118

## 2022-08-03 NOTE — PROGRESS NOTE ADULT - NS ATTEST RISK PROBLEM GEN_ALL_CORE FT
Acute kidney rejection  Bradycardia  UTI (on immunosuppression).
Acute kidney rejection  Bradycardia  UTI (on immunosuppression).

## 2022-08-03 NOTE — PROGRESS NOTE ADULT - ASSESSMENT
67 y/o male w/ a PMHx of CAD s/p CABG, AF on Eliquis c/b CVA c/b seizures, HTN, HLD, DM type II, hypothyroidism, GI bleed due to a duodenal ulcer, and ESRD s/p DDRT 4/21/22 c/b DGF requiring HD & large perinephric hematoma 5/22 s/p evacuation w/ revision of arterial anastomosis who presented in status epilepticus 6/10 from rehab requiring intubation for airway protection with a hospital course c/b large right pleural effusion s/p thoracentesis c/b hemothorax while being anticoagulated & requiring intubation for airway protection s/p chest tube placement w/ CT demonstrating retained hemothorax s/p right VATS, status epilepticus again causing AMS requiring intubation for airway protection again, delirium, dysphagia requiring NGT placement for enteral access, hyperkalemia, and poor glycemic control. Pt transferred to floor 7/10 and readmitted to SICU 7/25 for AMS, found to have ESBL klebsiella UTI, ANA, with renal rejection, and renal failure requiring HD.    Neuro: encephalopathy improved, hx of seizure  - Phenytoin 100 q8 for seizure ppx  - Appreciate neuro recs, no need to repeat levels at this time  - Multimodal pain control w/Tylenol  - Melatonin + protected sleep to improve delirium      Resp: no acute issues  - Out of bed to chair, ambulate as tolerated, and incentive spirometry to prevent atelectasis    CVS: hx of HTN, s/p CABG, AF on Eliquis  - Nifedipine 60 mg qd  - Coreg on hold as patient is bradycardic  -start hydralazine 25mg q8h with holding SBP >130    GI: transaminitis  - Regular diet, soft and bite sized   - Transaminitis continues to improve, continue holding hydralazine, lipitor, bactrim  - Bowel regimen with Miralax, holding senna due to multiple bowel movements overnight    Renal: DDRT 4/21 cb DGF, now w cf acute rejection, s/p IR renal bx 7/29,  - Crt elevated, K elevated shifted with calcium, bicrab and kayexelate, while waiting for HD libby am  - anuric, no tucker  - HD last 7/30, PRN per Transplant nephro   - F/u IR renal bx pathology  - Monitor I&Os and electrolytes  - replete electrolytes as needed   - Doxazosin + Finasteride  - IV locked  - Last HD saturday 7/30 with 1.5L fluid removal through left EJ HD cath (placed by IR)    Heme:  - Monitor CBC and coags  - SCDs  - VTE ppx: heparin SC    ID: ESBL klebsiella UTI (7/15 + UCx, 7/25 +UCx)  - Blood cx negative 7/25, 7/27  - F/u Rpt blood cx   - Febrile w Tmax 38.6 on 7/30  - Monitor WBC, temperature, and procalcitonin  - Erbapenam until 8/3 + Fluconazole  -Immunosuppresion per transplant nephro     Endo: hx of DM, with Hyperglycemia,  Hypothyroidism,   - High dose Solumedrol in setting of possible acute graft rejection   - currently weaned off insulin drip after   - Lantus 10,u ISS  - Home Synthroid   65 y/o male w/ a PMHx of CAD s/p CABG, AF on Eliquis c/b CVA c/b seizures, HTN, HLD, DM type II, hypothyroidism, GI bleed due to a duodenal ulcer, and ESRD s/p DDRT 4/21/22 c/b DGF requiring HD & large perinephric hematoma 5/22 s/p evacuation w/ revision of arterial anastomosis who presented in status epilepticus 6/10 from rehab requiring intubation for airway protection with a hospital course c/b large right pleural effusion s/p thoracentesis c/b hemothorax while being anticoagulated & requiring intubation for airway protection s/p chest tube placement w/ CT demonstrating retained hemothorax s/p right VATS, status epilepticus again causing AMS requiring intubation for airway protection again, delirium, dysphagia requiring NGT placement for enteral access, hyperkalemia, and poor glycemic control. Pt transferred to floor 7/10 and readmitted to SICU 7/25 for AMS, found to have ESBL klebsiella UTI, ANA, with renal rejection, and renal failure requiring HD.    Neuro: encephalopathy improved, hx of seizure  - Phenytoin 100 q8 for seizure ppx  - Appreciate neuro recs, no need to repeat levels at this time  - Multimodal pain control w/Tylenol  - Melatonin + protected sleep to improve delirium      Resp: no acute issues  - Out of bed to chair, ambulate as tolerated, and incentive spirometry to prevent atelectasis    CVS: hx of HTN, s/p CABG, AF on Eliquis  - Nifedipine 60 mg qd  - Coreg on hold as patient is bradycardic  -start hydralazine 25mg q8h with holding SBP >130    GI: transaminitis  - Regular diet, soft and bite sized   - Transaminitis continues to improve, continue holding hydralazine, lipitor, bactrim  - Bowel regimen with Miralax, holding senna due to multiple bowel movements overnight    Renal: DDRT 4/21 cb DGF, now w cf acute rejection, s/p IR renal bx 7/29,  - Crt elevated, K elevated shifted with calcium, bicrab and kayexelate, while waiting for HD libby am  - anuric, no tucker  - HD last 7/30, PRN per Transplant nephro   - F/u IR renal bx pathology  - Monitor I&Os and electrolytes  - replete electrolytes as needed   - Doxazosin + Finasteride  - IV locked  - Last HD saturday 7/30 with 1.5L fluid removal through left EJ HD cath (placed by IR)    Heme:  - Monitor CBC and coags  - SCDs  - VTE ppx: heparin SC    ID: ESBL klebsiella UTI (7/15 + UCx, 7/25 +UCx)  - Blood cx negative 7/25, 7/27  - F/u Rpt blood cx   - Febrile w Tmax 38.6 on 7/30  - Monitor WBC, temperature, and procalcitonin  - Erbapenam until 8/3 + Fluconazole  -Immunosuppresion per transplant nephro     Endo: hx of DM, with Hyperglycemia,  Hypothyroidism,   - High dose Solumedrol in setting of possible acute graft rejection   - currently weaning insulin drip  - Lantus 20,u ISS  - Home Synthroid   67 y/o male w/ a PMHx of CAD s/p CABG, AF on Eliquis c/b CVA c/b seizures, HTN, HLD, DM type II, hypothyroidism, GI bleed due to a duodenal ulcer, and ESRD s/p DDRT 4/21/22 c/b DGF requiring HD & large perinephric hematoma 5/22 s/p evacuation w/ revision of arterial anastomosis who presented in status epilepticus 6/10 from rehab requiring intubation for airway protection with a hospital course c/b large right pleural effusion s/p thoracentesis c/b hemothorax while being anticoagulated & requiring intubation for airway protection s/p chest tube placement w/ CT demonstrating retained hemothorax s/p right VATS, status epilepticus again causing AMS requiring intubation for airway protection again, delirium, dysphagia requiring NGT placement for enteral access, hyperkalemia, and poor glycemic control. Pt transferred to floor 7/10 and readmitted to SICU 7/25 for AMS, found to have ESBL klebsiella UTI, ANA, with renal rejection, and renal failure requiring HD.    Neuro: encephalopathy improved, hx of seizure  - Phenytoin 100 q8 for seizure ppx  - Appreciate neuro recs, no need to repeat levels at this time  - Multimodal pain control w/Tylenol  - Melatonin + protected sleep to improve delirium      Resp: no acute issues  - Out of bed to chair, ambulate as tolerated, and incentive spirometry to prevent atelectasis    CVS: hx of HTN, s/p CABG, AF on Eliquis  - Nifedipine 60 mg qd  - Coreg on hold as patient is bradycardic  -start hydralazine 25mg q8h with holding SBP >130    GI: transaminitis  - Regular diet, soft and bite sized   - Transaminitis continues to improve  - Bowel regimen with Miralax,  senna       Renal: DDRT 4/21 cb DGF, now w cf acute rejection, s/p IR renal bx 7/29,  - Crt elevated, K elevated shifted with calcium, bicrab and kayexelate, while waiting for HD libby am  -sirolimus  - anuric, no tucker  - Monitor I&Os and electrolytes  - replete electrolytes as needed   - Doxazosin + Finasteride  - Last HD saturday 7/30 with 1.5L fluid removal through left EJ HD cath (placed by IR)    Heme:  - Monitor CBC and coags  - SCDs  - VTE ppx: heparin SC    ID: ESBL klebsiella UTI (7/15 + UCx, 7/25 +UCx)  - Blood cx negative 7/25, 7/27  - F/u Rpt blood cx   - Febrile w Tmax 38.6 on 7/30  - Monitor WBC, temperature, and procalcitonin  - Ertapenam until 8/3 + Fluconazole  -Immunosuppresion per transplant nephro     Endo: hx of DM, with Hyperglycemia,  Hypothyroidism,   - High dose Solumedrol in setting of possible acute graft rejection   - off insulin drip this am  - Lantus 20,u ISS  - Home Synthroid   65 y/o male w/ a PMHx of CAD s/p CABG, AF on Eliquis c/b CVA c/b seizures, HTN, HLD, DM type II, hypothyroidism, GI bleed due to a duodenal ulcer, and ESRD s/p DDRT 4/21/22 c/b DGF requiring HD & large perinephric hematoma 5/22 s/p evacuation w/ revision of arterial anastomosis who presented in status epilepticus 6/10 from rehab requiring intubation for airway protection with a hospital course c/b large right pleural effusion s/p thoracentesis c/b hemothorax while being anticoagulated & requiring intubation for airway protection s/p chest tube placement w/ CT demonstrating retained hemothorax s/p right VATS, status epilepticus again causing AMS requiring intubation for airway protection again, delirium, dysphagia requiring NGT placement for enteral access, hyperkalemia, and poor glycemic control. Pt transferred to floor 7/10 and readmitted to SICU 7/25 for AMS, found to have ESBL klebsiella UTI, ANA, with renal rejection, and renal failure requiring HD.    Neuro: encephalopathy improved, hx of seizure  - Phenytoin 100 q8 for seizure ppx  - Appreciate neuro recs, no need to repeat levels at this time  - Multimodal pain control w/Tylenol  - Melatonin + protected sleep to improve delirium      Resp: no acute issues  - Out of bed to chair, ambulate as tolerated, and incentive spirometry to prevent atelectasis    CVS: hx of HTN, s/p CABG, AF on Eliquis  - Nifedipine 60 mg qd  - Coreg on hold as patient is bradycardic  -start hydralazine 25mg q8h with holding SBP >130    GI: transaminitis  - Regular diet, soft and bite sized   - Transaminitis continues to improve  - Bowel regimen with Miralax,  senna       Renal: DDRT 4/21 cb DGF, now w cf acute rejection, s/p IR renal bx 7/29,  - Crt elevated, K elevated shifted with calcium, bicrab and kayexelate  -HD today   -sirolimus  - anuric, no tucker  - Monitor I&Os and electrolytes  - replete electrolytes as needed   - Doxazosin + Finasteride  - Last HD saturday 7/30 with 1.5L fluid removal through left EJ HD cath (placed by IR)    Heme:  - Monitor CBC and coags  - SCDs  - VTE ppx: heparin SC    ID: ESBL klebsiella UTI (7/15 + UCx, 7/25 +UCx)  - Blood cx negative 7/25, 7/27  - F/u Rpt blood cx   - Febrile w Tmax 38.6 on 7/30  - Monitor WBC, temperature, and procalcitonin  - Ertapenam until 8/3 + Fluconazole  -Immunosuppresion per transplant nephro     Endo: hx of DM, with Hyperglycemia,  Hypothyroidism,   - High dose Solumedrol in setting of possible acute graft rejection   - off insulin drip this am  - Lantus 20,u ISS  - Home Synthroid    Dispo: listed for floor   67 y/o male w/ a PMHx of CAD s/p CABG, AF on Eliquis c/b CVA c/b seizures, HTN, HLD, DM type II, hypothyroidism, GI bleed due to a duodenal ulcer, and ESRD s/p DDRT 4/21/22 c/b DGF requiring HD & large perinephric hematoma 5/22 s/p evacuation w/ revision of arterial anastomosis who presented in status epilepticus 6/10 from rehab requiring intubation for airway protection with a hospital course c/b large right pleural effusion s/p thoracentesis c/b hemothorax while being anticoagulated & requiring intubation for airway protection s/p chest tube placement w/ CT demonstrating retained hemothorax s/p right VATS, status epilepticus again causing AMS requiring intubation for airway protection again, delirium, dysphagia requiring NGT placement for enteral access, hyperkalemia, and poor glycemic control. Pt transferred to floor 7/10 and readmitted to SICU 7/25 for AMS, found to have ESBL klebsiella UTI, ANA, with renal rejection, and renal failure requiring HD.    Neuro: encephalopathy improved, hx of seizure  - Phenytoin 100 q8 for seizure ppx  - Appreciate neuro recs, no need to repeat levels at this time  - Multimodal pain control w/Tylenol  - Melatonin + protected sleep to improve delirium    Resp: no acute issues  - Out of bed to chair, ambulate as tolerated, and incentive spirometry to prevent atelectasis    CVS: hx of HTN, s/p CABG, AF on Eliquis  - Nifedipine 60 mg qd  - Coreg on hold as patient is bradycardic  -start hydralazine 25mg q8h with holding SBP >130    GI: transaminitis  - Regular diet, soft and bite sized   - Transaminitis continues to improve  - Bowel regimen with Miralax,  senna     Renal: DDRT 4/21 cb DGF, now w cf acute rejection, s/p IR renal bx 7/29,  - Crt elevated, K elevated shifted with calcium, bicrab and kayexelate  -HD today   -sirolimus  - anuric, no tucker  - Monitor I&Os and electrolytes  - replete electrolytes as needed   - Doxazosin + Finasteride  - Last HD saturday 7/30, HD today (8/3) now on MW schedule    Heme:  - Monitor CBC and coags  - SCDs  - VTE ppx: heparin SC    ID: ESBL klebsiella UTI (7/15 + UCx, 7/25 +UCx)  - Blood cx negative 7/25, 7/27  - F/u Rpt blood cx   - Febrile w Tmax 38.6 on 7/30  - Monitor WBC, temperature, and procalcitonin  - Ertapenam until 8/3 + Fluconazole  -Immunosuppresion per transplant nephro     Endo: hx of DM, with Hyperglycemia,  Hypothyroidism,   - High dose Solumedrol in setting of possible acute graft rejection   - off insulin drip this am  - Latnus 12U at bedtime, Ademlog 6U before meals, Lantus 20u ISS  - Home Synthroid    Dispo: listed for floor

## 2022-08-03 NOTE — PROGRESS NOTE ADULT - ASSESSMENT
66 yr old M with Type 2 DM> unknown control due to skewed A1C 6.6% secondary to chronic anemia and s/p blood tx. On basal/bolus insulin therapy PTA. DM c/b ESRD s/p DDRT on 3/21, CVA, CAD s/p CABG, Afib on apixaban, seizure activity, HTN, HLD, h/o GI bleed in 2/2022 EGD with duodenal ulcer, discharged to [facility] rehab center after prior hospitalization, now re-admitted from [facility] for seizures c/f status epilepticus in setting of UTI. endocrine consulted for steroid induced hyperglycemia, remains home dose prednisone 5mg. Prolonged hospital course w/ ICU stay, previously intubated/extubated, previous seizures. S/p ureteral stent removal 7/12/22 pt is now on steroid pulse doses due to rejection with anuria and worsening creat> steroids been tapered down daily and pt having HD today. BG difficult to control due to rapid changes on steroid doses. Hyperglycemia noted after steroid is given but then BG coming down fast as steroid wears off. Spoke to ICU team about the need to keep pt on insulin drip versus SQ basal/bolus and not in both in order to prevent hypoglycemia. Team wants to keep pt on SQ therapy for now. Pt  Tolerating POs so recommended to add premeal insulin doses and c/w basal/bolus therapy including correction insulin scales ac/hs and 2am to correct hyperglycemic excursions due to steroid doses. No hypoglycemia.  BG goal (100-180mg/dl).

## 2022-08-03 NOTE — PROGRESS NOTE ADULT - ATTENDING COMMENTS
Pt is a 66 year old male with a medical history signficant for CAD (s/p CABG), AF (on Eliquis c/b CVA c/b seizures), HTN, HLD, DM type II, hypothyroidism, GI bleed due to a duodenal ulcer, and ESRD (s/p DDRT 4/21/22 c/b DGF requiring HD) with large perinephric hematoma (s/p evacuation w/ revision of arterial anastomosis) who presented in status epilepticus. Pt was intubated for airway protection. Pt transferred to floor 7/10 and readmitted to SICU 7/25 for AMS due to an ESBL klebsiella UTI, ANA/renal rejection, and renal failure requiring HD. Pt continues to have bradycardia. No acute events overnight.    encephalopathy improved  hx of seizures  Continue dilantin and multimodal pain control  Melatonin + protected sleep to improve delirium    Atelectasis  Continue ISP    HTN/CAD, H/o CABG  AFib on eliquis  Continue nifedipine  Continue hydralazine  Hold coreg for bradycardia    Improving transaminitis  Continue to monitor  Reg diet    S/p DDRT 4/21 cb DGF, now cb acute rejection, s/p IR renal bx 7/29  Continue pulse steroids  Monitor I&Os and electrolytes  Nephrology f/u for HD    Continue SQ heparin    SBL klebsiella UTI (7/15 + UCx, 7/25 +UCx)  Cont Ertapenam until 8/3 + Fluconazole  Immunosuppresion per transplant nephro     DM2  Lantus adjusted per endocrinology.

## 2022-08-03 NOTE — PROGRESS NOTE ADULT - ASSESSMENT
66 yr old Male with PMHx ESRD s/p permacath removal 5/10/22 s/p Rt DDRT 4/21/22,  CVA (2019), Afib on apixaban, seizure activity, CAD s/p stents, CABG (2020), HTN, HLD, DM on insulin, gastric/duodenal ulcer, recent hospitalization 4/28/22 -5/10/22 with weakness/anemia found to have perinephric hematoma requiring evacuation and repair of bleeding arterial anastomosis. Curently being treated for UTI with Levaquin Today after developing multiple sz episodes refractory to 5 mg versed IM (EM[facility]) and 2 mg ativan IV in E.D. concerning for status epilepticus requiring intubation for hypoxic respiratory  failure. MICU Consult was called for hypoxic respiratory failure secondary to status epilepticus.  Nephrology consulted for renal failure.     A/P  DDRT on 04/21/22  Course complicated by DGF, was on HD until 4/29/22. Readmitted in May for anemia in the setting of large perinephric hematoma s/p evacuation and repair of arterial anastomosis on 5/02/22. Intra operative biopsy showed no rejection.  ANA was initially sec to  hemodynamic change   stent removal 07/12/22  No hydronephrosis noted on renal US. UA done on 7/25/22 showed LE and pyuria.   allograft US 07/28/22 shows Interval increase in the resistive indices and peak systolic velocities   compared to prior study performed yesterday. Short-term imaging follow-up   if clinically indicated.  Grade 1a rejection (biopsy on 7/29) - s/p pulse dose steroids  f/u repeat DSA.   continue Immunosuppression per transplant team  last HD 07/30/22  planning for HD today ;  HD per transplant team   transplant team on board   monitor BMP, U/O     HTN   optimal   manage by transplant team    monitor BP closely

## 2022-08-04 NOTE — CHART NOTE - NSCHARTNOTEFT_GEN_A_CORE
Patient noted to have seizure like activity with convulsing of the Right Upper Extremity lasting 30-45 seconds, pt A,O x2-3 and following command after seizure like activity. Labs significant for WBC 24 ( uptrend from 14), SBP low 200's s/p Hydralazine 10mg IV with improvement to 's. Neuro and Transplant team were contacted, Blood Cx x2 and EEG started. Unable to send UA/UCx 2/2 significant penile edema. Pt became aggressive and agitated s/p Haldol 2mg IV x1 as d/w Transplant team.     Around 1am, Pt had a second focal seizure with right sided facial twitching and convulsions of the right upper extremity for about 45 seconds s/p Versed 4mg IV as d/w Neuro. Pt noted to be post ictal, not following commands, and with worsening Right sided weakness ( tone and strength 3/5). Seizure activity not caught on EEG as it was being installed. Noncontrast Head CT performed with preliminary report negative for intracranial hemorrhage, SBP persistently in 190's s/p Hydral 10mg IV x2.       Around 5:30 AM, patient had a third focal seizure (lasting ~3 mins) w/ notable facial twitching, Right upper extremity, and right lower extremity convulsing. Neuro and Transplant team informed. Neuro team recommended to increase phenytoin 100mg TID form to Fosphenytoin 200mg IVPB BID, as pt is unable to take PO meds at this time. (Phenytoin level 4.8 this AM). In addition, will add Fycompa to regimen per Neuro Reccs. -190's s/p Hydral 10mg IV x1 with  Patient is currently hemodynamically stable, answering yes/no questions, but is confused and unable to speak in complete sentences therefore unable to assess mental status accurately.     For leukocytosis, Ertapenem was discontinued and was not switched to meropenem since meropenem decreases threshold for seizures more so than ertapenem as per neuro.     Primary team discussed overnight events with pt.'s son, Dr. Chayito Castelan over phone      Overnight events d/w Dr. Hunter, SICU attending aware.

## 2022-08-04 NOTE — PROGRESS NOTE ADULT - PROBLEM SELECTOR PLAN 4
- On atorvastatin 40 mg as outpatient - On atorvastatin 40 mg as outpatient    Thank you for the consult. We will continue to follow. Discussed the above plan with the primary team.     Josefina Martin MD  Department of Endocrinology, diabetes and metabolism   Pager [phone]

## 2022-08-04 NOTE — PROVIDER CONTACT NOTE (CHANGE IN STATUS NOTIFICATION) - SITUATION
Patient has increased lethargy compared to sign out report
Pt alterations in mental status. Received 5mg Oxy and 0.25 Dilaudid.
pt having seizure activity

## 2022-08-04 NOTE — PROGRESS NOTE ADULT - SUBJECTIVE AND OBJECTIVE BOX
SICU Daily Progress Note  =====================================================  Interval/Overnight Events:     - multiple episodes of seizures  - Neurology consulted  - Patient put on EEG  - Haldol 2mg    HPI:    Allergies: No Known Allergies      MEDICATIONS:   --------------------------------------------------------------------------------------  Neurologic Medications  acetaminophen     Tablet .. 650 milliGRAM(s) Oral every 6 hours PRN Mild Pain (1 - 3)  haloperidol    Injectable 2 milliGRAM(s) IntraMuscular once  melatonin 6 milliGRAM(s) Oral at bedtime  phenytoin   Capsule 100 milliGRAM(s) Oral every 8 hours    Respiratory Medications    Cardiovascular Medications  doxazosin 4 milliGRAM(s) Oral <User Schedule>  hydrALAZINE 25 milliGRAM(s) Oral every 8 hours  NIFEdipine XL 60 milliGRAM(s) Oral every 12 hours    Gastrointestinal Medications  pantoprazole    Tablet 40 milliGRAM(s) Oral daily  polyethylene glycol 3350 17 Gram(s) Oral daily  senna 2 Tablet(s) Oral at bedtime  sodium bicarbonate 1300 milliGRAM(s) Oral <User Schedule>    Genitourinary Medications    Hematologic/Oncologic Medications  epoetin cortney-epbx (RETACRIT) Injectable 43762 Unit(s) SubCutaneous every 7 days  heparin   Injectable 5000 Unit(s) SubCutaneous every 8 hours  mycophenolate mofetil 1000 milliGRAM(s) Oral every 12 hours  sirolimus 1 milliGRAM(s) Oral <User Schedule>    Antimicrobial/Immunologic Medications  ertapenem  IVPB 500 milliGRAM(s) IV Intermittent every 24 hours  fluconAZOLE IVPB 200 milliGRAM(s) IV Intermittent every 24 hours    Endocrine/Metabolic Medications  finasteride 5 milliGRAM(s) Oral daily  insulin glargine Injectable (LANTUS) 12 Unit(s) SubCutaneous at bedtime  insulin lispro (ADMELOG) corrective regimen sliding scale   SubCutaneous <User Schedule>  insulin lispro (ADMELOG) corrective regimen sliding scale   SubCutaneous three times a day before meals  insulin lispro (ADMELOG) corrective regimen sliding scale   SubCutaneous at bedtime  insulin lispro Injectable (ADMELOG) 6 Unit(s) SubCutaneous three times a day before meals  levothyroxine 50 MICROGram(s) Oral daily  predniSONE   Tablet 40 milliGRAM(s) Oral once    Topical/Other Medications  chlorhexidine 2% Cloths 1 Application(s) Topical <User Schedule>    --------------------------------------------------------------------------------------    VITAL SIGNS, INS/OUTS (last 24 hours):  --------------------------------------------------------------------------------------  T(C): 37 (08-03-22 @ 23:00), Max: 37.2 (08-03-22 @ 19:00)  HR: 56 (08-04-22 @ 00:00) (42 - 61)  BP: 200/86 (08-04-22 @ 00:00) (122/57 - 212/86)  RR: 24 (08-04-22 @ 00:00) (12 - 28)  SpO2: 97% (08-04-22 @ 00:00) (96% - 100%)    08-02-22 @ 07:01  -  08-03-22 @ 07:00  --------------------------------------------------------  IN: 1110.5 mL / OUT: 0 mL / NET: 1110.5 mL    08-03-22 @ 07:01  -  08-04-22 @ 00:47  --------------------------------------------------------  IN: 150 mL / OUT: 1500 mL / NET: -1350 mL      --------------------------------------------------------------------------------------    EXAM  NEUROLOGY  Exam: Normal, NAD, alert, oriented x3, no focal deficits.    HEENT  Exam: Normocephalic, atraumatic, EOMI.     RESPIRATORY  Exam: Normal expansion/effort.  Mechanical Ventilation:     CARDIOVASCULAR  Exam: Regular rate and rhythm.       GI/NUTRITION  Exam: Abdomen soft, Non-tender, Non-distended.     VASCULAR  Exam: Extremities warm, pink, well-perfused.     MUSCULOSKELETAL  Exam: All extremities moving spontaneously without limitations.     SKIN  Exam: Good skin turgor, no skin breakdown.       LABS  --------------------------------------------------------------------------------------                        10.7   24.80 )-----------( 456      ( 03 Aug 2022 22:24 )             32.4   08-03    133<L>  |  92<L>  |  68<H>  ----------------------------<  183<H>  4.9   |  25  |  3.37<H>    Ca    8.3<L>      03 Aug 2022 22:24  Phos  4.0     08-03  Mg     2.1     08-03    TPro  5.8<L>  /  Alb  2.1<L>  /  TBili  0.2  /  DBili  0.2  /  AST  42<H>  /  ALT  39  /  AlkPhos  289<H>  08-03  PT/INR - ( 03 Aug 2022 22:24 )   PT: 14.7 sec;   INR: 1.26 ratio         PTT - ( 03 Aug 2022 22:24 )  PTT:32.1 sec  --------------------------------------------------------------------------------------     SICU Daily Progress Note  =====================================================  Interval/Overnight Events:     - multiple episodes of seizures  - Neurology consulted  - Patient put on EEG  - Haldol 2mg  - Versed 4mg  - Blood cultures sent  - UA not possible to obtain 2/2 scrotal/penile swelling    HPI:    Allergies: No Known Allergies      MEDICATIONS:   --------------------------------------------------------------------------------------  Neurologic Medications  acetaminophen     Tablet .. 650 milliGRAM(s) Oral every 6 hours PRN Mild Pain (1 - 3)  haloperidol    Injectable 2 milliGRAM(s) IntraMuscular once  melatonin 6 milliGRAM(s) Oral at bedtime  phenytoin   Capsule 100 milliGRAM(s) Oral every 8 hours    Respiratory Medications    Cardiovascular Medications  doxazosin 4 milliGRAM(s) Oral <User Schedule>  hydrALAZINE 25 milliGRAM(s) Oral every 8 hours  NIFEdipine XL 60 milliGRAM(s) Oral every 12 hours    Gastrointestinal Medications  pantoprazole    Tablet 40 milliGRAM(s) Oral daily  polyethylene glycol 3350 17 Gram(s) Oral daily  senna 2 Tablet(s) Oral at bedtime  sodium bicarbonate 1300 milliGRAM(s) Oral <User Schedule>    Genitourinary Medications    Hematologic/Oncologic Medications  epoetin cortney-epbx (RETACRIT) Injectable 31068 Unit(s) SubCutaneous every 7 days  heparin   Injectable 5000 Unit(s) SubCutaneous every 8 hours  mycophenolate mofetil 1000 milliGRAM(s) Oral every 12 hours  sirolimus 1 milliGRAM(s) Oral <User Schedule>    Antimicrobial/Immunologic Medications  ertapenem  IVPB 500 milliGRAM(s) IV Intermittent every 24 hours  fluconAZOLE IVPB 200 milliGRAM(s) IV Intermittent every 24 hours    Endocrine/Metabolic Medications  finasteride 5 milliGRAM(s) Oral daily  insulin glargine Injectable (LANTUS) 12 Unit(s) SubCutaneous at bedtime  insulin lispro (ADMELOG) corrective regimen sliding scale   SubCutaneous <User Schedule>  insulin lispro (ADMELOG) corrective regimen sliding scale   SubCutaneous three times a day before meals  insulin lispro (ADMELOG) corrective regimen sliding scale   SubCutaneous at bedtime  insulin lispro Injectable (ADMELOG) 6 Unit(s) SubCutaneous three times a day before meals  levothyroxine 50 MICROGram(s) Oral daily  predniSONE   Tablet 40 milliGRAM(s) Oral once    Topical/Other Medications  chlorhexidine 2% Cloths 1 Application(s) Topical <User Schedule>    --------------------------------------------------------------------------------------    VITAL SIGNS, INS/OUTS (last 24 hours):  --------------------------------------------------------------------------------------  T(C): 37 (08-03-22 @ 23:00), Max: 37.2 (08-03-22 @ 19:00)  HR: 56 (08-04-22 @ 00:00) (42 - 61)  BP: 200/86 (08-04-22 @ 00:00) (122/57 - 212/86)  RR: 24 (08-04-22 @ 00:00) (12 - 28)  SpO2: 97% (08-04-22 @ 00:00) (96% - 100%)    08-02-22 @ 07:01  -  08-03-22 @ 07:00  --------------------------------------------------------  IN: 1110.5 mL / OUT: 0 mL / NET: 1110.5 mL    08-03-22 @ 07:01  -  08-04-22 @ 00:47  --------------------------------------------------------  IN: 150 mL / OUT: 1500 mL / NET: -1350 mL      --------------------------------------------------------------------------------------    EXAM  NEUROLOGY  Exam: Normal, NAD, alert, oriented x3, no focal deficits.    HEENT  Exam: Normocephalic, atraumatic, EOMI.     RESPIRATORY  Exam: Normal expansion/effort.  Mechanical Ventilation:     CARDIOVASCULAR  Exam: Regular rate and rhythm.       GI/NUTRITION  Exam: Abdomen soft, Non-tender, Non-distended.     VASCULAR  Exam: Extremities warm, pink, well-perfused.     MUSCULOSKELETAL  Exam: All extremities moving spontaneously without limitations.     SKIN  Exam: Good skin turgor, no skin breakdown.       LABS  --------------------------------------------------------------------------------------                        10.7   24.80 )-----------( 456      ( 03 Aug 2022 22:24 )             32.4   08-03    133<L>  |  92<L>  |  68<H>  ----------------------------<  183<H>  4.9   |  25  |  3.37<H>    Ca    8.3<L>      03 Aug 2022 22:24  Phos  4.0     08-03  Mg     2.1     08-03    TPro  5.8<L>  /  Alb  2.1<L>  /  TBili  0.2  /  DBili  0.2  /  AST  42<H>  /  ALT  39  /  AlkPhos  289<H>  08-03  PT/INR - ( 03 Aug 2022 22:24 )   PT: 14.7 sec;   INR: 1.26 ratio         PTT - ( 03 Aug 2022 22:24 )  PTT:32.1 sec  --------------------------------------------------------------------------------------     SICU Daily Progress Note  =====================================================  Interval/Overnight Events:     - multiple episodes of seizures  - Neurology consulted  - Patient put on EEG  - Haldol 2mg  - Versed 4mg  - Blood cultures sent  - UA not possible to obtain 2/2 scrotal/penile swelling  - CT head obtained: no acute hemorrhage    HPI:    Allergies: No Known Allergies      MEDICATIONS:   --------------------------------------------------------------------------------------  Neurologic Medications  acetaminophen     Tablet .. 650 milliGRAM(s) Oral every 6 hours PRN Mild Pain (1 - 3)  haloperidol    Injectable 2 milliGRAM(s) IntraMuscular once  melatonin 6 milliGRAM(s) Oral at bedtime  phenytoin   Capsule 100 milliGRAM(s) Oral every 8 hours    Respiratory Medications    Cardiovascular Medications  doxazosin 4 milliGRAM(s) Oral <User Schedule>  hydrALAZINE 25 milliGRAM(s) Oral every 8 hours  NIFEdipine XL 60 milliGRAM(s) Oral every 12 hours    Gastrointestinal Medications  pantoprazole    Tablet 40 milliGRAM(s) Oral daily  polyethylene glycol 3350 17 Gram(s) Oral daily  senna 2 Tablet(s) Oral at bedtime  sodium bicarbonate 1300 milliGRAM(s) Oral <User Schedule>    Genitourinary Medications    Hematologic/Oncologic Medications  epoetin cortney-epbx (RETACRIT) Injectable 26344 Unit(s) SubCutaneous every 7 days  heparin   Injectable 5000 Unit(s) SubCutaneous every 8 hours  mycophenolate mofetil 1000 milliGRAM(s) Oral every 12 hours  sirolimus 1 milliGRAM(s) Oral <User Schedule>    Antimicrobial/Immunologic Medications  ertapenem  IVPB 500 milliGRAM(s) IV Intermittent every 24 hours  fluconAZOLE IVPB 200 milliGRAM(s) IV Intermittent every 24 hours    Endocrine/Metabolic Medications  finasteride 5 milliGRAM(s) Oral daily  insulin glargine Injectable (LANTUS) 12 Unit(s) SubCutaneous at bedtime  insulin lispro (ADMELOG) corrective regimen sliding scale   SubCutaneous <User Schedule>  insulin lispro (ADMELOG) corrective regimen sliding scale   SubCutaneous three times a day before meals  insulin lispro (ADMELOG) corrective regimen sliding scale   SubCutaneous at bedtime  insulin lispro Injectable (ADMELOG) 6 Unit(s) SubCutaneous three times a day before meals  levothyroxine 50 MICROGram(s) Oral daily  predniSONE   Tablet 40 milliGRAM(s) Oral once    Topical/Other Medications  chlorhexidine 2% Cloths 1 Application(s) Topical <User Schedule>    --------------------------------------------------------------------------------------    VITAL SIGNS, INS/OUTS (last 24 hours):  --------------------------------------------------------------------------------------  T(C): 37 (08-03-22 @ 23:00), Max: 37.2 (08-03-22 @ 19:00)  HR: 56 (08-04-22 @ 00:00) (42 - 61)  BP: 200/86 (08-04-22 @ 00:00) (122/57 - 212/86)  RR: 24 (08-04-22 @ 00:00) (12 - 28)  SpO2: 97% (08-04-22 @ 00:00) (96% - 100%)    08-02-22 @ 07:01  -  08-03-22 @ 07:00  --------------------------------------------------------  IN: 1110.5 mL / OUT: 0 mL / NET: 1110.5 mL    08-03-22 @ 07:01  -  08-04-22 @ 00:47  --------------------------------------------------------  IN: 150 mL / OUT: 1500 mL / NET: -1350 mL      --------------------------------------------------------------------------------------    EXAM  NEUROLOGY  Exam: Normal, NAD, alert, oriented x3, no focal deficits.    HEENT  Exam: Normocephalic, atraumatic, EOMI.     RESPIRATORY  Exam: Normal expansion/effort.  Mechanical Ventilation:     CARDIOVASCULAR  Exam: Regular rate and rhythm.       GI/NUTRITION  Exam: Abdomen soft, Non-tender, Non-distended.     VASCULAR  Exam: Extremities warm, pink, well-perfused.     MUSCULOSKELETAL  Exam: All extremities moving spontaneously without limitations.     SKIN  Exam: Good skin turgor, no skin breakdown.       LABS  --------------------------------------------------------------------------------------                        10.7   24.80 )-----------( 456      ( 03 Aug 2022 22:24 )             32.4   08-03    133<L>  |  92<L>  |  68<H>  ----------------------------<  183<H>  4.9   |  25  |  3.37<H>    Ca    8.3<L>      03 Aug 2022 22:24  Phos  4.0     08-03  Mg     2.1     08-03    TPro  5.8<L>  /  Alb  2.1<L>  /  TBili  0.2  /  DBili  0.2  /  AST  42<H>  /  ALT  39  /  AlkPhos  289<H>  08-03  PT/INR - ( 03 Aug 2022 22:24 )   PT: 14.7 sec;   INR: 1.26 ratio         PTT - ( 03 Aug 2022 22:24 )  PTT:32.1 sec  --------------------------------------------------------------------------------------     SICU Daily Progress Note  =====================================================  Interval/Overnight Events:     - multiple episodes of seizures  - Neurology consulted  - Patient put on EEG  - Haldol 2mg  - Versed 4mg  - Blood cultures sent  - UA not possible to obtain 2/2 scrotal/penile swelling  - Change of mental status, unable to move right hand  - CT head: no acute hemorrhage    HPI:    Allergies: No Known Allergies      MEDICATIONS:   --------------------------------------------------------------------------------------  Neurologic Medications  acetaminophen     Tablet .. 650 milliGRAM(s) Oral every 6 hours PRN Mild Pain (1 - 3)  haloperidol    Injectable 2 milliGRAM(s) IntraMuscular once  melatonin 6 milliGRAM(s) Oral at bedtime  phenytoin   Capsule 100 milliGRAM(s) Oral every 8 hours    Respiratory Medications    Cardiovascular Medications  doxazosin 4 milliGRAM(s) Oral <User Schedule>  hydrALAZINE 25 milliGRAM(s) Oral every 8 hours  NIFEdipine XL 60 milliGRAM(s) Oral every 12 hours    Gastrointestinal Medications  pantoprazole    Tablet 40 milliGRAM(s) Oral daily  polyethylene glycol 3350 17 Gram(s) Oral daily  senna 2 Tablet(s) Oral at bedtime  sodium bicarbonate 1300 milliGRAM(s) Oral <User Schedule>    Genitourinary Medications    Hematologic/Oncologic Medications  epoetin cortney-epbx (RETACRIT) Injectable 50213 Unit(s) SubCutaneous every 7 days  heparin   Injectable 5000 Unit(s) SubCutaneous every 8 hours  mycophenolate mofetil 1000 milliGRAM(s) Oral every 12 hours  sirolimus 1 milliGRAM(s) Oral <User Schedule>    Antimicrobial/Immunologic Medications  ertapenem  IVPB 500 milliGRAM(s) IV Intermittent every 24 hours  fluconAZOLE IVPB 200 milliGRAM(s) IV Intermittent every 24 hours    Endocrine/Metabolic Medications  finasteride 5 milliGRAM(s) Oral daily  insulin glargine Injectable (LANTUS) 12 Unit(s) SubCutaneous at bedtime  insulin lispro (ADMELOG) corrective regimen sliding scale   SubCutaneous <User Schedule>  insulin lispro (ADMELOG) corrective regimen sliding scale   SubCutaneous three times a day before meals  insulin lispro (ADMELOG) corrective regimen sliding scale   SubCutaneous at bedtime  insulin lispro Injectable (ADMELOG) 6 Unit(s) SubCutaneous three times a day before meals  levothyroxine 50 MICROGram(s) Oral daily  predniSONE   Tablet 40 milliGRAM(s) Oral once    Topical/Other Medications  chlorhexidine 2% Cloths 1 Application(s) Topical <User Schedule>    --------------------------------------------------------------------------------------    VITAL SIGNS, INS/OUTS (last 24 hours):  --------------------------------------------------------------------------------------  T(C): 37 (08-03-22 @ 23:00), Max: 37.2 (08-03-22 @ 19:00)  HR: 56 (08-04-22 @ 00:00) (42 - 61)  BP: 200/86 (08-04-22 @ 00:00) (122/57 - 212/86)  RR: 24 (08-04-22 @ 00:00) (12 - 28)  SpO2: 97% (08-04-22 @ 00:00) (96% - 100%)    08-02-22 @ 07:01  -  08-03-22 @ 07:00  --------------------------------------------------------  IN: 1110.5 mL / OUT: 0 mL / NET: 1110.5 mL    08-03-22 @ 07:01  -  08-04-22 @ 00:47  --------------------------------------------------------  IN: 150 mL / OUT: 1500 mL / NET: -1350 mL      --------------------------------------------------------------------------------------    EXAM  NEUROLOGY  Exam: Normal, NAD, alert, oriented x3, no focal deficits.    HEENT  Exam: Normocephalic, atraumatic, EOMI.     RESPIRATORY  Exam: Normal expansion/effort.  Mechanical Ventilation:     CARDIOVASCULAR  Exam: Regular rate and rhythm.       GI/NUTRITION  Exam: Abdomen soft, Non-tender, Non-distended.     VASCULAR  Exam: Extremities warm, pink, well-perfused.     MUSCULOSKELETAL  Exam: All extremities moving spontaneously without limitations.     SKIN  Exam: Good skin turgor, no skin breakdown.       LABS  --------------------------------------------------------------------------------------                        10.7   24.80 )-----------( 456      ( 03 Aug 2022 22:24 )             32.4   08-03    133<L>  |  92<L>  |  68<H>  ----------------------------<  183<H>  4.9   |  25  |  3.37<H>    Ca    8.3<L>      03 Aug 2022 22:24  Phos  4.0     08-03  Mg     2.1     08-03    TPro  5.8<L>  /  Alb  2.1<L>  /  TBili  0.2  /  DBili  0.2  /  AST  42<H>  /  ALT  39  /  AlkPhos  289<H>  08-03  PT/INR - ( 03 Aug 2022 22:24 )   PT: 14.7 sec;   INR: 1.26 ratio         PTT - ( 03 Aug 2022 22:24 )  PTT:32.1 sec  --------------------------------------------------------------------------------------

## 2022-08-04 NOTE — PROGRESS NOTE ADULT - SUBJECTIVE AND OBJECTIVE BOX
[facility] – [place] [facility]   MD NINA MASON MD, DO SADIE RAMIREZ NP    TEL:  OFFICE: [phone]    From 5pm-7am Answering Service [phone]    -- RENAL FOLLOW UP NOTE ---Date of Service 08-04-22 @ 14:36    Patient is a 66y old  Male who presents with a chief complaint of Status Epilepticus (04 Aug 2022 12:41)      Patient seen and examined in ICU.     VITALS:  T(F): 97.2 (08-04-22 @ 11:00), Max: 99 (08-03-22 @ 19:00)  HR: 56 (08-04-22 @ 14:00)  BP: 136/61 (08-04-22 @ 14:00)  RR: 18 (08-04-22 @ 14:00)  SpO2: 98% (08-04-22 @ 14:00)  Wt(kg): --    08-03 @ 07:01  -  08-04 @ 07:00  --------------------------------------------------------  IN: 400 mL / OUT: 1500 mL / NET: -1100 mL    08-04 @ 07:01  -  08-04 @ 14:36  --------------------------------------------------------  IN: 220 mL / OUT: 0 mL / NET: 220 mL          PHYSICAL EXAM:  Constitutional: NAD  Neck: No JVD  Respiratory: CTAB, no wheezes, rales or rhonchi  Cardiovascular: S1, S2, RRR  Gastrointestinal: BS+, soft, NT/ND  Extremities:  + peripheral edema    Hospital Medications:   MEDICATIONS  (STANDING):  chlorhexidine 2% Cloths 1 Application(s) Topical <User Schedule>  doxazosin 4 milliGRAM(s) Oral <User Schedule>  epoetin cortney-epbx (RETACRIT) Injectable 57599 Unit(s) SubCutaneous every 7 days  fosphenytoin IVPB 200 milliGRAM(s) PE IV Intermittent every 12 hours  heparin   Injectable 5000 Unit(s) SubCutaneous every 8 hours  hydrALAZINE 25 milliGRAM(s) Oral every 8 hours  insulin lispro (ADMELOG) corrective regimen sliding scale   SubCutaneous every 6 hours  melatonin 6 milliGRAM(s) Oral at bedtime  mycophenolate mofetil Suspension 1000 milliGRAM(s) Enteral Tube <User Schedule>  pantoprazole  Injectable 40 milliGRAM(s) IV Push daily  senna Syrup 10 milliLiter(s) Oral at bedtime  sodium bicarbonate 1300 milliGRAM(s) Oral <User Schedule>      LABS:  08-04    133<L>  |  94<L>  |  80<H>  ----------------------------<  56<L>  4.7   |  26  |  3.78<H>    Ca    8.3<L>      04 Aug 2022 09:39  Phos  4.7     08-04  Mg     2.2     08-04    TPro  5.8<L>  /  Alb  2.1<L>  /  TBili  0.2  /  DBili  0.2  /  AST  42<H>  /  ALT  39  /  AlkPhos  289<H>  08-03    Creatinine Trend: 3.78 <--, 3.37 <--, 4.40 <--, 4.26 <--, 4.09 <--, 3.69 <--, 3.59 <--, 2.52 <--, 2.65 <--, 3.41 <--    Phosphorus Level, Serum: 4.7 mg/dL (08-04 @ 09:39)  Albumin, Serum: 2.1 g/dL (08-03 @ 22:24)  Phosphorus Level, Serum: 4.0 mg/dL (08-03 @ 22:24)                              10.7   24.80 )-----------( 456      ( 03 Aug 2022 22:24 )             32.4     Urine Studies:  Urinalysis - [07-25-22 @ 18:28]      Color Yellow / Appearance Slightly Turbid / SG 1.020 / pH 5.5      Gluc Negative / Ketone Negative  / Bili Negative / Urobili Negative       Blood Small / Protein 100 / Leuk Est Large / Nitrite Negative      RBC 2 / WBC 40 / Hyaline 5 / Gran  / Sq Epi  / Non Sq Epi 0 / Bacteria Many      Iron 17, TIBC 171, %sat 10      [05-02-22 @ 13:03]  Ferritin 6336      [07-27-22 @ 13:00]  PTH -- (Ca 9.2)      [02-05-22 @ 10:13]   294  HbA1c 6.0      [02-21-20 @ 08:53]  TSH 4.69      [07-06-22 @ 18:36]  Lipid: chol --, , HDL --, LDL --      [07-27-22 @ 13:00]    HBsAg Nonreact      [07-25-22 @ 11:51]  HCV 0.18, Nonreact      [07-25-22 @ 11:51]      RADIOLOGY & ADDITIONAL STUDIES:

## 2022-08-04 NOTE — PROGRESS NOTE ADULT - ATTENDING COMMENTS
Interval History as per resident note, personally verified by me. Patient with 3 episodes of focal seizures yesterday, now improved. Happened after HD with blood pressure spike and also noted with rising leukocytosis. No further seizures now and he is connected to vEEG at bedside. No additional focal neurologic deficits noted.    Focused neurologic exam:  MS - Lethargic, AO x 1.5 (hospital but not which, no time), speech bradyphrenic but otherwise fluent, follows simple commands. Rep/naming, attn/conc, recent and remote memory, fund of knowledge dec  CN - PERRL, EOMI, VFF, face sens/str intact b/l except for R NLFF, hearing intact to conversation b/l, SCM at least 4+/5 b/l and symmetric, tongue/palate midline  Motor - Normal bulk. Inc tone in L side (paratonic?) and normal tone in R side. RUE 4/5, LUE 4+/5, RLE 3/5, LLE 3+/5 (limited by edema)  Sens - LT/temp intact all, as above  DTR's - 2+ BUE's, 3+ R KJ, 2+ L KJ, 1+ AJ b/l, and downgoing b/l plantar response  Coord - Grossly appropriate for level of strength  Gait and station - Due to fall risk/safety concerns did not assess    A/P:  Epilepsy, intractable, with resolved status epilepticus  Encephalopathy  S/p renal transplant, ANA  Prior strokes  Atrial fibrillation  CAD  HTN  DM type 2  Transaminitis (AST/alk phos inc 42/289)  Hyponatremia (Na 133)  Leukocytosis (WBC inc 24.8)  UTI    - Patient with new convulsive seizures consistent with his prior ones. Likely provoked in the setting of recent HD, high blood pressure, and systemic infection. Although CNS infection should be on the differential, especially given immunocompromised state with renal transplant, would favor more systemic process as opposed to primary CNS  - vEEG to evaluate for focal slowing, epileptiform discharges, and seizures  - Fosphenytoin 200mg IV BID, check new level and LFT's tomorrow (8/5) AM  - Due to prior behavioral changes Fycompa. If additional AED needed may then consider Briviact with HD dosing  - Discussed restarting Eliquis for secondary stroke prevention with family and kidney transplant service. As this interacts with phenytoin would not be as ideal as it would be at lower efficacy. Also not optimal to change AED's given patient has experienced a number of side effects and he has a lower threshold for further seizures. Best options would be either Lovenox or warfarin (adjust to goal INR 2-3) long term. Family and other medical teams are in agreement  - Recent MRI brain w/ and w/o without clear evidence of infection, inflammation, or acute CNS event (possibly minimal enhancement). 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 May consider repeat MRI brain if seizures recur given high blood pressure but less likely  - Continue to address above medical problems, as you are doing  - Will continue to follow patient with you, as needed

## 2022-08-04 NOTE — PROVIDER CONTACT NOTE (OTHER) - ASSESSMENT
pt A&OX2-3. SBP persistently above 170 despite multiple doses of hydralazine given overnight. all other vss. pt denies pain.

## 2022-08-04 NOTE — PROGRESS NOTE ADULT - ASSESSMENT
65 y/o male w/ a PMHx of CAD s/p CABG, AF on Eliquis c/b CVA c/b seizures, HTN, HLD, DM type II, hypothyroidism, GI bleed due to a duodenal ulcer, and ESRD s/p DDRT 4/21/22 c/b DGF requiring HD & large perinephric hematoma 5/22 s/p evacuation w/ revision of arterial anastomosis who presented in status epilepticus 6/10 from rehab requiring intubation for airway protection with a hospital course c/b large right pleural effusion s/p thoracentesis c/b hemothorax while being anticoagulated & requiring intubation for airway protection s/p chest tube placement w/ CT demonstrating retained hemothorax s/p right VATS, status epilepticus again causing AMS requiring intubation for airway protection again, delirium, dysphagia requiring NGT placement for enteral access, hyperkalemia, and poor glycemic control. Pt transferred to floor 7/10 and readmitted to SICU 7/25 for AMS, found to have ESBL klebsiella UTI, ANA, with renal rejection, and renal failure requiring HD.    Neuro: encephalopathy improved, hx of seizure  - Phenytoin 100 q8 for seizure ppx  - Appreciate neuro recs, no need to repeat levels at this time  - Multimodal pain control w/Tylenol  - Melatonin + protected sleep to improve delirium    Resp: no acute issues  - Out of bed to chair, ambulate as tolerated, and incentive spirometry to prevent atelectasis    CVS: hx of HTN, s/p CABG, AF on Eliquis  - Nifedipine 60 mg qd  - Coreg on hold as patient is bradycardic  -start hydralazine 25mg q8h with holding SBP >130    GI: transaminitis  - Regular diet, soft and bite sized   - Transaminitis continues to improve  - Bowel regimen with Miralax,  senna     Renal: DDRT 4/21 cb DGF, now w cf acute rejection, s/p IR renal bx 7/29,  - Crt elevated, K elevated shifted with calcium, bicrab and kayexelate  -HD today   -sirolimus  - anuric, no tucker  - Monitor I&Os and electrolytes  - replete electrolytes as needed   - Doxazosin + Finasteride  - Last HD Saturday 7/30, HD today (8/3) now on MW schedule    Heme:  - Monitor CBC and coags  - SCDs  - VTE ppx: heparin SC    ID: ESBL klebsiella UTI (7/15 + UCx, 7/25 +UCx)  - Blood cx negative 7/25, 7/27  - F/u Rpt blood cx   - Febrile w Tmax 38.6 on 7/30  - Monitor WBC, temperature, and procalcitonin  - Ertapenam until 8/3 + Fluconazole  -Immunosuppresion per transplant nephro     Endo: hx of DM, with Hyperglycemia,  Hypothyroidism,   - High dose Solumedrol in setting of possible acute graft rejection   - off insulin drip this am  - Latnus 12U at bedtime, Ademlog 6U before meals, Lantus 20u ISS  - Home Synthroid    Dispo: listed for floor     65 y/o male w/ a PMHx of CAD s/p CABG, AF on Eliquis c/b CVA c/b seizures, HTN, HLD, DM type II, hypothyroidism, GI bleed due to a duodenal ulcer, and ESRD s/p DDRT 4/21/22 c/b DGF requiring HD & large perinephric hematoma 5/22 s/p evacuation w/ revision of arterial anastomosis who presented in status epilepticus 6/10 from rehab requiring intubation for airway protection with a hospital course c/b large right pleural effusion s/p thoracentesis c/b hemothorax while being anticoagulated & requiring intubation for airway protection s/p chest tube placement w/ CT demonstrating retained hemothorax s/p right VATS, status epilepticus again causing AMS requiring intubation for airway protection again, delirium, dysphagia requiring NGT placement for enteral access, hyperkalemia, and poor glycemic control. Pt transferred to floor 7/10 and readmitted to SICU 7/25 for AMS, found to have ESBL klebsiella UTI, ANA, with renal rejection, and renal failure requiring HD.    Neuro: encephalopathy improved, hx of seizure  - Phenytoin 100 q8 for seizure ppx  - Appreciate neuro recs, no need to repeat levels at this time  - Multimodal pain control w/Tylenol  - Melatonin + protected sleep to improve delirium  - CT head (08/04): no acute hemorrhage    Resp: no acute issues  - Out of bed to chair, ambulate as tolerated, and incentive spirometry to prevent atelectasis    CVS: hx of HTN, s/p CABG, AF on Eliquis  - Nifedipine 60 mg qd  - Coreg on hold as patient is bradycardic  -start hydralazine 25mg q8h with holding SBP >130    GI: transaminitis  - Regular diet, soft and bite sized   - Transaminitis continues to improve  - Bowel regimen with Miralax,  senna     Renal: DDRT 4/21 cb DGF, now w cf acute rejection, s/p IR renal bx 7/29,  - Crt elevated, K elevated shifted with calcium, bicrab and kayexelate  -HD today   -sirolimus  - anuric, no tucker  - Monitor I&Os and electrolytes  - replete electrolytes as needed   - Doxazosin + Finasteride  - Last HD Saturday 7/30, HD today (8/3) now on MW schedule    Heme:  - Monitor CBC and coags  - SCDs  - VTE ppx: heparin SC    ID: ESBL klebsiella UTI (7/15 + UCx, 7/25 +UCx)  - Blood cx negative 7/25, 7/27  - F/u Rpt blood cx   - Febrile w Tmax 38.6 on 7/30  - Monitor WBC, temperature, and procalcitonin  - Ertapenam until 8/3 + Fluconazole  -Immunosuppresion per transplant nephro     Endo: hx of DM, with Hyperglycemia,  Hypothyroidism,   - High dose Solumedrol in setting of possible acute graft rejection   - off insulin drip this am  - Latnus 12U at bedtime, Ademlog 6U before meals, Lantus 20u ISS  - Home Synthroid    Dispo: listed for floor     65 y/o male w/ a PMHx of CAD s/p CABG, AF on Eliquis c/b CVA c/b seizures, HTN, HLD, DM type II, hypothyroidism, GI bleed due to a duodenal ulcer, and ESRD s/p DDRT 4/21/22 c/b DGF requiring HD & large perinephric hematoma 5/22 s/p evacuation w/ revision of arterial anastomosis who presented in status epilepticus 6/10 from rehab requiring intubation for airway protection with a hospital course c/b large right pleural effusion s/p thoracentesis c/b hemothorax while being anticoagulated & requiring intubation for airway protection s/p chest tube placement w/ CT demonstrating retained hemothorax s/p right VATS, status epilepticus again causing AMS requiring intubation for airway protection again, delirium, dysphagia requiring NGT placement for enteral access, hyperkalemia, and poor glycemic control. Pt transferred to floor 7/10 and readmitted to SICU 7/25 for AMS, found to have ESBL klebsiella UTI, ANA, with renal rejection, and renal failure requiring HD.    Neuro: encephalopathy improved, hx of seizure  - Phenytoin 100 q8 for seizure ppx  - Appreciate neuro recs, no need to repeat levels at this time  - Multimodal pain control w/Tylenol  - Melatonin + protected sleep to improve delirium  - Change of mentral status & unable to move R arm  - CT head (08/04): no acute hemorrhage    Resp: no acute issues  - Out of bed to chair, ambulate as tolerated, and incentive spirometry to prevent atelectasis    CVS: hx of HTN, s/p CABG, AF on Eliquis  - Nifedipine 60 mg qd  - Coreg on hold as patient is bradycardic  -start hydralazine 25mg q8h with holding SBP >130    GI: transaminitis  - Regular diet, soft and bite sized   - Transaminitis continues to improve  - Bowel regimen with Miralax,  senna     Renal: DDRT 4/21 cb DGF, now w cf acute rejection, s/p IR renal bx 7/29,  - Crt elevated, K elevated shifted with calcium, bicrab and kayexelate  -HD today   -sirolimus  - anuric, no tucker  - Monitor I&Os and electrolytes  - replete electrolytes as needed   - Doxazosin + Finasteride  - Last HD Saturday 7/30, HD today (8/3) now on MWF schedule    Heme:  - Monitor CBC and coags  - SCDs  - VTE ppx: heparin SC    ID: ESBL klebsiella UTI (7/15 + UCx, 7/25 +UCx)  - Blood cx negative 7/25, 7/27  - F/u Rpt blood cx   - Febrile w Tmax 38.6 on 7/30  - Monitor WBC, temperature, and procalcitonin  - Ertapenam until 8/3 + Fluconazole  -Immunosuppresion per transplant nephro     Endo: hx of DM, with Hyperglycemia,  Hypothyroidism,   - High dose Solumedrol in setting of possible acute graft rejection   - off insulin drip this am  - Latnus 12U at bedtime, Ademlog 6U before meals, Lantus 20u ISS  - Home Synthroid    Dispo: listed for floor     65 y/o male w/ a PMHx of CAD s/p CABG, AF on Eliquis c/b CVA c/b seizures, HTN, HLD, DM type II, hypothyroidism, GI bleed due to a duodenal ulcer, and ESRD s/p DDRT 4/21/22 c/b DGF requiring HD & large perinephric hematoma 5/22 s/p evacuation w/ revision of arterial anastomosis who presented in status epilepticus 6/10 from rehab requiring intubation for airway protection with a hospital course c/b large right pleural effusion s/p thoracentesis c/b hemothorax while being anticoagulated & requiring intubation for airway protection s/p chest tube placement w/ CT demonstrating retained hemothorax s/p right VATS, status epilepticus again causing AMS requiring intubation for airway protection again, delirium, dysphagia requiring NGT placement for enteral access, hyperkalemia, and poor glycemic control. Pt transferred to floor 7/10 and readmitted to SICU 7/25 for AMS, found to have ESBL klebsiella UTI, ANA, with renal rejection, and renal failure requiring HD.    Neuro: encephalopathy improved, hx of seizure  - Phenytoin 100 q8 for seizure ppx  - Appreciate neuro recs, no need to repeat levels at this time  - Multimodal pain control w/Tylenol  - Melatonin + protected sleep to improve delirium  - Change of mentral status & unable to move R arm  - CT head (08/04): no acute hemorrhage    Resp: no acute issues  - Out of bed to chair, ambulate as tolerated, and incentive spirometry to prevent atelectasis    CVS: hx of HTN, s/p CABG, AF on Eliquis  - Nifedipine 60 mg qd  - Coreg on hold as patient is bradycardic  -start hydralazine 25mg q8h with holding SBP >130    GI: transaminitis  - Regular diet, soft and bite sized   - TFs @ 20cc/hr  - Transaminitis continues to improve  - Bowel regimen with Miralax,  senna     Renal: DDRT 4/21 cb DGF, now w cf acute rejection, s/p IR renal bx 7/29,  - Crt elevated, K elevated shifted with calcium, bicrab and kayexelate  -HD today   -sirolimus  - anuric, no tucker  - Monitor I&Os and electrolytes  - replete electrolytes as needed   - Doxazosin + Finasteride  - Last HD Saturday 7/30, HD today (8/3) now on MWF schedule    Heme:  - Monitor CBC and coags  - SCDs  - VTE ppx: heparin SC    ID: ESBL klebsiella UTI (7/15 + UCx, 7/25 +UCx)  - Blood cx negative 7/25, 7/27  - F/u Rpt blood cx with new onset seizures  - Febrile w Tmax 38.6 on 7/30  - Monitor WBC, temperature, and procalcitonin  - Ertapenam/Fluconazole d/darren since they lower seizure threshold  - Immunosuppresion per transplant nephro     Endo: hx of DM, with Hyperglycemia,  Hypothyroidism,   - Hypoglycemia to 50's/60's, will alter patients insulin regimen  - High dose Solumedrol in setting of possible acute graft rejection, immunosuppression per transplant nephro  - Latnus 12U at bedtime, Ademlog 6U before meals, Lantus 20u ISS  - Home Synthroid    Dispo: listed for floor

## 2022-08-04 NOTE — PROGRESS NOTE ADULT - ASSESSMENT
67yo man with PMH of CVA (2019) with subsequent seizure disorder, ESRD s/p renal transplant (04/2022), afib (on apixaban), CAD (s/p stents), CABG (2020), HTN, HLD, DM presents to the ED with status epilepticus from [facility]. Semiology includes eyes deviated to the R, rhythmic R facial twitching as well as R shoulder clonic movements lasting approximately 30 seconds each. EEG from 04/2022 showed L frontocentral focal onset seizures. S/p intubation on 6/11. EEG negative for seizure but showed structural or functional abnormality in the left fronto-temporal region and moderate to severe nonspecific diffuse or multifocal cerebral dysfunction. RRT called 6/21 for decreased movement of RUE but with increased tone and tremor like activity/ clonus and diffuse increased tone and increased encephalopathy concerning for seizure. Course then complicated by EEG 6/24 showing L frontotemporal/ frontal seizures. Was on keppra, valproic acid, vimpat but had seizures through the prior two medications. Stopped vimpat, valproic acid, standing ativan, and fycompa. Stopped fycompa as patient became agitated, aggressive. Was only on phenytoin until recalled on evening of 8/3 into AM of 8/4     EEG 8/4:   2 Focal motor seizures originating from the left posterior temporal region with frontal evolution  Mild focal left centroparietal slowing   Mild generalized background slowing    Impression: History of recent status epilepticus that was refractory, was stable on phenytoin. Post HD and hypertensive with concerns of systemic inflammatory process (elevated leukcoytosis) patient found with recurrent seizures. Mental status improved possibly from hospital-induced delirium with toxic-metabolic encephalopathy.      Recommendations:  [] Continue recently increased  fosphenytoin 200mg q12h  [] phenytoin level 8/4 AM 4.8   [] monitor clinically for seizures and if patient has a seizure will consider addition of another AED (ie briviact)   [] holding off on repeat MRI brain w/ w/o con for now  [x] CSF without evidence of infection, inflammation  [x] Vitamin B12 1157, folate >20, homocysteine 13.4, Vitamin B1 160  [x] TSH 4.6, T3 3.9/T4 43 - correction of hypothyroidism per primary team and endocrinology  [x] BP goals of normotension   [x] MR brain without clear evidence of infection, inflammation, or acute CNS event (possibly minimal enhancement). Although prior study read as possible PRES, most recent MRI seems more consistent with significant chronic ischemic microvascular disease given no associated DWI changes or obvious enhancement  [x] Telemetry monitoring  [x] Neuro checks and vital signs per unit protocol  [x] Seizure/fall/aspiration precautions  [ ] please have adequate sleeping environment for the patient, light on, curtains open, TV on during the day with regular stimulation and out of bed to chair if possible. During the night, close curtains, TV off, lights off, and minimize interruptions (limit blood draws and vital sign checks if possible). Regular interaction with patient with staff (introducing selves), frequent reorientation, and encouragement of visitors as allowed by hospital and unit policy. Regular toileting.  [/] Advise against driving, operating heavy machinery, water sports/bathing, activity in elevation without appropriate safety precautions  [ ] outpatient EMG/ NCS    Patient seen and discussed with neurology attending, Dr. Maria.

## 2022-08-04 NOTE — EEG REPORT - NS EEG TEXT BOX
MANJINDERCHAD [id number]     Study Date: 08-04-22 00:24 to 8/4/22 8AM   Study hours: 7Hrs    --------------------------------------------------------------------------------------------------  History:  CC/ HPI Patient is a 66y old  Male who presents with a chief complaint of Status Epilepticus (04 Aug 2022 12:41)    MEDICATIONS  (STANDING):  chlorhexidine 2% Cloths 1 Application(s) Topical <User Schedule>  doxazosin 4 milliGRAM(s) Oral <User Schedule>  epoetin cortney-epbx (RETACRIT) Injectable 26138 Unit(s) SubCutaneous every 7 days  fosphenytoin IVPB 200 milliGRAM(s) PE IV Intermittent every 12 hours  heparin   Injectable 5000 Unit(s) SubCutaneous every 8 hours  hydrALAZINE 25 milliGRAM(s) Oral every 8 hours  insulin lispro (ADMELOG) corrective regimen sliding scale   SubCutaneous every 6 hours  melatonin 6 milliGRAM(s) Oral at bedtime  mycophenolate mofetil Suspension 1000 milliGRAM(s) Enteral Tube <User Schedule>  pantoprazole  Injectable 40 milliGRAM(s) IV Push daily  senna Syrup 10 milliLiter(s) Oral at bedtime  sodium bicarbonate 1300 milliGRAM(s) Oral <User Schedule>    --------------------------------------------------------------------------------------------------  Study Interpretation:    [Abbreviation Key:  PDR=alpha rhythm/posterior dominant rhythm. A-P=anterior posterior.  Amplitude: ‘very low’:<20; ‘low’:20-49; ‘medium’:; ‘high’:>150uV.  Persistence for periodic/rhythmic patterns (% of epoch) ‘rare’:<1%; ‘occasional’:1-10%; ‘frequent’:10-50%; ‘abundant’:50-90%; ‘continuous’:>90%.  Persistence for sporadic discharges: ‘rare’:<1/hr; ‘occasional’:1/min-1/hr; ‘frequent’:>1/min; ‘abundant’:>1/10 sec.  RPP=rhythmic and periodic patterns; GRDA=generalized rhythmic delta activity; FIRDA=frontal intermittent GRDA; LRDA=lateralized rhythmic delta activity; TIRDA=temporal intermittent rhythmic delta activity;  LPD=PLED=lateralized periodic discharges; GPD=generalized periodic discharges; BIPDs =bilateral independent periodic discharges; Mf=multifocal; SIRPDs=stimulus induced rhythmic, periodic, or ictal appearing discharges; BIRDs=brief potentially ictal rhythmic discharges >4 Hz, lasting .5-10s; PFA (paroxysmal bursts >13 Hz or =8 Hz <10s).  Modifiers: +F=with fast component; +S=with spike component; +R=with rhythmic component.  S-B=burst suppression pattern.  Max=maximal. N1-drowsy; N2-stage II sleep; N3-slow wave sleep. SSS/BETS=small sharp spikes/benign epileptiform transients of sleep. HV=hyperventilation; PS=photic stimulation]    Daily EEG Visual Analysis  FINDINGS:      Background:  Continuous: continuous  Symmetry: symmetric  PDR: 7-8 Hz activity, with amplitude to 40 uV, that attenuated to eye opening.    Reactivity: present  Voltage: normal (between 20-150uV)  Anterior Posterior Gradient: present  Other background findings: none  Breach: absent    Background Slowing:  Generalized slowing: mild theta slowing   Focal slowing: left hemispheric theta and delta slowing     State Changes:   -Drowsiness noted with increased slowing, attenuation of fast activity, vertex transients.  -Present with N2 sleep transients with symmetric spindles and K-complexes.    Sporadic Epileptiform Discharges:    None    Rhythmic and Periodic Patterns (RPPs):  None     Electrographic and Electroclinical seizures:  2 electroclinical focal seizures originating from the left hemisphere, at 00:40 EEG obscured by muscle artifact, towards the end of the seizure activity there is left centroparietal polyspike activity. At 5:07 AM there is fast activity maximum at F3C3P3, and evolving rhythmic theta activity maximum at T7/P7.  Clinically the patient presented right facial twitching, right neck deviation and right hand shaking.     Other Clinical Events:  None    Activation Procedures:   -Hyperventilation was not performed.    -Photic stimulation was not performed.     Artifacts:  Intermittent myogenic and movement artifacts were noted.    ECG:  The heart rate on single channel ECG was predominantly between 50-60 BPM.    EEG Classification / Summary:  Abnormal EEG study  2 focal motor seizures originating from the left centroparietal region  Mild focal left centroparietal slowing   Mild generalized background slowing    -----------------------------------------------------------------------------------------------------  Clinical Impression:  2 focal motor seizures originating from the centroparietal region  Mild left hemispheric dysfunction   Mild diffuse cerebral dysfunction, not specific as to etiology.    This is a preliminary report    Robert Stokes MD  Epilepsy Fellow , [facility]  Department of Neurology, [facility] [employer]    [facility] EEG Reading Room Ph#: [phone]  Epilepsy Answering Service after 5PM and before 8:30AM: Ph#: [phone]   MANJINDERCHAD [id number]     Study Date: 08-04-22 00:24 to 8/4/22 8AM   Study hours: 7Hrs    --------------------------------------------------------------------------------------------------  History:  CC/ HPI Patient is a 66y old  Male who presents with a chief complaint of Status Epilepticus (04 Aug 2022 12:41)    MEDICATIONS  (STANDING):  chlorhexidine 2% Cloths 1 Application(s) Topical <User Schedule>  doxazosin 4 milliGRAM(s) Oral <User Schedule>  epoetin cortney-epbx (RETACRIT) Injectable 66986 Unit(s) SubCutaneous every 7 days  fosphenytoin IVPB 200 milliGRAM(s) PE IV Intermittent every 12 hours  heparin   Injectable 5000 Unit(s) SubCutaneous every 8 hours  hydrALAZINE 25 milliGRAM(s) Oral every 8 hours  insulin lispro (ADMELOG) corrective regimen sliding scale   SubCutaneous every 6 hours  melatonin 6 milliGRAM(s) Oral at bedtime  mycophenolate mofetil Suspension 1000 milliGRAM(s) Enteral Tube <User Schedule>  pantoprazole  Injectable 40 milliGRAM(s) IV Push daily  senna Syrup 10 milliLiter(s) Oral at bedtime  sodium bicarbonate 1300 milliGRAM(s) Oral <User Schedule>    --------------------------------------------------------------------------------------------------  Study Interpretation:    [Abbreviation Key:  PDR=alpha rhythm/posterior dominant rhythm. A-P=anterior posterior.  Amplitude: ‘very low’:<20; ‘low’:20-49; ‘medium’:; ‘high’:>150uV.  Persistence for periodic/rhythmic patterns (% of epoch) ‘rare’:<1%; ‘occasional’:1-10%; ‘frequent’:10-50%; ‘abundant’:50-90%; ‘continuous’:>90%.  Persistence for sporadic discharges: ‘rare’:<1/hr; ‘occasional’:1/min-1/hr; ‘frequent’:>1/min; ‘abundant’:>1/10 sec.  RPP=rhythmic and periodic patterns; GRDA=generalized rhythmic delta activity; FIRDA=frontal intermittent GRDA; LRDA=lateralized rhythmic delta activity; TIRDA=temporal intermittent rhythmic delta activity;  LPD=PLED=lateralized periodic discharges; GPD=generalized periodic discharges; BIPDs =bilateral independent periodic discharges; Mf=multifocal; SIRPDs=stimulus induced rhythmic, periodic, or ictal appearing discharges; BIRDs=brief potentially ictal rhythmic discharges >4 Hz, lasting .5-10s; PFA (paroxysmal bursts >13 Hz or =8 Hz <10s).  Modifiers: +F=with fast component; +S=with spike component; +R=with rhythmic component.  S-B=burst suppression pattern.  Max=maximal. N1-drowsy; N2-stage II sleep; N3-slow wave sleep. SSS/BETS=small sharp spikes/benign epileptiform transients of sleep. HV=hyperventilation; PS=photic stimulation]    Daily EEG Visual Analysis  FINDINGS:      Background:  Continuous: continuous  Symmetry: symmetric  PDR: 7-8 Hz activity, with amplitude to 40 uV, that attenuated to eye opening.    Reactivity: present  Voltage: normal (between 20-150uV)  Anterior Posterior Gradient: present  Other background findings: none  Breach: absent    Background Slowing:  Generalized slowing: mild theta slowing   Focal slowing: left hemispheric theta and delta slowing , left temporal TIRDA near 1hz    State Changes:   -Drowsiness noted with increased slowing, attenuation of fast activity, vertex transients.  -Present with N2 sleep transients with symmetric spindles and K-complexes.    Sporadic Epileptiform Discharges:    None    Rhythmic and Periodic Patterns (RPPs):  None     Electrographic and Electroclinical seizures:  2 electroclinical focal seizures originating from the left hemisphere, at 00:40 EEG obscured by muscle artifact, towards the end of the seizure activity there is left centroparietal polyspike activity.     At 5:07 AM there is fast activity maximum at F3 C3 P3, preceded initially by evolving rhythmic theta activity maximum at T7/P7.  Clinically the patient presented right facial twitching, right neck deviation and right hand shaking.     Other Clinical Events:  None    Activation Procedures:   -Hyperventilation was not performed.    -Photic stimulation was not performed.     Artifacts:  Intermittent myogenic and movement artifacts were noted.    ECG:  The heart rate on single channel ECG was predominantly between 50-60 BPM.    EEG Classification / Summary:  Abnormal EEG study  2 Focal motor seizures originating from the left posterior temporal region with frontal evolution  Mild focal left centroparietal slowing   Mild generalized background slowing    -----------------------------------------------------------------------------------------------------  Clinical Impression:  2 focal motor seizures originating from the posterior temporal region with frontal evolution  Left hemispheric dysfunction, temporal maximal   Mild diffuse cerebral dysfunction, not specific as to etiology.    Robert Stokes MD  Epilepsy Fellow , [facility]  Department of Neurology, [facility] [employer]    [facility] EEG Reading Room Ph#: [phone]  Epilepsy Answering Service after 5PM and before 8:30AM: Ph#: [phone]

## 2022-08-04 NOTE — PROGRESS NOTE ADULT - ASSESSMENT
66 year old male with PMH of  DM type II, HTN, CAD s/p CABG in 2020, Afib on Eliquis, CVA in 2019 due to Afib, Seizure d/o (last episode was on 4/8/22), h/o GI bleed in 2/2022 EGD with duodenal ulcer and ESRD on HD s/p R DDRT from DCD donor on 4/21/22 complicated by DGF requiring HD until 4/29/22, later had good graft function who was readmitted with status epilepticus in setting of UTI/antibiotics and sub-therapeutic valproic acid level.  Transferred to SICU on 7/25 with signs of sepsis. Biopsy from 7/29 demonstrating grade 1a rejection. Started pulse steroids (Solumedrol 500 on 7/30 -> 250 on 7/31).     1. 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 s/p pulse dose steroids. No  DSA.   IUF today as he is volume overloaded.   2. IS meds- was on Belatacept. switched to Sirolimus ,  mg po bid and steroid taper.   3. AMS , seizures - has 3 episodes of seizures last night. CT head was negative. on EEG and Neurology recs appreciated.  4. DM -  on Lantus and lispro.   5. ESBL klebsiella UTI (7/15 + UCx, 7/25 +UCx)-was on Ertapenam until 8/3 + Fluconazole. Repeat cultures sent last night since his WBC increased to 24K today

## 2022-08-04 NOTE — PROVIDER CONTACT NOTE (CHANGE IN STATUS NOTIFICATION) - BACKGROUND
Pt s/p DDRT complicated by seizure activity and PLL pleural effusion. Pt now s/p VATS with 2 R sided chest tubes.
Patient here for status ellipticus
pt admitted from rehab for status epilepticus on 6/10

## 2022-08-04 NOTE — PROGRESS NOTE ADULT - PROBLEM SELECTOR PLAN 3
- Continue with LT4 50 mcg daily via NGT or 37.5 mcg IV Levothyroxine  - monitor TFTs outpatient in 4 weeks after dc with Dr. Lincoln. - Continue with LT4 50 mcg daily via NGT or 37.5 mcg IV Levothyroxine  - monitor TFTs outpatient in 4 weeks after dc with Dr. Lincoln

## 2022-08-04 NOTE — PROGRESS NOTE ADULT - NUTRITIONAL ASSESSMENT
This patient has been assessed with a concern for Malnutrition and has been determined to have a diagnosis/diagnoses of Severe protein-calorie malnutrition.    This patient is being managed with:   Diet NPO with Tube Feed-  Tube Feeding Modality: Nasogastric  Nepro with Carb Steady (NEPRORTH)  Total Volume for 24 Hours (mL): 480  Continuous  Starting Tube Feed Rate {mL per Hour}: 20  Until Goal Tube Feed Rate (mL per Hour): 20  Tube Feed Duration (in Hours): 24  Tube Feed Start Time: 11:00  Entered: Aug  4 2022 10:54AM

## 2022-08-04 NOTE — CHART NOTE - NSCHARTNOTEFT_GEN_A_CORE
Patient noted to have seizure like episode around midnight per RN; was seen to have R face/arm/leg shaking lasting 30-45 seconds. Patient was seen post event, was noted to be AXO2-3; was following commands. Was HTNsive w/ SBP in in low 200s, was given hydralazine 10 w/ some improvement. Labs sig for WBC of 24. Neuro was called and EEG was started. He was given a dose of haldol for agitation. Patient later noted to have another seizure like episode around 1 AM; however w/ facial L sided facial twitching this time, last ~45 seconds. He was given Versed 4mg per neuro recs. Noted to be post ictal after 2nd event, was not following commands and w/ more notable R sided weakness. 2nd event was not caught on EEG since EEG was in the process of being installed. Noncontrast head CT was done and was neg for intracranial hemorrhage. SBP was in 190s, was given additional hydralazine IV 10X2 doses.     Around 5:30 AM, patient had another seizure like event, neuro was again called, recommended to increase phenytoin to 200mg; being given Fosphenytoin 200 X1 doses this AM (Phenytoin level 4.8 this AM). Patient is currently hemodynamically stable, answering yes/no questions, but is confused and unable to speak in complete sentences therefore unable to assess mental status accurately.     For leukocytosis, Ertapenem was not switched to meropenem since meropenem decreases threshold for seizures more so than ertapenem as per neuro.     Plan:  -discontinue ertapenem since leukocytosis worsening even though has been on ertapenem for 9 days. Repeat Bld cx sent. Unable to send urine cx since minimal urine in bladder and unable to straight cath given significant penile edema. Will f/u ID recs this AM.  -continue EEG and f/u report and neuro recs this AM  -Overnight events were discussed w/ Chayito Castelan over phone  -discussed plan w/ Dr. David Patient noted to have seizure like episode around midnight per RN; was seen to have R face/arm/leg shaking lasting 30-45 seconds. Patient was seen post event, was noted to be AXO2-3; was following commands. Was HTNsive w/ SBP in in low 200s, was given hydralazine 10 w/ some improvement. Labs sig for WBC of 24. Neuro was called and EEG was started. He was given a dose of haldol for agitation. Patient later noted to have another seizure like episode around 1 AM; however w/ facial L sided facial twitching this time, last ~45 seconds. He was given Versed 4mg per neuro recs. Noted to be post ictal after 2nd event, was not following commands and w/ more notable R sided weakness. 2nd event was not caught on EEG since EEG was in the process of being installed. Noncontrast head CT was done and was neg for intracranial hemorrhage. SBP was in 190s, was given additional hydralazine IV 10X2 doses.     Around 5:30 AM, patient had another focal seizure (lasting ~3 mins) w/ R arm/leg shaking and facial twitching.  Neuro was again called, recommended to increase phenytoin to 200mg TID form 100 TID; being given Fosphenytoin 200 X1 doses this AM since unable to take PO meds at this time. (Phenytoin level 4.8 this AM). Neuro also recommended adding Fycompa. Patient is currently hemodynamically stable, answering yes/no questions, but is confused and unable to speak in complete sentences therefore unable to assess mental status accurately.     For leukocytosis, Ertapenem was not switched to meropenem since meropenem decreases threshold for seizures more so than ertapenem as per neuro.     Plan:  -discontinue ertapenem since leukocytosis worsening even though has been on ertapenem for 9 days. Repeat Bld cx sent. Unable to send urine cx since minimal urine in bladder and unable to straight cath given significant penile edema. Will f/u ID recs this AM.  -continue EEG and f/u report and neuro recs this AM  -Overnight events were discussed w/ Chayito Castelan over phone  -discussed plan w/ Dr. David Patient noted to have seizure like episode around midnight per RN; was seen to have R face/arm/leg shaking lasting 30-45 seconds. Patient was seen post event, was noted to be AXO2-3; was following commands. Was HTNsive w/ SBP in in low 200s, was given hydralazine 10 w/ some improvement. Labs sig for WBC of 24. Neuro was called and EEG was started. He was given a dose of haldol for agitation. Patient later noted to have another seizure like episode around 1 AM; however w/ facial L sided facial twitching this time, last ~45 seconds. He was given Versed 4mg per neuro recs. Noted to be post ictal after 2nd event, was not following commands and w/ more notable R sided weakness. 2nd event was not caught on EEG since EEG was in the process of being installed. Noncontrast head CT was done and was neg for intracranial hemorrhage. SBP was in 190s, was given additional hydralazine IV 10X2 doses.     Around 5:30 AM, patient had another focal seizure (lasting ~3 mins) w/ R arm/leg shaking and facial twitching.  Neuro was again called, recommended to increase phenytoin to 200mg TID form 100 TID; being given Fosphenytoin 200 X1 doses this AM since unable to take PO meds at this time. (Phenytoin level 4.8 this AM). Neuro also recommended adding Fycompa. Patient currently w/ stable vitals, answering yes/no questions, but is confused and unable to speak in complete sentences therefore unable to assess mental status accurately.     For leukocytosis, Ertapenem was not switched to meropenem since meropenem decreases threshold for seizures more so than ertapenem as per neuro.     Plan:  -discontinue ertapenem since leukocytosis worsening even though has been on ertapenem for 9 days. Repeat Bld cx sent. Unable to send urine cx since minimal urine in bladder and unable to straight cath given significant penile edema. Will f/u ID recs this AM.  -continue EEG and f/u report and neuro recs this AM  -Overnight events were discussed w/ Chayito Castelan over phone  -discussed plan w/ Dr. David

## 2022-08-04 NOTE — PROVIDER CONTACT NOTE (OTHER) - ACTION/TREATMENT ORDERED:
MD Burk to bedside. MD Burk states "this is normal for this patient", RN  continues to tell MD Burk that this is a change from RNs baseline assessment. No action just continue to monitor as per MD Burk.

## 2022-08-04 NOTE — PROGRESS NOTE ADULT - ASSESSMENT
66 yr old M with Type 2 DM> unknown control due to skewed A1C 6.6% secondary to chronic anemia and s/p blood tx. On basal/bolus insulin therapy PTA. DM c/b ESRD s/p DDRT on 3/21, CVA, CAD s/p CABG, Afib on apixaban, seizure activity, HTN, HLD, h/o GI bleed in 2/2022 EGD with duodenal ulcer, discharged to [facility] rehab center after prior hospitalization, now re-admitted from [facility] for seizures c/f status epilepticus in setting of UTI. Endocrine consulted for steroid induced hyperglycemia. Has been on pulse steroid, now on prednisone taper. Previously noted to be hyperglycemic, now hypoglycemic off insulin.

## 2022-08-04 NOTE — PROVIDER CONTACT NOTE (CHANGE IN STATUS NOTIFICATION) - ASSESSMENT
pt A&OX4 prior to seizure but forgetful. RN in room when seizure activity started. pt repeatedly twitching and convulsing on the right side of his body. right side of face, arm and leg. seizure lasted for approx 45 seconds.  pt had high /86, all other vss and remains afebrile.

## 2022-08-04 NOTE — PROVIDER CONTACT NOTE (CHANGE IN STATUS NOTIFICATION) - ACTION/TREATMENT ORDERED:
PA notified. no ativan available in hospital. 10mg of hydralazine given for high /86. blood cultures x2 drawn and EEG ordered. no other interventions at this time.

## 2022-08-04 NOTE — PROVIDER CONTACT NOTE (OTHER) - ACTION/TREATMENT ORDERED:
WALDO Yepez aware- 2 juices to be administered via KO feed. Reassess q15 until glucose >100. AWLDO Yepez aware- 2 juices to be administered via KO feed. Reassess in 15minutes and continue assessing glucose every 2 hours as per WALDO Yepez.

## 2022-08-04 NOTE — PROGRESS NOTE ADULT - ATTENDING COMMENTS
hip pain/injury Pt is a 66 year old male with a medical history signficant for CAD (s/p CABG), AF (on Eliquis c/b CVA c/b seizures), HTN, HLD, DM type II, hypothyroidism, GI bleed due to a duodenal ulcer, and ESRD (s/p DDRT 4/21/22 c/b DGF requiring HD) with large perinephric hematoma (s/p evacuation w/ revision of arterial anastomosis) who presented in status epilepticus. Pt was intubated for airway protection. Pt transferred to floor 7/10 and readmitted to SICU 7/25 for AMS due to an ESBL klebsiella UTI, ANA/renal rejection, and renal failure requiring HD. Pt continues to have bradycardia. Pt with multiple seizures overnight.    Seizure disorder  Dilantin started  EEG in progress  CT stable  Ertapenem discontinued  Pt seen by neurology overnight  Continue dilantin and multimodal pain control  Melatonin + protected sleep to improve delirium    Atelectasis  Continue ISP    HTN/CAD, H/o CABG  AFib on eliquis  Continue nifedipine  Continue hydralazine  Hold coreg for bradycardia    Improving transaminitis  Continue to monitor  Start tube feeds at 20ml/hr    S/p DDRT 4/21 cb DGF, now cb acute rejection, s/p IR renal bx 7/29  Continue pulse steroids  Monitor I&Os and electrolytes  HD performed yesterday  For repeat HD today    Start Lovenox  Monitor 10A levels    SBL klebsiella UTI (7/15 + UCx, 7/25 +UCx)  Cont Ertapenam until 8/3 + Fluconazole  Immunosuppresion per transplant nephro     DM2  Hypoglycemia, on lantus  Orange juice/sugar given  Lantus adjusted per endocrinology.

## 2022-08-04 NOTE — PROGRESS NOTE ADULT - SUBJECTIVE AND OBJECTIVE BOX
Neurology Progress Note    SUBJECTIVE/OBJECTIVE/INTERVAL EVENTS: Patient seen and examined at bedside w/ neuro attending and team. Patient had several witnessed seizures overnight. Please see prior chart notes outlining events. Was recommended to increase to fosphenytoin to 200mg BID IV and considering adding fycompa however was recommended to avoid the latter per transplant team. Patient evaluated at bedside with family.        VITALS & EXAMINATION:  Vital Signs Last 24 Hrs  T(C): 36.4 (04 Aug 2022 15:00), Max: 37.2 (03 Aug 2022 19:00)  T(F): 97.5 (04 Aug 2022 15:00), Max: 99 (03 Aug 2022 19:00)  HR: 54 (04 Aug 2022 15:00) (49 - 61)  BP: 137/63 (04 Aug 2022 15:00) (118/58 - 212/86)  BP(mean): 91 (04 Aug 2022 15:00) (80 - 123)  RR: 16 (04 Aug 2022 15:00) (12 - 27)  SpO2: 99% (04 Aug 2022 15:00) (96% - 100%)    Parameters below as of 04 Aug 2022 15:00  Patient On (Oxygen Delivery Method): nasal cannula  O2 Flow (L/min): 1    General appearance: does not appear to be in pain  Head: normocephalic  Eyes: clear sclera  Respiratory: breathing regularly    Focused neurologic exam:  MS - awake, alert, oriented to person, place "stated hospital", not date. Follows simple commands. Attend to stimuli from both sides.   CN - pupils equal round, EOMI, BTT b/l, mild R facial droop, cannot assess CNV, hearing intact to conversation b/l, shoulder shrug limited evaluation   Motor - Normal bulk. Inc tone in L side and normal tone in R side. Spontaneous movements of L > R side and at least antigravity. Proximal str LUE 4+, RUE 4, LLE 4-, RLE 3    Sens - LT/temp intact all, as above  Toes downgoing bilaterally   Gait and station - PERRY    LABORATORY:  CBC                       10.7   24.80 )-----------( 456      ( 03 Aug 2022 22:24 )             32.4     Chem 08-04    133<L>  |  94<L>  |  80<H>  ----------------------------<  56<L>  4.7   |  26  |  3.78<H>    Ca    8.3<L>      04 Aug 2022 09:39  Phos  4.7     08-04  Mg     2.2     08-04    TPro  5.8<L>  /  Alb  2.1<L>  /  TBili  0.2  /  DBili  0.2  /  AST  42<H>  /  ALT  39  /  AlkPhos  289<H>  08-03    LFTs LIVER FUNCTIONS - ( 03 Aug 2022 22:24 )  Alb: 2.1 g/dL / Pro: 5.8 g/dL / ALK PHOS: 289 U/L / ALT: 39 U/L / AST: 42 U/L / GGT: x           Coagulopathy PT/INR - ( 03 Aug 2022 22:24 )   PT: 14.7 sec;   INR: 1.26 ratio      PTT - ( 03 Aug 2022 22:24 )  PTT:32.1 sec  Lipid Panel 07-27 Chol -- LDL -- HDL -- Trig 118  A1c   Cardiac enzymes     U/A   CSF  Immunological  Other    STUDIES & IMAGING: (EEG, CT, MR, U/S, TTE/DINAH): Neurology Progress Note    SUBJECTIVE/OBJECTIVE/INTERVAL EVENTS: Patient seen and examined at bedside w/ neuro attending and team. Patient had several witnessed seizures overnight. Please see prior chart notes outlining events. Was recommended to increase to fosphenytoin to 200mg BID IV and considering adding fycompa however was recommended to avoid the latter per transplant team. Patient evaluated at bedside with family.      FHx: Non-contributory    ROS: All systems negative except as documented in Interval History    VITALS & EXAMINATION:  Vital Signs Last 24 Hrs  T(C): 36.4 (04 Aug 2022 15:00), Max: 37.2 (03 Aug 2022 19:00)  T(F): 97.5 (04 Aug 2022 15:00), Max: 99 (03 Aug 2022 19:00)  HR: 54 (04 Aug 2022 15:00) (49 - 61)  BP: 137/63 (04 Aug 2022 15:00) (118/58 - 212/86)  BP(mean): 91 (04 Aug 2022 15:00) (80 - 123)  RR: 16 (04 Aug 2022 15:00) (12 - 27)  SpO2: 99% (04 Aug 2022 15:00) (96% - 100%)    Parameters below as of 04 Aug 2022 15:00  Patient On (Oxygen Delivery Method): nasal cannula  O2 Flow (L/min): 1    General appearance: does not appear to be in pain  Head: normocephalic  Eyes: clear sclera  Respiratory: breathing regularly    Focused neurologic exam:  MS - awake, alert, oriented to person, place "stated hospital", not date. Follows simple commands. Attend to stimuli from both sides.   CN - pupils equal round, EOMI, BTT b/l, mild R facial droop, cannot assess CNV, hearing intact to conversation b/l, shoulder shrug limited evaluation   Motor - Normal bulk. Inc tone in L side and normal tone in R side. Spontaneous movements of L > R side and at least antigravity. Proximal str LUE 4+, RUE 4, LLE 4-, RLE 3    Sens - LT/temp intact all, as above  Toes downgoing bilaterally   Gait and station - PERRY    LABORATORY:  CBC                       10.7   24.80 )-----------( 456      ( 03 Aug 2022 22:24 )             32.4     Chem 08-04    133<L>  |  94<L>  |  80<H>  ----------------------------<  56<L>  4.7   |  26  |  3.78<H>    Ca    8.3<L>      04 Aug 2022 09:39  Phos  4.7     08-04  Mg     2.2     08-04    TPro  5.8<L>  /  Alb  2.1<L>  /  TBili  0.2  /  DBili  0.2  /  AST  42<H>  /  ALT  39  /  AlkPhos  289<H>  08-03    LFTs LIVER FUNCTIONS - ( 03 Aug 2022 22:24 )  Alb: 2.1 g/dL / Pro: 5.8 g/dL / ALK PHOS: 289 U/L / ALT: 39 U/L / AST: 42 U/L / GGT: x           Coagulopathy PT/INR - ( 03 Aug 2022 22:24 )   PT: 14.7 sec;   INR: 1.26 ratio      PTT - ( 03 Aug 2022 22:24 )  PTT:32.1 sec  Lipid Panel 07-27 Chol -- LDL -- HDL -- Trig 118  A1c   Cardiac enzymes     U/A   CSF  Immunological  Other    STUDIES & IMAGING: (EEG, CT, MR, U/S, TTE/DINAH):

## 2022-08-04 NOTE — PROGRESS NOTE ADULT - ASSESSMENT
66 yr old Male with PMHx ESRD s/p permacath removal 5/10/22 s/p Rt DDRT 4/21/22,  CVA (2019), Afib on apixaban, seizure activity, CAD s/p stents, CABG (2020), HTN, HLD, DM on insulin, gastric/duodenal ulcer, recent hospitalization 4/28/22 -5/10/22 with weakness/anemia found to have perinephric hematoma requiring evacuation and repair of bleeding arterial anastomosis. Curently being treated for UTI with Levaquin Today after developing multiple sz episodes refractory to 5 mg versed IM (EMS) and 2 mg ativan IV in E.D. concerning for status epilepticus requiring intubation for hypoxic respiratory  failure. MICU Consult was called for hypoxic respiratory failure secondary to status epilepticus.  Nephrology consulted for renal failure.     A/P  DDRT on 04/21/22  Course complicated by DGF, was on HD until 4/29/22. Readmitted in May for anemia in the setting of large perinephric hematoma s/p evacuation and repair of arterial anastomosis on 5/02/22. Intra operative biopsy showed no rejection.  ANA was initially sec to  hemodynamic change   stent removal 07/12/22  No hydronephrosis noted on renal US. UA done on 7/25/22 showed LE and pyuria.   allograft US 07/28/22 shows Interval increase in the resistive indices and peak systolic velocities   compared to prior study performed yesterday. Short-term imaging follow-up   if clinically indicated.  Grade 1a rejection (biopsy on 7/29) - s/p pulse dose steroids  f/u repeat DSA.   continue Immunosuppression per transplant team  last HD 08/03/22  ;  HD per transplant team   transplant team on board   monitor BMP, U/O     HTN   optimal   manage by transplant team    monitor BP closely

## 2022-08-04 NOTE — PROGRESS NOTE ADULT - SUBJECTIVE AND OBJECTIVE BOX
[facility] DIVISION OF KIDNEY DISEASES AND HYPERTENSION -- FOLLOW UP NOTE  --------------------------------------------------------------------------------  Chief Complaint:    24 hour events/subjective:  Patient was seen and examined at bedside  Had 3 episodes of seizures last night.     PAST HISTORY  --------------------------------------------------------------------------------  No significant changes to PMH, PSH, FHx, SHx, unless otherwise noted    ALLERGIES & MEDICATIONS  --------------------------------------------------------------------------------  Allergies    No Known Allergies    Intolerances      Standing Inpatient Medications  chlorhexidine 2% Cloths 1 Application(s) Topical <User Schedule>  doxazosin 4 milliGRAM(s) Oral <User Schedule>  epoetin cortney-epbx (RETACRIT) Injectable 20241 Unit(s) SubCutaneous every 7 days  fosphenytoin IVPB 200 milliGRAM(s) PE IV Intermittent every 12 hours  heparin   Injectable 5000 Unit(s) SubCutaneous every 8 hours  hydrALAZINE 25 milliGRAM(s) Oral every 8 hours  insulin lispro (ADMELOG) corrective regimen sliding scale   SubCutaneous every 6 hours  melatonin 6 milliGRAM(s) Oral at bedtime  mycophenolate mofetil Suspension 1000 milliGRAM(s) Enteral Tube <User Schedule>  pantoprazole  Injectable 40 milliGRAM(s) IV Push daily  senna Syrup 10 milliLiter(s) Oral at bedtime  sodium bicarbonate 1300 milliGRAM(s) Oral <User Schedule>    PRN Inpatient Medications  acetaminophen    Suspension .. 650 milliGRAM(s) Enteral Tube every 6 hours PRN      REVIEW OF SYSTEMS- unable to obtain       VITALS/PHYSICAL EXAM  --------------------------------------------------------------------------------  T(C): 36.4 (08-04-22 @ 15:00), Max: 37.2 (08-03-22 @ 19:00)  HR: 54 (08-04-22 @ 15:00) (49 - 61)  BP: 137/63 (08-04-22 @ 15:00) (118/58 - 212/86)  RR: 16 (08-04-22 @ 15:00) (12 - 27)  SpO2: 99% (08-04-22 @ 15:00) (96% - 100%)  Wt(kg): --        08-03-22 @ 07:01  -  08-04-22 @ 07:00  --------------------------------------------------------  IN: 400 mL / OUT: 1500 mL / NET: -1100 mL    08-04-22 @ 07:01  -  08-04-22 @ 15:24  --------------------------------------------------------  IN: 240 mL / OUT: 0 mL / NET: 240 mL      Physical Exam:  	Gen: on EEG monitoring, mentation altered.   	Pulm: CTA B/L  	CV: RRR, S1S2; no rub  	Abd: +BS, soft, slightly distended                      Transplant: No tenderness, swelling  	: scrotal swelling             Extremities + b/l LE and UE edema  	Neuro: confused.  	Skin: Warm, without rashes      LABS/STUDIES  --------------------------------------------------------------------------------              10.7   24.80 >-----------<  456      [08-03-22 @ 22:24]              32.4     133  |  94  |  80  ----------------------------<  56      [08-04-22 @ 09:39]  4.7   |  26  |  3.78        Ca     8.3     [08-04-22 @ 09:39]      Mg     2.2     [08-04-22 @ 09:39]      Phos  4.7     [08-04-22 @ 09:39]    TPro  5.8  /  Alb  2.1  /  TBili  0.2  /  DBili  0.2  /  AST  42  /  ALT  39  /  AlkPhos  289  [08-03-22 @ 22:24]    PT/INR: PT 14.7 , INR 1.26       [08-03-22 @ 22:24]  PTT: 32.1       [08-03-22 @ 22:24]      Creatinine Trend:  SCr 3.78 [08-04 @ 09:39]  SCr 3.37 [08-03 @ 22:24]  SCr 4.40 [08-03 @ 07:03]  SCr 4.26 [08-02 @ 23:18]  SCr 4.09 [08-02 @ 18:59]        Sirolimus (Rapamycin) Level, Serum: 5.0 ng/mL (08-03 @ 22:24)  Sirolimus (Rapamycin) Level, Serum: 6.1 ng/mL (08-02 @ 23:18)  Sirolimus (Rapamycin) Level, Serum: 6.7 ng/mL (08-01 @ 23:41)        Urinalysis - [07-25-22 @ 18:28]      Color Yellow / Appearance Slightly Turbid / SG 1.020 / pH 5.5      Gluc Negative / Ketone Negative  / Bili Negative / Urobili Negative       Blood Small / Protein 100 / Leuk Est Large / Nitrite Negative      RBC 2 / WBC 40 / Hyaline 5 / Gran  / Sq Epi  / Non Sq Epi 0 / Bacteria Many      Iron 17, TIBC 171, %sat 10      [05-02-22 @ 13:03]  Ferritin 6336      [07-27-22 @ 13:00]  PTH -- (Ca 9.2)      [02-05-22 @ 10:13]   294  HbA1c 6.0      [02-21-20 @ 08:53]  TSH 4.69      [07-06-22 @ 18:36]  Lipid: chol --, , HDL --, LDL --      [07-27-22 @ 13:00]    HBsAg Nonreact      [07-25-22 @ 11:51]  HCV 0.18, Nonreact      [07-25-22 @ 11:51]

## 2022-08-04 NOTE — CHART NOTE - NSCHARTNOTEFT_GEN_A_CORE
Neurology called given concern for seizure. Per RN, pt with R face, arm > leg shaking (low amplitude). VS notable for /86. Pt seen and examined at bedside, AAOx2 to person, place, not to time, following commands, PERRL (3mm b/l), b/l UE equally antigravity. Labs notable for WBC 24. EEG tech paged and now putting on vEEG. Neurology called given concern for seizure.   Per RN, pt with R face, arm > leg shaking (low amplitude) and VS notable for /86, s/p hydralazine and SBP 160s. Pt seen and examined at bedside, AAOx2 to person, place, not to time, following commands, PERRL (3mm b/l), b/l UE equally antigravity (L>R). Labs notable for WBC 24. EEG tech paged and now putting on vEEG.    Another episode at 12:50am of facial twitching while on EEG and pt given 4mg versed. Pt seen and examined at bedside, agitated and not following commands, PERRL (3mm b/l), RUE 2-3/5 (has documented R hemiparesis at baseline)    Impression: R sided shaking and facial twitching possibly 2/2 L focal seizures. 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 worsening PRES in setting of , cannot r/o new infarct.    Recommendations:  [] BP control; avoid HTN  [] CTH non contrast when able  []     Case discussed with stroke fellow Dr. Wm Mcfadden  Case discussed with epilepsy fellow Dr. Pratima Teran Neurology called given concern for seizure.   Per RN, pt with R face, arm > leg shaking (low amplitude) and VS notable for /86, s/p hydralazine and SBP 160s. Pt seen and examined at bedside, AAOx2 to person, place, not to time, following commands, PERRL (3mm b/l), b/l UE equally antigravity (L>R). Labs notable for WBC 24. EEG tech paged and now putting on vEEG.    Another episode at 12:50am of facial twitching while on EEG and pt given 4mg versed. Pt seen and examined at bedside, agitated and not following commands, PERRL (3mm b/l), RUE 2-3/5 (has documented R hemiparesis at baseline). Per epilepsy fellow, EEG still being installed at the time of seizure however suspicion for clinical seizure, no EEG correlate.     Impression: R sided shaking and facial twitching possibly 2/2 L focal seizures. 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 worsening PRES in setting of , cannot r/o new infarct especially in setting of full dose AC being held    Recommendations:  [] BP control; avoid HTN  [] CTH non contrast when able  [] phenytoin level AM and CMP PRIOR to AM dose  [] check EKG  [] monitor clinically for seizures and press event capture button  [] If any seizure activity noted, please note: time, if upper/lower or focal onset symptoms (e.g. head turn, eye deviation, upper/lower tonic/clonic arm initially), vital sign derangements, airway protection, urinary or bowel incontinence, duration of seizure, tongue bite, and/or post-ictal time to return of baseline.        Case discussed with stroke fellow Dr. Wm Mcfadden  Case discussed with epilepsy fellow Dr. Pratima Teran Neurology called given concern for seizure.     Per RN, pt with R face, arm > leg shaking (low amplitude) and VS notable for /86, s/p hydralazine and SBP 160s. Pt seen and examined at bedside, AAOx2 to person, place, not to time, following commands, PERRL (3mm b/l), b/l UE equally antigravity (L>R). Labs notable for WBC 24. EEG tech paged and now putting on vEEG.    Another episode at 12:50am of facial twitching while on EEG and pt given 4mg versed. Pt seen and examined at bedside, agitated and not following commands, PERRL (3mm b/l), RUE 2-3/5 (has documented R hemiparesis at baseline). Per epilepsy fellow, EEG still being installed at the time of seizure however suspicion for clinical seizure, no EEG correlate.     Impression: R sided shaking and facial twitching possibly 2/2 L focal seizures. 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 worsening PRES in setting of , cannot r/o new infarct especially in setting of full dose AC being held    Recommendations:  [] BP control; avoid HTN  [] CTH non contrast when able  [] phenytoin level AM and CMP PRIOR to AM dose  [] check EKG  [] monitor clinically for seizures and press event capture button  [] If any seizure activity noted, please note: time, if upper/lower or focal onset symptoms (e.g. head turn, eye deviation, upper/lower tonic/clonic arm initially), vital sign derangements, airway protection, urinary or bowel incontinence, duration of seizure, tongue bite, and/or post-ictal time to return of baseline.    [] clarify indication for holding anticoagulation      Case discussed with stroke fellow Dr. Wm Mcfadden  Case discussed with epilepsy fellow Dr. Pratima Teran

## 2022-08-04 NOTE — PROGRESS NOTE ADULT - SUBJECTIVE AND OBJECTIVE BOX
Follow Up:  Leukocytosis    Interval History: patient developed seizure overnight. afebrile overnight.     REVIEW OF SYSTEMS  [  ] ROS unobtainable because:    [ x ] All other systems negative except as noted below    Constitutional:  [ ] fever [ ] chills  [ ] weight loss  [ ] weakness  Skin:  [ ] rash [ ] phlebitis	  Eyes: [ ] icterus [ ] pain  [ ] discharge	  ENMT: [ ] sore throat  [ ] thrush [ ] ulcers [ ] exudates  Respiratory: [ ] dyspnea [ ] hemoptysis [ ] cough [ ] sputum	  Cardiovascular:  [ ] chest pain [ ] palpitations [ ] edema	  Gastrointestinal:  [ ] nausea [ ] vomiting [ ] diarrhea [ ] constipation [x ] pain	  Genitourinary:  [ ] dysuria [ ] frequency [ ] hematuria [ ] discharge [ ] flank pain  [ ] incontinence  Musculoskeletal:  [ ] myalgias [ ] arthralgias [ ] arthritis  [ ] back pain  Neurological:  [ ] headache [ ] seizures  [ ] confusion/altered mental status    Allergies  No Known Allergies        ANTIMICROBIALS:  vancomycin  IVPB 1000 once      OTHER MEDS:  MEDICATIONS  (STANDING):  acetaminophen    Suspension .. 650 every 6 hours PRN  doxazosin 4 <User Schedule>  epoetin cortney-epbx (RETACRIT) Injectable 78364 every 7 days  fosphenytoin IVPB 200 every 12 hours  heparin   Injectable 5000 every 8 hours  hydrALAZINE 25 every 8 hours  insulin lispro (ADMELOG) corrective regimen sliding scale  every 6 hours  melatonin 6 at bedtime  mycophenolate mofetil Suspension 1000 <User Schedule>  pantoprazole  Injectable 40 daily  senna Syrup 10 at bedtime      Vital Signs Last 24 Hrs  T(C): 36.4 (04 Aug 2022 15:00), Max: 37.2 (03 Aug 2022 19:00)  T(F): 97.5 (04 Aug 2022 15:00), Max: 99 (03 Aug 2022 19:00)  HR: 54 (04 Aug 2022 15:00) (49 - 61)  BP: 137/63 (04 Aug 2022 15:00) (118/58 - 212/86)  BP(mean): 91 (04 Aug 2022 15:00) (80 - 123)  RR: 16 (04 Aug 2022 15:00) (12 - 27)  SpO2: 99% (04 Aug 2022 15:00) (96% - 100%)    Parameters below as of 04 Aug 2022 15:00  Patient On (Oxygen Delivery Method): nasal cannula  O2 Flow (L/min): 1      PHYSICAL EXAMINATION:  General: Alert and Awake, NAD  Cardiac: RRR, No M/R/G  Resp: CTAB, No Wh/Rh/Ra  Abdomen: RLQ with erythema and induration overlying the renal transplant site. Tenderness to palpation over the renal transplant, No HSM, No rigidity or guarding  MSK: No LE edema. No Calf tenderness  Skin: No rashes or lesions. Skin is warm and dry to the touch.   Neuro: Alert and Awake. CN 2-12 Grossly intact. Moves all four extremities spontaneously.  Psych: Calm, Pleasant, Cooperative                          10.7   24.80 )-----------( 456      ( 03 Aug 2022 22:24 )             32.4       08-04    133<L>  |  94<L>  |  80<H>  ----------------------------<  56<L>  4.7   |  26  |  3.78<H>    Ca    8.3<L>      04 Aug 2022 09:39  Phos  4.7     08-04  Mg     2.2     08-04    TPro  5.8<L>  /  Alb  2.1<L>  /  TBili  0.2  /  DBili  0.2  /  AST  42<H>  /  ALT  39  /  AlkPhos  289<H>  08-03          MICROBIOLOGY:  v  .Blood Blood-Peripheral  07-30-22   No Growth Final  --  --      .Blood Blood-Peripheral  07-30-22   No Growth Final  --  --      .Blood Blood  07-27-22   No growth  --  --      Catheterized Catheterized  07-25-22   50,000 - 99,000 CFU/mL Klebsiella pneumoniae ESBL  --  Klebsiella pneumoniae ESBL      .Blood Blood  07-25-22   No Growth Final  --  --      .Blood Blood  07-25-22   No Growth Final  --  --      .Blood Blood  07-15-22   No Growth Final  --  --      .Blood Blood  07-15-22   No Growth Final  --  --      Clean Catch Clean Catch (Midstream)  07-13-22   50,000 - 99,000 CFU/mL Klebsiella pneumoniae ESBL  --  Klebsiella pneumoniae ESBL    RADIOLOGY:    <The imaging below has been reviewed and visualized by me independently. Findings as detailed in report below>    < from: US Trans Kidney w/ Doppler, Right (08.02.22 @ 12:32) >  IMPRESSION:  No hydronephrosis.  Patent vasculature.  Persistently elevated resistive indices.  Redemonstration of urothelial thickening of the collecting system.    < end of copied text >

## 2022-08-04 NOTE — PROGRESS NOTE ADULT - PROBLEM SELECTOR PLAN 1
- Monitor glucose Q6H while NPO status   - Continue with low dose sliding scale Q6H for now due to concern for hypoglycemia  - Now starting TF, if finger stick >180, please start NPH 2 units Q6H (patient will be receiving nepro with carb steady which contains about 72 g of carbs in 24 hour period. Assuming insulin to carb ratio of 1:10, will be needing about 7 units of insulin). If tube feeding rate increase, can adjust NPH as needed.     Discharge planning:  - plan to rehab, likely will need basal bolus insulin final doses as above if FBG at goal  Titrate further at rehab as necessary .   - Outpatient f/u with Endocrinologist Dr. Lincoln. [address] [facility] [address]. Phone  Needs apt at time of discharge  - Needs optho/renal/cardiac f/u as out pt  - Make sure pt has insulin and DM supplies at time of discharge. - Monitor glucose Q6H while NPO status   - Continue with low dose sliding scale Q6H for now due to concern for hypoglycemia  - Started on TF, if finger stick >180, please start NPH 2 units Q6H (patient will be receiving nepro with carb steady which contains about 72 g of carbs in 24 hour period. Assuming insulin to carb ratio of 1:10, will be needing about 7 units of insulin). If tube feeding rate increase, can up titrate NPH as needed.     Discharge planning:  - plan to rehab, likely will need basal bolus insulin final doses depending on clinica course  - Outpatient f/u with Endocrinologist Dr. Lincoln. [address] [facility] [address]. Phone  Needs apt at time of discharge  - Needs optho/renal/cardiac f/u as out pt  - Make sure pt has insulin and DM supplies at time of discharge.

## 2022-08-04 NOTE — PROGRESS NOTE ADULT - ASSESSMENT
66 year old Male with PMHx ESRD s/p PermCath removal 5/10/22 s/p Rt DDRT 4/21/22,  CVA (2019), Afib on apixaban, seizure activity, CAD s/p stents, CABG (2020), HTN, HLD, DM on insulin, gastric/duodenal ulcer, recent hospitalization 4/28/22 -5/10/22 with weakness/anemia found to have perinephric hematoma requiring evacuation and repair of bleeding arterial anastomosis who presented to [facility] for status epilepticus requiring intubation for hypoxic respiratory failure.     Subsequently developed continued seizures and hypothermia -> resolved   Was initiated on empiric antibiotics  s/p LP which was not suggestive of infectious process  Blood, urine and sputum cultures without positive sample    U/A (7/12) 161 WBC  UCx (7/13) 50-99K ESBL Klebsiella   s/p 3 days of Ertapenem    UA (7/25) with 40 WBC  UCx (7/25) 50-99k ESBL Klebsiella     Transaminitis trending down   Fever curve improved   S/p HD via L chest HD cath    RUQ (7/25) with hepatomegaly   CT c/a/p (7/25) with only small perinephric fluid collection, small volume ascites.  Doppler US R Kidney (7/27) with mild thickening of collecting system and ureter and small   Doppler US R Kidney (7/29) with fullness of collecting system and continued urothelial thickening.     RVP (7/25) Negative  CMV PCR (7/22) Negative  EBV PCR (7/25) Negative   HBV PCR (7/25) Negative   HHV-6 PCR (7/25) POSITIVE   Parvovirus IgM and PCR (7/26) Negative   HSV 1/2 PCR (7/26) Negative   Adenovirus PCR (7/26) Negative    #Rash (Erythema overlying Renal Transplant), Leukocytosis,  Renal Transplant Recipient  Developed seizure again overnight 8/3 > 8/4  Significant increase in leukocytosis  Some of the leukocytosis can be attributed to corticosteroids and stress-induced rise  However, now with increase in induration and now erythema of the abdominal wall overlying the renal transplant site  --Recommend Vancomycin 1g IV x1 and continue by level  --Recommend repeating either Transplant Kidney US or CT A/P  --Agree with discontinuing Ertapenem in context of seizure and completing course for Transplant Pyelonephritis  --Continue to follow CBC with diff  --Continue to follow temperature curve  --Follow up on preliminary blood cultures (repeats sent today)    #Abnormal Laboratory Test (HHV6 PCR)  HHV6 PCR minimally elevated; likely chromosomal integration  --Monitor off of therapy for this indication    I will continue to follow. Please feel free to contact me with any further questions.    Carlton Hoover M.D.  [facility] Division of Infectious Disease  8AM-5PM Monday - Friday: Available on [employer] Teams  After Hours and Holidays (or if no response on [employer] Teams): Please contact the Infectious Diseases Office at [phone]    The above assessment and plan were discussed with Saeid, Transplant Surgery PA

## 2022-08-04 NOTE — PROGRESS NOTE ADULT - SUBJECTIVE AND OBJECTIVE BOX
Chief Complaint: Endocrine consult requested for management of T2DM    INTERVAL HX: Pt remains in ICU due to sepsis and transaminitis and now rejection on high steroid doses (MTP 500mg 7/30> 250mg 7/31, 8/1 and 125mg 8/2, 60 mg 1 pm 8/3, prednisone 40 mg @ 11 am 8/4.  Currently having hypoglycemia to the 50s not on insulin regimen here. Overnight, found to have multiple seizure activities.     Review of Systems:  General: As above  Cardiovascular: No chest pain, palpitations  Respiratory: No SOB, no cough  GI: No nausea, vomiting, abdominal pain  Endocrine: No S&Sx of hypoglycemia  Neuro: + HA    Allergies    No Known Allergies    Intolerances    MEDICATIONS  (STANDING):  chlorhexidine 2% Cloths 1 Application(s) Topical <User Schedule>  doxazosin 4 milliGRAM(s) Oral <User Schedule>  epoetin cortney-epbx (RETACRIT) Injectable 40514 Unit(s) SubCutaneous every 7 days  fosphenytoin IVPB 200 milliGRAM(s) PE IV Intermittent every 12 hours  heparin   Injectable 5000 Unit(s) SubCutaneous every 8 hours  hydrALAZINE 25 milliGRAM(s) Oral every 8 hours  insulin lispro (ADMELOG) corrective regimen sliding scale   SubCutaneous every 6 hours  melatonin 6 milliGRAM(s) Oral at bedtime  mycophenolate mofetil Suspension 1000 milliGRAM(s) Enteral Tube <User Schedule>  pantoprazole  Injectable 40 milliGRAM(s) IV Push daily  senna Syrup 10 milliLiter(s) Oral at bedtime  sirolimus Solution 0.5 milliGRAM(s) Oral once  sodium bicarbonate 1300 milliGRAM(s) Oral <User Schedule>      PHYSICAL EXAM:  ICU Vital Signs Last 24 Hrs  T(C): 36.2 (04 Aug 2022 11:00), Max: 37.2 (03 Aug 2022 19:00)  T(F): 97.2 (04 Aug 2022 11:00), Max: 99 (03 Aug 2022 19:00)  HR: 54 (04 Aug 2022 12:00) (49 - 61)  BP: 134/71 (04 Aug 2022 12:00) (118/58 - 212/86)  BP(mean): 97 (04 Aug 2022 12:00) (80 - 123)  ABP: --  ABP(mean): --  RR: 14 (04 Aug 2022 12:00) (12 - 27)  SpO2: 97% (04 Aug 2022 12:00) (96% - 100%)    O2 Parameters below as of 04 Aug 2022 11:00  Patient On (Oxygen Delivery Method): nasal cannula  O2 Flow (L/min): 1        GENERAL: Male laying in bed.  Appears uncomfortable due to HA  Abdomen: Soft, nontender, non distended  Extremities: Warm, trace edema in LEs  NEURO: Answers yes/no to simple questions      LABS:  POCT Blood Glucose.: 90 mg/dL (04 Aug 2022 11:28)  POCT Blood Glucose.: 67 mg/dL (04 Aug 2022 10:54)  POCT Blood Glucose.: 53 mg/dL (04 Aug 2022 10:28)  POCT Blood Glucose.: 50 mg/dL (04 Aug 2022 10:27)  POCT Blood Glucose.: 85 mg/dL (04 Aug 2022 07:12)  POCT Blood Glucose.: 125 mg/dL (04 Aug 2022 01:44)  POCT Blood Glucose.: 188 mg/dL (03 Aug 2022 21:32)  POCT Blood Glucose.: 145 mg/dL (03 Aug 2022 17:44)  POCT Blood Glucose.: 74 mg/dL (08-03-22 @ 08:36)  POCT Blood Glucose.: 100 mg/dL (08-03-22 @ 04:21)  POCT Blood Glucose.: 99 mg/dL (08-03-22 @ 04:02)  POCT Blood Glucose.: 113 mg/dL (08-03-22 @ 03:02)  POCT Blood Glucose.: 139 mg/dL (08-03-22 @ 02:01)  POCT Blood Glucose.: 157 mg/dL (08-03-22 @ 01:13)  POCT Blood Glucose.: 195 mg/dL (08-03-22 @ 00:11)  POCT Blood Glucose.: 219 mg/dL (08-02-22 @ 23:12)  POCT Blood Glucose.: 241 mg/dL (08-02-22 @ 22:24)  POCT Blood Glucose.: 261 mg/dL (08-02-22 @ 21:15)  POCT Blood Glucose.: 288 mg/dL (08-02-22 @ 20:05)  POCT Blood Glucose.: 278 mg/dL (08-02-22 @ 19:23)  POCT Blood Glucose.: 289 mg/dL (08-02-22 @ 18:54)  POCT Blood Glucose.: 268 mg/dL (08-02-22 @ 18:12)  POCT Blood Glucose.: 278 mg/dL (08-02-22 @ 17:07)  POCT Blood Glucose.: 259 mg/dL (08-02-22 @ 11:51)  POCT Blood Glucose.: 208 mg/dL (08-02-22 @ 07:50)  POCT Blood Glucose.: 149 mg/dL (08-01-22 @ 22:02)  POCT Blood Glucose.: 115 mg/dL (08-01-22 @ 21:19)  POCT Blood Glucose.: 94 mg/dL (08-01-22 @ 21:00)  POCT Blood Glucose.: 114 mg/dL (08-01-22 @ 20:02)  POCT Blood Glucose.: 131 mg/dL (08-01-22 @ 19:12)  POCT Blood Glucose.: 126 mg/dL (08-01-22 @ 18:14)  POCT Blood Glucose.: 125 mg/dL (08-01-22 @ 17:25)  POCT Blood Glucose.: 135 mg/dL (08-01-22 @ 16:36)  POCT Blood Glucose.: 161 mg/dL (08-01-22 @ 15:31)  POCT Blood Glucose.: 140 mg/dL (08-01-22 @ 14:28)  POCT Blood Glucose.: 132 mg/dL (08-01-22 @ 13:25)  POCT Blood Glucose.: 124 mg/dL (08-01-22 @ 12:26)  POCT Blood Glucose.: 125 mg/dL (08-01-22 @ 11:22)  POCT Blood Glucose.: 133 mg/dL (08-01-22 @ 10:15)  POCT Blood Glucose.: 133 mg/dL (08-01-22 @ 09:08)  POCT Blood Glucose.: 142 mg/dL (08-01-22 @ 09:05)  POCT Blood Glucose.: 173 mg/dL (08-01-22 @ 07:55)  POCT Blood Glucose.: 190 mg/dL (08-01-22 @ 06:53)  POCT Blood Glucose.: 194 mg/dL (08-01-22 @ 06:11)  POCT Blood Glucose.: 226 mg/dL (08-01-22 @ 05:08)  POCT Blood Glucose.: 257 mg/dL (08-01-22 @ 04:07)  POCT Blood Glucose.: 288 mg/dL (08-01-22 @ 03:01)  POCT Blood Glucose.: 301 mg/dL (08-01-22 @ 02:06)  POCT Blood Glucose.: 354 mg/dL (08-01-22 @ 00:06)  POCT Blood Glucose.: 402 mg/dL (07-31-22 @ 22:36)  POCT Blood Glucose.: 448 mg/dL (07-31-22 @ 20:25)                            10.2   14.06 )-----------( 446      ( 02 Aug 2022 23:18 )             31.3     08-04    133<L>  |  94<L>  |  80<H>  ----------------------------<  56<L>  4.7   |  26  |  3.78<H>    Ca    8.3<L>      04 Aug 2022 09:39  Phos  4.7     08-04  Mg     2.2     08-04    TPro  5.8<L>  /  Alb  2.1<L>  /  TBili  0.2  /  DBili  0.2  /  AST  42<H>  /  ALT  39  /  AlkPhos  289<H>  08-03           Thyroid Function Tests:  07-06 @ 18:36 TSH 4.69 FreeT4 -- T3 43 Anti TPO -- Anti Thyroglobulin Ab -- TSI --      A1C with Estimated Average Glucose Result: 6.0 % (06-30-22 @ 05:49)      Estimated Average Glucose: 126 mg/dL (06-30-22 @ 05:49)        07-27 Chol -- Direct LDL -- LDL calculated -- HDL -- Trig 118               Chief Complaint: Endocrine consult requested for management of T2DM    INTERVAL HX: Pt remains in ICU due to sepsis and transaminitis and now rejection on high steroid doses (MTP 500mg 7/30> 250mg 7/31, 8/1 and 125mg 8/2, 60 mg 1 pm 8/3, prednisone 40 mg @ 11 am 8/4.  Currently having hypoglycemia to the 50s not on insulin regimen here. Overnight, found to have multiple seizure activities. Started on tube feed nephro with carb steady.     Review of Systems:  General: lethargic, mental status waxes and wanes  Cardiovascular: No chest pain   Respiratory: No SOB, no cough  GI: No nausea, vomiting, NGT in place    Allergies    No Known Allergies    Intolerances    MEDICATIONS  (STANDING):  chlorhexidine 2% Cloths 1 Application(s) Topical <User Schedule>  doxazosin 4 milliGRAM(s) Oral <User Schedule>  epoetin cortney-epbx (RETACRIT) Injectable 26788 Unit(s) SubCutaneous every 7 days  fosphenytoin IVPB 200 milliGRAM(s) PE IV Intermittent every 12 hours  heparin   Injectable 5000 Unit(s) SubCutaneous every 8 hours  hydrALAZINE 25 milliGRAM(s) Oral every 8 hours  insulin lispro (ADMELOG) corrective regimen sliding scale   SubCutaneous every 6 hours  melatonin 6 milliGRAM(s) Oral at bedtime  mycophenolate mofetil Suspension 1000 milliGRAM(s) Enteral Tube <User Schedule>  pantoprazole  Injectable 40 milliGRAM(s) IV Push daily  senna Syrup 10 milliLiter(s) Oral at bedtime  sirolimus Solution 0.5 milliGRAM(s) Oral once  sodium bicarbonate 1300 milliGRAM(s) Oral <User Schedule>      PHYSICAL EXAM:  ICU Vital Signs Last 24 Hrs  T(C): 36.2 (04 Aug 2022 11:00), Max: 37.2 (03 Aug 2022 19:00)  T(F): 97.2 (04 Aug 2022 11:00), Max: 99 (03 Aug 2022 19:00)  HR: 54 (04 Aug 2022 12:00) (49 - 61)  BP: 134/71 (04 Aug 2022 12:00) (118/58 - 212/86)  BP(mean): 97 (04 Aug 2022 12:00) (80 - 123)  ABP: --  ABP(mean): --  RR: 14 (04 Aug 2022 12:00) (12 - 27)  SpO2: 97% (04 Aug 2022 12:00) (96% - 100%)    O2 Parameters below as of 04 Aug 2022 11:00  Patient On (Oxygen Delivery Method): nasal cannula  O2 Flow (L/min): 1    GENERAL: Male laying in bed.  Lethargic  Abdomen: Soft, nontender, non distended  Extremities: Warm, trace edema in LEs  NEURO: Answers yes/no to simple questions      LABS:  POCT Blood Glucose.: 90 mg/dL (04 Aug 2022 11:28)  POCT Blood Glucose.: 67 mg/dL (04 Aug 2022 10:54)  POCT Blood Glucose.: 53 mg/dL (04 Aug 2022 10:28)  POCT Blood Glucose.: 50 mg/dL (04 Aug 2022 10:27)  POCT Blood Glucose.: 85 mg/dL (04 Aug 2022 07:12)  POCT Blood Glucose.: 125 mg/dL (04 Aug 2022 01:44)  POCT Blood Glucose.: 188 mg/dL (03 Aug 2022 21:32)  POCT Blood Glucose.: 145 mg/dL (03 Aug 2022 17:44)  POCT Blood Glucose.: 74 mg/dL (08-03-22 @ 08:36)  POCT Blood Glucose.: 100 mg/dL (08-03-22 @ 04:21)  POCT Blood Glucose.: 99 mg/dL (08-03-22 @ 04:02)  POCT Blood Glucose.: 113 mg/dL (08-03-22 @ 03:02)  POCT Blood Glucose.: 139 mg/dL (08-03-22 @ 02:01)  POCT Blood Glucose.: 157 mg/dL (08-03-22 @ 01:13)  POCT Blood Glucose.: 195 mg/dL (08-03-22 @ 00:11)  POCT Blood Glucose.: 219 mg/dL (08-02-22 @ 23:12)  POCT Blood Glucose.: 241 mg/dL (08-02-22 @ 22:24)  POCT Blood Glucose.: 261 mg/dL (08-02-22 @ 21:15)  POCT Blood Glucose.: 288 mg/dL (08-02-22 @ 20:05)  POCT Blood Glucose.: 278 mg/dL (08-02-22 @ 19:23)  POCT Blood Glucose.: 289 mg/dL (08-02-22 @ 18:54)  POCT Blood Glucose.: 268 mg/dL (08-02-22 @ 18:12)  POCT Blood Glucose.: 278 mg/dL (08-02-22 @ 17:07)  POCT Blood Glucose.: 259 mg/dL (08-02-22 @ 11:51)  POCT Blood Glucose.: 208 mg/dL (08-02-22 @ 07:50)  POCT Blood Glucose.: 149 mg/dL (08-01-22 @ 22:02)  POCT Blood Glucose.: 115 mg/dL (08-01-22 @ 21:19)  POCT Blood Glucose.: 94 mg/dL (08-01-22 @ 21:00)  POCT Blood Glucose.: 114 mg/dL (08-01-22 @ 20:02)  POCT Blood Glucose.: 131 mg/dL (08-01-22 @ 19:12)  POCT Blood Glucose.: 126 mg/dL (08-01-22 @ 18:14)  POCT Blood Glucose.: 125 mg/dL (08-01-22 @ 17:25)  POCT Blood Glucose.: 135 mg/dL (08-01-22 @ 16:36)  POCT Blood Glucose.: 161 mg/dL (08-01-22 @ 15:31)  POCT Blood Glucose.: 140 mg/dL (08-01-22 @ 14:28)  POCT Blood Glucose.: 132 mg/dL (08-01-22 @ 13:25)  POCT Blood Glucose.: 124 mg/dL (08-01-22 @ 12:26)  POCT Blood Glucose.: 125 mg/dL (08-01-22 @ 11:22)  POCT Blood Glucose.: 133 mg/dL (08-01-22 @ 10:15)  POCT Blood Glucose.: 133 mg/dL (08-01-22 @ 09:08)  POCT Blood Glucose.: 142 mg/dL (08-01-22 @ 09:05)  POCT Blood Glucose.: 173 mg/dL (08-01-22 @ 07:55)  POCT Blood Glucose.: 190 mg/dL (08-01-22 @ 06:53)  POCT Blood Glucose.: 194 mg/dL (08-01-22 @ 06:11)  POCT Blood Glucose.: 226 mg/dL (08-01-22 @ 05:08)  POCT Blood Glucose.: 257 mg/dL (08-01-22 @ 04:07)  POCT Blood Glucose.: 288 mg/dL (08-01-22 @ 03:01)  POCT Blood Glucose.: 301 mg/dL (08-01-22 @ 02:06)  POCT Blood Glucose.: 354 mg/dL (08-01-22 @ 00:06)  POCT Blood Glucose.: 402 mg/dL (07-31-22 @ 22:36)  POCT Blood Glucose.: 448 mg/dL (07-31-22 @ 20:25)                            10.2   14.06 )-----------( 446      ( 02 Aug 2022 23:18 )             31.3     08-04    133<L>  |  94<L>  |  80<H>  ----------------------------<  56<L>  4.7   |  26  |  3.78<H>    Ca    8.3<L>      04 Aug 2022 09:39  Phos  4.7     08-04  Mg     2.2     08-04    TPro  5.8<L>  /  Alb  2.1<L>  /  TBili  0.2  /  DBili  0.2  /  AST  42<H>  /  ALT  39  /  AlkPhos  289<H>  08-03           Thyroid Function Tests:  07-06 @ 18:36 TSH 4.69 FreeT4 -- T3 43 Anti TPO -- Anti Thyroglobulin Ab -- TSI --      A1C with Estimated Average Glucose Result: 6.0 % (06-30-22 @ 05:49)      Estimated Average Glucose: 126 mg/dL (06-30-22 @ 05:49)        07-27 Chol -- Direct LDL -- LDL calculated -- HDL -- Trig 118

## 2022-08-04 NOTE — CHART NOTE - NSCHARTNOTEFT_GEN_A_CORE
Notified by SICU that pt had another focal seizure lasting 3 minutes with R face, arm, leg shaking at 5:15am. Per discussion with epilepsy attending Dr. Barbara Allred, recommend increasing phenytoin 100mg TID to fosphenytoin 200mg BID (pt unable to take PO at this time) and adding fycompa 4mg daily

## 2022-08-04 NOTE — PROGRESS NOTE ADULT - SUBJECTIVE AND OBJECTIVE BOX
Transplant Surgery - Multidisciplinary Progress Note  --------------------------------------------------------------  DDRT     4/21/2022             Present:  Patient seen with multidisciplinary team including Transplant Surgeon: Dr. David,  Nephrologist: Dr. Mary Lomeli, Pharmacist: Marika Wayne, Dr. Gimenez ([facility]), SICU team. Disciplines not in attendance will be notified of the plan.     66M with PMH: DM type II, HTN, CAD s/p CABG in 2020, AFib on Eliquis, CVA in 2019 due to Afib, Seizure d/o following CVA, last episode was on 4/8/22, h/o GIB in 2/2022 EGD with duodenal ulcer.  ESRD due to DM was on HD since 2019.     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  Donor was 58 , KDPI 82%, DCD, single vessels and ureter, HLA mismatch 1, 2, 2. No DSA, cPRA 0%. 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 Re-admitted in May for anemia in the setting of large perinephric hematoma s/p evacuation and repair of arterial anastomosis on 5/2/22. Intra operative biopsy showed no rejection, Creatinine ranging ~2mg/dL.    In Rehab:  He was recently dx with klebsiella UTI with Ertapenem - then switched to Levaquin    He also had parainfluenza pneumonia. Was at rehab progressing well.      Hospital course:  - 6/10: transferred from rehab facility for seizure status epilepticus. Intubated for respiratory protection and transferred to MICU.  EEG showed no seizure activity. Neuro consulted.   - 6/11: Extubated. Found to have R pleural effusion. s/p Thoracentesis 1.3L. Eliquis changed to heparin drip.  Post procedure drop in H&H. 5u PRBC, 2 u FFP.   - 6/11 evening: Transferred to SICU from MICU for further care in setting of hypoxia, significant R pleural effusion, anemia and hemodynamic instability  - 6/11 Intubated at 9pm and sedated on Precedex.  CT placed, 600ml blood drained.  1L total overnight, started pressors  - 6/11 Received Protamine for PTT >100 (was on Heparin drip started for AF), 2 u FFP and 5u PRBC total  - 6/13 s/p VATS 2.2L old blood removed; second chest tube placed  - 6/18-19: chest tubes removed  - 6/21: ?PRES vs. chronic ischemic changes on MRI, off Envarsus, switched to Belatacept 6/23 with good graft function  - 6/25: Tx to SICU for refractory seizures, improved with IV antiepileptic regimen  - 7/10: Downgraded from SICU to floor.   - 7/11: OR-->URETERAL STENT REMOVED  - 7/15: ESBL Kleb UTI (50-90K), completed Ertapenem x 3 Days  - 7/25: Tx to SICU for Sepsis/ elevated LFTS  - 7/27: Shiley placed for HD  - 7/28: ongoing fevers -> started Ertapenem/Fluc  - 7/29: HD restarted + 1U PRBC.  IR bx with 2A rejection, started pulse steroids. LFTs have improved  - 8/4: refractory seizures    Interval events:   - o/n with acute refractory seizures.  placed on EEG with 2 seizures identified by Epilepsy Team.  Placed on FosPhenytoin IV  - AAOx0-1 with periods of lethargy  - anasarca, tolerated HD yesterday. remains anuric  - periods of hypertension, but otherwise with stable vitals    Immunosuppression  Induction:  Thymoglobulin                                        Maintenance:  - Belatacept: 6/23, 7/4, 7/8, 7/18. 8/1, now discontinued  - Now on Sirolimus as of 8/2  - MMF 1g BID  - Pred taper   - Ongoing monitoring for signs of rejection.    Potential Discharge date: pending clinical improvement.     Education:  Medications    Plan of care:  See Below           MEDICATIONS  (STANDING):  chlorhexidine 2% Cloths 1 Application(s) Topical <User Schedule>  doxazosin 4 milliGRAM(s) Oral <User Schedule>  epoetin cortney-epbx (RETACRIT) Injectable 85980 Unit(s) SubCutaneous every 7 days  fosphenytoin IVPB 200 milliGRAM(s) PE IV Intermittent every 12 hours  heparin   Injectable 5000 Unit(s) SubCutaneous every 8 hours  hydrALAZINE 25 milliGRAM(s) Oral every 8 hours  insulin lispro (ADMELOG) corrective regimen sliding scale   SubCutaneous every 6 hours  melatonin 6 milliGRAM(s) Oral at bedtime  mycophenolate mofetil Suspension 1000 milliGRAM(s) Enteral Tube <User Schedule>  pantoprazole  Injectable 40 milliGRAM(s) IV Push daily  senna Syrup 10 milliLiter(s) Oral at bedtime  sodium bicarbonate 1300 milliGRAM(s) Oral <User Schedule>    MEDICATIONS  (PRN):  acetaminophen    Suspension .. 650 milliGRAM(s) Enteral Tube every 6 hours PRN Mild Pain (1 - 3)                Vital Signs Last 24 Hrs  T(C): 36.4 (04 Aug 2022 15:00), Max: 37.2 (03 Aug 2022 19:00)  T(F): 97.5 (04 Aug 2022 15:00), Max: 99 (03 Aug 2022 19:00)  HR: 54 (04 Aug 2022 15:00) (49 - 61)  BP: 137/63 (04 Aug 2022 15:00) (118/58 - 212/86)  BP(mean): 91 (04 Aug 2022 15:00) (80 - 123)  RR: 16 (04 Aug 2022 15:00) (12 - 27)  SpO2: 99% (04 Aug 2022 15:00) (96% - 100%)    Parameters below as of 04 Aug 2022 15:00  Patient On (Oxygen Delivery Method): nasal cannula  O2 Flow (L/min): 1      I&O's Summary    03 Aug 2022 07:01  -  04 Aug 2022 07:00  --------------------------------------------------------  IN: 400 mL / OUT: 1500 mL / NET: -1100 mL    04 Aug 2022 07:01  -  04 Aug 2022 15:25  --------------------------------------------------------  IN: 240 mL / OUT: 0 mL / NET: 240 mL                              10.7   24.80 )-----------( 456      ( 03 Aug 2022 22:24 )             32.4     08-04    133<L>  |  94<L>  |  80<H>  ----------------------------<  56<L>  4.7   |  26  |  3.78<H>    Ca    8.3<L>      04 Aug 2022 09:39  Phos  4.7     08-04  Mg     2.2     08-04    TPro  5.8<L>  /  Alb  2.1<L>  /  TBili  0.2  /  DBili  0.2  /  AST  42<H>  /  ALT  39  /  AlkPhos  289<H>  08-03          Culture - Blood (collected 07-30-22 @ 06:19)  Source: .Blood Blood-Peripheral  Final Report (08-04-22 @ 11:01):    No Growth Final    Culture - Blood (collected 07-30-22 @ 06:07)  Source: .Blood Blood-Peripheral  Final Report (08-04-22 @ 11:01):    No Growth Final                   REVIEW OF SYSTEMS  --------------------------------------------------------------------------------  unable to assess, see above        PHYSICAL EXAM:  Constitutional: awake, weak, not coherent  Eyes: Anicteric  ENMT: nc/at  Respiratory: not tachypneic, non-labored on RA    Cardiovascular: normotensive, regular rate on monitor   Gastrointestinal: Soft, non distended, nontender, RLQ incisional scar healed, some erythema.  anasarca throughout  Genitourinary: anuric on HD   Extremities: SCD's in place and working bilaterally, overall with worsening edema  Vascular: Palpable dp pulses bilaterally  Neurological: AAOx0  Skin: no rashes, ulcerations or lesions  Musculoskeletal: Moving all extremities  Psychiatric: overall calm, but with  periods of agitation

## 2022-08-04 NOTE — PROGRESS NOTE ADULT - ASSESSMENT
66M with h/o DM type II, HTN, CAD s/p CABG in 2020, Afib on Eliquis, CVA in 2019 due to Afib, Seizure d/o (last episode was on 4/8/22), h/o GI bleed in 2/2022 EGD with duodenal ulcer and ESRD on HD s/p R DDRT from DCD donor on 4/21/22 complicated by DGF requiring HD until 4/29/22 who presented with status epilepticus in setting of UTI/antibiotics and sub-therapeutic valproic acid level. Transferred to SICU on 7/25 with signs of sepsis, now with refractory seizures    Seizure Disorder:  - refractory seizures o/n.  Neuro/Epilepsy Teams following.  Follow recommendations closely. continue EEG. Avoid Fycompa 2/2 agitation in past  - prior MRI head 6/27 with less concerns for PRES, likely ischemic changes  - o/n CTH 8/3 with no acute findings  - replete lytes aggressively    R DCD DDRT 4/21/22 now with 2A ACR  - 2A ACR: s/p pulse steroids, now on taper  - will continue HD MWF, with and additional session today given persistent fluid overload and anuria  - graft doppler stable, stable perinephric collection   - S/P removal of ureteral stent on 7/12  - Continue Doxazosin/Proscar for BPH  - OOB with RN/PT  - DSA negative   - ADAT    Immunosuppression:   - Belatacept: 6/23, 7/4, 7/8. Re-loaded 7/8,  as there was a gap between doses 2/2 seizures. 7/18,  last dose 8/1  - on Sirolimus as of 8/2  - continue Pred 5  - PPx: Valcyte/ bactrim on hold, will restart as able    DM  - steroid induced hyperglycemia, improving with Endocrine management    AFib/R IJ DVT   - Eliquis with ~40% less efficacy while on fosphenytoin.    - Lovenox held for ANA and transaminitis   - rate controlled  - will discuss A/C plans daily    HTN:  - Nifedipine/Cardura/Hydralazline  - off Coreg due to bradycardia    DISPO  - continue excellent SICU care

## 2022-08-04 NOTE — PROVIDER CONTACT NOTE (OTHER) - ASSESSMENT
Patient has a new change in mental status with garbled speech and unable to move Right arm. RN repeatedly tried to reorient patient but no response from patient just garbled speech. Patient will not move Right arm to any pain. Little to no effort against gravity in Right arm

## 2022-08-05 NOTE — PROGRESS NOTE ADULT - ASSESSMENT
66 year old Male with PMHx ESRD s/p PermCath removal 5/10/22 s/p Rt DDRT 4/21/22,  CVA (2019), Afib on apixaban, seizure activity, CAD s/p stents, CABG (2020), HTN, HLD, DM on insulin, gastric/duodenal ulcer, recent hospitalization 4/28/22 -5/10/22 with weakness/anemia found to have perinephric hematoma requiring evacuation and repair of bleeding arterial anastomosis who presented to [facility] for status epilepticus requiring intubation for hypoxic respiratory failure.     Subsequently developed continued seizures and hypothermia -> resolved   Was initiated on empiric antibiotics  s/p LP which was not suggestive of infectious process  Blood, urine and sputum cultures without positive sample    U/A (7/12) 161 WBC  UCx (7/13) 50-99K ESBL Klebsiella   s/p 3 days of Ertapenem    UA (7/25) with 40 WBC  UCx (7/25) 50-99k ESBL Klebsiella     Transaminitis trending down   Fever curve improved   S/p HD via L chest HD cath    RUQ (7/25) with hepatomegaly   CT c/a/p (7/25) with only small perinephric fluid collection, small volume ascites.  Doppler US R Kidney (7/27) with mild thickening of collecting system and ureter and small   Doppler US R Kidney (7/29) with fullness of collecting system and continued urothelial thickening.     RVP (7/25) Negative  CMV PCR (7/22) Negative  EBV PCR (7/25) Negative   HBV PCR (7/25) Negative   HHV-6 PCR (7/25) POSITIVE   Parvovirus IgM and PCR (7/26) Negative   HSV 1/2 PCR (7/26) Negative   Adenovirus PCR (7/26) Negative    CT A/P (8/5) with No drainable fluid collections and Right psoas retroperitoneal hematoma.    #Rash (Erythema overlying Renal Transplant), Leukocytosis,  Renal Transplant Recipient  Developed seizure again overnight 8/3 > 8/4  Significant increase in leukocytosis  Some of the leukocytosis can be attributed to corticosteroids and stress-induced rise  However, now with increase in induration and now erythema of the abdominal wall overlying the renal transplant site  --Continue Vancomycin by level  --Monitor off of further Ertapenem (completed 10 days of carbapenem for Transplant Pyelonephritis)  --Continue to follow CBC with diff  --Continue to follow temperature curve  --Follow up on preliminary blood cultures     I will be away over this upcoming weekend. Please contact the Infectious Diseases Office with any further questions or concerns.     Carlton Hoover M.D.  [facility] Division of Infectious Disease  8AM-5PM Monday - Friday: Available on [employer] Teams  After Hours and Holidays (or if no response on [employer] Teams): Please contact the Infectious Diseases Office at [phone]     The above assessment and plan were discussed with Temi, transplant surgery NP

## 2022-08-05 NOTE — PROGRESS NOTE ADULT - SUBJECTIVE AND OBJECTIVE BOX
DIABETES FOLLOW UP NOTE: Saw pt earlier today    Chief Complaint: Endocrine consult requested for management of T2DM    INTERVAL HX: Pt remains in ICU due to sepsis/transaminitis and rejection on Prednisone taper> now on Prednisone 20mg daily. Noted events in the last 2 days including seizures> NPO status and now on TFs of Nepro at 20cc/hr. Per staff, no further seizures noted in EEG and also tolerating TFs for now. Having HD today. BG levels difficult to control due to noted events including hypoglycemia yesterday ac lunch with rebound hyperglycemia at night and today while on Admelog correction scale.  Pt lethargic/sleepy at time of visit. Not answering to questions. WBC up      Review of Systems:  General: As above    Allergies    No Known Allergies    Intolerances      MEDICATIONS:  finasteride 5 milliGRAM(s) Oral daily  insulin lispro (ADMELOG) corrective regimen sliding scale   SubCutaneous every 6 hours  levothyroxine 50 MICROGram(s) Enteral Tube daily  predniSONE   Tablet 20 milliGRAM(s) Oral daily  sirolimus Solution 1.5 milliGRAM(s) Oral <User Schedule>  trimethoprim  40 mG/sulfamethoxazole 200 mG Suspension 80 milliGRAM(s) Oral daily  valGANciclovir 50 mG/mL Oral Solution 450 milliGRAM(s) Oral <User Schedule>      PHYSICAL EXAM:  VITALS: T(C): 36.7 (08-05-22 @ 15:00)  T(F): 98.1 (08-05-22 @ 15:00), Max: 99.2 (08-04-22 @ 19:00)  HR: 54 (08-05-22 @ 15:00) (47 - 54)  BP: 141/63 (08-05-22 @ 15:00) (106/51 - 167/70)  RR:  (10 - 23)  SpO2:  (96% - 100%)  Wt(kg): --  GENERAL: Male laying in bed in NAD. Son at bedside  HEENT: Having continuos EEG. NGT with TFs going at 20cc/hr   Abdomen: Soft, nontender, slightly distended  Extremities: Warm, no edema in all 4 exts  NEURO: Lethargic      LABS:  POCT Blood Glucose.: 204 mg/dL (08-05-22 @ 11:07)  POCT Blood Glucose.: 200 mg/dL (08-05-22 @ 05:08)  POCT Blood Glucose.: 152 mg/dL (08-04-22 @ 17:20)  POCT Blood Glucose.: 109 mg/dL (08-04-22 @ 14:51)  POCT Blood Glucose.: 97 mg/dL (08-04-22 @ 12:55)  POCT Blood Glucose.: 90 mg/dL (08-04-22 @ 11:28)  POCT Blood Glucose.: 67 mg/dL (08-04-22 @ 10:54)  POCT Blood Glucose.: 53 mg/dL (08-04-22 @ 10:28)  POCT Blood Glucose.: 50 mg/dL (08-04-22 @ 10:27)  POCT Blood Glucose.: 85 mg/dL (08-04-22 @ 07:12)  POCT Blood Glucose.: 125 mg/dL (08-04-22 @ 01:44)  POCT Blood Glucose.: 188 mg/dL (08-03-22 @ 21:32)  POCT Blood Glucose.: 145 mg/dL (08-03-22 @ 17:44)  POCT Blood Glucose.: 140 mg/dL (08-03-22 @ 12:13)  POCT Blood Glucose.: 74 mg/dL (08-03-22 @ 08:36)  POCT Blood Glucose.: 100 mg/dL (08-03-22 @ 04:21)  POCT Blood Glucose.: 99 mg/dL (08-03-22 @ 04:02)  POCT Blood Glucose.: 113 mg/dL (08-03-22 @ 03:02)  POCT Blood Glucose.: 139 mg/dL (08-03-22 @ 02:01)  POCT Blood Glucose.: 157 mg/dL (08-03-22 @ 01:13)  POCT Blood Glucose.: 195 mg/dL (08-03-22 @ 00:11)  POCT Blood Glucose.: 219 mg/dL (08-02-22 @ 23:12)  POCT Blood Glucose.: 241 mg/dL (08-02-22 @ 22:24)  POCT Blood Glucose.: 261 mg/dL (08-02-22 @ 21:15)  POCT Blood Glucose.: 288 mg/dL (08-02-22 @ 20:05)  POCT Blood Glucose.: 278 mg/dL (08-02-22 @ 19:23)  POCT Blood Glucose.: 289 mg/dL (08-02-22 @ 18:54)  POCT Blood Glucose.: 268 mg/dL (08-02-22 @ 18:12)  POCT Blood Glucose.: 278 mg/dL (08-02-22 @ 17:07)                            9.7    30.60 )-----------( 405      ( 05 Aug 2022 04:18 )             29.9       08-05    130<L>  |  90<L>  |  86<H>  ----------------------------<  199<H>  5.3   |  24  |  4.17<H>    eGFR: 15<L>    Ca    8.5      08-05  Mg     2.3     08-05  Phos  5.9     08-05    TPro  5.8<L>  /  Alb  2.2<L>  /  TBili  0.3  /  DBili  0.2  /  AST  29  /  ALT  28  /  AlkPhos  249<H>  08-05    Thyroid Function Tests:  07-06 @ 18:36 TSH 4.69 FreeT4 -- T3 43 Anti TPO -- Anti Thyroglobulin Ab -- TSI --      A1C with Estimated Average Glucose Result: 6.0 % (06-30-22 @ 05:49)      Estimated Average Glucose: 126 mg/dL (06-30-22 @ 05:49)        07-27 Chol -- Direct LDL -- LDL calculated -- HDL -- Trig 118

## 2022-08-05 NOTE — PROGRESS NOTE ADULT - ASSESSMENT
Mr. Castelan is a 65 y/o M with a PMH of T2DM, ESRD ( HD via R. AV fistula since 2019), HTN, CAD s/p CABG 2020, A. Fib ( On Elqiuis), CVA ( 2019 2/2 A. Fib), Seizure d/o after CVA in 2019, GIB ( 02/2022) 2/2 duodenal ulcer, and ESRD s/p DDRT 4/21/22 c/b DGF requiring HD & large perinephric hematoma 5/22 s/p evacuation w/ revision of arterial anastomosis. Pt presented from rehab on 6/10/22 2/2 status epilepticus requiring intubation for airway protection with a hospital course c/b large right pleural effusion s/p thoracentesis c/b hemothorax while being anticoagulated & requiring intubation for airway protectio. Pt transferred to SICU s/p VATS with a second chest tube placed. Pt transferred to floor 7/10 and readmitted to SICU 7/25 for AMS, found to have ESBL klebsiella UTI, ANA, with renal rejection, and renal failure requiring HD. Course further c/b 3 focal seizures s/p EEG and antiepileptic therapy with Fosphenytoin Pt remains in SICU for further hemodynamic monitoring and management.     Neuro: encephalopathy improved, hx of seizure  - EEG in place ( 8/4)  s/p multiple focal seizures  - D/c Phenytoin and start Fosphenytoin 200mg IV BID  - S/p Fycompa, previously successful antiepileptic therapy with notable psych changes   - Multimodal pain control w/Tylenol  - Melatonin with Protected sleep to improve delirium  - CT head (08/04): Negative for  acute hemorrhage  - Neurology following, reccs appreciated     Resp: No acute issues  - Maintain SpO2 >92%   - Out of bed to chair, ambulate as tolerated, and incentive spirometry to prevent atelectasis    CVS: hx of HTN, s/p CABG, AF on Eliquis  - Maintain MAP > 65  - Continue Hydralazine 25mg q8h and Amlodipine 10mg qd     GI: transaminitis  - NGT in place continue Nepro TF @ 20cc   - Protonix 40mg IV daily for ppx   - Bowel regimen with Miralax,  senna     Renal: DDRT 4/21 cb DGF, now c/b acute rejection s/p IR renal bx 7/29  - Pt Anuric, s/p HD 2L removed ( 8/4)   - Doxazosin + Proscar  - d/c Na bicarb and start Na chloride   - Continue immunosuppression with sirolimus, cellcept,a nd prednisone taper per Transplant     Heme:  - Monitor CBC and coags  - SCDs for mechanical VTe ppx   - Lovenox 60 qd for AC, will f/u Anti-Xa level     ID: ESBL klebsiella UTI (7/15 + UCx, 7/25 +UCx)  - Monitor WBC, temperature, and procalcitonin  - Blood cx negative 7/25, 7/27, 7/30  - F/u Repeat blood cx ( 08/04) with onset of seziures   - D/c Ertapenem and Fluconazole due to lower seizure threshold  - Start Vanco by level due to leukocytosis with surrounding erythema of Right groin incision per Transplant ID   - Plan for possible US or CT Abd/Pelvis of Right Kidney Transplant per Transplant ID    Endo: hx of T2DM, Hypothyroidism,   - Hypoglyemic episode to 50 ( 8/4) d/c Lantus and Premeal, continue low dose ISS   - Continue Home Synthroid    Code status: Full Code   Dispo: SICU

## 2022-08-05 NOTE — PROGRESS NOTE ADULT - ASSESSMENT
66 yr old M with Type 2 DM> unknown control due to skewed A1C 6.6% secondary to chronic anemia and s/p blood tx. On basal/bolus insulin therapy PTA. DM c/b ESRD s/p DDRT on 3/21, CVA, CAD s/p CABG, Afib on apixaban, seizure activity, HTN, HLD, h/o GI bleed in 2/2022 EGD with duodenal ulcer, discharged to [facility] rehab center after prior hospitalization, now re-admitted from [facility] for seizures c/f status epilepticus in setting of UTI. endocrine consulted for steroid induced hyperglycemia, remains home dose prednisone 5mg. Prolonged hospital course w/ ICU stay, previously intubated/extubated, previous seizures. S/p ureteral stent removal 7/12/22 pt is now on steroid pulse doses due to rejection with anuria and worsening creat> steroids been tapered down daily and pt having HD today. BG difficult to control due to recent clinical changes including seizures/ Anuria requiring HD and steroid dose changes. hypo/hyperglycemia noted in the last 24 hours. Pt only getting correction scale admelog while on low rate TFs. BG in 200s likely due to the combination of steroids and TFs. Would start low dose Lantus to keep BG goal 100 to 180s.

## 2022-08-05 NOTE — PROGRESS NOTE ADULT - SUBJECTIVE AND OBJECTIVE BOX
[facility]   MD NINA MASON MD, DO SADIE RAMIREZ NP    TEL:  OFFICE: [phone]    From 5pm-7am Answering Service [phone]    -- RENAL FOLLOW UP NOTE ---Date of Service 08-05-22 @ 13:46    Patient is a 66y old  Male who presents with a chief complaint of Status Epilepticus (05 Aug 2022 12:11)      Patient seen and examined in ICU.     VITALS:  T(F): 97.7 (08-05-22 @ 12:51), Max: 99.2 (08-04-22 @ 19:00)  HR: 51 (08-05-22 @ 13:00)  BP: 137/64 (08-05-22 @ 13:00)  RR: 17 (08-05-22 @ 13:00)  SpO2: 99% (08-05-22 @ 13:00)  Wt(kg): --    08-04 @ 07:01  -  08-05 @ 07:00  --------------------------------------------------------  IN: 1360 mL / OUT: 2400 mL / NET: -1040 mL    08-05 @ 07:01  -  08-05 @ 13:46  --------------------------------------------------------  IN: 450 mL / OUT: 0 mL / NET: 450 mL          PHYSICAL EXAM:  Constitutional: NAD  Neck: No JVD  Respiratory: CTAB, no wheezes, rales or rhonchi  Cardiovascular: S1, S2, RRR  Gastrointestinal: BS+, soft, NT/ND  Extremities: + peripheral edema    Hospital Medications:   MEDICATIONS  (STANDING):  amLODIPine   Tablet 10 milliGRAM(s) Oral daily  chlorhexidine 2% Cloths 1 Application(s) Topical <User Schedule>  doxazosin 4 milliGRAM(s) Oral <User Schedule>  epoetin cortney-epbx (RETACRIT) Injectable 29028 Unit(s) SubCutaneous every 7 days  finasteride 5 milliGRAM(s) Oral daily  fosphenytoin IVPB 200 milliGRAM(s) PE IV Intermittent every 12 hours  hydrALAZINE 25 milliGRAM(s) Oral every 8 hours  insulin lispro (ADMELOG) corrective regimen sliding scale   SubCutaneous every 6 hours  levothyroxine 50 MICROGram(s) Enteral Tube daily  melatonin 6 milliGRAM(s) Oral at bedtime  mycophenolate mofetil Suspension 1000 milliGRAM(s) Enteral Tube <User Schedule>  pantoprazole  Injectable 40 milliGRAM(s) IV Push daily  polyethylene glycol 3350 17 Gram(s) Oral daily  predniSONE   Tablet 20 milliGRAM(s) Oral daily  senna Syrup 10 milliLiter(s) Oral at bedtime  sirolimus Solution 1.5 milliGRAM(s) Oral <User Schedule>  trimethoprim  40 mG/sulfamethoxazole 200 mG Suspension 80 milliGRAM(s) Oral daily  valGANciclovir 50 mG/mL Oral Solution 450 milliGRAM(s) Oral <User Schedule>      LABS:  08-05    130<L>  |  90<L>  |  86<H>  ----------------------------<  199<H>  5.3   |  24  |  4.17<H>    Ca    8.5      05 Aug 2022 04:17  Phos  5.9     08-05  Mg     2.3     08-05    TPro  5.8<L>  /  Alb  2.2<L>  /  TBili  0.3  /  DBili  0.2  /  AST  29  /  ALT  28  /  AlkPhos  249<H>  08-05    Creatinine Trend: 4.17 <--, 3.97 <--, 3.78 <--, 3.37 <--, 4.40 <--, 4.26 <--, 4.09 <--, 3.69 <--, 3.59 <--, 2.52 <--, 2.65 <--    Albumin, Serum: 2.2 g/dL (08-05 @ 04:17)  Phosphorus Level, Serum: 5.9 mg/dL (08-05 @ 04:17)  Phosphorus Level, Serum: 5.2 mg/dL (08-04 @ 22:25)                              9.7    30.60 )-----------( 405      ( 05 Aug 2022 04:18 )             29.9     Urine Studies:  Urinalysis - [07-25-22 @ 18:28]      Color Yellow / Appearance Slightly Turbid / SG 1.020 / pH 5.5      Gluc Negative / Ketone Negative  / Bili Negative / Urobili Negative       Blood Small / Protein 100 / Leuk Est Large / Nitrite Negative      RBC 2 / WBC 40 / Hyaline 5 / Gran  / Sq Epi  / Non Sq Epi 0 / Bacteria Many      Iron 17, TIBC 171, %sat 10      [05-02-22 @ 13:03]  Ferritin 6336      [07-27-22 @ 13:00]  PTH -- (Ca 9.2)      [02-05-22 @ 10:13]   294  HbA1c 6.0      [02-21-20 @ 08:53]  TSH 4.69      [07-06-22 @ 18:36]  Lipid: chol --, , HDL --, LDL --      [07-27-22 @ 13:00]    HBsAg Nonreact      [07-25-22 @ 11:51]  HCV 0.18, Nonreact      [07-25-22 @ 11:51]      RADIOLOGY & ADDITIONAL STUDIES:

## 2022-08-05 NOTE — PROGRESS NOTE ADULT - SUBJECTIVE AND OBJECTIVE BOX
[facility] DIVISION OF KIDNEY DISEASES AND HYPERTENSION -- FOLLOW UP NOTE  --------------------------------------------------------------------------------  Chief Complaint:    24 hour events/subjective:  Patient was seen and examined at bedside      PAST HISTORY  --------------------------------------------------------------------------------  No significant changes to PMH, PSH, FHx, SHx, unless otherwise noted    ALLERGIES & MEDICATIONS  --------------------------------------------------------------------------------  Allergies    No Known Allergies    Intolerances      Standing Inpatient Medications  amLODIPine   Tablet 10 milliGRAM(s) Oral daily  chlorhexidine 2% Cloths 1 Application(s) Topical <User Schedule>  doxazosin 4 milliGRAM(s) Oral <User Schedule>  epoetin cortney-epbx (RETACRIT) Injectable 36105 Unit(s) SubCutaneous every 7 days  finasteride 5 milliGRAM(s) Oral daily  fosphenytoin IVPB 200 milliGRAM(s) PE IV Intermittent every 12 hours  hydrALAZINE 25 milliGRAM(s) Oral every 8 hours  insulin lispro (ADMELOG) corrective regimen sliding scale   SubCutaneous every 6 hours  levothyroxine 50 MICROGram(s) Enteral Tube daily  melatonin 6 milliGRAM(s) Oral at bedtime  mycophenolate mofetil Suspension 1000 milliGRAM(s) Enteral Tube <User Schedule>  pantoprazole  Injectable 40 milliGRAM(s) IV Push daily  polyethylene glycol 3350 17 Gram(s) Oral daily  predniSONE   Tablet 20 milliGRAM(s) Oral daily  senna Syrup 10 milliLiter(s) Oral at bedtime  sirolimus Solution 1.5 milliGRAM(s) Oral <User Schedule>  trimethoprim  40 mG/sulfamethoxazole 200 mG Suspension 80 milliGRAM(s) Oral daily  valGANciclovir 50 mG/mL Oral Solution 450 milliGRAM(s) Oral <User Schedule>    PRN Inpatient Medications  acetaminophen    Suspension .. 650 milliGRAM(s) Enteral Tube every 6 hours PRN      REVIEW OF SYSTEMS  --------------------------------------------------------------------------------  Gen: No fatigue, fevers/chills, weakness  Skin: No rashes  Head/Eyes/Ears/Mouth: No headache;No sore throat  Respiratory: No dyspnea, cough,   CV: No chest pain, PND, orthopnea  GI: No abdominal pain, diarrhea, constipation, nausea, vomiting  Transplant: No pain  : No increased frequency, dysuria, hematuria, nocturia  MSK: No joint pain/swelling; no back pain; no edema  Neuro: No dizziness/lightheadedness, weakness, seizures, numbness, tingling  Psych: No significant nervousness, anxiety, stress, depression    All other systems were reviewed and are negative, except as noted.    VITALS/PHYSICAL EXAM  --------------------------------------------------------------------------------  T(C): 36.4 (08-05-22 @ 11:00), Max: 37.3 (08-04-22 @ 19:00)  HR: 50 (08-05-22 @ 12:00) (47 - 56)  BP: 139/61 (08-05-22 @ 12:00) (106/51 - 167/70)  RR: 14 (08-05-22 @ 12:00) (10 - 23)  SpO2: 96% (08-05-22 @ 12:00) (96% - 100%)  Wt(kg): --        08-04-22 @ 07:01  -  08-05-22 @ 07:00  --------------------------------------------------------  IN: 1360 mL / OUT: 2400 mL / NET: -1040 mL    08-05-22 @ 07:01  -  08-05-22 @ 12:19  --------------------------------------------------------  IN: 430 mL / OUT: 0 mL / NET: 430 mL      Physical Exam:  	Gen: on EEG monitoring, mentation altered.   	Pulm: CTA B/L  	CV: RRR, S1S2; no rub  	Abd: soft, slightly distended                  Transplant: + erythema and induration overlying transplant site.+ Tender and warm.   	: ++scrotal swelling             Extremities + b/l LE and UE edema  	Neuro: confused.  	Skin: Warm, without rashes    LABS/STUDIES  --------------------------------------------------------------------------------              9.7    30.60 >-----------<  405      [08-05-22 @ 04:18]              29.9     130  |  90  |  86  ----------------------------<  199      [08-05-22 @ 04:17]  5.3   |  24  |  4.17        Ca     8.5     [08-05-22 @ 04:17]      Mg     2.3     [08-05-22 @ 04:17]      Phos  5.9     [08-05-22 @ 04:17]    TPro  5.8  /  Alb  2.2  /  TBili  0.3  /  DBili  0.2  /  AST  29  /  ALT  28  /  AlkPhos  249  [08-05-22 @ 04:17]    PT/INR: PT 15.8 , INR 1.37       [08-05-22 @ 04:18]  PTT: 37.9       [08-05-22 @ 04:18]      Creatinine Trend:  SCr 4.17 [08-05 @ 04:17]  SCr 3.97 [08-04 @ 22:25]  SCr 3.78 [08-04 @ 09:39]  SCr 3.37 [08-03 @ 22:24]  SCr 4.40 [08-03 @ 07:03]        Sirolimus (Rapamycin) Level, Serum: 5.0 ng/mL (08-03 @ 22:24)  Sirolimus (Rapamycin) Level, Serum: 6.1 ng/mL (08-02 @ 23:18)  Sirolimus (Rapamycin) Level, Serum: 6.7 ng/mL (08-01 @ 23:41)        Urinalysis - [07-25-22 @ 18:28]      Color Yellow / Appearance Slightly Turbid / SG 1.020 / pH 5.5      Gluc Negative / Ketone Negative  / Bili Negative / Urobili Negative       Blood Small / Protein 100 / Leuk Est Large / Nitrite Negative      RBC 2 / WBC 40 / Hyaline 5 / Gran  / Sq Epi  / Non Sq Epi 0 / Bacteria Many      Iron 17, TIBC 171, %sat 10      [05-02-22 @ 13:03]  Ferritin 6336      [07-27-22 @ 13:00]  PTH -- (Ca 9.2)      [02-05-22 @ 10:13]   294  HbA1c 6.0      [02-21-20 @ 08:53]  TSH 4.69      [07-06-22 @ 18:36]  Lipid: chol --, , HDL --, LDL --      [07-27-22 @ 13:00]    HBsAg Nonreact      [07-25-22 @ 11:51]  HCV 0.18, Nonreact      [07-25-22 @ 11:51]

## 2022-08-05 NOTE — PROGRESS NOTE ADULT - PROBLEM SELECTOR PLAN 1
-Test BG q 6h  -Start low dose Lantus 4 units at 6pm.   -C/w Admelog low dose correction scale q 6h while on TFs  -Please contact endo team with  changes on steroid doses/diet to make appropriate insulin doses adjustments.   Discharge  plan to rehab, c/w basal bolus insulin final doses as above if FBG at goal  Titrate further at rehab as necessary .   Outpatient f/u with Endocrinologist Dr. Lincoln. [address]. Phone  Needs apt at time of discharge  -Needs optho/renal/cardiac f/u as out pt  -Make sure pt has insulin and DM supplies at time of discharge.

## 2022-08-05 NOTE — PROGRESS NOTE ADULT - ATTENDING COMMENTS
Pt is a 66 year old male with a medical history signficant for CAD (s/p CABG), AF (on Eliquis c/b CVA c/b seizures), HTN, HLD, DM type II, hypothyroidism, GI bleed due to a duodenal ulcer, and ESRD (s/p DDRT 4/21/22 c/b DGF requiring HD) with large perinephric hematoma (s/p evacuation w/ revision of arterial anastomosis) who presented in status epilepticus. Pt was intubated for airway protection. Pt transferred to floor 7/10 and readmitted to SICU 7/25 for AMS due to an ESBL klebsiella UTI, ANA/renal rejection, and renal failure requiring HD. No further seizures overnight. A right psoas hematoma was discovered on CT and lovenox was discontinued.    Seizure disorder  Continue dilantin and multimodal pain control  Melatonin + protected sleep to improve delirium    Atelectasis  Continue ISP    HTN/CAD, H/o CABG  AFib on eliquis  Continue nifedipine  Continue hydralazine  Continue coreg    Improving transaminitis  Continue to monitor  Continue tube feeds     S/p DDRT 4/21 cb DGF, now cb acute rejection, s/p IR renal bx 7/29  Continue pulse steroids  Monitor I&Os and electrolytes  HD performed yesterday  For repeat HD today    D/c lovenox    SBL klebsiella UTI (7/15 + UCx, 7/25 +UCx)  Cont Ertapenam until 8/3 + Fluconazole  Immunosuppresion per transplant nephro     DM2  Continue SSRI Pt is a 66 year old male with a medical history signficant for CAD (s/p CABG), AF (on Eliquis c/b CVA c/b seizures), HTN, HLD, DM type II, hypothyroidism, GI bleed due to a duodenal ulcer, and ESRD (s/p DDRT 4/21/22 c/b DGF requiring HD) with large perinephric hematoma (s/p evacuation w/ revision of arterial anastomosis) who presented in status epilepticus. Pt was intubated for airway protection. Pt transferred to floor 7/10 and readmitted to SICU 7/25 for AMS due to an ESBL klebsiella UTI, ANA/renal rejection, and renal failure requiring HD. No further seizures overnight. A right psoas hematoma was discovered on CT and lovenox was discontinued.    Seizure disorder  Continue dilantin and multimodal pain control  Melatonin + protected sleep to improve delirium    Atelectasis  Continue ISP    HTN/CAD, H/o CABG  AFib on eliquis  Continue nifedipine  Continue hydralazine  Continue coreg    Improving transaminitis  Continue to monitor  Continue tube feeds     S/p DDRT 4/21 cb DGF, now cb acute rejection, s/p IR renal bx 7/29  Continue pulse steroids  Monitor I&Os and electrolytes  HD performed yesterday  For repeat HD today    D/c lovenox    SBL klebsiella UTI (7/15 + UCx, 7/25 +UCx)  Ertapenam and Fluconazole discontinued  Immunosuppresion per transplant nephro     DM2  Continue SSRI

## 2022-08-05 NOTE — PROGRESS NOTE ADULT - ASSESSMENT
66 year old male with PMH of  DM type II, HTN, CAD s/p CABG in 2020, Afib on Eliquis, CVA in 2019 due to Afib, Seizure d/o (last episode was on 4/8/22), h/o GI bleed in 2/2022 EGD with duodenal ulcer and ESRD on HD s/p R DDRT from DCD donor on 4/21/22 complicated by DGF requiring HD until 4/29/22, later had good graft function who was readmitted with status epilepticus in setting of UTI/antibiotics and sub-therapeutic valproic acid level.  Transferred to SICU on 7/25 with signs of sepsis. Biopsy from 7/29 demonstrating grade 1a rejection. Started pulse steroids (Solumedrol 500 on 7/30 -> 250 on 7/31).     1. 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 s/p pulse dose steroids. No  DSA.   IHD today as he is volume overloaded.     2. IS meds- was on Belatacept. now on Sirolimus ,  mg po bid and steroid taper.   3. AMS , seizures - has 3 episodes of seizures on 8/3. CT head was negative. on EEG and Neurology recs appreciated.  4. DM -  on Lantus and lispro.   5. ESBL klebsiella UTI (7/15 + UCx, 7/25 +UCx)- completed a course of  Ertapenam on  8/3 + Fluconazole.  5.Leuckocytiss- WBC upto 30K now. Started on Vancomycin  due to concerns for cellulitis around transplant site. CT A/P ordered.  f/u cultures

## 2022-08-05 NOTE — PROGRESS NOTE ADULT - NS ATTEND AMEND GEN_ALL_CORE FT
poorly functiong graft.  anuric on HD.   RLQ zackery-incisional cellulitis.  on Vanc.   Immuno: sirolimus, cellcept, pred

## 2022-08-05 NOTE — CHART NOTE - NSCHARTNOTEFT_GEN_A_CORE
EEG preliminary read (not final) on the initial recording hour(s) = x 8    No seizures recorded.  No changes from prior segment     [facility] EEG Reading Room Ph#: [phone]  Epilepsy Answering Service after 5PM and before 8:30AM: Ph#: [phone]

## 2022-08-05 NOTE — EEG REPORT - NS EEG TEXT BOX
MANJINDERARAMY [id number]     Study Date: 08-04-22 8AM to 8/5/22 8AM   Study hours: 24 Hrs    --------------------------------------------------------------------------------------------------  History:  CC/ HPI Patient is a 66y old  Male who presents with a chief complaint of Status Epilepticus (04 Aug 2022 12:41)    MEDICATIONS  (STANDING):  chlorhexidine 2% Cloths 1 Application(s) Topical <User Schedule>  doxazosin 4 milliGRAM(s) Oral <User Schedule>  epoetin cortney-epbx (RETACRIT) Injectable 51332 Unit(s) SubCutaneous every 7 days  fosphenytoin IVPB 200 milliGRAM(s) PE IV Intermittent every 12 hours  heparin   Injectable 5000 Unit(s) SubCutaneous every 8 hours  hydrALAZINE 25 milliGRAM(s) Oral every 8 hours  insulin lispro (ADMELOG) corrective regimen sliding scale   SubCutaneous every 6 hours  melatonin 6 milliGRAM(s) Oral at bedtime  mycophenolate mofetil Suspension 1000 milliGRAM(s) Enteral Tube <User Schedule>  pantoprazole  Injectable 40 milliGRAM(s) IV Push daily  senna Syrup 10 milliLiter(s) Oral at bedtime  sodium bicarbonate 1300 milliGRAM(s) Oral <User Schedule>    --------------------------------------------------------------------------------------------------  Study Interpretation:    [Abbreviation Key:  PDR=alpha rhythm/posterior dominant rhythm. A-P=anterior posterior.  Amplitude: ‘very low’:<20; ‘low’:20-49; ‘medium’:; ‘high’:>150uV.  Persistence for periodic/rhythmic patterns (% of epoch) ‘rare’:<1%; ‘occasional’:1-10%; ‘frequent’:10-50%; ‘abundant’:50-90%; ‘continuous’:>90%.  Persistence for sporadic discharges: ‘rare’:<1/hr; ‘occasional’:1/min-1/hr; ‘frequent’:>1/min; ‘abundant’:>1/10 sec.  RPP=rhythmic and periodic patterns; GRDA=generalized rhythmic delta activity; FIRDA=frontal intermittent GRDA; LRDA=lateralized rhythmic delta activity; TIRDA=temporal intermittent rhythmic delta activity;  LPD=PLED=lateralized periodic discharges; GPD=generalized periodic discharges; BIPDs =bilateral independent periodic discharges; Mf=multifocal; SIRPDs=stimulus induced rhythmic, periodic, or ictal appearing discharges; BIRDs=brief potentially ictal rhythmic discharges >4 Hz, lasting .5-10s; PFA (paroxysmal bursts >13 Hz or =8 Hz <10s).  Modifiers: +F=with fast component; +S=with spike component; +R=with rhythmic component.  S-B=burst suppression pattern.  Max=maximal. N1-drowsy; N2-stage II sleep; N3-slow wave sleep. SSS/BETS=small sharp spikes/benign epileptiform transients of sleep. HV=hyperventilation; PS=photic stimulation]    Daily EEG Visual Analysis  FINDINGS:      Background:  Continuous: continuous  Symmetry: symmetric  PDR: 7-8 Hz activity over the right hemisphere, with amplitude to 40 uV, that attenuated to eye opening.    Reactivity: present  Voltage: normal (between 20-150uV)  Anterior Posterior Gradient: present  Other background findings: none  Breach: absent    Background Slowing:  Generalized slowing: mild theta slowing   Focal slowing: left hemispheric theta and delta slowing, left temporal TIRDA near 1hz    State Changes:   -Drowsiness noted with increased slowing, attenuation of fast activity, vertex transients.  -Present with N2 sleep transients with symmetric spindles and K-complexes.    Sporadic Epileptiform Discharges:    None    Rhythmic and Periodic Patterns (RPPs):  None     Electrographic and Electroclinical seizures:  none     Other Clinical Events:  None    Activation Procedures:   -Hyperventilation was not performed.    -Photic stimulation was not performed.     Artifacts:  Intermittent myogenic and movement artifacts were noted.    ECG:  The heart rate on single channel ECG was predominantly between 50-60 BPM.    EEG Classification / Summary:  Abnormal EEG study  Mild focal left centroparietal slowing   Mild generalized background slowing    -----------------------------------------------------------------------------------------------------  Clinical Impression:  Left hemispheric dysfunction, temporal maximal   Mild diffuse cerebral dysfunction, not specific as to etiology.  No seizures since 8/4/22 5AM     This is a preliminary report    Robert Stokes MD  Epilepsy Fellow , [facility]  Department of Neurology, [facility]    [facility] EEG Reading Room Ph#: [phone]  Epilepsy Answering Service after 5PM and before 8:30AM: Ph#: [phone]   MANJINDERARAMY [id number]     Study Date: 08-04-22 8AM to 8/5/22 8AM   Study hours: 24 Hrs    --------------------------------------------------------------------------------------------------  History:  CC/ HPI Patient is a 66y old  Male who presents with a chief complaint of Status Epilepticus (04 Aug 2022 12:41)    MEDICATIONS  (STANDING):  chlorhexidine 2% Cloths 1 Application(s) Topical <User Schedule>  doxazosin 4 milliGRAM(s) Oral <User Schedule>  epoetin cortney-epbx (RETACRIT) Injectable 37950 Unit(s) SubCutaneous every 7 days  fosphenytoin IVPB 200 milliGRAM(s) PE IV Intermittent every 12 hours  heparin   Injectable 5000 Unit(s) SubCutaneous every 8 hours  hydrALAZINE 25 milliGRAM(s) Oral every 8 hours  insulin lispro (ADMELOG) corrective regimen sliding scale   SubCutaneous every 6 hours  melatonin 6 milliGRAM(s) Oral at bedtime  mycophenolate mofetil Suspension 1000 milliGRAM(s) Enteral Tube <User Schedule>  pantoprazole  Injectable 40 milliGRAM(s) IV Push daily  senna Syrup 10 milliLiter(s) Oral at bedtime  sodium bicarbonate 1300 milliGRAM(s) Oral <User Schedule>    --------------------------------------------------------------------------------------------------  Study Interpretation:    [Abbreviation Key:  PDR=alpha rhythm/posterior dominant rhythm. A-P=anterior posterior.  Amplitude: ‘very low’:<20; ‘low’:20-49; ‘medium’:; ‘high’:>150uV.  Persistence for periodic/rhythmic patterns (% of epoch) ‘rare’:<1%; ‘occasional’:1-10%; ‘frequent’:10-50%; ‘abundant’:50-90%; ‘continuous’:>90%.  Persistence for sporadic discharges: ‘rare’:<1/hr; ‘occasional’:1/min-1/hr; ‘frequent’:>1/min; ‘abundant’:>1/10 sec.  RPP=rhythmic and periodic patterns; GRDA=generalized rhythmic delta activity; FIRDA=frontal intermittent GRDA; LRDA=lateralized rhythmic delta activity; TIRDA=temporal intermittent rhythmic delta activity;  LPD=PLED=lateralized periodic discharges; GPD=generalized periodic discharges; BIPDs =bilateral independent periodic discharges; Mf=multifocal; SIRPDs=stimulus induced rhythmic, periodic, or ictal appearing discharges; BIRDs=brief potentially ictal rhythmic discharges >4 Hz, lasting .5-10s; PFA (paroxysmal bursts >13 Hz or =8 Hz <10s).  Modifiers: +F=with fast component; +S=with spike component; +R=with rhythmic component.  S-B=burst suppression pattern.  Max=maximal. N1-drowsy; N2-stage II sleep; N3-slow wave sleep. SSS/BETS=small sharp spikes/benign epileptiform transients of sleep. HV=hyperventilation; PS=photic stimulation]    Daily EEG Visual Analysis  FINDINGS:      Background:  Continuous: continuous  Symmetry: symmetric  PDR: 7-8 Hz activity over the right hemisphere, with amplitude to 40 uV, that attenuated to eye opening.    Reactivity: present  Voltage: normal (between 20-150uV)  Anterior Posterior Gradient: present  Other background findings: none  Breach: absent    Background Slowing:  Generalized slowing: mild theta slowing   Focal slowing: left hemispheric theta and delta slowing, left temporal TIRDA near 1hz    State Changes:   -Drowsiness noted with increased slowing, attenuation of fast activity, vertex transients.  -Present with N2 sleep transients with symmetric spindles and K-complexes.    Sporadic Epileptiform Discharges:    None    Rhythmic and Periodic Patterns (RPPs):  None     Electrographic and Electroclinical seizures:  none     Other Clinical Events:  None    Activation Procedures:   -Hyperventilation was not performed.    -Photic stimulation was not performed.     Artifacts:  Intermittent myogenic and movement artifacts were noted.    ECG:  The heart rate on single channel ECG was predominantly between 50-60 BPM.    EEG Classification / Summary:  Abnormal EEG study  Mild focal left centroparietal slowing   Mild generalized background slowing    -----------------------------------------------------------------------------------------------------  Clinical Impression:  Left hemispheric dysfunction  Mild diffuse cerebral dysfunction, not specific as to etiology.  No seizures since 8/4/22 5AM     Robert Stokes MD  Epilepsy Fellow , [facility]  Department of Neurology, [facility]    [facility] EEG Reading Room Ph#: [phone]  Epilepsy Answering Service after 5PM and before 8:30AM: Ph#: [phone]

## 2022-08-05 NOTE — PROGRESS NOTE ADULT - SUBJECTIVE AND OBJECTIVE BOX
SICU Daily Progress Note  =====================================================  Interval/Overnight Events:     - NGT placed and tube feeds started   - Hypoglycemic to 50, d/c Lantus and Premeal. Glucose increasing with Tube feeds  - EEG reported 2 Focal motor seizures originating from the left posterior temporal region with frontal evolution with mild focal left centroparietal slowing and generalized background slowing  - HD performed with 2L removal   - Started Lovenox 60 q24 hr, will follow up Xa level   - D/c Fluconazole 2/2 lowering seizure threshold   - started Vancomycin by level given uptrending leukocytosis     HPI:  Mr. Castelan is a 65 y/o M with a PMH of T2DM, ESRD ( HD via R. AV fistula since 2019), HTN, CAD s/p CABG 2020, A. Fib ( On Elqiuis), CVA ( 2019 2/2 A. Fib), Seizure d/o after CVA in 2019, GIB ( 02/2022) 2/2 duodenal ulcer. Pt had a Right DCD DDRT performed on 4/21/22 c/b DGF, requiring HD until 4/29/22. Pt readmitted May 2022 for anemia in the setting of a large perinephric hematoma s/p evacuation and repair and arterial anastomosis on 5/02/22. Intraoperative bx negative for rejection with a Cr ~ 2mg/dL.     Pt discharge to rehab where he was diagnosed with Klebsiella UTI s/p Ertepenem for 5 days and switched to Levaquin. Rehab further c/b parainfluenza PNA and an epsiode of status epilecticus on 06/10/22. Pt intubated for airway protection and admitted to MICU at [facility]. Pt extubated 6/11 and found to have a R. pleural effusion s/p Thoracentesis 1.3L drained. Pt started on heparin gtt with decrease in H/H and received 5U PRBC and 2U FFP. Pt transferred to SICU service s/p VATS on 6/13 with 2.2L blood removed and a second chest tube was placed, removed on 6/18. Hospital course further c/b refractory seizures improved with antiepileptic regimen and was transferred to the floor on 7/10/22. Pt went to OR on 7/11 for ureteral stent removed.       Hospital course:  .   - 6/11: Extubated. Found to have R pleural effusion. s/p Thoracentesis 1.3L. Eliquis changed to heparin drip.  Post procedure drop in H&H. 5u PRBC, 2 u FFP.   - 6/11 evening: Transferred to SICU from MICU for further care in setting of hypoxia, significant R pleural effusion, anemia and hemodynamic instability  - 6/11 Intubated at 9pm and sedated on Precedex.  CT placed, 600ml blood drained.  1L total overnight, started pressors  - 6/11 Received Protamine for PTT >100 (was on Heparin drip started for AF), 2 u FFP and 5u PRBC total  - 6/13 s/p VATS 2.2L old blood removed; second chest tube placed  - 6/18-19: chest tubes removed  - 6/21: ?PRES vs. chronic ischemic changes on MRI, off Envarsus, switched to Belatacept 6/23 with good graft function  - 6/25: Tx to SICU for refractory seizures, improved with IV antiepileptic regimen  - 7/10: Passed bedside S&S in SICU. Downgraded from SICU to floor.   - 7/11: OR-->URETERAL STENT REMOVED  - 7/15: ESBL Kleb UTI (50-90K), completed Ertapenem x 3 Days  - 7/16: Cellcept dec to 500 (infection). DCed atovaquone, started bactrim  - 7/18: Belatacept.  mild ANA.  - 7/23: MMF HELD for worsening leukopenia to 1.97. Neupogen x1.  1L fluid restriction for hypoNa. CMV neg.  - 7/24: rising SCr, NS 500cc bolus x 1   - 7/25: readmitted to SICU for leukopenia, rising LFTs, rising creatinine      Allergies: No Known Allergies      MEDICATIONS:   --------------------------------------------------------------------------------------  Neurologic Medications  acetaminophen    Suspension .. 650 milliGRAM(s) Enteral Tube every 6 hours PRN Mild Pain (1 - 3)  fosphenytoin IVPB 200 milliGRAM(s) PE IV Intermittent every 12 hours  melatonin 6 milliGRAM(s) Oral at bedtime    Respiratory Medications    Cardiovascular Medications  amLODIPine   Tablet 10 milliGRAM(s) Oral daily  doxazosin 4 milliGRAM(s) Oral <User Schedule>  hydrALAZINE 25 milliGRAM(s) Oral every 8 hours    Gastrointestinal Medications  pantoprazole  Injectable 40 milliGRAM(s) IV Push daily  polyethylene glycol 3350 17 Gram(s) Oral daily  senna Syrup 10 milliLiter(s) Oral at bedtime  sodium bicarbonate 1300 milliGRAM(s) Oral <User Schedule>  sodium chloride 1 Gram(s) Oral every 8 hours    Genitourinary Medications    Hematologic/Oncologic Medications  enoxaparin Injectable 60 milliGRAM(s) SubCutaneous every 24 hours  epoetin cortney-epbx (RETACRIT) Injectable 62059 Unit(s) SubCutaneous every 7 days  mycophenolate mofetil Suspension 1000 milliGRAM(s) Enteral Tube <User Schedule>  sirolimus Solution 1.5 milliGRAM(s) Oral <User Schedule>    Antimicrobial/Immunologic Medications    Endocrine/Metabolic Medications  finasteride 5 milliGRAM(s) Oral daily  insulin lispro (ADMELOG) corrective regimen sliding scale   SubCutaneous every 6 hours  levothyroxine 50 MICROGram(s) Enteral Tube daily  predniSONE   Tablet 20 milliGRAM(s) Oral once    Topical/Other Medications  chlorhexidine 2% Cloths 1 Application(s) Topical <User Schedule>    --------------------------------------------------------------------------------------    VITAL SIGNS, INS/OUTS (last 24 hours):  --------------------------------------------------------------------------------------  T(C): 37.1 (08-04-22 @ 23:00), Max: 37.3 (08-04-22 @ 19:00)  HR: 49 (08-05-22 @ 03:00) (49 - 58)  BP: 132/60 (08-05-22 @ 03:00) (118/58 - 184/75)  RR: 15 (08-05-22 @ 03:00) (10 - 21)  SpO2: 100% (08-05-22 @ 03:00) (97% - 100%)    08-03-22 @ 07:01  -  08-04-22 @ 07:00  --------------------------------------------------------  IN: 400 mL / OUT: 1500 mL / NET: -1100 mL    08-04-22 @ 07:01  -  08-05-22 @ 03:50  --------------------------------------------------------  IN: 1230 mL / OUT: 2400 mL / NET: -1170 mL      --------------------------------------------------------------------------------------    EXAM        LABS  --------------------------------------------------------------------------------------                        10.7   24.80 )-----------( 456      ( 03 Aug 2022 22:24 )             32.4   08-04    129<L>  |  88<L>  |  84<H>  ----------------------------<  207<H>  5.2   |  24  |  3.97<H>    Ca    8.2<L>      04 Aug 2022 22:25  Phos  5.2     08-04  Mg     2.2     08-04    TPro  5.8<L>  /  Alb  2.1<L>  /  TBili  0.2  /  DBili  0.2  /  AST  42<H>  /  ALT  39  /  AlkPhos  289<H>  08-03  PT/INR - ( 03 Aug 2022 22:24 )   PT: 14.7 sec;   INR: 1.26 ratio         PTT - ( 03 Aug 2022 22:24 )  PTT:32.1 sec  --------------------------------------------------------------------------------------     SICU Daily Progress Note  =====================================================  Interval/Overnight Events:     - NGT placed and tube feeds started   - Hypoglycemic to 50, d/c Lantus and Premeal. Glucose increasing with Tube feeds  - EEG reported 2 Focal motor seizures originating from the left posterior temporal region with frontal evolution with mild focal left centroparietal slowing and generalized background slowing  - HD performed with 2L removal   - Started Lovenox 60 q24 hr, will follow up Xa level   - D/c Fluconazole 2/2 lowering seizure threshold   - started Vancomycin by level given uptrending leukocytosis     HPI:  Mr. Castelan is a 65 y/o M with a PMH of T2DM, ESRD ( HD via R. AV fistula since 2019), HTN, CAD s/p CABG 2020, A. Fib ( On Elqiuis), CVA ( 2019 2/2 A. Fib), Seizure d/o after CVA in 2019, GIB ( 02/2022) 2/2 duodenal ulcer. Pt had a Right DCD DDRT performed on 4/21/22 c/b DGF, requiring HD until 4/29/22. Pt readmitted May 2022 for anemia in the setting of a large perinephric hematoma s/p evacuation and repair and arterial anastomosis on 5/02/22. Intraoperative bx negative for rejection with a Cr ~ 2mg/dL.     Pt discharge to rehab where he was diagnosed with Klebsiella UTI s/p Ertapenem for 5 days and switched to Levaquin. Rehab further c/b parainfluenza PNA and an epsiode of status epilepticus on 06/10/22. Pt intubated for airway protection and admitted to MICU at [facility]. Pt extubated 6/11 and found to have a R. pleural effusion s/p Thoracentesis 1.3L drained. Pt started on heparin gtt with decrease in H/H and received 5U PRBC and 2U FFP. Pt transferred to SICU service s/p VATS on 6/13 with 2.2L blood removed and a second chest tube was placed, removed on 6/18. Hospital course further c/b refractory seizures improved with antiepileptic regimen and was transferred to the floor on 7/10/22. Pt went to OR on 7/11 for ureteral stent removed.     Hospital course further c/b ESBL Klebsiella UTI s/p Ertapenem for a 3 day course. Pt found to have worsening leukopenia to 1.97 s/p Neupogen. Pt readmitted to SICU for leukopenia rising LFTs and, rising creatinine requiring intermittent HD via [facility] Sh[facility] Catheter. Right Renal Bx of the transplant kidney performed by IR on 07/29/22 reporting Grade 1a rejection, started on steroids. Subsequently, pt had 3 Focal Seizures overnight on 8/4/22. EEG reported 2 Focal motor seizures originating from the left posterior temporal region with frontal evolution with mild focal left centroparietal slowing and generalized background slowing. CT Head performed after second focal siezure due to increased weakness of the Right upper extremity, strength 3/5. CT Head unremarkable for acute infarct, mass effect, or hemorrhage. Pt phenytoin changed to Fosphenytoin. Ertapenem d/c due to lowering seizure threshold and started on Vanco by level given rising leukocytosis and increasing erythema around Right groin incision Pt remains in SICU for further assessment and hemodynamic monitoring.       Allergies: No Known Allergies      MEDICATIONS:   --------------------------------------------------------------------------------------  Neurologic Medications  acetaminophen    Suspension .. 650 milliGRAM(s) Enteral Tube every 6 hours PRN Mild Pain (1 - 3)  fosphenytoin IVPB 200 milliGRAM(s) PE IV Intermittent every 12 hours  melatonin 6 milliGRAM(s) Oral at bedtime    Respiratory Medications    Cardiovascular Medications  amLODIPine   Tablet 10 milliGRAM(s) Oral daily  doxazosin 4 milliGRAM(s) Oral <User Schedule>  hydrALAZINE 25 milliGRAM(s) Oral every 8 hours    Gastrointestinal Medications  pantoprazole  Injectable 40 milliGRAM(s) IV Push daily  polyethylene glycol 3350 17 Gram(s) Oral daily  senna Syrup 10 milliLiter(s) Oral at bedtime  sodium bicarbonate 1300 milliGRAM(s) Oral <User Schedule>  sodium chloride 1 Gram(s) Oral every 8 hours    Genitourinary Medications    Hematologic/Oncologic Medications  enoxaparin Injectable 60 milliGRAM(s) SubCutaneous every 24 hours  epoetin cortney-epbx (RETACRIT) Injectable 79650 Unit(s) SubCutaneous every 7 days  mycophenolate mofetil Suspension 1000 milliGRAM(s) Enteral Tube <User Schedule>  sirolimus Solution 1.5 milliGRAM(s) Oral <User Schedule>    Antimicrobial/Immunologic Medications    Endocrine/Metabolic Medications  finasteride 5 milliGRAM(s) Oral daily  insulin lispro (ADMELOG) corrective regimen sliding scale   SubCutaneous every 6 hours  levothyroxine 50 MICROGram(s) Enteral Tube daily  predniSONE   Tablet 20 milliGRAM(s) Oral once    Topical/Other Medications  chlorhexidine 2% Cloths 1 Application(s) Topical <User Schedule>    --------------------------------------------------------------------------------------    VITAL SIGNS, INS/OUTS (last 24 hours):  --------------------------------------------------------------------------------------  T(C): 37.1 (08-04-22 @ 23:00), Max: 37.3 (08-04-22 @ 19:00)  HR: 49 (08-05-22 @ 03:00) (49 - 58)  BP: 132/60 (08-05-22 @ 03:00) (118/58 - 184/75)  RR: 15 (08-05-22 @ 03:00) (10 - 21)  SpO2: 100% (08-05-22 @ 03:00) (97% - 100%)    08-03-22 @ 07:01  -  08-04-22 @ 07:00  --------------------------------------------------------  IN: 400 mL / OUT: 1500 mL / NET: -1100 mL    08-04-22 @ 07:01  -  08-05-22 @ 03:50  --------------------------------------------------------  IN: 1230 mL / OUT: 2400 mL / NET: -1170 mL      --------------------------------------------------------------------------------------    EXAM  GENERAL:   NAD, well-groomed, well-developed  HEAD:    Atraumatic, Normocephalic, EEG in place  EYES:   3mm PERRLA, conjunctiva and sclera clear  ENMT:   No oropharyngeal exudates, erythema or lesions,  Moist mucous membranes  NECK:   Supple, no cervical lymphadenopathy, no JVD  NERVOUS SYSTEM:   Alert & Oriented X1, CN II-XII intact, Moves all extremities  ; Upper extremities  5/5; Lower extremities 5/5, full sensation to light touch   CHEST/LUNG:   utilizing 1L NC. Breath sounds bilaterally   without crackles, rhonchi, wheezes, rubs  HEART:   cardiac monitor Sinus bradycardia ; S1/S2 without murmurs, without rubs, or gallops.  ABDOMEN:   Soft, Nontender, Nondistended; Bowel sounds present, Bladder non distended, non palpable  EXTREMITIES:   Pulses palpable radial pulses 2+ bilat, DP/PT 1+/1+ bilat, without clubbing, cyanosis. Digits warm to touch with good cap refill < 3 secs  SKIN:   warm, dry, intact, normal color, no rash or abnormal lesions, without palpable nodes. Right groin incision with surround erythema, border marked off to monitor for possible expansion. No signs of drainage seen around incision site.        LABS  --------------------------------------------------------------------------------------                        10.7   24.80 )-----------( 456      ( 03 Aug 2022 22:24 )             32.4   08-04    129<L>  |  88<L>  |  84<H>  ----------------------------<  207<H>  5.2   |  24  |  3.97<H>    Ca    8.2<L>      04 Aug 2022 22:25  Phos  5.2     08-04  Mg     2.2     08-04    TPro  5.8<L>  /  Alb  2.1<L>  /  TBili  0.2  /  DBili  0.2  /  AST  42<H>  /  ALT  39  /  AlkPhos  289<H>  08-03  PT/INR - ( 03 Aug 2022 22:24 )   PT: 14.7 sec;   INR: 1.26 ratio         PTT - ( 03 Aug 2022 22:24 )  PTT:32.1 sec  --------------------------------------------------------------------------------------

## 2022-08-05 NOTE — PROVIDER CONTACT NOTE (OTHER) - ACTION/TREATMENT ORDERED:
Transplant team at bedside to assess. Erythema and induration marked. Full set of labs ordered and drawn. WALDO Gamino from transplant team at bedside to assess. Erythema and induration marked. Full set of labs ordered and drawn.

## 2022-08-05 NOTE — PROGRESS NOTE ADULT - SUBJECTIVE AND OBJECTIVE BOX
Transplant Surgery - Multidisciplinary Progress Note  --------------------------------------------------------------  DDRT     4/21/2022             Present:  Patient seen with multidisciplinary team including Transplant Surgeon: Dr. David,  Nephrologist: Dr. Mary Lomeli, Pharmacist: Marika Wayne, Dr. Gimenez ([facility]), SICU team. Disciplines not in attendance will be notified of the plan.     66M with PMH: DM type II, HTN, CAD s/p CABG in 2020, AFib on Eliquis, CVA in 2019 due to Afib, Seizure d/o following CVA, last episode was on 4/8/22, h/o GIB in 2/2022 EGD with duodenal ulcer.  ESRD due to DM was on HD since 2019.     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  Donor was 58 , KDPI 82%, DCD, single vessels and ureter, HLA mismatch 1, 2, 2. No DSA, cPRA 0%. 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 Re-admitted in May for anemia in the setting of large perinephric hematoma s/p evacuation and repair of arterial anastomosis on 5/2/22. Intra operative biopsy showed no rejection, Creatinine ranging ~2mg/dL.    In Rehab:  He was recently dx with klebsiella UTI with Ertapenem - then switched to Levaquin    He also had parainfluenza pneumonia. Was at rehab progressing well.      Hospital course:  - 6/10: transferred from rehab facility for seizure status epilepticus. Intubated for respiratory protection and transferred to MICU.  EEG showed no seizure activity. Neuro consulted.   - 6/11: Extubated. Found to have R pleural effusion. s/p Thoracentesis 1.3L. Eliquis changed to heparin drip.  Post procedure drop in H&H. 5u PRBC, 2 u FFP.   - 6/11 evening: Transferred to SICU from MICU for further care in setting of hypoxia, significant R pleural effusion, anemia and hemodynamic instability  - 6/11 Intubated at 9pm and sedated on Precedex.  CT placed, 600ml blood drained.  1L total overnight, started pressors  - 6/11 Received Protamine for PTT >100 (was on Heparin drip started for AF), 2 u FFP and 5u PRBC total  - 6/13 s/p VATS 2.2L old blood removed; second chest tube placed  - 6/18-19: chest tubes removed  - 6/21: ?PRES vs. chronic ischemic changes on MRI, off Envarsus, switched to Belatacept 6/23 with good graft function  - 6/25: Tx to SICU for refractory seizures, improved with IV antiepileptic regimen  - 7/10: Downgraded from SICU to floor.   - 7/11: OR-->URETERAL STENT REMOVED  - 7/15: ESBL Kleb UTI (50-90K), completed Ertapenem x 3 Days  - 7/25: Tx to SICU for Sepsis/ elevated LFTS  - 7/27: Shiley placed for HD  - 7/28: ongoing fevers -> started Ertapenem/Fluc  - 7/29: HD restarted + 1U PRBC.  IR bx with 2A rejection, started pulse steroids. LFTs have improved  - 8/4: refractory seizures    Interval events:   - o/n with acute refractory seizures.  placed on EEG with 2 seizures identified by Epilepsy Team.  Placed on FosPhenytoin IV  - AAOx0-1 with periods of lethargy  - anasarca, tolerated HD yesterday. remains anuric  - periods of hypertension, but otherwise with stable vitals  - worsening LLQ incisional erythema around surgical incision  - CT abdomen and renal ultrasound obtained 8/5    Immunosuppression  Induction:  Thymoglobulin                                        Maintenance:  - Belatacept: 6/23, 7/4, 7/8, 7/18. 8/1, now discontinued  - Now on Sirolimus as of 8/2  - MMF 1g BID  - Pred taper   - Ongoing monitoring for signs of rejection.    Potential Discharge date: pending clinical improvement.     Education:  Medications    Plan of care:  See Below           MEDICATIONS  (STANDING):  amLODIPine   Tablet 10 milliGRAM(s) Oral daily  chlorhexidine 2% Cloths 1 Application(s) Topical <User Schedule>  doxazosin 4 milliGRAM(s) Oral <User Schedule>  epoetin cortney-epbx (RETACRIT) Injectable 07289 Unit(s) SubCutaneous every 7 days  finasteride 5 milliGRAM(s) Oral daily  fosphenytoin IVPB 200 milliGRAM(s) PE IV Intermittent every 12 hours  hydrALAZINE 25 milliGRAM(s) Oral every 8 hours  insulin lispro (ADMELOG) corrective regimen sliding scale   SubCutaneous every 6 hours  levothyroxine 50 MICROGram(s) Enteral Tube daily  melatonin 6 milliGRAM(s) Oral at bedtime  mycophenolate mofetil Suspension 1000 milliGRAM(s) Enteral Tube <User Schedule>  pantoprazole  Injectable 40 milliGRAM(s) IV Push daily  polyethylene glycol 3350 17 Gram(s) Oral daily  predniSONE   Tablet 20 milliGRAM(s) Oral daily  senna Syrup 10 milliLiter(s) Oral at bedtime  sirolimus Solution 1.5 milliGRAM(s) Oral <User Schedule>  trimethoprim  40 mG/sulfamethoxazole 200 mG Suspension 80 milliGRAM(s) Oral daily  valGANciclovir 50 mG/mL Oral Solution 450 milliGRAM(s) Oral <User Schedule>    MEDICATIONS  (PRN):  acetaminophen    Suspension .. 650 milliGRAM(s) Enteral Tube every 6 hours PRN Mild Pain (1 - 3)      PAST MEDICAL & SURGICAL HISTORY:  Hypertension      Diabetes      Dyslipidemia      CAD (Coronary Artery Disease)  with Stents in 06/2009 and 6/2019, s/p off-pump C3L on 8/13/20      Hypothyroidism      CVA (cerebral vascular accident)  12/13/19 with residual bilateral weakness      Anemia      PEG (percutaneous endoscopic gastrostomy) status  removed July 2020      Intubation of airway performed without difficulty  Dec 2019  CVA c/b status epilepticus requiring intubation and PEG in 12/2019-      ESRD on dialysis  M/W/F      Seizures  after CVA, last seizure was last week 4/07/22      History of insertion of stent into coronary artery bypass graft  2009 and 2019      S/P CABG x 3  off pump C3L on 8/13/20          Vital Signs Last 24 Hrs  T(C): 36.4 (05 Aug 2022 11:00), Max: 37.3 (04 Aug 2022 19:00)  T(F): 97.5 (05 Aug 2022 11:00), Max: 99.2 (04 Aug 2022 19:00)  HR: 50 (05 Aug 2022 11:00) (47 - 56)  BP: 106/51 (05 Aug 2022 11:00) (106/51 - 167/70)  BP(mean): 74 (05 Aug 2022 11:00) (74 - 101)  RR: 19 (05 Aug 2022 11:00) (10 - 23)  SpO2: 96% (05 Aug 2022 11:00) (96% - 100%)    Parameters below as of 05 Aug 2022 11:00  Patient On (Oxygen Delivery Method): room air        I&O's Summary    04 Aug 2022 07:01  -  05 Aug 2022 07:00  --------------------------------------------------------  IN: 1360 mL / OUT: 2400 mL / NET: -1040 mL    05 Aug 2022 07:01  -  05 Aug 2022 11:53  --------------------------------------------------------  IN: 410 mL / OUT: 0 mL / NET: 410 mL                              9.7    30.60 )-----------( 405      ( 05 Aug 2022 04:18 )             29.9     08-05    130<L>  |  90<L>  |  86<H>  ----------------------------<  199<H>  5.3   |  24  |  4.17<H>    Ca    8.5      05 Aug 2022 04:17  Phos  5.9     08-05  Mg     2.3     08-05    TPro  5.8<L>  /  Alb  2.2<L>  /  TBili  0.3  /  DBili  0.2  /  AST  29  /  ALT  28  /  AlkPhos  249<H>  08-05          Culture - Blood (collected 08-03-22 @ 23:46)  Source: .Blood Blood  Preliminary Report (08-05-22 @ 03:01):    No growth to date.    Culture - Blood (collected 08-03-22 @ 23:46)  Source: .Blood Blood  Preliminary Report (08-05-22 @ 03:01):    No growth to date.    Culture - Blood (collected 07-30-22 @ 06:19)  Source: .Blood Blood-Peripheral  Final Report (08-04-22 @ 11:01):    No Growth Final    Culture - Blood (collected 07-30-22 @ 06:07)  Source: .Blood Blood-Peripheral  Final Report (08-04-22 @ 11:01):    No Growth Final                         REVIEW OF SYSTEMS  --------------------------------------------------------------------------------  unable to assess, see above        PHYSICAL EXAM:  Constitutional: awake, weak, not coherent  Eyes: Anicteric  ENMT: nc/at  Respiratory: not tachypneic, non-labored on RA    Cardiovascular: normotensive, regular rate on monitor   Gastrointestinal: Soft, non distended, nontender, RLQ incisional scar healed, some erythema.  anasarca throughout  Genitourinary: anuric on HD   Extremities: SCD's in place and working bilaterally, overall with worsening edema  Vascular: Palpable dp pulses bilaterally  Neurological: AAOx0  Skin: no rashes, ulcerations or lesions  Musculoskeletal: Moving all extremities  Psychiatric: overall calm, but with  periods of agitation     Transplant Surgery - Multidisciplinary Progress Note  --------------------------------------------------------------  DDRT     4/21/2022             Present:  Patient seen with multidisciplinary team including Transplant Surgeon: Dr. David,  Nephrologist: Dr. Mary Lomeli, Pharmacist: Marika Styles, Dr. Majano, SICU team. Disciplines not in attendance will be notified of the plan.     66M with PMH: DM type II, HTN, CAD s/p CABG in 2020, AFib on Eliquis, CVA in 2019 due to Afib, Seizure d/o following CVA, last episode was on 4/8/22, h/o GIB in 2/2022 EGD with duodenal ulcer.  ESRD due to DM was on HD since 2019.     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  Donor was 58 , KDPI 82%, DCD, single vessels and ureter, HLA mismatch 1, 2, 2. No DSA, cPRA 0%. 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 Re-admitted in May for anemia in the setting of large perinephric hematoma s/p evacuation and repair of arterial anastomosis on 5/2/22. Intra operative biopsy showed no rejection, Creatinine ranging ~2mg/dL.    In Rehab:  He was recently dx with klebsiella UTI with Ertapenem - then switched to Levaquin    He also had parainfluenza pneumonia. Was at rehab progressing well.      Hospital course:  - 6/10: transferred from rehab facility for seizure status epilepticus. Intubated for respiratory protection and transferred to MICU.  EEG showed no seizure activity. Neuro consulted.   - 6/11: Extubated. Found to have R pleural effusion. s/p Thoracentesis 1.3L. Eliquis changed to heparin drip.  Post procedure drop in H&H. 5u PRBC, 2 u FFP.   - 6/11 evening: Transferred to SICU from MICU for further care in setting of hypoxia, significant R pleural effusion, anemia and hemodynamic instability  - 6/11 Intubated at 9pm and sedated on Precedex.  CT placed, 600ml blood drained.  1L total overnight, started pressors  - 6/11 Received Protamine for PTT >100 (was on Heparin drip started for AF), 2 u FFP and 5u PRBC total  - 6/13 s/p VATS 2.2L old blood removed; second chest tube placed  - 6/18-19: chest tubes removed  - 6/21: ?PRES vs. chronic ischemic changes on MRI, off Envarsus, switched to Belatacept 6/23 with good graft function  - 6/25: Tx to SICU for refractory seizures, improved with IV antiepileptic regimen  - 7/10: Downgraded from SICU to floor.   - 7/11: OR-->URETERAL STENT REMOVED  - 7/15: ESBL Kleb UTI (50-90K), completed Ertapenem x 3 Days  - 7/25: Tx to SICU for Sepsis/ elevated LFTS  - 7/27: Shiley placed for HD  - 7/28: ongoing fevers -> started Ertapenem/Fluc  - 7/29: HD restarted + 1U PRBC.  IR bx with 2A rejection, started pulse steroids. LFTs have improved  - 8/4: refractory seizures    Interval events:   - o/n with acute refractory seizures.  placed on EEG with 2 seizures identified by Epilepsy Team.  FosPhenytoin IV increased to 200mg  - AAOx0-1 with periods of lethargy  - anasarca, tolerated HD yesterday. remains anuric  - periods of hypertension, but otherwise with stable vitals  - worsening LLQ incisional erythema around surgical incision  - CT abdomen and renal ultrasound obtained 8/5    Immunosuppression  Induction:  Thymoglobulin                                        Maintenance:  - Belatacept: 6/23, 7/4, 7/8, 7/18. 8/1, now discontinued  - Now on Sirolimus as of 8/2  - MMF 1g BID  - Pred taper   - Ongoing monitoring for signs of rejection.    Potential Discharge date: pending clinical improvement.     Education:  Medications    Plan of care:  See Below           MEDICATIONS  (STANDING):  amLODIPine   Tablet 10 milliGRAM(s) Oral daily  chlorhexidine 2% Cloths 1 Application(s) Topical <User Schedule>  doxazosin 4 milliGRAM(s) Oral <User Schedule>  epoetin cortney-epbx (RETACRIT) Injectable 52050 Unit(s) SubCutaneous every 7 days  finasteride 5 milliGRAM(s) Oral daily  fosphenytoin IVPB 200 milliGRAM(s) PE IV Intermittent every 12 hours  hydrALAZINE 25 milliGRAM(s) Oral every 8 hours  insulin lispro (ADMELOG) corrective regimen sliding scale   SubCutaneous every 6 hours  levothyroxine 50 MICROGram(s) Enteral Tube daily  melatonin 6 milliGRAM(s) Oral at bedtime  mycophenolate mofetil Suspension 1000 milliGRAM(s) Enteral Tube <User Schedule>  pantoprazole  Injectable 40 milliGRAM(s) IV Push daily  polyethylene glycol 3350 17 Gram(s) Oral daily  predniSONE   Tablet 20 milliGRAM(s) Oral daily  senna Syrup 10 milliLiter(s) Oral at bedtime  sirolimus Solution 1.5 milliGRAM(s) Oral <User Schedule>  trimethoprim  40 mG/sulfamethoxazole 200 mG Suspension 80 milliGRAM(s) Oral daily  valGANciclovir 50 mG/mL Oral Solution 450 milliGRAM(s) Oral <User Schedule>    MEDICATIONS  (PRN):  acetaminophen    Suspension .. 650 milliGRAM(s) Enteral Tube every 6 hours PRN Mild Pain (1 - 3)      PAST MEDICAL & SURGICAL HISTORY:  Hypertension      Diabetes      Dyslipidemia      CAD (Coronary Artery Disease)  with Stents in 06/2009 and 6/2019, s/p off-pump C3L on 8/13/20      Hypothyroidism      CVA (cerebral vascular accident)  12/13/19 with residual bilateral weakness      Anemia      PEG (percutaneous endoscopic gastrostomy) status  removed July 2020      Intubation of airway performed without difficulty  Dec 2019  CVA c/b status epilepticus requiring intubation and PEG in 12/2019-      ESRD on dialysis  M/W/F      Seizures  after CVA, last seizure was last week 4/07/22      History of insertion of stent into coronary artery bypass graft  2009 and 2019      S/P CABG x 3  off pump C3L on 8/13/20          Vital Signs Last 24 Hrs  T(C): 36.4 (05 Aug 2022 11:00), Max: 37.3 (04 Aug 2022 19:00)  T(F): 97.5 (05 Aug 2022 11:00), Max: 99.2 (04 Aug 2022 19:00)  HR: 50 (05 Aug 2022 11:00) (47 - 56)  BP: 106/51 (05 Aug 2022 11:00) (106/51 - 167/70)  BP(mean): 74 (05 Aug 2022 11:00) (74 - 101)  RR: 19 (05 Aug 2022 11:00) (10 - 23)  SpO2: 96% (05 Aug 2022 11:00) (96% - 100%)    Parameters below as of 05 Aug 2022 11:00  Patient On (Oxygen Delivery Method): room air        I&O's Summary    04 Aug 2022 07:01  -  05 Aug 2022 07:00  --------------------------------------------------------  IN: 1360 mL / OUT: 2400 mL / NET: -1040 mL    05 Aug 2022 07:01  -  05 Aug 2022 11:53  --------------------------------------------------------  IN: 410 mL / OUT: 0 mL / NET: 410 mL                              9.7    30.60 )-----------( 405      ( 05 Aug 2022 04:18 )             29.9     08-05    130<L>  |  90<L>  |  86<H>  ----------------------------<  199<H>  5.3   |  24  |  4.17<H>    Ca    8.5      05 Aug 2022 04:17  Phos  5.9     08-05  Mg     2.3     08-05    TPro  5.8<L>  /  Alb  2.2<L>  /  TBili  0.3  /  DBili  0.2  /  AST  29  /  ALT  28  /  AlkPhos  249<H>  08-05          Culture - Blood (collected 08-03-22 @ 23:46)  Source: .Blood Blood  Preliminary Report (08-05-22 @ 03:01):    No growth to date.    Culture - Blood (collected 08-03-22 @ 23:46)  Source: .Blood Blood  Preliminary Report (08-05-22 @ 03:01):    No growth to date.    Culture - Blood (collected 07-30-22 @ 06:19)  Source: .Blood Blood-Peripheral  Final Report (08-04-22 @ 11:01):    No Growth Final    Culture - Blood (collected 07-30-22 @ 06:07)  Source: .Blood Blood-Peripheral  Final Report (08-04-22 @ 11:01):    No Growth Final                         REVIEW OF SYSTEMS  --------------------------------------------------------------------------------  unable to assess, see above        PHYSICAL EXAM:  Constitutional: awake, weak, not coherent  Eyes: Anicteric  ENMT: nc/at  Respiratory: not tachypneic, non-labored on RA    Cardiovascular: normotensive, regular rate on monitor   Gastrointestinal: Soft, non distended, nontender, RLQ incisional scar healed, some erythema.  anasarca throughout  Genitourinary: anuric on HD   Extremities: SCD's in place and working bilaterally, overall with worsening edema  Vascular: Palpable dp pulses bilaterally  Neurological: AAOx0  Skin: no rashes, ulcerations or lesions  Musculoskeletal: Moving all extremities  Psychiatric: overall calm, but with  periods of agitation

## 2022-08-05 NOTE — PROGRESS NOTE ADULT - ASSESSMENT
66M with h/o DM type II, HTN, CAD s/p CABG in 2020, Afib on Eliquis, CVA in 2019 due to Afib, Seizure d/o (last episode was on 4/8/22), h/o GI bleed in 2/2022 EGD with duodenal ulcer and ESRD on HD s/p R DDRT from DCD donor on 4/21/22 complicated by DGF requiring HD until 4/29/22 who presented with status epilepticus in setting of UTI/antibiotics and sub-therapeutic valproic acid level. Transferred to SICU on 7/25 with signs of sepsis, now with refractory seizures    Seizure Disorder:  - refractory seizures o/n.  Neuro/Epilepsy Teams following.  Follow recommendations closely. continue EEG. Avoid Fycompa 2/2 agitation in past  - prior MRI head 6/27 with less concerns for PRES, likely ischemic changes  - o/n CTH 8/3 with no acute findings  - replete lytes aggressively    R DCD DDRT 4/21/22 now with 2A ACR  - 2A ACR: s/p pulse steroids, now on taper  - will continue HD MWF, with and additional session today given persistent fluid overload and anuria  - graft doppler stable, stable perinephric collection   - S/P removal of ureteral stent on 7/12  - Continue Doxazosin/Proscar for BPH  - OOB with RN/PT  - DSA negative   - ADAT  - follow up CT C/A/P and renal ultrasound today    Immunosuppression:   - Belatacept: 6/23, 7/4, 7/8. Re-loaded 7/8,  as there was a gap between doses 2/2 seizures. 7/18,  last dose 8/1  - on Sirolimus as of 8/2  - continue Pred 5  - PPx: Valcyte/ bactrim on hold, will restart as able    DM  - steroid induced hyperglycemia, improving with Endocrine management    AFib/R IJ DVT   - Eliquis with ~40% less efficacy while on fosphenytoin.    - Lovenox held for ANA and transaminitis   - rate controlled  - will discuss A/C plans daily    HTN:  - Nifedipine/Cardura/Hydralazline  - off Coreg due to bradycardia    DISPO  - continue SICU care

## 2022-08-05 NOTE — PROGRESS NOTE ADULT - ATTENDING COMMENTS
Interval History as per resident note, personally verified by me. Patient with 3 episodes of focal seizures yesterday, now improved. Happened after HD with blood pressure spike and also noted with rising leukocytosis. No further seizures now and he is connected to vEEG at bedside. No additional focal neurologic deficits noted.    Focused neurologic exam:  MS Ricarda EDWARDSO x 1 (self only), speech bradyphrenic  and mild to moderate dysarthria but otherwise fluent, rarely follows simple commands. Rep/naming, attn/conc, recent and remote memory, fund of knowledge dec  CN - PERRL, EOMI, VFF, face sens/str intact b/l except for R NLFF, hearing intact to conversation b/l, SCM at least 4+/5 b/l and symmetric, tongue/palate midline  Motor - Normal bulk. Inc tone in L side (paratonic?) and normal tone in R side. RUE 4/5, LUE 4+/5, RLE 3/5, LLE 3+/5 (limited by edema)  Sens - LT/temp intact all, as above  DTR's - 2+ BUE's, 3+ R KJ, 2+ L KJ, 1+ AJ b/l, and downgoing b/l plantar response  Coord - Grossly appropriate for level of strength  Gait and station - Due to fall risk/safety concerns did not assess    Phenytoin level - 5.5 (corrected 10.2)    A/P:  Epilepsy, intractable, with resolved status epilepticus  Encephalopathy  S/p renal transplant, ANA  Prior strokes  Atrial fibrillation  CAD  HTN  DM type 2  Transaminitis (AST/alk phos inc 42/289)  Hyponatremia (Na 130)  Leukocytosis  UTI    - Patient with new convulsive seizures consistent with his prior ones. Likely provoked in the setting of recent HD, high blood pressure, and systemic infection. Although CNS infection should be on the differential, especially given immunocompromised state with renal transplant, would favor more systemic process as opposed to primary CNS  - vEEG to evaluate for focal slowing, epileptiform discharges, and seizures. No seizures in > 24 hours, PLEASE STOP  - Fosphenytoin 200mg IV BID, new level stable. No need to check additional levels unless patient has more clinical seizures  - Due to prior behavioral changes Fycompa. If additional AED needed may then consider Briviact with HD dosing  - Discussed restarting Eliquis for secondary stroke prevention with family and kidney transplant service. As this interacts with phenytoin would not be as ideal as it would be at lower efficacy. Also not optimal to change AED's given patient has experienced a number of side effects and he has a lower threshold for further seizures. Best options would be either Lovenox or warfarin (adjust to goal INR 2-3) long term. Family and other medical teams are in agreement  - Recent MRI brain w/ and w/o without clear evidence of infection, inflammation, or acute CNS event (possibly minimal enhancement). 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 May consider repeat MRI brain if seizures recur given high blood pressure but less likely  - Continue to address above medical problems, as you are doing  - Will continue to follow patient with you, as needed

## 2022-08-05 NOTE — PROGRESS NOTE ADULT - ASSESSMENT
66 yr old Male with PMHx ESRD s/p permacath removal 5/10/22 s/p Rt DDRT 4/21/22,  CVA (2019), Afib on apixaban, seizure activity, CAD s/p stents, CABG (2020), HTN, HLD, DM on insulin, gastric/duodenal ulcer, recent hospitalization 4/28/22 -5/10/22 with weakness/anemia found to have perinephric hematoma requiring evacuation and repair of bleeding arterial anastomosis. Curently being treated for UTI with Levaquin Today after developing multiple sz episodes refractory to 5 mg versed IM (EMS) and 2 mg ativan IV in E.D. concerning for status epilepticus requiring intubation for hypoxic respiratory  failure. MICU Consult was called for hypoxic respiratory failure secondary to status epilepticus.  Nephrology consulted for renal failure.     A/P  DDRT on 04/21/22  Course complicated by DGF, was on HD until 4/29/22. Readmitted in May for anemia in the setting of large perinephric hematoma s/p evacuation and repair of arterial anastomosis on 5/02/22. Intra operative biopsy showed no rejection.  ANA was initially sec to  hemodynamic change   stent removal 07/12/22  No hydronephrosis noted on renal US. UA done on 7/25/22 showed LE and pyuria.   allograft US 07/28/22 shows Interval increase in the resistive indices and peak systolic velocities   compared to prior study performed yesterday. Short-term imaging follow-up   if clinically indicated.  Grade 1a rejection (biopsy on 7/29) - s/p pulse dose steroids  no DSA.   continue Immunosuppression per transplant team  last HD 08/03/22  ; plan for HD today-  HD per transplant team   transplant team on board   monitor BMP, U/O     HTN   optimal   manage by transplant team    monitor BP closely

## 2022-08-05 NOTE — PROGRESS NOTE ADULT - NSPROGADDITIONALINFOA_GEN_ALL_CORE
-Plan discussed with pt/team.  Contact info: [phone] (24/7). pager [phone]  [email or web address] password [email or web address]  Spent 30 minutes assessing pt/labs/meds and discussing plan of care with primary team/ son  Adjusting insulin  Discharge plan  Follow up care

## 2022-08-05 NOTE — PROVIDER CONTACT NOTE (OTHER) - ASSESSMENT
All VSS. RLQ rigid, tender to palpation, with increased erythema and warmth to renal transplant site.

## 2022-08-05 NOTE — PROGRESS NOTE ADULT - NUTRITIONAL ASSESSMENT
Diet, NPO with Tube Feed:   Tube Feeding Modality: Nasogastric  Nepro with Carb Steady (NEPRORTH)  Total Volume for 24 Hours (mL): 480  Continuous  Starting Tube Feed Rate {mL per Hour}: 20  Until Goal Tube Feed Rate (mL per Hour): 20  Tube Feed Duration (in Hours): 24  Tube Feed Start Time: 11:00 (08-04-22 @ 10:54) [Active]      Please see RD assessment and/or follow up.  Managed by primary team as well

## 2022-08-05 NOTE — PROGRESS NOTE ADULT - PROBLEM SELECTOR PLAN 3
On LT4 50 mcg daily  Since pt on TFs now consider switching to IV dosing in order to ensure med absorption. Synthroid 37.5mcg IV   Can also hold TF 1 hour before and 1 hour after med is given vit NGT. Please make sure LT4 is given on an empty stomach at least one hour apart from other meds and food to ensure med absorption. DO NOT GIVE WITH VITAMINS/ANTACIDS  - monitor TFTs outpatient in 4 weeks after dc with Dr. Lincoln.

## 2022-08-05 NOTE — PROGRESS NOTE ADULT - SUBJECTIVE AND OBJECTIVE BOX
Neurology Progress Note    SUBJECTIVE/OBJECTIVE/INTERVAL EVENTS: Patient seen and examined at bedside w/ neuro attending and team. More awake and alert but not oriented to questioning. Not able to obtain ROS. Prelim EEG w/o seizures since 8/4.    VITALS & EXAMINATION:  Vital Signs Last 24 Hrs  T(C): 36.4 (05 Aug 2022 11:00), Max: 37.3 (04 Aug 2022 19:00)  T(F): 97.5 (05 Aug 2022 11:00), Max: 99.2 (04 Aug 2022 19:00)  HR: 50 (05 Aug 2022 12:00) (47 - 56)  BP: 139/61 (05 Aug 2022 12:00) (106/51 - 167/70)  BP(mean): 88 (05 Aug 2022 12:00) (74 - 101)  RR: 14 (05 Aug 2022 12:00) (10 - 23)  SpO2: 96% (05 Aug 2022 12:00) (96% - 100%)    Parameters below as of 05 Aug 2022 11:00  Patient On (Oxygen Delivery Method): room air    General appearance: does not appear to be in pain  Head: normocephalic  Eyes: clear sclera  Respiratory: breathing regularly    Focused neurologic exam:  MS - awake, alert, not oriented to person, place date. Follows simple commands. Attend to stimuli from both sides.   CN - pupils equal round, EOMI, BTT b/l, mild R facial droop, cannot assess CNV, hearing intact to conversation b/l, shoulder shrug limited evaluation   Motor - Normal bulk. Inc tone in L side and normal tone in R side. Spontaneous movements of L > R side and at least antigravity. Proximal str LUE 4+, RUE 4, LLE 4-, RLE 3    Sens - LT/temp intact all, as above  Toes downgoing bilaterally   Gait and station - PERRY    LABORATORY:  CBC                       9.7    30.60 )-----------( 405      ( 05 Aug 2022 04:18 )             29.9     Chem 08-05    130<L>  |  90<L>  |  86<H>  ----------------------------<  199<H>  5.3   |  24  |  4.17<H>    Ca    8.5      05 Aug 2022 04:17  Phos  5.9     08-05  Mg     2.3     08-05    TPro  5.8<L>  /  Alb  2.2<L>  /  TBili  0.3  /  DBili  0.2  /  AST  29  /  ALT  28  /  AlkPhos  249<H>  08-05    LFTs LIVER FUNCTIONS - ( 05 Aug 2022 04:17 )  Alb: 2.2 g/dL / Pro: 5.8 g/dL / ALK PHOS: 249 U/L / ALT: 28 U/L / AST: 29 U/L / GGT: x           Coagulopathy PT/INR - ( 05 Aug 2022 04:18 )   PT: 15.8 sec;   INR: 1.37 ratio         PTT - ( 05 Aug 2022 04:18 )  PTT:37.9 sec  Lipid Panel 07-27 Chol -- LDL -- HDL -- Trig 118  A1c   Cardiac enzymes     U/A   CSF  Immunological  Other    STUDIES & IMAGING: (EEG, CT, MR, U/S, TTE/DINAH):    Clinical Impression:  Left hemispheric dysfunction, temporal maximal   Mild diffuse cerebral dysfunction, not specific as to etiology.  No seizures since 8/4/22 5AM     This is a preliminary report Neurology Progress Note    SUBJECTIVE/OBJECTIVE/INTERVAL EVENTS: Patient seen and examined at bedside w/ neuro attending and team. More awake and alert but not oriented to questioning. Not able to obtain ROS. Prelim EEG w/o seizures since 8/4.    FHx: Non-contributory    ROS: Due to clinical condition unable to assess (PERRY)    VITALS & EXAMINATION:  Vital Signs Last 24 Hrs  T(C): 36.4 (05 Aug 2022 11:00), Max: 37.3 (04 Aug 2022 19:00)  T(F): 97.5 (05 Aug 2022 11:00), Max: 99.2 (04 Aug 2022 19:00)  HR: 50 (05 Aug 2022 12:00) (47 - 56)  BP: 139/61 (05 Aug 2022 12:00) (106/51 - 167/70)  BP(mean): 88 (05 Aug 2022 12:00) (74 - 101)  RR: 14 (05 Aug 2022 12:00) (10 - 23)  SpO2: 96% (05 Aug 2022 12:00) (96% - 100%)    Parameters below as of 05 Aug 2022 11:00  Patient On (Oxygen Delivery Method): room air    General appearance: does not appear to be in pain  Head: normocephalic  Eyes: clear sclera  Respiratory: breathing regularly    Focused neurologic exam:  MS - awake, alert, not oriented to person, place date. Follows simple commands. Attend to stimuli from both sides.   CN - pupils equal round, EOMI, BTT b/l, mild R facial droop, cannot assess CNV, hearing intact to conversation b/l, shoulder shrug limited evaluation   Motor - Normal bulk. Inc tone in L side and normal tone in R side. Spontaneous movements of L > R side and at least antigravity. Proximal str LUE 4+, RUE 4, LLE 4-, RLE 3    Sens - LT/temp intact all, as above  Toes downgoing bilaterally   Gait and station - PERRY    LABORATORY:  CBC                       9.7    30.60 )-----------( 405      ( 05 Aug 2022 04:18 )             29.9     Chem 08-05    130<L>  |  90<L>  |  86<H>  ----------------------------<  199<H>  5.3   |  24  |  4.17<H>    Ca    8.5      05 Aug 2022 04:17  Phos  5.9     08-05  Mg     2.3     08-05    TPro  5.8<L>  /  Alb  2.2<L>  /  TBili  0.3  /  DBili  0.2  /  AST  29  /  ALT  28  /  AlkPhos  249<H>  08-05    LFTs LIVER FUNCTIONS - ( 05 Aug 2022 04:17 )  Alb: 2.2 g/dL / Pro: 5.8 g/dL / ALK PHOS: 249 U/L / ALT: 28 U/L / AST: 29 U/L / GGT: x           Coagulopathy PT/INR - ( 05 Aug 2022 04:18 )   PT: 15.8 sec;   INR: 1.37 ratio         PTT - ( 05 Aug 2022 04:18 )  PTT:37.9 sec  Lipid Panel 07-27 Chol -- LDL -- HDL -- Trig 118  A1c   Cardiac enzymes     U/A   CSF  Immunological  Other    STUDIES & IMAGING: (EEG, CT, MR, U/S, TTE/DINAH):    Clinical Impression:  Left hemispheric dysfunction, temporal maximal   Mild diffuse cerebral dysfunction, not specific as to etiology.  No seizures since 8/4/22 5AM     This is a preliminary report

## 2022-08-05 NOTE — PROGRESS NOTE ADULT - ASSESSMENT
65yo man with PMH of CVA (2019) with subsequent seizure disorder, ESRD s/p renal transplant (04/2022), afib (on apixaban), CAD (s/p stents), CABG (2020), HTN, HLD, DM presents to the ED with status epilepticus from [facility]. Semiology includes eyes deviated to the R, rhythmic R facial twitching as well as R shoulder clonic movements lasting approximately 30 seconds each. EEG from 04/2022 showed L frontocentral focal onset seizures. S/p intubation on 6/11. EEG negative for seizure but showed structural or functional abnormality in the left fronto-temporal region and moderate to severe nonspecific diffuse or multifocal cerebral dysfunction. RRT called 6/21 for decreased movement of RUE but with increased tone and tremor like activity/ clonus and diffuse increased tone and increased encephalopathy concerning for seizure. Course then complicated by EEG 6/24 showing L frontotemporal/ frontal seizures. Was on keppra, valproic acid, vimpat but had seizures through the prior two medications. Stopped vimpat, valproic acid, standing ativan, and fycompa. Stopped fycompa as patient became agitated, aggressive. Was only on phenytoin until recalled on evening of 8/3 into AM of 8/4 for concern of seizure. Now improved.    EEG 8/4:   2 Focal motor seizures originating from the left posterior temporal region with frontal evolution  Mild focal left centroparietal slowing   Mild generalized background slowing    EEG 8/5: Left hemispheric dysfunction, temporal maximal,   No seizures since 8/4/22 5AM      Impression: History of recent status epilepticus that was refractory, was stable on phenytoin. Post HD and hypertensive with concerns of systemic inflammatory process (elevated leukocytosis patient found with recurrent seizures. Mental status improved possibly from hospital-induced delirium with toxic-metabolic encephalopathy.      Recommendations:  [] Continue recently increased fosphenytoin 200mg q12h  [] can stop EEG for now  [] monitor clinically for seizures and if patient has a seizure will consider addition of another AED (ie briviact)   [] holding off on repeat MRI brain w/ w/o con for now  [x] CSF without evidence of infection, inflammation  [x] Vitamin B12 1157, folate >20, homocysteine 13.4, Vitamin B1 160  [x] TSH 4.6, T3 3.9/T4 43 - correction of hypothyroidism per primary team and endocrinology  [x] BP goals of normotension   [x] MR brain without clear evidence of infection, inflammation, or acute CNS event (possibly minimal enhancement). Although prior study read as possible PRES, most recent MRI seems more consistent with significant chronic ischemic microvascular disease given no associated DWI changes or obvious enhancement  [x] Telemetry monitoring  [x] Neuro checks and vital signs per unit protocol  [x] Seizure/fall/aspiration precautions  [ ] please have adequate sleeping environment for the patient, light on, curtains open, TV on during the day with regular stimulation and out of bed to chair if possible. During the night, close curtains, TV off, lights off, and minimize interruptions (limit blood draws and vital sign checks if possible). Regular interaction with patient with staff (introducing selves), frequent reorientation, and encouragement of visitors as allowed by hospital and unit policy. Regular toileting.  [/] Advise against driving, operating heavy machinery, water sports/bathing, activity in elevation without appropriate safety precautions  [ ] outpatient EMG/ NCS    Patient seen and discussed with neurology attending, Dr. Maria.

## 2022-08-05 NOTE — PROGRESS NOTE ADULT - SUBJECTIVE AND OBJECTIVE BOX
Follow Up:      Interval History:    REVIEW OF SYSTEMS  [  ] ROS unobtainable because:    [  ] All other systems negative except as noted below    Constitutional:  [ ] fever [ ] chills  [ ] weight loss  [ ] weakness  Skin:  [ ] rash [ ] phlebitis	  Eyes: [ ] icterus [ ] pain  [ ] discharge	  ENMT: [ ] sore throat  [ ] thrush [ ] ulcers [ ] exudates  Respiratory: [ ] dyspnea [ ] hemoptysis [ ] cough [ ] sputum	  Cardiovascular:  [ ] chest pain [ ] palpitations [ ] edema	  Gastrointestinal:  [ ] nausea [ ] vomiting [ ] diarrhea [ ] constipation [ ] pain	  Genitourinary:  [ ] dysuria [ ] frequency [ ] hematuria [ ] discharge [ ] flank pain  [ ] incontinence  Musculoskeletal:  [ ] myalgias [ ] arthralgias [ ] arthritis  [ ] back pain  Neurological:  [ ] headache [ ] seizures  [ ] confusion/altered mental status    Allergies  No Known Allergies        ANTIMICROBIALS:  trimethoprim  40 mG/sulfamethoxazole 200 mG Suspension 80 daily  valGANciclovir 50 mG/mL Oral Solution 450 <User Schedule>      OTHER MEDS:  MEDICATIONS  (STANDING):  acetaminophen    Suspension .. 650 every 6 hours PRN  amLODIPine   Tablet 10 daily  doxazosin 4 <User Schedule>  epoetin cortney-epbx (RETACRIT) Injectable 03971 every 7 days  finasteride 5 daily  fosphenytoin IVPB 200 every 12 hours  hydrALAZINE 25 every 8 hours  insulin lispro (ADMELOG) corrective regimen sliding scale  every 6 hours  levothyroxine Injectable 37.5 at bedtime  melatonin 6 at bedtime  mycophenolate mofetil Suspension 1000 <User Schedule>  pantoprazole  Injectable 40 daily  polyethylene glycol 3350 17 daily  predniSONE   Tablet 20 daily  senna Syrup 10 at bedtime  sirolimus Solution 1.5 <User Schedule>      Vital Signs Last 24 Hrs  T(C): 36.6 (05 Aug 2022 15:57), Max: 37.1 (04 Aug 2022 23:00)  T(F): 97.8 (05 Aug 2022 15:57), Max: 98.7 (04 Aug 2022 23:00)  HR: 53 (05 Aug 2022 19:00) (47 - 56)  BP: 144/65 (05 Aug 2022 19:00) (106/51 - 167/70)  BP(mean): 93 (05 Aug 2022 19:00) (74 - 104)  RR: 17 (05 Aug 2022 19:00) (10 - 23)  SpO2: 96% (05 Aug 2022 19:00) (95% - 100%)    Parameters below as of 05 Aug 2022 17:00  Patient On (Oxygen Delivery Method): room air        PHYSICAL EXAMINATION:  General: Alert and Awake, NAD  HEENT: PERRL, EOMI  Neck: Supple  Cardiac: RRR, No M/R/G  Resp: CTAB, No Wh/Rh/Ra  Abdomen: NBS, NT/ND, No HSM, No rigidity or guarding  MSK: No LE edema. No Calf tenderness  : No tucker  Skin: No rashes or lesions. Skin is warm and dry to the touch.   Neuro: Alert and Awake. CN 2-12 Grossly intact. Moves all four extremities spontaneously.  Psych: Calm, Pleasant, Cooperative                          9.7    30.60 )-----------( 405      ( 05 Aug 2022 04:18 )             29.9       08-05    130<L>  |  90<L>  |  63<H>  ----------------------------<  189<H>  4.8   |  23  |  3.29<H>    Ca    8.4      05 Aug 2022 17:52  Phos  4.3     08-05  Mg     2.2     08-05    TPro  5.8<L>  /  Alb  2.2<L>  /  TBili  0.3  /  DBili  0.2  /  AST  29  /  ALT  28  /  AlkPhos  249<H>  08-05          MICROBIOLOGY:  Vancomycin Level, Random: 16.8 ug/mL (08-05-22 @ 04:18)  v  .Blood Blood  08-03-22   No growth to date.  --  --      .Blood Blood-Peripheral  07-30-22   No Growth Final  --  --      .Blood Blood-Peripheral  07-30-22   No Growth Final  --  --      .Blood Blood  07-27-22   No growth  --  --      Catheterized Catheterized  07-25-22   50,000 - 99,000 CFU/mL Klebsiella pneumoniae ESBL  --  Klebsiella pneumoniae ESBL      .Blood Blood  07-25-22   No Growth Final  --  --      .Blood Blood  07-25-22   No Growth Final  --  --      .Blood Blood  07-15-22   No Growth Final  --  --      .Blood Blood  07-15-22   No Growth Final  --  --      Clean Catch Clean Catch (Midstream)  07-13-22   50,000 - 99,000 CFU/mL Klebsiella pneumoniae ESBL  --  Klebsiella pneumoniae ESBL                RADIOLOGY:    <The imaging below has been reviewed and visualized by me independently. Findings as detailed in report below> Follow Up:  Leukocytosis    Interval History: afebrile overnight. continued to note RLQ pain.     REVIEW OF SYSTEMS  [  ] ROS unobtainable because:    [ x ] All other systems negative except as noted below    Constitutional:  [ ] fever [ ] chills  [ ] weight loss  [ ] weakness  Skin:  [ ] rash [ ] phlebitis	  Eyes: [ ] icterus [ ] pain  [ ] discharge	  ENMT: [ ] sore throat  [ ] thrush [ ] ulcers [ ] exudates  Respiratory: [ ] dyspnea [ ] hemoptysis [ ] cough [ ] sputum	  Cardiovascular:  [ ] chest pain [ ] palpitations [ ] edema	  Gastrointestinal:  [ ] nausea [ ] vomiting [ ] diarrhea [ ] constipation [x ] pain	  Genitourinary:  [ ] dysuria [ ] frequency [ ] hematuria [ ] discharge [ ] flank pain  [ ] incontinence  Musculoskeletal:  [ ] myalgias [ ] arthralgias [ ] arthritis  [ ] back pain  Neurological:  [ ] headache [ ] seizures  [ ] confusion/altered mental status    Allergies  No Known Allergies        ANTIMICROBIALS:  trimethoprim  40 mG/sulfamethoxazole 200 mG Suspension 80 daily  valGANciclovir 50 mG/mL Oral Solution 450 <User Schedule>      OTHER MEDS:  MEDICATIONS  (STANDING):  acetaminophen    Suspension .. 650 every 6 hours PRN  amLODIPine   Tablet 10 daily  doxazosin 4 <User Schedule>  epoetin cortney-epbx (RETACRIT) Injectable 49566 every 7 days  finasteride 5 daily  fosphenytoin IVPB 200 every 12 hours  hydrALAZINE 25 every 8 hours  insulin lispro (ADMELOG) corrective regimen sliding scale  every 6 hours  levothyroxine Injectable 37.5 at bedtime  melatonin 6 at bedtime  mycophenolate mofetil Suspension 1000 <User Schedule>  pantoprazole  Injectable 40 daily  polyethylene glycol 3350 17 daily  predniSONE   Tablet 20 daily  senna Syrup 10 at bedtime  sirolimus Solution 1.5 <User Schedule>      Vital Signs Last 24 Hrs  T(C): 36.6 (05 Aug 2022 15:57), Max: 37.1 (04 Aug 2022 23:00)  T(F): 97.8 (05 Aug 2022 15:57), Max: 98.7 (04 Aug 2022 23:00)  HR: 53 (05 Aug 2022 19:00) (47 - 56)  BP: 144/65 (05 Aug 2022 19:00) (106/51 - 167/70)  BP(mean): 93 (05 Aug 2022 19:00) (74 - 104)  RR: 17 (05 Aug 2022 19:00) (10 - 23)  SpO2: 96% (05 Aug 2022 19:00) (95% - 100%)    Parameters below as of 05 Aug 2022 17:00  Patient On (Oxygen Delivery Method): room air    PHYSICAL EXAMINATION:  General: Alert and Awake, NAD  Cardiac: RRR, No M/R/G  Resp: CTAB, No Wh/Rh/Ra  Abdomen: RLQ with erythema and induration overlying the renal transplant site. Tenderness to palpation over the renal transplant, No HSM, No rigidity or guarding  MSK: No LE edema. No Calf tenderness  Skin: No rashes or lesions. Skin is warm and dry to the touch.   Neuro: Alert and Awake. CN 2-12 Grossly intact. Moves all four extremities spontaneously.  Psych: Calm, Pleasant, Cooperative                          9.7    30.60 )-----------( 405      ( 05 Aug 2022 04:18 )             29.9       08-05    130<L>  |  90<L>  |  63<H>  ----------------------------<  189<H>  4.8   |  23  |  3.29<H>    Ca    8.4      05 Aug 2022 17:52  Phos  4.3     08-05  Mg     2.2     08-05    TPro  5.8<L>  /  Alb  2.2<L>  /  TBili  0.3  /  DBili  0.2  /  AST  29  /  ALT  28  /  AlkPhos  249<H>  08-05          MICROBIOLOGY:  Vancomycin Level, Random: 16.8 ug/mL (08-05-22 @ 04:18)  v  .Blood Blood  08-03-22   No growth to date.  --  --      .Blood Blood-Peripheral  07-30-22   No Growth Final  --  --      .Blood Blood-Peripheral  07-30-22   No Growth Final  --  --      .Blood Blood  07-27-22   No growth  --  --      Catheterized Catheterized  07-25-22   50,000 - 99,000 CFU/mL Klebsiella pneumoniae ESBL  --  Klebsiella pneumoniae ESBL      .Blood Blood  07-25-22   No Growth Final  --  --      .Blood Blood  07-25-22   No Growth Final  --  --      .Blood Blood  07-15-22   No Growth Final  --  --      .Blood Blood  07-15-22   No Growth Final  --  --      Clean Catch Clean Catch (Midstream)  07-13-22   50,000 - 99,000 CFU/mL Klebsiella pneumoniae ESBL  --  Klebsiella pneumoniae ESBL      RADIOLOGY:    <The imaging below has been reviewed and visualized by me independently. Findings as detailed in report below>    < from: CT Abdomen and Pelvis No Cont (08.05.22 @ 09:53) >  IMPRESSION:    No drainable fluid collections.    Right psoas retroperitoneal hematoma.    Worsening bilateral pleural effusions.    < end of copied text >

## 2022-08-06 NOTE — PROGRESS NOTE ADULT - ASSESSMENT
66 year old male with PMH of  DM type II, HTN, CAD s/p CABG in 2020, Afib on Eliquis, CVA in 2019 due to Afib, Seizure d/o (last episode was on 4/8/22), h/o GI bleed in 2/2022 EGD with duodenal ulcer and ESRD on HD s/p R DDRT from DCD donor on 4/21/22 complicated by DGF requiring HD until 4/29/22, later had good graft function who was readmitted with status epilepticus in setting of UTI/antibiotics and sub-therapeutic valproic acid level.  Transferred to SICU on 7/25 with signs of sepsis. Biopsy from 7/29 demonstrating grade 1a rejection. Started pulse steroids (Solumedrol 500 on 7/30 -> 250 on 7/31).     1. 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 s/p pulse dose steroids. Thymo was not given to treat the rejection as he is too frail and at increased risk for infections. Now with failed allograft function, Remains anuric and is dialysis dependent.   No  DSA.   Last IHD was on 8/5. Will do IHD again  today as he is volume overloaded, with UF goal of 2L   The dialysis cathter needs to be replaced as we are able to get a BFR of only 200    2. IS meds- was on Belatacept. now on Sirolimus ,  mg po bid and steroid taper.   3. AMS , seizures - had 3 episodes of seizures on 8/3. CT head on 8/3 was negative.  On Fosphenytoin Neurology recs appreciated.  4. DM -  on Lantus and lispro.   5. Cellulitis over RLQ - WBC improved to 22K today . on Vancomycin . CT A/P revealed a retroperitoneal hematoma. Txp USG done, will f/u results.   f/u  cultures   6.  ESBL klebsiella UTI (7/15 + UCx, 7/25 +UCx)- completed a course of  Ertapenam on  8/3 + Fluconazole.

## 2022-08-06 NOTE — PROGRESS NOTE ADULT - SUBJECTIVE AND OBJECTIVE BOX
[facility] DIVISION OF KIDNEY DISEASES AND HYPERTENSION -- FOLLOW UP NOTE  --------------------------------------------------------------------------------  Chief Complaint:    24 hour events/subjective:  Patient was seen and examined at bedside  WBC improved from 30K to 22K today   mentation slightly improved today   no seizures since 8/3    PAST HISTORY  --------------------------------------------------------------------------------  No significant changes to PMH, PSH, FHx, SHx, unless otherwise noted    ALLERGIES & MEDICATIONS  --------------------------------------------------------------------------------  Allergies    No Known Allergies    Intolerances      Standing Inpatient Medications  amLODIPine   Tablet 10 milliGRAM(s) Oral daily  chlorhexidine 2% Cloths 1 Application(s) Topical <User Schedule>  doxazosin 4 milliGRAM(s) Oral <User Schedule>  epoetin cortney-epbx (RETACRIT) Injectable 97409 Unit(s) SubCutaneous every 7 days  finasteride 5 milliGRAM(s) Oral daily  fosphenytoin IVPB 200 milliGRAM(s) PE IV Intermittent every 12 hours  hydrALAZINE 25 milliGRAM(s) Oral every 8 hours  insulin lispro (ADMELOG) corrective regimen sliding scale   SubCutaneous every 6 hours  levothyroxine Injectable 37.5 MICROGram(s) IV Push at bedtime  melatonin 6 milliGRAM(s) Oral at bedtime  mycophenolate mofetil Suspension 1000 milliGRAM(s) Enteral Tube <User Schedule>  pantoprazole  Injectable 40 milliGRAM(s) IV Push daily  polyethylene glycol 3350 17 Gram(s) Oral daily  predniSONE   Tablet 20 milliGRAM(s) Oral daily  senna Syrup 10 milliLiter(s) Oral at bedtime  sirolimus Solution 1.5 milliGRAM(s) Oral <User Schedule>  trimethoprim  40 mG/sulfamethoxazole 200 mG Suspension 80 milliGRAM(s) Oral daily  valGANciclovir 50 mG/mL Oral Solution 450 milliGRAM(s) Oral <User Schedule>    PRN Inpatient Medications  acetaminophen    Suspension .. 650 milliGRAM(s) Enteral Tube every 6 hours PRN      REVIEW OF SYSTEMS- unable to obtain       VITALS/PHYSICAL EXAM  --------------------------------------------------------------------------------  T(C): 36.8 (08-06-22 @ 07:00), Max: 37.2 (08-05-22 @ 23:00)  HR: 50 (08-06-22 @ 09:00) (50 - 56)  BP: 170/77 (08-06-22 @ 09:00) (106/51 - 170/77)  RR: 16 (08-06-22 @ 09:00) (12 - 26)  SpO2: 99% (08-06-22 @ 09:00) (95% - 100%)  Wt(kg): --        08-05-22 @ 07:01  -  08-06-22 @ 07:00  --------------------------------------------------------  IN: 960 mL / OUT: 1500 mL / NET: -540 mL    08-06-22 @ 07:01  -  08-06-22 @ 09:50  --------------------------------------------------------  IN: 140 mL / OUT: 0 mL / NET: 140 mL      Physical Exam:             Gen: more awake and responsive today   	Pulm: CTA B/L  	CV: RRR, S1S2; no rub  	Abd: soft, slightly distended                  Transplant: + erythema and induration overlying transplant site.+ Tender and warm.   	: ++scrotal swelling             Extremities + b/l LE and UE edema  	Neuro: more wake today. AAOX 1-2  	Skin: Warm, without rashes    LABS/STUDIES  --------------------------------------------------------------------------------              9.7    22.92 >-----------<  386      [08-06-22 @ 05:37]              29.5     131  |  91  |  72  ----------------------------<  160      [08-06-22 @ 05:37]  4.6   |  23  |  3.91        Ca     8.6     [08-06-22 @ 05:37]      Mg     2.2     [08-06-22 @ 05:37]      Phos  5.2     [08-06-22 @ 05:37]    TPro  5.9  /  Alb  2.1  /  TBili  0.3  /  DBili  0.2  /  AST  26  /  ALT  21  /  AlkPhos  308  [08-06-22 @ 05:37]    PT/INR: PT 14.8 , INR 1.27       [08-06-22 @ 05:37]  PTT: 31.1       [08-06-22 @ 05:37]      Creatinine Trend:  SCr 3.91 [08-06 @ 05:37]  SCr 3.29 [08-05 @ 17:52]  SCr 4.17 [08-05 @ 04:17]    SCr 3.97 [08-04 @ 22:25]  SCr 3.78 [08-04 @ 09:39]        Sirolimus (Rapamycin) Level, Serum: 3.4 ng/mL (08-05 @ 04:17)  Sirolimus (Rapamycin) Level, Serum: 5.0 ng/mL (08-03 @ 22:24)  Sirolimus (Rapamycin) Level, Serum: 6.1 ng/mL (08-02 @ 23:18)  Sirolimus (Rapamycin) Level, Serum: 6.7 ng/mL (08-01 @ 23:41)        Urinalysis - [07-25-22 @ 18:28]      Color Yellow / Appearance Slightly Turbid / SG 1.020 / pH 5.5      Gluc Negative / Ketone Negative  / Bili Negative / Urobili Negative       Blood Small / Protein 100 / Leuk Est Large / Nitrite Negative      RBC 2 / WBC 40 / Hyaline 5 / Gran  / Sq Epi  / Non Sq Epi 0 / Bacteria Many      Iron 17, TIBC 171, %sat 10      [05-02-22 @ 13:03]  Ferritin 6336      [07-27-22 @ 13:00]  PTH -- (Ca 9.2)      [02-05-22 @ 10:13]   294  HbA1c 6.0      [02-21-20 @ 08:53]  TSH 4.69      [07-06-22 @ 18:36]  Lipid: chol --, , HDL --, LDL --      [07-27-22 @ 13:00]    HBsAg Nonreact      [07-25-22 @ 11:51]  HCV 0.18, Nonreact      [07-25-22 @ 11:51]         APPTS ARE READY TO BE MADE: [X ] YES    Best Family or Patient Contact (if needed):    Additional Information about above appointments (if needed):    1:   2:   3:     Other comments or requests:

## 2022-08-06 NOTE — PROGRESS NOTE ADULT - SUBJECTIVE AND OBJECTIVE BOX
Patient is a 66y old  Male who presents with a chief complaint of Status Epilepticus (06 Aug 2022 04:27)    Being followed by ID for Pyleonephritis    Interval history:  Decline WBC, afebrile  No acute events      ROS:  RLQ abdominal pain  No cough, SOB, CP  No N/V/D./abd pain  No other complaints      Antimicrobials:    trimethoprim  40 mG/sulfamethoxazole 200 mG Suspension 80 milliGRAM(s) Oral daily  valGANciclovir 50 mG/mL Oral Solution 450 milliGRAM(s) Oral <User Schedule>      Vital Signs Last 24 Hrs  T(C): 36.8 (08-06-22 @ 07:00), Max: 37.2 (08-05-22 @ 23:00)  T(F): 98.3 (08-06-22 @ 07:00), Max: 99 (08-05-22 @ 23:00)  HR: 50 (08-06-22 @ 07:00) (47 - 56)  BP: 150/67 (08-06-22 @ 07:00) (106/51 - 162/72)  BP(mean): 96 (08-06-22 @ 07:00) (74 - 104)  RR: 15 (08-06-22 @ 07:00) (12 - 26)  SpO2: 98% (08-06-22 @ 07:00) (95% - 100%)    Physical Exam:    Constitutional well preserved, NAD    HEENT EOMI, No pallor or icterus    No oral exudate or erythema    Neck supple no LN    Chest Clear to auscultation    CVS S1 S2 WNl No murmur or rub or gallop    Abd soft BS normal No masses. Tender over RLQ renal transplant site    Ext No cyanosis clubbing or edema    IV site no erythema tenderness or discharge    Joints no swelling or LOM    CNS AAO X 3 non focal    Lab Data:                          9.7    22.92 )-----------( 386      ( 06 Aug 2022 05:37 )             29.5       08-06    131<L>  |  91<L>  |  72<H>  ----------------------------<  160<H>  4.6   |  23  |  3.91<H>    Ca    8.6      06 Aug 2022 05:37  Phos  5.2     08-06  Mg     2.2     08-06    TPro  5.9<L>  /  Alb  2.1<L>  /  TBili  0.3  /  DBili  0.2  /  AST  26  /  ALT  21  /  AlkPhos  308<H>  08-06        .Blood Blood  08-03-22   No growth to date.  --  --    Vancomycin Level, Random: 20.4 ug/mL (08-06-22 @ 05:37)  Vancomycin Level, Random: 16.8 ug/mL (08-05-22 @ 04:18)    < from: CT Abdomen and Pelvis No Cont (08.05.22 @ 09:53) >  ACC: [id number] EXAM:  CT CHEST                        ACC: [id number] EXAM:  CT ABDOMEN AND PELVIS                          PROCEDURE DATE:  08/05/2022          INTERPRETATION:  CLINICAL INFORMATION: Leukocytosis, recent renal   transplant.    COMPARISON: CT chest abdomen pelvis 7/25/2022 and chest CT dated 8/8/2020    CONTRAST/COMPLICATIONS:  IV Contrast: None  Oral Contrast: None  Complications: None reported.    PROCEDURE:  CT of the Chest, Abdomen and Pelvis was performed.  Sagittal and coronal reformats were performed.    FINDINGS:  CHEST:  LUNGS AND LARGE AIRWAYS: Mucous or secretions within the trachea. Apical   scarring. Interlobular septal thickening. Bilateral upper lobe calcified   granulomata. Stable right upper lobe nodules measuring up to 3-4 mm.  PLEURA: Small bilateral pleural effusions, worsening from prior CT.  VESSELS: Atherosclerotic calcifications. Left subclavian central line   terminating in SVC.  HEART: Cardiomegaly. No pericardial effusion. Coronary artery   calcifications.  MEDIASTINUM AND AUSTIN: Paratracheal lymphadenopathy, stable.  CHEST WALL AND LOWER NECK: Median sternotomy. Bilateral gynecomastia.    ABDOMEN AND PELVIS:  LIVER: Within normal limits.  BILE DUCTS: Normal caliber.  GALLBLADDER: Within normallimits.  SPLEEN: Splenic capsular calcifications.  PANCREAS: Within normal limits.  ADRENALS: Within normal limits.  KIDNEYS/URETERS: Bilateral atrophic native kidneys. Perinephric fluid   present at right lower quadrant transplant kidney, similar toprior CT.    BLADDER: Minimally distended.  REPRODUCTIVE ORGANS: Prostate within normal limits.    BOWEL: Enteric tube terminating in stomach. No bowel obstruction.   Appendix is not visualized. No evidence of inflammation in the pericecal   region.  PERITONEUM: Small volume ascites.  VESSELS: Atherosclerotic changes.  RETROPERITONEUM/LYMPH NODES: Right psoas retroperitoneal hematoma.  ABDOMINAL WALL: Postsurgical changes. Anasarca.  BONES: Degenerative changes. Chronic pelvic fracture deformities. Renal   osteodystrophy.    IMPRESSION:    No drainable fluid collections.    Right psoas retroperitoneal hematoma.    Worsening bilateral pleural effusions.    < end of copied text >           Patient is a 66y old  Male who presents with a chief complaint of Status Epilepticus (06 Aug 2022 04:27)    Being followed by ID for Pyleonephritis    Interval history:  Decline WBC, afebrile  No acute events      ROS:  RLQ abdominal pain  No cough, SOB, CP  No N/V/D./abd pain  No other complaints      Antimicrobials:    trimethoprim  40 mG/sulfamethoxazole 200 mG Suspension 80 milliGRAM(s) Oral daily  valGANciclovir 50 mG/mL Oral Solution 450 milliGRAM(s) Oral <User Schedule>      Vital Signs Last 24 Hrs  T(C): 36.8 (08-06-22 @ 07:00), Max: 37.2 (08-05-22 @ 23:00)  T(F): 98.3 (08-06-22 @ 07:00), Max: 99 (08-05-22 @ 23:00)  HR: 50 (08-06-22 @ 07:00) (47 - 56)  BP: 150/67 (08-06-22 @ 07:00) (106/51 - 162/72)  BP(mean): 96 (08-06-22 @ 07:00) (74 - 104)  RR: 15 (08-06-22 @ 07:00) (12 - 26)  SpO2: 98% (08-06-22 @ 07:00) (95% - 100%)    Physical Exam:    Constitutional well preserved, NAD    HEENT EOMI, No pallor or icterus    No oral exudate or erythema    Neck supple no LN    Chest Clear to auscultation    CVS S1 S2 WNl No murmur or rub or gallop    Abd soft BS normal No masses. Erythema and tender over RLQ renal transplant site    Ext No cyanosis clubbing or edema    IV site no erythema tenderness or discharge    Joints no swelling or LOM    CNS Lethargic, non focal    Lab Data:                          9.7    22.92 )-----------( 386      ( 06 Aug 2022 05:37 )             29.5       08-06    131<L>  |  91<L>  |  72<H>  ----------------------------<  160<H>  4.6   |  23  |  3.91<H>    Ca    8.6      06 Aug 2022 05:37  Phos  5.2     08-06  Mg     2.2     08-06    TPro  5.9<L>  /  Alb  2.1<L>  /  TBili  0.3  /  DBili  0.2  /  AST  26  /  ALT  21  /  AlkPhos  308<H>  08-06        .Blood Blood  08-03-22   No growth to date.  --  --    Vancomycin Level, Random: 20.4 ug/mL (08-06-22 @ 05:37)  Vancomycin Level, Random: 16.8 ug/mL (08-05-22 @ 04:18)    < from: CT Abdomen and Pelvis No Cont (08.05.22 @ 09:53) >  ACC: [id number] EXAM:  CT CHEST                        ACC: [id number] EXAM:  CT ABDOMEN AND PELVIS                          PROCEDURE DATE:  08/05/2022          INTERPRETATION:  CLINICAL INFORMATION: Leukocytosis, recent renal   transplant.    COMPARISON: CT chest abdomen pelvis 7/25/2022 and chest CT dated 8/8/2020    CONTRAST/COMPLICATIONS:  IV Contrast: None  Oral Contrast: None  Complications: None reported.    PROCEDURE:  CT of the Chest, Abdomen and Pelvis was performed.  Sagittal and coronal reformats were performed.    FINDINGS:  CHEST:  LUNGS AND LARGE AIRWAYS: Mucous or secretions within the trachea. Apical   scarring. Interlobular septal thickening. Bilateral upper lobe calcified   granulomata. Stable right upper lobe nodules measuring up to 3-4 mm.  PLEURA: Small bilateral pleural effusions, worsening from prior CT.  VESSELS: Atherosclerotic calcifications. Left subclavian central line   terminating in SVC.  HEART: Cardiomegaly. No pericardial effusion. Coronary artery   calcifications.  MEDIASTINUM AND AUSTIN: Paratracheal lymphadenopathy, stable.  CHEST WALL AND LOWER NECK: Median sternotomy. Bilateral gynecomastia.    ABDOMEN AND PELVIS:  LIVER: Within normal limits.  BILE DUCTS: Normal caliber.  GALLBLADDER: Within normallimits.  SPLEEN: Splenic capsular calcifications.  PANCREAS: Within normal limits.  ADRENALS: Within normal limits.  KIDNEYS/URETERS: Bilateral atrophic native kidneys. Perinephric fluid   present at right lower quadrant transplant kidney, similar toprior CT.    BLADDER: Minimally distended.  REPRODUCTIVE ORGANS: Prostate within normal limits.    BOWEL: Enteric tube terminating in stomach. No bowel obstruction.   Appendix is not visualized. No evidence of inflammation in the pericecal   region.  PERITONEUM: Small volume ascites.  VESSELS: Atherosclerotic changes.  RETROPERITONEUM/LYMPH NODES: Right psoas retroperitoneal hematoma.  ABDOMINAL WALL: Postsurgical changes. Anasarca.  BONES: Degenerative changes. Chronic pelvic fracture deformities. Renal   osteodystrophy.    IMPRESSION:    No drainable fluid collections.    Right psoas retroperitoneal hematoma.    Worsening bilateral pleural effusions.    < end of copied text >

## 2022-08-06 NOTE — EEG REPORT - NS EEG TEXT BOX
MANJINDERARAMY [id number]     Study Date: 08-05-22 [id number] 8/5/22  Study hours: 11:19 Hrs    --------------------------------------------------------------------------------------------------  History:  CC/ HPI Patient is a 66y old  Male who presents with a chief complaint of Status Epilepticus (04 Aug 2022 12:41)    MEDICATIONS  (STANDING):  chlorhexidine 2% Cloths 1 Application(s) Topical <User Schedule>  doxazosin 4 milliGRAM(s) Oral <User Schedule>  epoetin cortney-epbx (RETACRIT) Injectable 83863 Unit(s) SubCutaneous every 7 days  fosphenytoin IVPB 200 milliGRAM(s) PE IV Intermittent every 12 hours  heparin   Injectable 5000 Unit(s) SubCutaneous every 8 hours  hydrALAZINE 25 milliGRAM(s) Oral every 8 hours  insulin lispro (ADMELOG) corrective regimen sliding scale   SubCutaneous every 6 hours  melatonin 6 milliGRAM(s) Oral at bedtime  mycophenolate mofetil Suspension 1000 milliGRAM(s) Enteral Tube <User Schedule>  pantoprazole  Injectable 40 milliGRAM(s) IV Push daily  senna Syrup 10 milliLiter(s) Oral at bedtime  sodium bicarbonate 1300 milliGRAM(s) Oral <User Schedule>    --------------------------------------------------------------------------------------------------  Study Interpretation:    [Abbreviation Key:  PDR=alpha rhythm/posterior dominant rhythm. A-P=anterior posterior.  Amplitude: ‘very low’:<20; ‘low’:20-49; ‘medium’:; ‘high’:>150uV.  Persistence for periodic/rhythmic patterns (% of epoch) ‘rare’:<1%; ‘occasional’:1-10%; ‘frequent’:10-50%; ‘abundant’:50-90%; ‘continuous’:>90%.  Persistence for sporadic discharges: ‘rare’:<1/hr; ‘occasional’:1/min-1/hr; ‘frequent’:>1/min; ‘abundant’:>1/10 sec.  RPP=rhythmic and periodic patterns; GRDA=generalized rhythmic delta activity; FIRDA=frontal intermittent GRDA; LRDA=lateralized rhythmic delta activity; TIRDA=temporal intermittent rhythmic delta activity;  LPD=PLED=lateralized periodic discharges; GPD=generalized periodic discharges; BIPDs =bilateral independent periodic discharges; Mf=multifocal; SIRPDs=stimulus induced rhythmic, periodic, or ictal appearing discharges; BIRDs=brief potentially ictal rhythmic discharges >4 Hz, lasting .5-10s; PFA (paroxysmal bursts >13 Hz or =8 Hz <10s).  Modifiers: +F=with fast component; +S=with spike component; +R=with rhythmic component.  S-B=burst suppression pattern.  Max=maximal. N1-drowsy; N2-stage II sleep; N3-slow wave sleep. SSS/BETS=small sharp spikes/benign epileptiform transients of sleep. HV=hyperventilation; PS=photic stimulation]    Daily EEG Visual Analysis  FINDINGS:      Background:  Continuous: continuous  Symmetry: symmetric  PDR: 7.5-8 Hz activity over the right hemisphere, with amplitude to 40 uV, that attenuated to eye opening.    Reactivity: present  Voltage: normal (between 20-150uV)  Anterior Posterior Gradient: present  Other background findings: none  Breach: absent    Background Slowing:  Generalized slowing: mild theta slowing   Focal slowing: left hemispheric theta and delta slowing    State Changes:   -Drowsiness noted with increased slowing, attenuation of fast activity, vertex transients.  -Present with N2 sleep transients with symmetric spindles and K-complexes.    Sporadic Epileptiform Discharges:    None    Rhythmic and Periodic Patterns (RPPs):  None     Electrographic and Electroclinical seizures:  none     Other Clinical Events:  None    Activation Procedures:   -Hyperventilation was not performed.    -Photic stimulation was not performed.     Artifacts:  Intermittent myogenic and movement artifacts were noted.    ECG:  The heart rate on single channel ECG was predominantly between 50-60 BPM.    EEG Classification / Summary:  Abnormal EEG study  Mild focal left centroparietal slowing   Mild generalized background slowing    -----------------------------------------------------------------------------------------------------  Clinical Impression:  Left hemispheric dysfunction  Mild diffuse cerebral dysfunction, not specific as to etiology.  No seizures since 8/4/22 5AM     Robert Stokes MD  Epilepsy Fellow , [facility]  Department of Neurology, [employer] [facility] [employer]    [facility] EEG Reading Room Ph#: [phone]  Epilepsy Answering Service after 5PM and before 8:30AM: Ph#: [phone]

## 2022-08-06 NOTE — PROGRESS NOTE ADULT - SUBJECTIVE AND OBJECTIVE BOX
[facility] NEPHROLOGY PRACT[facility]   MD NINA MASON PA MIJUNG SHIN NP     TEL:  OFFICE: [phone]  DR HSU CELL: [phone]  DR. MARQUEZ CELL: [phone]  MARILYNN MORELOS CELL: [phone]  NP Lillian Buchanan CELL: [phone]    From 5pm-7am Answering Service [phone]    -- RENAL FOLLOW UP NOTE ---Date of Service 08-06-22 @ 15:06    Patient is a 66y old  Male who presents with a chief complaint of Status Epilepticus (06 Aug 2022 11:40)      Patient seen and examined at bedside. No chest pain/sob    VITALS:  T(F): 97.6 (08-06-22 @ 12:00), Max: 99 (08-05-22 @ 23:00)  HR: 49 (08-06-22 @ 14:00)  BP: 162/68 (08-06-22 @ 14:00)  RR: 15 (08-06-22 @ 14:00)  SpO2: 100% (08-06-22 @ 14:00)  Wt(kg): --    08-05 @ 07:01  -  08-06 @ 07:00  --------------------------------------------------------  IN: 960 mL / OUT: 1500 mL / NET: -540 mL    08-06 @ 07:01  -  08-06 @ 15:06  --------------------------------------------------------  IN: 400 mL / OUT: 0 mL / NET: 400 mL          PHYSICAL EXAM:  Constitutional: NAD  Neck: No JVD  Respiratory: CTAB, no wheezes, rales or rhonchi  Cardiovascular: S1, S2, RRR  Gastrointestinal: BS+, soft, NT/ND  Extremities: No peripheral edema    Hospital Medications:   MEDICATIONS  (STANDING):  amLODIPine   Tablet 10 milliGRAM(s) Oral daily  chlorhexidine 2% Cloths 1 Application(s) Topical <User Schedule>  doxazosin 4 milliGRAM(s) Oral <User Schedule>  epoetin cortney-epbx (RETACRIT) Injectable 68240 Unit(s) SubCutaneous every 7 days  finasteride 5 milliGRAM(s) Oral daily  fosphenytoin IVPB 200 milliGRAM(s) PE IV Intermittent every 12 hours  hydrALAZINE 25 milliGRAM(s) Oral every 8 hours  insulin lispro (ADMELOG) corrective regimen sliding scale   SubCutaneous every 6 hours  levothyroxine Injectable 37.5 MICROGram(s) IV Push at bedtime  melatonin 6 milliGRAM(s) Oral at bedtime  mycophenolate mofetil Suspension 1000 milliGRAM(s) Enteral Tube <User Schedule>  pantoprazole  Injectable 40 milliGRAM(s) IV Push daily  polyethylene glycol 3350 17 Gram(s) Oral daily  predniSONE   Tablet 20 milliGRAM(s) Oral daily  senna Syrup 10 milliLiter(s) Oral at bedtime  trimethoprim  40 mG/sulfamethoxazole 200 mG Suspension 80 milliGRAM(s) Oral daily  valGANciclovir 50 mG/mL Oral Solution 450 milliGRAM(s) Oral <User Schedule>      LABS:  08-06    131<L>  |  91<L>  |  72<H>  ----------------------------<  160<H>  4.6   |  23  |  3.91<H>    Ca    8.6      06 Aug 2022 05:37  Phos  5.2     08-06  Mg     2.2     08-06    TPro  5.9<L>  /  Alb  2.1<L>  /  TBili  0.3  /  DBili  0.2  /  AST  26  /  ALT  21  /  AlkPhos  308<H>  08-06    Creatinine Trend: 3.91 <--, 3.29 <--, 4.17 <--, 3.97 <--, 3.78 <--, 3.37 <--, 4.40 <--, 4.26 <--, 4.09 <--, 3.69 <--, 3.59 <--, 2.52 <--    Albumin, Serum: 2.1 g/dL (08-06 @ 05:37)  Phosphorus Level, Serum: 5.2 mg/dL (08-06 @ 05:37)  Phosphorus Level, Serum: 4.3 mg/dL (08-05 @ 17:52)                              9.7    22.92 )-----------( 386      ( 06 Aug 2022 05:37 )             29.5     Urine Studies:  Urinalysis - [07-25-22 @ 18:28]      Color Yellow / Appearance Slightly Turbid / SG 1.020 / pH 5.5      Gluc Negative / Ketone Negative  / Bili Negative / Urobili Negative       Blood Small / Protein 100 / Leuk Est Large / Nitrite Negative      RBC 2 / WBC 40 / Hyaline 5 / Gran  / Sq Epi  / Non Sq Epi 0 / Bacteria Many      Iron 17, TIBC 171, %sat 10      [05-02-22 @ 13:03]  Ferritin 6336      [07-27-22 @ 13:00]  PTH -- (Ca 9.2)      [02-05-22 @ 10:13]   294  HbA1c 6.0      [02-21-20 @ 08:53]  TSH 4.69      [07-06-22 @ 18:36]  Lipid: chol --, , HDL --, LDL --      [07-27-22 @ 13:00]    HBsAg Nonreact      [07-25-22 @ 11:51]  HCV 0.18, Nonreact      [07-25-22 @ 11:51]      RADIOLOGY & ADDITIONAL STUDIES:   [facility] NEPHROLOGY PRACT[facility]   MD NINA MASON PA MIJUNG SHIN NP     TEL:  OFFICE: [phone]    From 5pm-7am Answering Service [phone]    -- RENAL FOLLOW UP NOTE ---Date of Service 08-06-22 @ 15:06    Patient is a 66y old  Male who presents with a chief complaint of Status Epilepticus (06 Aug 2022 11:40)      Patient seen and examined at bedside. No chest pain/sob    VITALS:  T(F): 97.6 (08-06-22 @ 12:00), Max: 99 (08-05-22 @ 23:00)  HR: 49 (08-06-22 @ 14:00)  BP: 162/68 (08-06-22 @ 14:00)  RR: 15 (08-06-22 @ 14:00)  SpO2: 100% (08-06-22 @ 14:00)  Wt(kg): --    08-05 @ 07:01  -  08-06 @ 07:00  --------------------------------------------------------  IN: 960 mL / OUT: 1500 mL / NET: -540 mL    08-06 @ 07:01  -  08-06 @ 15:06  --------------------------------------------------------  IN: 400 mL / OUT: 0 mL / NET: 400 mL          PHYSICAL EXAM:  Constitutional: NAD, resting  Neck: No JVD  Respiratory: CTAB, no wheezes, rales or rhonchi  Cardiovascular: S1, S2, RRR  Gastrointestinal: BS+, soft, NT/ND, NG tube(+)  Neurology: alert and awake, no focal deficit  Extremities: No peripheral edema  vascular access: HD [facility]    Hospital Medications:   MEDICATIONS  (STANDING):  amLODIPine   Tablet 10 milliGRAM(s) Oral daily  chlorhexidine 2% Cloths 1 Application(s) Topical <User Schedule>  doxazosin 4 milliGRAM(s) Oral <User Schedule>  epoetin cortney-epbx (RETACRIT) Injectable 53123 Unit(s) SubCutaneous every 7 days  finasteride 5 milliGRAM(s) Oral daily  fosphenytoin IVPB 200 milliGRAM(s) PE IV Intermittent every 12 hours  hydrALAZINE 25 milliGRAM(s) Oral every 8 hours  insulin lispro (ADMELOG) corrective regimen sliding scale   SubCutaneous every 6 hours  levothyroxine Injectable 37.5 MICROGram(s) IV Push at bedtime  melatonin 6 milliGRAM(s) Oral at bedtime  mycophenolate mofetil Suspension 1000 milliGRAM(s) Enteral Tube <User Schedule>  pantoprazole  Injectable 40 milliGRAM(s) IV Push daily  polyethylene glycol 3350 17 Gram(s) Oral daily  predniSONE   Tablet 20 milliGRAM(s) Oral daily  senna Syrup 10 milliLiter(s) Oral at bedtime  trimethoprim  40 mG/sulfamethoxazole 200 mG Suspension 80 milliGRAM(s) Oral daily  valGANciclovir 50 mG/mL Oral Solution 450 milliGRAM(s) Oral <User Schedule>      LABS:  08-06    131<L>  |  91<L>  |  72<H>  ----------------------------<  160<H>  4.6   |  23  |  3.91<H>    Ca    8.6      06 Aug 2022 05:37  Phos  5.2     08-06  Mg     2.2     08-06    TPro  5.9<L>  /  Alb  2.1<L>  /  TBili  0.3  /  DBili  0.2  /  AST  26  /  ALT  21  /  AlkPhos  308<H>  08-06    Creatinine Trend: 3.91 <--, 3.29 <--, 4.17 <--, 3.97 <--, 3.78 <--, 3.37 <--, 4.40 <--, 4.26 <--, 4.09 <--, 3.69 <--, 3.59 <--, 2.52 <--    Albumin, Serum: 2.1 g/dL (08-06 @ 05:37)  Phosphorus Level, Serum: 5.2 mg/dL (08-06 @ 05:37)  Phosphorus Level, Serum: 4.3 mg/dL (08-05 @ 17:52)                              9.7    22.92 )-----------( 386      ( 06 Aug 2022 05:37 )             29.5     Urine Studies:  Urinalysis - [07-25-22 @ 18:28]      Color Yellow / Appearance Slightly Turbid / SG 1.020 / pH 5.5      Gluc Negative / Ketone Negative  / Bili Negative / Urobili Negative       Blood Small / Protein 100 / Leuk Est Large / Nitrite Negative      RBC 2 / WBC 40 / Hyaline 5 / Gran  / Sq Epi  / Non Sq Epi 0 / Bacteria Many      Iron 17, TIBC 171, %sat 10      [05-02-22 @ 13:03]  Ferritin 6336      [07-27-22 @ 13:00]  PTH -- (Ca 9.2)      [02-05-22 @ 10:13]   294  HbA1c 6.0      [02-21-20 @ 08:53]  TSH 4.69      [07-06-22 @ 18:36]  Lipid: chol --, , HDL --, LDL --      [07-27-22 @ 13:00]    HBsAg Nonreact      [07-25-22 @ 11:51]  HCV 0.18, Nonreact      [07-25-22 @ 11:51]      RADIOLOGY & ADDITIONAL STUDIES:

## 2022-08-06 NOTE — PROGRESS NOTE ADULT - CRITICAL CARE SERVICES PROVIDED
Patient is critically ill, requiring critical care services.

## 2022-08-06 NOTE — PROGRESS NOTE ADULT - ASSESSMENT
66 year old Male with PMHx ESRD s/p PermCath removal 5/10/22 s/p Rt DDRT 4/21/22,  CVA (2019), Afib on apixaban, seizure activity, CAD s/p stents, CABG (2020), HTN, HLD, DM on insulin, gastric/duodenal ulcer, recent hospitalization 4/28/22 -5/10/22 with weakness/anemia found to have perinephric hematoma requiring evacuation and repair of bleeding arterial anastomosis who presented to [facility] for status epilepticus requiring intubation for hypoxic respiratory failure.     Subsequently developed continued seizures and hypothermia -> resolved   Was initiated on empiric antibiotics  s/p LP which was not suggestive of infectious process  Blood, urine and sputum cultures without positive sample    U/A (7/12) 161 WBC  UCx (7/13) 50-99K ESBL Klebsiella   s/p 3 days of Ertapenem    UA (7/25) with 40 WBC  UCx (7/25) 50-99k ESBL Klebsiella     Transaminitis trending down   Fever curve improved   S/p HD via L chest HD cath    RUQ (7/25) with hepatomegaly   CT c/a/p (7/25) with only small perinephric fluid collection, small volume ascites.  Doppler US R Kidney (7/27) with mild thickening of collecting system and ureter and small   Doppler US R Kidney (7/29) with fullness of collecting system and continued urothelial thickening.     RVP (7/25) Negative  CMV PCR (7/22) Negative  EBV PCR (7/25) Negative   HBV PCR (7/25) Negative   HHV-6 PCR (7/25) POSITIVE   Parvovirus IgM and PCR (7/26) Negative   HSV 1/2 PCR (7/26) Negative   Adenovirus PCR (7/26) Negative    CT A/P (8/5) with No drainable fluid collections and Right psoas retroperitoneal hematoma.    #Rash (Erythema overlying Renal Transplant), Leukocytosis,  Renal Transplant Recipient  Developed seizure again overnight 8/3 > 8/4  Significant increase in leukocytosis  Some of the leukocytosis can be attributed to corticosteroids and stress-induced rise  However, now with increase in induration and now erythema of the abdominal wall overlying the renal transplant site  --Continue Vancomycin by level  --Monitor off of further Ertapenem (completed 10 days of carbapenem for Transplant Pyelonephritis)  --Continue to follow CBC with diff  --Continue to follow temperature curve  --Follow up on preliminary blood cultures - NGTD    I will be away over this upcoming weekend. Please contact the Infectious Diseases Office with any further questions or concerns.     Donte Mcintyre MD  pager [phone]  office [phone]  Over the weekend please contact the Infectious Diseases Office at [phone]

## 2022-08-06 NOTE — PROGRESS NOTE ADULT - ASSESSMENT
Mr. Castelan is a 67 y/o M with a PMH of T2DM, ESRD ( HD via R. AV fistula since 2019), HTN, CAD s/p CABG 2020, A. Fib ( On Elqiuis), CVA ( 2019 2/2 A. Fib), Seizure d/o after CVA in 2019, GIB ( 02/2022) 2/2 duodenal ulcer, and ESRD s/p DDRT 4/21/22 c/b DGF requiring HD & large perinephric hematoma 5/22 s/p evacuation w/ revision of arterial anastomosis. Pt presented from rehab on 6/10/22 2/2 status epilepticus requiring intubation for airway protection with a hospital course c/b large right pleural effusion s/p thoracentesis c/b hemothorax while being anticoagulated & requiring intubation for airway protectio. Pt transferred to SICU s/p VATS with a second chest tube placed. Pt transferred to floor 7/10 and readmitted to SICU 7/25 for AMS, found to have ESBL klebsiella UTI, ANA, with renal rejection, and renal failure requiring HD. Course further c/b 3 focal seizures s/p EEG and antiepileptic therapy with Fosphenytoin Pt remains in SICU for further hemodynamic monitoring and management.     Neuro: encephalopathy improved, hx of seizure  - EEG off  - D/c Phenytoin and start Fosphenytoin 200mg IV BID  - S/p Fycompa, previously successful antiepileptic therapy with notable psych changes   - Multimodal pain control w/Tylenol  - Melatonin with Protected sleep to improve delirium  - CT head (08/04): Negative for  acute hemorrhage  - Neurology following, recs appreciated     Resp: No acute issues  - Maintain SpO2 >92%   - Out of bed to chair, ambulate as tolerated, and incentive spirometry to prevent atelectasis    CVS: hx of HTN, s/p CABG, AF on Eliquis  - Maintain MAP > 65  - Continue Hydralazine 25mg q8h and Amlodipine 10mg qd     GI: transaminitis  - NGT in place continue Nepro TF @ 20cc   - Protonix 40mg IV daily for ppx   - Bowel regimen with Miralax,  senna     Renal: DDRT 4/21 cb DGF, now c/b acute rejection s/p IR renal bx 7/29  - Pt Anuric, s/p HD 2L removed ( 8/4)   - Doxazosin + Proscar  - d/c Na bicarb and start Na chloride   - Continue immunosuppression with sirolimus, cellcept,a nd prednisone taper per Transplant     Heme:  - Monitor CBC and coags  - SCDs for mechanical VTe ppx   - Lovenox 60 qd for AC, will f/u Anti-Xa level     ID: ESBL klebsiella UTI (7/15 + UCx, 7/25 +UCx)  - Monitor WBC, temperature, and procalcitonin  - Blood cx negative 7/25, 7/27, 7/30  - F/u Repeat blood cx ( 08/04) with onset of seziures   - D/c Ertapenem and Fluconazole due to lower seizure threshold  - Start Vanco by level due to leukocytosis with surrounding erythema of Right groin incision per Transplant ID   - Plan for possible US or CT Abd/Pelvis of Right Kidney Transplant per Transplant ID    Endo: hx of T2DM, Hypothyroidism,   - Hypoglyemic episode to 50 ( 8/4) d/c Lantus and Premeal, continue low dose ISS   - Continue Home Synthroid    Code status: Full Code   Dispo: SICU

## 2022-08-06 NOTE — PROGRESS NOTE ADULT - ATTENDING COMMENTS
Pt is a 66 year old male with a medical history signficant for CAD (s/p CABG), AF (on Eliquis c/b CVA c/b seizures), HTN, HLD, DM type II, hypothyroidism, GI bleed due to a duodenal ulcer, and ESRD (s/p DDRT 4/21/22 c/b DGF requiring HD) with large perinephric hematoma (s/p evacuation w/ revision of arterial anastomosis) who presented in status epilepticus. Pt was intubated for airway protection. Pt transferred to floor 7/10 and readmitted to SICU 7/25 for AMS due to an ESBL klebsiella UTI, ANA/renal rejection, and renal failure requiring HD. No further seizures overnight. A right psoas hematoma was discovered on CT and lovenox was discontinued.    Seizure disorder  Continue dilantin and multimodal pain control  Melatonin + protected sleep to improve delirium    Atelectasis  Continue ISP    HTN/CAD, H/o CABG  AFib off AC  Continue nifedipine  Continue hydralazine  Continue coreg    Improving transaminitis  Continue to monitor  Continue tube feeds     S/p DDRT 4/21 cb DGF, now cb acute rejection, s/p IR renal bx 7/29  Continue prednisone 20mg  Monitor I&Os and electrolytes  HD performed yesterday  For repeat HD today    Off A/C for psoas hematoma    SBL klebsiella UTI (7/15 + UCx, 7/25 +UCx)  Immunosuppresion per transplant nephro     DM2  Continue SSRI . Pt is a 66 year old male with a medical history signficant for CAD (s/p CABG), AF (on Eliquis c/b CVA c/b seizures), HTN, HLD, DM type II, hypothyroidism, GI bleed due to a duodenal ulcer, and ESRD (s/p DDRT 4/21/22 c/b DGF requiring HD) with large perinephric hematoma (s/p evacuation w/ revision of arterial anastomosis) who presented in status epilepticus. Pt was intubated for airway protection. Pt transferred to floor 7/10 and readmitted to SICU 7/25 for AMS due to an ESBL klebsiella UTI, ANA/renal rejection, and renal failure requiring HD. No further seizures overnight. A right psoas hematoma was discovered on CT and lovenox was discontinued.    Seizure disorder  Continue fosphenytoin and multimodal pain control  Melatonin + protected sleep to improve delirium    Atelectasis  Continue ISP    HTN/CAD, H/o CABG  AFib off AC  Continue nifedipine  Continue hydralazine  Continue coreg    Improving transaminitis  Continue to monitor  Continue tube feeds     S/p DDRT 4/21 cb DGF, now cb acute rejection, s/p IR renal bx 7/29  Continue prednisone 20mg  Monitor I&Os and electrolytes  HD performed yesterday  For repeat HD today    Off A/C for psoas hematoma    SBL klebsiella UTI (7/15 + UCx, 7/25 +UCx)  Immunosuppresion per transplant nephro     DM2  Continue SSRI .

## 2022-08-06 NOTE — PROGRESS NOTE ADULT - SUBJECTIVE AND OBJECTIVE BOX
Transplant Surgery - Multidisciplinary Progress Note  --------------------------------------------------------------  DDRT     4/21/2022             Present:  Patient seen with multidisciplinary team including Transplant Surgeon: Dr. David,  Nephrologist: Dr. Mary Lomeli,  TAMMY Styles. Disciplines not in attendance will be notified of the plan.      66M with PMH: DM type II, HTN, CAD s/p CABG in 2020, AFib on Eliquis, CVA in 2019 due to Afib, Seizure d/o following CVA, last episode was on 4/8/22, h/o GIB in 2/2022 EGD with duodenal ulcer.  ESRD due to DM was on HD since 2019.     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  Donor was 58 , KDPI 82%, DCD, single vessels and ureter, HLA mismatch 1, 2, 2. No DSA, cPRA 0%. 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 Re-admitted in May for anemia in the setting of large perinephric hematoma s/p evacuation and repair of arterial anastomosis on 5/2/22. Intra operative biopsy showed no rejection, Creatinine ranging ~2mg/dL.    In Rehab:  He was recently dx with klebsiella UTI with Ertapenem - then switched to Levaquin    He also had parainfluenza pneumonia. Was at rehab progressing well.      Hospital course:  - 6/10: transferred from rehab facility for seizure status epilepticus. Intubated for respiratory protection and transferred to MICU.  EEG showed no seizure activity. Neuro consulted.   - 6/11: Extubated. Found to have R pleural effusion. s/p Thoracentesis 1.3L. Eliquis changed to heparin drip.  Post procedure drop in H&H. 5u PRBC, 2 u FFP.   - 6/11 evening: Transferred to SICU from MICU for further care in setting of hypoxia, significant R pleural effusion, anemia and hemodynamic instability  - 6/11 Intubated at 9pm and sedated on Precedex.  CT placed, 600ml blood drained.  1L total overnight, started pressors  - 6/11 Received Protamine for PTT >100 (was on Heparin drip started for AF), 2 u FFP and 5u PRBC total  - 6/13 s/p VATS 2.2L old blood removed; second chest tube placed  - 6/18-19: chest tubes removed  - 6/21: ?PRES vs. chronic ischemic changes on MRI, off Envarsus, switched to Belatacept 6/23 with good graft function  - 6/25: Tx to SICU for refractory seizures, improved with IV antiepileptic regimen  - 7/10: Downgraded from SICU to floor.   - 7/11: OR-->URETERAL STENT REMOVED  - 7/15: ESBL Kleb UTI (50-90K), completed Ertapenem x 3 Days  - 7/25: Tx to SICU for Sepsis/ elevated LFTS  - 7/27: Shiley placed for HD  - 7/28: ongoing fevers -> started Ertapenem/Fluc  - 7/29: HD restarted + 1U PRBC.  IR bx with 2A rejection, started pulse steroids. LFTs have improved  - 8/4: refractory seizures    Interval events:   - o/n with acute refractory seizures 8/4.  placed on EEG with 2 seizures identified by Epilepsy Team.  FosPhenytoin IV increased to 200mg  - More alert today with periods of lethargy  - anasarca, tolerated HD yesterday. remains anuric  - CT abdomen yesterday 8/5 with Right psoas retroperitoneal hematoma. Worsening bilateral pleural effusions.  - Renal ultrasound obtained 8/5 pending read on the size of hematoma    Immunosuppression  Induction:  Thymoglobulin                                        Maintenance:  - Belatacept: 6/23, 7/4, 7/8, 7/18. 8/1, now discontinued  - Now on Sirolimus as of 8/2  - MMF 1g BID  - Pred taper   - Ongoing monitoring for signs of rejection.    Potential Discharge date: pending clinical improvement.     Education:  Medications    Plan of care:  See Below            MEDICATIONS  (STANDING):  amLODIPine   Tablet 10 milliGRAM(s) Oral daily  chlorhexidine 2% Cloths 1 Application(s) Topical <User Schedule>  doxazosin 4 milliGRAM(s) Oral <User Schedule>  epoetin cortney-epbx (RETACRIT) Injectable 33822 Unit(s) SubCutaneous every 7 days  finasteride 5 milliGRAM(s) Oral daily  fosphenytoin IVPB 200 milliGRAM(s) PE IV Intermittent every 12 hours  hydrALAZINE 25 milliGRAM(s) Oral every 8 hours  insulin lispro (ADMELOG) corrective regimen sliding scale   SubCutaneous every 6 hours  levothyroxine Injectable 37.5 MICROGram(s) IV Push at bedtime  melatonin 6 milliGRAM(s) Oral at bedtime  mycophenolate mofetil Suspension 1000 milliGRAM(s) Enteral Tube <User Schedule>  pantoprazole  Injectable 40 milliGRAM(s) IV Push daily  polyethylene glycol 3350 17 Gram(s) Oral daily  predniSONE   Tablet 20 milliGRAM(s) Oral daily  senna Syrup 10 milliLiter(s) Oral at bedtime  sirolimus Solution 1.5 milliGRAM(s) Oral <User Schedule>  trimethoprim  40 mG/sulfamethoxazole 200 mG Suspension 80 milliGRAM(s) Oral daily  valGANciclovir 50 mG/mL Oral Solution 450 milliGRAM(s) Oral <User Schedule>    MEDICATIONS  (PRN):  acetaminophen    Suspension .. 650 milliGRAM(s) Enteral Tube every 6 hours PRN Mild Pain (1 - 3)      PAST MEDICAL & SURGICAL HISTORY:  Hypertension      Diabetes      Dyslipidemia      CAD (Coronary Artery Disease)  with Stents in 06/2009 and 6/2019, s/p off-pump C3L on 8/13/20      Hypothyroidism      CVA (cerebral vascular accident)  12/13/19 with residual bilateral weakness      Anemia      PEG (percutaneous endoscopic gastrostomy) status  removed July 2020      Intubation of airway performed without difficulty  Dec 2019  CVA c/b status epilepticus requiring intubation and PEG in 12/2019-      ESRD on dialysis  M/W/F      Seizures  after CVA, last seizure was last week 4/07/22      History of insertion of stent into coronary artery bypass graft  2009 and 2019      S/P CABG x 3  off pump C3L on 8/13/20          Vital Signs Last 24 Hrs  T(C): 36.7 (06 Aug 2022 11:00), Max: 37.2 (05 Aug 2022 23:00)  T(F): 98.1 (06 Aug 2022 11:00), Max: 99 (05 Aug 2022 23:00)  HR: 49 (06 Aug 2022 10:00) (49 - 56)  BP: 160/74 (06 Aug 2022 10:00) (117/59 - 170/77)  BP(mean): 106 (06 Aug 2022 10:00) (82 - 111)  RR: 11 (06 Aug 2022 10:00) (11 - 26)  SpO2: 99% (06 Aug 2022 10:00) (95% - 100%)    Parameters below as of 06 Aug 2022 07:00  Patient On (Oxygen Delivery Method): room air        I&O's Summary    05 Aug 2022 07:01  -  06 Aug 2022 07:00  --------------------------------------------------------  IN: 960 mL / OUT: 1500 mL / NET: -540 mL    06 Aug 2022 07:01  -  06 Aug 2022 11:43  --------------------------------------------------------  IN: 180 mL / OUT: 0 mL / NET: 180 mL                              9.7    22.92 )-----------( 386      ( 06 Aug 2022 05:37 )             29.5     08-06    131<L>  |  91<L>  |  72<H>  ----------------------------<  160<H>  4.6   |  23  |  3.91<H>    Ca    8.6      06 Aug 2022 05:37  Phos  5.2     08-06  Mg     2.2     08-06    TPro  5.9<L>  /  Alb  2.1<L>  /  TBili  0.3  /  DBili  0.2  /  AST  26  /  ALT  21  /  AlkPhos  308<H>  08-06          Culture - Blood (collected 08-03-22 @ 23:46)  Source: .Blood Blood  Preliminary Report (08-05-22 @ 03:01):    No growth to date.    Culture - Blood (collected 08-03-22 @ 23:46)  Source: .Blood Blood  Preliminary Report (08-05-22 @ 03:01):    No growth to date.                   REVIEW OF SYSTEMS  --------------------------------------------------------------------------------  unable to assess, see above        PHYSICAL EXAM:  Constitutional: awake, weak, not coherent  Eyes: Anicteric  ENMT: nc/at  Respiratory: not tachypneic, non-labored on RA    Cardiovascular: normotensive, regular rate on monitor   Gastrointestinal: Soft, non distended, nontender, RLQ incisional scar healed, some erythema.  anasarca throughout  Genitourinary: anuric on HD   Extremities: SCD's in place and working bilaterally, overall with worsening edema  Vascular: Palpable dp pulses bilaterally  Neurological: AAOx0  Skin: no rashes, ulcerations or lesions  Musculoskeletal: Moving all extremities  Psychiatric: overall calm, but with  periods of agitation

## 2022-08-06 NOTE — PROGRESS NOTE ADULT - ASSESSMENT
66 yr old Male with PMHx ESRD s/p permacath removal 5/10/22 s/p Rt DDRT 4/21/22,  CVA (2019), Afib on apixaban, seizure activity, CAD s/p stents, CABG (2020), HTN, HLD, DM on insulin, gastric/duodenal ulcer, recent hospitalization 4/28/22 -5/10/22 with weakness/anemia found to have perinephric hematoma requiring evacuation and repair of bleeding arterial anastomosis. Curently being treated for UTI with Levaquin Today after developing multiple sz episodes refractory to 5 mg versed IM (EMS) and 2 mg ativan IV in E.D. concerning for status epilepticus requiring intubation for hypoxic respiratory  failure. MICU Consult was called for hypoxic respiratory failure secondary to status epilepticus.  Nephrology consulted for renal failure.     A/P  DDRT on 04/21/22  Course complicated by DGF, was on HD until 4/29/22. Readmitted in May for anemia in the setting of large perinephric hematoma s/p evacuation and repair of arterial anastomosis on 5/02/22. Intra operative biopsy showed no rejection.  ANA was initially sec to  hemodynamic change   stent removal 07/12/22  No hydronephrosis noted on renal US. UA done on 7/25/22 showed LE and pyuria.   allograft US 07/28/22 shows Interval increase in the resistive indices and peak systolic velocities   compared to prior study performed. Short-term imaging follow-up   if clinically indicated.  Grade 1a rejection (biopsy on 7/29) - s/p pulse dose steroids  no DSA.   continue Immunosuppression per transplant team  s/p HD 08/03/22, 8/4/22 and today-  HD per transplant team   transplant team on board   monitor BMP, U/O     HTN   optimal   manage by transplant team    monitor BP closely

## 2022-08-06 NOTE — PROGRESS NOTE ADULT - ASSESSMENT
66M with h/o DM type II, HTN, CAD s/p CABG in 2020, Afib on Eliquis, CVA in 2019 due to Afib, Seizure d/o (last episode was on 4/8/22), h/o GI bleed in 2/2022 EGD with duodenal ulcer and ESRD on HD s/p R DDRT from DCD donor on 4/21/22 complicated by DGF requiring HD until 4/29/22 who presented with status epilepticus in setting of UTI/antibiotics and sub-therapeutic valproic acid level. Transferred to SICU on 7/25 with signs of sepsis, now with refractory seizures    Seizure Disorder:  - refractory seizures o/n.  Neuro/Epilepsy Teams following.  Follow recommendations closely. continue EEG. Avoid Fycompa 2/2 agitation in past  - prior MRI head 6/27 with less concerns for PRES, likely ischemic changes  - o/n CTH 8/3 with no acute findings  - replete lytes aggressively    R DCD DDRT 4/21/22 now with 2A ACR  - 2A ACR: s/p pulse steroids, now on 20mg QD will keep   - will continue HD daily per renal given persistent fluid overload and anuria  - graft doppler stable, stable perinephric collection pending read from yesterday  - S/P removal of ureteral stent on 7/12  - Continue Doxazosin/Proscar for BPH  - OOB with RN/PT  - DSA negative   - ADAT  - follow up CT C/A/P and renal ultrasound today    Immunosuppression:   - Belatacept: 6/23, 7/4, 7/8. Re-loaded 7/8,  as there was a gap between doses 2/2 seizures. 7/18,  last dose 8/1  - on Sirolimus as of 8/2 per level (3.4 increase to 2mg today)  - continue Pred 5  - PPx: Valcyte/ bactrim on hold, will restart as able    DM  - steroid induced hyperglycemia, improving with Endocrine management    AFib/R IJ DVT   - Eliquis with ~40% less efficacy while on fosphenytoin.    - Lovenox held for ANA and transaminitis   - rate controlled  - will discuss A/C plans daily    HTN:  - Nifedipine/Cardura/Hydralazline  - off Coreg due to bradycardia    DISPO  - continue SICU care

## 2022-08-07 NOTE — PROGRESS NOTE ADULT - ASSESSMENT
66 yr old Male with PMHx ESRD s/p permacath removal 5/10/22 s/p Rt DDRT 4/21/22,  CVA (2019), Afib on apixaban, seizure activity, CAD s/p stents, CABG (2020), HTN, HLD, DM on insulin, gastric/duodenal ulcer, recent hospitalization 4/28/22 -5/10/22 with weakness/anemia found to have perinephric hematoma requiring evacuation and repair of bleeding arterial anastomosis. Curently being treated for UTI with Levaquin Today after developing multiple sz episodes refractory to 5 mg versed IM (EMS) and 2 mg ativan IV in E.D. concerning for status epilepticus requiring intubation for hypoxic respiratory  failure. MICU Consult was called for hypoxic respiratory failure secondary to status epilepticus.  Nephrology consulted for renal failure.     A/P  DDRT on 04/21/22  Course complicated by DGF, was on HD until 4/29/22. Readmitted in May for anemia in the setting of large perinephric hematoma s/p evacuation and repair of arterial anastomosis on 5/02/22. Intra operative biopsy showed no rejection.  ANA was initially sec to  hemodynamic change   stent removal 07/12/22  No hydronephrosis noted on renal US. UA done on 7/25/22 showed LE and pyuria.   allograft US 07/28/22 shows Interval increase in the resistive indices and peak systolic velocities   compared to prior study performed. Short-term imaging follow-up   if clinically indicated.  Grade 1a rejection (biopsy on 7/29) - s/p pulse dose steroids  no DSA.   continue Immunosuppression per transplant team  s/p HD 08/03/22, 8/4/22 and 8/6-  HD per transplant team   transplant team on board   monitor BMP, U/O     HTN   optimal   manage by transplant team    monitor BP closely

## 2022-08-07 NOTE — PROGRESS NOTE ADULT - ASSESSMENT
Mr. Castelan is a 65 y/o M with a PMH of T2DM, ESRD ( HD via R. AV fistula since 2019), HTN, CAD s/p CABG 2020, A. Fib ( On Elqiuis), CVA ( 2019 2/2 A. Fib), Seizure d/o after CVA in 2019, GIB ( 02/2022) 2/2 duodenal ulcer, and ESRD s/p DDRT 4/21/22 c/b DGF requiring HD & large perinephric hematoma 5/22 s/p evacuation w/ revision of arterial anastomosis. Pt presented from rehab on 6/10/22 2/2 status epilepticus requiring intubation for airway protection with a hospital course c/b large right pleural effusion s/p thoracentesis c/b hemothorax while being anticoagulated & requiring intubation for airway protectio. Pt transferred to SICU s/p VATS with a second chest tube placed. Pt transferred to floor 7/10 and readmitted to SICU 7/25 for AMS, found to have ESBL klebsiella UTI, ANA, with renal rejection, and renal failure requiring HD. Course further c/b 3 focal seizures s/p EEG and antiepileptic therapy with Fosphenytoin Pt remains in SICU for further hemodynamic monitoring and management.     Neuro: encephalopathy improved, hx of seizure  - c/w Fosphenytoin 200mg IV BID  - S/p Fycompa, previously successful antiepileptic therapy with notable psych changes   - Multimodal pain control w/Tylenol  - Melatonin with Protected sleep to improve delirium  - CT head (08/04): Negative for  acute hemorrhage  - Neurology following, recs appreciated     Resp: No acute issues  - Maintain SpO2 >92%   - Out of bed to chair, ambulate as tolerated, and incentive spirometry to prevent atelectasis    CVS: hx of HTN, s/p CABG, AF on Eliquis  - Maintain MAP > 65  - Continue Hydralazine 25mg q8h and Amlodipine 10mg qd     GI: transaminitis  - KO feeding tube in place continue Nepro TF @ 20cc   - Protonix 40mg IV daily for ppx   - Bowel regimen with Miralax,  senna     Renal: DDRT 4/21 cb DGF, now c/b acute rejection s/p IR renal bx 7/29  - Pt Anuric, s/p HD 2L removed ( 8/6)   - Doxazosin + Proscar  - d/c Na bicarb and start Na chloride   - Continue immunosuppression with sirolimus, cellcept, prednisone 20mg     Heme:  - Monitor CBC and coags  - SCDs for mechanical VTe ppx   -lovenox held     ID: ESBL klebsiella UTI (7/15 + UCx, 7/25 +UCx)  - Monitor WBC, temperature, and procalcitonin  - Blood cx negative 7/25, 7/27, 7/30  - F/u Repeat blood cx ( 08/04) with onset of seziures   - D/c Ertapenem and Fluconazole due to lower seizure threshold  -c/w Vanco by level due to leukocytosis with surrounding erythema of Right groin incision per Transplant ID       Endo: hx of T2DM, Hypothyroidism,   - Hypoglyemic episode to 50 ( 8/4) d/c Lantus and Premeal, continue low dose ISS   - Continue Home Synthroid    Code status: Full Code   Dispo: SICU

## 2022-08-07 NOTE — PROGRESS NOTE ADULT - SUBJECTIVE AND OBJECTIVE BOX
[facility] DIVISION OF KIDNEY DISEASES AND HYPERTENSION -- FOLLOW UP NOTE  --------------------------------------------------------------------------------  Chief Complaint:    24 hour events/subjective:  Patient was seen and examined at bedside  WBC improved to 19K today     PAST HISTORY  --------------------------------------------------------------------------------  No significant changes to PMH, PSH, FHx, SHx, unless otherwise noted    ALLERGIES & MEDICATIONS  --------------------------------------------------------------------------------  Allergies    No Known Allergies    Intolerances      Standing Inpatient Medications  amLODIPine   Tablet 10 milliGRAM(s) Oral daily  chlorhexidine 2% Cloths 1 Application(s) Topical <User Schedule>  doxazosin 4 milliGRAM(s) Oral <User Schedule>  epoetin cortney-epbx (RETACRIT) Injectable 88363 Unit(s) SubCutaneous every 7 days  finasteride 5 milliGRAM(s) Oral daily  fosphenytoin IVPB 200 milliGRAM(s) PE IV Intermittent every 12 hours  hydrALAZINE 25 milliGRAM(s) Oral every 8 hours  insulin lispro (ADMELOG) corrective regimen sliding scale   SubCutaneous every 6 hours  levothyroxine Injectable 37.5 MICROGram(s) IV Push at bedtime  melatonin 6 milliGRAM(s) Oral at bedtime  mycophenolate mofetil Suspension 1000 milliGRAM(s) Enteral Tube <User Schedule>  pantoprazole  Injectable 40 milliGRAM(s) IV Push daily  polyethylene glycol 3350 17 Gram(s) Oral daily  predniSONE   Tablet 20 milliGRAM(s) Oral daily  senna Syrup 10 milliLiter(s) Oral at bedtime  sirolimus Solution 2 milliGRAM(s) Oral <User Schedule>  trimethoprim  40 mG/sulfamethoxazole 200 mG Suspension 80 milliGRAM(s) Oral daily  valGANciclovir 50 mG/mL Oral Solution 450 milliGRAM(s) Oral <User Schedule>    PRN Inpatient Medications  acetaminophen    Suspension .. 650 milliGRAM(s) Enteral Tube every 6 hours PRN      REVIEW OF SYSTEMS- unable to obtain       VITALS/PHYSICAL EXAM  --------------------------------------------------------------------------------  T(C): 37.1 (08-07-22 @ 03:00), Max: 37.1 (08-06-22 @ 20:00)  HR: 58 (08-07-22 @ 06:00) (48 - 551)  BP: 140/65 (08-07-22 @ 06:00) (115/717 - 170/77)  RR: 14 (08-07-22 @ 06:00) (10 - 30)  SpO2: 98% (08-07-22 @ 06:00) (96% - 100%)  Wt(kg): --        08-06-22 @ 07:01  -  08-07-22 @ 07:00  --------------------------------------------------------  IN: 1700 mL / OUT: 2800 mL / NET: -1100 mL      Physical Exam:             Gen: more awake and responsive today   	Pulm: CTA B/L  	CV: RRR, S1S2; no rub  	Abd: soft, slightly distended                  Transplant: + erythema and induration overlying transplant site.+ Tender and warm.   	: ++scrotal swelling             Extremities + b/l LE and UE edema  	Neuro: more wake today. AAOX 1-2  	Skin: Warm, without rashes      LABS/STUDIES  --------------------------------------------------------------------------------              9.3    19.38 >-----------<  365      [08-07-22 @ 06:05]              28.9     134  |  93  |  58  ----------------------------<  183      [08-07-22 @ 06:05]  4.3   |  22  |  3.68        Ca     8.6     [08-07-22 @ 06:05]      Mg     2.2     [08-07-22 @ 06:05]      Phos  4.8     [08-07-22 @ 06:05]    TPro  5.9  /  Alb  2.1  /  TBili  0.2  /  DBili  0.1  /  AST  20  /  ALT  19  /  AlkPhos  286  [08-07-22 @ 06:05]    PT/INR: PT 14.5 , INR 1.26       [08-07-22 @ 06:05]  PTT: 30.3       [08-07-22 @ 06:05]      Creatinine Trend:  SCr 3.68 [08-07 @ 06:05]  SCr 3.91 [08-06 @ 05:37]  SCr 3.29 [08-05 @ 17:52]  SCr 4.17 [08-05 @ 04:17]  SCr 3.97 [08-04 @ 22:25]        Sirolimus (Rapamycin) Level, Serum: 3.4 ng/mL (08-06 @ 05:37)  Sirolimus (Rapamycin) Level, Serum: 3.4 ng/mL (08-05 @ 04:17)  Sirolimus (Rapamycin) Level, Serum: 5.0 ng/mL (08-03 @ 22:24)  Sirolimus (Rapamycin) Level, Serum: 6.1 ng/mL (08-02 @ 23:18)        Urinalysis - [07-25-22 @ 18:28]      Color Yellow / Appearance Slightly Turbid / SG 1.020 / pH 5.5      Gluc Negative / Ketone Negative  / Bili Negative / Urobili Negative       Blood Small / Protein 100 / Leuk Est Large / Nitrite Negative      RBC 2 / WBC 40 / Hyaline 5 / Gran  / Sq Epi  / Non Sq Epi 0 / Bacteria Many      Iron 17, TIBC 171, %sat 10      [05-02-22 @ 13:03]  Ferritin 6336      [07-27-22 @ 13:00]  PTH -- (Ca 9.2)      [02-05-22 @ 10:13]   294  HbA1c 6.0      [02-21-20 @ 08:53]  TSH 4.69      [07-06-22 @ 18:36]  Lipid: chol --, , HDL --, LDL --      [07-27-22 @ 13:00]    HBsAg Nonreact      [07-25-22 @ 11:51]  HCV 0.18, Nonreact      [07-25-22 @ 11:51]

## 2022-08-07 NOTE — PROGRESS NOTE ADULT - SUBJECTIVE AND OBJECTIVE BOX
[facility]   MD NINA MASON PA MIJUNG SHIN NP     TEL:  OFFICE: [phone]    From 5pm-7am Answering Service [phone]    -- RENAL FOLLOW UP NOTE ---Date of Service 08-07-22 @ 12:58    Patient is a 66y old  Male who presents with a chief complaint of Status Epilepticus (07 Aug 2022 10:21)      Patient seen and examined at bedside. No chest pain/sob    VITALS:  T(F): 98.1 (08-07-22 @ 11:00), Max: 98.8 (08-06-22 @ 20:00)  HR: 53 (08-07-22 @ 12:00)  BP: 154/68 (08-07-22 @ 12:00)  RR: 15 (08-07-22 @ 12:00)  SpO2: 99% (08-07-22 @ 12:00)  Wt(kg): --    08-06 @ 07:01  -  08-07 @ 07:00  --------------------------------------------------------  IN: 1700 mL / OUT: 2800 mL / NET: -1100 mL    08-07 @ 07:01  -  08-07 @ 12:58  --------------------------------------------------------  IN: 100 mL / OUT: 0 mL / NET: 100 mL          PHYSICAL EXAM:  Constitutional: NAD  Neck: No JVD  Respiratory: CTAB, no wheezes, rales or rhonchi  Cardiovascular: S1, S2, RRR  Gastrointestinal: BS+, soft, NT/ND, NG tube(+)  : sacrotal swelling 2+  Extremities: B/L LE edema 2+  HD access: HD [facility] Medications:   MEDICATIONS  (STANDING):  amLODIPine   Tablet 10 milliGRAM(s) Oral daily  chlorhexidine 2% Cloths 1 Application(s) Topical <User Schedule>  doxazosin 4 milliGRAM(s) Oral <User Schedule>  epoetin cortney-epbx (RETACRIT) Injectable 57673 Unit(s) SubCutaneous every 7 days  finasteride 5 milliGRAM(s) Oral daily  fosphenytoin IVPB 200 milliGRAM(s) PE IV Intermittent every 12 hours  hydrALAZINE 25 milliGRAM(s) Oral every 8 hours  insulin lispro (ADMELOG) corrective regimen sliding scale   SubCutaneous every 6 hours  levothyroxine Injectable 37.5 MICROGram(s) IV Push at bedtime  melatonin 6 milliGRAM(s) Oral at bedtime  mycophenolate mofetil Suspension 250 milliGRAM(s) Enteral Tube <User Schedule>  pantoprazole  Injectable 40 milliGRAM(s) IV Push daily  polyethylene glycol 3350 17 Gram(s) Oral daily  predniSONE   Tablet 20 milliGRAM(s) Oral daily  senna Syrup 10 milliLiter(s) Oral at bedtime  sirolimus Solution 2 milliGRAM(s) Oral <User Schedule>  trimethoprim  40 mG/sulfamethoxazole 200 mG Suspension 80 milliGRAM(s) Oral daily  valGANciclovir 50 mG/mL Oral Solution 450 milliGRAM(s) Oral <User Schedule>      LABS:  08-07    134<L>  |  93<L>  |  58<H>  ----------------------------<  183<H>  4.3   |  22  |  3.68<H>    Ca    8.6      07 Aug 2022 06:05  Phos  4.8     08-07  Mg     2.2     08-07    TPro  5.9<L>  /  Alb  2.1<L>  /  TBili  0.2  /  DBili  0.1  /  AST  20  /  ALT  19  /  AlkPhos  286<H>  08-07    Creatinine Trend: 3.68 <--, 3.91 <--, 3.29 <--, 4.17 <--, 3.97 <--, 3.78 <--, 3.37 <--, 4.40 <--, 4.26 <--, 4.09 <--, 3.69 <--, 3.59 <--    Albumin, Serum: 2.1 g/dL (08-07 @ 06:05)  Phosphorus Level, Serum: 4.8 mg/dL (08-07 @ 06:05)                              9.3    19.38 )-----------( 365      ( 07 Aug 2022 06:05 )             28.9     Urine Studies:  Urinalysis - [07-25-22 @ 18:28]      Color Yellow / Appearance Slightly Turbid / SG 1.020 / pH 5.5      Gluc Negative / Ketone Negative  / Bili Negative / Urobili Negative       Blood Small / Protein 100 / Leuk Est Large / Nitrite Negative      RBC 2 / WBC 40 / Hyaline 5 / Gran  / Sq Epi  / Non Sq Epi 0 / Bacteria Many      Iron 17, TIBC 171, %sat 10      [05-02-22 @ 13:03]  Ferritin 6336      [07-27-22 @ 13:00]  PTH -- (Ca 9.2)      [02-05-22 @ 10:13]   294  HbA1c 6.0      [02-21-20 @ 08:53]  TSH 4.69      [07-06-22 @ 18:36]  Lipid: chol --, , HDL --, LDL --      [07-27-22 @ 13:00]    HBsAg Nonreact      [07-25-22 @ 11:51]  HCV 0.18, Nonreact      [07-25-22 @ 11:51]      RADIOLOGY & ADDITIONAL STUDIES:

## 2022-08-07 NOTE — PROGRESS NOTE ADULT - ATTENDING COMMENTS
Pt is a 66 year old male with a medical history signficant for CAD (s/p CABG), AF (on Eliquis c/b CVA c/b seizures), HTN, HLD, DM type II, hypothyroidism, GI bleed due to a duodenal ulcer, and ESRD (s/p DDRT 4/21/22 c/b DGF requiring HD) with large perinephric hematoma (s/p evacuation w/ revision of arterial anastomosis) who presented in status epilepticus. Pt was intubated for airway protection. Pt transferred to floor 7/10 and readmitted to SICU 7/25 for AMS due to an ESBL klebsiella UTI, ANA/renal rejection, and renal failure requiring HD. No further seizures overnight. A right psoas hematoma was discovered on CT and lovenox was discontinued.    Seizure disorder  Continue fosphenytoin and multimodal pain control  Melatonin + protected sleep to improve delirium    Atelectasis  Continue ISP    HTN/CAD, H/o CABG  AFib off AC  Continue nifedipine  Continue hydralazine  Continue coreg    Improving transaminitis  Continue to monitor  Continue tube feeds   Swallowing evaluation    S/p DDRT 4/21 cb DGF, now cb acute rejection, s/p IR renal bx 7/29  Continue prednisone 20mg  Monitor I&Os and electrolytes  HD performed yesterday    Off A/C for psoas hematoma  Suggest SQ heparin for DVT proph    SBL klebsiella UTI (7/15 + UCx, 7/25 +UCx)  Course of ABX complete  Cont proph antimicrobials per transplant  Immunosuppresion per transplant nephro     DM2  Continue SSRI    For permcath change tomorrow

## 2022-08-07 NOTE — PROGRESS NOTE ADULT - PROBLEM SELECTOR PLAN 3
On LT4 50 mcg daily  Since pt on TFs now consider switching to IV dosing in order to ensure med absorption. Synthroid 37.5mcg IV   Can also hold TF 1 hour before and 1 hour after med is given vit NGT. Please make sure LT4 is given on an empty stomach at least one hour apart from other meds and food to ensure med absorption. DO NOT GIVE WITH VITAMINS/ANTACIDS  - monitor TFTs outpatient in 4 weeks after dc with Dr. Lincoln.    discussed w/pt's son and ICU team  Can be reached via TEAMS/pager: [phone]   office:  [phone] (M-F 9a-5pm)               [phone] (nights/weekends)   [email or web address] password [email or web address]

## 2022-08-07 NOTE — PROGRESS NOTE ADULT - ASSESSMENT
66 yr old M with Type 2 DM> unknown control due to skewed A1C 6.6% secondary to chronic anemia and s/p blood tx. On basal/bolus insulin therapy PTA. DM c/b ESRD s/p DDRT on 3/21, CVA, CAD s/p CABG, Afib on apixaban, seizure activity, HTN, HLD, h/o GI bleed in 2/2022 EGD with duodenal ulcer, discharged to [facility] rehab center after prior hospitalization, now re-admitted from [facility] for seizures c/f status epilepticus in setting of UTI. endocrine consulted for steroid induced hyperglycemia, remains home dose prednisone 5mg. Prolonged hospital course w/ ICU stay, previously intubated/extubated, previous seizures. S/p ureteral stent removal 7/12/22 pt is now on steroid pulse doses due to rejection with anuria and worsening creat> steroids been tapered down daily. On continuous tube feeds w/BG values above goal now w/diet advanced to PO diet after seeing patient today.

## 2022-08-07 NOTE — PROGRESS NOTE ADULT - SUBJECTIVE AND OBJECTIVE BOX
Transplant Surgery - Multidisciplinary Progress Note  --------------------------------------------------------------  DDRT     4/21/2022             Present:  Patient seen with multidisciplinary team including Transplant Surgeon: Dr. David,  Nephrologist: Dr. Mary Lomeli,  NP [facility]. Disciplines not in attendance will be notified of the plan.      66M with PMH: DM type II, HTN, CAD s/p CABG in 2020, AFib on Eliquis, CVA in 2019 due to Afib, Seizure d/o following CVA, last episode was on 4/8/22, h/o GIB in 2/2022 EGD with duodenal ulcer.  ESRD due to DM was on HD since 2019.     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  Donor was 58 , KDPI 82%, DCD, single vessels and ureter, HLA mismatch 1, 2, 2. No DSA, cPRA 0%. 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 Re-admitted in May for anemia in the setting of large perinephric hematoma s/p evacuation and repair of arterial anastomosis on 5/2/22. Intra operative biopsy showed no rejection, Creatinine ranging ~2mg/dL.    In Rehab:  He was recently dx with klebsiella UTI with Ertapenem - then switched to Levaquin    He also had parainfluenza pneumonia. Was at rehab progressing well.      Hospital course:  - 6/10: transferred from rehab facility for seizure status epilepticus. Intubated for respiratory protection and transferred to MICU.  EEG showed no seizure activity. Neuro consulted.   - 6/11: Extubated. Found to have R pleural effusion. s/p Thoracentesis 1.3L. Eliquis changed to heparin drip.  Post procedure drop in H&H. 5u PRBC, 2 u FFP.   - 6/11 evening: Transferred to SICU from MICU for further care in setting of hypoxia, significant R pleural effusion, anemia and hemodynamic instability  - 6/11 Intubated at 9pm and sedated on Precedex.  CT placed, 600ml blood drained.  1L total overnight, started pressors  - 6/11 Received Protamine for PTT >100 (was on Heparin drip started for AF), 2 u FFP and 5u PRBC total  - 6/13 s/p VATS 2.2L old blood removed; second chest tube placed  - 6/18-19: chest tubes removed  - 6/21: ?PRES vs. chronic ischemic changes on MRI, off Envarsus, switched to Belatacept 6/23 with good graft function  - 6/25: Tx to SICU for refractory seizures, improved with IV antiepileptic regimen  - 7/10: Downgraded from SICU to floor.   - 7/11: OR-->URETERAL STENT REMOVED  - 7/15: ESBL Kleb UTI (50-90K), completed Ertapenem x 3 Days  - 7/25: Tx to SICU for Sepsis/ elevated LFTS  - 7/27: Shiley placed for HD  - 7/28: ongoing fevers -> started Ertapenem/Fluc  - 7/29: HD restarted + 1U PRBC.  IR bx with 2A rejection, started pulse steroids. LFTs have improved  - 8/4: refractory seizures    Interval events:   -No seizures seen on EEG yesterday, no obvious seizures overnight, official EEG report pending for today. On Fosphenytoin 200 BID  -s/p HD yesterday. Planning to change permcath 2/2 poor function. IR consulted.  -This AM, is very lethargic and slow to respond but is AOX3, but unable to engage in a full conversation and speech is slurred.   -R sided erythema around wound significantly improved and has not expanded outside the marked area.       Immunosuppression  Induction:  Thymoglobulin                                        Maintenance:  - Belatacept: 6/23, 7/4, 7/8, 7/18. 8/1, now discontinued  - Now on Sirolimus as of 8/2  - MMF 1g BID  - Pred taper   - Ongoing monitoring for signs of rejection.    Potential Discharge date: pending clinical improvement.     Education:  Medications    Plan of care:  See Below      MEDICATIONS  (STANDING):  amLODIPine   Tablet 10 milliGRAM(s) Oral daily  chlorhexidine 2% Cloths 1 Application(s) Topical <User Schedule>  doxazosin 4 milliGRAM(s) Oral <User Schedule>  epoetin cortney-epbx (RETACRIT) Injectable 55162 Unit(s) SubCutaneous every 7 days  finasteride 5 milliGRAM(s) Oral daily  fosphenytoin IVPB 200 milliGRAM(s) PE IV Intermittent every 12 hours  hydrALAZINE 25 milliGRAM(s) Oral every 8 hours  insulin lispro (ADMELOG) corrective regimen sliding scale   SubCutaneous every 6 hours  levothyroxine Injectable 37.5 MICROGram(s) IV Push at bedtime  melatonin 6 milliGRAM(s) Oral at bedtime  mycophenolate mofetil Suspension 250 milliGRAM(s) Enteral Tube <User Schedule>  pantoprazole  Injectable 40 milliGRAM(s) IV Push daily  polyethylene glycol 3350 17 Gram(s) Oral daily  predniSONE   Tablet 20 milliGRAM(s) Oral daily  senna Syrup 10 milliLiter(s) Oral at bedtime  sirolimus Solution 2 milliGRAM(s) Oral <User Schedule>  trimethoprim  40 mG/sulfamethoxazole 200 mG Suspension 80 milliGRAM(s) Oral daily  valGANciclovir 50 mG/mL Oral Solution 450 milliGRAM(s) Oral <User Schedule>    MEDICATIONS  (PRN):  acetaminophen    Suspension .. 650 milliGRAM(s) Enteral Tube every 6 hours PRN Mild Pain (1 - 3)      PAST MEDICAL & SURGICAL HISTORY:  Hypertension      Diabetes      Dyslipidemia      CAD (Coronary Artery Disease)  with Stents in 06/2009 and 6/2019, s/p off-pump C3L on 8/13/20      Hypothyroidism      CVA (cerebral vascular accident)  12/13/19 with residual bilateral weakness      Anemia      PEG (percutaneous endoscopic gastrostomy) status  removed July 2020      Intubation of airway performed without difficulty  Dec 2019  CVA c/b status epilepticus requiring intubation and PEG in 12/2019-      ESRD on dialysis  M/W/F      Seizures  after CVA, last seizure was last week 4/07/22      History of insertion of stent into coronary artery bypass graft  2009 and 2019      S/P CABG x 3  off pump C3L on 8/13/20          Vital Signs Last 24 Hrs  T(C): 36.8 (07 Aug 2022 07:00), Max: 37.1 (06 Aug 2022 20:00)  T(F): 98.3 (07 Aug 2022 07:00), Max: 98.8 (06 Aug 2022 20:00)  HR: 56 (07 Aug 2022 09:00) (48 - 551)  BP: 144/65 (07 Aug 2022 09:00) (115/717 - 170/68)  BP(mean): 94 (07 Aug 2022 09:00) (84 - 102)  RR: 14 (07 Aug 2022 09:00) (10 - 30)  SpO2: 97% (07 Aug 2022 09:00) (96% - 100%)    Parameters below as of 06 Aug 2022 15:00  Patient On (Oxygen Delivery Method): room air        I&O's Summary    06 Aug 2022 07:01  -  07 Aug 2022 07:00  --------------------------------------------------------  IN: 1700 mL / OUT: 2800 mL / NET: -1100 mL    07 Aug 2022 07:01  -  07 Aug 2022 10:27  --------------------------------------------------------  IN: 40 mL / OUT: 0 mL / NET: 40 mL                              9.3    19.38 )-----------( 365      ( 07 Aug 2022 06:05 )             28.9     08-07    134<L>  |  93<L>  |  58<H>  ----------------------------<  183<H>  4.3   |  22  |  3.68<H>    Ca    8.6      07 Aug 2022 06:05  Phos  4.8     08-07  Mg     2.2     08-07    TPro  5.9<L>  /  Alb  2.1<L>  /  TBili  0.2  /  DBili  0.1  /  AST  20  /  ALT  19  /  AlkPhos  286<H>  08-07          Culture - Blood (collected 08-03-22 @ 23:46)  Source: .Blood Blood  Preliminary Report (08-05-22 @ 03:01):    No growth to date.    Culture - Blood (collected 08-03-22 @ 23:46)  Source: .Blood Blood  Preliminary Report (08-05-22 @ 03:01):    No growth to date            REVIEW OF SYSTEMS  --------------------------------------------------------------------------------  unable to obtain full accurate ROS, but denies CP, SOB.    PHYSICAL EXAM:  Constitutional: awake, weak, not coherent  Eyes: Anicteric  ENMT: nc/at  Respiratory: not tachypneic, non-labored on RA    Cardiovascular: normotensive, regular rate on monitor   Gastrointestinal: Soft, non distended, nontender, RLQ incisional scar healed, some erythema.  anasarca throughout  Genitourinary: anuric on HD   Extremities: SCD's in place and working bilaterally, overall with worsening edema  Vascular: Palpable dp pulses bilaterally  Neurological: AAOx3  Skin: no rashes, ulcerations or lesions  Musculoskeletal: Moving all extremities  Psychiatric: overall calm, but with  periods of agitation     Transplant Surgery - Multidisciplinary Progress Note  --------------------------------------------------------------  DDRT     4/21/2022             Present:  Patient seen with multidisciplinary team including Transplant Surgeon: Dr. David,  Nephrologist: Dr. Mary Lomeli,  NP [facility]. Disciplines not in attendance will be notified of the plan.      66M with PMH: DM type II, HTN, CAD s/p CABG in 2020, AFib on Eliquis, CVA in 2019 due to Afib, Seizure d/o following CVA, last episode was on 4/8/22, h/o GIB in 2/2022 EGD with duodenal ulcer.  ESRD due to DM was on HD since 2019.     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  Donor was 58 , KDPI 82%, DCD, single vessels and ureter, HLA mismatch 1, 2, 2. No DSA, cPRA 0%. 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 Re-admitted in May for anemia in the setting of large perinephric hematoma s/p evacuation and repair of arterial anastomosis on 5/2/22. Intra operative biopsy showed no rejection, Creatinine ranging ~2mg/dL.    In Rehab:  He was recently dx with klebsiella UTI with Ertapenem - then switched to Levaquin    He also had parainfluenza pneumonia. Was at rehab progressing well.      Hospital course:  - 6/10: transferred from rehab facility for seizure status epilepticus. Intubated for respiratory protection and transferred to MICU.  EEG showed no seizure activity. Neuro consulted.   - 6/11: Extubated. Found to have R pleural effusion. s/p Thoracentesis 1.3L. Eliquis changed to heparin drip.  Post procedure drop in H&H. 5u PRBC, 2 u FFP.   - 6/11 evening: Transferred to SICU from MICU for further care in setting of hypoxia, significant R pleural effusion, anemia and hemodynamic instability  - 6/11 Intubated at 9pm and sedated on Precedex.  CT placed, 600ml blood drained.  1L total overnight, started pressors  - 6/11 Received Protamine for PTT >100 (was on Heparin drip started for AF), 2 u FFP and 5u PRBC total  - 6/13 s/p VATS 2.2L old blood removed; second chest tube placed  - 6/18-19: chest tubes removed  - 6/21: ?PRES vs. chronic ischemic changes on MRI, off Envarsus, switched to Belatacept 6/23 with good graft function  - 6/25: Tx to SICU for refractory seizures, improved with IV antiepileptic regimen  - 7/10: Downgraded from SICU to floor.   - 7/11: OR-->URETERAL STENT REMOVED  - 7/15: ESBL Kleb UTI (50-90K), completed Ertapenem x 3 Days  - 7/25: Tx to SICU for Sepsis/ elevated LFTS  - 7/27: Shiley placed for HD  - 7/28: ongoing fevers -> started Ertapenem/Fluc  - 7/29: HD restarted + 1U PRBC.  IR bx with 2A rejection, started pulse steroids. LFTs have improved  - 8/4: refractory seizures    Interval events:   -No seizures seen on EEG yesterday, no obvious seizures overnight, official EEG report pending for today. On Fosphenytoin 200 BID  -s/p HD yesterday. Planning to change permcath 2/2 poor function. IR consulted.  -This AM, is very lethargic and slow to respond but is AOX3, but unable to engage in a full conversation and speech is slurred.   -R sided erythema around wound significantly improved and has not expanded outside the marked area.       Immunosuppression  Induction:  Thymoglobulin                                        Maintenance:  - Belatacept: 6/23, 7/4, 7/8, 7/18. 8/1, now discontinued  - Now on Sirolimus as of 8/2  - Dec cellcept to 250 BID.   - Pred taper   - Ongoing monitoring for signs of rejection.    Potential Discharge date: pending clinical improvement.     Education:  Medications    Plan of care:  See Below      MEDICATIONS  (STANDING):  amLODIPine   Tablet 10 milliGRAM(s) Oral daily  chlorhexidine 2% Cloths 1 Application(s) Topical <User Schedule>  doxazosin 4 milliGRAM(s) Oral <User Schedule>  epoetin cortney-epbx (RETACRIT) Injectable 93585 Unit(s) SubCutaneous every 7 days  finasteride 5 milliGRAM(s) Oral daily  fosphenytoin IVPB 200 milliGRAM(s) PE IV Intermittent every 12 hours  hydrALAZINE 25 milliGRAM(s) Oral every 8 hours  insulin lispro (ADMELOG) corrective regimen sliding scale   SubCutaneous every 6 hours  levothyroxine Injectable 37.5 MICROGram(s) IV Push at bedtime  melatonin 6 milliGRAM(s) Oral at bedtime  mycophenolate mofetil Suspension 250 milliGRAM(s) Enteral Tube <User Schedule>  pantoprazole  Injectable 40 milliGRAM(s) IV Push daily  polyethylene glycol 3350 17 Gram(s) Oral daily  predniSONE   Tablet 20 milliGRAM(s) Oral daily  senna Syrup 10 milliLiter(s) Oral at bedtime  sirolimus Solution 2 milliGRAM(s) Oral <User Schedule>  trimethoprim  40 mG/sulfamethoxazole 200 mG Suspension 80 milliGRAM(s) Oral daily  valGANciclovir 50 mG/mL Oral Solution 450 milliGRAM(s) Oral <User Schedule>    MEDICATIONS  (PRN):  acetaminophen    Suspension .. 650 milliGRAM(s) Enteral Tube every 6 hours PRN Mild Pain (1 - 3)      PAST MEDICAL & SURGICAL HISTORY:  Hypertension      Diabetes      Dyslipidemia      CAD (Coronary Artery Disease)  with Stents in 06/2009 and 6/2019, s/p off-pump C3L on 8/13/20      Hypothyroidism      CVA (cerebral vascular accident)  12/13/19 with residual bilateral weakness      Anemia      PEG (percutaneous endoscopic gastrostomy) status  removed July 2020      Intubation of airway performed without difficulty  Dec 2019  CVA c/b status epilepticus requiring intubation and PEG in 12/2019-      ESRD on dialysis  M/W/F      Seizures  after CVA, last seizure was last week 4/07/22      History of insertion of stent into coronary artery bypass graft  2009 and 2019      S/P CABG x 3  off pump C3L on 8/13/20          Vital Signs Last 24 Hrs  T(C): 36.8 (07 Aug 2022 07:00), Max: 37.1 (06 Aug 2022 20:00)  T(F): 98.3 (07 Aug 2022 07:00), Max: 98.8 (06 Aug 2022 20:00)  HR: 56 (07 Aug 2022 09:00) (48 - 551)  BP: 144/65 (07 Aug 2022 09:00) (115/717 - 170/68)  BP(mean): 94 (07 Aug 2022 09:00) (84 - 102)  RR: 14 (07 Aug 2022 09:00) (10 - 30)  SpO2: 97% (07 Aug 2022 09:00) (96% - 100%)    Parameters below as of 06 Aug 2022 15:00  Patient On (Oxygen Delivery Method): room air        I&O's Summary    06 Aug 2022 07:01  -  07 Aug 2022 07:00  --------------------------------------------------------  IN: 1700 mL / OUT: 2800 mL / NET: -1100 mL    07 Aug 2022 07:01  -  07 Aug 2022 10:27  --------------------------------------------------------  IN: 40 mL / OUT: 0 mL / NET: 40 mL                              9.3    19.38 )-----------( 365      ( 07 Aug 2022 06:05 )             28.9     08-07    134<L>  |  93<L>  |  58<H>  ----------------------------<  183<H>  4.3   |  22  |  3.68<H>    Ca    8.6      07 Aug 2022 06:05  Phos  4.8     08-07  Mg     2.2     08-07    TPro  5.9<L>  /  Alb  2.1<L>  /  TBili  0.2  /  DBili  0.1  /  AST  20  /  ALT  19  /  AlkPhos  286<H>  08-07          Culture - Blood (collected 08-03-22 @ 23:46)  Source: .Blood Blood  Preliminary Report (08-05-22 @ 03:01):    No growth to date.    Culture - Blood (collected 08-03-22 @ 23:46)  Source: .Blood Blood  Preliminary Report (08-05-22 @ 03:01):    No growth to date            REVIEW OF SYSTEMS  --------------------------------------------------------------------------------  unable to obtain full accurate ROS, but denies CP, SOB.    PHYSICAL EXAM:  Constitutional: awake, weak, not coherent  Eyes: Anicteric  ENMT: nc/at  Respiratory: not tachypneic, non-labored on RA    Cardiovascular: normotensive, regular rate on monitor   Gastrointestinal: Soft, non distended, nontender, RLQ incisional scar healed, some erythema.  anasarca throughout  Genitourinary: anuric on HD   Extremities: SCD's in place and working bilaterally, overall with worsening edema  Vascular: Palpable dp pulses bilaterally  Neurological: AAOx3  Skin: no rashes, ulcerations or lesions  Musculoskeletal: Moving all extremities  Psychiatric: overall calm, but with  periods of agitation

## 2022-08-07 NOTE — PROGRESS NOTE ADULT - ASSESSMENT
66 year old male with PMH of  DM type II, HTN, CAD s/p CABG in 2020, Afib on Eliquis, CVA in 2019 due to Afib, Seizure d/o (last episode was on 4/8/22), h/o GI bleed in 2/2022 EGD with duodenal ulcer and ESRD on HD s/p R DDRT from DCD donor on 4/21/22 complicated by DGF requiring HD until 4/29/22, later had good graft function who was readmitted with status epilepticus in setting of UTI/antibiotics and sub-therapeutic valproic acid level.  Transferred to SICU on 7/25 with signs of sepsis. Biopsy from 7/29 demonstrating grade 1a rejection. Started pulse steroids (Solumedrol 500 on 7/30 -> 250 on 7/31).     1. 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 No  DSA.   s/p pulse dose steroids. Thymo was not given to treat the rejection as he is too frail and at increased risk for infections. Now with failed allograft function, Remains anuric and is dialysis dependent.   Last IHD was on 8/6 with  UF of 2L   The dialysis cathter needs to be replaced as we are able to get a BFR of only 200. Scheduled for IR Monday. (8/8)    2. IS meds- was on Belatacept. now on Sirolimus ,  mg po bid and steroid taper.   3. AMS , seizures - had 3 episodes of seizures on 8/3. CT head on 8/3 was negative.  On Fosphenytoin Neurology recs appreciated. No further episodes of seizures since 8/3  4. DM -  on Lantus and lispro.   5. Cellulitis over RLQ - WBC improved to 19K today . On Vancomycin . CT A/P on 8/6 revealed a retroperitoneal hematoma. Txp USG 8/6  - Patent renal artery vasculature. Stable appearance the transplant kidney .Persistent elevated resistive indices  Diffuse abdominal wall edematous changes right lower quadrant without focal collection  cultures from 8/3  negative so far.   6.  ESBL klebsiella UTI (7/15 + UCx, 7/25 +UCx)- completed a course of  Ertapenam on  8/3 + Fluconazole.

## 2022-08-07 NOTE — PROGRESS NOTE ADULT - PROBLEM SELECTOR PLAN 1
-Test BG AC/HS  -Start low dose Lantus 5 units at 6pm.   -c/w Admelog moderate correction scale AC and mod HS scale  -Please contact endo team with  changes on steroid doses/diet to make appropriate insulin doses adjustments.   Discharge  plan to rehab, c/w basal bolus insulin final doses as above if FBG at goal  Titrate further at rehab as necessary .   Outpatient f/u with Endocrinologist Dr. Lincoln. [address] [facility] [address]. Phone  Needs apt at time of discharge  -Needs optho/renal/cardiac f/u as out pt  -Make sure pt has insulin and DM supplies at time of discharge.

## 2022-08-07 NOTE — PROGRESS NOTE ADULT - NUTRITIONAL ASSESSMENT
Diet, Soft and Bite Sized:   Consistent Carbohydrate {Evening Snack} (CSTCHOSN)  For patients receiving Renal Replacement - No Protein Restr, No Conc K, No Conc Phos, Low Sodium (RENAL)  Supplement Feeding Modality:  Oral  Nepro Cans or Servings Per Day:  3       Frequency:  Three Times a day (08-07-22 @ 13:47) [Active]

## 2022-08-07 NOTE — PROGRESS NOTE ADULT - NS ATTEND AMEND GEN_ALL_CORE FT
cellulitis improving.  poorly functioning graft.   on maintenance HD.  no recent seizures  immuno: sirolomus, cellcept reduced dose, pred.

## 2022-08-07 NOTE — PROGRESS NOTE ADULT - SUBJECTIVE AND OBJECTIVE BOX
HISTORY OF PRESENT ILLNESS:  HPI:  Mr. Castelan is a 67 y/o M with a PMH of T2DM, ESRD ( HD via R. AV fistula since 2019), HTN, CAD s/p CABG 2020, A. Fib ( On [place]), CVA ( 2019 2/2 A. Fib), Seizure d/o after CVA in 2019, GIB ( 02/2022) 2/2 duodenal ulcer. Pt had a Right DCD DDRT performed on 4/21/22 c/b DGF, requiring HD until 4/29/22. Pt readmitted May 2022 for anemia in the setting of a large perinephric hematoma s/p evacuation and repair and arterial anastomosis on 5/02/22. Intraoperative bx negative for rejection with a Cr ~ 2mg/dL.     Pt discharge to rehab where he was diagnosed with Klebsiella UTI s/p Ertapenem for 5 days and switched to Levaquin. Rehab further c/b parainfluenza PNA and an epsiode of status epilepticus on 06/10/22. Pt intubated for airway protection and admitted to MICU at [facility]. Pt extubated 6/11 and found to have a R. pleural effusion s/p Thoracentesis 1.3L drained. Pt started on heparin gtt with decrease in H/H and received 5U PRBC and 2U FFP. Pt transferred to SICU service s/p VATS on 6/13 with 2.2L blood removed and a second chest tube was placed, removed on 6/18. Hospital course further c/b refractory seizures improved with antiepileptic regimen and was transferred to the floor on 7/10/22. Pt went to OR on 7/11 for ureteral stent removed.     Hospital course further c/b ESBL Klebsiella UTI s/p Ertapenem for a 3 day course. Pt found to have worsening leukopenia to 1.97 s/p Neupogen. Pt readmitted to SICU for leukopenia rising LFTs and, rising creatinine requiring intermittent HD via LIJ Shiley Catheter. Right Renal Bx of the transplant kidney performed by IR on 07/29/22 reporting Grade 1a rejection, started on steroids. Subsequently, pt had 3 Focal Seizures overnight on 8/4/22. EEG reported 2 Focal motor seizures originating from the left posterior temporal region with frontal evolution with mild focal left centroparietal slowing and generalized background slowing. CT Head performed after second focal siezure due to increased weakness of the Right upper extremity, strength 3/5. CT Head unremarkable for acute infarct, mass effect, or hemorrhage. Pt phenytoin changed to Fosphenytoin. Ertapenem d/c due to lowering seizure threshold and started on Vanco by level given rising leukocytosis and increasing erythema around Right groin incision Pt remains in SICU for further assessment and hemodynamic monitoring.       ----------  CODE STATUS: Full    ----------  VITAL SIGNS:  T(C): 36.6 (08-06-22 @ 15:00), Max: 36.8 (08-06-22 @ 03:00)  HR: 56 (08-07-22 @ 00:00) (48 - 551)  BP: 137/63 (08-07-22 @ 00:00) (115/717 - 170/77)  ABP: --  ABP(mean): --  RR: 10 (08-07-22 @ 00:00) (10 - 26)  SpO2: 97% (08-07-22 @ 00:00) (96% - 100%)  CVP(mm Hg): --  ----------  MEDICATIONS  (STANDING):  amLODIPine   Tablet 10 milliGRAM(s) Oral daily  chlorhexidine 2% Cloths 1 Application(s) Topical <User Schedule>  doxazosin 4 milliGRAM(s) Oral <User Schedule>  epoetin cortney-epbx (RETACRIT) Injectable 95565 Unit(s) SubCutaneous every 7 days  finasteride 5 milliGRAM(s) Oral daily  fosphenytoin IVPB 200 milliGRAM(s) PE IV Intermittent every 12 hours  hydrALAZINE 25 milliGRAM(s) Oral every 8 hours  insulin lispro (ADMELOG) corrective regimen sliding scale   SubCutaneous every 6 hours  levothyroxine Injectable 37.5 MICROGram(s) IV Push at bedtime  melatonin 6 milliGRAM(s) Oral at bedtime  mycophenolate mofetil Suspension 1000 milliGRAM(s) Enteral Tube <User Schedule>  pantoprazole  Injectable 40 milliGRAM(s) IV Push daily  polyethylene glycol 3350 17 Gram(s) Oral daily  predniSONE   Tablet 20 milliGRAM(s) Oral daily  senna Syrup 10 milliLiter(s) Oral at bedtime  trimethoprim  40 mG/sulfamethoxazole 200 mG Suspension 80 milliGRAM(s) Oral daily  valGANciclovir 50 mG/mL Oral Solution 450 milliGRAM(s) Oral <User Schedule>    MEDICATIONS  (PRN):  acetaminophen    Suspension .. 650 milliGRAM(s) Enteral Tube every 6 hours PRN Mild Pain (1 - 3)    ----------  NEURO    Exam: Intubated, sedated.     acetaminophen    Suspension .. 650 milliGRAM(s) Enteral Tube every 6 hours PRN Mild Pain (1 - 3)  fosphenytoin IVPB 200 milliGRAM(s) PE IV Intermittent every 12 hours  melatonin 6 milliGRAM(s) Oral at bedtime    ----------  RESPIRATORY    Exam: Respirations grossly unlabored, b/l chest rise. No wheezes / rhonchi / stridor / crackles.       ----------  CARDIOVASCULAR    Exam: No murmurs / rubs / gallops. No CP or SOB    Cardiac Rhythm: NSR on telemetry in sicu      amLODIPine   Tablet 10 milliGRAM(s) Oral daily  doxazosin 4 milliGRAM(s) Oral <User Schedule>  hydrALAZINE 25 milliGRAM(s) Oral every 8 hours    ----------  GI/NUTRITION    Exam: SNTND    Diet: TF at 20cc    pantoprazole  Injectable 40 milliGRAM(s) IV Push daily  polyethylene glycol 3350 17 Gram(s) Oral daily  senna Syrup 10 milliLiter(s) Oral at bedtime    ----------  GENITOURINARY/RENAL            08-05 @ 07:01 - 08-06 @ 07:00  --------------------------------------------------------  IN: 960 mL / OUT: 1500 mL / NET: -540 mL    08-06 @ 07:01 - 08-07 @ 00:54  --------------------------------------------------------  IN: 1440 mL / OUT: 2800 mL / NET: -1360 mL        08-06    131<L>  |  91<L>  |  72<H>  ----------------------------<  160<H>  4.6   |  23  |  3.91<H>    Ca    8.6      06 Aug 2022 05:37  Phos  5.2     08-06  Mg     2.2     08-06    TPro  5.9<L>  /  Alb  2.1<L>  /  TBili  0.3  /  DBili  0.2  /  AST  26  /  ALT  21  /  AlkPhos  308<H>  08-06        ----------  HEMATOLOGIC  Transfusion: [ ] No transfusion in past 24h.  [ ] PRBC	[ ] Platelets	[ ] FFP	[ ] Cryoprecipitate  VTE Prophylaxis:   VTE Prophylaxis: No VTE ppx given pt is therapeutically anticoagulated / has had evidence of recent hemorrhage.                        9.7    22.92 )-----------( 386      ( 06 Aug 2022 05:37 )             29.5     PT/INR - ( 06 Aug 2022 05:37 )   PT: 14.8 sec;   INR: 1.27 ratio         PTT - ( 06 Aug 2022 05:37 )  PTT:31.1 sec  ----------  INFECTIOUS DISEASES  epoetin cortney-epbx (RETACRIT) Injectable 15214 Unit(s) SubCutaneous every 7 days  mycophenolate mofetil Suspension 1000 milliGRAM(s) Enteral Tube <User Schedule>  trimethoprim  40 mG/sulfamethoxazole 200 mG Suspension 80 milliGRAM(s) Oral daily  valGANciclovir 50 mG/mL Oral Solution 450 milliGRAM(s) Oral <User Schedule>                          9.7    22.92 )-----------( 386      ( 06 Aug 2022 05:37 )             29.5       ----------  ENDOCRINE  finasteride 5 milliGRAM(s) Oral daily  insulin lispro (ADMELOG) corrective regimen sliding scale   SubCutaneous every 6 hours  levothyroxine Injectable 37.5 MICROGram(s) IV Push at bedtime  predniSONE   Tablet 20 milliGRAM(s) Oral daily    CAPILLARY BLOOD GLUCOSE    POCT Blood Glucose.: 162 mg/dL (06 Aug 2022 17:45)  POCT Blood Glucose.: 180 mg/dL (06 Aug 2022 11:06)    ----------  TUBES AND LINES:  [x] Peripheral IV  [ ] Central Venous Line	[ ] R	[ ] L	[ ] IJ	        [ ] Fem	[ ] SC	Placed:   [ ] Arterial Line		        [ ] R	[ ] L	[ ] Fem	[ ] Rad	[ ] Ax	Placed:   [ ] PICC:					[ ] Mediport  [ ] Urinary Catheter                                                                             Placed:   [ ] Chest Tube                       [ ] R [ ] L                   Output: __cc/24h    [x] Necessity of urinary, arterial, and venous catheters discussed with SICU team on rounds.    ----------  OTHER MEDICATIONS:   chlorhexidine 2% Cloths 1 Application(s) Topical <User Schedule>    ----------  IMAGING STUDIES:  PACS Image: Image(s) Available (08-05-22 @ 17:26)  PACS Image: Image(s) Available (08-05-22 @ 09:53)  PACS Image: Image(s) Available (08-05-22 @ 09:52)  PACS Image: Image(s) Available (08-05-22 @ 07:06)         Interval/Overnight Events:     - c/o pain to surgical site, given oxy with improved   - HD performed with 2L removal    HPI:  Mr. Castelan is a 67 y/o M with a PMH of T2DM, ESRD ( HD via R. AV fistula since 2019), HTN, CAD s/p CABG 2020, A. Fib ( [place]), CVA ( 2019 2/2 A. Fib), Seizure d/o after CVA in 2019, GIB ( 02/2022) 2/2 duodenal ulcer. Pt had a Right DCD DDRT performed on 4/21/22 c/b DGF, requiring HD until 4/29/22. Pt readmitted May 2022 for anemia in the setting of a large perinephric hematoma s/p evacuation and repair and arterial anastomosis on 5/02/22. Intraoperative bx negative for rejection with a Cr ~ 2mg/dL.     Pt discharge to rehab where he was diagnosed with Klebsiella UTI s/p Ertapenem for 5 days and switched to Levaquin. Rehab further c/b parainfluenza PNA and an epsiode of status epilepticus on 06/10/22. Pt intubated for airway protection and admitted to MICU at [facility]. Pt extubated 6/11 and found to have a R. pleural effusion s/p Thoracentesis 1.3L drained. Pt started on heparin gtt with decrease in H/H and received 5U PRBC and 2U FFP. Pt transferred to SICU service s/p VATS on 6/13 with 2.2L blood removed and a second chest tube was placed, removed on 6/18. Hospital course further c/b refractory seizures improved with antiepileptic regimen and was transferred to the floor on 7/10/22. Pt went to OR on 7/11 for ureteral stent removed.     Hospital course further c/b ESBL Klebsiella UTI s/p Ertapenem for a 3 day course. Pt found to have worsening leukopenia to 1.97 s/p Neupogen. Pt readmitted to SICU for leukopenia rising LFTs and, rising creatinine requiring intermittent HD via LIJ Shiley Catheter. Right Renal Bx of the transplant kidney performed by IR on 07/29/22 reporting Grade 1a rejection, started on steroids. Subsequently, pt had 3 Focal Seizures overnight on 8/4/22. EEG reported 2 Focal motor seizures originating from the left posterior temporal region with frontal evolution with mild focal left centroparietal slowing and generalized background slowing. CT Head performed after second focal siezure due to increased weakness of the Right upper extremity, strength 3/5. CT Head unremarkable for acute infarct, mass effect, or hemorrhage. Pt phenytoin changed to Fosphenytoin. Ertapenem d/c due to lowering seizure threshold and started on Vanco by level given rising leukocytosis and increasing erythema around Right groin incision Pt remains in SICU for further assessment and hemodynamic monitoring.       ----------  CODE STATUS: Full    ----------  VITAL SIGNS:  T(C): 36.6 (08-06-22 @ 15:00), Max: 36.8 (08-06-22 @ 03:00)  HR: 56 (08-07-22 @ 00:00) (48 - 551)  BP: 137/63 (08-07-22 @ 00:00) (115/717 - 170/77)  ABP: --  ABP(mean): --  RR: 10 (08-07-22 @ 00:00) (10 - 26)  SpO2: 97% (08-07-22 @ 00:00) (96% - 100%)  CVP(mm Hg): --  ----------  MEDICATIONS  (STANDING):  amLODIPine   Tablet 10 milliGRAM(s) Oral daily  chlorhexidine 2% Cloths 1 Application(s) Topical <User Schedule>  doxazosin 4 milliGRAM(s) Oral <User Schedule>  epoetin crotney-epbx (RETACRIT) Injectable 61826 Unit(s) SubCutaneous every 7 days  finasteride 5 milliGRAM(s) Oral daily  fosphenytoin IVPB 200 milliGRAM(s) PE IV Intermittent every 12 hours  hydrALAZINE 25 milliGRAM(s) Oral every 8 hours  insulin lispro (ADMELOG) corrective regimen sliding scale   SubCutaneous every 6 hours  levothyroxine Injectable 37.5 MICROGram(s) IV Push at bedtime  melatonin 6 milliGRAM(s) Oral at bedtime  mycophenolate mofetil Suspension 1000 milliGRAM(s) Enteral Tube <User Schedule>  pantoprazole  Injectable 40 milliGRAM(s) IV Push daily  polyethylene glycol 3350 17 Gram(s) Oral daily  predniSONE   Tablet 20 milliGRAM(s) Oral daily  senna Syrup 10 milliLiter(s) Oral at bedtime  trimethoprim  40 mG/sulfamethoxazole 200 mG Suspension 80 milliGRAM(s) Oral daily  valGANciclovir 50 mG/mL Oral Solution 450 milliGRAM(s) Oral <User Schedule>    MEDICATIONS  (PRN):  acetaminophen    Suspension .. 650 milliGRAM(s) Enteral Tube every 6 hours PRN Mild Pain (1 - 3)    ----------  NEURO    Exam: Intubated, sedated.     acetaminophen    Suspension .. 650 milliGRAM(s) Enteral Tube every 6 hours PRN Mild Pain (1 - 3)  fosphenytoin IVPB 200 milliGRAM(s) PE IV Intermittent every 12 hours  melatonin 6 milliGRAM(s) Oral at bedtime    ----------  RESPIRATORY    Exam: Respirations grossly unlabored, b/l chest rise. No wheezes / rhonchi / stridor / crackles.       ----------  CARDIOVASCULAR    Exam: No murmurs / rubs / gallops. No CP or SOB    Cardiac Rhythm: NSR on telemetry in sicu      amLODIPine   Tablet 10 milliGRAM(s) Oral daily  doxazosin 4 milliGRAM(s) Oral <User Schedule>  hydrALAZINE 25 milliGRAM(s) Oral every 8 hours    ----------  GI/NUTRITION    Exam: SNTND    Diet: TF at 20cc    pantoprazole  Injectable 40 milliGRAM(s) IV Push daily  polyethylene glycol 3350 17 Gram(s) Oral daily  senna Syrup 10 milliLiter(s) Oral at bedtime    ----------  GENITOURINARY/RENAL            08-05 @ 07:01 - 08-06 @ 07:00  --------------------------------------------------------  IN: 960 mL / OUT: 1500 mL / NET: -540 mL    08-06 @ 07:01 - 08-07 @ 00:54  --------------------------------------------------------  IN: 1440 mL / OUT: 2800 mL / NET: -1360 mL        08-06    131<L>  |  91<L>  |  72<H>  ----------------------------<  160<H>  4.6   |  23  |  3.91<H>    Ca    8.6      06 Aug 2022 05:37  Phos  5.2     08-06  Mg     2.2     08-06    TPro  5.9<L>  /  Alb  2.1<L>  /  TBili  0.3  /  DBili  0.2  /  AST  26  /  ALT  21  /  AlkPhos  308<H>  08-06        ----------  HEMATOLOGIC  Transfusion: [ ] No transfusion in past 24h.  [ ] PRBC	[ ] Platelets	[ ] FFP	[ ] Cryoprecipitate  VTE Prophylaxis:   VTE Prophylaxis: No VTE ppx given pt is therapeutically anticoagulated / has had evidence of recent hemorrhage.                        9.7    22.92 )-----------( 386      ( 06 Aug 2022 05:37 )             29.5     PT/INR - ( 06 Aug 2022 05:37 )   PT: 14.8 sec;   INR: 1.27 ratio         PTT - ( 06 Aug 2022 05:37 )  PTT:31.1 sec  ----------  INFECTIOUS DISEASES  epoetin cortney-epbx (RETACRIT) Injectable 80801 Unit(s) SubCutaneous every 7 days  mycophenolate mofetil Suspension 1000 milliGRAM(s) Enteral Tube <User Schedule>  trimethoprim  40 mG/sulfamethoxazole 200 mG Suspension 80 milliGRAM(s) Oral daily  valGANciclovir 50 mG/mL Oral Solution 450 milliGRAM(s) Oral <User Schedule>                          9.7    22.92 )-----------( 386      ( 06 Aug 2022 05:37 )             29.5       ----------  ENDOCRINE  finasteride 5 milliGRAM(s) Oral daily  insulin lispro (ADMELOG) corrective regimen sliding scale   SubCutaneous every 6 hours  levothyroxine Injectable 37.5 MICROGram(s) IV Push at bedtime  predniSONE   Tablet 20 milliGRAM(s) Oral daily    CAPILLARY BLOOD GLUCOSE    POCT Blood Glucose.: 162 mg/dL (06 Aug 2022 17:45)  POCT Blood Glucose.: 180 mg/dL (06 Aug 2022 11:06)    ----------  TUBES AND LINES:  [x] Peripheral IV  [ ] Central Venous Line	[ ] R	[ ] L	[ ] IJ	        [ ] Fem	[ ] SC	Placed:   [ ] Arterial Line		        [ ] R	[ ] L	[ ] Fem	[ ] Rad	[ ] Ax	Placed:   [ ] PICC:					[ ] Mediport  [ ] Urinary Catheter                                                                             Placed:   [ ] Chest Tube                       [ ] R [ ] L                   Output: __cc/24h    [x] Necessity of urinary, arterial, and venous catheters discussed with SICU team on rounds.    ----------  OTHER MEDICATIONS:   chlorhexidine 2% Cloths 1 Application(s) Topical <User Schedule>    ----------  IMAGING STUDIES:  PACS Image: Image(s) Available (08-05-22 @ 17:26)  PACS Image: Image(s) Available (08-05-22 @ 09:53)  PACS Image: Image(s) Available (08-05-22 @ 09:52)  PACS Image: Image(s) Available (08-05-22 @ 07:06)         Interval/Overnight Events:     - c/o pain to surgical site, given oxy with improved   - HD performed with 2L removal  - IR Shiley exchange tomorrow  - no SQH  - PAssed swalloed test, and reg ulet ordered.    HPI:  Mr. Castelan is a 67 y/o M with a PMH of T2DM, ESRD ( HD via R. AV fistula since 2019), HTN, CAD s/p CABG 2020, A. Fib ( On [place]), CVA ( 2019 2/2 A. Fib), Seizure d/o after CVA in 2019, GIB ( 02/2022) 2/2 duodenal ulcer. Pt had a Right DCD DDRT performed on 4/21/22 c/b DGF, requiring HD until 4/29/22. Pt readmitted May 2022 for anemia in the setting of a large perinephric hematoma s/p evacuation and repair and arterial anastomosis on 5/02/22. Intraoperative bx negative for rejection with a Cr ~ 2mg/dL.     Pt discharge to rehab where he was diagnosed with Klebsiella UTI s/p Ertapenem for 5 days and switched to Levaquin. Rehab further c/b parainfluenza PNA and an epsiode of status epilepticus on 06/10/22. Pt intubated for airway protection and admitted to MICU at [facility]. Pt extubated 6/11 and found to have a R. pleural effusion s/p Thoracentesis 1.3L drained. Pt started on heparin gtt with decrease in H/H and received 5U PRBC and 2U FFP. Pt transferred to SICU service s/p VATS on 6/13 with 2.2L blood removed and a second chest tube was placed, removed on 6/18. Hospital course further c/b refractory seizures improved with antiepileptic regimen and was transferred to the floor on 7/10/22. Pt went to OR on 7/11 for ureteral stent removed.     Hospital course further c/b ESBL Klebsiella UTI s/p Ertapenem for a 3 day course. Pt found to have worsening leukopenia to 1.97 s/p Neupogen. Pt readmitted to SICU for leukopenia rising LFTs and, rising creatinine requiring intermittent HD via LIJ Shiley Catheter. Right Renal Bx of the transplant kidney performed by IR on 07/29/22 reporting Grade 1a rejection, started on steroids. Subsequently, pt had 3 Focal Seizures overnight on 8/4/22. EEG reported 2 Focal motor seizures originating from the left posterior temporal region with frontal evolution with mild focal left centroparietal slowing and generalized background slowing. CT Head performed after second focal siezure due to increased weakness of the Right upper extremity, strength 3/5. CT Head unremarkable for acute infarct, mass effect, or hemorrhage. Pt phenytoin changed to Fosphenytoin. Ertapenem d/c due to lowering seizure threshold and started on Vanco by level given rising leukocytosis and increasing erythema around Right groin incision Pt remains in SICU for further assessment and hemodynamic monitoring.       ----------  CODE STATUS: Full    ----------  VITAL SIGNS:  T(C): 36.6 (08-06-22 @ 15:00), Max: 36.8 (08-06-22 @ 03:00)  HR: 56 (08-07-22 @ 00:00) (48 - 551)  BP: 137/63 (08-07-22 @ 00:00) (115/717 - 170/77)  ABP: --  ABP(mean): --  RR: 10 (08-07-22 @ 00:00) (10 - 26)  SpO2: 97% (08-07-22 @ 00:00) (96% - 100%)  CVP(mm Hg): --  ----------  MEDICATIONS  (STANDING):  amLODIPine   Tablet 10 milliGRAM(s) Oral daily  chlorhexidine 2% Cloths 1 Application(s) Topical <User Schedule>  doxazosin 4 milliGRAM(s) Oral <User Schedule>  epoetin cortney-epbx (RETACRIT) Injectable 20671 Unit(s) SubCutaneous every 7 days  finasteride 5 milliGRAM(s) Oral daily  fosphenytoin IVPB 200 milliGRAM(s) PE IV Intermittent every 12 hours  hydrALAZINE 25 milliGRAM(s) Oral every 8 hours  insulin lispro (ADMELOG) corrective regimen sliding scale   SubCutaneous every 6 hours  levothyroxine Injectable 37.5 MICROGram(s) IV Push at bedtime  melatonin 6 milliGRAM(s) Oral at bedtime  mycophenolate mofetil Suspension 1000 milliGRAM(s) Enteral Tube <User Schedule>  pantoprazole  Injectable 40 milliGRAM(s) IV Push daily  polyethylene glycol 3350 17 Gram(s) Oral daily  predniSONE   Tablet 20 milliGRAM(s) Oral daily  senna Syrup 10 milliLiter(s) Oral at bedtime  trimethoprim  40 mG/sulfamethoxazole 200 mG Suspension 80 milliGRAM(s) Oral daily  valGANciclovir 50 mG/mL Oral Solution 450 milliGRAM(s) Oral <User Schedule>    MEDICATIONS  (PRN):  acetaminophen    Suspension .. 650 milliGRAM(s) Enteral Tube every 6 hours PRN Mild Pain (1 - 3)    ----------  NEURO    Exam: Intubated, sedated.     acetaminophen    Suspension .. 650 milliGRAM(s) Enteral Tube every 6 hours PRN Mild Pain (1 - 3)  fosphenytoin IVPB 200 milliGRAM(s) PE IV Intermittent every 12 hours  melatonin 6 milliGRAM(s) Oral at bedtime    ----------  RESPIRATORY    Exam: Respirations grossly unlabored, b/l chest rise. No wheezes / rhonchi / stridor / crackles.       ----------  CARDIOVASCULAR    Exam: No murmurs / rubs / gallops. No CP or SOB    Cardiac Rhythm: NSR on telemetry in sicu      amLODIPine   Tablet 10 milliGRAM(s) Oral daily  doxazosin 4 milliGRAM(s) Oral <User Schedule>  hydrALAZINE 25 milliGRAM(s) Oral every 8 hours    ----------  GI/NUTRITION    Exam: SNTND    Diet: TF at 20cc    pantoprazole  Injectable 40 milliGRAM(s) IV Push daily  polyethylene glycol 3350 17 Gram(s) Oral daily  senna Syrup 10 milliLiter(s) Oral at bedtime    ----------  GENITOURINARY/RENAL            08-05 @ 07:01 - 08-06 @ 07:00  --------------------------------------------------------  IN: 960 mL / OUT: 1500 mL / NET: -540 mL    08-06 @ 07:01  -  08-07 @ 00:54  --------------------------------------------------------  IN: 1440 mL / OUT: 2800 mL / NET: -1360 mL        08-06    131<L>  |  91<L>  |  72<H>  ----------------------------<  160<H>  4.6   |  23  |  3.91<H>    Ca    8.6      06 Aug 2022 05:37  Phos  5.2     08-06  Mg     2.2     08-06    TPro  5.9<L>  /  Alb  2.1<L>  /  TBili  0.3  /  DBili  0.2  /  AST  26  /  ALT  21  /  AlkPhos  308<H>  08-06        ----------  HEMATOLOGIC  Transfusion: [ ] No transfusion in past 24h.  [ ] PRBC	[ ] Platelets	[ ] FFP	[ ] Cryoprecipitate  VTE Prophylaxis:   VTE Prophylaxis: No VTE ppx given pt is therapeutically anticoagulated / has had evidence of recent hemorrhage.                        9.7    22.92 )-----------( 386      ( 06 Aug 2022 05:37 )             29.5     PT/INR - ( 06 Aug 2022 05:37 )   PT: 14.8 sec;   INR: 1.27 ratio         PTT - ( 06 Aug 2022 05:37 )  PTT:31.1 sec  ----------  INFECTIOUS DISEASES  epoetin cortney-epbx (RETACRIT) Injectable 64142 Unit(s) SubCutaneous every 7 days  mycophenolate mofetil Suspension 1000 milliGRAM(s) Enteral Tube <User Schedule>  trimethoprim  40 mG/sulfamethoxazole 200 mG Suspension 80 milliGRAM(s) Oral daily  valGANciclovir 50 mG/mL Oral Solution 450 milliGRAM(s) Oral <User Schedule>                          9.7    22.92 )-----------( 386      ( 06 Aug 2022 05:37 )             29.5       ----------  ENDOCRINE  finasteride 5 milliGRAM(s) Oral daily  insulin lispro (ADMELOG) corrective regimen sliding scale   SubCutaneous every 6 hours  levothyroxine Injectable 37.5 MICROGram(s) IV Push at bedtime  predniSONE   Tablet 20 milliGRAM(s) Oral daily    CAPILLARY BLOOD GLUCOSE    POCT Blood Glucose.: 162 mg/dL (06 Aug 2022 17:45)  POCT Blood Glucose.: 180 mg/dL (06 Aug 2022 11:06)    ----------  TUBES AND LINES:  [x] Peripheral IV  [ ] Central Venous Line	[ ] R	[ ] L	[ ] IJ	        [ ] Fem	[ ] SC	Placed:   [ ] Arterial Line		        [ ] R	[ ] L	[ ] Fem	[ ] Rad	[ ] Ax	Placed:   [ ] PICC:					[ ] Mediport  [ ] Urinary Catheter                                                                             Placed:   [ ] Chest Tube                       [ ] R [ ] L                   Output: __cc/24h    [x] Necessity of urinary, arterial, and venous catheters discussed with SICU team on rounds.    ----------  OTHER MEDICATIONS:   chlorhexidine 2% Cloths 1 Application(s) Topical <User Schedule>    ----------  IMAGING STUDIES:  PACS Image: Image(s) Available (08-05-22 @ 17:26)  PACS Image: Image(s) Available (08-05-22 @ 09:53)  PACS Image: Image(s) Available (08-05-22 @ 09:52)  PACS Image: Image(s) Available (08-05-22 @ 07:06)

## 2022-08-07 NOTE — CONSULT NOTE ADULT - ASSESSMENT
Interventional Radiology    Evaluate for Procedure: exchange of left IJ non-tunneled HD catheter    HPI: 67 yo male with ESRD s/p DDRT 4/21/22 c/b rejection. IR placed left EJ non-tunneled 14F 20cm HD catheter on 7/27 (RIJ is occluded). Last dialysis on 8/5 (inadequate flow rates, 200 as per nephrology). IR consulted for exchange of left EJ non-tunneled HD catheter.      Allergies:   Medications (Abx/Cardiac/Anticoagulation/Blood Products)    amLODIPine   Tablet: 10 milliGRAM(s) Oral (08-07 @ 05:18)  doxazosin: 4 milliGRAM(s) Oral (08-07 @ 05:18)  hydrALAZINE: 25 milliGRAM(s) Oral (08-07 @ 05:19)  trimethoprim  40 mG/sulfamethoxazole 200 mG Suspension: 80 milliGRAM(s) Oral (08-06 @ 11:10)  valGANciclovir 50 mG/mL Oral Solution: 450 milliGRAM(s) Oral (08-05 @ 12:07)  vancomycin  IVPB: 250 mL/Hr IV Intermittent (08-05 @ 08:10)    Data:    T(C): 37.1  HR: 58  BP: 140/65  RR: 14  SpO2: 98%    -WBC 19.38 / HgB 9.3 / Hct 28.9 / Plt 365  -Na 134 / Cl 93 / BUN 58 / Glucose 183  -K 4.3 / CO2 22 / Cr 3.68  -ALT 19 / Alk Phos 286 / T.Bili 0.2  -INR 1.26 / PTT 30.3      Radiology: CXR reviewed; left EJ non-tunneled HD catheter tip is in the SVC    Assessment/Plan:   67 yo male with ESRD s/p DDRT 4/21/22 c/b rejection. IR placed left EJ non-tunneled 14F 20cm HD catheter on 7/27 (RIJ is occluded). Last dialysis on 8/5 (flow rates, 200 as per nephrology). IR consulted for exchange of left EJ non-tunneled HD catheter.    -- IR will plan to perform exchange of left EJ non-tunneled HD catheter tomorrow, 8/8  -- OK to dialyze today through the catheter, prior to tomorrow's exchange  -- please place IR procedure request order under Dr. Wyman    --  Andrea Knight DO/PEREZ, PGY-4  Vascular and Interventional Radiology   Available on [employer] Teams    - Non-emergent consults: Place IR consult order in [facility]  - Emergent issues (pager): [facility] [phone]; [facility] [phone]; [phone]  - Scheduling questions: [facility] [phone]; [facility] [phone]  - Clinic/outpatient booking: [facility] [phone]; [facility] [phone]

## 2022-08-07 NOTE — PROGRESS NOTE ADULT - ASSESSMENT
66M with h/o DM type II, HTN, CAD s/p CABG in 2020, Afib on Eliquis, CVA in 2019 due to Afib, Seizure d/o (last episode was on 4/8/22), h/o GI bleed in 2/2022 EGD with duodenal ulcer and ESRD on HD s/p R DDRT from DCD donor on 4/21/22 complicated by DGF requiring HD until 4/29/22 who presented with status epilepticus in setting of UTI/antibiotics and sub-therapeutic valproic acid level. Transferred to SICU on 7/25 with signs of sepsis, now with refractory seizures    Seizure Disorder:  - refractory seizures o/n.  Neuro/Epilepsy Teams following. Follow recommendations closely. continue EEG. Avoid Fycompa 2/2 agitation in past  - prior MRI head 6/27 with less concerns for PRES, likely ischemic changes  - o/n CTH 8/3 with no acute findings  - replete lytes aggressively    R DCD DDRT 4/21/22 now with 2A ACR  - 2A ACR: s/p pulse steroids, now on 20mg QD will keep   - will continue HD daily per renal given persistent fluid overload and anuria  - On vanc by level for ppx (level today 15.6, not dosed today). Bld cx from 8/3 w/ NGTD  - graft doppler stable, stable perinephric collection pending read from yesterday  - S/P removal of ureteral stent on 7/12  - Continue Doxazosin/Proscar for BPH  - OOB with RN/PT  - DSA negative   - ADAT  - follow up CT C/A/P and renal ultrasound today    Immunosuppression:   - Belatacept: 6/23, 7/4, 7/8. Re-loaded 7/8,  as there was a gap between doses 2/2 seizures. 7/18,  last dose 8/1  - on Sirolimus as of 8/2 per level (3.4 increase to 2mg today)  - continue Pred 5  - PPx: Valcyte/ bactrim on hold, will restart as able    DM  - steroid induced hyperglycemia, improving with Endocrine management    AFib/R IJ DVT   - Eliquis with ~40% less efficacy while on fosphenytoin.    - Lovenox held for ANA and transaminitis   - Hold HSQ for now given hematoma  - rate controlled  - will discuss A/C plans daily    HTN:  - Nifedipine/Cardura/Hydralazline  - off Coreg due to bradycardia    DISPO  - continue SICU care 66M with h/o DM type II, HTN, CAD s/p CABG in 2020, Afib on Eliquis, CVA in 2019 due to Afib, Seizure d/o (last episode was on 4/8/22), h/o GI bleed in 2/2022 EGD with duodenal ulcer and ESRD on HD s/p R DDRT from DCD donor on 4/21/22 complicated by DGF requiring HD until 4/29/22 who presented with status epilepticus in setting of UTI/antibiotics and sub-therapeutic valproic acid level. Transferred to SICU on 7/25 with signs of sepsis, now with refractory seizures    Seizure Disorder:  - refractory seizures o/n.  Neuro/Epilepsy Teams following. Follow recommendations closely. continue EEG. Avoid Fycompa 2/2 agitation in past  - prior MRI head 6/27 with less concerns for PRES, likely ischemic changes  - o/n CTH 8/3 with no acute findings  - replete lytes aggressively    R DCD DDRT 4/21/22 now with 2A ACR  - 2A ACR: s/p pulse steroids, now on 20mg QD will keep   - will continue HD daily per renal given persistent fluid overload and anuria  - On vanc by level for ppx (level today 15.6, not dosed today). Bld cx from 8/3 w/ NGTD  - graft doppler stable, stable perinephric collection pending read from yesterday  - S/P removal of ureteral stent on 7/12  - Continue Doxazosin/Proscar for BPH  - OOB with RN/PT  - DSA negative   - ADAT  - follow up CT C/A/P and renal ultrasound today    Immunosuppression:   - Belatacept: 6/23, 7/4, 7/8. Re-loaded 7/8,  as there was a gap between doses 2/2 seizures. 7/18,  last dose 8/1  - on Sirolimus as of  8/2 per level (3.4 increase to 2mg today)  - decrease cellcept to 250 BID  - continue Pred 5  - PPx: Valcyte/ bactrim on hold, will restart as able    DM  - steroid induced hyperglycemia, improving with Endocrine management    AFib/R IJ DVT   - Eliquis with ~40% less efficacy while on fosphenytoin.    - Lovenox held for ANA and transaminitis   - Hold HSQ for now given hematoma  - rate controlled  - will discuss A/C plans daily    HTN:  - Nifedipine/Cardura/Hydralazline  - off Coreg due to bradycardia    DISPO  - continue SICU care

## 2022-08-08 NOTE — PROGRESS NOTE ADULT - ASSESSMENT
66 yr old M with Type 2 DM> unknown control due to skewed A1C 6.6% secondary to chronic anemia and s/p blood tx. On basal/bolus insulin therapy PTA. DM c/b ESRD s/p DDRT on 3/21, CVA, CAD s/p CABG, Afib on apixaban, seizure activity, HTN, HLD, h/o GI bleed in 2/2022 EGD with duodenal ulcer, discharged to [facility] rehab center after prior hospitalization, now re-admitted from [facility] for seizures c/f status epilepticus in setting of UTI. endocrine consulted for steroid induced hyperglycemia, remains home dose prednisone 5mg. Prolonged hospital course w/ ICU stay, previously intubated/extubated, previous seizures. S/p ureteral stent removal 7/12/22. On Prednisone 20mg daily for recent  rejection with anuria and worsening creat> requiring HD> S/p HD cath insertion today. Noted pt back on POs with BG levels 200s today. Didn't get any insulin for lunch and now noted order for Regular insulin IVP and initiation of insulin drip as per ICU team.  66 yr old M with Type 2 DM> unknown control due to skewed A1C 6.6% secondary to chronic anemia and s/p blood tx. On basal/bolus insulin therapy PTA. DM c/b ESRD s/p DDRT on 3/21, CVA, CAD s/p CABG, Afib on apixaban, seizure activity, HTN, HLD, h/o GI bleed in 2/2022 EGD with duodenal ulcer, discharged to [facility] rehab center after prior hospitalization, now re-admitted from [facility] for seizures c/f status epilepticus in setting of UTI. endocrine consulted for steroid induced hyperglycemia, remains home dose prednisone 5mg. Prolonged hospital course w/ ICU stay, previously intubated/extubated, previous seizures. S/p ureteral stent removal 7/12/22. On Prednisone 20mg daily for recent  rejection with anuria and worsening creat> requiring HD> S/p HD cath insertion today. Noted pt back on POs with BG levels 200s today. Didn't get any insulin for lunch per EMR but per RN received a total of 9 units for lunch with BG in 250s at dinner time so team starting insulin drip.

## 2022-08-08 NOTE — PROGRESS NOTE ADULT - SUBJECTIVE AND OBJECTIVE BOX
Follow Up:      Interval History:    REVIEW OF SYSTEMS  [  ] ROS unobtainable because:    [  ] All other systems negative except as noted below    Constitutional:  [ ] fever [ ] chills  [ ] weight loss  [ ] weakness  Skin:  [ ] rash [ ] phlebitis	  Eyes: [ ] icterus [ ] pain  [ ] discharge	  ENMT: [ ] sore throat  [ ] thrush [ ] ulcers [ ] exudates  Respiratory: [ ] dyspnea [ ] hemoptysis [ ] cough [ ] sputum	  Cardiovascular:  [ ] chest pain [ ] palpitations [ ] edema	  Gastrointestinal:  [ ] nausea [ ] vomiting [ ] diarrhea [ ] constipation [ ] pain	  Genitourinary:  [ ] dysuria [ ] frequency [ ] hematuria [ ] discharge [ ] flank pain  [ ] incontinence  Musculoskeletal:  [ ] myalgias [ ] arthralgias [ ] arthritis  [ ] back pain  Neurological:  [ ] headache [ ] seizures  [ ] confusion/altered mental status    Allergies  No Known Allergies        ANTIMICROBIALS:  trimethoprim   80 mG/sulfamethoxazole 400 mG 1 daily  valGANciclovir 450 <User Schedule>      OTHER MEDS:  MEDICATIONS  (STANDING):  amLODIPine   Tablet 10 daily  doxazosin 4 <User Schedule>  epoetin cortney-epbx (RETACRIT) Injectable 85869 every 7 days  finasteride 5 daily  fosphenytoin IVPB 200 every 12 hours  hydrALAZINE 25 every 8 hours  insulin regular  human recombinant. 3 once  insulin regular Infusion 3 <Continuous>  levothyroxine 50 daily  melatonin 6 at bedtime  mycophenolate mofetil 250 <User Schedule>  pantoprazole    Tablet 40 before breakfast  polyethylene glycol 3350 17 daily  predniSONE   Tablet 20 daily  senna 2 at bedtime  sirolimus 2.5 <User Schedule>      Vital Signs Last 24 Hrs  T(C): 36.8 (08 Aug 2022 15:00), Max: 38.3 (08 Aug 2022 02:50)  T(F): 98.2 (08 Aug 2022 15:00), Max: 100.9 (08 Aug 2022 02:50)  HR: 56 (08 Aug 2022 17:45) (53 - 62)  BP: 168/72 (08 Aug 2022 17:45) (128/60 - 183/76)  BP(mean): 104 (08 Aug 2022 17:00) (86 - 118)  RR: 23 (08 Aug 2022 17:45) (12 - 23)  SpO2: 100% (08 Aug 2022 17:45) (96% - 100%)    Parameters below as of 08 Aug 2022 17:45  Patient On (Oxygen Delivery Method): room air        PHYSICAL EXAMINATION:  General: Alert and Awake, NAD  HEENT: PERRL, EOMI  Neck: Supple  Cardiac: RRR, No M/R/G  Resp: CTAB, No Wh/Rh/Ra  Abdomen: NBS, NT/ND, No HSM, No rigidity or guarding  MSK: No LE edema. No Calf tenderness  : No tucker  Skin: No rashes or lesions. Skin is warm and dry to the touch.   Neuro: Alert and Awake. CN 2-12 Grossly intact. Moves all four extremities spontaneously.  Psych: Calm, Pleasant, Cooperative                          8.7    19.54 )-----------( 361      ( 08 Aug 2022 05:42 )             26.8       08-08    129<L>  |  90<L>  |  76<H>  ----------------------------<  275<H>  5.0   |  22  |  4.70<H>    Ca    8.4      08 Aug 2022 17:19  Phos  5.1     08-08  Mg     2.2     08-08    TPro  5.7<L>  /  Alb  2.1<L>  /  TBili  0.2  /  DBili  0.1  /  AST  18  /  ALT  15  /  AlkPhos  278<H>  08-08          MICROBIOLOGY:  Vancomycin Level, Random: 13.5 ug/mL (08-08-22 @ 05:42)  v  .Blood Blood  08-03-22   No growth to date.  --  --      .Blood Blood-Peripheral  07-30-22   No Growth Final  --  --      .Blood Blood-Peripheral  07-30-22   No Growth Final  --  --      .Blood Blood  07-27-22   No growth  --  --      Catheterized Catheterized  07-25-22   50,000 - 99,000 CFU/mL Klebsiella pneumoniae ESBL  --  Klebsiella pneumoniae ESBL      .Blood Blood  07-25-22   No Growth Final  --  --      .Blood Blood  07-25-22   No Growth Final  --  --      .Blood Blood  07-15-22   No Growth Final  --  --      .Blood Blood  07-15-22   No Growth Final  --  --      Clean Catch Clean Catch (Midstream)  07-13-22   50,000 - 99,000 CFU/mL Klebsiella pneumoniae ESBL  --  Klebsiella pneumoniae ESBL                RADIOLOGY:    <The imaging below has been reviewed and visualized by me independently. Findings as detailed in report below> Follow Up:  fever    Interval History: febrile this AM. continued pain at the RLQ abdominal wall.     REVIEW OF SYSTEMS  [  ] ROS unobtainable because:    [ x ] All other systems negative except as noted below    Constitutional:  [ ] fever [ ] chills  [ ] weight loss  [ ] weakness  Skin:  [ ] rash [ ] phlebitis	  Eyes: [ ] icterus [ ] pain  [ ] discharge	  ENMT: [ ] sore throat  [ ] thrush [ ] ulcers [ ] exudates  Respiratory: [ ] dyspnea [ ] hemoptysis [ ] cough [ ] sputum	  Cardiovascular:  [ ] chest pain [ ] palpitations [ ] edema	  Gastrointestinal:  [ ] nausea [ ] vomiting [ ] diarrhea [ ] constipation [x ] pain	  Genitourinary:  [ ] dysuria [ ] frequency [ ] hematuria [ ] discharge [ ] flank pain  [ ] incontinence  Musculoskeletal:  [ ] myalgias [ ] arthralgias [ ] arthritis  [ ] back pain  Neurological:  [ ] headache [ ] seizures  [ ] confusion/altered mental status    Allergies  No Known Allergies        ANTIMICROBIALS:  trimethoprim   80 mG/sulfamethoxazole 400 mG 1 daily  valGANciclovir 450 <User Schedule>      OTHER MEDS:  MEDICATIONS  (STANDING):  amLODIPine   Tablet 10 daily  doxazosin 4 <User Schedule>  epoetin cortney-epbx (RETACRIT) Injectable 99149 every 7 days  finasteride 5 daily  fosphenytoin IVPB 200 every 12 hours  hydrALAZINE 25 every 8 hours  insulin regular  human recombinant. 3 once  insulin regular Infusion 3 <Continuous>  levothyroxine 50 daily  melatonin 6 at bedtime  mycophenolate mofetil 250 <User Schedule>  pantoprazole    Tablet 40 before breakfast  polyethylene glycol 3350 17 daily  predniSONE   Tablet 20 daily  senna 2 at bedtime  sirolimus 2.5 <User Schedule>      Vital Signs Last 24 Hrs  T(C): 36.8 (08 Aug 2022 15:00), Max: 38.3 (08 Aug 2022 02:50)  T(F): 98.2 (08 Aug 2022 15:00), Max: 100.9 (08 Aug 2022 02:50)  HR: 56 (08 Aug 2022 17:45) (53 - 62)  BP: 168/72 (08 Aug 2022 17:45) (128/60 - 183/76)  BP(mean): 104 (08 Aug 2022 17:00) (86 - 118)  RR: 23 (08 Aug 2022 17:45) (12 - 23)  SpO2: 100% (08 Aug 2022 17:45) (96% - 100%)    Parameters below as of 08 Aug 2022 17:45  Patient On (Oxygen Delivery Method): room air      PHYSICAL EXAMINATION:  General: Alert and Awake, NAD  Cardiac: RRR, No M/R/G  Resp: CTAB, No Wh/Rh/Ra  Abdomen: RLQ with erythema and induration overlying the renal transplant site. induration with some improvement but some extension into the groin.   MSK: No LE edema. No Calf tenderness  Skin: No rashes or lesions. Skin is warm and dry to the touch.   Neuro: Alert and Awake. CN 2-12 Grossly intact. Moves all four extremities spontaneously.  Psych: Calm, Pleasant, Cooperative                            8.7    19.54 )-----------( 361      ( 08 Aug 2022 05:42 )             26.8       08-08    129<L>  |  90<L>  |  76<H>  ----------------------------<  275<H>  5.0   |  22  |  4.70<H>    Ca    8.4      08 Aug 2022 17:19  Phos  5.1     08-08  Mg     2.2     08-08    TPro  5.7<L>  /  Alb  2.1<L>  /  TBili  0.2  /  DBili  0.1  /  AST  18  /  ALT  15  /  AlkPhos  278<H>  08-08          MICROBIOLOGY:  Vancomycin Level, Random: 13.5 ug/mL (08-08-22 @ 05:42)  v  .Blood Blood  08-03-22   No growth to date.  --  --      .Blood Blood-Peripheral  07-30-22   No Growth Final  --  --      .Blood Blood-Peripheral  07-30-22   No Growth Final  --  --      .Blood Blood  07-27-22   No growth  --  --      Catheterized Catheterized  07-25-22   50,000 - 99,000 CFU/mL Klebsiella pneumoniae ESBL  --  Klebsiella pneumoniae ESBL      .Blood Blood  07-25-22   No Growth Final  --  --      .Blood Blood  07-25-22   No Growth Final  --  --      .Blood Blood  07-15-22   No Growth Final  --  --      .Blood Blood  07-15-22   No Growth Final  --  --      Clean Catch Clean Catch (Midstream)  07-13-22   50,000 - 99,000 CFU/mL Klebsiella pneumoniae ESBL  --  Klebsiella pneumoniae ESBL                RADIOLOGY:    <The imaging below has been reviewed and visualized by me independently. Findings as detailed in report below>    < from: US Trans Kidney w/ Doppler, Right (08.05.22 @ 17:26) >  IMPRESSION:    Patent renal artery vasculature.. Stable appearance the transplant kidney.    Persistent elevated resistive indices    Diffuse abdominal wall edematous changes right lower quadrant without   focal collection    < end of copied text >

## 2022-08-08 NOTE — CONSULT NOTE ADULT - ASSESSMENT
#Skin plaque, necrotizing fascitis v cellulitis  - Necrotizing fasciitis is a deep and often devastating bacterial infection that tracks along fascial planes and expands well beyond any outward cutaneous signs of infection (eg, erythema). It may be classified as polymicrobial (type 1) or monomicrobial (type 2). Type 1 infections are caused by aerobic and anaerobic organisms and generally affect hosts who are immunocompromised, those with underlying illness (such as diabetes mellitus), and elderly patients. Type 2 infections are most commonly caused by Streptococcus pyogenes, although they can be caused by methicillin-resistant Staphylococcus aureus (MRSA); they can occur in healthy individuals with no past medical history.  - Consider repeat imaging  - Favor an infectious etiology, in the setting fo immunosuppression -- patient has already been covered on broad spectrum abx  - Will perform H&E and tissue culture for bacterial/fungal/AFB biopsies today     Performed skin biopsy x 2 for H&E and tissue culture as below.    Dermatology Punch Biopsy Procedure Note  -After risks and benefits of procedure including bleeding, infection and scar were reviewed (consents including photo consent reviewed, signed and in chart), allergies were reviewed and time out performed.     -Area cleaned with rubbing alcohol and anesthetized with lidocaine and epinephrine.  A 4mm punch biopsy x 2 was performed to Right abdomen, hemostasis achieved with 4-0 Chromic gut sutures. Dressing with Vaseline. Wound care reviewed with patient and team.  -Sutures are dissolvable, no need for removal. Please leave dressing on for 24-48 hours and after that please apply vaseline on the biopsy site 2-3 times daily to keep area moist and promote healing.    The patient's chart was reviewed in addition to being seen and examined at bedside with the dermatology attending (javier).  Recommendations were communicated with the primary team.  Please page [phone] with a [phone] call-back number for further related questions.    Heri Guan MD  Resident Physician, PGY-3  [facility]  Pager: [phone]  Office: [phone]

## 2022-08-08 NOTE — CONSULT NOTE ADULT - SUBJECTIVE AND OBJECTIVE BOX
HPI:  67 y/o M with a PMH of T2DM, ESRD ( HD via R. AV fistula since ), HTN, CAD s/p CABG , A. Fib ( On ), CVA (  A. Fib), Seizure d/o after CVA in , GIB ( 2022) 2/2 duodenal ulcer, and ESRD s/p renal transplant on 22 c/b Delayed Graft Function requiring HD & large perinephric hematoma  s/p evacuation w/ revision of arterial anastomosis. Pt presented from rehab on 6/10/22 2/ status epilepticus requiring intubation for airway protection with a hospital course c/b large right pleural effusion s/p thoracentesis c/b hemothorax while being anticoagulated & requiring intubation for airway protectio. Pt transferred to SICU s/p VATS with a second chest tube placed. Pt transferred to floor 7/10 and readmitted to SICU  for AMS, found to have ESBL klebsiella UTI, ANA, with renal rejection, and renal failure requiring HD. Course further c/b 3 focal seizures s/p EEG and antiepileptic therapy with Fosphenytoin Pt remains in SICU for further hemodynamic monitoring and management.     Dermatololgy HPI:  Dermatology consulted for concern for erythema around the transplant incision site. Patient came in for AMS management and his hospital course has been c/b  transplant pyelonephritis.    This redness was first noticed 1 week ago and has been expanding despite antiobiotics ertapenum and vancomycin. For immunosuppression, patient is currently on sirolimus, cellcept, and prednisone. Of note, patient is with fevers and leukocytosis without a known cause.  Patient recently is s/p a course of ertapenum and is currently on vanc.       PAST MEDICAL & SURGICAL HISTORY:  Hypertension      Diabetes      Dyslipidemia      CAD (Coronary Artery Disease)  with Stents in 2009 and 2019, s/p off-pump C3L on 20      Hypothyroidism      CVA (cerebral vascular accident)  19 with residual bilateral weakness      Anemia      PEG (percutaneous endoscopic gastrostomy) status  removed 2020      Intubation of airway performed without difficulty  Dec 2019  CVA c/b status epilepticus requiring intubation and PEG in 2019-      ESRD on dialysis  M/W/F      Seizures  after CVA, last seizure was last week 22      History of insertion of stent into coronary artery bypass graft  2009 and 2019      S/P CABG x 3  off pump C3L on 20          Review of Systems:  REVIEW OF SYSTEMS      General: no fevers/chills, no lethary	    Skin/Breast: see HPI  	  Ophthalmologic: no eye pain or change in vision  	  ENMT: no dysphagia or change in hearing    Respiratory and Thorax: no SOB or cough  	  Cardiovascular: no palpitations or chest pain    Gastrointestinal: no abdomenal pain or blood in stool     Genitourinary: no dysuria or frequency    Musculoskeletal: no joint pains or weakness	    Neurological:no weakness, numbness , or tingling    MEDICATIONS  (STANDING):  amLODIPine   Tablet 10 milliGRAM(s) Oral daily  chlorhexidine 2% Cloths 1 Application(s) Topical <User Schedule>  chlorhexidine 4% Liquid 1 Application(s) Topical <User Schedule>  doxazosin 4 milliGRAM(s) Oral <User Schedule>  epoetin cortney-epbx (RETACRIT) Injectable 73510 Unit(s) SubCutaneous every 7 days  finasteride 5 milliGRAM(s) Oral daily  fosphenytoin IVPB 200 milliGRAM(s) PE IV Intermittent every 12 hours  hydrALAZINE 25 milliGRAM(s) Oral every 8 hours  insulin glargine Injectable (LANTUS) 5 Unit(s) SubCutaneous <User Schedule>  insulin lispro (ADMELOG) corrective regimen sliding scale   SubCutaneous three times a day before meals  insulin lispro (ADMELOG) corrective regimen sliding scale   SubCutaneous at bedtime  insulin lispro Injectable (ADMELOG) 3 Unit(s) SubCutaneous three times a day before meals  levothyroxine 50 MICROGram(s) Oral daily  melatonin 6 milliGRAM(s) Oral at bedtime  mycophenolate mofetil 250 milliGRAM(s) Oral <User Schedule>  pantoprazole    Tablet 40 milliGRAM(s) Oral before breakfast  polyethylene glycol 3350 17 Gram(s) Oral daily  predniSONE   Tablet 20 milliGRAM(s) Oral daily  senna 2 Tablet(s) Oral at bedtime  sirolimus 2.5 milliGRAM(s) Oral <User Schedule>  trimethoprim   80 mG/sulfamethoxazole 400 mG 1 Tablet(s) Oral daily  valGANciclovir 450 milliGRAM(s) Oral <User Schedule>    ALLERGIES: No Known Allergies        SOCIAL HISTORY:  son is an interventional cardiologist at [employer]  Social History:  Lives at [facility]  FAMILY HISTORY:  Family history of cancer of tongue (Father)  Parents are  . Father - at 80 years, tongue cancer was a smoker. Mother- at 71, had accident while crossing road. Siblings- 2 brothers and one sister.          VITAL SIGNS LAST 24 HOURS:  T(F): 98.2 ( @ 15:00), Max: 100.9 ( @ 02:50)  HR: 55 ( @ 17:00) (53 - 62)  BP: 163/72 ( @ 17:00) (128/60 - 183/84)  RR: 14 ( @ 17:00) (12 - 22)    PHYSICAL EXAM:     The patient was alert and oriented X 3, well nourished, and in no  apparent distress.  OP showed no ulcerations  There was no visible lymphadenopathy.  Conjunctiva were non injected  There was no clubbing or edema of extremities.  The scalp, hair, face, eyebrows, lips, OP, neck, chest, back,   extremities X 4, nails were examined.  There was no hyperhidrosis or bromhidrosis.    Of note on skin exam:     indurated well-demarcated plaque erythema on the proximal R thigh, R abdomen  swollen, edematous scrotum and penis  edematous extremities    LABS:                        8.7    19.54 )-----------( 361      ( 08 Aug 2022 05:42 )             26.8     08-    131<L>  |  92<L>  |  72<H>  ----------------------------<  149<H>  4.9   |  22  |  4.58<H>    Ca    8.3<L>      08 Aug 2022 05:42  Phos  4.8     08-  Mg     2.2     08-08    TPro  5.7<L>  /  Alb  2.1<L>  /  TBili  0.2  /  DBili  0.1  /  AST  18  /  ALT  15  /  AlkPhos  278<H>      PT/INR - ( 08 Aug 2022 05:42 )   PT: 13.5 sec;   INR: 1.16 ratio         PTT - ( 08 Aug 2022 05:42 )  PTT:30.7 sec

## 2022-08-08 NOTE — PROCEDURE NOTE - NSPOSTPRCRAD_GEN_A_CORE
central line located in the superior vena cava/depth of insertion/line adjusted to depth of insertion/no pneumothorax/post-procedure radiography performed
central line located in the superior vena cava/line adjusted to depth of insertion
chest radiograph/feeding tube in correct intestinal position
no pneumothorax/post-procedure radiography performed
chest tube in correct position

## 2022-08-08 NOTE — PROGRESS NOTE ADULT - SUBJECTIVE AND OBJECTIVE BOX
DIABETES FOLLOW UP NOTE: Saw pt earlier today    Chief Complaint: Endocrine consult requested for management of T2DM    INTERVAL HX:      Review of Systems:  General: As above  Cardiovascular: No chest pain, palpitations  Respiratory: No SOB, no cough  GI: No nausea, vomiting, abdominal pain  Endocrine: no polyuria, polydipsia or S&Sx of hypoglycemia    Allergies    No Known Allergies    Intolerances      MEDICATIONS  (STANDING):  amLODIPine   Tablet 10 milliGRAM(s) Oral daily  chlorhexidine 2% Cloths 1 Application(s) Topical <User Schedule>  doxazosin 4 milliGRAM(s) Oral <User Schedule>  epoetin cortney-epbx (RETACRIT) Injectable 18097 Unit(s) SubCutaneous every 7 days  finasteride 5 milliGRAM(s) Oral daily  fosphenytoin IVPB 200 milliGRAM(s) PE IV Intermittent every 12 hours  hydrALAZINE 25 milliGRAM(s) Oral every 8 hours  insulin regular  human recombinant. 3 Unit(s) IV Push once  insulin regular Infusion 3 Unit(s)/Hr (3 mL/Hr) IV Continuous <Continuous>  levothyroxine 50 MICROGram(s) Oral daily  melatonin 6 milliGRAM(s) Oral at bedtime  mycophenolate mofetil 250 milliGRAM(s) Oral <User Schedule>  pantoprazole    Tablet 40 milliGRAM(s) Oral before breakfast  polyethylene glycol 3350 17 Gram(s) Oral daily  predniSONE   Tablet 20 milliGRAM(s) Oral daily  senna 2 Tablet(s) Oral at bedtime  sirolimus 2.5 milliGRAM(s) Oral <User Schedule>  trimethoprim   80 mG/sulfamethoxazole 400 mG 1 Tablet(s) Oral daily  valGANciclovir 450 milliGRAM(s) Oral <User Schedule>      PHYSICAL EXAM:  VITALS: T(C): 36.8 (08-08-22 @ 15:00)  T(F): 98.2 (08-08-22 @ 15:00), Max: 100.9 (08-08-22 @ 02:50)  HR: 55 (08-08-22 @ 17:00) (53 - 62)  BP: 163/72 (08-08-22 @ 17:00) (128/60 - 183/84)  RR:  (12 - 22)  SpO2:  (96% - 100%)  Wt(kg): --  GENERAL: in NAD  Abdomen: Soft, nontender, non distended  Extremities: Warm, no edema in all 4 exts  NEURO: A&O X3    LABS:  POCT Blood Glucose.: 259 mg/dL (08-08-22 @ 17:28)  POCT Blood Glucose.: 269 mg/dL (08-08-22 @ 13:54)  POCT Blood Glucose.: 145 mg/dL (08-08-22 @ 08:17)  POCT Blood Glucose.: 151 mg/dL (08-08-22 @ 05:32)  POCT Blood Glucose.: 230 mg/dL (08-07-22 @ 22:19)  POCT Blood Glucose.: 279 mg/dL (08-07-22 @ 16:46)  POCT Blood Glucose.: 227 mg/dL (08-07-22 @ 12:05)  POCT Blood Glucose.: 172 mg/dL (08-07-22 @ 05:22)  POCT Blood Glucose.: 162 mg/dL (08-06-22 @ 17:45)  POCT Blood Glucose.: 180 mg/dL (08-06-22 @ 11:06)  POCT Blood Glucose.: 181 mg/dL (08-06-22 @ 00:39)                            8.7    19.54 )-----------( 361      ( 08 Aug 2022 05:42 )             26.8       08-08    129<L>  |  90<L>  |  76<H>  ----------------------------<  275<H>  5.0   |  22  |  4.70<H>    eGFR: 13<L>    Ca    8.4      08-08  Mg     2.2     08-08  Phos  5.1     08-08    TPro  5.7<L>  /  Alb  2.1<L>  /  TBili  0.2  /  DBili  0.1  /  AST  18  /  ALT  15  /  AlkPhos  278<H>  08-08      eGFR: 13 mL/min/1.73m2 (08-08-22 @ 17:19)  eGFR: 13 mL/min/1.73m2 (08-08-22 @ 05:42)        Thyroid Function Tests:      A1C with Estimated Average Glucose Result: 6.0 % (06-30-22 @ 05:49)      Estimated Average Glucose: 126 mg/dL (06-30-22 @ 05:49)        07-27 Chol -- Direct LDL -- LDL calculated -- HDL -- Trig 118               DIABETES FOLLOW UP NOTE: Saw pt earlier today    Chief Complaint: Endocrine consult requested for management of T2DM    INTERVAL HX: Saw pt in ICU> S/p HD cath insertion. Pt states not feeling well but unable to specify how he feels. Denies any pain/CP/SOB. Per RN, pt tolerating POs and starting insulin drip for BG >250s. Per EMR pt didn't get any SQ insulin today for breakfast or lunch but per RN, she administered  6 units correction Admelog plus 3 units premeal insulin for lunch with BG remaining in mid 200s. Pt on Prednisone 20mg daily. No hypoglycemia but noted persistent prandial hyperglycemia yesterday and today. Per priam team will c/w insulin drip for now.       Review of Systems:  General: As above  Cardiovascular: No chest pain, palpitations  Respiratory: No SOB, no cough  GI: No nausea, vomiting, abdominal pain  Endocrine: no polyuria, polydipsia or S&Sx of hypoglycemia    Allergies    No Known Allergies    Intolerances      MEDICATIONS:  insulin regular  human recombinant. 3 Unit(s) IV Push once  insulin regular Infusion 3 Unit(s)/Hr (3 mL/Hr) IV Continuous <Continuous>  levothyroxine 50 MICROGram(s) Oral daily  predniSONE   Tablet 20 milliGRAM(s) Oral daily  sirolimus 2.5 milliGRAM(s) Oral <User Schedule>  trimethoprim   80 mG/sulfamethoxazole 400 mG 1 Tablet(s) Oral daily  valGANciclovir 450 milliGRAM(s) Oral <User Schedule>      PHYSICAL EXAM:  VITALS: T(C): 36.8 (08-08-22 @ 15:00)  T(F): 98.2 (08-08-22 @ 15:00), Max: 100.9 (08-08-22 @ 02:50)  HR: 55 (08-08-22 @ 17:00) (53 - 62)  BP: 163/72 (08-08-22 @ 17:00) (128/60 - 183/84)  RR:  (12 - 22)  SpO2:  (96% - 100%)  Wt(kg): --  GENERAL: Male laying in bed in NAD  Abdomen: Soft, nontender, non distended  Extremities: Warm, trace edema in  LEs. LUE + edema  NEURO: Alert and able to answer simple questions but can't elaborate    LABS:  POCT Blood Glucose.: 259 mg/dL (08-08-22 @ 17:28)  POCT Blood Glucose.: 269 mg/dL (08-08-22 @ 13:54)  POCT Blood Glucose.: 145 mg/dL (08-08-22 @ 08:17)  POCT Blood Glucose.: 151 mg/dL (08-08-22 @ 05:32)  POCT Blood Glucose.: 230 mg/dL (08-07-22 @ 22:19)  POCT Blood Glucose.: 279 mg/dL (08-07-22 @ 16:46)  POCT Blood Glucose.: 227 mg/dL (08-07-22 @ 12:05)  POCT Blood Glucose.: 172 mg/dL (08-07-22 @ 05:22)  POCT Blood Glucose.: 162 mg/dL (08-06-22 @ 17:45)  POCT Blood Glucose.: 180 mg/dL (08-06-22 @ 11:06)  POCT Blood Glucose.: 181 mg/dL (08-06-22 @ 00:39)                            8.7    19.54 )-----------( 361      ( 08 Aug 2022 05:42 )             26.8       08-08    129<L>  |  90<L>  |  76<H>  ----------------------------<  275<H>  5.0   |  22  |  4.70<H>    eGFR: 13<L>    Ca    8.4      08-08  Mg     2.2     08-08  Phos  5.1     08-08    TPro  5.7<L>  /  Alb  2.1<L>  /  TBili  0.2  /  DBili  0.1  /  AST  18  /  ALT  15  /  AlkPhos  278<H>  08-08      A1C with Estimated Average Glucose Result: 6.0 % (06-30-22 @ 05:49)      Estimated Average Glucose: 126 mg/dL (06-30-22 @ 05:49)        07-27 Chol -- Direct LDL -- LDL calculated -- HDL -- Trig 118

## 2022-08-08 NOTE — CONSULT NOTE ADULT - ATTENDING COMMENTS
Focal area of erythema and induration overlying allograft surgical site. 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 All clinical and radiological signs point towards cellulitis, supported by worsening leukocytosis and fevers beginning 8/5/22 (around the time of onset of erythema). Although staph and strep are the most likely culprits,  in a highly immunosuppressed patient would consider other organisms including atypical mycobacteria and fungal. Overall, he appears to be stable on vancomycin, and fever curve, leukocytosis and induration all seem to have improved. Low suspicion for necrotising fasciitis given absence of gas on imaging, hemodynamically stable staus and only mild to moderate pain/tenderness on exam. Could consider consulting with radiology for utility of MRI given intramuscular collection, although suspicion for a pyomyositis is also low.     We have performed a skin biopsy for H&E and tissue cultures, however these may be unrevealing as cellulitis is often non-specific on biopsy and cultures may be negative under the setting of antibiotics. Strong consideration for sending tissue out for bacterial, mycobacterial and fungal PCR within the next few days if preliminary skin biosy and tissue cultures are unrevealing.

## 2022-08-08 NOTE — PROGRESS NOTE ADULT - ATTENDING COMMENTS
Mental status stable  Check duplex to r/o DVT  derm consult for skin erythema  Cont vanco  Dialysis for volume removal.

## 2022-08-08 NOTE — PROGRESS NOTE ADULT - SUBJECTIVE AND OBJECTIVE BOX
[facility] DIVISION OF KIDNEY DISEASES AND HYPERTENSION   FOLLOW UP NOTE    --------------------------------------------------------------------------------  Chief Complaint: s/p DDRT now with grade 2a rejection and HD dependant    24 hour events/subjective: Pt. was seen and examined today. Overnight events noted. Pt alert, awake and following simple commands.     PAST HISTORY  --------------------------------------------------------------------------------  No significant changes to PMH, PSH, FHx, SHx, unless otherwise noted    ALLERGIES & MEDICATIONS  --------------------------------------------------------------------------------  Allergies    No Known Allergies    Intolerances      Standing Inpatient Medications  amLODIPine   Tablet 10 milliGRAM(s) Oral daily  chlorhexidine 2% Cloths 1 Application(s) Topical <User Schedule>  doxazosin 4 milliGRAM(s) Oral <User Schedule>  epoetin cortney-epbx (RETACRIT) Injectable 23442 Unit(s) SubCutaneous every 7 days  finasteride 5 milliGRAM(s) Oral daily  fosphenytoin IVPB 200 milliGRAM(s) PE IV Intermittent every 12 hours  hydrALAZINE 25 milliGRAM(s) Oral every 8 hours  insulin glargine Injectable (LANTUS) 5 Unit(s) SubCutaneous <User Schedule>  insulin lispro (ADMELOG) corrective regimen sliding scale   SubCutaneous three times a day before meals  insulin regular  human corrective regimen sliding scale   SubCutaneous at bedtime  levothyroxine Injectable 37.5 MICROGram(s) IV Push at bedtime  melatonin 6 milliGRAM(s) Oral at bedtime  mycophenolate mofetil 250 milliGRAM(s) Oral <User Schedule>  pantoprazole    Tablet 40 milliGRAM(s) Oral before breakfast  polyethylene glycol 3350 17 Gram(s) Oral daily  predniSONE   Tablet 20 milliGRAM(s) Oral daily  senna 2 Tablet(s) Oral at bedtime  sirolimus 2.5 milliGRAM(s) Oral <User Schedule>  trimethoprim   80 mG/sulfamethoxazole 400 mG 1 Tablet(s) Oral daily  valGANciclovir 450 milliGRAM(s) Oral <User Schedule>    PRN Inpatient Medications      REVIEW OF SYSTEMS  --------------------------------------------------------------------------------  Limited ROS    VITALS/PHYSICAL EXAM  --------------------------------------------------------------------------------  T(C): 36.5 (08-08-22 @ 07:00), Max: 38.3 (08-08-22 @ 02:50)  HR: 57 (08-08-22 @ 10:00) (53 - 62)  BP: 142/65 (08-08-22 @ 10:00) (128/60 - 187/74)  RR: 13 (08-08-22 @ 10:00) (13 - 22)  SpO2: 97% (08-08-22 @ 10:00) (96% - 100%)  Wt(kg): --      08-07-22 @ 07:01  -  08-08-22 @ 07:00  --------------------------------------------------------  IN: 714 mL / OUT: 30 mL / NET: 684 mL    08-08-22 @ 07:01  -  08-08-22 @ 10:21  --------------------------------------------------------  IN: 120 mL / OUT: 0 mL / NET: 120 mL        Physical Exam:  	Gen: weak appearing elderly male  	HEENT: Anicteric  	Pulm: CTA B/L  	CV: S1S2+  	Abd: Soft, +BS               Transplant site: RLQ non tender, surrounding erythema over the transplant site+  	Ext: LE edema B/L++  	Neuro: Awake  	Skin: Warm and dry  	Dialysis access: Left non tunneled IJ catheter+, site clear, no bleeding noted      LABS/STUDIES  --------------------------------------------------------------------------------              8.7    19.54 >-----------<  361      [08-08-22 @ 05:42]              26.8     131  |  92  |  72  ----------------------------<  149      [08-08-22 @ 05:42]  4.9   |  22  |  4.58        Ca     8.3     [08-08-22 @ 05:42]      Mg     2.2     [08-08-22 @ 05:42]      Phos  4.8     [08-08-22 @ 05:42]    Creatinine Trend:  SCr 4.58 [08-08 @ 05:42]  SCr 3.98 [08-07 @ 17:20]  SCr 3.68 [08-07 @ 06:05]  SCr 3.91 [08-06 @ 05:37]  SCr 3.29 [08-05 @ 17:52]    Urinalysis - [07-25-22 @ 18:28]      Color Yellow / Appearance Slightly Turbid / SG 1.020 / pH 5.5      Gluc Negative / Ketone Negative  / Bili Negative / Urobili Negative       Blood Small / Protein 100 / Leuk Est Large / Nitrite Negative      RBC 2 / WBC 40 / Hyaline 5 / Gran  / Sq Epi  / Non Sq Epi 0 / Bacteria Many    SirolimusSirolimus (Rapamycin) Level, Serum: 3.5 ng/mL (08-07 @ 06:05)  Sirolimus (Rapamycin) Level, Serum: 3.4 ng/mL (08-06 @ 05:37)  Sirolimus (Rapamycin) Level, Serum: 3.4 ng/mL (08-05 @ 04:17)  Sirolimus (Rapamycin) Level, Serum: 5.0 ng/mL (08-03 @ 22:24)  Sirolimus (Rapamycin) Level, Serum: 6.1 ng/mL (08-02 @ 23:18)  Sirolimus (Rapamycin) Level, Serum: 6.7 ng/mL (08-01 @ 23:41)    CMV PCRCMVPCR Log: NotDetec Xzg31DQ/mL (07-22 @ 06:18)  CMVPCR Log: NotDetec Pfn07VO/mL (06-26 @ 18:14)  CMVPCR Log: NotDetec Vqt78NV/mL (06-25 @ 14:08)    Parvo PCRParvovirus B19 DNA by PCR: NotDetec IU/mL (07-25 @ 18:32)  Parvovirus B19 DNA by PCR: NotDetec IU/mL (06-26 @ 18:14)

## 2022-08-08 NOTE — PROCEDURE NOTE - NSINDICATIONS_GEN_A_CORE
monitoring purposes
monitoring purposes
venous access
hemothorax
dialysis/CRRT
dialysis/CRRT
venous access
pleural effusion
respiratory distress
CSF sampling/suspected CNS infection

## 2022-08-08 NOTE — PROGRESS NOTE ADULT - ASSESSMENT
Patient is a 67 y/o male w/ a PMHx of CAD s/p CABG, AF on Eliquis c/b CVA c/b seizures, HTN, HLD, DM type II, hypothyroidism, GI bleed due to a duodenal ulcer, and ESRD s/p DDRT 4/21/22 c/b DGF requiring HD & large perinephric hematoma 5/22 s/p evacuation w/ revision of arterial anastomosis who presented in status epilepticus 6/10 from rehab requiring intubation for airway protection with a hospital course c/b large right pleural effusion s/p thoracentesis c/b hemothorax while being anticoagulated & requiring intubation for airway protection s/p chest tube placement w/ CT demonstrating retained hemothorax s/p right VATS, status epilepticus again causing AMS requiring intubation for airway protection again, delirium, dysphagia requiring NGT placement for enteral access, hyperkalemia, and poor glycemic control. 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     Neuro: rencephalopathy improved, hx of seizure, with refractory seizure  - c/w Fosphenytoin 200mg IV BID  - S/p Fycompa, previously successful antiepileptic therapy with notable psych changes   - Multimodal pain control w/Tylenol  - Melatonin with Protected sleep to improve delirium  - CT head (08/04): Negative for  acute hemorrhage  - Neurology following, recs appreciated     Resp: prevent atelectasis  - Maintain SpO2 >92%   - Out of bed to chair, ambulate as tolerated, and incentive spirometry to prevent atelectasis    CVS: hx of HTN, s/p CABG, AF on Eliquis  - Maintain MAP > 65  - Continue Hydralazine 25mg q8h and Amlodipine 10mg qd   -carvedilol on hold 2/2 bradycardia  - lactate clearted    GI: transaminitis improved  - d/c'd Luis Alberto feeding tube, and passed swallow on reg diet soft bite sized diet  - Protonix 40mg IV daily for ppx   - Bowel regimen with Miralax,  senna     Renal: DDRT 4/21 cb DGF, now c/b acute rejection s/p IR renal bx 7/29  - Pt Anuric, s/p HD 2L removed ( 8/6)   - left EJ HD cath, exchange by IR tomorrow, as it reports low flow rate during HD  - Doxazosin + Proscar  - Continue immunosuppression with sirolimus 2.5mg bid , cellcept 250mg bid prednisone 20mg     Heme:  - Monitor CBC and coags  - SCDs for mechanical VTE ppx   -lovenox held / AC on hold    ID: ESBL klebsiella UTI (7/15 + UCx, 7/25 +UCx)  - Blood cx negative 7/25, 7/27, 7/30  - F/u Repeat blood cx ( 08/04) with onset of seziures   - D/c Ertapenem and Fluconazole due to lower seizure threshold  -c/w Vanco by level due to leukocytosis with surrounding erythema of Right groin incision per Transplant ID   -monitor wbc, temp      Endo: hx of T2DM, Hypothyroidism,   - Hypoglyemic episode to 50 ( 8/4) d/c Lantus and Premeal, as fS jumping up again lantus 5units ordered for bedtime  - Continue Home Synthroid 37.5mg ivp    Code status: Full Code   Dispo: SICU        Patient is a 67 y/o male w/ a PMHx of CAD s/p CABG, AF on Eliquis c/b CVA c/b seizures, HTN, HLD, DM type II, hypothyroidism, GI bleed due to a duodenal ulcer, and ESRD s/p DDRT 4/21/22 c/b DGF requiring HD & large perinephric hematoma 5/22 s/p evacuation w/ revision of arterial anastomosis who presented in status epilepticus 6/10 from rehab requiring intubation for airway protection with a hospital course c/b large right pleural effusion s/p thoracentesis c/b hemothorax while being anticoagulated & requiring intubation for airway protection s/p chest tube placement w/ CT demonstrating retained hemothorax s/p right VATS, status epilepticus again causing AMS requiring intubation for airway protection again, delirium, dysphagia requiring NGT placement for enteral access, hyperkalemia, and poor glycemic control. 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     Neuro: rencephalopathy improved, hx of seizure, with refractory seizure  - c/w Fosphenytoin 200mg IV BID  - S/p Fycompa, previously successful antiepileptic therapy with notable psych changes   - Multimodal pain control w/Tylenol  - Melatonin with Protected sleep to improve delirium  - CT head (08/04): Negative for  acute hemorrhage  - Neurology following, recs appreciated     Resp: prevent atelectasis  - Maintain SpO2 >92%   - Out of bed to chair, ambulate as tolerated, and incentive spirometry to prevent atelectasis    CVS: hx of HTN, s/p CABG, AF on Eliquis  - Maintain MAP > 65  - Continue Hydralazine 25mg q8h and Amlodipine 10mg qd   -carvedilol on hold 2/2 bradycardia  - lactate clearted    GI: transaminitis improved  - d/c'd Luis Alberto feeding tube, and passed swallow on reg diet soft bite sized diet  - Protonix 40mg IV daily for ppx   - Bowel regimen with Miralax,  senna     Renal: DDRT 4/21 cb DGF, now c/b acute rejection s/p IR renal bx 7/29  - Pt Anuric, s/p HD 2L removed ( 8/6)   - left EJ HD cath, exchange by IR tomorrow, as it reports low flow rate during HD  - Doxazosin + Proscar  - Continue immunosuppression with sirolimus 2.5mg bid , cellcept 250mg bid prednisone 20mg     Heme:  - Monitor CBC and coags  - SCDs for mechanical VTE ppx   -lovenox held / AC on hold    ID: ESBL klebsiella UTI (7/15 + UCx, 7/25 +UCx)  - Blood cx negative 7/25, 7/27, 7/30  - F/u Repeat blood cx ( 08/04) with onset of seziures   - D/c Ertapenem and Fluconazole due to lower seizure threshold  -c/w Vanco by level due to leukocytosis with surrounding erythema of Right groin incision per Transplant ID   -ON febrile. Abimael blood cultures and UA. Administered tylenol.       Endo: hx of T2DM, Hypothyroidism,   - Hypoglyemic episode to 50 ( 8/4) d/c Lantus and Premeal, as fS jumping up again lantus 5units ordered for bedtime  - Continue Home Synthroid 37.5mg ivp    Code status: Full Code   Dispo: SICU        Patient is a 65 y/o male w/ a PMHx of CAD s/p CABG, AF on Eliquis c/b CVA c/b seizures, HTN, HLD, DM type II, hypothyroidism, GI bleed due to a duodenal ulcer, and ESRD s/p DDRT 4/21/22 c/b DGF requiring HD & large perinephric hematoma 5/22 s/p evacuation w/ revision of arterial anastomosis who presented in status epilepticus 6/10 from rehab requiring intubation for airway protection with a hospital course c/b large right pleural effusion s/p thoracentesis c/b hemothorax while being anticoagulated & requiring intubation for airway protection s/p chest tube placement w/ CT demonstrating retained hemothorax s/p right VATS, status epilepticus again causing AMS requiring intubation for airway protection again, delirium, dysphagia requiring NGT placement for enteral access, hyperkalemia, and poor glycemic control. 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     Neuro: rencephalopathy improved, hx of seizure, with refractory seizure  - c/w Fosphenytoin 200mg IV BID  - S/p Fycompa, previously successful antiepileptic therapy with notable psych changes   - Multimodal pain control w/Tylenol  - Melatonin with Protected sleep to improve delirium  - CT head (08/04): Negative for  acute hemorrhage  - Neurology following, recs appreciated     Resp: prevent atelectasis  - Maintain SpO2 >92%   - Out of bed to chair, ambulate as tolerated, and incentive spirometry to prevent atelectasis    CVS: hx of HTN, s/p CABG, AF on Eliquis  - Maintain MAP > 65  - Continue Hydralazine 25mg q8h and Amlodipine 10mg qd   -carvedilol on hold 2/2 bradycardia  - lactate clearted    GI: transaminitis improved  - passed swallow, non soft and bite sized diet  - Protonix 40mg IV daily for ppx   - Bowel regimen with Miralax,  senna     Renal: DDRT 4/21 cb DGF, now c/b acute rejection s/p IR renal bx 7/29  - Pt Anuric, s/p HD 2L removed ( 8/6)   - left EJ HD cath, exchange by IR today, as it reports low flow rate during HD  - Doxazosin + Proscar  - Continue immunosuppression with sirolimus 2.5mg bid , cellcept 250mg bid prednisone 20mg     Heme:  - Monitor CBC and coags  - SCDs for mechanical VTE ppx   -lovenox held / AC on hold    ID: ESBL klebsiella UTI (7/15 + UCx, 7/25 +UCx)  - Blood cx negative 7/25, 7/27, 7/30  - F/u Repeat blood cx ( 08/04) with onset of seziures   - D/c Ertapenem and Fluconazole due to lower seizure threshold  -c/w Vanco by level due to leukocytosis with surrounding erythema of Right groin incision per Transplant ID   -ON febrile. Abimael blood cultures and UA. Administered tylenol.       Endo: hx of T2DM, Hypothyroidism,   - Hypoglyemic episode to 50 ( 8/4) d/c Lantus and Premeal, as fS jumping up again lantus 5units ordered for bedtime  - Continue Home Synthroid 37.5mg ivp    Code status: Full Code   Dispo: SICU

## 2022-08-08 NOTE — PROGRESS NOTE ADULT - ASSESSMENT
66M with h/o DM type II, HTN, CAD s/p CABG in 2020, Afib on Eliquis, CVA in 2019 due to Afib, Seizure d/o (last episode was on 4/8/22), h/o GI bleed in 2/2022 EGD with duodenal ulcer and ESRD on HD s/p R DDRT from DCD donor on 4/21/22 complicated by DGF requiring HD until 4/29/22 who presented with status epilepticus in setting of UTI/antibiotics and sub-therapeutic valproic acid level. Transferred to SICU on 7/25 with signs of sepsis, now with refractory seizures    Seizure Disorder:  - refractory seizures o/n.  Neuro/Epilepsy Teams following. Follow recommendations closely. continue EEG. Avoid Fycompa 2/2 agitation in past  - prior MRI head 6/27 with less concerns for PRES, likely ischemic changes  - o/n CTH 8/3 with no acute findings  - replete lytes aggressively  -FU fosphenytoin level    R DCD DDRT 4/21/22 now with 2A ACR  - 2A ACR: s/p pulse steroids, now on 20mg QD will keep   - will continue HD daily per renal given persistent fluid overload and anuria  - On vanc by level for ppx (level today 15.6, not dosed today). Bld cx from 8/3 w/ NGTD  - graft doppler stable, stable perinephric collection pending read from yesterday  - S/P removal of ureteral stent on 7/12  - Continue Doxazosin/Proscar for BPH  - OOB with RN/PT  - DSA negative   - ADAT  - follow up CT C/A/P and renal ultrasound today    Immunosuppression:   - Belatacept: 6/23, 7/4, 7/8. Re-loaded 7/8,  as there was a gap between doses 2/2 seizures. 7/18,  last dose 8/1  - on Sirolimus as of  8/2 per level    - Cellcept 250 BID  - continue Pred 20mg QD  - PPx: Valcyte/ bactrim     DM  - steroid induced hyperglycemia, improving with Endocrine management    AFib/R IJ DVT   - Eliquis with ~40% less efficacy while on fosphenytoin.    - Lovenox held for ANA and transaminitis   - Hold HSQ for now given hematoma  - rate controlled  - will discuss A/C plans daily    HTN:  - Nifedipine/Cardura/Hydralazline  - off Coreg due to bradycardia    DISPO  - continue SICU care

## 2022-08-08 NOTE — PROGRESS NOTE ADULT - PROBLEM SELECTOR PLAN 3
On LT4 50 mcg daily  Please make sure LT4 is given on an empty stomach at least one hour apart from other meds and food to ensure med absorption. DO NOT GIVE WITH VITAMINS/ANTACIDS  If pt back on TFs  switching to IV dosing in order to ensure med absorption. Synthroid 37.5mcg IV   Monitor TFTs outpatient. TSH can be skewed when critically ill

## 2022-08-08 NOTE — PROGRESS NOTE ADULT - NUTRITIONAL ASSESSMENT
This patient has been assessed with a concern for Malnutrition and has been determined to have a diagnosis/diagnoses of Severe protein-calorie malnutrition.    This patient is being managed with:   Diet Soft and Bite Sized-  Consistent Carbohydrate {Evening Snack} (CSTCHOSN)  For patients receiving Renal Replacement - No Protein Restr No Conc K No Conc Phos Low Sodium (RENAL)  Supplement Feeding Modality:  Oral  Nepro Cans or Servings Per Day:  3       Frequency:  Three Times a day  Entered: Aug  7 2022  1:47PM

## 2022-08-08 NOTE — PROGRESS NOTE ADULT - ASSESSMENT
66 year old Male with PMHx ESRD s/p PermCath removal 5/10/22 s/p Rt DDRT 4/21/22,  CVA (2019), Afib on apixaban, seizure activity, CAD s/p stents, CABG (2020), HTN, HLD, DM on insulin, gastric/duodenal ulcer, recent hospitalization 4/28/22 -5/10/22 with weakness/anemia found to have perinephric hematoma requiring evacuation and repair of bleeding arterial anastomosis who presented to [facility] for status epilepticus requiring intubation for hypoxic respiratory failure.     Subsequently developed continued seizures and hypothermia -> resolved   Was initiated on empiric antibiotics  s/p LP which was not suggestive of infectious process  Blood, urine and sputum cultures without positive sample    U/A (7/12) 161 WBC  UCx (7/13) 50-99K ESBL Klebsiella   s/p 3 days of Ertapenem    UA (7/25) with 40 WBC  UCx (7/25) 50-99k ESBL Klebsiella     Transaminitis trending down   Fever curve improved   S/p HD via L chest HD cath    RUQ (7/25) with hepatomegaly   CT c/a/p (7/25) with only small perinephric fluid collection, small volume ascites.  Doppler US R Kidney (7/27) with mild thickening of collecting system and ureter and small   Doppler US R Kidney (7/29) with fullness of collecting system and continued urothelial thickening.     RVP (7/25) Negative  CMV PCR (7/22) Negative  EBV PCR (7/25) Negative   HBV PCR (7/25) Negative   HHV-6 PCR (7/25) POSITIVE   Parvovirus IgM and PCR (7/26) Negative   HSV 1/2 PCR (7/26) Negative   Adenovirus PCR (7/26) Negative    CT A/P (8/5) with No drainable fluid collections and Right psoas retroperitoneal hematoma.    #Rash (Erythema overlying Renal Transplant), Leukocytosis,  Renal Transplant Recipient  Developed seizure again overnight 8/3 > 8/4  Significant increase in leukocytosis  Some of the leukocytosis can be attributed to corticosteroids and stress-induced rise  However, now with increase in induration and now erythema of the abdominal wall overlying the renal transplant site  --Would consider Dermatology consult for tissue culture (would send Bacterial, Fungal and AFB culture) and pathology  --Continue Vancomycin by level  --Agree with 2x blood culture today  --Monitor off of further Ertapenem (completed 10 days of carbapenem for Transplant Pyelonephritis)  --Continue to follow CBC with diff  --Continue to follow temperature curve  --Follow up on preliminary blood cultures     I will continue to follow. Please feel free to contact me with any further questions.    Carlton Hoover M.D.  [facility] Division of Infectious Disease  8AM-5PM Monday - Friday: Available on [employer] Teams  After Hours and Holidays (or if no response on [employer] Teams): Please contact the Infectious Diseases Office at [phone]    The above assessment and plan were discussed with Temi, transplant surgery NP  66 year old Male with PMHx ESRD s/p PermCath removal 5/10/22 s/p Rt DDRT 4/21/22,  CVA (2019), Afib on apixaban, seizure activity, CAD s/p stents, CABG (2020), HTN, HLD, DM on insulin, gastric/duodenal ulcer, recent hospitalization 4/28/22 -5/10/22 with weakness/anemia found to have perinephric hematoma requiring evacuation and repair of bleeding arterial anastomosis who presented to [facility] for status epilepticus requiring intubation for hypoxic respiratory failure.     Subsequently developed continued seizures and hypothermia -> resolved   Was initiated on empiric antibiotics  s/p LP which was not suggestive of infectious process  Blood, urine and sputum cultures without positive sample    U/A (7/12) 161 WBC  UCx (7/13) 50-99K ESBL Klebsiella   s/p 3 days of Ertapenem    UA (7/25) with 40 WBC  UCx (7/25) 50-99k ESBL Klebsiella     Transaminitis trending down   Fever curve improved   S/p HD via L chest HD cath    RUQ (7/25) with hepatomegaly   CT c/a/p (7/25) with only small perinephric fluid collection, small volume ascites.  Doppler US R Kidney (7/27) with mild thickening of collecting system and ureter and small   Doppler US R Kidney (7/29) with fullness of collecting system and continued urothelial thickening.     RVP (7/25) Negative  CMV PCR (7/22) Negative  EBV PCR (7/25) Negative   HBV PCR (7/25) Negative   HHV-6 PCR (7/25) POSITIVE   Parvovirus IgM and PCR (7/26) Negative   HSV 1/2 PCR (7/26) Negative   Adenovirus PCR (7/26) Negative    CT A/P (8/5) with No drainable fluid collections and Right psoas retroperitoneal hematoma.    #Rash (Erythema overlying Renal Transplant), Leukocytosis,  Renal Transplant Recipient  Developed seizure again overnight 8/3 > 8/4  Significant increase in leukocytosis  Some of the leukocytosis can be attributed to corticosteroids and stress-induced rise  However, now with increase in induration and now erythema of the abdominal wall overlying the renal transplant site  --Recommend Dermatology consult for tissue culture (would send Bacterial, Fungal and AFB culture) and pathology  --Continue Vancomycin by level  --Agree with 2x blood culture today  --Completed 10 days of Ertapenem for transplant pyelo but if worsening clinical status (despite seizure risk) would start Meropenem   --Continue to follow CBC with diff  --Continue to follow temperature curve  --Follow up on preliminary blood cultures     I will continue to follow. Please feel free to contact me with any further questions.    Carlton Hoover M.D.  [facility] Division of Infectious Disease  8AM-5PM Monday - Friday: Available on [employer] Teams  After Hours and Holidays (or if no response on [employer] Teams): Please contact the Infectious Diseases Office at [phone]    The above assessment and plan were discussed with Temi transplant surgery NP

## 2022-08-08 NOTE — PROVIDER CONTACT NOTE (OTHER) - ASSESSMENT
Pt anox4, vitals as per flowsheet. Abd taut, tender to palpation with nonblanchable/blanchable redness to RLQ.

## 2022-08-08 NOTE — PROGRESS NOTE ADULT - ASSESSMENT
66 year old male with PMH of  DM type II, HTN, CAD s/p CABG in 2020, Afib on Eliquis, CVA in 2019 due to Afib, Seizure d/o (last episode was on 4/8/22), h/o GI bleed in 2/2022 EGD with duodenal ulcer and ESRD on HD s/p R DDRT from DCD donor on 4/21/22 complicated by DGF requiring HD until 4/29/22, later had good graft function who was readmitted with status epilepticus in setting of UTI/antibiotics and sub-therapeutic valproic acid level.  Transferred to SICU on 7/25 with signs of sepsis. 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 Received pulse steroids (Solumedrol 500 on 7/30 -> 250 on 7/31).     1. 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 No  DSA.   s/p pulse dose steroids. Thymo was not given to treat the rejection as he is too frail and at increased risk for infections. Now with failed allograft function, Remains anuric and is dialysis dependent.   Last IHD was on 8/6 with  UF of 2L.   Pt with poor BFR of only 200 during last HD. Plan for IR guided HD cath exchange noted, IR notes reviewed. Plan for HD today after HD catheter exchange. Monitor for labs and urineoutput. Bladder ray prn.      2. IS meds- was on Belatacept. now on Sirolimus ,  mg po bid and steroid taper.     3. AMS , seizures - had 3 episodes of seizures on 8/3. CT head on 8/3 was negative.  On Fosphenytoin. No further episodes of seizures since 8/3. Neurology following.    4. DM -  on Lantus and lispro.     5. Cellulitis over RLQ - WBC improved to 19K today . On Vancomycin . CT A/P on 8/6 revealed a retroperitoneal hematoma. Txp USG 8/6  - Patent renal artery vasculature. Stable appearance the transplant kidney .Persistent elevated resistive indices  Diffuse abdominal wall edematous changes right lower quadrant without focal collection  cultures from 8/3  negative so far.     6.  ESBL klebsiella UTI (7/15 + UCx, 7/25 +UCx)- completed a course of  Ertapenam on  8/3 + Fluconazole.    7. LE edema: Pt with B/L LE/UE swelling R>L, check doppler studies of UE and LE to rule out DVT.

## 2022-08-08 NOTE — PROGRESS NOTE ADULT - SUBJECTIVE AND OBJECTIVE BOX
24 HOUR EVENTS:      HISTORY  67 y/o M with PMH: DM type II, HTN, CAD s/p CABG in 2020, Afib on Eliquis, CVA in 2019 due to Afib, Seizure d/o following CVA last episode was on 4/8/22, h/o GI bleed in 2/2022 EGD with duodenal ulcer.  ESRD due to DM was on HD since 2019.     s/p R DCD DDRT (1A/1V/1U + stent)  on 4/21/22.    Donor was 58 , KDPI 82 HLA mismatch 1, 2, 2. No DSA, cPRA 0%. CMV +/+  Course c/b  by DGF, was on HD until 4/29/22.     Re-admitted in May for anemia in the setting of large perinephric hematoma s/p evacuation and repair of arterial anastomosis on 5/2/22. Intra operative biopsy showed no rejection, Creatinine ranging ~2mg/dL.    In Rehab: recently dx klebsiella UTI rx with ertapenem - then switched to Levaquin day #6.    He also had parainfluenza pneumonia. Was at rehab progressing well.      - admitted with status epilepticus  - 6/11 R pleural effusion - s/p thoracentesis 1.3L -> bleeding post procedure  - 6/11 re-intubated, CT placed, 600ml hemothorax drained.    - 6/15 s/p VATS 2.2L old blood removed; second chest tube placed   - 6/18-19 chest tubes removed  - 6/21: PRES, off ENV  - 6/23: started Belatacept  - 6/26: intubated for AMS and airway protection  - 6/28: extubated  - 6/29: LP negative, antibiotics D/Agustin  - seizure free since 6/27  - 7/11: ureteral stent removed  - ESBL Kleb UTI, Erta x 3D  - 7/25 transferred to SICU for sepsis/elevated LFTs  - 7/27 HD restarted via L shiley  - 7/29 IR bx with 2A ACR, s/p pulse steroids  - 8/4 refractory seizures        SUBJECTIVE/ROS:  [x ] A ten-point review of systems was otherwise negative except as noted.  [ ] Due to altered mental status/intubation, subjective information were not able to be obtained from the patient. History was obtained, to the extent possible, from review of the chart and collateral sources of information.      NEURO  Exam: sleepy, but opens eyes and follows commands  Meds: fosphenytoin IVPB 200 milliGRAM(s) PE IV Intermittent every 12 hours  melatonin 6 milliGRAM(s) Oral at bedtime    [x] Adequacy of sedation and pain control has been assessed and adjusted      RESPIRATORY  RR: 16 (08-08-22 @ 00:00) (14 - 30)  SpO2: 97% (08-08-22 @ 00:00) (96% - 99%)  Exam: unlabored, clear to auscultation bilaterally    [N/A] Extubation Readiness Assessed      CARDIOVASCULAR  HR: 60 (08-08-22 @ 00:00) (53 - 63)  BP: 159/69 (08-08-22 @ 00:00) (126/60 - 187/74)  BP(mean): 99 (08-08-22 @ 00:00) (87 - 120)      Exam: regular rate and rhythm  Cardiac Rhythm: sinus  Perfusion     [x]Adequate   [ ]Inadequate  Mentation   [x]Normal       [ ]Reduced  Extremities  [x]Warm         [ ]Cool  Volume Status [ ]Hypervolemic [x]Euvolemic [ ]Hypovolemic  Meds: amLODIPine   Tablet 10 milliGRAM(s) Oral daily  doxazosin 4 milliGRAM(s) Oral <User Schedule>  hydrALAZINE 25 milliGRAM(s) Oral every 8 hours        GI/NUTRITION  Exam: soft, nontender, nondistended, incision right lower quadrant, erythematous and tender  Diet: reg  Meds: pantoprazole  Injectable 40 milliGRAM(s) IV Push daily  polyethylene glycol 3350 17 Gram(s) Oral daily  senna 2 Tablet(s) Oral at bedtime      GENITOURINARY  I&O's Detail    08-06 @ 07:01  -  08-07 @ 07:00  --------------------------------------------------------  IN:    Enteral Tube Flush: 320 mL    IV PiggyBack: 100 mL    Nepro with Carb Steady: 480 mL    Other (mL): 800 mL  Total IN: 1700 mL    OUT:    Other (mL): 2800 mL  Total OUT: 2800 mL    Total NET: -1100 mL      08-07 @ 07:01  -  08-08 @ 01:57  --------------------------------------------------------  IN:    Nepro with Carb Steady: 140 mL    Oral Fluid: 150 mL  Total IN: 290 mL    OUT:    Voided (mL): 30 mL  Total OUT: 30 mL    Total NET: 260 mL          08-07    132<L>  |  92<L>  |  62<H>  ----------------------------<  297<H>  5.2   |  21<L>  |  3.98<H>    Ca    8.4      07 Aug 2022 17:20  Phos  5.2     08-07  Mg     2.3     08-07    TPro  5.9<L>  /  Alb  2.1<L>  /  TBili  0.2  /  DBili  0.1  /  AST  20  /  ALT  19  /  AlkPhos  286<H>  08-07    [ ] Miranda catheter, indication: N/A  Meds:       HEMATOLOGIC  Meds:   [x] VTE Prophylaxis                        9.3    19.38 )-----------( 365      ( 07 Aug 2022 06:05 )             28.9     PT/INR - ( 07 Aug 2022 06:05 )   PT: 14.5 sec;   INR: 1.26 ratio         PTT - ( 07 Aug 2022 06:05 )  PTT:30.3 sec  Transfusion     [ ] PRBC   [ ] Platelets   [ ] FFP   [ ] Cryoprecipitate      INFECTIOUS DISEASES  WBC Count: 19.38 K/uL (08-07 @ 06:05)    RECENT CULTURES:  Specimen Source: .Blood Blood  Date/Time: 08-03 @ 23:46  Culture Results:   No growth to date.  Gram Stain: --  Organism: --    Meds: epoetin cortney-epbx (RETACRIT) Injectable 35224 Unit(s) SubCutaneous every 7 days  mycophenolate mofetil 250 milliGRAM(s) Oral <User Schedule>  sirolimus 2.5 milliGRAM(s) Oral <User Schedule>  trimethoprim   80 mG/sulfamethoxazole 400 mG 1 Tablet(s) Oral daily  valGANciclovir 450 milliGRAM(s) Oral <User Schedule>        ENDOCRINE  CAPILLARY BLOOD GLUCOSE      POCT Blood Glucose.: 230 mg/dL (07 Aug 2022 22:19)  POCT Blood Glucose.: 279 mg/dL (07 Aug 2022 16:46)  POCT Blood Glucose.: 227 mg/dL (07 Aug 2022 12:05)  POCT Blood Glucose.: 172 mg/dL (07 Aug 2022 05:22)    Meds: finasteride 5 milliGRAM(s) Oral daily  insulin glargine Injectable (LANTUS) 5 Unit(s) SubCutaneous <User Schedule>  insulin lispro (ADMELOG) corrective regimen sliding scale   SubCutaneous three times a day before meals  insulin regular  human corrective regimen sliding scale   SubCutaneous at bedtime  levothyroxine Injectable 37.5 MICROGram(s) IV Push at bedtime  predniSONE   Tablet 20 milliGRAM(s) Oral daily        ACCESS DEVICES:  [ ] Peripheral IV  [ ] Central Venous Line	[ ] R	[ ] L	[ ] IJ	[ ] Fem	[ ] SC	Placed:   [ ] Arterial Line		[ ] R	[ ] L	[ ] Fem	[ ] Rad	[ ] Ax	Placed:   [ ] PICC:					[ ] Mediport  [ ] Urinary Catheter, Date Placed:   [x] Necessity of urinary, arterial, and venous catheters discussed    OTHER MEDICATIONS:  chlorhexidine 2% Cloths 1 Application(s) Topical <User Schedule>      CODE STATUS:  full code       24 HOUR EVENTS:  -d/c'd feeding tube, passed bedside swallow on on soft and bite sized diet  -vanco T 15.6, held today  -likely myron exchange by IR am for low flow rate  -given Lantus 5unit bedtime for slowly increasing glucose  -cell cept is down to 250mg bid        HISTORY  67 y/o M with PMH: DM type II, HTN, CAD s/p CABG in 2020, Afib on Eliquis, CVA in 2019 due to Afib, Seizure d/o following CVA last episode was on 4/8/22, h/o GI bleed in 2/2022 EGD with duodenal ulcer.  ESRD due to DM was on HD since 2019.     s/p R DCD DDRT (1A/1V/1U + stent)  on 4/21/22.    Donor was 58 , KDPI 82 HLA mismatch 1, 2, 2. No DSA, cPRA 0%. CMV +/+  Course c/b  by DGF, was on HD until 4/29/22.     Re-admitted in May for anemia in the setting of large perinephric hematoma s/p evacuation and repair of arterial anastomosis on 5/2/22. Intra operative biopsy showed no rejection, Creatinine ranging ~2mg/dL.    In Rehab: recently dx klebsiella UTI rx with ertapenem - then switched to Levaquin day #6.    He also had parainfluenza pneumonia. Was at rehab progressing well.      - admitted with status epilepticus  - 6/11 R pleural effusion - s/p thoracentesis 1.3L -> bleeding post procedure  - 6/11 re-intubated, CT placed, 600ml hemothorax drained.    - 6/15 s/p VATS 2.2L old blood removed; second chest tube placed   - 6/18-19 chest tubes removed  - 6/21: PRES, off ENV  - 6/23: started Belatacept  - 6/26: intubated for AMS and airway protection  - 6/28: extubated  - 6/29: LP negative, antibiotics D/Agustin  - seizure free since 6/27  - 7/11: ureteral stent removed  - ESBL Kleb UTI, Erta x 3D  - 7/25 transferred to SICU for sepsis/elevated LFTs  - 7/27 HD restarted via L shiley  - 7/29 IR bx with 2A ACR, s/p pulse steroids  - 8/4 refractory seizures        SUBJECTIVE/ROS:  [x ] A ten-point review of systems was otherwise negative except as noted.  [ ] Due to altered mental status/intubation, subjective information were not able to be obtained from the patient. History was obtained, to the extent possible, from review of the chart and collateral sources of information.      NEURO  Exam: sleepy, but opens eyes and follows commands  Meds: fosphenytoin IVPB 200 milliGRAM(s) PE IV Intermittent every 12 hours  melatonin 6 milliGRAM(s) Oral at bedtime    [x] Adequacy of sedation and pain control has been assessed and adjusted      RESPIRATORY  RR: 16 (08-08-22 @ 00:00) (14 - 30)  SpO2: 97% (08-08-22 @ 00:00) (96% - 99%)  Exam: unlabored, clear to auscultation bilaterally    [N/A] Extubation Readiness Assessed      CARDIOVASCULAR  HR: 60 (08-08-22 @ 00:00) (53 - 63)  BP: 159/69 (08-08-22 @ 00:00) (126/60 - 187/74)  BP(mean): 99 (08-08-22 @ 00:00) (87 - 120)      Exam: regular rate and rhythm  Cardiac Rhythm: sinus  Perfusion     [x]Adequate   [ ]Inadequate  Mentation   [x]Normal       [ ]Reduced  Extremities  [x]Warm         [ ]Cool  Volume Status [ ]Hypervolemic [x]Euvolemic [ ]Hypovolemic  Meds: amLODIPine   Tablet 10 milliGRAM(s) Oral daily  doxazosin 4 milliGRAM(s) Oral <User Schedule>  hydrALAZINE 25 milliGRAM(s) Oral every 8 hours        GI/NUTRITION  Exam: soft, nontender, nondistended, incision right lower quadrant, erythematous and tender  Diet: reg  Meds: pantoprazole  Injectable 40 milliGRAM(s) IV Push daily  polyethylene glycol 3350 17 Gram(s) Oral daily  senna 2 Tablet(s) Oral at bedtime      GENITOURINARY  I&O's Detail    08-06 @ 07:01  -  08-07 @ 07:00  --------------------------------------------------------  IN:    Enteral Tube Flush: 320 mL    IV PiggyBack: 100 mL    Nepro with Carb Steady: 480 mL    Other (mL): 800 mL  Total IN: 1700 mL    OUT:    Other (mL): 2800 mL  Total OUT: 2800 mL    Total NET: -1100 mL      08-07 @ 07:01  -  08-08 @ 01:57  --------------------------------------------------------  IN:    Nepro with Carb Steady: 140 mL    Oral Fluid: 150 mL  Total IN: 290 mL    OUT:    Voided (mL): 30 mL  Total OUT: 30 mL    Total NET: 260 mL          08-07    132<L>  |  92<L>  |  62<H>  ----------------------------<  297<H>  5.2   |  21<L>  |  3.98<H>    Ca    8.4      07 Aug 2022 17:20  Phos  5.2     08-07  Mg     2.3     08-07    TPro  5.9<L>  /  Alb  2.1<L>  /  TBili  0.2  /  DBili  0.1  /  AST  20  /  ALT  19  /  AlkPhos  286<H>  08-07    [ ] Miranda catheter, indication: N/A  Meds:       HEMATOLOGIC  Meds:   [x] VTE Prophylaxis                        9.3    19.38 )-----------( 365      ( 07 Aug 2022 06:05 )             28.9     PT/INR - ( 07 Aug 2022 06:05 )   PT: 14.5 sec;   INR: 1.26 ratio         PTT - ( 07 Aug 2022 06:05 )  PTT:30.3 sec  Transfusion     [ ] PRBC   [ ] Platelets   [ ] FFP   [ ] Cryoprecipitate      INFECTIOUS DISEASES  WBC Count: 19.38 K/uL (08-07 @ 06:05)    RECENT CULTURES:  Specimen Source: .Blood Blood  Date/Time: 08-03 @ 23:46  Culture Results:   No growth to date.  Gram Stain: --  Organism: --    Meds: epoetin cortney-epbx (RETACRIT) Injectable 07373 Unit(s) SubCutaneous every 7 days  mycophenolate mofetil 250 milliGRAM(s) Oral <User Schedule>  sirolimus 2.5 milliGRAM(s) Oral <User Schedule>  trimethoprim   80 mG/sulfamethoxazole 400 mG 1 Tablet(s) Oral daily  valGANciclovir 450 milliGRAM(s) Oral <User Schedule>        ENDOCRINE  CAPILLARY BLOOD GLUCOSE      POCT Blood Glucose.: 230 mg/dL (07 Aug 2022 22:19)  POCT Blood Glucose.: 279 mg/dL (07 Aug 2022 16:46)  POCT Blood Glucose.: 227 mg/dL (07 Aug 2022 12:05)  POCT Blood Glucose.: 172 mg/dL (07 Aug 2022 05:22)    Meds: finasteride 5 milliGRAM(s) Oral daily  insulin glargine Injectable (LANTUS) 5 Unit(s) SubCutaneous <User Schedule>  insulin lispro (ADMELOG) corrective regimen sliding scale   SubCutaneous three times a day before meals  insulin regular  human corrective regimen sliding scale   SubCutaneous at bedtime  levothyroxine Injectable 37.5 MICROGram(s) IV Push at bedtime  predniSONE   Tablet 20 milliGRAM(s) Oral daily        ACCESS DEVICES:  [ ] Peripheral IV  [ ] Central Venous Line	[ ] R	[ ] L	[ ] IJ	[ ] Fem	[ ] SC	Placed:   [ ] Arterial Line		[ ] R	[ ] L	[ ] Fem	[ ] Rad	[ ] Ax	Placed:   [ ] PICC:					[ ] Mediport  [ ] Urinary Catheter, Date Placed:   [x] Necessity of urinary, arterial, and venous catheters discussed    OTHER MEDICATIONS:  chlorhexidine 2% Cloths 1 Application(s) Topical <User Schedule>      CODE STATUS:  full code       24 HOUR EVENTS:  -d/c'd feeding tube, passed bedside swallow on on soft and bite sized diet  -vanco T 15.6, held today  -likely shijn exchange by IR am for low flow rate  -given Lantus 5unit bedtime for slowly increasing glucose  -cell cept is down to 250mg bid  -Febrile ON, gennaro blood cultures and UA. Received Tylenol         HISTORY  67 y/o M with PMH: DM type II, HTN, CAD s/p CABG in 2020, Afib on Eliquis, CVA in 2019 due to Afib, Seizure d/o following CVA last episode was on 4/8/22, h/o GI bleed in 2/2022 EGD with duodenal ulcer.  ESRD due to DM was on HD since 2019.     s/p R DCD DDRT (1A/1V/1U + stent)  on 4/21/22.    Donor was 58 , KDPI 82 HLA mismatch 1, 2, 2. No DSA, cPRA 0%. CMV +/+  Course c/b  by DGF, was on HD until 4/29/22.     Re-admitted in May for anemia in the setting of large perinephric hematoma s/p evacuation and repair of arterial anastomosis on 5/2/22. Intra operative biopsy showed no rejection, Creatinine ranging ~2mg/dL.    In Rehab: recently dx klebsiella UTI rx with ertapenem - then switched to Levaquin day #6.    He also had parainfluenza pneumonia. Was at rehab progressing well.      - admitted with status epilepticus  - 6/11 R pleural effusion - s/p thoracentesis 1.3L -> bleeding post procedure  - 6/11 re-intubated, CT placed, 600ml hemothorax drained.    - 6/15 s/p VATS 2.2L old blood removed; second chest tube placed   - 6/18-19 chest tubes removed  - 6/21: PRES, off ENV  - 6/23: started Belatacept  - 6/26: intubated for AMS and airway protection  - 6/28: extubated  - 6/29: LP negative, antibiotics D/Agustin  - seizure free since 6/27  - 7/11: ureteral stent removed  - ESBL Kleb UTI, Erta x 3D  - 7/25 transferred to SICU for sepsis/elevated LFTs  - 7/27 HD restarted via L shiley  - 7/29 IR bx with 2A ACR, s/p pulse steroids  - 8/4 refractory seizures        SUBJECTIVE/ROS:  [x ] A ten-point review of systems was otherwise negative except as noted.  [ ] Due to altered mental status/intubation, subjective information were not able to be obtained from the patient. History was obtained, to the extent possible, from review of the chart and collateral sources of information.      NEURO  Exam: sleepy, but opens eyes and follows commands  Meds: fosphenytoin IVPB 200 milliGRAM(s) PE IV Intermittent every 12 hours  melatonin 6 milliGRAM(s) Oral at bedtime    [x] Adequacy of sedation and pain control has been assessed and adjusted      RESPIRATORY  RR: 16 (08-08-22 @ 00:00) (14 - 30)  SpO2: 97% (08-08-22 @ 00:00) (96% - 99%)  Exam: unlabored, clear to auscultation bilaterally    [N/A] Extubation Readiness Assessed      CARDIOVASCULAR  HR: 60 (08-08-22 @ 00:00) (53 - 63)  BP: 159/69 (08-08-22 @ 00:00) (126/60 - 187/74)  BP(mean): 99 (08-08-22 @ 00:00) (87 - 120)      Exam: regular rate and rhythm  Cardiac Rhythm: sinus  Perfusion     [x]Adequate   [ ]Inadequate  Mentation   [x]Normal       [ ]Reduced  Extremities  [x]Warm         [ ]Cool  Volume Status [ ]Hypervolemic [x]Euvolemic [ ]Hypovolemic  Meds: amLODIPine   Tablet 10 milliGRAM(s) Oral daily  doxazosin 4 milliGRAM(s) Oral <User Schedule>  hydrALAZINE 25 milliGRAM(s) Oral every 8 hours        GI/NUTRITION  Exam: soft, nontender, nondistended, incision right lower quadrant, erythematous and tender  Diet: reg  Meds: pantoprazole  Injectable 40 milliGRAM(s) IV Push daily  polyethylene glycol 3350 17 Gram(s) Oral daily  senna 2 Tablet(s) Oral at bedtime      GENITOURINARY  I&O's Detail    08-06 @ 07:01  -  08-07 @ 07:00  --------------------------------------------------------  IN:    Enteral Tube Flush: 320 mL    IV PiggyBack: 100 mL    Nepro with Carb Steady: 480 mL    Other (mL): 800 mL  Total IN: 1700 mL    OUT:    Other (mL): 2800 mL  Total OUT: 2800 mL    Total NET: -1100 mL      08-07 @ 07:01  -  08-08 @ 01:57  --------------------------------------------------------  IN:    Nepro with Carb Steady: 140 mL    Oral Fluid: 150 mL  Total IN: 290 mL    OUT:    Voided (mL): 30 mL  Total OUT: 30 mL    Total NET: 260 mL          08-07    132<L>  |  92<L>  |  62<H>  ----------------------------<  297<H>  5.2   |  21<L>  |  3.98<H>    Ca    8.4      07 Aug 2022 17:20  Phos  5.2     08-07  Mg     2.3     08-07    TPro  5.9<L>  /  Alb  2.1<L>  /  TBili  0.2  /  DBili  0.1  /  AST  20  /  ALT  19  /  AlkPhos  286<H>  08-07    [ ] Miranda catheter, indication: N/A  Meds:       HEMATOLOGIC  Meds:   [x] VTE Prophylaxis                        9.3    19.38 )-----------( 365      ( 07 Aug 2022 06:05 )             28.9     PT/INR - ( 07 Aug 2022 06:05 )   PT: 14.5 sec;   INR: 1.26 ratio         PTT - ( 07 Aug 2022 06:05 )  PTT:30.3 sec  Transfusion     [ ] PRBC   [ ] Platelets   [ ] FFP   [ ] Cryoprecipitate      INFECTIOUS DISEASES  WBC Count: 19.38 K/uL (08-07 @ 06:05)    RECENT CULTURES:  Specimen Source: .Blood Blood  Date/Time: 08-03 @ 23:46  Culture Results:   No growth to date.  Gram Stain: --  Organism: --    Meds: epoetin cortney-epbx (RETACRIT) Injectable 07922 Unit(s) SubCutaneous every 7 days  mycophenolate mofetil 250 milliGRAM(s) Oral <User Schedule>  sirolimus 2.5 milliGRAM(s) Oral <User Schedule>  trimethoprim   80 mG/sulfamethoxazole 400 mG 1 Tablet(s) Oral daily  valGANciclovir 450 milliGRAM(s) Oral <User Schedule>        ENDOCRINE  CAPILLARY BLOOD GLUCOSE      POCT Blood Glucose.: 230 mg/dL (07 Aug 2022 22:19)  POCT Blood Glucose.: 279 mg/dL (07 Aug 2022 16:46)  POCT Blood Glucose.: 227 mg/dL (07 Aug 2022 12:05)  POCT Blood Glucose.: 172 mg/dL (07 Aug 2022 05:22)    Meds: finasteride 5 milliGRAM(s) Oral daily  insulin glargine Injectable (LANTUS) 5 Unit(s) SubCutaneous <User Schedule>  insulin lispro (ADMELOG) corrective regimen sliding scale   SubCutaneous three times a day before meals  insulin regular  human corrective regimen sliding scale   SubCutaneous at bedtime  levothyroxine Injectable 37.5 MICROGram(s) IV Push at bedtime  predniSONE   Tablet 20 milliGRAM(s) Oral daily        ACCESS DEVICES:  [ ] Peripheral IV  [ ] Central Venous Line	[ ] R	[ ] L	[ ] IJ	[ ] Fem	[ ] SC	Placed:   [ ] Arterial Line		[ ] R	[ ] L	[ ] Fem	[ ] Rad	[ ] Ax	Placed:   [ ] PICC:					[ ] Mediport  [ ] Urinary Catheter, Date Placed:   [x] Necessity of urinary, arterial, and venous catheters discussed    OTHER MEDICATIONS:  chlorhexidine 2% Cloths 1 Application(s) Topical <User Schedule>      CODE STATUS:  full code       24 HOUR EVENTS:  -d/c'd feeding tube, passed bedside swallow on on soft and bite sized diet  -vanco T 15.6, held today  -likely shiley exchange by IR am for low flow rate  -given Lantus 5unit bedtime for slowly increasing glucose  -cell cept is down to 250mg bid  -Febrile ON, gennaro blood cultures and UA. Received Tylenol   - Reglan for hiccups        HISTORY  65 y/o M with PMH: DM type II, HTN, CAD s/p CABG in 2020, Afib on Eliquis, CVA in 2019 due to Afib, Seizure d/o following CVA last episode was on 4/8/22, h/o GI bleed in 2/2022 EGD with duodenal ulcer.  ESRD due to DM was on HD since 2019.     s/p R DCD DDRT (1A/1V/1U + stent)  on 4/21/22.    Donor was 58 , KDPI 82 HLA mismatch 1, 2, 2. No DSA, cPRA 0%. CMV +/+  Course c/b  by DGF, was on HD until 4/29/22.     Re-admitted in May for anemia in the setting of large perinephric hematoma s/p evacuation and repair of arterial anastomosis on 5/2/22. Intra operative biopsy showed no rejection, Creatinine ranging ~2mg/dL.    In Rehab: recently dx klebsiella UTI rx with ertapenem - then switched to Levaquin day #6.    He also had parainfluenza pneumonia. Was at rehab progressing well.      - admitted with status epilepticus  - 6/11 R pleural effusion - s/p thoracentesis 1.3L -> bleeding post procedure  - 6/11 re-intubated, CT placed, 600ml hemothorax drained.    - 6/15 s/p VATS 2.2L old blood removed; second chest tube placed   - 6/18-19 chest tubes removed  - 6/21: PRES, off ENV  - 6/23: started Belatacept  - 6/26: intubated for AMS and airway protection  - 6/28: extubated  - 6/29: LP negative, antibiotics D/Agustin  - seizure free since 6/27  - 7/11: ureteral stent removed  - ESBL Kleb UTI, Erta x 3D  - 7/25 transferred to SICU for sepsis/elevated LFTs  - 7/27 HD restarted via L myron  - 7/29 IR bx with 2A ACR, s/p pulse steroids  - 8/4 refractory seizures        SUBJECTIVE/ROS:  [x ] A ten-point review of systems was otherwise negative except as noted.  [ ] Due to altered mental status/intubation, subjective information were not able to be obtained from the patient. History was obtained, to the extent possible, from review of the chart and collateral sources of information.      NEURO  Exam: sleepy, but opens eyes and follows commands  Meds: fosphenytoin IVPB 200 milliGRAM(s) PE IV Intermittent every 12 hours  melatonin 6 milliGRAM(s) Oral at bedtime    [x] Adequacy of sedation and pain control has been assessed and adjusted      RESPIRATORY  RR: 16 (08-08-22 @ 00:00) (14 - 30)  SpO2: 97% (08-08-22 @ 00:00) (96% - 99%)  Exam: unlabored, clear to auscultation bilaterally    [N/A] Extubation Readiness Assessed      CARDIOVASCULAR  HR: 60 (08-08-22 @ 00:00) (53 - 63)  BP: 159/69 (08-08-22 @ 00:00) (126/60 - 187/74)  BP(mean): 99 (08-08-22 @ 00:00) (87 - 120)      Exam: regular rate and rhythm  Cardiac Rhythm: sinus  Perfusion     [x]Adequate   [ ]Inadequate  Mentation   [x]Normal       [ ]Reduced  Extremities  [x]Warm         [ ]Cool  Volume Status [ ]Hypervolemic [x]Euvolemic [ ]Hypovolemic  Meds: amLODIPine   Tablet 10 milliGRAM(s) Oral daily  doxazosin 4 milliGRAM(s) Oral <User Schedule>  hydrALAZINE 25 milliGRAM(s) Oral every 8 hours        GI/NUTRITION  Exam: soft, nontender, nondistended, incision right lower quadrant, erythematous and tender  Diet: reg  Meds: pantoprazole  Injectable 40 milliGRAM(s) IV Push daily  polyethylene glycol 3350 17 Gram(s) Oral daily  senna 2 Tablet(s) Oral at bedtime      GENITOURINARY  I&O's Detail    08-06 @ 07:01  -  08-07 @ 07:00  --------------------------------------------------------  IN:    Enteral Tube Flush: 320 mL    IV PiggyBack: 100 mL    Nepro with Carb Steady: 480 mL    Other (mL): 800 mL  Total IN: 1700 mL    OUT:    Other (mL): 2800 mL  Total OUT: 2800 mL    Total NET: -1100 mL      08-07 @ 07:01  -  08-08 @ 01:57  --------------------------------------------------------  IN:    Nepro with Carb Steady: 140 mL    Oral Fluid: 150 mL  Total IN: 290 mL    OUT:    Voided (mL): 30 mL  Total OUT: 30 mL    Total NET: 260 mL          08-07    132<L>  |  92<L>  |  62<H>  ----------------------------<  297<H>  5.2   |  21<L>  |  3.98<H>    Ca    8.4      07 Aug 2022 17:20  Phos  5.2     08-07  Mg     2.3     08-07    TPro  5.9<L>  /  Alb  2.1<L>  /  TBili  0.2  /  DBili  0.1  /  AST  20  /  ALT  19  /  AlkPhos  286<H>  08-07    [ ] Miranda catheter, indication: N/A  Meds:       HEMATOLOGIC  Meds:   [x] VTE Prophylaxis                        9.3    19.38 )-----------( 365      ( 07 Aug 2022 06:05 )             28.9     PT/INR - ( 07 Aug 2022 06:05 )   PT: 14.5 sec;   INR: 1.26 ratio         PTT - ( 07 Aug 2022 06:05 )  PTT:30.3 sec  Transfusion     [ ] PRBC   [ ] Platelets   [ ] FFP   [ ] Cryoprecipitate      INFECTIOUS DISEASES  WBC Count: 19.38 K/uL (08-07 @ 06:05)    RECENT CULTURES:  Specimen Source: .Blood Blood  Date/Time: 08-03 @ 23:46  Culture Results:   No growth to date.  Gram Stain: --  Organism: --    Meds: epoetin cortney-epbx (RETACRIT) Injectable 90277 Unit(s) SubCutaneous every 7 days  mycophenolate mofetil 250 milliGRAM(s) Oral <User Schedule>  sirolimus 2.5 milliGRAM(s) Oral <User Schedule>  trimethoprim   80 mG/sulfamethoxazole 400 mG 1 Tablet(s) Oral daily  valGANciclovir 450 milliGRAM(s) Oral <User Schedule>        ENDOCRINE  CAPILLARY BLOOD GLUCOSE      POCT Blood Glucose.: 230 mg/dL (07 Aug 2022 22:19)  POCT Blood Glucose.: 279 mg/dL (07 Aug 2022 16:46)  POCT Blood Glucose.: 227 mg/dL (07 Aug 2022 12:05)  POCT Blood Glucose.: 172 mg/dL (07 Aug 2022 05:22)    Meds: finasteride 5 milliGRAM(s) Oral daily  insulin glargine Injectable (LANTUS) 5 Unit(s) SubCutaneous <User Schedule>  insulin lispro (ADMELOG) corrective regimen sliding scale   SubCutaneous three times a day before meals  insulin regular  human corrective regimen sliding scale   SubCutaneous at bedtime  levothyroxine Injectable 37.5 MICROGram(s) IV Push at bedtime  predniSONE   Tablet 20 milliGRAM(s) Oral daily        ACCESS DEVICES:  [ ] Peripheral IV  [ ] Central Venous Line	[ ] R	[ ] L	[ ] IJ	[ ] Fem	[ ] SC	Placed:   [ ] Arterial Line		[ ] R	[ ] L	[ ] Fem	[ ] Rad	[ ] Ax	Placed:   [ ] PICC:					[ ] Mediport  [ ] Urinary Catheter, Date Placed:   [x] Necessity of urinary, arterial, and venous catheters discussed    OTHER MEDICATIONS:  chlorhexidine 2% Cloths 1 Application(s) Topical <User Schedule>      CODE STATUS:  full code       24 HOUR EVENTS:  -d/c'd feeding tube, passed bedside swallow on on soft and bite sized diet  -vanco T 15.6, held today  -shiley exchange today IR am for low flow rate  -given Lantus 5unit bedtime for slowly increasing glucose  -cell cept is down to 250mg bid  -Febrile ON, gennaro blood cultures and UA. Received Tylenol   - Reglan for hiccups        HISTORY  67 y/o M with PMH: DM type II, HTN, CAD s/p CABG in 2020, Afib on Eliquis, CVA in 2019 due to Afib, Seizure d/o following CVA last episode was on 4/8/22, h/o GI bleed in 2/2022 EGD with duodenal ulcer.  ESRD due to DM was on HD since 2019.     s/p R DCD DDRT (1A/1V/1U + stent)  on 4/21/22.    Donor was 58 , KDPI 82 HLA mismatch 1, 2, 2. No DSA, cPRA 0%. CMV +/+  Course c/b  by DGF, was on HD until 4/29/22.     Re-admitted in May for anemia in the setting of large perinephric hematoma s/p evacuation and repair of arterial anastomosis on 5/2/22. Intra operative biopsy showed no rejection, Creatinine ranging ~2mg/dL.    In Rehab: recently dx klebsiella UTI rx with ertapenem - then switched to Levaquin day #6.    He also had parainfluenza pneumonia. Was at rehab progressing well.      - admitted with status epilepticus  - 6/11 R pleural effusion - s/p thoracentesis 1.3L -> bleeding post procedure  - 6/11 re-intubated, CT placed, 600ml hemothorax drained.    - 6/15 s/p VATS 2.2L old blood removed; second chest tube placed   - 6/18-19 chest tubes removed  - 6/21: PRES, off ENV  - 6/23: started Belatacept  - 6/26: intubated for AMS and airway protection  - 6/28: extubated  - 6/29: LP negative, antibiotics D/Agustin  - seizure free since 6/27  - 7/11: ureteral stent removed  - ESBL Kleb UTI, Erta x 3D  - 7/25 transferred to SICU for sepsis/elevated LFTs  - 7/27 HD restarted via L myron  - 7/29 IR bx with 2A ACR, s/p pulse steroids  - 8/4 refractory seizures        SUBJECTIVE/ROS:  [x ] A ten-point review of systems was otherwise negative except as noted.  [ ] Due to altered mental status/intubation, subjective information were not able to be obtained from the patient. History was obtained, to the extent possible, from review of the chart and collateral sources of information.      NEURO  Exam: sleepy, but opens eyes and follows commands  Meds: fosphenytoin IVPB 200 milliGRAM(s) PE IV Intermittent every 12 hours  melatonin 6 milliGRAM(s) Oral at bedtime    [x] Adequacy of sedation and pain control has been assessed and adjusted      RESPIRATORY  RR: 16 (08-08-22 @ 00:00) (14 - 30)  SpO2: 97% (08-08-22 @ 00:00) (96% - 99%)  Exam: unlabored, clear to auscultation bilaterally    [N/A] Extubation Readiness Assessed      CARDIOVASCULAR  HR: 60 (08-08-22 @ 00:00) (53 - 63)  BP: 159/69 (08-08-22 @ 00:00) (126/60 - 187/74)  BP(mean): 99 (08-08-22 @ 00:00) (87 - 120)      Exam: regular rate and rhythm  Cardiac Rhythm: sinus  Perfusion     [x]Adequate   [ ]Inadequate  Mentation   [x]Normal       [ ]Reduced  Extremities  [x]Warm         [ ]Cool  Volume Status [ ]Hypervolemic [x]Euvolemic [ ]Hypovolemic  Meds: amLODIPine   Tablet 10 milliGRAM(s) Oral daily  doxazosin 4 milliGRAM(s) Oral <User Schedule>  hydrALAZINE 25 milliGRAM(s) Oral every 8 hours        GI/NUTRITION  Exam: soft, nontender, nondistended, incision right lower quadrant, erythematous and tender  Diet: reg  Meds: pantoprazole  Injectable 40 milliGRAM(s) IV Push daily  polyethylene glycol 3350 17 Gram(s) Oral daily  senna 2 Tablet(s) Oral at bedtime      GENITOURINARY  I&O's Detail    08-06 @ 07:01  -  08-07 @ 07:00  --------------------------------------------------------  IN:    Enteral Tube Flush: 320 mL    IV PiggyBack: 100 mL    Nepro with Carb Steady: 480 mL    Other (mL): 800 mL  Total IN: 1700 mL    OUT:    Other (mL): 2800 mL  Total OUT: 2800 mL    Total NET: -1100 mL      08-07 @ 07:01  -  08-08 @ 01:57  --------------------------------------------------------  IN:    Nepro with Carb Steady: 140 mL    Oral Fluid: 150 mL  Total IN: 290 mL    OUT:    Voided (mL): 30 mL  Total OUT: 30 mL    Total NET: 260 mL          08-07    132<L>  |  92<L>  |  62<H>  ----------------------------<  297<H>  5.2   |  21<L>  |  3.98<H>    Ca    8.4      07 Aug 2022 17:20  Phos  5.2     08-07  Mg     2.3     08-07    TPro  5.9<L>  /  Alb  2.1<L>  /  TBili  0.2  /  DBili  0.1  /  AST  20  /  ALT  19  /  AlkPhos  286<H>  08-07    [ ] Miranda catheter, indication: N/A  Meds:       HEMATOLOGIC  Meds:   [x] VTE Prophylaxis                        9.3    19.38 )-----------( 365      ( 07 Aug 2022 06:05 )             28.9     PT/INR - ( 07 Aug 2022 06:05 )   PT: 14.5 sec;   INR: 1.26 ratio         PTT - ( 07 Aug 2022 06:05 )  PTT:30.3 sec  Transfusion     [ ] PRBC   [ ] Platelets   [ ] FFP   [ ] Cryoprecipitate      INFECTIOUS DISEASES  WBC Count: 19.38 K/uL (08-07 @ 06:05)    RECENT CULTURES:  Specimen Source: .Blood Blood  Date/Time: 08-03 @ 23:46  Culture Results:   No growth to date.  Gram Stain: --  Organism: --    Meds: epoetin cortney-epbx (RETACRIT) Injectable 93832 Unit(s) SubCutaneous every 7 days  mycophenolate mofetil 250 milliGRAM(s) Oral <User Schedule>  sirolimus 2.5 milliGRAM(s) Oral <User Schedule>  trimethoprim   80 mG/sulfamethoxazole 400 mG 1 Tablet(s) Oral daily  valGANciclovir 450 milliGRAM(s) Oral <User Schedule>        ENDOCRINE  CAPILLARY BLOOD GLUCOSE      POCT Blood Glucose.: 230 mg/dL (07 Aug 2022 22:19)  POCT Blood Glucose.: 279 mg/dL (07 Aug 2022 16:46)  POCT Blood Glucose.: 227 mg/dL (07 Aug 2022 12:05)  POCT Blood Glucose.: 172 mg/dL (07 Aug 2022 05:22)    Meds: finasteride 5 milliGRAM(s) Oral daily  insulin glargine Injectable (LANTUS) 5 Unit(s) SubCutaneous <User Schedule>  insulin lispro (ADMELOG) corrective regimen sliding scale   SubCutaneous three times a day before meals  insulin regular  human corrective regimen sliding scale   SubCutaneous at bedtime  levothyroxine Injectable 37.5 MICROGram(s) IV Push at bedtime  predniSONE   Tablet 20 milliGRAM(s) Oral daily        ACCESS DEVICES:  [ ] Peripheral IV  [ ] Central Venous Line	[ ] R	[ ] L	[ ] IJ	[ ] Fem	[ ] SC	Placed:   [ ] Arterial Line		[ ] R	[ ] L	[ ] Fem	[ ] Rad	[ ] Ax	Placed:   [ ] PICC:					[ ] Mediport  [ ] Urinary Catheter, Date Placed:   [x] Necessity of urinary, arterial, and venous catheters discussed    OTHER MEDICATIONS:  chlorhexidine 2% Cloths 1 Application(s) Topical <User Schedule>      CODE STATUS:  full code

## 2022-08-08 NOTE — PROCEDURE NOTE - NSPROCDETAILS_GEN_ALL_CORE
location identified, draped/prepped, sterile technique used, needle inserted/introduced/area cleaned in sterile fashion
location identified, draped/prepped, sterile technique used, needle inserted/introduced/positive blood return obtained via catheter/connected to a pressurized flush line/sutured in place/hemostasis with direct pressure, dressing applied/Seldinger technique/all materials/supplies accounted for at end of procedure
location identified, draped/prepped, sterile technique used, needle inserted/introduced/positive blood return obtained via catheter/connected to a pressurized flush line/sutured in place/hemostasis with direct pressure, dressing applied/Seldinger technique/all materials/supplies accounted for at end of procedure
location identified, draped/prepped, sterile technique used/sterile dressing applied/sterile technique, catheter placed/supine position/ultrasound guidance
guidewire recovered/lumen(s) aspirated and flushed/sterile dressing applied/sterile technique, catheter placed/ultrasound guidance with use of sterile gel and probe cove
thoracostomy tube placed percutaneously
Line was exchanged over a wire under fluoroscopic guidance./guidewire recovered/lumen(s) aspirated and flushed/sterile dressing applied/sterile technique, catheter placed
location identified, draped/prepped, sterile technique used, needle inserted/introduced/Seldinger technique/catheter inserted over needle/connection to syringe/ultrasound assessment of effusion (localization)

## 2022-08-08 NOTE — PROGRESS NOTE ADULT - NS ATTEND AMEND GEN_ALL_CORE FT
IMMUNOSUPPRESSION:  Sirolimus level today: pending  Sirolimus level yesterday: 3.5  Aim for level of: 8  Current dose of sirolimus: 2.5 mg by mouth in AM  MMF: 250 mg every 12 hours by mouth  Prednisone: 20 mg daily  On Belatacept  Dose modification:   - will adjust sirolimus dose if necessary once level is available    OTHER:  Kidney transplant recipient.  Alert and responsive to basic stimuli and communication.  Abdomen soft and non-tender  Wound in right lower quadrant with likely cellulitis.  Continue current treatment.  Monitor closely.

## 2022-08-08 NOTE — PROGRESS NOTE ADULT - PROBLEM SELECTOR PLAN 1
-Test BG q1h while on insulin drip. Once back on SQ insulin switch to AC/HS/2am  -C/w insulin drip til BG <180s x2.  -If pt eating consistently would benefic from sq insulin therapy instead of insulin drip:  Lantus 5 units q hs plus Admelog 2 units ac meals if eating  Admelog low dose correction scale AC and low HS/2am scales  -Please contact endo team with  changes on steroid doses/diet to make appropriate insulin doses adjustments.   Discharge  plan to rehab, c/w basal bolus insulin final doses as above if FBG at goal  Titrate further at rehab as necessary .   Outpatient f/u with Endocrinologist Dr. Lincoln. [address] [facility] [address]. Phone  Needs apt at time of discharge  -Needs optho/renal/cardiac f/u as out pt  -Make sure pt has insulin and DM supplies at time of discharge. -Test BG q1h while on insulin drip. Once back on SQ insulin switch to AC/HS/2am  -C/w insulin drip til BG <180s x2.  -If pt eating consistently would benefic from sq insulin therapy instead of insulin drip:  Lantus 6 units q hs plus Admelog 3 units ac meals if eating  Admelog moderate dose correction scales AC and low dose HS/2am scales  -Please contact endo team with changes on steroid doses/diet to make appropriate insulin doses adjustments.   -Spoke to RN to document insulin administration doses in EMR.   Discharge  plan to rehab, c/w basal bolus insulin final doses as above if FBG at goal  Titrate further at rehab as necessary .   Outpatient f/u with Endocrinologist Dr. Lincoln. [address] [facility] [address]. Phone  Needs apt at time of discharge  -Needs optho/renal/cardiac f/u as out pt  -Make sure pt has insulin and DM supplies at time of discharge.

## 2022-08-08 NOTE — PROCEDURE NOTE - NSPOSTCAREGUIDE_GEN_A_CORE
Verbal/written post procedure instructions were given to patient/caregiver/Instructed patient/caregiver to follow-up with primary care physician/Instructed patient/caregiver regarding signs and symptoms of infection
Care for catheter as per unit/ICU protocols
Verbal/written post procedure instructions were given to patient/caregiver
Care for catheter as per unit/ICU protocols
Verbal/written post procedure instructions were given to patient/caregiver/Instructed patient/caregiver to follow-up with primary care physician/Instructed patient/caregiver regarding signs and symptoms of infection/Keep the cast/splint/dressing clean and dry

## 2022-08-08 NOTE — PROGRESS NOTE ADULT - SUBJECTIVE AND OBJECTIVE BOX
Transplant Surgery - Multidisciplinary Progress Note  --------------------------------------------------------------  DDRT     4/21/2022             Present:  Patient seen with multidisciplinary team including Transplant Surgeon: Dr. Tena,  Nephrologist: Dr. NORBERTO Lomeli, transplant pharmacist CELIA Martinez, NP Jensen. Disciplines not in attendance will be notified of the plan.      66M with PMH: DM type II, HTN, CAD s/p CABG in 2020, AFib on Eliquis, CVA in 2019 due to Afib, Seizure d/o following CVA, last episode was on 4/8/22, h/o GIB in 2/2022 EGD with duodenal ulcer.  ESRD due to DM was on HD since 2019.     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  Donor was 58 , KDPI 82%, DCD, single vessels and ureter, HLA mismatch 1, 2, 2. No DSA, cPRA 0%. 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 Re-admitted in May for anemia in the setting of large perinephric hematoma s/p evacuation and repair of arterial anastomosis on 5/2/22. Intra operative biopsy showed no rejection, Creatinine ranging ~2mg/dL.    In Rehab:  He was recently dx with klebsiella UTI with Ertapenem - then switched to Levaquin    He also had parainfluenza pneumonia. Was at rehab progressing well.      Hospital course:  - 6/10: transferred from rehab facility for seizure status epilepticus. Intubated for respiratory protection and transferred to MICU.  EEG showed no seizure activity. Neuro consulted.   - 6/11: Extubated. Found to have R pleural effusion. s/p Thoracentesis 1.3L. Eliquis changed to heparin drip.  Post procedure drop in H&H. 5u PRBC, 2 u FFP.   - 6/11 evening: Transferred to SICU from MICU for further care in setting of hypoxia, significant R pleural effusion, anemia and hemodynamic instability  - 6/11 Intubated at 9pm and sedated on Precedex.  CT placed, 600ml blood drained.  1L total overnight, started pressors  - 6/11 Received Protamine for PTT >100 (was on Heparin drip started for AF), 2 u FFP and 5u PRBC total  - 6/13 s/p VATS 2.2L old blood removed; second chest tube placed  - 6/18-19: chest tubes removed  - 6/21: ?PRES vs. chronic ischemic changes on MRI, off Envarsus, switched to Belatacept 6/23 with good graft function  - 6/25: Tx to SICU for refractory seizures, improved with IV antiepileptic regimen  - 7/10: Downgraded from SICU to floor.   - 7/11: OR-->URETERAL STENT REMOVED  - 7/15: ESBL Kleb UTI (50-90K), completed Ertapenem x 3 Days  - 7/25: Tx to SICU for Sepsis/ elevated LFTS  - 7/27: Shiley placed for HD  - 7/28: ongoing fevers -> started Ertapenem/Fluc  - 7/29: HD restarted + 1U PRBC.  IR bx with 2A rejection, started pulse steroids. LFTs have improved  - 8/4: refractory seizures    Interval events:   -No seizures seen on EEG, no obvious seizures overnight. On Fosphenytoin 200 BID  -s/p HD 8/6. Planning to change permcath 2/2 poor function by IR today  -more alert passed [employer] DC yesterday and started PO diet  -R sided erythema around wound has not expanded outside the marked area.       Immunosuppression  Induction:  Thymoglobulin                                        Maintenance:  - Belatacept: 6/23, 7/4, 7/8, 7/18. 8/1, now discontinued  - Now on Sirolimus as of 8/2  - Dec cellcept to 250 BID.   - Pred taper   - Ongoing monitoring for signs of rejection.    Potential Discharge date: pending clinical improvement.     Education:  Medications    Plan of care:  See Below       MEDICATIONS  (STANDING):  amLODIPine   Tablet 10 milliGRAM(s) Oral daily  chlorhexidine 2% Cloths 1 Application(s) Topical <User Schedule>  doxazosin 4 milliGRAM(s) Oral <User Schedule>  epoetin cortney-epbx (RETACRIT) Injectable 58209 Unit(s) SubCutaneous every 7 days  finasteride 5 milliGRAM(s) Oral daily  fosphenytoin IVPB 200 milliGRAM(s) PE IV Intermittent every 12 hours  hydrALAZINE 25 milliGRAM(s) Oral every 8 hours  insulin glargine Injectable (LANTUS) 5 Unit(s) SubCutaneous <User Schedule>  insulin lispro (ADMELOG) corrective regimen sliding scale   SubCutaneous three times a day before meals  insulin lispro (ADMELOG) corrective regimen sliding scale   SubCutaneous at bedtime  insulin lispro Injectable (ADMELOG) 3 Unit(s) SubCutaneous three times a day before meals  levothyroxine Injectable 37.5 MICROGram(s) IV Push at bedtime  melatonin 6 milliGRAM(s) Oral at bedtime  mycophenolate mofetil 250 milliGRAM(s) Oral <User Schedule>  pantoprazole    Tablet 40 milliGRAM(s) Oral before breakfast  polyethylene glycol 3350 17 Gram(s) Oral daily  predniSONE   Tablet 20 milliGRAM(s) Oral daily  senna 2 Tablet(s) Oral at bedtime  sirolimus 2.5 milliGRAM(s) Oral <User Schedule>  trimethoprim   80 mG/sulfamethoxazole 400 mG 1 Tablet(s) Oral daily  valGANciclovir 450 milliGRAM(s) Oral <User Schedule>    MEDICATIONS  (PRN):      PAST MEDICAL & SURGICAL HISTORY:  Hypertension      Diabetes      Dyslipidemia      CAD (Coronary Artery Disease)  with Stents in 06/2009 and 6/2019, s/p off-pump C3L on 8/13/20      Hypothyroidism      CVA (cerebral vascular accident)  12/13/19 with residual bilateral weakness      Anemia      PEG (percutaneous endoscopic gastrostomy) status  removed July 2020      Intubation of airway performed without difficulty  Dec 2019  CVA c/b status epilepticus requiring intubation and PEG in 12/2019-      ESRD on dialysis  M/W/F      Seizures  after CVA, last seizure was last week 4/07/22      History of insertion of stent into coronary artery bypass graft  2009 and 2019      S/P CABG x 3  off pump C3L on 8/13/20          Vital Signs Last 24 Hrs  T(C): 36.7 (08 Aug 2022 11:31), Max: 38.3 (08 Aug 2022 02:50)  T(F): 98.1 (08 Aug 2022 11:31), Max: 100.9 (08 Aug 2022 02:50)  HR: 60 (08 Aug 2022 11:31) (53 - 62)  BP: 162/69 (08 Aug 2022 11:31) (128/60 - 183/84)  BP(mean): 99 (08 Aug 2022 11:00) (86 - 120)  RR: 18 (08 Aug 2022 11:31) (13 - 22)  SpO2: 97% (08 Aug 2022 11:31) (96% - 100%)    Parameters below as of 08 Aug 2022 11:00  Patient On (Oxygen Delivery Method): room air        I&O's Summary    07 Aug 2022 07:01  -  08 Aug 2022 07:00  --------------------------------------------------------  IN: 714 mL / OUT: 30 mL / NET: 684 mL    08 Aug 2022 07:01  -  08 Aug 2022 12:20  --------------------------------------------------------  IN: 120 mL / OUT: 0 mL / NET: 120 mL                              8.7    19.54 )-----------( 361      ( 08 Aug 2022 05:42 )             26.8     08-08    131<L>  |  92<L>  |  72<H>  ----------------------------<  149<H>  4.9   |  22  |  4.58<H>    Ca    8.3<L>      08 Aug 2022 05:42  Phos  4.8     08-08  Mg     2.2     08-08    TPro  5.7<L>  /  Alb  2.1<L>  /  TBili  0.2  /  DBili  0.1  /  AST  18  /  ALT  15  /  AlkPhos  278<H>  08-08          Culture - Blood (collected 08-03-22 @ 23:46)  Source: .Blood Blood  Preliminary Report (08-05-22 @ 03:01):    No growth to date.    Culture - Blood (collected 08-03-22 @ 23:46)  Source: .Blood Blood  Preliminary Report (08-05-22 @ 03:01):    No growth to date.              REVIEW OF SYSTEMS  --------------------------------------------------------------------------------  unable to obtain full accurate ROS, but denies CP, SOB.    PHYSICAL EXAM:  Constitutional: awake, weak, not coherent  Eyes: Anicteric  ENMT: nc/at  Respiratory: not tachypneic, non-labored on RA    Cardiovascular: normotensive, regular rate on monitor   Gastrointestinal: Soft, non distended, nontender, RLQ incisional scar healed, some erythema.  anasarca throughout  Genitourinary: anuric on HD   Extremities: SCD's in place and working bilaterally, overall with worsening edema  Vascular: Palpable dp pulses bilaterally  Neurological: AAOx3  Skin: no rashes, ulcerations or lesions  Musculoskeletal: Moving all extremities  Psychiatric: overall calm, but with  periods of agitation

## 2022-08-08 NOTE — PROGRESS NOTE ADULT - ASSESSMENT
66 yr old Male with PMHx ESRD s/p permacath removal 5/10/22 s/p Rt DDRT 4/21/22,  CVA (2019), Afib on apixaban, seizure activity, CAD s/p stents, CABG (2020), HTN, HLD, DM on insulin, gastric/duodenal ulcer, recent hospitalization 4/28/22 -5/10/22 with weakness/anemia found to have perinephric hematoma requiring evacuation and repair of bleeding arterial anastomosis. Curently being treated for UTI with Levaquin Today after developing multiple sz episodes refractory to 5 mg versed IM (EMS) and 2 mg ativan IV in E.D. concerning for status epilepticus requiring intubation for hypoxic respiratory  failure. MICU Consult was called for hypoxic respiratory failure secondary to status epilepticus.  Nephrology consulted for renal failure.     A/P  DDRT on 04/21/22  Course complicated by DGF, was on HD until 4/29/22. Readmitted in May for anemia in the setting of large perinephric hematoma s/p evacuation and repair of arterial anastomosis on 5/02/22. Intra operative biopsy showed no rejection.  ANA was initially sec to  hemodynamic change   stent removal 07/12/22  No hydronephrosis noted on renal US. UA done on 7/25/22 showed LE and pyuria.   allograft US 07/28/22 shows Interval increase in the resistive indices and peak systolic velocities   compared to prior study performed. Short-term imaging follow-up   if clinically indicated.  Grade 1a rejection (biopsy on 7/29) - s/p pulse dose steroids  no DSA.   continue Immunosuppression per transplant team  last HD 8/6- plan for HD today ; HD per transplant team   graft doppler stable, stable perinephric collection pending read from yesterday  transplant team on board   monitor BMP, U/O     HTN   acceptable   manage by transplant team    monitor BP closely

## 2022-08-08 NOTE — PRE PROCEDURE NOTE - GENERAL PROCEDURE NAME
Renal transplant biopsy and ureteral obstruction evaluation
non tunneled HD catheter placement
Exchange of left EJ non-tunneled HD Shiley catheter

## 2022-08-08 NOTE — PROCEDURE NOTE - NSSITEPREP_SKIN_A_CORE
chlorhexidine
Adherence to aseptic technique: hand hygiene prior to donning barriers (gown, gloves), don cap and mask, sterile drape over patient
chlorhexidine

## 2022-08-08 NOTE — PRE PROCEDURE NOTE - PRE PROCEDURE EVALUATION
Interventional Radiology    HPI: 66y Male with ESRD s/p DDRT 22 c/b rejection. IR placed left EJ non-tunneled 14F 20cm HD catheter on  (RIJ is occluded). Dialysis on  had inadequate flow rates, 200 as per nephrology. IR was consulted for shiley exchange. Patient presents today to have this completed.     Allergies:   Medications (Abx/Cardiac/Anticoagulation/Blood Products)  amLODIPine   Tablet: 10 milliGRAM(s) Oral ( @ 07:15)  amLODIPine   Tablet: 10 milliGRAM(s) Oral ( @ 05:18)  doxazosin: 4 milliGRAM(s) Oral ( @ 05:18)  doxazosin: 4 milliGRAM(s) Oral ( @ 05:45)  hydrALAZINE: 25 milliGRAM(s) Oral ( @ 13:33)  hydrALAZINE: 25 milliGRAM(s) Oral ( @ 05:46)  trimethoprim   80 mG/sulfamethoxazole 400 m Tablet(s) Oral ( @ 11:27)  trimethoprim  40 mG/sulfamethoxazole 200 mG Suspension: 80 milliGRAM(s) Oral ( @ 12:09)  valGANciclovir: 450 milliGRAM(s) Oral ( @ 11:28)  vancomycin  IVPB: 250 mL/Hr IV Intermittent ( @ 07:46)    Data:    T(C): 36.7  HR: 60  BP: 162/69  RR: 18  SpO2: 97%    Exam  General: No acute distress  Chest: Non labored breathing  Abdomen: Non-distended  Extremities: No swelling, warm    -WBC 19.54 / HgB 8.7 / Hct 26.8 / Plt 361  -Na 131 / Cl 92 / BUN 72 / Glucose 149  -K 4.9 / CO2 22 / Cr 4.58  -ALT 15 / Alk Phos 278 / T.Bili 0.2  -INR1.16    Plan: 66y Male presents for Exchange of left EJ non-tunneled HD Shiley catheter  -Risks/Benefits/alternatives explained with the patient and/or healthcare proxy and witnessed informed consent obtained.    Interventional Radiology    HPI: 66y Male with ESRD s/p DDRT 22 c/b rejection. IR placed left EJ non-tunneled 14F 20cm HD catheter on  (RIJ is occluded). Dialysis on  had inadequate flow rates, 200 as per nephrology. IR was consulted for shiley exchange. Patient presents today for shiley exchange.     Allergies:   Medications (Abx/Cardiac/Anticoagulation/Blood Products)  amLODIPine   Tablet: 10 milliGRAM(s) Oral ( @ 07:15)  amLODIPine   Tablet: 10 milliGRAM(s) Oral ( @ 05:18)  doxazosin: 4 milliGRAM(s) Oral ( @ 05:18)  doxazosin: 4 milliGRAM(s) Oral ( @ 05:45)  hydrALAZINE: 25 milliGRAM(s) Oral ( @ 13:33)  hydrALAZINE: 25 milliGRAM(s) Oral ( @ 05:46)  trimethoprim   80 mG/sulfamethoxazole 400 m Tablet(s) Oral ( @ 11:27)  trimethoprim  40 mG/sulfamethoxazole 200 mG Suspension: 80 milliGRAM(s) Oral ( @ 12:09)  valGANciclovir: 450 milliGRAM(s) Oral ( @ 11:28)  vancomycin  IVPB: 250 mL/Hr IV Intermittent ( @ 07:46)    Data:    T(C): 36.7  HR: 60  BP: 162/69  RR: 18  SpO2: 97%    Exam  General: No acute distress  Chest: Non labored breathing  Abdomen: Non-distended  Extremities: No swelling, warm    -WBC 19.54 / HgB 8.7 / Hct 26.8 / Plt 361  -Na 131 / Cl 92 / BUN 72 / Glucose 149  -K 4.9 / CO2 22 / Cr 4.58  -ALT 15 / Alk Phos 278 / T.Bili 0.2  -INR1.16    Plan: 66y Male presents for Exchange of left EJ non-tunneled HD Shiley catheter          PA attestation:     I have personally provided 15 minutes of care time excluding time spent on separate procedures. I have seen, examined and participated in the care of this patient. I have reviewed all pertinent clinical information, including history, physical exam, plan which I have reviewed and edited where appropriate.    -Risks/Benefits/alternatives explained with the patient and/or healthcare proxy and witnessed informed consent obtained from patient son Chayito Castelan.     Please call IR at extension [phone] with any questions, concerns, or issues regarding above.      Sruthi Arrieta PA-C

## 2022-08-08 NOTE — PROGRESS NOTE ADULT - NSPROGADDITIONALINFOA_GEN_ALL_CORE
-Plan discussed with pt/team.  Contact info: [phone] (24/7). pager [phone]  [email or web address] password [email or web address]  Spent  over 25 minutes assessing pt/labs/meds and discussing plan of care with primary team  Adjusting insulin  Discharge plan  Follow up care -Plan discussed with pt/team.  Contact info: [phone] (24/7). pager [phone]  [email or web address] password [email or web address]  Spent 28 minutes assessing pt/labs/meds and discussing plan of care with primary team  Adjusting insulin  Discharge plan  Follow up care

## 2022-08-08 NOTE — PRE-OP CHECKLIST - SELECT TESTS ORDERED
CBC/CMP/PT/PTT/INR/Type and Screen/COVID-19
CBC/CMP/PT/PTT/INR/COVID-19
reviewed/CBC/CMP/PT/PTT/INR/COVID-19

## 2022-08-08 NOTE — PROGRESS NOTE ADULT - NS ATTEND AMEND GEN_ALL_CORE FT
67 y/o male w/ a PMHx of CAD s/p CABG, AF on Eliquis c/b CVA c/b seizures, HTN, HLD, DM type II, hypothyroidism, GI bleed due to a duodenal ulcer, and ESRD s/p DDRT 4/21/22 c/b DGF requiring HD & large perinephric hematoma 5/22 s/p evacuation w/ revision of arterial anastomosis who presented in status epilepticus  On fosphenytoin without any further seizure. More awake  Hemodynamically stable, off Coreg sec to bradycardia  Saturating well on RA  Passed swallow eval on PO, off NGT  Antirejection meds  ppx abx  For HD catheter replacement, possible post procedure  HD today

## 2022-08-09 NOTE — PROGRESS NOTE ADULT - PROBLEM SELECTOR PLAN 1
-Test BG AC/HS/2am  -C/w Lantus 12 units q hs for now. Will adjust as needed  -Add Admelog 3-4 units ac meals if eating  -C/w Admelog moderate dose correction scales AC and low dose HS/2am scales  -Please contact endo team with changes on steroid doses/diet to make appropriate insulin doses adjustments.    Discharge  plan to rehab, c/w basal bolus insulin final doses as above if FBG at goal  Titrate further at rehab as necessary .   Outpatient f/u with Endocrinologist Dr. Lincoln. [address] [facility] [address]. Phone  Needs apt at time of discharge  -Needs optho/renal/cardiac f/u as out pt  -Make sure pt has insulin and DM supplies at time of discharge.

## 2022-08-09 NOTE — PROGRESS NOTE ADULT - ATTENDING COMMENTS
Renal transplant recipient with prolonged admission now with RLQ Abdominal wall erythema, leukocytosis and fevers  Developed following 10 day course of Ertapenem for transplant pyelonephritis  Induration / tenderness improved with Vancomycin but continued to spread in terms of extent  s/p Dermatology skin biopsy for pathology and culture on 8/8  Would continue Vancomycin by level in the interim  If worsening clinical status or examination (despite seizure risk) would start Meropenem     I will continue to follow. Please feel free to contact me with any further questions.    Carlton Hoover M.D.  [facility] Division of Infectious Disease  8AM-5PM Monday - Friday: Available on [employer] Teams  After Hours and Holidays (or if no response on [employer] Teams): Please contact the Infectious Diseases Office at [phone]    The above assessment and plan were discussed with Dr Daniel Tena

## 2022-08-09 NOTE — PROGRESS NOTE ADULT - ASSESSMENT
Patient is a 67 y/o male w/ a PMHx of CAD s/p CABG, AF on Eliquis c/b CVA c/b seizures, HTN, HLD, DM type II, hypothyroidism, GI bleed due to a duodenal ulcer, and ESRD s/p DDRT 4/21/22 c/b DGF requiring HD & large perinephric hematoma 5/22 s/p evacuation w/ revision of arterial anastomosis who presented in status epilepticus 6/10 from rehab requiring intubation for airway protection with a hospital course c/b large right pleural effusion s/p thoracentesis c/b hemothorax while being anticoagulated & requiring intubation for airway protection s/p chest tube placement w/ CT demonstrating retained hemothorax s/p right VATS, status epilepticus again causing AMS requiring intubation for airway protection again, delirium, dysphagia requiring NGT placement for enteral access, hyperkalemia, and poor glycemic control. 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     Neuro: rencephalopathy improved, hx of seizure, with refractory seizure  - c/w Fosphenytoin 200mg IV BID  - S/p Fycompa, previously successful antiepileptic therapy with notable psych changes   - Multimodal pain control w/Tylenol  - Melatonin with Protected sleep to improve delirium  - CT head (08/04): Negative for  acute hemorrhage  - Neurology following, recs appreciated     Resp: prevent atelectasis  - Maintain SpO2 >92%   - Out of bed to chair, ambulate as tolerated, and incentive spirometry to prevent atelectasis    CVS: hx of HTN, s/p CABG, AF on Eliquis  - Maintain MAP > 65  - Continue Hydralazine 25mg q8h and Amlodipine 10mg qd   -carvedilol on hold 2/2 bradycardia  - lactate clearted    GI: transaminitis improved  - passed swallow, non soft and bite sized diet  - Protonix 40mg IV daily for ppx   - Bowel regimen with Miralax,  senna   - Will follow up CT A/P performed to evaluate RLQ cellulitis. Preliminary low concerns for necrotising fasc.     Renal: DDRT 4/21 cb DGF, now c/b acute rejection s/p IR renal bx 7/29  - IR exchanged shiley in the day. Received HD ON and had 2L removed  - Doxazosin + Proscar  - Continue immunosuppression with sirolimus 2.5mg bid , cellcept 250mg bid prednisone 20mg     Heme:  - Monitor CBC and coags  - SCDs for mechanical VTE ppx   -lovenox held / AC on hold  - DVT studies ordered. Follow up in the day.     ID: ESBL klebsiella UTI (7/15 + UCx, 7/25 +UCx)  - Blood cx negative 7/25, 7/27, 7/30  - F/u Repeat blood cx ( 08/04) with onset of seziures   -c/w Vanco by level due to leukocytosis with surrounding erythema of Right groin incision per Transplant ID.   - Bactrim and Valcyte for immuno ppx      Endo: hx of T2DM, Hypothyroidism,   - Insulin gtt for improved glycemic control   - Continue Home Synthroid 37.5mg ivp    Code status: Full Code   Dispo: SICU

## 2022-08-09 NOTE — PROGRESS NOTE ADULT - SUBJECTIVE AND OBJECTIVE BOX
Interventional Radiology Follow-Up Note.    This is a 66y Male s/p LEJ shiley exchange on 8/8 in Interventional Radiology.     Patient seen and examined @ bedside. No complaint offered.    Medication:     amLODIPine   Tablet: (08-09)  doxazosin: (08-09)  hydrALAZINE: (08-09)  hydrALAZINE: (08-07)  trimethoprim   80 mG/sulfamethoxazole 400 mG: (08-09)  valGANciclovir: (08-08)  vancomycin  IVPB: (08-08)    Vitals:   T(F): 98.6, Max: 99.7 (23:00)  HR: 60  BP: 131/59  RR: 15  SpO2: 96%    Physical Exam:  General: Nontoxic, in NAD.  Neck: Left neck HD catheter site dressing c/d/i. Site soft w/o evidence of hematoma noted. nttp      Aspartate Aminotransferase (AST/SGOT): 16 U/L (08-09-22 @ 05:54)  Alanine Aminotransferase (ALT/SGPT): 14 U/L (08-09-22 @ 05:54)  Aspartate Aminotransferase (AST/SGOT): 18 U/L (08-08-22 @ 05:42)  Alanine Aminotransferase (ALT/SGPT): 15 U/L (08-08-22 @ 05:42)        LABS:  Na: 131 (08-09 @ 05:54), 132 (08-08 @ 23:44), 129 (08-08 @ 17:19), 131 (08-08 @ 05:42), 132 (08-07 @ 17:20), 134 (08-07 @ 06:05)  K: 4.0 (08-09 @ 05:54), 4.2 (08-08 @ 23:44), 5.0 (08-08 @ 17:19), 4.9 (08-08 @ 05:42), 5.2 (08-07 @ 17:20), 4.3 (08-07 @ 06:05)  Cl: 94 (08-09 @ 05:54), 94 (08-08 @ 23:44), 90 (08-08 @ 17:19), 92 (08-08 @ 05:42), 92 (08-07 @ 17:20), 93 (08-07 @ 06:05)  CO2: 23 (08-09 @ 05:54), 23 (08-08 @ 23:44), 22 (08-08 @ 17:19), 22 (08-08 @ 05:42), 21 (08-07 @ 17:20), 22 (08-07 @ 06:05)  BUN: 51 (08-09 @ 05:54), 44 (08-08 @ 23:44), 76 (08-08 @ 17:19), 72 (08-08 @ 05:42), 62 (08-07 @ 17:20), 58 (08-07 @ 06:05)  Cr: 3.43 (08-09 @ 05:54), 3.23 (08-08 @ 23:44), 4.70 (08-08 @ 17:19), 4.58 (08-08 @ 05:42), 3.98 (08-07 @ 17:20), 3.68 (08-07 @ 06:05)  Glu: 130(08-09 @ 05:54), 147(08-08 @ 23:44), 275(08-08 @ 17:19), 149(08-08 @ 05:42), 297(08-07 @ 17:20), 183(08-07 @ 06:05)    Hgb: 8.7 (08-09 @ 05:52), 8.7 (08-08 @ 05:42), 9.3 (08-07 @ 06:05)  Hct: 26.9 (08-09 @ 05:52), 26.8 (08-08 @ 05:42), 28.9 (08-07 @ 06:05)  WBC: 22.22 (08-09 @ 05:52), 19.54 (08-08 @ 05:42), 19.38 (08-07 @ 06:05)  Plt: 321 (08-09 @ 05:52), 361 (08-08 @ 05:42), 365 (08-07 @ 06:05)    INR: 1.24 08-09-22 @ 05:52, 1.16 08-08-22 @ 05:42, 1.26 08-07-22 @ 06:05  PTT: 31.3 08-09-22 @ 05:52, 30.7 08-08-22 @ 05:42, 30.3 08-07-22 @ 06:05          LIVER FUNCTIONS - ( 09 Aug 2022 05:54 )  Alb: 1.9 g/dL / Pro: 5.6 g/dL / ALK PHOS: 261 U/L / ALT: 14 U/L / AST: 16 U/L / GGT: x              Assessment/Plan:  66y Male s/p LEJ shiley exchange on 8/8 in Interventional Radiology.      - Okay to use catheter.  - Should patient require longterm HD. Once Disposition in place and no evidence of active infection please consult IR for conversion to tunnelled HD catheter.   - IR will sign off.   -Global care per primary team.     Please call IR at  [phone] with any questions, concerns, or issues regarding above.    Also available on Teams.

## 2022-08-09 NOTE — PROGRESS NOTE ADULT - SUBJECTIVE AND OBJECTIVE BOX
DIABETES FOLLOW UP NOTE: Saw pt earlier today    Chief Complaint: Endocrine consult requested for management of T2DM    INTERVAL HX: Saw pt in ICU sleepy at time of visit. Per RN, pt tolerating POs with BG at goal overnight while on insulin drip requiring 0.5 units to 1.5 units. Drip discontinued this am and NPH 8 units was given by primary team. Also noted order for Lantus tonight. Pt remains on Prednisone 20mg daily. BGs high 100s to 200s after drip discontinued. No hypoglycemia.        Review of Systems:  General: As above  Unable, pt sleepy and no answering questions    Allergies    No Known Allergies    Intolerances      MEDICATIONS:  finasteride 5 milliGRAM(s) Oral daily  fluconAZOLE   Tablet 400 milliGRAM(s) Oral daily  insulin glargine Injectable (LANTUS) 12 Unit(s) SubCutaneous at bedtime  insulin lispro (ADMELOG) corrective regimen sliding scale   SubCutaneous three times a day before meals  insulin lispro (ADMELOG) corrective regimen sliding scale   SubCutaneous at bedtime  levothyroxine 50 MICROGram(s) Oral daily  predniSONE   Tablet 20 milliGRAM(s) Oral daily  sirolimus 2.5 milliGRAM(s) Oral <User Schedule>  trimethoprim   80 mG/sulfamethoxazole 400 mG 1 Tablet(s) Oral daily  valGANciclovir 450 milliGRAM(s) Oral <User Schedule>      PHYSICAL EXAM:  VITALS: T(C): 36.4 (08-09-22 @ 15:00)  T(F): 97.6 (08-09-22 @ 15:00), Max: 99.7 (08-08-22 @ 23:00)  HR: 67 (08-09-22 @ 17:00) (55 - 72)  BP: 186/75 (08-09-22 @ 17:00) (131/59 - 218/94)  RR:  (13 - 26)  SpO2:  (95% - 99%)  Wt(kg): --  GENERAL: Male laying in bed in NAD  Abdomen: Soft, nontender, non distended  Extremities: Warm, trace edema in LEs. LUE + edema  NEURO: Sleeping    LABS:  POCT Blood Glucose.: 229 mg/dL (08-09-22 @ 17:05)  POCT Blood Glucose.: 193 mg/dL (08-09-22 @ 12:02)  POCT Blood Glucose.: 195 mg/dL (08-09-22 @ 11:08)  POCT Blood Glucose.: 159 mg/dL (08-09-22 @ 10:05)  POCT Blood Glucose.: 137 mg/dL (08-09-22 @ 09:01)  POCT Blood Glucose.: 132 mg/dL (08-09-22 @ 08:24)  POCT Blood Glucose.: 138 mg/dL (08-09-22 @ 07:12)  POCT Blood Glucose.: 143 mg/dL (08-09-22 @ 05:59)  POCT Blood Glucose.: 127 mg/dL (08-09-22 @ 04:59)  POCT Blood Glucose.: 106 mg/dL (08-09-22 @ 03:57)  POCT Blood Glucose.: 118 mg/dL (08-09-22 @ 03:04)  POCT Blood Glucose.: 130 mg/dL (08-09-22 @ 02:02)  POCT Blood Glucose.: 138 mg/dL (08-09-22 @ 00:59)  POCT Blood Glucose.: 150 mg/dL (08-08-22 @ 23:58)  POCT Blood Glucose.: 137 mg/dL (08-08-22 @ 23:05)  POCT Blood Glucose.: 136 mg/dL (08-08-22 @ 21:57)  POCT Blood Glucose.: 125 mg/dL (08-08-22 @ 20:59)  POCT Blood Glucose.: 154 mg/dL (08-08-22 @ 20:02)  POCT Blood Glucose.: 205 mg/dL (08-08-22 @ 18:49)  POCT Blood Glucose.: 262 mg/dL (08-08-22 @ 18:09)  POCT Blood Glucose.: 259 mg/dL (08-08-22 @ 17:28)  POCT Blood Glucose.: 269 mg/dL (08-08-22 @ 13:54)  POCT Blood Glucose.: 145 mg/dL (08-08-22 @ 08:17)  POCT Blood Glucose.: 151 mg/dL (08-08-22 @ 05:32)  POCT Blood Glucose.: 230 mg/dL (08-07-22 @ 22:19)  POCT Blood Glucose.: 279 mg/dL (08-07-22 @ 16:46)  POCT Blood Glucose.: 227 mg/dL (08-07-22 @ 12:05)  POCT Blood Glucose.: 172 mg/dL (08-07-22 @ 05:22)                            8.7    22.22 )-----------( 321      ( 09 Aug 2022 05:52 )             26.9       08-09    131<L>  |  94<L>  |  51<H>  ----------------------------<  130<H>  4.0   |  23  |  3.43<H>    eGFR: 19<L>    Ca    8.3<L>      08-09  Mg     2.0     08-09  Phos  3.6     08-09    TPro  5.6<L>  /  Alb  1.9<L>  /  TBili  0.2  /  DBili  0.2  /  AST  16  /  ALT  14  /  AlkPhos  261<H>  08-09       A1C with Estimated Average Glucose Result: 6.0 % (06-30-22 @ 05:49)      Estimated Average Glucose: 126 mg/dL (06-30-22 @ 05:49)        07-27 Chol -- Direct LDL -- LDL calculated -- HDL -- Trig 118

## 2022-08-09 NOTE — PROGRESS NOTE ADULT - ASSESSMENT
66M with h/o DM type II, HTN, CAD s/p CABG in 2020, Afib on Eliquis, CVA in 2019 due to Afib, Seizure d/o (last episode was on 4/8/22), h/o GI bleed in 2/2022 EGD with duodenal ulcer and ESRD on HD s/p R DDRT from DCD donor on 4/21/22 complicated by DGF requiring HD until 4/29/22 who presented with status epilepticus in setting of UTI/antibiotics and sub-therapeutic valproic acid level. Transferred to SICU on 7/25 with signs of sepsis, now with refractory seizures    Seizure Disorder:  - refractory seizures o/n.  Neuro/Epilepsy Teams following. Follow recommendations closely. continue EEG. Avoid Fycompa 2/2 agitation in past  - prior MRI head 6/27 with less concerns for PRES, likely ischemic changes  - o/n CTH 8/3 with no acute findings  - replete lytes aggressively  -FU fosphenytoin level  -Send Free fosphenytoin level    R DCD DDRT 4/21/22 now with 2A ACR  - 2A ACR: s/p pulse steroids, now on 20mg QD will keep   - will continue HD daily per renal given persistent fluid overload and anuria  - On vanc by level for ppx (level today 15.6, not dosed today). Bld cx from 8/3 w/ NGTD  - graft doppler stable, stable perinephric collection last US 8/5 and CT scan 8/8  - S/P removal of ureteral stent on 7/12  - Continue Doxazosin/Proscar for BPH  - OOB with RN/PT  - DSA negative   - ADAT  - follow up punch biopsy of the wound  -Start DVT ppx with SQD  -Send fungitel  -FU LE studies    Immunosuppression:   - Belatacept: 6/23, 7/4, 7/8. Re-loaded 7/8,  as there was a gap between doses 2/2 seizures. 7/18,  last dose 8/1  - on Sirolimus as of  8/2 per level    - Cellcept 250 BID  - continue Pred 20mg QD  PPX: start diflucan 400mg QD, bactrim, valcyte (MWF)  - PPx: Valcyte/ bactrim     DM  - steroid induced hyperglycemia, improving with Endocrine management    AFib/R IJ DVT   - Eliquis with ~40% less efficacy while on fosphenytoin.    - Lovenox held for ANA and transaminitis   - Hold HSQ for now given hematoma  - rate controlled  - will discuss A/C plans daily    HTN:  - Nifedipine/Cardura/Hydralazline  - off Coreg due to bradycardia    DISPO  - continue SICU care

## 2022-08-09 NOTE — PROGRESS NOTE ADULT - SUBJECTIVE AND OBJECTIVE BOX
HISTORY:  67 y/o M with PMH: DM type II, HTN, CAD s/p CABG in 2020, Afib on Eliquis, CVA in 2019 due to Afib, Seizure d/o following CVA last episode was on 4/8/22, h/o GI bleed in 2/2022 EGD with duodenal ulcer.  ESRD due to DM was on HD since 2019.     s/p R DCD DDRT (1A/1V/1U + stent)  on 4/21/22.    Donor was 57 , KDPI 82 HLA mismatch 1, 2, 2. No DSA, cPRA 0%. CMV +/+  Course c/b  by DGF, was on HD until 4/29/22.     Re-admitted in May for anemia in the setting of large perinephric hematoma s/p evacuation and repair of arterial anastomosis on 5/2/22. Intra operative biopsy showed no rejection, Creatinine ranging ~2mg/dL.    In Rehab: recently dx klebsiella UTI rx with ertapenem - then switched to Levaquin day #6.    He also had parainfluenza pneumonia. Was at rehab progressing well.      - admitted with status epilepticus  - 6/11 R pleural effusion - s/p thoracentesis 1.3L -> bleeding post procedure  - 6/11 re-intubated, CT placed, 600ml hemothorax drained.    - 6/15 s/p VATS 2.2L old blood removed; second chest tube placed   - 6/18-19 chest tubes removed  - 6/21: PRES, off ENV  - 6/23: started Belatacept  - 6/26: intubated for AMS and airway protection  - 6/28: extubated  - 6/29: LP negative, antibiotics D/Agustin  - seizure free since 6/27  - 7/11: ureteral stent removed  - ESBL Kleb UTI, Erta x 3D  - 7/25 transferred to SICU for sepsis/elevated LFTs  - 7/27 HD restarted via L shiley  - 7/29 IR bx with 2A ACR, s/p pulse steroids  - 8/4 refractory seizures      24 HOUR EVENTS:  - HD ON output 2L  - Cellulitis over RLQ extending past marked area. CT A/P preliminary read not concerning for nec fascitis at this time, and small ascites high over transplanted kidney.  -IR wilder sanches in the day  -DVT studies ordered, follow up    NEURO  Exam: can follow commands  Meds: acetaminophen     Tablet .. 650 milliGRAM(s) Oral every 6 hours PRN Mild Pain (1 - 3)  fosphenytoin IVPB 200 milliGRAM(s) PE IV Intermittent every 12 hours  melatonin 6 milliGRAM(s) Oral at bedtime      RESPIRATORY  RR: 20 (08-09-22 @ 04:00) (12 - 26)  SpO2: 98% (08-09-22 @ 04:00) (96% - 100%)  Wt(kg): --  Exam: clear lung breath sounds      Meds:     CARDIOVASCULAR  HR: 63 (08-09-22 @ 04:00) (53 - 72)  BP: 173/69 (08-09-22 @ 04:00) (128/60 - 218/94)  BP(mean): 99 (08-09-22 @ 04:00) (86 - 135)  ABP: --  ABP(mean): --  Wt(kg): --  CVP(cm H2O): --  VBG - ( 08 Aug 2022 23:27 )  pH: 7.42  /  pCO2: 41    /  pO2: 47    / HCO3: 27    / Base Excess: 1.9   /  SaO2: 78.3   Lactate: 1.3                Exam: RRR, well perfused  Perfusion     [x]Adequate   [ ]Inadequate  Mentation   [x]Normal       [ ]Reduced  Extremities  [x]Warm         [ ]Cool  Volume Status [ ]Hypervolemic [x]Euvolemic [ ]Hypovolemic  Meds: amLODIPine   Tablet 10 milliGRAM(s) Oral daily  doxazosin 4 milliGRAM(s) Oral <User Schedule>  hydrALAZINE 25 milliGRAM(s) Oral every 8 hours      GI/NUTRITION  Exam: soft nonttp. RLQ cellulitis ttp and spreading erythema over demarcation  Diet: reg  Meds: pantoprazole    Tablet 40 milliGRAM(s) Oral before breakfast  polyethylene glycol 3350 17 Gram(s) Oral daily  senna 2 Tablet(s) Oral at bedtime      GENITOURINARY  I&O's Detail    08-07 @ 07:01  -  08-08 @ 07:00  --------------------------------------------------------  IN:    IV PiggyBack: 250 mL    IV PiggyBack: 54 mL    Nepro with Carb Steady: 140 mL    Oral Fluid: 270 mL  Total IN: 714 mL    OUT:    Voided (mL): 30 mL  Total OUT: 30 mL    Total NET: 684 mL      08-08 @ 07:01  -  08-09 @ 05:09  --------------------------------------------------------  IN:    Insulin: 20.5 mL    IV PiggyBack: 50 mL    Oral Fluid: 777 mL    Other (mL): 500 mL  Total IN: 1347.5 mL    OUT:    Indwelling Catheter - Urethral (mL): 20 mL    Other (mL): 2500 mL  Total OUT: 2520 mL    Total NET: -1172.5 mL          08-08    132<L>  |  94<L>  |  44<H>  ----------------------------<  147<H>  4.2   |  23  |  3.23<H>    Ca    8.5      08 Aug 2022 23:44  Phos  3.4     08-08  Mg     2.0     08-08    TPro  5.7<L>  /  Alb  2.1<L>  /  TBili  0.2  /  DBili  0.1  /  AST  18  /  ALT  15  /  AlkPhos  278<H>  08-08    Meds:     HEMATOLOGIC  Meds:                         8.7    19.54 )-----------( 361      ( 08 Aug 2022 05:42 )             26.8     PT/INR - ( 08 Aug 2022 05:42 )   PT: 13.5 sec;   INR: 1.16 ratio         PTT - ( 08 Aug 2022 05:42 )  PTT:30.7 sec    INFECTIOUS DISEASES  T(C): 36.8 (08-09-22 @ 03:00), Max: 37.6 (08-08-22 @ 23:00)  Wt(kg): --  WBC Count: 19.54 K/uL (08-08 @ 05:42)    Recent Cultures:  Specimen Source: .Tissue Other, 08-08 @ 17:44; Results --; Gram Stain:   No polymorphonuclear cells seen seen per low power field  No organisms seen per oil power field; Organism: --  Specimen Source: .Blood Blood, 08-03 @ 23:46; Results   No Growth Final; Gram Stain: --; Organism: --    Meds: epoetin cortney-epbx (RETACRIT) Injectable 64535 Unit(s) SubCutaneous every 7 days  mycophenolate mofetil 250 milliGRAM(s) Oral <User Schedule>  sirolimus 2.5 milliGRAM(s) Oral <User Schedule>  trimethoprim   80 mG/sulfamethoxazole 400 mG 1 Tablet(s) Oral daily  valGANciclovir 450 milliGRAM(s) Oral <User Schedule>      ENDOCRINE  Capillary Blood Glucose    Meds: finasteride 5 milliGRAM(s) Oral daily  insulin regular Infusion 3 Unit(s)/Hr IV Continuous <Continuous>  levothyroxine 50 MICROGram(s) Oral daily  predniSONE   Tablet 20 milliGRAM(s) Oral daily      ACCESS DEVICES:  [x] Peripheral IV  [ ] Central Venous Line		[ ] R	[ ] L	[ ] IJ	[ ] Fem	[ ] SC	Placed:   [ ] Arterial Line			[ ] R	[ ] L	[ ] Fem	[ ] Rad	[ ] Ax	Placed:   [ ] PICC:					[ ] Mediport  [ ] Urinary Catheter, Date Placed:   [x] Necessity of urinary, arterial, and venous catheters discussed    OTHER MEDICATIONS:  chlorhexidine 2% Cloths 1 Application(s) Topical <User Schedule>

## 2022-08-09 NOTE — PROGRESS NOTE ADULT - ATTENDING COMMENTS
Mental status stable  Kidney not functioning due to rejection.   Check duplex to r/o DVT  f/u punch biopsy of skin  Cont vanco  stop fluconazole  Dialysis with UF only for volume removal.

## 2022-08-09 NOTE — PROGRESS NOTE ADULT - SUBJECTIVE AND OBJECTIVE BOX
Follow Up:      Interval History/ROS:Patient is a 66y old  Male who presents with a chief complaint of Status Epilepticus (09 Aug 2022 05:08)      REVIEW OF SYSTEMS  [  ] ROS unobtainable because:    [ x ] All other systems negative except as noted below    Constitutional:  [ ] fever [ ] chills  [ ] weight loss  [ ]night sweat  [ ]poor appetite/PO intake [ ]fatigue   Skin:  [ ] rash [ ] phlebitis	  Eyes: [ ] icterus [ ] pain  [ ] discharge	  ENMT: [ ] sore throat  [ ] thrush [ ] ulcers [ ] exudates [ ]anosmia  Respiratory: [ ] dyspnea [ ] hemoptysis [ ] cough [ ] sputum	  Cardiovascular:  [ ] chest pain [ ] palpitations [ ] edema	  Gastrointestinal:  [ ] nausea [ ] vomiting [ ] diarrhea [ ] constipation [ ] pain	  Genitourinary:  [ ] dysuria [ ] frequency [ ] hematuria [ ] discharge [ ] flank pain  [ ] incontinence  Musculoskeletal:  [ ] myalgias [ ] arthralgias [ ] arthritis  [ ] back pain  Neurological:  [ ] headache [ ] weakness [ ] seizures  [ ] confusion/altered mental status    Allergies  No Known Allergies    ANTIMICROBIALS:    trimethoprim   80 mG/sulfamethoxazole 400 mG 1 daily  valGANciclovir 450 <User Schedule>    OTHER MEDS: MEDICATIONS  (STANDING):  acetaminophen     Tablet .. 650 every 6 hours PRN  amLODIPine   Tablet 10 daily  doxazosin 4 <User Schedule>  epoetin cortney-epbx (RETACRIT) Injectable 76584 every 7 days  finasteride 5 daily  fosphenytoin IVPB 200 every 12 hours  heparin   Injectable 5000 every 12 hours  hydrALAZINE 25 every 8 hours  insulin glargine Injectable (LANTUS) 12 at bedtime  insulin NPH human recombinant 8 once  insulin regular Infusion 3 <Continuous>  levothyroxine 50 daily  melatonin 6 at bedtime  mycophenolate mofetil 250 <User Schedule>  pantoprazole    Tablet 40 before breakfast  polyethylene glycol 3350 17 daily  predniSONE   Tablet 20 daily  senna 2 at bedtime  sirolimus 2.5 <User Schedule>      Vital Signs Last 24 Hrs  T(F): 98.8 (08-09-22 @ 07:00), Max: 100.9 (08-08-22 @ 02:50)    Vital Signs Last 24 Hrs  HR: 64 (08-09-22 @ 10:00) (53 - 72)  BP: 138/63 (08-09-22 @ 10:00) (138/63 - 218/94)  RR: 19 (08-09-22 @ 10:00)  SpO2: 96% (08-09-22 @ 10:00) (96% - 100%)  Wt(kg): --    EXAM:    Constitutional:  well preserved, comfortable  Head/Eyes: no icterus, PERRL, EOMI  ENT:  supple; no thrush  LUNGS:  CTA  CVS:  normal S1, S2, no murmur  Abd:  soft, non-tender; non-distended  Ext:  no edema  Vascular:  IV site no erythema tenderness or discharge  MSK:  joints without swelling  Neuro: AAO X 3, non- focal      Labs:                        8.7    22.22 )-----------( 321      ( 09 Aug 2022 05:52 )             26.9     08-09    131<L>  |  94<L>  |  51<H>  ----------------------------<  130<H>  4.0   |  23  |  3.43<H>    Ca    8.3<L>      09 Aug 2022 05:54  Phos  3.6     08-09  Mg     2.0     08-09    TPro  5.6<L>  /  Alb  1.9<L>  /  TBili  0.2  /  DBili  0.2  /  AST  16  /  ALT  14  /  AlkPhos  261<H>  08-09      WBC Trend:  WBC Count: 22.22 (08-09-22 @ 05:52)  WBC Count: 19.54 (08-08-22 @ 05:42)  WBC Count: 19.38 (08-07-22 @ 06:05)  WBC Count: 22.92 (08-06-22 @ 05:37)      Creatine Trend:  Creatinine, Serum: 3.43 (08-09)  Creatinine, Serum: 3.23 (08-08)  Creatinine, Serum: 4.70 (08-08)  Creatinine, Serum: 4.58 (08-08)      Liver Biochemical Testing Trend:  Alanine Aminotransferase (ALT/SGPT): 14 (08-09)  Alanine Aminotransferase (ALT/SGPT): 15 (08-08)  Alanine Aminotransferase (ALT/SGPT): 19 (08-07)  Alanine Aminotransferase (ALT/SGPT): 21 (08-06)  Alanine Aminotransferase (ALT/SGPT): 28 (08-05)  Aspartate Aminotransferase (AST/SGOT): 16 (08-09-22 @ 05:54)  Aspartate Aminotransferase (AST/SGOT): 18 (08-08-22 @ 05:42)  Aspartate Aminotransferase (AST/SGOT): 20 (08-07-22 @ 06:05)  Aspartate Aminotransferase (AST/SGOT): 26 (08-06-22 @ 05:37)  Aspartate Aminotransferase (AST/SGOT): 29 (08-05-22 @ 04:17)  Bilirubin Direct, Serum: 0.2 (08-09)  Bilirubin Total, Serum: 0.2 (08-09)  Bilirubin Direct, Serum: 0.1 (08-08)  Bilirubin Total, Serum: 0.2 (08-08)  Bilirubin Direct, Serum: 0.1 (08-07)      Trend LDH  07-30-22 @ 13:02  785<H>  07-25-22 @ 06:30  655<H>  06-11-22 @ 18:47  223  05-02-22 @ 10:05  246<H>  04-30-22 @ 07:15  260<H>    MICROBIOLOGY:  Vancomycin Level, Random: 19.9 (08-09 @ 09:15)  Vancomycin Level, Random: 13.5 (08-08 @ 05:42)  Vancomycin Level, Random: 15.6 (08-07 @ 06:05)  Vancomycin Level, Random: 20.4 (08-06 @ 05:37)  Vancomycin Level, Random: 16.8 (08-05 @ 04:18)    MRSA PCR Result.: NotDetec (07-26-22 @ 03:47)  MRSA PCR Result.: NotDetec (07-13-22 @ 21:02)  MRSA PCR Result.: NotDetec (06-26-22 @ 11:25)      Culture - Tissue with Gram Stain (collected 08 Aug 2022 17:44)  Source: .Tissue Other    Culture - Fungal, Tissue (collected 08 Aug 2022 17:44)  Source: .Tissue Other  Preliminary Report:    Testing in progress    Culture - Blood (collected 03 Aug 2022 23:46)  Source: .Blood Blood  Final Report:    No Growth Final    Culture - Blood (collected 03 Aug 2022 23:46)  Source: .Blood Blood  Final Report:    No Growth Final    Culture - Blood (collected 30 Jul 2022 06:19)  Source: .Blood Blood-Peripheral  Final Report:    No Growth Final    Culture - Blood (collected 30 Jul 2022 06:07)  Source: .Blood Blood-Peripheral  Final Report:    No Growth Final    Culture - Blood (collected 27 Jul 2022 04:34)  Source: .Blood Blood-Peripheral  Final Report:    No Growth Final    Culture - Blood (collected 27 Jul 2022 04:34)  Source: .Blood Blood-Peripheral  Final Report:    No Growth Final    Culture - Fungal, Blood (collected 27 Jul 2022 04:34)  Source: .Blood Blood  Preliminary Report:    No growth    Culture - Urine (collected 25 Jul 2022 18:27)  Source: Catheterized Catheterized  Final Report:    50,000 - 99,000 CFU/mL Klebsiella pneumoniae ESBL  Organism: Klebsiella pneumoniae ESBL  Organism: Klebsiella pneumoniae ESBL    Sensitivities:      -  Amikacin: S <=16      -  Amoxicillin/Clavulanic Acid: S <=8/4      -  Ampicillin: R >16 These ampicillin results predict results for amoxicillin      -  Ampicillin/Sulbactam: I 16/8 Enterobacter, Klebsiella aerogenes, Citrobacter, and Serratia may develop resistance during prolonged therapy (3-4 days)      -  Aztreonam: R 16      -  Cefazolin: R >16 ([email or web address]_[email or web address]) For uncomplicated UTI with K. pneumoniae, E. coli, or P. mirablis: TALITA <=16 is sensitive and TALITA >=32 is resistant. This also predicts results for oral agents cefaclor, cefdinir, cefpodoxime, cefprozil, cefuroxime axetil, cephalexin and locarbef for uncomplicated UTI. Note that some isolates may be susceptible to these agents while testing resistant to cefazolin.      -  Cefepime: R 8      -  Ceftriaxone: R >32 Enterobacter, Klebsiella aerogenes, Citrobacter, and Serratia may develop resistance during prolonged therapy      -  Ciprofloxacin: R 1      -  Ertapenem: S <=0.5      -  Gentamicin: R >8      -  Imipenem: S <=1      -  Levofloxacin: I 1      -  Meropenem: S <=1      -  Nitrofurantoin: S <=32 Should not be used to treat pyelonephritis      -  Piperacillin/Tazobactam: S <=8      -  Tigecycline: S <=2      -  Tobramycin: I 8      -  Trimethoprim/Sulfamethoxazole: R >2/38      Method Type: TALITA      HIV-1/2 Combo Result: Nonreact (04-21-22 @ 14:47)  HIV-1/2 Combo Result: Nonreact (08-09-20 @ 19:26)    COVID-19 PCR: NotDetec (08-07-22 @ 18:05)  COVID-19 PCR: NotDetec (07-25-22 @ 11:51)  COVID-19 PCR: NotDetec (07-23-22 @ 07:55)  COVID-19 PCR: NotDetec (07-17-22 @ 07:54)  COVID-19 PCR: NotDetec (07-10-22 @ 16:11)  COVID-19 PCR: NotDetec (07-05-22 @ 06:03)  COVID-19 PCR: NotDetec (06-23-22 @ 07:01)  COVID-19 PCR: NotDetec (06-14-22 @ 12:44)      Procalcitonin, Serum: 0.95 (08-09)  Procalcitonin, Serum: 0.96 (08-08)  Procalcitonin, Serum: 1.11 (08-07)  Procalcitonin, Serum: 1.18 (08-06)  Procalcitonin, Serum: 1.31 (08-05)  Procalcitonin, Serum: 0.78 (08-03)  Procalcitonin, Serum: 0.85 (08-02)    C-Reactive Protein, Serum: 302 (08-08)    Blood Gas Venous - Lactate: 1.3 (08-08 @ 23:27)    RADIOLOGY:  imaging below personally reviewed   Follow Up:  Patient has no acute complaints, awake and alert, but mildly sleepy. Denies any fever or chills. Admits to abdominal pain RLQ with rash. Denies further seizure episodes.     Interval History/ROS:Patient is a 66y old  Male who presents with a chief complaint of Status Epilepticus (09 Aug 2022 05:08)      REVIEW OF SYSTEMS  [ x ] All other systems negative except as noted below    Constitutional:  [ ] fever [ ] chills  [ ] weight loss  [ ]night sweat  [ ]poor appetite/PO intake [ ]fatigue   Skin:  [x] rash [ ] phlebitis	  Eyes: [ ] icterus [ ] pain  [ ] discharge	  ENMT: [ ] sore throat  [ ] thrush [ ] ulcers [ ] exudates [ ]anosmia  Respiratory: [ ] dyspnea [ ] hemoptysis [ ] cough [ ] sputum	  Cardiovascular:  [ ] chest pain [ ] palpitations [ ] edema	  Gastrointestinal:  [ ] nausea [ ] vomiting [ ] diarrhea [ ] constipation [x] pain	  Genitourinary:  [ ] dysuria [ ] frequency [ ] hematuria [ ] discharge [ ] flank pain  [ ] incontinence  Musculoskeletal:  [ ] myalgias [ ] arthralgias [ ] arthritis  [ ] back pain  Neurological:  [ ] headache [ ] weakness [ ] seizures  [ ] confusion/altered mental status    Allergies  No Known Allergies    ANTIMICROBIALS:    trimethoprim   80 mG/sulfamethoxazole 400 mG 1 daily  valGANciclovir 450 <User Schedule>    OTHER MEDS: MEDICATIONS  (STANDING):  acetaminophen     Tablet .. 650 every 6 hours PRN  amLODIPine   Tablet 10 daily  doxazosin 4 <User Schedule>  epoetin cortney-epbx (RETACRIT) Injectable 44342 every 7 days  finasteride 5 daily  fosphenytoin IVPB 200 every 12 hours  heparin   Injectable 5000 every 12 hours  hydrALAZINE 25 every 8 hours  insulin glargine Injectable (LANTUS) 12 at bedtime  insulin NPH human recombinant 8 once  insulin regular Infusion 3 <Continuous>  levothyroxine 50 daily  melatonin 6 at bedtime  mycophenolate mofetil 250 <User Schedule>  pantoprazole    Tablet 40 before breakfast  polyethylene glycol 3350 17 daily  predniSONE   Tablet 20 daily  senna 2 at bedtime  sirolimus 2.5 <User Schedule>      Vital Signs Last 24 Hrs  T(F): 98.8 (08-09-22 @ 07:00), Max: 100.9 (08-08-22 @ 02:50)    Vital Signs Last 24 Hrs  HR: 64 (08-09-22 @ 10:00) (53 - 72)  BP: 138/63 (08-09-22 @ 10:00) (138/63 - 218/94)  RR: 19 (08-09-22 @ 10:00)  SpO2: 96% (08-09-22 @ 10:00) (96% - 100%)  Wt(kg): --    EXAM:    Constitutional:  well preserved, comfortable  Head/Eyes: no icterus, PERRL, EOMI  ENT:  supple; no thrush  LUNGS:  CTA  CVS:  normal S1, S2, no murmur  Abd:  soft, non-tender; non-distended  Ext:  no edema  Vascular:  IV site no erythema tenderness or discharge  MSK:  joints without swelling  Neuro: AAO X 3, non- focal      Labs:                        8.7    22.22 )-----------( 321      ( 09 Aug 2022 05:52 )             26.9     08-09    131<L>  |  94<L>  |  51<H>  ----------------------------<  130<H>  4.0   |  23  |  3.43<H>    Ca    8.3<L>      09 Aug 2022 05:54  Phos  3.6     08-09  Mg     2.0     08-09    TPro  5.6<L>  /  Alb  1.9<L>  /  TBili  0.2  /  DBili  0.2  /  AST  16  /  ALT  14  /  AlkPhos  261<H>  08-09      WBC Trend:  WBC Count: 22.22 (08-09-22 @ 05:52)  WBC Count: 19.54 (08-08-22 @ 05:42)  WBC Count: 19.38 (08-07-22 @ 06:05)  WBC Count: 22.92 (08-06-22 @ 05:37)      Creatine Trend:  Creatinine, Serum: 3.43 (08-09)  Creatinine, Serum: 3.23 (08-08)  Creatinine, Serum: 4.70 (08-08)  Creatinine, Serum: 4.58 (08-08)      Liver Biochemical Testing Trend:  Alanine Aminotransferase (ALT/SGPT): 14 (08-09)  Alanine Aminotransferase (ALT/SGPT): 15 (08-08)  Alanine Aminotransferase (ALT/SGPT): 19 (08-07)  Alanine Aminotransferase (ALT/SGPT): 21 (08-06)  Alanine Aminotransferase (ALT/SGPT): 28 (08-05)  Aspartate Aminotransferase (AST/SGOT): 16 (08-09-22 @ 05:54)  Aspartate Aminotransferase (AST/SGOT): 18 (08-08-22 @ 05:42)  Aspartate Aminotransferase (AST/SGOT): 20 (08-07-22 @ 06:05)  Aspartate Aminotransferase (AST/SGOT): 26 (08-06-22 @ 05:37)  Aspartate Aminotransferase (AST/SGOT): 29 (08-05-22 @ 04:17)  Bilirubin Direct, Serum: 0.2 (08-09)  Bilirubin Total, Serum: 0.2 (08-09)  Bilirubin Direct, Serum: 0.1 (08-08)  Bilirubin Total, Serum: 0.2 (08-08)  Bilirubin Direct, Serum: 0.1 (08-07)      Trend LDH  07-30-22 @ 13:02  785<H>  07-25-22 @ 06:30  655<H>  06-11-22 @ 18:47  223  05-02-22 @ 10:05  246<H>  04-30-22 @ 07:15  260<H>    MICROBIOLOGY:  Vancomycin Level, Random: 19.9 (08-09 @ 09:15)  Vancomycin Level, Random: 13.5 (08-08 @ 05:42)  Vancomycin Level, Random: 15.6 (08-07 @ 06:05)  Vancomycin Level, Random: 20.4 (08-06 @ 05:37)  Vancomycin Level, Random: 16.8 (08-05 @ 04:18)    MRSA PCR Result.: NotDetec (07-26-22 @ 03:47)  MRSA PCR Result.: NotDetec (07-13-22 @ 21:02)  MRSA PCR Result.: NotDetec (06-26-22 @ 11:25)      Culture - Tissue with Gram Stain (collected 08 Aug 2022 17:44)  Source: .Tissue Other    Culture - Fungal, Tissue (collected 08 Aug 2022 17:44)  Source: .Tissue Other  Preliminary Report:    Testing in progress    Culture - Blood (collected 03 Aug 2022 23:46)  Source: .Blood Blood  Final Report:    No Growth Final    Culture - Blood (collected 03 Aug 2022 23:46)  Source: .Blood Blood  Final Report:    No Growth Final    Culture - Blood (collected 30 Jul 2022 06:19)  Source: .Blood Blood-Peripheral  Final Report:    No Growth Final    Culture - Blood (collected 30 Jul 2022 06:07)  Source: .Blood Blood-Peripheral  Final Report:    No Growth Final    Culture - Blood (collected 27 Jul 2022 04:34)  Source: .Blood Blood-Peripheral  Final Report:    No Growth Final    Culture - Blood (collected 27 Jul 2022 04:34)  Source: .Blood Blood-Peripheral  Final Report:    No Growth Final    Culture - Fungal, Blood (collected 27 Jul 2022 04:34)  Source: .Blood Blood  Preliminary Report:    No growth    Culture - Urine (collected 25 Jul 2022 18:27)  Source: Catheterized Catheterized  Final Report:    50,000 - 99,000 CFU/mL Klebsiella pneumoniae ESBL  Organism: Klebsiella pneumoniae ESBL  Organism: Klebsiella pneumoniae ESBL    Sensitivities:      -  Amikacin: S <=16      -  Amoxicillin/Clavulanic Acid: S <=8/4      -  Ampicillin: R >16 These ampicillin results predict results for amoxicillin      -  Ampicillin/Sulbactam: I 16/8 Enterobacter, Klebsiella aerogenes, Citrobacter, and Serratia may develop resistance during prolonged therapy (3-4 days)      -  Aztreonam: R 16      -  Cefazolin: R >16 (MIC_CL_COM_[email or web address]) For uncomplicated UTI with K. pneumoniae, E. coli, or P. mirablis: TALITA <=16 is sensitive and TALITA >=32 is resistant. This also predicts results for oral agents cefaclor, cefdinir, cefpodoxime, cefprozil, cefuroxime axetil, cephalexin and locarbef for uncomplicated UTI. Note that some isolates may be susceptible to these agents while testing resistant to cefazolin.      -  Cefepime: R 8      -  Ceftriaxone: R >32 Enterobacter, Klebsiella aerogenes, Citrobacter, and Serratia may develop resistance during prolonged therapy      -  Ciprofloxacin: R 1      -  Ertapenem: S <=0.5      -  Gentamicin: R >8      -  Imipenem: S <=1      -  Levofloxacin: I 1      -  Meropenem: S <=1      -  Nitrofurantoin: S <=32 Should not be used to treat pyelonephritis      -  Piperacillin/Tazobactam: S <=8      -  Tigecycline: S <=2      -  Tobramycin: I 8      -  Trimethoprim/Sulfamethoxazole: R >2/38      Method Type: TALITA      HIV-1/2 Combo Result: Nonreact (04-21-22 @ 14:47)  HIV-1/2 Combo Result: Nonreact (08-09-20 @ 19:26)    COVID-19 PCR: NotDetec (08-07-22 @ 18:05)  COVID-19 PCR: NotDetec (07-25-22 @ 11:51)  COVID-19 PCR: NotDetec (07-23-22 @ 07:55)  COVID-19 PCR: NotDetec (07-17-22 @ 07:54)  COVID-19 PCR: NotDetec (07-10-22 @ 16:11)  COVID-19 PCR: NotDetec (07-05-22 @ 06:03)  COVID-19 PCR: NotDetec (06-23-22 @ 07:01)  COVID-19 PCR: NotDetec (06-14-22 @ 12:44)      Procalcitonin, Serum: 0.95 (08-09)  Procalcitonin, Serum: 0.96 (08-08)  Procalcitonin, Serum: 1.11 (08-07)  Procalcitonin, Serum: 1.18 (08-06)  Procalcitonin, Serum: 1.31 (08-05)  Procalcitonin, Serum: 0.78 (08-03)  Procalcitonin, Serum: 0.85 (08-02)    C-Reactive Protein, Serum: 302 (08-08)    Blood Gas Venous - Lactate: 1.3 (08-08 @ 23:27)    RADIOLOGY:  imaging below personally reviewed   Follow Up:  Patient has no acute complaints, awake and alert, but mildly sleepy. Denies any fever or chills. Admits to abdominal pain RLQ with rash. Denies further seizure episodes.     Interval History/ROS:Patient is a 66y old  Male who presents with a chief complaint of Status Epilepticus (09 Aug 2022 05:08)      REVIEW OF SYSTEMS  [ x ] All other systems negative except as noted below    Constitutional:  [ ] fever [ ] chills  [ ] weight loss  [ ]night sweat  [ ]poor appetite/PO intake [ ]fatigue   Skin:  [x] rash [ ] phlebitis	  Eyes: [ ] icterus [ ] pain  [ ] discharge	  ENMT: [ ] sore throat  [ ] thrush [ ] ulcers [ ] exudates [ ]anosmia  Respiratory: [ ] dyspnea [ ] hemoptysis [ ] cough [ ] sputum	  Cardiovascular:  [ ] chest pain [ ] palpitations [ ] edema	  Gastrointestinal:  [ ] nausea [ ] vomiting [ ] diarrhea [ ] constipation [x] pain	  Genitourinary:  [ ] dysuria [ ] frequency [ ] hematuria [ ] discharge [ ] flank pain  [ ] incontinence  Musculoskeletal:  [ ] myalgias [ ] arthralgias [ ] arthritis  [ ] back pain  Neurological:  [ ] headache [ ] weakness [ ] seizures  [ ] confusion/altered mental status    Allergies  No Known Allergies    ANTIMICROBIALS:    trimethoprim   80 mG/sulfamethoxazole 400 mG 1 daily  valGANciclovir 450 <User Schedule>    OTHER MEDS: MEDICATIONS  (STANDING):  acetaminophen     Tablet .. 650 every 6 hours PRN  amLODIPine   Tablet 10 daily  doxazosin 4 <User Schedule>  epoetin crotney-epbx (RETACRIT) Injectable 80222 every 7 days  finasteride 5 daily  fosphenytoin IVPB 200 every 12 hours  heparin   Injectable 5000 every 12 hours  hydrALAZINE 25 every 8 hours  insulin glargine Injectable (LANTUS) 12 at bedtime  insulin NPH human recombinant 8 once  insulin regular Infusion 3 <Continuous>  levothyroxine 50 daily  melatonin 6 at bedtime  mycophenolate mofetil 250 <User Schedule>  pantoprazole    Tablet 40 before breakfast  polyethylene glycol 3350 17 daily  predniSONE   Tablet 20 daily  senna 2 at bedtime  sirolimus 2.5 <User Schedule>      Vital Signs Last 24 Hrs  T(F): 98.8 (08-09-22 @ 07:00), Max: 100.9 (08-08-22 @ 02:50)    Vital Signs Last 24 Hrs  HR: 64 (08-09-22 @ 10:00) (53 - 72)  BP: 138/63 (08-09-22 @ 10:00) (138/63 - 218/94)  RR: 19 (08-09-22 @ 10:00)  SpO2: 96% (08-09-22 @ 10:00) (96% - 100%)  Wt(kg): --    EXAM:    Constitutional:  well preserved, comfortable  Head/Eyes: no icterus, PERRL, EOMI  ENT:  supple; no thrush  LUNGS:  CTA +L chest wall HD catheter c/d/i  CVS:  normal S1, S2, no murmur  Abd:  soft, non-tender; non-distended +RLQ skin erythema, induration, TTP, swelling extending to R groin  Ext:  no edema  Vascular:  IV site no erythema tenderness or discharge  MSK:  joints without swelling  Neuro: AAO X 3, non- focal      Labs:                        8.7    22.22 )-----------( 321      ( 09 Aug 2022 05:52 )             26.9     08-09    131<L>  |  94<L>  |  51<H>  ----------------------------<  130<H>  4.0   |  23  |  3.43<H>    Ca    8.3<L>      09 Aug 2022 05:54  Phos  3.6     08-09  Mg     2.0     08-09    TPro  5.6<L>  /  Alb  1.9<L>  /  TBili  0.2  /  DBili  0.2  /  AST  16  /  ALT  14  /  AlkPhos  261<H>  08-09      WBC Trend:  WBC Count: 22.22 (08-09-22 @ 05:52)  WBC Count: 19.54 (08-08-22 @ 05:42)  WBC Count: 19.38 (08-07-22 @ 06:05)  WBC Count: 22.92 (08-06-22 @ 05:37)      Creatine Trend:  Creatinine, Serum: 3.43 (08-09)  Creatinine, Serum: 3.23 (08-08)  Creatinine, Serum: 4.70 (08-08)  Creatinine, Serum: 4.58 (08-08)      Liver Biochemical Testing Trend:  Alanine Aminotransferase (ALT/SGPT): 14 (08-09)  Alanine Aminotransferase (ALT/SGPT): 15 (08-08)  Alanine Aminotransferase (ALT/SGPT): 19 (08-07)  Alanine Aminotransferase (ALT/SGPT): 21 (08-06)  Alanine Aminotransferase (ALT/SGPT): 28 (08-05)  Aspartate Aminotransferase (AST/SGOT): 16 (08-09-22 @ 05:54)  Aspartate Aminotransferase (AST/SGOT): 18 (08-08-22 @ 05:42)  Aspartate Aminotransferase (AST/SGOT): 20 (08-07-22 @ 06:05)  Aspartate Aminotransferase (AST/SGOT): 26 (08-06-22 @ 05:37)  Aspartate Aminotransferase (AST/SGOT): 29 (08-05-22 @ 04:17)  Bilirubin Direct, Serum: 0.2 (08-09)  Bilirubin Total, Serum: 0.2 (08-09)  Bilirubin Direct, Serum: 0.1 (08-08)  Bilirubin Total, Serum: 0.2 (08-08)  Bilirubin Direct, Serum: 0.1 (08-07)      Trend LDH  07-30-22 @ 13:02  785<H>  07-25-22 @ 06:30  655<H>  06-11-22 @ 18:47  223  05-02-22 @ 10:05  246<H>  04-30-22 @ 07:15  260<H>    MICROBIOLOGY:  Vancomycin Level, Random: 19.9 (08-09 @ 09:15)  Vancomycin Level, Random: 13.5 (08-08 @ 05:42)  Vancomycin Level, Random: 15.6 (08-07 @ 06:05)  Vancomycin Level, Random: 20.4 (08-06 @ 05:37)  Vancomycin Level, Random: 16.8 (08-05 @ 04:18)    MRSA PCR Result.: NotDetec (07-26-22 @ 03:47)  MRSA PCR Result.: NotDetec (07-13-22 @ 21:02)  MRSA PCR Result.: NotDetec (06-26-22 @ 11:25)      Culture - Tissue with Gram Stain (collected 08 Aug 2022 17:44)  Source: .Tissue Other    Culture - Fungal, Tissue (collected 08 Aug 2022 17:44)  Source: .Tissue Other  Preliminary Report:    Testing in progress    Culture - Blood (collected 03 Aug 2022 23:46)  Source: .Blood Blood  Final Report:    No Growth Final    Culture - Blood (collected 03 Aug 2022 23:46)  Source: .Blood Blood  Final Report:    No Growth Final    Culture - Blood (collected 30 Jul 2022 06:19)  Source: .Blood Blood-Peripheral  Final Report:    No Growth Final    Culture - Blood (collected 30 Jul 2022 06:07)  Source: .Blood Blood-Peripheral  Final Report:    No Growth Final    Culture - Blood (collected 27 Jul 2022 04:34)  Source: .Blood Blood-Peripheral  Final Report:    No Growth Final    Culture - Blood (collected 27 Jul 2022 04:34)  Source: .Blood Blood-Peripheral  Final Report:    No Growth Final    Culture - Fungal, Blood (collected 27 Jul 2022 04:34)  Source: .Blood Blood  Preliminary Report:    No growth    Culture - Urine (collected 25 Jul 2022 18:27)  Source: Catheterized Catheterized  Final Report:    50,000 - 99,000 CFU/mL Klebsiella pneumoniae ESBL  Organism: Klebsiella pneumoniae ESBL  Organism: Klebsiella pneumoniae ESBL    Sensitivities:      -  Amikacin: S <=16      -  Amoxicillin/Clavulanic Acid: S <=8/4      -  Ampicillin: R >16 These ampicillin results predict results for amoxicillin      -  Ampicillin/Sulbactam: I 16/8 Enterobacter, Klebsiella aerogenes, Citrobacter, and Serratia may develop resistance during prolonged therapy (3-4 days)      -  Aztreonam: R 16      -  Cefazolin: R >16 (MIC_CL_COM_[email or web address]) For uncomplicated UTI with K. pneumoniae, E. coli, or P. mirablis: TALITA <=16 is sensitive and TALITA >=32 is resistant. This also predicts results for oral agents cefaclor, cefdinir, cefpodoxime, cefprozil, cefuroxime axetil, cephalexin and locarbef for uncomplicated UTI. Note that some isolates may be susceptible to these agents while testing resistant to cefazolin.      -  Cefepime: R 8      -  Ceftriaxone: R >32 Enterobacter, Klebsiella aerogenes, Citrobacter, and Serratia may develop resistance during prolonged therapy      -  Ciprofloxacin: R 1      -  Ertapenem: S <=0.5      -  Gentamicin: R >8      -  Imipenem: S <=1      -  Levofloxacin: I 1      -  Meropenem: S <=1      -  Nitrofurantoin: S <=32 Should not be used to treat pyelonephritis      -  Piperacillin/Tazobactam: S <=8      -  Tigecycline: S <=2      -  Tobramycin: I 8      -  Trimethoprim/Sulfamethoxazole: R >2/38      Method Type: TALITA      HIV-1/2 Combo Result: Nonreact (04-21-22 @ 14:47)  HIV-1/2 Combo Result: Nonreact (08-09-20 @ 19:26)    COVID-19 PCR: NotDetec (08-07-22 @ 18:05)  COVID-19 PCR: NotDetec (07-25-22 @ 11:51)  COVID-19 PCR: NotDetec (07-23-22 @ 07:55)  COVID-19 PCR: NotDetec (07-17-22 @ 07:54)  COVID-19 PCR: NotDetec (07-10-22 @ 16:11)  COVID-19 PCR: NotDetec (07-05-22 @ 06:03)  COVID-19 PCR: NotDetec (06-23-22 @ 07:01)  COVID-19 PCR: NotDetec (06-14-22 @ 12:44)      Procalcitonin, Serum: 0.95 (08-09)  Procalcitonin, Serum: 0.96 (08-08)  Procalcitonin, Serum: 1.11 (08-07)  Procalcitonin, Serum: 1.18 (08-06)  Procalcitonin, Serum: 1.31 (08-05)  Procalcitonin, Serum: 0.78 (08-03)  Procalcitonin, Serum: 0.85 (08-02)    C-Reactive Protein, Serum: 302 (08-08)    Blood Gas Venous - Lactate: 1.3 (08-08 @ 23:27)    RADIOLOGY:  imaging below personally reviewed   Follow Up:  Patient has no acute complaints, awake and alert, but mildly sleepy. Denies any fever or chills. Admits to abdominal pain RLQ with rash. Denies further seizure episodes.     Interval History/ROS:Patient is a 66y old  Male who presents with a chief complaint of Status Epilepticus (09 Aug 2022 05:08)      REVIEW OF SYSTEMS  [ x ] All other systems negative except as noted below    Constitutional:  [ ] fever [ ] chills  [ ] weight loss  [ ]night sweat  [ ]poor appetite/PO intake [ ]fatigue   Skin:  [x] rash [ ] phlebitis	  Eyes: [ ] icterus [ ] pain  [ ] discharge	  ENMT: [ ] sore throat  [ ] thrush [ ] ulcers [ ] exudates [ ]anosmia  Respiratory: [ ] dyspnea [ ] hemoptysis [ ] cough [ ] sputum	  Cardiovascular:  [ ] chest pain [ ] palpitations [ ] edema	  Gastrointestinal:  [ ] nausea [ ] vomiting [ ] diarrhea [ ] constipation [x] pain	  Genitourinary:  [ ] dysuria [ ] frequency [ ] hematuria [ ] discharge [ ] flank pain  [ ] incontinence  Musculoskeletal:  [ ] myalgias [ ] arthralgias [ ] arthritis  [ ] back pain  Neurological:  [ ] headache [ ] weakness [ ] seizures  [ ] confusion/altered mental status    Allergies  No Known Allergies    ANTIMICROBIALS:    trimethoprim   80 mG/sulfamethoxazole 400 mG 1 daily  valGANciclovir 450 <User Schedule>    OTHER MEDS: MEDICATIONS  (STANDING):  acetaminophen     Tablet .. 650 every 6 hours PRN  amLODIPine   Tablet 10 daily  doxazosin 4 <User Schedule>  epoetin cortney-epbx (RETACRIT) Injectable 64468 every 7 days  finasteride 5 daily  fosphenytoin IVPB 200 every 12 hours  heparin   Injectable 5000 every 12 hours  hydrALAZINE 25 every 8 hours  insulin glargine Injectable (LANTUS) 12 at bedtime  insulin NPH human recombinant 8 once  insulin regular Infusion 3 <Continuous>  levothyroxine 50 daily  melatonin 6 at bedtime  mycophenolate mofetil 250 <User Schedule>  pantoprazole    Tablet 40 before breakfast  polyethylene glycol 3350 17 daily  predniSONE   Tablet 20 daily  senna 2 at bedtime  sirolimus 2.5 <User Schedule>      Vital Signs Last 24 Hrs  T(F): 98.8 (08-09-22 @ 07:00), Max: 100.9 (08-08-22 @ 02:50)    Vital Signs Last 24 Hrs  HR: 64 (08-09-22 @ 10:00) (53 - 72)  BP: 138/63 (08-09-22 @ 10:00) (138/63 - 218/94)  RR: 19 (08-09-22 @ 10:00)  SpO2: 96% (08-09-22 @ 10:00) (96% - 100%)  Wt(kg): --    EXAM:    Constitutional:  well preserved, comfortable  Head/Eyes: no icterus, PERRL, EOMI  ENT:  supple; no thrush  LUNGS:  CTA +L chest wall HD catheter c/d/i  CVS:  normal S1, S2, no murmur  Abd:  soft, non-tender; non-distended +RLQ skin erythema, induration, TTP, swelling extending to R groin  Ext:  no edema  Vascular:  IV site no erythema tenderness or discharge  MSK:  joints without swelling  Neuro: AAO X 3, non- focal      Labs:                        8.7    22.22 )-----------( 321      ( 09 Aug 2022 05:52 )             26.9     08-09    131<L>  |  94<L>  |  51<H>  ----------------------------<  130<H>  4.0   |  23  |  3.43<H>    Ca    8.3<L>      09 Aug 2022 05:54  Phos  3.6     08-09  Mg     2.0     08-09    TPro  5.6<L>  /  Alb  1.9<L>  /  TBili  0.2  /  DBili  0.2  /  AST  16  /  ALT  14  /  AlkPhos  261<H>  08-09      WBC Trend:  WBC Count: 22.22 (08-09-22 @ 05:52)  WBC Count: 19.54 (08-08-22 @ 05:42)  WBC Count: 19.38 (08-07-22 @ 06:05)  WBC Count: 22.92 (08-06-22 @ 05:37)      Creatine Trend:  Creatinine, Serum: 3.43 (08-09)  Creatinine, Serum: 3.23 (08-08)  Creatinine, Serum: 4.70 (08-08)  Creatinine, Serum: 4.58 (08-08)      Liver Biochemical Testing Trend:  Alanine Aminotransferase (ALT/SGPT): 14 (08-09)  Alanine Aminotransferase (ALT/SGPT): 15 (08-08)  Alanine Aminotransferase (ALT/SGPT): 19 (08-07)  Alanine Aminotransferase (ALT/SGPT): 21 (08-06)  Alanine Aminotransferase (ALT/SGPT): 28 (08-05)  Aspartate Aminotransferase (AST/SGOT): 16 (08-09-22 @ 05:54)  Aspartate Aminotransferase (AST/SGOT): 18 (08-08-22 @ 05:42)  Aspartate Aminotransferase (AST/SGOT): 20 (08-07-22 @ 06:05)  Aspartate Aminotransferase (AST/SGOT): 26 (08-06-22 @ 05:37)  Aspartate Aminotransferase (AST/SGOT): 29 (08-05-22 @ 04:17)  Bilirubin Direct, Serum: 0.2 (08-09)  Bilirubin Total, Serum: 0.2 (08-09)  Bilirubin Direct, Serum: 0.1 (08-08)  Bilirubin Total, Serum: 0.2 (08-08)  Bilirubin Direct, Serum: 0.1 (08-07)      Trend LDH  07-30-22 @ 13:02  785<H>  07-25-22 @ 06:30  655<H>  06-11-22 @ 18:47  223  05-02-22 @ 10:05  246<H>  04-30-22 @ 07:15  260<H>    MICROBIOLOGY:  Vancomycin Level, Random: 19.9 (08-09 @ 09:15)  Vancomycin Level, Random: 13.5 (08-08 @ 05:42)  Vancomycin Level, Random: 15.6 (08-07 @ 06:05)  Vancomycin Level, Random: 20.4 (08-06 @ 05:37)  Vancomycin Level, Random: 16.8 (08-05 @ 04:18)    MRSA PCR Result.: NotDetec (07-26-22 @ 03:47)  MRSA PCR Result.: NotDetec (07-13-22 @ 21:02)  MRSA PCR Result.: NotDetec (06-26-22 @ 11:25)      Culture - Tissue with Gram Stain (collected 08 Aug 2022 17:44)  Source: .Tissue Other    Culture - Fungal, Tissue (collected 08 Aug 2022 17:44)  Source: .Tissue Other  Preliminary Report:    Testing in progress    Culture - Blood (collected 03 Aug 2022 23:46)  Source: .Blood Blood  Final Report:    No Growth Final    Culture - Blood (collected 03 Aug 2022 23:46)  Source: .Blood Blood  Final Report:    No Growth Final    Culture - Blood (collected 30 Jul 2022 06:19)  Source: .Blood Blood-Peripheral  Final Report:    No Growth Final    Culture - Blood (collected 30 Jul 2022 06:07)  Source: .Blood Blood-Peripheral  Final Report:    No Growth Final    Culture - Blood (collected 27 Jul 2022 04:34)  Source: .Blood Blood-Peripheral  Final Report:    No Growth Final    Culture - Blood (collected 27 Jul 2022 04:34)  Source: .Blood Blood-Peripheral  Final Report:    No Growth Final    Culture - Fungal, Blood (collected 27 Jul 2022 04:34)  Source: .Blood Blood  Preliminary Report:    No growth    Culture - Urine (collected 25 Jul 2022 18:27)  Source: Catheterized Catheterized  Final Report:    50,000 - 99,000 CFU/mL Klebsiella pneumoniae ESBL  Organism: Klebsiella pneumoniae ESBL  Organism: Klebsiella pneumoniae ESBL    Sensitivities:      -  Amikacin: S <=16      -  Amoxicillin/Clavulanic Acid: S <=8/4      -  Ampicillin: R >16 These ampicillin results predict results for amoxicillin      -  Ampicillin/Sulbactam: I 16/8 Enterobacter, Klebsiella aerogenes, Citrobacter, and Serratia may develop resistance during prolonged therapy (3-4 days)      -  Aztreonam: R 16      -  Cefazolin: R >16 (MIC_CL_[id number]) For uncomplicated UTI with K. pneumoniae, E. coli, or P. mirablis: TALITA <=16 is sensitive and TALITA >=32 is resistant. This also predicts results for oral agents cefaclor, cefdinir, cefpodoxime, cefprozil, cefuroxime axetil, cephalexin and locarbef for uncomplicated UTI. Note that some isolates may be susceptible to these agents while testing resistant to cefazolin.      -  Cefepime: R 8      -  Ceftriaxone: R >32 Enterobacter, Klebsiella aerogenes, Citrobacter, and Serratia may develop resistance during prolonged therapy      -  Ciprofloxacin: R 1      -  Ertapenem: S <=0.5      -  Gentamicin: R >8      -  Imipenem: S <=1      -  Levofloxacin: I 1      -  Meropenem: S <=1      -  Nitrofurantoin: S <=32 Should not be used to treat pyelonephritis      -  Piperacillin/Tazobactam: S <=8      -  Tigecycline: S <=2      -  Tobramycin: I 8      -  Trimethoprim/Sulfamethoxazole: R >2/38      Method Type: TALITA      HIV-1/2 Combo Result: Nonreact (04-21-22 @ 14:47)  HIV-1/2 Combo Result: Nonreact (08-09-20 @ 19:26)    COVID-19 PCR: NotDetec (08-07-22 @ 18:05)  COVID-19 PCR: NotDetec (07-25-22 @ 11:51)  COVID-19 PCR: NotDetec (07-23-22 @ 07:55)  COVID-19 PCR: NotDetec (07-17-22 @ 07:54)  COVID-19 PCR: NotDetec (07-10-22 @ 16:11)  COVID-19 PCR: NotDetec (07-05-22 @ 06:03)  COVID-19 PCR: NotDetec (06-23-22 @ 07:01)  COVID-19 PCR: NotDetec (06-14-22 @ 12:44)      Procalcitonin, Serum: 0.95 (08-09)  Procalcitonin, Serum: 0.96 (08-08)  Procalcitonin, Serum: 1.11 (08-07)  Procalcitonin, Serum: 1.18 (08-06)  Procalcitonin, Serum: 1.31 (08-05)  Procalcitonin, Serum: 0.78 (08-03)  Procalcitonin, Serum: 0.85 (08-02)    C-Reactive Protein, Serum: 302 (08-08)    Blood Gas Venous - Lactate: 1.3 (08-08 @ 23:27)    RADIOLOGY:    <The imaging below has been reviewed and visualized by me independently. Findings as detailed in report below>    < from: CT Abdomen and Pelvis No Cont (08.08.22 @ 21:35) >  IMPRESSION:  1.  Stable examination from 8/5/2022.  2.  Small right psoas hematoma.  3.  Diffuse anasarca, including ill-defined intermuscular fluid of the   right lower quadrant abdominal wall, though no evidence of an organized   fluid collection on this noncontrast exam.    < end of copied text >

## 2022-08-09 NOTE — PROGRESS NOTE ADULT - SUBJECTIVE AND OBJECTIVE BOX
[facility] [place] [facility]   MD NINA MASON MD, PA KRISTINE SOLTANPOUR, DO INJUNG KO, NP    TEL:  OFFICE: [phone]    From 5pm-7am Answering Service [phone]    -- RENAL FOLLOW UP NOTE ---Date of Service 08-09-22 @ 15:43    Patient is a 66y old  Male who presents with a chief complaint of Status Epilepticus (09 Aug 2022 14:45)      Patient seen and examined in ICU.     VITALS:  T(F): 97.6 (08-09-22 @ 15:00), Max: 99.7 (08-08-22 @ 23:00)  HR: 61 (08-09-22 @ 15:00)  BP: 156/70 (08-09-22 @ 15:00)  RR: 15 (08-09-22 @ 15:00)  SpO2: 97% (08-09-22 @ 15:00)  Wt(kg): --    08-08 @ 07:01  -  08-09 @ 07:00  --------------------------------------------------------  IN: 1522.5 mL / OUT: 2520 mL / NET: -997.5 mL    08-09 @ 07:01  -  08-09 @ 15:43  --------------------------------------------------------  IN: 1002.5 mL / OUT: 3000 mL / NET: -1997.5 mL          PHYSICAL EXAM:  Constitutional: NAD  Neck: No JVD  Respiratory: CTAB, no wheezes, rales or rhonchi  Cardiovascular: S1, S2, RRR  Gastrointestinal: BS+, soft, NT/ND  Extremities: + peripheral edema    Hospital Medications:   MEDICATIONS  (STANDING):  amLODIPine   Tablet 10 milliGRAM(s) Oral daily  chlorhexidine 2% Cloths 1 Application(s) Topical <User Schedule>  doxazosin 4 milliGRAM(s) Oral <User Schedule>  epoetin cortney-epbx (RETACRIT) Injectable 26619 Unit(s) SubCutaneous every 7 days  finasteride 5 milliGRAM(s) Oral daily  fluconAZOLE   Tablet 400 milliGRAM(s) Oral daily  fosphenytoin IVPB 200 milliGRAM(s) PE IV Intermittent every 12 hours  heparin   Injectable 5000 Unit(s) SubCutaneous every 12 hours  hydrALAZINE 25 milliGRAM(s) Oral every 8 hours  insulin glargine Injectable (LANTUS) 12 Unit(s) SubCutaneous at bedtime  insulin lispro (ADMELOG) corrective regimen sliding scale   SubCutaneous three times a day before meals  insulin lispro (ADMELOG) corrective regimen sliding scale   SubCutaneous at bedtime  levothyroxine 50 MICROGram(s) Oral daily  melatonin 6 milliGRAM(s) Oral at bedtime  mycophenolate mofetil 250 milliGRAM(s) Oral <User Schedule>  pantoprazole    Tablet 40 milliGRAM(s) Oral before breakfast  polyethylene glycol 3350 17 Gram(s) Oral daily  predniSONE   Tablet 20 milliGRAM(s) Oral daily  senna 2 Tablet(s) Oral at bedtime  sirolimus 2.5 milliGRAM(s) Oral <User Schedule>  trimethoprim   80 mG/sulfamethoxazole 400 mG 1 Tablet(s) Oral daily  valGANciclovir 450 milliGRAM(s) Oral <User Schedule>      LABS:  08-09    131<L>  |  94<L>  |  51<H>  ----------------------------<  130<H>  4.0   |  23  |  3.43<H>    Ca    8.3<L>      09 Aug 2022 05:54  Phos  3.6     08-09  Mg     2.0     08-09    TPro  5.6<L>  /  Alb  1.9<L>  /  TBili  0.2  /  DBili  0.2  /  AST  16  /  ALT  14  /  AlkPhos  261<H>  08-09    Creatinine Trend: 3.43 <--, 3.23 <--, 4.70 <--, 4.58 <--, 3.98 <--, 3.68 <--, 3.91 <--, 3.29 <--, 4.17 <--, 3.97 <--, 3.78 <--, 3.37 <--, 4.40 <--, 4.26 <--, 4.09 <--    Phosphorus Level, Serum: 3.6 mg/dL (08-09 @ 05:54)  Albumin, Serum: 1.9 g/dL (08-09 @ 05:54)  Phosphorus Level, Serum: 3.4 mg/dL (08-08 @ 23:44)  Phosphorus Level, Serum: 5.1 mg/dL (08-08 @ 17:19)                              8.7    22.22 )-----------( 321      ( 09 Aug 2022 05:52 )             26.9     Urine Studies:  Urinalysis - [07-25-22 @ 18:28]      Color Yellow / Appearance Slightly Turbid / SG 1.020 / pH 5.5      Gluc Negative / Ketone Negative  / Bili Negative / Urobili Negative       Blood Small / Protein 100 / Leuk Est Large / Nitrite Negative      RBC 2 / WBC 40 / Hyaline 5 / Gran  / Sq Epi  / Non Sq Epi 0 / Bacteria Many      Iron 17, TIBC 171, %sat 10      [05-02-22 @ 13:03]  Ferritin 6336      [07-27-22 @ 13:00]  PTH -- (Ca 9.2)      [02-05-22 @ 10:13]   294  HbA1c 6.0      [02-21-20 @ 08:53]  TSH 4.69      [07-06-22 @ 18:36]  Lipid: chol --, , HDL --, LDL --      [07-27-22 @ 13:00]    HBsAg Nonreact      [07-25-22 @ 11:51]  HCV 0.18, Nonreact      [07-25-22 @ 11:51]      RADIOLOGY & ADDITIONAL STUDIES:

## 2022-08-09 NOTE — PROGRESS NOTE ADULT - SUBJECTIVE AND OBJECTIVE BOX
[facility] DIVISION OF KIDNEY DISEASES AND HYPERTENSION   FOLLOW UP NOTE    --------------------------------------------------------------------------------  Chief Complaint: s/p DDRT now with garde 2a rejection    24 hour events/subjective: Pt. was seen and examined today. Overnight events noted. Pt has had HD cathter exchange on 8/8/22 and underwent HD. Pt with significant volume overload with anasarca. Dermatology was consulted and had bx of the skin over the allograft area on 8/8/22 pending results.     PAST HISTORY  --------------------------------------------------------------------------------  No significant changes to PMH, PSH, FHx, SHx, unless otherwise noted    ALLERGIES & MEDICATIONS  --------------------------------------------------------------------------------  Allergies    No Known Allergies    Intolerances      Standing Inpatient Medications  amLODIPine   Tablet 10 milliGRAM(s) Oral daily  chlorhexidine 2% Cloths 1 Application(s) Topical <User Schedule>  doxazosin 4 milliGRAM(s) Oral <User Schedule>  epoetin cortney-epbx (RETACRIT) Injectable 66414 Unit(s) SubCutaneous every 7 days  finasteride 5 milliGRAM(s) Oral daily  fosphenytoin IVPB 200 milliGRAM(s) PE IV Intermittent every 12 hours  heparin   Injectable 5000 Unit(s) SubCutaneous every 12 hours  hydrALAZINE 25 milliGRAM(s) Oral every 8 hours  insulin glargine Injectable (LANTUS) 12 Unit(s) SubCutaneous at bedtime  insulin regular Infusion 3 Unit(s)/Hr IV Continuous <Continuous>  levothyroxine 50 MICROGram(s) Oral daily  melatonin 6 milliGRAM(s) Oral at bedtime  mycophenolate mofetil 250 milliGRAM(s) Oral <User Schedule>  pantoprazole    Tablet 40 milliGRAM(s) Oral before breakfast  polyethylene glycol 3350 17 Gram(s) Oral daily  predniSONE   Tablet 20 milliGRAM(s) Oral daily  senna 2 Tablet(s) Oral at bedtime  sirolimus 2.5 milliGRAM(s) Oral <User Schedule>  trimethoprim   80 mG/sulfamethoxazole 400 mG 1 Tablet(s) Oral daily  valGANciclovir 450 milliGRAM(s) Oral <User Schedule>    PRN Inpatient Medications  acetaminophen     Tablet .. 650 milliGRAM(s) Oral every 6 hours PRN      REVIEW OF SYSTEMS  --------------------------------------------------------------------------------  Limited ROS    VITALS/PHYSICAL EXAM  --------------------------------------------------------------------------------  T(C): 37.1 (08-09-22 @ 07:00), Max: 37.6 (08-08-22 @ 23:00)  HR: 64 (08-09-22 @ 10:00) (53 - 72)  BP: 138/63 (08-09-22 @ 10:00) (138/63 - 218/94)  RR: 19 (08-09-22 @ 10:00) (12 - 26)  SpO2: 96% (08-09-22 @ 10:00) (96% - 100%)  Wt(kg): --      08-08-22 @ 07:01  -  08-09-22 @ 07:00  --------------------------------------------------------  IN: 1522.5 mL / OUT: 2520 mL / NET: -997.5 mL    08-09-22 @ 07:01  -  08-09-22 @ 10:51  --------------------------------------------------------  IN: 250 mL / OUT: 0 mL / NET: 250 mL      Physical Exam:  	Gen: weak appearing elderly male  	HEENT: Anicteric  	Pulm: CTA B/L  	CV: S1S2+  	Abd: Soft, +BS               Transplant site: RLQ non tender, surrounding erythema over the transplant site+, bandaids +  	Ext: LE edema B/L++  	Neuro: Awake  	Skin: Warm and dry  	Dialysis access: Left non tunneled IJ catheter+, site clear, no bleeding noted      LABS/STUDIES  --------------------------------------------------------------------------------              8.7    22.22 >-----------<  321      [08-09-22 @ 05:52]              26.9     131  |  94  |  51  ----------------------------<  130      [08-09-22 @ 05:54]  4.0   |  23  |  3.43        Ca     8.3     [08-09-22 @ 05:54]      Mg     2.0     [08-09-22 @ 05:54]      Phos  3.6     [08-09-22 @ 05:54]    TPro  5.6  /  Alb  1.9  /  TBili  0.2  /  DBili  0.2  /  AST  16  /  ALT  14  /  AlkPhos  261  [08-09-22 @ 05:54]    PT/INR: PT 14.3 , INR 1.24       [08-09-22 @ 05:52]  PTT: 31.3       [08-09-22 @ 05:52]    CK 26      [08-08-22 @ 05:42]    Creatinine Trend:  SCr 3.43 [08-09 @ 05:54]  SCr 3.23 [08-08 @ 23:44]  SCr 4.70 [08-08 @ 17:19]  SCr 4.58 [08-08 @ 05:42]  SCr 3.98 [08-07 @ 17:20]    Urinalysis - [07-25-22 @ 18:28]      Color Yellow / Appearance Slightly Turbid / SG 1.020 / pH 5.5      Gluc Negative / Ketone Negative  / Bili Negative / Urobili Negative       Blood Small / Protein 100 / Leuk Est Large / Nitrite Negative      RBC 2 / WBC 40 / Hyaline 5 / Gran  / Sq Epi  / Non Sq Epi 0 / Bacteria Many      HBsAg Nonreact      [07-25-22 @ 11:51]  HCV 0.18, Nonreact      [07-25-22 @ 11:51]      Tacrolimus  Cyclosporine  SirolimusSirolimus (Rapamycin) Level, Serum: 4.0 ng/mL (08-08 @ 05:42)  Sirolimus (Rapamycin) Level, Serum: 3.5 ng/mL (08-07 @ 06:05)  Sirolimus (Rapamycin) Level, Serum: 3.4 ng/mL (08-06 @ 05:37)  Sirolimus (Rapamycin) Level, Serum: 3.4 ng/mL (08-05 @ 04:17)  Sirolimus (Rapamycin) Level, Serum: 5.0 ng/mL (08-03 @ 22:24)  Sirolimus (Rapamycin) Level, Serum: 6.1 ng/mL (08-02 @ 23:18)  Sirolimus (Rapamycin) Level, Serum: 6.7 ng/mL (08-01 @ 23:41)    CMV PCRCMVPCR Log: NotDetec Rqv00WV/mL (07-22 @ 06:18)  CMVPCR Log: NotDetec Xvv95MF/mL (06-26 @ 18:14)  CMVPCR Log: NotDetec Wqc36JD/mL (06-25 @ 14:08)    Parvo PCRParvovirus B19 DNA by PCR: NotDetec IU/mL (07-25 @ 18:32)  Parvovirus B19 DNA by PCR: NotDetec IU/mL (06-26 @ 18

## 2022-08-09 NOTE — PROGRESS NOTE ADULT - ASSESSMENT
66 yr old Male with PMHx ESRD s/p permacath removal 5/10/22 s/p Rt DDRT 4/21/22,  CVA (2019), Afib on apixaban, seizure activity, CAD s/p stents, CABG (2020), HTN, HLD, DM on insulin, gastric/duodenal ulcer, recent hospitalization 4/28/22 -5/10/22 with weakness/anemia found to have perinephric hematoma requiring evacuation and repair of bleeding arterial anastomosis. Curently being treated for UTI with Levaquin Today after developing multiple sz episodes refractory to 5 mg versed IM (EMS) and 2 mg ativan IV in E.D. concerning for status epilepticus requiring intubation for hypoxic respiratory  failure. MICU Consult was called for hypoxic respiratory failure secondary to status epilepticus.  Nephrology consulted for renal failure.     A/P  DDRT on 04/21/22  Course complicated by DGF, was on HD until 4/29/22. Readmitted in May for anemia in the setting of large perinephric hematoma s/p evacuation and repair of arterial anastomosis on 5/02/22. Intra operative biopsy showed no rejection.  ANA was initially sec to  hemodynamic change   stent removal 07/12/22  No hydronephrosis noted on renal US. UA done on 7/25/22 showed LE and pyuria.   allograft US 07/28/22 shows Interval increase in the resistive indices and peak systolic velocities   compared to prior study performed. Short-term imaging follow-up   if clinically indicated.  Grade 1a rejection (biopsy on 7/29) - s/p pulse dose steroids  no DSA.   continue Immunosuppression per transplant team  currently HD dependent   Last HD 08/08/22, UF today - RRT per transplant team    transplant team on board   monitor BMP, U/O     HTN   acceptable   manage by transplant team    monitor BP closely

## 2022-08-09 NOTE — PROGRESS NOTE ADULT - NSPROGADDITIONALINFOA_GEN_ALL_CORE
-Plan discussed with pt/team.  Contact info: [phone] (24/7). pager [phone]  [email or web address] password [email or web address]  Spent 28 minutes assessing pt/labs/meds and discussing plan of care with primary team  Adjusting insulin  Discharge plan  Follow up care

## 2022-08-09 NOTE — PROGRESS NOTE ADULT - SUBJECTIVE AND OBJECTIVE BOX
INTERVAL HPI/OVERNIGHT EVENTS:    S:  Patient is a 66y Male Patient is a 66y old  Male who presents with a chief complaint of Status Epilepticus (09 Aug 2022 12:24)      **No overnight events.**    ________________________________    REVIEW OF SYSTEMS      General: no fevers/chills, no NS	    Skin: see HPI  	  Ophthalmologic: no eye pain or change in vision    Genitourinary: no dysuria or hematuria    Musculoskeletal: no joint pains or weakness	    Neurological:no weakness or tingling        ROS negative except if noted in the above subjective text. All other systems reviewed and negative.    MEDICATIONS  (STANDING):  amLODIPine   Tablet 10 milliGRAM(s) Oral daily  chlorhexidine 2% Cloths 1 Application(s) Topical <User Schedule>  doxazosin 4 milliGRAM(s) Oral <User Schedule>  epoetin cortney-epbx (RETACRIT) Injectable 43347 Unit(s) SubCutaneous every 7 days  finasteride 5 milliGRAM(s) Oral daily  fluconAZOLE   Tablet 400 milliGRAM(s) Oral daily  fosphenytoin IVPB 200 milliGRAM(s) PE IV Intermittent every 12 hours  heparin   Injectable 5000 Unit(s) SubCutaneous every 12 hours  hydrALAZINE 25 milliGRAM(s) Oral every 8 hours  insulin glargine Injectable (LANTUS) 12 Unit(s) SubCutaneous at bedtime  insulin lispro (ADMELOG) corrective regimen sliding scale   SubCutaneous three times a day before meals  insulin lispro (ADMELOG) corrective regimen sliding scale   SubCutaneous at bedtime  levothyroxine 50 MICROGram(s) Oral daily  melatonin 6 milliGRAM(s) Oral at bedtime  mycophenolate mofetil 250 milliGRAM(s) Oral <User Schedule>  pantoprazole    Tablet 40 milliGRAM(s) Oral before breakfast  polyethylene glycol 3350 17 Gram(s) Oral daily  predniSONE   Tablet 20 milliGRAM(s) Oral daily  senna 2 Tablet(s) Oral at bedtime  sirolimus 2.5 milliGRAM(s) Oral <User Schedule>  trimethoprim   80 mG/sulfamethoxazole 400 mG 1 Tablet(s) Oral daily  valGANciclovir 450 milliGRAM(s) Oral <User Schedule>    MEDICATIONS  (PRN):  acetaminophen     Tablet .. 650 milliGRAM(s) Oral every 6 hours PRN Mild Pain (1 - 3)      Allergies    No Known Allergies    Intolerances          O: ICU Vital Signs Last 24 Hrs  T(C): 36.8 (09 Aug 2022 14:15), Max: 37.6 (08 Aug 2022 23:00)  T(F): 98.2 (09 Aug 2022 14:15), Max: 99.7 (08 Aug 2022 23:00)  HR: 61 (09 Aug 2022 14:15) (55 - 72)  BP: 160/68 (09 Aug 2022 14:15) (131/59 - 218/94)  BP(mean): 98 (09 Aug 2022 14:15) (85 - 135)  ABP: --  ABP(mean): --  RR: 13 (09 Aug 2022 14:15) (12 - 26)  SpO2: 96% (09 Aug 2022 14:15) (95% - 100%)    O2 Parameters below as of 09 Aug 2022 07:00  Patient On (Oxygen Delivery Method): room air          I&O's Detail    08 Aug 2022 07:01  -  09 Aug 2022 07:00  --------------------------------------------------------  IN:    Insulin: 20.5 mL    IV PiggyBack: 50 mL    Oral Fluid: 952 mL    Other (mL): 500 mL  Total IN: 1522.5 mL    OUT:    Indwelling Catheter - Urethral (mL): 20 mL    Other (mL): 2500 mL  Total OUT: 2520 mL    Total NET: -997.5 mL      09 Aug 2022 07:01  -  09 Aug 2022 14:46  --------------------------------------------------------  IN:    Insulin: 2.5 mL    Oral Fluid: 500 mL    Other (mL): 500 mL  Total IN: 1002.5 mL    OUT:    Other (mL): 3000 mL  Total OUT: 3000 mL    Total NET: -1997.5 mL          PHYSICAL EXAM:     The patient was alert and oriented X 3, well nourished, and in no  apparent distress.  OP showed no ulcerations  There was no visible lymphadenopathy.  Conjunctiva were non injected  There was no clubbing or edema of extremities.  The scalp, hair, face, eyebrows, lips, OP, neck, chest, back,   extremities X 4, nails were examined.  There was no hyperhidrosis or bromhidrosis.    Of note on skin exam:     indurated well-demarcated plaque erythema on the proximal R thigh, R abdomen  swollen, edematous scrotum and penis  edematous extremities    Labs:                       8.7    22.22 )-----------( 321      ( 09 Aug 2022 05:52 )             26.9     CBC Full  -  ( 09 Aug 2022 05:52 )  WBC Count : 22.22 K/uL  RBC Count : 2.90 M/uL  Hemoglobin : 8.7 g/dL  Hematocrit : 26.9 %  Platelet Count - Automated : 321 K/uL  Mean Cell Volume : 92.8 fl  Mean Cell Hemoglobin : 30.0 pg  Mean Cell Hemoglobin Concentration : 32.3 gm/dL  Auto Neutrophil # : 17.99 K/uL  Auto Lymphocyte # : 0.60 K/uL  Auto Monocyte # : 1.66 K/uL  Auto Eosinophil # : 0.14 K/uL  Auto Basophil # : 0.11 K/uL  Auto Neutrophil % : 81.0 %  Auto Lymphocyte % : 2.7 %  Auto Monocyte % : 7.5 %  Auto Eosinophil % : 0.6 %  Auto Basophil % : 0.5 %    08-09    131<L>  |  94<L>  |  51<H>  ----------------------------<  130<H>  4.0   |  23  |  3.43<H>    Ca    8.3<L>      09 Aug 2022 05:54  Phos  3.6     08-09  Mg     2.0     08-09    TPro  5.6<L>  /  Alb  1.9<L>  /  TBili  0.2  /  DBili  0.2  /  AST  16  /  ALT  14  /  AlkPhos  261<H>  08-09    PT/INR - ( 09 Aug 2022 05:52 )   PT: 14.3 sec;   INR: 1.24 ratio         PTT - ( 09 Aug 2022 05:52 )  PTT:31.3 sec    CAPILLARY BLOOD GLUCOSE  269 (08 Aug 2022 13:50)      POCT Blood Glucose.: 193 mg/dL (09 Aug 2022 12:02)     INTERVAL HPI/OVERNIGHT EVENTS:    S:  Patient is a 66y Male Patient is a 66y old  Male who presents with a chief complaint of Status Epilepticus (09 Aug 2022 12:24)      **No overnight events.**    Skin lesion and pain is largely unchanged. Son at bedside.    REVIEW OF SYSTEMS      General: no fevers/chills, no NS	    Skin: see HPI  	  Ophthalmologic: no eye pain or change in vision    Genitourinary: no dysuria or hematuria    Musculoskeletal: no joint pains or weakness	    Neurological:no weakness or tingling        ROS negative except if noted in the above subjective text. All other systems reviewed and negative.    MEDICATIONS  (STANDING):  amLODIPine   Tablet 10 milliGRAM(s) Oral daily  chlorhexidine 2% Cloths 1 Application(s) Topical <User Schedule>  doxazosin 4 milliGRAM(s) Oral <User Schedule>  epoetin cortney-epbx (RETACRIT) Injectable 42095 Unit(s) SubCutaneous every 7 days  finasteride 5 milliGRAM(s) Oral daily  fluconAZOLE   Tablet 400 milliGRAM(s) Oral daily  fosphenytoin IVPB 200 milliGRAM(s) PE IV Intermittent every 12 hours  heparin   Injectable 5000 Unit(s) SubCutaneous every 12 hours  hydrALAZINE 25 milliGRAM(s) Oral every 8 hours  insulin glargine Injectable (LANTUS) 12 Unit(s) SubCutaneous at bedtime  insulin lispro (ADMELOG) corrective regimen sliding scale   SubCutaneous three times a day before meals  insulin lispro (ADMELOG) corrective regimen sliding scale   SubCutaneous at bedtime  levothyroxine 50 MICROGram(s) Oral daily  melatonin 6 milliGRAM(s) Oral at bedtime  mycophenolate mofetil 250 milliGRAM(s) Oral <User Schedule>  pantoprazole    Tablet 40 milliGRAM(s) Oral before breakfast  polyethylene glycol 3350 17 Gram(s) Oral daily  predniSONE   Tablet 20 milliGRAM(s) Oral daily  senna 2 Tablet(s) Oral at bedtime  sirolimus 2.5 milliGRAM(s) Oral <User Schedule>  trimethoprim   80 mG/sulfamethoxazole 400 mG 1 Tablet(s) Oral daily  valGANciclovir 450 milliGRAM(s) Oral <User Schedule>    MEDICATIONS  (PRN):  acetaminophen     Tablet .. 650 milliGRAM(s) Oral every 6 hours PRN Mild Pain (1 - 3)      Allergies    No Known Allergies    Intolerances          O: ICU Vital Signs Last 24 Hrs  T(C): 36.8 (09 Aug 2022 14:15), Max: 37.6 (08 Aug 2022 23:00)  T(F): 98.2 (09 Aug 2022 14:15), Max: 99.7 (08 Aug 2022 23:00)  HR: 61 (09 Aug 2022 14:15) (55 - 72)  BP: 160/68 (09 Aug 2022 14:15) (131/59 - 218/94)  BP(mean): 98 (09 Aug 2022 14:15) (85 - 135)  ABP: --  ABP(mean): --  RR: 13 (09 Aug 2022 14:15) (12 - 26)  SpO2: 96% (09 Aug 2022 14:15) (95% - 100%)    O2 Parameters below as of 09 Aug 2022 07:00  Patient On (Oxygen Delivery Method): room air          I&O's Detail    08 Aug 2022 07:01  -  09 Aug 2022 07:00  --------------------------------------------------------  IN:    Insulin: 20.5 mL    IV PiggyBack: 50 mL    Oral Fluid: 952 mL    Other (mL): 500 mL  Total IN: 1522.5 mL    OUT:    Indwelling Catheter - Urethral (mL): 20 mL    Other (mL): 2500 mL  Total OUT: 2520 mL    Total NET: -997.5 mL      09 Aug 2022 07:01  -  09 Aug 2022 14:46  --------------------------------------------------------  IN:    Insulin: 2.5 mL    Oral Fluid: 500 mL    Other (mL): 500 mL  Total IN: 1002.5 mL    OUT:    Other (mL): 3000 mL  Total OUT: 3000 mL    Total NET: -1997.5 mL          PHYSICAL EXAM:     The patient was alert and oriented X 3, well nourished, and in no  apparent distress.  OP showed no ulcerations  There was no visible lymphadenopathy.  Conjunctiva were non injected  There was no clubbing or edema of extremities.  The scalp, hair, face, eyebrows, lips, OP, neck, chest, back,   extremities X 4, nails were examined.  There was no hyperhidrosis or bromhidrosis.    Of note on skin exam:     indurated well-demarcated plaque erythema on the proximal R thigh, R abdomen  swollen, edematous scrotum and penis  edematous extremities    Labs:                       8.7    22.22 )-----------( 321      ( 09 Aug 2022 05:52 )             26.9     CBC Full  -  ( 09 Aug 2022 05:52 )  WBC Count : 22.22 K/uL  RBC Count : 2.90 M/uL  Hemoglobin : 8.7 g/dL  Hematocrit : 26.9 %  Platelet Count - Automated : 321 K/uL  Mean Cell Volume : 92.8 fl  Mean Cell Hemoglobin : 30.0 pg  Mean Cell Hemoglobin Concentration : 32.3 gm/dL  Auto Neutrophil # : 17.99 K/uL  Auto Lymphocyte # : 0.60 K/uL  Auto Monocyte # : 1.66 K/uL  Auto Eosinophil # : 0.14 K/uL  Auto Basophil # : 0.11 K/uL  Auto Neutrophil % : 81.0 %  Auto Lymphocyte % : 2.7 %  Auto Monocyte % : 7.5 %  Auto Eosinophil % : 0.6 %  Auto Basophil % : 0.5 %    08-09    131<L>  |  94<L>  |  51<H>  ----------------------------<  130<H>  4.0   |  23  |  3.43<H>    Ca    8.3<L>      09 Aug 2022 05:54  Phos  3.6     08-09  Mg     2.0     08-09    TPro  5.6<L>  /  Alb  1.9<L>  /  TBili  0.2  /  DBili  0.2  /  AST  16  /  ALT  14  /  AlkPhos  261<H>  08-09    PT/INR - ( 09 Aug 2022 05:52 )   PT: 14.3 sec;   INR: 1.24 ratio         PTT - ( 09 Aug 2022 05:52 )  PTT:31.3 sec    CAPILLARY BLOOD GLUCOSE  269 (08 Aug 2022 13:50)      POCT Blood Glucose.: 193 mg/dL (09 Aug 2022 12:02)

## 2022-08-09 NOTE — PROVIDER CONTACT NOTE (OTHER) - ASSESSMENT
Redness extending past marked area made during day shift. Pt complains of tenderness. Abd is firm in RUQ and RLQ. Although spreading of redness, not more red than noted prior. New redness marked, timed, dated.

## 2022-08-09 NOTE — PROGRESS NOTE ADULT - ASSESSMENT
==== incomplete ====  #Skin plaque, necrotizing fascitis v cellulitis  - Necrotizing fasciitis is a deep and often devastating bacterial infection that tracks along fascial planes and expands well beyond any outward cutaneous signs of infection (eg, erythema). It may be classified as polymicrobial (type 1) or monomicrobial (type 2). Type 1 infections are caused by aerobic and anaerobic organisms and generally affect hosts who are immunocompromised, those with underlying illness (such as diabetes mellitus), and elderly patients. Type 2 infections are most commonly caused by Streptococcus pyogenes, although they can be caused by methicillin-resistant Staphylococcus aureus (MRSA); they can occur in healthy individuals with no past medical history.  - Favor an infectious etiology, in the setting fo immunosuppression -- patient has already been covered on broad spectrum abx  - F/u H&E and tissue culture for bacterial/fungal/AFB     The patient's chart was reviewed in addition to being seen and examined at bedside with the dermatology attending.  Recommendations were communicated with the primary team.  Please page [phone] with a 10-digit call-back number for further related questions.    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 In dermatology there are reports of privileged localization of cutaneous lesions on injured skin which, therefore, represents a typical condition  - Surrounding previous transplant incision site  - Favor an infectious etiology, in the setting fo immunosuppression -- patient has already been covered on broad spectrum abx  - F/u H&E and tissue culture for bacterial/fungal/AFB   - Will send tissue sample for PCR infectious testing    The patient's chart was reviewed in addition to being seen and examined at bedside with the dermatology attending.  Recommendations were communicated with the primary team.  Please page [phone] with a 10-digit call-back number for further related questions.    Heri Guan MD  Resident Physician, PGY-3  [facility] Dermatology  Pager: [phone]  Office: [phone]

## 2022-08-09 NOTE — PROGRESS NOTE ADULT - SUBJECTIVE AND OBJECTIVE BOX
Transplant Surgery - Multidisciplinary Progress Note  --------------------------------------------------------------  DDRT     4/21/2022             Present:  Patient seen with multidisciplinary team including Transplant Surgeon: Dr. Tena,  Nephrologist: Dr. NORBERTO Lomeli, transplant pharmacist CELIA Martinez, nephrology fellow, [facility] Dr. Majano, NP Jensen. Disciplines not in attendance will be notified of the plan.      66M with PMH: DM type II, HTN, CAD s/p CABG in 2020, AFib on Eliquis, CVA in 2019 due to Afib, Seizure d/o following CVA, last episode was on 4/8/22, h/o GIB in 2/2022 EGD with duodenal ulcer.  ESRD due to DM was on HD since 2019.     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  Donor was 58 , KDPI 82%, DCD, single vessels and ureter, HLA mismatch 1, 2, 2. No DSA, cPRA 0%. 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 Re-admitted in May for anemia in the setting of large perinephric hematoma s/p evacuation and repair of arterial anastomosis on 5/2/22. Intra operative biopsy showed no rejection, Creatinine ranging ~2mg/dL.    In Rehab:  He was recently dx with klebsiella UTI with Ertapenem - then switched to Levaquin    He also had parainfluenza pneumonia. Was at rehab progressing well.      Hospital course:  - 6/10: transferred from rehab facility for seizure status epilepticus. Intubated for respiratory protection and transferred to MICU.  EEG showed no seizure activity. Neuro consulted.   - 6/11: Extubated. Found to have R pleural effusion. s/p Thoracentesis 1.3L. Eliquis changed to heparin drip.  Post procedure drop in H&H. 5u PRBC, 2 u FFP.   - 6/11 evening: Transferred to SICU from MICU for further care in setting of hypoxia, significant R pleural effusion, anemia and hemodynamic instability  - 6/11 Intubated at 9pm and sedated on Precedex.  CT placed, 600ml blood drained.  1L total overnight, started pressors  - 6/11 Received Protamine for PTT >100 (was on Heparin drip started for AF), 2 u FFP and 5u PRBC total  - 6/13 s/p VATS 2.2L old blood removed; second chest tube placed  - 6/18-19: chest tubes removed  - 6/21: ?PRES vs. chronic ischemic changes on MRI, off Envarsus, switched to Belatacept 6/23 with good graft function  - 6/25: Tx to SICU for refractory seizures, improved with IV antiepileptic regimen  - 7/10: Downgraded from SICU to floor.   - 7/11: OR-->URETERAL STENT REMOVED  - 7/15: ESBL Kleb UTI (50-90K), completed Ertapenem x 3 Days  - 7/25: Tx to SICU for Sepsis/ elevated LFTS  - 7/27: Shiley placed for HD  - 7/28: ongoing fevers -> started Ertapenem/Fluc  - 7/29: HD restarted + 1U PRBC.  IR bx with 2A rejection, started pulse steroids. LFTs have improved  - 8/4: refractory seizures    Interval events:   -No seizures seen on EEG, no obvious seizures overnight. On Fosphenytoin 200 BID  -s/p HD daily with removal around 2-3L, s/p permacath exchange yesterday due to decrease HD flow  -more alert passed S&S on PO diet  -R sided erythema around wound has not expanded outside the marked area but more thight ID rec derm consult  - S/P punch biopsy of wound by derm   -LE venous and arterial dopplers  -Was restarted on precedex for agitation overnight      Immunosuppression  Induction:  Thymoglobulin                                        Maintenance:  - Belatacept: 6/23, 7/4, 7/8, 7/18. 8/1, now discontinued  - Now on Sirolimus as of 8/2  - Dec cellcept to 250 BID.   - Pred taper   - Ongoing monitoring for signs of rejection.    Potential Discharge date: pending clinical improvement.     Education:  Medications    Plan of care:  See Below        MEDICATIONS  (STANDING):  amLODIPine   Tablet 10 milliGRAM(s) Oral daily  chlorhexidine 2% Cloths 1 Application(s) Topical <User Schedule>  doxazosin 4 milliGRAM(s) Oral <User Schedule>  epoetin cortney-epbx (RETACRIT) Injectable 89265 Unit(s) SubCutaneous every 7 days  finasteride 5 milliGRAM(s) Oral daily  fosphenytoin IVPB 200 milliGRAM(s) PE IV Intermittent every 12 hours  heparin   Injectable 5000 Unit(s) SubCutaneous every 12 hours  hydrALAZINE 25 milliGRAM(s) Oral every 8 hours  insulin glargine Injectable (LANTUS) 12 Unit(s) SubCutaneous at bedtime  insulin regular Infusion 3 Unit(s)/Hr (3 mL/Hr) IV Continuous <Continuous>  levothyroxine 50 MICROGram(s) Oral daily  melatonin 6 milliGRAM(s) Oral at bedtime  mycophenolate mofetil 250 milliGRAM(s) Oral <User Schedule>  pantoprazole    Tablet 40 milliGRAM(s) Oral before breakfast  polyethylene glycol 3350 17 Gram(s) Oral daily  predniSONE   Tablet 20 milliGRAM(s) Oral daily  senna 2 Tablet(s) Oral at bedtime  sirolimus 2.5 milliGRAM(s) Oral <User Schedule>  trimethoprim   80 mG/sulfamethoxazole 400 mG 1 Tablet(s) Oral daily  valGANciclovir 450 milliGRAM(s) Oral <User Schedule>    MEDICATIONS  (PRN):  acetaminophen     Tablet .. 650 milliGRAM(s) Oral every 6 hours PRN Mild Pain (1 - 3)      PAST MEDICAL & SURGICAL HISTORY:  Hypertension      Diabetes      Dyslipidemia      CAD (Coronary Artery Disease)  with Stents in 06/2009 and 6/2019, s/p off-pump C3L on 8/13/20      Hypothyroidism      CVA (cerebral vascular accident)  12/13/19 with residual bilateral weakness      Anemia      PEG (percutaneous endoscopic gastrostomy) status  removed July 2020      Intubation of airway performed without difficulty  Dec 2019  CVA c/b status epilepticus requiring intubation and PEG in 12/2019-      ESRD on dialysis  M/W/F      Seizures  after CVA, last seizure was last week 4/07/22      History of insertion of stent into coronary artery bypass graft  2009 and 2019      S/P CABG x 3  off pump C3L on 8/13/20          Vital Signs Last 24 Hrs  T(C): 37.1 (09 Aug 2022 07:00), Max: 37.6 (08 Aug 2022 23:00)  T(F): 98.8 (09 Aug 2022 07:00), Max: 99.7 (08 Aug 2022 23:00)  HR: 64 (09 Aug 2022 10:00) (53 - 72)  BP: 138/63 (09 Aug 2022 10:00) (138/63 - 218/94)  BP(mean): 91 (09 Aug 2022 10:00) (88 - 135)  RR: 19 (09 Aug 2022 10:00) (12 - 26)  SpO2: 96% (09 Aug 2022 10:00) (96% - 100%)    Parameters below as of 09 Aug 2022 07:00  Patient On (Oxygen Delivery Method): room air        I&O's Summary    08 Aug 2022 07:01  -  09 Aug 2022 07:00  --------------------------------------------------------  IN: 1522.5 mL / OUT: 2520 mL / NET: -997.5 mL    09 Aug 2022 07:01  -  09 Aug 2022 11:17  --------------------------------------------------------  IN: 252 mL / OUT: 0 mL / NET: 252 mL                              8.7    22.22 )-----------( 321      ( 09 Aug 2022 05:52 )             26.9     08-09    131<L>  |  94<L>  |  51<H>  ----------------------------<  130<H>  4.0   |  23  |  3.43<H>    Ca    8.3<L>      09 Aug 2022 05:54  Phos  3.6     08-09  Mg     2.0     08-09    TPro  5.6<L>  /  Alb  1.9<L>  /  TBili  0.2  /  DBili  0.2  /  AST  16  /  ALT  14  /  AlkPhos  261<H>  08-09          Culture - Tissue with Gram Stain (collected 08-08-22 @ 17:44)  Source: .Tissue Other  Gram Stain (08-09-22 @ 02:45):    No polymorphonuclear cells seen seen per low power field    No organisms seen per oil power field    Culture - Fungal, Tissue (collected 08-08-22 @ 17:44)  Source: .Tissue Other  Preliminary Report (08-09-22 @ 08:13):    Testing in progress    Culture - Blood (collected 08-03-22 @ 23:46)  Source: .Blood Blood  Final Report (08-09-22 @ 03:00):    No Growth Final    Culture - Blood (collected 08-03-22 @ 23:46)  Source: .Blood Blood  Final Report (08-09-22 @ 03:00):    No Growth Final                REVIEW OF SYSTEMS  --------------------------------------------------------------------------------  unable to obtain full accurate ROS, but denies CP, SOB.    PHYSICAL EXAM:  Constitutional: awake, weak, not coherent  Eyes: Anicteric  ENMT: nc/at  Respiratory: not tachypneic, non-labored on RA    Cardiovascular: normotensive, regular rate on monitor   Gastrointestinal: Soft, non distended, nontender, RLQ incisional scar healed, some erythema.  anasarca throughout  Genitourinary: anuric on HD   Extremities: SCD's in place and working bilaterally, overall with worsening edema  Vascular: Palpable dp pulses bilaterally  Neurological: AAOx3  Skin: no rashes, ulcerations or lesions  Musculoskeletal: Moving all extremities  Psychiatric: overall calm, but with  periods of agitation

## 2022-08-09 NOTE — PROGRESS NOTE ADULT - ASSESSMENT
WORK UP  s/p LP which was not suggestive of infectious process    U/A (7/12) 161 WBC  UCx (7/13) 50-99K ESBL Klebsiella   s/p 3 days of Ertapenem    UA (7/25) with 40 WBC  UCx (7/25) 50-99k ESBL Klebsiella     RUQ (7/25) with hepatomegaly   CT c/a/p (7/25) with only small perinephric fluid collection, small volume ascites.  Doppler US R Kidney (7/27) with mild thickening of collecting system and ureter and small   Doppler US R Kidney (7/29) with fullness of collecting system and continued urothelial thickening.     RVP (7/25) Negative  CMV PCR (7/22) Negative  EBV PCR (7/25) Negative   HBV PCR (7/25) Negative   HHV-6 PCR (7/25) POSITIVE   Parvovirus IgM and PCR (7/26) Negative   HSV 1/2 PCR (7/26) Negative   Adenovirus PCR (7/26) Negative    CT A/P (8/5) with No drainable fluid collections and Right psoas retroperitoneal hematoma.    DIAGNOSIS and IMPRESSION  66M with ESRD s/p PermCath removal 5/10/22 s/p Rt DDRT 4/21/22,  CVA (2019), Afib on apixaban, seizure activity, CAD s/p stents, CABG (2020), HTN, HLD, DM on insulin, gastric/duodenal ulcer, recent hospitalization 4/28/22 -5/10/22 with weakness/anemia found to have perinephric hematoma requiring evacuation and repair of bleeding arterial anastomosis admitted 6/10 for status epilepticus requiring intubation for hypoxic respiratory failure.     #Status epilepticus   #Rash, Erythema overlying Renal Transplant  #Leukocytosis   #ESRD s/p DDRT (4/21/2022)    RECOMMENDATIONS  - last seizures overnight 8/3 to 8/4  - leukocytosis ?infectious, but can be attributed to steroids and stress-induced   - c/w Vancomycin by level  - Completed 10 days of Ertapenem for transplant pyelo, if worsen can start Meropenem   - recommend Derm consult for tissue culture (would send Bacterial, Fungal and AFB culture) and pathology  - f/up repeat BCx         PT TO BE SEEN. PRELIM NOTE  PENDING RECS. PLEASE WAIT FOR FINAL RECS AFTER DISCUSSION WITH ATTENDINGJill Horton DO, PGY-4   ID fellow  [employer] Teams Preferred  After 5pm/weekends call [phone]   WORK UP  s/p LP which was not suggestive of infectious process    U/A (7/12) 161 WBC  UCx (7/13) 50-99K ESBL Klebsiella   s/p 3 days of Ertapenem    UA (7/25) with 40 WBC  UCx (7/25) 50-99k ESBL Klebsiella     RUQ (7/25) with hepatomegaly   CT c/a/p (7/25) with only small perinephric fluid collection, small volume ascites.  Doppler US R Kidney (7/27) with mild thickening of collecting system and ureter and small   Doppler US R Kidney (7/29) with fullness of collecting system and continued urothelial thickening.     RVP (7/25) Negative  CMV PCR (7/22) Negative  EBV PCR (7/25) Negative   HBV PCR (7/25) Negative   HHV-6 PCR (7/25) POSITIVE   Parvovirus IgM and PCR (7/26) Negative   HSV 1/2 PCR (7/26) Negative   Adenovirus PCR (7/26) Negative    CT A/P (8/5) with No drainable fluid collections and Right psoas retroperitoneal hematoma.    DIAGNOSIS and IMPRESSION  66M with ESRD s/p PermCath removal 5/10/22 s/p Rt DDRT 4/21/22,  CVA (2019), Afib on apixaban, seizure activity, CAD s/p stents, CABG (2020), HTN, HLD, DM on insulin, gastric/duodenal ulcer, recent hospitalization 4/28/22 -5/10/22 with weakness/anemia found to have perinephric hematoma requiring evacuation and repair of bleeding arterial anastomosis admitted 6/10 for status epilepticus requiring intubation for hypoxic respiratory failure.     #Status epilepticus   #Rash, Erythema overlying Renal Transplant  #Leukocytosis   #ESRD s/p DDRT (4/21/2022)    RECOMMENDATIONS  - last seizures overnight 8/3 to 8/4  - leukocytosis ?infectious, but can be attributed to steroids and stress-induced   - c/w Vancomycin by level, last trough 8/9 therapeutic   - Completed 10 days of Ertapenem for transplant pyelo, if worsen can start Meropenem   - recommend Derm consult for tissue culture (would send Bacterial, Fungal and AFB culture) and pathology  - f/up repeat BCx         PT TO BE SEEN. PRELIM NOTE  PENDING RECS. PLEASE WAIT FOR FINAL RECS AFTER DISCUSSION WITH ATTENDINGJill Horton DO, PGY-4   ID fellow  [employer] Teams Preferred  After 5pm/weekends call [phone]   WORK UP  s/p LP which was not suggestive of infectious process    U/A (7/12) 161 WBC  UCx (7/13) 50-99K ESBL Klebsiella   s/p 3 days of Ertapenem    UA (7/25) with 40 WBC  UCx (7/25) 50-99k ESBL Klebsiella     RUQ (7/25) with hepatomegaly   CT c/a/p (7/25) with only small perinephric fluid collection, small volume ascites.  Doppler US R Kidney (7/27) with mild thickening of collecting system and ureter and small   Doppler US R Kidney (7/29) with fullness of collecting system and continued urothelial thickening.     RVP (7/25) Negative  CMV PCR (7/22) Negative  EBV PCR (7/25) Negative   HBV PCR (7/25) Negative   HHV-6 PCR (7/25) POSITIVE   Parvovirus IgM and PCR (7/26) Negative   HSV 1/2 PCR (7/26) Negative   Adenovirus PCR (7/26) Negative    CT A/P (8/5) with No drainable fluid collections and Right psoas retroperitoneal hematoma.    DIAGNOSIS and IMPRESSION  66M with ESRD s/p PermCath removal 5/10/22 s/p Rt DDRT 4/21/22,  CVA (2019), Afib on apixaban, seizure activity, CAD s/p stents, CABG (2020), HTN, HLD, DM on insulin, gastric/duodenal ulcer, recent hospitalization 4/28/22 -5/10/22 with weakness/anemia found to have perinephric hematoma requiring evacuation and repair of bleeding arterial anastomosis admitted 6/10 for status epilepticus requiring intubation for hypoxic respiratory failure.     #Status epilepticus   #Rash, Erythema overlying Renal Transplant  #Leukocytosis   #ESRD s/p DDRT (4/21/2022)    RECOMMENDATIONS  - last seizures overnight 8/3 to 8/4  - leukocytosis ?infectious, but can be attributed to steroids and stress-induced   - c/w Vancomycin by level, last trough 8/9 therapeutic   - Completed 10 days of Ertapenem for transplant pyelo, if worsen can start Meropenem   - c/w Valgancyclovir and Bactrim for ppx  - recommend Derm consult for tissue culture (would send Bacterial, Fungal and AFB culture) and pathology  - f/up repeat BCx         PT TO BE SEEN. PRELIM NOTE  PENDING RECS. PLEASE WAIT FOR FINAL RECS AFTER DISCUSSION WITH ATTENDING#    Rogers Vladimir Luchana, DO, PGY-4   ID fellow  [employer] Teams Preferred  After 5pm/weekends call [phone]   WORK UP  s/p LP which was not suggestive of infectious process    U/A (7/12) 161 WBC  UCx (7/13) 50-99K ESBL Klebsiella   s/p 3 days of Ertapenem    UA (7/25) with 40 WBC  UCx (7/25) 50-99k ESBL Klebsiella     RUQ (7/25) with hepatomegaly   CT c/a/p (7/25) with only small perinephric fluid collection, small volume ascites.  Doppler US R Kidney (7/27) with mild thickening of collecting system and ureter and small   Doppler US R Kidney (7/29) with fullness of collecting system and continued urothelial thickening.     RVP (7/25) Negative  CMV PCR (7/22) Negative  EBV PCR (7/25) Negative   HBV PCR (7/25) Negative   HHV-6 PCR (7/25) POSITIVE   Parvovirus IgM and PCR (7/26) Negative   HSV 1/2 PCR (7/26) Negative   Adenovirus PCR (7/26) Negative    CT A/P (8/5) with No drainable fluid collections and Right psoas retroperitoneal hematoma.    DIAGNOSIS and IMPRESSION  66M with ESRD s/p PermCath removal 5/10/22 s/p Rt DDRT 4/21/22,  CVA (2019), Afib on apixaban, seizure activity, CAD s/p stents, CABG (2020), HTN, HLD, DM on insulin, gastric/duodenal ulcer, recent hospitalization 4/28/22 -5/10/22 with weakness/anemia found to have perinephric hematoma requiring evacuation and repair of bleeding arterial anastomosis admitted 6/10 for status epilepticus requiring intubation for hypoxic respiratory failure.     #Status epilepticus   #Rash, Erythema overlying Renal Transplant  #Leukocytosis   #ESRD s/p DDRT (4/21/2022)    RECOMMENDATIONS  - last seizures overnight 8/3 to 8/4  - leukocytosis ?infectious, but can be attributed to steroids and stress-induced   - c/w Vancomycin by level, last trough 8/9 therapeutic   - Completed 10 days of Ertapenem for transplant pyelo, if worsen can start Meropenem   - c/w Valgancyclovir and Bactrim for ppx  - Derm following; f/up tissue culture (would send Bacterial, Fungal and AFB culture) and pathology  - f/up repeat BCx         PT TO BE SEEN. PRELIM NOTE  PENDING RECS. PLEASE WAIT FOR FINAL RECS AFTER DISCUSSION WITH ATTENDING#    Rogers Horton DO, PGY-4   ID fellow  [employer] Teams Preferred  After 5pm/weekends call [phone]   WORK UP  s/p LP which was not suggestive of infectious process    U/A (7/12) 161 WBC  UCx (7/13) 50-99K ESBL Klebsiella   s/p 3 days of Ertapenem    UA (7/25) with 40 WBC  UCx (7/25) 50-99k ESBL Klebsiella     RUQ (7/25) with hepatomegaly   CT c/a/p (7/25) with only small perinephric fluid collection, small volume ascites.  Doppler US R Kidney (7/27) with mild thickening of collecting system and ureter and small   Doppler US R Kidney (7/29) with fullness of collecting system and continued urothelial thickening.     RVP (7/25) Negative  CMV PCR (7/22) Negative  EBV PCR (7/25) Negative   HBV PCR (7/25) Negative   HHV-6 PCR (7/25) POSITIVE   Parvovirus IgM and PCR (7/26) Negative   HSV 1/2 PCR (7/26) Negative   Adenovirus PCR (7/26) Negative    CT A/P (8/5) with No drainable fluid collections and Right psoas retroperitoneal hematoma.    DIAGNOSIS and IMPRESSION  66M with ESRD s/p PermCath removal 5/10/22 s/p Rt DDRT 4/21/22,  CVA (2019), Afib on apixaban, seizure activity, CAD s/p stents, CABG (2020), HTN, HLD, DM on insulin, gastric/duodenal ulcer, recent hospitalization 4/28/22 -5/10/22 with weakness/anemia found to have perinephric hematoma requiring evacuation and repair of bleeding arterial anastomosis admitted 6/10 for status epilepticus requiring intubation for hypoxic respiratory failure.     #Status epilepticus   #Rash, Erythema overlying Renal Transplant  #Leukocytosis   #ESRD s/p DDRT (4/21/2022)    RECOMMENDATIONS  - last seizures overnight 8/3 to 8/4  - leukocytosis ?infectious, but can be attributed to steroids and stress-induced   - c/w Vancomycin by level, last trough 8/9 therapeutic   - Completed 10 days of Ertapenem for transplant pyelo, if worsen can start Meropenem   - c/w Valgancyclovir and Bactrim for ppx  - Derm following; f/up tissue culture (would send Bacterial, Fungal and AFB culture) and pathology  - f/up repeat BCx       Rogers Horton DO, PGY-4   ID fellow  [employer] Teams Preferred  After 5pm/weekends call [phone]

## 2022-08-09 NOTE — PROGRESS NOTE ADULT - ASSESSMENT
66 year old male with PMH of  DM type II, HTN, CAD s/p CABG in 2020, Afib on Eliquis, CVA in 2019 due to Afib, Seizure d/o (last episode was on 4/8/22), h/o GI bleed in 2/2022 EGD with duodenal ulcer and ESRD on HD s/p R DDRT from DCD donor on 4/21/22 complicated by DGF requiring HD until 4/29/22, later had good graft function who was readmitted with status epilepticus in setting of UTI/antibiotics and sub-therapeutic valproic acid level.  Transferred to SICU on 7/25 with signs of sepsis. 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 Received pulse steroids (Solumedrol 500 on 7/30 -> 250 on 7/31).     1. 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 No  DSA.   s/p pulse dose steroids. Thymo was not given to treat the rejection as he is too frail and at increased risk for infections. Now with failed allograft function, Remains anuric and is dialysis dependent.   Pt underwent IR guided HD catheter exchange on 8/8/22 Last HD done on 8/8 with UF of 2L. Pt with volume overload and anuric. Plan for UF with goal of 2.5 kgs. Monitor for labs and urineoutput. Bladder ray prn.      2. IS meds- was on Belatacept. now on Sirolimus ,  mg po bid and steroid taper.     3. AMS , seizures - had 3 episodes of seizures on 8/3. CT head on 8/3 was negative. On Fosphenytoin. No further episodes of seizures since 8/3. Neurology following.    4. DM -  on Lantus and lispro.     5. Cellulitis over RLQ - WBC increased to 22K today. On Vancomycin . CT A/P on 8/6 revealed a retroperitoneal hematoma. Txp USG 8/6  - Patent renal artery vasculature. Stable appearance the transplant kidney .Persistent elevated resistive indices  Diffuse abdominal wall edematous changes right lower quadrant without focal collection, cultures from 8/3  negative so far. Skin bx done on 8/8/22 pending results. ID follow up. Send Fungitell,. Start Fluconazole. Monitor fevers and labs.    6.  ESBL klebsiella UTI (7/15 + UCx, 7/25 +UCx)- completed a course of  Ertapenam on  8/3 + Fluconazole.    7. LE edema: Pt with B/L LE/UE swelling R>L, check doppler studies of UE and LE to rule out DVT. Start Subq Heparin. Cannot use Eliquis given interaction with Dilantin.

## 2022-08-09 NOTE — PROGRESS NOTE ADULT - NS ATTEND AMEND GEN_ALL_CORE FT
65 y/o male w/ a PMHx of CAD s/p CABG, AF on Eliquis c/b CVA c/b seizures, HTN, HLD, DM type II, hypothyroidism, GI bleed due to a duodenal ulcer, and ESRD s/p DDRT 4/21/22 c/b DGF requiring HD & large perinephric hematoma 5/22 s/p evacuation w/ revision of arterial anastomosis who presented in status epilepticus    On fosphenytoin without any further seizure. Mental status unchanged  Hemodynamically stable, off Coreg sec to bradycardia  Saturating well on RA  On Po with assistance  Antirejection meds  ppx abx  S/P  HD catheter replacement, continue HD with fluid removal.  On Abx vanco for cellulitis of rt flank. CT A/P did not show any collection or gas. Vanco level 19  Duplex of lower and UE  Start pharm DVT ppx with SQ Heparin

## 2022-08-09 NOTE — PROGRESS NOTE ADULT - NUTRITIONAL ASSESSMENT
Diet, Soft and Bite Sized:   Consistent Carbohydrate {Evening Snack} (CSTCHOSN)  For patients receiving Renal Replacement - No Protein Restr, No Conc K, No Conc Phos, Low Sodium (RENAL)  Supplement Feeding Modality:  Oral  Nepro Cans or Servings Per Day:  3       Frequency:  Three Times a day (08-07-22 @ 13:47) [Active]      Please see RD assessment and/or follow up.  Managed by primary team as well

## 2022-08-10 NOTE — PROGRESS NOTE ADULT - SUBJECTIVE AND OBJECTIVE BOX
HISTORY:  67 y/o M with PMH: DM type II, HTN, CAD s/p CABG in 2020, Afib on Eliquis, CVA in 2019 due to Afib, Seizure d/o following CVA last episode was on 4/8/22, h/o GI bleed in 2/2022 EGD with duodenal ulcer.  ESRD due to DM was on HD since 2019.     s/p R DCD DDRT (1A/1V/1U + stent)  on 4/21/22.    Donor was 58 , KDPI 82 HLA mismatch 1, 2, 2. No DSA, cPRA 0%. CMV +/+  Course c/b  by DGF, was on HD until 4/29/22.     Re-admitted in May for anemia in the setting of large perinephric hematoma s/p evacuation and repair of arterial anastomosis on 5/2/22. Intra operative biopsy showed no rejection, Creatinine ranging ~2mg/dL.    In Rehab: recently dx klebsiella UTI rx with ertapenem - then switched to Levaquin day #6.    He also had parainfluenza pneumonia. Was at rehab progressing well.      - admitted with status epilepticus  - 6/11 R pleural effusion - s/p thoracentesis 1.3L -> bleeding post procedure  - 6/11 re-intubated, CT placed, 600ml hemothorax drained.    - 6/15 s/p VATS 2.2L old blood removed; second chest tube placed   - 6/18-19 chest tubes removed  - 6/21: PRES, off ENV  - 6/23: started Belatacept  - 6/26: intubated for AMS and airway protection  - 6/28: extubated  - 6/29: LP negative, antibiotics D/Agustin  - seizure free since 6/27  - 7/11: ureteral stent removed  - ESBL Kleb UTI, Erta x 3D  - 7/25 transferred to SICU for sepsis/elevated LFTs  - 7/27 HD restarted via L shiley  - 7/29 IR bx with 2A ACR, s/p pulse steroids  - 8/4 refractory seizures    24 HOUR EVENTS:  - HD today pulled out 2.5L  - Cellulitis biopsied for Post transplant lymphoma  - Off insulin drip. Transitioned to Lantus and ISS.   -Subq Heparin started  - Grew Staphe epidermidis in one bottle. Repeat blood cultures sent.    NEURO  Exam: can follow commands  Meds: acetaminophen     Tablet .. 650 milliGRAM(s) Oral every 6 hours PRN Mild Pain (1 - 3)  fosphenytoin IVPB 200 milliGRAM(s) PE IV Intermittent every 12 hours  melatonin 6 milliGRAM(s) Oral at bedtime      RESPIRATORY  RR: 16 (08-10-22 @ 01:00) (13 - 22)  SpO2: 97% (08-10-22 @ 01:00) (95% - 99%)  Wt(kg): --  Exam: clear breath sounds BL    Meds:     CARDIOVASCULAR  HR: 64 (08-10-22 @ 01:00) (60 - 69)  BP: 166/63 (08-10-22 @ 01:00) (131/59 - 186/75)  BP(mean): 91 (08-10-22 @ 01:00) (82 - 116)  ABP: --  ABP(mean): --  Wt(kg): --  CVP(cm H2O): --  VBG - ( 08 Aug 2022 23:27 )  pH: 7.42  /  pCO2: 41    /  pO2: 47    / HCO3: 27    / Base Excess: 1.9   /  SaO2: 78.3   Lactate: 1.3                Exam: RRR. well perfused  Perfusion     [x]Adequate   [ ]Inadequate  Mentation   [x]Normal       [ ]Reduced  Extremities  [x]Warm         [ ]Cool  Volume Status [ ]Hypervolemic [x]Euvolemic [ ]Hypovolemic  Meds: amLODIPine   Tablet 10 milliGRAM(s) Oral daily  doxazosin 4 milliGRAM(s) Oral <User Schedule>  hydrALAZINE 25 milliGRAM(s) Oral every 8 hours      GI/NUTRITION  Exam: soft nonttp. RLQ cellulitis ttp and spreading erythema over demarcation  Diet: reg  Meds: pantoprazole    Tablet 40 milliGRAM(s) Oral before breakfast  polyethylene glycol 3350 17 Gram(s) Oral daily  senna 2 Tablet(s) Oral at bedtime      GENITOURINARY  I&O's Detail    08-08 @ 07:01  -  08-09 @ 07:00  --------------------------------------------------------  IN:    Insulin: 20.5 mL    IV PiggyBack: 50 mL    Oral Fluid: 952 mL    Other (mL): 500 mL  Total IN: 1522.5 mL    OUT:    Indwelling Catheter - Urethral (mL): 20 mL    Other (mL): 2500 mL  Total OUT: 2520 mL    Total NET: -997.5 mL      08-09 @ 07:01  -  08-10 @ 02:12  --------------------------------------------------------  IN:    Insulin: 2.5 mL    IV PiggyBack: 50 mL    Oral Fluid: 700 mL    Other (mL): 500 mL  Total IN: 1252.5 mL    OUT:    Other (mL): 3000 mL  Total OUT: 3000 mL    Total NET: -1747.5 mL          08-09    130<L>  |  92<L>  |  58<H>  ----------------------------<  229<H>  4.3   |  22  |  3.70<H>    Ca    8.4      09 Aug 2022 17:58  Phos  4.2     08-09  Mg     2.1     08-09    TPro  5.6<L>  /  Alb  1.9<L>  /  TBili  0.2  /  DBili  0.2  /  AST  16  /  ALT  14  /  AlkPhos  261<H>  08-09    Meds:     HEMATOLOGIC  Meds: heparin   Injectable 5000 Unit(s) SubCutaneous every 12 hours                          8.7    22.22 )-----------( 321      ( 09 Aug 2022 05:52 )             26.9     PT/INR - ( 09 Aug 2022 05:52 )   PT: 14.3 sec;   INR: 1.24 ratio         PTT - ( 09 Aug 2022 05:52 )  PTT:31.3 sec    INFECTIOUS DISEASES  T(C): 37.4 (08-09-22 @ 23:00), Max: 37.4 (08-09-22 @ 23:00)  Wt(kg): --  WBC Count: 22.22 K/uL (08-09 @ 05:52)    Recent Cultures:  Specimen Source: .Tissue Other, 08-08 @ 17:44; Results   Testing in progress; Gram Stain:   No polymorphonuclear cells seen seen per low power field  No organisms seen per oil power field; Organism: --  Specimen Source: .Blood Blood-Peripheral, 08-08 @ 04:01; Results   Growth in aerobic bottle: Gram Positive Cocci in Clusters  ***Blood Panel PCR results on this specimen are available  approximately 3 hours after the Gram stain result.***  Gram stain, PCR, and/or culture results may not always  correspond due to difference in methodologies.  ************************************************************  This PCR assay was performed by multiplex PCR. This  Assay tests for 66 bacterial and resistance gene targets.  Please refer to the [facility] test directory  at [email or web address] for details.; Gram Stain:   Growth in aerobic bottle: Gram Positive Cocci in Clusters; Organism: Blood Culture PCR  Specimen Source: .Blood Blood, 08-03 @ 23:46; Results   No Growth Final; Gram Stain: --; Organism: --    Meds: epoetin cortney-epbx (RETACRIT) Injectable 11027 Unit(s) SubCutaneous every 7 days  fluconAZOLE   Tablet 400 milliGRAM(s) Oral daily  mycophenolate mofetil 250 milliGRAM(s) Oral <User Schedule>  sirolimus 2.5 milliGRAM(s) Oral <User Schedule>  trimethoprim   80 mG/sulfamethoxazole 400 mG 1 Tablet(s) Oral daily  valGANciclovir 450 milliGRAM(s) Oral <User Schedule>      ENDOCRINE  Capillary Blood Glucose    Meds: finasteride 5 milliGRAM(s) Oral daily  insulin glargine Injectable (LANTUS) 12 Unit(s) SubCutaneous at bedtime  insulin lispro (ADMELOG) corrective regimen sliding scale   SubCutaneous three times a day before meals  insulin lispro (ADMELOG) corrective regimen sliding scale   SubCutaneous at bedtime  levothyroxine 50 MICROGram(s) Oral daily  predniSONE   Tablet 20 milliGRAM(s) Oral daily      ACCESS DEVICES:  [x] Peripheral IV  [ ] Central Venous Line		[ ] R	[ ] L	[ ] IJ	[ ] Fem	[ ] SC	Placed:   [ ] Arterial Line			[ ] R	[ ] L	[ ] Fem	[ ] Rad	[ ] Ax	Placed:   [ ] PICC:					[ ] Mediport  [ ] Urinary Catheter, Date Placed:   [x] Necessity of urinary, arterial, and venous catheters discussed    OTHER MEDICATIONS:  chlorhexidine 2% Cloths 1 Application(s) Topical <User Schedule>  petrolatum white Ointment 1 Application(s) Topical two times a day      IMAGING:  CT A/P    IMPRESSION:  1. Stable examination from 8/5/2022.  2. Small right psoas hematoma.  3. Diffuse anasarca, including ill-defined intermuscular fluid of the right lower quadrant abdominal wall, though no evidence of an organized fluid collection on this noncontrast exam.       HISTORY:  67 y/o M with PMH: DM type II, HTN, CAD s/p CABG in 2020, Afib on Eliquis, CVA in 2019 due to Afib, Seizure d/o following CVA last episode was on 4/8/22, h/o GI bleed in 2/2022 EGD with duodenal ulcer.  ESRD due to DM was on HD since 2019.     s/p R DCD DDRT (1A/1V/1U + stent)  on 4/21/22.    Donor was 58 , KDPI 82 HLA mismatch 1, 2, 2. No DSA, cPRA 0%. CMV +/+  Course c/b  by DGF, was on HD until 4/29/22.     Re-admitted in May for anemia in the setting of large perinephric hematoma s/p evacuation and repair of arterial anastomosis on 5/2/22. Intra operative biopsy showed no rejection, Creatinine ranging ~2mg/dL.    In Rehab: recently dx klebsiella UTI rx with ertapenem - then switched to Levaquin day #6.    He also had parainfluenza pneumonia. Was at rehab progressing well.      - admitted with status epilepticus  - 6/11 R pleural effusion - s/p thoracentesis 1.3L -> bleeding post procedure  - 6/11 re-intubated, CT placed, 600ml hemothorax drained.    - 6/15 s/p VATS 2.2L old blood removed; second chest tube placed   - 6/18-19 chest tubes removed  - 6/21: PRES, off ENV  - 6/23: started Belatacept  - 6/26: intubated for AMS and airway protection  - 6/28: extubated  - 6/29: LP negative, antibiotics D/Agustin  - seizure free since 6/27  - 7/11: ureteral stent removed  - ESBL Kleb UTI, Erta x 3D  - 7/25 transferred to SICU for sepsis/elevated LFTs  - 7/27 HD restarted via L shiley  - 7/29 IR bx with 2A ACR, s/p pulse steroids  - 8/4 refractory seizures    24 HOUR EVENTS:  - HD today pulled out 2.5L  - Cellulitis biopsied for Post transplant lymphoma  - Off insulin drip. Transitioned to Lantus and ISS.   -Subq Heparin started  - Grew Staphe epidermidis in one bottle. Repeat blood cultures sent.  - Gave dose of hydralazine ON for hypertension    NEURO  Exam: can follow commands  Meds: acetaminophen     Tablet .. 650 milliGRAM(s) Oral every 6 hours PRN Mild Pain (1 - 3)  fosphenytoin IVPB 200 milliGRAM(s) PE IV Intermittent every 12 hours  melatonin 6 milliGRAM(s) Oral at bedtime      RESPIRATORY  RR: 16 (08-10-22 @ 01:00) (13 - 22)  SpO2: 97% (08-10-22 @ 01:00) (95% - 99%)  Wt(kg): --  Exam: clear breath sounds BL    Meds:     CARDIOVASCULAR  HR: 64 (08-10-22 @ 01:00) (60 - 69)  BP: 166/63 (08-10-22 @ 01:00) (131/59 - 186/75)  BP(mean): 91 (08-10-22 @ 01:00) (82 - 116)  ABP: --  ABP(mean): --  Wt(kg): --  CVP(cm H2O): --  VBG - ( 08 Aug 2022 23:27 )  pH: 7.42  /  pCO2: 41    /  pO2: 47    / HCO3: 27    / Base Excess: 1.9   /  SaO2: 78.3   Lactate: 1.3                Exam: RRR. well perfused  Perfusion     [x]Adequate   [ ]Inadequate  Mentation   [x]Normal       [ ]Reduced  Extremities  [x]Warm         [ ]Cool  Volume Status [ ]Hypervolemic [x]Euvolemic [ ]Hypovolemic  Meds: amLODIPine   Tablet 10 milliGRAM(s) Oral daily  doxazosin 4 milliGRAM(s) Oral <User Schedule>  hydrALAZINE 25 milliGRAM(s) Oral every 8 hours      GI/NUTRITION  Exam: soft nonttp. RLQ cellulitis ttp and spreading erythema over demarcation  Diet: reg  Meds: pantoprazole    Tablet 40 milliGRAM(s) Oral before breakfast  polyethylene glycol 3350 17 Gram(s) Oral daily  senna 2 Tablet(s) Oral at bedtime      GENITOURINARY  I&O's Detail    08-08 @ 07:01  -  08-09 @ 07:00  --------------------------------------------------------  IN:    Insulin: 20.5 mL    IV PiggyBack: 50 mL    Oral Fluid: 952 mL    Other (mL): 500 mL  Total IN: 1522.5 mL    OUT:    Indwelling Catheter - Urethral (mL): 20 mL    Other (mL): 2500 mL  Total OUT: 2520 mL    Total NET: -997.5 mL      08-09 @ 07:01  -  08-10 @ 02:12  --------------------------------------------------------  IN:    Insulin: 2.5 mL    IV PiggyBack: 50 mL    Oral Fluid: 700 mL    Other (mL): 500 mL  Total IN: 1252.5 mL    OUT:    Other (mL): 3000 mL  Total OUT: 3000 mL    Total NET: -1747.5 mL          08-09    130<L>  |  92<L>  |  58<H>  ----------------------------<  229<H>  4.3   |  22  |  3.70<H>    Ca    8.4      09 Aug 2022 17:58  Phos  4.2     08-09  Mg     2.1     08-09    TPro  5.6<L>  /  Alb  1.9<L>  /  TBili  0.2  /  DBili  0.2  /  AST  16  /  ALT  14  /  AlkPhos  261<H>  08-09    Meds:     HEMATOLOGIC  Meds: heparin   Injectable 5000 Unit(s) SubCutaneous every 12 hours                          8.7    22.22 )-----------( 321      ( 09 Aug 2022 05:52 )             26.9     PT/INR - ( 09 Aug 2022 05:52 )   PT: 14.3 sec;   INR: 1.24 ratio         PTT - ( 09 Aug 2022 05:52 )  PTT:31.3 sec    INFECTIOUS DISEASES  T(C): 37.4 (08-09-22 @ 23:00), Max: 37.4 (08-09-22 @ 23:00)  Wt(kg): --  WBC Count: 22.22 K/uL (08-09 @ 05:52)    Recent Cultures:  Specimen Source: .Tissue Other, 08-08 @ 17:44; Results   Testing in progress; Gram Stain:   No polymorphonuclear cells seen seen per low power field  No organisms seen per oil power field; Organism: --  Specimen Source: .Blood Blood-Peripheral, 08-08 @ 04:01; Results   Growth in aerobic bottle: Gram Positive Cocci in Clusters  ***Blood Panel PCR results on this specimen are available  approximately 3 hours after the Gram stain result.***  Gram stain, PCR, and/or culture results may not always  correspond due to difference in methodologies.  ************************************************************  This PCR assay was performed by multiplex PCR. This  Assay tests for 66 bacterial and resistance gene targets.  Please refer to the [facility] test directory  at [email or web address] for details.; Gram Stain:   Growth in aerobic bottle: Gram Positive Cocci in Clusters; Organism: Blood Culture PCR  Specimen Source: .Blood Blood, 08-03 @ 23:46; Results   No Growth Final; Gram Stain: --; Organism: --    Meds: epoetin cortney-epbx (RETACRIT) Injectable 03856 Unit(s) SubCutaneous every 7 days  fluconAZOLE   Tablet 400 milliGRAM(s) Oral daily  mycophenolate mofetil 250 milliGRAM(s) Oral <User Schedule>  sirolimus 2.5 milliGRAM(s) Oral <User Schedule>  trimethoprim   80 mG/sulfamethoxazole 400 mG 1 Tablet(s) Oral daily  valGANciclovir 450 milliGRAM(s) Oral <User Schedule>      ENDOCRINE  Capillary Blood Glucose    Meds: finasteride 5 milliGRAM(s) Oral daily  insulin glargine Injectable (LANTUS) 12 Unit(s) SubCutaneous at bedtime  insulin lispro (ADMELOG) corrective regimen sliding scale   SubCutaneous three times a day before meals  insulin lispro (ADMELOG) corrective regimen sliding scale   SubCutaneous at bedtime  levothyroxine 50 MICROGram(s) Oral daily  predniSONE   Tablet 20 milliGRAM(s) Oral daily      ACCESS DEVICES:  [x] Peripheral IV  [ ] Central Venous Line		[ ] R	[ ] L	[ ] IJ	[ ] Fem	[ ] SC	Placed:   [ ] Arterial Line			[ ] R	[ ] L	[ ] Fem	[ ] Rad	[ ] Ax	Placed:   [ ] PICC:					[ ] Mediport  [ ] Urinary Catheter, Date Placed:   [x] Necessity of urinary, arterial, and venous catheters discussed    OTHER MEDICATIONS:  chlorhexidine 2% Cloths 1 Application(s) Topical <User Schedule>  petrolatum white Ointment 1 Application(s) Topical two times a day      IMAGING:  CT A/P    IMPRESSION:  1. Stable examination from 8/5/2022.  2. Small right psoas hematoma.  3. Diffuse anasarca, including ill-defined intermuscular fluid of the right lower quadrant abdominal wall, though no evidence of an organized fluid collection on this noncontrast exam.

## 2022-08-10 NOTE — PROGRESS NOTE ADULT - SUBJECTIVE AND OBJECTIVE BOX
[facility] DIVISION OF KIDNEY DISEASES AND HYPERTENSION   FOLLOW UP NOTE    --------------------------------------------------------------------------------  Chief Complaint: s/p DDRT now with garde 2a rejection    24 hour events/subjective: Pt. was seen and examined today. Overnight events noted. Pt has had HD cathter exchange on 8/8/22 and underwent HD. Pt with significant volume overload with anasarca. Pending skin biopsy results. Pt today AM complaining of penile pain. Last HD done on 8/9/22.     PAST HISTORY  --------------------------------------------------------------------------------  No significant changes to PMH, PSH, FHx, SHx, unless otherwise noted    ALLERGIES & MEDICATIONS  --------------------------------------------------------------------------------  Allergies    No Known Allergies    Intolerances      Standing Inpatient Medications  amLODIPine   Tablet 10 milliGRAM(s) Oral daily  chlorhexidine 2% Cloths 1 Application(s) Topical <User Schedule>  doxazosin 4 milliGRAM(s) Oral <User Schedule>  epoetin cortney-epbx (RETACRIT) Injectable 74737 Unit(s) SubCutaneous every 7 days  finasteride 5 milliGRAM(s) Oral daily  fluconAZOLE   Tablet 400 milliGRAM(s) Oral daily  fosphenytoin IVPB 200 milliGRAM(s) PE IV Intermittent every 12 hours  heparin   Injectable 5000 Unit(s) SubCutaneous every 12 hours  hydrALAZINE 25 milliGRAM(s) Oral every 8 hours  insulin glargine Injectable (LANTUS) 12 Unit(s) SubCutaneous at bedtime  insulin lispro (ADMELOG) corrective regimen sliding scale   SubCutaneous three times a day before meals  insulin lispro (ADMELOG) corrective regimen sliding scale   SubCutaneous at bedtime  levothyroxine 50 MICROGram(s) Oral daily  melatonin 6 milliGRAM(s) Oral at bedtime  mycophenolate mofetil 250 milliGRAM(s) Oral <User Schedule>  pantoprazole    Tablet 40 milliGRAM(s) Oral before breakfast  petrolatum white Ointment 1 Application(s) Topical two times a day  polyethylene glycol 3350 17 Gram(s) Oral daily  predniSONE   Tablet 20 milliGRAM(s) Oral daily  senna 2 Tablet(s) Oral at bedtime  sirolimus 2.5 milliGRAM(s) Oral <User Schedule>  trimethoprim   80 mG/sulfamethoxazole 400 mG 1 Tablet(s) Oral daily  valGANciclovir 450 milliGRAM(s) Oral <User Schedule>    PRN Inpatient Medications  acetaminophen     Tablet .. 650 milliGRAM(s) Oral every 6 hours PRN      REVIEW OF SYSTEMS  --------------------------------------------------------------------------------  Gen: anasarca+  Head/Eyes/Ears: No HA   Respiratory: No dyspnea, cough  CV: No chest pain  GI: No abdominal pain, diarrhea, as per hPI  : No dysuria, hematuria  MSK: LE edema+  Skin: No rashes  Heme: No easy bruising or bleeding    All other systems were reviewed and are negative, except as noted.    VITALS/PHYSICAL EXAM  --------------------------------------------------------------------------------  T(C): 37 (08-10-22 @ 07:00), Max: 37.6 (08-10-22 @ 03:00)  HR: 66 (08-10-22 @ 09:00) (60 - 69)  BP: 132/62 (08-10-22 @ 09:00) (131/59 - 186/75)  RR: 18 (08-10-22 @ 09:00) (13 - 20)  SpO2: 97% (08-10-22 @ 09:00) (95% - 99%)  Wt(kg): --      08-09-22 @ 07:01  -  08-10-22 @ 07:00  --------------------------------------------------------  IN: 1406.5 mL / OUT: 3000 mL / NET: -1593.5 mL      Physical Exam:  	Gen: weak appearing elderly male  	HEENT: Anicteric  	Pulm: CTA B/L  	CV: S1S2+  	Abd: Soft, +BS               Transplant site: RLQ non tender, surrounding erythema over the transplant site+,  	Ext: LE edema B/L++  	Neuro: Awake  	Skin: Warm and dry  	Dialysis access: Left non tunneled IJ catheter+, site clear, no bleeding noted    LABS/STUDIES  --------------------------------------------------------------------------------              9.1    20.62 >-----------<  364      [08-10-22 @ 05:57]              27.9     130  |  92  |  68  ----------------------------<  120      [08-10-22 @ 05:57]  4.2   |  21  |  4.25        Ca     8.3     [08-10-22 @ 05:57]      Mg     2.0     [08-10-22 @ 05:57]      Phos  4.1     [08-10-22 @ 05:57]    Creatinine Trend:  SCr 4.25 [08-10 @ 05:57]  SCr 3.70 [08-09 @ 17:58]  SCr 3.43 [08-09 @ 05:54]  SCr 3.23 [08-08 @ 23:44]  SCr 4.70 [08-08 @ 17:19]    Urinalysis - [07-25-22 @ 18:28]      Color Yellow / Appearance Slightly Turbid / SG 1.020 / pH 5.5      Gluc Negative / Ketone Negative  / Bili Negative / Urobili Negative       Blood Small / Protein 100 / Leuk Est Large / Nitrite Negative      RBC 2 / WBC 40 / Hyaline 5 / Gran  / Sq Epi  / Non Sq Epi 0 / Bacteria Many    Tacrolimus  Cyclosporine  SirolimusSirolimus (Rapamycin) Level, Serum: 3.8 ng/mL (08-09 @ 05:52)  Sirolimus (Rapamycin) Level, Serum: 4.0 ng/mL (08-08 @ 05:42)  Sirolimus (Rapamycin) Level, Serum: 3.5 ng/mL (08-07 @ 06:05)  Sirolimus (Rapamycin) Level, Serum: 3.4 ng/mL (08-06 @ 05:37)  Sirolimus (Rapamycin) Level, Serum: 3.4 ng/mL (08-05 @ 04:17)  Sirolimus (Rapamycin) Level, Serum: 5.0 ng/mL (08-03 @ 22:24)  Sirolimus (Rapamycin) Level, Serum: 6.1 ng/mL (08-02 @ 23:18)  Sirolimus (Rapamycin) Level, Serum: 6.7 ng/mL (08-01 @ 23:41)    CMV PCRCMVPCR Log: NotDetec Vwg82CF/mL (07-22 @ 06:18)  CMVPCR Log: NotDetec Rnp17HQ/mL (06-26 @ 18:14)  CMVPCR Log: NotDetec Kpa32WH/mL (06-25 @ 14:08)    Parvo PCRParvovirus B19 DNA by PCR: NotDetec IU/mL (07-25 @ 18:32)  Parvovirus B19 DNA by PCR: NotDetec IU/mL (06-26 @ 18:1

## 2022-08-10 NOTE — PROGRESS NOTE ADULT - ATTENDING COMMENTS
Mental status stable  Kidney not functioning due to rejection.   Check duplex to r/o DVT  f/u punch biopsy of skin  Cont vanco  Dialysis with UF only for volume removal.

## 2022-08-10 NOTE — PROGRESS NOTE ADULT - NUTRITIONAL ASSESSMENT

## 2022-08-10 NOTE — PROGRESS NOTE ADULT - SUBJECTIVE AND OBJECTIVE BOX
Follow Up:  Patient awake but somnolent. States he feels tired.     Interval History/ROS:Patient is a 66y old  Male who presents with a chief complaint of Status Epilepticus (10 Aug 2022 09:53)      REVIEW OF SYSTEMS  [ x ] All other systems negative except as noted below    Constitutional:  [ ] fever [ ] chills  [ ] weight loss  [ ]night sweat  [ ]poor appetite/PO intake [ ]fatigue   Skin:  [x] rash [ ] phlebitis	  Eyes: [ ] icterus [ ] pain  [ ] discharge	  ENMT: [ ] sore throat  [ ] thrush [ ] ulcers [ ] exudates [ ]anosmia  Respiratory: [ ] dyspnea [ ] hemoptysis [ ] cough [ ] sputum	  Cardiovascular:  [ ] chest pain [ ] palpitations [ ] edema	  Gastrointestinal:  [ ] nausea [ ] vomiting [ ] diarrhea [ ] constipation [x] pain	  Genitourinary:  [ ] dysuria [ ] frequency [ ] hematuria [ ] discharge [ ] flank pain  [ ] incontinence  Musculoskeletal:  [ ] myalgias [ ] arthralgias [ ] arthritis  [ ] back pain  Neurological:  [ ] headache [ ] weakness [ ] seizures  [ ] confusion/altered mental status    Allergies  No Known Allergies        ANTIMICROBIALS:    fluconAZOLE   Tablet 400 daily  trimethoprim   80 mG/sulfamethoxazole 400 mG 1 daily  valGANciclovir 450       OTHER MEDS: MEDICATIONS  (STANDING):  acetaminophen     Tablet .. 650 every 6 hours PRN  amLODIPine   Tablet 10 daily  doxazosin 4 <User Schedule>  epoetin cortney-epbx (RETACRIT) Injectable 23253 every 7 days  finasteride 5 daily  fosphenytoin IVPB 200 every 12 hours  heparin   Injectable 5000 every 8 hours  hydrALAZINE 25 every 8 hours  insulin glargine Injectable (LANTUS) 18 at bedtime  insulin lispro (ADMELOG) corrective regimen sliding scale  three times a day before meals  insulin lispro (ADMELOG) corrective regimen sliding scale  at bedtime  insulin lispro Injectable (ADMELOG) 3 three times a day before meals  levothyroxine 50 daily  melatonin 6 at bedtime  mycophenolate mofetil 250 <User Schedule>  pantoprazole    Tablet 40 before breakfast  polyethylene glycol 3350 17 daily  predniSONE   Tablet 20 daily  senna 2 at bedtime  sirolimus 2.5 <User Schedule>      Vital Signs Last 24 Hrs  T(F): 97.9 (08-10-22 @ 13:35), Max: 100.9 (08-08-22 @ 02:50)    Vital Signs Last 24 Hrs  HR: 64 (08-10-22 @ 14:00) (61 - 69)  BP: 165/72 (08-10-22 @ 14:00) (131/63 - 186/75)  RR: 18 (08-10-22 @ 14:00)  SpO2: 100% (08-10-22 @ 14:00) (97% - 100%)  Wt(kg): --    EXAM:  Constitutional:  well preserved, comfortable  Head/Eyes: no icterus, PERRL, EOMI  ENT:  supple; no thrush  LUNGS:  CTA +L chest wall HD catheter c/d/i  CVS:  normal S1, S2, no murmur  Abd:  soft, non-tender; non-distended +RLQ skin erythema, induration, TTP, swelling extending to R groin  Ext:  no edema  Vascular:  IV site no erythema tenderness or discharge  MSK:  joints without swelling  Neuro: AAO X 3, non- focal    Labs:                        9.1    20.62 )-----------( 364      ( 10 Aug 2022 05:57 )             27.9     08-10    130<L>  |  92<L>  |  68<H>  ----------------------------<  120<H>  4.2   |  21<L>  |  4.25<H>    Ca    8.3<L>      10 Aug 2022 05:57  Phos  4.1     08-10  Mg     2.0     08-10    TPro  5.9<L>  /  Alb  2.0<L>  /  TBili  0.2  /  DBili  0.1  /  AST  16  /  ALT  12  /  AlkPhos  259<H>  08-10      WBC Trend:  WBC Count: 20.62 (08-10-22 @ 05:57)  WBC Count: 22.22 (08-09-22 @ 05:52)  WBC Count: 19.54 (08-08-22 @ 05:42)  WBC Count: 19.38 (08-07-22 @ 06:05)      Creatine Trend:  Creatinine, Serum: 4.25 (08-10)  Creatinine, Serum: 3.70 (08-09)  Creatinine, Serum: 3.43 (08-09)  Creatinine, Serum: 3.23 (08-08)      Liver Biochemical Testing Trend:  Alanine Aminotransferase (ALT/SGPT): 12 (08-10)  Alanine Aminotransferase (ALT/SGPT): 14 (08-09)  Alanine Aminotransferase (ALT/SGPT): 15 (08-08)  Alanine Aminotransferase (ALT/SGPT): 19 (08-07)  Alanine Aminotransferase (ALT/SGPT): 21 (08-06)  Aspartate Aminotransferase (AST/SGOT): 16 (08-10-22 @ 05:57)  Aspartate Aminotransferase (AST/SGOT): 16 (08-09-22 @ 05:54)  Aspartate Aminotransferase (AST/SGOT): 18 (08-08-22 @ 05:42)  Aspartate Aminotransferase (AST/SGOT): 20 (08-07-22 @ 06:05)  Aspartate Aminotransferase (AST/SGOT): 26 (08-06-22 @ 05:37)  Bilirubin Direct, Serum: 0.1 (08-10)  Bilirubin Total, Serum: 0.2 (08-10)  Bilirubin Direct, Serum: 0.2 (08-09)  Bilirubin Total, Serum: 0.2 (08-09)  Bilirubin Direct, Serum: 0.1 (08-08)      Trend LDH  07-30-22 @ 13:02  785<H>  07-25-22 @ 06:30  655<H>  06-11-22 @ 18:47  223  05-02-22 @ 10:05  246<H>  04-30-22 @ 07:15  260<H>    MICROBIOLOGY:  Vancomycin Level, Random: 16.5 (08-10 @ 05:57)  Vancomycin Level, Random: 19.9 (08-09 @ 09:15)  Vancomycin Level, Random: 13.5 (08-08 @ 05:42)  Vancomycin Level, Random: 15.6 (08-07 @ 06:05)  Vancomycin Level, Random: 20.4 (08-06 @ 05:37)    MRSA PCR Result.: NotDetec (07-26-22 @ 03:47)  MRSA PCR Result.: NotDetec (07-13-22 @ 21:02)  MRSA PCR Result.: NotDetec (06-26-22 @ 11:25)      Culture - Tissue with Gram Stain (collected 08 Aug 2022 17:44)  Source: .Tissue Other  Preliminary Report:    No growth    Culture - Acid Fast - Tissue w/Smear (collected 08 Aug 2022 17:44)  Source: .Tissue Other    Culture - Fungal, Tissue (collected 08 Aug 2022 17:44)  Source: .Tissue Other  Preliminary Report:    Testing in progress    Culture - Blood (collected 08 Aug 2022 04:01)  Source: .Blood Blood-Peripheral  Preliminary Report:    No growth to date.    Culture - Blood (collected 08 Aug 2022 04:01)  Source: .Blood Blood-Peripheral  Preliminary Report:    Growth in aerobic bottle: Gram Positive Cocci in Clusters    ***Blood Panel PCR results on this specimen are available    approximately 3 hours after the Gram stain result.***    Gram stain, PCR, and/or culture results may not always    correspond due to difference in methodologies.    ************************************************************    This PCR assay was performed by multiplex PCR. This    Assay tests for 66 bacterial and resistance gene targets.    Please refer to the [facility] test directory    at [email or web address] for details.  Organism: Blood Culture PCR  Organism: Blood Culture PCR    Sensitivities:      -  Staphylococcus epidermidis, Methicillin resistant: Detec      Method Type: PCR    Culture - Blood (collected 03 Aug 2022 23:46)  Source: .Blood Blood  Final Report:    No Growth Final    Culture - Blood (collected 03 Aug 2022 23:46)  Source: .Blood Blood  Final Report:    No Growth Final    Culture - Blood (collected 30 Jul 2022 06:19)  Source: .Blood Blood-Peripheral  Final Report:    No Growth Final    Culture - Blood (collected 30 Jul 2022 06:07)  Source: .Blood Blood-Peripheral  Final Report:    No Growth Final    Culture - Blood (collected 27 Jul 2022 04:34)  Source: .Blood Blood-Peripheral  Final Report:    No Growth Final      HIV-1/2 Combo Result: Nonreact (04-21-22 @ 14:47)    COVID-19 PCR: NotDetec (08-07-22 @ 18:05)  COVID-19 PCR: NotDetec (07-25-22 @ 11:51)  COVID-19 PCR: NotDetec (07-23-22 @ 07:55)  COVID-19 PCR: NotDetec (07-17-22 @ 07:54)  COVID-19 PCR: NotDetec (07-10-22 @ 16:11)  COVID-19 PCR: NotDetec (07-05-22 @ 06:03)  COVID-19 PCR: NotDetec (06-23-22 @ 07:01)  COVID-19 PCR: NotDetec (06-14-22 @ 12:44)      Procalcitonin, Serum: 0.97 (08-10)  Procalcitonin, Serum: 0.95 (08-09)  Procalcitonin, Serum: 0.96 (08-08)  Procalcitonin, Serum: 1.11 (08-07)  Procalcitonin, Serum: 1.18 (08-06)  Procalcitonin, Serum: 1.31 (08-05)  Procalcitonin, Serum: 0.78 (08-03)    C-Reactive Protein, Serum: 302 (08-08)    Blood Gas Venous - Lactate: 0.8 (08-10 @ 05:45)  Blood Gas Venous - Lactate: 1.3 (08-08 @ 23:27)    RADIOLOGY:  imaging below personally reviewed  < from: [facility] Duplex Lower Extrem Arterial, Bilat (08.10.22 @ 13:09) >  uate the lower extremity arteries.    Mild atheromatous plaque is present along the course of the arteries   supplying both the right and left lower extremities.    There is a normal high resistance, triphasic pattern of unimpeded   antegrade flow through the right and left distal external iliac, common   femoral, deep femoral, superficial femoral and popliteal arteries.    There is unimpeded antegrade flow through the right and left posterior   tibial peroneal trunks, the posterior tibial, peroneal, anterior tibial   and dorsal pedis arteries.    Impression: Lower extremity arterial vascular disease is NOT identified.    < end of copied text >  < from: [facility] Duplex Lower Ext Vein Scan, Bilat (08.10.22 @ 12:59) >    RIGHT:  Normal compressibility of the RIGHT common femoral, femoral and popliteal   veins.  Doppler examination shows normal spontaneous and phasic flow.  No RIGHT calf vein thrombosis is detected.    LEFT:  Normal compressibility of the LEFT common femoral, femoral and popliteal   veins.  Doppler examination shows normal spontaneous and phasic flow.  No LEFT calf vein thrombosis is detected.    IMPRESSION:  No evidence of deep venous thrombosis in either lower extremity.    < end of copied text >

## 2022-08-10 NOTE — PROGRESS NOTE ADULT - NS ATTEND AMEND GEN_ALL_CORE FT
65 y/o male w/ a PMHx of CAD s/p CABG, AF on Eliquis c/b CVA c/b seizures, HTN, HLD, DM type II, hypothyroidism, GI bleed due to a duodenal ulcer, and ESRD s/p DDRT 4/21/22 c/b DGF requiring HD & large perinephric hematoma 5/22 s/p evacuation w/ revision of arterial anastomosis who presented in status epilepticus    Awake and alert, no further seizure on Fosphenytoin  Hemodynamically stable, off Coreg, bradycardia resolved. HTN control with Hydralazine and Amlodipine  Saturating well on RA  Good PO intake with assistance  Antirejection meds  ppx abx  Continue HD with fluid removal.  On Abx vanco for resolving cellulitis of rt flank. CT A/P did not show any collection or gas. Vanco level > 16  Lantus dose adjusted/ISS, off Insulin drip  Duplex of lower and UE neg for DVT   On pharm DVT ppx with SQ Heparin tid, h/o psoas hematoma, HCT have been stable  Son kept updated

## 2022-08-10 NOTE — PROGRESS NOTE ADULT - ASSESSMENT
66M with h/o DM type II, HTN, CAD s/p CABG in 2020, Afib on Eliquis, CVA in 2019 due to Afib, Seizure d/o (last episode was on 4/8/22), h/o GI bleed in 2/2022 EGD with duodenal ulcer and ESRD on HD s/p R DDRT from DCD donor on 4/21/22 complicated by DGF requiring HD until 4/29/22 who presented with status epilepticus in setting of UTI/antibiotics and sub-therapeutic valproic acid level. Transferred to SICU on 7/25 with signs of sepsis, now with refractory seizures    Seizure Disorder:  - refractory seizures o/n.  Neuro/Epilepsy Teams following. Follow recommendations closely. continue EEG. Avoid Fycompa 2/2 agitation in past  - prior MRI head 6/27 with less concerns for PRES, likely ischemic changes  - o/n CTH 8/3 with no acute findings  - replete lytes aggressively  -FU fosphenytoin level  - FU Free fosphenytoin level    R DCD DDRT 4/21/22 now with 2A ACR  - 2A ACR: s/p pulse steroids, now on 20mg QD will keep   - will continue HD daily per renal given persistent fluid overload and anuria  - Scrotal support  - On vanc by level for ppx (level today 16.5). Bld cx from 8/3 w/ NGTD  - graft doppler stable, stable perinephric collection last US 8/5 and CT scan 8/8  - S/P removal of ureteral stent on 7/12  - Continue Doxazosin/Proscar for BPH  - OOB with RN/PT  - DSA negative   - ADAT. 1L fluid restriction  - follow up punch biopsy of the wound: 8/8 Culture ngtd  -DVT ppx with SQH  - FU fungitel  -FU LE studies    Immunosuppression:   - Belatacept: 6/23, 7/4, 7/8. Re-loaded 7/8,  as there was a gap between doses 2/2 seizures. 7/18,  last dose 8/1  - on Sirolimus as of  8/2 per level    - Cellcept 250 BID  - continue Pred 20mg QD  PPX:  diflucan 400mg QD, bactrim, valcyte (MWF)  - PPx: Valcyte/ bactrim     DM  - steroid induced hyperglycemia, improving with Endocrine management    AFib/R IJ DVT   - Eliquis with ~40% less efficacy while on fosphenytoin.    - Lovenox held for ANA and transaminitis   - rate controlled  - will discuss A/C plans daily    HTN:  - Nifedipine/Cardura/Hydralazline  - off Coreg due to bradycardia    DISPO  - continue SICU care

## 2022-08-10 NOTE — PROGRESS NOTE ADULT - ATTENDING COMMENTS
Renal transplant recipient with prolonged admission now with RLQ Abdominal wall erythema, leukocytosis and fevers  Developed following 10 day course of Ertapenem for transplant pyelonephritis  Induration / tenderness improved with Vancomycin but continued to spread in terms of extent  s/p Dermatology skin biopsy for pathology and culture on 8/8    Concern for atypical infectious etiology  Pathology result nonspecific  Would continue Vancomycin by level in the interim  Agree with Dermatology recommendation; would see if we can pursue biopsy of abdominal wall muscle for additional culture yield    I will continue to follow. Please feel free to contact me with any further questions.    Carlton Hoover M.D.  [facility] Division of Infectious Disease  8AM-5PM Monday - Friday: Available on [employer] Teams  After Hours and Holidays (or if no response on [employer] Teams): Please contact the Infectious Diseases Office at [phone]    The above assessment and plan were discussed with Dr Daniel Tena

## 2022-08-10 NOTE — PROGRESS NOTE ADULT - ASSESSMENT
66 year old male with PMH of  DM type II, HTN, CAD s/p CABG in 2020, Afib on Eliquis, CVA in 2019 due to Afib, Seizure d/o (last episode was on 4/8/22), h/o GI bleed in 2/2022 EGD with duodenal ulcer and ESRD on HD s/p R DDRT from DCD donor on 4/21/22 complicated by DGF requiring HD until 4/29/22, later had good graft function who was readmitted with status epilepticus in setting of UTI/antibiotics and sub-therapeutic valproic acid level.  Transferred to SICU on 7/25 with signs of sepsis. 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 Received pulse steroids (Solumedrol 500 on 7/30 -> 250 on 7/31).     1. 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 No  DSA.   s/p pulse dose steroids. Thymo was not given to treat the rejection as he is too frail and at increased risk for infections. Now with failed allograft function, Remains anuric and is dialysis dependent.   Pt underwent IR guided HD catheter exchange on 8/8/22 Last HD done on 8/9 with UF of 2.5L. Pt with volume overload and anuric. Plan for HD with goal of 2.5 kgs. Monitor for labs and urineoutput. Bladder rya prn.      2. IS meds- was on Belatacept. now on Sirolimus ,  mg po bid and steroid taper.     3. AMS , seizures - had 3 episodes of seizures on 8/3. CT head on 8/3 was negative. On Fosphenytoin. No further episodes of seizures since 8/3. Neurology following.    4. DM -  on Lantus and lispro.     5. Cellulitis over RLQ - WBC increased to 22K today. On Vancomycin . CT A/P on 8/6 revealed a retroperitoneal hematoma. Txp USG 8/6  - Patent renal artery vasculature. Stable appearance the transplant kidney .Persistent elevated resistive indices  Diffuse abdominal wall edematous changes right lower quadrant without focal collection, cultures from 8/3  negative so far. Skin bx done on 8/8/22 pending results. ID follow up. Follow Fungitell,. Continue Fluconazole. Monitor fevers and labs.    6.  ESBL klebsiella UTI (7/15 + UCx, 7/25 +UCx)- completed a course of  Ertapenam on  8/3 + Fluconazole.    7. LE edema: Pt with B/L LE/UE swelling R>L, pending doppler studies of UE and LE to rule out DVT. Start Subq Heparin. Cannot use Eliquis given interaction with Dilantin.

## 2022-08-10 NOTE — H&P ADULT - NSHPRISKHIVSCREEN_GEN_ALL_CORE
Offered and patient declined Gabapentin Counseling: I discussed with the patient the risks of gabapentin including but not limited to dizziness, somnolence, fatigue and ataxia.

## 2022-08-10 NOTE — PROGRESS NOTE ADULT - ASSESSMENT
WORK UP  s/p LP which was not suggestive of infectious process    U/A (7/12) 161 WBC  UCx (7/13) 50-99K ESBL Klebsiella   s/p 3 days of Ertapenem    UA (7/25) with 40 WBC  UCx (7/25) 50-99k ESBL Klebsiella     RUQ (7/25) with hepatomegaly   CT c/a/p (7/25) with only small perinephric fluid collection, small volume ascites.  Doppler US R Kidney (7/27) with mild thickening of collecting system and ureter and small   Doppler US R Kidney (7/29) with fullness of collecting system and continued urothelial thickening.     RVP (7/25) Negative  CMV PCR (7/22) Negative  EBV PCR (7/25) Negative   HBV PCR (7/25) Negative   HHV-6 PCR (7/25) POSITIVE   Parvovirus IgM and PCR (7/26) Negative   HSV 1/2 PCR (7/26) Negative   Adenovirus PCR (7/26) Negative    CT A/P (8/5) with No drainable fluid collections and Right psoas retroperitoneal hematoma.    DIAGNOSIS and IMPRESSION  66M with ESRD s/p PermCath removal 5/10/22 s/p Rt DDRT 4/21/22,  CVA (2019), Afib on apixaban, seizure activity, CAD s/p stents, CABG (2020), HTN, HLD, DM on insulin, gastric/duodenal ulcer, recent hospitalization 4/28/22 -5/10/22 with weakness/anemia found to have perinephric hematoma requiring evacuation and repair of bleeding arterial anastomosis admitted 6/10 for status epilepticus requiring intubation for hypoxic respiratory failure.     #Rash, Erythema overlying Renal Transplant  #Leukocytosis   #ESRD s/p DDRT (4/21/2022)  #Status epilepticus     RECOMMENDATIONS  - afebrile, no seizures. leukocytosis stable  - BCx 8/8 with CoNS 1 out of 4 --- likely contaminant   - AFB Cx 8/8 negative, Tissue Cx 8/8 negative prelim  - c/w Vancomycin by level, last trough 8/10 therapeutic   - c/w Valgancyclovir and Bactrim for ppx  - Derm following; f/up final Tissue culture (Fungal/AFB/Bacterial) and pathology, plans for tissue sample PCR infectious testing   - f/up repeat BCx       PT TO BE SEEN. PRELIM NOTE  PENDING RECS. PLEASE WAIT FOR FINAL RECS AFTER DISCUSSION WITH ATTENDING#    Rogers Horton DO, PGY-4   ID fellow  [employer] Teams Preferred  After 5pm/weekends call [phone]   WORK UP  s/p LP which was not suggestive of infectious process    U/A (7/12) 161 WBC  UCx (7/13) 50-99K ESBL Klebsiella   s/p 3 days of Ertapenem    UA (7/25) with 40 WBC  UCx (7/25) 50-99k ESBL Klebsiella     RUQ (7/25) with hepatomegaly   CT c/a/p (7/25) with only small perinephric fluid collection, small volume ascites.  Doppler US R Kidney (7/27) with mild thickening of collecting system and ureter and small   Doppler US R Kidney (7/29) with fullness of collecting system and continued urothelial thickening.     RVP (7/25) Negative  CMV PCR (7/22) Negative  EBV PCR (7/25) Negative   HBV PCR (7/25) Negative   HHV-6 PCR (7/25) POSITIVE   Parvovirus IgM and PCR (7/26) Negative   HSV 1/2 PCR (7/26) Negative   Adenovirus PCR (7/26) Negative    CT A/P (8/5) with No drainable fluid collections and Right psoas retroperitoneal hematoma.    DIAGNOSIS and IMPRESSION  66M with ESRD s/p PermCath removal 5/10/22 s/p Rt DDRT 4/21/22,  CVA (2019), Afib on apixaban, seizure activity, CAD s/p stents, CABG (2020), HTN, HLD, DM on insulin, gastric/duodenal ulcer, recent hospitalization 4/28/22 -5/10/22 with weakness/anemia found to have perinephric hematoma requiring evacuation and repair of bleeding arterial anastomosis admitted 6/10 for status epilepticus requiring intubation for hypoxic respiratory failure.     #Rash, Erythema overlying Renal Transplant  #Leukocytosis   #ESRD s/p DDRT (4/21/2022)  #Status epilepticus     RECOMMENDATIONS  - afebrile, no seizures. leukocytosis stable  - BCx 8/8 with CoNS 1 out of 4 --- likely contaminant   - AFB Cx 8/8 negative, Tissue Cx 8/8 negative prelim  - Surgical Path 8/8 nonspecific findings including edema, telangiectasia and sparse mixed infiltrate of lymphocytes and neutrophils. Findings are nonspecific but can be seen in Cellulitis processes per Derm  - c/w Vancomycin by level, last trough 8/10 therapeutic   - c/w Valgancyclovir and Bactrim for ppx  - Agree with Derm, may benefit from IR Biopsy of underlying muscle for higher yield   - Derm recs appreciated   - f/up repeat BCx       D/w Attending    Rogers Horton DO, PGY-4   ID fellow  [employer] Teams Preferred  After 5pm/weekends call [phone]   WORK UP  s/p LP which was not suggestive of infectious process    U/A (7/12) 161 WBC  UCx (7/13) 50-99K ESBL Klebsiella   s/p 3 days of Ertapenem    UA (7/25) with 40 WBC  UCx (7/25) 50-99k ESBL Klebsiella     RUQ (7/25) with hepatomegaly   CT c/a/p (7/25) with only small perinephric fluid collection, small volume ascites.  Doppler US R Kidney (7/27) with mild thickening of collecting system and ureter and small   Doppler US R Kidney (7/29) with fullness of collecting system and continued urothelial thickening.     RVP (7/25) Negative  CMV PCR (7/22) Negative  EBV PCR (7/25) Negative   HBV PCR (7/25) Negative   HHV-6 PCR (7/25) POSITIVE   Parvovirus IgM and PCR (7/26) Negative   HSV 1/2 PCR (7/26) Negative   Adenovirus PCR (7/26) Negative    CT A/P (8/5) with No drainable fluid collections and Right psoas retroperitoneal hematoma.    DIAGNOSIS and IMPRESSION  66M with ESRD s/p PermCath removal 5/10/22 s/p Rt DDRT 4/21/22,  CVA (2019), Afib on apixaban, seizure activity, CAD s/p stents, CABG (2020), HTN, HLD, DM on insulin, gastric/duodenal ulcer, recent hospitalization 4/28/22 -5/10/22 with weakness/anemia found to have perinephric hematoma requiring evacuation and repair of bleeding arterial anastomosis admitted 6/10 for status epilepticus requiring intubation for hypoxic respiratory failure.     #Rash, Erythema overlying Renal Transplant  #Leukocytosis   #ESRD s/p DDRT (4/21/2022)  #Status epilepticus     RECOMMENDATIONS  - afebrile, no seizures. leukocytosis stable  - BCx 8/8 with CoNS 1 out of 4 --- likely contaminant   - AFB Cx 8/8 negative, Tissue Cx 8/8 negative prelim  - Surgical Path 8/8 nonspecific findings including edema, telangiectasia and sparse mixed infiltrate of lymphocytes and neutrophils. Findings are nonspecific but can be seen in Cellulitis processes per Derm  - c/w Vancomycin by level, last trough 8/10 therapeutic   - c/w Valgancyclovir and Bactrim for ppx  - Agree with Derm, may benefit from IR Biopsy of underlying muscle for higher yield   - Derm recs appreciated   - f/up repeat BCx       D/w Attending    Rogers Horton DO, PGY-4   ID fellow  [employer] Teams Preferred  After 5pm/weekends call [phone]

## 2022-08-10 NOTE — PROGRESS NOTE ADULT - SUBJECTIVE AND OBJECTIVE BOX
DIABETES FOLLOW UP NOTE: Saw pt earlier today    Chief Complaint: Endocrine consult requested for management of T2DM    INTERVAL HX: Saw this am in SICU, more alrt today answering simple questions. Denies any pain/sob at time of visit bit feels tired and weak. Per staff, pt eating well. BG levels high 100s to 200s yesterday because premeal insulin was not added as recommended. Noted FBG in low 100s today while on present basal insulin doses but ICU team increased Lantus dose for tonigth to 18 units. No hypoglycemia. Pt now back on HD due to failed allograft.        Review of Systems:  General: As above  Cardiovascular: No chest pain, palpitations  Respiratory: No SOB, no cough  GI: No nausea, vomiting, abdominal pain  Endocrine: No S&Sx of hypoglycemia    Allergies    No Known Allergies    Intolerances      MEDICATIONS:  finasteride 5 milliGRAM(s) Oral daily  fluconAZOLE   Tablet 400 milliGRAM(s) Oral daily  insulin glargine Injectable (LANTUS) 18 Unit(s) SubCutaneous at bedtime  insulin lispro (ADMELOG) corrective regimen sliding scale   SubCutaneous three times a day before meals  insulin lispro (ADMELOG) corrective regimen sliding scale   SubCutaneous at bedtime  insulin lispro Injectable (ADMELOG) 3 Unit(s) SubCutaneous three times a day before meals  levothyroxine 50 MICROGram(s) Oral daily  predniSONE   Tablet 20 milliGRAM(s) Oral daily  sirolimus 2.5 milliGRAM(s) Oral <User Schedule>  trimethoprim   80 mG/sulfamethoxazole 400 mG 1 Tablet(s) Oral daily  valGANciclovir 450 milliGRAM(s) Oral <User Schedule>      PHYSICAL EXAM:  VITALS: T(C): 36.6 (08-10-22 @ 13:35)  T(F): 97.9 (08-10-22 @ 13:35), Max: 99.7 (08-10-22 @ 03:00)  HR: 64 (08-10-22 @ 14:00) (61 - 69)  BP: 165/72 (08-10-22 @ 14:00) (131/63 - 186/75)  RR:  (13 - 20)  SpO2:  (97% - 100%)  Wt(kg): --  GENERAL: Male laying in bed in NAD  Abdomen: Soft, slightly tender, non distended  Extremities: Warm, + edema in all 4 exts. LE>UE.   NEURO: Alert and able to answer to simple questions    LABS:  POCT Blood Glucose.: 158 mg/dL (08-10-22 @ 11:25)  POCT Blood Glucose.: 115 mg/dL (08-10-22 @ 07:30)  POCT Blood Glucose.: 194 mg/dL (08-09-22 @ 21:49)  POCT Blood Glucose.: 229 mg/dL (08-09-22 @ 17:05)  POCT Blood Glucose.: 193 mg/dL (08-09-22 @ 12:02)  POCT Blood Glucose.: 195 mg/dL (08-09-22 @ 11:08)  POCT Blood Glucose.: 159 mg/dL (08-09-22 @ 10:05)  POCT Blood Glucose.: 137 mg/dL (08-09-22 @ 09:01)  POCT Blood Glucose.: 132 mg/dL (08-09-22 @ 08:24)  POCT Blood Glucose.: 138 mg/dL (08-09-22 @ 07:12)  POCT Blood Glucose.: 143 mg/dL (08-09-22 @ 05:59)  POCT Blood Glucose.: 127 mg/dL (08-09-22 @ 04:59)  POCT Blood Glucose.: 106 mg/dL (08-09-22 @ 03:57)  POCT Blood Glucose.: 118 mg/dL (08-09-22 @ 03:04)  POCT Blood Glucose.: 130 mg/dL (08-09-22 @ 02:02)  POCT Blood Glucose.: 138 mg/dL (08-09-22 @ 00:59)  POCT Blood Glucose.: 150 mg/dL (08-08-22 @ 23:58)  POCT Blood Glucose.: 137 mg/dL (08-08-22 @ 23:05)  POCT Blood Glucose.: 136 mg/dL (08-08-22 @ 21:57)  POCT Blood Glucose.: 125 mg/dL (08-08-22 @ 20:59)  POCT Blood Glucose.: 154 mg/dL (08-08-22 @ 20:02)  POCT Blood Glucose.: 205 mg/dL (08-08-22 @ 18:49)  POCT Blood Glucose.: 262 mg/dL (08-08-22 @ 18:09)  POCT Blood Glucose.: 259 mg/dL (08-08-22 @ 17:28)  POCT Blood Glucose.: 269 mg/dL (08-08-22 @ 13:54)  POCT Blood Glucose.: 145 mg/dL (08-08-22 @ 08:17)  POCT Blood Glucose.: 151 mg/dL (08-08-22 @ 05:32)  POCT Blood Glucose.: 230 mg/dL (08-07-22 @ 22:19)  POCT Blood Glucose.: 279 mg/dL (08-07-22 @ 16:46)                            9.1    20.62 )-----------( 364      ( 10 Aug 2022 05:57 )             27.9       08-10    130<L>  |  92<L>  |  68<H>  ----------------------------<  120<H>  4.2   |  21<L>  |  4.25<H>    eGFR: 15<L>    Ca    8.3<L>      08-10  Mg     2.0     08-10  Phos  4.1     08-10    TPro  5.9<L>  /  Alb  2.0<L>  /  TBili  0.2  /  DBili  0.1  /  AST  16  /  ALT  12  /  AlkPhos  259<H>  08-10      A1C with Estimated Average Glucose Result: 6.0 % (06-30-22 @ 05:49)      Estimated Average Glucose: 126 mg/dL (06-30-22 @ 05:49)      07-27 Chol -- Direct LDL -- LDL calculated -- HDL -- Trig 118

## 2022-08-10 NOTE — PROGRESS NOTE ADULT - NSPROGADDITIONALINFOA_GEN_ALL_CORE
-Plan discussed with pt/team.  Contact info: [phone] (24/7). pager [phone]  [email or web address] password [email or web address]  Spent 28 minutes assessing pt/labs/meds and discussing plan of care with primary team  Adjusting insulin  Discharge plan  Follow up care

## 2022-08-10 NOTE — PROGRESS NOTE ADULT - NUTRITIONAL ASSESSMENT
Diet, Soft and Bite Sized:   Consistent Carbohydrate {Evening Snack} (CSTCHOSN)  1000mL Fluid Restriction ([id number])  For patients receiving Renal Replacement - No Protein Restr, No Conc K, No Conc Phos, Low Sodium (RENAL)  Supplement Feeding Modality:  Oral  Nepro Cans or Servings Per Day:  3       Frequency:  Three Times a day (08-10-22 @ 11:07) [Active]      Please see RD assessment and/or follow up.  Managed by primary team as well

## 2022-08-10 NOTE — PROGRESS NOTE ADULT - ASSESSMENT
66 yr old Male with PMHx ESRD s/p permacath removal 5/10/22 s/p Rt DDRT 4/21/22,  CVA (2019), Afib on apixaban, seizure activity, CAD s/p stents, CABG (2020), HTN, HLD, DM on insulin, gastric/duodenal ulcer, recent hospitalization 4/28/22 -5/10/22 with weakness/anemia found to have perinephric hematoma requiring evacuation and repair of bleeding arterial anastomosis. Curently being treated for UTI with Levaquin Today after developing multiple sz episodes refractory to 5 mg versed IM (EMS) and 2 mg ativan IV in E.D. concerning for status epilepticus requiring intubation for hypoxic respiratory  failure. MICU Consult was called for hypoxic respiratory failure secondary to status epilepticus.  Nephrology consulted for renal failure.     A/P  DDRT on 04/21/22  Course complicated by DGF, was on HD until 4/29/22. Readmitted in May for anemia in the setting of large perinephric hematoma s/p evacuation and repair of arterial anastomosis on 5/02/22. Intra operative biopsy showed no rejection.  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   Now with failed allograft function, Remains anuric and is dialysis dependent.   continue Immunosuppression per transplant team  plan for HD today - RRT per transplant team    transplant team on board   monitor BMP, U/O     HTN   elevated   plan for HD with UF today   BP manage by transplant team    monitor BP closely

## 2022-08-10 NOTE — PROGRESS NOTE ADULT - ASSESSMENT
Patient is a 65 y/o male w/ a PMHx of CAD s/p CABG, AF on Eliquis c/b CVA c/b seizures, HTN, HLD, DM type II, hypothyroidism, GI bleed due to a duodenal ulcer, and ESRD s/p DDRT 4/21/22 c/b DGF requiring HD & large perinephric hematoma 5/22 s/p evacuation w/ revision of arterial anastomosis who presented in status epilepticus 6/10 from rehab requiring intubation for airway protection with a hospital course c/b large right pleural effusion s/p thoracentesis c/b hemothorax while being anticoagulated & requiring intubation for airway protection s/p chest tube placement w/ CT demonstrating retained hemothorax s/p right VATS, status epilepticus again causing AMS requiring intubation for airway protection again, delirium, dysphagia requiring NGT placement for enteral access, hyperkalemia, and poor glycemic control. 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     Neuro: rencephalopathy improved, hx of seizure, with refractory seizure  - c/w Fosphenytoin 200mg IV BID, following levels  - Multimodal pain control w/Tylenol  - Melatonin with Protected sleep to improve delirium  - Neurology following, recs appreciated     Resp: prevent atelectasis  - Maintain SpO2 >92%   - Out of bed to chair, ambulate as tolerated, and incentive spirometry to prevent atelectasis    CVS: hx of HTN, s/p CABG, AF on Eliquis  - Maintain MAP > 65  - Continue Hydralazine 25mg q8h and Amlodipine 10mg qd   -carvedilol on hold 2/2 bradycardia  - lactate cleared    GI: transaminitis improved  - passed swallow, non soft and bite sized diet  - Protonix 40mg IV daily for ppx   - Bowel regimen with Miralax,  senna     Renal: DDRT 4/21 cb DGF, now c/b acute rejection s/p IR renal bx 7/29  - HD during the day removed 2.5L  - Doxazosin + Proscar  - Continue immunosuppression with sirolimus 2.5mg bid , cellcept 250mg bid prednisone 20mg     Heme:  - Monitor CBC and coags  - SCDs for mechanical VTE ppx   - Subq Heparin  - DVT studies ordered     ID: ESBL klebsiella UTI (7/15 + UCx, 7/25 +UCx)  -c/w Vanco by level due to leukocytosis with surrounding erythema of Right groin incision per Transplant ID.   - One blood culture bottle grew Staph epidermidis MR. Follow up ID recs. Repeat blood cultures sent  - Will follow cellulitis biopsy tissue results.   - Bactrim and Valcyte for immuno ppx      Endo: hx of T2DM, Hypothyroidism,   - Now on Lantus 12u. ISS.  - Continue Home Synthroid 37.5mg ivp    Code status: Full Code   Dispo: SICU

## 2022-08-10 NOTE — PROGRESS NOTE ADULT - SUBJECTIVE AND OBJECTIVE BOX
[facility]   MD NINA MASON MD, DO SADIE RAMIREZ NP    TEL:  OFFICE: [phone]    From 5pm-7am Answering Service [phone]    -- RENAL FOLLOW UP NOTE ---Date of Service 08-10-22 @ 15:48    Patient is a 66y old  Male who presents with a chief complaint of Status Epilepticus (10 Aug 2022 14:51)      Patient seen and examined in ICU.     VITALS:  T(F): 98.1 (08-10-22 @ 15:00), Max: 99.7 (08-10-22 @ 03:00)  HR: 64 (08-10-22 @ 15:00)  BP: 176/69 (08-10-22 @ 15:00)  RR: 14 (08-10-22 @ 15:00)  SpO2: 99% (08-10-22 @ 15:00)  Wt(kg): --    08-09 @ 07:01  -  08-10 @ 07:00  --------------------------------------------------------  IN: 1406.5 mL / OUT: 3000 mL / NET: -1593.5 mL    08-10 @ 07:01  -  08-10 @ 15:48  --------------------------------------------------------  IN: 450 mL / OUT: 0 mL / NET: 450 mL          PHYSICAL EXAM:  Constitutional: NAD  Neck: No JVD  Respiratory: CTAB, no wheezes, rales or rhonchi  Cardiovascular: S1, S2, RRR  Gastrointestinal: BS+, soft, NT/ND  Extremities: + peripheral edema    Hospital Medications:   MEDICATIONS  (STANDING):  amLODIPine   Tablet 10 milliGRAM(s) Oral daily  chlorhexidine 2% Cloths 1 Application(s) Topical <User Schedule>  doxazosin 4 milliGRAM(s) Oral <User Schedule>  epoetin cortney-epbx (RETACRIT) Injectable 25134 Unit(s) SubCutaneous every 7 days  finasteride 5 milliGRAM(s) Oral daily  fluconAZOLE   Tablet 400 milliGRAM(s) Oral daily  fosphenytoin IVPB 200 milliGRAM(s) PE IV Intermittent every 12 hours  heparin   Injectable 5000 Unit(s) SubCutaneous every 8 hours  hydrALAZINE 25 milliGRAM(s) Oral every 8 hours  insulin glargine Injectable (LANTUS) 18 Unit(s) SubCutaneous at bedtime  insulin lispro (ADMELOG) corrective regimen sliding scale   SubCutaneous three times a day before meals  insulin lispro (ADMELOG) corrective regimen sliding scale   SubCutaneous at bedtime  insulin lispro Injectable (ADMELOG) 3 Unit(s) SubCutaneous three times a day before meals  levothyroxine 50 MICROGram(s) Oral daily  melatonin 6 milliGRAM(s) Oral at bedtime  mycophenolate mofetil 250 milliGRAM(s) Oral <User Schedule>  pantoprazole    Tablet 40 milliGRAM(s) Oral before breakfast  petrolatum white Ointment 1 Application(s) Topical two times a day  polyethylene glycol 3350 17 Gram(s) Oral daily  predniSONE   Tablet 20 milliGRAM(s) Oral daily  senna 2 Tablet(s) Oral at bedtime  sirolimus 2.5 milliGRAM(s) Oral <User Schedule>  trimethoprim   80 mG/sulfamethoxazole 400 mG 1 Tablet(s) Oral daily  valGANciclovir 450 milliGRAM(s) Oral <User Schedule>      LABS:  08-10    130<L>  |  92<L>  |  68<H>  ----------------------------<  120<H>  4.2   |  21<L>  |  4.25<H>    Ca    8.3<L>      10 Aug 2022 05:57  Phos  4.1     08-10  Mg     2.0     08-10    TPro  5.9<L>  /  Alb  2.0<L>  /  TBili  0.2  /  DBili  0.1  /  AST  16  /  ALT  12  /  AlkPhos  259<H>  08-10    Creatinine Trend: 4.25 <--, 3.70 <--, 3.43 <--, 3.23 <--, 4.70 <--, 4.58 <--, 3.98 <--, 3.68 <--, 3.91 <--, 3.29 <--, 4.17 <--, 3.97 <--, 3.78 <--, 3.37 <--    Albumin, Serum: 2.0 g/dL (08-10 @ 05:57)  Phosphorus Level, Serum: 4.1 mg/dL (08-10 @ 05:57)  Phosphorus Level, Serum: 4.2 mg/dL (08-09 @ 17:58)                              9.1    20.62 )-----------( 364      ( 10 Aug 2022 05:57 )             27.9     Urine Studies:  Urinalysis - [07-25-22 @ 18:28]      Color Yellow / Appearance Slightly Turbid / SG 1.020 / pH 5.5      Gluc Negative / Ketone Negative  / Bili Negative / Urobili Negative       Blood Small / Protein 100 / Leuk Est Large / Nitrite Negative      RBC 2 / WBC 40 / Hyaline 5 / Gran  / Sq Epi  / Non Sq Epi 0 / Bacteria Many      Iron 17, TIBC 171, %sat 10      [05-02-22 @ 13:03]  Ferritin 6336      [07-27-22 @ 13:00]  PTH -- (Ca 9.2)      [02-05-22 @ 10:13]   294  HbA1c 6.0      [02-21-20 @ 08:53]  TSH 4.69      [07-06-22 @ 18:36]  Lipid: chol --, , HDL --, LDL --      [07-27-22 @ 13:00]    HBsAg Nonreact      [07-25-22 @ 11:51]  HCV 0.18, Nonreact      [07-25-22 @ 11:51]      RADIOLOGY & ADDITIONAL STUDIES:

## 2022-08-10 NOTE — CHART NOTE - NSCHARTNOTEFT_GEN_A_CORE
Nutrition Follow Up Note  Patient seen for: malnutrition follow up s/p transfer to SICU      Chart reviewed, events noted. "65 y/o male w/ a PMHx of CAD s/p CABG, AF on Eliquis c/b CVA c/b seizures, HTN, HLD, DM type II, hypothyroidism, GI bleed due to a duodenal ulcer, and ESRD s/p DDRT 22 c/b DGF requiring HD & large perinephric hematoma  s/p evacuation w/ revision of arterial anastomosis who presented in status epilepticus 6/10 from rehab requiring intubation for airway protection with a hospital course c/b large right pleural effusion s/p thoracentesis c/b hemothorax while being anticoagulated & requiring intubation for airway protection s/p chest tube placement w/ CT demonstrating retained hemothorax s/p right VATS, status epilepticus again causing AMS requiring intubation for airway protection again, delirium, dysphagia requiring NGT placement for enteral access, hyperkalemia, and poor glycemic control. Pt transferred to floor 7/10 and readmitted to SICU  for leukopenia, transaminitis, and ANA, found to have renal rejection and refractory seizure."    Source: [] Patient       [x] EMR        [] RN        [] Family at bedside       [] Other:    -If unable to interview patient: [] Trach/Vent/BiPAP  [] Disoriented/confused/inappropriate to interview    Diet Order: Diet, Soft and Bite Sized:   Consistent Carbohydrate {Evening Snack} (CSTCHOSN)  1000mL Fluid Restriction ([id number])  For patients receiving Renal Replacement - No Protein Restr, No Conc K, No Conc Phos, Low Sodium (RENAL)  Supplement Feeding Modality:  Oral  Nepro Cans or Servings Per Day:  3       Frequency:  Three Times a day (08-10-22 @ 11:07) [Active]    - Is current order appropriate/adequate? [X] Yes:     PO intake :   [] >75%  Adequate    [X] 50-75%  Fair       [] <50%  Poor  Good po intake with feeding assistance; pt continues to drink Nepro suppements.    Nutrition-related concerns:  - R DCD DDR 22. "Allograft function initially with DGF which later improved but now with Grade 2a rejection (biopsy on )"  - HD restarted . Last HD:  (-2L),  (-2.5L). Pt  with volume overload and anuric, per chart. HD ordered 8/10.  - 1L fluid restriction added 8/10, in setting of hyponatremia (Na 130)  - Endocrine following for hyperglycemia on prednisone (HbA1c 6%). Insulin drip discontinued; transitioned to Lantus (18 units bedtime), Admelog (3 units tid before meals), and ISS (before meals/bedtime)    GI:    Last BM: , , 8/10.   Bowel Regimen? [X] Yes: Miralax, senna    Weights:   Daily Weight in k.7 (08-10), 71.9 (), 74.4 (), 77.5 (), 66.7 (), 68.7 (), 82.6 (), 75.5 (), 70.1 (), 70.4 (), 69.6 (), 68 (-15), 72.3 ()  Weight fluctuations noted in setting of volume overload, edema and HD.  DOSING Weight: 61.7 kg  IBW: 75.3 kg    Nutritionally Pertinent MEDICATIONS  (STANDING):  amLODIPine   Tablet  doxazosin  finasteride  fluconAZOLE   Tablet  hydrALAZINE  insulin glargine Injectable (LANTUS)  insulin lispro (ADMELOG) corrective regimen sliding scale  insulin lispro (ADMELOG) corrective regimen sliding scale  insulin lispro Injectable (ADMELOG)  levothyroxine  pantoprazole    Tablet  polyethylene glycol 3350  predniSONE   Tablet  senna  trimethoprim   80 mG/sulfamethoxazole 400 mG  valGANciclovir    Pertinent Labs: 08-10 @ 05:57: Na 130<L>, BUN 68<H>, Cr 4.25<H>, <H>, K+ 4.2, Phos 4.1, Mg 2.0, Alk Phos 259<H>, ALT/SGPT 12, AST/SGOT 16,    @ 17:58: Na 130<L>, BUN 58<H>, Cr 3.70<H>, <H>, K+ 4.3, Phos 4.2, Mg 2.1,     A1C with Estimated Average Glucose Result: 6.0 % (22 @ 05:49)  A1C with Estimated Average Glucose Result: 6.6 % (22 @ 17:12)    Finger Sticks:  POCT Blood Glucose.: 158 mg/dL (08-10 @ 11:25)  POCT Blood Glucose.: 115 mg/dL (08-10 @ 07:30)  POCT Blood Glucose.: 194 mg/dL ( @ 21:49)  POCT Blood Glucose.: 229 mg/dL ( @ 17:05)    Skin per nursing documentation: sacrum stage II  Edema: 3+ dependent, left/right arm, 4+ scrotum    Estimated Nutrition Needs: based on dosing wt 61.7 kg   Energy Needs: 1543 - 1851 kcal (25-30 kcal/kg)  Protein Needs: 74 - 99 grams (1.2-1.6 g/kg)  Fluid Needs: defer to team in setting of ANA      Previous Nutrition Diagnosis: 1) Severe Malnutrition 2) Increased Nutrient Needs  Nutrition Diagnosis is: [X] ongoing; addressed with therapeutic diet and oral supplements    New Nutrition Diagnosis: [X] Not applicable    Nutrition Care Plan:  [X] In Progress  [] Achieved  [] Not applicable    Nutrition Interventions:     Education Provided:       [] Yes:  [X] No: N/A       Recommendations:      1) Continue diet as above. Defer diet texture/consistency to team. Defer fluid restriction to team.   2) Continue 3 servings Nepro supplement  3) Reinforce post-transplant nutrition therapy and food safety guidelines in-house and prior to discharge, as appropriate.  4) Discharge diet: Continue as above. Recommend follow up visit with Transplant MD and outpatient RD for dietary modifications as warranted.     Monitoring and Evaluation:   Continue to monitor nutritional intake, tolerance to diet prescription, weights, labs, skin integrity      RD remains available upon request and will follow up per protocol  Wendie Lazaro, MS RD CDN [facility] (pager [phone]) Nutrition Follow Up Note  Patient seen for: malnutrition follow up s/p transfer to SICU      Chart reviewed, events noted. "67 y/o male w/ a PMHx of CAD s/p CABG, AF on Eliquis c/b CVA c/b seizures, HTN, HLD, DM type II, hypothyroidism, GI bleed due to a duodenal ulcer, and ESRD s/p DDRT 22 c/b DGF requiring HD & large perinephric hematoma  s/p evacuation w/ revision of arterial anastomosis who presented in status epilepticus 6/10 from rehab requiring intubation for airway protection with a hospital course c/b large right pleural effusion s/p thoracentesis c/b hemothorax while being anticoagulated & requiring intubation for airway protection s/p chest tube placement w/ CT demonstrating retained hemothorax s/p right VATS, status epilepticus again causing AMS requiring intubation for airway protection again, delirium, dysphagia requiring NGT placement for enteral access, hyperkalemia, and poor glycemic control. Pt transferred to floor 7/10 and readmitted to SICU  for leukopenia, transaminitis, and ANA, found to have renal rejection and refractory seizure."    Source: [] Patient       [x] EMR        [] RN        [] Family at bedside       [X] Other: Pt asleep with bedside HD at time of visit; no family at bedside      Diet Order: Diet, Soft and Bite Sized:   Consistent Carbohydrate {Evening Snack} (CSTCHOSN)  1000mL Fluid Restriction ([id number])  For patients receiving Renal Replacement - No Protein Restr, No Conc K, No Conc Phos, Low Sodium (RENAL)  Supplement Feeding Modality:  Oral  Nepro Cans or Servings Per Day:  3       Frequency:  Three Times a day (08-10-22 @ 11:07) [Active]    - Is current order appropriate/adequate? [X] Yes:     PO intake :   [] >75%  Adequate    [X] 50-75%  Fair       [] <50%  Poor  Per flowhsheet and fletcher, good po intake with feeding assistance; pt continues to drink Nepro supplements.    Nutrition-related concerns:  - R DCD DDR 22. "Allograft function initially with DGF which later improved but now with Grade 2a rejection (biopsy on )"  - HD restarted . Last HD:  (-2L),  (-2.5L). Pt with volume overload and anuric. HD ordered 8/10.  - 1L fluid restriction added 8/10, in setting of hyponatremia (Na 130)  - Endocrine following for hyperglycemia on prednisone (HbA1c 6%). Insulin drip discontinued; transitioned to Lantus (18 units bedtime), Admelog (3 units tid before meals), and ISS (before meals/bedtime)    GI:    Last BM: , , 8/10.   Bowel Regimen? [X] Yes: Miralax, senna    Weights:   Daily Weight in k.7 (08-10), 71.9 (), 74.4 (), 77.5 (), 66.7 (), 68.7 (), 82.6 (), 75.5 (), 70.1 (), 70.4 (), 69.6 (), 68 (07-15), 72.3 ()  Weight fluctuations noted in setting of volume overload, edema and HD.  DOSING Weight: 61.7 kg  IBW: 75.3 kg    Nutritionally Pertinent MEDICATIONS  (STANDING):  amLODIPine   Tablet  doxazosin  finasteride  fluconAZOLE   Tablet  hydrALAZINE  insulin glargine Injectable (LANTUS)  insulin lispro (ADMELOG) corrective regimen sliding scale  insulin lispro (ADMELOG) corrective regimen sliding scale  insulin lispro Injectable (ADMELOG)  levothyroxine  pantoprazole    Tablet  polyethylene glycol 3350  predniSONE   Tablet  senna  trimethoprim   80 mG/sulfamethoxazole 400 mG  valGANciclovir    Pertinent Labs: 08-10 @ 05:57: Na 130<L>, BUN 68<H>, Cr 4.25<H>, <H>, K+ 4.2, Phos 4.1, Mg 2.0, Alk Phos 259<H>, ALT/SGPT 12, AST/SGOT 16,    @ 17:58: Na 130<L>, BUN 58<H>, Cr 3.70<H>, <H>, K+ 4.3, Phos 4.2, Mg 2.1,     A1C with Estimated Average Glucose Result: 6.0 % (22 @ 05:49)  A1C with Estimated Average Glucose Result: 6.6 % (22 @ 17:12)    Finger Sticks:  POCT Blood Glucose.: 158 mg/dL (08-10 @ 11:25)  POCT Blood Glucose.: 115 mg/dL (08-10 @ 07:30)  POCT Blood Glucose.: 194 mg/dL ( @ 21:49)  POCT Blood Glucose.: 229 mg/dL ( @ 17:05)    Skin per nursing documentation: sacrum stage II  Edema: 3+ dependent, left/right arm, 4+ scrotum    Estimated Nutrition Needs: based on dosing wt 61.7 kg   Energy Needs: 1543 - 1851 kcal (25-30 kcal/kg)  Protein Needs: 74 - 99 grams (1.2-1.6 g/kg)  Fluid Needs: defer to team in setting of ANA      Previous Nutrition Diagnosis: 1) Severe Malnutrition 2) Increased Nutrient Needs  Nutrition Diagnosis is: [X] ongoing; addressed with therapeutic diet and oral supplements    New Nutrition Diagnosis: [X] Not applicable    Nutrition Care Plan:  [X] In Progress  [] Achieved  [] Not applicable    Nutrition Interventions:     Education Provided:       [] Yes:  [X] No: N/A       Recommendations:      1) Continue diet as above. Defer diet texture/consistency to team. Defer fluid restriction to team.   2) Continue 3 servings Nepro supplement; provide feeding assistance and encouragement.  3) Reinforce post-transplant nutrition therapy and food safety guidelines in-house and prior to discharge, as appropriate.  4) Discharge diet: Continue as above. Recommend follow up visit with Transplant MD and outpatient RD for dietary modifications as warranted.     Monitoring and Evaluation:   Continue to monitor nutritional intake, tolerance to diet prescription, weights, labs, skin integrity      RD remains available upon request and will follow up per protocol  Wendie Lazaro, MS RD CDN [facility] (pager [phone])

## 2022-08-10 NOTE — PROGRESS NOTE ADULT - PROBLEM SELECTOR PLAN 1
-Test BG AC/HS/2am  -C/w Lantus 12 units q hs for now. Not 18 units  -Add Admelog 3-4 units ac meals if eating  -C/w Admelog moderate dose correction scales AC and low dose HS/2am scales  -Please contact endo team with changes on steroid doses/diet to make appropriate insulin doses adjustments.    Discharge  plan to rehab, c/w basal bolus insulin final doses as above if FBG at goal  Titrate further at rehab as necessary .   Outpatient f/u with Endocrinologist Dr. Lincoln. [address] [facility] [address]. Phone  Needs apt at time of discharge  -Needs optho/renal/cardiac f/u as out pt  -Make sure pt has insulin and DM supplies at time of discharge.

## 2022-08-10 NOTE — PROGRESS NOTE ADULT - SUBJECTIVE AND OBJECTIVE BOX
Transplant Surgery - Multidisciplinary Progress Note  --------------------------------------------------------------  DDRT     4/21/2022             Present:  Patient seen with multidisciplinary team including Transplant Surgeon: Dr. Tena,  Nephrologist: Dr. NORBERTO Lomeli, transplant pharmacist CELIA Martinez, nephrology fellow,, NP Jose. Disciplines not in attendance will be notified of the plan.      66M with PMH: DM type II, HTN, CAD s/p CABG in 2020, AFib on Eliquis, CVA in 2019 due to Afib, Seizure d/o following CVA, last episode was on 4/8/22, h/o GIB in 2/2022 EGD with duodenal ulcer.  ESRD due to DM was on HD since 2019.     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  Donor was 58 , KDPI 82%, DCD, single vessels and ureter, HLA mismatch 1, 2, 2. No DSA, cPRA 0%. 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 Re-admitted in May for anemia in the setting of large perinephric hematoma s/p evacuation and repair of arterial anastomosis on 5/2/22. Intra operative biopsy showed no rejection, Creatinine ranging ~2mg/dL.    In Rehab:  He was recently dx with klebsiella UTI with Ertapenem - then switched to Levaquin    He also had parainfluenza pneumonia. Was at rehab progressing well.      Hospital course:  - 6/10: transferred from rehab facility for seizure status epilepticus. Intubated for respiratory protection and transferred to MICU.  EEG showed no seizure activity. Neuro consulted.   - 6/11: Extubated. Found to have R pleural effusion. s/p Thoracentesis 1.3L. Eliquis changed to heparin drip.  Post procedure drop in H&H. 5u PRBC, 2 u FFP.   - 6/11 evening: Transferred to SICU from MICU for further care in setting of hypoxia, significant R pleural effusion, anemia and hemodynamic instability  - 6/11 Intubated at 9pm and sedated on Precedex.  CT placed, 600ml blood drained.  1L total overnight, started pressors  - 6/11 Received Protamine for PTT >100 (was on Heparin drip started for AF), 2 u FFP and 5u PRBC total  - 6/13 s/p VATS 2.2L old blood removed; second chest tube placed  - 6/18-19: chest tubes removed  - 6/21: ?PRES vs. chronic ischemic changes on MRI, off Envarsus, switched to Belatacept 6/23 with good graft function  - 6/25: Tx to SICU for refractory seizures, improved with IV antiepileptic regimen  - 7/10: Downgraded from SICU to floor.   - 7/11: OR-->URETERAL STENT REMOVED  - 7/15: ESBL Kleb UTI (50-90K), completed Ertapenem x 3 Days  - 7/25: Tx to SICU for Sepsis/ elevated LFTS  - 7/27: Shiley placed for HD  - 7/28: ongoing fevers -> started Ertapenem/Fluc  - 7/29: HD restarted + 1U PRBC.  IR bx with 2A rejection, started pulse steroids. LFTs have improved  - 8/4: refractory seizures    Interval events:   -No seizures seen on EEG, no obvious seizures overnight. On Fosphenytoin 200 BID  -s/p HD daily with removal around 2-3L, s/p permacath exchange 8/8  -more alert passed S&S on PO diet. Seen tolerating diet at bedside with assistance  -R sided erythema around wound stable w/ + scrotal and penile edema. On Vancomycin  - 8/8 S/P punch biopsy of wound by derm. Culture ngtd  -LE venous and arterial dopplers ordered/pending      Immunosuppression  Induction:  Thymoglobulin                                        Maintenance:  - Belatacept: 6/23, 7/4, 7/8, 7/18. 8/1, now discontinued  - On Sirolimus as of 8/2  - Cellcept to 250 BID  - Pred 20mg po qd   - Ongoing monitoring for signs of rejection.    Potential Discharge date: pending clinical improvement.     Education:  Medications    Plan of care:  See Below      MEDICATIONS  (STANDING):  amLODIPine   Tablet 10 milliGRAM(s) Oral daily  chlorhexidine 2% Cloths 1 Application(s) Topical <User Schedule>  doxazosin 4 milliGRAM(s) Oral <User Schedule>  epoetin cortney-epbx (RETACRIT) Injectable 75482 Unit(s) SubCutaneous every 7 days  finasteride 5 milliGRAM(s) Oral daily  fluconAZOLE   Tablet 400 milliGRAM(s) Oral daily  fosphenytoin IVPB 200 milliGRAM(s) PE IV Intermittent every 12 hours  heparin   Injectable 5000 Unit(s) SubCutaneous every 8 hours  hydrALAZINE 25 milliGRAM(s) Oral every 8 hours  insulin glargine Injectable (LANTUS) 18 Unit(s) SubCutaneous at bedtime  insulin lispro (ADMELOG) corrective regimen sliding scale   SubCutaneous three times a day before meals  insulin lispro (ADMELOG) corrective regimen sliding scale   SubCutaneous at bedtime  insulin lispro Injectable (ADMELOG) 3 Unit(s) SubCutaneous three times a day before meals  levothyroxine 50 MICROGram(s) Oral daily  melatonin 6 milliGRAM(s) Oral at bedtime  mycophenolate mofetil 250 milliGRAM(s) Oral <User Schedule>  pantoprazole    Tablet 40 milliGRAM(s) Oral before breakfast  petrolatum white Ointment 1 Application(s) Topical two times a day  polyethylene glycol 3350 17 Gram(s) Oral daily  predniSONE   Tablet 20 milliGRAM(s) Oral daily  senna 2 Tablet(s) Oral at bedtime  sirolimus 2.5 milliGRAM(s) Oral <User Schedule>  trimethoprim   80 mG/sulfamethoxazole 400 mG 1 Tablet(s) Oral daily  valGANciclovir 450 milliGRAM(s) Oral <User Schedule>    MEDICATIONS  (PRN):  acetaminophen     Tablet .. 650 milliGRAM(s) Oral every 6 hours PRN Mild Pain (1 - 3)      PAST MEDICAL & SURGICAL HISTORY:  Hypertension      Diabetes      Dyslipidemia      CAD (Coronary Artery Disease)  with Stents in 06/2009 and 6/2019, s/p off-pump C3L on 8/13/20      Hypothyroidism      CVA (cerebral vascular accident)  12/13/19 with residual bilateral weakness      Anemia      PEG (percutaneous endoscopic gastrostomy) status  removed July 2020      Intubation of airway performed without difficulty  Dec 2019  CVA c/b status epilepticus requiring intubation and PEG in 12/2019-      ESRD on dialysis  M/W/F      Seizures  after CVA, last seizure was last week 4/07/22      History of insertion of stent into coronary artery bypass graft  2009 and 2019      S/P CABG x 3  off pump C3L on 8/13/20          Vital Signs Last 24 Hrs  T(C): 36.7 (10 Aug 2022 15:00), Max: 37.6 (10 Aug 2022 03:00)  T(F): 98.1 (10 Aug 2022 15:00), Max: 99.7 (10 Aug 2022 03:00)  HR: 64 (10 Aug 2022 15:00) (63 - 69)  BP: 176/69 (10 Aug 2022 15:00) (131/63 - 186/75)  BP(mean): 99 (10 Aug 2022 15:00) (82 - 114)  RR: 14 (10 Aug 2022 15:00) (13 - 20)  SpO2: 99% (10 Aug 2022 15:00) (97% - 100%)    Parameters below as of 10 Aug 2022 13:35  Patient On (Oxygen Delivery Method): room air        I&O's Summary    09 Aug 2022 07:01  -  10 Aug 2022 07:00  --------------------------------------------------------  IN: 1406.5 mL / OUT: 3000 mL / NET: -1593.5 mL    10 Aug 2022 07:01  -  10 Aug 2022 15:53  --------------------------------------------------------  IN: 450 mL / OUT: 0 mL / NET: 450 mL                              9.1    20.62 )-----------( 364      ( 10 Aug 2022 05:57 )             27.9     08-10    130<L>  |  92<L>  |  68<H>  ----------------------------<  120<H>  4.2   |  21<L>  |  4.25<H>    Ca    8.3<L>      10 Aug 2022 05:57  Phos  4.1     08-10  Mg     2.0     08-10    TPro  5.9<L>  /  Alb  2.0<L>  /  TBili  0.2  /  DBili  0.1  /  AST  16  /  ALT  12  /  AlkPhos  259<H>  08-10          Culture - Tissue with Gram Stain (collected 08-08-22 @ 17:44)  Source: .Tissue Other  Gram Stain (08-09-22 @ 02:45):    No polymorphonuclear cells seen seen per low power field    No organisms seen per oil power field  Preliminary Report (08-09-22 @ 20:16):    No growth    Culture - Acid Fast - Tissue w/Smear (collected 08-08-22 @ 17:44)  Source: .Tissue Other  Preliminary Report (08-10-22 @ 15:04):    Culture is being performed.    Culture - Fungal, Tissue (collected 08-08-22 @ 17:44)  Source: .Tissue Other  Preliminary Report (08-09-22 @ 08:13):    Testing in progress    Culture - Blood (collected 08-08-22 @ 04:01)  Source: .Blood Blood-Peripheral  Preliminary Report (08-09-22 @ 12:01):    No growth to date.    Culture - Blood (collected 08-08-22 @ 04:01)  Source: .Blood Blood-Peripheral  Gram Stain (08-09-22 @ 15:30):    Growth in aerobic bottle: Gram Positive Cocci in Clusters  Preliminary Report (08-09-22 @ 15:30):    Growth in aerobic bottle: Gram Positive Cocci in Clusters    ***Blood Panel PCR results on this specimen are available    approximately 3 hours after the Gram stain result.***    Gram stain, PCR, and/or culture results may not always    correspond due to difference in methodologies.    ************************************************************    This PCR assay was performed by multiplex PCR. This    Assay tests for 66 bacterial and resistance gene targets.    Please refer to the [facility] test directory    at [email or web address] for details.  Organism: Blood Culture PCR (08-09-22 @ 16:40)  Organism: Blood Culture PCR (08-09-22 @ 16:40)    Culture - Blood (collected 08-03-22 @ 23:46)  Source: .Blood Blood  Final Report (08-09-22 @ 03:00):    No Growth Final    Culture - Blood (collected 08-03-22 @ 23:46)  Source: .Blood Blood  Final Report (08-09-22 @ 03:00):    No Growth Final          REVIEW OF SYSTEMS  --------------------------------------------------------------------------------  unable to obtain full accurate ROS, but denies CP, SOB.    PHYSICAL EXAM:  Constitutional: awake, weak, not coherent  Eyes: Anicteric  ENMT: nc/at  Respiratory: not tachypneic, non-labored on RA    Cardiovascular: normotensive, regular rate on monitor   Gastrointestinal: Soft, non distended, nontender, RLQ incisional/groin ecchymotic.   Genitourinary: anuric on HD   Extremities: SCD's in place and working bilaterally, overall with worsening edema/anasarca  Vascular: Palpable dp pulses bilaterally  Neurological: AAOx3  Skin: no rashes, ulcerations or lesions  Musculoskeletal: Moving all extremities  Psychiatric: overall calm, but with  periods of agitation

## 2022-08-10 NOTE — PROGRESS NOTE ADULT - ASSESSMENT
66 yr old M with Type 2 DM> unknown control due to skewed A1C 6.6% secondary to chronic anemia and s/p blood tx. On basal/bolus insulin therapy PTA. DM c/b ESRD s/p DDRT on 3/21, CVA, CAD s/p CABG, Afib on apixaban, seizure activity, HTN, HLD, h/o GI bleed in 2/2022 EGD with duodenal ulcer, discharged to [facility] rehab center after prior hospitalization, now re-admitted from [facility] for seizures c/f status epilepticus in setting of UTI. endocrine consulted for steroid induced hyperglycemia. Prolonged hospital course w/ ICU stay, previously intubated/extubated, previous seizures. S/p ureteral stent removal 7/12/22. 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 Tolerating POs with prandial BG going up due to lack of standing premeal insulin. FBG at goal but ICU team increased basal insulin dose for tonight (12 to 18 units). Spoke to team about high risk for hypoglycemia while on this high basal insulin dose in the setting of ANA. Recommended to add premeal insulin instead of increasing basal insulin as discussed yesterday. BG goal 100 to 180s.

## 2022-08-10 NOTE — PROGRESS NOTE ADULT - PROBLEM SELECTOR PLAN 3
On LT4 50 mcg daily  Noted med given with other meds including Protonix which inhibits LT4 absorption  Please make sure LT4 is given on an empty stomach at least one hour apart from other meds and food to ensure med absorption. DO NOT GIVE WITH VITAMINS/ANTACIDS  If unable to ensure proper time administration switching to IV dosing in order to ensure med absorption. Synthroid 37.5mcg IV   Monitor TFTs outpatient. TSH can be skewed when critically ill

## 2022-08-11 NOTE — PROGRESS NOTE ADULT - SUBJECTIVE AND OBJECTIVE BOX
Transplant Surgery - Multidisciplinary Progress Note  --------------------------------------------------------------  DDRT     4/21/2022             Present:  Patient seen with multidisciplinary team including Transplant Surgeon: Dr. Tena,  Nephrologist: Dr. NORBERTO Lomeli, transplant pharmacist CELIA Martinez, nephrology fellow,, NP Jose. Disciplines not in attendance will be notified of the plan.      66M with PMH: DM type II, HTN, CAD s/p CABG in 2020, AFib on Eliquis, CVA in 2019 due to Afib, Seizure d/o following CVA, last episode was on 4/8/22, h/o GIB in 2/2022 EGD with duodenal ulcer.  ESRD due to DM was on HD since 2019.     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  Donor was 58 , KDPI 82%, DCD, single vessels and ureter, HLA mismatch 1, 2, 2. No DSA, cPRA 0%. 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 Re-admitted in May for anemia in the setting of large perinephric hematoma s/p evacuation and repair of arterial anastomosis on 5/2/22. Intra operative biopsy showed no rejection, Creatinine ranging ~2mg/dL.    In Rehab:  He was recently dx with klebsiella UTI with Ertapenem - then switched to Levaquin    He also had parainfluenza pneumonia. Was at rehab progressing well.      Hospital course:  - 6/10: transferred from rehab facility for seizure status epilepticus. Intubated for respiratory protection and transferred to MICU.  EEG showed no seizure activity. Neuro consulted.   - 6/11: Extubated. Found to have R pleural effusion. s/p Thoracentesis 1.3L. Eliquis changed to heparin drip.  Post procedure drop in H&H. 5u PRBC, 2 u FFP.   - 6/11 evening: Transferred to SICU from MICU for further care in setting of hypoxia, significant R pleural effusion, anemia and hemodynamic instability  - 6/11 Intubated at 9pm and sedated on Precedex.  CT placed, 600ml blood drained.  1L total overnight, started pressors  - 6/11 Received Protamine for PTT >100 (was on Heparin drip started for AF), 2 u FFP and 5u PRBC total  - 6/13 s/p VATS 2.2L old blood removed; second chest tube placed  - 6/18-19: chest tubes removed  - 6/21: ?PRES vs. chronic ischemic changes on MRI, off Envarsus, switched to Belatacept 6/23 with good graft function  - 6/25: Tx to SICU for refractory seizures, improved with IV antiepileptic regimen  - 7/10: Downgraded from SICU to floor.   - 7/11: OR-->URETERAL STENT REMOVED  - 7/15: ESBL Kleb UTI (50-90K), completed Ertapenem x 3 Days  - 7/25: Tx to SICU for Sepsis/ elevated LFTS  - 7/27: Shiley placed for HD  - 7/28: ongoing fevers -> started Ertapenem/Fluc  - 7/29: HD restarted + 1U PRBC.  IR bx with 2A rejection, started pulse steroids. LFTs have improved  - 8/4: refractory seizures    Interval events:   -No obvious seizures overnight. On Fosphenytoin 200 BID  -s/p HD daily with removal around 2-3L, s/p permacath exchange 8/8  -more alert passed S&S on PO diet. Seen tolerating diet at bedside with assistance  -R sided erythema around wound stable w/ + scrotal and penile edema. On Vancomycin  -8/8 S/P punch biopsy of wound by derm, without pathologic finding. Culture ngtd  -LE venous and arterial dopplers with no DVT or arterial occlusion      Immunosuppression  Induction:  Thymoglobulin                                        Maintenance:  - Belatacept: 6/23, 7/4, 7/8, 7/18. 8/1, now discontinued  - On Sirolimus as of 8/2  - Cellcept to 250 BID  - Pred 20mg po qd   - Ongoing monitoring for signs of rejection.    Potential Discharge date: pending clinical improvement.     Education:  Medications    Plan of care:  See Below      MEDICATIONS  (STANDING):  amLODIPine   Tablet 10 milliGRAM(s) Oral daily  chlorhexidine 2% Cloths 1 Application(s) Topical <User Schedule>  doxazosin 4 milliGRAM(s) Oral <User Schedule>  epoetin cortney-epbx (RETACRIT) Injectable 42251 Unit(s) SubCutaneous every 7 days  finasteride 5 milliGRAM(s) Oral daily  fluconAZOLE   Tablet 400 milliGRAM(s) Oral daily  fosphenytoin IVPB 200 milliGRAM(s) PE IV Intermittent every 12 hours  heparin   Injectable 5000 Unit(s) SubCutaneous every 8 hours  hydrALAZINE 25 milliGRAM(s) Oral every 8 hours  insulin glargine Injectable (LANTUS) 12 Unit(s) SubCutaneous at bedtime  insulin lispro (ADMELOG) corrective regimen sliding scale   SubCutaneous three times a day before meals  insulin lispro (ADMELOG) corrective regimen sliding scale   SubCutaneous at bedtime  levothyroxine 50 MICROGram(s) Oral daily  melatonin 6 milliGRAM(s) Oral at bedtime  mycophenolate mofetil 250 milliGRAM(s) Oral <User Schedule>  pantoprazole    Tablet 40 milliGRAM(s) Oral <User Schedule>  petrolatum white Ointment 1 Application(s) Topical two times a day  polyethylene glycol 3350 17 Gram(s) Oral daily  predniSONE   Tablet 20 milliGRAM(s) Oral daily  senna 2 Tablet(s) Oral at bedtime  sirolimus 2.5 milliGRAM(s) Oral <User Schedule>  trimethoprim   80 mG/sulfamethoxazole 400 mG 1 Tablet(s) Oral daily  valGANciclovir 450 milliGRAM(s) Oral <User Schedule>  vancomycin  IVPB 1000 milliGRAM(s) IV Intermittent once    MEDICATIONS  (PRN):  acetaminophen     Tablet .. 650 milliGRAM(s) Oral every 6 hours PRN Mild Pain (1 - 3)      PAST MEDICAL & SURGICAL HISTORY:  Hypertension      Diabetes      Dyslipidemia      CAD (Coronary Artery Disease)  with Stents in 06/2009 and 6/2019, s/p off-pump C3L on 8/13/20      Hypothyroidism      CVA (cerebral vascular accident)  12/13/19 with residual bilateral weakness      Anemia      PEG (percutaneous endoscopic gastrostomy) status  removed July 2020      Intubation of airway performed without difficulty  Dec 2019  CVA c/b status epilepticus requiring intubation and PEG in 12/2019-      ESRD on dialysis  M/W/F      Seizures  after CVA, last seizure was last week 4/07/22      History of insertion of stent into coronary artery bypass graft  2009 and 2019      S/P CABG x 3  off pump C3L on 8/13/20          Vital Signs Last 24 Hrs  T(C): 37.3 (11 Aug 2022 08:00), Max: 37.6 (11 Aug 2022 03:00)  T(F): 99.1 (11 Aug 2022 08:00), Max: 99.7 (11 Aug 2022 03:00)  HR: 69 (11 Aug 2022 10:00) (63 - 74)  BP: 117/59 (11 Aug 2022 10:00) (117/59 - 189/76)  BP(mean): 79 (11 Aug 2022 10:00) (75 - 109)  RR: 27 (11 Aug 2022 10:00) (13 - 27)  SpO2: 97% (11 Aug 2022 10:00) (95% - 100%)    Parameters below as of 11 Aug 2022 08:00  Patient On (Oxygen Delivery Method): room air        I&O's Summary    10 Aug 2022 07:01  -  11 Aug 2022 07:00  --------------------------------------------------------  IN: 1554 mL / OUT: 3500 mL / NET: -1946 mL                              7.7    18.47 )-----------( 340      ( 11 Aug 2022 05:38 )             24.4     08-11    134<L>  |  94<L>  |  52<H>  ----------------------------<  88  3.7   |  24  |  3.48<H>    Ca    8.6      11 Aug 2022 05:37  Phos  3.3     08-11  Mg     2.3     08-11    TPro  5.4<L>  /  Alb  2.0<L>  /  TBili  0.2  /  DBili  0.1  /  AST  13  /  ALT  10  /  AlkPhos  227<H>  08-11          Culture - Blood (collected 08-09-22 @ 18:32)  Source: .Blood Blood-Peripheral  Preliminary Report (08-11-22 @ 02:01):    No growth to date.    Culture - Blood (collected 08-09-22 @ 18:32)  Source: .Blood Blood-Peripheral  Preliminary Report (08-11-22 @ 02:01):    No growth to date.    Culture - Tissue with Gram Stain (collected 08-08-22 @ 17:44)  Source: .Tissue Other  Gram Stain (08-09-22 @ 02:45):    No polymorphonuclear cells seen seen per low power field    No organisms seen per oil power field  Preliminary Report (08-09-22 @ 20:16):    No growth    Culture - Acid Fast - Tissue w/Smear (collected 08-08-22 @ 17:44)  Source: .Tissue Other  Preliminary Report (08-10-22 @ 15:04):    Culture is being performed.    Culture - Fungal, Tissue (collected 08-08-22 @ 17:44)  Source: .Tissue Other  Preliminary Report (08-09-22 @ 08:13):    Testing in progress    Culture - Blood (collected 08-08-22 @ 04:01)  Source: .Blood Blood-Peripheral  Preliminary Report (08-09-22 @ 12:01):    No growth to date.    Culture - Blood (collected 08-08-22 @ 04:01)  Source: .Blood Blood-Peripheral  Gram Stain (08-09-22 @ 15:30):    Growth in aerobic bottle: Gram Positive Cocci in Clusters  Preliminary Report (08-10-22 @ 16:30):    Growth in aerobic bottle: Staphylococcus hominis    Coag Negative Staphylococcus    Single set isolate, possible contaminant. Contact    Microbiology if susceptibility testing clinically    indicated.    ***Blood Panel PCR results on this specimen are available    approximately 3 hours after the Gram stain result.***    Gram stain, PCR, and/or culture results may not always    correspond due to difference in methodologies.    ************************************************************    This PCR assay was performed by multiplex PCR. This    Assay tests for 66 bacterial and resistance gene targets.    Please refer to the [facility] Labs test directory    at [email or web address] for details.  Organism: Blood Culture PCR (08-09-22 @ 16:40)  Organism: Blood Culture PCR (08-09-22 @ 16:40)                REVIEW OF SYSTEMS  --------------------------------------------------------------------------------  unable to obtain full accurate ROS, but denies CP, SOB.    PHYSICAL EXAM:  Constitutional: awake, weak, not coherent  Eyes: Anicteric  ENMT: nc/at  Respiratory: not tachypneic, non-labored on RA    Cardiovascular: normotensive, regular rate on monitor   Gastrointestinal: Soft, non distended, nontender, RLQ incisional/groin ecchymotic.   Genitourinary: anuric on HD   Extremities: SCD's in place and working bilaterally, overall with worsening edema/anasarca  Vascular: Palpable dp pulses bilaterally  Neurological: AAOx3  Skin: no rashes, ulcerations or lesions  Musculoskeletal: Moving all extremities  Psychiatric: overall calm, but with  periods of agitation     Transplant Surgery - Multidisciplinary Progress Note  --------------------------------------------------------------  DDRT     4/21/2022             Present:  Patient seen with multidisciplinary team including Transplant Surgeon: Dr. Tena,  Nephrologist: Dr. NORBERTO Lomeli, transplant pharmacist CELIA Martinez, nephrology fellow,, NP Jensen. Disciplines not in attendance will be notified of the plan.      66M with PMH: DM type II, HTN, CAD s/p CABG in 2020, AFib on Eliquis, CVA in 2019 due to Afib, Seizure d/o following CVA, last episode was on 4/8/22, h/o GIB in 2/2022 EGD with duodenal ulcer.  ESRD due to DM was on HD since 2019.     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  Donor was 58 , KDPI 82%, DCD, single vessels and ureter, HLA mismatch 1, 2, 2. No DSA, cPRA 0%. 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 Re-admitted in May for anemia in the setting of large perinephric hematoma s/p evacuation and repair of arterial anastomosis on 5/2/22. Intra operative biopsy showed no rejection, Creatinine ranging ~2mg/dL.    In Rehab:  He was recently dx with klebsiella UTI with Ertapenem - then switched to Levaquin    He also had parainfluenza pneumonia. Was at rehab progressing well.      Hospital course:  - 6/10: transferred from rehab facility for seizure status epilepticus. Intubated for respiratory protection and transferred to MICU.  EEG showed no seizure activity. Neuro consulted.   - 6/11: Extubated. Found to have R pleural effusion. s/p Thoracentesis 1.3L. Eliquis changed to heparin drip.  Post procedure drop in H&H. 5u PRBC, 2 u FFP.   - 6/11 evening: Transferred to SICU from MICU for further care in setting of hypoxia, significant R pleural effusion, anemia and hemodynamic instability  - 6/11 Intubated at 9pm and sedated on Precedex.  CT placed, 600ml blood drained.  1L total overnight, started pressors  - 6/11 Received Protamine for PTT >100 (was on Heparin drip started for AF), 2 u FFP and 5u PRBC total  - 6/13 s/p VATS 2.2L old blood removed; second chest tube placed  - 6/18-19: chest tubes removed  - 6/21: ?PRES vs. chronic ischemic changes on MRI, off Envarsus, switched to Belatacept 6/23 with good graft function  - 6/25: Tx to SICU for refractory seizures, improved with IV antiepileptic regimen  - 7/10: Downgraded from SICU to floor.   - 7/11: OR-->URETERAL STENT REMOVED  - 7/15: ESBL Kleb UTI (50-90K), completed Ertapenem x 3 Days  - 7/25: Tx to SICU for Sepsis/ elevated LFTS  - 7/27: Shiley placed for HD  - 7/28: ongoing fevers -> started Ertapenem/Fluc  - 7/29: HD restarted + 1U PRBC.  IR bx with 2A rejection, started pulse steroids. LFTs have improved  - 8/4: refractory seizures    Interval events:   -No obvious seizures overnight. On Fosphenytoin 200 BID  -s/p HD daily with removal around 2-3L, s/p permacath exchange 8/8  -more alert passed S&S on PO diet. Seen tolerating diet at bedside with assistance  -R sided erythema around wound stable w/ + scrotal and penile edema. On Vancomycin  -8/8 S/P punch biopsy of wound by derm, without pathologic finding. Culture ngtd  -LE venous and arterial dopplers with no DVT or arterial occlusion      Immunosuppression  Induction:  Thymoglobulin                                        Maintenance:  - Belatacept: 6/23, 7/4, 7/8, 7/18. 8/1, now discontinued  - On Sirolimus as of 8/2  - Cellcept to 250 BID  - Pred 20mg po qd   - Ongoing monitoring for signs of rejection.    Potential Discharge date: pending clinical improvement.     Education:  Medications    Plan of care:  See Below      MEDICATIONS  (STANDING):  amLODIPine   Tablet 10 milliGRAM(s) Oral daily  chlorhexidine 2% Cloths 1 Application(s) Topical <User Schedule>  doxazosin 4 milliGRAM(s) Oral <User Schedule>  epoetin cortney-epbx (RETACRIT) Injectable 55769 Unit(s) SubCutaneous every 7 days  finasteride 5 milliGRAM(s) Oral daily  fluconAZOLE   Tablet 400 milliGRAM(s) Oral daily  fosphenytoin IVPB 200 milliGRAM(s) PE IV Intermittent every 12 hours  heparin   Injectable 5000 Unit(s) SubCutaneous every 8 hours  hydrALAZINE 25 milliGRAM(s) Oral every 8 hours  insulin glargine Injectable (LANTUS) 12 Unit(s) SubCutaneous at bedtime  insulin lispro (ADMELOG) corrective regimen sliding scale   SubCutaneous three times a day before meals  insulin lispro (ADMELOG) corrective regimen sliding scale   SubCutaneous at bedtime  levothyroxine 50 MICROGram(s) Oral daily  melatonin 6 milliGRAM(s) Oral at bedtime  mycophenolate mofetil 250 milliGRAM(s) Oral <User Schedule>  pantoprazole    Tablet 40 milliGRAM(s) Oral <User Schedule>  petrolatum white Ointment 1 Application(s) Topical two times a day  polyethylene glycol 3350 17 Gram(s) Oral daily  predniSONE   Tablet 20 milliGRAM(s) Oral daily  senna 2 Tablet(s) Oral at bedtime  sirolimus 2.5 milliGRAM(s) Oral <User Schedule>  trimethoprim   80 mG/sulfamethoxazole 400 mG 1 Tablet(s) Oral daily  valGANciclovir 450 milliGRAM(s) Oral <User Schedule>  vancomycin  IVPB 1000 milliGRAM(s) IV Intermittent once    MEDICATIONS  (PRN):  acetaminophen     Tablet .. 650 milliGRAM(s) Oral every 6 hours PRN Mild Pain (1 - 3)      PAST MEDICAL & SURGICAL HISTORY:  Hypertension      Diabetes      Dyslipidemia      CAD (Coronary Artery Disease)  with Stents in 06/2009 and 6/2019, s/p off-pump C3L on 8/13/20      Hypothyroidism      CVA (cerebral vascular accident)  12/13/19 with residual bilateral weakness      Anemia      PEG (percutaneous endoscopic gastrostomy) status  removed July 2020      Intubation of airway performed without difficulty  Dec 2019  CVA c/b status epilepticus requiring intubation and PEG in 12/2019-      ESRD on dialysis  M/W/F      Seizures  after CVA, last seizure was last week 4/07/22      History of insertion of stent into coronary artery bypass graft  2009 and 2019      S/P CABG x 3  off pump C3L on 8/13/20          Vital Signs Last 24 Hrs  T(C): 37.3 (11 Aug 2022 08:00), Max: 37.6 (11 Aug 2022 03:00)  T(F): 99.1 (11 Aug 2022 08:00), Max: 99.7 (11 Aug 2022 03:00)  HR: 69 (11 Aug 2022 10:00) (63 - 74)  BP: 117/59 (11 Aug 2022 10:00) (117/59 - 189/76)  BP(mean): 79 (11 Aug 2022 10:00) (75 - 109)  RR: 27 (11 Aug 2022 10:00) (13 - 27)  SpO2: 97% (11 Aug 2022 10:00) (95% - 100%)    Parameters below as of 11 Aug 2022 08:00  Patient On (Oxygen Delivery Method): room air        I&O's Summary    10 Aug 2022 07:01  -  11 Aug 2022 07:00  --------------------------------------------------------  IN: 1554 mL / OUT: 3500 mL / NET: -1946 mL                              7.7    18.47 )-----------( 340      ( 11 Aug 2022 05:38 )             24.4     08-11    134<L>  |  94<L>  |  52<H>  ----------------------------<  88  3.7   |  24  |  3.48<H>    Ca    8.6      11 Aug 2022 05:37  Phos  3.3     08-11  Mg     2.3     08-11    TPro  5.4<L>  /  Alb  2.0<L>  /  TBili  0.2  /  DBili  0.1  /  AST  13  /  ALT  10  /  AlkPhos  227<H>  08-11          Culture - Blood (collected 08-09-22 @ 18:32)  Source: .Blood Blood-Peripheral  Preliminary Report (08-11-22 @ 02:01):    No growth to date.    Culture - Blood (collected 08-09-22 @ 18:32)  Source: .Blood Blood-Peripheral  Preliminary Report (08-11-22 @ 02:01):    No growth to date.    Culture - Tissue with Gram Stain (collected 08-08-22 @ 17:44)  Source: .Tissue Other  Gram Stain (08-09-22 @ 02:45):    No polymorphonuclear cells seen seen per low power field    No organisms seen per oil power field  Preliminary Report (08-09-22 @ 20:16):    No growth    Culture - Acid Fast - Tissue w/Smear (collected 08-08-22 @ 17:44)  Source: .Tissue Other  Preliminary Report (08-10-22 @ 15:04):    Culture is being performed.    Culture - Fungal, Tissue (collected 08-08-22 @ 17:44)  Source: .Tissue Other  Preliminary Report (08-09-22 @ 08:13):    Testing in progress    Culture - Blood (collected 08-08-22 @ 04:01)  Source: .Blood Blood-Peripheral  Preliminary Report (08-09-22 @ 12:01):    No growth to date.    Culture - Blood (collected 08-08-22 @ 04:01)  Source: .Blood Blood-Peripheral  Gram Stain (08-09-22 @ 15:30):    Growth in aerobic bottle: Gram Positive Cocci in Clusters  Preliminary Report (08-10-22 @ 16:30):    Growth in aerobic bottle: Staphylococcus hominis    Coag Negative Staphylococcus    Single set isolate, possible contaminant. Contact    Microbiology if susceptibility testing clinically    indicated.    ***Blood Panel PCR results on this specimen are available    approximately 3 hours after the Gram stain result.***    Gram stain, PCR, and/or culture results may not always    correspond due to difference in methodologies.    ************************************************************    This PCR assay was performed by multiplex PCR. This    Assay tests for 66 bacterial and resistance gene targets.    Please refer to the [facility] Labs test directory    at [email or web address] for details.  Organism: Blood Culture PCR (08-09-22 @ 16:40)  Organism: Blood Culture PCR (08-09-22 @ 16:40)                REVIEW OF SYSTEMS  --------------------------------------------------------------------------------  unable to obtain full accurate ROS, but denies CP, SOB.    PHYSICAL EXAM:  Constitutional: awake, weak, not coherent  Eyes: Anicteric  ENMT: nc/at  Respiratory: not tachypneic, non-labored on RA    Cardiovascular: normotensive, regular rate on monitor   Gastrointestinal: Soft, non distended, nontender, RLQ incisional/groin ecchymotic.   Genitourinary: anuric on HD   Extremities: SCD's in place and working bilaterally, overall with worsening edema/anasarca  Vascular: Palpable dp pulses bilaterally  Neurological: AAOx2  Skin: no rashes, ulcerations or lesions  Musculoskeletal: Moving all extremities  Psychiatric: overall calm, but with  periods of agitation

## 2022-08-11 NOTE — PROGRESS NOTE ADULT - NUTRITIONAL ASSESSMENT

## 2022-08-11 NOTE — PROGRESS NOTE ADULT - ASSESSMENT
66 yr old M with Type 2 DM> unknown control due to skewed A1C 6.6% secondary to chronic anemia and s/p blood tx. On basal/bolus insulin therapy PTA. DM c/b ESRD s/p DDRT on 3/21, CVA, CAD s/p CABG, Afib on apixaban, seizure activity, HTN, HLD, h/o GI bleed in 2/2022 EGD with duodenal ulcer, discharged to [facility] rehab center after prior hospitalization, now re-admitted from [facility] for seizures c/f status epilepticus in setting of UTI. endocrine consulted for steroid induced hyperglycemia. Prolonged hospital course w/ ICU stay, previously intubated/extubated, previous seizures. S/p ureteral stent removal 7/12/22. 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 now awaiting IR bx.     Type 2 diabetes mellitus with hypoglycemia, with long-term current use of insulin.   Decreased renal function on HD can cause prolonged insulin action although pt also with hypoalbuminemia so would c/w lantus & adjust dose instead of changing to levemir( levemir associated w/ increase hypoglycemia in pts w/ hypoalbuminemia )     ·  Plan: BG Goal 100-180mg/dl   -Test BG AC/HS/2am  -Reduce Lantus 9 units q hs for now  -Please reorder Admelog 3 units ac meals if eating  -Change to LOW correction scales tid ac, and LOW qHS & 2AM scales  -Please contact endo team with changes on steroid doses/diet to make appropriate insulin doses adjustments.    -change Abx to NS as able   Discharge  plan to rehab, c/w basal bolus insulin final doses tbd  Outpatient f/u with Endocrinologist Dr. Lincoln. [address] [facility] [address]. Phone  Needs apt at time of discharge  -Needs optho/renal/cardiac f/u as out pt  -Make sure pt has insulin and DM supplies at time of discharge.     Problem/Plan - 2:  ·  Problem: HTN (hypertension).   ·  Plan: Manage per ICU team/renal.  On and off hypertension.     Problem/Plan - 3:  ·  Problem: Hypothyroidism.   ·  Plan: On LT4 50 mcg daily  Noted med given with other meds including Protonix which inhibits LT4 absorption  Please make sure LT4 is given on an empty stomach at least one hour apart from other meds and food to ensure med absorption. DO NOT GIVE WITH VITAMINS/ANTACIDS  If unable to ensure proper time administration switching to IV dosing in order to ensure med absorption. Synthroid 37.5mcg IV   Monitor TFTs outpatient. TSH can be skewed when critically ill.      Discussed with patient and primary  team SICU Resident  on unit >team aware of recommendations, team to place orders   Contact via [employer] Teams during business hours  On evenings and weekends, please call [phone] or page endocrine fellow on call.   Please note that this patient may be followed by different provider tomorrow.    greater than 50% of the encounter was spent counseling and/or coordination of care.  26 minutes spent on total encounter; The necessity of the time spent during the encounter on this date of service was due to development of plan of care/coordination of care/glycemic control through review of labs, blood glucose values and vital signs.  66 yr old M with Type 2 DM> unknown control due to skewed A1C 6.6% secondary to chronic anemia and s/p blood tx. On basal/bolus insulin therapy PTA. DM c/b ESRD s/p DDRT on 3/21, CVA, CAD s/p CABG, Afib on apixaban, seizure activity, HTN, HLD, h/o GI bleed in 2/2022 EGD with duodenal ulcer, discharged to [facility] rehab center after prior hospitalization, now re-admitted from [facility] for seizures c/f status epilepticus in setting of UTI. endocrine consulted for steroid induced hyperglycemia. Prolonged hospital course w/ ICU stay, previously intubated/extubated, previous seizures. S/p ureteral stent removal 7/12/22. 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 now awaiting IR bx.     Type 2 diabetes mellitus with hypoglycemia, with long-term current use of insulin.   Decreased renal function on HD can cause prolonged insulin action although pt also with hypoalbuminemia so would c/w lantus & adjust dose instead of changing to levemir( levemir associated w/ increase hypoglycemia in pts w/ hypoalbuminemia ) .   received D50 1/2 amp & ate breakfast w/o coverage w/ rebound hyperglycemia at lunch . would continue prandial insulin if tolerating po  ·  Plan: BG Goal 100-180mg/dl   -Test BG AC/HS/2am  -Reduce Lantus 9 units q hs for now  -Please reorder Admelog 3 units ac meals if eating  -Change to LOW correction scales tid ac, and LOW qHS & 2AM scales  -Please contact endo team with changes on steroid doses/diet to make appropriate insulin doses adjustments.    -change Abx to NS as able   Discharge  plan to rehab, c/w basal bolus insulin final doses tbd  Outpatient f/u with Endocrinologist Dr. Lincoln. [address] [facility] [address] Phone  Needs apt at time of discharge  -Needs optho/renal/cardiac f/u as out pt  -Make sure pt has insulin and DM supplies at time of discharge.     Problem/Plan - 2:  ·  Problem: HTN (hypertension).   ·  Plan: Manage per ICU team/renal.  On and off hypertension.     Problem/Plan - 3:  ·  Problem: Hypothyroidism.   ·  Plan: On LT4 50 mcg daily  Noted med given with other meds including Protonix which inhibits LT4 absorption  Please make sure LT4 is given on an empty stomach at least one hour apart from other meds and food to ensure med absorption. DO NOT GIVE WITH VITAMINS/ANTACIDS  If unable to ensure proper time administration switching to IV dosing in order to ensure med absorption. Synthroid 37.5mcg IV   Monitor TFTs outpatient. TSH can be skewed when critically ill.      Discussed with patient and primary  team SICU Resident  on unit >team aware of recommendations, team to place orders   Contact via [employer] Teams during business hours  On evenings and weekends, please call [phone] or page endocrine fellow on call.   Please note that this patient may be followed by different provider tomorrow.    greater than 50% of the encounter was spent counseling and/or coordination of care.  26 minutes spent on total encounter; The necessity of the time spent during the encounter on this date of service was due to development of plan of care/coordination of care/glycemic control through review of labs, blood glucose values and vital signs.

## 2022-08-11 NOTE — PROGRESS NOTE ADULT - NUTRITIONAL ASSESSMENT
Diet, Soft and Bite Sized:   Consistent Carbohydrate {Evening Snack} (CSTCHOSN)  1000mL Fluid Restriction ([id number])  For patients receiving Renal Replacement - No Protein Restr, No Conc K, No Conc Phos, Low Sodium (RENAL)  Supplement Feeding Modality:  Oral  Nepro Cans or Servings Per Day:  3       Frequency:  Three Times a day (08-10-22 @ 11:07) [Active]

## 2022-08-11 NOTE — PROGRESS NOTE ADULT - NUTRITIONAL ASSESSMENT

## 2022-08-11 NOTE — PROGRESS NOTE ADULT - ASSESSMENT
Patient is a 65 y/o male w/ a PMHx of CAD s/p CABG, AF on Eliquis c/b CVA c/b seizures, HTN, HLD, DM type II, hypothyroidism, GI bleed due to a duodenal ulcer, and ESRD s/p DDRT 4/21/22 c/b DGF requiring HD & large perinephric hematoma 5/22 s/p evacuation w/ revision of arterial anastomosis who presented in status epilepticus 6/10 from rehab requiring intubation for airway protection with a hospital course c/b large right pleural effusion s/p thoracentesis c/b hemothorax while being anticoagulated & requiring intubation for airway protection s/p chest tube placement w/ CT demonstrating retained hemothorax s/p right VATS, status epilepticus again causing AMS requiring intubation for airway protection again, delirium, dysphagia requiring NGT placement for enteral access, hyperkalemia, and poor glycemic control. 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     Neuro: rencephalopathy improved, hx of seizure, with refractory seizure  - c/w Fosphenytoin 200mg IV BID, following levels  - Multimodal pain control w/Tylenol  - Melatonin with Protected sleep to improve delirium  - Neurology following, recs appreciated     Resp: prevent atelectasis  - Maintain SpO2 >92%   - Out of bed to chair, ambulate as tolerated, and incentive spirometry to prevent atelectasis    CVS: hx of HTN, s/p CABG, AF on Eliquis  - Maintain MAP > 65  - Continue Hydralazine 25mg q8h and Amlodipine 10mg qd   -carvedilol on hold 2/2 bradycardia  - lactate cleared    GI: transaminitis improved  - passed swallow, non soft and bite sized diet  - Protonix 40mg IV daily for ppx   - Bowel regimen with Miralax,  senna     Renal: DDRT 4/21 cb DGF, now c/b acute rejection s/p IR renal bx 7/29  - HD during the day removed 3L  - Doxazosin + Proscar  - Continue immunosuppression with sirolimus 2.5mg bid , cellcept 250mg bid prednisone 20mg     Heme:  - Monitor CBC and coags  - SCDs for mechanical VTE ppx   - Subq Heparin  - DVT studies performed and found unremarkable for DVTs    ID: ESBL klebsiella UTI (7/15 + UCx, 7/25 +UCx)  -c/w Vanco by level due to leukocytosis with surrounding erythema of Right groin incision per Transplant ID.   - One blood culture bottle grew Staph epidermidis MR. Follow up ID recs. Repeat blood cultures sent  - Skin biopsy results unremarkable for lymphoma  - Bactrim and Valcyte for immuno ppx      Endo: hx of T2DM, Hypothyroidism,   - Now on Lantus 12u. Pre meal 3u lispro. ISS.  - Continue Home Synthroid 37.5mg ivp    Code status: Full Code   Dispo: SICU

## 2022-08-11 NOTE — PROGRESS NOTE ADULT - ATTENDING COMMENTS
Mental status stable  Kidney not functioning due to rejection.   duplex negative for DVT  punch biopsy of skin negative.   ANA of kidney - anuric  Erythema stable.   Dialysis with UF only for volume removal.

## 2022-08-11 NOTE — PROGRESS NOTE ADULT - SUBJECTIVE AND OBJECTIVE BOX
[facility]   MD NINA MASON MD, DO SADIE RAMIREZ NP    TEL:  OFFICE: [phone]    From 5pm-7am Answering Service [phone]    -- RENAL FOLLOW UP NOTE ---Date of Service 08-11-22 @ 15:11    Patient is a 66y old  Male who presents with a chief complaint of Status Epilepticus (11 Aug 2022 14:52)      Patient seen and examined in ICU.     VITALS:  T(F): 98.4 (08-11-22 @ 13:10), Max: 99.7 (08-11-22 @ 03:00)  HR: 69 (08-11-22 @ 15:00)  BP: 178/74 (08-11-22 @ 15:00)  RR: 17 (08-11-22 @ 15:00)  SpO2: 97% (08-11-22 @ 15:00)  Wt(kg): --    08-10 @ 07:01  -  08-11 @ 07:00  --------------------------------------------------------  IN: 1554 mL / OUT: 3500 mL / NET: -1946 mL    08-11 @ 07:01  -  08-11 @ 15:11  --------------------------------------------------------  IN: 0 mL / OUT: 100 mL / NET: -100 mL          PHYSICAL EXAM:  Constitutional: NAD  Neck: No JVD  Respiratory: CTAB, no wheezes, rales or rhonchi  Cardiovascular: S1, S2, RRR  Gastrointestinal: BS+, soft, NT/ND  Extremities: +  peripheral edema    Hospital Medications:   MEDICATIONS  (STANDING):  amLODIPine   Tablet 10 milliGRAM(s) Oral daily  chlorhexidine 2% Cloths 1 Application(s) Topical <User Schedule>  doxazosin 4 milliGRAM(s) Oral <User Schedule>  epoetin cortney-epbx (RETACRIT) Injectable 42796 Unit(s) SubCutaneous every 7 days  finasteride 5 milliGRAM(s) Oral daily  fosphenytoin IVPB 200 milliGRAM(s) PE IV Intermittent every 12 hours  heparin   Injectable 5000 Unit(s) SubCutaneous every 8 hours  hydrALAZINE 25 milliGRAM(s) Oral every 8 hours  insulin glargine Injectable (LANTUS) 9 Unit(s) SubCutaneous at bedtime  insulin lispro (ADMELOG) corrective regimen sliding scale   SubCutaneous three times a day before meals  insulin lispro (ADMELOG) corrective regimen sliding scale   SubCutaneous <User Schedule>  insulin lispro Injectable (ADMELOG) 3 Unit(s) SubCutaneous three times a day before meals  levothyroxine 50 MICROGram(s) Oral daily  melatonin 6 milliGRAM(s) Oral at bedtime  mycophenolate mofetil 250 milliGRAM(s) Oral <User Schedule>  nystatin    Suspension 768921 Unit(s) Oral four times a day  pantoprazole    Tablet 40 milliGRAM(s) Oral <User Schedule>  petrolatum white Ointment 1 Application(s) Topical two times a day  polyethylene glycol 3350 17 Gram(s) Oral daily  predniSONE   Tablet 20 milliGRAM(s) Oral daily  senna 2 Tablet(s) Oral at bedtime  sirolimus 1 milliGRAM(s) Oral once  trimethoprim   80 mG/sulfamethoxazole 400 mG 1 Tablet(s) Oral daily  valGANciclovir 450 milliGRAM(s) Oral <User Schedule>  vancomycin  IVPB 1000 milliGRAM(s) IV Intermittent once      LABS:  08-11    134<L>  |  94<L>  |  52<H>  ----------------------------<  88  3.7   |  24  |  3.48<H>    Ca    8.6      11 Aug 2022 05:37  Phos  3.3     08-11  Mg     2.3     08-11    TPro  5.4<L>  /  Alb  2.0<L>  /  TBili  0.2  /  DBili  0.1  /  AST  13  /  ALT  10  /  AlkPhos  227<H>  08-11    Creatinine Trend: 3.48 <--, 2.71 <--, 4.25 <--, 3.70 <--, 3.43 <--, 3.23 <--, 4.70 <--, 4.58 <--, 3.98 <--, 3.68 <--, 3.91 <--, 3.29 <--, 4.17 <--, 3.97 <--    Albumin, Serum: 2.0 g/dL (08-11 @ 05:37)  Phosphorus Level, Serum: 3.3 mg/dL (08-11 @ 05:37)  Phosphorus Level, Serum: 2.6 mg/dL (08-10 @ 17:30)                              8.5    22.34 )-----------( 339      ( 11 Aug 2022 12:23 )             26.3     Urine Studies:  Urinalysis - [07-25-22 @ 18:28]      Color Yellow / Appearance Slightly Turbid / SG 1.020 / pH 5.5      Gluc Negative / Ketone Negative  / Bili Negative / Urobili Negative       Blood Small / Protein 100 / Leuk Est Large / Nitrite Negative      RBC 2 / WBC 40 / Hyaline 5 / Gran  / Sq Epi  / Non Sq Epi 0 / Bacteria Many      Iron 17, TIBC 171, %sat 10      [05-02-22 @ 13:03]  Ferritin 6336      [07-27-22 @ 13:00]  PTH -- (Ca 9.2)      [02-05-22 @ 10:13]   294  HbA1c 6.0      [02-21-20 @ 08:53]  TSH 4.69      [07-06-22 @ 18:36]  Lipid: chol --, , HDL --, LDL --      [07-27-22 @ 13:00]    HBsAg Nonreact      [07-25-22 @ 11:51]  HCV 0.18, Nonreact      [07-25-22 @ 11:51]      RADIOLOGY & ADDITIONAL STUDIES:

## 2022-08-11 NOTE — PROGRESS NOTE ADULT - ATTENDING COMMENTS
Renal transplant recipient with prolonged admission now with RLQ Abdominal wall erythema, leukocytosis and fevers  Developed following 10 day course of Ertapenem for transplant pyelonephritis  Induration / tenderness improved with Vancomycin but continued to spread in terms of extent  s/p Dermatology skin biopsy for pathology and culture on 8/8    Concern for atypical infectious etiology  Pathology result nonspecific  Would continue Vancomycin by level in the interim (8/4 -->)  Agree with Dermatology recommendation; would see if we can pursue biopsy of abdominal wall muscle for additional culture yield    I will continue to follow. Please feel free to contact me with any further questions.    Carlton Hoover M.D.  [facility] Division of Infectious Disease  8AM-5PM Monday - Friday: Available on [employer] Teams  After Hours and Holidays (or if no response on [employer] Teams): Please contact the Infectious Diseases Office at [phone]    The above assessment and plan were discussed with Dr Tito Jean medications

## 2022-08-11 NOTE — PROGRESS NOTE ADULT - SUBJECTIVE AND OBJECTIVE BOX
Follow Up:      Interval History/ROS:Patient is a 66y old  Male who presents with a chief complaint of Status Epilepticus (11 Aug 2022 02:48)    REVIEW OF SYSTEMS  [ x ] All other systems negative except as noted below    Constitutional:  [ ] fever [ ] chills  [ ] weight loss  [ ]night sweat  [ ]poor appetite/PO intake [ ]fatigue   Skin:  [ ] rash [ ] phlebitis	  Eyes: [ ] icterus [ ] pain  [ ] discharge	  ENMT: [ ] sore throat  [ ] thrush [ ] ulcers [ ] exudates [ ]anosmia  Respiratory: [ ] dyspnea [ ] hemoptysis [ ] cough [ ] sputum	  Cardiovascular:  [ ] chest pain [ ] palpitations [ ] edema	  Gastrointestinal:  [ ] nausea [ ] vomiting [ ] diarrhea [ ] constipation [ ] pain	  Genitourinary:  [ ] dysuria [ ] frequency [ ] hematuria [ ] discharge [ ] flank pain  [ ] incontinence  Musculoskeletal:  [ ] myalgias [ ] arthralgias [ ] arthritis  [ ] back pain  Neurological:  [ ] headache [ ] weakness [ ] seizures  [ ] confusion/altered mental status    Allergies  No Known Allergies    ANTIMICROBIALS:    fluconAZOLE   Tablet 400 daily  trimethoprim   80 mG/sulfamethoxazole 400 mG 1 daily  valGANciclovir 450     OTHER MEDS: MEDICATIONS  (STANDING):  acetaminophen     Tablet .. 650 every 6 hours PRN  amLODIPine   Tablet 10 daily  doxazosin 4 <User Schedule>  epoetin cortney-epbx (RETACRIT) Injectable 01197 every 7 days  finasteride 5 daily  fosphenytoin IVPB 200 every 12 hours  heparin   Injectable 5000 every 8 hours  hydrALAZINE 25 every 8 hours  insulin glargine Injectable (LANTUS) 12 at bedtime  insulin lispro (ADMELOG) corrective regimen sliding scale  three times a day before meals  insulin lispro (ADMELOG) corrective regimen sliding scale  at bedtime  insulin lispro Injectable (ADMELOG) 3 three times a day before meals  levothyroxine 50 daily  melatonin 6 at bedtime  mycophenolate mofetil 250 <User Schedule>  pantoprazole    Tablet 40 <User Schedule>  polyethylene glycol 3350 17 daily  predniSONE   Tablet 20 daily  senna 2 at bedtime  sirolimus 2.5 <User Schedule>      Vital Signs Last 24 Hrs  T(F): 99.1 (08-11-22 @ 08:00), Max: 100.9 (08-08-22 @ 02:50)    Vital Signs Last 24 Hrs  HR: 68 (08-11-22 @ 08:00) (63 - 74)  BP: 134/63 (08-11-22 @ 08:00) (130/60 - 189/76)  RR: 22 (08-11-22 @ 08:00)  SpO2: 98% (08-11-22 @ 08:00) (96% - 100%)  Wt(kg): --    EXAM:    Constitutional:  well preserved, comfortable  Head/Eyes: no icterus, PERRL, EOMI  ENT:  supple; no thrush  LUNGS:  CTA +L chest wall HD catheter c/d/i  CVS:  normal S1, S2, no murmur  Abd:  soft, non-tender; non-distended +RLQ skin erythema, induration, TTP, swelling extending to R groin  Ext:  no edema  Vascular:  IV site no erythema tenderness or discharge  MSK:  joints without swelling  Neuro: AAO X 3, non- focal    Labs:                        7.7    18.47 )-----------( 340      ( 11 Aug 2022 05:38 )             24.4     08-11    134<L>  |  94<L>  |  52<H>  ----------------------------<  88  3.7   |  24  |  3.48<H>    Ca    8.6      11 Aug 2022 05:37  Phos  3.3     08-11  Mg     2.3     08-11    TPro  5.4<L>  /  Alb  2.0<L>  /  TBili  0.2  /  DBili  0.1  /  AST  13  /  ALT  10  /  AlkPhos  227<H>  08-11      WBC Trend:  WBC Count: 18.47 (08-11-22 @ 05:38)  WBC Count: 20.62 (08-10-22 @ 05:57)  WBC Count: 22.22 (08-09-22 @ 05:52)  WBC Count: 19.54 (08-08-22 @ 05:42)    Creatine Trend  Creatinine, Serum: 3.48 (08-11)  Creatinine, Serum: 2.71 (08-10)  Creatinine, Serum: 4.25 (08-10)  Creatinine, Serum: 3.70 (08-09)    Liver Biochemical Testing Trend:  Alanine Aminotransferase (ALT/SGPT): 10 (08-11)  Alanine Aminotransferase (ALT/SGPT): 12 (08-10)  Alanine Aminotransferase (ALT/SGPT): 14 (08-09)  Alanine Aminotransferase (ALT/SGPT): 15 (08-08)  Alanine Aminotransferase (ALT/SGPT): 19 (08-07)  Aspartate Aminotransferase (AST/SGOT): 13 (08-11-22 @ 05:37)  Aspartate Aminotransferase (AST/SGOT): 16 (08-10-22 @ 05:57)  Aspartate Aminotransferase (AST/SGOT): 16 (08-09-22 @ 05:54)  Aspartate Aminotransferase (AST/SGOT): 18 (08-08-22 @ 05:42)  Aspartate Aminotransferase (AST/SGOT): 20 (08-07-22 @ 06:05)  Bilirubin Direct, Serum: 0.1 (08-11)  Bilirubin Total, Serum: 0.2 (08-11)  Bilirubin Direct, Serum: 0.1 (08-10)  Bilirubin Total, Serum: 0.2 (08-10)  Bilirubin Direct, Serum: 0.2 (08-09)    Trend LD  07-30-22 @ 13:02  785<H>  07-25-22 @ 06:30  655<H>  06-11-22 @ 18:47  223  05-02-22 @ 10:05  246<H>  04-30-22 @ 07:15  260<H>    MICROBIOLOGY:  Vancomycin Level, Random: 14.1 (08-11 @ 05:37)  Vancomycin Level, Random: 16.5 (08-10 @ 05:57)  Vancomycin Level, Random: 19.9 (08-09 @ 09:15)  Vancomycin Level, Random: 13.5 (08-08 @ 05:42)  Vancomycin Level, Random: 15.6 (08-07 @ 06:05)    MRSA PCR Result.: NotDetec (07-26-22 @ 03:47)  MRSA PCR Result.: NotDetec (07-13-22 @ 21:02)  MRSA PCR Result.: NotDetec (06-26-22 @ 11:25)      Culture - Blood (collected 09 Aug 2022 18:32)  Source: .Blood Blood-Peripheral  Preliminary Report:    No growth to date.    Culture - Blood (collected 09 Aug 2022 18:32)  Source: .Blood Blood-Peripheral  Preliminary Report:    No growth to date.    Culture - Tissue with Gram Stain (collected 08 Aug 2022 17:44)  Source: .Tissue Other  Preliminary Report:    No growth    Culture - Acid Fast - Tissue w/Smear (collected 08 Aug 2022 17:44)  Source: .Tissue Other  Preliminary Report:    Culture is being performed.    Culture - Fungal, Tissue (collected 08 Aug 2022 17:44)  Source: .Tissue Other  Preliminary Report:    Testing in progress    Culture - Blood (collected 08 Aug 2022 04:01)  Source: .Blood Blood-Peripheral  Preliminary Report:    No growth to date.    Culture - Blood (collected 08 Aug 2022 04:01)  Source: .Blood Blood-Peripheral  Preliminary Report:    Growth in aerobic bottle: Staphylococcus hominis    Coag Negative Staphylococcus    Single set isolate, possible contaminant. Contact    Microbiology if susceptibility testing clinically    indicated.    ***Blood Panel PCR results on this specimen are available    approximately 3 hours after the Gram stain result.***    Gram stain, PCR, and/or culture results may not always    correspond due to difference in methodologies.    ************************************************************    This PCR assay was performed by multiplex PCR. This    Assay tests for 66 bacterial and resistance gene targets.    Please refer to the [facility] test directory    at [email or web address] for details.  Organism: Blood Culture PCR  Organism: Blood Culture PCR    Sensitivities:      -  Staphylococcus epidermidis, Methicillin resistant: Detec      Method Type: PCR    Culture - Blood (collected 03 Aug 2022 23:46)  Source: .Blood Blood  Final Report:    No Growth Final    Culture - Blood (collected 03 Aug 2022 23:46)  Source: .Blood Blood  Final Report:    No Growth Final    Culture - Blood (collected 30 Jul 2022 06:19)  Source: .Blood Blood-Peripheral  Final Report:    No Growth Final    HIV-1/2 Combo Result: Nonreact (04-21-22 @ 14:47)    COVID-19 PCR: NotDetec (08-07-22 @ 18:05)  COVID-19 PCR: NotDetec (07-25-22 @ 11:51)  COVID-19 PCR: NotDetec (07-23-22 @ 07:55)  COVID-19 PCR: NotDetec (07-17-22 @ 07:54)  COVID-19 PCR: NotDetec (07-10-22 @ 16:11)  COVID-19 PCR: NotDetec (07-05-22 @ 06:03)  COVID-19 PCR: NotDetec (06-23-22 @ 07:01)  COVID-19 PCR: NotDetec (06-14-22 @ 12:44)      Procalcitonin, Serum: 0.94 (08-11)  Procalcitonin, Serum: 0.97 (08-10)  Procalcitonin, Serum: 0.95 (08-09)  Procalcitonin, Serum: 0.96 (08-08)  Procalcitonin, Serum: 1.11 (08-07)  Procalcitonin, Serum: 1.18 (08-06)  Procalcitonin, Serum: 1.31 (08-05)    C-Reactive Protein, Serum: 302 (08-08)    Blood Gas Venous - Lactate: 0.8 (08-10 @ 05:45)  Blood Gas Venous - Lactate: 1.3 (08-08 @ 23:27)             Follow Up:  Patient has no complaints. States that RLL rash is someone improved, but cannot say in what way.     Interval History/ROS:Patient is a 66y old  Male who presents with a chief complaint of Status Epilepticus (11 Aug 2022 02:48)    REVIEW OF SYSTEMS  [ x ] All other systems negative except as noted below    Constitutional:  [ ] fever [ ] chills  [ ] weight loss  [ ]night sweat  [ ]poor appetite/PO intake [ ]fatigue   Skin:  [ ] rash [ ] phlebitis	  Eyes: [ ] icterus [ ] pain  [ ] discharge	  ENMT: [ ] sore throat  [ ] thrush [ ] ulcers [ ] exudates [ ]anosmia  Respiratory: [ ] dyspnea [ ] hemoptysis [ ] cough [ ] sputum	  Cardiovascular:  [ ] chest pain [ ] palpitations [ ] edema	  Gastrointestinal:  [ ] nausea [ ] vomiting [ ] diarrhea [ ] constipation [ ] pain	  Genitourinary:  [ ] dysuria [ ] frequency [ ] hematuria [ ] discharge [ ] flank pain  [ ] incontinence  Musculoskeletal:  [ ] myalgias [ ] arthralgias [ ] arthritis  [ ] back pain  Neurological:  [ ] headache [ ] weakness [ ] seizures  [ ] confusion/altered mental status    Allergies  No Known Allergies    ANTIMICROBIALS:    fluconAZOLE   Tablet 400 daily  trimethoprim   80 mG/sulfamethoxazole 400 mG 1 daily  valGANciclovir 450     OTHER MEDS: MEDICATIONS  (STANDING):  acetaminophen     Tablet .. 650 every 6 hours PRN  amLODIPine   Tablet 10 daily  doxazosin 4 <User Schedule>  epoetin cortney-epbx (RETACRIT) Injectable 28892 every 7 days  finasteride 5 daily  fosphenytoin IVPB 200 every 12 hours  heparin   Injectable 5000 every 8 hours  hydrALAZINE 25 every 8 hours  insulin glargine Injectable (LANTUS) 12 at bedtime  insulin lispro (ADMELOG) corrective regimen sliding scale  three times a day before meals  insulin lispro (ADMELOG) corrective regimen sliding scale  at bedtime  insulin lispro Injectable (ADMELOG) 3 three times a day before meals  levothyroxine 50 daily  melatonin 6 at bedtime  mycophenolate mofetil 250 <User Schedule>  pantoprazole    Tablet 40 <User Schedule>  polyethylene glycol 3350 17 daily  predniSONE   Tablet 20 daily  senna 2 at bedtime  sirolimus 2.5 <User Schedule>      Vital Signs Last 24 Hrs  T(F): 99.1 (08-11-22 @ 08:00), Max: 100.9 (08-08-22 @ 02:50)    Vital Signs Last 24 Hrs  HR: 68 (08-11-22 @ 08:00) (63 - 74)  BP: 134/63 (08-11-22 @ 08:00) (130/60 - 189/76)  RR: 22 (08-11-22 @ 08:00)  SpO2: 98% (08-11-22 @ 08:00) (96% - 100%)  Wt(kg): --    EXAM:    Constitutional:  well preserved, comfortable  Head/Eyes: no icterus, PERRL, EOMI  ENT:  supple; no thrush  LUNGS:  CTA +L chest wall HD catheter c/d/i  CVS:  normal S1, S2, no murmur  Abd:  soft, non-tender; non-distended +RLQ skin erythema, induration, TTP, swelling extending to R groin  Ext:  no edema  Vascular:  IV site no erythema tenderness or discharge  MSK:  joints without swelling  Neuro: AAO X 3, non- focal    Labs:                        7.7    18.47 )-----------( 340      ( 11 Aug 2022 05:38 )             24.4     08-11    134<L>  |  94<L>  |  52<H>  ----------------------------<  88  3.7   |  24  |  3.48<H>    Ca    8.6      11 Aug 2022 05:37  Phos  3.3     08-11  Mg     2.3     08-11    TPro  5.4<L>  /  Alb  2.0<L>  /  TBili  0.2  /  DBili  0.1  /  AST  13  /  ALT  10  /  AlkPhos  227<H>  08-11      WBC Trend:  WBC Count: 18.47 (08-11-22 @ 05:38)  WBC Count: 20.62 (08-10-22 @ 05:57)  WBC Count: 22.22 (08-09-22 @ 05:52)  WBC Count: 19.54 (08-08-22 @ 05:42)    Creatine Trend  Creatinine, Serum: 3.48 (08-11)  Creatinine, Serum: 2.71 (08-10)  Creatinine, Serum: 4.25 (08-10)  Creatinine, Serum: 3.70 (08-09)    Liver Biochemical Testing Trend:  Alanine Aminotransferase (ALT/SGPT): 10 (08-11)  Alanine Aminotransferase (ALT/SGPT): 12 (08-10)  Alanine Aminotransferase (ALT/SGPT): 14 (08-09)  Alanine Aminotransferase (ALT/SGPT): 15 (08-08)  Alanine Aminotransferase (ALT/SGPT): 19 (08-07)  Aspartate Aminotransferase (AST/SGOT): 13 (08-11-22 @ 05:37)  Aspartate Aminotransferase (AST/SGOT): 16 (08-10-22 @ 05:57)  Aspartate Aminotransferase (AST/SGOT): 16 (08-09-22 @ 05:54)  Aspartate Aminotransferase (AST/SGOT): 18 (08-08-22 @ 05:42)  Aspartate Aminotransferase (AST/SGOT): 20 (08-07-22 @ 06:05)  Bilirubin Direct, Serum: 0.1 (08-11)  Bilirubin Total, Serum: 0.2 (08-11)  Bilirubin Direct, Serum: 0.1 (08-10)  Bilirubin Total, Serum: 0.2 (08-10)  Bilirubin Direct, Serum: 0.2 (08-09)    Trend LD  07-30-22 @ 13:02  785<H>  07-25-22 @ 06:30  655<H>  06-11-22 @ 18:47  223  05-02-22 @ 10:05  246<H>  04-30-22 @ 07:15  260<H>    MICROBIOLOGY:  Vancomycin Level, Random: 14.1 (08-11 @ 05:37)  Vancomycin Level, Random: 16.5 (08-10 @ 05:57)  Vancomycin Level, Random: 19.9 (08-09 @ 09:15)  Vancomycin Level, Random: 13.5 (08-08 @ 05:42)  Vancomycin Level, Random: 15.6 (08-07 @ 06:05)    MRSA PCR Result.: NotDetec (07-26-22 @ 03:47)  MRSA PCR Result.: NotDetec (07-13-22 @ 21:02)  MRSA PCR Result.: NotDetec (06-26-22 @ 11:25)      Culture - Blood (collected 09 Aug 2022 18:32)  Source: .Blood Blood-Peripheral  Preliminary Report:    No growth to date.    Culture - Blood (collected 09 Aug 2022 18:32)  Source: .Blood Blood-Peripheral  Preliminary Report:    No growth to date.    Culture - Tissue with Gram Stain (collected 08 Aug 2022 17:44)  Source: .Tissue Other  Preliminary Report:    No growth    Culture - Acid Fast - Tissue w/Smear (collected 08 Aug 2022 17:44)  Source: .Tissue Other  Preliminary Report:    Culture is being performed.    Culture - Fungal, Tissue (collected 08 Aug 2022 17:44)  Source: .Tissue Other  Preliminary Report:    Testing in progress    Culture - Blood (collected 08 Aug 2022 04:01)  Source: .Blood Blood-Peripheral  Preliminary Report:    No growth to date.    Culture - Blood (collected 08 Aug 2022 04:01)  Source: .Blood Blood-Peripheral  Preliminary Report:    Growth in aerobic bottle: Staphylococcus hominis    Coag Negative Staphylococcus    Single set isolate, possible contaminant. Contact    Microbiology if susceptibility testing clinically    indicated.    ***Blood Panel PCR results on this specimen are available    approximately 3 hours after the Gram stain result.***    Gram stain, PCR, and/or culture results may not always    correspond due to difference in methodologies.    ************************************************************    This PCR assay was performed by multiplex PCR. This    Assay tests for 66 bacterial and resistance gene targets.    Please refer to the [facility] test directory    at [email or web address] for details.  Organism: Blood Culture PCR  Organism: Blood Culture PCR    Sensitivities:      -  Staphylococcus epidermidis, Methicillin resistant: Detec      Method Type: PCR    Culture - Blood (collected 03 Aug 2022 23:46)  Source: .Blood Blood  Final Report:    No Growth Final    Culture - Blood (collected 03 Aug 2022 23:46)  Source: .Blood Blood  Final Report:    No Growth Final    Culture - Blood (collected 30 Jul 2022 06:19)  Source: .Blood Blood-Peripheral  Final Report:    No Growth Final    HIV-1/2 Combo Result: Nonreact (04-21-22 @ 14:47)    COVID-19 PCR: NotDetec (08-07-22 @ 18:05)  COVID-19 PCR: NotDetec (07-25-22 @ 11:51)  COVID-19 PCR: NotDetec (07-23-22 @ 07:55)  COVID-19 PCR: NotDetec (07-17-22 @ 07:54)  COVID-19 PCR: NotDetec (07-10-22 @ 16:11)  COVID-19 PCR: NotDetec (07-05-22 @ 06:03)  COVID-19 PCR: NotDetec (06-23-22 @ 07:01)  COVID-19 PCR: NotDetec (06-14-22 @ 12:44)      Procalcitonin, Serum: 0.94 (08-11)  Procalcitonin, Serum: 0.97 (08-10)  Procalcitonin, Serum: 0.95 (08-09)  Procalcitonin, Serum: 0.96 (08-08)  Procalcitonin, Serum: 1.11 (08-07)  Procalcitonin, Serum: 1.18 (08-06)  Procalcitonin, Serum: 1.31 (08-05)    C-Reactive Protein, Serum: 302 (08-08)    Blood Gas Venous - Lactate: 0.8 (08-10 @ 05:45)  Blood Gas Venous - Lactate: 1.3 (08-08 @ 23:27)             Follow Up:  Patient has no complaints. States that RLL rash is someone improved, but cannot say in what way.     Interval History/ROS:Patient is a 66y old  Male who presents with a chief complaint of Status Epilepticus (11 Aug 2022 02:48)    REVIEW OF SYSTEMS  [ x ] All other systems negative except as noted below    Constitutional:  [ ] fever [ ] chills  [ ] weight loss  [ ]night sweat  [ ]poor appetite/PO intake [ ]fatigue   Skin:  [ ] rash [ ] phlebitis	  Eyes: [ ] icterus [ ] pain  [ ] discharge	  ENMT: [ ] sore throat  [ ] thrush [ ] ulcers [ ] exudates [ ]anosmia  Respiratory: [ ] dyspnea [ ] hemoptysis [ ] cough [ ] sputum	  Cardiovascular:  [ ] chest pain [ ] palpitations [ ] edema	  Gastrointestinal:  [ ] nausea [ ] vomiting [ ] diarrhea [ ] constipation [ ] pain	  Genitourinary:  [ ] dysuria [ ] frequency [ ] hematuria [ ] discharge [ ] flank pain  [ ] incontinence  Musculoskeletal:  [ ] myalgias [ ] arthralgias [ ] arthritis  [ ] back pain  Neurological:  [ ] headache [ ] weakness [ ] seizures  [ ] confusion/altered mental status    Allergies  No Known Allergies    ANTIMICROBIALS:    fluconAZOLE   Tablet 400 daily  trimethoprim   80 mG/sulfamethoxazole 400 mG 1 daily  valGANciclovir 450     OTHER MEDS: MEDICATIONS  (STANDING):  acetaminophen     Tablet .. 650 every 6 hours PRN  amLODIPine   Tablet 10 daily  doxazosin 4 <User Schedule>  epoetin cortney-epbx (RETACRIT) Injectable 82117 every 7 days  finasteride 5 daily  fosphenytoin IVPB 200 every 12 hours  heparin   Injectable 5000 every 8 hours  hydrALAZINE 25 every 8 hours  insulin glargine Injectable (LANTUS) 12 at bedtime  insulin lispro (ADMELOG) corrective regimen sliding scale  three times a day before meals  insulin lispro (ADMELOG) corrective regimen sliding scale  at bedtime  insulin lispro Injectable (ADMELOG) 3 three times a day before meals  levothyroxine 50 daily  melatonin 6 at bedtime  mycophenolate mofetil 250 <User Schedule>  pantoprazole    Tablet 40 <User Schedule>  polyethylene glycol 3350 17 daily  predniSONE   Tablet 20 daily  senna 2 at bedtime  sirolimus 2.5 <User Schedule>      Vital Signs Last 24 Hrs  T(F): 99.1 (08-11-22 @ 08:00), Max: 100.9 (08-08-22 @ 02:50)    Vital Signs Last 24 Hrs  HR: 68 (08-11-22 @ 08:00) (63 - 74)  BP: 134/63 (08-11-22 @ 08:00) (130/60 - 189/76)  RR: 22 (08-11-22 @ 08:00)  SpO2: 98% (08-11-22 @ 08:00) (96% - 100%)  Wt(kg): --    EXAM:    Constitutional:  well preserved, comfortable  Head/Eyes: no icterus, PERRL, EOMI  ENT:  supple; no thrush  LUNGS:  CTA +L chest wall HD catheter c/d/i  CVS:  normal S1, S2, no murmur  Abd:  soft, non-tender; non-distended +RLQ skin erythema, induration, TTP, swelling extending to R groin  Ext:  no edema  Vascular:  IV site no erythema tenderness or discharge  MSK:  joints without swelling  Neuro: AAO X 3, non- focal    Labs:                        7.7    18.47 )-----------( 340      ( 11 Aug 2022 05:38 )             24.4     08-11    134<L>  |  94<L>  |  52<H>  ----------------------------<  88  3.7   |  24  |  3.48<H>    Ca    8.6      11 Aug 2022 05:37  Phos  3.3     08-11  Mg     2.3     08-11    TPro  5.4<L>  /  Alb  2.0<L>  /  TBili  0.2  /  DBili  0.1  /  AST  13  /  ALT  10  /  AlkPhos  227<H>  08-11      WBC Trend:  WBC Count: 18.47 (08-11-22 @ 05:38)  WBC Count: 20.62 (08-10-22 @ 05:57)  WBC Count: 22.22 (08-09-22 @ 05:52)  WBC Count: 19.54 (08-08-22 @ 05:42)    Creatine Trend  Creatinine, Serum: 3.48 (08-11)  Creatinine, Serum: 2.71 (08-10)  Creatinine, Serum: 4.25 (08-10)  Creatinine, Serum: 3.70 (08-09)    Liver Biochemical Testing Trend:  Alanine Aminotransferase (ALT/SGPT): 10 (08-11)  Alanine Aminotransferase (ALT/SGPT): 12 (08-10)  Alanine Aminotransferase (ALT/SGPT): 14 (08-09)  Alanine Aminotransferase (ALT/SGPT): 15 (08-08)  Alanine Aminotransferase (ALT/SGPT): 19 (08-07)  Aspartate Aminotransferase (AST/SGOT): 13 (08-11-22 @ 05:37)  Aspartate Aminotransferase (AST/SGOT): 16 (08-10-22 @ 05:57)  Aspartate Aminotransferase (AST/SGOT): 16 (08-09-22 @ 05:54)  Aspartate Aminotransferase (AST/SGOT): 18 (08-08-22 @ 05:42)  Aspartate Aminotransferase (AST/SGOT): 20 (08-07-22 @ 06:05)  Bilirubin Direct, Serum: 0.1 (08-11)  Bilirubin Total, Serum: 0.2 (08-11)  Bilirubin Direct, Serum: 0.1 (08-10)  Bilirubin Total, Serum: 0.2 (08-10)  Bilirubin Direct, Serum: 0.2 (08-09)    Trend LD  07-30-22 @ 13:02  785<H>  07-25-22 @ 06:30  655<H>  06-11-22 @ 18:47  223  05-02-22 @ 10:05  246<H>  04-30-22 @ 07:15  260<H>    MICROBIOLOGY:  Vancomycin Level, Random: 14.1 (08-11 @ 05:37)  Vancomycin Level, Random: 16.5 (08-10 @ 05:57)  Vancomycin Level, Random: 19.9 (08-09 @ 09:15)  Vancomycin Level, Random: 13.5 (08-08 @ 05:42)  Vancomycin Level, Random: 15.6 (08-07 @ 06:05)    MRSA PCR Result.: NotDetec (07-26-22 @ 03:47)  MRSA PCR Result.: NotDetec (07-13-22 @ 21:02)  MRSA PCR Result.: NotDetec (06-26-22 @ 11:25)      Culture - Blood (collected 09 Aug 2022 18:32)  Source: .Blood Blood-Peripheral  Preliminary Report:    No growth to date.    Culture - Blood (collected 09 Aug 2022 18:32)  Source: .Blood Blood-Peripheral  Preliminary Report:    No growth to date.    Culture - Tissue with Gram Stain (collected 08 Aug 2022 17:44)  Source: .Tissue Other  Preliminary Report:    No growth    Culture - Acid Fast - Tissue w/Smear (collected 08 Aug 2022 17:44)  Source: .Tissue Other  Preliminary Report:    Culture is being performed.    Culture - Fungal, Tissue (collected 08 Aug 2022 17:44)  Source: .Tissue Other  Preliminary Report:    Testing in progress    Culture - Blood (collected 08 Aug 2022 04:01)  Source: .Blood Blood-Peripheral  Preliminary Report:    No growth to date.    Culture - Blood (collected 08 Aug 2022 04:01)  Source: .Blood Blood-Peripheral  Preliminary Report:    Growth in aerobic bottle: Staphylococcus hominis    Coag Negative Staphylococcus    Single set isolate, possible contaminant. Contact    Microbiology if susceptibility testing clinically    indicated.    ***Blood Panel PCR results on this specimen are available    approximately 3 hours after the Gram stain result.***    Gram stain, PCR, and/or culture results may not always    correspond due to difference in methodologies.    ************************************************************    This PCR assay was performed by multiplex PCR. This    Assay tests for 66 bacterial and resistance gene targets.    Please refer to the [facility] test directory    at [email or web address] for details.  Organism: Blood Culture PCR  Organism: Blood Culture PCR    Sensitivities:      -  Staphylococcus epidermidis, Methicillin resistant: Detec      Method Type: PCR    Culture - Blood (collected 03 Aug 2022 23:46)  Source: .Blood Blood  Final Report:    No Growth Final    Culture - Blood (collected 03 Aug 2022 23:46)  Source: .Blood Blood  Final Report:    No Growth Final    Culture - Blood (collected 30 Jul 2022 06:19)  Source: .Blood Blood-Peripheral  Final Report:    No Growth Final    HIV-1/2 Combo Result: Nonreact (04-21-22 @ 14:47)    COVID-19 PCR: NotDetec (08-07-22 @ 18:05)  COVID-19 PCR: NotDetec (07-25-22 @ 11:51)  COVID-19 PCR: NotDetec (07-23-22 @ 07:55)  COVID-19 PCR: NotDetec (07-17-22 @ 07:54)  COVID-19 PCR: NotDetec (07-10-22 @ 16:11)  COVID-19 PCR: NotDetec (07-05-22 @ 06:03)  COVID-19 PCR: NotDetec (06-23-22 @ 07:01)  COVID-19 PCR: NotDetec (06-14-22 @ 12:44)      Procalcitonin, Serum: 0.94 (08-11)  Procalcitonin, Serum: 0.97 (08-10)  Procalcitonin, Serum: 0.95 (08-09)  Procalcitonin, Serum: 0.96 (08-08)  Procalcitonin, Serum: 1.11 (08-07)  Procalcitonin, Serum: 1.18 (08-06)  Procalcitonin, Serum: 1.31 (08-05)    C-Reactive Protein, Serum: 302 (08-08)    Blood Gas Venous - Lactate: 0.8 (08-10 @ 05:45)  Blood Gas Venous - Lactate: 1.3 (08-08 @ 23:27)    RADIOLOGY    <The imaging below has been reviewed and visualized by me independently. Findings as detailed in report below>    < from: [facility] Duplex Lower Extrem Arterial, Bilat (08.10.22 @ 13:09) >  Impression: Lower extremity arterial vascular disease is NOT identified.    < end of copied text >

## 2022-08-11 NOTE — PROGRESS NOTE ADULT - ASSESSMENT
66 year old male with PMH of  DM type II, HTN, CAD s/p CABG in 2020, Afib on Eliquis, CVA in 2019 due to Afib, Seizure d/o (last episode was on 4/8/22), h/o GI bleed in 2/2022 EGD with duodenal ulcer and ESRD on HD s/p R DDRT from DCD donor on 4/21/22 complicated by DGF requiring HD until 4/29/22, later had good graft function who was readmitted with status epilepticus in setting of UTI/antibiotics and sub-therapeutic valproic acid level.  Transferred to SICU on 7/25 with signs of sepsis. 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 Received pulse steroids (Solumedrol 500 on 7/30 -> 250 on 7/31).     1. 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 No  DSA.   s/p pulse dose steroids. Thymo was not given to treat the rejection as he is too frail and at increased risk for infections. Now with failed allograft function, Remains anuric and is dialysis dependent.   Pt underwent IR guided HD catheter exchange on 8/8/22 Last HD done on 8/10 with UF of 3L. Pt with volume overload and anuric. Plan for HD with goal of 3 kgs. Monitor for labs and urineoutput. Bladder ray prn.      2. IS meds- was on Belatacept. now on Sirolimus ,  mg po bid and steroid taper.     3. AMS , seizures - had 3 episodes of seizures on 8/3. CT head on 8/3 was negative. On Fosphenytoin. No further episodes of seizures since 8/3. Neurology following.    4. DM -  on Lantus and lispro.     5. Cellulitis over RLQ - WBC increased to 22K today. On Vancomycin . CT A/P on 8/6 revealed a retroperitoneal hematoma. Txp USG 8/6  - Patent renal artery vasculature. Stable appearance the transplant kidney .Persistent elevated resistive indices  Diffuse abdominal wall edematous changes right lower quadrant without focal collection, cultures from 8/3  negative so far. Skin bx done on 8/8/22 showed no significant findings. ID follow up. Follow Fungitell,. Continue Fluconazole. Monitor fevers and labs.    6.  ESBL klebsiella UTI (7/15 + UCx, 7/25 +UCx)- completed a course of  Ertapenam on  8/3 + Fluconazole.    7. LE edema: Pt with B/L LE/UE swelling R>L, doppler studies negative of DVT. Continue Subq Heparin. Cannot use Eliquis given interaction with Dilantin.

## 2022-08-11 NOTE — PROGRESS NOTE ADULT - ASSESSMENT
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 In dermatology there are reports of privileged localization of cutaneous lesions on injured skin which, therefore, represents a typical condition  - Surrounding previous transplant incision site  - Favor an infectious etiology, in the setting fo immunosuppression -- patient has already been covered on broad spectrum abx  - H&E showed non-specific findings that could be c/w cellulitis  - F/u  tissue culture for bacterial/fungal/AFB     The patient's chart was reviewed in addition to being seen and examined at bedside with the dermatology attending.  Recommendations were communicated with the primary team.  Please page [phone] with a 10-digit call-back number for further related questions.    Heri Guan MD  Resident Physician, PGY-3  [facility]  Pager: [phone]  Office: [phone]   #Skin plaque, favor cellulitis - improving  -There may be a component of Locus minoris resistentiae refers to a body region more vulnerable than others. In dermatology there are reports of privileged localization of cutaneous lesions on injured skin which, therefore, represents a typical condition  - Surrounding previous transplant incision site  - Favor an infectious etiology, in the setting fo immunosuppression -- patient has already been covered on broad spectrum abx  - H&E showed non-specific findings that could be c/w cellulitis  - F/u  tissue culture for bacterial/fungal/AFB   - Recommend if available: a scrotal edema sling to help with the scrotal edema    The patient's chart was reviewed in addition to being seen and examined at bedside with the dermatology attending.  Recommendations were communicated with the primary team.  Please page [phone] with a 10-digit call-back number for further related questions.    Heri Guan MD  Resident Physician, PGY-3  [facility]ermat[facility]  Pager: [phone]  Office: [phone]

## 2022-08-11 NOTE — PROGRESS NOTE ADULT - ASSESSMENT
WORK UP  s/p LP which was not suggestive of infectious process  U/A (7/12) 161 WBC  UCx (7/13) 50-99K ESBL Klebsiella   s/p 3 days of Ertapenem  UA (7/25) with 40 WBC  UCx (7/25) 50-99k ESBL Klebsiella     RUQ (7/25) with hepatomegaly   CT c/a/p (7/25) with only small perinephric fluid collection, small volume ascites.  Doppler US R Kidney (7/27) with mild thickening of collecting system and ureter and small   Doppler US R Kidney (7/29) with fullness of collecting system and continued urothelial thickening.     RVP (7/25) Negative  CMV PCR (7/22) Negative  EBV PCR (7/25) Negative   HBV PCR (7/25) Negative   HHV-6 PCR (7/25) POSITIVE   Parvovirus IgM and PCR (7/26) Negative   HSV 1/2 PCR (7/26) Negative   Adenovirus PCR (7/26) Negative    CT A/P (8/5) with No drainable fluid collections and Right psoas retroperitoneal hematoma.    DIAGNOSIS and IMPRESSION  66M with ESRD s/p PermCath removal 5/10/22 s/p Rt DDRT 4/21/22,  CVA (2019), Afib on apixaban, seizure activity, CAD s/p stents, CABG (2020), HTN, HLD, DM on insulin, gastric/duodenal ulcer, recent hospitalization 4/28/22 -5/10/22 with weakness/anemia found to have perinephric hematoma requiring evacuation and repair of bleeding arterial anastomosis admitted 6/10 for status epilepticus requiring intubation for hypoxic respiratory failure.     #Rash, Erythema overlying Renal Transplant  #Leukocytosis   #ESRD s/p DDRT (4/21/2022)  #Status epilepticus     RECOMMENDATIONS  - afebrile, no seizures. leukocytosis stable  - BCx (8/8) with CoNS 1 out of 4   - BCx (8/9) NGTD  - Surgical Path (8/8) nonspecific findings including edema, telangiectasia and sparse mixed infiltrate of lymphocytes and neutrophils, culture prelim negative, AFB negative. Findings are nonspecific ?Cellulitis  - c/w Vancomycin by level, last trough 14.1 (8/11) which is acceptable for SSI  - c/w Valgancyclovir and Bactrim for ppx  - Derm recs appreciated   - Recommend IR Biopsy of underlying muscle for higher yield       PT TO BE SEEN. PRELIM NOTE  PENDING RECS. PLEASE WAIT FOR FINAL RECS AFTER DISCUSSION WITH ATTENDING#    Rogers Horton DO, PGY-4   ID fellow  [employer] Teams Preferred  After 5pm/weekends call [phone]

## 2022-08-11 NOTE — PROGRESS NOTE ADULT - SUBJECTIVE AND OBJECTIVE BOX
[facility] DIVISION OF KIDNEY DISEASES AND HYPERTENSION   FOLLOW UP NOTE    --------------------------------------------------------------------------------  Chief Complaint: 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 was seen and examined today. Overnight events  noted. Pt continues to remains volume overload. Last Hd done on 8/10/22.      PAST HISTORY  --------------------------------------------------------------------------------  No significant changes to PMH, PSH, FHx, SHx, unless otherwise noted    ALLERGIES & MEDICATIONS  --------------------------------------------------------------------------------  Allergies    No Known Allergies    Intolerances      Standing Inpatient Medications  amLODIPine   Tablet 10 milliGRAM(s) Oral daily  chlorhexidine 2% Cloths 1 Application(s) Topical <User Schedule>  doxazosin 4 milliGRAM(s) Oral <User Schedule>  epoetin cortney-epbx (RETACRIT) Injectable 96471 Unit(s) SubCutaneous every 7 days  finasteride 5 milliGRAM(s) Oral daily  fosphenytoin IVPB 200 milliGRAM(s) PE IV Intermittent every 12 hours  heparin   Injectable 5000 Unit(s) SubCutaneous every 8 hours  hydrALAZINE 25 milliGRAM(s) Oral every 8 hours  insulin glargine Injectable (LANTUS) 9 Unit(s) SubCutaneous at bedtime  insulin lispro (ADMELOG) corrective regimen sliding scale   SubCutaneous three times a day before meals  insulin lispro (ADMELOG) corrective regimen sliding scale   SubCutaneous at bedtime  levothyroxine 50 MICROGram(s) Oral daily  melatonin 6 milliGRAM(s) Oral at bedtime  mycophenolate mofetil 250 milliGRAM(s) Oral <User Schedule>  nystatin    Suspension 002833 Unit(s) Oral four times a day  pantoprazole    Tablet 40 milliGRAM(s) Oral <User Schedule>  petrolatum white Ointment 1 Application(s) Topical two times a day  polyethylene glycol 3350 17 Gram(s) Oral daily  predniSONE   Tablet 20 milliGRAM(s) Oral daily  senna 2 Tablet(s) Oral at bedtime  sirolimus 1 milliGRAM(s) Oral once  trimethoprim   80 mG/sulfamethoxazole 400 mG 1 Tablet(s) Oral daily  valGANciclovir 450 milliGRAM(s) Oral <User Schedule>  vancomycin  IVPB 1000 milliGRAM(s) IV Intermittent once    PRN Inpatient Medications  acetaminophen     Tablet .. 650 milliGRAM(s) Oral every 6 hours PRN      REVIEW OF SYSTEMS  --------------------------------------------------------------------------------  Gen: anasarca+  Head/Eyes/Ears: No HA   Respiratory: No dyspnea, cough  CV: No chest pain  GI: No abdominal pain, diarrhea, as per hPI  : No dysuria, hematuria  MSK: LE edema+  Skin: No rashes  Heme: No easy bruising or bleeding    All other systems were reviewed and are negative, except as noted.    VITALS/PHYSICAL EXAM  --------------------------------------------------------------------------------  T(C): 36.9 (08-11-22 @ 13:10), Max: 37.6 (08-11-22 @ 03:00)  HR: 69 (08-11-22 @ 14:00) (64 - 74)  BP: 165/72 (08-11-22 @ 14:00) (117/59 - 189/76)  RR: 21 (08-11-22 @ 14:00) (13 - 27)  SpO2: 98% (08-11-22 @ 14:00) (94% - 100%)  Wt(kg): --      08-10-22 @ 07:01  -  08-11-22 @ 07:00  --------------------------------------------------------  IN: 1554 mL / OUT: 3500 mL / NET: -1946 mL    08-11-22 @ 07:01  -  08-11-22 @ 14:52  --------------------------------------------------------  IN: 0 mL / OUT: 100 mL / NET: -100 mL      Physical Exam:  	Gen: weak appearing elderly male  	HEENT: Anicteric  	Pulm: CTA B/L  	CV: S1S2+  	Abd: Soft, +BS               Transplant site: RLQ non tender, surrounding erythema over the transplant site+,  	Ext: LE edema B/L++  	Neuro: Awake  	Skin: Warm and dry  	Dialysis access: Left non tunneled IJ catheter+, site clear, no bleeding noted    LABS/STUDIES  --------------------------------------------------------------------------------              8.5    22.34 >-----------<  339      [08-11-22 @ 12:23]              26.3     134  |  94  |  52  ----------------------------<  88      [08-11-22 @ 05:37]  3.7   |  24  |  3.48        Ca     8.6     [08-11-22 @ 05:37]      Mg     2.3     [08-11-22 @ 05:37]      Phos  3.3     [08-11-22 @ 05:37]    Creatinine Trend:  SCr 3.48 [08-11 @ 05:37]  SCr 2.71 [08-10 @ 17:30]  SCr 4.25 [08-10 @ 05:57]  SCr 3.70 [08-09 @ 17:58]  SCr 3.43 [08-09 @ 05:54]    Urinalysis - [07-25-22 @ 18:28]      Color Yellow / Appearance Slightly Turbid / SG 1.020 / pH 5.5      Gluc Negative / Ketone Negative  / Bili Negative / Urobili Negative       Blood Small / Protein 100 / Leuk Est Large / Nitrite Negative      RBC 2 / WBC 40 / Hyaline 5 / Gran  / Sq Epi  / Non Sq Epi 0 / Bacteria Many      HBsAg Nonreact      [07-25-22 @ 11:51]  HCV 0.18, Nonreact      [07-25-22 @ 11:51]      Tacrolimus  Cyclosporine  SirolimusSirolimus (Rapamycin) Level, Serum: 3.6 ng/mL (08-11 @ 05:38)  Sirolimus (Rapamycin) Level, Serum: 3.8 ng/mL (08-10 @ 05:57)  Sirolimus (Rapamycin) Level, Serum: 3.8 ng/mL (08-09 @ 05:52)  Sirolimus (Rapamycin) Level, Serum: 4.0 ng/mL (08-08 @ 05:42)  Sirolimus (Rapamycin) Level, Serum: 3.5 ng/mL (08-07 @ 06:05)  Sirolimus (Rapamycin) Level, Serum: 3.4 ng/mL (08-06 @ 05:37)  Sirolimus (Rapamycin) Level, Serum: 3.4 ng/mL (08-05 @ 04:17)  Sirolimus (Rapamycin) Level, Serum: 5.0 ng/mL (08-03 @ 22:24)  Sirolimus (Rapamycin) Level, Serum: 6.1 ng/mL (08-02 @ 23:18)  Sirolimus (Rapamycin) Level, Serum: 6.7 ng/mL (08-01 @ 23:41)    CMV PCRCMVPCR Log: NotDetec Nbd04LL/mL (07-22 @ 06:18)  CMVPCR Log: NotDetec Cuq15UC/mL (06-26 @ 18:14)  CMVPCR Log: NotDetec Owd77PM/mL (06-25 @ 14:08)    Parvo PCRParvovirus B19 DNA by PCR: NotDetec IU/mL (07-25 @ 18:32)  Parvovirus B19 DNA by PCR: NotDetec IU/mL (06-26 @ 18:14)

## 2022-08-11 NOTE — PROGRESS NOTE ADULT - SUBJECTIVE AND OBJECTIVE BOX
seen earlier today     Chief Complaint: Type 2 Diabetes Mellitus     INTERVAL HX: 2am BG not checked  pt hypoglycemic to 61 this am, noted received lantus 12 units last night , treated w/ 1/2 amp d50% per sicu team & ate breakfast & admelog coverage held per team. d/w RN pt tolerating po w/ assistance . premeal discontinued by primary team. spoke with primary team on floor would continue premeal if pt eating to prevent rebound hyperglycemia.  noted pt ordered for vanco in d5 today which will increase BG as well       Review of Systems:  General: As above  Cardiovascular: No chest pain  Respiratory: No SOB  GI: No nausea, vomiting  Endocrine: no  S&Sx of hypoglycemia    Allergies    No Known Allergies    Intolerances      MEDICATIONS  (STANDING):  amLODIPine   Tablet 10 milliGRAM(s) Oral daily  chlorhexidine 2% Cloths 1 Application(s) Topical <User Schedule>  doxazosin 4 milliGRAM(s) Oral <User Schedule>  epoetin cortney-epbx (RETACRIT) Injectable 55341 Unit(s) SubCutaneous every 7 days  finasteride 5 milliGRAM(s) Oral daily  fosphenytoin IVPB 200 milliGRAM(s) PE IV Intermittent every 12 hours  heparin   Injectable 5000 Unit(s) SubCutaneous every 8 hours  hydrALAZINE 25 milliGRAM(s) Oral every 8 hours  insulin glargine Injectable (LANTUS) 12 Unit(s) SubCutaneous at bedtime  insulin lispro (ADMELOG) corrective regimen sliding scale   SubCutaneous three times a day before meals  insulin lispro (ADMELOG) corrective regimen sliding scale   SubCutaneous at bedtime  levothyroxine 50 MICROGram(s) Oral daily  melatonin 6 milliGRAM(s) Oral at bedtime  mycophenolate mofetil 250 milliGRAM(s) Oral <User Schedule>  nystatin    Suspension 803446 Unit(s) Oral four times a day  pantoprazole    Tablet 40 milliGRAM(s) Oral <User Schedule>  petrolatum white Ointment 1 Application(s) Topical two times a day  polyethylene glycol 3350 17 Gram(s) Oral daily  predniSONE   Tablet 20 milliGRAM(s) Oral daily  senna 2 Tablet(s) Oral at bedtime  sirolimus 1 milliGRAM(s) Oral once  trimethoprim   80 mG/sulfamethoxazole 400 mG 1 Tablet(s) Oral daily  valGANciclovir 450 milliGRAM(s) Oral <User Schedule>  vancomycin  IVPB 1000 milliGRAM(s) IV Intermittent once        dextrose 50% Injectable   25 milliLiter(s) IV Push (08-11-22 @ 08:38)    finasteride   5 milliGRAM(s) Oral (08-11-22 @ 12:28)    insulin glargine Injectable (LANTUS)   12 Unit(s) SubCutaneous (08-10-22 @ 21:17)    insulin lispro (ADMELOG) corrective regimen sliding scale   4 Unit(s) SubCutaneous (08-11-22 @ 12:27)   2 Unit(s) SubCutaneous (08-10-22 @ 17:12)    insulin lispro Injectable (ADMELOG)   3 Unit(s) SubCutaneous (08-10-22 @ 17:12)    levothyroxine   50 MICROGram(s) Oral (08-11-22 @ 05:50)    predniSONE   Tablet   20 milliGRAM(s) Oral (08-11-22 @ 05:51)        PHYSICAL EXAM:  VITALS: T(C): 36.9 (08-11-22 @ 13:10)  T(F): 98.4 (08-11-22 @ 13:10), Max: 99.7 (08-11-22 @ 03:00)  HR: 70 (08-11-22 @ 13:10) (64 - 74)  BP: 152/69 (08-11-22 @ 13:10) (117/59 - 189/76)  RR:  (13 - 27)  SpO2:  (94% - 100%)  Wt(kg): --  GENERAL: male laying in bed in NAD  Respiratory: Respirations unlabored   Extremities: Warm  NEURO: Alert  calm    LABS:    POCT Blood Glucose.: 219 mg/dL (08-11-22 @ 12:14)  POCT Blood Glucose.: 155 mg/dL (08-11-22 @ 09:38)  POCT Blood Glucose.: 61 mg/dL (08-11-22 @ 08:07)  POCT Blood Glucose.: 194 mg/dL (08-10-22 @ 21:16)  POCT Blood Glucose.: 173 mg/dL (08-10-22 @ 17:09)  POCT Blood Glucose.: 158 mg/dL (08-10-22 @ 11:25)  POCT Blood Glucose.: 115 mg/dL (08-10-22 @ 07:30)  POCT Blood Glucose.: 194 mg/dL (08-09-22 @ 21:49)  POCT Blood Glucose.: 229 mg/dL (08-09-22 @ 17:05)  POCT Blood Glucose.: 193 mg/dL (08-09-22 @ 12:02)  POCT Blood Glucose.: 195 mg/dL (08-09-22 @ 11:08)  POCT Blood Glucose.: 159 mg/dL (08-09-22 @ 10:05)  POCT Blood Glucose.: 137 mg/dL (08-09-22 @ 09:01)  POCT Blood Glucose.: 132 mg/dL (08-09-22 @ 08:24)  POCT Blood Glucose.: 138 mg/dL (08-09-22 @ 07:12)  POCT Blood Glucose.: 143 mg/dL (08-09-22 @ 05:59)  POCT Blood Glucose.: 127 mg/dL (08-09-22 @ 04:59)  POCT Blood Glucose.: 106 mg/dL (08-09-22 @ 03:57)  POCT Blood Glucose.: 118 mg/dL (08-09-22 @ 03:04)  POCT Blood Glucose.: 130 mg/dL (08-09-22 @ 02:02)  POCT Blood Glucose.: 138 mg/dL (08-09-22 @ 00:59)  POCT Blood Glucose.: 150 mg/dL (08-08-22 @ 23:58)  POCT Blood Glucose.: 137 mg/dL (08-08-22 @ 23:05)  POCT Blood Glucose.: 136 mg/dL (08-08-22 @ 21:57)  POCT Blood Glucose.: 125 mg/dL (08-08-22 @ 20:59)  POCT Blood Glucose.: 154 mg/dL (08-08-22 @ 20:02)  POCT Blood Glucose.: 205 mg/dL (08-08-22 @ 18:49)  POCT Blood Glucose.: 262 mg/dL (08-08-22 @ 18:09)  POCT Blood Glucose.: 259 mg/dL (08-08-22 @ 17:28)                            8.5    22.34 )-----------( 339      ( 11 Aug 2022 12:23 )             26.3       08-11    134<L>  |  94<L>  |  52<H>  ----------------------------<  88  3.7   |  24  |  3.48<H>    Ca    8.6      11 Aug 2022 05:37  Phos  3.3     08-11  Mg     2.3     08-11    TPro  5.4<L>  /  Alb  2.0<L>  /  TBili  0.2  /  DBili  0.1  /  AST  13  /  ALT  10  /  AlkPhos  227<H>  08-11      eGFR: 18 mL/min/1.73m2 (11 Aug 2022 05:37)  eGFR: 25 mL/min/1.73m2 (10 Aug 2022 17:30)      07-27 Chol -- Direct LDL -- LDL calculated -- HDL -- Trig 118    Thyroid Function Tests:          A1C with Estimated Average Glucose Result: 6.0 % (06-30-22 @ 05:49)      Estimated Average Glucose: 126 mg/dL (06-30-22 @ 05:49)                          seen earlier today     Chief Complaint: Type 2 Diabetes Mellitus     INTERVAL HX: 2am BG not checked  pt hypoglycemic to 61 this am, denies any symptoms reports did not feel it  noted received lantus 12 units last night , treated w/ 1/2 amp d50% per sicu team & ate breakfast & admelog coverage held per team. d/w RN pt tolerating po w/ assistance . premeal discontinued by primary team. spoke with primary team on floor would continue premeal if pt eating to prevent rebound hyperglycemia.  noted pt ordered for vanco in d5 today which will increase BG as well       Review of Systems:  General: As above  Cardiovascular: No chest pain  Respiratory: No SOB  GI: No nausea, vomiting  Endocrine: no  S&Sx of hypoglycemia with bg 61    Allergies    No Known Allergies    Intolerances      MEDICATIONS  (STANDING):  amLODIPine   Tablet 10 milliGRAM(s) Oral daily  chlorhexidine 2% Cloths 1 Application(s) Topical <User Schedule>  doxazosin 4 milliGRAM(s) Oral <User Schedule>  epoetin cortney-epbx (RETACRIT) Injectable 21862 Unit(s) SubCutaneous every 7 days  finasteride 5 milliGRAM(s) Oral daily  fosphenytoin IVPB 200 milliGRAM(s) PE IV Intermittent every 12 hours  heparin   Injectable 5000 Unit(s) SubCutaneous every 8 hours  hydrALAZINE 25 milliGRAM(s) Oral every 8 hours  insulin glargine Injectable (LANTUS) 12 Unit(s) SubCutaneous at bedtime  insulin lispro (ADMELOG) corrective regimen sliding scale   SubCutaneous three times a day before meals  insulin lispro (ADMELOG) corrective regimen sliding scale   SubCutaneous at bedtime  levothyroxine 50 MICROGram(s) Oral daily  melatonin 6 milliGRAM(s) Oral at bedtime  mycophenolate mofetil 250 milliGRAM(s) Oral <User Schedule>  nystatin    Suspension 166714 Unit(s) Oral four times a day  pantoprazole    Tablet 40 milliGRAM(s) Oral <User Schedule>  petrolatum white Ointment 1 Application(s) Topical two times a day  polyethylene glycol 3350 17 Gram(s) Oral daily  predniSONE   Tablet 20 milliGRAM(s) Oral daily  senna 2 Tablet(s) Oral at bedtime  sirolimus 1 milliGRAM(s) Oral once  trimethoprim   80 mG/sulfamethoxazole 400 mG 1 Tablet(s) Oral daily  valGANciclovir 450 milliGRAM(s) Oral <User Schedule>  vancomycin  IVPB 1000 milliGRAM(s) IV Intermittent once        dextrose 50% Injectable   25 milliLiter(s) IV Push (08-11-22 @ 08:38)    finasteride   5 milliGRAM(s) Oral (08-11-22 @ 12:28)    insulin glargine Injectable (LANTUS)   12 Unit(s) SubCutaneous (08-10-22 @ 21:17)    insulin lispro (ADMELOG) corrective regimen sliding scale   4 Unit(s) SubCutaneous (08-11-22 @ 12:27)   2 Unit(s) SubCutaneous (08-10-22 @ 17:12)    insulin lispro Injectable (ADMELOG)   3 Unit(s) SubCutaneous (08-10-22 @ 17:12)    levothyroxine   50 MICROGram(s) Oral (08-11-22 @ 05:50)    predniSONE   Tablet   20 milliGRAM(s) Oral (08-11-22 @ 05:51)        PHYSICAL EXAM:  VITALS: T(C): 36.9 (08-11-22 @ 13:10)  T(F): 98.4 (08-11-22 @ 13:10), Max: 99.7 (08-11-22 @ 03:00)  HR: 70 (08-11-22 @ 13:10) (64 - 74)  BP: 152/69 (08-11-22 @ 13:10) (117/59 - 189/76)  RR:  (13 - 27)  SpO2:  (94% - 100%)  Wt(kg): --  GENERAL: male laying in bed in NAD  Respiratory: Respirations unlabored   Extremities: Warm  NEURO: Alert  calm    LABS:    POCT Blood Glucose.: 219 mg/dL (08-11-22 @ 12:14)  POCT Blood Glucose.: 155 mg/dL (08-11-22 @ 09:38)  POCT Blood Glucose.: 61 mg/dL (08-11-22 @ 08:07)  POCT Blood Glucose.: 194 mg/dL (08-10-22 @ 21:16)  POCT Blood Glucose.: 173 mg/dL (08-10-22 @ 17:09)  POCT Blood Glucose.: 158 mg/dL (08-10-22 @ 11:25)  POCT Blood Glucose.: 115 mg/dL (08-10-22 @ 07:30)  POCT Blood Glucose.: 194 mg/dL (08-09-22 @ 21:49)  POCT Blood Glucose.: 229 mg/dL (08-09-22 @ 17:05)  POCT Blood Glucose.: 193 mg/dL (08-09-22 @ 12:02)  POCT Blood Glucose.: 195 mg/dL (08-09-22 @ 11:08)  POCT Blood Glucose.: 159 mg/dL (08-09-22 @ 10:05)  POCT Blood Glucose.: 137 mg/dL (08-09-22 @ 09:01)  POCT Blood Glucose.: 132 mg/dL (08-09-22 @ 08:24)  POCT Blood Glucose.: 138 mg/dL (08-09-22 @ 07:12)  POCT Blood Glucose.: 143 mg/dL (08-09-22 @ 05:59)  POCT Blood Glucose.: 127 mg/dL (08-09-22 @ 04:59)  POCT Blood Glucose.: 106 mg/dL (08-09-22 @ 03:57)  POCT Blood Glucose.: 118 mg/dL (08-09-22 @ 03:04)  POCT Blood Glucose.: 130 mg/dL (08-09-22 @ 02:02)  POCT Blood Glucose.: 138 mg/dL (08-09-22 @ 00:59)  POCT Blood Glucose.: 150 mg/dL (08-08-22 @ 23:58)  POCT Blood Glucose.: 137 mg/dL (08-08-22 @ 23:05)  POCT Blood Glucose.: 136 mg/dL (08-08-22 @ 21:57)  POCT Blood Glucose.: 125 mg/dL (08-08-22 @ 20:59)  POCT Blood Glucose.: 154 mg/dL (08-08-22 @ 20:02)  POCT Blood Glucose.: 205 mg/dL (08-08-22 @ 18:49)  POCT Blood Glucose.: 262 mg/dL (08-08-22 @ 18:09)  POCT Blood Glucose.: 259 mg/dL (08-08-22 @ 17:28)                            8.5    22.34 )-----------( 339      ( 11 Aug 2022 12:23 )             26.3       08-11    134<L>  |  94<L>  |  52<H>  ----------------------------<  88  3.7   |  24  |  3.48<H>    Ca    8.6      11 Aug 2022 05:37  Phos  3.3     08-11  Mg     2.3     08-11    TPro  5.4<L>  /  Alb  2.0<L>  /  TBili  0.2  /  DBili  0.1  /  AST  13  /  ALT  10  /  AlkPhos  227<H>  08-11      eGFR: 18 mL/min/1.73m2 (11 Aug 2022 05:37)  eGFR: 25 mL/min/1.73m2 (10 Aug 2022 17:30)      07-27 Chol -- Direct LDL -- LDL calculated -- HDL -- Trig 118    Thyroid Function Tests:          A1C with Estimated Average Glucose Result: 6.0 % (06-30-22 @ 05:49)      Estimated Average Glucose: 126 mg/dL (06-30-22 @ 05:49)

## 2022-08-11 NOTE — PROGRESS NOTE ADULT - SUBJECTIVE AND OBJECTIVE BOX
INTERVAL HPI/OVERNIGHT EVENTS:    S:  Patient is a 66y Male Patient is a 66y old  Male who presents with a chief complaint of Status Epilepticus (09 Aug 2022 12:24)      **No overnight events.**    _______________________________      REVIEW OF SYSTEMS      General: no fevers/chills, no NS	    Skin: see HPI  	  Ophthalmologic: no eye pain or change in vision    Genitourinary: no dysuria or hematuria    Musculoskeletal: no joint pains or weakness	    Neurological:no weakness or tingling        ROS negative except if noted in the above subjective text. All other systems reviewed and negative.    MEDICATIONS  (STANDING):  amLODIPine   Tablet 10 milliGRAM(s) Oral daily  chlorhexidine 2% Cloths 1 Application(s) Topical <User Schedule>  doxazosin 4 milliGRAM(s) Oral <User Schedule>  epoetin cortney-epbx (RETACRIT) Injectable 01958 Unit(s) SubCutaneous every 7 days  finasteride 5 milliGRAM(s) Oral daily  fluconAZOLE   Tablet 400 milliGRAM(s) Oral daily  fosphenytoin IVPB 200 milliGRAM(s) PE IV Intermittent every 12 hours  heparin   Injectable 5000 Unit(s) SubCutaneous every 12 hours  hydrALAZINE 25 milliGRAM(s) Oral every 8 hours  insulin glargine Injectable (LANTUS) 12 Unit(s) SubCutaneous at bedtime  insulin lispro (ADMELOG) corrective regimen sliding scale   SubCutaneous three times a day before meals  insulin lispro (ADMELOG) corrective regimen sliding scale   SubCutaneous at bedtime  levothyroxine 50 MICROGram(s) Oral daily  melatonin 6 milliGRAM(s) Oral at bedtime  mycophenolate mofetil 250 milliGRAM(s) Oral <User Schedule>  pantoprazole    Tablet 40 milliGRAM(s) Oral before breakfast  polyethylene glycol 3350 17 Gram(s) Oral daily  predniSONE   Tablet 20 milliGRAM(s) Oral daily  senna 2 Tablet(s) Oral at bedtime  sirolimus 2.5 milliGRAM(s) Oral <User Schedule>  trimethoprim   80 mG/sulfamethoxazole 400 mG 1 Tablet(s) Oral daily  valGANciclovir 450 milliGRAM(s) Oral <User Schedule>    MEDICATIONS  (PRN):  acetaminophen     Tablet .. 650 milliGRAM(s) Oral every 6 hours PRN Mild Pain (1 - 3)      Allergies    No Known Allergies    Intolerances          O: ICU Vital Signs Last 24 Hrs  T(C): 36.8 (09 Aug 2022 14:15), Max: 37.6 (08 Aug 2022 23:00)  T(F): 98.2 (09 Aug 2022 14:15), Max: 99.7 (08 Aug 2022 23:00)  HR: 61 (09 Aug 2022 14:15) (55 - 72)  BP: 160/68 (09 Aug 2022 14:15) (131/59 - 218/94)  BP(mean): 98 (09 Aug 2022 14:15) (85 - 135)  ABP: --  ABP(mean): --  RR: 13 (09 Aug 2022 14:15) (12 - 26)  SpO2: 96% (09 Aug 2022 14:15) (95% - 100%)    O2 Parameters below as of 09 Aug 2022 07:00  Patient On (Oxygen Delivery Method): room air          I&O's Detail    08 Aug 2022 07:01  -  09 Aug 2022 07:00  --------------------------------------------------------  IN:    Insulin: 20.5 mL    IV PiggyBack: 50 mL    Oral Fluid: 952 mL    Other (mL): 500 mL  Total IN: 1522.5 mL    OUT:    Indwelling Catheter - Urethral (mL): 20 mL    Other (mL): 2500 mL  Total OUT: 2520 mL    Total NET: -997.5 mL      09 Aug 2022 07:01  -  09 Aug 2022 14:46  --------------------------------------------------------  IN:    Insulin: 2.5 mL    Oral Fluid: 500 mL    Other (mL): 500 mL  Total IN: 1002.5 mL    OUT:    Other (mL): 3000 mL  Total OUT: 3000 mL    Total NET: -1997.5 mL          PHYSICAL EXAM:     The patient was alert and oriented X 3, well nourished, and in no  apparent distress.  OP showed no ulcerations  There was no visible lymphadenopathy.  Conjunctiva were non injected  There was no clubbing or edema of extremities.  The scalp, hair, face, eyebrows, lips, OP, neck, chest, back,   extremities X 4, nails were examined.  There was no hyperhidrosis or bromhidrosis.    Of note on skin exam:     indurated well-demarcated plaque erythema on the proximal R thigh, R abdomen  swollen, edematous scrotum and penis  edematous extremities    Labs:                       8.7    22.22 )-----------( 321      ( 09 Aug 2022 05:52 )             26.9     CBC Full  -  ( 09 Aug 2022 05:52 )  WBC Count : 22.22 K/uL  RBC Count : 2.90 M/uL  Hemoglobin : 8.7 g/dL  Hematocrit : 26.9 %  Platelet Count - Automated : 321 K/uL  Mean Cell Volume : 92.8 fl  Mean Cell Hemoglobin : 30.0 pg  Mean Cell Hemoglobin Concentration : 32.3 gm/dL  Auto Neutrophil # : 17.99 K/uL  Auto Lymphocyte # : 0.60 K/uL  Auto Monocyte # : 1.66 K/uL  Auto Eosinophil # : 0.14 K/uL  Auto Basophil # : 0.11 K/uL  Auto Neutrophil % : 81.0 %  Auto Lymphocyte % : 2.7 %  Auto Monocyte % : 7.5 %  Auto Eosinophil % : 0.6 %  Auto Basophil % : 0.5 %    08-09    131<L>  |  94<L>  |  51<H>  ----------------------------<  130<H>  4.0   |  23  |  3.43<H>    Ca    8.3<L>      09 Aug 2022 05:54  Phos  3.6     08-09  Mg     2.0     08-09    TPro  5.6<L>  /  Alb  1.9<L>  /  TBili  0.2  /  DBili  0.2  /  AST  16  /  ALT  14  /  AlkPhos  261<H>  08-09    PT/INR - ( 09 Aug 2022 05:52 )   PT: 14.3 sec;   INR: 1.24 ratio         PTT - ( 09 Aug 2022 05:52 )  PTT:31.3 sec    CAPILLARY BLOOD GLUCOSE  269 (08 Aug 2022 13:50)      POCT Blood Glucose.: 193 mg/dL (09 Aug 2022 12:02)     INTERVAL HPI/OVERNIGHT EVENTS:    S:  Patient is a 66y Male Patient is a 66y old  Male who presents with a chief complaint of Status Epilepticus (09 Aug 2022 12:24)      **No overnight events.**    Skin slowly improving. No new skin complaints.      REVIEW OF SYSTEMS      General: no fevers/chills, no NS	    Skin: see HPI  	  Ophthalmologic: no eye pain or change in vision    Genitourinary: no dysuria or hematuria    Musculoskeletal: no joint pains or weakness	    Neurological:no weakness or tingling        ROS negative except if noted in the above subjective text. All other systems reviewed and negative.    MEDICATIONS  (STANDING):  amLODIPine   Tablet 10 milliGRAM(s) Oral daily  chlorhexidine 2% Cloths 1 Application(s) Topical <User Schedule>  doxazosin 4 milliGRAM(s) Oral <User Schedule>  epoetin cortney-epbx (RETACRIT) Injectable 30694 Unit(s) SubCutaneous every 7 days  finasteride 5 milliGRAM(s) Oral daily  fluconAZOLE   Tablet 400 milliGRAM(s) Oral daily  fosphenytoin IVPB 200 milliGRAM(s) PE IV Intermittent every 12 hours  heparin   Injectable 5000 Unit(s) SubCutaneous every 12 hours  hydrALAZINE 25 milliGRAM(s) Oral every 8 hours  insulin glargine Injectable (LANTUS) 12 Unit(s) SubCutaneous at bedtime  insulin lispro (ADMELOG) corrective regimen sliding scale   SubCutaneous three times a day before meals  insulin lispro (ADMELOG) corrective regimen sliding scale   SubCutaneous at bedtime  levothyroxine 50 MICROGram(s) Oral daily  melatonin 6 milliGRAM(s) Oral at bedtime  mycophenolate mofetil 250 milliGRAM(s) Oral <User Schedule>  pantoprazole    Tablet 40 milliGRAM(s) Oral before breakfast  polyethylene glycol 3350 17 Gram(s) Oral daily  predniSONE   Tablet 20 milliGRAM(s) Oral daily  senna 2 Tablet(s) Oral at bedtime  sirolimus 2.5 milliGRAM(s) Oral <User Schedule>  trimethoprim   80 mG/sulfamethoxazole 400 mG 1 Tablet(s) Oral daily  valGANciclovir 450 milliGRAM(s) Oral <User Schedule>    MEDICATIONS  (PRN):  acetaminophen     Tablet .. 650 milliGRAM(s) Oral every 6 hours PRN Mild Pain (1 - 3)      Allergies    No Known Allergies    Intolerances          O: ICU Vital Signs Last 24 Hrs  T(C): 36.8 (09 Aug 2022 14:15), Max: 37.6 (08 Aug 2022 23:00)  T(F): 98.2 (09 Aug 2022 14:15), Max: 99.7 (08 Aug 2022 23:00)  HR: 61 (09 Aug 2022 14:15) (55 - 72)  BP: 160/68 (09 Aug 2022 14:15) (131/59 - 218/94)  BP(mean): 98 (09 Aug 2022 14:15) (85 - 135)  ABP: --  ABP(mean): --  RR: 13 (09 Aug 2022 14:15) (12 - 26)  SpO2: 96% (09 Aug 2022 14:15) (95% - 100%)    O2 Parameters below as of 09 Aug 2022 07:00  Patient On (Oxygen Delivery Method): room air          I&O's Detail    08 Aug 2022 07:01  -  09 Aug 2022 07:00  --------------------------------------------------------  IN:    Insulin: 20.5 mL    IV PiggyBack: 50 mL    Oral Fluid: 952 mL    Other (mL): 500 mL  Total IN: 1522.5 mL    OUT:    Indwelling Catheter - Urethral (mL): 20 mL    Other (mL): 2500 mL  Total OUT: 2520 mL    Total NET: -997.5 mL      09 Aug 2022 07:01  -  09 Aug 2022 14:46  --------------------------------------------------------  IN:    Insulin: 2.5 mL    Oral Fluid: 500 mL    Other (mL): 500 mL  Total IN: 1002.5 mL    OUT:    Other (mL): 3000 mL  Total OUT: 3000 mL    Total NET: -1997.5 mL          PHYSICAL EXAM:     The patient was alert and oriented X 3, well nourished, and in no  apparent distress.  OP showed no ulcerations  There was no visible lymphadenopathy.  Conjunctiva were non injected  There was no clubbing or edema of extremities.  The scalp, hair, face, eyebrows, lips, OP, neck, chest, back,   extremities X 4, nails were examined.  There was no hyperhidrosis or bromhidrosis.    Of note on skin exam:     improved- indurated well-demarcated plaque erythema on the proximal R thigh, R abdomen  swollen, edematous scrotum and penis  edematous extremities    Labs:                       8.7    22.22 )-----------( 321      ( 09 Aug 2022 05:52 )             26.9     CBC Full  -  ( 09 Aug 2022 05:52 )  WBC Count : 22.22 K/uL  RBC Count : 2.90 M/uL  Hemoglobin : 8.7 g/dL  Hematocrit : 26.9 %  Platelet Count - Automated : 321 K/uL  Mean Cell Volume : 92.8 fl  Mean Cell Hemoglobin : 30.0 pg  Mean Cell Hemoglobin Concentration : 32.3 gm/dL  Auto Neutrophil # : 17.99 K/uL  Auto Lymphocyte # : 0.60 K/uL  Auto Monocyte # : 1.66 K/uL  Auto Eosinophil # : 0.14 K/uL  Auto Basophil # : 0.11 K/uL  Auto Neutrophil % : 81.0 %  Auto Lymphocyte % : 2.7 %  Auto Monocyte % : 7.5 %  Auto Eosinophil % : 0.6 %  Auto Basophil % : 0.5 %    08-09    131<L>  |  94<L>  |  51<H>  ----------------------------<  130<H>  4.0   |  23  |  3.43<H>    Ca    8.3<L>      09 Aug 2022 05:54  Phos  3.6     08-09  Mg     2.0     08-09    TPro  5.6<L>  /  Alb  1.9<L>  /  TBili  0.2  /  DBili  0.2  /  AST  16  /  ALT  14  /  AlkPhos  261<H>  08-09    PT/INR - ( 09 Aug 2022 05:52 )   PT: 14.3 sec;   INR: 1.24 ratio         PTT - ( 09 Aug 2022 05:52 )  PTT:31.3 sec    CAPILLARY BLOOD GLUCOSE  269 (08 Aug 2022 13:50)      POCT Blood Glucose.: 193 mg/dL (09 Aug 2022 12:02)

## 2022-08-11 NOTE — PROGRESS NOTE ADULT - NS ATTEND AMEND GEN_ALL_CORE FT
65 y/o male w/ a PMHx of CAD s/p CABG, AF on Eliquis c/b CVA c/b seizures, HTN, HLD, DM type II, hypothyroidism, GI bleed due to a duodenal ulcer, and ESRD s/p DDRT 4/21/22 c/b DGF requiring HD & large perinephric hematoma 5/22 s/p evacuation w/ revision of arterial anastomosis who presented in status epilepticus    Awake and alert, no further seizure on Fosphenytoin, will check Dilantin level  Hemodynamically stable, off Coreg, bradycardia resolved. HTN control with Hydralazine and Amlodipine  Saturating well on RA  Good PO intake with assistance  Antirejection meds  ppx abx  Continue HD with fluid removal.  On Abx vanco dose again for level of 14,  resolving cellulitis of rt flank. CT A/P did not show any collection or gas.   Hypoglycemic this morning, will DC pre meal and adjust Lantus dose. off Insulin drip  Duplex of lower and UE neg for DVT   On pharm DVT ppx with SQ Heparin tid, h/o psoas hematoma, repeat HCT ordered for mild drop in HCT

## 2022-08-11 NOTE — PROGRESS NOTE ADULT - ASSESSMENT
66M with h/o DM type II, HTN, CAD s/p CABG in 2020, Afib on Eliquis, CVA in 2019 due to Afib, Seizure d/o (last episode was on 4/8/22), h/o GI bleed in 2/2022 EGD with duodenal ulcer and ESRD on HD s/p R DDRT from DCD donor on 4/21/22 complicated by DGF requiring HD until 4/29/22 who presented with status epilepticus in setting of UTI/antibiotics and sub-therapeutic valproic acid level. Transferred to SICU on 7/25 with signs of sepsis, now with refractory seizures    Seizure Disorder:  - refractory seizures o/n.  Neuro/Epilepsy Teams following. Follow recommendations closely. continue EEG. Avoid Fycompa 2/2 agitation in past  - prior MRI head 6/27 with less concerns for PRES, likely ischemic changes  - o/n CTH 8/3 with no acute findings  - replete lytes aggressively  -FU fosphenytoin level  - FU Free fosphenytoin level    R DCD DDRT 4/21/22 now with 2A ACR  - 2A ACR: s/p pulse steroids, now on 20mg QD will keep   - will continue HD daily per renal given persistent fluid overload and anuria  - Scrotal support  - On vanc by level for ppx (level today 16.5). Bld cx from 8/3 w/ NGTD  - graft doppler stable, stable perinephric collection last US 8/5 and CT scan 8/8  - S/P removal of ureteral stent on 7/12  - Continue Doxazosin/Proscar for BPH  - OOB with RN/PT  - DSA negative   - ADAT. 1L fluid restriction  - follow up punch biopsy of the wound: 8/8 Culture ngtd  - DVT ppx with SQH  - fungitel negative  - LE studies WNL    Immunosuppression:   - Belatacept: 6/23, 7/4, 7/8. Re-loaded 7/8,  as there was a gap between doses 2/2 seizures. 7/18,  last dose 8/1  - on Sirolimus as of  8/2 per level    - Cellcept 250 BID  - continue Pred 20mg QD  PPX:  diflucan 400mg QD, bactrim, valcyte (MW)  - PPx: Valcyte/ bactrim     DM  - steroid induced hyperglycemia, improving with Endocrine management    AFib/R IJ DVT   - Eliquis with ~40% less efficacy while on fosphenytoin.    - Lovenox held for ANA and transaminitis   - rate controlled  - will discuss A/C plans daily    HTN:  - Amlodipine/Doxazosin/Hydralazline  - off Coreg due to bradycardia    DISPO  - continue SICU care

## 2022-08-12 NOTE — PROGRESS NOTE ADULT - ASSESSMENT
66 yr old M with Type 2 DM> unknown control due to skewed A1C 6.6% secondary to chronic anemia and s/p blood tx. On basal/bolus insulin therapy PTA. DM c/b ESRD s/p DDRT on 3/21, CVA, CAD s/p CABG, Afib on apixaban, seizure activity, HTN, HLD, h/o GI bleed in 2/2022 EGD with duodenal ulcer, discharged to [facility] rehab center after prior hospitalization, now re-admitted from [facility] for seizures c/f status epilepticus in setting of UTI. endocrine consulted for steroid induced hyperglycemia. Prolonged hospital course w/ ICU stay, previously intubated/extubated, previous seizures. S/p ureteral stent removal 7/12/22. 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 now awaiting IR bx.     Type 2 diabetes mellitus with hypoglycemia, with long-term current use of insulin.   Decreased renal function on HD can cause prolonged insulin action although pt also with hypoalbuminemia so would c/w lantus & adjust dose instead of changing to levemir( levemir associated w/ increase hypoglycemia in pts w/ hypoalbuminemia ) .     BG elevated at lunch  after prandial insulin held inappropriately, would ensure pt receiving prandial insulin if tolerating po .   ·  Plan: BG Goal 100-180mg/dl   -Test BG AC/HS/2am  -FBG tightly controlled, Reduce Lantus 6 units q hs for now  -c/w Admelog 3 units ac meals if eating  -Change to LOW correction scales tid ac, and LOW qHS   -Please contact endo team with changes on steroid doses/diet to make appropriate insulin doses adjustments.    -change Abx to NS as able   Discharge  plan to rehab, c/w basal bolus insulin final doses tbd  Outpatient f/u with Endocrinologist Dr. Lincoln. [address]. Phone  Needs apt at time of discharge  -Needs optho/renal/cardiac f/u as out pt  -Make sure pt has insulin and DM supplies at time of discharge.     Problem/Plan - 2:  ·  Problem: HTN (hypertension).   ·  Plan: Manage per ICU team/renal.  On and off hypertension.     Problem/Plan - 3:  ·  Problem: Hypothyroidism.   ·  Plan: On LT4 50 mcg daily  Noted med given with other meds including Protonix which inhibits LT4 absorption  Please make sure LT4 is given on an empty stomach at least one hour apart from other meds and food to ensure med absorption. DO NOT GIVE WITH VITAMINS/ANTACIDS  If unable to ensure proper time administration switching to IV dosing in order to ensure med absorption. Synthroid 37.5mcg IV   Monitor TFTs outpatient. TSH can be skewed when critically ill.      Discussed with patient and primary  team SICU Resident  on unit >team aware of recommendations, team to place orders   Contact via [employer] Teams during business hours  On evenings and weekends, please call [phone] or page endocrine fellow on call.   Please note that this patient may be followed by different provider tomorrow.    greater than 50% of the encounter was spent counseling and/or coordination of care.  26 minutes spent on total encounter; The necessity of the time spent during the encounter on this date of service was due to development of plan of care/coordination of care/glycemic control through review of labs, blood glucose values and vital signs.  Yes-Patient/Caregiver accepts free interpretation services...

## 2022-08-12 NOTE — PROGRESS NOTE ADULT - SUBJECTIVE AND OBJECTIVE BOX
HISTORY:  65 y/o M with PMH: DM type II, HTN, CAD s/p CABG in 2020, Afib on Eliquis, CVA in 2019 due to Afib, Seizure d/o following CVA last episode was on 4/8/22, h/o GI bleed in 2/2022 EGD with duodenal ulcer.  ESRD due to DM was on HD since 2019.     s/p R DCD DDRT (1A/1V/1U + stent)  on 4/21/22.    Donor was 57 , KDPI 82 HLA mismatch 1, 2, 2. No DSA, cPRA 0%. CMV +/+  Course c/b  by DGF, was on HD until 4/29/22.     Re-admitted in May for anemia in the setting of large perinephric hematoma s/p evacuation and repair of arterial anastomosis on 5/2/22. Intra operative biopsy showed no rejection, Creatinine ranging ~2mg/dL.    In Rehab: recently dx klebsiella UTI rx with ertapenem - then switched to Levaquin day #6.    He also had parainfluenza pneumonia. Was at rehab progressing well.      - admitted with status epilepticus  - 6/11 R pleural effusion - s/p thoracentesis 1.3L -> bleeding post procedure  - 6/11 re-intubated, CT placed, 600ml hemothorax drained.    - 6/15 s/p VATS 2.2L old blood removed; second chest tube placed   - 6/18-19 chest tubes removed  - 6/21: PRES, off ENV  - 6/23: started Belatacept  - 6/26: intubated for AMS and airway protection  - 6/28: extubated  - 6/29: LP negative, antibiotics D/Agustin  - seizure free since 6/27  - 7/11: ureteral stent removed  - ESBL Kleb UTI, Erta x 3D  - 7/25 transferred to SICU for sepsis/elevated LFTs  - 7/27 HD restarted via L shiley  - 7/29 IR bx with 2A ACR, s/p pulse steroids  - 8/4 refractory seizures    24 HOUR EVENTS:  - Will draw dilantin level in the AM  - Got a Vancomycin dose in the day  - Started on nystatin for ppx  - Had a hypoglycemic episode in the day and received dextrose. Made changes to his insulin regimen during the day.  - Can list for floors tomorrow      NEURO  Exam: AAO1. delirium improving  Meds: acetaminophen     Tablet .. 650 milliGRAM(s) Oral every 6 hours PRN Mild Pain (1 - 3)  fosphenytoin IVPB 200 milliGRAM(s) PE IV Intermittent every 12 hours  melatonin 6 milliGRAM(s) Oral at bedtime      RESPIRATORY  RR: 24 (08-12-22 @ 00:00) (14 - 27)  SpO2: 100% (08-12-22 @ 00:00) (94% - 100%)  Wt(kg): --  Exam: clear breath sounds    Meds:     CARDIOVASCULAR  HR: 73 (08-12-22 @ 00:00) (66 - 73)  BP: 148/98 (08-12-22 @ 00:00) (117/59 - 178/74)  BP(mean): 115 (08-12-22 @ 00:00) (75 - 115)  ABP: --  ABP(mean): --  Wt(kg): --  CVP(cm H2O): --  VBG - ( 10 Aug 2022 05:45 )  pH: 7.40  /  pCO2: 40    /  pO2: 51    / HCO3: 25    / Base Excess: 0.0   /  SaO2: 83.3   Lactate: 0.8                Exam: well perfused  Cardiac Rhythm: RRR  Perfusion     [x]Adequate   [ ]Inadequate  Mentation   [ ]Normal       [x]Reduced  Extremities  [x]Warm         [ ]Cool  Volume Status [ ]Hypervolemic [x]Euvolemic [ ]Hypovolemic  Meds: amLODIPine   Tablet 10 milliGRAM(s) Oral daily  doxazosin 4 milliGRAM(s) Oral <User Schedule>  hydrALAZINE 25 milliGRAM(s) Oral every 8 hours      GI/NUTRITION  Exam: soft non tender  Diet: soft bite sized  Meds: pantoprazole    Tablet 40 milliGRAM(s) Oral <User Schedule>  polyethylene glycol 3350 17 Gram(s) Oral daily  senna 2 Tablet(s) Oral at bedtime      GENITOURINARY  I&O's Detail    08-10 @ 07:01  -  08-11 @ 07:00  --------------------------------------------------------  IN:    IV PiggyBack: 154 mL    Oral Fluid: 900 mL    Other (mL): 500 mL  Total IN: 1554 mL    OUT:    Other (mL): 3500 mL  Total OUT: 3500 mL    Total NET: -1946 mL      08-11 @ 07:01  -  08-12 @ 02:00  --------------------------------------------------------  IN:    IV PiggyBack: 350 mL    Oral Fluid: 700 mL    Other (mL): 500 mL  Total IN: 1550 mL    OUT:    Other (mL): 3500 mL    Voided (mL): 110 mL  Total OUT: 3610 mL    Total NET: -2060 mL          08-11    134<L>  |  94<L>  |  52<H>  ----------------------------<  88  3.7   |  24  |  3.48<H>    Ca    8.6      11 Aug 2022 05:37  Phos  3.3     08-11  Mg     2.3     08-11    TPro  5.4<L>  /  Alb  2.0<L>  /  TBili  0.2  /  DBili  0.1  /  AST  13  /  ALT  10  /  AlkPhos  227<H>  08-11    Meds:     HEMATOLOGIC  Meds: heparin   Injectable 5000 Unit(s) SubCutaneous every 8 hours                          8.5    22.34 )-----------( 339      ( 11 Aug 2022 12:23 )             26.3     PT/INR - ( 11 Aug 2022 05:37 )   PT: 14.3 sec;   INR: 1.24 ratio         PTT - ( 11 Aug 2022 05:37 )  PTT:31.2 sec    INFECTIOUS DISEASES  T(C): 37.4 (08-11-22 @ 23:00), Max: 37.6 (08-11-22 @ 03:00)  Wt(kg): --  WBC Count: 22.34 K/uL (08-11 @ 12:23)  WBC Count: 18.47 K/uL (08-11 @ 05:38)    Recent Cultures:  Specimen Source: .Blood Blood-Peripheral, 08-09 @ 18:32; Results   No growth to date.; Gram Stain: --; Organism: --  Specimen Source: .Tissue Other, 08-08 @ 17:44; Results   Testing in progress; Gram Stain:   No polymorphonuclear cells seen seen per low power field  No organisms seen per oil power field; Organism: --  Specimen Source: .Blood Blood-Peripheral, 08-08 @ 04:01; Results   Growth in aerobic bottle: Staphylococcus hominis  Growth in aerobic bottle: Staphylococcus epidermidis  Coag Negative Staphylococcus  Single set isolate, possible contaminant. Contact  Microbiology if susceptibility testing clinically  indicated.  ***Blood Panel PCR results on this specimen are available  approximately 3 hours after the Gram stain result.***  Gram stain, PCR, and/or culture results may not always  correspond due to difference in methodologies.  ************************************************************  This PCR assay was performed by multiplex PCR. This  Assay tests for 66 bacterial and resistance gene targets.  Please refer to the [facility] test directory  at [email or web address] for details.; Gram Stain:   Growth in aerobic bottle: Gram Positive Cocci in Clusters; Organism: Blood Culture PCR    Meds: epoetin cortney-epbx (RETACRIT) Injectable 13992 Unit(s) SubCutaneous every 7 days  mycophenolate mofetil 250 milliGRAM(s) Oral <User Schedule>  nystatin    Suspension 859902 Unit(s) Oral four times a day  sirolimus 3.5 milliGRAM(s) Oral <User Schedule>  trimethoprim   80 mG/sulfamethoxazole 400 mG 1 Tablet(s) Oral daily  valGANciclovir 450 milliGRAM(s) Oral <User Schedule>      ENDOCRINE  Capillary Blood Glucose    Meds: finasteride 5 milliGRAM(s) Oral daily  insulin glargine Injectable (LANTUS) 9 Unit(s) SubCutaneous at bedtime  insulin lispro (ADMELOG) corrective regimen sliding scale   SubCutaneous Before meals and at bedtime  insulin lispro Injectable (ADMELOG) 3 Unit(s) SubCutaneous three times a day before meals  levothyroxine 50 MICROGram(s) Oral daily  predniSONE   Tablet 20 milliGRAM(s) Oral daily      ACCESS DEVICES:  [x] Peripheral IV  [ ] Central Venous Line		[ ] R	[ ] L	[ ] IJ	[ ] Fem	[ ] SC	Placed:   [ ] Arterial Line			[ ] R	[ ] L	[ ] Fem	[ ] Rad	[ ] Ax	Placed:   [ ] PICC:					[ ] Mediport  [ ] Urinary Catheter, Date Placed:   [x] Necessity of urinary, arterial, and venous catheters discussed    OTHER MEDICATIONS:  chlorhexidine 2% Cloths 1 Application(s) Topical <User Schedule>  petrolatum white Ointment 1 Application(s) Topical two times a day

## 2022-08-12 NOTE — PROGRESS NOTE ADULT - ASSESSMENT
66 yr old Male with PMHx ESRD s/p permacath removal 5/10/22 s/p Rt DDRT 4/21/22,  CVA (2019), Afib on apixaban, seizure activity, CAD s/p stents, CABG (2020), HTN, HLD, DM on insulin, gastric/duodenal ulcer, recent hospitalization 4/28/22 -5/10/22 with weakness/anemia found to have perinephric hematoma requiring evacuation and repair of bleeding arterial anastomosis. Curently being treated for UTI with Levaquin Today after developing multiple sz episodes refractory to 5 mg versed IM (EMS) and 2 mg ativan IV in E.D. concerning for status epilepticus requiring intubation for hypoxic respiratory  failure. MICU Consult was called for hypoxic respiratory failure secondary to status epilepticus.  Nephrology consulted for renal failure.     A/P  DDRT on 04/21/22  Course complicated by DGF, was on HD until 4/29/22. Readmitted in May for anemia in the setting of large perinephric hematoma s/p evacuation and repair of arterial anastomosis on 5/02/22. Intra operative biopsy showed no rejection.  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   Now with failed allograft function, Remains anuric and is dialysis dependent.   continue Immunosuppression per transplant team  plan for UF today - RRT per transplant team    transplant team on board   monitor BMP, U/O     HTN   acceptable   plan for UF today   BP manage by transplant team    monitor BP closely

## 2022-08-12 NOTE — PROGRESS NOTE ADULT - SUBJECTIVE AND OBJECTIVE BOX
[facility] [place] [facility]   MD NINA MASON MD, PA KRISTINE SOLTANPOUR, DO INJUNG KO, NP    TEL:  OFFICE: [phone]    From 5pm-7am Answering Service [phone]    -- RENAL FOLLOW UP NOTE ---Date of Service 08-12-22 @ 14:42    Patient is a 66y old  Male who presents with a chief complaint of Status Epilepticus (12 Aug 2022 13:31)      Patient seen and examined at bedside. No chest pain/sob    VITALS:  T(F): 99.5 (08-12-22 @ 07:00), Max: 99.5 (08-12-22 @ 03:00)  HR: 71 (08-12-22 @ 13:00)  BP: 158/69 (08-12-22 @ 13:00)  RR: 16 (08-12-22 @ 13:00)  SpO2: 98% (08-12-22 @ 13:00)  Wt(kg): --    08-11 @ 07:01  -  08-12 @ 07:00  --------------------------------------------------------  IN: 1600 mL / OUT: 3610 mL / NET: -2010 mL      PHYSICAL EXAM:  Constitutional: NAD  Neck: No JVD  Respiratory: CTAB, no wheezes, rales or rhonchi  Cardiovascular: S1, S2, RRR  Gastrointestinal: BS+, soft, NT/ND  Extremities:+ peripheral edema    Hospital Medications:   MEDICATIONS  (STANDING):  amLODIPine   Tablet 10 milliGRAM(s) Oral daily  chlorhexidine 2% Cloths 1 Application(s) Topical <User Schedule>  doxazosin 4 milliGRAM(s) Oral <User Schedule>  epoetin cortney-epbx (RETACRIT) Injectable 38532 Unit(s) SubCutaneous every 7 days  finasteride 5 milliGRAM(s) Oral daily  fosphenytoin IVPB 200 milliGRAM(s) PE IV Intermittent every 12 hours  heparin   Injectable 5000 Unit(s) SubCutaneous every 8 hours  hydrALAZINE 25 milliGRAM(s) Oral every 8 hours  insulin glargine Injectable (LANTUS) 6 Unit(s) SubCutaneous at bedtime  insulin lispro (ADMELOG) corrective regimen sliding scale   SubCutaneous three times a day before meals  insulin lispro (ADMELOG) corrective regimen sliding scale   SubCutaneous at bedtime  insulin lispro Injectable (ADMELOG) 3 Unit(s) SubCutaneous three times a day before meals  levothyroxine 50 MICROGram(s) Oral daily  melatonin 6 milliGRAM(s) Oral at bedtime  mycophenolate mofetil 250 milliGRAM(s) Oral <User Schedule>  nystatin    Suspension 135035 Unit(s) Oral four times a day  pantoprazole    Tablet 40 milliGRAM(s) Oral <User Schedule>  petrolatum white Ointment 1 Application(s) Topical two times a day  polyethylene glycol 3350 17 Gram(s) Oral daily  predniSONE   Tablet 20 milliGRAM(s) Oral daily  senna 2 Tablet(s) Oral at bedtime  sirolimus 3.5 milliGRAM(s) Oral <User Schedule>  trimethoprim   80 mG/sulfamethoxazole 400 mG 1 Tablet(s) Oral daily  valGANciclovir 450 milliGRAM(s) Oral <User Schedule>      LABS:  08-12    135  |  96  |  41<H>  ----------------------------<  122<H>  4.1   |  28  |  2.91<H>    Ca    8.4      12 Aug 2022 05:06  Phos  2.6     08-12  Mg     2.0     08-12    TPro  6.1  /  Alb  2.5<L>  /  TBili  0.3  /  DBili  0.2  /  AST  16  /  ALT  13  /  AlkPhos  243<H>  08-12    Creatinine Trend: 2.91 <--, 3.48 <--, 2.71 <--, 4.25 <--, 3.70 <--, 3.43 <--, 3.23 <--, 4.70 <--, 4.58 <--, 3.98 <--, 3.68 <--, 3.91 <--, 3.29 <--    Albumin, Serum: 2.5 g/dL (08-12 @ 05:06)  Phosphorus Level, Serum: 2.6 mg/dL (08-12 @ 05:06)                              8.1    17.42 )-----------( 336      ( 12 Aug 2022 05:06 )             25.7     Urine Studies:  Urinalysis - [07-25-22 @ 18:28]      Color Yellow / Appearance Slightly Turbid / SG 1.020 / pH 5.5      Gluc Negative / Ketone Negative  / Bili Negative / Urobili Negative       Blood Small / Protein 100 / Leuk Est Large / Nitrite Negative      RBC 2 / WBC 40 / Hyaline 5 / Gran  / Sq Epi  / Non Sq Epi 0 / Bacteria Many      Iron 17, TIBC 171, %sat 10      [05-02-22 @ 13:03]  Ferritin 6336      [07-27-22 @ 13:00]  PTH -- (Ca 9.2)      [02-05-22 @ 10:13]   294  HbA1c 6.0      [02-21-20 @ 08:53]  TSH 4.69      [07-06-22 @ 18:36]  Lipid: chol --, , HDL --, LDL --      [07-27-22 @ 13:00]    HBsAg Nonreact      [07-25-22 @ 11:51]  HCV 0.18, Nonreact      [07-25-22 @ 11:51]      RADIOLOGY & ADDITIONAL STUDIES:

## 2022-08-12 NOTE — PROGRESS NOTE ADULT - ATTENDING COMMENTS
Mental status stable  Kidney not functioning due to rejection.   duplex negative for DVT  Erythema stable.  Now plan for muscle biopsy  Dialysis with UF only for volume removal.

## 2022-08-12 NOTE — CONSULT NOTE ADULT - ASSESSMENT
66M with h/o DM type II, HTN, CAD s/p CABG in 2020, Afib on Eliquis, CVA in 2019 due to Afib, Seizure d/o (last episode was on 4/8/22), h/o GI bleed in 2/2022 EGD with duodenal ulcer and ESRD on HD s/p R DDRT from DCD donor on 4/21/22 complicated by DGF requiring HD until 4/29/22 who presented with status epilepticus in setting of UTI/antibiotics and sub-therapeutic valproic acid level. Transferred to SICU on 7/25 with signs of sepsis, now with refractory seizures.   Surgery consulted for muscle biopsy with microbiology PCR to eval for pyomyositis    Plan:  Will plan for muscle biopsy early next week       66M with h/o DM type II, HTN, CAD s/p CABG in 2020, Afib on Eliquis, CVA in 2019 due to Afib, Seizure d/o (last episode was on 4/8/22), h/o GI bleed in 2/2022 EGD with duodenal ulcer and ESRD on HD s/p R DDRT from [facility] donor on 4/21/22 complicated by DGF requiring HD until 4/29/22 who presented with status epilepticus in setting of UTI/antibiotics and sub-therapeutic valproic acid level. Transferred to SICU on 7/25 with signs of sepsis, now with refractory seizures.   Surgery consulted for muscle biopsy with microbiology PCR to eval for infectious etiologies     Plan:  General Surgery on board for muscle biopsy to evaluate for infectious etiologie  Will continue to follow  continue care per transplant and SICU    Plan discussed with and approved by attending, Dr. Jose Carlos Ramirez MD PGY-2  ATP surgery [id number]

## 2022-08-12 NOTE — PROGRESS NOTE ADULT - NUTRITIONAL ASSESSMENT

## 2022-08-12 NOTE — PROGRESS NOTE ADULT - ASSESSMENT
66M with ESRD s/p PermCath removal 5/10/22 s/p Rt DDRT 4/21/22,  CVA (2019), Afib on apixaban, seizure activity, CAD s/p stents, CABG (2020), HTN, HLD, DM on insulin, gastric/duodenal ulcer, recent hospitalization 4/28/22 -5/10/22 with weakness/anemia found to have perinephric hematoma requiring evacuation and repair of bleeding arterial anastomosis admitted 6/10 for status epilepticus requiring intubation for hypoxic respiratory failure.     s/p LP which was not suggestive of infectious process  U/A (7/12) 161 WBC  UCx (7/13) 50-99K ESBL Klebsiella   s/p 3 days of Ertapenem  UA (7/25) with 40 WBC  UCx (7/25) 50-99k ESBL Klebsiella     RUQ (7/25) with hepatomegaly   CT c/a/p (7/25) with only small perinephric fluid collection, small volume ascites.  Doppler US R Kidney (7/27) with mild thickening of collecting system and ureter and small   Doppler US R Kidney (7/29) with fullness of collecting system and continued urothelial thickening.     RVP (7/25) Negative  CMV PCR (7/22) Negative  EBV PCR (7/25) Negative   HBV PCR (7/25) Negative   HHV-6 PCR (7/25) POSITIVE   Parvovirus IgM and PCR (7/26) Negative   HSV 1/2 PCR (7/26) Negative   Adenovirus PCR (7/26) Negative    CT A/P (8/5) with No drainable fluid collections and Right psoas retroperitoneal hematoma.    Antibiotic Course:  Vancomycin: 6/11, 6/26 --> 6/29, 7/25, 8/4 -->   Ertapenem: 6/14 --> 6/18, 6/22 --> 6/23, 7/15 --> 7/17, 7/28 --> 8/3  Meropenem: 6/26 --> 6/29, 7/25 --> 7/28  Fluconazole: 6/11 --> 6/21, 6/26 --> 7/6, 7/26 --> 8/10    #Rash, Erythema overlying Renal Transplant, Leukocytosis, Fever  Developed after 10 days of Carbapenem Therapy  No significant improvement after Vancomycin initiation  Concern for atypical infectious etiology (or noninfectious process)  Surgical Path (8/8) nonspecific findings including edema, telangiectasia and sparse mixed infiltrate of lymphocytes and neutrophils, culture prelim negative, AFB negative. Findings are nonspecific ?Cellulitis  --Can continue Vancomycin by level (8/4 -->); would limit duration to 10-14 days total  --Agree with Dermatology recommendation; would see if we can pursue biopsy of abdominal wall muscle for additional culture yield  --Follow up on Dermatology Bacterial, Fungal and AFB Cultures    #Positive Blood Culture  BCx (8/8) with Coagulase negative staph 1 out of 4   BCx (8/9) NGTD  Suspect Coagulase negative staph is a contaminant  --Follow up on prelim cultures    #ESRD s/p DDRT (4/21/2022) (CMV +/+, EBV +/+)  --Continue Bactrim SS PO Q24H for PCP PPx  --Continue Valcyte 450 mg PO Mon/Wed/Fri for CMV PPx    I will be away over this upcoming weekend. Please contact the Infectious Diseases Office with any further questions or concerns.     Carlton Hoover M.D.  [facility] Division of Infectious Disease  8AM-5PM Monday - Friday: Available on [employer] Teams  After Hours and Holidays (or if no response on [employer] Teams): Please contact the Infectious Diseases Office at [phone]     The above assessment and plan were discussed with Charlotte, transplant surgery NP and Dr Enciso   66M with ESRD s/p PermCath removal 5/10/22 s/p Rt DDRT 4/21/22,  CVA (2019), Afib on apixaban, seizure activity, CAD s/p stents, CABG (2020), HTN, HLD, DM on insulin, gastric/duodenal ulcer, recent hospitalization 4/28/22 -5/10/22 with weakness/anemia found to have perinephric hematoma requiring evacuation and repair of bleeding arterial anastomosis admitted 6/10 for status epilepticus requiring intubation for hypoxic respiratory failure.     s/p LP which was not suggestive of infectious process  U/A (7/12) 161 WBC  UCx (7/13) 50-99K ESBL Klebsiella   s/p 3 days of Ertapenem  UA (7/25) with 40 WBC  UCx (7/25) 50-99k ESBL Klebsiella     RUQ (7/25) with hepatomegaly   CT c/a/p (7/25) with only small perinephric fluid collection, small volume ascites.  Doppler US R Kidney (7/27) with mild thickening of collecting system and ureter and small   Doppler US R Kidney (7/29) with fullness of collecting system and continued urothelial thickening.     RVP (7/25) Negative  CMV PCR (7/22) Negative  EBV PCR (7/25) Negative   HBV PCR (7/25) Negative   HHV-6 PCR (7/25) POSITIVE   Parvovirus IgM and PCR (7/26) Negative   HSV 1/2 PCR (7/26) Negative   Adenovirus PCR (7/26) Negative    CT A/P (8/5) with No drainable fluid collections and Right psoas retroperitoneal hematoma.    Antibiotic Course:  Vancomycin: 6/11, 6/26 --> 6/29, 7/25, 8/4 -->   Ertapenem: 6/14 --> 6/18, 6/22 --> 6/23, 7/15 --> 7/17, 7/28 --> 8/3  Meropenem: 6/26 --> 6/29, 7/25 --> 7/28  Fluconazole: 6/11 --> 6/21, 6/26 --> 7/6, 7/26 --> 8/10    #Rash, Erythema overlying Renal Transplant, Leukocytosis, Fever  Developed after 10 days of Carbapenem Therapy  No significant improvement after Vancomycin initiation  Concern for atypical infectious etiology (or noninfectious process)  Dermatology Surgical Path (8/8) nonspecific findings including edema, telangiectasia and sparse mixed infiltrate of lymphocytes and neutrophils, culture prelim negative, AFB negative.   Findings are nonspecific ?Cellulitis  --Can continue Vancomycin by level (8/4 -->); would limit duration to 10-14 days total  --Agree with Dermatology recommendation; would see if we can pursue biopsy of abdominal wall muscle for additional culture yield  --Follow up on Dermatology Bacterial, Fungal and AFB Cultures    #Positive Blood Culture  BCx (8/8) with Coagulase negative staph 1 out of 4   BCx (8/9) NGTD  Suspect Coagulase negative staph is a contaminant  --Follow up on prelim cultures    #ESRD s/p DDRT (4/21/2022) (CMV +/+, EBV +/+)  --Continue Bactrim SS PO Q24H for PCP PPx  --Continue Valcyte 450 mg PO Mon/Wed/Fri for CMV PPx    I will be away over this upcoming weekend. Please contact the Infectious Diseases Office with any further questions or concerns.     Carlton Hoover M.D.  [facility] Division of Infectious Disease  8AM-5PM Monday - Friday: Available on [employer] Teams  After Hours and Holidays (or if no response on [employer] Teams): Please contact the Infectious Diseases Office at [phone]     The above assessment and plan were discussed with Charlotte, transplant surgery NP and Dr Enciso

## 2022-08-12 NOTE — PROGRESS NOTE ADULT - SUBJECTIVE AND OBJECTIVE BOX
seen earlier today     Chief Complaint: Type 2 Diabetes Mellitus     INTERVAL HX: prandial insulin held by staff as ordered for while pt on enteral feeds which pt is not on , d/w team to fix order, now pt w/ hyperglycemia at lunch. overnight scales were changed to MDSS by team. pt tolerating po . bg elevated at bedtime after receiving vanco in d5. no further hypoglycemia       Review of Systems:  General: As above  Cardiovascular: No chest pain  Respiratory: No SOB  GI: No nausea, vomiting  Endocrine: no  S&Sx of hypoglycemia    Allergies    No Known Allergies    Intolerances      MEDICATIONS  (STANDING):  amLODIPine   Tablet 10 milliGRAM(s) Oral daily  chlorhexidine 2% Cloths 1 Application(s) Topical <User Schedule>  doxazosin 4 milliGRAM(s) Oral <User Schedule>  epoetin cortney-epbx (RETACRIT) Injectable 63991 Unit(s) SubCutaneous every 7 days  finasteride 5 milliGRAM(s) Oral daily  fosphenytoin IVPB 200 milliGRAM(s) PE IV Intermittent every 12 hours  heparin   Injectable 5000 Unit(s) SubCutaneous every 8 hours  hydrALAZINE 25 milliGRAM(s) Oral every 8 hours  insulin glargine Injectable (LANTUS) 6 Unit(s) SubCutaneous at bedtime  insulin lispro (ADMELOG) corrective regimen sliding scale   SubCutaneous three times a day before meals  insulin lispro (ADMELOG) corrective regimen sliding scale   SubCutaneous at bedtime  insulin lispro Injectable (ADMELOG) 3 Unit(s) SubCutaneous three times a day before meals  levothyroxine 50 MICROGram(s) Oral daily  melatonin 6 milliGRAM(s) Oral at bedtime  mycophenolate mofetil 250 milliGRAM(s) Oral <User Schedule>  nystatin    Suspension 302593 Unit(s) Oral four times a day  pantoprazole    Tablet 40 milliGRAM(s) Oral <User Schedule>  petrolatum white Ointment 1 Application(s) Topical two times a day  polyethylene glycol 3350 17 Gram(s) Oral daily  predniSONE   Tablet 20 milliGRAM(s) Oral daily  senna 2 Tablet(s) Oral at bedtime  sirolimus 3.5 milliGRAM(s) Oral <User Schedule>  trimethoprim   80 mG/sulfamethoxazole 400 mG 1 Tablet(s) Oral daily  valGANciclovir 450 milliGRAM(s) Oral <User Schedule>        finasteride   5 milliGRAM(s) Oral (08-12-22 @ 13:08)    insulin glargine Injectable (LANTUS)   9 Unit(s) SubCutaneous (08-11-22 @ 21:42)    insulin lispro (ADMELOG) corrective regimen sliding scale   3 Unit(s) SubCutaneous (08-12-22 @ 13:06)    insulin lispro (ADMELOG) corrective regimen sliding scale   1 Unit(s) SubCutaneous (08-11-22 @ 17:06)    insulin lispro Injectable (ADMELOG)   3 Unit(s) SubCutaneous (08-11-22 @ 17:06)    insulin lispro Injectable (ADMELOG)   3 Unit(s) SubCutaneous (08-12-22 @ 13:05)    levothyroxine   50 MICROGram(s) Oral (08-12-22 @ 05:10)    predniSONE   Tablet   20 milliGRAM(s) Oral (08-12-22 @ 05:10)        PHYSICAL EXAM:  VITALS: T(C): 37.5 (08-12-22 @ 07:00)  T(F): 99.5 (08-12-22 @ 07:00), Max: 99.5 (08-12-22 @ 03:00)  HR: 71 (08-12-22 @ 11:00) (67 - 79)  BP: 145/64 (08-12-22 @ 11:00) (124/59 - 178/74)  RR:  (15 - 28)  SpO2:  (95% - 100%)  Wt(kg): --  GENERAL: male laying in bed in NAD  Respiratory: Respirations unlabored   Extremities: Warm  NEURO: Alert , calm       LABS:    POCT Blood Glucose.: 257 mg/dL (08-12-22 @ 13:03)  POCT Blood Glucose.: 96 mg/dL (08-12-22 @ 08:54)  POCT Blood Glucose.: 232 mg/dL (08-11-22 @ 21:40)  POCT Blood Glucose.: 189 mg/dL (08-11-22 @ 17:03)  POCT Blood Glucose.: 219 mg/dL (08-11-22 @ 12:14)  POCT Blood Glucose.: 155 mg/dL (08-11-22 @ 09:38)  POCT Blood Glucose.: 61 mg/dL (08-11-22 @ 08:07)  POCT Blood Glucose.: 194 mg/dL (08-10-22 @ 21:16)  POCT Blood Glucose.: 173 mg/dL (08-10-22 @ 17:09)  POCT Blood Glucose.: 158 mg/dL (08-10-22 @ 11:25)  POCT Blood Glucose.: 115 mg/dL (08-10-22 @ 07:30)  POCT Blood Glucose.: 194 mg/dL (08-09-22 @ 21:49)  POCT Blood Glucose.: 229 mg/dL (08-09-22 @ 17:05)                            8.1    17.42 )-----------( 336      ( 12 Aug 2022 05:06 )             25.7       08-12    135  |  96  |  41<H>  ----------------------------<  122<H>  4.1   |  28  |  2.91<H>    Ca    8.4      12 Aug 2022 05:06  Phos  2.6     08-12  Mg     2.0     08-12    TPro  6.1  /  Alb  2.5<L>  /  TBili  0.3  /  DBili  0.2  /  AST  16  /  ALT  13  /  AlkPhos  243<H>  08-12      eGFR: 23 mL/min/1.73m2 (12 Aug 2022 05:06)      07-27 Chol -- Direct LDL -- LDL calculated -- HDL -- Trig 118    Thyroid Function Tests:          A1C with Estimated Average Glucose Result: 6.0 % (06-30-22 @ 05:49)      Estimated Average Glucose: 126 mg/dL (06-30-22 @ 05:49)

## 2022-08-12 NOTE — PROGRESS NOTE ADULT - SUBJECTIVE AND OBJECTIVE BOX
Transplant Surgery - Multidisciplinary Progress Note  --------------------------------------------------------------  DDRT     4/21/2022             Present:  Patient seen with multidisciplinary team including Transplant Surgeon: Dr. Tena,  Nephrologist: Dr. NORBERTO Lomeli, transplant pharmacist CELIA Martinez, nephrology fellow, NP Jose, Resident Melecio. Disciplines not in attendance will be notified of the plan.      66M with PMH: DM type II, HTN, CAD s/p CABG in 2020, AFib on Eliquis, CVA in 2019 due to Afib, Seizure d/o following CVA, last episode was on 4/8/22, h/o GIB in 2/2022 EGD with duodenal ulcer.  ESRD due to DM was on HD since 2019.     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  Donor was 58 , KDPI 82%, DCD, single vessels and ureter, HLA mismatch 1, 2, 2. No DSA, cPRA 0%. 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 Re-admitted in May for anemia in the setting of large perinephric hematoma s/p evacuation and repair of arterial anastomosis on 5/2/22. Intra operative biopsy showed no rejection, Creatinine ranging ~2mg/dL.    In Rehab:  He was recently dx with klebsiella UTI with Ertapenem - then switched to Levaquin    He also had parainfluenza pneumonia. Was at rehab progressing well.      Hospital course:  - 6/10: transferred from rehab facility for seizure status epilepticus. Intubated for respiratory protection and transferred to MICU.  EEG showed no seizure activity. Neuro consulted.   - 6/11: Extubated. Found to have R pleural effusion. s/p Thoracentesis 1.3L. Eliquis changed to heparin drip.  Post procedure drop in H&H. 5u PRBC, 2 u FFP.   - 6/11 evening: Transferred to SICU from MICU for further care in setting of hypoxia, significant R pleural effusion, anemia and hemodynamic instability  - 6/11 Intubated at 9pm and sedated on Precedex.  CT placed, 600ml blood drained.  1L total overnight, started pressors  - 6/11 Received Protamine for PTT >100 (was on Heparin drip started for AF), 2 u FFP and 5u PRBC total  - 6/13 s/p VATS 2.2L old blood removed; second chest tube placed  - 6/18-19: chest tubes removed  - 6/21: ?PRES vs. chronic ischemic changes on MRI, off Envarsus, switched to Belatacept 6/23 with good graft function  - 6/25: Tx to SICU for refractory seizures, improved with IV antiepileptic regimen  - 7/10: Downgraded from SICU to floor.   - 7/11: OR-->URETERAL STENT REMOVED  - 7/15: ESBL Kleb UTI (50-90K), completed Ertapenem x 3 Days  - 7/25: Tx to SICU for Sepsis/ elevated LFTS  - 7/27: Shiley placed for HD  - 7/28: ongoing fevers -> started Ertapenem/Fluc  - 7/29: HD restarted + 1U PRBC.  IR bx with 2A rejection, started pulse steroids. LFTs have improved  - 8/4: refractory seizures    Interval events:   -No obvious seizures overnight. On Fosphenytoin 200 BID  -s/p HD daily, s/p permacath exchange 8/8  -more alert passed S&S on PO diet. Seen tolerating diet at bedside with assistance  -R sided erythema around wound stable w/ + scrotal and penile edema. On Vancomycin  -8/8 S/P punch biopsy of wound by derm, without pathologic finding. Culture ngtd  -LE venous and arterial dopplers with no DVT or arterial occlusion      Immunosuppression  Induction:  Thymoglobulin                                        Maintenance:  - Belatacept: 6/23, 7/4, 7/8, 7/18. 8/1, now discontinued  - On Sirolimus as of 8/2  - Cellcept to 250 BID  - Pred 20mg po qd   - Ongoing monitoring for signs of rejection.    Potential Discharge date: pending clinical improvement.     Education:  Medications    Plan of care:  See Below        MEDICATIONS  (STANDING):  amLODIPine   Tablet 10 milliGRAM(s) Oral daily  chlorhexidine 2% Cloths 1 Application(s) Topical <User Schedule>  doxazosin 4 milliGRAM(s) Oral <User Schedule>  epoetin cortney-epbx (RETACRIT) Injectable 29985 Unit(s) SubCutaneous every 7 days  finasteride 5 milliGRAM(s) Oral daily  fosphenytoin IVPB 200 milliGRAM(s) PE IV Intermittent every 12 hours  heparin   Injectable 5000 Unit(s) SubCutaneous every 8 hours  hydrALAZINE 25 milliGRAM(s) Oral every 8 hours  insulin glargine Injectable (LANTUS) 9 Unit(s) SubCutaneous at bedtime  insulin lispro (ADMELOG) corrective regimen sliding scale   SubCutaneous Before meals and at bedtime  insulin lispro Injectable (ADMELOG) 3 Unit(s) SubCutaneous three times a day before meals  levothyroxine 50 MICROGram(s) Oral daily  melatonin 6 milliGRAM(s) Oral at bedtime  mycophenolate mofetil 250 milliGRAM(s) Oral <User Schedule>  nystatin    Suspension 758776 Unit(s) Oral four times a day  pantoprazole    Tablet 40 milliGRAM(s) Oral <User Schedule>  petrolatum white Ointment 1 Application(s) Topical two times a day  polyethylene glycol 3350 17 Gram(s) Oral daily  predniSONE   Tablet 20 milliGRAM(s) Oral daily  senna 2 Tablet(s) Oral at bedtime  sirolimus 3.5 milliGRAM(s) Oral <User Schedule>  trimethoprim   80 mG/sulfamethoxazole 400 mG 1 Tablet(s) Oral daily  valGANciclovir 450 milliGRAM(s) Oral <User Schedule>    MEDICATIONS  (PRN):  acetaminophen     Tablet .. 650 milliGRAM(s) Oral every 6 hours PRN Mild Pain (1 - 3)      PAST MEDICAL & SURGICAL HISTORY:  Hypertension      Diabetes      Dyslipidemia      CAD (Coronary Artery Disease)  with Stents in 06/2009 and 6/2019, s/p off-pump C3L on 8/13/20      Hypothyroidism      CVA (cerebral vascular accident)  12/13/19 with residual bilateral weakness      Anemia      PEG (percutaneous endoscopic gastrostomy) status  removed July 2020      Intubation of airway performed without difficulty  Dec 2019  CVA c/b status epilepticus requiring intubation and PEG in 12/2019-      ESRD on dialysis  M/W/F      Seizures  after CVA, last seizure was last week 4/07/22      History of insertion of stent into coronary artery bypass graft  2009 and 2019      S/P CABG x 3  off pump C3L on 8/13/20          Vital Signs Last 24 Hrs  T(C): 37.5 (12 Aug 2022 07:00), Max: 37.5 (12 Aug 2022 03:00)  T(F): 99.5 (12 Aug 2022 07:00), Max: 99.5 (12 Aug 2022 03:00)  HR: 77 (12 Aug 2022 08:00) (66 - 79)  BP: 149/65 (12 Aug 2022 08:00) (124/59 - 178/74)  BP(mean): 94 (12 Aug 2022 08:00) (84 - 115)  RR: 25 (12 Aug 2022 08:00) (16 - 28)  SpO2: 96% (12 Aug 2022 08:00) (94% - 100%)    Parameters below as of 12 Aug 2022 07:00  Patient On (Oxygen Delivery Method): room air        I&O's Summary    11 Aug 2022 07:01  -  12 Aug 2022 07:00  --------------------------------------------------------  IN: 1600 mL / OUT: 3610 mL / NET: -2010 mL                              8.1    17.42 )-----------( 336      ( 12 Aug 2022 05:06 )             25.7     08-12    135  |  96  |  41<H>  ----------------------------<  122<H>  4.1   |  28  |  2.91<H>    Ca    8.4      12 Aug 2022 05:06  Phos  2.6     08-12  Mg     2.0     08-12    TPro  6.1  /  Alb  2.5<L>  /  TBili  0.3  /  DBili  0.2  /  AST  16  /  ALT  13  /  AlkPhos  243<H>  08-12          Culture - Blood (collected 08-09-22 @ 18:32)  Source: .Blood Blood-Peripheral  Preliminary Report (08-11-22 @ 02:01):    No growth to date.    Culture - Blood (collected 08-09-22 @ 18:32)  Source: .Blood Blood-Peripheral  Preliminary Report (08-11-22 @ 02:01):    No growth to date.    Culture - Tissue with Gram Stain (collected 08-08-22 @ 17:44)  Source: .Tissue Other  Gram Stain (08-09-22 @ 02:45):    No polymorphonuclear cells seen seen per low power field    No organisms seen per oil power field  Preliminary Report (08-09-22 @ 20:16):    No growth    Culture - Acid Fast - Tissue w/Smear (collected 08-08-22 @ 17:44)  Source: .Tissue Other  Preliminary Report (08-10-22 @ 15:04):    Culture is being performed.    Culture - Fungal, Tissue (collected 08-08-22 @ 17:44)  Source: .Tissue Other  Preliminary Report (08-09-22 @ 08:13):    Testing in progress    Culture - Blood (collected 08-08-22 @ 04:01)  Source: .Blood Blood-Peripheral  Preliminary Report (08-09-22 @ 12:01):    No growth to date.    Culture - Blood (collected 08-08-22 @ 04:01)  Source: .Blood Blood-Peripheral  Gram Stain (08-09-22 @ 15:30):    Growth in aerobic bottle: Gram Positive Cocci in Clusters  Final Report (08-11-22 @ 16:54):    Growth in aerobic bottle: Staphylococcus hominis    Growth in aerobic bottle: Staphylococcus epidermidis    Coag Negative Staphylococcus    Single set isolate, possible contaminant. Contact    Microbiology if susceptibility testing clinically    indicated.    ***Blood Panel PCR results on this specimen are available    approximately 3 hours after the Gram stain result.***    Gram stain, PCR, and/or culture results may not always    correspond due to difference in methodologies.    ************************************************************    This PCR assay was performed by multiplex PCR. This    Assay tests for 66 bacterial and resistance gene targets.    Please refer to the [facility] test directory    at [email or web address] for details.  Organism: Blood Culture PCR (08-11-22 @ 16:54)  Organism: Blood Culture PCR (08-11-22 @ 16:54)                    REVIEW OF SYSTEMS  --------------------------------------------------------------------------------  unable to obtain full accurate ROS, but denies CP, SOB.    PHYSICAL EXAM:  Constitutional: awake, weak, not coherent  Eyes: Anicteric  ENMT: nc/at  Respiratory: not tachypneic, non-labored on RA    Cardiovascular: normotensive, regular rate on monitor   Gastrointestinal: Soft, non distended, nontender, RLQ incisional/groin ecchymotic.   Genitourinary: anuric on HD   Extremities: SCD's in place and working bilaterally, overall with worsening edema/anasarca  Vascular: Palpable dp pulses bilaterally  Neurological: AAOx2  Skin: no rashes, ulcerations or lesions  Musculoskeletal: Moving all extremities  Psychiatric: overall calm, but with  periods of agitation

## 2022-08-12 NOTE — PROGRESS NOTE ADULT - SUBJECTIVE AND OBJECTIVE BOX
Follow Up:      Interval History:    REVIEW OF SYSTEMS  [  ] ROS unobtainable because:    [  ] All other systems negative except as noted below    Constitutional:  [ ] fever [ ] chills  [ ] weight loss  [ ] weakness  Skin:  [ ] rash [ ] phlebitis	  Eyes: [ ] icterus [ ] pain  [ ] discharge	  ENMT: [ ] sore throat  [ ] thrush [ ] ulcers [ ] exudates  Respiratory: [ ] dyspnea [ ] hemoptysis [ ] cough [ ] sputum	  Cardiovascular:  [ ] chest pain [ ] palpitations [ ] edema	  Gastrointestinal:  [ ] nausea [ ] vomiting [ ] diarrhea [ ] constipation [ ] pain	  Genitourinary:  [ ] dysuria [ ] frequency [ ] hematuria [ ] discharge [ ] flank pain  [ ] incontinence  Musculoskeletal:  [ ] myalgias [ ] arthralgias [ ] arthritis  [ ] back pain  Neurological:  [ ] headache [ ] seizures  [ ] confusion/altered mental status    Allergies  No Known Allergies        ANTIMICROBIALS:  nystatin    Suspension 018924 four times a day  trimethoprim   80 mG/sulfamethoxazole 400 mG 1 daily  valGANciclovir 450 <User Schedule>      OTHER MEDS:  MEDICATIONS  (STANDING):  acetaminophen     Tablet .. 650 every 6 hours PRN  amLODIPine   Tablet 10 daily  doxazosin 4 <User Schedule>  epoetin cortney-epbx (RETACRIT) Injectable 02739 every 7 days  finasteride 5 daily  fosphenytoin IVPB 200 every 12 hours  heparin   Injectable 5000 every 8 hours  hydrALAZINE 25 every 8 hours  insulin glargine Injectable (LANTUS) 6 at bedtime  insulin lispro (ADMELOG) corrective regimen sliding scale  three times a day before meals  insulin lispro (ADMELOG) corrective regimen sliding scale  at bedtime  insulin lispro Injectable (ADMELOG) 3 three times a day before meals  levothyroxine 50 daily  melatonin 6 at bedtime  mycophenolate mofetil 250 <User Schedule>  pantoprazole    Tablet 40 <User Schedule>  polyethylene glycol 3350 17 daily  predniSONE   Tablet 20 daily  senna 2 at bedtime  sirolimus 3.5 <User Schedule>      Vital Signs Last 24 Hrs  T(C): 37.5 (12 Aug 2022 07:00), Max: 37.5 (12 Aug 2022 03:00)  T(F): 99.5 (12 Aug 2022 07:00), Max: 99.5 (12 Aug 2022 03:00)  HR: 71 (12 Aug 2022 11:00) (67 - 79)  BP: 145/64 (12 Aug 2022 11:00) (124/59 - 178/74)  BP(mean): 92 (12 Aug 2022 11:00) (80 - 115)  RR: 17 (12 Aug 2022 11:00) (15 - 28)  SpO2: 98% (12 Aug 2022 11:00) (95% - 100%)    Parameters below as of 12 Aug 2022 07:00  Patient On (Oxygen Delivery Method): room air        PHYSICAL EXAMINATION:  General: Alert and Awake, NAD  HEENT: PERRL, EOMI  Neck: Supple  Cardiac: RRR, No M/R/G  Resp: CTAB, No Wh/Rh/Ra  Abdomen: NBS, NT/ND, No HSM, No rigidity or guarding  MSK: No LE edema. No Calf tenderness  : No tucker  Skin: No rashes or lesions. Skin is warm and dry to the touch.   Neuro: Alert and Awake. CN 2-12 Grossly intact. Moves all four extremities spontaneously.  Psych: Calm, Pleasant, Cooperative                          8.1    17.42 )-----------( 336      ( 12 Aug 2022 05:06 )             25.7       08-12    135  |  96  |  41<H>  ----------------------------<  122<H>  4.1   |  28  |  2.91<H>    Ca    8.4      12 Aug 2022 05:06  Phos  2.6     08-12  Mg     2.0     08-12    TPro  6.1  /  Alb  2.5<L>  /  TBili  0.3  /  DBili  0.2  /  AST  16  /  ALT  13  /  AlkPhos  243<H>  08-12          MICROBIOLOGY:  Vancomycin Level, Random: 22.9 ug/mL (08-12-22 @ 05:06)  v  .Blood Blood-Peripheral  08-09-22   No growth to date.  --  --      .Tissue Other  08-08-22   Testing in progress  --    No polymorphonuclear cells seen seen per low power field  No organisms seen per oil power field      .Blood Blood-Peripheral  08-08-22   Growth in aerobic bottle: Staphylococcus hominis  Growth in aerobic bottle: Staphylococcus epidermidis  Coag Negative Staphylococcus  Single set isolate, possible contaminant. Contact  Microbiology if susceptibility testing clinically  indicated.  ***Blood Panel PCR results on this specimen are available  approximately 3 hours after the Gram stain result.***  Gram stain, PCR, and/or culture results may not always  correspond due to difference in methodologies.  ************************************************************  This PCR assay was performed by multiplex PCR. This  Assay tests for 66 bacterial and resistance gene targets.  Please refer to the [facility] test directory  at [email or web address] for details.  --  Blood Culture PCR      .Blood Blood  08-03-22   No Growth Final  --  --      .Blood Blood-Peripheral  07-30-22   No Growth Final  --  --      .Blood Blood-Peripheral  07-30-22   No Growth Final  --  --      .Blood Blood  07-27-22   No growth  --  --      Catheterized Catheterized  07-25-22   50,000 - 99,000 CFU/mL Klebsiella pneumoniae ESBL  --  Klebsiella pneumoniae ESBL      .Blood Blood  07-25-22   No Growth Final  --  --      .Blood Blood  07-25-22   No Growth Final  --  --      .Blood Blood  07-15-22   No Growth Final  --  --      .Blood Blood  07-15-22   No Growth Final  --  --                RADIOLOGY:    <The imaging below has been reviewed and visualized by me independently. Findings as detailed in report below> Follow Up:  Abdominal Wall Cellulitis    Interval History: afebrile. alert today. pain at RLQ abdominal wall stable.     REVIEW OF SYSTEMS  [  ] ROS unobtainable because:    [ x ] All other systems negative except as noted below    Constitutional:  [ ] fever [ ] chills  [ ] weight loss  [ ] weakness  Skin:  [ x] rash [ ] phlebitis	  Eyes: [ ] icterus [ ] pain  [ ] discharge	  ENMT: [ ] sore throat  [ ] thrush [ ] ulcers [ ] exudates  Respiratory: [ ] dyspnea [ ] hemoptysis [ ] cough [ ] sputum	  Cardiovascular:  [ ] chest pain [ ] palpitations [ ] edema	  Gastrointestinal:  [ ] nausea [ ] vomiting [ ] diarrhea [ ] constipation [ ] pain	  Genitourinary:  [ ] dysuria [ ] frequency [ ] hematuria [ ] discharge [ ] flank pain  [ ] incontinence  Musculoskeletal:  [ ] myalgias [ ] arthralgias [ ] arthritis  [ ] back pain  Neurological:  [ ] headache [ ] seizures  [ ] confusion/altered mental status    Allergies  No Known Allergies        ANTIMICROBIALS:  nystatin    Suspension 422158 four times a day  trimethoprim   80 mG/sulfamethoxazole 400 mG 1 daily  valGANciclovir 450 <User Schedule>      OTHER MEDS:  MEDICATIONS  (STANDING):  acetaminophen     Tablet .. 650 every 6 hours PRN  amLODIPine   Tablet 10 daily  doxazosin 4 <User Schedule>  epoetin cortney-epbx (RETACRIT) Injectable 33657 every 7 days  finasteride 5 daily  fosphenytoin IVPB 200 every 12 hours  heparin   Injectable 5000 every 8 hours  hydrALAZINE 25 every 8 hours  insulin glargine Injectable (LANTUS) 6 at bedtime  insulin lispro (ADMELOG) corrective regimen sliding scale  three times a day before meals  insulin lispro (ADMELOG) corrective regimen sliding scale  at bedtime  insulin lispro Injectable (ADMELOG) 3 three times a day before meals  levothyroxine 50 daily  melatonin 6 at bedtime  mycophenolate mofetil 250 <User Schedule>  pantoprazole    Tablet 40 <User Schedule>  polyethylene glycol 3350 17 daily  predniSONE   Tablet 20 daily  senna 2 at bedtime  sirolimus 3.5 <User Schedule>      Vital Signs Last 24 Hrs  T(C): 37.5 (12 Aug 2022 07:00), Max: 37.5 (12 Aug 2022 03:00)  T(F): 99.5 (12 Aug 2022 07:00), Max: 99.5 (12 Aug 2022 03:00)  HR: 71 (12 Aug 2022 11:00) (67 - 79)  BP: 145/64 (12 Aug 2022 11:00) (124/59 - 178/74)  BP(mean): 92 (12 Aug 2022 11:00) (80 - 115)  RR: 17 (12 Aug 2022 11:00) (15 - 28)  SpO2: 98% (12 Aug 2022 11:00) (95% - 100%)    Parameters below as of 12 Aug 2022 07:00  Patient On (Oxygen Delivery Method): room air    PHYSICAL EXAMINATION:  General: Alert and Awake, NAD  Cardiac: RRR, No M/R/G  Resp: CTAB, No Wh/Rh/Ra  Abdomen: RLQ with erythema and induration overlying the renal transplant site with extension into the groin.   MSK: No LE edema. No Calf tenderness  Skin: No rashes or lesions. Skin is warm and dry to the touch.   Neuro: Alert and Awake. CN 2-12 Grossly intact. Moves all four extremities spontaneously.  Psych: Calm, Pleasant, Cooperative                          8.1    17.42 )-----------( 336      ( 12 Aug 2022 05:06 )             25.7       08-12    135  |  96  |  41<H>  ----------------------------<  122<H>  4.1   |  28  |  2.91<H>    Ca    8.4      12 Aug 2022 05:06  Phos  2.6     08-12  Mg     2.0     08-12    TPro  6.1  /  Alb  2.5<L>  /  TBili  0.3  /  DBili  0.2  /  AST  16  /  ALT  13  /  AlkPhos  243<H>  08-12          MICROBIOLOGY:  Vancomycin Level, Random: 22.9 ug/mL (08-12-22 @ 05:06)  v  .Blood Blood-Peripheral  08-09-22   No growth to date.  --  --      .Tissue Other  08-08-22   Testing in progress  --    No polymorphonuclear cells seen seen per low power field  No organisms seen per oil power field      .Blood Blood-Peripheral  08-08-22   Growth in aerobic bottle: Staphylococcus hominis  Growth in aerobic bottle: Staphylococcus epidermidis  Coag Negative Staphylococcus  Single set isolate, possible contaminant. Contact  Microbiology if susceptibility testing clinically  indicated.  ***Blood Panel PCR results on this specimen are available  approximately 3 hours after the Gram stain result.***  Gram stain, PCR, and/or culture results may not always  correspond due to difference in methodologies.  ************************************************************  This PCR assay was performed by multiplex PCR. This  Assay tests for 66 bacterial and resistance gene targets.  Please refer to the [facility] test directory  at [email or web address] for details.  --  Blood Culture PCR      .Blood Blood  08-03-22   No Growth Final  --  --      .Blood Blood-Peripheral  07-30-22   No Growth Final  --  --      .Blood Blood-Peripheral  07-30-22   No Growth Final  --  --      .Blood Blood  07-27-22   No growth  --  --      Catheterized Catheterized  07-25-22   50,000 - 99,000 CFU/mL Klebsiella pneumoniae ESBL  --  Klebsiella pneumoniae ESBL      .Blood Blood  07-25-22   No Growth Final  --  --      .Blood Blood  07-25-22   No Growth Final  --  --      .Blood Blood  07-15-22   No Growth Final  --  --      .Blood Blood  07-15-22   No Growth Final  --  --    RADIOLOGY:    <The imaging below has been reviewed and visualized by me independently. Findings as detailed in report below>    < from: [facility] Duplex Lower Extrem Arterial, Bilat (08.10.22 @ 13:09) >  Impression: Lower extremity arterial vascular disease is NOT identified.    < end of copied text >

## 2022-08-12 NOTE — PROGRESS NOTE ADULT - ASSESSMENT
66 year old male with PMH of  DM type II, HTN, CAD s/p CABG in 2020, Afib on Eliquis, CVA in 2019 due to Afib, Seizure d/o (last episode was on 4/8/22), h/o GI bleed in 2/2022 EGD with duodenal ulcer and ESRD on HD s/p R DDRT from DCD donor on 4/21/22 complicated by DGF requiring HD until 4/29/22, later had good graft function who was readmitted with status epilepticus in setting of UTI/antibiotics and sub-therapeutic valproic acid level.  Transferred to SICU on 7/25 with signs of sepsis. 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 Received pulse steroids (Solumedrol 500 on 7/30 -> 250 on 7/31).     1. 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 No  DSA.   s/p pulse dose steroids. Thymo was not given to treat the rejection as he is too frail and at increased risk for infections. Now with failed allograft function, Remains anuric and is dialysis dependent.   Pt underwent IR guided HD catheter exchange on 8/8/22 Last HD done on 8/11 with UF of 3L. Pt with volume overload and anuric. Plan for UF with goal of 3 kgs. Monitor for labs and urineoutput. Bladder ray prn.      2. IS meds- was on Belatacept. now on Sirolimus ,  mg po bid and steroid taper.     3. AMS , seizures - had 3 episodes of seizures on 8/3. CT head on 8/3 was negative. On Fosphenytoin. No further episodes of seizures since 8/3. Neurology following.    4. DM -  on Lantus and lispro.     5. Cellulitis over RLQ - WBC stable. On Vancomycin . CT A/P on 8/6 revealed a retroperitoneal hematoma. Txp USG 8/6  - Patent renal artery vasculature. Stable appearance the transplant kidney .Persistent elevated resistive indices  Diffuse abdominal wall edematous changes right lower quadrant without focal collection, cultures from 8/3  negative so far. Skin bx done on 8/8/22 showed no significant findings. ID follow up. Follow Fungitell,. Continue Fluconazole. Monitor fevers and labs.    6.  ESBL klebsiella UTI (7/15 + UCx, 7/25 +UCx)- completed a course of  Ertapenam on  8/3 + Fluconazole.    7. LE edema: Pt with B/L LE/UE swelling R>L, doppler studies negative of DVT. Continue Subq Heparin. Cannot use Eliquis given interaction with Dilantin.

## 2022-08-12 NOTE — CONSULT NOTE ADULT - ATTENDING COMMENTS
seen and examined 08-12-22 @ 1815    4/21/2022 @ [facility] - right iliac DDRT  5/2/2022 @ [facility] - evacuation of hematoma around allograft, suture repair of arterial anastomosis  6/15/2022 @ [facility] - right VATS / decortication for retained hemothorax with trapped lung  7/12/2022 @ [facility] - flexible cystoscopy with ureteral stent removal fro allograft    afeb  right groin / flank with blanching erythema, mild tenderness and significant induration seen and examined 08-12-22 @ 1815    4/21/2022 @ [facility] - right iliac DDRT  5/2/2022 @ [facility] - evacuation of hematoma around allograft, suture repair of arterial anastomosis  6/15/2022 @ [facility] - right VATS / decortication for retained hemothorax with trapped lung  7/12/2022 @ [facility] - flexible cystoscopy with ureteral stent removal fro allograft    afeb  right groin / flank with blanching erythema, mild tenderness and significant induration  healed RLQ Erwin incision    WBC = 17  CRP = 256  procalcitonin (8/12) - 0.9    BCx (8/8) - Staph hominis, methicillin resistant Staph epidermidis (1/2 sets)  BCx (8/9) - NGTD x 2 sets    RLQ punch biopsy culture (8/8) - NGTD  RLQ punch biopsy fungal culture (8/8) - NGTD  RLQ punch biopsy AFB (8/8) - no organisms seen on AFB stain, culture pending    RLQ punch biopsy (8/8) - edema, telangiectasia and sparse mixed infiltrate of lymphocytes and neutrophils (consistent with cellulitis)      Open biopsy of right flank muscles would have a high risk of wound complications and nonhealing since the tissue is very indurated.  While open muscle biopsy is generally required for the diagnosis of neuromuscular pathology, but I don't believe that it is necessary to evaluate for pyomyositis or fungal myositis.  -recommend percutaneous core needle muscle biopsy by IR

## 2022-08-12 NOTE — PROGRESS NOTE ADULT - NUTRITIONAL ASSESSMENT

## 2022-08-12 NOTE — PROGRESS NOTE ADULT - ATTENDING COMMENTS
65 yo m, s/p DDKT, DGF.     - Continue antiseizure regimen, neurology following.  - Hypoglycemia, insulin adjusted by endocrinology.   - /24h. HD today. Nephrology following.  - Sirolimus, MMF, prednisone. Bactrim, Valcyte, nystatin.  - AF, RLQ cellulitis improving. ID following, check vancomycin level.

## 2022-08-12 NOTE — PROGRESS NOTE ADULT - NUTRITIONAL ASSESSMENT
Diet, Soft and Bite Sized:   Consistent Carbohydrate {Evening Snack} (CSTCHOSN)  1000mL Fluid Restriction ([id number])  For patients receiving Renal Replacement - No Protein Restr, No Conc K, No Conc Phos, Low Sodium (RENAL)  Supplement Feeding Modality:  Oral  Nepro Cans or Servings Per Day:  3       Frequency:  Three Times a day (08-10-22 @ 11:07) [Active]

## 2022-08-12 NOTE — PROGRESS NOTE ADULT - SUBJECTIVE AND OBJECTIVE BOX
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 was seen and examined today. Overnight events  noted. Pt continues to remains volume overload. Last Hd done on 8/11/22.    PAST HISTORY  --------------------------------------------------------------------------------  No significant changes to PMH, PSH, FHx, SHx, unless otherwise noted    ALLERGIES & MEDICATIONS  --------------------------------------------------------------------------------  Allergies    No Known Allergies    Intolerances      Standing Inpatient Medications  amLODIPine   Tablet 10 milliGRAM(s) Oral daily  chlorhexidine 2% Cloths 1 Application(s) Topical <User Schedule>  doxazosin 4 milliGRAM(s) Oral <User Schedule>  epoetin cortney-epbx (RETACRIT) Injectable 99235 Unit(s) SubCutaneous every 7 days  finasteride 5 milliGRAM(s) Oral daily  fosphenytoin IVPB 200 milliGRAM(s) PE IV Intermittent every 12 hours  heparin   Injectable 5000 Unit(s) SubCutaneous every 8 hours  hydrALAZINE 25 milliGRAM(s) Oral every 8 hours  insulin glargine Injectable (LANTUS) 6 Unit(s) SubCutaneous at bedtime  insulin lispro (ADMELOG) corrective regimen sliding scale   SubCutaneous three times a day before meals  insulin lispro (ADMELOG) corrective regimen sliding scale   SubCutaneous at bedtime  insulin lispro Injectable (ADMELOG) 3 Unit(s) SubCutaneous three times a day before meals  levothyroxine 50 MICROGram(s) Oral daily  melatonin 6 milliGRAM(s) Oral at bedtime  mycophenolate mofetil 250 milliGRAM(s) Oral <User Schedule>  nystatin    Suspension 380667 Unit(s) Oral four times a day  pantoprazole    Tablet 40 milliGRAM(s) Oral <User Schedule>  petrolatum white Ointment 1 Application(s) Topical two times a day  polyethylene glycol 3350 17 Gram(s) Oral daily  predniSONE   Tablet 20 milliGRAM(s) Oral daily  senna 2 Tablet(s) Oral at bedtime  sirolimus 3.5 milliGRAM(s) Oral <User Schedule>  trimethoprim   80 mG/sulfamethoxazole 400 mG 1 Tablet(s) Oral daily  valGANciclovir 450 milliGRAM(s) Oral <User Schedule>    PRN Inpatient Medications  acetaminophen     Tablet .. 650 milliGRAM(s) Oral every 6 hours PRN      REVIEW OF SYSTEMS  --------------------------------------------------------------------------------  Gen: anasarca+  Head/Eyes/Ears: No HA   Respiratory: No dyspnea, cough  CV: No chest pain  GI: No abdominal pain, diarrhea, as per hPI  : No dysuria, hematuria  MSK: LE edema+  Skin: No rashes  Heme: No easy bruising or bleeding    All other systems were reviewed and are negative, except as noted.    VITALS/PHYSICAL EXAM  --------------------------------------------------------------------------------  T(C): 37.5 (08-12-22 @ 07:00), Max: 37.5 (08-12-22 @ 03:00)  HR: 77 (08-12-22 @ 08:00) (67 - 79)  BP: 149/65 (08-12-22 @ 08:00) (124/59 - 178/74)  RR: 25 (08-12-22 @ 08:00) (16 - 28)  SpO2: 96% (08-12-22 @ 08:00) (94% - 100%)  Wt(kg): --      08-11-22 @ 07:01  -  08-12-22 @ 07:00  --------------------------------------------------------  IN: 1600 mL / OUT: 3610 mL / NET: -2010 mL      Physical Exam:  	Gen: weak appearing elderly male  	HEENT: Anicteric  	Pulm: CTA B/L  	CV: S1S2+  	Abd: Soft, +BS               Transplant site: RLQ non tender, surrounding erythema over the transplant site+,  	Ext: LE edema B/L++  	Neuro: Awake  	Skin: Warm and dry  	Dialysis access: Left non tunneled IJ catheter+, site clear, no bleeding noted      LABS/STUDIES  --------------------------------------------------------------------------------              8.1    17.42 >-----------<  336      [08-12-22 @ 05:06]              25.7     135  |  96  |  41  ----------------------------<  122      [08-12-22 @ 05:06]  4.1   |  28  |  2.91        Ca     8.4     [08-12-22 @ 05:06]      Mg     2.0     [08-12-22 @ 05:06]      Phos  2.6     [08-12-22 @ 05:06]    Creatinine Trend:  SCr 2.91 [08-12 @ 05:06]  SCr 3.48 [08-11 @ 05:37]  SCr 2.71 [08-10 @ 17:30]  SCr 4.25 [08-10 @ 05:57]  SCr 3.70 [08-09 @ 17:58]    Urinalysis - [07-25-22 @ 18:28]      Color Yellow / Appearance Slightly Turbid / SG 1.020 / pH 5.5      Gluc Negative / Ketone Negative  / Bili Negative / Urobili Negative       Blood Small / Protein 100 / Leuk Est Large / Nitrite Negative      RBC 2 / WBC 40 / Hyaline 5 / Gran  / Sq Epi  / Non Sq Epi 0 / Bacteria Many      HBsAg Nonreact      [07-25-22 @ 11:51]  HCV 0.18, Nonreact      [07-25-22 @ 11:51]    SirolimusSirolimus (Rapamycin) Level, Serum: 4.6 ng/mL (08-12 @ 05:06)  Sirolimus (Rapamycin) Level, Serum: 3.6 ng/mL (08-11 @ 05:38)  Sirolimus (Rapamycin) Level, Serum: 3.8 ng/mL (08-10 @ 05:57)  Sirolimus (Rapamycin) Level, Serum: 3.8 ng/mL (08-09 @ 05:52)  Sirolimus (Rapamycin) Level, Serum: 4.0 ng/mL (08-08 @ 05:42)  Sirolimus (Rapamycin) Level, Serum: 3.5 ng/mL (08-07 @ 06:05)  Sirolimus (Rapamycin) Level, Serum: 3.4 ng/mL (08-06 @ 05:37)  Sirolimus (Rapamycin) Level, Serum: 3.4 ng/mL (08-05 @ 04:17)  Sirolimus (Rapamycin) Level, Serum: 5.0 ng/mL (08-03 @ 22:24)  Sirolimus (Rapamycin) Level, Serum: 6.1 ng/mL (08-02 @ 23:18)  Sirolimus (Rapamycin) Level, Serum: 6.7 ng/mL (08-01 @ 23:41)    CMV PCRCMVPCR Log: NotDetec Ohw58RK/mL (07-22 @ 06:18)  CMVPCR Log: NotDetec Loe32VQ/mL (06-26 @ 18:14)  CMVPCR Log: NotDetec Ufz82NS/mL (06-25 @ 14:08)    Parvo PCRParvovirus B19 DNA by PCR: NotDetec IU/mL (07-25 @ 18:32)  Parvovirus B19 DNA by PCR: NotDetec IU/mL (06-26 @ 18:14)

## 2022-08-12 NOTE — PROGRESS NOTE ADULT - ASSESSMENT
Patient is a 67 y/o male w/ a PMHx of CAD s/p CABG, AF on Eliquis c/b CVA c/b seizures, HTN, HLD, DM type II, hypothyroidism, GI bleed due to a duodenal ulcer, and ESRD s/p DDRT 4/21/22 c/b DGF requiring HD & large perinephric hematoma 5/22 s/p evacuation w/ revision of arterial anastomosis who presented in status epilepticus 6/10 from rehab requiring intubation for airway protection with a hospital course c/b large right pleural effusion s/p thoracentesis c/b hemothorax while being anticoagulated & requiring intubation for airway protection s/p chest tube placement w/ CT demonstrating retained hemothorax s/p right VATS, status epilepticus again causing AMS requiring intubation for airway protection again, delirium, dysphagia requiring NGT placement for enteral access, hyperkalemia, and poor glycemic control. 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     Neuro: rencephalopathy improved, hx of seizure, with refractory seizure  - c/w Fosphenytoin 200mg IV BID, following levels. Will draw level 1 hour before AM dose today  - Multimodal pain control w/Tylenol  - Melatonin with Protected sleep to improve delirium  - Neurology following, recs appreciated     Resp: prevent atelectasis  - Maintain SpO2 >92%   - Out of bed to chair, ambulate as tolerated, and incentive spirometry to prevent atelectasis    CVS: hx of HTN, s/p CABG, AF on Eliquis  - Maintain MAP > 65  - Continue Hydralazine 25mg q8h and Amlodipine 10mg qd   - lactate cleared    GI: transaminitis improved  - passed swallow, non soft and bite sized diet  - Protonix 40mg IV daily for ppx   - Bowel regimen with Miralax,  senna     Renal: DDRT 4/21 cb DGF, now c/b acute rejection s/p IR renal bx 7/29  - Last HD 8/10 removed 3L  - Doxazosin + Proscar for BPH  - Continue immunosuppression with sirolimus 2.5mg bid , cellcept 250mg bid prednisone 20mg     Heme:  - Monitor CBC and coags  - SCDs for mechanical VTE ppx   -Heparin SC q12  - DVT studies performed and found unremarkable for DVTs    ID: ESBL klebsiella UTI (7/15 + UCx, 7/25 +UCx)  -c/w Vanco by level due to leukocytosis with surrounding erythema of Right groin incision per Transplant ID.   - One blood culture bottle grew Staph epidermidis MR. Repeat blood cultures sent and found with NGTD  - Skin biopsy results unremarkable for lymphoma  - Bactrim, Valcyte, and Nystatin for immuno ppx    Endo: hx of T2DM, Hypothyroidism,   - Episode of hypoglycemia in the day s/p dextrose.   - 9 units lantus ordered for bedtime, 3u lispro premeal. ISS.   - Continue Home Synthroid     Code status: Full Code   Dispo: SICU      Patient is a 65 y/o male w/ a PMHx of CAD s/p CABG, AF on Eliquis c/b CVA c/b seizures, HTN, HLD, DM type II, hypothyroidism, GI bleed due to a duodenal ulcer, and ESRD s/p DDRT 4/21/22 c/b DGF requiring HD & large perinephric hematoma 5/22 s/p evacuation w/ revision of arterial anastomosis who presented in status epilepticus 6/10 from rehab requiring intubation for airway protection with a hospital course c/b large right pleural effusion s/p thoracentesis c/b hemothorax while being anticoagulated & requiring intubation for airway protection s/p chest tube placement w/ CT demonstrating retained hemothorax s/p right VATS, status epilepticus again causing AMS requiring intubation for airway protection again, delirium, dysphagia requiring NGT placement for enteral access, hyperkalemia, and poor glycemic control. 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     Neuro: rencephalopathy improved, hx of seizure, with refractory seizure  - c/w Fosphenytoin 200mg IV BID, following levels. Will draw level 1 hour before AM dose today  - Multimodal pain control w/Tylenol  - Melatonin with Protected sleep to improve delirium  - Neurology following, recs appreciated     Resp: prevent atelectasis  - Maintain SpO2 >92%   - Out of bed to chair, ambulate as tolerated, and incentive spirometry to prevent atelectasis    CVS: hx of HTN, s/p CABG, AF on Eliquis  - Maintain MAP > 65  - Continue Hydralazine 25mg q8h and Amlodipine 10mg qd   - lactate cleared    GI: transaminitis improved  - passed swallow, non soft and bite sized diet  - Protonix 40mg IV daily for ppx   - Bowel regimen with Miralax,  senna     Renal: DDRT 4/21 cb DGF, now c/b acute rejection s/p IR renal bx 7/29  - Last HD 8/10 removed 3L  -HD today  - Doxazosin + Proscar for BPH  - Continue immunosuppression with sirolimus 2.5mg bid , cellcept 250mg bid prednisone 20mg     Heme:  - Monitor CBC and coags  - SCDs for mechanical VTE ppx   -Heparin SC q12  - DVT studies performed and found unremarkable for DVTs    ID: ESBL klebsiella UTI (7/15 + UCx, 7/25 +UCx)  -c/w Vanco by level due to leukocytosis with surrounding erythema of Right groin incision per Transplant ID.   - One blood culture bottle grew Staph epidermidis MR. Repeat blood cultures sent and found with NGTD  - Skin biopsy results unremarkable for lymphoma  - Bactrim, Valcyte, and Nystatin for immuno ppx    Endo: hx of T2DM, Hypothyroidism,   - Episode of hypoglycemia in the day s/p dextrose.   - 6 units lantus ordered for bedtime, 3u lispro premeal. ISS.   - Continue Home Synthroid     Code status: Full Code   Dispo: SICU

## 2022-08-12 NOTE — PROGRESS NOTE ADULT - ASSESSMENT
66M with h/o DM type II, HTN, CAD s/p CABG in 2020, Afib on Eliquis, CVA in 2019 due to Afib, Seizure d/o (last episode was on 4/8/22), h/o GI bleed in 2/2022 EGD with duodenal ulcer and ESRD on HD s/p R DDRT from DCD donor on 4/21/22 complicated by DGF requiring HD until 4/29/22 who presented with status epilepticus in setting of UTI/antibiotics and sub-therapeutic valproic acid level. Transferred to SICU on 7/25 with signs of sepsis, now with refractory seizures    Seizure Disorder:  - refractory seizures o/n.  Neuro/Epilepsy Teams following. Follow recommendations closely. continue EEG. Avoid Fycompa 2/2 agitation in past  - prior MRI head 6/27 with less concerns for PRES, likely ischemic changes  - o/n CTH 8/3 with no acute findings  - replete lytes aggressively  -FU fosphenytoin level  - FU Free fosphenytoin level    R DCD DDRT 4/21/22 now with 2A ACR  - 2A ACR: s/p pulse steroids, now on 20mg QD will keep   - will continue HD daily per renal given persistent fluid overload and anuria  - Scrotal support  - On vanc by level for ppx (level today 16.5). Bld cx from 8/3 w/ NGTD  - graft doppler stable, stable perinephric collection last US 8/5 and CT scan 8/8  - S/P removal of ureteral stent on 7/12  - Continue Doxazosin/Proscar for BPH  - OOB with RN/PT  - DSA negative   - ADAT. 1L fluid restriction  - follow up punch biopsy of the wound: 8/8 Culture ngtd  - DVT ppx with SQH  - fungitel negative  - LE studies WNL  - will obtain right psoas muscle biopsy  Immunosuppression:   - Belatacept: 6/23, 7/4, 7/8. Re-loaded 7/8,  as there was a gap between doses 2/2 seizures. 7/18,  last dose 8/1  - on Sirolimus as of  8/2 per level    - Cellcept 250 BID  - continue Pred 20mg QD  PPX:  diflucan 400mg QD, bactrim, valcyte (MW)  - PPx: Valcyte/ bactrim     DM  - steroid induced hyperglycemia, improving with Endocrine management    AFib/R IJ DVT   - Eliquis with ~40% less efficacy while on fosphenytoin.    - Lovenox held for ANA and transaminitis   - rate controlled  - will discuss A/C plans daily    HTN:  - Amlodipine/Doxazosin/Hydralazline  - off Coreg due to bradycardia    DISPO  - continue SICU care, possible downgrade today

## 2022-08-12 NOTE — CONSULT NOTE ADULT - SUBJECTIVE AND OBJECTIVE BOX
General Surgery Consult  Consulting surgical team: Transplant Surgery  Consulting attending: Ryan Zamudio    HPI:  67 y/o male w/ a PMHx of CAD s/p CABG, AF on Eliquis c/b CVA c/b seizures, HTN, HLD, DM type II, hypothyroidism, GI bleed due to a duodenal ulcer, and ESRD s/p DDRT 4/21/22 c/b DGF requiring HD & large perinephric hematoma 5/22 s/p evacuation w/ revision of arterial anastomosis who presented in status epilepticus 6/10 from rehab requiring intubation for airway protection with a hospital course c/b large right pleural effusion s/p thoracentesis c/b hemothorax while being anticoagulated & requiring intubation for airway protection s/p chest tube placement w/ CT demonstrating retained hemothorax s/p right VATS, status epilepticus again causing AMS requiring intubation for airway protection again, delirium, dysphagia requiring NGT placement for enteral access, hyperkalemia, and poor glycemic control. 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  Patient presented with erythema around transplant incision site that began 1 week ago and has been expanding despite antibiotics s/p skin biopsy with negative results of cellulitis.     Surgery consulted for evaluation for muscle biopsy for microbiology PCR and eval of pyomyositis      PAST MEDICAL HISTORY:  Hypertension    Diabetes    Dyslipidemia    CAD (Coronary Artery Disease)    CAD (Coronary Artery Disease)    CRI (Chronic Renal Insufficiency)    Hypothyroidism    CVA (cerebral vascular accident)    Anemia    PEG (percutaneous endoscopic gastrostomy) status    Intubation of airway performed without difficulty    ESRD on dialysis    Seizures        PAST SURGICAL HISTORY:  No significant past surgical history    History of insertion of stent into coronary artery bypass graft    S/P CABG x 3        MEDICATIONS:  acetaminophen     Tablet .. 650 milliGRAM(s) Oral every 6 hours PRN  amLODIPine   Tablet 10 milliGRAM(s) Oral daily  chlorhexidine 2% Cloths 1 Application(s) Topical <User Schedule>  doxazosin 4 milliGRAM(s) Oral <User Schedule>  epoetin cortney-epbx (RETACRIT) Injectable 25070 Unit(s) SubCutaneous every 7 days  finasteride 5 milliGRAM(s) Oral daily  fosphenytoin IVPB 200 milliGRAM(s) PE IV Intermittent every 12 hours  heparin   Injectable 5000 Unit(s) SubCutaneous every 8 hours  hydrALAZINE 25 milliGRAM(s) Oral every 8 hours  insulin glargine Injectable (LANTUS) 6 Unit(s) SubCutaneous at bedtime  insulin lispro (ADMELOG) corrective regimen sliding scale   SubCutaneous three times a day before meals  insulin lispro (ADMELOG) corrective regimen sliding scale   SubCutaneous at bedtime  insulin lispro Injectable (ADMELOG) 3 Unit(s) SubCutaneous three times a day before meals  levothyroxine 50 MICROGram(s) Oral daily  melatonin 6 milliGRAM(s) Oral at bedtime  mycophenolate mofetil 250 milliGRAM(s) Oral <User Schedule>  nystatin    Suspension 138901 Unit(s) Oral four times a day  pantoprazole    Tablet 40 milliGRAM(s) Oral <User Schedule>  petrolatum white Ointment 1 Application(s) Topical two times a day  polyethylene glycol 3350 17 Gram(s) Oral daily  predniSONE   Tablet 20 milliGRAM(s) Oral daily  senna 2 Tablet(s) Oral at bedtime  sirolimus 3.5 milliGRAM(s) Oral <User Schedule>  trimethoprim   80 mG/sulfamethoxazole 400 mG 1 Tablet(s) Oral daily  valGANciclovir 450 milliGRAM(s) Oral <User Schedule>      ALLERGIES:  No Known Allergies      VITALS & I/Os:  Vital Signs Last 24 Hrs  T(C): 37.1 (12 Aug 2022 15:00), Max: 37.5 (12 Aug 2022 03:00)  T(F): 98.8 (12 Aug 2022 15:00), Max: 99.5 (12 Aug 2022 03:00)  HR: 70 (12 Aug 2022 17:00) (67 - 79)  BP: 176/76 (12 Aug 2022 17:00) (124/59 - 176/76)  BP(mean): 109 (12 Aug 2022 17:00) (80 - 119)  RR: 18 (12 Aug 2022 17:00) (15 - 28)  SpO2: 98% (12 Aug 2022 17:00) (95% - 100%)    Parameters below as of 12 Aug 2022 07:00  Patient On (Oxygen Delivery Method): room air        I&O's Summary    11 Aug 2022 07:01  -  12 Aug 2022 07:00  --------------------------------------------------------  IN: 1600 mL / OUT: 3610 mL / NET: -2010 mL    12 Aug 2022 07:01  -  12 Aug 2022 17:43  --------------------------------------------------------  IN: 600 mL / OUT: 50 mL / NET: 550 mL        PHYSICAL EXAM:  Gen: weak appearing elderly male  HEENT: Anicteric  Pulm: CTA B/L  CV: S1S2+  Abd: Soft, +BS  Transplant site: RLQ non tender, surrounding erythema over the transplant site+,  Ext: LE edema B/L++  Neuro: Awake  Skin: Warm and dry  Dialysis access: Left non tunneled IJ catheter+, site clear, no bleeding noted  LABS:                        8.1    17.42 )-----------( 336      ( 12 Aug 2022 05:06 )             25.7     08-12    135  |  96  |  41<H>  ----------------------------<  122<H>  4.1   |  28  |  2.91<H>    Ca    8.4      12 Aug 2022 05:06  Phos  2.6     08-12  Mg     2.0     08-12    TPro  6.1  /  Alb  2.5<L>  /  TBili  0.3  /  DBili  0.2  /  AST  16  /  ALT  13  /  AlkPhos  243<H>  08-12    Lactate:  08-12 @ 17:13  1.0    PT/INR - ( 12 Aug 2022 05:06 )   PT: 14.0 sec;   INR: 1.20 ratio         PTT - ( 12 Aug 2022 05:06 )  PTT:30.4 sec              IMAGING:

## 2022-08-13 NOTE — PROGRESS NOTE ADULT - ASSESSMENT
66 yr old Male with PMHx ESRD s/p permacath removal 5/10/22 s/p Rt DDRT 4/21/22,  CVA (2019), Afib on apixaban, seizure activity, CAD s/p stents, CABG (2020), HTN, HLD, DM on insulin, gastric/duodenal ulcer, recent hospitalization 4/28/22 -5/10/22 with weakness/anemia found to have perinephric hematoma requiring evacuation and repair of bleeding arterial anastomosis. Curently being treated for UTI with Levaquin Today after developing multiple sz episodes refractory to 5 mg versed IM (EMS) and 2 mg ativan IV in E.D. concerning for status epilepticus requiring intubation for hypoxic respiratory  failure. MICU Consult was called for hypoxic respiratory failure secondary to status epilepticus.  Nephrology consulted for renal failure.     A/P  DDRT on 04/21/22  Course complicated by DGF, was on HD until 4/29/22. Readmitted in May for anemia in the setting of large perinephric hematoma s/p evacuation and repair of arterial anastomosis on 5/02/22. Intra operative biopsy showed no rejection.  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   Now with failed allograft function, Remains anuric and is dialysis dependent.   continue Immunosuppression per transplant team  s/p HD with UF 8/12 - RRT per transplant team    transplant team on board   monitor BMP, U/O     HTN   acceptable   plan for UF today   BP manage by transplant team    monitor BP closely

## 2022-08-13 NOTE — PROGRESS NOTE ADULT - SUBJECTIVE AND OBJECTIVE BOX
Transplant Surgery - Multidisciplinary Progress Note  --------------------------------------------------------------  DDRT     4/21/2022             Present:  Patient seen with multidisciplinary team including Transplant Surgeon: Dr. Tena,  Nephrologist: Dr. NORBERTO Lomeli, NP Jose. Disciplines not in attendance will be notified of the plan.      66M with PMH: DM type II, HTN, CAD s/p CABG in 2020, AFib on Eliquis, CVA in 2019 due to Afib, Seizure d/o following CVA, last episode was on 4/8/22, h/o GIB in 2/2022 EGD with duodenal ulcer.  ESRD due to DM was on HD since 2019.     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  Donor was 58 , KDPI 82%, DCD, single vessels and ureter, HLA mismatch 1, 2, 2. No DSA, cPRA 0%. 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 Re-admitted in May for anemia in the setting of large perinephric hematoma s/p evacuation and repair of arterial anastomosis on 5/2/22. Intra operative biopsy showed no rejection, Creatinine ranging ~2mg/dL.    In Rehab:  He was recently dx with klebsiella UTI with Ertapenem - then switched to Levaquin    He also had parainfluenza pneumonia. Was at rehab progressing well.      Hospital course:  - 6/10: transferred from rehab facility for seizure status epilepticus. Intubated for respiratory protection and transferred to MICU.  EEG showed no seizure activity. Neuro consulted.   - 6/11: Extubated. Found to have R pleural effusion. s/p Thoracentesis 1.3L. Eliquis changed to heparin drip.  Post procedure drop in H&H. 5u PRBC, 2 u FFP.   - 6/11 evening: Transferred to SICU from MICU for further care in setting of hypoxia, significant R pleural effusion, anemia and hemodynamic instability  - 6/11 Intubated at 9pm and sedated on Precedex.  CT placed, 600ml blood drained.  1L total overnight, started pressors  - 6/11 Received Protamine for PTT >100 (was on Heparin drip started for AF), 2 u FFP and 5u PRBC total  - 6/13 s/p VATS 2.2L old blood removed; second chest tube placed  - 6/18-19: chest tubes removed  - 6/21: ?PRES vs. chronic ischemic changes on MRI, off Envarsus, switched to Belatacept 6/23 with good graft function  - 6/25: Tx to SICU for refractory seizures, improved with IV antiepileptic regimen  - 7/10: Downgraded from SICU to floor.   - 7/11: OR-->URETERAL STENT REMOVED  - 7/15: ESBL Kleb UTI (50-90K), completed Ertapenem x 3 Days  - 7/25: Tx to SICU for Sepsis/ elevated LFTS  - 7/27: Shiley placed for HD  - 7/28: ongoing fevers -> started Ertapenem/Fluc  - 7/29: HD restarted + 1U PRBC.  IR bx with 2A rejection, started pulse steroids. LFTs have improved  - 8/4: refractory seizures    Interval events:   - UF yesterday -3L.  Peripheral edema improving  - R sided erythema around wound stable w/ scrotal and penile edema relatively stable. Reports minimal disscomfort  - 8/8 S/P punch biopsy of wound by derm, without pathologic finding. Culture ngtd  - WBC downtrending 14 from 17.  On Vanco by level.  Random level 20.8 today.  Last dose 8/11      Immunosuppression  Induction:  Thymoglobulin                                        Maintenance:  - Belatacept: 6/23, 7/4, 7/8, 7/18. 8/1, now discontinued  - On Sirolimus as of 8/2  - Cellcept to 250 BID  - Pred 20mg po qd   - Ongoing monitoring for signs of rejection.    Potential Discharge date: pending clinical improvement.     Education:  Medications    Plan of care:  See Below      MEDICATIONS  (STANDING):  amLODIPine   Tablet 10 milliGRAM(s) Oral daily  chlorhexidine 2% Cloths 1 Application(s) Topical <User Schedule>  doxazosin 4 milliGRAM(s) Oral <User Schedule>  epoetin cortney-epbx (RETACRIT) Injectable 36645 Unit(s) SubCutaneous every 7 days  finasteride 5 milliGRAM(s) Oral daily  fosphenytoin IVPB 200 milliGRAM(s) PE IV Intermittent every 12 hours  heparin   Injectable 5000 Unit(s) SubCutaneous every 8 hours  hydrALAZINE 25 milliGRAM(s) Oral every 8 hours  insulin glargine Injectable (LANTUS) 6 Unit(s) SubCutaneous at bedtime  insulin lispro (ADMELOG) corrective regimen sliding scale   SubCutaneous three times a day before meals  insulin lispro (ADMELOG) corrective regimen sliding scale   SubCutaneous at bedtime  insulin lispro Injectable (ADMELOG) 3 Unit(s) SubCutaneous three times a day before meals  levothyroxine 50 MICROGram(s) Oral daily  melatonin 6 milliGRAM(s) Oral at bedtime  mycophenolate mofetil 250 milliGRAM(s) Oral <User Schedule>  nystatin    Suspension 340473 Unit(s) Oral four times a day  pantoprazole    Tablet 40 milliGRAM(s) Oral <User Schedule>  petrolatum white Ointment 1 Application(s) Topical two times a day  polyethylene glycol 3350 17 Gram(s) Oral daily  predniSONE   Tablet 20 milliGRAM(s) Oral daily  senna 2 Tablet(s) Oral at bedtime  sirolimus 3.5 milliGRAM(s) Oral <User Schedule>  trimethoprim   80 mG/sulfamethoxazole 400 mG 1 Tablet(s) Oral daily  valGANciclovir 450 milliGRAM(s) Oral <User Schedule>    MEDICATIONS  (PRN):  acetaminophen     Tablet .. 650 milliGRAM(s) Oral every 6 hours PRN Mild Pain (1 - 3)      PAST MEDICAL & SURGICAL HISTORY:  Hypertension      Diabetes      Dyslipidemia      CAD (Coronary Artery Disease)  with Stents in 06/2009 and 6/2019, s/p off-pump C3L on 8/13/20      Hypothyroidism      CVA (cerebral vascular accident)  12/13/19 with residual bilateral weakness      Anemia      PEG (percutaneous endoscopic gastrostomy) status  removed July 2020      Intubation of airway performed without difficulty  Dec 2019  CVA c/b status epilepticus requiring intubation and PEG in 12/2019-      ESRD on dialysis  M/W/F      Seizures  after CVA, last seizure was last week 4/07/22      History of insertion of stent into coronary artery bypass graft  2009 and 2019      S/P CABG x 3  off pump C3L on 8/13/20          Vital Signs Last 24 Hrs  T(C): 36.8 (13 Aug 2022 13:00), Max: 37.1 (12 Aug 2022 17:47)  T(F): 98.2 (13 Aug 2022 13:00), Max: 98.8 (12 Aug 2022 17:47)  HR: 67 (13 Aug 2022 13:54) (65 - 72)  BP: 159/70 (13 Aug 2022 13:54) (152/67 - 185/77)  BP(mean): 102 (12 Aug 2022 21:00) (97 - 109)  RR: 24 (13 Aug 2022 13:00) (17 - 24)  SpO2: 98% (13 Aug 2022 13:00) (97% - 100%)    Parameters below as of 13 Aug 2022 13:00  Patient On (Oxygen Delivery Method): room air        I&O's Summary    12 Aug 2022 07:01  -  13 Aug 2022 07:00  --------------------------------------------------------  IN: 1300 mL / OUT: 3050 mL / NET: -1750 mL    13 Aug 2022 07:01  -  13 Aug 2022 15:47  --------------------------------------------------------  IN: 600 mL / OUT: 75 mL / NET: 525 mL                              7.7    14.22 )-----------( 323      ( 13 Aug 2022 07:03 )             24.3     08-13    133<L>  |  93<L>  |  59<H>  ----------------------------<  128<H>  4.2   |  24  |  3.72<H>    Ca    8.6      13 Aug 2022 07:06  Phos  3.3     08-13  Mg     2.2     08-13    TPro  6.2  /  Alb  2.4<L>  /  TBili  0.2  /  DBili  0.2  /  AST  15  /  ALT  11  /  AlkPhos  231<H>  08-13          Culture - Blood (collected 08-09-22 @ 18:32)  Source: .Blood Blood-Peripheral  Preliminary Report (08-11-22 @ 02:01):    No growth to date.    Culture - Blood (collected 08-09-22 @ 18:32)  Source: .Blood Blood-Peripheral  Preliminary Report (08-11-22 @ 02:01):    No growth to date.    Culture - Tissue with Gram Stain (collected 08-08-22 @ 17:44)  Source: .Tissue Other  Gram Stain (08-09-22 @ 02:45):    No polymorphonuclear cells seen seen per low power field    No organisms seen per oil power field  Preliminary Report (08-09-22 @ 20:16):    No growth    Culture - Acid Fast - Tissue w/Smear (collected 08-08-22 @ 17:44)  Source: .Tissue Other  Preliminary Report (08-10-22 @ 15:04):    Culture is being performed.    Culture - Fungal, Tissue (collected 08-08-22 @ 17:44)  Source: .Tissue Other  Preliminary Report (08-09-22 @ 08:13):    Testing in progress    Culture - Blood (collected 08-08-22 @ 04:01)  Source: .Blood Blood-Peripheral  Final Report (08-13-22 @ 12:00):    No Growth Final    Culture - Blood (collected 08-08-22 @ 04:01)  Source: .Blood Blood-Peripheral  Gram Stain (08-09-22 @ 15:30):    Growth in aerobic bottle: Gram Positive Cocci in Clusters  Final Report (08-11-22 @ 16:54):    Growth in aerobic bottle: Staphylococcus hominis    Growth in aerobic bottle: Staphylococcus epidermidis    Coag Negative Staphylococcus    Single set isolate, possible contaminant. Contact    Microbiology if susceptibility testing clinically    indicated.    ***Blood Panel PCR results on this specimen are available    approximately 3 hours after the Gram stain result.***    Gram stain, PCR, and/or culture results may not always    correspond due to difference in methodologies.    ************************************************************    This PCR assay was performed by multiplex PCR. This    Assay tests for 66 bacterial and resistance gene targets.    Please refer to the [facility] test directory    at [email or web address] for details.  Organism: Blood Culture PCR (08-11-22 @ 16:54)  Organism: Blood Culture PCR (08-11-22 @ 16:54)        REVIEW OF SYSTEMS  --------------------------------------------------------------------------------  unable to obtain full accurate ROS, but denies CP, SOB.    PHYSICAL EXAM:  Constitutional: awake, weak  Eyes: Anicteric  ENMT: nc/at  Respiratory: not tachypneic, non-labored on RA    Cardiovascular: normotensive, regular rate on monitor   Gastrointestinal: Soft, non distended, nontender, RLQ incisional/groin ecchymotic.   Genitourinary: anuric on HD   Extremities: SCD's in place and working bilaterally, overall with improving edema/anasarca  Vascular: Palpable dp pulses bilaterally  Neurological: AAOx2  Skin:  As noted above. No ulcerations or lesions  Musculoskeletal: Moving all extremities  Psychiatric: overall calm, but with  periods of agitation

## 2022-08-13 NOTE — PROGRESS NOTE ADULT - ASSESSMENT
66 year old male with PMH of  DM type II, HTN, CAD s/p CABG in 2020, Afib on Eliquis, CVA in 2019 due to Afib, Seizure d/o (last episode was on 4/8/22), h/o GI bleed in 2/2022 EGD with duodenal ulcer and ESRD on HD s/p R DDRT from DCD donor on 4/21/22 complicated by DGF requiring HD until 4/29/22, later had good graft function who was readmitted with status epilepticus in setting of UTI/antibiotics and sub-therapeutic valproic acid level.  Transferred to SICU on 7/25 with signs of sepsis. 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 Received pulse steroids (Solumedrol 500 on 7/30 -> 250 on 7/31).     1. 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 No  DSA.   s/p pulse dose steroids. Thymo was not given to treat the rejection as he is too frail and at increased risk for infections. Now with failed allograft function, Remains anuric and is dialysis dependent.   Pt underwent IR guided HD catheter exchange on 8/8/22 LPt with volume overload and anuric. Plan for HD with goal of 3 kgs. Monitor for labs and urine output. Bladder ray prn.      2. IS meds- was on Belatacept. now on Sirolimus ,  mg po bid and steroid taper.     3. AMS , seizures - had 3 episodes of seizures on 8/3. CT head on 8/3 was negative. On Fosphenytoin. No further episodes of seizures since 8/3. Neurology following.    4. DM -  on Lantus and lispro.     5. Cellulitis over RLQ - WBC stable. On Vancomycin . CT A/P on 8/6 revealed a retroperitoneal hematoma. Txp USG 8/6  - Patent renal artery vasculature. Stable appearance the transplant kidney .Persistent elevated resistive indices  Diffuse abdominal wall edematous changes right lower quadrant without focal collection, cultures from 8/3  negative so far. Skin bx done on 8/8/22 showed no significant findings. ID follow up.    6.  ESBL klebsiella UTI (7/15 + UCx, 7/25 +UCx)- completed a course of  Ertapenam on  8/3    7. LE edema: Pt with B/L LE/UE swelling R>L, doppler studies negative of DVT. Continue Subq Heparin. Cannot use Eliquis given interaction with Dilantin.

## 2022-08-13 NOTE — PROGRESS NOTE ADULT - ASSESSMENT
66M with h/o DM type II, HTN, CAD s/p CABG in 2020, Afib on Eliquis, CVA in 2019 due to Afib, Seizure d/o (last episode was on 4/8/22), h/o GI bleed in 2/2022 EGD with duodenal ulcer and ESRD on HD s/p R DDRT from DCD donor on 4/21/22 complicated by DGF requiring HD until 4/29/22 who presented with status epilepticus in setting of UTI/antibiotics and sub-therapeutic valproic acid level. Transferred to SICU on 7/25 with signs of sepsis, now with refractory seizures    Seizure Disorder:  - Neuro/Epilepsy Teams following. Avoid Fycompa 2/2 agitation in past  - prior MRI head 6/27 with less concerns for PRES, likely ischemic changes  - o/n CTH 8/3 with no acute findings  - replete lytes aggressively  - Continue Fosphenytoin and FU fosphenytoin level daily    R DCD DDRT 4/21/22 now with 2A ACR  - 2A ACR: s/p pulse steroids. Continue Prednisone 20mg QD  - HD/UF daily as per nephrology for fluid overload/anuria  - Scrotal support  - On vanc by level for ppx (level today 20.8).    8/3 BCx  NGTD  - graft doppler stable, stable perinephric collection last US 8/5 and CT scan 8/8  - S/P removal of ureteral stent on 7/12  - Continue Doxazosin/Proscar for BPH  - OOB with RN/PT  - DSA negative   - Renal Soft diet as tolerated with PO supplements  - 8/8 Punch biopsy wound Culture: ngtd. Negative for PTLD  - DVT ppx with SQH  - fungitel negative  - LE studies WNL  - will obtain right psoas muscle biopsy  Immunosuppression:   - Belatacept: 6/23, 7/4, 7/8. Re-loaded 7/8,  as there was a gap between doses 2/2 seizures. 7/18,  last dose 8/1  - on Sirolimus as of  8/2 per level    - Cellcept 250 BID  - continue Pred 20mg QD  PPX:  diflucan 400mg QD, bactrim, valcyte (MWF)    DM  - controlled  - Fingerstick ac andqhs  - Continue Lantus 6u qpm and premeal Humalog with SSI  - Endo following    AFib/R IJ DVT   - Eliquis with ~40% less efficacy while on fosphenytoin.    - Lovenox held for ANA and transaminitis   - rate controlled  - will discuss A/C plans daily    HTN:  - Amlodipine/Doxazosin/Hydralazline  - off Coreg due to bradycardia

## 2022-08-13 NOTE — PROGRESS NOTE ADULT - SUBJECTIVE AND OBJECTIVE BOX
[facility] DIVISION OF KIDNEY DISEASES AND HYPERTENSION -- FOLLOW UP NOTE  --------------------------------------------------------------------------------  Chief Complaint:  ANA    24 hour events/subjective:  PT feels ok.       PAST HISTORY  --------------------------------------------------------------------------------  No significant changes to PMH, PSH, FHx, SHx, unless otherwise noted    ALLERGIES & MEDICATIONS  --------------------------------------------------------------------------------  Allergies    No Known Allergies    Intolerances      Standing Inpatient Medications  amLODIPine   Tablet 10 milliGRAM(s) Oral daily  chlorhexidine 2% Cloths 1 Application(s) Topical <User Schedule>  doxazosin 4 milliGRAM(s) Oral <User Schedule>  epoetin cortney-epbx (RETACRIT) Injectable 02083 Unit(s) SubCutaneous every 7 days  finasteride 5 milliGRAM(s) Oral daily  fosphenytoin IVPB 200 milliGRAM(s) PE IV Intermittent every 12 hours  heparin   Injectable 5000 Unit(s) SubCutaneous every 8 hours  hydrALAZINE 25 milliGRAM(s) Oral every 8 hours  insulin glargine Injectable (LANTUS) 6 Unit(s) SubCutaneous at bedtime  insulin lispro (ADMELOG) corrective regimen sliding scale   SubCutaneous three times a day before meals  insulin lispro (ADMELOG) corrective regimen sliding scale   SubCutaneous at bedtime  insulin lispro Injectable (ADMELOG) 3 Unit(s) SubCutaneous three times a day before meals  levothyroxine 50 MICROGram(s) Oral daily  melatonin 6 milliGRAM(s) Oral at bedtime  mycophenolate mofetil 250 milliGRAM(s) Oral <User Schedule>  nystatin    Suspension 936658 Unit(s) Oral four times a day  pantoprazole    Tablet 40 milliGRAM(s) Oral <User Schedule>  petrolatum white Ointment 1 Application(s) Topical two times a day  polyethylene glycol 3350 17 Gram(s) Oral daily  predniSONE   Tablet 20 milliGRAM(s) Oral daily  senna 2 Tablet(s) Oral at bedtime  sirolimus 3.5 milliGRAM(s) Oral <User Schedule>  trimethoprim   80 mG/sulfamethoxazole 400 mG 1 Tablet(s) Oral daily  valGANciclovir 450 milliGRAM(s) Oral <User Schedule>    PRN Inpatient Medications  acetaminophen     Tablet .. 650 milliGRAM(s) Oral every 6 hours PRN      REVIEW OF SYSTEMS  --------------------------------------------------------------------------------  Gen: + weakness  Skin: No rashes  Head/Eyes/Ears/Mouth: No headache;No sore throat  Respiratory: No dyspnea, cough,   CV: No chest pain, PND, orthopnea  GI: No abdominal pain, diarrhea, constipation, nausea, vomiting  Transplant: mild pain  : No increased frequency, dysuria, hematuria, nocturia  MSK: weak legs   Neuro: No dizziness/lightheadedness, weakness, seizures, numbness, tingling  Psych: No significant nervousness, anxiety, stress, depression    All other systems were reviewed and are negative, except as noted.    VITALS/PHYSICAL EXAM  --------------------------------------------------------------------------------  T(C): 36.7 (08-13-22 @ 09:00), Max: 37.1 (08-12-22 @ 15:00)  HR: 69 (08-13-22 @ 09:00) (68 - 74)  BP: 158/69 (08-13-22 @ 09:00) (152/67 - 185/77)  RR: 18 (08-13-22 @ 09:00) (16 - 21)  SpO2: 98% (08-13-22 @ 09:00) (97% - 100%)  Wt(kg): --        08-12-22 @ 07:01  -  08-13-22 @ 07:00  --------------------------------------------------------  IN: 1300 mL / OUT: 3050 mL / NET: -1750 mL    08-13-22 @ 07:01  -  08-13-22 @ 13:08  --------------------------------------------------------  IN: 600 mL / OUT: 75 mL / NET: 525 mL      Physical Exam:  	Gen: NAD  	HEENT: PERRL, supple neck, clear oropharynx  	Pulm: CTA B/L  	CV: RRR, S1S2; no rub  	Back: + hip edema  	Abd: +BS, soft, nontender/nondistended                      Transplant: erythema around incision, mild tenderness.   	: No suprapubic tenderness  	LE: +1 LE edema right > left  	Neuro: No focal deficits  	Psych: Normal affect and mood  	Skin: red and indurated around incision       LABS/STUDIES  --------------------------------------------------------------------------------              7.7    14.22 >-----------<  323      [08-13-22 @ 07:03]              24.3     133  |  93  |  59  ----------------------------<  128      [08-13-22 @ 07:06]  4.2   |  24  |  3.72        Ca     8.6     [08-13-22 @ 07:06]      Mg     2.2     [08-13-22 @ 07:06]      Phos  3.3     [08-13-22 @ 07:06]    TPro  6.2  /  Alb  2.4  /  TBili  0.2  /  DBili  0.2  /  AST  15  /  ALT  11  /  AlkPhos  231  [08-13-22 @ 07:06]    PT/INR: PT 13.7 , INR 1.19       [08-13-22 @ 07:07]  PTT: 30.3       [08-13-22 @ 07:07]          [08-12-22 @ 17:17]    Creatinine Trend:  SCr 3.72 [08-13 @ 07:06]  SCr 3.54 [08-12 @ 22:07]  SCr 3.14 [08-12 @ 17:17]  SCr 2.91 [08-12 @ 05:06]  SCr 3.48 [08-11 @ 05:37]        Sirolimus (Rapamycin) Level, Serum: 4.6 ng/mL (08-12 @ 05:06)  Sirolimus (Rapamycin) Level, Serum: 3.6 ng/mL (08-11 @ 05:38)  Sirolimus (Rapamycin) Level, Serum: 3.8 ng/mL (08-10 @ 05:57)  Sirolimus (Rapamycin) Level, Serum: 3.8 ng/mL (08-09 @ 05:52)        Urinalysis - [07-25-22 @ 18:28]      Color Yellow / Appearance Slightly Turbid / SG 1.020 / pH 5.5      Gluc Negative / Ketone Negative  / Bili Negative / Urobili Negative       Blood Small / Protein 100 / Leuk Est Large / Nitrite Negative      RBC 2 / WBC 40 / Hyaline 5 / Gran  / Sq Epi  / Non Sq Epi 0 / Bacteria Many      Iron 17, TIBC 171, %sat 10      [05-02-22 @ 13:03]  Ferritin 6336      [07-27-22 @ 13:00]  PTH -- (Ca 9.2)      [02-05-22 @ 10:13]   294  HbA1c 6.0      [02-21-20 @ 08:53]  TSH 4.69      [07-06-22 @ 18:36]  Lipid: chol --, , HDL --, LDL --      [07-27-22 @ 13:00]    HBsAg Nonreact      [07-25-22 @ 11:51]  HCV 0.18, Nonreact      [07-25-22 @ 11:51]

## 2022-08-13 NOTE — PROGRESS NOTE ADULT - NUTRITIONAL ASSESSMENT

## 2022-08-13 NOTE — CHART NOTE - NSCHARTNOTEFT_GEN_A_CORE
Age: 66y    Gender: Male    POCT Blood Glucose:  280 mg/dL (08-13-22 @ 13:17)  135 mg/dL (08-13-22 @ 08:39)  189 mg/dL (08-12-22 @ 22:29)  184 mg/dL (08-12-22 @ 17:07)      eMAR:  finasteride   5 milliGRAM(s) Oral (08-13-22 @ 11:25)    insulin glargine Injectable (LANTUS)   6 Unit(s) SubCutaneous (08-12-22 @ 22:41)    insulin lispro (ADMELOG) corrective regimen sliding scale   3 Unit(s) SubCutaneous (08-13-22 @ 13:21)   1 Unit(s) SubCutaneous (08-12-22 @ 17:17)    insulin lispro Injectable (ADMELOG)   3 Unit(s) SubCutaneous (08-13-22 @ 13:21)   3 Unit(s) SubCutaneous (08-13-22 @ 08:40)   3 Unit(s) SubCutaneous (08-12-22 @ 17:17)    levothyroxine   50 MICROGram(s) Oral (08-13-22 @ 05:14)    predniSONE   Tablet   20 milliGRAM(s) Oral (08-13-22 @ 05:15)     POC glucose, insulin requirements, lab values reviewed.  noted prandial BG remains above goal, recommend increasing admelog to 4 units tid ac   c/w LDSS TID AC and qHS  c/w Lantus 6 units sq qhs as FBG at goal Age: 66y    Gender: Male    POCT Blood Glucose:  280 mg/dL (08-13-22 @ 13:17)  135 mg/dL (08-13-22 @ 08:39)  189 mg/dL (08-12-22 @ 22:29)  184 mg/dL (08-12-22 @ 17:07)      eMAR:  finasteride   5 milliGRAM(s) Oral (08-13-22 @ 11:25)    insulin glargine Injectable (LANTUS)   6 Unit(s) SubCutaneous (08-12-22 @ 22:41)    insulin lispro (ADMELOG) corrective regimen sliding scale   3 Unit(s) SubCutaneous (08-13-22 @ 13:21)   1 Unit(s) SubCutaneous (08-12-22 @ 17:17)    insulin lispro Injectable (ADMELOG)   3 Unit(s) SubCutaneous (08-13-22 @ 13:21)   3 Unit(s) SubCutaneous (08-13-22 @ 08:40)   3 Unit(s) SubCutaneous (08-12-22 @ 17:17)    levothyroxine   50 MICROGram(s) Oral (08-13-22 @ 05:14)    predniSONE   Tablet   20 milliGRAM(s) Oral (08-13-22 @ 05:15)     POC glucose, insulin requirements, lab values reviewed. cr increasing from yesterday now at 3.7   BG Goal 100-180mg/dl avoid hypoglycemia  careful titrations given rise in Cr   noted prandial BG remains above goal, recommend increasing admelog to 4 units tid ac .   if BG <130 lower admelog to 2 units x1 and notify endocrine   c/w LDSS TID AC and qHS  c/w Lantus 6 units sq qhs as FBG at goal    d/w transplant pa

## 2022-08-13 NOTE — PROGRESS NOTE ADULT - SUBJECTIVE AND OBJECTIVE BOX
[facility] [place] [facility]   MD NINA MASON PA MIJUNG SHIN NP     TEL:  OFFICE: [phone]    From 5pm-7am Answering Service [phone]    -- RENAL FOLLOW UP NOTE ---Date of Service 08-13-22 @ 18:55    Patient is a 66y old  Male who presents with a chief complaint of Status Epilepticus (13 Aug 2022 15:37)      Patient seen and examined at bedside. No chest pain/sob    VITALS:  T(F): 98 (08-13-22 @ 17:08), Max: 98.6 (08-12-22 @ 19:00)  HR: 671 (08-13-22 @ 17:08)  BP: 162/67 (08-13-22 @ 17:08)  RR: 18 (08-13-22 @ 17:08)  SpO2: 99% (08-13-22 @ 17:08)  Wt(kg): --    08-12 @ 07:01  -  08-13 @ 07:00  --------------------------------------------------------  IN: 1300 mL / OUT: 3050 mL / NET: -1750 mL    08-13 @ 07:01  -  08-13 @ 18:55  --------------------------------------------------------  IN: 1280 mL / OUT: 3575 mL / NET: -2295 mL          PHYSICAL EXAM:  Constitutional: NAD  Neck: No JVD  Respiratory: CTAB, no wheezes, rales or rhonchi  Cardiovascular: S1, S2, RRR  Gastrointestinal: BS+, soft, NT/ND  Extremities: b/l feet edema 1+    Hospital Medications:   MEDICATIONS  (STANDING):  amLODIPine   Tablet 10 milliGRAM(s) Oral daily  chlorhexidine 2% Cloths 1 Application(s) Topical <User Schedule>  doxazosin 4 milliGRAM(s) Oral <User Schedule>  epoetin cortney-epbx (RETACRIT) Injectable 60030 Unit(s) SubCutaneous every 7 days  finasteride 5 milliGRAM(s) Oral daily  fosphenytoin IVPB 200 milliGRAM(s) PE IV Intermittent every 12 hours  heparin   Injectable 5000 Unit(s) SubCutaneous every 8 hours  hydrALAZINE 25 milliGRAM(s) Oral every 8 hours  insulin glargine Injectable (LANTUS) 6 Unit(s) SubCutaneous at bedtime  insulin lispro (ADMELOG) corrective regimen sliding scale   SubCutaneous three times a day before meals  insulin lispro (ADMELOG) corrective regimen sliding scale   SubCutaneous at bedtime  insulin lispro Injectable (ADMELOG) 4 Unit(s) SubCutaneous three times a day before meals  levothyroxine 50 MICROGram(s) Oral daily  melatonin 6 milliGRAM(s) Oral at bedtime  mycophenolate mofetil 250 milliGRAM(s) Oral <User Schedule>  nystatin    Suspension 582509 Unit(s) Oral four times a day  pantoprazole    Tablet 40 milliGRAM(s) Oral <User Schedule>  petrolatum white Ointment 1 Application(s) Topical two times a day  polyethylene glycol 3350 17 Gram(s) Oral daily  predniSONE   Tablet 20 milliGRAM(s) Oral daily  senna 2 Tablet(s) Oral at bedtime  sirolimus 3.5 milliGRAM(s) Oral <User Schedule>  trimethoprim   80 mG/sulfamethoxazole 400 mG 1 Tablet(s) Oral daily  valGANciclovir 450 milliGRAM(s) Oral <User Schedule>      LABS:  08-13    133<L>  |  93<L>  |  59<H>  ----------------------------<  128<H>  4.2   |  24  |  3.72<H>    Ca    8.6      13 Aug 2022 07:06  Phos  3.3     08-13  Mg     2.2     08-13    TPro  6.2  /  Alb  2.4<L>  /  TBili  0.2  /  DBili  0.2  /  AST  15  /  ALT  11  /  AlkPhos  231<H>  08-13    Creatinine Trend: 3.72 <--, 3.54 <--, 3.14 <--, 2.91 <--, 3.48 <--, 2.71 <--, 4.25 <--, 3.70 <--, 3.43 <--, 3.23 <--, 4.70 <--, 4.58 <--, 3.98 <--, 3.68 <--    Albumin, Serum: 2.4 g/dL (08-13 @ 07:06)  Phosphorus Level, Serum: 3.3 mg/dL (08-13 @ 07:06)  Albumin, Serum: 2.2 g/dL (08-12 @ 22:07)  Phosphorus Level, Serum: 3.4 mg/dL (08-12 @ 22:07)                              7.7    14.22 )-----------( 323      ( 13 Aug 2022 07:03 )             24.3     Urine Studies:  Urinalysis - [07-25-22 @ 18:28]      Color Yellow / Appearance Slightly Turbid / SG 1.020 / pH 5.5      Gluc Negative / Ketone Negative  / Bili Negative / Urobili Negative       Blood Small / Protein 100 / Leuk Est Large / Nitrite Negative      RBC 2 / WBC 40 / Hyaline 5 / Gran  / Sq Epi  / Non Sq Epi 0 / Bacteria Many      Iron 17, TIBC 171, %sat 10      [05-02-22 @ 13:03]  Ferritin 6336      [07-27-22 @ 13:00]  PTH -- (Ca 9.2)      [02-05-22 @ 10:13]   294  HbA1c 6.0      [02-21-20 @ 08:53]  TSH 4.69      [07-06-22 @ 18:36]  Lipid: chol --, , HDL --, LDL --      [07-27-22 @ 13:00]    HBsAg Nonreact      [07-25-22 @ 11:51]  HCV 0.18, Nonreact      [07-25-22 @ 11:51]      RADIOLOGY & ADDITIONAL STUDIES:

## 2022-08-14 NOTE — PROGRESS NOTE ADULT - NUTRITIONAL ASSESSMENT

## 2022-08-14 NOTE — PROGRESS NOTE ADULT - ASSESSMENT
66 yr old Male with PMHx ESRD s/p permacath removal 5/10/22 s/p Rt DDRT 4/21/22,  CVA (2019), Afib on apixaban, seizure activity, CAD s/p stents, CABG (2020), HTN, HLD, DM on insulin, gastric/duodenal ulcer, recent hospitalization 4/28/22 -5/10/22 with weakness/anemia found to have perinephric hematoma requiring evacuation and repair of bleeding arterial anastomosis. Curently being treated for UTI with Levaquin Today after developing multiple sz episodes refractory to 5 mg versed IM (EMS) and 2 mg ativan IV in E.D. concerning for status epilepticus requiring intubation for hypoxic respiratory  failure. MICU Consult was called for hypoxic respiratory failure secondary to status epilepticus.  Nephrology consulted for renal failure.     A/P  DDRT on 04/21/22  Course complicated by DGF, was on HD until 4/29/22. Readmitted in May for anemia in the setting of large perinephric hematoma s/p evacuation and repair of arterial anastomosis on 5/02/22. Intra operative biopsy showed no rejection.  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   Now with failed allograft function, Remains anuric and is dialysis dependent.   continue Immunosuppression per transplant team  s/p HD with UF 8/12 - RRT per transplant team    transplant team on board   monitor BMP, U/O     HTN   acceptable  BP manage by transplant team    monitor BP closely

## 2022-08-14 NOTE — PROGRESS NOTE ADULT - ASSESSMENT
66M with h/o DM type II, HTN, CAD s/p CABG in 2020, Afib on Eliquis, CVA in 2019 due to Afib, Seizure d/o (last episode was on 4/8/22), h/o GI bleed in 2/2022 EGD with duodenal ulcer and ESRD on HD s/p R DDRT from DCD donor on 4/21/22 complicated by DGF requiring HD until 4/29/22 who presented with status epilepticus in setting of UTI/antibiotics and sub-therapeutic valproic acid level. Transferred to SICU on 7/25 with signs of sepsis, now with refractory seizures    Seizure Disorder:  - Neuro/Epilepsy Teams following. Avoid Fycompa 2/2 agitation in past  - prior MRI head 6/27 with less concerns for PRES, likely ischemic changes  - o/n CTH 8/3 with no acute findings  - replete lytes aggressively  - Continue Fosphenytoin and FU fosphenytoin level daily    R DCD DDRT 4/21/22 now with 2A ACR  - 2A ACR: s/p pulse steroids. Continue Prednisone 20mg QD  - HD/UF daily as per nephrology for fluid overload/anuria  - Scrotal support  - On vanc by level for ppx (level today 20.8).    8/3 BCx  NGTD  - graft doppler stable, stable perinephric collection last US 8/5 and CT scan 8/8  - S/P removal of ureteral stent on 7/12  - Continue Doxazosin/Proscar for BPH  - OOB with RN/PT  - DSA negative   - Renal Soft diet as tolerated with PO supplements  - 8/8 Punch biopsy wound Culture: ngtd. Negative for PTLD  - DVT ppx with SQH  - fungitel negative  - LE studies WNL  - will obtain right psoas muscle biopsy  Immunosuppression:   - Belatacept: 6/23, 7/4, 7/8. Re-loaded 7/8,  as there was a gap between doses 2/2 seizures. 7/18,  last dose 8/1  - on Sirolimus as of  8/2 per level    - Cellcept 250 BID  - continue Pred 20mg QD  PPX:  diflucan 400mg QD, bactrim, valcyte (MWF)    DM  - controlled  - Fingerstick ac andqhs  - Continue Lantus 6u qpm and premeal Humalog with SSI  - Endo following    AFib/R IJ DVT   - Eliquis with ~40% less efficacy while on fosphenytoin.    - Lovenox held for ANA and transaminitis   - rate controlled  - will discuss A/C plans daily    HTN:  - Amlodipine/Doxazosin/Hydralazline  - off Coreg due to bradycardia   66M with h/o DM type II, HTN, CAD s/p CABG in 2020, Afib on Eliquis, CVA in 2019 due to Afib, Seizure d/o (last episode was on 4/8/22), h/o GI bleed in 2/2022 EGD with duodenal ulcer and ESRD on HD s/p R DDRT from DCD donor on 4/21/22 complicated by DGF requiring HD until 4/29/22 who presented with status epilepticus in setting of UTI/antibiotics and sub-therapeutic valproic acid level. Transferred to SICU on 7/25 with signs of sepsis, now with refractory seizures    Seizure Disorder:  - Neuro/Epilepsy Teams following. Avoid Fycompa 2/2 agitation in past  - prior MRI head 6/27 with less concerns for PRES, likely ischemic changes  - o/n CTH 8/3 with no acute findings  - replete lytes aggressively  - Continue Fosphenytoin and FU fosphenytoin level daily    R DCD DDRT 4/21/22 c/b 2A ACR  - 2A ACR: s/p pulse steroids. Continue Prednisone 20mg QD  - HD/UF daily as per nephrology for fluid overload/anuria  - Scrotal support  - On vanc by level  - Send surveillance BCx today  - graft doppler stable, stable perinephric collection last US 8/5 and CT scan 8/8  - S/P removal of ureteral stent on 7/12  - Continue Doxazosin/Proscar for BPH  - OOB with RN/PT  - DSA negative   - Renal Soft diet as tolerated with PO supplements  - 8/8 Punch biopsy wound Culture: ngtd. Negative for PTLD  - DVT ppx with SQH  - fungitel negative  - LE studies WNL  - Benadryl PRN for sleep    Immunosuppression:   - Belatacept: 6/23-8/1 discontinued  - on Sirolimus as of  8/2 daily based on level, Cellcept 250 BID, Pred 20mg QD  - PPX:  Nystatin, bactrim, valcyte (MWF)    DM  - controlled  - Fingerstick ac and qhs  - Continue Lantus 6u qpm and premeal Humalog with SSI  - Endo following    AFib/R IJ DVT   - Eliquis with ~40% less efficacy while on fosphenytoin.    - Lovenox held for ANA and transaminitis   - rate controlled  - will discuss A/C plans daily    HTN:  - Amlodipine/Doxazosin/Hydralazline  - off Coreg due to bradycardia

## 2022-08-14 NOTE — PROGRESS NOTE ADULT - ASSESSMENT
66 year old male with PMH of  DM type II, HTN, CAD s/p CABG in 2020, Afib on Eliquis, CVA in 2019 due to Afib, Seizure d/o (last episode was on 4/8/22), h/o GI bleed in 2/2022 EGD with duodenal ulcer and ESRD on HD s/p R DDRT from DCD donor on 4/21/22 complicated by DGF requiring HD until 4/29/22, later had good graft function who was readmitted with status epilepticus in setting of UTI/antibiotics and sub-therapeutic valproic acid level.  Transferred to SICU on 7/25 with signs of sepsis. 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 Received pulse steroids (Solumedrol 500 on 7/30 -> 250 on 7/31).     1. 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 No  DSA.   s/p pulse dose steroids. Thymo was not given to treat the rejection as he is too frail and at increased risk for infections. Now with failed allograft function, Remains anuric and is dialysis dependent.   Pt underwent IR guided HD catheter exchange on 8/8/22.  Has been receiving dialysis daily.  Hold for today - volume status has greatly improved.  Plan UF tomorrow.     2. IS meds- was on Belatacept. now on Sirolimus ,  mg po bid and steroid taper.     3. AMS , seizures - had 3 episodes of seizures on 8/3. CT head on 8/3 was negative. On Fosphenytoin. No further episodes of seizures since 8/3. Neurology following.    4. DM -  on Lantus and lispro.     5. Cellulitis over RLQ - WBC stable. On Vancomycin . CT A/P on 8/6 revealed a retroperitoneal hematoma. Txp USG 8/6  - Patent renal artery vasculature. Stable appearance the transplant kidney .Persistent elevated resistive indices  Diffuse abdominal wall edematous changes right lower quadrant without focal collection, cultures from 8/3  negative so far. Skin bx done on 8/8/22 showed no significant findings. ID follow up.  Improving.      6.  ESBL klebsiella UTI (7/15 + UCx, 7/25 +UCx)- completed a course of  Ertapenam on  8/3    7. LE edema: Pt with B/L LE/UE swelling R>L, doppler studies negative of DVT. Continue Subq Heparin. Cannot use Eliquis given interaction with Dilantin.  Edema greatly improving with fluid removal.

## 2022-08-14 NOTE — PROGRESS NOTE ADULT - SUBJECTIVE AND OBJECTIVE BOX
Transplant Surgery - Multidisciplinary Progress Note  --------------------------------------------------------------  DDRT     4/21/2022             Present:  Patient seen with multidisciplinary team including Transplant Surgeon: Dr. Tena,  Nephrologist: Dr. NORBERTO Lomeli, NP Jose. Disciplines not in attendance will be notified of the plan.      66M with PMH: DM type II, HTN, CAD s/p CABG in 2020, AFib on Eliquis, CVA in 2019 due to Afib, Seizure d/o following CVA, last episode was on 4/8/22, h/o GIB in 2/2022 EGD with duodenal ulcer.  ESRD due to DM was on HD since 2019.     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  Donor was 58 , KDPI 82%, DCD, single vessels and ureter, HLA mismatch 1, 2, 2. No DSA, cPRA 0%. 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 Re-admitted in May for anemia in the setting of large perinephric hematoma s/p evacuation and repair of arterial anastomosis on 5/2/22. Intra operative biopsy showed no rejection, Creatinine ranging ~2mg/dL.    In Rehab:  He was recently dx with klebsiella UTI with Ertapenem - then switched to Levaquin    He also had parainfluenza pneumonia. Was at rehab progressing well.      Hospital course:  - 6/10: transferred from rehab facility for seizure status epilepticus. Intubated for respiratory protection and transferred to MICU.  EEG showed no seizure activity. Neuro consulted.   - 6/11: Extubated. Found to have R pleural effusion. s/p Thoracentesis 1.3L. Eliquis changed to heparin drip.  Post procedure drop in H&H. 5u PRBC, 2 u FFP.   - 6/11 evening: Transferred to SICU from MICU for further care in setting of hypoxia, significant R pleural effusion, anemia and hemodynamic instability  - 6/11 Intubated at 9pm and sedated on Precedex.  CT placed, 600ml blood drained.  1L total overnight, started pressors  - 6/11 Received Protamine for PTT >100 (was on Heparin drip started for AF), 2 u FFP and 5u PRBC total  - 6/13 s/p VATS 2.2L old blood removed; second chest tube placed  - 6/18-19: chest tubes removed  - 6/21: ?PRES vs. chronic ischemic changes on MRI, off Envarsus, switched to Belatacept 6/23 with good graft function  - 6/25: Tx to SICU for refractory seizures, improved with IV antiepileptic regimen  - 7/10: Downgraded from SICU to floor.   - 7/11: OR-->URETERAL STENT REMOVED  - 7/15: ESBL Kleb UTI (50-90K), completed Ertapenem x 3 Days  - 7/25: Tx to SICU for Sepsis/ elevated LFTS  - 7/27: Shiley placed for HD  - 7/28: ongoing fevers -> started Ertapenem/Fluc  - 7/29: HD restarted + 1U PRBC.  IR bx with 2A rejection, started pulse steroids. LFTs have improved  - 8/4: refractory seizures    Interval events:   - HD yesterday -3L.  Peripheral edema improving  - R sided erythema around wound w/ scrotal and penile edema relatively stable. Reports minimal discomfort  - WBC downtrending 12 from 14      Immunosuppression  Induction:  Thymoglobulin                                        Maintenance:  - Belatacept: 6/23, 7/4, 7/8, 7/18. 8/1, now discontinued  - On Sirolimus as of 8/2  - Cellcept to 250 BID  - Pred 20mg po qd   - Ongoing monitoring for signs of rejection.    Potential Discharge date: pending clinical improvement.     Education:  Medications    Plan of care:  See Below      MEDICATIONS  (STANDING):  amLODIPine   Tablet 10 milliGRAM(s) Oral daily  chlorhexidine 2% Cloths 1 Application(s) Topical <User Schedule>  doxazosin 4 milliGRAM(s) Oral <User Schedule>  epoetin cortney-epbx (RETACRIT) Injectable 53561 Unit(s) SubCutaneous every 7 days  finasteride 5 milliGRAM(s) Oral daily  fosphenytoin IVPB 200 milliGRAM(s) PE IV Intermittent every 12 hours  heparin   Injectable 5000 Unit(s) SubCutaneous every 8 hours  hydrALAZINE 25 milliGRAM(s) Oral every 8 hours  insulin glargine Injectable (LANTUS) 6 Unit(s) SubCutaneous at bedtime  insulin lispro (ADMELOG) corrective regimen sliding scale   SubCutaneous three times a day before meals  insulin lispro (ADMELOG) corrective regimen sliding scale   SubCutaneous at bedtime  insulin lispro Injectable (ADMELOG) 3 Unit(s) SubCutaneous three times a day before meals  levothyroxine 50 MICROGram(s) Oral daily  melatonin 6 milliGRAM(s) Oral at bedtime  mycophenolate mofetil 250 milliGRAM(s) Oral <User Schedule>  nystatin    Suspension 719764 Unit(s) Oral four times a day  pantoprazole    Tablet 40 milliGRAM(s) Oral <User Schedule>  petrolatum white Ointment 1 Application(s) Topical two times a day  polyethylene glycol 3350 17 Gram(s) Oral daily  predniSONE   Tablet 20 milliGRAM(s) Oral daily  senna 2 Tablet(s) Oral at bedtime  sirolimus 3.5 milliGRAM(s) Oral <User Schedule>  trimethoprim   80 mG/sulfamethoxazole 400 mG 1 Tablet(s) Oral daily  valGANciclovir 450 milliGRAM(s) Oral <User Schedule>    MEDICATIONS  (PRN):  acetaminophen     Tablet .. 650 milliGRAM(s) Oral every 6 hours PRN Mild Pain (1 - 3)      PAST MEDICAL & SURGICAL HISTORY:  Hypertension      Diabetes      Dyslipidemia      CAD (Coronary Artery Disease)  with Stents in 06/2009 and 6/2019, s/p off-pump C3L on 8/13/20      Hypothyroidism      CVA (cerebral vascular accident)  12/13/19 with residual bilateral weakness      Anemia      PEG (percutaneous endoscopic gastrostomy) status  removed July 2020      Intubation of airway performed without difficulty  Dec 2019  CVA c/b status epilepticus requiring intubation and PEG in 12/2019-      ESRD on dialysis  M/W/F      Seizures  after CVA, last seizure was last week 4/07/22      History of insertion of stent into coronary artery bypass graft  2009 and 2019      S/P CABG x 3  off pump C3L on 8/13/20          Vital Signs Last 24 Hrs  T(C): 36.8 (13 Aug 2022 13:00), Max: 37.1 (12 Aug 2022 17:47)  T(F): 98.2 (13 Aug 2022 13:00), Max: 98.8 (12 Aug 2022 17:47)  HR: 67 (13 Aug 2022 13:54) (65 - 72)  BP: 159/70 (13 Aug 2022 13:54) (152/67 - 185/77)  BP(mean): 102 (12 Aug 2022 21:00) (97 - 109)  RR: 24 (13 Aug 2022 13:00) (17 - 24)  SpO2: 98% (13 Aug 2022 13:00) (97% - 100%)    Parameters below as of 13 Aug 2022 13:00  Patient On (Oxygen Delivery Method): room air        I&O's Summary    12 Aug 2022 07:01  -  13 Aug 2022 07:00  --------------------------------------------------------  IN: 1300 mL / OUT: 3050 mL / NET: -1750 mL    13 Aug 2022 07:01  -  13 Aug 2022 15:47  --------------------------------------------------------  IN: 600 mL / OUT: 75 mL / NET: 525 mL                              7.7    14.22 )-----------( 323      ( 13 Aug 2022 07:03 )             24.3     08-13    133<L>  |  93<L>  |  59<H>  ----------------------------<  128<H>  4.2   |  24  |  3.72<H>    Ca    8.6      13 Aug 2022 07:06  Phos  3.3     08-13  Mg     2.2     08-13    TPro  6.2  /  Alb  2.4<L>  /  TBili  0.2  /  DBili  0.2  /  AST  15  /  ALT  11  /  AlkPhos  231<H>  08-13          Culture - Blood (collected 08-09-22 @ 18:32)  Source: .Blood Blood-Peripheral  Preliminary Report (08-11-22 @ 02:01):    No growth to date.    Culture - Blood (collected 08-09-22 @ 18:32)  Source: .Blood Blood-Peripheral  Preliminary Report (08-11-22 @ 02:01):    No growth to date.    Culture - Tissue with Gram Stain (collected 08-08-22 @ 17:44)  Source: .Tissue Other  Gram Stain (08-09-22 @ 02:45):    No polymorphonuclear cells seen seen per low power field    No organisms seen per oil power field  Preliminary Report (08-09-22 @ 20:16):    No growth    Culture - Acid Fast - Tissue w/Smear (collected 08-08-22 @ 17:44)  Source: .Tissue Other  Preliminary Report (08-10-22 @ 15:04):    Culture is being performed.    Culture - Fungal, Tissue (collected 08-08-22 @ 17:44)  Source: .Tissue Other  Preliminary Report (08-09-22 @ 08:13):    Testing in progress    Culture - Blood (collected 08-08-22 @ 04:01)  Source: .Blood Blood-Peripheral  Final Report (08-13-22 @ 12:00):    No Growth Final    Culture - Blood (collected 08-08-22 @ 04:01)  Source: .Blood Blood-Peripheral  Gram Stain (08-09-22 @ 15:30):    Growth in aerobic bottle: Gram Positive Cocci in Clusters  Final Report (08-11-22 @ 16:54):    Growth in aerobic bottle: Staphylococcus hominis    Growth in aerobic bottle: Staphylococcus epidermidis    Coag Negative Staphylococcus    Single set isolate, possible contaminant. Contact    Microbiology if susceptibility testing clinically    indicated.    ***Blood Panel PCR results on this specimen are available    approximately 3 hours after the Gram stain result.***    Gram stain, PCR, and/or culture results may not always    correspond due to difference in methodologies.    ************************************************************    This PCR assay was performed by multiplex PCR. This    Assay tests for 66 bacterial and resistance gene targets.    Please refer to the [facility] test directory    at [email or web address] for details.  Organism: Blood Culture PCR (08-11-22 @ 16:54)  Organism: Blood Culture PCR (08-11-22 @ 16:54)        REVIEW OF SYSTEMS  --------------------------------------------------------------------------------  unable to obtain full accurate ROS, but denies CP, SOB.    PHYSICAL EXAM:  Constitutional: awake, weak  Eyes: Anicteric  ENMT: nc/at  Respiratory: not tachypneic, non-labored on RA    Cardiovascular: normotensive, regular rate on monitor   Gastrointestinal: Soft, non distended, nontender, RLQ incisional/groin ecchymotic.   Genitourinary: anuric on HD   Extremities: SCD's in place and working bilaterally, overall with improving edema/anasarca  Vascular: Palpable dp pulses bilaterally  Neurological: AAOx2  Skin:  As noted above. No ulcerations or lesions  Musculoskeletal: Moving all extremities  Psychiatric: overall calm, but with  periods of agitation     Transplant Surgery - Multidisciplinary Progress Note  --------------------------------------------------------------  DDRT     4/21/2022             Present:  Patient seen with multidisciplinary team including Transplant Surgeon: Dr. Tena,  Nephrologist: Dr. NORBERTO Lomeli, NP Jose. Disciplines not in attendance will be notified of the plan.      66M with PMH: DM type II, HTN, CAD s/p CABG in 2020, AFib on Eliquis, CVA in 2019 due to Afib, Seizure d/o following CVA, last episode was on 4/8/22, h/o GIB in 2/2022 EGD with duodenal ulcer.  ESRD due to DM was on HD since 2019.     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  Donor was 58 , KDPI 82%, DCD, single vessels and ureter, HLA mismatch 1, 2, 2. No DSA, cPRA 0%. 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 Re-admitted in May for anemia in the setting of large perinephric hematoma s/p evacuation and repair of arterial anastomosis on 5/2/22. Intra operative biopsy showed no rejection, Creatinine ranging ~2mg/dL.    In Rehab:  He was recently dx with klebsiella UTI with Ertapenem - then switched to Levaquin    He also had parainfluenza pneumonia. Was at rehab progressing well.      Hospital course:  - 6/10: transferred from rehab facility for seizure status epilepticus. Intubated for respiratory protection and transferred to MICU.  EEG showed no seizure activity. Neuro consulted.   - 6/11: Extubated. Found to have R pleural effusion. s/p Thoracentesis 1.3L. Eliquis changed to heparin drip.  Post procedure drop in H&H. 5u PRBC, 2 u FFP.   - 6/11 evening: Transferred to SICU from MICU for further care in setting of hypoxia, significant R pleural effusion, anemia and hemodynamic instability  - 6/11 Intubated at 9pm and sedated on Precedex.  CT placed, 600ml blood drained.  1L total overnight, started pressors  - 6/11 Received Protamine for PTT >100 (was on Heparin drip started for AF), 2 u FFP and 5u PRBC total  - 6/13 s/p VATS 2.2L old blood removed; second chest tube placed  - 6/18-19: chest tubes removed  - 6/21: ?PRES vs. chronic ischemic changes on MRI, off Envarsus, switched to Belatacept 6/23 with good graft function  - 6/25: Tx to SICU for refractory seizures, improved with IV antiepileptic regimen  - 7/10: Downgraded from SICU to floor.   - 7/11: OR-->URETERAL STENT REMOVED  - 7/15: ESBL Kleb UTI (50-90K), completed Ertapenem x 3 Days  - 7/25: Tx to SICU for Sepsis/ elevated LFTS  - 7/27: Shiley placed for HD  - 7/28: ongoing fevers -> started Ertapenem/Fluc  - 7/29: HD restarted + 1U PRBC.  IR bx with 2A rejection, started pulse steroids. LFTs have improved  - 8/4: refractory seizures    Interval events:   - HD yesterday -3L.  Peripheral edema improving  - R sided erythema around wound w/ scrotal and penile edema relatively stable. Reports minimal discomfort  - WBC downtrending 12 from 14      Immunosuppression  Induction:  Thymoglobulin                                        Maintenance: Sirolimus as of 8/2,  Cellcept to 250 BID, Pred 20mg po qd   Ongoing monitoring for signs of rejection.    Potential Discharge date: pending clinical improvement.     Education:  Medications    Plan of care:  See Below      MEDICATIONS  (STANDING):  amLODIPine   Tablet 10 milliGRAM(s) Oral daily  chlorhexidine 2% Cloths 1 Application(s) Topical <User Schedule>  doxazosin 4 milliGRAM(s) Oral <User Schedule>  epoetin cortney-epbx (RETACRIT) Injectable 11857 Unit(s) SubCutaneous every 7 days  finasteride 5 milliGRAM(s) Oral daily  fosphenytoin IVPB 200 milliGRAM(s) PE IV Intermittent every 12 hours  heparin   Injectable 5000 Unit(s) SubCutaneous every 8 hours  hydrALAZINE 25 milliGRAM(s) Oral every 8 hours  insulin glargine Injectable (LANTUS) 6 Unit(s) SubCutaneous at bedtime  insulin lispro (ADMELOG) corrective regimen sliding scale   SubCutaneous three times a day before meals  insulin lispro (ADMELOG) corrective regimen sliding scale   SubCutaneous at bedtime  insulin lispro Injectable (ADMELOG) 5 Unit(s) SubCutaneous three times a day before meals  levothyroxine 50 MICROGram(s) Oral daily  melatonin 6 milliGRAM(s) Oral at bedtime  mycophenolate mofetil 250 milliGRAM(s) Oral <User Schedule>  nystatin    Suspension 002850 Unit(s) Oral four times a day  pantoprazole    Tablet 40 milliGRAM(s) Oral <User Schedule>  petrolatum white Ointment 1 Application(s) Topical two times a day  polyethylene glycol 3350 17 Gram(s) Oral daily  predniSONE   Tablet 20 milliGRAM(s) Oral daily  senna 2 Tablet(s) Oral at bedtime  sirolimus 3.5 milliGRAM(s) Oral <User Schedule>  trimethoprim   80 mG/sulfamethoxazole 400 mG 1 Tablet(s) Oral daily  valGANciclovir 450 milliGRAM(s) Oral <User Schedule>    MEDICATIONS  (PRN):  acetaminophen     Tablet .. 650 milliGRAM(s) Oral every 6 hours PRN Mild Pain (1 - 3)  diphenhydrAMINE 25 milliGRAM(s) Oral at bedtime PRN Insomnia      PAST MEDICAL & SURGICAL HISTORY:  Hypertension      Diabetes      Dyslipidemia      CAD (Coronary Artery Disease)  with Stents in 06/2009 and 6/2019, s/p off-pump C3L on 8/13/20      Hypothyroidism      CVA (cerebral vascular accident)  12/13/19 with residual bilateral weakness      Anemia      PEG (percutaneous endoscopic gastrostomy) status  removed July 2020      Intubation of airway performed without difficulty  Dec 2019  CVA c/b status epilepticus requiring intubation and PEG in 12/2019-      ESRD on dialysis  M/W/F      Seizures  after CVA, last seizure was last week 4/07/22      History of insertion of stent into coronary artery bypass graft  2009 and 2019      S/P CABG x 3  off pump C3L on 8/13/20          Vital Signs Last 24 Hrs  T(C): 36.5 (14 Aug 2022 13:00), Max: 37.7 (13 Aug 2022 21:52)  T(F): 97.7 (14 Aug 2022 13:00), Max: 99.9 (13 Aug 2022 21:52)  HR: 67 (14 Aug 2022 13:00) (67 - 89)  BP: 137/62 (14 Aug 2022 13:00) (137/62 - 184/69)  BP(mean): --  RR: 18 (14 Aug 2022 13:00) (18 - 18)  SpO2: 98% (14 Aug 2022 13:00) (95% - 100%)    Parameters below as of 14 Aug 2022 13:00  Patient On (Oxygen Delivery Method): room air        I&O's Summary    13 Aug 2022 07:01  -  14 Aug 2022 07:00  --------------------------------------------------------  IN: 1650 mL / OUT: 3575 mL / NET: -1925 mL    14 Aug 2022 07:01  -  14 Aug 2022 15:16  --------------------------------------------------------  IN: 480 mL / OUT: 40 mL / NET: 440 mL                              7.6    12.79 )-----------( 336      ( 14 Aug 2022 06:59 )             24.8     08-14    133<L>  |  93<L>  |  49<H>  ----------------------------<  205<H>  3.9   |  25  |  3.17<H>    Ca    8.4      14 Aug 2022 07:00  Phos  2.6     08-14  Mg     2.0     08-14    TPro  6.2  /  Alb  2.3<L>  /  TBili  0.2  /  DBili  0.1  /  AST  17  /  ALT  14  /  AlkPhos  237<H>  08-14          Culture - Blood (collected 08-09-22 @ 18:32)  Source: .Blood Blood-Peripheral  Preliminary Report (08-11-22 @ 02:01):    No growth to date.    Culture - Blood (collected 08-09-22 @ 18:32)  Source: .Blood Blood-Peripheral  Preliminary Report (08-11-22 @ 02:01):    No growth to date.    Culture - Tissue with Gram Stain (collected 08-08-22 @ 17:44)  Source: .Tissue Other  Gram Stain (08-09-22 @ 02:45):    No polymorphonuclear cells seen seen per low power field    No organisms seen per oil power field  Final Report (08-13-22 @ 18:18):    No growth at 5 days    Culture - Acid Fast - Tissue w/Smear (collected 08-08-22 @ 17:44)  Source: .Tissue Other  Preliminary Report (08-10-22 @ 15:04):    Culture is being performed.    Culture - Fungal, Tissue (collected 08-08-22 @ 17:44)  Source: .Tissue Other  Preliminary Report (08-09-22 @ 08:13):    Testing in progress    Culture - Blood (collected 08-08-22 @ 04:01)  Source: .Blood Blood-Peripheral  Final Report (08-13-22 @ 12:00):    No Growth Final    Culture - Blood (collected 08-08-22 @ 04:01)  Source: .Blood Blood-Peripheral  Gram Stain (08-09-22 @ 15:30):    Growth in aerobic bottle: Gram Positive Cocci in Clusters  Final Report (08-11-22 @ 16:54):    Growth in aerobic bottle: Staphylococcus hominis    Growth in aerobic bottle: Staphylococcus epidermidis    Coag Negative Staphylococcus    Single set isolate, possible contaminant. Contact    Microbiology if susceptibility testing clinically    indicated.    ***Blood Panel PCR results on this specimen are available    approximately 3 hours after the Gram stain result.***    Gram stain, PCR, and/or culture results may not always    correspond due to difference in methodologies.    ************************************************************    This PCR assay was performed by multiplex PCR. This    Assay tests for 66 bacterial and resistance gene targets.    Please refer to the [facility] test directory    at [email or web address] for details.  Organism: Blood Culture PCR (08-11-22 @ 16:54)  Organism: Blood Culture PCR (08-11-22 @ 16:54)      REVIEW OF SYSTEMS  --------------------------------------------------------------------------------  unable to obtain full accurate ROS, but denies CP, SOB.    PHYSICAL EXAM:  Constitutional: awake, weak  Eyes: Anicteric  ENMT: nc/at  Respiratory: not tachypneic, non-labored on RA    Cardiovascular: normotensive, regular rate on monitor   Gastrointestinal: Soft, non distended, nontender, RLQ incisional/groin ecchymotic.   Genitourinary: anuric on HD   Extremities: SCD's in place and working bilaterally, overall with improving edema/anasarca  Vascular: Palpable dp pulses bilaterally  Neurological: AAOx2  Skin:  As noted above. No ulcerations or lesions  Musculoskeletal: Moving all extremities  Psychiatric: overall calm, following directions

## 2022-08-14 NOTE — CHART NOTE - NSCHARTNOTEFT_GEN_A_CORE
Age: 66y    Gender: Male    POCT Blood Glucose:  220 mg/dL (08-14-22 @ 12:26)  506 mg/dL (08-14-22 @ 12:23)  179 mg/dL (08-14-22 @ 08:36)  154 mg/dL (08-13-22 @ 21:52)  231 mg/dL (08-13-22 @ 17:23)      eMAR:  finasteride   5 milliGRAM(s) Oral (08-14-22 @ 13:23)    insulin glargine Injectable (LANTUS)   6 Unit(s) SubCutaneous (08-13-22 @ 22:20)    insulin lispro (ADMELOG) corrective regimen sliding scale   2 Unit(s) SubCutaneous (08-14-22 @ 13:18)   1 Unit(s) SubCutaneous (08-14-22 @ 08:55)   2 Unit(s) SubCutaneous (08-13-22 @ 17:26)    insulin lispro Injectable (ADMELOG)   4 Unit(s) SubCutaneous (08-14-22 @ 13:19)   4 Unit(s) SubCutaneous (08-14-22 @ 08:56)   4 Unit(s) SubCutaneous (08-13-22 @ 17:27)    levothyroxine   50 MICROGram(s) Oral (08-14-22 @ 05:21)    predniSONE   Tablet   20 milliGRAM(s) Oral (08-14-22 @ 05:21)       POC glucose, insulin requirements, lab values reviewed.   prandial bg remains mostly above goal, increase admelog to 5 units with meals  (requiring 5-6 units with meals presently)  c/w lantus 6 units sq qhs  c/wLDSS tid ac qhs Age: 66y    Gender: Male    POCT Blood Glucose:  220 mg/dL (08-14-22 @ 12:26)  506 mg/dL (08-14-22 @ 12:23)  179 mg/dL (08-14-22 @ 08:36)  154 mg/dL (08-13-22 @ 21:52)  231 mg/dL (08-13-22 @ 17:23)      eMAR:  finasteride   5 milliGRAM(s) Oral (08-14-22 @ 13:23)    insulin glargine Injectable (LANTUS)   6 Unit(s) SubCutaneous (08-13-22 @ 22:20)    insulin lispro (ADMELOG) corrective regimen sliding scale   2 Unit(s) SubCutaneous (08-14-22 @ 13:18)   1 Unit(s) SubCutaneous (08-14-22 @ 08:55)   2 Unit(s) SubCutaneous (08-13-22 @ 17:26)    insulin lispro Injectable (ADMELOG)   4 Unit(s) SubCutaneous (08-14-22 @ 13:19)   4 Unit(s) SubCutaneous (08-14-22 @ 08:56)   4 Unit(s) SubCutaneous (08-13-22 @ 17:27)    levothyroxine   50 MICROGram(s) Oral (08-14-22 @ 05:21)    predniSONE   Tablet   20 milliGRAM(s) Oral (08-14-22 @ 05:21)       POC glucose, insulin requirements, lab values reviewed. BG value 506 likely abbarent value> bg repeated 2 minutes later was 220 when repeated. per d/w RN pt tolerating po     prandial bg remains mostly above goal, increase admelog to 5 units with meals  (requiring 5-6 units with meals presently)  c/w lantus 6 units sq qhs  c/wLDSS tid ac qhs

## 2022-08-14 NOTE — PROGRESS NOTE ADULT - SUBJECTIVE AND OBJECTIVE BOX
[facility] DIVISION OF KIDNEY DISEASES AND HYPERTENSION -- FOLLOW UP NOTE  --------------------------------------------------------------------------------  Chief Complaint:  ANA    24 hour events/subjective:  PT feels ok.  Tolerated dialysis yesterday.  had low grade temp.        PAST HISTORY  --------------------------------------------------------------------------------  No significant changes to PMH, PSH, FHx, SHx, unless otherwise noted    ALLERGIES & MEDICATIONS  --------------------------------------------------------------------------------  Allergies    No Known Allergies    Intolerances      Standing Inpatient Medications  amLODIPine   Tablet 10 milliGRAM(s) Oral daily  chlorhexidine 2% Cloths 1 Application(s) Topical <User Schedule>  doxazosin 4 milliGRAM(s) Oral <User Schedule>  epoetin cortney-epbx (RETACRIT) Injectable 41508 Unit(s) SubCutaneous every 7 days  finasteride 5 milliGRAM(s) Oral daily  fosphenytoin IVPB 200 milliGRAM(s) PE IV Intermittent every 12 hours  heparin   Injectable 5000 Unit(s) SubCutaneous every 8 hours  hydrALAZINE 25 milliGRAM(s) Oral every 8 hours  insulin glargine Injectable (LANTUS) 6 Unit(s) SubCutaneous at bedtime  insulin lispro (ADMELOG) corrective regimen sliding scale   SubCutaneous three times a day before meals  insulin lispro (ADMELOG) corrective regimen sliding scale   SubCutaneous at bedtime  insulin lispro Injectable (ADMELOG) 4 Unit(s) SubCutaneous three times a day before meals  levothyroxine 50 MICROGram(s) Oral daily  melatonin 6 milliGRAM(s) Oral at bedtime  mycophenolate mofetil 250 milliGRAM(s) Oral <User Schedule>  nystatin    Suspension 429794 Unit(s) Oral four times a day  pantoprazole    Tablet 40 milliGRAM(s) Oral <User Schedule>  petrolatum white Ointment 1 Application(s) Topical two times a day  polyethylene glycol 3350 17 Gram(s) Oral daily  predniSONE   Tablet 20 milliGRAM(s) Oral daily  senna 2 Tablet(s) Oral at bedtime  sirolimus 3.5 milliGRAM(s) Oral <User Schedule>  trimethoprim   80 mG/sulfamethoxazole 400 mG 1 Tablet(s) Oral daily  valGANciclovir 450 milliGRAM(s) Oral <User Schedule>    PRN Inpatient Medications  acetaminophen     Tablet .. 650 milliGRAM(s) Oral every 6 hours PRN      REVIEW OF SYSTEMS  --------------------------------------------------------------------------------  Gen: +, weakness  Skin: No rashes  Head/Eyes/Ears/Mouth: No headache;No sore throat  Respiratory: No dyspnea, cough,   CV: No chest pain, PND, orthopnea  GI: No abdominal pain, diarrhea, constipation, nausea, vomiting  Transplant: redness at site  : No increased frequency, dysuria, hematuria, nocturia  MSK: + edema  Neuro: No dizziness/lightheadedness, weakness, seizures, numbness, tingling  Psych: No significant nervousness, anxiety, stress, depression    All other systems were reviewed and are negative, except as noted.    VITALS/PHYSICAL EXAM  --------------------------------------------------------------------------------  T(C): 37.1 (08-14-22 @ 09:00), Max: 37.7 (08-13-22 @ 21:52)  HR: 72 (08-14-22 @ 09:00) (65 - 89)  BP: 152/67 (08-14-22 @ 09:00) (140/65 - 184/69)  RR: 18 (08-14-22 @ 09:00) (18 - 24)  SpO2: 95% (08-14-22 @ 09:00) (95% - 100%)  Wt(kg): --        08-13-22 @ 07:01  -  08-14-22 @ 07:00  --------------------------------------------------------  IN: 1650 mL / OUT: 3575 mL / NET: -1925 mL    08-14-22 @ 07:01  -  08-14-22 @ 11:49  --------------------------------------------------------  IN: 240 mL / OUT: 0 mL / NET: 240 mL      Physical Exam:  	Gen: NAD, appears chronically ill   	HEENT: PERRL, supple neck,   	Pulm: CTA B/L  	CV: RRR, S1S2; no rub  	Back: No spinal or CVA tenderness; no sacral edema  	Abd: +BS, soft, nontender/nondistended                      Transplant: brawny erythema and induration - improving.   	: No suprapubic tenderness  	LE: Warm, FROM; no edema  	Neuro: No focal deficits  	Psych: Normal affect and mood        LABS/STUDIES  --------------------------------------------------------------------------------              7.6    12.79 >-----------<  336      [08-14-22 @ 06:59]              24.8     133  |  93  |  49  ----------------------------<  205      [08-14-22 @ 07:00]  3.9   |  25  |  3.17        Ca     8.4     [08-14-22 @ 07:00]      Mg     2.0     [08-14-22 @ 07:00]      Phos  2.6     [08-14-22 @ 07:00]    TPro  6.2  /  Alb  2.3  /  TBili  0.2  /  DBili  0.1  /  AST  17  /  ALT  14  /  AlkPhos  237  [08-14-22 @ 07:00]    PT/INR: PT 14.1 , INR 1.21       [08-14-22 @ 07:00]  PTT: 30.4       [08-14-22 @ 07:00]          [08-12-22 @ 17:17]    Creatinine Trend:  SCr 3.17 [08-14 @ 07:00]  SCr 3.72 [08-13 @ 07:06]  SCr 3.54 [08-12 @ 22:07]  SCr 3.14 [08-12 @ 17:17]  SCr 2.91 [08-12 @ 05:06]        Sirolimus (Rapamycin) Level, Serum: 4.0 ng/mL (08-13 @ 07:03)  Sirolimus (Rapamycin) Level, Serum: 4.6 ng/mL (08-12 @ 05:06)  Sirolimus (Rapamycin) Level, Serum: 3.6 ng/mL (08-11 @ 05:38)  Sirolimus (Rapamycin) Level, Serum: 3.8 ng/mL (08-10 @ 05:57)        Urinalysis - [07-25-22 @ 18:28]      Color Yellow / Appearance Slightly Turbid / SG 1.020 / pH 5.5      Gluc Negative / Ketone Negative  / Bili Negative / Urobili Negative       Blood Small / Protein 100 / Leuk Est Large / Nitrite Negative      RBC 2 / WBC 40 / Hyaline 5 / Gran  / Sq Epi  / Non Sq Epi 0 / Bacteria Many      Iron 17, TIBC 171, %sat 10      [05-02-22 @ 13:03]  Ferritin 6336      [07-27-22 @ 13:00]  PTH -- (Ca 9.2)      [02-05-22 @ 10:13]   294  HbA1c 6.0      [02-21-20 @ 08:53]  TSH 4.69      [07-06-22 @ 18:36]  Lipid: chol --, , HDL --, LDL --      [07-27-22 @ 13:00]    HBsAg Nonreact      [07-25-22 @ 11:51]  HCV 0.18, Nonreact      [07-25-22 @ 11:51]

## 2022-08-14 NOTE — PROGRESS NOTE ADULT - SUBJECTIVE AND OBJECTIVE BOX
[facility]   MD NINA MASON PA MIJUNG SHIN NP     TEL:  OFFICE: [phone]  DR HSU CELL: [phone]  DR. MARQUEZ CELL: [phone]  MARILYNN MORELOS CELL: [phone]  NP Lillian Buchanan CELL: [phone]    From 5pm-7am Answering Service [phone]    -- RENAL FOLLOW UP NOTE ---Date of Service 08-14-22 @ 16:30    Patient is a 66y old  Male who presents with a chief complaint of Status Epilepticus (14 Aug 2022 14:59)      Patient seen and examined at bedside. No chest pain/sob    VITALS:  T(F): 97.7 (08-14-22 @ 13:00), Max: 99.9 (08-13-22 @ 21:52)  HR: 67 (08-14-22 @ 13:00)  BP: 137/62 (08-14-22 @ 13:00)  RR: 18 (08-14-22 @ 13:00)  SpO2: 98% (08-14-22 @ 13:00)  Wt(kg): --    08-13 @ 07:01  -  08-14 @ 07:00  --------------------------------------------------------  IN: 1650 mL / OUT: 3575 mL / NET: -1925 mL    08-14 @ 07:01  -  08-14 @ 16:30  --------------------------------------------------------  IN: 720 mL / OUT: 40 mL / NET: 680 mL          PHYSICAL EXAM:  Constitutional: NAD  Neck: No JVD  Respiratory: CTAB, no wheezes, rales or rhonchi  Cardiovascular: S1, S2, RRR  Gastrointestinal: BS+, soft, NT/ND  Extremities: No peripheral edema  Vascular access: HD cath    Hospital Medications:   MEDICATIONS  (STANDING):  amLODIPine   Tablet 10 milliGRAM(s) Oral daily  chlorhexidine 2% Cloths 1 Application(s) Topical <User Schedule>  doxazosin 4 milliGRAM(s) Oral <User Schedule>  epoetin cortney-epbx (RETACRIT) Injectable 22080 Unit(s) SubCutaneous every 7 days  finasteride 5 milliGRAM(s) Oral daily  fosphenytoin IVPB 200 milliGRAM(s) PE IV Intermittent every 12 hours  heparin   Injectable 5000 Unit(s) SubCutaneous every 8 hours  hydrALAZINE 25 milliGRAM(s) Oral every 8 hours  insulin glargine Injectable (LANTUS) 6 Unit(s) SubCutaneous at bedtime  insulin lispro (ADMELOG) corrective regimen sliding scale   SubCutaneous three times a day before meals  insulin lispro (ADMELOG) corrective regimen sliding scale   SubCutaneous at bedtime  insulin lispro Injectable (ADMELOG) 5 Unit(s) SubCutaneous three times a day before meals  levothyroxine 50 MICROGram(s) Oral daily  melatonin 6 milliGRAM(s) Oral at bedtime  mycophenolate mofetil 250 milliGRAM(s) Oral <User Schedule>  nystatin    Suspension 238444 Unit(s) Oral four times a day  pantoprazole    Tablet 40 milliGRAM(s) Oral <User Schedule>  petrolatum white Ointment 1 Application(s) Topical two times a day  polyethylene glycol 3350 17 Gram(s) Oral daily  predniSONE   Tablet 20 milliGRAM(s) Oral daily  senna 2 Tablet(s) Oral at bedtime  sirolimus 3.5 milliGRAM(s) Oral <User Schedule>  trimethoprim   80 mG/sulfamethoxazole 400 mG 1 Tablet(s) Oral daily  valGANciclovir 450 milliGRAM(s) Oral <User Schedule>      LABS:  08-14    133<L>  |  93<L>  |  49<H>  ----------------------------<  205<H>  3.9   |  25  |  3.17<H>    Ca    8.4      14 Aug 2022 07:00  Phos  2.6     08-14  Mg     2.0     08-14    TPro  6.2  /  Alb  2.3<L>  /  TBili  0.2  /  DBili  0.1  /  AST  17  /  ALT  14  /  AlkPhos  237<H>  08-14    Creatinine Trend: 3.17 <--, 3.72 <--, 3.54 <--, 3.14 <--, 2.91 <--, 3.48 <--, 2.71 <--, 4.25 <--, 3.70 <--, 3.43 <--, 3.23 <--, 4.70 <--, 4.58 <--, 3.98 <--    Albumin, Serum: 2.3 g/dL (08-14 @ 07:00)  Phosphorus Level, Serum: 2.6 mg/dL (08-14 @ 07:00)                              7.6    12.79 )-----------( 336      ( 14 Aug 2022 06:59 )             24.8     Urine Studies:  Urinalysis - [07-25-22 @ 18:28]      Color Yellow / Appearance Slightly Turbid / SG 1.020 / pH 5.5      Gluc Negative / Ketone Negative  / Bili Negative / Urobili Negative       Blood Small / Protein 100 / Leuk Est Large / Nitrite Negative      RBC 2 / WBC 40 / Hyaline 5 / Gran  / Sq Epi  / Non Sq Epi 0 / Bacteria Many      Iron 17, TIBC 171, %sat 10      [05-02-22 @ 13:03]  Ferritin 6336      [07-27-22 @ 13:00]  PTH -- (Ca 9.2)      [02-05-22 @ 10:13]   294  HbA1c 6.0      [02-21-20 @ 08:53]  TSH 4.69      [07-06-22 @ 18:36]  Lipid: chol --, , HDL --, LDL --      [07-27-22 @ 13:00]    HBsAg Nonreact      [07-25-22 @ 11:51]  HCV 0.18, Nonreact      [07-25-22 @ 11:51]      RADIOLOGY & ADDITIONAL STUDIES:

## 2022-08-15 NOTE — PROGRESS NOTE ADULT - SUBJECTIVE AND OBJECTIVE BOX
Transplant Surgery - Multidisciplinary Progress Note  --------------------------------------------------------------  DDRT     4/21/2022             Present:  Patient seen with multidisciplinary team including Transplant Surgeon: Dr. Tena,  Nephrologist: Dr. NORBERTO Lomeli, NP Jose. Disciplines not in attendance will be notified of the plan.      66M with PMH: DM type II, HTN, CAD s/p CABG in 2020, AFib on Eliquis, CVA in 2019 due to Afib, Seizure d/o following CVA, last episode was on 4/8/22, h/o GIB in 2/2022 EGD with duodenal ulcer.  ESRD due to DM was on HD since 2019.     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  Donor was 58 , KDPI 82%, DCD, single vessels and ureter, HLA mismatch 1, 2, 2. No DSA, cPRA 0%. 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 Re-admitted in May for anemia in the setting of large perinephric hematoma s/p evacuation and repair of arterial anastomosis on 5/2/22. Intra operative biopsy showed no rejection, Creatinine ranging ~2mg/dL.    In Rehab:  He was recently dx with klebsiella UTI with Ertapenem - then switched to Levaquin    He also had parainfluenza pneumonia. Was at rehab progressing well.      Hospital course:  - 6/10: transferred from rehab facility for seizure status epilepticus. Intubated for respiratory protection and transferred to MICU.  EEG showed no seizure activity. Neuro consulted.   - 6/11: Extubated. Found to have R pleural effusion. s/p Thoracentesis 1.3L. Eliquis changed to heparin drip.  Post procedure drop in H&H. 5u PRBC, 2 u FFP.   - 6/11 evening: Transferred to SICU from MICU for further care in setting of hypoxia, significant R pleural effusion, anemia and hemodynamic instability  - 6/11 Intubated at 9pm and sedated on Precedex.  CT placed, 600ml blood drained.  1L total overnight, started pressors  - 6/11 Received Protamine for PTT >100 (was on Heparin drip started for AF), 2 u FFP and 5u PRBC total  - 6/13 s/p VATS 2.2L old blood removed; second chest tube placed  - 6/18-19: chest tubes removed  - 6/21: ?PRES vs. chronic ischemic changes on MRI, off Envarsus, switched to Belatacept 6/23 with good graft function  - 6/25: Tx to SICU for refractory seizures, improved with IV antiepileptic regimen  - 7/10: Downgraded from SICU to floor.   - 7/11: OR-->URETERAL STENT REMOVED  - 7/15: ESBL Kleb UTI (50-90K), completed Ertapenem x 3 Days  - 7/25: Tx to SICU for Sepsis/ elevated LFTS  - 7/27: Shiley placed for HD  - 7/28: ongoing fevers -> started Ertapenem/Fluc  - 7/29: HD restarted + 1U PRBC.  IR bx with 2A rejection, started pulse steroids. LFTs have improved  - 8/4: refractory seizures    Interval events:   - HD 8/13 -3L.  Peripheral edema improving. No HD 8/14 but planned for today  - R sided erythema around wound w/ scrotal and penile edema relatively stable. Reports minimal discomfort  - WBC 13 from 12  - IR consulted for right psoas muscle/fascia biopsy  - Minimal urine output, cloudy appearance    Immunosuppression  Induction:  Thymoglobulin                                        Maintenance: Sirolimus as of 8/2,  Cellcept to 250 BID, Pred 20mg po qd   Ongoing monitoring for signs of rejection.    Potential Discharge date: pending clinical improvement.     Education:  Medications    Plan of care:  See Below        MEDICATIONS  (STANDING):  amLODIPine   Tablet 10 milliGRAM(s) Oral daily  chlorhexidine 2% Cloths 1 Application(s) Topical <User Schedule>  doxazosin 4 milliGRAM(s) Oral <User Schedule>  epoetin cortney-epbx (RETACRIT) Injectable 19774 Unit(s) SubCutaneous every 7 days  finasteride 5 milliGRAM(s) Oral daily  fosphenytoin IVPB 200 milliGRAM(s) PE IV Intermittent every 12 hours  heparin   Injectable 5000 Unit(s) SubCutaneous every 8 hours  hydrALAZINE 25 milliGRAM(s) Oral every 8 hours  insulin glargine Injectable (LANTUS) 6 Unit(s) SubCutaneous at bedtime  insulin lispro (ADMELOG) corrective regimen sliding scale   SubCutaneous three times a day before meals  insulin lispro (ADMELOG) corrective regimen sliding scale   SubCutaneous at bedtime  insulin lispro Injectable (ADMELOG) 5 Unit(s) SubCutaneous three times a day before meals  levothyroxine 50 MICROGram(s) Oral daily  melatonin 6 milliGRAM(s) Oral at bedtime  mycophenolate mofetil 250 milliGRAM(s) Oral <User Schedule>  nystatin    Suspension 665528 Unit(s) Oral four times a day  pantoprazole    Tablet 40 milliGRAM(s) Oral <User Schedule>  petrolatum white Ointment 1 Application(s) Topical two times a day  polyethylene glycol 3350 17 Gram(s) Oral daily  senna 2 Tablet(s) Oral at bedtime  sirolimus 3.5 milliGRAM(s) Oral <User Schedule>  trimethoprim   80 mG/sulfamethoxazole 400 mG 1 Tablet(s) Oral daily  valGANciclovir 450 milliGRAM(s) Oral <User Schedule>  vancomycin  IVPB 1000 milliGRAM(s) IV Intermittent once    MEDICATIONS  (PRN):  acetaminophen     Tablet .. 650 milliGRAM(s) Oral every 6 hours PRN Mild Pain (1 - 3)  diphenhydrAMINE 25 milliGRAM(s) Oral at bedtime PRN Insomnia      PAST MEDICAL & SURGICAL HISTORY:  Hypertension      Diabetes      Dyslipidemia      CAD (Coronary Artery Disease)  with Stents in 06/2009 and 6/2019, s/p off-pump C3L on 8/13/20      Hypothyroidism      CVA (cerebral vascular accident)  12/13/19 with residual bilateral weakness      Anemia      PEG (percutaneous endoscopic gastrostomy) status  removed July 2020      Intubation of airway performed without difficulty  Dec 2019  CVA c/b status epilepticus requiring intubation and PEG in 12/2019-      ESRD on dialysis  M/W/F      Seizures  after CVA, last seizure was last week 4/07/22      History of insertion of stent into coronary artery bypass graft  2009 and 2019      S/P CABG x 3  off pump C3L on 8/13/20          Vital Signs Last 24 Hrs  T(C): 36.7 (15 Aug 2022 09:00), Max: 36.7 (15 Aug 2022 09:00)  T(F): 98 (15 Aug 2022 09:00), Max: 98 (15 Aug 2022 09:00)  HR: 75 (15 Aug 2022 09:00) (67 - 81)  BP: 149/66 (15 Aug 2022 09:00) (137/62 - 170/64)  BP(mean): --  RR: 18 (15 Aug 2022 09:00) (18 - 18)  SpO2: 95% (15 Aug 2022 09:00) (95% - 98%)    Parameters below as of 15 Aug 2022 09:00  Patient On (Oxygen Delivery Method): room air        I&O's Summary    14 Aug 2022 07:01  -  15 Aug 2022 07:00  --------------------------------------------------------  IN: 1240 mL / OUT: 440 mL / NET: 800 mL                              8.0    13.27 )-----------( 332      ( 15 Aug 2022 05:37 )             25.2     08-15    132<L>  |  93<L>  |  72<H>  ----------------------------<  153<H>  4.3   |  24  |  4.24<H>    Ca    8.5      15 Aug 2022 05:41  Phos  3.1     08-15  Mg     2.0     08-15    TPro  6.2  /  Alb  2.2<L>  /  TBili  0.2  /  DBili  0.1  /  AST  15  /  ALT  14  /  AlkPhos  223<H>  08-15          Culture - Blood (collected 08-09-22 @ 18:32)  Source: .Blood Blood-Peripheral  Final Report (08-15-22 @ 02:00):    No Growth Final    Culture - Blood (collected 08-09-22 @ 18:32)  Source: .Blood Blood-Peripheral  Final Report (08-15-22 @ 02:00):    No Growth Final    Culture - Tissue with Gram Stain (collected 08-08-22 @ 17:44)  Source: .Tissue Other  Gram Stain (08-09-22 @ 02:45):    No polymorphonuclear cells seen seen per low power field    No organisms seen per oil power field  Final Report (08-13-22 @ 18:18):    No growth at 5 days    Culture - Acid Fast - Tissue w/Smear (collected 08-08-22 @ 17:44)  Source: .Tissue Other  Preliminary Report (08-10-22 @ 15:04):    Culture is being performed.    Culture - Fungal, Tissue (collected 08-08-22 @ 17:44)  Source: .Tissue Other  Preliminary Report (08-09-22 @ 08:13):    Testing in progress                  REVIEW OF SYSTEMS  --------------------------------------------------------------------------------  unable to obtain full accurate ROS, but denies CP, SOB.    PHYSICAL EXAM:  Constitutional: awake, weak  Eyes: Anicteric  ENMT: nc/at  Respiratory: not tachypneic, non-labored on RA    Cardiovascular: normotensive, regular rate on monitor   Gastrointestinal: Soft, non distended, nontender, RLQ incisional/groin ecchymotic.   Genitourinary: anuric on HD   Extremities: SCD's in place and working bilaterally, overall with improving edema/anasarca  Vascular: Palpable dp pulses bilaterally  Neurological: AAOx2  Skin:  As noted above. No ulcerations or lesions  Musculoskeletal: Moving all extremities  Psychiatric: overall calm, following directions     Transplant Surgery - Multidisciplinary Progress Note  --------------------------------------------------------------  DDRT     4/21/2022             Present:  Patient seen with multidisciplinary team including Transplant Surgeon: Dr. Barber,   Nephrologist: Dr. JONO Lomeli, WALDO Gamino, resident Melecio Herring. Disciplines not in attendance will be notified of the plan.      66M with PMH: DM type II, HTN, CAD s/p CABG in 2020, AFib on Eliquis, CVA in 2019 due to Afib, Seizure d/o following CVA, last episode was on 4/8/22, h/o GIB in 2/2022 EGD with duodenal ulcer.  ESRD due to DM was on HD since 2019.     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  Donor was 58 , KDPI 82%, DCD, single vessels and ureter, HLA mismatch 1, 2, 2. No DSA, cPRA 0%. 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 Re-admitted in May for anemia in the setting of large perinephric hematoma s/p evacuation and repair of arterial anastomosis on 5/2/22. Intra operative biopsy showed no rejection, Creatinine ranging ~2mg/dL.    In Rehab:  He was recently dx with klebsiella UTI with Ertapenem - then switched to Levaquin    He also had parainfluenza pneumonia. Was at rehab progressing well.      Hospital course:  - 6/10: transferred from rehab facility for seizure status epilepticus. Intubated for respiratory protection and transferred to MICU.  EEG showed no seizure activity. Neuro consulted.   - 6/11: Extubated. Found to have R pleural effusion. s/p Thoracentesis 1.3L. Eliquis changed to heparin drip.  Post procedure drop in H&H. 5u PRBC, 2 u FFP.   - 6/11 evening: Transferred to SICU from MICU for further care in setting of hypoxia, significant R pleural effusion, anemia and hemodynamic instability  - 6/11 Intubated at 9pm and sedated on Precedex.  CT placed, 600ml blood drained.  1L total overnight, started pressors  - 6/11 Received Protamine for PTT >100 (was on Heparin drip started for AF), 2 u FFP and 5u PRBC total  - 6/13 s/p VATS 2.2L old blood removed; second chest tube placed  - 6/18-19: chest tubes removed  - 6/21: ?PRES vs. chronic ischemic changes on MRI, off Envarsus, switched to Belatacept 6/23 with good graft function  - 6/25: Tx to SICU for refractory seizures, improved with IV antiepileptic regimen  - 7/10: Downgraded from SICU to floor.   - 7/11: OR-->URETERAL STENT REMOVED  - 7/15: ESBL Kleb UTI (50-90K), completed Ertapenem x 3 Days  - 7/25: Tx to SICU for Sepsis/ elevated LFTS  - 7/27: Shiley placed for HD  - 7/28: ongoing fevers -> started Ertapenem/Fluc  - 7/29: HD restarted + 1U PRBC.  IR bx with 2A rejection, started pulse steroids. LFTs have improved  - 8/4: refractory seizures    Interval events:   - HD 8/13 -3L.  Peripheral edema improving. No HD 8/14 but planned for today  - R sided erythema around wound w/ scrotal and penile edema relatively stable. Reports minimal discomfort  - WBC 13 from 12  - IR consulted for right psoas muscle/fascia biopsy  - Minimal urine output, cloudy appearance    Immunosuppression  Induction:  Thymoglobulin                                        Maintenance: Sirolimus as of 8/2,  Cellcept to 250 BID, Pred 20mg po qd   Ongoing monitoring for signs of rejection.    Potential Discharge date: pending clinical improvement.     Education:  Medications    Plan of care:  See Below        MEDICATIONS  (STANDING):  amLODIPine   Tablet 10 milliGRAM(s) Oral daily  chlorhexidine 2% Cloths 1 Application(s) Topical <User Schedule>  doxazosin 4 milliGRAM(s) Oral <User Schedule>  epoetin cortney-epbx (RETACRIT) Injectable 15634 Unit(s) SubCutaneous every 7 days  finasteride 5 milliGRAM(s) Oral daily  fosphenytoin IVPB 200 milliGRAM(s) PE IV Intermittent every 12 hours  heparin   Injectable 5000 Unit(s) SubCutaneous every 8 hours  hydrALAZINE 25 milliGRAM(s) Oral every 8 hours  insulin glargine Injectable (LANTUS) 6 Unit(s) SubCutaneous at bedtime  insulin lispro (ADMELOG) corrective regimen sliding scale   SubCutaneous three times a day before meals  insulin lispro (ADMELOG) corrective regimen sliding scale   SubCutaneous at bedtime  insulin lispro Injectable (ADMELOG) 5 Unit(s) SubCutaneous three times a day before meals  levothyroxine 50 MICROGram(s) Oral daily  melatonin 6 milliGRAM(s) Oral at bedtime  mycophenolate mofetil 250 milliGRAM(s) Oral <User Schedule>  nystatin    Suspension 334743 Unit(s) Oral four times a day  pantoprazole    Tablet 40 milliGRAM(s) Oral <User Schedule>  petrolatum white Ointment 1 Application(s) Topical two times a day  polyethylene glycol 3350 17 Gram(s) Oral daily  senna 2 Tablet(s) Oral at bedtime  sirolimus 3.5 milliGRAM(s) Oral <User Schedule>  trimethoprim   80 mG/sulfamethoxazole 400 mG 1 Tablet(s) Oral daily  valGANciclovir 450 milliGRAM(s) Oral <User Schedule>  vancomycin  IVPB 1000 milliGRAM(s) IV Intermittent once    MEDICATIONS  (PRN):  acetaminophen     Tablet .. 650 milliGRAM(s) Oral every 6 hours PRN Mild Pain (1 - 3)  diphenhydrAMINE 25 milliGRAM(s) Oral at bedtime PRN Insomnia      PAST MEDICAL & SURGICAL HISTORY:  Hypertension      Diabetes      Dyslipidemia      CAD (Coronary Artery Disease)  with Stents in 06/2009 and 6/2019, s/p off-pump C3L on 8/13/20      Hypothyroidism      CVA (cerebral vascular accident)  12/13/19 with residual bilateral weakness      Anemia      PEG (percutaneous endoscopic gastrostomy) status  removed July 2020      Intubation of airway performed without difficulty  Dec 2019  CVA c/b status epilepticus requiring intubation and PEG in 12/2019-      ESRD on dialysis  M/W/F      Seizures  after CVA, last seizure was last week 4/07/22      History of insertion of stent into coronary artery bypass graft  2009 and 2019      S/P CABG x 3  off pump C3L on 8/13/20          Vital Signs Last 24 Hrs  T(C): 36.7 (15 Aug 2022 09:00), Max: 36.7 (15 Aug 2022 09:00)  T(F): 98 (15 Aug 2022 09:00), Max: 98 (15 Aug 2022 09:00)  HR: 75 (15 Aug 2022 09:00) (67 - 81)  BP: 149/66 (15 Aug 2022 09:00) (137/62 - 170/64)  BP(mean): --  RR: 18 (15 Aug 2022 09:00) (18 - 18)  SpO2: 95% (15 Aug 2022 09:00) (95% - 98%)    Parameters below as of 15 Aug 2022 09:00  Patient On (Oxygen Delivery Method): room air        I&O's Summary    14 Aug 2022 07:01  -  15 Aug 2022 07:00  --------------------------------------------------------  IN: 1240 mL / OUT: 440 mL / NET: 800 mL                              8.0    13.27 )-----------( 332      ( 15 Aug 2022 05:37 )             25.2     08-15    132<L>  |  93<L>  |  72<H>  ----------------------------<  153<H>  4.3   |  24  |  4.24<H>    Ca    8.5      15 Aug 2022 05:41  Phos  3.1     08-15  Mg     2.0     08-15    TPro  6.2  /  Alb  2.2<L>  /  TBili  0.2  /  DBili  0.1  /  AST  15  /  ALT  14  /  AlkPhos  223<H>  08-15          Culture - Blood (collected 08-09-22 @ 18:32)  Source: .Blood Blood-Peripheral  Final Report (08-15-22 @ 02:00):    No Growth Final    Culture - Blood (collected 08-09-22 @ 18:32)  Source: .Blood Blood-Peripheral  Final Report (08-15-22 @ 02:00):    No Growth Final    Culture - Tissue with Gram Stain (collected 08-08-22 @ 17:44)  Source: .Tissue Other  Gram Stain (08-09-22 @ 02:45):    No polymorphonuclear cells seen seen per low power field    No organisms seen per oil power field  Final Report (08-13-22 @ 18:18):    No growth at 5 days    Culture - Acid Fast - Tissue w/Smear (collected 08-08-22 @ 17:44)  Source: .Tissue Other  Preliminary Report (08-10-22 @ 15:04):    Culture is being performed.    Culture - Fungal, Tissue (collected 08-08-22 @ 17:44)  Source: .Tissue Other  Preliminary Report (08-09-22 @ 08:13):    Testing in progress                  REVIEW OF SYSTEMS  --------------------------------------------------------------------------------  unable to obtain full accurate ROS, but denies CP, SOB.    PHYSICAL EXAM:  Constitutional: awake, weak  Eyes: Anicteric  ENMT: nc/at  Respiratory: not tachypneic, non-labored on RA    Cardiovascular: normotensive, regular rate on monitor   Gastrointestinal: Soft, non distended, nontender, RLQ incisional/groin ecchymotic.   Genitourinary: anuric on HD   Extremities: SCD's in place and working bilaterally, overall with improving edema/anasarca  Vascular: Palpable dp pulses bilaterally  Neurological: AAOx2  Skin:  As noted above. No ulcerations or lesions  Musculoskeletal: Moving all extremities  Psychiatric: overall calm, following directions

## 2022-08-15 NOTE — PROGRESS NOTE ADULT - NUTRITIONAL ASSESSMENT

## 2022-08-15 NOTE — PROGRESS NOTE ADULT - ASSESSMENT
66 year old male with PMH of  DM type II, HTN, CAD s/p CABG in 2020, Afib on Eliquis, CVA in 2019 due to Afib, Seizure d/o (last episode was on 4/8/22), h/o GI bleed in 2/2022 EGD with duodenal ulcer and ESRD on HD s/p R DDRT from DCD donor on 4/21/22 complicated by DGF requiring HD until 4/29/22, later had good graft function who was readmitted with status epilepticus in setting of UTI/antibiotics and sub-therapeutic valproic acid level.  Transferred to SICU on 7/25 with signs of sepsis. 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 Received pulse steroids (Solumedrol 500 on 7/30 -> 250 on 7/31).     1. 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 No  DSA.   s/p pulse dose steroids. Thymo was not given to treat the rejection as he is too frail and at increased risk for infections. Now with failed allograft function, Remains anuric and is dialysis dependent.   Pt underwent IR guided HD catheter exchange on 8/8/22.  Has been receiving dialysis daily. Plan for HD today. Monitor for labs.     2. IS meds- was on Belatacept. now on Sirolimus ,  mg po bid and steroid taper.     3. AMS , seizures - had 3 episodes of seizures on 8/3. CT head on 8/3 was negative. On Fosphenytoin. No further episodes of seizures since 8/3. Neurology following.    4. DM -  on Lantus and lispro.     5. Cellulitis over RLQ - WBC stable. On Vancomycin . CT A/P on 8/6 revealed a retroperitoneal hematoma. Txp USG 8/6  - Patent renal artery vasculature. Stable appearance the transplant kidney .Persistent elevated resistive indices  Diffuse abdominal wall edematous changes right lower quadrant without focal collection, cultures from 8/3  negative so far. Skin bx done on 8/8/22 showed no significant findings. ID follow up.  Improving.  Plan for muscle biopsy     6.  ESBL klebsiella UTI (7/15 + UCx, 7/25 +UCx)- completed a course of  Ertapenam on  8/3    7. LE edema: Pt with B/L LE/UE swelling R>L, doppler studies negative of DVT. Continue Subq Heparin. Cannot use Eliquis given interaction with Dilantin.  Edema greatly improving with fluid removal. Plan for HD today with UF of 3 kgs.

## 2022-08-15 NOTE — PROVIDER CONTACT NOTE (OTHER) - ASSESSMENT
Pt. A&Ox3-4 confused at times, VS as charted. Pt denies shivers, coldness/hotness, pain. Pt. asymptomatic

## 2022-08-15 NOTE — PROGRESS NOTE ADULT - SUBJECTIVE AND OBJECTIVE BOX
Dr. Barrientos  Office [phone] (9 am to 5 pm)  Service: [phone] (5pm to 9am)  Karolina HAAS      RENAL PROGRESS NOTE: DATE OF SERVICE 08-15-22 @ 13:05    Patient is a 66y old  Male who presents with a chief complaint of Status Epilepticus (15 Aug 2022 12:39)      Patient seen and examined at bedside. No chest pain/sob    VITALS:  T(F): 98 (08-15-22 @ 09:00), Max: 98 (08-15-22 @ 09:00)  HR: 75 (08-15-22 @ 09:00)  BP: 149/66 (08-15-22 @ 09:00)  RR: 18 (08-15-22 @ 09:00)  SpO2: 95% (08-15-22 @ 09:00)  Wt(kg): --    08-14 @ 07:01  -  08-15 @ 07:00  --------------------------------------------------------  IN: 1240 mL / OUT: 440 mL / NET: 800 mL          PHYSICAL EXAM:  Constitutional: NAD  Neck: No JVD  Respiratory: CTAB, no wheezes, rales or rhonchi  Cardiovascular: S1, S2, RRR  Gastrointestinal: BS+, soft, NT/ND  Extremities: No peripheral edema    Hospital Medications:   MEDICATIONS  (STANDING):  amLODIPine   Tablet 10 milliGRAM(s) Oral daily  chlorhexidine 2% Cloths 1 Application(s) Topical <User Schedule>  doxazosin 4 milliGRAM(s) Oral <User Schedule>  epoetin cortney-epbx (RETACRIT) Injectable 29447 Unit(s) SubCutaneous every 7 days  finasteride 5 milliGRAM(s) Oral daily  fosphenytoin IVPB 200 milliGRAM(s) PE IV Intermittent every 12 hours  heparin   Injectable 5000 Unit(s) SubCutaneous every 8 hours  hydrALAZINE 25 milliGRAM(s) Oral every 8 hours  insulin glargine Injectable (LANTUS) 6 Unit(s) SubCutaneous at bedtime  insulin lispro (ADMELOG) corrective regimen sliding scale   SubCutaneous three times a day before meals  insulin lispro (ADMELOG) corrective regimen sliding scale   SubCutaneous at bedtime  insulin lispro Injectable (ADMELOG) 5 Unit(s) SubCutaneous three times a day before meals  levothyroxine 50 MICROGram(s) Oral daily  melatonin 6 milliGRAM(s) Oral at bedtime  mycophenolate mofetil 250 milliGRAM(s) Oral <User Schedule>  nystatin    Suspension 830448 Unit(s) Oral four times a day  pantoprazole    Tablet 40 milliGRAM(s) Oral <User Schedule>  petrolatum white Ointment 1 Application(s) Topical two times a day  polyethylene glycol 3350 17 Gram(s) Oral daily  senna 2 Tablet(s) Oral at bedtime  sirolimus 3.5 milliGRAM(s) Oral <User Schedule>  trimethoprim   80 mG/sulfamethoxazole 400 mG 1 Tablet(s) Oral daily  valGANciclovir 450 milliGRAM(s) Oral <User Schedule>      LABS:  08-15    132<L>  |  93<L>  |  72<H>  ----------------------------<  153<H>  4.3   |  24  |  4.24<H>    Ca    8.5      15 Aug 2022 05:41  Phos  3.1     08-15  Mg     2.0     08-15    TPro  6.2  /  Alb  2.2<L>  /  TBili  0.2  /  DBili  0.1  /  AST  15  /  ALT  14  /  AlkPhos  223<H>  08-15    Creatinine Trend: 4.24 <--, 3.17 <--, 3.72 <--, 3.54 <--, 3.14 <--, 2.91 <--, 3.48 <--, 2.71 <--, 4.25 <--, 3.70 <--, 3.43 <--, 3.23 <--, 4.70 <--    Albumin, Serum: 2.2 g/dL (08-15 @ 05:41)  Phosphorus Level, Serum: 3.1 mg/dL (08-15 @ 05:41)                              8.0    13.27 )-----------( 332      ( 15 Aug 2022 05:37 )             25.2     Urine Studies:  Urinalysis - [07-25-22 @ 18:28]      Color Yellow / Appearance Slightly Turbid / SG 1.020 / pH 5.5      Gluc Negative / Ketone Negative  / Bili Negative / Urobili Negative       Blood Small / Protein 100 / Leuk Est Large / Nitrite Negative      RBC 2 / WBC 40 / Hyaline 5 / Gran  / Sq Epi  / Non Sq Epi 0 / Bacteria Many      Iron 17, TIBC 171, %sat 10      [05-02-22 @ 13:03]  Ferritin 6336      [07-27-22 @ 13:00]  PTH -- (Ca 9.2)      [02-05-22 @ 10:13]   294  HbA1c 6.0      [02-21-20 @ 08:53]  TSH 4.69      [07-06-22 @ 18:36]  Lipid: chol --, , HDL --, LDL --      [07-27-22 @ 13:00]    HBsAg Nonreact      [07-25-22 @ 11:51]  HCV 0.18, Nonreact      [07-25-22 @ 11:51]      RADIOLOGY & ADDITIONAL STUDIES:

## 2022-08-15 NOTE — PROGRESS NOTE ADULT - SUBJECTIVE AND OBJECTIVE BOX
Follow Up:      Interval History:    REVIEW OF SYSTEMS  [  ] ROS unobtainable because:    [  ] All other systems negative except as noted below    Constitutional:  [ ] fever [ ] chills  [ ] weight loss  [ ] weakness  Skin:  [ ] rash [ ] phlebitis	  Eyes: [ ] icterus [ ] pain  [ ] discharge	  ENMT: [ ] sore throat  [ ] thrush [ ] ulcers [ ] exudates  Respiratory: [ ] dyspnea [ ] hemoptysis [ ] cough [ ] sputum	  Cardiovascular:  [ ] chest pain [ ] palpitations [ ] edema	  Gastrointestinal:  [ ] nausea [ ] vomiting [ ] diarrhea [ ] constipation [ ] pain	  Genitourinary:  [ ] dysuria [ ] frequency [ ] hematuria [ ] discharge [ ] flank pain  [ ] incontinence  Musculoskeletal:  [ ] myalgias [ ] arthralgias [ ] arthritis  [ ] back pain  Neurological:  [ ] headache [ ] seizures  [ ] confusion/altered mental status    Allergies  No Known Allergies        ANTIMICROBIALS:  nystatin    Suspension 401749 four times a day  trimethoprim   80 mG/sulfamethoxazole 400 mG 1 daily  valGANciclovir 450 <User Schedule>      OTHER MEDS:  MEDICATIONS  (STANDING):  acetaminophen     Tablet .. 650 every 6 hours PRN  amLODIPine   Tablet 10 daily  diphenhydrAMINE 25 at bedtime PRN  doxazosin 4 <User Schedule>  epoetin cortney-epbx (RETACRIT) Injectable 29748 every 7 days  finasteride 5 daily  fosphenytoin IVPB 200 every 12 hours  hydrALAZINE 25 every 8 hours  insulin glargine Injectable (LANTUS) 6 at bedtime  insulin lispro (ADMELOG) corrective regimen sliding scale  three times a day before meals  insulin lispro (ADMELOG) corrective regimen sliding scale  at bedtime  insulin lispro Injectable (ADMELOG) 7 three times a day before meals  levothyroxine 50 daily  melatonin 6 at bedtime  mycophenolate mofetil 250 <User Schedule>  pantoprazole    Tablet 40 <User Schedule>  polyethylene glycol 3350 17 daily  senna 2 at bedtime      Vital Signs Last 24 Hrs  T(C): 36.7 (15 Aug 2022 14:55), Max: 36.7 (15 Aug 2022 09:00)  T(F): 98.1 (15 Aug 2022 14:55), Max: 98.1 (15 Aug 2022 14:55)  HR: 65 (15 Aug 2022 14:55) (65 - 75)  BP: 168/74 (15 Aug 2022 14:55) (147/56 - 170/64)  BP(mean): --  RR: 18 (15 Aug 2022 14:55) (18 - 18)  SpO2: 96% (15 Aug 2022 14:55) (95% - 98%)    Parameters below as of 15 Aug 2022 14:55  Patient On (Oxygen Delivery Method): room air        PHYSICAL EXAMINATION:  General: Alert and Awake, NAD  HEENT: PERRL, EOMI  Neck: Supple  Cardiac: RRR, No M/R/G  Resp: CTAB, No Wh/Rh/Ra  Abdomen: NBS, NT/ND, No HSM, No rigidity or guarding  MSK: No LE edema. No Calf tenderness  : No tucker  Skin: No rashes or lesions. Skin is warm and dry to the touch.   Neuro: Alert and Awake. CN 2-12 Grossly intact. Moves all four extremities spontaneously.  Psych: Calm, Pleasant, Cooperative                          8.0    13.27 )-----------( 332      ( 15 Aug 2022 05:37 )             25.2       08-15    132<L>  |  93<L>  |  72<H>  ----------------------------<  153<H>  4.3   |  24  |  4.24<H>    Ca    8.5      15 Aug 2022 05:41  Phos  3.1     08-15  Mg     2.0     08-15    TPro  6.2  /  Alb  2.2<L>  /  TBili  0.2  /  DBili  0.1  /  AST  15  /  ALT  14  /  AlkPhos  223<H>  08-15          MICROBIOLOGY:  Vancomycin Level, Random: 14.5 ug/mL (08-15-22 @ 05:45)  v  .Blood Blood-Peripheral  08-09-22   No Growth Final  --  --      .Tissue Other  08-08-22   Testing in progress  --    No polymorphonuclear cells seen seen per low power field  No organisms seen per oil power field      .Blood Blood-Peripheral  08-08-22   Growth in aerobic bottle: Staphylococcus hominis  Growth in aerobic bottle: Staphylococcus epidermidis  Coag Negative Staphylococcus  Single set isolate, possible contaminant. Contact  Microbiology if susceptibility testing clinically  indicated.  ***Blood Panel PCR results on this specimen are available  approximately 3 hours after the Gram stain result.***  Gram stain, PCR, and/or culture results may not always  correspond due to difference in methodologies.  ************************************************************  This PCR assay was performed by multiplex PCR. This  Assay tests for 66 bacterial and resistance gene targets.  Please refer to the [facility] test directory  at [email or web address] for details.  --  Blood Culture PCR      .Blood Blood  08-03-22   No Growth Final  --  --      .Blood Blood-Peripheral  07-30-22   No Growth Final  --  --      .Blood Blood-Peripheral  07-30-22   No Growth Final  --  --      .Blood Blood  07-27-22   No growth  --  --      Catheterized Catheterized  07-25-22   50,000 - 99,000 CFU/mL Klebsiella pneumoniae ESBL  --  Klebsiella pneumoniae ESBL      .Blood Blood  07-25-22   No Growth Final  --  --      .Blood Blood  07-25-22   No Growth Final  --  --                RADIOLOGY:    <The imaging below has been reviewed and visualized by me independently. Findings as detailed in report below> Follow Up:  abdominal wall rash    Interval History: afebrile. no acute overnight events.     REVIEW OF SYSTEMS  [  ] ROS unobtainable because:    [ x ] All other systems negative except as noted below    Constitutional:  [ ] fever [ ] chills  [ ] weight loss  [ ] weakness  Skin:  [ x] rash [ ] phlebitis	  Eyes: [ ] icterus [ ] pain  [ ] discharge	  ENMT: [ ] sore throat  [ ] thrush [ ] ulcers [ ] exudates  Respiratory: [ ] dyspnea [ ] hemoptysis [ ] cough [ ] sputum	  Cardiovascular:  [ ] chest pain [ ] palpitations [ ] edema	  Gastrointestinal:  [ ] nausea [ ] vomiting [ ] diarrhea [ ] constipation [ ] pain	  Genitourinary:  [ ] dysuria [ ] frequency [ ] hematuria [ ] discharge [ ] flank pain  [ ] incontinence  Musculoskeletal:  [ ] myalgias [ ] arthralgias [ ] arthritis  [ ] back pain  Neurological:  [ ] headache [ ] seizures  [ ] confusion/altered mental status    Allergies  No Known Allergies        ANTIMICROBIALS:  nystatin    Suspension 194444 four times a day  trimethoprim   80 mG/sulfamethoxazole 400 mG 1 daily  valGANciclovir 450 <User Schedule>      OTHER MEDS:  MEDICATIONS  (STANDING):  acetaminophen     Tablet .. 650 every 6 hours PRN  amLODIPine   Tablet 10 daily  diphenhydrAMINE 25 at bedtime PRN  doxazosin 4 <User Schedule>  epoetin cortney-epbx (RETACRIT) Injectable 35993 every 7 days  finasteride 5 daily  fosphenytoin IVPB 200 every 12 hours  hydrALAZINE 25 every 8 hours  insulin glargine Injectable (LANTUS) 6 at bedtime  insulin lispro (ADMELOG) corrective regimen sliding scale  three times a day before meals  insulin lispro (ADMELOG) corrective regimen sliding scale  at bedtime  insulin lispro Injectable (ADMELOG) 7 three times a day before meals  levothyroxine 50 daily  melatonin 6 at bedtime  mycophenolate mofetil 250 <User Schedule>  pantoprazole    Tablet 40 <User Schedule>  polyethylene glycol 3350 17 daily  senna 2 at bedtime      Vital Signs Last 24 Hrs  T(C): 36.7 (15 Aug 2022 14:55), Max: 36.7 (15 Aug 2022 09:00)  T(F): 98.1 (15 Aug 2022 14:55), Max: 98.1 (15 Aug 2022 14:55)  HR: 65 (15 Aug 2022 14:55) (65 - 75)  BP: 168/74 (15 Aug 2022 14:55) (147/56 - 170/64)  BP(mean): --  RR: 18 (15 Aug 2022 14:55) (18 - 18)  SpO2: 96% (15 Aug 2022 14:55) (95% - 98%)    Parameters below as of 15 Aug 2022 14:55  Patient On (Oxygen Delivery Method): room air    PHYSICAL EXAMINATION:  General: Alert and Awake, NAD  Cardiac: RRR, No M/R/G  Resp: CTAB, No Wh/Rh/Ra  Abdomen: RLQ with erythema and induration overlying the renal transplant site with extension into the groin.   MSK: No LE edema. No Calf tenderness  Skin: No rashes or lesions. Skin is warm and dry to the touch.   Neuro: Alert and Awake. CN 2-12 Grossly intact. Moves all four extremities spontaneously.  Psych: Calm, Pleasant, Cooperative                          8.0    13.27 )-----------( 332      ( 15 Aug 2022 05:37 )             25.2       08-15    132<L>  |  93<L>  |  72<H>  ----------------------------<  153<H>  4.3   |  24  |  4.24<H>    Ca    8.5      15 Aug 2022 05:41  Phos  3.1     08-15  Mg     2.0     08-15    TPro  6.2  /  Alb  2.2<L>  /  TBili  0.2  /  DBili  0.1  /  AST  15  /  ALT  14  /  AlkPhos  223<H>  08-15          MICROBIOLOGY:  Vancomycin Level, Random: 14.5 ug/mL (08-15-22 @ 05:45)  v  .Blood Blood-Peripheral  08-09-22   No Growth Final  --  --      .Tissue Other  08-08-22   Testing in progress  --    No polymorphonuclear cells seen seen per low power field  No organisms seen per oil power field      .Blood Blood-Peripheral  08-08-22   Growth in aerobic bottle: Staphylococcus hominis  Growth in aerobic bottle: Staphylococcus epidermidis  Coag Negative Staphylococcus  Single set isolate, possible contaminant. Contact  Microbiology if susceptibility testing clinically  indicated.  ***Blood Panel PCR results on this specimen are available  approximately 3 hours after the Gram stain result.***  Gram stain, PCR, and/or culture results may not always  correspond due to difference in methodologies.  ************************************************************  This PCR assay was performed by multiplex PCR. This  Assay tests for 66 bacterial and resistance gene targets.  Please refer to the [facility] test directory  at [email or web address] for details.  --  Blood Culture PCR      .Blood Blood  08-03-22   No Growth Final  --  --      .Blood Blood-Peripheral  07-30-22   No Growth Final  --  --      .Blood Blood-Peripheral  07-30-22   No Growth Final  --  --      .Blood Blood  07-27-22   No growth  --  --      Catheterized Catheterized  07-25-22   50,000 - 99,000 CFU/mL Klebsiella pneumoniae ESBL  --  Klebsiella pneumoniae ESBL      .Blood Blood  07-25-22   No Growth Final  --  --      .Blood Blood  07-25-22   No Growth Final  --  --    RADIOLOGY:    <The imaging below has been reviewed and visualized by me independently. Findings as detailed in report below>    < from: [facility] Duplex Lower Extrem Arterial, Bilat (08.10.22 @ 13:09) >  Impression: Lower extremity arterial vascular disease is NOT identified.    < end of copied text >

## 2022-08-15 NOTE — PROGRESS NOTE ADULT - ATTENDING COMMENTS
66 year old male with PMH of  DM type II, HTN, CAD s/p CABG in 2020, Afib on Eliquis, CVA in 2019 due to Afib, Seizure d/o (last episode was on 4/8/22), h/o GI bleed in 2/2022 EGD with duodenal ulcer and ESRD on HD s/p R DDRT from DCD donor on 4/21/22 complicated by DGF requiring HD until 4/29/22, later had good graft function who was readmitted with status epilepticus in setting of UTI/antibiotics and sub-therapeutic valproic acid level.  Transferred to SICU on 7/25 with signs of sepsis. Biopsy from 7/29 demonstrating grade 2a rejection and is now dialysis dependent.    1. 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 No  DSA.   s/p pulse dose steroids. Thymo was not given to treat the rejection as he is too frail and at increased risk for infections. Now with failed allograft function, Remains anuric and is dialysis dependent.   Pt underwent IR guided HD catheter exchange on 8/8/22.  Has been receiving dialysis daily. Plan for HD today.     2. IS meds-  on Sirolimus ,  mg po bid and steroid taper.   3. AMS , seizures - On Fosphenytoin. No further episodes of seizures since 8/3. Neurology following.  4. DM -  on Lantus and lispro.   5. Cellulitis over RLQ - On Vancomycin . CT A/P on 8/6 revealed a retroperitoneal hematoma. Txp USG 8/6  - Patent renal artery vasculature. Stable appearance the transplant kidney .Persistent elevated resistive indices. Diffuse abdominal wall edematous changes right lower quadrant without focal collection, cultures from 8/3  negative so far. Skin bx done on 8/8/22 showed no significant findings. ID follow up.  Improving.  Plan for muscle biopsy

## 2022-08-15 NOTE — PROGRESS NOTE ADULT - SUBJECTIVE AND OBJECTIVE BOX
HPI:      Patient is a 66  M with Type 2 DM> unknown control due to skewed A1C 6.6% secondary to chronic anemia and s/p blood tx. On basal/bolus insulin therapy PTA. DM c/b ESRD s/p DDRT on 3/21, CVA, CAD s/p CABG, Afib on apixaban, seizure activity, HTN, HLD, h/o GI bleed in 2/2022 EGD with duodenal ulcer, discharged to [facility] rehab center after prior hospitalization, now re-admitted from [facility] for seizures c/f status epilepticus in setting of UTI. Endocrine consulted for steroid induced hyperglycemia. Prolonged hospital course w/ ICU stay, previously intubated/extubated, previous seizures. S/p ureteral stent removal 7/12/22.        Home diabetes medications:   -     Inpatient diabetes medications:   Glargine 6 units QHS  - Admelog 5 TIDAC  - LDCC     Most recent HbA1C 6.0 not reliable due to anemia and ESRD       INTERVAL HPI/OVERNIGHT EVENTS:  Was on Prednisone 20mg daily for recent rejection with anuria and worsening creat, last dose was this morning.   Currently denies polydipsia, polyuria , visual changes, numbness in feet.      Review of systems:   CONSTITUTIONAL:  Feels well, good appetite  CARDIOVASCULAR:  Negative for chest pain or palpitations  RESPIRATORY:  Negative for cough, or SOB   GASTROINTESTINAL:  Negative for nausea, vomiting, or abdominal pain  GENITOURINARY:  Negative frequency, urgency or dysuria   HbA1C:   CAPILLARY BLOOD GLUCOSE      POCT Blood Glucose.: 190 mg/dL (15 Aug 2022 12:31)  POCT Blood Glucose.: 175 mg/dL (15 Aug 2022 08:28)  POCT Blood Glucose.: 178 mg/dL (14 Aug 2022 21:20)  POCT Blood Glucose.: 225 mg/dL (14 Aug 2022 17:11)       MEDICATIONS  (STANDING):  amLODIPine   Tablet 10 milliGRAM(s) Oral daily  chlorhexidine 2% Cloths 1 Application(s) Topical <User Schedule>  doxazosin 4 milliGRAM(s) Oral <User Schedule>  epoetin cortney-epbx (RETACRIT) Injectable 43301 Unit(s) SubCutaneous every 7 days  finasteride 5 milliGRAM(s) Oral daily  fosphenytoin IVPB 200 milliGRAM(s) PE IV Intermittent every 12 hours  heparin   Injectable 5000 Unit(s) SubCutaneous every 8 hours  hydrALAZINE 25 milliGRAM(s) Oral every 8 hours  insulin glargine Injectable (LANTUS) 6 Unit(s) SubCutaneous at bedtime  insulin lispro (ADMELOG) corrective regimen sliding scale   SubCutaneous three times a day before meals  insulin lispro (ADMELOG) corrective regimen sliding scale   SubCutaneous at bedtime  insulin lispro Injectable (ADMELOG) 7 Unit(s) SubCutaneous three times a day before meals  levothyroxine 50 MICROGram(s) Oral daily  melatonin 6 milliGRAM(s) Oral at bedtime  mycophenolate mofetil 250 milliGRAM(s) Oral <User Schedule>  nystatin    Suspension 845863 Unit(s) Oral four times a day  pantoprazole    Tablet 40 milliGRAM(s) Oral <User Schedule>  petrolatum white Ointment 1 Application(s) Topical two times a day  polyethylene glycol 3350 17 Gram(s) Oral daily  senna 2 Tablet(s) Oral at bedtime  sirolimus 3.5 milliGRAM(s) Oral <User Schedule>  trimethoprim   80 mG/sulfamethoxazole 400 mG 1 Tablet(s) Oral daily  valGANciclovir 450 milliGRAM(s) Oral <User Schedule>    MEDICATIONS  (PRN):  acetaminophen     Tablet .. 650 milliGRAM(s) Oral every 6 hours PRN Mild Pain (1 - 3)  diphenhydrAMINE 25 milliGRAM(s) Oral at bedtime PRN Insomnia      PHYSICAL EXAM  Vital Signs Last 24 Hrs  T(C): 36.2 (15 Aug 2022 13:00), Max: 36.7 (15 Aug 2022 09:00)  T(F): 97.2 (15 Aug 2022 13:00), Max: 98 (15 Aug 2022 09:00)  HR: 67 (15 Aug 2022 13:00) (67 - 81)  BP: 166/71 (15 Aug 2022 13:00) (147/56 - 170/64)  BP(mean): --  RR: 18 (15 Aug 2022 13:00) (18 - 18)  SpO2: 96% (15 Aug 2022 13:00) (95% - 98%)    Parameters below as of 15 Aug 2022 13:00  Patient On (Oxygen Delivery Method): room air        GENERAL: Male laying in bed in NAD  Abdomen: Soft, nontender, non distended  Extremities: Warm, no edema in all 4 exts   NEURO: A&O X3    LABS:                        8.0    13.27 )-----------( 332      ( 15 Aug 2022 05:37 )             25.2     08-15    132<L>  |  93<L>  |  72<H>  ----------------------------<  153<H>  4.3   |  24  |  4.24<H>    Ca    8.5      15 Aug 2022 05:41  Phos  3.1     08-15  Mg     2.0     08-15    TPro  6.2  /  Alb  2.2<L>  /  TBili  0.2  /  DBili  0.1  /  AST  15  /  ALT  14  /  AlkPhos  223<H>  08-15    PT/INR - ( 15 Aug 2022 05:45 )   PT: 13.8 sec;   INR: 1.20 ratio         PTT - ( 15 Aug 2022 05:45 )  PTT:29.7 sec    Thyroid Stimulating Hormone, Serum: 4.69 uIU/mL (07-06 @ 18:36)          HPI:      Patient is a 66  M with Type 2 DM> unknown control due to skewed A1C 6.6% secondary to chronic anemia and s/p blood tx. On basal/bolus insulin therapy PTA. DM c/b ESRD s/p DDRT on 3/21, CVA, CAD s/p CABG, Afib on apixaban, seizure activity, HTN, HLD, h/o GI bleed in 2/2022 EGD with duodenal ulcer, discharged to [facility] rehab center after prior hospitalization, now re-admitted from [facility] for seizures c/f status epilepticus in setting of UTI. Endocrine consulted for steroid induced hyperglycemia. Prolonged hospital course w/ ICU stay, previously intubated/extubated, previous seizures. S/p ureteral stent removal 7/12/22.        Home diabetes medications:   -  Semglee 12 units qhs, humalog 5 units TID pre-meal.    Inpatient diabetes medications:   Glargine 6 units QHS  - Admelog 5 TIDAC  - LDCC     Most recent HbA1C 6.0 not reliable due to anemia and ESRD       INTERVAL HPI/OVERNIGHT EVENTS:  Was on Prednisone 20mg daily for recent rejection with anuria and worsening creat, last dose was this morning. Tapering down to Prednisone 10 mg starting tomorrow.     Currently denies polydipsia, polyuria , visual changes, numbness in feet. Denies chest pain, nausea or vomiting.       Review of systems:   CONSTITUTIONAL:  Feels well, good appetite  CARDIOVASCULAR:  Negative for chest pain or palpitations  RESPIRATORY:  Negative for cough, or SOB   GASTROINTESTINAL:  Negative for nausea, vomiting, or abdominal pain  GENITOURINARY:  Negative frequency, urgency or dysuria     CAPILLARY BLOOD GLUCOSE      POCT Blood Glucose.: 190 mg/dL (15 Aug 2022 12:31)  POCT Blood Glucose.: 175 mg/dL (15 Aug 2022 08:28)  POCT Blood Glucose.: 178 mg/dL (14 Aug 2022 21:20)  POCT Blood Glucose.: 225 mg/dL (14 Aug 2022 17:11)       MEDICATIONS  (STANDING):  amLODIPine   Tablet 10 milliGRAM(s) Oral daily  chlorhexidine 2% Cloths 1 Application(s) Topical <User Schedule>  doxazosin 4 milliGRAM(s) Oral <User Schedule>  epoetin cortney-epbx (RETACRIT) Injectable 48714 Unit(s) SubCutaneous every 7 days  finasteride 5 milliGRAM(s) Oral daily  fosphenytoin IVPB 200 milliGRAM(s) PE IV Intermittent every 12 hours  heparin   Injectable 5000 Unit(s) SubCutaneous every 8 hours  hydrALAZINE 25 milliGRAM(s) Oral every 8 hours  insulin glargine Injectable (LANTUS) 6 Unit(s) SubCutaneous at bedtime  insulin lispro (ADMELOG) corrective regimen sliding scale   SubCutaneous three times a day before meals  insulin lispro (ADMELOG) corrective regimen sliding scale   SubCutaneous at bedtime  insulin lispro Injectable (ADMELOG) 7 Unit(s) SubCutaneous three times a day before meals  levothyroxine 50 MICROGram(s) Oral daily  melatonin 6 milliGRAM(s) Oral at bedtime  mycophenolate mofetil 250 milliGRAM(s) Oral <User Schedule>  nystatin    Suspension 710045 Unit(s) Oral four times a day  pantoprazole    Tablet 40 milliGRAM(s) Oral <User Schedule>  petrolatum white Ointment 1 Application(s) Topical two times a day  polyethylene glycol 3350 17 Gram(s) Oral daily  senna 2 Tablet(s) Oral at bedtime  sirolimus 3.5 milliGRAM(s) Oral <User Schedule>  trimethoprim   80 mG/sulfamethoxazole 400 mG 1 Tablet(s) Oral daily  valGANciclovir 450 milliGRAM(s) Oral <User Schedule>    MEDICATIONS  (PRN):  acetaminophen     Tablet .. 650 milliGRAM(s) Oral every 6 hours PRN Mild Pain (1 - 3)  diphenhydrAMINE 25 milliGRAM(s) Oral at bedtime PRN Insomnia      PHYSICAL EXAM  Vital Signs Last 24 Hrs  T(C): 36.2 (15 Aug 2022 13:00), Max: 36.7 (15 Aug 2022 09:00)  T(F): 97.2 (15 Aug 2022 13:00), Max: 98 (15 Aug 2022 09:00)  HR: 67 (15 Aug 2022 13:00) (67 - 81)  BP: 166/71 (15 Aug 2022 13:00) (147/56 - 170/64)  BP(mean): --  RR: 18 (15 Aug 2022 13:00) (18 - 18)  SpO2: 96% (15 Aug 2022 13:00) (95% - 98%)    Parameters below as of 15 Aug 2022 13:00  Patient On (Oxygen Delivery Method): room air        GENERAL: Male laying in bed in NAD  Abdomen: Soft, nontender, non distended  Extremities: Warm, no edema in all 4 exts   NEURO: A&O X3    LABS:                        8.0    13.27 )-----------( 332      ( 15 Aug 2022 05:37 )             25.2     08-15    132<L>  |  93<L>  |  72<H>  ----------------------------<  153<H>  4.3   |  24  |  4.24<H>    Ca    8.5      15 Aug 2022 05:41  Phos  3.1     08-15  Mg     2.0     08-15    TPro  6.2  /  Alb  2.2<L>  /  TBili  0.2  /  DBili  0.1  /  AST  15  /  ALT  14  /  AlkPhos  223<H>  08-15    PT/INR - ( 15 Aug 2022 05:45 )   PT: 13.8 sec;   INR: 1.20 ratio         PTT - ( 15 Aug 2022 05:45 )  PTT:29.7 sec    Thyroid Stimulating Hormone, Serum: 4.69 uIU/mL (07-06 @ 18:36)

## 2022-08-15 NOTE — PROGRESS NOTE ADULT - ASSESSMENT
66M with h/o DM type II, HTN, CAD s/p CABG in 2020, Afib on Eliquis, CVA in 2019 due to Afib, Seizure d/o (last episode was on 4/8/22), h/o GI bleed in 2/2022 EGD with duodenal ulcer and ESRD on HD s/p R DDRT from DCD donor on 4/21/22 complicated by DGF requiring HD until 4/29/22 who presented with status epilepticus in setting of UTI/antibiotics and sub-therapeutic valproic acid level. Transferred to SICU on 7/25 with signs of sepsis, now with refractory seizures    Seizure Disorder:  - Neuro/Epilepsy Teams following. Avoid Fycompa 2/2 agitation in past  - prior MRI head 6/27 with less concerns for PRES, likely ischemic changes  - o/n CTH 8/3 with no acute findings  - replete lytes aggressively  - Continue Fosphenytoin and FU fosphenytoin level daily    R DCD DDRT 4/21/22 c/b 2A ACR  - 2A ACR: s/p pulse steroids. Continue Prednisone 20mg QD  - IR consulted for right psoas muscle/fascia biopsy  - HD/UF daily as per nephrology for fluid overload/anuria  - Scrotal support  - On vanc by level  - Send surveillance BCx today  - graft doppler stable, stable perinephric collection last US 8/5 and CT scan 8/8  - S/P removal of ureteral stent on 7/12  - Continue Doxazosin/Proscar for BPH  - OOB with RN/PT  - DSA negative   - Renal Soft diet as tolerated with PO supplements  - 8/8 Punch biopsy wound Culture: ngtd. Negative for PTLD  - DVT ppx with SQH  - fungitel negative  - LE studies WNL  - Benadryl PRN for sleep    Immunosuppression:   - Belatacept: 6/23-8/1 discontinued  - on Sirolimus as of  8/2 daily based on level, Cellcept 250 BID, Pred 20mg QD  - PPX:  Nystatin, bactrim, valcyte (MWF)    DM  - controlled  - Fingerstick ac and qhs  - Continue Lantus 6u qpm and premeal Humalog with SSI  - Endo following    AFib/R IJ DVT   - Eliquis with ~40% less efficacy while on fosphenytoin.    - Lovenox held for ANA and transaminitis   - rate controlled  - will discuss A/C plans daily    HTN:  - Amlodipine/Doxazosin/Hydralazline  - off Coreg due to bradycardia

## 2022-08-15 NOTE — PROGRESS NOTE ADULT - SUBJECTIVE AND OBJECTIVE BOX
[facility] DIVISION OF KIDNEY DISEASES AND HYPERTENSION   FOLLOW UP NOTE    --------------------------------------------------------------------------------  Chief Complaint: 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 was seen and examined today. Overnight events noted. Pt continues to remains volume overload. Last Hd done on 8/13/22.    PAST HISTORY  --------------------------------------------------------------------------------  No significant changes to PMH, PSH, FHx, SHx, unless otherwise noted    ALLERGIES & MEDICATIONS  --------------------------------------------------------------------------------  Allergies    No Known Allergies    Intolerances      Standing Inpatient Medications  amLODIPine   Tablet 10 milliGRAM(s) Oral daily  chlorhexidine 2% Cloths 1 Application(s) Topical <User Schedule>  doxazosin 4 milliGRAM(s) Oral <User Schedule>  epoetin cortney-epbx (RETACRIT) Injectable 81748 Unit(s) SubCutaneous every 7 days  finasteride 5 milliGRAM(s) Oral daily  fosphenytoin IVPB 200 milliGRAM(s) PE IV Intermittent every 12 hours  heparin   Injectable 5000 Unit(s) SubCutaneous every 8 hours  hydrALAZINE 25 milliGRAM(s) Oral every 8 hours  insulin glargine Injectable (LANTUS) 6 Unit(s) SubCutaneous at bedtime  insulin lispro (ADMELOG) corrective regimen sliding scale   SubCutaneous three times a day before meals  insulin lispro (ADMELOG) corrective regimen sliding scale   SubCutaneous at bedtime  insulin lispro Injectable (ADMELOG) 5 Unit(s) SubCutaneous three times a day before meals  levothyroxine 50 MICROGram(s) Oral daily  melatonin 6 milliGRAM(s) Oral at bedtime  mycophenolate mofetil 250 milliGRAM(s) Oral <User Schedule>  nystatin    Suspension 396749 Unit(s) Oral four times a day  pantoprazole    Tablet 40 milliGRAM(s) Oral <User Schedule>  petrolatum white Ointment 1 Application(s) Topical two times a day  polyethylene glycol 3350 17 Gram(s) Oral daily  predniSONE   Tablet 20 milliGRAM(s) Oral daily  senna 2 Tablet(s) Oral at bedtime  sirolimus 3.5 milliGRAM(s) Oral <User Schedule>  trimethoprim   80 mG/sulfamethoxazole 400 mG 1 Tablet(s) Oral daily  valGANciclovir 450 milliGRAM(s) Oral <User Schedule>    PRN Inpatient Medications  acetaminophen     Tablet .. 650 milliGRAM(s) Oral every 6 hours PRN  diphenhydrAMINE 25 milliGRAM(s) Oral at bedtime PRN      REVIEW OF SYSTEMS  --------------------------------------------------------------------------------  Gen: anasarca+ (improving)  Head/Eyes/Ears: No HA   Respiratory: No dyspnea, cough  CV: No chest pain  GI: No abdominal pain, diarrhea, as per hPI  : No dysuria, hematuria  MSK: LE edema+  Skin: No rashes  Heme: No easy bruising or bleeding      All other systems were reviewed and are negative, except as noted.    VITALS/PHYSICAL EXAM  --------------------------------------------------------------------------------  T(C): 36.7 (08-15-22 @ 09:00), Max: 36.7 (08-15-22 @ 09:00)  HR: 75 (08-15-22 @ 09:00) (67 - 81)  BP: 149/66 (08-15-22 @ 09:00) (137/62 - 170/64)  RR: 18 (08-15-22 @ 09:00) (18 - 18)  SpO2: 95% (08-15-22 @ 09:00) (95% - 98%)  Wt(kg): --      08-14-22 @ 07:01  -  08-15-22 @ 07:00  --------------------------------------------------------  IN: 1240 mL / OUT: 440 mL / NET: 800 mL      Physical Exam:  	Gen: weak appearing elderly male  	HEENT: Anicteric  	Pulm: CTA B/L  	CV: S1S2+  	Abd: Soft, +BS               Transplant site: RLQ non tender, surrounding erythema over the transplant site+,  	Ext: LE edema B/L++  	Neuro: Awake  	Skin: Warm and dry  	Dialysis access: Left non tunneled IJ catheter+, site clear, no bleeding noted      LABS/STUDIES  --------------------------------------------------------------------------------              8.0    13.27 >-----------<  332      [08-15-22 @ 05:37]              25.2     132  |  93  |  72  ----------------------------<  153      [08-15-22 @ 05:41]  4.3   |  24  |  4.24        Ca     8.5     [08-15-22 @ 05:41]      Mg     2.0     [08-15-22 @ 05:41]      Phos  3.1     [08-15-22 @ 05:41]    Creatinine Trend:  SCr 4.24 [08-15 @ 05:41]  SCr 3.17 [08-14 @ 07:00]  SCr 3.72 [08-13 @ 07:06]  SCr 3.54 [08-12 @ 22:07]  SCr 3.14 [08-12 @ 17:17]    Urinalysis - [07-25-22 @ 18:28]      Color Yellow / Appearance Slightly Turbid / SG 1.020 / pH 5.5      Gluc Negative / Ketone Negative  / Bili Negative / Urobili Negative       Blood Small / Protein 100 / Leuk Est Large / Nitrite Negative      RBC 2 / WBC 40 / Hyaline 5 / Gran  / Sq Epi  / Non Sq Epi 0 / Bacteria Many    Tacrolimus  Cyclosporine  SirolimusSirolimus (Rapamycin) Level, Serum: 2.2 ng/mL (08-15 @ 05:47)  Sirolimus (Rapamycin) Level, Serum: 5.0 ng/mL (08-14 @ 07:03)  Sirolimus (Rapamycin) Level, Serum: 4.0 ng/mL (08-13 @ 07:03)  Sirolimus (Rapamycin) Level, Serum: 4.6 ng/mL (08-12 @ 05:06)  Sirolimus (Rapamycin) Level, Serum: 3.6 ng/mL (08-11 @ 05:38)  Sirolimus (Rapamycin) Level, Serum: 3.8 ng/mL (08-10 @ 05:57)  Sirolimus (Rapamycin) Level, Serum: 3.8 ng/mL (08-09 @ 05:52)  Sirolimus (Rapamycin) Level, Serum: 4.0 ng/mL (08-08 @ 05:42)  Sirolimus (Rapamycin) Level, Serum: 3.5 ng/mL (08-07 @ 06:05)  Sirolimus (Rapamycin) Level, Serum: 3.4 ng/mL (08-06 @ 05:37)  Sirolimus (Rapamycin) Level, Serum: 3.4 ng/mL (08-05 @ 04:17)  Sirolimus (Rapamycin) Level, Serum: 5.0 ng/mL (08-03 @ 22:24)  Sirolimus (Rapamycin) Level, Serum: 6.1 ng/mL (08-02 @ 23:18)  Sirolimus (Rapamycin) Level, Serum: 6.7 ng/mL (08-01 @ 23:41)      CMV PCRCMVPCR Log: NotDetec Uuu63WC/mL (07-22 @ 06:18)  CMVPCR Log: NotDetec Tyv25YM/mL (06-26 @ 18:14)  CMVPCR Log: NotDetec Qjf16WK/mL (06-25 @ 14:08)    Parvo PCRParvovirus B19 DNA by PCR: NotDetec IU/mL (07-25 @ 18:32)  Parvovirus B19 DNA by PCR: NotDetec IU/mL (06-26 @ 18:14

## 2022-08-15 NOTE — CONSULT NOTE ADULT - SUBJECTIVE AND OBJECTIVE BOX
History of Present Illness: 66y Male with history of kidney transplant presenting with seizure. Noted on imaging to have psoas hematoma.     Allergies:   No Known Allergies    Medications (Antibiotics/Cardiovascular/Anticoagulants/Blood Products):     amLODIPine   Tablet: 10 milliGRAM(s) Oral (08-15-22 @ 05:12)  doxazosin: 4 milliGRAM(s) Oral (08-15-22 @ 05:11)  heparin   Injectable: 5000 Unit(s) SubCutaneous (08-15-22 @ 05:11)  hydrALAZINE: 25 milliGRAM(s) Oral (08-15-22 @ 05:11)  nystatin    Suspension: 540274 Unit(s) Oral (08-15-22 @ 12:22)  trimethoprim   80 mG/sulfamethoxazole 400 m Tablet(s) Oral (08-15-22 @ :22)  valGANciclovir: 450 milliGRAM(s) Oral (08-15-22 @ 12:21)    Vital Signs:  T(F): 98 (08-15-22 @ 09:00), Max: 98 (08-15-22 @ 09:00)  HR: 75 (08-15-22 @ 09:00) (67 - 81)  BP: 149/66 (08-15-22 @ 09:00) (137/62 - 170/64)  RR: 18 (08-15-22 @ 09:00) (18 - 18)  SpO2: 95% (08-15-22 @ 09:00) (95% - 98%)    Relevant Lab Results:            8.0  13.27)-----(332     (08-15-22 @ 05:37)         25.2     132 | 93 | 72  --------------------< 153     (08-15-22 @ 05:41)  4.3 | 24 | 4.24       PT: 13.8<H> 08-15-22 @ 05:45  aPTT: 29.7 08-15-22 @ 05:45   INR: 1.20<H> 08-15-22 @ 05:45    Imaging: reviewed. R psoas hematoma, anascara, no intramuscular fluid collection.

## 2022-08-15 NOTE — PROGRESS NOTE ADULT - SUBJECTIVE AND OBJECTIVE BOX
INTERVAL HPI/OVERNIGHT EVENTS:    S:  Patient is a 66y Male Patient is a 66y old  Male who presents with a chief complaint of Status Epilepticus (09 Aug 2022 12:24)      **No overnight events.**    Skin is improving. No new skin complaints.      REVIEW OF SYSTEMS      General: no fevers/chills, no NS	    Skin: see HPI  	  Ophthalmologic: no eye pain or change in vision    Genitourinary: no dysuria or hematuria    Musculoskeletal: no joint pains or weakness	    Neurological:no weakness or tingling        ROS negative except if noted in the above subjective text. All other systems reviewed and negative.    MEDICATIONS  (STANDING):  amLODIPine   Tablet 10 milliGRAM(s) Oral daily  chlorhexidine 2% Cloths 1 Application(s) Topical <User Schedule>  doxazosin 4 milliGRAM(s) Oral <User Schedule>  epoetin cortney-epbx (RETACRIT) Injectable 30706 Unit(s) SubCutaneous every 7 days  finasteride 5 milliGRAM(s) Oral daily  fluconAZOLE   Tablet 400 milliGRAM(s) Oral daily  fosphenytoin IVPB 200 milliGRAM(s) PE IV Intermittent every 12 hours  heparin   Injectable 5000 Unit(s) SubCutaneous every 12 hours  hydrALAZINE 25 milliGRAM(s) Oral every 8 hours  insulin glargine Injectable (LANTUS) 12 Unit(s) SubCutaneous at bedtime  insulin lispro (ADMELOG) corrective regimen sliding scale   SubCutaneous three times a day before meals  insulin lispro (ADMELOG) corrective regimen sliding scale   SubCutaneous at bedtime  levothyroxine 50 MICROGram(s) Oral daily  melatonin 6 milliGRAM(s) Oral at bedtime  mycophenolate mofetil 250 milliGRAM(s) Oral <User Schedule>  pantoprazole    Tablet 40 milliGRAM(s) Oral before breakfast  polyethylene glycol 3350 17 Gram(s) Oral daily  predniSONE   Tablet 20 milliGRAM(s) Oral daily  senna 2 Tablet(s) Oral at bedtime  sirolimus 2.5 milliGRAM(s) Oral <User Schedule>  trimethoprim   80 mG/sulfamethoxazole 400 mG 1 Tablet(s) Oral daily  valGANciclovir 450 milliGRAM(s) Oral <User Schedule>    MEDICATIONS  (PRN):  acetaminophen     Tablet .. 650 milliGRAM(s) Oral every 6 hours PRN Mild Pain (1 - 3)      Allergies    No Known Allergies    Intolerances          O: ICU Vital Signs Last 24 Hrs  T(C): 36.8 (09 Aug 2022 14:15), Max: 37.6 (08 Aug 2022 23:00)  T(F): 98.2 (09 Aug 2022 14:15), Max: 99.7 (08 Aug 2022 23:00)  HR: 61 (09 Aug 2022 14:15) (55 - 72)  BP: 160/68 (09 Aug 2022 14:15) (131/59 - 218/94)  BP(mean): 98 (09 Aug 2022 14:15) (85 - 135)  ABP: --  ABP(mean): --  RR: 13 (09 Aug 2022 14:15) (12 - 26)  SpO2: 96% (09 Aug 2022 14:15) (95% - 100%)    O2 Parameters below as of 09 Aug 2022 07:00  Patient On (Oxygen Delivery Method): room air          I&O's Detail    08 Aug 2022 07:01  -  09 Aug 2022 07:00  --------------------------------------------------------  IN:    Insulin: 20.5 mL    IV PiggyBack: 50 mL    Oral Fluid: 952 mL    Other (mL): 500 mL  Total IN: 1522.5 mL    OUT:    Indwelling Catheter - Urethral (mL): 20 mL    Other (mL): 2500 mL  Total OUT: 2520 mL    Total NET: -997.5 mL      09 Aug 2022 07:01  -  09 Aug 2022 14:46  --------------------------------------------------------  IN:    Insulin: 2.5 mL    Oral Fluid: 500 mL    Other (mL): 500 mL  Total IN: 1002.5 mL    OUT:    Other (mL): 3000 mL  Total OUT: 3000 mL    Total NET: -1997.5 mL          PHYSICAL EXAM:     The patient was alert and oriented X 3, well nourished, and in no  apparent distress.  OP showed no ulcerations  There was no visible lymphadenopathy.  Conjunctiva were non injected  There was no clubbing or edema of extremities.  The scalp, hair, face, eyebrows, lips, OP, neck, chest, back,   extremities X 4, nails were examined.  There was no hyperhidrosis or bromhidrosis.    Of note on skin exam:     improved- indurated well-demarcated pink/red plaque erythema on the proximal R thigh, R abdomen  slightly swollen, edematous scrotum and penis    Labs:                       8.7    22.22 )-----------( 321      ( 09 Aug 2022 05:52 )             26.9     CBC Full  -  ( 09 Aug 2022 05:52 )  WBC Count : 22.22 K/uL  RBC Count : 2.90 M/uL  Hemoglobin : 8.7 g/dL  Hematocrit : 26.9 %  Platelet Count - Automated : 321 K/uL  Mean Cell Volume : 92.8 fl  Mean Cell Hemoglobin : 30.0 pg  Mean Cell Hemoglobin Concentration : 32.3 gm/dL  Auto Neutrophil # : 17.99 K/uL  Auto Lymphocyte # : 0.60 K/uL  Auto Monocyte # : 1.66 K/uL  Auto Eosinophil # : 0.14 K/uL  Auto Basophil # : 0.11 K/uL  Auto Neutrophil % : 81.0 %  Auto Lymphocyte % : 2.7 %  Auto Monocyte % : 7.5 %  Auto Eosinophil % : 0.6 %  Auto Basophil % : 0.5 %    08-09    131<L>  |  94<L>  |  51<H>  ----------------------------<  130<H>  4.0   |  23  |  3.43<H>    Ca    8.3<L>      09 Aug 2022 05:54  Phos  3.6     08-09  Mg     2.0     08-09    TPro  5.6<L>  /  Alb  1.9<L>  /  TBili  0.2  /  DBili  0.2  /  AST  16  /  ALT  14  /  AlkPhos  261<H>  08-09    PT/INR - ( 09 Aug 2022 05:52 )   PT: 14.3 sec;   INR: 1.24 ratio         PTT - ( 09 Aug 2022 05:52 )  PTT:31.3 sec    CAPILLARY BLOOD GLUCOSE  269 (08 Aug 2022 13:50)      POCT Blood Glucose.: 193 mg/dL (09 Aug 2022 12:02)

## 2022-08-15 NOTE — PROGRESS NOTE ADULT - ASSESSMENT
Patient is a 66  M with Type 2 DM> unknown control due to skewed A1C 6.6% secondary to chronic anemia and s/p blood tx. On basal/bolus insulin therapy PTA. DM c/b ESRD s/p DDRT on 3/21, CVA, CAD s/p CABG, Afib on apixaban, seizure activity, HTN, HLD, h/o GI bleed in 2/2022 EGD with duodenal ulcer, discharged to [facility] rehab center after prior hospitalization, now re-admitted from [facility] for seizures c/f status epilepticus in setting of UTI. Endocrine consulted for steroid induced hyperglycemia. Prolonged hospital course w/ ICU stay, previously intubated/extubated, previous seizures. S/p ureteral stent removal 7/12/22.       1.  DM  - T2DM   - Most recent Hemoglobin A1C 6.0 unreliable in the setting of anemia  - Current FS ranges from 170-220   - Current inpatient regimen: Glargine 6, Admelog 5, LDCC  - Current diet:CHO  - Please monitor blood glucose values TID AC & QHS while eating regular meals and Q6H while NPO  - Blood glucose goals pre-meal less than 140 mg/dL and random blood glucose less than 180 mg/dL  - Recommendations:  - Fasting at goal continue with insulin Glargine 6 units QHS  - Mildly elevated postprandial glucose, increase insulin Lispro to 7 units TID with meals, hold if NPO or if eating less than 50% of meals  - Continue with low dose correctional scale TID with meals  - Continue with low dose correctional scale QHS    Discharge planning:   - plan to rehab, likely will need basal bolus insulin final doses tbd  Outpatient f/u with Endocrinologist Dr. Lincoln. [address]. Phone  Needs apt at time of discharge  -Needs optho/renal/cardiac f/u as out pt  -Make sure pt has insulin and DM supplies at time of discharge.    2. HTN  - BP goal 130/80  - Manage per primary team     3. Hypothyroidism   - Continue with LT4 50 mcg daily   - Noted med given with other meds including Protonix which inhibits LT4 absorption.   - Please make sure LT4 is given on an empty stomach at least one hour apart from other meds and food to ensure med absorption. DO NOT GIVE WITH VITAMINS/ANTACIDS  - If unable to ensure proper time administration switching to IV dosing in order to ensure med absorption. Synthroid 37.5mcg IV   Monitor TFTs outpatient. TSH can be skewed when critically ill.    Thank you for the consult. Will continue to monitor. Contact via pager or [employer] Teams during business hours. On evenings and weekends, please call [phone] or page endocrine fellow on call. Patient can follow up at discharge with [facility] by calling [phone] to make an appointment.     greater than 50% of the encounter was spent counseling and/or coordination of care.  27 minutes spent on total encounter; The necessity of the time spent during the encounter on this date of service was due to development of plan of care/coordination of care/glycemic control through review of labs, blood glucose values and vital signs.     Josefina Martin MD  Department of Endocrinology, Diabetes and metabolism   Pager [phone]   Patient is a 66  M with Type 2 DM> unknown control due to skewed A1C 6.6% secondary to chronic anemia and s/p blood tx. On basal/bolus insulin therapy PTA. DM c/b ESRD s/p DDRT on 3/21, CVA, CAD s/p CABG, Afib on apixaban, seizure activity, HTN, HLD, h/o GI bleed in 2/2022 EGD with duodenal ulcer, discharged to [facility] rehab center after prior hospitalization, now re-admitted from [facility] for seizures c/f status epilepticus in setting of UTI. Endocrine consulted for steroid induced hyperglycemia. Prolonged hospital course w/ ICU stay, previously intubated/extubated, previous seizures. S/p ureteral stent removal 7/12/22.       1.  DM  - T2DM   - Most recent Hemoglobin A1C 6.0 unreliable in the setting of anemia  - Current FS ranges from 170-220   - Current inpatient regimen: Glargine 6, Admelog 5, LDCC  - Current diet:CHO  - Please monitor blood glucose values TID AC & QHS while eating regular meals and Q6H while NPO  - Blood glucose goals pre-meal less than 140 mg/dL and random blood glucose less than 180 mg/dL  - Recommendations:  - Currently on Prednisone steroid taper 20 mg daily--> 10 mg daily  - Fasting at goal continue with insulin Glargine 6 units QHS  - Mildly elevated postprandial glucose, increase insulin Lispro to 7 units TID with meals, hold if NPO or if eating less than 50% of meals  - Continue with low dose correctional scale TID with meals  - Continue with low dose correctional scale QHS    Discharge planning:   - plan to rehab, likely will need basal bolus insulin final doses tbd  Outpatient f/u with Endocrinologist Dr. Lincoln. [address]. Phone  Needs apt at time of discharge  -Needs optho/renal/cardiac f/u as out pt  -Make sure pt has insulin and DM supplies at time of discharge.    2. HTN  - BP goal 130/80  - Manage per primary team     3. Hypothyroidism   - Continue with LT4 50 mcg daily   - Noted med given with other meds including Protonix which inhibits LT4 absorption.   - Please make sure LT4 is given on an empty stomach at least one hour apart from other meds and food to ensure med absorption. DO NOT GIVE WITH VITAMINS/ANTACIDS  - If unable to ensure proper time administration switching to IV dosing in order to ensure med absorption. Synthroid 37.5mcg IV   Monitor TFTs outpatient. TSH can be skewed when critically ill.    Thank you for the consult. Will continue to monitor. Contact via pager or Microsoft Teams during business hours. On evenings and weekends, please call [phone] or page endocrine fellow on call. Patient can follow up at discharge with [facility] by calling [phone] to make an appointment.     greater than 50% of the encounter was spent counseling and/or coordination of care.  27 minutes spent on total encounter; The necessity of the time spent during the encounter on this date of service was due to development of plan of care/coordination of care/glycemic control through review of labs, blood glucose values and vital signs.     Josefina Martin MD  Department of Endocrinology, Diabetes and metabolism   Pager [phone]

## 2022-08-15 NOTE — PROGRESS NOTE ADULT - ASSESSMENT
66M with ESRD s/p PermCath removal 5/10/22 s/p Rt DDRT 4/21/22,  CVA (2019), Afib on apixaban, seizure activity, CAD s/p stents, CABG (2020), HTN, HLD, DM on insulin, gastric/duodenal ulcer, recent hospitalization 4/28/22 -5/10/22 with weakness/anemia found to have perinephric hematoma requiring evacuation and repair of bleeding arterial anastomosis admitted 6/10 for status epilepticus requiring intubation for hypoxic respiratory failure.     s/p LP which was not suggestive of infectious process  U/A (7/12) 161 WBC  UCx (7/13) 50-99K ESBL Klebsiella   s/p 3 days of Ertapenem  UA (7/25) with 40 WBC  UCx (7/25) 50-99k ESBL Klebsiella     RUQ (7/25) with hepatomegaly   CT c/a/p (7/25) with only small perinephric fluid collection, small volume ascites.  Doppler US R Kidney (7/27) with mild thickening of collecting system and ureter and small   Doppler US R Kidney (7/29) with fullness of collecting system and continued urothelial thickening.     RVP (7/25) Negative  CMV PCR (7/22) Negative  EBV PCR (7/25) Negative   HBV PCR (7/25) Negative   HHV-6 PCR (7/25) POSITIVE   Parvovirus IgM and PCR (7/26) Negative   HSV 1/2 PCR (7/26) Negative   Adenovirus PCR (7/26) Negative    CT A/P (8/5) with No drainable fluid collections and Right psoas retroperitoneal hematoma.    Antibiotic Course:  Vancomycin: 6/11, 6/26 --> 6/29, 7/25, 8/4 -->   Ertapenem: 6/14 --> 6/18, 6/22 --> 6/23, 7/15 --> 7/17, 7/28 --> 8/3  Meropenem: 6/26 --> 6/29, 7/25 --> 7/28  Fluconazole: 6/11 --> 6/21, 6/26 --> 7/6, 7/26 --> 8/10    #Rash, Erythema overlying Renal Transplant, Leukocytosis, Fever  Developed after 10 days of Carbapenem Therapy  No significant improvement after Vancomycin initiation  Concern for atypical infectious etiology (or noninfectious process)  Dermatology Surgical Path (8/8) nonspecific findings including edema, telangiectasia and sparse mixed infiltrate of lymphocytes and neutrophils, culture prelim negative, AFB negative.   Findings are nonspecific ?Cellulitis  --Can continue Vancomycin by level (8/4 -->); would limit duration to 10-14 days total  --Agree with Dermatology recommendation; would see if we can pursue biopsy of abdominal wall muscle for additional culture yield  --Follow up on Dermatology Bacterial, Fungal and AFB Cultures    #Positive Blood Culture  BCx (8/8) with Coagulase negative staph 1 out of 4   BCx (8/9) NGTD  Suspect Coagulase negative staph is a contaminant  --Follow up on prelim cultures    #ESRD s/p DDRT (4/21/2022) (CMV +/+, EBV +/+)  --Continue Bactrim SS PO Q24H for PCP PPx  --Continue Valcyte 450 mg PO Mon/Wed/Fri for CMV PPx    I will continue to follow. Please feel free to contact me with any further questions.    Carlton Hoover M.D.  [facility] Division of Infectious Disease  8AM-5PM Monday - Friday: Available on [employer] Teams  After Hours and Holidays (or if no response on [employer] Teams): Please contact the Infectious Diseases Office at [phone]    The above assessment and plan were discussed with Dr Eduardo Barber

## 2022-08-15 NOTE — CONSULT NOTE ADULT - ASSESSMENT
Assessment: 66y Male with history of kidney transplant presenting with seizure. Noted on imaging to have psoas hematoma, reported intramuscular fluid on imaging.    Plan:  -No fluid collection or mass amenable to percutaneous IR biopsy/drainage    --  Ant Galaviz MD  Radiology Resident PGY-4  Office: [phone]  IR pager for urgent needs [phone]

## 2022-08-15 NOTE — PROGRESS NOTE ADULT - ASSESSMENT
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 In dermatology there are reports of privileged localization of cutaneous lesions on injured skin which, therefore, represents a typical condition  - Erythema is surrounding previous transplant incision site  - Favor an infectious etiology, in the setting fo immunosuppression -- patient has already been covered on broad spectrum abx  - Currently on Vanc and fluconazole  - H&E showed non-specific findings that could be c/w cellulitis  - F/u tissue culture for bacterial/fungal/AFB - NGTD  - Continue using scrotal edema sling to help with the scrotal edema    The patient's chart was reviewed in addition to being seen and examined at bedside with the dermatology attending.  Recommendations were communicated with the primary team.  Please page [phone] with a 10-digit call-back number for further related questions.    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 In dermatology there are reports of privileged localization of cutaneous lesions on injured skin which, therefore, represents a typical condition  - Erythema is surrounding previous transplant incision site  - Favor an infectious etiology, in the setting fo immunosuppression -- patient has already been covered on broad spectrum abx  - Currently on Vancomycin  - H&E showed non-specific findings that could be c/w cellulitis  - F/u tissue culture for bacterial/fungal/AFB - NGTD  - Continue using scrotal edema sling to help with the scrotal edema    The patient's chart was reviewed in addition to being seen and examined at bedside with the dermatology attending.  Recommendations were communicated with the primary team.  Please page [phone] with a 10-digit call-back number for further related questions.    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 In dermatology there are reports of privileged localization of cutaneous lesions on injured skin which, therefore, represents a typical condition  - Erythema is surrounding previous transplant incision site  - Favor an infectious etiology, in the setting fo immunosuppression -- patient has already been covered on broad spectrum abx  - Currently on Vancomycin  - H&E showed non-specific findings that could be c/w cellulitis  - F/u tissue culture for bacterial/fungal/AFB - NGTD  - Continue using scrotal edema sling to help with the scrotal edema    Thank you for allowing us to participate in the care of this patient.  Please re-consult Dermatology for any new or worsening developments.    The patient's chart was reviewed in addition to being seen and examined at bedside with the dermatology attending.  Recommendations were communicated with the primary team.  Please page [phone] with a 10-digit call-back number for further related questions.    Heri Guan MD  Resident Physician, PGY-3  [facility] Dermatology  Pager: [phone]  Office: [phone]

## 2022-08-15 NOTE — PROGRESS NOTE ADULT - ASSESSMENT
66 yr old Male with PMHx ESRD s/p permacath removal 5/10/22 s/p Rt DDRT 4/21/22,  CVA (2019), Afib on apixaban, seizure activity, CAD s/p stents, CABG (2020), HTN, HLD, DM on insulin, gastric/duodenal ulcer, recent hospitalization 4/28/22 -5/10/22 with weakness/anemia found to have perinephric hematoma requiring evacuation and repair of bleeding arterial anastomosis. Curently being treated for UTI with Levaquin Today after developing multiple sz episodes refractory to 5 mg versed IM (EMS) and 2 mg ativan IV in E.D. concerning for status epilepticus requiring intubation for hypoxic respiratory  failure. MICU Consult was called for hypoxic respiratory failure secondary to status epilepticus.  Nephrology consulted for renal failure.     A/P  DDRT on 04/21/22  Course complicated by DGF, was on HD until 4/29/22. Readmitted in May for anemia in the setting of large perinephric hematoma s/p evacuation and repair of arterial anastomosis on 5/02/22. Intra operative biopsy showed no rejection.  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   Now with failed allograft function, Remains dialysis dependent.   continue Immunosuppression per transplant team  s/p HD with UF 8/12 -Planned for HD today- RRT per transplant team    transplant team on board   monitor BMP, U/O     HTN   acceptable  BP manage by transplant team    monitor BP closely

## 2022-08-16 NOTE — PROGRESS NOTE ADULT - ASSESSMENT
67yo man with PMH of CVA (2019) with subsequent seizure disorder, ESRD s/p renal transplant (04/2022), afib (on apixaban), CAD (s/p stents), CABG (2020), HTN, HLD, DM presents to the ED with status epilepticus from [facility]. Semiology includes eyes deviated to the R, rhythmic R facial twitching as well as R shoulder clonic movements lasting approximately 30 seconds each. EEG from 04/2022 showed L frontocentral focal onset seizures. S/p intubation on 6/11. EEG negative for seizure but showed structural or functional abnormality in the left fronto-temporal region and moderate to severe nonspecific diffuse or multifocal cerebral dysfunction. RRT called 6/21 for decreased movement of RUE but with increased tone and tremor like activity/ clonus and diffuse increased tone and increased encephalopathy concerning for seizure. Course then complicated by EEG 6/24 showing L frontotemporal/ frontal seizures. Was on keppra, valproic acid, vimpat but had seizures through the prior two medications. Stopped vimpat, valproic acid, standing ativan, and fycompa. Stopped fycompa as patient became agitated, aggressive. Was only on phenytoin until recalled on evening of 8/3 into AM of 8/4 for concern of seizure. Now improved. Currently on fosphenytoin.    EEG 8/4:   2 Focal motor seizures originating from the left posterior temporal region with frontal evolution  Mild focal left centroparietal slowing   Mild generalized background slowing    EEG 8/5:   Clinical Impression:  Left hemispheric dysfunction  Mild diffuse cerebral dysfunction, not specific as to etiology.  No seizures since 8/4/22 5AM     Impression: History of recent status epilepticus that was refractory, was stable on phenytoin. Post HD and hypertensive with concerns of systemic inflammatory process (elevated leukocytosis patient found with recurrent seizures. Mental status improved possibly from hospital-induced delirium with toxic-metabolic encephalopathy and infectious encephalopathy.     Recommendations:  [ ] Continue fosphenytoin 200mg q12h, if able to tolerate PO can consider switching to PO phenytoin 200mg BID.   [ ] Given brief period of perseverance while examining, recommend repeat video EEG x24 hrs   [x] CSF without evidence of infection, inflammation  [x] Vitamin B12 1157, folate >20, homocysteine 13.4, Vitamin B1 160  [x] TSH 4.6, T3 3.9/T4 43 - correction of hypothyroidism per primary team and endocrinology  [x] BP goals of normotension   [x] MR brain without clear evidence of infection, inflammation, or acute CNS event (possibly minimal enhancement). Although prior study read as possible PRES, most recent MRI seems more consistent with significant chronic ischemic microvascular disease given no associated DWI changes or obvious enhancement  [x] Telemetry monitoring  [x] Neuro checks and vital signs per unit protocol  [x] Seizure/fall/aspiration precautions  [ ] please have adequate sleeping environment for the patient, light on, curtains open, TV on during the day with regular stimulation and out of bed to chair if possible. During the night, close curtains, TV off, lights off, and minimize interruptions (limit blood draws and vital sign checks if possible). Regular interaction with patient with staff (introducing selves), frequent reorientation, and encouragement of visitors as allowed by hospital and unit policy. Regular toileting.  [/] Advise against driving, operating heavy machinery, water sports/bathing, activity in elevation without appropriate safety precautions  [ ] outpatient EMG/ NCS    Patient seen and discussed with neurology attending, Dr. Maria.

## 2022-08-16 NOTE — PROGRESS NOTE ADULT - ASSESSMENT
66 year old male with PMH of  DM type II, HTN, CAD s/p CABG in 2020, Afib on Eliquis, CVA in 2019 due to Afib, Seizure d/o (last episode was on 4/8/22), h/o GI bleed in 2/2022 EGD with duodenal ulcer and ESRD on HD s/p R DDRT from [facility] donor on 4/21/22 complicated by DGF requiring HD until 4/29/22, later had good graft function who was readmitted with status epilepticus in setting of UTI/antibiotics and sub-therapeutic valproic acid level.  Transferred to SICU on 7/25 with signs of sepsis. 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 Received pulse steroids (Solumedrol 500 on 7/30 -> 250 on 7/31).

## 2022-08-16 NOTE — CONSULT NOTE ADULT - TIME BILLING
high complexity of this case on the discharge service for the patient. I have reviewed and made amendments to the documentation where necessary.

## 2022-08-16 NOTE — PROGRESS NOTE ADULT - SUBJECTIVE AND OBJECTIVE BOX
[facility] DIVISION OF KIDNEY DISEASES AND HYPERTENSION -- FOLLOW UP NOTE  --------------------------------------------------------------------------------  Chief Complaint: 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 was seen and examined today. Overnight events noted. Pt reports feeling better. Last Hd done on 8/15/22.    PAST HISTORY  --------------------------------------------------------------------------------  No significant changes to PMH, PSH, FHx, SHx, unless otherwise noted    ALLERGIES & MEDICATIONS  --------------------------------------------------------------------------------  Allergies    No Known Allergies    Intolerances    Standing Inpatient Medications  amLODIPine   Tablet 10 milliGRAM(s) Oral daily  chlorhexidine 2% Cloths 1 Application(s) Topical <User Schedule>  doxazosin 4 milliGRAM(s) Oral <User Schedule>  epoetin cortney-epbx (RETACRIT) Injectable 36772 Unit(s) SubCutaneous every 7 days  finasteride 5 milliGRAM(s) Oral daily  fosphenytoin IVPB 200 milliGRAM(s) PE IV Intermittent every 12 hours  hydrALAZINE 25 milliGRAM(s) Oral every 8 hours  insulin glargine Injectable (LANTUS) 6 Unit(s) SubCutaneous at bedtime  insulin lispro (ADMELOG) corrective regimen sliding scale   SubCutaneous three times a day before meals  insulin lispro (ADMELOG) corrective regimen sliding scale   SubCutaneous at bedtime  insulin lispro Injectable (ADMELOG) 7 Unit(s) SubCutaneous three times a day before meals  levothyroxine 50 MICROGram(s) Oral daily  melatonin 6 milliGRAM(s) Oral at bedtime  mycophenolate mofetil 250 milliGRAM(s) Oral <User Schedule>  nystatin    Suspension 651529 Unit(s) Oral four times a day  pantoprazole    Tablet 40 milliGRAM(s) Oral <User Schedule>  petrolatum white Ointment 1 Application(s) Topical two times a day  polyethylene glycol 3350 17 Gram(s) Oral daily  predniSONE   Tablet 10 milliGRAM(s) Oral daily  senna 2 Tablet(s) Oral at bedtime  sirolimus 4 milliGRAM(s) Oral <User Schedule>  trimethoprim   80 mG/sulfamethoxazole 400 mG 1 Tablet(s) Oral daily  valGANciclovir 450 milliGRAM(s) Oral <User Schedule>    PRN Inpatient Medications  acetaminophen     Tablet .. 650 milliGRAM(s) Oral every 6 hours PRN  diphenhydrAMINE 25 milliGRAM(s) Oral at bedtime PRN    REVIEW OF SYSTEMS  --------------------------------------------------------------------------------  Gen: No fevers/chills  Respiratory: No dyspnea, cough,   CV: No chest pain, PND, orthopnea  GI: No abdominal pain, diarrhea, constipation, nausea, vomiting  Transplant: No pain  : No increased frequency, dysuria, hematuria   MSK: B/L LE edema +  Neuro: No dizziness/lightheadedness    All other systems were reviewed and are negative, except as noted.    VITALS/PHYSICAL EXAM  --------------------------------------------------------------------------------  T(C): 37.2 (08-16-22 @ 09:00), Max: 37.2 (08-16-22 @ 09:00)  HR: 78 (08-16-22 @ 09:00) (65 - 86)  BP: 168/76 (08-16-22 @ 09:00) (115/67 - 185/82)  RR: 18 (08-16-22 @ 09:00) (18 - 18)  SpO2: 99% (08-16-22 @ 09:00) (95% - 99%)  Wt(kg): --    08-15-22 @ 07:01  -  08-16-22 @ 07:00  --------------------------------------------------------  IN: 550 mL / OUT: 3000 mL / NET: -2450 mL    Physical Exam:  	Gen: weak appearing elderly male  	HEENT: Anicteric  	Pulm: CTA B/L  	CV: S1S2+  	Abd: Soft, +BS               Transplant site: RLQ non tender, surrounding erythema over the transplant site+,  	Ext: LE edema B/L+  	Neuro: Awake  	Skin: Warm and dry  	Dialysis access: Left non tunneled IJ catheter+, site clear, no bleeding noted    LABS/STUDIES  --------------------------------------------------------------------------------              8.1    12.95 >-----------<  331      [08-16-22 @ 07:05]              26.8     133  |  93  |  57  ----------------------------<  214      [08-16-22 @ 07:31]  3.5   |  24  |  3.59        Ca     8.9     [08-16-22 @ 07:31]      Mg     2.2     [08-16-22 @ 07:31]      Phos  2.8     [08-16-22 @ 07:31]    TPro  6.6  /  Alb  2.6  /  TBili  0.3  /  DBili  0.2  /  AST  17  /  ALT  16  /  AlkPhos  260  [08-16-22 @ 07:05]    PT/INR: PT 13.8 , INR 1.20       [08-16-22 @ 07:06]  PTT: 30.1       [08-16-22 @ 07:06]    Creatinine Trend:  SCr 3.59 [08-16 @ 07:31]  SCr 4.24 [08-15 @ 05:41]  SCr 3.17 [08-14 @ 07:00]  SCr 3.72 [08-13 @ 07:06]  SCr 3.54 [08-12 @ 22:07]    Sirolimus (Rapamycin) Level, Serum: 2.2 ng/mL (08-15 @ 05:47)  Sirolimus (Rapamycin) Level, Serum: 5.0 ng/mL (08-14 @ 07:03)  Sirolimus (Rapamycin) Level, Serum: 4.0 ng/mL (08-13 @ 07:03)  Sirolimus (Rapamycin) Level, Serum: 4.6 ng/mL (08-12 @ 05:06)    Urinalysis - [07-25-22 @ 18:28]      Color Yellow / Appearance Slightly Turbid / SG 1.020 / pH 5.5      Gluc Negative / Ketone Negative  / Bili Negative / Urobili Negative       Blood Small / Protein 100 / Leuk Est Large / Nitrite Negative      RBC 2 / WBC 40 / Hyaline 5 / Gran  / Sq Epi  / Non Sq Epi 0 / Bacteria Many    Iron 17, TIBC 171, %sat 10      [05-02-22 @ 13:03]  Ferritin 6336      [07-27-22 @ 13:00]  PTH -- (Ca 9.2)      [02-05-22 @ 10:13]   294  HbA1c 6.0      [02-21-20 @ 08:53]  TSH 4.69      [07-06-22 @ 18:36]  Lipid: chol --, , HDL --, LDL --      [07-27-22 @ 13:00]    HBsAg Nonreact      [07-25-22 @ 11:51]  HCV 0.18, Nonreact      [07-25-22 @ 11:51]

## 2022-08-16 NOTE — PROGRESS NOTE ADULT - SUBJECTIVE AND OBJECTIVE BOX
HPI:      Patient is a 66  M with Type 2 DM> unknown control due to skewed A1C 6.6% secondary to chronic anemia and s/p blood tx. On basal/bolus insulin therapy PTA. DM c/b ESRD s/p DDRT on 3/21, CVA, CAD s/p CABG, Afib on apixaban, seizure activity, HTN, HLD, h/o GI bleed in 2/2022 EGD with duodenal ulcer, discharged to [facility] rehab center after prior hospitalization, now re-admitted from [facility] for seizures c/f status epilepticus in setting of UTI. Endocrine consulted for steroid induced hyperglycemia. Prolonged hospital course w/ ICU stay, previously intubated/extubated, previous seizures. S/p ureteral stent removal 7/12/22.        Home diabetes medications:   -  Semglee 12 units qhs, humalog 5 units TID pre-meal.    Inpatient diabetes medications:   Glargine 6 units QHS  - Admelog 7 TIDAC  - LDCC     Most recent HbA1C 6.0 not reliable due to anemia and ESRD       INTERVAL HPI/OVERNIGHT EVENTS:  On Prednisone 10 mg starting today. Currently denies polydipsia, polyuria , visual changes, numbness in feet. Denies chest pain, nausea or vomiting. Patient did not get his premeal insulin for breakfast this morning because he was going to get muscle biopsy, now canceled and he is back on his full diet.       Review of systems:   CONSTITUTIONAL:  Feels well, good appetite  CARDIOVASCULAR:  Negative for chest pain or palpitations  RESPIRATORY:  Negative for cough, or SOB   GASTROINTESTINAL:  Negative for nausea, vomiting, or abdominal pain  GENITOURINARY:  Negative frequency, urgency or dysuria     CAPILLARY BLOOD GLUCOSE      POCT Blood Glucose.: 315 mg/dL (16 Aug 2022 12:39)  POCT Blood Glucose.: 222 mg/dL (16 Aug 2022 08:23)  POCT Blood Glucose.: 208 mg/dL (16 Aug 2022 06:56)  POCT Blood Glucose.: 205 mg/dL (15 Aug 2022 21:47)  POCT Blood Glucose.: 183 mg/dL (15 Aug 2022 17:20)         MEDICATIONS  (STANDING):  amLODIPine   Tablet 10 milliGRAM(s) Oral daily  chlorhexidine 2% Cloths 1 Application(s) Topical <User Schedule>  doxazosin 4 milliGRAM(s) Oral <User Schedule>  epoetin cortney-epbx (RETACRIT) Injectable 60955 Unit(s) SubCutaneous every 7 days  finasteride 5 milliGRAM(s) Oral daily  fosphenytoin IVPB 200 milliGRAM(s) PE IV Intermittent every 12 hours  heparin   Injectable 5000 Unit(s) SubCutaneous every 8 hours  hydrALAZINE 25 milliGRAM(s) Oral every 8 hours  insulin glargine Injectable (LANTUS) 6 Unit(s) SubCutaneous at bedtime  insulin lispro (ADMELOG) corrective regimen sliding scale   SubCutaneous three times a day before meals  insulin lispro (ADMELOG) corrective regimen sliding scale   SubCutaneous at bedtime  insulin lispro Injectable (ADMELOG) 7 Unit(s) SubCutaneous three times a day before meals  levothyroxine 50 MICROGram(s) Oral daily  melatonin 6 milliGRAM(s) Oral at bedtime  mycophenolate mofetil 250 milliGRAM(s) Oral <User Schedule>  nystatin    Suspension 234043 Unit(s) Oral four times a day  pantoprazole    Tablet 40 milliGRAM(s) Oral <User Schedule>  petrolatum white Ointment 1 Application(s) Topical two times a day  polyethylene glycol 3350 17 Gram(s) Oral daily  senna 2 Tablet(s) Oral at bedtime  sirolimus 3.5 milliGRAM(s) Oral <User Schedule>  trimethoprim   80 mG/sulfamethoxazole 400 mG 1 Tablet(s) Oral daily  valGANciclovir 450 milliGRAM(s) Oral <User Schedule>    MEDICATIONS  (PRN):  acetaminophen     Tablet .. 650 milliGRAM(s) Oral every 6 hours PRN Mild Pain (1 - 3)  diphenhydrAMINE 25 milliGRAM(s) Oral at bedtime PRN Insomnia      PHYSICAL EXAM   Vital Signs Last 24 Hrs  T(C): 37.2 (16 Aug 2022 09:00), Max: 37.2 (16 Aug 2022 09:00)  T(F): 98.9 (16 Aug 2022 09:00), Max: 98.9 (16 Aug 2022 09:00)  HR: 78 (16 Aug 2022 09:00) (65 - 86)  BP: 168/76 (16 Aug 2022 09:00) (115/67 - 185/82)  BP(mean): 118 (16 Aug 2022 06:35) (97 - 118)  RR: 18 (16 Aug 2022 09:00) (18 - 18)  SpO2: 99% (16 Aug 2022 09:00) (95% - 99%)    Parameters below as of 16 Aug 2022 09:00  Patient On (Oxygen Delivery Method): room air            GENERAL: Male laying in bed in NAD  Abdomen: Soft, nontender, non distended  Extremities: Warm, no edema in all 4 exts   NEURO: A&O X3    LABS:            08-16    133<L>  |  93<L>  |  57<H>  ----------------------------<  214<H>  3.5   |  24  |  3.59<H>    Ca    8.9      16 Aug 2022 07:31  Phos  2.8     08-16  Mg     2.2     08-16    TPro  6.6  /  Alb  2.6<L>  /  TBili  0.3  /  DBili  0.2  /  AST  17  /  ALT  16  /  AlkPhos  260<H>  08-16      PT/INR - ( 15 Aug 2022 05:45 )   PT: 13.8 sec;   INR: 1.20 ratio         PTT - ( 15 Aug 2022 05:45 )  PTT:29.7 sec    Thyroid Stimulating Hormone, Serum: 4.69 uIU/mL (07-06 @ 18:36)

## 2022-08-16 NOTE — PROGRESS NOTE ADULT - ASSESSMENT

## 2022-08-16 NOTE — PROGRESS NOTE ADULT - SUBJECTIVE AND OBJECTIVE BOX
Neurology Progress Note    SUBJECTIVE/OBJECTIVE/INTERVAL EVENTS: Patient seen and examined at bedside w/ neuro attending and team. He was more awake and alert than prior days and able to answer most questions. However, he briefly for few seconds had period of perseverance of the current month that improved.     VITALS & EXAMINATION:  Vital Signs Last 24 Hrs  T(C): 37.2 (16 Aug 2022 09:00), Max: 37.2 (16 Aug 2022 09:00)  T(F): 98.9 (16 Aug 2022 09:00), Max: 98.9 (16 Aug 2022 09:00)  HR: 78 (16 Aug 2022 09:00) (65 - 86)  BP: 168/76 (16 Aug 2022 09:00) (115/67 - 185/82)  BP(mean): 118 (16 Aug 2022 06:35) (97 - 118)  RR: 18 (16 Aug 2022 09:00) (18 - 18)  SpO2: 99% (16 Aug 2022 09:00) (95% - 99%)    Parameters below as of 16 Aug 2022 09:00  Patient On (Oxygen Delivery Method): room air    General appearance: does not appear to be in pain  Head: normocephalic  Eyes: clear sclera  Respiratory: breathing regularly    Focused neurologic exam:  MS - awake, alert, oriented to person, place partial date. Follows simple commands. Attend to stimuli from both sides.   CN - pupils equal round, EOMI, BTT b/l, mild R facial droop, cannot assess CNV, hearing intact to conversation b/l, shoulder shrug limited evaluation   Motor - Normal bulk. Inc tone in L side and normal tone in R side. Spontaneous movements of L > R side and at least antigravity. Proximal str LUE 4+, RUE 4, LLE 4-, RLE 3    Sens - LT/temp intact all, as above  Toes downgoing bilaterally   Gait and station - PERRY    LABORATORY:  CBC                       8.1    12.95 )-----------( 331      ( 16 Aug 2022 07:05 )             26.8     Chem 08-16    133<L>  |  93<L>  |  57<H>  ----------------------------<  214<H>  3.5   |  24  |  3.59<H>    Ca    8.9      16 Aug 2022 07:31  Phos  2.8     08-16  Mg     2.2     08-16    TPro  6.6  /  Alb  2.6<L>  /  TBili  0.3  /  DBili  0.2  /  AST  17  /  ALT  16  /  AlkPhos  260<H>  08-16    LFTs LIVER FUNCTIONS - ( 16 Aug 2022 07:05 )  Alb: 2.6 g/dL / Pro: 6.6 g/dL / ALK PHOS: 260 U/L / ALT: 16 U/L / AST: 17 U/L / GGT: x           Coagulopathy PT/INR - ( 16 Aug 2022 07:06 )   PT: 13.8 sec;   INR: 1.20 ratio         PTT - ( 16 Aug 2022 07:06 )  PTT:30.1 sec  Lipid Panel 07-27 Chol -- LDL -- HDL -- Trig 118  A1c   Cardiac enzymes     U/A   CSF  Immunological  Other    STUDIES & IMAGING: (EEG, CT, MR, U/S, TTE/DINAH): Neurology Progress Note    SUBJECTIVE/OBJECTIVE/INTERVAL EVENTS: Patient seen and examined at bedside w/ neuro attending and team. He was more awake and alert than prior days and able to answer most questions. However, he briefly for few seconds had period of perseverance of the current month that improved.    FHx: Non-contributory    ROS: All systems negative except as documented in Interval History    VITALS & EXAMINATION:  Vital Signs Last 24 Hrs  T(C): 37.2 (16 Aug 2022 09:00), Max: 37.2 (16 Aug 2022 09:00)  T(F): 98.9 (16 Aug 2022 09:00), Max: 98.9 (16 Aug 2022 09:00)  HR: 78 (16 Aug 2022 09:00) (65 - 86)  BP: 168/76 (16 Aug 2022 09:00) (115/67 - 185/82)  BP(mean): 118 (16 Aug 2022 06:35) (97 - 118)  RR: 18 (16 Aug 2022 09:00) (18 - 18)  SpO2: 99% (16 Aug 2022 09:00) (95% - 99%)    Parameters below as of 16 Aug 2022 09:00  Patient On (Oxygen Delivery Method): room air    General appearance: does not appear to be in pain  Head: normocephalic  Eyes: clear sclera  Respiratory: breathing regularly    Focused neurologic exam:  MS - awake, alert, oriented to person, place partial date. Follows simple commands. Attend to stimuli from both sides.   CN - pupils equal round, EOMI, BTT b/l, mild R facial droop, cannot assess CNV, hearing intact to conversation b/l, shoulder shrug limited evaluation   Motor - Normal bulk. Inc tone in L side and normal tone in R side. Spontaneous movements of L > R side and at least antigravity. Proximal str LUE 4+, RUE 4, LLE 4-, RLE 3    Sens - LT/temp intact all, as above  Toes downgoing bilaterally   Gait and station - PERRY    LABORATORY:  CBC                       8.1    12.95 )-----------( 331      ( 16 Aug 2022 07:05 )             26.8     Chem 08-16    133<L>  |  93<L>  |  57<H>  ----------------------------<  214<H>  3.5   |  24  |  3.59<H>    Ca    8.9      16 Aug 2022 07:31  Phos  2.8     08-16  Mg     2.2     08-16    TPro  6.6  /  Alb  2.6<L>  /  TBili  0.3  /  DBili  0.2  /  AST  17  /  ALT  16  /  AlkPhos  260<H>  08-16    LFTs LIVER FUNCTIONS - ( 16 Aug 2022 07:05 )  Alb: 2.6 g/dL / Pro: 6.6 g/dL / ALK PHOS: 260 U/L / ALT: 16 U/L / AST: 17 U/L / GGT: x           Coagulopathy PT/INR - ( 16 Aug 2022 07:06 )   PT: 13.8 sec;   INR: 1.20 ratio         PTT - ( 16 Aug 2022 07:06 )  PTT:30.1 sec  Lipid Panel 07-27 Chol -- LDL -- HDL -- Trig 118  A1c   Cardiac enzymes     U/A   CSF  Immunological  Other    STUDIES & IMAGING: (EEG, CT, MR, U/S, TTE/DINAH):

## 2022-08-16 NOTE — PROGRESS NOTE ADULT - PROBLEM SELECTOR PLAN 5
Pt with B/L LE/UE swelling R>L, doppler studies negative of DVT. Continue Subq Heparin. Cannot use Eliquis given interaction with Dilantin. Edema greatly improving with fluid removal. Last HD done on 8/15/22.

## 2022-08-16 NOTE — PROGRESS NOTE ADULT - SUBJECTIVE AND OBJECTIVE BOX
[facility] [place] [facility]   MD NINA MASON MD, PA KRISTINE SOLTANPOUR, DO INJUNG KO, NP    TEL:  OFFICE: [phone]    From 5pm-7am Answering Service [phone]    -- RENAL FOLLOW UP NOTE ---Date of Service 08-16-22 @ 12:39    Patient is a 66y old  Male who presents with a chief complaint of Status Epilepticus (16 Aug 2022 11:22)      Patient seen and examined at bedside. No chest pain/sob    VITALS:  T(F): 98.9 (08-16-22 @ 09:00), Max: 98.9 (08-16-22 @ 09:00)  HR: 78 (08-16-22 @ 09:00)  BP: 168/76 (08-16-22 @ 09:00)  RR: 18 (08-16-22 @ 09:00)  SpO2: 99% (08-16-22 @ 09:00)  Wt(kg): --    08-15 @ 07:01  -  08-16 @ 07:00  --------------------------------------------------------  IN: 550 mL / OUT: 3000 mL / NET: -2450 mL          PHYSICAL EXAM:  Constitutional: NAD  Neck: No JVD  Respiratory: CTAB, no wheezes, rales or rhonchi  Cardiovascular: S1, S2, RRR  Gastrointestinal: BS+, soft, NT/ND  Extremities: No peripheral edema    Hospital Medications:   MEDICATIONS  (STANDING):  amLODIPine   Tablet 10 milliGRAM(s) Oral daily  chlorhexidine 2% Cloths 1 Application(s) Topical <User Schedule>  doxazosin 4 milliGRAM(s) Oral <User Schedule>  epoetin cortney-epbx (RETACRIT) Injectable 48663 Unit(s) SubCutaneous every 7 days  finasteride 5 milliGRAM(s) Oral daily  fosphenytoin IVPB 200 milliGRAM(s) PE IV Intermittent every 12 hours  hydrALAZINE 25 milliGRAM(s) Oral every 8 hours  insulin glargine Injectable (LANTUS) 6 Unit(s) SubCutaneous at bedtime  insulin lispro (ADMELOG) corrective regimen sliding scale   SubCutaneous three times a day before meals  insulin lispro (ADMELOG) corrective regimen sliding scale   SubCutaneous at bedtime  insulin lispro Injectable (ADMELOG) 7 Unit(s) SubCutaneous three times a day before meals  levothyroxine 50 MICROGram(s) Oral daily  melatonin 6 milliGRAM(s) Oral at bedtime  mycophenolate mofetil 250 milliGRAM(s) Oral <User Schedule>  nystatin    Suspension 235555 Unit(s) Oral four times a day  pantoprazole    Tablet 40 milliGRAM(s) Oral <User Schedule>  petrolatum white Ointment 1 Application(s) Topical two times a day  polyethylene glycol 3350 17 Gram(s) Oral daily  predniSONE   Tablet 10 milliGRAM(s) Oral daily  senna 2 Tablet(s) Oral at bedtime  sirolimus 4 milliGRAM(s) Oral <User Schedule>  trimethoprim   80 mG/sulfamethoxazole 400 mG 1 Tablet(s) Oral daily  valGANciclovir 450 milliGRAM(s) Oral <User Schedule>      LABS:  08-16    133<L>  |  93<L>  |  57<H>  ----------------------------<  214<H>  3.5   |  24  |  3.59<H>    Ca    8.9      16 Aug 2022 07:31  Phos  2.8     08-16  Mg     2.2     08-16    TPro  6.6  /  Alb  2.6<L>  /  TBili  0.3  /  DBili  0.2  /  AST  17  /  ALT  16  /  AlkPhos  260<H>  08-16    Creatinine Trend: 3.59 <--, 4.24 <--, 3.17 <--, 3.72 <--, 3.54 <--, 3.14 <--, 2.91 <--, 3.48 <--, 2.71 <--, 4.25 <--, 3.70 <--    Phosphorus Level, Serum: 2.8 mg/dL (08-16 @ 07:31)  Albumin, Serum: 2.6 g/dL (08-16 @ 07:05)                              8.1    12.95 )-----------( 331      ( 16 Aug 2022 07:05 )             26.8     Urine Studies:  Urinalysis - [07-25-22 @ 18:28]      Color Yellow / Appearance Slightly Turbid / SG 1.020 / pH 5.5      Gluc Negative / Ketone Negative  / Bili Negative / Urobili Negative       Blood Small / Protein 100 / Leuk Est Large / Nitrite Negative      RBC 2 / WBC 40 / Hyaline 5 / Gran  / Sq Epi  / Non Sq Epi 0 / Bacteria Many      Iron 17, TIBC 171, %sat 10      [05-02-22 @ 13:03]  Ferritin 6336      [07-27-22 @ 13:00]  PTH -- (Ca 9.2)      [02-05-22 @ 10:13]   294  HbA1c 6.0      [02-21-20 @ 08:53]  TSH 4.69      [07-06-22 @ 18:36]  Lipid: chol --, , HDL --, LDL --      [07-27-22 @ 13:00]    HBsAg Nonreact      [07-25-22 @ 11:51]  HCV 0.18, Nonreact      [07-25-22 @ 11:51]      RADIOLOGY & ADDITIONAL STUDIES:

## 2022-08-16 NOTE — PROGRESS NOTE ADULT - PROBLEM SELECTOR PLAN 1
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 No  DSA.   s/p pulse dose steroids. Thymo was not given to treat the rejection as he is too frail and at increased risk for infections. Now with failed allograft function, Remains anuric and is dialysis dependent.   Pt underwent IR guided HD catheter exchange on 8/8/22.  Has been receiving dialysis daily. Last HD done on 8/15/22. Pt. appears clinically euvolemic. No plan for HD today. Monitor for labs.

## 2022-08-16 NOTE — CHART NOTE - NSCHARTNOTEFT_GEN_A_CORE
EEG preliminary read (not final) on the initial recording hour(s) = x 4    No seizures recorded.  Final report to follow tomorrow morning after completion of study.    [facility] [place] EEG Reading Room Ph#: [phone]  Epilepsy Answering Service after 5PM and before 8:30AM: Ph#: [phone]

## 2022-08-16 NOTE — PROGRESS NOTE ADULT - ATTENDING COMMENTS
66 year old male with PMH of  DM type II, HTN, CAD s/p CABG in 2020, Afib on Eliquis, CVA in 2019 due to Afib, Seizure d/o (last episode was on 4/8/22), h/o GI bleed in 2/2022 EGD with duodenal ulcer and ESRD on HD s/p R DDRT from DCD donor on 4/21/22 complicated by DGF requiring HD until 4/29/22, later had good graft function who was readmitted with status epilepticus in setting of UTI/antibiotics and sub-therapeutic valproic acid level.  Transferred to SICU on 7/25 with signs of sepsis. Biopsy from 7/29 demonstrating grade 2a rejection and is now dialysis dependent.    1. 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 No  DSA.   s/p pulse dose steroids. Thymo was not given to treat the rejection as he is too frail and at increased risk for infections. Now with failed allograft function, Remains anuric and is dialysis dependent.   Pt underwent IR guided HD catheter exchange on 8/8/22.  Has been receiving dialysis daily. Last HD today on 8/15     2. IS meds- on Sirolimus ,  mg po bid and steroid taper.   3. AMS , seizures - On Fosphenytoin. No further episodes of seizures since 8/3. Neurology following.  4. DM -  on Lantus and lispro.   5. Cellulitis over RLQ - On Vancomycin . CT A/P on 8/6 revealed a retroperitoneal hematoma. Txp USG 8/6  - Patent renal artery vasculature. Stable appearance the transplant kidney .Persistent elevated resistive indices. Diffuse abdominal wall edematous changes right lower quadrant without focal collection, cultures from 8/3  negative so far. Skin bx done on 8/8/22 showed no significant findings. ID follow up.  Improving.

## 2022-08-16 NOTE — PROGRESS NOTE ADULT - ASSESSMENT
Patient is a 66  M with Type 2 DM> unknown control due to skewed A1C 6.6% secondary to chronic anemia and s/p blood tx. On basal/bolus insulin therapy PTA. DM c/b ESRD s/p DDRT on 3/21, CVA, CAD s/p CABG, Afib on apixaban, seizure activity, HTN, HLD, h/o GI bleed in 2/2022 EGD with duodenal ulcer, discharged to [facility] rehab center after prior hospitalization, now re-admitted from [facility] for seizures c/f status epilepticus in setting of UTI. Endocrine consulted for steroid induced hyperglycemia. Prolonged hospital course w/ ICU stay, previously intubated/extubated, previous seizures. S/p ureteral stent removal 7/12/22.       1.  DM  - T2DM   - Most recent Hemoglobin A1C 6.0 unreliable in the setting of anemia  - Current FS ranges from 170-220  - Current inpatient regimen: Glargine 6, Admelog 7, LDCC  - Current diet: CHO  - Please monitor blood glucose values TID AC & QHS while eating regular meals and Q6H while NPO  - Blood glucose goals pre-meal less than 140 mg/dL and random blood glucose less than 180 mg/dL  - Recommendations:  - Currently on Prednisone steroid  10 mg daily  - Fasting glucose elevated, increase insulin Glargine to 8 units QHS  - Elevated postprandial glucose, increased insulin Lispro to 8 units TID with meals, hold if NPO or if eating less than 50% of meals  - Continue with low dose correctional scale TID with meals  - Continue with low dose correctional scale QHS    Discharge planning:   - plan to rehab, likely will need basal bolus insulin final doses tbd  Outpatient f/u with Endocrinologist Dr. Lincoln. [address]. Phone  Needs apt at time of discharge  -Needs optho/renal/cardiac f/u as out pt  -Make sure pt has insulin and DM supplies at time of discharge.    2. HTN  - BP goal 130/80  - Manage per primary team     3. Hypothyroidism   - Continue with LT4 50 mcg daily   - Noted med given with other meds including Protonix which inhibits LT4 absorption.   - Please make sure LT4 is given on an empty stomach at least one hour apart from other meds and food to ensure med absorption. DO NOT GIVE WITH VITAMINS/ANTACIDS  - If unable to ensure proper time administration switching to IV dosing in order to ensure med absorption. Synthroid 37.5mcg IV   Monitor TFTs outpatient. TSH can be skewed when critically ill.    Thank you for the consult. Will continue to monitor. Contact via pager or [employer] Teams during business hours. On evenings and weekends, please call [phone] or page endocrine fellow on call. Patient can follow up at discharge with [facility] by calling [phone] to make an appointment.     greater than 50% of the encounter was spent counseling and/or coordination of care.  27 minutes spent on total encounter; The necessity of the time spent during the encounter on this date of service was due to development of plan of care/coordination of care/glycemic control through review of labs, blood glucose values and vital signs.     Josefina Martin MD  Department of Endocrinology, Diabetes and metabolism   Pager [phone]

## 2022-08-17 NOTE — PROGRESS NOTE ADULT - SUBJECTIVE AND OBJECTIVE BOX
Neurology Progress Note    SUBJECTIVE/OBJECTIVE/INTERVAL EVENTS: Patient seen and examined at bedside w/ neuro attending and team. Patient seen and examined at bedside w/ neuro attending and team. He was more awake and alert and able to answer most questions.     VITALS & EXAMINATION:  Vital Signs Last 24 Hrs  T(C): 36.9 (17 Aug 2022 13:00), Max: 37.4 (16 Aug 2022 21:00)  T(F): 98.5 (17 Aug 2022 13:00), Max: 99.4 (16 Aug 2022 21:00)  HR: 71 (17 Aug 2022 13:00) (71 - 84)  BP: 148/62 (17 Aug 2022 13:00) (148/62 - 193/87)  BP(mean): --  RR: 18 (17 Aug 2022 13:00) (18 - 18)  SpO2: 96% (17 Aug 2022 13:00) (96% - 98%)    Parameters below as of 17 Aug 2022 13:00  Patient On (Oxygen Delivery Method): room air    General appearance: does not appear to be in pain  Head: normocephalic  Eyes: clear sclera  Respiratory: breathing regularly    Neurologic exam:  MS - awake, alert, oriented to person, place partial date. Follows simple commands. Attend to stimuli from both sides.   CN - pupils equal round, EOMI, BTT b/l, mild R facial droop, cannot assess CNV, hearing intact to conversation b/l, shoulder shrug limited evaluation   Motor - Normal bulk. Inc tone in L side and normal tone in R side. Spontaneous movements of L > R side and at least antigravity. Proximal str LUE 4+, RUE 4, LLE 4-, RLE 3    Sens - LT/temp intact all, as above  Toes downgoing bilaterally   Gait and station - PERRY       LABORATORY:  CBC                       7.4    12.02 )-----------( 315      ( 17 Aug 2022 08:12 )             24.0     Chem 08-17    132<L>  |  92<L>  |  80<H>  ----------------------------<  126<H>  3.9   |  24  |  4.59<H>    Ca    8.9      17 Aug 2022 08:12  Phos  3.8     08-17  Mg     2.0         TPro  6.7  /  Alb  2.6<L>  /  TBili  0.3  /  DBili  0.2  /  AST  25  /  ALT  19  /  AlkPhos  295<H>      LFTs LIVER FUNCTIONS - ( 17 Aug 2022 08:12 )  Alb: 2.6 g/dL / Pro: 6.7 g/dL / ALK PHOS: 295 U/L / ALT: 19 U/L / AST: 25 U/L / GGT: x           Coagulopathy PT/INR - ( 16 Aug 2022 07:06 )   PT: 13.8 sec;   INR: 1.20 ratio         PTT - ( 17 Aug 2022 08:12 )  PTT:31.4 sec  Lipid Panel  Chol -- LDL -- HDL -- Trig 118  A1c   Cardiac enzymes     U/A Urinalysis Basic - ( 16 Aug 2022 18:49 )    Color: Yellow / Appearance: Slightly Turbid / S.018 / pH: x  Gluc: x / Ketone: Negative  / Bili: Negative / Urobili: Negative   Blood: x / Protein: >600 / Nitrite: Positive   Leuk Esterase: Moderate / RBC: 13 /hpf / WBC 40 /HPF   Sq Epi: x / Non Sq Epi: 4 / Bacteria: Moderate    CSF  Immunological  Other    STUDIES & IMAGING: (EEG, CT, MR, U/S, TTE/DINAH): Neurology Progress Note    SUBJECTIVE/OBJECTIVE/INTERVAL EVENTS: Patient seen and examined at bedside w/ neuro attending and team. Patient seen and examined at bedside w/ neuro attending and team. He was more awake and alert and able to answer most questions.    FHx: Non-contributory    ROS: All systems negative except as documented in Interval History    VITALS & EXAMINATION:  Vital Signs Last 24 Hrs  T(C): 36.9 (17 Aug 2022 13:00), Max: 37.4 (16 Aug 2022 21:00)  T(F): 98.5 (17 Aug 2022 13:00), Max: 99.4 (16 Aug 2022 21:00)  HR: 71 (17 Aug 2022 13:00) (71 - 84)  BP: 148/62 (17 Aug 2022 13:00) (148/62 - 193/87)  BP(mean): --  RR: 18 (17 Aug 2022 13:00) (18 - 18)  SpO2: 96% (17 Aug 2022 13:00) (96% - 98%)    Parameters below as of 17 Aug 2022 13:00  Patient On (Oxygen Delivery Method): room air    General appearance: does not appear to be in pain  Head: normocephalic  Eyes: clear sclera  Respiratory: breathing regularly    Neurologic exam:  MS - awake, alert, oriented to person, place partial date. Follows simple commands. Attend to stimuli from both sides.   CN - pupils equal round, EOMI, BTT b/l, mild R facial droop, cannot assess CNV, hearing intact to conversation b/l, shoulder shrug limited evaluation   Motor - Normal bulk. Inc tone in L side and normal tone in R side. Spontaneous movements of L > R side and at least antigravity. Proximal str LUE 4+, RUE 4, LLE 4-, RLE 3    Sens - LT/temp intact all, as above  Toes downgoing bilaterally   Gait and station - PERRY       LABORATORY:  CBC                       7.4    12.02 )-----------( 315      ( 17 Aug 2022 08:12 )             24.0     Chem 08-17    132<L>  |  92<L>  |  80<H>  ----------------------------<  126<H>  3.9   |  24  |  4.59<H>    Ca    8.9      17 Aug 2022 08:12  Phos  3.8       Mg     2.0         TPro  6.7  /  Alb  2.6<L>  /  TBili  0.3  /  DBili  0.2  /  AST  25  /  ALT  19  /  AlkPhos  295<H>  -    LFTs LIVER FUNCTIONS - ( 17 Aug 2022 08:12 )  Alb: 2.6 g/dL / Pro: 6.7 g/dL / ALK PHOS: 295 U/L / ALT: 19 U/L / AST: 25 U/L / GGT: x           Coagulopathy PT/INR - ( 16 Aug 2022 07:06 )   PT: 13.8 sec;   INR: 1.20 ratio         PTT - ( 17 Aug 2022 08:12 )  PTT:31.4 sec  Lipid Panel  Chol -- LDL -- HDL -- Trig 118  A1c   Cardiac enzymes     U/A Urinalysis Basic - ( 16 Aug 2022 18:49 )    Color: Yellow / Appearance: Slightly Turbid / S.018 / pH: x  Gluc: x / Ketone: Negative  / Bili: Negative / Urobili: Negative   Blood: x / Protein: >600 / Nitrite: Positive   Leuk Esterase: Moderate / RBC: 13 /hpf / WBC 40 /HPF   Sq Epi: x / Non Sq Epi: 4 / Bacteria: Moderate    CSF  Immunological  Other    STUDIES & IMAGING: (EEG, CT, MR, U/S, TTE/DINAH):

## 2022-08-17 NOTE — PROGRESS NOTE ADULT - SUBJECTIVE AND OBJECTIVE BOX
HPI:      Patient is a 66  M with Type 2 DM> unknown control due to skewed A1C 6.6% secondary to chronic anemia and s/p blood tx. On basal/bolus insulin therapy PTA. DM c/b ESRD s/p DDRT on 3/21, CVA, CAD s/p CABG, Afib on apixaban, seizure activity, HTN, HLD, h/o GI bleed in 2/2022 EGD with duodenal ulcer, discharged to [email or web address]hab center after prior hospitalization, now re-admitted from [email or web address] for seizures c/f status epilepticus in setting of UTI. Endocrine consulted for steroid induced hyperglycemia. Prolonged hospital course w/ ICU stay, previously intubated/extubated, previous seizures. S/p ureteral stent removal 7/12/22.        Home diabetes medications:   -  Semglee 12 units qhs, humalog 5 units TID pre-meal.    Inpatient diabetes medications:   Glargine 6 units QHS  - Admelog 7 TIDAC  - LDCC     Most recent HbA1C 6.0 not reliable due to anemia and ESRD       INTERVAL HPI/OVERNIGHT EVENTS:  See at the bedside. On EEG for seizure monitoring. On Prednisone 10 mg given at 6 am every day. Currently denies polydipsia, polyuria , visual changes, numbness in feet. Denies chest pain, nausea or vomiting.     Review of systems:   CONSTITUTIONAL:  Feels well, good appetite  CARDIOVASCULAR:  Negative for chest pain or palpitations  RESPIRATORY:  Negative for cough, or SOB   GASTROINTESTINAL:  Negative for nausea, vomiting, or abdominal pain  GENITOURINARY:  Negative frequency, urgency or dysuria     CAPILLARY BLOOD GLUCOSE      POCT Blood Glucose.: 133 mg/dL (17 Aug 2022 12:08)  POCT Blood Glucose.: 132 mg/dL (17 Aug 2022 08:29)  POCT Blood Glucose.: 199 mg/dL (16 Aug 2022 21:03)  POCT Blood Glucose.: 291 mg/dL (16 Aug 2022 17:29)  POCT Blood Glucose.: 315 mg/dL (16 Aug 2022 12:39)  POCT Blood Glucose.: 222 mg/dL (16 Aug 2022 08:23)  POCT Blood Glucose.: 208 mg/dL (16 Aug 2022 06:56)  POCT Blood Glucose.: 205 mg/dL (15 Aug 2022 21:47)  POCT Blood Glucose.: 183 mg/dL (15 Aug 2022 17:20)       MEDICATIONS  (STANDING):  amLODIPine   Tablet 10 milliGRAM(s) Oral daily  chlorhexidine 2% Cloths 1 Application(s) Topical <User Schedule>  doxazosin 4 milliGRAM(s) Oral <User Schedule>  epoetin cortney-epbx (RETACRIT) Injectable 43278 Unit(s) SubCutaneous every 7 days  finasteride 5 milliGRAM(s) Oral daily  fosphenytoin IVPB 200 milliGRAM(s) PE IV Intermittent every 12 hours  heparin   Injectable 5000 Unit(s) SubCutaneous every 12 hours  hydrALAZINE 25 milliGRAM(s) Oral every 8 hours  insulin glargine Injectable (LANTUS) 8 Unit(s) SubCutaneous at bedtime  insulin lispro (ADMELOG) corrective regimen sliding scale   SubCutaneous three times a day before meals  insulin lispro (ADMELOG) corrective regimen sliding scale   SubCutaneous at bedtime  insulin lispro Injectable (ADMELOG) 8 Unit(s) SubCutaneous three times a day before meals  levothyroxine 50 MICROGram(s) Oral daily  melatonin 6 milliGRAM(s) Oral at bedtime  mycophenolate mofetil 250 milliGRAM(s) Oral <User Schedule>  nystatin    Suspension 997726 Unit(s) Oral four times a day  pantoprazole    Tablet 40 milliGRAM(s) Oral <User Schedule>  petrolatum white Ointment 1 Application(s) Topical two times a day  polyethylene glycol 3350 17 Gram(s) Oral daily  predniSONE   Tablet 10 milliGRAM(s) Oral daily  senna 2 Tablet(s) Oral at bedtime  sirolimus 5 milliGRAM(s) Oral <User Schedule>  trimethoprim   80 mG/sulfamethoxazole 400 mG 1 Tablet(s) Oral daily  valGANciclovir 450 milliGRAM(s) Oral <User Schedule>          MEDICATIONS  (PRN):  acetaminophen     Tablet .. 650 milliGRAM(s) Oral every 6 hours PRN Mild Pain (1 - 3)  diphenhydrAMINE 25 milliGRAM(s) Oral at bedtime PRN Insomnia      PHYSICAL EXAM   Vital Signs Last 24 Hrs  T(C): 36.9 (17 Aug 2022 13:00), Max: 37.4 (16 Aug 2022 21:00)  T(F): 98.5 (17 Aug 2022 13:00), Max: 99.4 (16 Aug 2022 21:00)  HR: 71 (17 Aug 2022 13:00) (71 - 84)  BP: 148/62 (17 Aug 2022 13:00) (148/62 - 193/87)  BP(mean): --  RR: 18 (17 Aug 2022 13:00) (18 - 18)  SpO2: 96% (17 Aug 2022 13:00) (96% - 98%)    Parameters below as of 17 Aug 2022 13:00  Patient On (Oxygen Delivery Method): room air      GENERAL: Male laying in bed in NAD  Abdomen: Soft, nontender, non distended  Extremities: Warm, no edema in all 4 exts   NEURO: awake and alert    LABS:  08-17    132<L>  |  92<L>  |  80<H>  ----------------------------<  126<H>  3.9   |  24  |  4.59<H>    Ca    8.9      17 Aug 2022 08:12  Phos  3.8     08-17  Mg     2.0     08-17    TPro  6.7  /  Alb  2.6<L>  /  TBili  0.3  /  DBili  0.2  /  AST  25  /  ALT  19  /  AlkPhos  295<H>  08-17      PT/INR - ( 15 Aug 2022 05:45 )   PT: 13.8 sec;   INR: 1.20 ratio         PTT - ( 15 Aug 2022 05:45 )  PTT:29.7 sec    Thyroid Stimulating Hormone, Serum: 4.69 uIU/mL (07-06 @ 18:36)

## 2022-08-17 NOTE — PROGRESS NOTE ADULT - ASSESSMENT
67yo man with PMH of CVA (2019) with subsequent seizure disorder, ESRD s/p renal transplant (04/2022), afib (on apixaban), CAD (s/p stents), CABG (2020), HTN, HLD, DM presents to the ED with status epilepticus from [facility]. Semiology includes eyes deviated to the R, rhythmic R facial twitching as well as R shoulder clonic movements lasting approximately 30 seconds each. EEG from 04/2022 showed L frontocentral focal onset seizures. S/p intubation on 6/11. EEG negative for seizure but showed structural or functional abnormality in the left fronto-temporal region and moderate to severe nonspecific diffuse or multifocal cerebral dysfunction. RRT called 6/21 for decreased movement of RUE but with increased tone and tremor like activity/ clonus and diffuse increased tone and increased encephalopathy concerning for seizure. Course then complicated by EEG 6/24 showing L frontotemporal/ frontal seizures. Was on keppra, valproic acid, vimpat but had seizures through the prior two medications. Stopped vimpat, valproic acid, standing ativan, and fycompa. Stopped fycompa as patient became agitated, aggressive. Was only on phenytoin until recalled on evening of 8/3 into AM of 8/4 for concern of seizure. Now improved. Currently on fosphenytoin.    EEG 8/4:   2 Focal motor seizures originating from the left posterior temporal region with frontal evolution  Mild focal left centroparietal slowing   Mild generalized background slowing    EEG 8/5:   Clinical Impression:  Left hemispheric dysfunction  Mild diffuse cerebral dysfunction, not specific as to etiology.  No seizures since 8/4/22 5AM     EEG 8/6: Abnormal EEG study   One electroclinical seizure originating from the left centroparietal region of 1 minute of duration on 8/16/22 at 20:22PM. Clinically patient presented clonic movements of arms and legs.   Left hemispheric dysfunction with increased risk for seizures from the left posterior quadrant  Mild to moderate diffuse cerebral dysfunction, not specific as to etiology.    Impression: History of recent status epilepticus that was refractory, was stable on phenytoin. Post HD and hypertensive with concerns of systemic inflammatory process (elevated leukocytosis patient found with recurrent seizures. Mental status improved possibly from hospital-induced delirium with toxic-metabolic encephalopathy and infectious encephalopathy.     Recommendations:  [ ] Continue fosphenytoin 200mg q12h, if able to tolerate PO can consider switching to PO phenytoin 200mg BID.   [ ] Started briviact w/ one time loading dose 100mg x1 then maintenance of 50mg BID with additional 50mg after dialysis (please ensure this is added on days of dialysis)   [ ] Continue video EEG x24 hrs for at least one more day  [x] CSF without evidence of infection, inflammation  [x] Vitamin B12 1157, folate >20, homocysteine 13.4, Vitamin B1 160  [x] TSH 4.6, T3 3.9/T4 43 - correction of hypothyroidism per primary team and endocrinology  [x] BP goals of normotension   [x] MR brain without clear evidence of infection, inflammation, or acute CNS event (possibly minimal enhancement). Although prior study read as possible PRES, most recent MRI seems more consistent with significant chronic ischemic microvascular disease given no associated DWI changes or obvious enhancement  [x] Telemetry monitoring  [x] Neuro checks and vital signs per unit protocol  [x] Seizure/fall/aspiration precautions  [ ] please have adequate sleeping environment for the patient, light on, curtains open, TV on during the day with regular stimulation and out of bed to chair if possible. During the night, close curtains, TV off, lights off, and minimize interruptions (limit blood draws and vital sign checks if possible). Regular interaction with patient with staff (introducing selves), frequent reorientation, and encouragement of visitors as allowed by hospital and unit policy. Regular toileting.  [/] Advise against driving, operating heavy machinery, water sports/bathing, activity in elevation without appropriate safety precautions  [ ] outpatient EMG/ NCS    Patient seen and discussed with neurology attending, Dr. Maria.

## 2022-08-17 NOTE — PROGRESS NOTE ADULT - ASSESSMENT
Patient is a 66  M with Type 2 DM> unknown control due to skewed A1C 6.6% secondary to chronic anemia and s/p blood tx. On basal/bolus insulin therapy PTA. DM c/b ESRD s/p DDRT on 3/21, CVA, CAD s/p CABG, Afib on apixaban, seizure activity, HTN, HLD, h/o GI bleed in 2/2022 EGD with duodenal ulcer, discharged to [facility] rehab center after prior hospitalization, now re-admitted from [facility] for seizures c/f status epilepticus in setting of UTI. Endocrine consulted for steroid induced hyperglycemia. Prolonged hospital course w/ ICU stay, previously intubated/extubated, previous seizures. S/p ureteral stent removal 7/12/22.       1.  DM  - T2DM   - Most recent Hemoglobin A1C 6.0 unreliable in the setting of anemia  - Current FS ranges from 170-220  - Current diet: CHO  - Please monitor blood glucose values TID AC & QHS while eating regular meals and Q6H while NPO  - Blood glucose goals pre-meal less than 140 mg/dL and random blood glucose less than 180 mg/dL  - Recommendations:  - Currently on Prednisone steroid 10 mg daily  - Fasting glucose at goal, continue with insulin Glargine 8 units QHS  - Continue with insulin Lispro 8 units TID with meals, hold if NPO or if eating less than 50% of meals  - Current glucose at goal, if postprandial glucose elevated before and dinner, can consider adding a dose of NPH with prednisone dose in the morning   - Continue with low dose correctional scale TID with meals  - Continue with low dose correctional scale QHS    Discharge planning:   - plan to rehab, likely will need basal bolus insulin final doses tbd  Outpatient f/u with Endocrinologist Dr. Lincoln. [address] [facility] [address]. Phone  Needs apt at time of discharge  -Needs optho/renal/cardiac f/u as out pt  -Make sure pt has insulin and DM supplies at time of discharge.    2. HTN  - BP goal 130/80  - Manage per primary team     3. Hypothyroidism   - Continue with LT4 50 mcg daily   - Noted med given with other meds including Protonix which inhibits LT4 absorption.   - Please make sure LT4 is given on an empty stomach at least one hour apart from other meds and food to ensure med absorption. DO NOT GIVE WITH VITAMINS/ANTACIDS  - If unable to ensure proper time administration switching to IV dosing in order to ensure med absorption. Synthroid 37.5mcg IV   Monitor TFTs outpatient. TSH can be skewed when critically ill.    Thank you for the consult. Will continue to monitor. Contact via pager or [employer] Teams during business hours. On evenings and weekends, please call [phone] or page endocrine fellow on call. Patient can follow up at discharge with [facility] by calling [phone] to make an appointment.     greater than 50% of the encounter was spent counseling and/or coordination of care.  26 minutes spent on total encounter; The necessity of the time spent during the encounter on this date of service was due to development of plan of care/coordination of care/glycemic control through review of labs, blood glucose values and vital signs.     Josefina Martin MD  Department of Endocrinology, Diabetes and metabolism   Pager [phone]

## 2022-08-17 NOTE — EEG REPORT - NS EEG TEXT BOX
MANJINDERCHAD [id number]     Study Date: 08-16-22 1631PM-0800 8/17/22  Study hours: 15:12Hrs    --------------------------------------------------------------------------------------------------  History:  CC/ HPI Patient is a 66y old  Male who presents with a chief complaint of Status Epilepticus (04 Aug 2022 12:41)    MEDICATIONS  (STANDING):  chlorhexidine 2% Cloths 1 Application(s) Topical <User Schedule>  doxazosin 4 milliGRAM(s) Oral <User Schedule>  epoetin cortney-epbx (RETACRIT) Injectable 63170 Unit(s) SubCutaneous every 7 days  fosphenytoin IVPB 200 milliGRAM(s) PE IV Intermittent every 12 hours  heparin   Injectable 5000 Unit(s) SubCutaneous every 8 hours  hydrALAZINE 25 milliGRAM(s) Oral every 8 hours  insulin lispro (ADMELOG) corrective regimen sliding scale   SubCutaneous every 6 hours  melatonin 6 milliGRAM(s) Oral at bedtime  mycophenolate mofetil Suspension 1000 milliGRAM(s) Enteral Tube <User Schedule>  pantoprazole  Injectable 40 milliGRAM(s) IV Push daily  senna Syrup 10 milliLiter(s) Oral at bedtime  sodium bicarbonate 1300 milliGRAM(s) Oral <User Schedule>    --------------------------------------------------------------------------------------------------  Study Interpretation:    [Abbreviation Key:  PDR=alpha rhythm/posterior dominant rhythm. A-P=anterior posterior.  Amplitude: ‘very low’:<20; ‘low’:20-49; ‘medium’:; ‘high’:>150uV.  Persistence for periodic/rhythmic patterns (% of epoch) ‘rare’:<1%; ‘occasional’:1-10%; ‘frequent’:10-50%; ‘abundant’:50-90%; ‘continuous’:>90%.  Persistence for sporadic discharges: ‘rare’:<1/hr; ‘occasional’:1/min-1/hr; ‘frequent’:>1/min; ‘abundant’:>1/10 sec.  RPP=rhythmic and periodic patterns; GRDA=generalized rhythmic delta activity; FIRDA=frontal intermittent GRDA; LRDA=lateralized rhythmic delta activity; TIRDA=temporal intermittent rhythmic delta activity;  LPD=PLED=lateralized periodic discharges; GPD=generalized periodic discharges; BIPDs =bilateral independent periodic discharges; Mf=multifocal; SIRPDs=stimulus induced rhythmic, periodic, or ictal appearing discharges; BIRDs=brief potentially ictal rhythmic discharges >4 Hz, lasting .5-10s; PFA (paroxysmal bursts >13 Hz or =8 Hz <10s).  Modifiers: +F=with fast component; +S=with spike component; +R=with rhythmic component.  S-B=burst suppression pattern.  Max=maximal. N1-drowsy; N2-stage II sleep; N3-slow wave sleep. SSS/BETS=small sharp spikes/benign epileptiform transients of sleep. HV=hyperventilation; PS=photic stimulation]    Daily EEG Visual Analysis  FINDINGS:      Background:  Continuous: continuous  Symmetry: symmetric  PDR: poorly modulated     Reactivity: present  Voltage: normal (between 20-150uV)  Anterior Posterior Gradient: present  Other background findings: none  Breach: absent    Background Slowing:  Generalized slowing: polymorphic theta and delta slowing   Focal slowing: continuous left hemispheric theta and delta slowing    State Changes:   -Drowsiness noted with increased slowing, attenuation of fast activity, vertex transients.  -Absent N2 sleep transients    Sporadic Epileptiform Discharges:    None    Rhythmic and Periodic Patterns (RPPs):  Frequent short runs 2-5 seconds of rhythmic delta activity over the left posterior quadrant      Electrographic and Electroclinical seizures:  One electroclinical seizure EEG onset over the left posterior quadrant max C3, 1 minute duration.     d1 20:22:23		Seizure onset  d1 20:22:23		rhythmic theta Left posterior quadrant  d1 20:22:39		rhythmic delta post quadrant  d1 20:22:44		spread to the R post quadrant  d1 20:22:57		clonic mov arms/legs  d1 20:23:01		higher amplitude delta 3Hz  d1 20:23:07		pt moaning  d1 20:23:15		rhytmic delta 2Hz  d1 20:23:15		flexion of hips  d1 20:23:15		clonic L arm mov  d1 20:23:21		offset (electrographic)  d1 20:23:22		delta 1Hz post slowing    Other Clinical Events:  None    Activation Procedures:   -Hyperventilation was not performed.    -Photic stimulation was not performed.     Artifacts:  Intermittent myogenic and movement artifacts were noted.  Significant movement artifact after 0300AM    ECG:  The heart rate on single channel ECG was predominantly 80 BPM.    EEG Classification / Summary:  Abnormal EEG study  One electroclinical seizure EEG onset over the left posterior quadrant max C3, as described above  Frequent short runs 2-5 seconds of rhythmic delta activity over the left posterior quadrant    Mild to moderate focal left centroparietal slowing   Mild to moderater generalized background slowing    -----------------------------------------------------------------------------------------------------  Clinical Impression:  Abnormal EEG study   One electroclinical seizure originating from the left centroparietal region of 1 minute of duration on 8/16/22 at 20:22PM. Clinically patient presented clonic movements of arms and legs.   Left hemispheric dysfunction  Mild to moderate diffuse cerebral dysfunction, not specific as to etiology.    Preliminary report     Robert Stokes MD  Epilepsy Fellow , [facility]  Department of Neurology, [facility]    [facility] EEG Reading Room Ph#: [phone]  Epilepsy Answering Service after 5PM and before 8:30AM: Ph#: [phone]   MANJINDERCHAD [id number]     Study Date: 08-16-22 1631PM-0800 8/17/22  Study hours: 15:12Hrs    --------------------------------------------------------------------------------------------------  History:  CC/ HPI Patient is a 66y old  Male who presents with a chief complaint of Status Epilepticus (04 Aug 2022 12:41)    MEDICATIONS  (STANDING):  chlorhexidine 2% Cloths 1 Application(s) Topical <User Schedule>  doxazosin 4 milliGRAM(s) Oral <User Schedule>  epoetin cortney-epbx (RETACRIT) Injectable 40155 Unit(s) SubCutaneous every 7 days  fosphenytoin IVPB 200 milliGRAM(s) PE IV Intermittent every 12 hours  heparin   Injectable 5000 Unit(s) SubCutaneous every 8 hours  hydrALAZINE 25 milliGRAM(s) Oral every 8 hours  insulin lispro (ADMELOG) corrective regimen sliding scale   SubCutaneous every 6 hours  melatonin 6 milliGRAM(s) Oral at bedtime  mycophenolate mofetil Suspension 1000 milliGRAM(s) Enteral Tube <User Schedule>  pantoprazole  Injectable 40 milliGRAM(s) IV Push daily  senna Syrup 10 milliLiter(s) Oral at bedtime  sodium bicarbonate 1300 milliGRAM(s) Oral <User Schedule>    --------------------------------------------------------------------------------------------------  Study Interpretation:    [Abbreviation Key:  PDR=alpha rhythm/posterior dominant rhythm. A-P=anterior posterior.  Amplitude: ‘very low’:<20; ‘low’:20-49; ‘medium’:; ‘high’:>150uV.  Persistence for periodic/rhythmic patterns (% of epoch) ‘rare’:<1%; ‘occasional’:1-10%; ‘frequent’:10-50%; ‘abundant’:50-90%; ‘continuous’:>90%.  Persistence for sporadic discharges: ‘rare’:<1/hr; ‘occasional’:1/min-1/hr; ‘frequent’:>1/min; ‘abundant’:>1/10 sec.  RPP=rhythmic and periodic patterns; GRDA=generalized rhythmic delta activity; FIRDA=frontal intermittent GRDA; LRDA=lateralized rhythmic delta activity; TIRDA=temporal intermittent rhythmic delta activity;  LPD=PLED=lateralized periodic discharges; GPD=generalized periodic discharges; BIPDs =bilateral independent periodic discharges; Mf=multifocal; SIRPDs=stimulus induced rhythmic, periodic, or ictal appearing discharges; BIRDs=brief potentially ictal rhythmic discharges >4 Hz, lasting .5-10s; PFA (paroxysmal bursts >13 Hz or =8 Hz <10s).  Modifiers: +F=with fast component; +S=with spike component; +R=with rhythmic component.  S-B=burst suppression pattern.  Max=maximal. N1-drowsy; N2-stage II sleep; N3-slow wave sleep. SSS/BETS=small sharp spikes/benign epileptiform transients of sleep. HV=hyperventilation; PS=photic stimulation]    Daily EEG Visual Analysis  FINDINGS:      Background:  Continuous: continuous  Symmetry: symmetric  PDR: poorly modulated     Reactivity: present  Voltage: normal (between 20-150uV)  Anterior Posterior Gradient: present  Other background findings: none  Breach: absent    Background Slowing:  Generalized slowing: polymorphic theta and delta slowing   Focal slowing: continuous left hemispheric theta and delta slowing    State Changes:   -Drowsiness noted with increased slowing, attenuation of fast activity, vertex transients.  -Absent N2 sleep transients    Sporadic Epileptiform Discharges:    Frequent sharp waves over the left posterior quadrant      Electrographic and Electroclinical seizures:  One electroclinical seizure EEG onset over the left posterior quadrant, 1 minute duration.     d1 20:22:23		Seizure onset  d1 20:22:23		rhythmic theta Left posterior quadrant  d1 20:22:39		rhythmic delta post quadrant  d1 20:22:44		spread to the R post quadrant  d1 20:22:57		clonic mov arms/legs  d1 20:23:01		higher amplitude delta 3Hz  d1 20:23:07		pt moaning  d1 20:23:15		rhythmic delta 2Hz  d1 20:23:15		flexion of hips  d1 20:23:15		clonic L arm mov  d1 20:23:21		offset (electrographic)  d1 20:23:22		delta 1Hz post slowing    Other Clinical Events:  None    Activation Procedures:   -Hyperventilation was not performed.    -Photic stimulation was not performed.     Artifacts:  Intermittent myogenic and movement artifacts were noted.  Significant movement artifact after 0300AM    ECG:  The heart rate on single channel ECG was predominantly 80 BPM.    EEG Classification / Summary:  Abnormal EEG study  One electroclinical seizure EEG onset over the left posterior quadrant max C3, as described above  Frequent sharp waves over the left posterior quadrant    Mild to moderate focal left centroparietal slowing   Mild to moderater generalized background slowing    -----------------------------------------------------------------------------------------------------  Clinical Impression:  Abnormal EEG study   One electroclinical seizure originating from the left centroparietal region of 1 minute of duration on 8/16/22 at 20:22PM. Clinically patient presented clonic movements of arms and legs.   Left hemispheric dysfunction with increased risk for seizures from the left posterior quadrant  Mild to moderate diffuse cerebral dysfunction, not specific as to etiology.      Robert Stokes MD  Epilepsy Fellow , [facility]  Department of Neurology, [facility]    [facility] EEG Reading Room Ph#: [phone]  Epilepsy Answering Service after 5PM and before 8:30AM: Ph#: [phone]   MANJINDERCHAD [id number]     Study Date: 08-16-22 1631PM-0800 8/17/22  Study hours: 15:12Hrs    --------------------------------------------------------------------------------------------------  History:  CC/ HPI Patient is a 66y old  Male who presents with a chief complaint of Status Epilepticus (04 Aug 2022 12:41)    MEDICATIONS  (STANDING):  chlorhexidine 2% Cloths 1 Application(s) Topical <User Schedule>  doxazosin 4 milliGRAM(s) Oral <User Schedule>  epoetin cortney-epbx (RETACRIT) Injectable 55120 Unit(s) SubCutaneous every 7 days  fosphenytoin IVPB 200 milliGRAM(s) PE IV Intermittent every 12 hours  heparin   Injectable 5000 Unit(s) SubCutaneous every 8 hours  hydrALAZINE 25 milliGRAM(s) Oral every 8 hours  insulin lispro (ADMELOG) corrective regimen sliding scale   SubCutaneous every 6 hours  melatonin 6 milliGRAM(s) Oral at bedtime  mycophenolate mofetil Suspension 1000 milliGRAM(s) Enteral Tube <User Schedule>  pantoprazole  Injectable 40 milliGRAM(s) IV Push daily  senna Syrup 10 milliLiter(s) Oral at bedtime  sodium bicarbonate 1300 milliGRAM(s) Oral <User Schedule>    --------------------------------------------------------------------------------------------------  Study Interpretation:    [Abbreviation Key:  PDR=alpha rhythm/posterior dominant rhythm. A-P=anterior posterior.  Amplitude: ‘very low’:<20; ‘low’:20-49; ‘medium’:; ‘high’:>150uV.  Persistence for periodic/rhythmic patterns (% of epoch) ‘rare’:<1%; ‘occasional’:1-10%; ‘frequent’:10-50%; ‘abundant’:50-90%; ‘continuous’:>90%.  Persistence for sporadic discharges: ‘rare’:<1/hr; ‘occasional’:1/min-1/hr; ‘frequent’:>1/min; ‘abundant’:>1/10 sec.  RPP=rhythmic and periodic patterns; GRDA=generalized rhythmic delta activity; FIRDA=frontal intermittent GRDA; LRDA=lateralized rhythmic delta activity; TIRDA=temporal intermittent rhythmic delta activity;  LPD=PLED=lateralized periodic discharges; GPD=generalized periodic discharges; BIPDs =bilateral independent periodic discharges; Mf=multifocal; SIRPDs=stimulus induced rhythmic, periodic, or ictal appearing discharges; BIRDs=brief potentially ictal rhythmic discharges >4 Hz, lasting .5-10s; PFA (paroxysmal bursts >13 Hz or =8 Hz <10s).  Modifiers: +F=with fast component; +S=with spike component; +R=with rhythmic component.  S-B=burst suppression pattern.  Max=maximal. N1-drowsy; N2-stage II sleep; N3-slow wave sleep. SSS/BETS=small sharp spikes/benign epileptiform transients of sleep. HV=hyperventilation; PS=photic stimulation]    Daily EEG Visual Analysis  FINDINGS:      Background:  Continuous: continuous  Symmetry: symmetric  PDR: poorly modulated     Reactivity: present  Voltage: normal (between 20-150uV)  Anterior Posterior Gradient: present  Other background findings: none  Breach: absent    Background Slowing:  Generalized slowing: polymorphic theta and delta slowing   Focal slowing: continuous left hemispheric theta and delta slowing    State Changes:   -Drowsiness noted with increased slowing, attenuation of fast activity, vertex transients.  -Absent N2 sleep transients    Sporadic Epileptiform Discharges:    Frequent sharp waves over the left posterior quadrant      Electrographic and Electroclinical seizures:  One electroclinical seizure EEG onset over the left posterior quadrant, 1 minute duration.     d1 20:22:23		Seizure onset  d1 20:22:23		rhythmic theta Left posterior quadrant  d1 20:22:39		rhythmic delta post quadrant  d1 20:22:44		spread to the R post quadrant  d1 20:22:57		clonic mov arms/legs  d1 20:23:01		higher amplitude delta 3Hz  d1 20:23:07		pt moaning  d1 20:23:15		rhythmic delta 2Hz  d1 20:23:15		flexion of hips  d1 20:23:15		clonic L arm mov  d1 20:23:21		offset (electrographic)  d1 20:23:22		delta 1Hz post slowing    Other Clinical Events:  None    Activation Procedures:   -Hyperventilation was not performed.    -Photic stimulation was not performed.     Artifacts:  Intermittent myogenic and movement artifacts were noted.  Significant movement artifact after 0300AM    ECG:  The heart rate on single channel ECG was predominantly 80 BPM.    EEG Classification / Summary:  Abnormal EEG study  One electroclinical seizure EEG onset over the left posterior quadrant max C3, as described above  Frequent sharp waves over the left posterior quadrant    Mild to moderate focal left centroparietal slowing   Mild to moderater generalized background slowing    -----------------------------------------------------------------------------------------------------  Clinical Impression:  Abnormal EEG study   One electroclinical seizure originating from the left centroparietal region of 1 minute of duration on 8/16/22 at 20:22PM. Clinically patient presented clonic movements of arms and legs.   Left hemispheric dysfunction with increased risk for seizures from the left posterior quadrant  Mild to moderate diffuse cerebral dysfunction, not specific as to etiology.      Robert Stokes MD  Epilepsy Fellow , [facility]  Department of Neurology, [facility]    Gurwinder Carpenter MD  EEG/Epilepsy Attending       [facility] EEG Reading Room Ph#: [phone]  Epilepsy Answering Service after 5PM and before 8:30AM: Ph#: [phone]

## 2022-08-17 NOTE — PROGRESS NOTE ADULT - ASSESSMENT
66M with h/o DM type II, HTN, CAD s/p CABG in 2020, Afib on Eliquis, CVA in 2019 due to Afib, Seizure d/o (last episode was on 4/8/22), h/o GI bleed in 2/2022 EGD with duodenal ulcer and ESRD on HD s/p R DDRT from DCD donor on 4/21/22 complicated by DGF requiring HD until 4/29/22 who presented with status epilepticus in setting of UTI/antibiotics and sub-therapeutic valproic acid level. Transferred to SICU on 7/25 with signs of sepsis, now with refractory seizures    Seizure Disorder:  - Neuro/Epilepsy Teams following. Avoid Fycompa 2/2 agitation in past  - prior MRI head 6/27 with less concerns for PRES, likely ischemic changes  - o/n CTH 8/3 with no acute findings  - replete lytes aggressively  - Continue Fosphenytoin and FU fosphenytoin level daily    R DCD DDRT 4/21/22 c/b 2A ACR  - 2A ACR: s/p pulse steroids. Continue Prednisone 20mg QD  - IR consulted for right psoas muscle/fascia biopsy  - HD/UF daily as per nephrology for fluid overload/anuria  - Scrotal support  - On vanc by level  - Send surveillance BCx today  - graft doppler stable, stable perinephric collection last US 8/5 and CT scan 8/8  - S/P removal of ureteral stent on 7/12  - Continue Doxazosin/Proscar for BPH  - OOB with RN/PT  - DSA negative   - Renal Soft diet as tolerated with PO supplements  - 8/8 Punch biopsy wound Culture: ngtd. Negative for PTLD  - DVT ppx with SQH  - fungitel negative  - LE studies WNL  - Benadryl PRN for sleep    Immunosuppression:   - Belatacept: 6/23-8/1 discontinued  - on Sirolimus as of  8/2 daily based on level, Cellcept 250 BID, Pred 20mg QD  - PPX:  Nystatin, bactrim, valcyte (MWF)    DM  - controlled  - Fingerstick ac and qhs  - Continue Lantus 6u qpm and premeal Humalog with SSI  - Endo following    AFib/R IJ DVT   - Eliquis with ~40% less efficacy while on fosphenytoin.    - Lovenox held for ANA and transaminitis   - rate controlled  - will discuss A/C plans daily    HTN:  - Amlodipine/Doxazosin/Hydralazline  - off Coreg due to bradycardia   66M with h/o DM type II, HTN, CAD s/p CABG in 2020, Afib on Eliquis, CVA in 2019 due to Afib, Seizure d/o (last episode was on 4/8/22), h/o GI bleed in 2/2022 EGD with duodenal ulcer and ESRD on HD s/p R DDRT from DCD donor on 4/21/22 complicated by DGF requiring HD until 4/29/22 who presented with status epilepticus in setting of UTI/antibiotics and sub-therapeutic valproic acid level. Transferred to SICU on 7/25 with signs of sepsis, now with refractory seizures    Seizure Disorder:  - Neuro/Epilepsy Teams following. Avoid Fycompa 2/2 agitation in past  - + seizure on EEG 8/16. Neuro recommending additional anti-seizure medication Breviact to start today  - COntinue EEG  - Continue Fosphenytoin and FU fosphenytoin level daily  - prior MRI head 6/27 with less concerns for PRES, likely ischemic changes  - o/n CTH 8/3 with no acute findings  - replete lytes aggressively      R DCD DDRT 4/21/22 c/b 2A ACR  - 2A ACR: s/p pulse steroids. Continue Prednisone 10mg QD  - Deferring right psoas muscle/fascia biopsy as wound is not worsening  - HD/UF daily as per nephrology for fluid overload/anuria  - Scrotal support  - On vanc by level  - 8/14 surveillance BCx negative  - graft doppler stable, stable perinephric collection last US 8/5 and CT scan 8/8  - S/P removal of ureteral stent on 7/12  - Continue Doxazosin/Proscar for BPH  - OOB with RN/PT  - DSA negative   - Renal Soft diet as tolerated with PO supplements  - 8/8 Punch biopsy wound Culture: ngtd. Negative for PTLD  - DVT ppx with SQH  - fungitel negative  - LE studies WNL  - Benadryl PRN for sleep  - Daily CRP    Immunosuppression:   - Belatacept: 6/23-8/1 discontinued  - on Sirolimus as of  8/2 daily based on level, Cellcept 250 BID, Pred 20mg QD  - PPX:  Nystatin, bactrim, valcyte (MWF)    DM  - controlled  - Fingerstick ac and qhs  - Continue Lantus 6u qpm and premeal Humalog with SSI  - Endo following    AFib/R IJ DVT   - Eliquis with ~40% less efficacy while on fosphenytoin.    - Lovenox held for ANA and transaminitis   - rate controlled  - will discuss A/C plans daily    HTN:  - Amlodipine/Doxazosin/Hydralazline  - off Coreg due to bradycardia

## 2022-08-17 NOTE — PROGRESS NOTE ADULT - PROBLEM SELECTOR PLAN 1
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 No  DSA.   s/p pulse dose steroids. Thymo was not given to treat the rejection as he is too frail and at increased risk for infections. Now with failed allograft function, Remains anuric and is dialysis dependent.   Pt underwent IR guided HD catheter exchange on 8/8/22. Has been receiving dialysis daily. Last HD done on 8/15/22. Plan for HD today. Monitor for labs.

## 2022-08-17 NOTE — PROGRESS NOTE ADULT - NUTRITIONAL ASSESSMENT

## 2022-08-17 NOTE — PROGRESS NOTE ADULT - ASSESSMENT
66M with ESRD s/p PermCath removal 5/10/22 s/p Rt DDRT 4/21/22,  CVA (2019), Afib on apixaban, seizure activity, CAD s/p stents, CABG (2020), HTN, HLD, DM on insulin, gastric/duodenal ulcer, recent hospitalization 4/28/22 -5/10/22 with weakness/anemia found to have perinephric hematoma requiring evacuation and repair of bleeding arterial anastomosis admitted 6/10 for status epilepticus requiring intubation for hypoxic respiratory failure.     s/p LP which was not suggestive of infectious process  U/A (7/12) 161 WBC  UCx (7/13) 50-99K ESBL Klebsiella   s/p 3 days of Ertapenem  UA (7/25) with 40 WBC  UCx (7/25) 50-99k ESBL Klebsiella     RUQ (7/25) with hepatomegaly   CT c/a/p (7/25) with only small perinephric fluid collection, small volume ascites.  Doppler US R Kidney (7/27) with mild thickening of collecting system and ureter and small   Doppler US R Kidney (7/29) with fullness of collecting system and continued urothelial thickening.     RVP (7/25) Negative  CMV PCR (7/22) Negative  EBV PCR (7/25) Negative   HBV PCR (7/25) Negative   HHV-6 PCR (7/25) POSITIVE   Parvovirus IgM and PCR (7/26) Negative   HSV 1/2 PCR (7/26) Negative   Adenovirus PCR (7/26) Negative    CT A/P (8/5) with No drainable fluid collections and Right psoas retroperitoneal hematoma.    Antibiotic Course:  Vancomycin: 6/11, 6/26 --> 6/29, 7/25, 8/4 -->   Ertapenem: 6/14 --> 6/18, 6/22 --> 6/23, 7/15 --> 7/17, 7/28 --> 8/3  Meropenem: 6/26 --> 6/29, 7/25 --> 7/28  Fluconazole: 6/11 --> 6/21, 6/26 --> 7/6, 7/26 --> 8/10    #Rash, Erythema overlying Renal Transplant, Leukocytosis, Fever  Developed after 10 days of Carbapenem Therapy  No significant improvement after Vancomycin initiation  Concern for atypical infectious etiology (or noninfectious process)  Dermatology Surgical Path (8/8) nonspecific findings including edema, telangiectasia and sparse mixed infiltrate of lymphocytes and neutrophils, culture prelim negative, AFB negative.   Findings are nonspecific ?Cellulitis  --Stop Vancomycin; now s/p 14 days without improvement in erythema  --If continued erythema/pain at abdominal wall would consider biopsy of abdominal wall muscle for additional culture yield  --Follow up on Dermatology Fungal and AFB Cultures    #Positive Blood Culture  BCx (8/8) with Coagulase negative staph 1 out of 4   BCx (8/9) NGTD  Suspect Coagulase negative staph is a contaminant  --Follow up on prelim cultures    #ESRD s/p DDRT (4/21/2022) (CMV +/+, EBV +/+)  --Continue Bactrim SS PO Q24H for PCP PPx  --Continue Valcyte 450 mg PO Mon/Wed/Fri for CMV PPx    I will continue to follow. Please feel free to contact me with any further questions.    Carlton Hoover M.D.  [facility] Division of Infectious Disease  8AM-5PM Monday - Friday: Available on [employer] Teams  After Hours and Holidays (or if no response on [employer] Teams): Please contact the Infectious Diseases Office at [phone]    The above assessment and plan were discussed with [place] [facility] surgery TAMMY

## 2022-08-17 NOTE — PROGRESS NOTE ADULT - ATTENDING COMMENTS
Interval History as per resident note, personally verified by me. Patient with one focal L centroparietal seizure while on vEEG but no further seizures reported. More somnolent today but no further episodes of perseveration. No additional focal neurologic deficits noted.    Focused neurologic exam:  MS - Awake and somnolent, AO x 2.5 (8/2022 but not rest of date), speech bradyphrenic  and mild to moderate dysarthria but otherwise fluent; had episode of 15-30 seconds of perseveration which resolved, follows simple commands. Rep/naming, attn/conc, recent and remote memory, fund of knowledge dec  CN - PERRL, EOMI, VFF, face sens/str intact b/l except for R NLFF, hearing intact to conversation b/l, SCM at least 4+/5 b/l and symmetric, tongue/palate midline  Motor - Normal bulk. Inc tone in L side (paratonic?) and normal tone in R side. RUE 4+/5, LUE 5/5, RLE 4/5, LLE 4+/5  Sens - LT/temp intact all, as above  DTR's - 2+ BUE's, 3+ R KJ, 2+ L KJ, 1+ AJ b/l, and downgoing b/l plantar response  Coord - Grossly appropriate for level of strength  Gait and station - Due to fall risk/safety concerns did not assess    Phenytoin level (8/12) - 4.6 (corrected 7.7)    A/P:  Epilepsy, intractable, with resolved status epilepticus  Encephalopathy  S/p renal transplant, ANA  Prior strokes  Atrial fibrillation  CAD  HTN  DM type 2  Transaminitis  Hyponatremia (Na 132)  Leukocytosis  UTI    - Patient with new episodes of perseveration concerning for non-convulsive seizures. Otherwise neurologically improved  - vEEG to evaluate for focal slowing, epileptiform discharges, and seizures. Continue until 8/18 but can then stop if no additional seizures  - Fosphenytoin 200mg IV BID, new level stable. Can convert to phenytoin 200mg PO BID once able to swallow  - As additional seizure would now start Briviact with 100mg IV x1 load and then maintenance with 50mg IV/PO [id number] TONIGHT and additional 50mg dose s/p HD  - Discussed restarting Eliquis for secondary stroke prevention with family and kidney transplant service. As this interacts with phenytoin would not be as ideal as it would be at lower efficacy. Also not optimal to change AED's given patient has experienced a number of side effects and he has a lower threshold for further seizures. Best options would be either Lovenox or warfarin (adjust to goal INR 2-3) long term. Family and other medical teams are in agreement  - Recent MRI brain w/ and w/o without clear evidence of infection, inflammation, or acute CNS event (possibly minimal enhancement). 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 May consider repeat MRI brain if seizures recur given high blood pressure but less likely  - Continue to address above medical problems, as you are doing  - Will continue to follow patient with you

## 2022-08-17 NOTE — PROGRESS NOTE ADULT - ASSESSMENT
66 yr old Male with PMHx ESRD s/p permacath removal 5/10/22 s/p Rt DDRT 4/21/22,  CVA (2019), Afib on apixaban, seizure activity, CAD s/p stents, CABG (2020), HTN, HLD, DM on insulin, gastric/duodenal ulcer, recent hospitalization 4/28/22 -5/10/22 with weakness/anemia found to have perinephric hematoma requiring evacuation and repair of bleeding arterial anastomosis. Curently being treated for UTI with Levaquin Today after developing multiple sz episodes refractory to 5 mg versed IM (EMS) and 2 mg ativan IV in E.D. concerning for status epilepticus requiring intubation for hypoxic respiratory  failure. MICU Consult was called for hypoxic respiratory failure secondary to status epilepticus.  Nephrology consulted for renal failure.     A/P  DDRT on 04/21/22  Course complicated by DGF, was on HD until 4/29/22. Readmitted in May for anemia in the setting of large perinephric hematoma s/p evacuation and repair of arterial anastomosis on 5/02/22. Intra operative biopsy showed no rejection.  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   Now with failed allograft function, Remains dialysis dependent.   continue Immunosuppression per transplant team  last HD 08/15/22, plan for HD today -  RRT per transplant team    transplant team on board   monitor BMP, U/O     HTN   BP manage by transplant team    monitor BP closely

## 2022-08-17 NOTE — PROGRESS NOTE ADULT - SUBJECTIVE AND OBJECTIVE BOX
Transplant Surgery - Multidisciplinary Progress Note  --------------------------------------------------------------  DDRT     4/21/2022             Present:  Patient seen with multidisciplinary team including Transplant Surgeon: Dr. Barber,   Nephrologist: Dr. JONO Lomeli, WALDO Garcia, resident Melecio Herring. Disciplines not in attendance will be notified of the plan.      66M with PMH: DM type II, HTN, CAD s/p CABG in 2020, AFib on Eliquis, CVA in 2019 due to Afib, Seizure d/o following CVA, last episode was on 4/8/22, h/o GIB in 2/2022 EGD with duodenal ulcer.  ESRD due to DM was on HD since 2019.     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  Donor was 58 , KDPI 82%, DCD, single vessels and ureter, HLA mismatch 1, 2, 2. No DSA, cPRA 0%. 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 Re-admitted in May for anemia in the setting of large perinephric hematoma s/p evacuation and repair of arterial anastomosis on 5/2/22. Intra operative biopsy showed no rejection, Creatinine ranging ~2mg/dL.    In Rehab:  He was recently dx with klebsiella UTI with Ertapenem - then switched to Levaquin    He also had parainfluenza pneumonia. Was at rehab progressing well.      Hospital course:  - 6/10: transferred from rehab facility for seizure status epilepticus. Intubated for respiratory protection and transferred to MICU.  EEG showed no seizure activity. Neuro consulted.   - 6/11: Extubated. Found to have R pleural effusion. s/p Thoracentesis 1.3L. Eliquis changed to heparin drip.  Post procedure drop in H&H. 5u PRBC, 2 u FFP.   - 6/11 evening: Transferred to SICU from MICU for further care in setting of hypoxia, significant R pleural effusion, anemia and hemodynamic instability  - 6/11 Intubated at 9pm and sedated on Precedex.  CT placed, 600ml blood drained.  1L total overnight, started pressors  - 6/11 Received Protamine for PTT >100 (was on Heparin drip started for AF), 2 u FFP and 5u PRBC total  - 6/13 s/p VATS 2.2L old blood removed; second chest tube placed  - 6/18-19: chest tubes removed  - 6/21: ?PRES vs. chronic ischemic changes on MRI, off Envarsus, switched to Belatacept 6/23 with good graft function  - 6/25: Tx to SICU for refractory seizures, improved with IV antiepileptic regimen  - 7/10: Downgraded from SICU to floor.   - 7/11: OR-->URETERAL STENT REMOVED  - 7/15: ESBL Kleb UTI (50-90K), completed Ertapenem x 3 Days  - 7/25: Tx to SICU for Sepsis/ elevated LFTS  - 7/27: Shiley placed for HD  - 7/28: ongoing fevers -> started Ertapenem/Fluc  - 7/29: HD restarted + 1U PRBC.  IR bx with 2A rejection, started pulse steroids. LFTs have improved  - 8/4: refractory seizures    Interval events:   - EEG restarted. 1 minute Seizure documented  with clonic movements at 8:20pm  - Awake and alert this morning.  Ox2-3  - R sided erythema around wound w/ scrotal and penile edema relatively stable. Reports minimal discomfort  - WBC downtrending   - IR t psoas muscle/fascia biopsy  - Minimal urine output, cloudy appearance    Immunosuppression  Induction:  Thymoglobulin                                        Maintenance: Sirolimus as of 8/2,  Cellcept to 250 BID, Pred 20mg po qd   Ongoing monitoring for signs of rejection.    Potential Discharge date: pending clinical improvement.     Education:  Medications    Plan of care:  See Below        MEDICATIONS  (STANDING):  amLODIPine   Tablet 10 milliGRAM(s) Oral daily  chlorhexidine 2% Cloths 1 Application(s) Topical <User Schedule>  doxazosin 4 milliGRAM(s) Oral <User Schedule>  epoetin cortney-epbx (RETACRIT) Injectable 64042 Unit(s) SubCutaneous every 7 days  finasteride 5 milliGRAM(s) Oral daily  fosphenytoin IVPB 200 milliGRAM(s) PE IV Intermittent every 12 hours  heparin   Injectable 5000 Unit(s) SubCutaneous every 8 hours  hydrALAZINE 25 milliGRAM(s) Oral every 8 hours  insulin glargine Injectable (LANTUS) 6 Unit(s) SubCutaneous at bedtime  insulin lispro (ADMELOG) corrective regimen sliding scale   SubCutaneous three times a day before meals  insulin lispro (ADMELOG) corrective regimen sliding scale   SubCutaneous at bedtime  insulin lispro Injectable (ADMELOG) 5 Unit(s) SubCutaneous three times a day before meals  levothyroxine 50 MICROGram(s) Oral daily  melatonin 6 milliGRAM(s) Oral at bedtime  mycophenolate mofetil 250 milliGRAM(s) Oral <User Schedule>  nystatin    Suspension 658452 Unit(s) Oral four times a day  pantoprazole    Tablet 40 milliGRAM(s) Oral <User Schedule>  petrolatum white Ointment 1 Application(s) Topical two times a day  polyethylene glycol 3350 17 Gram(s) Oral daily  senna 2 Tablet(s) Oral at bedtime  sirolimus 3.5 milliGRAM(s) Oral <User Schedule>  trimethoprim   80 mG/sulfamethoxazole 400 mG 1 Tablet(s) Oral daily  valGANciclovir 450 milliGRAM(s) Oral <User Schedule>  vancomycin  IVPB 1000 milliGRAM(s) IV Intermittent once    MEDICATIONS  (PRN):  acetaminophen     Tablet .. 650 milliGRAM(s) Oral every 6 hours PRN Mild Pain (1 - 3)  diphenhydrAMINE 25 milliGRAM(s) Oral at bedtime PRN Insomnia      PAST MEDICAL & SURGICAL HISTORY:  Hypertension      Diabetes      Dyslipidemia      CAD (Coronary Artery Disease)  with Stents in 06/2009 and 6/2019, s/p off-pump C3L on 8/13/20      Hypothyroidism      CVA (cerebral vascular accident)  12/13/19 with residual bilateral weakness      Anemia      PEG (percutaneous endoscopic gastrostomy) status  removed July 2020      Intubation of airway performed without difficulty  Dec 2019  CVA c/b status epilepticus requiring intubation and PEG in 12/2019-      ESRD on dialysis  M/W/F      Seizures  after CVA, last seizure was last week 4/07/22      History of insertion of stent into coronary artery bypass graft  2009 and 2019      S/P CABG x 3  off pump C3L on 8/13/20          Vital Signs Last 24 Hrs  T(C): 36.7 (15 Aug 2022 09:00), Max: 36.7 (15 Aug 2022 09:00)  T(F): 98 (15 Aug 2022 09:00), Max: 98 (15 Aug 2022 09:00)  HR: 75 (15 Aug 2022 09:00) (67 - 81)  BP: 149/66 (15 Aug 2022 09:00) (137/62 - 170/64)  BP(mean): --  RR: 18 (15 Aug 2022 09:00) (18 - 18)  SpO2: 95% (15 Aug 2022 09:00) (95% - 98%)    Parameters below as of 15 Aug 2022 09:00  Patient On (Oxygen Delivery Method): room air        I&O's Summary    14 Aug 2022 07:01  -  15 Aug 2022 07:00  --------------------------------------------------------  IN: 1240 mL / OUT: 440 mL / NET: 800 mL                              8.0    13.27 )-----------( 332      ( 15 Aug 2022 05:37 )             25.2     08-15    132<L>  |  93<L>  |  72<H>  ----------------------------<  153<H>  4.3   |  24  |  4.24<H>    Ca    8.5      15 Aug 2022 05:41  Phos  3.1     08-15  Mg     2.0     08-15    TPro  6.2  /  Alb  2.2<L>  /  TBili  0.2  /  DBili  0.1  /  AST  15  /  ALT  14  /  AlkPhos  223<H>  08-15          Culture - Blood (collected 08-09-22 @ 18:32)  Source: .Blood Blood-Peripheral  Final Report (08-15-22 @ 02:00):    No Growth Final    Culture - Blood (collected 08-09-22 @ 18:32)  Source: .Blood Blood-Peripheral  Final Report (08-15-22 @ 02:00):    No Growth Final    Culture - Tissue with Gram Stain (collected 08-08-22 @ 17:44)  Source: .Tissue Other  Gram Stain (08-09-22 @ 02:45):    No polymorphonuclear cells seen seen per low power field    No organisms seen per oil power field  Final Report (08-13-22 @ 18:18):    No growth at 5 days    Culture - Acid Fast - Tissue w/Smear (collected 08-08-22 @ 17:44)  Source: .Tissue Other  Preliminary Report (08-10-22 @ 15:04):    Culture is being performed.    Culture - Fungal, Tissue (collected 08-08-22 @ 17:44)  Source: .Tissue Other  Preliminary Report (08-09-22 @ 08:13):    Testing in progress                  REVIEW OF SYSTEMS  --------------------------------------------------------------------------------  unable to obtain full accurate ROS, but denies CP, SOB.    PHYSICAL EXAM:  Constitutional: awake, weak  Eyes: Anicteric  ENMT: nc/at  Respiratory: not tachypneic, non-labored on RA    Cardiovascular: normotensive, regular rate on monitor   Gastrointestinal: Soft, non distended, nontender, RLQ incisional/groin ecchymotic.   Genitourinary: anuric on HD   Extremities: SCD's in place and working bilaterally, overall with improving edema/anasarca  Vascular: Palpable dp pulses bilaterally  Neurological: AAOx2  Skin:  As noted above. No ulcerations or lesions  Musculoskeletal: Moving all extremities  Psychiatric: overall calm, following directions     Transplant Surgery - Multidisciplinary Progress Note  --------------------------------------------------------------  DDRT     4/21/2022             Present:  Patient seen with multidisciplinary team including Transplant Surgeon: Dr. Barber,   Nephrologist: Dr. JONO Lomeli, WALDO Garcia, resident Melecio Herring. Disciplines not in attendance will be notified of the plan.      66M with PMH: DM type II, HTN, CAD s/p CABG in 2020, AFib on Eliquis, CVA in 2019 due to Afib, Seizure d/o following CVA, last episode was on 4/8/22, h/o GIB in 2/2022 EGD with duodenal ulcer.  ESRD due to DM was on HD since 2019.     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  Donor was 58 , KDPI 82%, DCD, single vessels and ureter, HLA mismatch 1, 2, 2. No DSA, cPRA 0%. 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 Re-admitted in May for anemia in the setting of large perinephric hematoma s/p evacuation and repair of arterial anastomosis on 5/2/22. Intra operative biopsy showed no rejection, Creatinine ranging ~2mg/dL.    In Rehab:  He was recently dx with klebsiella UTI with Ertapenem - then switched to Levaquin    He also had parainfluenza pneumonia. Was at rehab progressing well.      Hospital course:  - 6/10: transferred from rehab facility for seizure status epilepticus. Intubated for respiratory protection and transferred to MICU.  EEG showed no seizure activity. Neuro consulted.   - 6/11: Extubated. Found to have R pleural effusion. s/p Thoracentesis 1.3L. Eliquis changed to heparin drip.  Post procedure drop in H&H. 5u PRBC, 2 u FFP.   - 6/11 evening: Transferred to SICU from MICU for further care in setting of hypoxia, significant R pleural effusion, anemia and hemodynamic instability  - 6/11 Intubated at 9pm and sedated on Precedex.  CT placed, 600ml blood drained.  1L total overnight, started pressors  - 6/11 Received Protamine for PTT >100 (was on Heparin drip started for AF), 2 u FFP and 5u PRBC total  - 6/13 s/p VATS 2.2L old blood removed; second chest tube placed  - 6/18-19: chest tubes removed  - 6/21: ?PRES vs. chronic ischemic changes on MRI, off Envarsus, switched to Belatacept 6/23 with good graft function  - 6/25: Tx to SICU for refractory seizures, improved with IV antiepileptic regimen  - 7/10: Downgraded from SICU to floor.   - 7/11: OR-->URETERAL STENT REMOVED  - 7/15: ESBL Kleb UTI (50-90K), completed Ertapenem x 3 Days  - 7/25: Tx to SICU for Sepsis/ elevated LFTS  - 7/27: Shiley placed for HD  - 7/28: ongoing fevers -> started Ertapenem/Fluc  - 7/29: HD restarted + 1U PRBC.  IR bx with 2A rejection, started pulse steroids. LFTs have improved  - 8/4: refractory seizures    Interval events:   - EEG restarted. 1 minute Seizure documented  with clonic movements at 8:20pm  - Awake and alert this morning.  Ox2-3  - R sided erythema around wound w/ scrotal and penile edema relatively stable. Reports minimal discomfort  - WBC downtrending   - psoas muscle/fascia biopsy cancelled   - Minimal urine output, cloudy appearance    Immunosuppression  Induction:  Thymoglobulin                                        Maintenance: Sirolimus as of 8/2,  Cellcept to 250 BID, Pred 10mg po qd   Ongoing monitoring for signs of rejection.    Potential Discharge date: pending clinical improvement.     Education:  Medications    Plan of care:  See Below      MEDICATIONS  (STANDING):  amLODIPine   Tablet 10 milliGRAM(s) Oral daily  brivaracetam  IVPB 100 milliGRAM(s) IV Intermittent once  chlorhexidine 2% Cloths 1 Application(s) Topical <User Schedule>  doxazosin 4 milliGRAM(s) Oral <User Schedule>  epoetin cortney-epbx (RETACRIT) Injectable 32048 Unit(s) SubCutaneous every 7 days  finasteride 5 milliGRAM(s) Oral daily  fosphenytoin IVPB 200 milliGRAM(s) PE IV Intermittent every 12 hours  heparin   Injectable 5000 Unit(s) SubCutaneous every 12 hours  hydrALAZINE 25 milliGRAM(s) Oral every 8 hours  insulin glargine Injectable (LANTUS) 8 Unit(s) SubCutaneous at bedtime  insulin lispro (ADMELOG) corrective regimen sliding scale   SubCutaneous three times a day before meals  insulin lispro (ADMELOG) corrective regimen sliding scale   SubCutaneous at bedtime  insulin lispro Injectable (ADMELOG) 8 Unit(s) SubCutaneous three times a day before meals  levothyroxine 50 MICROGram(s) Oral daily  melatonin 6 milliGRAM(s) Oral at bedtime  mycophenolate mofetil 250 milliGRAM(s) Oral <User Schedule>  nystatin    Suspension 230745 Unit(s) Oral four times a day  pantoprazole    Tablet 40 milliGRAM(s) Oral <User Schedule>  petrolatum white Ointment 1 Application(s) Topical two times a day  polyethylene glycol 3350 17 Gram(s) Oral daily  predniSONE   Tablet 10 milliGRAM(s) Oral daily  senna 2 Tablet(s) Oral at bedtime  sirolimus 5 milliGRAM(s) Oral <User Schedule>  trimethoprim   80 mG/sulfamethoxazole 400 mG 1 Tablet(s) Oral daily  valGANciclovir 450 milliGRAM(s) Oral <User Schedule>    MEDICATIONS  (PRN):  acetaminophen     Tablet .. 650 milliGRAM(s) Oral every 6 hours PRN Mild Pain (1 - 3)  diphenhydrAMINE 25 milliGRAM(s) Oral at bedtime PRN Insomnia      PAST MEDICAL & SURGICAL HISTORY:  Hypertension      Diabetes      Dyslipidemia      CAD (Coronary Artery Disease)  with Stents in 06/2009 and 6/2019, s/p off-pump C3L on 8/13/20      Hypothyroidism      CVA (cerebral vascular accident)  12/13/19 with residual bilateral weakness      Anemia      PEG (percutaneous endoscopic gastrostomy) status  removed July 2020      Intubation of airway performed without difficulty  Dec 2019  CVA c/b status epilepticus requiring intubation and PEG in 12/2019-      ESRD on dialysis  M/W/F      Seizures  after CVA, last seizure was last week 4/07/22      History of insertion of stent into coronary artery bypass graft  2009 and 2019      S/P CABG x 3  off pump C3L on 8/13/20          Vital Signs Last 24 Hrs  T(C): 36.9 (17 Aug 2022 13:00), Max: 37.4 (16 Aug 2022 21:00)  T(F): 98.5 (17 Aug 2022 13:00), Max: 99.4 (16 Aug 2022 21:00)  HR: 71 (17 Aug 2022 13:00) (71 - 84)  BP: 148/62 (17 Aug 2022 13:00) (148/62 - 193/87)  BP(mean): --  RR: 18 (17 Aug 2022 13:00) (18 - 18)  SpO2: 96% (17 Aug 2022 13:00) (96% - 98%)    Parameters below as of 17 Aug 2022 13:00  Patient On (Oxygen Delivery Method): room air        I&O's Summary    16 Aug 2022 07:01  -  17 Aug 2022 07:00  --------------------------------------------------------  IN: 490 mL / OUT: 50 mL / NET: 440 mL                              7.4    12.02 )-----------( 315      ( 17 Aug 2022 08:12 )             24.0     08-17    132<L>  |  92<L>  |  80<H>  ----------------------------<  126<H>  3.9   |  24  |  4.59<H>    Ca    8.9      17 Aug 2022 08:12  Phos  3.8     08-17  Mg     2.0     08-17    TPro  6.7  /  Alb  2.6<L>  /  TBili  0.3  /  DBili  0.2  /  AST  25  /  ALT  19  /  AlkPhos  295<H>  08-17          Culture - Blood (collected 08-14-22 @ 15:49)  Source: .Blood Blood-Peripheral  Preliminary Report (08-15-22 @ 21:02):    No growth to date.            REVIEW OF SYSTEMS  --------------------------------------------------------------------------------  unable to obtain full accurate ROS, but denies CP, SOB.    PHYSICAL EXAM:  Constitutional: awake, weak  Eyes: Anicteric  ENMT: nc/at  Respiratory: not tachypneic, non-labored on RA    Cardiovascular: normotensive, regular rate on monitor   Gastrointestinal: Soft, non distended, nontender, RLQ incisional/groin ecchymotic.   Genitourinary: anuric on HD   Extremities: SCD's in place and working bilaterally, overall with improving edema/anasarca  Vascular: Palpable dp pulses bilaterally  Neurological: AAOx2  Skin:  As noted above. No ulcerations or lesions  Musculoskeletal: Moving all extremities  Psychiatric: overall calm, following directions

## 2022-08-17 NOTE — PROGRESS NOTE ADULT - ATTENDING COMMENTS
66 year old male with PMH of  DM type II, HTN, CAD s/p CABG in 2020, Afib on Eliquis, CVA in 2019 due to Afib, Seizure d/o (last episode was on 4/8/22), h/o GI bleed in 2/2022 EGD with duodenal ulcer and ESRD on HD s/p R DDRT from DCD donor on 4/21/22 complicated by DGF requiring HD until 4/29/22, later had good graft function who was readmitted with status epilepticus in setting of UTI/antibiotics and sub-therapeutic valproic acid level.  Transferred to SICU on 7/25 with signs of sepsis. Biopsy from 7/29 demonstrating grade 2a rejection and is now dialysis dependent.    1. 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 No  DSA.   s/p pulse dose steroids. Thymo was not given to treat the rejection as he is too frail and at increased risk for infections. Now with failed allograft function, Remains anuric and is dialysis dependent.   Pt underwent IR guided HD catheter exchange on 8/8/22.  Has been receiving dialysis daily. Last HD today on 8/15   IHD scheduled for today     2. IS meds- on Sirolimus ,  mg po bid and steroid taper.   3. AMS , seizures - On Fosphenytoin. No further episodes of seizures since 8/3. Neurology following.  4. DM -  on Lantus and lispro.   5. Cellulitis over RLQ - On Vancomycin . CT A/P on 8/6 revealed a retroperitoneal hematoma. Txp USG 8/6  - Patent renal artery vasculature. Stable appearance the transplant kidney .Persistent elevated resistive indices. Diffuse abdominal wall edematous changes right lower quadrant without focal collection, cultures from 8/3  negative so far. Skin bx done on 8/8/22 showed no significant findings. ID follow up.  Improving.

## 2022-08-17 NOTE — PROGRESS NOTE ADULT - SUBJECTIVE AND OBJECTIVE BOX
[facility] NEPHROLOGY PRACTICE   MD NINA MASON MD, PA KRISTINE SOLTANPOUR, DO INJUNG KO, NP    TEL:  OFFICE: [phone]    From 5pm-7am Answering Service [phone]    -- RENAL FOLLOW UP NOTE ---Date of Service 08-17-22 @ 14:48    Patient is a 66y old  Male who presents with a chief complaint of Status Epilepticus (17 Aug 2022 14:13)      Patient seen and examined at bedside. No chest pain/sob    VITALS:  T(F): 98.5 (08-17-22 @ 13:00), Max: 99.4 (08-16-22 @ 21:00)  HR: 71 (08-17-22 @ 13:00)  BP: 148/62 (08-17-22 @ 13:00)  RR: 18 (08-17-22 @ 13:00)  SpO2: 96% (08-17-22 @ 13:00)  Wt(kg): --    08-16 @ 07:01  -  08-17 @ 07:00  --------------------------------------------------------  IN: 490 mL / OUT: 50 mL / NET: 440 mL          PHYSICAL EXAM:  Constitutional: NAD  Neck: No JVD  Respiratory: CTAB, no wheezes, rales or rhonchi  Cardiovascular: S1, S2, RRR  Gastrointestinal: BS+, soft, NT/ND  Extremities: No peripheral edema    Hospital Medications:   MEDICATIONS  (STANDING):  amLODIPine   Tablet 10 milliGRAM(s) Oral daily  brivaracetam  IVPB 100 milliGRAM(s) IV Intermittent once  chlorhexidine 2% Cloths 1 Application(s) Topical <User Schedule>  doxazosin 4 milliGRAM(s) Oral <User Schedule>  epoetin cortney-epbx (RETACRIT) Injectable 16157 Unit(s) SubCutaneous every 7 days  finasteride 5 milliGRAM(s) Oral daily  fosphenytoin IVPB 200 milliGRAM(s) PE IV Intermittent every 12 hours  heparin   Injectable 5000 Unit(s) SubCutaneous every 12 hours  hydrALAZINE 25 milliGRAM(s) Oral every 8 hours  insulin glargine Injectable (LANTUS) 8 Unit(s) SubCutaneous at bedtime  insulin lispro (ADMELOG) corrective regimen sliding scale   SubCutaneous three times a day before meals  insulin lispro (ADMELOG) corrective regimen sliding scale   SubCutaneous at bedtime  insulin lispro Injectable (ADMELOG) 8 Unit(s) SubCutaneous three times a day before meals  levothyroxine 50 MICROGram(s) Oral daily  melatonin 6 milliGRAM(s) Oral at bedtime  mycophenolate mofetil 250 milliGRAM(s) Oral <User Schedule>  nystatin    Suspension 757272 Unit(s) Oral four times a day  pantoprazole    Tablet 40 milliGRAM(s) Oral <User Schedule>  petrolatum white Ointment 1 Application(s) Topical two times a day  polyethylene glycol 3350 17 Gram(s) Oral daily  predniSONE   Tablet 10 milliGRAM(s) Oral daily  senna 2 Tablet(s) Oral at bedtime  sirolimus 5 milliGRAM(s) Oral <User Schedule>  trimethoprim   80 mG/sulfamethoxazole 400 mG 1 Tablet(s) Oral daily  valGANciclovir 450 milliGRAM(s) Oral <User Schedule>      LABS:  08-17    132<L>  |  92<L>  |  80<H>  ----------------------------<  126<H>  3.9   |  24  |  4.59<H>    Ca    8.9      17 Aug 2022 08:12  Phos  3.8     08-17  Mg     2.0     08-17    TPro  6.7  /  Alb  2.6<L>  /  TBili  0.3  /  DBili  0.2  /  AST  25  /  ALT  19  /  AlkPhos  295<H>  08-17    Creatinine Trend: 4.59 <--, 3.59 <--, 4.24 <--, 3.17 <--, 3.72 <--, 3.54 <--, 3.14 <--, 2.91 <--, 3.48 <--, 2.71 <--    Albumin, Serum: 2.6 g/dL (08-17 @ 08:12)  Phosphorus Level, Serum: 3.8 mg/dL (08-17 @ 08:12)                              7.4    12.02 )-----------( 315      ( 17 Aug 2022 08:12 )             24.0     Urine Studies:  Urinalysis - [08-16-22 @ 18:49]      Color Yellow / Appearance Slightly Turbid / SG 1.018 / pH 8.5      Gluc 200 mg/dL / Ketone Negative  / Bili Negative / Urobili Negative       Blood Large / Protein >600 / Leuk Est Moderate / Nitrite Positive      RBC 13 / WBC 40 / Hyaline 7 / Gran  / Sq Epi  / Non Sq Epi 4 / Bacteria Moderate      Iron 17, TIBC 171, %sat 10      [05-02-22 @ 13:03]  Ferritin 6336      [07-27-22 @ 13:00]  PTH -- (Ca 9.2)      [02-05-22 @ 10:13]   294  HbA1c 6.0      [02-21-20 @ 08:53]  TSH 4.69      [07-06-22 @ 18:36]  Lipid: chol --, , HDL --, LDL --      [07-27-22 @ 13:00]    HBsAg Nonreact      [07-25-22 @ 11:51]  HCV 0.18, Nonreact      [07-25-22 @ 11:51]      RADIOLOGY & ADDITIONAL STUDIES:

## 2022-08-17 NOTE — PROGRESS NOTE ADULT - SUBJECTIVE AND OBJECTIVE BOX
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 was seen and examined today. Overnight events noted.   Pt reports RUQ pain - intermittent. Denies N/V, fever, chills, diarrhea or constipation or dizziness. Last Hd done on 8/15/22.    PAST HISTORY  --------------------------------------------------------------------------------  No significant changes to PMH, PSH, FHx, SHx, unless otherwise noted    ALLERGIES & MEDICATIONS  --------------------------------------------------------------------------------  Allergies    No Known Allergies    Intolerances    Standing Inpatient Medications  amLODIPine   Tablet 10 milliGRAM(s) Oral daily  chlorhexidine 2% Cloths 1 Application(s) Topical <User Schedule>  doxazosin 4 milliGRAM(s) Oral <User Schedule>  epoetin cortney-epbx (RETACRIT) Injectable 16758 Unit(s) SubCutaneous every 7 days  finasteride 5 milliGRAM(s) Oral daily  fosphenytoin IVPB 200 milliGRAM(s) PE IV Intermittent every 12 hours  heparin   Injectable 5000 Unit(s) SubCutaneous every 12 hours  hydrALAZINE 25 milliGRAM(s) Oral every 8 hours  insulin glargine Injectable (LANTUS) 8 Unit(s) SubCutaneous at bedtime  insulin lispro (ADMELOG) corrective regimen sliding scale   SubCutaneous three times a day before meals  insulin lispro (ADMELOG) corrective regimen sliding scale   SubCutaneous at bedtime  insulin lispro Injectable (ADMELOG) 8 Unit(s) SubCutaneous three times a day before meals  levothyroxine 50 MICROGram(s) Oral daily  melatonin 6 milliGRAM(s) Oral at bedtime  mycophenolate mofetil 250 milliGRAM(s) Oral <User Schedule>  nystatin    Suspension 230163 Unit(s) Oral four times a day  pantoprazole    Tablet 40 milliGRAM(s) Oral <User Schedule>  petrolatum white Ointment 1 Application(s) Topical two times a day  polyethylene glycol 3350 17 Gram(s) Oral daily  predniSONE   Tablet 10 milliGRAM(s) Oral daily  senna 2 Tablet(s) Oral at bedtime  sirolimus 5 milliGRAM(s) Oral <User Schedule>  trimethoprim   80 mG/sulfamethoxazole 400 mG 1 Tablet(s) Oral daily  valGANciclovir 450 milliGRAM(s) Oral <User Schedule>    PRN Inpatient Medications  acetaminophen     Tablet .. 650 milliGRAM(s) Oral every 6 hours PRN  diphenhydrAMINE 25 milliGRAM(s) Oral at bedtime PRN    REVIEW OF SYSTEMS  --------------------------------------------------------------------------------  Gen: No fevers/chills  Respiratory: No dyspnea, cough,   CV: No chest pain, PND, orthopnea  GI: See HPI  Transplant: No pain  : No increased frequency, dysuria, hematuria   MSK: No edema  Neuro: No dizziness/lightheadedness    All other systems were reviewed and are negative, except as noted.    VITALS/PHYSICAL EXAM  --------------------------------------------------------------------------------  T(C): 36.7 (08-17-22 @ 09:00), Max: 37.4 (08-16-22 @ 13:01)  HR: 76 (08-17-22 @ 09:00) (74 - 84)  BP: 164/74 (08-17-22 @ 09:00) (151/56 - 193/87)  RR: 18 (08-17-22 @ 09:00) (18 - 18)  SpO2: 98% (08-17-22 @ 09:00) (96% - 98%)  Wt(kg): --    08-16-22 @ 07:01  -  08-17-22 @ 07:00  --------------------------------------------------------  IN: 490 mL / OUT: 50 mL / NET: 440 mL    Physical Exam:  	Gen: weak appearing elderly male  	HEENT: Anicteric  	Pulm: CTA B/L  	CV: S1S2+  	Abd: Soft, mild tenderness at RUQ region, +BS               Transplant site: RLQ non tender, surrounding erythema over the transplant site+,  	Ext: LE edema B/L+  	Neuro: Awake  	Skin: Warm and dry  	Dialysis access: Left non tunneled IJ catheter+, site clear, no bleeding noted    LABS/STUDIES  --------------------------------------------------------------------------------              7.4    12.02 >-----------<  315      [08-17-22 @ 08:12]              24.0     132  |  92  |  80  ----------------------------<  126      [08-17-22 @ 08:12]  3.9   |  24  |  4.59        Ca     8.9     [08-17-22 @ 08:12]      Mg     2.0     [08-17-22 @ 08:12]      Phos  3.8     [08-17-22 @ 08:12]    TPro  6.7  /  Alb  2.6  /  TBili  0.3  /  DBili  0.2  /  AST  25  /  ALT  19  /  AlkPhos  295  [08-17-22 @ 08:12]    PT/INR: PT 13.8 , INR 1.20       [08-16-22 @ 07:06]  PTT: 31.4       [08-17-22 @ 08:12]    Creatinine Trend:  SCr 4.59 [08-17 @ 08:12]  SCr 3.59 [08-16 @ 07:31]  SCr 4.24 [08-15 @ 05:41]  SCr 3.17 [08-14 @ 07:00]  SCr 3.72 [08-13 @ 07:06]    Sirolimus (Rapamycin) Level, Serum: 2.0 ng/mL (08-16 @ 07:05)  Sirolimus (Rapamycin) Level, Serum: 2.2 ng/mL (08-15 @ 05:47)  Sirolimus (Rapamycin) Level, Serum: 5.0 ng/mL (08-14 @ 07:03)  Sirolimus (Rapamycin) Level, Serum: 4.0 ng/mL (08-13 @ 07:03)    Urinalysis - [08-16-22 @ 18:49]      Color Yellow / Appearance Slightly Turbid / SG 1.018 / pH 8.5      Gluc 200 mg/dL / Ketone Negative  / Bili Negative / Urobili Negative       Blood Large / Protein >600 / Leuk Est Moderate / Nitrite Positive      RBC 13 / WBC 40 / Hyaline 7 / Gran  / Sq Epi  / Non Sq Epi 4 / Bacteria Moderate    Iron 17, TIBC 171, %sat 10      [05-02-22 @ 13:03]  Ferritin 6336      [07-27-22 @ 13:00]  PTH -- (Ca 9.2)      [02-05-22 @ 10:13]   294  HbA1c 6.0      [02-21-20 @ 08:53]  TSH 4.69      [07-06-22 @ 18:36]  Lipid: chol --, , HDL --, LDL --      [07-27-22 @ 13:00]    HBsAg Nonreact      [07-25-22 @ 11:51]  HCV 0.18, Nonreact      [07-25-22 @ 11:51]

## 2022-08-17 NOTE — PROGRESS NOTE ADULT - NS ATTEND AMEND GEN_ALL_CORE FT
neuro/epilepsy team input noted - seizure activity last night    right abdominal wall edema stable, not worsening  notes some tenderness right mid abdomen - not related to area of redness    Plan  per neuro, continue fosphenytoin, for addition of briviact  kidney graft - failed secondary to rejection, on sirolimus and cellcept 250mg bid, pred 20

## 2022-08-17 NOTE — PROGRESS NOTE ADULT - ASSESSMENT
66 year old male with PMH of  DM type II, HTN, CAD s/p CABG in 2020, Afib on Eliquis, CVA in 2019 due to Afib, Seizure d/o (last episode was on 4/8/22), h/o GI bleed in 2/2022 EGD with duodenal ulcer and ESRD on HD s/p R DDRT from [facility] donor on 4/21/22 complicated by DGF requiring HD until 4/29/22, later had good graft function who was readmitted with status epilepticus in setting of UTI/antibiotics and sub-therapeutic valproic acid level.  Transferred to SICU on 7/25 with signs of sepsis. 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 Received pulse steroids (Solumedrol 500 on 7/30 -> 250 on 7/31)

## 2022-08-17 NOTE — PROGRESS NOTE ADULT - SUBJECTIVE AND OBJECTIVE BOX
Follow Up:      Interval History:    REVIEW OF SYSTEMS  [  ] ROS unobtainable because:    [  ] All other systems negative except as noted below    Constitutional:  [ ] fever [ ] chills  [ ] weight loss  [ ] weakness  Skin:  [ ] rash [ ] phlebitis	  Eyes: [ ] icterus [ ] pain  [ ] discharge	  ENMT: [ ] sore throat  [ ] thrush [ ] ulcers [ ] exudates  Respiratory: [ ] dyspnea [ ] hemoptysis [ ] cough [ ] sputum	  Cardiovascular:  [ ] chest pain [ ] palpitations [ ] edema	  Gastrointestinal:  [ ] nausea [ ] vomiting [ ] diarrhea [ ] constipation [ ] pain	  Genitourinary:  [ ] dysuria [ ] frequency [ ] hematuria [ ] discharge [ ] flank pain  [ ] incontinence  Musculoskeletal:  [ ] myalgias [ ] arthralgias [ ] arthritis  [ ] back pain  Neurological:  [ ] headache [ ] seizures  [ ] confusion/altered mental status    Allergies  No Known Allergies        ANTIMICROBIALS:  nystatin    Suspension 397994 four times a day  trimethoprim   80 mG/sulfamethoxazole 400 mG 1 daily  valGANciclovir 450 <User Schedule>      OTHER MEDS:  MEDICATIONS  (STANDING):  acetaminophen     Tablet .. 650 every 6 hours PRN  amLODIPine   Tablet 10 daily  brivaracetam  IVPB 100 once  diphenhydrAMINE 25 at bedtime PRN  doxazosin 4 <User Schedule>  epoetin cortney-epbx (RETACRIT) Injectable 25832 every 7 days  finasteride 5 daily  fosphenytoin IVPB 200 every 12 hours  heparin   Injectable 5000 every 12 hours  hydrALAZINE 25 every 8 hours  insulin glargine Injectable (LANTUS) 8 at bedtime  insulin lispro (ADMELOG) corrective regimen sliding scale  three times a day before meals  insulin lispro (ADMELOG) corrective regimen sliding scale  at bedtime  insulin lispro Injectable (ADMELOG) 8 three times a day before meals  levothyroxine 50 daily  melatonin 6 at bedtime  mycophenolate mofetil 250 <User Schedule>  pantoprazole    Tablet 40 <User Schedule>  polyethylene glycol 3350 17 daily  predniSONE   Tablet 10 daily  senna 2 at bedtime  sirolimus 5 <User Schedule>      Vital Signs Last 24 Hrs  T(C): 36.9 (17 Aug 2022 13:00), Max: 37.4 (16 Aug 2022 21:00)  T(F): 98.5 (17 Aug 2022 13:00), Max: 99.4 (16 Aug 2022 21:00)  HR: 71 (17 Aug 2022 13:00) (71 - 84)  BP: 148/62 (17 Aug 2022 13:00) (148/62 - 193/87)  BP(mean): --  RR: 18 (17 Aug 2022 13:00) (18 - 18)  SpO2: 96% (17 Aug 2022 13:00) (96% - 98%)    Parameters below as of 17 Aug 2022 13:00  Patient On (Oxygen Delivery Method): room air        PHYSICAL EXAMINATION:  General: Alert and Awake, NAD  HEENT: PERRL, EOMI  Neck: Supple  Cardiac: RRR, No M/R/G  Resp: CTAB, No Wh/Rh/Ra  Abdomen: NBS, NT/ND, No HSM, No rigidity or guarding  MSK: No LE edema. No Calf tenderness  : No tucker  Skin: No rashes or lesions. Skin is warm and dry to the touch.   Neuro: Alert and Awake. CN 2-12 Grossly intact. Moves all four extremities spontaneously.  Psych: Calm, Pleasant, Cooperative                          7.4    12.02 )-----------( 315      ( 17 Aug 2022 08:12 )             24.0           132<L>  |  92<L>  |  80<H>  ----------------------------<  126<H>  3.9   |  24  |  4.59<H>    Ca    8.9      17 Aug 2022 08:12  Phos  3.8       Mg     2.0         TPro  6.7  /  Alb  2.6<L>  /  TBili  0.3  /  DBili  0.2  /  AST  25  /  ALT  19  /  AlkPhos  295<H>        Urinalysis Basic - ( 16 Aug 2022 18:49 )    Color: Yellow / Appearance: Slightly Turbid / S.018 / pH: x  Gluc: x / Ketone: Negative  / Bili: Negative / Urobili: Negative   Blood: x / Protein: >600 / Nitrite: Positive   Leuk Esterase: Moderate / RBC: 13 /hpf / WBC 40 /HPF   Sq Epi: x / Non Sq Epi: 4 / Bacteria: Moderate        MICROBIOLOGY:  Vancomycin Level, Random: 13.5 ug/mL (22 @ 08:12)  v  .Blood Blood-Peripheral  22   No growth to date.  --  --      .Blood Blood-Peripheral  22   No Growth Final  --  --      .Tissue Other  22   No growth  --    No polymorphonuclear cells seen seen per low power field  No organisms seen per oil power field      .Blood Blood-Peripheral  22   Growth in aerobic bottle: Staphylococcus hominis  Growth in aerobic bottle: Staphylococcus epidermidis  Coag Negative Staphylococcus  Single set isolate, possible contaminant. Contact  Microbiology if susceptibility testing clinically  indicated.  ***Blood Panel PCR results on this specimen are available  approximately 3 hours after the Gram stain result.***  Gram stain, PCR, and/or culture results may not always  correspond due to difference in methodologies.  ************************************************************  This PCR assay was performed by multiplex PCR. This  Assay tests for 66 bacterial and resistance gene targets.  Please refer to the [facility] test directory  at [email or web address] for details.  --  Blood Culture PCR      .Blood Blood  22   No Growth Final  --  --      .Blood Blood-Peripheral  22   No Growth Final  --  --      .Blood Blood-Peripheral  22   No Growth Final  --  --      .Blood Blood  22   No growth  --  --      Catheterized Catheterized  22   50,000 - 99,000 CFU/mL Klebsiella pneumoniae ESBL  --  Klebsiella pneumoniae ESBL      .Blood Blood  22   No Growth Final  --  --      .Blood Blood  22   No Growth Final  --  --                RADIOLOGY:    <The imaging below has been reviewed and visualized by me independently. Findings as detailed in report below> Follow Up:  abdominal wall rash    Interval History:  seizure overnight. noting pain at his abdominal wall erythema.     REVIEW OF SYSTEMS  [  ] ROS unobtainable because:    [ x ] All other systems negative except as noted below    Constitutional:  [ ] fever [ ] chills  [ ] weight loss  [ ] weakness  Skin:  [ x] rash [ ] phlebitis	  Eyes: [ ] icterus [ ] pain  [ ] discharge	  ENMT: [ ] sore throat  [ ] thrush [ ] ulcers [ ] exudates  Respiratory: [ ] dyspnea [ ] hemoptysis [ ] cough [ ] sputum	  Cardiovascular:  [ ] chest pain [ ] palpitations [ ] edema	  Gastrointestinal:  [ ] nausea [ ] vomiting [ ] diarrhea [ ] constipation [ ] pain	  Genitourinary:  [ ] dysuria [ ] frequency [ ] hematuria [ ] discharge [ ] flank pain  [ ] incontinence  Musculoskeletal:  [ ] myalgias [ ] arthralgias [ ] arthritis  [ ] back pain  Neurological:  [ ] headache [ ] seizures  [ ] confusion/altered mental status    Allergies  No Known Allergies        ANTIMICROBIALS:  nystatin    Suspension 056832 four times a day  trimethoprim   80 mG/sulfamethoxazole 400 mG 1 daily  valGANciclovir 450 <User Schedule>      OTHER MEDS:  MEDICATIONS  (STANDING):  acetaminophen     Tablet .. 650 every 6 hours PRN  amLODIPine   Tablet 10 daily  brivaracetam  IVPB 100 once  diphenhydrAMINE 25 at bedtime PRN  doxazosin 4 <User Schedule>  epoetin cortney-epbx (RETACRIT) Injectable 33553 every 7 days  finasteride 5 daily  fosphenytoin IVPB 200 every 12 hours  heparin   Injectable 5000 every 12 hours  hydrALAZINE 25 every 8 hours  insulin glargine Injectable (LANTUS) 8 at bedtime  insulin lispro (ADMELOG) corrective regimen sliding scale  three times a day before meals  insulin lispro (ADMELOG) corrective regimen sliding scale  at bedtime  insulin lispro Injectable (ADMELOG) 8 three times a day before meals  levothyroxine 50 daily  melatonin 6 at bedtime  mycophenolate mofetil 250 <User Schedule>  pantoprazole    Tablet 40 <User Schedule>  polyethylene glycol 3350 17 daily  predniSONE   Tablet 10 daily  senna 2 at bedtime  sirolimus 5 <User Schedule>      Vital Signs Last 24 Hrs  T(C): 36.9 (17 Aug 2022 13:00), Max: 37.4 (16 Aug 2022 21:00)  T(F): 98.5 (17 Aug 2022 13:00), Max: 99.4 (16 Aug 2022 21:00)  HR: 71 (17 Aug 2022 13:00) (71 - 84)  BP: 148/62 (17 Aug 2022 13:00) (148/62 - 193/87)  BP(mean): --  RR: 18 (17 Aug 2022 13:00) (18 - 18)  SpO2: 96% (17 Aug 2022 13:00) (96% - 98%)    Parameters below as of 17 Aug 2022 13:00  Patient On (Oxygen Delivery Method): room air    PHYSICAL EXAMINATION:  General: Alert and Awake, NAD  Cardiac: RRR, No M/R/G  Resp: CTAB, No Wh/Rh/Ra  Abdomen: RLQ with erythema and induration overlying the renal transplant site with extension into the groin.   MSK: No LE edema. No Calf tenderness  Skin: No rashes or lesions. Skin is warm and dry to the touch.   Neuro: Alert and Awake. CN 2-12 Grossly intact. Moves all four extremities spontaneously.  Psych: Calm, Pleasant, Cooperative                            7.4    12.02 )-----------( 315      ( 17 Aug 2022 08:12 )             24.0       08-17    132<L>  |  92<L>  |  80<H>  ----------------------------<  126<H>  3.9   |  24  |  4.59<H>    Ca    8.9      17 Aug 2022 08:12  Phos  3.8     08-  Mg     2.0         TPro  6.7  /  Alb  2.6<L>  /  TBili  0.3  /  DBili  0.2  /  AST  25  /  ALT  19  /  AlkPhos  295<H>  08-17      Urinalysis Basic - ( 16 Aug 2022 18:49 )    Color: Yellow / Appearance: Slightly Turbid / S.018 / pH: x  Gluc: x / Ketone: Negative  / Bili: Negative / Urobili: Negative   Blood: x / Protein: >600 / Nitrite: Positive   Leuk Esterase: Moderate / RBC: 13 /hpf / WBC 40 /HPF   Sq Epi: x / Non Sq Epi: 4 / Bacteria: Moderate        MICROBIOLOGY:  Vancomycin Level, Random: 13.5 ug/mL (22 @ 08:12)  v  .Blood Blood-Peripheral  22   No growth to date.  --  --      .Blood Blood-Peripheral  22   No Growth Final  --  --      .Tissue Other  22   No growth  --    No polymorphonuclear cells seen seen per low power field  No organisms seen per oil power field      .Blood Blood-Peripheral  22   Growth in aerobic bottle: Staphylococcus hominis  Growth in aerobic bottle: Staphylococcus epidermidis  Coag Negative Staphylococcus  Single set isolate, possible contaminant. Contact  Microbiology if susceptibility testing clinically  indicated.  ***Blood Panel PCR results on this specimen are available  approximately 3 hours after the Gram stain result.***  Gram stain, PCR, and/or culture results may not always  correspond due to difference in methodologies.  ************************************************************  This PCR assay was performed by multiplex PCR. This  Assay tests for 66 bacterial and resistance gene targets.  Please refer to the [facility] test directory  at [email or web address] for details.  --  Blood Culture PCR      .Blood Blood  22   No Growth Final  --  --      .Blood Blood-Peripheral  22   No Growth Final  --  --      .Blood Blood-Peripheral  22   No Growth Final  --  --      .Blood Blood  22   No growth  --  --      Catheterized Catheterized  22   50,000 - 99,000 CFU/mL Klebsiella pneumoniae ESBL  --  Klebsiella pneumoniae ESBL      .Blood Blood  22   No Growth Final  --  --      .Blood Blood  22   No Growth Final  --  --    RADIOLOGY:    <The imaging below has been reviewed and visualized by me independently. Findings as detailed in report below>    < from: [facility] Duplex Lower Extrem Arterial, Bilat (08.10.22 @ 13:09) >  Impression: Lower extremity arterial vascular disease is NOT identified.    < end of copied text >

## 2022-08-17 NOTE — PROGRESS NOTE ADULT - PROBLEM SELECTOR PLAN 5
Pt with B/L LE/UE swelling R>L, doppler studies negative of DVT. Continue Subq Heparin. Cannot use Eliquis given interaction with Dilantin. Edema greatly improving with fluid removal. Last HD done on 8/15/22. HD plan as outline above.

## 2022-08-18 NOTE — PROGRESS NOTE ADULT - SUBJECTIVE AND OBJECTIVE BOX
Neurology Progress Note    SUBJECTIVE/OBJECTIVE/INTERVAL EVENTS: Patient seen and examined at bedside w/ neuro attending and team. He was awake and alert and able to answer most questions.      VITALS & EXAMINATION:  Vital Signs Last 24 Hrs  T(C): 37.5 (18 Aug 2022 13:10), Max: 37.5 (18 Aug 2022 13:10)  T(F): 99.5 (18 Aug 2022 13:10), Max: 99.5 (18 Aug 2022 13:10)  HR: 71 (18 Aug 2022 13:10) (71 - 81)  BP: 160/71 (18 Aug 2022 13:10) (151/59 - 191/71)  BP(mean): 96 (18 Aug 2022 01:20) (96 - 99)  RR: 18 (18 Aug 2022 13:10) (18 - 20)  SpO2: 99% (18 Aug 2022 13:10) (95% - 99%)    Parameters below as of 18 Aug 2022 13:10  Patient On (Oxygen Delivery Method): room air    General appearance: does not appear to be in pain  Head: normocephalic  Eyes: clear sclera  Respiratory: breathing regularly    Neurologic exam:  MS - awake, alert, oriented to person, place partial date. Follows simple commands. Attend to stimuli from both sides.   CN - pupils equal round, EOMI, BTT b/l, mild R facial droop, cannot assess CNV, hearing intact to conversation b/l, shoulder shrug limited evaluation   Motor - Normal bulk. Inc tone in L side and normal tone in R side. Spontaneous movements of L > R side and at least antigravity. Proximal str LUE 4+, RUE 4, LLE 4-, RLE 3    Sens - LT/temp intact all extremities  Toes downgoing bilaterally   DTR's - 2+ BUE's, 3+ R KJ, 2+ L KJ, 1+ AJ b/l,  Gait and station - PERRY    LABORATORY:  CBC                       7.0    10.44 )-----------( 296      ( 18 Aug 2022 06:17 )             22.3     Chem 08-18    134<L>  |  94<L>  |  55<H>  ----------------------------<  95  3.8   |  26  |  3.50<H>    Ca    8.6      18 Aug 2022 06:20  Phos  3.3     08-  Mg     2.0     -    TPro  6.3  /  Alb  2.5<L>  /  TBili  0.3  /  DBili  0.2  /  AST  20  /  ALT  18  /  AlkPhos  271<H>  08-    LFTs LIVER FUNCTIONS - ( 18 Aug 2022 06:19 )  Alb: 2.5 g/dL / Pro: 6.3 g/dL / ALK PHOS: 271 U/L / ALT: 18 U/L / AST: 20 U/L / GGT: x           Coagulopathy PTT - ( 18 Aug 2022 06:19 )  PTT:31.4 sec  Lipid Panel  Chol -- LDL -- HDL -- Trig 118  A1c   Cardiac enzymes     U/A Urinalysis Basic - ( 16 Aug 2022 18:49 )    Color: Yellow / Appearance: Slightly Turbid / S.018 / pH: x  Gluc: x / Ketone: Negative  / Bili: Negative / Urobili: Negative   Blood: x / Protein: >600 / Nitrite: Positive   Leuk Esterase: Moderate / RBC: 13 /hpf / WBC 40 /HPF   Sq Epi: x / Non Sq Epi: 4 / Bacteria: Moderate    CSF  Immunological  Other    STUDIES & IMAGING: (EEG, CT, MR, U/S, TTE/DINAH):    EEG   Clinical Impression:  Abnormal EEG study   Left hemispheric dysfunction with increased risk for seizures from the left posterior quadrant  Mild to moderate diffuse cerebral dysfunction, not specific as to etiology.  No seizures since 22 at 20:22PM     Neurology Progress Note    SUBJECTIVE/OBJECTIVE/INTERVAL EVENTS: Patient seen and examined at bedside w/ neuro attending and team. He was awake and alert and able to answer most questions.    FHx: Non-contributory    ROS: All systems negative except as documented in Interval History    VITALS & EXAMINATION:  Vital Signs Last 24 Hrs  T(C): 37.5 (18 Aug 2022 13:10), Max: 37.5 (18 Aug 2022 13:10)  T(F): 99.5 (18 Aug 2022 13:10), Max: 99.5 (18 Aug 2022 13:10)  HR: 71 (18 Aug 2022 13:10) (71 - 81)  BP: 160/71 (18 Aug 2022 13:10) (151/59 - 191/71)  BP(mean): 96 (18 Aug 2022 01:20) (96 - 99)  RR: 18 (18 Aug 2022 13:10) (18 - 20)  SpO2: 99% (18 Aug 2022 13:10) (95% - 99%)    Parameters below as of 18 Aug 2022 13:10  Patient On (Oxygen Delivery Method): room air    General appearance: does not appear to be in pain  Head: normocephalic  Eyes: clear sclera  Respiratory: breathing regularly    Neurologic exam:  MS - awake, alert, oriented to person, place partial date. Follows simple commands. Attend to stimuli from both sides.   CN - pupils equal round, EOMI, BTT b/l, mild R facial droop, cannot assess CNV, hearing intact to conversation b/l, shoulder shrug limited evaluation   Motor - Normal bulk. Inc tone in L side and normal tone in R side. Spontaneous movements of L > R side and at least antigravity. Proximal str LUE 4+, RUE 4, LLE 4-, RLE 3    Sens - LT/temp intact all extremities  Toes downgoing bilaterally   DTR's - 2+ BUE's, 3+ R KJ, 2+ L KJ, 1+ AJ b/l,  Gait and station - PERRY    LABORATORY:  CBC                       7.0    10.44 )-----------( 296      ( 18 Aug 2022 06:17 )             22.3     Chem 08-18    134<L>  |  94<L>  |  55<H>  ----------------------------<  95  3.8   |  26  |  3.50<H>    Ca    8.6      18 Aug 2022 06:20  Phos  3.3     08-  Mg     2.0         TPro  6.3  /  Alb  2.5<L>  /  TBili  0.3  /  DBili  0.2  /  AST  20  /  ALT  18  /  AlkPhos  271<H>  08-    LFTs LIVER FUNCTIONS - ( 18 Aug 2022 06:19 )  Alb: 2.5 g/dL / Pro: 6.3 g/dL / ALK PHOS: 271 U/L / ALT: 18 U/L / AST: 20 U/L / GGT: x           Coagulopathy PTT - ( 18 Aug 2022 06:19 )  PTT:31.4 sec  Lipid Panel  Chol -- LDL -- HDL -- Trig 118  A1c   Cardiac enzymes     U/A Urinalysis Basic - ( 16 Aug 2022 18:49 )    Color: Yellow / Appearance: Slightly Turbid / S.018 / pH: x  Gluc: x / Ketone: Negative  / Bili: Negative / Urobili: Negative   Blood: x / Protein: >600 / Nitrite: Positive   Leuk Esterase: Moderate / RBC: 13 /hpf / WBC 40 /HPF   Sq Epi: x / Non Sq Epi: 4 / Bacteria: Moderate    CSF  Immunological  Other    STUDIES & IMAGING: (EEG, CT, MR, U/S, TTE/DINAH):    EEG   Clinical Impression:  Abnormal EEG study   Left hemispheric dysfunction with increased risk for seizures from the left posterior quadrant  Mild to moderate diffuse cerebral dysfunction, not specific as to etiology.  No seizures since 22 at 20:22PM

## 2022-08-18 NOTE — PROGRESS NOTE ADULT - SUBJECTIVE AND OBJECTIVE BOX
[facility] [place] [facility]   MD NINA MASON MD, DO SADIE RAMIREZ NP    TEL:  OFFICE: [phone]    From 5pm-7am Answering Service [phone]    -- RENAL FOLLOW UP NOTE ---Date of Service 08-18-22 @ 14:47    Patient is a 66y old  Male who presents with a chief complaint of Status Epilepticus (18 Aug 2022 14:30)      Patient seen and examined at bedside. No chest pain/sob    VITALS:  T(F): 99.5 (08-18-22 @ 13:10), Max: 99.5 (08-18-22 @ 13:10)  HR: 71 (08-18-22 @ 13:10)  BP: 160/71 (08-18-22 @ 13:10)  RR: 18 (08-18-22 @ 13:10)  SpO2: 99% (08-18-22 @ 13:10)  Wt(kg): --    08-17 @ 07:01  -  08-18 @ 07:00  --------------------------------------------------------  IN: 1100 mL / OUT: 2550 mL / NET: -1450 mL    08-18 @ 07:01  -  08-18 @ 14:47  --------------------------------------------------------  IN: 300 mL / OUT: 10 mL / NET: 290 mL          PHYSICAL EXAM:  Constitutional: NAD  Neck: No JVD  Respiratory: CTAB, no wheezes, rales or rhonchi  Cardiovascular: S1, S2, RRR  Gastrointestinal: BS+, soft, NT/ND  Extremities: No peripheral edema    Hospital Medications:   MEDICATIONS  (STANDING):  amLODIPine   Tablet 10 milliGRAM(s) Oral daily  brivaracetam 50 milliGRAM(s) Oral two times a day  brivaracetam  IVPB 50 milliGRAM(s) IV Intermittent once  chlorhexidine 2% Cloths 1 Application(s) Topical <User Schedule>  doxazosin 4 milliGRAM(s) Oral <User Schedule>  epoetin cortney-epbx (RETACRIT) Injectable 23381 Unit(s) IV Push every 7 days  finasteride 5 milliGRAM(s) Oral daily  heparin   Injectable 5000 Unit(s) SubCutaneous every 12 hours  hydrALAZINE 25 milliGRAM(s) Oral every 8 hours  insulin glargine Injectable (LANTUS) 8 Unit(s) SubCutaneous at bedtime  insulin lispro (ADMELOG) corrective regimen sliding scale   SubCutaneous three times a day before meals  insulin lispro (ADMELOG) corrective regimen sliding scale   SubCutaneous at bedtime  insulin lispro Injectable (ADMELOG) 8 Unit(s) SubCutaneous three times a day before meals  levothyroxine 50 MICROGram(s) Oral daily  melatonin 6 milliGRAM(s) Oral at bedtime  mycophenolate mofetil 250 milliGRAM(s) Oral <User Schedule>  nystatin    Suspension 179324 Unit(s) Oral four times a day  pantoprazole    Tablet 40 milliGRAM(s) Oral <User Schedule>  petrolatum white Ointment 1 Application(s) Topical two times a day  phenytoin   Capsule 200 milliGRAM(s) Oral two times a day  polyethylene glycol 3350 17 Gram(s) Oral daily  predniSONE   Tablet 10 milliGRAM(s) Oral daily  senna 2 Tablet(s) Oral at bedtime  sirolimus 5 milliGRAM(s) Oral <User Schedule>  trimethoprim   80 mG/sulfamethoxazole 400 mG 1 Tablet(s) Oral daily  valGANciclovir 450 milliGRAM(s) Oral <User Schedule>      LABS:  08-18    134<L>  |  94<L>  |  55<H>  ----------------------------<  95  3.8   |  26  |  3.50<H>    Ca    8.6      18 Aug 2022 06:20  Phos  3.3     08-18  Mg     2.0     08-18    TPro  6.3  /  Alb  2.5<L>  /  TBili  0.3  /  DBili  0.2  /  AST  20  /  ALT  18  /  AlkPhos  271<H>  08-18    Creatinine Trend: 3.50 <--, 4.59 <--, 3.59 <--, 4.24 <--, 3.17 <--, 3.72 <--, 3.54 <--, 3.14 <--, 2.91 <--    Albumin, Serum: 2.5 g/dL (08-18 @ 06:19)  Phosphorus Level, Serum: 3.3 mg/dL (08-18 @ 06:19)                              7.0    10.44 )-----------( 296      ( 18 Aug 2022 06:17 )             22.3     Urine Studies:  Urinalysis - [08-16-22 @ 18:49]      Color Yellow / Appearance Slightly Turbid / SG 1.018 / pH 8.5      Gluc 200 mg/dL / Ketone Negative  / Bili Negative / Urobili Negative       Blood Large / Protein >600 / Leuk Est Moderate / Nitrite Positive      RBC 13 / WBC 40 / Hyaline 7 / Gran  / Sq Epi  / Non Sq Epi 4 / Bacteria Moderate      Iron 17, TIBC 171, %sat 10      [05-02-22 @ 13:03]  Ferritin 6336      [07-27-22 @ 13:00]  PTH -- (Ca 9.2)      [02-05-22 @ 10:13]   294  HbA1c 6.0      [02-21-20 @ 08:53]  TSH 4.69      [07-06-22 @ 18:36]  Lipid: chol --, , HDL --, LDL --      [07-27-22 @ 13:00]    HBsAg Nonreact      [07-25-22 @ 11:51]  HCV 0.18, Nonreact      [07-25-22 @ 11:51]      RADIOLOGY & ADDITIONAL STUDIES:

## 2022-08-18 NOTE — PROVIDER CONTACT NOTE (OTHER) - ASSESSMENT
patient hypertensive. Denies s/s related to hypertension. Did not received 14:00 dosing of hydralazine due to dialysis and is due for finasteride now due to dialysis as well.

## 2022-08-18 NOTE — PROGRESS NOTE ADULT - NUTRITIONAL ASSESSMENT

## 2022-08-18 NOTE — PROGRESS NOTE ADULT - ATTENDING COMMENTS
66 year old male with PMH of  DM type II, HTN, CAD s/p CABG in 2020, Afib on Eliquis, CVA in 2019 due to Afib, Seizure d/o (last episode was on 4/8/22), h/o GI bleed in 2/2022 EGD with duodenal ulcer and ESRD on HD s/p R DDRT from DCD donor on 4/21/22 complicated by DGF requiring HD until 4/29/22, later had good graft function who was readmitted with status epilepticus in setting of UTI/antibiotics and sub-therapeutic valproic acid level.  Transferred to SICU on 7/25 with signs of sepsis. Biopsy from 7/29 demonstrating grade 2a rejection and is now dialysis dependent.    1. 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 No  DSA.   s/p pulse dose steroids. Thymo was not given to treat the rejection as he is too frail and at increased risk for infections. Now with failed allograft function, remains anuric and is dialysis dependent.   Patient  underwent IR guided HD catheter exchange on 8/8/22.  Has been receiving dialysis daily. Last HD today on 8/17  IUF today     2. IS meds- on Sirolimus ,  mg po bid and steroid taper.   3. AMS , seizures - On Fosphenytoin. No further episodes of seizures since 8/3. Neurology following.  4. DM -  on Lantus and lispro.   5. Cellulitis over RLQ - On Vancomycin . CT A/P on 8/6 revealed a retroperitoneal hematoma. Txp USG 8/6  - Patent renal artery vasculature. Stable appearance the transplant kidney .Persistent elevated resistive indices. Diffuse abdominal wall edematous changes right lower quadrant without focal collection, cultures from 8/3  negative so far. Skin bx done on 8/8/22 showed no significant findings. ID follow up.  Improving.

## 2022-08-18 NOTE — PROGRESS NOTE ADULT - ASSESSMENT
66M with h/o DM type II, HTN, CAD s/p CABG in 2020, Afib on Eliquis, CVA in 2019 due to Afib, Seizure d/o (last episode was on 4/8/22), h/o GI bleed in 2/2022 EGD with duodenal ulcer and ESRD on HD s/p R DDRT from DCD donor on 4/21/22 complicated by DGF requiring HD until 4/29/22 who presented with status epilepticus in setting of UTI/antibiotics and sub-therapeutic valproic acid level. Transferred to SICU on 7/25 with signs of sepsis, now with refractory seizures    Seizure Disorder:  - Neuro/Epilepsy Teams following. Avoid Fycompa 2/2 agitation in past  - 1 min seizure with clonic movements noted on EEG 8/16.   - Continue additional anti-seizure medication Briviact 50mg BID with additional 50mg post each HD session  - No further seizures noted.  DC EEG today  - Change IV Fosphenytoin to Phenytoin 200mg po BID. FU phenytoin level daily  - prior MRI head 6/27 with less concerns for PRES, likely ischemic changes  - o/n CTH 8/3 with no acute findings  - replete lytes aggressively      R DCD DDRT 4/21/22 c/b 2A ACR  - 2A ACR: s/p pulse steroids. Continue Prednisone 10mg QD  - Deferring right psoas muscle/fascia biopsy as wound is not worsening  - HD/UF daily as per nephrology for fluid overload/anuria  - Scrotal support  - DC Vancomycin s/p 14 day course  - 8/14 surveillance BCx negative  - graft doppler stable, stable perinephric collection last US 8/5 and CT scan 8/8  - S/P removal of ureteral stent on 7/12  - Continue Doxazosin/Proscar for BPH  - OOB with RN/PT  - DSA negative   - Renal Soft diet as tolerated with PO supplements  - 8/8 Punch biopsy wound Culture: ngtd. Negative for PTLD  - DVT ppx with SQH  - fungitel negative  - LE studies WNL  - Benadryl PRN for sleep  - Daily CRP    Immunosuppression:   - Belatacept: 6/23-8/1 discontinued  - on Sirolimus as of  8/2 daily based on level, Cellcept 250 BID, Pred 20mg QD  - PPX:  Nystatin, bactrim, valcyte (MWF)    DM  - controlled  - Fingerstick ac and qhs  - Continue Lantus 6u qpm and premeal Humalog with SSI  - Endo following    AFib/R IJ DVT   - Eliquis with ~40% less efficacy while on fosphenytoin.    - Lovenox held for ANA and transaminitis   - rate controlled  - will discuss A/C plans daily    HTN:  - Amlodipine/Doxazosin/Hydralazline  - off Coreg due to bradycardia

## 2022-08-18 NOTE — PROVIDER CONTACT NOTE (MEDICATION) - REASON
PO hydralazine held for dialysis, finestaride pushed back, and pre-meal insulin held as patient does not want to eat
Multiple blood sugars in 200s

## 2022-08-18 NOTE — PROVIDER CONTACT NOTE (MEDICATION) - ASSESSMENT
PO hydralazine held for dialysis, finestaride pushed back, and pre-meal insulin held as patient does not want to eat. Patient is getting dialysis at this time, refused to eat lunch with son. Per dialysis hold BP medications.
Pt multiple blood sugars over 200. Last 3 readings: 227, 279, 230. 5u Lantus given during day shift and 8u given prior to dinner

## 2022-08-18 NOTE — PROGRESS NOTE ADULT - SUBJECTIVE AND OBJECTIVE BOX
[facility] DIVISION OF KIDNEY DISEASES AND HYPERTENSION -- FOLLOW UP NOTE  --------------------------------------------------------------------------------  Chief Complaint: 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 was seen and examined today. Overnight events noted.   Pt reports RUQ pain - intermittent. Denies N/V, fever, chills, diarrhea or constipation or dizziness. Last Hd done on 8/17/22. Tolerated HD well.     PAST HISTORY  --------------------------------------------------------------------------------  No significant changes to PMH, PSH, FHx, SHx, unless otherwise noted    ALLERGIES & MEDICATIONS  --------------------------------------------------------------------------------  Allergies    No Known Allergies    Intolerances    Standing Inpatient Medications  amLODIPine   Tablet 10 milliGRAM(s) Oral daily  brivaracetam  IVPB 50 milliGRAM(s) IV Intermittent two times a day  chlorhexidine 2% Cloths 1 Application(s) Topical <User Schedule>  doxazosin 4 milliGRAM(s) Oral <User Schedule>  epoetin cortney-epbx (RETACRIT) Injectable 82470 Unit(s) SubCutaneous every 7 days  finasteride 5 milliGRAM(s) Oral daily  fosphenytoin IVPB 200 milliGRAM(s) PE IV Intermittent every 12 hours  heparin   Injectable 5000 Unit(s) SubCutaneous every 12 hours  hydrALAZINE 25 milliGRAM(s) Oral every 8 hours  insulin glargine Injectable (LANTUS) 8 Unit(s) SubCutaneous at bedtime  insulin lispro (ADMELOG) corrective regimen sliding scale   SubCutaneous three times a day before meals  insulin lispro (ADMELOG) corrective regimen sliding scale   SubCutaneous at bedtime  insulin lispro Injectable (ADMELOG) 8 Unit(s) SubCutaneous three times a day before meals  levothyroxine 50 MICROGram(s) Oral daily  melatonin 6 milliGRAM(s) Oral at bedtime  mycophenolate mofetil 250 milliGRAM(s) Oral <User Schedule>  nystatin    Suspension 828589 Unit(s) Oral four times a day  pantoprazole    Tablet 40 milliGRAM(s) Oral <User Schedule>  petrolatum white Ointment 1 Application(s) Topical two times a day  polyethylene glycol 3350 17 Gram(s) Oral daily  predniSONE   Tablet 10 milliGRAM(s) Oral daily  senna 2 Tablet(s) Oral at bedtime  sirolimus 5 milliGRAM(s) Oral <User Schedule>  trimethoprim   80 mG/sulfamethoxazole 400 mG 1 Tablet(s) Oral daily  valGANciclovir 450 milliGRAM(s) Oral <User Schedule>    PRN Inpatient Medications  acetaminophen     Tablet .. 650 milliGRAM(s) Oral every 6 hours PRN  diphenhydrAMINE 25 milliGRAM(s) Oral at bedtime PRN    REVIEW OF SYSTEMS  --------------------------------------------------------------------------------  Gen: No fevers/chills  Respiratory: No dyspnea, cough,   CV: No chest pain, PND, orthopnea  GI: No abdominal pain, diarrhea, constipation, nausea, vomiting  Transplant: No pain  : No increased frequency, dysuria, hematuria   MSK: No edema  Neuro: No dizziness/lightheadedness    All other systems were reviewed and are negative, except as noted.    VITALS/PHYSICAL EXAM  --------------------------------------------------------------------------------  T(C): 36.8 (08-18-22 @ 09:00), Max: 37.2 (08-17-22 @ 16:50)  HR: 75 (08-18-22 @ 09:00) (71 - 81)  BP: 164/71 (08-18-22 @ 09:00) (148/62 - 191/71)  RR: 18 (08-18-22 @ 09:00) (18 - 20)  SpO2: 98% (08-18-22 @ 09:00) (95% - 99%)  Wt(kg): --        08-17-22 @ 07:01  -  08-18-22 @ 07:00  --------------------------------------------------------  IN: 1100 mL / OUT: 2550 mL / NET: -1450 mL    08-18-22 @ 07:01  -  08-18-22 @ 10:17  --------------------------------------------------------  IN: 100 mL / OUT: 0 mL / NET: 100 mL      Physical Exam:  	Gen: NAD, able to speak in full sentences   	HEENT: PERRL, MMM   	Pulm: CTA B/L, no crackles   	CV: RRR, S1S2+  	Abd: +BS, soft          Transplant: No tenderness, swelling  	: No suprapubic tenderness  	MSK: no edema   	Psych: Normal affect and mood  	Skin: Warm          Access:    LABS/STUDIES  --------------------------------------------------------------------------------              7.0    10.44 >-----------<  296      [08-18-22 @ 06:17]              22.3     134  |  94  |  55  ----------------------------<  95      [08-18-22 @ 06:20]  3.8   |  26  |  3.50        Ca     8.6     [08-18-22 @ 06:20]      Mg     2.0     [08-18-22 @ 06:19]      Phos  3.3     [08-18-22 @ 06:19]    TPro  6.3  /  Alb  2.5  /  TBili  0.3  /  DBili  0.2  /  AST  20  /  ALT  18  /  AlkPhos  271  [08-18-22 @ 06:19]      PTT: 31.4       [08-18-22 @ 06:19]      Creatinine Trend:  SCr 3.50 [08-18 @ 06:20]  SCr 4.59 [08-17 @ 08:12]  SCr 3.59 [08-16 @ 07:31]  SCr 4.24 [08-15 @ 05:41]  SCr 3.17 [08-14 @ 07:00]        Sirolimus (Rapamycin) Level, Serum: 2.8 ng/mL (08-17 @ 07:59)  Sirolimus (Rapamycin) Level, Serum: 2.0 ng/mL (08-16 @ 07:05)  Sirolimus (Rapamycin) Level, Serum: 2.2 ng/mL (08-15 @ 05:47)  Sirolimus (Rapamycin) Level, Serum: 5.0 ng/mL (08-14 @ 07:03)        Urinalysis - [08-16-22 @ 18:49]      Color Yellow / Appearance Slightly Turbid / SG 1.018 / pH 8.5      Gluc 200 mg/dL / Ketone Negative  / Bili Negative / Urobili Negative       Blood Large / Protein >600 / Leuk Est Moderate / Nitrite Positive      RBC 13 / WBC 40 / Hyaline 7 / Gran  / Sq Epi  / Non Sq Epi 4 / Bacteria Moderate      Iron 17, TIBC 171, %sat 10      [05-02-22 @ 13:03]  Ferritin 6336      [07-27-22 @ 13:00]  PTH -- (Ca 9.2)      [02-05-22 @ 10:13]   294  HbA1c 6.0      [02-21-20 @ 08:53]  TSH 4.69      [07-06-22 @ 18:36]  Lipid: chol --, , HDL --, LDL --      [07-27-22 @ 13:00]    HBsAg Nonreact      [07-25-22 @ 11:51]  HCV 0.18, Nonreact      [07-25-22 @ 11:51]

## 2022-08-18 NOTE — CHART NOTE - NSCHARTNOTEFT_GEN_A_CORE
Age: 66y    Gender: Male    POCT Blood Glucose:  238 mg/dL (08-18-22 @ 12:43)  81 mg/dL (08-18-22 @ 08:50)  122 mg/dL (08-17-22 @ 22:06)  138 mg/dL (08-17-22 @ 17:36)      eMAR:  insulin glargine Injectable (LANTUS)   8 Unit(s) SubCutaneous (08-17-22 @ 22:14)    insulin lispro (ADMELOG) corrective regimen sliding scale   2 Unit(s) SubCutaneous (08-18-22 @ 13:24)    insulin lispro Injectable (ADMELOG)   8 Unit(s) SubCutaneous (08-17-22 @ 17:48)    insulin lispro Injectable (ADMELOG).   2 Unit(s) SubCutaneous (08-18-22 @ 09:43)    levothyroxine   50 MICROGram(s) Oral (08-18-22 @ 05:45)    predniSONE   Tablet   10 milliGRAM(s) Oral (08-18-22 @ 05:44)     POC glucose, insulin requirements, lab values reviewed.     prednisone tapered 8/16 to pred 10mg qd    fbg now tightly controlled, lower lantus to 6 units sq qhs    received 2units admelog w/ breakfast this am per team w/ [facility] bg above goal .     would continue w/ admelog 8 units sq tid ac qhs for now as prandial BG at goal; if BG <120 can reduce to 4 units admelog x1    c/w LDSS TID AC and LDSS qHS    d/w diane MUNOZ transplant Age: 66y    Gender: Male    POCT Blood Glucose:  238 mg/dL (08-18-22 @ 12:43)  81 mg/dL (08-18-22 @ 08:50)  122 mg/dL (08-17-22 @ 22:06)  138 mg/dL (08-17-22 @ 17:36)      eMAR:  insulin glargine Injectable (LANTUS)   8 Unit(s) SubCutaneous (08-17-22 @ 22:14)    insulin lispro (ADMELOG) corrective regimen sliding scale   2 Unit(s) SubCutaneous (08-18-22 @ 13:24)    insulin lispro Injectable (ADMELOG)   8 Unit(s) SubCutaneous (08-17-22 @ 17:48)    insulin lispro Injectable (ADMELOG).   2 Unit(s) SubCutaneous (08-18-22 @ 09:43)    levothyroxine   50 MICROGram(s) Oral (08-18-22 @ 05:45)    predniSONE   Tablet   10 milliGRAM(s) Oral (08-18-22 @ 05:44)     POC glucose, insulin requirements, lab values reviewed.     prednisone tapered 8/16 to pred 10mg qd    fbg now tightly controlled, lower lantus to 6 units sq qhs    received 2units admelog w/ breakfast this am per team w/ [facility] bg above goal .     would continue w/ admelog 8 units sq tid ac qhs for now as prandial BG at goal; if BG <120 can reduce to 4 units admelog x1; if pt not eating hold the prandial insulin     c/w LDSS TID AC and LDSS qHS    d/w diane MUNOZ transplant

## 2022-08-18 NOTE — EEG REPORT - NS EEG TEXT BOX
DUNBARCHAD [id number]     Study Date: 08-17-22 0800 to -0800 8/18/22  Study hours: 22:58 Hrs  EEG disconnected 11:10 to 11:47 AM     --------------------------------------------------------------------------------------------------  History:  CC/ HPI Patient is a 66y old  Male who presents with a chief complaint of Status Epilepticus (04 Aug 2022 12:41)    MEDICATIONS  (STANDING):  chlorhexidine 2% Cloths 1 Application(s) Topical <User Schedule>  doxazosin 4 milliGRAM(s) Oral <User Schedule>  epoetin cortney-epbx (RETACRIT) Injectable 29170 Unit(s) SubCutaneous every 7 days  fosphenytoin IVPB 200 milliGRAM(s) PE IV Intermittent every 12 hours  heparin   Injectable 5000 Unit(s) SubCutaneous every 8 hours  hydrALAZINE 25 milliGRAM(s) Oral every 8 hours  insulin lispro (ADMELOG) corrective regimen sliding scale   SubCutaneous every 6 hours  melatonin 6 milliGRAM(s) Oral at bedtime  mycophenolate mofetil Suspension 1000 milliGRAM(s) Enteral Tube <User Schedule>  pantoprazole  Injectable 40 milliGRAM(s) IV Push daily  senna Syrup 10 milliLiter(s) Oral at bedtime  sodium bicarbonate 1300 milliGRAM(s) Oral <User Schedule>    --------------------------------------------------------------------------------------------------  Study Interpretation:    [Abbreviation Key:  PDR=alpha rhythm/posterior dominant rhythm. A-P=anterior posterior.  Amplitude: ‘very low’:<20; ‘low’:20-49; ‘medium’:; ‘high’:>150uV.  Persistence for periodic/rhythmic patterns (% of epoch) ‘rare’:<1%; ‘occasional’:1-10%; ‘frequent’:10-50%; ‘abundant’:50-90%; ‘continuous’:>90%.  Persistence for sporadic discharges: ‘rare’:<1/hr; ‘occasional’:1/min-1/hr; ‘frequent’:>1/min; ‘abundant’:>1/10 sec.  RPP=rhythmic and periodic patterns; GRDA=generalized rhythmic delta activity; FIRDA=frontal intermittent GRDA; LRDA=lateralized rhythmic delta activity; TIRDA=temporal intermittent rhythmic delta activity;  LPD=PLED=lateralized periodic discharges; GPD=generalized periodic discharges; BIPDs =bilateral independent periodic discharges; Mf=multifocal; SIRPDs=stimulus induced rhythmic, periodic, or ictal appearing discharges; BIRDs=brief potentially ictal rhythmic discharges >4 Hz, lasting .5-10s; PFA (paroxysmal bursts >13 Hz or =8 Hz <10s).  Modifiers: +F=with fast component; +S=with spike component; +R=with rhythmic component.  S-B=burst suppression pattern.  Max=maximal. N1-drowsy; N2-stage II sleep; N3-slow wave sleep. SSS/BETS=small sharp spikes/benign epileptiform transients of sleep. HV=hyperventilation; PS=photic stimulation]    Daily EEG Visual Analysis  FINDINGS:      Background:  Continuous: continuous  Symmetry: asymmetric  PDR: 7.5 Hz during maximal wakefulness, that attenuates to eye opening      Reactivity: present  Voltage: normal (between 20-150uV)  Anterior Posterior Gradient: present  Other background findings: none  Breach: absent    Background Slowing:  Generalized slowing: polymorphic theta and delta slowing   Focal slowing: continuous left hemispheric theta and delta slowing    State Changes:   -Drowsiness noted with increased slowing, attenuation of fast activity, vertex transients.  -Present N2 sleep transients    Sporadic Epileptiform Discharges:    Frequent sharp waves over the left posterior quadrant      Electrographic and Electroclinical seizures:  None     Other Clinical Events:  None    Activation Procedures:   -Hyperventilation was not performed.    -Photic stimulation was not performed.     Artifacts:  Intermittent myogenic and movement artifacts were noted.  Significant movement artifact in the initial portions of the recording [id number], and subsequently 00:49-01:40, 02:05-03:19    ECG:  The heart rate on single channel ECG was predominantly 70 BPM.    EEG Classification / Summary:  Abnormal EEG study  Frequent sharp waves over the left posterior quadrant    Mild to moderate focal left centroparietal slowing   Mild to moderate generalized background slowing    -----------------------------------------------------------------------------------------------------  Clinical Impression:  Abnormal EEG study   Left hemispheric dysfunction with increased risk for seizures from the left posterior quadrant  Mild to moderate diffuse cerebral dysfunction, not specific as to etiology.  No seizures since 8/16/22 at 20:22PM    Preliminary report     Robert Stokes MD  Epilepsy Fellow , [facility]  Department of Neurology, [facility]    [facility] EEG Reading Room Ph#: [phone]  Epilepsy Answering Service after 5PM and before 8:30AM: Ph#: [phone]   DUNBARCHAD [id number]     Study Date: 08-17-22 0800 to -0800 8/18/22  Study hours: 22:58 Hrs  EEG disconnected 11:10 to 11:47 AM     --------------------------------------------------------------------------------------------------  History:  CC/ HPI Patient is a 66y old  Male who presents with a chief complaint of Status Epilepticus (04 Aug 2022 12:41)    MEDICATIONS  (STANDING):  chlorhexidine 2% Cloths 1 Application(s) Topical <User Schedule>  doxazosin 4 milliGRAM(s) Oral <User Schedule>  epoetin cortney-epbx (RETACRIT) Injectable 21081 Unit(s) SubCutaneous every 7 days  fosphenytoin IVPB 200 milliGRAM(s) PE IV Intermittent every 12 hours  heparin   Injectable 5000 Unit(s) SubCutaneous every 8 hours  hydrALAZINE 25 milliGRAM(s) Oral every 8 hours  insulin lispro (ADMELOG) corrective regimen sliding scale   SubCutaneous every 6 hours  melatonin 6 milliGRAM(s) Oral at bedtime  mycophenolate mofetil Suspension 1000 milliGRAM(s) Enteral Tube <User Schedule>  pantoprazole  Injectable 40 milliGRAM(s) IV Push daily  senna Syrup 10 milliLiter(s) Oral at bedtime  sodium bicarbonate 1300 milliGRAM(s) Oral <User Schedule>    --------------------------------------------------------------------------------------------------  Study Interpretation:    [Abbreviation Key:  PDR=alpha rhythm/posterior dominant rhythm. A-P=anterior posterior.  Amplitude: ‘very low’:<20; ‘low’:20-49; ‘medium’:; ‘high’:>150uV.  Persistence for periodic/rhythmic patterns (% of epoch) ‘rare’:<1%; ‘occasional’:1-10%; ‘frequent’:10-50%; ‘abundant’:50-90%; ‘continuous’:>90%.  Persistence for sporadic discharges: ‘rare’:<1/hr; ‘occasional’:1/min-1/hr; ‘frequent’:>1/min; ‘abundant’:>1/10 sec.  RPP=rhythmic and periodic patterns; GRDA=generalized rhythmic delta activity; FIRDA=frontal intermittent GRDA; LRDA=lateralized rhythmic delta activity; TIRDA=temporal intermittent rhythmic delta activity;  LPD=PLED=lateralized periodic discharges; GPD=generalized periodic discharges; BIPDs =bilateral independent periodic discharges; Mf=multifocal; SIRPDs=stimulus induced rhythmic, periodic, or ictal appearing discharges; BIRDs=brief potentially ictal rhythmic discharges >4 Hz, lasting .5-10s; PFA (paroxysmal bursts >13 Hz or =8 Hz <10s).  Modifiers: +F=with fast component; +S=with spike component; +R=with rhythmic component.  S-B=burst suppression pattern.  Max=maximal. N1-drowsy; N2-stage II sleep; N3-slow wave sleep. SSS/BETS=small sharp spikes/benign epileptiform transients of sleep. HV=hyperventilation; PS=photic stimulation]    Daily EEG Visual Analysis  FINDINGS:      Background:  Continuous: continuous  Symmetry: asymmetric  PDR: 7.5 Hz during maximal wakefulness, that attenuates to eye opening      Reactivity: present  Voltage: normal (between 20-150uV)  Anterior Posterior Gradient: present  Other background findings: none  Breach: absent    Background Slowing:  Generalized slowing: polymorphic theta and delta slowing   Focal slowing: continuous left hemispheric theta and delta slowing    State Changes:   -Drowsiness noted with increased slowing, attenuation of fast activity, vertex transients.  -Present N2 sleep transients    Sporadic Epileptiform Discharges:    Frequent sharp waves over the left posterior quadrant      Electrographic and Electroclinical seizures:  None     Other Clinical Events:  None    Activation Procedures:   -Hyperventilation was not performed.    -Photic stimulation was not performed.     Artifacts:  Intermittent myogenic and movement artifacts were noted.  Significant movement artifact in the initial portions of the recording [id number], and subsequently 00:49-01:40, 02:05-03:19    ECG:  The heart rate on single channel ECG was predominantly 70 BPM.    EEG Classification / Summary:  Abnormal EEG study  Frequent sharp waves over the left posterior quadrant    Mild to moderate focal left centroparietal slowing   Mild to moderate generalized background slowing    -----------------------------------------------------------------------------------------------------  Clinical Impression:  Abnormal EEG study   Left hemispheric dysfunction with increased risk for seizures from the left posterior quadrant  Mild to moderate diffuse cerebral dysfunction, not specific as to etiology.  No seizures since 8/16/22 at 20:22PM    Robert Stokes MD  Epilepsy Fellow , [facility]  Department of Neurology, [facility]    Gurwinder Carpenter MD  EEG/Epilepsy Attending     [facility] EEG Reading Room Ph#: [phone]  Epilepsy Answering Service after 5PM and before 8:30AM: Ph#: [phone]

## 2022-08-18 NOTE — PROGRESS NOTE ADULT - ATTENDING COMMENTS
Interval History as per resident note, personally verified by me. Patient without further seizures reported. wants head wrap off. Less somnolent today and no further episodes of perseveration. No additional focal neurologic deficits noted.    Focused neurologic exam:  MS - Awake and somnolent, AO x 2.75 (did not know day of the month but knew rest of date), speech bradyphrenic  and mild dysarthria but otherwise fluent; had episode of 15-30 seconds of perseveration which resolved, follows simple commands. Rep/naming, attn/conc, recent and remote memory, fund of knowledge dec  CN - PERRL, EOMI, VFF, face sens/str intact b/l except for R NLFF, hearing intact to conversation b/l, SCM at least 4+/5 b/l and symmetric, tongue/palate midline  Motor - Normal bulk. Inc tone in L side (paratonic?) and normal tone in R side. RUE 4+/5, LUE 5/5, RLE 4/5, LLE 4+/5  Sens - LT/temp intact all, as above  DTR's - 2+ BUE's, 3+ R KJ, 2+ L KJ, 1+ AJ b/l, and downgoing b/l plantar response  Coord - Grossly appropriate for level of strength  Gait and station - Due to fall risk/safety concerns did not assess    Phenytoin level (8/12) - 4.6 (corrected 7.7)    A/P:  Epilepsy, intractable, with resolved status epilepticus  Encephalopathy  S/p renal transplant, ANA  Prior strokes  Atrial fibrillation  CAD  HTN  DM type 2  Transaminitis  Hyponatremia (Na 134)  Leukocytosis  UTI    - Patient with new episodes of perseveration concerning for non-convulsive seizures. Otherwise neurologically improved  - vEEG without focal slowing, epileptiform discharges, and seizures. STOP  - Fosphenytoin 200mg IV BID, new level stable. Can convert to phenytoin 200mg PO BID once able to swallow  - As additional seizure started Briviact and now on maintenance with 50mg IV/PO [id number] TONIGHT and additional 50mg dose s/p HD. If no further seizures by next week (8/22) would then consider tapering fosphenytoin/phenytoin to off  - Discussed restarting Eliquis for secondary stroke prevention with family and kidney transplant service. As this interacts with phenytoin would not be as ideal as it would be at lower efficacy. Also not optimal to change AED's given patient has experienced a number of side effects and he has a lower threshold for further seizures. Best options would be either Lovenox or warfarin (adjust to goal INR 2-3) long term. Family and other medical teams are in agreement  - Recent MRI brain w/ and w/o without clear evidence of infection, inflammation, or acute CNS event (possibly minimal enhancement). 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 May consider repeat MRI brain if seizures recur given high blood pressure but less likely  - Continue to address above medical problems, as you are doing  - Will continue to follow patient with you

## 2022-08-18 NOTE — PROGRESS NOTE ADULT - ASSESSMENT

## 2022-08-18 NOTE — PROGRESS NOTE ADULT - SUBJECTIVE AND OBJECTIVE BOX
Transplant Surgery - Multidisciplinary Progress Note  --------------------------------------------------------------  DDRT     4/21/2022             Present:  Patient seen with multidisciplinary team including Transplant Surgeon: Dr. Barber, Nephrologist: Dr. JONO Lomeli, WALDO Garcia, resident Melecio Herring. Disciplines not in attendance will be notified of the plan.      66M with PMH: DM type II, HTN, CAD s/p CABG in 2020, AFib on Eliquis, CVA in 2019 due to Afib, Seizure d/o following CVA, last episode was on 4/8/22, h/o GIB in 2/2022 EGD with duodenal ulcer.  ESRD due to DM was on HD since 2019.     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  Donor was 58 , KDPI 82%, DCD, single vessels and ureter, HLA mismatch 1, 2, 2. No DSA, cPRA 0%. 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 Re-admitted in May for anemia in the setting of large perinephric hematoma s/p evacuation and repair of arterial anastomosis on 5/2/22. Intra operative biopsy showed no rejection, Creatinine ranging ~2mg/dL.    In Rehab:  He was recently dx with klebsiella UTI with Ertapenem - then switched to Levaquin    He also had parainfluenza pneumonia. Was at rehab progressing well.      Hospital course:  - 6/10: transferred from rehab facility for seizure status epilepticus. Intubated for respiratory protection and transferred to MICU.  EEG showed no seizure activity. Neuro consulted.   - 6/11: Extubated. Found to have R pleural effusion. s/p Thoracentesis 1.3L. Eliquis changed to heparin drip.  Post procedure drop in H&H. 5u PRBC, 2 u FFP.   - 6/11 evening: Transferred to SICU from MICU for further care in setting of hypoxia, significant R pleural effusion, anemia and hemodynamic instability  - 6/11 Intubated at 9pm and sedated on Precedex.  CT placed, 600ml blood drained.  1L total overnight, started pressors  - 6/11 Received Protamine for PTT >100 (was on Heparin drip started for AF), 2 u FFP and 5u PRBC total  - 6/13 s/p VATS 2.2L old blood removed; second chest tube placed  - 6/18-19: chest tubes removed  - 6/21: ?PRES vs. chronic ischemic changes on MRI, off Envarsus, switched to Belatacept 6/23 with good graft function  - 6/25: Tx to SICU for refractory seizures, improved with IV antiepileptic regimen  - 7/10: Downgraded from SICU to floor.   - 7/11: OR-->URETERAL STENT REMOVED  - 7/15: ESBL Kleb UTI (50-90K), completed Ertapenem x 3 Days  - 7/25: Tx to SICU for Sepsis/ elevated LFTS  - 7/27: Shiley placed for HD  - 7/28: ongoing fevers -> started Ertapenem/Fluc  - 7/29: HD restarted + 1U PRBC.  IR bx with 2A rejection, started pulse steroids. LFTs have improved  - 8/4: refractory seizures    Interval events:   - No further seizures noted  - Started on Briviact in addition to Fosphenytoin  - Awake and alert  Ox3  - Was restless all night, minimal sleep as per RNs  - R sided erythema around wound w/ scrotal and penile edema relatively stable. Reports minimal discomfort  - WBC downtrending   - Minimal urine output, cloudy appearance  - HD -2.5L    Immunosuppression  Induction:  Thymoglobulin                                        Maintenance: Sirolimus as of 8/2,  Cellcept to 250 BID, Pred 10mg po qd   Ongoing monitoring for signs of rejection.    Potential Discharge date: pending clinical improvement.     Education:  Medications    Plan of care:  See Below      MEDICATIONS  (STANDING):  amLODIPine   Tablet 10 milliGRAM(s) Oral daily  brivaracetam 50 milliGRAM(s) Oral two times a day  brivaracetam  IVPB 50 milliGRAM(s) IV Intermittent once  chlorhexidine 2% Cloths 1 Application(s) Topical <User Schedule>  doxazosin 4 milliGRAM(s) Oral <User Schedule>  epoetin cortney-epbx (RETACRIT) Injectable 16437 Unit(s) IV Push every 7 days  finasteride 5 milliGRAM(s) Oral daily  heparin   Injectable 5000 Unit(s) SubCutaneous every 12 hours  hydrALAZINE 25 milliGRAM(s) Oral every 8 hours  insulin glargine Injectable (LANTUS) 8 Unit(s) SubCutaneous at bedtime  insulin lispro (ADMELOG) corrective regimen sliding scale   SubCutaneous three times a day before meals  insulin lispro (ADMELOG) corrective regimen sliding scale   SubCutaneous at bedtime  insulin lispro Injectable (ADMELOG) 8 Unit(s) SubCutaneous three times a day before meals  levothyroxine 50 MICROGram(s) Oral daily  melatonin 6 milliGRAM(s) Oral at bedtime  mycophenolate mofetil 250 milliGRAM(s) Oral <User Schedule>  nystatin    Suspension 395755 Unit(s) Oral four times a day  pantoprazole    Tablet 40 milliGRAM(s) Oral <User Schedule>  petrolatum white Ointment 1 Application(s) Topical two times a day  phenytoin   Capsule 200 milliGRAM(s) Oral two times a day  polyethylene glycol 3350 17 Gram(s) Oral daily  predniSONE   Tablet 10 milliGRAM(s) Oral daily  senna 2 Tablet(s) Oral at bedtime  sirolimus 5 milliGRAM(s) Oral <User Schedule>  trimethoprim   80 mG/sulfamethoxazole 400 mG 1 Tablet(s) Oral daily  valGANciclovir 450 milliGRAM(s) Oral <User Schedule>    MEDICATIONS  (PRN):  acetaminophen     Tablet .. 650 milliGRAM(s) Oral every 6 hours PRN Mild Pain (1 - 3)  diphenhydrAMINE 25 milliGRAM(s) Oral at bedtime PRN Insomnia      PAST MEDICAL & SURGICAL HISTORY:  Hypertension      Diabetes      Dyslipidemia      CAD (Coronary Artery Disease)  with Stents in 06/2009 and 6/2019, s/p off-pump C3L on 8/13/20      Hypothyroidism      CVA (cerebral vascular accident)  12/13/19 with residual bilateral weakness      Anemia      PEG (percutaneous endoscopic gastrostomy) status  removed July 2020      Intubation of airway performed without difficulty  Dec 2019  CVA c/b status epilepticus requiring intubation and PEG in 12/2019-      ESRD on dialysis  M/W/F      Seizures  after CVA, last seizure was last week 4/07/22      History of insertion of stent into coronary artery bypass graft  2009 and 2019      S/P CABG x 3  off pump C3L on 8/13/20          Vital Signs Last 24 Hrs  T(C): 37.5 (18 Aug 2022 13:10), Max: 37.5 (18 Aug 2022 13:10)  T(F): 99.5 (18 Aug 2022 13:10), Max: 99.5 (18 Aug 2022 13:10)  HR: 71 (18 Aug 2022 13:10) (71 - 81)  BP: 160/71 (18 Aug 2022 13:10) (151/59 - 191/71)  BP(mean): 96 (18 Aug 2022 01:20) (96 - 99)  RR: 18 (18 Aug 2022 13:10) (18 - 20)  SpO2: 99% (18 Aug 2022 13:10) (95% - 99%)    Parameters below as of 18 Aug 2022 13:10  Patient On (Oxygen Delivery Method): room air        I&O's Summary    17 Aug 2022 07:01  -  18 Aug 2022 07:00  --------------------------------------------------------  IN: 1100 mL / OUT: 2550 mL / NET: -1450 mL    18 Aug 2022 07:01  -  18 Aug 2022 14:36  --------------------------------------------------------  IN: 300 mL / OUT: 10 mL / NET: 290 mL                              7.0    10.44 )-----------( 296      ( 18 Aug 2022 06:17 )             22.3     08-18    134<L>  |  94<L>  |  55<H>  ----------------------------<  95  3.8   |  26  |  3.50<H>    Ca    8.6      18 Aug 2022 06:20  Phos  3.3     08-18  Mg     2.0     08-18    TPro  6.3  /  Alb  2.5<L>  /  TBili  0.3  /  DBili  0.2  /  AST  20  /  ALT  18  /  AlkPhos  271<H>  08-18          Culture - Blood (collected 08-14-22 @ 15:49)  Source: .Blood Blood-Peripheral  Preliminary Report (08-15-22 @ 21:02):    No growth to date.        REVIEW OF SYSTEMS  --------------------------------------------------------------------------------  unable to obtain full accurate ROS, but denies CP, SOB.    PHYSICAL EXAM:  Constitutional: awake, weak  Eyes: Anicteric  ENMT: nc/at  Respiratory: not tachypneic, non-labored on RA    Cardiovascular: normotensive, regular rate on monitor   Gastrointestinal: Soft, non distended, nontender, RLQ incisional/groin ecchymotic.   Genitourinary: anuric on HD   Extremities: SCD's in place and working bilaterally, overall with improving edema  Vascular: Palpable dp pulses bilaterally  Neurological: AAOx2  Skin:  As noted above. No ulcerations   Musculoskeletal: Moving all extremities  Psychiatric: overall calm, following directions

## 2022-08-18 NOTE — PROVIDER CONTACT NOTE (MEDICATION) - RECOMMENDATIONS
Insulin gtt? Check sugars more frequently?
hold medications, administer finestaride post dialysis reassess fingerstick at dinner time

## 2022-08-18 NOTE — PROVIDER CONTACT NOTE (MEDICATION) - SITUATION
PO hydralazine held for dialysis, finestaride pushed back, and pre-meal insulin held as patient does not want to eat

## 2022-08-18 NOTE — EEG REPORT - PATIENT STATUS
Awake/Drowsy/Asleep

## 2022-08-18 NOTE — EEG REPORT - THIS IS A
Continuous Video EEG

## 2022-08-18 NOTE — EEG REPORT - NS EEG TEXT BOX
MANJINDERCHAD [id number]     Study Date: 08-18-22 0800 to -1630 8/18/22  Study hours: 8H 30min  --------------------------------------------------------------------------------------------------  History:  CC/ HPI Patient is a 66y old  Male who presents with a chief complaint of Status Epilepticus (04 Aug 2022 12:41)    MEDICATIONS  (STANDING):  chlorhexidine 2% Cloths 1 Application(s) Topical <User Schedule>  doxazosin 4 milliGRAM(s) Oral <User Schedule>  epoetin cortney-epbx (RETACRIT) Injectable 62691 Unit(s) SubCutaneous every 7 days  fosphenytoin IVPB 200 milliGRAM(s) PE IV Intermittent every 12 hours  heparin   Injectable 5000 Unit(s) SubCutaneous every 8 hours  hydrALAZINE 25 milliGRAM(s) Oral every 8 hours  insulin lispro (ADMELOG) corrective regimen sliding scale   SubCutaneous every 6 hours  melatonin 6 milliGRAM(s) Oral at bedtime  mycophenolate mofetil Suspension 1000 milliGRAM(s) Enteral Tube <User Schedule>  pantoprazole  Injectable 40 milliGRAM(s) IV Push daily  senna Syrup 10 milliLiter(s) Oral at bedtime  sodium bicarbonate 1300 milliGRAM(s) Oral <User Schedule>    --------------------------------------------------------------------------------------------------  Study Interpretation:    [Abbreviation Key:  PDR=alpha rhythm/posterior dominant rhythm. A-P=anterior posterior.  Amplitude: ‘very low’:<20; ‘low’:20-49; ‘medium’:; ‘high’:>150uV.  Persistence for periodic/rhythmic patterns (% of epoch) ‘rare’:<1%; ‘occasional’:1-10%; ‘frequent’:10-50%; ‘abundant’:50-90%; ‘continuous’:>90%.  Persistence for sporadic discharges: ‘rare’:<1/hr; ‘occasional’:1/min-1/hr; ‘frequent’:>1/min; ‘abundant’:>1/10 sec.  RPP=rhythmic and periodic patterns; GRDA=generalized rhythmic delta activity; FIRDA=frontal intermittent GRDA; LRDA=lateralized rhythmic delta activity; TIRDA=temporal intermittent rhythmic delta activity;  LPD=PLED=lateralized periodic discharges; GPD=generalized periodic discharges; BIPDs =bilateral independent periodic discharges; Mf=multifocal; SIRPDs=stimulus induced rhythmic, periodic, or ictal appearing discharges; BIRDs=brief potentially ictal rhythmic discharges >4 Hz, lasting .5-10s; PFA (paroxysmal bursts >13 Hz or =8 Hz <10s).  Modifiers: +F=with fast component; +S=with spike component; +R=with rhythmic component.  S-B=burst suppression pattern.  Max=maximal. N1-drowsy; N2-stage II sleep; N3-slow wave sleep. SSS/BETS=small sharp spikes/benign epileptiform transients of sleep. HV=hyperventilation; PS=photic stimulation]    Daily EEG Visual Analysis  FINDINGS:      Background:  Continuous: continuous  Symmetry: asymmetric  PDR: 7.5 Hz during maximal wakefulness, that attenuates to eye opening      Reactivity: present  Voltage: normal (between 20-150uV)  Anterior Posterior Gradient: present  Other background findings: none  Breach: absent    Background Slowing:  Generalized slowing: polymorphic theta and delta slowing   Focal slowing: continuous left hemispheric theta and delta slowing    State Changes:   -Drowsiness noted with increased slowing, attenuation of fast activity, vertex transients.  -Present N2 sleep transients    Sporadic Epileptiform Discharges:    Frequent sharp waves over the left posterior quadrant      Electrographic and Electroclinical seizures:  None     Other Clinical Events:  None    Activation Procedures:   -Hyperventilation was not performed.    -Photic stimulation was not performed.     Artifacts:  Intermittent myogenic and movement artifacts were noted.  Significant movement artifact in the initial portions of the recording [id number], and subsequently 00:49-01:40, 02:05-03:19    ECG:  The heart rate on single channel ECG was predominantly 70 BPM.    EEG Classification / Summary:  Abnormal EEG study  Frequent sharp waves over the left posterior quadrant    Mild to moderate focal left centroparietal slowing   Mild to moderate generalized background slowing    -----------------------------------------------------------------------------------------------------  Clinical Impression:  Abnormal EEG study   Left hemispheric dysfunction with increased risk for seizures from the left posterior quadrant  Mild to moderate diffuse cerebral dysfunction, not specific as to etiology.  No seizures since 8/16/22 at 20:22PM    Gurwinder Carpenter MD  EEG/Epilepsy Attending     [facility] EEG Reading Room Ph#: [phone]  Epilepsy Answering Service after 5PM and before 8:30AM: Ph#: [phone]

## 2022-08-18 NOTE — PROGRESS NOTE ADULT - ASSESSMENT
67yo man with PMH of CVA (2019) with subsequent seizure disorder, ESRD s/p renal transplant (04/2022), afib (on apixaban), CAD (s/p stents), CABG (2020), HTN, HLD, DM presents to the ED with status epilepticus from [facility]. Semiology includes eyes deviated to the R, rhythmic R facial twitching as well as R shoulder clonic movements lasting approximately 30 seconds each. EEG from 04/2022 showed L frontocentral focal onset seizures. S/p intubation on 6/11. EEG negative for seizure but showed structural or functional abnormality in the left fronto-temporal region and moderate to severe nonspecific diffuse or multifocal cerebral dysfunction. RRT called 6/21 for decreased movement of RUE but with increased tone and tremor like activity/ clonus and diffuse increased tone and increased encephalopathy concerning for seizure. Course then complicated by EEG 6/24 showing L frontotemporal/ frontal seizures. Was on keppra, valproic acid, vimpat but had seizures through the prior two medications. Stopped vimpat, valproic acid, standing ativan, and fycompa. Stopped fycompa as patient became agitated, aggressive. Was only on phenytoin until recalled on evening of 8/3 into AM of 8/4 for concern of seizure. Now improved. Currently on fosphenytoin.    EEG 8/4:   2 Focal motor seizures originating from the left posterior temporal region with frontal evolution  Mild focal left centroparietal slowing   Mild generalized background slowing    EEG 8/5:   Clinical Impression:  Left hemispheric dysfunction  Mild diffuse cerebral dysfunction, not specific as to etiology.  No seizures since 8/4/22 5AM     EEG 8/6: Abnormal EEG study   One electroclinical seizure originating from the left centroparietal region of 1 minute of duration on 8/16/22 at 20:22PM. Clinically patient presented clonic movements of arms and legs.   Left hemispheric dysfunction with increased risk for seizures from the left posterior quadrant  Mild to moderate diffuse cerebral dysfunction, not specific as to etiology.    EEG 8/18  Clinical Impression:  Abnormal EEG study   Left hemispheric dysfunction with increased risk for seizures from the left posterior quadrant  Mild to moderate diffuse cerebral dysfunction, not specific as to etiology.  No seizures since 8/16/22 at 20:22PM    Impression: History of recent status epilepticus that was refractory, was stable on phenytoin. Post HD and hypertensive with concerns of systemic inflammatory process (elevated leukocytosis patient found with recurrent seizures. Mental status improved possibly from hospital-induced delirium with toxic-metabolic encephalopathy and infectious encephalopathy.     Recommendations:  [ ] Continue PO phenytoin 200mg BID.   [ ] Continue briviact 50mg BID with additional 50mg after dialysis (please ensure this is added on days of dialysis)   [ ] Can stop EEG. Will consider obtaining EEG next week and if no events, will consider weaning phenytoin.  [x] CSF without evidence of infection, inflammation  [x] Vitamin B12 1157, folate >20, homocysteine 13.4, Vitamin B1 160  [x] TSH 4.6, T3 3.9/T4 43 - correction of hypothyroidism per primary team and endocrinology  [x] BP goals of normotension   [x] MR brain without clear evidence of infection, inflammation, or acute CNS event (possibly minimal enhancement). Although prior study read as possible PRES, most recent MRI seems more consistent with significant chronic ischemic microvascular disease given no associated DWI changes or obvious enhancement  [x] Telemetry monitoring  [x] Neuro checks and vital signs per unit protocol  [x] Seizure/fall/aspiration precautions  [ ] please have adequate sleeping environment for the patient, light on, curtains open, TV on during the day with regular stimulation and out of bed to chair if possible. During the night, close curtains, TV off, lights off, and minimize interruptions (limit blood draws and vital sign checks if possible). Regular interaction with patient with staff (introducing selves), frequent reorientation, and encouragement of visitors as allowed by hospital and unit policy. Regular toileting.  [/] Advise against driving, operating heavy machinery, water sports/bathing, activity in elevation without appropriate safety precautions  [ ] outpatient EMG/ NCS    Patient seen and discussed with neurology attending, Dr. Maria.

## 2022-08-19 NOTE — PROGRESS NOTE ADULT - ASSESSMENT
66M with ESRD s/p PermCath removal 5/10/22 s/p Rt DDRT 4/21/22,  CVA (2019), Afib on apixaban, seizure activity, CAD s/p stents, CABG (2020), HTN, HLD, DM on insulin, gastric/duodenal ulcer, recent hospitalization 4/28/22 -5/10/22 with weakness/anemia found to have perinephric hematoma requiring evacuation and repair of bleeding arterial anastomosis admitted 6/10 for status epilepticus requiring intubation for hypoxic respiratory failure.     s/p LP which was not suggestive of infectious process  U/A (7/12) 161 WBC  UCx (7/13) 50-99K ESBL Klebsiella   s/p 3 days of Ertapenem  UA (7/25) with 40 WBC  UCx (7/25) 50-99k ESBL Klebsiella     RUQ (7/25) with hepatomegaly   CT c/a/p (7/25) with only small perinephric fluid collection, small volume ascites.  Doppler US R Kidney (7/27) with mild thickening of collecting system and ureter and small   Doppler US R Kidney (7/29) with fullness of collecting system and continued urothelial thickening.     RVP (7/25) Negative  CMV PCR (7/22) Negative  EBV PCR (7/25) Negative   HBV PCR (7/25) Negative   HHV-6 PCR (7/25) POSITIVE   Parvovirus IgM and PCR (7/26) Negative   HSV 1/2 PCR (7/26) Negative   Adenovirus PCR (7/26) Negative    CT A/P (8/5) with No drainable fluid collections and Right psoas retroperitoneal hematoma.    Antibiotic Course:  Vancomycin: 6/11, 6/26 --> 6/29, 7/25, 8/4 -->   Ertapenem: 6/14 --> 6/18, 6/22 --> 6/23, 7/15 --> 7/17, 7/28 --> 8/3  Meropenem: 6/26 --> 6/29, 7/25 --> 7/28  Fluconazole: 6/11 --> 6/21, 6/26 --> 7/6, 7/26 --> 8/10    #Rash, Erythema overlying Renal Transplant, Leukocytosis, Fever  Developed after 10 days of Carbapenem Therapy  No significant improvement after Vancomycin initiation  Concern for atypical infectious etiology (or noninfectious process)  Dermatology Surgical Path (8/8) nonspecific findings including edema, telangiectasia and sparse mixed infiltrate of lymphocytes and neutrophils, culture prelim negative, AFB negative.   Findings are nonspecific ?Cellulitis  --Favor monitoring off of further Vancomycin; now s/p >14 days without significant improvement in erythema  --If continued erythema/pain at abdominal wall would consider biopsy of abdominal wall muscle for additional culture yield  --Follow up on Dermatology Fungal and AFB Cultures    #Positive Blood Culture  BCx (8/8) with Coagulase negative staph 1 out of 4   BCx (8/9) NGTD  Suspect Coagulase negative staph is a contaminant  --Follow up on prelim cultures    #ESRD s/p DDRT (4/21/2022) (CMV +/+, EBV +/+)  --Continue Bactrim SS PO Q24H for PCP PPx  --Continue Valcyte 450 mg PO Mon/Wed/Fri for CMV PPx    I will be away over this upcoming weekend. Please contact the Infectious Diseases Office with any further questions or concerns.     Carlton Hoover M.D.  [facility] Division of Infectious Disease  8AM-5PM Monday - Friday: Available on [employer] Teams  After Hours and Holidays (or if no response on Microsoft Teams): Please contact the Infectious Diseases Office at [phone]     The above assessment and plan were discussed with Kirstie, transplant surgery PA

## 2022-08-19 NOTE — PROGRESS NOTE ADULT - NUTRITIONAL ASSESSMENT
This patient has been assessed with a concern for Malnutrition and has been determined to have a diagnosis/diagnoses of Severe protein-calorie malnutrition.    This patient is being managed with:   Diet Soft and Bite Sized-  Consistent Carbohydrate {Evening Snack} (CSTCHOSN)  1000mL Fluid Restriction (COORXL2320)  For patients receiving Renal Replacement - No Protein Restr No Conc K No Conc Phos Low Sodium (RENAL)  Supplement Feeding Modality:  Oral  Nepro Cans or Servings Per Day:  3       Frequency:  Three Times a day  Entered: Aug 10 2022 11:07AM

## 2022-08-19 NOTE — PROGRESS NOTE ADULT - SUBJECTIVE AND OBJECTIVE BOX
Transplant Surgery - Multidisciplinary Progress Note  --------------------------------------------------------------  DDRT     4/21/2022            Present:  Patient seen with multidisciplinary team including Transplant Surgeon: Dr. Mann, Dr. Barber, Nephrologist: Dr. JONO Lomeli, WALDO Esparza, Pharmacist: Michelle, and unit RN during am rounds. Disciplines not in attendance will be notified of the plan.      HPI: 66M with PMH: DM type II, HTN, CAD s/p CABG in 2020, AFib on Eliquis, CVA in 2019 due to Afib, Seizure d/o following CVA, last episode was on 4/8/22, h/o GIB in 2/2022 EGD with duodenal ulcer.  ESRD due to DM was on HD since 2019.     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  Donor was 58 , KDPI 82%, DCD, single vessels and ureter, HLA mismatch 1, 2, 2. No DSA, cPRA 0%. 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 Re-admitted in May for anemia in the setting of large perinephric hematoma s/p evacuation and repair of arterial anastomosis on 5/2/22. Intra operative biopsy showed no rejection, Creatinine ranging ~2mg/dL.    In Rehab:  He was recently dx with klebsiella UTI with Ertapenem - then switched to Levaquin    He also had parainfluenza pneumonia. Was at rehab progressing well.      Hospital course:  - 6/10: transferred from rehab facility for seizure status epilepticus. Intubated for respiratory protection and transferred to MICU.  EEG showed no seizure activity. Neuro consulted.   - 6/11: Extubated. Found to have R pleural effusion. s/p Thoracentesis 1.3L. Eliquis changed to heparin drip.  Post procedure drop in H&H. 5u PRBC, 2 u FFP.   - 6/11 evening: Transferred to SICU from MICU for further care in setting of hypoxia, significant R pleural effusion, anemia and hemodynamic instability  - 6/11 Intubated at 9pm and sedated on Precedex.  CT placed, 600ml blood drained.  1L total overnight, started pressors  - 6/11 Received Protamine for PTT >100 (was on Heparin drip started for AF), 2 u FFP and 5u PRBC total  - 6/13 s/p VATS 2.2L old blood removed; second chest tube placed  - 6/18-19: chest tubes removed  - 6/21: ?PRES vs. chronic ischemic changes on MRI, off Envarsus, switched to Belatacept 6/23 with good graft function  - 6/25: Tx to SICU for refractory seizures, improved with IV antiepileptic regimen  - 7/10: Downgraded from SICU to floor.   - 7/11: OR-->URETERAL STENT REMOVED  - 7/15: ESBL Kleb UTI (50-90K), completed Ertapenem x 3 Days  - 7/25: Tx to SICU for Sepsis/ elevated LFTS  - 7/27: Shiley placed for HD  - 7/28: ongoing fevers -> started Ertapenem/Fluc  - 7/29: HD restarted + 1U PRBC.  IR bx with 2A rejection, started pulse steroids. LFTs have improved  - 8/4: refractory seizures    Interval events:   - no acute overnight events  - awake, alert, following commands  - Plan for HD today   - AC remains on hold     Immunosuppression  Maintenance: Sirolimus as of 8/2,  Cellcept to 250 BID, Pred 10mg po qd   Ongoing monitoring for signs of rejection.    Potential Discharge date: pending clinical improvement.   Education:  Medications  Plan of care:  See Below    MEDICATIONS  (STANDING):  amLODIPine   Tablet 10 milliGRAM(s) Oral daily  brivaracetam 50 milliGRAM(s) Oral two times a day  chlorhexidine 2% Cloths 1 Application(s) Topical <User Schedule>  doxazosin 4 milliGRAM(s) Oral <User Schedule>  epoetin cortney-epbx (RETACRIT) Injectable 25237 Unit(s) IV Push every 7 days  finasteride 5 milliGRAM(s) Oral daily  heparin   Injectable 5000 Unit(s) SubCutaneous every 12 hours  hydrALAZINE 25 milliGRAM(s) Oral every 8 hours  insulin glargine Injectable (LANTUS) 7 Unit(s) SubCutaneous at bedtime  insulin lispro (ADMELOG) corrective regimen sliding scale   SubCutaneous three times a day before meals  insulin lispro (ADMELOG) corrective regimen sliding scale   SubCutaneous at bedtime  insulin lispro Injectable (ADMELOG) 8 Unit(s) SubCutaneous three times a day before meals  levothyroxine 50 MICROGram(s) Oral daily  melatonin 6 milliGRAM(s) Oral at bedtime  mycophenolate mofetil 250 milliGRAM(s) Oral <User Schedule>  nystatin    Suspension 299635 Unit(s) Oral four times a day  pantoprazole    Tablet 40 milliGRAM(s) Oral <User Schedule>  petrolatum white Ointment 1 Application(s) Topical two times a day  phenytoin   Capsule 200 milliGRAM(s) Oral two times a day  polyethylene glycol 3350 17 Gram(s) Oral daily  predniSONE   Tablet 10 milliGRAM(s) Oral daily  senna 2 Tablet(s) Oral at bedtime  sirolimus 5 milliGRAM(s) Oral <User Schedule>  trimethoprim   80 mG/sulfamethoxazole 400 mG 1 Tablet(s) Oral daily  valGANciclovir 450 milliGRAM(s) Oral <User Schedule>    MEDICATIONS  (PRN):  acetaminophen     Tablet .. 650 milliGRAM(s) Oral every 6 hours PRN Mild Pain (1 - 3)  diphenhydrAMINE 25 milliGRAM(s) Oral at bedtime PRN Insomnia    PAST MEDICAL & SURGICAL HISTORY:  Hypertension  Diabetes  Dyslipidemia  CAD (Coronary Artery Disease)with Stents in 06/2009 and 6/2019, s/p off-pump C3L on 8/13/20  Hypothyroidism  CVA (cerebral vascular accident)12/13/19 with residual bilateral weakness  PEG (percutaneous endoscopic gastrostomy) removed July 2020  Seizures after CVA, last seizure was last week 4/07/22  History of insertion of stent into coronary artery bypass graft 2009 and 2019  S/P CABG x 3 off pump C3L on 8/13/20    Vital Signs Last 24 Hrs  T(C): 37 (19 Aug 2022 08:23), Max: 37.5 (18 Aug 2022 13:10)  T(F): 98.6 (19 Aug 2022 08:23), Max: 99.5 (18 Aug 2022 13:10)  HR: 77 (19 Aug 2022 08:23) (71 - 80)  BP: 168/77 (19 Aug 2022 08:23) (147/65 - 177/76)  BP(mean): 105 (18 Aug 2022 21:10) (105 - 105)  RR: 18 (19 Aug 2022 08:23) (17 - 18)  SpO2: 97% (19 Aug 2022 08:23) (95% - 100%)    I&O's Summary    18 Aug 2022 07:01  -  19 Aug 2022 07:00  --------------------------------------------------------  IN: 1180 mL / OUT: 2010 mL / NET: -830 mL                          7.1    9.75  )-----------( 315      ( 19 Aug 2022 07:27 )             22.3     08-19    135  |  93<L>  |  81<H>  ----------------------------<  194<H>  4.4   |  20<L>  |  4.80<H>    Ca    9.0      19 Aug 2022 07:29  Phos  4.6     08-19  Mg     2.1     08-19    TPro  6.4  /  Alb  2.6<L>  /  TBili  0.3  /  DBili  0.2  /  AST  22  /  ALT  21  /  AlkPhos  291<H>  08-19    Culture - Blood (collected 08-14-22 @ 15:49)  Source: .Blood Blood-Peripheral  Preliminary Report (08-15-22 @ 21:02):    No growth to date.        REVIEW OF SYSTEMS  --------------------------------------------------------------------------------  unable to obtain full accurate ROS, but denies CP, SOB.    PHYSICAL EXAM:  Constitutional: awake, weak  Eyes: Anicteric  ENMT: nc/at  Respiratory: not tachypneic, non-labored on RA    Cardiovascular: normotensive, regular rate on monitor   Gastrointestinal: Soft, non distended, nontender, RLQ incisional/groin ecchymotic.   Genitourinary: anuric on HD   Extremities: SCD's in place and working bilaterally, overall with improving edema  Vascular: Palpable dp pulses bilaterally  Neurological: AAOx2  Skin:  As noted above. No ulcerations   Musculoskeletal: Moving all extremities  Psychiatric: overall calm, following directions

## 2022-08-19 NOTE — PROGRESS NOTE ADULT - ASSESSMENT
Patient is a 66  M with Type 2 DM> unknown control due to skewed A1C 6.6% secondary to chronic anemia and s/p blood tx. On basal/bolus insulin therapy PTA. DM c/b ESRD s/p DDRT on 3/21, CVA, CAD s/p CABG, Afib on apixaban, seizure activity, HTN, HLD, h/o GI bleed in 2/2022 EGD with duodenal ulcer, discharged to [facility] rehab center after prior hospitalization, now re-admitted from [facility] for seizures c/f status epilepticus in setting of UTI. Endocrine consulted for steroid induced hyperglycemia. Prolonged hospital course w/ ICU stay, previously intubated/extubated, previous seizures. S/p ureteral stent removal 7/12/22.       1.  DM  - T2DM   - Most recent Hemoglobin A1C 6.0 unreliable in the setting of anemia  - Current FS ranges from 203-251  - Current diet: CHO  - Please monitor blood glucose values TID AC & QHS while eating regular meals and Q6H while NPO  -  BG Goal 100-180mg/dl   - Recommendations:  - Currently on Prednisone steroid 10 mg daily  - FBG above goal Increase Lantus to 7 units QHS  - Prandial BG above goal Increase Admelog to 9 units sq qhs, hold if NPO or if eating less than 50% of meals  - Continue with low dose correctional scale TID with meals  - Continue with low dose correctional scale QHS    Discharge planning:   - plan to rehab, likely will need basal bolus insulin final doses tbd  Outpatient f/u with Endocrinologist Dr. Lincoln. [address] [facility] [address]. Phone  Needs apt at time of discharge  -Needs optho/renal/cardiac f/u as out pt  -Make sure pt has insulin and DM supplies at time of discharge.    2. HTN  - BP goal 130/80  - Manage per primary team     3. Hypothyroidism   - Continue with LT4 50 mcg daily   - Noted med given with other meds including Protonix which inhibits LT4 absorption.   - Please make sure LT4 is given on an empty stomach at least one hour apart from other meds and food to ensure med absorption. DO NOT GIVE WITH VITAMINS/ANTACIDS  - If unable to ensure proper time administration switching to IV dosing in order to ensure med absorption. Synthroid 37.5mcg IV   Monitor TFTs outpatient. TSH can be skewed when critically ill.    Discussed with patient and primary  team Transplant   Contact via [employer] Teams during business hours  On evenings and weekends, please call [phone] or page endocrine fellow on call.   Email: [email or web address]   Please note that this patient may be followed by different provider tomorrow.    greater than 50% of the encounter was spent counseling and/or coordination of care.  26 minutes spent on total encounter; The necessity of the time spent during the encounter on this date of service was due to development of plan of care/coordination of care/glycemic control through review of labs, blood glucose values and vital signs.

## 2022-08-19 NOTE — PROGRESS NOTE ADULT - SUBJECTIVE AND OBJECTIVE BOX
Follow Up:      Interval History:    REVIEW OF SYSTEMS  [  ] ROS unobtainable because:    [  ] All other systems negative except as noted below    Constitutional:  [ ] fever [ ] chills  [ ] weight loss  [ ] weakness  Skin:  [ ] rash [ ] phlebitis	  Eyes: [ ] icterus [ ] pain  [ ] discharge	  ENMT: [ ] sore throat  [ ] thrush [ ] ulcers [ ] exudates  Respiratory: [ ] dyspnea [ ] hemoptysis [ ] cough [ ] sputum	  Cardiovascular:  [ ] chest pain [ ] palpitations [ ] edema	  Gastrointestinal:  [ ] nausea [ ] vomiting [ ] diarrhea [ ] constipation [ ] pain	  Genitourinary:  [ ] dysuria [ ] frequency [ ] hematuria [ ] discharge [ ] flank pain  [ ] incontinence  Musculoskeletal:  [ ] myalgias [ ] arthralgias [ ] arthritis  [ ] back pain  Neurological:  [ ] headache [ ] seizures  [ ] confusion/altered mental status    Allergies  No Known Allergies        ANTIMICROBIALS:  nystatin    Suspension 635982 four times a day  trimethoprim   80 mG/sulfamethoxazole 400 mG 1 daily  valGANciclovir 450 <User Schedule>      OTHER MEDS:  MEDICATIONS  (STANDING):  acetaminophen     Tablet .. 650 every 6 hours PRN  amLODIPine   Tablet 10 daily  brivaracetam 50 two times a day  diphenhydrAMINE 25 at bedtime PRN  doxazosin 4 <User Schedule>  epoetin cortney-epbx (RETACRIT) Injectable 52774 every 7 days  finasteride 5 daily  heparin   Injectable 5000 every 12 hours  hydrALAZINE 50 three times a day  insulin glargine Injectable (LANTUS) 7 at bedtime  insulin lispro (ADMELOG) corrective regimen sliding scale  three times a day before meals  insulin lispro (ADMELOG) corrective regimen sliding scale  at bedtime  insulin lispro Injectable (ADMELOG) 9 three times a day before meals  levothyroxine 50 daily  melatonin 6 at bedtime  mycophenolate mofetil 250 <User Schedule>  pantoprazole    Tablet 40 <User Schedule>  phenytoin   Capsule 200 two times a day  polyethylene glycol 3350 17 daily  predniSONE   Tablet 10 daily  senna 2 at bedtime  sirolimus 5 <User Schedule>      Vital Signs Last 24 Hrs  T(C): 36.9 (19 Aug 2022 18:00), Max: 37 (19 Aug 2022 05:00)  T(F): 98.5 (19 Aug 2022 18:00), Max: 98.6 (19 Aug 2022 05:00)  HR: 76 (19 Aug 2022 18:00) (64 - 80)  BP: 133/58 (19 Aug 2022 18:00) (133/58 - 173/62)  BP(mean): 84 (19 Aug 2022 18:00) (84 - 105)  RR: 18 (19 Aug 2022 18:00) (18 - 18)  SpO2: 95% (19 Aug 2022 18:00) (95% - 100%)    Parameters below as of 19 Aug 2022 18:00  Patient On (Oxygen Delivery Method): room air        PHYSICAL EXAMINATION:  General: Alert and Awake, NAD  HEENT: PERRL, EOMI  Neck: Supple  Cardiac: RRR, No M/R/G  Resp: CTAB, No Wh/Rh/Ra  Abdomen: NBS, NT/ND, No HSM, No rigidity or guarding  MSK: No LE edema. No Calf tenderness  : No tucker  Skin: No rashes or lesions. Skin is warm and dry to the touch.   Neuro: Alert and Awake. CN 2-12 Grossly intact. Moves all four extremities spontaneously.  Psych: Calm, Pleasant, Cooperative                          7.1    9.75  )-----------( 315      ( 19 Aug 2022 07:27 )             22.3       08-19    135  |  93<L>  |  81<H>  ----------------------------<  194<H>  4.4   |  20<L>  |  4.80<H>    Ca    9.0      19 Aug 2022 07:29  Phos  4.6     08-19  Mg     2.1     08-19    TPro  6.4  /  Alb  2.6<L>  /  TBili  0.3  /  DBili  0.2  /  AST  22  /  ALT  21  /  AlkPhos  291<H>  08-19          MICROBIOLOGY:  Vancomycin Level, Random: 9.9 ug/mL (08-19-22 @ 07:28)  v  .Blood Blood-Peripheral  08-14-22   No growth to date.  --  --      .Blood Blood-Peripheral  08-09-22   No Growth Final  --  --      .Tissue Other  08-08-22   No growth  --    No polymorphonuclear cells seen seen per low power field  No organisms seen per oil power field      .Blood Blood-Peripheral  08-08-22   Growth in aerobic bottle: Staphylococcus hominis  Growth in aerobic bottle: Staphylococcus epidermidis  Coag Negative Staphylococcus  Single set isolate, possible contaminant. Contact  Microbiology if susceptibility testing clinically  indicated.  ***Blood Panel PCR results on this specimen are available  approximately 3 hours after the Gram stain result.***  Gram stain, PCR, and/or culture results may not always  correspond due to difference in methodologies.  ************************************************************  This PCR assay was performed by multiplex PCR. This  Assay tests for 66 bacterial and resistance gene targets.  Please refer to the [facility] test directory  at [email or web address] for details.  --  Blood Culture PCR      .Blood Blood  08-03-22   No Growth Final  --  --      .Blood Blood-Peripheral  07-30-22   No Growth Final  --  --      .Blood Blood-Peripheral  07-30-22   No Growth Final  --  --      .Blood Blood  07-27-22   No growth  --  --      Catheterized Catheterized  07-25-22   50,000 - 99,000 CFU/mL Klebsiella pneumoniae ESBL  --  Klebsiella pneumoniae ESBL      .Blood Blood  07-25-22   No Growth Final  --  --      .Blood Blood  07-25-22   No Growth Final  --  --                RADIOLOGY:    <The imaging below has been reviewed and visualized by me independently. Findings as detailed in report below> Follow Up:  abdominal wall rash    Interval History: afebrile. pain around abdominal wall less pronounced.     REVIEW OF SYSTEMS  [  ] ROS unobtainable because:    [ x ] All other systems negative except as noted below    Constitutional:  [ ] fever [ ] chills  [ ] weight loss  [ ] weakness  Skin:  [ x] rash [ ] phlebitis	  Eyes: [ ] icterus [ ] pain  [ ] discharge	  ENMT: [ ] sore throat  [ ] thrush [ ] ulcers [ ] exudates  Respiratory: [ ] dyspnea [ ] hemoptysis [ ] cough [ ] sputum	  Cardiovascular:  [ ] chest pain [ ] palpitations [ ] edema	  Gastrointestinal:  [ ] nausea [ ] vomiting [ ] diarrhea [ ] constipation [ ] pain	  Genitourinary:  [ ] dysuria [ ] frequency [ ] hematuria [ ] discharge [ ] flank pain  [ ] incontinence  Musculoskeletal:  [ ] myalgias [ ] arthralgias [ ] arthritis  [ ] back pain  Neurological:  [ ] headache [ ] seizures  [ ] confusion/altered mental status      Allergies  No Known Allergies        ANTIMICROBIALS:  nystatin    Suspension 735812 four times a day  trimethoprim   80 mG/sulfamethoxazole 400 mG 1 daily  valGANciclovir 450 <User Schedule>      OTHER MEDS:  MEDICATIONS  (STANDING):  acetaminophen     Tablet .. 650 every 6 hours PRN  amLODIPine   Tablet 10 daily  brivaracetam 50 two times a day  diphenhydrAMINE 25 at bedtime PRN  doxazosin 4 <User Schedule>  epoetin cortney-epbx (RETACRIT) Injectable 57221 every 7 days  finasteride 5 daily  heparin   Injectable 5000 every 12 hours  hydrALAZINE 50 three times a day  insulin glargine Injectable (LANTUS) 7 at bedtime  insulin lispro (ADMELOG) corrective regimen sliding scale  three times a day before meals  insulin lispro (ADMELOG) corrective regimen sliding scale  at bedtime  insulin lispro Injectable (ADMELOG) 9 three times a day before meals  levothyroxine 50 daily  melatonin 6 at bedtime  mycophenolate mofetil 250 <User Schedule>  pantoprazole    Tablet 40 <User Schedule>  phenytoin   Capsule 200 two times a day  polyethylene glycol 3350 17 daily  predniSONE   Tablet 10 daily  senna 2 at bedtime  sirolimus 5 <User Schedule>      Vital Signs Last 24 Hrs  T(C): 36.9 (19 Aug 2022 18:00), Max: 37 (19 Aug 2022 05:00)  T(F): 98.5 (19 Aug 2022 18:00), Max: 98.6 (19 Aug 2022 05:00)  HR: 76 (19 Aug 2022 18:00) (64 - 80)  BP: 133/58 (19 Aug 2022 18:00) (133/58 - 173/62)  BP(mean): 84 (19 Aug 2022 18:00) (84 - 105)  RR: 18 (19 Aug 2022 18:00) (18 - 18)  SpO2: 95% (19 Aug 2022 18:00) (95% - 100%)    Parameters below as of 19 Aug 2022 18:00  Patient On (Oxygen Delivery Method): room air        PHYSICAL EXAMINATION:  General: Alert and Awake, NAD  Cardiac: RRR, No M/R/G  Resp: CTAB, No Wh/Rh/Ra  Abdomen: RLQ with erythema and induration overlying the renal transplant site with extension into the groin.   MSK: No LE edema. No Calf tenderness  Skin: No rashes or lesions. Skin is warm and dry to the touch.   Neuro: Alert and Awake. CN 2-12 Grossly intact. Moves all four extremities spontaneously.  Psych: Calm, Pleasant, Cooperative                          7.1    9.75  )-----------( 315      ( 19 Aug 2022 07:27 )             22.3       08-19    135  |  93<L>  |  81<H>  ----------------------------<  194<H>  4.4   |  20<L>  |  4.80<H>    Ca    9.0      19 Aug 2022 07:29  Phos  4.6     08-19  Mg     2.1     08-19    TPro  6.4  /  Alb  2.6<L>  /  TBili  0.3  /  DBili  0.2  /  AST  22  /  ALT  21  /  AlkPhos  291<H>  08-19          MICROBIOLOGY:  Vancomycin Level, Random: 9.9 ug/mL (08-19-22 @ 07:28)  v  .Blood Blood-Peripheral  08-14-22   No growth to date.  --  --      .Blood Blood-Peripheral  08-09-22   No Growth Final  --  --      .Tissue Other  08-08-22   No growth  --    No polymorphonuclear cells seen seen per low power field  No organisms seen per oil power field      .Blood Blood-Peripheral  08-08-22   Growth in aerobic bottle: Staphylococcus hominis  Growth in aerobic bottle: Staphylococcus epidermidis  Coag Negative Staphylococcus  Single set isolate, possible contaminant. Contact  Microbiology if susceptibility testing clinically  indicated.  ***Blood Panel PCR results on this specimen are available  approximately 3 hours after the Gram stain result.***  Gram stain, PCR, and/or culture results may not always  correspond due to difference in methodologies.  ************************************************************  This PCR assay was performed by multiplex PCR. This  Assay tests for 66 bacterial and resistance gene targets.  Please refer to the [facility] test directory  at [email or web address] for details.  --  Blood Culture PCR      .Blood Blood  08-03-22   No Growth Final  --  --      .Blood Blood-Peripheral  07-30-22   No Growth Final  --  --      .Blood Blood-Peripheral  07-30-22   No Growth Final  --  --      .Blood Blood  07-27-22   No growth  --  --      Catheterized Catheterized  07-25-22   50,000 - 99,000 CFU/mL Klebsiella pneumoniae ESBL  --  Klebsiella pneumoniae ESBL      .Blood Blood  07-25-22   No Growth Final  --  --      .Blood Blood  07-25-22   No Growth Final  --  --      RADIOLOGY:    <The imaging below has been reviewed and visualized by me independently. Findings as detailed in report below>    < from: Xray Chest 1 View- PORTABLE-Routine (Xray Chest 1 View- PORTABLE-Routine .) (08.17.22 @ 22:35) >  IMPRESSION:  Moderate pulmonary vascular congestion.    < end of copied text >

## 2022-08-19 NOTE — PROGRESS NOTE ADULT - ASSESSMENT
66 year old male with PMH of  DM type II, HTN, CAD s/p CABG in 2020, Afib on Eliquis, CVA in 2019 due to Afib, Seizure d/o (last episode was on 4/8/22), h/o GI bleed in 2/2022 EGD with duodenal ulcer and ESRD on HD s/p R DDRT from DCD donor on 4/21/22 complicated by DGF requiring HD until 4/29/22, later had good graft function who was readmitted with status epilepticus in setting of UTI/antibiotics and sub-therapeutic valproic acid level.  Transferred to SICU on 7/25 with signs of sepsis. 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 Received pulse steroids (Solumedrol 500 on 7/30 -> 250 on 7/31). Currently HD dependent.

## 2022-08-19 NOTE — PROGRESS NOTE ADULT - ASSESSMENT
66M with h/o DM type II, HTN, CAD s/p CABG in 2020, Afib on Eliquis, CVA in 2019 due to Afib, Seizure d/o (last episode was on 4/8/22), h/o GI bleed in 2/2022 EGD with duodenal ulcer and ESRD on HD s/p R DDRT from DCD donor on 4/21/22 complicated by DGF requiring HD until 4/29/22 who presented with status epilepticus in setting of UTI/antibiotics and sub-therapeutic valproic acid level. Transferred to SICU on 7/25 with signs of sepsis, now with refractory seizures    Seizure Disorder:  - Neuro/Epilepsy Teams following. Avoid Fycompa 2/2 agitation in past  - 1 min seizure with clonic movements noted on EEG 8/16.   - Continue additional anti-seizure medication Briviact 50mg BID with additional 50mg post each HD session  - No further seizures noted.  EEG 8/16-8/18  - prior MRI head 6/27 with less concerns for PRES, likely ischemic changes  - o/n CTH 8/3 with no acute findings  - replete lytes aggressively      R DCD DDRT 4/21/22 c/b 2A ACR  - 2A ACR: s/p pulse steroids. Continue Prednisone 10mg QD  - Deferring right psoas muscle/fascia biopsy as wound is not worsening  - HD/UF daily as per nephrology for fluid overload/anuria  - Scrotal support  - 8/14 surveillance BCx negative  - graft doppler stable, stable perinephric collection last US 8/5 and CT scan 8/8  - S/P removal of ureteral stent on 7/12  - Continue Doxazosin/Proscar for BPH  - OOB with RN/PT  - Renal Soft diet as tolerated with PO supplements  - 8/8 Punch biopsy wound Culture: ngtd. Negative for PTLD  - DVT ppx with SQH  - fungitel negative  - LE studies WNL  - Benadryl PRN for sleep  - Daily CRP    Immunosuppression:   - Belatacept: 6/23-8/1 discontinued  - on Sirolimus as of  8/2 daily based on level, Cellcept 250 BID, Pred 20mg QD  - PPX:  Nystatin, bactrim, valcyte (MWF)    DM  - controlled  - Fingerstick ac and qhs  - Continue Lantus 7u qpm and premeal Humalog with SSI  - Endo following    AFib/R IJ DVT   - Eliquis with ~40% less efficacy while on fosphenytoin.    - Lovenox held for ANA and transaminitis   - rate controlled  - will discuss A/C plans daily    HTN:  - Amlodipine/Doxazosin/Hydralazline  - off Coreg due to bradycardia

## 2022-08-19 NOTE — PROGRESS NOTE ADULT - ATTENDING COMMENTS
66 year old male with PMH of  DM type II, HTN, CAD s/p CABG in 2020, Afib on Eliquis, CVA in 2019 due to Afib, Seizure d/o (last episode was on 4/8/22), h/o GI bleed in 2/2022 EGD with duodenal ulcer and ESRD on HD s/p R DDRT from DCD donor on 4/21/22 complicated by DGF requiring HD until 4/29/22, later had good graft function who was readmitted with status epilepticus in setting of UTI/antibiotics and sub-therapeutic valproic acid level.  Transferred to SICU on 7/25 with signs of sepsis. Biopsy from 7/29 demonstrating grade 2a rejection and is now dialysis dependent.    1. 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 No  DSA.   s/p pulse dose steroids. Thymo was not given to treat the rejection as he is too frail and at increased risk for infections. Now with failed allograft function, remains anuric and is dialysis dependent.   Patient  underwent IR guided HD catheter exchange on 8/8/22.  Last IHD was on 8/17. s/p IUF yesterday.  Scheduled for IHD today     2. IS meds- on Sirolimus ,  mg po bid and steroid taper.   3. AMS , seizures - On Fosphenytoin. No further episodes of seizures since 8/3. Neurology following.  4. DM -  on Lantus and lispro.   5. Cellulitis over RLQ - On Vancomycin . CT A/P on 8/6 revealed a retroperitoneal hematoma. Txp USG 8/6  - Patent renal artery vasculature. Stable appearance the transplant kidney .Persistent elevated resistive indices. Diffuse abdominal wall edematous changes right lower quadrant without focal collection, cultures from 8/3  negative so far. Skin bx done on 8/8/22 showed no significant findings. ID follow up.  Improving.

## 2022-08-19 NOTE — PROGRESS NOTE ADULT - PROBLEM SELECTOR PLAN 1
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 No  DSA.   s/p pulse dose steroids. Thymo was not given to treat the rejection as he is too frail and at increased risk for infections. Now with failed allograft function, Remains anuric and is dialysis dependent. Pt underwent IR guided HD catheter exchange on 8/8/22. Has been receiving dialysis daily. Last HD done on 8/15/22 and UF on 8/18/22. Plan for HD today. Monitor for labs.

## 2022-08-19 NOTE — PROGRESS NOTE ADULT - SUBJECTIVE AND OBJECTIVE BOX
[facility] DIVISION OF KIDNEY DISEASES AND HYPERTENSION -- FOLLOW UP NOTE  --------------------------------------------------------------------------------  Chief Complaint: 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 was seen and examined today. Overnight events noted.   Pt reports RUQ pain - intermittent. Denies N/V, fever, chills, diarrhea or constipation or dizziness. Last Hd done on 8/17/22. Received UF yesterday (8/18/22).      PAST HISTORY  --------------------------------------------------------------------------------  No significant changes to PMH, PSH, FHx, SHx, unless otherwise noted    ALLERGIES & MEDICATIONS  --------------------------------------------------------------------------------  Allergies    No Known Allergies    Intolerances    Standing Inpatient Medications  amLODIPine   Tablet 10 milliGRAM(s) Oral daily  brivaracetam 50 milliGRAM(s) Oral two times a day  chlorhexidine 2% Cloths 1 Application(s) Topical <User Schedule>  doxazosin 4 milliGRAM(s) Oral <User Schedule>  epoetin cortney-epbx (RETACRIT) Injectable 91011 Unit(s) IV Push every 7 days  finasteride 5 milliGRAM(s) Oral daily  heparin   Injectable 5000 Unit(s) SubCutaneous every 12 hours  hydrALAZINE 25 milliGRAM(s) Oral every 8 hours  insulin glargine Injectable (LANTUS) 7 Unit(s) SubCutaneous at bedtime  insulin lispro (ADMELOG) corrective regimen sliding scale   SubCutaneous three times a day before meals  insulin lispro (ADMELOG) corrective regimen sliding scale   SubCutaneous at bedtime  insulin lispro Injectable (ADMELOG) 8 Unit(s) SubCutaneous three times a day before meals  levothyroxine 50 MICROGram(s) Oral daily  melatonin 6 milliGRAM(s) Oral at bedtime  mycophenolate mofetil 250 milliGRAM(s) Oral <User Schedule>  nystatin    Suspension 356754 Unit(s) Oral four times a day  pantoprazole    Tablet 40 milliGRAM(s) Oral <User Schedule>  petrolatum white Ointment 1 Application(s) Topical two times a day  phenytoin   Capsule 200 milliGRAM(s) Oral two times a day  polyethylene glycol 3350 17 Gram(s) Oral daily  predniSONE   Tablet 10 milliGRAM(s) Oral daily  senna 2 Tablet(s) Oral at bedtime  sirolimus 5 milliGRAM(s) Oral <User Schedule>  trimethoprim   80 mG/sulfamethoxazole 400 mG 1 Tablet(s) Oral daily  valGANciclovir 450 milliGRAM(s) Oral <User Schedule>    PRN Inpatient Medications  acetaminophen     Tablet .. 650 milliGRAM(s) Oral every 6 hours PRN  diphenhydrAMINE 25 milliGRAM(s) Oral at bedtime PRN    REVIEW OF SYSTEMS  --------------------------------------------------------------------------------  Gen: No fevers/chills  Respiratory: No dyspnea, cough,   CV: No chest pain, PND, orthopnea  GI: No abdominal pain, diarrhea, constipation, nausea, vomiting  Transplant: No pain  : No increased frequency, dysuria, hematuria   MSK: No edema  Neuro: No dizziness/lightheadedness    All other systems were reviewed and are negative, except as noted.    VITALS/PHYSICAL EXAM  --------------------------------------------------------------------------------  T(C): 37 (08-19-22 @ 08:23), Max: 37.5 (08-18-22 @ 13:10)  HR: 77 (08-19-22 @ 08:23) (71 - 80)  BP: 168/77 (08-19-22 @ 08:23) (147/65 - 177/76)  RR: 18 (08-19-22 @ 08:23) (17 - 18)  SpO2: 97% (08-19-22 @ 08:23) (95% - 100%)  Wt(kg): --    08-18-22 @ 07:01  -  08-19-22 @ 07:00  --------------------------------------------------------  IN: 1180 mL / OUT: 2010 mL / NET: -830 mL    Physical Exam:  	Gen: NAD, able to speak in full sentences   	HEENT: PERRL, MMM   	Pulm: CTA B/L, no crackles   	CV: RRR, S1S2+  	Abd: +BS, soft              Transplant: No tenderness, swelling  	: No suprapubic tenderness  	MSK: no edema   	Psych: Normal affect and mood  	Skin: Warm              Access: Left IJ tunneled HD catheter +    LABS/STUDIES  --------------------------------------------------------------------------------              7.1    9.75  >-----------<  315      [08-19-22 @ 07:27]              22.3     135  |  93  |  81  ----------------------------<  194      [08-19-22 @ 07:29]  4.4   |  20  |  4.80        Ca     9.0     [08-19-22 @ 07:29]      Mg     2.1     [08-19-22 @ 07:29]      Phos  4.6     [08-19-22 @ 07:29]    TPro  6.4  /  Alb  2.6  /  TBili  0.3  /  DBili  0.2  /  AST  22  /  ALT  21  /  AlkPhos  291  [08-19-22 @ 07:29]    PT/INR: PT 15.5 , INR 1.34       [08-19-22 @ 07:28]  PTT: 32.2       [08-19-22 @ 07:28]    Creatinine Trend:  SCr 4.80 [08-19 @ 07:29]  SCr 3.50 [08-18 @ 06:20]  SCr 4.59 [08-17 @ 08:12]  SCr 3.59 [08-16 @ 07:31]  SCr 4.24 [08-15 @ 05:41]    Sirolimus (Rapamycin) Level, Serum: 2.6 ng/mL (08-18 @ 06:17)  Sirolimus (Rapamycin) Level, Serum: 2.8 ng/mL (08-17 @ 07:59)  Sirolimus (Rapamycin) Level, Serum: 2.0 ng/mL (08-16 @ 07:05)  Sirolimus (Rapamycin) Level, Serum: 2.2 ng/mL (08-15 @ 05:47)    Urinalysis - [08-16-22 @ 18:49]      Color Yellow / Appearance Slightly Turbid / SG 1.018 / pH 8.5      Gluc 200 mg/dL / Ketone Negative  / Bili Negative / Urobili Negative       Blood Large / Protein >600 / Leuk Est Moderate / Nitrite Positive      RBC 13 / WBC 40 / Hyaline 7 / Gran  / Sq Epi  / Non Sq Epi 4 / Bacteria Moderate

## 2022-08-19 NOTE — PROVIDER CONTACT NOTE (OTHER) - ASSESSMENT
does patient require once a shift bladder scan? patient is not having voids s/p dialysis, collected 10cc urine yesterday for shift and bladder scan showed 3cc in bladder

## 2022-08-19 NOTE — PROGRESS NOTE ADULT - ASSESSMENT

## 2022-08-19 NOTE — PROGRESS NOTE ADULT - SUBJECTIVE AND OBJECTIVE BOX
[facility] [place] [facility]   MD NINA MASON MD, PA KRISTINE SOLTANPOUR, DO INJUNG KO, NP    TEL:  OFFICE: [phone]    From 5pm-7am Answering Service [phone]    -- RENAL FOLLOW UP NOTE ---Date of Service 08-19-22 @ 15:02    Patient is a 66y old  Male who presents with a chief complaint of Status Epilepticus (19 Aug 2022 12:52)      Patient seen and examined at bedside. No chest pain/sob    VITALS:  T(F): 98.6 (08-19-22 @ 13:10), Max: 99.4 (08-18-22 @ 16:50)  HR: 76 (08-19-22 @ 13:10)  BP: 166/76 (08-19-22 @ 13:10)  RR: 18 (08-19-22 @ 13:10)  SpO2: 96% (08-19-22 @ 13:10)  Wt(kg): --    08-18 @ 07:01  -  08-19 @ 07:00  --------------------------------------------------------  IN: 1180 mL / OUT: 2010 mL / NET: -830 mL          PHYSICAL EXAM:  Constitutional: NAD  Neck: No JVD  Respiratory: CTAB, no wheezes, rales or rhonchi  Cardiovascular: S1, S2, RRR  Gastrointestinal: BS+, soft, NT/ND  Extremities: No peripheral edema    Hospital Medications:   MEDICATIONS  (STANDING):  amLODIPine   Tablet 10 milliGRAM(s) Oral daily  brivaracetam 50 milliGRAM(s) Oral two times a day  chlorhexidine 2% Cloths 1 Application(s) Topical <User Schedule>  doxazosin 4 milliGRAM(s) Oral <User Schedule>  epoetin cortney-epbx (RETACRIT) Injectable 17389 Unit(s) IV Push every 7 days  finasteride 5 milliGRAM(s) Oral daily  heparin   Injectable 5000 Unit(s) SubCutaneous every 12 hours  hydrALAZINE 25 milliGRAM(s) Oral every 8 hours  insulin glargine Injectable (LANTUS) 7 Unit(s) SubCutaneous at bedtime  insulin lispro (ADMELOG) corrective regimen sliding scale   SubCutaneous three times a day before meals  insulin lispro (ADMELOG) corrective regimen sliding scale   SubCutaneous at bedtime  insulin lispro Injectable (ADMELOG) 9 Unit(s) SubCutaneous three times a day before meals  levothyroxine 50 MICROGram(s) Oral daily  melatonin 6 milliGRAM(s) Oral at bedtime  mycophenolate mofetil 250 milliGRAM(s) Oral <User Schedule>  nystatin    Suspension 127719 Unit(s) Oral four times a day  pantoprazole    Tablet 40 milliGRAM(s) Oral <User Schedule>  petrolatum white Ointment 1 Application(s) Topical two times a day  phenytoin   Capsule 200 milliGRAM(s) Oral two times a day  polyethylene glycol 3350 17 Gram(s) Oral daily  predniSONE   Tablet 10 milliGRAM(s) Oral daily  senna 2 Tablet(s) Oral at bedtime  sirolimus 5 milliGRAM(s) Oral <User Schedule>  trimethoprim   80 mG/sulfamethoxazole 400 mG 1 Tablet(s) Oral daily  valGANciclovir 450 milliGRAM(s) Oral <User Schedule>      LABS:  08-19    135  |  93<L>  |  81<H>  ----------------------------<  194<H>  4.4   |  20<L>  |  4.80<H>    Ca    9.0      19 Aug 2022 07:29  Phos  4.6     08-19  Mg     2.1     08-19    TPro  6.4  /  Alb  2.6<L>  /  TBili  0.3  /  DBili  0.2  /  AST  22  /  ALT  21  /  AlkPhos  291<H>  08-19    Creatinine Trend: 4.80 <--, 3.50 <--, 4.59 <--, 3.59 <--, 4.24 <--, 3.17 <--, 3.72 <--, 3.54 <--, 3.14 <--    Albumin, Serum: 2.6 g/dL (08-19 @ 07:29)  Phosphorus Level, Serum: 4.6 mg/dL (08-19 @ 07:29)                              7.1    9.75  )-----------( 315      ( 19 Aug 2022 07:27 )             22.3     Urine Studies:  Urinalysis - [08-16-22 @ 18:49]      Color Yellow / Appearance Slightly Turbid / SG 1.018 / pH 8.5      Gluc 200 mg/dL / Ketone Negative  / Bili Negative / Urobili Negative       Blood Large / Protein >600 / Leuk Est Moderate / Nitrite Positive      RBC 13 / WBC 40 / Hyaline 7 / Gran  / Sq Epi  / Non Sq Epi 4 / Bacteria Moderate      Iron 17, TIBC 171, %sat 10      [05-02-22 @ 13:03]  Ferritin 6336      [07-27-22 @ 13:00]  PTH -- (Ca 9.2)      [02-05-22 @ 10:13]   294  HbA1c 6.0      [02-21-20 @ 08:53]  TSH 4.69      [07-06-22 @ 18:36]  Lipid: chol --, , HDL --, LDL --      [07-27-22 @ 13:00]    HBsAg Nonreact      [07-25-22 @ 11:51]  HCV 0.18, Nonreact      [07-25-22 @ 11:51]      RADIOLOGY & ADDITIONAL STUDIES:

## 2022-08-19 NOTE — PROGRESS NOTE ADULT - SUBJECTIVE AND OBJECTIVE BOX
seen earlier today     Chief Complaint: Type 2 Diabetes Mellitus     INTERVAL HX: pt sleeping at this time as he was just walking w/ staff , per d/w RN pt tolerating PO better today, requires assistance from staff. BG above goal past 24 hours       Review of Systems:  pt solmnolent     Allergies    No Known Allergies    Intolerances      MEDICATIONS  (STANDING):  amLODIPine   Tablet 10 milliGRAM(s) Oral daily  brivaracetam 50 milliGRAM(s) Oral two times a day  chlorhexidine 2% Cloths 1 Application(s) Topical <User Schedule>  doxazosin 4 milliGRAM(s) Oral <User Schedule>  epoetin cortney-epbx (RETACRIT) Injectable 38587 Unit(s) IV Push every 7 days  finasteride 5 milliGRAM(s) Oral daily  heparin   Injectable 5000 Unit(s) SubCutaneous every 12 hours  hydrALAZINE 25 milliGRAM(s) Oral every 8 hours  insulin glargine Injectable (LANTUS) 7 Unit(s) SubCutaneous at bedtime  insulin lispro (ADMELOG) corrective regimen sliding scale   SubCutaneous three times a day before meals  insulin lispro (ADMELOG) corrective regimen sliding scale   SubCutaneous at bedtime  insulin lispro Injectable (ADMELOG) 9 Unit(s) SubCutaneous three times a day before meals  levothyroxine 50 MICROGram(s) Oral daily  melatonin 6 milliGRAM(s) Oral at bedtime  mycophenolate mofetil 250 milliGRAM(s) Oral <User Schedule>  nystatin    Suspension 793358 Unit(s) Oral four times a day  pantoprazole    Tablet 40 milliGRAM(s) Oral <User Schedule>  petrolatum white Ointment 1 Application(s) Topical two times a day  phenytoin   Capsule 200 milliGRAM(s) Oral two times a day  polyethylene glycol 3350 17 Gram(s) Oral daily  predniSONE   Tablet 10 milliGRAM(s) Oral daily  senna 2 Tablet(s) Oral at bedtime  sirolimus 5 milliGRAM(s) Oral <User Schedule>  trimethoprim   80 mG/sulfamethoxazole 400 mG 1 Tablet(s) Oral daily  valGANciclovir 450 milliGRAM(s) Oral <User Schedule>        finasteride   5 milliGRAM(s) Oral (08-19-22 @ 11:23)   5 milliGRAM(s) Oral (08-18-22 @ 16:52)    insulin glargine Injectable (LANTUS)   6 Unit(s) SubCutaneous (08-18-22 @ 22:02)    insulin lispro (ADMELOG) corrective regimen sliding scale   2 Unit(s) SubCutaneous (08-19-22 @ 09:13)   2 Unit(s) SubCutaneous (08-18-22 @ 17:40)   2 Unit(s) SubCutaneous (08-18-22 @ 13:24)    insulin lispro (ADMELOG) corrective regimen sliding scale   1 Unit(s) SubCutaneous (08-18-22 @ 22:00)    insulin lispro Injectable (ADMELOG)   8 Unit(s) SubCutaneous (08-19-22 @ 09:13)   8 Unit(s) SubCutaneous (08-18-22 @ 17:39)    levothyroxine   50 MICROGram(s) Oral (08-19-22 @ 05:32)    predniSONE   Tablet   10 milliGRAM(s) Oral (08-19-22 @ 05:32)        PHYSICAL EXAM:  VITALS: T(C): 37 (08-19-22 @ 08:23)  T(F): 98.6 (08-19-22 @ 08:23), Max: 99.5 (08-18-22 @ 13:10)  HR: 77 (08-19-22 @ 08:23) (71 - 80)  BP: 168/77 (08-19-22 @ 08:23) (147/65 - 177/76)  RR:  (17 - 18)  SpO2:  (95% - 100%)  Wt(kg): --  GENERAL: male laying in bed in NAD  Respiratory: Respirations unlabored   Extremities: Warm  NEURO: solmnolent       LABS:    POCT Blood Glucose.: 204 mg/dL (08-19-22 @ 12:18)  POCT Blood Glucose.: 203 mg/dL (08-19-22 @ 08:21)  POCT Blood Glucose.: 251 mg/dL (08-18-22 @ 21:08)  POCT Blood Glucose.: 220 mg/dL (08-18-22 @ 17:23)  POCT Blood Glucose.: 238 mg/dL (08-18-22 @ 12:43)  POCT Blood Glucose.: 81 mg/dL (08-18-22 @ 08:50)  POCT Blood Glucose.: 122 mg/dL (08-17-22 @ 22:06)  POCT Blood Glucose.: 138 mg/dL (08-17-22 @ 17:36)  POCT Blood Glucose.: 133 mg/dL (08-17-22 @ 12:08)  POCT Blood Glucose.: 132 mg/dL (08-17-22 @ 08:29)  POCT Blood Glucose.: 199 mg/dL (08-16-22 @ 21:03)  POCT Blood Glucose.: 291 mg/dL (08-16-22 @ 17:29)                            7.1    9.75  )-----------( 315      ( 19 Aug 2022 07:27 )             22.3       08-19    135  |  93<L>  |  81<H>  ----------------------------<  194<H>  4.4   |  20<L>  |  4.80<H>    Ca    9.0      19 Aug 2022 07:29  Phos  4.6     08-19  Mg     2.1     08-19    TPro  6.4  /  Alb  2.6<L>  /  TBili  0.3  /  DBili  0.2  /  AST  22  /  ALT  21  /  AlkPhos  291<H>  08-19      eGFR: 13 mL/min/1.73m2 (19 Aug 2022 07:29)      07-27 Chol -- Direct LDL -- LDL calculated -- HDL -- Trig 118    Thyroid Function Tests:          A1C with Estimated Average Glucose Result: 6.0 % (06-30-22 @ 05:49)      Estimated Average Glucose: 126 mg/dL (06-30-22 @ 05:49)

## 2022-08-19 NOTE — PROGRESS NOTE ADULT - PROBLEM SELECTOR PLAN 5
Pt with B/L LE/UE swelling R>L, doppler studies negative of DVT. Continue Subq Heparin. Cannot use Eliquis given interaction with Dilantin. Edema greatly improving with fluid removal. Last HD done on 8/17/22. HD plan as outline above

## 2022-08-19 NOTE — PROGRESS NOTE ADULT - NS ATTEND AMEND GEN_ALL_CORE FT
still having seizures. meds adjusted as per neurology  most anticoagulation interacts with phenytoin- f/u re treating IJ Thrombus and afib  HD today

## 2022-08-20 NOTE — PROGRESS NOTE ADULT - ASSESSMENT
66 yr old Male with PMHx ESRD s/p permacath removal 5/10/22 s/p Rt DDRT 4/21/22,  CVA (2019), Afib on apixaban, seizure activity, CAD s/p stents, CABG (2020), HTN, HLD, DM on insulin, gastric/duodenal ulcer, recent hospitalization 4/28/22 -5/10/22 with weakness/anemia found to have perinephric hematoma requiring evacuation and repair of bleeding arterial anastomosis. Curently being treated for UTI with Levaquin Today after developing multiple sz episodes refractory to 5 mg versed IM (EMS) and 2 mg ativan IV in E.D. concerning for status epilepticus requiring intubation for hypoxic respiratory  failure. MICU Consult was called for hypoxic respiratory failure secondary to status epilepticus.  Nephrology consulted for renal failure.     A/P  DCD KTX on 04/21/22  Course complicated by DGF, was on HD until 4/29/22. Readmitted in May for anemia in the setting of large perinephric hematoma s/p evacuation and repair of arterial anastomosis on 5/02/22. Intra operative biopsy showed no rejection.  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   Now with failed allograft function, Remains dialysis dependent.   continue Immunosuppression per transplant team  last HD 08/17/22, plan for HD today  RRT per transplant team    transplant team on board   monitor BMP, U/O     Immunosuppression per transplant team  - Belatacept: 6/23-8/1/2022 discontinued  - on Sirolimus since 8/1/2022 based on level, Cellcept 250 BID, Prednisone 10 mg QD 8/16  - PPX:  Nystatin, bactrim, valcyte (MWF)        HTN   BP manage by transplant team    monitor BP closely          66 yr old Male with PMHx ESRD s/p permacath removal 5/10/22 s/p Rt DDRT 4/21/22,  CVA (2019), Afib on apixaban, seizure activity, CAD s/p stents, CABG (2020), HTN, HLD, DM on insulin, gastric/duodenal ulcer, recent hospitalization 4/28/22 -5/10/22 with weakness/anemia found to have perinephric hematoma requiring evacuation and repair of bleeding arterial anastomosis. Curently being treated for UTI with Levaquin Today after developing multiple sz episodes refractory to 5 mg versed IM (EMS) and 2 mg ativan IV in E.D. concerning for status epilepticus requiring intubation for hypoxic respiratory  failure. MICU Consult was called for hypoxic respiratory failure secondary to status epilepticus.  Nephrology consulted for renal failure.     A/P  DCD KTX on 04/21/22  Course complicated by DGF, was on HD until 4/29/22. Readmitted in May for anemia in the setting of large perinephric hematoma s/p evacuation and repair of arterial anastomosis on 5/02/22. Intra operative biopsy showed no rejection.  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   Now with failed allograft function, Remains dialysis dependent.   continue Immunosuppression per transplant team  last HD 08/19/22, plan for HD Monday  transplant team on board   monitor BMP, U/O     Immunosuppression per transplant team  - Belatacept: 6/23-8/1/2022 discontinued  - on Sirolimus since 8/1/2022 based on level, Cellcept 250 BID, Prednisone 10 mg QD 8/16  - PPX:  Nystatin, bactrim, valcyte (MWF)        HTN   BP manage by transplant team    monitor BP closely

## 2022-08-20 NOTE — PROGRESS NOTE ADULT - SUBJECTIVE AND OBJECTIVE BOX
--------------------------------------------------------------------------------  Chief Complaint:  No new acute symptoms. Denies any pain    24 hour events/subjective:  Reviewed      PAST HISTORY  --------------------------------------------------------------------------------  No significant changes to PMH, PSH, FHx, SHx, unless otherwise noted    ALLERGIES & MEDICATIONS  --------------------------------------------------------------------------------  Allergies    No Known Allergies    Intolerances      Standing Inpatient Medications  amLODIPine   Tablet 10 milliGRAM(s) Oral daily  brivaracetam 50 milliGRAM(s) Oral two times a day  chlorhexidine 2% Cloths 1 Application(s) Topical <User Schedule>  doxazosin 4 milliGRAM(s) Oral <User Schedule>  epoetin cortney-epbx (RETACRIT) Injectable 60528 Unit(s) IV Push every 7 days  finasteride 5 milliGRAM(s) Oral daily  heparin   Injectable 5000 Unit(s) SubCutaneous every 12 hours  hydrALAZINE 50 milliGRAM(s) Oral three times a day  insulin glargine Injectable (LANTUS) 7 Unit(s) SubCutaneous at bedtime  insulin lispro (ADMELOG) corrective regimen sliding scale   SubCutaneous three times a day before meals  insulin lispro (ADMELOG) corrective regimen sliding scale   SubCutaneous at bedtime  insulin lispro Injectable (ADMELOG) 9 Unit(s) SubCutaneous three times a day before meals  levothyroxine 50 MICROGram(s) Oral daily  magnesium sulfate  IVPB 1 Gram(s) IV Intermittent once  melatonin 6 milliGRAM(s) Oral at bedtime  mycophenolate mofetil 250 milliGRAM(s) Oral <User Schedule>  nystatin    Suspension 992814 Unit(s) Oral four times a day  pantoprazole    Tablet 40 milliGRAM(s) Oral <User Schedule>  phenytoin   Capsule 200 milliGRAM(s) Oral two times a day  polyethylene glycol 3350 17 Gram(s) Oral daily  predniSONE   Tablet 10 milliGRAM(s) Oral daily  senna 2 Tablet(s) Oral at bedtime  sirolimus 1 milliGRAM(s) Oral once  trimethoprim   80 mG/sulfamethoxazole 400 mG 1 Tablet(s) Oral daily  valGANciclovir 450 milliGRAM(s) Oral <User Schedule>    PRN Inpatient Medications  acetaminophen     Tablet .. 650 milliGRAM(s) Oral every 6 hours PRN  diphenhydrAMINE 25 milliGRAM(s) Oral at bedtime PRN      REVIEW OF SYSTEMS  --------------------------------------------------------------------------------  Noted skin induraion leftlower lateral abdominal wall- denies any pain    VITALS/PHYSICAL EXAM  --------------------------------------------------------------------------------  T(C): 37.1 (08-20-22 @ 09:00), Max: 37.1 (08-20-22 @ 09:00)  HR: 75 (08-20-22 @ 09:00) (64 - 77)  BP: 130/59 (08-20-22 @ 09:00) (130/59 - 173/62)  RR: 18 (08-20-22 @ 09:00) (18 - 18)  SpO2: 93% (08-20-22 @ 09:00) (93% - 100%)    08-19-22 @ 07:01  -  08-20-22 @ 07:00  --------------------------------------------------------  IN: 690 mL / OUT: 2000 mL / NET: -1310 mL      Physical Exam:  	Gen: NAD,   	HEENT: PERRL, supple neck, clear oropharynx  	Pulm: CTA B/L  	CV: RRR, S1S2; no rub  	Abd: +BS, soft, nontender/nondistended  Noted indurated area over left lower lateral abdominal wall                      Transplant: No tenderness   	UE:  no edema; no asterixis  	LE: no acute signs  	Neuro: No focal deficits   	Psych: Normal affect and mood  	Skin: Warm, without rashes      LABS/STUDIES  --------------------------------------------------------------------------------              7.3    8.43  >-----------<  346      [08-20-22 @ 07:16]              23.5     136  |  94  |  53  ----------------------------<  182      [08-20-22 @ 07:12]  4.0   |  26  |  3.59        Ca     8.9     [08-20-22 @ 07:12]      Mg     1.9     [08-20-22 @ 07:12]      Phos  3.2     [08-20-22 @ 07:12]    TPro  6.4  /  Alb  2.7  /  TBili  0.3  /  DBili  0.2  /  AST  21  /  ALT  22  /  AlkPhos  285  [08-20-22 @ 07:12]    PT/INR: PT 14.9 , INR 1.29       [08-20-22 @ 07:16]  PTT: 32.2       [08-19-22 @ 07:28]      Creatinine Trend:  SCr 3.59 [08-20 @ 07:12]  SCr 4.80 [08-19 @ 07:29]  SCr 3.50 [08-18 @ 06:20]  SCr 4.59 [08-17 @ 08:12]  SCr 3.59 [08-16 @ 07:31]    Sirolimus (Rapamycin) Level, Serum: 2.8 ng/mL (08-19 @ 07:30)  Sirolimus (Rapamycin) Level, Serum: 2.6 ng/mL (08-18 @ 06:17)  Sirolimus (Rapamycin) Level, Serum: 2.8 ng/mL (08-17 @ 07:59)  Sirolimus (Rapamycin) Level, Serum: 2.0 ng/mL (08-16 @ 07:05)    Urinalysis - [08-16-22 @ 18:49]      Color Yellow / Appearance Slightly Turbid / SG 1.018 / pH 8.5      Gluc 200 mg/dL / Ketone Negative  / Bili Negative / Urobili Negative       Blood Large / Protein >600 / Leuk Est Moderate / Nitrite Positive      RBC 13 / WBC 40 / Hyaline 7 / Gran  / Sq Epi  / Non Sq Epi 4 / Bacteria Moderate      Iron 17, TIBC 171, %sat 10      [05-02-22 @ 13:03]  Ferritin 6336      [07-27-22 @ 13:00]  PTH -- (Ca 9.2)      [02-05-22 @ 10:13]   294  HbA1c 6.0      [02-21-20 @ 08:53]  TSH 4.69      [07-06-22 @ 18:36]  Lipid: chol --, , HDL --, LDL --      [07-27-22 @ 13:00]    HBsAg Nonreact      [07-25-22 @ 11:51]  HCV 0.18, Nonreact      [07-25-22 @ 11:51]    Culture - Blood (08.14.22 @ 15:49)    Specimen Source: .Blood Blood-Peripheral    Culture Results:   No Growth Final

## 2022-08-20 NOTE — PROGRESS NOTE ADULT - ASSESSMENT
66M with h/o DM type II, HTN, CAD s/p CABG in 2020, Afib on Eliquis, CVA in 2019 due to Afib, Seizure d/o (last episode was on 4/8/22), h/o GI bleed in 2/2022 EGD with duodenal ulcer and ESRD on HD s/p R DDRT from DCD donor on 4/21/22 complicated by DGF requiring HD until 4/29/22 who presented with status epilepticus in setting of UTI/antibiotics and sub-therapeutic valproic acid level. Transferred to SICU on 7/25 with signs of sepsis, now with refractory seizures    Seizure Disorder:  - Neuro/Epilepsy Teams following. Avoid Fycompa 2/2 agitation in past  - 1 min seizure with clonic movements noted on EEG 8/16.   - Continue additional anti-seizure medication Briviact 50mg BID with additional 50mg post each HD session  - No further seizures noted.  EEG 8/16-8/18  - prior MRI head 6/27 with less concerns for PRES, likely ischemic changes  - o/n CTH 8/3 with no acute findings  - replete lytes aggressively      R DCD DDRT 4/21/22 c/b 2A ACR  - 2A ACR: s/p pulse steroids. Continue Prednisone 10mg QD  - Deferring right psoas muscle/fascia biopsy as wound is not worsening  - HD/UF daily as per nephrology for fluid overload/anuria  - Scrotal support  - 8/14 surveillance BCx negative  - graft doppler stable, stable perinephric collection last US 8/5 and CT scan 8/8  - S/P removal of ureteral stent on 7/12  - Continue Doxazosin/Proscar for BPH  - OOB with RN/PT  - Renal Soft diet as tolerated with PO supplements  - 8/8 Punch biopsy wound Culture: ngtd. Negative for PTLD  - Start Keflex for cellulitis (erythema around abd wound).   - DVT ppx with SQH  - fungitel negative  - LE studies WNL  - Benadryl PRN for sleep  - Daily CRP    Immunosuppression:   - Belatacept: 6/23-8/1 discontinued  - on Sirolimus based on level, Cellcept 250 BID, Pred 20mg QD  - PPX:  Nystatin, bactrim, valcyte (MWF)    DM  - controlled  - Fingerstick ac and qhs  - Continue Lantus 7u qpm and premeal Humalog with SSI  - Endo following    AFib/R IJ DVT   - Eliquis with ~40% less efficacy while on fosphenytoin.    - Lovenox held for ANA and transaminitis   - rate controlled  - will discuss A/C plans daily    HTN:  - Amlodipine/Doxazosin/Hydralazline  - off Coreg due to bradycardia

## 2022-08-20 NOTE — PROGRESS NOTE ADULT - SUBJECTIVE AND OBJECTIVE BOX
[facility] [place] [facility]   MD NINA MASON MD KRISTINE SOLTANPOUR, WALDO PALMA    TEL:  OFFICE: [phone]  From 5pm-7am Answering Service [phone]    -- RENAL FOLLOW UP NOTE ---Date of Service 08-20-22 @ 17:42    Patient is a 66y old  Male who presents with a chief complaint of Status Epilepticus (20 Aug 2022 09:52)      Patient seen and examined at bedside. No chest pain/sob    VITALS:  T(F): 98.4 (08-20-22 @ 17:20), Max: 98.7 (08-20-22 @ 09:00)  HR: 76 (08-20-22 @ 17:20)  BP: 174/62 (08-20-22 @ 17:20)  RR: 18 (08-20-22 @ 17:20)  SpO2: 97% (08-20-22 @ 17:20)  Wt(kg): --    08-19 @ 07:01  -  08-20 @ 07:00  --------------------------------------------------------  IN: 690 mL / OUT: 2000 mL / NET: -1310 mL          PHYSICAL EXAM:  General: NAD  Neck: No JVD  Respiratory: CTAB, no wheezes, rales or rhonchi  Cardiovascular: S1, S2, RRR  Gastrointestinal: BS+, soft, NT/ND  Extremities: No peripheral edema    Hospital Medications:   MEDICATIONS  (STANDING):  amLODIPine   Tablet 10 milliGRAM(s) Oral daily  brivaracetam 50 milliGRAM(s) Oral two times a day  cephalexin 500 milliGRAM(s) Oral every 12 hours  chlorhexidine 2% Cloths 1 Application(s) Topical <User Schedule>  doxazosin 4 milliGRAM(s) Oral <User Schedule>  epoetin cortney-epbx (RETACRIT) Injectable 42509 Unit(s) IV Push every 7 days  finasteride 5 milliGRAM(s) Oral daily  heparin   Injectable 5000 Unit(s) SubCutaneous every 12 hours  hydrALAZINE 50 milliGRAM(s) Oral three times a day  insulin glargine Injectable (LANTUS) 7 Unit(s) SubCutaneous at bedtime  insulin lispro (ADMELOG) corrective regimen sliding scale   SubCutaneous three times a day before meals  insulin lispro (ADMELOG) corrective regimen sliding scale   SubCutaneous at bedtime  insulin lispro Injectable (ADMELOG) 9 Unit(s) SubCutaneous three times a day before meals  levothyroxine 50 MICROGram(s) Oral daily  melatonin 6 milliGRAM(s) Oral at bedtime  mycophenolate mofetil 250 milliGRAM(s) Oral <User Schedule>  nystatin    Suspension 863455 Unit(s) Oral four times a day  pantoprazole    Tablet 40 milliGRAM(s) Oral <User Schedule>  phenytoin   Capsule 200 milliGRAM(s) Oral two times a day  polyethylene glycol 3350 17 Gram(s) Oral daily  predniSONE   Tablet 10 milliGRAM(s) Oral daily  senna 2 Tablet(s) Oral at bedtime  trimethoprim   80 mG/sulfamethoxazole 400 mG 1 Tablet(s) Oral daily  valGANciclovir 450 milliGRAM(s) Oral <User Schedule>      LABS:  08-20    136  |  94<L>  |  53<H>  ----------------------------<  182<H>  4.0   |  26  |  3.59<H>    Ca    8.9      20 Aug 2022 07:12  Phos  3.2     08-20  Mg     1.9     08-20    TPro  6.4  /  Alb  2.7<L>  /  TBili  0.3  /  DBili  0.2  /  AST  21  /  ALT  22  /  AlkPhos  285<H>  08-20    Creatinine Trend: 3.59 <--, 4.80 <--, 3.50 <--, 4.59 <--, 3.59 <--, 4.24 <--, 3.17 <--    Phosphorus Level, Serum: 3.2 mg/dL (08-20 @ 07:12)  Albumin, Serum: 2.7 g/dL (08-20 @ 07:12)                              7.3    8.43  )-----------( 346      ( 20 Aug 2022 07:16 )             23.5     Urine Studies:  Urinalysis - [08-16-22 @ 18:49]      Color Yellow / Appearance Slightly Turbid / SG 1.018 / pH 8.5      Gluc 200 mg/dL / Ketone Negative  / Bili Negative / Urobili Negative       Blood Large / Protein >600 / Leuk Est Moderate / Nitrite Positive      RBC 13 / WBC 40 / Hyaline 7 / Gran  / Sq Epi  / Non Sq Epi 4 / Bacteria Moderate      Iron 17, TIBC 171, %sat 10      [05-02-22 @ 13:03]  Ferritin 6336      [07-27-22 @ 13:00]  PTH -- (Ca 9.2)      [02-05-22 @ 10:13]   294  HbA1c 6.0      [02-21-20 @ 08:53]  TSH 4.69      [07-06-22 @ 18:36]  Lipid: chol --, , HDL --, LDL --      [07-27-22 @ 13:00]    HBsAg Nonreact      [07-25-22 @ 11:51]  HCV 0.18, Nonreact      [07-25-22 @ 11:51]      RADIOLOGY & ADDITIONAL STUDIES:

## 2022-08-20 NOTE — PROGRESS NOTE ADULT - TIME BILLING
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 No  DSA.   s/p pulse dose steroids. remains anuric and is dialysis dependent.   Patient  underwent IR guided HD catheter exchange on 8/8/22.  Last Ihemodialysis was on 8/19 with fluid removal  IS meds- on Sirolimus , target trough 4-6 ng/ml  mg po bid and steroid.   AMS , seizures - Noted recent medication changes. Improved mental status, communicative Neurology following.  DM -  on Lantus and lispro.   Cellulitis over RLQ - Was on Vancomycin . CT A/P on 8/6 revealed a retroperitoneal hematoma. Txp USG 8/6  - Patent renal artery vasculature. Stable appearance the transplant kidney .Persistent elevated resistive indices. Diffuse abdominal wall edematous changes right lower quadrant without focal collection, cultures from 8/3  negative so far. Skin bx done on 8/8/22 noted findings. ID follow up noted.  Improving.   Will maintain on antibiotics, Will repeat ultrasound if remains similar.  I was present during and reviewed clinical and lab data as well as assessment and plan as documented by the house staff as noted. Please contact if any additional questions with any change in clinical condition or on availability of any additional information or reports.

## 2022-08-20 NOTE — PROVIDER CONTACT NOTE (OTHER) - ASSESSMENT
patient hypertensive. hydralazine dosing increased yesterday evening, patient is asymptomatic and seems to trend hypertensive around this time each day.

## 2022-08-20 NOTE — PROGRESS NOTE ADULT - NS ATTEND AMEND GEN_ALL_CORE FT
Last seizure 3 days ago. meds adjusted as per neurology and started on phenytoin  most anticoagulation interacts with phenytoin- f/u re treating IJ Thrombus and afib  HD yesterday  abdominal wound/skin improving. Off abx for a few days. cont to monitor

## 2022-08-20 NOTE — PROGRESS NOTE ADULT - SUBJECTIVE AND OBJECTIVE BOX
Transplant Surgery - Multidisciplinary Progress Note  --------------------------------------------------------------  DDRT     4/21/2022            Present:  Patient seen with multidisciplinary team including Transplant Surgeon: Dr. Mann,  Nephrologist: Dr. Alfred, [facility], and unit RN during am rounds. Disciplines not in attendance will be notified of the plan.      HPI: 66M with PMH: DM type II, HTN, CAD s/p CABG in 2020, AFib on Eliquis, CVA in 2019 due to Afib, Seizure d/o following CVA, last episode was on 4/8/22, h/o GIB in 2/2022 EGD with duodenal ulcer.  ESRD due to DM was on HD since 2019.     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  Donor was 58 , KDPI 82%, DCD, single vessels and ureter, HLA mismatch 1, 2, 2. No DSA, cPRA 0%. 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 Re-admitted in May for anemia in the setting of large perinephric hematoma s/p evacuation and repair of arterial anastomosis on 5/2/22. Intra operative biopsy showed no rejection, Creatinine ranging ~2mg/dL.    In Rehab:  He was recently dx with klebsiella UTI with Ertapenem - then switched to Levaquin    He also had parainfluenza pneumonia. Was at rehab progressing well.      Hospital course:  - 6/10: transferred from rehab facility for seizure status epilepticus. Intubated for respiratory protection and transferred to MICU.  EEG showed no seizure activity. Neuro consulted.   - 6/11: Extubated. Found to have R pleural effusion. s/p Thoracentesis 1.3L. Eliquis changed to heparin drip.  Post procedure drop in H&H. 5u PRBC, 2 u FFP.   - 6/11 evening: Transferred to SICU from MICU for further care in setting of hypoxia, significant R pleural effusion, anemia and hemodynamic instability  - 6/11 Intubated at 9pm and sedated on Precedex.  CT placed, 600ml blood drained.  1L total overnight, started pressors  - 6/11 Received Protamine for PTT >100 (was on Heparin drip started for AF), 2 u FFP and 5u PRBC total  - 6/13 s/p VATS 2.2L old blood removed; second chest tube placed  - 6/18-19: chest tubes removed  - 6/21: ?PRES vs. chronic ischemic changes on MRI, off Envarsus, switched to Belatacept 6/23 with good graft function  - 6/25: Tx to SICU for refractory seizures, improved with IV antiepileptic regimen  - 7/10: Downgraded from SICU to floor.   - 7/11: OR-->URETERAL STENT REMOVED  - 7/15: ESBL Kleb UTI (50-90K), completed Ertapenem x 3 Days  - 7/25: Tx to SICU for Sepsis/ elevated LFTS  - 7/27: Shiley placed for HD  - 7/28: ongoing fevers -> started Ertapenem/Fluc  - 7/29: HD restarted + 1U PRBC.  IR bx with 2A rejection, started pulse steroids. LFTs have improved  - 8/4: refractory seizures    Interval events:   - no acute overnight events  - AOX3 this am  - Last HD yesterday, removed 2 L.   - Erythema around wound significantly improved. Off Vanco since 8/18.  - HTNsive w/ SBP in 170s yesterday, increased hydralazine to 50 TID yesterday.    Immunosuppression  Maintenance: Sirolimus aper level,,  Cellcept to 250 BID, Pred 10mg po qd   Ongoing monitoring for signs of rejection.    Potential Discharge date: pending clinical improvement.   Education:  Medications  Plan of care:  See Below      MEDICATIONS  (STANDING):  amLODIPine   Tablet 10 milliGRAM(s) Oral daily  brivaracetam 50 milliGRAM(s) Oral two times a day  chlorhexidine 2% Cloths 1 Application(s) Topical <User Schedule>  doxazosin 4 milliGRAM(s) Oral <User Schedule>  epoetin cortney-epbx (RETACRIT) Injectable 44152 Unit(s) IV Push every 7 days  finasteride 5 milliGRAM(s) Oral daily  heparin   Injectable 5000 Unit(s) SubCutaneous every 12 hours  hydrALAZINE 50 milliGRAM(s) Oral three times a day  insulin glargine Injectable (LANTUS) 7 Unit(s) SubCutaneous at bedtime  insulin lispro (ADMELOG) corrective regimen sliding scale   SubCutaneous three times a day before meals  insulin lispro (ADMELOG) corrective regimen sliding scale   SubCutaneous at bedtime  insulin lispro Injectable (ADMELOG) 9 Unit(s) SubCutaneous three times a day before meals  levothyroxine 50 MICROGram(s) Oral daily  magnesium sulfate  IVPB 1 Gram(s) IV Intermittent once  melatonin 6 milliGRAM(s) Oral at bedtime  mycophenolate mofetil 250 milliGRAM(s) Oral <User Schedule>  nystatin    Suspension 449028 Unit(s) Oral four times a day  pantoprazole    Tablet 40 milliGRAM(s) Oral <User Schedule>  phenytoin   Capsule 200 milliGRAM(s) Oral two times a day  polyethylene glycol 3350 17 Gram(s) Oral daily  predniSONE   Tablet 10 milliGRAM(s) Oral daily  senna 2 Tablet(s) Oral at bedtime  sirolimus 1 milliGRAM(s) Oral once  trimethoprim   80 mG/sulfamethoxazole 400 mG 1 Tablet(s) Oral daily  valGANciclovir 450 milliGRAM(s) Oral <User Schedule>    MEDICATIONS  (PRN):  acetaminophen     Tablet .. 650 milliGRAM(s) Oral every 6 hours PRN Mild Pain (1 - 3)  diphenhydrAMINE 25 milliGRAM(s) Oral at bedtime PRN Insomnia      PAST MEDICAL & SURGICAL HISTORY:  Hypertension      Diabetes      Dyslipidemia      CAD (Coronary Artery Disease)  with Stents in 06/2009 and 6/2019, s/p off-pump C3L on 8/13/20      Hypothyroidism      CVA (cerebral vascular accident)  12/13/19 with residual bilateral weakness      Anemia      PEG (percutaneous endoscopic gastrostomy) status  removed July 2020      Intubation of airway performed without difficulty  Dec 2019  CVA c/b status epilepticus requiring intubation and PEG in 12/2019-      ESRD on dialysis  M/W/F      Seizures  after CVA, last seizure was last week 4/07/22      History of insertion of stent into coronary artery bypass graft  2009 and 2019      S/P CABG x 3  off pump C3L on 8/13/20          Vital Signs Last 24 Hrs  T(C): 37.1 (20 Aug 2022 09:00), Max: 37.1 (20 Aug 2022 09:00)  T(F): 98.7 (20 Aug 2022 09:00), Max: 98.7 (20 Aug 2022 09:00)  HR: 75 (20 Aug 2022 09:00) (64 - 77)  BP: 130/59 (20 Aug 2022 09:00) (130/59 - 173/62)  BP(mean): 86 (20 Aug 2022 07:17) (84 - 99)  RR: 18 (20 Aug 2022 09:00) (18 - 18)  SpO2: 93% (20 Aug 2022 09:00) (93% - 100%)    Parameters below as of 20 Aug 2022 09:00  Patient On (Oxygen Delivery Method): room air        I&O's Summary    19 Aug 2022 07:01  -  20 Aug 2022 07:00  --------------------------------------------------------  IN: 690 mL / OUT: 2000 mL / NET: -1310 mL                              7.3    8.43  )-----------( 346      ( 20 Aug 2022 07:16 )             23.5     08-20    136  |  94<L>  |  53<H>  ----------------------------<  182<H>  4.0   |  26  |  3.59<H>    Ca    8.9      20 Aug 2022 07:12  Phos  3.2     08-20  Mg     1.9     08-20    TPro  6.4  /  Alb  2.7<L>  /  TBili  0.3  /  DBili  0.2  /  AST  21  /  ALT  22  /  AlkPhos  285<H>  08-20          Culture - Blood (collected 08-14-22 @ 15:49)  Source: .Blood Blood-Peripheral  Final Report (08-19-22 @ 21:01):    No Growth Final          REVIEW OF SYSTEMS  --------------------------------------------------------------------------------  unable to obtain full accurate ROS, but denies CP, SOB, HA, dizziness, N/V/D/C, abd discomfort.     PHYSICAL EXAM:  Constitutional: awake, weak  Eyes: Anicteric  ENMT: nc/at  Respiratory: not tachypneic, non-labored on RA    Cardiovascular: normotensive, regular rate on monitor   Gastrointestinal: Soft, non distended, nontender, RLQ incisional/groin ecchymotic.   Genitourinary: anuric on HD   Extremities: SCD's in place and working bilaterally, overall with improving edema  Vascular: Palpable dp pulses bilaterally  Neurological: AAOx3  Skin:  As noted above. No ulcerations   Musculoskeletal: Moving all extremities  Psychiatric: overall calm, following directions

## 2022-08-21 NOTE — PROGRESS NOTE ADULT - SUBJECTIVE AND OBJECTIVE BOX
seen earlier today     Chief Complaint: Type 2 Diabetes Mellitus     INTERVAL HX: son at bedside, pt tolerating po, pt ate well at breakfast today. received 2 units admelog instead of 9 w/ dinner as dinner BG tightly controlled       Review of Systems:  General: As above  Cardiovascular: No chest pain  Respiratory: No SOB  GI: No nausea, vomiting  Endocrine: no  S&Sx of hypoglycemia    Allergies    No Known Allergies    Intolerances      MEDICATIONS  (STANDING):  amLODIPine   Tablet 10 milliGRAM(s) Oral daily  brivaracetam 50 milliGRAM(s) Oral two times a day  cephalexin 500 milliGRAM(s) Oral every 12 hours  chlorhexidine 2% Cloths 1 Application(s) Topical <User Schedule>  doxazosin 4 milliGRAM(s) Oral <User Schedule>  epoetin cortney-epbx (RETACRIT) Injectable 53712 Unit(s) IV Push every 7 days  finasteride 5 milliGRAM(s) Oral daily  heparin   Injectable 5000 Unit(s) SubCutaneous every 12 hours  hydrALAZINE 75 milliGRAM(s) Oral three times a day  insulin glargine Injectable (LANTUS) 8 Unit(s) SubCutaneous at bedtime  insulin lispro (ADMELOG) corrective regimen sliding scale   SubCutaneous three times a day before meals  insulin lispro (ADMELOG) corrective regimen sliding scale   SubCutaneous at bedtime  insulin lispro Injectable (ADMELOG) 9 Unit(s) SubCutaneous three times a day before meals  levothyroxine 50 MICROGram(s) Oral daily  melatonin 6 milliGRAM(s) Oral at bedtime  mycophenolate mofetil 250 milliGRAM(s) Oral <User Schedule>  nystatin    Suspension 624646 Unit(s) Oral four times a day  pantoprazole    Tablet 40 milliGRAM(s) Oral <User Schedule>  phenytoin   Capsule 200 milliGRAM(s) Oral two times a day  polyethylene glycol 3350 17 Gram(s) Oral daily  predniSONE   Tablet 10 milliGRAM(s) Oral daily  senna 2 Tablet(s) Oral at bedtime  sirolimus 6 milliGRAM(s) Oral <User Schedule>  trimethoprim   80 mG/sulfamethoxazole 400 mG 1 Tablet(s) Oral daily  valGANciclovir 450 milliGRAM(s) Oral <User Schedule>        finasteride   5 milliGRAM(s) Oral (08-21-22 @ 13:23)    insulin glargine Injectable (LANTUS)   7 Unit(s) SubCutaneous (08-20-22 @ 22:07)    insulin lispro (ADMELOG) corrective regimen sliding scale   3 Unit(s) SubCutaneous (08-21-22 @ 13:22)   3 Unit(s) SubCutaneous (08-21-22 @ 09:23)    insulin lispro Injectable (ADMELOG)   9 Unit(s) SubCutaneous (08-21-22 @ 13:23)   9 Unit(s) SubCutaneous (08-21-22 @ 09:23)    insulin lispro Injectable (ADMELOG).   2 Unit(s) SubCutaneous (08-20-22 @ 17:56)    levothyroxine   50 MICROGram(s) Oral (08-21-22 @ 05:47)    predniSONE   Tablet   10 milliGRAM(s) Oral (08-21-22 @ 05:46)        PHYSICAL EXAM:  VITALS: T(C): 37 (08-21-22 @ 12:45)  T(F): 98.6 (08-21-22 @ 12:45), Max: 98.8 (08-21-22 @ 08:48)  HR: 76 (08-21-22 @ 12:45) (76 - 81)  BP: 167/65 (08-21-22 @ 12:45) (157/68 - 185/88)  RR:  (18 - 18)  SpO2:  (95% - 97%)  Wt(kg): --  GENERAL: male laying in bed in NAD  Respiratory: Respirations unlabored   Extremities: Warm, no edema  NEURO: Alert , appropriate       LABS:    POCT Blood Glucose.: 255 mg/dL (08-21-22 @ 12:30)  POCT Blood Glucose.: 290 mg/dL (08-21-22 @ 08:31)  POCT Blood Glucose.: 192 mg/dL (08-20-22 @ 21:37)  POCT Blood Glucose.: 122 mg/dL (08-20-22 @ 17:22)  POCT Blood Glucose.: 170 mg/dL (08-20-22 @ 12:44)  POCT Blood Glucose.: 182 mg/dL (08-20-22 @ 08:29)  POCT Blood Glucose.: 221 mg/dL (08-19-22 @ 21:12)  POCT Blood Glucose.: 140 mg/dL (08-19-22 @ 17:26)  POCT Blood Glucose.: 204 mg/dL (08-19-22 @ 12:18)  POCT Blood Glucose.: 203 mg/dL (08-19-22 @ 08:21)  POCT Blood Glucose.: 251 mg/dL (08-18-22 @ 21:08)  POCT Blood Glucose.: 220 mg/dL (08-18-22 @ 17:23)                            7.7    8.62  )-----------( 410      ( 21 Aug 2022 06:44 )             24.7       08-21    134<L>  |  92<L>  |  74<H>  ----------------------------<  259<H>  4.3   |  24  |  4.65<H>    Ca    9.2      21 Aug 2022 06:44  Phos  4.0     08-21  Mg     2.3     08-21    TPro  7.2  /  Alb  2.9<L>  /  TBili  0.3  /  DBili  0.2  /  AST  22  /  ALT  25  /  AlkPhos  322<H>  08-21      eGFR: 13 mL/min/1.73m2 (21 Aug 2022 06:44)      07-27 Chol -- Direct LDL -- LDL calculated -- HDL -- Trig 118    Thyroid Function Tests:          A1C with Estimated Average Glucose Result: 6.0 % (06-30-22 @ 05:49)      Estimated Average Glucose: 126 mg/dL (06-30-22 @ 05:49)

## 2022-08-21 NOTE — PROGRESS NOTE ADULT - SUBJECTIVE AND OBJECTIVE BOX
[facility]   MD NINA MASON MD KRISTINE SOLTANPOUR, WALDO PALMA    TEL:  OFFICE: [phone]  From 5pm-7am Answering Service [phone]    -- RENAL FOLLOW UP NOTE ---Date of Service 08-21-22 @ 18:56    Patient is a 66y old  Male who presents with a chief complaint of Status Epilepticus (21 Aug 2022 14:43)      Patient seen and examined at bedside. No chest pain/sob    VITALS:  T(F): 98.1 (08-21-22 @ 18:13), Max: 98.8 (08-21-22 @ 08:48)  HR: 75 (08-21-22 @ 18:13)  BP: 171/73 (08-21-22 @ 18:13)  RR: 18 (08-21-22 @ 18:13)  SpO2: 95% (08-21-22 @ 18:13)  Wt(kg): --    08-20 @ 07:01  -  08-21 @ 07:00  --------------------------------------------------------  IN: 1030 mL / OUT: 50 mL / NET: 980 mL    08-21 @ 07:01  -  08-21 @ 18:56  --------------------------------------------------------  IN: 630 mL / OUT: 0 mL / NET: 630 mL          PHYSICAL EXAM:  General: NAD  Neck: No JVD  Respiratory: CTAB, no wheezes, rales or rhonchi  Cardiovascular: S1, S2, RRR  Gastrointestinal: BS+, soft, NT/ND  Extremities: No peripheral edema    Hospital Medications:   MEDICATIONS  (STANDING):  amLODIPine   Tablet 10 milliGRAM(s) Oral daily  brivaracetam 50 milliGRAM(s) Oral two times a day  cephalexin 500 milliGRAM(s) Oral every 12 hours  chlorhexidine 2% Cloths 1 Application(s) Topical <User Schedule>  doxazosin 4 milliGRAM(s) Oral <User Schedule>  epoetin cortney-epbx (RETACRIT) Injectable 20531 Unit(s) IV Push every 7 days  finasteride 5 milliGRAM(s) Oral daily  heparin   Injectable 5000 Unit(s) SubCutaneous every 12 hours  hydrALAZINE 75 milliGRAM(s) Oral three times a day  insulin glargine Injectable (LANTUS) 8 Unit(s) SubCutaneous at bedtime  insulin lispro (ADMELOG) corrective regimen sliding scale   SubCutaneous three times a day before meals  insulin lispro (ADMELOG) corrective regimen sliding scale   SubCutaneous at bedtime  insulin lispro Injectable (ADMELOG) 9 Unit(s) SubCutaneous three times a day before meals  levothyroxine 50 MICROGram(s) Oral daily  melatonin 6 milliGRAM(s) Oral at bedtime  mycophenolate mofetil 250 milliGRAM(s) Oral <User Schedule>  nystatin    Suspension 456026 Unit(s) Oral four times a day  pantoprazole    Tablet 40 milliGRAM(s) Oral <User Schedule>  phenytoin   Capsule 200 milliGRAM(s) Oral two times a day  polyethylene glycol 3350 17 Gram(s) Oral daily  predniSONE   Tablet 10 milliGRAM(s) Oral daily  senna 2 Tablet(s) Oral at bedtime  sirolimus 6 milliGRAM(s) Oral <User Schedule>  trimethoprim   80 mG/sulfamethoxazole 400 mG 1 Tablet(s) Oral daily  valGANciclovir 450 milliGRAM(s) Oral <User Schedule>      LABS:  08-21    134<L>  |  92<L>  |  74<H>  ----------------------------<  259<H>  4.3   |  24  |  4.65<H>    Ca    9.2      21 Aug 2022 06:44  Phos  4.0     08-21  Mg     2.3     08-21    TPro  7.2  /  Alb  2.9<L>  /  TBili  0.3  /  DBili  0.2  /  AST  22  /  ALT  25  /  AlkPhos  322<H>  08-21    Creatinine Trend: 4.65 <--, 3.59 <--, 4.80 <--, 3.50 <--, 4.59 <--, 3.59 <--, 4.24 <--    Albumin, Serum: 2.9 g/dL (08-21 @ 06:44)  Phosphorus Level, Serum: 4.0 mg/dL (08-21 @ 06:44)                              7.7    8.62  )-----------( 410      ( 21 Aug 2022 06:44 )             24.7     Urine Studies:  Urinalysis - [08-16-22 @ 18:49]      Color Yellow / Appearance Slightly Turbid / SG 1.018 / pH 8.5      Gluc 200 mg/dL / Ketone Negative  / Bili Negative / Urobili Negative       Blood Large / Protein >600 / Leuk Est Moderate / Nitrite Positive      RBC 13 / WBC 40 / Hyaline 7 / Gran  / Sq Epi  / Non Sq Epi 4 / Bacteria Moderate      Iron 17, TIBC 171, %sat 10      [05-02-22 @ 13:03]  Ferritin 6336      [07-27-22 @ 13:00]  PTH -- (Ca 9.2)      [02-05-22 @ 10:13]   294  HbA1c 6.0      [02-21-20 @ 08:53]  TSH 4.69      [07-06-22 @ 18:36]  Lipid: chol --, , HDL --, LDL --      [07-27-22 @ 13:00]    HBsAg Nonreact      [07-25-22 @ 11:51]  HCV 0.18, Nonreact      [07-25-22 @ 11:51]      RADIOLOGY & ADDITIONAL STUDIES:

## 2022-08-21 NOTE — PROGRESS NOTE ADULT - TIME BILLING
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 No  DSA.   s/p pulse dose steroids. remains oliguric and dialysis dependent.   Last hemodialysis was on 8/19 with fluid removal  IS meds- on Sirolimus , target trough 4-6 ng/ml  mg po bid and prednisone.   AMS , seizures - Noted recent medication changes. Improved mental status, communicative Neurology following.  DM -  on Lantus and lispro. Adjusted for optimized glycemic control, monitor PO intake  Cellulitis over RLQ - Was on Vancomycin . CT A/P on 8/6 revealed a retroperitoneal hematoma. Txp USG 8/6  - Patent renal artery vasculature. Stable appearance the transplant kidney .Persistent elevated resistive indices. Diffuse abdominal wall edematous changes right lower quadrant without focal collection, cultures from 8/3  negative so far. Skin bx done on 8/8/22 noted findings. ID follow up noted.    Will maintain on antibiotics, Will repeat ultrasound if remains similar.  Will monitor for improvement  I was present during and reviewed clinical and lab data as well as assessment and plan as documented . Please contact if any additional questions with any change in clinical condition or on availability of any additional information or reports. 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 No  DSA.   s/p pulse dose steroids. remains oliguric and dialysis dependent.   Last hemodialysis was on 8/19 with fluid removal  Will schedule hemodialysis with RBC on 8/22/22  IS meds- on Sirolimus , target trough 4-6 ng/ml  mg po bid and prednisone.   AMS , seizures - Noted recent medication changes. Improved mental status, communicative Neurology following.  DM -  on Lantus and lispro. Adjusted for optimized glycemic control, monitor PO intake  HTN- Suboptimal control, will adjust hydralazine dose  Cellulitis over RLQ - Was on Vancomycin . CT A/P on 8/6 revealed a retroperitoneal hematoma. Txp USG 8/6  - Patent renal artery vasculature. Stable appearance the transplant kidney .Persistent elevated resistive indices. Diffuse abdominal wall edematous changes right lower quadrant without focal collection, cultures from 8/3  negative so far. Skin bx done on 8/8/22 noted findings. ID follow up noted.    Will maintain on antibiotics, Will repeat ultrasound if remains similar.  Will monitor for improvement  I was present during and reviewed clinical and lab data as well as assessment and plan as documented . Please contact if any additional questions with any change in clinical condition or on availability of any additional information or reports.

## 2022-08-21 NOTE — PROVIDER CONTACT NOTE (OTHER) - BACKGROUND
Pt with prolonged hospital stay. Overnight experienced seizures x3 with change in mental status. Currently undergoing EEG.
Pt. admitted for status epilepticus. PMHX s/p DDRT 4/21/2022, seizures, ESRD, CAD, hypothyroidism, HTN.
Pt being treated for RLQ cellulitis
Pt s/p renal transplant with post-op course c/b status epilepticus. Labs reflect leukocytosis, pt intermittently febrile and s/p seizures x3 in last 48 hours.
Pt transferred from  with concern for sepsis
Pt. admitted for acute respiratory failure w. hypoxia. PMHX CVA, seizures.
Pt. admitted for seizures. PMHX ERSD, CAD, Anemia, CVA
Pt. admitted for seizures. PMHX ERSD, CAD, Anemia, CVA
Pt. admitted for status epilepticus. Course of hospitalization, pt. had UTI, seizures, transferred from SICU.  PMHX CVA., DMII, HTN, CAD.
Pt. admitted for status epilepticus. PMHX s/p DDRT 4/21/2022, seizures, ESRD, CAD, hypothyroidism, HTN.
Pt. admitted for status epilepticus. PMHX s/p DDRT 4/21/2022, seizures, ESRD, CAD, hypothyroidism, HTN.
Status Epilepticus
patient admitted s/p seizures
patient admitted s/p seizures
pt admitted sp status epilecticus
patient readmit s/p DDRT w/seizure activity from rehab
patient readmitted for seizures s/p OLT
pt admitted sp status epilecticus
Pt. admitted for status epilepticus. PMHX s/p DDRT 4/21/2022, seizures, ESRD, CAD, hypothyroidism, HTN.
patient readmitted for seizures
patient transferred from [facility] in April, readmitted for seizures from rehab.
Pt. S/P DDRT on 4/22. Admitted on 6/24 with RUE DVT and superficial thrombus in R cephalic and basilic veins
Pt. admitted for seizures. PMHX ERSD, CAD, Anemia, CVA. Pt. tolerating EEG currently, o/n. Pt. s/p seiures from today.
patient readmitted for seizures
pt admitted for status epilepticus, had 1 seizures overnight
Pt. admitted for seizures. PMHX ERSD, CAD, Anemia, CVA
patient readmitted for seizures s/p OLT
Pt admitted 6/10- status epilepticus with prolonged hospital stay. Pt is s/p kidney transplant from 4/2022. Pt has been oliguric and has been undergoing HD frequently.
Pt. admitted for status epilepticus. Course of hospitalization, pt. had UTI, seizures, transferred from SICU.  PMHX CVA., DMII, HTN, CAD.
patient admitted s/p DDRT with seizures from rehab
patient readmitted s/p DDRT for seizures

## 2022-08-21 NOTE — PROGRESS NOTE ADULT - ASSESSMENT
66 yr old Male with PMHx ESRD s/p permacath removal 5/10/22 s/p Rt DDRT 4/21/22,  CVA (2019), Afib on apixaban, seizure activity, CAD s/p stents, CABG (2020), HTN, HLD, DM on insulin, gastric/duodenal ulcer, recent hospitalization 4/28/22 -5/10/22 with weakness/anemia found to have perinephric hematoma requiring evacuation and repair of bleeding arterial anastomosis. Curently being treated for UTI with Levaquin Today after developing multiple sz episodes refractory to 5 mg versed IM (EMS) and 2 mg ativan IV in E.D. concerning for status epilepticus requiring intubation for hypoxic respiratory  failure. MICU Consult was called for hypoxic respiratory failure secondary to status epilepticus.  Nephrology consulted for renal failure.     A/P  DCD KTX on 04/21/22  Course complicated by DGF, was on HD until 4/29/22. Readmitted in May for anemia in the setting of large perinephric hematoma s/p evacuation and repair of arterial anastomosis on 5/02/22. Intra operative biopsy showed no rejection.  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   Now with failed allograft function, Remains dialysis dependent.   continue Immunosuppression per transplant team  last HD 08/19/22, plan for HD Monday  transplant team on board   monitor BMP, U/O     Immunosuppression per transplant team  - Belatacept: 6/23-8/1/2022 discontinued  - on Sirolimus since 8/1/2022 based on level, Cellcept 250 BID, Prednisone 10 mg QD 8/16  - PPX:  Nystatin, bactrim, valcyte (MWF)        HTN   BP manage by transplant team    monitor BP closely

## 2022-08-21 NOTE — PROGRESS NOTE ADULT - NS ATTEND AMEND GEN_ALL_CORE FT
Last seizure a few days ago. meds adjusted as per neurology and started on phenytoin. Cont current mgmt  most anticoagulation interacts with phenytoin- f/u re treating IJ Thrombus and afib  HD planned for tomorrow (MW). Not making urine  abdominal wound/skin improving. Started on keflex yesterday

## 2022-08-21 NOTE — PROGRESS NOTE ADULT - SUBJECTIVE AND OBJECTIVE BOX
--------------------------------------------------------------------------------  Chief Complaint:  No new symptoms    24 hour events/subjective:  Reviewed      PAST HISTORY  --------------------------------------------------------------------------------  No significant changes to PMH, PSH, FHx, SHx, unless otherwise noted    ALLERGIES & MEDICATIONS  --------------------------------------------------------------------------------  Allergies    No Known Allergies    Intolerances      Standing Inpatient Medications  amLODIPine   Tablet 10 milliGRAM(s) Oral daily  brivaracetam 50 milliGRAM(s) Oral two times a day  cephalexin 500 milliGRAM(s) Oral every 12 hours  chlorhexidine 2% Cloths 1 Application(s) Topical <User Schedule>  doxazosin 4 milliGRAM(s) Oral <User Schedule>  epoetin cortney-epbx (RETACRIT) Injectable 05667 Unit(s) IV Push every 7 days  finasteride 5 milliGRAM(s) Oral daily  heparin   Injectable 5000 Unit(s) SubCutaneous every 12 hours  hydrALAZINE 50 milliGRAM(s) Oral three times a day  insulin glargine Injectable (LANTUS) 7 Unit(s) SubCutaneous at bedtime  insulin lispro (ADMELOG) corrective regimen sliding scale   SubCutaneous three times a day before meals  insulin lispro (ADMELOG) corrective regimen sliding scale   SubCutaneous at bedtime  insulin lispro Injectable (ADMELOG) 9 Unit(s) SubCutaneous three times a day before meals  levothyroxine 50 MICROGram(s) Oral daily  melatonin 6 milliGRAM(s) Oral at bedtime  mycophenolate mofetil 250 milliGRAM(s) Oral <User Schedule>  nystatin    Suspension 798178 Unit(s) Oral four times a day  pantoprazole    Tablet 40 milliGRAM(s) Oral <User Schedule>  phenytoin   Capsule 200 milliGRAM(s) Oral two times a day  polyethylene glycol 3350 17 Gram(s) Oral daily  predniSONE   Tablet 10 milliGRAM(s) Oral daily  senna 2 Tablet(s) Oral at bedtime  sirolimus 6 milliGRAM(s) Oral <User Schedule>  trimethoprim   80 mG/sulfamethoxazole 400 mG 1 Tablet(s) Oral daily  valGANciclovir 450 milliGRAM(s) Oral <User Schedule>    PRN Inpatient Medications  acetaminophen     Tablet .. 650 milliGRAM(s) Oral every 6 hours PRN  diphenhydrAMINE 25 milliGRAM(s) Oral at bedtime PRN      REVIEW OF SYSTEMS  --------------------------------------------------------------------------------  No fever/cough/shortness of breath/vomiting  Denies any acute pain  All other systems were reviewed and are negative, except as noted.    VITALS/PHYSICAL EXAM  --------------------------------------------------------------------------------  T(C): 37.1 (08-21-22 @ 08:48), Max: 37.1 (08-20-22 @ 09:00)  HR: 77 (08-21-22 @ 08:48) (75 - 81)  BP: 163/62 (08-21-22 @ 08:48) (130/59 - 185/88)  RR: 18 (08-21-22 @ 08:48) (18 - 18)  SpO2: 96% (08-21-22 @ 08:48) (93% - 97%)      08-20-22 @ 07:01  -  08-21-22 @ 07:00  --------------------------------------------------------  IN: 1030 mL / OUT: 50 mL / NET: 980 mL      Physical Exam:  	Gen: NAD   	HEENT: no acute signs  	Pulm: CTA B/L  	CV:   no rub/gallop  	Abd: Right lower quadrant erythema and induration , no fluctuation   	: No suprapubic tenderness  	UE: no acute signs  	LE: no acute signs  	Neuro: Conscious, alert, communicative      LABS/STUDIES  --------------------------------------------------------------------------------              7.7    8.62  >-----------<  410      [08-21-22 @ 06:44]              24.7     134  |  92  |  74  ----------------------------<  259      [08-21-22 @ 06:44]  4.3   |  24  |  4.65        Ca     9.2     [08-21-22 @ 06:44]      Mg     2.3     [08-21-22 @ 06:44]      Phos  4.0     [08-21-22 @ 06:44]    TPro  7.2  /  Alb  2.9  /  TBili  0.3  /  DBili  0.2  /  AST  22  /  ALT  25  /  AlkPhos  322  [08-21-22 @ 06:44]    PT/INR: PT 14.9 , INR 1.29       [08-21-22 @ 06:44]  PTT: 31.5       [08-21-22 @ 06:44]      Creatinine Trend:  SCr 4.65 [08-21 @ 06:44]  SCr 3.59 [08-20 @ 07:12]  SCr 4.80 [08-19 @ 07:29]  SCr 3.50 [08-18 @ 06:20]  SCr 4.59 [08-17 @ 08:12]      Sirolimus (Rapamycin) Level, Serum: 3.2 ng/mL (08-20 @ 07:16)  Sirolimus (Rapamycin) Level, Serum: 2.8 ng/mL (08-19 @ 07:30)  Sirolimus (Rapamycin) Level, Serum: 2.6 ng/mL (08-18 @ 06:17)  Sirolimus (Rapamycin) Level, Serum: 2.8 ng/mL (08-17 @ 07:59)        Urinalysis - [08-16-22 @ 18:49]      Color Yellow / Appearance Slightly Turbid / SG 1.018 / pH 8.5      Gluc 200 mg/dL / Ketone Negative  / Bili Negative / Urobili Negative       Blood Large / Protein >600 / Leuk Est Moderate / Nitrite Positive      RBC 13 / WBC 40 / Hyaline 7 / Gran  / Sq Epi  / Non Sq Epi 4 / Bacteria Moderate      Iron 17, TIBC 171, %sat 10      [05-02-22 @ 13:03]  Ferritin 6336      [07-27-22 @ 13:00]  PTH -- (Ca 9.2)      [02-05-22 @ 10:13]   294  HbA1c 6.0      [02-21-20 @ 08:53]  TSH 4.69      [07-06-22 @ 18:36]  Lipid: chol --, , HDL --, LDL --      [07-27-22 @ 13:00]    HBsAg Nonreact      [07-25-22 @ 11:51]  HCV 0.18, Nonreact      [07-25-22 @ 11:51]

## 2022-08-21 NOTE — PROGRESS NOTE ADULT - ASSESSMENT
Patient is a 66  M with Type 2 DM> unknown control due to skewed A1C 6.6% secondary to chronic anemia and s/p blood tx. On basal/bolus insulin therapy PTA. DM c/b ESRD s/p DDRT on 3/21, CVA, CAD s/p CABG, Afib on apixaban, seizure activity, HTN, HLD, h/o GI bleed in 2/2022 EGD with duodenal ulcer, discharged to [facility] rehab center after prior hospitalization, now re-admitted from [facility] for seizures c/f status epilepticus in setting of UTI. Endocrine consulted for steroid induced hyperglycemia. Prolonged hospital course w/ ICU stay, previously intubated/extubated, previous seizures. S/p ureteral stent removal 7/12/22.       1.  DM  - T2DM   - Most recent Hemoglobin A1C 6.0 unreliable in the setting of anemia  BG Goal 100-180mg/dl   FS TID AC qHS   CHO Diet   - Recommendations:  - Currently on Prednisone steroid 10 mg daily  - FBG above goal Increase Lantus to 8 units QHS  - Prandial BG w/o pattern for adjustment, c/w Admelog to 9 units sq qhs, hold if NPO or if eating less than 50% of meals; if BG <130 call provider for one time order of admelog 4 units to prevent hypoglycemia   - Continue with low dose correctional scale TID with meals  - Continue with low dose correctional scale QHS    Discharge planning:   - plan to rehab, likely will need basal bolus insulin final doses tbd  Outpatient f/u with Endocrinologist Dr. Lincoln. [address] [facility] [address]. Phone  Needs apt at time of discharge  -Needs optho/renal/cardiac f/u as out pt  -Make sure pt has insulin and DM supplies at time of discharge.    2. HTN  - BP goal 130/80  - Manage per primary team     3. Hypothyroidism   - Continue with LT4 50 mcg daily   - Noted med given with other meds including Protonix which inhibits LT4 absorption.   - Please make sure LT4 is given on an empty stomach at least one hour apart from other meds and food to ensure med absorption. DO NOT GIVE WITH VITAMINS/ANTACIDS  - If unable to ensure proper time administration switching to IV dosing in order to ensure med absorption. Synthroid 37.5mcg IV   Monitor TFTs outpatient. TSH can be skewed when critically ill.    Discussed with patient and primary  team Transplant Isabel HAAS  Contact via [employer] Teams during business hours  On evenings and weekends, please call [phone] or page endocrine fellow on call.   Email: [email or web address]   Please note that this patient may be followed by different provider tomorrow.    greater than 50% of the encounter was spent counseling and/or coordination of care.  26 minutes spent on total encounter; The necessity of the time spent during the encounter on this date of service was due to development of plan of care/coordination of care/glycemic control through review of labs, blood glucose values and vital signs.

## 2022-08-21 NOTE — PROGRESS NOTE ADULT - SUBJECTIVE AND OBJECTIVE BOX
Transplant Surgery - Multidisciplinary Progress Note  --------------------------------------------------------------  DDRT     4/21/2022            Present:  Patient seen with multidisciplinary team including Transplant Surgeon: Dr. Mann,  Nephrologist: Dr. Alfred, [facility], and unit RN during am rounds. Disciplines not in attendance will be notified of the plan.      HPI: 66M with PMH: DM type II, HTN, CAD s/p CABG in 2020, AFib on Eliquis, CVA in 2019 due to Afib, Seizure d/o following CVA, last episode was on 4/8/22, h/o GIB in 2/2022 EGD with duodenal ulcer.  ESRD due to DM was on HD since 2019.     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  Donor was 58 , KDPI 82%, DCD, single vessels and ureter, HLA mismatch 1, 2, 2. No DSA, cPRA 0%. 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 Re-admitted in May for anemia in the setting of large perinephric hematoma s/p evacuation and repair of arterial anastomosis on 5/2/22. Intra operative biopsy showed no rejection, Creatinine ranging ~2mg/dL.    In Rehab:  He was recently dx with klebsiella UTI with Ertapenem - then switched to Levaquin    He also had parainfluenza pneumonia. Was at rehab progressing well.      Hospital course:  - 6/10: transferred from rehab facility for seizure status epilepticus. Intubated for respiratory protection and transferred to MICU.  EEG showed no seizure activity. Neuro consulted.   - 6/11: Extubated. Found to have R pleural effusion. s/p Thoracentesis 1.3L. Eliquis changed to heparin drip.  Post procedure drop in H&H. 5u PRBC, 2 u FFP.   - 6/11 evening: Transferred to SICU from MICU for further care in setting of hypoxia, significant R pleural effusion, anemia and hemodynamic instability  - 6/11 Intubated at 9pm and sedated on Precedex.  CT placed, 600ml blood drained.  1L total overnight, started pressors  - 6/11 Received Protamine for PTT >100 (was on Heparin drip started for AF), 2 u FFP and 5u PRBC total  - 6/13 s/p VATS 2.2L old blood removed; second chest tube placed  - 6/18-19: chest tubes removed  - 6/21: ?PRES vs. chronic ischemic changes on MRI, off Envarsus, switched to Belatacept 6/23 with good graft function  - 6/25: Tx to SICU for refractory seizures, improved with IV antiepileptic regimen  - 7/10: Downgraded from SICU to floor.   - 7/11: OR-->URETERAL STENT REMOVED  - 7/15: ESBL Kleb UTI (50-90K), completed Ertapenem x 3 Days  - 7/25: Tx to SICU for Sepsis/ elevated LFTS  - 7/27: Shiley placed for HD  - 7/28: ongoing fevers -> started Ertapenem/Fluc  - 7/29: HD restarted + 1U PRBC.  IR bx with 2A rejection, started pulse steroids. LFTs have improved  - 8/4: refractory seizures    Interval events:   - no acute overnight events  - AOX3 this am, lethargic  - HTNsive w/ SBP in 170s-180s yesterday  - Last HD 8/19, removed 2L. UOP 50cc over 24 hrs.    - Erythema around wound unchanged since yesterday. Off Vanco since 8/18.  - Started Keflex yesterday for poss wound cellulitis.     Immunosuppression  Maintenance: Sirolimus aper level,,  Cellcept to 250 BID, Pred 10mg po qd   Ongoing monitoring for signs of rejection.    Potential Discharge date: pending clinical improvement.   Education:  Medications  Plan of care:  See Below      MEDICATIONS  (STANDING):  amLODIPine   Tablet 10 milliGRAM(s) Oral daily  brivaracetam 50 milliGRAM(s) Oral two times a day  cephalexin 500 milliGRAM(s) Oral every 12 hours  chlorhexidine 2% Cloths 1 Application(s) Topical <User Schedule>  doxazosin 4 milliGRAM(s) Oral <User Schedule>  epoetin cortney-epbx (RETACRIT) Injectable 69259 Unit(s) IV Push every 7 days  finasteride 5 milliGRAM(s) Oral daily  heparin   Injectable 5000 Unit(s) SubCutaneous every 12 hours  hydrALAZINE 75 milliGRAM(s) Oral three times a day  insulin glargine Injectable (LANTUS) 9 Unit(s) SubCutaneous at bedtime  insulin lispro (ADMELOG) corrective regimen sliding scale   SubCutaneous three times a day before meals  insulin lispro (ADMELOG) corrective regimen sliding scale   SubCutaneous at bedtime  insulin lispro Injectable (ADMELOG) 9 Unit(s) SubCutaneous three times a day before meals  levothyroxine 50 MICROGram(s) Oral daily  melatonin 6 milliGRAM(s) Oral at bedtime  mycophenolate mofetil 250 milliGRAM(s) Oral <User Schedule>  nystatin    Suspension 621071 Unit(s) Oral four times a day  pantoprazole    Tablet 40 milliGRAM(s) Oral <User Schedule>  phenytoin   Capsule 200 milliGRAM(s) Oral two times a day  polyethylene glycol 3350 17 Gram(s) Oral daily  predniSONE   Tablet 10 milliGRAM(s) Oral daily  senna 2 Tablet(s) Oral at bedtime  sirolimus 6 milliGRAM(s) Oral <User Schedule>  trimethoprim   80 mG/sulfamethoxazole 400 mG 1 Tablet(s) Oral daily  valGANciclovir 450 milliGRAM(s) Oral <User Schedule>    MEDICATIONS  (PRN):  acetaminophen     Tablet .. 650 milliGRAM(s) Oral every 6 hours PRN Mild Pain (1 - 3)  diphenhydrAMINE 25 milliGRAM(s) Oral at bedtime PRN Insomnia      PAST MEDICAL & SURGICAL HISTORY:  Hypertension      Diabetes      Dyslipidemia      CAD (Coronary Artery Disease)  with Stents in 06/2009 and 6/2019, s/p off-pump C3L on 8/13/20      Hypothyroidism      CVA (cerebral vascular accident)  12/13/19 with residual bilateral weakness      Anemia      PEG (percutaneous endoscopic gastrostomy) status  removed July 2020      Intubation of airway performed without difficulty  Dec 2019  CVA c/b status epilepticus requiring intubation and PEG in 12/2019-      ESRD on dialysis  M/W/F      Seizures  after CVA, last seizure was last week 4/07/22      History of insertion of stent into coronary artery bypass graft  2009 and 2019      S/P CABG x 3  off pump C3L on 8/13/20          Vital Signs Last 24 Hrs  T(C): 37.1 (21 Aug 2022 08:48), Max: 37.1 (21 Aug 2022 01:00)  T(F): 98.8 (21 Aug 2022 08:48), Max: 98.8 (21 Aug 2022 08:48)  HR: 77 (21 Aug 2022 08:48) (76 - 81)  BP: 163/62 (21 Aug 2022 08:48) (135/71 - 185/88)  BP(mean): 98 (21 Aug 2022 07:36) (94 - 98)  RR: 18 (21 Aug 2022 08:48) (18 - 18)  SpO2: 96% (21 Aug 2022 08:48) (95% - 97%)    Parameters below as of 21 Aug 2022 08:48  Patient On (Oxygen Delivery Method): room air        I&O's Summary    20 Aug 2022 07:01  -  21 Aug 2022 07:00  --------------------------------------------------------  IN: 1030 mL / OUT: 50 mL / NET: 980 mL                              7.7    8.62  )-----------( 410      ( 21 Aug 2022 06:44 )             24.7     08-21    134<L>  |  92<L>  |  74<H>  ----------------------------<  259<H>  4.3   |  24  |  4.65<H>    Ca    9.2      21 Aug 2022 06:44  Phos  4.0     08-21  Mg     2.3     08-21    TPro  7.2  /  Alb  2.9<L>  /  TBili  0.3  /  DBili  0.2  /  AST  22  /  ALT  25  /  AlkPhos  322<H>  08-21          Culture - Blood (collected 08-14-22 @ 15:49)  Source: .Blood Blood-Peripheral  Final Report (08-19-22 @ 21:01):    No Growth Final        REVIEW OF SYSTEMS  --------------------------------------------------------------------------------  unable to obtain full accurate ROS, but denies CP, SOB, HA, dizziness, N/V/D/C, abd discomfort.     PHYSICAL EXAM:  Constitutional: awake, weak  Eyes: Anicteric  ENMT: nc/at  Respiratory: not tachypneic, non-labored on RA    Cardiovascular: normotensive, regular rate on monitor   Gastrointestinal: Soft, non distended, nontender, RLQ incisional/groin ecchymotic but improving.   Genitourinary: anuric on HD   Extremities: SCD's in place and working bilaterally, overall with improving edema  Vascular: Palpable dp pulses bilaterally  Neurological: AAOx3  Skin:  As noted above. No ulcerations   Musculoskeletal: Moving all extremities  Psychiatric: overall calm, following directions

## 2022-08-21 NOTE — PROGRESS NOTE ADULT - ASSESSMENT
66M with h/o DM type II, HTN, CAD s/p CABG in 2020, Afib on Eliquis, CVA in 2019 due to Afib, Seizure d/o (last episode was on 4/8/22), h/o GI bleed in 2/2022 EGD with duodenal ulcer and ESRD on HD s/p R DDRT from DCD donor on 4/21/22 complicated by DGF requiring HD until 4/29/22 who presented with status epilepticus in setting of UTI/antibiotics and sub-therapeutic valproic acid level. Transferred to SICU on 7/25 with signs of sepsis, now with refractory seizures    Seizure Disorder:  - Neuro/Epilepsy Teams following. Avoid Fycompa 2/2 agitation in past  - 1 min seizure with clonic movements noted on EEG 8/16.   - Continue additional anti-seizure medication Briviact 50mg BID with additional 50mg post each HD session  - No further seizures noted.  EEG 8/16-8/18  - prior MRI head 6/27 with less concerns for PRES, likely ischemic changes  - o/n CTH 8/3 with no acute findings  - replete lytes aggressively      R DCD DDRT 4/21/22 c/b 2A ACR  - 2A ACR: s/p pulse steroids. Continue Prednisone 10mg QD  - Deferring right psoas muscle/fascia biopsy as wound is not worsening  - HD/UF daily as per nephrology for fluid overload/anuria, last HD 8/19  - Scrotal support  - 8/14 surveillance BCx negative  - graft doppler stable, stable perinephric collection last US 8/5 and CT scan 8/8  - S/P removal of ureteral stent on 7/12  - Continue Doxazosin/Proscar for BPH  - OOB with RN/PT  - Renal Soft diet as tolerated with PO supplements  - 8/8 Punch biopsy wound Culture: ngtd. Negative for PTLD  - Continue Keflex for cellulitis (erythema around abd wound).   - DVT ppx with SQH  - fungitel negative  - LE studies WNL  - Benadryl PRN for sleep  - Daily CRP    Immunosuppression:   - Belatacept: 6/23-8/1 discontinued  - on Sirolimus based on level, Cellcept 250 BID, Pred 20mg QD  - PPX:  Nystatin, bactrim, valcyte (MWF)    DM  - Increase Lantus to 9U from 7U. Continue pre-meal 9U and ISS.   - Fingerstick ac and qhs  - Continue Lantus 7u qpm and premeal Humalog with SSI  - Endo following    AFib/R IJ DVT   - Eliquis with ~40% less efficacy while on fosphenytoin.    - Lovenox held for ANA and transaminitis   - rate controlled  - will discuss A/C plans daily    HTN:  - Amlodipine/Doxazosin/Hydralazline  - off Coreg due to bradycardia

## 2022-08-22 NOTE — PROGRESS NOTE ADULT - PROBLEM SELECTOR PLAN 1
-test BG AC/HS  -c/w Lantus 8 units QHS  -c/w Admelog 9 units AC meals (hold if not eating)  -c/w Admelog low correction scale AC and low HS scale  -cons carb diet  -notify endocrine team if steroids changed as insulin regimen will need adjustment  Discharge planning:   - plan to rehab, likely will need basal bolus insulin final doses tbd  Outpatient f/u with Endocrinologist Dr. Lincoln. [address] [facility] [address]. Phone  Needs apt at time of discharge  -Needs optho/renal/cardiac f/u as out pt  -Make sure pt has insulin and DM supplies at time of discharge.

## 2022-08-22 NOTE — PROGRESS NOTE ADULT - ASSESSMENT
66 year old male with PMH of  DM type II, HTN, CAD s/p CABG in 2020, Afib on Eliquis, CVA in 2019 due to Afib, Seizure d/o (last episode was on 4/8/22), h/o GI bleed in 2/2022 EGD with duodenal ulcer and ESRD on HD s/p R DDRT from DCD donor on 4/21/22 complicated by DGF requiring HD until 4/29/22, later had good graft function who was readmitted with status epilepticus in setting of UTI/antibiotics and sub-therapeutic valproic acid level.  Transferred to SICU on 7/25 with signs of sepsis. 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 Received pulse steroids (Solumedrol 500 on 7/30 -> 250 on 7/31). Currently HD dependent

## 2022-08-22 NOTE — PROGRESS NOTE ADULT - NUTRITIONAL ASSESSMENT
This patient has been assessed with a concern for Malnutrition and has been determined to have a diagnosis/diagnoses of Severe protein-calorie malnutrition.    This patient is being managed with:   Diet Consistent Carbohydrate w/Evening Snack-  1000mL Fluid Restriction ([id number])  Supplement Feeding Modality:  Oral  Nepro Cans or Servings Per Day:  3       Frequency:  Daily  Entered: Aug 19 2022  5:52PM

## 2022-08-22 NOTE — PROGRESS NOTE ADULT - NSPROGADDITIONALINFOA_GEN_ALL_CORE
25 minutes spent on the development of plan of care/coordination of care/glycemic control through review of labs, blood glucose values and vital signs.

## 2022-08-22 NOTE — PROGRESS NOTE ADULT - NUTRITIONAL ASSESSMENT
Diet, Consistent Carbohydrate w/Evening Snack:   1000mL Fluid Restriction ([id number])  Supplement Feeding Modality:  Oral  Nepro Cans or Servings Per Day:  3       Frequency:  Daily (08-19-22 @ 17:52) [Active]

## 2022-08-22 NOTE — PROGRESS NOTE ADULT - ASSESSMENT
Patient is a 66  M with Type 2 DM> unknown control due to skewed A1C 6.6% secondary to chronic anemia and s/p blood tx. On basal/bolus insulin therapy PTA. DM c/b ESRD s/p DDRT on 3/21, CVA, CAD s/p CABG, Afib on apixaban, seizure activity, HTN, HLD, h/o GI bleed in 2/2022 EGD with duodenal ulcer, discharged to [facility] rehab center after prior hospitalization, now re-admitted from [facility] for seizures c/f status epilepticus in setting of UTI. Endocrine consulted for steroid induced hyperglycemia. Prolonged hospital course w/ ICU stay, previously intubated/extubated, previous seizures. S/p ureteral stent removal 7/12/22. BG values improved on adjusted insulin regimen. BG goal (100-180mg/dl).

## 2022-08-22 NOTE — PROGRESS NOTE ADULT - ASSESSMENT
66M with h/o DM type II, HTN, CAD s/p CABG in 2020, Afib on Eliquis, CVA in 2019 due to Afib, Seizure d/o (last episode was on 4/8/22), h/o GI bleed in 2/2022 EGD with duodenal ulcer and ESRD on HD s/p R DDRT from DCD donor on 4/21/22 complicated by DGF requiring HD until 4/29/22 who presented with status epilepticus in setting of UTI/antibiotics and sub-therapeutic valproic acid level. Transferred to SICU on 7/25 with signs of sepsis, now with refractory seizures    Seizure Disorder:  - Neuro/Epilepsy Teams following. Avoid Fycompa 2/2 agitation in past  - 1 min seizure with clonic movements noted on EEG 8/16.   - Continue additional anti-seizure medication Briviact 50mg BID with additional 50mg post each HD session  - No further seizures noted.  EEG 8/16-8/18  - prior MRI head 6/27 with less concerns for PRES, likely ischemic changes  - o/n CTH 8/3 with no acute findings  - replete lytes aggressively      R DCD DDRT 4/21/22 c/b 2A ACR  - 2A ACR: s/p pulse steroids. Continue Prednisone 10mg QD  - Deferring right psoas muscle/fascia biopsy as wound is not worsening  - HD/UF as per nephrology for fluid overload/anuria, HD today with 1unit PRBC for H&H 7.2/23.3  - 8/14 surveillance BCx negative  - graft doppler stable, stable perinephric collection last US 8/5 and CT scan 8/8  - S/P removal of ureteral stent on 7/12  - Continue Doxazosin/Proscar for BPH  - OOB with RN/PT  - Renal Soft diet as tolerated with PO supplements  - 8/8 Punch biopsy wound Culture: ngtd. Negative for PTLD  - Continue Keflex for cellulitis (erythema around abd wound).   - DVT ppx with SQH  - fungitel negative  - LE studies WNL  - Benadryl PRN for sleep  - Daily CRP  - Repeat renal US today    Immunosuppression:   - Belatacept: 6/23-8/1 discontinued  - on Sirolimus based on level, Cellcept 250 BID, Pred 20mg QD  - PPX:  Nystatin, bactrim, valcyte (MWF)    DM  - BG improved on Lantus 8U. Continue pre-meal 9U and ISS.   - Fingerstick ac and qhs  - Endo following    AFib/R IJ DVT   - Eliquis with ~40% less efficacy while on fosphenytoin.    - Lovenox held for ANA and transaminitis   - rate controlled  - will discuss A/C plans daily    HTN:  - Amlodipine/Doxazosin/Hydralazline  - off Coreg due to bradycardia

## 2022-08-22 NOTE — PROGRESS NOTE ADULT - PROBLEM SELECTOR PLAN 3
- Continue with LT4 50 mcg daily   - Noted med given with other meds including Protonix which inhibits LT4 absorption.   - Please make sure LT4 is given on an empty stomach at least one hour apart from other meds and food to ensure med absorption. DO NOT GIVE WITH VITAMINS/ANTACIDS  - If unable to ensure proper time administration switching to IV dosing in order to ensure med absorption. Synthroid 37.5mcg IV   Monitor TFTs outpatient. TSH can be skewed when critically ill.    discussed w/pt's son  Can be reached via TEAMS/pager: [phone]   office:  [phone] (M-F 9a-5pm)               [phone] (nights/weekends)   [email or web address] password [email or web address]

## 2022-08-22 NOTE — CONSULT NOTE ADULT - SUBJECTIVE AND OBJECTIVE BOX
Patient is a 66y old  Male who presents with a chief complaint of Status Epilepticus (22 Aug 2022 15:26)      HPI:  66 yr old Male with PMHx ESRD s/p permacath removal 5/10/22 s/p Rt DDRT 4/21/22,  CVA (2019), Afib on apixaban, seizure activity, CAD s/p stents, CABG (2020), HTN, HLD, DM on insulin, gastric/duodenal ulcer, recent hospitalization 4/28/22 -5/10/22 with weakness/anemia found to have perinephric hematoma requiring evacuation and repair of bleeding arterial anastomosis. Curently being treated for UTI with Levaquin Today after developing multiple sz episodes refractory to 5 mg versed IM (EMS) and 2 mg ativan IV in E.D. concerning for status epilepticus requiring intubation for hypoxic respiratory  failure. MICU Consult was called for hypoxic respiratory failure secondary to status epilepticus.     In the ED VS temp 97.8 Axillary, Hrt rate 61 RR26 100% on NRB mask   Per ED noted the patient was noted to be gurgling and was unable to protect airway, and was intubated. The patient has now been accepted to the MICU for further management   (10 Raffi 2022 19:11)    Podiatry consulted for painful elongated toenails.    PAST MEDICAL & SURGICAL HISTORY:  Hypertension      Diabetes      Dyslipidemia      CAD (Coronary Artery Disease)  with Stents in 06/2009 and 6/2019, s/p off-pump C3L on 8/13/20      Hypothyroidism      CVA (cerebral vascular accident)  12/13/19 with residual bilateral weakness      Anemia      PEG (percutaneous endoscopic gastrostomy) status  removed July 2020      Intubation of airway performed without difficulty  Dec 2019  CVA c/b status epilepticus requiring intubation and PEG in 12/2019-      ESRD on dialysis  M/W/F      Seizures  after CVA, last seizure was last week 4/07/22      History of insertion of stent into coronary artery bypass graft  2009 and 2019      S/P CABG x 3  off pump C3L on 8/13/20          MEDICATIONS  (STANDING):  amLODIPine   Tablet 10 milliGRAM(s) Oral daily  brivaracetam 50 milliGRAM(s) Oral two times a day  cephalexin 500 milliGRAM(s) Oral every 12 hours  chlorhexidine 2% Cloths 1 Application(s) Topical <User Schedule>  doxazosin 4 milliGRAM(s) Oral <User Schedule>  epoetin cortney-epbx (RETACRIT) Injectable 81835 Unit(s) SubCutaneous <User Schedule>  finasteride 5 milliGRAM(s) Oral daily  heparin   Injectable 5000 Unit(s) SubCutaneous every 12 hours  hydrALAZINE 75 milliGRAM(s) Oral three times a day  insulin glargine Injectable (LANTUS) 8 Unit(s) SubCutaneous at bedtime  insulin lispro (ADMELOG) corrective regimen sliding scale   SubCutaneous three times a day before meals  insulin lispro (ADMELOG) corrective regimen sliding scale   SubCutaneous at bedtime  insulin lispro Injectable (ADMELOG) 9 Unit(s) SubCutaneous three times a day before meals  levothyroxine 50 MICROGram(s) Oral daily  melatonin 6 milliGRAM(s) Oral at bedtime  mycophenolate mofetil 250 milliGRAM(s) Oral <User Schedule>  nystatin    Suspension 289793 Unit(s) Oral four times a day  pantoprazole    Tablet 40 milliGRAM(s) Oral <User Schedule>  phenytoin   Capsule 200 milliGRAM(s) Oral two times a day  polyethylene glycol 3350 17 Gram(s) Oral daily  predniSONE   Tablet 10 milliGRAM(s) Oral daily  senna 2 Tablet(s) Oral at bedtime  sirolimus 6 milliGRAM(s) Oral <User Schedule>  trimethoprim   80 mG/sulfamethoxazole 400 mG 1 Tablet(s) Oral daily  valGANciclovir 450 milliGRAM(s) Oral <User Schedule>    MEDICATIONS  (PRN):  acetaminophen     Tablet .. 650 milliGRAM(s) Oral every 6 hours PRN Mild Pain (1 - 3)  diphenhydrAMINE 25 milliGRAM(s) Oral at bedtime PRN Insomnia      Allergies    No Known Allergies    Intolerances        VITALS:    Vital Signs Last 24 Hrs  T(C): 36.7 (22 Aug 2022 21:16), Max: 36.8 (22 Aug 2022 17:10)  T(F): 98.1 (22 Aug 2022 21:16), Max: 98.2 (22 Aug 2022 17:10)  HR: 84 (22 Aug 2022 21:16) (70 - 84)  BP: 161/70 (22 Aug 2022 21:16) (134/60 - 184/77)  BP(mean): 111 (22 Aug 2022 05:35) (95 - 111)  RR: 18 (22 Aug 2022 21:16) (18 - 20)  SpO2: 94% (22 Aug 2022 21:16) (94% - 99%)    Parameters below as of 22 Aug 2022 21:16  Patient On (Oxygen Delivery Method): room air        LABS:                          7.2    9.09  )-----------( 447      ( 22 Aug 2022 06:54 )             23.3       08-22    135  |  92<L>  |  98<H>  ----------------------------<  143<H>  4.3   |  23  |  5.79<H>    Ca    9.2      22 Aug 2022 06:56  Phos  4.4     08-22  Mg     2.2     08-22    TPro  6.9  /  Alb  2.7<L>  /  TBili  0.3  /  DBili  0.2  /  AST  24  /  ALT  21  /  AlkPhos  351<H>  08-22      CAPILLARY BLOOD GLUCOSE      POCT Blood Glucose.: 140 mg/dL (22 Aug 2022 21:18)  POCT Blood Glucose.: 186 mg/dL (22 Aug 2022 17:41)  POCT Blood Glucose.: 163 mg/dL (22 Aug 2022 12:38)  POCT Blood Glucose.: 161 mg/dL (22 Aug 2022 07:51)      PT/INR - ( 21 Aug 2022 06:44 )   PT: 14.9 sec;   INR: 1.29 ratio         PTT - ( 22 Aug 2022 06:54 )  PTT:30.7 sec    LOWER EXTREMITY PHYSICAL EXAM:    Vasular: DP/PT 2/4, B/L, CFT <3 seconds B/L, Temperature gradient WNL, B/L.   Neuro: Epicritic sensation intact to the level of foot, B/L.  Musculoskeletal/Ortho: hammertoes 2-5 bilat, pain with palp of toes bilat  Skin: toenails thickened elongated dystrophic with subungual debris x10 with mildly ingrown hallucal toenails bilat

## 2022-08-22 NOTE — PROGRESS NOTE ADULT - ASSESSMENT
66 yr old Male with PMHx ESRD s/p permacath removal 5/10/22 s/p Rt DDRT 4/21/22,  CVA (2019), Afib on apixaban, seizure activity, CAD s/p stents, CABG (2020), HTN, HLD, DM on insulin, gastric/duodenal ulcer, recent hospitalization 4/28/22 -5/10/22 with weakness/anemia found to have perinephric hematoma requiring evacuation and repair of bleeding arterial anastomosis. Curently being treated for UTI with Levaquin Today after developing multiple sz episodes refractory to 5 mg versed IM (EMS) and 2 mg ativan IV in E.D. concerning for status epilepticus requiring intubation for hypoxic respiratory  failure. MICU Consult was called for hypoxic respiratory failure secondary to status epilepticus.  Nephrology consulted for renal failure.     A/P  DCD KTX on 04/21/22  Course complicated by DGF, was on HD until 4/29/22. Readmitted in May for anemia in the setting of large perinephric hematoma s/p evacuation and repair of arterial anastomosis on 5/02/22. Intra operative biopsy showed no rejection.  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   Now with failed allograft function, Remains dialysis dependent.   continue Immunosuppression per transplant team  last HD 08/19/22, plan for HD today  transplant team on board   monitor BMP, U/O     Immunosuppression per transplant team  - Belatacept: 6/23-8/1/2022 discontinued  - on Sirolimus since 8/1/2022 based on level, Cellcept 250 BID, Prednisone 10 mg QD 8/16  - PPX:  Nystatin, bactrim, valcyte (MWF)        HTN   BP manage by transplant team    monitor BP closely

## 2022-08-22 NOTE — PROGRESS NOTE ADULT - SUBJECTIVE AND OBJECTIVE BOX
[facility]   MD NINA MASON MD, PA KRISTINE SOLTANPOUR, DO INJUNG KO, NP    TEL:  OFFICE: [phone]    From 5pm-7am Answering Service [phone]    -- RENAL FOLLOW UP NOTE ---Date of Service 08-22-22 @ 14:36    Patient is a 66y old  Male who presents with a chief complaint of Status Epilepticus (22 Aug 2022 10:34)      Patient seen and examined at bedside. No chest pain/sob    VITALS:  T(F): 97.6 (08-22-22 @ 14:10), Max: 98.2 (08-21-22 @ 21:00)  HR: 70 (08-22-22 @ 14:10)  BP: 134/60 (08-22-22 @ 14:10)  RR: 20 (08-22-22 @ 14:10)  SpO2: 99% (08-22-22 @ 14:10)  Wt(kg): --    08-21 @ 07:01  -  08-22 @ 07:00  --------------------------------------------------------  IN: 870 mL / OUT: 0 mL / NET: 870 mL    08-22 @ 07:01  -  08-22 @ 14:36  --------------------------------------------------------  IN: 240 mL / OUT: 0 mL / NET: 240 mL          PHYSICAL EXAM:  Constitutional: NAD  Neck: No JVD  Respiratory: CTAB, no wheezes, rales or rhonchi  Cardiovascular: S1, S2, RRR  Gastrointestinal: BS+, soft, NT/ND  Extremities: No peripheral edema    Hospital Medications:   MEDICATIONS  (STANDING):  amLODIPine   Tablet 10 milliGRAM(s) Oral daily  brivaracetam 50 milliGRAM(s) Oral two times a day  cephalexin 500 milliGRAM(s) Oral every 12 hours  chlorhexidine 2% Cloths 1 Application(s) Topical <User Schedule>  doxazosin 4 milliGRAM(s) Oral <User Schedule>  epoetin cortney-epbx (RETACRIT) Injectable 04576 Unit(s) SubCutaneous <User Schedule>  finasteride 5 milliGRAM(s) Oral daily  heparin   Injectable 5000 Unit(s) SubCutaneous every 12 hours  hydrALAZINE 75 milliGRAM(s) Oral three times a day  insulin glargine Injectable (LANTUS) 8 Unit(s) SubCutaneous at bedtime  insulin lispro (ADMELOG) corrective regimen sliding scale   SubCutaneous three times a day before meals  insulin lispro (ADMELOG) corrective regimen sliding scale   SubCutaneous at bedtime  insulin lispro Injectable (ADMELOG) 9 Unit(s) SubCutaneous three times a day before meals  levothyroxine 50 MICROGram(s) Oral daily  melatonin 6 milliGRAM(s) Oral at bedtime  mycophenolate mofetil 250 milliGRAM(s) Oral <User Schedule>  nystatin    Suspension 565847 Unit(s) Oral four times a day  pantoprazole    Tablet 40 milliGRAM(s) Oral <User Schedule>  phenytoin   Capsule 200 milliGRAM(s) Oral two times a day  polyethylene glycol 3350 17 Gram(s) Oral daily  predniSONE   Tablet 10 milliGRAM(s) Oral daily  senna 2 Tablet(s) Oral at bedtime  sirolimus 6 milliGRAM(s) Oral <User Schedule>  trimethoprim   80 mG/sulfamethoxazole 400 mG 1 Tablet(s) Oral daily  valGANciclovir 450 milliGRAM(s) Oral <User Schedule>      LABS:  08-22    135  |  92<L>  |  98<H>  ----------------------------<  143<H>  4.3   |  23  |  5.79<H>    Ca    9.2      22 Aug 2022 06:56  Phos  4.4     08-22  Mg     2.2     08-22    TPro  6.9  /  Alb  2.7<L>  /  TBili  0.3  /  DBili  0.2  /  AST  24  /  ALT  21  /  AlkPhos  351<H>  08-22    Creatinine Trend: 5.79 <--, 4.65 <--, 3.59 <--, 4.80 <--, 3.50 <--, 4.59 <--, 3.59 <--    Albumin, Serum: 2.7 g/dL (08-22 @ 06:56)  Phosphorus Level, Serum: 4.4 mg/dL (08-22 @ 06:56)                              7.2    9.09  )-----------( 447      ( 22 Aug 2022 06:54 )             23.3     Urine Studies:  Urinalysis - [08-16-22 @ 18:49]      Color Yellow / Appearance Slightly Turbid / SG 1.018 / pH 8.5      Gluc 200 mg/dL / Ketone Negative  / Bili Negative / Urobili Negative       Blood Large / Protein >600 / Leuk Est Moderate / Nitrite Positive      RBC 13 / WBC 40 / Hyaline 7 / Gran  / Sq Epi  / Non Sq Epi 4 / Bacteria Moderate      Iron 17, TIBC 171, %sat 10      [05-02-22 @ 13:03]  Ferritin 6336      [07-27-22 @ 13:00]  PTH -- (Ca 9.2)      [02-05-22 @ 10:13]   294  HbA1c 6.0      [02-21-20 @ 08:53]  TSH 4.69      [07-06-22 @ 18:36]  Lipid: chol --, , HDL --, LDL --      [07-27-22 @ 13:00]    HBsAg Nonreact      [07-25-22 @ 11:51]  HCV 0.18, Nonreact      [07-25-22 @ 11:51]      RADIOLOGY & ADDITIONAL STUDIES:

## 2022-08-22 NOTE — PROGRESS NOTE ADULT - NS ATTEND AMEND GEN_ALL_CORE FT
erythema and induration around incision seems stable  will monitor closely  remains oliguric on dialysis  immunosuppression sirolimus and steroids; received jitendra

## 2022-08-22 NOTE — PROGRESS NOTE ADULT - SUBJECTIVE AND OBJECTIVE BOX
[facility] DIVISION OF KIDNEY DISEASES AND HYPERTENSION -- FOLLOW UP NOTE  --------------------------------------------------------------------------------  Chief Complaint:    24 hour events/subjective:        PAST HISTORY  --------------------------------------------------------------------------------  No significant changes to PMH, PSH, FHx, SHx, unless otherwise noted    ALLERGIES & MEDICATIONS  --------------------------------------------------------------------------------  Allergies    No Known Allergies    Intolerances      Standing Inpatient Medications  amLODIPine   Tablet 10 milliGRAM(s) Oral daily  brivaracetam 50 milliGRAM(s) Oral two times a day  cephalexin 500 milliGRAM(s) Oral every 12 hours  chlorhexidine 2% Cloths 1 Application(s) Topical <User Schedule>  doxazosin 4 milliGRAM(s) Oral <User Schedule>  epoetin cortney-epbx (RETACRIT) Injectable 53381 Unit(s) SubCutaneous <User Schedule>  finasteride 5 milliGRAM(s) Oral daily  heparin   Injectable 5000 Unit(s) SubCutaneous every 12 hours  hydrALAZINE 75 milliGRAM(s) Oral three times a day  insulin glargine Injectable (LANTUS) 8 Unit(s) SubCutaneous at bedtime  insulin lispro (ADMELOG) corrective regimen sliding scale   SubCutaneous three times a day before meals  insulin lispro (ADMELOG) corrective regimen sliding scale   SubCutaneous at bedtime  insulin lispro Injectable (ADMELOG) 9 Unit(s) SubCutaneous three times a day before meals  levothyroxine 50 MICROGram(s) Oral daily  melatonin 6 milliGRAM(s) Oral at bedtime  mycophenolate mofetil 250 milliGRAM(s) Oral <User Schedule>  nystatin    Suspension 981198 Unit(s) Oral four times a day  pantoprazole    Tablet 40 milliGRAM(s) Oral <User Schedule>  phenytoin   Capsule 200 milliGRAM(s) Oral two times a day  polyethylene glycol 3350 17 Gram(s) Oral daily  predniSONE   Tablet 10 milliGRAM(s) Oral daily  senna 2 Tablet(s) Oral at bedtime  sirolimus 6 milliGRAM(s) Oral <User Schedule>  trimethoprim   80 mG/sulfamethoxazole 400 mG 1 Tablet(s) Oral daily  valGANciclovir 450 milliGRAM(s) Oral <User Schedule>    PRN Inpatient Medications  acetaminophen     Tablet .. 650 milliGRAM(s) Oral every 6 hours PRN  diphenhydrAMINE 25 milliGRAM(s) Oral at bedtime PRN      REVIEW OF SYSTEMS  --------------------------------------------------------------------------------  Gen: No fevers/chills  Respiratory: No dyspnea, cough,   CV: No chest pain, PND, orthopnea  GI: No abdominal pain, diarrhea, constipation, nausea, vomiting  Transplant: No pain  : No increased frequency, dysuria, hematuria   MSK: No edema  Neuro: No dizziness/lightheadedness    All other systems were reviewed and are negative, except as noted.    VITALS/PHYSICAL EXAM  --------------------------------------------------------------------------------  T(C): 36.7 (08-22-22 @ 08:41), Max: 37 (08-21-22 @ 12:45)  HR: 74 (08-22-22 @ 08:41) (73 - 77)  BP: 142/65 (08-22-22 @ 08:41) (142/65 - 184/77)  RR: 20 (08-22-22 @ 08:41) (17 - 20)  SpO2: 95% (08-22-22 @ 08:41) (95% - 98%)  Wt(kg): --        08-21-22 @ 07:01  -  08-22-22 @ 07:00  --------------------------------------------------------  IN: 870 mL / OUT: 0 mL / NET: 870 mL      Physical Exam:  	Gen: NAD, able to speak in full sentences   	HEENT: PERRL, MMM   	Pulm: CTA B/L, no crackles   	CV: RRR, S1S2+  	Abd: +BS, soft          Transplant: No tenderness, swelling  	: No suprapubic tenderness  	MSK: no edema   	Psych: Normal affect and mood  	Skin: Warm          Access:    LABS/STUDIES  --------------------------------------------------------------------------------              7.2    9.09  >-----------<  447      [08-22-22 @ 06:54]              23.3     135  |  92  |  98  ----------------------------<  143      [08-22-22 @ 06:56]  4.3   |  23  |  5.79        Ca     9.2     [08-22-22 @ 06:56]      Mg     2.2     [08-22-22 @ 06:56]      Phos  4.4     [08-22-22 @ 06:56]    TPro  6.9  /  Alb  2.7  /  TBili  0.3  /  DBili  0.2  /  AST  24  /  ALT  21  /  AlkPhos  351  [08-22-22 @ 06:56]    PT/INR: PT 14.9 , INR 1.29       [08-21-22 @ 06:44]  PTT: 30.7       [08-22-22 @ 06:54]      Creatinine Trend:  SCr 5.79 [08-22 @ 06:56]  SCr 4.65 [08-21 @ 06:44]  SCr 3.59 [08-20 @ 07:12]  SCr 4.80 [08-19 @ 07:29]  SCr 3.50 [08-18 @ 06:20]        Sirolimus (Rapamycin) Level, Serum: 4.1 ng/mL (08-21 @ 06:44)  Sirolimus (Rapamycin) Level, Serum: 3.2 ng/mL (08-20 @ 07:16)  Sirolimus (Rapamycin) Level, Serum: 2.8 ng/mL (08-19 @ 07:30)  Sirolimus (Rapamycin) Level, Serum: 2.6 ng/mL (08-18 @ 06:17)        Urinalysis - [08-16-22 @ 18:49]      Color Yellow / Appearance Slightly Turbid / SG 1.018 / pH 8.5      Gluc 200 mg/dL / Ketone Negative  / Bili Negative / Urobili Negative       Blood Large / Protein >600 / Leuk Est Moderate / Nitrite Positive      RBC 13 / WBC 40 / Hyaline 7 / Gran  / Sq Epi  / Non Sq Epi 4 / Bacteria Moderate      Iron 17, TIBC 171, %sat 10      [05-02-22 @ 13:03]  Ferritin 6336      [07-27-22 @ 13:00]  PTH -- (Ca 9.2)      [02-05-22 @ 10:13]   294  HbA1c 6.0      [02-21-20 @ 08:53]  TSH 4.69      [07-06-22 @ 18:36]  Lipid: chol --, , HDL --, LDL --      [07-27-22 @ 13:00]    HBsAg Nonreact      [07-25-22 @ 11:51]  HCV 0.18, Nonreact      [07-25-22 @ 11:51]       [facility] DIVISION OF KIDNEY DISEASES AND HYPERTENSION -- FOLLOW UP NOTE  --------------------------------------------------------------------------------  Chief Complaint: 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 was seen and examined today. Overnight events noted.   Pt reports RUQ pain - intermittent. Denies N/V, fever, chills, diarrhea or constipation or dizziness. Last Hd done on 8/19/22.    PAST HISTORY  --------------------------------------------------------------------------------  No significant changes to PMH, PSH, FHx, SHx, unless otherwise noted    ALLERGIES & MEDICATIONS  --------------------------------------------------------------------------------  Allergies    No Known Allergies    Intolerances    Standing Inpatient Medications  amLODIPine   Tablet 10 milliGRAM(s) Oral daily  brivaracetam 50 milliGRAM(s) Oral two times a day  cephalexin 500 milliGRAM(s) Oral every 12 hours  chlorhexidine 2% Cloths 1 Application(s) Topical <User Schedule>  doxazosin 4 milliGRAM(s) Oral <User Schedule>  epoetin cortney-epbx (RETACRIT) Injectable 81960 Unit(s) SubCutaneous <User Schedule>  finasteride 5 milliGRAM(s) Oral daily  heparin   Injectable 5000 Unit(s) SubCutaneous every 12 hours  hydrALAZINE 75 milliGRAM(s) Oral three times a day  insulin glargine Injectable (LANTUS) 8 Unit(s) SubCutaneous at bedtime  insulin lispro (ADMELOG) corrective regimen sliding scale   SubCutaneous three times a day before meals  insulin lispro (ADMELOG) corrective regimen sliding scale   SubCutaneous at bedtime  insulin lispro Injectable (ADMELOG) 9 Unit(s) SubCutaneous three times a day before meals  levothyroxine 50 MICROGram(s) Oral daily  melatonin 6 milliGRAM(s) Oral at bedtime  mycophenolate mofetil 250 milliGRAM(s) Oral <User Schedule>  nystatin    Suspension 416031 Unit(s) Oral four times a day  pantoprazole    Tablet 40 milliGRAM(s) Oral <User Schedule>  phenytoin   Capsule 200 milliGRAM(s) Oral two times a day  polyethylene glycol 3350 17 Gram(s) Oral daily  predniSONE   Tablet 10 milliGRAM(s) Oral daily  senna 2 Tablet(s) Oral at bedtime  sirolimus 6 milliGRAM(s) Oral <User Schedule>  trimethoprim   80 mG/sulfamethoxazole 400 mG 1 Tablet(s) Oral daily  valGANciclovir 450 milliGRAM(s) Oral <User Schedule>    PRN Inpatient Medications  acetaminophen     Tablet .. 650 milliGRAM(s) Oral every 6 hours PRN  diphenhydrAMINE 25 milliGRAM(s) Oral at bedtime PRN      REVIEW OF SYSTEMS  --------------------------------------------------------------------------------  Gen: No fevers/chills  Respiratory: No dyspnea, cough,   CV: No chest pain,   GI: No abdominal pain,  Transplant: No pain  : No increased frequency,  MSK: No edema  Neuro: No dizziness/lightheadedness    All other systems were reviewed and are negative, except as noted.    VITALS/PHYSICAL EXAM  --------------------------------------------------------------------------------  T(C): 36.7 (08-22-22 @ 08:41), Max: 37 (08-21-22 @ 12:45)  HR: 74 (08-22-22 @ 08:41) (73 - 77)  BP: 142/65 (08-22-22 @ 08:41) (142/65 - 184/77)  RR: 20 (08-22-22 @ 08:41) (17 - 20)  SpO2: 95% (08-22-22 @ 08:41) (95% - 98%)  Wt(kg): --    08-21-22 @ 07:01  -  08-22-22 @ 07:00  --------------------------------------------------------  IN: 870 mL / OUT: 0 mL / NET: 870 mL    Physical Exam:  	Gen: NAD, able to speak in full sentences   	HEENT: PERRL, MMM   	Pulm: CTA B/L, no crackles   	CV: RRR, S1S2+  	Abd: +BS, soft              Transplant: No tenderness, swelling  	: No suprapubic tenderness  	MSK: no edema   	Psych: Normal affect and mood  	Skin: Warm              Access: Left IJ tunneled HD catheter +    LABS/STUDIES  --------------------------------------------------------------------------------              7.2    9.09  >-----------<  447      [08-22-22 @ 06:54]              23.3     135  |  92  |  98  ----------------------------<  143      [08-22-22 @ 06:56]  4.3   |  23  |  5.79        Ca     9.2     [08-22-22 @ 06:56]      Mg     2.2     [08-22-22 @ 06:56]      Phos  4.4     [08-22-22 @ 06:56]    TPro  6.9  /  Alb  2.7  /  TBili  0.3  /  DBili  0.2  /  AST  24  /  ALT  21  /  AlkPhos  351  [08-22-22 @ 06:56]    PT/INR: PT 14.9 , INR 1.29       [08-21-22 @ 06:44]  PTT: 30.7       [08-22-22 @ 06:54]    Creatinine Trend:  SCr 5.79 [08-22 @ 06:56]  SCr 4.65 [08-21 @ 06:44]  SCr 3.59 [08-20 @ 07:12]  SCr 4.80 [08-19 @ 07:29]  SCr 3.50 [08-18 @ 06:20]    Sirolimus (Rapamycin) Level, Serum: 4.1 ng/mL (08-21 @ 06:44)  Sirolimus (Rapamycin) Level, Serum: 3.2 ng/mL (08-20 @ 07:16)  Sirolimus (Rapamycin) Level, Serum: 2.8 ng/mL (08-19 @ 07:30)  Sirolimus (Rapamycin) Level, Serum: 2.6 ng/mL (08-18 @ 06:17)    Urinalysis - [08-16-22 @ 18:49]      Color Yellow / Appearance Slightly Turbid / SG 1.018 / pH 8.5      Gluc 200 mg/dL / Ketone Negative  / Bili Negative / Urobili Negative       Blood Large / Protein >600 / Leuk Est Moderate / Nitrite Positive      RBC 13 / WBC 40 / Hyaline 7 / Gran  / Sq Epi  / Non Sq Epi 4 / Bacteria Moderate    Iron 17, TIBC 171, %sat 10      [05-02-22 @ 13:03]  Ferritin 6336      [07-27-22 @ 13:00]  PTH -- (Ca 9.2)      [02-05-22 @ 10:13]   294  HbA1c 6.0      [02-21-20 @ 08:53]  TSH 4.69      [07-06-22 @ 18:36]  Lipid: chol --, , HDL --, LDL --      [07-27-22 @ 13:00]    HBsAg Nonreact      [07-25-22 @ 11:51]  HCV 0.18, Nonreact      [07-25-22 @ 11:51]

## 2022-08-22 NOTE — PROGRESS NOTE ADULT - SUBJECTIVE AND OBJECTIVE BOX
Diabetes Follow up note:    Chief complaint: T2DM w/steroid induced hyperglycemia    Interval Hx: BG values 140-low 200s over past 24 hours. Pt seen at bedside prior to HD session today. Pt and son endorsing pt w/good appetite, ate most of his sandwich today.     Review of Systems:  General: denies pain  GI: Tolerating POs. Denies N/V/D/Abd pain  CV: Denies CP/SOB  ENDO: No S&Sx of hypoglycemia    MEDS:  finasteride 5 milliGRAM(s) Oral daily  insulin glargine Injectable (LANTUS) 8 Unit(s) SubCutaneous at bedtime  insulin lispro (ADMELOG) corrective regimen sliding scale   SubCutaneous three times a day before meals  insulin lispro (ADMELOG) corrective regimen sliding scale   SubCutaneous at bedtime  insulin lispro Injectable (ADMELOG) 9 Unit(s) SubCutaneous three times a day before meals  levothyroxine 50 MICROGram(s) Oral daily  predniSONE   Tablet 10 milliGRAM(s) Oral daily    cephalexin 500 milliGRAM(s) Oral every 12 hours  nystatin    Suspension 203945 Unit(s) Oral four times a day  trimethoprim   80 mG/sulfamethoxazole 400 mG 1 Tablet(s) Oral daily  valGANciclovir 450 milliGRAM(s) Oral <User Schedule>    Allergies    No Known Allergies        PE:  General: Male lying in bed. NAD>   Vital Signs Last 24 Hrs  T(C): 36.4 (22 Aug 2022 14:10), Max: 36.8 (21 Aug 2022 21:00)  T(F): 97.6 (22 Aug 2022 14:10), Max: 98.2 (21 Aug 2022 21:00)  HR: 70 (22 Aug 2022 14:10) (70 - 77)  BP: 134/60 (22 Aug 2022 14:10) (134/60 - 184/77)  BP(mean): 111 (22 Aug 2022 05:35) (95 - 124)  RR: 20 (22 Aug 2022 14:10) (17 - 20)  SpO2: 99% (22 Aug 2022 14:10) (95% - 99%)    Parameters below as of 22 Aug 2022 14:10  Patient On (Oxygen Delivery Method): room air      Abd: Soft, NT,ND,   Extremities: Warm.  Neuro: Alert. Appropriate.     LABS:  POCT Blood Glucose.: 163 mg/dL (08-22-22 @ 12:38)  POCT Blood Glucose.: 161 mg/dL (08-22-22 @ 07:51)  POCT Blood Glucose.: 144 mg/dL (08-21-22 @ 21:26)  POCT Blood Glucose.: 200 mg/dL (08-21-22 @ 18:10)  POCT Blood Glucose.: 255 mg/dL (08-21-22 @ 12:30)  POCT Blood Glucose.: 290 mg/dL (08-21-22 @ 08:31)  POCT Blood Glucose.: 192 mg/dL (08-20-22 @ 21:37)  POCT Blood Glucose.: 122 mg/dL (08-20-22 @ 17:22)  POCT Blood Glucose.: 170 mg/dL (08-20-22 @ 12:44)  POCT Blood Glucose.: 182 mg/dL (08-20-22 @ 08:29)  POCT Blood Glucose.: 221 mg/dL (08-19-22 @ 21:12)  POCT Blood Glucose.: 140 mg/dL (08-19-22 @ 17:26)                            7.2    9.09  )-----------( 447      ( 22 Aug 2022 06:54 )             23.3       08-22    135  |  92<L>  |  98<H>  ----------------------------<  143<H>  4.3   |  23  |  5.79<H>    eGFR: 10<L>    Ca    9.2      08-22  Mg     2.2     08-22  Phos  4.4     08-22    TPro  6.9  /  Alb  2.7<L>  /  TBili  0.3  /  DBili  0.2  /  AST  24  /  ALT  21  /  AlkPhos  351<H>  08-22      A1C with Estimated Average Glucose Result: 6.0 % (06-30-22 @ 05:49)  A1C with Estimated Average Glucose Result: 6.6 % (04-24-22 @ 17:12)  A1C with Estimated Average Glucose Result: 7.3 % (02-04-22 @ 13:42)  A1C with Estimated Average Glucose Result: 7.2 % (02-04-22 @ 05:48)          Contact number: [email or web address] [phone] or [phone]

## 2022-08-22 NOTE — PROGRESS NOTE ADULT - TIME BILLING
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 No  DSA.   s/p pulse dose steroids. remains oliguric and dialysis dependent. Will repeat allograft sonogram  Last hemodialysis was on 8/19 with fluid removal  Hemodialysis with PRBC today  IS meds- on Sirolimus , target trough 4-6 ng/ml  mg po bid and prednisone. Has had Belatacept 3 weeks ago.  AMS , seizures - Noted recent medication changes. Improved mental status, communicative   DM -  on Lantus and lispro. Adjusted for optimized glycemic control, monitor PO intake  HTN- Suboptimal control, adjusted hydralazine dose on 8/21, hemodialysis with fluid removal today  Cellulitis over RLQ - Was on Vancomycin . CT A/P on 8/6 revealed a retroperitoneal hematoma. Txp USG 8/6  - Patent renal artery vasculature. Stable appearance the transplant kidney .Persistent elevated resistive indices. Diffuse abdominal wall edematous changes right lower quadrant without focal collection, cultures from 8/3  negative so far. Skin bx done on 8/8/22 noted findings. ID follow up noted.    Will maintain on antibiotics,   Will repeat ultrasound   I have seen the patient and reviewed dialysis prescription  . Dialysis access noted.  Dialysis prescription has been adjusted for optimized control of volume status, uremia and electrolytes. Management of additional metabolic abnormalities/anemia will continue to be addressed on follow up.  I was present during and reviewed clinical and lab data as well as assessment and plan as documented . Please contact if any additional questions with any change in clinical condition or on availability of any additional information or reports.  Plan of care d/w house staff and transplant team

## 2022-08-22 NOTE — PROGRESS NOTE ADULT - NUTRITIONAL ASSESSMENT
This patient has been assessed with a concern for Malnutrition and has been determined to have a diagnosis/diagnoses of Severe protein-calorie malnutrition.    This patient is being managed with:   Diet Consistent Carbohydrate w/Evening Snack-  1000mL Fluid Restriction ([id number])  Supplement Feeding Modality:  Oral  Nepro Cans or Servings Per Day:  3       Frequency:  Daily  Entered: Aug 19 2022  5:52PM

## 2022-08-22 NOTE — PROGRESS NOTE ADULT - PROBLEM SELECTOR PLAN 5
Pt with B/L LE/UE swelling R>L, doppler studies negative of DVT. Continue Subq Heparin. Cannot use Eliquis given interaction with Dilantin. Edema greatly improving with fluid removal. Last HD done on 8/19/22. HD plan as outline above

## 2022-08-22 NOTE — PROGRESS NOTE ADULT - SUBJECTIVE AND OBJECTIVE BOX
Transplant Surgery - Multidisciplinary Progress Note  --------------------------------------------------------------  DDRT     4/21/2022            Present:  Patient seen with multidisciplinary team including Transplant Surgeon: Dr. Mann,  Dr. Davis, Nephrologist: Dr. Alfred, [facility], PGY 3 Dr. Gimenez, nephrology fellow, and unit RN during am rounds. Disciplines not in attendance will be notified of the plan.      HPI: 66M with PMH: DM type II, HTN, CAD s/p CABG in 2020, AFib on Eliquis, CVA in 2019 due to Afib, Seizure d/o following CVA, last episode was on 4/8/22, h/o GIB in 2/2022 EGD with duodenal ulcer.  ESRD due to DM was on HD since 2019.     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  Donor was 58 , KDPI 82%, DCD, single vessels and ureter, HLA mismatch 1, 2, 2. No DSA, cPRA 0%. 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 Re-admitted in May for anemia in the setting of large perinephric hematoma s/p evacuation and repair of arterial anastomosis on 5/2/22. Intra operative biopsy showed no rejection, Creatinine ranging ~2mg/dL.    In Rehab:  He was recently dx with klebsiella UTI with Ertapenem - then switched to Levaquin    He also had parainfluenza pneumonia. Was at rehab progressing well.      Hospital course:  - 6/10: transferred from rehab facility for seizure status epilepticus. Intubated for respiratory protection and transferred to MICU.  EEG showed no seizure activity. Neuro consulted.   - 6/11: Extubated. Found to have R pleural effusion. s/p Thoracentesis 1.3L. Eliquis changed to heparin drip.  Post procedure drop in H&H. 5u PRBC, 2 u FFP.   - 6/11 evening: Transferred to SICU from MICU for further care in setting of hypoxia, significant R pleural effusion, anemia and hemodynamic instability  - 6/11 Intubated at 9pm and sedated on Precedex.  CT placed, 600ml blood drained.  1L total overnight, started pressors  - 6/11 Received Protamine for PTT >100 (was on Heparin drip started for AF), 2 u FFP and 5u PRBC total  - 6/13 s/p VATS 2.2L old blood removed; second chest tube placed  - 6/18-19: chest tubes removed  - 6/21: ?PRES vs. chronic ischemic changes on MRI, off Envarsus, switched to Belatacept 6/23 with good graft function  - 6/25: Tx to SICU for refractory seizures, improved with IV antiepileptic regimen  - 7/10: Downgraded from SICU to floor.   - 7/11: OR-->URETERAL STENT REMOVED  - 7/15: ESBL Kleb UTI (50-90K), completed Ertapenem x 3 Days  - 7/25: Tx to SICU for Sepsis/ elevated LFTS  - 7/27: Shiley placed for HD  - 7/28: ongoing fevers -> started Ertapenem/Fluc  - 7/29: HD restarted + 1U PRBC.  IR bx with 2A rejection, started pulse steroids. LFTs have improved  - 8/4: refractory seizures  - 8/8: s/p derm bx with no acute finding  - 8/8: s/p permacath exchange  - 8/16: One seizure on EEG medications changed added brivaracetam    Interval events:   - no acute overnight events  - AOX3 this am, lethargic  - Last HD 8/19, removed 2L.    - Erythema around wound unchanged since yesterday. Off Vanco since 8/18.  - Started Keflex on 8/20 for poss wound cellulitis.     Immunosuppression  Maintenance: Sirolimus aper level,  Cellcept to 250 BID, Pred 10mg po qd   Ongoing monitoring for signs of rejection.    Potential Discharge date: pending clinical improvement.   Education:  Medications  Plan of care:  See Below           REVIEW OF SYSTEMS  --------------------------------------------------------------------------------  unable to obtain full accurate ROS, but denies CP, SOB, HA, dizziness, N/V/D/C, abd discomfort.     PHYSICAL EXAM:  Constitutional: awake, weak  Eyes: Anicteric  ENMT: nc/at  Respiratory: not tachypneic, non-labored on RA    Cardiovascular: normotensive, regular rate on monitor   Gastrointestinal: Soft, non distended, nontender, RLQ incisional/groin ecchymotic and left flunk erythem  Genitourinary: anuric on HD   Extremities: SCD's in place and working bilaterally, overall with improving edema  Vascular: Palpable dp pulses bilaterally  Neurological: AAOx3  Skin:  As noted above. No ulcerations   Musculoskeletal: Moving all extremities  Psychiatric: overall calm, following directions     Transplant Surgery - Multidisciplinary Progress Note  --------------------------------------------------------------  DDRT     4/21/2022            Present:  Patient seen with multidisciplinary team including Transplant Surgeon: Dr. Mann,  Dr. Davis, Nephrologist: Dr. Alfred, [facility], PGY 3 Dr. Gimenez, nephrology fellow, and unit RN during am rounds. Disciplines not in attendance will be notified of the plan.      HPI: 66M with PMH: DM type II, HTN, CAD s/p CABG in 2020, AFib on Eliquis, CVA in 2019 due to Afib, Seizure d/o following CVA, last episode was on 4/8/22, h/o GIB in 2/2022 EGD with duodenal ulcer.  ESRD due to DM was on HD since 2019.     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  Donor was 58 , KDPI 82%, DCD, single vessels and ureter, HLA mismatch 1, 2, 2. No DSA, cPRA 0%. 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 Re-admitted in May for anemia in the setting of large perinephric hematoma s/p evacuation and repair of arterial anastomosis on 5/2/22. Intra operative biopsy showed no rejection, Creatinine ranging ~2mg/dL.    In Rehab:  He was recently dx with klebsiella UTI with Ertapenem - then switched to Levaquin    He also had parainfluenza pneumonia. Was at rehab progressing well.      Hospital course:  - 6/10: transferred from rehab facility for seizure status epilepticus. Intubated for respiratory protection and transferred to MICU.  EEG showed no seizure activity. Neuro consulted.   - 6/11: Extubated. Found to have R pleural effusion. s/p Thoracentesis 1.3L. Eliquis changed to heparin drip.  Post procedure drop in H&H. 5u PRBC, 2 u FFP.   - 6/11 evening: Transferred to SICU from MICU for further care in setting of hypoxia, significant R pleural effusion, anemia and hemodynamic instability  - 6/11 Intubated at 9pm and sedated on Precedex.  CT placed, 600ml blood drained.  1L total overnight, started pressors  - 6/11 Received Protamine for PTT >100 (was on Heparin drip started for AF), 2 u FFP and 5u PRBC total  - 6/13 s/p VATS 2.2L old blood removed; second chest tube placed  - 6/18-19: chest tubes removed  - 6/21: ?PRES vs. chronic ischemic changes on MRI, off Envarsus, switched to Belatacept 6/23 with good graft function  - 6/25: Tx to SICU for refractory seizures, improved with IV antiepileptic regimen  - 7/10: Downgraded from SICU to floor.   - 7/11: OR-->URETERAL STENT REMOVED  - 7/15: ESBL Kleb UTI (50-90K), completed Ertapenem x 3 Days  - 7/25: Tx to SICU for Sepsis/ elevated LFTS  - 7/27: Shiley placed for HD  - 7/28: ongoing fevers -> started Ertapenem/Fluc  - 7/29: HD restarted + 1U PRBC.  IR bx with 2A rejection, started pulse steroids. LFTs have improved  - 8/4: refractory seizures  - 8/8: s/p derm bx with no acute finding  - 8/8: s/p permacath exchange  - 8/16: One seizure on EEG medications changed added brivaracetam    Interval events:   - no acute overnight events  - AOX3 this am, lethargic  - Last HD 8/19, removed 2L.    - Erythema around wound unchanged since yesterday. Off Vanco since 8/18.  - Started Keflex on 8/20 for poss wound cellulitis.     Immunosuppression  Maintenance: Sirolimus aper level,  Cellcept to 250 BID, Pred 10mg po qd   Ongoing monitoring for signs of rejection.    Potential Discharge date: pending clinical improvement.   Education:  Medications  Plan of care:  See Below      MEDICATIONS  (STANDING):  amLODIPine   Tablet 10 milliGRAM(s) Oral daily  brivaracetam 50 milliGRAM(s) Oral two times a day  cephalexin 500 milliGRAM(s) Oral every 12 hours  chlorhexidine 2% Cloths 1 Application(s) Topical <User Schedule>  doxazosin 4 milliGRAM(s) Oral <User Schedule>  epoetin cortney-epbx (RETACRIT) Injectable 24454 Unit(s) SubCutaneous <User Schedule>  finasteride 5 milliGRAM(s) Oral daily  heparin   Injectable 5000 Unit(s) SubCutaneous every 12 hours  hydrALAZINE 75 milliGRAM(s) Oral three times a day  insulin glargine Injectable (LANTUS) 8 Unit(s) SubCutaneous at bedtime  insulin lispro (ADMELOG) corrective regimen sliding scale   SubCutaneous three times a day before meals  insulin lispro (ADMELOG) corrective regimen sliding scale   SubCutaneous at bedtime  insulin lispro Injectable (ADMELOG) 9 Unit(s) SubCutaneous three times a day before meals  levothyroxine 50 MICROGram(s) Oral daily  melatonin 6 milliGRAM(s) Oral at bedtime  mycophenolate mofetil 250 milliGRAM(s) Oral <User Schedule>  nystatin    Suspension 765172 Unit(s) Oral four times a day  pantoprazole    Tablet 40 milliGRAM(s) Oral <User Schedule>  phenytoin   Capsule 200 milliGRAM(s) Oral two times a day  polyethylene glycol 3350 17 Gram(s) Oral daily  predniSONE   Tablet 10 milliGRAM(s) Oral daily  senna 2 Tablet(s) Oral at bedtime  sirolimus 6 milliGRAM(s) Oral <User Schedule>  trimethoprim   80 mG/sulfamethoxazole 400 mG 1 Tablet(s) Oral daily  valGANciclovir 450 milliGRAM(s) Oral <User Schedule>    MEDICATIONS  (PRN):  acetaminophen     Tablet .. 650 milliGRAM(s) Oral every 6 hours PRN Mild Pain (1 - 3)  diphenhydrAMINE 25 milliGRAM(s) Oral at bedtime PRN Insomnia      PAST MEDICAL & SURGICAL HISTORY:  Hypertension      Diabetes      Dyslipidemia      CAD (Coronary Artery Disease)  with Stents in 06/2009 and 6/2019, s/p off-pump C3L on 8/13/20      Hypothyroidism      CVA (cerebral vascular accident)  12/13/19 with residual bilateral weakness      Anemia      PEG (percutaneous endoscopic gastrostomy) status  removed July 2020      Intubation of airway performed without difficulty  Dec 2019  CVA c/b status epilepticus requiring intubation and PEG in 12/2019-      ESRD on dialysis  M/W/F      Seizures  after CVA, last seizure was last week 4/07/22      History of insertion of stent into coronary artery bypass graft  2009 and 2019      S/P CABG x 3  off pump C3L on 8/13/20          Vital Signs Last 24 Hrs  T(C): 36.7 (22 Aug 2022 08:41), Max: 37 (21 Aug 2022 12:45)  T(F): 98.1 (22 Aug 2022 08:41), Max: 98.6 (21 Aug 2022 12:45)  HR: 74 (22 Aug 2022 08:41) (73 - 77)  BP: 142/65 (22 Aug 2022 08:41) (142/65 - 184/77)  BP(mean): 111 (22 Aug 2022 05:35) (95 - 124)  RR: 20 (22 Aug 2022 08:41) (17 - 20)  SpO2: 95% (22 Aug 2022 08:41) (95% - 98%)    Parameters below as of 22 Aug 2022 08:41  Patient On (Oxygen Delivery Method): room air        I&O's Summary    21 Aug 2022 07:01  -  22 Aug 2022 07:00  --------------------------------------------------------  IN: 870 mL / OUT: 0 mL / NET: 870 mL                              7.2    9.09  )-----------( 447      ( 22 Aug 2022 06:54 )             23.3     08-22    135  |  92<L>  |  98<H>  ----------------------------<  143<H>  4.3   |  23  |  5.79<H>    Ca    9.2      22 Aug 2022 06:56  Phos  4.4     08-22  Mg     2.2     08-22    TPro  6.9  /  Alb  2.7<L>  /  TBili  0.3  /  DBili  0.2  /  AST  24  /  ALT  21  /  AlkPhos  351<H>  08-22                       REVIEW OF SYSTEMS  --------------------------------------------------------------------------------  unable to obtain full accurate ROS, but denies CP, SOB, HA, dizziness, N/V/D/C, abd discomfort.     PHYSICAL EXAM:  Constitutional: awake, weak  Eyes: Anicteric  ENMT: nc/at  Respiratory: not tachypneic, non-labored on RA    Cardiovascular: normotensive, regular rate on monitor   Gastrointestinal: Soft, non distended, nontender, RLQ incisional/groin ecchymotic and left flunk erythem  Genitourinary: anuric on HD   Extremities: SCD's in place and working bilaterally, overall with improving edema  Vascular: Palpable dp pulses bilaterally  Neurological: AAOx3  Skin:  As noted above. No ulcerations   Musculoskeletal: Moving all extremities  Psychiatric: overall calm, following directions

## 2022-08-22 NOTE — PROGRESS NOTE ADULT - PROBLEM SELECTOR PLAN 1
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 No  DSA.   s/p pulse dose steroids. Thymo was not given to treat the rejection as he is too frail and at increased risk for infections. Now with failed allograft function, Remains anuric and is dialysis dependent. Pt underwent IR guided HD catheter exchange on 8/8/22. Has been receiving dialysis daily. Last HD done on 8/19/22. Plan for HD today. Monitor for labs.

## 2022-08-22 NOTE — PROGRESS NOTE ADULT - ASSESSMENT
66M with ESRD s/p PermCath removal 5/10/22 s/p Rt DDRT 4/21/22,  CVA (2019), Afib on apixaban, seizure activity, CAD s/p stents, CABG (2020), HTN, HLD, DM on insulin, gastric/duodenal ulcer, recent hospitalization 4/28/22 -5/10/22 with weakness/anemia found to have perinephric hematoma requiring evacuation and repair of bleeding arterial anastomosis admitted 6/10 for status epilepticus requiring intubation for hypoxic respiratory failure.     s/p LP which was not suggestive of infectious process  U/A (7/12) 161 WBC  UCx (7/13) 50-99K ESBL Klebsiella   s/p 3 days of Ertapenem  UA (7/25) with 40 WBC  UCx (7/25) 50-99k ESBL Klebsiella     RUQ (7/25) with hepatomegaly   CT c/a/p (7/25) with only small perinephric fluid collection, small volume ascites.  Doppler US R Kidney (7/27) with mild thickening of collecting system and ureter and small   Doppler US R Kidney (7/29) with fullness of collecting system and continued urothelial thickening.     RVP (7/25) Negative  CMV PCR (7/22) Negative  EBV PCR (7/25) Negative   HBV PCR (7/25) Negative   HHV-6 PCR (7/25) POSITIVE   Parvovirus IgM and PCR (7/26) Negative   HSV 1/2 PCR (7/26) Negative   Adenovirus PCR (7/26) Negative    CT A/P (8/5) with No drainable fluid collections and Right psoas retroperitoneal hematoma.    Antibiotic Course:  Vancomycin: 6/11, 6/26 --> 6/29, 7/25, 8/4 -->   Ertapenem: 6/14 --> 6/18, 6/22 --> 6/23, 7/15 --> 7/17, 7/28 --> 8/3  Meropenem: 6/26 --> 6/29, 7/25 --> 7/28  Fluconazole: 6/11 --> 6/21, 6/26 --> 7/6, 7/26 --> 8/10    #Rash, Erythema overlying Renal Transplant, Leukocytosis, Fever  Developed after 10 days of Carbapenem Therapy  No significant improvement after Vancomycin initiation  Concern for atypical infectious etiology (or noninfectious process)  Dermatology Surgical Path (8/8) nonspecific findings including edema, telangiectasia and sparse mixed infiltrate of lymphocytes and neutrophils, culture prelim negative, AFB negative.   Findings are nonspecific ?Cellulitis  --If continued erythema/pain at abdominal wall would consider biopsy of abdominal wall muscle for additional culture yield  --Follow up on Dermatology Fungal and AFB Cultures    #Positive Blood Culture  BCx (8/8) with Coagulase negative staph 1 out of 4   BCx (8/9) NGTD  Suspect Coagulase negative staph is a contaminant  --Follow up on prelim cultures    #ESRD s/p DDRT (4/21/2022) (CMV +/+, EBV +/+)  --Continue Bactrim SS PO Q24H for PCP PPx  --Continue Valcyte 450 mg PO Mon/Wed/Fri for CMV PPx    I will follow the patient as needed. Please feel free to contact me with any further questions or concerns.    Carlton Hoover M.D.  [facility] Division of Infectious Disease  8AM-5PM Monday - Friday: Available on [employer] Teams  After Hours and Holidays (or if no response on [employer] Teams): Please contact the Infectious Diseases Office at [phone]

## 2022-08-22 NOTE — PROGRESS NOTE ADULT - SUBJECTIVE AND OBJECTIVE BOX
Follow Up:      Interval History:    REVIEW OF SYSTEMS  [  ] ROS unobtainable because:    [  ] All other systems negative except as noted below    Constitutional:  [ ] fever [ ] chills  [ ] weight loss  [ ] weakness  Skin:  [ ] rash [ ] phlebitis	  Eyes: [ ] icterus [ ] pain  [ ] discharge	  ENMT: [ ] sore throat  [ ] thrush [ ] ulcers [ ] exudates  Respiratory: [ ] dyspnea [ ] hemoptysis [ ] cough [ ] sputum	  Cardiovascular:  [ ] chest pain [ ] palpitations [ ] edema	  Gastrointestinal:  [ ] nausea [ ] vomiting [ ] diarrhea [ ] constipation [ ] pain	  Genitourinary:  [ ] dysuria [ ] frequency [ ] hematuria [ ] discharge [ ] flank pain  [ ] incontinence  Musculoskeletal:  [ ] myalgias [ ] arthralgias [ ] arthritis  [ ] back pain  Neurological:  [ ] headache [ ] seizures  [ ] confusion/altered mental status    Allergies  No Known Allergies        ANTIMICROBIALS:  cephalexin 500 every 12 hours  nystatin    Suspension 009488 four times a day  trimethoprim   80 mG/sulfamethoxazole 400 mG 1 daily  valGANciclovir 450 <User Schedule>      OTHER MEDS:  MEDICATIONS  (STANDING):  acetaminophen     Tablet .. 650 every 6 hours PRN  amLODIPine   Tablet 10 daily  brivaracetam 50 two times a day  diphenhydrAMINE 25 at bedtime PRN  doxazosin 4 <User Schedule>  epoetin cortney-epbx (RETACRIT) Injectable 01817 <User Schedule>  finasteride 5 daily  heparin   Injectable 5000 every 12 hours  hydrALAZINE 75 three times a day  insulin glargine Injectable (LANTUS) 8 at bedtime  insulin lispro (ADMELOG) corrective regimen sliding scale  three times a day before meals  insulin lispro (ADMELOG) corrective regimen sliding scale  at bedtime  insulin lispro Injectable (ADMELOG) 9 three times a day before meals  levothyroxine 50 daily  melatonin 6 at bedtime  mycophenolate mofetil 250 <User Schedule>  pantoprazole    Tablet 40 <User Schedule>  phenytoin   Capsule 200 two times a day  polyethylene glycol 3350 17 daily  predniSONE   Tablet 10 daily  senna 2 at bedtime  sirolimus 6 <User Schedule>      Vital Signs Last 24 Hrs  T(C): 36.4 (22 Aug 2022 14:10), Max: 36.8 (21 Aug 2022 21:00)  T(F): 97.6 (22 Aug 2022 14:10), Max: 98.2 (21 Aug 2022 21:00)  HR: 70 (22 Aug 2022 14:10) (70 - 77)  BP: 134/60 (22 Aug 2022 14:10) (134/60 - 184/77)  BP(mean): 111 (22 Aug 2022 05:35) (95 - 124)  RR: 20 (22 Aug 2022 14:10) (17 - 20)  SpO2: 99% (22 Aug 2022 14:10) (95% - 99%)    Parameters below as of 22 Aug 2022 14:10  Patient On (Oxygen Delivery Method): room air        PHYSICAL EXAMINATION:  General: Alert and Awake, NAD  HEENT: PERRL, EOMI  Neck: Supple  Cardiac: RRR, No M/R/G  Resp: CTAB, No Wh/Rh/Ra  Abdomen: NBS, NT/ND, No HSM, No rigidity or guarding  MSK: No LE edema. No Calf tenderness  : No tucker  Skin: No rashes or lesions. Skin is warm and dry to the touch.   Neuro: Alert and Awake. CN 2-12 Grossly intact. Moves all four extremities spontaneously.  Psych: Calm, Pleasant, Cooperative                          7.2    9.09  )-----------( 447      ( 22 Aug 2022 06:54 )             23.3       08-22    135  |  92<L>  |  98<H>  ----------------------------<  143<H>  4.3   |  23  |  5.79<H>    Ca    9.2      22 Aug 2022 06:56  Phos  4.4     08-22  Mg     2.2     08-22    TPro  6.9  /  Alb  2.7<L>  /  TBili  0.3  /  DBili  0.2  /  AST  24  /  ALT  21  /  AlkPhos  351<H>  08-22          MICROBIOLOGY:  Vancomycin Level, Random: 6.0 ug/mL (08-22-22 @ 06:54)  v  .Blood Blood-Peripheral  08-14-22   No Growth Final  --  --      .Blood Blood-Peripheral  08-09-22   No Growth Final  --  --      .Tissue Other  08-08-22   No growth  --    No polymorphonuclear cells seen seen per low power field  No organisms seen per oil power field      .Blood Blood-Peripheral  08-08-22   Growth in aerobic bottle: Staphylococcus hominis  Growth in aerobic bottle: Staphylococcus epidermidis  Coag Negative Staphylococcus  Single set isolate, possible contaminant. Contact  Microbiology if susceptibility testing clinically  indicated.  ***Blood Panel PCR results on this specimen are available  approximately 3 hours after the Gram stain result.***  Gram stain, PCR, and/or culture results may not always  correspond due to difference in methodologies.  ************************************************************  This PCR assay was performed by multiplex PCR. This  Assay tests for 66 bacterial and resistance gene targets.  Please refer to the [facility] test directory  at [email or web address] for details.  --  Blood Culture PCR      .Blood Blood  08-03-22   No Growth Final  --  --      .Blood Blood-Peripheral  07-30-22   No Growth Final  --  --      .Blood Blood-Peripheral  07-30-22   No Growth Final  --  --      .Blood Blood  07-27-22   No growth  --  --      Catheterized Catheterized  07-25-22   50,000 - 99,000 CFU/mL Klebsiella pneumoniae ESBL  --  Klebsiella pneumoniae ESBL      .Blood Blood  07-25-22   No Growth Final  --  --      .Blood Blood  07-25-22   No Growth Final  --  --                RADIOLOGY:    <The imaging below has been reviewed and visualized by me independently. Findings as detailed in report below> Follow Up:  abdominal wall rash    Interval History: less pain around abdominal wall erythema today.     REVIEW OF SYSTEMS  [  ] ROS unobtainable because:    [ x ] All other systems negative except as noted below    Constitutional:  [ ] fever [ ] chills  [ ] weight loss  [ ] weakness  Skin:  [ x] rash [ ] phlebitis	  Eyes: [ ] icterus [ ] pain  [ ] discharge	  ENMT: [ ] sore throat  [ ] thrush [ ] ulcers [ ] exudates  Respiratory: [ ] dyspnea [ ] hemoptysis [ ] cough [ ] sputum	  Cardiovascular:  [ ] chest pain [ ] palpitations [ ] edema	  Gastrointestinal:  [ ] nausea [ ] vomiting [ ] diarrhea [ ] constipation [ ] pain	  Genitourinary:  [ ] dysuria [ ] frequency [ ] hematuria [ ] discharge [ ] flank pain  [ ] incontinence  Musculoskeletal:  [ ] myalgias [ ] arthralgias [ ] arthritis  [ ] back pain  Neurological:  [ ] headache [ ] seizures  [ ] confusion/altered mental status      Allergies  No Known Allergies        ANTIMICROBIALS:  cephalexin 500 every 12 hours  nystatin    Suspension 630188 four times a day  trimethoprim   80 mG/sulfamethoxazole 400 mG 1 daily  valGANciclovir 450 <User Schedule>      OTHER MEDS:  MEDICATIONS  (STANDING):  acetaminophen     Tablet .. 650 every 6 hours PRN  amLODIPine   Tablet 10 daily  brivaracetam 50 two times a day  diphenhydrAMINE 25 at bedtime PRN  doxazosin 4 <User Schedule>  epoetin cortney-epbx (RETACRIT) Injectable 48913 <User Schedule>  finasteride 5 daily  heparin   Injectable 5000 every 12 hours  hydrALAZINE 75 three times a day  insulin glargine Injectable (LANTUS) 8 at bedtime  insulin lispro (ADMELOG) corrective regimen sliding scale  three times a day before meals  insulin lispro (ADMELOG) corrective regimen sliding scale  at bedtime  insulin lispro Injectable (ADMELOG) 9 three times a day before meals  levothyroxine 50 daily  melatonin 6 at bedtime  mycophenolate mofetil 250 <User Schedule>  pantoprazole    Tablet 40 <User Schedule>  phenytoin   Capsule 200 two times a day  polyethylene glycol 3350 17 daily  predniSONE   Tablet 10 daily  senna 2 at bedtime  sirolimus 6 <User Schedule>      Vital Signs Last 24 Hrs  T(C): 36.4 (22 Aug 2022 14:10), Max: 36.8 (21 Aug 2022 21:00)  T(F): 97.6 (22 Aug 2022 14:10), Max: 98.2 (21 Aug 2022 21:00)  HR: 70 (22 Aug 2022 14:10) (70 - 77)  BP: 134/60 (22 Aug 2022 14:10) (134/60 - 184/77)  BP(mean): 111 (22 Aug 2022 05:35) (95 - 124)  RR: 20 (22 Aug 2022 14:10) (17 - 20)  SpO2: 99% (22 Aug 2022 14:10) (95% - 99%)    Parameters below as of 22 Aug 2022 14:10  Patient On (Oxygen Delivery Method): room air    PHYSICAL EXAMINATION:  General: Alert and Awake, NAD  Cardiac: RRR, No M/R/G  Resp: CTAB, No Wh/Rh/Ra  Abdomen: RLQ with erythema and induration overlying the renal transplant site with extension into the groin.   MSK: No LE edema. No Calf tenderness  Skin: No rashes or lesions. Skin is warm and dry to the touch.   Neuro: Alert and Awake. CN 2-12 Grossly intact. Moves all four extremities spontaneously.  Psych: Calm, Pleasant, Cooperative                            7.2    9.09  )-----------( 447      ( 22 Aug 2022 06:54 )             23.3       08-22    135  |  92<L>  |  98<H>  ----------------------------<  143<H>  4.3   |  23  |  5.79<H>    Ca    9.2      22 Aug 2022 06:56  Phos  4.4     08-22  Mg     2.2     08-22    TPro  6.9  /  Alb  2.7<L>  /  TBili  0.3  /  DBili  0.2  /  AST  24  /  ALT  21  /  AlkPhos  351<H>  08-22          MICROBIOLOGY:  Vancomycin Level, Random: 6.0 ug/mL (08-22-22 @ 06:54)  v  .Blood Blood-Peripheral  08-14-22   No Growth Final  --  --      .Blood Blood-Peripheral  08-09-22   No Growth Final  --  --      .Tissue Other  08-08-22   No growth  --    No polymorphonuclear cells seen seen per low power field  No organisms seen per oil power field      .Blood Blood-Peripheral  08-08-22   Growth in aerobic bottle: Staphylococcus hominis  Growth in aerobic bottle: Staphylococcus epidermidis  Coag Negative Staphylococcus  Single set isolate, possible contaminant. Contact  Microbiology if susceptibility testing clinically  indicated.  ***Blood Panel PCR results on this specimen are available  approximately 3 hours after the Gram stain result.***  Gram stain, PCR, and/or culture results may not always  correspond due to difference in methodologies.  ************************************************************  This PCR assay was performed by multiplex PCR. This  Assay tests for 66 bacterial and resistance gene targets.  Please refer to the [facility] test directory  at [email or web address] for details.  --  Blood Culture PCR      .Blood Blood  08-03-22   No Growth Final  --  --      .Blood Blood-Peripheral  07-30-22   No Growth Final  --  --      .Blood Blood-Peripheral  07-30-22   No Growth Final  --  --      .Blood Blood  07-27-22   No growth  --  --      Catheterized Catheterized  07-25-22   50,000 - 99,000 CFU/mL Klebsiella pneumoniae ESBL  --  Klebsiella pneumoniae ESBL      .Blood Blood  07-25-22   No Growth Final  --  --      .Blood Blood  07-25-22   No Growth Final  --  --      RADIOLOGY:    <The imaging below has been reviewed and visualized by me independently. Findings as detailed in report below>    < from: Xray Chest 1 View- PORTABLE-Routine (Xray Chest 1 View- PORTABLE-Routine .) (08.17.22 @ 22:35) >  IMPRESSION:  Moderate pulmonary vascular congestion.    < end of copied text >

## 2022-08-23 NOTE — PROGRESS NOTE ADULT - PROBLEM SELECTOR PLAN 5
Pt with B/L LE/UE swelling R>L, doppler studies negative of DVT. Continue Subq Heparin. Cannot use Eliquis given interaction with Dilantin. Edema greatly improving with fluid removal. Last HD done on 8/22/22. HD plan as outline above.

## 2022-08-23 NOTE — PROGRESS NOTE ADULT - SUBJECTIVE AND OBJECTIVE BOX
[facility] DIVISION OF KIDNEY DISEASES AND HYPERTENSION -- FOLLOW UP NOTE  --------------------------------------------------------------------------------  Chief Complaint:    24 hour events/subjective:        PAST HISTORY  --------------------------------------------------------------------------------  No significant changes to PMH, PSH, FHx, SHx, unless otherwise noted    ALLERGIES & MEDICATIONS  --------------------------------------------------------------------------------  Allergies    No Known Allergies    Intolerances      Standing Inpatient Medications  amLODIPine   Tablet 10 milliGRAM(s) Oral daily  brivaracetam 50 milliGRAM(s) Oral two times a day  cephalexin 500 milliGRAM(s) Oral every 12 hours  chlorhexidine 2% Cloths 1 Application(s) Topical <User Schedule>  doxazosin 4 milliGRAM(s) Oral <User Schedule>  epoetin cortney-epbx (RETACRIT) Injectable 92977 Unit(s) SubCutaneous <User Schedule>  finasteride 5 milliGRAM(s) Oral daily  heparin   Injectable 5000 Unit(s) SubCutaneous every 12 hours  hydrALAZINE 75 milliGRAM(s) Oral three times a day  insulin glargine Injectable (LANTUS) 8 Unit(s) SubCutaneous at bedtime  insulin lispro (ADMELOG) corrective regimen sliding scale   SubCutaneous three times a day before meals  insulin lispro (ADMELOG) corrective regimen sliding scale   SubCutaneous at bedtime  insulin lispro Injectable (ADMELOG) 9 Unit(s) SubCutaneous three times a day before meals  levothyroxine 50 MICROGram(s) Oral daily  melatonin 6 milliGRAM(s) Oral at bedtime  mycophenolate mofetil 250 milliGRAM(s) Oral <User Schedule>  nystatin    Suspension 877206 Unit(s) Oral four times a day  pantoprazole    Tablet 40 milliGRAM(s) Oral <User Schedule>  phenytoin   Capsule 200 milliGRAM(s) Oral two times a day  polyethylene glycol 3350 17 Gram(s) Oral daily  predniSONE   Tablet 10 milliGRAM(s) Oral daily  senna 2 Tablet(s) Oral at bedtime  sirolimus 6 milliGRAM(s) Oral <User Schedule>  trimethoprim   80 mG/sulfamethoxazole 400 mG 1 Tablet(s) Oral daily  valGANciclovir 450 milliGRAM(s) Oral <User Schedule>    PRN Inpatient Medications  acetaminophen     Tablet .. 650 milliGRAM(s) Oral every 6 hours PRN  diphenhydrAMINE 25 milliGRAM(s) Oral at bedtime PRN      REVIEW OF SYSTEMS  --------------------------------------------------------------------------------  Gen: No fevers/chills  Respiratory: No dyspnea, cough,   CV: No chest pain, PND, orthopnea  GI: No abdominal pain, diarrhea, constipation, nausea, vomiting  Transplant: No pain  : No increased frequency, dysuria, hematuria   MSK: No edema  Neuro: No dizziness/lightheadedness    All other systems were reviewed and are negative, except as noted.    VITALS/PHYSICAL EXAM  --------------------------------------------------------------------------------  T(C): 36.4 (08-23-22 @ 09:01), Max: 36.9 (08-23-22 @ 01:00)  HR: 78 (08-23-22 @ 09:01) (70 - 85)  BP: 167/75 (08-23-22 @ 09:01) (134/60 - 167/75)  RR: 20 (08-23-22 @ 09:01) (18 - 20)  SpO2: 95% (08-23-22 @ 09:01) (94% - 99%)  Wt(kg): --        08-22-22 @ 07:01  -  08-23-22 @ 07:00  --------------------------------------------------------  IN: 650 mL / OUT: 2000 mL / NET: -1350 mL      Physical Exam:  	Gen: NAD, able to speak in full sentences   	HEENT: PERRL, MMM   	Pulm: CTA B/L, no crackles   	CV: RRR, S1S2+  	Abd: +BS, soft          Transplant: No tenderness, swelling  	: No suprapubic tenderness  	MSK: no edema   	Psych: Normal affect and mood  	Skin: Warm          Access:    LABS/STUDIES  --------------------------------------------------------------------------------              8.2    7.59  >-----------<  482      [08-23-22 @ 07:02]              25.6     138  |  96  |  62  ----------------------------<  158      [08-23-22 @ 06:47]  3.8   |  23  |  3.88        Ca     9.0     [08-23-22 @ 06:47]      Mg     2.0     [08-23-22 @ 06:47]      Phos  3.4     [08-23-22 @ 06:47]    TPro  6.4  /  Alb  2.6  /  TBili  0.3  /  DBili  0.2  /  AST  21  /  ALT  23  /  AlkPhos  332  [08-23-22 @ 06:47]      PTT: 30.4       [08-23-22 @ 07:01]      Creatinine Trend:  SCr 3.88 [08-23 @ 06:47]  SCr 5.79 [08-22 @ 06:56]  SCr 4.65 [08-21 @ 06:44]  SCr 3.59 [08-20 @ 07:12]  SCr 4.80 [08-19 @ 07:29]        Sirolimus (Rapamycin) Level, Serum: 4.7 ng/mL (08-22 @ 06:53)  Sirolimus (Rapamycin) Level, Serum: 4.1 ng/mL (08-21 @ 06:44)  Sirolimus (Rapamycin) Level, Serum: 3.2 ng/mL (08-20 @ 07:16)  Sirolimus (Rapamycin) Level, Serum: 2.8 ng/mL (08-19 @ 07:30)        Urinalysis - [08-16-22 @ 18:49]      Color Yellow / Appearance Slightly Turbid / SG 1.018 / pH 8.5      Gluc 200 mg/dL / Ketone Negative  / Bili Negative / Urobili Negative       Blood Large / Protein >600 / Leuk Est Moderate / Nitrite Positive      RBC 13 / WBC 40 / Hyaline 7 / Gran  / Sq Epi  / Non Sq Epi 4 / Bacteria Moderate      Iron 17, TIBC 171, %sat 10      [05-02-22 @ 13:03]  Ferritin 6336      [07-27-22 @ 13:00]  PTH -- (Ca 9.2)      [02-05-22 @ 10:13]   294  HbA1c 6.0      [02-21-20 @ 08:53]  TSH 4.69      [07-06-22 @ 18:36]  Lipid: chol --, , HDL --, LDL --      [07-27-22 @ 13:00]    HBsAg Nonreact      [07-25-22 @ 11:51]  HCV 0.18, Nonreact      [07-25-22 @ 11:51]       [facility] DIVISION OF KIDNEY DISEASES AND HYPERTENSION -- FOLLOW UP NOTE  --------------------------------------------------------------------------------  Chief Complaint: 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 was seen and examined today. Overnight events noted.   Pt reports RUQ pain - intermittent. Denies N/V, fever, chills, diarrhea or constipation or dizziness. Last Hd done on 8/22/22.    PAST HISTORY  --------------------------------------------------------------------------------  No significant changes to PMH, PSH, FHx, SHx, unless otherwise noted    ALLERGIES & MEDICATIONS  --------------------------------------------------------------------------------  Allergies    No Known Allergies    Intolerances    Standing Inpatient Medications  amLODIPine   Tablet 10 milliGRAM(s) Oral daily  brivaracetam 50 milliGRAM(s) Oral two times a day  cephalexin 500 milliGRAM(s) Oral every 12 hours  chlorhexidine 2% Cloths 1 Application(s) Topical <User Schedule>  doxazosin 4 milliGRAM(s) Oral <User Schedule>  epoetin cortney-epbx (RETACRIT) Injectable 21657 Unit(s) SubCutaneous <User Schedule>  finasteride 5 milliGRAM(s) Oral daily  heparin   Injectable 5000 Unit(s) SubCutaneous every 12 hours  hydrALAZINE 75 milliGRAM(s) Oral three times a day  insulin glargine Injectable (LANTUS) 8 Unit(s) SubCutaneous at bedtime  insulin lispro (ADMELOG) corrective regimen sliding scale   SubCutaneous three times a day before meals  insulin lispro (ADMELOG) corrective regimen sliding scale   SubCutaneous at bedtime  insulin lispro Injectable (ADMELOG) 9 Unit(s) SubCutaneous three times a day before meals  levothyroxine 50 MICROGram(s) Oral daily  melatonin 6 milliGRAM(s) Oral at bedtime  mycophenolate mofetil 250 milliGRAM(s) Oral <User Schedule>  nystatin    Suspension 751377 Unit(s) Oral four times a day  pantoprazole    Tablet 40 milliGRAM(s) Oral <User Schedule>  phenytoin   Capsule 200 milliGRAM(s) Oral two times a day  polyethylene glycol 3350 17 Gram(s) Oral daily  predniSONE   Tablet 10 milliGRAM(s) Oral daily  senna 2 Tablet(s) Oral at bedtime  sirolimus 6 milliGRAM(s) Oral <User Schedule>  trimethoprim   80 mG/sulfamethoxazole 400 mG 1 Tablet(s) Oral daily  valGANciclovir 450 milliGRAM(s) Oral <User Schedule>    PRN Inpatient Medications  acetaminophen     Tablet .. 650 milliGRAM(s) Oral every 6 hours PRN  diphenhydrAMINE 25 milliGRAM(s) Oral at bedtime PRN    REVIEW OF SYSTEMS  --------------------------------------------------------------------------------  Gen: No fevers/chills  Respiratory: No dyspnea, cough,   CV: No chest pain,   GI: See HPI,  Transplant: No pain  : No increased frequency,  MSK: No edema  Neuro: No dizziness/lightheadedness    All other systems were reviewed and are negative, except as noted.    VITALS/PHYSICAL EXAM  --------------------------------------------------------------------------------  T(C): 36.4 (08-23-22 @ 09:01), Max: 36.9 (08-23-22 @ 01:00)  HR: 78 (08-23-22 @ 09:01) (70 - 85)  BP: 167/75 (08-23-22 @ 09:01) (134/60 - 167/75)  RR: 20 (08-23-22 @ 09:01) (18 - 20)  SpO2: 95% (08-23-22 @ 09:01) (94% - 99%)  Wt(kg): --    08-22-22 @ 07:01  -  08-23-22 @ 07:00  --------------------------------------------------------  IN: 650 mL / OUT: 2000 mL / NET: -1350 mL    Physical Exam:  	Gen: NAD, able to speak in full sentences   	HEENT: PERRL, MMM   	Pulm: CTA B/L, no crackles   	CV: RRR, S1S2+  	Abd: +BS, soft, RLQ with erythema and induration overlying the renal transplant site with extension into the groin.               Transplant: No tenderness, swelling  	: No suprapubic tenderness  	MSK: no edema   	Psych: Normal affect and mood  	Skin: Warm              Access: Left IJ tunneled HD catheter +    LABS/STUDIES  --------------------------------------------------------------------------------              8.2    7.59  >-----------<  482      [08-23-22 @ 07:02]              25.6     138  |  96  |  62  ----------------------------<  158      [08-23-22 @ 06:47]  3.8   |  23  |  3.88        Ca     9.0     [08-23-22 @ 06:47]      Mg     2.0     [08-23-22 @ 06:47]      Phos  3.4     [08-23-22 @ 06:47]    TPro  6.4  /  Alb  2.6  /  TBili  0.3  /  DBili  0.2  /  AST  21  /  ALT  23  /  AlkPhos  332  [08-23-22 @ 06:47]    PTT: 30.4       [08-23-22 @ 07:01]    Creatinine Trend:  SCr 3.88 [08-23 @ 06:47]  SCr 5.79 [08-22 @ 06:56]  SCr 4.65 [08-21 @ 06:44]  SCr 3.59 [08-20 @ 07:12]  SCr 4.80 [08-19 @ 07:29]    Sirolimus (Rapamycin) Level, Serum: 4.7 ng/mL (08-22 @ 06:53)  Sirolimus (Rapamycin) Level, Serum: 4.1 ng/mL (08-21 @ 06:44)  Sirolimus (Rapamycin) Level, Serum: 3.2 ng/mL (08-20 @ 07:16)  Sirolimus (Rapamycin) Level, Serum: 2.8 ng/mL (08-19 @ 07:30)    Urinalysis - [08-16-22 @ 18:49]      Color Yellow / Appearance Slightly Turbid / SG 1.018 / pH 8.5      Gluc 200 mg/dL / Ketone Negative  / Bili Negative / Urobili Negative       Blood Large / Protein >600 / Leuk Est Moderate / Nitrite Positive      RBC 13 / WBC 40 / Hyaline 7 / Gran  / Sq Epi  / Non Sq Epi 4 / Bacteria Moderate

## 2022-08-23 NOTE — PROGRESS NOTE ADULT - PROBLEM SELECTOR PLAN 1
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 No  DSA.   s/p pulse dose steroids. Thymo was not given to treat the rejection as he is too frail and at increased risk for infections. Now with failed allograft function, Remains anuric and is dialysis dependent. Pt underwent IR guided HD catheter exchange on 8/8/22. Last HD done on 8/22/22. Tolerated well. Labs reviewed. Plan for UF ~ 2 hrs today. Monitor for labs. Dose meds as per HD.

## 2022-08-23 NOTE — CHART NOTE - NSCHARTNOTEFT_GEN_A_CORE
Nutrition Follow Up Note  Patient seen for malnutrition follow up    Chart reviewed, events noted.    Source: [x] Patient       [x] EMR        [x] RN        [] Family at bedside       [] Other:    -If unable to interview patient: [] Trach/Vent/BiPAP  [] Disoriented/confused/inappropriate to interview as per chart     Pt disoriented as per chart- confirmed information with RN. Pt reports good appetite and PO intake. Noted 100% PO intake and an empty bottle of Nepro at bedside. Pt reports drinking Nepro 2-3 bottles/day. Denies difficulty chewing/swallowing. Pt denies nausea, vomiting, diarrhea, or constipation, last BM ().     Reviewed the importance of adequate kcal and protein intake. Pt denies having further questions/concerns about diet and nutrition - made aware RD remains available.     Diet Order:   Diet, Consistent Carbohydrate w/Evening Snack:   1000mL Fluid Restriction ([id number])  Supplement Feeding Modality:  Oral  Nepro Cans or Servings Per Day:  3       Frequency:  Daily (22)    Weights:   Daily Weight in k.5 (), Weight in k.3 (), Weight in k.5 (), Weight in k.5 (), Weight in k.4 (), Weight in k.5 (), Weight in k.2 () -?accuracy of weight fluctuations as pt with edema and on HD.     Nutritionally Pertinent MEDICATIONS  (STANDING):  amLODIPine   Tablet  cephalexin  doxazosin  finasteride  hydrALAZINE  insulin glargine Injectable (LANTUS)  insulin lispro (ADMELOG) corrective regimen sliding scale  insulin lispro (ADMELOG) corrective regimen sliding scale  insulin lispro Injectable (ADMELOG)  levothyroxine  nystatin    Suspension  pantoprazole    Tablet  polyethylene glycol 3350  predniSONE   Tablet  senna  trimethoprim   80 mG/sulfamethoxazole 400 mG  valGANciclovir    Pertinent Labs: ( @ 06:47): Na 138, BUN 62<H>, Cr 3.88<H>, <H>, K+ 3.8, Phos 3.4, Mg 2.0, Alk Phos 332<H>, ALT/SGPT 23, AST/SGOT 21    A1C with Estimated Average Glucose Result: 6.0 % (22 @ 05:49)  A1C with Estimated Average Glucose Result: 6.6 % (22 @ 17:12)    Finger Sticks:  POCT Blood Glucose.: 226 mg/dL ( @ 11:55)  POCT Blood Glucose.: 152 mg/dL ( @ 08:05)  POCT Blood Glucose.: 140 mg/dL ( @ 21:18)  POCT Blood Glucose.: 186 mg/dL ( @ 17:41)  POCT Blood Glucose.: 163 mg/dL ( @ 12:38)    Skin per nursing documentation: pressure ulcer in sacrum stage 2.   Edema as per flow sheets: +1 right arm.    Estimated Needs:   [x] no change since previous assessment (meets estimated needs for pressure ulcer healing).   [] recalculated:     Previous Nutrition Diagnoses:  [x] Severe Malnutrition   [x] Increased Nutrient Needs     Nutrition Diagnoses are: [x] ongoing - addressed with PO intake encouragement and nutritional supplements.     New Nutrition Diagnosis: [x] Not applicable    Nutrition Care Plan:  [x] In Progress     Nutrition Interventions:     Education Provided:       [x] Yes  [] No    Recommendations:      1. Continue Consistent Carbohydrate with snack diet + fluids per team, currently 1000 ml fluid restriction. Defer diet/fluid consistencies to medical team/SLP recommendations. Will continue to monitor as able and adjust as needed (monitor need for electrolyte restrictions).   2. Continue Nepro 3xday to optimize kcal and protein intake.   3. Encourage PO intake and honor food preferences.   4. Recommend Nephro-Windy if medically feasible to optimize nutrient intake and further aid with pressure ulcer healing.   5. Reviewed the importance of adequate kcal and protein intake.   6. Continue to obtain weights as able to identify changes if any.     Monitoring and Evaluation:   Continue to monitor nutritional intake, tolerance to diet prescription, weights, labs, skin integrity    RD remains available upon request and will follow up per protocol  Carina Fuller MS RDN CDN [facility] CCTD #[phone]

## 2022-08-23 NOTE — PROGRESS NOTE ADULT - NS ATTEND AMEND GEN_ALL_CORE FT
clinically unchanged  eating and making few steps with PT  remains on HD and oliguric  immunosuppression sirolimus, mmf  and steroids

## 2022-08-23 NOTE — PROGRESS NOTE ADULT - ASSESSMENT
66 yr old Male with PMHx ESRD s/p permacath removal 5/10/22 s/p Rt DDRT 4/21/22,  CVA (2019), Afib on apixaban, seizure activity, CAD s/p stents, CABG (2020), HTN, HLD, DM on insulin, gastric/duodenal ulcer, recent hospitalization 4/28/22 -5/10/22 with weakness/anemia found to have perinephric hematoma requiring evacuation and repair of bleeding arterial anastomosis. Curently being treated for UTI with Levaquin Today after developing multiple sz episodes refractory to 5 mg versed IM (EMS) and 2 mg ativan IV in E.D. concerning for status epilepticus requiring intubation for hypoxic respiratory  failure. MICU Consult was called for hypoxic respiratory failure secondary to status epilepticus.  Nephrology consulted for renal failure.     A/P  DCD KTX on 04/21/22  Course complicated by DGF, was on HD until 4/29/22. Readmitted in May for anemia in the setting of large perinephric hematoma s/p evacuation and repair of arterial anastomosis on 5/02/22. Intra operative biopsy showed no rejection.  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   Now with failed allograft function, Remains dialysis dependent.   continue Immunosuppression per transplant team  last HD 08/22/22, plan for PUF today   transplant team on board   monitor BMP, U/O     Immunosuppression per transplant team  - Belatacept: 6/23-8/1/2022 discontinued  - on Sirolimus since 8/1/2022 based on level, Cellcept 250 BID, Prednisone 10 mg QD 8/16  - PPX:  Nystatin, bactrim, valcyte (MWF)        HTN   BP manage by transplant team    monitor BP closely

## 2022-08-23 NOTE — PROGRESS NOTE ADULT - NUTRITIONAL ASSESSMENT
This patient has been assessed with a concern for Malnutrition and has been determined to have a diagnosis/diagnoses of Severe protein-calorie malnutrition.    This patient is being managed with:   Diet Consistent Carbohydrate w/Evening Snack-  1000mL Fluid Restriction ([id number])  Supplement Feeding Modality:  Oral  Nepro Cans or Servings Per Day:  3       Frequency:  Daily  Entered: Aug 19 2022  5:52PM

## 2022-08-23 NOTE — PROGRESS NOTE ADULT - PROBLEM SELECTOR PLAN 2
IS meds- was on Belatacept (stared after rejection).Now on Sirolimus ,  mg po bid and steroid taper.

## 2022-08-23 NOTE — PROGRESS NOTE ADULT - ASSESSMENT
66M with h/o DM type II, HTN, CAD s/p CABG in 2020, Afib on Eliquis, CVA in 2019 due to Afib, Seizure d/o (last episode was on 4/8/22), h/o GI bleed in 2/2022 EGD with duodenal ulcer and ESRD on HD s/p R DDRT from DCD donor on 4/21/22 complicated by DGF requiring HD until 4/29/22 who presented with status epilepticus in setting of UTI/antibiotics and sub-therapeutic valproic acid level. Transferred to SICU on 7/25 with signs of sepsis, now with refractory seizures    Seizure Disorder:  - Neuro/Epilepsy Teams following. Avoid Fycompa 2/2 agitation in past  - 1 min seizure with clonic movements noted on EEG 8/16.   - Continue additional anti-seizure medication Briviact 50mg BID with additional 50mg post each HD session  - No further seizures noted.  EEG 8/16-8/18  - prior MRI head 6/27 with less concerns for PRES, likely ischemic changes  - o/n CTH 8/3 with no acute findings  - replete lytes aggressively      R DCD DDRT 4/21/22 c/b 2A ACR  - 2A ACR: s/p pulse steroids. Continue Prednisone 10mg QD  - Deferring right psoas muscle/fascia biopsy as wound is not worsening  - HD/UF as per nephrology for fluid overload/anuria, HD today   - 8/14 surveillance BCx negative  - graft doppler reviewed. Plan for repeat doppler on Thursday  - S/P removal of ureteral stent on 7/12  - Continue Doxazosin/Proscar for BPH  - OOB with RN/PT  - Renal Soft diet as tolerated with PO supplements  - 8/8 Punch biopsy wound Culture: ngtd. Negative for PTLD  - Continue Keflex for cellulitis (erythema around abd wound).   - DVT ppx with SQH  - fungitel negative  - LE studies WNL  - Benadryl PRN for sleep  - Daily CRP      Immunosuppression:   - Belatacept: 6/23-8/1 discontinued  - on Sirolimus based on level, Cellcept 250 BID, Pred 20mg QD  - PPX:  Nystatin, bactrim, valcyte (MWF)    DM  - BG improved on Lantus 8U. Continue pre-meal 9U and ISS.   - Fingerstick ac and qhs  - Endo following    AFib/R IJ DVT   - Eliquis with ~40% less efficacy while on fosphenytoin.    - Lovenox held for ANA and transaminitis   - rate controlled  - will discuss A/C plans daily    HTN:  - Amlodipine/Doxazosin/Hydralazline  - off Coreg due to bradycardia

## 2022-08-23 NOTE — PROGRESS NOTE ADULT - TIME BILLING
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 No  DSA.   s/p pulse dose steroids. remains oliguric and dialysis dependent. Will repeat allograft sonogram  Last hemodialysis was on 8/19 with fluid removal  UF with 2 L fluid removal today  IS meds- on Sirolimus , target trough 4-6 ng/ml  mg po bid and prednisone. Has had Belatacept 3 weeks ago. Will hold off further dose  AMS , seizures - Noted recent medication changes. Improved mental status, communicative   DM -  on Lantus and lispro. Adjusted for optimized glycemic control, monitor PO intake  HTN- Suboptimal control, adjusted hydralazine dose on 8/21, hemodialysis with fluid removal today  Cellulitis/ abdominal wall edema over RLQ - Was on Vancomycin . CT A/P on 8/6 revealed a retroperitoneal hematoma.  Skin bx done on 8/8/22 noted findings. ID follow up noted.   Noted ultrasound done on 8/22/22   Will maintain on antibiotics,   Will repeat ultrasound   I have seen the patient and reviewed dialysis prescription . Dialysis access noted.  Dialysis prescription has been adjusted for optimized control of volume status, uremia and electrolytes. Management of additional metabolic abnormalities/anemia will continue to be addressed on follow up.  I was present during and reviewed clinical and lab data as well as assessment and plan as documented . Please contact if any additional questions with any change in clinical condition or on availability of any additional information or reports.  Plan of care d/w house staff and transplant team.

## 2022-08-23 NOTE — PROGRESS NOTE ADULT - SUBJECTIVE AND OBJECTIVE BOX
[facility]   MD NINA MASON MD RUORU WONG, PA    TEL:  FROM 9 AM to 5 PM ---OFFICE: [phone]    FROM 5 PM - 9 AM PLEASE CALL ANSWERING SERVICE: [phone]    RENAL FOLLOW UP NOTE--Date of Service 08-23-22 @ 09:51  --------------------------------------------------------------------------------  HPI:      Pt seen and examined at bedside.   Marli SOB, chest pain     PAST HISTORY  --------------------------------------------------------------------------------  No significant changes to PMH, PSH, FHx, SHx, unless otherwise noted    ALLERGIES & MEDICATIONS  --------------------------------------------------------------------------------  Allergies    No Known Allergies    Intolerances      Standing Inpatient Medications  amLODIPine   Tablet 10 milliGRAM(s) Oral daily  brivaracetam 50 milliGRAM(s) Oral two times a day  cephalexin 500 milliGRAM(s) Oral every 12 hours  chlorhexidine 2% Cloths 1 Application(s) Topical <User Schedule>  doxazosin 4 milliGRAM(s) Oral <User Schedule>  epoetin cortney-epbx (RETACRIT) Injectable 90400 Unit(s) SubCutaneous <User Schedule>  finasteride 5 milliGRAM(s) Oral daily  heparin   Injectable 5000 Unit(s) SubCutaneous every 12 hours  hydrALAZINE 75 milliGRAM(s) Oral three times a day  insulin glargine Injectable (LANTUS) 8 Unit(s) SubCutaneous at bedtime  insulin lispro (ADMELOG) corrective regimen sliding scale   SubCutaneous three times a day before meals  insulin lispro (ADMELOG) corrective regimen sliding scale   SubCutaneous at bedtime  insulin lispro Injectable (ADMELOG) 9 Unit(s) SubCutaneous three times a day before meals  levothyroxine 50 MICROGram(s) Oral daily  melatonin 6 milliGRAM(s) Oral at bedtime  mycophenolate mofetil 250 milliGRAM(s) Oral <User Schedule>  nystatin    Suspension 574116 Unit(s) Oral four times a day  pantoprazole    Tablet 40 milliGRAM(s) Oral <User Schedule>  phenytoin   Capsule 200 milliGRAM(s) Oral two times a day  polyethylene glycol 3350 17 Gram(s) Oral daily  predniSONE   Tablet 10 milliGRAM(s) Oral daily  senna 2 Tablet(s) Oral at bedtime  sirolimus 6 milliGRAM(s) Oral <User Schedule>  trimethoprim   80 mG/sulfamethoxazole 400 mG 1 Tablet(s) Oral daily  valGANciclovir 450 milliGRAM(s) Oral <User Schedule>    PRN Inpatient Medications  acetaminophen     Tablet .. 650 milliGRAM(s) Oral every 6 hours PRN  diphenhydrAMINE 25 milliGRAM(s) Oral at bedtime PRN      REVIEW OF SYSTEMS  --------------------------------------------------------------------------------  General: no fever  CVS: no chest pain  RESP: no sob, no cough  MSK: no edema     VITALS/PHYSICAL EXAM  --------------------------------------------------------------------------------  T(C): 36.4 (08-23-22 @ 09:01), Max: 36.9 (08-23-22 @ 01:00)  HR: 78 (08-23-22 @ 09:01) (70 - 85)  BP: 167/75 (08-23-22 @ 09:01) (134/60 - 167/75)  RR: 20 (08-23-22 @ 09:01) (18 - 20)  SpO2: 95% (08-23-22 @ 09:01) (94% - 99%)  Wt(kg): --        08-22-22 @ 07:01  -  08-23-22 @ 07:00  --------------------------------------------------------  IN: 650 mL / OUT: 2000 mL / NET: -1350 mL      Physical Exam:  	Gen: NAD  	HEENT: MMM  	Pulm: CTA B/L  	CV: S1S2  	Abd: Soft, +BS  	Ext: No LE edema B/L                      Neuro: Awake   	Skin: Warm and Dry   	Vascular access:  HD catheter            no  garrett  LABS/STUDIES  --------------------------------------------------------------------------------              8.2    7.59  >-----------<  482      [08-23-22 @ 07:02]              25.6     138  |  96  |  62  ----------------------------<  158      [08-23-22 @ 06:47]  3.8   |  23  |  3.88        Ca     9.0     [08-23-22 @ 06:47]      Mg     2.0     [08-23-22 @ 06:47]      Phos  3.4     [08-23-22 @ 06:47]    TPro  6.4  /  Alb  2.6  /  TBili  0.3  /  DBili  0.2  /  AST  21  /  ALT  23  /  AlkPhos  332  [08-23-22 @ 06:47]      PTT: 30.4       [08-23-22 @ 07:01]      Creatinine Trend:  SCr 3.88 [08-23 @ 06:47]  SCr 5.79 [08-22 @ 06:56]  SCr 4.65 [08-21 @ 06:44]  SCr 3.59 [08-20 @ 07:12]  SCr 4.80 [08-19 @ 07:29]    Urinalysis - [08-16-22 @ 18:49]      Color Yellow / Appearance Slightly Turbid / SG 1.018 / pH 8.5      Gluc 200 mg/dL / Ketone Negative  / Bili Negative / Urobili Negative       Blood Large / Protein >600 / Leuk Est Moderate / Nitrite Positive      RBC 13 / WBC 40 / Hyaline 7 / Gran  / Sq Epi  / Non Sq Epi 4 / Bacteria Moderate      Iron 17, TIBC 171, %sat 10      [05-02-22 @ 13:03]  Ferritin 6336      [07-27-22 @ 13:00]  PTH -- (Ca 9.2)      [02-05-22 @ 10:13]   294  HbA1c 6.0      [02-21-20 @ 08:53]  TSH 4.69      [07-06-22 @ 18:36]  Lipid: chol --, , HDL --, LDL --      [07-27-22 @ 13:00]    HBsAg Nonreact      [07-25-22 @ 11:51]  HCV 0.18, Nonreact      [07-25-22 @ 11:51]

## 2022-08-23 NOTE — PROGRESS NOTE ADULT - SUBJECTIVE AND OBJECTIVE BOX
Transplant Surgery - Multidisciplinary Progress Note  --------------------------------------------------------------  DDRT     4/21/2022            Present:  Patient seen with multidisciplinary team including Transplant Surgeon: Dr. Davis, Nephrologist: Dr. Alfred, PGY 3 Dr. Ng, PGY 3 Dr. Gimenez, nephrology fellow, and unit RN during am rounds. Disciplines not in attendance will be notified of the plan.      HPI: 66M with PMH: DM type II, HTN, CAD s/p CABG in 2020, AFib on Eliquis, CVA in 2019 due to Afib, Seizure d/o following CVA, last episode was on 4/8/22, h/o GIB in 2/2022 EGD with duodenal ulcer.  ESRD due to DM was on HD since 2019.     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  Donor was 58 , KDPI 82%, DCD, single vessels and ureter, HLA mismatch 1, 2, 2. No DSA, cPRA 0%. 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 Re-admitted in May for anemia in the setting of large perinephric hematoma s/p evacuation and repair of arterial anastomosis on 5/2/22. Intra operative biopsy showed no rejection, Creatinine ranging ~2mg/dL.    In Rehab:  He was recently dx with klebsiella UTI with Ertapenem - then switched to Levaquin    He also had parainfluenza pneumonia. Was at rehab progressing well.      Hospital course:  - 6/10: transferred from rehab facility for seizure status epilepticus. Intubated for respiratory protection and transferred to MICU.  EEG showed no seizure activity. Neuro consulted.   - 6/11: Extubated. Found to have R pleural effusion. s/p Thoracentesis 1.3L. Eliquis changed to heparin drip.  Post procedure drop in H&H. 5u PRBC, 2 u FFP.   - 6/11 evening: Transferred to SICU from MICU for further care in setting of hypoxia, significant R pleural effusion, anemia and hemodynamic instability  - 6/11 Intubated at 9pm and sedated on Precedex.  CT placed, 600ml blood drained.  1L total overnight, started pressors  - 6/11 Received Protamine for PTT >100 (was on Heparin drip started for AF), 2 u FFP and 5u PRBC total  - 6/13 s/p VATS 2.2L old blood removed; second chest tube placed  - 6/18-19: chest tubes removed  - 6/21: ?PRES vs. chronic ischemic changes on MRI, off Envarsus, switched to Belatacept 6/23 with good graft function  - 6/25: Tx to SICU for refractory seizures, improved with IV antiepileptic regimen  - 7/10: Downgraded from SICU to floor.   - 7/11: OR-->URETERAL STENT REMOVED  - 7/15: ESBL Kleb UTI (50-90K), completed Ertapenem x 3 Days  - 7/25: Tx to SICU for Sepsis/ elevated LFTS  - 7/27: Shiley placed for HD  - 7/28: ongoing fevers -> started Ertapenem/Fluc  - 7/29: HD restarted + 1U PRBC.  IR bx with 2A rejection, started pulse steroids. LFTs have improved  - 8/4: refractory seizures  - 8/8: s/p derm bx with no acute finding  - 8/8: s/p permacath exchange  - 8/16: One seizure on EEG medications changed added brivaracetam  - 8/22: HD, followed by U/S of Transplant kidney    Interval events:   - no acute overnight events  - AOX3 this am  - induration around wound unchanged since yesterday. Off Vanco since 8/18.  - Started Keflex on 8/20 for poss wound cellulitis.     Immunosuppression  Maintenance: Sirolimus aper level,  Cellcept to 250 BID, Pred 10mg po qd   Ongoing monitoring for signs of rejection.    Potential Discharge date: pending clinical improvement.   Education:  Medications  Plan of care:  See Below      MEDICATIONS  (STANDING):  amLODIPine   Tablet 10 milliGRAM(s) Oral daily  brivaracetam 50 milliGRAM(s) Oral two times a day  cephalexin 500 milliGRAM(s) Oral every 12 hours  chlorhexidine 2% Cloths 1 Application(s) Topical <User Schedule>  doxazosin 4 milliGRAM(s) Oral <User Schedule>  epoetin cortney-epbx (RETACRIT) Injectable 06480 Unit(s) SubCutaneous <User Schedule>  finasteride 5 milliGRAM(s) Oral daily  heparin   Injectable 5000 Unit(s) SubCutaneous every 12 hours  hydrALAZINE 75 milliGRAM(s) Oral three times a day  insulin glargine Injectable (LANTUS) 8 Unit(s) SubCutaneous at bedtime  insulin lispro (ADMELOG) corrective regimen sliding scale   SubCutaneous three times a day before meals  insulin lispro (ADMELOG) corrective regimen sliding scale   SubCutaneous at bedtime  insulin lispro Injectable (ADMELOG) 9 Unit(s) SubCutaneous three times a day before meals  levothyroxine 50 MICROGram(s) Oral daily  melatonin 6 milliGRAM(s) Oral at bedtime  mycophenolate mofetil 250 milliGRAM(s) Oral <User Schedule>  nystatin    Suspension 439238 Unit(s) Oral four times a day  pantoprazole    Tablet 40 milliGRAM(s) Oral <User Schedule>  phenytoin   Capsule 200 milliGRAM(s) Oral two times a day  polyethylene glycol 3350 17 Gram(s) Oral daily  predniSONE   Tablet 10 milliGRAM(s) Oral daily  senna 2 Tablet(s) Oral at bedtime  sirolimus 6 milliGRAM(s) Oral <User Schedule>  trimethoprim   80 mG/sulfamethoxazole 400 mG 1 Tablet(s) Oral daily  valGANciclovir 450 milliGRAM(s) Oral <User Schedule>    MEDICATIONS  (PRN):  acetaminophen     Tablet .. 650 milliGRAM(s) Oral every 6 hours PRN Mild Pain (1 - 3)  diphenhydrAMINE 25 milliGRAM(s) Oral at bedtime PRN Insomnia      PAST MEDICAL & SURGICAL HISTORY:  Hypertension      Diabetes      Dyslipidemia      CAD (Coronary Artery Disease)  with Stents in 06/2009 and 6/2019, s/p off-pump C3L on 8/13/20      Hypothyroidism      CVA (cerebral vascular accident)  12/13/19 with residual bilateral weakness      Anemia      PEG (percutaneous endoscopic gastrostomy) status  removed July 2020      Intubation of airway performed without difficulty  Dec 2019  CVA c/b status epilepticus requiring intubation and PEG in 12/2019-      ESRD on dialysis  M/W/F      Seizures  after CVA, last seizure was last week 4/07/22      History of insertion of stent into coronary artery bypass graft  2009 and 2019      S/P CABG x 3  off pump C3L on 8/13/20          Vital Signs Last 24 Hrs  T(C): 36.4 (23 Aug 2022 09:01), Max: 36.9 (23 Aug 2022 01:00)  T(F): 97.6 (23 Aug 2022 09:01), Max: 98.5 (23 Aug 2022 01:00)  HR: 78 (23 Aug 2022 09:01) (70 - 85)  BP: 167/75 (23 Aug 2022 09:01) (134/60 - 167/75)  BP(mean): --  RR: 20 (23 Aug 2022 09:01) (18 - 20)  SpO2: 95% (23 Aug 2022 09:01) (94% - 99%)    Parameters below as of 23 Aug 2022 09:01  Patient On (Oxygen Delivery Method): room air        I&O's Summary    22 Aug 2022 07:01  -  23 Aug 2022 07:00  --------------------------------------------------------  IN: 650 mL / OUT: 2000 mL / NET: -1350 mL                              8.2    7.59  )-----------( 482      ( 23 Aug 2022 07:02 )             25.6     08-23    138  |  96  |  62<H>  ----------------------------<  158<H>  3.8   |  23  |  3.88<H>    Ca    9.0      23 Aug 2022 06:47  Phos  3.4     08-23  Mg     2.0     08-23    TPro  6.4  /  Alb  2.6<L>  /  TBili  0.3  /  DBili  0.2  /  AST  21  /  ALT  23  /  AlkPhos  332<H>  08-23                     REVIEW OF SYSTEMS  --------------------------------------------------------------------------------  unable to obtain full accurate ROS, but denies CP, SOB, HA, dizziness, N/V/D/C, abd discomfort.     PHYSICAL EXAM:  Constitutional: awake, weak  Eyes: Anicteric  ENMT: nc/at  Respiratory: not tachypneic, non-labored on RA    Cardiovascular: normotensive, regular rate on monitor   Gastrointestinal: Soft, non distended, nontender, RLQ incisional/groin ecchymotic and left flunk erythem  Genitourinary: anuric on HD   Extremities: SCD's in place and working bilaterally, overall with improving edema  Vascular: Palpable dp pulses bilaterally  Neurological: AAOx3  Skin:  As noted above. No ulcerations   Musculoskeletal: Moving all extremities  Psychiatric: overall calm, following directions

## 2022-08-24 NOTE — CHART NOTE - NSCHARTNOTEFT_GEN_A_CORE
Patient is a 66  M with Type 2 DM> unknown control due to skewed A1C 6.6% secondary to chronic anemia and s/p blood tx. On basal/bolus insulin therapy PTA. DM c/b ESRD s/p DDRT on 3/21, CVA, CAD s/p CABG, Afib on apixaban, seizure activity, HTN, HLD, h/o GI bleed in 2/2022 EGD with duodenal ulcer, discharged to [facility] rehab center after prior hospitalization, now re-admitted from [facility] for seizures c/f status epilepticus in setting of UTI. Endocrine consulted for steroid induced hyperglycemia. Prolonged hospital course w/ ICU stay, previously intubated/extubated, previous seizures. S/p ureteral stent removal 7/12/22. Now tolerating POs with BG mostly at goal while on present insulin doses except for FBG <100s. Will preventively decrease basal insulin to keep BG goal (100-180mg/dl).     POCT Blood Glucose.: 120 mg/dL (08-24-22 @ 17:20)  POCT Blood Glucose.: 158 mg/dL (08-24-22 @ 12:35)  POCT Blood Glucose.: 90 mg/dL (08-24-22 @ 08:28)  POCT Blood Glucose.: 119 mg/dL (08-23-22 @ 21:18)  POCT Blood Glucose.: 153 mg/dL (08-23-22 @ 17:23)  POCT Blood Glucose.: 226 mg/dL (08-23-22 @ 11:55)  POCT Blood Glucose.: 152 mg/dL (08-23-22 @ 08:05)  POCT Blood Glucose.: 140 mg/dL (08-22-22 @ 21:18)    PLAN:   -test BG AC/HS  -Decrease Lantus dose to 7 units QHS  -c/w Admelog 9 units AC meals (hold if not eating)  -c/w Admelog low correction scale AC and low HS scale  -cons carb diet  -notify endocrine team if steroids changed as insulin regimen will need adjustment

## 2022-08-24 NOTE — PROGRESS NOTE ADULT - ASSESSMENT
66M with h/o DM type II, HTN, CAD s/p CABG in 2020, Afib on Eliquis, CVA in 2019 due to Afib, Seizure d/o (last episode was on 4/8/22), h/o GI bleed in 2/2022 EGD with duodenal ulcer and ESRD on HD s/p R DDRT from DCD donor on 4/21/22 complicated by DGF requiring HD until 4/29/22 who presented with status epilepticus in setting of UTI/antibiotics and sub-therapeutic valproic acid level. Transferred to SICU on 7/25 with signs of sepsis, now with refractory seizures    Seizure Disorder:  - Neuro/Epilepsy Teams following. Avoid Fycompa 2/2 agitation in past  - 1 min seizure with clonic movements noted on EEG 8/16.   - Continue additional anti-seizure medication Briviact 50mg BID with additional 50mg post each HD session  - No further seizures noted.  EEG 8/16-8/18  - prior MRI head 6/27 with less concerns for PRES, likely ischemic changes  - o/n CTH 8/3 with no acute findings  - will follow up with neuro regarding seizure medications recs      R DCD DDRT 4/21/22 c/b 2A ACR  - 2A ACR: s/p pulse steroids. Continue Prednisone 10mg QD  - Deferring right psoas muscle/fascia biopsy as wound is not worsening  - HD/UF as per nephrology for fluid overload/anuria, HD today   - 8/14 surveillance BCx negative  - graft doppler reviewed. Plan for repeat doppler on Thursday 8/25  - S/P removal of ureteral stent on 7/12  - Continue Doxazosin/Proscar for BPH  - OOB with RN/PT  - Renal Soft diet as tolerated with PO supplements  - 8/8 Punch biopsy wound Culture: ngtd. Negative for PTLD  - Continue Keflex for cellulitis (erythema around abd wound).   - DVT ppx with SQH  - fungitel negative  - LE studies WNL  - Benadryl PRN for sleep  - Daily CRP      Immunosuppression:   - Belatacept: 6/23-8/1 discontinued  - on Sirolimus based on level, Cellcept 250 BID, Pred 20mg QD  - PPX:  Nystatin, bactrim, valcyte (MWF)    DM  - BG improved on Lantus 8U. Continue pre-meal 9U and ISS.   - Fingerstick ac and qhs  - Endo following    AFib/R IJ DVT   - Eliquis with ~40% less efficacy while on fosphenytoin.    - Lovenox held for ANA and transaminitis   - rate controlled  - will discuss A/C plans daily    HTN:  - Amlodipine/Doxazosin/Hydralazline  - off Coreg due to bradycardia

## 2022-08-24 NOTE — CHART NOTE - NSCHARTNOTEFT_GEN_A_CORE
Neurology    Patient CHERELLE is a 67yo M PMHx CVA (2019) with subsequent sz disorder, ESRD s/p renal transplant (04/2022), afib (on apixaban), CAD (s/p stents), CABG (2020), HTN, HLD, DM with neurology following given concerns of status epilepticus. Semiology includes eyes deviated to the R, rhythmic R facial twitching as well as R shoulder clonic movements lasting approximately 30 seconds each. EEG from 04/2022 showed L frontocentral focal onset seizures. S/p intubation on 6/11. Was on keppra, valproic acid, vimpat but had seizures through the prior two medications. Stopped vimpat, valproic acid, standing ativan, and fycompa. Stopped fycompa as patient became agitated, aggressive. Was only on phenytoin until recalled on evening of 8/3 into AM of 8/4 for concern of seizure. Currently on phenytoin and briviact.      EEG 8/18  Clinical Impression:  Abnormal EEG study   Left hemispheric dysfunction with increased risk for seizures from the left posterior quadrant  Mild to moderate diffuse cerebral dysfunction, not specific as to etiology.  No seizures since 8/16/22 at 20:22PM    Impression: History of recent status epilepticus that was refractory, was stable on phenytoin. Post HD and hypertensive with concerns of systemic inflammatory process (elevated leukocytosis patient found with recurrent seizures. Mental status improved possibly from hospital-induced delirium with toxic-metabolic encephalopathy and infectious encephalopathy.     Recommendations:  [ ] Continue PO phenytoin 200mg BID. Will re-evaluate next week if consider to titrate down and potentially remove.  [ ] Continue briviact 50mg BID with additional 50mg after dialysis (please ensure this is added on days of dialysis)   [x] CSF without evidence of infection, inflammation  [x] Vitamin B12 1157, folate >20, homocysteine 13.4, Vitamin B1 160  [x] TSH 4.6, T3 3.9/T4 43 - correction of hypothyroidism per primary team and endocrinology  [x] BP goals of normotension   [x] MR brain without clear evidence of infection, inflammation, or acute CNS event (possibly minimal enhancement). Although prior study read as possible PRES, most recent MRI seems more consistent with significant chronic ischemic microvascular disease given no associated DWI changes or obvious enhancement  [x] Telemetry monitoring  [x] Neuro checks and vital signs per unit protocol  [x] Seizure/fall/aspiration precautions  [ ] please have adequate sleeping environment for the patient, light on, curtains open, TV on during the day with regular stimulation and out of bed to chair if possible. During the night, close curtains, TV off, lights off, and minimize interruptions (limit blood draws and vital sign checks if possible). Regular interaction with patient with staff (introducing selves), frequent reorientation, and encouragement of visitors as allowed by hospital and unit policy. Regular toileting.  [/] Advise against driving, operating heavy machinery, water sports/bathing, activity in elevation without appropriate safety precautions  [ ] outpatient EMG/ NCS    Patient case discussed w/ Dr Sargent

## 2022-08-24 NOTE — PROGRESS NOTE ADULT - TIME BILLING
DDRT from DCD donor on 4/21/22  with Grade 2a rejection (biopsy on 7/29)  some glomerulitis and very minimal peritubular capillaritis, but his C4d is negative. No  DSA.   s/p pulse dose steroids. remains oliguric and dialysis dependent. Will repeat allograft sonogram Thursday  IS meds- on Sirolimus , target trough 4-6 ng/ml  mg po bid and prednisone. Has had Belatacept 3 weeks ago. Will hold off further dose  AMS , seizures - Noted recent medication changes. Improved mental status, communicative   DM -  on Lantus and lispro. Adjusted for optimized glycemic control, monitor PO intake  HTN- Improved control, adjusted hydralazine dose on 8/21, hemodialysis with fluid removal today  Cellulitis/ abdominal wall edema over RLQ - Was on Vancomycin . CT A/P on 8/6 revealed a retroperitoneal hematoma.  Skin bx done on 8/8/22 noted findings. ID follow up noted.   Noted ultrasound done on 8/22/22 , will repeat on 8/25/22  Physical therapy  I have seen the patient and reviewed dialysis prescription . Dialysis access noted.  Dialysis prescription has been adjusted for optimized control of volume status, uremia and electrolytes. Management of additional metabolic abnormalities/anemia will continue to be addressed on follow up.  I was present during and reviewed clinical and lab data as well as assessment and plan as documented . Please contact if any additional questions with any change in clinical condition or on availability of any additional information or reports.  Plan of care d/w house staff and transplant team.

## 2022-08-24 NOTE — PROGRESS NOTE ADULT - PROBLEM SELECTOR PLAN 5
Pt with B/L LE/UE swelling R>L, doppler studies negative of DVT. Continue Subq Heparin. Cannot use Eliquis given interaction with Dilantin. Edema greatly improving with fluid removal. Last HD done on 8/22/22. HD plan as outline above

## 2022-08-24 NOTE — CONSULT NOTE ADULT - CONSULT REQUESTED DATE/TIME
11-Jun-2022 21:22
08-Aug-2022 17:26
11-Jun-2022 13:15
13-Jul-2022 18:51
23-Aug-2022 17:38
24-Aug-2022 10:27
25-Jul-2022 14:52
11-Jun-2022 14:05
15-Aug-2022 12:40
18-Jun-2022 13:04
10-Raffi-2022 16:34
14-Jun-2022 14:16
21-Jul-2022 11:07
11-Jun-2022 20:24
20-Jun-2022 15:13
29-Jul-2022 09:58
10-Raffi-2022 17:34
12-Aug-2022 17:43
13-Jul-2022 23:00
07-Aug-2022 08:08
27-Jul-2022 11:41

## 2022-08-24 NOTE — PROGRESS NOTE ADULT - SUBJECTIVE AND OBJECTIVE BOX
[facility] DIVISION OF KIDNEY DISEASES AND HYPERTENSION -- FOLLOW UP NOTE  --------------------------------------------------------------------------------  Chief Complaint: 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 was seen and examined today. Overnight events noted.   Pt reports RUQ pain - intermittent. Denies N/V, fever, chills, diarrhea or constipation or dizziness. Last Hd done on 8/22/22 and UF done on 8/23/22.     PAST HISTORY  --------------------------------------------------------------------------------  No significant changes to PMH, PSH, FHx, SHx, unless otherwise noted    ALLERGIES & MEDICATIONS  --------------------------------------------------------------------------------  Allergies    No Known Allergies    Intolerances    Standing Inpatient Medications  amLODIPine   Tablet 10 milliGRAM(s) Oral daily  brivaracetam 50 milliGRAM(s) Oral two times a day  cephalexin 500 milliGRAM(s) Oral every 12 hours  chlorhexidine 2% Cloths 1 Application(s) Topical <User Schedule>  doxazosin 4 milliGRAM(s) Oral <User Schedule>  epoetin cortney-epbx (RETACRIT) Injectable 19885 Unit(s) SubCutaneous <User Schedule>  finasteride 5 milliGRAM(s) Oral daily  heparin   Injectable 5000 Unit(s) SubCutaneous every 12 hours  hydrALAZINE 75 milliGRAM(s) Oral three times a day  insulin glargine Injectable (LANTUS) 8 Unit(s) SubCutaneous at bedtime  insulin lispro (ADMELOG) corrective regimen sliding scale   SubCutaneous three times a day before meals  insulin lispro (ADMELOG) corrective regimen sliding scale   SubCutaneous at bedtime  insulin lispro Injectable (ADMELOG) 9 Unit(s) SubCutaneous three times a day before meals  levothyroxine 50 MICROGram(s) Oral daily  melatonin 6 milliGRAM(s) Oral at bedtime  mycophenolate mofetil 250 milliGRAM(s) Oral <User Schedule>  nystatin    Suspension 744818 Unit(s) Oral four times a day  pantoprazole    Tablet 40 milliGRAM(s) Oral <User Schedule>  phenytoin   Capsule 200 milliGRAM(s) Oral two times a day  polyethylene glycol 3350 17 Gram(s) Oral daily  predniSONE   Tablet 10 milliGRAM(s) Oral daily  senna 2 Tablet(s) Oral at bedtime  sirolimus 6 milliGRAM(s) Oral <User Schedule>  trimethoprim   80 mG/sulfamethoxazole 400 mG 1 Tablet(s) Oral daily  valGANciclovir 450 milliGRAM(s) Oral <User Schedule>    PRN Inpatient Medications  acetaminophen     Tablet .. 650 milliGRAM(s) Oral every 6 hours PRN  diphenhydrAMINE 25 milliGRAM(s) Oral at bedtime PRN    REVIEW OF SYSTEMS  --------------------------------------------------------------------------------  Gen: No fevers/chills  Respiratory: No dyspnea, cough,   CV: No chest pain,   GI: See HPI,  Transplant: No pain  : No increased frequency,  MSK: No edema  Neuro: No dizziness/lightheadedness    All other systems were reviewed and are negative, except as noted.    VITALS/PHYSICAL EXAM  --------------------------------------------------------------------------------  T(C): 37 (08-24-22 @ 13:31), Max: 37 (08-24-22 @ 13:31)  HR: 77 (08-24-22 @ 13:31) (71 - 89)  BP: 143/66 (08-24-22 @ 13:31) (143/66 - 172/69)  RR: 20 (08-24-22 @ 13:31) (18 - 20)  SpO2: 99% (08-24-22 @ 13:31) (95% - 99%)  Wt(kg): --    08-23-22 @ 07:01  -  08-24-22 @ 07:00  --------------------------------------------------------  IN: 1800 mL / OUT: 2750 mL / NET: -950 mL    Physical Exam:  	Gen: NAD, able to speak in full sentences   	HEENT: PERRL, MMM   	Pulm: CTA B/L, no crackles   	CV: RRR, S1S2+  	Abd: +BS, soft, RLQ with erythema and induration overlying the renal transplant site with extension into the groin.               Transplant: No tenderness, swelling  	: No suprapubic tenderness  	MSK: no edema   	Psych: Normal affect and mood  	Skin: Warm              Access: Left IJ tunneled HD catheter +    LABS/STUDIES  --------------------------------------------------------------------------------              8.6    8.81  >-----------<  514      [08-24-22 @ 06:45]              27.4     134  |  93  |  84  ----------------------------<  90      [08-24-22 @ 06:46]  4.2   |  23  |  4.84        Ca     9.0     [08-24-22 @ 06:46]      Mg     2.0     [08-24-22 @ 06:46]      Phos  4.4     [08-24-22 @ 06:46]    TPro  6.7  /  Alb  3.1  /  TBili  0.3  /  DBili  0.2  /  AST  17  /  ALT  22  /  AlkPhos  343  [08-24-22 @ 06:46]    PTT: 31.6       [08-24-22 @ 06:46]    Creatinine Trend:  SCr 4.84 [08-24 @ 06:46]  SCr 3.88 [08-23 @ 06:47]  SCr 5.79 [08-22 @ 06:56]  SCr 4.65 [08-21 @ 06:44]  SCr 3.59 [08-20 @ 07:12]    Sirolimus (Rapamycin) Level, Serum: 5.4 ng/mL (08-24 @ 06:46)  Sirolimus (Rapamycin) Level, Serum: 4.1 ng/mL (08-23 @ 07:01)  Sirolimus (Rapamycin) Level, Serum: 4.7 ng/mL (08-22 @ 06:53)  Sirolimus (Rapamycin) Level, Serum: 4.1 ng/mL (08-21 @ 06:44)    Urinalysis - [08-16-22 @ 18:49]      Color Yellow / Appearance Slightly Turbid / SG 1.018 / pH 8.5      Gluc 200 mg/dL / Ketone Negative  / Bili Negative / Urobili Negative       Blood Large / Protein >600 / Leuk Est Moderate / Nitrite Positive      RBC 13 / WBC 40 / Hyaline 7 / Gran  / Sq Epi  / Non Sq Epi 4 / Bacteria Moderate   [facility] DIVISION OF KIDNEY DISEASES AND HYPERTENSION -- FOLLOW UP NOTE  --------------------------------------------------------------------------------  Chief Complaint: 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 was seen and examined today. Overnight events noted.   Pt reports RUQ pain - intermittent. Denies N/V, fever, chills, diarrhea or constipation or dizziness. Last Hd done on 8/22/22 and UF done on 8/23/22.     PAST HISTORY  --------------------------------------------------------------------------------  No significant changes to PMH, PSH, FHx, SHx, unless otherwise noted    ALLERGIES & MEDICATIONS  --------------------------------------------------------------------------------  Allergies    No Known Allergies    Intolerances    Standing Inpatient Medications  amLODIPine   Tablet 10 milliGRAM(s) Oral daily  brivaracetam 50 milliGRAM(s) Oral two times a day  cephalexin 500 milliGRAM(s) Oral every 12 hours  chlorhexidine 2% Cloths 1 Application(s) Topical <User Schedule>  doxazosin 4 milliGRAM(s) Oral <User Schedule>  epoetin cortney-epbx (RETACRIT) Injectable 21786 Unit(s) SubCutaneous <User Schedule>  finasteride 5 milliGRAM(s) Oral daily  heparin   Injectable 5000 Unit(s) SubCutaneous every 12 hours  hydrALAZINE 75 milliGRAM(s) Oral three times a day  insulin glargine Injectable (LANTUS) 8 Unit(s) SubCutaneous at bedtime  insulin lispro (ADMELOG) corrective regimen sliding scale   SubCutaneous three times a day before meals  insulin lispro (ADMELOG) corrective regimen sliding scale   SubCutaneous at bedtime  insulin lispro Injectable (ADMELOG) 9 Unit(s) SubCutaneous three times a day before meals  levothyroxine 50 MICROGram(s) Oral daily  melatonin 6 milliGRAM(s) Oral at bedtime  mycophenolate mofetil 250 milliGRAM(s) Oral <User Schedule>  nystatin    Suspension 832984 Unit(s) Oral four times a day  pantoprazole    Tablet 40 milliGRAM(s) Oral <User Schedule>  phenytoin   Capsule 200 milliGRAM(s) Oral two times a day  polyethylene glycol 3350 17 Gram(s) Oral daily  predniSONE   Tablet 10 milliGRAM(s) Oral daily  senna 2 Tablet(s) Oral at bedtime  sirolimus 6 milliGRAM(s) Oral <User Schedule>  trimethoprim   80 mG/sulfamethoxazole 400 mG 1 Tablet(s) Oral daily  valGANciclovir 450 milliGRAM(s) Oral <User Schedule>    PRN Inpatient Medications  acetaminophen     Tablet .. 650 milliGRAM(s) Oral every 6 hours PRN  diphenhydrAMINE 25 milliGRAM(s) Oral at bedtime PRN    REVIEW OF SYSTEMS  --------------------------------------------------------------------------------  Gen: No fevers/chills  Respiratory: No dyspnea, cough,   CV: No chest pain,   GI: See HPI,  Transplant: No pain  : No increased frequency,  MSK: No edema  Neuro: No dizziness/lightheadedness    All other systems were reviewed and are negative, except as noted.    VITALS/PHYSICAL EXAM  --------------------------------------------------------------------------------  T(C): 37 (08-24-22 @ 13:31), Max: 37 (08-24-22 @ 13:31)  HR: 77 (08-24-22 @ 13:31) (71 - 89)  BP: 143/66 (08-24-22 @ 13:31) (143/66 - 172/69)  RR: 20 (08-24-22 @ 13:31) (18 - 20)  SpO2: 99% (08-24-22 @ 13:31) (95% - 99%)  Wt(kg): --    08-23-22 @ 07:01  -  08-24-22 @ 07:00  --------------------------------------------------------  IN: 1800 mL / OUT: 2750 mL / NET: -950 mL    Physical Exam:  	Gen: NAD, able to speak in full sentences   	HEENT: PERRL, MMM   	Pulm: CTA B/L, no crackles   	CV: RRR, S1S2+  	Abd: +BS, soft, RLQ with erythema and induration overlying the renal transplant site with extension into the groin.               Transplant: No tenderness, swelling  	: No suprapubic tenderness  	MSK: no edema   	Psych: Normal affect and mood  	Skin: Warm              Access: Left IJ HD catheter +    LABS/STUDIES  --------------------------------------------------------------------------------              8.6    8.81  >-----------<  514      [08-24-22 @ 06:45]              27.4     134  |  93  |  84  ----------------------------<  90      [08-24-22 @ 06:46]  4.2   |  23  |  4.84        Ca     9.0     [08-24-22 @ 06:46]      Mg     2.0     [08-24-22 @ 06:46]      Phos  4.4     [08-24-22 @ 06:46]    TPro  6.7  /  Alb  3.1  /  TBili  0.3  /  DBili  0.2  /  AST  17  /  ALT  22  /  AlkPhos  343  [08-24-22 @ 06:46]    PTT: 31.6       [08-24-22 @ 06:46]    Creatinine Trend:  SCr 4.84 [08-24 @ 06:46]  SCr 3.88 [08-23 @ 06:47]  SCr 5.79 [08-22 @ 06:56]  SCr 4.65 [08-21 @ 06:44]  SCr 3.59 [08-20 @ 07:12]    Sirolimus (Rapamycin) Level, Serum: 5.4 ng/mL (08-24 @ 06:46)  Sirolimus (Rapamycin) Level, Serum: 4.1 ng/mL (08-23 @ 07:01)  Sirolimus (Rapamycin) Level, Serum: 4.7 ng/mL (08-22 @ 06:53)  Sirolimus (Rapamycin) Level, Serum: 4.1 ng/mL (08-21 @ 06:44)    Urinalysis - [08-16-22 @ 18:49]      Color Yellow / Appearance Slightly Turbid / SG 1.018 / pH 8.5      Gluc 200 mg/dL / Ketone Negative  / Bili Negative / Urobili Negative       Blood Large / Protein >600 / Leuk Est Moderate / Nitrite Positive      RBC 13 / WBC 40 / Hyaline 7 / Gran  / Sq Epi  / Non Sq Epi 4 / Bacteria Moderate

## 2022-08-24 NOTE — PROGRESS NOTE ADULT - SUBJECTIVE AND OBJECTIVE BOX
[facility] [place]   MD NINA MASON MD, PA KRISTINE SOLTANPOUR, DO INJUNG KO, NP    TEL:  OFFICE: [phone]    From 5pm-7am Answering Service [phone]    -- RENAL FOLLOW UP NOTE ---Date of Service 08-24-22 @ 14:23    Patient is a 66y old  Male who presents with a chief complaint of Status Epilepticus (24 Aug 2022 10:17)      Patient seen and examined at bedside. No chest pain/sob    VITALS:  T(F): 98.6 (08-24-22 @ 13:31), Max: 98.6 (08-24-22 @ 13:31)  HR: 77 (08-24-22 @ 13:31)  BP: 143/66 (08-24-22 @ 13:31)  RR: 20 (08-24-22 @ 13:31)  SpO2: 99% (08-24-22 @ 13:31)  Wt(kg): --    08-23 @ 07:01  -  08-24 @ 07:00  --------------------------------------------------------  IN: 1800 mL / OUT: 2750 mL / NET: -950 mL          PHYSICAL EXAM:  Constitutional: NAD  Neck: No JVD  Respiratory: CTAB, no wheezes, rales or rhonchi  Cardiovascular: S1, S2, RRR  Gastrointestinal: BS+, soft, NT/ND  Extremities: No peripheral edema    Hospital Medications:   MEDICATIONS  (STANDING):  amLODIPine   Tablet 10 milliGRAM(s) Oral daily  brivaracetam 50 milliGRAM(s) Oral two times a day  cephalexin 500 milliGRAM(s) Oral every 12 hours  chlorhexidine 2% Cloths 1 Application(s) Topical <User Schedule>  doxazosin 4 milliGRAM(s) Oral <User Schedule>  epoetin cortney-epbx (RETACRIT) Injectable 07198 Unit(s) SubCutaneous <User Schedule>  finasteride 5 milliGRAM(s) Oral daily  heparin   Injectable 5000 Unit(s) SubCutaneous every 12 hours  hydrALAZINE 75 milliGRAM(s) Oral three times a day  insulin glargine Injectable (LANTUS) 8 Unit(s) SubCutaneous at bedtime  insulin lispro (ADMELOG) corrective regimen sliding scale   SubCutaneous three times a day before meals  insulin lispro (ADMELOG) corrective regimen sliding scale   SubCutaneous at bedtime  insulin lispro Injectable (ADMELOG) 9 Unit(s) SubCutaneous three times a day before meals  levothyroxine 50 MICROGram(s) Oral daily  melatonin 6 milliGRAM(s) Oral at bedtime  mycophenolate mofetil 250 milliGRAM(s) Oral <User Schedule>  nystatin    Suspension 764715 Unit(s) Oral four times a day  pantoprazole    Tablet 40 milliGRAM(s) Oral <User Schedule>  phenytoin   Capsule 200 milliGRAM(s) Oral two times a day  polyethylene glycol 3350 17 Gram(s) Oral daily  predniSONE   Tablet 10 milliGRAM(s) Oral daily  senna 2 Tablet(s) Oral at bedtime  sirolimus 6 milliGRAM(s) Oral <User Schedule>  trimethoprim   80 mG/sulfamethoxazole 400 mG 1 Tablet(s) Oral daily  valGANciclovir 450 milliGRAM(s) Oral <User Schedule>      LABS:  08-24    134<L>  |  93<L>  |  84<H>  ----------------------------<  90  4.2   |  23  |  4.84<H>    Ca    9.0      24 Aug 2022 06:46  Phos  4.4     08-24  Mg     2.0     08-24    TPro  6.7  /  Alb  3.1<L>  /  TBili  0.3  /  DBili  0.2  /  AST  17  /  ALT  22  /  AlkPhos  343<H>  08-24    Creatinine Trend: 4.84 <--, 3.88 <--, 5.79 <--, 4.65 <--, 3.59 <--, 4.80 <--, 3.50 <--    Phosphorus Level, Serum: 4.4 mg/dL (08-24 @ 06:46)  Albumin, Serum: 3.1 g/dL (08-24 @ 06:46)                              8.6    8.81  )-----------( 514      ( 24 Aug 2022 06:45 )             27.4     Urine Studies:  Urinalysis - [08-16-22 @ 18:49]      Color Yellow / Appearance Slightly Turbid / SG 1.018 / pH 8.5      Gluc 200 mg/dL / Ketone Negative  / Bili Negative / Urobili Negative       Blood Large / Protein >600 / Leuk Est Moderate / Nitrite Positive      RBC 13 / WBC 40 / Hyaline 7 / Gran  / Sq Epi  / Non Sq Epi 4 / Bacteria Moderate      Iron 17, TIBC 171, %sat 10      [05-02-22 @ 13:03]  Ferritin 6336      [07-27-22 @ 13:00]  PTH -- (Ca 9.2)      [02-05-22 @ 10:13]   294  HbA1c 6.0      [02-21-20 @ 08:53]  TSH 4.69      [07-06-22 @ 18:36]  Lipid: chol --, , HDL --, LDL --      [07-27-22 @ 13:00]        RADIOLOGY & ADDITIONAL STUDIES:

## 2022-08-24 NOTE — PROGRESS NOTE ADULT - PROBLEM SELECTOR PLAN 1
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 No DSA.   s/p pulse dose steroids. Thymo was not given to treat the rejection as he is too frail and at increased risk for infections. Now with failed allograft function, Remains anuric and is dialysis dependent. Pt underwent IR guided HD catheter exchange on 8/8/22. Last HD done on 8/22/22. Received UF yesterday. Tolerated well. Labs reviewed. Plan for HD today. Recommend changing non tunnelled HD catheter to tunneled HD cath. Monitor for labs. Dose meds as per HD.

## 2022-08-24 NOTE — CONSULT NOTE ADULT - ASSESSMENT
66y M w/ ESRD s/p  donor renal transplant 22 c/b rejection. 14 F Left external jugular non-tunneled cath placed by IR on  (Right IJ/EJ and Left IJ were thrombosed) and last exchanged on . Last hemodialysis today .  IR consulted for exchange of shiley to permacath for hemodialysis.      Plan:  - Tentative plan for exchange to permacath for next   - Primary team to place IR procedure order under WALDO Holt  - COVID-19 test within 5 days of procedure  66y M w/ ESRD s/p  donor renal transplant 22 c/b rejection. 14 F Left external jugular non-tunneled cath placed by IR on  (Right IJ/EJ and Left IJ were thrombosed) and last exchanged on . Last hemodialysis today .  IR consulted for exchange of shiley to permacath for hemodialysis.      Plan:  - Tentative plan for exchange to permacat for next   - Primary team to place IR procedure order under WALDO Holt  - COVID-19 test within 5 days of procedure.  - Am CBC, BMP.   - Pt not a candidate for PICC since now a dialysis pt.   - D/w Transplant team.

## 2022-08-24 NOTE — CONSULT NOTE ADULT - CONSULT REASON
Rehabilitation consult
Muscle Biopsy
hemothorax
hypoxic resp failure, sz activity
Elevated liver enzymes
Kidney transplant recipient, management of immunosuppression
painful elongated toenails
redness
temporary HD catheter exchange for permacath
T2DM with hyper and hypo glycemia
muscle biopsy
recent renal transplant
hemothorax
sacral/bilateral buttocks skin changes
Leukocytosis
R PCN
Renal txp biopsy & evaluation for ureteral obstruction
renal failure
Seizures
Hoarseness.
exchange of left IJ non-tunneled HD catheter

## 2022-08-24 NOTE — CONSULT NOTE ADULT - CONSULT REQUESTED BY NAME
ED
Lizz Davis
MICU
Dr. Davis
Transplant Surgery
Lizz Davis
nephrology
Lizz Davis MD
Medicine
Naima Hopson
primary
Dr. Davis
HONEY
Medicine.
Ryan
RN
Transplant Surgery
medicine
primary
Dr. Tena
[place]

## 2022-08-24 NOTE — PROGRESS NOTE ADULT - SUBJECTIVE AND OBJECTIVE BOX
Transplant Surgery - Multidisciplinary Progress Note  --------------------------------------------------------------  DDRT     4/21/2022            Present:  Patient seen with multidisciplinary team including Transplant Nephrologist: Dr. Alfred, , WALDO Hopson, PGY 3 Dr. Gimenez, nephrology fellow, and unit RN during am rounds. Disciplines not in attendance will be notified of the plan.      HPI: 66M with PMH: DM type II, HTN, CAD s/p CABG in 2020, AFib on Eliquis, CVA in 2019 due to Afib, Seizure d/o following CVA, last episode was on 4/8/22, h/o GIB in 2/2022 EGD with duodenal ulcer.  ESRD due to DM was on HD since 2019.     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  Donor was 58 , KDPI 82%, DCD, single vessels and ureter, HLA mismatch 1, 2, 2. No DSA, cPRA 0%. 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 Re-admitted in May for anemia in the setting of large perinephric hematoma s/p evacuation and repair of arterial anastomosis on 5/2/22. Intra operative biopsy showed no rejection, Creatinine ranging ~2mg/dL.    In Rehab:  He was recently dx with klebsiella UTI with Ertapenem - then switched to Levaquin    He also had parainfluenza pneumonia. Was at rehab progressing well.      Hospital course:  - 6/10: transferred from rehab facility for seizure status epilepticus. Intubated for respiratory protection and transferred to MICU.  EEG showed no seizure activity. Neuro consulted.   - 6/11: Extubated. Found to have R pleural effusion. s/p Thoracentesis 1.3L. Eliquis changed to heparin drip.  Post procedure drop in H&H. 5u PRBC, 2 u FFP.   - 6/11 evening: Transferred to SICU from MICU for further care in setting of hypoxia, significant R pleural effusion, anemia and hemodynamic instability  - 6/11 Intubated at 9pm and sedated on Precedex.  CT placed, 600ml blood drained.  1L total overnight, started pressors  - 6/11 Received Protamine for PTT >100 (was on Heparin drip started for AF), 2 u FFP and 5u PRBC total  - 6/13 s/p VATS 2.2L old blood removed; second chest tube placed  - 6/18-19: chest tubes removed  - 6/21: ?PRES vs. chronic ischemic changes on MRI, off Envarsus, switched to Belatacept 6/23 with good graft function  - 6/25: Tx to SICU for refractory seizures, improved with IV antiepileptic regimen  - 7/10: Downgraded from SICU to floor.   - 7/11: OR-->URETERAL STENT REMOVED  - 7/15: ESBL Kleb UTI (50-90K), completed Ertapenem x 3 Days  - 7/25: Tx to SICU for Sepsis/ elevated LFTS  - 7/27: Shiley placed for HD  - 7/28: ongoing fevers -> started Ertapenem/Fluc  - 7/29: HD restarted + 1U PRBC.  IR bx with 2A rejection, started pulse steroids. LFTs have improved  - 8/4: refractory seizures  - 8/8: s/p derm bx with no acute finding  - 8/8: s/p permacath exchange  - 8/16: One seizure on EEG medications changed added brivaracetam  - 8/22: HD, followed by U/S of Transplant kidney    Interval events:   - no acute overnight events  - AOX3 this am  - getting HD    Plan of care:  See Below    MEDICATIONS  (STANDING):  amLODIPine   Tablet 10 milliGRAM(s) Oral daily  brivaracetam 50 milliGRAM(s) Oral two times a day  cephalexin 500 milliGRAM(s) Oral every 12 hours  chlorhexidine 2% Cloths 1 Application(s) Topical <User Schedule>  doxazosin 4 milliGRAM(s) Oral <User Schedule>  epoetin cortney-epbx (RETACRIT) Injectable 36022 Unit(s) SubCutaneous <User Schedule>  finasteride 5 milliGRAM(s) Oral daily  heparin   Injectable 5000 Unit(s) SubCutaneous every 12 hours  hydrALAZINE 75 milliGRAM(s) Oral three times a day  insulin glargine Injectable (LANTUS) 8 Unit(s) SubCutaneous at bedtime  insulin lispro (ADMELOG) corrective regimen sliding scale   SubCutaneous three times a day before meals  insulin lispro (ADMELOG) corrective regimen sliding scale   SubCutaneous at bedtime  insulin lispro Injectable (ADMELOG) 9 Unit(s) SubCutaneous three times a day before meals  levothyroxine 50 MICROGram(s) Oral daily  melatonin 6 milliGRAM(s) Oral at bedtime  mycophenolate mofetil 250 milliGRAM(s) Oral <User Schedule>  nystatin    Suspension 934229 Unit(s) Oral four times a day  pantoprazole    Tablet 40 milliGRAM(s) Oral <User Schedule>  phenytoin   Capsule 200 milliGRAM(s) Oral two times a day  polyethylene glycol 3350 17 Gram(s) Oral daily  predniSONE   Tablet 10 milliGRAM(s) Oral daily  senna 2 Tablet(s) Oral at bedtime  sirolimus 6 milliGRAM(s) Oral <User Schedule>  trimethoprim   80 mG/sulfamethoxazole 400 mG 1 Tablet(s) Oral daily  valGANciclovir 450 milliGRAM(s) Oral <User Schedule>    MEDICATIONS  (PRN):  acetaminophen     Tablet .. 650 milliGRAM(s) Oral every 6 hours PRN Mild Pain (1 - 3)  diphenhydrAMINE 25 milliGRAM(s) Oral at bedtime PRN Insomnia      PAST MEDICAL & SURGICAL HISTORY:  Hypertension  Diabetes  Dyslipidemia  CAD (Coronary Artery Disease)  with Stents in 06/2009 and 6/2019, s/p off-pump C3L on 8/13/20  Hypothyroidism  CVA (cerebral vascular accident)  12/13/19 with residual bilateral weakness  Anemia  PEG (percutaneous endoscopic gastrostomy) status  removed July 2020  Intubation of airway performed without difficulty  Dec 2019  CVA c/b status epilepticus requiring intubation and PEG in 12/2019-  ESRD on dialysis  M/W/F  Seizures  after CVA, last seizure was last week 4/07/22  History of insertion of stent into coronary artery bypass graft  2009 and 2019  S/P CABG x 3  off pump C3L on 8/13/20    Vital Signs Last 24 Hrs  T(C): 36.9 (24 Aug 2022 09:10), Max: 36.9 (23 Aug 2022 10:40)  T(F): 98.4 (24 Aug 2022 09:10), Max: 98.4 (23 Aug 2022 10:40)  HR: 89 (24 Aug 2022 09:10) (71 - 89)  BP: 152/72 (24 Aug 2022 09:10) (138/54 - 172/69)  BP(mean): 100 (23 Aug 2022 21:20) (94 - 100)  RR: 20 (24 Aug 2022 09:10) (18 - 20)  SpO2: 95% (24 Aug 2022 09:10) (92% - 98%)    Parameters below as of 24 Aug 2022 09:10  Patient On (Oxygen Delivery Method): room air    I&O's Summary    23 Aug 2022 07:01  -  24 Aug 2022 07:00  --------------------------------------------------------  IN: 1800 mL / OUT: 2750 mL / NET: -950 mL                          8.6    8.81  )-----------( 514      ( 24 Aug 2022 06:45 )             27.4     08-24    134<L>  |  93<L>  |  84<H>  ----------------------------<  90  4.2   |  23  |  4.84<H>    Ca    9.0      24 Aug 2022 06:46  Phos  4.4     08-24  Mg     2.0     08-24    TPro  6.7  /  Alb  3.1<L>  /  TBili  0.3  /  DBili  0.2  /  AST  17  /  ALT  22  /  AlkPhos  343<H>  08-24      REVIEW OF SYSTEMS  --------------------------------------------------------------------------------  unable to obtain full accurate ROS, but denies CP, SOB, HA, dizziness, N/V/D/C, abd discomfort.     PHYSICAL EXAM:  Constitutional: awake, weak  Eyes: Anicteric  ENMT: nc/at  Respiratory: not tachypneic, non-labored on RA    Cardiovascular: normotensive, regular rate on monitor   Gastrointestinal: Soft, non distended, nontender, RLQ incisional/groin ecchymotic and left flank erythema  Genitourinary: anuric on HD   Extremities: SCD's in place and working bilaterally, overall with improving edema  Vascular: Palpable dp pulses bilaterally  Neurological: AAOx3  Skin:  As noted above. No ulcerations   Musculoskeletal: Moving all extremities  Psychiatric: overall calm, following directions

## 2022-08-24 NOTE — PROGRESS NOTE ADULT - ASSESSMENT
66 yr old Male with PMHx ESRD s/p permacath removal 5/10/22 s/p Rt DDRT 4/21/22,  CVA (2019), Afib on apixaban, seizure activity, CAD s/p stents, CABG (2020), HTN, HLD, DM on insulin, gastric/duodenal ulcer, recent hospitalization 4/28/22 -5/10/22 with weakness/anemia found to have perinephric hematoma requiring evacuation and repair of bleeding arterial anastomosis. Curently being treated for UTI with Levaquin Today after developing multiple sz episodes refractory to 5 mg versed IM (EMS) and 2 mg ativan IV in E.D. concerning for status epilepticus requiring intubation for hypoxic respiratory  failure. MICU Consult was called for hypoxic respiratory failure secondary to status epilepticus.  Nephrology consulted for renal failure.     A/P  DCD KTX on 04/21/22  Course complicated by DGF, was on HD until 4/29/22. Readmitted in May for anemia in the setting of large perinephric hematoma s/p evacuation and repair of arterial anastomosis on 5/02/22. Intra operative biopsy showed no rejection.  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   Now with failed allograft function, Remains dialysis dependent.   continue Immunosuppression per transplant team  last HD 08/22/22, plan for HD today   transplant team on board   monitor BMP, U/O     Immunosuppression per transplant team  - Belatacept: 6/23-8/1/2022 discontinued  - on Sirolimus since 8/1/2022 based on level, Cellcept 250 BID, Prednisone 10 mg QD 8/16  - PPX:  Nystatin, bactrim, valcyte (MWF)        HTN   BP manage by transplant team    monitor BP closely

## 2022-08-24 NOTE — CONSULT NOTE ADULT - SUBJECTIVE AND OBJECTIVE BOX
Reason for Referral: permacath placement for hemodialysis     Clinical Summary:   66y M w/ ESRD s/p  donor renal transplant 22 c/b rejection. 14 F Left external jugular non-tunneled cath placed by IR on  (Right IJ/EJ and Left IJ were thrombosed) and last exchanged on . Last hemodialysis today .  IR consulted for exchange of shiley to permacath for hemodialysis.      Vitals:  T(F): 97.4 (22 @ 14:45), Max: 98.6 (22 @ 13:31)  HR: 70 (22 @ 14:45) (70 - 89)  BP: 140/64 (22 @ 14:45) (140/64 - 172/69)  RR: 18 (22 @ 14:45) (18 - 20)  SpO2: 100% (22 @ 14:45) (95% - 100%)    Labs:           8.6  8.81)-----(514     (22 @ 06:45)         27.4     134 | 93 | 84  --------------------< 90     (22 @ 06:46)  4.2 | 23 | 4.84       PT: -- 22 @ 06:46  aPTT: 31.6 22 @ 06:46   INR: -- 22 @ 06:46    Imaging: reviewed

## 2022-08-24 NOTE — CONSULT NOTE ADULT - REASON FOR ADMISSION
Status Epilepticus
Status Epilepticus.
Status Epilepticus

## 2022-08-25 NOTE — PROGRESS NOTE ADULT - PROBLEM SELECTOR PLAN 1
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 No DSA.   s/p pulse dose steroids. Thymo was not given to treat the rejection as he is too frail and at increased risk for infections. Now with failed allograft function, Remains anuric and is dialysis dependent. Pt underwent IR guided HD catheter exchange on 8/8/22. Last HD done on 8/24/22. Tolerated well. Labs reviewed. Pt. is still anuric. Plan for UF for 2 hrs today. Will transfuse 1 unit PRBC with UF today. IR notes regarding tunneled HD catheter placement reviewed from 8/24/22. Monitor for labs. Dose meds as per HD.

## 2022-08-25 NOTE — PROGRESS NOTE ADULT - ASSESSMENT
66M with h/o DM type II, HTN, CAD s/p CABG in 2020, Afib on Eliquis, CVA in 2019 due to Afib, Seizure d/o (last episode was on 4/8/22), h/o GI bleed in 2/2022 EGD with duodenal ulcer and ESRD on HD s/p R DDRT from DCD donor on 4/21/22 complicated by DGF requiring HD until 4/29/22 who presented with status epilepticus in setting of UTI/antibiotics and sub-therapeutic valproic acid level. Transferred to SICU on 7/25 with signs of sepsis, now with refractory seizures    Seizure Disorder:  - Neuro/Epilepsy Teams following. Avoid Fycompa 2/2 agitation in past  - 1 min seizure with clonic movements noted on EEG 8/16.   - Continue additional anti-seizure medication Briviact 50mg BID with additional 50mg post each HD session  - No further seizures noted.  EEG 8/16-8/18  - prior MRI head 6/27 with less concerns for PRES, likely ischemic changes  - o/n CTH 8/3 with no acute findings  - will follow up with neuro regarding seizure medications recs      R DCD DDRT 4/21/22 c/b 2A ACR  - 2A ACR: s/p pulse steroids. Continue Prednisone 10mg QD  - Deferring right psoas muscle/fascia biopsy as wound is not worsening  - HD/UF as per nephrology for fluid overload/anuria, HD today   - 8/14 surveillance BCx negative  - graft doppler reviewed. Plan for repeat doppler on Thursday 8/25  - S/P removal of ureteral stent on 7/12  - Continue Doxazosin/Proscar for BPH  - OOB with RN/PT  - Renal Soft diet as tolerated with PO supplements  - 8/8 Punch biopsy wound Culture: ngtd. Negative for PTLD  - Continue Keflex for cellulitis (erythema around abd wound).   - DVT ppx with SQH  - fungitel negative  - LE studies WNL  - Benadryl PRN for sleep  - Daily CRP      Immunosuppression:   - Belatacept: 6/23-8/1 discontinued  - on Sirolimus based on level, Cellcept 250 BID, Pred 20mg QD  - PPX:  Nystatin, bactrim, valcyte (MWF)    DM  - BG improved on Lantus 7U. Continue pre-meal 9U and ISS.   - Fingerstick ac and qhs  - Endo following    AFib/R IJ DVT   - Eliquis with ~40% less efficacy while on fosphenytoin.    - Lovenox held for ANA and transaminitis   - rate controlled  - will discuss A/C plans daily    HTN:  - Amlodipine/Doxazosin/Hydralazline  - off Coreg due to bradycardia

## 2022-08-25 NOTE — PROGRESS NOTE ADULT - SUBJECTIVE AND OBJECTIVE BOX
[facility] DIVISION OF KIDNEY DISEASES AND HYPERTENSION -- FOLLOW UP NOTE  --------------------------------------------------------------------------------  Chief Complaint: 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 was seen and examined today. Overnight events noted.   Pt reports RUQ pain - intermittent. Denies N/V, fever, chills, diarrhea or constipation or dizziness. Last Hd done on 8/24/22. Tolerated well.     PAST HISTORY  --------------------------------------------------------------------------------  No significant changes to PMH, PSH, FHx, SHx, unless otherwise noted    ALLERGIES & MEDICATIONS  --------------------------------------------------------------------------------  Allergies    No Known Allergies    Intolerances    Standing Inpatient Medications  amLODIPine   Tablet 10 milliGRAM(s) Oral daily  brivaracetam 50 milliGRAM(s) Oral two times a day  cephalexin 500 milliGRAM(s) Oral every 12 hours  chlorhexidine 2% Cloths 1 Application(s) Topical <User Schedule>  doxazosin 4 milliGRAM(s) Oral <User Schedule>  epoetin cortney-epbx (RETACRIT) Injectable 08085 Unit(s) SubCutaneous <User Schedule>  finasteride 5 milliGRAM(s) Oral daily  heparin   Injectable 5000 Unit(s) SubCutaneous every 12 hours  hydrALAZINE 75 milliGRAM(s) Oral three times a day  insulin glargine Injectable (LANTUS) 7 Unit(s) SubCutaneous at bedtime  insulin lispro (ADMELOG) corrective regimen sliding scale   SubCutaneous three times a day before meals  insulin lispro (ADMELOG) corrective regimen sliding scale   SubCutaneous at bedtime  insulin lispro Injectable (ADMELOG) 9 Unit(s) SubCutaneous three times a day before meals  levothyroxine 50 MICROGram(s) Oral daily  melatonin 6 milliGRAM(s) Oral at bedtime  nystatin    Suspension 045589 Unit(s) Oral four times a day  pantoprazole    Tablet 40 milliGRAM(s) Oral <User Schedule>  phenytoin   Capsule 200 milliGRAM(s) Oral two times a day  polyethylene glycol 3350 17 Gram(s) Oral daily  predniSONE   Tablet 10 milliGRAM(s) Oral daily  senna 2 Tablet(s) Oral at bedtime  sirolimus 6 milliGRAM(s) Oral <User Schedule>  trimethoprim   80 mG/sulfamethoxazole 400 mG 1 Tablet(s) Oral daily  valGANciclovir 450 milliGRAM(s) Oral <User Schedule>    PRN Inpatient Medications  acetaminophen     Tablet .. 650 milliGRAM(s) Oral every 6 hours PRN  diphenhydrAMINE 25 milliGRAM(s) Oral at bedtime PRN    REVIEW OF SYSTEMS  --------------------------------------------------------------------------------  Gen: No fevers/chills  Respiratory: No dyspnea, cough,   CV: No chest pain,   GI: See HPI,  Transplant: No pain  : No increased frequency,  MSK: No edema  Neuro: No dizziness/lightheadedness    All other systems were reviewed and are negative, except as noted.    VITALS/PHYSICAL EXAM  --------------------------------------------------------------------------------  T(C): 36.6 (08-25-22 @ 08:54), Max: 37 (08-24-22 @ 13:31)  HR: 71 (08-25-22 @ 08:54) (70 - 84)  BP: 145/63 (08-25-22 @ 08:54) (140/64 - 164/70)  RR: 20 (08-25-22 @ 08:54) (18 - 20)  SpO2: 97% (08-25-22 @ 08:54) (96% - 100%)  Wt(kg): --    08-24-22 @ 07:01  -  08-25-22 @ 07:00  --------------------------------------------------------  IN: 1720 mL / OUT: 2800 mL / NET: -1080 mL    Physical Exam:  	Gen: NAD, able to speak in full sentences   	HEENT: PERRL, MMM   	Pulm: CTA B/L, no crackles   	CV: RRR, S1S2+  	Abd: +BS, soft, RLQ with erythema and induration overlying the renal transplant site with extension into the groin.               Transplant: No tenderness, swelling  	: No suprapubic tenderness  	MSK: no edema   	Psych: Normal affect and mood  	Skin: Warm              Access: Left IJ non tunneled HD catheter +    LABS/STUDIES  --------------------------------------------------------------------------------              7.7    7.93  >-----------<  469      [08-25-22 @ 06:50]              25.1     135  |  95  |  56  ----------------------------<  126      [08-25-22 @ 06:55]  4.1   |  25  |  3.63        Ca     8.7     [08-25-22 @ 06:55]      Mg     2.0     [08-25-22 @ 06:55]      Phos  3.7     [08-25-22 @ 06:55]    TPro  6.4  /  Alb  2.5  /  TBili  0.2  /  DBili  0.2  /  AST  18  /  ALT  18  /  AlkPhos  308  [08-25-22 @ 06:55]    PTT: 31.2       [08-25-22 @ 06:52]    Creatinine Trend:  SCr 3.63 [08-25 @ 06:55]  SCr 4.84 [08-24 @ 06:46]  SCr 3.88 [08-23 @ 06:47]  SCr 5.79 [08-22 @ 06:56]  SCr 4.65 [08-21 @ 06:44]    Sirolimus (Rapamycin) Level, Serum: 5.4 ng/mL (08-24 @ 06:46)  Sirolimus (Rapamycin) Level, Serum: 4.1 ng/mL (08-23 @ 07:01)  Sirolimus (Rapamycin) Level, Serum: 4.7 ng/mL (08-22 @ 06:53)  Sirolimus (Rapamycin) Level, Serum: 4.1 ng/mL (08-21 @ 06:44)    Urinalysis - [08-16-22 @ 18:49]      Color Yellow / Appearance Slightly Turbid / SG 1.018 / pH 8.5      Gluc 200 mg/dL / Ketone Negative  / Bili Negative / Urobili Negative       Blood Large / Protein >600 / Leuk Est Moderate / Nitrite Positive      RBC 13 / WBC 40 / Hyaline 7 / Gran  / Sq Epi  / Non Sq Epi 4 / Bacteria Moderate    Iron 17, TIBC 171, %sat 10      [05-02-22 @ 13:03]  Ferritin 6336      [07-27-22 @ 13:00]  PTH -- (Ca 9.2)      [02-05-22 @ 10:13]   294  HbA1c 6.0      [02-21-20 @ 08:53]  TSH 4.69      [07-06-22 @ 18:36]  Lipid: chol --, , HDL --, LDL --      [07-27-22 @ 13:00]

## 2022-08-25 NOTE — PROGRESS NOTE ADULT - PROBLEM SELECTOR PLAN 2
IS meds- was on Belatacept (stared after rejection).Now on Sirolimus,  mg po bid and steroid taper per protocol.

## 2022-08-25 NOTE — PROGRESS NOTE ADULT - SUBJECTIVE AND OBJECTIVE BOX
Transplant Surgery - Multidisciplinary Progress Note  --------------------------------------------------------------  DDRT     4/21/2022            Present:  Patient seen with multidisciplinary team including Transplant Nephrologist: Dr. Alfred, , WALDO Hopson, PGY 3 Dr. Gimenez, nephrology fellow, and unit RN during am rounds. Disciplines not in attendance will be notified of the plan.      HPI: 66M with PMH: DM type II, HTN, CAD s/p CABG in 2020, AFib on Eliquis, CVA in 2019 due to Afib, Seizure d/o following CVA, last episode was on 4/8/22, h/o GIB in 2/2022 EGD with duodenal ulcer.  ESRD due to DM was on HD since 2019.     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  Donor was 58 , KDPI 82%, DCD, single vessels and ureter, HLA mismatch 1, 2, 2. No DSA, cPRA 0%. 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 Re-admitted in May for anemia in the setting of large perinephric hematoma s/p evacuation and repair of arterial anastomosis on 5/2/22. Intra operative biopsy showed no rejection, Creatinine ranging ~2mg/dL.    In Rehab:  He was recently dx with klebsiella UTI with Ertapenem - then switched to Levaquin    He also had parainfluenza pneumonia. Was at rehab progressing well.      Hospital course:  - 6/10: transferred from rehab facility for seizure status epilepticus. Intubated for respiratory protection and transferred to MICU.  EEG showed no seizure activity. Neuro consulted.   - 6/11: Extubated. Found to have R pleural effusion. s/p Thoracentesis 1.3L. Eliquis changed to heparin drip.  Post procedure drop in H&H. 5u PRBC, 2 u FFP.   - 6/11 evening: Transferred to SICU from MICU for further care in setting of hypoxia, significant R pleural effusion, anemia and hemodynamic instability  - 6/11 Intubated at 9pm and sedated on Precedex.  CT placed, 600ml blood drained.  1L total overnight, started pressors  - 6/11 Received Protamine for PTT >100 (was on Heparin drip started for AF), 2 u FFP and 5u PRBC total  - 6/13 s/p VATS 2.2L old blood removed; second chest tube placed  - 6/18-19: chest tubes removed  - 6/21: ?PRES vs. chronic ischemic changes on MRI, off Envarsus, switched to Belatacept 6/23 with good graft function  - 6/25: Tx to SICU for refractory seizures, improved with IV antiepileptic regimen  - 7/10: Downgraded from SICU to floor.   - 7/11: OR-->URETERAL STENT REMOVED  - 7/15: ESBL Kleb UTI (50-90K), completed Ertapenem x 3 Days  - 7/25: Tx to SICU for Sepsis/ elevated LFTS  - 7/27: Shiley placed for HD  - 7/28: ongoing fevers -> started Ertapenem/Fluc  - 7/29: HD restarted + 1U PRBC.  IR bx with 2A rejection, started pulse steroids. LFTs have improved  - 8/4: refractory seizures  - 8/8: s/p derm bx with no acute finding  - 8/8: s/p permacath exchange  - 8/16: One seizure on EEG medications changed added brivaracetam  - 8/22: HD, followed by U/S of Transplant kidney      Interval events:   - overnight, patient with increasing pain over right groin/flank over site of transplanted kidney      Plan of care:  See Below        REVIEW OF SYSTEMS  --------------------------------------------------------------------------------  unable to obtain full accurate ROS, but denies CP, SOB, HA, dizziness, N/V/D/C, abd discomfort.     PHYSICAL EXAM:  Constitutional: awake, weak  Eyes: Anicteric  ENMT: nc/at  Respiratory: not tachypneic, non-labored on RA    Cardiovascular: normotensive, regular rate on monitor   Gastrointestinal: Soft, non distended, nontender, RLQ incisional/groin  and right flank with significant induration   Genitourinary: anuric on HD   Extremities: SCD's in place and working bilaterally, overall with improving edema  Vascular: Palpable dp pulses bilaterally  Neurological: AAOx3  Skin:  As noted above. No ulcerations   Musculoskeletal: Moving all extremities  Psychiatric: overall calm, following directions

## 2022-08-25 NOTE — PROGRESS NOTE ADULT - TIME BILLING
DDRT from DCD donor on 4/21/22  with Grade 2a rejection (biopsy on 7/29)  some glomerulitis and very minimal peritubular capillaritis, but his C4d is negative. No  DSA.   s/p pulse dose steroids. remains oliguric and dialysis dependent. Will repeat allograft sonogram Thursday  IS meds- on Sirolimus , target trough 4-6 ng/ml  mg po bid and prednisone. Has had Belatacept 3 weeks ago. Will hold off further dose  AMS , seizures - Noted recent medication changes. Improved mental status, communicative   DM -  on Lantus and lispro. Adjusted for optimized glycemic control, monitor PO intake  HTN- Improved control, adjusted hydralazine dose on 8/21, hemodialysis with fluid removal today  Cellulitis/ abdominal wall edema over RLQ - Skin bx done on 8/8/22 noted findings. ID follow up noted.   Noted ultrasound done on 8/22/22 , will repeat on 8/25/22  Physical therapy  I have seen the patient and reviewed dialysis prescription . Dialysis access noted.  Dialysis prescription has been adjusted for optimized control of volume status. Management of additional metabolic abnormalities/anemia will continue to be addressed on follow up.  Will transfuse one unit of PRBC    I was present during and reviewed clinical and lab data as well as assessment and plan as documented . Please contact if any additional questions with any change in clinical condition or on availability of any additional information or reports.  Plan of care d/w house staff and transplant team.

## 2022-08-25 NOTE — PROGRESS NOTE ADULT - PROBLEM SELECTOR PLAN 5
Pt with B/L LE/UE swelling R>L, doppler studies negative of DVT. Continue Subq Heparin. Cannot use Eliquis given interaction with Dilantin. Edema greatly improving with fluid removal. Last HD done on 8/24/22. HD plan as outline above

## 2022-08-25 NOTE — PROGRESS NOTE ADULT - NS ATTEND AMEND GEN_ALL_CORE FT
agree with above  generally making some progress  eating and ambulating with help  remains on HD  immunosuppression sirolimus, mmf  and steroids

## 2022-08-25 NOTE — PROGRESS NOTE ADULT - SUBJECTIVE AND OBJECTIVE BOX
[facility]   MD NINA MASON MD, PA KRISTINE SOLTANPOUR, DO INJUNG KO, NP    TEL:  OFFICE: [phone]    From 5pm-7am Answering Service [phone]    -- RENAL FOLLOW UP NOTE ---Date of Service 08-25-22 @ 14:35    Patient is a 66y old  Male who presents with a chief complaint of Status Epilepticus (25 Aug 2022 09:39)      Patient seen and examined at bedside. No chest pain/sob    VITALS:  T(F): 97.6 (08-25-22 @ 13:05), Max: 98.4 (08-24-22 @ 20:09)  HR: 69 (08-25-22 @ 13:05)  BP: 154/70 (08-25-22 @ 13:05)  RR: 18 (08-25-22 @ 13:05)  SpO2: 98% (08-25-22 @ 13:05)  Wt(kg): --    08-24 @ 07:01  -  08-25 @ 07:00  --------------------------------------------------------  IN: 1720 mL / OUT: 2800 mL / NET: -1080 mL    08-25 @ 07:01  -  08-25 @ 14:35  --------------------------------------------------------  IN: 360 mL / OUT: 0 mL / NET: 360 mL          PHYSICAL EXAM:  Constitutional: NAD  Neck: No JVD  Respiratory: CTAB, no wheezes, rales or rhonchi  Cardiovascular: S1, S2, RRR  Gastrointestinal: BS+, soft, NT/ND  Extremities: No peripheral edema    Hospital Medications:   MEDICATIONS  (STANDING):  amLODIPine   Tablet 10 milliGRAM(s) Oral daily  brivaracetam 50 milliGRAM(s) Oral two times a day  cephalexin 500 milliGRAM(s) Oral every 12 hours  chlorhexidine 2% Cloths 1 Application(s) Topical <User Schedule>  doxazosin 4 milliGRAM(s) Oral <User Schedule>  epoetin cortney-epbx (RETACRIT) Injectable 31102 Unit(s) SubCutaneous <User Schedule>  finasteride 5 milliGRAM(s) Oral daily  heparin   Injectable 5000 Unit(s) SubCutaneous every 12 hours  hydrALAZINE 75 milliGRAM(s) Oral three times a day  insulin glargine Injectable (LANTUS) 7 Unit(s) SubCutaneous at bedtime  insulin lispro (ADMELOG) corrective regimen sliding scale   SubCutaneous three times a day before meals  insulin lispro (ADMELOG) corrective regimen sliding scale   SubCutaneous at bedtime  insulin lispro Injectable (ADMELOG) 9 Unit(s) SubCutaneous three times a day before meals  levothyroxine 50 MICROGram(s) Oral daily  melatonin 6 milliGRAM(s) Oral at bedtime  nystatin    Suspension 051325 Unit(s) Oral four times a day  pantoprazole    Tablet 40 milliGRAM(s) Oral <User Schedule>  phenytoin   Capsule 200 milliGRAM(s) Oral two times a day  polyethylene glycol 3350 17 Gram(s) Oral daily  predniSONE   Tablet 10 milliGRAM(s) Oral daily  senna 2 Tablet(s) Oral at bedtime  sirolimus 6 milliGRAM(s) Oral <User Schedule>  trimethoprim   80 mG/sulfamethoxazole 400 mG 1 Tablet(s) Oral daily  valGANciclovir 450 milliGRAM(s) Oral <User Schedule>      LABS:  08-25    135  |  95<L>  |  56<H>  ----------------------------<  126<H>  4.1   |  25  |  3.63<H>    Ca    8.7      25 Aug 2022 06:55  Phos  3.7     08-25  Mg     2.0     08-25    TPro  6.4  /  Alb  2.5<L>  /  TBili  0.2  /  DBili  0.2  /  AST  18  /  ALT  18  /  AlkPhos  308<H>  08-25    Creatinine Trend: 3.63 <--, 4.84 <--, 3.88 <--, 5.79 <--, 4.65 <--, 3.59 <--, 4.80 <--    Albumin, Serum: 2.5 g/dL (08-25 @ 06:55)  Phosphorus Level, Serum: 3.7 mg/dL (08-25 @ 06:55)                              7.7    7.93  )-----------( 469      ( 25 Aug 2022 06:50 )             25.1     Urine Studies:  Urinalysis - [08-16-22 @ 18:49]      Color Yellow / Appearance Slightly Turbid / SG 1.018 / pH 8.5      Gluc 200 mg/dL / Ketone Negative  / Bili Negative / Urobili Negative       Blood Large / Protein >600 / Leuk Est Moderate / Nitrite Positive      RBC 13 / WBC 40 / Hyaline 7 / Gran  / Sq Epi  / Non Sq Epi 4 / Bacteria Moderate      Iron 17, TIBC 171, %sat 10      [05-02-22 @ 13:03]  Ferritin 6336      [07-27-22 @ 13:00]  PTH -- (Ca 9.2)      [02-05-22 @ 10:13]   294  HbA1c 6.0      [02-21-20 @ 08:53]  TSH 4.69      [07-06-22 @ 18:36]  Lipid: chol --, , HDL --, LDL --      [07-27-22 @ 13:00]        RADIOLOGY & ADDITIONAL STUDIES:

## 2022-08-25 NOTE — PROGRESS NOTE ADULT - ASSESSMENT
66 yr old Male with PMHx ESRD s/p permacath removal 5/10/22 s/p Rt DDRT 4/21/22,  CVA (2019), Afib on apixaban, seizure activity, CAD s/p stents, CABG (2020), HTN, HLD, DM on insulin, gastric/duodenal ulcer, recent hospitalization 4/28/22 -5/10/22 with weakness/anemia found to have perinephric hematoma requiring evacuation and repair of bleeding arterial anastomosis. Curently being treated for UTI with Levaquin Today after developing multiple sz episodes refractory to 5 mg versed IM (EMS) and 2 mg ativan IV in E.D. concerning for status epilepticus requiring intubation for hypoxic respiratory  failure. MICU Consult was called for hypoxic respiratory failure secondary to status epilepticus.  Nephrology consulted for renal failure.     A/P  DCD KTX on 04/21/22  Course complicated by DGF, was on HD until 4/29/22. Readmitted in May for anemia in the setting of large perinephric hematoma s/p evacuation and repair of arterial anastomosis on 5/02/22. Intra operative biopsy showed no rejection.  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   Now with failed allograft function, Remains dialysis dependent.   continue Immunosuppression per transplant team  last HD 08/24/22, plan for PUF today - RRT per transplant team   transplant team on board   monitor BMP, U/O     Immunosuppression per transplant team  - Belatacept: 6/23-8/1/2022 discontinued  - on Sirolimus since 8/1/2022 based on level, Cellcept 250 BID, Prednisone 10 mg QD 8/16  - PPX:  Nystatin, bactrim, valcyte (MWF)        HTN   BP manage by transplant team    monitor BP closely

## 2022-08-25 NOTE — CHART NOTE - NSCHARTNOTEFT_GEN_A_CORE
Age: 66y    Gender: Male    POCT Blood Glucose:  202 mg/dL (08-25-22 @ 17:49)  117 mg/dL (08-25-22 @ 13:20)  131 mg/dL (08-25-22 @ 12:11)  156 mg/dL (08-25-22 @ 08:26)  97 mg/dL (08-24-22 @ 21:11)      eMAR:  finasteride   5 milliGRAM(s) Oral (08-25-22 @ 12:24)    insulin glargine Injectable (LANTUS)   7 Unit(s) SubCutaneous (08-24-22 @ 22:08)    insulin lispro (ADMELOG) corrective regimen sliding scale   2 Unit(s) SubCutaneous (08-25-22 @ 17:53)   1 Unit(s) SubCutaneous (08-25-22 @ 08:45)    insulin lispro Injectable (ADMELOG)   9 Unit(s) SubCutaneous (08-25-22 @ 17:53)   9 Unit(s) SubCutaneous (08-25-22 @ 08:44)    levothyroxine   50 MICROGram(s) Oral (08-25-22 @ 06:15)    predniSONE   Tablet   10 milliGRAM(s) Oral (08-25-22 @ 06:15)    noted lunch prandial insulin held per team as bg 117 and pt undergoing HD now w/ BG above goal at dinner,   no pattern for adjustment at this time   Remains on prednisone 10mg po qd  plan:  -test BG AC/HS  -Decrease Lantus dose to 7 units QHS  -c/w Admelog 9 units AC meals (hold if not eating), if BG <130 contact provider for admelog 4 units x1 instead if tolerating PO  -c/w Admelog low correction scale AC and low HS scale  -cons carb diet  -notify endocrine team if steroids changed as insulin regimen will need adjustment

## 2022-08-26 NOTE — PROGRESS NOTE ADULT - PROBLEM SELECTOR PLAN 2
IS meds- was on Belatacept (stared after rejection).Now on Sirolimus,  mg po bid and steroid taper per protocol IS meds- was on Belatacept (stared after rejection).Now on Sirolimus,  and steroid

## 2022-08-26 NOTE — PROGRESS NOTE ADULT - SUBJECTIVE AND OBJECTIVE BOX
Follow Up:      Interval History:    REVIEW OF SYSTEMS  [  ] ROS unobtainable because:    [  ] All other systems negative except as noted below    Constitutional:  [ ] fever [ ] chills  [ ] weight loss  [ ] weakness  Skin:  [ ] rash [ ] phlebitis	  Eyes: [ ] icterus [ ] pain  [ ] discharge	  ENMT: [ ] sore throat  [ ] thrush [ ] ulcers [ ] exudates  Respiratory: [ ] dyspnea [ ] hemoptysis [ ] cough [ ] sputum	  Cardiovascular:  [ ] chest pain [ ] palpitations [ ] edema	  Gastrointestinal:  [ ] nausea [ ] vomiting [ ] diarrhea [ ] constipation [ ] pain	  Genitourinary:  [ ] dysuria [ ] frequency [ ] hematuria [ ] discharge [ ] flank pain  [ ] incontinence  Musculoskeletal:  [ ] myalgias [ ] arthralgias [ ] arthritis  [ ] back pain  Neurological:  [ ] headache [ ] seizures  [ ] confusion/altered mental status    Allergies  No Known Allergies        ANTIMICROBIALS:  cephalexin 500 every 12 hours  nystatin    Suspension 606692 four times a day  trimethoprim   80 mG/sulfamethoxazole 400 mG 1 daily  valGANciclovir 450 <User Schedule>      OTHER MEDS:  MEDICATIONS  (STANDING):  acetaminophen     Tablet .. 650 every 6 hours PRN  amLODIPine   Tablet 10 daily  brivaracetam 50 two times a day  diphenhydrAMINE 25 at bedtime PRN  doxazosin 4 <User Schedule>  epoetin cortney-epbx (RETACRIT) Injectable 21092 <User Schedule>  finasteride 5 daily  heparin   Injectable 5000 every 12 hours  hydrALAZINE 75 three times a day  insulin glargine Injectable (LANTUS) 7 at bedtime  insulin lispro (ADMELOG) corrective regimen sliding scale  three times a day before meals  insulin lispro (ADMELOG) corrective regimen sliding scale  at bedtime  insulin lispro Injectable (ADMELOG) 9 three times a day before meals  levothyroxine 50 daily  melatonin 6 at bedtime  pantoprazole    Tablet 40 <User Schedule>  phenytoin   Capsule 200 two times a day  polyethylene glycol 3350 17 daily  predniSONE   Tablet 10 daily  senna 2 at bedtime  sirolimus 6 <User Schedule>      Vital Signs Last 24 Hrs  T(C): 36.7 (26 Aug 2022 16:38), Max: 37.4 (26 Aug 2022 01:00)  T(F): 98 (26 Aug 2022 16:38), Max: 99.3 (26 Aug 2022 01:00)  HR: 71 (26 Aug 2022 16:38) (71 - 85)  BP: 163/72 (26 Aug 2022 16:38) (116/54 - 170/71)  BP(mean): 104 (26 Aug 2022 16:38) (104 - 104)  RR: 18 (26 Aug 2022 16:38) (18 - 20)  SpO2: 100% (26 Aug 2022 16:38) (98% - 100%)    Parameters below as of 26 Aug 2022 16:38  Patient On (Oxygen Delivery Method): room air        PHYSICAL EXAMINATION:  General: Alert and Awake, NAD  HEENT: PERRL, EOMI  Neck: Supple  Cardiac: RRR, No M/R/G  Resp: CTAB, No Wh/Rh/Ra  Abdomen: NBS, NT/ND, No HSM, No rigidity or guarding  MSK: No LE edema. No Calf tenderness  : No tucker  Skin: No rashes or lesions. Skin is warm and dry to the touch.   Neuro: Alert and Awake. CN 2-12 Grossly intact. Moves all four extremities spontaneously.  Psych: Calm, Pleasant, Cooperative                          8.2    7.42  )-----------( 501      ( 26 Aug 2022 06:12 )             26.7       08-26    133<L>  |  94<L>  |  83<H>  ----------------------------<  168<H>  5.0   |  23  |  4.83<H>    Ca    9.2      26 Aug 2022 06:16  Phos  5.3     08-26  Mg     2.0     08-26    TPro  6.7  /  Alb  2.7<L>  /  TBili  0.3  /  DBili  0.2  /  AST  14  /  ALT  17  /  AlkPhos  317<H>  08-26          MICROBIOLOGY:  Vancomycin Level, Random: <4.0 ug/mL (08-26-22 @ 06:13)  v  .Blood Blood-Peripheral  08-14-22   No Growth Final  --  --      .Blood Blood-Peripheral  08-09-22   No Growth Final  --  --      .Tissue Other  08-08-22   No growth  --    No polymorphonuclear cells seen seen per low power field  No organisms seen per oil power field      .Blood Blood-Peripheral  08-08-22   Growth in aerobic bottle: Staphylococcus hominis  Growth in aerobic bottle: Staphylococcus epidermidis  Coag Negative Staphylococcus  Single set isolate, possible contaminant. Contact  Microbiology if susceptibility testing clinically  indicated.  ***Blood Panel PCR results on this specimen are available  approximately 3 hours after the Gram stain result.***  Gram stain, PCR, and/or culture results may not always  correspond due to difference in methodologies.  ************************************************************  This PCR assay was performed by multiplex PCR. This  Assay tests for 66 bacterial and resistance gene targets.  Please refer to the [facility] Labs test directory  at [email or web address] for details.  --  Blood Culture PCR      .Blood Blood  08-03-22   No Growth Final  --  --      .Blood Blood-Peripheral  07-30-22   No Growth Final  --  --      .Blood Blood-Peripheral  07-30-22   No Growth Final  --  --                RADIOLOGY:    <The imaging below has been reviewed and visualized by me independently. Findings as detailed in report below> Follow Up:  abdominal wall rash    Interval History: minimal pain at the right lower quadrant abdominal wall. afebrile.     REVIEW OF SYSTEMS  [  ] ROS unobtainable because:    [ x ] All other systems negative except as noted below    Constitutional:  [ ] fever [ ] chills  [ ] weight loss  [ ] weakness  Skin:  [ x] rash [ ] phlebitis	  Eyes: [ ] icterus [ ] pain  [ ] discharge	  ENMT: [ ] sore throat  [ ] thrush [ ] ulcers [ ] exudates  Respiratory: [ ] dyspnea [ ] hemoptysis [ ] cough [ ] sputum	  Cardiovascular:  [ ] chest pain [ ] palpitations [ ] edema	  Gastrointestinal:  [ ] nausea [ ] vomiting [ ] diarrhea [ ] constipation [ ] pain	  Genitourinary:  [ ] dysuria [ ] frequency [ ] hematuria [ ] discharge [ ] flank pain  [ ] incontinence  Musculoskeletal:  [ ] myalgias [ ] arthralgias [ ] arthritis  [ ] back pain  Neurological:  [ ] headache [ ] seizures  [ ] confusion/altered mental status    Allergies  No Known Allergies        ANTIMICROBIALS:  cephalexin 500 every 12 hours  nystatin    Suspension 577901 four times a day  trimethoprim   80 mG/sulfamethoxazole 400 mG 1 daily  valGANciclovir 450 <User Schedule>      OTHER MEDS:  MEDICATIONS  (STANDING):  acetaminophen     Tablet .. 650 every 6 hours PRN  amLODIPine   Tablet 10 daily  brivaracetam 50 two times a day  diphenhydrAMINE 25 at bedtime PRN  doxazosin 4 <User Schedule>  epoetin cortney-epbx (RETACRIT) Injectable 71673 <User Schedule>  finasteride 5 daily  heparin   Injectable 5000 every 12 hours  hydrALAZINE 75 three times a day  insulin glargine Injectable (LANTUS) 7 at bedtime  insulin lispro (ADMELOG) corrective regimen sliding scale  three times a day before meals  insulin lispro (ADMELOG) corrective regimen sliding scale  at bedtime  insulin lispro Injectable (ADMELOG) 9 three times a day before meals  levothyroxine 50 daily  melatonin 6 at bedtime  pantoprazole    Tablet 40 <User Schedule>  phenytoin   Capsule 200 two times a day  polyethylene glycol 3350 17 daily  predniSONE   Tablet 10 daily  senna 2 at bedtime  sirolimus 6 <User Schedule>      Vital Signs Last 24 Hrs  T(C): 36.7 (26 Aug 2022 16:38), Max: 37.4 (26 Aug 2022 01:00)  T(F): 98 (26 Aug 2022 16:38), Max: 99.3 (26 Aug 2022 01:00)  HR: 71 (26 Aug 2022 16:38) (71 - 85)  BP: 163/72 (26 Aug 2022 16:38) (116/54 - 170/71)  BP(mean): 104 (26 Aug 2022 16:38) (104 - 104)  RR: 18 (26 Aug 2022 16:38) (18 - 20)  SpO2: 100% (26 Aug 2022 16:38) (98% - 100%)    Parameters below as of 26 Aug 2022 16:38  Patient On (Oxygen Delivery Method): room air    PHYSICAL EXAMINATION:  General: Alert and Awake, NAD  Cardiac: RRR, No M/R/G  Resp: CTAB, No Wh/Rh/Ra  Abdomen: RLQ with erythema and induration overlying the renal transplant site with extension into the groin.   MSK: No LE edema. No Calf tenderness  Skin: No rashes or lesions. Skin is warm and dry to the touch.   Neuro: Alert and Awake. CN 2-12 Grossly intact. Moves all four extremities spontaneously.  Psych: Calm, Pleasant, Cooperative                          8.2    7.42  )-----------( 501      ( 26 Aug 2022 06:12 )             26.7       08-26    133<L>  |  94<L>  |  83<H>  ----------------------------<  168<H>  5.0   |  23  |  4.83<H>    Ca    9.2      26 Aug 2022 06:16  Phos  5.3     08-26  Mg     2.0     08-26    TPro  6.7  /  Alb  2.7<L>  /  TBili  0.3  /  DBili  0.2  /  AST  14  /  ALT  17  /  AlkPhos  317<H>  08-26          MICROBIOLOGY:  Vancomycin Level, Random: <4.0 ug/mL (08-26-22 @ 06:13)  v  .Blood Blood-Peripheral  08-14-22   No Growth Final  --  --      .Blood Blood-Peripheral  08-09-22   No Growth Final  --  --      .Tissue Other  08-08-22   No growth  --    No polymorphonuclear cells seen seen per low power field  No organisms seen per oil power field      .Blood Blood-Peripheral  08-08-22   Growth in aerobic bottle: Staphylococcus hominis  Growth in aerobic bottle: Staphylococcus epidermidis  Coag Negative Staphylococcus  Single set isolate, possible contaminant. Contact  Microbiology if susceptibility testing clinically  indicated.  ***Blood Panel PCR results on this specimen are available  approximately 3 hours after the Gram stain result.***  Gram stain, PCR, and/or culture results may not always  correspond due to difference in methodologies.  ************************************************************  This PCR assay was performed by multiplex PCR. This  Assay tests for 66 bacterial and resistance gene targets.  Please refer to the [facility] Labs test directory  at [email or web address] for details.  --  Blood Culture PCR      .Blood Blood  08-03-22   No Growth Final  --  --      .Blood Blood-Peripheral  07-30-22   No Growth Final  --  --      .Blood Blood-Peripheral  07-30-22   No Growth Final  --  --    RADIOLOGY:    <The imaging below has been reviewed and visualized by me independently. Findings as detailed in report below>    < from: US Trans Kidney w/ Doppler, Right (08.25.22 @ 18:20) >  IMPRESSION:    No evidence of a significant renal artery stenosis.    Increased renal cortical echogenicity and increased resistive indices   suggestive of parenchymal renal disease..    < end of copied text >

## 2022-08-26 NOTE — PROGRESS NOTE ADULT - SUBJECTIVE AND OBJECTIVE BOX
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 was seen and examined today. Overnight events noted.   Pt sitting in his chair comfortably. Reports RUQ pain - intermittent. Denies N/V, fever, chills, diarrhea or constipation or dizziness. Last Hd done on 8/24/22 and UF with ~ 2L fluid removal yesterday (8/25/22). Tolerated well.     PAST HISTORY  --------------------------------------------------------------------------------  No significant changes to PMH, PSH, FHx, SHx, unless otherwise noted    ALLERGIES & MEDICATIONS  --------------------------------------------------------------------------------  Allergies    No Known Allergies    Intolerances    Standing Inpatient Medications  amLODIPine   Tablet 10 milliGRAM(s) Oral daily  brivaracetam 50 milliGRAM(s) Oral two times a day  cephalexin 500 milliGRAM(s) Oral every 12 hours  chlorhexidine 2% Cloths 1 Application(s) Topical <User Schedule>  doxazosin 4 milliGRAM(s) Oral <User Schedule>  epoetin cortney-epbx (RETACRIT) Injectable 77082 Unit(s) SubCutaneous <User Schedule>  finasteride 5 milliGRAM(s) Oral daily  heparin   Injectable 5000 Unit(s) SubCutaneous every 12 hours  hydrALAZINE 75 milliGRAM(s) Oral three times a day  insulin glargine Injectable (LANTUS) 7 Unit(s) SubCutaneous at bedtime  insulin lispro (ADMELOG) corrective regimen sliding scale   SubCutaneous three times a day before meals  insulin lispro (ADMELOG) corrective regimen sliding scale   SubCutaneous at bedtime  insulin lispro Injectable (ADMELOG) 9 Unit(s) SubCutaneous three times a day before meals  levothyroxine 50 MICROGram(s) Oral daily  melatonin 6 milliGRAM(s) Oral at bedtime  nystatin    Suspension 895675 Unit(s) Oral four times a day  pantoprazole    Tablet 40 milliGRAM(s) Oral <User Schedule>  phenytoin   Capsule 200 milliGRAM(s) Oral two times a day  polyethylene glycol 3350 17 Gram(s) Oral daily  predniSONE   Tablet 10 milliGRAM(s) Oral daily  senna 2 Tablet(s) Oral at bedtime  sirolimus 6 milliGRAM(s) Oral <User Schedule>  trimethoprim   80 mG/sulfamethoxazole 400 mG 1 Tablet(s) Oral daily  valGANciclovir 450 milliGRAM(s) Oral <User Schedule>    PRN Inpatient Medications  acetaminophen     Tablet .. 650 milliGRAM(s) Oral every 6 hours PRN  diphenhydrAMINE 25 milliGRAM(s) Oral at bedtime PRN    REVIEW OF SYSTEMS  --------------------------------------------------------------------------------  Gen: No fevers/chills  Respiratory: No dyspnea, cough,   CV: No chest pain,   GI: See HPI,  Transplant: No pain  : No increased frequency,  MSK: No edema  Neuro: No dizziness/lightheadedness    All other systems were reviewed and are negative, except as noted.    VITALS/PHYSICAL EXAM  --------------------------------------------------------------------------------  T(C): 36.6 (08-26-22 @ 09:23), Max: 37.4 (08-26-22 @ 01:00)  HR: 76 (08-26-22 @ 09:23) (68 - 85)  BP: 145/67 (08-26-22 @ 09:23) (128/56 - 170/71)  RR: 20 (08-26-22 @ 09:23) (18 - 20)  SpO2: 98% (08-26-22 @ 09:23) (98% - 100%)  Wt(kg): --    08-25-22 @ 07:01  -  08-26-22 @ 07:00  --------------------------------------------------------  IN: 960 mL / OUT: 2700 mL / NET: -1740 mL    Physical Exam:  	Gen: NAD, able to speak in full sentences   	HEENT: PERRL, MMM   	Pulm: CTA B/L, no crackles   	CV: RRR, S1S2+  	Abd: +BS, soft, RLQ with erythema and induration overlying the renal transplant site with extension into the groin.               Transplant: No tenderness, swelling  	: No suprapubic tenderness  	MSK: no edema   	Psych: Normal affect and mood  	Skin: Warm              Access: Left IJ non tunneled HD catheter +    LABS/STUDIES  --------------------------------------------------------------------------------              8.2    7.42  >-----------<  501      [08-26-22 @ 06:12]              26.7     133  |  94  |  83  ----------------------------<  168      [08-26-22 @ 06:16]  5.0   |  23  |  4.83        Ca     9.2     [08-26-22 @ 06:16]      Mg     2.0     [08-26-22 @ 06:16]      Phos  5.3     [08-26-22 @ 06:16]    TPro  6.7  /  Alb  2.7  /  TBili  0.3  /  DBili  0.2  /  AST  14  /  ALT  17  /  AlkPhos  317  [08-26-22 @ 06:16]    PTT: 31.8       [08-26-22 @ 06:12]    Creatinine Trend:  SCr 4.83 [08-26 @ 06:16]  SCr 3.63 [08-25 @ 06:55]  SCr 4.84 [08-24 @ 06:46]  SCr 3.88 [08-23 @ 06:47]  SCr 5.79 [08-22 @ 06:56]    Sirolimus (Rapamycin) Level, Serum: 4.2 ng/mL (08-25 @ 06:51)  Sirolimus (Rapamycin) Level, Serum: 5.4 ng/mL (08-24 @ 06:46)  Sirolimus (Rapamycin) Level, Serum: 4.1 ng/mL (08-23 @ 07:01)  Sirolimus (Rapamycin) Level, Serum: 4.7 ng/mL (08-22 @ 06:53)    Urinalysis - [08-16-22 @ 18:49]      Color Yellow / Appearance Slightly Turbid / SG 1.018 / pH 8.5      Gluc 200 mg/dL / Ketone Negative  / Bili Negative / Urobili Negative       Blood Large / Protein >600 / Leuk Est Moderate / Nitrite Positive      RBC 13 / WBC 40 / Hyaline 7 / Gran  / Sq Epi  / Non Sq Epi 4 / Bacteria Moderate

## 2022-08-26 NOTE — PROGRESS NOTE ADULT - ASSESSMENT
66 yr old Male with PMHx ESRD s/p permacath removal 5/10/22 s/p Rt DDRT 4/21/22,  CVA (2019), Afib on apixaban, seizure activity, CAD s/p stents, CABG (2020), HTN, HLD, DM on insulin, gastric/duodenal ulcer, recent hospitalization 4/28/22 -5/10/22 with weakness/anemia found to have perinephric hematoma requiring evacuation and repair of bleeding arterial anastomosis. Curently being treated for UTI with Levaquin Today after developing multiple sz episodes refractory to 5 mg versed IM (EMS) and 2 mg ativan IV in E.D. concerning for status epilepticus requiring intubation for hypoxic respiratory  failure. MICU Consult was called for hypoxic respiratory failure secondary to status epilepticus.  Nephrology consulted for renal failure.     A/P  DCD KTX on 04/21/22  Course complicated by DGF, was on HD until 4/29/22. Readmitted in May for anemia in the setting of large perinephric hematoma s/p evacuation and repair of arterial anastomosis on 5/02/22. Intra operative biopsy showed no rejection.  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   Now with failed allograft function, Remains dialysis dependent.   continue Immunosuppression per transplant team  HD today  - RRT per transplant team   transplant team on board   monitor BMP, U/O     Immunosuppression per transplant team  - Belatacept: 6/23-8/1/2022 discontinued  - on Sirolimus since 8/1/2022 based on level, Cellcept 250 BID, Prednisone 10 mg QD 8/16  - PPX:  Nystatin, bactrim, valcyte (MWF)        HTN   BP manage by transplant team    monitor BP closely

## 2022-08-26 NOTE — PROGRESS NOTE ADULT - PROBLEM SELECTOR PLAN 5
Pt with B/L LE/UE swelling R>L, doppler studies negative of DVT. Continue Subq Heparin. Cannot use Eliquis given interaction with Dilantin. Edema greatly improving with fluid removal. Last HD done on 8/24/22 and UF yesterday (8/25/22). HD plan as outline above .

## 2022-08-26 NOTE — PROGRESS NOTE ADULT - SUBJECTIVE AND OBJECTIVE BOX
Transplant Surgery - Multidisciplinary Progress Note  --------------------------------------------------------------  DDRT     4/21/2022            Present:  Patient seen with multidisciplinary team including Transplant Nephrologist: Dr. Alfred, , WALDO Hopson, PGY 3 Dr. Gimenez, nephrology fellow, and unit RN during am rounds. Disciplines not in attendance will be notified of the plan.      HPI: 66M with PMH: DM type II, HTN, CAD s/p CABG in 2020, AFib on Eliquis, CVA in 2019 due to Afib, Seizure d/o following CVA, last episode was on 4/8/22, h/o GIB in 2/2022 EGD with duodenal ulcer.  ESRD due to DM was on HD since 2019.     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  Donor was 58 , KDPI 82%, DCD, single vessels and ureter, HLA mismatch 1, 2, 2. No DSA, cPRA 0%. 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 Re-admitted in May for anemia in the setting of large perinephric hematoma s/p evacuation and repair of arterial anastomosis on 5/2/22. Intra operative biopsy showed no rejection, Creatinine ranging ~2mg/dL.    In Rehab:  He was recently dx with klebsiella UTI with Ertapenem - then switched to Levaquin    He also had parainfluenza pneumonia. Was at rehab progressing well.      Hospital course:  - 6/10: transferred from rehab facility for seizure status epilepticus. Intubated for respiratory protection and transferred to MICU.  EEG showed no seizure activity. Neuro consulted.   - 6/11: Extubated. Found to have R pleural effusion. s/p Thoracentesis 1.3L. Eliquis changed to heparin drip.  Post procedure drop in H&H. 5u PRBC, 2 u FFP.   - 6/11 evening: Transferred to SICU from MICU for further care in setting of hypoxia, significant R pleural effusion, anemia and hemodynamic instability  - 6/11 Intubated at 9pm and sedated on Precedex.  CT placed, 600ml blood drained.  1L total overnight, started pressors  - 6/11 Received Protamine for PTT >100 (was on Heparin drip started for AF), 2 u FFP and 5u PRBC total  - 6/13 s/p VATS 2.2L old blood removed; second chest tube placed  - 6/18-19: chest tubes removed  - 6/21: ?PRES vs. chronic ischemic changes on MRI, off Envarsus, switched to Belatacept 6/23 with good graft function  - 6/25: Tx to SICU for refractory seizures, improved with IV antiepileptic regimen  - 7/10: Downgraded from SICU to floor.   - 7/11: OR-->URETERAL STENT REMOVED  - 7/15: ESBL Kleb UTI (50-90K), completed Ertapenem x 3 Days  - 7/25: Tx to SICU for Sepsis/ elevated LFTS  - 7/27: Shiley placed for HD  - 7/28: ongoing fevers -> started Ertapenem/Fluc  - 7/29: HD restarted + 1U PRBC.  IR bx with 2A rejection, started pulse steroids. LFTs have improved  - 8/4: refractory seizures  - 8/8: s/p derm bx with no acute finding  - 8/8: s/p permacath exchange  - 8/16: One seizure on EEG medications changed added brivaracetam  - 8/22: HD, followed by U/S of Transplant kidney      Interval events:   - no acute events overnight      Plan of care:  See Below    MEDICATIONS  (STANDING):  amLODIPine   Tablet 10 milliGRAM(s) Oral daily  brivaracetam 50 milliGRAM(s) Oral two times a day  cephalexin 500 milliGRAM(s) Oral every 12 hours  chlorhexidine 2% Cloths 1 Application(s) Topical <User Schedule>  doxazosin 4 milliGRAM(s) Oral <User Schedule>  epoetin cortney-epbx (RETACRIT) Injectable 88201 Unit(s) SubCutaneous <User Schedule>  finasteride 5 milliGRAM(s) Oral daily  heparin   Injectable 5000 Unit(s) SubCutaneous every 12 hours  hydrALAZINE 75 milliGRAM(s) Oral three times a day  insulin glargine Injectable (LANTUS) 7 Unit(s) SubCutaneous at bedtime  insulin lispro (ADMELOG) corrective regimen sliding scale   SubCutaneous three times a day before meals  insulin lispro (ADMELOG) corrective regimen sliding scale   SubCutaneous at bedtime  insulin lispro Injectable (ADMELOG) 9 Unit(s) SubCutaneous three times a day before meals  levothyroxine 50 MICROGram(s) Oral daily  melatonin 6 milliGRAM(s) Oral at bedtime  nystatin    Suspension 339658 Unit(s) Oral four times a day  pantoprazole    Tablet 40 milliGRAM(s) Oral <User Schedule>  phenytoin   Capsule 200 milliGRAM(s) Oral two times a day  polyethylene glycol 3350 17 Gram(s) Oral daily  predniSONE   Tablet 10 milliGRAM(s) Oral daily  senna 2 Tablet(s) Oral at bedtime  sirolimus 6 milliGRAM(s) Oral <User Schedule>  trimethoprim   80 mG/sulfamethoxazole 400 mG 1 Tablet(s) Oral daily  valGANciclovir 450 milliGRAM(s) Oral <User Schedule>    MEDICATIONS  (PRN):  acetaminophen     Tablet .. 650 milliGRAM(s) Oral every 6 hours PRN Mild Pain (1 - 3)  diphenhydrAMINE 25 milliGRAM(s) Oral at bedtime PRN Insomnia      PAST MEDICAL & SURGICAL HISTORY:  Hypertension      Diabetes      Dyslipidemia      CAD (Coronary Artery Disease)  with Stents in 06/2009 and 6/2019, s/p off-pump C3L on 8/13/20      Hypothyroidism      CVA (cerebral vascular accident)  12/13/19 with residual bilateral weakness      Anemia      PEG (percutaneous endoscopic gastrostomy) status  removed July 2020      Intubation of airway performed without difficulty  Dec 2019  CVA c/b status epilepticus requiring intubation and PEG in 12/2019-      ESRD on dialysis  M/W/F      Seizures  after CVA, last seizure was last week 4/07/22      History of insertion of stent into coronary artery bypass graft  2009 and 2019      S/P CABG x 3  off pump C3L on 8/13/20          Vital Signs Last 24 Hrs  T(C): 36.4 (26 Aug 2022 05:00), Max: 37.4 (26 Aug 2022 01:00)  T(F): 97.6 (26 Aug 2022 05:00), Max: 99.3 (26 Aug 2022 01:00)  HR: 79 (26 Aug 2022 05:00) (68 - 85)  BP: 170/71 (26 Aug 2022 05:00) (128/56 - 170/71)  BP(mean): --  RR: 18 (26 Aug 2022 05:00) (18 - 20)  SpO2: 99% (26 Aug 2022 05:00) (98% - 100%)    Parameters below as of 26 Aug 2022 05:00  Patient On (Oxygen Delivery Method): room air        I&O's Summary    25 Aug 2022 07:01  -  26 Aug 2022 07:00  --------------------------------------------------------  IN: 960 mL / OUT: 2700 mL / NET: -1740 mL                              8.2    7.42  )-----------( 501      ( 26 Aug 2022 06:12 )             26.7     08-26    133<L>  |  94<L>  |  83<H>  ----------------------------<  168<H>  5.0   |  23  |  4.83<H>    Ca    9.2      26 Aug 2022 06:16  Phos  5.3     08-26  Mg     2.0     08-26    TPro  6.7  /  Alb  2.7<L>  /  TBili  0.3  /  DBili  0.2  /  AST  14  /  ALT  17  /  AlkPhos  317<H>  08-26          REVIEW OF SYSTEMS  --------------------------------------------------------------------------------  unable to obtain full accurate ROS, but denies CP, SOB, HA, dizziness, N/V/D/C, abd discomfort.     PHYSICAL EXAM:  Constitutional: awake, weak  Eyes: Anicteric  ENMT: nc/at  Respiratory: not tachypneic, non-labored on RA    Cardiovascular: normotensive, regular rate on monitor   Gastrointestinal: Soft, non distended, nontender, RLQ incisional/groin  and right flank with significant induration   Genitourinary: anuric on HD   Extremities: SCD's in place and working bilaterally, overall with improving edema  Vascular: Palpable dp pulses bilaterally  Neurological: AAOx3  Skin:  As noted above. No ulcerations   Musculoskeletal: Moving all extremities  Psychiatric: overall calm, following directions

## 2022-08-26 NOTE — PROGRESS NOTE ADULT - SUBJECTIVE AND OBJECTIVE BOX
DIABETES FOLLOW UP NOTE: Saw pt earlier today    Chief Complaint: Endocrine consult requested for management of T2DM    INTERVAL HX: Pt stable, reports tolerating POs with BG levels variable depending on PO intake. Noted that pt is requiring total of 10 to 11 units of Admelog with meals. C/o having sacral pain due to skin break. Asking for pain meds. No CP/SOB reported. Tolerating HD. On stable Prednisone 10mg daily       Review of Systems:  General: As above  Cardiovascular: No chest pain, palpitations  Respiratory: No SOB, no cough  GI: No nausea, vomiting, abdominal pain  Endocrine: No S&Sx of hypoglycemia    Allergies    No Known Allergies    Intolerances      MEDICATIONS;  cephalexin 500 milliGRAM(s) Oral every 12 hours  insulin glargine Injectable (LANTUS) 7 Unit(s) SubCutaneous at bedtime  insulin lispro (ADMELOG) corrective regimen sliding scale   SubCutaneous three times a day before meals  insulin lispro (ADMELOG) corrective regimen sliding scale   SubCutaneous at bedtime  insulin lispro Injectable (ADMELOG) 9 Unit(s) SubCutaneous three times a day before meals  levothyroxine 50 MICROGram(s) Oral daily  predniSONE   Tablet 10 milliGRAM(s) Oral daily  sirolimus 6 milliGRAM(s) Oral <User Schedule>  trimethoprim   80 mG/sulfamethoxazole 400 mG 1 Tablet(s) Oral daily  valGANciclovir 450 milliGRAM(s) Oral <User Schedule>      PHYSICAL EXAM:  VITALS: T(C): 36.7 (08-26-22 @ 16:38)  T(F): 98 (08-26-22 @ 16:38), Max: 99.3 (08-26-22 @ 01:00)  HR: 71 (08-26-22 @ 16:38) (71 - 85)  BP: 163/72 (08-26-22 @ 16:38) (116/54 - 170/71)  RR:  (18 - 20)  SpO2:  (98% - 100%)  Wt(kg): --  GENERAL: Thin male sitting in chair> appears uncomfortable sitting side ways  Abdomen: Soft, nontender, non distended  Extremities: Warm, no edema in all 4 exts  NEURO: A&O X3    LABS:  POCT Blood Glucose.: 194 mg/dL (08-26-22 @ 13:27)  POCT Blood Glucose.: 192 mg/dL (08-26-22 @ 12:22)  POCT Blood Glucose.: 179 mg/dL (08-26-22 @ 08:10)  POCT Blood Glucose.: 241 mg/dL (08-25-22 @ 22:29)  POCT Blood Glucose.: 202 mg/dL (08-25-22 @ 17:49)  POCT Blood Glucose.: 117 mg/dL (08-25-22 @ 13:20)  POCT Blood Glucose.: 131 mg/dL (08-25-22 @ 12:11)  POCT Blood Glucose.: 156 mg/dL (08-25-22 @ 08:26)  POCT Blood Glucose.: 97 mg/dL (08-24-22 @ 21:11)  POCT Blood Glucose.: 120 mg/dL (08-24-22 @ 17:20)  POCT Blood Glucose.: 158 mg/dL (08-24-22 @ 12:35)  POCT Blood Glucose.: 90 mg/dL (08-24-22 @ 08:28)  POCT Blood Glucose.: 119 mg/dL (08-23-22 @ 21:18)  POCT Blood Glucose.: 153 mg/dL (08-23-22 @ 17:23)                            8.2    7.42  )-----------( 501      ( 26 Aug 2022 06:12 )             26.7       08-26    133<L>  |  94<L>  |  83<H>  ----------------------------<  168<H>  5.0   |  23  |  4.83<H>    eGFR: 12<L>    Ca    9.2      08-26  Mg     2.0     08-26  Phos  5.3     08-26    TPro  6.7  /  Alb  2.7<L>  /  TBili  0.3  /  DBili  0.2  /  AST  14  /  ALT  17  /  AlkPhos  317<H>  08-26        A1C with Estimated Average Glucose Result: 6.0 % (06-30-22 @ 05:49)      Estimated Average Glucose: 126 mg/dL (06-30-22 @ 05:49)        07-27 Chol -- Direct LDL -- LDL calculated -- HDL -- Trig 118

## 2022-08-26 NOTE — PROGRESS NOTE ADULT - ASSESSMENT
66 y/  M with Type 2 DM> unknown control due to skewed A1C 6.6% secondary to chronic anemia and s/p blood tx. On basal/bolus insulin therapy PTA. DM c/b ESRD s/p DDRT on 3/21, CVA, CAD s/p CABG, Afib on apixaban, seizure activity, HTN, HLD, h/o GI bleed in 2/2022 EGD with duodenal ulcer, discharged to [facility] rehab center after prior hospitalization, now re-admitted from [facility] for seizures c/f status epilepticus in setting of UTI. Endocrine consulted for steroid induced hyperglycemia. Prolonged hospital course w/ ICU stay, previously intubated/extubated, previous seizures. S/p ureteral stent removal 7/12/22. 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 Remains on steroid.  BG values improving with prandial BG going up so will increase premeal insulin to BG goal (100-180mg/dl).

## 2022-08-26 NOTE — PROGRESS NOTE ADULT - PROBLEM SELECTOR PLAN 1
-test BG AC/HS  -c/w Lantus 7 units QHS  -Increase Admelog to 10 units AC meals (hold if not eating)  -c/w Admelog low correction scale AC and low HS scale  -notify endocrine team if steroids changed as insulin regimen will need adjustment  Discharge planning:   - plan to rehab, likely will need basal bolus insulin final doses tbd  Outpatient f/u with Endocrinologist Dr. Lincoln. [address]. Phone  Needs apt at time of discharge  -Needs optho/renal/cardiac f/u as out pt  -Make sure pt has insulin and DM supplies at time of discharge.

## 2022-08-26 NOTE — PROGRESS NOTE ADULT - PROBLEM SELECTOR PLAN 3
- Continue with LT4 50 mcg daily   - Please make sure LT4 is given on an empty stomach at least one hour apart from other meds and food to ensure med absorption. DO NOT GIVE WITH VITAMINS/ANTACIDS  - If unable to ensure proper time administration switching to IV dosing in order to ensure med absorption. Synthroid 37.5mcg IV   Monitor TFTs outpatient. TSH can be skewed when critically ill.

## 2022-08-26 NOTE — PROGRESS NOTE ADULT - TIME BILLING
DDRT from DCD donor on 4/21/22  with Grade 2a rejection (biopsy on 7/29)  some glomerulitis and very minimal peritubular capillaritis, but his C4d is negative. No  DSA.   s/p pulse dose steroids. remains oliguric and dialysis dependent. Noted small quantities of urine  IS meds- on Sirolimus , target trough 4-6 ng/ml  mg po bid and prednisone. Has had Belatacept 3 weeks ago. Will hold off further dose  AMS , seizures - Controlled. Improved mental status, communicative   DM -  on Lantus and lispro. Adjusted for optimized glycemic control, monitor PO intake  HTN- Improved control, hemodialysis with fluid removal today  Cellulitis/ abdominal wall edema over RLQ - Skin bx done on 8/8/22 noted findings. ID follow up noted.   Noted ultrasound done on 8/22/22 and on 8/25/22  Physical therapy  I have seen the patient and reviewed dialysis prescription . Dialysis access noted.  Dialysis prescription has been adjusted for optimized control of volume status. Management of additional metabolic abnormalities/anemia will continue to be addressed on follow up.  Will transfuse one unit of PRBC    I was present during and reviewed clinical and lab data as well as assessment and plan as documented . Please contact if any additional questions with any change in clinical condition or on availability of any additional information or reports.  Plan of care d/w house staff, dialysis team and transplant team. DDRT from DCD donor on 4/21/22  with Grade 2a rejection (biopsy on 7/29)  some glomerulitis and very minimal peritubular capillaritis, but his C4d is negative. No  DSA.   s/p pulse dose steroids. remains oliguric and dialysis dependent. Noted small quantities of urine  IS meds- on Sirolimus , target trough 4-6 ng/ml  and prednisone. Has had Belatacept 3.5 weeks ago. Will hold off further dose  AMS , seizures - Controlled. Improved mental status, communicative   DM -  on Lantus and lispro. Adjusted for optimized glycemic control, monitor PO intake  HTN- Improved control, hemodialysis with fluid removal today  Cellulitis/ abdominal wall edema over RLQ - Skin bx done on 8/8/22 noted findings. ID follow up noted.   Noted ultrasound done on 8/22/22 and on 8/25/22, Has persistent tenderness over the indurated area, On Keflex  Physical therapy  I have seen the patient and reviewed dialysis prescription . Dialysis access noted.  Dialysis prescription has been adjusted for optimized control of volume status. Management of additional metabolic abnormalities/anemia will continue to be addressed on follow up.  Will transfuse one unit of PRBC  today with hemodialysis  I was present during and reviewed clinical and lab data as well as assessment and plan as documented . Please contact if any additional questions with any change in clinical condition or on availability of any additional information or reports.  Change dialysis catheter to tunneled catheter, planned for next week Tuesday  Plan of care d/w house staff, dialysis team and transplant team.

## 2022-08-26 NOTE — PROGRESS NOTE ADULT - NSPROGADDITIONALINFOA_GEN_ALL_CORE
-Plan discussed with pt/team.  Contact info: [phone] (24/7). pager [phone]  [email or web address] password [email or web address]  Spent 28 minutes assessing pt/labs/meds and discussing plan of care with primary team  Adjusting insulin  Discharge plan  Follow up care -Plan discussed with pt/team. F/u sacral skin break> spoke to team and RN  Contact info: [phone] (24/7). pager [phone]  [email or web address] password [email or web address]  Spent 28 minutes assessing pt/labs/meds and discussing plan of care with primary team  Adjusting insulin  Discharge plan  Follow up care

## 2022-08-26 NOTE — PROGRESS NOTE ADULT - NUTRITIONAL ASSESSMENT
Diet, Consistent Carbohydrate w/Evening Snack:   1000mL Fluid Restriction ([id number])  Supplement Feeding Modality:  Oral  Nepro Cans or Servings Per Day:  3       Frequency:  Daily (08-19-22 @ 17:52) [Active]      Please see RD assessment and/or follow up.  Managed by primary team as well

## 2022-08-26 NOTE — PROGRESS NOTE ADULT - ASSESSMENT
66M with ESRD s/p PermCath removal 5/10/22 s/p Rt DDRT 4/21/22,  CVA (2019), Afib on apixaban, seizure activity, CAD s/p stents, CABG (2020), HTN, HLD, DM on insulin, gastric/duodenal ulcer, recent hospitalization 4/28/22 -5/10/22 with weakness/anemia found to have perinephric hematoma requiring evacuation and repair of bleeding arterial anastomosis admitted 6/10 for status epilepticus requiring intubation for hypoxic respiratory failure.     s/p LP which was not suggestive of infectious process  U/A (7/12) 161 WBC  UCx (7/13) 50-99K ESBL Klebsiella   s/p 3 days of Ertapenem  UA (7/25) with 40 WBC  UCx (7/25) 50-99k ESBL Klebsiella     RUQ (7/25) with hepatomegaly   CT c/a/p (7/25) with only small perinephric fluid collection, small volume ascites.  Doppler US R Kidney (7/27) with mild thickening of collecting system and ureter and small   Doppler US R Kidney (7/29) with fullness of collecting system and continued urothelial thickening.     RVP (7/25) Negative  CMV PCR (7/22) Negative  EBV PCR (7/25) Negative   HBV PCR (7/25) Negative   HHV-6 PCR (7/25) POSITIVE   Parvovirus IgM and PCR (7/26) Negative   HSV 1/2 PCR (7/26) Negative   Adenovirus PCR (7/26) Negative    CT A/P (8/5) with No drainable fluid collections and Right psoas retroperitoneal hematoma.    Antibiotic Course:  Cephalexin: 8/20 -->  Vancomycin: 6/11, 6/26 --> 6/29, 7/25, 8/4 --> 8/23  Ertapenem: 6/14 --> 6/18, 6/22 --> 6/23, 7/15 --> 7/17, 7/28 --> 8/3  Meropenem: 6/26 --> 6/29, 7/25 --> 7/28  Fluconazole: 6/11 --> 6/21, 6/26 --> 7/6, 7/26 --> 8/10    #Rash, Erythema overlying Renal Transplant, Leukocytosis, Fever  Developed after 10 days of Carbapenem Therapy  No significant improvement after Vancomycin initiation  Concern for atypical infectious etiology (or noninfectious process)  Dermatology Surgical Path (8/8) nonspecific findings including edema, telangiectasia and sparse mixed infiltrate of lymphocytes and neutrophils, culture prelim negative, AFB negative.   Findings are nonspecific ?Cellulitis  --If continued erythema/pain at abdominal wall would consider biopsy of abdominal wall muscle for additional culture yield  --Follow up on Dermatology Fungal and AFB Cultures    #Positive Blood Culture  BCx (8/8) with Coagulase negative staph 1 out of 4   BCx (8/9) NGTD  Suspect Coagulase negative staph is a contaminant  --Follow up on prelim cultures    #ESRD s/p DDRT (4/21/2022) (CMV +/+, EBV +/+)  --Continue Bactrim SS PO Q24H for PCP PPx  --Continue Valcyte 450 mg PO Mon/Wed/Fri for CMV PPx    I will be away over this upcoming weekend. Please contact the Infectious Diseases Office with any further questions or concerns.     Carlton Hoover M.D.  [facility] Division of Infectious Disease  8AM-5PM Monday - Friday: Available on [employer] Teams  After Hours and Holidays (or if no response on [employer] Teams): Please contact the Infectious Diseases Office at [phone]

## 2022-08-26 NOTE — PROGRESS NOTE ADULT - SUBJECTIVE AND OBJECTIVE BOX
[facility] [place] [facility]   MD NINA MASON MD RUORU WONG, PA    TEL:  FROM 9 AM to 5 PM ---OFFICE: [phone]    FROM 5 PM - 9 AM PLEASE CALL ANSWERING SERVICE: [phone]    RENAL FOLLOW UP NOTE--Date of Service 08-26-22 @ 09:39  --------------------------------------------------------------------------------  HPI:      Pt seen and examined at bedside.       PAST HISTORY  --------------------------------------------------------------------------------  No significant changes to PMH, PSH, FHx, SHx, unless otherwise noted    ALLERGIES & MEDICATIONS  --------------------------------------------------------------------------------  Allergies    No Known Allergies    Intolerances      Standing Inpatient Medications  amLODIPine   Tablet 10 milliGRAM(s) Oral daily  brivaracetam 50 milliGRAM(s) Oral two times a day  cephalexin 500 milliGRAM(s) Oral every 12 hours  chlorhexidine 2% Cloths 1 Application(s) Topical <User Schedule>  doxazosin 4 milliGRAM(s) Oral <User Schedule>  epoetin cortney-epbx (RETACRIT) Injectable 22385 Unit(s) SubCutaneous <User Schedule>  finasteride 5 milliGRAM(s) Oral daily  heparin   Injectable 5000 Unit(s) SubCutaneous every 12 hours  hydrALAZINE 75 milliGRAM(s) Oral three times a day  insulin glargine Injectable (LANTUS) 7 Unit(s) SubCutaneous at bedtime  insulin lispro (ADMELOG) corrective regimen sliding scale   SubCutaneous three times a day before meals  insulin lispro (ADMELOG) corrective regimen sliding scale   SubCutaneous at bedtime  insulin lispro Injectable (ADMELOG) 9 Unit(s) SubCutaneous three times a day before meals  levothyroxine 50 MICROGram(s) Oral daily  melatonin 6 milliGRAM(s) Oral at bedtime  nystatin    Suspension 767357 Unit(s) Oral four times a day  pantoprazole    Tablet 40 milliGRAM(s) Oral <User Schedule>  phenytoin   Capsule 200 milliGRAM(s) Oral two times a day  polyethylene glycol 3350 17 Gram(s) Oral daily  predniSONE   Tablet 10 milliGRAM(s) Oral daily  senna 2 Tablet(s) Oral at bedtime  sirolimus 6 milliGRAM(s) Oral <User Schedule>  trimethoprim   80 mG/sulfamethoxazole 400 mG 1 Tablet(s) Oral daily  valGANciclovir 450 milliGRAM(s) Oral <User Schedule>    PRN Inpatient Medications  acetaminophen     Tablet .. 650 milliGRAM(s) Oral every 6 hours PRN  diphenhydrAMINE 25 milliGRAM(s) Oral at bedtime PRN      REVIEW OF SYSTEMS  --------------------------------------------------------------------------------  General: no fever  MSK: no edema     VITALS/PHYSICAL EXAM  --------------------------------------------------------------------------------  T(C): 36.6 (08-26-22 @ 09:23), Max: 37.4 (08-26-22 @ 01:00)  HR: 76 (08-26-22 @ 09:23) (68 - 85)  BP: 145/67 (08-26-22 @ 09:23) (128/56 - 170/71)  RR: 20 (08-26-22 @ 09:23) (18 - 20)  SpO2: 98% (08-26-22 @ 09:23) (98% - 100%)  Wt(kg): --        08-25-22 @ 07:01  -  08-26-22 @ 07:00  --------------------------------------------------------  IN: 960 mL / OUT: 2700 mL / NET: -1740 mL      Physical Exam:  	Gen: NAD  	HEENT: MMM  	Pulm: CTA B/L  	CV: S1S2  	Abd: Soft, +BS  	Ext: No LE edema B/L                      Neuro: Awake   	Skin: Warm and Dry   	Vascular access:  HD catheter            no garrett  LABS/STUDIES  --------------------------------------------------------------------------------              8.2    7.42  >-----------<  501      [08-26-22 @ 06:12]              26.7     133  |  94  |  83  ----------------------------<  168      [08-26-22 @ 06:16]  5.0   |  23  |  4.83        Ca     9.2     [08-26-22 @ 06:16]      Mg     2.0     [08-26-22 @ 06:16]      Phos  5.3     [08-26-22 @ 06:16]    TPro  6.7  /  Alb  2.7  /  TBili  0.3  /  DBili  0.2  /  AST  14  /  ALT  17  /  AlkPhos  317  [08-26-22 @ 06:16]      PTT: 31.8       [08-26-22 @ 06:12]      Creatinine Trend:  SCr 4.83 [08-26 @ 06:16]  SCr 3.63 [08-25 @ 06:55]  SCr 4.84 [08-24 @ 06:46]  SCr 3.88 [08-23 @ 06:47]  SCr 5.79 [08-22 @ 06:56]    Urinalysis - [08-16-22 @ 18:49]      Color Yellow / Appearance Slightly Turbid / SG 1.018 / pH 8.5      Gluc 200 mg/dL / Ketone Negative  / Bili Negative / Urobili Negative       Blood Large / Protein >600 / Leuk Est Moderate / Nitrite Positive      RBC 13 / WBC 40 / Hyaline 7 / Gran  / Sq Epi  / Non Sq Epi 4 / Bacteria Moderate      Iron 17, TIBC 171, %sat 10      [05-02-22 @ 13:03]  Ferritin 6336      [07-27-22 @ 13:00]  PTH -- (Ca 9.2)      [02-05-22 @ 10:13]   294  HbA1c 6.0      [02-21-20 @ 08:53]  TSH 4.69      [07-06-22 @ 18:36]  Lipid: chol --, , HDL --, LDL --      [07-27-22 @ 13:00]

## 2022-08-26 NOTE — PROGRESS NOTE ADULT - PROBLEM SELECTOR PLAN 1
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 No DSA.   s/p pulse dose steroids. Thymo was not given to treat the rejection as he is too frail and at increased risk for infections. Now with failed allograft function, Remains anuric and is dialysis dependent. Pt underwent IR guided HD catheter exchange on 8/8/22. Last HD done on 8/24/22 and UF done on 8/25/22. Tolerated well.   Labs reviewed. Pt. is still anuric. Plan for HD today. Will transfuse 1 unit PRBC with UF today. IR notes regarding tunneled HD catheter placement reviewed from 8/24/22. Monitor for labs. Dose meds as per HD. 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 No DSA.   s/p pulse dose steroids. Thymo was not given to treat the rejection as he is too frail and at increased risk for infections. Now with failed allograft function, Remains anuric and is dialysis dependent. Pt underwent IR guided HD catheter exchange on 8/8/22. Last HD done on 8/24/22 and UF done on 8/25/22. Tolerated well.   Labs reviewed. Pt. is still anuric. Plan for HD today. Will transfuse 1 unit PRBC  today. IR notes regarding tunneled HD catheter placement reviewed from 8/24/22. Monitor for labs. Dose meds as per HD.

## 2022-08-26 NOTE — PROGRESS NOTE ADULT - ASSESSMENT
66M with h/o DM type II, HTN, CAD s/p CABG in 2020, Afib on Eliquis, CVA in 2019 due to Afib, Seizure d/o (last episode was on 4/8/22), h/o GI bleed in 2/2022 EGD with duodenal ulcer and ESRD on HD s/p R DDRT from DCD donor on 4/21/22 complicated by DGF requiring HD until 4/29/22 who presented with status epilepticus in setting of UTI/antibiotics and sub-therapeutic valproic acid level. Transferred to SICU on 7/25 with signs of sepsis, now with refractory seizures    Seizure Disorder:  - Neuro/Epilepsy Teams following. Avoid Fycompa 2/2 agitation in past  - 1 min seizure with clonic movements noted on EEG 8/16.   - Continue additional anti-seizure medication Briviact 50mg BID with additional 50mg post each HD session  - No further seizures noted.  EEG 8/16-8/18  - prior MRI head 6/27 with less concerns for PRES, likely ischemic changes  - o/n CTH 8/3 with no acute findings  - will follow up with neuro regarding seizure medications recs      R DCD DDRT 4/21/22 c/b 2A ACR  - 2A ACR: s/p pulse steroids. Continue Prednisone 10mg QD  - Deferring right psoas muscle/fascia biopsy as wound is not worsening  - HD/UF as per nephrology for fluid overload/anuria, HD today   - 8/14 surveillance BCx negative  - graft doppler reviewed last 8/26  - S/P removal of ureteral stent on 7/12  - Continue Doxazosin/Proscar for BPH  - OOB with RN/PT  - Renal Soft diet as tolerated with PO supplements  - 8/8 Punch biopsy wound Culture: ngtd. Negative for PTLD  - Continue Keflex for cellulitis (erythema around abd wound).   - DVT ppx with SQH  - fungitel negative  - LE studies WNL  - Benadryl PRN for sleep  - Daily CRP      Immunosuppression:   - Belatacept: 6/23-8/1 discontinued  - on Sirolimus based on level, Cellcept 250 BID, Pred 20mg QD  - PPX:  Nystatin, bactrim, valcyte (MWF)    DM  - BG improved on Lantus 7U. Continue pre-meal 9U and ISS.   - Fingerstick ac and qhs  - Endo following    AFib/R IJ DVT   - Eliquis with ~40% less efficacy while on fosphenytoin.    - Lovenox held for ANA and transaminitis   - rate controlled  - will discuss A/C plans daily    HTN:  - Amlodipine/Doxazosin/Hydralazline  - off Coreg due to bradycardia

## 2022-08-27 NOTE — PROGRESS NOTE ADULT - ASSESSMENT
66M with h/o DM type II, HTN, CAD s/p CABG in 2020, Afib on Eliquis, CVA in 2019 due to Afib, Seizure d/o (last episode was on 4/8/22), h/o GI bleed in 2/2022 EGD with duodenal ulcer and ESRD on HD s/p R DDRT from DCD donor on 4/21/22 complicated by DGF requiring HD until 4/29/22 who presented with status epilepticus in setting of UTI/antibiotics and sub-therapeutic valproic acid level. Transferred to SICU on 7/25 with signs of sepsis, now with refractory seizures    Seizure Disorder:  - Neuro/Epilepsy Teams following. Avoid Fycompa 2/2 agitation in past  - 1 min seizure with clonic movements noted on EEG 8/16.   - Continue additional anti-seizure medication Briviact 50mg BID with additional 50mg post each HD session  - No further seizures noted.  EEG 8/16-8/18  - prior MRI head 6/27 with less concerns for PRES, likely ischemic changes  - o/n CTH 8/3 with no acute findings  - will follow up with neuro regarding seizure medications recs      R DCD DDRT 4/21/22 c/b 2A ACR  - 2A ACR: s/p pulse steroids. Continue Prednisone 10mg QD  - Deferring right psoas muscle/fascia biopsy as wound is not worsening  - HD/UF as per nephrology for fluid overload/anuria, HD on 8/26 w/ 1 pRBC during HD  - 8/14 surveillance BCx negative  - Repeated US from 8/25 did not show any renal artery stenosis, but increased resistive index, consistent with parynchyma disease was noted  - S/P removal of ureteral stent on 7/12  - Continue Doxazosin/Proscar for BPH  - OOB with RN/PT  - Renal Soft diet as tolerated with PO supplements  - 8/8 Punch biopsy wound Culture: ngtd. Negative for PTLD  - Continue Keflex for cellulitis (erythema around abd wound).   - DVT ppx with SQH  - fungitel negative  - LE studies WNL  - Benadryl PRN for sleep  - Daily CRP      Immunosuppression:   - Belatacept: 6/23-8/1 discontinued  - on Sirolimus based on level, Cellcept 250 BID, Pred 20mg QD  - PPX:  Nystatin, bactrim, valcyte (MWF)    DM  - BG improved on Lantus 7U. Continue pre-meal 9U and ISS.   - Fingerstick ac and qhs  - Endo following    AFib/R IJ DVT   - Eliquis with ~40% less efficacy while on fosphenytoin.    - Lovenox held for ANA and transaminitis   - rate controlled  - will discuss A/C plans daily    HTN:  - Amlodipine/Doxazosin/Hydralazline  - off Coreg due to bradycardia

## 2022-08-27 NOTE — PROGRESS NOTE ADULT - NUTRITIONAL ASSESSMENT
This patient has been assessed with a concern for Malnutrition and has been determined to have a diagnosis/diagnoses of Severe protein-calorie malnutrition.    This patient is being managed with:   Diet Consistent Carbohydrate w/Evening Snack-  1000mL Fluid Restriction ([id number])  Supplement Feeding Modality:  Oral  Nepro Cans or Servings Per Day:  3       Frequency:  Daily  Entered: Aug 19 2022  5:52PM

## 2022-08-27 NOTE — PROGRESS NOTE ADULT - SUBJECTIVE AND OBJECTIVE BOX
[facility] DIVISION OF KIDNEY DISEASES AND HYPERTENSION -- FOLLOW UP NOTE  --------------------------------------------------------------------------------  Authored by: Ozzie Nova   Cell # [phone]     CHAD DUNBAR was seen and examined at bedside.   Patient is a 66y old  Male who presents with a chief complaint of Status Epilepticus (08-27-22)    He has h/o DM type II, HTN, CAD s/p CABG in 2020, Afib on Eliquis, CVA in 2019 due to Afib, Hypothyroidism, Seizure d/o, h/o GI bleed in 2/2022 EGD with duodenal ulcer and ESRD on HD s/p R DDRT from DCD donor on 4/21/22 complicated by DGF requiring HD until 4/29/22, later had good graft function who was readmitted with status epilepticus in setting of UTI/antibiotics and sub-therapeutic valproic acid level.  Envarsus was discontinued for seizures and poor mental status and he was started on belatacept. Subsequent ANA due to rejection. 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 Received pulse steroids (Solumedrol 500 on 7/30 -> 250 on 7/31). Did not receive thymo due to frailty.  Currently HD dependent    24 hour events/subjective: No major events. Slept poorly.  Tolerated HD yesterday. Transfused 1 unit.    Voiding minimal urine 50-100ml. No fever. Appetite ok.     Standing Inpatient Medications  amLODIPine   Tablet 10 milliGRAM(s) Oral daily  brivaracetam 50 milliGRAM(s) Oral two times a day  cephalexin 500 milliGRAM(s) Oral every 12 hours  doxazosin 4 milliGRAM(s) Oral <User Schedule>  epoetin cortney-epbx (RETACRIT) Injectable 57043 Unit(s) SubCutaneous <User Schedule>  finasteride 5 milliGRAM(s) Oral daily  heparin   Injectable 5000 Unit(s) SubCutaneous every 12 hours  hydrALAZINE 75 milliGRAM(s) Oral three times a day  insulin glargine Injectable (LANTUS) 7 Unit(s) SubCutaneous at bedtime  insulin lispro (ADMELOG) corrective regimen sliding scale   SubCutaneous three times a day before meals  insulin lispro (ADMELOG) corrective regimen sliding scale   SubCutaneous at bedtime  insulin lispro Injectable (ADMELOG) 9 Unit(s) SubCutaneous three times a day before meals  levothyroxine 50 MICROGram(s) Oral daily  melatonin 6 milliGRAM(s) Oral at bedtime  multivitamin 1 Tablet(s) Oral daily  nystatin    Suspension 082774 Unit(s) Oral four times a day  pantoprazole    Tablet 40 milliGRAM(s) Oral <User Schedule>  phenytoin   Capsule 200 milliGRAM(s) Oral two times a day  polyethylene glycol 3350 17 Gram(s) Oral daily  predniSONE   Tablet 10 milliGRAM(s) Oral daily  senna 2 Tablet(s) Oral at bedtime  sirolimus 6 milliGRAM(s) Oral <User Schedule>  trimethoprim   80 mG/sulfamethoxazole 400 mG 1 Tablet(s) Oral daily  valGANciclovir 450 milliGRAM(s) Oral <User Schedule>    PRN Inpatient Medications  acetaminophen     Tablet .. 650 milliGRAM(s) Oral every 6 hours PRN  diphenhydrAMINE 25 milliGRAM(s) Oral at bedtime PRN        VITALS/PHYSICAL EXAM  --------------------------------------------------------------------------------  T(C): 36.6 (08-27-22 @ 13:49), Max: 36.9 (08-27-22 @ 05:00)  HR: 69 (08-27-22 @ 13:49) (69 - 85)  BP: 133/70 (08-27-22 @ 13:49) (133/70 - 181/69)  RR: 20 (08-27-22 @ 13:49) (16 - 20)  SpO2: 99% (08-27-22 @ 13:49) (97% - 100%)    08-26-22 @ 07:01  -  08-27-22 @ 07:00  --------------------------------------------------------  IN: 1420 mL / OUT: 3100 mL / NET: -1680 mL      Physical Exam:  Awake and alert  Sitting comfortably in chair   No sighs of distress  Thin and frail  Abdomen soft and non tender  No LE edema  Left IJ dialysis cathter    LABS/STUDIES  --------------------------------------------------------------------------------              8.5    7.16  >-----------<  465      [08-27-22 @ 06:36]              27.6     132  |  93  |  55  ----------------------------<  199      [08-27-22 @ 06:40]  4.6   |  25  |  3.59        Ca     9.0     [08-27-22 @ 06:40]      Mg     1.9     [08-27-22 @ 06:40]      Phos  3.6     [08-27-22 @ 06:40]    TPro  6.7  /  Alb  2.7  /  TBili  0.3  /  DBili  0.2  /  AST  16  /  ALT  19  /  AlkPhos  330  [08-27-22 @ 06:40]      PTT: 31.9       [08-27-22 @ 06:38]      Creatinine Trend:  SCr 3.59 [08-27 @ 06:40]  SCr 4.83 [08-26 @ 06:16]  SCr 3.63 [08-25 @ 06:55]  SCr 4.84 [08-24 @ 06:46]  SCr 3.88 [08-23 @ 06:47]        Sirolimus (Rapamycin) Level, Serum: 5.1 ng/mL (08-27 @ 06:36)  Sirolimus (Rapamycin) Level, Serum: 4.6 ng/mL (08-26 @ 06:16)  Sirolimus (Rapamycin) Level, Serum: 4.2 ng/mL (08-25 @ 06:51)  Sirolimus (Rapamycin) Level, Serum: 5.4 ng/mL (08-24 @ 06:46)          CAPILLARY BLOOD GLUCOSE  POCT Blood Glucose.: 140 mg/dL (27 Aug 2022 12:50)  POCT Blood Glucose.: 198 mg/dL (27 Aug 2022 08:14)  POCT Blood Glucose.: 121 mg/dL (26 Aug 2022 21:23)  POCT Blood Glucose.: 102 mg/dL (26 Aug 2022 17:11)      Urinalysis - [08-16-22 @ 18:49]      Color Yellow / Appearance Slightly Turbid / SG 1.018 / pH 8.5      Gluc 200 mg/dL / Ketone Negative  / Bili Negative / Urobili Negative       Blood Large / Protein >600 / Leuk Est Moderate / Nitrite Positive      RBC 13 / WBC 40 / Hyaline 7 / Gran  / Sq Epi  / Non Sq Epi 4 / Bacteria Moderate      Iron 17, TIBC 171, %sat 10      [05-02-22 @ 13:03]  Ferritin 6336      [07-27-22 @ 13:00]  PTH -- (Ca 9.2)      [02-05-22 @ 10:13]   294  HbA1c 6.0      [02-21-20 @ 08:53]  TSH 4.69      [07-06-22 @ 18:36]  Lipid: chol --, , HDL --, LDL --      [07-27-22 @ 13:00]

## 2022-08-27 NOTE — PROGRESS NOTE ADULT - SUBJECTIVE AND OBJECTIVE BOX
Transplant Surgery - Multidisciplinary Rounds  --------------------------------------------------------------  DDRT  Date: 4/21/2022    HPI: 66M with PMH: DM type II, HTN, CAD s/p CABG in 2020, AFib on Eliquis, CVA in 2019 due to Afib, Seizure d/o following CVA, last episode was on 4/8/22, h/o GIB in 2/2022 EGD with duodenal ulcer.  ESRD due to DM was on HD since 2019.     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  Donor was 58 , KDPI 82%, DCD, single vessels and ureter, HLA mismatch 1, 2, 2. No DSA, cPRA 0%. 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 Re-admitted in May for anemia in the setting of large perinephric hematoma s/p evacuation and repair of arterial anastomosis on 5/2/22. Intra operative biopsy showed no rejection, Creatinine ranging ~2mg/dL.    In Rehab:  He was recently dx with klebsiella UTI with Ertapenem - then switched to Levaquin    He also had parainfluenza pneumonia. Was at rehab progressing well.      Hospital course:  - 6/10: transferred from rehab facility for seizure status epilepticus. Intubated for respiratory protection and transferred to MICU.  EEG showed no seizure activity. Neuro consulted.   - 6/11: Extubated. Found to have R pleural effusion. s/p Thoracentesis 1.3L. Eliquis changed to heparin drip.  Post procedure drop in H&H. 5u PRBC, 2 u FFP.   - 6/11 evening: Transferred to SICU from MICU for further care in setting of hypoxia, significant R pleural effusion, anemia and hemodynamic instability  - 6/11 Intubated at 9pm and sedated on Precedex.  CT placed, 600ml blood drained.  1L total overnight, started pressors  - 6/11 Received Protamine for PTT >100 (was on Heparin drip started for AF), 2 u FFP and 5u PRBC total  - 6/13 s/p VATS 2.2L old blood removed; second chest tube placed  - 6/18-19: chest tubes removed  - 6/21: ?PRES vs. chronic ischemic changes on MRI, off Envarsus, switched to Belatacept 6/23 with good graft function  - 6/25: Tx to SICU for refractory seizures, improved with IV antiepileptic regimen  - 7/10: Downgraded from SICU to floor.   - 7/11: OR-->URETERAL STENT REMOVED  - 7/15: ESBL Kleb UTI (50-90K), completed Ertapenem x 3 Days  - 7/25: Tx to SICU for Sepsis/ elevated LFTS  - 7/27: Shiley placed for HD  - 7/28: ongoing fevers -> started Ertapenem/Fluc  - 7/29: HD restarted + 1U PRBC.  IR bx with 2A rejection, started pulse steroids. LFTs have improved  - 8/4: refractory seizures  - 8/8: s/p derm bx with no acute finding  - 8/8: s/p permacath exchange  - 8/16: One seizure on EEG medications changed added brivaracetam  - 8/22: HD, followed by U/S of Transplant kidney    Present:   Patient seen and examined with multidisciplinary team including Transplant Surgeon: Dr. Maddox, Dr. Tena, Dr. Davis, Dr. Mann, Dr. David, Dr. Barber. Transplant Nephrologist: Dr. Alfred, Dr. Waqar Zavaleta, Dr. Mary Lomeli,   Pharmacist: Bryan Lmi, Danica Melo. ACPs Jose, Jensen, Jayda, Stevenson, Vilma, Tone and unit RN during am rounds.  Disciplines not in attendance will be notified of the plan.     Interval Events: 1 u pRBC yesterday during HD. No acute events overnight. Continues to complain of some pain in this RLQ.     Immunosupression:   Induction: Simulect                                               Maintenance immunosupression: Sirolimus 6 mg QD, prednisone 5   Ongoing monitoring for signs of rejection.    Potential Discharge date:    Education:  Medications    Plan of care:  See Below    MEDICATIONS  (STANDING):  amLODIPine   Tablet 10 milliGRAM(s) Oral daily  brivaracetam 50 milliGRAM(s) Oral two times a day  cephalexin 500 milliGRAM(s) Oral every 12 hours  chlorhexidine 2% Cloths 1 Application(s) Topical <User Schedule>  doxazosin 4 milliGRAM(s) Oral <User Schedule>  epoetin cortney-epbx (RETACRIT) Injectable 91978 Unit(s) SubCutaneous <User Schedule>  finasteride 5 milliGRAM(s) Oral daily  heparin   Injectable 5000 Unit(s) SubCutaneous every 12 hours  hydrALAZINE 75 milliGRAM(s) Oral three times a day  insulin glargine Injectable (LANTUS) 7 Unit(s) SubCutaneous at bedtime  insulin lispro (ADMELOG) corrective regimen sliding scale   SubCutaneous three times a day before meals  insulin lispro (ADMELOG) corrective regimen sliding scale   SubCutaneous at bedtime  insulin lispro Injectable (ADMELOG) 9 Unit(s) SubCutaneous three times a day before meals  levothyroxine 50 MICROGram(s) Oral daily  melatonin 6 milliGRAM(s) Oral at bedtime  multivitamin 1 Tablet(s) Oral daily  nystatin    Suspension 718366 Unit(s) Oral four times a day  pantoprazole    Tablet 40 milliGRAM(s) Oral <User Schedule>  phenytoin   Capsule 200 milliGRAM(s) Oral two times a day  polyethylene glycol 3350 17 Gram(s) Oral daily  predniSONE   Tablet 10 milliGRAM(s) Oral daily  senna 2 Tablet(s) Oral at bedtime  sirolimus 6 milliGRAM(s) Oral <User Schedule>  trimethoprim   80 mG/sulfamethoxazole 400 mG 1 Tablet(s) Oral daily  valGANciclovir 450 milliGRAM(s) Oral <User Schedule>    MEDICATIONS  (PRN):  acetaminophen     Tablet .. 650 milliGRAM(s) Oral every 6 hours PRN Mild Pain (1 - 3)  diphenhydrAMINE 25 milliGRAM(s) Oral at bedtime PRN Insomnia      PAST MEDICAL & SURGICAL HISTORY:  Hypertension      Diabetes      Dyslipidemia      CAD (Coronary Artery Disease)  with Stents in 06/2009 and 6/2019, s/p off-pump C3L on 8/13/20      Hypothyroidism      CVA (cerebral vascular accident)  12/13/19 with residual bilateral weakness      Anemia      PEG (percutaneous endoscopic gastrostomy) status  removed July 2020      Intubation of airway performed without difficulty  Dec 2019  CVA c/b status epilepticus requiring intubation and PEG in 12/2019-      ESRD on dialysis  M/W/F      Seizures  after CVA, last seizure was last week 4/07/22      History of insertion of stent into coronary artery bypass graft  2009 and 2019      S/P CABG x 3  off pump C3L on 8/13/20          Vital Signs Last 24 Hrs  T(C): 36.6 (27 Aug 2022 13:49), Max: 36.9 (27 Aug 2022 05:00)  T(F): 97.8 (27 Aug 2022 13:49), Max: 98.4 (27 Aug 2022 05:00)  HR: 69 (27 Aug 2022 13:49) (69 - 85)  BP: 133/70 (27 Aug 2022 13:49) (133/70 - 181/69)  BP(mean): 104 (26 Aug 2022 16:38) (104 - 104)  RR: 20 (27 Aug 2022 13:49) (16 - 20)  SpO2: 99% (27 Aug 2022 13:49) (97% - 100%)    Parameters below as of 27 Aug 2022 13:49  Patient On (Oxygen Delivery Method): room air        I&O's Summary    26 Aug 2022 07:01  -  27 Aug 2022 07:00  --------------------------------------------------------  IN: 1420 mL / OUT: 3100 mL / NET: -1680 mL                              8.5    7.16  )-----------( 465      ( 27 Aug 2022 06:36 )             27.6     08-27    132<L>  |  93<L>  |  55<H>  ----------------------------<  199<H>  4.6   |  25  |  3.59<H>    Ca    9.0      27 Aug 2022 06:40  Phos  3.6     08-27  Mg     1.9     08-27    TPro  6.7  /  Alb  2.7<L>  /  TBili  0.3  /  DBili  0.2  /  AST  16  /  ALT  19  /  AlkPhos  330<H>  08-27            Review of systems  Gen: No weight changes, fatigue, fevers/chills, weakness  Skin: No rashes  Head/Eyes/Ears/Mouth: No headache; Normal hearing; Normal vision w/o blurriness; No sinus pain/discomfort, sore throat  Respiratory: No dyspnea, cough, wheezing, hemoptysis  CV: No chest pain, PND, orthopnea  GI: Mild abdominal pain at surgical incision site and radiating to Rt flank; denies diarrhea, constipation, nausea, vomiting, melena, hematochezia  : No increased frequency, dysuria, hematuria, nocturia  MSK: No joint pain/swelling; no back pain; no edema  Neuro: No dizziness/lightheadedness, weakness, seizures, numbness, tingling  Heme: No easy bruising or bleeding  Endo: No heat/cold intolerance  Psych: No significant nervousness, anxiety, stress, depression  All other systems were reviewed and are negative, except as noted.      PHYSICAL EXAM:  Constitutional: Well developed / well nourished  Eyes: Anicteric  Respiratory: nonlabored   Cardiovascular: normotensive, regular rate   Gastrointestinal: Soft, non distended, mild tenderness at the incision site and Rt flank, incision well healed   Extremities: SCD's in place and working bilaterally  Neurological: awake, alert, answering questions appropriately   Skin: erythema and localized edema to RLQ and Rt flank with associated warmth.   Musculoskeletal: Moving all extremities  Psychiatric: Responsive     Transplant Surgery - Multidisciplinary Rounds  --------------------------------------------------------------  DDRT  Date: 4/21/2022    HPI: 66M with PMH: DM type II, HTN, CAD s/p CABG in 2020, AFib on Eliquis, CVA in 2019 due to Afib, Seizure d/o following CVA, last episode was on 4/8/22, h/o GIB in 2/2022 EGD with duodenal ulcer.  ESRD due to DM was on HD since 2019.     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  Donor was 58 , KDPI 82%, DCD, single vessels and ureter, HLA mismatch 1, 2, 2. No DSA, cPRA 0%. 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 Re-admitted in May for anemia in the setting of large perinephric hematoma s/p evacuation and repair of arterial anastomosis on 5/2/22. Intra operative biopsy showed no rejection, Creatinine ranging ~2mg/dL.    In Rehab:  He was recently dx with klebsiella UTI with Ertapenem - then switched to Levaquin    He also had parainfluenza pneumonia. Was at rehab progressing well.      Hospital course:  - 6/10: transferred from rehab facility for seizure status epilepticus. Intubated for respiratory protection and transferred to MICU.  EEG showed no seizure activity. Neuro consulted.   - 6/11: Extubated. Found to have R pleural effusion. s/p Thoracentesis 1.3L. Eliquis changed to heparin drip.  Post procedure drop in H&H. 5u PRBC, 2 u FFP.   - 6/11 evening: Transferred to SICU from MICU for further care in setting of hypoxia, significant R pleural effusion, anemia and hemodynamic instability  - 6/11 Intubated at 9pm and sedated on Precedex.  CT placed, 600ml blood drained.  1L total overnight, started pressors  - 6/11 Received Protamine for PTT >100 (was on Heparin drip started for AF), 2 u FFP and 5u PRBC total  - 6/13 s/p VATS 2.2L old blood removed; second chest tube placed  - 6/18-19: chest tubes removed  - 6/21: ?PRES vs. chronic ischemic changes on MRI, off Envarsus, switched to Belatacept 6/23 with good graft function  - 6/25: Tx to SICU for refractory seizures, improved with IV antiepileptic regimen  - 7/10: Downgraded from SICU to floor.   - 7/11: OR-->URETERAL STENT REMOVED  - 7/15: ESBL Kleb UTI (50-90K), completed Ertapenem x 3 Days  - 7/25: Tx to SICU for Sepsis/ elevated LFTS  - 7/27: Shiley placed for HD  - 7/28: ongoing fevers -> started Ertapenem/Fluc  - 7/29: HD restarted + 1U PRBC.  IR bx with 2A rejection, started pulse steroids. LFTs have improved  - 8/4: refractory seizures  - 8/8: s/p derm bx with no acute finding  - 8/8: s/p permacath exchange  - 8/16: One seizure on EEG medications changed added brivaracetam  - 8/22: HD, followed by U/S of Transplant kidney    Present:   Patient seen and examined with multidisciplinary team including Transplant Surgeon: Dr. Davis, Transplant Nephrologist: Dr. Nova, and unit RN during am rounds.  Disciplines not in attendance will be notified of the plan.     Interval Events: 1 u pRBC yesterday during HD. No acute events overnight. Continues to complain of some pain in this RLQ.     Immunosupression:   Induction: Simulect                                               Maintenance immunosupression: Sirolimus 6 mg QD, prednisone 5   Ongoing monitoring for signs of rejection.    Potential Discharge date:    Education:  Medications    Plan of care:  See Below    MEDICATIONS  (STANDING):  amLODIPine   Tablet 10 milliGRAM(s) Oral daily  brivaracetam 50 milliGRAM(s) Oral two times a day  cephalexin 500 milliGRAM(s) Oral every 12 hours  chlorhexidine 2% Cloths 1 Application(s) Topical <User Schedule>  doxazosin 4 milliGRAM(s) Oral <User Schedule>  epoetin cortney-epbx (RETACRIT) Injectable 94222 Unit(s) SubCutaneous <User Schedule>  finasteride 5 milliGRAM(s) Oral daily  heparin   Injectable 5000 Unit(s) SubCutaneous every 12 hours  hydrALAZINE 75 milliGRAM(s) Oral three times a day  insulin glargine Injectable (LANTUS) 7 Unit(s) SubCutaneous at bedtime  insulin lispro (ADMELOG) corrective regimen sliding scale   SubCutaneous three times a day before meals  insulin lispro (ADMELOG) corrective regimen sliding scale   SubCutaneous at bedtime  insulin lispro Injectable (ADMELOG) 9 Unit(s) SubCutaneous three times a day before meals  levothyroxine 50 MICROGram(s) Oral daily  melatonin 6 milliGRAM(s) Oral at bedtime  multivitamin 1 Tablet(s) Oral daily  nystatin    Suspension 246397 Unit(s) Oral four times a day  pantoprazole    Tablet 40 milliGRAM(s) Oral <User Schedule>  phenytoin   Capsule 200 milliGRAM(s) Oral two times a day  polyethylene glycol 3350 17 Gram(s) Oral daily  predniSONE   Tablet 10 milliGRAM(s) Oral daily  senna 2 Tablet(s) Oral at bedtime  sirolimus 6 milliGRAM(s) Oral <User Schedule>  trimethoprim   80 mG/sulfamethoxazole 400 mG 1 Tablet(s) Oral daily  valGANciclovir 450 milliGRAM(s) Oral <User Schedule>    MEDICATIONS  (PRN):  acetaminophen     Tablet .. 650 milliGRAM(s) Oral every 6 hours PRN Mild Pain (1 - 3)  diphenhydrAMINE 25 milliGRAM(s) Oral at bedtime PRN Insomnia      PAST MEDICAL & SURGICAL HISTORY:  Hypertension      Diabetes      Dyslipidemia      CAD (Coronary Artery Disease)  with Stents in 06/2009 and 6/2019, s/p off-pump C3L on 8/13/20      Hypothyroidism      CVA (cerebral vascular accident)  12/13/19 with residual bilateral weakness      Anemia      PEG (percutaneous endoscopic gastrostomy) status  removed July 2020      Intubation of airway performed without difficulty  Dec 2019  CVA c/b status epilepticus requiring intubation and PEG in 12/2019-      ESRD on dialysis  M/W/F      Seizures  after CVA, last seizure was last week 4/07/22      History of insertion of stent into coronary artery bypass graft  2009 and 2019      S/P CABG x 3  off pump C3L on 8/13/20          Vital Signs Last 24 Hrs  T(C): 36.6 (27 Aug 2022 13:49), Max: 36.9 (27 Aug 2022 05:00)  T(F): 97.8 (27 Aug 2022 13:49), Max: 98.4 (27 Aug 2022 05:00)  HR: 69 (27 Aug 2022 13:49) (69 - 85)  BP: 133/70 (27 Aug 2022 13:49) (133/70 - 181/69)  BP(mean): 104 (26 Aug 2022 16:38) (104 - 104)  RR: 20 (27 Aug 2022 13:49) (16 - 20)  SpO2: 99% (27 Aug 2022 13:49) (97% - 100%)    Parameters below as of 27 Aug 2022 13:49  Patient On (Oxygen Delivery Method): room air        I&O's Summary    26 Aug 2022 07:01  -  27 Aug 2022 07:00  --------------------------------------------------------  IN: 1420 mL / OUT: 3100 mL / NET: -1680 mL                              8.5    7.16  )-----------( 465      ( 27 Aug 2022 06:36 )             27.6     08-27    132<L>  |  93<L>  |  55<H>  ----------------------------<  199<H>  4.6   |  25  |  3.59<H>    Ca    9.0      27 Aug 2022 06:40  Phos  3.6     08-27  Mg     1.9     08-27    TPro  6.7  /  Alb  2.7<L>  /  TBili  0.3  /  DBili  0.2  /  AST  16  /  ALT  19  /  AlkPhos  330<H>  08-27            Review of systems  Gen: No weight changes, fatigue, fevers/chills, weakness  Skin: No rashes  Head/Eyes/Ears/Mouth: No headache; Normal hearing; Normal vision w/o blurriness; No sinus pain/discomfort, sore throat  Respiratory: No dyspnea, cough, wheezing, hemoptysis  CV: No chest pain, PND, orthopnea  GI: Mild abdominal pain at surgical incision site and radiating to Rt flank; denies diarrhea, constipation, nausea, vomiting, melena, hematochezia  : No increased frequency, dysuria, hematuria, nocturia  MSK: No joint pain/swelling; no back pain; no edema  Neuro: No dizziness/lightheadedness, weakness, seizures, numbness, tingling  Heme: No easy bruising or bleeding  Endo: No heat/cold intolerance  Psych: No significant nervousness, anxiety, stress, depression  All other systems were reviewed and are negative, except as noted.      PHYSICAL EXAM:  Constitutional: Well developed / well nourished  Eyes: Anicteric  Respiratory: nonlabored   Cardiovascular: normotensive, regular rate   Gastrointestinal: Soft, non distended, mild tenderness at the incision site and Rt flank, incision well healed   Extremities: SCD's in place and working bilaterally  Neurological: awake, alert, answering questions appropriately   Skin: erythema and localized edema to RLQ and Rt flank with associated warmth.   Musculoskeletal: Moving all extremities  Psychiatric: Responsive

## 2022-08-27 NOTE — PROGRESS NOTE ADULT - ASSESSMENT
66 yr old man with h/o DM, HTN, CAD s/p CABG, Afib on Eliquis, CVA in 2019, Seizure d/o s/p DDRT from DCD donor on 4/21/22.   Complicated post transplant course - initial DGF eventually recovered. Klebsiella UTI/Sepsis rx with antibiotics, status epilepticus, Envarsus discontinued for neurotoxicity/seizures and he was started on belatacept -> complicated by ANA due to rejection Grade 2a treated with pulse steroids,Currently HD dependent.     S/p DDRT from DCD donor on 4/21/22    Poor graft function due to Grade 2a rejection (biopsy 7/29) , c4d negative, No DSA.    S/p pulse solumedrol completed 8/3. Thymo not given due to frailty    Remains dialysis dependent. Oliguric. Transplant US on 8/25 homogenous flow, no hydro, 1.9 x 1.5 cm collection, no significant TRAS.    Last HD was yesterday via temporary HD catheter. Volume status and electrolytes fair. No need for HD today.     Immunosuppression   - MMF on hold due to recurrent infections, Envarsus d/darren for seizures and poor mental status and changed to Belatacept. Currently on sirolimus and prednisone   - Continue sirolimus 6mg daily - level 5.1 today   - Continue prednisone 10mg po daily     Infection prophylaxis - Nystatin, Bactrim, Valcyte     Recurrent ESBL Klebsiella UTI - 7/15 and 7/25. Completed ertapenem on 8/3     Anemia s/p PRBC 1 unit yesterday. Continue epoetin 14,000 units with HD   Hypertension - Acceptable. Continue Amlodipine 10, Hydralazine 75 mg TID and Doxazosin 4.   Diabetes - Controlled, Continue lantus 7 and Admelog pre meal and sliding scale   Seizure d/o - on Briviact and phenytoin. Phenytoin level 3.4 low. Neurology follow up.   {[id number]}

## 2022-08-27 NOTE — PROGRESS NOTE ADULT - NS ATTEND AMEND GEN_ALL_CORE FT
clinically unchanged  induration around incision stable  oliguric on HD  immunosuppression sirolimus and mmf and steroids

## 2022-08-27 NOTE — PROGRESS NOTE ADULT - SUBJECTIVE AND OBJECTIVE BOX
[facility] – [place], [place] [facility]ROLOGY PRACTICE   MD NINA MASON MD KRISTINE SOLTANPOUR, WALDO PALMA    TEL:  OFFICE: [phone]  From 5pm-7am Answering Service [phone]    -- RENAL FOLLOW UP NOTE ---Date of Service 08-27-22 @ 23:50    Patient is a 66y old  Male who presents with a chief complaint of Status Epilepticus (27 Aug 2022 14:47)      Patient seen and examined at bedside. No chest pain/sob    VITALS:  T(F): 98.3 (08-27-22 @ 21:13), Max: 98.4 (08-27-22 @ 05:00)  HR: 79 (08-27-22 @ 21:13)  BP: 165/72 (08-27-22 @ 21:13)  RR: 18 (08-27-22 @ 21:13)  SpO2: 100% (08-27-22 @ 21:13)  Wt(kg): --    08-26 @ 07:01  -  08-27 @ 07:00  --------------------------------------------------------  IN: 1420 mL / OUT: 3100 mL / NET: -1680 mL    08-27 @ 07:01  -  08-27 @ 23:50  --------------------------------------------------------  IN: 720 mL / OUT: 24 mL / NET: 696 mL          PHYSICAL EXAM:  General: NAD  Neck: No JVD  Respiratory: CTAB, no wheezes, rales or rhonchi  Cardiovascular: S1, S2, RRR  Gastrointestinal: BS+, soft, NT/ND  Extremities: No peripheral edema    Hospital Medications:   MEDICATIONS  (STANDING):  amLODIPine   Tablet 10 milliGRAM(s) Oral daily  brivaracetam 50 milliGRAM(s) Oral two times a day  cephalexin 500 milliGRAM(s) Oral every 12 hours  chlorhexidine 2% Cloths 1 Application(s) Topical <User Schedule>  doxazosin 4 milliGRAM(s) Oral <User Schedule>  epoetin cortney-epbx (RETACRIT) Injectable 68686 Unit(s) SubCutaneous <User Schedule>  finasteride 5 milliGRAM(s) Oral daily  heparin   Injectable 5000 Unit(s) SubCutaneous every 12 hours  hydrALAZINE 75 milliGRAM(s) Oral three times a day  insulin glargine Injectable (LANTUS) 7 Unit(s) SubCutaneous at bedtime  insulin lispro (ADMELOG) corrective regimen sliding scale   SubCutaneous three times a day before meals  insulin lispro (ADMELOG) corrective regimen sliding scale   SubCutaneous at bedtime  insulin lispro Injectable (ADMELOG) 9 Unit(s) SubCutaneous three times a day before meals  levothyroxine 50 MICROGram(s) Oral daily  melatonin 6 milliGRAM(s) Oral at bedtime  multivitamin 1 Tablet(s) Oral daily  nystatin    Suspension 477384 Unit(s) Oral four times a day  pantoprazole    Tablet 40 milliGRAM(s) Oral <User Schedule>  phenytoin   Capsule 200 milliGRAM(s) Oral two times a day  polyethylene glycol 3350 17 Gram(s) Oral daily  predniSONE   Tablet 10 milliGRAM(s) Oral daily  senna 2 Tablet(s) Oral at bedtime  sirolimus 6 milliGRAM(s) Oral <User Schedule>  trimethoprim   80 mG/sulfamethoxazole 400 mG 1 Tablet(s) Oral daily  valGANciclovir 450 milliGRAM(s) Oral <User Schedule>      LABS:  08-27    132<L>  |  93<L>  |  55<H>  ----------------------------<  199<H>  4.6   |  25  |  3.59<H>    Ca    9.0      27 Aug 2022 06:40  Phos  3.6     08-27  Mg     1.9     08-27    TPro  6.7  /  Alb  2.7<L>  /  TBili  0.3  /  DBili  0.2  /  AST  16  /  ALT  19  /  AlkPhos  330<H>  08-27    Creatinine Trend: 3.59 <--, 4.83 <--, 3.63 <--, 4.84 <--, 3.88 <--, 5.79 <--, 4.65 <--    Albumin, Serum: 2.7 g/dL (08-27 @ 06:40)  Phosphorus Level, Serum: 3.6 mg/dL (08-27 @ 06:40)                              8.5    7.16  )-----------( 465      ( 27 Aug 2022 06:36 )             27.6     Urine Studies:  Urinalysis - [08-16-22 @ 18:49]      Color Yellow / Appearance Slightly Turbid / SG 1.018 / pH 8.5      Gluc 200 mg/dL / Ketone Negative  / Bili Negative / Urobili Negative       Blood Large / Protein >600 / Leuk Est Moderate / Nitrite Positive      RBC 13 / WBC 40 / Hyaline 7 / Gran  / Sq Epi  / Non Sq Epi 4 / Bacteria Moderate      Iron 17, TIBC 171, %sat 10      [05-02-22 @ 13:03]  Ferritin 6336      [07-27-22 @ 13:00]  PTH -- (Ca 9.2)      [02-05-22 @ 10:13]   294  HbA1c 6.0      [02-21-20 @ 08:53]  TSH 4.69      [07-06-22 @ 18:36]  Lipid: chol --, , HDL --, LDL --      [07-27-22 @ 13:00]        RADIOLOGY & ADDITIONAL STUDIES:

## 2022-08-28 NOTE — PROGRESS NOTE ADULT - ASSESSMENT
66M with h/o DM type II, HTN, CAD s/p CABG in 2020, Afib on Eliquis, CVA in 2019 due to Afib, Seizure d/o (last episode was on 4/8/22), h/o GI bleed in 2/2022 EGD with duodenal ulcer and ESRD on HD s/p R DDRT from DCD donor on 4/21/22 complicated by DGF requiring HD until 4/29/22 who presented with status epilepticus in setting of UTI/antibiotics and sub-therapeutic valproic acid level. Transferred to SICU on 7/25 with signs of sepsis, now with refractory seizures. Last reported seizure on 8/16. Mental status continuing to improve.     Seizure Disorder:  - Neuro/Epilepsy Teams following. Avoid Fycompa 2/2 agitation in past  - 1 min seizure with clonic movements noted on EEG 8/16.   - Continue additional anti-seizure medication Briviact 50mg BID with additional 50mg post each HD session  - No further seizures noted.  EEG 8/16-8/18  - prior MRI head 6/27 with less concerns for PRES, likely ischemic changes  - o/n CTH 8/3 with no acute findings  - will follow up with neuro regarding seizure medications recs      R DCD DDRT 4/21/22 c/b 2A ACR  - 2A ACR: s/p pulse steroids. Continue Prednisone 10mg QD  - HD/UF as per nephrology for fluid overload/anuria, HD on 8/26 w/ 1 pRBC during HD  - 8/14 surveillance BCx negative  - Repeated US from 8/25 did not show any renal artery stenosis, but increased resistive index, consistent with parynchyma disease was noted  - S/P removal of ureteral stent on 7/12  - 8/8 Punch biopsy wound Culture: ngtd. Negative for PTLD  - DVT ppx with SQH  - fungitel negative  - LE studies WNL    RLQ Cellulitis:  - Deferring right psoas muscle/fascia biopsy as wound is not worsening  - Continue Keflex    Immunosuppression:   - Belatacept: 6/23-8/1 discontinued  - Sirolimus 6 mg QD   - Pred 10mg QD  - PPX:  Nystatin, bactrim, valcyte (MWF)    DM  - BG improved on Lantus 7U. Continue pre-meal 9U and ISS.   - Fingerstick ac and qhs  - Endo following    AFib/R IJ DVT   - Eliquis with ~40% less efficacy while on fosphenytoin.    - Lovenox held for ANA and transaminitis   - rate controlled  -  BID; will continue to discuss need for full A/c    HTN:  - Amlodipine/Doxazosin/Hydralazline  - off Coreg due to bradycardia    BPH:  - Continue Doxazosin/Proscar

## 2022-08-28 NOTE — PROGRESS NOTE ADULT - SUBJECTIVE AND OBJECTIVE BOX
[facility]   MD NINA MASON MD KRISTINE SOLTANPOUR, DO ANGELA WONG, PA    TEL:  OFFICE: [phone]  From 5pm-7am Answering Service [phone]    -- RENAL FOLLOW UP NOTE ---Date of Service 08-28-22 @ 18:07    Patient is a 66y old  Male who presents with a chief complaint of Status Epilepticus (28 Aug 2022 09:05)      Patient seen and examined at bedside. Resting. Dr. Castelan his son and wife at bedside.     VITALS:  T(F): 98.2 (08-28-22 @ 17:00), Max: 98.3 (08-27-22 @ 21:13)  HR: 82 (08-28-22 @ 17:00)  BP: 173/66 (08-28-22 @ 17:00)  RR: 18 (08-28-22 @ 17:00)  SpO2: 94% (08-28-22 @ 17:00)  Wt(kg): --    08-27 @ 07:01  -  08-28 @ 07:00  --------------------------------------------------------  IN: 720 mL / OUT: 24 mL / NET: 696 mL    08-28 @ 07:01  -  08-28 @ 18:07  --------------------------------------------------------  IN: 480 mL / OUT: 0 mL / NET: 480 mL          PHYSICAL EXAM:  General: NAD  Neck: No JVD  Respiratory: CTAB, no wheezes, rales or rhonchi  Cardiovascular: S1, S2, RRR  Gastrointestinal: BS+, soft, NT/ND  Extremities: No peripheral edema    Hospital Medications:   MEDICATIONS  (STANDING):  amLODIPine   Tablet 10 milliGRAM(s) Oral daily  brivaracetam 50 milliGRAM(s) Oral two times a day  cephalexin 500 milliGRAM(s) Oral every 12 hours  chlorhexidine 2% Cloths 1 Application(s) Topical <User Schedule>  doxazosin 4 milliGRAM(s) Oral <User Schedule>  epoetin cortney-epbx (RETACRIT) Injectable 99282 Unit(s) SubCutaneous <User Schedule>  finasteride 5 milliGRAM(s) Oral daily  heparin   Injectable 5000 Unit(s) SubCutaneous every 12 hours  hydrALAZINE 75 milliGRAM(s) Oral three times a day  insulin glargine Injectable (LANTUS) 7 Unit(s) SubCutaneous at bedtime  insulin lispro (ADMELOG) corrective regimen sliding scale   SubCutaneous three times a day before meals  insulin lispro (ADMELOG) corrective regimen sliding scale   SubCutaneous at bedtime  insulin lispro Injectable (ADMELOG) 9 Unit(s) SubCutaneous three times a day before meals  levothyroxine 50 MICROGram(s) Oral daily  melatonin 6 milliGRAM(s) Oral at bedtime  multivitamin 1 Tablet(s) Oral daily  nystatin    Suspension 940863 Unit(s) Oral four times a day  pantoprazole    Tablet 40 milliGRAM(s) Oral <User Schedule>  phenytoin   Capsule 200 milliGRAM(s) Oral two times a day  polyethylene glycol 3350 17 Gram(s) Oral daily  predniSONE   Tablet 10 milliGRAM(s) Oral daily  senna 2 Tablet(s) Oral at bedtime  sirolimus 6 milliGRAM(s) Oral <User Schedule>  trimethoprim   80 mG/sulfamethoxazole 400 mG 1 Tablet(s) Oral daily  valGANciclovir 450 milliGRAM(s) Oral <User Schedule>      LABS:  08-28    133<L>  |  92<L>  |  84<H>  ----------------------------<  134<H>  4.8   |  23  |  4.65<H>    Ca    9.3      28 Aug 2022 06:27  Phos  5.2     08-28  Mg     2.1     08-28    TPro  7.1  /  Alb  2.9<L>  /  TBili  0.3  /  DBili  0.2  /  AST  22  /  ALT  17  /  AlkPhos  379<H>  08-28    Creatinine Trend: 4.65 <--, 3.59 <--, 4.83 <--, 3.63 <--, 4.84 <--, 3.88 <--, 5.79 <--    Albumin, Serum: 2.9 g/dL (08-28 @ 06:27)  Phosphorus Level, Serum: 5.2 mg/dL (08-28 @ 06:27)                              9.1    7.36  )-----------( 470      ( 28 Aug 2022 06:30 )             29.3     Urine Studies:  Urinalysis - [08-16-22 @ 18:49]      Color Yellow / Appearance Slightly Turbid / SG 1.018 / pH 8.5      Gluc 200 mg/dL / Ketone Negative  / Bili Negative / Urobili Negative       Blood Large / Protein >600 / Leuk Est Moderate / Nitrite Positive      RBC 13 / WBC 40 / Hyaline 7 / Gran  / Sq Epi  / Non Sq Epi 4 / Bacteria Moderate      Iron 17, TIBC 171, %sat 10      [05-02-22 @ 13:03]  Ferritin 6336      [07-27-22 @ 13:00]  PTH -- (Ca 9.2)      [02-05-22 @ 10:13]   294  HbA1c 6.0      [02-21-20 @ 08:53]  TSH 4.69      [07-06-22 @ 18:36]  Lipid: chol --, , HDL --, LDL --      [07-27-22 @ 13:00]        RADIOLOGY & ADDITIONAL STUDIES:

## 2022-08-28 NOTE — PROGRESS NOTE ADULT - NS ATTEND AMEND GEN_ALL_CORE FT
agree with above  clinincally unchanged  eating and ambulating with help  oliguric on HD  immunosuppression sirolimus , mmf and steroids

## 2022-08-28 NOTE — PROGRESS NOTE ADULT - NUTRITIONAL ASSESSMENT
This patient has been assessed with a concern for Malnutrition and has been determined to have a diagnosis/diagnoses of Severe protein-calorie malnutrition.    This patient is being managed with:   Diet Consistent Carbohydrate w/Evening Snack-  1000mL Fluid Restriction ([id number])  Supplement Feeding Modality:  Oral  Nepro Cans or Servings Per Day:  3       Frequency:  Daily  Entered: Aug 19 2022  5:52PM

## 2022-08-28 NOTE — PROGRESS NOTE ADULT - ASSESSMENT
66 yr old man with h/o DM, HTN, CAD s/p CABG, Afib on Eliquis, CVA in 2019, Seizure d/o s/p DDRT from DCD donor on 4/21/22.   Complicated post transplant course - initial DGF eventually recovered. Klebsiella UTI/Sepsis rx with antibiotics, status epilepticus, Envarsus discontinued for neurotoxicity/seizures and he was started on belatacept -> complicated by ANA due to rejection Grade 2a treated with pulse steroids,Currently HD dependent.     S/p DDRT from DCD donor on 4/21/22    Poor graft function due to Grade 2a rejection (biopsy 7/29) , c4d negative, No DSA.    S/p pulse solumedrol completed 8/3. Thymo not given due to frailty    Remains dialysis dependent. Oliguric. Transplant US on 8/25 homogenous flow, no hydro, 1.9 x 1.5 cm collection, no significant TRAS.    Last HD was 8/26 via temporary HD catheter. Volume status and electrolytes fair. No need for HD today. Will plan for HD tomorrow 8/29     Immunosuppression   - MMF on hold due to recurrent infections, Envarsus d/darren for seizures and poor mental status and changed to Belatacept. Currently on sirolimus and prednisone   - Continue sirolimus 6mg daily - level 5.1 today   - Continue prednisone 10mg po daily     Infection prophylaxis - Nystatin, Bactrim, Valcyte     Recurrent ESBL Klebsiella UTI - 7/15 and 7/25. Completed ertapenem on 8/3     Anemia s/p PRBC 1 unit 8/26. Continue epoetin 14,000 units with HD   Hypertension - Acceptable. Continue Amlodipine 10, Hydralazine 75 mg TID and Doxazosin 4.   Diabetes - Controlled, Continue lantus 7 and Admelog pre meal and sliding scale   Seizure d/o - on Briviact and phenytoin.. Neurology following.   {[id number]}

## 2022-08-28 NOTE — PROGRESS NOTE ADULT - SUBJECTIVE AND OBJECTIVE BOX
Transplant Surgery - Multidisciplinary Rounds  --------------------------------------------------------------  DDRT  Date: 4/21/2022    HPI: 66M with PMH: DM type II, HTN, CAD s/p CABG in 2020, AFib on Eliquis, CVA in 2019 due to Afib, Seizure d/o following CVA, last episode was on 4/8/22, h/o GIB in 2/2022 EGD with duodenal ulcer.  ESRD due to DM was on HD since 2019.     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  Donor was 58 , KDPI 82%, DCD, single vessels and ureter, HLA mismatch 1, 2, 2. No DSA, cPRA 0%. 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 Re-admitted in May for anemia in the setting of large perinephric hematoma s/p evacuation and repair of arterial anastomosis on 5/2/22. Intra operative biopsy showed no rejection, Creatinine ranging ~2mg/dL.    In Rehab:  He was recently dx with klebsiella UTI with Ertapenem - then switched to Levaquin    He also had parainfluenza pneumonia. Was at rehab progressing well.      Hospital course:  - 6/10: transferred from rehab facility for seizure status epilepticus. Intubated for respiratory protection and transferred to MICU.  EEG showed no seizure activity. Neuro consulted.   - 6/11: Extubated. Found to have R pleural effusion. s/p Thoracentesis 1.3L. Eliquis changed to heparin drip.  Post procedure drop in H&H. 5u PRBC, 2 u FFP.   - 6/11 evening: Transferred to SICU from MICU for further care in setting of hypoxia, significant R pleural effusion, anemia and hemodynamic instability  - 6/11 Intubated at 9pm and sedated on Precedex.  CT placed, 600ml blood drained.  1L total overnight, started pressors  - 6/11 Received Protamine for PTT >100 (was on Heparin drip started for AF), 2 u FFP and 5u PRBC total  - 6/13 s/p VATS 2.2L old blood removed; second chest tube placed  - 6/18-19: chest tubes removed  - 6/21: ?PRES vs. chronic ischemic changes on MRI, off Envarsus, switched to Belatacept 6/23 with good graft function  - 6/25: Tx to SICU for refractory seizures, improved with IV antiepileptic regimen  - 7/10: Downgraded from SICU to floor.   - 7/11: OR-->URETERAL STENT REMOVED  - 7/15: ESBL Kleb UTI (50-90K), completed Ertapenem x 3 Days  - 7/25: Tx to SICU for Sepsis/ elevated LFTS  - 7/27: Shiley placed for HD  - 7/28: ongoing fevers -> started Ertapenem/Fluc  - 7/29: HD restarted + 1U PRBC.  IR bx with 2A rejection, started pulse steroids. LFTs have improved  - 8/4: refractory seizures  - 8/8: s/p derm bx with no acute finding  - 8/8: s/p permacath exchange  - 8/16: One seizure on EEG medications changed added brivaracetam  - 8/22: HD, followed by U/S of Transplant kidney    Present:   Patient seen and examined with multidisciplinary team including Transplant Surgeon: Dr. Davis, Transplant Nephrologist: Dr. Nova, and unit RN during am rounds.  Disciplines not in attendance will be notified of the plan.     Interval Events: No acute events overnight. Remains anuric.     Immunosupression:   Induction: Simulect                                               Maintenance immunosupression: Sirolimus 6 mg QD, prednisone 10   Ongoing monitoring for signs of rejection.    Potential Discharge date:    Education:  Medications    Plan of care:  See Below    MEDICATIONS  (STANDING):  amLODIPine   Tablet 10 milliGRAM(s) Oral daily  brivaracetam 50 milliGRAM(s) Oral two times a day  cephalexin 500 milliGRAM(s) Oral every 12 hours  chlorhexidine 2% Cloths 1 Application(s) Topical <User Schedule>  doxazosin 4 milliGRAM(s) Oral <User Schedule>  epoetin cortney-epbx (RETACRIT) Injectable 15012 Unit(s) SubCutaneous <User Schedule>  finasteride 5 milliGRAM(s) Oral daily  heparin   Injectable 5000 Unit(s) SubCutaneous every 12 hours  hydrALAZINE 75 milliGRAM(s) Oral three times a day  insulin glargine Injectable (LANTUS) 7 Unit(s) SubCutaneous at bedtime  insulin lispro (ADMELOG) corrective regimen sliding scale   SubCutaneous three times a day before meals  insulin lispro (ADMELOG) corrective regimen sliding scale   SubCutaneous at bedtime  insulin lispro Injectable (ADMELOG) 9 Unit(s) SubCutaneous three times a day before meals  levothyroxine 50 MICROGram(s) Oral daily  melatonin 6 milliGRAM(s) Oral at bedtime  multivitamin 1 Tablet(s) Oral daily  nystatin    Suspension 828789 Unit(s) Oral four times a day  pantoprazole    Tablet 40 milliGRAM(s) Oral <User Schedule>  phenytoin   Capsule 200 milliGRAM(s) Oral two times a day  polyethylene glycol 3350 17 Gram(s) Oral daily  predniSONE   Tablet 10 milliGRAM(s) Oral daily  senna 2 Tablet(s) Oral at bedtime  sirolimus 6 milliGRAM(s) Oral <User Schedule>  trimethoprim   80 mG/sulfamethoxazole 400 mG 1 Tablet(s) Oral daily  valGANciclovir 450 milliGRAM(s) Oral <User Schedule>    MEDICATIONS  (PRN):  acetaminophen     Tablet .. 650 milliGRAM(s) Oral every 6 hours PRN Mild Pain (1 - 3)  diphenhydrAMINE 25 milliGRAM(s) Oral at bedtime PRN Insomnia      PAST MEDICAL & SURGICAL HISTORY:  Hypertension      Diabetes      Dyslipidemia      CAD (Coronary Artery Disease)  with Stents in 06/2009 and 6/2019, s/p off-pump C3L on 8/13/20      Hypothyroidism      CVA (cerebral vascular accident)  12/13/19 with residual bilateral weakness      Anemia      PEG (percutaneous endoscopic gastrostomy) status  removed July 2020      Intubation of airway performed without difficulty  Dec 2019  CVA c/b status epilepticus requiring intubation and PEG in 12/2019-      ESRD on dialysis  M/W/F      Seizures  after CVA, last seizure was last week 4/07/22      History of insertion of stent into coronary artery bypass graft  2009 and 2019      S/P CABG x 3  off pump C3L on 8/13/20          Vital Signs Last 24 Hrs  T(C): 36.4 (28 Aug 2022 04:48), Max: 36.8 (27 Aug 2022 21:13)  T(F): 97.5 (28 Aug 2022 04:48), Max: 98.3 (27 Aug 2022 21:13)  HR: 75 (28 Aug 2022 04:48) (69 - 79)  BP: 164/67 (28 Aug 2022 04:48) (133/70 - 169/74)  BP(mean): 103 (27 Aug 2022 21:13) (103 - 106)  RR: 18 (28 Aug 2022 04:48) (18 - 20)  SpO2: 97% (28 Aug 2022 04:48) (93% - 100%)    Parameters below as of 28 Aug 2022 04:48  Patient On (Oxygen Delivery Method): room air        I&O's Summary    27 Aug 2022 07:01  -  28 Aug 2022 07:00  --------------------------------------------------------  IN: 720 mL / OUT: 24 mL / NET: 696 mL                              9.1    7.36  )-----------( 470      ( 28 Aug 2022 06:30 )             29.3     08-28    133<L>  |  92<L>  |  84<H>  ----------------------------<  134<H>  4.8   |  23  |  4.65<H>    Ca    9.3      28 Aug 2022 06:27  Phos  5.2     08-28  Mg     2.1     08-28    TPro  7.1  /  Alb  2.9<L>  /  TBili  0.3  /  DBili  0.2  /  AST  22  /  ALT  17  /  AlkPhos  379<H>  08-28      Review of systems  Gen: No weight changes, fatigue, fevers/chills, weakness  Skin: No rashes  Head/Eyes/Ears/Mouth: No headache; Normal hearing; Normal vision w/o blurriness; No sinus pain/discomfort, sore throat  Respiratory: No dyspnea, cough, wheezing, hemoptysis  CV: No chest pain, PND, orthopnea  GI: Mild abdominal pain at surgical incision site and radiating to Rt flank; denies diarrhea, constipation, nausea, vomiting, melena, hematochezia  : No increased frequency, dysuria, hematuria, nocturia  MSK: No joint pain/swelling; no back pain; no edema  Neuro: No dizziness/lightheadedness, weakness, seizures, numbness, tingling  Heme: No easy bruising or bleeding  Endo: No heat/cold intolerance  Psych: No significant nervousness, anxiety, stress, depression  All other systems were reviewed and are negative, except as noted.      PHYSICAL EXAM:  Constitutional: Well developed / well nourished  Eyes: Anicteric  Respiratory: nonlabored   Cardiovascular: normotensive, regular rate   Gastrointestinal: Soft, non distended, mild tenderness at the incision site and Rt flank, incision well healed   Extremities: SCD's in place and working bilaterally  Neurological: awake, alert, answering questions appropriately   Skin: erythema and localized edema to RLQ and Rt flank with associated warmth.   Musculoskeletal: Moving all extremities  Psychiatric: Responsive

## 2022-08-28 NOTE — PROGRESS NOTE ADULT - SUBJECTIVE AND OBJECTIVE BOX
[facility] DIVISION OF KIDNEY DISEASES AND HYPERTENSION -- FOLLOW UP NOTE  --------------------------------------------------------------------------------  Authored by: Ozzie Nova   Cell # [phone]     CHAD DUNBAR was seen and examined at bedside.   Patient is a 66y old  Male who presents with a chief complaint of Status Epilepticus (08-28-22)      24 hour events/subjective:      Standing Inpatient Medications  amLODIPine   Tablet 10 milliGRAM(s) Oral daily  brivaracetam 50 milliGRAM(s) Oral two times a day  cephalexin 500 milliGRAM(s) Oral every 12 hours  doxazosin 4 milliGRAM(s) Oral <User Schedule>  epoetin cortney-epbx (RETACRIT) Injectable 02115 Unit(s) SubCutaneous <User Schedule>  finasteride 5 milliGRAM(s) Oral daily  heparin   Injectable 5000 Unit(s) SubCutaneous every 12 hours  hydrALAZINE 75 milliGRAM(s) Oral three times a day  insulin glargine Injectable (LANTUS) 7 Unit(s) SubCutaneous at bedtime  insulin lispro (ADMELOG) corrective regimen sliding scale   SubCutaneous three times a day before meals  insulin lispro (ADMELOG) corrective regimen sliding scale   SubCutaneous at bedtime  insulin lispro Injectable (ADMELOG) 9 Unit(s) SubCutaneous three times a day before meals  levothyroxine 50 MICROGram(s) Oral daily  melatonin 6 milliGRAM(s) Oral at bedtime  multivitamin 1 Tablet(s) Oral daily  nystatin    Suspension 635509 Unit(s) Oral four times a day  pantoprazole    Tablet 40 milliGRAM(s) Oral <User Schedule>  phenytoin   Capsule 200 milliGRAM(s) Oral two times a day  polyethylene glycol 3350 17 Gram(s) Oral daily  predniSONE   Tablet 10 milliGRAM(s) Oral daily  senna 2 Tablet(s) Oral at bedtime  sirolimus 6 milliGRAM(s) Oral <User Schedule>  trimethoprim   80 mG/sulfamethoxazole 400 mG 1 Tablet(s) Oral daily  valGANciclovir 450 milliGRAM(s) Oral <User Schedule>    PRN Inpatient Medications  acetaminophen     Tablet .. 650 milliGRAM(s) Oral every 6 hours PRN  diphenhydrAMINE 25 milliGRAM(s) Oral at bedtime PRN        VITALS/PHYSICAL EXAM  --------------------------------------------------------------------------------  T(C): 36.4 (08-28-22 @ 04:48), Max: 36.8 (08-27-22 @ 21:13)  HR: 75 (08-28-22 @ 04:48) (69 - 79)  BP: 164/67 (08-28-22 @ 04:48) (133/70 - 169/74)  RR: 18 (08-28-22 @ 04:48) (18 - 20)  SpO2: 97% (08-28-22 @ 04:48) (93% - 100%)      08-27-22 @ 07:01  -  08-28-22 @ 07:00  --------------------------------------------------------  IN: 720 mL / OUT: 24 mL / NET: 696 mL      Physical Exam:      LABS/STUDIES  --------------------------------------------------------------------------------              9.1    7.36  >-----------<  470      [08-28-22 @ 06:30]              29.3     133  |  92  |  84  ----------------------------<  134      [08-28-22 @ 06:27]  4.8   |  23  |  4.65        Ca     9.3     [08-28-22 @ 06:27]      Mg     2.1     [08-28-22 @ 06:27]      Phos  5.2     [08-28-22 @ 06:27]    TPro  7.1  /  Alb  2.9  /  TBili  0.3  /  DBili  0.2  /  AST  22  /  ALT  17  /  AlkPhos  379  [08-28-22 @ 06:27]      PTT: 32.8       [08-28-22 @ 06:33]      Creatinine Trend:  SCr 4.65 [08-28 @ 06:27]  SCr 3.59 [08-27 @ 06:40]  SCr 4.83 [08-26 @ 06:16]  SCr 3.63 [08-25 @ 06:55]  SCr 4.84 [08-24 @ 06:46]      Sirolimus (Rapamycin) Level, Serum: 5.1 ng/mL (08-27 @ 06:36)  Sirolimus (Rapamycin) Level, Serum: 4.6 ng/mL (08-26 @ 06:16)  Sirolimus (Rapamycin) Level, Serum: 4.2 ng/mL (08-25 @ 06:51)  Sirolimus (Rapamycin) Level, Serum: 5.4 ng/mL (08-24 @ 06:46)        CAPILLARY BLOOD GLUCOSE  POCT Blood Glucose.: 134 mg/dL (28 Aug 2022 08:45)  POCT Blood Glucose.: 122 mg/dL (27 Aug 2022 21:15)  POCT Blood Glucose.: 228 mg/dL (27 Aug 2022 17:39)  POCT Blood Glucose.: 140 mg/dL (27 Aug 2022 12:50)      Urinalysis - [08-16-22 @ 18:49]      Color Yellow / Appearance Slightly Turbid / SG 1.018 / pH 8.5      Gluc 200 mg/dL / Ketone Negative  / Bili Negative / Urobili Negative       Blood Large / Protein >600 / Leuk Est Moderate / Nitrite Positive      RBC 13 / WBC 40 / Hyaline 7 / Gran  / Sq Epi  / Non Sq Epi 4 / Bacteria Moderate           [facility] DIVISION OF KIDNEY DISEASES AND HYPERTENSION -- FOLLOW UP NOTE  --------------------------------------------------------------------------------  Authored by: Ozzie Nova   Cell # [phone]     CHAD DUNBAR was seen and examined at bedside.   Patient is a 66y old  Male who presents with a chief complaint of Status Epilepticus (08-28-22)    He has h/o DM type II, HTN, CAD s/p CABG in 2020, Afib on Eliquis, CVA in 2019 due to Afib, Hypothyroidism, Seizure d/o, h/o GI bleed in 2/2022 EGD with duodenal ulcer and ESRD on HD s/p R DDRT from DCD donor on 4/21/22 complicated by DGF requiring HD until 4/29/22, later had good graft function who was readmitted with status epilepticus in setting of UTI/antibiotics and sub-therapeutic valproic acid level.  Envarsus was discontinued for seizures and poor mental status and he was started on belatacept. Subsequent ANA due to rejection. 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 Received pulse steroids (Solumedrol 500 on 7/30 -> 250 on 7/31). Did not receive thymo due to frailty.  Currently HD dependent    24 hour events/subjective: No major events. Slept better. Voiding minimal urine, incontinent. Appetite fair. Not ambulating much.   No NVD. No pain. No fever.     Standing Inpatient Medications  amLODIPine   Tablet 10 milliGRAM(s) Oral daily  brivaracetam 50 milliGRAM(s) Oral two times a day  cephalexin 500 milliGRAM(s) Oral every 12 hours  doxazosin 4 milliGRAM(s) Oral <User Schedule>  epoetin cortney-epbx (RETACRIT) Injectable 44151 Unit(s) SubCutaneous <User Schedule>  finasteride 5 milliGRAM(s) Oral daily  heparin   Injectable 5000 Unit(s) SubCutaneous every 12 hours  hydrALAZINE 75 milliGRAM(s) Oral three times a day  insulin glargine Injectable (LANTUS) 7 Unit(s) SubCutaneous at bedtime  insulin lispro (ADMELOG) corrective regimen sliding scale   SubCutaneous three times a day before meals  insulin lispro (ADMELOG) corrective regimen sliding scale   SubCutaneous at bedtime  insulin lispro Injectable (ADMELOG) 9 Unit(s) SubCutaneous three times a day before meals  levothyroxine 50 MICROGram(s) Oral daily  melatonin 6 milliGRAM(s) Oral at bedtime  multivitamin 1 Tablet(s) Oral daily  nystatin    Suspension 427635 Unit(s) Oral four times a day  pantoprazole    Tablet 40 milliGRAM(s) Oral <User Schedule>  phenytoin   Capsule 200 milliGRAM(s) Oral two times a day  polyethylene glycol 3350 17 Gram(s) Oral daily  predniSONE   Tablet 10 milliGRAM(s) Oral daily  senna 2 Tablet(s) Oral at bedtime  sirolimus 6 milliGRAM(s) Oral <User Schedule>  trimethoprim   80 mG/sulfamethoxazole 400 mG 1 Tablet(s) Oral daily  valGANciclovir 450 milliGRAM(s) Oral <User Schedule>    PRN Inpatient Medications  acetaminophen     Tablet .. 650 milliGRAM(s) Oral every 6 hours PRN  diphenhydrAMINE 25 milliGRAM(s) Oral at bedtime PRN        VITALS/PHYSICAL EXAM  --------------------------------------------------------------------------------  T(C): 36.4 (08-28-22 @ 04:48), Max: 36.8 (08-27-22 @ 21:13)  HR: 75 (08-28-22 @ 04:48) (69 - 79)  BP: 164/67 (08-28-22 @ 04:48) (133/70 - 169/74)  RR: 18 (08-28-22 @ 04:48) (18 - 20)  SpO2: 97% (08-28-22 @ 04:48) (93% - 100%)      08-27-22 @ 07:01  -  08-28-22 @ 07:00  --------------------------------------------------------  IN: 720 mL / OUT: 24 mL / NET: 696 mL      Physical Exam:  Awake and alert  Sitting comfortably in chair   No signs of distress  Thin and frail  Abdomen soft and non tender  Abdominal wall induration present, non tender   No LE edema  Left IJ temporary dialysis cathter    LABS/STUDIES  --------------------------------------------------------------------------------              9.1    7.36  >-----------<  470      [08-28-22 @ 06:30]              29.3     133  |  92  |  84  ----------------------------<  134      [08-28-22 @ 06:27]  4.8   |  23  |  4.65        Ca     9.3     [08-28-22 @ 06:27]      Mg     2.1     [08-28-22 @ 06:27]      Phos  5.2     [08-28-22 @ 06:27]    TPro  7.1  /  Alb  2.9  /  TBili  0.3  /  DBili  0.2  /  AST  22  /  ALT  17  /  AlkPhos  379  [08-28-22 @ 06:27]      PTT: 32.8       [08-28-22 @ 06:33]      Creatinine Trend:  SCr 4.65 [08-28 @ 06:27]  SCr 3.59 [08-27 @ 06:40]  SCr 4.83 [08-26 @ 06:16]  SCr 3.63 [08-25 @ 06:55]  SCr 4.84 [08-24 @ 06:46]      Sirolimus (Rapamycin) Level, Serum: 5.1 ng/mL (08-27 @ 06:36)  Sirolimus (Rapamycin) Level, Serum: 4.6 ng/mL (08-26 @ 06:16)  Sirolimus (Rapamycin) Level, Serum: 4.2 ng/mL (08-25 @ 06:51)  Sirolimus (Rapamycin) Level, Serum: 5.4 ng/mL (08-24 @ 06:46)      CAPILLARY BLOOD GLUCOSE  POCT Blood Glucose.: 134 mg/dL (28 Aug 2022 08:45)  POCT Blood Glucose.: 122 mg/dL (27 Aug 2022 21:15)  POCT Blood Glucose.: 228 mg/dL (27 Aug 2022 17:39)  POCT Blood Glucose.: 140 mg/dL (27 Aug 2022 12:50)      Urinalysis - [08-16-22 @ 18:49]      Color Yellow / Appearance Slightly Turbid / SG 1.018 / pH 8.5      Gluc 200 mg/dL / Ketone Negative  / Bili Negative / Urobili Negative       Blood Large / Protein >600 / Leuk Est Moderate / Nitrite Positive      RBC 13 / WBC 40 / Hyaline 7 / Gran  / Sq Epi  / Non Sq Epi 4 / Bacteria Moderate

## 2022-08-29 NOTE — PROGRESS NOTE ADULT - ATTENDING COMMENTS
Pt stable.  Has ANA - dialysis today and change to permacath.  Plan for d/c to rehab  Cont current immunosuppression - sirolimus/ prednisone.  Was on belatacept  cellulitis improved.

## 2022-08-29 NOTE — PROGRESS NOTE ADULT - SUBJECTIVE AND OBJECTIVE BOX
Transplant Surgery - Multidisciplinary Rounds  --------------------------------------------------------------  DDRT  Date: 4/21/2022    HPI: 66M with PMH: DM type II, HTN, CAD s/p CABG in 2020, AFib on Eliquis, CVA in 2019 due to Afib, Seizure d/o following CVA, last episode was on 4/8/22, h/o GIB in 2/2022 EGD with duodenal ulcer.  ESRD due to DM was on HD since 2019.     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  Donor was 58 , KDPI 82%, DCD, single vessels and ureter, HLA mismatch 1, 2, 2. No DSA, cPRA 0%. 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 Re-admitted in May for anemia in the setting of large perinephric hematoma s/p evacuation and repair of arterial anastomosis on 5/2/22. Intra operative biopsy showed no rejection, Creatinine ranging ~2mg/dL.    In Rehab:  He was recently dx with klebsiella UTI with Ertapenem - then switched to Levaquin    He also had parainfluenza pneumonia. Was at rehab progressing well.      Hospital course:  - 6/10: transferred from rehab facility for seizure status epilepticus. Intubated for respiratory protection and transferred to MICU.  EEG showed no seizure activity. Neuro consulted.   - 6/11: Extubated. Found to have R pleural effusion. s/p Thoracentesis 1.3L. Eliquis changed to heparin drip.  Post procedure drop in H&H. 5u PRBC, 2 u FFP.   - 6/11 evening: Transferred to SICU from MICU for further care in setting of hypoxia, significant R pleural effusion, anemia and hemodynamic instability  - 6/11 Intubated at 9pm and sedated on Precedex.  CT placed, 600ml blood drained.  1L total overnight, started pressors  - 6/11 Received Protamine for PTT >100 (was on Heparin drip started for AF), 2 u FFP and 5u PRBC total  - 6/13 s/p VATS 2.2L old blood removed; second chest tube placed  - 6/18-19: chest tubes removed  - 6/21: ?PRES vs. chronic ischemic changes on MRI, off Envarsus, switched to Belatacept 6/23 with good graft function  - 6/25: Tx to SICU for refractory seizures, improved with IV antiepileptic regimen  - 7/10: Downgraded from SICU to floor.   - 7/11: OR-->URETERAL STENT REMOVED  - 7/15: ESBL Kleb UTI (50-90K), completed Ertapenem x 3 Days  - 7/25: Tx to SICU for Sepsis/ elevated LFTS  - 7/27: Shiley placed for HD  - 7/28: ongoing fevers -> started Ertapenem/Fluc  - 7/29: HD restarted + 1U PRBC.  IR bx with 2A rejection, started pulse steroids. LFTs have improved  - 8/4: refractory seizures  - 8/8: s/p derm bx with no acute finding  - 8/8: s/p permacath exchange  - 8/16: One seizure on EEG medications changed added brivaracetam  - 8/22: HD, followed by U/S of Transplant kidney    Present:   Patient seen and examined with multidisciplinary team including Transplant Surgeon: Dr. Davis, Transplant Nephrologist: Dr. Nova, and unit RN during am rounds.  Disciplines not in attendance will be notified of the plan.     Interval Events:   - No acute events overnight.   - Remains anuric on HD      Immunosuppression:   Induction: Simulect                                               Maintenance immunosuppression: Sirolimus 6 mg QD, prednisone 10 , MMF held  Ongoing monitoring for signs of rejection.    Potential Discharge date: pending clinical improvement    Education:  Medications    Plan of care:  See Below      MEDICATIONS  (STANDING):  amLODIPine   Tablet 10 milliGRAM(s) Oral daily  brivaracetam 50 milliGRAM(s) Oral two times a day  brivaracetam 50 milliGRAM(s) Oral once  cephalexin 500 milliGRAM(s) Oral every 12 hours  chlorhexidine 2% Cloths 1 Application(s) Topical <User Schedule>  doxazosin 4 milliGRAM(s) Oral <User Schedule>  epoetin cortney-epbx (RETACRIT) Injectable 98887 Unit(s) SubCutaneous <User Schedule>  finasteride 5 milliGRAM(s) Oral daily  heparin   Injectable 5000 Unit(s) SubCutaneous every 12 hours  hydrALAZINE 75 milliGRAM(s) Oral three times a day  insulin glargine Injectable (LANTUS) 7 Unit(s) SubCutaneous at bedtime  insulin lispro (ADMELOG) corrective regimen sliding scale   SubCutaneous three times a day before meals  insulin lispro (ADMELOG) corrective regimen sliding scale   SubCutaneous at bedtime  insulin lispro Injectable (ADMELOG) 9 Unit(s) SubCutaneous three times a day before meals  levothyroxine 50 MICROGram(s) Oral daily  melatonin 6 milliGRAM(s) Oral at bedtime  multivitamin 1 Tablet(s) Oral daily  nystatin    Suspension 712390 Unit(s) Oral four times a day  pantoprazole    Tablet 40 milliGRAM(s) Oral <User Schedule>  phenytoin   Capsule 200 milliGRAM(s) Oral two times a day  polyethylene glycol 3350 17 Gram(s) Oral daily  predniSONE   Tablet 10 milliGRAM(s) Oral daily  senna 2 Tablet(s) Oral at bedtime  sirolimus 6 milliGRAM(s) Oral <User Schedule>  trimethoprim   80 mG/sulfamethoxazole 400 mG 1 Tablet(s) Oral daily  valGANciclovir 450 milliGRAM(s) Oral <User Schedule>    MEDICATIONS  (PRN):  acetaminophen     Tablet .. 650 milliGRAM(s) Oral every 6 hours PRN Mild Pain (1 - 3)  diphenhydrAMINE 25 milliGRAM(s) Oral at bedtime PRN Insomnia      PAST MEDICAL & SURGICAL HISTORY:  Hypertension      Diabetes      Dyslipidemia      CAD (Coronary Artery Disease)  with Stents in 06/2009 and 6/2019, s/p off-pump C3L on 8/13/20      Hypothyroidism      CVA (cerebral vascular accident)  12/13/19 with residual bilateral weakness      Anemia      PEG (percutaneous endoscopic gastrostomy) status  removed July 2020      Intubation of airway performed without difficulty  Dec 2019  CVA c/b status epilepticus requiring intubation and PEG in 12/2019-      ESRD on dialysis  M/W/F      Seizures  after CVA, last seizure was last week 4/07/22      History of insertion of stent into coronary artery bypass graft  2009 and 2019      S/P CABG x 3  off pump C3L on 8/13/20          Vital Signs Last 24 Hrs  T(C): 36.3 (29 Aug 2022 14:55), Max: 37.3 (29 Aug 2022 12:51)  T(F): 97.4 (29 Aug 2022 14:55), Max: 99.1 (29 Aug 2022 12:51)  HR: 71 (29 Aug 2022 14:55) (71 - 82)  BP: 175/81 (29 Aug 2022 14:55) (154/70 - 175/81)  BP(mean): 104 (29 Aug 2022 05:00) (101 - 104)  RR: 18 (29 Aug 2022 14:55) (18 - 20)  SpO2: 99% (29 Aug 2022 14:55) (94% - 100%)    Parameters below as of 29 Aug 2022 14:55  Patient On (Oxygen Delivery Method): room air        I&O's Summary    28 Aug 2022 07:01  -  29 Aug 2022 07:00  --------------------------------------------------------  IN: 1200 mL / OUT: 100 mL / NET: 1100 mL    29 Aug 2022 07:01  -  29 Aug 2022 15:59  --------------------------------------------------------  IN: 720 mL / OUT: 30 mL / NET: 690 mL                              8.5    8.14  )-----------( 483      ( 29 Aug 2022 06:26 )             26.9     08-29    131<L>  |  91<L>  |  98<H>  ----------------------------<  120<H>  5.3   |  22  |  5.65<H>    Ca    9.1      29 Aug 2022 06:30  Phos  5.6     08-29  Mg     2.1     08-29    TPro  6.8  /  Alb  3.1<L>  /  TBili  0.3  /  DBili  0.2  /  AST  23  /  ALT  23  /  AlkPhos  450<H>  08-29      Review of systems  Gen: No weight changes, fatigue, fevers/chills, weakness  Skin: No rashes  Head/Eyes/Ears/Mouth: No headache; Normal hearing; Normal vision w/o blurriness; No sinus pain/discomfort, sore throat  Respiratory: No dyspnea, cough, wheezing, hemoptysis  CV: No chest pain, PND, orthopnea  GI: Mild abdominal pain at surgical incision site and radiating to Rt flank; denies diarrhea, constipation, nausea, vomiting, melena, hematochezia  : No increased frequency, dysuria, hematuria, nocturia  MSK: No joint pain/swelling; no back pain; no edema  Neuro: No dizziness/lightheadedness, weakness, seizures, numbness, tingling  Heme: No easy bruising or bleeding  Endo: No heat/cold intolerance  Psych: No significant nervousness, anxiety, stress, depression  All other systems were reviewed and are negative, except as noted.      PHYSICAL EXAM:  Constitutional: Well developed / well nourished  Eyes: Anicteric  Respiratory: nonlabored   Cardiovascular: normotensive, regular rate   Gastrointestinal: Soft, non distended, mild tenderness at the incision site and Rt flank, incision well healed   Extremities: SCD's in place and working bilaterally  Neurological: awake, alert, answering questions appropriately   Skin: erythema and localized edema to RLQ and Rt flank with associated warmth.   Musculoskeletal: Moving all extremities  Psychiatric: Responsive

## 2022-08-29 NOTE — PROGRESS NOTE ADULT - NUTRITIONAL ASSESSMENT
This patient has been assessed with a concern for Malnutrition and has been determined to have a diagnosis/diagnoses of Severe protein-calorie malnutrition.    This patient is being managed with:   Diet NPO after Midnight-     NPO Start Date: 29-Aug-2022   NPO Start Time: 23:59  Except Medications  Entered: Aug 29 2022  9:46AM    Diet Consistent Carbohydrate w/Evening Snack-  1000mL Fluid Restriction ([id number])  Supplement Feeding Modality:  Oral  Nepro Cans or Servings Per Day:  3       Frequency:  Daily  Entered: Aug 19 2022  5:52PM

## 2022-08-29 NOTE — PROGRESS NOTE ADULT - ASSESSMENT
Epilepsy, intractable, with resolved status epilepticus  Encephalopathy  S/p renal transplant, ANA  Prior strokes  Atrial fibrillation  CAD  HTN  DM type 2  Transaminitis  Hyponatremia  Leukocytosis  UTI    - Patient with new episodes of perseveration concerning for non-convulsive seizures. Otherwise neurologically improved  - vEEG without focal slowing, epileptiform discharges, and seizures. STOPPED  - As no seizures and tolerating Briviact would start phenytoin taper. Recommend phenytoin 100mg PO BID for 3 days and then STOP  - Continue Briviact with maintenance 50mg IV/PO [id number] and additional 50mg dose s/p HD  - Discussed restarting Eliquis for secondary stroke prevention with family and kidney transplant service. As this interacts with phenytoin would not be as ideal as it would be at lower efficacy. Also not optimal to change AED's given patient has experienced a number of side effects and he has a lower threshold for further seizures. Best options would be either Lovenox or warfarin (adjust to goal INR 2-3) long term. Family and other medical teams are in agreement  - Recent MRI brain w/ and w/o without clear evidence of infection, inflammation, or acute CNS event (possibly minimal enhancement). 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 May consider repeat MRI brain if seizures recur given high blood pressure but less likely  - Continue to address above medical problems, as you are doing  - Will continue to follow patient with you

## 2022-08-29 NOTE — PROGRESS NOTE ADULT - PROBLEM SELECTOR PLAN 1
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 No DSA.     s/p pulse dose steroids. Thymo was not given to treat the rejection as he is too frail and at increased risk for infections. Now with failed allograft function, Remains anuric and is dialysis dependent. Pt underwent IR guided HD catheter exchange on 8/8/22. Last HD done on 8/26/22. Tolerated well.     Labs reviewed. Pt. is still anuric. Plan for HD today. IR notes regarding tunneled HD catheter placement reviewed from 8/24/22. Monitor for labs. Dose meds as per HD.

## 2022-08-29 NOTE — PROGRESS NOTE ADULT - ASSESSMENT
66 year old male with PMH of  DM type II, HTN, CAD s/p CABG in 2020, Afib on Eliquis, CVA in 2019 due to Afib, Seizure d/o (last episode was on 4/8/22), h/o GI bleed in 2/2022 EGD with duodenal ulcer and ESRD on HD s/p R DDRT from DCD donor on 4/21/22 complicated by DGF requiring HD until 4/29/22, later had good graft function who was readmitted with status epilepticus in setting of UTI/antibiotics and sub-therapeutic valproic acid level.  Transferred to SICU on 7/25 with signs of sepsis. 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 Received pulse steroids (Solumedrol 500 on 7/30 -> 250 on 7/31). Currently HD dependent .

## 2022-08-29 NOTE — PROGRESS NOTE ADULT - SUBJECTIVE AND OBJECTIVE BOX
[facility] [place] NEPHROLOGY PRACTICE   MD NINA MASON MD, PA KRISTINE SOLTANPOUR, DO INJUNG KO, NP    TEL:  OFFICE: [phone]    From 5pm-7am Answering Service [phone]    -- RENAL FOLLOW UP NOTE ---Date of Service 08-29-22 @ 14:21    Patient is a 66y old  Male who presents with a chief complaint of Status Epilepticus (29 Aug 2022 12:19)      Patient seen and examined at bedside. No chest pain/sob    VITALS:  T(F): 99.1 (08-29-22 @ 12:51), Max: 99.1 (08-29-22 @ 12:51)  HR: 77 (08-29-22 @ 12:51)  BP: 160/71 (08-29-22 @ 12:51)  RR: 20 (08-29-22 @ 12:51)  SpO2: 98% (08-29-22 @ 12:51)  Wt(kg): --    08-28 @ 07:01  -  08-29 @ 07:00  --------------------------------------------------------  IN: 1200 mL / OUT: 100 mL / NET: 1100 mL    08-29 @ 07:01  -  08-29 @ 14:21  --------------------------------------------------------  IN: 720 mL / OUT: 30 mL / NET: 690 mL          PHYSICAL EXAM:  Constitutional: NAD  Neck: No JVD  Respiratory: CTAB, no wheezes, rales or rhonchi  Cardiovascular: S1, S2, RRR  Gastrointestinal: BS+, soft, NT/ND  Extremities: No peripheral edema    Hospital Medications:   MEDICATIONS  (STANDING):  amLODIPine   Tablet 10 milliGRAM(s) Oral daily  brivaracetam 50 milliGRAM(s) Oral two times a day  brivaracetam 50 milliGRAM(s) Oral once  cephalexin 500 milliGRAM(s) Oral every 12 hours  chlorhexidine 2% Cloths 1 Application(s) Topical <User Schedule>  doxazosin 4 milliGRAM(s) Oral <User Schedule>  epoetin cortney-epbx (RETACRIT) Injectable 55116 Unit(s) SubCutaneous <User Schedule>  finasteride 5 milliGRAM(s) Oral daily  heparin   Injectable 5000 Unit(s) SubCutaneous every 12 hours  hydrALAZINE 75 milliGRAM(s) Oral three times a day  insulin glargine Injectable (LANTUS) 7 Unit(s) SubCutaneous at bedtime  insulin lispro (ADMELOG) corrective regimen sliding scale   SubCutaneous three times a day before meals  insulin lispro (ADMELOG) corrective regimen sliding scale   SubCutaneous at bedtime  insulin lispro Injectable (ADMELOG) 9 Unit(s) SubCutaneous three times a day before meals  levothyroxine 50 MICROGram(s) Oral daily  melatonin 6 milliGRAM(s) Oral at bedtime  multivitamin 1 Tablet(s) Oral daily  nystatin    Suspension 531434 Unit(s) Oral four times a day  pantoprazole    Tablet 40 milliGRAM(s) Oral <User Schedule>  phenytoin   Capsule 200 milliGRAM(s) Oral two times a day  polyethylene glycol 3350 17 Gram(s) Oral daily  predniSONE   Tablet 10 milliGRAM(s) Oral daily  senna 2 Tablet(s) Oral at bedtime  sirolimus 6 milliGRAM(s) Oral <User Schedule>  trimethoprim   80 mG/sulfamethoxazole 400 mG 1 Tablet(s) Oral daily  valGANciclovir 450 milliGRAM(s) Oral <User Schedule>      LABS:  08-29    131<L>  |  91<L>  |  98<H>  ----------------------------<  120<H>  5.3   |  22  |  5.65<H>    Ca    9.1      29 Aug 2022 06:30  Phos  5.6     08-29  Mg     2.1     08-29    TPro  6.8  /  Alb  3.1<L>  /  TBili  0.3  /  DBili  0.2  /  AST  23  /  ALT  23  /  AlkPhos  450<H>  08-29    Creatinine Trend: 5.65 <--, 4.65 <--, 3.59 <--, 4.83 <--, 3.63 <--, 4.84 <--, 3.88 <--    Albumin, Serum: 3.1 g/dL (08-29 @ 06:30)  Phosphorus Level, Serum: 5.6 mg/dL (08-29 @ 06:30)                              8.5    8.14  )-----------( 483      ( 29 Aug 2022 06:26 )             26.9     Urine Studies:  Urinalysis - [08-16-22 @ 18:49]      Color Yellow / Appearance Slightly Turbid / SG 1.018 / pH 8.5      Gluc 200 mg/dL / Ketone Negative  / Bili Negative / Urobili Negative       Blood Large / Protein >600 / Leuk Est Moderate / Nitrite Positive      RBC 13 / WBC 40 / Hyaline 7 / Gran  / Sq Epi  / Non Sq Epi 4 / Bacteria Moderate      Iron 17, TIBC 171, %sat 10      [05-02-22 @ 13:03]  Ferritin 6336      [07-27-22 @ 13:00]  PTH -- (Ca 9.2)      [02-05-22 @ 10:13]   294  HbA1c 6.0      [02-21-20 @ 08:53]  TSH 4.69      [07-06-22 @ 18:36]  Lipid: chol --, , HDL --, LDL --      [07-27-22 @ 13:00]        RADIOLOGY & ADDITIONAL STUDIES:

## 2022-08-29 NOTE — PROGRESS NOTE ADULT - SUBJECTIVE AND OBJECTIVE BOX
[facility] DIVISION OF KIDNEY DISEASES AND HYPERTENSION -- FOLLOW UP NOTE  --------------------------------------------------------------------------------  HPI: 66y old  Male h/o DM type II, HTN, CAD s/p CABG in 2020, Afib on Eliquis, CVA in 2019 due to Afib, Hypothyroidism, Seizure d/o, h/o GI bleed in 2/2022 EGD with duodenal ulcer and ESRD on HD s/p R DDRT from DCD donor on 4/21/22 complicated by DGF requiring HD until 4/29/22, later had good graft function who was readmitted with status epilepticus in setting of UTI/antibiotics and sub-therapeutic valproic acid level.  Envarsus was discontinued for seizures and poor mental status and he was started on belatacept. Subsequent ANA due to rejection. 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 Received pulse steroids (Solumedrol 500 on 7/30 -> 250 on 7/31). Did not receive thymo due to frailty.  Currently HD dependent.     Pt. seen and examined at bedside. Pt. denies any SOB, CP, HA, N/V, abdominal pain, or dizziness. Pt. made ~100cc urine output.      PAST HISTORY  --------------------------------------------------------------------------------  No significant changes to PMH, PSH, FHx, SHx, unless otherwise noted    ALLERGIES & MEDICATIONS  --------------------------------------------------------------------------------  Allergies    No Known Allergies    Intolerances    Standing Inpatient Medications  amLODIPine   Tablet 10 milliGRAM(s) Oral daily  brivaracetam 50 milliGRAM(s) Oral two times a day  brivaracetam 50 milliGRAM(s) Oral once  cephalexin 500 milliGRAM(s) Oral every 12 hours  chlorhexidine 2% Cloths 1 Application(s) Topical <User Schedule>  doxazosin 4 milliGRAM(s) Oral <User Schedule>  epoetin cortney-epbx (RETACRIT) Injectable 33766 Unit(s) SubCutaneous <User Schedule>  finasteride 5 milliGRAM(s) Oral daily  heparin   Injectable 5000 Unit(s) SubCutaneous every 12 hours  hydrALAZINE 75 milliGRAM(s) Oral three times a day  insulin glargine Injectable (LANTUS) 7 Unit(s) SubCutaneous at bedtime  insulin lispro (ADMELOG) corrective regimen sliding scale   SubCutaneous three times a day before meals  insulin lispro (ADMELOG) corrective regimen sliding scale   SubCutaneous at bedtime  insulin lispro Injectable (ADMELOG) 9 Unit(s) SubCutaneous three times a day before meals  levothyroxine 50 MICROGram(s) Oral daily  melatonin 6 milliGRAM(s) Oral at bedtime  multivitamin 1 Tablet(s) Oral daily  nystatin    Suspension 815100 Unit(s) Oral four times a day  pantoprazole    Tablet 40 milliGRAM(s) Oral <User Schedule>  phenytoin   Capsule 200 milliGRAM(s) Oral two times a day  polyethylene glycol 3350 17 Gram(s) Oral daily  predniSONE   Tablet 10 milliGRAM(s) Oral daily  senna 2 Tablet(s) Oral at bedtime  sirolimus 6 milliGRAM(s) Oral <User Schedule>  trimethoprim   80 mG/sulfamethoxazole 400 mG 1 Tablet(s) Oral daily  valGANciclovir 450 milliGRAM(s) Oral <User Schedule>    PRN Inpatient Medications  acetaminophen     Tablet .. 650 milliGRAM(s) Oral every 6 hours PRN  diphenhydrAMINE 25 milliGRAM(s) Oral at bedtime PRN    REVIEW OF SYSTEMS  --------------------------------------------------------------------------------  Gen: No fevers/chills  Respiratory: No dyspnea, cough,   CV: No chest pain,   GI: See HPI,  Transplant: No pain  : No increased frequency,  MSK: No edema  Neuro: No dizziness/lightheadedness    All other systems were reviewed and are negative, except as noted.    VITALS/PHYSICAL EXAM  --------------------------------------------------------------------------------  T(C): 36.5 (08-29-22 @ 09:00), Max: 36.9 (08-29-22 @ 05:00)  HR: 77 (08-29-22 @ 09:00) (77 - 84)  BP: 162/72 (08-29-22 @ 09:00) (130/63 - 173/66)  RR: 20 (08-29-22 @ 09:00) (18 - 20)  SpO2: 99% (08-29-22 @ 09:00) (94% - 100%)  Wt(kg): --    08-28-22 @ 07:01  -  08-29-22 @ 07:00  --------------------------------------------------------  IN: 1200 mL / OUT: 100 mL / NET: 1100 mL    08-29-22 @ 07:01  -  08-29-22 @ 10:55  --------------------------------------------------------  IN: 360 mL / OUT: 30 mL / NET: 330 mL    Physical Exam:  	Gen: NAD, able to speak in full sentences   	HEENT: PERRL, MMM   	Pulm: CTA B/L, no crackles   	CV: RRR, S1S2+  	Abd: +BS, soft, RLQ with erythema and induration overlying the renal transplant site with extension into the groin.               Transplant: No tenderness, swelling  	: No suprapubic tenderness  	MSK: no edema   	Psych: Normal affect and mood  	Skin: Warm              Access: Left IJ non tunneled HD catheter +    LABS/STUDIES  --------------------------------------------------------------------------------              8.5    8.14  >-----------<  483      [08-29-22 @ 06:26]              26.9     131  |  91  |  98  ----------------------------<  120      [08-29-22 @ 06:30]  5.3   |  22  |  5.65        Ca     9.1     [08-29-22 @ 06:30]      Mg     2.1     [08-29-22 @ 06:30]      Phos  5.6     [08-29-22 @ 06:30]    TPro  6.8  /  Alb  3.1  /  TBili  0.3  /  DBili  0.2  /  AST  23  /  ALT  23  /  AlkPhos  450  [08-29-22 @ 06:30]      PTT: 32.4       [08-29-22 @ 06:20]    Creatinine Trend:  SCr 5.65 [08-29 @ 06:30]  SCr 4.65 [08-28 @ 06:27]  SCr 3.59 [08-27 @ 06:40]  SCr 4.83 [08-26 @ 06:16]  SCr 3.63 [08-25 @ 06:55]    Sirolimus (Rapamycin) Level, Serum: 4.6 ng/mL (08-28 @ 06:30)  Sirolimus (Rapamycin) Level, Serum: 5.1 ng/mL (08-27 @ 06:36)  Sirolimus (Rapamycin) Level, Serum: 4.6 ng/mL (08-26 @ 06:16)  Sirolimus (Rapamycin) Level, Serum: 4.2 ng/mL (08-25 @ 06:51)    Urinalysis - [08-16-22 @ 18:49]      Color Yellow / Appearance Slightly Turbid / SG 1.018 / pH 8.5      Gluc 200 mg/dL / Ketone Negative  / Bili Negative / Urobili Negative       Blood Large / Protein >600 / Leuk Est Moderate / Nitrite Positive      RBC 13 / WBC 40 / Hyaline 7 / Gran  / Sq Epi  / Non Sq Epi 4 / Bacteria Moderate    Iron 17, TIBC 171, %sat 10      [05-02-22 @ 13:03]  Ferritin 6336      [07-27-22 @ 13:00]  PTH -- (Ca 9.2)      [02-05-22 @ 10:13]   294  HbA1c 6.0      [02-21-20 @ 08:53]  TSH 4.69      [07-06-22 @ 18:36]  Lipid: chol --, , HDL --, LDL --      [07-27-22 @ 13:00]

## 2022-08-29 NOTE — PROGRESS NOTE ADULT - ASSESSMENT
66M with h/o DM type II, HTN, CAD s/p CABG in 2020, Afib on Eliquis, CVA in 2019 due to Afib, Seizure d/o (last episode was on 4/8/22), h/o GI bleed in 2/2022 EGD with duodenal ulcer and ESRD on HD s/p R DDRT from DCD donor on 4/21/22 complicated by DGF requiring HD until 4/29/22 who presented with status epilepticus in setting of UTI/antibiotics and sub-therapeutic valproic acid level. Transferred to SICU on 7/25 with signs of sepsis, now with refractory seizures. Last reported seizure on 8/16. Mental status continuing to improve.     Seizure Disorder:  - Neuro/Epilepsy Teams following. Avoid Fycompa 2/2 agitation in past  - 1 min seizure with clonic movements noted on EEG 8/16.   - Continue additional anti-seizure medication Briviact 50mg BID with additional 50mg post each HD session  - No further seizures noted.  EEG 8/16-8/18  - prior MRI head 6/27 with less concerns for PRES, likely ischemic changes  - o/n CTH 8/3 with no acute findings  - Follow up with neuro regarding seizure medications recs for discharge      R DCD DDRT 4/21/22 c/b 2A ACR  - 2A ACR: s/p pulse steroids. Continue Prednisone 10mg QD  - HD/UF as per nephrology for fluid overload/anuria, HD on 8/26 w/ 1 pRBC during HD  - 8/14 surveillance BCx negative  - Repeated US from 8/25 did not show any renal artery stenosis, but increased resistive index, consistent with parynchyma disease was noted  - S/P removal of ureteral stent on 7/12  - 8/8 Punch biopsy wound Culture: ngtd. Negative for PTLD  - DVT ppx with SQH  - fungitel negative  - LE studies WNL    RLQ Cellulitis:  - Deferring right psoas muscle/fascia biopsy as wound is not worsening  - Continue Keflex    Immunosuppression:   - Belatacept: 6/23-8/1 discontinued  - Sirolimus 6 mg QD   - Pred 10mg QD  - PPX:  Nystatin, bactrim, valcyte (MWF)    DM  - BG improved on Lantus 7U. Continue pre-meal 9U and ISS.   - Fingerstick ac and qhs  - Endo following    AFib/R IJ DVT   - Eliquis with ~40% less efficacy while on fosphenytoin.    - Lovenox held for ANA and transaminitis   - rate controlled  -  BID; will continue to discuss need for full A/c    HTN:  - Amlodipine/Doxazosin/Hydralazline  - off Coreg due to bradycardia    BPH:  - Continue Doxazosin/Proscar

## 2022-08-29 NOTE — PROGRESS NOTE ADULT - PROBLEM SELECTOR PLAN 5
Pt with B/L LE/UE swelling R>L, doppler studies negative of DVT. Continue Subq Heparin. Cannot use Eliquis given interaction with Dilantin. Edema greatly improving with fluid removal. Last HD done on 8/24/22 and UF yesterday (8/25/22). HD plan as outline above.

## 2022-08-29 NOTE — CHART NOTE - NSCHARTNOTEFT_GEN_A_CORE
Nutrition Follow Up Note  Patient seen for malnutrition follow up    Chart reviewed, events noted.    Source: [x] Patient       [x] EMR        [x] RN        [] Family at bedside       [] Other:    -If unable to interview patient: [] Trach/Vent/BiPAP  [] Disoriented/confused/inappropriate to interview as per chart     Pt confused/disoriented as per documentation, falling asleep during interview - confirmed/completed information with RN. Pt reports good appetite and PO intake. RN confirms pt with good PO intake, states pt does better at breakfast and lunch. Pt reports drinking Nepro 3xday. Denies difficulty chewing/swallowing. Pt denies nausea, vomiting, diarrhea, or constipation, last BM ().     Reviewed the importance of adequate kcal and protein intake. Pt denies having further questions/concerns about diet and nutrition - made aware RD remains available.     Diet Order:   Diet, NPO after Midnight:      NPO Start Date: 29-Aug-2022,   NPO Start Time: 23:59  Except Medications (22)  Diet, Consistent Carbohydrate w/Evening Snack:   1000mL Fluid Restriction ([id number])  Supplement Feeding Modality:  Oral  Nepro Cans or Servings Per Day:  3       Frequency:  Daily (22)    Weights:   Daily Weight in k.6 (-), Weight in k.9 (), Weight in k.8 (-), Weight in k.8 (-), Weight in k.8 (-), Weight in k.5 (-), Weight in k.1 (-) -?accuracy of weight fluctuations as pt with edema and on HD.     Nutritionally Pertinent MEDICATIONS  (STANDING):  amLODIPine   Tablet  cephalexin  doxazosin  finasteride  hydrALAZINE  insulin glargine Injectable (LANTUS)  insulin lispro (ADMELOG) corrective regimen sliding scale  insulin lispro (ADMELOG) corrective regimen sliding scale  insulin lispro Injectable (ADMELOG)  levothyroxine  multivitamin  nystatin    Suspension  pantoprazole    Tablet  polyethylene glycol 3350  predniSONE   Tablet  senna  trimethoprim   80 mG/sulfamethoxazole 400 mG  valGANciclovir    Pertinent Labs: ( @ 06:30): Na 131<L>, BUN 98<H>, Cr 5.65<H>, <H>, K+ 5.3, Phos 5.6<H>, Mg 2.1, Alk Phos 450<H>, ALT/SGPT 23, AST/SGOT 23    A1C with Estimated Average Glucose Result: 6.0 % (22 @ 05:49)  A1C with Estimated Average Glucose Result: 6.6 % (22 @ 17:12)    Finger Sticks:  POCT Blood Glucose.: 206 mg/dL ( @ 12:11)  POCT Blood Glucose.: 124 mg/dL ( @ 08:37)  POCT Blood Glucose.: 125 mg/dL ( @ 21:31)  POCT Blood Glucose.: 239 mg/dL ( @ 18:08)    Skin per nursing documentation: pressure ulcer in sacrum stage 2.   Edema as per flow sheets: +1 right arm (previously +1 left arm, +2 dave. wrist and hand, and +3 dave. foot, ankle, and leg edema).     Estimated Needs:   [x] no change since previous assessment (meets estimated needs for pressure ulcer healing).   [] recalculated:     Previous Nutrition Diagnoses:  [x] Severe Malnutrition   [x] Increased Nutrient Needs     Nutrition Diagnoses are: [x] ongoing - addressed with PO intake encouragement and nutritional supplements.     New Nutrition Diagnosis: [x] Not applicable    Nutrition Care Plan:  [x] In Progress     Nutrition Interventions:     Education Provided:       [x] Yes  [] No    Recommendations:      1. Recommend Consistent Carbohydrate with snack  + no concentrated phosphorus diet + fluids per team, currently 1000 ml fluid restriction. Defer diet/fluid consistencies to medical team/SLP recommendations. Will continue to monitor as able and adjust as needed (monitor need for electrolyte restrictions). Spoke to NP.   2. Continue Nepro 3xday to optimize kcal and protein intake.   3. Encourage PO intake and honor food preferences.   4. Recommend Nephro-Windy (instead of Multivitamin) if medically feasible to optimize nutrient intake and further aid with pressure ulcer healing. Spoke to NP.   5. Reviewed the importance of adequate kcal and protein intake.   6. Continue to obtain weights as able to identify changes if any.     Monitoring and Evaluation:   Continue to monitor nutritional intake, tolerance to diet prescription, weights, labs, skin integrity    RD remains available upon request and will follow up per protocol  Carina Fuller MS RDN CDN [facility] CCTD #[phone].

## 2022-08-30 NOTE — PRE-ANESTHESIA EVALUATION ADULT - NSANTHADDINFOFT_GEN_ALL_CORE
Hemodialysis yesterday, no fluid removed.  Beta blocker taken
Risks and benefits of IV sedation discussed with patient and/or family including n/v, conversion to GA and cardiopulmonary complications.

## 2022-08-30 NOTE — PROCEDURE NOTE - NSPROCNAME_GEN_A_CORE
Feeding Tube Placement
Lumbar Puncture
Arterial Puncture/Cannulation
Central Line Insertion
Interventional Radiology
Midline Insertion
Midline Insertion
Interventional Radiology
Arterial Puncture/Cannulation
Central Line Insertion
Chest Tube
Thoracentesis

## 2022-08-30 NOTE — PROGRESS NOTE ADULT - NSPROGADDITIONALINFOA_GEN_ALL_CORE
-Plan discussed with pt/team. F/u sacral skin break> spoke to team and RN  Contact info: [phone] (24/7). pager [phone]  [email or web address] password [email or web address]  Spent 28 minutes assessing pt/labs/meds and discussing plan of care with primary team  Adjusting insulin  Discharge plan  Follow up care

## 2022-08-30 NOTE — PROGRESS NOTE ADULT - SUBJECTIVE AND OBJECTIVE BOX
DIABETES FOLLOW UP NOTE: Saw pt earlier today    Chief Complaint: Endocrine consult requested for management of T2DM    INTERVAL HX: Pt stable, reports tolerating POs with BG levels variable depending on PO intake. Noted FBG in 90s today> pt denies any s/sx of hypoglycemia. Pt stated he had breakfast this am but per RN pt was NPO today for breakfast and lunch for perme cath insertion. Denies CP/SOB reported. Tolerating HD. On stable Prednisone 10mg daily       Review of Systems:  General: As above. Question pt memory since he stated he ate today but he was NPO.   Cardiovascular: No chest pain, palpitations  Respiratory: No SOB, no cough  GI: No nausea, vomiting, abdominal pain  Endocrine: no polyuria, polydipsia or S&Sx of hypoglycemia    Allergies    No Known Allergies    Intolerances      MEDICATIONS:  cephalexin 500 milliGRAM(s) Oral every 12 hours  finasteride 5 milliGRAM(s) Oral daily  insulin glargine Injectable (LANTUS) 7 Unit(s) SubCutaneous at bedtime  insulin lispro (ADMELOG) corrective regimen sliding scale   SubCutaneous three times a day before meals  insulin lispro (ADMELOG) corrective regimen sliding scale   SubCutaneous at bedtime  insulin lispro Injectable (ADMELOG) 9 Unit(s) SubCutaneous three times a day before meals  levothyroxine 50 MICROGram(s) Oral daily  predniSONE   Tablet 10 milliGRAM(s) Oral daily  trimethoprim   80 mG/sulfamethoxazole 400 mG 1 Tablet(s) Oral daily  valGANciclovir 450 milliGRAM(s) Oral <User Schedule>      PHYSICAL EXAM:  VITALS: T(C): 36.6 (08-30-22 @ 16:20)  T(F): 97.9 (08-30-22 @ 16:20), Max: 98.9 (08-30-22 @ 05:00)  HR: 63 (08-30-22 @ 16:50) (62 - 80)  BP: 153/82 (08-30-22 @ 16:50) (142/69 - 174/74)  RR:  (16 - 20)  SpO2:  (95% - 100%)  Wt(kg): --  GENERAL: Thin male laying in bed in NAD  Abdomen: Soft, nontender, non distended  Extremities: Warm, no edema in all 4 exts  NEURO: Alert and answers questions but appears forgetful.       LABS:  POCT Blood Glucose.: 106 mg/dL (08-30-22 @ 17:21)  POCT Blood Glucose.: 120 mg/dL (08-30-22 @ 12:12)  POCT Blood Glucose.: 112 mg/dL (08-30-22 @ 09:16)  POCT Blood Glucose.: 92 mg/dL (08-29-22 @ 21:10)  POCT Blood Glucose.: 111 mg/dL (08-29-22 @ 17:23)  POCT Blood Glucose.: 206 mg/dL (08-29-22 @ 12:11)  POCT Blood Glucose.: 124 mg/dL (08-29-22 @ 08:37)  POCT Blood Glucose.: 125 mg/dL (08-28-22 @ 21:31)  POCT Blood Glucose.: 239 mg/dL (08-28-22 @ 18:08)  POCT Blood Glucose.: 204 mg/dL (08-28-22 @ 12:18)  POCT Blood Glucose.: 134 mg/dL (08-28-22 @ 08:45)  POCT Blood Glucose.: 122 mg/dL (08-27-22 @ 21:15)  POCT Blood Glucose.: 228 mg/dL (08-27-22 @ 17:39)                            8.9    7.24  )-----------( 463      ( 30 Aug 2022 06:17 )             27.6       08-30    131<L>  |  89<L>  |  69<H>  ----------------------------<  93  4.5   |  25  |  3.99<H>    eGFR: 16<L>    Ca    8.8      08-30  Mg     2.0     08-30  Phos  4.4     08-30    TPro  7.1  /  Alb  3.0<L>  /  TBili  0.3  /  DBili  0.2  /  AST  18  /  ALT  25  /  AlkPhos  424<H>  08-30      A1C with Estimated Average Glucose Result: 6.0 % (06-30-22 @ 05:49)      Estimated Average Glucose: 126 mg/dL (06-30-22 @ 05:49)        07-27 Chol -- Direct LDL -- LDL calculated -- HDL -- Trig 118

## 2022-08-30 NOTE — PROGRESS NOTE ADULT - SUBJECTIVE AND OBJECTIVE BOX
no new complaints, denies pain     REVIEW OF SYSTEMS  Constitutional - No fever,  No fatigue  HEENT - No vertigo, No neck pain  Neurological - No headaches, No memory loss, No loss of strength, No numbness, No tremors  Skin - No rashes, No lesions   Musculoskeletal - No joint pain, No joint swelling, No muscle pain  Psychiatric - No depression, No anxiety    FUNCTIONAL PROGRESS  8/29 PT  bed mobility mod assist  tranfsers mod assist   gait mod assist x 5 feet  sitting balance x 10 min, min assist     8/29 OT  transfers mod assist  dressing mod assist   grooming/eating min assist    VITALS  T(C): 36.4 (08-30-22 @ 09:37), Max: 37.3 (08-29-22 @ 12:51)  HR: 78 (08-30-22 @ 09:37) (71 - 80)  BP: 149/67 (08-30-22 @ 09:37) (142/69 - 175/81)  RR: 20 (08-30-22 @ 09:37) (18 - 20)  SpO2: 95% (08-30-22 @ 09:37) (95% - 99%)  Wt(kg): --    MEDICATIONS   acetaminophen     Tablet .. 650 milliGRAM(s) every 6 hours PRN  amLODIPine   Tablet 10 milliGRAM(s) daily  brivaracetam 50 milliGRAM(s) two times a day  cephalexin 500 milliGRAM(s) every 12 hours  chlorhexidine 2% Cloths 1 Application(s) <User Schedule>  diphenhydrAMINE 25 milliGRAM(s) at bedtime PRN  doxazosin 4 milliGRAM(s) <User Schedule>  epoetin cortney-epbx (RETACRIT) Injectable 22507 Unit(s) <User Schedule>  finasteride 5 milliGRAM(s) daily  hydrALAZINE 75 milliGRAM(s) three times a day  insulin glargine Injectable (LANTUS) 7 Unit(s) at bedtime  insulin lispro (ADMELOG) corrective regimen sliding scale   three times a day before meals  insulin lispro (ADMELOG) corrective regimen sliding scale   at bedtime  insulin lispro Injectable (ADMELOG) 9 Unit(s) three times a day before meals  levothyroxine 50 MICROGram(s) daily  melatonin 6 milliGRAM(s) at bedtime  multivitamin 1 Tablet(s) daily  nystatin    Suspension 607257 Unit(s) four times a day  pantoprazole    Tablet 40 milliGRAM(s) <User Schedule>  phenytoin   Capsule 200 milliGRAM(s) two times a day  polyethylene glycol 3350 17 Gram(s) daily  predniSONE   Tablet 10 milliGRAM(s) daily  senna 2 Tablet(s) at bedtime  sirolimus 6 milliGRAM(s) <User Schedule>  trimethoprim   80 mG/sulfamethoxazole 400 mG 1 Tablet(s) daily  valGANciclovir 450 milliGRAM(s) <User Schedule>      RECENT LABS - Reviewed                        8.9    7.24  )-----------( 463      ( 30 Aug 2022 06:17 )             27.6     08-30    131<L>  |  89<L>  |  69<H>  ----------------------------<  93  4.5   |  25  |  3.99<H>    Ca    8.8      30 Aug 2022 06:17  Phos  4.4     08-30  Mg     2.0     08-30    TPro  7.1  /  Alb  3.0<L>  /  TBili  0.3  /  DBili  0.2  /  AST  18  /  ALT  25  /  AlkPhos  424<H>  08-30    PTT - ( 30 Aug 2022 06:17 )  PTT:32.5 sec      ----------------------------------------------------------------------------------------  PHYSICAL EXAM  Constitutional - NAD, Comfortable, in bed  Chest - Breathing comfortably  Cardiovascular - S1S2   Abdomen - Soft   Extremities - No C/C/E, No calf tenderness   Neurologic Exam -  follows commands                   Cognitive - Awake, Alert, AAO to self, place, month     Communication - Fluent, No dysarthria        Motor - 4/5     Sensory - Intact to LT    Psychiatric - Mood stable, Affect WNL  ----------------------------------------------------------------------------------------  ASSESSMENT/PLAN  66yMale h/o DM type II, HTN, CAD s/p CABG in 2020, Afib on Eliquis, CVA in 2019 due to Afib, Seizure d/o (last episode was on 4/8/22), h/o GI bleed in 2/2022 EGD with duodenal ulcer and ESRD on HD s/p R DDRT with functional deficits after UTI, status epilepticus, with debility   seizures, on briviact, fosphenytoin taper   transferred to SICU 7/25, biopsy with rejection, received steroids, anuric on HD   cellulitis, retroperitoneal hematoma, on cephalexin    Pain - Tylenol  heparin sq   Rehab -   continue bedside therapy  out of bed to chair daily   Recommend ACUTE inpatient rehabilitation for the functional deficits consisting of 3 hours of therapy/day & 24 hour RN/daily PMR physician for comorbid medical management. Will continue to follow for ongoing rehab needs and recommendations. Patient will be able to tolerate 3 hours a day.

## 2022-08-30 NOTE — PRE-ANESTHESIA EVALUATION ADULT - NSANTHPMHFT_GEN_ALL_CORE
67 yo M h/o CAD s/p CABG, HTN, DM, CVA, seizures, ESRD s/p renal txp (Cr 2) c/b perinephric hematoma s/p evacuation with recent right hemothorax requiring multiple transfusions and intubation here for right VATS  Now extubated on RA, stable Hb, not on any pressor
66M with PMH: DM type II, HTN, CAD s/p CABG in 2020, AFib on Eliquis, CVA in 2019 due to Afib, Seizure d/o following CVA, last episode was on 4/8/22, h/o GIB in 2/2022 EGD with duodenal ulcer.  ESRD due to DM was on HD since 2019.     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  Donor was 58 , KDPI 82%, DCD, single vessels and ureter, HLA mismatch 1, 2, 2. No DSA, cPRA 0%. 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 Re-admitted in May for anemia in the setting of large perinephric hematoma s/p evacuation and repair of arterial anastomosis on 5/2/22. Intra operative biopsy showed no rejection, Creatinine ranging ~2mg/dL.    hopsital course c/b seiuzres, pleural effusion/hemothorax req. blood transfusions, renal failure.

## 2022-08-30 NOTE — PROGRESS NOTE ADULT - ASSESSMENT
66 yr old Male with PMHx ESRD s/p permacath removal 5/10/22 s/p Rt DDRT 4/21/22,  CVA (2019), Afib on apixaban, seizure activity, CAD s/p stents, CABG (2020), HTN, HLD, DM on insulin, gastric/duodenal ulcer, recent hospitalization 4/28/22 -5/10/22 with weakness/anemia found to have perinephric hematoma requiring evacuation and repair of bleeding arterial anastomosis. Curently being treated for UTI with Levaquin Today after developing multiple sz episodes refractory to 5 mg versed IM (EMS) and 2 mg ativan IV in E.D. concerning for status epilepticus requiring intubation for hypoxic respiratory  failure. MICU Consult was called for hypoxic respiratory failure secondary to status epilepticus.  Nephrology consulted for renal failure.     A/P  DCD KTX on 04/21/22  Course complicated by DGF, was on HD until 4/29/22. Readmitted in May for anemia in the setting of large perinephric hematoma s/p evacuation and repair of arterial anastomosis on 5/02/22. Intra operative biopsy showed no rejection.  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   Now with failed allograft function, Remains dialysis dependent.   continue Immunosuppression per transplant team  IR planning per Perma cath placement today   HD tomorrow - RRT per transplant team   transplant team on board   monitor BMP, U/O     Immunosuppression per transplant team  - Belatacept: 6/23-8/1/2022 discontinued  - on Sirolimus since 8/1/2022 based on level, Cellcept 250 BID, Prednisone 10 mg QD 8/16  - PPX:  Nystatin, bactrim, valcyte (MWF)      HTN   BP manage by transplant team    monitor BP closely

## 2022-08-30 NOTE — PROCEDURE NOTE - NSPERFORMEDBY_GEN_A_CORE
Myself
SHU MNOTES DE OCA RN/Other
Myself
Dr. Cortes Fortune/Attending
Myself
Yodit RAZA NP/NP
Myself
Myself

## 2022-08-30 NOTE — PROGRESS NOTE ADULT - ATTENDING COMMENTS
Pt stable.  Has ANA - going for permacath.  Plan for d/c to rehab  Increase sirolimus dose to 7mgs.  cont pred 10.  Was on belatacept.  cellulitis improved.

## 2022-08-30 NOTE — PROCEDURE NOTE - NSASSISTBY_GEN_A_CORE
Lucho/PA
Kenia Salazar/Myself/Resident
MS4/Other
Attending
Resident
Yodit RAZA NP/NP
Dr. Martin/Attending

## 2022-08-30 NOTE — PROGRESS NOTE ADULT - SUBJECTIVE AND OBJECTIVE BOX
Interventional Radiology    HPI: 66y Male with ESRD on HD with temp catheter for conversion to tunneled HD catheter.    Allergies:   Medications (Abx/Cardiac/Anticoagulation/Blood Products)  amLODIPine   Tablet: 10 milliGRAM(s) Oral ( @ 05:28)  cephalexin: 500 milliGRAM(s) Oral ( @ 05:28)  doxazosin: 4 milliGRAM(s) Oral ( @ 05:27)  heparin   Injectable: 5000 Unit(s) SubCutaneous ( @ 18:19)  hydrALAZINE: 75 milliGRAM(s) Oral ( @ 13:20)  nystatin    Suspension: 726219 Unit(s) Oral ( @ 13:17)  trimethoprim   80 mG/sulfamethoxazole 400 m Tablet(s) Oral ( @ 13:18)  valGANciclovir: 450 milliGRAM(s) Oral ( @ 13:05)    Data:  177.8  61.7  T(C): 36.4  HR: 76  BP: 167/67  RR: 20  SpO2: 100%    Exam  General: No acute distress  Chest: Non labored breathing  Abdomen: Non-distended  Extremities: No swelling, warm    -WBC 7.24 / HgB 8.9 / Hct 27.6 / Plt 463  -Na 131 / Cl 89 / BUN 69 / Glucose 93  -K 4.5 / CO2 25 / Cr 3.99  -ALT 25 / Alk Phos 424 / T.Bili 0.3    Imaging:     Plan: 66y Male presents for tunneled HD catheter.  -Risks/Benefits/alternatives explained with the patient and/or healthcare proxy and witnessed informed consent obtained.

## 2022-08-30 NOTE — PROGRESS NOTE ADULT - NUTRITIONAL ASSESSMENT
Diet, Consistent Carbohydrate w/Evening Snack:   1000mL Fluid Restriction ([id number])  No Concentrated Phosphorus  Supplement Feeding Modality:  Oral  Nepro Cans or Servings Per Day:  3       Frequency:  Daily (08-29-22 @ 16:09) [Active]    Please see RD assessment and/or follow up.  Managed by primary team as well

## 2022-08-30 NOTE — PROCEDURE NOTE - PROCEDURE DATE TIME, MLM
27-Jul-2022 16:44
27-Jul-2022 17:25
10-Raffi-2022 22:24
29-Jul-2022 15:29
11-Jun-2022 00:19
30-Aug-2022 16:27
25-Jun-2022 21:30
26-Jun-2022 17:09
29-Jun-2022 13:24
08-Aug-2022 13:26
11-Jun-2022 21:18
11-Jun-2022 23:31

## 2022-08-30 NOTE — PROGRESS NOTE ADULT - PROBLEM SELECTOR PLAN 5
Pt with B/L LE/UE swelling R>L, doppler studies negative of DVT. Continue Subq Heparin. Cannot use Eliquis given interaction with Dilantin. Edema greatly improving with fluid removal. Last HD done on 8/24/22 and UF yesterday (8/25/22). HD plan as outline above Pt with B/L LE/UE swelling R>L, doppler studies negative of DVT. Continue Subq Heparin. Cannot use Eliquis given interaction with Dilantin. Edema greatly improving with fluid removal. Last HD done on 8/29/22. HD plan as outline above

## 2022-08-30 NOTE — PROGRESS NOTE ADULT - SUBJECTIVE AND OBJECTIVE BOX
[facility] – [place], [place] NE[facility]ROLOGY PRACTICE   MD NINA MASON MD, PA KRISTINE SOLTANPOUR, DO INJUNG KO, NP    TEL:  OFFICE: [phone]    From 5pm-7am Answering Service [phone]    -- RENAL FOLLOW UP NOTE ---Date of Service 08-30-22 @ 14:24    Patient is a 66y old  Male who presents with a chief complaint of Status Epilepticus (30 Aug 2022 12:15)      Patient seen and examined at bedside. No chest pain/sob    VITALS:  T(F): 97.6 (08-30-22 @ 12:51), Max: 98.9 (08-30-22 @ 05:00)  HR: 76 (08-30-22 @ 12:51)  BP: 167/67 (08-30-22 @ 12:51)  RR: 20 (08-30-22 @ 12:51)  SpO2: 100% (08-30-22 @ 12:51)  Wt(kg): --    08-29 @ 07:01  -  08-30 @ 07:00  --------------------------------------------------------  IN: 2480 mL / OUT: 2880 mL / NET: -400 mL      Height (cm): 177.8 (08-30 @ 09:37)  Weight (kg): 61.7 (08-30 @ 09:37)  BMI (kg/m2): 19.5 (08-30 @ 09:37)  BSA (m2): 1.77 (08-30 @ 09:37)    PHYSICAL EXAM:  Constitutional: NAD  Neck: No JVD  Respiratory: CTAB, no wheezes, rales or rhonchi  Cardiovascular: S1, S2, RRR  Gastrointestinal: BS+, soft, NT/ND  Extremities: No peripheral edema    Hospital Medications:   MEDICATIONS  (STANDING):  amLODIPine   Tablet 10 milliGRAM(s) Oral daily  artificial  tears Solution 2 Drop(s) Left EYE every 6 hours  brivaracetam 50 milliGRAM(s) Oral two times a day  cephalexin 500 milliGRAM(s) Oral every 12 hours  chlorhexidine 2% Cloths 1 Application(s) Topical <User Schedule>  doxazosin 4 milliGRAM(s) Oral <User Schedule>  epoetin cortney-epbx (RETACRIT) Injectable 40789 Unit(s) SubCutaneous <User Schedule>  finasteride 5 milliGRAM(s) Oral daily  hydrALAZINE 75 milliGRAM(s) Oral three times a day  insulin glargine Injectable (LANTUS) 7 Unit(s) SubCutaneous at bedtime  insulin lispro (ADMELOG) corrective regimen sliding scale   SubCutaneous three times a day before meals  insulin lispro (ADMELOG) corrective regimen sliding scale   SubCutaneous at bedtime  insulin lispro Injectable (ADMELOG) 9 Unit(s) SubCutaneous three times a day before meals  levothyroxine 50 MICROGram(s) Oral daily  melatonin 6 milliGRAM(s) Oral at bedtime  multivitamin 1 Tablet(s) Oral daily  nystatin    Suspension 193598 Unit(s) Oral four times a day  pantoprazole    Tablet 40 milliGRAM(s) Oral <User Schedule>  phenytoin   Capsule 200 milliGRAM(s) Oral two times a day  polyethylene glycol 3350 17 Gram(s) Oral daily  predniSONE   Tablet 10 milliGRAM(s) Oral daily  senna 2 Tablet(s) Oral at bedtime  sirolimus 1 milliGRAM(s) Oral once  trimethoprim   80 mG/sulfamethoxazole 400 mG 1 Tablet(s) Oral daily  valGANciclovir 450 milliGRAM(s) Oral <User Schedule>      LABS:  08-30    131<L>  |  89<L>  |  69<H>  ----------------------------<  93  4.5   |  25  |  3.99<H>    Ca    8.8      30 Aug 2022 06:17  Phos  4.4     08-30  Mg     2.0     08-30    TPro  7.1  /  Alb  3.0<L>  /  TBili  0.3  /  DBili  0.2  /  AST  18  /  ALT  25  /  AlkPhos  424<H>  08-30    Creatinine Trend: 3.99 <--, 5.65 <--, 4.65 <--, 3.59 <--, 4.83 <--, 3.63 <--, 4.84 <--    Albumin, Serum: 3.0 g/dL (08-30 @ 06:17)  Phosphorus Level, Serum: 4.4 mg/dL (08-30 @ 06:17)                              8.9    7.24  )-----------( 463      ( 30 Aug 2022 06:17 )             27.6     Urine Studies:  Urinalysis - [08-16-22 @ 18:49]      Color Yellow / Appearance Slightly Turbid / SG 1.018 / pH 8.5      Gluc 200 mg/dL / Ketone Negative  / Bili Negative / Urobili Negative       Blood Large / Protein >600 / Leuk Est Moderate / Nitrite Positive      RBC 13 / WBC 40 / Hyaline 7 / Gran  / Sq Epi  / Non Sq Epi 4 / Bacteria Moderate      Iron 17, TIBC 171, %sat 10      [05-02-22 @ 13:03]  Ferritin 6336      [07-27-22 @ 13:00]  PTH -- (Ca 9.2)      [02-05-22 @ 10:13]   294  HbA1c 6.0      [02-21-20 @ 08:53]  TSH 4.69      [07-06-22 @ 18:36]  Lipid: chol --, , HDL --, LDL --      [07-27-22 @ 13:00]        RADIOLOGY & ADDITIONAL STUDIES:

## 2022-08-30 NOTE — PROGRESS NOTE ADULT - PROBLEM SELECTOR PLAN 1
-Test BG AC/HS  -Decrease Lantus dose to 6 units QHS  -Change Admelog to 8 units ac meals and administer 5 units for BG <120 if pt eats. HOLD IF PT NOT EATING.    -c/w Admelog low correction scale AC and low HS scale  -notify endocrine team if steroids changed as insulin regimen will need adjustment  Discharge planning:   - plan to rehab, likely will need basal bolus insulin final doses tbd  Outpatient f/u with Endocrinologist Dr. Lincoln. [address]. Phone  Needs apt at time of discharge  -Needs optho/renal/cardiac f/u as out pt  -Make sure pt has insulin and DM supplies at time of discharge.

## 2022-08-30 NOTE — PROCEDURE NOTE - NSINFORMCONSENT_GEN_A_CORE
Benefits, risks, and possible complications of procedure explained to patient/caregiver who verbalized understanding and gave verbal consent.
Benefits, risks, and possible complications of procedure explained to patient/caregiver who verbalized understanding and gave verbal consent.
Benefits, risks, and possible complications of procedure explained to patient/caregiver who verbalized understanding and gave written consent.
This was an emergent procedure.
Benefits, risks, and possible complications of procedure explained to patient/caregiver who verbalized understanding and gave written consent.

## 2022-08-30 NOTE — PROGRESS NOTE ADULT - PROBLEM SELECTOR PLAN 1
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 No DSA.     s/p pulse dose steroids. Thymo was not given to treat the rejection as he is too frail and at increased risk for infections. Now with failed allograft function, Remains anuric and is dialysis dependent. Pt underwent IR guided HD catheter exchange on 8/8/22. Last HD done on 8/29/22. Tolerated well.     Labs reviewed. Pt. is still anuric. Plan for HD tomorrow. Pt. planned for tunneled HD catheter placement today. Monitor for labs. Dose meds as per HD.

## 2022-08-30 NOTE — PRE-ANESTHESIA EVALUATION ADULT - NSPROPOSEDPROCEDFT_GEN_ALL_CORE
Dilaysis catheter placement
Right VATS, evacuation of hemothorax, possible decortication
renal biopsy and nephrostomy with IR guidance

## 2022-08-30 NOTE — PROGRESS NOTE ADULT - NUTRITIONAL ASSESSMENT
This patient has been assessed with a concern for Malnutrition and has been determined to have a diagnosis/diagnoses of Severe protein-calorie malnutrition.    This patient is being managed with:   Diet Consistent Carbohydrate w/Evening Snack-  1000mL Fluid Restriction ([id number])  No Concentrated Phosphorus  Supplement Feeding Modality:  Oral  Nepro Cans or Servings Per Day:  3       Frequency:  Daily  Entered: Aug 29 2022  4:09PM    Diet NPO after Midnight-     NPO Start Date: 29-Aug-2022   NPO Start Time: 23:59  Except Medications  Entered: Aug 29 2022  9:46AM

## 2022-08-30 NOTE — PROGRESS NOTE ADULT - SUBJECTIVE AND OBJECTIVE BOX
[facility], [place] DIVISION OF KIDNEY DISEASES AND HYPERTENSION -- FOLLOW UP NOTE  --------------------------------------------------------------------------------  HPI: 66y old  Male h/o DM type II, HTN, CAD s/p CABG in 2020, Afib on Eliquis, CVA in 2019 due to Afib, Hypothyroidism, Seizure d/o, h/o GI bleed in 2/2022 EGD with duodenal ulcer and ESRD on HD s/p R DDRT from DCD donor on 4/21/22 complicated by DGF requiring HD until 4/29/22, later had good graft function who was readmitted with status epilepticus in setting of UTI/antibiotics and sub-therapeutic valproic acid level.  Envarsus was discontinued for seizures and poor mental status and he was started on belatacept. Subsequent ANA due to rejection. 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 Received pulse steroids (Solumedrol 500 on 7/30 -> 250 on 7/31). Did not receive thymo due to frailty. Currently HD dependent.     Pt. seen and examined at bedside. Pt. denies any SOB, CP, HA, N/V, abdominal pain, or dizziness. HD done yesterday (8/29/22). Tolerated well. Plan for Tunneled HD catheter placement.     PAST HISTORY  --------------------------------------------------------------------------------  No significant changes to PMH, PSH, FHx, SHx, unless otherwise noted    ALLERGIES & MEDICATIONS  --------------------------------------------------------------------------------  Allergies    No Known Allergies    Intolerances    Standing Inpatient Medications  amLODIPine   Tablet 10 milliGRAM(s) Oral daily  brivaracetam 50 milliGRAM(s) Oral two times a day  cephalexin 500 milliGRAM(s) Oral every 12 hours  chlorhexidine 2% Cloths 1 Application(s) Topical <User Schedule>  doxazosin 4 milliGRAM(s) Oral <User Schedule>  epoetin cortney-epbx (RETACRIT) Injectable 61374 Unit(s) SubCutaneous <User Schedule>  finasteride 5 milliGRAM(s) Oral daily  heparin   Injectable 5000 Unit(s) SubCutaneous every 12 hours  hydrALAZINE 75 milliGRAM(s) Oral three times a day  insulin glargine Injectable (LANTUS) 7 Unit(s) SubCutaneous at bedtime  insulin lispro (ADMELOG) corrective regimen sliding scale   SubCutaneous three times a day before meals  insulin lispro (ADMELOG) corrective regimen sliding scale   SubCutaneous at bedtime  insulin lispro Injectable (ADMELOG) 9 Unit(s) SubCutaneous three times a day before meals  levothyroxine 50 MICROGram(s) Oral daily  melatonin 6 milliGRAM(s) Oral at bedtime  multivitamin 1 Tablet(s) Oral daily  nystatin    Suspension 858967 Unit(s) Oral four times a day  pantoprazole    Tablet 40 milliGRAM(s) Oral <User Schedule>  phenytoin   Capsule 200 milliGRAM(s) Oral two times a day  polyethylene glycol 3350 17 Gram(s) Oral daily  predniSONE   Tablet 10 milliGRAM(s) Oral daily  senna 2 Tablet(s) Oral at bedtime  sirolimus 6 milliGRAM(s) Oral <User Schedule>  trimethoprim   80 mG/sulfamethoxazole 400 mG 1 Tablet(s) Oral daily  valGANciclovir 450 milliGRAM(s) Oral <User Schedule>    PRN Inpatient Medications  acetaminophen     Tablet .. 650 milliGRAM(s) Oral every 6 hours PRN  diphenhydrAMINE 25 milliGRAM(s) Oral at bedtime PRN    REVIEW OF SYSTEMS  --------------------------------------------------------------------------------  Gen: No fevers/chills  Respiratory: No dyspnea, cough,   CV: No chest pain,   GI: See HPI,  Transplant: No pain  : No increased frequency,  MSK: No edema  Neuro: No dizziness/lightheadedness    All other systems were reviewed and are negative, except as noted.    VITALS/PHYSICAL EXAM  --------------------------------------------------------------------------------  T(C): 37.2 (08-30-22 @ 05:00), Max: 37.3 (08-29-22 @ 12:51)  HR: 79 (08-30-22 @ 05:00) (71 - 80)  BP: 159/70 (08-30-22 @ 05:00) (142/69 - 175/81)  RR: 18 (08-30-22 @ 05:00) (18 - 20)  SpO2: 96% (08-30-22 @ 05:00) (96% - 99%)  Wt(kg): --    08-29-22 @ 07:01  -  08-30-22 @ 07:00  --------------------------------------------------------  IN: 2480 mL / OUT: 2880 mL / NET: -400 mL    Physical Exam:  	Gen: NAD, able to speak in full sentences   	HEENT: PERRL, MMM   	Pulm: CTA B/L, no crackles   	CV: RRR, S1S2+  	Abd: +BS, soft, RLQ with erythema and induration overlying the renal transplant site with extension into the groin.               Transplant: No tenderness, swelling  	: No suprapubic tenderness  	MSK: no edema   	Psych: Normal affect and mood  	Skin: Warm              Access: Left IJ non tunneled HD catheter +    LABS/STUDIES  --------------------------------------------------------------------------------              8.9    7.24  >-----------<  463      [08-30-22 @ 06:17]              27.6     131  |  89  |  69  ----------------------------<  93      [08-30-22 @ 06:17]  4.5   |  25  |  3.99        Ca     8.8     [08-30-22 @ 06:17]      Mg     2.0     [08-30-22 @ 06:17]      Phos  4.4     [08-30-22 @ 06:17]    TPro  7.1  /  Alb  3.0  /  TBili  0.3  /  DBili  0.2  /  AST  18  /  ALT  25  /  AlkPhos  424  [08-30-22 @ 06:17]    PTT: 32.5       [08-30-22 @ 06:17]    Creatinine Trend:  SCr 3.99 [08-30 @ 06:17]  SCr 5.65 [08-29 @ 06:30]  SCr 4.65 [08-28 @ 06:27]  SCr 3.59 [08-27 @ 06:40]  SCr 4.83 [08-26 @ 06:16]    Sirolimus (Rapamycin) Level, Serum: 3.9 ng/mL (08-29 @ 06:34)  Sirolimus (Rapamycin) Level, Serum: 4.6 ng/mL (08-28 @ 06:30)  Sirolimus (Rapamycin) Level, Serum: 5.1 ng/mL (08-27 @ 06:36)  Sirolimus (Rapamycin) Level, Serum: 4.6 ng/mL (08-26 @ 06:16)    Urinalysis - [08-16-22 @ 18:49]      Color Yellow / Appearance Slightly Turbid / SG 1.018 / pH 8.5      Gluc 200 mg/dL / Ketone Negative  / Bili Negative / Urobili Negative       Blood Large / Protein >600 / Leuk Est Moderate / Nitrite Positive      RBC 13 / WBC 40 / Hyaline 7 / Gran  / Sq Epi  / Non Sq Epi 4 / Bacteria Moderate    Iron 17, TIBC 171, %sat 10      [05-02-22 @ 13:03]  Ferritin 6336      [07-27-22 @ 13:00]  PTH -- (Ca 9.2)      [02-05-22 @ 10:13]   294  HbA1c 6.0      [02-21-20 @ 08:53]  TSH 4.69      [07-06-22 @ 18:36]  Lipid: chol --, , HDL --, LDL --      [07-27-22 @ 13:00]

## 2022-08-30 NOTE — PROGRESS NOTE ADULT - ASSESSMENT
66M with h/o DM type II, HTN, CAD s/p CABG in 2020, Afib on Eliquis, CVA in 2019 due to Afib, Seizure d/o (last episode was on 4/8/22), h/o GI bleed in 2/2022 EGD with duodenal ulcer and ESRD on HD s/p R DDRT from DCD donor on 4/21/22 complicated by DGF requiring HD until 4/29/22 who presented with status epilepticus in setting of UTI/antibiotics and sub-therapeutic valproic acid level. Transferred to SICU on 7/25 with signs of sepsis, now with refractory seizures. Last reported seizure on 8/16. Mental status continuing to improve.     Seizure Disorder:  - Neuro/Epilepsy Teams following. Avoid Fycompa 2/2 agitation in past  - 1 min seizure with clonic movements noted on EEG 8/16. No further seizures noted.  EEG 8/16-8/18  - Continue additional anti-seizure medication Briviact 50mg BID with additional 50mg post each HD session  - On Phenytoin and Briviact.   -  Appreciate neuro recommendations to wean and discontinue Phenytoin and to continue Briviact.  Given prolonged hospital course and current stable regimen, would prefer to make medication adjustments as outpatient.  Will discuss with Neuro  - prior MRI head 6/27 with less concerns for PRES, likely ischemic changes  - o/n CTH 8/3 with no acute findings  - Follow up with neuro regarding seizure medications recs for discharge      R DCD DDRT 4/21/22 c/b 2A ACR  - 2A ACR: s/p pulse steroids. Continue Prednisone 10mg QD  - HD/UF as per nephrology for fluid overload/anuria  - 8/14 surveillance BCx negative  - Repeated US from 8/25 did not show any renal artery stenosis, but increased resistive index, consistent with parynchyma disease was noted  - S/P removal of ureteral stent on 7/12  - 8/8 Punch biopsy wound Culture: ngtd. Negative for PTLD  - DVT ppx with SQH  - fungitel negative  - LE studies WNL    RLQ Cellulitis:  - Deferring right psoas muscle/fascia biopsy as wound is not worsening  - Continue Keflex    Immunosuppression:   - Belatacept: 6/23-8/1 discontinued  - Sirolimus 6 mg QD   - Pred 10mg QD  - PPX:  Nystatin, bactrim, valcyte (MWF)    DM  - Labile blood glucose  - On Lantus 7U and pre-meal 9U with ISS coverage  - Fingerstick ac and qhs  - Endo recs appreciated    AFib/R IJ DVT   - Eliquis with ~40% less efficacy while on fosphenytoin.    - Lovenox held for ANA and transaminitis   - rate controlled  -  BID; will continue to discuss need for full A/c    HTN:  - Amlodipine/Doxazosin/Hydralazline  - off Coreg due to bradycardia    BPH:  - Continue Doxazosin/Proscar

## 2022-08-30 NOTE — PROGRESS NOTE ADULT - SUBJECTIVE AND OBJECTIVE BOX
Transplant Surgery - Multidisciplinary Rounds  --------------------------------------------------------------  DDRT  Date: 4/21/2022    Present:   Patient seen and examined with multidisciplinary team including Transplant Surgeon: Dr. Tena, Dr. Barber. Transplant Nephrologist: Dr. SURESH Lomeli, Transplant Pharmacist: TAMMY Lim and unit RN during am rounds.  Disciplines not in attendance will be notified of the plan.     HPI: 66M with PMH: DM type II, HTN, CAD s/p CABG in 2020, AFib on Eliquis, CVA in 2019 due to Afib, Seizure d/o following CVA, last episode was on 4/8/22, h/o GIB in 2/2022 EGD with duodenal ulcer.  ESRD due to DM was on HD since 2019.     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  Donor was 58 , KDPI 82%, DCD, single vessels and ureter, HLA mismatch 1, 2, 2. No DSA, cPRA 0%. 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 Re-admitted in May for anemia in the setting of large perinephric hematoma s/p evacuation and repair of arterial anastomosis on 5/2/22. Intra operative biopsy showed no rejection, Creatinine ranging ~2mg/dL.    In Rehab:  He was recently dx with klebsiella UTI with Ertapenem - then switched to Levaquin    He also had parainfluenza pneumonia. Was at rehab progressing well.      Hospital course:  - 6/10: transferred from rehab facility for seizure status epilepticus. Intubated for respiratory protection and transferred to MICU.  EEG showed no seizure activity. Neuro consulted.   - 6/11: Extubated. Found to have R pleural effusion. s/p Thoracentesis 1.3L. Eliquis changed to heparin drip.  Post procedure drop in H&H. 5u PRBC, 2 u FFP.   - 6/11 evening: Transferred to SICU from MICU for further care in setting of hypoxia, significant R pleural effusion, anemia and hemodynamic instability  - 6/11 Intubated at 9pm and sedated on Precedex.  CT placed, 600ml blood drained.  1L total overnight, started pressors  - 6/11 Received Protamine for PTT >100 (was on Heparin drip started for AF), 2 u FFP and 5u PRBC total  - 6/13 s/p VATS 2.2L old blood removed; second chest tube placed  - 6/18-19: chest tubes removed  - 6/21: ?PRES vs. chronic ischemic changes on MRI, off Envarsus, switched to Belatacept 6/23 with good graft function  - 6/25: Tx to SICU for refractory seizures, improved with IV antiepileptic regimen  - 7/10: Downgraded from SICU to floor.   - 7/11: OR-->URETERAL STENT REMOVED  - 7/15: ESBL Kleb UTI (50-90K), completed Ertapenem x 3 Days  - 7/25: Tx to SICU for Sepsis/ elevated LFTS  - 7/27: Shiley placed for HD  - 7/28: ongoing fevers -> started Ertapenem/Fluc  - 7/29: HD restarted + 1U PRBC.  IR bx with 2A rejection, started pulse steroids. LFTs have improved  - 8/4: refractory seizures  - 8/8: s/p derm bx with no acute finding  - 8/8: s/p permacath exchange  - 8/16: One seizure on EEG medications changed added brivaracetam  - 8/22: HD, followed by U/S of Transplant kidney    Present:   Patient seen and examined with multidisciplinary team including Transplant Surgeon: Dr. Davis, Transplant Nephrologist: Dr. Nova, and unit RN during am rounds.  Disciplines not in attendance will be notified of the plan.     Interval Events:   - No acute events overnight.   - Remains anuric on HD. H d-2L      Immunosuppression:   Induction: Simulect                                               Maintenance immunosuppression: Sirolimus 6 mg QD, prednisone 10 , MMF held  Ongoing monitoring for signs of rejection.    Potential Discharge date: pending clinical improvement    Education:  Medications    Plan of care:  See Below      MEDICATIONS  (STANDING):  amLODIPine   Tablet 10 milliGRAM(s) Oral daily  artificial  tears Solution 2 Drop(s) Left EYE every 6 hours  brivaracetam 50 milliGRAM(s) Oral two times a day  cephalexin 500 milliGRAM(s) Oral every 12 hours  chlorhexidine 2% Cloths 1 Application(s) Topical <User Schedule>  doxazosin 4 milliGRAM(s) Oral <User Schedule>  epoetin cortney-epbx (RETACRIT) Injectable 49698 Unit(s) SubCutaneous <User Schedule>  finasteride 5 milliGRAM(s) Oral daily  hydrALAZINE 75 milliGRAM(s) Oral three times a day  insulin glargine Injectable (LANTUS) 7 Unit(s) SubCutaneous at bedtime  insulin lispro (ADMELOG) corrective regimen sliding scale   SubCutaneous three times a day before meals  insulin lispro (ADMELOG) corrective regimen sliding scale   SubCutaneous at bedtime  insulin lispro Injectable (ADMELOG) 9 Unit(s) SubCutaneous three times a day before meals  levothyroxine 50 MICROGram(s) Oral daily  melatonin 6 milliGRAM(s) Oral at bedtime  multivitamin 1 Tablet(s) Oral daily  nystatin    Suspension 544175 Unit(s) Oral four times a day  pantoprazole    Tablet 40 milliGRAM(s) Oral <User Schedule>  phenytoin   Capsule 200 milliGRAM(s) Oral two times a day  polyethylene glycol 3350 17 Gram(s) Oral daily  predniSONE   Tablet 10 milliGRAM(s) Oral daily  senna 2 Tablet(s) Oral at bedtime  sirolimus 6 milliGRAM(s) Oral <User Schedule>  trimethoprim   80 mG/sulfamethoxazole 400 mG 1 Tablet(s) Oral daily  valGANciclovir 450 milliGRAM(s) Oral <User Schedule>    MEDICATIONS  (PRN):  acetaminophen     Tablet .. 650 milliGRAM(s) Oral every 6 hours PRN Mild Pain (1 - 3)  diphenhydrAMINE 25 milliGRAM(s) Oral at bedtime PRN Insomnia      PAST MEDICAL & SURGICAL HISTORY:  Hypertension      Diabetes      Dyslipidemia      CAD (Coronary Artery Disease)  with Stents in 06/2009 and 6/2019, s/p off-pump C3L on 8/13/20      Hypothyroidism      CVA (cerebral vascular accident)  12/13/19 with residual bilateral weakness      Anemia      PEG (percutaneous endoscopic gastrostomy) status  removed July 2020      Intubation of airway performed without difficulty  Dec 2019  CVA c/b status epilepticus requiring intubation and PEG in 12/2019-      ESRD on dialysis  M/W/F      Seizures  after CVA, last seizure was last week 4/07/22      History of insertion of stent into coronary artery bypass graft  2009 and 2019      S/P CABG x 3  off pump C3L on 8/13/20          Vital Signs Last 24 Hrs  T(C): 36.4 (30 Aug 2022 09:37), Max: 37.3 (29 Aug 2022 12:51)  T(F): 97.6 (30 Aug 2022 09:17), Max: 99.1 (29 Aug 2022 12:51)  HR: 78 (30 Aug 2022 09:37) (71 - 80)  BP: 149/67 (30 Aug 2022 09:37) (142/69 - 175/81)  BP(mean): 104 (29 Aug 2022 21:10) (104 - 104)  RR: 20 (30 Aug 2022 09:37) (18 - 20)  SpO2: 95% (30 Aug 2022 09:37) (95% - 99%)    Parameters below as of 30 Aug 2022 09:17  Patient On (Oxygen Delivery Method): room air        I&O's Summary    29 Aug 2022 07:01  -  30 Aug 2022 07:00  --------------------------------------------------------  IN: 2480 mL / OUT: 2880 mL / NET: -400 mL                              8.9    7.24  )-----------( 463      ( 30 Aug 2022 06:17 )             27.6     08-30    131<L>  |  89<L>  |  69<H>  ----------------------------<  93  4.5   |  25  |  3.99<H>    Ca    8.8      30 Aug 2022 06:17  Phos  4.4     08-30  Mg     2.0     08-30    TPro  7.1  /  Alb  3.0<L>  /  TBili  0.3  /  DBili  0.2  /  AST  18  /  ALT  25  /  AlkPhos  424<H>  08-30        Review of systems  Gen: No weight changes, fatigue, fevers/chills, weakness  Skin: No rashes  Head/Eyes/Ears/Mouth: No headache; Normal hearing; Normal vision w/o blurriness; No sinus pain/discomfort, sore throat  Respiratory: No dyspnea, cough, wheezing, hemoptysis  CV: No chest pain, PND, orthopnea  GI: denies abdominal pain/flank pain; denies diarrhea, constipation, nausea, vomiting, melena, hematochezia  : No increased frequency, dysuria, hematuria, nocturia  MSK: No joint pain/swelling; no back pain; no edema  Neuro: No dizziness/lightheadedness, weakness, seizures, numbness, tingling  Heme: No easy bruising or bleeding  Endo: No heat/cold intolerance  Psych: No significant nervousness, anxiety, stress, depression  All other systems were reviewed and are negative, except as noted.      PHYSICAL EXAM:  Constitutional: Well developed / well nourished  Eyes: Anicteric  Respiratory: nonlabored   Cardiovascular: normotensive, regular rate   Gastrointestinal: Soft, non distended, No tenderness at the incision site and Rt flank, incision well healed   Extremities: SCD's in place and working bilaterally  Neurological: awake, alert, answering questions appropriately   Skin: RLQ/R flank erythema/firm to touch  Musculoskeletal: Moving all extremities  Psychiatric: Responsive

## 2022-08-30 NOTE — PROGRESS NOTE ADULT - ASSESSMENT
66 year old male with PMH of  DM type II, HTN, CAD s/p CABG in 2020, Afib on Eliquis, CVA in 2019 due to Afib, Seizure d/o (last episode was on 4/8/22), h/o GI bleed in 2/2022 EGD with duodenal ulcer and ESRD on HD s/p R DDRT from DCD donor on 4/21/22 complicated by DGF requiring HD until 4/29/22, later had good graft function who was readmitted with status epilepticus in setting of UTI/antibiotics and sub-therapeutic valproic acid level.  Transferred to SICU on 7/25 with signs of sepsis. 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 Received pulse steroids (Solumedrol 500 on 7/30 -> 250 on 7/31). Currently HD dependent     66 year old male with PMH of  DM type II, HTN, CAD s/p CABG in 2020, Afib on Eliquis, CVA in 2019 due to Afib, Seizure d/o (last episode was on 4/8/22), h/o GI bleed in 2/2022 EGD with duodenal ulcer and ESRD on HD s/p R DDRT from DCD donor on 4/21/22 complicated by DGF requiring HD until 4/29/22, later had good graft function who was readmitted with status epilepticus in setting of UTI/antibiotics and sub-therapeutic valproic acid level.  Transferred to SICU on 7/25 with signs of sepsis. 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 Received pulse steroids (Solumedrol 500 on 7/30 -> 250 on 7/31). Currently HD dependent .

## 2022-08-30 NOTE — PROGRESS NOTE ADULT - ASSESSMENT
66 y/  M with Type 2 DM> unknown control due to skewed A1C 6.6% secondary to chronic anemia and s/p blood tx. On basal/bolus insulin therapy PTA. DM c/b ESRD s/p DDRT on 3/21, CVA, CAD s/p CABG, Afib on apixaban, seizure activity, HTN, HLD, h/o GI bleed in 2/2022 EGD with duodenal ulcer, discharged to [facility] rehab center after prior hospitalization, now re-admitted from [facility] for seizures c/f status epilepticus in setting of UTI. Endocrine consulted for steroid induced hyperglycemia. Prolonged hospital course w/ ICU stay, previously intubated/extubated, previous seizures. S/p ureteral stent removal 7/12/22. 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 Remains on steroid.  BG tightly controlled with FBG <100s today while NPO so will adjust insulin doses to keep BG goal (100-180mg/dl). No hypoglycemia

## 2022-08-31 NOTE — PROGRESS NOTE ADULT - PROBLEM SELECTOR PLAN 3
Continue with LT4 50 mcg daily   - Please make sure LT4 is given on an empty stomach at least one hour apart from other meds and food to ensure med absorption. DO NOT GIVE WITH VITAMINS/ANTACIDS  - If unable to ensure proper time administration switching to IV dosing in order to ensure med absorption. Synthroid 37.5mcg IV   Monitor TFTs outpatient. TSH can be skewed when critically ill.

## 2022-08-31 NOTE — PROGRESS NOTE ADULT - SUBJECTIVE AND OBJECTIVE BOX
[facility] DIVISION OF KIDNEY DISEASES AND HYPERTENSION -- FOLLOW UP NOTE  --------------------------------------------------------------------------------  HPI: 66y old  Male h/o DM type II, HTN, CAD s/p CABG in 2020, Afib on Eliquis, CVA in 2019 due to Afib, Hypothyroidism, Seizure d/o, h/o GI bleed in 2/2022 EGD with duodenal ulcer and ESRD on HD s/p R DDRT from DCD donor on 4/21/22 complicated by DGF requiring HD until 4/29/22, later had good graft function who was readmitted with status epilepticus in setting of UTI/antibiotics and sub-therapeutic valproic acid level.  Envarsus was discontinued for seizures and poor mental status and he was started on belatacept. Subsequent ANA due to rejection. 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 Received pulse steroids (Solumedrol 500 on 7/30 -> 250 on 7/31). Did not receive thymo due to frailty. Currently HD dependent.     Pt. seen and examined at bedside. Pt. denies any SOB, CP, HA, N/V, abdominal pain, or dizziness. HD done on 8/29/22. Tolerated well. Underwent L IJ Tunneled HD catheter placement on 8/30/22.     PAST HISTORY  --------------------------------------------------------------------------------  No significant changes to PMH, PSH, FHx, SHx, unless otherwise noted    ALLERGIES & MEDICATIONS  --------------------------------------------------------------------------------  Allergies    No Known Allergies    Intolerances    Standing Inpatient Medications  amLODIPine   Tablet 10 milliGRAM(s) Oral daily  artificial  tears Solution 2 Drop(s) Left EYE every 6 hours  brivaracetam 50 milliGRAM(s) Oral two times a day  cephalexin 500 milliGRAM(s) Oral every 12 hours  chlorhexidine 2% Cloths 1 Application(s) Topical <User Schedule>  chlorhexidine 4% Liquid 1 Application(s) Topical <User Schedule>  doxazosin 4 milliGRAM(s) Oral <User Schedule>  epoetin cortney-epbx (RETACRIT) Injectable 83708 Unit(s) SubCutaneous <User Schedule>  finasteride 5 milliGRAM(s) Oral daily  heparin   Injectable 5000 Unit(s) SubCutaneous every 12 hours  hydrALAZINE 75 milliGRAM(s) Oral three times a day  insulin glargine Injectable (LANTUS) 6 Unit(s) SubCutaneous at bedtime  insulin lispro (ADMELOG) corrective regimen sliding scale   SubCutaneous three times a day before meals  insulin lispro (ADMELOG) corrective regimen sliding scale   SubCutaneous at bedtime  insulin lispro Injectable (ADMELOG) 8 Unit(s) SubCutaneous three times a day with meals  levothyroxine 50 MICROGram(s) Oral daily  melatonin 6 milliGRAM(s) Oral at bedtime  multivitamin 1 Tablet(s) Oral daily  nystatin    Suspension 549472 Unit(s) Oral four times a day  pantoprazole    Tablet 40 milliGRAM(s) Oral <User Schedule>  phenytoin   Capsule 200 milliGRAM(s) Oral two times a day  polyethylene glycol 3350 17 Gram(s) Oral daily  predniSONE   Tablet 10 milliGRAM(s) Oral daily  senna 2 Tablet(s) Oral at bedtime  sirolimus 7 milliGRAM(s) Oral <User Schedule>  trimethoprim   80 mG/sulfamethoxazole 400 mG 1 Tablet(s) Oral daily  valGANciclovir 450 milliGRAM(s) Oral <User Schedule>  warfarin 5 milliGRAM(s) Oral at bedtime    PRN Inpatient Medications  acetaminophen     Tablet .. 650 milliGRAM(s) Oral every 6 hours PRN  diphenhydrAMINE 25 milliGRAM(s) Oral at bedtime PRN  ondansetron Injectable 4 milliGRAM(s) IV Push once PRN  sodium chloride 0.9% lock flush 10 milliLiter(s) IV Push every 1 hour PRN    REVIEW OF SYSTEMS  --------------------------------------------------------------------------------  Gen: No fevers/chills  Respiratory: No dyspnea, cough,   CV: No chest pain,   GI: See HPI,  Transplant: No pain  : No increased frequency,  MSK: No edema  Neuro: No dizziness/lightheadedness  All other systems were reviewed and are negative, except as noted.    VITALS/PHYSICAL EXAM  --------------------------------------------------------------------------------  T(C): 36.4 (08-31-22 @ 09:36), Max: 36.7 (08-30-22 @ 20:45)  HR: 77 (08-31-22 @ 09:36) (62 - 78)  BP: 178/76 (08-31-22 @ 09:36) (136/63 - 178/77)  RR: 20 (08-31-22 @ 09:36) (16 - 20)  SpO2: 96% (08-31-22 @ 09:36) (95% - 100%)  Wt(kg): --  Height (cm): 177.8 (08-30-22 @ 09:37)  Weight (kg): 61.7 (08-30-22 @ 09:37)  BMI (kg/m2): 19.5 (08-30-22 @ 09:37)  BSA (m2): 1.77 (08-30-22 @ 09:37)    08-30-22 @ 07:01  -  08-31-22 @ 07:00  --------------------------------------------------------  IN: 240 mL / OUT: 0 mL / NET: 240 mL    Physical Exam:  	Gen: NAD, able to speak in full sentences   	HEENT: PERRL, MMM   	Pulm: CTA B/L, no crackles   	CV: RRR, S1S2+  	Abd: +BS, soft, RLQ with erythema and induration overlying the renal transplant site with extension into the groin.               Transplant: No tenderness, swelling  	: No suprapubic tenderness  	MSK: no edema   	Psych: Normal affect and mood  	Skin: Warm              Access: Left IJ tunneled HD catheter +    LABS/STUDIES  --------------------------------------------------------------------------------              9.2    6.15  >-----------<  470      [08-31-22 @ 06:35]              28.5     130  |  89  |  89  ----------------------------<  187      [08-31-22 @ 06:35]  5.3   |  22  |  5.10        Ca     9.1     [08-31-22 @ 06:35]      Mg     2.1     [08-31-22 @ 06:35]      Phos  6.0     [08-31-22 @ 06:35]    TPro  6.8  /  Alb  2.8  /  TBili  0.4  /  DBili  0.2  /  AST  51  /  ALT  43  /  AlkPhos  595  [08-31-22 @ 06:35]    PT/INR: PT 16.2 , INR 1.39       [08-31-22 @ 09:59]  PTT: 32.9       [08-31-22 @ 09:59]    Creatinine Trend:  SCr 5.10 [08-31 @ 06:35]  SCr 3.99 [08-30 @ 06:17]  SCr 5.65 [08-29 @ 06:30]  SCr 4.65 [08-28 @ 06:27]  SCr 3.59 [08-27 @ 06:40]    Sirolimus (Rapamycin) Level, Serum: 4.6 ng/mL (08-31 @ 06:35)  Sirolimus (Rapamycin) Level, Serum: 3.1 ng/mL (08-30 @ 06:17)  Sirolimus (Rapamycin) Level, Serum: 3.9 ng/mL (08-29 @ 06:34)  Sirolimus (Rapamycin) Level, Serum: 4.6 ng/mL (08-28 @ 06:30)    Urinalysis - [08-16-22 @ 18:49]      Color Yellow / Appearance Slightly Turbid / SG 1.018 / pH 8.5      Gluc 200 mg/dL / Ketone Negative  / Bili Negative / Urobili Negative       Blood Large / Protein >600 / Leuk Est Moderate / Nitrite Positive      RBC 13 / WBC 40 / Hyaline 7 / Gran  / Sq Epi  / Non Sq Epi 4 / Bacteria Moderate    Iron 17, TIBC 171, %sat 10      [05-02-22 @ 13:03]  Ferritin 6336      [07-27-22 @ 13:00]  PTH -- (Ca 9.2)      [02-05-22 @ 10:13]   294  HbA1c 6.0      [02-21-20 @ 08:53]  TSH 4.69      [07-06-22 @ 18:36]  Lipid: chol --, , HDL --, LDL --      [07-27-22 @ 13:00]

## 2022-08-31 NOTE — PROGRESS NOTE ADULT - ASSESSMENT
66M with h/o DM type II, HTN, CAD s/p CABG in 2020, Afib on Eliquis, CVA in 2019 due to Afib, Seizure d/o (last episode was on 4/8/22), h/o GI bleed in 2/2022 EGD with duodenal ulcer and ESRD on HD s/p R DDRT from DCD donor on 4/21/22 complicated by DGF requiring HD until 4/29/22 who presented with status epilepticus in setting of UTI/antibiotics and sub-therapeutic valproic acid level. Transferred to SICU on 7/25 with signs of sepsis, now with refractory seizures. Last reported seizure on 8/16. Mental status continuing to improve.     Seizure Disorder:  - Neuro/Epilepsy Teams following. Avoid Fycompa 2/2 agitation in past  - 1 min seizure with clonic movements noted on EEG 8/16. No further seizures noted.  EEG 8/16-8/18  - Continue additional anti-seizure medication Briviact 50mg BID with additional 50mg post each HD session  - On Phenytoin and Briviact.   -  Appreciate neuro recommendations to wean and discontinue Phenytoin and to continue Briviact.  Given prolonged hospital course and current stable regimen, would prefer to make medication adjustments as outpatient.  Will discuss with Neuro  - prior MRI head 6/27 with less concerns for PRES, likely ischemic changes  - o/n CTH 8/3 with no acute findings  - Follow up with neuro regarding seizure medications recs for discharge      R DCD DDRT 4/21/22 c/b 2A ACR  - 2A ACR: s/p pulse steroids. Continue Prednisone 10mg QD  - HD/UF as per nephrology for fluid overload/anuria  - 8/14 surveillance BCx negative  - Repeated US from 8/25 did not show any renal artery stenosis, but increased resistive index, consistent with parynchyma disease was noted  - S/P removal of ureteral stent on 7/12  - 8/8 Punch biopsy wound Culture: ngtd. Negative for PTLD  - DVT ppx with SQH  - fungitel negative  - LE studies WNL    RLQ Cellulitis:  - Deferring right psoas muscle/fascia biopsy as wound is not worsening  - Continue Keflex    Immunosuppression:   - Belatacept: 6/23-8/1 discontinued  - Sirolimus 6 mg QD   - Pred 10mg QD  - PPX:  Nystatin, bactrim, valcyte (MWF)    DM  - Labile blood glucose  - On Lantus 7U and pre-meal 9U with ISS coverage  - Fingerstick ac and qhs  - Endo recs appreciated    AFib/R IJ DVT   - Eliquis with ~40% less efficacy while on fosphenytoin.    - Lovenox held for ANA and transaminitis   - rate controlled  -  BID; will continue to discuss need for full A/c    HTN:  - Amlodipine/Doxazosin/Hydralazline  - off Coreg due to bradycardia    BPH:  - Continue Doxazosin/Proscar 66M with h/o DM type II, HTN, CAD s/p CABG in 2020, Afib on Eliquis, CVA in 2019 due to Afib, Seizure d/o (last episode was on 4/8/22), h/o GI bleed in 2/2022 EGD with duodenal ulcer and ESRD on HD s/p R DDRT from DCD donor on 4/21/22 complicated by DGF requiring HD until 4/29/22 who presented with status epilepticus in setting of UTI/antibiotics and sub-therapeutic valproic acid level. Transferred to SICU on 7/25 with signs of sepsis, now with refractory seizures. Last reported seizure on 8/16. Mental status continuing to improve.     Seizure Disorder:  - Neuro/Epilepsy Teams following. Avoid Fycompa 2/2 agitation in past  - 1 min seizure with clonic movements noted on EEG 8/16. No further seizures noted.  EEG 8/16-8/18  - Continue additional anti-seizure medication Briviact 50mg BID with additional 50mg post each HD session  - On Phenytoin and Briviact  - Appreciate neuro recommendations to wean and discontinue Phenytoin and to continue Briviact.  Given prolonged hospital course and current stable regimen, would prefer to make medication adjustments as outpatient.  Discussed with Neuro  - prior MRI head 6/27 with less concerns for PRES, likely ischemic changes  - o/n CTH 8/3 with no acute findings  - Follow up with neuro regarding seizure medications recs for discharge      R DCD DDRT 4/21/22 c/b 2A ACR  - 2A ACR: s/p pulse steroids. Continue Prednisone 10mg QD  - HD/UF as per nephrology for fluid overload/anuria  - 8/14 surveillance BCx negative  - Repeated US from 8/25 did not show any renal artery stenosis, but increased resistive index, consistent with parynchyma disease was noted  - S/P removal of ureteral stent on 7/12  - 8/8 Punch biopsy wound Culture: ngtd. Negative for PTLD  - DVT ppx with SQH  - fungitel negative  - LE studies WNL    RLQ Cellulitis:  - Deferring right psoas muscle/fascia biopsy as wound is not worsening  - Continue Keflex    Immunosuppression:   - Belatacept: 6/23-8/1 discontinued  - Sirolimus 7 mg QD   - Pred 10mg QD  - PPX:  Nystatin, bactrim, valcyte (MWF)    DM  - Labile blood glucose  - On Lantus 6U and pre-meal 8U with ISS coverage  - Fingerstick ac and qhs  - Endo recs appreciated    AFib/R IJ DVT   - Eliquis with ~40% less efficacy while on fosphenytoin  - Lovenox held for ANA and transaminitis   - rate controlled  -  BID  - Start Coumadin 5 mg tonight    HTN:  - Amlodipine/Doxazosin/Hydralazline  - off Coreg due to bradycardia    BPH:  - Continue Doxazosin/Proscar

## 2022-08-31 NOTE — PROGRESS NOTE ADULT - PROBLEM SELECTOR PLAN 1
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 No DSA.     s/p pulse dose steroids. Thymo was not given to treat the rejection as he is too frail and at increased risk for infections. Now with failed allograft function, Remains anuric and is dialysis dependent. Pt underwent IR guided HD catheter exchange on 8/8/22. Last HD done on 8/29/22. Tolerated well. Pt. underwent L IJ tunneled HD catheter placement on 8/30/22.      Labs reviewed. Pt. is still anuric. Plan for HD today. Monitor for labs. Dose meds as per HD.

## 2022-08-31 NOTE — PROGRESS NOTE ADULT - PROBLEM SELECTOR PROBLEM 5
Bilateral leg edema
HLD (hyperlipidemia)
Bilateral leg edema

## 2022-08-31 NOTE — PROGRESS NOTE ADULT - PROBLEM SELECTOR PROBLEM 6
Cellulitis

## 2022-08-31 NOTE — PROGRESS NOTE ADULT - PROBLEM SELECTOR PLAN 5
Pt with B/L LE/UE swelling R>L, doppler studies negative of DVT. Continue Subq Heparin. Cannot use Eliquis given interaction with Dilantin. Edema greatly improving with fluid removal. Last HD done on 8/29/22. HD plan as outline above .

## 2022-08-31 NOTE — PROGRESS NOTE ADULT - ASSESSMENT
66 y/  M with Type 2 DM> unknown control due to skewed A1C 6.6% secondary to chronic anemia and s/p blood tx. On basal/bolus insulin therapy PTA. DM c/b ESRD s/p DDRT on 3/21, CVA, CAD s/p CABG, Afib on apixaban, seizure activity, HTN, HLD, h/o GI bleed in 2/2022 EGD with duodenal ulcer, discharged to [facility] rehab center after prior hospitalization, now re-admitted from [facility] for seizures c/f status epilepticus in setting of UTI. Endocrine consulted for steroid induced hyperglycemia. Prolonged hospital course w/ ICU stay, previously intubated/extubated, previous seizures. S/p ureteral stent removal 7/12/22. 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 Remains on steroid. BG values at goal but at risk for low glucose values during HD as pt receiving insulin without eating lunch today. BG goal (100-180mg/dl).

## 2022-08-31 NOTE — PROGRESS NOTE ADULT - NUTRITIONAL ASSESSMENT
Diet, Consistent Carbohydrate w/Evening Snack:   1000mL Fluid Restriction ([id number])  No Concentrated Phosphorus  Supplement Feeding Modality:  Oral  Nepro Cans or Servings Per Day:  3       Frequency:  Daily (08-29-22 @ 16:09) [Active]

## 2022-08-31 NOTE — PROGRESS NOTE ADULT - NUTRITIONAL ASSESSMENT
This patient has been assessed with a concern for Malnutrition and has been determined to have a diagnosis/diagnoses of Severe protein-calorie malnutrition.    This patient is being managed with:   Diet Consistent Carbohydrate w/Evening Snack-  1000mL Fluid Restriction ([id number])  No Concentrated Phosphorus  Supplement Feeding Modality:  Oral  Nepro Cans or Servings Per Day:  3       Frequency:  Daily  Entered: Aug 29 2022  4:09PM

## 2022-08-31 NOTE — PROGRESS NOTE ADULT - ASSESSMENT
66 yr old Male with PMHx ESRD s/p permacath removal 5/10/22 s/p Rt DDRT 4/21/22,  CVA (2019), Afib on apixaban, seizure activity, CAD s/p stents, CABG (2020), HTN, HLD, DM on insulin, gastric/duodenal ulcer, recent hospitalization 4/28/22 -5/10/22 with weakness/anemia found to have perinephric hematoma requiring evacuation and repair of bleeding arterial anastomosis. Curently being treated for UTI with Levaquin Today after developing multiple sz episodes refractory to 5 mg versed IM (EMS) and 2 mg ativan IV in E.D. concerning for status epilepticus requiring intubation for hypoxic respiratory  failure. MICU Consult was called for hypoxic respiratory failure secondary to status epilepticus.  Nephrology consulted for renal failure.     A/P  DCD KTX on 04/21/22  Course complicated by DGF, was on HD until 4/29/22. Readmitted in May for anemia in the setting of large perinephric hematoma s/p evacuation and repair of arterial anastomosis on 5/02/22. Intra operative biopsy showed no rejection.  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   Now with failed allograft function, Remains dialysis dependent.   continue Immunosuppression per transplant team  s/p Perma cath placement 08/30/22   HD today - RRT per transplant team   transplant team on board   monitor BMP, U/O     Immunosuppression per transplant team  - Belatacept: 6/23-8/1/2022 discontinued  - on Sirolimus since 8/1/2022 based on level, Cellcept 250 BID, Prednisone 10 mg QD 8/16  - PPX:  Nystatin, bactrim, valcyte (MWF)      HTN   BP manage by transplant team    monitor BP closely

## 2022-08-31 NOTE — PROGRESS NOTE ADULT - SUBJECTIVE AND OBJECTIVE BOX
[facility]   MD NINA MASON MD, DO SADIE RAMIREZ NP    TEL:  OFFICE: [phone]    From 5pm-7am Answering Service [phone]    -- RENAL FOLLOW UP NOTE ---Date of Service 08-31-22 @ 14:39    Patient is a 66y old  Male who presents with a chief complaint of Status Epilepticus (31 Aug 2022 11:20)      Patient seen and examined at bedside. No chest pain/sob    VITALS:  T(F): 98 (08-31-22 @ 13:10), Max: 98 (08-30-22 @ 20:45)  HR: 79 (08-31-22 @ 13:10)  BP: 151/67 (08-31-22 @ 13:10)  RR: 20 (08-31-22 @ 13:10)  SpO2: 95% (08-31-22 @ 13:10)  Wt(kg): --    08-30 @ 07:01  -  08-31 @ 07:00  --------------------------------------------------------  IN: 240 mL / OUT: 0 mL / NET: 240 mL    08-31 @ 07:01  -  08-31 @ 14:39  --------------------------------------------------------  IN: 240 mL / OUT: 0 mL / NET: 240 mL          PHYSICAL EXAM:  Constitutional: NAD  Neck: No JVD  Respiratory: CTAB, no wheezes, rales or rhonchi  Cardiovascular: S1, S2, RRR  Gastrointestinal: BS+, soft, NT/ND  Extremities: No peripheral edema    Hospital Medications:   MEDICATIONS  (STANDING):  amLODIPine   Tablet 10 milliGRAM(s) Oral daily  artificial  tears Solution 2 Drop(s) Left EYE every 6 hours  brivaracetam 50 milliGRAM(s) Oral two times a day  brivaracetam 50 milliGRAM(s) Oral once  cephalexin 500 milliGRAM(s) Oral every 12 hours  chlorhexidine 2% Cloths 1 Application(s) Topical <User Schedule>  chlorhexidine 4% Liquid 1 Application(s) Topical <User Schedule>  doxazosin 4 milliGRAM(s) Oral <User Schedule>  epoetin cortney-epbx (RETACRIT) Injectable 13821 Unit(s) SubCutaneous <User Schedule>  finasteride 5 milliGRAM(s) Oral daily  heparin   Injectable 5000 Unit(s) SubCutaneous every 12 hours  hydrALAZINE 75 milliGRAM(s) Oral three times a day  insulin glargine Injectable (LANTUS) 6 Unit(s) SubCutaneous at bedtime  insulin lispro (ADMELOG) corrective regimen sliding scale   SubCutaneous three times a day before meals  insulin lispro (ADMELOG) corrective regimen sliding scale   SubCutaneous at bedtime  insulin lispro Injectable (ADMELOG) 8 Unit(s) SubCutaneous three times a day with meals  levothyroxine 50 MICROGram(s) Oral daily  melatonin 6 milliGRAM(s) Oral at bedtime  multivitamin 1 Tablet(s) Oral daily  nystatin    Suspension 802657 Unit(s) Oral four times a day  pantoprazole    Tablet 40 milliGRAM(s) Oral <User Schedule>  phenytoin   Capsule 200 milliGRAM(s) Oral two times a day  polyethylene glycol 3350 17 Gram(s) Oral daily  predniSONE   Tablet 10 milliGRAM(s) Oral daily  senna 2 Tablet(s) Oral at bedtime  sirolimus 7 milliGRAM(s) Oral <User Schedule>  trimethoprim   80 mG/sulfamethoxazole 400 mG 1 Tablet(s) Oral daily  valGANciclovir 450 milliGRAM(s) Oral <User Schedule>  warfarin 5 milliGRAM(s) Oral at bedtime      LABS:  08-31    130<L>  |  89<L>  |  89<H>  ----------------------------<  187<H>  5.3   |  22  |  5.10<H>    Ca    9.1      31 Aug 2022 06:35  Phos  6.0     08-31  Mg     2.1     08-31    TPro  6.8  /  Alb  2.8<L>  /  TBili  0.4  /  DBili  0.2  /  AST  51<H>  /  ALT  43  /  AlkPhos  595<H>  08-31    Creatinine Trend: 5.10 <--, 3.99 <--, 5.65 <--, 4.65 <--, 3.59 <--, 4.83 <--, 3.63 <--    Albumin, Serum: 2.8 g/dL (08-31 @ 06:35)  Phosphorus Level, Serum: 6.0 mg/dL (08-31 @ 06:35)                              9.2    6.15  )-----------( 470      ( 31 Aug 2022 06:35 )             28.5     Urine Studies:  Urinalysis - [08-16-22 @ 18:49]      Color Yellow / Appearance Slightly Turbid / SG 1.018 / pH 8.5      Gluc 200 mg/dL / Ketone Negative  / Bili Negative / Urobili Negative       Blood Large / Protein >600 / Leuk Est Moderate / Nitrite Positive      RBC 13 / WBC 40 / Hyaline 7 / Gran  / Sq Epi  / Non Sq Epi 4 / Bacteria Moderate      Iron 17, TIBC 171, %sat 10      [05-02-22 @ 13:03]  Ferritin 6336      [07-27-22 @ 13:00]  PTH -- (Ca 9.2)      [02-05-22 @ 10:13]   294  HbA1c 6.0      [02-21-20 @ 08:53]  TSH 4.69      [07-06-22 @ 18:36]  Lipid: chol --, , HDL --, LDL --      [07-27-22 @ 13:00]        RADIOLOGY & ADDITIONAL STUDIES:

## 2022-08-31 NOTE — PROGRESS NOTE ADULT - ATTENDING COMMENTS
Pt stable.  Has ANA unlikely to recover now s/p permacath.   Plan for d/c to rehab  On sirolimus dose to 7mgs.  cont pred 10.  Was on belatacept.  cellulitis improved.  Start coumadin for Afib, UE DVT.    No further seizures, would not change anti-seizure meds as he has had many seizures in hospital and is finally stable.

## 2022-08-31 NOTE — PROGRESS NOTE ADULT - PROBLEM SELECTOR PLAN 4
ESBL klebsiella UTI (7/15 + UCx, 7/25 +UCx)- completed a course of  Ertapenem on  8/3. Resolved declines

## 2022-08-31 NOTE — PROGRESS NOTE ADULT - SUBJECTIVE AND OBJECTIVE BOX
Diabetes Follow up note:    Chief complaint: T2DM    Interval Hx: -200s over past 24 hours. Pt seen at bedside during HD. Pt noted to have lunch tray untouched, HD RN verified pt did not eat despite receiving premeal insulin. Pt encouraged to drink Nepro while at bedside.     Review of Systems:  General: no complaints.   GI: Tolerating POs. Denies N/V/D/Abd pain  CV: Denies CP/SOB  ENDO: No S&Sx of hypoglycemia    MEDS:  finasteride 5 milliGRAM(s) Oral daily  insulin glargine Injectable (LANTUS) 6 Unit(s) SubCutaneous at bedtime  insulin lispro (ADMELOG) corrective regimen sliding scale   SubCutaneous three times a day before meals  insulin lispro (ADMELOG) corrective regimen sliding scale   SubCutaneous at bedtime  insulin lispro Injectable (ADMELOG) 8 Unit(s) SubCutaneous three times a day with meals  levothyroxine 50 MICROGram(s) Oral daily  predniSONE   Tablet 10 milliGRAM(s) Oral daily    cephalexin 500 milliGRAM(s) Oral every 12 hours  nystatin    Suspension 134790 Unit(s) Oral four times a day  trimethoprim   80 mG/sulfamethoxazole 400 mG 1 Tablet(s) Oral daily  valGANciclovir 450 milliGRAM(s) Oral <User Schedule>    Allergies    No Known Allergies      PE:  General: Male lying in bed. NAD>   Vital Signs Last 24 Hrs  T(C): 36.7 (31 Aug 2022 13:10), Max: 36.7 (30 Aug 2022 20:45)  T(F): 98 (31 Aug 2022 13:10), Max: 98 (30 Aug 2022 20:45)  HR: 79 (31 Aug 2022 13:10) (62 - 79)  BP: 151/67 (31 Aug 2022 13:10) (136/63 - 178/77)  BP(mean): --  RR: 20 (31 Aug 2022 13:10) (16 - 20)  SpO2: 95% (31 Aug 2022 13:10) (95% - 100%)    Parameters below as of 31 Aug 2022 13:10  Patient On (Oxygen Delivery Method): room air      Abd: Soft, NT,ND,   Extremities: Warm  Neuro: A&O X3    LABS:  POCT Blood Glucose.: 156 mg/dL (08-31-22 @ 11:55)  POCT Blood Glucose.: 165 mg/dL (08-31-22 @ 08:17)  POCT Blood Glucose.: 239 mg/dL (08-30-22 @ 22:10)  POCT Blood Glucose.: 106 mg/dL (08-30-22 @ 17:21)  POCT Blood Glucose.: 120 mg/dL (08-30-22 @ 12:12)  POCT Blood Glucose.: 112 mg/dL (08-30-22 @ 09:16)  POCT Blood Glucose.: 92 mg/dL (08-29-22 @ 21:10)  POCT Blood Glucose.: 111 mg/dL (08-29-22 @ 17:23)  POCT Blood Glucose.: 206 mg/dL (08-29-22 @ 12:11)  POCT Blood Glucose.: 124 mg/dL (08-29-22 @ 08:37)  POCT Blood Glucose.: 125 mg/dL (08-28-22 @ 21:31)  POCT Blood Glucose.: 239 mg/dL (08-28-22 @ 18:08)                            9.2    6.15  )-----------( 470      ( 31 Aug 2022 06:35 )             28.5       08-31    130<L>  |  89<L>  |  89<H>  ----------------------------<  187<H>  5.3   |  22  |  5.10<H>    eGFR: 12<L>    Ca    9.1      08-31  Mg     2.1     08-31  Phos  6.0     08-31    TPro  6.8  /  Alb  2.8<L>  /  TBili  0.4  /  DBili  0.2  /  AST  51<H>  /  ALT  43  /  AlkPhos  595<H>  08-31      A1C with Estimated Average Glucose Result: 6.0 % (06-30-22 @ 05:49)  A1C with Estimated Average Glucose Result: 6.6 % (04-24-22 @ 17:12)  A1C with Estimated Average Glucose Result: 7.3 % (02-04-22 @ 13:42)  A1C with Estimated Average Glucose Result: 7.2 % (02-04-22 @ 05:48)          Contact number: serge [phone] or [phone]

## 2022-08-31 NOTE — PROGRESS NOTE ADULT - PROBLEM SELECTOR PLAN 1
Test BG AC/HS  -c/w Lantus 6 units QHS  -C/w Admelog 8 units ac meals and administer 5 units for BG <120 if pt eats. HOLD IF PT NOT EATING.    -c/w Admelog low correction scale AC and low HS scale  -notify endocrine team if steroids changed as insulin regimen will need adjustment  Discharge planning:   - plan to rehab, likely will need basal bolus insulin final doses tbd  Outpatient f/u with Endocrinologist Dr. Lincoln. [address]. Phone  Needs apt at time of discharge  -Needs optho/renal/cardiac f/u as out pt  -Make sure pt has insulin and DM supplies at time of discharge.

## 2022-08-31 NOTE — PROGRESS NOTE ADULT - SUBJECTIVE AND OBJECTIVE BOX
Transplant Surgery - Multidisciplinary Rounds  --------------------------------------------------------------  DDRT  Date: 4/21/2022    Present:   Patient seen and examined with multidisciplinary team including Transplant Surgeon: Dr. Tena. Transplant Nephrologist: Dr. SURESH Lomeli, Transplant Pharmacist: TAMMY Lim and unit RN during am rounds.  Disciplines not in attendance will be notified of the plan.     HPI: 66M with PMH: DM type II, HTN, CAD s/p CABG in 2020, AFib on Eliquis, CVA in 2019 due to Afib, Seizure d/o following CVA, last episode was on 4/8/22, h/o GIB in 2/2022 EGD with duodenal ulcer.  ESRD due to DM was on HD since 2019.     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  Donor was 58 , KDPI 82%, DCD, single vessels and ureter, HLA mismatch 1, 2, 2. No DSA, cPRA 0%. 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 Re-admitted in May for anemia in the setting of large perinephric hematoma s/p evacuation and repair of arterial anastomosis on 5/2/22. Intra operative biopsy showed no rejection, Creatinine ranging ~2mg/dL.    In Rehab:  He was recently dx with klebsiella UTI with Ertapenem - then switched to Levaquin    He also had parainfluenza pneumonia. Was at rehab progressing well.      Hospital course:  - 6/10: transferred from rehab facility for seizure status epilepticus. Intubated for respiratory protection and transferred to MICU.  EEG showed no seizure activity. Neuro consulted.   - 6/11: Extubated. Found to have R pleural effusion. s/p Thoracentesis 1.3L. Eliquis changed to heparin drip.  Post procedure drop in H&H. 5u PRBC, 2 u FFP.   - 6/11 evening: Transferred to SICU from MICU for further care in setting of hypoxia, significant R pleural effusion, anemia and hemodynamic instability  - 6/11 Intubated at 9pm and sedated on Precedex.  CT placed, 600ml blood drained.  1L total overnight, started pressors  - 6/11 Received Protamine for PTT >100 (was on Heparin drip started for AF), 2 u FFP and 5u PRBC total  - 6/13 s/p VATS 2.2L old blood removed; second chest tube placed  - 6/18-19: chest tubes removed  - 6/21: ?PRES vs. chronic ischemic changes on MRI, off Envarsus, switched to Belatacept 6/23 with good graft function  - 6/25: Tx to SICU for refractory seizures, improved with IV antiepileptic regimen  - 7/10: Downgraded from SICU to floor.   - 7/11: OR-->URETERAL STENT REMOVED  - 7/15: ESBL Kleb UTI (50-90K), completed Ertapenem x 3 Days  - 7/25: Tx to SICU for Sepsis/ elevated LFTS  - 7/27: Shiley placed for HD  - 7/28: ongoing fevers -> started Ertapenem/Fluc  - 7/29: HD restarted + 1U PRBC.  IR bx with 2A rejection, started pulse steroids. LFTs have improved  - 8/4: refractory seizures  - 8/8: s/p derm bx with no acute finding  - 8/8: s/p permacath exchange  - 8/16: One seizure on EEG medications changed added brivaracetam  - 8/22: HD, followed by U/S of Transplant kidney    Present:   Patient seen and examined with multidisciplinary team including Transplant Surgeon: Dr. Davis, Transplant Nephrologist: Dr. Nvoa, and unit RN during am rounds.  Disciplines not in attendance will be notified of the plan.     Interval Events:   - No acute events overnight.   - Remains anuric on HD      Immunosuppression:   Induction: Simulect                                               Maintenance immunosuppression: Sirolimus 6 mg QD, prednisone 10 , MMF held  Ongoing monitoring for signs of rejection.    Potential Discharge date: pending clinical improvement    Education:  Medications    Plan of care:  See Below      MEDICATIONS  (STANDING):  amLODIPine   Tablet 10 milliGRAM(s) Oral daily  artificial  tears Solution 2 Drop(s) Left EYE every 6 hours  brivaracetam 50 milliGRAM(s) Oral two times a day  cephalexin 500 milliGRAM(s) Oral every 12 hours  chlorhexidine 2% Cloths 1 Application(s) Topical <User Schedule>  doxazosin 4 milliGRAM(s) Oral <User Schedule>  epoetin cortney-epbx (RETACRIT) Injectable 16897 Unit(s) SubCutaneous <User Schedule>  finasteride 5 milliGRAM(s) Oral daily  hydrALAZINE 75 milliGRAM(s) Oral three times a day  insulin glargine Injectable (LANTUS) 7 Unit(s) SubCutaneous at bedtime  insulin lispro (ADMELOG) corrective regimen sliding scale   SubCutaneous three times a day before meals  insulin lispro (ADMELOG) corrective regimen sliding scale   SubCutaneous at bedtime  insulin lispro Injectable (ADMELOG) 9 Unit(s) SubCutaneous three times a day before meals  levothyroxine 50 MICROGram(s) Oral daily  melatonin 6 milliGRAM(s) Oral at bedtime  multivitamin 1 Tablet(s) Oral daily  nystatin    Suspension 334147 Unit(s) Oral four times a day  pantoprazole    Tablet 40 milliGRAM(s) Oral <User Schedule>  phenytoin   Capsule 200 milliGRAM(s) Oral two times a day  polyethylene glycol 3350 17 Gram(s) Oral daily  predniSONE   Tablet 10 milliGRAM(s) Oral daily  senna 2 Tablet(s) Oral at bedtime  sirolimus 6 milliGRAM(s) Oral <User Schedule>  trimethoprim   80 mG/sulfamethoxazole 400 mG 1 Tablet(s) Oral daily  valGANciclovir 450 milliGRAM(s) Oral <User Schedule>    MEDICATIONS  (PRN):  acetaminophen     Tablet .. 650 milliGRAM(s) Oral every 6 hours PRN Mild Pain (1 - 3)  diphenhydrAMINE 25 milliGRAM(s) Oral at bedtime PRN Insomnia      PAST MEDICAL & SURGICAL HISTORY:  Hypertension      Diabetes      Dyslipidemia      CAD (Coronary Artery Disease)  with Stents in 06/2009 and 6/2019, s/p off-pump C3L on 8/13/20      Hypothyroidism      CVA (cerebral vascular accident)  12/13/19 with residual bilateral weakness      Anemia      PEG (percutaneous endoscopic gastrostomy) status  removed July 2020      Intubation of airway performed without difficulty  Dec 2019  CVA c/b status epilepticus requiring intubation and PEG in 12/2019-      ESRD on dialysis  M/W/F      Seizures  after CVA, last seizure was last week 4/07/22      History of insertion of stent into coronary artery bypass graft  2009 and 2019      S/P CABG x 3  off pump C3L on 8/13/20          Vital Signs Last 24 Hrs  T(C): 36.4 (30 Aug 2022 09:37), Max: 37.3 (29 Aug 2022 12:51)  T(F): 97.6 (30 Aug 2022 09:17), Max: 99.1 (29 Aug 2022 12:51)  HR: 78 (30 Aug 2022 09:37) (71 - 80)  BP: 149/67 (30 Aug 2022 09:37) (142/69 - 175/81)  BP(mean): 104 (29 Aug 2022 21:10) (104 - 104)  RR: 20 (30 Aug 2022 09:37) (18 - 20)  SpO2: 95% (30 Aug 2022 09:37) (95% - 99%)    Parameters below as of 30 Aug 2022 09:17  Patient On (Oxygen Delivery Method): room air        I&O's Summary    29 Aug 2022 07:01  -  30 Aug 2022 07:00  --------------------------------------------------------  IN: 2480 mL / OUT: 2880 mL / NET: -400 mL                              8.9    7.24  )-----------( 463      ( 30 Aug 2022 06:17 )             27.6     08-30    131<L>  |  89<L>  |  69<H>  ----------------------------<  93  4.5   |  25  |  3.99<H>    Ca    8.8      30 Aug 2022 06:17  Phos  4.4     08-30  Mg     2.0     08-30    TPro  7.1  /  Alb  3.0<L>  /  TBili  0.3  /  DBili  0.2  /  AST  18  /  ALT  25  /  AlkPhos  424<H>  08-30        Review of systems  Gen: No weight changes, fatigue, fevers/chills, weakness  Skin: No rashes  Head/Eyes/Ears/Mouth: No headache; Normal hearing; Normal vision w/o blurriness; No sinus pain/discomfort, sore throat  Respiratory: No dyspnea, cough, wheezing, hemoptysis  CV: No chest pain, PND, orthopnea  GI: denies abdominal pain/flank pain; denies diarrhea, constipation, nausea, vomiting, melena, hematochezia  : No increased frequency, dysuria, hematuria, nocturia  MSK: No joint pain/swelling; no back pain; no edema  Neuro: No dizziness/lightheadedness, weakness, seizures, numbness, tingling  Heme: No easy bruising or bleeding  Endo: No heat/cold intolerance  Psych: No significant nervousness, anxiety, stress, depression  All other systems were reviewed and are negative, except as noted.      PHYSICAL EXAM:  Constitutional: Well developed / well nourished  Eyes: Anicteric  Respiratory: nonlabored   Cardiovascular: normotensive, regular rate   Gastrointestinal: Soft, non distended, No tenderness at the incision site and Rt flank, incision well healed   Extremities: SCD's in place and working bilaterally  Neurological: awake, alert, answering questions appropriately   Skin: RLQ/R flank erythema/firm to touch  Musculoskeletal: Moving all extremities  Psychiatric: Responsive     Transplant Surgery - Multidisciplinary Rounds  --------------------------------------------------------------  DDRT  Date: 4/21/2022    Present:   Patient seen and examined with multidisciplinary team including Transplant Surgeon: Dr. Tena. Transplant Nephrologist: Dr. SURESH Lomeli, Transplant Pharmacist: TAMMY Lim and unit RN during am rounds.  Disciplines not in attendance will be notified of the plan.     HPI: 66M with PMH: DM type II, HTN, CAD s/p CABG in 2020, AFib on Eliquis, CVA in 2019 due to Afib, Seizure d/o following CVA, last episode was on 4/8/22, h/o GIB in 2/2022 EGD with duodenal ulcer.  ESRD due to DM was on HD since 2019.     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  Donor was 58 , KDPI 82%, DCD, single vessels and ureter, HLA mismatch 1, 2, 2. No DSA, cPRA 0%. 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 Re-admitted in May for anemia in the setting of large perinephric hematoma s/p evacuation and repair of arterial anastomosis on 5/2/22. Intra operative biopsy showed no rejection, Creatinine ranging ~2mg/dL.    In Rehab:  He was recently dx with klebsiella UTI with Ertapenem - then switched to Levaquin    He also had parainfluenza pneumonia. Was at rehab progressing well.      Hospital course:  - 6/10: transferred from rehab facility for seizure status epilepticus. Intubated for respiratory protection and transferred to MICU.  EEG showed no seizure activity. Neuro consulted.   - 6/11: Extubated. Found to have R pleural effusion. s/p Thoracentesis 1.3L. Eliquis changed to heparin drip.  Post procedure drop in H&H. 5u PRBC, 2 u FFP.   - 6/11 evening: Transferred to SICU from MICU for further care in setting of hypoxia, significant R pleural effusion, anemia and hemodynamic instability  - 6/11 Intubated at 9pm and sedated on Precedex.  CT placed, 600ml blood drained.  1L total overnight, started pressors  - 6/11 Received Protamine for PTT >100 (was on Heparin drip started for AF), 2 u FFP and 5u PRBC total  - 6/13 s/p VATS 2.2L old blood removed; second chest tube placed  - 6/18-19: chest tubes removed  - 6/21: ?PRES vs. chronic ischemic changes on MRI, off Envarsus, switched to Belatacept 6/23 with good graft function  - 6/25: Tx to SICU for refractory seizures, improved with IV antiepileptic regimen  - 7/10: Downgraded from SICU to floor.   - 7/11: OR-->URETERAL STENT REMOVED  - 7/15: ESBL Kleb UTI (50-90K), completed Ertapenem x 3 Days  - 7/25: Tx to SICU for Sepsis/ elevated LFTS  - 7/27: Shiley placed for HD  - 7/28: ongoing fevers -> started Ertapenem/Fluc  - 7/29: HD restarted + 1U PRBC.  IR bx with 2A rejection, started pulse steroids. LFTs have improved  - 8/4: refractory seizures  - 8/8: s/p derm bx with no acute finding  - 8/8: s/p permacath exchange  - 8/16: One seizure on EEG medications changed added brivaracetam  - 8/22: HD, followed by U/S of Transplant kidney    Present:   Patient seen and examined with multidisciplinary team including Transplant Surgeon: Dr. Davis, Transplant Nephrologist: Dr. Nova, and unit RN during am rounds.  Disciplines not in attendance will be notified of the plan.     Interval Events:   - No acute events overnight.   - Remains anuric on HD  - Permacath placed by IR      Immunosuppression:   Induction: Simulect                                               Maintenance immunosuppression: Sirolimus 7 mg QD, prednisone 10 , MMF held  Ongoing monitoring for signs of rejection.    Potential Discharge date: pending clinical improvement    Education:  Medications    Plan of care:  See Below      MEDICATIONS  (STANDING):  amLODIPine   Tablet 10 milliGRAM(s) Oral daily  artificial  tears Solution 2 Drop(s) Left EYE every 6 hours  brivaracetam 50 milliGRAM(s) Oral two times a day  cephalexin 500 milliGRAM(s) Oral every 12 hours  chlorhexidine 2% Cloths 1 Application(s) Topical <User Schedule>  chlorhexidine 4% Liquid 1 Application(s) Topical <User Schedule>  doxazosin 4 milliGRAM(s) Oral <User Schedule>  epoetin cortney-epbx (RETACRIT) Injectable 79331 Unit(s) SubCutaneous <User Schedule>  finasteride 5 milliGRAM(s) Oral daily  heparin   Injectable 5000 Unit(s) SubCutaneous every 12 hours  hydrALAZINE 75 milliGRAM(s) Oral three times a day  insulin glargine Injectable (LANTUS) 6 Unit(s) SubCutaneous at bedtime  insulin lispro (ADMELOG) corrective regimen sliding scale   SubCutaneous three times a day before meals  insulin lispro (ADMELOG) corrective regimen sliding scale   SubCutaneous at bedtime  insulin lispro Injectable (ADMELOG) 8 Unit(s) SubCutaneous three times a day with meals  levothyroxine 50 MICROGram(s) Oral daily  melatonin 6 milliGRAM(s) Oral at bedtime  multivitamin 1 Tablet(s) Oral daily  nystatin    Suspension 034444 Unit(s) Oral four times a day  pantoprazole    Tablet 40 milliGRAM(s) Oral <User Schedule>  phenytoin   Capsule 200 milliGRAM(s) Oral two times a day  polyethylene glycol 3350 17 Gram(s) Oral daily  predniSONE   Tablet 10 milliGRAM(s) Oral daily  senna 2 Tablet(s) Oral at bedtime  sirolimus 7 milliGRAM(s) Oral <User Schedule>  trimethoprim   80 mG/sulfamethoxazole 400 mG 1 Tablet(s) Oral daily  valGANciclovir 450 milliGRAM(s) Oral <User Schedule>  warfarin 5 milliGRAM(s) Oral at bedtime    MEDICATIONS  (PRN):  acetaminophen     Tablet .. 650 milliGRAM(s) Oral every 6 hours PRN Mild Pain (1 - 3)  diphenhydrAMINE 25 milliGRAM(s) Oral at bedtime PRN Insomnia  ondansetron Injectable 4 milliGRAM(s) IV Push once PRN Nausea and/or Vomiting  sodium chloride 0.9% lock flush 10 milliLiter(s) IV Push every 1 hour PRN Pre/post blood products, medications, blood draw, and to maintain line patency      PAST MEDICAL & SURGICAL HISTORY:  Hypertension      Diabetes      Dyslipidemia      CAD (Coronary Artery Disease)  with Stents in 06/2009 and 6/2019, s/p off-pump C3L on 8/13/20      Hypothyroidism      CVA (cerebral vascular accident)  12/13/19 with residual bilateral weakness      Anemia      PEG (percutaneous endoscopic gastrostomy) status  removed July 2020      Intubation of airway performed without difficulty  Dec 2019  CVA c/b status epilepticus requiring intubation and PEG in 12/2019-      ESRD on dialysis  M/W/F      Seizures  after CVA, last seizure was last week 4/07/22      History of insertion of stent into coronary artery bypass graft  2009 and 2019      S/P CABG x 3  off pump C3L on 8/13/20          Vital Signs Last 24 Hrs  T(C): 36.4 (31 Aug 2022 09:36), Max: 36.7 (30 Aug 2022 20:45)  T(F): 97.6 (31 Aug 2022 09:36), Max: 98 (30 Aug 2022 20:45)  HR: 77 (31 Aug 2022 09:36) (62 - 78)  BP: 178/76 (31 Aug 2022 09:36) (136/63 - 178/77)  BP(mean): --  RR: 20 (31 Aug 2022 09:36) (16 - 20)  SpO2: 96% (31 Aug 2022 09:36) (95% - 100%)    Parameters below as of 31 Aug 2022 09:36  Patient On (Oxygen Delivery Method): room air        I&O's Summary    30 Aug 2022 07:01  -  31 Aug 2022 07:00  --------------------------------------------------------  IN: 240 mL / OUT: 0 mL / NET: 240 mL                              9.2    6.15  )-----------( 470      ( 31 Aug 2022 06:35 )             28.5     08-31    130<L>  |  89<L>  |  89<H>  ----------------------------<  187<H>  5.3   |  22  |  5.10<H>    Ca    9.1      31 Aug 2022 06:35  Phos  6.0     08-31  Mg     2.1     08-31    TPro  6.8  /  Alb  2.8<L>  /  TBili  0.4  /  DBili  0.2  /  AST  51<H>  /  ALT  43  /  AlkPhos  595<H>  08-31          Review of systems  Gen: No weight changes, fatigue, fevers/chills, weakness  Skin: No rashes  Head/Eyes/Ears/Mouth: No headache; Normal hearing; Normal vision w/o blurriness; No sinus pain/discomfort, sore throat  Respiratory: No dyspnea, cough, wheezing, hemoptysis  CV: No chest pain, PND, orthopnea  GI: denies abdominal pain/flank pain; denies diarrhea, constipation, nausea, vomiting, melena, hematochezia  : No increased frequency, dysuria, hematuria, nocturia  MSK: No joint pain/swelling; no back pain; no edema  Neuro: No dizziness/lightheadedness, weakness, seizures, numbness, tingling  Heme: No easy bruising or bleeding  Endo: No heat/cold intolerance  Psych: No significant nervousness, anxiety, stress, depression  All other systems were reviewed and are negative, except as noted.      PHYSICAL EXAM:  Constitutional: Well developed / well nourished  Eyes: Anicteric  Respiratory: nonlabored   Cardiovascular: normotensive, regular rate   Gastrointestinal: Soft, non distended, No tenderness at the incision site and Rt flank, incision well healed   Extremities: SCD's in place and working bilaterally  Neurological: awake, alert, answering questions appropriately   Skin: RLQ/R flank erythema/firm to touch  Musculoskeletal: Moving all extremities  Psychiatric: Responsive

## 2022-08-31 NOTE — PROGRESS NOTE ADULT - PROBLEM SELECTOR PLAN 6
Cellulitis over RLQ - WBC stable. CT A/P on 8/6 revealed a retroperitoneal hematoma. Txp USG 8/6  - Patent renal artery vasculature. Stable appearance of the transplant kidney. Persistent elevated resistive indices. Diffuse abdominal wall edematous changes right lower quadrant without focal collection, cultures from 8/3 negative so far. Skin bx done on 8/8/22 showed no significant findings. ID follow up. Currently on cephalexin PO BID.    If you have any questions, please feel free to contact me  Brennon Lind  Nephrology Fellow  [phone]/ [employer] Teams(Preferred)  (After 5pm or on weekends please page the on-call fellow).
Cellulitis over RLQ - WBC stable. CT A/P on 8/6 revealed a retroperitoneal hematoma. Txp USG 8/6  - Patent renal artery vasculature. Stable appearance of the transplant kidney. Persistent elevated resistive indices. Diffuse abdominal wall edematous changes right lower quadrant without focal collection, cultures from 8/3 negative so far. Skin bx done on 8/8/22 showed no significant findings. ID follow up. Improving.      If you have any questions, please feel free to contact me  Brennon Lind  Nephrology Fellow  [phone]/ Microsoft Teams(Preferred)  (After 5pm or on weekends please page the on-call fellow).
Cellulitis over RLQ - WBC stable. CT A/P on 8/6 revealed a retroperitoneal hematoma. Txp USG 8/6  - Patent renal artery vasculature. Stable appearance of the transplant kidney. Persistent elevated resistive indices. Diffuse abdominal wall edematous changes right lower quadrant without focal collection, cultures from 8/3 negative so far. Skin bx done on 8/8/22 showed no significant findings. ID follow up. Currently on cephalexin PO BID.    If you have any questions, please feel free to contact me  Brennon Lind  Nephrology Fellow  [phone]/ Microsoft Teams(Preferred)  (After 5pm or on weekends please page the on-call fellow).
Cellulitis over RLQ - WBC stable. CT A/P on 8/6 revealed a retroperitoneal hematoma. Txp USG 8/6  - Patent renal artery vasculature. Stable appearance of the transplant kidney. Persistent elevated resistive indices. Diffuse abdominal wall edematous changes right lower quadrant without focal collection, cultures from 8/3 negative so far. Skin bx done on 8/8/22 showed no significant findings. ID follow up. Currently on cephalexin PO BID    If you have any questions, please feel free to contact me  Brennon Lind  Nephrology Fellow  [phone]/ [employer] Teams(Preferred)  (After 5pm or on weekends please page the on-call fellow).
Cellulitis over RLQ - WBC stable. CT A/P on 8/6 revealed a retroperitoneal hematoma. Txp USG 8/6  - Patent renal artery vasculature. Stable appearance of the transplant kidney. Persistent elevated resistive indices. Diffuse abdominal wall edematous changes right lower quadrant without focal collection, cultures from 8/3 negative so far. Skin bx done on 8/8/22 showed no significant findings. ID follow up. Currently on cephalexin PO BID. Send repeat Blood Cx today and repeat transplant kidney USG.     If you have any questions, please feel free to contact me  Brennon Lind  Nephrology Fellow  [phone]/ [employer] Teams(Preferred)  (After 5pm or on weekends please page the on-call fellow).
Cellulitis over RLQ - WBC stable. CT A/P on 8/6 revealed a retroperitoneal hematoma. Txp USG 8/6  - Patent renal artery vasculature. Stable appearance of the transplant kidney. Persistent elevated resistive indices. Diffuse abdominal wall edematous changes right lower quadrant without focal collection, cultures from 8/3  negative so far. Skin bx done on 8/8/22 showed no significant findings. ID follow up.  Improving.      If you have any questions, please feel free to contact me  Brennon Lind  Nephrology Fellow  [phone]/ Microsoft Teams(Preferred)  (After 5pm or on weekends please page the on-call fellow).
Cellulitis over RLQ - WBC stable. CT A/P on 8/6 revealed a retroperitoneal hematoma. Txp USG 8/6  - Patent renal artery vasculature. Stable appearance of the transplant kidney. Persistent elevated resistive indices. Diffuse abdominal wall edematous changes right lower quadrant without focal collection, cultures from 8/3 negative so far. Skin bx done on 8/8/22 showed no significant findings. ID follow up. Currently on cephalexin PO BID    If you have any questions, please feel free to contact me  Brennon Lind  Nephrology Fellow  [phone]/ Microsoft Teams(Preferred)  (After 5pm or on weekends please page the on-call fellow).
Cellulitis over RLQ - WBC stable. CT A/P on 8/6 revealed a retroperitoneal hematoma. Txp USG 8/6  - Patent renal artery vasculature. Stable appearance of the transplant kidney. Persistent elevated resistive indices. Diffuse abdominal wall edematous changes right lower quadrant without focal collection, cultures from 8/3 negative so far. Skin bx done on 8/8/22 showed no significant findings. ID follow up. Currently on cephalexin PO BID    If you have any questions, please feel free to contact me  Brennon Lind  Nephrology Fellow  [phone]/ Microsoft Teams(Preferred)  (After 5pm or on weekends please page the on-call fellow).
Cellulitis over RLQ - WBC stable. CT A/P on 8/6 revealed a retroperitoneal hematoma. Txp USG 8/6  - Patent renal artery vasculature. Stable appearance of the transplant kidney. Persistent elevated resistive indices. Diffuse abdominal wall edematous changes right lower quadrant without focal collection, cultures from 8/3 negative so far. Skin bx done on 8/8/22 showed no significant findings. ID follow up. Currently on cephalexin PO BID.     If you have any questions, please feel free to contact me  Brennon Lind  Nephrology Fellow  [phone]/ Microsoft Teams(Preferred)  (After 5pm or on weekends please page the on-call fellow).
Cellulitis over RLQ - WBC stable. CT A/P on 8/6 revealed a retroperitoneal hematoma. Txp USG 8/6  - Patent renal artery vasculature. Stable appearance of the transplant kidney. Persistent elevated resistive indices. Diffuse abdominal wall edematous changes right lower quadrant without focal collection, cultures from 8/3 negative so far. Skin bx done on 8/8/22 showed no significant findings. ID follow up. Improving    If you have any questions, please feel free to contact me  Brennon Lind  Nephrology Fellow  [phone]/ Microsoft Teams(Preferred)  (After 5pm or on weekends please page the on-call fellow).
Cellulitis over RLQ - WBC stable. CT A/P on 8/6 revealed a retroperitoneal hematoma. Txp USG 8/6  - Patent renal artery vasculature. Stable appearance of the transplant kidney. Persistent elevated resistive indices. Diffuse abdominal wall edematous changes right lower quadrant without focal collection, cultures from 8/3 negative so far. Skin bx done on 8/8/22 showed no significant findings. ID follow up. Currently on cephalexin PO BID. Send repeat Blood Cx today and repeat transplant kidney USG    If you have any questions, please feel free to contact me  Brennon Lind  Nephrology Fellow  [phone]/ Microsoft Teams(Preferred)  (After 5pm or on weekends please page the on-call fellow).
Cellulitis over RLQ - WBC stable. CT A/P on 8/6 revealed a retroperitoneal hematoma. Txp USG 8/6  - Patent renal artery vasculature. Stable appearance of the transplant kidney. Persistent elevated resistive indices. Diffuse abdominal wall edematous changes right lower quadrant without focal collection, cultures from 8/3 negative so far. Skin bx done on 8/8/22 showed no significant findings. ID follow up. Improving.      If you have any questions, please feel free to contact me  Brennon Lind  Nephrology Fellow  [phone]/ Microsoft Teams(Preferred)  (After 5pm or on weekends please page the on-call fellow).

## 2022-08-31 NOTE — PROGRESS NOTE ADULT - SUBJECTIVE AND OBJECTIVE BOX
Interventional Radiology Follow-Up Note.    Patient seen and examined @ bedside.    This is a 66y Male s/p left tunneled HD catheter placement on 22 in Interventional Radiology with Dr. Mendosa.     No complaint offered.      Medication:  amLODIPine   Tablet: ()  cephalexin: ()  doxazosin: ()  heparin   Injectable: ()  heparin   Injectable: ()  hydrALAZINE: ()  nystatin    Suspension: ()  trimethoprim   80 mG/sulfamethoxazole 400 mG: ()  valGANciclovir: ()    Vitals:  T(F): 97.9, Max: 98 (20:45)  HR: 78  BP: 178/77  RR: 20  SpO2: 98%    Physical Exam:  General: Nontoxic, in NAD.  Abdomen: soft, NTND.   Catheter: Left neck and left chest wall dressing c/d/i, soft with no evidence of hematoma. Mild tenderness to palpation.            LABS:  Na: 130 ( @ 06:35), 131 ( @ 06:17), 131 ( @ 06:30)  K: 5.3 ( @ 06:35), 4.5 ( @ 06:17), 5.3 ( @ 06:30)  Cl: 89 ( @ 06:35), 89 ( @ 06:17), 91 ( @ 06:30)  CO2: 22 ( @ 06:35), 25 ( @ 06:17), 22 ( @ 06:30)  BUN: 89 ( @ 06:35), 69 ( @ 06:17), 98 ( @ 06:30)  Cr: 5.10 ( @ 06:35), 3.99 ( @ 06:17), 5.65 ( @ 06:30)  Glu: 187( @ 06:35), 93( @ 06:17), 120( @ 06:30)    Hgb: 9.2 ( @ 06:35), 8.9 ( @ 06:17), 8.5 ( @ 06:26)  Hct: 28.5 ( 06:35), 27.6 ( 06:17), 26.9 (:26)  WBC: 6.15 (:35), 7.24 ( 06:17), 8.14 (:26)  Plt: 470 (:35), 463 (:17), 483 (:)    INR:   PTT: 32.8 22 @ 06:35, 32.5 22 @ 06:17, 32.4 22 @ 06:20          LIVER FUNCTIONS - ( 31 Aug 2022 06:35 )  Alb: 2.8 g/dL / Pro: 6.8 g/dL / ALK PHOS: 595 U/L / ALT: 43 U/L / AST: 51 U/L / GGT: x               Bilirubin Direct, Serum: 0.2 mg/dL (22 @ 06:35)    Bilirubin Total, Serum: 0.4 mg/dL (22 @ 06:35)    Aspartate Aminotransferase (AST/SGOT): 51 U/L (22 @ 06:35)  Alanine Aminotransferase (ALT/SGPT): 43 U/L (22 @ 06:35)  Aspartate Aminotransferase (AST/SGOT): 18 U/L (22 @ 06:17)  Alanine Aminotransferase (ALT/SGPT): 25 U/L (22 @ 06:17)      Bilirubin Direct, Serum: 0.2 mg/dL (22 @ 06:35)  Bilirubin Total, Serum: 0.4 mg/dL (22 @ 06:35)  Bilirubin Direct, Serum: 0.2 mg/dL (22 @ 06:17)  Bilirubin Total, Serum: 0.3 mg/dL (22 @ 06:17)  Bilirubin Direct, Serum: 0.2 mg/dL (22 @ 06:30)  Bilirubin Total, Serum: 0.3 mg/dL (22 @ 06:30)          Assessment/Plan:  66y M w/ ESRD s/p  donor renal transplant 22 c/b rejection. 14 F Left external jugular non-tunneled cath placed by IR on  (Right IJ/EJ and Left IJ were thrombosed) and last exchanged on .  Pt most recently s/p left tunneled HD catheter placement on 22 in Interventional Radiology.    - OK to use catheter  - Change catheter dressing q3 days or when dressing is saturated.  - Trend vs/labs  - Continue global management per primary team  - IR will sign off     Please call IR at [phone] with any questions, concerns, or issues regarding above.    Also available on [employer] TEAMS.

## 2022-08-31 NOTE — PROGRESS NOTE ADULT - PROBLEM SELECTOR PLAN 2
IS meds- was on Belatacept (stared after rejection). Now on Sirolimus (gaol level 4-6) and steroid .

## 2022-09-01 NOTE — PROGRESS NOTE ADULT - PROBLEM SELECTOR PLAN 1
Pt. 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 No DSA.     s/p pulse dose steroids. Thymo was not given to treat the rejection as he is too frail and at increased risk for infections. Now with failed allograft function, Remains anuric and is dialysis dependent. Pt underwent IR guided HD catheter exchange on 8/8/22. Last HD done on 8/31/22. Tolerated well. Pt. underwent L IJ tunneled HD catheter placement on 8/30/22.      Labs reviewed. Pt. is still anuric. Plan for HD tomorrow. Monitor for labs. Dose meds as per HD.

## 2022-09-01 NOTE — PROGRESS NOTE ADULT - ASSESSMENT
66 yr old Male with PMHx ESRD s/p permacath removal 5/10/22 s/p Rt DDRT 4/21/22,  CVA (2019), Afib on apixaban, seizure activity, CAD s/p stents, CABG (2020), HTN, HLD, DM on insulin, gastric/duodenal ulcer, recent hospitalization 4/28/22 -5/10/22 with weakness/anemia found to have perinephric hematoma requiring evacuation and repair of bleeding arterial anastomosis. Curently being treated for UTI with Levaquin Today after developing multiple sz episodes refractory to 5 mg versed IM (EMS) and 2 mg ativan IV in E.D. concerning for status epilepticus requiring intubation for hypoxic respiratory  failure. MICU Consult was called for hypoxic respiratory failure secondary to status epilepticus.  Nephrology consulted for renal failure.     A/P  DCD KTX on 04/21/22  Course complicated by DGF, was on HD until 4/29/22. Readmitted in May for anemia in the setting of large perinephric hematoma s/p evacuation and repair of arterial anastomosis on 5/02/22. Intra operative biopsy showed no rejection.  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   Now with failed allograft function, Remains dialysis dependent.   continue Immunosuppression per transplant team  s/p Perma cath placement 08/30/22   last HD08/31/22, plan for HD 09/02/22 - RRT per transplant team   transplant team on board   monitor BMP, U/O     Immunosuppression per transplant team  - Belatacept: 6/23-8/1/2022 discontinued  - on Sirolimus since 8/1/2022 based on level,  Prednisone 10 mg QD 8/16  Cellcept 250 BID on hold   - PPX:  Nystatin, bactrim, valcyte (MWF)      HTN   BP manage by transplant team    monitor BP closely

## 2022-09-01 NOTE — PROGRESS NOTE ADULT - PROBLEM SELECTOR PLAN 3
Continue with LT4 50 mcg daily   - Please make sure LT4 is given on an empty stomach at least one hour apart from other meds and food to ensure med absorption. DO NOT GIVE WITH VITAMINS/ANTACIDS  - If unable to ensure proper time administration switching to IV dosing in order to ensure med absorption. Synthroid 37.5mcg IV   Monitor TFTs outpatient. TSH can be skewed when critically ill.    discussed w/pt's son  Can be reached via TEAMS/pager: [phone]   office:  [phone] (M-F 9a-5pm)               [phone] (nights/weekends)   [email or web address] password [email or web address]

## 2022-09-01 NOTE — PROGRESS NOTE ADULT - PROBLEM SELECTOR PLAN 2
IS meds- was on Belatacept (stared after rejection). Now on Sirolimus 7mg daily (goal level 4-6) and prednisone 10mg daily

## 2022-09-01 NOTE — PROGRESS NOTE ADULT - SUBJECTIVE AND OBJECTIVE BOX
[facility] DIVISION OF KIDNEY DISEASES AND HYPERTENSION -- FOLLOW UP NOTE  --------------------------------------------------------------------------------    HPI: 66y old  Male h/o DM type II, HTN, CAD s/p CABG in 2020, Afib on Eliquis, CVA in 2019 due to Afib, Hypothyroidism, Seizure d/o, h/o GI bleed in 2/2022 EGD with duodenal ulcer and ESRD on HD s/p R DDRT from DCD donor on 4/21/22 complicated by DGF requiring HD until 4/29/22, later had good graft function who was readmitted with status epilepticus in setting of UTI/antibiotics and sub-therapeutic valproic acid level.  Envarsus was discontinued for seizures and poor mental status and he was started on belatacept. Subsequent ANA due to rejection. 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 Received pulse steroids (Solumedrol 500 on 7/30 -> 250 on 7/31). Did not receive thymo due to frailty. Currently HD dependent.     Pt. seen and examined at bedside. Pt. denies any chest pain. shortness of breath, nausea, vomiting, or abdominal pain. Last HD on 8/31/22. Tolerated well. Underwent L IJ Tunneled HD catheter placement on 8/30/22.       Standing Inpatient Medications  amLODIPine   Tablet 10 milliGRAM(s) Oral daily  artificial  tears Solution 2 Drop(s) Left EYE every 6 hours  brivaracetam 50 milliGRAM(s) Oral two times a day  cephalexin 500 milliGRAM(s) Oral every 12 hours  chlorhexidine 2% Cloths 1 Application(s) Topical <User Schedule>  chlorhexidine 4% Liquid 1 Application(s) Topical <User Schedule>  doxazosin 4 milliGRAM(s) Oral <User Schedule>  finasteride 5 milliGRAM(s) Oral daily  heparin   Injectable 5000 Unit(s) SubCutaneous every 12 hours  hydrALAZINE 100 milliGRAM(s) Oral three times a day  insulin glargine Injectable (LANTUS) 6 Unit(s) SubCutaneous at bedtime  insulin lispro (ADMELOG) corrective regimen sliding scale   SubCutaneous three times a day before meals  insulin lispro (ADMELOG) corrective regimen sliding scale   SubCutaneous at bedtime  insulin lispro Injectable (ADMELOG) 8 Unit(s) SubCutaneous three times a day with meals  levothyroxine 50 MICROGram(s) Oral daily  melatonin 6 milliGRAM(s) Oral at bedtime  multivitamin 1 Tablet(s) Oral daily  nystatin    Suspension 199808 Unit(s) Oral four times a day  pantoprazole    Tablet 40 milliGRAM(s) Oral <User Schedule>  phenytoin   Capsule 200 milliGRAM(s) Oral two times a day  polyethylene glycol 3350 17 Gram(s) Oral daily  predniSONE   Tablet 10 milliGRAM(s) Oral daily  senna 2 Tablet(s) Oral at bedtime  sirolimus 7 milliGRAM(s) Oral <User Schedule>  trimethoprim   80 mG/sulfamethoxazole 400 mG 1 Tablet(s) Oral daily  valGANciclovir 450 milliGRAM(s) Oral <User Schedule>  warfarin 5 milliGRAM(s) Oral at bedtime    PRN Inpatient Medications  acetaminophen     Tablet .. 650 milliGRAM(s) Oral every 6 hours PRN  diphenhydrAMINE 25 milliGRAM(s) Oral at bedtime PRN  ondansetron Injectable 4 milliGRAM(s) IV Push once PRN  sodium chloride 0.9% lock flush 10 milliLiter(s) IV Push every 1 hour PRN    ROS:   All negative except as mentioned above.         VITALS/PHYSICAL EXAM  --------------------------------------------------------------------------------  T(C): 36.8 (09-01-22 @ 09:14), Max: 36.9 (08-31-22 @ 21:40)  HR: 73 (09-01-22 @ 09:14) (71 - 82)  BP: 136/67 (09-01-22 @ 09:14) (136/67 - 171/78)  RR: 20 (09-01-22 @ 09:14) (18 - 20)  SpO2: 96% (09-01-22 @ 09:14) (95% - 100%)  Wt(kg): --        08-31-22 @ 07:01  -  09-01-22 @ 07:00  --------------------------------------------------------  IN: 1760 mL / OUT: 2800 mL / NET: -1040 mL      Physical Exam:  	Gen: NAD   	HEENT: PERRL, MMM   	Pulm: CTA B/L, no crackles   	CV: RRR, S1S2+  	Abd: +BS, soft, RLQ with erythema overlying the renal transplant site with extension into the groin, no tenderness               Transplant: No tenderness, swelling  	: No suprapubic tenderness  	MSK: no edema   	Psych: Normal affect and mood  	Skin: Warm              Access: Left IJ tunneled HD catheter +    LABS/STUDIES  --------------------------------------------------------------------------------              8.9    6.96  >-----------<  449      [09-01-22 @ 06:54]              27.7     133  |  93  |  56  ----------------------------<  77      [09-01-22 @ 06:54]  4.2   |  23  |  3.84        Ca     9.0     [09-01-22 @ 06:54]      Mg     1.9     [09-01-22 @ 06:54]      Phos  4.7     [09-01-22 @ 06:54]    TPro  7.0  /  Alb  3.0  /  TBili  0.3  /  DBili  0.2  /  AST  23  /  ALT  30  /  AlkPhos  531  [09-01-22 @ 06:54]    PT/INR: PT 16.2 , INR 1.39       [08-31-22 @ 09:59]  PTT: 32.1       [09-01-22 @ 06:55]      Creatinine Trend:  SCr 3.84 [09-01 @ 06:54]  SCr 5.10 [08-31 @ 06:35]  SCr 3.99 [08-30 @ 06:17]  SCr 5.65 [08-29 @ 06:30]  SCr 4.65 [08-28 @ 06:27]        Sirolimus (Rapamycin) Level, Serum: 4.6 ng/mL (08-31 @ 06:35)  Sirolimus (Rapamycin) Level, Serum: 3.1 ng/mL (08-30 @ 06:17)  Sirolimus (Rapamycin) Level, Serum: 3.9 ng/mL (08-29 @ 06:34)  Sirolimus (Rapamycin) Level, Serum: 4.6 ng/mL (08-28 @ 06:30)          CAPILLARY BLOOD GLUCOSE      POCT Blood Glucose.: 103 mg/dL (01 Sep 2022 08:29)  POCT Blood Glucose.: 121 mg/dL (31 Aug 2022 21:34)  POCT Blood Glucose.: 140 mg/dL (31 Aug 2022 17:51)  POCT Blood Glucose.: 156 mg/dL (31 Aug 2022 11:55)      Urinalysis - [08-16-22 @ 18:49]      Color Yellow / Appearance Slightly Turbid / SG 1.018 / pH 8.5      Gluc 200 mg/dL / Ketone Negative  / Bili Negative / Urobili Negative       Blood Large / Protein >600 / Leuk Est Moderate / Nitrite Positive      RBC 13 / WBC 40 / Hyaline 7 / Gran  / Sq Epi  / Non Sq Epi 4 / Bacteria Moderate      Iron 17, TIBC 171, %sat 10      [05-02-22 @ 13:03]  Ferritin 6336      [07-27-22 @ 13:00]  PTH -- (Ca 9.2)      [02-05-22 @ 10:13]   294  HbA1c 6.0      [02-21-20 @ 08:53]  TSH 4.69      [07-06-22 @ 18:36]  Lipid: chol --, , HDL --, LDL --      [07-27-22 @ 13:00]

## 2022-09-01 NOTE — PROVIDER CONTACT NOTE (OTHER) - REASON
Bladder scan showing 547mL
Pt. /69
Pt. /63
Pt. /74
patient hypertensive
patient hypertensive
patient telemetry reading abnormal
Carley borrego to the mid 40s and sustaining
patient hypertensive
increasing bilateral upper extremity swelling
patient hypertensive
Discontinuation of Droplet isolation
Pt. /77
patient NPO but due for medications via NG tube
patient remains hypertensive
pt had large episode of emesis
BMP glucose 56, fingerstick glucose 50 & 53
Increased erythema and induration overlapping transplant site
Larger reddened area in RLQ
Pt tachypneic with increased work of breathing and expiratory wheezing
Pt. 
Pt. /79
Pt. /82
Pt. disoriented and decreased ability to follow commands
Pt. hypertensive
Pt. urine cloudy
patient hypertensive
son stated he is not concerned for DVT as IV seems to be infiltrated
Patient change in Mental status and unable to move Right arm
Pt. /74
does patient require once a shift bladder scan?
persistent hypertension

## 2022-09-01 NOTE — PROGRESS NOTE ADULT - SUBJECTIVE AND OBJECTIVE BOX
[facility] [place] [facility]   MD NINA MASON MD, DO SADIE RAMIREZ NP    TEL:  OFFICE: [phone]    From 5pm-7am Answering Service [phone]    -- RENAL FOLLOW UP NOTE ---Date of Service 09-01-22 @ 13:11    Patient is a 66y old  Male who presents with a chief complaint of Status Epilepticus (01 Sep 2022 09:53)      Patient seen and examined at bedside. No chest pain/sob    VITALS:  T(F): 98.3 (09-01-22 @ 09:14), Max: 98.4 (08-31-22 @ 21:40)  HR: 73 (09-01-22 @ 09:14)  BP: 136/67 (09-01-22 @ 09:14)  RR: 20 (09-01-22 @ 09:14)  SpO2: 96% (09-01-22 @ 09:14)  Wt(kg): --    08-31 @ 07:01  -  09-01 @ 07:00  --------------------------------------------------------  IN: 1760 mL / OUT: 2800 mL / NET: -1040 mL    09-01 @ 07:01  -  09-01 @ 13:11  --------------------------------------------------------  IN: 360 mL / OUT: 0 mL / NET: 360 mL          PHYSICAL EXAM:  Constitutional: NAD  Neck: No JVD  Respiratory: CTAB, no wheezes, rales or rhonchi  Cardiovascular: S1, S2, RRR  Gastrointestinal: BS+, soft, NT/ND  Extremities: No peripheral edema    Hospital Medications:   MEDICATIONS  (STANDING):  amLODIPine   Tablet 10 milliGRAM(s) Oral daily  artificial  tears Solution 2 Drop(s) Left EYE every 6 hours  brivaracetam 50 milliGRAM(s) Oral two times a day  cephalexin 500 milliGRAM(s) Oral every 12 hours  chlorhexidine 2% Cloths 1 Application(s) Topical <User Schedule>  chlorhexidine 4% Liquid 1 Application(s) Topical <User Schedule>  doxazosin 4 milliGRAM(s) Oral <User Schedule>  epoetin cortney-epbx (RETACRIT) Injectable 99133 Unit(s) SubCutaneous <User Schedule>  finasteride 5 milliGRAM(s) Oral daily  heparin   Injectable 5000 Unit(s) SubCutaneous every 12 hours  hydrALAZINE 100 milliGRAM(s) Oral three times a day  insulin glargine Injectable (LANTUS) 6 Unit(s) SubCutaneous at bedtime  insulin lispro (ADMELOG) corrective regimen sliding scale   SubCutaneous three times a day before meals  insulin lispro (ADMELOG) corrective regimen sliding scale   SubCutaneous at bedtime  insulin lispro Injectable (ADMELOG) 8 Unit(s) SubCutaneous three times a day with meals  levothyroxine 50 MICROGram(s) Oral daily  melatonin 6 milliGRAM(s) Oral at bedtime  multivitamin 1 Tablet(s) Oral daily  nystatin    Suspension 298912 Unit(s) Oral four times a day  pantoprazole    Tablet 40 milliGRAM(s) Oral <User Schedule>  phenytoin   Capsule 200 milliGRAM(s) Oral two times a day  polyethylene glycol 3350 17 Gram(s) Oral daily  predniSONE   Tablet 10 milliGRAM(s) Oral daily  senna 2 Tablet(s) Oral at bedtime  sirolimus 7 milliGRAM(s) Oral <User Schedule>  trimethoprim   80 mG/sulfamethoxazole 400 mG 1 Tablet(s) Oral daily  valGANciclovir 450 milliGRAM(s) Oral <User Schedule>  warfarin 5 milliGRAM(s) Oral at bedtime      LABS:  09-01    133<L>  |  93<L>  |  56<H>  ----------------------------<  77  4.2   |  23  |  3.84<H>    Ca    9.0      01 Sep 2022 06:54  Phos  4.7     09-01  Mg     1.9     09-01    TPro  7.0  /  Alb  3.0<L>  /  TBili  0.3  /  DBili  0.2  /  AST  23  /  ALT  30  /  AlkPhos  531<H>  09-01    Creatinine Trend: 3.84 <--, 5.10 <--, 3.99 <--, 5.65 <--, 4.65 <--, 3.59 <--, 4.83 <--    Albumin, Serum: 3.0 g/dL (09-01 @ 06:54)  Phosphorus Level, Serum: 4.7 mg/dL (09-01 @ 06:54)                              8.9    6.96  )-----------( 449      ( 01 Sep 2022 06:54 )             27.7     Urine Studies:  Urinalysis - [08-16-22 @ 18:49]      Color Yellow / Appearance Slightly Turbid / SG 1.018 / pH 8.5      Gluc 200 mg/dL / Ketone Negative  / Bili Negative / Urobili Negative       Blood Large / Protein >600 / Leuk Est Moderate / Nitrite Positive      RBC 13 / WBC 40 / Hyaline 7 / Gran  / Sq Epi  / Non Sq Epi 4 / Bacteria Moderate      Iron 17, TIBC 171, %sat 10      [05-02-22 @ 13:03]  Ferritin 6336      [07-27-22 @ 13:00]  PTH -- (Ca 9.2)      [02-05-22 @ 10:13]   294  HbA1c 6.0      [02-21-20 @ 08:53]  TSH 4.69      [07-06-22 @ 18:36]  Lipid: chol --, , HDL --, LDL --      [07-27-22 @ 13:00]        RADIOLOGY & ADDITIONAL STUDIES:

## 2022-09-01 NOTE — PROVIDER CONTACT NOTE (OTHER) - SITUATION
PT has increasing bilateral upper extremity swelling
Pt. 
Pt. /82
pt having persistent hypertension SBP >170 despite multiple doses of hydralazine
Pt. /74
Pt. /77
Patient completed 8 day Quarantine  Covid negative on day 8
Patient has a new change in mental status with garbled speech and unable to move Right arm
Pt. /69
RN performing q12 bladder scan with result of 547mL.
patient telemetry reading abnormal
Pt. /63
Pt. /74
patient hypertensive
patient remains hypertensive
Pt. /69.
Pt. /79
Pt. urine cloudy
pt was being positioned and vomitted tube feeds
BMP glucose 56-fingerstick glucose 50 & 53
Craley borrego to the mid 40s-low 50s and sustaining
Increased RLQ induration, erythema, tenderness to palpation, and rigidity overlapping renal transplant site.
Pt tachypneic with increased work of breathing and expiratory wheezing
Pt. disoriented and not able to answer , name, current date questions. Pt. not able to follow commands of "squeeze hand".
Redness extending past originally marked redness
does patient require once a shift bladder scan?
patient NPO but due for medications via NG tube
patient hypertensive
patient hypertensive and left arm swollen and hot
son stated he is not concerned for DVT as IV seems to be infiltrated

## 2022-09-01 NOTE — PROGRESS NOTE ADULT - ATTENDING COMMENTS
Pt stable.  Has ANA unlikely to recover now s/p permacath.   Plan for d/c to rehab  On sirolimus dose to 7mgs.  cont pred 10.  Was on belatacept.  cellulitis improved.  Started coumadin for Afib, UE DVT.    No further seizures, would not change anti-seizure meds as he has had many seizures in hospital and is finally stable.  Plan for d/c when INR 2-3.

## 2022-09-01 NOTE — PROGRESS NOTE ADULT - NSPROGADDITIONALINFOA_GEN_ALL_CORE
25 minutes spent on the development of plan of care/coordination of care/glycemic control through review of labs, blood glucose values and vital signs. \

## 2022-09-01 NOTE — PROGRESS NOTE ADULT - SUBJECTIVE AND OBJECTIVE BOX
Diabetes Follow up note:    Chief complaint: T2DM w/steroid induced hyperglycemia    Interval Hx:    Review of Systems:  General:  GI: Tolerating POs. Denies N/V/D/Abd pain  CV: Denies CP/SOB  ENDO: No S&Sx of hypoglycemia    MEDS:  finasteride 5 milliGRAM(s) Oral daily  insulin glargine Injectable (LANTUS) 6 Unit(s) SubCutaneous at bedtime  insulin lispro (ADMELOG) corrective regimen sliding scale   SubCutaneous three times a day before meals  insulin lispro (ADMELOG) corrective regimen sliding scale   SubCutaneous at bedtime  insulin lispro Injectable (ADMELOG) 8 Unit(s) SubCutaneous three times a day with meals  levothyroxine 50 MICROGram(s) Oral daily  predniSONE   Tablet 10 milliGRAM(s) Oral daily    cephalexin 500 milliGRAM(s) Oral every 12 hours  nystatin    Suspension 648097 Unit(s) Oral four times a day  trimethoprim   80 mG/sulfamethoxazole 400 mG 1 Tablet(s) Oral daily  valGANciclovir 450 milliGRAM(s) Oral <User Schedule>    Allergies    No Known Allergies        PE:  General:  Vital Signs Last 24 Hrs  T(C): 36.8 (01 Sep 2022 09:14), Max: 36.9 (31 Aug 2022 21:40)  T(F): 98.3 (01 Sep 2022 09:14), Max: 98.4 (31 Aug 2022 21:40)  HR: 73 (01 Sep 2022 09:14) (73 - 82)  BP: 136/67 (01 Sep 2022 09:14) (136/67 - 171/78)  BP(mean): --  RR: 20 (01 Sep 2022 09:14) (18 - 20)  SpO2: 96% (01 Sep 2022 09:14) (96% - 100%)    Parameters below as of 01 Sep 2022 09:14  Patient On (Oxygen Delivery Method): room air      Abd: Soft, NT,ND,   Extremities: Warm  Neuro: A&O X3    LABS:  POCT Blood Glucose.: 276 mg/dL (09-01-22 @ 12:31)  POCT Blood Glucose.: 103 mg/dL (09-01-22 @ 08:29)  POCT Blood Glucose.: 121 mg/dL (08-31-22 @ 21:34)  POCT Blood Glucose.: 140 mg/dL (08-31-22 @ 17:51)  POCT Blood Glucose.: 156 mg/dL (08-31-22 @ 11:55)  POCT Blood Glucose.: 165 mg/dL (08-31-22 @ 08:17)  POCT Blood Glucose.: 239 mg/dL (08-30-22 @ 22:10)  POCT Blood Glucose.: 106 mg/dL (08-30-22 @ 17:21)  POCT Blood Glucose.: 120 mg/dL (08-30-22 @ 12:12)  POCT Blood Glucose.: 112 mg/dL (08-30-22 @ 09:16)  POCT Blood Glucose.: 92 mg/dL (08-29-22 @ 21:10)  POCT Blood Glucose.: 111 mg/dL (08-29-22 @ 17:23)                            8.9    6.96  )-----------( 449      ( 01 Sep 2022 06:54 )             27.7       09-01    133<L>  |  93<L>  |  56<H>  ----------------------------<  77  4.2   |  23  |  3.84<H>    eGFR: 16<L>    Ca    9.0      09-01  Mg     1.9     09-01  Phos  4.7     09-01    TPro  7.0  /  Alb  3.0<L>  /  TBili  0.3  /  DBili  0.2  /  AST  23  /  ALT  30  /  AlkPhos  531<H>  09-01      Thyroid Function Tests:      A1C with Estimated Average Glucose Result: 6.0 % (06-30-22 @ 05:49)  A1C with Estimated Average Glucose Result: 6.6 % (04-24-22 @ 17:12)  A1C with Estimated Average Glucose Result: 7.3 % (02-04-22 @ 13:42)  A1C with Estimated Average Glucose Result: 7.2 % (02-04-22 @ 05:48)          Contact number: serge [phone] or [phone]       Diabetes Follow up note:    Chief complaint: T2DM w/steroid induced hyperglycemia    Interval Hx: Serum glucose in 70s, pre-lunch 270s today. Pt seen at bedside w/son present. Pt worked w/PT prior to visit so sleepy at time of visit. Son endorses pt eating most of his meals today. Will be going to acute rehab upon discharge.     Review of Systems:  General: no complaints.   GI: Tolerating POs. Denies N/V/D/Abd pain  CV: Denies CP/SOB  ENDO: No S&Sx of hypoglycemia    MEDS:  finasteride 5 milliGRAM(s) Oral daily  insulin glargine Injectable (LANTUS) 6 Unit(s) SubCutaneous at bedtime  insulin lispro (ADMELOG) corrective regimen sliding scale   SubCutaneous three times a day before meals  insulin lispro (ADMELOG) corrective regimen sliding scale   SubCutaneous at bedtime  insulin lispro Injectable (ADMELOG) 8 Unit(s) SubCutaneous three times a day with meals  levothyroxine 50 MICROGram(s) Oral daily  predniSONE   Tablet 10 milliGRAM(s) Oral daily    cephalexin 500 milliGRAM(s) Oral every 12 hours  nystatin    Suspension 751219 Unit(s) Oral four times a day  trimethoprim   80 mG/sulfamethoxazole 400 mG 1 Tablet(s) Oral daily  valGANciclovir 450 milliGRAM(s) Oral <User Schedule>    Allergies    No Known Allergies        PE:  General: Male sitting in chair. NAD.   Vital Signs Last 24 Hrs  T(C): 36.8 (01 Sep 2022 09:14), Max: 36.9 (31 Aug 2022 21:40)  T(F): 98.3 (01 Sep 2022 09:14), Max: 98.4 (31 Aug 2022 21:40)  HR: 73 (01 Sep 2022 09:14) (73 - 82)  BP: 136/67 (01 Sep 2022 09:14) (136/67 - 171/78)  BP(mean): --  RR: 20 (01 Sep 2022 09:14) (18 - 20)  SpO2: 96% (01 Sep 2022 09:14) (96% - 100%)    Parameters below as of 01 Sep 2022 09:14  Patient On (Oxygen Delivery Method): room air      Abd: Soft, NT,ND,   Extremities: Warm  Neuro: Sleepy at time of visit.     LABS:  POCT Blood Glucose.: 276 mg/dL (09-01-22 @ 12:31)  POCT Blood Glucose.: 103 mg/dL (09-01-22 @ 08:29)  POCT Blood Glucose.: 121 mg/dL (08-31-22 @ 21:34)  POCT Blood Glucose.: 140 mg/dL (08-31-22 @ 17:51)  POCT Blood Glucose.: 156 mg/dL (08-31-22 @ 11:55)  POCT Blood Glucose.: 165 mg/dL (08-31-22 @ 08:17)  POCT Blood Glucose.: 239 mg/dL (08-30-22 @ 22:10)  POCT Blood Glucose.: 106 mg/dL (08-30-22 @ 17:21)  POCT Blood Glucose.: 120 mg/dL (08-30-22 @ 12:12)  POCT Blood Glucose.: 112 mg/dL (08-30-22 @ 09:16)  POCT Blood Glucose.: 92 mg/dL (08-29-22 @ 21:10)  POCT Blood Glucose.: 111 mg/dL (08-29-22 @ 17:23)                            8.9    6.96  )-----------( 449      ( 01 Sep 2022 06:54 )             27.7       09-01    133<L>  |  93<L>  |  56<H>  ----------------------------<  77  4.2   |  23  |  3.84<H>    eGFR: 16<L>    Ca    9.0      09-01  Mg     1.9     09-01  Phos  4.7     09-01    TPro  7.0  /  Alb  3.0<L>  /  TBili  0.3  /  DBili  0.2  /  AST  23  /  ALT  30  /  AlkPhos  531<H>  09-01      A1C with Estimated Average Glucose Result: 6.0 % (06-30-22 @ 05:49)  A1C with Estimated Average Glucose Result: 6.6 % (04-24-22 @ 17:12)  A1C with Estimated Average Glucose Result: 7.3 % (02-04-22 @ 13:42)  A1C with Estimated Average Glucose Result: 7.2 % (02-04-22 @ 05:48)          Contact number: beeper [phone] or [phone]

## 2022-09-01 NOTE — PROVIDER CONTACT NOTE (OTHER) - RECOMMENDATIONS
can team reevaluate need for bladder scan once a shift?
any intervention at this time?
any interventions at this time
Discontinue isolation. Continue to monitor
any further interventions at this time?
Notify provider, possible straight cath?
any further interventions at this time?
can get 12 lead EKG, will have second nurse change telemetry leads again. Any further interventions at this time?
EKG
please change order to NPO except medications
Assess
Assess patient at bedside, repeat labs? Possible AM scan?
Assess pt at bedside; Send ABG; Consider duoneb treatment; Treat fever
Notify provider, administer juice via KO feed?/ dextrose?
Pt. FS assessed, VS as charted. Seizure precautions reinforced.
does team want any further interventions at this time?
will call IV team to replace IV
would team like to reinstated 14:00 hydralazine and administer now alone with finasteride and reassess in one hour?
CT scan? Provider to bedside?
Provider assessment. UE doppler/ultrasound
assess at bedside

## 2022-09-01 NOTE — PROGRESS NOTE ADULT - NUTRITIONAL ASSESSMENT
Diet, Consistent Carbohydrate w/Evening Snack:   1000mL Fluid Restriction ([id number])  No Concentrated Phosphorus  Supplement Feeding Modality:  Oral  Nepro Cans or Servings Per Day:  3       Frequency:  Daily (08-29-22 @ 16:09) [Active]

## 2022-09-01 NOTE — PROGRESS NOTE ADULT - SUBJECTIVE AND OBJECTIVE BOX
Transplant Surgery - Multidisciplinary Rounds  --------------------------------------------------------------  DDRT  Date: 4/21/2022    Present:   Patient seen and examined with multidisciplinary team including Transplant Surgeon: Dr. Tena, Transplant Nephrologist: Dr. SURESH Lomeli, Surgical resident Arlette, Transplant [facility] unit RN during am rounds.  Disciplines not in attendance will be notified of the plan.     HPI: 66M with PMH: DM type II, HTN, CAD s/p CABG in 2020, AFib on Eliquis, CVA in 2019 due to Afib, Seizure d/o following CVA, last episode was on 4/8/22, h/o GIB in 2/2022 EGD with duodenal ulcer.  ESRD due to DM was on HD since 2019.     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  Donor was 58 , KDPI 82%, DCD, single vessels and ureter, HLA mismatch 1, 2, 2. No DSA, cPRA 0%. 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 Re-admitted in May for anemia in the setting of large perinephric hematoma s/p evacuation and repair of arterial anastomosis on 5/2/22. Intra operative biopsy showed no rejection, Creatinine ranging ~2mg/dL.    In Rehab:  He was recently dx with klebsiella UTI with Ertapenem - then switched to Levaquin    He also had parainfluenza pneumonia. Was at rehab progressing well.      Hospital course:  - 6/10: transferred from rehab facility for seizure status epilepticus. Intubated for respiratory protection and transferred to MICU.  EEG showed no seizure activity. Neuro consulted.   - 6/11: Extubated. Found to have R pleural effusion. s/p Thoracentesis 1.3L. Eliquis changed to heparin drip.  Post procedure drop in H&H. 5u PRBC, 2 u FFP.   - 6/11 evening: Transferred to SICU from MICU for further care in setting of hypoxia, significant R pleural effusion, anemia and hemodynamic instability  - 6/11 Intubated at 9pm and sedated on Precedex.  CT placed, 600ml blood drained.  1L total overnight, started pressors  - 6/11 Received Protamine for PTT >100 (was on Heparin drip started for AF), 2 u FFP and 5u PRBC total  - 6/13 s/p VATS 2.2L old blood removed; second chest tube placed  - 6/18-19: chest tubes removed  - 6/21: ?PRES vs. chronic ischemic changes on MRI, off Envarsus, switched to Belatacept 6/23 with good graft function  - 6/25: Tx to SICU for refractory seizures, improved with IV antiepileptic regimen  - 7/10: Downgraded from SICU to floor.   - 7/11: OR-->URETERAL STENT REMOVED  - 7/15: ESBL Kleb UTI (50-90K), completed Ertapenem x 3 Days  - 7/25: Tx to SICU for Sepsis/ elevated LFTS  - 7/27: Shiley placed for HD  - 7/28: ongoing fevers -> started Ertapenem/Fluc  - 7/29: HD restarted + 1U PRBC.  IR bx with 2A rejection, started pulse steroids. LFTs have improved  - 8/4: refractory seizures  - 8/8: s/p derm bx with no acute finding  - 8/8: s/p permacath exchange  - 8/16: One seizure on EEG medications changed added brivaracetam  - 8/22: HD, followed by U/S of Transplant kidney    Interval Events:   - No acute events overnight.   - Remains anuric on HD, last HD was yesterday via permcath  -Was given warfarin 5 X1 yesterday  -ambulated w/ 2 person assist using walker in hallway today.      Immunosuppression:   Induction: Simulect                                               Maintenance immunosuppression: Sirolimus 7 mg QD, prednisone 10 , MMF held  Ongoing monitoring for signs of rejection.    Potential Discharge date: pending clinical improvement    Education:  Medications    Plan of care:  See Below      MEDICATIONS  (STANDING):  amLODIPine   Tablet 10 milliGRAM(s) Oral daily  artificial  tears Solution 2 Drop(s) Left EYE every 6 hours  brivaracetam 50 milliGRAM(s) Oral two times a day  cephalexin 500 milliGRAM(s) Oral every 12 hours  chlorhexidine 2% Cloths 1 Application(s) Topical <User Schedule>  chlorhexidine 4% Liquid 1 Application(s) Topical <User Schedule>  doxazosin 4 milliGRAM(s) Oral <User Schedule>  epoetin cortney-epbx (RETACRIT) Injectable 44164 Unit(s) SubCutaneous <User Schedule>  finasteride 5 milliGRAM(s) Oral daily  heparin   Injectable 5000 Unit(s) SubCutaneous every 12 hours  hydrALAZINE 100 milliGRAM(s) Oral three times a day  insulin glargine Injectable (LANTUS) 6 Unit(s) SubCutaneous at bedtime  insulin lispro (ADMELOG) corrective regimen sliding scale   SubCutaneous three times a day before meals  insulin lispro (ADMELOG) corrective regimen sliding scale   SubCutaneous at bedtime  insulin lispro Injectable (ADMELOG) 8 Unit(s) SubCutaneous three times a day with meals  levothyroxine 50 MICROGram(s) Oral daily  melatonin 6 milliGRAM(s) Oral at bedtime  multivitamin 1 Tablet(s) Oral daily  nystatin    Suspension 628408 Unit(s) Oral four times a day  pantoprazole    Tablet 40 milliGRAM(s) Oral <User Schedule>  phenytoin   Capsule 200 milliGRAM(s) Oral two times a day  polyethylene glycol 3350 17 Gram(s) Oral daily  predniSONE   Tablet 10 milliGRAM(s) Oral daily  senna 2 Tablet(s) Oral at bedtime  sirolimus 7 milliGRAM(s) Oral <User Schedule>  trimethoprim   80 mG/sulfamethoxazole 400 mG 1 Tablet(s) Oral daily  valGANciclovir 450 milliGRAM(s) Oral <User Schedule>  warfarin 5 milliGRAM(s) Oral at bedtime    MEDICATIONS  (PRN):  acetaminophen     Tablet .. 650 milliGRAM(s) Oral every 6 hours PRN Mild Pain (1 - 3)  diphenhydrAMINE 25 milliGRAM(s) Oral at bedtime PRN Insomnia  ondansetron Injectable 4 milliGRAM(s) IV Push once PRN Nausea and/or Vomiting  sodium chloride 0.9% lock flush 10 milliLiter(s) IV Push every 1 hour PRN Pre/post blood products, medications, blood draw, and to maintain line patency      PAST MEDICAL & SURGICAL HISTORY:  Hypertension      Diabetes      Dyslipidemia      CAD (Coronary Artery Disease)  with Stents in 06/2009 and 6/2019, s/p off-pump C3L on 8/13/20      Hypothyroidism      CVA (cerebral vascular accident)  12/13/19 with residual bilateral weakness      Anemia      PEG (percutaneous endoscopic gastrostomy) status  removed July 2020      Intubation of airway performed without difficulty  Dec 2019  CVA c/b status epilepticus requiring intubation and PEG in 12/2019-      ESRD on dialysis  M/W/F      Seizures  after CVA, last seizure was last week 4/07/22      History of insertion of stent into coronary artery bypass graft  2009 and 2019      S/P CABG x 3  off pump C3L on 8/13/20          Vital Signs Last 24 Hrs  T(C): 36.8 (01 Sep 2022 09:14), Max: 36.9 (31 Aug 2022 21:40)  T(F): 98.3 (01 Sep 2022 09:14), Max: 98.4 (31 Aug 2022 21:40)  HR: 73 (01 Sep 2022 09:14) (73 - 82)  BP: 136/67 (01 Sep 2022 09:14) (136/67 - 171/78)  BP(mean): --  RR: 20 (01 Sep 2022 09:14) (18 - 20)  SpO2: 96% (01 Sep 2022 09:14) (96% - 100%)    Parameters below as of 01 Sep 2022 09:14  Patient On (Oxygen Delivery Method): room air        I&O's Summary    31 Aug 2022 07:01  -  01 Sep 2022 07:00  --------------------------------------------------------  IN: 1760 mL / OUT: 2800 mL / NET: -1040 mL    01 Sep 2022 07:01  -  01 Sep 2022 13:39  --------------------------------------------------------  IN: 360 mL / OUT: 0 mL / NET: 360 mL                              8.9    6.96  )-----------( 449      ( 01 Sep 2022 06:54 )             27.7     09-01    133<L>  |  93<L>  |  56<H>  ----------------------------<  77  4.2   |  23  |  3.84<H>    Ca    9.0      01 Sep 2022 06:54  Phos  4.7     09-01  Mg     1.9     09-01    TPro  7.0  /  Alb  3.0<L>  /  TBili  0.3  /  DBili  0.2  /  AST  23  /  ALT  30  /  AlkPhos  531<H>  09-01    Review of systems  Gen: No weight changes, fatigue, fevers/chills, weakness  Skin: No rashes  Head/Eyes/Ears/Mouth: No headache; Normal hearing; Normal vision w/o blurriness; No sinus pain/discomfort, sore throat  Respiratory: No dyspnea, cough, wheezing, hemoptysis  CV: No chest pain, PND, orthopnea  GI: denies abdominal pain/flank pain; denies diarrhea, constipation, nausea, vomiting, melena, hematochezia  : No increased frequency, dysuria, hematuria, nocturia  MSK: No joint pain/swelling; no back pain; no edema  Neuro: No dizziness/lightheadedness, weakness, seizures, numbness, tingling  Heme: No easy bruising or bleeding  Endo: No heat/cold intolerance  Psych: No significant nervousness, anxiety, stress, depression   All other systems were reviewed and are negative, except as noted.      PHYSICAL EXAM:  Constitutional: Well developed / well nourished  Eyes: Anicteric  Respiratory: nonlabored   Cardiovascular: normotensive, regular rate   Gastrointestinal: Soft, non distended, No tenderness at the incision site and Rt flank, incision well healed   Extremities: SCD's in place and working bilaterally  Neurological: awake, alert, answering questions appropriately   Skin: RLQ/R flank erythema/firm to touch  Musculoskeletal: Moving all extremities  Psychiatric: Responsive

## 2022-09-01 NOTE — PROGRESS NOTE ADULT - ASSESSMENT
66 y/  M with Type 2 DM> unknown control due to skewed A1C 6.6% secondary to chronic anemia and s/p blood tx. On basal/bolus insulin therapy PTA. DM c/b ESRD s/p DDRT on 3/21, CVA, CAD s/p CABG, Afib on apixaban, seizure activity, HTN, HLD, h/o GI bleed in 2/2022 EGD with duodenal ulcer, discharged to [facility] rehab center after prior hospitalization, now re-admitted from [facility] for seizures c/f status epilepticus in setting of UTI. Endocrine consulted for steroid induced hyperglycemia.  Prolonged hospital course w/ ICU stay, previously intubated/extubated, previous seizures. S/p ureteral stent removal 7/12/22. 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 Remains on steroid. BG values mostly at goal w/tightly controlled fasting glucose today. BG goal (100-200mg/dl).

## 2022-09-01 NOTE — PROGRESS NOTE ADULT - PROBLEM SELECTOR PLAN 1
-Test BG AC/HS  -Decrease Lantus dose to 5 units QHS  -C/w Admelog 8 units ac meals and administer 5 units for BG <120 if pt eats. HOLD IF PT NOT EATING.  No pattern to make futher changes at this time.   -c/w Admelog low correction scale AC and low HS scale  -notify endocrine team if steroids changed as insulin regimen will need adjustment  Discharge planning:   - plan to rehab, likely will need basal bolus insulin final doses tbd  Outpatient f/u with Endocrinologist Dr. Lincoln. [address]. Phone  Needs apt at time of discharge  -Needs optho/renal/cardiac f/u as out pt  -Make sure pt has insulin and DM supplies at time of discharge

## 2022-09-01 NOTE — PROVIDER CONTACT NOTE (OTHER) - DATE AND TIME:
20-Aug-2022 17:30
21-Aug-2022 18:58
22-Jul-2022 21:00
24-Jun-2022 21:23
25-Jun-2022 21:45
01-Sep-2022 15:08
04-Aug-2022 10:30
08-Aug-2022 14:00
28-Jun-2022 05:45
04-Jul-2022 02:18
05-Aug-2022 04:02
08-Aug-2022 23:00
13-Jul-2022 13:15
13-Jul-2022 14:40
14-Jul-2022 01:00
15-Aug-2022 05:00
15-Jul-2022 21:00
18-Aug-2022 16:50
18-Jun-2022 02:30
21-Jul-2022 21:40
24-Jun-2022 03:30
25-Jul-2022 17:45
04-Aug-2022 03:00
13-Aug-2022 01:20
19-Aug-2022 18:00
24-Jun-2022 01:10
04-Aug-2022 01:30
24-Jun-2022 05:00
10-Jul-2022 17:30
12-Jul-2022 07:36
12-Jul-2022 18:43
12-Jul-2022 17:17

## 2022-09-01 NOTE — PROGRESS NOTE ADULT - ASSESSMENT
66M with h/o DM type II, HTN, CAD s/p CABG in 2020, Afib on Eliquis, CVA in 2019 due to Afib, Seizure d/o (last episode was on 4/8/22), h/o GI bleed in 2/2022 EGD with duodenal ulcer and ESRD on HD s/p R DDRT from DCD donor on 4/21/22 complicated by DGF requiring HD until 4/29/22 who presented with status epilepticus in setting of UTI/antibiotics and sub-therapeutic valproic acid level. Transferred to SICU on 7/25 with signs of sepsis, now with refractory seizures. Last reported seizure on 8/16. Mental status continuing to improve.     Seizure Disorder:  - Neuro/Epilepsy Teams following. Avoid Fycompa 2/2 agitation in past  - 1 min seizure with clonic movements noted on EEG 8/16. No further seizures noted.  EEG 8/16-8/18  - Continue additional anti-seizure medication Briviact 50mg BID with additional 50mg post each HD session  - On Phenytoin and Briviact  - Appreciate neuro recommendations to wean and discontinue Phenytoin and to continue Briviact.  Given prolonged hospital course and current stable regimen, would prefer to make medication adjustments as outpatient.  Discussed with Neuro  - prior MRI head 6/27 with less concerns for PRES, likely ischemic changes  - o/n CTH 8/3 with no acute findings  - Follow up with neuro regarding seizure medications recs for discharge      R DCD DDRT 4/21/22 c/b 2A ACR  - 2A ACR: s/p pulse steroids. Continue Prednisone 10mg QD  - HD/UF as per nephrology for fluid overload/anuria  - 8/14 surveillance BCx negative  - Repeated US from 8/25 did not show any renal artery stenosis, but increased resistive index, consistent with parynchyma disease was noted  - S/P removal of ureteral stent on 7/12  - 8/8 Punch biopsy wound Culture: ngtd. Negative for PTLD  - DVT ppx with SQH  - fungitel negative  - LE studies WNL    RLQ Cellulitis:  - Deferring right psoas muscle/fascia biopsy as wound is not worsening  - Continue Keflex    Immunosuppression:   - Belatacept: 6/23-8/1 discontinued  - Sirolimus 7 mg QD   - Pred 10mg QD  - PPX:  Nystatin, bactrim, valcyte (MWF)    DM  - Labile blood glucose  - On Lantus 6U and pre-meal 8U with ISS coverage  - Fingerstick ac and qhs  - Endo recs appreciated    AFib/R IJ DVT   - Eliquis with ~40% less efficacy while on fosphenytoin  - Lovenox held for ANA and transaminitis   - rate controlled  -  BID  - Re-dose coumadin 5 tonight.     HTN:  - Amlodipine/Doxazosin. Increase hydralazine to 100 TID.   - off Coreg due to bradycardia    BPH:  - Continue Doxazosin/Proscar

## 2022-09-01 NOTE — PROGRESS NOTE ADULT - PROBLEM SELECTOR PLAN 4
Anemia in the setting of renal disease. Will start with EPO 10K untis three times per week with HD. Hg below goal at this time. Monitor. Hg.     If any questions, please feel free to contact me     Steven Hyatt  Nephrology Fellow  [facility] Pager: [phone]

## 2022-09-01 NOTE — PROGRESS NOTE ADULT - NS ATTEND AMEND GEN_ALL_CORE FT
IMMUNOSUPPRESSION:  On sirolimus 7 mg daily by mouth  Aim for level of 5-6  Prednisone: 10 mg daily  Dose modification:   -	No change in sirolimus dose    OTHER:  Kidney transplant recipient.  Alert and responsive  Abdomen soft and non-tender  Continue current treatment

## 2022-09-01 NOTE — PROGRESS NOTE ADULT - PROBLEM SELECTOR PLAN 3
Bp has been above goal. Currently on amlodipine 10mg and will increase hydralazine to 100mg TID. Monitor BP.

## 2022-09-02 NOTE — PROGRESS NOTE ADULT - SUBJECTIVE AND OBJECTIVE BOX
Transplant Surgery - Multidisciplinary Rounds  --------------------------------------------------------------  DDRT  Date: 4/21/2022    Present:   Patient seen and examined with multidisciplinary team including Transplant Surgeon: Dr. Tena, Transplant Nephrologist: Dr. SURESH Lomeli, Surgical resident Arlette, Transplant [facility] unit RN during am rounds.  Disciplines not in attendance will be notified of the plan.     HPI: 66M with PMH: DM type II, HTN, CAD s/p CABG in 2020, AFib on Eliquis, CVA in 2019 due to Afib, Seizure d/o following CVA, last episode was on 4/8/22, h/o GIB in 2/2022 EGD with duodenal ulcer.  ESRD due to DM was on HD since 2019.     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  Donor was 58 , KDPI 82%, DCD, single vessels and ureter, HLA mismatch 1, 2, 2. No DSA, cPRA 0%. 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 Re-admitted in May for anemia in the setting of large perinephric hematoma s/p evacuation and repair of arterial anastomosis on 5/2/22. Intra operative biopsy showed no rejection, Creatinine ranging ~2mg/dL.    In Rehab:  He was recently dx with klebsiella UTI with Ertapenem - then switched to Levaquin    He also had parainfluenza pneumonia. Was at rehab progressing well.      Hospital course:  - 6/10: transferred from rehab facility for seizure status epilepticus. Intubated for respiratory protection and transferred to MICU.  EEG showed no seizure activity. Neuro consulted.   - 6/11: Extubated. Found to have R pleural effusion. s/p Thoracentesis 1.3L. Eliquis changed to heparin drip.  Post procedure drop in H&H. 5u PRBC, 2 u FFP.   - 6/11 evening: Transferred to SICU from MICU for further care in setting of hypoxia, significant R pleural effusion, anemia and hemodynamic instability  - 6/11 Intubated at 9pm and sedated on Precedex.  CT placed, 600ml blood drained.  1L total overnight, started pressors  - 6/11 Received Protamine for PTT >100 (was on Heparin drip started for AF), 2 u FFP and 5u PRBC total  - 6/13 s/p VATS 2.2L old blood removed; second chest tube placed  - 6/18-19: chest tubes removed  - 6/21: ?PRES vs. chronic ischemic changes on MRI, off Envarsus, switched to Belatacept 6/23 with good graft function  - 6/25: Tx to SICU for refractory seizures, improved with IV antiepileptic regimen  - 7/10: Downgraded from SICU to floor.   - 7/11: OR-->URETERAL STENT REMOVED  - 7/15: ESBL Kleb UTI (50-90K), completed Ertapenem x 3 Days  - 7/25: Tx to SICU for Sepsis/ elevated LFTS  - 7/27: Shiley placed for HD  - 7/28: ongoing fevers -> started Ertapenem/Fluc  - 7/29: HD restarted + 1U PRBC.  IR bx with 2A rejection, started pulse steroids. LFTs have improved  - 8/4: refractory seizures  - 8/8: s/p derm bx with no acute finding  - 8/8: s/p permacath exchange  - 8/16: One seizure on EEG medications changed added brivaracetam  - 8/22: HD, followed by U/S of Transplant kidney    Interval Events:   - No acute events overnight.   - Remains anuric on HD, last HD was yesterday via permcath  -Was given warfarin 5 mg (2nd dose yesterday)  -ambulated w/ 2 person assist using walker in hallway yesterday      Immunosuppression:   Induction: Simulect                                               Maintenance immunosuppression: Sirolimus 7 mg QD, prednisone 10 , MMF held  Ongoing monitoring for signs of rejection.    Potential Discharge date: pending clinical improvement    Education:  Medications    Plan of care:  See Below      MEDICATIONS  (STANDING):  amLODIPine   Tablet 10 milliGRAM(s) Oral daily  artificial  tears Solution 2 Drop(s) Left EYE every 6 hours  brivaracetam 50 milliGRAM(s) Oral two times a day  cephalexin 500 milliGRAM(s) Oral every 12 hours  chlorhexidine 2% Cloths 1 Application(s) Topical <User Schedule>  chlorhexidine 4% Liquid 1 Application(s) Topical <User Schedule>  doxazosin 4 milliGRAM(s) Oral <User Schedule>  epoetin cortney-epbx (RETACRIT) Injectable 46520 Unit(s) SubCutaneous <User Schedule>  finasteride 5 milliGRAM(s) Oral daily  heparin   Injectable 5000 Unit(s) SubCutaneous every 12 hours  hydrALAZINE 100 milliGRAM(s) Oral three times a day  insulin glargine Injectable (LANTUS) 5 Unit(s) SubCutaneous at bedtime  insulin lispro (ADMELOG) corrective regimen sliding scale   SubCutaneous three times a day before meals  insulin lispro (ADMELOG) corrective regimen sliding scale   SubCutaneous at bedtime  insulin lispro Injectable (ADMELOG) 8 Unit(s) SubCutaneous three times a day with meals  levothyroxine 50 MICROGram(s) Oral daily  melatonin 6 milliGRAM(s) Oral at bedtime  multivitamin 1 Tablet(s) Oral daily  nystatin    Suspension 476847 Unit(s) Oral four times a day  pantoprazole    Tablet 40 milliGRAM(s) Oral <User Schedule>  phenytoin   Capsule 200 milliGRAM(s) Oral two times a day  polyethylene glycol 3350 17 Gram(s) Oral daily  predniSONE   Tablet 10 milliGRAM(s) Oral daily  senna 2 Tablet(s) Oral at bedtime  sirolimus 7 milliGRAM(s) Oral <User Schedule>  trimethoprim   80 mG/sulfamethoxazole 400 mG 1 Tablet(s) Oral daily  valGANciclovir 450 milliGRAM(s) Oral <User Schedule>  warfarin 5 milliGRAM(s) Oral at bedtime    MEDICATIONS  (PRN):  acetaminophen     Tablet .. 650 milliGRAM(s) Oral every 6 hours PRN Mild Pain (1 - 3)  diphenhydrAMINE 25 milliGRAM(s) Oral at bedtime PRN Insomnia  ondansetron Injectable 4 milliGRAM(s) IV Push once PRN Nausea and/or Vomiting  sodium chloride 0.9% lock flush 10 milliLiter(s) IV Push every 1 hour PRN Pre/post blood products, medications, blood draw, and to maintain line patency      PAST MEDICAL & SURGICAL HISTORY:  Hypertension      Diabetes      Dyslipidemia      CAD (Coronary Artery Disease)  with Stents in 06/2009 and 6/2019, s/p off-pump C3L on 8/13/20      Hypothyroidism      CVA (cerebral vascular accident)  12/13/19 with residual bilateral weakness      Anemia      PEG (percutaneous endoscopic gastrostomy) status  removed July 2020      Intubation of airway performed without difficulty  Dec 2019  CVA c/b status epilepticus requiring intubation and PEG in 12/2019-      ESRD on dialysis  M/W/F      Seizures  after CVA, last seizure was last week 4/07/22      History of insertion of stent into coronary artery bypass graft  2009 and 2019      S/P CABG x 3  off pump C3L on 8/13/20          Vital Signs Last 24 Hrs  T(C): 36.9 (02 Sep 2022 13:30), Max: 37.1 (01 Sep 2022 16:43)  T(F): 98.4 (02 Sep 2022 13:30), Max: 98.7 (01 Sep 2022 16:43)  HR: 73 (02 Sep 2022 13:30) (71 - 81)  BP: 185/76 (02 Sep 2022 13:30) (155/58 - 185/76)  BP(mean): 106 (02 Sep 2022 06:18) (101 - 106)  RR: 18 (02 Sep 2022 13:30) (18 - 20)  SpO2: 100% (02 Sep 2022 13:30) (95% - 100%)    Parameters below as of 02 Sep 2022 13:30  Patient On (Oxygen Delivery Method): room air        I&O's Summary    01 Sep 2022 07:01  -  02 Sep 2022 07:00  --------------------------------------------------------  IN: 720 mL / OUT: 0 mL / NET: 720 mL                              8.4    6.08  )-----------( 454      ( 02 Sep 2022 06:48 )             26.5     09-02    132<L>  |  91<L>  |  81<H>  ----------------------------<  177<H>  4.6   |  22  |  4.78<H>    Ca    8.9      02 Sep 2022 06:53  Phos  5.6     09-02  Mg     2.1     09-02    TPro  6.8  /  Alb  2.7<L>  /  TBili  0.4  /  DBili  0.2  /  AST  19  /  ALT  26  /  AlkPhos  477<H>  09-02      Review of systems  Gen: No weight changes, fatigue, fevers/chills, weakness  Skin: No rashes  Head/Eyes/Ears/Mouth: No headache; Normal hearing; Normal vision w/o blurriness; No sinus pain/discomfort, sore throat  Respiratory: No dyspnea, cough, wheezing, hemoptysis  CV: No chest pain, PND, orthopnea  GI: denies abdominal pain/flank pain; denies diarrhea, constipation, nausea, vomiting, melena, hematochezia  : No increased frequency, dysuria, hematuria, nocturia  MSK: No joint pain/swelling; no back pain; no edema  Neuro: No dizziness/lightheadedness, weakness, seizures, numbness, tingling  Heme: No easy bruising or bleeding  Endo: No heat/cold intolerance  Psych: No significant nervousness, anxiety, stress, depression   All other systems were reviewed and are negative, except as noted.      PHYSICAL EXAM:  Constitutional: Well developed / well nourished  Eyes: Anicteric  Respiratory: nonlabored   Cardiovascular: normotensive, regular rate   Gastrointestinal: Soft, non distended, No tenderness at the incision site and Rt flank, incision well healed   Extremities: SCD's in place and working bilaterally  Neurological: awake, alert, answering questions appropriately   Skin: RLQ/R flank erythema/firm to touch  Musculoskeletal: Moving all extremities  Psychiatric: Responsive

## 2022-09-02 NOTE — PROGRESS NOTE ADULT - ASSESSMENT
66M with h/o DM type II, HTN, CAD s/p CABG in 2020, Afib on Eliquis, CVA in 2019 due to Afib, Seizure d/o (last episode was on 4/8/22), h/o GI bleed in 2/2022 EGD with duodenal ulcer and ESRD on HD s/p R DDRT from DCD donor on 4/21/22 complicated by DGF requiring HD until 4/29/22 who presented with status epilepticus in setting of UTI/antibiotics and sub-therapeutic valproic acid level. Transferred to SICU on 7/25 with signs of sepsis, now with refractory seizures. Last reported seizure on 8/16. Mental status continuing to improve.     Seizure Disorder:  - Neuro/Epilepsy Teams following. Avoid Fycompa 2/2 agitation in past  - 1 min seizure with clonic movements noted on EEG 8/16. No further seizures noted.  EEG 8/16-8/18  - Continue additional anti-seizure medication Briviact 50mg BID with additional 50mg post each HD session  - On Phenytoin and Briviact  - Appreciate neuro recommendations to wean and discontinue Phenytoin and to continue Briviact.  Given prolonged hospital course and current stable regimen, would prefer to make medication adjustments as outpatient.  Discussed with Neuro  - prior MRI head 6/27 with less concerns for PRES, likely ischemic changes  - o/n CTH 8/3 with no acute findings  - Follow up with neuro regarding seizure medications recs for discharge      R DCD DDRT 4/21/22 c/b 2A ACR  - 2A ACR: s/p pulse steroids. Continue Prednisone 10mg QD  - HD/UF as per nephrology for fluid overload/anuria  - 8/14 surveillance BCx negative  - Repeated US from 8/25 did not show any renal artery stenosis, but increased resistive index, consistent with parynchyma disease was noted  - S/P removal of ureteral stent on 7/12  - 8/8 Punch biopsy wound Culture: ngtd. Negative for PTLD  - DVT ppx with SQH  - fungitel negative  - LE studies WNL    RLQ Cellulitis:  - Deferring right psoas muscle/fascia biopsy as wound is not worsening  - Continue Keflex    Immunosuppression:   - Belatacept: 6/23-8/1 discontinued  - Sirolimus 7 mg QD   - Pred 10mg QD  - PPX: Nystatin, bactrim, valcyte (MWF)    DM  - Labile blood glucose  - On Lantus 6U and pre-meal 8U with ISS coverage  - Fingerstick ac and qhs  - Endo recs appreciated    AFib/R IJ DVT   - Eliquis with ~40% less efficacy while on fosphenytoin  - Lovenox held for ANA and transaminitis   - rate controlled  -  BID  - Re-dose coumadin 5 today (3rd dose)    HTN:  - Amlodipine/Doxazosin. Increase hydralazine to 100 TID.   - off Coreg due to bradycardia    BPH:  - Continue Doxazosin/Proscar

## 2022-09-02 NOTE — PROGRESS NOTE ADULT - PROBLEM SELECTOR PLAN 2
IS meds- was on Belatacept (started after rejection). Now on Sirolimus 7mg daily (goal level 4-6) and prednisone 10mg daily.

## 2022-09-02 NOTE — PROGRESS NOTE ADULT - SUBJECTIVE AND OBJECTIVE BOX
[facility]   MD NINA MASON MD RUORU WONG, PA    TEL:  FROM 9 AM to 5 PM ---OFFICE: [phone]    FROM 5 PM - 9 AM PLEASE CALL ANSWERING SERVICE: [phone]    RENAL FOLLOW UP NOTE--Date of Service 09-02-22 @ 07:54  --------------------------------------------------------------------------------  HPI:      Pt seen and examined at bedside.       PAST HISTORY  --------------------------------------------------------------------------------  No significant changes to PMH, PSH, FHx, SHx, unless otherwise noted    ALLERGIES & MEDICATIONS  --------------------------------------------------------------------------------  Allergies    No Known Allergies    Intolerances      Standing Inpatient Medications  amLODIPine   Tablet 10 milliGRAM(s) Oral daily  artificial  tears Solution 2 Drop(s) Left EYE every 6 hours  brivaracetam 50 milliGRAM(s) Oral two times a day  cephalexin 500 milliGRAM(s) Oral every 12 hours  chlorhexidine 2% Cloths 1 Application(s) Topical <User Schedule>  chlorhexidine 4% Liquid 1 Application(s) Topical <User Schedule>  doxazosin 4 milliGRAM(s) Oral <User Schedule>  epoetin cortney-epbx (RETACRIT) Injectable 05938 Unit(s) SubCutaneous <User Schedule>  finasteride 5 milliGRAM(s) Oral daily  heparin   Injectable 5000 Unit(s) SubCutaneous every 12 hours  hydrALAZINE 100 milliGRAM(s) Oral three times a day  insulin glargine Injectable (LANTUS) 5 Unit(s) SubCutaneous at bedtime  insulin lispro (ADMELOG) corrective regimen sliding scale   SubCutaneous three times a day before meals  insulin lispro (ADMELOG) corrective regimen sliding scale   SubCutaneous at bedtime  insulin lispro Injectable (ADMELOG) 8 Unit(s) SubCutaneous three times a day with meals  levothyroxine 50 MICROGram(s) Oral daily  melatonin 6 milliGRAM(s) Oral at bedtime  multivitamin 1 Tablet(s) Oral daily  nystatin    Suspension 735714 Unit(s) Oral four times a day  pantoprazole    Tablet 40 milliGRAM(s) Oral <User Schedule>  phenytoin   Capsule 200 milliGRAM(s) Oral two times a day  polyethylene glycol 3350 17 Gram(s) Oral daily  predniSONE   Tablet 10 milliGRAM(s) Oral daily  senna 2 Tablet(s) Oral at bedtime  sirolimus 7 milliGRAM(s) Oral <User Schedule>  trimethoprim   80 mG/sulfamethoxazole 400 mG 1 Tablet(s) Oral daily  valGANciclovir 450 milliGRAM(s) Oral <User Schedule>    PRN Inpatient Medications  acetaminophen     Tablet .. 650 milliGRAM(s) Oral every 6 hours PRN  diphenhydrAMINE 25 milliGRAM(s) Oral at bedtime PRN  ondansetron Injectable 4 milliGRAM(s) IV Push once PRN  sodium chloride 0.9% lock flush 10 milliLiter(s) IV Push every 1 hour PRN      REVIEW OF SYSTEMS  --------------------------------------------------------------------------------  General: no fever  MSK: no edema     VITALS/PHYSICAL EXAM  --------------------------------------------------------------------------------  T(C): 36.7 (09-02-22 @ 06:18), Max: 37.1 (09-01-22 @ 16:43)  HR: 76 (09-02-22 @ 06:18) (73 - 81)  BP: 172/74 (09-02-22 @ 06:18) (136/67 - 175/71)  RR: 18 (09-02-22 @ 06:18) (18 - 20)  SpO2: 99% (09-02-22 @ 06:18) (95% - 100%)  Wt(kg): --        09-01-22 @ 07:01  -  09-02-22 @ 07:00  --------------------------------------------------------  IN: 720 mL / OUT: 0 mL / NET: 720 mL      Physical Exam:  	Gen: NAD  	HEENT: MMM  	Pulm: CTA B/L  	CV: S1S2  	Abd: Soft, +BS  	Ext: No LE edema B/L                      Neuro: Awake   	Skin: Warm and Dry   	Vascular access:  HD catheter           FABIÁN tucker  LABS/STUDIES  --------------------------------------------------------------------------------              8.4    6.08  >-----------<  454      [09-02-22 @ 06:48]              26.5     132  |  91  |  81  ----------------------------<  177      [09-02-22 @ 06:53]  4.6   |  22  |  4.78        Ca     8.9     [09-02-22 @ 06:53]      Mg     2.1     [09-02-22 @ 06:53]      Phos  5.6     [09-02-22 @ 06:53]    TPro  6.8  /  Alb  2.7  /  TBili  0.4  /  DBili  0.2  /  AST  19  /  ALT  26  /  AlkPhos  477  [09-02-22 @ 06:53]    PT/INR: PT 16.2 , INR 1.39       [08-31-22 @ 09:59]  PTT: 31.3       [09-02-22 @ 06:47]      Creatinine Trend:  SCr 4.78 [09-02 @ 06:53]  SCr 3.84 [09-01 @ 06:54]  SCr 5.10 [08-31 @ 06:35]  SCr 3.99 [08-30 @ 06:17]  SCr 5.65 [08-29 @ 06:30]    Urinalysis - [08-16-22 @ 18:49]      Color Yellow / Appearance Slightly Turbid / SG 1.018 / pH 8.5      Gluc 200 mg/dL / Ketone Negative  / Bili Negative / Urobili Negative       Blood Large / Protein >600 / Leuk Est Moderate / Nitrite Positive      RBC 13 / WBC 40 / Hyaline 7 / Gran  / Sq Epi  / Non Sq Epi 4 / Bacteria Moderate      Iron 17, TIBC 171, %sat 10      [05-02-22 @ 13:03]  Ferritin 6336      [07-27-22 @ 13:00]  PTH -- (Ca 9.2)      [02-05-22 @ 10:13]   294  HbA1c 6.0      [02-21-20 @ 08:53]  TSH 4.69      [07-06-22 @ 18:36]  Lipid: chol --, , HDL --, LDL --      [07-27-22 @ 13:00]

## 2022-09-02 NOTE — PROGRESS NOTE ADULT - SUBJECTIVE AND OBJECTIVE BOX
[facility] DIVISION OF KIDNEY DISEASES AND HYPERTENSION -- FOLLOW UP NOTE  --------------------------------------------------------------------------------      HPI: 66y old  Male h/o DM type II, HTN, CAD s/p CABG in 2020, Afib on Eliquis, CVA in 2019 due to Afib, Hypothyroidism, Seizure d/o, h/o GI bleed in 2/2022 EGD with duodenal ulcer and ESRD on HD s/p R DDRT from DCD donor on 4/21/22 complicated by DGF requiring HD until 4/29/22, later had good graft function who was readmitted with status epilepticus in setting of UTI/antibiotics and sub-therapeutic valproic acid level.  Envarsus was discontinued for seizures and poor mental status and he was started on belatacept. Subsequent ANA due to rejection. 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 Received pulse steroids (Solumedrol 500 on 7/30 -> 250 on 7/31). Did not receive thymo due to frailty. Currently HD dependent.     Pt. seen and examined at bedside. Pt. denies any chest pain. shortness of breath, nausea, vomiting, or abdominal pain. Last HD on 8/31/22. Tolerated well. Underwent L IJ Tunneled HD catheter placement on 8/30/22.       Standing Inpatient Medications  amLODIPine   Tablet 10 milliGRAM(s) Oral daily  artificial  tears Solution 2 Drop(s) Left EYE every 6 hours  brivaracetam 50 milliGRAM(s) Oral two times a day  cephalexin 500 milliGRAM(s) Oral every 12 hours  chlorhexidine 2% Cloths 1 Application(s) Topical <User Schedule>  chlorhexidine 4% Liquid 1 Application(s) Topical <User Schedule>  doxazosin 4 milliGRAM(s) Oral <User Schedule>  epoetin cortney-epbx (RETACRIT) Injectable 47012 Unit(s) SubCutaneous <User Schedule>  finasteride 5 milliGRAM(s) Oral daily  heparin   Injectable 5000 Unit(s) SubCutaneous every 12 hours  hydrALAZINE 100 milliGRAM(s) Oral three times a day  insulin glargine Injectable (LANTUS) 5 Unit(s) SubCutaneous at bedtime  insulin lispro (ADMELOG) corrective regimen sliding scale   SubCutaneous three times a day before meals  insulin lispro (ADMELOG) corrective regimen sliding scale   SubCutaneous at bedtime  insulin lispro Injectable (ADMELOG) 8 Unit(s) SubCutaneous three times a day with meals  levothyroxine 50 MICROGram(s) Oral daily  melatonin 6 milliGRAM(s) Oral at bedtime  multivitamin 1 Tablet(s) Oral daily  nystatin    Suspension 414336 Unit(s) Oral four times a day  pantoprazole    Tablet 40 milliGRAM(s) Oral <User Schedule>  phenytoin   Capsule 200 milliGRAM(s) Oral two times a day  polyethylene glycol 3350 17 Gram(s) Oral daily  predniSONE   Tablet 10 milliGRAM(s) Oral daily  senna 2 Tablet(s) Oral at bedtime  sirolimus 7 milliGRAM(s) Oral <User Schedule>  trimethoprim   80 mG/sulfamethoxazole 400 mG 1 Tablet(s) Oral daily  valGANciclovir 450 milliGRAM(s) Oral <User Schedule>    PRN Inpatient Medications  acetaminophen     Tablet .. 650 milliGRAM(s) Oral every 6 hours PRN  diphenhydrAMINE 25 milliGRAM(s) Oral at bedtime PRN  ondansetron Injectable 4 milliGRAM(s) IV Push once PRN  sodium chloride 0.9% lock flush 10 milliLiter(s) IV Push every 1 hour PRN    ROS:   All negative except as mentioned above.     VITALS/PHYSICAL EXAM  --------------------------------------------------------------------------------  T(C): 36.6 (09-02-22 @ 09:24), Max: 37.1 (09-01-22 @ 16:43)  HR: 71 (09-02-22 @ 09:24) (71 - 81)  BP: 171/78 (09-02-22 @ 09:24) (155/58 - 175/71)  RR: 20 (09-02-22 @ 09:24) (18 - 20)  SpO2: 96% (09-02-22 @ 09:24) (95% - 100%)  Wt(kg): --        09-01-22 @ 07:01  -  09-02-22 @ 07:00  --------------------------------------------------------  IN: 720 mL / OUT: 0 mL / NET: 720 mL      Physical Exam:  	Gen: NAD   	HEENT: PERRL, MMM   	Pulm: CTA B/L, no crackles   	CV: RRR, S1S2+  	Abd: +BS, soft, RLQ with erythema, no tenderness               Transplant: No tenderness, swelling  	: No suprapubic tenderness  	MSK: trace edema   	Psych: Normal affect and mood  	Skin: Warm              Access: Left IJ tunneled HD catheter +     LABS/STUDIES  --------------------------------------------------------------------------------              8.4    6.08  >-----------<  454      [09-02-22 @ 06:48]              26.5     132  |  91  |  81  ----------------------------<  177      [09-02-22 @ 06:53]  4.6   |  22  |  4.78        Ca     8.9     [09-02-22 @ 06:53]      Mg     2.1     [09-02-22 @ 06:53]      Phos  5.6     [09-02-22 @ 06:53]    TPro  6.8  /  Alb  2.7  /  TBili  0.4  /  DBili  0.2  /  AST  19  /  ALT  26  /  AlkPhos  477  [09-02-22 @ 06:53]      PTT: 31.3       [09-02-22 @ 06:47]      Creatinine Trend:  SCr 4.78 [09-02 @ 06:53]  SCr 3.84 [09-01 @ 06:54]  SCr 5.10 [08-31 @ 06:35]  SCr 3.99 [08-30 @ 06:17]  SCr 5.65 [08-29 @ 06:30]        Sirolimus (Rapamycin) Level, Serum: 4.3 ng/mL (09-01 @ 06:54)  Sirolimus (Rapamycin) Level, Serum: 4.6 ng/mL (08-31 @ 06:35)  Sirolimus (Rapamycin) Level, Serum: 3.1 ng/mL (08-30 @ 06:17)  Sirolimus (Rapamycin) Level, Serum: 3.9 ng/mL (08-29 @ 06:34)          CAPILLARY BLOOD GLUCOSE      POCT Blood Glucose.: 169 mg/dL (02 Sep 2022 08:10)  POCT Blood Glucose.: 130 mg/dL (01 Sep 2022 21:42)  POCT Blood Glucose.: 261 mg/dL (01 Sep 2022 17:16)  POCT Blood Glucose.: 276 mg/dL (01 Sep 2022 12:31)      Urinalysis - [08-16-22 @ 18:49]      Color Yellow / Appearance Slightly Turbid / SG 1.018 / pH 8.5      Gluc 200 mg/dL / Ketone Negative  / Bili Negative / Urobili Negative       Blood Large / Protein >600 / Leuk Est Moderate / Nitrite Positive      RBC 13 / WBC 40 / Hyaline 7 / Gran  / Sq Epi  / Non Sq Epi 4 / Bacteria Moderate      Iron 17, TIBC 171, %sat 10      [05-02-22 @ 13:03]  Ferritin 6336      [07-27-22 @ 13:00]  PTH -- (Ca 9.2)      [02-05-22 @ 10:13]   294  HbA1c 6.0      [02-21-20 @ 08:53]  TSH 4.69      [07-06-22 @ 18:36]  Lipid: chol --, , HDL --, LDL --      [07-27-22 @ 13:00]

## 2022-09-02 NOTE — PROGRESS NOTE ADULT - NUTRITIONAL ASSESSMENT
This patient has been assessed with a concern for Malnutrition and has been determined to have a diagnosis/diagnoses of Severe protein-calorie malnutrition.    This patient is being managed with:   Diet Consistent Carbohydrate w/Evening Snack-  1000mL Fluid Restriction ([id number])  No Concentrated Phosphorus  Supplement Feeding Modality:  Oral  Nepro Cans or Servings Per Day:  3       Frequency:  Daily  Entered: Aug 29 2022  4:09PM

## 2022-09-02 NOTE — PROGRESS NOTE ADULT - PROBLEM SELECTOR PLAN 1
-Test BG AC/HS  -Decrease Lantus dose to 5 units QHS  -C/w Admelog 8 units ac meals and administer 5 units for BG <120 if pt eats. HOLD IF PT NOT EATING.  No pattern to make futher changes at this time.   -c/w Admelog low correction scale AC and low HS scale  -notify endocrine team if steroids changed as insulin regimen will need adjustment  Discharge planning:   - plan to rehab, likely will need basal bolus insulin final doses tbd  Outpatient f/u with Endocrinologist Dr. Lincoln. [address] [facility] [address] Phone  Needs apt at time of discharge  -Needs optho/renal/cardiac f/u as out pt  -Make sure pt has insulin and DM supplies at time of discharge -Test BG AC/HS  -c/w Lantus 5 units QHS  -Increase Admelog 10 units ac meals and administer 5 units for BG <120 if pt eats. HOLD IF PT NOT EATING.  -c/w Admelog low correction scale AC and low HS scale  -notify endocrine team if steroids changed as insulin regimen will need adjustment  Discharge planning:   - plan to rehab, likely will need basal bolus insulin final doses tbd  Outpatient f/u with Endocrinologist Dr. Lincoln. [address] [facility] [address]. Phone  Needs apt at time of discharge  -Needs optho/renal/cardiac f/u as out pt  -Make sure pt has insulin and DM supplies at time of discharge -Test BG AC/HS  -c/w Lantus 5 units QHS  -Increase Admelog 10-8-8 ac meals and administer 5 units for BG <120 if pt eats. HOLD IF PT NOT EATING.  -c/w Admelog low correction scale AC and low HS scale  -notify endocrine team if steroids changed as insulin regimen will need adjustment  Discharge planning:   - plan to rehab, likely will need basal bolus insulin final doses tbd  Outpatient f/u with Endocrinologist Dr. Lincoln. [address] [facility] [address]. Phone  Needs apt at time of discharge  -Needs optho/renal/cardiac f/u as out pt  -Make sure pt has insulin and DM supplies at time of discharge

## 2022-09-02 NOTE — PROGRESS NOTE ADULT - ATTENDING COMMENTS
Pt with ANA unlikely to recover  Cont current immunosuppression with sirolimus and prednisone  cellulitis improving - more just hyperpigmented now.   Cont coumadin and check INR for target 2-3 (afib, UE DVT)  Plan d/c to rehab next week if stable.

## 2022-09-02 NOTE — PROGRESS NOTE ADULT - PROBLEM SELECTOR PLAN 4
Anemia in the setting of renal disease. Continue with EPO 10K untis three times per week with HD. Hg below goal at this time. Monitor. Hg.     If any questions, please feel free to contact me     Steven Hyatt  Nephrology Fellow  [facility] Pager: [phone].

## 2022-09-02 NOTE — PROGRESS NOTE ADULT - ASSESSMENT
66 y/  M with Type 2 DM> unknown control due to skewed A1C 6.6% secondary to chronic anemia and s/p blood tx. On basal/bolus insulin therapy PTA. DM c/b ESRD s/p DDRT on 3/21, CVA, CAD s/p CABG, Afib on apixaban, seizure activity, HTN, HLD, h/o GI bleed in 2/2022 EGD with duodenal ulcer, discharged to [facility] rehab center after prior hospitalization, now re-admitted from [facility] for seizures c/f status epilepticus in setting of UTI. Endocrine consulted for steroid induced hyperglycemia.  Prolonged hospital course w/ ICU stay, previously intubated/extubated, previous seizures. S/p ureteral stent removal 7/12/22. 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 Remains on steroid. BG values mostly at goal w/tightly controlled fasting glucose today. BG goal (100-200mg/dl).  66 y/  M with Type 2 DM> unknown control due to skewed A1C 6.6% secondary to chronic anemia and s/p blood tx. On basal/bolus insulin therapy PTA. DM c/b ESRD s/p DDRT on 3/21, CVA, CAD s/p CABG, Afib on apixaban, seizure activity, HTN, HLD, h/o GI bleed in 2/2022 EGD with duodenal ulcer, discharged to [facility] rehab center after prior hospitalization, now re-admitted from [facility] for seizures c/f status epilepticus in setting of UTI. Endocrine consulted for steroid induced hyperglycemia.  Prolonged hospital course w/ ICU stay, previously intubated/extubated, previous seizures. S/p ureteral stent removal 7/12/22. 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 Remains on steroid. BG values mostly at goal. BG goal (100-200mg/dl).

## 2022-09-02 NOTE — PROGRESS NOTE ADULT - SUBJECTIVE AND OBJECTIVE BOX
Chief Complaint:     History:    MEDICATIONS  (STANDING):  amLODIPine   Tablet 10 milliGRAM(s) Oral daily  artificial  tears Solution 2 Drop(s) Left EYE every 6 hours  brivaracetam 50 milliGRAM(s) Oral two times a day  cephalexin 500 milliGRAM(s) Oral every 12 hours  chlorhexidine 2% Cloths 1 Application(s) Topical <User Schedule>  chlorhexidine 4% Liquid 1 Application(s) Topical <User Schedule>  doxazosin 4 milliGRAM(s) Oral <User Schedule>  epoetin cortney-epbx (RETACRIT) Injectable 60265 Unit(s) SubCutaneous <User Schedule>  finasteride 5 milliGRAM(s) Oral daily  heparin   Injectable 5000 Unit(s) SubCutaneous every 12 hours  hydrALAZINE 100 milliGRAM(s) Oral three times a day  insulin glargine Injectable (LANTUS) 5 Unit(s) SubCutaneous at bedtime  insulin lispro (ADMELOG) corrective regimen sliding scale   SubCutaneous three times a day before meals  insulin lispro (ADMELOG) corrective regimen sliding scale   SubCutaneous at bedtime  insulin lispro Injectable (ADMELOG) 8 Unit(s) SubCutaneous three times a day with meals  levothyroxine 50 MICROGram(s) Oral daily  melatonin 6 milliGRAM(s) Oral at bedtime  multivitamin 1 Tablet(s) Oral daily  nystatin    Suspension 326210 Unit(s) Oral four times a day  pantoprazole    Tablet 40 milliGRAM(s) Oral <User Schedule>  phenytoin   Capsule 200 milliGRAM(s) Oral two times a day  polyethylene glycol 3350 17 Gram(s) Oral daily  predniSONE   Tablet 10 milliGRAM(s) Oral daily  senna 2 Tablet(s) Oral at bedtime  sirolimus 7 milliGRAM(s) Oral <User Schedule>  trimethoprim   80 mG/sulfamethoxazole 400 mG 1 Tablet(s) Oral daily  valGANciclovir 450 milliGRAM(s) Oral <User Schedule>    MEDICATIONS  (PRN):  acetaminophen     Tablet .. 650 milliGRAM(s) Oral every 6 hours PRN Mild Pain (1 - 3)  diphenhydrAMINE 25 milliGRAM(s) Oral at bedtime PRN Insomnia  ondansetron Injectable 4 milliGRAM(s) IV Push once PRN Nausea and/or Vomiting  sodium chloride 0.9% lock flush 10 milliLiter(s) IV Push every 1 hour PRN Pre/post blood products, medications, blood draw, and to maintain line patency      Allergies    No Known Allergies    Intolerances      Review of Systems:  Constitutional: No fever  Eyes: No blurry vision  Neuro: No tremors  HEENT: No pain  Cardiovascular: No chest pain, palpitations  Respiratory: No SOB, no cough  GI: No nausea, vomiting, abdominal pain  : No dysuria  Skin: no rash  Psych: no depression  Endocrine: no polyuria, polydipsia  Hem/lymph: no swelling  Osteoporosis: no fractures    ALL OTHER SYSTEMS REVIEWED AND NEGATIVE    UNABLE TO OBTAIN    PHYSICAL EXAM:  VITALS: T(C): 36.6 (09-02-22 @ 09:24)  T(F): 97.8 (09-02-22 @ 09:24), Max: 98.7 (09-01-22 @ 16:43)  HR: 71 (09-02-22 @ 09:24) (71 - 81)  BP: 171/78 (09-02-22 @ 09:24) (155/58 - 175/71)  RR:  (18 - 20)  SpO2:  (95% - 100%)  Wt(kg): --  GENERAL: NAD, well-groomed, well-developed  EYES: No proptosis, no lid lag, anicteric  HEENT:  Atraumatic, Normocephalic, moist mucous membranes  THYROID: Normal size, no palpable nodules  RESPIRATORY: Clear to auscultation bilaterally; No rales, rhonchi, wheezing, or rubs  CARDIOVASCULAR: Regular rate and rhythm; No murmurs; no peripheral edema  GI: Soft, nontender, non distended, normal bowel sounds  SKIN: Dry, intact, No rashes or lesions  MUSCULOSKELETAL: Full range of motion, normal strength  NEURO: sensation intact, extraocular movements intact, no tremor, normal reflexes  PSYCH: Alert and oriented x 3, normal affect, normal mood  CUSHING'S SIGNS: no striae    POCT Blood Glucose.: 169 mg/dL (09-02-22 @ 08:10)  POCT Blood Glucose.: 130 mg/dL (09-01-22 @ 21:42)  POCT Blood Glucose.: 261 mg/dL (09-01-22 @ 17:16)  POCT Blood Glucose.: 276 mg/dL (09-01-22 @ 12:31)  POCT Blood Glucose.: 103 mg/dL (09-01-22 @ 08:29)  POCT Blood Glucose.: 121 mg/dL (08-31-22 @ 21:34)  POCT Blood Glucose.: 140 mg/dL (08-31-22 @ 17:51)  POCT Blood Glucose.: 156 mg/dL (08-31-22 @ 11:55)  POCT Blood Glucose.: 165 mg/dL (08-31-22 @ 08:17)  POCT Blood Glucose.: 239 mg/dL (08-30-22 @ 22:10)  POCT Blood Glucose.: 106 mg/dL (08-30-22 @ 17:21)  POCT Blood Glucose.: 120 mg/dL (08-30-22 @ 12:12)      09-02    132<L>  |  91<L>  |  81<H>  ----------------------------<  177<H>  4.6   |  22  |  4.78<H>    eGFR: 13<L>    Ca    8.9      09-02  Mg     2.1     09-02  Phos  5.6     09-02    TPro  6.8  /  Alb  2.7<L>  /  TBili  0.4  /  DBili  0.2  /  AST  19  /  ALT  26  /  AlkPhos  477<H>  09-02          Thyroid Function Tests:                           Chief Complaint: DM2    History: No acute events. No hypoglycemia.     MEDICATIONS  (STANDING):  amLODIPine   Tablet 10 milliGRAM(s) Oral daily  artificial  tears Solution 2 Drop(s) Left EYE every 6 hours  brivaracetam 50 milliGRAM(s) Oral two times a day  cephalexin 500 milliGRAM(s) Oral every 12 hours  chlorhexidine 2% Cloths 1 Application(s) Topical <User Schedule>  chlorhexidine 4% Liquid 1 Application(s) Topical <User Schedule>  doxazosin 4 milliGRAM(s) Oral <User Schedule>  epoetin cortney-epbx (RETACRIT) Injectable 74514 Unit(s) SubCutaneous <User Schedule>  finasteride 5 milliGRAM(s) Oral daily  heparin   Injectable 5000 Unit(s) SubCutaneous every 12 hours  hydrALAZINE 100 milliGRAM(s) Oral three times a day  insulin glargine Injectable (LANTUS) 5 Unit(s) SubCutaneous at bedtime  insulin lispro (ADMELOG) corrective regimen sliding scale   SubCutaneous three times a day before meals  insulin lispro (ADMELOG) corrective regimen sliding scale   SubCutaneous at bedtime  insulin lispro Injectable (ADMELOG) 8 Unit(s) SubCutaneous three times a day with meals  levothyroxine 50 MICROGram(s) Oral daily  melatonin 6 milliGRAM(s) Oral at bedtime  multivitamin 1 Tablet(s) Oral daily  nystatin    Suspension 954411 Unit(s) Oral four times a day  pantoprazole    Tablet 40 milliGRAM(s) Oral <User Schedule>  phenytoin   Capsule 200 milliGRAM(s) Oral two times a day  polyethylene glycol 3350 17 Gram(s) Oral daily  predniSONE   Tablet 10 milliGRAM(s) Oral daily  senna 2 Tablet(s) Oral at bedtime  sirolimus 7 milliGRAM(s) Oral <User Schedule>  trimethoprim   80 mG/sulfamethoxazole 400 mG 1 Tablet(s) Oral daily  valGANciclovir 450 milliGRAM(s) Oral <User Schedule>    MEDICATIONS  (PRN):  acetaminophen     Tablet .. 650 milliGRAM(s) Oral every 6 hours PRN Mild Pain (1 - 3)  diphenhydrAMINE 25 milliGRAM(s) Oral at bedtime PRN Insomnia  ondansetron Injectable 4 milliGRAM(s) IV Push once PRN Nausea and/or Vomiting  sodium chloride 0.9% lock flush 10 milliLiter(s) IV Push every 1 hour PRN Pre/post blood products, medications, blood draw, and to maintain line patency      Allergies    No Known Allergies    Intolerances      Review of Systems:  Constitutional: No fever  Eyes: No blurry vision  Neuro: No tremors  HEENT: No pain  Cardiovascular: No chest pain, palpitations  Respiratory: No SOB, no cough  GI: No nausea, vomiting, abdominal pain  : No dysuria  Skin: no rash  Psych: no depression  Endocrine: no polyuria, polydipsia      ALL OTHER SYSTEMS REVIEWED AND NEGATIVE        PHYSICAL EXAM:  VITALS: T(C): 36.6 (09-02-22 @ 09:24)  T(F): 97.8 (09-02-22 @ 09:24), Max: 98.7 (09-01-22 @ 16:43)  HR: 71 (09-02-22 @ 09:24) (71 - 81)  BP: 171/78 (09-02-22 @ 09:24) (155/58 - 175/71)  RR:  (18 - 20)  SpO2:  (95% - 100%)  Wt(kg): --  GENERAL: NAD, well-groomed, well-developed  EYES: No proptosis, no lid lag, anicteric  HEENT:  Atraumatic, Normocephalic, moist mucous membranes  RESPIRATORY: Clear to auscultation bilaterally  CARDIOVASCULAR: Regular rate and rhythm  GI: Soft, nontender      POCT Blood Glucose.: 169 mg/dL (09-02-22 @ 08:10)  POCT Blood Glucose.: 130 mg/dL (09-01-22 @ 21:42)  POCT Blood Glucose.: 261 mg/dL (09-01-22 @ 17:16)  POCT Blood Glucose.: 276 mg/dL (09-01-22 @ 12:31)  POCT Blood Glucose.: 103 mg/dL (09-01-22 @ 08:29)  POCT Blood Glucose.: 121 mg/dL (08-31-22 @ 21:34)  POCT Blood Glucose.: 140 mg/dL (08-31-22 @ 17:51)  POCT Blood Glucose.: 156 mg/dL (08-31-22 @ 11:55)  POCT Blood Glucose.: 165 mg/dL (08-31-22 @ 08:17)  POCT Blood Glucose.: 239 mg/dL (08-30-22 @ 22:10)  POCT Blood Glucose.: 106 mg/dL (08-30-22 @ 17:21)  POCT Blood Glucose.: 120 mg/dL (08-30-22 @ 12:12)      09-02    132<L>  |  91<L>  |  81<H>  ----------------------------<  177<H>  4.6   |  22  |  4.78<H>    eGFR: 13<L>    Ca    8.9      09-02  Mg     2.1     09-02  Phos  5.6     09-02    TPro  6.8  /  Alb  2.7<L>  /  TBili  0.4  /  DBili  0.2  /  AST  19  /  ALT  26  /  AlkPhos  477<H>  09-02

## 2022-09-02 NOTE — PROGRESS NOTE ADULT - PROBLEM SELECTOR PLAN 1
Pt. 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 No DSA.     s/p pulse dose steroids. Thymo was not given to treat the rejection as he is too frail and at increased risk for infections. Now with failed allograft function, Remains anuric and is dialysis dependent. Pt underwent IR guided HD catheter exchange on 8/8/22. Last HD done on 8/31/22. Tolerated well. Pt. underwent L IJ tunneled HD catheter placement on 8/30/22.      Labs reviewed. Pt. is still anuric. Plan for HD today. Monitor for labs. Dose meds as per HD.

## 2022-09-02 NOTE — PROGRESS NOTE ADULT - PROBLEM SELECTOR PLAN 3
Continue with LT4 50 mcg daily   - Please make sure LT4 is given on an empty stomach at least one hour apart from other meds and food to ensure med absorption. DO NOT GIVE WITH VITAMINS/ANTACIDS  - If unable to ensure proper time administration switching to IV dosing in order to ensure med absorption. Synthroid 37.5mcg IV   Monitor TFTs outpatient. TSH can be skewed when critically ill.    discussed w/pt's son  Can be reached via TEAMS/pager: [phone]   office:  [phone] (M-F 9a-5pm)               [phone] (nights/weekends)   [email or web address] password [email or web address] Continue with LT4 50 mcg daily   - Please make sure LT4 is given on an empty stomach at least one hour apart from other meds and food to ensure med absorption. DO NOT GIVE WITH VITAMINS/ANTACIDS  - If unable to ensure proper time administration switching to IV dosing in order to ensure med absorption. Synthroid 37.5mcg IV   Monitor TFTs outpatient. TSH can be skewed when critically ill.    Mendy Sierra DO

## 2022-09-03 NOTE — PROGRESS NOTE ADULT - ASSESSMENT
66M with h/o DM type II, HTN, CAD s/p CABG in 2020, Afib on Eliquis, CVA in 2019 due to Afib, Seizure d/o (last episode was on 4/8/22), h/o GI bleed in 2/2022 EGD with duodenal ulcer and ESRD on HD s/p R DDRT from DCD donor on 4/21/22 complicated by DGF requiring HD until 4/29/22 who presented with status epilepticus in setting of UTI/antibiotics and sub-therapeutic valproic acid level. Transferred to SICU on 7/25 with signs of sepsis, now with refractory seizures. Last reported seizure on 8/16. Mental status continuing to improve.     Seizure Disorder:  - Neuro/Epilepsy Teams following. Avoid Fycompa 2/2 agitation in past  - 1 min seizure with clonic movements noted on EEG 8/16. No further seizures noted.  EEG 8/16-8/18  - Continue additional anti-seizure medication Briviact 50mg BID with additional 50mg post each HD session  - On Phenytoin and Briviact  - Appreciate neuro recommendations to wean and discontinue Phenytoin and to continue Briviact.  Given prolonged hospital course and current stable regimen, would prefer to make medication adjustments as outpatient.  Discussed with Neuro  - prior MRI head 6/27 with less concerns for PRES, likely ischemic changes  - o/n CTH 8/3 with no acute findings  - Follow up with neuro regarding seizure medications recs for discharge      R DCD DDRT 4/21/22 c/b 2A ACR  - 2A ACR: s/p pulse steroids. Continue Prednisone 10mg QD  - HD/UF as per nephrology for fluid overload/anuria  - 8/14 surveillance BCx negative  - Repeated US from 8/25 did not show any renal artery stenosis, but increased resistive index, consistent with parynchyma disease was noted  - S/P removal of ureteral stent on 7/12  - 8/8 Punch biopsy wound Culture: ngtd. Negative for PTLD  - DVT ppx with SQH  - fungitel negative  - LE studies WNL    RLQ Cellulitis:  - Deferring right psoas muscle/fascia biopsy as wound is not worsening  - Continue Keflex    Immunosuppression:   - Belatacept: 6/23-8/1 discontinued  - Sirolimus 7 mg QD resume MMF at low dose 250mg bid  - Pred 10mg QD  - PPX: Nystatin, bactrim, valcyte (MW)    DM  - Labile blood glucose  - On Lantus 5U and pre-meal 10/8/8U with ISS coverage  - Fingerstick ac and qhs  - Endo recs appreciated    AFib/R IJ DVT   - Full ac transitioned to coumadin on 8/31 dose daily per INR goal >2  - rate controlled  - SQH 5000u BID  - Eliquis with ~40% less efficacy while on fosphenytoin    HTN:  - Amlodipine/Doxazosin. Increase hydralazine to 100 TID.   - off Coreg due to bradycardia    BPH:  - Continue Doxazosin/Proscar

## 2022-09-03 NOTE — PROGRESS NOTE ADULT - NUTRITIONAL ASSESSMENT
This patient has been assessed with a concern for Malnutrition and has been determined to have a diagnosis/diagnoses of Severe protein-calorie malnutrition.    This patient is being managed with:   Diet Consistent Carbohydrate w/Evening Snack-  1000mL Fluid Restriction ([id number])  No Concentrated Phosphorus  Supplement Feeding Modality:  Oral  Nepro Cans or Servings Per Day:  3       Frequency:  Daily  Entered: Aug 29 2022  4:09PM

## 2022-09-03 NOTE — PROGRESS NOTE ADULT - PROBLEM SELECTOR PLAN 4
Anemia in the setting of renal disease. Continue with EPO 10K untis three times per week with HD. Hg below goal at this time. Monitor. Hg.     If any questions, please feel free to contact me     Steven Hyatt  Nephrology Fellow  [facility] Pager: [phone].

## 2022-09-03 NOTE — PROGRESS NOTE ADULT - SUBJECTIVE AND OBJECTIVE BOX
Transplant Surgery - Multidisciplinary Rounds  --------------------------------------------------------------  DDRT  Date: 4/21/2022    Present:   Patient seen and examined with multidisciplinary team including Transplant Surgeon: Dr. Tena, Transplant Nephrologist: Dr. Nova,  Transplant [facility] unit RN during am rounds.  Disciplines not in attendance will be notified of the plan.     HPI: 66M with PMH: DM type II, HTN, CAD s/p CABG in 2020, AFib on Eliquis, CVA in 2019 due to Afib, Seizure d/o following CVA, last episode was on 4/8/22, h/o GIB in 2/2022 EGD with duodenal ulcer.  ESRD due to DM was on HD since 2019.     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  Donor was 58 , KDPI 82%, DCD, single vessels and ureter, HLA mismatch 1, 2, 2. No DSA, cPRA 0%. 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 Re-admitted in May for anemia in the setting of large perinephric hematoma s/p evacuation and repair of arterial anastomosis on 5/2/22. Intra operative biopsy showed no rejection, Creatinine ranging ~2mg/dL.    In Rehab:  He was recently dx with klebsiella UTI with Ertapenem - then switched to Levaquin    He also had parainfluenza pneumonia. Was at rehab progressing well.      Hospital course:  - 6/10: transferred from rehab facility for seizure status epilepticus. Intubated for respiratory protection and transferred to MICU.  EEG showed no seizure activity. Neuro consulted.   - 6/11: Extubated. Found to have R pleural effusion. s/p Thoracentesis 1.3L. Eliquis changed to heparin drip.  Post procedure drop in H&H. 5u PRBC, 2 u FFP.   - 6/11 evening: Transferred to SICU from MICU for further care in setting of hypoxia, significant R pleural effusion, anemia and hemodynamic instability  - 6/11 Intubated at 9pm and sedated on Precedex.  CT placed, 600ml blood drained.  1L total overnight, started pressors  - 6/11 Received Protamine for PTT >100 (was on Heparin drip started for AF), 2 u FFP and 5u PRBC total  - 6/13 s/p VATS 2.2L old blood removed; second chest tube placed  - 6/18-19: chest tubes removed  - 6/21: ?PRES vs. chronic ischemic changes on MRI, off Envarsus, switched to Belatacept 6/23 with good graft function  - 6/25: Tx to SICU for refractory seizures, improved with IV antiepileptic regimen  - 7/10: Downgraded from SICU to floor.   - 7/11: OR-->URETERAL STENT REMOVED  - 7/15: ESBL Kleb UTI (50-90K), completed Ertapenem x 3 Days  - 7/25: Tx to SICU for Sepsis/ elevated LFTS  - 7/27: Shiley placed for HD  - 7/28: ongoing fevers -> started Ertapenem/Fluc  - 7/29: HD restarted + 1U PRBC.  IR bx with 2A rejection, started pulse steroids. LFTs have improved  - 8/4: refractory seizures  - 8/8: s/p derm bx with no acute finding  - 8/8: s/p permacath exchange  - 8/16: One seizure on EEG medications changed added brivaracetam  - 8/22: HD, followed by U/S of Transplant kidney  - 8/31 Eliquis changed to coumadin (due to 40% less efficacy while on phynetoin)    Interval Events:   - No acute events overnight.   - Remains anuric on HD,per renal  -Was given warfarin 5 mg (3nd dose yesterday)  -ambulated w/ 2 person assist using walker in hallway yesterday  -Seen by endocrinology note reviewed  -Continue to have nightly restless      Immunosuppression:   Induction: Simulect                                               Maintenance immunosuppression: Sirolimus 7 mg QD, prednisone 10 , MMF held  Ongoing monitoring for signs of rejection.    Potential Discharge date: pending clinical improvement    Education:  Medications    Plan of care:  See Below         MEDICATIONS  (STANDING):  amLODIPine   Tablet 10 milliGRAM(s) Oral daily  artificial  tears Solution 2 Drop(s) Left EYE every 6 hours  brivaracetam 50 milliGRAM(s) Oral two times a day  cephalexin 500 milliGRAM(s) Oral every 12 hours  chlorhexidine 2% Cloths 1 Application(s) Topical <User Schedule>  chlorhexidine 4% Liquid 1 Application(s) Topical <User Schedule>  doxazosin 4 milliGRAM(s) Oral <User Schedule>  epoetin cortney-epbx (RETACRIT) Injectable 38620 Unit(s) SubCutaneous <User Schedule>  finasteride 5 milliGRAM(s) Oral daily  heparin   Injectable 5000 Unit(s) SubCutaneous every 12 hours  hydrALAZINE 100 milliGRAM(s) Oral three times a day  insulin glargine Injectable (LANTUS) 5 Unit(s) SubCutaneous at bedtime  insulin lispro (ADMELOG) corrective regimen sliding scale   SubCutaneous three times a day before meals  insulin lispro (ADMELOG) corrective regimen sliding scale   SubCutaneous at bedtime  insulin lispro Injectable (ADMELOG) 10 Unit(s) SubCutaneous before breakfast  insulin lispro Injectable (ADMELOG) 8 Unit(s) SubCutaneous before lunch  insulin lispro Injectable (ADMELOG) 8 Unit(s) SubCutaneous before dinner  levothyroxine 50 MICROGram(s) Oral daily  melatonin 6 milliGRAM(s) Oral at bedtime  multivitamin 1 Tablet(s) Oral daily  mycophenolate mofetil 250 milliGRAM(s) Oral two times a day  nystatin    Suspension 957060 Unit(s) Oral four times a day  pantoprazole    Tablet 40 milliGRAM(s) Oral <User Schedule>  phenytoin   Capsule 200 milliGRAM(s) Oral two times a day  polyethylene glycol 3350 17 Gram(s) Oral daily  predniSONE   Tablet 10 milliGRAM(s) Oral daily  senna 2 Tablet(s) Oral at bedtime  sirolimus 7 milliGRAM(s) Oral <User Schedule>  trimethoprim   80 mG/sulfamethoxazole 400 mG 1 Tablet(s) Oral daily  valGANciclovir 450 milliGRAM(s) Oral <User Schedule>  warfarin 7 milliGRAM(s) Oral at bedtime    MEDICATIONS  (PRN):  acetaminophen     Tablet .. 650 milliGRAM(s) Oral every 6 hours PRN Mild Pain (1 - 3)  diphenhydrAMINE 25 milliGRAM(s) Oral at bedtime PRN Insomnia  ondansetron Injectable 4 milliGRAM(s) IV Push once PRN Nausea and/or Vomiting  sodium chloride 0.9% lock flush 10 milliLiter(s) IV Push every 1 hour PRN Pre/post blood products, medications, blood draw, and to maintain line patency      PAST MEDICAL & SURGICAL HISTORY:  Hypertension      Diabetes      Dyslipidemia      CAD (Coronary Artery Disease)  with Stents in 06/2009 and 6/2019, s/p off-pump C3L on 8/13/20      Hypothyroidism      CVA (cerebral vascular accident)  12/13/19 with residual bilateral weakness      Anemia      PEG (percutaneous endoscopic gastrostomy) status  removed July 2020      Intubation of airway performed without difficulty  Dec 2019  CVA c/b status epilepticus requiring intubation and PEG in 12/2019-      ESRD on dialysis  M/W/F      Seizures  after CVA, last seizure was last week 4/07/22      History of insertion of stent into coronary artery bypass graft  2009 and 2019      S/P CABG x 3  off pump C3L on 8/13/20          Vital Signs Last 24 Hrs  T(C): 36.7 (03 Sep 2022 08:44), Max: 37.2 (02 Sep 2022 21:00)  T(F): 98.1 (03 Sep 2022 08:44), Max: 98.9 (02 Sep 2022 21:00)  HR: 88 (03 Sep 2022 08:44) (73 - 90)  BP: 166/77 (03 Sep 2022 08:44) (157/65 - 185/76)  BP(mean): --  RR: 20 (03 Sep 2022 08:44) (18 - 20)  SpO2: 93% (03 Sep 2022 08:44) (93% - 100%)    Parameters below as of 03 Sep 2022 08:44  Patient On (Oxygen Delivery Method): room air        I&O's Summary    02 Sep 2022 07:01  -  03 Sep 2022 07:00  --------------------------------------------------------  IN: 180 mL / OUT: 2000 mL / NET: -1820 mL                              8.3    5.54  )-----------( 438      ( 03 Sep 2022 06:53 )             27.0     09-03    131<L>  |  91<L>  |  52<H>  ----------------------------<  106<H>  4.2   |  25  |  3.51<H>    Ca    8.8      03 Sep 2022 06:51  Phos  3.9     09-03  Mg     1.9     09-03    TPro  6.5  /  Alb  2.8<L>  /  TBili  0.3  /  DBili  0.1  /  AST  23  /  ALT  25  /  AlkPhos  454<H>  09-03                    Review of systems  Gen: No weight changes, fatigue, fevers/chills, weakness  Skin: No rashes  Head/Eyes/Ears/Mouth: No headache; Normal hearing; Normal vision w/o blurriness; No sinus pain/discomfort, sore throat  Respiratory: No dyspnea, cough, wheezing, hemoptysis  CV: No chest pain, PND, orthopnea  GI: denies abdominal pain/flank pain; denies diarrhea, constipation, nausea, vomiting, melena, hematochezia  : No increased frequency, dysuria, hematuria, nocturia  MSK: No joint pain/swelling; no back pain; no edema  Neuro: No dizziness/lightheadedness, weakness, seizures, numbness, tingling  Heme: No easy bruising or bleeding  Endo: No heat/cold intolerance  Psych: No significant nervousness, anxiety, stress, depression   All other systems were reviewed and are negative, except as noted.      PHYSICAL EXAM:  Constitutional: Well developed / well nourished  Eyes: Anicteric  Respiratory: nonlabored   Cardiovascular: normotensive, regular rate   Gastrointestinal: Soft, non distended, No tenderness at the incision site and Rt flank, incision well healed   Extremities: SCD's in place and working bilaterally  Neurological: awake, alert, answering questions appropriately   Skin: RLQ/R flank erythema/firm to touch  Musculoskeletal: Moving all extremities  Psychiatric: Responsive

## 2022-09-03 NOTE — PROGRESS NOTE ADULT - ASSESSMENT
66 yr old man with h/o DM, HTN, CAD s/p CABG, Afib on Eliquis, CVA in 2019, Seizure d/o s/p DDRT from DCD donor on 4/21/22.   Complicated post transplant course - initial DGF eventually recovered. Klebsiella UTI/Sepsis rx with antibiotics, status epilepticus, Envarsus discontinued for neurotoxicity/seizures and he was started on belatacept -> complicated by ANA due to rejection Grade 2a treated with pulse steroids, Currently HD dependent.   HD done yesterday, no ultrafiltration.     S/p DDRT from DCD donor on 4/21/22    Poor graft function due to Grade 2a rejection (biopsy 7/29) , c4d negative, No DSA.    S/p pulse solumedrol completed 8/3. Thymo not given due to frailty    Remains dialysis dependent. Oliguric. Transplant US on 8/25 homogenous flow, no hydro, 1.9 x 1.5 cm collection, no significant TRAS.    Last HD was 9/2. Volume status and electrolytes fair. No need for HD today.     Immunosuppression   - MMF on hold due to recurrent infections, Envarsus d/darren for seizures and poor mental status and changed to Belatacept. Currently on sirolimus and prednisone   - Continue sirolimus 7mg daily   - Continue prednisone 10mg po daily   - Resume Cellcept 250mg po bid     Infection prophylaxis - Nystatin, Bactrim, Valcyte     Recurrent ESBL Klebsiella UTI - 7/15 and 7/25. Completed ertapenem on 8/3     Anemia s/p PRBC 1 unit 8/26. Continue epoetin 10,000 units sq weekly   Hypertension - Continue Amlodipine 10, Hydralazine 100mg TID and Doxazosin 4.   Diabetes - Controlled, Continue Lantus 5 and Admelog pre meal and sliding scale   Seizure d/o - on Briviact and phenytoin. Neurology following.   {[id number]}A.fib - Eliquis d/darren due to drug interactions. On Coumadin, INR sub therapeutic, increase to 7mg po tonight.

## 2022-09-03 NOTE — PROGRESS NOTE ADULT - SUBJECTIVE AND OBJECTIVE BOX
[facility] DIVISION OF KIDNEY DISEASES AND HYPERTENSION -- FOLLOW UP NOTE  --------------------------------------------------------------------------------  Authored by: Ozzie Nova   Cell # [phone]     CHAD DUNBAR was seen and examined at bedside.   Patient is a 66y old  Male who presents with a chief complaint of Status Epilepticus (09-03-22)      24 hour events/subjective: No major events. Restless at night. Tolerating PO. Walked with PT yesterday.       Standing Inpatient Medications  amLODIPine   Tablet 10 milliGRAM(s) Oral daily  artificial  tears Solution 2 Drop(s) Left EYE every 6 hours  brivaracetam 50 milliGRAM(s) Oral two times a day  cephalexin 500 milliGRAM(s) Oral every 12 hours  doxazosin 4 milliGRAM(s) Oral <User Schedule>  epoetin cortney-epbx (RETACRIT) Injectable 68544 Unit(s) SubCutaneous <User Schedule>  finasteride 5 milliGRAM(s) Oral daily  heparin   Injectable 5000 Unit(s) SubCutaneous every 12 hours  hydrALAZINE 100 milliGRAM(s) Oral three times a day  insulin glargine Injectable (LANTUS) 5 Unit(s) SubCutaneous at bedtime  insulin lispro (ADMELOG) corrective regimen sliding scale   SubCutaneous three times a day before meals  insulin lispro (ADMELOG) corrective regimen sliding scale   SubCutaneous at bedtime  insulin lispro Injectable (ADMELOG) 10 Unit(s) SubCutaneous before breakfast  insulin lispro Injectable (ADMELOG) 8 Unit(s) SubCutaneous before lunch  insulin lispro Injectable (ADMELOG) 8 Unit(s) SubCutaneous before dinner  levothyroxine 50 MICROGram(s) Oral daily  melatonin 6 milliGRAM(s) Oral at bedtime  multivitamin 1 Tablet(s) Oral daily  nystatin    Suspension 649465 Unit(s) Oral four times a day  pantoprazole    Tablet 40 milliGRAM(s) Oral <User Schedule>  phenytoin   Capsule 200 milliGRAM(s) Oral two times a day  polyethylene glycol 3350 17 Gram(s) Oral daily  predniSONE   Tablet 10 milliGRAM(s) Oral daily  senna 2 Tablet(s) Oral at bedtime  sirolimus 7 milliGRAM(s) Oral <User Schedule>  trimethoprim   80 mG/sulfamethoxazole 400 mG 1 Tablet(s) Oral daily  valGANciclovir 450 milliGRAM(s) Oral <User Schedule>  warfarin 5 milliGRAM(s) Oral at bedtime    PRN Inpatient Medications  acetaminophen     Tablet .. 650 milliGRAM(s) Oral every 6 hours PRN  diphenhydrAMINE 25 milliGRAM(s) Oral at bedtime PRN  ondansetron Injectable 4 milliGRAM(s) IV Push once PRN      VITALS/PHYSICAL EXAM  --------------------------------------------------------------------------------  T(C): 36.7 (09-03-22 @ 08:44), Max: 37.2 (09-02-22 @ 21:00)  HR: 88 (09-03-22 @ 08:44) (73 - 90)  BP: 166/77 (09-03-22 @ 08:44) (157/65 - 185/76)  RR: 20 (09-03-22 @ 08:44) (18 - 20)  SpO2: 93% (09-03-22 @ 08:44) (93% - 100%)      09-02-22 @ 07:01  -  09-03-22 @ 07:00  --------------------------------------------------------  IN: 180 mL / OUT: 2000 mL / NET: -1820 mL      Physical Exam:  Awake and alert   Thin, chronically ill appearing , no signs of acute distress  Lungs with bi basilar crackles   Left dialysis catheter tunneled   Abdomen soft, abdominal wall induration appears better, no tenderness  No LE edema       LABS/STUDIES  --------------------------------------------------------------------------------              8.3    5.54  >-----------<  438      [09-03-22 @ 06:53]              27.0     131  |  91  |  52  ----------------------------<  106      [09-03-22 @ 06:51]  4.2   |  25  |  3.51        Ca     8.8     [09-03-22 @ 06:51]      Mg     1.9     [09-03-22 @ 06:51]      Phos  3.9     [09-03-22 @ 06:51]    TPro  6.5  /  Alb  2.8  /  TBili  0.3  /  DBili  0.1  /  AST  23  /  ALT  25  /  AlkPhos  454  [09-03-22 @ 06:51]    PT/INR: PT 15.4 , INR 1.32       [09-03-22 @ 06:56]  PTT: 32.1       [09-03-22 @ 06:56]      Creatinine Trend:  SCr 3.51 [09-03 @ 06:51]  SCr 4.78 [09-02 @ 06:53]  SCr 3.84 [09-01 @ 06:54]  SCr 5.10 [08-31 @ 06:35]  SCr 3.99 [08-30 @ 06:17]      Sirolimus (Rapamycin) Level, Serum: 3.5 ng/mL (09-02 @ 06:47)  Sirolimus (Rapamycin) Level, Serum: 4.3 ng/mL (09-01 @ 06:54)  Sirolimus (Rapamycin) Level, Serum: 4.6 ng/mL (08-31 @ 06:35)  Sirolimus (Rapamycin) Level, Serum: 3.1 ng/mL (08-30 @ 06:17)      CAPILLARY BLOOD GLUCOSE  POCT Blood Glucose.: 177 mg/dL (03 Sep 2022 12:13)  POCT Blood Glucose.: 128 mg/dL (03 Sep 2022 08:06)  POCT Blood Glucose.: 101 mg/dL (02 Sep 2022 21:45)  POCT Blood Glucose.: 125 mg/dL (02 Sep 2022 17:34)      Urinalysis - [08-16-22 @ 18:49]      Color Yellow / Appearance Slightly Turbid / SG 1.018 / pH 8.5      Gluc 200 mg/dL / Ketone Negative  / Bili Negative / Urobili Negative       Blood Large / Protein >600 / Leuk Est Moderate / Nitrite Positive      RBC 13 / WBC 40 / Hyaline 7 / Gran  / Sq Epi  / Non Sq Epi 4 / Bacteria Moderate      Iron 17, TIBC 171, %sat 10      [05-02-22 @ 13:03]  Ferritin 6336      [07-27-22 @ 13:00]  PTH -- (Ca 9.2)      [02-05-22 @ 10:13]   294  HbA1c 6.0      [02-21-20 @ 08:53]  TSH 4.69      [07-06-22 @ 18:36]  Lipid: chol --, , HDL --, LDL --      [07-27-22 @ 13:00]

## 2022-09-03 NOTE — PROGRESS NOTE ADULT - SUBJECTIVE AND OBJECTIVE BOX
[facility]   MD NINA MASON PA MIJUNG SHIN NP     TEL:  OFFICE: [phone]    From 5pm-7am Answering Service [phone]    -- RENAL FOLLOW UP NOTE ---Date of Service 09-03-22 @ 15:13    Patient is a 66y old  Male who presents with a chief complaint of Status Epilepticus (03 Sep 2022 13:23)      Patient seen and examined at bedside. No chest pain/sob    VITALS:  T(F): 98.1 (09-03-22 @ 13:51), Max: 98.9 (09-02-22 @ 21:00)  HR: 75 (09-03-22 @ 13:51)  BP: 152/67 (09-03-22 @ 13:51)  RR: 20 (09-03-22 @ 13:51)  SpO2: 97% (09-03-22 @ 13:51)  Wt(kg): --    09-02 @ 07:01  -  09-03 @ 07:00  --------------------------------------------------------  IN: 180 mL / OUT: 2000 mL / NET: -1820 mL          PHYSICAL EXAM:  Constitutional: NAD  Neck: No JVD  Respiratory: CTAB, no wheezes, rales or rhonchi  Cardiovascular: S1, S2, RRR  Gastrointestinal: BS+, soft, NT/ND  Extremities: No peripheral edema  vascular access: cvc HD cath    Hospital Medications:   MEDICATIONS  (STANDING):  amLODIPine   Tablet 10 milliGRAM(s) Oral daily  artificial  tears Solution 2 Drop(s) Left EYE every 6 hours  brivaracetam 50 milliGRAM(s) Oral two times a day  cephalexin 500 milliGRAM(s) Oral every 12 hours  chlorhexidine 2% Cloths 1 Application(s) Topical <User Schedule>  chlorhexidine 4% Liquid 1 Application(s) Topical <User Schedule>  doxazosin 4 milliGRAM(s) Oral <User Schedule>  epoetin cortney-epbx (RETACRIT) Injectable 05424 Unit(s) SubCutaneous <User Schedule>  finasteride 5 milliGRAM(s) Oral daily  heparin   Injectable 5000 Unit(s) SubCutaneous every 12 hours  hydrALAZINE 100 milliGRAM(s) Oral three times a day  insulin glargine Injectable (LANTUS) 5 Unit(s) SubCutaneous at bedtime  insulin lispro (ADMELOG) corrective regimen sliding scale   SubCutaneous three times a day before meals  insulin lispro (ADMELOG) corrective regimen sliding scale   SubCutaneous at bedtime  insulin lispro Injectable (ADMELOG) 10 Unit(s) SubCutaneous before breakfast  insulin lispro Injectable (ADMELOG) 8 Unit(s) SubCutaneous before lunch  insulin lispro Injectable (ADMELOG) 8 Unit(s) SubCutaneous before dinner  levothyroxine 50 MICROGram(s) Oral daily  melatonin 6 milliGRAM(s) Oral at bedtime  multivitamin 1 Tablet(s) Oral daily  mycophenolate mofetil 250 milliGRAM(s) Oral two times a day  nystatin    Suspension 611170 Unit(s) Oral four times a day  pantoprazole    Tablet 40 milliGRAM(s) Oral <User Schedule>  phenytoin   Capsule 200 milliGRAM(s) Oral two times a day  polyethylene glycol 3350 17 Gram(s) Oral daily  predniSONE   Tablet 10 milliGRAM(s) Oral daily  senna 2 Tablet(s) Oral at bedtime  sirolimus 7 milliGRAM(s) Oral <User Schedule>  trimethoprim   80 mG/sulfamethoxazole 400 mG 1 Tablet(s) Oral daily  valGANciclovir 450 milliGRAM(s) Oral <User Schedule>  warfarin 7 milliGRAM(s) Oral at bedtime      LABS:  09-03    131<L>  |  91<L>  |  52<H>  ----------------------------<  106<H>  4.2   |  25  |  3.51<H>    Ca    8.8      03 Sep 2022 06:51  Phos  3.9     09-03  Mg     1.9     09-03    TPro  6.5  /  Alb  2.8<L>  /  TBili  0.3  /  DBili  0.1  /  AST  23  /  ALT  25  /  AlkPhos  454<H>  09-03    Creatinine Trend: 3.51 <--, 4.78 <--, 3.84 <--, 5.10 <--, 3.99 <--, 5.65 <--, 4.65 <--    Albumin, Serum: 2.8 g/dL (09-03 @ 06:51)  Phosphorus Level, Serum: 3.9 mg/dL (09-03 @ 06:51)                              8.3    5.54  )-----------( 438      ( 03 Sep 2022 06:53 )             27.0     Urine Studies:  Urinalysis - [08-16-22 @ 18:49]      Color Yellow / Appearance Slightly Turbid / SG 1.018 / pH 8.5      Gluc 200 mg/dL / Ketone Negative  / Bili Negative / Urobili Negative       Blood Large / Protein >600 / Leuk Est Moderate / Nitrite Positive      RBC 13 / WBC 40 / Hyaline 7 / Gran  / Sq Epi  / Non Sq Epi 4 / Bacteria Moderate      Iron 17, TIBC 171, %sat 10      [05-02-22 @ 13:03]  Ferritin 6336      [07-27-22 @ 13:00]  PTH -- (Ca 9.2)      [02-05-22 @ 10:13]   294  HbA1c 6.0      [02-21-20 @ 08:53]  TSH 4.69      [07-06-22 @ 18:36]  Lipid: chol --, , HDL --, LDL --      [07-27-22 @ 13:00]        RADIOLOGY & ADDITIONAL STUDIES:

## 2022-09-03 NOTE — PROGRESS NOTE ADULT - ASSESSMENT
66 yr old Male with PMHx ESRD s/p permacath removal 5/10/22 s/p Rt DDRT 4/21/22,  CVA (2019), Afib on apixaban, seizure activity, CAD s/p stents, CABG (2020), HTN, HLD, DM on insulin, gastric/duodenal ulcer, recent hospitalization 4/28/22 -5/10/22 with weakness/anemia found to have perinephric hematoma requiring evacuation and repair of bleeding arterial anastomosis. Curently being treated for UTI with Levaquin Today after developing multiple sz episodes refractory to 5 mg versed IM (EMS) and 2 mg ativan IV in E.D. concerning for status epilepticus requiring intubation for hypoxic respiratory  failure. MICU Consult was called for hypoxic respiratory failure secondary to status epilepticus.  Nephrology consulted for renal failure.     A/P  DCD KTX on 04/21/22  Course complicated by DGF, was on HD until 4/29/22. Readmitted in May for anemia in the setting of large perinephric hematoma s/p evacuation and repair of arterial anastomosis on 5/02/22. Intra operative biopsy showed no rejection.  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   Now with failed allograft function, Remains dialysis dependent.   continue Immunosuppression per transplant team  s/p Perma cath placement 08/30/22   last HD 09/02/22 - RRT per transplant team   transplant team on board   monitor BMP, U/O     Immunosuppression per transplant team  - Belatacept: 6/23-8/1/2022 discontinued  - on Sirolimus since 8/1/2022 based on level,  Prednisone 10 mg QD 8/16  Cellcept 250 BID on hold   - PPX:  Nystatin, bactrim, valcyte (MWF)      HTN   BP manage by transplant team    monitor BP closely      66 yr old Male with PMHx ESRD s/p permacath removal 5/10/22 s/p Rt DDRT 4/21/22,  CVA (2019), Afib on apixaban, seizure activity, CAD s/p stents, CABG (2020), HTN, HLD, DM on insulin, gastric/duodenal ulcer, recent hospitalization 4/28/22 -5/10/22 with weakness/anemia found to have perinephric hematoma requiring evacuation and repair of bleeding arterial anastomosis. Curently being treated for UTI with Levaquin Today after developing multiple sz episodes refractory to 5 mg versed IM (EMS) and 2 mg ativan IV in E.D. concerning for status epilepticus requiring intubation for hypoxic respiratory  failure. MICU Consult was called for hypoxic respiratory failure secondary to status epilepticus.  Nephrology consulted for renal failure.     A/P  DCD KTX on 04/21/22  Course complicated by DGF, was on HD until 4/29/22. Readmitted in May for anemia in the setting of large perinephric hematoma s/p evacuation and repair of arterial anastomosis on 5/02/22. Intra operative biopsy showed no rejection.  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   Now with failed allograft function, Remains dialysis dependent.   continue Immunosuppression per transplant team  s/p Perma cath placement 08/30/22   last HD 09/02/22 - RRT per transplant team   transplant team on board   monitor BMP, U/O     Immunosuppression per transplant team  - Belatacept: 6/23-8/1/2022 discontinued  - on Sirolimus since 8/1/2022 based on level,  Prednisone 10 mg QD 8/16  Cellcept 250 BID on hold   - PPX:  Nystatin, bactrim, valcyte (MWF)      HTN   BP suboptimal  Consider to add carvedilol 3.125mg BID   monitor BP closely

## 2022-09-04 NOTE — PROGRESS NOTE ADULT - SUBJECTIVE AND OBJECTIVE BOX
[facility] [place] [facility]   MD NINA MASON PA MIJUNG SHIN NP     TEL:  OFFICE: [phone]    From 5pm-7am Answering Service [phone]    -- RENAL FOLLOW UP NOTE ---Date of Service 09-04-22 @ 11:45    Patient is a 66y old  Male who presents with a chief complaint of Status Epilepticus (04 Sep 2022 08:49)      Patient seen and examined at bedside. No chest pain/sob    VITALS:  T(F): 98.2 (09-04-22 @ 09:00), Max: 98.6 (09-04-22 @ 05:00)  HR: 80 (09-04-22 @ 09:00)  BP: 152/78 (09-04-22 @ 09:00)  RR: 18 (09-04-22 @ 09:00)  SpO2: 95% (09-04-22 @ 09:00)  Wt(kg): --    09-03 @ 07:01  -  09-04 @ 07:00  --------------------------------------------------------  IN: 960 mL / OUT: 60 mL / NET: 900 mL          PHYSICAL EXAM:  Constitutional: NAD  Neck: No JVD  Respiratory: CTAB, no wheezes, rales or rhonchi  Cardiovascular: S1, S2, RRR  Gastrointestinal: BS+, soft, NT/ND  Extremities: No peripheral edema  Neurology: no focal, no tremors  Psychiatry: AOx3, normal affect  skin: warm and dry  vascular access: [facility] Medications:   MEDICATIONS  (STANDING):  amLODIPine   Tablet 10 milliGRAM(s) Oral daily  artificial  tears Solution 2 Drop(s) Left EYE every 6 hours  brivaracetam 50 milliGRAM(s) Oral two times a day  chlorhexidine 2% Cloths 1 Application(s) Topical <User Schedule>  chlorhexidine 4% Liquid 1 Application(s) Topical <User Schedule>  doxazosin 4 milliGRAM(s) Oral <User Schedule>  epoetin cortney-epbx (RETACRIT) Injectable 24920 Unit(s) SubCutaneous <User Schedule>  finasteride 5 milliGRAM(s) Oral daily  furosemide   Injectable 80 milliGRAM(s) IV Push once  heparin   Injectable 5000 Unit(s) SubCutaneous every 12 hours  hydrALAZINE 100 milliGRAM(s) Oral three times a day  insulin glargine Injectable (LANTUS) 5 Unit(s) SubCutaneous at bedtime  insulin lispro (ADMELOG) corrective regimen sliding scale   SubCutaneous three times a day before meals  insulin lispro (ADMELOG) corrective regimen sliding scale   SubCutaneous at bedtime  insulin lispro Injectable (ADMELOG) 10 Unit(s) SubCutaneous before breakfast  insulin lispro Injectable (ADMELOG) 8 Unit(s) SubCutaneous before lunch  insulin lispro Injectable (ADMELOG) 8 Unit(s) SubCutaneous before dinner  levothyroxine 50 MICROGram(s) Oral daily  melatonin 6 milliGRAM(s) Oral at bedtime  multivitamin 1 Tablet(s) Oral daily  mycophenolate mofetil 250 milliGRAM(s) Oral two times a day  nystatin    Suspension 052383 Unit(s) Oral four times a day  pantoprazole    Tablet 40 milliGRAM(s) Oral <User Schedule>  phenytoin   Capsule 200 milliGRAM(s) Oral two times a day  polyethylene glycol 3350 17 Gram(s) Oral daily  predniSONE   Tablet 10 milliGRAM(s) Oral daily  senna 2 Tablet(s) Oral at bedtime  sirolimus 7 milliGRAM(s) Oral <User Schedule>  trimethoprim   80 mG/sulfamethoxazole 400 mG 1 Tablet(s) Oral daily  valGANciclovir 450 milliGRAM(s) Oral <User Schedule>  warfarin 10 milliGRAM(s) Oral once      LABS:  09-04    129<L>  |  90<L>  |  76<H>  ----------------------------<  102<H>  4.9   |  22  |  4.70<H>    Ca    8.9      04 Sep 2022 06:07  Phos  4.8     09-04  Mg     2.2     09-04    TPro  6.8  /  Alb  2.9<L>  /  TBili  0.3  /  DBili  0.2  /  AST  20  /  ALT  22  /  AlkPhos  434<H>  09-04    Creatinine Trend: 4.70 <--, 3.51 <--, 4.78 <--, 3.84 <--, 5.10 <--, 3.99 <--, 5.65 <--    Albumin, Serum: 2.9 g/dL (09-04 @ 06:07)  Phosphorus Level, Serum: 4.8 mg/dL (09-04 @ 06:07)                              8.5    5.77  )-----------( 468      ( 04 Sep 2022 06:05 )             26.7     Urine Studies:  Urinalysis - [08-16-22 @ 18:49]      Color Yellow / Appearance Slightly Turbid / SG 1.018 / pH 8.5      Gluc 200 mg/dL / Ketone Negative  / Bili Negative / Urobili Negative       Blood Large / Protein >600 / Leuk Est Moderate / Nitrite Positive      RBC 13 / WBC 40 / Hyaline 7 / Gran  / Sq Epi  / Non Sq Epi 4 / Bacteria Moderate      Iron 17, TIBC 171, %sat 10      [05-02-22 @ 13:03]  Ferritin 6336      [07-27-22 @ 13:00]  PTH -- (Ca 9.2)      [02-05-22 @ 10:13]   294  HbA1c 6.0      [02-21-20 @ 08:53]  TSH 4.69      [07-06-22 @ 18:36]  Lipid: chol --, , HDL --, LDL --      [07-27-22 @ 13:00]        RADIOLOGY & ADDITIONAL STUDIES:

## 2022-09-04 NOTE — PROGRESS NOTE ADULT - ASSESSMENT
66 y/  M with Type 2 DM> unknown control due to skewed A1C 6.6% secondary to chronic anemia and s/p blood tx. On basal/bolus insulin therapy PTA. DM c/b ESRD s/p DDRT on 3/21, CVA, CAD s/p CABG, Afib on apixaban, seizure activity, HTN, HLD, h/o GI bleed in 2/2022 EGD with duodenal ulcer, discharged to [facility] rehab center after prior hospitalization, now re-admitted from [facility] for seizures c/f status epilepticus in setting of UTI. Endocrine consulted for steroid induced hyperglycemia.  Prolonged hospital course w/ ICU stay, previously intubated/extubated, previous seizures. S/p ureteral stent removal 7/12/22. 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 Remains on steroid. BG values mostly at goal. BG goal (100-200mg/dl).     Type 2 diabetes mellitus with hypoglycemia, with long-term current use of insulin.   ·  Plan: -Test BG AC/HS  -c/w Lantus 5 units QHS  -c/w  Admelog 10-8-8 ac meals and administer 5 units for BG <120 if pt eats. HOLD IF PT NOT EATING.  -c/w Admelog low correction scale AC and low HS scale  -notify endocrine team if steroids changed as insulin regimen will need adjustment  Discharge planning:   - plan to rehab, likely will need basal bolus insulin final doses tbd  Outpatient f/u with Endocrinologist Dr. Lincoln. [address]. Phone  Needs apt at time of discharge  -Needs optho/renal/cardiac f/u as out pt  -Make sure pt has insulin and DM supplies at time of discharge.     Problem/Plan - 2:  ·  Problem: HTN (hypertension).   ·  Plan: BP goal 130/80  - Manage per primary team.     Problem/Plan - 3:  ·  Problem: Hypothyroidism.   ·  Plan: Continue with LT4 50 mcg daily   - Please make sure LT4 is given on an empty stomach at least one hour apart from other meds and food to ensure med absorption. DO NOT GIVE WITH VITAMINS/ANTACIDS  - If unable to ensure proper time administration switching to IV dosing in order to ensure med absorption. Synthroid 37.5mcg IV   Monitor TFTs outpatient. TSH can be skewed when critically ill.      Discussed with patient   Contact via [employer] Teams during business hours  On evenings and weekends, please call [phone] or page endocrine fellow on call.   Email: [email or web address]   Please note that this patient may be followed by different provider tomorrow.    greater than 50% of the encounter was spent counseling and/or coordination of care.  26 minutes spent on total encounter; The necessity of the time spent during the encounter on this date of service was due to development of plan of care/coordination of care/glycemic control through review of labs, blood glucose values and vital signs.

## 2022-09-04 NOTE — PROGRESS NOTE ADULT - ASSESSMENT
66 yr old man with h/o DM, HTN, CAD s/p CABG, Afib on Eliquis, CVA in 2019, Seizure d/o s/p DDRT from DCD donor on 4/21/22.   Complicated post transplant course - initial DGF eventually recovered. Klebsiella UTI/Sepsis rx with antibiotics, status epilepticus, Envarsus discontinued for neurotoxicity/seizures and he was started on belatacept -> complicated by ANA due to rejection Grade 2a treated with pulse steroids, Currently HD dependent.     S/p DDRT from DCD donor on 4/21/22    Poor graft function due to Grade 2a rejection (biopsy 7/29) , c4d negative, No DSA.    S/p pulse solumedrol completed 8/3. Thymo not given due to frailty    Remains dialysis dependent. Oliguric. Transplant US on 8/25 homogenous flow, no hydro, 1.9 x 1.5 cm collection, no significant TRAS.    HD done 9/2/22,Next HD planned tomorrow. Volume status and electrolytes fair. No need for HD today.    Seems to be making more urine - will give lasix 80mg iv     Immunosuppression   - MMF on hold due to recurrent infections, Envarsus d/darren for seizures and poor mental status and changed to Belatacept. Currently on sirolimus and prednisone   - Continue sirolimus 7mg daily   - Continue prednisone 10mg po daily   -Cellcept 250mg po bid (resumed 9/3/22)    Infection prophylaxis - Nystatin, Bactrim, Valcyte     Recurrent ESBL Klebsiella UTI - 7/15 and 7/25. Completed ertapenem on 8/3     Anemia s/p PRBC 1 unit 8/26. Continue epoetin 10,000 units sq TIW with HD   Hypertension - Continue Amlodipine 10, Hydralazine 100mg TID and Doxazosin 4mg bid. -180 systolic. Add Coreg 12.5mg po bid.   Diabetes - Controlled, Continue Lantus 5 and Admelog pre meal and sliding scale   Seizure d/o - on Briviact and phenytoin. Neurology following.   {[id number]}A.fib - Eliquis d/darren due to drug interactions. On Coumadin, INR sub therapeutic, increase to 10mg po tonight.

## 2022-09-04 NOTE — PROGRESS NOTE ADULT - NUTRITIONAL ASSESSMENT
This patient has been assessed with a concern for Malnutrition and has been determined to have a diagnosis/diagnoses of Severe protein-calorie malnutrition.    This patient is being managed with:   Diet Consistent Carbohydrate w/Evening Snack-  1000mL Fluid Restriction ([id number])  No Concentrated Phosphorus  Supplement Feeding Modality:  Oral  Nepro Cans or Servings Per Day:  3       Frequency:  Daily  Entered: Aug 29 2022  4:09PM

## 2022-09-04 NOTE — PROGRESS NOTE ADULT - ASSESSMENT
66M with h/o DM type II, HTN, CAD s/p CABG in 2020, Afib on Eliquis, CVA in 2019 due to Afib, Seizure d/o (last episode was on 4/8/22), h/o GI bleed in 2/2022 EGD with duodenal ulcer and ESRD on HD s/p R DDRT from DCD donor on 4/21/22 complicated by DGF requiring HD until 4/29/22 who presented with status epilepticus in setting of UTI/antibiotics and sub-therapeutic valproic acid level. Transferred to SICU on 7/25 with signs of sepsis, now with refractory seizures. Last reported seizure on 8/16. Mental status continuing to improve.     Seizure Disorder:  - Neuro/Epilepsy Teams following. Avoid Fycompa 2/2 agitation in past  - 1 min seizure with clonic movements noted on EEG 8/16. No further seizures noted.  EEG 8/16-8/18  - Continue additional anti-seizure medication Briviact 50mg BID with additional 50mg post each HD session  - On Phenytoin and Briviact  - Appreciate neuro recommendations to wean and discontinue Phenytoin and to continue Briviact.  Given prolonged hospital course and current stable regimen, would prefer to make medication adjustments as outpatient.  Discussed with Neuro  - prior MRI head 6/27 with less concerns for PRES, likely ischemic changes  - o/n CTH 8/3 with no acute findings  - Follow up with neuro regarding seizure medications recs for discharge      R DCD DDRT 4/21/22 c/b 2A ACR  - 2A ACR: s/p pulse steroids. Continue Prednisone 10mg QD  - HD/UF as per nephrology for fluid overload/anuria --> Trial of Lasix 80 IV x1 today   - 8/14 surveillance BCx negative  - Repeated US from 8/25 did not show any renal artery stenosis, but increased resistive index, consistent with parenchymal disease was noted  - S/P removal of ureteral stent on 7/12  - 8/8 Punch biopsy wound Culture: ngtd. Negative for PTLD  - fungitel negative  - LE studies WNL    RLQ Cellulitis:  - Deferring right psoas muscle/fascia biopsy as wound is not worsening  - Complete course of Keflex today (8/20 - 9/4)     Immunosuppression:   - Belatacept: 6/23-8/1 discontinued  - Sirolimus 7 mg QD- Level reviewed 4.1 today (no changes made)   - Continue MMF at low dose 250mg bid  - Pred 10mg QD  - PPX: Nystatin, bactrim, valcyte (MWF)    DM  - Labile blood glucose  - On Lantus 5U and pre-meal 10/8/8U with ISS coverage  - Fingerstick ac and qhs  - Endo recs appreciated    AFib/R IJ DVT   - Full ac transitioned to coumadin on 8/31 dose daily per INR goal >2  - rate controlled  - SQH 5000u BID for DVT ppx while INR subtherapeutic   - Eliquis with ~40% less efficacy while on fosphenytoin      HTN:  - Amlodipine/Doxazosin. Increase hydralazine to 100 TID.   - off Coreg due to bradycardia    BPH:  - Continue Doxazosin/Proscar

## 2022-09-04 NOTE — PROGRESS NOTE ADULT - SUBJECTIVE AND OBJECTIVE BOX
[facility] DIVISION OF KIDNEY DISEASES AND HYPERTENSION -- FOLLOW UP NOTE  --------------------------------------------------------------------------------  Authored by: Ozzie Nova   Cell # [phone]     CHAD DUNBAR was seen and examined at bedside.   Patient is a 66y old  Male who presents with a chief complaint of Status Epilepticus (09-04-22)    24 hour events/subjective: No major events. Hemodynamically stable. Afebrile. Making more urine.       Standing Inpatient Medications  amLODIPine   Tablet 10 milliGRAM(s) Oral daily  artificial  tears Solution 2 Drop(s) Left EYE every 6 hours  brivaracetam 50 milliGRAM(s) Oral two times a day  doxazosin 4 milliGRAM(s) Oral <User Schedule>  epoetin cortney-epbx (RETACRIT) Injectable 59172 Unit(s) SubCutaneous <User Schedule>  finasteride 5 milliGRAM(s) Oral daily  heparin   Injectable 5000 Unit(s) SubCutaneous every 12 hours  hydrALAZINE 100 milliGRAM(s) Oral three times a day  insulin glargine Injectable (LANTUS) 5 Unit(s) SubCutaneous at bedtime  insulin lispro (ADMELOG) corrective regimen sliding scale   SubCutaneous three times a day before meals  insulin lispro (ADMELOG) corrective regimen sliding scale   SubCutaneous at bedtime  insulin lispro Injectable (ADMELOG) 10 Unit(s) SubCutaneous before breakfast  insulin lispro Injectable (ADMELOG) 8 Unit(s) SubCutaneous before lunch  insulin lispro Injectable (ADMELOG) 8 Unit(s) SubCutaneous before dinner  levothyroxine 50 MICROGram(s) Oral daily  melatonin 6 milliGRAM(s) Oral at bedtime  multivitamin 1 Tablet(s) Oral daily  mycophenolate mofetil 250 milliGRAM(s) Oral two times a day  nystatin    Suspension 317371 Unit(s) Oral four times a day  pantoprazole    Tablet 40 milliGRAM(s) Oral <User Schedule>  phenytoin   Capsule 200 milliGRAM(s) Oral two times a day  polyethylene glycol 3350 17 Gram(s) Oral daily  predniSONE   Tablet 10 milliGRAM(s) Oral daily  senna 2 Tablet(s) Oral at bedtime  sirolimus 7 milliGRAM(s) Oral <User Schedule>  trimethoprim   80 mG/sulfamethoxazole 400 mG 1 Tablet(s) Oral daily  valGANciclovir 450 milliGRAM(s) Oral <User Schedule>  warfarin 10 milliGRAM(s) Oral once    PRN Inpatient Medications  acetaminophen     Tablet .. 650 milliGRAM(s) Oral every 6 hours PRN  diphenhydrAMINE 25 milliGRAM(s) Oral at bedtime PRN  ondansetron Injectable 4 milliGRAM(s) IV Push once PRN  sodium chloride 0.9% lock flush 10 milliLiter(s) IV Push every 1 hour PRN      VITALS/PHYSICAL EXAM  --------------------------------------------------------------------------------  T(C): 36.8 (09-04-22 @ 09:00), Max: 37 (09-04-22 @ 05:00)  HR: 80 (09-04-22 @ 09:00) (75 - 83)  BP: 152/78 (09-04-22 @ 09:00) (152/67 - 184/75)  RR: 18 (09-04-22 @ 09:00) (18 - 20)  SpO2: 95% (09-04-22 @ 09:00) (95% - 97%)      09-03-22 @ 07:01  -  09-04-22 @ 07:00  --------------------------------------------------------  IN: 960 mL / OUT: 60 mL / NET: 900 mL      Physical Exam:  Awake and alert, communicates clearly   Thin, chronically ill appearing , no signs of acute distress  Lungs with bi basilar crackles   Left dialysis catheter tunneled   Abdomen soft, abdominal wall induration appears better, no tenderness  No LE edema     LABS/STUDIES  --------------------------------------------------------------------------------              8.5    5.77  >-----------<  468      [09-04-22 @ 06:05]              26.7     129  |  90  |  76  ----------------------------<  102      [09-04-22 @ 06:07]  4.9   |  22  |  4.70        Ca     8.9     [09-04-22 @ 06:07]      Mg     2.2     [09-04-22 @ 06:07]      Phos  4.8     [09-04-22 @ 06:07]    TPro  6.8  /  Alb  2.9  /  TBili  0.3  /  DBili  0.2  /  AST  20  /  ALT  22  /  AlkPhos  434  [09-04-22 @ 06:07]    PT/INR: PT 15.3 , INR 1.32       [09-04-22 @ 06:06]  PTT: 32.4       [09-04-22 @ 06:06]      Creatinine Trend:  SCr 4.70 [09-04 @ 06:07]  SCr 3.51 [09-03 @ 06:51]  SCr 4.78 [09-02 @ 06:53]  SCr 3.84 [09-01 @ 06:54]  SCr 5.10 [08-31 @ 06:35]      Sirolimus (Rapamycin) Level, Serum: 4.1 ng/mL (09-04 @ 06:08)  Sirolimus (Rapamycin) Level, Serum: 4.1 ng/mL (09-03 @ 06:53)  Sirolimus (Rapamycin) Level, Serum: 3.5 ng/mL (09-02 @ 06:47)  Sirolimus (Rapamycin) Level, Serum: 4.3 ng/mL (09-01 @ 06:54)

## 2022-09-04 NOTE — PROGRESS NOTE ADULT - SUBJECTIVE AND OBJECTIVE BOX
Transplant Surgery - Multidisciplinary Rounds  --------------------------------------------------------------  DDRT (DCD KDPI 82% 1a/1v/1u)   Date: 4/21/2022    Present:   Patient seen and examined with multidisciplinary team including Transplant Surgeon: Dr. Maddox, Dr. Tena, Dr. Davis, Dr. Mann, Dr. David, Dr. Barber. Transplant Nephrologist: Dr. Alfred, Dr. Waqar Zavaleta, Dr. Mary Lomeli,   Pharmacist: Danica Swartz. ACPs Jose, Jensen, Jayda, Stevenson, Vilma, Tone and unit RN during am rounds.  Disciplines not in attendance will be notified of the plan.     HPI: 66M with PMH: DM type II, HTN, CAD s/p CABG in 2020, AFib on Eliquis, CVA in 2019 due to Afib, Seizure d/o following CVA, last episode was on 4/8/22, h/o GIB in 2/2022 EGD with duodenal ulcer.  ESRD due to DM was on HD since 2019.   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  Donor was 58 , KDPI 82%, DCD, single vessels and ureter, HLA mismatch 1, 2, 2. No DSA, cPRA 0%. 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 Re-admitted in May for anemia in the setting of large perinephric hematoma s/p evacuation and repair of arterial anastomosis on 5/2/22. Intra operative biopsy showed no rejection, Creatinine ranging ~2mg/dL.    In Rehab:  He was recently dx with klebsiella UTI with Ertapenem - then switched to Levaquin    He also had parainfluenza pneumonia. Was at rehab progressing well.      Hospital course:  - 6/10: transferred from rehab facility for seizure status epilepticus. Intubated for respiratory protection and transferred to MICU.  EEG showed no seizure activity. Neuro consulted.   - 6/11: Extubated. Found to have R pleural effusion. s/p Thoracentesis 1.3L. Eliquis changed to heparin drip.  Post procedure drop in H&H. 5u PRBC, 2 u FFP.   - 6/11 evening: Transferred to SICU from MICU for further care in setting of hypoxia, significant R pleural effusion, anemia and hemodynamic instability  - 6/11 Intubated at 9pm and sedated on Precedex.  CT placed, 600ml blood drained.  1L total overnight, started pressors  - 6/11 Received Protamine for PTT >100 (was on Heparin drip started for AF), 2 u FFP and 5u PRBC total  - 6/13 s/p VATS 2.2L old blood removed; second chest tube placed  - 6/18-19: chest tubes removed  - 6/21: ?PRES vs. chronic ischemic changes on MRI, off Envarsus, switched to Belatacept 6/23 with good graft function  - 6/25: Tx to SICU for refractory seizures, improved with IV antiepileptic regimen  - 7/10: Downgraded from SICU to floor.   - 7/11: OR-->URETERAL STENT REMOVED  - 7/15: ESBL Kleb UTI (50-90K), completed Ertapenem x 3 Days  - 7/25: Tx to SICU for Sepsis/ elevated LFTS  - 7/27: Shiley placed for HD  - 7/28: ongoing fevers -> started Ertapenem/Fluc  - 7/29: HD restarted + 1U PRBC.  IR bx with 2A rejection, started pulse steroids. LFTs have improved  - 8/4: refractory seizures  - 8/8: s/p derm bx with no acute finding  - 8/8: s/p permacath exchange  - 8/16: One seizure on EEG medications changed added brivaracetam  - 8/22: HD, followed by U/S of Transplant kidney  - 8/31 Eliquis changed to coumadin (due to 40% less efficacy while on phenytoin      Interval Events:  - No HD yesterday, UOP improved to 50ml/24 hour time period   - INR 1.32- given Coum 7mg x1   - Endo recs appreciated for diabetes management given persistent HD need     Immunosuppression   Induction:  Simulect induction                                         Maintenance immunosuppression: Sirolimus 7mg daily, MMF 250mg BID, Pred 10   Ongoing monitoring for signs of rejection.    Potential Discharge date: TBD  Education:  Medications  Plan of care:  See Below    MEDICATIONS  (STANDING):  amLODIPine   Tablet 10 milliGRAM(s) Oral daily  artificial  tears Solution 2 Drop(s) Left EYE every 6 hours  brivaracetam 50 milliGRAM(s) Oral two times a day  chlorhexidine 2% Cloths 1 Application(s) Topical <User Schedule>  chlorhexidine 4% Liquid 1 Application(s) Topical <User Schedule>  doxazosin 4 milliGRAM(s) Oral <User Schedule>  epoetin cortney-epbx (RETACRIT) Injectable 75021 Unit(s) SubCutaneous <User Schedule>  finasteride 5 milliGRAM(s) Oral daily  furosemide   Injectable 80 milliGRAM(s) IV Push once  heparin   Injectable 5000 Unit(s) SubCutaneous every 12 hours  hydrALAZINE 100 milliGRAM(s) Oral three times a day  insulin glargine Injectable (LANTUS) 5 Unit(s) SubCutaneous at bedtime  insulin lispro (ADMELOG) corrective regimen sliding scale   SubCutaneous three times a day before meals  insulin lispro (ADMELOG) corrective regimen sliding scale   SubCutaneous at bedtime  insulin lispro Injectable (ADMELOG) 10 Unit(s) SubCutaneous before breakfast  insulin lispro Injectable (ADMELOG) 8 Unit(s) SubCutaneous before lunch  insulin lispro Injectable (ADMELOG) 8 Unit(s) SubCutaneous before dinner  levothyroxine 50 MICROGram(s) Oral daily  melatonin 6 milliGRAM(s) Oral at bedtime  multivitamin 1 Tablet(s) Oral daily  mycophenolate mofetil 250 milliGRAM(s) Oral two times a day  nystatin    Suspension 126738 Unit(s) Oral four times a day  pantoprazole    Tablet 40 milliGRAM(s) Oral <User Schedule>  phenytoin   Capsule 200 milliGRAM(s) Oral two times a day  polyethylene glycol 3350 17 Gram(s) Oral daily  predniSONE   Tablet 10 milliGRAM(s) Oral daily  senna 2 Tablet(s) Oral at bedtime  sirolimus 7 milliGRAM(s) Oral <User Schedule>  trimethoprim   80 mG/sulfamethoxazole 400 mG 1 Tablet(s) Oral daily  valGANciclovir 450 milliGRAM(s) Oral <User Schedule>  warfarin 10 milliGRAM(s) Oral once    MEDICATIONS  (PRN):  acetaminophen     Tablet .. 650 milliGRAM(s) Oral every 6 hours PRN Mild Pain (1 - 3)  diphenhydrAMINE 25 milliGRAM(s) Oral at bedtime PRN Insomnia  ondansetron Injectable 4 milliGRAM(s) IV Push once PRN Nausea and/or Vomiting  sodium chloride 0.9% lock flush 10 milliLiter(s) IV Push every 1 hour PRN Pre/post blood products, medications, blood draw, and to maintain line patency      PAST MEDICAL & SURGICAL HISTORY:  Hypertension  Diabetes  Dyslipidemia  CAD (Coronary Artery Disease)-with Stents in 06/2009 and 6/2019, s/p off-pump C3L on 8/13/20  Hypothyroidism  CVA (cerebral vascular accident)-12/13/19 with residual bilateral weakness  Anemia  PEG (percutaneous endoscopic gastrostomy) status-removed July 2020  Intubation of airway performed without difficulty-Dec 2019-CVA c/b status epilepticus requiring intubation and PEG in 12/2019-  ESRD on dialysis-M/W/F  Seizures-after CVA, last seizure was last week 4/07/22  History of insertion of stent into coronary artery bypass graft-2009 and 2019  S/P CABG x 3-off pump C3L on 8/13/20      Vital Signs Last 24 Hrs  T(C): 36.8 (04 Sep 2022 09:00), Max: 37 (04 Sep 2022 05:00)  T(F): 98.2 (04 Sep 2022 09:00), Max: 98.6 (04 Sep 2022 05:00)  HR: 80 (04 Sep 2022 09:00) (75 - 83)  BP: 152/78 (04 Sep 2022 09:00) (152/67 - 184/75)  BP(mean): 103 (04 Sep 2022 05:00) (97 - 108)  RR: 18 (04 Sep 2022 09:00) (18 - 20)  SpO2: 95% (04 Sep 2022 09:00) (95% - 97%)    Parameters below as of 04 Sep 2022 09:00  Patient On (Oxygen Delivery Method): room air      I&O's Summary    03 Sep 2022 07:01  -  04 Sep 2022 07:00  --------------------------------------------------------  IN: 960 mL / OUT: 60 mL / NET: 900 mL      Labs                     8.5    5.77  )-----------( 468      ( 04 Sep 2022 06:05 )             26.7     09-04    129<L>  |  90<L>  |  76<H>  ----------------------------<  102<H>  4.9   |  22  |  4.70<H>    Ca    8.9      04 Sep 2022 06:07  Phos  4.8     09-04  Mg     2.2     09-04    TPro  6.8  /  Alb  2.9<L>  /  TBili  0.3  /  DBili  0.2  /  AST  20  /  ALT  22  /  AlkPhos  434<H>  09-04        Review of systems  Gen: No weight changes, fatigue, fevers/chills, weakness  Skin: No rashes  Head/Eyes/Ears/Mouth: No headache; Normal hearing; Normal vision w/o blurriness; No sinus pain/discomfort, sore throat  Respiratory: No dyspnea, cough, wheezing, hemoptysis  CV: No chest pain, PND, orthopnea  GI: Mild abdominal pain at surgical incision site; denies diarrhea, constipation, nausea, vomiting, melena, hematochezia  : No increased frequency, dysuria, hematuria, nocturia  MSK: No joint pain/swelling; no back pain; no edema  Neuro: No dizziness/lightheadedness, weakness, seizures, numbness, tingling  Heme: No easy bruising or bleeding  Endo: No heat/cold intolerance  Psych: No significant nervousness, anxiety, stress, depression  All other systems were reviewed and are negative, except as noted.      PHYSICAL EXAM:  Constitutional: Well developed / well nourished  Neuro: A&O to person/place/time   Eyes: Anicteric, PERRLA  ENMT: nc/at  Neck: supple   Chest: LIJ permcath without exudate/erythema   Respiratory: CTA B/L  Cardiovascular: RRR  Gastrointestinal: Soft, non distended, mild tenderness at the incision site; incision c/d/i; erythema/rash improved   Genitourinary: Voiding spontaneously  Extremities: SCD's in place and working bilaterally   Vascular: Palpable dp pulses bilaterally  Neurological: A&O x3  Skin: no rashes, ulcerations or lesions;  Musculoskeletal: Moving all extremities  Psychiatric: Responsive

## 2022-09-04 NOTE — PROGRESS NOTE ADULT - NUTRITIONAL ASSESSMENT
This patient has been assessed with a concern for Malnutrition and has been determined to have a diagnosis/diagnoses of Severe protein-calorie malnutrition.    This patient is being managed with:   Diet Consistent Carbohydrate w/Evening Snack-  1000mL Fluid Restriction ([id number])  No Concentrated Phosphorus  Supplement Feeding Modality:  Oral  Nepro Cans or Servings Per Day:  3       Frequency:  Daily  Entered: Aug 29 2022  4:09PM

## 2022-09-04 NOTE — PROGRESS NOTE ADULT - PROBLEM SELECTOR PLAN 4
Anemia in the setting of renal disease. Continue with EPO 10K untis three times per week with HD. Hg below goal at this time. Monitor. Hg.     If any questions, please feel free to contact me     Steven Hyatt  Nephrology Fellow  [facility] Pager: [phone].

## 2022-09-04 NOTE — PROGRESS NOTE ADULT - ASSESSMENT
66 yr old Male with PMHx ESRD s/p permacath removal 5/10/22 s/p Rt DDRT 4/21/22,  CVA (2019), Afib on apixaban, seizure activity, CAD s/p stents, CABG (2020), HTN, HLD, DM on insulin, gastric/duodenal ulcer, recent hospitalization 4/28/22 -5/10/22 with weakness/anemia found to have perinephric hematoma requiring evacuation and repair of bleeding arterial anastomosis. Curently being treated for UTI with Levaquin Today after developing multiple sz episodes refractory to 5 mg versed IM (EMS) and 2 mg ativan IV in E.D. concerning for status epilepticus requiring intubation for hypoxic respiratory  failure. MICU Consult was called for hypoxic respiratory failure secondary to status epilepticus.  Nephrology consulted for renal failure.     A/P  DCD KTX on 04/21/22  Course complicated by DGF, was on HD until 4/29/22. Readmitted in May for anemia in the setting of large perinephric hematoma s/p evacuation and repair of arterial anastomosis on 5/02/22. Intra operative biopsy showed no rejection.  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   Now with failed allograft function, Remains dialysis dependent.   continue Immunosuppression per transplant team  s/p Perma cath placement 08/30/22   last HD 09/02/22 - RRT per transplant team   transplant team on board   monitor BMP, U/O     Immunosuppression per transplant team  - Belatacept: 6/23-8/1/2022 discontinued  - on Sirolimus since 8/1/2022 based on level,  Prednisone 10 mg QD 8/16  Cellcept 250 BID on hold   - PPX:  Nystatin, bactrim, valcyte (MWF)      HTN   BP suboptimal  Consider to add carvedilol 3.125mg BID   monitor BP closely      66 yr old Male with PMHx ESRD s/p permacath removal 5/10/22 s/p Rt DDRT 4/21/22,  CVA (2019), Afib on apixaban, seizure activity, CAD s/p stents, CABG (2020), HTN, HLD, DM on insulin, gastric/duodenal ulcer, recent hospitalization 4/28/22 -5/10/22 with weakness/anemia found to have perinephric hematoma requiring evacuation and repair of bleeding arterial anastomosis. Curently being treated for UTI with Levaquin Today after developing multiple sz episodes refractory to 5 mg versed IM (EMS) and 2 mg ativan IV in E.D. concerning for status epilepticus requiring intubation for hypoxic respiratory  failure. MICU Consult was called for hypoxic respiratory failure secondary to status epilepticus.  Nephrology consulted for renal failure.     A/P  DCD KTX on 04/21/22  Course complicated by DGF, was on HD until 4/29/22. Readmitted in May for anemia in the setting of large perinephric hematoma s/p evacuation and repair of arterial anastomosis on 5/02/22. Intra operative biopsy showed no rejection.  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   Now with failed allograft function, Remains dialysis dependent.   continue Immunosuppression per transplant team  s/p Perma cath placement 08/30/22   last HD 09/02/22 - RRT per transplant team   transplant team on board   monitor BMP, U/O     Immunosuppression per transplant team  - Belatacept: 6/23-8/1/2022 discontinued  - on Sirolimus since 8/1/2022 based on level,  Prednisone 10 mg QD 8/16  Cellcept 250 BID on hold   - PPX:  Nystatin, bactrim, valcyte (MWF)      HTN   BP suboptimal  Consider to add carvedilol  monitor BP closely

## 2022-09-05 NOTE — PROGRESS NOTE ADULT - ASSESSMENT
66 yr old Male with PMHx ESRD s/p permacath removal 5/10/22 s/p Rt DDRT 4/21/22,  CVA (2019), Afib on apixaban, seizure activity, CAD s/p stents, CABG (2020), HTN, HLD, DM on insulin, gastric/duodenal ulcer, recent hospitalization 4/28/22 -5/10/22 with weakness/anemia found to have perinephric hematoma requiring evacuation and repair of bleeding arterial anastomosis. Curently being treated for UTI with Levaquin Today after developing multiple sz episodes refractory to 5 mg versed IM (EMS) and 2 mg ativan IV in E.D. concerning for status epilepticus requiring intubation for hypoxic respiratory  failure. MICU Consult was called for hypoxic respiratory failure secondary to status epilepticus.  Nephrology consulted for renal failure.     A/P  DCD KTX on 04/21/22  Course complicated by DGF, was on HD until 4/29/22. Readmitted in May for anemia in the setting of large perinephric hematoma s/p evacuation and repair of arterial anastomosis on 5/02/22. Intra operative biopsy showed no rejection.  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   Now with failed allograft function, Remains dialysis dependent.   continue Immunosuppression per transplant team  s/p Perma cath placement 08/30/22   plan for HD today -  RRT per transplant team   transplant team on board   monitor BMP, U/O     Immunosuppression per transplant team  - Belatacept: 6/23-8/1/2022 discontinued  - on Sirolimus since 8/1/2022 based on level,  Prednisone 10 mg QD 8/16  Cellcept 250 BID on hold   - PPX:  Nystatin, bactrim, valcyte (MWF)      HTN   BP fluctuating   carvedilol added 09/04/22   monitor BP closely

## 2022-09-05 NOTE — PROGRESS NOTE ADULT - SUBJECTIVE AND OBJECTIVE BOX
Transplant Surgery - Multidisciplinary Rounds  --------------------------------------------------------------  DDRT (DCD KDPI 82% 1a/1v/1u)   Date: 4/21/2022    Present:   Patient seen and examined with multidisciplinary team including Transplant Surgeon: Dr. Tena, Transplant Nephrologist: Dr. Nova, [facility], and unit RN during am rounds.  Disciplines not in attendance will be notified of the plan.     HPI: 66M with PMH: DM type II, HTN, CAD s/p CABG in 2020, AFib on Eliquis, CVA in 2019 due to Afib, Seizure d/o following CVA, last episode was on 4/8/22, h/o GIB in 2/2022 EGD with duodenal ulcer.  ESRD due to DM was on HD since 2019.   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  Donor was 57 , KDPI 82%, DCD, single vessels and ureter, HLA mismatch 1, 2, 2. No DSA, cPRA 0%. 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 Re-admitted in May for anemia in the setting of large perinephric hematoma s/p evacuation and repair of arterial anastomosis on 5/2/22. Intra operative biopsy showed no rejection, Creatinine ranging ~2mg/dL.    In Rehab:  He was recently dx with klebsiella UTI with Ertapenem - then switched to Levaquin    He also had parainfluenza pneumonia. Was at rehab progressing well.      Hospital course:  - 6/10: transferred from rehab facility for seizure status epilepticus. Intubated for respiratory protection and transferred to MICU.  EEG showed no seizure activity. Neuro consulted.   - 6/11: Extubated. Found to have R pleural effusion. s/p Thoracentesis 1.3L. Eliquis changed to heparin drip.  Post procedure drop in H&H. 5u PRBC, 2 u FFP.   - 6/11 evening: Transferred to SICU from MICU for further care in setting of hypoxia, significant R pleural effusion, anemia and hemodynamic instability  - 6/11 Intubated at 9pm and sedated on Precedex.  CT placed, 600ml blood drained.  1L total overnight, started pressors  - 6/11 Received Protamine for PTT >100 (was on Heparin drip started for AF), 2 u FFP and 5u PRBC total  - 6/13 s/p VATS 2.2L old blood removed; second chest tube placed  - 6/18-19: chest tubes removed  - 6/21: ?PRES vs. chronic ischemic changes on MRI, off Envarsus, switched to Belatacept 6/23 with good graft function  - 6/25: Tx to SICU for refractory seizures, improved with IV antiepileptic regimen  - 7/10: Downgraded from SICU to floor.   - 7/11: OR-->URETERAL STENT REMOVED  - 7/15: ESBL Kleb UTI (50-90K), completed Ertapenem x 3 Days  - 7/25: Tx to SICU for Sepsis/ elevated LFTS  - 7/27: Shiley placed for HD  - 7/28: ongoing fevers -> started Ertapenem/Fluc  - 7/29: HD restarted + 1U PRBC.  IR bx with 2A rejection, started pulse steroids. LFTs have improved  - 8/4: refractory seizures  - 8/8: s/p derm bx with no acute finding  - 8/8: s/p permacath exchange  - 8/16: One seizure on EEG medications changed added brivaracetam  - 8/22: HD, followed by U/S of Transplant kidney  - 8/31 Eliquis changed to coumadin (due to 40% less efficacy while on phenytoin      Interval Events:  - Last HD 9/3.   - INR 1.32 yesterday, was given coumadin 10mg.   - was given lasix 80 X1 yesterday, voided 180cc.   - started Coreg 12.5 BID yesterday w/ improvement in BP    Immunosuppression   Induction:  Simulect induction                                         Maintenance immunosuppression: Sirolimus 7mg daily, MMF 250mg BID, Pred 10   Ongoing monitoring for signs of rejection.    Potential Discharge date: TBD  Education:  Medications  Plan of care:  See Below      MEDICATIONS  (STANDING):  amLODIPine   Tablet 10 milliGRAM(s) Oral daily  artificial  tears Solution 2 Drop(s) Left EYE every 6 hours  brivaracetam 50 milliGRAM(s) Oral two times a day  carvedilol 12.5 milliGRAM(s) Oral every 12 hours  chlorhexidine 2% Cloths 1 Application(s) Topical <User Schedule>  chlorhexidine 4% Liquid 1 Application(s) Topical <User Schedule>  doxazosin 4 milliGRAM(s) Oral <User Schedule>  epoetin cortney-epbx (RETACRIT) Injectable 98783 Unit(s) SubCutaneous <User Schedule>  finasteride 5 milliGRAM(s) Oral daily  heparin   Injectable 5000 Unit(s) SubCutaneous every 12 hours  hydrALAZINE 100 milliGRAM(s) Oral three times a day  insulin glargine Injectable (LANTUS) 5 Unit(s) SubCutaneous at bedtime  insulin lispro (ADMELOG) corrective regimen sliding scale   SubCutaneous three times a day before meals  insulin lispro (ADMELOG) corrective regimen sliding scale   SubCutaneous at bedtime  insulin lispro Injectable (ADMELOG) 10 Unit(s) SubCutaneous before breakfast  insulin lispro Injectable (ADMELOG) 8 Unit(s) SubCutaneous before lunch  insulin lispro Injectable (ADMELOG) 8 Unit(s) SubCutaneous before dinner  levothyroxine 50 MICROGram(s) Oral daily  melatonin 6 milliGRAM(s) Oral at bedtime  multivitamin 1 Tablet(s) Oral daily  mycophenolate mofetil 250 milliGRAM(s) Oral two times a day  nystatin    Suspension 976421 Unit(s) Oral four times a day  pantoprazole    Tablet 40 milliGRAM(s) Oral <User Schedule>  phenytoin   Capsule 200 milliGRAM(s) Oral two times a day  polyethylene glycol 3350 17 Gram(s) Oral daily  predniSONE   Tablet 10 milliGRAM(s) Oral daily  senna 2 Tablet(s) Oral at bedtime  sirolimus 7 milliGRAM(s) Oral <User Schedule>  trimethoprim   80 mG/sulfamethoxazole 400 mG 1 Tablet(s) Oral daily  valGANciclovir 450 milliGRAM(s) Oral <User Schedule>    MEDICATIONS  (PRN):  acetaminophen     Tablet .. 650 milliGRAM(s) Oral every 6 hours PRN Mild Pain (1 - 3)  diphenhydrAMINE 25 milliGRAM(s) Oral at bedtime PRN Insomnia  ondansetron Injectable 4 milliGRAM(s) IV Push once PRN Nausea and/or Vomiting  sodium chloride 0.9% lock flush 10 milliLiter(s) IV Push every 1 hour PRN Pre/post blood products, medications, blood draw, and to maintain line patency      PAST MEDICAL & SURGICAL HISTORY:  Hypertension      Diabetes      Dyslipidemia      CAD (Coronary Artery Disease)  with Stents in 06/2009 and 6/2019, s/p off-pump C3L on 8/13/20      Hypothyroidism      CVA (cerebral vascular accident)  12/13/19 with residual bilateral weakness      Anemia      PEG (percutaneous endoscopic gastrostomy) status  removed July 2020      Intubation of airway performed without difficulty  Dec 2019  CVA c/b status epilepticus requiring intubation and PEG in 12/2019-      ESRD on dialysis  M/W/F      Seizures  after CVA, last seizure was last week 4/07/22      History of insertion of stent into coronary artery bypass graft  2009 and 2019      S/P CABG x 3  off pump C3L on 8/13/20          Vital Signs Last 24 Hrs  T(C): 36.5 (05 Sep 2022 05:40), Max: 36.8 (04 Sep 2022 12:35)  T(F): 97.7 (05 Sep 2022 05:40), Max: 98.2 (04 Sep 2022 12:35)  HR: 64 (05 Sep 2022 05:40) (64 - 78)  BP: 148/68 (05 Sep 2022 05:40) (131/61 - 169/75)  BP(mean): 98 (05 Sep 2022 05:40) (88 - 98)  RR: 18 (05 Sep 2022 05:40) (18 - 18)  SpO2: 97% (05 Sep 2022 05:40) (96% - 100%)    Parameters below as of 05 Sep 2022 05:40  Patient On (Oxygen Delivery Method): room air        I&O's Summary    04 Sep 2022 07:01  -  05 Sep 2022 07:00  --------------------------------------------------------  IN: 1300 mL / OUT: 180 mL / NET: 1120 mL    05 Sep 2022 07:01  -  05 Sep 2022 09:11  --------------------------------------------------------  IN: 240 mL / OUT: 20 mL / NET: 220 mL                              8.2    5.77  )-----------( 419      ( 05 Sep 2022 06:20 )             26.3     09-05    129<L>  |  88<L>  |  94<H>  ----------------------------<  154<H>  5.3   |  19<L>  |  5.62<H>    Ca    9.0      05 Sep 2022 06:20  Phos  5.9     09-05  Mg     2.2     09-05    TPro  6.7  /  Alb  2.5<L>  /  TBili  0.4  /  DBili  0.2  /  AST  40  /  ALT  34  /  AlkPhos  568<H>  09-05      Review of systems  Gen: No weight changes, fatigue, fevers/chills, weakness  Skin: No rashes  Head/Eyes/Ears/Mouth: No headache; Normal hearing; Normal vision w/o blurriness; No sinus pain/discomfort, sore throat  Respiratory: No dyspnea, cough, wheezing, hemoptysis  CV: No chest pain, PND, orthopnea  GI: Mild abdominal pain at surgical incision site; denies diarrhea, constipation, nausea, vomiting, melena, hematochezia  : No increased frequency, dysuria, hematuria, nocturia  MSK: No joint pain/swelling; no back pain; no edema  Neuro: No dizziness/lightheadedness, weakness, seizures, numbness, tingling  Heme: No easy bruising or bleeding  Endo: No heat/cold intolerance  Psych: No significant nervousness, anxiety, stress, depression  All other systems were reviewed and are negative, except as noted.      PHYSICAL EXAM:  Constitutional: Well developed / well nourished  Neuro: A&O to person/place/time   Eyes: Anicteric, PERRLA  ENMT: nc/at  Neck: supple   Chest: LIJ permcath without exudate/erythema   Respiratory: CTA B/L  Cardiovascular: RRR  Gastrointestinal: Soft, non distended, mild tenderness at the incision site; incision c/d/i; erythema/rash improved   Genitourinary: Voiding spontaneously  Extremities: SCD's in place and working bilaterally   Vascular: Palpable dp pulses bilaterally  Neurological: A&O x3  Skin: no rashes, ulcerations or lesions;  Musculoskeletal: Moving all extremities  Psychiatric: Responsive     Transplant Surgery - Multidisciplinary Rounds  --------------------------------------------------------------  DDRT (DCD KDPI 82% 1a/1v/1u)   Date: 4/21/2022    Present:   Patient seen and examined with multidisciplinary team including Transplant Surgeon: Dr. Tena, Transplant Nephrologist: Dr. Nova, [facility], and unit RN during am rounds.  Disciplines not in attendance will be notified of the plan.     HPI: 66M with PMH: DM type II, HTN, CAD s/p CABG in 2020, AFib on Eliquis, CVA in 2019 due to Afib, Seizure d/o following CVA, last episode was on 4/8/22, h/o GIB in 2/2022 EGD with duodenal ulcer.  ESRD due to DM was on HD since 2019.   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  Donor was 58 , KDPI 82%, DCD, single vessels and ureter, HLA mismatch 1, 2, 2. No DSA, cPRA 0%. 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 Re-admitted in May for anemia in the setting of large perinephric hematoma s/p evacuation and repair of arterial anastomosis on 5/2/22. Intra operative biopsy showed no rejection, Creatinine ranging ~2mg/dL.    In Rehab:  He was recently dx with klebsiella UTI with Ertapenem - then switched to Levaquin    He also had parainfluenza pneumonia. Was at rehab progressing well.      Hospital course:  - 6/10: transferred from rehab facility for seizure status epilepticus. Intubated for respiratory protection and transferred to MICU.  EEG showed no seizure activity. Neuro consulted.   - 6/11: Extubated. Found to have R pleural effusion. s/p Thoracentesis 1.3L. Eliquis changed to heparin drip.  Post procedure drop in H&H. 5u PRBC, 2 u FFP.   - 6/11 evening: Transferred to SICU from MICU for further care in setting of hypoxia, significant R pleural effusion, anemia and hemodynamic instability  - 6/11 Intubated at 9pm and sedated on Precedex.  CT placed, 600ml blood drained.  1L total overnight, started pressors  - 6/11 Received Protamine for PTT >100 (was on Heparin drip started for AF), 2 u FFP and 5u PRBC total  - 6/13 s/p VATS 2.2L old blood removed; second chest tube placed  - 6/18-19: chest tubes removed  - 6/21: ?PRES vs. chronic ischemic changes on MRI, off Envarsus, switched to Belatacept 6/23 with good graft function  - 6/25: Tx to SICU for refractory seizures, improved with IV antiepileptic regimen  - 7/10: Downgraded from SICU to floor.   - 7/11: OR-->URETERAL STENT REMOVED  - 7/15: ESBL Kleb UTI (50-90K), completed Ertapenem x 3 Days  - 7/25: Tx to SICU for Sepsis/ elevated LFTS  - 7/27: Shiley placed for HD  - 7/28: ongoing fevers -> started Ertapenem/Fluc  - 7/29: HD restarted + 1U PRBC.  IR bx with 2A rejection, started pulse steroids. LFTs have improved  - 8/4: refractory seizures  - 8/8: s/p derm bx with no acute finding  - 8/8: s/p permacath exchange  - 8/16: One seizure on EEG medications changed added brivaracetam  - 8/22: HD, followed by U/S of Transplant kidney  - 8/31 Eliquis changed to coumadin (due to 40% less efficacy while on phenytoin      Interval Events:  - Last HD 9/3.   - INR 1.32 yesterday, was given coumadin 10mg.   - was given lasix 80 X1 yesterday, voided 180cc.   - started Coreg 12.5 BID yesterday w/ improvement in BP  - alk phos  568<----434<----454<----477<---533      Immunosuppression   Induction:  Simulect induction                                         Maintenance immunosuppression: Sirolimus 7mg daily, MMF 250mg BID, Pred 10   Ongoing monitoring for signs of rejection.    Potential Discharge date: TBD  Education:  Medications  Plan of care:  See Below      MEDICATIONS  (STANDING):  amLODIPine   Tablet 10 milliGRAM(s) Oral daily  artificial  tears Solution 2 Drop(s) Left EYE every 6 hours  brivaracetam 50 milliGRAM(s) Oral two times a day  carvedilol 12.5 milliGRAM(s) Oral every 12 hours  chlorhexidine 2% Cloths 1 Application(s) Topical <User Schedule>  chlorhexidine 4% Liquid 1 Application(s) Topical <User Schedule>  doxazosin 4 milliGRAM(s) Oral <User Schedule>  epoetin cortney-epbx (RETACRIT) Injectable 92571 Unit(s) SubCutaneous <User Schedule>  finasteride 5 milliGRAM(s) Oral daily  heparin   Injectable 5000 Unit(s) SubCutaneous every 12 hours  hydrALAZINE 100 milliGRAM(s) Oral three times a day  insulin glargine Injectable (LANTUS) 5 Unit(s) SubCutaneous at bedtime  insulin lispro (ADMELOG) corrective regimen sliding scale   SubCutaneous three times a day before meals  insulin lispro (ADMELOG) corrective regimen sliding scale   SubCutaneous at bedtime  insulin lispro Injectable (ADMELOG) 10 Unit(s) SubCutaneous before breakfast  insulin lispro Injectable (ADMELOG) 8 Unit(s) SubCutaneous before lunch  insulin lispro Injectable (ADMELOG) 8 Unit(s) SubCutaneous before dinner  levothyroxine 50 MICROGram(s) Oral daily  melatonin 6 milliGRAM(s) Oral at bedtime  multivitamin 1 Tablet(s) Oral daily  mycophenolate mofetil 250 milliGRAM(s) Oral two times a day  nystatin    Suspension 191212 Unit(s) Oral four times a day  pantoprazole    Tablet 40 milliGRAM(s) Oral <User Schedule>  phenytoin   Capsule 200 milliGRAM(s) Oral two times a day  polyethylene glycol 3350 17 Gram(s) Oral daily  predniSONE   Tablet 10 milliGRAM(s) Oral daily  senna 2 Tablet(s) Oral at bedtime  sirolimus 7 milliGRAM(s) Oral <User Schedule>  trimethoprim   80 mG/sulfamethoxazole 400 mG 1 Tablet(s) Oral daily  valGANciclovir 450 milliGRAM(s) Oral <User Schedule>    MEDICATIONS  (PRN):  acetaminophen     Tablet .. 650 milliGRAM(s) Oral every 6 hours PRN Mild Pain (1 - 3)  diphenhydrAMINE 25 milliGRAM(s) Oral at bedtime PRN Insomnia  ondansetron Injectable 4 milliGRAM(s) IV Push once PRN Nausea and/or Vomiting  sodium chloride 0.9% lock flush 10 milliLiter(s) IV Push every 1 hour PRN Pre/post blood products, medications, blood draw, and to maintain line patency      PAST MEDICAL & SURGICAL HISTORY:  Hypertension      Diabetes      Dyslipidemia      CAD (Coronary Artery Disease)  with Stents in 06/2009 and 6/2019, s/p off-pump C3L on 8/13/20      Hypothyroidism      CVA (cerebral vascular accident)  12/13/19 with residual bilateral weakness      Anemia      PEG (percutaneous endoscopic gastrostomy) status  removed July 2020      Intubation of airway performed without difficulty  Dec 2019  CVA c/b status epilepticus requiring intubation and PEG in 12/2019-      ESRD on dialysis  M/W/F      Seizures  after CVA, last seizure was last week 4/07/22      History of insertion of stent into coronary artery bypass graft  2009 and 2019      S/P CABG x 3  off pump C3L on 8/13/20          Vital Signs Last 24 Hrs  T(C): 36.5 (05 Sep 2022 05:40), Max: 36.8 (04 Sep 2022 12:35)  T(F): 97.7 (05 Sep 2022 05:40), Max: 98.2 (04 Sep 2022 12:35)  HR: 64 (05 Sep 2022 05:40) (64 - 78)  BP: 148/68 (05 Sep 2022 05:40) (131/61 - 169/75)  BP(mean): 98 (05 Sep 2022 05:40) (88 - 98)  RR: 18 (05 Sep 2022 05:40) (18 - 18)  SpO2: 97% (05 Sep 2022 05:40) (96% - 100%)    Parameters below as of 05 Sep 2022 05:40  Patient On (Oxygen Delivery Method): room air        I&O's Summary    04 Sep 2022 07:01  -  05 Sep 2022 07:00  --------------------------------------------------------  IN: 1300 mL / OUT: 180 mL / NET: 1120 mL    05 Sep 2022 07:01  -  05 Sep 2022 09:11  --------------------------------------------------------  IN: 240 mL / OUT: 20 mL / NET: 220 mL                              8.2    5.77  )-----------( 419      ( 05 Sep 2022 06:20 )             26.3     09-05    129<L>  |  88<L>  |  94<H>  ----------------------------<  154<H>  5.3   |  19<L>  |  5.62<H>    Ca    9.0      05 Sep 2022 06:20  Phos  5.9     09-05  Mg     2.2     09-05    TPro  6.7  /  Alb  2.5<L>  /  TBili  0.4  /  DBili  0.2  /  AST  40  /  ALT  34  /  AlkPhos  568<H>  09-05      Review of systems  Gen: No weight changes, fatigue, fevers/chills, weakness  Skin: No rashes  Head/Eyes/Ears/Mouth: No headache; Normal hearing; Normal vision w/o blurriness; No sinus pain/discomfort, sore throat  Respiratory: No dyspnea, cough, wheezing, hemoptysis  CV: No chest pain, PND, orthopnea  GI: Mild abdominal pain at surgical incision site; denies diarrhea, constipation, nausea, vomiting, melena, hematochezia  : No increased frequency, dysuria, hematuria, nocturia  MSK: No joint pain/swelling; no back pain; no edema  Neuro: No dizziness/lightheadedness, weakness, seizures, numbness, tingling  Heme: No easy bruising or bleeding  Endo: No heat/cold intolerance  Psych: No significant nervousness, anxiety, stress, depression  All other systems were reviewed and are negative, except as noted.      PHYSICAL EXAM:  Constitutional: Well developed / well nourished  Neuro: A&O to person/place/time   Eyes: Anicteric, PERRLA  ENMT: nc/at  Neck: supple   Chest: LIJ permcath without exudate/erythema   Respiratory: CTA B/L  Cardiovascular: RRR  Gastrointestinal: Soft, non distended, mild tenderness at the incision site; incision c/d/i; erythema/rash improved   Genitourinary: Voiding spontaneously  Extremities: SCD's in place and working bilaterally   Vascular: Palpable dp pulses bilaterally  Neurological: A&O x3  Skin: no rashes, ulcerations or lesions;  Musculoskeletal: Moving all extremities  Psychiatric: Responsive

## 2022-09-05 NOTE — PROGRESS NOTE ADULT - ASSESSMENT
66 yr old man with h/o DM, HTN, CAD s/p CABG, Afib on Eliquis, CVA in 2019, Seizure d/o s/p DDRT from DCD donor on 4/21/22.   Complicated post transplant course - initial DGF eventually recovered. Klebsiella UTI/Sepsis rx with antibiotics, status epilepticus, Envarsus discontinued for neurotoxicity/seizures and he was started on belatacept -> complicated by ANA due to rejection Grade 2a treated with pulse steroids, Currently HD dependent.     S/p DDRT from DCD donor on 4/21/22    Poor graft function due to Grade 2a rejection (biopsy 7/29) , c4d negative, No DSA.    S/p pulse solumedrol completed 8/3. Thymo not given due to frailty    Remains dialysis dependent. Oliguric. Transplant US on 8/25 homogenous flow, no hydro, 1.9 x 1.5 cm collection, no significant TRAS.    HD today. UF 2 liters as tolerated.    Lasix 80mg po on non dialysis days     Immunosuppression   - MMF on hold due to recurrent infections, Envarsus d/darren for seizures and poor mental status and changed to Belatacept. Currently on sirolimus and prednisone   - Continue sirolimus 7mg daily   - Continue prednisone 10mg po daily   -Cellcept 250mg po bid (resumed 9/3/22), increase to 500mg po bid     Infection prophylaxis - Nystatin, Bactrim, Valcyte     Recurrent ESBL Klebsiella UTI - 7/15 and 7/25. Completed ertapenem on 8/3     Anemia s/p PRBC 1 unit 8/26. Continue epoetin 10,000 units sq TIW with HD   Hypertension - Coreg 12.5mg po bid added yesterday. Continue Amlodipine 10, Doxazosin 4mg bid. Discontinue Hydralazine as BP is running lower after adding Coreg.   Diabetes - Controlled, Continue Lantus 5 and Admelog pre meal and sliding scale   Seizure d/o - on Briviact and phenytoin. Neurology following.   {[id number]}A.fib - Eliquis d/darren due to drug interactions. On Coumadin, INR sub therapeutic, Coumadin  10mg po tonight.          66 yr old man with h/o DM, HTN, CAD s/p CABG, Afib on Eliquis, CVA in 2019, Seizure d/o s/p DDRT from DCD donor on 4/21/22.   Complicated post transplant course - initial DGF eventually recovered. Klebsiella UTI/Sepsis rx with antibiotics, status epilepticus, Envarsus discontinued for neurotoxicity/seizures and he was started on belatacept -> complicated by ANA due to rejection Grade 2a treated with pulse steroids, Currently HD dependent.     S/p DDRT from DCD donor on 4/21/22    Poor graft function due to Grade 2a rejection (biopsy 7/29) , c4d negative, No DSA.    S/p pulse solumedrol completed 8/3. Thymo not given due to frailty    Remains dialysis dependent. Oliguric. Transplant US on 8/25 homogenous flow, no hydro, 1.9 x 1.5 cm collection, no significant TRAS.    HD today. UF 2 liters as tolerated.    Lasix 80mg po on non dialysis days     Immunosuppression   - MMF on hold due to recurrent infections, Envarsus d/darren for seizures and poor mental status and changed to Belatacept. Currently on sirolimus and prednisone   - Continue sirolimus 7mg daily   - Continue prednisone 10mg po daily   -Cellcept 250mg po bid (resumed 9/3/22), increase to 500mg po bid     Infection prophylaxis - Nystatin, Bactrim, Valcyte     Recurrent ESBL Klebsiella UTI - 7/15 and 7/25. Completed ertapenem on 8/3     Anemia s/p PRBC 1 unit 8/26. Continue epoetin 10,000 units sq TIW with HD   Hypertension - Coreg 12.5mg po bid added yesterday. Continue Amlodipine 10, Doxazosin 4mg bid. Discontinue Hydralazine as BP is running lower after adding Coreg.   Diabetes - Controlled, Continue Lantus 5 and Admelog pre meal and sliding scale   Seizure d/o - on Briviact and phenytoin. Neurology following.   {[id number]}A.fib - Eliquis d/darren due to drug interactions. On Coumadin, INR sub therapeutic, Coumadin  10mg po tonight.     D/c planning to rehab in progress.

## 2022-09-05 NOTE — PROGRESS NOTE ADULT - SUBJECTIVE AND OBJECTIVE BOX
[facility] DIVISION OF KIDNEY DISEASES AND HYPERTENSION -- FOLLOW UP NOTE  --------------------------------------------------------------------------------  Authored by: Ozzie Nova   Cell # [phone]     CHAD DUNBAR was seen and examined at bedside.   Patient is a 66y old  Male who presents with a chief complaint of Status Epilepticus (09-05-22)      24 hour events/subjective: No major events. He has no pain. No NVD. Appetite fair. Ambulating with assistance. Voiding more urine with lasix.     Standing Inpatient Medications  amLODIPine   Tablet 10 milliGRAM(s) Oral daily  artificial  tears Solution 2 Drop(s) Left EYE every 6 hours  brivaracetam 50 milliGRAM(s) Oral two times a day  carvedilol 12.5 milliGRAM(s) Oral every 12 hours  doxazosin 4 milliGRAM(s) Oral <User Schedule>  epoetin cortney-epbx (RETACRIT) Injectable 08557 Unit(s) SubCutaneous <User Schedule>  finasteride 5 milliGRAM(s) Oral daily  heparin   Injectable 5000 Unit(s) SubCutaneous every 12 hours  insulin glargine Injectable (LANTUS) 5 Unit(s) SubCutaneous at bedtime  insulin lispro (ADMELOG) corrective regimen sliding scale   SubCutaneous three times a day before meals  insulin lispro (ADMELOG) corrective regimen sliding scale   SubCutaneous at bedtime  insulin lispro Injectable (ADMELOG) 10 Unit(s) SubCutaneous before breakfast  insulin lispro Injectable (ADMELOG) 8 Unit(s) SubCutaneous before lunch  insulin lispro Injectable (ADMELOG) 8 Unit(s) SubCutaneous before dinner  levothyroxine 50 MICROGram(s) Oral daily  melatonin 6 milliGRAM(s) Oral at bedtime  multivitamin 1 Tablet(s) Oral daily  mycophenolate mofetil 250 milliGRAM(s) Oral <User Schedule>  nystatin    Suspension 143490 Unit(s) Oral four times a day  pantoprazole    Tablet 40 milliGRAM(s) Oral <User Schedule>  phenytoin   Capsule 200 milliGRAM(s) Oral two times a day  polyethylene glycol 3350 17 Gram(s) Oral daily  predniSONE   Tablet 10 milliGRAM(s) Oral daily  senna 2 Tablet(s) Oral at bedtime  sirolimus 7 milliGRAM(s) Oral <User Schedule>  trimethoprim   80 mG/sulfamethoxazole 400 mG 1 Tablet(s) Oral daily  valGANciclovir 450 milliGRAM(s) Oral <User Schedule>  warfarin 10 milliGRAM(s) Oral once    PRN Inpatient Medications  acetaminophen     Tablet .. 650 milliGRAM(s) Oral every 6 hours PRN  diphenhydrAMINE 25 milliGRAM(s) Oral at bedtime PRN  ondansetron Injectable 4 milliGRAM(s) IV Push once PRN  sodium chloride 0.9% lock flush 10 milliLiter(s) IV Push every 1 hour PRN      VITALS/PHYSICAL EXAM  --------------------------------------------------------------------------------  T(C): 36.6 (09-05-22 @ 09:13), Max: 36.7 (09-04-22 @ 17:00)  HR: 60 (09-05-22 @ 09:13) (60 - 78)  BP: 105/59 (09-05-22 @ 09:13) (105/59 - 156/63)  RR: 20 (09-05-22 @ 09:13) (18 - 20)  SpO2: 94% (09-05-22 @ 09:13) (94% - 98%)      09-04-22 @ 07:01  -  09-05-22 @ 07:00  --------------------------------------------------------  IN: 1300 mL / OUT: 180 mL / NET: 1120 mL    09-05-22 @ 07:01  -  09-05-22 @ 12:35  --------------------------------------------------------  IN: 480 mL / OUT: 20 mL / NET: 460 mL      Physical Exam:  Awake and alert, communicates clearly   Thin, chronically ill appearing , no signs of acute distress  Lungs with bi basilar crackles   Left dialysis catheter tunneled   Abdomen soft, abdominal wall induration appears better, no tenderness  No LE edema       LABS/STUDIES  --------------------------------------------------------------------------------              8.2    5.77  >-----------<  419      [09-05-22 @ 06:20]              26.3     129  |  88  |  94  ----------------------------<  154      [09-05-22 @ 06:20]  5.3   |  19  |  5.62        Ca     9.0     [09-05-22 @ 06:20]      Mg     2.2     [09-05-22 @ 06:20]      Phos  5.9     [09-05-22 @ 06:20]    TPro  6.7  /  Alb  2.5  /  TBili  0.4  /  DBili  0.2  /  AST  40  /  ALT  34  /  AlkPhos  568  [09-05-22 @ 06:20]    PT/INR: PT 16.2 , INR 1.40       [09-05-22 @ 06:20]  PTT: 32.3       [09-05-22 @ 06:20]      Creatinine Trend:  SCr 5.62 [09-05 @ 06:20]  SCr 4.70 [09-04 @ 06:07]  SCr 3.51 [09-03 @ 06:51]  SCr 4.78 [09-02 @ 06:53]  SCr 3.84 [09-01 @ 06:54]      Sirolimus (Rapamycin) Level, Serum: 6.6 ng/mL (09-05 @ 06:20)  Sirolimus (Rapamycin) Level, Serum: 4.1 ng/mL (09-04 @ 06:08)  Sirolimus (Rapamycin) Level, Serum: 4.1 ng/mL (09-03 @ 06:53)  Sirolimus (Rapamycin) Level, Serum: 3.5 ng/mL (09-02 @ 06:47)      CAPILLARY BLOOD GLUCOSE  POCT Blood Glucose.: 193 mg/dL (05 Sep 2022 12:16)  POCT Blood Glucose.: 178 mg/dL (05 Sep 2022 08:22)  POCT Blood Glucose.: 91 mg/dL (04 Sep 2022 22:40)  POCT Blood Glucose.: 86 mg/dL (04 Sep 2022 21:19)  POCT Blood Glucose.: 194 mg/dL (04 Sep 2022 17:28)  POCT Blood Glucose.: 220 mg/dL (04 Sep 2022 12:49)      Urinalysis - [08-16-22 @ 18:49]      Color Yellow / Appearance Slightly Turbid / SG 1.018 / pH 8.5      Gluc 200 mg/dL / Ketone Negative  / Bili Negative / Urobili Negative       Blood Large / Protein >600 / Leuk Est Moderate / Nitrite Positive      RBC 13 / WBC 40 / Hyaline 7 / Gran  / Sq Epi  / Non Sq Epi 4 / Bacteria Moderate

## 2022-09-05 NOTE — PROGRESS NOTE ADULT - ASSESSMENT
66M with h/o DM type II, HTN, CAD s/p CABG in 2020, Afib on Eliquis, CVA in 2019 due to Afib, Seizure d/o (last episode was on 4/8/22), h/o GI bleed in 2/2022 EGD with duodenal ulcer and ESRD on HD s/p R DDRT from DCD donor on 4/21/22 complicated by DGF requiring HD until 4/29/22 who presented with status epilepticus in setting of UTI/antibiotics and sub-therapeutic valproic acid level. Transferred to SICU on 7/25 with signs of sepsis, now with refractory seizures. Last reported seizure on 8/16. Mental status continuing to improve.     Seizure Disorder:  - Neuro/Epilepsy Teams following. Avoid Fycompa 2/2 agitation in past  - 1 min seizure with clonic movements noted on EEG 8/16. No further seizures noted.  EEG 8/16-8/18  - Continue additional anti-seizure medication Briviact 50mg BID with additional 50mg post each HD session  - On Phenytoin and Briviact  - Appreciate neuro recommendations to wean and discontinue Phenytoin and to continue Briviact.  Given prolonged hospital course and current stable regimen, would prefer to make medication adjustments as outpatient.  Discussed with Neuro  - prior MRI head 6/27 with less concerns for PRES, likely ischemic changes  - o/n CTH 8/3 with no acute findings  - Follow up with neuro regarding seizure medications recs for discharge      R DCD DDRT 4/21/22 c/b 2A ACR  - 2A ACR: s/p pulse steroids. Continue Prednisone 10mg QD  - HD/UF as per nephrology for fluid overload/anuria   - 8/14 surveillance BCx negative  - Repeated US from 8/25 did not show any renal artery stenosis, but increased resistive index, consistent with parenchymal disease was noted  - S/P removal of ureteral stent on 7/12  - 8/8 Punch biopsy wound Culture: ngtd. Negative for PTLD  - fungitel negative  - LE studies WNL    RLQ Cellulitis:  - Deferring right psoas muscle/fascia biopsy as wound is not worsening  - Complete course of Keflex today (8/20 - 9/4)     Immunosuppression:   - Belatacept: 6/23-8/1 discontinued  - Sirolimus 7 mg QD- Level reviewed 4.1 today (no changes made)   - Continue MMF at low dose 250mg bid  - Pred 10mg QD  - PPX: Nystatin, bactrim, valcyte (MWF)    DM  - Labile blood glucose  - On Lantus 5U and pre-meal 10/8/8U with ISS coverage  - Fingerstick ac and qhs  - Endo recs appreciated    AFib/R IJ DVT   - Full ac transitioned to coumadin on 8/31 dose daily per INR goal >2  - rate controlled  - SQH 5000u BID for DVT ppx while INR subtherapeutic   - Eliquis with ~40% less efficacy while on fosphenytoin      HTN:  - Amlodipine/Doxazosin. Increase hydralazine to 100 TID.   - off Coreg due to bradycardia    BPH:  - Continue Doxazosin/Proscar   66M with h/o DM type II, HTN, CAD s/p CABG in 2020, Afib on Eliquis, CVA in 2019 due to Afib, Seizure d/o (last episode was on 4/8/22), h/o GI bleed in 2/2022 EGD with duodenal ulcer and ESRD on HD s/p R DDRT from DCD donor on 4/21/22 complicated by DGF requiring HD until 4/29/22 who presented with status epilepticus in setting of UTI/antibiotics and sub-therapeutic valproic acid level. Transferred to SICU on 7/25 with signs of sepsis, now with refractory seizures. Last reported seizure on 8/16. Mental status continuing to improve.     Seizure Disorder:  - Neuro/Epilepsy Teams following. Avoid Fycompa 2/2 agitation in past  - 1 min seizure with clonic movements noted on EEG 8/16. No further seizures noted.  EEG 8/16-8/18  - Continue additional anti-seizure medication Briviact 50mg BID with additional 50mg post each HD session  - On Phenytoin and Briviact  - Appreciate neuro recommendations to wean and discontinue Phenytoin and to continue Briviact.  Given prolonged hospital course and current stable regimen, would prefer to make medication adjustments as outpatient.  Discussed with Neuro  - prior MRI head 6/27 with less concerns for PRES, likely ischemic changes  - o/n CTH 8/3 with no acute findings  - Follow up with neuro regarding seizure medications recs for discharge      R DCD DDRT 4/21/22 c/b 2A ACR  - 2A ACR: s/p pulse steroids. Continue Prednisone 10mg QD  - HD/UF as per nephrology for fluid overload/anuria   - 8/14 surveillance BCx negative  - Repeated US from 8/25 did not show any renal artery stenosis, but increased resistive index, consistent with parenchymal disease was noted  - S/P removal of ureteral stent on 7/12  - 8/8 Punch biopsy wound Culture: ngtd. Negative for PTLD  - Start lasix 80 on non HD days.   - fungitel negative  - Liver US for uptrending alk phos.   - LE studies WNL    RLQ Cellulitis:  - Deferring right psoas muscle/fascia biopsy as wound is not worsening  - Complete course of Keflex today (8/20 - 9/4)     Immunosuppression:   - Belatacept: 6/23-8/1 discontinued  - Sirolimus 7 mg QD- Level reviewed 4.1 today (no changes made)   - Increase cellcept to 500 BID.   - Pred 10mg QD  - PPX: Nystatin, bactrim, valcyte (MWF)    DM  - Labile blood glucose  - On Lantus 5U and pre-meal 10/8/8U with ISS coverage  - Fingerstick ac and qhs  - Endo recs appreciated    AFib/R IJ DVT   - Full ac transitioned to coumadin on 8/31 dose daily per INR goal >2  - rate controlled  - SQH 5000u BID for DVT ppx while INR subtherapeutic   - Eliquis with ~40% less efficacy while on fosphenytoin      HTN:  - Amlodipine/Doxazosin. Coreg 12.5 BID  - Discontinue hydralazine 100 TID    BPH:  - Continue Doxazosin/Proscar

## 2022-09-05 NOTE — PROGRESS NOTE ADULT - SUBJECTIVE AND OBJECTIVE BOX
[facility] [place] [facility]   MD NINA MASON MD, DO SADIE RAMIREZ NP    TEL:  OFFICE: [phone]    From 5pm-7am Answering Service [phone]    -- RENAL FOLLOW UP NOTE ---Date of Service 09-05-22 @ 10:59    Patient is a 66y old  Male who presents with a chief complaint of Status Epilepticus (05 Sep 2022 09:08)      Patient seen and examined at bedside. No chest pain/sob    VITALS:  T(F): 97.8 (09-05-22 @ 09:13), Max: 98.2 (09-04-22 @ 12:35)  HR: 60 (09-05-22 @ 09:13)  BP: 105/59 (09-05-22 @ 09:13)  RR: 20 (09-05-22 @ 09:13)  SpO2: 94% (09-05-22 @ 09:13)  Wt(kg): --    09-04 @ 07:01  -  09-05 @ 07:00  --------------------------------------------------------  IN: 1300 mL / OUT: 180 mL / NET: 1120 mL    09-05 @ 07:01  -  09-05 @ 10:59  --------------------------------------------------------  IN: 240 mL / OUT: 20 mL / NET: 220 mL          PHYSICAL EXAM:  Constitutional: NAD  Neck: No JVD  Respiratory: CTAB, no wheezes, rales or rhonchi  Cardiovascular: S1, S2, RRR  Gastrointestinal: BS+, soft, NT/ND  Extremities: No peripheral edema    Hospital Medications:   MEDICATIONS  (STANDING):  amLODIPine   Tablet 10 milliGRAM(s) Oral daily  artificial  tears Solution 2 Drop(s) Left EYE every 6 hours  brivaracetam 50 milliGRAM(s) Oral two times a day  carvedilol 12.5 milliGRAM(s) Oral every 12 hours  chlorhexidine 2% Cloths 1 Application(s) Topical <User Schedule>  chlorhexidine 4% Liquid 1 Application(s) Topical <User Schedule>  doxazosin 4 milliGRAM(s) Oral <User Schedule>  epoetin cortney-epbx (RETACRIT) Injectable 77254 Unit(s) SubCutaneous <User Schedule>  finasteride 5 milliGRAM(s) Oral daily  heparin   Injectable 5000 Unit(s) SubCutaneous every 12 hours  hydrALAZINE 100 milliGRAM(s) Oral three times a day  insulin glargine Injectable (LANTUS) 5 Unit(s) SubCutaneous at bedtime  insulin lispro (ADMELOG) corrective regimen sliding scale   SubCutaneous three times a day before meals  insulin lispro (ADMELOG) corrective regimen sliding scale   SubCutaneous at bedtime  insulin lispro Injectable (ADMELOG) 10 Unit(s) SubCutaneous before breakfast  insulin lispro Injectable (ADMELOG) 8 Unit(s) SubCutaneous before lunch  insulin lispro Injectable (ADMELOG) 8 Unit(s) SubCutaneous before dinner  levothyroxine 50 MICROGram(s) Oral daily  melatonin 6 milliGRAM(s) Oral at bedtime  multivitamin 1 Tablet(s) Oral daily  mycophenolate mofetil 250 milliGRAM(s) Oral two times a day  nystatin    Suspension 022232 Unit(s) Oral four times a day  pantoprazole    Tablet 40 milliGRAM(s) Oral <User Schedule>  phenytoin   Capsule 200 milliGRAM(s) Oral two times a day  polyethylene glycol 3350 17 Gram(s) Oral daily  predniSONE   Tablet 10 milliGRAM(s) Oral daily  senna 2 Tablet(s) Oral at bedtime  sirolimus 7 milliGRAM(s) Oral <User Schedule>  trimethoprim   80 mG/sulfamethoxazole 400 mG 1 Tablet(s) Oral daily  valGANciclovir 450 milliGRAM(s) Oral <User Schedule>      LABS:  09-05    129<L>  |  88<L>  |  94<H>  ----------------------------<  154<H>  5.3   |  19<L>  |  5.62<H>    Ca    9.0      05 Sep 2022 06:20  Phos  5.9     09-05  Mg     2.2     09-05    TPro  6.7  /  Alb  2.5<L>  /  TBili  0.4  /  DBili  0.2  /  AST  40  /  ALT  34  /  AlkPhos  568<H>  09-05    Creatinine Trend: 5.62 <--, 4.70 <--, 3.51 <--, 4.78 <--, 3.84 <--, 5.10 <--, 3.99 <--    Albumin, Serum: 2.5 g/dL (09-05 @ 06:20)  Phosphorus Level, Serum: 5.9 mg/dL (09-05 @ 06:20)                              8.2    5.77  )-----------( 419      ( 05 Sep 2022 06:20 )             26.3     Urine Studies:  Urinalysis - [08-16-22 @ 18:49]      Color Yellow / Appearance Slightly Turbid / SG 1.018 / pH 8.5      Gluc 200 mg/dL / Ketone Negative  / Bili Negative / Urobili Negative       Blood Large / Protein >600 / Leuk Est Moderate / Nitrite Positive      RBC 13 / WBC 40 / Hyaline 7 / Gran  / Sq Epi  / Non Sq Epi 4 / Bacteria Moderate      Iron 17, TIBC 171, %sat 10      [05-02-22 @ 13:03]  Ferritin 6336      [07-27-22 @ 13:00]  PTH -- (Ca 9.2)      [02-05-22 @ 10:13]   294  HbA1c 6.0      [02-21-20 @ 08:53]  TSH 4.69      [07-06-22 @ 18:36]  Lipid: chol --, , HDL --, LDL --      [07-27-22 @ 13:00]        RADIOLOGY & ADDITIONAL STUDIES:

## 2022-09-06 NOTE — CHART NOTE - NSCHARTNOTESELECT_GEN_ALL_CORE
EEG prelim
Endocrinology/Event Note
Endocrinology/Event Note
Event Note
IR
Intubation/Event Note
NEUROLOGY Resident/Event Note
Neurology
Neurology
Neurology/Event Note
Nutrition Services
POCUS/Event Note
endocrine/Event Note
neurology/Event Note
EEG Prelim
Endocrine/Event Note
Endocrinology/Event Note
Endocrinology/Event Note
Event Note
Hemodialysis/Event Note
Neurology
Nutrition Services
eeg prelim
eeg prelim report
endocrine/Event Note
endocrine/Event Note

## 2022-09-06 NOTE — PROGRESS NOTE ADULT - SUBJECTIVE AND OBJECTIVE BOX
Transplant Surgery - Multidisciplinary Rounds  --------------------------------------------------------------  DDRT (DCD KDPI 82% 1a/1v/1u)   Date: 4/21/2022    Present:   Patient seen and examined with multidisciplinary team including Transplant Surgeon: Dr. Mann, Dr. Tena Transplant Nephrologist: Dr. JONO Lomeli, nephrology fellow, PGY 3 Dr. Chong, transplant pharmacist CELIA Styles and unit RN during am rounds.  Disciplines not in attendance will be notified of the plan.     HPI: 66M with PMH: DM type II, HTN, CAD s/p CABG in 2020, AFib on Eliquis, CVA in 2019 due to Afib, Seizure d/o following CVA, last episode was on 4/8/22, h/o GIB in 2/2022 EGD with duodenal ulcer.  ESRD due to DM was on HD since 2019.   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  Donor was 58 , KDPI 82%, DCD, single vessels and ureter, HLA mismatch 1, 2, 2. No DSA, cPRA 0%. 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 Re-admitted in May for anemia in the setting of large perinephric hematoma s/p evacuation and repair of arterial anastomosis on 5/2/22. Intra operative biopsy showed no rejection, Creatinine ranging ~2mg/dL.    In Rehab:  He was recently dx with klebsiella UTI with Ertapenem - then switched to Levaquin    He also had parainfluenza pneumonia. Was at rehab progressing well.      Hospital course:  - 6/10: transferred from rehab facility for seizure status epilepticus. Intubated for respiratory protection and transferred to MICU.  EEG showed no seizure activity. Neuro consulted.   - 6/11: Extubated. Found to have R pleural effusion. s/p Thoracentesis 1.3L. Eliquis changed to heparin drip.  Post procedure drop in H&H. 5u PRBC, 2 u FFP.   - 6/11 evening: Transferred to SICU from MICU for further care in setting of hypoxia, significant R pleural effusion, anemia and hemodynamic instability  - 6/11 Intubated at 9pm and sedated on Precedex.  CT placed, 600ml blood drained.  1L total overnight, started pressors  - 6/11 Received Protamine for PTT >100 (was on Heparin drip started for AF), 2 u FFP and 5u PRBC total  - 6/13 s/p VATS 2.2L old blood removed; second chest tube placed  - 6/18-19: chest tubes removed  - 6/21: ?PRES vs. chronic ischemic changes on MRI, off Envarsus, switched to Belatacept 6/23 with good graft function  - 6/25: Tx to SICU for refractory seizures, improved with IV antiepileptic regimen  - 7/10: Downgraded from SICU to floor.   - 7/11: OR-->URETERAL STENT REMOVED  - 7/15: ESBL Kleb UTI (50-90K), completed Ertapenem x 3 Days  - 7/25: Tx to SICU for Sepsis/ elevated LFTS  - 7/27: Shiley placed for HD  - 7/28: ongoing fevers -> started Ertapenem/Fluc  - 7/29: HD restarted + 1U PRBC.  IR bx with 2A rejection, started pulse steroids. LFTs have improved  - 8/4: refractory seizures  - 8/8: s/p derm bx with no acute finding  - 8/8: s/p permacath exchange  - 8/16: One seizure on EEG medications changed added brivaracetam  - 8/22: HD, followed by U/S of Transplant kidney  - 8/31 Eliquis changed to coumadin (due to 40% less efficacy while on phenytoin)      Interval Events:  - Last HD 9/5.   - INR 1.74, was given coumadin 10mg.   - BP improved started Coreg 12.5 BID 9/4  - alk phos 524<--- 568<----434<----454<----477<---533  - Abd US pending      Immunosuppression   Induction:  Simulect induction                                         Maintenance immunosuppression: Sirolimus 7mg daily, MMF 250mg BID, Pred 10   Ongoing monitoring for signs of rejection.    Potential Discharge date: TBD  Education:  Medications  Plan of care:  See Below      MEDICATIONS  (STANDING):  amLODIPine   Tablet 10 milliGRAM(s) Oral daily  artificial  tears Solution 2 Drop(s) Left EYE every 6 hours  brivaracetam 50 milliGRAM(s) Oral two times a day  carvedilol 12.5 milliGRAM(s) Oral every 12 hours  chlorhexidine 2% Cloths 1 Application(s) Topical <User Schedule>  chlorhexidine 4% Liquid 1 Application(s) Topical <User Schedule>  doxazosin 4 milliGRAM(s) Oral <User Schedule>  epoetin cortney-epbx (RETACRIT) Injectable 80610 Unit(s) SubCutaneous <User Schedule>  finasteride 5 milliGRAM(s) Oral daily  furosemide    Tablet 80 milliGRAM(s) Oral <User Schedule>  heparin   Injectable 5000 Unit(s) SubCutaneous every 12 hours  insulin glargine Injectable (LANTUS) 5 Unit(s) SubCutaneous at bedtime  insulin lispro (ADMELOG) corrective regimen sliding scale   SubCutaneous three times a day before meals  insulin lispro (ADMELOG) corrective regimen sliding scale   SubCutaneous at bedtime  insulin lispro Injectable (ADMELOG) 10 Unit(s) SubCutaneous before breakfast  insulin lispro Injectable (ADMELOG) 8 Unit(s) SubCutaneous before lunch  insulin lispro Injectable (ADMELOG) 8 Unit(s) SubCutaneous before dinner  levothyroxine 50 MICROGram(s) Oral daily  melatonin 6 milliGRAM(s) Oral at bedtime  multivitamin 1 Tablet(s) Oral daily  mycophenolate mofetil 500 milliGRAM(s) Oral <User Schedule>  nystatin    Suspension 319749 Unit(s) Oral four times a day  pantoprazole    Tablet 40 milliGRAM(s) Oral <User Schedule>  phenytoin   Capsule 200 milliGRAM(s) Oral two times a day  polyethylene glycol 3350 17 Gram(s) Oral daily  predniSONE   Tablet 10 milliGRAM(s) Oral daily  senna 2 Tablet(s) Oral at bedtime  sirolimus 7 milliGRAM(s) Oral <User Schedule>  trimethoprim   80 mG/sulfamethoxazole 400 mG 1 Tablet(s) Oral daily  valGANciclovir 450 milliGRAM(s) Oral <User Schedule>  warfarin 10 milliGRAM(s) Oral at bedtime    MEDICATIONS  (PRN):  acetaminophen     Tablet .. 650 milliGRAM(s) Oral every 6 hours PRN Mild Pain (1 - 3)  diphenhydrAMINE 25 milliGRAM(s) Oral at bedtime PRN Insomnia  ondansetron Injectable 4 milliGRAM(s) IV Push once PRN Nausea and/or Vomiting  sodium chloride 0.9% lock flush 10 milliLiter(s) IV Push every 1 hour PRN Pre/post blood products, medications, blood draw, and to maintain line patency      PAST MEDICAL & SURGICAL HISTORY:  Hypertension      Diabetes      Dyslipidemia      CAD (Coronary Artery Disease)  with Stents in 06/2009 and 6/2019, s/p off-pump C3L on 8/13/20      Hypothyroidism      CVA (cerebral vascular accident)  12/13/19 with residual bilateral weakness      Anemia      PEG (percutaneous endoscopic gastrostomy) status  removed July 2020      Intubation of airway performed without difficulty  Dec 2019  CVA c/b status epilepticus requiring intubation and PEG in 12/2019-      ESRD on dialysis  M/W/F      Seizures  after CVA, last seizure was last week 4/07/22      History of insertion of stent into coronary artery bypass graft  2009 and 2019      S/P CABG x 3  off pump C3L on 8/13/20          Vital Signs Last 24 Hrs  T(C): 36.7 (06 Sep 2022 10:26), Max: 36.8 (05 Sep 2022 15:08)  T(F): 98.1 (06 Sep 2022 10:26), Max: 98.3 (06 Sep 2022 05:00)  HR: 65 (06 Sep 2022 10:26) (63 - 74)  BP: 156/67 (06 Sep 2022 10:26) (135/65 - 174/79)  BP(mean): 96 (05 Sep 2022 20:57) (96 - 96)  RR: 20 (06 Sep 2022 10:26) (18 - 20)  SpO2: 93% (06 Sep 2022 10:26) (93% - 100%)    Parameters below as of 06 Sep 2022 10:26  Patient On (Oxygen Delivery Method): room air    I&O's Summary    05 Sep 2022 07:01  -  06 Sep 2022 07:00  --------------------------------------------------------  IN: 1520 mL / OUT: 2820 mL / NET: -1300 mL                       8.3    6.45  )-----------( 465      ( 06 Sep 2022 06:23 )             26.7     09-06    132<L>  |  91<L>  |  65<H>  ----------------------------<  146<H>  4.2   |  23  |  4.13<H>    Ca    9.0      06 Sep 2022 06:21  Phos  4.4     09-06  Mg     2.2     09-06    TPro  6.6  /  Alb  2.7<L>  /  TBili  0.3  /  DBili  0.2  /  AST  22  /  ALT  28  /  AlkPhos  524<H>  09-06      Review of systems  Gen: No weight changes, fatigue, fevers/chills, weakness  Skin: No rashes  Head/Eyes/Ears/Mouth: No headache; Normal hearing; Normal vision w/o blurriness; No sinus pain/discomfort, sore throat  Respiratory: No dyspnea, cough, wheezing, hemoptysis  CV: No chest pain, PND, orthopnea  GI: Mild abdominal pain at surgical incision site; denies diarrhea, constipation, nausea, vomiting, melena, hematochezia  : No increased frequency, dysuria, hematuria, nocturia  MSK: No joint pain/swelling; no back pain; no edema  Neuro: No dizziness/lightheadedness, weakness, seizures, numbness, tingling  Heme: No easy bruising or bleeding  Endo: No heat/cold intolerance  Psych: No significant nervousness, anxiety, stress, depression  All other systems were reviewed and are negative, except as noted.      PHYSICAL EXAM:  Constitutional: Well developed / well nourished  Neuro: A&O to person/place/time   Eyes: Anicteric, PERRLA  ENMT: nc/at  Neck: supple   Chest: LIJ permcath without exudate/erythema   Respiratory: CTA B/L  Cardiovascular: RRR  Gastrointestinal: Soft, non distended, mild tenderness at the incision site; incision c/d/i; erythema/rash improved   Genitourinary: Voiding spontaneously  Extremities: SCD's in place and working bilaterally   Vascular: Palpable dp pulses bilaterally  Neurological: A&O x3  Skin: no rashes, ulcerations or lesions;  Musculoskeletal: Moving all extremities  Psychiatric: Responsive

## 2022-09-06 NOTE — PROGRESS NOTE ADULT - ASSESSMENT
66M with h/o DM type II, HTN, CAD s/p CABG in 2020, Afib on Eliquis, CVA in 2019 due to Afib, Seizure d/o (last episode was on 4/8/22), h/o GI bleed in 2/2022 EGD with duodenal ulcer and ESRD on HD s/p R DDRT from DCD donor on 4/21/22 complicated by DGF requiring HD until 4/29/22 who presented with status epilepticus in setting of UTI/antibiotics and sub-therapeutic valproic acid level. Transferred to SICU on 7/25 with signs of sepsis, now with refractory seizures. Last reported seizure on 8/16. Mental status continuing to improve.     Seizure Disorder:  - Neuro/Epilepsy Teams following. Avoid Fycompa 2/2 agitation in past  - 1 min seizure with clonic movements noted on EEG 8/16. No further seizures noted.  EEG 8/16-8/18  - Continue additional anti-seizure medication Briviact 50mg BID with additional 50mg post each HD session  - On Phenytoin and Briviact  - Appreciate neuro recommendations to wean and discontinue Phenytoin and to continue Briviact.  Given prolonged hospital course and current stable regimen, would prefer to make medication adjustments as outpatient.  Discussed with Neuro  - prior MRI head 6/27 with less concerns for PRES, likely ischemic changes  - o/n CTH 8/3 with no acute findings  - Follow up with neuro regarding seizure medications recs for discharge      R DCD DDRT 4/21/22 c/b 2A ACR  - 2A ACR: s/p pulse steroids. Continue Prednisone 10mg QD  - HD/UF as per nephrology for fluid overload/anuria   - Lasix 80mg on off HD days  - 8/14 surveillance BCx negative  - Repeated US from 8/25 did not show any renal artery stenosis, but increased resistive index, consistent with parenchymal disease was noted  - S/P removal of ureteral stent on 7/12  - 8/8 Punch biopsy wound Culture: ngtd. Negative for PTLD  - fungitel negative  - Liver US for uptrending alk phos 9/6: Borderline hepatomegaly. No masses seen. Patent portal and hepatic veins.   - LE studies WNL  - Change Midline     RLQ Cellulitis:  - Deferring right psoas muscle/fascia biopsy as wound is not worsening  - Complete course of Keflex (8/20 - 9/4)     Immunosuppression:   - Belatacept: 6/23-8/1 discontinued  - Sirolimus 7 mg QD- Level reviewed 4.1 today (no changes made)   - Cellcept 500 BID.   - Pred 10mg QD  - PPX: Nystatin, bactrim, valcyte (MWF)    DM  - Labile blood glucose  - On Lantus 5U and pre-meal 10/8/8U with ISS coverage  - Fingerstick ac and qhs  - Endo recs appreciated    AFib/R IJ DVT   - Full ac transitioned to coumadin on 8/31 dose daily per INR goal >2  - rate controlled  - SQH 5000u BID for DVT ppx while INR subtherapeutic   - Eliquis with ~40% less efficacy while on fosphenytoin      HTN:  - Amlodipine/Doxazosin. Coreg 12.5 BID    BPH:  - Continue Doxazosin/Proscar    Dispo:  - Plan for acute rehab when medically stable

## 2022-09-06 NOTE — PROGRESS NOTE ADULT - SUBJECTIVE AND OBJECTIVE BOX
[facility]   MD NINA MASON MD, PA KRISTINE SOLTANPOUR, DO INJUNG KO, NP    TEL:  OFFICE: [phone]    From 5pm-7am Answering Service [phone]    -- RENAL FOLLOW UP NOTE ---Date of Service 09-06-22 @ 11:45    Patient is a 66y old  Male who presents with a chief complaint of Status Epilepticus (06 Sep 2022 11:22)      Patient seen and examined at bedside. No chest pain/sob    VITALS:  T(F): 98.1 (09-06-22 @ 10:26), Max: 98.3 (09-06-22 @ 05:00)  HR: 65 (09-06-22 @ 10:26)  BP: 156/67 (09-06-22 @ 10:26)  RR: 20 (09-06-22 @ 10:26)  SpO2: 93% (09-06-22 @ 10:26)  Wt(kg): --    09-05 @ 07:01  -  09-06 @ 07:00  --------------------------------------------------------  IN: 1520 mL / OUT: 2820 mL / NET: -1300 mL          PHYSICAL EXAM:  Constitutional: NAD  Neck: No JVD  Respiratory: CTAB, no wheezes, rales or rhonchi  Cardiovascular: S1, S2, RRR  Gastrointestinal: BS+, soft, NT/ND  Extremities: No peripheral edema    Hospital Medications:   MEDICATIONS  (STANDING):  amLODIPine   Tablet 10 milliGRAM(s) Oral daily  artificial  tears Solution 2 Drop(s) Left EYE every 6 hours  brivaracetam 50 milliGRAM(s) Oral two times a day  carvedilol 12.5 milliGRAM(s) Oral every 12 hours  chlorhexidine 2% Cloths 1 Application(s) Topical <User Schedule>  chlorhexidine 4% Liquid 1 Application(s) Topical <User Schedule>  doxazosin 4 milliGRAM(s) Oral <User Schedule>  epoetin cortney-epbx (RETACRIT) Injectable 01287 Unit(s) SubCutaneous <User Schedule>  finasteride 5 milliGRAM(s) Oral daily  furosemide    Tablet 80 milliGRAM(s) Oral <User Schedule>  heparin   Injectable 5000 Unit(s) SubCutaneous every 12 hours  insulin glargine Injectable (LANTUS) 5 Unit(s) SubCutaneous at bedtime  insulin lispro (ADMELOG) corrective regimen sliding scale   SubCutaneous three times a day before meals  insulin lispro (ADMELOG) corrective regimen sliding scale   SubCutaneous at bedtime  insulin lispro Injectable (ADMELOG) 10 Unit(s) SubCutaneous before breakfast  insulin lispro Injectable (ADMELOG) 8 Unit(s) SubCutaneous before lunch  insulin lispro Injectable (ADMELOG) 8 Unit(s) SubCutaneous before dinner  levothyroxine 50 MICROGram(s) Oral daily  melatonin 6 milliGRAM(s) Oral at bedtime  multivitamin 1 Tablet(s) Oral daily  mycophenolate mofetil 500 milliGRAM(s) Oral <User Schedule>  nystatin    Suspension 152549 Unit(s) Oral four times a day  pantoprazole    Tablet 40 milliGRAM(s) Oral <User Schedule>  phenytoin   Capsule 200 milliGRAM(s) Oral two times a day  polyethylene glycol 3350 17 Gram(s) Oral daily  predniSONE   Tablet 10 milliGRAM(s) Oral daily  senna 2 Tablet(s) Oral at bedtime  sirolimus 7 milliGRAM(s) Oral <User Schedule>  trimethoprim   80 mG/sulfamethoxazole 400 mG 1 Tablet(s) Oral daily  valGANciclovir 450 milliGRAM(s) Oral <User Schedule>  warfarin 10 milliGRAM(s) Oral at bedtime      LABS:  09-06    132<L>  |  91<L>  |  65<H>  ----------------------------<  146<H>  4.2   |  23  |  4.13<H>    Ca    9.0      06 Sep 2022 06:21  Phos  4.4     09-06  Mg     2.2     09-06    TPro  6.6  /  Alb  2.7<L>  /  TBili  0.3  /  DBili  0.2  /  AST  22  /  ALT  28  /  AlkPhos  524<H>  09-06    Creatinine Trend: 4.13 <--, 5.62 <--, 4.70 <--, 3.51 <--, 4.78 <--, 3.84 <--, 5.10 <--    Phosphorus Level, Serum: 4.4 mg/dL (09-06 @ 06:21)  Albumin, Serum: 2.7 g/dL (09-06 @ 06:21)                              8.3    6.45  )-----------( 465      ( 06 Sep 2022 06:23 )             26.7     Urine Studies:  Urinalysis - [08-16-22 @ 18:49]      Color Yellow / Appearance Slightly Turbid / SG 1.018 / pH 8.5      Gluc 200 mg/dL / Ketone Negative  / Bili Negative / Urobili Negative       Blood Large / Protein >600 / Leuk Est Moderate / Nitrite Positive      RBC 13 / WBC 40 / Hyaline 7 / Gran  / Sq Epi  / Non Sq Epi 4 / Bacteria Moderate      Iron 17, TIBC 171, %sat 10      [05-02-22 @ 13:03]  Ferritin 6336      [07-27-22 @ 13:00]  PTH -- (Ca 9.2)      [02-05-22 @ 10:13]   294  HbA1c 6.0      [02-21-20 @ 08:53]  TSH 4.69      [07-06-22 @ 18:36]  Lipid: chol --, , HDL --, LDL --      [07-27-22 @ 13:00]        RADIOLOGY & ADDITIONAL STUDIES:

## 2022-09-06 NOTE — PROGRESS NOTE ADULT - PROBLEM SELECTOR PLAN 3
Currently on Coreg 12.5mg po bid, Amlodipine 10, and Doxazosin 4mg. BP well controlled. Continue to monitor.

## 2022-09-06 NOTE — PROGRESS NOTE ADULT - ATTENDING COMMENTS
66 yr old man with h/o DM, HTN, CAD s/p CABG, Afib on Eliquis, CVA in 2019, Seizure d/o s/p DDRT from DCD donor on 4/21/22.   Complicated post transplant course - initial DGF eventually recovered. Klebsiella UTI/Sepsis rx with antibiotics, status epilepticus, Envarsus discontinued for neurotoxicity/seizures and he was started on belatacept -> complicated by ANA due to rejection Grade 2a treated with pulse steroids, Currently HD dependent.     1.S/p DDRT from DCD donor on 4/21/22 - Poor allograft function due to Grade 2a rejection (biopsy 7/29) , c4d negative, No DSA. S/p pulse solumedrol completed 8/3. Thymo not given due to frailty .Remains dialysis dependent. Oliguric. Transplant US on 8/25 homogenous flow, no hydro, 1.9 x 1.5 cm collection, no significant TRAS. Last HD was on 9/5. on Lasix 80mg po on non dialysis days   2.Immunosuppression  - MMF on hold due to recurrent infections, Envarsus d/darren for seizures and poor mental status and changed to Belatacept. Currently sirolimus 7mg daily and prednisone 10mg po daily and Cellcept  500mg po bid .Infection prophylaxis - Nystatin, Bactrim, Valcyte   3.Recurrent ESBL Klebsiella UTI - 7/15 and 7/25. Completed ertapenem on 8/3   4.Anemia s/p PRBC 1 unit 8/26. Continue epoetin 10,000 units sq TIW with HD   5.Hypertension - on Coreg 12.5mg po bid , Amlodipine 10 mg po daily and Doxazosin 4mg bid.   6. Diabetes - Controlled, Continue Lantus 5 and Admelog pre meal and sliding scale   7. Seizure d/o - on Briviact and phenytoin. Neurology following.   8. A.fib - Eliquis d/darren due to drug interactions. On Coumadin 10mg po tonight. f/u INR    D/c planning to rehab in progress.

## 2022-09-06 NOTE — PROGRESS NOTE ADULT - PROBLEM SELECTOR PLAN 4
Anemia in the setting of renal disease. Continue with EPO 10K untis three times per week with HD. Hg below goal at this time. Monitor. Hg.     If any questions, please feel free to contact me     Steven Hyatt  Nephrology Fellow  [facility] Pager: [phone].

## 2022-09-06 NOTE — PROGRESS NOTE ADULT - PROBLEM SELECTOR PLAN 1
Pt. 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 No DSA.     s/p pulse dose steroids. Thymo was not given to treat the rejection as he is too frail and at increased risk for infections. Now with failed allograft function, Remains anuric and is dialysis dependent. Pt underwent IR guided HD catheter exchange on 8/8/22. Last HD done on 9/5/22. Tolerated well. Pt. underwent L IJ tunneled HD catheter placement on 8/30/22.      Labs reviewed. Pt. is still anuric. Plan for HD tomorrow. Monitor for labs. Dose meds as per HD. Pt. 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 No DSA.     s/p pulse dose steroids. Thymo was not given to treat the rejection as he is too frail and at increased risk for infections. Now with failed allograft function, Remains anuric and is dialysis dependent. Pt underwent IR guided HD catheter exchange on 8/8/22. Last HD done on 9/5/22. Tolerated well. Pt. underwent L IJ tunneled HD catheter placement on 8/30/22.      Labs reviewed. Pt. is still anuric. Plan for HD tomorrow. Monitor for labs. Dose meds as per HD.  Patient in need for midline. No renal objection.

## 2022-09-06 NOTE — PROGRESS NOTE ADULT - NUTRITIONAL ASSESSMENT
This patient has been assessed with a concern for Malnutrition and has been determined to have a diagnosis/diagnoses of Severe protein-calorie malnutrition.    This patient is being managed with:   Diet Consistent Carbohydrate w/Evening Snack-  1000mL Fluid Restriction ([id number])  No Concentrated Phosphorus  Supplement Feeding Modality:  Oral  Nepro Cans or Servings Per Day:  3       Frequency:  Daily  Entered: Aug 29 2022  4:09PM

## 2022-09-06 NOTE — PROGRESS NOTE ADULT - SUBJECTIVE AND OBJECTIVE BOX
[facility] DIVISION OF KIDNEY DISEASES AND HYPERTENSION -- FOLLOW UP NOTE  --------------------------------------------------------------------------------    HPI: 66y old  Male h/o DM type II, HTN, CAD s/p CABG in 2020, Afib on Eliquis, CVA in 2019 due to Afib, Hypothyroidism, Seizure d/o, h/o GI bleed in 2/2022 EGD with duodenal ulcer and ESRD on HD s/p R DDRT from DCD donor on 4/21/22 complicated by DGF requiring HD until 4/29/22, later had good graft function who was readmitted with status epilepticus in setting of UTI/antibiotics and sub-therapeutic valproic acid level.  Envarsus was discontinued for seizures and poor mental status and he was started on belatacept. Subsequent ANA due to rejection. 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 Received pulse steroids (Solumedrol 500 on 7/30 -> 250 on 7/31). Did not receive thymo due to frailty. Currently HD dependent.     Pt. seen and examined at bedside. Pt. denies any chest pain. shortness of breath, nausea, vomiting, or abdominal pain. Last HD on 9/5/22. Tolerated well. Underwent L IJ Tunneled HD catheter placement on 8/30/22.       Standing Inpatient Medications  amLODIPine   Tablet 10 milliGRAM(s) Oral daily  artificial  tears Solution 2 Drop(s) Left EYE every 6 hours  brivaracetam 50 milliGRAM(s) Oral two times a day  carvedilol 12.5 milliGRAM(s) Oral every 12 hours  chlorhexidine 2% Cloths 1 Application(s) Topical <User Schedule>  chlorhexidine 4% Liquid 1 Application(s) Topical <User Schedule>  doxazosin 4 milliGRAM(s) Oral <User Schedule>  epoetin cortney-epbx (RETACRIT) Injectable 10684 Unit(s) SubCutaneous <User Schedule>  finasteride 5 milliGRAM(s) Oral daily  furosemide    Tablet 80 milliGRAM(s) Oral <User Schedule>  heparin   Injectable 5000 Unit(s) SubCutaneous every 12 hours  insulin glargine Injectable (LANTUS) 5 Unit(s) SubCutaneous at bedtime  insulin lispro (ADMELOG) corrective regimen sliding scale   SubCutaneous three times a day before meals  insulin lispro (ADMELOG) corrective regimen sliding scale   SubCutaneous at bedtime  insulin lispro Injectable (ADMELOG) 10 Unit(s) SubCutaneous before breakfast  insulin lispro Injectable (ADMELOG) 8 Unit(s) SubCutaneous before lunch  insulin lispro Injectable (ADMELOG) 8 Unit(s) SubCutaneous before dinner  levothyroxine 50 MICROGram(s) Oral daily  melatonin 6 milliGRAM(s) Oral at bedtime  multivitamin 1 Tablet(s) Oral daily  mycophenolate mofetil 500 milliGRAM(s) Oral <User Schedule>  nystatin    Suspension 031395 Unit(s) Oral four times a day  pantoprazole    Tablet 40 milliGRAM(s) Oral <User Schedule>  phenytoin   Capsule 200 milliGRAM(s) Oral two times a day  polyethylene glycol 3350 17 Gram(s) Oral daily  predniSONE   Tablet 10 milliGRAM(s) Oral daily  senna 2 Tablet(s) Oral at bedtime  sirolimus 7 milliGRAM(s) Oral <User Schedule>  trimethoprim   80 mG/sulfamethoxazole 400 mG 1 Tablet(s) Oral daily  valGANciclovir 450 milliGRAM(s) Oral <User Schedule>  warfarin 10 milliGRAM(s) Oral at bedtime    PRN Inpatient Medications  acetaminophen     Tablet .. 650 milliGRAM(s) Oral every 6 hours PRN  diphenhydrAMINE 25 milliGRAM(s) Oral at bedtime PRN  ondansetron Injectable 4 milliGRAM(s) IV Push once PRN  sodium chloride 0.9% lock flush 10 milliLiter(s) IV Push every 1 hour PRN    ROS:   ALl negative except as mentioned above.       VITALS/PHYSICAL EXAM  --------------------------------------------------------------------------------  T(C): 36.7 (09-06-22 @ 10:26), Max: 36.8 (09-05-22 @ 15:08)  HR: 65 (09-06-22 @ 10:26) (63 - 74)  BP: 156/67 (09-06-22 @ 10:26) (135/65 - 174/79)  RR: 20 (09-06-22 @ 10:26) (18 - 20)  SpO2: 93% (09-06-22 @ 10:26) (93% - 100%)  Wt(kg): --        09-05-22 @ 07:01  -  09-06-22 @ 07:00  --------------------------------------------------------  IN: 1520 mL / OUT: 2820 mL / NET: -1300 mL      Physical Exam:  	Gen: NAD   	HEENT: PERRL, MMM   	Pulm: CTA B/L, no crackles   	CV: RRR, S1S2+  	Abd: +BS, soft, RLQ with erythema, no tenderness               Transplant: No tenderness, swelling  	: No suprapubic tenderness  	MSK: trace edema   	Psych: Normal affect and mood  	Skin: Warm              Access: Left IJ tunneled HD catheter +      LABS/STUDIES  --------------------------------------------------------------------------------              8.3    6.45  >-----------<  465      [09-06-22 @ 06:23]              26.7     132  |  91  |  65  ----------------------------<  146      [09-06-22 @ 06:21]  4.2   |  23  |  4.13        Ca     9.0     [09-06-22 @ 06:21]      Mg     2.2     [09-06-22 @ 06:21]      Phos  4.4     [09-06-22 @ 06:21]    TPro  6.6  /  Alb  2.7  /  TBili  0.3  /  DBili  0.2  /  AST  22  /  ALT  28  /  AlkPhos  524  [09-06-22 @ 06:21]    PT/INR: PT 20.3 , INR 1.74       [09-06-22 @ 06:23]  PTT: 34.5       [09-06-22 @ 06:23]      Creatinine Trend:  SCr 4.13 [09-06 @ 06:21]  SCr 5.62 [09-05 @ 06:20]  SCr 4.70 [09-04 @ 06:07]  SCr 3.51 [09-03 @ 06:51]  SCr 4.78 [09-02 @ 06:53]        Sirolimus (Rapamycin) Level, Serum: 6.6 ng/mL (09-05 @ 06:20)  Sirolimus (Rapamycin) Level, Serum: 4.1 ng/mL (09-04 @ 06:08)  Sirolimus (Rapamycin) Level, Serum: 4.1 ng/mL (09-03 @ 06:53)  Sirolimus (Rapamycin) Level, Serum: 3.5 ng/mL (09-02 @ 06:47)          CAPILLARY BLOOD GLUCOSE      POCT Blood Glucose.: 150 mg/dL (06 Sep 2022 07:59)  POCT Blood Glucose.: 219 mg/dL (05 Sep 2022 21:19)  POCT Blood Glucose.: 149 mg/dL (05 Sep 2022 17:19)  POCT Blood Glucose.: 193 mg/dL (05 Sep 2022 12:16)      Urinalysis - [08-16-22 @ 18:49]      Color Yellow / Appearance Slightly Turbid / SG 1.018 / pH 8.5      Gluc 200 mg/dL / Ketone Negative  / Bili Negative / Urobili Negative       Blood Large / Protein >600 / Leuk Est Moderate / Nitrite Positive      RBC 13 / WBC 40 / Hyaline 7 / Gran  / Sq Epi  / Non Sq Epi 4 / Bacteria Moderate      Iron 17, TIBC 171, %sat 10      [05-02-22 @ 13:03]  Ferritin 6336      [07-27-22 @ 13:00]  PTH -- (Ca 9.2)      [02-05-22 @ 10:13]   294  HbA1c 6.0      [02-21-20 @ 08:53]  TSH 4.69      [07-06-22 @ 18:36]  Lipid: chol --, , HDL --, LDL --      [07-27-22 @ 13:00]

## 2022-09-06 NOTE — PROGRESS NOTE ADULT - NS ATTEND AMEND GEN_ALL_CORE FT
making slightly more urine. cont lasix on non-HD days  cont coumadin for IJ thrombus  Immunosuppression management - Cont sirolimus 7mg daily, prednisone 10 mg daily; cellcept 500mg bid

## 2022-09-06 NOTE — CHART NOTE - NSCHARTNOTEFT_GEN_A_CORE
Chart reviewed & discussed with transplant nephrology; ok to place bedside midline .    Jihan Dominguez NP  Available on Teams

## 2022-09-06 NOTE — PROGRESS NOTE ADULT - PROBLEM SELECTOR PLAN 2
IS meds- was on Belatacept (started after rejection). Now on Sirolimus 7mg daily (goal level 4-6) and prednisone 10mg daily. Cellcept started on 9/3, currently on 500mg BID.

## 2022-09-06 NOTE — PROGRESS NOTE ADULT - ASSESSMENT
66 yr old Male with PMHx ESRD s/p permacath removal 5/10/22 s/p Rt DDRT 4/21/22,  CVA (2019), Afib on apixaban, seizure activity, CAD s/p stents, CABG (2020), HTN, HLD, DM on insulin, gastric/duodenal ulcer, recent hospitalization 4/28/22 -5/10/22 with weakness/anemia found to have perinephric hematoma requiring evacuation and repair of bleeding arterial anastomosis. Curently being treated for UTI with Levaquin Today after developing multiple sz episodes refractory to 5 mg versed IM (EMS) and 2 mg ativan IV in E.D. concerning for status epilepticus requiring intubation for hypoxic respiratory  failure. MICU Consult was called for hypoxic respiratory failure secondary to status epilepticus.  Nephrology consulted for renal failure.     A/P  DCD KTX on 04/21/22  Course complicated by DGF, was on HD until 4/29/22. Readmitted in May for anemia in the setting of large perinephric hematoma s/p evacuation and repair of arterial anastomosis on 5/02/22. Intra operative biopsy showed no rejection.  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   Now with failed allograft function, Remains dialysis dependent.   continue Immunosuppression per transplant team  s/p Perma cath placement 08/30/22   last HD 09/04/22-   RRT per transplant team   transplant team on board   monitor BMP, U/O     Immunosuppression per transplant team  - Belatacept: 6/23-8/1/2022 discontinued  - on Sirolimus since 8/1/2022 based on level,  Prednisone 10 mg QD 8/16  Cellcept 250 BID on hold   - PPX:  Nystatin, bactrim, valcyte (MWF)      HTN   BP acceptable   carvedilol added 09/04/22   monitor BP closely

## 2022-09-07 NOTE — PROGRESS NOTE ADULT - SUBJECTIVE AND OBJECTIVE BOX
DIABETES FOLLOW UP NOTE: Saw pt earlier today    Chief Complaint: Endocrine consult requested for management of T2DM    INTERVAL HX: Pt stable, reports tolerating POs with BG levels variable depending on PO intake. Noted FBG in 80s today per POC and 50s per BMP> pt denies any s/sx of hypoglycemia. Pt/wife stated he had breakfast this am but premeal insulin was held with rebound hyperglycemia ac lunch. Sleepy  at time of visit so didn't answer most questions. Remains on HD and on stable Prednisone 10mg daily.          Review of Systems:  General: As above  Unable    Allergies    No Known Allergies    Intolerances      MEDICATIONS:  insulin glargine Injectable (LANTUS) 3 Unit(s) SubCutaneous at bedtime  insulin lispro (ADMELOG) corrective regimen sliding scale   SubCutaneous three times a day before meals  insulin lispro (ADMELOG) corrective regimen sliding scale   SubCutaneous at bedtime  insulin lispro Injectable (ADMELOG) 10 Unit(s) SubCutaneous before breakfast  insulin lispro Injectable (ADMELOG) 8 Unit(s) SubCutaneous before lunch  insulin lispro Injectable (ADMELOG) 8 Unit(s) SubCutaneous before dinner  levothyroxine 50 MICROGram(s) Oral daily  predniSONE   Tablet 10 milliGRAM(s) Oral daily  sirolimus 7 milliGRAM(s) Oral <User Schedule>  trimethoprim   80 mG/sulfamethoxazole 400 mG 1 Tablet(s) Oral daily  valGANciclovir 450 milliGRAM(s) Oral <User Schedule>        PHYSICAL EXAM:  VITALS: T(C): 36.4 (09-07-22 @ 17:00)  T(F): 97.5 (09-07-22 @ 17:00), Max: 99.7 (09-06-22 @ 21:29)  HR: 66 (09-07-22 @ 17:00) (59 - 74)  BP: 179/82 (09-07-22 @ 17:00) (114/59 - 179/82)  RR:  (18 - 20)  SpO2:  (95% - 100%)  Wt(kg): --  GENERAL: Thin male laying in bed in NAD  Abdomen: Soft, nontender, non distended  Extremities: Warm, no edema in all 4 exts  NEURO: Sleepy today. Only stated he had breakfast> wife at bedside confirmed this    LABS:  POCT Blood Glucose.: 235 mg/dL (09-07-22 @ 12:34)  POCT Blood Glucose.: 107 mg/dL (09-07-22 @ 08:22)  POCT Blood Glucose.: 82 mg/dL (09-07-22 @ 07:36)  POCT Blood Glucose.: 237 mg/dL (09-06-22 @ 21:29)  POCT Blood Glucose.: 365 mg/dL (09-06-22 @ 17:04)  POCT Blood Glucose.: 249 mg/dL (09-06-22 @ 12:21)  POCT Blood Glucose.: 150 mg/dL (09-06-22 @ 07:59)  POCT Blood Glucose.: 219 mg/dL (09-05-22 @ 21:19)  POCT Blood Glucose.: 149 mg/dL (09-05-22 @ 17:19)  POCT Blood Glucose.: 193 mg/dL (09-05-22 @ 12:16)  POCT Blood Glucose.: 178 mg/dL (09-05-22 @ 08:22)  POCT Blood Glucose.: 91 mg/dL (09-04-22 @ 22:40)  POCT Blood Glucose.: 86 mg/dL (09-04-22 @ 21:19)  POCT Blood Glucose.: 194 mg/dL (09-04-22 @ 17:28)                            8.0    6.30  )-----------( 442      ( 07 Sep 2022 06:30 )             26.1       09-07    132<L>  |  90<L>  |  85<H>  ----------------------------<  52<LL>  4.6   |  22  |  5.03<H>    eGFR: 12<L>    Ca    8.6      09-07  Mg     2.1     09-07  Phos  5.7     09-07    TPro  6.6  /  Alb  2.9<L>  /  TBili  0.3  /  DBili  0.2  /  AST  22  /  ALT  27  /  AlkPhos  527<H>  09-07      A1C with Estimated Average Glucose Result: 6.4 % (09-04-22 @ 06:08)  A1C with Estimated Average Glucose Result: 6.0 % (06-30-22 @ 05:49)      Estimated Average Glucose: 137 mg/dL (09-04-22 @ 06:08)  Estimated Average Glucose: 126 mg/dL (06-30-22 @ 05:49)        07-27 Chol -- Direct LDL -- LDL calculated -- HDL -- Trig 118

## 2022-09-07 NOTE — PROGRESS NOTE ADULT - NUTRITIONAL ASSESSMENT
Diet, Consistent Carbohydrate w/Evening Snack:   1000mL Fluid Restriction ([id number])  No Concentrated Phosphorus  Supplement Feeding Modality:  Oral  Nepro Cans or Servings Per Day:  3       Frequency:  Daily (08-29-22 @ 16:09) [Active]      Please see RD assessment and/or follow up.  Managed by primary team as well

## 2022-09-07 NOTE — PROGRESS NOTE ADULT - ASSESSMENT
66 y/  M with Type 2 DM> unknown control due to skewed A1C 6.6% secondary to chronic anemia and s/p blood tx. On basal/bolus insulin therapy PTA. DM c/b ESRD s/p DDRT on 3/21, CVA, CAD s/p CABG, Afib on apixaban, seizure activity, HTN, HLD, h/o GI bleed in 2/2022 EGD with duodenal ulcer, discharged to [facility] rehab center after prior hospitalization, now re-admitted from [facility] for seizures c/f status epilepticus in setting of UTI. Endocrine consulted for steroid induced hyperglycemia.  Prolonged hospital course w/ ICU stay, previously intubated/extubated, previous seizures. S/p ureteral stent removal 7/12/22. 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 Remains on steroid. BG values variable with fasting hypoglycemia per BMP today without symptoms. Will decrease Lantus insulin to keep BG goal (100-200mg/dl).  Spoke to RN and team NP regarding order to administer 50% of premeal insulin dose if pt eats and receives Prednisone.

## 2022-09-07 NOTE — PROVIDER CONTACT NOTE (CRITICAL VALUE NOTIFICATION) - ASSESSMENT
VS as per flowsheet
pt A&OX3, confused to situation, VSS as charted
glucose 49 in BMP, FS 60,64
PT awake alert with no complaints, no signs of distress. POCT glucose as charted. Pt asymptomatic.
patient hgb 7.0. patient stable, alert and oriented to self and location, no s/s of bleeding assessed
axillary

## 2022-09-07 NOTE — PROGRESS NOTE ADULT - ASSESSMENT
66 yr old Male with PMHx ESRD s/p permacath removal 5/10/22 s/p Rt DDRT 4/21/22,  CVA (2019), Afib on apixaban, seizure activity, CAD s/p stents, CABG (2020), HTN, HLD, DM on insulin, gastric/duodenal ulcer, recent hospitalization 4/28/22 -5/10/22 with weakness/anemia found to have perinephric hematoma requiring evacuation and repair of bleeding arterial anastomosis. Curently being treated for UTI with Levaquin Today after developing multiple sz episodes refractory to 5 mg versed IM (EMS) and 2 mg ativan IV in E.D. concerning for status epilepticus requiring intubation for hypoxic respiratory  failure. MICU Consult was called for hypoxic respiratory failure secondary to status epilepticus.  Nephrology consulted for renal failure.     A/P  DCD KTX on 04/21/22  Course complicated by DGF, was on HD until 4/29/22. Readmitted in May for anemia in the setting of large perinephric hematoma s/p evacuation and repair of arterial anastomosis on 5/02/22. Intra operative biopsy showed no rejection.  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   Now with failed allograft function, Remains dialysis dependent.   continue Immunosuppression per transplant team  s/p Perma cath placement 08/30/22   HD today -   RRT per transplant team   Avoid PICC line , suggests to put [place] catheter if needed   transplant team on board   monitor BMP, U/O     Immunosuppression per transplant team  - Belatacept: 6/23-8/1/2022 discontinued  - on Sirolimus since 8/1/2022 based on level,  Prednisone 10 mg QD 8/16  Cellcept 250 BID on hold   - PPX:  Nystatin, bactrim, valcyte (MWF)      HTN   BP suboptimal   HD with UF today   carvedilol added 09/04/22   monitor BP closely after HD

## 2022-09-07 NOTE — PROGRESS NOTE ADULT - SUBJECTIVE AND OBJECTIVE BOX
Transplant Surgery - Multidisciplinary Rounds  --------------------------------------------------------------  DDRT (DCD KDPI 82% 1a/1v/1u)   Date: 4/21/2022    Present:   Patient seen and examined with multidisciplinary team including Transplant Surgeon: Dr. Mann,  Transplant Nephrologist: Dr. JONO Lomeli, nephrology fellow, PGY 3 Dr. Chong, transplant NP Altagracia Montelongo and unit RN during am rounds.  Disciplines not in attendance will be notified of the plan.     HPI: 66M with PMH: DM type II, HTN, CAD s/p CABG in 2020, AFib on Eliquis, CVA in 2019 due to Afib, Seizure d/o following CVA, last episode was on 4/8/22, h/o GIB in 2/2022 EGD with duodenal ulcer.  ESRD due to DM was on HD since 2019.   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  Donor was 58 , KDPI 82%, DCD, single vessels and ureter, HLA mismatch 1, 2, 2. No DSA, cPRA 0%. 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 Re-admitted in May for anemia in the setting of large perinephric hematoma s/p evacuation and repair of arterial anastomosis on 5/2/22. Intra operative biopsy showed no rejection, Creatinine ranging ~2mg/dL.    In Rehab:  He was recently dx with klebsiella UTI with Ertapenem - then switched to Levaquin    He also had parainfluenza pneumonia. Was at rehab progressing well.      Hospital course:  - 6/10: transferred from rehab facility for seizure status epilepticus. Intubated for respiratory protection and transferred to MICU.  EEG showed no seizure activity. Neuro consulted.   - 6/11: Extubated. Found to have R pleural effusion. s/p Thoracentesis 1.3L. Eliquis changed to heparin drip.  Post procedure drop in H&H. 5u PRBC, 2 u FFP.   - 6/11 evening: Transferred to SICU from MICU for further care in setting of hypoxia, significant R pleural effusion, anemia and hemodynamic instability  - 6/11 Intubated at 9pm and sedated on Precedex.  CT placed, 600ml blood drained.  1L total overnight, started pressors  - 6/11 Received Protamine for PTT >100 (was on Heparin drip started for AF), 2 u FFP and 5u PRBC total  - 6/13 s/p VATS 2.2L old blood removed; second chest tube placed  - 6/18-19: chest tubes removed  - 6/21: ?PRES vs. chronic ischemic changes on MRI, off Envarsus, switched to Belatacept 6/23 with good graft function  - 6/25: Tx to SICU for refractory seizures, improved with IV antiepileptic regimen  - 7/10: Downgraded from SICU to floor.   - 7/11: OR-->URETERAL STENT REMOVED  - 7/15: ESBL Kleb UTI (50-90K), completed Ertapenem x 3 Days  - 7/25: Tx to SICU for Sepsis/ elevated LFTS  - 7/27: Shiley placed for HD  - 7/28: ongoing fevers -> started Ertapenem/Fluc  - 7/29: HD restarted + 1U PRBC.  IR bx with 2A rejection, started pulse steroids. LFTs have improved  - 8/4: refractory seizures  - 8/8: s/p derm bx with no acute finding  - 8/8: s/p permacath exchange  - 8/16: One seizure on EEG medications changed added brivaracetam  - 8/22: HD, followed by U/S of Transplant kidney  - 8/31 Eliquis changed to coumadin (due to 40% less efficacy while on phenytoin)      Interval Events:  - Afebrile, VSS,   - remains seizure free  - INR 2.46 from 1.74    - Last HD 9/5.   - Alk phos stable 527 <-- 524 <--434  - Abd US done     Immunosuppression   Induction:  Simulect induction                                         Maintenance immunosuppression: Sirolimus 7mg daily, MMF 250mg BID, Pred 10   Ongoing monitoring for signs of rejection.    Potential Discharge date: TBD  Education:  Medications  Plan of care:  See Below      MEDICATIONS  (STANDING):  amLODIPine   Tablet 10 milliGRAM(s) Oral daily  artificial  tears Solution 2 Drop(s) Left EYE every 6 hours  brivaracetam 50 milliGRAM(s) Oral two times a day  carvedilol 12.5 milliGRAM(s) Oral every 12 hours  chlorhexidine 2% Cloths 1 Application(s) Topical <User Schedule>  chlorhexidine 4% Liquid 1 Application(s) Topical <User Schedule>  doxazosin 4 milliGRAM(s) Oral <User Schedule>  epoetin cortney-epbx (RETACRIT) Injectable 19426 Unit(s) SubCutaneous <User Schedule>  finasteride 5 milliGRAM(s) Oral daily  furosemide    Tablet 80 milliGRAM(s) Oral <User Schedule>  heparin   Injectable 5000 Unit(s) SubCutaneous every 12 hours  insulin glargine Injectable (LANTUS) 5 Unit(s) SubCutaneous at bedtime  insulin lispro (ADMELOG) corrective regimen sliding scale   SubCutaneous three times a day before meals  insulin lispro (ADMELOG) corrective regimen sliding scale   SubCutaneous at bedtime  insulin lispro Injectable (ADMELOG) 10 Unit(s) SubCutaneous before breakfast  insulin lispro Injectable (ADMELOG) 8 Unit(s) SubCutaneous before lunch  insulin lispro Injectable (ADMELOG) 8 Unit(s) SubCutaneous before dinner  levothyroxine 50 MICROGram(s) Oral daily  melatonin 6 milliGRAM(s) Oral at bedtime  multivitamin 1 Tablet(s) Oral daily  mycophenolate mofetil 500 milliGRAM(s) Oral <User Schedule>  nystatin    Suspension 274535 Unit(s) Oral four times a day  pantoprazole    Tablet 40 milliGRAM(s) Oral <User Schedule>  phenytoin   Capsule 200 milliGRAM(s) Oral two times a day  polyethylene glycol 3350 17 Gram(s) Oral daily  predniSONE   Tablet 10 milliGRAM(s) Oral daily  senna 2 Tablet(s) Oral at bedtime  sirolimus 7 milliGRAM(s) Oral <User Schedule>  trimethoprim   80 mG/sulfamethoxazole 400 mG 1 Tablet(s) Oral daily  valGANciclovir 450 milliGRAM(s) Oral <User Schedule>  warfarin 10 milliGRAM(s) Oral at bedtime    MEDICATIONS  (PRN):  acetaminophen     Tablet .. 650 milliGRAM(s) Oral every 6 hours PRN Mild Pain (1 - 3)  diphenhydrAMINE 25 milliGRAM(s) Oral at bedtime PRN Insomnia  ondansetron Injectable 4 milliGRAM(s) IV Push once PRN Nausea and/or Vomiting  sodium chloride 0.9% lock flush 10 milliLiter(s) IV Push every 1 hour PRN Pre/post blood products, medications, blood draw, and to maintain line patency      PAST MEDICAL & SURGICAL HISTORY:  Hypertension      Diabetes      Dyslipidemia      CAD (Coronary Artery Disease)  with Stents in 06/2009 and 6/2019, s/p off-pump C3L on 8/13/20      Hypothyroidism      CVA (cerebral vascular accident)  12/13/19 with residual bilateral weakness      Anemia      PEG (percutaneous endoscopic gastrostomy) status  removed July 2020      Intubation of airway performed without difficulty  Dec 2019  CVA c/b status epilepticus requiring intubation and PEG in 12/2019-      ESRD on dialysis  M/W/F      Seizures  after CVA, last seizure was last week 4/07/22      History of insertion of stent into coronary artery bypass graft  2009 and 2019      S/P CABG x 3  off pump C3L on 8/13/20          Vital Signs Last 24 Hrs  T(C): 36.7 (06 Sep 2022 10:26), Max: 36.8 (05 Sep 2022 15:08)  T(F): 98.1 (06 Sep 2022 10:26), Max: 98.3 (06 Sep 2022 05:00)  HR: 65 (06 Sep 2022 10:26) (63 - 74)  BP: 156/67 (06 Sep 2022 10:26) (135/65 - 174/79)  BP(mean): 96 (05 Sep 2022 20:57) (96 - 96)  RR: 20 (06 Sep 2022 10:26) (18 - 20)  SpO2: 93% (06 Sep 2022 10:26) (93% - 100%)    Parameters below as of 06 Sep 2022 10:26  Patient On (Oxygen Delivery Method): room air    I&O's Summary    05 Sep 2022 07:01  -  06 Sep 2022 07:00  --------------------------------------------------------  IN: 1520 mL / OUT: 2820 mL / NET: -1300 mL                       8.3    6.45  )-----------( 465      ( 06 Sep 2022 06:23 )             26.7     09-06    132<L>  |  91<L>  |  65<H>  ----------------------------<  146<H>  4.2   |  23  |  4.13<H>    Ca    9.0      06 Sep 2022 06:21  Phos  4.4     09-06  Mg     2.2     09-06    TPro  6.6  /  Alb  2.7<L>  /  TBili  0.3  /  DBili  0.2  /  AST  22  /  ALT  28  /  AlkPhos  524<H>  09-06      Review of systems  Gen: No weight changes, fatigue, fevers/chills, weakness  Skin: No rashes  Head/Eyes/Ears/Mouth: No headache; Normal hearing; Normal vision w/o blurriness; No sinus pain/discomfort, sore throat  Respiratory: No dyspnea, cough, wheezing, hemoptysis  CV: No chest pain, PND, orthopnea  GI: Mild abdominal pain at surgical incision site; denies diarrhea, constipation, nausea, vomiting, melena, hematochezia  : No increased frequency, dysuria, hematuria, nocturia  MSK: No joint pain/swelling; no back pain; no edema  Neuro: No dizziness/lightheadedness, weakness, seizures, numbness, tingling  Heme: No easy bruising or bleeding  Endo: No heat/cold intolerance  Psych: No significant nervousness, anxiety, stress, depression  All other systems were reviewed and are negative, except as noted.      PHYSICAL EXAM:  Constitutional: Well developed / well nourished  Neuro: A&O to person/place/time   Eyes: Anicteric, PERRLA  ENMT: nc/at  Neck: supple   Chest: LIJ permcath without exudate/erythema   Respiratory: CTA B/L  Cardiovascular: RRR  Gastrointestinal: Soft, non distended, mild tenderness at the incision site; incision c/d/i; erythema/rash improved   Genitourinary: Voiding spontaneously  Extremities: SCD's in place and working bilaterally   Vascular: Palpable dp pulses bilaterally  Neurological: A&O x3  Skin: no rashes, ulcerations or lesions;  Musculoskeletal: Moving all extremities  Psychiatric: Responsive     Transplant Surgery - Multidisciplinary Rounds  --------------------------------------------------------------  DDRT (DCD KDPI 82% 1a/1v/1u)   Date: 4/21/2022    Present:   Patient seen and examined with multidisciplinary team including Transplant Surgeon: Dr. Mann,  Transplant Nephrologist: Dr. JONO Lomeli, nephrology fellow, PGY 3 Dr. Chong, transplant NP Altagracia Montelongo and unit RN during am rounds.  Disciplines not in attendance will be notified of the plan.     HPI: 66M with PMH: DM type II, HTN, CAD s/p CABG in 2020, AFib on Eliquis, CVA in 2019 due to Afib, Seizure d/o following CVA, last episode was on 4/8/22, h/o GIB in 2/2022 EGD with duodenal ulcer.  ESRD due to DM was on HD since 2019.   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  Donor was 58 , KDPI 82%, DCD, single vessels and ureter, HLA mismatch 1, 2, 2. No DSA, cPRA 0%. 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 Re-admitted in May for anemia in the setting of large perinephric hematoma s/p evacuation and repair of arterial anastomosis on 5/2/22. Intra operative biopsy showed no rejection, Creatinine ranging ~2mg/dL.    In Rehab:  He was recently dx with klebsiella UTI with Ertapenem - then switched to Levaquin    He also had parainfluenza pneumonia. Was at rehab progressing well.      Hospital course:  - 6/10: transferred from rehab facility for seizure status epilepticus. Intubated for respiratory protection and transferred to MICU.  EEG showed no seizure activity. Neuro consulted.   - 6/11: Extubated. Found to have R pleural effusion. s/p Thoracentesis 1.3L. Eliquis changed to heparin drip.  Post procedure drop in H&H. 5u PRBC, 2 u FFP.   - 6/11 evening: Transferred to SICU from MICU for further care in setting of hypoxia, significant R pleural effusion, anemia and hemodynamic instability  - 6/11 Intubated at 9pm and sedated on Precedex.  CT placed, 600ml blood drained.  1L total overnight, started pressors  - 6/11 Received Protamine for PTT >100 (was on Heparin drip started for AF), 2 u FFP and 5u PRBC total  - 6/13 s/p VATS 2.2L old blood removed; second chest tube placed  - 6/18-19: chest tubes removed  - 6/21: ?PRES vs. chronic ischemic changes on MRI, off Envarsus, switched to Belatacept 6/23 with good graft function  - 6/25: Tx to SICU for refractory seizures, improved with IV antiepileptic regimen  - 7/10: Downgraded from SICU to floor.   - 7/11: OR-->URETERAL STENT REMOVED  - 7/15: ESBL Kleb UTI (50-90K), completed Ertapenem x 3 Days  - 7/25: Tx to SICU for Sepsis/ elevated LFTS  - 7/27: Shiley placed for HD  - 7/28: ongoing fevers -> started Ertapenem/Fluc  - 7/29: HD restarted + 1U PRBC.  IR bx with 2A rejection, started pulse steroids. LFTs have improved  - 8/4: refractory seizures  - 8/8: s/p derm bx with no acute finding  - 8/8: s/p permacath exchange  - 8/16: One seizure on EEG medications changed added brivaracetam  - 8/22: HD, followed by U/S of Transplant kidney  - 8/31 Eliquis changed to coumadin (due to 40% less efficacy while on phenytoin)      Interval Events:  - Afebrile, VSS,   - remains seizure free  - INR 2.46 from 1.74    - Last HD 9/5.   - Alk phos stable 527 <-- 524 <--434  - Abd US done no stones/ patent vessels    Immunosuppression   Induction:  Simulect induction                                         Maintenance immunosuppression: Sirolimus 7mg daily, MMF 250mg BID, Pred 10   Ongoing monitoring for signs of rejection.    Potential Discharge date: TBD  Education:  Medications  Plan of care:  See Below        MEDICATIONS  (STANDING):  amLODIPine   Tablet 10 milliGRAM(s) Oral daily  artificial  tears Solution 2 Drop(s) Left EYE every 6 hours  brivaracetam 50 milliGRAM(s) Oral two times a day  carvedilol 12.5 milliGRAM(s) Oral every 12 hours  chlorhexidine 2% Cloths 1 Application(s) Topical <User Schedule>  chlorhexidine 4% Liquid 1 Application(s) Topical <User Schedule>  doxazosin 4 milliGRAM(s) Oral <User Schedule>  epoetin cortney-epbx (RETACRIT) Injectable 39559 Unit(s) SubCutaneous <User Schedule>  finasteride 5 milliGRAM(s) Oral daily  furosemide    Tablet 80 milliGRAM(s) Oral <User Schedule>  heparin   Injectable 5000 Unit(s) SubCutaneous every 12 hours  insulin glargine Injectable (LANTUS) 3 Unit(s) SubCutaneous at bedtime  insulin lispro (ADMELOG) corrective regimen sliding scale   SubCutaneous three times a day before meals  insulin lispro (ADMELOG) corrective regimen sliding scale   SubCutaneous at bedtime  insulin lispro Injectable (ADMELOG) 10 Unit(s) SubCutaneous before breakfast  insulin lispro Injectable (ADMELOG) 8 Unit(s) SubCutaneous before lunch  insulin lispro Injectable (ADMELOG) 8 Unit(s) SubCutaneous before dinner  levothyroxine 50 MICROGram(s) Oral daily  melatonin 6 milliGRAM(s) Oral at bedtime  multivitamin 1 Tablet(s) Oral daily  mycophenolate mofetil 500 milliGRAM(s) Oral <User Schedule>  nystatin    Suspension 919945 Unit(s) Oral four times a day  pantoprazole    Tablet 40 milliGRAM(s) Oral <User Schedule>  phenytoin   Capsule 200 milliGRAM(s) Oral two times a day  polyethylene glycol 3350 17 Gram(s) Oral daily  predniSONE   Tablet 10 milliGRAM(s) Oral daily  senna 2 Tablet(s) Oral at bedtime  sirolimus 7 milliGRAM(s) Oral <User Schedule>  trimethoprim   80 mG/sulfamethoxazole 400 mG 1 Tablet(s) Oral daily  valGANciclovir 450 milliGRAM(s) Oral <User Schedule>    MEDICATIONS  (PRN):  acetaminophen     Tablet .. 650 milliGRAM(s) Oral every 6 hours PRN Mild Pain (1 - 3)  diphenhydrAMINE 25 milliGRAM(s) Oral at bedtime PRN Insomnia  ondansetron Injectable 4 milliGRAM(s) IV Push once PRN Nausea and/or Vomiting  sodium chloride 0.9% lock flush 10 milliLiter(s) IV Push every 1 hour PRN Pre/post blood products, medications, blood draw, and to maintain line patency      PAST MEDICAL & SURGICAL HISTORY:  Hypertension      Diabetes      Dyslipidemia      CAD (Coronary Artery Disease)  with Stents in 06/2009 and 6/2019, s/p off-pump C3L on 8/13/20      Hypothyroidism      CVA (cerebral vascular accident)  12/13/19 with residual bilateral weakness      Anemia      PEG (percutaneous endoscopic gastrostomy) status  removed July 2020      Intubation of airway performed without difficulty  Dec 2019  CVA c/b status epilepticus requiring intubation and PEG in 12/2019-      ESRD on dialysis  M/W/F      Seizures  after CVA, last seizure was last week 4/07/22      History of insertion of stent into coronary artery bypass graft  2009 and 2019      S/P CABG x 3  off pump C3L on 8/13/20          Vital Signs Last 24 Hrs  T(C): 36.4 (07 Sep 2022 14:30), Max: 37.6 (06 Sep 2022 21:29)  T(F): 97.5 (07 Sep 2022 14:30), Max: 99.7 (06 Sep 2022 21:29)  HR: 59 (07 Sep 2022 14:30) (59 - 74)  BP: 161/75 (07 Sep 2022 14:30) (114/59 - 175/80)  BP(mean): 115 (06 Sep 2022 17:05) (115 - 115)  RR: 20 (07 Sep 2022 14:30) (18 - 20)  SpO2: 97% (07 Sep 2022 14:30) (95% - 100%)    Parameters below as of 07 Sep 2022 14:30  Patient On (Oxygen Delivery Method): room air        I&O's Summary    06 Sep 2022 07:01  -  07 Sep 2022 07:00  --------------------------------------------------------  IN: 240 mL / OUT: 50 mL / NET: 190 mL    07 Sep 2022 07:01  -  07 Sep 2022 16:41  --------------------------------------------------------  IN: 340 mL / OUT: 50 mL / NET: 290 mL          LABS:                        8.0    6.30  )-----------( 442      ( 07 Sep 2022 06:30 )             26.1     09-07    132<L>  |  90<L>  |  85<H>  ----------------------------<  52<LL>  4.6   |  22  |  5.03<H>    Ca    8.6      07 Sep 2022 06:32  Phos  5.7     09-07  Mg     2.1     09-07    TPro  6.6  /  Alb  2.9<L>  /  TBili  0.3  /  DBili  0.2  /  AST  22  /  ALT  27  /  AlkPhos  527<H>  09-07    PT/INR - ( 07 Sep 2022 06:28 )   PT: 28.6 sec;   INR: 2.46 ratio         PTT - ( 07 Sep 2022 06:28 )  PTT:36.7 sec    CAPILLARY BLOOD GLUCOSE      POCT Blood Glucose.: 235 mg/dL (07 Sep 2022 12:34)  POCT Blood Glucose.: 107 mg/dL (07 Sep 2022 08:22)  POCT Blood Glucose.: 82 mg/dL (07 Sep 2022 07:36)  POCT Blood Glucose.: 237 mg/dL (06 Sep 2022 21:29)  POCT Blood Glucose.: 365 mg/dL (06 Sep 2022 17:04)    LIVER FUNCTIONS - ( 07 Sep 2022 06:32 )  Alb: 2.9 g/dL / Pro: 6.6 g/dL / ALK PHOS: 527 U/L / ALT: 27 U/L / AST: 22 U/L / GGT: x                 Cultures:    Review of systems  Gen: No weight changes, fatigue, fevers/chills, weakness  Skin: No rashes  Head/Eyes/Ears/Mouth: No headache; Normal hearing; Normal vision w/o blurriness; No sinus pain/discomfort, sore throat  Respiratory: No dyspnea, cough, wheezing, hemoptysis  CV: No chest pain, PND, orthopnea  GI: Mild abdominal pain at surgical incision site; denies diarrhea, constipation, nausea, vomiting, melena, hematochezia  : No increased frequency, dysuria, hematuria, nocturia  MSK: No joint pain/swelling; no back pain; no edema  Neuro: No dizziness/lightheadedness, weakness, seizures, numbness, tingling  Heme: No easy bruising or bleeding  Endo: No heat/cold intolerance  Psych: No significant nervousness, anxiety, stress, depression  All other systems were reviewed and are negative, except as noted.      PHYSICAL EXAM:  Constitutional: Well developed / well nourished  Neuro: A&O to person/place/time   Eyes: Anicteric, PERRLA  ENMT: nc/at  Neck: supple   Chest: LIJ permcath without exudate/erythema   Respiratory: CTA B/L  Cardiovascular: RRR  Gastrointestinal: Soft, non distended, mild tenderness at the incision site; incision c/d/i; erythema/rash improved   Genitourinary: oliguic  Extremities: SCD's in place and working bilaterally   Vascular: Palpable dp pulses bilaterally  Neurological: A&O x3  Skin: no rashes, ulcerations or lesions;  Musculoskeletal: Moving all extremities  Psychiatric: Responsive

## 2022-09-07 NOTE — PROGRESS NOTE ADULT - NSPROGADDITIONALINFOA_GEN_ALL_CORE
-Plan discussed with pt/team.  Contact info: [phone] (24/7). pager [phone]  [email or web address] password [email or web address]  Spent  over 25 minutes assessing pt/labs/meds and discussing plan of care with primary team  Adjusting insulin  Discharge plan  Follow up care

## 2022-09-07 NOTE — PROVIDER CONTACT NOTE (CRITICAL VALUE NOTIFICATION) - ACTION/TREATMENT ORDERED:
check FS, FS is 38, hypoglycemia protocol followed and D50 pushed transplant team aware
NP notified and made aware, will discuss with transplant team
give amp, recheck FS, will continue to monitor
Glucose tested via glucometer. Patient safety maintained.
will follow up with proper interventions

## 2022-09-07 NOTE — PROGRESS NOTE ADULT - ATTENDING SUPERVISION STATEMENT
Fellow
Resident
Resident
Fellow
Resident
Fellow
Resident
Resident/Fellow/Student
Fellow
Fellow
Resident
Resident/Fellow
Resident
Resident/Fellow/Student
Fellow
Resident
Fellow
Resident/Fellow/Student
Fellow

## 2022-09-07 NOTE — PROGRESS NOTE ADULT - ATTENDING COMMENTS
66 yr old man with h/o DM, HTN, CAD s/p CABG, Afib on Eliquis, CVA in 2019, Seizure d/o s/p DDRT from DCD donor on 4/21/22.   Complicated post transplant course - initial DGF eventually recovered. Klebsiella UTI/Sepsis rx with antibiotics, status epilepticus, Envarsus discontinued for neurotoxicity/seizures and he was started on belatacept -> complicated by ANA due to rejection Grade 2a treated with pulse steroids, Currently HD dependent.     1.S/p DDRT from DCD donor on 4/21/22 - Poor allograft function due to Grade 2a rejection (biopsy 7/29) , c4d negative, No DSA. S/p pulse solumedrol completed 8/3. Thymo not given due to frailty .Remains dialysis dependent. Oliguric. Transplant US on 8/25 homogenous flow, no hydro, 1.9 x 1.5 cm collection, no significant TRAS. Last HD was on 9/5. on Lasix 80mg po on non dialysis days   2.Immunosuppression  - MMF on hold due to recurrent infections, Envarsus d/darren for seizures and poor mental status and changed to Belatacept. Currently sirolimus 7mg daily and prednisone 10mg po daily and Cellcept  500mg po bid .Infection prophylaxis - Nystatin, Bactrim, Valcyte   3.Recurrent ESBL Klebsiella UTI - 7/15 and 7/25. Completed ertapenem on 8/3   4.Anemia s/p PRBC 1 unit 8/26. Continue epoetin 10,000 units sq TIW with HD   5.Hypertension - on Coreg 12.5mg po bid , Amlodipine 10 mg po daily and Doxazosin 4mg bid.   6. Diabetes - Controlled, Continue Lantus 5 and Admelog pre meal and sliding scale   7. Seizure d/o - on Briviact and phenytoin. Neurology following.   8. A.fib - Eliquis d/darren due to drug interactions. Coumadin 7 mg po tonight. f/u INR    D/c planning to rehab in progress.

## 2022-09-07 NOTE — PROGRESS NOTE ADULT - SUBJECTIVE AND OBJECTIVE BOX
[facility] [place] [facility]   MD NINA MASON MD, PA KRISTINE SOLTANPOUR, DO INJUNG KO, NP    TEL:  OFFICE: [phone]    From 5pm-7am Answering Service [phone]    -- RENAL FOLLOW UP NOTE ---Date of Service 09-07-22 @ 15:29    Patient is a 66y old  Male who presents with a chief complaint of Status Epilepticus (07 Sep 2022 11:16)      Patient seen and examined at bedside. No chest pain/sob    VITALS:  T(F): 97.5 (09-07-22 @ 14:30), Max: 99.7 (09-06-22 @ 21:29)  HR: 59 (09-07-22 @ 14:30)  BP: 161/75 (09-07-22 @ 14:30)  RR: 20 (09-07-22 @ 14:30)  SpO2: 97% (09-07-22 @ 14:30)  Wt(kg): --    09-06 @ 07:01  -  09-07 @ 07:00  --------------------------------------------------------  IN: 240 mL / OUT: 50 mL / NET: 190 mL          PHYSICAL EXAM:  Constitutional: NAD  Neck: No JVD  Respiratory: CTAB, no wheezes, rales or rhonchi  Cardiovascular: S1, S2, RRR  Gastrointestinal: BS+, soft, NT/ND  Extremities: No peripheral edema    Hospital Medications:   MEDICATIONS  (STANDING):  amLODIPine   Tablet 10 milliGRAM(s) Oral daily  artificial  tears Solution 2 Drop(s) Left EYE every 6 hours  brivaracetam 50 milliGRAM(s) Oral two times a day  carvedilol 12.5 milliGRAM(s) Oral every 12 hours  chlorhexidine 2% Cloths 1 Application(s) Topical <User Schedule>  chlorhexidine 4% Liquid 1 Application(s) Topical <User Schedule>  doxazosin 4 milliGRAM(s) Oral <User Schedule>  epoetin cortney-epbx (RETACRIT) Injectable 38042 Unit(s) SubCutaneous <User Schedule>  finasteride 5 milliGRAM(s) Oral daily  furosemide    Tablet 80 milliGRAM(s) Oral <User Schedule>  heparin   Injectable 5000 Unit(s) SubCutaneous every 12 hours  insulin glargine Injectable (LANTUS) 3 Unit(s) SubCutaneous at bedtime  insulin lispro (ADMELOG) corrective regimen sliding scale   SubCutaneous three times a day before meals  insulin lispro (ADMELOG) corrective regimen sliding scale   SubCutaneous at bedtime  insulin lispro Injectable (ADMELOG) 10 Unit(s) SubCutaneous before breakfast  insulin lispro Injectable (ADMELOG) 8 Unit(s) SubCutaneous before lunch  insulin lispro Injectable (ADMELOG) 8 Unit(s) SubCutaneous before dinner  levothyroxine 50 MICROGram(s) Oral daily  melatonin 6 milliGRAM(s) Oral at bedtime  multivitamin 1 Tablet(s) Oral daily  mycophenolate mofetil 500 milliGRAM(s) Oral <User Schedule>  nystatin    Suspension 312162 Unit(s) Oral four times a day  pantoprazole    Tablet 40 milliGRAM(s) Oral <User Schedule>  phenytoin   Capsule 200 milliGRAM(s) Oral two times a day  polyethylene glycol 3350 17 Gram(s) Oral daily  predniSONE   Tablet 10 milliGRAM(s) Oral daily  senna 2 Tablet(s) Oral at bedtime  sirolimus 7 milliGRAM(s) Oral <User Schedule>  trimethoprim   80 mG/sulfamethoxazole 400 mG 1 Tablet(s) Oral daily  valGANciclovir 450 milliGRAM(s) Oral <User Schedule>      LABS:  09-07    132<L>  |  90<L>  |  85<H>  ----------------------------<  52<LL>  4.6   |  22  |  5.03<H>    Ca    8.6      07 Sep 2022 06:32  Phos  5.7     09-07  Mg     2.1     09-07    TPro  6.6  /  Alb  2.9<L>  /  TBili  0.3  /  DBili  0.2  /  AST  22  /  ALT  27  /  AlkPhos  527<H>  09-07    Creatinine Trend: 5.03 <--, 4.13 <--, 5.62 <--, 4.70 <--, 3.51 <--, 4.78 <--, 3.84 <--    Phosphorus Level, Serum: 5.7 mg/dL (09-07 @ 06:32)  Albumin, Serum: 2.9 g/dL (09-07 @ 06:32)                              8.0    6.30  )-----------( 442      ( 07 Sep 2022 06:30 )             26.1     Urine Studies:  Urinalysis - [08-16-22 @ 18:49]      Color Yellow / Appearance Slightly Turbid / SG 1.018 / pH 8.5      Gluc 200 mg/dL / Ketone Negative  / Bili Negative / Urobili Negative       Blood Large / Protein >600 / Leuk Est Moderate / Nitrite Positive      RBC 13 / WBC 40 / Hyaline 7 / Gran  / Sq Epi  / Non Sq Epi 4 / Bacteria Moderate      Iron 17, TIBC 171, %sat 10      [05-02-22 @ 13:03]  Ferritin 6336      [07-27-22 @ 13:00]  PTH -- (Ca 9.2)      [02-05-22 @ 10:13]   294  HbA1c 6.0      [02-21-20 @ 08:53]  TSH 4.69      [07-06-22 @ 18:36]  Lipid: chol --, , HDL --, LDL --      [07-27-22 @ 13:00]    HBsAg Nonreact      [09-07-22 @ 06:32]  HCV 0.19, Nonreact      [09-07-22 @ 06:32]      RADIOLOGY & ADDITIONAL STUDIES:

## 2022-09-07 NOTE — PROGRESS NOTE ADULT - NUTRITIONAL ASSESSMENT
This patient has been assessed with a concern for Malnutrition and has been determined to have a diagnosis/diagnoses of Severe protein-calorie malnutrition.    This patient is being managed with:   Diet Consistent Carbohydrate w/Evening Snack-  1000mL Fluid Restriction ([id number])  No Concentrated Phosphorus  Supplement Feeding Modality:  Oral  Nepro Cans or Servings Per Day:  3       Frequency:  Daily  Entered: Aug 29 2022  4:09PM

## 2022-09-07 NOTE — PROGRESS NOTE ADULT - PROBLEM SELECTOR PLAN 1
Pt. 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 No DSA.     s/p pulse dose steroids. Thymo was not given to treat the rejection as he is too frail and at increased risk for infections. Now with failed allograft function, Remains anuric and is dialysis dependent. Pt underwent IR guided HD catheter exchange on 8/8/22. Last HD done on 9/5/22. Tolerated well. Pt. underwent L IJ tunneled HD catheter placement on 8/30/22.      Labs reviewed. Pt. is still anuric. Plan for HD today. Monitor for labs. Dose meds as per HD.

## 2022-09-07 NOTE — PROGRESS NOTE ADULT - ASSESSMENT
66M with h/o DM type II, HTN, CAD s/p CABG in 2020, Afib on Eliquis, CVA in 2019 due to Afib, Seizure d/o (last episode was on 4/8/22), h/o GI bleed in 2/2022 EGD with duodenal ulcer and ESRD on HD s/p R DDRT from DCD donor on 4/21/22 complicated by DGF requiring HD until 4/29/22 who presented with status epilepticus in setting of UTI/antibiotics and sub-therapeutic valproic acid level. Transferred to SICU on 7/25 with signs of sepsis, now with refractory seizures. Last reported seizure on 8/16. Mental status continuing to improve.     Seizure Disorder:  - Neuro/Epilepsy Teams following. Avoid Fycompa 2/2 agitation in past  - 1 min seizure with clonic movements noted on EEG 8/16. No further seizures noted.  EEG 8/16-8/18  - Continue additional anti-seizure medication Briviact 50mg BID with additional 50mg post each HD session  - On Phenytoin and Briviact  - Appreciate neuro recommendations to wean and discontinue Phenytoin and to continue Briviact.  Given prolonged hospital course and current stable regimen, would prefer to make medication adjustments as outpatient.  Discussed with Neuro  - prior MRI head 6/27 with less concerns for PRES, likely ischemic changes  - o/n CTH 8/3 with no acute findings  - Follow up with neuro regarding seizure medications recs for discharge      R DCD DDRT 4/21/22 c/b 2A ACR  - 2A ACR: s/p pulse steroids. Continue Prednisone 10mg QD  - HD/UF as per nephrology for fluid overload/anuria   - Lasix 80mg on off HD days  - 8/14 surveillance BCx negative  - Repeated US from 8/25 did not show any renal artery stenosis, but increased resistive index, consistent with parenchymal disease was noted  - S/P removal of ureteral stent on 7/12  - 8/8 Punch biopsy wound Culture: ngtd. Negative for PTLD  - fungitel negative  - Liver US for uptrending alk phos 9/6: Borderline hepatomegaly. No masses seen. Patent portal and hepatic veins.   - LE studies WNL  - Change Midline     RLQ Cellulitis:  - Deferring right psoas muscle/fascia biopsy as wound is not worsening  - Complete course of Keflex (8/20 - 9/4)     Immunosuppression:   - Belatacept: 6/23-8/1 discontinued  - Sirolimus 7 mg QD- Level reviewed 4.1 today (no changes made)   - Cellcept 500 BID.   - Pred 10mg QD  - PPX: Nystatin, bactrim, valcyte (MWF)    DM  - Labile blood glucose  - On Lantus 5U and pre-meal 10/8/8U with ISS coverage  - Fingerstick ac and qhs  - Endo recs appreciated    AFib/R IJ DVT   - Full ac transitioned to coumadin on 8/31 dose daily per INR goal >2  - rate controlled  - SQH 5000u BID for DVT ppx while INR subtherapeutic   - Eliquis with ~40% less efficacy while on fosphenytoin      HTN:  - Amlodipine/Doxazosin. Coreg 12.5 BID    BPH:  - Continue Doxazosin/Proscar    Dispo:  - Plan for acute rehab when medically stable 66M with h/o DM type II, HTN, CAD s/p CABG in 2020, Afib on Eliquis, CVA in 2019 due to Afib, Seizure d/o (last episode was on 4/8/22), h/o GI bleed in 2/2022 EGD with duodenal ulcer and ESRD on HD s/p R DDRT from DCD donor on 4/21/22 complicated by DGF requiring HD until 4/29/22 who presented with status epilepticus in setting of UTI/antibiotics and sub-therapeutic valproic acid level. Transferred to SICU on 7/25 with signs of sepsis, now with refractory seizures. Last reported seizure on 8/16. Mental status continuing to improve.     Seizure Disorder:  - Neuro/Epilepsy Teams following. Avoid Fycompa 2/2 agitation in past  - 1 min seizure with clonic movements noted on EEG 8/16. No further seizures noted.  EEG 8/16-8/18  - Continue additional anti-seizure medication Briviact 50mg BID with additional 50mg post each HD session  - On Phenytoin and Briviact  - Appreciate neuro recommendations to wean and discontinue Phenytoin and to continue Briviact.  Given prolonged hospital course and current stable regimen, would prefer to make medication adjustments as outpatient.  Discussed with Neuro  - prior MRI head 6/27 with less concerns for PRES, likely ischemic changes  - o/n CTH 8/3 with no acute findings  - Follow up with neuro regarding seizure medications recs for discharge      R DCD DDRT 4/21/22 c/b 2A ACR  - 2A ACR: s/p pulse steroids. Continue Prednisone 10mg QD  - HD/UF as per nephrology for fluid overload/anuria   - Lasix 80mg on off HD days  - 8/14 surveillance BCx negative  - Repeated US from 8/25 did not show any renal artery stenosis, but increased resistive index, consistent with parenchymal disease was noted  - S/P removal of ureteral stent on 7/12  - 8/8 Punch biopsy wound Culture: ngtd. Negative for PTLD  - fungitel negative  - Liver US for uptrending alk phos 9/6: Borderline hepatomegaly. No masses seen. Patent portal and hepatic veins.   - LE studies WNL  - Change Midline     RLQ Cellulitis:  - Deferring right psoas muscle/fascia biopsy as wound is not worsening  - Complete course of Keflex (8/20 - 9/4)     Immunosuppression:   - Belatacept: 6/23-8/1 discontinued  - Sirolimus 7 mg QD- Level reviewed 4.1 today (no changes made)   - Cellcept 500 BID.   - Pred 10mg QD  - PPX: Nystatin, bactrim, valcyte (MWF)    DM  - Labile blood glucose  - On Lantus 5U and pre-meal 10/8/8U with ISS coverage  - Fingerstick ac and qhs  - Endo recs appreciated    AFib/R IJ DVT   - Full ac transitioned to coumadin on 8/31 dose daily per INR goal >2 -2.5  - coumadin 8 mg tonight  - rate controlled  - SQH 5000u BID for DVT ppx while INR subtherapeutic   - Eliquis with ~40% less efficacy while on fosphenytoin      HTN:  - Amlodipine/Doxazosin. Coreg 12.5 BID    BPH:  - Continue Doxazosin/Proscar    Dispo:  - Plan for acute rehab when medically stable

## 2022-09-07 NOTE — PROGRESS NOTE ADULT - NS ATTEND AMEND GEN_ALL_CORE FT
making slightly more urine. cont lasix on non-HD days  cont coumadin for IJ thrombus. decrease to 7.5mg qhs  Immunosuppression management - Cont sirolimus 7mg daily, prednisone 10 mg daily; cellcept 500mg bid  f/u  for placement in rehab facility

## 2022-09-07 NOTE — PROGRESS NOTE ADULT - PROBLEM SELECTOR PLAN 1
-Test BG AC/HS  -Decrease Lantusdose to 3 units QHS  -C/w Admelog 10-8-8 ac meals and administer 5 units for BG <120 if pt eats. HOLD IF PT NOT EATING.  -c/w Admelog low correction scale AC and low HS scale  -notify endocrine team if steroids changed as insulin regimen will need adjustment  Discharge planning:   - plan to rehab, likely will need basal bolus insulin final doses tbd  Outpatient f/u with Endocrinologist Dr. Lincoln. [address]. Phone  Needs apt at time of discharge  -Needs optho/renal/cardiac f/u as out pt  -Make sure pt has insulin and DM supplies at time of discharge

## 2022-09-07 NOTE — PROVIDER CONTACT NOTE (CRITICAL VALUE NOTIFICATION) - BACKGROUND
patient admitted for DDRT s/p multiple acute complications
pt NPO for NGT to suction
65 y/o male PMH ESRD, S/P DDRT 4/22, CVA 2019, hx of seizure disorder. Writer notified via lab serum glucose of 52.
admitted with status epilepticus
pt with history of DDRT and DM

## 2022-09-07 NOTE — PROGRESS NOTE ADULT - SUBJECTIVE AND OBJECTIVE BOX
[facility] DIVISION OF KIDNEY DISEASES AND HYPERTENSION -- FOLLOW UP NOTE  --------------------------------------------------------------------------------    HPI: 66y old  Male h/o DM type II, HTN, CAD s/p CABG in 2020, Afib on Eliquis, CVA in 2019 due to Afib, Hypothyroidism, Seizure d/o, h/o GI bleed in 2/2022 EGD with duodenal ulcer and ESRD on HD s/p R DDRT from DCD donor on 4/21/22 complicated by DGF requiring HD until 4/29/22, later had good graft function who was readmitted with status epilepticus in setting of UTI/antibiotics and sub-therapeutic valproic acid level.  Envarsus was discontinued for seizures and poor mental status and he was started on belatacept. Subsequent ANA due to rejection. 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 Received pulse steroids (Solumedrol 500 on 7/30 -> 250 on 7/31). Did not receive thymo due to frailty. Currently HD dependent.     Pt. seen and examined at bedside. Pt. denies any chest pain. shortness of breath, nausea, vomiting, or abdominal pain. Last HD on 9/5/22. Tolerated well. Underwent L IJ Tunneled HD catheter placement on 8/30/22.       Standing Inpatient Medications  amLODIPine   Tablet 10 milliGRAM(s) Oral daily  artificial  tears Solution 2 Drop(s) Left EYE every 6 hours  brivaracetam 50 milliGRAM(s) Oral two times a day  carvedilol 6.25 milliGRAM(s) Oral every 12 hours  carvedilol 12.5 milliGRAM(s) Oral every 12 hours  chlorhexidine 2% Cloths 1 Application(s) Topical <User Schedule>  chlorhexidine 4% Liquid 1 Application(s) Topical <User Schedule>  doxazosin 4 milliGRAM(s) Oral <User Schedule>  epoetin cortney-epbx (RETACRIT) Injectable 46284 Unit(s) SubCutaneous <User Schedule>  finasteride 5 milliGRAM(s) Oral daily  furosemide    Tablet 80 milliGRAM(s) Oral <User Schedule>  heparin   Injectable 5000 Unit(s) SubCutaneous every 12 hours  insulin glargine Injectable (LANTUS) 3 Unit(s) SubCutaneous at bedtime  insulin lispro (ADMELOG) corrective regimen sliding scale   SubCutaneous three times a day before meals  insulin lispro (ADMELOG) corrective regimen sliding scale   SubCutaneous at bedtime  insulin lispro Injectable (ADMELOG) 10 Unit(s) SubCutaneous before breakfast  insulin lispro Injectable (ADMELOG) 8 Unit(s) SubCutaneous before lunch  insulin lispro Injectable (ADMELOG) 8 Unit(s) SubCutaneous before dinner  levothyroxine 50 MICROGram(s) Oral daily  melatonin 6 milliGRAM(s) Oral at bedtime  multivitamin 1 Tablet(s) Oral daily  mycophenolate mofetil 500 milliGRAM(s) Oral <User Schedule>  nystatin    Suspension 285053 Unit(s) Oral four times a day  pantoprazole    Tablet 40 milliGRAM(s) Oral <User Schedule>  phenytoin   Capsule 200 milliGRAM(s) Oral two times a day  polyethylene glycol 3350 17 Gram(s) Oral daily  predniSONE   Tablet 10 milliGRAM(s) Oral daily  senna 2 Tablet(s) Oral at bedtime  sirolimus 7 milliGRAM(s) Oral <User Schedule>  trimethoprim   80 mG/sulfamethoxazole 400 mG 1 Tablet(s) Oral daily  valGANciclovir 450 milliGRAM(s) Oral <User Schedule>    PRN Inpatient Medications  acetaminophen     Tablet .. 650 milliGRAM(s) Oral every 6 hours PRN  diphenhydrAMINE 25 milliGRAM(s) Oral at bedtime PRN  ondansetron Injectable 4 milliGRAM(s) IV Push once PRN  sodium chloride 0.9% lock flush 10 milliLiter(s) IV Push every 1 hour PRN    ROS:  All negative except as mentioned above.     VITALS/PHYSICAL EXAM  --------------------------------------------------------------------------------  T(C): 36.4 (09-07-22 @ 09:06), Max: 37.6 (09-06-22 @ 21:29)  HR: 62 (09-07-22 @ 09:06) (62 - 74)  BP: 114/59 (09-07-22 @ 09:06) (114/59 - 175/80)  RR: 20 (09-07-22 @ 09:06) (18 - 20)  SpO2: 95% (09-07-22 @ 09:06) (95% - 100%)  Wt(kg): --        09-06-22 @ 07:01  -  09-07-22 @ 07:00  --------------------------------------------------------  IN: 240 mL / OUT: 50 mL / NET: 190 mL      Physical Exam:  	Gen: NAD   	HEENT: PERRL, MMM   	Pulm: CTA B/L, no crackles   	CV: RRR, S1S2+  	Abd: +BS, soft, RLQ with erythema, no tenderness               Transplant: No tenderness, swelling  	: No suprapubic tenderness  	MSK: trace edema   	Psych: Normal affect and mood, very lethargic  	Skin: Warm              Access: Left IJ tunneled HD catheter +    LABS/STUDIES  --------------------------------------------------------------------------------              8.0    6.30  >-----------<  442      [09-07-22 @ 06:30]              26.1     132  |  90  |  85  ----------------------------<  52      [09-07-22 @ 06:32]  4.6   |  22  |  5.03        Ca     8.6     [09-07-22 @ 06:32]      Mg     2.1     [09-07-22 @ 06:32]      Phos  5.7     [09-07-22 @ 06:32]    TPro  6.6  /  Alb  2.9  /  TBili  0.3  /  DBili  0.2  /  AST  22  /  ALT  27  /  AlkPhos  527  [09-07-22 @ 06:32]    PT/INR: PT 28.6 , INR 2.46       [09-07-22 @ 06:28]  PTT: 36.7       [09-07-22 @ 06:28]      Creatinine Trend:  SCr 5.03 [09-07 @ 06:32]  SCr 4.13 [09-06 @ 06:21]  SCr 5.62 [09-05 @ 06:20]  SCr 4.70 [09-04 @ 06:07]  SCr 3.51 [09-03 @ 06:51]        Sirolimus (Rapamycin) Level, Serum: 5.2 ng/mL (09-07 @ 06:29)  Sirolimus (Rapamycin) Level, Serum: 5.4 ng/mL (09-06 @ 06:23)  Sirolimus (Rapamycin) Level, Serum: 6.6 ng/mL (09-05 @ 06:20)  Sirolimus (Rapamycin) Level, Serum: 4.1 ng/mL (09-04 @ 06:08)          CAPILLARY BLOOD GLUCOSE      POCT Blood Glucose.: 107 mg/dL (07 Sep 2022 08:22)  POCT Blood Glucose.: 82 mg/dL (07 Sep 2022 07:36)  POCT Blood Glucose.: 237 mg/dL (06 Sep 2022 21:29)  POCT Blood Glucose.: 365 mg/dL (06 Sep 2022 17:04)  POCT Blood Glucose.: 249 mg/dL (06 Sep 2022 12:21)      Urinalysis - [08-16-22 @ 18:49]      Color Yellow / Appearance Slightly Turbid / SG 1.018 / pH 8.5      Gluc 200 mg/dL / Ketone Negative  / Bili Negative / Urobili Negative       Blood Large / Protein >600 / Leuk Est Moderate / Nitrite Positive      RBC 13 / WBC 40 / Hyaline 7 / Gran  / Sq Epi  / Non Sq Epi 4 / Bacteria Moderate      Iron 17, TIBC 171, %sat 10      [05-02-22 @ 13:03]  Ferritin 6336      [07-27-22 @ 13:00]  PTH -- (Ca 9.2)      [02-05-22 @ 10:13]   294  HbA1c 6.0      [02-21-20 @ 08:53]  TSH 4.69      [07-06-22 @ 18:36]  Lipid: chol --, , HDL --, LDL --      [07-27-22 @ 13:00]

## 2022-09-07 NOTE — PROGRESS NOTE ADULT - PROBLEM SELECTOR PLAN 4
Anemia in the setting of renal disease. Continue with EPO 10K units three times per week with HD. Hg below goal at this time. Monitor. Hg.     If any questions, please feel free to contact me     Steven Hyatt  Nephrology Fellow  [facility] Pager: [phone].

## 2022-09-08 NOTE — DISCHARGE NOTE NURSING/CASE MANAGEMENT/SOCIAL WORK - PATIENT PORTAL LINK FT
You can access the FollowMyHealth Patient Portal offered by [facility] by registering at the following website: [email or web address]. By joining [facility]’s FollowMyHealth portal, you will also be able to view your health information using other applications (apps) compatible with our system.

## 2022-09-08 NOTE — H&P ADULT - HISTORY OF PRESENT ILLNESS
66 yr old Male with PMHx of DM type II, HTN, CAD s/p stents/ CABG (2020), AFib on Eliquis, CVA (2019) d/t Afib, Seizure d/o following CVA, last episode was on 4/8/22, h/o GIB (2/2022) EGD with duodenal ulcer.  ESRD due to DM was on HD since 2019, s/p premacath removal 5/10/22 s/p Right DDRT (4/21/22) and placed on belatacept.  Recent cent hospitalization 4/28/22 -5/10/22 with weakness/anemia found to have perinephric hematoma requiring evacuation and repair of bleeding arterial anastomosis. Wast discharged to [facility], currently being treated for UTI with Levaquin, developed multiple sz episodes refractory to 5 mg versed IM (EMS) and 2 mg ativan IV in E.D. concerning for status epilepticus requiring intubation for hypoxic respiratory  failure. MICU Consult was called for hypoxic respiratory failure secondary to status epilepticus. Was recently dx with klebsiella UTI - started with ertapenem which was then switched to Levaquin.  He also had Parainfluenza PNA.      Patient readmitted 6/10 ED VS temp 97.8 Axillary, HR 61 RR26 100% on NRB mask. Per ED noted the patient was noted to be gurgling and was unable to protect airway, and was intubated.  EEG without Sz activity. 6/11 patient was extubated and had thoracentesis (1.3L)  for right pleural effusion - post op he needed 5U PRBC and 2 U FFP.   Was reintubated 6/11 - CT found hemothorax ~1.6 L.  S/p VATS with thoracic 2.2 L old blood removed and 2nd chest tube placed (removed 6/19).  R IJ DVT (6/24) - heparin switched to lovenox.  Noted with refractory Sx and AMS debility and placed on tube feed (7/10).    Hospital course complicated by sepsis - UTI treated with Ertapenem.  Despite abx, he had intermittent fevers.  s/p Urethral stent (7/12). Restarted HD (7/29) + 1 U PRBC.  Immunosuppressive drug switched to Sirolimus.  Noted again with refractory seizures on 8/4 - EEG with 1 seizure, Brivaracetam started (8/16).  AC switched from eliquis to coumadin (d/t 40% less efficacy while on phenytoin). 66 yr old Male with PMHx of DM type II, HTN, CAD s/p stents/ CABG (2020), AFib on Eliquis, CVA (2019) d/t Afib, Seizure d/o following CVA, last episode was on 4/8/22, h/o GIB (2/2022) EGD with duodenal ulcer.  ESRD due to DM was on HD since 2019, s/p premacath removal 5/10/22 s/p Right DDRT (4/21/22) and placed on belatacept.  Recent hospitalization 4/28/22 -5/10/22 with weakness/anemia found to have perinephric hematoma requiring evacuation and repair of bleeding arterial anastomosis. Was discharged to [facility], currently being treated for UTI with Levaquin, developed multiple sz episodes refractory to 5 mg versed IM (EMS) and 2 mg ativan IV in E.D. concerning for status epilepticus requiring intubation for hypoxic respiratory failure. MICU Consult was called for hypoxic respiratory failure secondary to status epilepticus. Was recently dx with klebsiella UTI - started with ertapenem which was then switched to Levaquin. He also had Parainfluenza PNA.      Patient readmitted 6/10 ED VS temp 97.8 Axillary, HR 61 RR26 100% on NRB mask. Per ED noted the patient was noted to be gurgling and was unable to protect airway, and was intubated.  EEG without Sz activity. 6/11 patient was extubated and had thoracentesis (1.3L)  for right pleural effusion - post op he needed 5U PRBC and 2 U FFP.  Was reintubated 6/11 - CT found hemothorax ~1.6 L.  S/p VATS with thoracic 2.2 L old blood removed and 2nd chest tube placed (removed 6/19).  R IJ DVT (6/24) - heparin switched to lovenox.  Noted with refractory Sx and AMS debility and placed on tube feed (7/10).    Hospital course complicated by sepsis - UTI treated with Ertapenem.  Despite abx, he had intermittent fevers.  s/p Urethral stent (7/12). Restarted HD (7/29) + 1 U PRBC.  Immunosuppressive drug switched to Sirolimus.  Noted again with refractory seizures on 8/4 - EEG with 1 seizure, Brivaracetam started (8/16).  AC switched from eliquis to coumadin (d/t 40% less efficacy while on phenytoin).

## 2022-09-08 NOTE — H&P ADULT - NSHPLABSRESULTS_GEN_ALL_CORE
8.1    6.34  )-----------( 405      ( 08 Sep 2022 06:02 )             25.9     09-08    131<L>  |  91<L>  |  56<H>  ----------------------------<  260<H>  4.0   |  22  |  3.56<H>    Ca    8.5      08 Sep 2022 06:02  Phos  4.8     09-08  Mg     2.0     09-08    TPro  6.5  /  Alb  2.7<L>  /  TBili  0.3  /  DBili  0.2  /  AST  22  /  ALT  28  /  AlkPhos  577<H>  09-08    INR: 3.02 ratio         RADIOLOGY, EKG & ADDITIONAL TESTS:   US Abdomen Doppler (09.06.22 @ 09:57) >  Borderline hepatomegaly    IR (8/30) - Tunneled HD catheter (left IJ)< from: [facility] Duplex Upper Ext Vein Scan, Bilat (08.10.22 @ 13:03) >    Duplex (08.10/.2022) -   The right internal jugular vein remains thrombosed.  Superficial thrombophlebitis affects the left cephalic vein    Xray Chest 1 View-  (06.11.22 @ 23:21) >  Moderate to large right pleural effusion with associated atelectasis is decreased in size compared to the prior study.    CT Chest No Cont (06.12.22 @ 17:53) >  Right-sided chest tube with large hemothorax and atelectasis of the right lower lobe    [facility] Duplex Upper Ext Vein Scan, Right (06.24.22 @ 14:19) >  Acute DVT is identified in the right internal jugular vein.  Superficial vein thrombus is seen in the right cephalic and basilic veins.    MR Head w/wo IV Cont (06.27.22 @ 15:23) >  Extensive bilateral frontal parietal gliosis and   encephalomalacia with minimal enhancement which may be related to ischemic changes versus changes of hypertensive encephalopathy in a patient with renal transplant on immunosuppressants. No evidence of cerebritis or abscess. 8.1    6.34  )-----------( 405      ( 08 Sep 2022 06:02 )             25.9     09-08    131<L>  |  91<L>  |  56<H>  ----------------------------<  260<H>  4.0   |  22  |  3.56<H>    Ca    8.5      08 Sep 2022 06:02  Phos  4.8     09-08  Mg     2.0     09-08    TPro  6.5  /  Alb  2.7<L>  /  TBili  0.3  /  DBili  0.2  /  AST  22  /  ALT  28  /  AlkPhos  577<H>  09-08    INR: 3.02 ratio       RADIOLOGY, EKG & ADDITIONAL TESTS:   US Abdomen Doppler (09.06.22 @ 09:57)   Borderline hepatomegaly    IR (8/30) - Tunneled HD catheter (left IJ)< from: [facility] Duplex Upper Ext Vein Scan, Bilat (08.10.22 @ 13:03)     Duplex (08.10/.2022) -   The right internal jugular vein remains thrombosed.  Superficial thrombophlebitis affects the left cephalic vein    Xray Chest 1 View-  (06.11.22 @ 23:21)   Moderate to large right pleural effusion with associated atelectasis is decreased in size compared to the prior study.    CT Chest No Cont (06.12.22 @ 17:53)   Right-sided chest tube with large hemothorax and atelectasis of the right lower lobe    [facility] Duplex Upper Ext Vein Scan, Right (06.24.22 @ 14:19)   Acute DVT is identified in the right internal jugular vein.  Superficial vein thrombus is seen in the right cephalic and basilic veins.    MR Head w/wo IV Cont (06.27.22 @ 15:23)   Extensive bilateral frontal parietal gliosis and   encephalomalacia with minimal enhancement which may be related to ischemic changes versus changes of hypertensive encephalopathy in a patient with renal transplant on immunosuppressants. No evidence of cerebritis or abscess.

## 2022-09-08 NOTE — PROGRESS NOTE ADULT - PROBLEM SELECTOR PLAN 1
Test BG AC/HS  -Increase Lantus dose to 4 units QHS. Fasting glucose in mid 200s today  -Adjust Admelog 10-8-9 ac meals and administer 5 units for BG <120 if pt eats. HOLD IF PT NOT EATING.  -c/w Admelog low correction scale AC and low HS scale  -notify endocrine team if steroids changed as insulin regimen will need adjustment  Discharge planning:   - plan to rehab, likely will need basal bolus insulin final doses tbd  Outpatient f/u with Endocrinologist Dr. Lincoln. [address] [facility] [address]. Phone  Needs apt at time of discharge  -Needs optho/renal/cardiac f/u as out pt  -Make sure pt has insulin and DM supplies at time of discharge.

## 2022-09-08 NOTE — PROGRESS NOTE ADULT - PROBLEM SELECTOR PROBLEM 2
HTN (hypertension)
HTN (hypertension)
Immunosuppressive management encounter following kidney transplant
Immunosuppressive management encounter following kidney transplant
HTN (hypertension)
Immunosuppressive management encounter following kidney transplant
HTN (hypertension)
Immunosuppressive management encounter following kidney transplant
Current chronic use of systemic steroids
HTN (hypertension)
Hypothyroidism
Immunosuppressive management encounter following kidney transplant
Type 2 diabetes mellitus with hypoglycemia, with long-term current use of insulin
Type 2 diabetes mellitus with hypoglycemia, with long-term current use of insulin
HTN (hypertension)
HTN (hypertension)
Hypothyroidism
Immunosuppressive management encounter following kidney transplant
HTN (hypertension)
Immunosuppressive management encounter following kidney transplant
HTN (hypertension)
Immunosuppressive management encounter following kidney transplant
HLD (hyperlipidemia)
HTN (hypertension)
Immunosuppressive management encounter following kidney transplant
HTN (hypertension)
Immunosuppressive management encounter following kidney transplant
Immunosuppressive management encounter following kidney transplant
Type 2 diabetes mellitus with hypoglycemia, with long-term current use of insulin
HTN (hypertension)
Immunosuppressive management encounter following kidney transplant
HTN (hypertension)
HTN (hypertension)
Immunosuppressive management encounter following kidney transplant
HTN (hypertension)
Immunosuppressive management encounter following kidney transplant
Immunosuppressive management encounter following kidney transplant
HTN (hypertension)
HTN (hypertension)
Immunosuppressive management encounter following kidney transplant
Immunosuppressive management encounter following kidney transplant
HTN (hypertension)
HTN (hypertension)
Immunosuppressive management encounter following kidney transplant
HTN (hypertension)
Immunosuppressive management encounter following kidney transplant
HTN (hypertension)
Immunosuppressive management encounter following kidney transplant
Immunosuppressive management encounter following kidney transplant

## 2022-09-08 NOTE — DISCHARGE NOTE NURSING/CASE MANAGEMENT/SOCIAL WORK - NSDCFUADDAPPT_GEN_ALL_CORE_FT
Please contact the transplant clinic to schedule virtual f/u appointments.  Contact the transplant clinic for dosing of transplant immunosuppression medication     -f/u with Endocrinology Dr. Lincoln on d/c   -f/u with Epilepsy team Dr. Andre Patterson on dc  -f/u with Cardiology on dc

## 2022-09-08 NOTE — PATIENT PROFILE ADULT - LIVING ENVIRONMENT
Allyssa Wright  : 1979   MRN: [id number]  Date: 2019     Electroconvulsive Therapy  History & Physical    Chief complaint: Major Depressive Disorder, recurrent, partial remission  Procedure: ECT #76    SUBJECTIVE:     HPI:   Allyssa Wright is a 40 y.o. female with Major Depressive Disorder, recurrent, in partial remission who presents for ECT. The patient complains of depression, which has been improving gradually.  Still does not feel fully back to baseline, but is overall doing better.  No changes in appetite or sleep, mild increase from baseline memory problems.  Father at bedside agrees that she has been a little brighter and appears to be responding.  Pt & father inquire about two versus one treatment next week, unsure about spacing to once weekly.  Please see Dr. Boyce's full pre-ECT evaluation for further details. The patient does not need any prescriptions filled here and has not had any med changes since meeting with Dr. Boyce. The patient has not had anything by mouth since midnight and is ready for ECT.    Psychiatric Review of Systems:  Mood: improving, mildly depressed  Appetite: No problem  Psychomotor: No problem  Cognitive Impairment: Moderate  Insomnia: None  Psychosis: None  Diurnal Variation: None  Suicidal Ideation: Absent    Medical Review Of Systems:  Pertinent items are noted in HPI.    Current Medications:   Current Facility-Administered Medications on File Prior to Encounter   Medication Dose Route Frequency Provider Last Rate Last Dose    lidocaine (PF) 10 mg/ml (1%) injection 10 mg  1 mL Intradermal Once Homa Colbert MD        sodium chloride 0.9% flush 10 mL  10 mL Intra-Catheter PRN Homa Colbert MD         Current Outpatient Medications on File Prior to Encounter   Medication Sig Dispense Refill    clozapine (CLOZARIL) 50 MG tablet Take 50 mg by mouth once daily.       dapsone 7.5 % GlwP Apply topically once daily.      dextroamphetamine-amphetamine 30 mg  Tab Take 30 mg by mouth 2 (two) times daily.       famciclovir (FAMVIR) 500 MG tablet Take 1 tablet (500 mg total) by mouth 2 (two) times daily. 30 tablet 12    hydrOXYzine pamoate (VISTARIL) 25 MG Cap Take 2 capsules (50 mg total) by mouth nightly as needed (Take 25-50mg (1-2 caps) at night for anxiety prior to ECT). 180 capsule 0    hydrOXYzine pamoate (VISTARIL) 25 MG Cap Take 1 capsule (25 mg total) by mouth nightly as needed (anxiety/sleep). Take 1 to 2 pills as needed 60 capsule 2    mirtazapine (REMERON) 15 MG tablet Take 1 tablet (15 mg total) by mouth every evening. (Patient taking differently: Take 7.5 mg by mouth every evening. ) 90 tablet 0    spironolactone (ALDACTONE) 50 MG tablet 50 mg 2 (two) times daily. 50  mg qAM, 50 mg qHS.      thyroid (ARMOUR THYROID) 30 mg Tab Take 1 tablet (30 mg total) by mouth every morning. 30 tablet 11    trazodone (DESYREL) 100 MG tablet Take 200 mg by mouth every evening.       UNABLE TO FIND 2 (two) times daily. n-azetyl-cysteine      venlafaxine (EFFEXOR) 100 MG Tab Take 3 tablets (300 mg total) by mouth once daily. (Patient taking differently: Take 225 mg by mouth once daily. )      vortioxetine (TRINTELLIX) 5 mg Tab Take 2.5 mg by mouth once daily.      ZOVIRAX 5 % Crea   5      Allergies:   Benzodiazepines; Ampicillin; Erythromycin; Levaquin [levofloxacin]; Penicillins; Pristiq [desvenlafaxine]; Sulfa (sulfonamide antibiotics); and Azithromycin    Past Medical/Surgical History:   Past Medical History:   Diagnosis Date    Anxiety     Depression     History of psychiatric hospitalization     HSV-1 (herpes simplex virus 1) infection     Hx of psychiatric care     Moderate depressed bipolar II disorder 06/13/2016    reports no history of bipolar    Obsessive-compulsive disorder     Psychiatric problem     Schizophrenia 4/3/2018    Self-harming behavior     Therapy      Past Surgical History:   Procedure Laterality Date    ANKLE SURGERY Right      BREAST augmentation      ELECTROCONVULSIVE THERAPY (ECT) - SINGLE SEIZURE N/A 12/6/2018    Performed [facility] Jr[facility] at [facility] ECT    ELECTROCONVULSIVE THERAPY (ECT) - SINGLE SEIZURE N/A 8/31/2018    Per[facility] by [facility] at [facility] ECT    ELECTROCONVULSIVE THERAPY (ECT) - SINGLE SEIZURE N/A 8/6/2018    Per[facility] by [facility] ECT    ELECTROCONVULSIVE THERAPY (ECT) - SINGLE SEIZURE N/A 7/23/2018    Per[facility] by [facility] at [facility] ECT    ELECTROCONVULSIVE THERAPY (ECT) - SINGLE SEIZURE N/A 7/5/2018    Per[facility] by [facility] at [facility] ECT    ELECTROCONVULSIVE THERAPY (ECT) - SINGLE SEIZURE N/A 6/28/2018    Per[facility] by [facility] Jr[facility] at [facility] ECT    ELECTROCONVULSIVE THERAPY (ECT) - SINGLE SEIZURE N/A 6/13/2018    Per[facility] by [facility] ECT    ELECTROCONVULSIVE THERAPY (ECT) - SINGLE SEIZURE N/A 5/29/2018    Per[facility] ECT    ELECTROCONVULSIVE THERAPY (ECT) - SINGLE SEIZURE N/A 5/8/2018    Per[facility] by [facility] ECT    ELECTROCONVULSIVE THERAPY (ECT) - SINGLE SEIZURE N/A 4/24/2018    Performed by [facility] ECT    ELECTROCONVULSIVE THERAPY (ECT) - SINGLE SEIZURE N/A 4/17/2018    Performed by [facility] ECT    ELECTROCONVULSIVE THERAPY (ECT) - SINGLE SEIZURE N/A 4/13/2018    Performed by [facility] at [facility] ECT    ELECTROCONVULSIVE THERAPY (ECT) - SINGLE SEIZURE N/A 4/3/2018    Performed by [facility]ib [facility] at [facility] ECT    ELECTROCONVULSIVE THERAPY (ECT) - SINGLE SEIZURE N/A 3/20/2018    Performed by [facility]ib [facility] at [facility] ECT    ELECTROCONVULSIVE THERAPY (ECT) - SINGLE SEIZURE N/A 3/15/2018    Performed by [facility] at [facility] ECT    ELECTROCONVULSIVE THERAPY (ECT) - SINGLE SEIZURE N/A 3/6/2018    Per[facility] by [facility] at [facility]  ECT    ELECTROCONVULSIVE THERAPY (ECT) - SINGLE SEIZURE N/A 3/1/2018    Performed by [facility] at [facility] ECT    ELECTROCONVULSIVE THERAPY (ECT) - SINGLE SEIZURE N/A 2/23/2018    Performed by Sh[facility]ib [facility] Jr[facility] at [facility] ECT    ELECTROCONVULSIVE THERAPY (ECT) - SINGLE SEIZURE N/A 2/19/2018    Performed by Sh[facility]ib [facility] Jr[facility] at [facility] ECT    ELECTROCONVULSIVE THERAPY (ECT) - SINGLE SEIZURE N/A 2/15/2018    Performed by Sh[facility]ib [facility]ce Jr[facility] at [facility] ECT    ELECTROCONVULSIVE THERAPY (ECT) - SINGLE SEIZURE N/A 1/22/2018    Performed by Sh[facility]ib Boyce Jr[facility] at [facility] ECT    ELECTROCONVULSIVE THERAPY (ECT) - SINGLE SEIZURE N/A 12/11/2017    Performed by [facility]ib [facility] Jr[facility] at [facility] ECT    ELECTROCONVULSIVE THERAPY (ECT) - SINGLE SEIZURE N/A 10/24/2017    Performed by Sh[facility]ib [facility] Jr[facility] at [facility] ECT    ELECTROCONVULSIVE THERAPY (ECT) - SINGLE SEIZURE N/A 9/20/2017    Performed by Shoaib Boyce Jr[facility] at [facility] ECT    ELECTROCONVULSIVE THERAPY (ECT) - SINGLE SEIZURE N/A 8/14/2017    Performed by Sh[facility]ib [facility] Jr[facility] at [facility] ECT    ELECTROCONVULSIVE THERAPY (ECT) - SINGLE SEIZURE Bilateral 7/12/2017    Performed by Shoaib Boyce Jr[facility] at [facility] ECT    ELECTROCONVULSIVE THERAPY (ECT) - SINGLE SEIZURE N/A 6/13/2017    Performed by Shoaib Boyce Jr[facility] at [facility] ECT    ELECTROCONVULSIVE THERAPY (ECT) - SINGLE SEIZURE N/A 5/17/2017    Performed by Shoaib Boyce Jr[facility] at [facility] ECT    ELECTROCONVULSIVE THERAPY (ECT) - SINGLE SEIZURE N/A 4/20/2017    Performed by Shoaib Boyce Jr[facility] at [facility] ECT    ELECTROCONVULSIVE THERAPY (ECT) - SINGLE SEIZURE N/A 11/22/2016    Performed by Shoaib Boyce Jr[facility] at [facility] ECT    ELECTROCONVULSIVE THERAPY (ECT) - SINGLE SEIZURE N/A 10/24/2016    Performed by Shoaib Boyce Jr[facility] at [facility] ECT    ELECTROCONVULSIVE THERAPY (ECT) - SINGLE SEIZURE N/A 9/22/2016    Performed by [facility] Jr[facility] at [facility] ECT     ELECTROCONVULSIVE THERAPY (ECT) - SINGLE SEIZURE N/A 9/1/2016    Performed by [facility] Jr[facility] at [facility] ECT    ELECTROCONVULSIVE THERAPY (ECT) - SINGLE SEIZURE N/A 8/15/2016    Performed by Shoaib Boyce Jr[facility] at [facility] ECT    ELECTROCONVULSIVE THERAPY (ECT) - SINGLE SEIZURE N/A 8/1/2016    Performed by Shoaib Boy[facility] Jr[facility] at [facility] ECT    ELECTROCONVULSIVE THERAPY (ECT) - SINGLE SEIZURE N/A 7/22/2016    Performed by Shoaib [facility]ce Jr[facility] at [facility] ECT    ELECTROCONVULSIVE THERAPY (ECT) - SINGLE SEIZURE N/A 7/14/2016    Performed by Sh[facility]ib [facility]ce Jr[facility] at [facility] ECT    ELECTROCONVULSIVE THERAPY (ECT) - SINGLE SEIZURE N/A 7/8/2016    Performed by Sh[facility]ib [facility] Jr[facility] at [facility] ECT    ELECTROCONVULSIVE THERAPY (ECT) - SINGLE SEIZURE N/A 7/5/2016    Performed by [facility]ib [facility] Jr[facility] at [facility] ECT    ELECTROCONVULSIVE THERAPY (ECT) - SINGLE SEIZURE N/A 6/27/2016    Performed by Sh[facility]ib [facility] Jr[facility] at [facility] ECT    ELECTROCONVULSIVE THERAPY (ECT) - SINGLE SEIZURE N/A 6/23/2016    Performed by [facility] Jr[facility] at [facility] ECT    ELECTROCONVULSIVE THERAPY (ECT) - SINGLE SEIZURE N/A 6/20/2016    Performed by [facility] Jr[facility] at [facility] ECT    ELECTROCONVULSIVE THERAPY (ECT) - SINGLE SEIZURE N/A 6/16/2016    Performed by [facility] Jr[facility] at [facility] ECT    ELECTROCONVULSIVE THERAPY (ECT) - SINGLE SEIZURE N/A 6/15/2016    Performed by [facility] Jr[facility] at [facility] ECT    ELECTROCONVULSIVE THERAPY (ECT) - SINGLE SEIZURE N/A 6/14/2016    Performed by [facility] at [facility] ECT    ELECTROCONVULSIVE THERAPY (ECT) - SINGLE SEIZURE N/A 6/13/2016    Performed by [facility] at [facility] ECT    ELECTROCONVULSIVE THERAPY (ECT) - SINGLE SEIZURE N/A 1/4/2016    Performed by [facility] Jr[facility] at [facility] ECT    ELECTROCONVULSIVE THERAPY, CEREBRAL HEMISPHERE, UNILATERAL, 1 SEIZURE Bilateral 6/25/2018    Per[facility] by [facility] at  [facility] ECT    OVARIAN CYST REMOVAL Bilateral       OBJECTIVE:     Vitals (pre-procedure):  Vitals:    07/18/19 0617   BP: 107/67   Pulse: 83   Resp: 18   Temp: 97.9 °F (36.6 °C)        Labs/Imaging/Studies:   No results found for this or any previous visit (from the past 48 hour(s)).   No results found for: PHENYTOIN, PHENOBARB, VALPROATE, CBMZ    Physical Exam:   Gen: AAOx4, NAD  HEENT: MMM, PERRL, EOMI, O/P clear  CV: RRR, S1/S2 nml, no M/R/G  Chest: CTAB, no R/R/W, unlabored breathing  Abd: S/NT/ND, +BS, no HSM  Ext: No C/C/E, pulse 2+ throughout  Neuro: CN II-XII grossly intact, no focal deficits    Mental Status Exam:   Appearance: unremarkable, age appropriate, neatly groomed  Behavior: normal, cooperative, eye contact normal  Speech: normal tone, normal rate, normal pitch, normal volume, spontaneous  Mood: depressed  Affect: full & reactive  Thought Process: normal and logical  Thought Content: normal, no suicidality, no homicidality, delusions, or paranoia  Cognition: grossly intact  Insight: fair  Judgment: good    ASSESSMENT/PLAN:     Allyssa Wright is a 40 y.o. female with Major Depressive Disorder, recurrent, in partial remission who presents for ECT.    Recommendations:   Proceed with ECT #76.      Boaz Lo MD  [facility]  PGY-2  7/18/2019   no

## 2022-09-08 NOTE — PROGRESS NOTE ADULT - SUBJECTIVE AND OBJECTIVE BOX
Diabetes Follow up note:    Chief complaint: T2DM    Interval Hx: BG values 200s over past 24 hours. Pt seen at bedside. In chair and awake today. Reports eating well, noted to have more than 50% of Palestinian toast consumed + other items. Awaiting therapeutic INR, dc planning to rehab.     Review of Systems:  General: denies pain  GI: Tolerating POs. Denies N/V/D/Abd pain  CV: Denies CP/SOB  ENDO: No S&Sx of hypoglycemia    MEDS:  finasteride 5 milliGRAM(s) Oral daily  insulin glargine Injectable (LANTUS) 3 Unit(s) SubCutaneous at bedtime  insulin lispro (ADMELOG) corrective regimen sliding scale   SubCutaneous three times a day before meals  insulin lispro (ADMELOG) corrective regimen sliding scale   SubCutaneous at bedtime  insulin lispro Injectable (ADMELOG) 8 Unit(s) SubCutaneous before lunch  insulin lispro Injectable (ADMELOG) 8 Unit(s) SubCutaneous before dinner  insulin lispro Injectable (ADMELOG) 10 Unit(s) SubCutaneous before breakfast  levothyroxine 50 MICROGram(s) Oral daily  predniSONE   Tablet 10 milliGRAM(s) Oral daily    nystatin    Suspension 272607 Unit(s) Oral four times a day  trimethoprim   80 mG/sulfamethoxazole 400 mG 1 Tablet(s) Oral daily  valGANciclovir 450 milliGRAM(s) Oral <User Schedule>    Allergies    No Known Allergies      PE:  General: Male sitting in chair. NAD.   Vital Signs Last 24 Hrs  T(C): 36.6 (08 Sep 2022 09:08), Max: 36.9 (08 Sep 2022 01:00)  T(F): 97.9 (08 Sep 2022 09:08), Max: 98.5 (08 Sep 2022 01:00)  HR: 65 (08 Sep 2022 09:08) (59 - 77)  BP: 169/73 (08 Sep 2022 09:08) (136/65 - 185/65)  BP(mean): --  RR: 20 (08 Sep 2022 09:08) (18 - 20)  SpO2: 95% (08 Sep 2022 09:08) (94% - 99%)    Parameters below as of 08 Sep 2022 09:08  Patient On (Oxygen Delivery Method): room air      Abd: Soft, NT,ND,   Extremities: Warm  Neuro: Alert. Able to answer questions today.     LABS:  POCT Blood Glucose.: 260 mg/dL (09-08-22 @ 08:12)  POCT Blood Glucose.: 264 mg/dL (09-07-22 @ 22:14)  POCT Blood Glucose.: 202 mg/dL (09-07-22 @ 17:14)  POCT Blood Glucose.: 235 mg/dL (09-07-22 @ 12:34)  POCT Blood Glucose.: 107 mg/dL (09-07-22 @ 08:22)  POCT Blood Glucose.: 82 mg/dL (09-07-22 @ 07:36)  POCT Blood Glucose.: 237 mg/dL (09-06-22 @ 21:29)  POCT Blood Glucose.: 365 mg/dL (09-06-22 @ 17:04)  POCT Blood Glucose.: 249 mg/dL (09-06-22 @ 12:21)  POCT Blood Glucose.: 150 mg/dL (09-06-22 @ 07:59)  POCT Blood Glucose.: 219 mg/dL (09-05-22 @ 21:19)  POCT Blood Glucose.: 149 mg/dL (09-05-22 @ 17:19)  POCT Blood Glucose.: 193 mg/dL (09-05-22 @ 12:16)                            8.1    6.34  )-----------( 405      ( 08 Sep 2022 06:02 )             25.9       09-08    131<L>  |  91<L>  |  56<H>  ----------------------------<  260<H>  4.0   |  22  |  3.56<H>    eGFR: 18<L>    Ca    8.5      09-08  Mg     2.0     09-08  Phos  4.8     09-08    TPro  6.5  /  Alb  2.7<L>  /  TBili  0.3  /  DBili  0.2  /  AST  22  /  ALT  28  /  AlkPhos  577<H>  09-08        A1C with Estimated Average Glucose Result: 6.4 % (09-04-22 @ 06:08)  A1C with Estimated Average Glucose Result: 6.0 % (06-30-22 @ 05:49)  A1C with Estimated Average Glucose Result: 6.6 % (04-24-22 @ 17:12)  A1C with Estimated Average Glucose Result: 7.3 % (02-04-22 @ 13:42)  A1C with Estimated Average Glucose Result: 7.2 % (02-04-22 @ 05:48)          Contact number: serge [phone] or [phone]

## 2022-09-08 NOTE — PROGRESS NOTE ADULT - NUTRITIONAL ASSESSMENT
This patient has been assessed with a concern for Malnutrition and has been determined to have a diagnosis/diagnoses of Severe protein-calorie malnutrition.    This patient is being managed with:   Diet Consistent Carbohydrate w/Evening Snack-  1000mL Fluid Restriction ([id number])  No Concentrated Phosphorus  Supplement Feeding Modality:  Oral  Nepro Cans or Servings Per Day:  3       Frequency:  Daily  Entered: Aug 29 2022  4:09PM

## 2022-09-08 NOTE — PROGRESS NOTE ADULT - SUBJECTIVE AND OBJECTIVE BOX
[facility] DIVISION OF KIDNEY DISEASES AND HYPERTENSION -- FOLLOW UP NOTE  --------------------------------------------------------------------------------      HPI: 66y old  Male h/o DM type II, HTN, CAD s/p CABG in 2020, Afib on Eliquis, CVA in 2019 due to Afib, Hypothyroidism, Seizure d/o, h/o GI bleed in 2/2022 EGD with duodenal ulcer and ESRD on HD s/p R DDRT from DCD donor on 4/21/22 complicated by DGF requiring HD until 4/29/22, later had good graft function who was readmitted with status epilepticus in setting of UTI/antibiotics and sub-therapeutic valproic acid level.  Envarsus was discontinued for seizures and poor mental status and he was started on belatacept. Subsequent ANA due to rejection. 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 Received pulse steroids (Solumedrol 500 on 7/30 -> 250 on 7/31). Did not receive thymo due to frailty. Currently HD dependent.     Pt. seen and examined at bedside. Pt. denies any chest pain. shortness of breath, nausea, vomiting, or abdominal pain. Last HD on 9/7/22. Tolerated well.     Standing Inpatient Medications  amLODIPine   Tablet 10 milliGRAM(s) Oral daily  artificial  tears Solution 2 Drop(s) Left EYE every 6 hours  brivaracetam 50 milliGRAM(s) Oral two times a day  carvedilol 12.5 milliGRAM(s) Oral every 12 hours  chlorhexidine 2% Cloths 1 Application(s) Topical <User Schedule>  chlorhexidine 4% Liquid 1 Application(s) Topical <User Schedule>  doxazosin 4 milliGRAM(s) Oral <User Schedule>  epoetin cortney-epbx (RETACRIT) Injectable 01527 Unit(s) SubCutaneous <User Schedule>  finasteride 5 milliGRAM(s) Oral daily  furosemide    Tablet 80 milliGRAM(s) Oral <User Schedule>  heparin   Injectable 5000 Unit(s) SubCutaneous every 12 hours  insulin glargine Injectable (LANTUS) 3 Unit(s) SubCutaneous at bedtime  insulin lispro (ADMELOG) corrective regimen sliding scale   SubCutaneous three times a day before meals  insulin lispro (ADMELOG) corrective regimen sliding scale   SubCutaneous at bedtime  insulin lispro Injectable (ADMELOG) 9 Unit(s) SubCutaneous before dinner  insulin lispro Injectable (ADMELOG) 8 Unit(s) SubCutaneous before lunch  insulin lispro Injectable (ADMELOG) 10 Unit(s) SubCutaneous before breakfast  levothyroxine 50 MICROGram(s) Oral daily  melatonin 6 milliGRAM(s) Oral at bedtime  multivitamin 1 Tablet(s) Oral daily  mycophenolate mofetil 500 milliGRAM(s) Oral <User Schedule>  nystatin    Suspension 871595 Unit(s) Oral four times a day  pantoprazole    Tablet 40 milliGRAM(s) Oral <User Schedule>  phenytoin   Capsule 200 milliGRAM(s) Oral two times a day  polyethylene glycol 3350 17 Gram(s) Oral daily  predniSONE   Tablet 10 milliGRAM(s) Oral daily  senna 2 Tablet(s) Oral at bedtime  sirolimus 7 milliGRAM(s) Oral <User Schedule>  trimethoprim   80 mG/sulfamethoxazole 400 mG 1 Tablet(s) Oral daily  valGANciclovir 450 milliGRAM(s) Oral <User Schedule>    PRN Inpatient Medications  acetaminophen     Tablet .. 650 milliGRAM(s) Oral every 6 hours PRN  diphenhydrAMINE 25 milliGRAM(s) Oral at bedtime PRN  ondansetron Injectable 4 milliGRAM(s) IV Push once PRN  sodium chloride 0.9% lock flush 10 milliLiter(s) IV Push every 1 hour PRN        VITALS/PHYSICAL EXAM  --------------------------------------------------------------------------------  T(C): 36.6 (09-08-22 @ 09:08), Max: 36.9 (09-08-22 @ 01:00)  HR: 65 (09-08-22 @ 09:08) (59 - 77)  BP: 169/73 (09-08-22 @ 09:08) (136/65 - 185/65)  RR: 20 (09-08-22 @ 09:08) (18 - 20)  SpO2: 95% (09-08-22 @ 09:08) (94% - 99%)  Wt(kg): --        09-07-22 @ 07:01  -  09-08-22 @ 07:00  --------------------------------------------------------  IN: 1240 mL / OUT: 2850 mL / NET: -1610 mL      Physical Exam:  	Gen: ill appearing  	Head & Neck: pink conjunctiva no scleral icterus, moist oral mucosa, no thrush   	Pulm: lungs clear to auscultation bilaterally, normal respiratory effort, no use of accessory muscles.   	CV: Regular rate and rhythm, S1 and S2 normal, no murmur   	Abd: normal bowel sounds, soft, nontender/nondistended                      Transplant: No tenderness, swelling, bruit   	: No suprapubic tenderness, no CVA or flank tenderness           Extremities: no edema, distal pulses 2+           Skin: warm, no rash, no cyanosis           Neuro: Alert and oriented to person, place and time. No focal deficits. No tremors or asterexis, normal speech, normal affect.     LABS/STUDIES  --------------------------------------------------------------------------------              8.1    6.34  >-----------<  405      [09-08-22 @ 06:02]              25.9     131  |  91  |  56  ----------------------------<  260      [09-08-22 @ 06:02]  4.0   |  22  |  3.56        Ca     8.5     [09-08-22 @ 06:02]      Mg     2.0     [09-08-22 @ 06:02]      Phos  4.8     [09-08-22 @ 06:02]    TPro  6.5  /  Alb  2.7  /  TBili  0.3  /  DBili  0.2  /  AST  22  /  ALT  28  /  AlkPhos  577  [09-08-22 @ 06:02]    PT/INR: PT 35.4 , INR 3.02       [09-08-22 @ 06:02]  PTT: 40.8       [09-08-22 @ 06:02]      Creatinine Trend:  SCr 3.56 [09-08 @ 06:02]  SCr 5.03 [09-07 @ 06:32]  SCr 4.13 [09-06 @ 06:21]  SCr 5.62 [09-05 @ 06:20]  SCr 4.70 [09-04 @ 06:07]        Sirolimus (Rapamycin) Level, Serum: 4.8 ng/mL (09-08 @ 06:02)  Sirolimus (Rapamycin) Level, Serum: 5.2 ng/mL (09-07 @ 06:29)  Sirolimus (Rapamycin) Level, Serum: 5.4 ng/mL (09-06 @ 06:23)  Sirolimus (Rapamycin) Level, Serum: 6.6 ng/mL (09-05 @ 06:20)          CAPILLARY BLOOD GLUCOSE      POCT Blood Glucose.: 201 mg/dL (08 Sep 2022 12:01)  POCT Blood Glucose.: 260 mg/dL (08 Sep 2022 08:12)  POCT Blood Glucose.: 264 mg/dL (07 Sep 2022 22:14)  POCT Blood Glucose.: 202 mg/dL (07 Sep 2022 17:14)      Urinalysis - [08-16-22 @ 18:49]      Color Yellow / Appearance Slightly Turbid / SG 1.018 / pH 8.5      Gluc 200 mg/dL / Ketone Negative  / Bili Negative / Urobili Negative       Blood Large / Protein >600 / Leuk Est Moderate / Nitrite Positive      RBC 13 / WBC 40 / Hyaline 7 / Gran  / Sq Epi  / Non Sq Epi 4 / Bacteria Moderate      Iron 17, TIBC 171, %sat 10      [05-02-22 @ 13:03]  Ferritin 6336      [07-27-22 @ 13:00]  PTH -- (Ca 9.2)      [02-05-22 @ 10:13]   294  HbA1c 6.0      [02-21-20 @ 08:53]  TSH 4.69      [07-06-22 @ 18:36]  Lipid: chol --, , HDL --, LDL --      [07-27-22 @ 13:00]    HBsAg Nonreact      [09-07-22 @ 06:32]  HCV 0.19, Nonreact      [09-07-22 @ 06:32]

## 2022-09-08 NOTE — PROGRESS NOTE ADULT - PROBLEM SELECTOR PROBLEM 1
ANA (acute kidney injury)
Renal transplant recipient
Renal transplant recipient
Type 2 diabetes mellitus with hypoglycemia, with long-term current use of insulin
Type 2 diabetes mellitus with hypoglycemia, with long-term current use of insulin
Renal transplant recipient
Type 2 diabetes mellitus with hypoglycemia, with long-term current use of insulin
Hemothorax
Hypothyroidism
Hypothyroidism
Renal transplant recipient
Type 2 diabetes mellitus with hypoglycemia, with long-term current use of insulin
Hemothorax
Renal transplant recipient
Type 2 diabetes mellitus with hypoglycemia, with long-term current use of insulin
Hemothorax
Renal transplant recipient
Type 2 diabetes mellitus with hypoglycemia, with long-term current use of insulin
Hypothyroidism
Renal transplant recipient
Type 2 diabetes mellitus with hypoglycemia, with long-term current use of insulin
Renal transplant recipient
Type 2 diabetes mellitus with hypoglycemia, with long-term current use of insulin
Renal transplant recipient
Renal transplant recipient
Type 2 diabetes mellitus with hypoglycemia, with long-term current use of insulin
Hemothorax
Renal transplant recipient
Type 2 diabetes mellitus with hypoglycemia, with long-term current use of insulin
Renal transplant recipient
Type 2 diabetes mellitus with hypoglycemia, with long-term current use of insulin
Renal transplant recipient
Type 2 diabetes mellitus with hypoglycemia, with long-term current use of insulin
Renal transplant recipient
Type 2 diabetes mellitus with hypoglycemia, with long-term current use of insulin
Renal transplant recipient
Type 2 diabetes mellitus with hypoglycemia, with long-term current use of insulin
Type 2 diabetes mellitus with hypoglycemia, with long-term current use of insulin
Renal transplant recipient
Type 2 diabetes mellitus with hypoglycemia, with long-term current use of insulin
Renal transplant recipient
Renal transplant recipient
Type 2 diabetes mellitus with hypoglycemia, with long-term current use of insulin
Renal transplant recipient
Renal transplant recipient

## 2022-09-08 NOTE — PROGRESS NOTE ADULT - PROBLEM SELECTOR PLAN 4
Anemia in the setting of renal disease. Continue with EPO 10K units three times per week with HD. Hg below goal at this time. Monitor. Hg.     If any questions, please feel free to contact me     Steven Hyatt  Nephrology Fellow  [facility] Pager: [phone].

## 2022-09-08 NOTE — DISCHARGE NOTE NURSING/CASE MANAGEMENT/SOCIAL WORK - NSDCVIVACCINE_GEN_ALL_CORE_FT
Tdap; 24-Jun-2021 20:00; Marcela Horn (RN); [employer]; [id number] (Exp. Date: 04-Sep-2022); IntraMuscular; Deltoid Left.; 0.5 milliLiter(s); VIS (VIS Published: 09-May-2013, VIS Presented: 24-Jun-2021);   Tdap; 09-Mar-2022 07:35; Wilson Agrawal (RN); [employer]; [id number] (Exp. Date: 28-Sep-2023); IntraMuscular; Deltoid Left.; 0.5 milliLiter(s); VIS (VIS Published: 09-May-2013, VIS Presented: 09-Mar-2022);

## 2022-09-08 NOTE — PROGRESS NOTE ADULT - NS ATTEND OPT1 GEN_ALL_CORE

## 2022-09-08 NOTE — H&P ADULT - NSHPREVIEWOFSYSTEMS_GEN_ALL_CORE
REVIEW OF SYSTEMS  Constitutional: No fever, No Chills, No fatigue  HEENT: No eye pain, No visual disturbances, No difficulty hearing  Pulm: No cough,  No shortness of breath  Cardio: No chest pain, No palpitations  GI:  No abdominal pain, No nausea, No vomiting, No diarrhea, No constipation  : No dysuria, No frequency, No hematuria  Neuro: No headaches, No memory loss, No loss of strength, No numbness, No tremors  Skin: No itching, No rashes, No lesions   Endo: No temperature intolerance  MSK: No joint pain, No joint swelling, No muscle pain, No Neck or back pain  Psych:  No depression, No anxiety REVIEW OF SYSTEMS  Constitutional: No fever, No Chills, + fatigue  HEENT: No eye pain, No visual disturbances, No difficulty hearing  Pulm: No cough,  No shortness of breath  Cardio: No chest pain, No palpitations  GI:  No abdominal pain, No nausea, No vomiting, No diarrhea, No constipation  : No dysuria, No frequency, No hematuria  Neuro: No headaches, + memory loss, + loss of strength, No numbness, No tremors  Skin: No itching, No rashes, No lesions   Endo: No temperature intolerance  MSK: No joint pain, No joint swelling, + muscle pain, No Neck or back pain  Psych:  No depression, No anxiety

## 2022-09-08 NOTE — PATIENT PROFILE ADULT - FALL HARM RISK - HARM RISK INTERVENTIONS

## 2022-09-08 NOTE — PROGRESS NOTE ADULT - ASSESSMENT
66M with h/o DM type II, HTN, CAD s/p CABG in 2020, Afib on Eliquis, CVA in 2019 due to Afib, Seizure d/o (last episode was on 4/8/22), h/o GI bleed in 2/2022 EGD with duodenal ulcer and ESRD on HD s/p R DDRT from DCD donor on 4/21/22 complicated by DGF requiring HD until 4/29/22 who presented with status epilepticus in setting of UTI/antibiotics and sub-therapeutic valproic acid level. Transferred to SICU on 7/25 with signs of sepsis, now with refractory seizures. Last reported seizure on 8/16. Mental status continuing to improve.     Seizure Disorder:  - Neuro/Epilepsy Teams following. Avoid Fycompa 2/2 agitation in past  - 1 min seizure with clonic movements noted on EEG 8/16. No further seizures noted.  EEG 8/16-8/18  - Continue additional anti-seizure medication Briviact 50mg BID with additional 50mg post each HD session  - On Phenytoin and Briviact  - Appreciate neuro recommendations to wean and discontinue Phenytoin and to continue Briviact.  Given prolonged hospital course and current stable regimen, would prefer to make medication adjustments as outpatient.  Discussed with Neuro  - prior MRI head 6/27 with less concerns for PRES, likely ischemic changes  - o/n CTH 8/3 with no acute findings  - Follow up with neuro regarding seizure medications recs for discharge      R DCD DDRT 4/21/22 c/b 2A ACR  - 2A ACR: s/p pulse steroids. Continue Prednisone 10mg QD  - HD/UF as per nephrology for fluid overload/anuria   - Lasix 80mg on off HD days  - 8/14 surveillance BCx negative  - Repeated US from 8/25 did not show any renal artery stenosis, but increased resistive index, consistent with parenchymal disease was noted  - S/P removal of ureteral stent on 7/12  - 8/8 Punch biopsy wound Culture: ngtd. Negative for PTLD  - fungitel negative  - Liver US for uptrending alk phos 9/6: Borderline hepatomegaly. No masses seen. Patent portal and hepatic veins.   - LE studies WNL    RLQ Cellulitis:  - Deferring right psoas muscle/fascia biopsy as wound is not worsening  - Complete course of Keflex (8/20 - 9/4)   - Improved    Immunosuppression:   - Belatacept: 6/23-8/1 discontinued  - Sirolimus 7 mg QD- Level reviewed   - Cellcept 500 BID.   - Pred 10mg QD  - PPX: Nystatin, bactrim, valcyte (MWF)    DM  - Labile blood glucose  - On Lantus 5U and pre-meal 10/8/8U with ISS coverage  - Fingerstick ac and qhs  - Endo recs appreciated    AFib/R IJ DVT   - Full ac transitioned to coumadin on 8/31 dose daily per INR goal >2 -2.5  - coumadin 8 mg last night, Coumadin up to 3.02, will decrease to 7 on discharge and will be followed in [place]  - rate controlled  - SQH 5000u BID for DVT ppx while INR subtherapeutic   - Eliquis with ~40% less efficacy while on fosphenytoin    HTN:  - Amlodipine/Doxazosin. Coreg 12.5 BID    BPH:  - Continue Doxazosin/Proscar    Dispo:  - Plan for acute rehab, likely this afternoon

## 2022-09-08 NOTE — H&P ADULT - NS ATTEND AMEND GEN_ALL_CORE FT
Seen and examined with NP    66 yr old Male complex medical hx, including ESRD s/p Renal transplant Right DDRT (4/21/22)   Subsequent hosp admission 6/10/22 for sepsis, complicated by pleural effusion s/p thoracocentesis, Rt IJ DVT, recurrent seizures and RLQ abdominal cellulitis, and subsequent renal failure, HD recommenced 7/29/22 Acute rehab admission 9/8    Living with family, prior to hosp admission living with family, independent with ADLs, ground floor with few stairs to enter  Reports Rt hemiparesis post stroke  Post hosp care, ambulating minimally 15ft with RW    At present report pain left leg worse with movement    Frail, with low energy  Afebrile, voice is low tone  Chest--dcreased air entry, healed scars on both lat chest regions  Abd--hyperpigmented RLQ with healed surgical scar  Ext--hyperpigmentation both lower legs     MMT UE 4/5, LE 3+/5    RECENT LABS/IMAGING                        8.4    6.41  )-----------( 423      ( 09 Sep 2022 06:30 )             26.3     09-09    130<L>  |  90<L>  |  84<H>  ----------------------------<  156<H>  4.6   |  23  |  5.12<H>    Ca    8.6      09 Sep 2022 06:30  Phos  4.8     09-08  Mg     2.0     09-08    TPro  6.7  /  Alb  1.9<L>  /  TBili  0.4  /  DBili  x   /  AST  25  /  ALT  30  /  AlkPhos  515<H>  09-09    PT/INR - ( 09 Sep 2022 06:30 )   PT: 33.1 sec;   INR: 2.82 ratio         PTT - ( 08 Sep 2022 06:02 )  PTT:40.8 sec      Dx- Debility due to sepsis (pleural effusion s/p drainage)  Background of recent renal transplant  Frail with low energy  Commence PT/OT  Nutrition assessment  CXR f/u recent effusion   F/u ID recs post review      Liaison with Transplant team--C/w sirolimus, and other protocol as per Transplant team    ID consult--review RLQ recently treated cellulitis (completed antibiotics 9/4) and overall review of post transplant patient    DVT ppx--c/w coumadin  (to reduce drug interactiion with AED)  GI protection    Est dc to be determined at next team conference

## 2022-09-08 NOTE — PROGRESS NOTE ADULT - PROBLEM SELECTOR PLAN 3
Continue with LT4 50 mcg daily   - Please make sure LT4 is given on an empty stomach at least one hour apart from other meds and food to ensure med absorption. DO NOT GIVE WITH VITAMINS/ANTACIDS  - If unable to ensure proper time administration switching to IV dosing in order to ensure med absorption. Synthroid 37.5mcg IV   Monitor TFTs outpatient. TSH can be skewed when critically ill.      Can be reached via TEAMS/pager: [phone]   office:  [phone] (M-F 9a-5pm)               [phone] (nights/weekends)   [email or web address] password [email or web address]

## 2022-09-08 NOTE — PROGRESS NOTE ADULT - NS ATTEND BILL GEN_ALL_CORE
Attending to bill
PA/NP to bill
Attending to bill
PA/NP to bill
Attending to bill

## 2022-09-08 NOTE — PROGRESS NOTE ADULT - PROBLEM SELECTOR PROBLEM 4
ANA (acute kidney injury)
HLD (hyperlipidemia)
Anemia
Anemia
HLD (hyperlipidemia)
Anemia
HLD (hyperlipidemia)
HLD (hyperlipidemia)
UTI (urinary tract infection)
ANA (acute kidney injury)
ANA (acute kidney injury)
Anemia
Delirium
HLD (hyperlipidemia)
HLD (hyperlipidemia)
Hypothyroidism
UTI (urinary tract infection)
Anemia
HLD (hyperlipidemia)
HTN (hypertension)
UTI (urinary tract infection)
UTI (urinary tract infection)
HLD (hyperlipidemia)
HLD (hyperlipidemia)
Hypothyroidism
UTI (urinary tract infection)
Delirium
ANA (acute kidney injury)
Anemia
Anemia
HLD (hyperlipidemia)
Delirium
HLD (hyperlipidemia)
Hypothyroidism
UTI (urinary tract infection)
HLD (hyperlipidemia)
UTI (urinary tract infection)
ANA (acute kidney injury)
Anemia
HLD (hyperlipidemia)
UTI (urinary tract infection)
HLD (hyperlipidemia)
UTI (urinary tract infection)
UTI (urinary tract infection)

## 2022-09-08 NOTE — H&P ADULT - NSHPPHYSICALEXAM_GEN_ALL_CORE
PHYSICAL EXAM  VITALS  T(C): 36.7 (09-08-22 @ 17:31), Max: 36.9 (09-08-22 @ 01:00)  HR: 64 (09-08-22 @ 17:31) (62 - 77)  BP: 147/68 (09-08-22 @ 17:31) (128/63 - 169/75)  RR: 16 (09-08-22 @ 17:31) (16 - 20)  SpO2: 94% (09-08-22 @ 17:31) (94% - 99%)    Gen - NAD, Comfortable  HEENT - NCAT, EOMI, MMM  Neck - Supple, No limited ROM  Pulm - CTAB, No wheeze, No rhonchi, No crackles  Cardiovascular - RRR, S1S2, No murmurs  Chest - good chest expansion, good respiratory effort,  left chest wall permcath  Abdomen - Soft, NT/ND, +BS  Extremities - No Cyanosis, no clubbing, trace edema to b/l ankles, no calf tenderness  Neuro-     Cognitive - awake, alert, oriented to person and place.     Communication - Fluent     Attention: Intact, able to state days of week chronologically and backwards. Able to perform simple additions and subtractions     Judgement: Good evidence of judgement     Memory: Recall 3 objects immediate, 1/3 after 3 minutes independently and 2/3 with cue     Cranial Nerves - CN 2-12 intact. No facial asymmetry, Tongue midline, EOMI, Shoulder shrug intact     Motor -                     LEFT    UE - 3+5                    RIGHT UE - 3-/5                    LEFT    LE - 3+/5                    RIGHT LE - 3-/5        Sensory - Intact to LT bilaterally     Reflexes - DTR Intact     Coordination - FTN  impaired to right side     Tone - normal  MSK: generalized weakness  Psychiatric - Mood stable, Affect WNL  Skin:  stage 2 pressure ulcer to sacrum 0.3 x 0.2, dark brown discoloration to RLQ of abdomen PHYSICAL EXAM  VITALS  T(C): 36.7 (09-08-22 @ 17:31), Max: 36.9 (09-08-22 @ 01:00)  HR: 64 (09-08-22 @ 17:31) (62 - 77)  BP: 147/68 (09-08-22 @ 17:31) (128/63 - 169/75)  RR: 16 (09-08-22 @ 17:31) (16 - 20)  SpO2: 94% (09-08-22 @ 17:31) (94% - 99%)    Gen - NAD, Comfortable  HEENT - NCAT, EOMI, MMM  Neck - Supple, No limited ROM  Pulm - CTAB, No wheeze, No rhonchi, No crackles  Cardiovascular - RRR, S1S2, No murmurs  Chest - good chest expansion, good respiratory effort,  left chest wall permcath  Abdomen - Soft, NT/ND, +BS  Extremities - No Cyanosis, no clubbing, trace edema to b/l ankles, no calf tenderness  Neuro-     Cognitive - awake, alert, oriented to person and place. Delayed processing     Communication - Fluent     Attention: Intact, able to state days of week chronologically and backwards. Able to perform simple additions and subtractions     Memory: Recall 3 objects immediate, 1/3 after 3 minutes independently and 2/3 with cue     Cranial Nerves - CN 2-12 intact. No facial asymmetry, Tongue midline, EOMI, Shoulder shrug intact     Motor -                     LEFT    UE - 3+5                    RIGHT UE - 3-/5                    LEFT    LE - 3+/5                    RIGHT LE - 3-/5        Sensory - Intact to LT bilaterally     Reflexes - DTR Intact     Coordination - FTN  impaired to right side     Tone - normal  MSK: generalized weakness  Psychiatric - Mood stable, Affect WNL  Skin:  stage 2 pressure ulcer to sacrum 0.3 x 0.2, dark brown discoloration to RLQ of abdomen

## 2022-09-08 NOTE — PROGRESS NOTE ADULT - SUBJECTIVE AND OBJECTIVE BOX
Transplant Surgery - Multidisciplinary Rounds  --------------------------------------------------------------  DDRT (DCD KDPI 82% 1a/1v/1u)   Date: 4/21/2022    Present:   Patient seen and examined with multidisciplinary team including Transplant Surgeon: Dr. Mann,  Transplant Nephrologist: Dr. JONO Lomeli, nephrology fellow, PGY 3 Dr. Chong, transplant NP Altagracia Montelongo and unit RN during am rounds.  Disciplines not in attendance will be notified of the plan.     HPI: 66M with PMH: DM type II, HTN, CAD s/p CABG in 2020, AFib on Eliquis, CVA in 2019 due to Afib, Seizure d/o following CVA, last episode was on 4/8/22, h/o GIB in 2/2022 EGD with duodenal ulcer.  ESRD due to DM was on HD since 2019.   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  Donor was 58 , KDPI 82%, DCD, single vessels and ureter, HLA mismatch 1, 2, 2. No DSA, cPRA 0%. 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 Re-admitted in May for anemia in the setting of large perinephric hematoma s/p evacuation and repair of arterial anastomosis on 5/2/22. Intra operative biopsy showed no rejection, Creatinine ranging ~2mg/dL.    In Rehab:  He was recently dx with klebsiella UTI with Ertapenem - then switched to Levaquin    He also had parainfluenza pneumonia. Was at rehab progressing well.      Hospital course:  - 6/10: transferred from rehab facility for seizure status epilepticus. Intubated for respiratory protection and transferred to MICU.  EEG showed no seizure activity. Neuro consulted.   - 6/11: Extubated. Found to have R pleural effusion. s/p Thoracentesis 1.3L. Eliquis changed to heparin drip.  Post procedure drop in H&H. 5u PRBC, 2 u FFP.   - 6/11 evening: Transferred to SICU from MICU for further care in setting of hypoxia, significant R pleural effusion, anemia and hemodynamic instability  - 6/11 Intubated at 9pm and sedated on Precedex.  CT placed, 600ml blood drained.  1L total overnight, started pressors  - 6/11 Received Protamine for PTT >100 (was on Heparin drip started for AF), 2 u FFP and 5u PRBC total  - 6/13 s/p VATS 2.2L old blood removed; second chest tube placed  - 6/18-19: chest tubes removed  - 6/21: ?PRES vs. chronic ischemic changes on MRI, off Envarsus, switched to Belatacept 6/23 with good graft function  - 6/25: Tx to SICU for refractory seizures, improved with IV antiepileptic regimen  - 7/10: Downgraded from SICU to floor.   - 7/11: OR-->URETERAL STENT REMOVED  - 7/15: ESBL Kleb UTI (50-90K), completed Ertapenem x 3 Days  - 7/25: Tx to SICU for Sepsis/ elevated LFTS  - 7/27: Shiley placed for HD  - 7/28: ongoing fevers -> started Ertapenem/Fluc  - 7/29: HD restarted + 1U PRBC.  IR bx with 2A rejection, started pulse steroids. LFTs have improved  - 8/4: refractory seizures  - 8/8: s/p derm bx with no acute finding  - 8/8: s/p permacath exchange  - 8/16: One seizure on EEG medications changed added brivaracetam  - 8/22: HD, followed by U/S of Transplant kidney  - 8/31 Eliquis changed to coumadin (due to 40% less efficacy while on phenytoin)      Interval Events:  - Afebrile, VSS,   - remains seizure free  - INR 3.02 now    - Last HD 9/5.   - Alk phos stable 577 <-- 527 <-- 524 <--434  - Abd US done no stones/ patent vessels    Immunosuppression   Induction:  Simulect induction                                         Maintenance immunosuppression: Sirolimus 7mg daily, MMF 250mg BID, Pred 10   Ongoing monitoring for signs of rejection.    Potential Discharge date: TBD  Education:  Medications  Plan of care:  Plan to discharge later today to [facility]. Will discharge on Coumadin dose of 7 and this will be followed in [facility]        MEDICATIONS  (STANDING):  amLODIPine   Tablet 10 milliGRAM(s) Oral daily  artificial  tears Solution 2 Drop(s) Left EYE every 6 hours  brivaracetam 50 milliGRAM(s) Oral two times a day  carvedilol 12.5 milliGRAM(s) Oral every 12 hours  chlorhexidine 2% Cloths 1 Application(s) Topical <User Schedule>  chlorhexidine 4% Liquid 1 Application(s) Topical <User Schedule>  doxazosin 4 milliGRAM(s) Oral <User Schedule>  epoetin cortney-epbx (RETACRIT) Injectable 83835 Unit(s) SubCutaneous <User Schedule>  finasteride 5 milliGRAM(s) Oral daily  furosemide    Tablet 80 milliGRAM(s) Oral <User Schedule>  heparin   Injectable 5000 Unit(s) SubCutaneous every 12 hours  insulin glargine Injectable (LANTUS) 3 Unit(s) SubCutaneous at bedtime  insulin lispro (ADMELOG) corrective regimen sliding scale   SubCutaneous three times a day before meals  insulin lispro (ADMELOG) corrective regimen sliding scale   SubCutaneous at bedtime  insulin lispro Injectable (ADMELOG) 8 Unit(s) SubCutaneous before lunch  insulin lispro Injectable (ADMELOG) 8 Unit(s) SubCutaneous before dinner  insulin lispro Injectable (ADMELOG) 10 Unit(s) SubCutaneous before breakfast  levothyroxine 50 MICROGram(s) Oral daily  melatonin 6 milliGRAM(s) Oral at bedtime  multivitamin 1 Tablet(s) Oral daily  mycophenolate mofetil 500 milliGRAM(s) Oral <User Schedule>  nystatin    Suspension 597958 Unit(s) Oral four times a day  pantoprazole    Tablet 40 milliGRAM(s) Oral <User Schedule>  phenytoin   Capsule 200 milliGRAM(s) Oral two times a day  polyethylene glycol 3350 17 Gram(s) Oral daily  predniSONE   Tablet 10 milliGRAM(s) Oral daily  senna 2 Tablet(s) Oral at bedtime  sirolimus 7 milliGRAM(s) Oral <User Schedule>  trimethoprim   80 mG/sulfamethoxazole 400 mG 1 Tablet(s) Oral daily  valGANciclovir 450 milliGRAM(s) Oral <User Schedule>    MEDICATIONS  (PRN):  acetaminophen     Tablet .. 650 milliGRAM(s) Oral every 6 hours PRN Mild Pain (1 - 3)  diphenhydrAMINE 25 milliGRAM(s) Oral at bedtime PRN Insomnia  ondansetron Injectable 4 milliGRAM(s) IV Push once PRN Nausea and/or Vomiting  sodium chloride 0.9% lock flush 10 milliLiter(s) IV Push every 1 hour PRN Pre/post blood products, medications, blood draw, and to maintain line patency      PAST MEDICAL & SURGICAL HISTORY:  Hypertension      Diabetes      Dyslipidemia      CAD (Coronary Artery Disease)  with Stents in 06/2009 and 6/2019, s/p off-pump C3L on 8/13/20      Hypothyroidism      CVA (cerebral vascular accident)  12/13/19 with residual bilateral weakness      Anemia      PEG (percutaneous endoscopic gastrostomy) status  removed July 2020      Intubation of airway performed without difficulty  Dec 2019  CVA c/b status epilepticus requiring intubation and PEG in 12/2019-      ESRD on dialysis  M/W/F      Seizures  after CVA, last seizure was last week 4/07/22      History of insertion of stent into coronary artery bypass graft  2009 and 2019      S/P CABG x 3  off pump C3L on 8/13/20          Vital Signs Last 24 Hrs  T(C): 36.6 (08 Sep 2022 09:08), Max: 36.9 (08 Sep 2022 01:00)  T(F): 97.9 (08 Sep 2022 09:08), Max: 98.5 (08 Sep 2022 01:00)  HR: 65 (08 Sep 2022 09:08) (59 - 77)  BP: 169/73 (08 Sep 2022 09:08) (136/65 - 185/65)  BP(mean): --  RR: 20 (08 Sep 2022 09:08) (18 - 20)  SpO2: 95% (08 Sep 2022 09:08) (94% - 99%)    Parameters below as of 08 Sep 2022 09:08  Patient On (Oxygen Delivery Method): room air        I&O's Summary    07 Sep 2022 07:01  -  08 Sep 2022 07:00  --------------------------------------------------------  IN: 1240 mL / OUT: 2850 mL / NET: -1610 mL                              8.1    6.34  )-----------( 405      ( 08 Sep 2022 06:02 )             25.9     09-08    131<L>  |  91<L>  |  56<H>  ----------------------------<  260<H>  4.0   |  22  |  3.56<H>    Ca    8.5      08 Sep 2022 06:02  Phos  4.8     09-08  Mg     2.0     09-08    TPro  6.5  /  Alb  2.7<L>  /  TBili  0.3  /  DBili  0.2  /  AST  22  /  ALT  28  /  AlkPhos  577<H>  09-08          Review of systems  Gen: No weight changes, fatigue, fevers/chills, weakness  Skin: No rashes  Head/Eyes/Ears/Mouth: No headache; Normal hearing; Normal vision w/o blurriness; No sinus pain/discomfort, sore throat  Respiratory: No dyspnea, cough, wheezing, hemoptysis  CV: No chest pain, PND, orthopnea  GI: Mild abdominal pain at surgical incision site; denies diarrhea, constipation, nausea, vomiting, melena, hematochezia  : No increased frequency, dysuria, hematuria, nocturia  MSK: No joint pain/swelling; no back pain; no edema  Neuro: No dizziness/lightheadedness, weakness, seizures, numbness, tingling  Heme: No easy bruising or bleeding  Endo: No heat/cold intolerance  Psych: No significant nervousness, anxiety, stress, depression  All other systems were reviewed and are negative, except as noted.      PHYSICAL EXAM:  Constitutional: Well developed / well nourished  Neuro: A&O to person/place/time   Eyes: Anicteric, PERRLA  ENMT: nc/at  Neck: supple   Chest: LIJ permcath without exudate/erythema   Respiratory: CTA B/L  Cardiovascular: RRR  Gastrointestinal: Soft, non distended, mild tenderness at the incision site; incision c/d/i; erythema/rash improved   Genitourinary: oliguic  Extremities: SCD's in place and working bilaterally   Vascular: Palpable dp pulses bilaterally  Neurological: A&O x3  Skin: no rashes, ulcerations or lesions;  Musculoskeletal: Moving all extremities  Psychiatric: Responsive

## 2022-09-08 NOTE — PROGRESS NOTE ADULT - PROBLEM SELECTOR PLAN 1
Pt. 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 No DSA.     s/p pulse dose steroids. Thymo was not given to treat the rejection as he is too frail and at increased risk for infections. Now with failed allograft function, Remains anuric and is dialysis dependent. Pt underwent IR guided HD catheter exchange on 8/8/22. Last HD done on 9/5/22. Tolerated well. Pt. underwent L IJ tunneled HD catheter placement on 8/30/22.      Labs reviewed. Pt. is still anuric. Plan for HD tomorrow. Monitor for labs. Dose meds as per HD.

## 2022-09-08 NOTE — PROGRESS NOTE ADULT - ASSESSMENT
66 y/  M with Type 2 DM> unknown control due to skewed A1C 6.6% secondary to chronic anemia and s/p blood tx. On basal/bolus insulin therapy PTA. DM c/b ESRD s/p DDRT on 3/21, CVA, CAD s/p CABG, Afib on apixaban, seizure activity, HTN, HLD, h/o GI bleed in 2/2022 EGD with duodenal ulcer, discharged to [facility] rehab center after prior hospitalization, now re-admitted from [facility] for seizures c/f status epilepticus in setting of UTI. Endocrine consulted for steroid induced hyperglycemia.  Prolonged hospital course w/ ICU stay, previously intubated/extubated, previous seizures. S/p ureteral stent removal 7/12/22. 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 Remains on steroids. BG values above goal over past 24 hours after low yesterday on serum glucose. Tolerating pOs. BG goal (100-200mg/dl). DC planning to rehab.

## 2022-09-08 NOTE — PROGRESS NOTE ADULT - REASON FOR ADMISSION
Status Epilepticus
sp   rt vats drainage hemothorax
Status Epilepticus
preop for Rt vats drainage hemothorax, bronchoscopy, possible decortication
renal transplant rejection
Status Epilepticus

## 2022-09-08 NOTE — PROGRESS NOTE ADULT - SUBJECTIVE AND OBJECTIVE BOX
[facility] [place] [facility]   MD NINA MASON MD, PA KRISTINE SOLTANPOUR, DO INJUNG KO, NP    TEL:  OFFICE: [phone]    From 5pm-7am Answering Service [phone]    -- RENAL FOLLOW UP NOTE ---Date of Service 09-08-22 @ 11:48    Patient is a 66y old  Male who presents with a chief complaint of Status Epilepticus (08 Sep 2022 11:42)      Patient seen and examined at bedside. No chest pain/sob    VITALS:  T(F): 97.9 (09-08-22 @ 09:08), Max: 98.5 (09-08-22 @ 01:00)  HR: 65 (09-08-22 @ 09:08)  BP: 169/73 (09-08-22 @ 09:08)  RR: 20 (09-08-22 @ 09:08)  SpO2: 95% (09-08-22 @ 09:08)  Wt(kg): --    09-07 @ 07:01  -  09-08 @ 07:00  --------------------------------------------------------  IN: 1240 mL / OUT: 2850 mL / NET: -1610 mL          PHYSICAL EXAM:  Constitutional: NAD  Neck: No JVD  Respiratory: CTAB, no wheezes, rales or rhonchi  Cardiovascular: S1, S2, RRR  Gastrointestinal: BS+, soft, NT/ND  Extremities: No peripheral edema    Hospital Medications:   MEDICATIONS  (STANDING):  amLODIPine   Tablet 10 milliGRAM(s) Oral daily  artificial  tears Solution 2 Drop(s) Left EYE every 6 hours  brivaracetam 50 milliGRAM(s) Oral two times a day  carvedilol 12.5 milliGRAM(s) Oral every 12 hours  chlorhexidine 2% Cloths 1 Application(s) Topical <User Schedule>  chlorhexidine 4% Liquid 1 Application(s) Topical <User Schedule>  doxazosin 4 milliGRAM(s) Oral <User Schedule>  epoetin cortney-epbx (RETACRIT) Injectable 36444 Unit(s) SubCutaneous <User Schedule>  finasteride 5 milliGRAM(s) Oral daily  furosemide    Tablet 80 milliGRAM(s) Oral <User Schedule>  heparin   Injectable 5000 Unit(s) SubCutaneous every 12 hours  insulin glargine Injectable (LANTUS) 3 Unit(s) SubCutaneous at bedtime  insulin lispro (ADMELOG) corrective regimen sliding scale   SubCutaneous three times a day before meals  insulin lispro (ADMELOG) corrective regimen sliding scale   SubCutaneous at bedtime  insulin lispro Injectable (ADMELOG) 8 Unit(s) SubCutaneous before lunch  insulin lispro Injectable (ADMELOG) 8 Unit(s) SubCutaneous before dinner  insulin lispro Injectable (ADMELOG) 10 Unit(s) SubCutaneous before breakfast  levothyroxine 50 MICROGram(s) Oral daily  melatonin 6 milliGRAM(s) Oral at bedtime  multivitamin 1 Tablet(s) Oral daily  mycophenolate mofetil 500 milliGRAM(s) Oral <User Schedule>  nystatin    Suspension 199060 Unit(s) Oral four times a day  pantoprazole    Tablet 40 milliGRAM(s) Oral <User Schedule>  phenytoin   Capsule 200 milliGRAM(s) Oral two times a day  polyethylene glycol 3350 17 Gram(s) Oral daily  predniSONE   Tablet 10 milliGRAM(s) Oral daily  senna 2 Tablet(s) Oral at bedtime  sirolimus 7 milliGRAM(s) Oral <User Schedule>  trimethoprim   80 mG/sulfamethoxazole 400 mG 1 Tablet(s) Oral daily  valGANciclovir 450 milliGRAM(s) Oral <User Schedule>      LABS:  09-08    131<L>  |  91<L>  |  56<H>  ----------------------------<  260<H>  4.0   |  22  |  3.56<H>    Ca    8.5      08 Sep 2022 06:02  Phos  4.8     09-08  Mg     2.0     09-08    TPro  6.5  /  Alb  2.7<L>  /  TBili  0.3  /  DBili  0.2  /  AST  22  /  ALT  28  /  AlkPhos  577<H>  09-08    Creatinine Trend: 3.56 <--, 5.03 <--, 4.13 <--, 5.62 <--, 4.70 <--, 3.51 <--, 4.78 <--    Phosphorus Level, Serum: 4.8 mg/dL (09-08 @ 06:02)  Albumin, Serum: 2.7 g/dL (09-08 @ 06:02)                              8.1    6.34  )-----------( 405      ( 08 Sep 2022 06:02 )             25.9     Urine Studies:  Urinalysis - [08-16-22 @ 18:49]      Color Yellow / Appearance Slightly Turbid / SG 1.018 / pH 8.5      Gluc 200 mg/dL / Ketone Negative  / Bili Negative / Urobili Negative       Blood Large / Protein >600 / Leuk Est Moderate / Nitrite Positive      RBC 13 / WBC 40 / Hyaline 7 / Gran  / Sq Epi  / Non Sq Epi 4 / Bacteria Moderate      Iron 17, TIBC 171, %sat 10      [05-02-22 @ 13:03]  Ferritin 6336      [07-27-22 @ 13:00]  PTH -- (Ca 9.2)      [02-05-22 @ 10:13]   294  HbA1c 6.0      [02-21-20 @ 08:53]  TSH 4.69      [07-06-22 @ 18:36]  Lipid: chol --, , HDL --, LDL --      [07-27-22 @ 13:00]    HBsAg Nonreact      [09-07-22 @ 06:32]  HCV 0.19, Nonreact      [09-07-22 @ 06:32]      RADIOLOGY & ADDITIONAL STUDIES:

## 2022-09-08 NOTE — PROGRESS NOTE ADULT - NS ATTEND AMEND GEN_ALL_CORE FT
making slightly more urine. cont lasix on non-HD days  cont coumadin for IJ thrombus. decrease to 7mg qhs  Immunosuppression management - Cont sirolimus 7mg daily, prednisone 10 mg daily; cellcept 500mg bid  f/u  for placement in rehab facility  cont seizure prophylaxis

## 2022-09-08 NOTE — H&P ADULT - NSHPSOCIALHISTORY_GEN_ALL_CORE
SOCIAL HISTORY  Smoking - Denied  EtOH - Denied   Drugs - Denied    FUNCTIONAL HISTORY  Lives in  with spouse and sons, 4 JARRETT with B/L HR. All needs met on 1 floor  Prior Level of Function: needed some assist with ADLs and ambulating with RW    CURRENT FUNCTIONAL STATUS  - Bed Mobility: max A  - Transfers: mod A  - Gait: 15' RW mod - max A  - ADLs: UBD mod A, LBD max A, toileting max A, grooming max A

## 2022-09-08 NOTE — PROGRESS NOTE ADULT - NUTRITIONAL ASSESSMENT
Diet, Consistent Carbohydrate w/Evening Snack:   1000mL Fluid Restriction ([id number])  No Concentrated Phosphorus  Supplement Feeding Modality:  Oral  Nepro Cans or Servings Per Day:  3       Frequency:  Daily (08-29-22 @ 16:09) [Active]

## 2022-09-08 NOTE — H&P ADULT - ASSESSMENT
ASSESSMENT/PLAN  66 yr old Male with PMHx of DM type II, HTN, CAD s/p stents/ CABG (2020), AFib on Eliquis, CVA (2019) d/t Afib, Seizure d/o following CVA, last episode was on 4/8/22, h/o GIB (2/2022) EGD with duodenal ulcer.  ESRD due to DM was on HD since 2019, s/p premacath removal 5/10/22 s/p Right DDRT (4/21/22) and placed on belatacept.  Frequent hospitalization d/t weakness, refractory sz, and sepsis.  Admitted and intubated 6/10, found to large pleural effusion s/p thoracentesis ~1.3 and VATS of thoracic 2.2 L.      COMORBIDITES/ACTIVE MEDICAL ISSUES     Gait Instability, ADL impairments and Functional impairments secondary to recurrent UTI, status epilepticus, with debility seizures. 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  Belatacept d/t 8/1 and was started on Sirolimus 7mg QD  - Prednisone 10  - Cellcept 250 BID on hold  - S/p perma cath placement 8/30/2022  - PPx medications: Nystatin, Bactrim, Valcyte (MWF)    #Anemia of chronic disease  - Has gotten total of 6U PRBC and 2 U FFP during acute hospital   - monitor H/H    #HTN  #Afib  - Eliquis switched to Coumadin  - INR 3.02 (9/8) + Coumadin dose- 7mg for 9/8  - Coreg     #Diabetes  - A1C  - Lantus  - Admelog  - FS + ISS    #Hypothyroidism  - Synthroid 37.5 mcg    #Pain control  - Tylenol PRN    #GI/Bowel Mgmt   - At risk for constipation due to neurologic diagnosis, immobility and/or medication use  - Senna,  Miralax PRN    #Bladder management  - PVR q 8 hours (SC if > 400)  - Encourage timed voids Q4hrs while awake for independence and to promote continence during therapy    #DVT ppx + R IJ thrombosis  - Coumadin  - SCDs, TEDs     #Skin:  -No active issues at this time  -At risk for pressure injury due to neurologic diagnosis and relative immobility  -Bilateral heels - soft heel protectors, apply liquid barrier film daily and monitor for tissue type changes  - Turn Q2 hr while in bed, air mattress  - Skin barrier cream as needed  - Nursing to monitor skin Qshift    Diet  - Regular + Thins  [CCHO, DASH/TLC]    + Fluid Restriction: 1L    Precautions / PROPHYLAXIS:   - Falls, Cardiac, Sternal, Spinal, Seizure   - ortho: Weight bearing status: WBAT   - Lungs: Aspiration, Incentive Spirometer   - Pressure injury/Skin: Turn Q2hrs while in bed, OOB to Chair, PT/OT      Follow ups:      MEDICAL PROGNOSIS: GOOD            REHAB POTENTIAL: GOOD             ESTIMATED DISPOSITION: HOME WITH HOME CARE            ELOS: 10-14 Days   EXPECTED THERAPY:     P.T. 1hr/day       O.T. 1hr/day      S.L.P. 1hr/day     P&O Unnecessary     EXP FREQUENCY: 5 days per 7 day period     PRESCREEN COMPARISION:   I have reviewed the prescreen information and I have found no relevant changes between the preadmission screening and my post admission evaluation     RATIONALE FOR INPATIENT ADMISSION - Patient demonstrates the following: (check all that apply)  [X] Medically appropriate for rehabilitation admission  [X] Has attainable rehab goals with an appropriate initial discharge plan  [X] Has rehabilitation potential (expected to make a significant improvement within a reasonable period of time)   [X] Requires close medical management by a rehab physician, rehab nursing care, Hospitalist and comprehensive interdisciplinary team (including PT, OT, & or SLP, Prosthetics and Orthotics)   ASSESSMENT/PLAN  66 yr old Male with PMHx of DM type II, HTN, CAD s/p stents/ CABG (2020), AFib on Eliquis, CVA (2019) d/t Afib, Seizure d/o following CVA, last episode was on 4/8/22, h/o GIB (2/2022) EGD with duodenal ulcer.  ESRD due to DM was on HD since 2019, s/p premacath removal 5/10/22 s/p Right DDRT (4/21/22) and placed on belatacept.  Frequent hospitalization d/t weakness, refractory sz, and sepsis.  Admitted and intubated 6/10, found to large pleural effusion s/p thoracentesis ~1.3 and VATS of thoracic 2.2 L.      COMORBIDITES/ACTIVE MEDICAL ISSUES     Gait Instability, ADL impairments and Functional impairments secondary to recurrent UTI, status epilepticus, with debility seizures. 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  Belatacept d/t 8/1 and was started on Sirolimus 7mg QD  - Prednisone 10  - Cellcept 250 BID on hold  - S/p perma cath placement 8/30/2022  - PPx medications: Nystatin, Bactrim, Valcyte ([facility])  - Nephrology consulted    #Anemia of chronic disease  - Has gotten total of 6U PRBC and 2 U FFP during acute hospital   - monitor H/H    #HTN  #Afib  - Eliquis switched to Coumadin  - INR 3.02 (9/8) + Coumadin dose- 7mg for 9/8  - Coreg     #Diabetes  - A1C  - Lantus  - Admelog  - FS + ISS    #Hypothyroidism  - Synthroid 37.5 mcg    #Pain control  - Tylenol PRN    #GI/Bowel Mgmt   - At risk for constipation due to neurologic diagnosis, immobility and/or medication use  - Senna,  Miralax PRN    #Bladder management  - PVR q 8 hours (SC if > 400)  - Encourage timed voids Q4hrs while awake for independence and to promote continence during therapy    #DVT   - acute DVT to R IJ thrombosis, Superficial vein thrombus to right basilic and cephalic vein  - Coumadin  - SCDs, TEDs     #Skin:  -Stage 2 pressure ulcer to sacrum 0.3 x 0.2- cleanse with NS, apply wet gauze andcover with allevyn foam dressing  -At risk for pressure injury due to neurologic diagnosis and relative immobility  -Bilateral heels - soft heel protectors, apply liquid barrier film daily and monitor for tissue type changes  - Turn Q2 hr while in bed, air mattress  - Skin barrier cream as needed  - Nursing to monitor skin Qshift    Diet  - Regular + Thins  [CCHO, DASH/TLC]    + Fluid Restriction: 1L    Precautions / PROPHYLAXIS:   - Falls, Cardiac, Sternal, Spinal, Seizure   - ortho: Weight bearing status: WBAT   - Lungs: Aspiration, Incentive Spirometer   - Pressure injury/Skin: Turn Q2hrs while in bed, OOB to Chair, PT/OT      Follow ups:    Andre Patterson)  Clinical Neurophysiology; EEGEpilepsy; Neurology  [address] [facility], [address]  [place] [address]  Phone: [phone]  Fax: [phone]  Follow Up Time:     Padmini Lincoln ()  Internal Medicine  [address] [facility] [facility] [address] [facility], [address]  [place] [address]  Phone: [phone]  Fax: [phone]  Follow Up Time:     Iker Alfred)  Internal Medicine; Nephrology  [address] [place] [address]  Phone: [phone]  Fax: [phone]  Follow Up Time:    Iker Alfred  Super[facility]ortulio Physician [facility]  NEPHRO [address] [place] [address] D  Scheduled Appointment: 09/20/2022    Iker Alfred  Super[facility]  NEPH[address]  Scheduled Appointment: 10/20/2022    MEDICAL PROGNOSIS: GOOD            REHAB POTENTIAL: GOOD             ESTIMATED DISPOSITION: HOME WITH HOME CARE            ELOS: 10-14 Days   EXPECTED THERAPY:     P.T. 1hr/day       O.T. 1hr/day      S.L.P. 1hr/day     P&O Unnecessary     EXP FREQUENCY: 5 days per 7 day period     PRESCREEN COMPARISION:   I have reviewed the prescreen information and I have found no relevant changes between the preadmission screening and my post admission evaluation     RATIONALE FOR INPATIENT ADMISSION - Patient demonstrates the following: (check all that apply)  [X] Medically appropriate for rehabilitation admission  [X] Has attainable rehab goals with an appropriate initial discharge plan  [X] Has rehabilitation potential (expected to make a significant improvement within a reasonable period of time)   [X] Requires close medical management by a rehab physician, rehab nursing care, Hospitalist and comprehensive interdisciplinary team (including PT, OT, & or SLP, Prosthetics and Orthotics)   ASSESSMENT/PLAN  66 yr old Male with PMHx of DM type II, HTN, CAD s/p stents/ CABG (2020), AFib on Eliquis, CVA (2019) d/t Afib, Seizure d/o following CVA, last episode was on 4/8/22, h/o GIB (2/2022) EGD with duodenal ulcer.  ESRD due to DM was on HD since 2019, s/p premacath removal 5/10/22 s/p Right DDRT (4/21/22) and placed on belatacept.  Frequent hospitalization d/t weakness, refractory sz, and sepsis.  Admitted and intubated 6/10, found to large pleural effusion s/p thoracentesis ~1.3 and VATS of thoracic 2.2 L.      * CXR f/u recent effusion,  F/u ID recs RENAL TRANSPLANT PATIENT--Clear all meds with transplant team before ordereing    COMORBIDITES/ACTIVE MEDICAL ISSUES     Gait Instability, ADL impairments and Functional impairments secondary to recurrent UTI, status epilepticus, with debility seizures. 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  Belatacept d/t 8/1 and was started on Sirolimus 7mg QD  - Prednisone 10  - Cellcept 250 BID on hold  - S/p perma cath placement 8/30/2022  - PPx medications: Nystatin, Bactrim, Valcyte (MWF)  - Nephrology consulted    #Anemia of chronic disease  - Has gotten total of 6U PRBC and 2 U FFP during acute hospital   - monitor H/H    #HTN  #Afib  - Eliquis switched to Coumadin  - INR 3.02 (9/8) + Coumadin dose- 7mg for 9/8  - Coreg     #Diabetes  - A1C  - Lantus  - Admelog  - FS + ISS    #Hypothyroidism  - Synthroid 37.5 mcg    #Pain control  - Tylenol PRN    #GI/Bowel Mgmt   - At risk for constipation due to neurologic diagnosis, immobility and/or medication use  - Senna,  Miralax PRN    #Bladder management  - PVR q 8 hours (SC if > 400)  - Encourage timed voids Q4hrs while awake for independence and to promote continence during therapy    #DVT   - acute DVT to R IJ thrombosis, Superficial vein thrombus to right basilic and cephalic vein  - Coumadin  - SCDs, TEDs     #Skin:  -Stage 2 pressure ulcer to sacrum 0.3 x 0.2- cleanse with NS, apply wet gauze andcover with allevyn foam dressing  -At risk for pressure injury due to neurologic diagnosis and relative immobility  -Bilateral heels - soft heel protectors, apply liquid barrier film daily and monitor for tissue type changes  - Turn Q2 hr while in bed, air mattress  - Skin barrier cream as needed  - Nursing to monitor skin Qshift    Diet  - Regular + Thins  [CCHO, DASH/TLC]    + Fluid Restriction: 1L    Precautions / PROPHYLAXIS:   - Falls, Cardiac, Sternal, Spinal, Seizure   - ortho: Weight bearing status: WBAT   - Lungs: Aspiration, Incentive Spirometer   - Pressure injury/Skin: Turn Q2hrs while in bed, OOB to Chair, PT/OT      Follow ups:    Andre Patterson)  Clinical Neurophysiology; EEGEpilepsy; Neurology  [address] [facility], [address]  [place] [address]  Phone: [phone]  Fax: [phone]  Follow Up Time:     Padmini Lincoln ()  Internal Medicine  [address] [facility] [facility] [address], [address]  [place] [address]  Phone: [phone]  Fax: [phone]  Follow Up Time:     Iker Alfred)  Internal Medicine; Nephrology  [address] [place] [address]  Phone: [phone]  Fax: [phone]  Follow Up Time:    Iker Alfred  [facility]  NEPH[address] [facility] D  Scheduled Appointment: 09/20/2022    Iker Alfred  [facility]  NEPH[address] [facility] D  Scheduled Appointment: 10/20/2022    MEDICAL PROGNOSIS: GOOD            REHAB POTENTIAL: GOOD             ESTIMATED DISPOSITION: HOME WITH HOME CARE            ELOS: 10-14 Days   EXPECTED THERAPY:     P.T. 1hr/day       O.T. 1hr/day      S.L.P. 1hr/day     P&O Unnecessary     EXP FREQUENCY: 5 days per 7 day period     PRESCREEN COMPARISION:   I have reviewed the prescreen information and I have found no relevant changes between the preadmission screening and my post admission evaluation     RATIONALE FOR INPATIENT ADMISSION - Patient demonstrates the following: (check all that apply)  [X] Medically appropriate for rehabilitation admission  [X] Has attainable rehab goals with an appropriate initial discharge plan  [X] Has rehabilitation potential (expected to make a significant improvement within a reasonable period of time)   [X] Requires close medical management by a rehab physician, rehab nursing care, Hospitalist and comprehensive interdisciplinary team (including PT, OT, & or SLP, Prosthetics and Orthotics)   ASSESSMENT/PLAN  66 yr old Male with PMHx of DM type II, HTN, CAD s/p stents/ CABG (2020), AFib on Eliquis, CVA (2019) d/t Afib, Seizure d/o following CVA, last episode was on 4/8/22, h/o GIB (2/2022) EGD with duodenal ulcer.  ESRD due to DM was on HD since 2019, s/p premacath removal 5/10/22 s/p Right DDRT (4/21/22) and placed on belatacept.  Frequent hospitalization d/t weakness, refractory sz, and sepsis.  Admitted and intubated 6/10, found to large pleural effusion s/p thoracentesis ~1.3 and VATS of thoracic 2.2 L.      * CXR f/u recent effusion,  F/u ID recs RENAL TRANSPLANT PATIENT--Clear all meds with transplant team before ordering PHONE  --Iker Alfred (MD)        COMORBIDITES/ACTIVE MEDICAL ISSUES   Gait Instability, ADL impairments and Functional impairments secondary to recurrent UTI, status epilepticus, with debility seizures. 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  Belatacept d/t 8/1 and was started on Sirolimus 7mg QD  - Prednisone 10  - Cellcept 250 BID on hold  - S/p perma cath placement 8/30/2022  - PPx medications: Nystatin, Bactrim, Valcyte (MWF)  - Nephrology consulted    #Anemia of chronic disease  - Has gotten total of 6U PRBC and 2 U FFP during acute hospital   - monitor H/H    #HTN  #Afib  - Eliquis switched to Coumadin  - INR 3.02 (9/8) + Coumadin dose- 7mg for 9/8  - Coreg     #Diabetes  - A1C  - Lantus  - Admelog  - FS + ISS    #Hypothyroidism  - Synthroid 37.5 mcg    #Pain control  - Tylenol PRN    #GI/Bowel Mgmt   - At risk for constipation due to neurologic diagnosis, immobility and/or medication use  - Senna,  Miralax PRN    #Bladder management  - PVR q 8 hours (SC if > 400)  - Encourage timed voids Q4hrs while awake for independence and to promote continence during therapy    #DVT   - acute DVT to R IJ thrombosis, Superficial vein thrombus to right basilic and cephalic vein  - Coumadin  - SCDs, TEDs     #Skin:  -Stage 2 pressure ulcer to sacrum 0.3 x 0.2- cleanse with NS, apply wet gauze andcover with allevyn foam dressing  -At risk for pressure injury due to neurologic diagnosis and relative immobility  -Bilateral heels - soft heel protectors, apply liquid barrier film daily and monitor for tissue type changes  - Turn Q2 hr while in bed, air mattress  - Skin barrier cream as needed  - Nursing to monitor skin Qshift    Diet  - Regular + Thins  [CCHO, DASH/TLC]    + Fluid Restriction: 1L    Precautions / PROPHYLAXIS:   - Falls, Cardiac, Sternal, Spinal, Seizure   - ortho: Weight bearing status: WBAT   - Lungs: Aspiration, Incentive Spirometer   - Pressure injury/Skin: Turn Q2hrs while in bed, OOB to Chair, PT/OT      Follow ups:    Andre Patterson)  Clinical Neurophysiology; EEGEpilepsy; Neurology  [address] [facility], [address]  [place] [address]  Phone: [phone]  Fax: [phone]  Follow Up Time:     Padmini Lincoln ()  Internal Medicine  [address] [facility], [address]  [place] [address]  Phone: [phone]  Fax: [phone]  Follow Up Time:     Iker Alfred)  Internal Medicine; Nephrology  [address] [place] [address]  Phone: [phone]  Fax: [phone]  Follow Up Time:    Iker Alfred  [facility]  NEPH[address] [facility] D  Scheduled Appointment: 09/20/2022    Iker Alfred  [facility]  NEPH[address] [facility]  Scheduled Appointment: 10/20/2022    MEDICAL PROGNOSIS: GOOD            REHAB POTENTIAL: GOOD             ESTIMATED DISPOSITION: HOME WITH HOME CARE            ELOS: 10-14 Days   EXPECTED THERAPY:     P.T. 1hr/day       O.T. 1hr/day      S.L.P. 1hr/day     P&O Unnecessary     EXP FREQUENCY: 5 days per 7 day period     PRESCREEN COMPARISION:   I have reviewed the prescreen information and I have found no relevant changes between the preadmission screening and my post admission evaluation     RATIONALE FOR INPATIENT ADMISSION - Patient demonstrates the following: (check all that apply)  [X] Medically appropriate for rehabilitation admission  [X] Has attainable rehab goals with an appropriate initial discharge plan  [X] Has rehabilitation potential (expected to make a significant improvement within a reasonable period of time)   [X] Requires close medical management by a rehab physician, rehab nursing care, Hospitalist and comprehensive interdisciplinary team (including PT, OT, & or SLP, Prosthetics and Orthotics)

## 2022-09-08 NOTE — PROGRESS NOTE ADULT - NS_MD_PANP_GEN_ALL_CORE

## 2022-09-08 NOTE — PROGRESS NOTE ADULT - ASSESSMENT
66 yr old Male with PMHx ESRD s/p permacath removal 5/10/22 s/p Rt DDRT 4/21/22,  CVA (2019), Afib on apixaban, seizure activity, CAD s/p stents, CABG (2020), HTN, HLD, DM on insulin, gastric/duodenal ulcer, recent hospitalization 4/28/22 -5/10/22 with weakness/anemia found to have perinephric hematoma requiring evacuation and repair of bleeding arterial anastomosis. Curently being treated for UTI with Levaquin Today after developing multiple sz episodes refractory to 5 mg versed IM (EMS) and 2 mg ativan IV in E.D. concerning for status epilepticus requiring intubation for hypoxic respiratory  failure. MICU Consult was called for hypoxic respiratory failure secondary to status epilepticus.  Nephrology consulted for renal failure.     A/P  DCD KTX on 04/21/22  Course complicated by DGF, was on HD until 4/29/22. Readmitted in May for anemia in the setting of large perinephric hematoma s/p evacuation and repair of arterial anastomosis on 5/02/22. Intra operative biopsy showed no rejection.  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   Now with failed allograft function, Remains dialysis dependent.   continue Immunosuppression per transplant team  s/p Perma cath placement 08/30/22    RRT per transplant team   Avoid PICC line , suggests to put Aldridge catheter if needed   transplant team on board   monitor BMP, U/O     Immunosuppression per transplant team  - Belatacept: 6/23-8/1/2022 discontinued  - on Sirolimus since 8/1/2022 based on level,  Prednisone 10 mg QD 8/16  Cellcept 250 BID on hold   - PPX:  Nystatin, bactrim, valcyte (MWF)      HTN   suboptimal   carvedilol added 09/04/22   manage BP by transplant   monitor BP

## 2022-09-08 NOTE — PATIENT PROFILE ADULT - FUNCTIONAL ASSESSMENT - BASIC MOBILITY 6.
2-calculated by average/Not able to assess (calculate score using [facility] averaging method)

## 2022-09-08 NOTE — PROGRESS NOTE ADULT - PROBLEM SELECTOR PROBLEM 3
HTN (hypertension)
Hypothyroidism
Hyperkalemia
Hypothyroidism
DM (diabetes mellitus)
HLD (hyperlipidemia)
Hypothyroidism
HTN (hypertension)
Hyperkalemia
Hypothyroidism
DM (diabetes mellitus)
HTN (hypertension)
Hypothyroidism
HTN (hypertension)
Hyperkalemia
Hypothyroidism
Hypothyroidism
HTN (hypertension)
Hypothyroidism
HTN (hypertension)
Hypothyroidism
HLD (hyperlipidemia)
HTN (hypertension)
Hyperkalemia
Hyperkalemia
Hypothyroidism
DM (diabetes mellitus)
DM (diabetes mellitus)
HLD (hyperlipidemia)
Hyperkalemia
Hypothyroidism
DM (diabetes mellitus)
HTN (hypertension)
Hyperkalemia
Hypothyroidism
Hypothyroidism
DM (diabetes mellitus)
HTN (hypertension)
Hypothyroidism
DM (diabetes mellitus)
Hyperkalemia
Hypothyroidism
Hypothyroidism
DM (diabetes mellitus)
DM (diabetes mellitus)
HTN (hypertension)
HTN (hypertension)
Hypothyroidism
DM (diabetes mellitus)
DM (diabetes mellitus)
Hypothyroidism
Hypothyroidism
DM (diabetes mellitus)

## 2022-09-08 NOTE — PROGRESS NOTE ADULT - SUBJECTIVE AND OBJECTIVE BOX
no new complaints, denies pain    REVIEW OF SYSTEMS  Constitutional - No fever,  No fatigue  HEENT - No vertigo, No neck pain  Neurological - No headaches, No memory loss, No loss of strength, No numbness, No tremors  Skin - No rashes, No lesions   Musculoskeletal - No joint pain, No joint swelling, No muscle pain  Psychiatric - No depression, No anxiety    FUNCTIONAL PROGRESS  9/6 PT  transfers min assist x 2, RW  gait min assist x 2, rw x 25 feet     VITALS  T(C): 36.6 (09-08-22 @ 09:08), Max: 36.9 (09-08-22 @ 01:00)  HR: 65 (09-08-22 @ 09:08) (59 - 77)  BP: 169/73 (09-08-22 @ 09:08) (136/65 - 185/65)  RR: 20 (09-08-22 @ 09:08) (18 - 20)  SpO2: 95% (09-08-22 @ 09:08) (94% - 99%)  Wt(kg): --    MEDICATIONS   acetaminophen     Tablet .. 650 milliGRAM(s) every 6 hours PRN  amLODIPine   Tablet 10 milliGRAM(s) daily  artificial  tears Solution 2 Drop(s) every 6 hours  brivaracetam 50 milliGRAM(s) two times a day  carvedilol 12.5 milliGRAM(s) every 12 hours  chlorhexidine 2% Cloths 1 Application(s) <User Schedule>  chlorhexidine 4% Liquid 1 Application(s) <User Schedule>  diphenhydrAMINE 25 milliGRAM(s) at bedtime PRN  doxazosin 4 milliGRAM(s) <User Schedule>  epoetin cortney-epbx (RETACRIT) Injectable 21304 Unit(s) <User Schedule>  finasteride 5 milliGRAM(s) daily  furosemide    Tablet 80 milliGRAM(s) <User Schedule>  heparin   Injectable 5000 Unit(s) every 12 hours  insulin glargine Injectable (LANTUS) 3 Unit(s) at bedtime  insulin lispro (ADMELOG) corrective regimen sliding scale   three times a day before meals  insulin lispro (ADMELOG) corrective regimen sliding scale   at bedtime  insulin lispro Injectable (ADMELOG) 8 Unit(s) before lunch  insulin lispro Injectable (ADMELOG) 8 Unit(s) before dinner  insulin lispro Injectable (ADMELOG) 10 Unit(s) before breakfast  levothyroxine 50 MICROGram(s) daily  melatonin 6 milliGRAM(s) at bedtime  multivitamin 1 Tablet(s) daily  mycophenolate mofetil 500 milliGRAM(s) <User Schedule>  nystatin    Suspension 305329 Unit(s) four times a day  ondansetron Injectable 4 milliGRAM(s) once PRN  pantoprazole    Tablet 40 milliGRAM(s) <User Schedule>  phenytoin   Capsule 200 milliGRAM(s) two times a day  polyethylene glycol 3350 17 Gram(s) daily  predniSONE   Tablet 10 milliGRAM(s) daily  senna 2 Tablet(s) at bedtime  sirolimus 7 milliGRAM(s) <User Schedule>  sodium chloride 0.9% lock flush 10 milliLiter(s) every 1 hour PRN  trimethoprim   80 mG/sulfamethoxazole 400 mG 1 Tablet(s) daily  valGANciclovir 450 milliGRAM(s) <User Schedule>      RECENT LABS - Reviewed                        8.1    6.34  )-----------( 405      ( 08 Sep 2022 06:02 )             25.9     09-08    131<L>  |  91<L>  |  56<H>  ----------------------------<  260<H>  4.0   |  22  |  3.56<H>    Ca    8.5      08 Sep 2022 06:02  Phos  4.8     09-08  Mg     2.0     09-08    TPro  6.5  /  Alb  2.7<L>  /  TBili  0.3  /  DBili  0.2  /  AST  22  /  ALT  28  /  AlkPhos  577<H>  09-08    PT/INR - ( 08 Sep 2022 06:02 )   PT: 35.4 sec;   INR: 3.02 ratio         PTT - ( 08 Sep 2022 06:02 )  PTT:40.8 sec      ----------------------------------------------------------------------------  PHYSICAL EXAM  Constitutional - NAD, Comfortable, in chair   Chest - Breathing comfortably  Cardiovascular - S1S2   Abdomen - Soft   Extremities - No C/C/E, No calf tenderness   Neurologic Exam -  follows commands                   Cognitive - Awake, Alert, AAO to self, place, month     Communication - Fluent, No dysarthria        Motor - 4/5     Sensory - Intact to LT    Psychiatric - Mood stable, Affect WNL  ----------------------------------------------------------------------------------------  ASSESSMENT/PLAN  66yMale h/o DM type II, HTN, CAD s/p CABG in 2020, Afib on Eliquis, CVA in 2019 due to Afib, Seizure d/o (last episode was on 4/8/22), h/o GI bleed in 2/2022 EGD with duodenal ulcer and ESRD on HD s/p R DDRT with functional deficits after UTI, status epilepticus, with debility   seizures, on briviact, fosphenytoin taper as outpatient    transferred to SICU 7/25, biopsy with rejection, received steroids, anuric on HD   cellulitis, retroperitoneal hematoma, on cephalexin    Pain - Tylenol  afib/IJ thrombosis, coumadin   Rehab -   continue bedside therapy  out of bed to chair daily   Recommend ACUTE inpatient rehabilitation for the functional deficits consisting of 3 hours of therapy/day & 24 hour RN/daily PMR physician for comorbid medical management. Will continue to follow for ongoing rehab needs and recommendations. Patient will be able to tolerate 3 hours a day.

## 2022-09-09 NOTE — DIETITIAN INITIAL EVALUATION ADULT - PERTINENT MEDS FT
MEDICATIONS  (STANDING):  amLODIPine   Tablet 10 milliGRAM(s) Oral daily  artificial  tears Solution 2 Drop(s) Both EYES every 6 hours  brivaracetam 50 milliGRAM(s) Oral two times a day  carvedilol 12.5 milliGRAM(s) Oral every 12 hours  chlorhexidine 2% Cloths 1 Application(s) Topical <User Schedule>  dextrose 5%. 1000 milliLiter(s) (100 mL/Hr) IV Continuous <Continuous>  dextrose 5%. 1000 milliLiter(s) (50 mL/Hr) IV Continuous <Continuous>  dextrose 50% Injectable 25 Gram(s) IV Push once  dextrose 50% Injectable 12.5 Gram(s) IV Push once  dextrose 50% Injectable 25 Gram(s) IV Push once  doxazosin 4 milliGRAM(s) Oral <User Schedule>  epoetin cortney-epbx (RETACRIT) Injectable 60205 Unit(s) SubCutaneous <User Schedule>  finasteride 5 milliGRAM(s) Oral daily  furosemide    Tablet 80 milliGRAM(s) Oral daily  glucagon  Injectable 1 milliGRAM(s) IntraMuscular once  insulin glargine Injectable (LANTUS) 3 Unit(s) SubCutaneous at bedtime  insulin lispro (ADMELOG) corrective regimen sliding scale   SubCutaneous three times a day before meals  insulin lispro (ADMELOG) corrective regimen sliding scale   SubCutaneous at bedtime  insulin lispro Injectable (ADMELOG) 10 Unit(s) SubCutaneous before breakfast  insulin lispro Injectable (ADMELOG) 8 Unit(s) SubCutaneous before lunch  insulin lispro Injectable (ADMELOG) 9 Unit(s) SubCutaneous before dinner  levothyroxine 50 MICROGram(s) Oral daily  melatonin 6 milliGRAM(s) Oral at bedtime  multivitamin 1 Tablet(s) Oral daily  mycophenolate mofetil 500 milliGRAM(s) Oral <User Schedule>  nystatin    Suspension 172667 Unit(s) Oral four times a day  pantoprazole    Tablet 40 milliGRAM(s) Oral <User Schedule>  phenytoin   Capsule 200 milliGRAM(s) Oral two times a day  polyethylene glycol 3350 17 Gram(s) Oral daily  predniSONE   Tablet 10 milliGRAM(s) Oral daily  senna 2 Tablet(s) Oral at bedtime  sirolimus 7 milliGRAM(s) Oral <User Schedule>  trimethoprim   80 mG/sulfamethoxazole 400 mG 1 Tablet(s) Oral daily  valGANciclovir 450 milliGRAM(s) Oral <User Schedule>    MEDICATIONS  (PRN):  acetaminophen     Tablet .. 650 milliGRAM(s) Oral every 6 hours PRN Temp greater or equal to 38C (100.4F), Mild Pain (1 - 3)  dextrose Oral Gel 15 Gram(s) Oral once PRN Blood Glucose LESS THAN 70 milliGRAM(s)/deciliter

## 2022-09-09 NOTE — DIETITIAN INITIAL EVALUATION ADULT - RD TO REMAIN AVAILABLE
Patient Returning Call  Reason for call:  Physical  Information relayed to patient:  Mother thinks the child only had a sports physical not an annual visit.  She thinks that if full physical was done the  patient would of had a fasting blood draw eg: cholesterol level drawn.  Please advise.    Patient has additional questions:  No  If YES, what are your questions/concerns:  NA  Okay to leave a detailed message?: Yes   yes

## 2022-09-09 NOTE — DIETITIAN INITIAL EVALUATION ADULT - EDUCATION DIETARY MODIFICATIONS
Education Provided on Need for Supplementation & Consistent Carbohydrate, Low Phosphorus Diet/(2) meets goals/outcomes/verbalization

## 2022-09-09 NOTE — CONSULT NOTE ADULT - SUBJECTIVE AND OBJECTIVE BOX
HPI:   Patient is a 66y male with a past history of DM, CAD, CABG as well as stents, HFrEF,ICD, ESRD, s/p DDRT in Spring of 2022 which has failed and he now requires maintenance HD via left chest HDC who was transferred to rehab yesterday.  He has been hospitalized for the majority of his time post transplant in April.  He had required reop for arterial bleeding in May of renal anastomosis along with drainage of a perirenal hematoma.  He required thoracentesis on RT, had a Rt VATS for a PTX, fluid not infected.  He has received a variety of antibiotics along the way including vanco, cefepime, meropenem, and cephalexin, no clear invasive or bloodstream infection.  He has a seizure disorder and is colonized with ESBL organisms.  He is very weak, almost a functional quadriplegia, is able to void but limited.No recent fever and his present antibiotics are for prophylaxis, valganciclovir and bactrim.He is on prednisone 10 mg, sirolimus and mycophenolate for rejection.    REVIEW OF SYSTEMS:  All other review of systems negative (Comprehensive ROS): very weak    PAST MEDICAL & SURGICAL HISTORY:  Hypertension      Diabetes      Dyslipidemia      CAD (Coronary Artery Disease)  with Stents in 2009 and 2019, s/p off-pump C3L on 20      Hypothyroidism      CVA (cerebral vascular accident)  19 with residual bilateral weakness      Anemia      PEG (percutaneous endoscopic gastrostomy) status  removed 2020      Intubation of airway performed without difficulty  Dec 2019  CVA c/b status epilepticus requiring intubation and PEG in 2019-      ESRD on dialysis  M/W/F      Seizures  after CVA, last seizure was last week 22      History of insertion of stent into coronary artery bypass graft   and       S/P CABG x 3  off pump C3L on 20          Allergies    No Known Allergies    Intolerances        Antimicrobials Day #  :  nystatin    Suspension 877338 Unit(s) Oral four times a day  trimethoprim   80 mG/sulfamethoxazole 400 mG 1 Tablet(s) Oral daily  valGANciclovir 450 milliGRAM(s) Oral <User Schedule>    Other Medications:  acetaminophen     Tablet .. 650 milliGRAM(s) Oral every 6 hours PRN  amLODIPine   Tablet 10 milliGRAM(s) Oral daily  artificial  tears Solution 2 Drop(s) Both EYES every 6 hours  brivaracetam 50 milliGRAM(s) Oral two times a day  carvedilol 12.5 milliGRAM(s) Oral every 12 hours  chlorhexidine 2% Cloths 1 Application(s) Topical <User Schedule>  dextrose 5%. 1000 milliLiter(s) IV Continuous <Continuous>  dextrose 5%. 1000 milliLiter(s) IV Continuous <Continuous>  dextrose 50% Injectable 25 Gram(s) IV Push once  dextrose 50% Injectable 12.5 Gram(s) IV Push once  dextrose 50% Injectable 25 Gram(s) IV Push once  dextrose Oral Gel 15 Gram(s) Oral once PRN  doxazosin 4 milliGRAM(s) Oral <User Schedule>  epoetin cortney-epbx (RETACRIT) Injectable 65184 Unit(s) SubCutaneous <User Schedule>  finasteride 5 milliGRAM(s) Oral daily  furosemide    Tablet 80 milliGRAM(s) Oral daily  glucagon  Injectable 1 milliGRAM(s) IntraMuscular once  insulin glargine Injectable (LANTUS) 3 Unit(s) SubCutaneous at bedtime  insulin lispro (ADMELOG) corrective regimen sliding scale   SubCutaneous three times a day before meals  insulin lispro (ADMELOG) corrective regimen sliding scale   SubCutaneous at bedtime  insulin lispro Injectable (ADMELOG) 10 Unit(s) SubCutaneous before breakfast  insulin lispro Injectable (ADMELOG) 8 Unit(s) SubCutaneous before lunch  insulin lispro Injectable (ADMELOG) 9 Unit(s) SubCutaneous before dinner  levothyroxine 50 MICROGram(s) Oral daily  melatonin 6 milliGRAM(s) Oral at bedtime  multivitamin 1 Tablet(s) Oral daily  mycophenolate mofetil 500 milliGRAM(s) Oral <User Schedule>  pantoprazole    Tablet 40 milliGRAM(s) Oral <User Schedule>  phenytoin   Capsule 200 milliGRAM(s) Oral two times a day  polyethylene glycol 3350 17 Gram(s) Oral daily  predniSONE   Tablet 10 milliGRAM(s) Oral daily  senna 2 Tablet(s) Oral at bedtime  sirolimus 7 milliGRAM(s) Oral <User Schedule>  warfarin 7 milliGRAM(s) Oral once      FAMILY HISTORY:  Family history of cancer of tongue (Father)  Parents are  . Father - at 80 years, tongue cancer was a smoker. Mother- at 71, had accident while crossing road. Siblings- 2 brothers and one sister.        SOCIAL HISTORY:  Smoking:  x   ETOH:  x   Drug Use: x      T(F): 97.5 (22 @ 08:10), Max: 98 (22 @ 17:31)  HR: 60 (22 @ 08:10)  BP: 155/73 (22 @ 08:10)  RR: 16 (22 @ 08:10)  SpO2: 94% (22 @ 08:10)  Wt(kg): --    PHYSICAL EXAM:  General: alert, no acute distress, frail appearing  Eyes:  anicteric, no conjunctival injection, no discharge  Oropharynx: no lesions or injection 	  Neck: supple, without adenopathy. HDC in  left chest  Lungs: clear to auscultation, decreased at bases  Heart: regular rate and rhythm; no murmur, rubs or gallops  Abdomen: soft, nondistended, nontender, without mass or organomegaly  Skin: no rash, RLQ incision well healed  Extremities: no clubbing, cyanosis, or edema  Neurologic: alert, oriented, functional quadriplegia    LAB RESULTS:                        8.4    6.41  )-----------( 423      ( 09 Sep 2022 06:30 )             26.3     -    130<L>  |  90<L>  |  84<H>  ----------------------------<  156<H>  4.6   |  23  |  5.12<H>    Ca    8.6      09 Sep 2022 06:30  Phos  4.8     -  Mg     2.0         TPro  6.7  /  Alb  1.9<L>  /  TBili  0.4  /  DBili  x   /  AST  25  /  ALT  30  /  AlkPhos  515<H>      LIVER FUNCTIONS - ( 09 Sep 2022 06:30 )  Alb: 1.9 g/dL / Pro: 6.7 g/dL / ALK PHOS: 515 U/L / ALT: 30 U/L / AST: 25 U/L / GGT: x               MICROBIOLOGY:  RECENT CULTURES:        RADIOLOGY REVIEWED:  < from: US Abdomen Doppler (22 @ 09:57) >  IMPRESSION:    Borderline hepatomegaly. No masses seen.    Patent portal and hepatic veins.    --- End of Report ---    < end of copied text >  < from: Xray Chest 1 View- PORTABLE-Urgent (Xray Chest 1 View- PORTABLE-Urgent .) (22 @ 04:10) >  MPRESSION:  Interval improved pulmonary vascular congestion.    --- End of Report ---    < end of copied text >  < from: CT Abdomen and Pelvis No Cont (22 @ 09:53) >  IMPRESSION:    No drainable fluid collections.    Right psoas retroperitoneal hematoma.    Worsening bilateral pleural effusions.    --- End of Report ---    < end of copied text >

## 2022-09-09 NOTE — CONSULT NOTE ADULT - ASSESSMENT
Patient is a 66y male with a past history of DM, CAD, CABG as well as stents, HFrEF,ICD, ESRD, s/p DDRT in Spring of 2022 which has failed and he now requires maintenance HD via left chest HDC who was transferred to rehab yesterday.  He has been hospitalized for the majority of his time post transplant in April.  He had required reop for arterial bleeding in May of renal anastomosis along with drainage of a perirenal hematoma.  He required thoracentesis on RT, had a Rt VATS for a PTX, fluid not infected.  He has received a variety of antibiotics along the way including vanco, cefepime, meropenem, and cephalexin, no clear invasive or bloodstream infection.  He has a seizure disorder and is colonized with ESBL organisms.  He is very weak, almost a functional quadriplegia, is able to void but limited.No recent fever and his present antibiotics are for prophylaxis, valganciclovir and bactrim.He is on prednisone 10 mg, sirolimus and mycophenolate for rejection.  I do not see any signs of active infection. He has not had any significant fungal, viral, mycobacterial infections over the past 3 months.  He had a skin biopsy of erythematous skin in RLQ that was non diagnostic  with negative micro.No outstanding ID issues at discharge  Suggest:  1.Bactrim and valganciclovir renally dosed for prophylaxis  2. Will defer to transplant team on frequency of CMV serum viral loads they desire  3. Baseline strongyloides serology-did not see in sunrise  4.Additional ID w/u pending course  5.Thanks, will follow from ID viewpoint

## 2022-09-09 NOTE — CONSULT NOTE ADULT - SUBJECTIVE AND OBJECTIVE BOX
66 yr old Male with PMHx of DM type II, HTN, CAD s/p stents/ CABG (), AFib on Eliquis, CVA () d/t Afib, Seizure d/o following CVA, last episode was on 22, h/o GIB (2022) EGD with duodenal ulcer.  ESRD due to DM was on HD since , s/p premacath removal 5/10/22 s/p Right DDRT (22) and placed on belatacept.  Recent hospitalization 22 -5/10/22 with weakness/anemia found to have perinephric hematoma requiring evacuation and repair of bleeding arterial anastomosis. Was discharged to [facility], currently being treated for UTI with Levaquin, developed multiple sz episodes refractory to 5 mg versed IM (EMS) and 2 mg ativan IV in E.D. concerning for status epilepticus requiring intubation for hypoxic respiratory failure. MICU Consult was called for hypoxic respiratory failure secondary to status epilepticus. Was recently dx with klebsiella UTI - started with ertapenem which was then switched to Levaquin. He also had Parainfluenza PNA.      Patient readmitted 6/10 ED VS temp 97.8 Axillary, HR 61 RR26 100% on NRB mask. Per ED noted the patient was noted to be gurgling and was unable to protect airway, and was intubated.  EEG without Sz activity.  patient was extubated and had thoracentesis (1.3L)  for right pleural effusion - post op he needed 5U PRBC and 2 U FFP.  Was reintubated  - CT found hemothorax ~1.6 L.  S/p VATS with thoracic 2.2 L old blood removed and 2nd chest tube placed (removed ).  R IJ DVT () - heparin switched to lovenox.  Noted with refractory Sx and AMS debility and placed on tube feed (7/10).    Hospital course complicated by sepsis - UTI treated with Ertapenem.  Despite abx, he had intermittent fevers.  s/p Urethral stent (). Restarted HD () + 1 U PRBC.  Immunosuppressive drug switched to Sirolimus.  Noted again with refractory seizures on  - EEG with 1 seizure, Brivaracetam started ().  AC switched from eliquis to coumadin (d/t 40% less efficacy while on phenytoin).      seen at the bedside, c/o constant sacral and buttock pain, 7/10, aching, non radiaitng, aggravated by pressure, no n/v, no sob.     Review of Systems: per hpi, all other negative      Allergies and Intolerances:        Allergies:  	No Known Allergies:     Home Medications:   * Patient Currently Takes Medications as of 2022 20:47 documented in Structured Notes  · 	magnesium oxide 400 mg oral tablet: 1 tab(s) orally 3 times a day (with meals)  · 	Coreg 6.25 mg oral tablet: 1 tab(s) orally 2 times a day  · 	fluconazole 200 mg oral tablet: 1 tab(s) orally once a day  · 	levothyroxine 50 mcg (0.05 mg) oral tablet: 1 tab(s) orally once a day  · 	sulfamethoxazole-trimethoprim 400 mg-80 mg oral tablet: 1 tab(s) orally once a day  · 	pantoprazole 40 mg oral delayed release tablet: 1 tab(s) orally once a day (before a meal)  · 	senna oral tablet: 2 tab(s) orally once a day (at bedtime)  · 	valGANciclovir 450 mg oral tablet: 1 tab(s) orally 3 times a week  · 	atorvastatin 40 mg oral tablet: 1 tab(s) orally once a day (at bedtime)  · 	HumaLOG 100 units/mL injectable solution: 5 unit(s) injectable 3 times a day  · 	insulin glargine 100 units/mL subcutaneous solution: 6 unit(s) subcutaneous once a day (at bedtime)  · 	levETIRAcetam 250 mg oral tablet: 1 tab(s) orally 2 times a day  · 	divalproex sodium 250 mg oral delayed release tablet: 1 tab(s) orally once a day  · 	divalproex sodium 500 mg oral delayed release tablet: 1 tab(s) orally 2 times a day  · 	apixaban 2.5 mg oral tablet: 1 tab(s) orally 2 times a day  · 	predniSONE: 5 milligram(s) orally once a day  · 	Multiple Vitamins oral capsule: 1 cap(s) orally once a day  · 	Envarsus XR 1 mg oral tablet, extended release: 3 tab(s) by gastrostomy tube once a day (in the morning)  · 	amLODIPine 10 mg oral tablet: 1 tab(s) enteral once a day  · 	CellCept 500 mg oral tablet: 1 tab(s) orally 2 times a day    .    Patient History:    Past Medical, Past Surgical, and Family History:  PAST MEDICAL HISTORY:  Anemia     CAD (Coronary Artery Disease) with Stents in 2009 and 2019, s/p off-pump C3L on 20    CVA (cerebral vascular accident) 19 with residual bilateral weakness    Diabetes     Dyslipidemia     ESRD on dialysis M/W/F    Hypertension     Hypothyroidism     Intubation of airway performed without difficulty Dec 2019  CVA c/b status epilepticus requiring intubation and PEG in 2019-    PEG (percutaneous endoscopic gastrostomy) status removed 2020    Seizures after CVA, last seizure was last week 22.     PAST SURGICAL HISTORY:  History of insertion of stent into coronary artery bypass graft  and     S/P CABG x 3 off pump C3L on 20.     FAMILY HISTORY:  Father  at age 80, CHF  mom  leonel ge   Still living? No  Family history of cancer of tongue, Age at diagnosis: Age Unknown.     Social History:   no smoking or etoh      Physical Exam:   Physical Exam: PHYSICAL EXAM  VITALS  T(C): 36.7 (22 @ 17:31), Max: 36.9 (22 @ 01:00)  HR: 64 (22 @ 17:31) (62 - 77)  BP: 147/68 (22 @ 17:31) (128/63 - 169/75)  RR: 16 (22 @ 17:31) (16 - 20)  SpO2: 94% (22 @ 17:31) (94% - 99%)    Gen - NAD, Comfortable  HEENT - NCAT, EOMI, MMM  Neck - Supple, No limited ROM  Pulm - CTAB, No wheeze, No rhonchi, No crackles  Cardiovascular - RRR, S1S2, No murmurs  Chest - good chest expansion, good respiratory effort,  left chest wall permcath  Abdomen - Soft, NT/ND, +BS  Extremities - No Cyanosis, no clubbing, trace edema to b/l ankles, no calf tenderness  Neuro-     Cognitive - awake, alert, oriented to person and place.     Communication - Fluent     Attention: Intact, able to state days of week chronologically and backwards. Able to perform simple additions and subtractions     Judgement: Good evidence of judgement     Memory: Recall 3 objects immediate, 1/3 after 3 minutes independently and 2/3 with cue     Cranial Nerves - CN 2-12 intact. No facial asymmetry, Tongue midline, EOMI, Shoulder shrug intact     Motor -                     LEFT    UE - 3+5                    RIGHT UE - 3-/5                    LEFT    LE - 3+/5                    RIGHT LE - 3-/5        Sensory - Intact to LT bilaterally     Reflexes - DTR Intact     Coordination - FTN  impaired to right side     Tone - normal  MSK: generalized weakness  Psychiatric - Mood stable, Affect WNL  Skin:  stage 2 pressure ulcer to sacrum 0.3 x 0.2, dark brown discoloration to RLQ of abdomen       Labs and Results:  Labs, Radiology, Cardiology, and Other Results: 8.1    6.34  )-----------( 405      ( 08 Sep 2022 06:02 )             25.9     09-08    131<L>  |  91<L>  |  56<H>  ----------------------------<  260<H>  4.0   |  22  |  3.56<H>    Ca    8.5      08 Sep 2022 06:02  Phos  4.8     09-08  Mg     2.0     09-08    TPro  6.5  /  Alb  2.7<L>  /  TBili  0.3  /  DBili  0.2  /  AST  22  /  ALT  28  /  AlkPhos  577<H>  09-08    INR: 3.02 ratio       RADIOLOGY, EKG & ADDITIONAL TESTS:   US Abdomen Doppler (22 @ 09:57)   Borderline hepatomegaly    IR () - Tunneled HD catheter (left IJ)< from: [facility] Duplex Upper Ext Vein Scan, Bilat (08.10.22 @ 13:03)     Duplex (08.10/.2022) -   The right internal jugular vein remains thrombosed.  Superficial thrombophlebitis affects the left cephalic vein    Xray Chest 1 View-  (22 @ 23:21)   Moderate to large right pleural effusion with associated atelectasis is decreased in size compared to the prior study.    CT Chest No Cont (22 @ 17:53)   Right-sided chest tube with large hemothorax and atelectasis of the right lower lobe    [facility] Duplex Upper Ext Vein Scan, Right (22 @ 14:19)   Acute DVT is identified in the right internal jugular vein.  Superficial vein thrombus is seen in the right cephalic and basilic veins.    MR Head w/wo IV Cont (22 @ 15:23)   Extensive bilateral frontal parietal gliosis and   encephalomalacia with minimal enhancement which may be related to ischemic changes versus changes of hypertensive encephalopathy in a patient with renal transplant on immunosuppressants. No evidence of cerebritis or abscess.   66 yr old Male with PMHx of DM 2, HTN, CAD s/p stents/ CABG (), AFib on Eliquis, CVA () d/t Afib, Seizure d/o following CVA, last episode was on 22, h/o GIB (2022) EGD with duodenal ulcer.  ESRD due to DM was on HD since , s/p premacath removal 5/10/22 s/p Right DDRT (22) and placed on belatacept.  Recent hospitalization 22 -5/10/22 with weakness/anemia found to have perinephric hematoma requiring evacuation and repair of bleeding arterial anastomosis. Was discharged to [facility], currently being treated for UTI with Levaquin, developed multiple sz episodes refractory to 5 mg versed IM (EMS) and 2 mg ativan IV in E.D. concerning for status epilepticus requiring intubation for hypoxic respiratory failure. MICU Consult was called for hypoxic respiratory failure secondary to status epilepticus. Was recently dx with klebsiella UTI - started with ertapenem which was then switched to Levaquin. He also had Parainfluenza PNA.      Patient readmitted 6/10 ED VS temp 97.8 Axillary, HR 61 RR26 100% on NRB mask. Per ED noted the patient was noted to be gurgling and was unable to protect airway, and was intubated.  EEG without Sz activity.  patient was extubated and had thoracentesis (1.3L)  for right pleural effusion - post op he needed 5U PRBC and 2 U FFP.  Was reintubated  - CT found hemothorax ~1.6 L.  S/p VATS with thoracic 2.2 L old blood removed and 2nd chest tube placed (removed ).  R IJ DVT () - heparin switched to lovenox.  Noted with refractory Sx and AMS debility and placed on tube feed (7/10).    Hospital course complicated by sepsis - UTI treated with Ertapenem.  Despite abx, he had intermittent fevers.  s/p Urethral stent (). Restarted HD () + 1 U PRBC.  Immunosuppressive drug switched to Sirolimus.  Noted again with refractory seizures on  - EEG with 1 seizure, Brivaracetam started ().  AC switched from eliquis to coumadin (d/t 40% less efficacy while on phenytoin).      seen at the bedside, for medical consultation, c/o constant sacral and buttock pain, 7/10, aching, non radiating, aggravated by pressure, no n/v, no sob.     Review of Systems: per hpi, all other negative      Allergies and Intolerances:        Allergies:  	No Known Allergies:     Home Medications:   * Patient Currently Takes Medications as of 2022 20:47 documented in Structured Notes  · 	magnesium oxide 400 mg oral tablet: 1 tab(s) orally 3 times a day (with meals)  · 	Coreg 6.25 mg oral tablet: 1 tab(s) orally 2 times a day  · 	fluconazole 200 mg oral tablet: 1 tab(s) orally once a day  · 	levothyroxine 50 mcg (0.05 mg) oral tablet: 1 tab(s) orally once a day  · 	sulfamethoxazole-trimethoprim 400 mg-80 mg oral tablet: 1 tab(s) orally once a day  · 	pantoprazole 40 mg oral delayed release tablet: 1 tab(s) orally once a day (before a meal)  · 	senna oral tablet: 2 tab(s) orally once a day (at bedtime)  · 	valGANciclovir 450 mg oral tablet: 1 tab(s) orally 3 times a week  · 	atorvastatin 40 mg oral tablet: 1 tab(s) orally once a day (at bedtime)  · 	HumaLOG 100 units/mL injectable solution: 5 unit(s) injectable 3 times a day  · 	insulin glargine 100 units/mL subcutaneous solution: 6 unit(s) subcutaneous once a day (at bedtime)  · 	levETIRAcetam 250 mg oral tablet: 1 tab(s) orally 2 times a day  · 	divalproex sodium 250 mg oral delayed release tablet: 1 tab(s) orally once a day  · 	divalproex sodium 500 mg oral delayed release tablet: 1 tab(s) orally 2 times a day  · 	apixaban 2.5 mg oral tablet: 1 tab(s) orally 2 times a day  · 	predniSONE: 5 milligram(s) orally once a day  · 	Multiple Vitamins oral capsule: 1 cap(s) orally once a day  · 	Envarsus XR 1 mg oral tablet, extended release: 3 tab(s) by gastrostomy tube once a day (in the morning)  · 	amLODIPine 10 mg oral tablet: 1 tab(s) enteral once a day  · 	CellCept 500 mg oral tablet: 1 tab(s) orally 2 times a day    .    Patient History:    Past Medical, Past Surgical, and Family History:  PAST MEDICAL HISTORY:  Anemia     CAD (Coronary Artery Disease) with Stents in 2009 and 2019, s/p off-pump C3L on 20    CVA (cerebral vascular accident) 19 with residual bilateral weakness    Diabetes     Dyslipidemia     ESRD on dialysis M/W/F    Hypertension     Hypothyroidism     Intubation of airway performed without difficulty Dec 2019  CVA c/b status epilepticus requiring intubation and PEG in 2019-    PEG (percutaneous endoscopic gastrostomy) status removed 2020    Seizures after CVA, last seizure was last week 22.     PAST SURGICAL HISTORY:  History of insertion of stent into coronary artery bypass graft  and     S/P CABG x 3 off pump C3L on 20.     FAMILY HISTORY:  Father  at age 80, CHF  mom  leonel ge   Still living? No  Family history of cancer of tongue, Age at diagnosis: Age Unknown.     Social History:   no smoking or etoh      Physical Exam:   Vital Signs Last 24 Hrs  T(C): 36.4 (09 Sep 2022 08:10), Max: 36.7 (08 Sep 2022 17:31)  T(F): 97.5 (09 Sep 2022 08:10), Max: 98 (08 Sep 2022 17:31)  HR: 60 (09 Sep 2022 08:10) (60 - 65)  BP: 155/73 (09 Sep 2022 08:10) (128/63 - 162/70)  BP(mean): --  RR: 16 (09 Sep 2022 08:10) (16 - 20)  SpO2: 94% (09 Sep 2022 08:10) (94% - 97%)    Parameters below as of 09 Sep 2022 08:10  Patient On (Oxygen Delivery Method): room air        GENERAL- NAD  EAR/NOSE/MOUTH/THROAT - no pharyngeal exudates, no oral leisions,  MMM  EYES- PEDRO, conjunctiva and Sclera clear  NECK- supple  RESPIRATORY-  clear to auscultation bilaterally, non laboured breathing  CARDIOVASCULAR - SIS2, RRR  GI - soft NT BS present, abdominal wall edema right flank and RLQ.   EXTREMITIES- no pedal edema  NEUROLOGY- mild LUE weakness compared to RUE  SKIN- sacral DU  stage 2   PSYCHIATRY- AAO X 3  MUSCULOSKELETAL- AROM normal       Labs and Results:                8.4                  130  | 23   | 84           6.41  >-----------< 423     ------------------------< 156                   26.3                 4.6  | 90   | 5.12                                         Ca 8.6   Mg x     Ph x            CAPILLARY BLOOD GLUCOSE  CAPILLARY BLOOD GLUCOSE      POCT Blood Glucose.: 248 mg/dL (09 Sep 2022 09:26)  POCT Blood Glucose.: 183 mg/dL (09 Sep 2022 07:55)  POCT Blood Glucose.: 152 mg/dL (08 Sep 2022 19:02)  POCT Blood Glucose.: 184 mg/dL (08 Sep 2022 13:16)  POCT Blood Glucose.: 201 mg/dL (08 Sep 2022 12:01)          Labs reviewed:     CXR personally reviewed: < from: Xray Chest 1 View- PORTABLE-Urgent (Xray Chest 1 View- PORTABLE-Urgent .) (22 @ 04:10) >  IMPRESSION:  Interval improved pulmonary vascular congestion.    < end of copied text >      ECG reviewed and interpreted: < from: 12 Lead ECG (22 @ 05:20) >    Ventricular Rate 57 BPM    Atrial Rate 57 BPM    P-R Interval 180 ms    QRS Duration 136 ms    Q-T Interval 482 ms    QTC Calculation(Bazett) 469 ms    P Axis 47 degrees    R Axis 55 degrees    T Axis 78 degrees    Diagnosis Line SINUS BRADYCARDIA  LEFT BUNDLE BRANCH BLOCK  ABNORMAL ECG  WHEN COMPARED WITH ECG OF 10-JUL-2022 18:25,  NO SIGNIFICANT CHANGE WAS FOUND    < end of copied text >      < from: US Abdomen Doppler (22 @ 09:57) >  IMPRESSION:    Borderline hepatomegaly. No masses seen.    Patent portal and hepatic veins.    < end of copied text >  A1C with Estimated Average Glucose Result: 6.4: Method: Immunoassay       Reference Range                4.0-5.6%       High risk (prediabetic)        5.7-6.4%       Diabetic, diagnostic             >=6.5%       ADA diabetic treatment goal       <7.0%  The Hemoglobin A1c testing is NGSP-certified.Reference ranges are based  upon the 2010 recommendations of  the American Diabetes Association.  Interpretation may vary for children  and adolescents. % (09.04.22 @ 06:08)    PT/INR - ( 09 Sep 2022 06:30 )   PT: 33.1 sec;   INR: 2.82 ratio         PTT - ( 08 Sep 2022 06:02 )  PTT:40.8 sec    MEDICATIONS  (STANDING):  amLODIPine   Tablet 10 milliGRAM(s) Oral daily  artificial  tears Solution 2 Drop(s) Both EYES every 6 hours  brivaracetam 50 milliGRAM(s) Oral two times a day  carvedilol 12.5 milliGRAM(s) Oral every 12 hours  chlorhexidine 2% Cloths 1 Application(s) Topical <User Schedule>  doxazosin 4 milliGRAM(s) Oral <User Schedule>  epoetin cortney-epbx (RETACRIT) Injectable 76875 Unit(s) SubCutaneous <User Schedule>  finasteride 5 milliGRAM(s) Oral daily  furosemide    Tablet 80 milliGRAM(s) Oral daily  glucagon  Injectable 1 milliGRAM(s) IntraMuscular once  insulin glargine Injectable (LANTUS) 3 Unit(s) SubCutaneous at bedtime  insulin lispro (ADMELOG) corrective regimen sliding scale   SubCutaneous three times a day before meals  insulin lispro (ADMELOG) corrective regimen sliding scale   SubCutaneous at bedtime  insulin lispro Injectable (ADMELOG) 10 Unit(s) SubCutaneous before breakfast  insulin lispro Injectable (ADMELOG) 8 Unit(s) SubCutaneous before lunch  insulin lispro Injectable (ADMELOG) 9 Unit(s) SubCutaneous before dinner  levothyroxine 50 MICROGram(s) Oral daily  melatonin 6 milliGRAM(s) Oral at bedtime  multivitamin 1 Tablet(s) Oral daily  mycophenolate mofetil 500 milliGRAM(s) Oral <User Schedule>  nystatin    Suspension 767439 Unit(s) Oral four times a day  pantoprazole    Tablet 40 milliGRAM(s) Oral <User Schedule>  phenytoin   Capsule 200 milliGRAM(s) Oral two times a day  polyethylene glycol 3350 17 Gram(s) Oral daily  predniSONE   Tablet 10 milliGRAM(s) Oral daily  senna 2 Tablet(s) Oral at bedtime  sirolimus 7 milliGRAM(s) Oral <User Schedule>  trimethoprim   80 mG/sulfamethoxazole 400 mG 1 Tablet(s) Oral daily  valGANciclovir 450 milliGRAM(s) Oral <User Schedule>    MEDICATIONS  (PRN):  acetaminophen     Tablet .. 650 milliGRAM(s) Oral every 6 hours PRN Temp greater or equal to 38C (100.4F), Mild Pain (1 - 3)  dextrose Oral Gel 15 Gram(s) Oral once PRN Blood Glucose LESS THAN 70 milliGRAM(s)/deciliter

## 2022-09-09 NOTE — DIETITIAN INITIAL EVALUATION ADULT - NS FNS DIET ORDER
Diet, Consistent Carbohydrate w/Evening Snack:   1000mL Fluid Restriction ([id number])  No Concentrated Phosphorus (09-08-22 @ 16:17)  Recommend Initiate Nepro 8oz TID (Provides 1,275kcal & 57grams of Protein)   Education Provided on Need for Supplementation & Consistent Carbohydrate, Low Phosphorus Diet

## 2022-09-09 NOTE — CONSULT NOTE ADULT - ASSESSMENT
ASSESSMENT/PLAN  66 yr old Male with PMHx of DM type II, HTN, CAD s/p stents/ CABG (2020), AFib on Eliquis, CVA (2019) d/t Afib, Seizure d/o following CVA, last episode was on 4/8/22, h/o GIB (2/2022) EGD with duodenal ulcer.  ESRD due to DM was on HD since 2019, s/p premacath removal 5/10/22 s/p Right DDRT (4/21/22) and placed on belatacept.  Frequent hospitalization d/t weakness, refractory sz, and sepsis.  Admitted and intubated 6/10, found to large pleural effusion s/p thoracentesis ~1.3 and VATS of thoracic 2.2 L.      COMORBIDITES/ACTIVE MEDICAL ISSUES     Gait Instability, ADL impairments and Functional impairments secondary to recurrent UTI, status epilepticus, with debility seizures. 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  Belatacept d/t 8/1 and was started on Sirolimus 7mg QD  - Prednisone 10  - Cellcept 250 BID on hold  - S/p perma cath placement 8/30/2022  - PPx medications: Nystatin, Bactrim, Valcyte ([facility])  - Nephrology consulted    #Anemia of chronic disease  - Has gotten total of 6U PRBC and 2 U FFP during acute hospital   - monitor H/H    #HTN  #Afib  - Eliquis switched to Coumadin  - INR 3.02 (9/8) + Coumadin dose- 7mg for 9/8  - Coreg     #Diabetes  - A1C  - Lantus  - Admelog  - FS + ISS    #Hypothyroidism  - Synthroid 37.5 mcg    #Pain control  - Tylenol PRN    #GI/Bowel Mgmt   - At risk for constipation due to neurologic diagnosis, immobility and/or medication use  - Senna,  Miralax PRN    #Bladder management  - PVR q 8 hours (SC if > 400)  - Encourage timed voids Q4hrs while awake for independence and to promote continence during therapy    #DVT   - acute DVT to R IJ thrombosis, Superficial vein thrombus to right basilic and cephalic vein  - Coumadin  - SCDs, TEDs     #Skin:  -Stage 2 pressure ulcer to sacrum 0.3 x 0.2- cleanse with NS, apply wet gauze andcover with allevyn foam dressing  -At risk for pressure injury due to neurologic diagnosis and relative immobility  -Bilateral heels - soft heel protectors, apply liquid barrier film daily and monitor for tissue type changes  - Turn Q2 hr while in bed, air mattress  - Skin barrier cream as needed  - Nursing to monitor skin Qshift    Diet  - Regular + Thins  [CCHO, DASH/TLC]    + Fluid Restriction: 1L    Precautions / PROPHYLAXIS:   - Falls, Cardiac, Sternal, Spinal, Seizure   - ortho: Weight bearing status: WBAT   - Lungs: Aspiration, Incentive Spirometer   - Pressure injury/Skin: Turn Q2hrs while in bed, OOB to Chair, PT/OT      Follow ups:    Andre Patterson)  Clinical Neurophysiology; EEGEpilepsy; Neurology  [address], [address]  [place] [address]  Phone: [phone]  Fax: [phone]  Follow Up Time:     Padmini Lincoln ()  Internal Medicine  [address], [address]  [place] [address]  Phone: [phone]  Fax: [phone]  Follow Up Time:     Iker Alfred)  Internal Medicine; Nephrology  [address] [place] [address]  Phone: [phone]  Fax: [phone]  Follow Up Time:    Iker Alfred  [place] Physician [facility]  NEPHRO [address] [place] [address] D  Scheduled Appointment: 09/20/2022    Iker Alfred  [place] [facility]  NEPHRO [address]  Scheduled Appointment: 10/20/2022    MEDICAL PROGNOSIS: GOOD            REHAB POTENTIAL: GOOD             ESTIMATED DISPOSITION: HOME WITH HOME CARE            ELOS: 10-14 Days   EXPECTED THERAPY:     P.T. 1hr/day       O.T. 1hr/day      S.L.P. 1hr/day     P&O Unnecessary     EXP FREQUENCY: 5 days per 7 day period     PRESCREEN COMPARISION:   I have reviewed the prescreen information and I have found no relevant changes between the preadmission screening and my post admission evaluation     RATIONALE FOR INPATIENT ADMISSION - Patient demonstrates the following: (check all that apply)  [X] Medically appropriate for rehabilitation admission  [X] Has attainable rehab goals with an appropriate initial discharge plan  [X] Has rehabilitation potential (expected to make a significant improvement within a reasonable period of time)   [X] Requires close medical management by a rehab physician, rehab nursing care, Hospitalist and comprehensive interdisciplinary team (including PT, OT, & or SLP, Prosthetics and Orthotics)   66 yr old Male with PMHx of DM 2, HTN, CAD s/p stents/ CABG (2020), AFib on Eliquis, CVA (2019) d/t Afib, Seizure following CVA, last episode was on 4/8/22, h/o GIB (2/2022) EGD with duodenal ulcer.  ESRD due to DM was on HD since 2019, s/p premacath removal 5/10/22 s/p Right DDRT (4/21/22) and placed on belatacept.  Frequent hospitalization d/t weakness, refractory sz, and sepsis.  Admitted and intubated 6/10, found to large pleural effusion s/p thoracentesis ~1.3 and VATS of thoracic 2.2 L.  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  Belatacept d/t 8/1 and was started on Sirolimus 7mg QD  - Prednisone 10  - Cellcept 250 BID on hold  - S/p perma cath placement 8/30/2022  - PPx medications: Nystatin, Bactrim, Valcyte (MWF)  - Nephrology consulT    #Anemia of chronic disease  - S/P  6U PRBC and 2 U FFP during acute hospital   - monitor H/H    #HTN  #Afib  - Eliquis switched to Coumadin  - INR NOTED,   - DOSE COUMADIN DAILY  - Coreg     #Diabetes  - A1C  - accuchecks noted  - HOME MED Lantus 4 U HAS AND NOVOLOG PREMEAL TID  - D/C PREMEAL, WILL CHANGE LANTUS TO 10 U HS.    - FS + ISS    #Hypothyroidism  - Synthroid    #Pain control  - Tylenol PRN    #sacral DU-  - manage per wound care and primary team  #DVT   - acute DVT to R IJ thrombosis, Superficial vein thrombus to right basilic and cephalic vein  - Coumadin  - SCDs, TEDs     will follow  d/w dr. joiner 66 yr old Male with PMHx of DM 2, HTN, CAD s/p stents/ CABG (2020), AFib on Eliquis, CVA (2019) d/t Afib, Seizure following CVA, last episode was on 4/8/22, h/o GIB (2/2022) EGD with duodenal ulcer.  ESRD due to DM was on HD since 2019, s/p premacath removal 5/10/22 s/p Right DDRT (4/21/22) and placed on belatacept.  Frequent hospitalization d/t weakness, refractory sz, and sepsis.  Admitted and intubated 6/10, found to large pleural effusion s/p thoracentesis ~1.3 and VATS of thoracic 2.2 L.  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  Belatacept d/t 8/1 and was started on Sirolimus 7mg QD  - Prednisone 10  - Cellcept 250 BID on hold  - S/p perma cath placement 8/30/2022  - PPx medications: Nystatin, Bactrim, Valcyte (MWF)  - Nephrology consulT    #Anemia of chronic disease  - S/P  6U PRBC and 2 U FFP during acute hospital   - monitor H/H    #HTN  #Afib  - Eliquis switched to Coumadin  - INR NOTED,   - DOSE COUMADIN DAILY  - Coreg     #Diabetes  - A1C  - accuchecks noted  - HOME MED Lantus AND NOVOLOG PREMEAL TID  - WILL CHANGE LANTUS TO 10 U HS and premeal 4 u tid   - FS + ISS    #Hypothyroidism  - Synthroid    #Pain control  - Tylenol PRN    #sacral DU-  - manage per wound care and primary team  #DVT   - acute DVT to R IJ thrombosis, Superficial vein thrombus to right basilic and cephalic vein  - Coumadin  - SCDs, TEDs     will follow  d/w dr. joiner

## 2022-09-09 NOTE — DIETITIAN INITIAL EVALUATION ADULT - ORAL INTAKE PTA/DIET HISTORY
Patient Does Not Follow Diet @Home  Consumes 3 Meals a Day   Wife Usually Cooks For Patient   Does Take Nephro Supplements @Home

## 2022-09-09 NOTE — DIETITIAN INITIAL EVALUATION ADULT - OTHER INFO
Initial Nutrition Assessment   66yr Old Male   Denies Food Allergy/Intolerance  Tolerates Diet Well - No Significant Chewing/Swallowing Complications (Per Patient)  Stage 2 Pressure Ulcers on Sacrum (as Per Nursing Flow Sheets)  No Edema Noted (as Per Nursing Flow Sheets)  No Recent Vomiting/Diarrhea/Constipation & Some Recent Nausea (as Per Patient)

## 2022-09-09 NOTE — DIETITIAN INITIAL EVALUATION ADULT - NSPROEDALEARNPREF_GEN_A_NUR
Education Provided on Need for Supplementation & Consistent Carbohydrate, Low Phosphorus Diet/verbal instruction

## 2022-09-09 NOTE — CONSULT NOTE ADULT - SUBJECTIVE AND OBJECTIVE BOX
NEPHROLOGY CONSULTATION    CHIEF COMPLAINT:    HPI:  Pt is 67 yo M with PMH of DM type II, HTN, CAD s/p stents/ CABG (), AFib on Eliquis, CVA () d/t Afib, Seizure d/o following CVA, last episode was 22, h/o GIB (2022) s/p EGD with duodenal ulcer. ESRD due to DM was on HD since , s/p shiva cath removal 5/10/22 s/p Right DDRT (22) and placed on belatacept.  Recent hospitalization 22 -5/10/22 with weakness/anemia found to have perinephric hematoma requiring evacuation and repair of bleeding arterial anastomosis. Was discharged to [facility], currently being treated for UTI with Levaquin, developed multiple sz episodes requiring intubation. He also had Parainfluenza PNA. Patient readmitted 6/10 w/sz was unable to protect airway, and was intubated.  patient was extubated and had thoracentesis (1.3L) for right pleural effusion - post procedure he needed 5U PRBC and 2 U FFP.  Was reintubated  - CT w/hemothorax.  S/p VATS with thoracic 2.2 L old blood removed and 2nd chest tube placed (removed ). R IJ DVT () - heparin switched to lovenox.  Noted with refractory Sz and AMS debility and placed on tube feed (7/10). Hospital course complicated by sepsis - UTI treated with Ertapenem. Despite abx, he had intermittent fevers. Restarted HD 22.  Immunosuppressive tx switched to Sirolimus. Noted again with refractory seizures on , Brivaracetam started . AC switched from eliquis to coumadin. Adm to AR 22.     ROS:  as above    Allergies:  No Known Allergies    PAST MEDICAL & SURGICAL HISTORY:  Hypertension  Diabetes  Dyslipidemia  CAD (Coronary Artery Disease)  with Stents in 2009 and 2019, s/p off-pump C3L on 20  Hypothyroidism  CVA (cerebral vascular accident)  19 with residual bilateral weakness  Anemia  PEG (percutaneous endoscopic gastrostomy) status  removed 2020  Intubation of airway performed without difficulty  Dec 2019  CVA c/b status epilepticus requiring intubation and PEG in 2019-  ESRD on dialysis  M/W/F  Seizures  after CVA, last seizure was last week 22  History of insertion of stent into coronary artery bypass graft   and   S/P CABG x 3  off pump C3L on 20    SOCIAL HISTORY:  negative    FAMILY HISTORY:  Family history of cancer of tongue (Father)  Parents are  . Father - at 80 years, tongue cancer was a smoker. Mother- at 71, had accident while crossing road. Siblings- 2 brothers and one sister.    MEDICATIONS  (STANDING):  amLODIPine   Tablet 10 milliGRAM(s) Oral daily  artificial  tears Solution 2 Drop(s) Both EYES every 6 hours  brivaracetam 50 milliGRAM(s) Oral two times a day  carvedilol 12.5 milliGRAM(s) Oral every 12 hours  chlorhexidine 2% Cloths 1 Application(s) Topical <User Schedule>  dextrose 5%. 1000 milliLiter(s) (100 mL/Hr) IV Continuous <Continuous>  dextrose 5%. 1000 milliLiter(s) (50 mL/Hr) IV Continuous <Continuous>  dextrose 50% Injectable 25 Gram(s) IV Push once  dextrose 50% Injectable 12.5 Gram(s) IV Push once  dextrose 50% Injectable 25 Gram(s) IV Push once  doxazosin 4 milliGRAM(s) Oral <User Schedule>  epoetin cortney-epbx (RETACRIT) Injectable 48043 Unit(s) SubCutaneous <User Schedule>  finasteride 5 milliGRAM(s) Oral daily  furosemide    Tablet 80 milliGRAM(s) Oral daily  glucagon  Injectable 1 milliGRAM(s) IntraMuscular once  insulin glargine Injectable (LANTUS) 3 Unit(s) SubCutaneous at bedtime  insulin lispro (ADMELOG) corrective regimen sliding scale   SubCutaneous three times a day before meals  insulin lispro (ADMELOG) corrective regimen sliding scale   SubCutaneous at bedtime  insulin lispro Injectable (ADMELOG) 10 Unit(s) SubCutaneous before breakfast  insulin lispro Injectable (ADMELOG) 8 Unit(s) SubCutaneous before lunch  insulin lispro Injectable (ADMELOG) 9 Unit(s) SubCutaneous before dinner  levothyroxine 50 MICROGram(s) Oral daily  melatonin 6 milliGRAM(s) Oral at bedtime  multivitamin 1 Tablet(s) Oral daily  mycophenolate mofetil 500 milliGRAM(s) Oral <User Schedule>  nystatin    Suspension 931763 Unit(s) Oral four times a day  pantoprazole    Tablet 40 milliGRAM(s) Oral <User Schedule>  phenytoin   Capsule 200 milliGRAM(s) Oral two times a day  polyethylene glycol 3350 17 Gram(s) Oral daily  predniSONE   Tablet 10 milliGRAM(s) Oral daily  senna 2 Tablet(s) Oral at bedtime  sirolimus 7 milliGRAM(s) Oral <User Schedule>  trimethoprim   80 mG/sulfamethoxazole 400 mG 1 Tablet(s) Oral daily  valGANciclovir 450 milliGRAM(s) Oral <User Schedule>  warfarin 7 milliGRAM(s) Oral once    Vital Signs Last 24 Hrs  T(C): 36.4 (22 @ 08:10), Max: 36.7 (22 @ 17:31)  T(F): 97.5 (22 @ 08:10), Max: 98 (22 @ 17:31)  HR: 60 (22 @ 08:10) (60 - 65)  BP: 155/73 (22 @ 08:10) (128/63 - 162/70)  RR: 16 (22 @ 08:10) (16 - 20)  SpO2: 94% (22 @ 08:10) (94% - 97%)    LABS:                        8.4    6.41  )-----------( 423      ( 09 Sep 2022 06:30 )             26.3         130<L>  |  90<L>  |  84<H>  ----------------------------<  156<H>  4.6   |  23  |  5.12<H>    Ca    8.6      09 Sep 2022 06:30  Phos  4.8       Mg     2.0         TPro  6.7  /  Alb  1.9<L>  /  TBili  0.4  /  DBili  x   /  AST  25  /  ALT  30  /  AlkPhos  515<H>      LIVER FUNCTIONS - ( 09 Sep 2022 06:30 )  Alb: 1.9 g/dL / Pro: 6.7 g/dL / ALK PHOS: 515 U/L / ALT: 30 U/L / AST: 25 U/L / GGT: x           PT/INR - ( 09 Sep 2022 06:30 )   PT: 33.1 sec;   INR: 2.82 ratio       PTT - ( 08 Sep 2022 06:02 )  PTT:40.8 sec    A/P:    full consult to follow    [phone]       NEPHROLOGY CONSULTATION    CHIEF COMPLAINT: debility    HPI:  Pt is 65 yo M with PMH of DM type II, HTN, CAD s/p stents/ CABG (), AFib on Eliquis, CVA () d/t Afib, seizure d/o following CVA, last episode was 22, h/o GIB (2022) s/p EGD with duodenal ulcer. ESRD due to DM was on HD since , s/p shiva cath removal 5/10/22 s/p DDRT (22) and placed on belatacept.  Recent hospitalization 22 -5/10/22 with weakness/anemia found to have perinephric hematoma requiring evacuation and repair of bleeding arterial anastomosis. Was [facility] to [facility]", developed multiple sz episodes requiring intubation. He also had Parainfluenza PNA. Patient readmitted 6/10 w/sz was unable to protect airway, and was intubated.  patient was extubated and had thoracentesis (1.3L) for right pleural effusion - post procedure he needed 5U PRBC and 2 U FFP.  Was reintubated  - CT w/hemothorax.  S/p VATS with thoracic 2.2 L old blood removed and 2nd chest tube placed (removed ). R IJ DVT () - heparin switched to lovenox.  Noted with refractory Sz and AMS debility and placed on tube feed (7/10). Hospital course complicated by sepsis - UTI treated with Ertapenem. Despite abx, he had intermittent fevers. Restarted HD 22. Immunosuppressive tx switched to Sirolimus. Noted again with refractory seizures on , Brivaracetam started . AC switched from eliquis to coumadin. Adm to AR 22. Awake, alert, denies CP, SOB, N/V/F/C. Last HD 22.    ROS:  as above    Allergies:  No Known Allergies    PAST MEDICAL & SURGICAL HISTORY:  Hypertension  Diabetes  Dyslipidemia  CAD (Coronary Artery Disease)  with Stents in 2009 and 2019, s/p off-pump C3L on 20  Hypothyroidism  CVA (cerebral vascular accident)  19 with residual bilateral weakness  Anemia  PEG (percutaneous endoscopic gastrostomy) status  removed 2020  Intubation of airway performed without difficulty  Dec 2019  CVA c/b status epilepticus requiring intubation and PEG in 2019-  ESRD on dialysis  M/W/F  Seizures  after CVA, last seizure was last week 22  History of insertion of stent into coronary artery bypass graft   and   S/P CABG x 3  off pump C3L on 20    SOCIAL HISTORY:  negative    FAMILY HISTORY:  Family history of cancer of tongue (Father)  Parents are  . Father - at 80 years, tongue cancer was a smoker. Mother- at 71, had accident while crossing road. Siblings- 2 brothers and one sister.    MEDICATIONS  (STANDING):  amLODIPine   Tablet 10 milliGRAM(s) Oral daily  artificial  tears Solution 2 Drop(s) Both EYES every 6 hours  brivaracetam 50 milliGRAM(s) Oral two times a day  carvedilol 12.5 milliGRAM(s) Oral every 12 hours  chlorhexidine 2% Cloths 1 Application(s) Topical <User Schedule>  dextrose 5%. 1000 milliLiter(s) (100 mL/Hr) IV Continuous <Continuous>  dextrose 5%. 1000 milliLiter(s) (50 mL/Hr) IV Continuous <Continuous>  dextrose 50% Injectable 25 Gram(s) IV Push once  dextrose 50% Injectable 12.5 Gram(s) IV Push once  dextrose 50% Injectable 25 Gram(s) IV Push once  doxazosin 4 milliGRAM(s) Oral <User Schedule>  epoetin cortney-epbx (RETACRIT) Injectable 53250 Unit(s) SubCutaneous <User Schedule>  finasteride 5 milliGRAM(s) Oral daily  furosemide    Tablet 80 milliGRAM(s) Oral daily  glucagon  Injectable 1 milliGRAM(s) IntraMuscular once  insulin glargine Injectable (LANTUS) 3 Unit(s) SubCutaneous at bedtime  insulin lispro (ADMELOG) corrective regimen sliding scale   SubCutaneous three times a day before meals  insulin lispro (ADMELOG) corrective regimen sliding scale   SubCutaneous at bedtime  insulin lispro Injectable (ADMELOG) 10 Unit(s) SubCutaneous before breakfast  insulin lispro Injectable (ADMELOG) 8 Unit(s) SubCutaneous before lunch  insulin lispro Injectable (ADMELOG) 9 Unit(s) SubCutaneous before dinner  levothyroxine 50 MICROGram(s) Oral daily  melatonin 6 milliGRAM(s) Oral at bedtime  multivitamin 1 Tablet(s) Oral daily  mycophenolate mofetil 500 milliGRAM(s) Oral <User Schedule>  nystatin    Suspension 851548 Unit(s) Oral four times a day  pantoprazole    Tablet 40 milliGRAM(s) Oral <User Schedule>  phenytoin   Capsule 200 milliGRAM(s) Oral two times a day  polyethylene glycol 3350 17 Gram(s) Oral daily  predniSONE   Tablet 10 milliGRAM(s) Oral daily  senna 2 Tablet(s) Oral at bedtime  sirolimus 7 milliGRAM(s) Oral <User Schedule>  trimethoprim   80 mG/sulfamethoxazole 400 mG 1 Tablet(s) Oral daily  valGANciclovir 450 milliGRAM(s) Oral <User Schedule>  warfarin 7 milliGRAM(s) Oral once    Vital Signs Last 24 Hrs  T(C): 36.4 (22 @ 08:10), Max: 36.7 (22 @ 17:31)  T(F): 97.5 (22 @ 08:10), Max: 98 (22 @ 17:31)  HR: 60 (22 @ 08:10) (60 - 65)  BP: 155/73 (22 @ 08:10) (128/63 - 162/70)  RR: 16 (22 @ 08:10) (16 - 20)  SpO2: 94% (22 @ 08:10) (94% - 97%)    s1s2  b/l air entry  soft, ND  no edema  alert    LABS:                        8.4    6.41  )-----------( 423      ( 09 Sep 2022 06:30 )             26.3         130<L>  |  90<L>  |  84<H>  ----------------------------<  156<H>  4.6   |  23  |  5.12<H>    Ca    8.6      09 Sep 2022 06:30  Phos  4.8       Mg     2.0         TPro  6.7  /  Alb  1.9<L>  /  TBili  0.4  /  DBili  x   /  AST  25  /  ALT  30  /  AlkPhos  515<H>      LIVER FUNCTIONS - ( 09 Sep 2022 06:30 )  Alb: 1.9 g/dL / Pro: 6.7 g/dL / ALK PHOS: 515 U/L / ALT: 30 U/L / AST: 25 U/L / GGT: x           PT/INR - ( 09 Sep 2022 06:30 )   PT: 33.1 sec;   INR: 2.82 ratio       PTT - ( 08 Sep 2022 06:02 )  PTT:40.8 sec    A/P:    S/p complicated hospital course as per HPI  Now in AR  S/p DDRT 22, required HD, off HD since 22  Back on HD since 22  Now in rehab  Will continue transplant meds per transplant team recommendations  Pt w/some UO  Avoid nephrotoxins  Epoetin w/HD  Renal diet  HD tomorrow as ordered  Bld work w/HD   D/w family at bedside    [phone]

## 2022-09-09 NOTE — DIETITIAN INITIAL EVALUATION ADULT - PERTINENT LABORATORY DATA
09-09    130<L>  |  90<L>  |  84<H>  ----------------------------<  156<H>  4.6   |  23  |  5.12<H>    Ca    8.6      09 Sep 2022 06:30  Phos  4.8     09-08  Mg     2.0     09-08    TPro  6.7  /  Alb  1.9<L>  /  TBili  0.4  /  DBili  x   /  AST  25  /  ALT  30  /  AlkPhos  515<H>  09-09  POCT Blood Glucose.: 248 mg/dL (09-09-22 @ 09:26)  A1C with Estimated Average Glucose Result: 6.4 % (09-04-22 @ 06:08)  A1C with Estimated Average Glucose Result: 6.0 % (06-30-22 @ 05:49)  A1C with Estimated Average Glucose Result: 6.6 % (04-24-22 @ 17:12)

## 2022-09-09 NOTE — DIETITIAN NUTRITION RISK NOTIFICATION - TREATMENT: THE FOLLOWING DIET HAS BEEN RECOMMENDED
Recommend Initiate Nepro 8oz TID (Provides 1,275kcal & 57grams of Protein) |  Education Provided on Need for Supplementation

## 2022-09-09 NOTE — DIETITIAN INITIAL EVALUATION ADULT - ADD RECOMMEND
1) Monitor Weights, Intake, Tolerance, Skin, POCT & Labwork  2) Nepro 8oz TID   3) Education Provided on Need for Supplementation & Consistent Carbohydrate, Low Phosphorus Diet  4) Continue Nutrition Plan of Care

## 2022-09-10 NOTE — PROGRESS NOTE ADULT - SUBJECTIVE AND OBJECTIVE BOX
Patient is a 66y old  Male who presents with a chief complaint of Debility (09 Sep 2022 13:40)      Patient seen and examined at bedside.    ALLERGIES:  No Known Allergies    MEDICATIONS  (STANDING):  amLODIPine   Tablet 10 milliGRAM(s) Oral daily  artificial  tears Solution 2 Drop(s) Both EYES every 6 hours  brivaracetam 50 milliGRAM(s) Oral two times a day  carvedilol 12.5 milliGRAM(s) Oral every 12 hours  chlorhexidine 2% Cloths 1 Application(s) Topical <User Schedule>  dextrose 5%. 1000 milliLiter(s) (50 mL/Hr) IV Continuous <Continuous>  dextrose 5%. 1000 milliLiter(s) (100 mL/Hr) IV Continuous <Continuous>  dextrose 50% Injectable 25 Gram(s) IV Push once  dextrose 50% Injectable 12.5 Gram(s) IV Push once  dextrose 50% Injectable 25 Gram(s) IV Push once  doxazosin 4 milliGRAM(s) Oral <User Schedule>  epoetin cortney-epbx (RETACRIT) Injectable 61820 Unit(s) IV Push <User Schedule>  finasteride 5 milliGRAM(s) Oral daily  furosemide    Tablet 80 milliGRAM(s) Oral daily  glucagon  Injectable 1 milliGRAM(s) IntraMuscular once  insulin glargine Injectable (LANTUS) 10 Unit(s) SubCutaneous at bedtime  insulin lispro (ADMELOG) corrective regimen sliding scale   SubCutaneous three times a day before meals  insulin lispro (ADMELOG) corrective regimen sliding scale   SubCutaneous at bedtime  insulin lispro Injectable (ADMELOG) 4 Unit(s) SubCutaneous three times a day before meals  levothyroxine 50 MICROGram(s) Oral daily  melatonin 6 milliGRAM(s) Oral at bedtime  multivitamin 1 Tablet(s) Oral daily  mycophenolate mofetil 500 milliGRAM(s) Oral <User Schedule>  nystatin    Suspension 654102 Unit(s) Oral four times a day  pantoprazole    Tablet 40 milliGRAM(s) Oral <User Schedule>  phenytoin   Capsule 200 milliGRAM(s) Oral two times a day  polyethylene glycol 3350 17 Gram(s) Oral daily  predniSONE   Tablet 10 milliGRAM(s) Oral daily  senna 2 Tablet(s) Oral at bedtime  sirolimus 7 milliGRAM(s) Oral <User Schedule>  trimethoprim   80 mG/sulfamethoxazole 400 mG 1 Tablet(s) Oral daily  valGANciclovir 450 milliGRAM(s) Oral <User Schedule>    MEDICATIONS  (PRN):  acetaminophen     Tablet .. 650 milliGRAM(s) Oral every 6 hours PRN Temp greater or equal to 38C (100.4F), Mild Pain (1 - 3)  dextrose Oral Gel 15 Gram(s) Oral once PRN Blood Glucose LESS THAN 70 milliGRAM(s)/deciliter    Vital Signs Last 24 Hrs  T(F): 97.7 (09 Sep 2022 21:50), Max: 97.7 (09 Sep 2022 21:50)  HR: 62 (10 Sep 2022 05:46) (60 - 65)  BP: 143/57 (10 Sep 2022 05:46) (143/57 - 165/71)  RR: 16 (09 Sep 2022 21:50) (16 - 16)  SpO2: 98% (09 Sep 2022 21:50) (94% - 98%)  I&O's Summary      PHYSICAL EXAM:  GENERAL- NAD  EAR/NOSE/MOUTH/THROAT - no pharyngeal exudates, no oral leisions,  MMM  EYES- PEDRO, conjunctiva and Sclera clear  NECK- supple  RESPIRATORY-  clear to auscultation bilaterally, non laboured breathing  CARDIOVASCULAR - SIS2, RRR  GI - soft NT BS present, abdominal wall edema right flank and RLQ.   EXTREMITIES- no pedal edema  NEUROLOGY- mild LUE weakness compared to RUE  SKIN- sacral DU  stage 2   PSYCHIATRY- AAO X 3  MUSCULOSKELETAL- AROM normal    LABS:                        8.4    6.41  )-----------( 423      ( 09 Sep 2022 06:30 )             26.3       09-09    130  |  90  |  84  ----------------------------<  156  4.6   |  23  |  5.12    Ca    8.6      09 Sep 2022 06:30  Phos  4.8     09-08  Mg     2.0     09-08    TPro  6.7  /  Alb  1.9  /  TBili  0.4  /  DBili  x   /  AST  25  /  ALT  30  /  AlkPhos  515  09-09       PT/INR - ( 09 Sep 2022 06:30 )   PT: 33.1 sec;   INR: 2.82 ratio    PTT - ( 08 Sep 2022 06:02 )  PTT:40.8 sec     07-27 Chol -- LDL -- HDL -- Trig 118 mg/dL    POCT Blood Glucose.: 256 mg/dL (09 Sep 2022 21:49)  POCT Blood Glucose.: 345 mg/dL (09 Sep 2022 17:30)  POCT Blood Glucose.: 206 mg/dL (09 Sep 2022 12:22)  POCT Blood Glucose.: 248 mg/dL (09 Sep 2022 09:26)  POCT Blood Glucose.: 183 mg/dL (09 Sep 2022 07:55)    COVID-19 PCR: NotDetec (09-09-22 @ 07:43)  COVID-19 PCR: NotDetec (09-07-22 @ 18:29)  COVID-19 PCR: NotDetec (09-01-22 @ 07:05)  COVID-19 PCR: NotDetec (08-29-22 @ 11:09)  COVID-19 PCR: NotDetec (08-25-22 @ 18:36)  COVID-19 PCR: NotDetec (09-09-22 @ 07:43)  COVID-19 PCR: NotDetec (09-07-22 @ 18:29)  COVID-19 PCR: NotDetec (09-01-22 @ 07:05)  COVID-19 PCR: NotDetec (08-29-22 @ 11:09)  COVID-19 PCR: NotDetec (08-25-22 @ 18:36)  COVID-19 PCR: NotDetec (08-22-22 @ 06:42)  COVID-19 PCR: NotDetec (08-15-22 @ 17:07)  COVID-19 PCR: NotDetec (08-07-22 @ 18:05)  COVID-19 PCR: NotDetec (07-25-22 @ 11:51)  COVID-19 PCR: NotDetec (07-23-22 @ 07:55)        Care Discussed with Consultants/Other Providers:   1.Bactrim and valganciclovir renally dosed for prophylaxis  2. Will defer to transplant team on frequency of CMV serum viral loads they desire  3. Baseline strongyloides serology-did not see in sunrise  4.Additional ID w/u pending course  5.Thanks, will follow from ID viewpoint Patient is a 66y old  Male who presents with a chief complaint of Debility (09 Sep 2022 13:40)    No events overnight  had a BM this AM  Denies chest pain, SOB  Patient seen and examined at bedside.    ALLERGIES:  No Known Allergies    MEDICATIONS  (STANDING):  amLODIPine   Tablet 10 milliGRAM(s) Oral daily  artificial  tears Solution 2 Drop(s) Both EYES every 6 hours  brivaracetam 50 milliGRAM(s) Oral two times a day  carvedilol 12.5 milliGRAM(s) Oral every 12 hours  chlorhexidine 2% Cloths 1 Application(s) Topical <User Schedule>  dextrose 5%. 1000 milliLiter(s) (50 mL/Hr) IV Continuous <Continuous>  dextrose 5%. 1000 milliLiter(s) (100 mL/Hr) IV Continuous <Continuous>  dextrose 50% Injectable 25 Gram(s) IV Push once  dextrose 50% Injectable 12.5 Gram(s) IV Push once  dextrose 50% Injectable 25 Gram(s) IV Push once  doxazosin 4 milliGRAM(s) Oral <User Schedule>  epoetin cortney-epbx (RETACRIT) Injectable 40784 Unit(s) IV Push <User Schedule>  finasteride 5 milliGRAM(s) Oral daily  furosemide    Tablet 80 milliGRAM(s) Oral daily  glucagon  Injectable 1 milliGRAM(s) IntraMuscular once  insulin glargine Injectable (LANTUS) 10 Unit(s) SubCutaneous at bedtime  insulin lispro (ADMELOG) corrective regimen sliding scale   SubCutaneous three times a day before meals  insulin lispro (ADMELOG) corrective regimen sliding scale   SubCutaneous at bedtime  insulin lispro Injectable (ADMELOG) 4 Unit(s) SubCutaneous three times a day before meals  levothyroxine 50 MICROGram(s) Oral daily  melatonin 6 milliGRAM(s) Oral at bedtime  multivitamin 1 Tablet(s) Oral daily  mycophenolate mofetil 500 milliGRAM(s) Oral <User Schedule>  nystatin    Suspension 692560 Unit(s) Oral four times a day  pantoprazole    Tablet 40 milliGRAM(s) Oral <User Schedule>  phenytoin   Capsule 200 milliGRAM(s) Oral two times a day  polyethylene glycol 3350 17 Gram(s) Oral daily  predniSONE   Tablet 10 milliGRAM(s) Oral daily  senna 2 Tablet(s) Oral at bedtime  sirolimus 7 milliGRAM(s) Oral <User Schedule>  trimethoprim   80 mG/sulfamethoxazole 400 mG 1 Tablet(s) Oral daily  valGANciclovir 450 milliGRAM(s) Oral <User Schedule>    MEDICATIONS  (PRN):  acetaminophen     Tablet .. 650 milliGRAM(s) Oral every 6 hours PRN Temp greater or equal to 38C (100.4F), Mild Pain (1 - 3)  dextrose Oral Gel 15 Gram(s) Oral once PRN Blood Glucose LESS THAN 70 milliGRAM(s)/deciliter    Vital Signs Last 24 Hrs  T(F): 97.7 (09 Sep 2022 21:50), Max: 97.7 (09 Sep 2022 21:50)  HR: 62 (10 Sep 2022 05:46) (60 - 65)  BP: 143/57 (10 Sep 2022 05:46) (143/57 - 165/71)  RR: 16 (09 Sep 2022 21:50) (16 - 16)  SpO2: 98% (09 Sep 2022 21:50) (94% - 98%)  I&O's Summary      PHYSICAL EXAM:  GENERAL- NAD, weak appearing  EAR/NOSE/MOUTH/THROAT - no pharyngeal exudates, no oral leisions,  MMM  EYES- PEDRO, conjunctiva and Sclera clear  NECK- supple  RESPIRATORY-  clear to auscultation bilaterally, non laboured breathing  CARDIOVASCULAR - SIS2, RRR  GI - soft NT BS present, abdominal wall edema right flank and RLQ.   EXTREMITIES- no pedal edema  NEUROLOGY- mild LUE weakness compared to RUE  SKIN- sacral DU  stage 2   PSYCHIATRY- AAO X 3  MUSCULOSKELETAL- AROM normal    LABS:                        8.4    6.41  )-----------( 423      ( 09 Sep 2022 06:30 )             26.3       09-09    130  |  90  |  84  ----------------------------<  156  4.6   |  23  |  5.12    Ca    8.6      09 Sep 2022 06:30  Phos  4.8     09-08  Mg     2.0     09-08    TPro  6.7  /  Alb  1.9  /  TBili  0.4  /  DBili  x   /  AST  25  /  ALT  30  /  AlkPhos  515  09-09       PT/INR - ( 09 Sep 2022 06:30 )   PT: 33.1 sec;   INR: 2.82 ratio    PTT - ( 08 Sep 2022 06:02 )  PTT:40.8 sec     07-27 Chol -- LDL -- HDL -- Trig 118 mg/dL    POCT Blood Glucose.: 256 mg/dL (09 Sep 2022 21:49)  POCT Blood Glucose.: 345 mg/dL (09 Sep 2022 17:30)  POCT Blood Glucose.: 206 mg/dL (09 Sep 2022 12:22)  POCT Blood Glucose.: 248 mg/dL (09 Sep 2022 09:26)  POCT Blood Glucose.: 183 mg/dL (09 Sep 2022 07:55)    COVID-19 PCR: NotDetec (09-09-22 @ 07:43)  COVID-19 PCR: NotDetec (09-07-22 @ 18:29)  COVID-19 PCR: NotDetec (09-01-22 @ 07:05)  COVID-19 PCR: NotDetec (08-29-22 @ 11:09)  COVID-19 PCR: NotDetec (08-25-22 @ 18:36)  COVID-19 PCR: NotDetec (09-09-22 @ 07:43)  COVID-19 PCR: NotDetec (09-07-22 @ 18:29)  COVID-19 PCR: NotDetec (09-01-22 @ 07:05)  COVID-19 PCR: NotDetec (08-29-22 @ 11:09)  COVID-19 PCR: NotDetec (08-25-22 @ 18:36)  COVID-19 PCR: NotDetec (08-22-22 @ 06:42)  COVID-19 PCR: NotDetec (08-15-22 @ 17:07)  COVID-19 PCR: NotDetec (08-07-22 @ 18:05)  COVID-19 PCR: NotDetec (07-25-22 @ 11:51)  COVID-19 PCR: NotDetec (07-23-22 @ 07:55)        Care Discussed with Consultants/Other Providers:   1.Bactrim and valganciclovir renally dosed for prophylaxis  2. Will defer to transplant team on frequency of CMV serum viral loads they desire  3. Baseline strongyloides serology-did not see in sunrise  4.Additional ID w/u pending course  5.Thanks, will follow from ID viewpoint

## 2022-09-10 NOTE — PROGRESS NOTE ADULT - ASSESSMENT
66 yr old Male with PMHx of DM 2, HTN, CAD s/p stents/ CABG (2020), AFib on Eliquis, CVA (2019) d/t Afib, Seizure following CVA, last episode was on 4/8/22, h/o GIB (2/2022) EGD with duodenal ulcer.  ESRD due to DM was on HD since 2019, s/p premacath removal 5/10/22 s/p Right DDRT (4/21/22) and placed on belatacept.  Frequent hospitalization d/t weakness, refractory sz, and sepsis.  Admitted and intubated 6/10, found to large pleural effusion s/p thoracentesis ~1.3 and VATS of thoracic 2.2 L.  NOW ADMITTED FOR REHAB- PT/OT/DVT PPX    # Sepsis  - recurrent UTI treated with abx; bactrim one tab daily  - pleural effusion s/p thoracentesis 1.3L   - hemothorax s/p VATS 2.2 L  - BCx (8/14) negative    #RLQ cellulitis   - complete course of keflex 8/20 - 9/4  - MONITOR    #Seizure  - on Phenytoin  - Noted on EEG, +Brivaracetam (8/16)  - Additional Brivaracetam 50mg post each HD session  - seizure precaution    #Acute DVT to R IJ thrombosis, Superficial vein thrombus to right basilic and cephalic vein  - Dosing coumadin for goal INR 2-3    #ESRD was on HD til 4/29/2022 s/p DDRT  - restarted on HD (7/29)  - 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  Belatacept d/t 8/1 and was started on Sirolimus 7mg QD  - Prednisone 10  - Cellcept 250 BID on hold  - S/p perma cath placement 8/30/2022  - PPx medications: Nystatin, Bactrim, Valcyte (MWF)  - Nephrology consulT    #Anemia of chronic disease  - S/P  6U PRBC and 2 U FFP during acute hospital   - monitor H/H    #HTN  #Afib  - Eliquis switched to Coumadin  - INR NOTED, 2.82 9/9, today INR pending  - DOSE COUMADIN DAILY  - Coreg     #Diabetes  - A1C  - accuchecks noted  - HOME MED Lantus AND NOVOLOG PREMEAL TID  - WILL CHANGE LANTUS TO 10 U HS and premeal 4 u tid   - FS + ISS    #Hypothyroidism  - Synthroid    #Pain control  - Tylenol PRN    #sacral DU-  - manage per wound care and primary team    #DVT :  Coumadin       66 yr old Male with PMHx of DM 2, HTN, CAD s/p stents/ CABG (2020), AFib on Eliquis, CVA (2019) d/t Afib, Seizure following CVA, last episode was on 4/8/22, h/o GIB (2/2022) EGD with duodenal ulcer.  ESRD due to DM was on HD since 2019, s/p premacath removal 5/10/22 s/p Right DDRT (4/21/22) and placed on belatacept.  Frequent hospitalization d/t weakness, refractory sz, and sepsis.  Admitted and intubated 6/10, found to large pleural effusion s/p thoracentesis ~1.3 and VATS of thoracic 2.2 L.  NOW ADMITTED FOR REHAB- PT/OT/DVT PPX    # Sepsis  - recurrent UTI treated with abx; bactrim one tab daily  - pleural effusion s/p thoracentesis 1.3L   - hemothorax s/p VATS 2.2 L  - BCx (8/14) negative    #RLQ cellulitis   - complete course of keflex 8/20 - 9/4  - MONITOR    #Seizure  - on Phenytoin  - Noted on EEG, +Brivaracetam (8/16)  - Additional Brivaracetam 50mg post each HD session  - seizure precaution    #Acute DVT to R IJ thrombosis, Superficial vein thrombus to right basilic and cephalic vein  - Dosing coumadin for goal INR 2-3    #ESRD was on HD til 4/29/2022 s/p DDRT  - restarted on HD (7/29)  - 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  Belatacept d/t 8/1 and was started on Sirolimus 7mg QD  - Prednisone 10  - Cellcept 250 BID on hold  - S/p perma cath placement 8/30/2022  - PPx medications: Nystatin, Bactrim, Valcyte (MWF)  - Nephrology consulT    #Anemia of chronic disease  - S/P  6U PRBC and 2 U FFP during acute hospital   - monitor H/H    #HTN  #Afib  - Eliquis switched to Coumadin  - INR NOTED, 3.16 today, today   - Hold coumadin tonight, check INR in AM  - Coreg     #Diabetes  - A1C  - accuchecks noted  - HOME MED Lantus AND NOVOLOG PREMEAL TID  - WILL CHANGE LANTUS TO 10 U HS and premeal 4 u tid   - FS + ISS    #Hypothyroidism  - Synthroid    #Pain control  - Tylenol PRN    #sacral DU-  - manage per wound care and primary team    #DVT :  Coumadin being held tonight  Will check INR in AM

## 2022-09-10 NOTE — PROGRESS NOTE ADULT - ASSESSMENT
S/p complicated hospital course as per HPI  Now in [facility]  S/p DDRT 4/21/22, required HD, off HD since 5/5/22  Back on HD since 7/27/22  Now in rehab  HD today. To continue current meds. D/w patients family at bedside.

## 2022-09-10 NOTE — PROGRESS NOTE ADULT - SUBJECTIVE AND OBJECTIVE BOX
No overnight events.  dialysis today     REVIEW OF SYSTEMS  Constitutional - No fever,  No fatigue  Neurological - No headaches, No loss of strength  Musculoskeletal - No joint pain, No joint swelling, No muscle pain    VITALS  T(C): 36.5 (09-09-22 @ 21:50), Max: 36.5 (09-09-22 @ 21:50)  HR: 62 (09-10-22 @ 05:46) (62 - 65)  BP: 143/57 (09-10-22 @ 05:46) (143/57 - 165/71)  RR: 16 (09-09-22 @ 21:50) (16 - 16)  SpO2: 98% (09-09-22 @ 21:50) (98% - 98%)  Wt(kg): --       MEDICATIONS   acetaminophen     Tablet .. 650 milliGRAM(s) every 6 hours PRN  amLODIPine   Tablet 10 milliGRAM(s) daily  artificial  tears Solution 2 Drop(s) every 6 hours  brivaracetam 50 milliGRAM(s) two times a day  carvedilol 12.5 milliGRAM(s) every 12 hours  chlorhexidine 2% Cloths 1 Application(s) <User Schedule>  dextrose 5%. 1000 milliLiter(s) <Continuous>  dextrose 5%. 1000 milliLiter(s) <Continuous>  dextrose 50% Injectable 25 Gram(s) once  dextrose 50% Injectable 12.5 Gram(s) once  dextrose 50% Injectable 25 Gram(s) once  dextrose Oral Gel 15 Gram(s) once PRN  doxazosin 4 milliGRAM(s) <User Schedule>  epoetin cortney-epbx (RETACRIT) Injectable 02514 Unit(s) <User Schedule>  finasteride 5 milliGRAM(s) daily  furosemide    Tablet 80 milliGRAM(s) daily  glucagon  Injectable 1 milliGRAM(s) once  insulin glargine Injectable (LANTUS) 10 Unit(s) at bedtime  insulin lispro (ADMELOG) corrective regimen sliding scale   three times a day before meals  insulin lispro (ADMELOG) corrective regimen sliding scale   at bedtime  insulin lispro Injectable (ADMELOG) 4 Unit(s) three times a day before meals  levothyroxine 50 MICROGram(s) daily  melatonin 6 milliGRAM(s) at bedtime  multivitamin 1 Tablet(s) daily  mycophenolate mofetil 500 milliGRAM(s) <User Schedule>  nystatin    Suspension 901321 Unit(s) four times a day  pantoprazole    Tablet 40 milliGRAM(s) <User Schedule>  phenytoin   Capsule 200 milliGRAM(s) two times a day  polyethylene glycol 3350 17 Gram(s) daily  predniSONE   Tablet 10 milliGRAM(s) daily  senna 2 Tablet(s) at bedtime  sirolimus 7 milliGRAM(s) <User Schedule>  trimethoprim   80 mG/sulfamethoxazole 400 mG 1 Tablet(s) daily  valGANciclovir 450 milliGRAM(s) <User Schedule>      RECENT LABS/IMAGING                        8.4    6.41  )-----------( 423      ( 09 Sep 2022 06:30 )             26.3     09-09    130<L>  |  90<L>  |  84<H>  ----------------------------<  156<H>  4.6   |  23  |  5.12<H>    Ca    8.6      09 Sep 2022 06:30    TPro  6.7  /  Alb  1.9<L>  /  TBili  0.4  /  DBili  x   /  AST  25  /  ALT  30  /  AlkPhos  515<H>  09-09    PT/INR - ( 10 Sep 2022 06:00 )   PT: 37.1 sec;   INR: 3.16 ratio                     POCT Blood Glucose.: 141 mg/dL (09-10-22 @ 08:12)  POCT Blood Glucose.: 256 mg/dL (09-09-22 @ 21:49)  POCT Blood Glucose.: 345 mg/dL (09-09-22 @ 17:30)  POCT Blood Glucose.: 206 mg/dL (09-09-22 @ 12:22)  POCT Blood Glucose.: 248 mg/dL (09-09-22 @ 09:26)    ---------  PHYSICAL EXAM  Constitutional - NAD, Comfortable, in bed   Pulm - Breathing comfortably  Abd - Soft, NTND  Extremities - No edema, No calf tenderness  Neurologic Exam -      follows commands              Cognitive - Awake, Alert, oriented x 3, delayed processing      Communication - Fluent     Motor - 3/5     Sensory - Intact to LT  Psychiatric - Mood WNL, Affect WNL    ASSESSMENT/PLAN  66y Male PMHx of DM type II, HTN, CAD s/p stents/ CABG (2020), AFib on Eliquis, CVA (2019) d/t Afib, Seizure with functional deficits due to debility   seizures, brivaracetam, phenytoin  esrd on HD   afib, right IJ thrombus on coumadin   stage 2 at sacrum   Continue current medical management  Continue 3hrs a day of comprehensive rehab program.

## 2022-09-10 NOTE — PROGRESS NOTE ADULT - SUBJECTIVE AND OBJECTIVE BOX
Patient is a 66y old  Male who presents with a chief complaint of Debility (10 Sep 2022 09:01)    Patient seen in follow up for       PAST MEDICAL HISTORY:  Hypertension    Diabetes    Dyslipidemia    CAD (Coronary Artery Disease)    CAD (Coronary Artery Disease)    CRI (Chronic Renal Insufficiency)    Hypothyroidism    CVA (cerebral vascular accident)    Anemia    PEG (percutaneous endoscopic gastrostomy) status    Intubation of airway performed without difficulty    ESRD on dialysis    Seizures      MEDICATIONS  (STANDING):  amLODIPine   Tablet 10 milliGRAM(s) Oral daily  artificial  tears Solution 2 Drop(s) Both EYES every 6 hours  brivaracetam 50 milliGRAM(s) Oral two times a day  carvedilol 12.5 milliGRAM(s) Oral every 12 hours  chlorhexidine 2% Cloths 1 Application(s) Topical <User Schedule>  dextrose 5%. 1000 milliLiter(s) (100 mL/Hr) IV Continuous <Continuous>  dextrose 5%. 1000 milliLiter(s) (50 mL/Hr) IV Continuous <Continuous>  dextrose 50% Injectable 25 Gram(s) IV Push once  dextrose 50% Injectable 12.5 Gram(s) IV Push once  dextrose 50% Injectable 25 Gram(s) IV Push once  doxazosin 4 milliGRAM(s) Oral <User Schedule>  epoetin cortney-epbx (RETACRIT) Injectable 09497 Unit(s) IV Push <User Schedule>  finasteride 5 milliGRAM(s) Oral daily  furosemide    Tablet 80 milliGRAM(s) Oral daily  glucagon  Injectable 1 milliGRAM(s) IntraMuscular once  insulin glargine Injectable (LANTUS) 10 Unit(s) SubCutaneous at bedtime  insulin lispro (ADMELOG) corrective regimen sliding scale   SubCutaneous three times a day before meals  insulin lispro (ADMELOG) corrective regimen sliding scale   SubCutaneous at bedtime  insulin lispro Injectable (ADMELOG) 4 Unit(s) SubCutaneous three times a day before meals  levothyroxine 50 MICROGram(s) Oral daily  melatonin 6 milliGRAM(s) Oral at bedtime  multivitamin 1 Tablet(s) Oral daily  mycophenolate mofetil 500 milliGRAM(s) Oral <User Schedule>  nystatin    Suspension 962505 Unit(s) Oral four times a day  pantoprazole    Tablet 40 milliGRAM(s) Oral <User Schedule>  phenytoin   Capsule 200 milliGRAM(s) Oral two times a day  polyethylene glycol 3350 17 Gram(s) Oral daily  predniSONE   Tablet 10 milliGRAM(s) Oral daily  senna 2 Tablet(s) Oral at bedtime  sirolimus 7 milliGRAM(s) Oral <User Schedule>  trimethoprim   80 mG/sulfamethoxazole 400 mG 1 Tablet(s) Oral daily  valGANciclovir 450 milliGRAM(s) Oral <User Schedule>    MEDICATIONS  (PRN):  acetaminophen     Tablet .. 650 milliGRAM(s) Oral every 6 hours PRN Temp greater or equal to 38C (100.4F), Mild Pain (1 - 3)  dextrose Oral Gel 15 Gram(s) Oral once PRN Blood Glucose LESS THAN 70 milliGRAM(s)/deciliter    T(C): 36.7 (09-10-22 @ 09:26), Max: 36.7 (09-10-22 @ 09:26)  HR: 57 (09-10-22 @ 09:26) (57 - 65)  BP: 148/57 (09-10-22 @ 09:26) (143/57 - 165/71)  RR: 16 (09-10-22 @ 09:26) (16 - 16)  SpO2: 97% (09-10-22 @ 09:26) (94% - 98%)  Wt(kg): --  I&O's Detail      PHYSICAL EXAM:  General: No distress  Respiratory: b/l air entry  Cardiovascular: S1 S2  Gastrointestinal: soft  Extremities:  edema                              9.6    6.46  )-----------( 400      ( 10 Sep 2022 12:17 )             28.8     09-10    128<L>  |  89<L>  |  113<H>  ----------------------------<  70  4.7   |  22  |  6.42<H>    Ca    8.7      10 Sep 2022 12:17  Phos  7.4     09-10    TPro  6.7  /  Alb  1.9<L>  /  TBili  0.4  /  DBili  x   /  AST  25  /  ALT  30  /  AlkPhos  515<H>  09-09        LIVER FUNCTIONS - ( 09 Sep 2022 06:30 )  Alb: 1.9 g/dL / Pro: 6.7 g/dL / ALK PHOS: 515 U/L / ALT: 30 U/L / AST: 25 U/L / GGT: x               Sodium, Serum: 128 (09-10 @ 12:17)  Sodium, Serum: 130 (09-09 @ 06:30)  Sodium, Serum: 131 (09-08 @ 06:02)  Sodium, Serum: 132 (09-07 @ 06:32)    Creatinine, Serum: 6.42 (09-10 @ 12:17)  Creatinine, Serum: 5.12 (09-09 @ 06:30)  Creatinine, Serum: 3.56 (09-08 @ 06:02)  Creatinine, Serum: 5.03 (09-07 @ 06:32)    Potassium, Serum: 4.7 (09-10 @ 12:17)  Potassium, Serum: 4.6 (09-09 @ 06:30)  Potassium, Serum: 4.0 (09-08 @ 06:02)  Potassium, Serum: 4.6 (09-07 @ 06:32)    Hemoglobin: 9.6 (09-10 @ 12:17)  Hemoglobin: 8.4 (09-09 @ 06:30)  Hemoglobin: 8.1 (09-08 @ 06:02)  Hemoglobin: 8.0 (09-07 @ 06:30)

## 2022-09-11 NOTE — PROGRESS NOTE ADULT - ASSESSMENT
66 yr old Male with PMHx of DM 2, HTN, CAD s/p stents/ CABG (2020), AFib on Eliquis, CVA (2019) d/t Afib, Seizure following CVA, last episode was on 4/8/22, h/o GIB (2/2022) EGD with duodenal ulcer.  ESRD due to DM was on HD since 2019, s/p premacath removal 5/10/22 s/p Right DDRT (4/21/22) and placed on belatacept.  Frequent hospitalization d/t weakness, refractory sz, and sepsis.  Admitted and intubated 6/10, found to large pleural effusion s/p thoracentesis ~1.3 and VATS of thoracic 2.2 L.     # Sepsis  - recurrent UTI treated with abx; bactrim one tab daily  - pleural effusion s/p thoracentesis 1.3L   - hemothorax s/p VATS 2.2 L  - BCx (8/14) negative    #RLQ cellulitis   - complete course of keflex 8/20 - 9/4  - MONITOR    #Seizure  - on Phenytoin  - Noted on EEG, +Brivaracetam (8/16)  - Additional Brivaracetam 50mg post each HD session  - seizure precaution    #Acute DVT to R IJ thrombosis, Superficial vein thrombus to right basilic and cephalic vein  - Dosing coumadin for goal INR 2-3  - HOLD coumadin tonight...noted INR 3.24  - Check INR in AM    #ESRD was on HD til 4/29/2022 s/p DDRT  - restarted on HD (7/29)  - 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  Belatacept d/t 8/1 and was started on Sirolimus 7mg QD  - Prednisone 10  - Cellcept 250 BID on hold  - S/p perma cath placement 8/30/2022  - PPx medications: Nystatin, Bactrim, Valcyte (MWF)  - Nephrology consulT    #Anemia of chronic disease  - S/P  6U PRBC and 2 U FFP during acute hospital   - monitor H/H    #HTN  #Afib  - Eliquis switched to Coumadin  - INR NOTED, 3.24 today  - Hold coumadin tonight, check INR in AM  - Coreg     #Diabetes  - A1C  - accuchecks noted  - HOME MED Lantus AND NOVOLOG PREMEAL TID  - WILL CHANGE LANTUS TO 10 U HS and premeal 4 u tid   - FS + ISS    #Hypothyroidism  - Synthroid    #Pain control  - Tylenol PRN    #sacral DU-  - manage per wound care and primary team    #DVT :  Coumadin being held tonight  Will check INR in AM

## 2022-09-11 NOTE — PROGRESS NOTE ADULT - SUBJECTIVE AND OBJECTIVE BOX
No overnight events.  hypoglycemic this morning     REVIEW OF SYSTEMS  Constitutional - No fever,  No fatigue  Neurological - No headaches, No loss of strength  Musculoskeletal - No joint pain, No joint swelling, No muscle pain    VITALS  T(C): 36.6 (09-10-22 @ 21:09), Max: 36.8 (09-10-22 @ 17:15)  HR: 66 (09-11-22 @ 05:45) (56 - 66)  BP: 163/56 (09-11-22 @ 05:45) (130/65 - 163/56)  RR: 16 (09-10-22 @ 21:09) (16 - 16)  SpO2: 96% (09-10-22 @ 21:09) (96% - 100%)  Wt(kg): --       MEDICATIONS   acetaminophen     Tablet .. 650 milliGRAM(s) every 6 hours PRN  amLODIPine   Tablet 10 milliGRAM(s) daily  artificial  tears Solution 2 Drop(s) every 6 hours  brivaracetam 50 milliGRAM(s) two times a day  carvedilol 12.5 milliGRAM(s) every 12 hours  chlorhexidine 2% Cloths 1 Application(s) <User Schedule>  dextrose 5%. 1000 milliLiter(s) <Continuous>  dextrose 5%. 1000 milliLiter(s) <Continuous>  dextrose 50% Injectable 25 Gram(s) once  dextrose 50% Injectable 12.5 Gram(s) once  dextrose 50% Injectable 25 Gram(s) once  dextrose Oral Gel 15 Gram(s) once PRN  doxazosin 4 milliGRAM(s) <User Schedule>  epoetin cortney-epbx (RETACRIT) Injectable 81258 Unit(s) <User Schedule>  finasteride 5 milliGRAM(s) daily  furosemide    Tablet 80 milliGRAM(s) daily  glucagon  Injectable 1 milliGRAM(s) once  insulin glargine Injectable (LANTUS) 10 Unit(s) at bedtime  insulin lispro (ADMELOG) corrective regimen sliding scale   three times a day before meals  insulin lispro (ADMELOG) corrective regimen sliding scale   at bedtime  insulin lispro Injectable (ADMELOG) 4 Unit(s) three times a day before meals  levothyroxine 50 MICROGram(s) daily  melatonin 6 milliGRAM(s) at bedtime  multivitamin 1 Tablet(s) daily  mycophenolate mofetil 500 milliGRAM(s) <User Schedule>  nystatin    Suspension 472667 Unit(s) four times a day  pantoprazole    Tablet 40 milliGRAM(s) <User Schedule>  phenytoin   Capsule 200 milliGRAM(s) two times a day  polyethylene glycol 3350 17 Gram(s) daily  predniSONE   Tablet 10 milliGRAM(s) daily  senna 2 Tablet(s) at bedtime  sirolimus 7 milliGRAM(s) <User Schedule>  trimethoprim   80 mG/sulfamethoxazole 400 mG 1 Tablet(s) daily  valGANciclovir 450 milliGRAM(s) <User Schedule>      RECENT LABS/IMAGING                        9.6    6.46  )-----------( 400      ( 10 Sep 2022 12:17 )             28.8     09-10    128<L>  |  89<L>  |  113<H>  ----------------------------<  70  4.7   |  22  |  6.42<H>    Ca    8.7      10 Sep 2022 12:17  Phos  7.4     09-10      PT/INR - ( 11 Sep 2022 05:30 )   PT: 38.0 sec;   INR: 3.24 ratio         PTT - ( 11 Sep 2022 05:30 )  PTT:43.8 sec            POCT Blood Glucose.: 80 mg/dL (09-11-22 @ 09:13)  POCT Blood Glucose.: 61 mg/dL (09-11-22 @ 08:53)  POCT Blood Glucose.: 51 mg/dL (09-11-22 @ 08:32)  POCT Blood Glucose.: 52 mg/dL (09-11-22 @ 08:30)  POCT Blood Glucose.: 152 mg/dL (09-10-22 @ 21:13)  POCT Blood Glucose.: 151 mg/dL (09-10-22 @ 17:39)  POCT Blood Glucose.: 70 mg/dL (09-10-22 @ 12:06)    -------  PHYSICAL EXAM  Constitutional - NAD, Comfortable, in bed   Pulm - Breathing comfortably  Abd - Soft, NTND  Extremities - No edema, No calf tenderness  Neurologic Exam -      follows commands              Cognitive - Awake, Alert, oriented x 3, delayed processing      Communication - Fluent     Motor - 3/5     Sensory - Intact to LT  Psychiatric - Mood WNL, Affect WNL    ASSESSMENT/PLAN  66y Male PMHx of DM type II, HTN, CAD s/p stents/ CABG (2020), AFib on Eliquis, CVA (2019) d/t Afib, Seizure with functional deficits due to debility   seizures, brivaracetam, phenytoin  esrd on HD   afib, right IJ thrombus on coumadin   stage 2 at sacrum   Continue current medical management  monitor blood glucose   Continue 3hrs a day of comprehensive rehab program.

## 2022-09-11 NOTE — PROGRESS NOTE ADULT - SUBJECTIVE AND OBJECTIVE BOX
Patient is a 66y old  Male who presents with a chief complaint of Debility (09 Sep 2022 13:40)    No events overnight  had a BM this AM  Denies chest pain, SOB  Patient seen and examined at bedside.    ALLERGIES:  No Known Allergies    MEDICATIONS  (STANDING):  amLODIPine   Tablet 10 milliGRAM(s) Oral daily  artificial  tears Solution 2 Drop(s) Both EYES every 6 hours  brivaracetam 50 milliGRAM(s) Oral two times a day  carvedilol 12.5 milliGRAM(s) Oral every 12 hours  chlorhexidine 2% Cloths 1 Application(s) Topical <User Schedule>  dextrose 5%. 1000 milliLiter(s) (50 mL/Hr) IV Continuous <Continuous>  dextrose 5%. 1000 milliLiter(s) (100 mL/Hr) IV Continuous <Continuous>  dextrose 50% Injectable 25 Gram(s) IV Push once  dextrose 50% Injectable 12.5 Gram(s) IV Push once  dextrose 50% Injectable 25 Gram(s) IV Push once  doxazosin 4 milliGRAM(s) Oral <User Schedule>  epoetin cortney-epbx (RETACRIT) Injectable 56153 Unit(s) IV Push <User Schedule>  finasteride 5 milliGRAM(s) Oral daily  furosemide    Tablet 80 milliGRAM(s) Oral daily  glucagon  Injectable 1 milliGRAM(s) IntraMuscular once  insulin glargine Injectable (LANTUS) 10 Unit(s) SubCutaneous at bedtime  insulin lispro (ADMELOG) corrective regimen sliding scale   SubCutaneous three times a day before meals  insulin lispro (ADMELOG) corrective regimen sliding scale   SubCutaneous at bedtime  insulin lispro Injectable (ADMELOG) 4 Unit(s) SubCutaneous three times a day before meals  levothyroxine 50 MICROGram(s) Oral daily  melatonin 6 milliGRAM(s) Oral at bedtime  multivitamin 1 Tablet(s) Oral daily  mycophenolate mofetil 500 milliGRAM(s) Oral <User Schedule>  nystatin    Suspension 425549 Unit(s) Oral four times a day  pantoprazole    Tablet 40 milliGRAM(s) Oral <User Schedule>  phenytoin   Capsule 200 milliGRAM(s) Oral two times a day  polyethylene glycol 3350 17 Gram(s) Oral daily  predniSONE   Tablet 10 milliGRAM(s) Oral daily  senna 2 Tablet(s) Oral at bedtime  sirolimus 7 milliGRAM(s) Oral <User Schedule>  trimethoprim   80 mG/sulfamethoxazole 400 mG 1 Tablet(s) Oral daily  valGANciclovir 450 milliGRAM(s) Oral <User Schedule>    MEDICATIONS  (PRN):  acetaminophen     Tablet .. 650 milliGRAM(s) Oral every 6 hours PRN Temp greater or equal to 38C (100.4F), Mild Pain (1 - 3)  dextrose Oral Gel 15 Gram(s) Oral once PRN Blood Glucose LESS THAN 70 milliGRAM(s)/deciliter    Vital Signs Last 24 Hrs  T(C): 36.6 (10 Sep 2022 21:09), Max: 36.8 (10 Sep 2022 17:15)  T(F): 97.9 (10 Sep 2022 21:09), Max: 98.3 (10 Sep 2022 17:15)  HR: 66 (11 Sep 2022 05:45) (56 - 66)  BP: 163/56 (11 Sep 2022 05:45) (130/65 - 163/56)  RR: 16 (10 Sep 2022 21:09) (16 - 16)  SpO2: 96% (10 Sep 2022 21:09) (96% - 100%)    Parameters below as of 10 Sep 2022 21:09  Patient On (Oxygen Delivery Method): room air    PHYSICAL EXAM:  GENERAL- NAD, weak appearing  EAR/NOSE/MOUTH/THROAT - no pharyngeal exudates, no oral leisions,  MMM  EYES- PEDRO, conjunctiva and Sclera clear  NECK- supple  RESPIRATORY-  clear to auscultation bilaterally, non laboured breathing  CARDIOVASCULAR - SIS2, RRR  GI - soft NT BS present, abdominal wall edema right flank and RLQ.   EXTREMITIES- no pedal edema  NEUROLOGY- mild LUE weakness compared to RUE  SKIN- sacral DU  stage 2   PSYCHIATRY- AAO X 3  MUSCULOSKELETAL- AROM normal    LABS:                        8.4    6.41  )-----------( 423      ( 09 Sep 2022 06:30 )             26.3       09-09    130  |  90  |  84  ----------------------------<  156  4.6   |  23  |  5.12    Ca    8.6      09 Sep 2022 06:30  Phos  4.8     09-08  Mg     2.0     09-08    TPro  6.7  /  Alb  1.9  /  TBili  0.4  /  DBili  x   /  AST  25  /  ALT  30  /  AlkPhos  515  09-09     PT/INR - ( 09 Sep 2022 06:30 )   PT: 33.1 sec;   INR: 2.82 ratio    PTT - ( 08 Sep 2022 06:02 )  PTT:40.8 sec     07-27 Chol -- LDL -- HDL -- Trig 118 mg/dL    POCT Blood Glucose.: 256 mg/dL (09 Sep 2022 21:49)  POCT Blood Glucose.: 345 mg/dL (09 Sep 2022 17:30)  POCT Blood Glucose.: 206 mg/dL (09 Sep 2022 12:22)  POCT Blood Glucose.: 248 mg/dL (09 Sep 2022 09:26)  POCT Blood Glucose.: 183 mg/dL (09 Sep 2022 07:55)    COVID-19 PCR: NotDetec (09-09-22 @ 07:43)  COVID-19 PCR: NotDetec (09-07-22 @ 18:29)  COVID-19 PCR: NotDetec (09-01-22 @ 07:05)  COVID-19 PCR: NotDetec (08-29-22 @ 11:09)  COVID-19 PCR: NotDetec (08-25-22 @ 18:36)  COVID-19 PCR: NotDetec (09-09-22 @ 07:43)  COVID-19 PCR: NotDetec (09-07-22 @ 18:29)  COVID-19 PCR: NotDetec (09-01-22 @ 07:05)  COVID-19 PCR: NotDetec (08-29-22 @ 11:09)  COVID-19 PCR: NotDetec (08-25-22 @ 18:36)  COVID-19 PCR: NotDetec (08-22-22 @ 06:42)  COVID-19 PCR: NotDetec (08-15-22 @ 17:07)  COVID-19 PCR: NotDetec (08-07-22 @ 18:05)  COVID-19 PCR: NotDetec (07-25-22 @ 11:51)  COVID-19 PCR: NotDetec (07-23-22 @ 07:55)        Care Discussed with Consultants/Other Providers:   1.Bactrim and valganciclovir renally dosed for prophylaxis  2. Will defer to transplant team on frequency of CMV serum viral loads they desire  3. Baseline strongyloides serology-did not see in sunrise  4.Additional ID w/u pending course  5.Thanks, will follow from ID viewpoint

## 2022-09-12 NOTE — PROGRESS NOTE ADULT - SUBJECTIVE AND OBJECTIVE BOX
Patient is a 66y old  Male who presents with a chief complaint of Debility (11 Sep 2022 09:59)    HPI:  66 yr old Male with PMHx of DM type II, HTN, CAD s/p stents/ CABG (), AFib on Eliquis, CVA () d/t Afib, Seizure d/o following CVA, last episode was on 22, h/o GIB (2022) EGD with duodenal ulcer.  ESRD due to DM was on HD since , s/p premacath removal 5/10/22 s/p Right DDRT (22) and placed on belatacept.  Recent hospitalization 22 -5/10/22 with weakness/anemia found to have perinephric hematoma requiring evacuation and repair of bleeding arterial anastomosis. Was discharged to [facility], currently being treated for UTI with Levaquin, developed multiple sz episodes refractory to 5 mg versed IM (EMS) and 2 mg ativan IV in E.D. concerning for status epilepticus requiring intubation for hypoxic respiratory failure. MICU Consult was called for hypoxic respiratory failure secondary to status epilepticus. Was recently dx with klebsiella UTI - started with ertapenem which was then switched to Levaquin. He also had Parainfluenza PNA.      Patient readmitted 6/10 ED VS temp 97.8 Axillary, HR 61 RR26 100% on NRB mask. Per ED noted the patient was noted to be gurgling and was unable to protect airway, and was intubated.  EEG without Sz activity.  patient was extubated and had thoracentesis (1.3L)  for right pleural effusion - post op he needed 5U PRBC and 2 U FFP.  Was reintubated  - CT found hemothorax ~1.6 L.  S/p VATS with thoracic 2.2 L old blood removed and 2nd chest tube placed (removed ).  R IJ DVT () - heparin switched to lovenox.  Noted with refractory Sx and AMS debility and placed on tube feed (7/10).    Hospital course complicated by sepsis - UTI treated with Ertapenem.  Despite abx, he had intermittent fevers.  s/p Urethral stent (). Restarted HD () + 1 U PRBC.  Immunosuppressive drug switched to Sirolimus.  Noted again with refractory seizures on  - EEG with 1 seizure, Brivaracetam started ().  AC switched from eliquis to coumadin (d/t 40% less efficacy while on phenytoin). (08 Sep 2022 13:35)      PAST MEDICAL & SURGICAL HISTORY:  Hypertension  Diabetes  Dyslipidemia  CAD (Coronary Artery Disease)  with Stents in 2009 and 2019, s/p off-pump C3L on 20  Hypothyroidism  CVA (cerebral vascular accident)  19 with residual bilateral weakness  Anemia  PEG (percutaneous endoscopic gastrostomy) status  removed 2020  Intubation of airway performed without difficulty  Dec 2019  CVA c/b status epilepticus requiring intubation and PEG in 2019-  ESRD on dialysis  M/W/F  Seizures  after CVA, last seizure was last week 22  History of insertion of stent into coronary artery bypass graft   and   S/P CABG x 3  off pump C3L on 20    SUBJECTIVE/ROS:  Patient seen and evaluated while resting in bed - now with 2L NC on. Denies SOB, cough, CP, or palpitation.  Mild fatigue but overall feels well.    Encourage and reviewed deep breathing exercises  Appetite is good, no nausea or abd pain.  LBM last night () - loose BM x 3.    Denies dysuria or frequency      PHYSICAL EXAM  66y  Vital Signs Last 24 Hrs  T(C): 36.5 (12 Sep 2022 07:41), Max: 36.9 (11 Sep 2022 19:40)  T(F): 97.7 (12 Sep 2022 07:41), Max: 98.5 (11 Sep 2022 19:40)  HR: 61 (12 Sep 2022 07:41) (61 - 69)  BP: 123/61 (12 Sep 2022 07:41) (123/61 - 168/75)  RR: 16 (12 Sep 2022 07:42) (16 - 16)  SpO2: 99% (12 Sep 2022 07:42) (92% - 99%) on 2L nasal cannula  Daily -  Admission weight () 69.8 kg  Daily Weight in k.7 (12 Sep 2022 05:35)    Gen - NAD on 2L NC  HEENT - NCAT, EOMI, MMM  Neck - Supple, No limited ROM  Pulm - R>L diminished base  Cardiovascular - RRR  Chest - left chest wall permcath  Abdomen -  semi tympanic, NT, +BS, +lateral RLQ discoloration  Extremities - No Cyanosis, no clubbing, minimal edema to b/l ankles, no calf tenderness  Neuro-     Cognitive - awake, alert, oriented to person and place. Delayed processing/response     Communication - Fluent     Cranial Nerves - CN 2-12 intact. No facial asymmetry, Tongue midline, EOMI, Shoulder shrug intact     Motor -                     LEFT    UE - 3+5                    RIGHT UE - 3-/5                    LEFT    LE - 3+/5                    RIGHT LE - 3-/5        Sensory - Intact to LT bilaterally     Coordination - FTN  impaired to right side  MSK: generalized weakness  Skin:  stage 2 pressure ulcer to sacrum 0.3 x 0.2,  discoloration to lateral RLQ of abdomen      RECENT LABS:                        9.6    6.46  )-----------( 400      ( 10 Sep 2022 12:17 )             28.8     09-10    128<L>  |  89<L>  |  113<H>  ----------------------------<  70  4.7   |  22  |  6.42<H>    Ca    8.7      10 Sep 2022 12:17  Phos  7.4     09-10    PT/INR - ( 12 Sep 2022 06:50 )   PT: 26.9 sec;   INR: 2.30 ratio      CAPILLARY BLOOD GLUCOSE  POCT Blood Glucose.: 228 mg/dL (12 Sep 2022 07:28)  POCT Blood Glucose.: 253 mg/dL (12 Sep 2022 04:42)  POCT Blood Glucose.: 122 mg/dL (11 Sep 2022 21:54)  POCT Blood Glucose.: 202 mg/dL (11 Sep 2022 16:59)  POCT Blood Glucose.: 208 mg/dL (11 Sep 2022 16:36)  POCT Blood Glucose.: 242 mg/dL (11 Sep 2022 12:09)  POCT Blood Glucose.: 140 mg/dL (11 Sep 2022 10:11)    CXR done     Allergies  No Known Allergies    MEDICATIONS  (STANDING):  amLODIPine   Tablet 10 milliGRAM(s) Oral daily  artificial  tears Solution 2 Drop(s) Both EYES every 6 hours  brivaracetam 50 milliGRAM(s) Oral two times a day  carvedilol 12.5 milliGRAM(s) Oral every 12 hours  chlorhexidine 2% Cloths 1 Application(s) Topical <User Schedule>  dextrose 5%. 1000 milliLiter(s) (100 mL/Hr) IV Continuous <Continuous>  dextrose 5%. 1000 milliLiter(s) (50 mL/Hr) IV Continuous <Continuous>  dextrose 50% Injectable 25 Gram(s) IV Push once  dextrose 50% Injectable 12.5 Gram(s) IV Push once  dextrose 50% Injectable 25 Gram(s) IV Push once  doxazosin 4 milliGRAM(s) Oral <User Schedule>  epoetin cortney-epbx (RETACRIT) Injectable 57547 Unit(s) IV Push <User Schedule>  epoetin cortney-epbx (RETACRIT) Injectable 90298 Unit(s) IV Push once  finasteride 5 milliGRAM(s) Oral daily  furosemide    Tablet 80 milliGRAM(s) Oral daily  glucagon  Injectable 1 milliGRAM(s) IntraMuscular once  insulin glargine Injectable (LANTUS) 10 Unit(s) SubCutaneous at bedtime  insulin lispro (ADMELOG) corrective regimen sliding scale   SubCutaneous three times a day before meals  insulin lispro (ADMELOG) corrective regimen sliding scale   SubCutaneous at bedtime  insulin lispro Injectable (ADMELOG) 4 Unit(s) SubCutaneous three times a day before meals  levothyroxine 50 MICROGram(s) Oral daily  melatonin 6 milliGRAM(s) Oral at bedtime  multivitamin 1 Tablet(s) Oral daily  mycophenolate mofetil 500 milliGRAM(s) Oral <User Schedule>  nystatin    Suspension 824449 Unit(s) Oral four times a day  pantoprazole    Tablet 40 milliGRAM(s) Oral <User Schedule>  phenytoin   Capsule 200 milliGRAM(s) Oral two times a day  predniSONE   Tablet 10 milliGRAM(s) Oral daily  sirolimus 7 milliGRAM(s) Oral <User Schedule>  trimethoprim   80 mG/sulfamethoxazole 400 mG 1 Tablet(s) Oral daily  valGANciclovir 450 milliGRAM(s) Oral <User Schedule>  warfarin 5 milliGRAM(s) Oral once    MEDICATIONS  (PRN):  acetaminophen     Tablet .. 650 milliGRAM(s) Oral every 6 hours PRN Temp greater or equal to 38C (100.4F), Mild Pain (1 - 3)  dextrose Oral Gel 15 Gram(s) Oral once PRN Blood Glucose LESS THAN 70 milliGRAM(s)/deciliter  polyethylene glycol 3350 17 Gram(s) Oral daily PRN Constipation   SUBJECTIVE/ROS:  Patient seen and evaluated while resting in bed - now with 2L NC on.  Encourage and reviewed deep breathing exercises  Appetite is good, no nausea or abd pain.  LBM last night () - loose BM x 3.    Denies dysuria or frequency    ROS--No head ache or chest pain  Tired, on NC oxy   Denies SOB, cough, CP, or palpitation.  Mild fatigue but overall feels well.        Vital Signs Last 24 Hrs  T(C): 36.5 (12 Sep 2022 07:41), Max: 36.9 (11 Sep 2022 19:40)  T(F): 97.7 (12 Sep 2022 07:41), Max: 98.5 (11 Sep 2022 19:40)  HR: 61 (12 Sep 2022 07:41) (61 - 69)  BP: 123/61 (12 Sep 2022 07:41) (123/61 - 168/75)  RR: 16 (12 Sep 2022 07:42) (16 - 16)  SpO2: 99% (12 Sep 2022 07:42) (92% - 99%) on 2L nasal cannula  Daily -  Admission weight () 69.8 kg  Daily Weight in k.7 (12 Sep 2022 05:35)    Gen - NAD on 2L NC  HEENT - NCAT, EOMI, MMM  Neck - Supple, No limited ROM  Pulm - R>L diminished base  Cardiovascular - RRR  Chest - left chest wall permcath  Abdomen -  semi tympanic, NT, +BS, +lateral RLQ discoloration  Extremities - No Cyanosis, no clubbing, minimal edema to b/l ankles, no calf tenderness  Neuro-     Cognitive - awake, alert, oriented to person and place. Delayed processing/response     Communication - Fluent     Cranial Nerves - CN 2-12 intact. No facial asymmetry, Tongue midline, EOMI, Shoulder shrug intact     Motor -                     LEFT    UE - 3+5                    RIGHT UE - 3-/5                    LEFT    LE - 3+/5                    RIGHT LE - 3-/5        Sensory - Intact to LT bilaterally     Coordination - FTN  impaired to right side  MSK: generalized weakness  Skin:  stage 2 pressure ulcer to sacrum 0.3 x 0.2,  discoloration to lateral RLQ of abdomen      RECENT LABS:                        9.6    6.46  )-----------( 400      ( 10 Sep 2022 12:17 )             28.8     0910    128<L>  |  89<L>  |  113<H>  ----------------------------<  70  4.7   |  22  |  6.42<H>    Ca    8.7      10 Sep 2022 12:17  Phos  7.4     09-10    PT/INR - ( 12 Sep 2022 06:50 )   PT: 26.9 sec;   INR: 2.30 ratio      CAPILLARY BLOOD GLUCOSE  POCT Blood Glucose.: 228 mg/dL (12 Sep 2022 07:28)  POCT Blood Glucose.: 253 mg/dL (12 Sep 2022 04:42)  POCT Blood Glucose.: 122 mg/dL (11 Sep 2022 21:54)  POCT Blood Glucose.: 202 mg/dL (11 Sep 2022 16:59)  POCT Blood Glucose.: 208 mg/dL (11 Sep 2022 16:36)  POCT Blood Glucose.: 242 mg/dL (11 Sep 2022 12:09)  POCT Blood Glucose.: 140 mg/dL (11 Sep 2022 10:11)    CXR done     Allergies  No Known Allergies    MEDICATIONS  (STANDING):  amLODIPine   Tablet 10 milliGRAM(s) Oral daily  artificial  tears Solution 2 Drop(s) Both EYES every 6 hours  brivaracetam 50 milliGRAM(s) Oral two times a day  carvedilol 12.5 milliGRAM(s) Oral every 12 hours  chlorhexidine 2% Cloths 1 Application(s) Topical <User Schedule>  dextrose 5%. 1000 milliLiter(s) (100 mL/Hr) IV Continuous <Continuous>  dextrose 5%. 1000 milliLiter(s) (50 mL/Hr) IV Continuous <Continuous>  dextrose 50% Injectable 25 Gram(s) IV Push once  dextrose 50% Injectable 12.5 Gram(s) IV Push once  dextrose 50% Injectable 25 Gram(s) IV Push once  doxazosin 4 milliGRAM(s) Oral <User Schedule>  epoetin cortney-epbx (RETACRIT) Injectable 26993 Unit(s) IV Push <User Schedule>  epoetin cortney-epbx (RETACRIT) Injectable 99298 Unit(s) IV Push once  finasteride 5 milliGRAM(s) Oral daily  furosemide    Tablet 80 milliGRAM(s) Oral daily  glucagon  Injectable 1 milliGRAM(s) IntraMuscular once  insulin glargine Injectable (LANTUS) 10 Unit(s) SubCutaneous at bedtime  insulin lispro (ADMELOG) corrective regimen sliding scale   SubCutaneous three times a day before meals  insulin lispro (ADMELOG) corrective regimen sliding scale   SubCutaneous at bedtime  insulin lispro Injectable (ADMELOG) 4 Unit(s) SubCutaneous three times a day before meals  levothyroxine 50 MICROGram(s) Oral daily  melatonin 6 milliGRAM(s) Oral at bedtime  multivitamin 1 Tablet(s) Oral daily  mycophenolate mofetil 500 milliGRAM(s) Oral <User Schedule>  nystatin    Suspension 305204 Unit(s) Oral four times a day  pantoprazole    Tablet 40 milliGRAM(s) Oral <User Schedule>  phenytoin   Capsule 200 milliGRAM(s) Oral two times a day  predniSONE   Tablet 10 milliGRAM(s) Oral daily  sirolimus 7 milliGRAM(s) Oral <User Schedule>  trimethoprim   80 mG/sulfamethoxazole 400 mG 1 Tablet(s) Oral daily  valGANciclovir 450 milliGRAM(s) Oral <User Schedule>  warfarin 5 milliGRAM(s) Oral once    MEDICATIONS  (PRN):  acetaminophen     Tablet .. 650 milliGRAM(s) Oral every 6 hours PRN Temp greater or equal to 38C (100.4F), Mild Pain (1 - 3)  dextrose Oral Gel 15 Gram(s) Oral once PRN Blood Glucose LESS THAN 70 milliGRAM(s)/deciliter  polyethylene glycol 3350 17 Gram(s) Oral daily PRN Constipation

## 2022-09-12 NOTE — PROGRESS NOTE ADULT - ASSESSMENT
66 yr old Male with PMHx of DM type II, HTN, CAD s/p stents/ CABG (2020), AFib on Eliquis, CVA (2019) d/t Afib, Seizure d/o following CVA, last episode was on 4/8/22, h/o GIB (2/2022) EGD with duodenal ulcer.  ESRD due to DM was on HD since 2019, s/p premacath removal 5/10/22 s/p Right DDRT (4/21/22) and placed on belatacept.  Frequent hospitalization d/t weakness, refractory sz, and sepsis.  Admitted and intubated 6/10, found to large pleural effusion s/p thoracentesis ~1.3 and VATS of thoracic 2.2 L.      * INR 3.24 (9/11) - coumadin dose held, INR 2.30 (9/12) - dose for 5mg today  * CXR (9/9) f/u recent effusion - pending rad read  * ID consult and following appreciated  * transplant team PHONE  --Iker Alfred (MD) ??  Dr. Eduardo Barber (transplant surgeon)  * last HD Sat (9/10) - given resp status/increased O2 demand + diminished B/L base, will have renal assess and likely HD today.  Hold therapy for 9/12.  If patient resp status unchanged and abd still full post HD, will order for ABD and chest Xray.      COMORBIDITES/ACTIVE MEDICAL ISSUES   Gait Instability, ADL impairments and Functional impairments secondary to recurrent UTI, status epilepticus, with debility seizures. 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  Belatacept d/t 8/1 and was started on Sirolimus 7mg QD  - Prednisone 10  - Cellcept 250 BID on hold  - S/p perma cath placement 8/30/2022  - PPx medications: Nystatin, Bactrim, Valcyte (MWF)  - Nephrology following - T/Th/Sat    #Anemia of chronic disease  - Has gotten total of 6U PRBC and 2 U FFP during acute hospital   - monitor H/H    #HTN  #Afib  - Eliquis switched to Coumadin  - Coreg   - INR 2.30 (9/12) + Coumadin dose- 5mg for 9/12    #Diabetes  - A1C  - Lantus  - Admelog  - FS + ISS  - Hospitalist following     #Hypothyroidism  - Synthroid 37.5 mcg    #Pain control  - Tylenol PRN    #GI/Bowel Mgmt   - At risk for constipation due to neurologic diagnosis, immobility and/or medication use  - Senna - hold d/t loose BM,  Miralax PRN    #Bladder management  - PVR q 8 hours (SC if > 400)  - Encourage timed voids Q4hrs while awake for independence and to promote continence during therapy    #DVT   - acute DVT to R IJ thrombosis, Superficial vein thrombus to right basilic and cephalic vein  - Coumadin  - SCDs, TEDs     #Skin:  -Stage 2 pressure ulcer to sacrum 0.3 x 0.2- cleanse with NS, apply wet gauze andcover with allevyn foam dressing  -At risk for pressure injury due to neurologic diagnosis and relative immobility  -Bilateral heels - soft heel protectors, apply liquid barrier film daily and monitor for tissue type changes  - Turn Q2 hr while in bed, air mattress  - Skin barrier cream as needed  - Nursing to monitor skin Qshift    Diet  - Regular + Thins  [CCHO, DASH/TLC]    + Fluid Restriction: 1L    Precautions / PROPHYLAXIS:   - Falls, Cardiac, Seizure   - ortho: Weight bearing status: WBAT   - Lungs: Aspiration, Incentive Spirometer   - Pressure injury/Skin: Turn Q2hrs while in bed, OOB to Chair, PT/OT      Follow ups:  Andre Patterson (MD)  Clinical Neurophysiology; EEGEpilepsy; Neurology  [address] [place] [address], [address]  [place] [address]  Phone: [phone]  Fax: [phone]  Follow Up Time:     Padmini Lincoln ()  Internal Medicine  [address] [facility] [address]  [place] [address]  Phone: [phone]  Fax: [phone]  Follow Up Time:     Iker Alfred (MD)  Internal Medicine; Nephrology  [address] [place] [address]  Phone: [phone]  Fax: [phone]  Follow Up Time:    Dr. Eduardo Babrer - Organ Transplant    Iker Alfred  [facility]  [address]  Scheduled Appointment: 09/20/2022    Iker Alfred  [facility]  [address]  Scheduled Appointment: 10/20/2022     66 yr old Male with PMHx of DM type II, HTN, CAD s/p stents/ CABG (2020), AFib on Eliquis, CVA (2019) d/t Afib, Seizure d/o following CVA, last episode was on 4/8/22, h/o GIB (2/2022) EGD with duodenal ulcer.  ESRD due to DM was on HD since 2019, s/p premacath removal 5/10/22 s/p Right DDRT (4/21/22) and placed on belatacept.  Frequent hospitalization d/t weakness, refractory sz, and sepsis.  Admitted and intubated 6/10, found to large pleural effusion s/p thoracentesis ~1.3 and VATS of thoracic 2.2 L.      * INR 3.24 (9/11) - coumadin dose held, INR 2.30 (9/12) - dose for 5mg today  * CXR (9/9) f/u recent effusion - pending rad read  * ID consult and following appreciated  * transplant team PHONE  --Iker Alfred (MD) ??  Dr. Eduardo Barber (transplant surgeon) - Darleen Dior NP [phone]  * last HD Sat (9/10) - given resp status/increased O2 demand + diminished B/L base, will have renal assess and likely HD today.  Hold therapy for 9/12.  If patient resp status unchanged and abd still full post HD, will order for ABD and chest Xray.      COMORBIDITES/ACTIVE MEDICAL ISSUES   Gait Instability, ADL impairments and Functional impairments secondary to recurrent UTI, status epilepticus, with debility seizures. 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  Belatacept d/t 8/1 and was started on Sirolimus 7mg QD  - Prednisone 10  - Cellcept 250 BID on hold  - S/p perma cath placement 8/30/2022  - PPx medications: Nystatin, Bactrim, Valcyte (MWF)  - Nephrology following - T/Th/Sat    #Anemia of chronic disease  - Has gotten total of 6U PRBC and 2 U FFP during acute hospital   - monitor H/H    #HTN  #Afib  - Eliquis switched to Coumadin  - Coreg   - INR 2.30 (9/12) + Coumadin dose- 5mg for 9/12    #Diabetes  - A1C  - Lantus  - Admelog  - FS + ISS  - Hospitalist following     #Hypothyroidism  - Synthroid 37.5 mcg    #Pain control  - Tylenol PRN    #GI/Bowel Mgmt   - At risk for constipation due to neurologic diagnosis, immobility and/or medication use  - Senna - hold d/t loose BM,  Miralax PRN    #Bladder management  - PVR q 8 hours (SC if > 400)  - Encourage timed voids Q4hrs while awake for independence and to promote continence during therapy    #DVT   - acute DVT to R IJ thrombosis, Superficial vein thrombus to right basilic and cephalic vein  - Coumadin  - SCDs, TEDs     #Skin:  -Stage 2 pressure ulcer to sacrum 0.3 x 0.2- cleanse with NS, apply wet gauze andcover with allevyn foam dressing  -At risk for pressure injury due to neurologic diagnosis and relative immobility  -Bilateral heels - soft heel protectors, apply liquid barrier film daily and monitor for tissue type changes  - Turn Q2 hr while in bed, air mattress  - Skin barrier cream as needed  - Nursing to monitor skin Qshift    Diet  - Regular + Thins  [CCHO, DASH/TLC]    + Fluid Restriction: 1L    Precautions / PROPHYLAXIS:   - Falls, Cardiac, Seizure   - ortho: Weight bearing status: WBAT   - Lungs: Aspiration, Incentive Spirometer   - Pressure injury/Skin: Turn Q2hrs while in bed, OOB to Chair, PT/OT      IDT 9/12  SW: lives with spouse and 2 sons in  with 6 WINTER, 1st [place] setup.  PTA needed some assistance with ADLs  NSG: total A/incont  + stage 2 to sacrum  Functional status: max A with ADLs except mod A for stand-pivot with standby, transferring with bert (likely upgrade to jena winterpercy soon), decreased core strength.  SLP: currrently on easy to chew d/t dentures, has severe cog deficit, mild dysarthria, sequence ADLs with min cues  Goals: Close SV with ADLs, CG with functional transfer/amb/stair (but min-mod A more realistically)  Barriers: decreased safety, atrophy d/t deconditioning  TDD: 9/29 home    Follow ups:  Andre Patterson (MD)  Clinical Neurophysiology; EEGEpilepsy; Neurology  [address], [facility] [address]  [place] [address]  Phone: [phone]  Fax: [phone]  Follow Up Time:     Padmini Lincoln ()  Internal Medicine  [address], [address]  [place] [address]  Phone: [phone]  Fax: [phone]  Follow Up Time:     Iker Alfred (MD)  Internal Medicine; Nephrology  [address] [place] [address]  Phone: [phone]  Fax: [phone]  Follow Up Time:    Dr. Eduardo Barber - Organ Transplant    Iker Alfred  [facility]  NEPHRO [address] [place] [address] D  Scheduled Appointment: 09/20/2022    Iker Alfred  [facility]  NEPHRO [address] [place] [address] D  Scheduled Appointment: 10/20/2022

## 2022-09-12 NOTE — PROGRESS NOTE ADULT - SUBJECTIVE AND OBJECTIVE BOX
Low pulse ox noted this am, HD done this am    Vital Signs Last 24 Hrs  T(C): 36.7 (09-12-22 @ 20:13), Max: 36.7 (09-12-22 @ 20:13)  T(F): 98 (09-12-22 @ 20:13), Max: 98 (09-12-22 @ 20:13)  HR: 73 (09-12-22 @ 20:13) (61 - 73)  BP: 166/65 (09-12-22 @ 20:13) (123/61 - 166/69)  RR: 16 (09-12-22 @ 20:13) (16 - 16)  SpO2: 97% (09-12-22 @ 20:13) (92% - 100%)    I&O's Detail    12 Sep 2022 07:01  -  12 Sep 2022 20:54  --------------------------------------------------------  OUT:    Other (mL): 2000 mL  Total OUT: 2000 mL    s1s2  b/l air entry  soft, ND  no edema  alert                        8.0    5.78  )-----------( 391      ( 12 Sep 2022 06:50 )             25.7     12 Sep 2022 06:50    131    |  92     |  90     ----------------------------<  233    3.9     |  24     |  5.65     Ca    8.1        12 Sep 2022 06:50  Phos  6.8       12 Sep 2022 06:50    TPro  x      /  Alb  1.8    /  TBili  x      /  DBili  x      /  AST  x      /  ALT  x      /  AlkPhos  x      12 Sep 2022 06:50    LIVER FUNCTIONS - ( 12 Sep 2022 06:50 )  Alb: 1.8 g/dL / Pro: x     / ALK PHOS: x     / ALT: x     / AST: x     / GGT: x           PT/INR - ( 12 Sep 2022 06:50 )   PT: 26.9 sec;   INR: 2.30 ratio      acetaminophen     Tablet .. 650 milliGRAM(s) Oral every 6 hours PRN  amLODIPine   Tablet 10 milliGRAM(s) Oral daily  artificial  tears Solution 2 Drop(s) Both EYES every 6 hours  brivaracetam 50 milliGRAM(s) Oral two times a day  carvedilol 12.5 milliGRAM(s) Oral every 12 hours  chlorhexidine 2% Cloths 1 Application(s) Topical <User Schedule>  dextrose 5%. 1000 milliLiter(s) IV Continuous <Continuous>  dextrose 5%. 1000 milliLiter(s) IV Continuous <Continuous>  dextrose 50% Injectable 25 Gram(s) IV Push once  dextrose 50% Injectable 12.5 Gram(s) IV Push once  dextrose 50% Injectable 25 Gram(s) IV Push once  dextrose Oral Gel 15 Gram(s) Oral once PRN  doxazosin 4 milliGRAM(s) Oral <User Schedule>  epoetin cortney-epbx (RETACRIT) Injectable 59606 Unit(s) IV Push <User Schedule>  finasteride 5 milliGRAM(s) Oral daily  furosemide    Tablet 80 milliGRAM(s) Oral daily  glucagon  Injectable 1 milliGRAM(s) IntraMuscular once  insulin glargine Injectable (LANTUS) 10 Unit(s) SubCutaneous at bedtime  insulin lispro (ADMELOG) corrective regimen sliding scale   SubCutaneous three times a day before meals  insulin lispro (ADMELOG) corrective regimen sliding scale   SubCutaneous at bedtime  insulin lispro Injectable (ADMELOG) 4 Unit(s) SubCutaneous three times a day before meals  levothyroxine 50 MICROGram(s) Oral daily  melatonin 6 milliGRAM(s) Oral at bedtime  multivitamin 1 Tablet(s) Oral daily  mycophenolate mofetil 500 milliGRAM(s) Oral <User Schedule>  nystatin    Suspension 761176 Unit(s) Oral four times a day  pantoprazole    Tablet 40 milliGRAM(s) Oral <User Schedule>  phenytoin   Capsule 200 milliGRAM(s) Oral two times a day  polyethylene glycol 3350 17 Gram(s) Oral daily PRN  predniSONE   Tablet 10 milliGRAM(s) Oral daily  sirolimus 7 milliGRAM(s) Oral <User Schedule>  trimethoprim   80 mG/sulfamethoxazole 400 mG 1 Tablet(s) Oral daily  valGANciclovir 450 milliGRAM(s) Oral <User Schedule>  warfarin 5 milliGRAM(s) Oral once    A/P:    S/p complicated hospital course as per HPI  Now in [place]  S/p DDRT 4/21/22, required HD, came off HD since 5/5/22, back on HD since 7/27/22  Will continue transplant meds per transplant team recommendations  Pt w/some UO  Avoid nephrotoxins  Epoetin w/HD 3 x week  Renal diet  2kg removed w/HD today  Bld work w/HD   D/w family at bedside    [phone]

## 2022-09-13 NOTE — PROGRESS NOTE ADULT - SUBJECTIVE AND OBJECTIVE BOX
SUBJECTIVE/ROS:  Patient seen and evaluated while resting in bed - off O2.  States he slept and doesn't feel tired or fatigued  Denies CP, palpitation, SOB, or cough  Appetite is fine, no nausea or abd pain.  LBM yesterday ()   Denies dysuria or frequency  Was seen again later during PT - working on sit to stand    Vital Signs Last 24 Hrs  T(C): 36.7 (22 @ 08:29), Max: 36.7 (22 @ 20:13)  T(F): 98 (22 @ 08:29), Max: 98 (22 @ 20:13)  HR: 60 (22 @ 08:29) (60 - 73)  BP: 144/61 (22 @ 08:29) (144/61 - 166/65)  RR: 15 (22 @ 08:29) (15 - 16)  SpO2: 96% (22 @ 08:29) (96% - 100%) - on RA    Daily -  Admission weight () 69.8 kg   Daily Weight in k.9 (13 Sep 2022 05:43)    Gen - NAD, comfortable   HEENT - NCAT, EOMI, MMM  Neck - Supple, No limited ROM  Pulm - diminished base   Cardiovascular - RRR  Chest - left chest wall permcath  Abdomen -  semi tympanic, NT, +BS, +lateral RLQ discoloration  Extremities - No Cyanosis, no clubbing, minimal edema to b/l ankles, no calf tenderness  Neuro-     Cognitive - awake, alert, oriented to person and place. Delayed processing/response     Communication - Fluent     Cranial Nerves - CN 2-12 intact. No facial asymmetry, Tongue midline, EOMI, Shoulder shrug intact     Motor -                     LEFT    UE - 3+5                    RIGHT UE - 3-/5                    LEFT    LE - 3+/5                    RIGHT LE - 3-/5        Sensory - Intact to LT bilaterally  MSK: generalized weakness  Skin:  stage 2 pressure ulcer to sacrum 0.3 x 0.2,  discoloration to lateral RLQ of abdomen      RECENT LABS:                        9.6    6.46  )-----------( 400      ( 10 Sep 2022 12:17 )             28.8     09-10    128<L>  |  89<L>  |  113<H>  ----------------------------<  70  4.7   |  22  |  6.42<H>    Ca    8.7      10 Sep 2022 12:17  Phos  7.4     09-10    PT/INR - ( 13 Sep 2022 06:09 )   PT: 20.7 sec;   INR: 1.77 ratio        CAPILLARY BLOOD GLUCOSE  POCT Blood Glucose.: 119 mg/dL (13 Sep 2022 07:35)  POCT Blood Glucose.: 216 mg/dL (12 Sep 2022 22:00)  POCT Blood Glucose.: 245 mg/dL (12 Sep 2022 17:23)  POCT Blood Glucose.: 141 mg/dL (12 Sep 2022 13:36)    < from: Xray Chest 1 View- PORTABLE-Routine (Xray Chest 1 View- PORTABLE-Routine .) (22 @ 12:04) >  Interval exchange of the dialysis catheter. No evidence of pneumothorax.   No other significant change.    Allergies  No Known Allergies    MEDICATIONS  (STANDING):  amLODIPine   Tablet 10 milliGRAM(s) Oral daily  artificial  tears Solution 2 Drop(s) Both EYES every 6 hours  brivaracetam 50 milliGRAM(s) Oral two times a day  carvedilol 12.5 milliGRAM(s) Oral every 12 hours  chlorhexidine 2% Cloths 1 Application(s) Topical <User Schedule>  dextrose 5%. 1000 milliLiter(s) (100 mL/Hr) IV Continuous <Continuous>  dextrose 5%. 1000 milliLiter(s) (50 mL/Hr) IV Continuous <Continuous>  dextrose 50% Injectable 25 Gram(s) IV Push once  dextrose 50% Injectable 12.5 Gram(s) IV Push once  dextrose 50% Injectable 25 Gram(s) IV Push once  doxazosin 4 milliGRAM(s) Oral <User Schedule>  epoetin cortney-epbx (RETACRIT) Injectable 07493 Unit(s) IV Push <User Schedule>  finasteride 5 milliGRAM(s) Oral daily  furosemide    Tablet 80 milliGRAM(s) Oral daily  glucagon  Injectable 1 milliGRAM(s) IntraMuscular once  insulin glargine Injectable (LANTUS) 10 Unit(s) SubCutaneous at bedtime  insulin lispro (ADMELOG) corrective regimen sliding scale   SubCutaneous three times a day before meals  insulin lispro (ADMELOG) corrective regimen sliding scale   SubCutaneous at bedtime  insulin lispro Injectable (ADMELOG) 4 Unit(s) SubCutaneous three times a day before meals  levothyroxine 50 MICROGram(s) Oral daily  melatonin 6 milliGRAM(s) Oral at bedtime  multivitamin 1 Tablet(s) Oral daily  mycophenolate mofetil 500 milliGRAM(s) Oral <User Schedule>  nystatin    Suspension 912380 Unit(s) Oral four times a day  pantoprazole    Tablet 40 milliGRAM(s) Oral <User Schedule>  phenytoin   Capsule 200 milliGRAM(s) Oral two times a day  predniSONE   Tablet 10 milliGRAM(s) Oral daily  sirolimus 7 milliGRAM(s) Oral <User Schedule>  trimethoprim   80 mG/sulfamethoxazole 400 mG 1 Tablet(s) Oral daily  valGANciclovir 450 milliGRAM(s) Oral <User Schedule>  warfarin 7 milliGRAM(s) Oral once    MEDICATIONS  (PRN):  acetaminophen     Tablet .. 650 milliGRAM(s) Oral every 6 hours PRN Temp greater or equal to 38C (100.4F), Mild Pain (1 - 3)  dextrose Oral Gel 15 Gram(s) Oral once PRN Blood Glucose LESS THAN 70 milliGRAM(s)/deciliter  polyethylene glycol 3350 17 Gram(s) Oral daily PRN Constipation   SUBJECTIVE/ROS:  Patient seen and evaluated while resting in bed - off O2.  States he slept and doesn't feel tired or fatigued  Denies CP, palpitation, SOB, or cough  Appetite is fine, no nausea or abd pain.  LBM yesterday ()   Denies dysuria or frequency  Was seen again later during PT - working on sit to stand    D/w son Dr Terrell yesterday, he gave details on patients clinically state prior to rehab admission    INR low will increase coumadin dose    Vital Signs Last 24 Hrs  T(C): 36.7 (22 @ 08:29), Max: 36.7 (22 @ 20:13)  T(F): 98 (22 @ 08:29), Max: 98 (22 @ 20:13)  HR: 60 (22 @ 08:29) (60 - 73)  BP: 144/61 (22 @ 08:29) (144/61 - 166/65)  RR: 15 (22 @ 08:29) (15 - 16)  SpO2: 96% (22 @ 08:29) (96% - 100%) - on RA    Daily -  Admission weight () 69.8 kg   Daily Weight in k.9 (13 Sep 2022 05:43)    Gen - NAD, comfortable   HEENT - NCAT, EOMI, MMM  Neck - Supple, No limited ROM  Pulm - diminished base   Cardiovascular - RRR  Chest - left chest wall permcath  Abdomen -  semi tympanic, NT, +BS, +lateral RLQ discoloration  Extremities - No Cyanosis, no clubbing, minimal edema to b/l ankles, no calf tenderness  Neuro-     Cognitive - awake, alert, oriented to person and place. Delayed processing/response     Communication - Fluent     Cranial Nerves - CN 2-12 intact. No facial asymmetry, Tongue midline, EOMI, Shoulder shrug intact     Motor -                     LEFT    UE - 3+5                    RIGHT UE - 3-/5                    LEFT    LE - 3+/5                    RIGHT LE - 3-/5        Sensory - Intact to LT bilaterally  MSK: generalized weakness  Skin:  stage 2 pressure ulcer to sacrum 0.3 x 0.2,  discoloration to lateral RLQ of abdomen    Therapy--alert and more engaging,   Motor function improving  Will focus on endurance and strengthening     RECENT LABS:                        9.6    6.46  )-----------( 400      ( 10 Sep 2022 12:17 )             28.8     09-10    128<L>  |  89<L>  |  113<H>  ----------------------------<  70  4.7   |  22  |  6.42<H>    Ca    8.7      10 Sep 2022 12:17  Phos  7.4     09-10    PT/INR - ( 13 Sep 2022 06:09 )   PT: 20.7 sec;   INR: 1.77 ratio        CAPILLARY BLOOD GLUCOSE  POCT Blood Glucose.: 119 mg/dL (13 Sep 2022 07:35)  POCT Blood Glucose.: 216 mg/dL (12 Sep 2022 22:00)  POCT Blood Glucose.: 245 mg/dL (12 Sep 2022 17:23)  POCT Blood Glucose.: 141 mg/dL (12 Sep 2022 13:36)    < from: Xray Chest 1 View- PORTABLE-Routine (Xray Chest 1 View- PORTABLE-Routine .) (22 @ 12:04) >  Interval exchange of the dialysis catheter. No evidence of pneumothorax.   No other significant change.    Allergies  No Known Allergies    MEDICATIONS  (STANDING):  amLODIPine   Tablet 10 milliGRAM(s) Oral daily  artificial  tears Solution 2 Drop(s) Both EYES every 6 hours  brivaracetam 50 milliGRAM(s) Oral two times a day  carvedilol 12.5 milliGRAM(s) Oral every 12 hours  chlorhexidine 2% Cloths 1 Application(s) Topical <User Schedule>  dextrose 5%. 1000 milliLiter(s) (100 mL/Hr) IV Continuous <Continuous>  dextrose 5%. 1000 milliLiter(s) (50 mL/Hr) IV Continuous <Continuous>  dextrose 50% Injectable 25 Gram(s) IV Push once  dextrose 50% Injectable 12.5 Gram(s) IV Push once  dextrose 50% Injectable 25 Gram(s) IV Push once  doxazosin 4 milliGRAM(s) Oral <User Schedule>  epoetin cortney-epbx (RETACRIT) Injectable 71955 Unit(s) IV Push <User Schedule>  finasteride 5 milliGRAM(s) Oral daily  furosemide    Tablet 80 milliGRAM(s) Oral daily  glucagon  Injectable 1 milliGRAM(s) IntraMuscular once  insulin glargine Injectable (LANTUS) 10 Unit(s) SubCutaneous at bedtime  insulin lispro (ADMELOG) corrective regimen sliding scale   SubCutaneous three times a day before meals  insulin lispro (ADMELOG) corrective regimen sliding scale   SubCutaneous at bedtime  insulin lispro Injectable (ADMELOG) 4 Unit(s) SubCutaneous three times a day before meals  levothyroxine 50 MICROGram(s) Oral daily  melatonin 6 milliGRAM(s) Oral at bedtime  multivitamin 1 Tablet(s) Oral daily  mycophenolate mofetil 500 milliGRAM(s) Oral <User Schedule>  nystatin    Suspension 585561 Unit(s) Oral four times a day  pantoprazole    Tablet 40 milliGRAM(s) Oral <User Schedule>  phenytoin   Capsule 200 milliGRAM(s) Oral two times a day  predniSONE   Tablet 10 milliGRAM(s) Oral daily  sirolimus 7 milliGRAM(s) Oral <User Schedule>  trimethoprim   80 mG/sulfamethoxazole 400 mG 1 Tablet(s) Oral daily  valGANciclovir 450 milliGRAM(s) Oral <User Schedule>  warfarin 7 milliGRAM(s) Oral once    MEDICATIONS  (PRN):  acetaminophen     Tablet .. 650 milliGRAM(s) Oral every 6 hours PRN Temp greater or equal to 38C (100.4F), Mild Pain (1 - 3)  dextrose Oral Gel 15 Gram(s) Oral once PRN Blood Glucose LESS THAN 70 milliGRAM(s)/deciliter  polyethylene glycol 3350 17 Gram(s) Oral daily PRN Constipation

## 2022-09-13 NOTE — PROGRESS NOTE ADULT - ASSESSMENT
66 yr old Male with PMHx of DM type II, HTN, CAD s/p stents/ CABG (2020), AFib on Eliquis, CVA (2019) d/t Afib, Seizure d/o following CVA, last episode was on 4/8/22, h/o GIB (2/2022) EGD with duodenal ulcer.  ESRD due to DM was on HD since 2019, s/p premacath removal 5/10/22 s/p Right DDRT (4/21/22) and placed on belatacept.  Frequent hospitalization d/t weakness, refractory sz, and sepsis.  Admitted and intubated 6/10, found to large pleural effusion s/p thoracentesis ~1.3 and VATS of thoracic 2.2 L.      * INR 1.77 (9/13) - coumadin 7mg for today  * CXR (9/9) stable  * transplant team PHONE  --Iker Alfred (MD) ??  Dr. Eduardo Barber (transplant surgeon) - Darleen Dior NP [phone]  * Last HD yesterday 9/12 - overall looks better today.      COMORBIDITES/ACTIVE MEDICAL ISSUES   Gait Instability, ADL impairments and Functional impairments secondary to recurrent UTI, status epilepticus, with debility seizures. 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  Belatacept d/t 8/1 and was started on Sirolimus 7mg QD  - Prednisone 10  - Cellcept 250 BID on hold  - S/p perma cath placement 8/30/2022  - PPx medications: Nystatin, Bactrim, Valcyte (MWF)  - Nephrology following - T/Th/Sat    #Anemia of chronic disease  - Has gotten total of 6U PRBC and 2 U FFP during acute hospital   - monitor H/H    #HTN  #Afib  - Eliquis switched to Coumadin  - Coreg   - INR 1.77 (9/12) + Coumadin dose- 7mg for 9/13    #Diabetes  - A1C  - Lantus  - Admelog  - FS + ISS  - Hospitalist following     #Hypothyroidism  - Synthroid 37.5 mcg    #Pain control  - Tylenol PRN    #GI/Bowel Mgmt   - At risk for constipation due to neurologic diagnosis, immobility and/or medication use  - Senna - hold d/t loose BM,  Miralax PRN    #Bladder management  - incontinence     #DVT   - acute DVT to R IJ thrombosis, Superficial vein thrombus to right basilic and cephalic vein  - Coumadin  - SCDs, TEDs     #Skin:  -Stage 2 pressure ulcer to sacrum 0.3 x 0.2- cleanse with NS, apply wet gauze andcover with allevyn foam dressing  -At risk for pressure injury due to neurologic diagnosis and relative immobility  -Bilateral heels - soft heel protectors, apply liquid barrier film daily and monitor for tissue type changes  - Turn Q2 hr while in bed, air mattress  - Skin barrier cream as needed  - Nursing to monitor skin Qshift    Diet  - Regular + Thins  [CCHO, DASH/TLC]    + Fluid Restriction: 1L    Precautions / PROPHYLAXIS:   - Falls, Cardiac, Seizure   - ortho: Weight bearing status: WBAT   - Lungs: Aspiration, Incentive Spirometer   - Pressure injury/Skin: Turn Q2hrs while in bed, OOB to Chair, PT/OT      IDT 9/12  SW: lives with spouse and 2 sons in  with 6 JAR[place]TT, 1st [place] setup.  PTA needed some assistance with ADLs  NSG: total A/incont  + stage 2 to sacrum  Functional status: max A with ADLs except mod A for stand-pivot with standby, transferring with bert (likely upgrade to jena dunaway soon), decreased core strength.  SLP: currrently on easy to chew d/t dentures, has severe cog deficit, mild dysarthria, sequence ADLs with min cues  Goals: Close SV with ADLs, CG with functional transfer/amb/stair (but min-mod A more realistically)  Barriers: decreased safety, atrophy d/t deconditioning  TDD: 9/29 home    Follow ups:  Andre Patterson)  Clinical Neurophysiology; EEGEpilepsy; Neurology  [address] [facility], [address]  [place] [address]  Phone: [phone]  Fax: [phone]  Follow Up Time:     Padmini Lincoln (DO)  Internal Medicine  [address] [facility], [facility] [address]  [place] [address]  Phone: [phone]  Fax: [phone]  Follow Up Time:     Iker Alfred)  Internal Medicine; Nephrology  [address] [place] [address]  Phone: [phone]  Fax: [phone]  Follow Up Time:    Dr. Eduardo Barber - Organ Transplant    Iker Alfred  [facility] Physician [facility]  NEPHRO [address] [place] [address]  Scheduled Appointment: 09/20/2022    Iker Alfred  [facility]  NEPHRO [address] [place] [address] D  Scheduled Appointment: 10/20/2022     66 yr old Male with PMHx of DM type II, HTN, CAD s/p stents/ CABG (2020), AFib on Eliquis, CVA (2019) d/t Afib, Seizure d/o following CVA, last episode was on 4/8/22, h/o GIB (2/2022) EGD with duodenal ulcer.  ESRD due to DM was on HD since 2019, s/p premacath removal 5/10/22 s/p Right DDRT (4/21/22) and placed on belatacept.  Frequent hospitalization d/t weakness, refractory sz, and sepsis.  Admitted and intubated 6/10, found to large pleural effusion s/p thoracentesis ~1.3 and VATS of thoracic 2.2 L.      * INR 1.77 (9/13) - coumadin 7mg for today  * CXR (9/9) stable  * transplant team PHONE  --Iker Alfred (MD) ??  Dr. Eduardo Barber (transplant surgeon) - Darleen Dior NP [phone]  * Last HD yesterday 9/12 - overall looks better today.      COMORBIDITES/ACTIVE MEDICAL ISSUES   Gait Instability, ADL impairments and Functional impairments secondary to recurrent UTI, status epilepticus, with debility seizures. 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  Belatacept d/t 8/1 and was started on Sirolimus 7mg QD  - Prednisone 10  - Cellcept 250 BID on hold  - S/p perma cath placement 8/30/2022  - PPx medications: Nystatin, Bactrim, Valcyte (MWF)  - Nephrology following - T/Th/Sat    #Anemia of chronic disease  - Has gotten total of 6U PRBC and 2 U FFP during acute hospital   - monitor H/H    #HTN  #Afib  - Eliquis switched to Coumadin  - Coreg   - INR 1.77 (9/12) + Coumadin dose- 7mg for 9/13    #Diabetes  - A1C  - Lantus  - Admelog  - FS + ISS  - Hospitalist following     #Hypothyroidism  - Synthroid 37.5 mcg    #Pain control  - Tylenol PRN    #GI/Bowel Mgmt   - At risk for constipation due to neurologic diagnosis, immobility and/or medication use  - Senna - hold d/t loose BM,  Miralax PRN    #Bladder management  - incontinence     #DVT   - acute DVT to R IJ thrombosis, Superficial vein thrombus to right basilic and cephalic vein  - Coumadin  - SCDs, TEDs     #Skin:  -Stage 2 pressure ulcer to sacrum 0.3 x 0.2- cleanse with NS, apply wet gauze andcover with allevyn foam dressing  -At risk for pressure injury due to neurologic diagnosis and relative immobility  -Bilateral heels - soft heel protectors, apply liquid barrier film daily and monitor for tissue type changes  - Turn Q2 hr while in bed, air mattress  - Skin barrier cream as needed  - Nursing to monitor skin Qshift    Diet  - Regular + Thins  [CCHO, DASH/TLC]    + Fluid Restriction: 1L    Precautions / PROPHYLAXIS:   - Falls, Cardiac, Seizure   - ortho: Weight bearing status: WBAT   - Lungs: Aspiration, Incentive Spirometer   - Pressure injury/Skin: Turn Q2hrs while in bed, OOB to Chair, PT/OT      liaison with family 9/12--Spoke with son  Chris  He gave update on patient's clinical state, investigation results and function prior to acute rehab treatment   I gave update on patients current status, and he was happy with same  Plan to to give interval updates to family as needed    IDT 9/12  SW: lives with spouse and 2 sons in  with 6 J[place]TT, 1st [place] setup.  PTA needed some assistance with ADLs  NSG: total A/incont  + stage 2 to sacrum  Functional status: max A with ADLs except mod A for stand-pivot with standby, transferring with bert (likely upgrade to jena dunaway soon), decreased core strength.  SLP: currrently on easy to chew d/t dentures, has severe cog deficit, mild dysarthria, sequence ADLs with min cues  Goals: Close SV with ADLs, CG with functional transfer/amb/stair (but min-mod A more realistically)  Barriers: decreased safety, atrophy d/t deconditioning  TDD: 9/29 home    Follow ups:  Anrde Patterson)  Clinical Neurophysiology; EEGEpilepsy; Neurology  [address] [facility], [address]  [place] [address]  Phone: [phone]  Fax: [phone]  Follow Up Time:     Padmini Lincoln ()  Internal Medicine  [address] [facility], [facility] [address]  [place] [address]  Phone: [phone]  Fax: [phone]  Follow Up Time:     Iker Alfred)  Internal Medicine; Nephrology  [address] [place] [address]  Phone: [phone]  Fax: [phone]  Follow Up Time:    Dr. Eduardo Barber - Organ Transplant    Iker Alfred  [facility] Physician [facility]  NEPHRO [address] [place] [address] D  Scheduled Appointment: 09/20/2022    Iker Alfred  [facility] Physician [facility]  NEPHRO [address] [place] [address] D  Scheduled Appointment: 10/20/2022

## 2022-09-13 NOTE — PROGRESS NOTE ADULT - ASSESSMENT
66 yr old Male with PMHx of DM 2, HTN, CAD s/p stents/ CABG (2020), AFib on Eliquis, CVA (2019) d/t Afib, Seizure following CVA, last episode was on 4/8/22, h/o GIB (2/2022) EGD with duodenal ulcer.  ESRD due to DM was on HD since 2019, s/p premacath removal 5/10/22 s/p Right DDRT (4/21/22) and placed on belatacept.  Frequent hospitalization d/t weakness, refractory sz, and sepsis.  Admitted and intubated 6/10, found to large pleural effusion s/p thoracentesis ~1.3 and VATS of thoracic 2.2 L.  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  Belatacept d/t 8/1 and was started on Sirolimus 7mg QD  - Prednisone   - Cellcept hold  - S/p perma cath placement 8/30/2022  - PPx medications: Nystatin, Bactrim, Valcyte (MWF)  - Nephrology consulT    #Anemia of chronic disease  - S/P  6U PRBC and 2 U FFP during acute hospital   - monitor H/H    #HTN  #Afib  - Eliquis switched to Coumadin  - INR NOTED,   - DOSE COUMADIN DAILY  - Coreg     #Diabetes  - A1C  - accuchecks noted  - HOME MED Lantus AND NOVOLOG PREMEAL TID  -lantus 10, premeal 4 u tid  - FS + ISS    #Hypothyroidism  - Synthroid    #Pain control  - Tylenol PRN    #sacral DU-  - manage per wound care and primary team    #DVT   - acute DVT to R IJ thrombosis, Superficial vein thrombus to right basilic and cephalic vein  - Coumadin  - SCDs, TEDs     will follow  d/w dr. joiner

## 2022-09-13 NOTE — PROGRESS NOTE ADULT - SUBJECTIVE AND OBJECTIVE BOX
66 yr old Male with PMHx of DM 2, HTN, CAD s/p stents/ CABG (2020), AFib on Eliquis, CVA (2019) d/t Afib, Seizure following CVA, last episode was on 4/8/22, h/o GIB (2/2022) EGD with duodenal ulcer.  ESRD due to DM was on HD since 2019, s/p premacath removal 5/10/22 s/p Right DDRT (4/21/22) and placed on belatacept.  Frequent hospitalization d/t weakness, refractory sz, and sepsis      seen at the bedside, for medical consultation, c/o constant sacral and buttock pain, 7/10, aching, non radiating, aggravated by pressure, no n/v, no sob.    Vital Signs Last 24 Hrs  T(C): 36.7 (13 Sep 2022 08:29), Max: 36.7 (12 Sep 2022 20:13)  T(F): 98 (13 Sep 2022 08:29), Max: 98 (12 Sep 2022 20:13)  HR: 60 (13 Sep 2022 08:29) (60 - 73)  BP: 144/61 (13 Sep 2022 08:29) (144/61 - 166/65)  BP(mean): --  RR: 15 (13 Sep 2022 08:29) (15 - 16)  SpO2: 96% (13 Sep 2022 08:29) (96% - 97%)    Parameters below as of 13 Sep 2022 08:29  Patient On (Oxygen Delivery Method): room air        GENERAL- NAD  EAR/NOSE/MOUTH/THROAT - no pharyngeal exudates, no oral lesion's  MMM  EYES- PEDRO, conjunctiva and Sclera clear  NECK- supple  RESPIRATORY-  clear to auscultation bilaterally, non laboured breathing  CARDIOVASCULAR - SIS2, RRR  GI - soft NT BS present, abdominal wall edema right flank and RLQ.   EXTREMITIES- no pedal edema  NEUROLOGY- mild LUE weakness compared to RUE  PSYCHIATRY- AAO X 3                  8.0                  131  | 24   | 90           5.78  >-----------< 391     ------------------------< 233                   25.7                 3.9  | 92   | 5.65                                         Ca 8.1   Mg x     Ph 6.8          CAPILLARY BLOOD GLUCOSE      POCT Blood Glucose.: 144 mg/dL (13 Sep 2022 11:28)  POCT Blood Glucose.: 119 mg/dL (13 Sep 2022 07:35)  POCT Blood Glucose.: 216 mg/dL (12 Sep 2022 22:00)  POCT Blood Glucose.: 245 mg/dL (12 Sep 2022 17:23)       PT/INR - ( 13 Sep 2022 06:09 )   PT: 20.7 sec;   INR: 1.77 ratio           MEDICATIONS  (STANDING):  amLODIPine   Tablet 10 milliGRAM(s) Oral daily  artificial  tears Solution 2 Drop(s) Both EYES every 6 hours  brivaracetam 50 milliGRAM(s) Oral two times a day  carvedilol 12.5 milliGRAM(s) Oral every 12 hours  doxazosin 4 milliGRAM(s) Oral <User Schedule>  epoetin cortney-epbx (RETACRIT) Injectable 36756 Unit(s) IV Push <User Schedule>  finasteride 5 milliGRAM(s) Oral daily  furosemide    Tablet 80 milliGRAM(s) Oral daily  glucagon  Injectable 1 milliGRAM(s) IntraMuscular once  insulin glargine Injectable (LANTUS) 10 Unit(s) SubCutaneous at bedtime  insulin lispro (ADMELOG) corrective regimen sliding scale   SubCutaneous three times a day before meals  insulin lispro (ADMELOG) corrective regimen sliding scale   SubCutaneous at bedtime  insulin lispro Injectable (ADMELOG) 4 Unit(s) SubCutaneous three times a day before meals  levothyroxine 50 MICROGram(s) Oral daily  melatonin 6 milliGRAM(s) Oral at bedtime  multivitamin 1 Tablet(s) Oral daily  mycophenolate mofetil 500 milliGRAM(s) Oral <User Schedule>  nystatin    Suspension 023972 Unit(s) Oral four times a day  pantoprazole    Tablet 40 milliGRAM(s) Oral <User Schedule>  phenytoin   Capsule 200 milliGRAM(s) Oral two times a day  predniSONE   Tablet 10 milliGRAM(s) Oral daily  sirolimus 7 milliGRAM(s) Oral <User Schedule>  trimethoprim   80 mG/sulfamethoxazole 400 mG 1 Tablet(s) Oral daily  valGANciclovir 450 milliGRAM(s) Oral <User Schedule>  warfarin 7 milliGRAM(s) Oral once    MEDICATIONS  (PRN):  acetaminophen     Tablet .. 650 milliGRAM(s) Oral every 6 hours PRN Temp greater or equal to 38C (100.4F), Mild Pain (1 - 3)  dextrose Oral Gel 15 Gram(s) Oral once PRN Blood Glucose LESS THAN 70 milliGRAM(s)/deciliter  polyethylene glycol 3350 17 Gram(s) Oral daily PRN Constipation

## 2022-09-13 NOTE — PROGRESS NOTE ADULT - SUBJECTIVE AND OBJECTIVE BOX
No distress    Vital Signs Last 24 Hrs  T(C): 36.7 (09-13-22 @ 08:29), Max: 36.7 (09-12-22 @ 20:13)  T(F): 98 (09-13-22 @ 08:29), Max: 98 (09-12-22 @ 20:13)  HR: 60 (09-13-22 @ 08:29) (60 - 73)  BP: 144/61 (09-13-22 @ 08:29) (144/61 - 166/65)  RR: 15 (09-13-22 @ 08:29) (15 - 16)  SpO2: 96% (09-13-22 @ 08:29) (96% - 97%)    I&O's Detail    12 Sep 2022 07:01  -  13 Sep 2022 07:00  --------------------------------------------------------  OUT:    Other (mL): 2000 mL  Total OUT: 2000 mL    s1s2  b/l air entry  soft, ND  no edema  alert                                 8.0    5.78  )-----------( 391      ( 12 Sep 2022 06:50 )             25.7     12 Sep 2022 06:50    131    |  92     |  90     ----------------------------<  233    3.9     |  24     |  5.65     Ca    8.1        12 Sep 2022 06:50  Phos  6.8       12 Sep 2022 06:50    TPro  x      /  Alb  1.8    /  TBili  x      /  DBili  x      /  AST  x      /  ALT  x      /  AlkPhos  x      12 Sep 2022 06:50    LIVER FUNCTIONS - ( 12 Sep 2022 06:50 )  Alb: 1.8 g/dL / Pro: x     / ALK PHOS: x     / ALT: x     / AST: x     / GGT: x           PT/INR - ( 13 Sep 2022 06:09 )   PT: 20.7 sec;   INR: 1.77 ratio      acetaminophen     Tablet .. 650 milliGRAM(s) Oral every 6 hours PRN  amLODIPine   Tablet 10 milliGRAM(s) Oral daily  artificial  tears Solution 2 Drop(s) Both EYES every 6 hours  brivaracetam 50 milliGRAM(s) Oral two times a day  carvedilol 12.5 milliGRAM(s) Oral every 12 hours  chlorhexidine 2% Cloths 1 Application(s) Topical <User Schedule>  dextrose 5%. 1000 milliLiter(s) IV Continuous <Continuous>  dextrose 5%. 1000 milliLiter(s) IV Continuous <Continuous>  dextrose 50% Injectable 25 Gram(s) IV Push once  dextrose 50% Injectable 12.5 Gram(s) IV Push once  dextrose 50% Injectable 25 Gram(s) IV Push once  dextrose Oral Gel 15 Gram(s) Oral once PRN  doxazosin 4 milliGRAM(s) Oral <User Schedule>  epoetin cortney-epbx (RETACRIT) Injectable 01973 Unit(s) IV Push <User Schedule>  finasteride 5 milliGRAM(s) Oral daily  furosemide    Tablet 80 milliGRAM(s) Oral daily  glucagon  Injectable 1 milliGRAM(s) IntraMuscular once  insulin glargine Injectable (LANTUS) 10 Unit(s) SubCutaneous at bedtime  insulin lispro (ADMELOG) corrective regimen sliding scale   SubCutaneous three times a day before meals  insulin lispro (ADMELOG) corrective regimen sliding scale   SubCutaneous at bedtime  insulin lispro Injectable (ADMELOG) 4 Unit(s) SubCutaneous three times a day before meals  levothyroxine 50 MICROGram(s) Oral daily  melatonin 6 milliGRAM(s) Oral at bedtime  multivitamin 1 Tablet(s) Oral daily  mycophenolate mofetil 500 milliGRAM(s) Oral <User Schedule>  nystatin    Suspension 069893 Unit(s) Oral four times a day  pantoprazole    Tablet 40 milliGRAM(s) Oral <User Schedule>  phenytoin   Capsule 200 milliGRAM(s) Oral two times a day  polyethylene glycol 3350 17 Gram(s) Oral daily PRN  predniSONE   Tablet 10 milliGRAM(s) Oral daily  sirolimus 7 milliGRAM(s) Oral <User Schedule>  trimethoprim   80 mG/sulfamethoxazole 400 mG 1 Tablet(s) Oral daily  valGANciclovir 450 milliGRAM(s) Oral <User Schedule>  warfarin 7 milliGRAM(s) Oral once    A/P:    S/p complicated hospital course as per HPI  Now in [place]  S/p DDRT 4/21/22, required HD, came off HD since 5/5/22, back on HD since 7/27/22  Will continue transplant meds per transplant team recommendations  Pt w/some UO  Avoid nephrotoxins  Epoetin w/HD 3 x week  Renal diet  Bld work w/HD   D/w family at bedside    [phone]

## 2022-09-14 NOTE — PROGRESS NOTE ADULT - SUBJECTIVE AND OBJECTIVE BOX
No distress    Vital Signs Last 24 Hrs  T(C): 36.5 (09-14-22 @ 17:05), Max: 36.6 (09-14-22 @ 14:05)  T(F): 97.7 (09-14-22 @ 17:05), Max: 97.9 (09-14-22 @ 14:05)  HR: 64 (09-14-22 @ 17:50) (57 - 64)  BP: 169/69 (09-14-22 @ 17:50) (142/69 - 169/69)  RR: 16 (09-14-22 @ 17:50) (15 - 16)  SpO2: 97% (09-14-22 @ 17:50) (96% - 99%)    I&O's Detail    14 Sep 2022 07:01  -  14 Sep 2022 23:10  --------------------------------------------------------  IN:    Other (mL): 800 mL  Total IN: 800 mL    OUT:    Other (mL): 3300 mL  Total OUT: 3300 mL    Total NET: -2500 mL    s1s2  b/l air entry  soft, ND  no edema  AO                                         8.3    6.27  )-----------( 356      ( 14 Sep 2022 14:15 )             26.4     14 Sep 2022 06:00    133    |  94     |  86     ----------------------------<  94     4.0     |  24     |  5.33     Ca    8.5        14 Sep 2022 06:00  Phos  6.4       14 Sep 2022 06:00    TPro  x      /  Alb  1.9    /  TBili  x      /  DBili  x      /  AST  x      /  ALT  x      /  AlkPhos  x      14 Sep 2022 06:00    LIVER FUNCTIONS - ( 14 Sep 2022 06:00 )  Alb: 1.9 g/dL / Pro: x     / ALK PHOS: x     / ALT: x     / AST: x     / GGT: x           PT/INR - ( 14 Sep 2022 06:00 )   PT: 20.6 sec;   INR: 1.77 ratio      acetaminophen     Tablet .. 650 milliGRAM(s) Oral every 6 hours PRN  amLODIPine   Tablet 10 milliGRAM(s) Oral daily  artificial  tears Solution 2 Drop(s) Both EYES every 6 hours  brivaracetam 50 milliGRAM(s) Oral two times a day  carvedilol 12.5 milliGRAM(s) Oral every 12 hours  chlorhexidine 2% Cloths 1 Application(s) Topical <User Schedule>  dextrose 5%. 1000 milliLiter(s) IV Continuous <Continuous>  dextrose 5%. 1000 milliLiter(s) IV Continuous <Continuous>  dextrose 50% Injectable 25 Gram(s) IV Push once  dextrose 50% Injectable 12.5 Gram(s) IV Push once  dextrose 50% Injectable 25 Gram(s) IV Push once  dextrose Oral Gel 15 Gram(s) Oral once PRN  doxazosin 4 milliGRAM(s) Oral <User Schedule>  epoetin cortnye-epbx (RETACRIT) Injectable 72008 Unit(s) IV Push <User Schedule>  finasteride 5 milliGRAM(s) Oral daily  furosemide    Tablet 80 milliGRAM(s) Oral daily  glucagon  Injectable 1 milliGRAM(s) IntraMuscular once  insulin glargine Injectable (LANTUS) 10 Unit(s) SubCutaneous at bedtime  insulin lispro (ADMELOG) corrective regimen sliding scale   SubCutaneous three times a day before meals  insulin lispro (ADMELOG) corrective regimen sliding scale   SubCutaneous at bedtime  levothyroxine 50 MICROGram(s) Oral daily  melatonin 6 milliGRAM(s) Oral at bedtime  multivitamin 1 Tablet(s) Oral daily  mycophenolate mofetil 500 milliGRAM(s) Oral <User Schedule>  nystatin    Suspension 006333 Unit(s) Oral four times a day  pantoprazole    Tablet 40 milliGRAM(s) Oral <User Schedule>  phenytoin   Capsule 200 milliGRAM(s) Oral two times a day  polyethylene glycol 3350 17 Gram(s) Oral daily PRN  predniSONE   Tablet 10 milliGRAM(s) Oral daily  sirolimus 7 milliGRAM(s) Oral <User Schedule>  trimethoprim   80 mG/sulfamethoxazole 400 mG 1 Tablet(s) Oral daily  valGANciclovir 450 milliGRAM(s) Oral <User Schedule>    A/P:    S/p complicated hospital course as per HPI  Now in [place]  S/p DDRT 4/21/22, required HD, came off HD since 5/5/22, back on HD since 7/27/22  Will continue transplant meds per transplant team recommendations  Pt w/some UO  Avoid nephrotoxins  Epoetin w/HD 3 x week  Renal diet  Renvela for high Phos  Bld work w/each HD   D/w family at bedside    [phone]

## 2022-09-14 NOTE — PROGRESS NOTE ADULT - SUBJECTIVE AND OBJECTIVE BOX
SUBJECTIVE/ROS:  Patient seen and evaluated while sitting up in WC at bedside with OT  c/o difficulty with voiding, but no dysuria - will monitor PVR but hard because he's incontinence  Slept well   Denies CP, palpitation, SOB, or cough  no nausea or abd pain.  LBM yesterday ()     Tolerating therapy - seen later in OT -> ambulated at least 6 steps using RW + WC follow    Vital Signs Last 24 Hrs  T(C): 36.2 (14 Sep 2022 08:00), Max: 36.6 (13 Sep 2022 21:00)  T(F): 97.1 (14 Sep 2022 08:00), Max: 97.9 (13 Sep 2022 21:00)  HR: 57 (14 Sep 2022 08:00) (57 - 65)  BP: 152/- (14 Sep 2022 08:00) (152/- - 171/75)  RR: 15 (14 Sep 2022 08:00) (15 - 15)  SpO2: 97% (14 Sep 2022 08:00) (95% - 99%) on RA    Daily -  Admission weight () 69.8 kg   Daily Weight in k.9 (13 Sep 2022 05:43)    Gen - NAD, comfortable   HEENT - NCAT, EOMI, MMM  Neck - Supple, No limited ROM  Pulm - diminished base   Cardiovascular - RRR  Chest - left chest wall permcath  Abdomen -  semi tympanic, NT, +BS, +lateral RLQ discoloration  Extremities - No Cyanosis, no clubbing, minimal edema to b/l ankles, no calf tenderness  Neuro-     Cognitive - awake, alert, oriented to person and place. Delayed processing/response     Communication - Fluent     Cranial Nerves - CN 2-12 intact. No facial asymmetry, Tongue midline, EOMI, Shoulder shrug intact     Motor -                     LEFT    UE - 3+5                    RIGHT UE - 3-/5                    LEFT    LE - 3+/5                    RIGHT LE - 3-/5        Sensory - Intact to LT bilaterally  MSK: generalized weakness  Skin:  stage 2 pressure ulcer to sacrum 0.3 x 0.2,  discoloration to lateral RLQ of abdomen    RECENT LABS:                        9.6    6.46  )-----------( 400      ( 10 Sep 2022 12:17 )             28.8     09-10    128<L>  |  89<L>  |  113<H>  ----------------------------<  70  4.7   |  22  |  6.42<H>    Ca    8.7      10 Sep 2022 12:17  Phos  7.4     09-10    PT/INR - ( 14 Sep 2022 06:00 )   PT: 20.6 sec;   INR: 1.77 ratio      CAPILLARY BLOOD GLUCOSE  POCT Blood Glucose.: 95 mg/dL (14 Sep 2022 07:20)  POCT Blood Glucose.: 134 mg/dL (13 Sep 2022 21:44)  POCT Blood Glucose.: 208 mg/dL (13 Sep 2022 17:12)  POCT Blood Glucose.: 144 mg/dL (13 Sep 2022 11:28)      Xray Chest 1 View- PORTABLE-Routine (Xray Chest 1 View- PORTABLE-Routine .) (22 @ 12:04) >  Interval exchange of the dialysis catheter. No evidence of pneumothorax.   No other significant change.    Allergies  No Known Allergies    MEDICATIONS  (STANDING):  amLODIPine   Tablet 10 milliGRAM(s) Oral daily  artificial  tears Solution 2 Drop(s) Both EYES every 6 hours  brivaracetam 50 milliGRAM(s) Oral two times a day  carvedilol 12.5 milliGRAM(s) Oral every 12 hours  chlorhexidine 2% Cloths 1 Application(s) Topical <User Schedule>  dextrose 5%. 1000 milliLiter(s) (100 mL/Hr) IV Continuous <Continuous>  dextrose 5%. 1000 milliLiter(s) (50 mL/Hr) IV Continuous <Continuous>  dextrose 50% Injectable 25 Gram(s) IV Push once  dextrose 50% Injectable 12.5 Gram(s) IV Push once  dextrose 50% Injectable 25 Gram(s) IV Push once  doxazosin 4 milliGRAM(s) Oral <User Schedule>  epoetin cortney-epbx (RETACRIT) Injectable 88310 Unit(s) IV Push <User Schedule>  finasteride 5 milliGRAM(s) Oral daily  furosemide    Tablet 80 milliGRAM(s) Oral daily  glucagon  Injectable 1 milliGRAM(s) IntraMuscular once  insulin glargine Injectable (LANTUS) 10 Unit(s) SubCutaneous at bedtime  insulin lispro (ADMELOG) corrective regimen sliding scale   SubCutaneous three times a day before meals  insulin lispro (ADMELOG) corrective regimen sliding scale   SubCutaneous at bedtime  insulin lispro Injectable (ADMELOG) 4 Unit(s) SubCutaneous three times a day before meals  levothyroxine 50 MICROGram(s) Oral daily  melatonin 6 milliGRAM(s) Oral at bedtime  multivitamin 1 Tablet(s) Oral daily  mycophenolate mofetil 500 milliGRAM(s) Oral <User Schedule>  nystatin    Suspension 139792 Unit(s) Oral four times a day  pantoprazole    Tablet 40 milliGRAM(s) Oral <User Schedule>  phenytoin   Capsule 200 milliGRAM(s) Oral two times a day  predniSONE   Tablet 10 milliGRAM(s) Oral daily  sirolimus 7 milliGRAM(s) Oral <User Schedule>  trimethoprim   80 mG/sulfamethoxazole 400 mG 1 Tablet(s) Oral daily  valGANciclovir 450 milliGRAM(s) Oral <User Schedule>  warfarin 7 milliGRAM(s) Oral once    MEDICATIONS  (PRN):  acetaminophen     Tablet .. 650 milliGRAM(s) Oral every 6 hours PRN Temp greater or equal to 38C (100.4F), Mild Pain (1 - 3)  dextrose Oral Gel 15 Gram(s) Oral once PRN Blood Glucose LESS THAN 70 milliGRAM(s)/deciliter  polyethylene glycol 3350 17 Gram(s) Oral daily PRN Constipation   SUBJECTIVE/ROS:  Patient seen and evaluated while sitting up in WC at bedside with OT  c/o difficulty with voiding, but no dysuria - will monitor PVR but hard because he's incontinence  Slept well   Denies CP, palpitation, SOB, or cough  no nausea or abd pain.  LBM yesterday ()     Tolerating therapy - seen later in OT -> ambulated at least 6 steps using RW + WC follow    Vital Signs Last 24 Hrs  T(C): 36.2 (14 Sep 2022 08:00), Max: 36.6 (13 Sep 2022 21:00)  T(F): 97.1 (14 Sep 2022 08:00), Max: 97.9 (13 Sep 2022 21:00)  HR: 57 (14 Sep 2022 08:00) (57 - 65)  BP: 152/- (14 Sep 2022 08:00) (152/- - 171/75)  RR: 15 (14 Sep 2022 08:00) (15 - 15)  SpO2: 97% (14 Sep 2022 08:00) (95% - 99%) on RA    Observe in therapy--engaging well, ambulating with walker, 2 person assist, improvement of motor function and endurance     Daily -  Admission weight () 69.8 kg   Daily Weight in k.9 (13 Sep 2022 05:43)    Gen - NAD, comfortable   HEENT - NCAT, EOMI, MMM  Neck - Supple, No limited ROM  Pulm - diminished base   Cardiovascular - RRR  Chest - left chest wall permcath  Abdomen -  semi tympanic, NT, +BS, +lateral RLQ discoloration  Extremities - No Cyanosis, no clubbing, minimal edema to b/l ankles, no calf tenderness  Neuro-     Cognitive - awake, alert, oriented to person and place. Delayed processing/response     Communication - Fluent     Cranial Nerves - CN 2-12 intact. No facial asymmetry, Tongue midline, EOMI, Shoulder shrug intact     Motor -                     LEFT    UE - 3+5                    RIGHT UE - 3-/5                    LEFT    LE - 3+/5                    RIGHT LE - 3-/5        Sensory - Intact to LT bilaterally  MSK: generalized weakness  Skin:  stage 2 pressure ulcer to sacrum 0.3 x 0.2,  discoloration to lateral RLQ of abdomen    RECENT LABS:                        9.6    6.46  )-----------( 400      ( 10 Sep 2022 12:17 )             28.8     09-10    128<L>  |  89<L>  |  113<H>  ----------------------------<  70  4.7   |  22  |  6.42<H>    Ca    8.7      10 Sep 2022 12:17  Phos  7.4     09-10    PT/INR - ( 14 Sep 2022 06:00 )   PT: 20.6 sec;   INR: 1.77 ratio      CAPILLARY BLOOD GLUCOSE  POCT Blood Glucose.: 95 mg/dL (14 Sep 2022 07:20)  POCT Blood Glucose.: 134 mg/dL (13 Sep 2022 21:44)  POCT Blood Glucose.: 208 mg/dL (13 Sep 2022 17:12)  POCT Blood Glucose.: 144 mg/dL (13 Sep 2022 11:28)      Xray Chest 1 View- PORTABLE-Routine (Xray Chest 1 View- PORTABLE-Routine .) (22 @ 12:04) >  Interval exchange of the dialysis catheter. No evidence of pneumothorax.   No other significant change.    Allergies  No Known Allergies    MEDICATIONS  (STANDING):  amLODIPine   Tablet 10 milliGRAM(s) Oral daily  artificial  tears Solution 2 Drop(s) Both EYES every 6 hours  brivaracetam 50 milliGRAM(s) Oral two times a day  carvedilol 12.5 milliGRAM(s) Oral every 12 hours  chlorhexidine 2% Cloths 1 Application(s) Topical <User Schedule>  dextrose 5%. 1000 milliLiter(s) (100 mL/Hr) IV Continuous <Continuous>  dextrose 5%. 1000 milliLiter(s) (50 mL/Hr) IV Continuous <Continuous>  dextrose 50% Injectable 25 Gram(s) IV Push once  dextrose 50% Injectable 12.5 Gram(s) IV Push once  dextrose 50% Injectable 25 Gram(s) IV Push once  doxazosin 4 milliGRAM(s) Oral <User Schedule>  epoetin cortney-epbx (RETACRIT) Injectable 42760 Unit(s) IV Push <User Schedule>  finasteride 5 milliGRAM(s) Oral daily  furosemide    Tablet 80 milliGRAM(s) Oral daily  glucagon  Injectable 1 milliGRAM(s) IntraMuscular once  insulin glargine Injectable (LANTUS) 10 Unit(s) SubCutaneous at bedtime  insulin lispro (ADMELOG) corrective regimen sliding scale   SubCutaneous three times a day before meals  insulin lispro (ADMELOG) corrective regimen sliding scale   SubCutaneous at bedtime  insulin lispro Injectable (ADMELOG) 4 Unit(s) SubCutaneous three times a day before meals  levothyroxine 50 MICROGram(s) Oral daily  melatonin 6 milliGRAM(s) Oral at bedtime  multivitamin 1 Tablet(s) Oral daily  mycophenolate mofetil 500 milliGRAM(s) Oral <User Schedule>  nystatin    Suspension 160752 Unit(s) Oral four times a day  pantoprazole    Tablet 40 milliGRAM(s) Oral <User Schedule>  phenytoin   Capsule 200 milliGRAM(s) Oral two times a day  predniSONE   Tablet 10 milliGRAM(s) Oral daily  sirolimus 7 milliGRAM(s) Oral <User Schedule>  trimethoprim   80 mG/sulfamethoxazole 400 mG 1 Tablet(s) Oral daily  valGANciclovir 450 milliGRAM(s) Oral <User Schedule>  warfarin 7 milliGRAM(s) Oral once    MEDICATIONS  (PRN):  acetaminophen     Tablet .. 650 milliGRAM(s) Oral every 6 hours PRN Temp greater or equal to 38C (100.4F), Mild Pain (1 - 3)  dextrose Oral Gel 15 Gram(s) Oral once PRN Blood Glucose LESS THAN 70 milliGRAM(s)/deciliter  polyethylene glycol 3350 17 Gram(s) Oral daily PRN Constipation

## 2022-09-14 NOTE — PROGRESS NOTE ADULT - ASSESSMENT
66 yr old Male with PMHx of DM type II, HTN, CAD s/p stents/ CABG (2020), AFib on Eliquis, CVA (2019) d/t Afib, Seizure d/o following CVA, last episode was on 4/8/22, h/o GIB (2/2022) EGD with duodenal ulcer.  ESRD due to DM was on HD since 2019, s/p premacath removal 5/10/22 s/p Right DDRT (4/21/22) and placed on belatacept.  Frequent hospitalization d/t weakness, refractory sz, and sepsis.  Admitted and intubated 6/10, found to large pleural effusion s/p thoracentesis ~1.3 and VATS of thoracic 2.2 L.      * INR 1.77 (9/14) - coumadin 7mg for today  * transplant team PHONE  --Iker Alfred (MD) ??  Dr. Eduardo Barber (transplant surgeon) - Darleen Dior NP [phone]  * HD today - will check labs.  Will need additional brivaracetam after HD  * check PVR    COMORBIDITES/ACTIVE MEDICAL ISSUES   Gait Instability, ADL impairments and Functional impairments secondary to recurrent UTI, status epilepticus, with debility seizures. 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  Belatacept d/t 8/1 and was started on Sirolimus 7mg QD  - Prednisone 10  - Cellcept 250 BID on hold  - S/p perma cath placement 8/30/2022  - PPx medications: Nystatin, Bactrim, Valcyte (MWF)  - Nephrology following - T/T/Sat -> had HD 9/12, now modified to MWF    #Anemia of chronic disease  - Has gotten total of 6U PRBC and 2 U FFP during acute hospital   - monitor H/H 8.0 (9/12) - repeat 9/14 with HD    #HTN  #Afib  - Eliquis switched to Coumadin  - Coreg   - INR 1.77 (9/12) + Coumadin dose- 7mg for 9/13    #Diabetes  - A1C  - Lantus  - Admelog  - FS + ISS  - Hospitalist following     #Hypothyroidism  - Synthroid 37.5 mcg    #Pain control  - Tylenol PRN    #GI/Bowel Mgmt   - At risk for constipation due to neurologic diagnosis, immobility and/or medication use  -  Miralax PRN    #Bladder management  - incontinence     #DVT   - acute DVT to R IJ thrombosis, Superficial vein thrombus to right basilic and cephalic vein  - Coumadin  - SCDs, TEDs     #Skin:  -Stage 2 pressure ulcer to sacrum 0.3 x 0.2- cleanse with NS, apply wet gauze andcover with allevyn foam dressing  -At risk for pressure injury due to neurologic diagnosis and relative immobility  -Bilateral heels - soft heel protectors, apply liquid barrier film daily and monitor for tissue type changes  - Turn Q2 hr while in bed, air mattress  - Skin barrier cream as needed  - Nursing to monitor skin Qshift    Diet  - Regular/easy to chew + Thins  [[facility], renal diet]    + Fluid Restriction: 1L    Precautions / PROPHYLAXIS:   - Falls, Cardiac, Seizure   - ortho: Weight bearing status: WBAT   - Lungs: Aspiration, Incentive Spirometer   - Pressure injury/Skin: Turn Q2hrs while in bed, OOB to Chair, PT/OT      liaison with family 9/12--Spoke with son  Chris  He gave update on patient's clinical state, investigation results and function prior to acute rehab treatment   I gave update on patients current status, and he was happy with same  Plan to to give interval updates to family as needed    IDT 9/12  SW: lives with spouse and 2 sons in  with 6 [address], 1st [place] setup.  PTA needed some assistance with ADLs  NSG: total A/incont  + stage 2 to sacrum  Functional status: max A with ADLs except mod A for stand-pivot with standby, transferring with bert (likely upgrade to jena dunaway soon), decreased core strength.  SLP: currrently on easy to chew d/t dentures, has severe cog deficit, mild dysarthria, sequence ADLs with min cues  Goals: Close SV with ADLs, CG with functional transfer/amb/stair (but min-mod A more realistically)  Barriers: decreased safety, atrophy d/t deconditioning  TDD: 9/29 home    Follow ups:  Andre Patterson)  Clinical Neurophysiology; EEGEpilepsy; Neurology  [address] [facility], [facility] [address]  [place] [address]  Phone: [phone]  Fax: [phone]  Follow Up Time:     Padmini Lincoln ()  Internal Medicine  [address] [facility] [address], [address]  [place] [address]  Phone: [phone]  Fax: [phone]  Follow Up Time:     Iker Alfred)  Internal Medicine; Nephrology  [address] [place] [address]  Phone: [phone]  Fax: [phone]  Follow Up Time:    Dr. Eduardo Barber - Organ Transplant    Iker Alfred  [facility]  NEPHRO [address] [place] [address] D  Scheduled Appointment: 09/20/2022    Iker Alfred  [facility]  NEPHRO [address] [place] [address] D  Scheduled Appointment: 10/20/2022

## 2022-09-14 NOTE — PROGRESS NOTE ADULT - SUBJECTIVE AND OBJECTIVE BOX
66 yr old Male with PMHx of DM 2, HTN, CAD s/p stents/ CABG (2020), AFib on Eliquis, CVA (2019) d/t Afib, Seizure following CVA, last episode was on 4/8/22, h/o GIB (2/2022) EGD with duodenal ulcer.  ESRD due to DM2 was on HD since 2019, s/p premacath removal 5/10/22 s/p Right DDRT (4/21/22) and placed on belatacept.  Frequent hospitalization d/t weakness, refractory sz, and sepsis      seen at the bedside, for medical consultation, no new complaints,  no n/v, no sob.    Vital Signs Last 24 Hrs  T(C): 36.2 (14 Sep 2022 08:00), Max: 36.6 (13 Sep 2022 21:00)  T(F): 97.1 (14 Sep 2022 08:00), Max: 97.9 (13 Sep 2022 21:00)  HR: 57 (14 Sep 2022 08:00) (57 - 65)  BP: 152/- (14 Sep 2022 08:00) (152/- - 171/75)  BP(mean): --  RR: 15 (14 Sep 2022 08:00) (15 - 15)  SpO2: 97% (14 Sep 2022 08:00) (95% - 99%)    Parameters below as of 14 Sep 2022 08:00  Patient On (Oxygen Delivery Method): room air      GENERAL- NAD  EAR/NOSE/MOUTH/THROAT - no pharyngeal exudates, no oral lesion's  MMM  EYES- PEDRO, conjunctiva and Sclera clear  NECK- supple  RESPIRATORY-  clear to auscultation bilaterally, non laboured breathing  CARDIOVASCULAR - SIS2, RRR  GI - soft NT BS present, abdominal wall edema right flank and RLQ.   EXTREMITIES- no pedal edema  NEUROLOGY- mild LUE weakness compared to RUE  PSYCHIATRY- AAO X 3                  x                    133  | 24   | 86           x     >-----------< x       ------------------------< 94                    x                    4.0  | 94   | 5.33                                         Ca 8.5   Mg x     Ph 6.4        PT/INR - ( 14 Sep 2022 06:00 )   PT: 20.6 sec;   INR: 1.77 ratio             MEDICATIONS  (STANDING):  amLODIPine   Tablet 10 milliGRAM(s) Oral daily  artificial  tears Solution 2 Drop(s) Both EYES every 6 hours  brivaracetam 50 milliGRAM(s) Oral once  brivaracetam 50 milliGRAM(s) Oral two times a day  carvedilol 12.5 milliGRAM(s) Oral every 12 hours  chlorhexidine 2% Cloths 1 Application(s) Topical <User Schedule>  doxazosin 4 milliGRAM(s) Oral <User Schedule>  epoetin cortney-epbx (RETACRIT) Injectable 81942 Unit(s) IV Push <User Schedule>  finasteride 5 milliGRAM(s) Oral daily  furosemide    Tablet 80 milliGRAM(s) Oral daily  glucagon  Injectable 1 milliGRAM(s) IntraMuscular once  insulin glargine Injectable (LANTUS) 10 Unit(s) SubCutaneous at bedtime  insulin lispro (ADMELOG) corrective regimen sliding scale   SubCutaneous three times a day before meals  insulin lispro (ADMELOG) corrective regimen sliding scale   SubCutaneous at bedtime  levothyroxine 50 MICROGram(s) Oral daily  melatonin 6 milliGRAM(s) Oral at bedtime  multivitamin 1 Tablet(s) Oral daily  mycophenolate mofetil 500 milliGRAM(s) Oral <User Schedule>  nystatin    Suspension 913330 Unit(s) Oral four times a day  pantoprazole    Tablet 40 milliGRAM(s) Oral <User Schedule>  phenytoin   Capsule 200 milliGRAM(s) Oral two times a day  predniSONE   Tablet 10 milliGRAM(s) Oral daily  sirolimus 7 milliGRAM(s) Oral <User Schedule>  trimethoprim   80 mG/sulfamethoxazole 400 mG 1 Tablet(s) Oral daily  valGANciclovir 450 milliGRAM(s) Oral <User Schedule>  warfarin 7 milliGRAM(s) Oral once    MEDICATIONS  (PRN):  acetaminophen     Tablet .. 650 milliGRAM(s) Oral every 6 hours PRN Temp greater or equal to 38C (100.4F), Mild Pain (1 - 3)  dextrose Oral Gel 15 Gram(s) Oral once PRN Blood Glucose LESS THAN 70 milliGRAM(s)/deciliter  polyethylene glycol 3350 17 Gram(s) Oral daily PRN Constipation

## 2022-09-14 NOTE — PROGRESS NOTE ADULT - ASSESSMENT
66 yr old Male with PMHx of DM 2, HTN, CAD s/p stents/ CABG (2020), AFib on Eliquis, CVA (2019) d/t Afib, Seizure following CVA, last episode was on 4/8/22, h/o GIB (2/2022) EGD with duodenal ulcer.  ESRD due to DM was on HD since 2019, s/p premacath removal 5/10/22 s/p Right DDRT (4/21/22) and placed on belatacept.  Frequent hospitalization d/t weakness, refractory sz, and sepsis.  Admitted and intubated 6/10, found to large pleural effusion s/p thoracentesis ~1.3 and VATS of thoracic 2.2 L.  NOW ADMITTED FOR REHAB- PT/OT/DVT PPX    # Sepsis  - recurrent UTI treated with abx  - pleural effusion s/p thoracentesis 1.3L   - hemothorax s/p VATS 2.2 L  - BCx (8/14) negative    #RLQ cellulitis   - complete course of keflex 8/20 - 9/4  - MONITOR    #Seizure  - on Phenytoin  - Noted on EEG, +Brivaracetam (8/16)  - Brivaracetam post each HD session  - seizure precaution    #ESRD was on HD til 4/29/2022 s/p DDRT  - restarted on HD (7/29)  - 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  Belatacept d/t 8/1 and was started on Sirolimus 7mg QD  - Prednisone   - Cellcept hold  - S/p perma cath placement 8/30/2022  - PPx medications: Nystatin, Bactrim, Valcyte (MWF)  - Nephrology consulT    #Anemia of chronic disease  - S/P  6U PRBC and 2 U FFP during acute hospital   - monitor H/H    #HTN  #Afib  - Eliquis switched to Coumadin  - INR NOTED,   - DOSE COUMADIN DAILY  - Coreg     #Diabetes  - A1C  - accuchecks noted  - HOME MED Lantus AND NOVOLOG PREMEAL TID  -lantus 10, premeal 4 u tid  - FS + ISS    #Hypothyroidism  - Synthroid    #Pain control  - Tylenol PRN    #sacral DU-  - manage per wound care and primary team    #DVT   - acute DVT to R IJ thrombosis, Superficial vein thrombus to right basilic and cephalic vein  - Coumadin  - SCDs, TEDs     will follow  d/w dr. joiner

## 2022-09-15 NOTE — PROGRESS NOTE ADULT - SUBJECTIVE AND OBJECTIVE BOX
No distress    Vital Signs Last 24 Hrs  T(C): 36.4 (09-15-22 @ 19:56), Max: 36.8 (09-15-22 @ 17:55)  T(F): 97.6 (09-15-22 @ 19:56), Max: 98.3 (09-15-22 @ 17:55)  HR: 65 (09-15-22 @ 19:56) (64 - 65)  BP: 153/60 (09-15-22 @ 19:56) (146/67 - 166/72)  RR: 16 (09-15-22 @ 19:56) (16 - 16)  SpO2: 97% (09-15-22 @ 19:56) (97% - 98%)    I&O's Detail    14 Sep 2022 07:01  -  15 Sep 2022 07:00  --------------------------------------------------------  IN:    Other (mL): 800 mL  Total IN: 800 mL    OUT:    Other (mL): 3300 mL    Voided (mL): 0 mL  Total OUT: 3300 mL    Total NET: -2500 mL    s1s2  b/l air entry  soft, ND  no edema                        8.3    6.27  )-----------( 356      ( 14 Sep 2022 14:15 )             26.4     14 Sep 2022 06:00    133    |  94     |  86     ----------------------------<  94     4.0     |  24     |  5.33     Ca    8.5        14 Sep 2022 06:00  Phos  4.8       15 Sep 2022 06:28  Mg     2.0       15 Sep 2022 06:28    TPro  x      /  Alb  1.9    /  TBili  x      /  DBili  x      /  AST  x      /  ALT  x      /  AlkPhos  x      14 Sep 2022 06:00    LIVER FUNCTIONS - ( 14 Sep 2022 06:00 )  Alb: 1.9 g/dL / Pro: x     / ALK PHOS: x     / ALT: x     / AST: x     / GGT: x           PT/INR - ( 15 Sep 2022 06:28 )   PT: 22.2 sec;   INR: 1.90 ratio      acetaminophen     Tablet .. 650 milliGRAM(s) Oral every 6 hours PRN  amLODIPine   Tablet 10 milliGRAM(s) Oral daily  artificial  tears Solution 2 Drop(s) Both EYES every 6 hours  brivaracetam 50 milliGRAM(s) Oral two times a day  carvedilol 12.5 milliGRAM(s) Oral every 12 hours  chlorhexidine 2% Cloths 1 Application(s) Topical <User Schedule>  dextrose 5%. 1000 milliLiter(s) IV Continuous <Continuous>  dextrose 5%. 1000 milliLiter(s) IV Continuous <Continuous>  dextrose 50% Injectable 25 Gram(s) IV Push once  dextrose 50% Injectable 12.5 Gram(s) IV Push once  dextrose 50% Injectable 25 Gram(s) IV Push once  dextrose Oral Gel 15 Gram(s) Oral once PRN  doxazosin 4 milliGRAM(s) Oral <User Schedule>  epoetin cortney-epbx (RETACRIT) Injectable 45485 Unit(s) IV Push <User Schedule>  finasteride 5 milliGRAM(s) Oral daily  furosemide    Tablet 80 milliGRAM(s) Oral daily  glucagon  Injectable 1 milliGRAM(s) IntraMuscular once  insulin glargine Injectable (LANTUS) 10 Unit(s) SubCutaneous at bedtime  insulin lispro (ADMELOG) corrective regimen sliding scale   SubCutaneous three times a day before meals  insulin lispro (ADMELOG) corrective regimen sliding scale   SubCutaneous at bedtime  levothyroxine 50 MICROGram(s) Oral daily  melatonin 6 milliGRAM(s) Oral at bedtime  multivitamin 1 Tablet(s) Oral daily  mycophenolate mofetil 500 milliGRAM(s) Oral <User Schedule>  nystatin    Suspension 664793 Unit(s) Oral four times a day  pantoprazole    Tablet 40 milliGRAM(s) Oral <User Schedule>  phenytoin   Capsule 200 milliGRAM(s) Oral two times a day  polyethylene glycol 3350 17 Gram(s) Oral daily PRN  predniSONE   Tablet 10 milliGRAM(s) Oral daily  sevelamer carbonate 800 milliGRAM(s) Oral three times a day with meals  sirolimus 7 milliGRAM(s) Oral <User Schedule>  trimethoprim   80 mG/sulfamethoxazole 400 mG 1 Tablet(s) Oral daily  valGANciclovir 450 milliGRAM(s) Oral <User Schedule>  warfarin 7.5 milliGRAM(s) Oral once    A/P:    S/p complicated hospital course as per HPI  Now in [place]  S/p DDRT 4/21/22, required HD, came off HD since 5/5/22, back on HD since 7/27/22  Will continue transplant meds per transplant team recommendations  Pt w/some UO  Avoid nephrotoxins  Epoetin w/HD 3 x week  Renal diet  Bld work w/each HD   Next HD tomorrow    [phone]

## 2022-09-15 NOTE — PROGRESS NOTE ADULT - SUBJECTIVE AND OBJECTIVE BOX
SUBJECTIVE/ROS:  Patient seen and evaluated during speech therapy (in speech office)  In therapy - but is tired, but states his energy is better - likely just deconditioned and tired easily? Hard to focus.  Decreased memory and needs lots of reminders to complete task  Patient c/o not making urine, PVR was minimal to none according to RN, likely oliguric given renal status + HD.  Unable to obtain UA  c/o difficulty with voiding, but no dysuria - will monitor PVR but hard because he's incontinence  Slept well overnight  Denies CP, palpitation, SOB, or cough.  need constant reenforcement to do deep breathing exercises  no nausea or abd pain.  LBM ?     Vital Signs Last 24 Hrs  T(C): 36.4 (22 @ 21:00), Max: 36.6 (22 @ 14:05)  T(F): 97.5 (22 @ 21:00), Max: 97.9 (22 @ 14:05)  HR: 64 (09-15-22 @ 05:10) (57 - 68)  BP: 166/72 (09-15-22 @ 05:10) (142/69 - 169/69)  RR: 16 (09-15-22 @ 05:10) (16 - 16)  SpO2: 98% (09-15-22 @ 05:10) (97% - 99%) on RA      Daily -  Admission weight () 69.8 kg   Daily Weight in k.4 (15 Sep 2022 05:10)    Gen - NAD, comfortable   HEENT - NCAT, EOMI, MMM  Neck - Supple, No limited ROM  Pulm - resp non labored  Cardiovascular - warm and well perfused  Chest - left chest wall permcath  Abdomen -  semi tympanic, NT, +BS, +lateral RLQ discoloration  Extremities - No Cyanosis, no clubbing, minimal edema to b/l ankles, no calf tenderness  Neuro-     Cognitive - awake, alert, oriented to person and place. Delayed processing/response     Communication - Fluent     Cranial Nerves - CN 2-12 intact. No facial asymmetry, Tongue midline, EOMI, Shoulder shrug intact     Motor -                     LEFT    UE - 3+5                    RIGHT UE - 3-/5                    LEFT    LE - 3+/5                    RIGHT LE - 3-/5        Sensory - Intact to LT bilaterally  MSK: generalized weakness  Skin:  stage 2 pressure ulcer to sacrum 0.3 x 0.2,  discoloration to lateral RLQ of abdomen    RECENT LABS:   LAB                        8.3    6.27  )-----------( 356      ( 14 Sep 2022 14:15 )             26.4     09-14    133<L>  |  94<L>  |  86<H>  ----------------------------<  94  4.0   |  24  |  5.33<H>    Ca    8.5      14 Sep 2022 06:00  Phos  4.8     09-15  Mg     2.0     09-15    TPro  x   /  Alb  1.9<L>  /  TBili  x   /  DBili  x   /  AST  x   /  ALT  x   /  AlkPhos  x       LIVER FUNCTIONS - ( 14 Sep 2022 06:00 )  Alb: 1.9 g/dL / Pro: x     / ALK PHOS: x     / ALT: x     / AST: x     / GGT: x           PT/INR - ( 15 Sep 2022 06:28 )   PT: 22.2 sec;   INR: 1.90 ratio         CAPILLARY BLOOD GLUCOSE  POCT Blood Glucose.: 123 mg/dL (15 Sep 2022 07:26)  POCT Blood Glucose.: 235 mg/dL (14 Sep 2022 21:32)  POCT Blood Glucose.: 147 mg/dL (14 Sep 2022 17:38)  POCT Blood Glucose.: 167 mg/dL (14 Sep 2022 11:27)      Xray Chest 1 View- PORTABLE-Routine (Xray Chest 1 View- PORTABLE-Routine .) (22 @ 12:04) >  Interval exchange of the dialysis catheter. No evidence of pneumothorax.   No other significant change.    Allergies  No Known Allergies    MEDICATIONS  (STANDING):  amLODIPine   Tablet 10 milliGRAM(s) Oral daily  artificial  tears Solution 2 Drop(s) Both EYES every 6 hours  brivaracetam 50 milliGRAM(s) Oral two times a day  carvedilol 12.5 milliGRAM(s) Oral every 12 hours  chlorhexidine 2% Cloths 1 Application(s) Topical <User Schedule>  dextrose 5%. 1000 milliLiter(s) (50 mL/Hr) IV Continuous <Continuous>  dextrose 5%. 1000 milliLiter(s) (100 mL/Hr) IV Continuous <Continuous>  dextrose 50% Injectable 25 Gram(s) IV Push once  dextrose 50% Injectable 12.5 Gram(s) IV Push once  dextrose 50% Injectable 25 Gram(s) IV Push once  doxazosin 4 milliGRAM(s) Oral <User Schedule>  epoetin cortney-epbx (RETACRIT) Injectable 13630 Unit(s) IV Push <User Schedule>  finasteride 5 milliGRAM(s) Oral daily  furosemide    Tablet 80 milliGRAM(s) Oral daily  glucagon  Injectable 1 milliGRAM(s) IntraMuscular once  insulin glargine Injectable (LANTUS) 10 Unit(s) SubCutaneous at bedtime  insulin lispro (ADMELOG) corrective regimen sliding scale   SubCutaneous three times a day before meals  insulin lispro (ADMELOG) corrective regimen sliding scale   SubCutaneous at bedtime  levothyroxine 50 MICROGram(s) Oral daily  melatonin 6 milliGRAM(s) Oral at bedtime  multivitamin 1 Tablet(s) Oral daily  mycophenolate mofetil 500 milliGRAM(s) Oral <User Schedule>  nystatin    Suspension 946698 Unit(s) Oral four times a day  pantoprazole    Tablet 40 milliGRAM(s) Oral <User Schedule>  phenytoin   Capsule 200 milliGRAM(s) Oral two times a day  predniSONE   Tablet 10 milliGRAM(s) Oral daily  sevelamer carbonate 800 milliGRAM(s) Oral three times a day with meals  sirolimus 7 milliGRAM(s) Oral <User Schedule>  trimethoprim   80 mG/sulfamethoxazole 400 mG 1 Tablet(s) Oral daily  valGANciclovir 450 milliGRAM(s) Oral <User Schedule>  warfarin 7.5 milliGRAM(s) Oral once    MEDICATIONS  (PRN):  acetaminophen     Tablet .. 650 milliGRAM(s) Oral every 6 hours PRN Temp greater or equal to 38C (100.4F), Mild Pain (1 - 3)  dextrose Oral Gel 15 Gram(s) Oral once PRN Blood Glucose LESS THAN 70 milliGRAM(s)/deciliter  polyethylene glycol 3350 17 Gram(s) Oral daily PRN Constipation SUBJECTIVE/ROS:  Patient seen and evaluated during speech therapy (in speech office)  In therapy - but is tired, but states his energy is better - likely just deconditioned and tired easily? Hard to focus.  Decreased memory and needs lots of reminders to complete task  Patient c/o not making urine, PVR was minimal to none according to RN, likely oliguric given renal status + HD.  Unable to obtain UA  c/o difficulty with voiding, but no dysuria - will monitor PVR but hard because he's incontinence  Slept well overnight  Denies CP, palpitation, SOB, or cough.  need constant reenforcement to do deep breathing exercises  no nausea or abd pain.  LBM      Vital Signs Last 24 Hrs  T(C): 36.4 (22 @ 21:00), Max: 36.6 (22 @ 14:05)  T(F): 97.5 (22 @ 21:00), Max: 97.9 (22 @ 14:05)  HR: 64 (09-15-22 @ 05:10) (57 - 68)  BP: 166/72 (09-15-22 @ 05:10) (142/69 - 169/69)  RR: 16 (09-15-22 @ 05:10) (16 - 16)  SpO2: 98% (09-15-22 @ 05:10) (97% - 99%) on RA    Observed in therapy--ambulating with walker, with mod assistance   Engaging in SLP, still requiring constant reminder during tasts  Transplant team recs for labs noted, patient anuric, unable to give sample for UA    Daily -  Admission weight () 69.8 kg   Daily Weight in k.4 (15 Sep 2022 05:10)    Gen - NAD, comfortable   HEENT - NCAT, EOMI, MMM  Neck - Supple, No limited ROM  Pulm - resp non labored  Cardiovascular - warm and well perfused  Chest - left chest wall permcath  Abdomen -  semi tympanic, NT, +BS, +lateral RLQ discoloration  Extremities - No Cyanosis, no clubbing, minimal edema to b/l ankles, no calf tenderness  Neuro-     Cognitive - awake, alert, oriented to person and place. Delayed processing/response     Communication - Fluent     Cranial Nerves - CN 2-12 intact. No facial asymmetry, Tongue midline, EOMI, Shoulder shrug intact     Motor -                     LEFT    UE - 3+5                    RIGHT UE - 3-/5                    LEFT    LE - 3+/5                    RIGHT LE - 3-/5        Sensory - Intact to LT bilaterally  MSK: generalized weakness  Skin:  stage 2 pressure ulcer to sacrum 0.3 x 0.2,  discoloration to lateral RLQ of abdomen    RECENT LABS:   LAB                        8.3    6.27  )-----------( 356      ( 14 Sep 2022 14:15 )             26.4         133<L>  |  94<L>  |  86<H>  ----------------------------<  94  4.0   |  24  |  5.33<H>    Ca    8.5      14 Sep 2022 06:00  Phos  4.8     09-15  Mg     2.0     09-15    TPro  x   /  Alb  1.9<L>  /  TBili  x   /  DBili  x   /  AST  x   /  ALT  x   /  AlkPhos  x       LIVER FUNCTIONS - ( 14 Sep 2022 06:00 )  Alb: 1.9 g/dL / Pro: x     / ALK PHOS: x     / ALT: x     / AST: x     / GGT: x           PT/INR - ( 15 Sep 2022 06:28 )   PT: 22.2 sec;   INR: 1.90 ratio         CAPILLARY BLOOD GLUCOSE  POCT Blood Glucose.: 123 mg/dL (15 Sep 2022 07:26)  POCT Blood Glucose.: 235 mg/dL (14 Sep 2022 21:32)  POCT Blood Glucose.: 147 mg/dL (14 Sep 2022 17:38)  POCT Blood Glucose.: 167 mg/dL (14 Sep 2022 11:27)      Xray Chest 1 View- PORTABLE-Routine (Xray Chest 1 View- PORTABLE-Routine .) (22 @ 12:04) >  Interval exchange of the dialysis catheter. No evidence of pneumothorax.   No other significant change.    Allergies  No Known Allergies    MEDICATIONS  (STANDING):  amLODIPine   Tablet 10 milliGRAM(s) Oral daily  artificial  tears Solution 2 Drop(s) Both EYES every 6 hours  brivaracetam 50 milliGRAM(s) Oral two times a day  carvedilol 12.5 milliGRAM(s) Oral every 12 hours  chlorhexidine 2% Cloths 1 Application(s) Topical <User Schedule>  dextrose 5%. 1000 milliLiter(s) (50 mL/Hr) IV Continuous <Continuous>  dextrose 5%. 1000 milliLiter(s) (100 mL/Hr) IV Continuous <Continuous>  dextrose 50% Injectable 25 Gram(s) IV Push once  dextrose 50% Injectable 12.5 Gram(s) IV Push once  dextrose 50% Injectable 25 Gram(s) IV Push once  doxazosin 4 milliGRAM(s) Oral <User Schedule>  epoetin cortney-epbx (RETACRIT) Injectable 37476 Unit(s) IV Push <User Schedule>  finasteride 5 milliGRAM(s) Oral daily  furosemide    Tablet 80 milliGRAM(s) Oral daily  glucagon  Injectable 1 milliGRAM(s) IntraMuscular once  insulin glargine Injectable (LANTUS) 10 Unit(s) SubCutaneous at bedtime  insulin lispro (ADMELOG) corrective regimen sliding scale   SubCutaneous three times a day before meals  insulin lispro (ADMELOG) corrective regimen sliding scale   SubCutaneous at bedtime  levothyroxine 50 MICROGram(s) Oral daily  melatonin 6 milliGRAM(s) Oral at bedtime  multivitamin 1 Tablet(s) Oral daily  mycophenolate mofetil 500 milliGRAM(s) Oral <User Schedule>  nystatin    Suspension 900884 Unit(s) Oral four times a day  pantoprazole    Tablet 40 milliGRAM(s) Oral <User Schedule>  phenytoin   Capsule 200 milliGRAM(s) Oral two times a day  predniSONE   Tablet 10 milliGRAM(s) Oral daily  sevelamer carbonate 800 milliGRAM(s) Oral three times a day with meals  sirolimus 7 milliGRAM(s) Oral <User Schedule>  trimethoprim   80 mG/sulfamethoxazole 400 mG 1 Tablet(s) Oral daily  valGANciclovir 450 milliGRAM(s) Oral <User Schedule>  warfarin 7.5 milliGRAM(s) Oral once    MEDICATIONS  (PRN):  acetaminophen     Tablet .. 650 milliGRAM(s) Oral every 6 hours PRN Temp greater or equal to 38C (100.4F), Mild Pain (1 - 3)  dextrose Oral Gel 15 Gram(s) Oral once PRN Blood Glucose LESS THAN 70 milliGRAM(s)/deciliter  polyethylene glycol 3350 17 Gram(s) Oral daily PRN Constipation

## 2022-09-15 NOTE — PROGRESS NOTE ADULT - ASSESSMENT
66 yr old Male with PMHx of DM type II, HTN, CAD s/p stents/ CABG (2020), AFib on Eliquis, CVA (2019) d/t Afib, Seizure d/o following CVA, last episode was on 4/8/22, h/o GIB (2/2022) EGD with duodenal ulcer.  ESRD due to DM was on HD since 2019, s/p premacath removal 5/10/22 s/p Right DDRT (4/21/22) and placed on belatacept.  Frequent hospitalization d/t weakness, refractory sz, and sepsis.  Admitted and intubated 6/10, found to large pleural effusion s/p thoracentesis ~1.3 and VATS of thoracic 2.2 L.      * INR 1.90 (9/15) - coumadin 7.5mg for today  * transplant team PHONE  --Iker Alfred (MD) ??  Dr. Eduardo Barber (transplant surgeon) - Darleen Dior NP [phone]*  * Barby NP - recommendations for labs: BK virus DNA, lactate, mag, parathyroid, phosphorus, CRP, uric acid, and UA.  CMV pcr. Pending labs for tomorrow and some with HD.  UA unable to obtain because of oliguria.  * HD M/W/F - need additional brivaracetam after HD  * PVR low - oliguria    COMORBIDITES/ACTIVE MEDICAL ISSUES   Gait Instability, ADL impairments and Functional impairments secondary to recurrent UTI, status epilepticus, with debility seizures. 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  Belatacept d/t 8/1 and was started on Sirolimus 7mg QD  - Prednisone 10  - Cellcept 250 BID on hold  - S/p perma cath placement 8/30/2022  - PPx medications: Nystatin, Bactrim, Valcyte (MWF)  - Nephrology following - T/T/Sat -> had HD 9/12, now modified to MWF    #Anemia of chronic disease  - Has gotten total of 6U PRBC and 2 U FFP during acute hospital   - monitor H/H 8.0 (9/12) - 8.3 (9/14)    #HTN  #Afib  - Eliquis switched to Coumadin  - Coreg   - INR 1.90 (9/12) + Coumadin dose- 7.5mg for 9/15    #Diabetes  - A1C  - Lantus 3U -> inc 10 U  - standing Admelog dc, just ISS  - FS + ISS  - Hospitalist following     #Hypothyroidism  - Synthroid 37.5 mcg    #Pain control  - Tylenol PRN    #GI/Bowel Mgmt   - At risk for constipation due to neurologic diagnosis, immobility and/or medication use  - Senna d/c d/t loose BM 9/11  -  Miralax PRN    #Bladder management  - incontinence     #DVT   - acute DVT to R IJ thrombosis, Superficial vein thrombus to right basilic and cephalic vein  - Coumadin  - SCDs, TEDs     #Skin:  -Stage 2 pressure ulcer to sacrum 0.3 x 0.2- cleanse with NS, apply wet gauze andcover with allevyn foam dressing  -At risk for pressure injury due to neurologic diagnosis and relative immobility  -Bilateral heels - soft heel protectors, apply liquid barrier film daily and monitor for tissue type changes  - Turn Q2 hr while in bed, air mattress  - Skin barrier cream as needed  - Nursing to monitor skin Qshift    Diet  - Regular/easy to chew + Thins  [[facility], renal diet]    + Fluid Restriction: 1L    Precautions / PROPHYLAXIS:   - Falls, Cardiac, Seizure   - ortho: Weight bearing status: WBAT   - Lungs: Aspiration, Incentive Spirometer   - Pressure injury/Skin: Turn Q2hrs while in bed, OOB to Chair, PT/OT      liaison with family 9/12--Spoke with son Dr Perez  He gave update on patient's clinical state, investigation results and function prior to acute rehab treatment   I gave update on patients current status, and he was happy with same  Plan to to give interval updates to family as needed    IDT 9/12  SW: lives with spouse and 2 sons in  with 6 [address], 1st [place] setup.  PTA needed some assistance with ADLs  NSG: total A/incont  + stage 2 to sacrum  Functional status: max A with ADLs except mod A for stand-pivot with standby, transferring with bert (likely upgrade to jena maxpercy soon), decreased core strength.  SLP: currrently on easy to chew d/t dentures, has severe cog deficit, mild dysarthria, sequence ADLs with min cues  Goals: Close SV with ADLs, CG with functional transfer/amb/stair (but min-mod A more realistically)  Barriers: decreased safety, atrophy d/t deconditioning  TDD: 9/29 home    Follow ups:  Andre Patterson)  Clinical Neurophysiology; EEGEpilepsy; Neurology  [address], [address]  [place] [address]  Phone: [phone]  Fax: [phone]  Follow Up Time:     Padmini Lincoln ()  Internal Medicine  [address], [address]  [place] [address]  Phone: [phone]  Fax: [phone]  Follow Up Time:     Iker Alfred)  Internal Medicine; Nephrology  [address] [place] [address]  Phone: [phone]  Fax: [phone]  Follow Up Time:    Dr. Eduardo Barber - Organ Transplant    Iker Alfred  [facility] the [facility]  [address] [facility] [address]  Scheduled Appointment: 09/20/2022    Iker Alfred  [facility]  [address] [facility] [address]  Scheduled Appointment: 10/20/2022

## 2022-09-16 NOTE — PROGRESS NOTE ADULT - SUBJECTIVE AND OBJECTIVE BOX
Seen on HD    Vital Signs Last 24 Hrs  T(C): 36.5 (09-16-22 @ 08:48), Max: 36.8 (09-15-22 @ 17:55)  T(F): 97.7 (09-16-22 @ 08:48), Max: 98.3 (09-15-22 @ 17:55)  HR: 63 (09-16-22 @ 08:48) (63 - 65)  BP: 154/68 (09-16-22 @ 08:48) (146/67 - 159/71)  RR: 16 (09-16-22 @ 08:48) (16 - 16)  SpO2: 94% (09-16-22 @ 08:48) (94% - 98%)    s1s2  b/l air entry  soft, ND  no edema                                 8.0    5.85  )-----------( 308      ( 16 Sep 2022 14:30 )             25.9     16 Sep 2022 14:30    130    |  93     |  93     ----------------------------<  200    4.2     |  25     |  5.65     Ca    8.4        16 Sep 2022 14:30  Phos  4.8       15 Sep 2022 06:28  Mg     2.0       15 Sep 2022 06:28    TPro  6.7    /  Alb  1.9    /  TBili  0.4    /  DBili  x      /  AST  27     /  ALT  41     /  AlkPhos  582    16 Sep 2022 14:30    LIVER FUNCTIONS - ( 16 Sep 2022 14:30 )  Alb: 1.9 g/dL / Pro: 6.7 g/dL / ALK PHOS: 582 U/L / ALT: 41 U/L / AST: 27 U/L / GGT: x           PT/INR - ( 16 Sep 2022 06:00 )   PT: 24.5 sec;   INR: 2.10 ratio      acetaminophen     Tablet .. 650 milliGRAM(s) Oral every 6 hours PRN  amLODIPine   Tablet 10 milliGRAM(s) Oral daily  artificial  tears Solution 2 Drop(s) Both EYES every 6 hours  brivaracetam 50 milliGRAM(s) Oral once  brivaracetam 50 milliGRAM(s) Oral two times a day  carvedilol 12.5 milliGRAM(s) Oral every 12 hours  chlorhexidine 2% Cloths 1 Application(s) Topical <User Schedule>  dextrose 5%. 1000 milliLiter(s) IV Continuous <Continuous>  dextrose 5%. 1000 milliLiter(s) IV Continuous <Continuous>  dextrose 50% Injectable 25 Gram(s) IV Push once  dextrose 50% Injectable 12.5 Gram(s) IV Push once  dextrose 50% Injectable 25 Gram(s) IV Push once  dextrose Oral Gel 15 Gram(s) Oral once PRN  doxazosin 4 milliGRAM(s) Oral <User Schedule>  epoetin cortney-epbx (RETACRIT) Injectable 58690 Unit(s) IV Push <User Schedule>  finasteride 5 milliGRAM(s) Oral daily  furosemide    Tablet 80 milliGRAM(s) Oral daily  glucagon  Injectable 1 milliGRAM(s) IntraMuscular once  insulin glargine Injectable (LANTUS) 10 Unit(s) SubCutaneous at bedtime  insulin lispro (ADMELOG) corrective regimen sliding scale   SubCutaneous three times a day before meals  insulin lispro (ADMELOG) corrective regimen sliding scale   SubCutaneous at bedtime  insulin lispro Injectable (ADMELOG) 3 Unit(s) SubCutaneous three times a day before meals  levothyroxine 50 MICROGram(s) Oral daily  melatonin 6 milliGRAM(s) Oral at bedtime  multivitamin 1 Tablet(s) Oral daily  mycophenolate mofetil 500 milliGRAM(s) Oral <User Schedule>  nystatin    Suspension 562238 Unit(s) Oral four times a day  pantoprazole    Tablet 40 milliGRAM(s) Oral <User Schedule>  phenytoin   Capsule 200 milliGRAM(s) Oral two times a day  polyethylene glycol 3350 17 Gram(s) Oral daily PRN  predniSONE   Tablet 10 milliGRAM(s) Oral daily  sevelamer carbonate 800 milliGRAM(s) Oral three times a day with meals  sirolimus 7 milliGRAM(s) Oral <User Schedule>  trimethoprim   80 mG/sulfamethoxazole 400 mG 1 Tablet(s) Oral daily  valGANciclovir 450 milliGRAM(s) Oral <User Schedule>  warfarin 7.5 milliGRAM(s) Oral once    A/P:    S/p complicated hospital course as per HPI  Now in [place]  S/p DDRT 4/21/22, required HD, came off HD since 5/5/22, back on HD since 7/27/22  Will continue transplant meds per transplant team recommendations  Pt w/some UO  Avoid nephrotoxins  HD MWF  Epoetin w/HD 3 x week  Renal diet  Bld work w/each HD   Rehab    [phone]

## 2022-09-16 NOTE — PROGRESS NOTE ADULT - ASSESSMENT
66 yr old Male with PMHx of DM type II, HTN, CAD s/p stents/ CABG (2020), AFib on Eliquis, CVA (2019) d/t Afib, Seizure d/o following CVA, last episode was on 4/8/22, h/o GIB (2/2022) EGD with duodenal ulcer.  ESRD due to DM was on HD since 2019, s/p premacath removal 5/10/22 s/p Right DDRT (4/21/22) and placed on belatacept.  Frequent hospitalization d/t weakness, refractory sz, and sepsis.  Admitted and intubated 6/10, found to large pleural effusion s/p thoracentesis ~1.3 and VATS of thoracic 2.2 L.      * INR 2.10 (9/16) - coumadin 7.5mg for today  * transplant team PHONE  --Iker Alfred (MD) ??  Dr. Eduardo Barber (transplant surgeon) - Darleen Dior NP [phone]*  * Barby NP - recommendations for labs: BK virus DNA (pending 9/15), lactate, mag, parathyroid (202), phosphorus (2.8), CRP (230), uric acid (3.2), and UA (oliguria).  CMV pcr (pending 9/15)  * HD M/W/F - need additional brivaracetam after HD  *** therapy changes: d/c SLP, 90 PT, 90 OT      COMORBIDITES/ACTIVE MEDICAL ISSUES   Gait Instability, ADL impairments and Functional impairments secondary to recurrent UTI, status epilepticus, with debility seizures. 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  Belatacept d/t 8/1 and was started on Sirolimus 7mg QD  - Prednisone 10  - Cellcept 250 BID on hold  - S/p perma cath placement 8/30/2022  - PPx medications: Nystatin, Bactrim, Valcyte (MWF)  - Nephrology following - T/T/Sat -> had HD 9/12, now modified to [facility]    #Anemia of chronic disease  - Has gotten total of 6U PRBC and 2 U FFP during acute hospital   - monitor H/H 8.0 (9/12) - 8.3 (9/14)    #HTN  #Afib  - Eliquis switched to Coumadin  - Coreg   - INR 2.10 (9/12) + Coumadin dose- 7.5mg for 9/16    #Diabetes  - A1C  - Lantus 3U -> inc 10 U  - standing Admelog dc, restarted 9/16 @ 3U with meals  - FS + ISS  - Hospitalist following     #Hypothyroidism  - Synthroid 37.5 mcg    #Pain control  - Tylenol PRN    #GI/Bowel Mgmt   - At risk for constipation due to neurologic diagnosis, immobility and/or medication use  - Senna d/c d/t loose BM 9/11  -  Miralax PRN    #Bladder management  - incontinence     #DVT   - acute DVT to R IJ thrombosis, Superficial vein thrombus to right basilic and cephalic vein  - Coumadin  - SCDs, TEDs     #Skin:  -Stage 2 pressure ulcer to sacrum 0.3 x 0.2- cleanse with NS, apply wet gauze andcover with allevyn foam dressing  -At risk for pressure injury due to neurologic diagnosis and relative immobility  -Bilateral heels - soft heel protectors, apply liquid barrier film daily and monitor for tissue type changes  - Turn Q2 hr while in bed, air mattress  - Skin barrier cream as needed  - Nursing to monitor skin Qshift    Diet  - Regular/easy to chew + Thins  [[facility]HO, renal diet]    + Fluid Restriction: 1L    Precautions / PROPHYLAXIS:   - Falls, Cardiac, Seizure   - ortho: Weight bearing status: WBAT   - Lungs: Aspiration, Incentive Spirometer   - Pressure injury/Skin: Turn Q2hrs while in bed, OOB to Chair, PT/OT      liaison with family 9/12--Spoke with son Dr Perez  He gave update on patient's clinical state, investigation results and function prior to acute rehab treatment   I gave update on patients current status, and he was happy with same  Plan to to give interval updates to family as needed    IDT 9/12  SW: lives with spouse and 2 sons in  with 6 JAR[place]TT, 1st [place] setup.  PTA needed some assistance with ADLs  NSG: total A/incont  + stage 2 to sacrum  Functional status: max A with ADLs except mod A for stand-pivot with standby, transferring with bert (likely upgrade to jena dunaway soon), decreased core strength.  SLP: currrently on easy to chew d/t dentures, has severe cog deficit, mild dysarthria, sequence ADLs with min cues  Goals: Close SV with ADLs, CG with functional transfer/amb/stair (but min-mod A more realistically)  Barriers: decreased safety, atrophy d/t deconditioning  TDD: 9/29 home    Follow ups:  Andre Patterson)  Clinical Neurophysiology; EEGEpilepsy; Neurology  [address], [address]  [place] [address]  Phone: [phone]  Fax: [phone]  Follow Up Time:     Padmini Lincoln ()  Internal Medicine  [address], [address]  [place] [address]  Phone: [phone]  Fax: [phone]  Follow Up Time:     Iker Alfred)  Internal Medicine; Nephrology  [address] [place] [address]  Phone: [phone]  Fax: [phone]  Follow Up Time:    Dr. Eduardo Barber - Organ Transplant    Iker Alfred  [facility]  NEPHRO [address] [place] [address] D  Scheduled Appointment: 09/20/2022    Iker Alfred  [facility]  NEPH[address]  Scheduled Appointment: 10/20/2022     66 yr old Male with PMHx of DM type II, HTN, CAD s/p stents/ CABG (2020), AFib on Eliquis, CVA (2019) d/t Afib, Seizure d/o following CVA, last episode was on 4/8/22, h/o GIB (2/2022) EGD with duodenal ulcer.  ESRD due to DM was on HD since 2019, s/p premacath removal 5/10/22 s/p Right DDRT (4/21/22) and placed on belatacept.  Frequent hospitalization d/t weakness, refractory sz, and sepsis.  Admitted and intubated 6/10, found to large pleural effusion s/p thoracentesis ~1.3 and VATS of thoracic 2.2 L.      TRANSPLANT PATIENT --* Barby NP--Phone number/24HR number in Provider hand off    * INR 2.10 (9/16) - coumadin 7.5mg for today  * transplant team PHONE  --Iker Alfred (MD) ??  Dr. Eduardo Barber (transplant surgeon) - Darleen Dior NP [phone]*  * Barby NP - recommendations for labs: BK virus DNA (pending 9/15), lactate, mag, parathyroid (202), phosphorus (2.8), CRP (230), uric acid (3.2), and UA (oliguria).  CMV pcr (pending 9/15)  * HD M/W/F - need additional brivaracetam after HD  *** therapy changes: d/c SLP, 90 PT, 90 OT      COMORBIDITES/ACTIVE MEDICAL ISSUES   Gait Instability, ADL impairments and Functional impairments secondary to recurrent UTI, status epilepticus, with debility seizures. 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  Belatacept d/t 8/1 and was started on Sirolimus 7mg QD  - Prednisone 10  - Cellcept 250 BID on hold  - S/p perma cath placement 8/30/2022  - PPx medications: Nystatin, Bactrim, Valcyte (MWF)  - Nephrology following - T/T/Sat -> had HD 9/12, now modified to [facility]    #Anemia of chronic disease  - Has gotten total of 6U PRBC and 2 U FFP during acute hospital   - monitor H/H 8.0 (9/12) - 8.3 (9/14)    #HTN  #Afib  - Eliquis switched to Coumadin  - Coreg   - INR 2.10 (9/12) + Coumadin dose- 7.5mg for 9/16    #Diabetes  - A1C  - Lantus 3U -> inc 10 U  - standing Admelog dc, restarted 9/16 @ 3U with meals  - FS + ISS  - Hospitalist following     #Hypothyroidism  - Synthroid 37.5 mcg    #Pain control  - Tylenol PRN    #GI/Bowel Mgmt   - At risk for constipation due to neurologic diagnosis, immobility and/or medication use  - Senna d/c d/t loose BM 9/11  -  Miralax PRN    #Bladder management  - incontinence     #DVT   - acute DVT to R IJ thrombosis, Superficial vein thrombus to right basilic and cephalic vein  - Coumadin  - SCDs, TEDs     #Skin:  -Stage 2 pressure ulcer to sacrum 0.3 x 0.2- cleanse with NS, apply wet gauze andcover with allevyn foam dressing  -At risk for pressure injury due to neurologic diagnosis and relative immobility  -Bilateral heels - soft heel protectors, apply liquid barrier film daily and monitor for tissue type changes  - Turn Q2 hr while in bed, air mattress  - Skin barrier cream as needed  - Nursing to monitor skin Qshift    Diet  - Regular/easy to chew + Thins  [CCHO, renal diet]    + Fluid Restriction: 1L    Precautions / PROPHYLAXIS:   - Falls, Cardiac, Seizure   - ortho: Weight bearing status: WBAT   - Lungs: Aspiration, Incentive Spirometer   - Pressure injury/Skin: Turn Q2hrs while in bed, OOB to Chair, PT/OT      liaison with family 9/12--Spoke with son Dr Perez  He gave update on patient's clinical state, investigation results and function prior to acute rehab treatment   I gave update on patients current status, and he was happy with same  Plan to to give interval updates to family as needed    IDT 9/12  SW: lives with spouse and 2 sons in  with 6 [place]TT, 1st [place] setup.  PTA needed some assistance with ADLs  NSG: total A/incont  + stage 2 to sacrum  Functional status: max A with ADLs except mod A for stand-pivot with standby, transferring with bert (likely upgrade to jena dunaway soon), decreased core strength.  SLP: currrently on easy to chew d/t dentures, has severe cog deficit, mild dysarthria, sequence ADLs with min cues  Goals: Close SV with ADLs, CG with functional transfer/amb/stair (but min-mod A more realistically)  Barriers: decreased safety, atrophy d/t deconditioning  TDD: 9/29 home    Follow ups:  Andre Patterson)  Clinical Neurophysiology; EEGEpilepsy; Neurology  [address] [facility] [address], [address]  [place] [address]  Phone: [phone]  Fax: [phone]  Follow Up Time:     Padmini Lincoln ()  Internal Medicine  [address], [address]  [place] [address]  Phone: [phone]  Fax: [phone]  Follow Up Time:     Iker Alfred)  Internal Medicine; Nephrology  [address] [place] [address]  Phone: [phone]  Fax: [phone]  Follow Up Time:    Dr. Eduardo Barber - Organ Transplant    Iker Alfred  [facility]  [address]  Scheduled Appointment: 09/20/2022    Iker Alfred  [facility]  NEPH[address]  Scheduled Appointment: 10/20/2022

## 2022-09-16 NOTE — PROGRESS NOTE ADULT - ASSESSMENT
66M with DM2, HTN, CAD with stents/CABG, AF on Coumadin, hx CVA, seizure, PUD, s/p failed renal transplant now back on HD, recent hospitalization for seizures requiring intubation and course complicated by UTI, as well as need for VATS due to large hemothorax, now in rehab     #Seizure disorder: c/w phenytoin and Brivaracetam     #ESRD on HD / failed renal transplant: c/w HD as per renal, transplant team following along, c/w immunosuppression     #Anemia of chronic disease: stable    #Chronic AF on Coumadin: goal INR 2-3 (ideally closer to 2 if possible), dose Coumadin, c/w Coreg    #DM2:   A1C with Estimated Average Glucose Result: 6.4 % (09-04-22 @ 06:08)  POCT Blood Glucose.: 180 mg/dL (16 Sep 2022 07:50)  POCT Blood Glucose.: 207 mg/dL (15 Sep 2022 21:30)  POCT Blood Glucose.: 257 mg/dL (15 Sep 2022 17:09)  POCT Blood Glucose.: 234 mg/dL (15 Sep 2022 11:20)  Current insulin regimen:  insulin glargine Injectable (LANTUS)   10 Unit(s) SubCutaneous (09-15-22 @ 21:35)  insulin lispro (ADMELOG) corrective regimen sliding scale   1 Unit(s) SubCutaneous (09-16-22 @ 07:56)   3 Unit(s) SubCutaneous (09-15-22 @ 17:13)   2 Unit(s) SubCutaneous (09-15-22 @ 11:23)  Will start pre-meal insulin 3U    #Hypothyroid: c/w synthroid    #Right IJ DVT: c/w Coumadin    #Abdominal wall cellulitis: Completed course of antibiotics    #DVT ppx: Coumadin   66M with DM2, HTN, CAD with stents/CABG, AF on Coumadin, hx CVA, seizure, PUD, s/p failed renal transplant now back on HD, recent hospitalization for seizures requiring intubation and course complicated by UTI, as well as need for VATS due to large hemothorax, now in rehab     #Seizure disorder: c/w phenytoin and Brivaracetam     #ESRD on HD / failed renal transplant: c/w HD as per renal, c/w immunosuppression, c/w infectious disease prophylaxis    #Anemia of chronic disease: stable, c/w EPO    #Chronic AF on Coumadin: goal INR 2-3 (ideally closer to 2 if possible), dose Coumadin, c/w Coreg    #DM2 with steroid-induced hyperglycemia  A1C with Estimated Average Glucose Result: 6.4 % (09-04-22 @ 06:08)  POCT Blood Glucose.: 180 mg/dL (16 Sep 2022 07:50)  POCT Blood Glucose.: 207 mg/dL (15 Sep 2022 21:30)  POCT Blood Glucose.: 257 mg/dL (15 Sep 2022 17:09)  POCT Blood Glucose.: 234 mg/dL (15 Sep 2022 11:20)  Current insulin regimen:  insulin glargine Injectable (LANTUS)   10 Unit(s) SubCutaneous (09-15-22 @ 21:35)  insulin lispro (ADMELOG) corrective regimen sliding scale   1 Unit(s) SubCutaneous (09-16-22 @ 07:56)   3 Unit(s) SubCutaneous (09-15-22 @ 17:13)   2 Unit(s) SubCutaneous (09-15-22 @ 11:23)  Will start pre-meal insulin 3U    #Hypothyroid: c/w synthroid    #Right IJ DVT: c/w Coumadin    #Urinary retention: c/w Proscar and Doxazosin     #Abdominal wall cellulitis: Completed course of antibiotics    #DVT ppx: Coumadin

## 2022-09-16 NOTE — PROGRESS NOTE ADULT - SUBJECTIVE AND OBJECTIVE BOX
SUBJECTIVE/ROS:  Patient seen and evaluated during PT   Tolerating therapy - still needs significant assistance + cues.   has a posterior lean which causes imbalance. Used bert gait sling, can be upgraded to use jena dunaway with nursing staff.  Amb 20-35' mod A x1 + wc follow.  Spoke with son in regards to therapy - Patients cognition has bee unchanged x 2 years, would like to focus more on functional activities.      Slept well overnight  Denies CP, palpitation, SOB, or cough  no nausea or abd pain.  LBM 9/15     Vital Signs Last 24 Hrs  T(C): 36.5 (16 Sep 2022 08:48), Max: 36.8 (15 Sep 2022 17:55)  T(F): 97.7 (16 Sep 2022 08:48), Max: 98.3 (15 Sep 2022 17:55)  HR: 63 (16 Sep 2022 08:48) (63 - 65)  BP: 154/68 (16 Sep 2022 08:48) (146/67 - 159/71)  RR: 16 (16 Sep 2022 08:48) (16 - 16)  SpO2: 94% (16 Sep 2022 08:48) (94% - 98%) on RA    Daily -  Admission weight () 69.8 kg     Daily Weight in k.1 (16 Sep 2022 07:22)    Gen - NAD, comfortable   HEENT - NCAT, EOMI, MMM  Neck - Supple, No limited ROM  Pulm - resp non labored  Cardiovascular - warm and well perfused  Chest - left chest wall permcath  Abdomen -  semi tympanic, NT, +BS, +lateral RLQ discoloration  Extremities - No Cyanosis, no clubbing, minimal edema to b/l ankles, no calf tenderness  Neuro-     Cognitive - awake, alert, oriented to person and place. Delayed processing/response     Communication - Fluent     Cranial Nerves - CN 2-12 intact. No facial asymmetry, Tongue midline, EOMI, Shoulder shrug intact     Motor -                     LEFT    UE - 3+5                    RIGHT UE - 3-/5                    LEFT    LE - 3+/5                    RIGHT LE - 3-/5        Sensory - Intact to LT bilaterally  MSK: generalized weakness  Skin:  stage 2 pressure ulcer to sacrum 0.3 x 0.2,  discoloration to lateral RLQ of abdomen    RECENT LABS:   LAB - labs today with HD                        8.3    6.27  )-----------( 356      ( 14 Sep 2022 14:15 )             26.4     -14    133<L>  |  94<L>  |  86<H>  ----------------------------<  94  4.0   |  24  |  5.33<H>    Ca    8.5      14 Sep 2022 06:00  Phos  4.8     09-15  Mg     2.0     09-15    TPro  x   /  Alb  1.9<L>  /  TBili  x   /  DBili  x   /  AST  x   /  ALT  x   /  AlkPhos  x       LIVER FUNCTIONS - ( 14 Sep 2022 06:00 )  Alb: 1.9 g/dL / Pro: x     / ALK PHOS: x     / ALT: x     / AST: x     / GGT: x           PT/INR - ( 16 Sep 2022 06:00 )   PT: 24.5 sec;   INR: 2.10 ratio       CAPILLARY BLOOD GLUCOSE  POCT Blood Glucose.: 180 mg/dL (16 Sep 2022 07:50)  POCT Blood Glucose.: 207 mg/dL (15 Sep 2022 21:30)  POCT Blood Glucose.: 257 mg/dL (15 Sep 2022 17:09)  POCT Blood Glucose.: 234 mg/dL (15 Sep 2022 11:20)      Xray Chest 1 View- PORTABLE-Routine (Xray Chest 1 View- PORTABLE-Routine .) (22 @ 12:04) >  Interval exchange of the dialysis catheter. No evidence of pneumothorax.   No other significant change.    Allergies  No Known Allergies    MEDICATIONS  (STANDING):  amLODIPine   Tablet 10 milliGRAM(s) Oral daily  artificial  tears Solution 2 Drop(s) Both EYES every 6 hours  brivaracetam 50 milliGRAM(s) Oral two times a day  carvedilol 12.5 milliGRAM(s) Oral every 12 hours  chlorhexidine 2% Cloths 1 Application(s) Topical <User Schedule>  dextrose 5%. 1000 milliLiter(s) (100 mL/Hr) IV Continuous <Continuous>  dextrose 5%. 1000 milliLiter(s) (50 mL/Hr) IV Continuous <Continuous>  dextrose 50% Injectable 25 Gram(s) IV Push once  dextrose 50% Injectable 12.5 Gram(s) IV Push once  dextrose 50% Injectable 25 Gram(s) IV Push once  doxazosin 4 milliGRAM(s) Oral <User Schedule>  epoetin cortney-epbx (RETACRIT) Injectable 08982 Unit(s) IV Push <User Schedule>  finasteride 5 milliGRAM(s) Oral daily  furosemide    Tablet 80 milliGRAM(s) Oral daily  glucagon  Injectable 1 milliGRAM(s) IntraMuscular once  insulin glargine Injectable (LANTUS) 10 Unit(s) SubCutaneous at bedtime  insulin lispro (ADMELOG) corrective regimen sliding scale   SubCutaneous three times a day before meals  insulin lispro (ADMELOG) corrective regimen sliding scale   SubCutaneous at bedtime  insulin lispro Injectable (ADMELOG) 3 Unit(s) SubCutaneous three times a day before meals  levothyroxine 50 MICROGram(s) Oral daily  melatonin 6 milliGRAM(s) Oral at bedtime  multivitamin 1 Tablet(s) Oral daily  mycophenolate mofetil 500 milliGRAM(s) Oral <User Schedule>  nystatin    Suspension 116301 Unit(s) Oral four times a day  pantoprazole    Tablet 40 milliGRAM(s) Oral <User Schedule>  phenytoin   Capsule 200 milliGRAM(s) Oral two times a day  predniSONE   Tablet 10 milliGRAM(s) Oral daily  sevelamer carbonate 800 milliGRAM(s) Oral three times a day with meals  sirolimus 7 milliGRAM(s) Oral <User Schedule>  trimethoprim   80 mG/sulfamethoxazole 400 mG 1 Tablet(s) Oral daily  valGANciclovir 450 milliGRAM(s) Oral <User Schedule>  warfarin 7.5 milliGRAM(s) Oral once    MEDICATIONS  (PRN):  acetaminophen     Tablet .. 650 milliGRAM(s) Oral every 6 hours PRN Temp greater or equal to 38C (100.4F), Mild Pain (1 - 3)  dextrose Oral Gel 15 Gram(s) Oral once PRN Blood Glucose LESS THAN 70 milliGRAM(s)/deciliter  polyethylene glycol 3350 17 Gram(s) Oral daily PRN Constipation   SUBJECTIVE/ROS:    Patient seen and evaluated during PT   Tolerating therapy - still needs significant assistance + cues.   has a posterior lean which causes imbalance. Used bert gait sling, can be upgraded to use jena dunaway with nursing staff.  Amb 20-35' mod A x1 + wc follow.  Spoke with son in regards to therapy - Patients cognition has bee unchanged x 2 years, would like to focus more on functional activities.      Slept well overnight  Denies CP, palpitation, SOB, or cough  no nausea or abd pain.  LBM 9/15     Vital Signs Last 24 Hrs  T(C): 36.5 (16 Sep 2022 08:48), Max: 36.8 (15 Sep 2022 17:55)  T(F): 97.7 (16 Sep 2022 08:48), Max: 98.3 (15 Sep 2022 17:55)  HR: 63 (16 Sep 2022 08:48) (63 - 65)  BP: 154/68 (16 Sep 2022 08:48) (146/67 - 159/71)  RR: 16 (16 Sep 2022 08:48) (16 - 16)  SpO2: 94% (16 Sep 2022 08:48) (94% - 98%) on RA    Daily -  Admission weight () 69.8 kg     Daily Weight in k.1 (16 Sep 2022 07:22)  Slight wt reduction since admission     Gen - NAD, comfortable   HEENT - NCAT, EOMI, MMM  Neck - Supple, No limited ROM  Pulm - resp non labored  Cardiovascular - warm and well perfused  Chest - left chest wall permcath  Abdomen -  semi tympanic, NT, +BS, +lateral RLQ discoloration  Extremities - No Cyanosis, no clubbing, minimal edema to b/l ankles, no calf tenderness  Neuro-     Cognitive - awake, alert, oriented to person and place. Delayed processing/response     Communication - Fluent     Cranial Nerves - CN 2-12 intact. No facial asymmetry, Tongue midline, EOMI, Shoulder shrug intact     Motor -                     LEFT    UE - 3+5                    RIGHT UE - 3-/5                    LEFT    LE - 3+/5                    RIGHT LE - 3-/5        Sensory - Intact to LT bilaterally  MSK: generalized weakness  Skin:  stage 2 pressure ulcer to sacrum 0.3 x 0.2,  discoloration to lateral RLQ of abdomen    RECENT LABS:   LAB - labs today with HD                        8.3    6.27  )-----------( 356      ( 14 Sep 2022 14:15 )             26.4     09-14    133<L>  |  94<L>  |  86<H>  ----------------------------<  94  4.0   |  24  |  5.33<H>    Ca    8.5      14 Sep 2022 06:00  Phos  4.8     09-15  Mg     2.0     09-15    TPro  x   /  Alb  1.9<L>  /  TBili  x   /  DBili  x   /  AST  x   /  ALT  x   /  AlkPhos  x       LIVER FUNCTIONS - ( 14 Sep 2022 06:00 )  Alb: 1.9 g/dL / Pro: x     / ALK PHOS: x     / ALT: x     / AST: x     / GGT: x           PT/INR - ( 16 Sep 2022 06:00 )   PT: 24.5 sec;   INR: 2.10 ratio       CAPILLARY BLOOD GLUCOSE  POCT Blood Glucose.: 180 mg/dL (16 Sep 2022 07:50)  POCT Blood Glucose.: 207 mg/dL (15 Sep 2022 21:30)  POCT Blood Glucose.: 257 mg/dL (15 Sep 2022 17:09)  POCT Blood Glucose.: 234 mg/dL (15 Sep 2022 11:20)      Xray Chest 1 View- PORTABLE-Routine (Xray Chest 1 View- PORTABLE-Routine .) (22 @ 12:04) >  Interval exchange of the dialysis catheter. No evidence of pneumothorax.   No other significant change.    Allergies  No Known Allergies    MEDICATIONS  (STANDING):  amLODIPine   Tablet 10 milliGRAM(s) Oral daily  artificial  tears Solution 2 Drop(s) Both EYES every 6 hours  brivaracetam 50 milliGRAM(s) Oral two times a day  carvedilol 12.5 milliGRAM(s) Oral every 12 hours  chlorhexidine 2% Cloths 1 Application(s) Topical <User Schedule>  dextrose 5%. 1000 milliLiter(s) (100 mL/Hr) IV Continuous <Continuous>  dextrose 5%. 1000 milliLiter(s) (50 mL/Hr) IV Continuous <Continuous>  dextrose 50% Injectable 25 Gram(s) IV Push once  dextrose 50% Injectable 12.5 Gram(s) IV Push once  dextrose 50% Injectable 25 Gram(s) IV Push once  doxazosin 4 milliGRAM(s) Oral <User Schedule>  epoetin cortney-epbx (RETACRIT) Injectable 82722 Unit(s) IV Push <User Schedule>  finasteride 5 milliGRAM(s) Oral daily  furosemide    Tablet 80 milliGRAM(s) Oral daily  glucagon  Injectable 1 milliGRAM(s) IntraMuscular once  insulin glargine Injectable (LANTUS) 10 Unit(s) SubCutaneous at bedtime  insulin lispro (ADMELOG) corrective regimen sliding scale   SubCutaneous three times a day before meals  insulin lispro (ADMELOG) corrective regimen sliding scale   SubCutaneous at bedtime  insulin lispro Injectable (ADMELOG) 3 Unit(s) SubCutaneous three times a day before meals  levothyroxine 50 MICROGram(s) Oral daily  melatonin 6 milliGRAM(s) Oral at bedtime  multivitamin 1 Tablet(s) Oral daily  mycophenolate mofetil 500 milliGRAM(s) Oral <User Schedule>  nystatin    Suspension 624732 Unit(s) Oral four times a day  pantoprazole    Tablet 40 milliGRAM(s) Oral <User Schedule>  phenytoin   Capsule 200 milliGRAM(s) Oral two times a day  predniSONE   Tablet 10 milliGRAM(s) Oral daily  sevelamer carbonate 800 milliGRAM(s) Oral three times a day with meals  sirolimus 7 milliGRAM(s) Oral <User Schedule>  trimethoprim   80 mG/sulfamethoxazole 400 mG 1 Tablet(s) Oral daily  valGANciclovir 450 milliGRAM(s) Oral <User Schedule>  warfarin 7.5 milliGRAM(s) Oral once    MEDICATIONS  (PRN):  acetaminophen     Tablet .. 650 milliGRAM(s) Oral every 6 hours PRN Temp greater or equal to 38C (100.4F), Mild Pain (1 - 3)  dextrose Oral Gel 15 Gram(s) Oral once PRN Blood Glucose LESS THAN 70 milliGRAM(s)/deciliter  polyethylene glycol 3350 17 Gram(s) Oral daily PRN Constipation

## 2022-09-16 NOTE — PROGRESS NOTE ADULT - SUBJECTIVE AND OBJECTIVE BOX
Patient is a 66y old  Male who presents with a chief complaint of Debility (15 Sep 2022 21:18)    Patient seen and examined at bedside. No overnight events reported.     ALLERGIES:  No Known Allergies    MEDICATIONS  (STANDING):  amLODIPine   Tablet 10 milliGRAM(s) Oral daily  artificial  tears Solution 2 Drop(s) Both EYES every 6 hours  brivaracetam 50 milliGRAM(s) Oral two times a day  carvedilol 12.5 milliGRAM(s) Oral every 12 hours  chlorhexidine 2% Cloths 1 Application(s) Topical <User Schedule>  dextrose 5%. 1000 milliLiter(s) (50 mL/Hr) IV Continuous <Continuous>  dextrose 5%. 1000 milliLiter(s) (100 mL/Hr) IV Continuous <Continuous>  dextrose 50% Injectable 25 Gram(s) IV Push once  dextrose 50% Injectable 12.5 Gram(s) IV Push once  dextrose 50% Injectable 25 Gram(s) IV Push once  doxazosin 4 milliGRAM(s) Oral <User Schedule>  epoetin cortney-epbx (RETACRIT) Injectable 79570 Unit(s) IV Push <User Schedule>  finasteride 5 milliGRAM(s) Oral daily  furosemide    Tablet 80 milliGRAM(s) Oral daily  glucagon  Injectable 1 milliGRAM(s) IntraMuscular once  insulin glargine Injectable (LANTUS) 10 Unit(s) SubCutaneous at bedtime  insulin lispro (ADMELOG) corrective regimen sliding scale   SubCutaneous three times a day before meals  insulin lispro (ADMELOG) corrective regimen sliding scale   SubCutaneous at bedtime  levothyroxine 50 MICROGram(s) Oral daily  melatonin 6 milliGRAM(s) Oral at bedtime  multivitamin 1 Tablet(s) Oral daily  mycophenolate mofetil 500 milliGRAM(s) Oral <User Schedule>  nystatin    Suspension 231347 Unit(s) Oral four times a day  pantoprazole    Tablet 40 milliGRAM(s) Oral <User Schedule>  phenytoin   Capsule 200 milliGRAM(s) Oral two times a day  predniSONE   Tablet 10 milliGRAM(s) Oral daily  sevelamer carbonate 800 milliGRAM(s) Oral three times a day with meals  sirolimus 7 milliGRAM(s) Oral <User Schedule>  trimethoprim   80 mG/sulfamethoxazole 400 mG 1 Tablet(s) Oral daily  valGANciclovir 450 milliGRAM(s) Oral <User Schedule>    MEDICATIONS  (PRN):  acetaminophen     Tablet .. 650 milliGRAM(s) Oral every 6 hours PRN Temp greater or equal to 38C (100.4F), Mild Pain (1 - 3)  dextrose Oral Gel 15 Gram(s) Oral once PRN Blood Glucose LESS THAN 70 milliGRAM(s)/deciliter  polyethylene glycol 3350 17 Gram(s) Oral daily PRN Constipation    Vital Signs Last 24 Hrs  T(F): 97.6 (15 Sep 2022 19:56), Max: 98.3 (15 Sep 2022 17:55)  HR: 64 (16 Sep 2022 05:53) (64 - 65)  BP: 159/71 (16 Sep 2022 05:53) (146/67 - 159/71)  RR: 16 (16 Sep 2022 05:53) (16 - 16)  SpO2: 97% (15 Sep 2022 19:56) (97% - 98%)  I&O's Summary    PHYSICAL EXAM:  NAD, MMM  Supple  RRR S1S2  Permcath chest wall  CTA bilaterally, non labored  soft, BS+  trace edema  A/O x 3, delayed response time but responds appropriately, follows commands         LABS:                        8.3    6.27  )-----------( 356      ( 14 Sep 2022 14:15 )             26.4     09-14    133  |  94  |  86  ----------------------------<  94  4.0   |  24  |  5.33    Ca    8.5      14 Sep 2022 06:00  Phos  4.8     09-15  Mg     2.0     09-15    TPro  x   /  Alb  1.9  /  TBili  x   /  DBili  x   /  AST  x   /  ALT  x   /  AlkPhos  x   09-14          PT/INR - ( 16 Sep 2022 06:00 )   PT: 24.5 sec;   INR: 2.10 ratio       07-27 Chol -- LDL -- HDL -- Trig 118 mg/dL    POCT Blood Glucose.: 180 mg/dL (16 Sep 2022 07:50)  POCT Blood Glucose.: 207 mg/dL (15 Sep 2022 21:30)  POCT Blood Glucose.: 257 mg/dL (15 Sep 2022 17:09)  POCT Blood Glucose.: 234 mg/dL (15 Sep 2022 11:20)    COVID-19 PCR: NotDetec (09-15-22 @ 05:41)  COVID-19 PCR: NotDetec (09-09-22 @ 07:43)  COVID-19 PCR: NotDetec (09-07-22 @ 18:29)  COVID-19 PCR: NotDetec (09-01-22 @ 07:05)  COVID-19 PCR: NotDetec (08-29-22 @ 11:09)

## 2022-09-17 NOTE — PROGRESS NOTE ADULT - ASSESSMENT
66M with DM2, HTN, CAD with stents/CABG, AF on Coumadin, hx CVA, seizure, PUD, s/p failed renal transplant now back on HD, recent hospitalization for seizures requiring intubation and course complicated by UTI, as well as need for VATS due to large hemothorax, now in rehab     #Seizure disorder: c/w phenytoin and Brivaracetam     #ESRD on HD / failed renal transplant: c/w HD as per renal, c/w immunosuppression, c/w infectious disease prophylaxis    #Anemia of chronic disease: stable, c/w EPO    #Chronic AF on Coumadin: goal INR 2-3 (ideally closer to 2 if possible), dose Coumadin 3mg tonight, c/w Coreg    #DM2 with steroid-induced hyperglycemia  A1C with Estimated Average Glucose Result: 6.4 %  Noted low BS in AM; will drop lantus to 5units qhs, c/w premeal insulin  Will see if I can discontinue lantus overall given A1C of 6.4  Will monitor BS over next 24-48hrs   C/w hypoglycemia protocol    #Hypothyroid: c/w synthroid    #Right IJ DVT: c/w Coumadin    #Urinary retention: c/w Proscar and Doxazosin     #Abdominal wall cellulitis: Completed course of antibiotics    #DVT ppx: Coumadin

## 2022-09-17 NOTE — PROGRESS NOTE ADULT - SUBJECTIVE AND OBJECTIVE BOX
Patient is a 66y old  Male who presents with a chief complaint of Debility (16 Sep 2022 16:54)    Nursing noted that FS BS was low this AM, given dextrose with good resolution  Denies chest pain, SOB  Tolerating diet  REports did not sleep last night  Patient seen and examined at bedside.    ALLERGIES:  No Known Allergies    MEDICATIONS  (STANDING):  amLODIPine   Tablet 10 milliGRAM(s) Oral daily  artificial  tears Solution 2 Drop(s) Both EYES every 6 hours  brivaracetam 50 milliGRAM(s) Oral two times a day  carvedilol 12.5 milliGRAM(s) Oral every 12 hours  chlorhexidine 2% Cloths 1 Application(s) Topical <User Schedule>  dextrose 5%. 1000 milliLiter(s) (100 mL/Hr) IV Continuous <Continuous>  dextrose 5%. 1000 milliLiter(s) (50 mL/Hr) IV Continuous <Continuous>  dextrose 50% Injectable 25 Gram(s) IV Push once  dextrose 50% Injectable 12.5 Gram(s) IV Push once  dextrose 50% Injectable 25 Gram(s) IV Push once  doxazosin 4 milliGRAM(s) Oral <User Schedule>  epoetin cortney-epbx (RETACRIT) Injectable 81659 Unit(s) IV Push <User Schedule>  finasteride 5 milliGRAM(s) Oral daily  furosemide    Tablet 80 milliGRAM(s) Oral daily  glucagon  Injectable 1 milliGRAM(s) IntraMuscular once  insulin glargine Injectable (LANTUS) 10 Unit(s) SubCutaneous at bedtime  insulin lispro (ADMELOG) corrective regimen sliding scale   SubCutaneous three times a day before meals  insulin lispro (ADMELOG) corrective regimen sliding scale   SubCutaneous at bedtime  insulin lispro Injectable (ADMELOG) 3 Unit(s) SubCutaneous three times a day before meals  levothyroxine 50 MICROGram(s) Oral daily  melatonin 6 milliGRAM(s) Oral at bedtime  multivitamin 1 Tablet(s) Oral daily  mycophenolate mofetil 500 milliGRAM(s) Oral <User Schedule>  nystatin    Suspension 254914 Unit(s) Oral four times a day  pantoprazole    Tablet 40 milliGRAM(s) Oral <User Schedule>  phenytoin   Capsule 200 milliGRAM(s) Oral two times a day  predniSONE   Tablet 10 milliGRAM(s) Oral daily  sevelamer carbonate 800 milliGRAM(s) Oral three times a day with meals  sirolimus 7 milliGRAM(s) Oral <User Schedule>  trimethoprim   80 mG/sulfamethoxazole 400 mG 1 Tablet(s) Oral daily  valGANciclovir 450 milliGRAM(s) Oral <User Schedule>  warfarin 3 milliGRAM(s) Oral once    MEDICATIONS  (PRN):  acetaminophen     Tablet .. 650 milliGRAM(s) Oral every 6 hours PRN Temp greater or equal to 38C (100.4F), Mild Pain (1 - 3)  dextrose Oral Gel 15 Gram(s) Oral once PRN Blood Glucose LESS THAN 70 milliGRAM(s)/deciliter  polyethylene glycol 3350 17 Gram(s) Oral daily PRN Constipation    Vital Signs Last 24 Hrs  T(F): 97.8 (17 Sep 2022 08:16), Max: 98.2 (16 Sep 2022 20:03)  HR: 55 (17 Sep 2022 08:16) (55 - 67)  BP: 143/66 (17 Sep 2022 08:16) (143/66 - 163/67)  RR: 16 (17 Sep 2022 08:16) (16 - 17)  SpO2: 97% (17 Sep 2022 08:16) (97% - 100%)  I&O's Summary    16 Sep 2022 07:01  -  17 Sep 2022 07:00  --------------------------------------------------------  IN: 800 mL / OUT: 3300 mL / NET: -2500 mL        PHYSICAL EXAM:  General: NAD, A/O x 2, speaks in full sentences, delayed response  ENT: MMM, no scleral icterus  Chest: Permacath noted  Neck: Supple, No JVD, no thyroidomegaly  Lungs: Clear to auscultation bilaterally, no wheezes, no rales, no rhonchi, good inspiratory effort  Cardio: RRR, S1/S2, No murmurs  Abdomen: Soft, Nontender, Nondistended; Bowel sounds present  Extremities: No calf tenderness, No pitting edema, no skin changes    LABS:                        8.0    5.85  )-----------( 308      ( 16 Sep 2022 14:30 )             25.9       09-16    130  |  93  |  93  ----------------------------<  200  4.2   |  25  |  5.65    Ca    8.4      16 Sep 2022 14:30  Phos  4.8     09-15  Mg     2.0     09-15    TPro  6.7  /  Alb  1.9  /  TBili  0.4  /  DBili  x   /  AST  27  /  ALT  41  /  AlkPhos  582  09-16  PT/INR - ( 17 Sep 2022 06:30 )   PT: 30.4 sec;   INR: 2.60 ratio      07-27 Chol -- LDL -- HDL -- Trig 118 mg/dL    POCT Blood Glucose.: 109 mg/dL (17 Sep 2022 08:22)  POCT Blood Glucose.: 69 mg/dL (17 Sep 2022 07:50)  POCT Blood Glucose.: 67 mg/dL (17 Sep 2022 07:44)  POCT Blood Glucose.: 125 mg/dL (16 Sep 2022 21:32)  POCT Blood Glucose.: 173 mg/dL (16 Sep 2022 17:45)  POCT Blood Glucose.: 191 mg/dL (16 Sep 2022 11:52)    COVID-19 PCR: NotDetec (09-15-22 @ 05:41)  COVID-19 PCR: NotDetec (09-09-22 @ 07:43)  COVID-19 PCR: NotDetec (09-07-22 @ 18:29)  COVID-19 PCR: NotDetec (09-01-22 @ 07:05)  COVID-19 PCR: NotDetec (08-29-22 @ 11:09)  COVID-19 PCR: NotDetec (09-15-22 @ 05:41)  COVID-19 PCR: NotDetec (09-09-22 @ 07:43)  COVID-19 PCR: NotDetec (09-07-22 @ 18:29)  COVID-19 PCR: NotDetec (09-01-22 @ 07:05)  COVID-19 PCR: NotDetec (08-29-22 @ 11:09)  COVID-19 PCR: NotDetec (08-25-22 @ 18:36)  COVID-19 PCR: NotDetec (08-22-22 @ 06:42)  COVID-19 PCR: NotDetec (08-15-22 @ 17:07)  COVID-19 PCR: NotDetec (08-07-22 @ 18:05)  COVID-19 PCR: NotDetec (07-25-22 @ 11:51)      Care Discussed with Consultants/Other Providers:   REnal recommendations noted

## 2022-09-17 NOTE — PROVIDER CONTACT NOTE (HYPOGLYCEMIA EVENT) - NS PROVIDER CONTACT NOTE-TREATMENT-HYPO
4 oz Fruit Juice (Specify quantity, date/time)/Other (Specify)
4 oz Fruit Juice (Specify quantity, date/time)

## 2022-09-17 NOTE — PROVIDER CONTACT NOTE (HYPOGLYCEMIA EVENT) - NS PROVIDER CONTACT BACKGROUND-HYPO
Age: 66y    Gender: Male    POCT Blood Glucose:  109 mg/dL (09-17-22 @ 08:22)  69 mg/dL (09-17-22 @ 07:50)  67 mg/dL (09-17-22 @ 07:44)  125 mg/dL (09-16-22 @ 21:32)  173 mg/dL (09-16-22 @ 17:45)  191 mg/dL (09-16-22 @ 11:52)      eMAR:  finasteride   5 milliGRAM(s) Oral (09-16-22 @ 11:59)    insulin glargine Injectable (LANTUS)   10 Unit(s) SubCutaneous (09-16-22 @ 21:34)    insulin lispro (ADMELOG) corrective regimen sliding scale   1 Unit(s) SubCutaneous (09-16-22 @ 17:55)   1 Unit(s) SubCutaneous (09-16-22 @ 12:01)    insulin lispro Injectable (ADMELOG)   3 Unit(s) SubCutaneous (09-16-22 @ 17:54)   3 Unit(s) SubCutaneous (09-16-22 @ 12:01)    levothyroxine   50 MICROGram(s) Oral (09-17-22 @ 05:47)    predniSONE   Tablet   10 milliGRAM(s) Oral (09-17-22 @ 05:47)    
Age: 66y    Gender: Male    POCT Blood Glucose:  140 mg/dL (09-11-22 @ 10:11)  80 mg/dL (09-11-22 @ 09:13)  61 mg/dL (09-11-22 @ 08:53)  51 mg/dL (09-11-22 @ 08:32)  52 mg/dL (09-11-22 @ 08:30)  152 mg/dL (09-10-22 @ 21:13)  151 mg/dL (09-10-22 @ 17:39)  70 mg/dL (09-10-22 @ 12:06)      eMAR:  finasteride   5 milliGRAM(s) Oral (09-10-22 @ 12:19)    insulin glargine Injectable (LANTUS)   10 Unit(s) SubCutaneous (09-10-22 @ 21:19)    insulin lispro (ADMELOG) corrective regimen sliding scale   1 Unit(s) SubCutaneous (09-10-22 @ 17:41)    insulin lispro Injectable (ADMELOG)   4 Unit(s) SubCutaneous (09-10-22 @ 17:41)    levothyroxine   50 MICROGram(s) Oral (09-11-22 @ 05:48)    predniSONE   Tablet   10 milliGRAM(s) Oral (09-11-22 @ 05:48)

## 2022-09-17 NOTE — PROGRESS NOTE ADULT - SUBJECTIVE AND OBJECTIVE BOX
No overnight events.  Did no sleep as well.   Pain is controlled.   No other new ROS.  Has been tolerating rehabilitation program.    VITALS  T(C): 36.8 (09-16-22 @ 20:03), Max: 36.8 (09-16-22 @ 20:03)  HR: 61 (09-17-22 @ 05:45) (58 - 67)  BP: 162/67 (09-17-22 @ 05:45) (144/67 - 163/67)  RR: 16 (09-16-22 @ 20:03) (16 - 17)  SpO2: 98% (09-16-22 @ 20:03) (94% - 100%)  Wt(kg): --     MEDICATIONS   acetaminophen     Tablet .. 650 milliGRAM(s) every 6 hours PRN  amLODIPine   Tablet 10 milliGRAM(s) daily  artificial  tears Solution 2 Drop(s) every 6 hours  brivaracetam 50 milliGRAM(s) two times a day  carvedilol 12.5 milliGRAM(s) every 12 hours  chlorhexidine 2% Cloths 1 Application(s) <User Schedule>  dextrose 5%. 1000 milliLiter(s) <Continuous>  dextrose 5%. 1000 milliLiter(s) <Continuous>  dextrose 50% Injectable 25 Gram(s) once  dextrose 50% Injectable 12.5 Gram(s) once  dextrose 50% Injectable 25 Gram(s) once  dextrose Oral Gel 15 Gram(s) once PRN  doxazosin 4 milliGRAM(s) <User Schedule>  epoetin cortney-epbx (RETACRIT) Injectable 54102 Unit(s) <User Schedule>  finasteride 5 milliGRAM(s) daily  furosemide    Tablet 80 milliGRAM(s) daily  glucagon  Injectable 1 milliGRAM(s) once  insulin glargine Injectable (LANTUS) 10 Unit(s) at bedtime  insulin lispro (ADMELOG) corrective regimen sliding scale   three times a day before meals  insulin lispro (ADMELOG) corrective regimen sliding scale   at bedtime  insulin lispro Injectable (ADMELOG) 3 Unit(s) three times a day before meals  levothyroxine 50 MICROGram(s) daily  melatonin 6 milliGRAM(s) at bedtime  multivitamin 1 Tablet(s) daily  mycophenolate mofetil 500 milliGRAM(s) <User Schedule>  nystatin    Suspension 277980 Unit(s) four times a day  pantoprazole    Tablet 40 milliGRAM(s) <User Schedule>  phenytoin   Capsule 200 milliGRAM(s) two times a day  polyethylene glycol 3350 17 Gram(s) daily PRN  predniSONE   Tablet 10 milliGRAM(s) daily  sevelamer carbonate 800 milliGRAM(s) three times a day with meals  sirolimus 7 milliGRAM(s) <User Schedule>  trimethoprim   80 mG/sulfamethoxazole 400 mG 1 Tablet(s) daily  valGANciclovir 450 milliGRAM(s) <User Schedule>      RECENT LABS/IMAGING                        8.0    5.85  )-----------( 308      ( 16 Sep 2022 14:30 )             25.9     09-16    130<L>  |  93<L>  |  93<H>  ----------------------------<  200<H>  4.2   |  25  |  5.65<H>    Ca    8.4      16 Sep 2022 14:30    TPro  6.7  /  Alb  1.9<L>  /  TBili  0.4  /  DBili  x   /  AST  27  /  ALT  41  /  AlkPhos  582<H>  09-16    PT/INR - ( 17 Sep 2022 06:30 )   PT: 30.4 sec;   INR: 2.60 ratio                  POCT Blood Glucose.: 69 mg/dL (09-17-22 @ 07:50)  POCT Blood Glucose.: 67 mg/dL (09-17-22 @ 07:44)  POCT Blood Glucose.: 125 mg/dL (09-16-22 @ 21:32)  POCT Blood Glucose.: 173 mg/dL (09-16-22 @ 17:45)  POCT Blood Glucose.: 191 mg/dL (09-16-22 @ 11:52)    ------------------------------------------  PHYSICAL EXAM  Constitutional - NAD, Comfortable  Pulm - Breathing comfortably, No wheezing  Abd - Nondistended  Extremities - No calf tenderness  Neurologic Exam - Awake, Alert  Psychiatric - Fatigued    ASSESSMENT/PLAN  66y Male with impairments in mobility and ADLs   - Continue current rehabilitation program 3hrs a day   - Continue current medications, patient is medically stable   - DVT prophylaxis - coumadin dosed 3mg given increase in INR  - Skin - OOB and mobilization daily

## 2022-09-18 NOTE — PROGRESS NOTE ADULT - SUBJECTIVE AND OBJECTIVE BOX
Patient is a 66y old  Male who presents with a chief complaint of Debility     Denies chest pain, SOB  Tolerating diet  REports did not sleep last night  Patient seen and examined at bedside.    ALLERGIES:  No Known Allergies    MEDICATIONS  (STANDING):  amLODIPine   Tablet 10 milliGRAM(s) Oral daily  artificial  tears Solution 2 Drop(s) Both EYES every 6 hours  brivaracetam 50 milliGRAM(s) Oral two times a day  carvedilol 12.5 milliGRAM(s) Oral every 12 hours  chlorhexidine 2% Cloths 1 Application(s) Topical <User Schedule>  dextrose 5%. 1000 milliLiter(s) (100 mL/Hr) IV Continuous <Continuous>  dextrose 5%. 1000 milliLiter(s) (50 mL/Hr) IV Continuous <Continuous>  dextrose 50% Injectable 25 Gram(s) IV Push once  dextrose 50% Injectable 12.5 Gram(s) IV Push once  dextrose 50% Injectable 25 Gram(s) IV Push once  doxazosin 4 milliGRAM(s) Oral <User Schedule>  epoetin cortney-epbx (RETACRIT) Injectable 36146 Unit(s) IV Push <User Schedule>  finasteride 5 milliGRAM(s) Oral daily  furosemide    Tablet 80 milliGRAM(s) Oral daily  glucagon  Injectable 1 milliGRAM(s) IntraMuscular once  insulin glargine Injectable (LANTUS) 10 Unit(s) SubCutaneous at bedtime  insulin lispro (ADMELOG) corrective regimen sliding scale   SubCutaneous three times a day before meals  insulin lispro (ADMELOG) corrective regimen sliding scale   SubCutaneous at bedtime  insulin lispro Injectable (ADMELOG) 3 Unit(s) SubCutaneous three times a day before meals  levothyroxine 50 MICROGram(s) Oral daily  melatonin 6 milliGRAM(s) Oral at bedtime  multivitamin 1 Tablet(s) Oral daily  mycophenolate mofetil 500 milliGRAM(s) Oral <User Schedule>  nystatin    Suspension 233029 Unit(s) Oral four times a day  pantoprazole    Tablet 40 milliGRAM(s) Oral <User Schedule>  phenytoin   Capsule 200 milliGRAM(s) Oral two times a day  predniSONE   Tablet 10 milliGRAM(s) Oral daily  sevelamer carbonate 800 milliGRAM(s) Oral three times a day with meals  sirolimus 7 milliGRAM(s) Oral <User Schedule>  trimethoprim   80 mG/sulfamethoxazole 400 mG 1 Tablet(s) Oral daily  valGANciclovir 450 milliGRAM(s) Oral <User Schedule>  warfarin 3 milliGRAM(s) Oral once    MEDICATIONS  (PRN):  acetaminophen     Tablet .. 650 milliGRAM(s) Oral every 6 hours PRN Temp greater or equal to 38C (100.4F), Mild Pain (1 - 3)  dextrose Oral Gel 15 Gram(s) Oral once PRN Blood Glucose LESS THAN 70 milliGRAM(s)/deciliter  polyethylene glycol 3350 17 Gram(s) Oral daily PRN Constipation    Vital Signs Last 24 Hrs  T(C): 36.7 (18 Sep 2022 09:29), Max: 36.7 (18 Sep 2022 09:29)  T(F): 98 (18 Sep 2022 09:29), Max: 98 (18 Sep 2022 09:29)  HR: 66 (18 Sep 2022 09:29) (61 - 67)  BP: 133/66 (18 Sep 2022 09:29) (133/66 - 166/67)  RR: 14 (18 Sep 2022 09:29) (14 - 16)  SpO2: 98% (18 Sep 2022 09:29) (97% - 98%)    Parameters below as of 18 Sep 2022 09:29  Patient On (Oxygen Delivery Method): room air    PHYSICAL EXAM:  General: NAD, A/O x 2, speaks in full sentences, delayed response  ENT: MMM, no scleral icterus  Chest: Permacath noted  Neck: Supple, No JVD, no thyroidomegaly  Lungs: Clear to auscultation bilaterally, no wheezes, no rales, no rhonchi, good inspiratory effort  Cardio: RRR, S1/S2, No murmurs  Abdomen: Soft, Nontender, Nondistended; Bowel sounds present  Extremities: No calf tenderness, No pitting edema, no skin changes    LABS:                        8.0    5.85  )-----------( 308      ( 16 Sep 2022 14:30 )             25.9       09-16    130  |  93  |  93  ----------------------------<  200  4.2   |  25  |  5.65    Ca    8.4      16 Sep 2022 14:30  Phos  4.8     09-15  Mg     2.0     09-15    TPro  6.7  /  Alb  1.9  /  TBili  0.4  /  DBili  x   /  AST  27  /  ALT  41  /  AlkPhos  582  09-16  PT/INR - ( 18 Sep 2022 05:50 )   PT: 36.5 sec;   INR: 3.11 ratio      07-27 Chol -- LDL -- HDL -- Trig 118 mg/dL    POCT Blood Glucose.: 109 mg/dL (17 Sep 2022 08:22)  POCT Blood Glucose.: 69 mg/dL (17 Sep 2022 07:50)  POCT Blood Glucose.: 67 mg/dL (17 Sep 2022 07:44)  POCT Blood Glucose.: 125 mg/dL (16 Sep 2022 21:32)  POCT Blood Glucose.: 173 mg/dL (16 Sep 2022 17:45)  POCT Blood Glucose.: 191 mg/dL (16 Sep 2022 11:52)    COVID-19 PCR: NotDetec (09-15-22 @ 05:41)  COVID-19 PCR: NotDetec (09-09-22 @ 07:43)  COVID-19 PCR: NotDetec (09-07-22 @ 18:29)  COVID-19 PCR: NotDetec (09-01-22 @ 07:05)  COVID-19 PCR: NotDetec (08-29-22 @ 11:09)  COVID-19 PCR: NotDetec (09-15-22 @ 05:41)  COVID-19 PCR: NotDetec (09-09-22 @ 07:43)  COVID-19 PCR: NotDetec (09-07-22 @ 18:29)  COVID-19 PCR: NotDetec (09-01-22 @ 07:05)  COVID-19 PCR: NotDetec (08-29-22 @ 11:09)  COVID-19 PCR: NotDetec (08-25-22 @ 18:36)  COVID-19 PCR: NotDetec (08-22-22 @ 06:42)  COVID-19 PCR: NotDetec (08-15-22 @ 17:07)  COVID-19 PCR: NotDetec (08-07-22 @ 18:05)  COVID-19 PCR: NotDetec (07-25-22 @ 11:51)      Care Discussed with Consultants/Other Providers:   REnal recommendations noted

## 2022-09-18 NOTE — PROGRESS NOTE ADULT - SUBJECTIVE AND OBJECTIVE BOX
No overnight events.  Did not sleep well.  Reports pain is controlled.   Has been tolerating rehabilitation program.    VITALS  T(C): 36.6 (09-17-22 @ 19:22), Max: 36.6 (09-17-22 @ 08:16)  HR: 61 (09-18-22 @ 05:21) (55 - 67)  BP: 135/68 (09-18-22 @ 05:21) (135/68 - 166/67)  RR: 16 (09-17-22 @ 19:22) (16 - 16)  SpO2: 97% (09-17-22 @ 19:22) (97% - 97%)  Wt(kg): --     MEDICATIONS   acetaminophen     Tablet .. 650 milliGRAM(s) every 6 hours PRN  amLODIPine   Tablet 10 milliGRAM(s) daily  artificial  tears Solution 2 Drop(s) every 6 hours  brivaracetam 50 milliGRAM(s) two times a day  carvedilol 12.5 milliGRAM(s) every 12 hours  chlorhexidine 2% Cloths 1 Application(s) <User Schedule>  dextrose 5%. 1000 milliLiter(s) <Continuous>  dextrose 5%. 1000 milliLiter(s) <Continuous>  dextrose 50% Injectable 25 Gram(s) once  dextrose 50% Injectable 12.5 Gram(s) once  dextrose 50% Injectable 25 Gram(s) once  dextrose Oral Gel 15 Gram(s) once PRN  doxazosin 4 milliGRAM(s) <User Schedule>  epoetin cortney-epbx (RETACRIT) Injectable 30877 Unit(s) <User Schedule>  finasteride 5 milliGRAM(s) daily  furosemide    Tablet 80 milliGRAM(s) daily  glucagon  Injectable 1 milliGRAM(s) once  insulin glargine Injectable (LANTUS) 5 Unit(s) at bedtime  insulin lispro (ADMELOG) corrective regimen sliding scale   three times a day before meals  insulin lispro (ADMELOG) corrective regimen sliding scale   at bedtime  insulin lispro Injectable (ADMELOG) 3 Unit(s) three times a day before meals  levothyroxine 50 MICROGram(s) daily  melatonin 6 milliGRAM(s) at bedtime  multivitamin 1 Tablet(s) daily  mycophenolate mofetil 500 milliGRAM(s) <User Schedule>  nystatin    Suspension 541601 Unit(s) four times a day  pantoprazole    Tablet 40 milliGRAM(s) <User Schedule>  phenytoin   Capsule 200 milliGRAM(s) two times a day  polyethylene glycol 3350 17 Gram(s) daily PRN  predniSONE   Tablet 10 milliGRAM(s) daily  sevelamer carbonate 800 milliGRAM(s) three times a day with meals  sirolimus 7 milliGRAM(s) <User Schedule>  trimethoprim   80 mG/sulfamethoxazole 400 mG 1 Tablet(s) daily  valGANciclovir 450 milliGRAM(s) <User Schedule>      RECENT LABS/IMAGING                        8.0    5.85  )-----------( 308      ( 16 Sep 2022 14:30 )             25.9     09-16    130<L>  |  93<L>  |  93<H>  ----------------------------<  200<H>  4.2   |  25  |  5.65<H>    Ca    8.4      16 Sep 2022 14:30    TPro  6.7  /  Alb  1.9<L>  /  TBili  0.4  /  DBili  x   /  AST  27  /  ALT  41  /  AlkPhos  582<H>  09-16    PT/INR - ( 18 Sep 2022 05:50 )   PT: 36.5 sec;   INR: 3.11 ratio                  POCT Blood Glucose.: 90 mg/dL (09-18-22 @ 07:38)  POCT Blood Glucose.: 142 mg/dL (09-17-22 @ 21:39)  POCT Blood Glucose.: 248 mg/dL (09-17-22 @ 17:20)  POCT Blood Glucose.: 235 mg/dL (09-17-22 @ 12:05)  POCT Blood Glucose.: 109 mg/dL (09-17-22 @ 08:22)  POCT Blood Glucose.: 69 mg/dL (09-17-22 @ 07:50)  POCT Blood Glucose.: 67 mg/dL (09-17-22 @ 07:44)    ------------------------------------------  PHYSICAL EXAM  Constitutional - NAD, Comfortable  Pulm - Breathing comfortably, No wheezing  Abd - Nondistended  Neurologic Exam - Awake, Alert, Dysarthria  Psychiatric - Fatigued    ASSESSMENT/PLAN  66y Male with impairments in mobility and ADLs   - Continue current rehabilitation program 3hrs a day   - Continue current medications, patient is medically stable   - DVT prophylaxis  - Skin - OOB and mobilization daily   - Coumadin 3mg dosed

## 2022-09-18 NOTE — PROGRESS NOTE ADULT - ASSESSMENT
66M with DM2, HTN, CAD with stents/CABG, AF on Coumadin, hx CVA, seizure, PUD, s/p failed renal transplant now back on HD, recent hospitalization for seizures requiring intubation and course complicated by UTI, as well as need for VATS due to large hemothorax, now in rehab     #Seizure disorder: c/w phenytoin and Brivaracetam     #ESRD on HD / failed renal transplant: c/w HD as per renal, c/w immunosuppression, c/w infectious disease prophylaxis    #Anemia of chronic disease: stable, c/w EPO    #Chronic AF on Coumadin: goal INR 2-3 (ideally closer to 2 if possible), HOLD coumadin tonight, c/w Coreg    #DM2 with steroid-induced hyperglycemia  A1C with Estimated Average Glucose Result: 6.4 %  C/w lantus 5units qhs, c/w premeal insulin  Will see if I can discontinue lantus overall given A1C of 6.4  Will monitor BS over next 24-48hrs   C/w hypoglycemia protocol    #Hypothyroid: c/w synthroid    #Right IJ DVT: Hold coumadin tonight    #Urinary retention: c/w Proscar and Doxazosin     #Abdominal wall cellulitis: Completed course of antibiotics    #DVT ppx: Coumadin

## 2022-09-19 NOTE — PROGRESS NOTE ADULT - SUBJECTIVE AND OBJECTIVE BOX
No distress    Vital Signs Last 24 Hrs  T(C): 36.4 (09-19-22 @ 07:55), Max: 37.1 (09-19-22 @ 05:05)  T(F): 97.5 (09-19-22 @ 07:55), Max: 98.8 (09-19-22 @ 05:05)  HR: 59 (09-19-22 @ 07:55) (59 - 79)  BP: 139/64 (09-19-22 @ 07:55) (139/64 - 143/76)  RR: 16 (09-19-22 @ 07:55) (15 - 16)  SpO2: 97% (09-19-22 @ 07:55) (97% - 98%)    s1s2  b/l air entry  soft, ND  no edema                                        8.1    5.28  )-----------( 292      ( 19 Sep 2022 14:15 )             26.3     19 Sep 2022 14:15    131    |  91     |  108    ----------------------------<  131    4.1     |  26     |  6.46     Ca    8.6        19 Sep 2022 14:15  Phos  6.2       19 Sep 2022 14:15    TPro  6.9    /  Alb  2.0    /  TBili  0.4    /  DBili  x      /  AST  32     /  ALT  41     /  AlkPhos  656    19 Sep 2022 14:15    LIVER FUNCTIONS - ( 19 Sep 2022 14:15 )  Alb: 2.0 g/dL / Pro: 6.9 g/dL / ALK PHOS: 656 U/L / ALT: 41 U/L / AST: 32 U/L / GGT: x           PT/INR - ( 19 Sep 2022 06:17 )   PT: 31.3 sec;   INR: 2.67 ratio      acetaminophen     Tablet .. 650 milliGRAM(s) Oral every 6 hours PRN  amLODIPine   Tablet 10 milliGRAM(s) Oral daily  artificial  tears Solution 2 Drop(s) Both EYES every 6 hours  brivaracetam 50 milliGRAM(s) Oral two times a day  carvedilol 12.5 milliGRAM(s) Oral every 12 hours  chlorhexidine 2% Cloths 1 Application(s) Topical <User Schedule>  dextrose 5%. 1000 milliLiter(s) IV Continuous <Continuous>  dextrose 5%. 1000 milliLiter(s) IV Continuous <Continuous>  dextrose 50% Injectable 25 Gram(s) IV Push once  dextrose 50% Injectable 12.5 Gram(s) IV Push once  dextrose 50% Injectable 25 Gram(s) IV Push once  dextrose Oral Gel 15 Gram(s) Oral once PRN  doxazosin 4 milliGRAM(s) Oral <User Schedule>  epoetin cortney-epbx (RETACRIT) Injectable 20997 Unit(s) IV Push <User Schedule>  finasteride 5 milliGRAM(s) Oral daily  furosemide    Tablet 80 milliGRAM(s) Oral daily  glucagon  Injectable 1 milliGRAM(s) IntraMuscular once  insulin glargine Injectable (LANTUS) 5 Unit(s) SubCutaneous at bedtime  insulin lispro (ADMELOG) corrective regimen sliding scale   SubCutaneous three times a day before meals  insulin lispro (ADMELOG) corrective regimen sliding scale   SubCutaneous at bedtime  insulin lispro Injectable (ADMELOG) 3 Unit(s) SubCutaneous three times a day before meals  levothyroxine 50 MICROGram(s) Oral daily  multivitamin 1 Tablet(s) Oral daily  mycophenolate mofetil 500 milliGRAM(s) Oral <User Schedule>  nystatin    Suspension 467126 Unit(s) Oral four times a day  pantoprazole    Tablet 40 milliGRAM(s) Oral <User Schedule>  phenytoin   Capsule 200 milliGRAM(s) Oral two times a day  polyethylene glycol 3350 17 Gram(s) Oral daily PRN  predniSONE   Tablet 10 milliGRAM(s) Oral daily  ramelteon 8 milliGRAM(s) Oral at bedtime  sevelamer carbonate 800 milliGRAM(s) Oral three times a day with meals  sirolimus 7 milliGRAM(s) Oral <User Schedule>  trimethoprim   80 mG/sulfamethoxazole 400 mG 1 Tablet(s) Oral daily  valGANciclovir 450 milliGRAM(s) Oral <User Schedule>  warfarin 6 milliGRAM(s) Oral once    A/P:    S/p complicated hospital course as per HPI  Now in [facility]  S/p DDRT 4/21/22, required HD, came off HD since 5/5/22, back on HD since 7/27/22  Will continue transplant meds per transplant team recommendations  Avoid nephrotoxins  HD MWF  Epoetin w/HD 3 x week  Renal diet  Bld work w/each HD   Rehab    [phone]

## 2022-09-19 NOTE — PROGRESS NOTE ADULT - ASSESSMENT
66 yr old Male with PMHx of DM type II, HTN, CAD s/p stents/ CABG (2020), AFib on Eliquis, CVA (2019) d/t Afib, Seizure d/o following CVA, last episode was on 4/8/22, h/o GIB (2/2022) EGD with duodenal ulcer.  ESRD due to DM was on HD since 2019, s/p premacath removal 5/10/22 s/p Right DDRT (4/21/22) and placed on belatacept.  Frequent hospitalization d/t weakness, refractory sz, and sepsis.  Admitted and intubated 6/10, found to large pleural effusion s/p thoracentesis ~1.3 and VATS of thoracic 2.2 L.      TRANSPLANT PATIENT -  * INR 2.67 (9/19) - coumadin 6mg for today  * transplant team PHONE  --Iker Alfred (MD) ??  Dr. Eduardo Barber (transplant surgeon) - Darleen Dior NP [phone]*  * Barby NP - recommendations for labs: BK virus DNA (9/15 - negative), lactate (367), mag (2.0), parathyroid (202), phosphorus (2.8), CRP (230), uric acid (3.2), and UA (oliguria).  CMV pcr (9/15 - negative)  * HD M/W/F - need additional brivaracetam after HD        COMORBIDITES/ACTIVE MEDICAL ISSUES   Gait Instability, ADL impairments and Functional impairments secondary to recurrent UTI, status epilepticus, with debility seizures. 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  Belatacept d/t 8/1 and was started on Sirolimus 7mg QD  - Prednisone 10  - Cellcept 250 BID on hold  - S/p perma cath placement 8/30/2022  - PPx medications: Nystatin, Bactrim, Valcyte (MWF)  - Nephrology following - modified to [facility]    #Anemia of chronic disease  - Has gotten total of 6U PRBC and 2 U FFP during acute hospital   - monitor H/H 8.0 (9/12) - 8.0 (9/16) - repeat CBC 9/19 with HD    #HTN  #Afib  - Eliquis switched to Coumadin  - Coreg   - INR 2.67 (9/19) + Coumadin dose- 6mg for 9/19    #Diabetes  - A1C  - Lantus 3U -> inc 10 U  - standing Admelog dc, restarted 9/16 @ 3U with meals  - FS + ISS  - Hospitalist following   - FS better controlled    #Hypothyroidism  - Synthroid 37.5 mcg    #Pain control  - Tylenol PRN    #GI/Bowel Mgmt   - At risk for constipation due to neurologic diagnosis, immobility and/or medication use  - Senna d/c d/t loose BM 9/11  -  Miralax PRN    #Bladder management  - incontinence     #DVT   - acute DVT to R IJ thrombosis, Superficial vein thrombus to right basilic and cephalic vein  - Coumadin  - SCDs, TEDs     #Skin:  -Stage 2 pressure ulcer to sacrum 0.3 x 0.2- cleanse with NS, apply wet gauze andcover with allevyn foam dressing  -At risk for pressure injury due to neurologic diagnosis and relative immobility  -Bilateral heels - soft heel protectors, apply liquid barrier film daily and monitor for tissue type changes  - Turn Q2 hr while in bed, air mattress  - Skin barrier cream as needed  - Nursing to monitor skin Qshift    Diet  - Regular/easy to chew + Thins  [[facility], renal diet]    + Fluid Restriction: 1L    Precautions / PROPHYLAXIS:   - Falls, Cardiac, Seizure   - ortho: Weight bearing status: WBAT   - Lungs: Aspiration, Incentive Spirometer   - Pressure injury/Skin: Turn Q2hrs while in bed, OOB to Chair, PT/OT      liaison with family 9/12--Spoke with son Dr Perez  He gave update on patient's clinical state, investigation results and function prior to acute rehab treatment   I gave update on patients current status, and he was happy with same  Plan to to give interval updates to family as needed    IDT 9/12  SW: lives with spouse and 2 sons in  with 6 JAR[place]TT, 1st FL setup.  PTA needed some assistance with ADLs  NSG: total A/incont  + stage 2 to sacrum  Functional status: max A with ADLs except mod A for stand-pivot with standby, transferring with bert (likely upgrade to jena emelyn soon), decreased core strength.  SLP: currrently on easy to chew d/t dentures, has severe cog deficit, mild dysarthria, sequence ADLs with min cues  Goals: Close SV with ADLs, CG with functional transfer/amb/stair (but min-mod A more realistically)  Barriers: decreased safety, atrophy d/t deconditioning  TDD: 9/29 home    Follow ups:  Andre Patterson)  Clinical Neurophysiology; EEGEpilepsy; Neurology  [address] [facility] [facility], [address]  [place] [address]  Phone: [phone]  Fax: [phone]  Follow Up Time:     Padmini Lincoln ()  Internal Medicine  [address] [facility], [address]  [place] [address]  Phone: [phone]  Fax: [phone]  Follow Up Time:     Iker Alfred)  Internal Medicine; Nephrology  [address] [place] [address]  Phone: [phone]  Fax: [phone]  Follow Up Time:    Dr. Eduardo Barber - Organ Transplant    Iker Alfred  [facility]  NEPHRO [address] [place] [address] D  Scheduled Appointment: 09/20/2022    Iker Alfred  [facility]  NEPHRO [address] [place] [address] D  Scheduled Appointment: 10/20/2022

## 2022-09-19 NOTE — PROGRESS NOTE ADULT - SUBJECTIVE AND OBJECTIVE BOX
SUBJECTIVE/ROS:  Patient seen and evaluated while resting in bed   No acute event over the weekend  Feeling well  Denies CP, palpitation, SOB, or cough  No HA, dizziness, or lightheadedness  no nausea or abd pain.  LBM      Vital Signs Last 24 Hrs  T(C): 36.4 (22 @ 07:55), Max: 37.1 (22 @ 05:05)  T(F): 97.5 (22 @ 07:55), Max: 98.8 (22 @ 05:05)  HR: 59 (22 @ 07:55) (59 - 79)  BP: 139/64 (22 @ 07:55) (139/64 - 143/76)  RR: 16 (22 @ 07:55) (15 - 16)  SpO2: 97% (22 @ 07:55) (97% - 98%) on room air      Daily -  Admission weight () 69.8 kg     Daily Weight in k.9 (19 Sep 2022 05:51)    Gen - NAD, comfortable on RA  HEENT - NCAT, EOMI, MMM  Neck - Supple, No limited ROM  Pulm - resp non labored  Cardiovascular - warm and well perfused  Chest - left chest wall permcath  Abdomen -  semi tympanic, NT, +BS, +lateral RLQ discoloration  Extremities - No Cyanosis, no clubbing, minimal edema to b/l ankles, no calf tenderness  Neuro-     Cognitive - awake, alert, oriented to person and place. Delayed processing/response     Communication - Fluent     Cranial Nerves - CN 2-12 intact. No facial asymmetry, Tongue midline, EOMI, Shoulder shrug intact     Motor -                     LEFT    UE - 3+5                    RIGHT UE - 3-/5                    LEFT    LE - 3+/5                    RIGHT LE - 3-/5        Sensory - Intact to LT bilaterally  MSK: generalized weakness  Skin:  stage 2 pressure ulcer to sacrum 0.3 x 0.2,  discoloration to lateral RLQ of abdomen    RECENT LABS:   LAB - labs with HD today                        8.3    6.27  )-----------( 356      ( 14 Sep 2022 14:15 )             26.4     09-14    133<L>  |  94<L>  |  86<H>  ----------------------------<  94  4.0   |  24  |  5.33<H>    Ca    8.5      14 Sep 2022 06:00  Phos  4.8     09-15  Mg     2.0     09-15    TPro  x   /  Alb  1.9<L>  /  TBili  x   /  DBili  x   /  AST  x   /  ALT  x   /  AlkPhos  x       LIVER FUNCTIONS - ( 14 Sep 2022 06:00 )  Alb: 1.9 g/dL / Pro: x     / ALK PHOS: x     / ALT: x     / AST: x     / GGT: x           PT/INR - ( 19 Sep 2022 06:17 )   PT: 31.3 sec;   INR: 2.67 ratio       CAPILLARY BLOOD GLUCOSE  POCT Blood Glucose.: 163 mg/dL (19 Sep 2022 07:58)  POCT Blood Glucose.: 121 mg/dL (18 Sep 2022 20:25)  POCT Blood Glucose.: 134 mg/dL (18 Sep 2022 17:15)  POCT Blood Glucose.: 245 mg/dL (18 Sep 2022 12:36)      Xray Chest 1 View- PORTABLE-Routine (Xray Chest 1 View- PORTABLE-Routine .) (22 @ 12:04) >  Interval exchange of the dialysis catheter. No evidence of pneumothorax.   No other significant change.    Allergies  No Known Allergies    MEDICATIONS  (STANDING):  amLODIPine   Tablet 10 milliGRAM(s) Oral daily  artificial  tears Solution 2 Drop(s) Both EYES every 6 hours  brivaracetam 50 milliGRAM(s) Oral two times a day  carvedilol 12.5 milliGRAM(s) Oral every 12 hours  chlorhexidine 2% Cloths 1 Application(s) Topical <User Schedule>  dextrose 5%. 1000 milliLiter(s) (50 mL/Hr) IV Continuous <Continuous>  dextrose 5%. 1000 milliLiter(s) (100 mL/Hr) IV Continuous <Continuous>  dextrose 50% Injectable 25 Gram(s) IV Push once  dextrose 50% Injectable 12.5 Gram(s) IV Push once  dextrose 50% Injectable 25 Gram(s) IV Push once  doxazosin 4 milliGRAM(s) Oral <User Schedule>  epoetin cortney-epbx (RETACRIT) Injectable 46594 Unit(s) IV Push <User Schedule>  finasteride 5 milliGRAM(s) Oral daily  furosemide    Tablet 80 milliGRAM(s) Oral daily  glucagon  Injectable 1 milliGRAM(s) IntraMuscular once  insulin glargine Injectable (LANTUS) 5 Unit(s) SubCutaneous at bedtime  insulin lispro (ADMELOG) corrective regimen sliding scale   SubCutaneous three times a day before meals  insulin lispro (ADMELOG) corrective regimen sliding scale   SubCutaneous at bedtime  insulin lispro Injectable (ADMELOG) 3 Unit(s) SubCutaneous three times a day before meals  levothyroxine 50 MICROGram(s) Oral daily  multivitamin 1 Tablet(s) Oral daily  mycophenolate mofetil 500 milliGRAM(s) Oral <User Schedule>  nystatin    Suspension 069539 Unit(s) Oral four times a day  pantoprazole    Tablet 40 milliGRAM(s) Oral <User Schedule>  phenytoin   Capsule 200 milliGRAM(s) Oral two times a day  predniSONE   Tablet 10 milliGRAM(s) Oral daily  ramelteon 8 milliGRAM(s) Oral at bedtime  sevelamer carbonate 800 milliGRAM(s) Oral three times a day with meals  sirolimus 7 milliGRAM(s) Oral <User Schedule>  trimethoprim   80 mG/sulfamethoxazole 400 mG 1 Tablet(s) Oral daily  valGANciclovir 450 milliGRAM(s) Oral <User Schedule>  warfarin 6 milliGRAM(s) Oral once    MEDICATIONS  (PRN):  acetaminophen     Tablet .. 650 milliGRAM(s) Oral every 6 hours PRN Temp greater or equal to 38C (100.4F), Mild Pain (1 - 3)  dextrose Oral Gel 15 Gram(s) Oral once PRN Blood Glucose LESS THAN 70 milliGRAM(s)/deciliter  polyethylene glycol 3350 17 Gram(s) Oral daily PRN Constipation   SUBJECTIVE/ROS:  Patient seen and evaluated while resting in bed   No acute event over the weekend  Feeling well  Denies CP, palpitation, SOB, or cough  No HA, dizziness, or lightheadedness  no nausea or abd pain.  LBM      labs unremarkable   INR therapeutic, but dose was held yesterday due to preceeding supratherapeutic levels    Vital Signs Last 24 Hrs  T(C): 36.4 (22 @ 07:55), Max: 37.1 (22 @ 05:05)  T(F): 97.5 (22 @ 07:55), Max: 98.8 (22 @ 05:05)  HR: 59 (22 @ 07:55) (59 - 79)  BP: 139/64 (22 @ 07:55) (139/64 - 143/76)  RR: 16 (22 @ 07:55) (15 - 16)  SpO2: 97% (22 @ 07:55) (97% - 98%) on room air      Daily -  Admission weight () 69.8 kg     Daily Weight in k.9 (19 Sep 2022 05:51)    Gen - NAD, comfortable on RA  HEENT - NCAT, EOMI, MMM  Neck - Supple, No limited ROM  Pulm - resp non labored  Cardiovascular - warm and well perfused  Chest - left chest wall permcath  Abdomen -  semi tympanic, NT, +BS, +lateral RLQ discoloration  Extremities - No Cyanosis, no clubbing, minimal edema to b/l ankles, no calf tenderness  Neuro-     Cognitive - awake, alert, oriented to person and place. Delayed processing/response     Communication - Fluent     Cranial Nerves - CN 2-12 intact. No facial asymmetry, Tongue midline, EOMI, Shoulder shrug intact     Motor -                     LEFT    UE - 3+5                    RIGHT UE - 3-/5                    LEFT    LE - 3+/5                    RIGHT LE - 3-/5        Sensory - Intact to LT bilaterally  MSK: generalized weakness  Skin:  stage 2 pressure ulcer to sacrum 0.3 x 0.2,  discoloration to lateral RLQ of abdomen    RECENT LABS:   LAB - labs with HD today                        8.3    6.27  )-----------( 356      ( 14 Sep 2022 14:15 )             26.4     09-14    133<L>  |  94<L>  |  86<H>  ----------------------------<  94  4.0   |  24  |  5.33<H>    Ca    8.5      14 Sep 2022 06:00  Phos  4.8     15  Mg     2.0     15    TPro  x   /  Alb  1.9<L>  /  TBili  x   /  DBili  x   /  AST  x   /  ALT  x   /  AlkPhos  x       LIVER FUNCTIONS - ( 14 Sep 2022 06:00 )  Alb: 1.9 g/dL / Pro: x     / ALK PHOS: x     / ALT: x     / AST: x     / GGT: x           PT/INR - ( 19 Sep 2022 06:17 )   PT: 31.3 sec;   INR: 2.67 ratio       CAPILLARY BLOOD GLUCOSE  POCT Blood Glucose.: 163 mg/dL (19 Sep 2022 07:58)  POCT Blood Glucose.: 121 mg/dL (18 Sep 2022 20:25)  POCT Blood Glucose.: 134 mg/dL (18 Sep 2022 17:15)  POCT Blood Glucose.: 245 mg/dL (18 Sep 2022 12:36)      Xray Chest 1 View- PORTABLE-Routine (Xray Chest 1 View- PORTABLE-Routine .) (22 @ 12:04) >  Interval exchange of the dialysis catheter. No evidence of pneumothorax.   No other significant change.    Allergies  No Known Allergies    MEDICATIONS  (STANDING):  amLODIPine   Tablet 10 milliGRAM(s) Oral daily  artificial  tears Solution 2 Drop(s) Both EYES every 6 hours  brivaracetam 50 milliGRAM(s) Oral two times a day  carvedilol 12.5 milliGRAM(s) Oral every 12 hours  chlorhexidine 2% Cloths 1 Application(s) Topical <User Schedule>  dextrose 5%. 1000 milliLiter(s) (50 mL/Hr) IV Continuous <Continuous>  dextrose 5%. 1000 milliLiter(s) (100 mL/Hr) IV Continuous <Continuous>  dextrose 50% Injectable 25 Gram(s) IV Push once  dextrose 50% Injectable 12.5 Gram(s) IV Push once  dextrose 50% Injectable 25 Gram(s) IV Push once  doxazosin 4 milliGRAM(s) Oral <User Schedule>  epoetin cortney-epbx (RETACRIT) Injectable 71234 Unit(s) IV Push <User Schedule>  finasteride 5 milliGRAM(s) Oral daily  furosemide    Tablet 80 milliGRAM(s) Oral daily  glucagon  Injectable 1 milliGRAM(s) IntraMuscular once  insulin glargine Injectable (LANTUS) 5 Unit(s) SubCutaneous at bedtime  insulin lispro (ADMELOG) corrective regimen sliding scale   SubCutaneous three times a day before meals  insulin lispro (ADMELOG) corrective regimen sliding scale   SubCutaneous at bedtime  insulin lispro Injectable (ADMELOG) 3 Unit(s) SubCutaneous three times a day before meals  levothyroxine 50 MICROGram(s) Oral daily  multivitamin 1 Tablet(s) Oral daily  mycophenolate mofetil 500 milliGRAM(s) Oral <User Schedule>  nystatin    Suspension [id number] Unit(s) Oral four times a day  pantoprazole    Tablet 40 milliGRAM(s) Oral <User Schedule>  phenytoin   Capsule 200 milliGRAM(s) Oral two times a day  predniSONE   Tablet 10 milliGRAM(s) Oral daily  ramelteon 8 milliGRAM(s) Oral at bedtime  sevelamer carbonate 800 milliGRAM(s) Oral three times a day with meals  sirolimus 7 milliGRAM(s) Oral <User Schedule>  trimethoprim   80 mG/sulfamethoxazole 400 mG 1 Tablet(s) Oral daily  valGANciclovir 450 milliGRAM(s) Oral <User Schedule>  warfarin 6 milliGRAM(s) Oral once    MEDICATIONS  (PRN):  acetaminophen     Tablet .. 650 milliGRAM(s) Oral every 6 hours PRN Temp greater or equal to 38C (100.4F), Mild Pain (1 - 3)  dextrose Oral Gel 15 Gram(s) Oral once PRN Blood Glucose LESS THAN 70 milliGRAM(s)/deciliter  polyethylene glycol 3350 17 Gram(s) Oral daily PRN Constipation

## 2022-09-19 NOTE — PROGRESS NOTE ADULT - ASSESSMENT
66M with DM2, HTN, CAD with stents/CABG, AF on Coumadin, hx CVA, seizure, PUD, s/p failed renal transplant now back on HD, recent hospitalization for seizures requiring intubation and course complicated by UTI, as well as need for VATS due to large hemothorax, now in rehab     #Seizure disorder: c/w phenytoin and Brivaracetam     #ESRD on HD / failed renal transplant: c/w HD as per renal, c/w immunosuppression, c/w infectious disease prophylaxis    #Anemia of chronic disease: stable, c/w EPO    #Chronic AF on Coumadin: goal INR 2-3, INR today 2.67 , c/w Coreg    #DM2 with steroid-induced hyperglycemia  A1C with Estimated Average Glucose Result: 6.4 %  C/w lantus 5units qhs, c/w premeal insulin  C/w hypoglycemia protocol    #Hypothyroid: c/w synthroid    #Right IJ DVT: coumadin 6mg tonight    #Urinary retention: c/w Proscar and Doxazosin     #Abdominal wall cellulitis: Completed course of antibiotics    #DVT ppx: Coumadin

## 2022-09-19 NOTE — PROGRESS NOTE ADULT - SUBJECTIVE AND OBJECTIVE BOX
Patient is a 66y old  Male who presents with a chief complaint of Debility (19 Sep 2022 09:50)    No events overnight  Reports had a BM on Saturday  Tolerating diet    Patient seen and examined at bedside.    ALLERGIES:  No Known Allergies    MEDICATIONS  (STANDING):  amLODIPine   Tablet 10 milliGRAM(s) Oral daily  artificial  tears Solution 2 Drop(s) Both EYES every 6 hours  brivaracetam 50 milliGRAM(s) Oral two times a day  carvedilol 12.5 milliGRAM(s) Oral every 12 hours  chlorhexidine 2% Cloths 1 Application(s) Topical <User Schedule>  dextrose 5%. 1000 milliLiter(s) (100 mL/Hr) IV Continuous <Continuous>  dextrose 5%. 1000 milliLiter(s) (50 mL/Hr) IV Continuous <Continuous>  dextrose 50% Injectable 25 Gram(s) IV Push once  dextrose 50% Injectable 12.5 Gram(s) IV Push once  dextrose 50% Injectable 25 Gram(s) IV Push once  doxazosin 4 milliGRAM(s) Oral <User Schedule>  epoetin cortney-epbx (RETACRIT) Injectable 02726 Unit(s) IV Push <User Schedule>  finasteride 5 milliGRAM(s) Oral daily  furosemide    Tablet 80 milliGRAM(s) Oral daily  glucagon  Injectable 1 milliGRAM(s) IntraMuscular once  insulin glargine Injectable (LANTUS) 5 Unit(s) SubCutaneous at bedtime  insulin lispro (ADMELOG) corrective regimen sliding scale   SubCutaneous three times a day before meals  insulin lispro (ADMELOG) corrective regimen sliding scale   SubCutaneous at bedtime  insulin lispro Injectable (ADMELOG) 3 Unit(s) SubCutaneous three times a day before meals  levothyroxine 50 MICROGram(s) Oral daily  multivitamin 1 Tablet(s) Oral daily  mycophenolate mofetil 500 milliGRAM(s) Oral <User Schedule>  nystatin    Suspension 149762 Unit(s) Oral four times a day  pantoprazole    Tablet 40 milliGRAM(s) Oral <User Schedule>  phenytoin   Capsule 200 milliGRAM(s) Oral two times a day  predniSONE   Tablet 10 milliGRAM(s) Oral daily  ramelteon 8 milliGRAM(s) Oral at bedtime  sevelamer carbonate 800 milliGRAM(s) Oral three times a day with meals  sirolimus 7 milliGRAM(s) Oral <User Schedule>  trimethoprim   80 mG/sulfamethoxazole 400 mG 1 Tablet(s) Oral daily  valGANciclovir 450 milliGRAM(s) Oral <User Schedule>  warfarin 6 milliGRAM(s) Oral once    MEDICATIONS  (PRN):  acetaminophen     Tablet .. 650 milliGRAM(s) Oral every 6 hours PRN Temp greater or equal to 38C (100.4F), Mild Pain (1 - 3)  dextrose Oral Gel 15 Gram(s) Oral once PRN Blood Glucose LESS THAN 70 milliGRAM(s)/deciliter  polyethylene glycol 3350 17 Gram(s) Oral daily PRN Constipation    Vital Signs Last 24 Hrs  T(F): 97.5 (19 Sep 2022 07:55), Max: 98.8 (19 Sep 2022 05:05)  HR: 59 (19 Sep 2022 07:55) (59 - 79)  BP: 139/64 (19 Sep 2022 07:55) (139/64 - 143/76)  RR: 16 (19 Sep 2022 07:55) (15 - 16)  SpO2: 97% (19 Sep 2022 07:55) (97% - 98%)  I&O's Summary      PHYSICAL EXAM:  General: NAD, A/O x 2, weak appearing  Chest: permacath C/D/I  ENT: MMM, no scleral icterus  Neck: Supple, No JVD, no thyroidomegaly  Lungs: Clear to auscultation bilaterally, no wheezes, no rales, no rhonchi, good inspiratory effort  Cardio: RRR, S1/S2, No murmurs  Abdomen: Soft, Nontender, Nondistended; Bowel sounds present  Extremities: No calf tenderness, No pitting edema, no skin changes    LABS:                        8.0    5.85  )-----------( 308      ( 16 Sep 2022 14:30 )             25.9       09-16    130  |  93  |  93  ----------------------------<  200  4.2   |  25  |  5.65    Ca    8.4      16 Sep 2022 14:30    TPro  6.7  /  Alb  1.9  /  TBili  0.4  /  DBili  x   /  AST  27  /  ALT  41  /  AlkPhos  582  09-16       PT/INR - ( 19 Sep 2022 06:17 )   PT: 31.3 sec;   INR: 2.67 ratio      07-27 Chol -- LDL -- HDL -- Trig 118 mg/dL    POCT Blood Glucose.: 163 mg/dL (19 Sep 2022 07:58)  POCT Blood Glucose.: 121 mg/dL (18 Sep 2022 20:25)  POCT Blood Glucose.: 134 mg/dL (18 Sep 2022 17:15)  POCT Blood Glucose.: 245 mg/dL (18 Sep 2022 12:36)    COVID-19 PCR: NotDetec (09-15-22 @ 05:41)  COVID-19 PCR: NotDetec (09-09-22 @ 07:43)  COVID-19 PCR: NotDetec (09-07-22 @ 18:29)  COVID-19 PCR: NotDetec (09-01-22 @ 07:05)  COVID-19 PCR: NotDetec (08-29-22 @ 11:09)  COVID-19 PCR: NotDetec (09-15-22 @ 05:41)  COVID-19 PCR: NotDetec (09-09-22 @ 07:43)  COVID-19 PCR: NotDetec (09-07-22 @ 18:29)  COVID-19 PCR: NotDetec (09-01-22 @ 07:05)  COVID-19 PCR: NotDetec (08-29-22 @ 11:09)  COVID-19 PCR: NotDetec (08-25-22 @ 18:36)  COVID-19 PCR: NotDetec (08-22-22 @ 06:42)  COVID-19 PCR: NotDetec (08-15-22 @ 17:07)  COVID-19 PCR: NotDetec (08-07-22 @ 18:05)  COVID-19 PCR: NotDetec (07-25-22 @ 11:51)

## 2022-09-20 NOTE — DISCHARGE NOTE PROVIDER - HOSPITAL COURSE
66 yr old Male with PMHx of DM type II, HTN, CAD s/p stents/ CABG (2020), AFib on Eliquis, CVA (2019) d/t Afib, Seizure d/o following CVA, last episode was on 4/8/22, h/o GIB (2/2022) EGD with duodenal ulcer.  ESRD due to DM was on HD since 2019, s/p premacath removal 5/10/22 s/p Right DDRT (4/21/22) and placed on belatacept.  Recent hospitalization 4/28/22 -5/10/22 with weakness/anemia found to have perinephric hematoma requiring evacuation and repair of bleeding arterial anastomosis. Was discharged to [facility], currently being treated for UTI with Levaquin, developed multiple sz episodes refractory to 5 mg versed IM (EMS) and 2 mg ativan IV in E.D. concerning for status epilepticus requiring intubation for hypoxic respiratory failure. MICU Consult was called for hypoxic respiratory failure secondary to status epilepticus. Was recently dx with klebsiella UTI - started with ertapenem which was then switched to Levaquin. He also had Parainfluenza PNA.      Patient readmitted 6/10 ED VS temp 97.8 Axillary, HR 61 RR26 100% on NRB mask. Per ED noted the patient was noted to be gurgling and was unable to protect airway, and was intubated.  EEG without Sz activity. 6/11 patient was extubated and had thoracentesis (1.3L)  for right pleural effusion - post op he needed 5U PRBC and 2 U FFP.  Was reintubated 6/11 - CT found hemothorax ~1.6 L.  S/p VATS with thoracic 2.2 L old blood removed and 2nd chest tube placed (removed 6/19).  R IJ DVT (6/24) - heparin switched to lovenox.  Noted with refractory Sx and AMS debility and placed on tube feed (7/10).    Hospital course complicated by sepsis - UTI treated with Ertapenem.  Despite abx, he had intermittent fevers.  s/p Urethral stent (7/12). Restarted HD (7/29) + 1 U PRBC.  Immunosuppressive drug switched to Sirolimus.  Noted again with refractory seizures on 8/4 - EEG with 1 seizure, Brivaracetam started (8/16).  AC switched from eliquis to coumadin (d/t 40% less efficacy while on phenytoin).    Pt was [facility]. Admitted with gait instabilty, ADL, and functional impairments.     Rehab Course significant for:  - Followed by renal for HD - now on M/W/F  - had an episode where supplemental O2 was required.  Had HD and hypoxia resolved  - Cognitive deficits - family decline speech  - Generalized fatigue d/t deconditioning  - Glucose fluctuation - lantus and admelog adjusted by hospitalist  - Followed by ID - Strongyloides antibioties positive (9/20) - started on ivermectin x 2 doses    All other medical co-morbidities fairly stable.     IDT 9/19   lives with spouse and 2 sons in  with 6 [employer], [place].  PTA needed some assistance with ADLs  Functional status: total for toileting, mod - max A with self care, needs lots of cues, posterolateral lean.  Slight/slow gains.  Mod A amb with WC follow.  Mod A x 1 using RW, min A with sit to stand to chair.  Amb 20' mod A x 1 WC follow.  Goal: min A/needs hands on A, mod A shower transfer  SLP: discharge as per family request   TDD: 9/29 need 24/7 assistance (has 6 [employer]) Home vs OK    Patient was only receiving PT/OT as requested by pt's family.  Patient has cognitive deficits unchanged x 2 years, prefer to focus on functional activities.   Made functional progress during rehab course, but by no means he's safe and at goal.  Family training completed - family feels equipped to have him home and continue with home therapy.      Pt was medically cleared on 9/29 for discharged to Home     66 yr old Male with PMHx of DM type II, HTN, CAD s/p stents/ CABG (2020), AFib on Eliquis, CVA (2019) d/t Afib, Seizure d/o following CVA, last episode was on 4/8/22, h/o GIB (2/2022) EGD with duodenal ulcer.  ESRD due to DM was on HD since 2019, s/p premacath removal 5/10/22 s/p Right DDRT (4/21/22) and placed on belatacept.  Recent hospitalization 4/28/22 -5/10/22 with weakness/anemia found to have perinephric hematoma requiring evacuation and repair of bleeding arterial anastomosis. Was discharged to [facility], currently being treated for UTI with Levaquin, developed multiple sz episodes refractory to 5 mg versed IM (EMS) and 2 mg ativan IV in E.D. concerning for status epilepticus requiring intubation for hypoxic respiratory failure. MICU Consult was called for hypoxic respiratory failure secondary to status epilepticus. Was recently dx with klebsiella UTI - started with ertapenem which was then switched to Levaquin. He also had Parainfluenza PNA.      Patient readmitted 6/10 ED VS temp 97.8 Axillary, HR 61 RR26 100% on NRB mask. Per ED noted the patient was noted to be gurgling and was unable to protect airway, and was intubated.  EEG without Sz activity. 6/11 patient was extubated and had thoracentesis (1.3L)  for right pleural effusion - post op he needed 5U PRBC and 2 U FFP.  Was reintubated 6/11 - CT found hemothorax ~1.6 L.  S/p VATS with thoracic 2.2 L old blood removed and 2nd chest tube placed (removed 6/19).  R IJ DVT (6/24) - heparin switched to lovenox.  Noted with refractory Sx and AMS debility and placed on tube feed (7/10).    Hospital course complicated by sepsis - UTI treated with Ertapenem.  Despite abx, he had intermittent fevers.  s/p Urethral stent (7/12). Restarted HD (7/29) + 1 U PRBC.  Immunosuppressive drug switched to Sirolimus.  Noted again with refractory seizures on 8/4 - EEG with 1 seizure, Brivaracetam started (8/16).  AC switched from eliquis to coumadin (d/t 40% less efficacy while on phenytoin).    Pt was stable upon rehab admission [facility]. Admitted with gait instabilty, ADL, and functional impairments.     Rehab Course significant for:  - Followed by renal for HD - now on M/W/F  - had an episode where supplemental O2 was required.  Had HD and hypoxia resolved  - Cognitive deficits - family decline speech  - Generalized fatigue d/t deconditioning  - Glucose fluctuation - lantus and admelog adjusted by hospitalist  - Followed by ID - Strongyloides antibioties positive (9/20) - started on ivermectin x 2 doses    All other medical co-morbidities fairly stable.     IDT 9/26   lives with spouse and 2 sons in  with 6 [facility], [facility] setup.  PTA needed some assistance with ADLs  Functional status: mod A x 1 with stand piovt with and wo RW (needs cutes), mod A to initiate task.  Eating with incidental Assist.  Transfers: sit->stand min A, but mod A + cues with stand to sit, mod A to WC. Ambulated 50' RW min + WCF  Goal: min A/needs hands on A, mod A shower transfer  SLP: discharge as per family   TDD: 9/29 Home need 24/7 assistance.  Family putting in ramp on Wednesday (9/28)    Patient was only receiving PT/OT as requested by pt's family.  Patient has cognitive deficits unchanged x 2 years, prefer to focus on functional activities.   Made functional progress during rehab course, but by no means he's safe and at goal.  Family training completed - family feels equipped to have him home and continue with home therapy.      Pt was medically cleared on 9/29 for discharged to Home     66 yr old Male with PMHx of DM type II, HTN, CAD s/p stents/ CABG (2020), AFib on Eliquis, CVA (2019) d/t Afib, Seizure d/o following CVA, last episode was on 4/8/22, h/o GIB (2/2022) EGD with duodenal ulcer.  ESRD due to DM was on HD since 2019, s/p premacath removal 5/10/22 s/p Right DDRT (4/21/22) and placed on belatacept.  Recent hospitalization 4/28/22 -5/10/22 with weakness/anemia found to have perinephric hematoma requiring evacuation and repair of bleeding arterial anastomosis. Was discharged to [facility], currently being treated for UTI with Levaquin, developed multiple sz episodes refractory to 5 mg versed IM (EMS) and 2 mg ativan IV in E.D. concerning for status epilepticus requiring intubation for hypoxic respiratory failure. MICU Consult was called for hypoxic respiratory failure secondary to status epilepticus. Was recently dx with klebsiella UTI - started with ertapenem which was then switched to Levaquin. He also had Parainfluenza PNA.      Patient readmitted 6/10 ED VS temp 97.8 Axillary, HR 61 RR26 100% on NRB mask. Per ED noted the patient was noted to be gurgling and was unable to protect airway, and was intubated.  EEG without Sz activity. 6/11 patient was extubated and had thoracentesis (1.3L)  for right pleural effusion - post op he needed 5U PRBC and 2 U FFP.  Was reintubated 6/11 - CT found hemothorax ~1.6 L.  S/p VATS with thoracic 2.2 L old blood removed and 2nd chest tube placed (removed 6/19).  R IJ DVT (6/24) - heparin switched to lovenox.  Noted with refractory Sx and AMS debility and placed on tube feed (7/10).    Hospital course complicated by sepsis - UTI treated with Ertapenem.  Despite abx, he had intermittent fevers.  s/p Urethral stent (7/12). Restarted HD (7/29) + 1 U PRBC.  Immunosuppressive drug switched to Sirolimus.  Noted again with refractory seizures on 8/4 - EEG with 1 seizure, Brivaracetam started (8/16).  AC switched from eliquis to coumadin (d/t 40% less efficacy while on phenytoin).    Pt was stable upon rehab admission to  [facility]. Admitted with gait instabilty, ADL, and functional impairments.     Rehab Course significant for:  - Followed by renal for HD 3 days a week  - had an episode where supplemental O2 was required.  Had HD and hypoxia resolved  - Cognitive deficits - family decline speech  - Generalized fatigue d/t deconditioning  - Glucose fluctuation - lantus and admelog adjusted by hospitalist  - Followed by ID - Strongyloides antibioties positive (9/20) - started on ivermectin x 1 doses  - Anemia - hgb downtrend, FOBT positive.  Seen by GI, had Colonoscopy 10/3 which showed gastritis and small hemorrhoids.  Biopsy was taken during procedure    All other medical co-morbidities fairly stable.     IDT 10/3  lives with spouse and 2 sons in  with 6 [employer], [place].  PTA needed some assistance with ADLs  Functional status: ADLs min-mod A with max cutes, sequencing, hand placement, transferring min A RW, amb 50' RW min-mod A   Goal: min A/needs hands on A, mod A shower transfer  TDD: 10/5 home ---- awaiting for seizure medication approval       Patient was only receiving PT/OT as requested by pt's family.  Patient has cognitive deficits unchanged x 2 years, prefer to focus on functional activities.   Made functional progress during rehab course, but by no means he's safe and at goal.  Family training completed - family feels equipped to have him home and continue with home therapy.      Pt was medically cleared on 9/29 for discharged to Home     66 yr old Male with PMHx of DM type II, HTN, CAD s/p stents/ CABG (2020), AFib on Eliquis, CVA (2019) d/t Afib, Seizure d/o following CVA, last episode was on 4/8/22, h/o GIB (2/2022) EGD with duodenal ulcer.  ESRD due to DM was on HD since 2019, s/p premacath removal 5/10/22 s/p Right DDRT (4/21/22) and placed on belatacept.  Recent hospitalization 4/28/22 -5/10/22 with weakness/anemia found to have perinephric hematoma requiring evacuation and repair of bleeding arterial anastomosis. Was discharged to [facility], currently being treated for UTI with Levaquin, developed multiple sz episodes refractory to 5 mg versed IM (EMS) and 2 mg ativan IV in E.D. concerning for status epilepticus requiring intubation for hypoxic respiratory failure. MICU Consult was called for hypoxic respiratory failure secondary to status epilepticus. Was recently dx with klebsiella UTI - started with ertapenem which was then switched to Levaquin. He also had Parainfluenza PNA.      Patient readmitted 6/10 ED VS temp 97.8 Axillary, HR 61 RR26 100% on NRB mask. Per ED noted the patient was noted to be gurgling and was unable to protect airway, and was intubated.  EEG without Sz activity. 6/11 patient was extubated and had thoracentesis (1.3L)  for right pleural effusion - post op he needed 5U PRBC and 2 U FFP.  Was reintubated 6/11 - CT found hemothorax ~1.6 L.  S/p VATS with thoracic 2.2 L old blood removed and 2nd chest tube placed (removed 6/19).  R IJ DVT (6/24) - heparin switched to lovenox.  Noted with refractory Sx and AMS debility and placed on tube feed (7/10).    Hospital course complicated by sepsis - UTI treated with Ertapenem.  Despite abx, he had intermittent fevers.  s/p Urethral stent (7/12). Restarted HD (7/29) + 1 U PRBC.  Immunosuppressive drug switched to Sirolimus.  Noted again with refractory seizures on 8/4 - EEG with 1 seizure, Brivaracetam started (8/16).  AC switched from eliquis to coumadin (d/t 40% less efficacy while on phenytoin).    Pt was stable upon rehab admission to  [facility]. Admitted with gait instabilty, ADL, and functional impairments.     Rehab Course significant for:  - Followed by renal for HD 3 days a week  - had an episode where supplemental O2 was required.  Had HD and hypoxia resolved  - Cognitive deficits - family decline speech  - Generalized fatigue d/t deconditioning  - Glucose fluctuation - lantus and admelog adjusted by hospitalist  - Followed by ID - Strongyloides antibioties positive (9/20) - started on ivermectin x 1 doses  - Anemia - hgb downtrend, FOBT positive.  Seen by GI, had Colonoscopy 10/3 which showed gastritis and small hemorrhoids.  Biopsy was taken during procedure    All other medical co-morbidities fairly stable.     IDT 10/3  lives with spouse and 2 sons in  with 6 [employer], [place] setup.  PTA needed some assistance with ADLs  Functional status: ADLs min-mod A with max cutes, sequencing, hand placement, transferring min A RW, amb 50' RW min-mod A   Goal: min A/needs hands on A, mod A shower transfer  TDD: 10/5 home ---- awaiting for seizure medication approval       Patient was only receiving PT/OT as requested by pt's family.  Patient has cognitive deficits unchanged x 2 years, prefer to focus on functional activities.   Made functional progress during rehab course, but by no means he's safe and at goal.  Family training completed - family feels equipped to have him home and continue with home therapy.      Pt was medically cleared on 10/5 for discharged to Home     66 yr old Male with PMHx of DM type II, HTN, CAD s/p stents/ CABG (2020), AFib on Eliquis, CVA (2019) d/t Afib, Seizure d/o following CVA, last episode was on 4/8/22, h/o GIB (2/2022) EGD with duodenal ulcer.  ESRD due to DM was on HD since 2019, s/p premacath removal 5/10/22 s/p Right DDRT (4/21/22) and placed on belatacept.  Recent hospitalization 4/28/22 -5/10/22 with weakness/anemia found to have perinephric hematoma requiring evacuation and repair of bleeding arterial anastomosis. Was discharged to [facility]", currently being treated for UTI with Levaquin, developed multiple sz episodes refractory to 5 mg versed IM (EMS) and 2 mg ativan IV in E.D. concerning for status epilepticus requiring intubation for hypoxic respiratory failure. MICU Consult was called for hypoxic respiratory failure secondary to status epilepticus. Was recently dx with klebsiella UTI - started with ertapenem which was then switched to Levaquin. He also had Parainfluenza PNA.      Patient readmitted 6/10 ED VS temp 97.8 Axillary, HR 61 RR26 100% on NRB mask. Per ED noted the patient was noted to be gurgling and was unable to protect airway, and was intubated.  EEG without Sz activity. 6/11 patient was extubated and had thoracentesis (1.3L)  for right pleural effusion - post op he needed 5U PRBC and 2 U FFP.  Was reintubated 6/11 - CT found hemothorax ~1.6 L.  S/p VATS with thoracic 2.2 L old blood removed and 2nd chest tube placed (removed 6/19).  R IJ DVT (6/24) - heparin switched to lovenox.  Noted with refractory Sx and AMS debility and placed on tube feed (7/10).    Hospital course complicated by sepsis - UTI treated with Ertapenem.  Despite abx, he had intermittent fevers.  s/p Urethral stent (7/12). Restarted HD (7/29) + 1 U PRBC.  Immunosuppressive drug switched to Sirolimus.  Noted again with refractory seizures on 8/4 - EEG with 1 seizure, Brivaracetam started (8/16).  AC switched from eliquis to coumadin (d/t 40% less efficacy while on phenytoin).    Pt was stable upon rehab admission to  [facility]. Admitted with gait instabilty, ADL, and functional impairments.     Rehab Course significant for:  - Followed by renal for HD 3 days a week, for failed renal allograph transplant  - Had an episode where supplemental O2 was required. but successfully weaned off this  - Cognitive deficits - No appreciable response to therapy, family reports that his cognitive deficits are chronic, hence that asked to decline further speech/language therapy  - Generalized fatigue d/t deconditioning  - Glucose fluctuation - lantus and admelog adjusted by hospitalist  - Followed by ID - Strongyloides antibioties positive (9/20) - started on ivermectin x 1 doses  - Anemia - hgb downtrend, FOBT positive.  Seen by GI, had Colonoscopy 10/3 which showed gastritis and small hemorrhoids.  Biopsy was taken during procedure  but no bleeding vessels  -He made some gradual progress with improvement of motor function, ambulating with walker, limited distance  Oral intake improved, but requiring set up for feeding  Family made provision for supervision at home  -Rehab treatment was done in liaison with transplant team, GI and other specialists  -Clinically stable on discharge  All other medical co-morbidities fairly stable.     IDT 10/3  lives with spouse and 2 sons in  with 6 [facility] setup.  PTA needed some assistance with ADLs  Functional status: ADLs min-mod A with max cutes, sequencing, hand placement, transferring min A RW, amb 50' RW min-mod A   Goal: min A/needs hands on A, mod A shower transfer  TDD: 10/5 home ---- awaiting for seizure medication approval     Patient was only receiving PT/OT as requested by pt's family.  Patient has cognitive deficits unchanged x 2 years, prefer to focus on functional activities.   Made functional progress during rehab course, but by no means he's safe and at goal.  Family training completed - family feels equipped to have him home and continue with home therapy.      Pt was medically cleared on 10/5 for discharged to Home

## 2022-09-20 NOTE — PROGRESS NOTE ADULT - SUBJECTIVE AND OBJECTIVE BOX
No distress    Vital Signs Last 24 Hrs  T(C): 36.4 (09-20-22 @ 20:45), Max: 36.4 (09-20-22 @ 09:11)  T(F): 97.5 (09-20-22 @ 20:45), Max: 97.5 (09-20-22 @ 09:11)  HR: 62 (09-20-22 @ 20:45) (60 - 62)  BP: 164/64 (09-20-22 @ 20:45) (152/64 - 164/72)  RR: 18 (09-20-22 @ 20:45) (15 - 18)  SpO2: 98% (09-20-22 @ 20:45) (98% - 98%)    s1s2  b/l air entry  soft, ND  no edema                                                 8.1    5.28  )-----------( 292      ( 19 Sep 2022 14:15 )             26.3     19 Sep 2022 14:15    131    |  91     |  108    ----------------------------<  131    4.1     |  26     |  6.46     Ca    8.6        19 Sep 2022 14:15  Phos  6.2       19 Sep 2022 14:15    TPro  6.9    /  Alb  2.0    /  TBili  0.4    /  DBili  x      /  AST  32     /  ALT  41     /  AlkPhos  656    19 Sep 2022 14:15    LIVER FUNCTIONS - ( 19 Sep 2022 14:15 )  Alb: 2.0 g/dL / Pro: 6.9 g/dL / ALK PHOS: 656 U/L / ALT: 41 U/L / AST: 32 U/L / GGT: x           PT/INR - ( 20 Sep 2022 06:38 )   PT: 30.6 sec;   INR: 2.61 ratio      acetaminophen     Tablet .. 650 milliGRAM(s) Oral every 6 hours PRN  amLODIPine   Tablet 10 milliGRAM(s) Oral daily  artificial  tears Solution 2 Drop(s) Both EYES every 6 hours  brivaracetam 50 milliGRAM(s) Oral <User Schedule>  brivaracetam 50 milliGRAM(s) Oral <User Schedule>  carvedilol 12.5 milliGRAM(s) Oral every 12 hours  chlorhexidine 2% Cloths 1 Application(s) Topical <User Schedule>  dextrose 5%. 1000 milliLiter(s) IV Continuous <Continuous>  dextrose 5%. 1000 milliLiter(s) IV Continuous <Continuous>  dextrose 50% Injectable 25 Gram(s) IV Push once  dextrose 50% Injectable 12.5 Gram(s) IV Push once  dextrose 50% Injectable 25 Gram(s) IV Push once  dextrose Oral Gel 15 Gram(s) Oral once PRN  doxazosin 4 milliGRAM(s) Oral <User Schedule>  epoetin cortney-epbx (RETACRIT) Injectable 33580 Unit(s) IV Push <User Schedule>  finasteride 5 milliGRAM(s) Oral daily  furosemide    Tablet 80 milliGRAM(s) Oral daily  glucagon  Injectable 1 milliGRAM(s) IntraMuscular once  insulin glargine Injectable (LANTUS) 5 Unit(s) SubCutaneous at bedtime  insulin lispro (ADMELOG) corrective regimen sliding scale   SubCutaneous three times a day before meals  insulin lispro (ADMELOG) corrective regimen sliding scale   SubCutaneous at bedtime  insulin lispro Injectable (ADMELOG) 3 Unit(s) SubCutaneous three times a day before meals  ivermectin 12 milliGRAM(s) Oral daily  levothyroxine 50 MICROGram(s) Oral daily  multivitamin 1 Tablet(s) Oral daily  mycophenolate mofetil 500 milliGRAM(s) Oral <User Schedule>  nystatin    Suspension 038487 Unit(s) Oral four times a day  pantoprazole    Tablet 40 milliGRAM(s) Oral <User Schedule>  phenytoin   Capsule 200 milliGRAM(s) Oral two times a day  polyethylene glycol 3350 17 Gram(s) Oral daily PRN  predniSONE   Tablet 10 milliGRAM(s) Oral daily  ramelteon 8 milliGRAM(s) Oral at bedtime  sevelamer carbonate 800 milliGRAM(s) Oral three times a day with meals  sirolimus 7 milliGRAM(s) Oral <User Schedule>  trimethoprim   80 mG/sulfamethoxazole 400 mG 1 Tablet(s) Oral daily  valGANciclovir 450 milliGRAM(s) Oral <User Schedule>  warfarin 6 milliGRAM(s) Oral once    A/P:    S/p complicated hospital course as per HPI  Now in [facility]  S/p DDRT 4/21/22, required HD, came off HD since 5/5/22, back on HD since 7/27/22  Will continue transplant meds per transplant team recommendations  Avoid nephrotoxins  HD MWF  Epoetin w/HD 3 x week  TMP as able  Renal diet  Bld work w/each HD   Rehab    [phone]

## 2022-09-20 NOTE — DISCHARGE NOTE PROVIDER - PROVIDER TOKENS
PROVIDER:[TOKEN:[[id number]],FOLLOWUP:[1 week]],PROVIDER:[TOKEN:[[id number]],FOLLOWUP:[2 weeks]],PROVIDER:[TOKEN:[[id number]],FOLLOWUP:[1 month]],PROVIDER:[TOKEN:[[id number]],FOLLOWUP:[2 weeks]],PROVIDER:[TOKEN:[[id number]],FOLLOWUP:[1 week]],PROVIDER:[TOKEN:[[id number]],FOLLOWUP:[1 week]] PROVIDER:[TOKEN:[[id number]],FOLLOWUP:[1 week]],PROVIDER:[TOKEN:[[id number]],FOLLOWUP:[1 month]],PROVIDER:[TOKEN:[[id number]],FOLLOWUP:[2 weeks]],PROVIDER:[TOKEN:[[id number]],FOLLOWUP:[1 week]],PROVIDER:[TOKEN:[[id number]],FOLLOWUP:[1 week]]

## 2022-09-20 NOTE — PROGRESS NOTE ADULT - ASSESSMENT
66 yr old Male with PMHx of DM type II, HTN, CAD s/p stents/ CABG (2020), AFib on Eliquis, CVA (2019) d/t Afib, Seizure d/o following CVA, last episode was on 4/8/22, h/o GIB (2/2022) EGD with duodenal ulcer.  ESRD due to DM was on HD since 2019, s/p premacath removal 5/10/22 s/p Right DDRT (4/21/22) and placed on belatacept.  Frequent hospitalization d/t weakness, refractory sz, and sepsis.  Admitted and intubated 6/10, found to large pleural effusion s/p thoracentesis ~1.3 and VATS of thoracic 2.2 L.      TRANSPLANT PATIENT -  * INR 2.61 (9/20) - coumadin 6mg for today  * transplant team PHONE  --Iker Alfred (MD) ??  Dr. Eduardo Barber (transplant surgeon) - Darleen Dior NP [phone]*  * Barby NP - recommendations for labs: BK virus DNA (9/15 - negative), lactate (367), mag (2.0), parathyroid (202), phosphorus (2.8), CRP (230), uric acid (3.2), and UA (oliguria).  CMV pcr (9/15 - negative)  * HD M/W/F - need additional brivaracetam after HD    COMORBIDITES/ACTIVE MEDICAL ISSUES   Gait Instability, ADL impairments and Functional impairments secondary to recurrent UTI, status epilepticus, with debility seizures. 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  Belatacept d/t 8/1 and was started on Sirolimus 7mg QD  - Prednisone 10  - Cellcept 250 BID on hold  - S/p perma cath placement 8/30/2022  - PPx medications: Nystatin, Bactrim, Valcyte (MWF)  - Nephrology following - modified to [facility]    #Anemia of chronic disease  - Has gotten total of 6U PRBC and 2 U FFP during acute hospital   - monitor H/H 8.0 (9/12) - 8.0 (9/16) - repeat CBC 9/19 with HD    #HTN  #Afib  - Eliquis switched to Coumadin  - Coreg   - Daily INR til therapeutic on a consistent coumadin dose    #Diabetes  - A1C  - Lantus 3U -> inc 10 U  - standing Admelog dc, restarted 9/16 @ 3U with meals  - FS + ISS  - Hospitalist following   - FS fluctuates     #Hypothyroidism  - Synthroid 37.5 mcg    #Pain control  - Tylenol PRN    #GI/Bowel Mgmt   - At risk for constipation due to neurologic diagnosis, immobility and/or medication use  - Senna d/c d/t loose BM 9/11  -  Miralax PRN    #Bladder management  - incontinence     #DVT   - acute DVT to R IJ thrombosis, Superficial vein thrombus to right basilic and cephalic vein  - Coumadin  - SCDs, TEDs     #Skin:  -Stage 2 pressure ulcer to sacrum 0.3 x 0.2- cleanse with NS, apply wet gauze andcover with allevyn foam dressing  -At risk for pressure injury due to neurologic diagnosis and relative immobility  -Bilateral heels - soft heel protectors, apply liquid barrier film daily and monitor for tissue type changes  - Turn Q2 hr while in bed, air mattress  - Skin barrier cream as needed  - Nursing to monitor skin Qshift    Diet  - Regular/easy to chew + Thins  [[facility]HO, renal diet]    + Fluid Restriction: 1L    Precautions / PROPHYLAXIS:   - Falls, Cardiac, Seizure   - ortho: Weight bearing status: WBAT   - Lungs: Aspiration, Incentive Spirometer   - Pressure injury/Skin: Turn Q2hrs while in bed, OOB to Chair, PT/OT      liaison with family 9/12--Spoke with son Dr Perez  He gave update on patient's clinical state, investigation results and function prior to acute rehab treatment   I gave update on patients current status, and he was happy with same  Plan to to give interval updates to family as needed    IDT 9/19 - Lead by Dr. Yip  lives with spouse and 2 sons in  with 6 JARRETT, 1st FL setup.  PTA needed some assistance with ADLs  Functional status: total for toileting, mod - max A with self care, needs lots of cues, posterolateral lean.  Slight/slow gains.  Mod A amb with WC follow.  Mod A x 1 using RW, min A with sit to stand to chair.  Amb 20' mod A x 1 WC follow.  Goal: min A/needs hands on A, mod A shower transfer  SLP: discharge as per family request   TDD: 9/29 need 24/7 assistance (has 6 JARRETT) Home vs OK    Follow ups:  Andre Patterson)  Clinical Neurophysiology; EEGEpilepsy; Neurology  [address], [address]  [place] [address]  Phone: [phone]  Fax: [phone]  Follow Up Time:     Padmini Lincoln ()  Internal Medicine  [address], [address]  [place] [address]  Phone: [phone]  Fax: [phone]  Follow Up Time:     Iker Alfred)  Internal Medicine; Nephrology  [address] [place] [address]  Phone: [phone]  Fax: [phone]  Follow Up Time:    Dr. Eduardo Barber - Organ Transplant    Iker Alfred  [facility]  NEPHRO [address] [place] [address] D  Scheduled Appointment: 09/20/2022    Iker Alfred  [facility]  NEPHRO [address] [place] [address] D  Scheduled Appointment: 10/20/2022

## 2022-09-20 NOTE — DISCHARGE NOTE PROVIDER - CARE PROVIDER_API CALL
Iker Alfred (MD)  Internal Medicine; Nephrology  [address] [place] [address]  Phone: [phone]  Fax: [phone]  Follow Up Time: 1 week    Eduardo Barber)  Surgery  Organ Transplant  [address] [place] [address]  Phone: [phone]  Fax: [phone]  Follow Up Time: 2 weeks    Marcellus Yip; MPH)  Surgery  [facility]  [address] [place] [address]  Phone: [phone]  Fax: [phone]  Follow Up Time: 1 month    Padmini Lincoln (DO)  Internal Medicine  [address] [facility] [facility] [address]  [place] [address]  Phone: [phone]  Fax: [phone]  Follow Up Time: 2 weeks    Andre Patterson)  Clinical Neurophysiology; EEGEpilepsy; Neurology  [address] [facility], [facility] [address]  [place] [address]  Phone: [phone]  Fax: [phone]  Follow Up Time: 1 week    Marianne Flores)  Internal Medicine  [address] [place] [address]  Phone: [phone]  Fax: [phone]  Follow Up Time: 1 week   Iker Alfred (MD)  Internal Medicine; Nephrology  [address] [place] [address]  Phone: [phone]  Fax: [phone]  Follow Up Time: 1 week    Marcellus Yip; MPH)  Surgery  Phys Medicine  [address]  [address] [place] [address]  Phone: [phone]  Fax: [phone]  Follow Up Time: 1 month    Padmini Lincoln ()  Internal Medicine  [address] [facility] [address]  [place] [address]  Phone: [phone]  Fax: [phone]  Follow Up Time: 2 weeks    Andre Patterson)  Clinical Neurophysiology; EEGEpilepsy; Neurology  [address] [facility], [address]  [place] [address]  Phone: [phone]  Fax: [phone]  Follow Up Time: 1 week    Marianne Flores)  Internal Medicine  [address] [place] [address]  Phone: [phone]  Fax: [phone]  Follow Up Time: 1 week

## 2022-09-20 NOTE — PROGRESS NOTE ADULT - SUBJECTIVE AND OBJECTIVE BOX
SUBJECTIVE/ROS:  Patient seen and evaluated sitting up in WC at bedside  Awake, slept well and doesn't feel tired  Tolerating therapy as best as he can (PT/OT)  Denies CP, palpitation, SOB, or cough  No HA, dizziness, or lightheadedness  no nausea or abd pain.  LBM      Vital Signs Last 24 Hrs  T(C): 36.4 (20 Sep 2022 09:11), Max: 36.8 (19 Sep 2022 17:10)  T(F): 97.5 (20 Sep 2022 09:11), Max: 98.2 (19 Sep 2022 17:10)  HR: 60 (20 Sep 2022 09:11) (56 - 75)  BP: 152/64 (20 Sep 2022 09:11) (134/75 - 174/69)  RR: 15 (20 Sep 2022 09:11) (15 - 18)  SpO2: 98% (20 Sep 2022 09:11) (95% - 98%)  on room air    Daily -  Admission weight () 69.8 kg       Daily Weight in k (20 Sep 2022 05:24)    Gen - NAD, comfortable on RA  HEENT - NCAT, EOMI, MMM  Neck - Supple, No limited ROM  Pulm - resp non labored  Cardiovascular - warm and well perfused  Chest - left chest wall permcath  Abdomen -  semi tympanic, NT  Extremities - No Cyanosis, no clubbing, minimal edema to b/l ankles, no calf tenderness  Neuro-     Cognitive - awake, alert, oriented to person and place. Delayed processing/response     Cranial Nerves - CN 2-12 intact. No facial asymmetry, Tongue midline, EOMI, Shoulder shrug intact     Motor -                     LEFT    UE - 3+5                    RIGHT UE - 3-/5                    LEFT    LE - 3+/5                    RIGHT LE - 3-/5        Sensory - Intact to LT bilaterally  MSK: generalized weakness  Skin:  stage 2 pressure ulcer to sacrum 0.3 x 0.2,  discoloration to lateral RLQ of abdomen    RECENT LABS:                      8.1    5.28  )-----------( 292      ( 19 Sep 2022 14:15 )             26.3     09-19    131<L>  |  91<L>  |  108<H>  ----------------------------<  131<H>  4.1   |  26  |  6.46<H>    Ca    8.6      19 Sep 2022 14:15  Phos  6.2         TPro  6.9  /  Alb  2.0<L>  /  TBili  0.4  /  DBili  x   /  AST  32  /  ALT  41  /  AlkPhos  656<H>      LIVER FUNCTIONS - ( 19 Sep 2022 14:15 )  Alb: 2.0 g/dL / Pro: 6.9 g/dL / ALK PHOS: 656 U/L / ALT: 41 U/L / AST: 32 U/L / GGT: x           PT/INR - ( 20 Sep 2022 06:38 )   PT: 30.6 sec;   INR: 2.61 ratio       Xray Chest 1 View- PORTABLE-Routine (Xray Chest 1 View- PORTABLE-Routine .) (22 @ 12:04) >  Interval exchange of the dialysis catheter. No evidence of pneumothorax.   No other significant change.    Allergies  No Known Allergies    MEDICATIONS  (STANDING):  amLODIPine   Tablet 10 milliGRAM(s) Oral daily  artificial  tears Solution 2 Drop(s) Both EYES every 6 hours  brivaracetam 50 milliGRAM(s) Oral <User Schedule>  brivaracetam 50 milliGRAM(s) Oral <User Schedule>  carvedilol 12.5 milliGRAM(s) Oral every 12 hours  chlorhexidine 2% Cloths 1 Application(s) Topical <User Schedule>  dextrose 5%. 1000 milliLiter(s) (50 mL/Hr) IV Continuous <Continuous>  dextrose 5%. 1000 milliLiter(s) (100 mL/Hr) IV Continuous <Continuous>  dextrose 50% Injectable 25 Gram(s) IV Push once  dextrose 50% Injectable 12.5 Gram(s) IV Push once  dextrose 50% Injectable 25 Gram(s) IV Push once  doxazosin 4 milliGRAM(s) Oral <User Schedule>  epoetin cortney-epbx (RETACRIT) Injectable 84908 Unit(s) IV Push <User Schedule>  finasteride 5 milliGRAM(s) Oral daily  furosemide    Tablet 80 milliGRAM(s) Oral daily  glucagon  Injectable 1 milliGRAM(s) IntraMuscular once  insulin glargine Injectable (LANTUS) 5 Unit(s) SubCutaneous at bedtime  insulin lispro (ADMELOG) corrective regimen sliding scale   SubCutaneous three times a day before meals  insulin lispro (ADMELOG) corrective regimen sliding scale   SubCutaneous at bedtime  insulin lispro Injectable (ADMELOG) 3 Unit(s) SubCutaneous three times a day before meals  levothyroxine 50 MICROGram(s) Oral daily  multivitamin 1 Tablet(s) Oral daily  mycophenolate mofetil 500 milliGRAM(s) Oral <User Schedule>  nystatin    Suspension 372612 Unit(s) Oral four times a day  pantoprazole    Tablet 40 milliGRAM(s) Oral <User Schedule>  phenytoin   Capsule 200 milliGRAM(s) Oral two times a day  predniSONE   Tablet 10 milliGRAM(s) Oral daily  ramelteon 8 milliGRAM(s) Oral at bedtime  sevelamer carbonate 800 milliGRAM(s) Oral three times a day with meals  sirolimus 7 milliGRAM(s) Oral <User Schedule>  trimethoprim   80 mG/sulfamethoxazole 400 mG 1 Tablet(s) Oral daily  valGANciclovir 450 milliGRAM(s) Oral <User Schedule>  warfarin 6 milliGRAM(s) Oral once    MEDICATIONS  (PRN):  acetaminophen     Tablet .. 650 milliGRAM(s) Oral every 6 hours PRN Temp greater or equal to 38C (100.4F), Mild Pain (1 - 3)  dextrose Oral Gel 15 Gram(s) Oral once PRN Blood Glucose LESS THAN 70 milliGRAM(s)/deciliter  polyethylene glycol 3350 17 Gram(s) Oral daily PRN Constipation

## 2022-09-20 NOTE — DISCHARGE NOTE PROVIDER - NSDCFUSCHEDAPPT_GEN_ALL_CORE_FT
Iker Alfred  [facility]  NEPHRO [address] [place] [address]  Scheduled Appointment: 09/21/2022    Iker Alfred  [facility]  [address]PH[address]  Scheduled Appointment: 10/20/2022     Iker Alfred  [facility]  NEPHRO [address] [place] [address]  Scheduled Appointment: 10/20/2022     [facility]  TRANSPLANT [address]   Scheduled Appointment: 10/13/2022    Iker Alfred  [facility]  NEPHRO [address] [facility] [address]  Scheduled Appointment: 10/19/2022    Andre Patterson  [facility]  NEUROLOGY [address] [place] [address]  Scheduled Appointment: 11/14/2022

## 2022-09-20 NOTE — DISCHARGE NOTE PROVIDER - CARE PROVIDERS DIRECT ADDRESSES
[email or web address]_[email or web address]

## 2022-09-20 NOTE — DISCHARGE NOTE PROVIDER - NSDCMRMEDTOKEN_GEN_ALL_CORE_FT
amLODIPine 10 mg oral tablet: 1 tab(s) orally once a day  brivaracetam 50 mg oral tablet: 1 tab(s) orally 2 times a day  give additional 50 mg post HD  carvedilol 12.5 mg oral tablet: 1 tab(s) orally every 12 hours  doxazosin 4 mg oral tablet: 1 tab(s) orally 2 times a day  epoetin cortney: 59675 unit(s) intravenous 3 times a week  with HD  finasteride 5 mg oral tablet: 1 tab(s) orally once a day  furosemide 80 mg oral tablet: 1 tab(s) orally once a day  insulin glargine 100 units/mL subcutaneous solution: 3 unit(s) subcutaneous once a day (at bedtime)  insulin lispro 100 units/mL injectable solution: 10 unit(s) injectable 3 times a day (before meals)    10 units before breakfast  8 units before lunch  8 units before dinner  levothyroxine 50 mcg (0.05 mg) oral tablet: 1 tab(s) orally once a day  Multiple Vitamins oral tablet: 1 tab(s) orally once a day  mycophenolate mofetil 250 mg oral capsule: 2 cap(s) orally 2 times a day  nystatin 100,000 units/mL oral suspension: 5 milliliter(s) orally 4 times a day  ocular lubricant ophthalmic solution: 2 drop(s) to each affected eye every 6 hours  pantoprazole 40 mg oral delayed release tablet: 1 tab(s) orally once a day  phenytoin: 200 milligram(s) orally 2 times a day   polyethylene glycol 3350 oral powder for reconstitution: 17 gram(s) orally once a day  predniSONE 10 mg oral tablet: 1 tab(s) orally once a day  senna leaf extract oral tablet: 2 tab(s) orally once a day (at bedtime)  sirolimus 1 mg oral tablet: 7 tab(s) orally once a day  sulfamethoxazole-trimethoprim 400 mg-80 mg oral tablet: 1 tab(s) orally once a day  valGANciclovir 450 mg oral tablet: 1 tab(s) orally 3 times a week  M/W/F  warfarin 4 mg oral tablet: 1 tab(s) orally once a day     TITRATE BASED ON INR   acetaminophen 325 mg oral tablet: 2 tab(s) orally every 6 hours, As needed, Temp greater or equal to 38C (100.4F), Mild Pain (1 - 3)  Admelog SoloStar 100 units/mL injectable solution: 5 unit(s) subcutaneous 3 times a day (with meals)   amLODIPine 10 mg oral tablet: 1 tab(s) orally once a day  brivaracetam 50 mg oral tablet: 1 tab(s) orally 2 times a day MDD:100  brivaracetam 50 mg oral tablet: 1 tab(s) orally 3 times a week - after dialysis (Mon/wed/fri)MDD:100mg   carvedilol 12.5 mg oral tablet: 1 tab(s) orally every 12 hours  doxazosin 4 mg oral tablet: 1 tab(s) orally 2 times a day   epoetin cortney: 05632 unit(s) intravenous 3 times a week  with HD  finasteride 5 mg oral tablet: 1 tab(s) orally once a day  furosemide 80 mg oral tablet: 1 tab(s) orally once a day  insulin glargine 100 units/mL subcutaneous solution: 5 unit(s) subcutaneous once a day (at bedtime)  levothyroxine 50 mcg (0.05 mg) oral tablet: 1 tab(s) orally once a day  Multiple Vitamins oral tablet: 1 tab(s) orally once a day  mycophenolate mofetil 250 mg oral capsule: 500 milligram(s) orally 2 times a day  ocular lubricant ophthalmic solution: 2 drop(s) to each affected eye every 6 hours, As Needed  pantoprazole 40 mg oral delayed release tablet: 1 tab(s) orally once a day before breakfast  phenytoin 200 mg oral capsule, extended release: 1 cap(s) orally 2 times a day  polyethylene glycol 3350 oral powder for reconstitution: 17 gram(s) orally once a day, As needed, Constipation  predniSONE 10 mg oral tablet: 1 tab(s) orally once a day  ramelteon 8 mg oral tablet: 1 tab(s) orally once a day (at bedtime)  senna leaf extract oral tablet: 2 tab(s) orally once a day (at bedtime), As Needed  sevelamer carbonate 800 mg oral tablet: 1 tab(s) orally 3 times a day (with meals)  sirolimus 1 mg oral tablet: 7 tab(s) orally once a day   sulfamethoxazole-trimethoprim 400 mg-80 mg oral tablet: 1 tab(s) orally once a day  valGANciclovir 450 mg oral tablet: 1 tab(s) orally 3 times a week (mon/wed/fri)  warfarin 5 mg oral tablet: 1 tab(s) orally once a day (in the evening)    acetaminophen 325 mg oral tablet: 2 tab(s) orally every 6 hours, As needed, Temp greater or equal to 38C (100.4F), Mild Pain (1 - 3)  Admelog SoloStar 100 units/mL injectable solution: 5 unit(s) subcutaneous 3 times a day (with meals)   amLODIPine 10 mg oral tablet: 1 tab(s) orally once a day  brivaracetam 50 mg oral tablet: 1 tab(s) orally 2 times a day (9am, 9pm); BUT on days of dialysis, please take an extra dose after dialysis   MDD:150mg  carvedilol 12.5 mg oral tablet: 1 tab(s) orally every 12 hours  doxazosin 4 mg oral tablet: 1 tab(s) orally 2 times a day   epoetin cortney: 64224 unit(s) intravenous 3 times a week  with HD  finasteride 5 mg oral tablet: 1 tab(s) orally once a day  furosemide 80 mg oral tablet: 1 tab(s) orally once a day  insulin glargine 100 units/mL subcutaneous solution: 5 unit(s) subcutaneous once a day (at bedtime)  levothyroxine 50 mcg (0.05 mg) oral tablet: 1 tab(s) orally once a day  Multiple Vitamins oral tablet: 1 tab(s) orally once a day  mycophenolate mofetil 250 mg oral capsule: 500 milligram(s) orally 2 times a day  mycophenolate mofetil 500 mg oral tablet: 1 tab(s) orally 2 times a day   ocular lubricant ophthalmic solution: 2 drop(s) to each affected eye every 6 hours, As Needed  pantoprazole 40 mg oral delayed release tablet: 1 tab(s) orally once a day before breakfast  phenytoin 200 mg oral capsule, extended release: 1 cap(s) orally 2 times a day  polyethylene glycol 3350 oral powder for reconstitution: 17 gram(s) orally once a day, As needed, Constipation  predniSONE 10 mg oral tablet: 1 tab(s) orally once a day  predniSONE 10 mg oral tablet: 1 tab(s) orally once a day   ramelteon 8 mg oral tablet: 1 tab(s) orally once a day (at bedtime)  senna leaf extract oral tablet: 2 tab(s) orally once a day (at bedtime), As Needed  sevelamer carbonate 800 mg oral tablet: 1 tab(s) orally 3 times a day (with meals)  sirolimus 1 mg oral tablet: 7 tab(s) orally once a day   sirolimus 1 mg oral tablet: 7 tab(s) orally once a day   sulfamethoxazole-trimethoprim 400 mg-80 mg oral tablet: 1 tab(s) orally once a day  valGANciclovir 450 mg oral tablet: 1 tab(s) orally 3 times a week (mon/wed/fri)  warfarin 5 mg oral tablet: 1 tab(s) orally once a day (in the evening)

## 2022-09-20 NOTE — PROGRESS NOTE ADULT - SUBJECTIVE AND OBJECTIVE BOX
Patient is a 66y old  Male who presents with a chief complaint of Debility     No events overnight  Reports had a BM yesterday  Tolerating diet    Patient seen and examined at bedside.    ALLERGIES:  No Known Allergies    MEDICATIONS  (STANDING):  amLODIPine   Tablet 10 milliGRAM(s) Oral daily  artificial  tears Solution 2 Drop(s) Both EYES every 6 hours  brivaracetam 50 milliGRAM(s) Oral two times a day  carvedilol 12.5 milliGRAM(s) Oral every 12 hours  chlorhexidine 2% Cloths 1 Application(s) Topical <User Schedule>  dextrose 5%. 1000 milliLiter(s) (100 mL/Hr) IV Continuous <Continuous>  dextrose 5%. 1000 milliLiter(s) (50 mL/Hr) IV Continuous <Continuous>  dextrose 50% Injectable 25 Gram(s) IV Push once  dextrose 50% Injectable 12.5 Gram(s) IV Push once  dextrose 50% Injectable 25 Gram(s) IV Push once  doxazosin 4 milliGRAM(s) Oral <User Schedule>  epoetin cortney-epbx (RETACRIT) Injectable 26470 Unit(s) IV Push <User Schedule>  finasteride 5 milliGRAM(s) Oral daily  furosemide    Tablet 80 milliGRAM(s) Oral daily  glucagon  Injectable 1 milliGRAM(s) IntraMuscular once  insulin glargine Injectable (LANTUS) 5 Unit(s) SubCutaneous at bedtime  insulin lispro (ADMELOG) corrective regimen sliding scale   SubCutaneous three times a day before meals  insulin lispro (ADMELOG) corrective regimen sliding scale   SubCutaneous at bedtime  insulin lispro Injectable (ADMELOG) 3 Unit(s) SubCutaneous three times a day before meals  levothyroxine 50 MICROGram(s) Oral daily  multivitamin 1 Tablet(s) Oral daily  mycophenolate mofetil 500 milliGRAM(s) Oral <User Schedule>  nystatin    Suspension 621598 Unit(s) Oral four times a day  pantoprazole    Tablet 40 milliGRAM(s) Oral <User Schedule>  phenytoin   Capsule 200 milliGRAM(s) Oral two times a day  predniSONE   Tablet 10 milliGRAM(s) Oral daily  ramelteon 8 milliGRAM(s) Oral at bedtime  sevelamer carbonate 800 milliGRAM(s) Oral three times a day with meals  sirolimus 7 milliGRAM(s) Oral <User Schedule>  trimethoprim   80 mG/sulfamethoxazole 400 mG 1 Tablet(s) Oral daily  valGANciclovir 450 milliGRAM(s) Oral <User Schedule>  warfarin 6 milliGRAM(s) Oral once    MEDICATIONS  (PRN):  acetaminophen     Tablet .. 650 milliGRAM(s) Oral every 6 hours PRN Temp greater or equal to 38C (100.4F), Mild Pain (1 - 3)  dextrose Oral Gel 15 Gram(s) Oral once PRN Blood Glucose LESS THAN 70 milliGRAM(s)/deciliter  polyethylene glycol 3350 17 Gram(s) Oral daily PRN Constipation    Vital Signs Last 24 Hrs  T(C): 36.4 (20 Sep 2022 09:11), Max: 36.8 (19 Sep 2022 17:10)  T(F): 97.5 (20 Sep 2022 09:11), Max: 98.2 (19 Sep 2022 17:10)  HR: 60 (20 Sep 2022 09:11) (56 - 75)  BP: 152/64 (20 Sep 2022 09:11) (134/75 - 174/69)  RR: 15 (20 Sep 2022 09:11) (15 - 18)  SpO2: 98% (20 Sep 2022 09:11) (95% - 98%)    Parameters below as of 19 Sep 2022 20:43  Patient On (Oxygen Delivery Method): room air    PHYSICAL EXAM:  General: NAD, A/O x 2, weak appearing  Chest: permacath C/D/I  ENT: MMM, no scleral icterus  Neck: Supple, No JVD, no thyroidomegaly  Lungs: Clear to auscultation bilaterally, no wheezes, no rales, no rhonchi, good inspiratory effort  Cardio: RRR, S1/S2, No murmurs  Abdomen: Soft, Nontender, Nondistended; Bowel sounds present  Extremities: No calf tenderness, No pitting edema, no skin changes    LABS:                        8.0    5.85  )-----------( 308      ( 16 Sep 2022 14:30 )             25.9       09-16    130  |  93  |  93  ----------------------------<  200  4.2   |  25  |  5.65    Ca    8.4      16 Sep 2022 14:30    TPro  6.7  /  Alb  1.9  /  TBili  0.4  /  DBili  x   /  AST  27  /  ALT  41  /  AlkPhos  582  09-16       PT/INR - ( 19 Sep 2022 06:17 )   PT: 31.3 sec;   INR: 2.67 ratio      07-27 Chol -- LDL -- HDL -- Trig 118 mg/dL    POCT Blood Glucose.: 163 mg/dL (19 Sep 2022 07:58)  POCT Blood Glucose.: 121 mg/dL (18 Sep 2022 20:25)  POCT Blood Glucose.: 134 mg/dL (18 Sep 2022 17:15)  POCT Blood Glucose.: 245 mg/dL (18 Sep 2022 12:36)    COVID-19 PCR: NotDetec (09-15-22 @ 05:41)  COVID-19 PCR: NotDetec (09-09-22 @ 07:43)  COVID-19 PCR: NotDetec (09-07-22 @ 18:29)  COVID-19 PCR: NotDetec (09-01-22 @ 07:05)  COVID-19 PCR: NotDetec (08-29-22 @ 11:09)  COVID-19 PCR: NotDetec (09-15-22 @ 05:41)  COVID-19 PCR: NotDetec (09-09-22 @ 07:43)  COVID-19 PCR: NotDetec (09-07-22 @ 18:29)  COVID-19 PCR: NotDetec (09-01-22 @ 07:05)  COVID-19 PCR: NotDetec (08-29-22 @ 11:09)  COVID-19 PCR: NotDetec (08-25-22 @ 18:36)  COVID-19 PCR: NotDetec (08-22-22 @ 06:42)  COVID-19 PCR: NotDetec (08-15-22 @ 17:07)  COVID-19 PCR: NotDetec (08-07-22 @ 18:05)  COVID-19 PCR: NotDetec (07-25-22 @ 11:51)

## 2022-09-20 NOTE — DISCHARGE NOTE PROVIDER - NSDCHHDEMENTIA_GEN_ALL_CORE
Problem: Alteration in Thoughts and Perception  Goal: Verbalize thoughts and feelings  Description  Interventions:  - Promote a nonjudgmental and trusting relationship with the patient through active listening and therapeutic communication  - Assess patient's level of functioning, behavior and potential for risk  - Engage patient in 1 on 1 interactions  - Encourage patient to express fears, feelings, frustrations, and discuss symptoms    - [place] patient to reality, help patient recognize reality-based thinking   - Administer medications as ordered and assess for potential side effects  - Provide the patient education related to the signs and symptoms of the illness and desired effects of prescribed medications  Outcome: Progressing  Goal: Refrain from acting on delusional thinking/internal stimuli  Description  Interventions:  - Monitor patient closely, per order   - Utilize least restrictive measures   - Set reasonable limits, give positive feedback for acceptable   - Administer medications as ordered and monitor of potential side effects  Outcome: Progressing  Goal: Agree to be compliant with medication regime, as prescribed and report medication side effects  Description  Interventions:  - Offer appropriate PRN medication and supervise ingestion; conduct AIMS, as needed   Outcome: Progressing  Goal: Attend and participate in unit activities, including therapeutic, recreational, and educational groups  Description  Interventions:  -Encourage Visitation and family involvement in care  Outcome: Progressing  Goal: Recognize dysfunctional thoughts, communicate reality-based thoughts at the time of discharge  Description  Interventions:  - Provide medication and psycho-education to assist patient in compliance and developing insight into his/her illness   Outcome: Progressing  Goal: Complete daily ADLs, including personal hygiene independently, as able  Description  Interventions:  - Observe, teach, and assist patient with ADLS  - Monitor and promote a balance of rest/activity, with adequate nutrition and elimination   Outcome: Progressing Cognitive dysfunction

## 2022-09-20 NOTE — PROGRESS NOTE ADULT - SUBJECTIVE AND OBJECTIVE BOX
Patient has tested positive for Strongyloides antibody. I recommended Ivermectin.   This was reviewed with the transplant team by the rehab team. They concurred & so pt will be given Ivermectin.   As per my earlier discussion with TAMMY Petersen, I suggested 2 doses, but a single dose should be adequate.   We can stop Ivermectin after a single dose.   We also recommend to test his stools for O & P

## 2022-09-20 NOTE — DISCHARGE NOTE PROVIDER - NPI NUMBER (FOR SYSADMIN USE ONLY) :
[[id number]],[[id number]] [[id number]],[[id number]]

## 2022-09-20 NOTE — DISCHARGE NOTE PROVIDER - NSDCCPCAREPLAN_GEN_ALL_CORE_FT
PRINCIPAL DISCHARGE DIAGNOSIS  Diagnosis: Seizure  Assessment and Plan of Treatment: Hosptialized due to refractory seizures  witness on EEG. Now stable on phenytoin and brivaracetam.  Extra dose of brivaracetam given after dialysis.  Please follow up with Dr. Andre Patterson to discuss management         SECONDARY DISCHARGE DIAGNOSES  Diagnosis: History of renal transplant  Assessment and Plan of Treatment: right renal transplant on 4/21/2022  Will follow transplant medication management with: mycophenolae mofetil, sirolimus, and prednisone  Prophylaxis: trimethoprim/sulfamethoxazole daily, and valganciclovir M/W/F  Back on dialysis - last dialysis 10/4.  Renal approve to resume outpatient dialysis for Friday (10/7).  Then continue with Mon/Wed/Fri schedule.  Continue wtihd Sevelamer Carbonate three times daily, Epoetin with dialysis  Follow up with Dr. Alfred as soon as possible    Diagnosis: Insomnia  Assessment and Plan of Treatment: trialed melatonin with no effect.  Started on ramelteon 8mg  If sleeping well at home, may wean/discontinue    Diagnosis: Chronic atrial fibrillation  Assessment and Plan of Treatment: was previously on Eliquis as blood thinner for afib.  However, given new seizure medication and affect with eliquis, now switched to coumadin.  INR goal 2-3.  Was receently stopped for colonoscopy - now back on dose.  INR 1.98 on 10/5.  Was previously maintained on 5 mg.  Continue with carvedilol for rate control  Please follow up with primary care provider/cardiologist to monitor INR / coumadin dosing    Diagnosis: HTN (hypertension)  Assessment and Plan of Treatment: Blood pressure has been 130-155/63-73 ranage  Continue with: amlodipine, carvedilol, doxazosin, furosemide    Diagnosis: Diabetes  Assessment and Plan of Treatment: Glucose was originally fluctuating to 200s.  Now better managed.  Remain insulin: Lantus 5 U at bedtime and standing Lispro 5U + sliding scale coverage.    Please follow up with Primary care provider/Endocriniologist    Diagnosis: Anemia  Assessment and Plan of Treatment: Anemia of chronic diseases - noted with hemagloben downtrend.  Recieved 1 Unit of PRBC on 10/1 with dialysis.  Fecal occult was positive.  Seen by GI and has colonoscopy which revealed gastritis and small hemorrhoids.  If you note dark tarry stool, please contact primary care provider - if it's beyond scope, PCP will refer to gastro

## 2022-09-20 NOTE — DISCHARGE NOTE PROVIDER - DETAILS OF MALNUTRITION DIAGNOSIS/DIAGNOSES
This patient has been assessed with a concern for Malnutrition and was treated during this hospitalization for the following Nutrition diagnosis/diagnoses:     -  09/09/2022: Severe protein-calorie malnutrition

## 2022-09-21 PROBLEM — E11.22 CONTROLLED TYPE 2 DIABETES MELLITUS WITH CHRONIC KIDNEY DISEASE ON CHRONIC DIALYSIS, WITH LONG-TERM CURRENT USE OF INSULIN: Status: ACTIVE | Noted: 2020-10-08

## 2022-09-21 NOTE — PROGRESS NOTE ADULT - ASSESSMENT
66M with DM2, HTN, CAD with stents/CABG, AF on Coumadin, hx CVA, seizure, PUD, s/p failed renal transplant now back on HD, recent hospitalization for seizures requiring intubation and course complicated by UTI, as well as need for VATS due to large hemothorax, now in rehab     #Seizure disorder: c/w phenytoin and Brivaracetam     #ESRD on HD / failed renal transplant: c/w HD as per renal, c/w immunosuppression, c/w infectious disease prophylaxis    #Anemia of chronic disease: stable, c/w EPO    #Chronic AF on Coumadin: goal INR 2-3, INR 2.61 , c/w Coreg    #DM2 with steroid-induced hyperglycemia  A1C with Estimated Average Glucose Result: 6.4 %  C/w lantus 5units qhs, c/w premeal insulin  C/w hypoglycemia protocol    #Hypothyroid: c/w synthroid    #Right IJ DVT: coumadin 6mg tonight    #Urinary retention: c/w Proscar and Doxazosin     #Abdominal wall cellulitis: Completed course of antibiotics    #DVT ppx: Coumadin

## 2022-09-21 NOTE — PROGRESS NOTE ADULT - ASSESSMENT
PT CAME IN FOR APPT AT 8:20 ON 12/27/2021. TOLD HIM IT WAS CANCELLED ON 12/20/2021 PER NOTE IN SYSTEM. PT SAID HE DID NOT CANCEL AND WANTS TO BE SEEN TODAY FOR DIABETIC FOOT CARE. PER ROVERTO PUT ON SCHEDULE.    66 yr old Male with PMHx of DM type II, HTN, CAD s/p stents/ CABG (2020), AFib on Eliquis, CVA (2019) d/t Afib, Seizure d/o following CVA, last episode was on 4/8/22, h/o GIB (2/2022) EGD with duodenal ulcer.  ESRD due to DM was on HD since 2019, s/p premacath removal 5/10/22 s/p Right DDRT (4/21/22) and placed on belatacept.  Frequent hospitalization d/t weakness, refractory sz, and sepsis.  Admitted and intubated 6/10, found to large pleural effusion s/p thoracentesis ~1.3 and VATS of thoracic 2.2 L.      TRANSPLANT PATIENT -  * INR pending - coumadin ____ dose for today  * transplant team PHONE  --Iker Alfred (MD) ??  Dr. Eduardo Barber (transplant surgeon) - Darleen Dior NP [phone]*  * [place] NP - recommendations for labs: BK virus DNA (9/15 - negative), lactate (367), mag (2.0), parathyroid (202), phosphorus (2.8), CRP (230), uric acid (3.2), and UA (oliguria).  CMV pcr (9/15 - negative)  *Strongyloides antibodies positive - ID and transplant made aware.  Started on Ivermectin x 2 dose  * HD M/W/F - need additional brivaracetam after HD    COMORBIDITES/ACTIVE MEDICAL ISSUES   Gait Instability, ADL impairments and Functional impairments secondary to recurrent UTI, status epilepticus, with debility seizures. 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  Belatacept d/t 8/1 and was started on Sirolimus 7mg QD  - Prednisone 10  - Cellcept 250 BID on hold  - S/p perma cath placement 8/30/2022  - PPx medications: Nystatin, Bactrim, Valcyte (MWF)  - Nephrology following - modified to [facility]  - Strongyloides Antibodies positive - Ivermectin x2 dose    #Anemia of chronic disease  - Has gotten total of 6U PRBC and 2 U FFP during acute hospital   - monitor H/H 8.0 (9/12) - 8.0 (9/16) - repeat CBC 9/19 with HD    #HTN  #Afib  - Eliquis switched to Coumadin  - Coreg   - Daily INR til therapeutic on a consistent coumadin dose    #Diabetes  - A1C  - Lantus 3U -> inc 10 U  - standing Admelog dc, restarted 9/16 @ 3U with meals  - FS + ISS  - Hospitalist following   - FS <180     #Hypothyroidism  - Synthroid 37.5 mcg    #Pain control  - Tylenol PRN    #GI/Bowel Mgmt   - At risk for constipation due to neurologic diagnosis, immobility and/or medication use  - Senna d/c d/t loose BM 9/11  -  Miralax PRN    #Bladder management  - incontinence     #DVT   - acute DVT to R IJ thrombosis, Superficial vein thrombus to right basilic and cephalic vein  - Coumadin  - SCDs, TEDs     #Skin:  -Stage 2 pressure ulcer to sacrum 0.3 x 0.2- cleanse with NS, apply wet gauze andcover with allevyn foam dressing  -At risk for pressure injury due to neurologic diagnosis and relative immobility  -Bilateral heels - soft heel protectors, apply liquid barrier film daily and monitor for tissue type changes  - Turn Q2 hr while in bed, air mattress  - Skin barrier cream as needed  - Nursing to monitor skin Qshift    Diet  - Regular/easy to chew + Thins  [[facility], renal diet]    + Fluid Restriction: 1L    Precautions / PROPHYLAXIS:   - Falls, Cardiac, Seizure   - ortho: Weight bearing status: WBAT   - Lungs: Aspiration, Incentive Spirometer   - Pressure injury/Skin: Turn Q2hrs while in bed, OOB to Chair, PT/OT      liaison with family 9/12--Spoke with son Dr Perez  He gave update on patient's clinical state, investigation results and function prior to acute rehab treatment   I gave update on patients current status, and he was happy with same  Plan to to give interval updates to family as needed    IDT 9/19   lives with spouse and 2 sons in  with 6 JARRETT, 1st FL setup.  PTA needed some assistance with ADLs  Functional status: total for toileting, mod - max A with self care, needs lots of cues, posterolateral lean.  Slight/slow gains.  Mod A amb with WC follow.  Mod A x 1 using RW, min A with sit to stand to chair.  Amb 20' mod A x 1 WC follow.  Goal: min A/needs hands on A, mod A shower transfer  SLP: discharge as per family request   TDD: 9/29 need 24/7 assistance (has 6 JARRETT) Home vs OK    Follow ups:  Andre Patterson)  Clinical Neurophysiology; EEGEpilepsy; Neurology  [address] [facility], [address]  [place] [address]  Phone: [phone]  Fax: [phone]  Follow Up Time:     Padmini Lincoln ()  Internal Medicine  [address], [address]  [place] [address]  Phone: [phone]  Fax: [phone]  Follow Up Time:     Iker Alfred)  Internal Medicine; Nephrology  [address] [place] [id number]  Phone: [phone]  Fax: [phone]  Follow Up Time:    Dr. Eduardo Barber - Organ Transplant    Iker Alfred  [facility]  NEPH[address]  Scheduled Appointment: 09/20/2022    Iker Alfred  [address]  Scheduled Appointment: 10/20/2022

## 2022-09-21 NOTE — PROGRESS NOTE ADULT - SUBJECTIVE AND OBJECTIVE BOX
SUBJECTIVE/ROS:  Patient seen and evaluated sitting up in WC at bedside  State he slept well.  Energy level feels better but still fatigued  Tolerating therapy - have not done stairs yet and has at least 6  JARRETT.  Despite that, he wants to be discharge home  Denies CP, palpitation, SOB, or cough  No HA, dizziness, or lightheadedness  no nausea or abd pain.  LBM  (fecal incontinence)     Vital Signs Last 24 Hrs  T(C): 36.4 (22 @ 20:45), Max: 36.4 (22 @ 20:45)  T(F): 97.5 (22 @ 20:45), Max: 97.5 (22 @ 20:45)  HR: 65 (22 @ 05:09) (60 - 65)  BP: 131/60 (22 @ 05:09) (131/60 - 164/64)  RR: 18 (22 @ 20:45) (18 - 18)  SpO2: 98% (22 @ 20:45) (98% - 98%) on room air    Daily -  Admission weight () 69.8 kg       Daily Weight in k (21 Sep 2022 05:09)    Gen - NAD, comfortable on RA  HEENT - NCAT, EOMI, MMM  Neck - Supple, No limited ROM  Pulm - resp non labored  Cardiovascular - warm and well perfused  Chest - left chest wall permcath  Abdomen -  semi tympanic, NT  Extremities - No Cyanosis, no clubbing, minimal edema to b/l ankles, no calf tenderness  Neuro-     Cognitive - awake, alert, oriented to person and place. Delayed processing/response     Cranial Nerves - CN 2-12 intact. No facial asymmetry, Tongue midline, EOMI, Shoulder shrug intact     Motor -                     LEFT    UE - 3+5                    RIGHT UE - 3-/5                    LEFT    LE - KE 4/5                    RIGHT LE - KE 4/5        Sensory - Intact to LT bilaterally  MSK: generalized weakness  Skin:  stage 2 pressure ulcer to sacrum 0.3 x 0.2,  discoloration to lateral RLQ of abdomen    RECENT LABS:                      8.1    5.28  )-----------( 292      ( 19 Sep 2022 14:15 )             26.3         131<L>  |  91<L>  |  108<H>  ----------------------------<  131<H>  4.1   |  26  |  6.46<H>    Ca    8.6      19 Sep 2022 14:15  Phos  6.2         TPro  6.9  /  Alb  2.0<L>  /  TBili  0.4  /  DBili  x   /  AST  32  /  ALT  41  /  AlkPhos  656<H>      LIVER FUNCTIONS - ( 19 Sep 2022 14:15 )  Alb: 2.0 g/dL / Pro: 6.9 g/dL / ALK PHOS: 656 U/L / ALT: 41 U/L / AST: 32 U/L / GGT: x           PT/INR - ( 20 Sep 2022 06:38 )   PT: 30.6 sec;   INR: 2.61 ratio         Xray Chest 1 View- PORTABLE-Routine (Xray Chest 1 View- PORTABLE-Routine .) (22 @ 12:04) >  Interval exchange of the dialysis catheter. No evidence of pneumothorax.   No other significant change.    Allergies  No Known Allergies    MEDICATIONS  (STANDING):  amLODIPine   Tablet 10 milliGRAM(s) Oral daily  artificial  tears Solution 2 Drop(s) Both EYES every 6 hours  brivaracetam 50 milliGRAM(s) Oral <User Schedule>  brivaracetam 50 milliGRAM(s) Oral <User Schedule>  carvedilol 12.5 milliGRAM(s) Oral every 12 hours  chlorhexidine 2% Cloths 1 Application(s) Topical <User Schedule>  dextrose 5%. 1000 milliLiter(s) (50 mL/Hr) IV Continuous <Continuous>  dextrose 5%. 1000 milliLiter(s) (100 mL/Hr) IV Continuous <Continuous>  dextrose 50% Injectable 25 Gram(s) IV Push once  dextrose 50% Injectable 12.5 Gram(s) IV Push once  dextrose 50% Injectable 25 Gram(s) IV Push once  doxazosin 4 milliGRAM(s) Oral <User Schedule>  epoetin cortney-epbx (RETACRIT) Injectable 32386 Unit(s) IV Push <User Schedule>  finasteride 5 milliGRAM(s) Oral daily  furosemide    Tablet 80 milliGRAM(s) Oral daily  glucagon  Injectable 1 milliGRAM(s) IntraMuscular once  insulin glargine Injectable (LANTUS) 5 Unit(s) SubCutaneous at bedtime  insulin lispro (ADMELOG) corrective regimen sliding scale   SubCutaneous three times a day before meals  insulin lispro (ADMELOG) corrective regimen sliding scale   SubCutaneous at bedtime  insulin lispro Injectable (ADMELOG) 3 Unit(s) SubCutaneous three times a day before meals  ivermectin 12 milliGRAM(s) Oral daily  levothyroxine 50 MICROGram(s) Oral daily  multivitamin 1 Tablet(s) Oral daily  mycophenolate mofetil 500 milliGRAM(s) Oral <User Schedule>  nystatin    Suspension 962903 Unit(s) Oral four times a day  pantoprazole    Tablet 40 milliGRAM(s) Oral <User Schedule>  phenytoin   Capsule 200 milliGRAM(s) Oral two times a day  predniSONE   Tablet 10 milliGRAM(s) Oral daily  ramelteon 8 milliGRAM(s) Oral at bedtime  sevelamer carbonate 800 milliGRAM(s) Oral three times a day with meals  sirolimus 7 milliGRAM(s) Oral <User Schedule>  trimethoprim   80 mG/sulfamethoxazole 400 mG 1 Tablet(s) Oral daily  valGANciclovir 450 milliGRAM(s) Oral <User Schedule>    MEDICATIONS  (PRN):  acetaminophen     Tablet .. 650 milliGRAM(s) Oral every 6 hours PRN Temp greater or equal to 38C (100.4F), Mild Pain (1 - 3)  dextrose Oral Gel 15 Gram(s) Oral once PRN Blood Glucose LESS THAN 70 milliGRAM(s)/deciliter  polyethylene glycol 3350 17 Gram(s) Oral daily PRN Constipation   SUBJECTIVE/ROS:  Patient seen and evaluated sitting up in WC at bedside  State he slept well.  Energy level feels better but still fatigued  Tolerating therapy - have not done stairs yet and has at least 6  JARRETT.  Despite that, he wants to be discharge home  Denies CP, palpitation, SOB, or cough  No HA, dizziness, or lightheadedness  no nausea or abd pain.  LBM  (fecal incontinence)    Therapy--engaging, energy level improved  Still partly dependent for ADLs  with marked executive fxn deficits    Vital Signs Last 24 Hrs  T(C): 36.4 (22 @ 20:45), Max: 36.4 (22 @ 20:45)  T(F): 97.5 (22 @ 20:45), Max: 97.5 (22 @ 20:45)  HR: 65 (22 @ 05:09) (60 - 65)  BP: 131/60 (22 @ 05:09) (131/60 - 164/64)  RR: 18 (22 @ 20:45) (18 - 18)  SpO2: 98% (22 @ 20:45) (98% - 98%) on room air    Daily -  Admission weight () 69.8 kg       Daily Weight in k (21 Sep 2022 05:09)    Gen - NAD, comfortable on RA  HEENT - NCAT, EOMI, MMM  Neck - Supple, No limited ROM  Pulm - resp non labored  Cardiovascular - warm and well perfused  Chest - left chest wall permcath  Abdomen -  semi tympanic, NT  Extremities - No Cyanosis, no clubbing, minimal edema to b/l ankles, no calf tenderness  Neuro-     Cognitive - awake, alert, oriented to person and place. Delayed processing/response     Cranial Nerves - CN 2-12 intact. No facial asymmetry, Tongue midline, EOMI, Shoulder shrug intact     Motor -                     LEFT    UE - 3+5                    RIGHT UE - 3-/5                    LEFT    LE - KE 4/5                    RIGHT LE - KE 4/5        Sensory - Intact to LT bilaterally  MSK: generalized weakness  Skin:  stage 2 pressure ulcer to sacrum 0.3 x 0.2,  discoloration to lateral RLQ of abdomen    RECENT LABS:                      8.1    5.28  )-----------( 292      ( 19 Sep 2022 14:15 )             26.3         131<L>  |  91<L>  |  108<H>  ----------------------------<  131<H>  4.1   |  26  |  6.46<H>    Ca    8.6      19 Sep 2022 14:15  Phos  6.2         TPro  6.9  /  Alb  2.0<L>  /  TBili  0.4  /  DBili  x   /  AST  32  /  ALT  41  /  AlkPhos  656<H>      LIVER FUNCTIONS - ( 19 Sep 2022 14:15 )  Alb: 2.0 g/dL / Pro: 6.9 g/dL / ALK PHOS: 656 U/L / ALT: 41 U/L / AST: 32 U/L / GGT: x           PT/INR - ( 20 Sep 2022 06:38 )   PT: 30.6 sec;   INR: 2.61 ratio         Xray Chest 1 View- PORTABLE-Routine (Xray Chest 1 View- PORTABLE-Routine .) (22 @ 12:04) >  Interval exchange of the dialysis catheter. No evidence of pneumothorax.   No other significant change.    Allergies  No Known Allergies    MEDICATIONS  (STANDING):  amLODIPine   Tablet 10 milliGRAM(s) Oral daily  artificial  tears Solution 2 Drop(s) Both EYES every 6 hours  brivaracetam 50 milliGRAM(s) Oral <User Schedule>  brivaracetam 50 milliGRAM(s) Oral <User Schedule>  carvedilol 12.5 milliGRAM(s) Oral every 12 hours  chlorhexidine 2% Cloths 1 Application(s) Topical <User Schedule>  dextrose 5%. 1000 milliLiter(s) (50 mL/Hr) IV Continuous <Continuous>  dextrose 5%. 1000 milliLiter(s) (100 mL/Hr) IV Continuous <Continuous>  dextrose 50% Injectable 25 Gram(s) IV Push once  dextrose 50% Injectable 12.5 Gram(s) IV Push once  dextrose 50% Injectable 25 Gram(s) IV Push once  doxazosin 4 milliGRAM(s) Oral <User Schedule>  epoetin cortney-epbx (RETACRIT) Injectable 49730 Unit(s) IV Push <User Schedule>  finasteride 5 milliGRAM(s) Oral daily  furosemide    Tablet 80 milliGRAM(s) Oral daily  glucagon  Injectable 1 milliGRAM(s) IntraMuscular once  insulin glargine Injectable (LANTUS) 5 Unit(s) SubCutaneous at bedtime  insulin lispro (ADMELOG) corrective regimen sliding scale   SubCutaneous three times a day before meals  insulin lispro (ADMELOG) corrective regimen sliding scale   SubCutaneous at bedtime  insulin lispro Injectable (ADMELOG) 3 Unit(s) SubCutaneous three times a day before meals  ivermectin 12 milliGRAM(s) Oral daily  levothyroxine 50 MICROGram(s) Oral daily  multivitamin 1 Tablet(s) Oral daily  mycophenolate mofetil 500 milliGRAM(s) Oral <User Schedule>  nystatin    Suspension 284755 Unit(s) Oral four times a day  pantoprazole    Tablet 40 milliGRAM(s) Oral <User Schedule>  phenytoin   Capsule 200 milliGRAM(s) Oral two times a day  predniSONE   Tablet 10 milliGRAM(s) Oral daily  ramelteon 8 milliGRAM(s) Oral at bedtime  sevelamer carbonate 800 milliGRAM(s) Oral three times a day with meals  sirolimus 7 milliGRAM(s) Oral <User Schedule>  trimethoprim   80 mG/sulfamethoxazole 400 mG 1 Tablet(s) Oral daily  valGANciclovir 450 milliGRAM(s) Oral <User Schedule>    MEDICATIONS  (PRN):  acetaminophen     Tablet .. 650 milliGRAM(s) Oral every 6 hours PRN Temp greater or equal to 38C (100.4F), Mild Pain (1 - 3)  dextrose Oral Gel 15 Gram(s) Oral once PRN Blood Glucose LESS THAN 70 milliGRAM(s)/deciliter  polyethylene glycol 3350 17 Gram(s) Oral daily PRN Constipation

## 2022-09-21 NOTE — PROGRESS NOTE ADULT - SUBJECTIVE AND OBJECTIVE BOX
Patient is a 66y old  Male who presents with a chief complaint of Debility (20 Sep 2022 21:05)    Reports feeling well  Denies chest pain, SOB  Patient seen and examined at bedside.    ALLERGIES:  No Known Allergies    MEDICATIONS  (STANDING):  amLODIPine   Tablet 10 milliGRAM(s) Oral daily  artificial  tears Solution 2 Drop(s) Both EYES every 6 hours  brivaracetam 50 milliGRAM(s) Oral <User Schedule>  brivaracetam 50 milliGRAM(s) Oral <User Schedule>  carvedilol 12.5 milliGRAM(s) Oral every 12 hours  chlorhexidine 2% Cloths 1 Application(s) Topical <User Schedule>  dextrose 5%. 1000 milliLiter(s) (50 mL/Hr) IV Continuous <Continuous>  dextrose 5%. 1000 milliLiter(s) (100 mL/Hr) IV Continuous <Continuous>  dextrose 50% Injectable 25 Gram(s) IV Push once  dextrose 50% Injectable 12.5 Gram(s) IV Push once  dextrose 50% Injectable 25 Gram(s) IV Push once  doxazosin 4 milliGRAM(s) Oral <User Schedule>  epoetin cortney-epbx (RETACRIT) Injectable 08236 Unit(s) IV Push <User Schedule>  finasteride 5 milliGRAM(s) Oral daily  furosemide    Tablet 80 milliGRAM(s) Oral daily  glucagon  Injectable 1 milliGRAM(s) IntraMuscular once  insulin glargine Injectable (LANTUS) 5 Unit(s) SubCutaneous at bedtime  insulin lispro (ADMELOG) corrective regimen sliding scale   SubCutaneous three times a day before meals  insulin lispro (ADMELOG) corrective regimen sliding scale   SubCutaneous at bedtime  insulin lispro Injectable (ADMELOG) 3 Unit(s) SubCutaneous three times a day before meals  ivermectin 12 milliGRAM(s) Oral daily  levothyroxine 50 MICROGram(s) Oral daily  multivitamin 1 Tablet(s) Oral daily  mycophenolate mofetil 500 milliGRAM(s) Oral <User Schedule>  nystatin    Suspension 124596 Unit(s) Oral four times a day  pantoprazole    Tablet 40 milliGRAM(s) Oral <User Schedule>  phenytoin   Capsule 200 milliGRAM(s) Oral two times a day  predniSONE   Tablet 10 milliGRAM(s) Oral daily  ramelteon 8 milliGRAM(s) Oral at bedtime  sevelamer carbonate 800 milliGRAM(s) Oral three times a day with meals  sirolimus 7 milliGRAM(s) Oral <User Schedule>  trimethoprim   80 mG/sulfamethoxazole 400 mG 1 Tablet(s) Oral daily  valGANciclovir 450 milliGRAM(s) Oral <User Schedule>    MEDICATIONS  (PRN):  acetaminophen     Tablet .. 650 milliGRAM(s) Oral every 6 hours PRN Temp greater or equal to 38C (100.4F), Mild Pain (1 - 3)  dextrose Oral Gel 15 Gram(s) Oral once PRN Blood Glucose LESS THAN 70 milliGRAM(s)/deciliter  polyethylene glycol 3350 17 Gram(s) Oral daily PRN Constipation    Vital Signs Last 24 Hrs  T(F): 97.5 (20 Sep 2022 20:45), Max: 97.5 (20 Sep 2022 20:45)  HR: 65 (21 Sep 2022 05:09) (60 - 65)  BP: 131/60 (21 Sep 2022 05:09) (131/60 - 164/64)  RR: 18 (20 Sep 2022 20:45) (18 - 18)  SpO2: 98% (20 Sep 2022 20:45) (98% - 98%)  I&O's Summary    PHYSICAL EXAM:  General: NAD, A/O x 2, weak appearing  Chest: permacath C/D/I  ENT: MMM, no scleral icterus  Neck: Supple, No JVD, no thyroidomegaly  Lungs: Clear to auscultation bilaterally, no wheezes, no rales, no rhonchi, good inspiratory effort  Cardio: RRR, S1/S2, No murmurs  Abdomen: Soft, Nontender, Nondistended; Bowel sounds present  Extremities: No calf tenderness, No pitting edema, no skin changes    LABS:                        8.1    5.28  )-----------( 292      ( 19 Sep 2022 14:15 )             26.3       09-19    131  |  91  |  108  ----------------------------<  131  4.1   |  26  |  6.46    Ca    8.6      19 Sep 2022 14:15  Phos  6.2     09-19    TPro  6.9  /  Alb  2.0  /  TBili  0.4  /  DBili  x   /  AST  32  /  ALT  41  /  AlkPhos  656  09-19       PT/INR - ( 20 Sep 2022 06:38 )   PT: 30.6 sec;   INR: 2.61 ratio    07-27 Chol -- LDL -- HDL -- Trig 118 mg/dL    POCT Blood Glucose.: 163 mg/dL (21 Sep 2022 08:11)  POCT Blood Glucose.: 153 mg/dL (20 Sep 2022 20:47)  POCT Blood Glucose.: 116 mg/dL (20 Sep 2022 17:04)  POCT Blood Glucose.: 165 mg/dL (20 Sep 2022 11:53)    COVID-19 PCR: NotDetec (09-15-22 @ 05:41)  COVID-19 PCR: NotDetec (09-09-22 @ 07:43)  COVID-19 PCR: NotDetec (09-07-22 @ 18:29)  COVID-19 PCR: NotDetec (09-01-22 @ 07:05)  COVID-19 PCR: NotDetec (08-29-22 @ 11:09)  COVID-19 PCR: NotDetec (09-15-22 @ 05:41)  COVID-19 PCR: NotDetec (09-09-22 @ 07:43)  COVID-19 PCR: NotDetec (09-07-22 @ 18:29)  COVID-19 PCR: NotDetec (09-01-22 @ 07:05)  COVID-19 PCR: NotDetec (08-29-22 @ 11:09)  COVID-19 PCR: NotDetec (08-25-22 @ 18:36)  COVID-19 PCR: NotDetec (08-22-22 @ 06:42)  COVID-19 PCR: NotDetec (08-15-22 @ 17:07)  COVID-19 PCR: NotDetec (08-07-22 @ 18:05)  COVID-19 PCR: NotDetec (07-25-22 @ 11:51)        RADIOLOGY & ADDITIONAL TESTS:  Care Discussed with Consultants/Other Providers:

## 2022-09-21 NOTE — HISTORY OF PRESENT ILLNESS
[Other Location: e.g. School (Enter Location, City,State)___] : at [unfilled], at the time of the visit. [Medical Office: ([facility])___] : at the medical office located in  [FreeTextEntry1] : This visit is provided by telehealth using real time 2 way audio visual technology. This patient is located at rehab and the provider is located at the [facility] medical office at [address] [place] [address]. Patient and family member participated in this visit.\par Verbal consent for this visit was given by patient/accompanying family member\par Total duration of this visit which included conversation, lab review, medication review and clinical assessment and advice lasted approximately 25-30 minutes.\par \par \par \par Currently:\par \par Patient is in rehab at [place] following prolonged hospitalization for seizures,rejection with renal failure requiring hemodialysis.\par Patient feels better. Recovering from generalized weakness. Able to stand on his own and walk with walker and physical therapy support.\par \par No seizures, has been on M/W/F dialysis at [facility], followed by Dr. Schmitt/Dr. Dacia laird.\par \par \par Reviewed for acute symptoms-currently has none.\par Right sided abdominal discoloration has improved and has no longer complains of any pain.\par \par \par \par Taking precautions to prevent COVID exposure\par I reviewed current home  blood pressure and  and medications from discharge summary.\par \par Blood pressure: 127/71 HR 57/min No fever. \par \par Urine output minimal to 90 ml/day\par \par Son reports he is planned for discharge from rehab on 9/29 and will be at home with home PT from [facility] and hemodialysis at [facility].\par \par On Telehealth visit:\par Patient appears comfortable\par No distress, not dyspneic\par Conscious, alert, oriented X 3\par Speech: Normal\par \par Reviewed last lab data \par \par

## 2022-09-21 NOTE — ASSESSMENT
[FreeTextEntry1] : Clinical Impression:\par Kidney Transplant recipient, poorly functioning allograft\par Immunosuppression- Reviewed  \par DM Controlled.\par HTN controlled\par Seizures- controlled\par Anemia: On IGOR\par Plan:\par Immunosuppression reviewed\par Continue current medications\par Patient son will send me discharge medication list next week.\par \par additional changes none made today\par Continue rehab and Physical therapy n discharge\par F/u hemodialysis care with primary nephrologist, Dr. Martinez.\par Labs and follow up visit to be scheduled as discussed.\par \par \par F/u 4 weeks/prn

## 2022-09-21 NOTE — PROGRESS NOTE ADULT - SUBJECTIVE AND OBJECTIVE BOX
NO distress    Vital Signs Last 24 Hrs  T(C): 36.9 (09-21-22 @ 20:21), Max: 37 (09-21-22 @ 14:25)  T(F): 98.5 (09-21-22 @ 20:21), Max: 98.6 (09-21-22 @ 14:25)  HR: 67 (09-21-22 @ 20:21) (56 - 67)  BP: 162/56 (09-21-22 @ 20:21) (127/71 - 162/56)  RR: 16 (09-21-22 @ 20:21) (16 - 20)  SpO2: 97% (09-21-22 @ 20:21) (97% - 100%)    s1s2  b/l air entry  soft, ND  no edema                                                       8.1    5.31  )-----------( 281      ( 21 Sep 2022 14:45 )             26.6     21 Sep 2022 14:45    128    |  89     |  95     ----------------------------<  244    4.3     |  25     |  6.08     Ca    8.6        21 Sep 2022 14:45    TPro  6.8    /  Alb  1.9    /  TBili  0.4    /  DBili  x      /  AST  38     /  ALT  43     /  AlkPhos  723    21 Sep 2022 14:45    LIVER FUNCTIONS - ( 21 Sep 2022 14:45 )  Alb: 1.9 g/dL / Pro: 6.8 g/dL / ALK PHOS: 723 U/L / ALT: 43 U/L / AST: 38 U/L / GGT: x           PT/INR - ( 21 Sep 2022 10:45 )   PT: 26.9 sec;   INR: 2.30 ratio      acetaminophen     Tablet .. 650 milliGRAM(s) Oral every 6 hours PRN  amLODIPine   Tablet 10 milliGRAM(s) Oral daily  artificial  tears Solution 2 Drop(s) Both EYES every 6 hours  brivaracetam 50 milliGRAM(s) Oral <User Schedule>  brivaracetam 50 milliGRAM(s) Oral <User Schedule>  carvedilol 12.5 milliGRAM(s) Oral every 12 hours  chlorhexidine 2% Cloths 1 Application(s) Topical <User Schedule>  dextrose 5%. 1000 milliLiter(s) IV Continuous <Continuous>  dextrose 5%. 1000 milliLiter(s) IV Continuous <Continuous>  dextrose 50% Injectable 25 Gram(s) IV Push once  dextrose 50% Injectable 12.5 Gram(s) IV Push once  dextrose 50% Injectable 25 Gram(s) IV Push once  dextrose Oral Gel 15 Gram(s) Oral once PRN  doxazosin 4 milliGRAM(s) Oral <User Schedule>  epoetin cortney-epbx (RETACRIT) Injectable 15801 Unit(s) IV Push <User Schedule>  finasteride 5 milliGRAM(s) Oral daily  furosemide    Tablet 80 milliGRAM(s) Oral daily  glucagon  Injectable 1 milliGRAM(s) IntraMuscular once  insulin glargine Injectable (LANTUS) 5 Unit(s) SubCutaneous at bedtime  insulin lispro (ADMELOG) corrective regimen sliding scale   SubCutaneous three times a day before meals  insulin lispro (ADMELOG) corrective regimen sliding scale   SubCutaneous at bedtime  insulin lispro Injectable (ADMELOG) 3 Unit(s) SubCutaneous three times a day before meals  levothyroxine 50 MICROGram(s) Oral daily  multivitamin 1 Tablet(s) Oral daily  mycophenolate mofetil 500 milliGRAM(s) Oral <User Schedule>  nystatin    Suspension 452843 Unit(s) Oral four times a day  pantoprazole    Tablet 40 milliGRAM(s) Oral <User Schedule>  phenytoin   Capsule 200 milliGRAM(s) Oral two times a day  polyethylene glycol 3350 17 Gram(s) Oral daily PRN  predniSONE   Tablet 10 milliGRAM(s) Oral daily  ramelteon 8 milliGRAM(s) Oral at bedtime  sevelamer carbonate 800 milliGRAM(s) Oral three times a day with meals  sirolimus 7 milliGRAM(s) Oral <User Schedule>  trimethoprim   80 mG/sulfamethoxazole 400 mG 1 Tablet(s) Oral daily  valGANciclovir 450 milliGRAM(s) Oral <User Schedule>    A/P:    S/p complicated hospital course as per HPI  Now in [place]  S/p DDRT 4/21/22, required HD, came off HD since 5/5/22, back on HD since 7/27/22  Will continue transplant meds per transplant team recommendations  Avoid nephrotoxins  HD MWF  Epoetin w/HD 3 x week  TMP as able  Renal diet  Bld work w/each HD   Rehab    [phone]

## 2022-09-22 NOTE — PROGRESS NOTE ADULT - ASSESSMENT
66 yr old Male with PMHx of DM type II, HTN, CAD s/p stents/ CABG (2020), AFib on Eliquis, CVA (2019) d/t Afib, Seizure d/o following CVA, last episode was on 4/8/22, h/o GIB (2/2022) EGD with duodenal ulcer.  ESRD due to DM was on HD since 2019, s/p premacath removal 5/10/22 s/p Right DDRT (4/21/22) and placed on belatacept.  Frequent hospitalization d/t weakness, refractory sz, and sepsis.  Admitted and intubated 6/10, found to large pleural effusion s/p thoracentesis ~1.3 and VATS of thoracic 2.2 L.      TRANSPLANT PATIENT -  * INR 2.72 (9/22)- coumadin 6 dose for today  * transplant team PHONE  --Iker Alfred (MD) ??  Dr. Eduardo Barbre (transplant surgeon) - Darleen Dior NP [phone]*  * Barby MUNOZ - recommendations for labs: BK virus DNA (9/15 - negative, pending for 9/22), lactate (367 9/15-> 340 9/22 ), mag (2.0 -> 1.9), parathyroid (202-> 178), phosphorus (6.9 9/19 -> 3.7), CRP (230 -> 190), uric acid (3.2 -> 3.2), and UA (oliguria).  CMV pcr (9/15 - negative, pending for 9/22)  *Strongyloides antibodies positive - ID and transplant made aware.  Ivermectin x 1 dose (9/21)  * HD M/W/F - need additional brivaracetam after HD    COMORBIDITES/ACTIVE MEDICAL ISSUES   Gait Instability, ADL impairments and Functional impairments secondary to recurrent UTI, status epilepticus, with debility seizures. 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  Belatacept d/t 8/1 and was started on Sirolimus 7mg QD  - Prednisone 10  - Cellcept 250 BID on hold  - S/p perma cath placement 8/30/2022  - PPx medications: Nystatin, Bactrim, Valcyte (MWF)  - Nephrology following - modified to [facility]  - Strongyloides Antibodies positive - Ivermectin x1 dose (9/21)    #Anemia of chronic disease  - Has gotten total of 6U PRBC and 2 U FFP during acute hospital   - monitor H/H 8.0 (9/12) - 8.1 (9/21) stable    #HTN  #Afib  - Eliquis switched to Coumadin  - Coreg   - Daily INR til therapeutic on a consistent coumadin dose    #Diabetes  - A1C  - Lantus  - standing Admelog dc, restarted 9/16 @ 3U with meals  - FS + ISS  - Hospitalist following   - -246 (9/22)    #Hypothyroidism  - Synthroid 37.5 mcg    #Pain control  - Tylenol PRN    #GI/Bowel Mgmt   - At risk for constipation due to neurologic diagnosis, immobility and/or medication use  - Senna d/c d/t loose BM 9/11  -  Miralax PRN    #Bladder management  - incontinence     #DVT   - acute DVT to R IJ thrombosis, Superficial vein thrombus to right basilic and cephalic vein  - Coumadin  - SCDs, TEDs     #Skin:  -Stage 2 pressure ulcer to sacrum 0.3 x 0.2- cleanse with NS, apply wet gauze andcover with allevyn foam dressing  -At risk for pressure injury due to neurologic diagnosis and relative immobility  -Bilateral heels - soft heel protectors, apply liquid barrier film daily and monitor for tissue type changes  - Turn Q2 hr while in bed, air mattress  - Skin barrier cream as needed  - Nursing to monitor skin Qshift    Diet  - Regular/easy to chew + Thins  [[facility], renal diet]    + Fluid Restriction: 1L    Precautions / PROPHYLAXIS:   - Falls, Cardiac, Seizure   - ortho: Weight bearing status: WBAT   - Lungs: Aspiration, Incentive Spirometer   - Pressure injury/Skin: Turn Q2hrs while in bed, OOB to Chair, PT/OT      liaison with family 9/12--Spoke with son Dr Perez  He gave update on patient's clinical state, investigation results and function prior to acute rehab treatment   I gave update on patients current status, and he was happy with same  Plan to to give interval updates to family as needed    IDT 9/19   lives with spouse and 2 sons in  with 6 JARRETT, 1st FL setup.  PTA needed some assistance with ADLs  Functional status: total for toileting, mod - max A with self care, needs lots of cues, posterolateral lean.  Slight/slow gains.  Mod A amb with WC follow.  Mod A x 1 using RW, min A with sit to stand to chair.  Amb 20' mod A x 1 WC follow.  Goal: min A/needs hands on A, mod A shower transfer  SLP: discharge as per family request   TDD: 9/29 need 24/7 assistance (has 6 JARRETT) Home vs OK    Follow ups:  Andre Patterson)  Clinical Neurophysiology; EEGEpilepsy; Neurology  [address], [address]  [place] [address]  Phone: [phone]  Fax: [phone]  Follow Up Time:     Padmini Lincoln ()  Internal Medicine  [address], [address]  [place] [address]  Phone: [phone]  Fax: [phone]  Follow Up Time:     Iker Alfred)  Internal Medicine; Nephrology  [address] [place] [address]  Phone: [phone]  Fax: [phone]  Follow Up Time:    Dr. Eduardo Barber - Organ Transplant    Iker Alfred  [facility]  [address]  Scheduled Appointment: 09/20/2022    Iker Alfred  [facility]  NE[address]  Scheduled Appointment: 10/20/2022

## 2022-09-22 NOTE — PATIENT PROFILE ADULT - NSPROHMDIABETMGMTSTRAT_GEN_A_NUR
Brief Operative Note    Patient: Tray Mock 51 year old male    MRN: [id number]    Surgeon(s): Cristiano Reed MD  Phone Number: [phone]                       Surgeon(s) and Role:     * Cristiano Reed MD - Primary    Assistant(s): Lynda Doll    Pre-Op Diagnosis: pvd     Post-Op Diagnosis: same     Procedure: Bilateral above knee amputations    Anesthesia Type: General                                   Complications: None    Description: See dictation    Findings: See dictation    Specimens Removed:   ID Type Source Tests Collected by Time   A : RIGHT ABOVE THE KNEE AMPUTATION Tissue Leg, Right SURGICAL PATHOLOGY Cristiano Reed MD 9/22/2022 1225   B : LEFT ABOVE THE KNEE AMPUTATION Tissue Leg, Left SURGICAL PATHOLOGY Cristiano Reed MD 9/22/2022 1242        Estimated Blood Loss: 50 mL    Assistant Tasks: Opening and closing     Implants: * No implants in log *      I was present for the key portions of the procedure and was immediately available for the non-key portions      
blood glucose testing/insulin therapy

## 2022-09-22 NOTE — PROGRESS NOTE ADULT - SUBJECTIVE AND OBJECTIVE BOX
No distress    Vital Signs Last 24 Hrs  T(C): 36.8 (09-22-22 @ 08:41), Max: 36.8 (09-22-22 @ 08:41)  T(F): 98.2 (09-22-22 @ 08:41), Max: 98.2 (09-22-22 @ 08:41)  HR: 62 (09-22-22 @ 08:41) (62 - 68)  BP: 152/76 (09-22-22 @ 08:41) (152/76 - 164/67)  RR: 16 (09-22-22 @ 08:41) (16 - 16)  SpO2: 97% (09-22-22 @ 08:41) (97% - 97%)    s1s2  b/l air entry  soft, ND  no edema                                                               8.1    5.31  )-----------( 281      ( 21 Sep 2022 14:45 )             26.6     21 Sep 2022 14:45    128    |  89     |  95     ----------------------------<  244    4.3     |  25     |  6.08     Ca    8.6        21 Sep 2022 14:45  Phos  3.7       22 Sep 2022 06:15  Mg     1.9       22 Sep 2022 06:15    TPro  6.8    /  Alb  1.9    /  TBili  0.4    /  DBili  x      /  AST  38     /  ALT  43     /  AlkPhos  723    21 Sep 2022 14:45    LIVER FUNCTIONS - ( 21 Sep 2022 14:45 )  Alb: 1.9 g/dL / Pro: 6.8 g/dL / ALK PHOS: 723 U/L / ALT: 43 U/L / AST: 38 U/L / GGT: x           PT/INR - ( 22 Sep 2022 06:15 )   PT: 31.9 sec;   INR: 2.72 ratio      acetaminophen     Tablet .. 650 milliGRAM(s) Oral every 6 hours PRN  amLODIPine   Tablet 10 milliGRAM(s) Oral daily  artificial  tears Solution 2 Drop(s) Both EYES every 6 hours  brivaracetam 50 milliGRAM(s) Oral <User Schedule>  brivaracetam 50 milliGRAM(s) Oral <User Schedule>  carvedilol 12.5 milliGRAM(s) Oral every 12 hours  chlorhexidine 2% Cloths 1 Application(s) Topical <User Schedule>  dextrose 5%. 1000 milliLiter(s) IV Continuous <Continuous>  dextrose 5%. 1000 milliLiter(s) IV Continuous <Continuous>  dextrose 50% Injectable 25 Gram(s) IV Push once  dextrose 50% Injectable 12.5 Gram(s) IV Push once  dextrose 50% Injectable 25 Gram(s) IV Push once  dextrose Oral Gel 15 Gram(s) Oral once PRN  doxazosin 4 milliGRAM(s) Oral <User Schedule>  epoetin cortney-epbx (RETACRIT) Injectable 81211 Unit(s) IV Push <User Schedule>  finasteride 5 milliGRAM(s) Oral daily  furosemide    Tablet 80 milliGRAM(s) Oral daily  glucagon  Injectable 1 milliGRAM(s) IntraMuscular once  insulin glargine Injectable (LANTUS) 5 Unit(s) SubCutaneous at bedtime  insulin lispro (ADMELOG) corrective regimen sliding scale   SubCutaneous three times a day before meals  insulin lispro (ADMELOG) corrective regimen sliding scale   SubCutaneous at bedtime  insulin lispro Injectable (ADMELOG) 3 Unit(s) SubCutaneous three times a day before meals  levothyroxine 50 MICROGram(s) Oral daily  multivitamin 1 Tablet(s) Oral daily  mycophenolate mofetil 500 milliGRAM(s) Oral <User Schedule>  nystatin    Suspension 565739 Unit(s) Oral four times a day  pantoprazole    Tablet 40 milliGRAM(s) Oral <User Schedule>  phenytoin   Capsule 200 milliGRAM(s) Oral two times a day  polyethylene glycol 3350 17 Gram(s) Oral daily PRN  predniSONE   Tablet 10 milliGRAM(s) Oral daily  ramelteon 8 milliGRAM(s) Oral at bedtime  sevelamer carbonate 800 milliGRAM(s) Oral three times a day with meals  sirolimus 7 milliGRAM(s) Oral <User Schedule>  trimethoprim   80 mG/sulfamethoxazole 400 mG 1 Tablet(s) Oral daily  valGANciclovir 450 milliGRAM(s) Oral <User Schedule>  warfarin 6 milliGRAM(s) Oral once    A/P:    S/p complicated hospital course as per HPI  Now in AR  S/p DDRT 4/21/22, required HD, came off HD since 5/5/22, back on HD since 7/27/22  Will continue transplant meds per transplant team recommendations  Avoid nephrotoxins  HD MWF  Epoetin w/HD 3 x week  TMP as able  Renal diet  Bld work w/each HD   Rehab    [phone]

## 2022-09-22 NOTE — PROGRESS NOTE ADULT - SUBJECTIVE AND OBJECTIVE BOX
SUBJECTIVE/ROS:  Patient seen and evaluated sitting up in WC at bedside - with OT present to SV meals  Good appetite - meals setup with incidental assist.  Minimal cues.   Slept well and feeling good - energy always better day after HD  Denies CP, palpitation, SOB, or cough  No HA, dizziness, or lightheadedness  no nausea or abd pain.  LBM  (fecal incontinence)    Vital Signs Last 24 Hrs  T(C): 36.8 (22 @ 08:41), Max: 37 (22 @ 14:25)  T(F): 98.2 (22 @ 08:41), Max: 98.6 (22 @ 14:25)  HR: 62 (22 @ 08:41) (56 - 68)  BP: 152/76 (22 @ 08:41) (127/71 - 164/67)  RR: 16 (22 @ 08:41) (16 - 20)  SpO2: 97% (22 @ 08:41) (97% - 100%)on room air    Daily -  Admission weight () 69.8 kg       Daily Weight in k.8 (22 Sep 2022 05:35)    Gen - NAD, comfortable on RA  HEENT - NCAT, EOMI, MMM  Neck - Supple, No limited ROM  Pulm - resp non labored  Cardiovascular - warm and well perfused  Chest - left chest wall permcath  Abdomen -  semi tympanic, NT  Extremities - No Cyanosis, no clubbing, minimal edema to b/l ankles, no calf tenderness  Neuro-     Cognitive - awake, alert, oriented to person and place. Delayed processing/response     Cranial Nerves - CN 2-12 intact. No facial asymmetry, Tongue midline, EOMI, Shoulder shrug intact     Motor -                     LEFT    UE - 3+5                    RIGHT UE - 3-/5                    LEFT    LE - KE 4/5                    RIGHT LE - KE 4/5        Sensory - Intact to LT bilaterally  MSK: generalized weakness  Skin:  stage 2 pressure ulcer to sacrum 0.3 x 0.2,  discoloration to lateral RLQ of abdomen    RECENT LABS:           LAB                        8.1    5.31  )-----------( 281      ( 21 Sep 2022 14:45 )             26.6         128<L>  |  89<L>  |  95<H>  ----------------------------<  244<H>  4.3   |  25  |  6.08<H>    Ca    8.6      21 Sep 2022 14:45  Phos  3.7       Mg     1.9         TPro  6.8  /  Alb  1.9<L>  /  TBili  0.4  /  DBili  x   /  AST  38  /  ALT  43  /  AlkPhos  723<H>      LIVER FUNCTIONS - ( 21 Sep 2022 14:45 )  Alb: 1.9 g/dL / Pro: 6.8 g/dL / ALK PHOS: 723 U/L / ALT: 43 U/L / AST: 38 U/L / GGT: x           PT/INR - ( 22 Sep 2022 06:15 )   PT: 31.9 sec;   INR: 2.72 ratio      Xray Chest 1 View- PORTABLE-Routine (Xray Chest 1 View- PORTABLE-Routine .) (22 @ 12:04) >  Interval exchange of the dialysis catheter. No evidence of pneumothorax.   No other significant change.    Allergies  No Known Allergies    MEDICATIONS  (STANDING):  amLODIPine   Tablet 10 milliGRAM(s) Oral daily  artificial  tears Solution 2 Drop(s) Both EYES every 6 hours  brivaracetam 50 milliGRAM(s) Oral <User Schedule>  brivaracetam 50 milliGRAM(s) Oral <User Schedule>  carvedilol 12.5 milliGRAM(s) Oral every 12 hours  chlorhexidine 2% Cloths 1 Application(s) Topical <User Schedule>  dextrose 5%. 1000 milliLiter(s) (100 mL/Hr) IV Continuous <Continuous>  dextrose 5%. 1000 milliLiter(s) (50 mL/Hr) IV Continuous <Continuous>  dextrose 50% Injectable 25 Gram(s) IV Push once  dextrose 50% Injectable 12.5 Gram(s) IV Push once  dextrose 50% Injectable 25 Gram(s) IV Push once  doxazosin 4 milliGRAM(s) Oral <User Schedule>  epoetin cortney-epbx (RETACRIT) Injectable 57924 Unit(s) IV Push <User Schedule>  finasteride 5 milliGRAM(s) Oral daily  furosemide    Tablet 80 milliGRAM(s) Oral daily  glucagon  Injectable 1 milliGRAM(s) IntraMuscular once  insulin glargine Injectable (LANTUS) 5 Unit(s) SubCutaneous at bedtime  insulin lispro (ADMELOG) corrective regimen sliding scale   SubCutaneous three times a day before meals  insulin lispro (ADMELOG) corrective regimen sliding scale   SubCutaneous at bedtime  insulin lispro Injectable (ADMELOG) 3 Unit(s) SubCutaneous three times a day before meals  levothyroxine 50 MICROGram(s) Oral daily  multivitamin 1 Tablet(s) Oral daily  mycophenolate mofetil 500 milliGRAM(s) Oral <User Schedule>  nystatin    Suspension 286051 Unit(s) Oral four times a day  pantoprazole    Tablet 40 milliGRAM(s) Oral <User Schedule>  phenytoin   Capsule 200 milliGRAM(s) Oral two times a day  predniSONE   Tablet 10 milliGRAM(s) Oral daily  ramelteon 8 milliGRAM(s) Oral at bedtime  sevelamer carbonate 800 milliGRAM(s) Oral three times a day with meals  sirolimus 7 milliGRAM(s) Oral <User Schedule>  trimethoprim   80 mG/sulfamethoxazole 400 mG 1 Tablet(s) Oral daily  valGANciclovir 450 milliGRAM(s) Oral <User Schedule>    MEDICATIONS  (PRN):  acetaminophen     Tablet .. 650 milliGRAM(s) Oral every 6 hours PRN Temp greater or equal to 38C (100.4F), Mild Pain (1 - 3)  dextrose Oral Gel 15 Gram(s) Oral once PRN Blood Glucose LESS THAN 70 milliGRAM(s)/deciliter  polyethylene glycol 3350 17 Gram(s) Oral daily PRN Constipation   SUBJECTIVE/ROS:  Patient seen and evaluated sitting up in WC at bedside - with OT present to SV meals  Good appetite - meals setup with incidental assist.  Minimal cues.   Slept well and feeling good - energy always better day after HD  Denies CP, palpitation, SOB, or cough  No HA, dizziness, or lightheadedness  no nausea or abd pain.  LBM  (fecal incontinence)    labs unremarkable, INR therapeutic     Vital Signs Last 24 Hrs  T(C): 36.8 (22 @ 08:41), Max: 37 (22 @ 14:25)  T(F): 98.2 (22 @ 08:41), Max: 98.6 (22 @ 14:25)  HR: 62 (22 @ 08:41) (56 - 68)  BP: 152/76 (22 @ 08:41) (127/71 - 164/67)  RR: 16 (22 @ 08:41) (16 - 20)  SpO2: 97% (22 @ 08:41) (97% - 100%)on room air    Daily -  Admission weight () 69.8 kg       Daily Weight in k.8 (22 Sep 2022 05:35)    Gen - NAD, comfortable on RA  HEENT - NCAT, EOMI, MMM  Neck - Supple, No limited ROM  Pulm - resp non labored  Cardiovascular - warm and well perfused  Chest - left chest wall permcath  Abdomen -  semi tympanic, NT  Extremities - No Cyanosis, no clubbing, minimal edema to b/l ankles, no calf tenderness  Neuro-     Cognitive - awake, alert, oriented to person and place. Delayed processing/response     Cranial Nerves - CN 2-12 intact. No facial asymmetry, Tongue midline, EOMI, Shoulder shrug intact     Motor -                     LEFT    UE - 3+5                    RIGHT UE - 3-/5                    LEFT    LE - KE 4/5                    RIGHT LE - KE 4/5        Sensory - Intact to LT bilaterally  MSK: generalized weakness  Skin:  stage 2 pressure ulcer to sacrum 0.3 x 0.2,  discoloration to lateral RLQ of abdomen    RECENT LABS:           LAB                        8.1    5.31  )-----------( 281      ( 21 Sep 2022 14:45 )             26.6         128<L>  |  89<L>  |  95<H>  ----------------------------<  244<H>  4.3   |  25  |  6.08<H>    Ca    8.6      21 Sep 2022 14:45  Phos  3.7       Mg     1.9         TPro  6.8  /  Alb  1.9<L>  /  TBili  0.4  /  DBili  x   /  AST  38  /  ALT  43  /  AlkPhos  723<H>      LIVER FUNCTIONS - ( 21 Sep 2022 14:45 )  Alb: 1.9 g/dL / Pro: 6.8 g/dL / ALK PHOS: 723 U/L / ALT: 43 U/L / AST: 38 U/L / GGT: x           PT/INR - ( 22 Sep 2022 06:15 )   PT: 31.9 sec;   INR: 2.72 ratio      Xray Chest 1 View- PORTABLE-Routine (Xray Chest 1 View- PORTABLE-Routine .) (22 @ 12:04) >  Interval exchange of the dialysis catheter. No evidence of pneumothorax.   No other significant change.    Allergies  No Known Allergies    MEDICATIONS  (STANDING):  amLODIPine   Tablet 10 milliGRAM(s) Oral daily  artificial  tears Solution 2 Drop(s) Both EYES every 6 hours  brivaracetam 50 milliGRAM(s) Oral <User Schedule>  brivaracetam 50 milliGRAM(s) Oral <User Schedule>  carvedilol 12.5 milliGRAM(s) Oral every 12 hours  chlorhexidine 2% Cloths 1 Application(s) Topical <User Schedule>  dextrose 5%. 1000 milliLiter(s) (100 mL/Hr) IV Continuous <Continuous>  dextrose 5%. 1000 milliLiter(s) (50 mL/Hr) IV Continuous <Continuous>  dextrose 50% Injectable 25 Gram(s) IV Push once  dextrose 50% Injectable 12.5 Gram(s) IV Push once  dextrose 50% Injectable 25 Gram(s) IV Push once  doxazosin 4 milliGRAM(s) Oral <User Schedule>  epoetin cortney-epbx (RETACRIT) Injectable 97071 Unit(s) IV Push <User Schedule>  finasteride 5 milliGRAM(s) Oral daily  furosemide    Tablet 80 milliGRAM(s) Oral daily  glucagon  Injectable 1 milliGRAM(s) IntraMuscular once  insulin glargine Injectable (LANTUS) 5 Unit(s) SubCutaneous at bedtime  insulin lispro (ADMELOG) corrective regimen sliding scale   SubCutaneous three times a day before meals  insulin lispro (ADMELOG) corrective regimen sliding scale   SubCutaneous at bedtime  insulin lispro Injectable (ADMELOG) 3 Unit(s) SubCutaneous three times a day before meals  levothyroxine 50 MICROGram(s) Oral daily  multivitamin 1 Tablet(s) Oral daily  mycophenolate mofetil 500 milliGRAM(s) Oral <User Schedule>  nystatin    Suspension 671251 Unit(s) Oral four times a day  pantoprazole    Tablet 40 milliGRAM(s) Oral <User Schedule>  phenytoin   Capsule 200 milliGRAM(s) Oral two times a day  predniSONE   Tablet 10 milliGRAM(s) Oral daily  ramelteon 8 milliGRAM(s) Oral at bedtime  sevelamer carbonate 800 milliGRAM(s) Oral three times a day with meals  sirolimus 7 milliGRAM(s) Oral <User Schedule>  trimethoprim   80 mG/sulfamethoxazole 400 mG 1 Tablet(s) Oral daily  valGANciclovir 450 milliGRAM(s) Oral <User Schedule>    MEDICATIONS  (PRN):  acetaminophen     Tablet .. 650 milliGRAM(s) Oral every 6 hours PRN Temp greater or equal to 38C (100.4F), Mild Pain (1 - 3)  dextrose Oral Gel 15 Gram(s) Oral once PRN Blood Glucose LESS THAN 70 milliGRAM(s)/deciliter  polyethylene glycol 3350 17 Gram(s) Oral daily PRN Constipation

## 2022-09-22 NOTE — PROGRESS NOTE ADULT - ASSESSMENT
66M with DM2, HTN, CAD with stents/CABG, AF on Coumadin, hx CVA, seizure, PUD, s/p failed renal transplant now back on HD, recent hospitalization for seizures requiring intubation and course complicated by UTI, as well as need for VATS due to large hemothorax, now in rehab     #Seizure disorder: c/w phenytoin and Brivaracetam     #ESRD on HD / failed renal transplant: c/w HD as per renal, c/w immunosuppression, c/w infectious disease prophylaxis    #Anemia of chronic disease: stable, c/w EPO    #Chronic AF on Coumadin: goal INR 2-3, INR 2.72 , c/w Coreg    #DM2 with steroid-induced hyperglycemia  A1C with Estimated Average Glucose Result: 6.4 %  C/w lantus 5units qhs, c/w premeal insulin  C/w hypoglycemia protocol    #Hypothyroid: c/w synthroid    #Right IJ DVT: coumadin tonight    #Urinary retention: c/w Proscar and Doxazosin     #Abdominal wall cellulitis: Completed course of antibiotics    #DVT ppx: Coumadin

## 2022-09-22 NOTE — PROGRESS NOTE ADULT - SUBJECTIVE AND OBJECTIVE BOX
Patient is a 66y old  Male who presents with a chief complaint of Debility (21 Sep 2022 22:27)    No events overnight    Patient seen and examined at bedside.    ALLERGIES:  No Known Allergies    MEDICATIONS  (STANDING):  amLODIPine   Tablet 10 milliGRAM(s) Oral daily  artificial  tears Solution 2 Drop(s) Both EYES every 6 hours  brivaracetam 50 milliGRAM(s) Oral <User Schedule>  brivaracetam 50 milliGRAM(s) Oral <User Schedule>  carvedilol 12.5 milliGRAM(s) Oral every 12 hours  chlorhexidine 2% Cloths 1 Application(s) Topical <User Schedule>  dextrose 5%. 1000 milliLiter(s) (100 mL/Hr) IV Continuous <Continuous>  dextrose 5%. 1000 milliLiter(s) (50 mL/Hr) IV Continuous <Continuous>  dextrose 50% Injectable 25 Gram(s) IV Push once  dextrose 50% Injectable 12.5 Gram(s) IV Push once  dextrose 50% Injectable 25 Gram(s) IV Push once  doxazosin 4 milliGRAM(s) Oral <User Schedule>  epoetin cortney-epbx (RETACRIT) Injectable 98217 Unit(s) IV Push <User Schedule>  finasteride 5 milliGRAM(s) Oral daily  furosemide    Tablet 80 milliGRAM(s) Oral daily  glucagon  Injectable 1 milliGRAM(s) IntraMuscular once  insulin glargine Injectable (LANTUS) 5 Unit(s) SubCutaneous at bedtime  insulin lispro (ADMELOG) corrective regimen sliding scale   SubCutaneous three times a day before meals  insulin lispro (ADMELOG) corrective regimen sliding scale   SubCutaneous at bedtime  insulin lispro Injectable (ADMELOG) 3 Unit(s) SubCutaneous three times a day before meals  levothyroxine 50 MICROGram(s) Oral daily  multivitamin 1 Tablet(s) Oral daily  mycophenolate mofetil 500 milliGRAM(s) Oral <User Schedule>  nystatin    Suspension 816548 Unit(s) Oral four times a day  pantoprazole    Tablet 40 milliGRAM(s) Oral <User Schedule>  phenytoin   Capsule 200 milliGRAM(s) Oral two times a day  predniSONE   Tablet 10 milliGRAM(s) Oral daily  ramelteon 8 milliGRAM(s) Oral at bedtime  sevelamer carbonate 800 milliGRAM(s) Oral three times a day with meals  sirolimus 7 milliGRAM(s) Oral <User Schedule>  trimethoprim   80 mG/sulfamethoxazole 400 mG 1 Tablet(s) Oral daily  valGANciclovir 450 milliGRAM(s) Oral <User Schedule>    MEDICATIONS  (PRN):  acetaminophen     Tablet .. 650 milliGRAM(s) Oral every 6 hours PRN Temp greater or equal to 38C (100.4F), Mild Pain (1 - 3)  dextrose Oral Gel 15 Gram(s) Oral once PRN Blood Glucose LESS THAN 70 milliGRAM(s)/deciliter  polyethylene glycol 3350 17 Gram(s) Oral daily PRN Constipation    Vital Signs Last 24 Hrs  T(F): 98.2 (22 Sep 2022 08:41), Max: 98.6 (21 Sep 2022 14:25)  HR: 62 (22 Sep 2022 08:41) (56 - 68)  BP: 152/76 (22 Sep 2022 08:41) (127/71 - 164/67)  RR: 16 (22 Sep 2022 08:41) (16 - 20)  SpO2: 97% (22 Sep 2022 08:41) (97% - 100%)  I&O's Summary    21 Sep 2022 07:01  -  22 Sep 2022 07:00  --------------------------------------------------------  IN: 0 mL / OUT: 2500 mL / NET: -2500 mL      PHYSICAL EXAM:  General: NAD, A/O x 2, weak appearing  Chest: permacath C/D/I  ENT: MMM, no scleral icterus  Neck: Supple, No JVD, no thyroidomegaly  Lungs: Clear to auscultation bilaterally, no wheezes, no rales, no rhonchi, good inspiratory effort  Cardio: RRR, S1/S2, No murmurs  Abdomen: Soft, Nontender, Nondistended; Bowel sounds present  Extremities: No calf tenderness, No pitting edema, no skin changes      LABS:                        8.1    5.31  )-----------( 281      ( 21 Sep 2022 14:45 )             26.6       09-21    128  |  89  |  95  ----------------------------<  244  4.3   |  25  |  6.08    Ca    8.6      21 Sep 2022 14:45  Phos  3.7     09-22  Mg     1.9     09-22    TPro  6.8  /  Alb  1.9  /  TBili  0.4  /  DBili  x   /  AST  38  /  ALT  43  /  AlkPhos  723  09-21       PT/INR - ( 22 Sep 2022 06:15 )   PT: 31.9 sec;   INR: 2.72 ratio    PTT - ( 21 Sep 2022 10:45 )  PTT:41.2 sec     07-27 Chol -- LDL -- HDL -- Trig 118 mg/dL    POCT Blood Glucose.: 221 mg/dL (22 Sep 2022 08:25)  POCT Blood Glucose.: 246 mg/dL (21 Sep 2022 21:17)  POCT Blood Glucose.: 195 mg/dL (21 Sep 2022 17:49)  POCT Blood Glucose.: 241 mg/dL (21 Sep 2022 11:24)    COVID-19 PCR: NotDetec (09-21-22 @ 05:45)  COVID-19 PCR: NotDetec (09-15-22 @ 05:41)  COVID-19 PCR: NotDetec (09-09-22 @ 07:43)  COVID-19 PCR: NotDetec (09-07-22 @ 18:29)  COVID-19 PCR: NotDetec (09-01-22 @ 07:05)  COVID-19 PCR: NotDetec (09-21-22 @ 05:45)  COVID-19 PCR: NotDetec (09-15-22 @ 05:41)  COVID-19 PCR: NotDetec (09-09-22 @ 07:43)  COVID-19 PCR: NotDetec (09-07-22 @ 18:29)  COVID-19 PCR: NotDetec (09-01-22 @ 07:05)  COVID-19 PCR: NotDetec (08-29-22 @ 11:09)  COVID-19 PCR: NotDetec (08-25-22 @ 18:36)  COVID-19 PCR: NotDetec (08-22-22 @ 06:42)  COVID-19 PCR: NotDetec (08-15-22 @ 17:07)  COVID-19 PCR: NotDetec (08-07-22 @ 18:05)

## 2022-09-23 NOTE — PROGRESS NOTE ADULT - SUBJECTIVE AND OBJECTIVE BOX
SUBJECTIVE/ROS:  Patient seen and evaluated sitting up in WC during OT   wide awake, feeling well  Tolerating therapy.  Will still need SV + min A at home.  Family has all means to take him home - putting in ramp next week, wife will be at home with patient, sons work but flexible.   will plan for 2 family training next week prior to discharge ()  Denies CP, palpitation, SOB, or cough  No HA, dizziness, or lightheadedness  no nausea or abd pain.  LBM  (fecal incontinence)    Vital Signs Last 24 Hrs  T(C): 36.4 (22 @ 08:32), Max: 36.4 (22 @ 20:37)  T(F): 97.6 (22 @ 08:32), Max: 97.6 (22 @ 20:37)  HR: 64 (22 @ 08:32) (64 - 66)  BP: 137/75 (22 @ 08:32) (137/75 - 156/62)  RR: 15 (22 @ 08:32) (15 - 16)  SpO2: 96% (22 @ 08:32) (96% - 100%) on room air    Daily -  Admission weight () 69.8 kg       Daily Weight in k.2 (23 Sep 2022 06:13)    Gen - NAD, comfortable on RA  HEENT - NCAT, EOMI, MMM  Neck - Supple, No limited ROM  Pulm - resp non labored  Cardiovascular - warm and well perfused  Chest - left chest wall permcath  Abdomen -  semi tympanic, NT  Extremities - No Cyanosis, no clubbing, minimal edema to b/l ankles, no calf tenderness  Neuro-     Cognitive - awake, alert, oriented to person and place. Delayed processing/response     Cranial Nerves - CN 2-12 intact. No facial asymmetry, Tongue midline, EOMI, Shoulder shrug intact     Motor -                     LEFT    UE - 3+5                    RIGHT UE - 3-/5                    LEFT    LE - KE 4/5                    RIGHT LE - KE 4/5        Sensory - Intact to LT bilaterally  MSK: generalized weakness  Skin:  stage 2 pressure ulcer to sacrum 0.3 x 0.2,  discoloration to lateral RLQ of abdomen    RECENT LABS:           LAB - Lab with HD today                        8.1    5.31  )-----------( 281      ( 21 Sep 2022 14:45 )             26.6         128<L>  |  89<L>  |  95<H>  ----------------------------<  244<H>  4.3   |  25  |  6.08<H>    Ca    8.6      21 Sep 2022 14:45  Phos  3.7       Mg     1.9         TPro  6.8  /  Alb  1.9<L>  /  TBili  0.4  /  DBili  x   /  AST  38  /  ALT  43  /  AlkPhos  723<H>      LIVER FUNCTIONS - ( 21 Sep 2022 14:45 )  Alb: 1.9 g/dL / Pro: 6.8 g/dL / ALK PHOS: 723 U/L / ALT: 43 U/L / AST: 38 U/L / GGT: x           PT/INR - ( 23 Sep 2022 06:00 )   PT: 31.5 sec;   INR: 2.69 ratio        Xray Chest 1 View- PORTABLE-Routine (Xray Chest 1 View- PORTABLE-Routine .) (22 @ 12:04) >  Interval exchange of the dialysis catheter. No evidence of pneumothorax.   No other significant change.    Allergies  No Known Allergies    MEDICATIONS  (STANDING):  amLODIPine   Tablet 10 milliGRAM(s) Oral daily  artificial  tears Solution 2 Drop(s) Both EYES every 6 hours  brivaracetam 50 milliGRAM(s) Oral <User Schedule>  brivaracetam 50 milliGRAM(s) Oral <User Schedule>  carvedilol 12.5 milliGRAM(s) Oral every 12 hours  chlorhexidine 2% Cloths 1 Application(s) Topical <User Schedule>  dextrose 5%. 1000 milliLiter(s) (50 mL/Hr) IV Continuous <Continuous>  dextrose 5%. 1000 milliLiter(s) (100 mL/Hr) IV Continuous <Continuous>  dextrose 50% Injectable 25 Gram(s) IV Push once  dextrose 50% Injectable 12.5 Gram(s) IV Push once  dextrose 50% Injectable 25 Gram(s) IV Push once  doxazosin 4 milliGRAM(s) Oral <User Schedule>  epoetin cortney-epbx (RETACRIT) Injectable 92158 Unit(s) IV Push <User Schedule>  finasteride 5 milliGRAM(s) Oral daily  furosemide    Tablet 80 milliGRAM(s) Oral daily  glucagon  Injectable 1 milliGRAM(s) IntraMuscular once  insulin glargine Injectable (LANTUS) 5 Unit(s) SubCutaneous at bedtime  insulin lispro (ADMELOG) corrective regimen sliding scale   SubCutaneous three times a day before meals  insulin lispro (ADMELOG) corrective regimen sliding scale   SubCutaneous at bedtime  insulin lispro Injectable (ADMELOG) 5 Unit(s) SubCutaneous three times a day before meals  levothyroxine 50 MICROGram(s) Oral daily  multivitamin 1 Tablet(s) Oral daily  mycophenolate mofetil 500 milliGRAM(s) Oral <User Schedule>  nystatin    Suspension 279965 Unit(s) Oral four times a day  pantoprazole    Tablet 40 milliGRAM(s) Oral <User Schedule>  phenytoin   Capsule 200 milliGRAM(s) Oral two times a day  predniSONE   Tablet 10 milliGRAM(s) Oral daily  ramelteon 8 milliGRAM(s) Oral at bedtime  sevelamer carbonate 800 milliGRAM(s) Oral three times a day with meals  sirolimus 7 milliGRAM(s) Oral <User Schedule>  trimethoprim   80 mG/sulfamethoxazole 400 mG 1 Tablet(s) Oral daily  valGANciclovir 450 milliGRAM(s) Oral <User Schedule>  warfarin 5 milliGRAM(s) Oral once    MEDICATIONS  (PRN):  acetaminophen     Tablet .. 650 milliGRAM(s) Oral every 6 hours PRN Temp greater or equal to 38C (100.4F), Mild Pain (1 - 3)  dextrose Oral Gel 15 Gram(s) Oral once PRN Blood Glucose LESS THAN 70 milliGRAM(s)/deciliter  polyethylene glycol 3350 17 Gram(s) Oral daily PRN Constipation   SUBJECTIVE/ROS:  Patient seen and evaluated sitting up in WC during OT   wide awake, feeling well  Tolerating therapy.  Will still need SV + min A at home.  Family has all means to take him home - putting in ramp next week, wife will be at home with patient, sons work but flexible.   will plan for 2 family training next week prior to discharge ()  Denies CP, palpitation, SOB, or cough  No HA, dizziness, or lightheadedness  no nausea or abd pain.  LBM  (fecal incontinence)    Vital Signs Last 24 Hrs  T(C): 36.4 (22 @ 08:32), Max: 36.4 (22 @ 20:37)  T(F): 97.6 (22 @ 08:32), Max: 97.6 (22 @ 20:37)  HR: 64 (22 @ 08:32) (64 - 66)  BP: 137/75 (22 @ 08:32) (137/75 - 156/62)  RR: 15 (22 @ 08:32) (15 - 16)  SpO2: 96% (22 @ 08:32) (96% - 100%) on room air    Daily -  Admission weight () 69.8 kg       Daily Weight in k.2 (23 Sep 2022 06:13)    Therapy--engaging,   but still has significant executive fxn deficits and gait impairment     Gen - NAD, comfortable on RA  HEENT - NCAT, EOMI, MMM  Neck - Supple, No limited ROM  Pulm - resp non labored  Cardiovascular - warm and well perfused  Chest - left chest wall permcath  Abdomen -  semi tympanic, NT  Extremities - No Cyanosis, no clubbing, minimal edema to b/l ankles, no calf tenderness  Neuro-     Cognitive - awake, alert, oriented to person and place. Delayed processing/response     Cranial Nerves - CN 2-12 intact. No facial asymmetry, Tongue midline, EOMI, Shoulder shrug intact     Motor -                     LEFT    UE - 3+5                    RIGHT UE - 3-/5                    LEFT    LE - KE 4/5                    RIGHT LE - KE 4/5        Sensory - Intact to LT bilaterally  MSK: generalized weakness  Skin:  stage 2 pressure ulcer to sacrum 0.3 x 0.2,  discoloration to lateral RLQ of abdomen    RECENT LABS:           LAB - Lab with HD today                        8.1    5.31  )-----------( 281      ( 21 Sep 2022 14:45 )             26.6         128<L>  |  89<L>  |  95<H>  ----------------------------<  244<H>  4.3   |  25  |  6.08<H>    Ca    8.6      21 Sep 2022 14:45  Phos  3.7       Mg     1.9         TPro  6.8  /  Alb  1.9<L>  /  TBili  0.4  /  DBili  x   /  AST  38  /  ALT  43  /  AlkPhos  723<H>      LIVER FUNCTIONS - ( 21 Sep 2022 14:45 )  Alb: 1.9 g/dL / Pro: 6.8 g/dL / ALK PHOS: 723 U/L / ALT: 43 U/L / AST: 38 U/L / GGT: x           PT/INR - ( 23 Sep 2022 06:00 )   PT: 31.5 sec;   INR: 2.69 ratio        Xray Chest 1 View- PORTABLE-Routine (Xray Chest 1 View- PORTABLE-Routine .) (22 @ 12:04) >  Interval exchange of the dialysis catheter. No evidence of pneumothorax.   No other significant change.    Allergies  No Known Allergies    MEDICATIONS  (STANDING):  amLODIPine   Tablet 10 milliGRAM(s) Oral daily  artificial  tears Solution 2 Drop(s) Both EYES every 6 hours  brivaracetam 50 milliGRAM(s) Oral <User Schedule>  brivaracetam 50 milliGRAM(s) Oral <User Schedule>  carvedilol 12.5 milliGRAM(s) Oral every 12 hours  chlorhexidine 2% Cloths 1 Application(s) Topical <User Schedule>  dextrose 5%. 1000 milliLiter(s) (50 mL/Hr) IV Continuous <Continuous>  dextrose 5%. 1000 milliLiter(s) (100 mL/Hr) IV Continuous <Continuous>  dextrose 50% Injectable 25 Gram(s) IV Push once  dextrose 50% Injectable 12.5 Gram(s) IV Push once  dextrose 50% Injectable 25 Gram(s) IV Push once  doxazosin 4 milliGRAM(s) Oral <User Schedule>  epoetin cortney-epbx (RETACRIT) Injectable 78308 Unit(s) IV Push <User Schedule>  finasteride 5 milliGRAM(s) Oral daily  furosemide    Tablet 80 milliGRAM(s) Oral daily  glucagon  Injectable 1 milliGRAM(s) IntraMuscular once  insulin glargine Injectable (LANTUS) 5 Unit(s) SubCutaneous at bedtime  insulin lispro (ADMELOG) corrective regimen sliding scale   SubCutaneous three times a day before meals  insulin lispro (ADMELOG) corrective regimen sliding scale   SubCutaneous at bedtime  insulin lispro Injectable (ADMELOG) 5 Unit(s) SubCutaneous three times a day before meals  levothyroxine 50 MICROGram(s) Oral daily  multivitamin 1 Tablet(s) Oral daily  mycophenolate mofetil 500 milliGRAM(s) Oral <User Schedule>  nystatin    Suspension 008034 Unit(s) Oral four times a day  pantoprazole    Tablet 40 milliGRAM(s) Oral <User Schedule>  phenytoin   Capsule 200 milliGRAM(s) Oral two times a day  predniSONE   Tablet 10 milliGRAM(s) Oral daily  ramelteon 8 milliGRAM(s) Oral at bedtime  sevelamer carbonate 800 milliGRAM(s) Oral three times a day with meals  sirolimus 7 milliGRAM(s) Oral <User Schedule>  trimethoprim   80 mG/sulfamethoxazole 400 mG 1 Tablet(s) Oral daily  valGANciclovir 450 milliGRAM(s) Oral <User Schedule>  warfarin 5 milliGRAM(s) Oral once    MEDICATIONS  (PRN):  acetaminophen     Tablet .. 650 milliGRAM(s) Oral every 6 hours PRN Temp greater or equal to 38C (100.4F), Mild Pain (1 - 3)  dextrose Oral Gel 15 Gram(s) Oral once PRN Blood Glucose LESS THAN 70 milliGRAM(s)/deciliter  polyethylene glycol 3350 17 Gram(s) Oral daily PRN Constipation

## 2022-09-23 NOTE — PROGRESS NOTE ADULT - ASSESSMENT
66 yr old Male with PMHx of DM type II, HTN, CAD s/p stents/ CABG (2020), AFib on Eliquis, CVA (2019) d/t Afib, Seizure d/o following CVA, last episode was on 4/8/22, h/o GIB (2/2022) EGD with duodenal ulcer.  ESRD due to DM was on HD since 2019, s/p premacath removal 5/10/22 s/p Right DDRT (4/21/22) and placed on belatacept.  Frequent hospitalization d/t weakness, refractory sz, and sepsis.  Admitted and intubated 6/10, found to large pleural effusion s/p thoracentesis ~1.3 and VATS of thoracic 2.2 L.      TRANSPLANT PATIENT -  * INR 2.69 (9/23)- coumadin 5 dose for today  * transplant team PHONE  --Iker Alfred (MD) ??  Dr. Eduardo Barber (transplant surgeon) - Darleen Dior NP [phone]*  * Barby MUNOZ - recommendations for labs: BK virus DNA (9/15 - negative, pending for 9/22), lactate (367 9/15-> 340 9/22 ), mag (2.0 -> 1.9), parathyroid (202-> 178), phosphorus (6.9 9/19 -> 3.7), CRP (230 -> 190), uric acid (3.2 -> 3.2), and UA (oliguria).  CMV pcr (9/20 - negative, pending for 9/22)  *Strongyloides antibodies positive - ID and transplant made aware.  Ivermectin x 1 dose (9/21)  * HD M/W/F - need additional brivaracetam after HD    COMORBIDITES/ACTIVE MEDICAL ISSUES   Gait Instability, ADL impairments and Functional impairments secondary to recurrent UTI, status epilepticus, with debility seizures. 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  Belatacept d/t 8/1 and was started on Sirolimus 7mg QD  - Prednisone 10  - Cellcept 250 BID on hold  - S/p perma cath placement 8/30/2022  - PPx medications: Nystatin, Bactrim, Valcyte (MWF)  - Nephrology following - modified to [facility]  - Strongyloides Antibodies positive - Ivermectin x1 dose (9/21)    #Anemia of chronic disease  - Has gotten total of 6U PRBC and 2 U FFP during acute hospital   - monitor H/H 8.0 (9/12) - 8.1 (9/21) stable    #HTN  #Afib  - Eliquis switched to Coumadin  - Coreg   - Daily INR til therapeutic on a consistent coumadin dose    #Diabetes  - A1C  - Lantus  - standing Admelog dc, restarted 9/16 @ 3U with meals  - FS + ISS  - Hospitalist following   - -253 (9/23) - fluctuates    #Hypothyroidism  - Synthroid 37.5 mcg    #Pain control  - Tylenol PRN    #GI/Bowel Mgmt   - At risk for constipation due to neurologic diagnosis, immobility and/or medication use  - Senna d/c d/t loose BM 9/11  -  Miralax PRN    #Bladder management  - incontinence     #DVT   - acute DVT to R IJ thrombosis, Superficial vein thrombus to right basilic and cephalic vein  - Coumadin  - SCDs, TEDs     #Skin:  -Stage 2 pressure ulcer to sacrum 0.3 x 0.2- cleanse with NS, apply wet gauze andcover with allevyn foam dressing  -At risk for pressure injury due to neurologic diagnosis and relative immobility  -Bilateral heels - soft heel protectors, apply liquid barrier film daily and monitor for tissue type changes  - Turn Q2 hr while in bed, air mattress  - Skin barrier cream as needed  - Nursing to monitor skin Qshift    Diet  - Regular/easy to chew + Thins  [[facility], renal diet]    + Fluid Restriction: 1L    Precautions / PROPHYLAXIS:   - Falls, Cardiac, Seizure   - ortho: Weight bearing status: WBAT   - Lungs: Aspiration, Incentive Spirometer   - Pressure injury/Skin: Turn Q2hrs while in bed, OOB to Chair, PT/OT      liaison with family 9/12--Spoke with son Dr Perez  He gave update on patient's clinical state, investigation results and function prior to acute rehab treatment   I gave update on patients current status, and he was happy with same  Plan to to give interval updates to family as needed    IDT 9/19   lives with spouse and 2 sons in  with 6 JARRETT, [facility] setup.  PTA needed some assistance with ADLs  Functional status: total for toileting, mod - max A with self care, needs lots of cues, posterolateral lean.  Slight/slow gains.  Mod A amb with WC follow.  Mod A x 1 using RW, min A with sit to stand to chair.  Amb 20' mod A x 1 WC follow.  Goal: min A/needs hands on A, mod A shower transfer  SLP: discharge as per family request   TDD: 9/29 need 24/7 assistance (has 6 JARRETT) Home vs OK    Follow ups:  Andre Patterson)  Clinical Neurophysiology; EEGEpilepsy; Neurology  [address], [address]  [place] [address]  Phone: [phone]  Fax: [phone]  Follow Up Time:     Padmini Lincoln ()  Internal Medicine  [address], [address]  [place] [address]  Phone: [phone]  Fax: [phone]  Follow Up Time:     Iker Alfred)  Internal Medicine; Nephrology  [address] [place] [address]  Phone: [phone]  Fax: [phone]  Follow Up Time:    Dr. Eduardo Barber - Organ Transplant    Iker Alfred  [facility]  [address]  Scheduled Appointment: 09/20/2022    Iker Alfred  [facility]  [address]  Scheduled Appointment: 10/20/2022     66 yr old Male with PMHx of DM type II, HTN, CAD s/p stents/ CABG (2020), AFib on Eliquis, CVA (2019) d/t Afib, Seizure d/o following CVA, last episode was on 4/8/22, h/o GIB (2/2022) EGD with duodenal ulcer.  ESRD due to DM was on HD since 2019, s/p premacath removal 5/10/22 s/p Right DDRT (4/21/22) and placed on belatacept.  Frequent hospitalization d/t weakness, refractory sz, and sepsis.  Admitted and intubated 6/10, found to large pleural effusion s/p thoracentesis ~1.3 and VATS of thoracic 2.2 L.      TRANSPLANT PATIENT -  * INR 2.69 (9/23)- coumadin 5 dose for today  * transplant team PHONE  --Iker Alfred (MD) ??  Dr. Eduardo Barber (transplant surgeon) - Darleen Dior NP [phone]*  * Barby MUNOZ - recommendations for labs: BK virus DNA (9/15 - negative, pending for 9/22), lactate (367 9/15-> 340 9/22 ), mag (2.0 -> 1.9), parathyroid (202-> 178), phosphorus (6.9 9/19 -> 3.7), CRP (230 -> 190), uric acid (3.2 -> 3.2), and UA (oliguria).  CMV pcr (9/20 - negative, pending for 9/22)  *Strongyloides antibodies positive - ID and transplant made aware.  Ivermectin x 1 dose (9/21)  * HD M/W/F - need additional brivaracetam after HD    COMORBIDITES/ACTIVE MEDICAL ISSUES   Gait Instability, ADL impairments and Functional impairments secondary to recurrent UTI, status epilepticus, with debility seizures. 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  Belatacept d/t 8/1 and was started on Sirolimus 7mg QD  - Prednisone 10  - Cellcept 250 BID on hold  - S/p perma cath placement 8/30/2022  - PPx medications: Nystatin, Bactrim, Valcyte (MWF)  - Nephrology following - modified to [facility]  - Strongyloides Antibodies positive - Ivermectin x1 dose (9/21)    #Anemia of chronic disease  - Has gotten total of 6U PRBC and 2 U FFP during acute hospital   - monitor H/H 8.0 (9/12) - 8.1 (9/21) stable    #HTN  #Afib  - Eliquis switched to Coumadin  - Coreg   - Daily INR til therapeutic on a consistent coumadin dose    #Diabetes  - A1C  - Lantus  - standing Admelog dc, restarted 9/16 @ 3U with meals  - FS + ISS  - Hospitalist following   - -253 (9/23) - fluctuates    #Hypothyroidism  - Synthroid 37.5 mcg    #Pain control  - Tylenol PRN    #GI/Bowel Mgmt   - At risk for constipation due to neurologic diagnosis, immobility and/or medication use  - Senna d/c d/t loose BM 9/11  -  Miralax PRN    #Bladder management  - incontinence     #DVT   - acute DVT to R IJ thrombosis, Superficial vein thrombus to right basilic and cephalic vein  - Coumadin  - SCDs, TEDs     #Skin:  -Stage 2 pressure ulcer to sacrum 0.3 x 0.2- cleanse with NS, apply wet gauze andcover with allevyn foam dressing  -At risk for pressure injury due to neurologic diagnosis and relative immobility  -Bilateral heels - soft heel protectors, apply liquid barrier film daily and monitor for tissue type changes  - Turn Q2 hr while in bed, air mattress  - Skin barrier cream as needed  - Nursing to monitor skin Qshift    Diet  - Regular/easy to chew + Thins  [[facility], renal diet]    + Fluid Restriction: 1L    Precautions / PROPHYLAXIS:   - Falls, Cardiac, Seizure   - ortho: Weight bearing status: WBAT   - Lungs: Aspiration, Incentive Spirometer   - Pressure injury/Skin: Turn Q2hrs while in bed, OOB to Chair, PT/OT      liaison with family 9/12--Spoke with son  Chris  He gave update on patient's clinical state, investigation results and function prior to acute rehab treatment   I gave update on patients current status, and he was happy with same  Plan to to give interval updates to family as needed    9/22/22--I discussed with his second son (an RN) at patient's bed side  He reports family's plan for home dc, arrangements made to care for patient--his wife is always at home, and sons make time available    IDT 9/19   lives with spouse and 2 sons in  with 6 JARRETT, [facility] setup.  PTA needed some assistance with ADLs  Functional status: total for toileting, mod - max A with self care, needs lots of cues, posterolateral lean.  Slight/slow gains.  Mod A amb with WC follow.  Mod A x 1 using RW, min A with sit to stand to chair.  Amb 20' mod A x 1 WC follow.  Goal: min A/needs hands on A, mod A shower transfer  SLP: discharge as per family request   TDD: 9/29 need 24/7 assistance (has 6 JARRETT) Home vs OK    Follow ups:  Andre Patterson)  Clinical Neurophysiology; EEGEpilepsy; Neurology  [address], [address]  [place] [address]  Phone: [phone]  Fax: [phone]  Follow Up Time:     Padmini Lincoln ()  Internal Medicine  [address], [address]  [place] [address]  Phone: [phone]  Fax: [phone]  Follow Up Time:     Iker Alfred)  Internal Medicine; Nephrology  [address] [place] [address]  Phone: [phone]  Fax: [phone]  Follow Up Time:    Dr. Eduardo Barber - Organ Transplant    Iker Alfred  [facility]  [address]  Scheduled Appointment: 09/20/2022    Iker Alfred  [facility] [address]  Scheduled Appointment: 10/20/2022

## 2022-09-23 NOTE — PROGRESS NOTE ADULT - SUBJECTIVE AND OBJECTIVE BOX
Patient is a 66y old  Male who presents with a chief complaint of Debility    No events overnight    Patient seen and examined at bedside.    ALLERGIES:  No Known Allergies    MEDICATIONS  (STANDING):  amLODIPine   Tablet 10 milliGRAM(s) Oral daily  artificial  tears Solution 2 Drop(s) Both EYES every 6 hours  brivaracetam 50 milliGRAM(s) Oral <User Schedule>  brivaracetam 50 milliGRAM(s) Oral <User Schedule>  carvedilol 12.5 milliGRAM(s) Oral every 12 hours  chlorhexidine 2% Cloths 1 Application(s) Topical <User Schedule>  dextrose 5%. 1000 milliLiter(s) (100 mL/Hr) IV Continuous <Continuous>  dextrose 5%. 1000 milliLiter(s) (50 mL/Hr) IV Continuous <Continuous>  dextrose 50% Injectable 25 Gram(s) IV Push once  dextrose 50% Injectable 12.5 Gram(s) IV Push once  dextrose 50% Injectable 25 Gram(s) IV Push once  doxazosin 4 milliGRAM(s) Oral <User Schedule>  epoetin cortney-epbx (RETACRIT) Injectable 16241 Unit(s) IV Push <User Schedule>  finasteride 5 milliGRAM(s) Oral daily  furosemide    Tablet 80 milliGRAM(s) Oral daily  glucagon  Injectable 1 milliGRAM(s) IntraMuscular once  insulin glargine Injectable (LANTUS) 5 Unit(s) SubCutaneous at bedtime  insulin lispro (ADMELOG) corrective regimen sliding scale   SubCutaneous three times a day before meals  insulin lispro (ADMELOG) corrective regimen sliding scale   SubCutaneous at bedtime  insulin lispro Injectable (ADMELOG) 3 Unit(s) SubCutaneous three times a day before meals  levothyroxine 50 MICROGram(s) Oral daily  multivitamin 1 Tablet(s) Oral daily  mycophenolate mofetil 500 milliGRAM(s) Oral <User Schedule>  nystatin    Suspension 724226 Unit(s) Oral four times a day  pantoprazole    Tablet 40 milliGRAM(s) Oral <User Schedule>  phenytoin   Capsule 200 milliGRAM(s) Oral two times a day  predniSONE   Tablet 10 milliGRAM(s) Oral daily  ramelteon 8 milliGRAM(s) Oral at bedtime  sevelamer carbonate 800 milliGRAM(s) Oral three times a day with meals  sirolimus 7 milliGRAM(s) Oral <User Schedule>  trimethoprim   80 mG/sulfamethoxazole 400 mG 1 Tablet(s) Oral daily  valGANciclovir 450 milliGRAM(s) Oral <User Schedule>    MEDICATIONS  (PRN):  acetaminophen     Tablet .. 650 milliGRAM(s) Oral every 6 hours PRN Temp greater or equal to 38C (100.4F), Mild Pain (1 - 3)  dextrose Oral Gel 15 Gram(s) Oral once PRN Blood Glucose LESS THAN 70 milliGRAM(s)/deciliter  polyethylene glycol 3350 17 Gram(s) Oral daily PRN Constipation    Vital Signs Last 24 Hrs  T(C): 36.4 (22 Sep 2022 20:37), Max: 36.8 (22 Sep 2022 08:41)  T(F): 97.6 (22 Sep 2022 20:37), Max: 98.2 (22 Sep 2022 08:41)  HR: 66 (23 Sep 2022 05:01) (62 - 66)  BP: 156/62 (23 Sep 2022 05:01) (141/59 - 156/62)  RR: 16 (22 Sep 2022 20:37) (16 - 16)  SpO2: 100% (22 Sep 2022 20:37) (97% - 100%)    Parameters below as of 22 Sep 2022 08:41  Patient On (Oxygen Delivery Method): room air    HYSICAL EXAM:  General: NAD, A/O x 2, weak appearing  Chest: permacath C/D/I  ENT: MMM, no scleral icterus  Neck: Supple, No JVD, no thyroidomegaly  Lungs: Clear to auscultation bilaterally, no wheezes, no rales, no rhonchi, good inspiratory effort  Cardio: RRR, S1/S2, No murmurs  Abdomen: Soft, Nontender, Nondistended; Bowel sounds present  Extremities: No calf tenderness, No pitting edema, no skin changes      LABS:                        8.1    5.31  )-----------( 281      ( 21 Sep 2022 14:45 )             26.6       09-21    128  |  89  |  95  ----------------------------<  244  4.3   |  25  |  6.08    Ca    8.6      21 Sep 2022 14:45  Phos  3.7     09-22  Mg     1.9     09-22    TPro  6.8  /  Alb  1.9  /  TBili  0.4  /  DBili  x   /  AST  38  /  ALT  43  /  AlkPhos  723  09-21     PT/INR - ( 23 Sep 2022 06:00 )   PT: 31.5 sec;   INR: 2.69 ratio       07-27 Chol -- LDL -- HDL -- Trig 118 mg/dL    POCT Blood Glucose.: 221 mg/dL (22 Sep 2022 08:25)  POCT Blood Glucose.: 246 mg/dL (21 Sep 2022 21:17)  POCT Blood Glucose.: 195 mg/dL (21 Sep 2022 17:49)  POCT Blood Glucose.: 241 mg/dL (21 Sep 2022 11:24)    COVID-19 PCR: NotDetec (09-21-22 @ 05:45)  COVID-19 PCR: NotDetec (09-15-22 @ 05:41)  COVID-19 PCR: NotDetec (09-09-22 @ 07:43)  COVID-19 PCR: NotDetec (09-07-22 @ 18:29)  COVID-19 PCR: NotDetec (09-01-22 @ 07:05)  COVID-19 PCR: NotDetec (09-21-22 @ 05:45)  COVID-19 PCR: NotDetec (09-15-22 @ 05:41)  COVID-19 PCR: NotDetec (09-09-22 @ 07:43)  COVID-19 PCR: NotDetec (09-07-22 @ 18:29)  COVID-19 PCR: NotDetec (09-01-22 @ 07:05)  COVID-19 PCR: NotDetec (08-29-22 @ 11:09)  COVID-19 PCR: NotDetec (08-25-22 @ 18:36)  COVID-19 PCR: NotDetec (08-22-22 @ 06:42)  COVID-19 PCR: NotDetec (08-15-22 @ 17:07)  COVID-19 PCR: NotDetec (08-07-22 @ 18:05)

## 2022-09-23 NOTE — PROGRESS NOTE ADULT - SUBJECTIVE AND OBJECTIVE BOX
No distress    Vital Signs Last 24 Hrs  T(C): 36.4 (09-23-22 @ 08:32), Max: 36.4 (09-22-22 @ 20:37)  T(F): 97.6 (09-23-22 @ 08:32), Max: 97.6 (09-22-22 @ 20:37)  HR: 64 (09-23-22 @ 08:32) (64 - 66)  BP: 137/75 (09-23-22 @ 08:32) (137/75 - 156/62)  RR: 15 (09-23-22 @ 08:32) (15 - 16)  SpO2: 96% (09-23-22 @ 08:32) (96% - 100%)    s1s2  b/l air entry  soft, ND  no edema                                                               8.4    4.17  )-----------( 262      ( 23 Sep 2022 14:25 )             27.3     21 Sep 2022 14:45    128    |  89     |  95     ----------------------------<  244    4.3     |  25     |  6.08     Ca    8.6        21 Sep 2022 14:45  Phos  3.7       22 Sep 2022 06:15  Mg     1.9       22 Sep 2022 06:15    TPro  6.8    /  Alb  1.9    /  TBili  0.4    /  DBili  x      /  AST  38     /  ALT  43     /  AlkPhos  723    21 Sep 2022 14:45    LIVER FUNCTIONS - ( 21 Sep 2022 14:45 )  Alb: 1.9 g/dL / Pro: 6.8 g/dL / ALK PHOS: 723 U/L / ALT: 43 U/L / AST: 38 U/L / GGT: x           PT/INR - ( 23 Sep 2022 06:00 )   PT: 31.5 sec;   INR: 2.69 ratio      acetaminophen     Tablet .. 650 milliGRAM(s) Oral every 6 hours PRN  amLODIPine   Tablet 10 milliGRAM(s) Oral daily  artificial  tears Solution 2 Drop(s) Both EYES every 6 hours  brivaracetam 50 milliGRAM(s) Oral <User Schedule>  brivaracetam 50 milliGRAM(s) Oral <User Schedule>  carvedilol 12.5 milliGRAM(s) Oral every 12 hours  chlorhexidine 2% Cloths 1 Application(s) Topical <User Schedule>  dextrose 5%. 1000 milliLiter(s) IV Continuous <Continuous>  dextrose 5%. 1000 milliLiter(s) IV Continuous <Continuous>  dextrose 50% Injectable 25 Gram(s) IV Push once  dextrose 50% Injectable 12.5 Gram(s) IV Push once  dextrose 50% Injectable 25 Gram(s) IV Push once  dextrose Oral Gel 15 Gram(s) Oral once PRN  doxazosin 4 milliGRAM(s) Oral <User Schedule>  epoetin cortney-epbx (RETACRIT) Injectable 93973 Unit(s) IV Push <User Schedule>  finasteride 5 milliGRAM(s) Oral daily  furosemide    Tablet 80 milliGRAM(s) Oral daily  glucagon  Injectable 1 milliGRAM(s) IntraMuscular once  insulin glargine Injectable (LANTUS) 5 Unit(s) SubCutaneous at bedtime  insulin lispro (ADMELOG) corrective regimen sliding scale   SubCutaneous three times a day before meals  insulin lispro (ADMELOG) corrective regimen sliding scale   SubCutaneous at bedtime  insulin lispro Injectable (ADMELOG) 5 Unit(s) SubCutaneous three times a day before meals  levothyroxine 50 MICROGram(s) Oral daily  multivitamin 1 Tablet(s) Oral daily  mycophenolate mofetil 500 milliGRAM(s) Oral <User Schedule>  nystatin    Suspension 257723 Unit(s) Oral four times a day  pantoprazole    Tablet 40 milliGRAM(s) Oral <User Schedule>  phenytoin   Capsule 200 milliGRAM(s) Oral two times a day  polyethylene glycol 3350 17 Gram(s) Oral daily PRN  predniSONE   Tablet 10 milliGRAM(s) Oral daily  ramelteon 8 milliGRAM(s) Oral at bedtime  sevelamer carbonate 800 milliGRAM(s) Oral three times a day with meals  sirolimus 7 milliGRAM(s) Oral <User Schedule>  trimethoprim   80 mG/sulfamethoxazole 400 mG 1 Tablet(s) Oral daily  valGANciclovir 450 milliGRAM(s) Oral <User Schedule>  warfarin 5 milliGRAM(s) Oral once    A/P:    S/p complicated hospital course as per HPI  Now in [facility]  S/p DDRT 4/21/22, required HD, came off HD since 5/5/22, back on HD since 7/27/22  Continue transplant meds per transplant team recommendations  Avoid nephrotoxins  HD MWF as ordered  Epoetin w/HD 3 x week  TMP as able  Renal diet  Bld work w/each HD   Rehab    [phone]

## 2022-09-23 NOTE — PROGRESS NOTE ADULT - ASSESSMENT
66M with DM2, HTN, CAD with stents/CABG, AF on Coumadin, hx CVA, seizure, PUD, s/p failed renal transplant now back on HD, recent hospitalization for seizures requiring intubation and course complicated by UTI, as well as need for VATS due to large hemothorax, now in rehab     #Seizure disorder: c/w phenytoin and Brivaracetam     #ESRD on HD / failed renal transplant: c/w HD as per renal, c/w immunosuppression, c/w infectious disease prophylaxis    #Anemia of chronic disease: stable, c/w EPO    #Chronic AF on Coumadin: goal INR 2-3, INR 2.69 , c/w Coreg    #DM2 with steroid-induced hyperglycemia  A1C with Estimated Average Glucose Result: 6.4 %  Noted BS from yesterday running on higher side  Will increase premeal admelog to 5units  C/w lantus 5units qhs  C/w hypoglycemia protocol    #Hypothyroid: c/w synthroid    #Right IJ DVT: coumadin tonight    #Urinary retention: c/w Proscar and Doxazosin     #Abdominal wall cellulitis: Completed course of antibiotics    #DVT ppx: Coumadin

## 2022-09-24 NOTE — PROGRESS NOTE ADULT - ASSESSMENT
66 yr old Male with PMHx of DM type II, HTN, CAD s/p stents/ CABG (2020), AFib on Eliquis, CVA (2019) d/t Afib, Seizure d/o following CVA, last episode was on 4/8/22, h/o GIB (2/2022) EGD with duodenal ulcer.  ESRD due to DM was on HD since 2019, s/p premacath removal 5/10/22 s/p Right DDRT (4/21/22) and placed on belatacept.  Frequent hospitalization d/t weakness, refractory sz, and sepsis.  Admitted and intubated 6/10, found to large pleural effusion s/p thoracentesis ~1.3 and VATS of thoracic 2.2 L.      TRANSPLANT PATIENT -  * INR 2.49 (9/24)- coumadin 5 dose for today  * transplant team PHONE  --Iker Alfred (MD) ??  Dr. Eduardo Barber (transplant surgeon) - Darleen Dior NP [phone]*  * HD M/W/F -     COMORBIDITES/ACTIVE MEDICAL ISSUES   Gait Instability, ADL impairments and Functional impairments secondary to recurrent UTI, status epilepticus, with debility seizures. 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  Belatacept d/t 8/1 and was started on Sirolimus 7mg QD  - Prednisone 10  - Cellcept 250 BID on hold  - S/p perma cath placement 8/30/2022  - PPx medications: Nystatin, Bactrim, Valcyte (MWF)  - Nephrology following - modified to [facility]  - Strongyloides Antibodies positive - Ivermectin x1 dose (9/21)    #Anemia of chronic disease  - Has gotten total of 6U PRBC and 2 U FFP during acute hospital   - monitor H/H 8.0 (9/12) - 8.1 (9/21) stable    #HTN  #Afib  - Eliquis switched to Coumadin  - Coreg   - Daily INR til therapeutic on a consistent coumadin dose    #Diabetes  - A1C  - Lantus  - standing Admelog dc, restarted 9/16 @ 3U with meals  - FS + ISS  - Hospitalist following   - -253 (9/23) - fluctuates    #Hypothyroidism  - Synthroid 37.5 mcg    #Pain control  - Tylenol PRN    #GI/Bowel Mgmt   - At risk for constipation due to neurologic diagnosis, immobility and/or medication use  - Senna d/c d/t loose BM 9/11  -  Miralax PRN    #Bladder management  - incontinence     #DVT   - acute DVT to R IJ thrombosis, Superficial vein thrombus to right basilic and cephalic vein  - Coumadin  - SCDs, TEDs     #Skin:  -Stage 2 pressure ulcer to sacrum 0.3 x 0.2- cleanse with NS, apply wet gauze andcover with allevyn foam dressing  -At risk for pressure injury due to neurologic diagnosis and relative immobility  -Bilateral heels - soft heel protectors, apply liquid barrier film daily and monitor for tissue type changes  - Turn Q2 hr while in bed, air mattress  - Skin barrier cream as needed  - Nursing to monitor skin Qshift  Patient is scheduled for debridement of the sacrum on Monday    Diet  - Regular/easy to chew + Thins  [[facility], renal diet]    + Fluid Restriction: 1L    Precautions / PROPHYLAXIS:   - Falls, Cardiac, Seizure   - ortho: Weight bearing status: WBAT   - Lungs: Aspiration, Incentive Spirometer   - Pressure injury/Skin: Turn Q2hrs while in bed, OOB to Chair, PT/OT       66 yr old Male with PMHx of DM type II, HTN, CAD s/p stents/ CABG (2020), AFib on Eliquis, CVA (2019) d/t Afib, Seizure d/o following CVA, last episode was on 4/8/22, h/o GIB (2/2022) EGD with duodenal ulcer.  ESRD due to DM was on HD since 2019, s/p premacath removal 5/10/22 s/p Right DDRT (4/21/22) and placed on belatacept.  Frequent hospitalization d/t weakness, refractory sz, and sepsis.  Admitted and intubated 6/10, found to large pleural effusion s/p thoracentesis ~1.3 and VATS of thoracic 2.2 L.      TRANSPLANT PATIENT -  * INR 2.49 (9/24)- coumadin 5 dose for today  * transplant team PHONE  --Iker Alfred (MD) ??  Dr. Eduardo Barber (transplant surgeon) - Darleen Dior NP [phone]*  * HD M/W/F -     COMORBIDITES/ACTIVE MEDICAL ISSUES   Gait Instability, ADL impairments and Functional impairments secondary to recurrent UTI, status epilepticus, with debility seizures. 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  Belatacept d/t 8/1 and was started on Sirolimus 7mg QD  - Prednisone 10  - Cellcept 250 BID on hold  - S/p perma cath placement 8/30/2022  - PPx medications: Nystatin, Bactrim, Valcyte (MWF)  - Nephrology following - modified to [facility]  - Strongyloides Antibodies positive - Ivermectin x1 dose (9/21)    #Anemia of chronic disease  - Has gotten total of 6U PRBC and 2 U FFP during acute hospital   - monitor H/H 8.0 (9/12) - 8.4 (9/23) stable    #HTN  #Afib  - Eliquis switched to Coumadin  - Coreg   - Daily INR til therapeutic on a consistent coumadin dose    #Diabetes  - A1C  - Lantus  - standing Admelog dc, restarted 9/16 @ 3U with meals  - FS + ISS  - Hospitalist following   - -236 (9/24) - fluctuates    #Hypothyroidism  - Synthroid 37.5 mcg    #Pain control  - Tylenol PRN    #GI/Bowel Mgmt   - At risk for constipation due to neurologic diagnosis, immobility and/or medication use  - Senna d/c d/t loose BM 9/11  -  Miralax PRN    #Bladder management  - incontinence     #DVT   - acute DVT to R IJ thrombosis, Superficial vein thrombus to right basilic and cephalic vein  - Coumadin  - SCDs, TEDs     #Skin:  -Stage 2 pressure ulcer to sacrum 0.3 x 0.2- cleanse with NS, apply wet gauze andcover with allevyn foam dressing  -At risk for pressure injury due to neurologic diagnosis and relative immobility  -Bilateral heels - soft heel protectors, apply liquid barrier film daily and monitor for tissue type changes  - Turn Q2 hr while in bed, air mattress  - Skin barrier cream as needed  - Nursing to monitor skin Qshift      Diet  - Regular/easy to chew + Thins  [[facility], renal diet]    + Fluid Restriction: 1L    Precautions / PROPHYLAXIS:   - Falls, Cardiac, Seizure   - ortho: Weight bearing status: WBAT   - Lungs: Aspiration, Incentive Spirometer   - Pressure injury/Skin: Turn Q2hrs while in bed, OOB to Chair, PT/OT

## 2022-09-24 NOTE — PROGRESS NOTE ADULT - SUBJECTIVE AND OBJECTIVE BOX
SUBJECTIVE/ROS:  Patient seen and evaluated sitting up in WC during PT   wide awake, feeling well  Tolerating therapy.    Patient offers no complaint of pain  Slept well last PM    Vital Signs Last 24 Hrs  T(C): 37.1 (24 Sep 2022 09:58), Max: 37.1 (23 Sep 2022 14:15)  T(F): 98.8 (24 Sep 2022 09:58), Max: 98.8 (23 Sep 2022 14:15)  HR: 63 (24 Sep 2022 09:58) (58 - 72)  BP: 134/61 (24 Sep 2022 09:58) (133/61 - 161/64)  RR: 16 (24 Sep 2022 09:58) (15 - 17)  SpO2: 98% (24 Sep 2022 09:58) (96% - 100%)    Gen - NAD, comfortable on RA  HEENT - NCAT, EOMI, MMM  Neck - Supple, No limited ROM  Pulm - resp non labored  Cardiovascular - warm and well perfused  Chest - left chest wall permcath  Abdomen -  semi tympanic, NT  Extremities - No Cyanosis, no clubbing, minimal edema to b/l ankles, no calf tenderness  Neuro-     Cognitive - awake, alert, oriented to person and place. Delayed processing/response     Cranial Nerves - CN 2-12 intact. No facial asymmetry, Tongue midline, EOMI, Shoulder shrug intact     Motor -                     LEFT    UE - 3+5                    RIGHT UE - 3-/5                    LEFT    LE - KE 4/5                    RIGHT LE - KE 4/5        Sensory - Intact to LT bilaterally  MSK: generalized weakness  Skin:  stage 2 pressure ulcer to sacrum 0.3 x 0.2,  discoloration to lateral RLQ of abdomen    PT/INR - ( 24 Sep 2022 06:10 )   PT: 29.2 sec;   INR: 2.49 ratio       PT/INR - ( 23 Sep 2022 06:00 )   PT: 31.5 sec;   INR: 2.69 ratio      Allergies  No Known Allergies    MEDICATIONS  (STANDING):  amLODIPine   Tablet 10 milliGRAM(s) Oral daily  artificial  tears Solution 2 Drop(s) Both EYES every 6 hours  brivaracetam 50 milliGRAM(s) Oral <User Schedule>  brivaracetam 50 milliGRAM(s) Oral <User Schedule>  carvedilol 12.5 milliGRAM(s) Oral every 12 hours  chlorhexidine 2% Cloths 1 Application(s) Topical <User Schedule>  dextrose 5%. 1000 milliLiter(s) (50 mL/Hr) IV Continuous <Continuous>  dextrose 5%. 1000 milliLiter(s) (100 mL/Hr) IV Continuous <Continuous>  dextrose 50% Injectable 25 Gram(s) IV Push once  dextrose 50% Injectable 12.5 Gram(s) IV Push once  dextrose 50% Injectable 25 Gram(s) IV Push once  doxazosin 4 milliGRAM(s) Oral <User Schedule>  epoetin cortney-epbx (RETACRIT) Injectable 23173 Unit(s) IV Push <User Schedule>  finasteride 5 milliGRAM(s) Oral daily  furosemide    Tablet 80 milliGRAM(s) Oral daily  glucagon  Injectable 1 milliGRAM(s) IntraMuscular once  insulin glargine Injectable (LANTUS) 5 Unit(s) SubCutaneous at bedtime  insulin lispro (ADMELOG) corrective regimen sliding scale   SubCutaneous three times a day before meals  insulin lispro (ADMELOG) corrective regimen sliding scale   SubCutaneous at bedtime  insulin lispro Injectable (ADMELOG) 5 Unit(s) SubCutaneous three times a day before meals  levothyroxine 50 MICROGram(s) Oral daily  multivitamin 1 Tablet(s) Oral daily  mycophenolate mofetil 500 milliGRAM(s) Oral <User Schedule>  nystatin    Suspension 965423 Unit(s) Oral four times a day  pantoprazole    Tablet 40 milliGRAM(s) Oral <User Schedule>  phenytoin   Capsule 200 milliGRAM(s) Oral two times a day  predniSONE   Tablet 10 milliGRAM(s) Oral daily  ramelteon 8 milliGRAM(s) Oral at bedtime  sevelamer carbonate 800 milliGRAM(s) Oral three times a day with meals  sirolimus 7 milliGRAM(s) Oral <User Schedule>  trimethoprim   80 mG/sulfamethoxazole 400 mG 1 Tablet(s) Oral daily  valGANciclovir 450 milliGRAM(s) Oral <User Schedule>  warfarin 5 milliGRAM(s) Oral once    MEDICATIONS  (PRN):  acetaminophen     Tablet .. 650 milliGRAM(s) Oral every 6 hours PRN Temp greater or equal to 38C (100.4F), Mild Pain (1 - 3)  dextrose Oral Gel 15 Gram(s) Oral once PRN Blood Glucose LESS THAN 70 milliGRAM(s)/deciliter  polyethylene glycol 3350 17 Gram(s) Oral daily PRN Constipation

## 2022-09-24 NOTE — PROGRESS NOTE ADULT - SUBJECTIVE AND OBJECTIVE BOX
Hospitalist: Alex Steel DO    CHIEF COMPLAINT: Patient is a 66y old  male who presents with a chief complaint of Debility (23 Sep 2022 14:43)      SUBJECTIVE / OVERNIGHT EVENTS: Patient seen and examined. No acute events overnight. Pain well controlled and patient without any complaints.    MEDICATIONS  (STANDING):  amLODIPine   Tablet 10 milliGRAM(s) Oral daily  artificial  tears Solution 2 Drop(s) Both EYES every 6 hours  brivaracetam 50 milliGRAM(s) Oral <User Schedule>  brivaracetam 50 milliGRAM(s) Oral <User Schedule>  carvedilol 12.5 milliGRAM(s) Oral every 12 hours  chlorhexidine 2% Cloths 1 Application(s) Topical <User Schedule>  dextrose 5%. 1000 milliLiter(s) (100 mL/Hr) IV Continuous <Continuous>  dextrose 5%. 1000 milliLiter(s) (50 mL/Hr) IV Continuous <Continuous>  dextrose 50% Injectable 25 Gram(s) IV Push once  dextrose 50% Injectable 12.5 Gram(s) IV Push once  dextrose 50% Injectable 25 Gram(s) IV Push once  doxazosin 4 milliGRAM(s) Oral <User Schedule>  epoetin cortney-epbx (RETACRIT) Injectable 52793 Unit(s) IV Push <User Schedule>  finasteride 5 milliGRAM(s) Oral daily  furosemide    Tablet 80 milliGRAM(s) Oral daily  glucagon  Injectable 1 milliGRAM(s) IntraMuscular once  insulin glargine Injectable (LANTUS) 5 Unit(s) SubCutaneous at bedtime  insulin lispro (ADMELOG) corrective regimen sliding scale   SubCutaneous three times a day before meals  insulin lispro (ADMELOG) corrective regimen sliding scale   SubCutaneous at bedtime  insulin lispro Injectable (ADMELOG) 5 Unit(s) SubCutaneous three times a day before meals  levothyroxine 50 MICROGram(s) Oral daily  multivitamin 1 Tablet(s) Oral daily  mycophenolate mofetil 500 milliGRAM(s) Oral <User Schedule>  nystatin    Suspension 085727 Unit(s) Oral four times a day  pantoprazole    Tablet 40 milliGRAM(s) Oral <User Schedule>  phenytoin   Capsule 200 milliGRAM(s) Oral two times a day  predniSONE   Tablet 10 milliGRAM(s) Oral daily  ramelteon 8 milliGRAM(s) Oral at bedtime  sevelamer carbonate 800 milliGRAM(s) Oral three times a day with meals  sirolimus 7 milliGRAM(s) Oral <User Schedule>  trimethoprim   80 mG/sulfamethoxazole 400 mG 1 Tablet(s) Oral daily  valGANciclovir 450 milliGRAM(s) Oral <User Schedule>  warfarin 5 milliGRAM(s) Oral once    MEDICATIONS  (PRN):  acetaminophen     Tablet .. 650 milliGRAM(s) Oral every 6 hours PRN Temp greater or equal to 38C (100.4F), Mild Pain (1 - 3)  dextrose Oral Gel 15 Gram(s) Oral once PRN Blood Glucose LESS THAN 70 milliGRAM(s)/deciliter  polyethylene glycol 3350 17 Gram(s) Oral daily PRN Constipation      VITALS:  T(F): 98.8 (09-24-22 @ 09:58), Max: 98.8 (09-23-22 @ 14:15)  HR: 63 (09-24-22 @ 09:58) (58 - 72)  BP: 134/61 (09-24-22 @ 09:58) (133/61 - 161/64)  RR: 16 (09-24-22 @ 09:58) (15 - 17)  SpO2: 98% (09-24-22 @ 09:58)      REVIEW OF SYSTEMS:  For ROV please refer back to H&P     PHYSICAL EXAM:  General: NAD, A/O x 2, weak appearing  Chest: permacath C/D/I  ENT: MMM, no scleral icterus  Neck: Supple, No JVD, no thyroidomegaly  Lungs: Clear to auscultation bilaterally, no wheezes, no rales, no rhonchi, good inspiratory effort  Cardio: RRR, S1/S2, No murmurs  Abdomen: Soft, Nontender, Nondistended; Bowel sounds present  Extremities: No calf tenderness, No pitting edema, no skin changes      LABS:              8.4                  129  | 27   | 79           4.17  >-----------< 262     ------------------------< 201                   27.3                 4.1  | 89   | 5.72                                         Ca 8.8   Mg x     Ph x           TPro  7.2  /  Alb  2.1      TBili  0.4  /  DBili  x         AST  41  /  ALT  38            AlkPhos  738      INR: 2.49 ratio<H>;    PT: 29.2 sec<H>;    PTT: x           CAPILLARY BLOOD GLUCOSE  POCT Blood Glucose.: 122 mg/dL (24 Sep 2022 08:02)  POCT Blood Glucose.: 185 mg/dL (23 Sep 2022 22:04)  POCT Blood Glucose.: 77 mg/dL (23 Sep 2022 17:31)  POCT Blood Glucose.: 250 mg/dL (23 Sep 2022 11:56)      RADIOLOGY & ADDITIONAL TESTS:    Imaging Personally Reviewed:    [X] Consultant(s) Notes Reviewed:  [X] Care Discussed with Consultants/Other Providers:

## 2022-09-25 NOTE — PROGRESS NOTE ADULT - SUBJECTIVE AND OBJECTIVE BOX
SUBJECTIVE/ROS:  Patient seen bedside  Offers no complaints    Vital Signs Last 24 Hrs  T(C): 36.4 (24 Sep 2022 20:35), Max: 36.4 (24 Sep 2022 20:34)  T(F): 97.5 (24 Sep 2022 20:35), Max: 97.5 (24 Sep 2022 20:34)  HR: 67 (25 Sep 2022 04:48) (60 - 67)  BP: 164/61 (25 Sep 2022 04:48) (156/66 - 164/61)  RR: 16 (24 Sep 2022 20:35) (16 - 16)  SpO2: 97% (24 Sep 2022 20:35) (94% - 97%)    Exam   Gen - NAD, comfortable on RA  HEENT - NCAT, EOMI, MMM  Neck - Supple, No limited ROM  Pulm - resp non labored  Cardiovascular - warm and well perfused  Chest - left chest wall permcath  Abdomen -  semi tympanic, NT  Extremities - No Cyanosis, no clubbing, minimal edema to b/l ankles, no calf tenderness  Neuro-     Cognitive - awake, alert, oriented to person and place. Delayed processing/response     Cranial Nerves - CN 2-12 intact. No facial asymmetry, Tongue midline, EOMI, Shoulder shrug intact     Motor -                     LEFT    UE - 3+5                    RIGHT UE - 3-/5                    LEFT    LE - KE 4/5                    RIGHT LE - KE 4/5        Sensory - Intact to LT bilaterally  MSK: generalized weakness  Skin:  stage 2 pressure ulcer to sacrum 0.3 x 0.2,  discoloration to lateral RLQ of abdomen    PT/INR - ( 24 Sep 2022 06:10 )   PT: 29.2 sec;   INR: 2.49 ratio       PT/INR - ( 23 Sep 2022 06:00 )   PT: 31.5 sec;   INR: 2.69 ratio      Allergies  No Known Allergies    MEDICATIONS  (STANDING):  amLODIPine   Tablet 10 milliGRAM(s) Oral daily  artificial  tears Solution 2 Drop(s) Both EYES every 6 hours  brivaracetam 50 milliGRAM(s) Oral <User Schedule>  brivaracetam 50 milliGRAM(s) Oral <User Schedule>  carvedilol 12.5 milliGRAM(s) Oral every 12 hours  chlorhexidine 2% Cloths 1 Application(s) Topical <User Schedule>  dextrose 5%. 1000 milliLiter(s) (50 mL/Hr) IV Continuous <Continuous>  dextrose 5%. 1000 milliLiter(s) (100 mL/Hr) IV Continuous <Continuous>  dextrose 50% Injectable 25 Gram(s) IV Push once  dextrose 50% Injectable 12.5 Gram(s) IV Push once  dextrose 50% Injectable 25 Gram(s) IV Push once  doxazosin 4 milliGRAM(s) Oral <User Schedule>  epoetin cortney-epbx (RETACRIT) Injectable 47819 Unit(s) IV Push <User Schedule>  finasteride 5 milliGRAM(s) Oral daily  furosemide    Tablet 80 milliGRAM(s) Oral daily  glucagon  Injectable 1 milliGRAM(s) IntraMuscular once  insulin glargine Injectable (LANTUS) 5 Unit(s) SubCutaneous at bedtime  insulin lispro (ADMELOG) corrective regimen sliding scale   SubCutaneous three times a day before meals  insulin lispro (ADMELOG) corrective regimen sliding scale   SubCutaneous at bedtime  insulin lispro Injectable (ADMELOG) 5 Unit(s) SubCutaneous three times a day before meals  levothyroxine 50 MICROGram(s) Oral daily  multivitamin 1 Tablet(s) Oral daily  mycophenolate mofetil 500 milliGRAM(s) Oral <User Schedule>  nystatin    Suspension 268896 Unit(s) Oral four times a day  pantoprazole    Tablet 40 milliGRAM(s) Oral <User Schedule>  phenytoin   Capsule 200 milliGRAM(s) Oral two times a day  predniSONE   Tablet 10 milliGRAM(s) Oral daily  ramelteon 8 milliGRAM(s) Oral at bedtime  sevelamer carbonate 800 milliGRAM(s) Oral three times a day with meals  sirolimus 7 milliGRAM(s) Oral <User Schedule>  trimethoprim   80 mG/sulfamethoxazole 400 mG 1 Tablet(s) Oral daily  valGANciclovir 450 milliGRAM(s) Oral <User Schedule>  warfarin 5 milliGRAM(s) Oral once    MEDICATIONS  (PRN):  acetaminophen     Tablet .. 650 milliGRAM(s) Oral every 6 hours PRN Temp greater or equal to 38C (100.4F), Mild Pain (1 - 3)  dextrose Oral Gel 15 Gram(s) Oral once PRN Blood Glucose LESS THAN 70 milliGRAM(s)/deciliter  polyethylene glycol 3350 17 Gram(s) Oral daily PRN Constipation   SUBJECTIVE/ROS:  Patient seen bedside  Offers no complaints    Vital Signs Last 24 Hrs  T(C): 36.4 (24 Sep 2022 20:35), Max: 36.4 (24 Sep 2022 20:34)  T(F): 97.5 (24 Sep 2022 20:35), Max: 97.5 (24 Sep 2022 20:34)  HR: 67 (25 Sep 2022 04:48) (60 - 67)  BP: 164/61 (25 Sep 2022 04:48) (156/66 - 164/61)  RR: 16 (24 Sep 2022 20:35) (16 - 16)  SpO2: 97% (24 Sep 2022 20:35) (94% - 97%)    Exam   Gen - NAD, comfortable on RA  HEENT - NCAT, EOMI, MMM  Neck - Supple, No limited ROM  Pulm - resp non labored  Cardiovascular - warm and well perfused  Chest - left chest wall permcath  Abdomen -  semi tympanic, NT  Extremities - No Cyanosis, no clubbing, minimal edema to b/l ankles, no calf tenderness  Neuro-     Cognitive - awake, alert, oriented to person and place. Delayed processing/response     Cranial Nerves - CN 2-12 intact. No facial asymmetry, Tongue midline, EOMI, Shoulder shrug intact     Motor -                     LEFT    UE - 3+5                    RIGHT UE - 3-/5                    LEFT    LE - KE 4/5                    RIGHT LE - KE 4/5        Sensory - Intact to LT bilaterally  MSK: generalized weakness  Skin:  stage 2 pressure ulcer to sacrum 0.3 x 0.2,  discoloration to lateral RLQ of abdomen    PT/INR - ( 25 Sep 2022 05:40 )   PT: 28.5 sec;   INR: 2.43 ratio    PT/INR - ( 24 Sep 2022 06:10 )   PT: 29.2 sec;   INR: 2.49 ratio    PT/INR - ( 23 Sep 2022 06:00 )   PT: 31.5 sec;   INR: 2.69 ratio      Allergies  No Known Allergies    MEDICATIONS  (STANDING):  amLODIPine   Tablet 10 milliGRAM(s) Oral daily  artificial  tears Solution 2 Drop(s) Both EYES every 6 hours  brivaracetam 50 milliGRAM(s) Oral <User Schedule>  brivaracetam 50 milliGRAM(s) Oral <User Schedule>  carvedilol 12.5 milliGRAM(s) Oral every 12 hours  chlorhexidine 2% Cloths 1 Application(s) Topical <User Schedule>  dextrose 5%. 1000 milliLiter(s) (50 mL/Hr) IV Continuous <Continuous>  dextrose 5%. 1000 milliLiter(s) (100 mL/Hr) IV Continuous <Continuous>  dextrose 50% Injectable 25 Gram(s) IV Push once  dextrose 50% Injectable 12.5 Gram(s) IV Push once  dextrose 50% Injectable 25 Gram(s) IV Push once  doxazosin 4 milliGRAM(s) Oral <User Schedule>  epoetin cortney-epbx (RETACRIT) Injectable 59967 Unit(s) IV Push <User Schedule>  finasteride 5 milliGRAM(s) Oral daily  furosemide    Tablet 80 milliGRAM(s) Oral daily  glucagon  Injectable 1 milliGRAM(s) IntraMuscular once  insulin glargine Injectable (LANTUS) 5 Unit(s) SubCutaneous at bedtime  insulin lispro (ADMELOG) corrective regimen sliding scale   SubCutaneous three times a day before meals  insulin lispro (ADMELOG) corrective regimen sliding scale   SubCutaneous at bedtime  insulin lispro Injectable (ADMELOG) 5 Unit(s) SubCutaneous three times a day before meals  levothyroxine 50 MICROGram(s) Oral daily  multivitamin 1 Tablet(s) Oral daily  mycophenolate mofetil 500 milliGRAM(s) Oral <User Schedule>  nystatin    Suspension 115452 Unit(s) Oral four times a day  pantoprazole    Tablet 40 milliGRAM(s) Oral <User Schedule>  phenytoin   Capsule 200 milliGRAM(s) Oral two times a day  predniSONE   Tablet 10 milliGRAM(s) Oral daily  ramelteon 8 milliGRAM(s) Oral at bedtime  sevelamer carbonate 800 milliGRAM(s) Oral three times a day with meals  sirolimus 7 milliGRAM(s) Oral <User Schedule>  trimethoprim   80 mG/sulfamethoxazole 400 mG 1 Tablet(s) Oral daily  valGANciclovir 450 milliGRAM(s) Oral <User Schedule>  warfarin 5 milliGRAM(s) Oral once    MEDICATIONS  (PRN):  acetaminophen     Tablet .. 650 milliGRAM(s) Oral every 6 hours PRN Temp greater or equal to 38C (100.4F), Mild Pain (1 - 3)  dextrose Oral Gel 15 Gram(s) Oral once PRN Blood Glucose LESS THAN 70 milliGRAM(s)/deciliter  polyethylene glycol 3350 17 Gram(s) Oral daily PRN Constipation

## 2022-09-25 NOTE — PROGRESS NOTE ADULT - SUBJECTIVE AND OBJECTIVE BOX
Hospitalist: Alex Steel DO    CHIEF COMPLAINT: Patient is a 66y old  male who presents with a chief complaint of Debility (24 Sep 2022 13:08)      SUBJECTIVE / OVERNIGHT EVENTS: Patient seen and examined. No acute events overnight. Pain well controlled and patient without any complaints.    MEDICATIONS  (STANDING):  amLODIPine   Tablet 10 milliGRAM(s) Oral daily  artificial  tears Solution 2 Drop(s) Both EYES every 6 hours  brivaracetam 50 milliGRAM(s) Oral <User Schedule>  brivaracetam 50 milliGRAM(s) Oral <User Schedule>  carvedilol 12.5 milliGRAM(s) Oral every 12 hours  chlorhexidine 2% Cloths 1 Application(s) Topical <User Schedule>  dextrose 5%. 1000 milliLiter(s) (50 mL/Hr) IV Continuous <Continuous>  dextrose 5%. 1000 milliLiter(s) (100 mL/Hr) IV Continuous <Continuous>  dextrose 50% Injectable 25 Gram(s) IV Push once  dextrose 50% Injectable 12.5 Gram(s) IV Push once  dextrose 50% Injectable 25 Gram(s) IV Push once  doxazosin 4 milliGRAM(s) Oral <User Schedule>  epoetin cortney-epbx (RETACRIT) Injectable 48932 Unit(s) IV Push <User Schedule>  finasteride 5 milliGRAM(s) Oral daily  furosemide    Tablet 80 milliGRAM(s) Oral daily  glucagon  Injectable 1 milliGRAM(s) IntraMuscular once  insulin glargine Injectable (LANTUS) 5 Unit(s) SubCutaneous at bedtime  insulin lispro (ADMELOG) corrective regimen sliding scale   SubCutaneous three times a day before meals  insulin lispro (ADMELOG) corrective regimen sliding scale   SubCutaneous at bedtime  insulin lispro Injectable (ADMELOG) 5 Unit(s) SubCutaneous three times a day before meals  levothyroxine 50 MICROGram(s) Oral daily  multivitamin 1 Tablet(s) Oral daily  mycophenolate mofetil 500 milliGRAM(s) Oral <User Schedule>  nystatin    Suspension 564244 Unit(s) Oral four times a day  pantoprazole    Tablet 40 milliGRAM(s) Oral <User Schedule>  phenytoin   Capsule 200 milliGRAM(s) Oral two times a day  predniSONE   Tablet 10 milliGRAM(s) Oral daily  ramelteon 8 milliGRAM(s) Oral at bedtime  sevelamer carbonate 800 milliGRAM(s) Oral three times a day with meals  sirolimus 7 milliGRAM(s) Oral <User Schedule>  trimethoprim   80 mG/sulfamethoxazole 400 mG 1 Tablet(s) Oral daily  valGANciclovir 450 milliGRAM(s) Oral <User Schedule>  warfarin 5 milliGRAM(s) Oral once    MEDICATIONS  (PRN):  acetaminophen     Tablet .. 650 milliGRAM(s) Oral every 6 hours PRN Temp greater or equal to 38C (100.4F), Mild Pain (1 - 3)  dextrose Oral Gel 15 Gram(s) Oral once PRN Blood Glucose LESS THAN 70 milliGRAM(s)/deciliter  polyethylene glycol 3350 17 Gram(s) Oral daily PRN Constipation      VITALS:  T(F): 97.5 (09-24-22 @ 20:35), Max: 97.5 (09-24-22 @ 20:34)  HR: 67 (09-25-22 @ 04:48) (60 - 67)  BP: 164/61 (09-25-22 @ 04:48) (156/66 - 164/61)  RR: 16 (09-24-22 @ 20:35) (16 - 16)  SpO2: 97% (09-24-22 @ 20:35)      REVIEW OF SYSTEMS:  For ROV please refer back to H&P     PHYSICAL EXAM:  General: NAD, A/O x 2, weak appearing  Chest: permacath C/D/I  ENT: MMM, no scleral icterus  Neck: Supple, No JVD, no thyroidomegaly  Lungs: Clear to auscultation bilaterally, no wheezes, no rales, no rhonchi, good inspiratory effort  Cardio: RRR, S1/S2, No murmurs  Abdomen: Soft, Nontender, Nondistended; Bowel sounds present  Extremities: No calf tenderness, No pitting edema, no skin changes        LABS:              8.4                  129  | 27   | 79           4.17  >-----------< 262     ------------------------< 201                   27.3                 4.1  | 89   | 5.72                                         Ca 8.8   Mg x     Ph x           TPro  7.2  /  Alb  2.1      TBili  0.4  /  DBili  x         AST  41  /  ALT  38            AlkPhos  738      INR: 2.43 ratio<H>;    PT: 28.5 sec<H>;    PTT: x           CAPILLARY BLOOD GLUCOSE      POCT Blood Glucose.: 259 mg/dL (25 Sep 2022 08:24)  POCT Blood Glucose.: 236 mg/dL (24 Sep 2022 22:08)  POCT Blood Glucose.: 176 mg/dL (24 Sep 2022 17:05)  POCT Blood Glucose.: 213 mg/dL (24 Sep 2022 11:33)        MICROBIOLOGY:          RADIOLOGY & ADDITIONAL TESTS:    Imaging Personally Reviewed:    [X] Consultant(s) Notes Reviewed:  [X] Care Discussed with Consultants/Other Providers:

## 2022-09-25 NOTE — PROGRESS NOTE ADULT - ASSESSMENT
66 yr old Male with PMHx of DM type II, HTN, CAD s/p stents/ CABG (2020), AFib on Eliquis, CVA (2019) d/t Afib, Seizure d/o following CVA, last episode was on 4/8/22, h/o GIB (2/2022) EGD with duodenal ulcer.  ESRD due to DM was on HD since 2019, s/p premacath removal 5/10/22 s/p Right DDRT (4/21/22) and placed on belatacept.  Frequent hospitalization d/t weakness, refractory sz, and sepsis.  Admitted and intubated 6/10, found to large pleural effusion s/p thoracentesis ~1.3 and VATS of thoracic 2.2 L.      TRANSPLANT PATIENT -  * INR 2.49 (9/24)- coumadin 5 dose for today  * transplant team PHONE  --Iker Alfred (MD) ??  Dr. Eduardo Barber (transplant surgeon) - Darleen Dior NP [phone]*  * HD M/W/F -     COMORBIDITES/ACTIVE MEDICAL ISSUES   Gait Instability, ADL impairments and Functional impairments secondary to recurrent UTI, status epilepticus, with debility seizures. 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  Belatacept d/t 8/1 and was started on Sirolimus 7mg QD  - Prednisone 10  - Cellcept 250 BID on hold  - S/p perma cath placement 8/30/2022  - PPx medications: Nystatin, Bactrim, Valcyte (MWF)  - Nephrology following - modified to [facility]  - Strongyloides Antibodies positive - Ivermectin x1 dose (9/21)    #Anemia of chronic disease  - Has gotten total of 6U PRBC and 2 U FFP during acute hospital   - monitor H/H 8.0 (9/12) - 8.4 (9/23) stable    #HTN  #Afib  - Eliquis switched to Coumadin  - Coreg   - Daily INR til therapeutic on a consistent coumadin dose    #Diabetes  - A1C  - Lantus  - standing Admelog dc, restarted 9/16 @ 3U with meals  - FS + ISS  - Hospitalist following   - -236 (9/24) - fluctuates    #Hypothyroidism  - Synthroid 37.5 mcg    #Pain control  - Tylenol PRN    #GI/Bowel Mgmt   - At risk for constipation due to neurologic diagnosis, immobility and/or medication use  - Senna d/c d/t loose BM 9/11  -  Miralax PRN    #Bladder management  - incontinence     #DVT   - acute DVT to R IJ thrombosis, Superficial vein thrombus to right basilic and cephalic vein  - Coumadin  - SCDs, TEDs     #Skin:  -Stage 2 pressure ulcer to sacrum 0.3 x 0.2- cleanse with NS, apply wet gauze andcover with allevyn foam dressing  -At risk for pressure injury due to neurologic diagnosis and relative immobility  -Bilateral heels - soft heel protectors, apply liquid barrier film daily and monitor for tissue type changes  - Turn Q2 hr while in bed, air mattress  - Skin barrier cream as needed  - Nursing to monitor skin Qshift      Diet  - Regular/easy to chew + Thins  [[facility], renal diet]    + Fluid Restriction: 1L    Precautions / PROPHYLAXIS:   - Falls, Cardiac, Seizure   - ortho: Weight bearing status: WBAT   - Lungs: Aspiration, Incentive Spirometer   - Pressure injury/Skin: Turn Q2hrs while in bed, OOB to Chair, PT/OT       66 yr old Male with PMHx of DM type II, HTN, CAD s/p stents/ CABG (2020), AFib on Eliquis, CVA (2019) d/t Afib, Seizure d/o following CVA, last episode was on 4/8/22, h/o GIB (2/2022) EGD with duodenal ulcer.  ESRD due to DM was on HD since 2019, s/p premacath removal 5/10/22 s/p Right DDRT (4/21/22) and placed on belatacept.  Frequent hospitalization d/t weakness, refractory sz, and sepsis.  Admitted and intubated 6/10, found to large pleural effusion s/p thoracentesis ~1.3 and VATS of thoracic 2.2 L.      TRANSPLANT PATIENT -  * INR 2.49 (9/24)- coumadin 5 dose for today  * transplant team PHONE  --Iker Alfred (MD) ??  Dr. Eduardo Barber (transplant surgeon) - Darleen Dior NP [phone]*  * HD M/W/F -     COMORBIDITES/ACTIVE MEDICAL ISSUES   Gait Instability, ADL impairments and Functional impairments secondary to recurrent UTI, status epilepticus, with debility seizures. 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  Belatacept d/t 8/1 and was started on Sirolimus 7mg QD  - Prednisone 10  - Cellcept 250 BID on hold  - S/p perma cath placement 8/30/2022  - PPx medications: Nystatin, Bactrim, Valcyte (MWF)  - Nephrology following - modified to [facility]  - Strongyloides Antibodies positive - Ivermectin x1 dose (9/21)    #Anemia of chronic disease  - Has gotten total of 6U PRBC and 2 U FFP during acute hospital   - monitor H/H 8.0 (9/12) - 8.4 (9/23) stable    #HTN  #Afib  - Eliquis switched to Coumadin  - Coreg   - Daily INR til therapeutic on a consistent coumadin dose    #Diabetes  - A1C  - Lantus  - standing Admelog dc, restarted 9/16 @ 3U with meals  - FS + ISS  - Hospitalist following   - -259 (9/25) - fluctuates    #Hypothyroidism  - Synthroid 37.5 mcg    #Pain control  - Tylenol PRN    #GI/Bowel Mgmt   - At risk for constipation due to neurologic diagnosis, immobility and/or medication use  - Senna d/c d/t loose BM 9/11  -  Miralax PRN    #Bladder management  - incontinence     #DVT   - acute DVT to R IJ thrombosis, Superficial vein thrombus to right basilic and cephalic vein  - Coumadin-Therapeutic. Continue present dose  - SCDs, TEDs     #Skin:  -Stage 2 pressure ulcer to sacrum 0.3 x 0.2- cleanse with NS, apply wet gauze andcover with allevyn foam dressing  -At risk for pressure injury due to neurologic diagnosis and relative immobility  -Bilateral heels - soft heel protectors, apply liquid barrier film daily and monitor for tissue type changes  - Turn Q2 hr while in bed, air mattress  - Skin barrier cream as needed  - Nursing to monitor skin Qshift    Diet  - Regular/easy to chew + Thins  [[facility], renal diet]    + Fluid Restriction: 1L    Precautions / PROPHYLAXIS:   - Falls, Cardiac, Seizure   - ortho: Weight bearing status: WBAT   - Lungs: Aspiration, Incentive Spirometer   - Pressure injury/Skin: Turn Q2hrs while in bed, OOB to Chair, PT/OT

## 2022-09-25 NOTE — PROGRESS NOTE ADULT - ASSESSMENT
66M with DM2, HTN, CAD with stents/CABG, AF on Coumadin, hx CVA, seizure, PUD, s/p failed renal transplant now back on HD, recent hospitalization for seizures requiring intubation and course complicated by UTI, as well as need for VATS due to large hemothorax, now in rehab     #Seizure disorder: c/w phenytoin and Brivaracetam     #ESRD on HD / failed renal transplant: c/w HD as per renal, c/w immunosuppression, c/w infectious disease prophylaxis    #Anemia of chronic disease: stable, c/w EPO    #Chronic AF on Coumadin: goal INR 2-3, INR therapeutic, c/w Coumadin    #DM2 with steroid-induced hyperglycemia  A1C with Estimated Average Glucose Result: 6.4 %  Noted BS from yesterday running on higher side  Will increase premeal admelog to 5units  C/w lantus 5units qhs  C/w hypoglycemia protocol    #Hypothyroid: c/w synthroid    #Right IJ DVT: coumadin tonight    #Urinary retention: c/w Proscar and Doxazosin     #Abdominal wall cellulitis: Completed course of antibiotics    #DVT ppx: Coumadin

## 2022-09-26 NOTE — PROGRESS NOTE ADULT - ASSESSMENT
66 yr old Male with PMHx of DM type II, HTN, CAD s/p stents/ CABG (2020), AFib on Eliquis, CVA (2019) d/t Afib, Seizure d/o following CVA, last episode was on 4/8/22, h/o GIB (2/2022) EGD with duodenal ulcer.  ESRD due to DM was on HD since 2019, s/p premacath removal 5/10/22 s/p Right DDRT (4/21/22) and placed on belatacept.  Frequent hospitalization d/t weakness, refractory sz, and sepsis.  Admitted and intubated 6/10, found to large pleural effusion s/p thoracentesis ~1.3 and VATS of thoracic 2.2 L.      TRANSPLANT PATIENT -  * INR  pending with HD labs -  coumadin to be dosed******  * transplant team PHONE  --Iker Alrfed (MD) ??  Dr. Eduardo Barber (transplant surgeon) - Darleen Dior NP [phone]*  * Barby MUNOZ - recommendations for labs: BK virus DNA (negative 9/22), lactate (367 9/15-> 340 9/22 ), mag (2.0 -> 1.9), parathyroid (202-> 178), phosphorus (6.9 9/19 -> 3.7), CRP (230 -> 190), uric acid (3.2 -> 3.2), and UA (oliguria).  CMV pcr (negative 9/22)  * HD M/W/F - need additional brivaracetam after HD    COMORBIDITES/ACTIVE MEDICAL ISSUES   Gait Instability, ADL impairments and Functional impairments secondary to recurrent UTI, status epilepticus, with debility seizures. 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  Belatacept d/t 8/1 and was started on Sirolimus 7mg QD  - Prednisone 10  - Cellcept 250 BID on hold  - S/p perma cath placement 8/30/2022  - PPx medications: Nystatin, Bactrim, Valcyte (MWF)  - Nephrology following - modified to [facility]  - Strongyloides Antibodies positive - Ivermectin x1 dose (9/21)    #Anemia of chronic disease  - Has gotten total of 6U PRBC and 2 U FFP during acute hospital   - monitor H/H 8.0 (9/12) - 8.4 (9/23) -> pending for 9/26    #HTN  #Afib  - Eliquis switched to Coumadin  - Coreg   - Daily INR til therapeutic on a consistent coumadin dose    #Diabetes  - A1C  - Lantus  - standing Admelog dc, restarted 9/16 @ 3U with meals  - FS + ISS  - Hospitalist following   - -217 (9/26) - fluctuates    #Hypothyroidism  - Synthroid 37.5 mcg    #Pain control  - Tylenol PRN    #GI/Bowel Mgmt   - At risk for constipation due to neurologic diagnosis, immobility and/or medication use  - Senna d/c d/t loose BM 9/11  -  Miralax PRN    #Bladder management  - incontinence     #DVT   - acute DVT to R IJ thrombosis, Superficial vein thrombus to right basilic and cephalic vein  - Coumadin  - SCDs, TEDs     #Skin:  -Stage 2 pressure ulcer to sacrum 0.3 x 0.2- cleanse with NS, apply wet gauze andcover with allevyn foam dressing  -At risk for pressure injury due to neurologic diagnosis and relative immobility  -Bilateral heels - soft heel protectors, apply liquid barrier film daily and monitor for tissue type changes  - Turn Q2 hr while in bed, air mattress  - Skin barrier cream as needed  - Nursing to monitor skin Qshift    Diet  - Regular/easy to chew + Thins  [[facility], renal diet]    + Fluid Restriction: 1L    Precautions / PROPHYLAXIS:   - Falls, Cardiac, Seizure   - ortho: Weight bearing status: WBAT   - Lungs: Aspiration, Incentive Spirometer   - Pressure injury/Skin: Turn Q2hrs while in bed, OOB to Chair, PT/OT      liaison with family 9/12--Spoke with son Dr Perez  He gave update on patient's clinical state, investigation results and function prior to acute rehab treatment   I gave update on patients current status, and he was happy with same  Plan to to give interval updates to family as needed    9/22/22--I discussed with his second son (an RN) at patient's bed side  He reports family's plan for home dc, arrangements made to care for patient--his wife is always at home, and sons make time available    IDT 9/19   lives with spouse and 2 sons in  with 6 JARRETT, 1st [place] setup.  PTA needed some assistance with ADLs  Functional status: total for toileting, mod - max A with self care, needs lots of cues, posterolateral lean.  Slight/slow gains.  Mod A amb with WC follow.  Mod A x 1 using RW, min A with sit to stand to chair.  Amb 20' mod A x 1 WC follow.  Goal: min A/needs hands on A, mod A shower transfer  SLP: discharge as per family request   TDD: 9/29 need 24/7 assistance (has 6 JARRETT) Home vs OK    Follow ups:  Andre Patterson)  Clinical Neurophysiology; EEGEpilepsy; Neurology  [address], [address]  [place] [address]  Phone: [phone]  Fax: [phone]  Follow Up Time:     Padmini Lincoln ()  Internal Medicine  [address], [address]  [place] [address]  Phone: [phone]  Fax: [phone]  Follow Up Time:     Iker Alfred)  Internal Medicine; Nephrology  [address] [place] [id number]  Phone: [phone]  Fax: [phone]  Follow Up Time:    Dr. Eduardo Barber - Organ Transplant    Iker Alfred  [facility] Physician [facility]  NEPHRO [address] [place] [address]  Scheduled Appointment: 09/20/2022    Iker Alfred Physician [facility]  NEPH[address]  Scheduled Appointment: 10/20/2022

## 2022-09-26 NOTE — PROGRESS NOTE ADULT - SUBJECTIVE AND OBJECTIVE BOX
Patient is a 66y old  Male who presents with a chief complaint of Debility    No events overnight    Patient seen and examined at bedside.    ALLERGIES:  No Known Allergies    MEDICATIONS  (STANDING):  amLODIPine   Tablet 10 milliGRAM(s) Oral daily  artificial  tears Solution 2 Drop(s) Both EYES every 6 hours  brivaracetam 50 milliGRAM(s) Oral <User Schedule>  brivaracetam 50 milliGRAM(s) Oral <User Schedule>  carvedilol 12.5 milliGRAM(s) Oral every 12 hours  chlorhexidine 2% Cloths 1 Application(s) Topical <User Schedule>  dextrose 5%. 1000 milliLiter(s) (100 mL/Hr) IV Continuous <Continuous>  dextrose 5%. 1000 milliLiter(s) (50 mL/Hr) IV Continuous <Continuous>  dextrose 50% Injectable 25 Gram(s) IV Push once  dextrose 50% Injectable 12.5 Gram(s) IV Push once  dextrose 50% Injectable 25 Gram(s) IV Push once  doxazosin 4 milliGRAM(s) Oral <User Schedule>  epoetin cortney-epbx (RETACRIT) Injectable 61416 Unit(s) IV Push <User Schedule>  finasteride 5 milliGRAM(s) Oral daily  furosemide    Tablet 80 milliGRAM(s) Oral daily  glucagon  Injectable 1 milliGRAM(s) IntraMuscular once  insulin glargine Injectable (LANTUS) 5 Unit(s) SubCutaneous at bedtime  insulin lispro (ADMELOG) corrective regimen sliding scale   SubCutaneous three times a day before meals  insulin lispro (ADMELOG) corrective regimen sliding scale   SubCutaneous at bedtime  insulin lispro Injectable (ADMELOG) 3 Unit(s) SubCutaneous three times a day before meals  levothyroxine 50 MICROGram(s) Oral daily  multivitamin 1 Tablet(s) Oral daily  mycophenolate mofetil 500 milliGRAM(s) Oral <User Schedule>  nystatin    Suspension 244306 Unit(s) Oral four times a day  pantoprazole    Tablet 40 milliGRAM(s) Oral <User Schedule>  phenytoin   Capsule 200 milliGRAM(s) Oral two times a day  predniSONE   Tablet 10 milliGRAM(s) Oral daily  ramelteon 8 milliGRAM(s) Oral at bedtime  sevelamer carbonate 800 milliGRAM(s) Oral three times a day with meals  sirolimus 7 milliGRAM(s) Oral <User Schedule>  trimethoprim   80 mG/sulfamethoxazole 400 mG 1 Tablet(s) Oral daily  valGANciclovir 450 milliGRAM(s) Oral <User Schedule>    MEDICATIONS  (PRN):  acetaminophen     Tablet .. 650 milliGRAM(s) Oral every 6 hours PRN Temp greater or equal to 38C (100.4F), Mild Pain (1 - 3)  dextrose Oral Gel 15 Gram(s) Oral once PRN Blood Glucose LESS THAN 70 milliGRAM(s)/deciliter  polyethylene glycol 3350 17 Gram(s) Oral daily PRN Constipation    Vital Signs Last 24 Hrs  T(C): 36.3 (25 Sep 2022 20:52), Max: 36.3 (25 Sep 2022 20:52)  T(F): 97.4 (25 Sep 2022 20:52), Max: 97.4 (25 Sep 2022 20:52)  HR: 67 (26 Sep 2022 05:20) (58 - 67)  BP: 153/66 (26 Sep 2022 05:20) (130/60 - 153/66)  RR: 16 (25 Sep 2022 20:52) (16 - 16)  SpO2: 97% (25 Sep 2022 20:52) (97% - 97%)    Parameters below as of 25 Sep 2022 20:52  Patient On (Oxygen Delivery Method): room air    PHYSICAL EXAM:  General: NAD, A/O x 2, weak appearing  Chest: permacath C/D/I  ENT: MMM, no scleral icterus  Neck: Supple, No JVD, no thyroidomegaly  Lungs: Clear to auscultation bilaterally, no wheezes, no rales, no rhonchi, good inspiratory effort  Cardio: RRR, S1/S2, No murmurs  Abdomen: Soft, Nontender, Nondistended; Bowel sounds present  Extremities: No calf tenderness, No pitting edema, no skin changes      LABS:                        8.1    5.31  )-----------( 281      ( 21 Sep 2022 14:45 )             26.6       09-21    128  |  89  |  95  ----------------------------<  244  4.3   |  25  |  6.08    Ca    8.6      21 Sep 2022 14:45  Phos  3.7     09-22  Mg     1.9     09-22    TPro  6.8  /  Alb  1.9  /  TBili  0.4  /  DBili  x   /  AST  38  /  ALT  43  /  AlkPhos  723  09-21     PT/INR - ( 23 Sep 2022 06:00 )   PT: 31.5 sec;   INR: 2.69 ratio       07-27 Chol -- LDL -- HDL -- Trig 118 mg/dL    POCT Blood Glucose.: 221 mg/dL (22 Sep 2022 08:25)  POCT Blood Glucose.: 246 mg/dL (21 Sep 2022 21:17)  POCT Blood Glucose.: 195 mg/dL (21 Sep 2022 17:49)  POCT Blood Glucose.: 241 mg/dL (21 Sep 2022 11:24)    COVID-19 PCR: NotDetec (09-21-22 @ 05:45)  COVID-19 PCR: NotDetec (09-15-22 @ 05:41)  COVID-19 PCR: NotDetec (09-09-22 @ 07:43)  COVID-19 PCR: NotDetec (09-07-22 @ 18:29)  COVID-19 PCR: NotDetec (09-01-22 @ 07:05)  COVID-19 PCR: NotDetec (09-21-22 @ 05:45)  COVID-19 PCR: NotDetec (09-15-22 @ 05:41)  COVID-19 PCR: NotDetec (09-09-22 @ 07:43)  COVID-19 PCR: NotDetec (09-07-22 @ 18:29)  COVID-19 PCR: NotDetec (09-01-22 @ 07:05)  COVID-19 PCR: NotDetec (08-29-22 @ 11:09)  COVID-19 PCR: NotDetec (08-25-22 @ 18:36)  COVID-19 PCR: NotDetec (08-22-22 @ 06:42)  COVID-19 PCR: NotDetec (08-15-22 @ 17:07)  COVID-19 PCR: NotDetec (08-07-22 @ 18:05)

## 2022-09-26 NOTE — PROGRESS NOTE ADULT - SUBJECTIVE AND OBJECTIVE BOX
SUBJECTIVE/ROS:  Patient seen and evaluated sitting up in WC   good appetite, ate 100% of his breakfast tray  Energy level better   Denies CP, palpitation, SOB, or cough  No HA, dizziness, or lightheadedness  no nausea or abd pain.  LBM    minimal to none urine output - on HD    Vital Signs Last 24 Hrs  T(C): 36.6 (22 @ 08:44), Max: 36.6 (22 @ 08:44)  T(F): 97.8 (22 @ 08:44), Max: 97.8 (22 @ 08:44)  HR: 60 (22 @ 08:44) (58 - 67)  BP: 151/71 (22 @ 08:44) (130/60 - 153/66)  RR: 16 (22 @ 08:44) (16 - 16)  SpO2: 97% (22 @ 08:44) (97% - 97%) on room air    Daily -  Admission weight () 69.8 kg       Daily Weight in k.4 (26 Sep 2022 05:20)    Gen - NAD, comfortable on RA  HEENT - NCAT, EOMI, MMM  Neck - Supple, No limited ROM  Pulm - resp non labored  Cardiovascular - warm and well perfused  Chest - left chest wall permcath  Abdomen -  distended, but nontender, tympanic  Extremities - No Cyanosis, no clubbing, no edema, no calf tenderness  Neuro-     Cognitive - awake, alert, oriented to person and place. Delayed processing/response     Cranial Nerves - CN 2-12 intact. No facial asymmetry, Tongue midline, EOMI, Shoulder shrug intact     Motor -                     LEFT    UE - 3+5                    RIGHT UE - 3-/5                    LEFT    LE - KE 4/5                    RIGHT LE - KE 4/5        Sensory - Intact to LT bilaterally  MSK: generalized weakness  Skin:  stage 2 pressure ulcer to sacrum 0.3 x 0.2,  discoloration to lateral RLQ of abdomen    RECENT LABS:           LAB - Lab with HD today    INR - pending with HD labs    CAPILLARY BLOOD GLUCOSE  POCT Blood Glucose.: 217 mg/dL (26 Sep 2022 07:54)  POCT Blood Glucose.: 112 mg/dL (25 Sep 2022 20:50)  POCT Blood Glucose.: 114 mg/dL (25 Sep 2022 17:12)  POCT Blood Glucose.: 231 mg/dL (25 Sep 2022 12:28)      Xray Chest 1 View- PORTABLE-Routine (Xray Chest 1 View- PORTABLE-Routine .) (22 @ 12:04) >  Interval exchange of the dialysis catheter. No evidence of pneumothorax.   No other significant change.    Allergies  No Known Allergies    MEDICATIONS  (STANDING):  amLODIPine   Tablet 10 milliGRAM(s) Oral daily  artificial  tears Solution 2 Drop(s) Both EYES every 6 hours  brivaracetam 50 milliGRAM(s) Oral <User Schedule>  brivaracetam 50 milliGRAM(s) Oral <User Schedule>  carvedilol 12.5 milliGRAM(s) Oral every 12 hours  chlorhexidine 2% Cloths 1 Application(s) Topical <User Schedule>  dextrose 5%. 1000 milliLiter(s) (100 mL/Hr) IV Continuous <Continuous>  dextrose 5%. 1000 milliLiter(s) (50 mL/Hr) IV Continuous <Continuous>  dextrose 50% Injectable 25 Gram(s) IV Push once  dextrose 50% Injectable 12.5 Gram(s) IV Push once  dextrose 50% Injectable 25 Gram(s) IV Push once  doxazosin 4 milliGRAM(s) Oral <User Schedule>  epoetin cortney-epbx (RETACRIT) Injectable 12967 Unit(s) IV Push <User Schedule>  finasteride 5 milliGRAM(s) Oral daily  furosemide    Tablet 80 milliGRAM(s) Oral daily  glucagon  Injectable 1 milliGRAM(s) IntraMuscular once  insulin glargine Injectable (LANTUS) 5 Unit(s) SubCutaneous at bedtime  insulin lispro (ADMELOG) corrective regimen sliding scale   SubCutaneous three times a day before meals  insulin lispro (ADMELOG) corrective regimen sliding scale   SubCutaneous at bedtime  insulin lispro Injectable (ADMELOG) 5 Unit(s) SubCutaneous three times a day before meals  levothyroxine 50 MICROGram(s) Oral daily  multivitamin 1 Tablet(s) Oral daily  mycophenolate mofetil 500 milliGRAM(s) Oral <User Schedule>  nystatin    Suspension 417039 Unit(s) Oral four times a day  pantoprazole    Tablet 40 milliGRAM(s) Oral <User Schedule>  phenytoin   Capsule 200 milliGRAM(s) Oral two times a day  predniSONE   Tablet 10 milliGRAM(s) Oral daily  ramelteon 8 milliGRAM(s) Oral at bedtime  sevelamer carbonate 800 milliGRAM(s) Oral three times a day with meals  sirolimus 7 milliGRAM(s) Oral <User Schedule>  trimethoprim   80 mG/sulfamethoxazole 400 mG 1 Tablet(s) Oral daily  valGANciclovir 450 milliGRAM(s) Oral <User Schedule>    MEDICATIONS  (PRN):  acetaminophen     Tablet .. 650 milliGRAM(s) Oral every 6 hours PRN Temp greater or equal to 38C (100.4F), Mild Pain (1 - 3)  dextrose Oral Gel 15 Gram(s) Oral once PRN Blood Glucose LESS THAN 70 milliGRAM(s)/deciliter  polyethylene glycol 3350 17 Gram(s) Oral daily PRN Constipation   SUBJECTIVE/ROS:  Patient seen and evaluated sitting up in WC   good appetite, ate 100% of his breakfast tray  Energy level better   Denies CP, palpitation, SOB, or cough  No HA, dizziness, or lightheadedness  no nausea or abd pain.  LBM    minimal to none urine output - on HD    Vital Signs Last 24 Hrs  T(C): 36.6 (22 @ 08:44), Max: 36.6 (22 @ 08:44)  T(F): 97.8 (22 @ 08:44), Max: 97.8 (22 @ 08:44)  HR: 60 (22 @ 08:44) (58 - 67)  BP: 151/71 (22 @ 08:44) (130/60 - 153/66)  RR: 16 (22 @ 08:44) (16 - 16)  SpO2: 97% (22 @ 08:44) (97% - 97%) on room air    Daily -  Admission weight () 69.8 kg       Daily Weight in k.4 (26 Sep 2022 05:20)    Gen - NAD, comfortable on RA  HEENT - NCAT, EOMI, MMM  Neck - Supple, No limited ROM  Pulm - diminished to bilateral base  Cardiovascular - warm and well perfused  Chest - left chest wall permcath  Abdomen -  distended, but nontender, tympanic  Extremities - No Cyanosis, no clubbing, no edema, no calf tenderness  Neuro-     Cognitive - awake, alert, oriented to person and place. Delayed processing/response     Cranial Nerves - CN 2-12 intact. No facial asymmetry, Tongue midline, EOMI, Shoulder shrug intact     Motor -                     LEFT    UE - 3+5                    RIGHT UE - 3-/5                    LEFT    LE - KE 4/5                    RIGHT LE - KE 4/5        Sensory - Intact to LT bilaterally  MSK: generalized weakness  Skin:  stage 2 pressure ulcer to sacrum 0.3 x 0.2,  discoloration to lateral RLQ of abdomen    RECENT LABS:           LAB - Lab with HD today    INR - pending with HD labs    CAPILLARY BLOOD GLUCOSE  POCT Blood Glucose.: 217 mg/dL (26 Sep 2022 07:54)  POCT Blood Glucose.: 112 mg/dL (25 Sep 2022 20:50)  POCT Blood Glucose.: 114 mg/dL (25 Sep 2022 17:12)  POCT Blood Glucose.: 231 mg/dL (25 Sep 2022 12:28)      Xray Chest 1 View- PORTABLE-Routine (Xray Chest 1 View- PORTABLE-Routine .) (22 @ 12:04) >  Interval exchange of the dialysis catheter. No evidence of pneumothorax.   No other significant change.    Allergies  No Known Allergies    MEDICATIONS  (STANDING):  amLODIPine   Tablet 10 milliGRAM(s) Oral daily  artificial  tears Solution 2 Drop(s) Both EYES every 6 hours  brivaracetam 50 milliGRAM(s) Oral <User Schedule>  brivaracetam 50 milliGRAM(s) Oral <User Schedule>  carvedilol 12.5 milliGRAM(s) Oral every 12 hours  chlorhexidine 2% Cloths 1 Application(s) Topical <User Schedule>  dextrose 5%. 1000 milliLiter(s) (100 mL/Hr) IV Continuous <Continuous>  dextrose 5%. 1000 milliLiter(s) (50 mL/Hr) IV Continuous <Continuous>  dextrose 50% Injectable 25 Gram(s) IV Push once  dextrose 50% Injectable 12.5 Gram(s) IV Push once  dextrose 50% Injectable 25 Gram(s) IV Push once  doxazosin 4 milliGRAM(s) Oral <User Schedule>  epoetin cortney-epbx (RETACRIT) Injectable 29498 Unit(s) IV Push <User Schedule>  finasteride 5 milliGRAM(s) Oral daily  furosemide    Tablet 80 milliGRAM(s) Oral daily  glucagon  Injectable 1 milliGRAM(s) IntraMuscular once  insulin glargine Injectable (LANTUS) 5 Unit(s) SubCutaneous at bedtime  insulin lispro (ADMELOG) corrective regimen sliding scale   SubCutaneous three times a day before meals  insulin lispro (ADMELOG) corrective regimen sliding scale   SubCutaneous at bedtime  insulin lispro Injectable (ADMELOG) 5 Unit(s) SubCutaneous three times a day before meals  levothyroxine 50 MICROGram(s) Oral daily  multivitamin 1 Tablet(s) Oral daily  mycophenolate mofetil 500 milliGRAM(s) Oral <User Schedule>  nystatin    Suspension 581400 Unit(s) Oral four times a day  pantoprazole    Tablet 40 milliGRAM(s) Oral <User Schedule>  phenytoin   Capsule 200 milliGRAM(s) Oral two times a day  predniSONE   Tablet 10 milliGRAM(s) Oral daily  ramelteon 8 milliGRAM(s) Oral at bedtime  sevelamer carbonate 800 milliGRAM(s) Oral three times a day with meals  sirolimus 7 milliGRAM(s) Oral <User Schedule>  trimethoprim   80 mG/sulfamethoxazole 400 mG 1 Tablet(s) Oral daily  valGANciclovir 450 milliGRAM(s) Oral <User Schedule>    MEDICATIONS  (PRN):  acetaminophen     Tablet .. 650 milliGRAM(s) Oral every 6 hours PRN Temp greater or equal to 38C (100.4F), Mild Pain (1 - 3)  dextrose Oral Gel 15 Gram(s) Oral once PRN Blood Glucose LESS THAN 70 milliGRAM(s)/deciliter  polyethylene glycol 3350 17 Gram(s) Oral daily PRN Constipation   SUBJECTIVE/ROS:  Patient seen and evaluated sitting up in WC   good appetite, ate 100% of his breakfast tray  Energy level better   Denies CP, palpitation, SOB, or cough  No HA, dizziness, or lightheadedness  no nausea or abd pain.  LBM    minimal to none urine output - on HD    Vital Signs Last 24 Hrs  T(C): 36.6 (22 @ 08:44), Max: 36.6 (22 @ 08:44)  T(F): 97.8 (22 @ 08:44), Max: 97.8 (22 @ 08:44)  HR: 60 (22 @ 08:44) (58 - 67)  BP: 151/71 (22 @ 08:44) (130/60 - 153/66)  RR: 16 (22 @ 08:44) (16 - 16)  SpO2: 97% (22 @ 08:44) (97% - 97%) on room air    Daily -  Admission weight () 69.8 kg       Daily Weight in k.4 (26 Sep 2022 05:20)    Gen - NAD, comfortable on RA  HEENT - NCAT, EOMI, MMM  Neck - Supple, No limited ROM  Pulm - diminished to bilateral base  Cardiovascular - warm and well perfused  Chest - left chest wall permcath  Abdomen -  distended, but nontender, tympanic  Extremities - No Cyanosis, no clubbing, no edema, no calf tenderness  Neuro-     Cognitive - awake, alert, oriented to person and place. Delayed processing/response     Cranial Nerves - CN 2-12 intact. No facial asymmetry, Tongue midline, EOMI, Shoulder shrug intact     Motor -                     LEFT    UE - 3+5                    RIGHT UE - 3-/5                    LEFT    LE - KE 4/5                    RIGHT LE - KE 4/5        Sensory - Intact to LT bilaterally  MSK: generalized weakness  Skin:  stage 2 pressure ulcer to sacrum 0.3 x 0.2,  discoloration to lateral RLQ of abdomen    Therapy--engaging, energy levels improved  Still has executive fxn deficits    RECENT LABS:           LAB - Lab with HD today    INR - pending with HD labs    CAPILLARY BLOOD GLUCOSE  POCT Blood Glucose.: 217 mg/dL (26 Sep 2022 07:54)  POCT Blood Glucose.: 112 mg/dL (25 Sep 2022 20:50)  POCT Blood Glucose.: 114 mg/dL (25 Sep 2022 17:12)  POCT Blood Glucose.: 231 mg/dL (25 Sep 2022 12:28)      Xray Chest 1 View- PORTABLE-Routine (Xray Chest 1 View- PORTABLE-Routine .) (22 @ 12:04) >  Interval exchange of the dialysis catheter. No evidence of pneumothorax.   No other significant change.    Allergies  No Known Allergies    MEDICATIONS  (STANDING):  amLODIPine   Tablet 10 milliGRAM(s) Oral daily  artificial  tears Solution 2 Drop(s) Both EYES every 6 hours  brivaracetam 50 milliGRAM(s) Oral <User Schedule>  brivaracetam 50 milliGRAM(s) Oral <User Schedule>  carvedilol 12.5 milliGRAM(s) Oral every 12 hours  chlorhexidine 2% Cloths 1 Application(s) Topical <User Schedule>  dextrose 5%. 1000 milliLiter(s) (100 mL/Hr) IV Continuous <Continuous>  dextrose 5%. 1000 milliLiter(s) (50 mL/Hr) IV Continuous <Continuous>  dextrose 50% Injectable 25 Gram(s) IV Push once  dextrose 50% Injectable 12.5 Gram(s) IV Push once  dextrose 50% Injectable 25 Gram(s) IV Push once  doxazosin 4 milliGRAM(s) Oral <User Schedule>  epoetin cortney-epbx (RETACRIT) Injectable 43313 Unit(s) IV Push <User Schedule>  finasteride 5 milliGRAM(s) Oral daily  furosemide    Tablet 80 milliGRAM(s) Oral daily  glucagon  Injectable 1 milliGRAM(s) IntraMuscular once  insulin glargine Injectable (LANTUS) 5 Unit(s) SubCutaneous at bedtime  insulin lispro (ADMELOG) corrective regimen sliding scale   SubCutaneous three times a day before meals  insulin lispro (ADMELOG) corrective regimen sliding scale   SubCutaneous at bedtime  insulin lispro Injectable (ADMELOG) 5 Unit(s) SubCutaneous three times a day before meals  levothyroxine 50 MICROGram(s) Oral daily  multivitamin 1 Tablet(s) Oral daily  mycophenolate mofetil 500 milliGRAM(s) Oral <User Schedule>  nystatin    Suspension 736724 Unit(s) Oral four times a day  pantoprazole    Tablet 40 milliGRAM(s) Oral <User Schedule>  phenytoin   Capsule 200 milliGRAM(s) Oral two times a day  predniSONE   Tablet 10 milliGRAM(s) Oral daily  ramelteon 8 milliGRAM(s) Oral at bedtime  sevelamer carbonate 800 milliGRAM(s) Oral three times a day with meals  sirolimus 7 milliGRAM(s) Oral <User Schedule>  trimethoprim   80 mG/sulfamethoxazole 400 mG 1 Tablet(s) Oral daily  valGANciclovir 450 milliGRAM(s) Oral <User Schedule>    MEDICATIONS  (PRN):  acetaminophen     Tablet .. 650 milliGRAM(s) Oral every 6 hours PRN Temp greater or equal to 38C (100.4F), Mild Pain (1 - 3)  dextrose Oral Gel 15 Gram(s) Oral once PRN Blood Glucose LESS THAN 70 milliGRAM(s)/deciliter  polyethylene glycol 3350 17 Gram(s) Oral daily PRN Constipation

## 2022-09-26 NOTE — PROGRESS NOTE ADULT - ASSESSMENT
66M with DM2, HTN, CAD with stents/CABG, AF on Coumadin, hx CVA, seizure, PUD, s/p failed renal transplant now back on HD, recent hospitalization for seizures requiring intubation and course complicated by UTI, as well as need for VATS due to large hemothorax, now in rehab     #Seizure disorder: c/w phenytoin and Brivaracetam     #ESRD on HD / failed renal transplant: c/w HD as per renal, c/w immunosuppression, c/w infectious disease prophylaxis    #Anemia of chronic disease: stable, c/w EPO    #Chronic AF on Coumadin: goal INR 2-3, INR 2.69 , c/w Coreg    #DM2 with steroid-induced hyperglycemia  A1C with Estimated Average Glucose Result: 6.4 %  Will increase premial to admelog 7 units  C/w lantus 5units qhs  C/w hypoglycemia protocol    #Hypothyroid: c/w synthroid    #Right IJ DVT: coumadin tonight    #Urinary retention: c/w Proscar and Doxazosin     #Abdominal wall cellulitis: Completed course of antibiotics    #DVT ppx: Coumadin     66M with DM2, HTN, CAD with stents/CABG, AF on Coumadin, hx CVA, seizure, PUD, s/p failed renal transplant now back on HD, recent hospitalization for seizures requiring intubation and course complicated by UTI, as well as need for VATS due to large hemothorax, now in rehab     #Seizure disorder: c/w phenytoin and Brivaracetam     #ESRD on HD / failed renal transplant: c/w HD as per renal, c/w immunosuppression, c/w infectious disease prophylaxis    #Anemia of chronic disease: stable, c/w EPO    #Chronic AF on Coumadin: goal INR 2-3,  c/w Coreg    #DM2 with steroid-induced hyperglycemia  A1C with Estimated Average Glucose Result: 6.4 %  Will increase premial to admelog 7 units  C/w lantus 5units qhs  C/w hypoglycemia protocol    #Hypothyroid: c/w synthroid    #Right IJ DVT: coumadin tonight    #Urinary retention: c/w Proscar and Doxazosin     #Abdominal wall cellulitis: Completed course of antibiotics    #DVT ppx: Coumadin

## 2022-09-26 NOTE — PROGRESS NOTE ADULT - SUBJECTIVE AND OBJECTIVE BOX
NEPHROLOGY PROGRESS NOTE    CHIEF COMPLAINT:    HPI:  renal transplant, ESRD    ROS:  Making little urine.      EXAM:  T(F): 97.8 (09-26-22 @ 08:44)  HR: 60 (09-26-22 @ 08:44)  BP: 151/71 (09-26-22 @ 08:44)  RR: 16 (09-26-22 @ 08:44)  SpO2: 97% (09-26-22 @ 08:44)    Conversant, in no apparent distress  Normal respiratory effort, lungs clear bilaterally  Heart RRR with no murmur, no peripheral edema      Assessment  S/p complicated hospital course as per HPI  Now in [facility]  S/p DDRT 4/21/22, required HD, came off HD since 5/5/22, back on HD since 7/27/22  Continue transplant meds per transplant team recommendations  Avoid nephrotoxins  HD MWF as ordered  Epoetin w/HD 3 x week  Renal diet  CBC, BMP

## 2022-09-27 NOTE — PROGRESS NOTE ADULT - ASSESSMENT
66 yr old Male with PMHx of DM type II, HTN, CAD s/p stents/ CABG (2020), AFib on Eliquis, CVA (2019) d/t Afib, Seizure d/o following CVA, last episode was on 4/8/22, h/o GIB (2/2022) EGD with duodenal ulcer.  ESRD due to DM was on HD since 2019, s/p premacath removal 5/10/22 s/p Right DDRT (4/21/22) and placed on belatacept.  Frequent hospitalization d/t weakness, refractory sz, and sepsis.  Admitted and intubated 6/10, found to large pleural effusion s/p thoracentesis ~1.3 and VATS of thoracic 2.2 L.      TRANSPLANT PATIENT -  * INR 2.35 (9/27) - coumadin 5mg for today  * transplant team PHONE  --Iker Alfred (MD) ??  Dr. Eduardo Barber (transplant surgeon) - Darleen Dior NP [phone]*  * Barby MUNOZ - recommendations for labs: BK virus DNA (negative 9/22), lactate (367 9/15-> 340 9/22 ), mag (2.0 -> 1.9), parathyroid (202-> 178), phosphorus (6.9 9/19 -> 3.7), CRP (230 -> 190), uric acid (3.2 -> 3.2), and UA (oliguria).  CMV pcr (negative 9/22)  * HD M/W/F - need additional brivaracetam after HD  * Recommend home O2 because he is vulnerable to oxygen desaturation by virtue of his renal disease and debility  * repeat CXR prior to discharge - will order 9/28    COMORBIDITES/ACTIVE MEDICAL ISSUES   Gait Instability, ADL impairments and Functional impairments secondary to recurrent UTI, status epilepticus, with debility seizures. 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  Belatacept d/t 8/1 and was started on Sirolimus 7mg QD  - Prednisone 10  - Cellcept 250 BID on hold  - S/p perma cath placement 8/30/2022  - PPx medications: Nystatin, Bactrim, Valcyte (MWF)  - Nephrology following - modified to [facility]  - Strongyloides Antibodies positive - Ivermectin x1 dose (9/21)    #Anemia of chronic disease  - Has gotten total of 6U PRBC and 2 U FFP during acute hospital   - monitor H/H 8.0 (9/12) - 8.4 (9/23) -> 7.8  (9/26)    #HTN  #Afib  - Eliquis switched to Coumadin  - Coreg   - Daily INR til therapeutic on a consistent coumadin dose    #Diabetes  - A1C  - Lantus  - standing Admelog dc, restarted 9/16 @ 3U with meals  - FS + ISS  - Hospitalist following   - -178 (9/27) stable    #Hypothyroidism  - Synthroid 37.5 mcg    #Pain control  - Tylenol PRN    #GI/Bowel Mgmt   - At risk for constipation due to neurologic diagnosis, immobility and/or medication use  - Senna d/c d/t loose BM 9/11  -  Miralax PRN    #Bladder management  - incontinence     #DVT   - acute DVT to R IJ thrombosis, Superficial vein thrombus to right basilic and cephalic vein  - Coumadin  - SCDs, TEDs     #Skin:  -Stage 2 pressure ulcer to sacrum 0.3 x 0.2- cleanse with NS, apply wet gauze andcover with allevyn foam dressing  -At risk for pressure injury due to neurologic diagnosis and relative immobility  -Bilateral heels - soft heel protectors, apply liquid barrier film daily and monitor for tissue type changes  - Turn Q2 hr while in bed, air mattress  - Skin barrier cream as needed  - Nursing to monitor skin Qshift    Diet  - Regular/easy to chew + Thins  [[facility], renal diet]    + Fluid Restriction: 1L    Precautions / PROPHYLAXIS:   - Falls, Cardiac, Seizure   - ortho: Weight bearing status: WBAT   - Lungs: Aspiration, Incentive Spirometer   - Pressure injury/Skin: Turn Q2hrs while in bed, OOB to Chair, PT/OT      liaison with family 9/12--Spoke with son Dr Perez  He gave update on patient's clinical state, investigation results and function prior to acute rehab treatment   I gave update on patients current status, and he was happy with same  Plan to to give interval updates to family as needed    9/22/22--I discussed with his second son (an RN) at patient's bed side  He reports family's plan for home dc, arrangements made to care for patient--his wife is always at home, and sons make time available    IDT 9/26   lives with spouse and 2 sons in [place] with 6 [facility], [facility] setup.  PTA needed some assistance with ADLs  Functional status: mod A x 1 with stand piovt with and wo RW (needs cutes), mod A to initiate task.  Eating with incidental Assist.  Transfers: sit->stand min A, but mod A + cues with stand to sit, mod A to WC. Ambulated 50' RW min + WCF  Goal: min A/needs hands on A, mod A shower transfer  SLP: discharge as per family   TDD: 9/29 Home need 24/7 assistance.  Family putting in ramp on Wednesday (9/28)    Follow ups:  nAdre Patterson)  Clinical Neurophysiology; EEGEpilepsy; Neurology  [address] [facility], [address]  [place] [address]  Phone: [phone]  Fax: [phone]  Follow Up Time:     Padmini Lincoln ()  Internal Medicine  [address] [facility], [address]  [place] [address]  Phone: [phone]  Fax: [phone]  Follow Up Time:     Iker Alfred)  Internal Medicine; Nephrology  [address] [place] [address]  Phone: [phone]  Fax: [phone]  Follow Up Time:    Dr. Eduardo Barber - Organ Transplant    Iker Alfred  [facility]  NEPH[address]  Scheduled Appointment: 09/20/2022    Iker Alfred  [address]  Scheduled Appointment: 10/20/2022

## 2022-09-27 NOTE — PROGRESS NOTE ADULT - SUBJECTIVE AND OBJECTIVE BOX
SUBJECTIVE/ROS:  Patient seen and evaluated sitting up in regular WC during PT  Tolerating therapy - ambulated 50' with RW + WCF with better gait quality  Wide awake  Denies CP, palpitation, SOB, or cough  No HA, dizziness, or lightheadedness  no nausea or abd pain.  LBM    minimal to none urine output - on HD (last HD yesterday)     Vital Signs Last 24 Hrs  T(C): 36.8 (22 @ 08:06), Max: 36.8 (22 @ 08:06)  T(F): 98.3 (22 @ 08:06), Max: 98.3 (22 @ 08:06)  HR: 60 (22 @ 08:06) (54 - 65)  BP: 149/70 (22 @ 08:06) (147/63 - 156/46)  RR: 16 (22 @ 08:06) (16 - 16)  SpO2: 100% (22 @ 08:06) (98% - 100%) on room air    Daily -  Admission weight () 69.8 kg        Daily Weight in k.9 (26 Sep 2022 17:30)    Gen - NAD, comfortable on RA  HEENT - NCAT, EOMI, MMM  Neck - Supple, No limited ROM  Pulm - diminished to bilateral base  Cardiovascular - warm and well perfused  Chest - left chest wall permcath  Abdomen -  distended, but nontender, tympanic  Extremities - No Cyanosis, no clubbing, no edema, no calf tenderness  Neuro-     Cognitive - awake, alert, oriented to person and place. Delayed processing/response     Cranial Nerves - CN 2-12 intact. No facial asymmetry, Tongue midline, EOMI, Shoulder shrug intact     Motor -                     LEFT    UE - 3+5                    RIGHT UE - 3-/5                    LEFT    LE - KE 4/5                    RIGHT LE - KE 4/5        Sensory - Intact to LT bilaterally  MSK: generalized weakness  Skin:  stage 2 pressure ulcer to sacrum 0.3 x 0.2,  discoloration to lateral RLQ of abdomen    Therapy--engaging, energy levels improved  Still has executive fxn deficits    RECENT LABS:           LAB                        7.8    5.91  )-----------( 284      ( 26 Sep 2022 14:30 )             25.1     09-26    130<L>  |  90<L>  |  112<H>  ----------------------------<  132<H>  4.2   |  25  |  7.10<H>    Ca    8.7      26 Sep 2022 14:30    TPro  7.0  /  Alb  2.0<L>  /  TBili  0.4  /  DBili  x   /  AST  20  /  ALT  28  /  AlkPhos  626<H>      LIVER FUNCTIONS - ( 26 Sep 2022 14:30 )  Alb: 2.0 g/dL / Pro: 7.0 g/dL / ALK PHOS: 626 U/L / ALT: 28 U/L / AST: 20 U/L / GGT: x           PT/INR - ( 27 Sep 2022 05:30 )   PT: 27.5 sec;   INR: 2.35 ratio      CAPILLARY BLOOD GLUCOSE  POCT Blood Glucose.: 178 mg/dL (27 Sep 2022 08:02)  POCT Blood Glucose.: 129 mg/dL (26 Sep 2022 21:37)  POCT Blood Glucose.: 103 mg/dL (26 Sep 2022 17:34)  POCT Blood Glucose.: 150 mg/dL (26 Sep 2022 11:53)        Xray Chest 1 View- PORTABLE-Routine (Xray Chest 1 View- PORTABLE-Routine .) (22 @ 12:04) >  Interval exchange of the dialysis catheter. No evidence of pneumothorax.   No other significant change.    Allergies  No Known Allergies    MEDICATIONS  (STANDING):  amLODIPine   Tablet 10 milliGRAM(s) Oral daily  artificial  tears Solution 2 Drop(s) Both EYES every 6 hours  brivaracetam 50 milliGRAM(s) Oral <User Schedule>  brivaracetam 50 milliGRAM(s) Oral <User Schedule>  carvedilol 12.5 milliGRAM(s) Oral every 12 hours  chlorhexidine 2% Cloths 1 Application(s) Topical <User Schedule>  dextrose 5%. 1000 milliLiter(s) (100 mL/Hr) IV Continuous <Continuous>  dextrose 5%. 1000 milliLiter(s) (50 mL/Hr) IV Continuous <Continuous>  dextrose 50% Injectable 25 Gram(s) IV Push once  dextrose 50% Injectable 12.5 Gram(s) IV Push once  dextrose 50% Injectable 25 Gram(s) IV Push once  doxazosin 4 milliGRAM(s) Oral <User Schedule>  epoetin cortney-epbx (RETACRIT) Injectable 59843 Unit(s) IV Push <User Schedule>  finasteride 5 milliGRAM(s) Oral daily  furosemide    Tablet 80 milliGRAM(s) Oral daily  glucagon  Injectable 1 milliGRAM(s) IntraMuscular once  insulin glargine Injectable (LANTUS) 5 Unit(s) SubCutaneous at bedtime  insulin lispro (ADMELOG) corrective regimen sliding scale   SubCutaneous three times a day before meals  insulin lispro (ADMELOG) corrective regimen sliding scale   SubCutaneous at bedtime  insulin lispro Injectable (ADMELOG) 5 Unit(s) SubCutaneous three times a day before meals  levothyroxine 50 MICROGram(s) Oral daily  multivitamin 1 Tablet(s) Oral daily  mycophenolate mofetil 500 milliGRAM(s) Oral <User Schedule>  nystatin    Suspension 428401 Unit(s) Oral four times a day  pantoprazole    Tablet 40 milliGRAM(s) Oral <User Schedule>  phenytoin   Capsule 200 milliGRAM(s) Oral two times a day  predniSONE   Tablet 10 milliGRAM(s) Oral daily  ramelteon 8 milliGRAM(s) Oral at bedtime  sevelamer carbonate 800 milliGRAM(s) Oral three times a day with meals  sirolimus 7 milliGRAM(s) Oral <User Schedule>  trimethoprim   80 mG/sulfamethoxazole 400 mG 1 Tablet(s) Oral daily  valGANciclovir 450 milliGRAM(s) Oral <User Schedule>  warfarin 5 milliGRAM(s) Oral once    MEDICATIONS  (PRN):  acetaminophen     Tablet .. 650 milliGRAM(s) Oral every 6 hours PRN Temp greater or equal to 38C (100.4F), Mild Pain (1 - 3)  dextrose Oral Gel 15 Gram(s) Oral once PRN Blood Glucose LESS THAN 70 milliGRAM(s)/deciliter  polyethylene glycol 3350 17 Gram(s) Oral daily PRN Constipation   SUBJECTIVE/ROS:  Patient seen and evaluated sitting up in regular WC during PT  Tolerating therapy - ambulated 50' with RW + WCF with better gait quality  Wide awake  Denies CP, palpitation, SOB, or cough  No HA, dizziness, or lightheadedness  no nausea or abd pain.  LBM    minimal to none urine output - on HD (last HD yesterday)     Vital Signs Last 24 Hrs  T(C): 36.8 (22 @ 08:06), Max: 36.8 (22 @ 08:06)  T(F): 98.3 (22 @ 08:06), Max: 98.3 (22 @ 08:06)  HR: 60 (22 @ 08:06) (54 - 65)  BP: 149/70 (22 @ 08:06) (147/63 - 156/46)  RR: 16 (22 @ 08:06) (16 - 16)  SpO2: 100% (22 @ 08:06) (98% - 100%) on room air    Daily -  Admission weight () 69.8 kg        Daily Weight in k.9 (26 Sep 2022 17:30)    Gen - NAD, comfortable on RA  HEENT - NCAT, EOMI, MMM  Neck - Supple, No limited ROM  Pulm - diminished to bilateral base  Cardiovascular - warm and well perfused  Chest - left chest wall permcath  Abdomen -  distended, but nontender, tympanic  Extremities - No Cyanosis, no clubbing, no edema, no calf tenderness  Neuro-     Cognitive - awake, alert, oriented to person and place. Delayed processing/response     Cranial Nerves - CN 2-12 intact. No facial asymmetry, Tongue midline, EOMI, Shoulder shrug intact     Motor -                     LEFT    UE - 3+5                    RIGHT UE - 3-/5                    LEFT    LE - KE 4/5                    RIGHT LE - KE 4/5        Sensory - Intact to LT bilaterally  MSK: generalized weakness  Skin:  stage 2 pressure ulcer to sacrum 0.3 x 0.2,  discoloration to lateral RLQ of abdomen    Therapy--engaging, energy levels improved  Still has executive fxn deficits  Improvement with concentration noted    RECENT LABS:           LAB                        7.8    5.91  )-----------( 284      ( 26 Sep 2022 14:30 )             25.1         130<L>  |  90<L>  |  112<H>  ----------------------------<  132<H>  4.2   |  25  |  7.10<H>    Ca    8.7      26 Sep 2022 14:30    TPro  7.0  /  Alb  2.0<L>  /  TBili  0.4  /  DBili  x   /  AST  20  /  ALT  28  /  AlkPhos  626<H>      LIVER FUNCTIONS - ( 26 Sep 2022 14:30 )  Alb: 2.0 g/dL / Pro: 7.0 g/dL / ALK PHOS: 626 U/L / ALT: 28 U/L / AST: 20 U/L / GGT: x           PT/INR - ( 27 Sep 2022 05:30 )   PT: 27.5 sec;   INR: 2.35 ratio      CAPILLARY BLOOD GLUCOSE  POCT Blood Glucose.: 178 mg/dL (27 Sep 2022 08:02)  POCT Blood Glucose.: 129 mg/dL (26 Sep 2022 21:37)  POCT Blood Glucose.: 103 mg/dL (26 Sep 2022 17:34)  POCT Blood Glucose.: 150 mg/dL (26 Sep 2022 11:53)        Xray Chest 1 View- PORTABLE-Routine (Xray Chest 1 View- PORTABLE-Routine .) (22 @ 12:04) >  Interval exchange of the dialysis catheter. No evidence of pneumothorax.   No other significant change.    Allergies  No Known Allergies    MEDICATIONS  (STANDING):  amLODIPine   Tablet 10 milliGRAM(s) Oral daily  artificial  tears Solution 2 Drop(s) Both EYES every 6 hours  brivaracetam 50 milliGRAM(s) Oral <User Schedule>  brivaracetam 50 milliGRAM(s) Oral <User Schedule>  carvedilol 12.5 milliGRAM(s) Oral every 12 hours  chlorhexidine 2% Cloths 1 Application(s) Topical <User Schedule>  dextrose 5%. 1000 milliLiter(s) (100 mL/Hr) IV Continuous <Continuous>  dextrose 5%. 1000 milliLiter(s) (50 mL/Hr) IV Continuous <Continuous>  dextrose 50% Injectable 25 Gram(s) IV Push once  dextrose 50% Injectable 12.5 Gram(s) IV Push once  dextrose 50% Injectable 25 Gram(s) IV Push once  doxazosin 4 milliGRAM(s) Oral <User Schedule>  epoetin cortney-epbx (RETACRIT) Injectable 99391 Unit(s) IV Push <User Schedule>  finasteride 5 milliGRAM(s) Oral daily  furosemide    Tablet 80 milliGRAM(s) Oral daily  glucagon  Injectable 1 milliGRAM(s) IntraMuscular once  insulin glargine Injectable (LANTUS) 5 Unit(s) SubCutaneous at bedtime  insulin lispro (ADMELOG) corrective regimen sliding scale   SubCutaneous three times a day before meals  insulin lispro (ADMELOG) corrective regimen sliding scale   SubCutaneous at bedtime  insulin lispro Injectable (ADMELOG) 5 Unit(s) SubCutaneous three times a day before meals  levothyroxine 50 MICROGram(s) Oral daily  multivitamin 1 Tablet(s) Oral daily  mycophenolate mofetil 500 milliGRAM(s) Oral <User Schedule>  nystatin    Suspension 882029 Unit(s) Oral four times a day  pantoprazole    Tablet 40 milliGRAM(s) Oral <User Schedule>  phenytoin   Capsule 200 milliGRAM(s) Oral two times a day  predniSONE   Tablet 10 milliGRAM(s) Oral daily  ramelteon 8 milliGRAM(s) Oral at bedtime  sevelamer carbonate 800 milliGRAM(s) Oral three times a day with meals  sirolimus 7 milliGRAM(s) Oral <User Schedule>  trimethoprim   80 mG/sulfamethoxazole 400 mG 1 Tablet(s) Oral daily  valGANciclovir 450 milliGRAM(s) Oral <User Schedule>  warfarin 5 milliGRAM(s) Oral once    MEDICATIONS  (PRN):  acetaminophen     Tablet .. 650 milliGRAM(s) Oral every 6 hours PRN Temp greater or equal to 38C (100.4F), Mild Pain (1 - 3)  dextrose Oral Gel 15 Gram(s) Oral once PRN Blood Glucose LESS THAN 70 milliGRAM(s)/deciliter  polyethylene glycol 3350 17 Gram(s) Oral daily PRN Constipation

## 2022-09-27 NOTE — PROGRESS NOTE ADULT - SUBJECTIVE AND OBJECTIVE BOX
No distress    Vital Signs Last 24 Hrs  T(C): 36.8 (09-27-22 @ 08:06), Max: 36.8 (09-27-22 @ 08:06)  T(F): 98.3 (09-27-22 @ 08:06), Max: 98.3 (09-27-22 @ 08:06)  HR: 60 (09-27-22 @ 08:06) (60 - 65)  BP: 149/70 (09-27-22 @ 08:06) (147/63 - 156/46)  RR: 16 (09-27-22 @ 08:06) (16 - 16)  SpO2: 100% (09-27-22 @ 08:06) (98% - 100%)    s1s2  b/l air entry  soft, ND  no edema                                                                       7.8    5.91  )-----------( 284      ( 26 Sep 2022 14:30 )             25.1     26 Sep 2022 14:30    130    |  90     |  112    ----------------------------<  132    4.2     |  25     |  7.10     Ca    8.7        26 Sep 2022 14:30    TPro  7.0    /  Alb  2.0    /  TBili  0.4    /  DBili  x      /  AST  20     /  ALT  28     /  AlkPhos  626    26 Sep 2022 14:30    LIVER FUNCTIONS - ( 26 Sep 2022 14:30 )  Alb: 2.0 g/dL / Pro: 7.0 g/dL / ALK PHOS: 626 U/L / ALT: 28 U/L / AST: 20 U/L / GGT: x           PT/INR - ( 27 Sep 2022 05:30 )   PT: 27.5 sec;   INR: 2.35 ratio      acetaminophen     Tablet .. 650 milliGRAM(s) Oral every 6 hours PRN  amLODIPine   Tablet 10 milliGRAM(s) Oral daily  artificial  tears Solution 2 Drop(s) Both EYES every 6 hours  brivaracetam 50 milliGRAM(s) Oral <User Schedule>  brivaracetam 50 milliGRAM(s) Oral <User Schedule>  carvedilol 12.5 milliGRAM(s) Oral every 12 hours  chlorhexidine 2% Cloths 1 Application(s) Topical <User Schedule>  dextrose 5%. 1000 milliLiter(s) IV Continuous <Continuous>  dextrose 5%. 1000 milliLiter(s) IV Continuous <Continuous>  dextrose 50% Injectable 25 Gram(s) IV Push once  dextrose 50% Injectable 12.5 Gram(s) IV Push once  dextrose 50% Injectable 25 Gram(s) IV Push once  dextrose Oral Gel 15 Gram(s) Oral once PRN  doxazosin 4 milliGRAM(s) Oral <User Schedule>  epoetin cortney-epbx (RETACRIT) Injectable 59348 Unit(s) IV Push <User Schedule>  finasteride 5 milliGRAM(s) Oral daily  furosemide    Tablet 80 milliGRAM(s) Oral daily  glucagon  Injectable 1 milliGRAM(s) IntraMuscular once  insulin glargine Injectable (LANTUS) 5 Unit(s) SubCutaneous at bedtime  insulin lispro (ADMELOG) corrective regimen sliding scale   SubCutaneous three times a day before meals  insulin lispro (ADMELOG) corrective regimen sliding scale   SubCutaneous at bedtime  insulin lispro Injectable (ADMELOG) 5 Unit(s) SubCutaneous three times a day before meals  levothyroxine 50 MICROGram(s) Oral daily  multivitamin 1 Tablet(s) Oral daily  mycophenolate mofetil 500 milliGRAM(s) Oral <User Schedule>  nystatin    Suspension 423192 Unit(s) Oral four times a day  pantoprazole    Tablet 40 milliGRAM(s) Oral <User Schedule>  phenytoin   Capsule 200 milliGRAM(s) Oral two times a day  polyethylene glycol 3350 17 Gram(s) Oral daily PRN  predniSONE   Tablet 10 milliGRAM(s) Oral daily  ramelteon 8 milliGRAM(s) Oral at bedtime  sevelamer carbonate 800 milliGRAM(s) Oral three times a day with meals  sirolimus 7 milliGRAM(s) Oral <User Schedule>  trimethoprim   80 mG/sulfamethoxazole 400 mG 1 Tablet(s) Oral daily  valGANciclovir 450 milliGRAM(s) Oral <User Schedule>  warfarin 5 milliGRAM(s) Oral once    A/P:    S/p complicated hospital course as per HPI  Now in [place]  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

## 2022-09-28 NOTE — PROGRESS NOTE ADULT - ASSESSMENT
66 yr old Male with PMHx of DM type II, HTN, CAD s/p stents/ CABG (2020), AFib on Eliquis, CVA (2019) d/t Afib, Seizure d/o following CVA, last episode was on 4/8/22, h/o GIB (2/2022) EGD with duodenal ulcer.  ESRD due to DM was on HD since 2019, s/p premacath removal 5/10/22 s/p Right DDRT (4/21/22) and placed on belatacept.  Frequent hospitalization d/t weakness, refractory sz, and sepsis.  Admitted and intubated 6/10, found to large pleural effusion s/p thoracentesis ~1.3 and VATS of thoracic 2.2 L.      TRANSPLANT PATIENT -  * INR 2.11 (9/28) - coumadin 6mg for today  * transplant team PHONE  --Iker Alfred (MD) ??  Dr. Eduardo Barber (transplant surgeon) - Darlene Dior NP [phone]*  * Barby MUNOZ - recommendations for labs: BK virus DNA (negative 9/22), lactate (367 9/15-> 340 9/22 ), mag (2.0 -> 1.9), parathyroid (202-> 178), phosphorus (6.9 9/19 -> 3.7), CRP (230 -> 190), uric acid (3.2 -> 3.2), and UA (oliguria).  CMV pcr (negative 9/22)  *Barby MUNOZ emailed in regards to lab monitoring and to review discharge medications ---- awaiting for response  * HD M/W/F - need additional brivaracetam after HD  * Recommend home O2 because he is vulnerable to oxygen desaturation by virtue of his renal disease and debility  * repeat CXR prior to discharge - will order today 9/28    COMORBIDITES/ACTIVE MEDICAL ISSUES   Gait Instability, ADL impairments and Functional impairments secondary to recurrent UTI, status epilepticus, with debility seizures. 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  Belatacept d/t 8/1 and was started on Sirolimus 7mg QD  - Prednisone 10  - Cellcept 250 BID on hold  - S/p perma cath placement 8/30/2022  - PPx medications: Nystatin, Bactrim, Valcyte (MWF)  - Nephrology following - modified to [facility]  - Strongyloides Antibodies positive - Ivermectin x1 dose (9/21)    #Anemia of chronic disease  - Has gotten total of 6U PRBC and 2 U FFP during acute hospital   - monitor H/H 8.0 (9/12) - 8.4 (9/23) -> 7.8  (9/26) -> repeat today    #HTN  #Afib  - Eliquis switched to Coumadin  - Coreg   - Daily INR til therapeutic on a consistent coumadin dose    #Diabetes  - A1C  - Lantus  - standing Admelog dc, restarted 9/16 @ 3U with meals  - FS + ISS  - Hospitalist following   -  - 176 (9/28) stable    #Hypothyroidism  - Synthroid 37.5 mcg    #Pain control  - Tylenol PRN    #GI/Bowel Mgmt   - At risk for constipation due to neurologic diagnosis, immobility and/or medication use  - Senna d/c d/t loose BM 9/11  -  Miralax PRN    #Bladder management  - incontinence     #DVT   - acute DVT to R IJ thrombosis, Superficial vein thrombus to right basilic and cephalic vein  - Coumadin  - SCDs, TEDs     #Skin:  -Stage 2 pressure ulcer to sacrum 0.3 x 0.2- cleanse with NS, apply wet gauze andcover with allevyn foam dressing  -At risk for pressure injury due to neurologic diagnosis and relative immobility  -Bilateral heels - soft heel protectors, apply liquid barrier film daily and monitor for tissue type changes  - Turn Q2 hr while in bed, air mattress  - Skin barrier cream as needed  - Nursing to monitor skin Qshift    Diet  - Regular/easy to chew + Thins  [[facility], renal diet]    + Fluid Restriction: 1L    Precautions / PROPHYLAXIS:   - Falls, Cardiac, Seizure   - ortho: Weight bearing status: WBAT   - Lungs: Aspiration, Incentive Spirometer   - Pressure injury/Skin: Turn Q2hrs while in bed, OOB to Chair, PT/OT      liaison with family 9/12--Spoke with son Dr Perez  He gave update on patient's clinical state, investigation results and function prior to acute rehab treatment   I gave update on patients current status, and he was happy with same  Plan to to give interval updates to family as needed    9/22/22--I discussed with his second son (an RN) at patient's bed side  He reports family's plan for home dc, arrangements made to care for patient--his wife is always at home, and sons make time available    IDT 9/26   lives with spouse and 2 sons in  with 6 [facility], [facility] setup.  PTA needed some assistance with ADLs  Functional status: mod A x 1 with stand piovt with and wo RW (needs cutes), mod A to initiate task.  Eating with incidental Assist.  Transfers: sit->stand min A, but mod A + cues with stand to sit, mod A to WC. Ambulated 50' RW min + WCF  Goal: min A/needs hands on A, mod A shower transfer  SLP: discharge as per family   TDD: 9/29 Home need 24/7 assistance.  Family putting in ramp on Wednesday (9/28)    Follow ups:  Andre Patterson)  Clinical Neurophysiology; EEGEpilepsy; Neurology  [address], [address]  [place] [address]  Phone: [phone]  Fax: [phone]  Follow Up Time:     Padmini Lincoln ()  Internal Medicine  [address], [address]  [place] [address]  Phone: [phone]  Fax: [phone]  Follow Up Time:     Iker Alfred)  Internal Medicine; Nephrology  [address] [place] [address]  Phone: [phone]  Fax: [phone]  Follow Up Time:    Dr. Eduardo Barber - Organ Transplant    Iker Alfred  [address]  Scheduled Appointment: 09/20/2022    Iker Alfred  [address]  Scheduled Appointment: 10/20/2022     66 yr old Male with PMHx of DM type II, HTN, CAD s/p stents/ CABG (2020), AFib on Eliquis, CVA (2019) d/t Afib, Seizure d/o following CVA, last episode was on 4/8/22, h/o GIB (2/2022) EGD with duodenal ulcer.  ESRD due to DM was on HD since 2019, s/p premacath removal 5/10/22 s/p Right DDRT (4/21/22) and placed on belatacept.  Frequent hospitalization d/t weakness, refractory sz, and sepsis.  Admitted and intubated 6/10, found to large pleural effusion s/p thoracentesis ~1.3 and VATS of thoracic 2.2 L.      TRANSPLANT PATIENT -  * INR 2.11 (9/28) - coumadin 6mg for today  * transplant team PHONE  --Iker Alfred (MD) ??  Dr. Eduardo Barber (transplant surgeon) - Darleen Dior NP [phone]*  * Barby MUNOZ - recommendations for labs: BK virus DNA (negative 9/22), lactate (367 9/15-> 340 9/22 ), mag (2.0 -> 1.9), parathyroid (202-> 178), phosphorus (6.9 9/19 -> 3.7), CRP (230 -> 190), uric acid (3.2 -> 3.2), and UA (oliguria).  CMV pcr (negative 9/22)  *Barby MUNOZ emailed in regards to lab monitoring and to review discharge medications ---- awaiting for response  * HD M/W/F - need additional brivaracetam after HD  * Recommend home O2 because he is vulnerable to oxygen desaturation by virtue of his renal disease and debility  * repeat CXR prior to discharge - will order today 9/28  * Family working with transplant team for dc planning     COMORBIDITES/ACTIVE MEDICAL ISSUES   Gait Instability, ADL impairments and Functional impairments secondary to recurrent UTI, status epilepticus, with debility seizures. 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  Belatacept d/t 8/1 and was started on Sirolimus 7mg QD  - Prednisone 10  - Cellcept 250 BID on hold  - S/p perma cath placement 8/30/2022  - PPx medications: Nystatin, Bactrim, Valcyte (MWF)  - Nephrology following - modified to [facility]  - Strongyloides Antibodies positive - Ivermectin x1 dose (9/21)    #Anemia of chronic disease  - Has gotten total of 6U PRBC and 2 U FFP during acute hospital   - monitor H/H 8.0 (9/12) - 8.4 (9/23) -> 7.8  (9/26) -> repeat today    #HTN  #Afib  - Eliquis switched to Coumadin  - Coreg   - Daily INR til therapeutic on a consistent coumadin dose    #Diabetes  - A1C  - Lantus  - standing Admelog dc, restarted 9/16 @ 3U with meals  - FS + ISS  - Hospitalist following   -  - 176 (9/28) stable    #Hypothyroidism  - Synthroid 37.5 mcg    #Pain control  - Tylenol PRN    #GI/Bowel Mgmt   - At risk for constipation due to neurologic diagnosis, immobility and/or medication use  - Senna d/c d/t loose BM 9/11  -  Miralax PRN    #Bladder management  - incontinence     #DVT   - acute DVT to R IJ thrombosis, Superficial vein thrombus to right basilic and cephalic vein  - Coumadin  - SCDs, TEDs     #Skin:  -Stage 2 pressure ulcer to sacrum 0.3 x 0.2- cleanse with NS, apply wet gauze andcover with allevyn foam dressing  -At risk for pressure injury due to neurologic diagnosis and relative immobility  -Bilateral heels - soft heel protectors, apply liquid barrier film daily and monitor for tissue type changes  - Turn Q2 hr while in bed, air mattress  - Skin barrier cream as needed  - Nursing to monitor skin Qshift    Diet  - Regular/easy to chew + Thins  [[facility], renal diet]    + Fluid Restriction: 1L    Precautions / PROPHYLAXIS:   - Falls, Cardiac, Seizure   - ortho: Weight bearing status: WBAT   - Lungs: Aspiration, Incentive Spirometer   - Pressure injury/Skin: Turn Q2hrs while in bed, OOB to Chair, PT/OT      liaison with family 9/12--Spoke with son  Chris  He gave update on patient's clinical state, investigation results and function prior to acute rehab treatment   I gave update on patients current status, and he was happy with same  Plan to to give interval updates to family as needed    9/22/22--I discussed with his second son (an RN) at patient's bed side  He reports family's plan for home dc, arrangements made to care for patient--his wife is always at home, and sons make time available    IDT 9/26   lives with spouse and 2 sons in [place] with 6 [email or web address] setup.  PTA needed some assistance with ADLs  Functional status: mod A x 1 with stand piovt with and wo RW (needs cutes), mod A to initiate task.  Eating with incidental Assist.  Transfers: sit->stand min A, but mod A + cues with stand to sit, mod A to WC. Ambulated 50' RW min + WCF  Goal: min A/needs hands on A, mod A shower transfer  SLP: discharge as per family   TDD: 9/29 Home need 24/7 assistance.  Family putting in ramp on Wednesday (9/28)    Follow ups:  Andre Patterson)  Clinical Neurophysiology; EEGEpilepsy; Neurology  [address], [address]  [place] [address]  Phone: [phone]  Fax: [phone]  Follow Up Time:     Padmini Lincoln ()  Internal Medicine  [address], [address]  [place] [address]  Phone: [phone]  Fax: [phone]  Follow Up Time:     Iker Alfred)  Internal Medicine; Nephrology  [address] [place] [address]  Phone: [phone]  Fax: [phone]  Follow Up Time:    Dr. Eduardo Barber - Organ Transplant    Iker Alfred  [address]  Scheduled Appointment: 09/20/2022    Iker Alfred  [address]  Scheduled Appointment: 10/20/2022

## 2022-09-28 NOTE — PROGRESS NOTE ADULT - SUBJECTIVE AND OBJECTIVE BOX
SUBJECTIVE/ROS:  Patient seen and evaluated sitting up in WC at bedside  didn't sleep well d/t frightening dreams  report coughing more than before - will have CXR after HD today  Denies CP, palpitation, SOB, +cough  No HA, dizziness, or lightheadedness  no nausea or abd pain.  LBM   minimal to none urine output - on HD (HD today)    Vital Signs Last 24 Hrs  T(C): 36.8 (28 Sep 2022 07:09), Max: 36.8 (28 Sep 2022 07:09)  T(F): 98.3 (28 Sep 2022 07:09), Max: 98.3 (28 Sep 2022 07:09)  HR: 64 (28 Sep 2022 07:09) (62 - 68)  BP: 154/67 (28 Sep 2022 07:09) (141/63 - 157/68)  RR: 16 (28 Sep 2022 07:09) (16 - 16)  SpO2: 97% (28 Sep 2022 07:09) (97% - 99%)on room air    Daily -  Admission weight () 69.8 kg        Daily Weight in k (28 Sep 2022 06:04)    Gen - NAD, comfortable on RA  HEENT - NCAT, EOMI, MMM  Neck - Supple, No limited ROM  Pulm - diminished to bilateral base  Cardiovascular - warm and well perfused  Chest - left chest wall permcath  Abdomen -  distended, but nontender, tympanic  Extremities - No Cyanosis, no clubbing, no edema, no calf tenderness  Neuro-     Cognitive - awake, alert, oriented to person and place. Delayed processing/response     Cranial Nerves - CN 2-12 intact. No facial asymmetry, Tongue midline, EOMI, Shoulder shrug intact     Motor -                     LEFT    UE - 3+5                    RIGHT UE - 3-/5                    LEFT    LE - KE 4/5                    RIGHT LE - KE 4/5        Sensory - Intact to LT bilaterally  MSK: generalized weakness  Skin:  stage 2 pressure ulcer to sacrum 0.3 x 0.2,  discoloration to lateral RLQ of abdomen    RECENT LABS:                               7.8    5.91  )-----------( 284      ( 26 Sep 2022 14:30 )             25.1     09-26    130<L>  |  90<L>  |  112<H>  ----------------------------<  132<H>  4.2   |  25  |  7.10<H>    Ca    8.7      26 Sep 2022 14:30    TPro  7.0  /  Alb  2.0<L>  /  TBili  0.4  /  DBili  x   /  AST  20  /  ALT  28  /  AlkPhos  626<H>  09-26    LIVER FUNCTIONS - ( 26 Sep 2022 14:30 )  Alb: 2.0 g/dL / Pro: 7.0 g/dL / ALK PHOS: 626 U/L / ALT: 28 U/L / AST: 20 U/L / GGT: x           PT/INR - ( 28 Sep 2022 06:40 )   PT: 24.7 sec;   INR: 2.11 ratio      CAPILLARY BLOOD GLUCOSE  POCT Blood Glucose.: 138 mg/dL (28 Sep 2022 07:35)  POCT Blood Glucose.: 133 mg/dL (27 Sep 2022 21:58)  POCT Blood Glucose.: 176 mg/dL (27 Sep 2022 17:07)  POCT Blood Glucose.: 185 mg/dL (27 Sep 2022 12:02)    Xray Chest 1 View- PORTABLE-Routine (Xray Chest 1 View- PORTABLE-Routine .) (22 @ 12:04) >  Interval exchange of the dialysis catheter. No evidence of pneumothorax.   No other significant change.    Allergies  No Known Allergies    MEDICATIONS  (STANDING):  amLODIPine   Tablet 10 milliGRAM(s) Oral daily  artificial  tears Solution 2 Drop(s) Both EYES every 6 hours  brivaracetam 50 milliGRAM(s) Oral <User Schedule>  brivaracetam 50 milliGRAM(s) Oral <User Schedule>  carvedilol 12.5 milliGRAM(s) Oral every 12 hours  chlorhexidine 2% Cloths 1 Application(s) Topical <User Schedule>  dextrose 5%. 1000 milliLiter(s) (100 mL/Hr) IV Continuous <Continuous>  dextrose 5%. 1000 milliLiter(s) (50 mL/Hr) IV Continuous <Continuous>  dextrose 50% Injectable 25 Gram(s) IV Push once  dextrose 50% Injectable 12.5 Gram(s) IV Push once  dextrose 50% Injectable 25 Gram(s) IV Push once  doxazosin 4 milliGRAM(s) Oral <User Schedule>  epoetin cortney-epbx (RETACRIT) Injectable 62108 Unit(s) IV Push <User Schedule>  finasteride 5 milliGRAM(s) Oral daily  furosemide    Tablet 80 milliGRAM(s) Oral daily  glucagon  Injectable 1 milliGRAM(s) IntraMuscular once  insulin glargine Injectable (LANTUS) 5 Unit(s) SubCutaneous at bedtime  insulin lispro (ADMELOG) corrective regimen sliding scale   SubCutaneous three times a day before meals  insulin lispro (ADMELOG) corrective regimen sliding scale   SubCutaneous at bedtime  insulin lispro Injectable (ADMELOG) 5 Unit(s) SubCutaneous three times a day before meals  levothyroxine 50 MICROGram(s) Oral daily  multivitamin 1 Tablet(s) Oral daily  mycophenolate mofetil 500 milliGRAM(s) Oral <User Schedule>  nystatin    Suspension 723709 Unit(s) Oral four times a day  pantoprazole    Tablet 40 milliGRAM(s) Oral <User Schedule>  phenytoin   Capsule 200 milliGRAM(s) Oral two times a day  predniSONE   Tablet 10 milliGRAM(s) Oral daily  ramelteon 8 milliGRAM(s) Oral at bedtime  sevelamer carbonate 800 milliGRAM(s) Oral three times a day with meals  sirolimus 7 milliGRAM(s) Oral <User Schedule>  trimethoprim   80 mG/sulfamethoxazole 400 mG 1 Tablet(s) Oral daily  valGANciclovir 450 milliGRAM(s) Oral <User Schedule>    MEDICATIONS  (PRN):  acetaminophen     Tablet .. 650 milliGRAM(s) Oral every 6 hours PRN Temp greater or equal to 38C (100.4F), Mild Pain (1 - 3)  dextrose Oral Gel 15 Gram(s) Oral once PRN Blood Glucose LESS THAN 70 milliGRAM(s)/deciliter  polyethylene glycol 3350 17 Gram(s) Oral daily PRN Constipation   SUBJECTIVE/ROS:  Patient seen and evaluated sitting up in WC at bedside  didn't sleep well d/t frightening dreams  report coughing more than before - will have CXR after HD today  Denies CP, palpitation, SOB, +cough  No HA, dizziness, or lightheadedness  no nausea or abd pain.  LBM   minimal to none urine output - on HD (HD today)    Vital Signs Last 24 Hrs  T(C): 36.8 (28 Sep 2022 07:09), Max: 36.8 (28 Sep 2022 07:09)  T(F): 98.3 (28 Sep 2022 07:09), Max: 98.3 (28 Sep 2022 07:09)  HR: 64 (28 Sep 2022 07:09) (62 - 68)  BP: 154/67 (28 Sep 2022 07:09) (141/63 - 157/68)  RR: 16 (28 Sep 2022 07:09) (16 - 16)  SpO2: 97% (28 Sep 2022 07:09) (97% - 99%)on room air    Therapy--engaging,  working on endurance   Daily -  Admission weight () 69.8 kg        Daily Weight in k (28 Sep 2022 06:04)    Gen - NAD, comfortable on RA  HEENT - NCAT, EOMI, MMM  Neck - Supple, No limited ROM  Pulm - diminished to bilateral base  Cardiovascular - warm and well perfused  Chest - left chest wall permcath  Abdomen -  distended, but nontender, tympanic  Extremities - No Cyanosis, no clubbing, no edema, no calf tenderness  Neuro-     Cognitive - awake, alert, oriented to person and place. Delayed processing/response     Cranial Nerves - CN 2-12 intact. No facial asymmetry, Tongue midline, EOMI, Shoulder shrug intact     Motor -                     LEFT    UE - 3+5                    RIGHT UE - 3-/5                    LEFT    LE - KE 4/5                    RIGHT LE - KE 4/5        Sensory - Intact to LT bilaterally  MSK: generalized weakness  Skin:  stage 2 pressure ulcer to sacrum 0.3 x 0.2,  discoloration to lateral RLQ of abdomen    RECENT LABS:                               7.8    5.91  )-----------( 284      ( 26 Sep 2022 14:30 )             25.1     09-26    130<L>  |  90<L>  |  112<H>  ----------------------------<  132<H>  4.2   |  25  |  7.10<H>    Ca    8.7      26 Sep 2022 14:30    TPro  7.0  /  Alb  2.0<L>  /  TBili  0.4  /  DBili  x   /  AST  20  /  ALT  28  /  AlkPhos  626<H>  09-26    LIVER FUNCTIONS - ( 26 Sep 2022 14:30 )  Alb: 2.0 g/dL / Pro: 7.0 g/dL / ALK PHOS: 626 U/L / ALT: 28 U/L / AST: 20 U/L / GGT: x           PT/INR - ( 28 Sep 2022 06:40 )   PT: 24.7 sec;   INR: 2.11 ratio      CAPILLARY BLOOD GLUCOSE  POCT Blood Glucose.: 138 mg/dL (28 Sep 2022 07:35)  POCT Blood Glucose.: 133 mg/dL (27 Sep 2022 21:58)  POCT Blood Glucose.: 176 mg/dL (27 Sep 2022 17:07)  POCT Blood Glucose.: 185 mg/dL (27 Sep 2022 12:02)    Xray Chest 1 View- PORTABLE-Routine (Xray Chest 1 View- PORTABLE-Routine .) (22 @ 12:04) >  Interval exchange of the dialysis catheter. No evidence of pneumothorax.   No other significant change.    Allergies  No Known Allergies    MEDICATIONS  (STANDING):  amLODIPine   Tablet 10 milliGRAM(s) Oral daily  artificial  tears Solution 2 Drop(s) Both EYES every 6 hours  brivaracetam 50 milliGRAM(s) Oral <User Schedule>  brivaracetam 50 milliGRAM(s) Oral <User Schedule>  carvedilol 12.5 milliGRAM(s) Oral every 12 hours  chlorhexidine 2% Cloths 1 Application(s) Topical <User Schedule>  dextrose 5%. 1000 milliLiter(s) (100 mL/Hr) IV Continuous <Continuous>  dextrose 5%. 1000 milliLiter(s) (50 mL/Hr) IV Continuous <Continuous>  dextrose 50% Injectable 25 Gram(s) IV Push once  dextrose 50% Injectable 12.5 Gram(s) IV Push once  dextrose 50% Injectable 25 Gram(s) IV Push once  doxazosin 4 milliGRAM(s) Oral <User Schedule>  epoetin cortney-epbx (RETACRIT) Injectable 66113 Unit(s) IV Push <User Schedule>  finasteride 5 milliGRAM(s) Oral daily  furosemide    Tablet 80 milliGRAM(s) Oral daily  glucagon  Injectable 1 milliGRAM(s) IntraMuscular once  insulin glargine Injectable (LANTUS) 5 Unit(s) SubCutaneous at bedtime  insulin lispro (ADMELOG) corrective regimen sliding scale   SubCutaneous three times a day before meals  insulin lispro (ADMELOG) corrective regimen sliding scale   SubCutaneous at bedtime  insulin lispro Injectable (ADMELOG) 5 Unit(s) SubCutaneous three times a day before meals  levothyroxine 50 MICROGram(s) Oral daily  multivitamin 1 Tablet(s) Oral daily  mycophenolate mofetil 500 milliGRAM(s) Oral <User Schedule>  nystatin    Suspension 501507 Unit(s) Oral four times a day  pantoprazole    Tablet 40 milliGRAM(s) Oral <User Schedule>  phenytoin   Capsule 200 milliGRAM(s) Oral two times a day  predniSONE   Tablet 10 milliGRAM(s) Oral daily  ramelteon 8 milliGRAM(s) Oral at bedtime  sevelamer carbonate 800 milliGRAM(s) Oral three times a day with meals  sirolimus 7 milliGRAM(s) Oral <User Schedule>  trimethoprim   80 mG/sulfamethoxazole 400 mG 1 Tablet(s) Oral daily  valGANciclovir 450 milliGRAM(s) Oral <User Schedule>    MEDICATIONS  (PRN):  acetaminophen     Tablet .. 650 milliGRAM(s) Oral every 6 hours PRN Temp greater or equal to 38C (100.4F), Mild Pain (1 - 3)  dextrose Oral Gel 15 Gram(s) Oral once PRN Blood Glucose LESS THAN 70 milliGRAM(s)/deciliter  polyethylene glycol 3350 17 Gram(s) Oral daily PRN Constipation

## 2022-09-28 NOTE — PROGRESS NOTE ADULT - SUBJECTIVE AND OBJECTIVE BOX
Stable on HD    Vital Signs Last 24 Hrs  T(C): 36.7 (09-28-22 @ 20:17), Max: 36.8 (09-28-22 @ 07:09)  T(F): 98.1 (09-28-22 @ 20:17), Max: 98.3 (09-28-22 @ 07:09)  HR: 68 (09-28-22 @ 20:17) (59 - 68)  BP: 153/66 (09-28-22 @ 20:17) (144/66 - 157/68)  RR: 16 (09-28-22 @ 20:17) (16 - 16)  SpO2: 97% (09-28-22 @ 20:17) (97% - 100%)    s1s2  b/l air entry  soft, ND  no edema                                                                               7.4    6.16  )-----------( 290      ( 28 Sep 2022 14:45 )             23.6     28 Sep 2022 14:45    130    |  91     |  96     ----------------------------<  124    4.5     |  26     |  6.39     Ca    8.6        28 Sep 2022 14:45    TPro  6.6    /  Alb  1.9    /  TBili  0.3    /  DBili  x      /  AST  28     /  ALT  32     /  AlkPhos  669    28 Sep 2022 14:45    LIVER FUNCTIONS - ( 28 Sep 2022 14:45 )  Alb: 1.9 g/dL / Pro: 6.6 g/dL / ALK PHOS: 669 U/L / ALT: 32 U/L / AST: 28 U/L / GGT: x           PT/INR - ( 28 Sep 2022 06:40 )   PT: 24.7 sec;   INR: 2.11 ratio      acetaminophen     Tablet .. 650 milliGRAM(s) Oral every 6 hours PRN  amLODIPine   Tablet 10 milliGRAM(s) Oral daily  artificial  tears Solution 2 Drop(s) Both EYES every 6 hours  brivaracetam 50 milliGRAM(s) Oral <User Schedule>  brivaracetam 50 milliGRAM(s) Oral <User Schedule>  carvedilol 12.5 milliGRAM(s) Oral every 12 hours  chlorhexidine 2% Cloths 1 Application(s) Topical <User Schedule>  dextrose 5%. 1000 milliLiter(s) IV Continuous <Continuous>  dextrose 5%. 1000 milliLiter(s) IV Continuous <Continuous>  dextrose 50% Injectable 12.5 Gram(s) IV Push once  dextrose 50% Injectable 25 Gram(s) IV Push once  dextrose 50% Injectable 25 Gram(s) IV Push once  dextrose Oral Gel 15 Gram(s) Oral once PRN  doxazosin 4 milliGRAM(s) Oral <User Schedule>  epoetin cortney-epbx (RETACRIT) Injectable 70693 Unit(s) IV Push <User Schedule>  finasteride 5 milliGRAM(s) Oral daily  furosemide    Tablet 80 milliGRAM(s) Oral daily  glucagon  Injectable 1 milliGRAM(s) IntraMuscular once  insulin glargine Injectable (LANTUS) 5 Unit(s) SubCutaneous at bedtime  insulin lispro (ADMELOG) corrective regimen sliding scale   SubCutaneous three times a day before meals  insulin lispro (ADMELOG) corrective regimen sliding scale   SubCutaneous at bedtime  insulin lispro Injectable (ADMELOG) 5 Unit(s) SubCutaneous three times a day before meals  levothyroxine 50 MICROGram(s) Oral daily  multivitamin 1 Tablet(s) Oral daily  mycophenolate mofetil 500 milliGRAM(s) Oral <User Schedule>  nystatin    Suspension 398027 Unit(s) Oral four times a day  pantoprazole    Tablet 40 milliGRAM(s) Oral <User Schedule>  phenytoin   Capsule 200 milliGRAM(s) Oral two times a day  polyethylene glycol 3350 17 Gram(s) Oral daily PRN  predniSONE   Tablet 10 milliGRAM(s) Oral daily  ramelteon 8 milliGRAM(s) Oral at bedtime  sevelamer carbonate 800 milliGRAM(s) Oral three times a day with meals  sirolimus 7 milliGRAM(s) Oral <User Schedule>  trimethoprim   80 mG/sulfamethoxazole 400 mG 1 Tablet(s) Oral daily  valGANciclovir 450 milliGRAM(s) Oral <User Schedule>    A/P:    S/p complicated hospital course as per HPI  S/p DDRT 4/21/22, required HD, came off HD since 5/5/22, back on HD since 7/27/22  Continue transplant meds per transplant team recommendations  Avoid nephrotoxins  HD MWF as ordered  Epoetin w/HD 3 x week  TMP as able  Renal diet  Bld work w/each HD   Rehab    [phone]

## 2022-09-29 NOTE — PROGRESS NOTE ADULT - SUBJECTIVE AND OBJECTIVE BOX
SUBJECTIVE/ROS:  Patient seen and evaluated at bedside - sitting up in WC  slept overnight  energy level feels low today - informed that his hgb was low as of yesterday - will be repeating CBC, and ordered for FOBT  Cough fine today - pending CXR   Denies CP, palpitation, SOB, +intermittent cough  No HA, dizziness, or lightheadedness  no nausea or abd pain.  LBM   minimal to none urine output - on HD (HD yesterday)    Vital Signs Last 24 Hrs  T(C): 36.7 (22 @ 20:17), Max: 36.7 (22 @ 17:45)  T(F): 98.1 (22 @ 20:17), Max: 98.1 (22 @ 20:17)  HR: 65 (22 @ 05:21) (59 - 68)  BP: 149/72 (22 @ 05:21) (144/66 - 153/66)  RR: 16 (22 @ 05:21) (16 - 16)  SpO2: 97% (22 @ 05:21) (97% - 100%) on room air     Daily -  Admission weight () 69.8 kg        Daily Weight in k.2 (29 Sep 2022 05:24)    Gen - NAD, comfortable on RA  HEENT - NCAT, EOMI, MMM  Neck - Supple, No limited ROM  Pulm - diminished to bilateral base  Cardiovascular - warm and well perfused  Chest - left chest wall permcath  Abdomen -  distended, but nontender, tympanic  Extremities - No Cyanosis, no clubbing, no edema, no calf tenderness  Neuro-     Cognitive - awake, alert, oriented to person and place. Delayed processing/response     Cranial Nerves - CN 2-12 intact. No facial asymmetry, Tongue midline, EOMI, Shoulder shrug intact     Motor -                     LEFT    UE - 3+5                    RIGHT UE - 3-/5                    LEFT    LE - KE 4/5                    RIGHT LE - KE 4/5        Sensory - Intact to LT bilaterally  MSK: generalized weakness  Skin:  stage 2 pressure ulcer to sacrum 0.3 x 0.2,  discoloration to lateral RLQ of abdomen    RECENT LABS:        Uric Acid, Serum: 3.6 mg/dL (22 @ 06:45)   C-Reactive Protein, Serum: 200 mg/L (22 @ 06:45)   Phosphorus Level, Serum: 3.8 mg/dL (22 @ 06:45)   Intact PTH: 183  Lactate Dehydrogenase, Serum: 257 U/L (22 @ 06:45)   Magnesium, Serum: 1.8 mg/dL (22 @ 06:45)      INR ----- pending                       7.4    6.16  )-----------( 290      ( 28 Sep 2022 14:45 )  pending repeat CBC for today             23.6         130<L>  |  91<L>  |  96<H>  ----------------------------<  124<H>  4.5   |  26  |  6.39<H>    Ca    8.6      28 Sep 2022 14:45  Phos  3.8       Mg     1.8         TPro  6.6  /  Alb  1.9<L>  /  TBili  0.3  /  DBili  x   /  AST  28  /  ALT  32  /  AlkPhos  669<H>      LIVER FUNCTIONS - ( 28 Sep 2022 14:45 )  Alb: 1.9 g/dL / Pro: 6.6 g/dL / ALK PHOS: 669 U/L / ALT: 32 U/L / AST: 28 U/L / GGT: x           PT/INR - ( 28 Sep 2022 06:40 )   PT: 24.7 sec;   INR: 2.11 ratio      CAPILLARY BLOOD GLUCOSE  POCT Blood Glucose.: 151 mg/dL (29 Sep 2022 08:14)  POCT Blood Glucose.: 147 mg/dL (28 Sep 2022 21:37)  POCT Blood Glucose.: 102 mg/dL (28 Sep 2022 18:15)  POCT Blood Glucose.: 123 mg/dL (28 Sep 2022 12:01)    Xray Chest 1 View- PORTABLE-Routine (Xray Chest 1 View- PORTABLE-Routine .) (22 @ 12:04) >  Interval exchange of the dialysis catheter. No evidence of pneumothorax.   No other significant change.    Allergies  No Known Allergies    MEDICATIONS  (STANDING):  amLODIPine   Tablet 10 milliGRAM(s) Oral daily  artificial  tears Solution 2 Drop(s) Both EYES every 6 hours  brivaracetam 50 milliGRAM(s) Oral <User Schedule>  brivaracetam 50 milliGRAM(s) Oral <User Schedule>  carvedilol 12.5 milliGRAM(s) Oral every 12 hours  chlorhexidine 2% Cloths 1 Application(s) Topical <User Schedule>  dextrose 5%. 1000 milliLiter(s) (50 mL/Hr) IV Continuous <Continuous>  dextrose 5%. 1000 milliLiter(s) (100 mL/Hr) IV Continuous <Continuous>  dextrose 50% Injectable 25 Gram(s) IV Push once  dextrose 50% Injectable 12.5 Gram(s) IV Push once  dextrose 50% Injectable 25 Gram(s) IV Push once  doxazosin 4 milliGRAM(s) Oral <User Schedule>  epoetin cortney-epbx (RETACRIT) Injectable 47299 Unit(s) IV Push <User Schedule>  finasteride 5 milliGRAM(s) Oral daily  furosemide    Tablet 80 milliGRAM(s) Oral daily  glucagon  Injectable 1 milliGRAM(s) IntraMuscular once  insulin glargine Injectable (LANTUS) 5 Unit(s) SubCutaneous at bedtime  insulin lispro (ADMELOG) corrective regimen sliding scale   SubCutaneous three times a day before meals  insulin lispro (ADMELOG) corrective regimen sliding scale   SubCutaneous at bedtime  insulin lispro Injectable (ADMELOG) 5 Unit(s) SubCutaneous three times a day before meals  levothyroxine 50 MICROGram(s) Oral daily  multivitamin 1 Tablet(s) Oral daily  mycophenolate mofetil 500 milliGRAM(s) Oral <User Schedule>  nystatin    Suspension 053143 Unit(s) Oral four times a day  pantoprazole    Tablet 40 milliGRAM(s) Oral <User Schedule>  phenytoin   Capsule 200 milliGRAM(s) Oral two times a day  predniSONE   Tablet 10 milliGRAM(s) Oral daily  ramelteon 8 milliGRAM(s) Oral at bedtime  sevelamer carbonate 800 milliGRAM(s) Oral three times a day with meals  sirolimus 7 milliGRAM(s) Oral <User Schedule>  trimethoprim   80 mG/sulfamethoxazole 400 mG 1 Tablet(s) Oral daily  valGANciclovir 450 milliGRAM(s) Oral <User Schedule>    MEDICATIONS  (PRN):  acetaminophen     Tablet .. 650 milliGRAM(s) Oral every 6 hours PRN Temp greater or equal to 38C (100.4F), Mild Pain (1 - 3)  dextrose Oral Gel 15 Gram(s) Oral once PRN Blood Glucose LESS THAN 70 milliGRAM(s)/deciliter  polyethylene glycol 3350 17 Gram(s) Oral daily PRN Constipation   SUBJECTIVE/ROS:  Patient seen and evaluated at bedside - sitting up in WC  slept overnight  energy level feels low today - informed that his hgb was low as of yesterday - will be repeating CBC, and ordered for FOBT  Cough fine today - pending CXR   Denies CP, palpitation, SOB, +intermittent cough  No HA, dizziness, or lightheadedness  no nausea or abd pain.  LBM   minimal to none urine output - on HD (HD yesterday)    Vital Signs Last 24 Hrs  T(C): 36.7 (22 @ 20:17), Max: 36.7 (22 @ 17:45)  T(F): 98.1 (22 @ 20:17), Max: 98.1 (22 @ 20:17)  HR: 65 (22 @ 05:21) (59 - 68)  BP: 149/72 (22 @ 05:21) (144/66 - 153/66)  RR: 16 (22 @ 05:21) (16 - 16)  SpO2: 97% (22 @ 05:21) (97% - 100%) on room air     Daily -  Admission weight () 69.8 kg        Daily Weight in k.2 (29 Sep 2022 05:24)    Gen - NAD, comfortable on RA  HEENT - NCAT, EOMI, MMM  Neck - Supple, No limited ROM  Pulm - diminished to bilateral base  Cardiovascular - warm and well perfused  Chest - left chest wall permcath  Abdomen -  distended, but nontender, tympanic  Extremities - No Cyanosis, no clubbing, no edema, no calf tenderness  Neuro-     Cognitive - awake, alert, oriented to person and place. Delayed processing/response     Cranial Nerves - CN 2-12 intact. No facial asymmetry, Tongue midline, EOMI, Shoulder shrug intact     Motor -                     LEFT    UE - 3+5                    RIGHT UE - 3-/5                    LEFT    LE - KE 4/5                    RIGHT LE - KE 4/5        Sensory - Intact to LT bilaterally  MSK: generalized weakness  Skin:  stage 2 pressure ulcer to sacrum 0.3 x 0.2,  discoloration to lateral RLQ of abdomen    RECENT LABS:        Uric Acid, Serum: 3.6 mg/dL (22 @ 06:45)   C-Reactive Protein, Serum: 200 mg/L (22 @ 06:45)   Phosphorus Level, Serum: 3.8 mg/dL (22 @ 06:45)   Intact PTH: 183  Lactate Dehydrogenase, Serum: 257 U/L (22 @ 06:45)   Magnesium, Serum: 1.8 mg/dL (22 @ 06:45)      INR -----2.43                       7.4    6.16  )-----------( 290      ( 28 Sep 2022 14:45 )  Repeat Hb 8.7             23.6         130<L>  |  91<L>  |  96<H>  ----------------------------<  124<H>  4.5   |  26  |  6.39<H>    Ca    8.6      28 Sep 2022 14:45  Phos  3.8       Mg     1.8         TPro  6.6  /  Alb  1.9<L>  /  TBili  0.3  /  DBili  x   /  AST  28  /  ALT  32  /  AlkPhos  669<H>      LIVER FUNCTIONS - ( 28 Sep 2022 14:45 )  Alb: 1.9 g/dL / Pro: 6.6 g/dL / ALK PHOS: 669 U/L / ALT: 32 U/L / AST: 28 U/L / GGT: x           PT/INR - ( 28 Sep 2022 06:40 )   PT: 24.7 sec;   INR: 2.11 ratio      CAPILLARY BLOOD GLUCOSE  POCT Blood Glucose.: 151 mg/dL (29 Sep 2022 08:14)  POCT Blood Glucose.: 147 mg/dL (28 Sep 2022 21:37)  POCT Blood Glucose.: 102 mg/dL (28 Sep 2022 18:15)  POCT Blood Glucose.: 123 mg/dL (28 Sep 2022 12:01)    Xray Chest 1 View- PORTABLE-Routine (Xray Chest 1 View- PORTABLE-Routine .) (22 @ 12:04) >  Interval exchange of the dialysis catheter. No evidence of pneumothorax.   No other significant change.    Allergies  No Known Allergies    MEDICATIONS  (STANDING):  amLODIPine   Tablet 10 milliGRAM(s) Oral daily  artificial  tears Solution 2 Drop(s) Both EYES every 6 hours  brivaracetam 50 milliGRAM(s) Oral <User Schedule>  brivaracetam 50 milliGRAM(s) Oral <User Schedule>  carvedilol 12.5 milliGRAM(s) Oral every 12 hours  chlorhexidine 2% Cloths 1 Application(s) Topical <User Schedule>  dextrose 5%. 1000 milliLiter(s) (50 mL/Hr) IV Continuous <Continuous>  dextrose 5%. 1000 milliLiter(s) (100 mL/Hr) IV Continuous <Continuous>  dextrose 50% Injectable 25 Gram(s) IV Push once  dextrose 50% Injectable 12.5 Gram(s) IV Push once  dextrose 50% Injectable 25 Gram(s) IV Push once  doxazosin 4 milliGRAM(s) Oral <User Schedule>  epoetin cortney-epbx (RETACRIT) Injectable 41879 Unit(s) IV Push <User Schedule>  finasteride 5 milliGRAM(s) Oral daily  furosemide    Tablet 80 milliGRAM(s) Oral daily  glucagon  Injectable 1 milliGRAM(s) IntraMuscular once  insulin glargine Injectable (LANTUS) 5 Unit(s) SubCutaneous at bedtime  insulin lispro (ADMELOG) corrective regimen sliding scale   SubCutaneous three times a day before meals  insulin lispro (ADMELOG) corrective regimen sliding scale   SubCutaneous at bedtime  insulin lispro Injectable (ADMELOG) 5 Unit(s) SubCutaneous three times a day before meals  levothyroxine 50 MICROGram(s) Oral daily  multivitamin 1 Tablet(s) Oral daily  mycophenolate mofetil 500 milliGRAM(s) Oral <User Schedule>  nystatin    Suspension 767755 Unit(s) Oral four times a day  pantoprazole    Tablet 40 milliGRAM(s) Oral <User Schedule>  phenytoin   Capsule 200 milliGRAM(s) Oral two times a day  predniSONE   Tablet 10 milliGRAM(s) Oral daily  ramelteon 8 milliGRAM(s) Oral at bedtime  sevelamer carbonate 800 milliGRAM(s) Oral three times a day with meals  sirolimus 7 milliGRAM(s) Oral <User Schedule>  trimethoprim   80 mG/sulfamethoxazole 400 mG 1 Tablet(s) Oral daily  valGANciclovir 450 milliGRAM(s) Oral <User Schedule>    MEDICATIONS  (PRN):  acetaminophen     Tablet .. 650 milliGRAM(s) Oral every 6 hours PRN Temp greater or equal to 38C (100.4F), Mild Pain (1 - 3)  dextrose Oral Gel 15 Gram(s) Oral once PRN Blood Glucose LESS THAN 70 milliGRAM(s)/deciliter  polyethylene glycol 3350 17 Gram(s) Oral daily PRN Constipation

## 2022-09-29 NOTE — PHARMACOTHERAPY INTERVENTION NOTE - COMMENTS
Coordinated delivery of prescription medications to pt's home with rehab providers, [facility], and transplant team. Pt cannot receive transplant medications from [facility] until day after discharge due to restrictions with Medicare Part B. Will provide pt with 2 day supply of prednisone, mycophenolate, and sirolimus upon discharge.
Patient's INR today (09/18 5:30 AM) was 3.11. Patient received 3mg on 09/17, previous dose of 7.5mg was on 09/16.   Recommended to decrease Warfarin dose

## 2022-09-29 NOTE — PROGRESS NOTE ADULT - ASSESSMENT
66 yr old Male with PMHx of DM type II, HTN, CAD s/p stents/ CABG (2020), AFib on Eliquis, CVA (2019) d/t Afib, Seizure d/o following CVA, last episode was on 4/8/22, h/o GIB (2/2022) EGD with duodenal ulcer.  ESRD due to DM was on HD since 2019, s/p premacath removal 5/10/22 s/p Right DDRT (4/21/22) and placed on belatacept.  Frequent hospitalization d/t weakness, refractory sz, and sepsis.  Admitted and intubated 6/10, found to large pleural effusion s/p thoracentesis ~1.3 and VATS of thoracic 2.2 L.      TRANSPLANT PATIENT -  * INR ------pending ----- (9/29) - coumadin to be dosed  * Hgb downtrending - hgb 7.3 (9/28), repeat cbc today, FOBT also ordered  * transplant team PHONE  --Iker Alfred (MD) ??  Dr. Eduardo Barber (transplant surgeon) - Darleen Dior NP [phone]*  * Barby MUNOZ - recommendations for labs: BK virus DNA (negative 9/22, 9/29 pending), lactate ( 340 9/22 -> 257 9/29 ), mag (1.9 - 1.8 9/29 ), parathyroid (178 -> 183 9/29), phosphorus ( 3.7 -> 3.8 9/29), CRP (190 -> 200 9/29), uric acid (3.2 -> 3.6 9/29), and UA (oliguria).  CMV pcr (negative 9/22, 9/29 pending)  *Barby MUNOZ emailed in regards to lab monitoring and to review discharge medications ---- awaiting for response  * HD M/W/F - need additional brivaracetam after HD  * repeat CXR ***** to be done  * Family working with transplant team for dc planning - all meds been [facility] and to be sent to pt's home    COMORBIDITES/ACTIVE MEDICAL ISSUES   Gait Instability, ADL impairments and Functional impairments secondary to recurrent UTI, status epilepticus, with debility seizures. 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  Belatacept d/t 8/1 and was started on Sirolimus 7mg QD  - Prednisone 10  - Cellcept 250 BID on hold  - S/p perma cath placement 8/30/2022  - PPx medications: Nystatin, Bactrim, Valcyte (MWF)  - Nephrology following - modified to [facility]  - Strongyloides Antibodies positive - Ivermectin x1 dose (9/21)    #Anemia of chronic disease  - Has gotten total of 6U PRBC and 2 U FFP during acute hospital   - monitor H/H 8.0 (9/12) - 8.4 (9/23) -> 7.8  (9/26) -> 7.3 (9/28) - repeat 9/29    #HTN  #Afib  - Eliquis switched to Coumadin  - Coreg   - Daily INR til therapeutic on a consistent coumadin dose    #Diabetes  - A1C  - Lantus  - standing Admelog dc, restarted 9/16   - FS + ISS  - Hospitalist following   - FS <180 (9/29) stable    #Hypothyroidism  - Synthroid 37.5 mcg    #Pain control  - Tylenol PRN    #GI/Bowel Mgmt   - At risk for constipation due to neurologic diagnosis, immobility and/or medication use  - Senna d/c d/t loose BM 9/11  -  Miralax PRN    #Bladder management  - incontinence     #DVT   - acute DVT to R IJ thrombosis, Superficial vein thrombus to right basilic and cephalic vein  - Coumadin  - SCDs, TEDs     #Skin:  -Stage 2 pressure ulcer to sacrum 0.3 x 0.2- cleanse with NS, apply wet gauze andcover with allevyn foam dressing  -At risk for pressure injury due to neurologic diagnosis and relative immobility  -Bilateral heels - soft heel protectors, apply liquid barrier film daily and monitor for tissue type changes  - Turn Q2 hr while in bed, air mattress  - Skin barrier cream as needed  - Nursing to monitor skin Qshift    Diet  - Regular/easy to chew + Thins  [CCHO, renal diet]    + Fluid Restriction: 1L    Precautions / PROPHYLAXIS:   - Falls, Cardiac, Seizure   - ortho: Weight bearing status: WBAT   - Lungs: Aspiration, Incentive Spirometer   - Pressure injury/Skin: Turn Q2hrs while in bed, OOB to Chair, PT/OT      liaison with family 9/12--Spoke with son Dr Perez  He gave update on patient's clinical state, investigation results and function prior to acute rehab treatment   I gave update on patients current status, and he was happy with same  Plan to to give interval updates to family as needed    9/22/22--I discussed with his second son (an RN) at patient's bed side  He reports family's plan for home dc, arrangements made to care for patient--his wife is always at home, and sons make time available    IDT 9/26   lives with spouse and 2 sons in [place] with 6 [facility], [facility] setup.  PTA needed some assistance with ADLs  Functional status: mod A x 1 with stand piovt with and wo RW (needs cutes), mod A to initiate task.  Eating with incidental Assist.  Transfers: sit->stand min A, but mod A + cues with stand to sit, mod A to WC. Ambulated 50' RW min + WCF  Goal: min A/needs hands on A, mod A shower transfer  SLP: discharge as per family   TDD: 9/29 Home need 24/7 assistance.  Family putting in ramp on Wednesday (9/28)    Follow ups:  Andre Patterson)  Clinical Neurophysiology; EEGEpilepsy; Neurology  [address], [address]  [place] [address]  Phone: [phone]  Fax: [phone]  Follow Up Time:     Padmnii Lincoln ()  Internal Medicine  [address], [address]  [place] [address]  Phone: [phone]  Fax: [phone]  Follow Up Time:     Iker Alfred (MD)  Internal Medicine; Nephrology  [address] [place] [address]  Phone: [phone]  Fax: [phone]  Follow Up Time:    Dr. Eduardo Barber - Organ Transplant    Iker Alfred  [facility]  NEPHRO [address] [place] [address] D  Scheduled Appointment: 09/20/2022    Iker Alfred  [facility]  NEPH[address]  Scheduled Appointment: 10/20/2022     66 yr old Male with PMHx of DM type II, HTN, CAD s/p stents/ CABG (2020), AFib on Eliquis, CVA (2019) d/t Afib, Seizure d/o following CVA, last episode was on 4/8/22, h/o GIB (2/2022) EGD with duodenal ulcer.  ESRD due to DM was on HD since 2019, s/p premacath removal 5/10/22 s/p Right DDRT (4/21/22) and placed on belatacept.  Frequent hospitalization d/t weakness, refractory sz, and sepsis.  Admitted and intubated 6/10, found to large pleural effusion s/p thoracentesis ~1.3 and VATS of thoracic 2.2 L.      TRANSPLANT PATIENT -  * INR -----2.4 on 9/29  * Hgb downtrending - hgb 7.3 (9/28), repeat cbc today, FOBT also ordered  * transplant team PHONE  --Iker Alfred (MD) ??  Dr. Eduardo Barber (transplant surgeon) - Darleen Dior NP [phone]*  * Barby NP - recommendations for labs: BK virus DNA (negative 9/22, 9/29 pending), lactate ( 340 9/22 -> 257 9/29 ), mag (1.9 - 1.8 9/29 ), parathyroid (178 -> 183 9/29), phosphorus ( 3.7 -> 3.8 9/29), CRP (190 -> 200 9/29), uric acid (3.2 -> 3.6 9/29), and UA (oliguria).  CMV pcr (negative 9/22, 9/29 pending)  *Barby MUNOZ emailed in regards to lab monitoring and to review discharge medications ---- awaiting for response  * HD M/W/F - need additional brivaracetam after HD  * repeat CXR ***** to be done  * Family working with transplant team for dc planning - all meds been [facility] and to be sent to pt's home    COMORBIDITES/ACTIVE MEDICAL ISSUES   Gait Instability, ADL impairments and Functional impairments secondary to recurrent UTI, status epilepticus, with debility seizures. 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  Belatacept d/t 8/1 and was started on Sirolimus 7mg QD  - Prednisone 10  - Cellcept 250 BID on hold  - S/p perma cath placement 8/30/2022  - PPx medications: Nystatin, Bactrim, Valcyte (MWF)  - Nephrology following - modified to [facility]  - Strongyloides Antibodies positive - Ivermectin x1 dose (9/21)    #Anemia of chronic disease  - Has gotten total of 6U PRBC and 2 U FFP during acute hospital   - monitor H/H 8.0 (9/12) - 8.4 (9/23) -> 7.8  (9/26) -> 7.3 (9/28) - repeat 9/29    #HTN  #Afib  - Eliquis switched to Coumadin  - Coreg   - Daily INR til therapeutic on a consistent coumadin dose    #Diabetes  - A1C  - Lantus  - standing Admelog dc, restarted 9/16   - FS + ISS  - Hospitalist following   - FS <180 (9/29) stable    #Hypothyroidism  - Synthroid 37.5 mcg    #Pain control  - Tylenol PRN    #GI/Bowel Mgmt   - At risk for constipation due to neurologic diagnosis, immobility and/or medication use  - Senna d/c d/t loose BM 9/11  -  Miralax PRN    #Bladder management  - incontinence     #DVT   - acute DVT to R IJ thrombosis, Superficial vein thrombus to right basilic and cephalic vein  - Coumadin  - SCDs, TEDs     #Skin:  -Stage 2 pressure ulcer to sacrum 0.3 x 0.2- cleanse with NS, apply wet gauze andcover with allevyn foam dressing  -At risk for pressure injury due to neurologic diagnosis and relative immobility  -Bilateral heels - soft heel protectors, apply liquid barrier film daily and monitor for tissue type changes  - Turn Q2 hr while in bed, air mattress  - Skin barrier cream as needed  - Nursing to monitor skin Qshift    Diet  - Regular/easy to chew + Thins  [CCHO, renal diet]    + Fluid Restriction: 1L    Precautions / PROPHYLAXIS:   - Falls, Cardiac, Seizure   - ortho: Weight bearing status: WBAT   - Lungs: Aspiration, Incentive Spirometer   - Pressure injury/Skin: Turn Q2hrs while in bed, OOB to Chair, PT/OT      liaison with family 9/12--Spoke with son Dr Perez  He gave update on patient's clinical state, investigation results and function prior to acute rehab treatment   I gave update on patients current status, and he was happy with same  Plan to to give interval updates to family as needed    9/22/22--I discussed with his second son (an RN) at patient's bed side  He reports family's plan for home dc, arrangements made to care for patient--his wife is always at home, and sons make time available    IDT 9/26   lives with spouse and 2 sons in [place] with 6 [facility], [facility] setup.  PTA needed some assistance with ADLs  Functional status: mod A x 1 with stand piovt with and wo RW (needs cutes), mod A to initiate task.  Eating with incidental Assist.  Transfers: sit->stand min A, but mod A + cues with stand to sit, mod A to WC. Ambulated 50' RW min + WCF  Goal: min A/needs hands on A, mod A shower transfer  SLP: discharge as per family   TDD: 9/29 Home need 24/7 assistance.  Family putting in ramp on Wednesday (9/28)    Follow ups:  Andre Patterson)  Clinical Neurophysiology; EEGEpilepsy; Neurology  [address], [address]  [place] [address]  Phone: [phone]  Fax: [phone]  Follow Up Time:     Padmini Lincoln ()  Internal Medicine  [address] [facility] [address]  [place] [address]  Phone: [phone]  Fax: [phone]  Follow Up Time:     Iker Alfred (MD)  Internal Medicine; Nephrology  [address] [place] [address]  Phone: [phone]  Fax: [phone]  Follow Up Time:    Dr. Eduardo Barber - Organ Transplant    Iker Alfred  [facility]  NEPHRO [address] [place] [address]  Scheduled Appointment: 09/20/2022    Iker Alfred  [facility]  NEPH[address]  Scheduled Appointment: 10/20/2022

## 2022-09-29 NOTE — PROGRESS NOTE ADULT - SUBJECTIVE AND OBJECTIVE BOX
Resting, comfortable on RA    Vital Signs Last 24 Hrs  T(C): 36.7 (09-28-22 @ 20:17), Max: 36.7 (09-28-22 @ 17:45)  T(F): 98.1 (09-28-22 @ 20:17), Max: 98.1 (09-28-22 @ 20:17)  HR: 65 (09-29-22 @ 05:21) (63 - 68)  BP: 149/72 (09-29-22 @ 05:21) (148/73 - 153/66)  RR: 16 (09-29-22 @ 05:21) (16 - 16)  SpO2: 97% (09-29-22 @ 05:21) (97% - 100%)    s1s2  b/l air entry  soft, ND  no edema                                                                       8.7    6.31  )-----------( 301      ( 29 Sep 2022 12:20 )             28.6     28 Sep 2022 14:45    130    |  91     |  96     ----------------------------<  124    4.5     |  26     |  6.39     Ca    8.6        28 Sep 2022 14:45  Phos  3.8       29 Sep 2022 06:45  Mg     1.8       29 Sep 2022 06:45    TPro  6.6    /  Alb  1.9    /  TBili  0.3    /  DBili  x      /  AST  28     /  ALT  32     /  AlkPhos  669    28 Sep 2022 14:45    LIVER FUNCTIONS - ( 28 Sep 2022 14:45 )  Alb: 1.9 g/dL / Pro: 6.6 g/dL / ALK PHOS: 669 U/L / ALT: 32 U/L / AST: 28 U/L / GGT: x           PT/INR - ( 29 Sep 2022 12:20 )   PT: 28.4 sec;   INR: 2.43 ratio      acetaminophen     Tablet .. 650 milliGRAM(s) Oral every 6 hours PRN  amLODIPine   Tablet 10 milliGRAM(s) Oral daily  artificial  tears Solution 2 Drop(s) Both EYES every 6 hours  brivaracetam 50 milliGRAM(s) Oral <User Schedule>  brivaracetam 50 milliGRAM(s) Oral <User Schedule>  carvedilol 12.5 milliGRAM(s) Oral every 12 hours  chlorhexidine 2% Cloths 1 Application(s) Topical <User Schedule>  dextrose 5%. 1000 milliLiter(s) IV Continuous <Continuous>  dextrose 5%. 1000 milliLiter(s) IV Continuous <Continuous>  dextrose 50% Injectable 25 Gram(s) IV Push once  dextrose 50% Injectable 12.5 Gram(s) IV Push once  dextrose 50% Injectable 25 Gram(s) IV Push once  dextrose Oral Gel 15 Gram(s) Oral once PRN  doxazosin 4 milliGRAM(s) Oral <User Schedule>  epoetin cortney-epbx (RETACRIT) Injectable 18256 Unit(s) IV Push <User Schedule>  finasteride 5 milliGRAM(s) Oral daily  furosemide    Tablet 80 milliGRAM(s) Oral daily  glucagon  Injectable 1 milliGRAM(s) IntraMuscular once  insulin glargine Injectable (LANTUS) 5 Unit(s) SubCutaneous at bedtime  insulin lispro (ADMELOG) corrective regimen sliding scale   SubCutaneous three times a day before meals  insulin lispro (ADMELOG) corrective regimen sliding scale   SubCutaneous at bedtime  insulin lispro Injectable (ADMELOG) 5 Unit(s) SubCutaneous three times a day before meals  levothyroxine 50 MICROGram(s) Oral daily  multivitamin 1 Tablet(s) Oral daily  mycophenolate mofetil 500 milliGRAM(s) Oral <User Schedule>  nystatin    Suspension 213354 Unit(s) Oral four times a day  pantoprazole    Tablet 40 milliGRAM(s) Oral <User Schedule>  phenytoin   Capsule 200 milliGRAM(s) Oral two times a day  polyethylene glycol 3350 17 Gram(s) Oral daily PRN  predniSONE   Tablet 10 milliGRAM(s) Oral daily  ramelteon 8 milliGRAM(s) Oral at bedtime  sevelamer carbonate 800 milliGRAM(s) Oral three times a day with meals  sirolimus 7 milliGRAM(s) Oral <User Schedule>  trimethoprim   80 mG/sulfamethoxazole 400 mG 1 Tablet(s) Oral daily  valGANciclovir 450 milliGRAM(s) Oral <User Schedule>  warfarin 6 milliGRAM(s) Oral once    A/P:    S/p complicated hospital course as per HPI  S/p DDRT 4/21/22, required HD, came off HD since 5/5/22, back on HD since 7/27/22  Continue transplant meds per transplant team recommendations  Avoid nephrotoxins  HD MWF as ordered  Epoetin w/HD 3 x week  TMP as able  Renal diet  Pt will f/u w/transplant team after d/c    [phone]

## 2022-09-30 NOTE — DISCHARGE NOTE NURSING/CASE MANAGEMENT/SOCIAL WORK - PATIENT PORTAL LINK FT
You can access the FollowMyHealth Patient Portal offered by [facility] by registering at the following website: [email or web address]. By joining [facility]’s FollowMyHealth portal, you will also be able to view your health information using other applications (apps) compatible with our system.

## 2022-09-30 NOTE — PROGRESS NOTE ADULT - SUBJECTIVE AND OBJECTIVE BOX
Patient is a 66y old  Male who presents with a chief complaint of Debility (29 Sep 2022 16:52)    No events overnight  Patient seen and examined at bedside.    ALLERGIES:  No Known Allergies    MEDICATIONS  (STANDING):  amLODIPine   Tablet 10 milliGRAM(s) Oral daily  artificial  tears Solution 2 Drop(s) Both EYES every 6 hours  brivaracetam 50 milliGRAM(s) Oral <User Schedule>  brivaracetam 50 milliGRAM(s) Oral <User Schedule>  carvedilol 12.5 milliGRAM(s) Oral every 12 hours  chlorhexidine 2% Cloths 1 Application(s) Topical <User Schedule>  dextrose 5%. 1000 milliLiter(s) (100 mL/Hr) IV Continuous <Continuous>  dextrose 5%. 1000 milliLiter(s) (50 mL/Hr) IV Continuous <Continuous>  dextrose 50% Injectable 25 Gram(s) IV Push once  dextrose 50% Injectable 12.5 Gram(s) IV Push once  dextrose 50% Injectable 25 Gram(s) IV Push once  doxazosin 4 milliGRAM(s) Oral <User Schedule>  epoetin cortney-epbx (RETACRIT) Injectable 80845 Unit(s) IV Push <User Schedule>  finasteride 5 milliGRAM(s) Oral daily  furosemide    Tablet 80 milliGRAM(s) Oral daily  glucagon  Injectable 1 milliGRAM(s) IntraMuscular once  insulin glargine Injectable (LANTUS) 5 Unit(s) SubCutaneous at bedtime  insulin lispro (ADMELOG) corrective regimen sliding scale   SubCutaneous three times a day before meals  insulin lispro (ADMELOG) corrective regimen sliding scale   SubCutaneous at bedtime  insulin lispro Injectable (ADMELOG) 5 Unit(s) SubCutaneous three times a day before meals  levothyroxine 50 MICROGram(s) Oral daily  multivitamin 1 Tablet(s) Oral daily  mycophenolate mofetil 500 milliGRAM(s) Oral <User Schedule>  nystatin    Suspension 063195 Unit(s) Oral four times a day  pantoprazole    Tablet 40 milliGRAM(s) Oral <User Schedule>  phenytoin   Capsule 200 milliGRAM(s) Oral two times a day  predniSONE   Tablet 10 milliGRAM(s) Oral daily  ramelteon 8 milliGRAM(s) Oral at bedtime  sevelamer carbonate 800 milliGRAM(s) Oral three times a day with meals  sirolimus 7 milliGRAM(s) Oral <User Schedule>  trimethoprim   80 mG/sulfamethoxazole 400 mG 1 Tablet(s) Oral daily  valGANciclovir 450 milliGRAM(s) Oral <User Schedule>    MEDICATIONS  (PRN):  acetaminophen     Tablet .. 650 milliGRAM(s) Oral every 6 hours PRN Temp greater or equal to 38C (100.4F), Mild Pain (1 - 3)  dextrose Oral Gel 15 Gram(s) Oral once PRN Blood Glucose LESS THAN 70 milliGRAM(s)/deciliter  polyethylene glycol 3350 17 Gram(s) Oral daily PRN Constipation    Vital Signs Last 24 Hrs  T(F): 98.2 (30 Sep 2022 09:09), Max: 98.5 (30 Sep 2022 05:23)  HR: 72 (30 Sep 2022 09:09) (63 - 79)  BP: 127/51 (30 Sep 2022 09:09) (127/51 - 161/67)  RR: 15 (30 Sep 2022 09:09) (15 - 16)  SpO2: 98% (30 Sep 2022 09:09) (96% - 99%)  I&O's Summary      PHYSICAL EXAM:  General: NAD, A/O x 3  ENT: MMM, no scleral icterus  Neck: Supple, No JVD, no thyroidomegaly  Lungs: Clear to auscultation bilaterally, no wheezes, no rales, no rhonchi, good inspiratory effort  Cardio: RRR, S1/S2, No murmurs  Abdomen: Soft, Nontender, Nondistended; Bowel sounds present  Extremities: No calf tenderness, No pitting edema, no skin changes    LABS:                        8.7    6.31  )-----------( 301      ( 29 Sep 2022 12:20 )             28.6       09-28    130  |  91  |  96  ----------------------------<  124  4.5   |  26  |  6.39    Ca    8.6      28 Sep 2022 14:45  Phos  3.8     09-29  Mg     1.8     09-29    TPro  6.6  /  Alb  1.9  /  TBili  0.3  /  DBili  x   /  AST  28  /  ALT  32  /  AlkPhos  669  09-28  PT/INR - ( 29 Sep 2022 12:20 )   PT: 28.4 sec;   INR: 2.43 ratio      07-27 Chol -- LDL -- HDL -- Trig 118 mg/dL    POCT Blood Glucose.: 159 mg/dL (30 Sep 2022 08:14)  POCT Blood Glucose.: 142 mg/dL (29 Sep 2022 21:04)  POCT Blood Glucose.: 159 mg/dL (29 Sep 2022 17:33)  POCT Blood Glucose.: 178 mg/dL (29 Sep 2022 11:50)      COVID-19 PCR: NotDetec (09-27-22 @ 08:00)  COVID-19 PCR: NotDetec (09-21-22 @ 05:45)  COVID-19 PCR: NotDetec (09-15-22 @ 05:41)  COVID-19 PCR: NotDetec (09-09-22 @ 07:43)  COVID-19 PCR: NotDetec (09-07-22 @ 18:29)  COVID-19 PCR: NotDetec (09-27-22 @ 08:00)  COVID-19 PCR: NotDetec (09-21-22 @ 05:45)  COVID-19 PCR: NotDetec (09-15-22 @ 05:41)  COVID-19 PCR: NotDetec (09-09-22 @ 07:43)  COVID-19 PCR: NotDetec (09-07-22 @ 18:29)  COVID-19 PCR: NotDetec (09-01-22 @ 07:05)  COVID-19 PCR: NotDetec (08-29-22 @ 11:09)  COVID-19 PCR: NotDetec (08-25-22 @ 18:36)  COVID-19 PCR: NotDetec (08-22-22 @ 06:42)  COVID-19 PCR: NotDetec (08-15-22 @ 17:07)

## 2022-09-30 NOTE — CONSULT NOTE ADULT - SUBJECTIVE AND OBJECTIVE BOX
INTERVAL HPI/OVERNIGHT EVENTS:  HPI:  66 yr old Male with PMHx of DM type II, HTN, CAD s/p stents/ CABG (), AFib on Eliquis, CVA () d/t Afib, Seizure d/o following CVA, last episode was on 22, h/o GIB (2022) EGD with duodenal ulcer.  ESRD due to DM was on HD since , s/p premacath removal 5/10/22 s/p Right DDRT (22) and placed on belatacept.  Recent hospitalization 22 -5/10/22 with weakness/anemia found to have perinephric hematoma requiring evacuation and repair of bleeding arterial anastomosis. Was discharged to [facility], currently being treated for UTI with Levaquin, developed multiple sz episodes refractory to 5 mg versed IM (EMS) and 2 mg ativan IV in E.D. concerning for status epilepticus requiring intubation for hypoxic respiratory failure. MICU Consult was called for hypoxic respiratory failure secondary to status epilepticus. Was recently dx with klebsiella UTI - started with ertapenem which was then switched to Levaquin. He also had Parainfluenza PNA.      Patient readmitted 6/10 ED VS temp 97.8 Axillary, HR 61 RR26 100% on NRB mask. Per ED noted the patient was noted to be gurgling and was unable to protect airway, and was intubated.  EEG without Sz activity.  patient was extubated and had thoracentesis (1.3L)  for right pleural effusion - post op he needed 5U PRBC and 2 U FFP.  Was reintubated  - CT found hemothorax ~1.6 L.  S/p VATS with thoracic 2.2 L old blood removed and 2nd chest tube placed (removed ).  R IJ DVT () - heparin switched to lovenox.  Noted with refractory Sx and AMS debility and placed on tube feed (7/10).    Hospital course complicated by sepsis - UTI treated with Ertapenem.  Despite abx, he had intermittent fevers.  s/p Urethral stent (). Restarted HD () + 1 U PRBC.  Immunosuppressive drug switched to Sirolimus.  Noted again with refractory seizures on  - EEG with 1 seizure, Brivaracetam started ().  AC switched from eliquis to coumadin (d/t 40% less efficacy while on phenytoin). (08 Sep 2022 13:35)    GI consulted to see patient due to anemia. Patient seen and examined, son present during exam, and second son ( cardiologist) present via phone. Per family patient had EGD in april, chart reviewed and normal. Prior EGD was he was found to have ulceration. Last colonoscopy was 3 years ago and reported to be normal. No BRBPR or melena reported by staff. He is on anticoagulation for afib.     MEDICATIONS  (STANDING):  amLODIPine   Tablet 10 milliGRAM(s) Oral daily  artificial  tears Solution 2 Drop(s) Both EYES every 6 hours  brivaracetam 50 milliGRAM(s) Oral <User Schedule>  brivaracetam 50 milliGRAM(s) Oral <User Schedule>  carvedilol 12.5 milliGRAM(s) Oral every 12 hours  chlorhexidine 2% Cloths 1 Application(s) Topical <User Schedule>  dextrose 5%. 1000 milliLiter(s) (100 mL/Hr) IV Continuous <Continuous>  dextrose 5%. 1000 milliLiter(s) (50 mL/Hr) IV Continuous <Continuous>  dextrose 50% Injectable 25 Gram(s) IV Push once  dextrose 50% Injectable 12.5 Gram(s) IV Push once  dextrose 50% Injectable 25 Gram(s) IV Push once  doxazosin 4 milliGRAM(s) Oral <User Schedule>  epoetin cortney-epbx (RETACRIT) Injectable 32523 Unit(s) IV Push <User Schedule>  finasteride 5 milliGRAM(s) Oral daily  furosemide    Tablet 80 milliGRAM(s) Oral daily  glucagon  Injectable 1 milliGRAM(s) IntraMuscular once  insulin glargine Injectable (LANTUS) 5 Unit(s) SubCutaneous at bedtime  insulin lispro (ADMELOG) corrective regimen sliding scale   SubCutaneous three times a day before meals  insulin lispro (ADMELOG) corrective regimen sliding scale   SubCutaneous at bedtime  insulin lispro Injectable (ADMELOG) 5 Unit(s) SubCutaneous three times a day before meals  levothyroxine 50 MICROGram(s) Oral daily  multivitamin 1 Tablet(s) Oral daily  mycophenolate mofetil 500 milliGRAM(s) Oral <User Schedule>  nystatin    Suspension 782085 Unit(s) Oral four times a day  pantoprazole    Tablet 40 milliGRAM(s) Oral <User Schedule>  phenytoin   Capsule 200 milliGRAM(s) Oral two times a day  predniSONE   Tablet 10 milliGRAM(s) Oral daily  ramelteon 8 milliGRAM(s) Oral at bedtime  sevelamer carbonate 800 milliGRAM(s) Oral three times a day with meals  sirolimus 7 milliGRAM(s) Oral <User Schedule>  trimethoprim   80 mG/sulfamethoxazole 400 mG 1 Tablet(s) Oral daily  valGANciclovir 450 milliGRAM(s) Oral <User Schedule>    MEDICATIONS  (PRN):  acetaminophen     Tablet .. 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      Anemia      PEG (percutaneous endoscopic gastrostomy) status  removed 2020      Intubation of airway performed without difficulty  Dec 2019  CVA c/b status epilepticus requiring intubation and PEG in 2019-      ESRD on dialysis  M/W/F      Seizures  after CVA, last seizure was last week 22      History of insertion of stent into coronary artery bypass graft   and       S/P CABG x 3  off pump C3L on 20          REVIEW OF SYSTEMS: negative unless indicated in HPI    non smoker   no etoh abuse    PHYSICAL EXAM:   Vital Signs:  Vital Signs Last 24 Hrs  T(C): 36.7 (30 Sep 2022 12:05), Max: 36.9 (30 Sep 2022 05:23)  T(F): 98 (30 Sep 2022 12:05), Max: 98.5 (30 Sep 2022 05:23)  HR: 67 (30 Sep 2022 12:05) (63 - 79)  BP: 128/61 (30 Sep 2022 12:05) (127/51 - 161/67)  BP(mean): --  RR: 17 (30 Sep 2022 12:05) (15 - 17)  SpO2: 98% (30 Sep 2022 12:05) (96% - 99%)    Parameters below as of 30 Sep 2022 12:05  Patient On (Oxygen Delivery Method): room air      Daily     Daily Weight in k (30 Sep 2022 12:05)I&O's Summary    30 Sep 2022 07:01  -  30 Sep 2022 16:52  --------------------------------------------------------  IN: 800 mL / OUT: 3300 mL / NET: -2500 mL        GENERAL:  Appears stated age,   HEENT:  NC/AT,  conjunctivae clear and pink,  CHEST:  Full & symmetric excursion, no increased effort, breath sounds clear  HEART:  Regular rhythm, S1, S2, no murmur  ABDOMEN:  Soft, non-tender, non-distended, normoactive bowel sounds  EXTEREMITIES:  no edema  SKIN:  No rash/warm/dry  NEURO:  Alert, oriented,      LABS:                        7.2    6.90  )-----------( 314      ( 30 Sep 2022 11:55 )             23.5     09-30    131<L>  |  91<L>  |  87<H>  ----------------------------<  220<H>  4.4   |  25  |  6.18<H>    Ca    8.5      30 Sep 2022 09:50  Phos  4.6     09-30  Mg     1.8     09-29      PT/INR - ( 30 Sep 2022 11:20 )   PT: 30.7 sec;   INR: 2.62 ratio             am

## 2022-09-30 NOTE — PROGRESS NOTE ADULT - SUBJECTIVE AND OBJECTIVE BOX
S/p stable HD this am, comfortable on RA    Vital Signs Last 24 Hrs  T(C): 36.7 (09-30-22 @ 12:05), Max: 36.9 (09-30-22 @ 05:23)  T(F): 98 (09-30-22 @ 12:05), Max: 98.5 (09-30-22 @ 05:23)  HR: 67 (09-30-22 @ 12:05) (63 - 79)  BP: 128/61 (09-30-22 @ 12:05) (127/51 - 161/67)  RR: 17 (09-30-22 @ 12:05) (15 - 17)  SpO2: 98% (09-30-22 @ 12:05) (96% - 99%)    s1s2  b/l air entry  soft, ND  no edema                                                                       7.2    6.90  )-----------( 314      ( 30 Sep 2022 11:55 )             23.5     30 Sep 2022 09:50    131    |  91     |  87     ----------------------------<  220    4.4     |  25     |  6.18     Ca    8.5        30 Sep 2022 09:50  Phos  4.6       30 Sep 2022 09:50  Mg     1.8       29 Sep 2022 06:45    PT/INR - ( 30 Sep 2022 11:20 )   PT: 30.7 sec;   INR: 2.62 ratio      acetaminophen     Tablet .. 650 milliGRAM(s) Oral every 6 hours PRN  amLODIPine   Tablet 10 milliGRAM(s) Oral daily  artificial  tears Solution 2 Drop(s) Both EYES every 6 hours  brivaracetam 50 milliGRAM(s) Oral <User Schedule>  brivaracetam 50 milliGRAM(s) Oral <User Schedule>  carvedilol 12.5 milliGRAM(s) Oral every 12 hours  chlorhexidine 2% Cloths 1 Application(s) Topical <User Schedule>  dextrose 5%. 1000 milliLiter(s) IV Continuous <Continuous>  dextrose 5%. 1000 milliLiter(s) IV Continuous <Continuous>  dextrose 50% Injectable 25 Gram(s) IV Push once  dextrose 50% Injectable 12.5 Gram(s) IV Push once  dextrose 50% Injectable 25 Gram(s) IV Push once  dextrose Oral Gel 15 Gram(s) Oral once PRN  doxazosin 4 milliGRAM(s) Oral <User Schedule>  epoetin cortney-epbx (RETACRIT) Injectable 65813 Unit(s) IV Push <User Schedule>  finasteride 5 milliGRAM(s) Oral daily  furosemide    Tablet 80 milliGRAM(s) Oral daily  glucagon  Injectable 1 milliGRAM(s) IntraMuscular once  insulin glargine Injectable (LANTUS) 5 Unit(s) SubCutaneous at bedtime  insulin lispro (ADMELOG) corrective regimen sliding scale   SubCutaneous three times a day before meals  insulin lispro (ADMELOG) corrective regimen sliding scale   SubCutaneous at bedtime  insulin lispro Injectable (ADMELOG) 5 Unit(s) SubCutaneous three times a day before meals  levothyroxine 50 MICROGram(s) Oral daily  multivitamin 1 Tablet(s) Oral daily  mycophenolate mofetil 500 milliGRAM(s) Oral <User Schedule>  nystatin    Suspension 156387 Unit(s) Oral four times a day  pantoprazole    Tablet 40 milliGRAM(s) Oral <User Schedule>  phenytoin   Capsule 200 milliGRAM(s) Oral two times a day  polyethylene glycol 3350 17 Gram(s) Oral daily PRN  predniSONE   Tablet 10 milliGRAM(s) Oral daily  ramelteon 8 milliGRAM(s) Oral at bedtime  sevelamer carbonate 800 milliGRAM(s) Oral three times a day with meals  sirolimus 7 milliGRAM(s) Oral <User Schedule>  trimethoprim   80 mG/sulfamethoxazole 400 mG 1 Tablet(s) Oral daily  valGANciclovir 450 milliGRAM(s) Oral <User Schedule>  warfarin 5 milliGRAM(s) Oral once    A/P:    S/p complicated hospital course as per HPI  S/p DDRT 4/21/22, required HD, came off HD since 5/5/22, back on HD since 7/27/22  Continue transplant meds per transplant team recommendations  Avoid nephrotoxins  HD MWF as ordered  Epoetin w/HD 3 x week  TMP as able  Renal diet  Anemia, guaiac positive in pt on Coumadin  Await LUCIA cabrera  Will follow    [phone]       S/p stable HD this am, comfortable on RA    Vital Signs Last 24 Hrs  T(C): 36.7 (09-30-22 @ 12:05), Max: 36.9 (09-30-22 @ 05:23)  T(F): 98 (09-30-22 @ 12:05), Max: 98.5 (09-30-22 @ 05:23)  HR: 67 (09-30-22 @ 12:05) (63 - 79)  BP: 128/61 (09-30-22 @ 12:05) (127/51 - 161/67)  RR: 17 (09-30-22 @ 12:05) (15 - 17)  SpO2: 98% (09-30-22 @ 12:05) (96% - 99%)    s1s2  b/l air entry  soft, ND  no edema                                                                       7.2    6.90  )-----------( 314      ( 30 Sep 2022 11:55 )             23.5     30 Sep 2022 09:50    131    |  91     |  87     ----------------------------<  220    4.4     |  25     |  6.18     Ca    8.5        30 Sep 2022 09:50  Phos  4.6       30 Sep 2022 09:50  Mg     1.8       29 Sep 2022 06:45    PT/INR - ( 30 Sep 2022 11:20 )   PT: 30.7 sec;   INR: 2.62 ratio      acetaminophen     Tablet .. 650 milliGRAM(s) Oral every 6 hours PRN  amLODIPine   Tablet 10 milliGRAM(s) Oral daily  artificial  tears Solution 2 Drop(s) Both EYES every 6 hours  brivaracetam 50 milliGRAM(s) Oral <User Schedule>  brivaracetam 50 milliGRAM(s) Oral <User Schedule>  carvedilol 12.5 milliGRAM(s) Oral every 12 hours  chlorhexidine 2% Cloths 1 Application(s) Topical <User Schedule>  dextrose 5%. 1000 milliLiter(s) IV Continuous <Continuous>  dextrose 5%. 1000 milliLiter(s) IV Continuous <Continuous>  dextrose 50% Injectable 25 Gram(s) IV Push once  dextrose 50% Injectable 12.5 Gram(s) IV Push once  dextrose 50% Injectable 25 Gram(s) IV Push once  dextrose Oral Gel 15 Gram(s) Oral once PRN  doxazosin 4 milliGRAM(s) Oral <User Schedule>  epoetin cortney-epbx (RETACRIT) Injectable 78826 Unit(s) IV Push <User Schedule>  finasteride 5 milliGRAM(s) Oral daily  furosemide    Tablet 80 milliGRAM(s) Oral daily  glucagon  Injectable 1 milliGRAM(s) IntraMuscular once  insulin glargine Injectable (LANTUS) 5 Unit(s) SubCutaneous at bedtime  insulin lispro (ADMELOG) corrective regimen sliding scale   SubCutaneous three times a day before meals  insulin lispro (ADMELOG) corrective regimen sliding scale   SubCutaneous at bedtime  insulin lispro Injectable (ADMELOG) 5 Unit(s) SubCutaneous three times a day before meals  levothyroxine 50 MICROGram(s) Oral daily  multivitamin 1 Tablet(s) Oral daily  mycophenolate mofetil 500 milliGRAM(s) Oral <User Schedule>  nystatin    Suspension 366519 Unit(s) Oral four times a day  pantoprazole    Tablet 40 milliGRAM(s) Oral <User Schedule>  phenytoin   Capsule 200 milliGRAM(s) Oral two times a day  polyethylene glycol 3350 17 Gram(s) Oral daily PRN  predniSONE   Tablet 10 milliGRAM(s) Oral daily  ramelteon 8 milliGRAM(s) Oral at bedtime  sevelamer carbonate 800 milliGRAM(s) Oral three times a day with meals  sirolimus 7 milliGRAM(s) Oral <User Schedule>  trimethoprim   80 mG/sulfamethoxazole 400 mG 1 Tablet(s) Oral daily  valGANciclovir 450 milliGRAM(s) Oral <User Schedule>  warfarin 5 milliGRAM(s) Oral once    A/P:    S/p complicated hospital course as per HPI  S/p DDRT 4/21/22, required HD, came off HD since 5/5/22, back on HD since 7/27/22  Continue transplant meds per transplant team recommendations  Avoid nephrotoxins  HD MWF as ordered  Epoetin w/HD 3 x week  TMP as able  Renal diet  Worsening anemia, guaiac positive in pt on Coumadin  Await GI deborah  Will follow    [phone]

## 2022-09-30 NOTE — CONSULT NOTE ADULT - PROBLEM SELECTOR RECOMMENDATION 9
+ FOB  Monitor stool  Monitor h/h  Keep active type and screen  Transfuse < 7 or symptomatic   Hold coumadin, ok to bridge with lovenox  Plan for colonoscopy and upper endoscopy on monday

## 2022-09-30 NOTE — PROGRESS NOTE ADULT - ASSESSMENT
66M with DM2, HTN, CAD with stents/CABG, AF on Coumadin, hx CVA, seizure, PUD, s/p failed renal transplant now back on HD, recent hospitalization for seizures requiring intubation and course complicated by UTI, as well as need for VATS due to large hemothorax, now in rehab     #Seizure disorder: c/w phenytoin and Brivaracetam     #ESRD on HD / failed renal transplant: c/w HD as per renal, c/w immunosuppression, c/w infectious disease prophylaxis    #Anemia of chronic disease: stable, Hg noted 8.4 9/29, c/w EPO    #Chronic AF on Coumadin: goal INR 2-3,  c/w Coreg    #DM2 with steroid-induced hyperglycemia  A1C with Estimated Average Glucose Result: 6.4 %  Admelog 5 units premeal  C/w lantus 5units qhs  C/w hypoglycemia protocol    #Hypothyroid: c/w synthroid    #Right IJ DVT: coumadin tonight    #Urinary retention: c/w Proscar and Doxazosin     #Abdominal wall cellulitis: Completed course of antibiotics    #DVT ppx: Coumadin

## 2022-09-30 NOTE — PROGRESS NOTE ADULT - ASSESSMENT
66 yr old Male with PMHx of DM type II, HTN, CAD s/p stents/ CABG (2020), AFib on Eliquis, CVA (2019) d/t Afib, Seizure d/o following CVA, last episode was on 4/8/22, h/o GIB (2/2022) EGD with duodenal ulcer.  ESRD due to DM was on HD since 2019, s/p premacath removal 5/10/22 s/p Right DDRT (4/21/22) and placed on belatacept.  Frequent hospitalization d/t weakness, refractory sz, and sepsis.  Admitted and intubated 6/10, found to large pleural effusion s/p thoracentesis ~1.3 and VATS of thoracic 2.2 L.      TRANSPLANT PATIENT -  * INR -----2.4 on 9/29, pending 9/30  * Anemia of chronic disease - hgb 7.3 (9/28) -> 8.7 (9/27) -> 7.4 (9/30 - likely from hemodilution prior to HD).  FOBT positive ---> GI consult  * transplant team PHONE  --Iker Alfred (MD) ??  Dr. Eduardo Barber (transplant surgeon) - Darleen Dior NP [phone]*  * Barby MUNOZ - recommendations for labs: BK virus DNA (negative 9/22, 9/29 pending), lactate ( 340 9/22 -> 257 9/29 ), mag (1.9 - 1.8 9/29 ), parathyroid (178 -> 183 9/29), phosphorus ( 3.7 -> 3.8 9/29), CRP (190 -> 200 9/29), uric acid (3.2 -> 3.6 9/29), and UA (oliguria).  CMV pcr (negative 9/22, 9/29 pending)  * iron panel: total iron 13, unsaturated iron 111. total binding 124 and saturation % 11  *Barby MUNOZ emailed in regards to lab monitoring and to review discharge medications ---- Transplant NP esubmit all medications to cfam vivo  * HD M/W/F - need additional brivaracetam after HD  * CXR - stable      COMORBIDITES/ACTIVE MEDICAL ISSUES   Gait Instability, ADL impairments and Functional impairments secondary to recurrent UTI, status epilepticus, with debility seizures. 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  Belatacept d/t 8/1 and was started on Sirolimus 7mg QD  - Prednisone 10  - Cellcept 250 BID on hold  - S/p perma cath placement 8/30/2022  - PPx medications: Nystatin, Bactrim, Valcyte (MWF)  - Nephrology following - modified to [facility]  - Strongyloides Antibodies positive - Ivermectin x1 dose (9/21)    #Anemia of chronic disease  - Has gotten total of 6U PRBC and 2 U FFP during acute hospital   - monitor H/H 8.0 (9/12) - 8.4 (9/23) -> 7.8  (9/26) -> 7.3 (9/28) - 8.7 9/29, 7.4 (9/30 - likely d/t hemodilution)  - FOBT positive --- GI consult pending  - iron panel low -> renal will weigh need of iron infusion - can be outpatient as well     #HTN  #Afib  - Eliquis switched to Coumadin  - Coreg   - Daily INR til therapeutic on a consistent coumadin dose    #Diabetes  - A1C  - Lantus  - standing Admelog dc, restarted 9/16   - FS + ISS  - Hospitalist following   - FS <180 (9/30) stable    #Hypothyroidism  - Synthroid 37.5 mcg    #Pain control  - Tylenol PRN    #GI/Bowel Mgmt   - At risk for constipation due to neurologic diagnosis, immobility and/or medication use  - Senna d/c d/t loose BM 9/11  -  Miralax PRN    #Bladder management  - incontinence     #DVT   - acute DVT to R IJ thrombosis, Superficial vein thrombus to right basilic and cephalic vein  - Coumadin  - SCDs, TEDs     #Skin:  -Stage 2 pressure ulcer to sacrum 0.3 x 0.2- cleanse with NS, apply wet gauze andcover with allevyn foam dressing  -At risk for pressure injury due to neurologic diagnosis and relative immobility  -Bilateral heels - soft heel protectors, apply liquid barrier film daily and monitor for tissue type changes  - Turn Q2 hr while in bed, air mattress  - Skin barrier cream as needed  - Nursing to monitor skin Qshift    Diet  - Regular/easy to chew + Thins  [CCHO, renal diet]    + Fluid Restriction: 1L    Precautions / PROPHYLAXIS:   - Falls, Cardiac, Seizure   - ortho: Weight bearing status: WBAT   - Lungs: Aspiration, Incentive Spirometer   - Pressure injury/Skin: Turn Q2hrs while in bed, OOB to Chair, PT/OT      liaison with family 9/12--Spoke with son  Chris  He gave update on patient's clinical state, investigation results and function prior to acute rehab treatment   I gave update on patients current status, and he was happy with same  Plan to to give interval updates to family as needed    9/22/22--I discussed with his second son (an RN) at patient's bed side  He reports family's plan for home dc, arrangements made to care for patient--his wife is always at home, and sons make time available    IDT 9/26   lives with spouse and 2 sons in  with 6 [facility], [facility] setup.  PTA needed some assistance with ADLs  Functional status: mod A x 1 with stand piovt with and wo RW (needs cutes), mod A to initiate task.  Eating with incidental Assist.  Transfers: sit->stand min A, but mod A + cues with stand to sit, mod A to WC. Ambulated 50' RW min + WCF  Goal: min A/needs hands on A, mod A shower transfer  SLP: discharge as per family   TDD: 9/29 Home need 24/7 assistance.  Family putting in ramp on Wednesday (9/28)    Follow ups:  Andre Patterson)  Clinical Neurophysiology; EEGEpilepsy; Neurology  [address], [address]  [place] [address]  Phone: [phone]  Fax: [phone]  Follow Up Time:     Padmini Lincoln ()  Internal Medicine  [address], [address]  [place] [address]  Phone: [phone]  Fax: [phone]  Follow Up Time:     Iker Alfred)  Internal Medicine; Nephrology  [address] [place] [address]  Phone: [phone]  Fax: [phone]  Follow Up Time:    Dr. Eduardo Barber - Organ Transplant    Iker Alfred  [facility]  NEPHRO [address] [place] [address] D  Scheduled Appointment: 09/20/2022    Iker Alfred  [facility]  NEPHRO [address] [place] [address] D  Scheduled Appointment: 10/20/2022     66 yr old Male with PMHx of DM type II, HTN, CAD s/p stents/ CABG (2020), AFib on Eliquis, CVA (2019) d/t Afib, Seizure d/o following CVA, last episode was on 4/8/22, h/o GIB (2/2022) EGD with duodenal ulcer.  ESRD due to DM was on HD since 2019, s/p premacath removal 5/10/22 s/p Right DDRT (4/21/22) and placed on belatacept.  Frequent hospitalization d/t weakness, refractory sz, and sepsis.  Admitted and intubated 6/10, found to large pleural effusion s/p thoracentesis ~1.3 and VATS of thoracic 2.2 L.      * Neurology consult for alternative med to Briviate (not approved by insurance post discharge),  * FOBT +VE, Due C scope 10/3,  * Stop coumadin, start heparin bridge * NPO midnight Sunday (10/2), C scope Monday 10/3    TRANSPLANT PATIENT -  * INR -----2.4 on 9/29, pending 9/30  * Anemia of chronic disease - hgb 7.3 (9/28) -> 8.7 (9/27) -> 7.4 (9/30 - likely from hemodilution prior to HD).  FOBT positive ---> GI consult  * transplant team PHONE  --Iker Alfred (MD) ??  Dr. Eduardo Barber (transplant surgeon) - Darleen Dior NP [phone]*  * Barby MUNOZ - recommendations for labs: BK virus DNA (negative 9/22, 9/29 pending), lactate ( 340 9/22 -> 257 9/29 ), mag (1.9 - 1.8 9/29 ), parathyroid (178 -> 183 9/29), phosphorus ( 3.7 -> 3.8 9/29), CRP (190 -> 200 9/29), uric acid (3.2 -> 3.6 9/29), and UA (oliguria).  CMV pcr (negative 9/22, 9/29 pending)  * iron panel: total iron 13, unsaturated iron 111. total binding 124 and saturation % 11  *Barby NP emailed in regards to lab monitoring and to review discharge medications ---- Transplant NP esubmit all medications to cfam vivo  * HD M/W/F - need additional brivaracetam after HD  * CXR - stable      COMORBIDITES/ACTIVE MEDICAL ISSUES   Gait Instability, ADL impairments and Functional impairments secondary to recurrent UTI, status epilepticus, with debility seizures. 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  Belatacept d/t 8/1 and was started on Sirolimus 7mg QD  - Prednisone 10  - Cellcept 250 BID on hold  - S/p perma cath placement 8/30/2022  - PPx medications: Nystatin, Bactrim, Valcyte (MWF)  - Nephrology following - modified to [facility]  - Strongyloides Antibodies positive - Ivermectin x1 dose (9/21)    #Anemia of chronic disease  - Has gotten total of 6U PRBC and 2 U FFP during acute hospital   - monitor H/H 8.0 (9/12) - 8.4 (9/23) -> 7.8  (9/26) -> 7.3 (9/28) - 8.7 9/29, 7.4 (9/30 - likely d/t hemodilution)  - FOBT positive --- GI consult pending  - iron panel low -> renal will weigh need of iron infusion - can be outpatient as well     #HTN  #Afib  - Eliquis switched to Coumadin  - Coreg   - Daily INR til therapeutic on a consistent coumadin dose    #Diabetes  - A1C  - Lantus  - standing Admelog dc, restarted 9/16   - FS + ISS  - Hospitalist following   - FS <180 (9/30) stable    #Hypothyroidism  - Synthroid 37.5 mcg    #Pain control  - Tylenol PRN    #GI/Bowel Mgmt   - At risk for constipation due to neurologic diagnosis, immobility and/or medication use  - Senna d/c d/t loose BM 9/11  -  Miralax PRN    #Bladder management  - incontinence     #DVT   - acute DVT to R IJ thrombosis, Superficial vein thrombus to right basilic and cephalic vein  - Coumadin  - SCDs, TEDs     #Skin:  -Stage 2 pressure ulcer to sacrum 0.3 x 0.2- cleanse with NS, apply wet gauze andcover with allevyn foam dressing  -At risk for pressure injury due to neurologic diagnosis and relative immobility  -Bilateral heels - soft heel protectors, apply liquid barrier film daily and monitor for tissue type changes  - Turn Q2 hr while in bed, air mattress  - Skin barrier cream as needed  - Nursing to monitor skin Qshift    Diet  - Regular/easy to chew + Thins  [CCHO, renal diet]    + Fluid Restriction: 1L    Precautions / PROPHYLAXIS:   - Falls, Cardiac, Seizure   - ortho: Weight bearing status: WBAT   - Lungs: Aspiration, Incentive Spirometer   - Pressure injury/Skin: Turn Q2hrs while in bed, OOB to Chair, PT/OT      liaison with family 9/12--Spoke with son  Chris  He gave update on patient's clinical state, investigation results and function prior to acute rehab treatment   I gave update on patients current status, and he was happy with same  Plan to to give interval updates to family as needed    9/22/22--I discussed with his second son (an RN) at patient's bed side  He reports family's plan for home dc, arrangements made to care for patient--his wife is always at home, and sons make time available    IDT 9/26   lives with spouse and 2 sons in  with 6 [facility], [facility] setup.  PTA needed some assistance with ADLs  Functional status: mod A x 1 with stand piovt with and wo RW (needs cutes), mod A to initiate task.  Eating with incidental Assist.  Transfers: sit->stand min A, but mod A + cues with stand to sit, mod A to WC. Ambulated 50' RW min + WCF  Goal: min A/needs hands on A, mod A shower transfer  SLP: discharge as per family   TDD: 9/29 Home need 24/7 assistance.  Family putting in ramp on Wednesday (9/28)    Follow ups:  Andre Patterson)  Clinical Neurophysiology; EEGEpilepsy; Neurology  [address] [facility], [address]  [place] [address]  Phone: [phone]  Fax: [phone]  Follow Up Time:     Padmini Lincoln ()  Internal Medicine  [address], [address]  [place] [address]  Phone: [phone]  Fax: [phone]  Follow Up Time:     Iker Alfred)  Internal Medicine; Nephrology  [address] [place] [address]  Phone: [phone]  Fax: [phone]  Follow Up Time:    Dr. Eduardo Barber - Organ Transplant    Iker Alfred  [facility] Physician [facility]  [address]  Scheduled Appointment: 09/20/2022    Iker Alfred  [facility] Physician [facility]  [address] [facility] [address]  Scheduled Appointment: 10/20/2022

## 2022-09-30 NOTE — DISCHARGE NOTE NURSING/CASE MANAGEMENT/SOCIAL WORK - NSDCVIVACCINE_GEN_ALL_CORE_FT
Tdap; 24-Jun-2021 20:00; Marcela Horn (RN); [employer]; [id number] (Exp. Date: 04-Sep-2022); IntraMuscular; Deltoid Left.; 0.5 milliLiter(s); VIS (VIS Published: 09-May-2013, VIS Presented: 24-Jun-2021);   Tdap; 09-Mar-2022 07:35; Wilson Agrawal (RN); [employer]; [id number] (Exp. Date: 28-Sep-2023); IntraMuscular; Deltoid Left.; 0.5 milliLiter(s); VIS (VIS Published: 09-May-2013, VIS Presented: 09-Mar-2022);

## 2022-09-30 NOTE — CONSULT NOTE ADULT - ASSESSMENT
GI consulted to see patient due to anemia. Patient seen and examined, son present during exam, and second son ( cardiologist) present via phone. Per family patient had EGD in april, chart reviewed and normal. Prior EGD was he was found to have ulceration. Last colonoscopy was 3 years ago and reported to be normal. No BRBPR or melena reported by staff. He is on anticoagulation for afib.

## 2022-09-30 NOTE — PROGRESS NOTE ADULT - SUBJECTIVE AND OBJECTIVE BOX
SUBJECTIVE/ROS:  Patient seen and evaluated sitting up in WC at bedside  Aide supervising breakfast, but he's mostly self feeding  Paient and family made aware of positive FOBT - pending GI consult.  Can still maybe be discharge lateral today  Denies CP, palpitation, SOB, +intermittent cough  No HA, dizziness, or lightheadedness  no nausea or abd pain.  LBM   minimal to none urine output - on HD (HD today)    Vital Signs Last 24 Hrs  T(C): 36.8 (22 @ 09:09), Max: 36.9 (22 @ 05:23)  T(F): 98.2 (22 @ 09:09), Max: 98.5 (22 @ 05:23)  HR: 72 (22 @ 09:09) (63 - 79)  BP: 127/51 (22 @ 09:09) (127/51 - 161/67)  RR: 15 (22 @ 09:09) (15 - 16)  SpO2: 98% (22 @ 09:09) (96% - 99%) on room air     Daily -  Admission weight () 69.8 kg        Daily Weight in k.2 (30 Sep 2022 05:23)    Gen - NAD, comfortable on RA  HEENT - NCAT, EOMI, MMM  Neck - Supple, No limited ROM  Pulm - diminished to bilateral base  Cardiovascular - warm and well perfused  Chest - left chest wall permcath  Abdomen -  distended, but nontender, tympanic  Extremities - No Cyanosis, no clubbing, no edema, no calf tenderness  Neuro-     Cognitive - awake, alert, oriented to person and place. Delayed processing/response     Cranial Nerves - CN 2-12 intact. No facial asymmetry, Tongue midline, EOMI, Shoulder shrug intact     Motor -                     LEFT    UE - 3+5                    RIGHT UE - 3-/5                    LEFT    LE - KE 4/5                    RIGHT LE - KE 4/5        Sensory - Intact to LT bilaterally  MSK: generalized weakness  Skin:  stage 2 pressure ulcer to sacrum 0.3 x 0.2,  discoloration to lateral RLQ of abdomen    RECENT LABS:        Uric Acid, Serum: 3.6 mg/dL (22 @ 06:45)   C-Reactive Protein, Serum: 200 mg/L (22 @ 06:45)   Phosphorus Level, Serum: 3.8 mg/dL (22 @ 06:45)   Intact PTH: 183  Lactate Dehydrogenase, Serum: 257 U/L (22 @ 06:45)   Magnesium, Serum: 1.8 mg/dL (22 @ 06:45)                 7.4    6.34  )-----------( 301      ( 30 Sep 2022 09:50 )             24.2         131<L>  |  91<L>  |  87<H>  ----------------------------<  220<H>  4.4   |  25  |  6.18<H>    Ca    8.5      30 Sep 2022 09:50  Phos  4.6       Mg     1.8         TPro  6.6  /  Alb  1.9<L>  /  TBili  0.3  /  DBili  x   /  AST  28  /  ALT  32  /  AlkPhos  669<H>      LIVER FUNCTIONS - ( 28 Sep 2022 14:45 )  Alb: 1.9 g/dL / Pro: 6.6 g/dL / ALK PHOS: 669 U/L / ALT: 32 U/L / AST: 28 U/L / GGT: x           PT/INR - ( 29 Sep 2022 12:20 )   PT: 28.4 sec;   INR: 2.43 ratio  - pending for     FOBT - positive ()    < from: Xray Chest 1 View-PORTABLE IMMEDIATE (Xray Chest 1 View-PORTABLE IMMEDIATE .) (22 @ 12:26) >  Decreasing bilateral perihilar mild diffuse airspace   disease.. No pneumothorax. No large effusion.    Xray Chest 1 View- PORTABLE-Routine (Xray Chest 1 View- PORTABLE-Routine .) (22 @ 12:04) >  Interval exchange of the dialysis catheter. No evidence of pneumothorax.   No other significant change.    Allergies  No Known Allergies    MEDICATIONS  (STANDING):  amLODIPine   Tablet 10 milliGRAM(s) Oral daily  artificial  tears Solution 2 Drop(s) Both EYES every 6 hours  brivaracetam 50 milliGRAM(s) Oral <User Schedule>  brivaracetam 50 milliGRAM(s) Oral <User Schedule>  carvedilol 12.5 milliGRAM(s) Oral every 12 hours  chlorhexidine 2% Cloths 1 Application(s) Topical <User Schedule>  dextrose 5%. 1000 milliLiter(s) (100 mL/Hr) IV Continuous <Continuous>  dextrose 5%. 1000 milliLiter(s) (50 mL/Hr) IV Continuous <Continuous>  dextrose 50% Injectable 25 Gram(s) IV Push once  dextrose 50% Injectable 12.5 Gram(s) IV Push once  dextrose 50% Injectable 25 Gram(s) IV Push once  doxazosin 4 milliGRAM(s) Oral <User Schedule>  epoetin cortney-epbx (RETACRIT) Injectable 04195 Unit(s) IV Push <User Schedule>  finasteride 5 milliGRAM(s) Oral daily  furosemide    Tablet 80 milliGRAM(s) Oral daily  glucagon  Injectable 1 milliGRAM(s) IntraMuscular once  insulin glargine Injectable (LANTUS) 5 Unit(s) SubCutaneous at bedtime  insulin lispro (ADMELOG) corrective regimen sliding scale   SubCutaneous three times a day before meals  insulin lispro (ADMELOG) corrective regimen sliding scale   SubCutaneous at bedtime  insulin lispro Injectable (ADMELOG) 5 Unit(s) SubCutaneous three times a day before meals  levothyroxine 50 MICROGram(s) Oral daily  multivitamin 1 Tablet(s) Oral daily  mycophenolate mofetil 500 milliGRAM(s) Oral <User Schedule>  nystatin    Suspension 232384 Unit(s) Oral four times a day  pantoprazole    Tablet 40 milliGRAM(s) Oral <User Schedule>  phenytoin   Capsule 200 milliGRAM(s) Oral two times a day  predniSONE   Tablet 10 milliGRAM(s) Oral daily  ramelteon 8 milliGRAM(s) Oral at bedtime  sevelamer carbonate 800 milliGRAM(s) Oral three times a day with meals  sirolimus 7 milliGRAM(s) Oral <User Schedule>  trimethoprim   80 mG/sulfamethoxazole 400 mG 1 Tablet(s) Oral daily  valGANciclovir 450 milliGRAM(s) Oral <User Schedule>    MEDICATIONS  (PRN):  acetaminophen     Tablet .. 650 milliGRAM(s) Oral every 6 hours PRN Temp greater or equal to 38C (100.4F), Mild Pain (1 - 3)  dextrose Oral Gel 15 Gram(s) Oral once PRN Blood Glucose LESS THAN 70 milliGRAM(s)/deciliter  polyethylene glycol 3350 17 Gram(s) Oral daily PRN Constipation

## 2022-10-01 NOTE — PROGRESS NOTE ADULT - SUBJECTIVE AND OBJECTIVE BOX
Patient is a 66y old  Male who presents with a chief complaint of Debility (01 Oct 2022 12:28)      Patient seen and examined at bedside. No events overnight. Patient for HD this morning. Denies any acute complaints, no chest pain, sob, abd pain.    ALLERGIES:  No Known Allergies    MEDICATIONS  (STANDING):  amLODIPine   Tablet 10 milliGRAM(s) Oral daily  artificial  tears Solution 2 Drop(s) Both EYES every 6 hours  brivaracetam 50 milliGRAM(s) Oral <User Schedule>  brivaracetam 50 milliGRAM(s) Oral <User Schedule>  carvedilol 12.5 milliGRAM(s) Oral every 12 hours  chlorhexidine 2% Cloths 1 Application(s) Topical <User Schedule>  dextrose 5%. 1000 milliLiter(s) (100 mL/Hr) IV Continuous <Continuous>  dextrose 5%. 1000 milliLiter(s) (50 mL/Hr) IV Continuous <Continuous>  dextrose 50% Injectable 25 Gram(s) IV Push once  dextrose 50% Injectable 12.5 Gram(s) IV Push once  dextrose 50% Injectable 25 Gram(s) IV Push once  doxazosin 4 milliGRAM(s) Oral <User Schedule>  epoetin cortney-epbx (RETACRIT) Injectable 05660 Unit(s) IV Push <User Schedule>  finasteride 5 milliGRAM(s) Oral daily  furosemide    Tablet 80 milliGRAM(s) Oral daily  glucagon  Injectable 1 milliGRAM(s) IntraMuscular once  insulin glargine Injectable (LANTUS) 5 Unit(s) SubCutaneous at bedtime  insulin lispro (ADMELOG) corrective regimen sliding scale   SubCutaneous three times a day before meals  insulin lispro (ADMELOG) corrective regimen sliding scale   SubCutaneous at bedtime  insulin lispro Injectable (ADMELOG) 5 Unit(s) SubCutaneous three times a day before meals  levothyroxine 50 MICROGram(s) Oral daily  multivitamin 1 Tablet(s) Oral daily  mycophenolate mofetil 500 milliGRAM(s) Oral <User Schedule>  nystatin    Suspension 408626 Unit(s) Oral four times a day  pantoprazole    Tablet 40 milliGRAM(s) Oral <User Schedule>  phenytoin   Capsule 200 milliGRAM(s) Oral two times a day  predniSONE   Tablet 10 milliGRAM(s) Oral daily  ramelteon 8 milliGRAM(s) Oral at bedtime  sevelamer carbonate 800 milliGRAM(s) Oral three times a day with meals  sirolimus 7 milliGRAM(s) Oral <User Schedule>  trimethoprim   80 mG/sulfamethoxazole 400 mG 1 Tablet(s) Oral daily  valGANciclovir 450 milliGRAM(s) Oral <User Schedule>    MEDICATIONS  (PRN):  acetaminophen     Tablet .. 650 milliGRAM(s) Oral every 6 hours PRN Temp greater or equal to 38C (100.4F), Mild Pain (1 - 3)  dextrose Oral Gel 15 Gram(s) Oral once PRN Blood Glucose LESS THAN 70 milliGRAM(s)/deciliter  polyethylene glycol 3350 17 Gram(s) Oral daily PRN Constipation    Vital Signs Last 24 Hrs  T(F): 97.7 (01 Oct 2022 09:50), Max: 98.2 (01 Oct 2022 08:00)  HR: 57 (01 Oct 2022 09:50) (57 - 61)  BP: 113/59 (01 Oct 2022 09:50) (113/59 - 159/65)  RR: 16 (01 Oct 2022 09:50) (16 - 16)  SpO2: 97% (01 Oct 2022 09:50) (93% - 97%)  I&O's Summary    30 Sep 2022 07:01  -  01 Oct 2022 07:00  --------------------------------------------------------  IN: 800 mL / OUT: 3300 mL / NET: -2500 mL      PHYSICAL EXAM:  General: NAD, laying in bed  ENT: MMM, no scleral icterus  Neck: Supple, No JVD, no thyroidomegaly  Lungs: Clear to auscultation bilaterally, no wheezes, no rales, no rhonchi, good inspiratory effort  Cardio: RRR, S1/S2, No murmurs  Abdomen: Soft, Nontender, Nondistended; Bowel sounds present  Extremities: No calf tenderness, No pitting edema, no skin changes    LABS:                        7.4    4.92  )-----------( 301      ( 01 Oct 2022 06:35 )             24.1     09-30    131  |  91  |  87  ----------------------------<  220  4.4   |  25  |  6.18    Ca    8.5      30 Sep 2022 09:50  Phos  4.6     09-30  Mg     1.8     09-29    TPro  6.6  /  Alb  1.9  /  TBili  0.3  /  DBili  x   /  AST  28  /  ALT  32  /  AlkPhos  669  09-28      PT/INR - ( 01 Oct 2022 10:00 )   PT: 27.5 sec;   INR: 2.35 ratio         PTT - ( 01 Oct 2022 10:00 )  PTT:41.1 sec        07-27 Chol -- LDL -- HDL -- Trig 118 mg/dL              POCT Blood Glucose.: 229 mg/dL (01 Oct 2022 09:06)  POCT Blood Glucose.: 234 mg/dL (30 Sep 2022 21:38)  POCT Blood Glucose.: 80 mg/dL (30 Sep 2022 16:59)          COVID-19 PCR: NotDetec (09-27-22 @ 08:00)  COVID-19 PCR: NotDetec (09-21-22 @ 05:45)  COVID-19 PCR: NotDetec (09-15-22 @ 05:41)  COVID-19 PCR: NotDetec (09-09-22 @ 07:43)  COVID-19 PCR: NotDetec (09-07-22 @ 18:29)      RADIOLOGY & ADDITIONAL TESTS:    Care Discussed with Consultants/Other Providers:

## 2022-10-01 NOTE — PROGRESS NOTE ADULT - ASSESSMENT
66M with DM2, HTN, CAD with stents/CABG, AF on Coumadin, hx CVA, seizure, PUD, s/p failed renal transplant now back on HD, recent hospitalization for seizures requiring intubation and course complicated by UTI, as well as need for VATS due to large hemothorax, now in rehab     #Seizure disorder  - c/w phenytoin and Brivaracetam   - neuro consulted, Dr. Deal    #ESRD on HD / failed renal transplant  - c/w HD as per renal  - c/w immunosuppression  - c/w infectious disease prophylaxis    #Anemia of chronic disease  - s/p 1U PRBC on 10/1 with HD  - keep active type and screen  - c/w EPO  - FOBT positive, GI recs appreciated, plan for EGD/colonoscopy on Monday  - hold coumadin for now, INR today therapeutic therefore can avoid heparin gtt for today, follow up INR tomorrow and if less than 2 will need to start heparin gtt until scopes performed    #Chronic AF   - on Coumadin: goal INR 2-3, held for now in setting of colonosocpy/EGD on Monday  - c/w Coreg    #DM2 with steroid-induced hyperglycemia  - A1C with Estimated Average Glucose Result: 6.4 %  - Admelog 5 units premeal  - C/w lantus 5units qhs  - C/w hypoglycemia protocol    #Hypothyroid  - c/w synthroid    #Right IJ DVT  - coumadin    #Urinary retention  - c/w Proscar and Doxazosin     #Abdominal wall cellulitis  - Completed course of antibiotics    #DVT ppx:  - Coumadin

## 2022-10-01 NOTE — CONSULT NOTE ADULT - SUBJECTIVE AND OBJECTIVE BOX
General Neurology  Consult Note    HPI:  Srinivasan Castelan is a  General Neurology  Consult Note    HPI:  Srinivasan Castelan is a 66 yr old Male with PMHx of DM type II, HTN, CAD s/p stents/ CABG (2020), AFib on Eliquis, CVA (2019) d/t Afib, Seizure d/o following CVA, last episode was on 4/8/22, h/o GIB (2/2022) EGD with duodenal ulcer.  ESRD due to DM was on HD since 2019, s/p premacath removal 5/10/22 s/p Right DDRT (4/21/22) and placed on belatacept.  Recent hospitalization 4/28/22 -5/10/22 with weakness/anemia found to have perinephric hematoma requiring evacuation and repair of bleeding arterial anastomosis. Was discharged to [facility], currently being treated for UTI with Levaquin, developed multiple sz episodes refractory to 5 mg versed IM (EMS) and 2 mg ativan IV in E.D. concerning for status epilepticus requiring intubation for hypoxic respiratory failure. MICU Consult was called for hypoxic respiratory failure secondary to status epilepticus. Was recently dx with klebsiella UTI - started with ertapenem which was then switched to Levaquin. He also had Parainfluenza PNA.      The patient was transferred to  Rehab on 9/8/22.  Neurology was consulted for seizure medication recommendations in the event that his Briviact is not approved again. Approval obtained for one month.    History obtained from sons at bedside, one is a physician.   They note that the patient has been stable on his current regimen and they wish to continue if possible. The patient has an appointment with Dr. Patterson in November. At present, the family does not have any other concerns regarding his medication management.  The patient has not had another seizure since August. No side effects from the current medication regimen.   Phenytoin level last checked 9/8/22.

## 2022-10-01 NOTE — CONSULT NOTE ADULT - MOTOR
5/5 strength in the left upper and lower extremities, right lower extremity.  4/5 weakness in the right upper extremity, with mild spasticity at the elbow and moderate at the wrist.

## 2022-10-01 NOTE — PROGRESS NOTE ADULT - SUBJECTIVE AND OBJECTIVE BOX
S/p stable HD this am, comfortable on RA    Vital Signs Last 24 Hrs  T(C): 36.4 (10-01-22 @ 20:01), Max: 36.8 (10-01-22 @ 08:00)  T(F): 97.5 (10-01-22 @ 20:01), Max: 98.2 (10-01-22 @ 08:00)  HR: 66 (10-01-22 @ 20:01) (57 - 66)  BP: 136/58 (10-01-22 @ 20:01) (113/59 - 159/65)  RR: 16 (10-01-22 @ 20:01) (16 - 16)  SpO2: 96% (10-01-22 @ 20:01) (93% - 97%)    I&O's Detail    30 Sep 2022 07:01  -  01 Oct 2022 07:00  --------------------------------------------------------  IN:    Other (mL): 800 mL  Total IN: 800 mL    OUT:    Other (mL): 3300 mL  Total OUT: 3300 mL    Total NET: -2500 mL    01 Oct 2022 07:01  -  01 Oct 2022 23:41  --------------------------------------------------------  OUT:    Other (mL): 1500 mL  Total OUT: 1500 mL    s1s2  b/l air entry  soft, ND  no edema                                                                              7.4    4.92  )-----------( 301      ( 01 Oct 2022 06:35 )             24.1     30 Sep 2022 09:50    131    |  91     |  87     ----------------------------<  220    4.4     |  25     |  6.18     Ca    8.5        30 Sep 2022 09:50  Phos  4.6       30 Sep 2022 09:50    PT/INR - ( 01 Oct 2022 10:00 )   PT: 27.5 sec;   INR: 2.35 ratio      acetaminophen     Tablet .. 650 milliGRAM(s) Oral every 6 hours PRN  amLODIPine   Tablet 10 milliGRAM(s) Oral daily  artificial  tears Solution 2 Drop(s) Both EYES every 6 hours  brivaracetam 50 milliGRAM(s) Oral <User Schedule>  brivaracetam 50 milliGRAM(s) Oral <User Schedule>  carvedilol 12.5 milliGRAM(s) Oral every 12 hours  chlorhexidine 2% Cloths 1 Application(s) Topical <User Schedule>  dextrose 5%. 1000 milliLiter(s) IV Continuous <Continuous>  dextrose 5%. 1000 milliLiter(s) IV Continuous <Continuous>  dextrose 50% Injectable 25 Gram(s) IV Push once  dextrose 50% Injectable 12.5 Gram(s) IV Push once  dextrose 50% Injectable 25 Gram(s) IV Push once  dextrose Oral Gel 15 Gram(s) Oral once PRN  doxazosin 4 milliGRAM(s) Oral <User Schedule>  epoetin cortney-epbx (RETACRIT) Injectable 20280 Unit(s) IV Push <User Schedule>  finasteride 5 milliGRAM(s) Oral daily  furosemide    Tablet 80 milliGRAM(s) Oral daily  glucagon  Injectable 1 milliGRAM(s) IntraMuscular once  insulin glargine Injectable (LANTUS) 5 Unit(s) SubCutaneous at bedtime  insulin lispro (ADMELOG) corrective regimen sliding scale   SubCutaneous three times a day before meals  insulin lispro (ADMELOG) corrective regimen sliding scale   SubCutaneous at bedtime  insulin lispro Injectable (ADMELOG) 5 Unit(s) SubCutaneous three times a day before meals  levothyroxine 50 MICROGram(s) Oral daily  multivitamin 1 Tablet(s) Oral daily  mycophenolate mofetil 500 milliGRAM(s) Oral <User Schedule>  nystatin    Suspension 481049 Unit(s) Oral four times a day  pantoprazole    Tablet 40 milliGRAM(s) Oral <User Schedule>  phenytoin   Capsule 200 milliGRAM(s) Oral two times a day  polyethylene glycol 3350 17 Gram(s) Oral daily PRN  predniSONE   Tablet 10 milliGRAM(s) Oral daily  ramelteon 8 milliGRAM(s) Oral at bedtime  sevelamer carbonate 800 milliGRAM(s) Oral three times a day with meals  sirolimus 7 milliGRAM(s) Oral <User Schedule>  trimethoprim   80 mG/sulfamethoxazole 400 mG 1 Tablet(s) Oral daily  valGANciclovir 450 milliGRAM(s) Oral <User Schedule>    A/P:    S/p complicated hospital course as per HPI  S/p DDRT 4/21/22, required HD, came off HD since 5/5/22, back on HD since 7/27/22  Continue transplant meds per transplant team recommendations  Avoid nephrotoxins  Epoetin w/HD 3 x week  TMP as able  Renal diet  Worsening anemia, guaiac positive in pt on Coumadin  For EGD/colon on Monday per GI   Bld tx w/HD today  Next HD on Tuesday    [phone]

## 2022-10-01 NOTE — PROGRESS NOTE ADULT - SUBJECTIVE AND OBJECTIVE BOX
Cc: Gait dysfunction    HPI: Patient seen and examined at bedside while on dialysis. No acute events overnight.   Pain controlled, no chest pain, no N/V, no Fevers/Chills. No other new ROS  Has been tolerating rehabilitation program.    acetaminophen     Tablet .. 650 milliGRAM(s) Oral every 6 hours PRN  amLODIPine   Tablet 10 milliGRAM(s) Oral daily  artificial  tears Solution 2 Drop(s) Both EYES every 6 hours  brivaracetam 50 milliGRAM(s) Oral <User Schedule>  brivaracetam 50 milliGRAM(s) Oral <User Schedule>  carvedilol 12.5 milliGRAM(s) Oral every 12 hours  chlorhexidine 2% Cloths 1 Application(s) Topical <User Schedule>  dextrose 5%. 1000 milliLiter(s) IV Continuous <Continuous>  dextrose 5%. 1000 milliLiter(s) IV Continuous <Continuous>  dextrose 50% Injectable 25 Gram(s) IV Push once  dextrose 50% Injectable 12.5 Gram(s) IV Push once  dextrose 50% Injectable 25 Gram(s) IV Push once  dextrose Oral Gel 15 Gram(s) Oral once PRN  doxazosin 4 milliGRAM(s) Oral <User Schedule>  epoetin cortney-epbx (RETACRIT) Injectable 82461 Unit(s) IV Push <User Schedule>  finasteride 5 milliGRAM(s) Oral daily  furosemide    Tablet 80 milliGRAM(s) Oral daily  glucagon  Injectable 1 milliGRAM(s) IntraMuscular once  insulin glargine Injectable (LANTUS) 5 Unit(s) SubCutaneous at bedtime  insulin lispro (ADMELOG) corrective regimen sliding scale   SubCutaneous three times a day before meals  insulin lispro (ADMELOG) corrective regimen sliding scale   SubCutaneous at bedtime  insulin lispro Injectable (ADMELOG) 5 Unit(s) SubCutaneous three times a day before meals  levothyroxine 50 MICROGram(s) Oral daily  multivitamin 1 Tablet(s) Oral daily  mycophenolate mofetil 500 milliGRAM(s) Oral <User Schedule>  nystatin    Suspension 293078 Unit(s) Oral four times a day  pantoprazole    Tablet 40 milliGRAM(s) Oral <User Schedule>  phenytoin   Capsule 200 milliGRAM(s) Oral two times a day  polyethylene glycol 3350 17 Gram(s) Oral daily PRN  predniSONE   Tablet 10 milliGRAM(s) Oral daily  ramelteon 8 milliGRAM(s) Oral at bedtime  sevelamer carbonate 800 milliGRAM(s) Oral three times a day with meals  sirolimus 7 milliGRAM(s) Oral <User Schedule>  trimethoprim   80 mG/sulfamethoxazole 400 mG 1 Tablet(s) Oral daily  valGANciclovir 450 milliGRAM(s) Oral <User Schedule>      T(C): 36.4 (10-01-22 @ 12:05), Max: 36.8 (10-01-22 @ 08:00)  HR: 58 (10-01-22 @ 12:05) (57 - 61)  BP: 130/56 (10-01-22 @ 12:05) (113/59 - 159/65)  RR: 16 (10-01-22 @ 12:05) (16 - 16)  SpO2: 97% (10-01-22 @ 12:05) (93% - 97%)    In NAD, on hemodialysis  HEENT- EOMI  Heart- S1S2  Lungs- CTA bl.  Abd- + BS, NT  Ext- No calf pain  Neuro- Exam unchanged        Imp: Patient with diagnosis of debility, seizures, UTI, hemothorax s/p VATS admitted for comprehensive acute rehabilitation.    Plan:  - Continue PT/OT/SLP as indicated  - DVT prophylaxis  - Skin- Turn q2h, check skin daily  - Continue current medications  -Active issues-   Seizure disorder - awaiting neuro consult recs for medication changes  +FOBT/Anemia - pending colonoscopy tomorrow, holding coumadin for now, s/p 1 U [facility] on 10/1  DM2 - continue current meds  Urinary retention - continue proscar and doxazosin  - Patient is stable to continue current rehabilitation program.

## 2022-10-01 NOTE — CONSULT NOTE ADULT - ASSESSMENT
Srinivasan Castelan is a 66 yr old Male with PMHx of DM type II, HTN, CAD s/p stents/ CABG (2020), AFib on Eliquis, CVA (2019) d/t Afib, Seizure d/o following CVA, last episode was on 4/8/22, h/o GIB (2/2022) EGD with duodenal ulcer.     The patient has been stable on his current seizure regimen of Briviact and Dilantin. No medication changes are recommended at this time. Patient assistance can be inquired regarding grants that may be available for Briviact. His family would like to wait until their appointment with Dr. Patterson to consider any other medication changes, which is reasonable. Otherwise, they report patient has been recovering well. No new localizing neurological deficits noted on examination.    Remainder of care per primary team.    Thank you for the consult. Neurology will sign off.

## 2022-10-02 NOTE — PROGRESS NOTE ADULT - SUBJECTIVE AND OBJECTIVE BOX
No events.   No dark bm's or rectal bleeding.      Review of Systems:    General:  No wt loss, fevers, chills, night sweats,fatigue,   CV:  No pain, palpitatioins, hypo/hypertension  Resp:  No dyspnea, cough, tachypnea, wheezing  GI:  No pain, No nausea, No vomiting, No diarrhea, No constipatiion, No weight loss, No fever, No pruritis, No rectal bleeding, No tarry stools, No dysphagia,  :  No pain, bleeding, incontinence, nocturia  Muscle:  No pain, weakness  Neuro:  No weakness, tingling, memory problems  Psych:  No fatigue, insomnia, mood problems, depression  Endocrine:  No polyuria, polydypsia, cold/heat intolerance  Heme:  No petechiae, ecchymosis, easy bruisability  Skin:  No rash, tattoos, scars, edema      Vital Signs Last 24 Hrs  T(C): 36.4 (02 Oct 2022 08:27), Max: 36.4 (01 Oct 2022 20:01)  T(F): 97.5 (02 Oct 2022 08:27), Max: 97.5 (01 Oct 2022 20:01)  HR: 54 (02 Oct 2022 08:27) (54 - 66)  BP: 158/70 (02 Oct 2022 08:27) (129/67 - 158/70)  BP(mean): --  RR: 16 (02 Oct 2022 08:27) (16 - 16)  SpO2: 98% (02 Oct 2022 08:27) (96% - 98%)    Parameters below as of 02 Oct 2022 08:27  Patient On (Oxygen Delivery Method): room air        PHYSICAL EXAM:    Constitutional: NAD, well-developed  Neck: No LAD, supple  Respiratory: CTA and P  Cardiovascular: S1 and S2, RRR, no M  Gastrointestinal: BS+, soft, NT/ND, neg HSM,  Extremities: No peripheral edema, neg clubing, cyanosis  Vascular: 2+ peripheral pulses  Neurological: A/O x 3, no focal deficits  Psychiatric: Normal mood, normal affect  Skin: No rashes    MEDICATIONS  (STANDING):  amLODIPine   Tablet 10 milliGRAM(s) Oral daily  artificial  tears Solution 2 Drop(s) Both EYES every 6 hours  brivaracetam 50 milliGRAM(s) Oral <User Schedule>  brivaracetam 50 milliGRAM(s) Oral <User Schedule>  carvedilol 12.5 milliGRAM(s) Oral every 12 hours  chlorhexidine 2% Cloths 1 Application(s) Topical <User Schedule>  dextrose 5%. 1000 milliLiter(s) (50 mL/Hr) IV Continuous <Continuous>  dextrose 5%. 1000 milliLiter(s) (100 mL/Hr) IV Continuous <Continuous>  dextrose 50% Injectable 25 Gram(s) IV Push once  dextrose 50% Injectable 12.5 Gram(s) IV Push once  dextrose 50% Injectable 25 Gram(s) IV Push once  doxazosin 4 milliGRAM(s) Oral <User Schedule>  epoetin cortney-epbx (RETACRIT) Injectable 23370 Unit(s) IV Push <User Schedule>  finasteride 5 milliGRAM(s) Oral daily  furosemide    Tablet 80 milliGRAM(s) Oral daily  glucagon  Injectable 1 milliGRAM(s) IntraMuscular once  insulin glargine Injectable (LANTUS) 5 Unit(s) SubCutaneous at bedtime  insulin lispro (ADMELOG) corrective regimen sliding scale   SubCutaneous three times a day before meals  insulin lispro (ADMELOG) corrective regimen sliding scale   SubCutaneous at bedtime  insulin lispro Injectable (ADMELOG) 5 Unit(s) SubCutaneous three times a day before meals  levothyroxine 50 MICROGram(s) Oral daily  multivitamin 1 Tablet(s) Oral daily  mycophenolate mofetil 500 milliGRAM(s) Oral <User Schedule>  nystatin    Suspension 328830 Unit(s) Oral four times a day  pantoprazole    Tablet 40 milliGRAM(s) Oral <User Schedule>  phenytoin   Capsule 200 milliGRAM(s) Oral two times a day  polyethylene glycol/electrolyte Solution      polyethylene glycol/electrolyte Solution 1000 milliLiter(s) Oral once  polyethylene glycol/electrolyte Solution 1000 milliLiter(s) Oral once  predniSONE   Tablet 10 milliGRAM(s) Oral daily  ramelteon 8 milliGRAM(s) Oral at bedtime  sevelamer carbonate 800 milliGRAM(s) Oral three times a day with meals  sirolimus 7 milliGRAM(s) Oral <User Schedule>  trimethoprim   80 mG/sulfamethoxazole 400 mG 1 Tablet(s) Oral daily  valGANciclovir 450 milliGRAM(s) Oral <User Schedule>    MEDICATIONS  (PRN):  acetaminophen     Tablet .. 650 milliGRAM(s) Oral every 6 hours PRN Temp greater or equal to 38C (100.4F), Mild Pain (1 - 3)  dextrose Oral Gel 15 Gram(s) Oral once PRN Blood Glucose LESS THAN 70 milliGRAM(s)/deciliter  polyethylene glycol 3350 17 Gram(s) Oral daily PRN Constipation      Allergies    No Known Allergies    Intolerances          LABS:                        8.4    4.21  )-----------( 335      ( 02 Oct 2022 06:45 )             27.7                         7.4    4.92  )-----------( 301      ( 01 Oct 2022 06:35 )             24.1                         7.2    6.90  )-----------( 314      ( 30 Sep 2022 11:55 )             23.5           PT/INR - ( 02 Oct 2022 06:45 )   PT: 21.2 sec;   INR: 1.82 ratio         PTT - ( 02 Oct 2022 06:45 )  PTT:38.7 sec          RADIOLOGY & ADDITIONAL TESTS:

## 2022-10-02 NOTE — PROGRESS NOTE ADULT - SUBJECTIVE AND OBJECTIVE BOX
Cc: Gait dysfunction    HPI: Patient seen and examined at bedside. No acute events overnight.   Pain controlled, no chest pain, no N/V, no Fevers/Chills. No other new ROS  Has been tolerating rehabilitation program.    acetaminophen     Tablet .. 650 milliGRAM(s) Oral every 6 hours PRN  amLODIPine   Tablet 10 milliGRAM(s) Oral daily  artificial  tears Solution 2 Drop(s) Both EYES every 6 hours  brivaracetam 50 milliGRAM(s) Oral <User Schedule>  brivaracetam 50 milliGRAM(s) Oral <User Schedule>  carvedilol 12.5 milliGRAM(s) Oral every 12 hours  chlorhexidine 2% Cloths 1 Application(s) Topical <User Schedule>  dextrose 5%. 1000 milliLiter(s) IV Continuous <Continuous>  dextrose 5%. 1000 milliLiter(s) IV Continuous <Continuous>  dextrose 50% Injectable 25 Gram(s) IV Push once  dextrose 50% Injectable 12.5 Gram(s) IV Push once  dextrose 50% Injectable 25 Gram(s) IV Push once  dextrose Oral Gel 15 Gram(s) Oral once PRN  doxazosin 4 milliGRAM(s) Oral <User Schedule>  epoetin cortney-epbx (RETACRIT) Injectable 98830 Unit(s) IV Push <User Schedule>  finasteride 5 milliGRAM(s) Oral daily  furosemide    Tablet 80 milliGRAM(s) Oral daily  glucagon  Injectable 1 milliGRAM(s) IntraMuscular once  heparin   Injectable 5000 Unit(s) SubCutaneous every 12 hours  insulin glargine Injectable (LANTUS) 5 Unit(s) SubCutaneous at bedtime  insulin lispro (ADMELOG) corrective regimen sliding scale   SubCutaneous three times a day before meals  insulin lispro (ADMELOG) corrective regimen sliding scale   SubCutaneous at bedtime  insulin lispro Injectable (ADMELOG) 5 Unit(s) SubCutaneous three times a day before meals  levothyroxine 50 MICROGram(s) Oral daily  multivitamin 1 Tablet(s) Oral daily  mycophenolate mofetil 500 milliGRAM(s) Oral <User Schedule>  nystatin    Suspension 831359 Unit(s) Oral four times a day  pantoprazole    Tablet 40 milliGRAM(s) Oral <User Schedule>  phenytoin   Capsule 200 milliGRAM(s) Oral two times a day  polyethylene glycol 3350 17 Gram(s) Oral daily PRN  predniSONE   Tablet 10 milliGRAM(s) Oral daily  ramelteon 8 milliGRAM(s) Oral at bedtime  sevelamer carbonate 800 milliGRAM(s) Oral three times a day with meals  sirolimus 7 milliGRAM(s) Oral <User Schedule>  trimethoprim   80 mG/sulfamethoxazole 400 mG 1 Tablet(s) Oral daily  valGANciclovir 450 milliGRAM(s) Oral <User Schedule>      T(C): 36.4 (10-02-22 @ 08:27), Max: 36.4 (10-01-22 @ 20:01)  HR: 54 (10-02-22 @ 08:27) (54 - 66)  BP: 158/70 (10-02-22 @ 08:27) (129/67 - 158/70)  RR: 16 (10-02-22 @ 08:27) (16 - 16)  SpO2: 98% (10-02-22 @ 08:27) (96% - 98%)    In NAD  HEENT- EOMI  Heart- S1S2  Lungs- CTA bl.  Abd- + BS, NT  Ext- No calf pain  Neuro- Exam unchanged      Imp: Patient with diagnosis of debility, seizures, UTI, hemothorax s/p VATS admitted for comprehensive acute rehabilitation.    Plan:  - Continue PT/OT/SLP as indicated  - DVT prophylaxis  - Skin- Turn q2h, check skin daily  - Continue current medications  -Active issues-   Seizure disorder - appreciate neuro recs, will keep current medication regimen  +FOBT/Anemia - pending colonoscopy tomorrow, holding coumadin for now, INR 1.86,  s/p 1 U [facility] on 10/1  DM2 - continue current meds  Urinary retention - continue proscar and doxazosin  - Patient is stable to continue current rehabilitation program.

## 2022-10-02 NOTE — PROGRESS NOTE ADULT - ASSESSMENT
66M with DM2, HTN, CAD with stents/CABG, AF on Coumadin, hx CVA, seizure, PUD, s/p failed renal transplant now back on HD, recent hospitalization for seizures requiring intubation and course complicated by UTI, as well as need for VATS due to large hemothorax, now in rehab     #Seizure disorder  - c/w phenytoin and Brivaracetam   - neuro consulted, Dr. Deal    #ESRD on HD / failed renal transplant  - c/w HD as per renal  - c/w immunosuppression  - c/w infectious disease prophylaxis    #Anemia of chronic disease  - s/p 1U PRBC on 10/1 with HD  - keep active type and screen  - c/w EPO  - FOBT positive, GI recs appreciated, plan for EGD/colonoscopy on Monday  - hold coumadin for now, INR today noted to be 1.86. Continue current treatment and will restart coumadin once cleared by GI    #Chronic AF   - on Coumadin: goal INR 2-3, held for now in setting of colonosocpy/EGD on Monday  - c/w Coreg    #DM2 with steroid-induced hyperglycemia  - A1C with Estimated Average Glucose Result: 6.4 %  - Admelog 5 units premeal  - C/w lantus 5units qhs  - C/w hypoglycemia protocol    #Hypothyroid  - c/w synthroid    #Right IJ DVT  - coumadin, held for scopes    #Urinary retention  - c/w Proscar and Doxazosin     #Abdominal wall cellulitis  - Completed course of antibiotics    #DVT ppx:  - Coumadin held for scopes  - heparin 5000U BID

## 2022-10-02 NOTE — PROGRESS NOTE ADULT - SUBJECTIVE AND OBJECTIVE BOX
Patient is a 66y old  Male who presents with a chief complaint of Debility (01 Oct 2022 23:39)      Patient seen and examined at bedside. No events overnight. Patient for HD this morning. Denies any acute complaints, no chest pain, sob, abd pain.    ALLERGIES:  No Known Allergies    MEDICATIONS  (STANDING):  amLODIPine   Tablet 10 milliGRAM(s) Oral daily  artificial  tears Solution 2 Drop(s) Both EYES every 6 hours  brivaracetam 50 milliGRAM(s) Oral <User Schedule>  brivaracetam 50 milliGRAM(s) Oral <User Schedule>  carvedilol 12.5 milliGRAM(s) Oral every 12 hours  chlorhexidine 2% Cloths 1 Application(s) Topical <User Schedule>  dextrose 5%. 1000 milliLiter(s) (50 mL/Hr) IV Continuous <Continuous>  dextrose 5%. 1000 milliLiter(s) (100 mL/Hr) IV Continuous <Continuous>  dextrose 50% Injectable 25 Gram(s) IV Push once  dextrose 50% Injectable 12.5 Gram(s) IV Push once  dextrose 50% Injectable 25 Gram(s) IV Push once  doxazosin 4 milliGRAM(s) Oral <User Schedule>  epoetin cortney-epbx (RETACRIT) Injectable 70350 Unit(s) IV Push <User Schedule>  finasteride 5 milliGRAM(s) Oral daily  furosemide    Tablet 80 milliGRAM(s) Oral daily  glucagon  Injectable 1 milliGRAM(s) IntraMuscular once  heparin   Injectable 5000 Unit(s) SubCutaneous every 12 hours  insulin glargine Injectable (LANTUS) 5 Unit(s) SubCutaneous at bedtime  insulin lispro (ADMELOG) corrective regimen sliding scale   SubCutaneous three times a day before meals  insulin lispro (ADMELOG) corrective regimen sliding scale   SubCutaneous at bedtime  insulin lispro Injectable (ADMELOG) 5 Unit(s) SubCutaneous three times a day before meals  levothyroxine 50 MICROGram(s) Oral daily  multivitamin 1 Tablet(s) Oral daily  mycophenolate mofetil 500 milliGRAM(s) Oral <User Schedule>  nystatin    Suspension 363163 Unit(s) Oral four times a day  pantoprazole    Tablet 40 milliGRAM(s) Oral <User Schedule>  phenytoin   Capsule 200 milliGRAM(s) Oral two times a day  predniSONE   Tablet 10 milliGRAM(s) Oral daily  ramelteon 8 milliGRAM(s) Oral at bedtime  sevelamer carbonate 800 milliGRAM(s) Oral three times a day with meals  sirolimus 7 milliGRAM(s) Oral <User Schedule>  trimethoprim   80 mG/sulfamethoxazole 400 mG 1 Tablet(s) Oral daily  valGANciclovir 450 milliGRAM(s) Oral <User Schedule>    MEDICATIONS  (PRN):  acetaminophen     Tablet .. 650 milliGRAM(s) Oral every 6 hours PRN Temp greater or equal to 38C (100.4F), Mild Pain (1 - 3)  dextrose Oral Gel 15 Gram(s) Oral once PRN Blood Glucose LESS THAN 70 milliGRAM(s)/deciliter  polyethylene glycol 3350 17 Gram(s) Oral daily PRN Constipation    Vital Signs Last 24 Hrs  T(F): 97.5 (02 Oct 2022 08:27), Max: 97.7 (01 Oct 2022 09:50)  HR: 54 (02 Oct 2022 08:27) (54 - 66)  BP: 158/70 (02 Oct 2022 08:27) (113/59 - 158/70)  RR: 16 (02 Oct 2022 08:27) (16 - 16)  SpO2: 98% (02 Oct 2022 08:27) (96% - 98%)  I&O's Summary    01 Oct 2022 07:01  -  02 Oct 2022 07:00  --------------------------------------------------------  IN: 0 mL / OUT: 1500 mL / NET: -1500 mL      PHYSICAL EXAM:  General: NAD, laying in bed  ENT: MMM, no scleral icterus  Neck: Supple, No JVD, no thyroidomegaly  Lungs: Clear to auscultation bilaterally, no wheezes, no rales, no rhonchi, good inspiratory effort  Cardio: RRR, S1/S2, No murmurs  Abdomen: Soft, Nontender, Nondistended; Bowel sounds present  Extremities: No calf tenderness, No pitting edema, no skin changes    LABS:                        8.4    4.21  )-----------( 335      ( 02 Oct 2022 06:45 )             27.7     09-30    131  |  91  |  87  ----------------------------<  220  4.4   |  25  |  6.18    Ca    8.5      30 Sep 2022 09:50  Phos  4.6     09-30        PT/INR - ( 02 Oct 2022 06:45 )   PT: 21.2 sec;   INR: 1.82 ratio         PTT - ( 02 Oct 2022 06:45 )  PTT:38.7 sec        07-27 Chol -- LDL -- HDL -- Trig 118 mg/dL              POCT Blood Glucose.: 146 mg/dL (01 Oct 2022 21:08)  POCT Blood Glucose.: 123 mg/dL (01 Oct 2022 16:42)  POCT Blood Glucose.: 129 mg/dL (01 Oct 2022 13:15)  POCT Blood Glucose.: 229 mg/dL (01 Oct 2022 09:06)          COVID-19 PCR: NotDetec (09-27-22 @ 08:00)  COVID-19 PCR: NotDetec (09-21-22 @ 05:45)  COVID-19 PCR: NotDetec (09-15-22 @ 05:41)  COVID-19 PCR: NotDetec (09-09-22 @ 07:43)  COVID-19 PCR: NotDetec (09-07-22 @ 18:29)      RADIOLOGY & ADDITIONAL TESTS:    Care Discussed with Consultants/Other Providers:

## 2022-10-03 NOTE — PROGRESS NOTE ADULT - PROBLEM SELECTOR PLAN 1
No sign of active bleeding  plan is for upper endoscopy and colonoscopy 10/3  NPO  repeat COVID swab ordered STAT
No sign of active bleeding    plan is for upper endoscopy and colonoscopy 10/3    bowel prep tonight and tomorrow am    clears    npo after finishes bowel prep by 10 am    check pt/ptt in am

## 2022-10-03 NOTE — PROGRESS NOTE ADULT - SUBJECTIVE AND OBJECTIVE BOX
Patient is a 66y old  Male who presents with a chief complaint of Debility (02 Oct 2022 17:30)    Patient seen and examined at bedside. No events overnight. Denies any acute complaints, no chest pain, sob, abd pain. Patient for colonoscopy and EGD today.    ALLERGIES:  No Known Allergies    MEDICATIONS  (STANDING):  amLODIPine   Tablet 10 milliGRAM(s) Oral daily  artificial  tears Solution 2 Drop(s) Both EYES every 6 hours  brivaracetam 50 milliGRAM(s) Oral <User Schedule>  brivaracetam 50 milliGRAM(s) Oral <User Schedule>  carvedilol 12.5 milliGRAM(s) Oral every 12 hours  chlorhexidine 2% Cloths 1 Application(s) Topical <User Schedule>  dextrose 5%. 1000 milliLiter(s) (50 mL/Hr) IV Continuous <Continuous>  dextrose 5%. 1000 milliLiter(s) (100 mL/Hr) IV Continuous <Continuous>  dextrose 50% Injectable 25 Gram(s) IV Push once  dextrose 50% Injectable 12.5 Gram(s) IV Push once  dextrose 50% Injectable 25 Gram(s) IV Push once  doxazosin 4 milliGRAM(s) Oral <User Schedule>  epoetin cortney-epbx (RETACRIT) Injectable 35108 Unit(s) IV Push <User Schedule>  finasteride 5 milliGRAM(s) Oral daily  furosemide    Tablet 80 milliGRAM(s) Oral daily  glucagon  Injectable 1 milliGRAM(s) IntraMuscular once  insulin glargine Injectable (LANTUS) 5 Unit(s) SubCutaneous at bedtime  insulin lispro (ADMELOG) corrective regimen sliding scale   SubCutaneous three times a day before meals  insulin lispro (ADMELOG) corrective regimen sliding scale   SubCutaneous at bedtime  insulin lispro Injectable (ADMELOG) 5 Unit(s) SubCutaneous three times a day before meals  levothyroxine 50 MICROGram(s) Oral daily  multivitamin 1 Tablet(s) Oral daily  mycophenolate mofetil 500 milliGRAM(s) Oral <User Schedule>  nystatin    Suspension 387045 Unit(s) Oral four times a day  pantoprazole    Tablet 40 milliGRAM(s) Oral <User Schedule>  phenytoin   Capsule 200 milliGRAM(s) Oral two times a day  polyethylene glycol/electrolyte Solution      polyethylene glycol/electrolyte Solution 1000 milliLiter(s) Oral once  predniSONE   Tablet 10 milliGRAM(s) Oral daily  ramelteon 8 milliGRAM(s) Oral at bedtime  sevelamer carbonate 800 milliGRAM(s) Oral three times a day with meals  sirolimus 7 milliGRAM(s) Oral <User Schedule>  trimethoprim   80 mG/sulfamethoxazole 400 mG 1 Tablet(s) Oral daily  valGANciclovir 450 milliGRAM(s) Oral <User Schedule>    MEDICATIONS  (PRN):  acetaminophen     Tablet .. 650 milliGRAM(s) Oral every 6 hours PRN Temp greater or equal to 38C (100.4F), Mild Pain (1 - 3)  dextrose Oral Gel 15 Gram(s) Oral once PRN Blood Glucose LESS THAN 70 milliGRAM(s)/deciliter  polyethylene glycol 3350 17 Gram(s) Oral daily PRN Constipation    Vital Signs Last 24 Hrs  T(F): 98.9 (03 Oct 2022 08:08), Max: 98.9 (03 Oct 2022 08:08)  HR: 65 (03 Oct 2022 08:08) (54 - 71)  BP: 133/69 (03 Oct 2022 08:08) (133/69 - 158/70)  RR: 16 (03 Oct 2022 08:08) (16 - 16)  SpO2: 99% (03 Oct 2022 08:08) (98% - 99%)  I&O's Summary      PHYSICAL EXAM:  General: NAD, laying in bed  ENT: MMM, no scleral icterus  Neck: Supple, No JVD, no thyroidomegaly  Lungs: Clear to auscultation bilaterally, no wheezes, no rales, no rhonchi, good inspiratory effort  Cardio: RRR, S1/S2, No murmurs  Abdomen: Soft, Nontender, Nondistended; Bowel sounds present  Extremities: No calf tenderness, No pitting edema, no skin changes    LABS:                        8.6    4.33  )-----------( 326      ( 03 Oct 2022 06:30 )             28.1     09-30    131  |  91  |  87  ----------------------------<  220  4.4   |  25  |  6.18    Ca    8.5      30 Sep 2022 09:50  Phos  4.6     09-30        PT/INR - ( 03 Oct 2022 06:30 )   PT: 18.6 sec;   INR: 1.60 ratio         PTT - ( 03 Oct 2022 06:30 )  PTT:37.3 sec        07-27 Chol -- LDL -- HDL -- Trig 118 mg/dL              POCT Blood Glucose.: 199 mg/dL (03 Oct 2022 08:11)  POCT Blood Glucose.: 263 mg/dL (02 Oct 2022 20:44)  POCT Blood Glucose.: 141 mg/dL (02 Oct 2022 17:12)  POCT Blood Glucose.: 149 mg/dL (02 Oct 2022 11:09)          COVID-19 PCR: NotDetec (09-27-22 @ 08:00)  COVID-19 PCR: NotDetec (09-21-22 @ 05:45)  COVID-19 PCR: NotDetec (09-15-22 @ 05:41)  COVID-19 PCR: NotDetec (09-09-22 @ 07:43)  COVID-19 PCR: NotDetec (09-07-22 @ 18:29)      RADIOLOGY & ADDITIONAL TESTS: reviewed labs    Care Discussed with Consultants/Other Providers: discussed with Dr. Yip

## 2022-10-03 NOTE — PROGRESS NOTE ADULT - ASSESSMENT
66M with DM2, HTN, CAD with stents/CABG, AF on Coumadin, hx CVA, seizure, PUD, s/p failed renal transplant now back on HD, recent hospitalization for seizures requiring intubation and course complicated by UTI, as well as need for VATS due to large hemothorax, now in rehab     #Seizure disorder  - c/w phenytoin and Brivaracetam   - neuro consulted, Dr. Deal    #ESRD on HD / failed renal transplant  - c/w HD as per renal  - c/w immunosuppression  - c/w infectious disease prophylaxis    #Anemia of chronic disease  - s/p 1U PRBC on 10/1 with HD  - keep active type and screen  - c/w EPO  - FOBT positive, GI recs appreciated, plan for EGD/colonoscopy on Monday  - hold coumadin for now, Continue current treatment and will restart coumadin once cleared by GI    #Chronic AF   - on Coumadin: goal INR 2-3, held for now in setting of colonosocpy/EGD on Monday  - c/w Coreg    #DM2 with steroid-induced hyperglycemia  - A1C with Estimated Average Glucose Result: 6.4 %  - Admelog 5 units premeal  - C/w lantus 5units qhs  - C/w hypoglycemia protocol    #Hypothyroid  - c/w synthroid    #Right IJ DVT  - coumadin, held for scopes    #Urinary retention  - c/w Proscar and Doxazosin     #Abdominal wall cellulitis  - Completed course of antibiotics    #DVT ppx:  - Coumadin held for scopes

## 2022-10-03 NOTE — PROGRESS NOTE ADULT - SUBJECTIVE AND OBJECTIVE BOX
SUBJECTIVE/ROS:  Patient seen and evaluated sitting up in WC at bedside  BM all morning d/t bowel prep for colonoscopy today - will check for BMP today (potassium level)  Denies CP, palpitation, SOB, or cough  No HA, dizziness, or lightheadedness  no nausea or abd pain.  LBM 10/3  minimal to none urine output  Last HD 10/1 - received 1 Unit of PRBC    Vital Signs Last 24 Hrs  T(C): 37.2 (10-03-22 @ 08:08), Max: 37.2 (10-03-22 @ 08:08)  T(F): 98.9 (10-03-22 @ 08:08), Max: 98.9 (10-03-22 @ 08:08)  HR: 65 (10-03-22 @ 08:08) (58 - 71)  BP: 133/69 (10-03-22 @ 08:08) (133/69 - 150/57)  RR: 16 (10-03-22 @ 08:08) (16 - 16)  SpO2: 99% (10-03-22 @ 08:08) (98% - 99%)  on room air     Daily -  Admission weight () 69.8 kg        Daily Weight in k.1 (03 Oct 2022 05:46)    Gen - NAD, comfortable on RA  HEENT - NCAT, EOMI, MMM  Neck - Supple, No limited ROM  Pulm - diminished to bilateral base  Cardiovascular - warm and well perfused  Chest - left chest wall permcath  Abdomen -  distended, but nontender, tympanic  Extremities - No Cyanosis, no clubbing, no edema, no calf tenderness  Neuro-     Cognitive - awake, alert, oriented to person and place. Delayed processing/response     Cranial Nerves - CN 2-12 intact. No facial asymmetry, Tongue midline, EOMI, Shoulder shrug intact     Motor -                     LEFT    UE - 3+5                    RIGHT UE - 3-/5                    LEFT    LE - KE 4/5                    RIGHT LE - KE 4/5        Sensory - Intact to LT bilaterally  MSK: generalized weakness  Skin:  stage 2 pressure ulcer to sacrum 0.3 x 0.2,  discoloration to lateral RLQ of abdomen    RECENT LABS:        Uric Acid, Serum: 3.6 mg/dL (22 @ 06:45)   C-Reactive Protein, Serum: 200 mg/L (22 @ 06:45)   Phosphorus Level, Serum: 3.8 mg/dL (22 @ 06:45)   Intact PTH: 183  Lactate Dehydrogenase, Serum: 257 U/L (22 @ 06:45)   Magnesium, Serum: 1.8 mg/dL (22 @ 06:45)       LAB                        8.6    4.33  )-----------( 326      ( 03 Oct 2022 06:30 )             28.1       Pending BMP      PT/INR - ( 03 Oct 2022 06:30 )   PT: 18.6 sec;   INR: 1.60 ratio    PTT - ( 03 Oct 2022 06:30 )  PTT:37.3 sec    FOBT - positive ()    Xray Chest 1 View-PORTABLE IMMEDIATE (Xray Chest 1 View-PORTABLE IMMEDIATE .) (22 @ 12:26) >  Decreasing bilateral perihilar mild diffuse airspace   disease.. No pneumothorax. No large effusion.    Xray Chest 1 View- PORTABLE-Routine (Xray Chest 1 View- PORTABLE-Routine .) (22 @ 12:04) >  Interval exchange of the dialysis catheter. No evidence of pneumothorax.   No other significant change.    Allergies  No Known Allergies    MEDICATIONS  (STANDING):  amLODIPine   Tablet 10 milliGRAM(s) Oral daily  artificial  tears Solution 2 Drop(s) Both EYES every 6 hours  brivaracetam 50 milliGRAM(s) Oral <User Schedule>  brivaracetam 50 milliGRAM(s) Oral <User Schedule>  carvedilol 12.5 milliGRAM(s) Oral every 12 hours  chlorhexidine 2% Cloths 1 Application(s) Topical <User Schedule>  dextrose 5%. 1000 milliLiter(s) (50 mL/Hr) IV Continuous <Continuous>  dextrose 5%. 1000 milliLiter(s) (100 mL/Hr) IV Continuous <Continuous>  dextrose 50% Injectable 25 Gram(s) IV Push once  dextrose 50% Injectable 12.5 Gram(s) IV Push once  dextrose 50% Injectable 25 Gram(s) IV Push once  doxazosin 4 milliGRAM(s) Oral <User Schedule>  epoetin cortney-epbx (RETACRIT) Injectable 21833 Unit(s) IV Push <User Schedule>  finasteride 5 milliGRAM(s) Oral daily  furosemide    Tablet 80 milliGRAM(s) Oral daily  glucagon  Injectable 1 milliGRAM(s) IntraMuscular once  insulin glargine Injectable (LANTUS) 5 Unit(s) SubCutaneous at bedtime  insulin lispro (ADMELOG) corrective regimen sliding scale   SubCutaneous three times a day before meals  insulin lispro (ADMELOG) corrective regimen sliding scale   SubCutaneous at bedtime  insulin lispro Injectable (ADMELOG) 5 Unit(s) SubCutaneous three times a day before meals  levothyroxine 50 MICROGram(s) Oral daily  multivitamin 1 Tablet(s) Oral daily  mycophenolate mofetil 500 milliGRAM(s) Oral <User Schedule>  nystatin    Suspension 992112 Unit(s) Oral four times a day  pantoprazole    Tablet 40 milliGRAM(s) Oral <User Schedule>  phenytoin   Capsule 200 milliGRAM(s) Oral two times a day  predniSONE   Tablet 10 milliGRAM(s) Oral daily  ramelteon 8 milliGRAM(s) Oral at bedtime  sevelamer carbonate 800 milliGRAM(s) Oral three times a day with meals  sirolimus 7 milliGRAM(s) Oral <User Schedule>  trimethoprim   80 mG/sulfamethoxazole 400 mG 1 Tablet(s) Oral daily  valGANciclovir 450 milliGRAM(s) Oral <User Schedule>    MEDICATIONS  (PRN):  acetaminophen     Tablet .. 650 milliGRAM(s) Oral every 6 hours PRN Temp greater or equal to 38C (100.4F), Mild Pain (1 - 3)  dextrose Oral Gel 15 Gram(s) Oral once PRN Blood Glucose LESS THAN 70 milliGRAM(s)/deciliter  polyethylene glycol 3350 17 Gram(s) Oral daily PRN Constipation

## 2022-10-03 NOTE — PROGRESS NOTE ADULT - ASSESSMENT
66 yr old Male with PMHx of DM type II, HTN, CAD s/p stents/ CABG (2020), AFib on Eliquis, CVA (2019) d/t Afib, Seizure d/o following CVA, last episode was on 4/8/22, h/o GIB (2/2022) EGD with duodenal ulcer.  ESRD due to DM was on HD since 2019, s/p premacath removal 5/10/22 s/p Right DDRT (4/21/22) and placed on belatacept.  Frequent hospitalization d/t weakness, refractory sz, and sepsis.  Admitted and intubated 6/10, found to large pleural effusion s/p thoracentesis ~1.3 and VATS of thoracic 2.2 L.      * Neurology consult for alternative med to Briviate (not approved by insurance post discharge),  * FOBT +VE, Due C scope 10/3,  * Stop coumadin, start heparin bridge * NPO midnight Sunday (10/2), C scope Monday 10/3    TRANSPLANT PATIENT -  *Coumadin held, on heparin over the weekend in preparation for colonoscopy for today   * Anemia of chronic disease - hgb 7.3 (9/28) -> 8.7 (9/27) -> 7.4 (9/30 - likely from hemodilution prior to HD).  FOBT positive - seen by GI.  received 1 U PRBC 10/1 in HD  * transplant team PHONE  --Iker Alfred (MD) ??  Dr. Eduardo Barber (transplant surgeon) - Darleen Dior NP [phone]*  * Barby MUNOZ - recommendations for labs: BK virus DNA (negative 9/29), lactate ( 340 9/22 -> 257 9/29 ), mag (1.9 - 1.8 9/29 ), parathyroid (178 -> 183 9/29), phosphorus ( 3.7 -> 3.8 9/29), CRP (190 -> 200 9/29), uric acid (3.2 -> 3.6 9/29), and UA (oliguria).  CMV pcr (negative 9/29)  * iron panel: total iron 13, unsaturated iron 111. total binding 124 and saturation % 11  *Barby MUNOZ emailed in regards to lab monitoring and to review discharge medications ---- Transplant NP esubmit all medications to cfam vivo -> brivaracetam denied by insurance, pending appeal.  Neuro consult for seizure management  * HD T/TH/Sat - need additional brivaracetam after HD  - Neuro consult appreciated - pt's family prefer to discuss with primary neuro (Dr. Patterson)    COMORBIDITES/ACTIVE MEDICAL ISSUES   Gait Instability, ADL impairments and Functional impairments secondary to recurrent UTI, status epilepticus, with debility seizures. Start Comprehensive Rehab Program of PT/OT/SLP * therapy changes: d/c SLP, 90 PT, 90 OT    # Sepsis  - recurrent UTI treated with abx  - pleural effusion s/p thoracentesis 1.3L   - hemothorax s/p VATS 2.2 L  - BCx (8/14) negative  - ID following    #RLQ cellulitis   - complete course of keflex 8/20 - 9/4    #Seizure  - on Phenytoin  - Noted on EEG, +Brivaracetam (8/16)  - Additional Brivaracetam 50mg post each HD session***  - seizure precaution  - neuro consult appreciated in regards to medication management - family would like to discuss with primary neuro (Dr. 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  Belatacept d/t 8/1 and was started on Sirolimus 7mg QD  - Prednisone 10  - Cellcept 250 BID on hold  - S/p perma cath placement 8/30/2022  - PPx medications: Nystatin, Bactrim, Valcyte (MWF)  - Nephrology following - modified to MWF ->  blood transfusion with HD on 10/1 -> now switched to T/TH/Sat  - Strongyloides Antibodies positive - Ivermectin x1 dose (9/21)    #Anemia of chronic disease  - Has gotten total of 6U PRBC and 2 U FFP during acute hospital   - monitor H/H 8.0 (9/12) - 7.4 (9/30) -> 8.6 (10/3 (post transfusion)  - FOBT positive --- GI consult appreciated, Colonoscopy for 10/3  - iron panel low -> 1 U PRBC 10/1    #HTN  #Afib  - Eliquis switched to Coumadin  - Coreg   - Daily INR til therapeutic on a consistent coumadin dose - hold for colonoscopy, given Heparin for the weekend    #Diabetes  - A1C  - Lantus  - standing Admelog dc, restarted 9/16   - FS + ISS  - Hospitalist following   - FS  [phone] (10/3)    #Hypothyroidism  - Synthroid 37.5 mcg    #Pain control  - Tylenol PRN    #GI/Bowel Mgmt   - At risk for constipation due to neurologic diagnosis, immobility and/or medication use  - Senna d/c d/t loose BM 9/11  -  Miralax PRN    #Bladder management  - incontinence     #DVT   - acute DVT to R IJ thrombosis, Superficial vein thrombus to right basilic and cephalic vein  - Coumadin - held til after Colonoscopy  - SCDs, TEDs     #Skin:  -Stage 2 pressure ulcer to sacrum 0.3 x 0.2- cleanse with NS, apply wet gauze andcover with allevyn foam dressing  -At risk for pressure injury due to neurologic diagnosis and relative immobility  -Bilateral heels - soft heel protectors, apply liquid barrier film daily and monitor for tissue type changes  - Turn Q2 hr while in bed, air mattress  - Skin barrier cream as needed  - Nursing to monitor skin Qshift    Diet  - Regular/easy to chew + Thins  [CCHO, renal diet]    + Fluid Restriction: 1L    Precautions / PROPHYLAXIS:   - Falls, Cardiac, Seizure   - ortho: Weight bearing status: WBAT   - Lungs: Aspiration, Incentive Spirometer   - Pressure injury/Skin: Turn Q2hrs while in bed, OOB to Chair, PT/OT      liaison with family 9/12--Spoke with son Dr Perez  He gave update on patient's clinical state, investigation results and function prior to acute rehab treatment   I gave update on patients current status, and he was happy with same  Plan to to give interval updates to family as needed    9/22/22--I discussed with his second son (an RN) at patient's bed side  He reports family's plan for home dc, arrangements made to care for patient--his wife is always at home, and sons make time available    IDT 9/26   lives with spouse and 2 sons in  with 6 [facility], [place]st [place] setup.  PTA needed some assistance with ADLs  Functional status: mod A x 1 with stand piovt with and wo RW (needs cutes), mod A to initiate task.  Eating with incidental Assist.  Transfers: sit->stand min A, but mod A + cues with stand to sit, mod A to WC. Ambulated 50' RW min + WCF  Goal: min A/needs hands on A, mod A shower transfer  SLP: discharge as per family   TDD: 9/29 Home need 24/7 assistance.  Family putting in ramp on Wednesday (9/28)    Follow ups:  Andre Patterson)  Clinical Neurophysiology; EEGEpilepsy; Neurology  [address] [facility], [address]  [place] [address]  Phone: [phone]  Fax: [phone]  Follow Up Time:     Padmini Lincoln ()  Internal Medicine  [address] [facility], [address]  [place] [address]  Phone: [phone]  Fax: [phone]  Follow Up Time:     Iker Alfred)  Internal Medicine; Nephrology  [address] [place] [address]  Phone: [phone]  Fax: [phone]  Follow Up Time:    Dr. Eduardo Barber - Organ Transplant    Iker Alfred  [facility]  [address]PH[address]  Scheduled Appointment: 09/20/2022    Iker Alfred  [facility]  NEPHRO [address] [place] [address] D  Scheduled Appointment: 10/20/2022     66 yr old Male with PMHx of DM type II, HTN, CAD s/p stents/ CABG (2020), AFib on Eliquis, CVA (2019) d/t Afib, Seizure d/o following CVA, last episode was on 4/8/22, h/o GIB (2/2022) EGD with duodenal ulcer.  ESRD due to DM was on HD since 2019, s/p premacath removal 5/10/22 s/p Right DDRT (4/21/22) and placed on belatacept.  Frequent hospitalization d/t weakness, refractory sz, and sepsis.  Admitted and intubated 6/10, found to large pleural effusion s/p thoracentesis ~1.3 and VATS of thoracic 2.2 L.      * Neurology consult for alternative med to Briviate (not approved by insurance post discharge),  * FOBT +VE, Due C scope 10/3,  * Stop coumadin, start heparin bridge * NPO midnight Sunday (10/2), C scope Monday 10/3    TRANSPLANT PATIENT -  *Coumadin held, on heparin over the weekend in preparation for colonoscopy for today   * Anemia of chronic disease - hgb 7.3 (9/28) -> 8.7 (9/27) -> 7.4 (9/30 - likely from hemodilution prior to HD).  FOBT positive - seen by GI.  received 1 U PRBC 10/1 in HD  * transplant team PHONE  --Iker Alfred (MD) ??  Dr. Eduardo Barber (transplant surgeon) - Darleen Dior NP [phone]*  * Barby MUNOZ - recommendations for labs: BK virus DNA (negative 9/29), lactate ( 340 9/22 -> 257 9/29 ), mag (1.9 - 1.8 9/29 ), parathyroid (178 -> 183 9/29), phosphorus ( 3.7 -> 3.8 9/29), CRP (190 -> 200 9/29), uric acid (3.2 -> 3.6 9/29), and UA (oliguria).  CMV pcr (negative 9/29)  * iron panel: total iron 13, unsaturated iron 111. total binding 124 and saturation % 11  *Barby MUNOZ emailed in regards to lab monitoring and to review discharge medications ---- Transplant NP esubmit all medications to cfam vivo -> brivaracetam denied by insurance, pending appeal.  Neuro consult for seizure management  * HD T/TH/Sat - need additional brivaracetam after HD  - Neuro consult appreciated - pt's family prefer to discuss with primary neuro (Dr. Patterson)    COMORBIDITES/ACTIVE MEDICAL ISSUES   Gait Instability, ADL impairments and Functional impairments secondary to recurrent UTI, status epilepticus, with debility seizures. Start Comprehensive Rehab Program of PT/OT/SLP * therapy changes: d/c SLP, 90 PT, 90 OT    # Sepsis  - recurrent UTI treated with abx  - pleural effusion s/p thoracentesis 1.3L   - hemothorax s/p VATS 2.2 L  - BCx (8/14) negative  - ID following    #RLQ cellulitis   - complete course of keflex 8/20 - 9/4    #Seizure  - on Phenytoin  - Noted on EEG, +Brivaracetam (8/16)  - Additional Brivaracetam 50mg post each HD session***  - seizure precaution  - neuro consult appreciated in regards to medication management - family would like to discuss with primary neuro (Dr. 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  Belatacept d/t 8/1 and was started on Sirolimus 7mg QD  - Prednisone 10  - Cellcept 250 BID on hold  - S/p perma cath placement 8/30/2022  - PPx medications: Nystatin, Bactrim, Valcyte (MWF)  - Nephrology following - modified to MWF ->  blood transfusion with HD on 10/1 -> now switched to T/TH/Sat  - Strongyloides Antibodies positive - Ivermectin x1 dose (9/21)    #Anemia of chronic disease  - Has gotten total of 6U PRBC and 2 U FFP during acute hospital   - monitor H/H 8.0 (9/12) - 7.4 (9/30) -> 8.6 (10/3 (post transfusion)  - FOBT positive --- GI consult appreciated, Colonoscopy for 10/3  - iron panel low -> 1 U PRBC 10/1    #HTN  #Afib  - Eliquis switched to Coumadin  - Coreg   - Daily INR til therapeutic on a consistent coumadin dose - hold for colonoscopy, given Heparin for the weekend    #Diabetes  - A1C  - Lantus  - standing Admelog dc, restarted 9/16   - FS + ISS  - Hospitalist following   - FS  [phone] (10/3)    #Hypothyroidism  - Synthroid 37.5 mcg    #Pain control  - Tylenol PRN    #GI/Bowel Mgmt   - At risk for constipation due to neurologic diagnosis, immobility and/or medication use  - Senna d/c d/t loose BM 9/11  -  Miralax PRN    #Bladder management  - incontinence     #DVT   - acute DVT to R IJ thrombosis, Superficial vein thrombus to right basilic and cephalic vein  - Coumadin - held til after Colonoscopy  - SCDs, TEDs     #Skin:  -Stage 2 pressure ulcer to sacrum 0.3 x 0.2- cleanse with NS, apply wet gauze andcover with allevyn foam dressing  -At risk for pressure injury due to neurologic diagnosis and relative immobility  -Bilateral heels - soft heel protectors, apply liquid barrier film daily and monitor for tissue type changes  - Turn Q2 hr while in bed, air mattress  - Skin barrier cream as needed  - Nursing to monitor skin Qshift    Diet  - Regular/easy to chew + Thins  [CCHO, renal diet]    + Fluid Restriction: 1L    Precautions / PROPHYLAXIS:   - Falls, Cardiac, Seizure   - ortho: Weight bearing status: WBAT   - Lungs: Aspiration, Incentive Spirometer   - Pressure injury/Skin: Turn Q2hrs while in bed, OOB to Chair, PT/OT      liaison with family 9/12--Spoke with son Dr Perez  He gave update on patient's clinical state, investigation results and function prior to acute rehab treatment   I gave update on patients current status, and he was happy with same  Plan to to give interval updates to family as needed    9/22/22--I discussed with his second son (an RN) at patient's bed side  He reports family's plan for home dc, arrangements made to care for patient--his wife is always at home, and sons make time available    IDT 10/3  lives with spouse and 2 sons in  with 6 [employer], [place] setup.  PTA needed some assistance with ADLs  Functional status: ADLs min-mod A with max cutes, sequencing, hand placement, transferring min A RW, amb 50' RW min-mod A   Goal: min A/needs hands on A, mod A shower transfer  TDD: 10/5 home ---- awaiting for seizure medication approval     Follow ups:  Andre Patterson (MD)  Clinical Neurophysiology; EEGEpilepsy; Neurology  [address] [facility], [address]  [place] [address]  Phone: [phone]  Fax: [phone]  Follow Up Time:     Padmini Lincoln (DO)  Internal Medicine  [address] [facility], [address]  [place] [address]  Phone: [phone]  Fax: [phone]  Follow Up Time:     Iker Alfred)  Internal Medicine; Nephrology  [address] [place] [address]  Phone: [phone]  Fax: [phone]  Follow Up Time:    Dr. Eduardo Barber - Organ Transplant    Iker Alfred  [facility] Physician [facility]  NEPH[address] [facility] D  Scheduled Appointment: 09/20/2022    Iker Alfred  [facility] Physician [facility]  NEPHRO [address] [place] [address] D  Scheduled Appointment: 10/20/2022     66 yr old Male with PMHx of DM type II, HTN, CAD s/p stents/ CABG (2020), AFib on Eliquis, CVA (2019) d/t Afib, Seizure d/o following CVA, last episode was on 4/8/22, h/o GIB (2/2022) EGD with duodenal ulcer.  ESRD due to DM was on HD since 2019, s/p premacath removal 5/10/22 s/p Right DDRT (4/21/22) and placed on belatacept.  Frequent hospitalization d/t weakness, refractory sz, and sepsis.  Admitted and intubated 6/10, found to large pleural effusion s/p thoracentesis ~1.3 and VATS of thoracic 2.2 L.      * Neurology consult for alternative med to Briviate (not approved by insurance post discharge),  * FOBT +VE, Due C scope 10/3,  * Stop coumadin, start heparin bridge * NPO midnight Sunday (10/2), C scope Monday 10/3    TRANSPLANT PATIENT -  *Coumadin held, on heparin over the weekend in preparation for colonoscopy for today   * Anemia of chronic disease - hgb 7.3 (9/28) -> 8.7 (9/27) -> 7.4 (9/30 - likely from hemodilution prior to HD).  FOBT positive - seen by GI.  received 1 U PRBC 10/1 in HD  * transplant team PHONE  --Iker Alfred (MD) ??  Dr. Eduardo Barber (transplant surgeon) - Darleen Dior NP [phone]*  * Barby MUNOZ - recommendations for labs: BK virus DNA (negative 9/29), lactate ( 340 9/22 -> 257 9/29 ), mag (1.9 - 1.8 9/29 ), parathyroid (178 -> 183 9/29), phosphorus ( 3.7 -> 3.8 9/29), CRP (190 -> 200 9/29), uric acid (3.2 -> 3.6 9/29), and UA (oliguria).  CMV pcr (negative 9/29)  * iron panel: total iron 13, unsaturated iron 111. total binding 124 and saturation % 11  *Barby MUNOZ emailed in regards to lab monitoring and to review discharge medications ---- Transplant NP esubmit all medications to cfam vivo -> brivaracetam denied by insurance, pending appeal.  Neuro consult for seizure management  * HD T/TH/Sat - need additional brivaracetam after HD  - Neuro consult appreciated - pt's family prefer to discuss with primary neuro (Dr. Patterson)    COMORBIDITES/ACTIVE MEDICAL ISSUES   Gait Instability, ADL impairments and Functional impairments secondary to recurrent UTI, status epilepticus, with debility seizures. Start Comprehensive Rehab Program of PT/OT/SLP * therapy changes: d/c SLP, 90 PT, 90 OT    # Sepsis  - recurrent UTI treated with abx  - pleural effusion s/p thoracentesis 1.3L   - hemothorax s/p VATS 2.2 L  - BCx (8/14) negative  - ID following    #RLQ cellulitis   - complete course of keflex 8/20 - 9/4    #Seizure  - on Phenytoin  - Noted on EEG, +Brivaracetam (8/16)  - Additional Brivaracetam 50mg post each HD session***  - seizure precaution  - neuro consult appreciated in regards to medication management - family would like to discuss with primary neuro (Dr. 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  Belatacept d/t 8/1 and was started on Sirolimus 7mg QD  - Prednisone 10  - Cellcept 250 BID on hold  - S/p perma cath placement 8/30/2022  - PPx medications: Nystatin, Bactrim, Valcyte (MWF)  - Nephrology following - modified to MWF ->  blood transfusion with HD on 10/1 -> now switched to T/TH/Sat  - Strongyloides Antibodies positive - Ivermectin x1 dose (9/21)    #Anemia of chronic disease  - Has gotten total of 6U PRBC and 2 U FFP during acute hospital   - monitor H/H 8.0 (9/12) - 7.4 (9/30) -> 8.6 (10/3 (post transfusion)  - FOBT positive --- GI consult appreciated, Colonoscopy for 10/3  - iron panel low -> 1 U PRBC 10/1    #HTN  #Afib  - Eliquis switched to Coumadin  - Coreg   - Daily INR til therapeutic on a consistent coumadin dose - hold for colonoscopy, given Heparin for the weekend    #Diabetes  - A1C  - Lantus  - standing Admelog dc, restarted 9/16   - FS + ISS  - Hospitalist following   - FS  141-263 (10/3)    #Hypothyroidism  - Synthroid 37.5 mcg    #Pain control  - Tylenol PRN    #GI/Bowel Mgmt   - At risk for constipation due to neurologic diagnosis, immobility and/or medication use  - Senna d/c d/t loose BM 9/11  -  Miralax PRN    #Bladder management  - incontinence     #DVT   - acute DVT to R IJ thrombosis, Superficial vein thrombus to right basilic and cephalic vein  - Coumadin - held til after Colonoscopy  - SCDs, TEDs     #Skin:  -Stage 2 pressure ulcer to sacrum 0.3 x 0.2- cleanse with NS, apply wet gauze andcover with allevyn foam dressing  -At risk for pressure injury due to neurologic diagnosis and relative immobility  -Bilateral heels - soft heel protectors, apply liquid barrier film daily and monitor for tissue type changes  - Turn Q2 hr while in bed, air mattress  - Skin barrier cream as needed  - Nursing to monitor skin Qshift    Diet  - Regular/easy to chew + Thins  [CCHO, renal diet]    + Fluid Restriction: 1L    Precautions / PROPHYLAXIS:   - Falls, Cardiac, Seizure   - ortho: Weight bearing status: WBAT   - Lungs: Aspiration, Incentive Spirometer   - Pressure injury/Skin: Turn Q2hrs while in bed, OOB to Chair, PT/OT      liaison with family 9/12--Spoke with son Dr Perez  He gave update on patient's clinical state, investigation results and function prior to acute rehab treatment   I gave update on patients current status, and he was happy with same  Plan to to give interval updates to family as needed    9/22/22--I discussed with his second son (an RN) at patient's bed side  He reports family's plan for home dc, arrangements made to care for patient--his wife is always at home, and sons make time available    10/3--I called son, on phone and discussed result of Endoscropy today, plan to achieve therapeutic INR and ensure approval of sirolimus with Transplant team prior to dc  He was happy with the explanation    IDT 10/3  lives with spouse and 2 sons in [place] with 6 [employer], [place] setup.  PTA needed some assistance with ADLs  Functional status: ADLs min-mod A with max cutes, sequencing, hand placement, transferring min A RW, amb 50' RW min-mod A   Goal: min A/needs hands on A, mod A shower transfer  TDD: 10/5 home ---- awaiting for seizure medication approval     Follow ups:  Andre Patterson)  Clinical Neurophysiology; EEGEpilepsy; Neurology  [address] [facility], [address]  [place] [address]  Phone: [phone]  Fax: [phone]  Follow Up Time:     Padmini Lincoln ()  Internal Medicine  [address] [facility], [address]  [place] [address]  Phone: [phone]  Fax: [phone]  Follow Up Time:     Iker Alfred)  Internal Medicine; Nephrology  [address] [place] [address]  Phone: [phone]  Fax: [phone]  Follow Up Time:    Dr. Eduardo Barber - Organ Transplant    Iker Alfred  [facility]  NEPHRO [address] [place] [address] D  Scheduled Appointment: 09/20/2022    Iker Alfred  [facility]  NEPHRO [address] [place] [address] D  Scheduled Appointment: 10/20/2022

## 2022-10-03 NOTE — BRIEF OPERATIVE NOTE - NSICDXBRIEFPROCEDURE_GEN_ALL_CORE_FT
PROCEDURES:  Upper endoscopy 03-Oct-2022 15:06:28  Cornel Espinoza  Colonoscopy 03-Oct-2022 15:06:48  Cornel Espinoza

## 2022-10-03 NOTE — PROGRESS NOTE ADULT - SUBJECTIVE AND OBJECTIVE BOX
INTERVAL HPI/OVERNIGHT EVENTS:  HPI:    67 y/o male admitted to rehab. Patient seen and examined at bedside.     MEDICATIONS  (STANDING):  amLODIPine   Tablet 10 milliGRAM(s) Oral daily  artificial  tears Solution 2 Drop(s) Both EYES every 6 hours  brivaracetam 50 milliGRAM(s) Oral <User Schedule>  brivaracetam 50 milliGRAM(s) Oral <User Schedule>  carvedilol 12.5 milliGRAM(s) Oral every 12 hours  chlorhexidine 2% Cloths 1 Application(s) Topical <User Schedule>  dextrose 5%. 1000 milliLiter(s) (50 mL/Hr) IV Continuous <Continuous>  dextrose 5%. 1000 milliLiter(s) (100 mL/Hr) IV Continuous <Continuous>  dextrose 50% Injectable 25 Gram(s) IV Push once  dextrose 50% Injectable 12.5 Gram(s) IV Push once  dextrose 50% Injectable 25 Gram(s) IV Push once  doxazosin 4 milliGRAM(s) Oral <User Schedule>  epoetin cortney-epbx (RETACRIT) Injectable 74435 Unit(s) IV Push <User Schedule>  finasteride 5 milliGRAM(s) Oral daily  furosemide    Tablet 80 milliGRAM(s) Oral daily  glucagon  Injectable 1 milliGRAM(s) IntraMuscular once  insulin glargine Injectable (LANTUS) 5 Unit(s) SubCutaneous at bedtime  insulin lispro (ADMELOG) corrective regimen sliding scale   SubCutaneous three times a day before meals  insulin lispro (ADMELOG) corrective regimen sliding scale   SubCutaneous at bedtime  insulin lispro Injectable (ADMELOG) 5 Unit(s) SubCutaneous three times a day before meals  levothyroxine 50 MICROGram(s) Oral daily  multivitamin 1 Tablet(s) Oral daily  mycophenolate mofetil 500 milliGRAM(s) Oral <User Schedule>  nystatin    Suspension 828799 Unit(s) Oral four times a day  pantoprazole    Tablet 40 milliGRAM(s) Oral <User Schedule>  phenytoin   Capsule 200 milliGRAM(s) Oral two times a day  predniSONE   Tablet 10 milliGRAM(s) Oral daily  ramelteon 8 milliGRAM(s) Oral at bedtime  sevelamer carbonate 800 milliGRAM(s) Oral three times a day with meals  sirolimus 7 milliGRAM(s) Oral <User Schedule>  trimethoprim   80 mG/sulfamethoxazole 400 mG 1 Tablet(s) Oral daily  valGANciclovir 450 milliGRAM(s) Oral <User Schedule>    MEDICATIONS  (PRN):  acetaminophen     Tablet .. 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      Anemia      PEG (percutaneous endoscopic gastrostomy) status  removed 2020      Intubation of airway performed without difficulty  Dec 2019  CVA c/b status epilepticus requiring intubation and PEG in 2019-      ESRD on dialysis  M/W/F      Seizures  after CVA, last seizure was last week 22      History of insertion of stent into coronary artery bypass graft   and       S/P CABG x 3  off pump C3L on 20      PHYSICAL EXAM:   Vital Signs:  Vital Signs Last 24 Hrs  T(C): 36.8 (03 Oct 2022 12:16), Max: 37.2 (03 Oct 2022 08:08)  T(F): 98.2 (03 Oct 2022 12:16), Max: 98.9 (03 Oct 2022 08:08)  HR: 60 (03 Oct 2022 12:16) (58 - 71)  BP: 162/74 (03 Oct 2022 12:16) (133/69 - 162/74)  BP(mean): --  RR: 16 (03 Oct 2022 12:16) (16 - 16)  SpO2: 96% (03 Oct 2022 12:16) (96% - 99%)    Parameters below as of 03 Oct 2022 12:15  Patient On (Oxygen Delivery Method): room air      Daily Height in cm: 177.8 (03 Oct 2022 12:16)    Daily Weight in k.1 (03 Oct 2022 05:46)I&O's Summary      GENERAL:  Appears stated age,   HEENT:  NC/AT,  conjunctivae clear and pink,  CHEST:  Full & symmetric excursion, no increased effort, breath sounds clear  HEART:  Regular rhythm, S1, S2, no murmur  ABDOMEN:  Soft, non-tender, non-distended, normoactive bowel sounds,   SKIN:  No rash/warm/dry  NEURO:  Alert, oriented,  LABS:                        8.6    4.33  )-----------( 326      ( 03 Oct 2022 06:30 )             28.1     10-03    132<L>  |  95<L>  |  59<H>  ----------------------------<  184<H>  5.2   |  24  |  5.11<H>    Ca    9.0      03 Oct 2022 11:58      PT/INR - ( 03 Oct 2022 06:30 )   PT: 18.6 sec;   INR: 1.60 ratio         PTT - ( 03 Oct 2022 06:30 )  PTT:37.3 sec    amylase   lipase  RADIOLOGY & ADDITIONAL TESTS:

## 2022-10-03 NOTE — PROGRESS NOTE ADULT - SUBJECTIVE AND OBJECTIVE BOX
No distress    Vital Signs Last 24 Hrs  T(C): 36.8 (10-03-22 @ 12:16), Max: 37.2 (10-03-22 @ 08:08)  T(F): 98.2 (10-03-22 @ 12:16), Max: 98.9 (10-03-22 @ 08:08)  HR: 60 (10-03-22 @ 12:16) (58 - 71)  BP: 162/74 (10-03-22 @ 12:16) (133/69 - 162/74)  RR: 16 (10-03-22 @ 12:16) (16 - 16)  SpO2: 96% (10-03-22 @ 12:16) (96% - 99%)    s1s2  b/l air entry  soft, ND  no edema                                                                                     8.6    4.33  )-----------( 326      ( 03 Oct 2022 06:30 )             28.1     03 Oct 2022 11:58    132    |  95     |  59     ----------------------------<  184    5.2     |  24     |  5.11     Ca    9.0        03 Oct 2022 11:58    acetaminophen     Tablet .. 650 milliGRAM(s) Oral every 6 hours PRN  amLODIPine   Tablet 10 milliGRAM(s) Oral daily  artificial  tears Solution 2 Drop(s) Both EYES every 6 hours  brivaracetam 50 milliGRAM(s) Oral <User Schedule>  brivaracetam 50 milliGRAM(s) Oral <User Schedule>  carvedilol 12.5 milliGRAM(s) Oral every 12 hours  chlorhexidine 2% Cloths 1 Application(s) Topical <User Schedule>  dextrose 5%. 1000 milliLiter(s) IV Continuous <Continuous>  dextrose 5%. 1000 milliLiter(s) IV Continuous <Continuous>  dextrose 50% Injectable 25 Gram(s) IV Push once  dextrose 50% Injectable 12.5 Gram(s) IV Push once  dextrose 50% Injectable 25 Gram(s) IV Push once  dextrose Oral Gel 15 Gram(s) Oral once PRN  doxazosin 4 milliGRAM(s) Oral <User Schedule>  epoetin cortney-epbx (RETACRIT) Injectable 62046 Unit(s) IV Push <User Schedule>  finasteride 5 milliGRAM(s) Oral daily  furosemide    Tablet 80 milliGRAM(s) Oral daily  glucagon  Injectable 1 milliGRAM(s) IntraMuscular once  insulin glargine Injectable (LANTUS) 5 Unit(s) SubCutaneous at bedtime  insulin lispro (ADMELOG) corrective regimen sliding scale   SubCutaneous three times a day before meals  insulin lispro (ADMELOG) corrective regimen sliding scale   SubCutaneous at bedtime  insulin lispro Injectable (ADMELOG) 5 Unit(s) SubCutaneous three times a day before meals  levothyroxine 50 MICROGram(s) Oral daily  multivitamin 1 Tablet(s) Oral daily  mycophenolate mofetil 500 milliGRAM(s) Oral <User Schedule>  nystatin    Suspension 345013 Unit(s) Oral four times a day  pantoprazole    Tablet 40 milliGRAM(s) Oral <User Schedule>  phenytoin   Capsule 200 milliGRAM(s) Oral two times a day  polyethylene glycol 3350 17 Gram(s) Oral daily PRN  predniSONE   Tablet 10 milliGRAM(s) Oral daily  ramelteon 8 milliGRAM(s) Oral at bedtime  sevelamer carbonate 800 milliGRAM(s) Oral three times a day with meals  sirolimus 7 milliGRAM(s) Oral <User Schedule>  trimethoprim   80 mG/sulfamethoxazole 400 mG 1 Tablet(s) Oral daily  valGANciclovir 450 milliGRAM(s) Oral <User Schedule>  warfarin 7 milliGRAM(s) Oral once    A/P:    S/p complicated hospital course as per HPI  S/p DDRT 4/21/22, required HD, came off HD since 5/5/22, back on HD since 7/27/22  Continue transplant meds per transplant team recommendations  Avoid nephrotoxins  Epoetin w/HD 3 x week  TMP as able  Renal diet  Worsening anemia, guaiac positive in pt on Coumadin  EGD/colon today   S/p Bld tx w/HD on Sat  Next HD on Tuesday    [phone]

## 2022-10-03 NOTE — BRIEF OPERATIVE NOTE - COMMENTS
Await biopsy report  Follow Hb  Can consider capsule study of small bowel to rule out bleeding source

## 2022-10-03 NOTE — BRIEF OPERATIVE NOTE - OPERATION/FINDINGS
Upper Endoscopy    Esophagus normal to GE junction  GE junction at 42 cm and appeared normal, random biopsy taken.  Gastric antrum mild gastritis, random biopsy taken  Duodenum normal to the second portion, random biopsy taken in the second portion    After he was turned and the Colonoscope was inserted per rectum and advanced to the cecum and into the terminal ieum.    The terminal ileum was normal\  The cecum was normal  The ascending colon was normal on forward and retroflexed views.  The Transverse colon was normal  The Descending colon was normal  The sigmoid colon was normal  The rectum was normal  The retroflexed view of the rectum showed small internal hemorrhoids.

## 2022-10-04 NOTE — PROGRESS NOTE ADULT - SUBJECTIVE AND OBJECTIVE BOX
SUBJECTIVE/ROS:  Patient seen and evaluated sitting up in WC  Had colonoscope yesterday - relay results to patient  overall feeling okay.  Participating in therapy  HD today  Denies CP, palpitation, SOB, or cough  No HA, dizziness, or lightheadedness  no nausea or abd pain.  LBM 10/3  minimal to none urine output    Vital Signs Last 24 Hrs  T(C): 36.3 (03 Oct 2022 21:39), Max: 36.8 (03 Oct 2022 12:15)  T(F): 97.4 (03 Oct 2022 21:39), Max: 98.2 (03 Oct 2022 12:15)  HR: 64 (04 Oct 2022 05:05) (60 - 64)  BP: 167/69 (04 Oct 2022 05:05) (153/62 - 167/69)  RR: 16 (03 Oct 2022 21:39) (16 - 16)  SpO2: 97% (03 Oct 2022 21:39) (96% - 97%) room air    Daily -  Admission weight () 69.8 kg        Daily Weight in k.1 (03 Oct 2022 05:46)    Gen - NAD, comfortable on RA  HEENT - NCAT, EOMI, MMM  Neck - Supple, No limited ROM  Pulm - diminished to bilateral base  Cardiovascular - warm and well perfused  Chest - left chest wall permcath  Abdomen -  distended, but nontender, tympanic  Extremities - No Cyanosis, no clubbing, no edema, no calf tenderness  Neuro-     Cognitive - awake, alert, oriented to person and place. Delayed processing/response     Cranial Nerves - CN 2-12 intact. No facial asymmetry, Tongue midline, EOMI, Shoulder shrug intact     Motor -                     LEFT    UE - 3+5                    RIGHT UE - 3-/5                    LEFT    LE - KE 4/5                    RIGHT LE - KE 4/5        Sensory - Intact to LT bilaterally  MSK: generalized weakness  Skin:  stage 2 pressure ulcer to sacrum 0.3 x 0.2,  discoloration to lateral RLQ of abdomen    RECENT LABS:                             8.6    4.41  )-----------( 326      ( 04 Oct 2022 06:00 )             28.0     10-03    132<L>  |  95<L>  |  59<H>  ----------------------------<  184<H>  5.2   |  24  |  5.11<H>    Ca    9.0      03 Oct 2022 11:58    PT/INR - ( 03 Oct 2022 06:30 )   PT: 18.6 sec;   INR: 1.60 ratio    PTT - ( 03 Oct 2022 06:30 )  PTT:37.3 sec    FOBT - positive ()    Colonoscopy 10/3  Upper Endoscopy    Esophagus normal to GE junction  GE junction at 42 cm and appeared normal, random biopsy taken.  Gastric antrum mild gastritis, random biopsy taken  Duodenum normal to the second portion, random biopsy taken in the second portion    After he was turned and the Colonoscope was inserted per rectum and advanced to the cecum and into the terminal ieum.    The terminal ileum was normal\parThe cecum was normal  The ascending colon was normal on forward and retroflexed views.  The Transverse colon was normal  The Descending colon was normal  The sigmoid colon was normal  The rectum was normal  The retroflexed view of the rectum showed small internal hemorrhoids.      Xray Chest 1 View-PORTABLE IMMEDIATE (Xray Chest 1 View-PORTABLE IMMEDIATE .) (22 @ 12:26) >  Decreasing bilateral perihilar mild diffuse airspace   disease.. No pneumothorax. No large effusion.    Xray Chest 1 View- PORTABLE-Routine (Xray Chest 1 View- PORTABLE-Routine .) (22 @ 12:04) >  Interval exchange of the dialysis catheter. No evidence of pneumothorax.   No other significant change.    Allergies  No Known Allergies    MEDICATIONS  (STANDING):  amLODIPine   Tablet 10 milliGRAM(s) Oral daily  artificial  tears Solution 2 Drop(s) Both EYES every 6 hours  brivaracetam 50 milliGRAM(s) Oral <User Schedule>  brivaracetam 50 milliGRAM(s) Oral <User Schedule>  carvedilol 12.5 milliGRAM(s) Oral every 12 hours  chlorhexidine 2% Cloths 1 Application(s) Topical <User Schedule>  dextrose 5%. 1000 milliLiter(s) (50 mL/Hr) IV Continuous <Continuous>  dextrose 5%. 1000 milliLiter(s) (100 mL/Hr) IV Continuous <Continuous>  dextrose 50% Injectable 25 Gram(s) IV Push once  dextrose 50% Injectable 25 Gram(s) IV Push once  dextrose 50% Injectable 12.5 Gram(s) IV Push once  doxazosin 4 milliGRAM(s) Oral <User Schedule>  epoetin cortney-epbx (RETACRIT) Injectable 75488 Unit(s) IV Push <User Schedule>  finasteride 5 milliGRAM(s) Oral daily  furosemide    Tablet 80 milliGRAM(s) Oral daily  glucagon  Injectable 1 milliGRAM(s) IntraMuscular once  insulin glargine Injectable (LANTUS) 5 Unit(s) SubCutaneous at bedtime  insulin lispro (ADMELOG) corrective regimen sliding scale   SubCutaneous three times a day before meals  insulin lispro (ADMELOG) corrective regimen sliding scale   SubCutaneous at bedtime  insulin lispro Injectable (ADMELOG) 5 Unit(s) SubCutaneous three times a day before meals  levothyroxine 50 MICROGram(s) Oral daily  multivitamin 1 Tablet(s) Oral daily  mycophenolate mofetil 500 milliGRAM(s) Oral <User Schedule>  nystatin    Suspension 596131 Unit(s) Oral four times a day  pantoprazole    Tablet 40 milliGRAM(s) Oral <User Schedule>  phenytoin   Capsule 200 milliGRAM(s) Oral two times a day  predniSONE   Tablet 10 milliGRAM(s) Oral daily  ramelteon 8 milliGRAM(s) Oral at bedtime  sevelamer carbonate 800 milliGRAM(s) Oral three times a day with meals  sirolimus 7 milliGRAM(s) Oral <User Schedule>  trimethoprim   80 mG/sulfamethoxazole 400 mG 1 Tablet(s) Oral daily  valGANciclovir 450 milliGRAM(s) Oral <User Schedule>    MEDICATIONS  (PRN):  acetaminophen     Tablet .. 650 milliGRAM(s) Oral every 6 hours PRN Temp greater or equal to 38C (100.4F), Mild Pain (1 - 3)  dextrose Oral Gel 15 Gram(s) Oral once PRN Blood Glucose LESS THAN 70 milliGRAM(s)/deciliter  polyethylene glycol 3350 17 Gram(s) Oral daily PRN Constipation   SUBJECTIVE/ROS:  Patient seen and evaluated sitting up in WC  Had colonoscope yesterday - relay results to patient  overall feeling okay.  Participating in therapy  HD today  Denies CP, palpitation, SOB, or cough  No HA, dizziness, or lightheadedness  no nausea or abd pain.  LBM 10/3  minimal to none urine output    Interval review by Dr Frankie Long , transplant physician--recs to d/c valcyte in the setting of anemia  Labs INR sub therapeutic, c/w coumadin  aim to dc once inr therapeutic   Family wants to fund the co payment for sirolimus    Vital Signs Last 24 Hrs  T(C): 36.3 (03 Oct 2022 21:39), Max: 36.8 (03 Oct 2022 12:15)  T(F): 97.4 (03 Oct 2022 21:39), Max: 98.2 (03 Oct 2022 12:15)  HR: 64 (04 Oct 2022 05:05) (60 - 64)  BP: 167/69 (04 Oct 2022 05:05) (153/62 - 167/69)  RR: 16 (03 Oct 2022 21:39) (16 - 16)  SpO2: 97% (03 Oct 2022 21:39) (96% - 97%) room air    Daily -  Admission weight () 69.8 kg        Daily Weight in k.1 (03 Oct 2022 05:46)    Gen - NAD, comfortable on RA  HEENT - NCAT, EOMI, MMM  Neck - Supple, No limited ROM  Pulm - diminished to bilateral base  Cardiovascular - warm and well perfused  Chest - left chest wall permcath  Abdomen -  distended, but nontender, tympanic  Extremities - No Cyanosis, no clubbing, no edema, no calf tenderness  Neuro-     Cognitive - awake, alert, oriented to person and place. Delayed processing/response     Cranial Nerves - CN 2-12 intact. No facial asymmetry, Tongue midline, EOMI, Shoulder shrug intact     Motor -                     LEFT    UE - 3+5                    RIGHT UE - 3-/5                    LEFT    LE - KE 4/5                    RIGHT LE - KE 4/5        Sensory - Intact to LT bilaterally  MSK: generalized weakness  Skin:  stage 2 pressure ulcer to sacrum 0.3 x 0.2,  discoloration to lateral RLQ of abdomen    Therapy--observed, engaging in therapy  Making improvements with ADLs, feeding, but still requiring assistance for most ADLs    RECENT LABS:                             8.6    4.41  )-----------( 326      ( 04 Oct 2022 06:00 )             28.0     10-03    132<L>  |  95<L>  |  59<H>  ----------------------------<  184<H>  5.2   |  24  |  5.11<H>    Ca    9.0      03 Oct 2022 11:58    PT/INR - ( 03 Oct 2022 06:30 )   PT: 18.6 sec;   INR: 1.60 ratio    PTT - ( 03 Oct 2022 06:30 )  PTT:37.3 sec    FOBT - positive ()    Colonoscopy 10/3  Upper Endoscopy    Esophagus normal to GE junction  GE junction at 42 cm and appeared normal, random biopsy taken.  Gastric antrum mild gastritis, random biopsy taken  Duodenum normal to the second portion, random biopsy taken in the second portion    After he was turned and the Colonoscope was inserted per rectum and advanced to the cecum and into the terminal ieum.    The terminal ileum was normal\parThe cecum was normal  The ascending colon was normal on forward and retroflexed views.  The Transverse colon was normal  The Descending colon was normal  The sigmoid colon was normal  The rectum was normal  The retroflexed view of the rectum showed small internal hemorrhoids.      Xray Chest 1 View-PORTABLE IMMEDIATE (Xray Chest 1 View-PORTABLE IMMEDIATE .) (22 @ 12:26) >  Decreasing bilateral perihilar mild diffuse airspace   disease.. No pneumothorax. No large effusion.    Xray Chest 1 View- PORTABLE-Routine (Xray Chest 1 View- PORTABLE-Routine .) (22 @ 12:04) >  Interval exchange of the dialysis catheter. No evidence of pneumothorax.   No other significant change.    Allergies  No Known Allergies    MEDICATIONS  (STANDING):  amLODIPine   Tablet 10 milliGRAM(s) Oral daily  artificial  tears Solution 2 Drop(s) Both EYES every 6 hours  brivaracetam 50 milliGRAM(s) Oral <User Schedule>  brivaracetam 50 milliGRAM(s) Oral <User Schedule>  carvedilol 12.5 milliGRAM(s) Oral every 12 hours  chlorhexidine 2% Cloths 1 Application(s) Topical <User Schedule>  dextrose 5%. 1000 milliLiter(s) (50 mL/Hr) IV Continuous <Continuous>  dextrose 5%. 1000 milliLiter(s) (100 mL/Hr) IV Continuous <Continuous>  dextrose 50% Injectable 25 Gram(s) IV Push once  dextrose 50% Injectable 25 Gram(s) IV Push once  dextrose 50% Injectable 12.5 Gram(s) IV Push once  doxazosin 4 milliGRAM(s) Oral <User Schedule>  epoetin cortney-epbx (RETACRIT) Injectable 81555 Unit(s) IV Push <User Schedule>  finasteride 5 milliGRAM(s) Oral daily  furosemide    Tablet 80 milliGRAM(s) Oral daily  glucagon  Injectable 1 milliGRAM(s) IntraMuscular once  insulin glargine Injectable (LANTUS) 5 Unit(s) SubCutaneous at bedtime  insulin lispro (ADMELOG) corrective regimen sliding scale   SubCutaneous three times a day before meals  insulin lispro (ADMELOG) corrective regimen sliding scale   SubCutaneous at bedtime  insulin lispro Injectable (ADMELOG) 5 Unit(s) SubCutaneous three times a day before meals  levothyroxine 50 MICROGram(s) Oral daily  multivitamin 1 Tablet(s) Oral daily  mycophenolate mofetil 500 milliGRAM(s) Oral <User Schedule>  nystatin    Suspension 633713 Unit(s) Oral four times a day  pantoprazole    Tablet 40 milliGRAM(s) Oral <User Schedule>  phenytoin   Capsule 200 milliGRAM(s) Oral two times a day  predniSONE   Tablet 10 milliGRAM(s) Oral daily  ramelteon 8 milliGRAM(s) Oral at bedtime  sevelamer carbonate 800 milliGRAM(s) Oral three times a day with meals  sirolimus 7 milliGRAM(s) Oral <User Schedule>  trimethoprim   80 mG/sulfamethoxazole 400 mG 1 Tablet(s) Oral daily  valGANciclovir 450 milliGRAM(s) Oral <User Schedule>    MEDICATIONS  (PRN):  acetaminophen     Tablet .. 650 milliGRAM(s) Oral every 6 hours PRN Temp greater or equal to 38C (100.4F), Mild Pain (1 - 3)  dextrose Oral Gel 15 Gram(s) Oral once PRN Blood Glucose LESS THAN 70 milliGRAM(s)/deciliter  polyethylene glycol 3350 17 Gram(s) Oral daily PRN Constipation

## 2022-10-04 NOTE — PROGRESS NOTE ADULT - SUBJECTIVE AND OBJECTIVE BOX
S/p stable HD today    Vital Signs Last 24 Hrs  T(C): 36.7 (10-04-22 @ 15:00), Max: 36.8 (10-04-22 @ 11:39)  T(F): 98 (10-04-22 @ 15:00), Max: 98.3 (10-04-22 @ 11:39)  HR: 60 (10-04-22 @ 15:00) (60 - 64)  BP: 159/73 (10-04-22 @ 15:00) (133/63 - 167/69)  RR: 20 (10-04-22 @ 15:00) (16 - 20)  SpO2: 100% (10-04-22 @ 15:00) (97% - 100%)    s1s2  b/l air entry  soft, ND  no edema                                                                                             8.5    4.98  )-----------( 339      ( 04 Oct 2022 15:28 )             27.5     04 Oct 2022 15:28    135    |  95     |  75     ----------------------------<  83     4.9     |  25     |  6.23     Ca    9.2        04 Oct 2022 15:28  Phos  4.0       04 Oct 2022 15:28    TPro  7.1    /  Alb  2.1    /  TBili  0.4    /  DBili  x      /  AST  20     /  ALT  26     /  AlkPhos  581    04 Oct 2022 15:28    LIVER FUNCTIONS - ( 04 Oct 2022 15:28 )  Alb: 2.1 g/dL / Pro: 7.1 g/dL / ALK PHOS: 581 U/L / ALT: 26 U/L / AST: 20 U/L / GGT: x           PT/INR - ( 04 Oct 2022 15:28 )   PT: 22.3 sec;   INR: 1.91 ratio      acetaminophen     Tablet .. 650 milliGRAM(s) Oral every 6 hours PRN  amLODIPine   Tablet 10 milliGRAM(s) Oral daily  artificial  tears Solution 2 Drop(s) Both EYES every 6 hours  brivaracetam 50 milliGRAM(s) Oral <User Schedule>  brivaracetam 50 milliGRAM(s) Oral <User Schedule>  carvedilol 12.5 milliGRAM(s) Oral every 12 hours  chlorhexidine 2% Cloths 1 Application(s) Topical <User Schedule>  dextrose 5%. 1000 milliLiter(s) IV Continuous <Continuous>  dextrose 5%. 1000 milliLiter(s) IV Continuous <Continuous>  dextrose 50% Injectable 25 Gram(s) IV Push once  dextrose 50% Injectable 12.5 Gram(s) IV Push once  dextrose 50% Injectable 25 Gram(s) IV Push once  dextrose Oral Gel 15 Gram(s) Oral once PRN  doxazosin 4 milliGRAM(s) Oral <User Schedule>  epoetin cortney-epbx (RETACRIT) Injectable 65296 Unit(s) IV Push <User Schedule>  finasteride 5 milliGRAM(s) Oral daily  furosemide    Tablet 80 milliGRAM(s) Oral daily  glucagon  Injectable 1 milliGRAM(s) IntraMuscular once  insulin glargine Injectable (LANTUS) 5 Unit(s) SubCutaneous at bedtime  insulin lispro (ADMELOG) corrective regimen sliding scale   SubCutaneous at bedtime  insulin lispro (ADMELOG) corrective regimen sliding scale   SubCutaneous three times a day before meals  insulin lispro Injectable (ADMELOG) 5 Unit(s) SubCutaneous three times a day before meals  levothyroxine 50 MICROGram(s) Oral daily  multivitamin 1 Tablet(s) Oral daily  mycophenolate mofetil 500 milliGRAM(s) Oral <User Schedule>  nystatin    Suspension 115222 Unit(s) Oral four times a day  pantoprazole    Tablet 40 milliGRAM(s) Oral <User Schedule>  phenytoin   Capsule 200 milliGRAM(s) Oral two times a day  polyethylene glycol 3350 17 Gram(s) Oral daily PRN  predniSONE   Tablet 10 milliGRAM(s) Oral daily  ramelteon 8 milliGRAM(s) Oral at bedtime  sevelamer carbonate 800 milliGRAM(s) Oral three times a day with meals  sirolimus 7 milliGRAM(s) Oral <User Schedule>  trimethoprim   80 mG/sulfamethoxazole 400 mG 1 Tablet(s) Oral daily  valGANciclovir 450 milliGRAM(s) Oral <User Schedule>  warfarin 5 milliGRAM(s) Oral once    A/P:    S/p complicated hospital course as per HPI  S/p DDRT 4/21/22, required HD, came off HD since 5/5/22, back on HD since 7/27/22  Continue transplant meds per transplant team recommendations  Avoid nephrotoxins  Epoetin w/HD 3 x week  TMP as able  Renal diet  Pt's op HD schedule will be MWF  If otherwise ready/stable, can be d/c tomorrow from renal pov w/next HD on Friday   D/w rehab team    [phone]

## 2022-10-04 NOTE — CHART NOTE - NSCHARTNOTESELECT_GEN_ALL_CORE
IPOC
Nutrition Services
IDT 9/19/Event Note
IDT 9/26/Event Note
Nutrition Services

## 2022-10-04 NOTE — PROVIDER CONTACT NOTE (OTHER) - ASSESSMENT
did not want to eat breakfast. Blood sugar 229mg/dL
Pt complained of being hot and appeared restless. BG reading of 92.
accucheck 83mg/dL

## 2022-10-04 NOTE — PROVIDER CONTACT NOTE (OTHER) - SITUATION
pt refused breakfast
accucheck 83mg/dL
Patient BG level of 92 at bedtime and due for lantus 3units dose.

## 2022-10-04 NOTE — PROGRESS NOTE ADULT - NS ATTEND AMEND GEN_ALL_CORE FT
Seen and examined     No interval med complaint  Tolerating diet, but requiring assistance during feeding, hence Restorative dining order commenced   Vitals normal,  INR therapeutic, but down trending from high value    IDT--Decreased endurance, executive fxn deficits (family already asked to d/c SLP therapy),   Ambulating very minimal distance     Continue PT/OT  Discuss care with family to explore discharge  plan/extent of care available at home as patient continues to require assistance for ADLs  Continue warfarin
Seen and examined    Patient making progress in some areas--oral intake, energy levels and motor function  However gait impairment and executive fxn deficits still apparent    Clinically stable   inr therapeutic  Wt loss probably related to HD    Family already made arrangements for home dc    Continue PT/OT  Continue coumadin and INR monitoring   Liaison with transplant team   Continue routine HD
Seen and examined    Was getting bedside OT prior to review  Patient has no acute med complaint   But he still has overt memory deficits, unable to recall date last BM and other routine events  Energy levels improved    Vitals normal  Engaging in therapy, feeding self with spoon, but requiring cuing  Protecting airway    INR therapeutic    Continue PT/OT--addressing poor endurance, ambulation and gait abnormality  Liaising with family for dc planning   continue warfarin
Seen and examined with NP    Observed eating with set up assistance in OT session  Good engagement ,but still has marked executive fxn deficits    Motor function improved, but balance still impaired    Reports improved energy  No pain symptoms    Lab INR therapeutic    Continue PT/OT  Continue therapy  D/C planning with family
Seen and examined    Patient making progress in some areas--oral intake, energy levels and motor function  However gait impairment and executive fxn deficits still apparent    Clinically stable   inr therapeutic  Wt loss probably related to HD    Family already made arrangements for home dc    Continue PT/OT  Continue coumadin and INR monitoring   Liaison with transplant team   Continue routine HD
Seen and examined, findings as noted, note revised    Clinically stable  Transplant team interval review and recs noted    oral intake continues to improved    Clinically stable     Labs INR sub therapeutic, c/w coumadin    Continue PT/OT  INR monitoring, c/w coumadin  Aim to dc once inr therapeutic   Family wants to fund the co payment for sirolimus  DC planning
Seen and examined     No significant interval med events  Has successful family training yesterday  Family has made extensive arrangements for supervision post discharge    Tolerating oral diet, sustained improvement of oral intake  Has been requiring oxy NC for intermittent oxy desaturation mainly on HD days     Continue PT/OT  DC planning   Continue INR monitoring and coumadin
Seen and examined    No acute med complaint  Reports normal BM  No dyspnea    Abd full to distended  ? last urine void, although patient is anuric    Clinically stable  Alert and awake, engaging well as observed in therapy    Observation in therapy as noted    Continue PT/OT  Bladder scan to r/o urinary retention  Continue liaison with transplant team
Seen and examined, findings as noted    No acute med complaint   Has loose BM while on bowel prep for endoscopy    Remained stable   Endoscopy today--gastritis and internal hemorrhoids, some biopsies taken      Continue PT/OT  Await approval of patient's anti epileptic meds prior to dc   Monitor Hb and any further bleeding
Seen and examined with NP    No acute med complaint   Oral intake is normal    Engaging in therapy, deficits in multiple domains as noted, but remains motivated    Clinically stable  Chest --diffusely decreased air entry   Has residual effusion    Due routine HD today    Spoke with son Dr Perez, stated that he is in contact with transplant team, they are awaiting approval for sirolimus and some meds, to ensure safe DC  He asked for 3 days supply of this medication and AED med in case authorization from insurance is not obtained prior to dc tomorrow  (Transplant team working on authorization)    I d/w hosp pharmacy, they are checking of 3 days supply of meds could be given to patient prior to dc  They will give a response before the end of the day    Continue PT/OT  Continue coumadin and will d/c on slightly increased dose  Await response from pharmacy  Continue liaison with Transplant team
Seen and examined with NP,  Findings as noted    Previously planned admission today--deferred    Several significant interval events noted  FOBT +VE, --Plan for bld transfusion tomorrow 10/1, during HD,  extra dose of AED post HD,    Commence heparin bridge, stop coumadin 9/30, commence heparin 10/2    Due colonoscopy 10/3  Neurology consult for alternative to AED-Briviat as insurance declined approval     Continue therapy as tolerated  Hold therapy Monday 10/3 for C/scope   Family and transplant team aware of developments
Seen and examined with NP    Episodic oxy desaturation, fatigue and mildly altered sensorium earlier today  Decreased breath sounds on exam  Abd full,    INR drop, from holding coumadin due to preceeding supra-therapeutic level  No bleeding    Clinically fatigued and frail    HD brought forward to AM hours  Felt better afterwards  Wt sable 69 kg    Transplant team NP details obtained    Recommence therapy PT/OT  Daily wt  Monitor for change of mental state  Sacral stage 2 ulcer, continue monitoring
Seen with NP    No acute med complaint. reports improving energy level and appetite  Still requiring some assistance with ADLs and cueing during therapy    Vitals normal  INR pending    Clinically stable    Continue PT/OT  Discuss discharge disposition with family OK vs home considering multiple cognitive and functional limitation  Continue HD
Seen with NP, findings as noted, revisions made    Patient clinically stable  Observed in therapy--details as noted, but still requiring mod assistance for short distance ambulation    Despite significant executive fxn deficits, family reports that this is long standing and prefer therapy time to be OT/PT only  we have obliged to this    INR rising  Afib and RIJ dvt--oral AC d/darren due to drug interaction with seizure meds, hence on coumadin    Post renal transplant on HD    Labs --CMV -VE    Continue PT/OT 90 mins each  INR tomorrow, coumadin 7.5mg daily, last increase 9/15, Target 2-3  Continue HD  Continue seizure meds   Liaison with transplant team
Seen and examined with NP  No acute med complaint   Engaging in therapy    Seen with son and wife who came for previously planned dc, which was deferred due to anemia and issues with anti organ rejection meds  Explained reason for deferred dc to tomorrow, which has been d/w patient's other son    Repeat labs Hb 8.7 from 7.4  (7.4 sample taking during HD )    INR therapeutic    Continue PT/OT  DC tomorrow once issues with meds resolved
Seen and examined with NP  Patient has no acute med complaint  Asked why he is not making urine  I explained that his kidney diease is the cause, and bladder scan has been unremarkable for volume,  This was reassuring to him    Observed therapy--PT/SLP  Engaging, but still had overt deficits as already noted    Clinically stable, wt stable   BP stable  Energy level improving  oral intake improving    Abd--full. non tender, hyprepigmentation RLQ is non progressive   Labs INR rising 1.90    Continue Therapy  Increased coumadin dose  Await result of pending labs as recs by transplant team  Daily INR
Seen with NP    Patient had no interval complaint   Oral intake is very good  Engaging in therapy    Clinically stable  INR therapeutic at last test    Continue PT/OT  Discharge planning
Seen and examined, findings as noted, note revised    Patient clinically improved today, alert and engaging  Has successful HD session yesterday    INR low revised coumadin dose    Bed mobility with supervision today  Antigravity all extremities     Abd soft and not distended    Continue PT/OT  Coumadin dose revised  Daily INR

## 2022-10-04 NOTE — PROGRESS NOTE ADULT - NS ATTEND OPT1 GEN_ALL_CORE
I attest my time as attending is greater than 50% of the total combined time spent on qualifying patient care activities by the PA/NP and attending.

## 2022-10-04 NOTE — PROGRESS NOTE ADULT - NS ATTEND BILL GEN_ALL_CORE
Attending to bill

## 2022-10-04 NOTE — PROGRESS NOTE ADULT - NS_MD_PANP_GEN_ALL_CORE

## 2022-10-04 NOTE — CHART NOTE - NSCHARTNOTEFT_GEN_A_CORE
IDT 9/19 - Lead by Dr. Yip  lives with spouse and 2 sons in  with 6 JARRETT, 1st [place] setup.  PTA needed some assistance with ADLs  Functional status: total for toileting, mod - max A with self care, needs lots of cues, posterolateral lean.  Slight/slow gains.  Mod A amb with WC follow.  Mod A x 1 using RW, min A with sit to stand to chair.  Amb 20' mod A x 1 WC follow.  Goal: min A/needs hands on A, mod A shower transfer  SLP: discharge as per family request   TDD: 9/29 need 24/7 assistance (has 6 JARRETT) Home vs OK
IDT 9/26 - Lead by Dr. Yip  lives with spouse and 2 sons in  with 6 JARRETT, 1st [place] setup.  PTA needed some assistance with ADLs  Functional status: mod A x 1 with stand piovt with and wo RW (needs cutes), mod A to initiate task.  Eating with incidental Assist.  Transfers: sit->stand min A, but mod A + cues with stand to sit, mod A to WC. Ambulated 50' RW min + WCF  Goal: min A/needs hands on A, mod A shower transfer  SLP: discharge as per family   TDD: 9/29 Home need 24/7 assistance.  Family putting in ramp on Wednesday (9/28)
Nutrition Follow Up Note  Hospital Course   (Per Electronic Medical Record)    Source:  Patient [X]  Family Member [X]   Medical Record [X]      Diet:   Easy to chew cc/ evening snack   Supplement Nephro 2 times daily recommend supplement 3x daily   Patients family member states he is consuming 100% of his plate and all of supplements provided  Pt's family member states he is not experiencing any N/V/D/C at this time   Pt's family member states Patients weight was 156 on 10/4   Pt's family member states Patient is experiencing normal bm     Enteral/Parenteral Nutrition: Not Applicable    Current Weight: 131lb on 10/4     Pertinent Medications: MEDICATIONS  (STANDING):  amLODIPine   Tablet 10 milliGRAM(s) Oral daily  artificial  tears Solution 2 Drop(s) Both EYES every 6 hours  brivaracetam 50 milliGRAM(s) Oral <User Schedule>  brivaracetam 50 milliGRAM(s) Oral <User Schedule>  carvedilol 12.5 milliGRAM(s) Oral every 12 hours  chlorhexidine 2% Cloths 1 Application(s) Topical <User Schedule>  dextrose 5%. 1000 milliLiter(s) (50 mL/Hr) IV Continuous <Continuous>  dextrose 5%. 1000 milliLiter(s) (100 mL/Hr) IV Continuous <Continuous>  dextrose 50% Injectable 25 Gram(s) IV Push once  dextrose 50% Injectable 25 Gram(s) IV Push once  dextrose 50% Injectable 12.5 Gram(s) IV Push once  doxazosin 4 milliGRAM(s) Oral <User Schedule>  epoetin cortney-epbx (RETACRIT) Injectable 65226 Unit(s) IV Push <User Schedule>  finasteride 5 milliGRAM(s) Oral daily  furosemide    Tablet 80 milliGRAM(s) Oral daily  glucagon  Injectable 1 milliGRAM(s) IntraMuscular once  insulin glargine Injectable (LANTUS) 5 Unit(s) SubCutaneous at bedtime  insulin lispro (ADMELOG) corrective regimen sliding scale   SubCutaneous three times a day before meals  insulin lispro (ADMELOG) corrective regimen sliding scale   SubCutaneous at bedtime  insulin lispro Injectable (ADMELOG) 5 Unit(s) SubCutaneous three times a day before meals  levothyroxine 50 MICROGram(s) Oral daily  multivitamin 1 Tablet(s) Oral daily  mycophenolate mofetil 500 milliGRAM(s) Oral <User Schedule>  nystatin    Suspension 131946 Unit(s) Oral four times a day  pantoprazole    Tablet 40 milliGRAM(s) Oral <User Schedule>  phenytoin   Capsule 200 milliGRAM(s) Oral two times a day  predniSONE   Tablet 10 milliGRAM(s) Oral daily  ramelteon 8 milliGRAM(s) Oral at bedtime  sevelamer carbonate 800 milliGRAM(s) Oral three times a day with meals  sirolimus 7 milliGRAM(s) Oral <User Schedule>  trimethoprim   80 mG/sulfamethoxazole 400 mG 1 Tablet(s) Oral daily  valGANciclovir 450 milliGRAM(s) Oral <User Schedule>  warfarin 5 milliGRAM(s) Oral once    MEDICATIONS  (PRN):  acetaminophen     Tablet .. 650 milliGRAM(s) Oral every 6 hours PRN Temp greater or equal to 38C (100.4F), Mild Pain (1 - 3)  dextrose Oral Gel 15 Gram(s) Oral once PRN Blood Glucose LESS THAN 70 milliGRAM(s)/deciliter  polyethylene glycol 3350 17 Gram(s) Oral daily PRN Constipation      Pertinent Labs:  10-03 Na132 mmol/L<L> Glu 184 mg/dL<H> K+ 5.2 mmol/L Cr  5.11 mg/dL<H> BUN 59 mg/dL<H> 09-30 Phos 4.6 mg/dL<H> 09-28 Alb 1.9 g/dL<L>        Skin: Stage 2 pressure injury on sacrum     Edema: None    Last Bowel Movement: n/a    Estimated Needs:   [X] No Change Since Previous Assessment    Previous Nutrition Diagnosis:   severe malnutrition     Interventions:   1. Increase supplement to 3x daily from 2x  2. Recommend POC    Monitoring & Evaluation:   [X] Weights   [X] PO Intake   [X] Skin Integrity   [X] Follow Up (Per Protocol)  [X] Tolerance to Diet Prescription   [X] Other: Labs & POCT    Registered Dietitian/Nutritionist Remains Available.  Tashi Alcantar RDN    Phone# [phone]
Nutrition Follow Up Note  Hospital Course   (Per Electronic Medical Record)    Source:  Patient [X]  Medical Record [X]      Diet:   Consistent Carbohydrate Low Phosphorus - Easy to Chew (IDDSI Level 7) Diet w/ Thin Liquids (IDDSI Level 0) & 1,000ml/day Fluid Restriction   Tolerates Diet Consistency Well  No Chewing/Swallowing Difficulties  No Recent Nausea, Vomiting, Diarrhea or Constipation (as Per Patient)  Consumes % of Meals Usually (as Per Documentation) - States Good PO Intake/Appetite (Per Patient)   on Nepro 8oz BID (Provides 850kcal & 38grams of Protein)  Patient Takes Nutrition Supplement Well  Education Provided on Need for Supplementation & Blood Glucose Management   Obtained Food Preferences from Patient    Enteral/Parenteral Nutrition: Not Applicable    Current Weight: 138.8lb on 9/19  Weight Fluctuates   Obtain New Weight to Confirm     Pertinent Medications: MEDICATIONS  (STANDING):  amLODIPine   Tablet 10 milliGRAM(s) Oral daily  artificial  tears Solution 2 Drop(s) Both EYES every 6 hours  brivaracetam 50 milliGRAM(s) Oral <User Schedule>  brivaracetam 50 milliGRAM(s) Oral <User Schedule>  carvedilol 12.5 milliGRAM(s) Oral every 12 hours  chlorhexidine 2% Cloths 1 Application(s) Topical <User Schedule>  dextrose 5%. 1000 milliLiter(s) (50 mL/Hr) IV Continuous <Continuous>  dextrose 5%. 1000 milliLiter(s) (100 mL/Hr) IV Continuous <Continuous>  dextrose 50% Injectable 25 Gram(s) IV Push once  dextrose 50% Injectable 12.5 Gram(s) IV Push once  dextrose 50% Injectable 25 Gram(s) IV Push once  doxazosin 4 milliGRAM(s) Oral <User Schedule>  epoetin cortney-epbx (RETACRIT) Injectable 45159 Unit(s) IV Push <User Schedule>  finasteride 5 milliGRAM(s) Oral daily  furosemide    Tablet 80 milliGRAM(s) Oral daily  glucagon  Injectable 1 milliGRAM(s) IntraMuscular once  insulin glargine Injectable (LANTUS) 5 Unit(s) SubCutaneous at bedtime  insulin lispro (ADMELOG) corrective regimen sliding scale   SubCutaneous three times a day before meals  insulin lispro (ADMELOG) corrective regimen sliding scale   SubCutaneous at bedtime  insulin lispro Injectable (ADMELOG) 3 Unit(s) SubCutaneous three times a day before meals  levothyroxine 50 MICROGram(s) Oral daily  multivitamin 1 Tablet(s) Oral daily  mycophenolate mofetil 500 milliGRAM(s) Oral <User Schedule>  nystatin    Suspension 085182 Unit(s) Oral four times a day  pantoprazole    Tablet 40 milliGRAM(s) Oral <User Schedule>  phenytoin   Capsule 200 milliGRAM(s) Oral two times a day  predniSONE   Tablet 10 milliGRAM(s) Oral daily  ramelteon 8 milliGRAM(s) Oral at bedtime  sevelamer carbonate 800 milliGRAM(s) Oral three times a day with meals  sirolimus 7 milliGRAM(s) Oral <User Schedule>  trimethoprim   80 mG/sulfamethoxazole 400 mG 1 Tablet(s) Oral daily  valGANciclovir 450 milliGRAM(s) Oral <User Schedule>  warfarin 6 milliGRAM(s) Oral once    MEDICATIONS  (PRN):  acetaminophen     Tablet .. 650 milliGRAM(s) Oral every 6 hours PRN Temp greater or equal to 38C (100.4F), Mild Pain (1 - 3)  dextrose Oral Gel 15 Gram(s) Oral once PRN Blood Glucose LESS THAN 70 milliGRAM(s)/deciliter  polyethylene glycol 3350 17 Gram(s) Oral daily PRN Constipation    Pertinent Labs:  09-19 Na131 mmol/L<L> Glu 131 mg/dL<H> K+ 4.1 mmol/L Cr  6.46 mg/dL<H>  mg/dL<H> 09-19 Phos 6.2 mg/dL<H> 09-19 Alb 2.0 g/dL<L>    POCT (over Last 3 Days) - Ranging from     Skin: Stage 2 Pressure Ulcer on Sacrum    Edema: None Noted (as Per Documentation)     Last Bowel Movement: on 9/18    Estimated Needs:   [X] No Change Since Previous Assessment    Previous Nutrition Diagnosis:   Severe Malnutrition     Nutrition Diagnosis is [X] Ongoing - Continues on Nutrition Supplement & Patient Takes Nutrition Supplement Well    New Nutrition Diagnosis: [X] Not Applicable    Interventions:   1. Education Provided on Need for Supplementation & Blood Glucose Management   2. Recommend Continue Nutrition Plan of Care     Monitoring & Evaluation:   [X] Weights   [X] PO Intake   [X] Skin Integrity   [X] Follow Up (Per Protocol)  [X] Tolerance to Diet Prescription   [X] Other: Labs & POCT    Registered Dietitian/Nutritionist Remains Available.  Tashi Alcantar RDN    Pager #[phone]  Phone# [phone]
Nutrition Follow Up Note  Hospital Course   (Per Electronic Medical Record)    Source:  Patient [X]  Medical Record [X]      Diet: Diet, Consistent Carbohydrate w/Evening Snack:   Easy to Chew (EASYTOCHEW)  1000mL Fluid Restriction ([id number])  No Concentrated Phosphorus  Supplement Feeding Modality:  Oral  Nepro Cans or Servings Per Day:  1       Frequency:  Two Times a day (09-12-22 @ 16:19) [Active]        Nutrition Follow Up: PO intakes noted to vary from 0-100%. Patient currently on a consistent carbohydrate w/ evening snack diet + low phosphorus + 1000 mL fluid restriction w/ easy to chew texture/consistency. Patient consuming Nepro supplement but requested to receive Nepro BID instead of TID (due to wanting broth with lunch on a 1000 mL fluid restriction). Phosphorus elevated: 6.8 (9/12). K+ WNL (9/12). Patient noted with history of T2DM. A1C: 6.0 (6/30). Prednisone noted. Patient noted to be receiving HD. Stage II pressure injury noted at sacrum (9/9).       START BiOM    POCT Blood Glucose.: 167 mg/dL (09-14-22 @ 11:27)  A1C with Estimated Average Glucose Result: 6.4 % (09-04-22 @ 06:08)  A1C with Estimated Average Glucose Result: 6.0 % (06-30-22 @ 05:49)  A1C with Estimated Average Glucose Result: 6.6 % (04-24-22 @ 17:12)  END CHEMFISH  START MEDSACTIVEMEDICATIONS  (STANDING):  amLODIPine   Tablet 10 milliGRAM(s) Oral daily  artificial  tears Solution 2 Drop(s) Both EYES every 6 hours  brivaracetam 50 milliGRAM(s) Oral once  brivaracetam 50 milliGRAM(s) Oral two times a day  carvedilol 12.5 milliGRAM(s) Oral every 12 hours  chlorhexidine 2% Cloths 1 Application(s) Topical <User Schedule>  dextrose 5%. 1000 milliLiter(s) (50 mL/Hr) IV Continuous <Continuous>  dextrose 5%. 1000 milliLiter(s) (100 mL/Hr) IV Continuous <Continuous>  dextrose 50% Injectable 25 Gram(s) IV Push once  dextrose 50% Injectable 12.5 Gram(s) IV Push once  dextrose 50% Injectable 25 Gram(s) IV Push once  doxazosin 4 milliGRAM(s) Oral <User Schedule>  epoetin cortney-epbx (RETACRIT) Injectable 70363 Unit(s) IV Push <User Schedule>  finasteride 5 milliGRAM(s) Oral daily  furosemide    Tablet 80 milliGRAM(s) Oral daily  glucagon  Injectable 1 milliGRAM(s) IntraMuscular once  insulin glargine Injectable (LANTUS) 10 Unit(s) SubCutaneous at bedtime  insulin lispro (ADMELOG) corrective regimen sliding scale   SubCutaneous three times a day before meals  insulin lispro (ADMELOG) corrective regimen sliding scale   SubCutaneous at bedtime  levothyroxine 50 MICROGram(s) Oral daily  melatonin 6 milliGRAM(s) Oral at bedtime  multivitamin 1 Tablet(s) Oral daily  mycophenolate mofetil 500 milliGRAM(s) Oral <User Schedule>  nystatin    Suspension 228930 Unit(s) Oral four times a day  pantoprazole    Tablet 40 milliGRAM(s) Oral <User Schedule>  phenytoin   Capsule 200 milliGRAM(s) Oral two times a day  predniSONE   Tablet 10 milliGRAM(s) Oral daily  sirolimus 7 milliGRAM(s) Oral <User Schedule>  trimethoprim   80 mG/sulfamethoxazole 400 mG 1 Tablet(s) Oral daily  valGANciclovir 450 milliGRAM(s) Oral <User Schedule>  warfarin 7 milliGRAM(s) Oral once    MEDICATIONS  (PRN):  acetaminophen     Tablet .. 650 milliGRAM(s) Oral every 6 hours PRN Temp greater or equal to 38C (100.4F), Mild Pain (1 - 3)  dextrose Oral Gel 15 Gram(s) Oral once PRN Blood Glucose LESS THAN 70 milliGRAM(s)/deciliter  polyethylene glycol 3350 17 Gram(s) Oral daily PRN Constipation  END MEDSACTIVE  START DIETORDEREND DIETORDER  START ADMITDXOther malaise    END ADMITDX  START IOFSEND IOFS  START SKINPUEND SKINPU        Pertinent Medications: MEDICATIONS  (STANDING):  amLODIPine   Tablet 10 milliGRAM(s) Oral daily  artificial  tears Solution 2 Drop(s) Both EYES every 6 hours  brivaracetam 50 milliGRAM(s) Oral two times a day  brivaracetam 50 milliGRAM(s) Oral once  carvedilol 12.5 milliGRAM(s) Oral every 12 hours  chlorhexidine 2% Cloths 1 Application(s) Topical <User Schedule>  dextrose 5%. 1000 milliLiter(s) (100 mL/Hr) IV Continuous <Continuous>  dextrose 5%. 1000 milliLiter(s) (50 mL/Hr) IV Continuous <Continuous>  dextrose 50% Injectable 25 Gram(s) IV Push once  dextrose 50% Injectable 12.5 Gram(s) IV Push once  dextrose 50% Injectable 25 Gram(s) IV Push once  doxazosin 4 milliGRAM(s) Oral <User Schedule>  epoetin cortney-epbx (RETACRIT) Injectable 19256 Unit(s) IV Push <User Schedule>  finasteride 5 milliGRAM(s) Oral daily  furosemide    Tablet 80 milliGRAM(s) Oral daily  glucagon  Injectable 1 milliGRAM(s) IntraMuscular once  insulin glargine Injectable (LANTUS) 10 Unit(s) SubCutaneous at bedtime  insulin lispro (ADMELOG) corrective regimen sliding scale   SubCutaneous three times a day before meals  insulin lispro (ADMELOG) corrective regimen sliding scale   SubCutaneous at bedtime  levothyroxine 50 MICROGram(s) Oral daily  melatonin 6 milliGRAM(s) Oral at bedtime  multivitamin 1 Tablet(s) Oral daily  mycophenolate mofetil 500 milliGRAM(s) Oral <User Schedule>  nystatin    Suspension 136186 Unit(s) Oral four times a day  pantoprazole    Tablet 40 milliGRAM(s) Oral <User Schedule>  phenytoin   Capsule 200 milliGRAM(s) Oral two times a day  predniSONE   Tablet 10 milliGRAM(s) Oral daily  sirolimus 7 milliGRAM(s) Oral <User Schedule>  trimethoprim   80 mG/sulfamethoxazole 400 mG 1 Tablet(s) Oral daily  valGANciclovir 450 milliGRAM(s) Oral <User Schedule>  warfarin 7 milliGRAM(s) Oral once    MEDICATIONS  (PRN):  acetaminophen     Tablet .. 650 milliGRAM(s) Oral every 6 hours PRN Temp greater or equal to 38C (100.4F), Mild Pain (1 - 3)  dextrose Oral Gel 15 Gram(s) Oral once PRN Blood Glucose LESS THAN 70 milliGRAM(s)/deciliter  polyethylene glycol 3350 17 Gram(s) Oral daily PRN Constipation      Pertinent Labs:  09-12 Na131 mmol/L<L> Glu 233 mg/dL<H> K+ 3.9 mmol/L Cr  5.65 mg/dL<H> BUN 90 mg/dL<H> 09-12 Phos 6.8 mg/dL<H> 09-12 Alb 1.8 g/dL<L>        Enteral/Parenteral Nutrition: Not Applicable    Current Weight (lbs): 149.6 lb on 9/13  Weight Trends (lbs):  149.2 (9/12), 153.6 (9/12), 156.5 (9/10)      Skin: Stage II pressure injury noted at sacrum (9/9)    Edema: None noted    Last Bowel Movement: on 9/13        Estimated Needs:   [X] No Change Since Previous Assessment    Previous Nutrition Diagnosis:   Severe Malnutrition    Nutrition Diagnosis is [X] Ongoing - Addressed w/ Nepro 8oz BID (Provides 850kcal & 38grams of Protein)       Interventions:   1. Recommend continue w/ current nutrition plan of care as tolerated  2. Recommend monitor nutrition related renal labs  3. Provide ongoing diet education as needed    Monitoring & Evaluation:   [X] Weights   [X] PO Intake   [X] Skin Integrity   [X] Follow Up (Per Protocol)  [X] Tolerance to Diet Prescription   [X] Other: Labs & POCT    Registered Dietitian/Nutritionist Remains Available.  Maximus Condon RD, CDN    Phone# [phone]
Nutrition Follow Up Note  Hospital Course   (Per Electronic Medical Record)    Source:  Patient [X] - Pt Lethargic   Medical Record [X]      Diet:   Low Phosphorus Consistent Carbohydrate - Easy to Chew (IDDSI Level 7) Diet w/ Thin Liquids (IDDSI Level 0) & 1,000ml/day Fluid Restriction   Tolerates Diet Consistency Well  No Chewing/Swallowing Difficulties  No Recent Nausea, Vomiting, Diarrhea or Constipation (as Per Patient)  Consumes % of Meals (as Per Documentation) - States Good PO Intake/Appetite (Per Patient)   on Nepro 8oz BID (Provides 850kcal & 38grams of Protein)  Patient Takes Nutrition Supplement Well  Education Provided on Need for Supplementation     Enteral/Parenteral Nutrition: Not Applicable    Current Weight: 129.8lb on 9/26  Obtain Weights Daily  Weight Fluctuates   Obtain New Weight to Confirm     Pertinent Medications: MEDICATIONS  (STANDING):  amLODIPine   Tablet 10 milliGRAM(s) Oral daily  artificial  tears Solution 2 Drop(s) Both EYES every 6 hours  brivaracetam 50 milliGRAM(s) Oral <User Schedule>  brivaracetam 50 milliGRAM(s) Oral <User Schedule>  carvedilol 12.5 milliGRAM(s) Oral every 12 hours  chlorhexidine 2% Cloths 1 Application(s) Topical <User Schedule>  dextrose 5%. 1000 milliLiter(s) (100 mL/Hr) IV Continuous <Continuous>  dextrose 5%. 1000 milliLiter(s) (50 mL/Hr) IV Continuous <Continuous>  dextrose 50% Injectable 25 Gram(s) IV Push once  dextrose 50% Injectable 12.5 Gram(s) IV Push once  dextrose 50% Injectable 25 Gram(s) IV Push once  doxazosin 4 milliGRAM(s) Oral <User Schedule>  epoetin cortney-epbx (RETACRIT) Injectable 82522 Unit(s) IV Push <User Schedule>  finasteride 5 milliGRAM(s) Oral daily  furosemide    Tablet 80 milliGRAM(s) Oral daily  glucagon  Injectable 1 milliGRAM(s) IntraMuscular once  insulin glargine Injectable (LANTUS) 5 Unit(s) SubCutaneous at bedtime  insulin lispro (ADMELOG) corrective regimen sliding scale   SubCutaneous three times a day before meals  insulin lispro (ADMELOG) corrective regimen sliding scale   SubCutaneous at bedtime  insulin lispro Injectable (ADMELOG) 5 Unit(s) SubCutaneous three times a day before meals  levothyroxine 50 MICROGram(s) Oral daily  multivitamin 1 Tablet(s) Oral daily  mycophenolate mofetil 500 milliGRAM(s) Oral <User Schedule>  nystatin    Suspension 310371 Unit(s) Oral four times a day  pantoprazole    Tablet 40 milliGRAM(s) Oral <User Schedule>  phenytoin   Capsule 200 milliGRAM(s) Oral two times a day  predniSONE   Tablet 10 milliGRAM(s) Oral daily  ramelteon 8 milliGRAM(s) Oral at bedtime  sevelamer carbonate 800 milliGRAM(s) Oral three times a day with meals  sirolimus 7 milliGRAM(s) Oral <User Schedule>  trimethoprim   80 mG/sulfamethoxazole 400 mG 1 Tablet(s) Oral daily  valGANciclovir 450 milliGRAM(s) Oral <User Schedule>  warfarin 5 milliGRAM(s) Oral once    MEDICATIONS  (PRN):  acetaminophen     Tablet .. 650 milliGRAM(s) Oral every 6 hours PRN Temp greater or equal to 38C (100.4F), Mild Pain (1 - 3)  dextrose Oral Gel 15 Gram(s) Oral once PRN Blood Glucose LESS THAN 70 milliGRAM(s)/deciliter  polyethylene glycol 3350 17 Gram(s) Oral daily PRN Constipation    Pertinent Labs:  09-26 Na130 mmol/L<L> Glu 132 mg/dL<H> K+ 4.2 mmol/L Cr  7.10 mg/dL<H>  mg/dL<H> 09-22 Phos 3.7 mg/dL 09-26 Alb 2.0 g/dL<L>    POCT (over Last 3 Days) - Ranging from 103-259    Skin: Stage 2 Pressure Ulcer on Sacrum    Edema: None Noted (as Per Documentation)     Last Bowel Movement: on 9/25    Estimated Needs:   [X] No Change Since Previous Assessment    Previous Nutrition Diagnosis:   Severe Malnutrition    Nutrition Diagnosis is [X] Ongoing - Patient Continues on Nutrition Supplement, Patient Takes Nutrition Supplement & Nutrition Education Provided on Need for Supplementation     New Nutrition Diagnosis: [X] Not Applicable    Interventions:   1. Education Provided on Need for Supplementation   2. Recommend Continue Nutrition Plan of Care     Monitoring & Evaluation:   [X] Weights   [X] PO Intake   [X] Skin Integrity   [X] Follow Up (Per Protocol)  [X] Tolerance to Diet Prescription   [X] Other: Labs & POCT    Registered Dietitian/Nutritionist Remains Available.  Tashi Alcantar RDN    Pager #[phone]  Phone# [phone]
[facility] Interdiscplinary Plan of Care    REHABILITATION DIAGNOSIS:  Debility due to sepsis from chest infection and pleural effusion       COMORBIDITIES/COMPLICATING CONDITIONS IMPACTING REHABILITATION:  HEALTH ISSUES - PROBLEM Dx:        PAST MEDICAL & SURGICAL HISTORY:  Hypertension      Diabetes      Dyslipidemia      CAD (Coronary Artery Disease)  with Stents in 06/2009 and 6/2019, s/p off-pump C3L on 8/13/20      Hypothyroidism      CVA (cerebral vascular accident)  12/13/19 with residual bilateral weakness      Anemia      PEG (percutaneous endoscopic gastrostomy) status  removed July 2020      Intubation of airway performed without difficulty  Dec 2019  CVA c/b status epilepticus requiring intubation and PEG in 12/2019-      ESRD on dialysis  M/W/F      Seizures  after CVA, last seizure was last week 4/07/22      History of insertion of stent into coronary artery bypass graft  2009 and 2019      S/P CABG x 3  off pump C3L on 8/13/20          Based upon consideration of the patient's impairments, functional status, complicating conditions and any other contributing factors and after information garnered from the assessments of all therapy disciplines involved in treating the patient and other pertinent clinicians:    INTERDISCIPLINARY REHABILITATION INTERVENTIONS:    [ X  ] Transfer Training  [ X  ] Bed Mobility  [ X  ] Therapeutic Exercise  [ X ] Balance/Coordination Exercises  [ X ] Locomotion retraining  [ X  ] Stairs  [  X ] Functional Transfer Training  [   ] Bowel/Bladder program  [   ] Pain Management  [   ] Skin/Wound Care  [   ] Visual/Perceptual Training  [   ] Therapeutic Recreation Activities  [   x] Neuromuscular Re-education  [ X  ] Activities of Daily Living  [   ] Speech Exercise  [   ] Swallowing Exercises  [   ] Vital Stim  [   ] Dietary Supplements  [   ] Calorie Count  [   ] Cognitive Exercises  [   ] Congnitive/Linguistic Treatment  [   ] Behavior Program  [   ] Neuropsych Therapy  [ X  ] Patient/Family Counseling  [ X ] Family Training  [ X  ] Community Re-entry  [   ] Orthotic Evaluation  [   ] Prosthetic Eval/Training    MEDICAL PROGNOSIS:  ***    REHAB POTENTIAL:  ***  EXPECTED DAILY THERAPY:         PT: 1 .5 hr        OT: 1.5 hr        ST: n/a        P&O: n/a     EXPECTED INTENSITY OF PROGRAM:  3 hrs / Day    EXPECTED FREQUENCY OF PROGRAM: 5 Days/ Week    ESTIMATED LOS:  [  ] 5-7 Days  [  ] 7-10 Days  [ x ] 10- 14 Days  [  ] 14- 18 Days  [  ] 18- 21 Days    ESTIMATED DISPOSITION:  [  ] Home   [  ] Home with Outpatient Therapies  [x  ] Home with Home Therapies  [  ] Assisted Living  [  ] Nursing Home  [  ] Long Term Acute Care    INTERDISCIPLINARY FUNCTIONAL OUTCOMES/GOALS:         Gait/Mobility: min a with gait device        Transfers: min a       ADLs: min a       Functional Transfers: min a       Medication Management: min a       Communication: min a       Cognitive: min a       Dysphagia: min a       Bladder will be evaluated       Bowel: will be evaluated     Functional Independent Measures: 6  7 = Independent  6 = Modified Independent  5 = Supervision  4 = Minimal Assist/ Contact Guard  3 = Moderate Assistance  2 = Maximum Assistance  1 = Total Assistance  0 = Unable to assess

## 2022-10-04 NOTE — PROGRESS NOTE ADULT - ASSESSMENT
Seen and assessed today.  Had 1u pRBC on 10/1 with HCT 24 -> 28. Since then no further dec in HCT.   EGD/[place] on 10/3 wnl, just mild gastritis and small internal hemorrhoids.  On Retacrit w HD sessions.  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  Txp on 4/21, donor CMV + to recip CMV +, can d/c Valcyte in setting of anemia.

## 2022-10-04 NOTE — PROGRESS NOTE ADULT - ASSESSMENT
66 yr old Male with PMHx of DM type II, HTN, CAD s/p stents/ CABG (2020), AFib on Eliquis, CVA (2019) d/t Afib, Seizure d/o following CVA, last episode was on 4/8/22, h/o GIB (2/2022) EGD with duodenal ulcer.  ESRD due to DM was on HD since 2019, s/p premacath removal 5/10/22 s/p Right DDRT (4/21/22) and placed on belatacept.  Frequent hospitalization d/t weakness, refractory sz, and sepsis.  Admitted and intubated 6/10, found to large pleural effusion s/p thoracentesis ~1.3 and VATS of thoracic 2.2 L.      * Neurology consult for alternative med to Briviate (not approved by insurance post discharge),  * FOBT +VE, Due C scope 10/3,  * Stop coumadin, start heparin bridge * NPO midnight Sunday (10/2), C scope Monday 10/3    TRANSPLANT PATIENT -  *Coumadin 5mg ordered for (10/4).  Pending INR to be taken with HD today   * Anemia of chronic disease -8.6 (10/4)  *FOBT positive (9/30) - Colonoscopy (10/3) - gastritis + small hemorrhoids, biopsy taken  * transplant team PHONE  --Iker Alfred (MD), Dr. Eduardo Barber (transplant surgeon) - Darleen Dior NP [phone]*  * Barby NP - recommendations for labs: BK virus DNA (negative 9/29), lactate ( 340 9/22 -> 257 9/29 ), mag (1.9 - 1.8 9/29 ), parathyroid (178 -> 183 9/29), phosphorus ( 3.7 -> 3.8 9/29), CRP (190 -> 200 9/29), uric acid (3.2 -> 3.6 9/29), and UA (oliguria).  CMV pcr (negative 9/29)  *Barby NP emailed in regards to lab monitoring and to review discharge medications ---- Transplant NP esubmit all medications to cfam vivo -> brivaracetam will be $350, Neuro consult appreciated.  Patient and family would like to continue on current regimen.  Prefer to have outpatient f/u with primary neuro to discuss med management.  * HD T/TH/Sat - need additional brivaracetam after HD    COMORBIDITES/ACTIVE MEDICAL ISSUES   Gait Instability, ADL impairments and Functional impairments secondary to recurrent UTI, status epilepticus, with debility seizures. Start Comprehensive Rehab Program of PT/OT/SLP * therapy changes: d/c SLP, 90 PT, 90 OT    # Sepsis  - recurrent UTI treated with abx  - pleural effusion s/p thoracentesis 1.3L   - hemothorax s/p VATS 2.2 L  - BCx (8/14) negative  - ID following    #RLQ cellulitis   - complete course of keflex 8/20 - 9/4    #Seizure  - on Phenytoin  - Noted on EEG, +Brivaracetam (8/16)  - Additional Brivaracetam 50mg post each HD session***  - seizure precaution  - neuro consult appreciated in regards to medication management - family would like to discuss with primary neuro (Dr. 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  Belatacept d/t 8/1 and was started on Sirolimus 7mg QD  - Prednisone 10  - Cellcept 250 BID on hold  - S/p perma cath placement 8/30/2022  - PPx medications: Nystatin, Bactrim, Valcyte (MWF)  - Nephrology following - modified to MWF ->  blood transfusion with HD on 10/1 -> now switched to T/TH/Sat  - Strongyloides Antibodies positive - Ivermectin x1 dose (9/21)    #Anemia of chronic disease  - Has gotten total of 6U PRBC and 2 U FFP during acute hospital   - monitor H/H 8.0 (9/12) - 7.4 (9/30) -> 8.6 (10/3 (post transfusion)  - FOBT positive --- GI consult appreciated, Colonoscopy for 10/3 - gastritis + small hemorrhoids, biopsy taken  - iron panel low -> 1 U PRBC with HD (10/1)    #HTN  #Afib  - Eliquis switched to Coumadin  - Coreg   - Daily INR til therapeutic on a consistent coumadin dose     #Diabetes  - A1C  - Lantus  - standing Admelog dc, restarted 9/16   - FS + ISS  - Hospitalist following   - FS  163 - 257 (10/4)    #Hypothyroidism  - Synthroid 37.5 mcg    #Pain control  - Tylenol PRN    #GI/Bowel Mgmt   - At risk for constipation due to neurologic diagnosis, immobility and/or medication use  - Senna d/c d/t loose BM 9/11  -  Miralax PRN    #Bladder management  - incontinence     #DVT   - acute DVT to R IJ thrombosis, Superficial vein thrombus to right basilic and cephalic vein  - Coumadin   - SCDs, TEDs     #Skin:  -Stage 2 pressure ulcer to sacrum 0.3 x 0.2- cleanse with NS, apply wet gauze andcover with allevyn foam dressing  -At risk for pressure injury due to neurologic diagnosis and relative immobility  -Bilateral heels - soft heel protectors, apply liquid barrier film daily and monitor for tissue type changes  - Turn Q2 hr while in bed, air mattress  - Skin barrier cream as needed  - Nursing to monitor skin Qshift    Diet  - Regular/easy to chew + Thins  [CCHO, renal diet]    + Fluid Restriction: 1L    Precautions / PROPHYLAXIS:   - Falls, Cardiac, Seizure   - ortho: Weight bearing status: WBAT   - Lungs: Aspiration, Incentive Spirometer   - Pressure injury/Skin: Turn Q2hrs while in bed, OOB to Chair, PT/OT      liaison with family 9/12--Spoke with son Dr Perez  He gave update on patient's clinical state, investigation results and function prior to acute rehab treatment   I gave update on patients current status, and he was happy with same  Plan to to give interval updates to family as needed    9/22/22--I discussed with his second son (an RN) at patient's bed side  He reports family's plan for home dc, arrangements made to care for patient--his wife is always at home, and sons make time available    10/3--I called son, on phone and discussed result of Endoscropy today, plan to achieve therapeutic INR and ensure approval of sirolimus with Transplant team prior to dc  He was happy with the explanation    IDT 10/3  lives with spouse and 2 sons in  with 6 JARRETT, 1st [place] setup.  PTA needed some assistance with ADLs  Functional status: ADLs min-mod A with max cutes, sequencing, hand placement, transferring min A RW, amb 50' RW min-mod A   Goal: min A/needs hands on A, mod A shower transfer  TDD: 10/5 home ---- awaiting for seizure medication approval     Follow ups:  Andre Patterson (MD)  Clinical Neurophysiology; EEGEpilepsy; Neurology  [address] [facility], [address]  [place] [address]  Phone: [phone]  Fax: [phone]  Follow Up Time:     Padmini Lincoln ()  Internal Medicine  [address], [address]  [place] [address]  Phone: [phone]  Fax: [phone]  Follow Up Time:     Iker Alfred)  Internal Medicine; Nephrology  [address] [place] [address]  Phone: [phone]  Fax: [phone]  Follow Up Time:    Dr. Eduardo Barber - Organ Transplant    Iker Alfred  [facility] Physician [facility]  NE[address] [facility] [address]  Scheduled Appointment: 09/20/2022    Iker Alfred  [facility] Physician [facility]  [address] [facility] [address]  Scheduled Appointment: 10/20/2022     66 yr old Male with PMHx of DM type II, HTN, CAD s/p stents/ CABG (2020), AFib on Eliquis, CVA (2019) d/t Afib, Seizure d/o following CVA, last episode was on 4/8/22, h/o GIB (2/2022) EGD with duodenal ulcer.  ESRD due to DM was on HD since 2019, s/p premacath removal 5/10/22 s/p Right DDRT (4/21/22) and placed on belatacept.  Frequent hospitalization d/t weakness, refractory sz, and sepsis.  Admitted and intubated 6/10, found to large pleural effusion s/p thoracentesis ~1.3 and VATS of thoracic 2.2 L.      * INR monitoring * DC planning * DC Vacyte as recs by transplant team     TRANSPLANT PATIENT -  *Coumadin 5mg ordered for (10/4).  Pending INR to be taken with HD today   * Anemia of chronic disease -8.6 (10/4)  *FOBT positive (9/30) - Colonoscopy (10/3) - gastritis + small hemorrhoids, biopsy taken  * transplant team PHONE  --Iker Alfred (MD), Dr. Eduardo Barber (transplant surgeon) - Darleen Dior NP [phone]*  * Barby NP - recommendations for labs: BK virus DNA (negative 9/29), lactate ( 340 9/22 -> 257 9/29 ), mag (1.9 - 1.8 9/29 ), parathyroid (178 -> 183 9/29), phosphorus ( 3.7 -> 3.8 9/29), CRP (190 -> 200 9/29), uric acid (3.2 -> 3.6 9/29), and UA (oliguria).  CMV pcr (negative 9/29)  *Barby NP emailed in regards to lab monitoring and to review discharge medications ---- Transplant NP esubmit all medications to cfam vivo -> brivaracetam will be $350, Neuro consult appreciated.  Patient and family would like to continue on current regimen.  Prefer to have outpatient f/u with primary neuro to discuss med management.  * HD T/TH/Sat - need additional brivaracetam after HD    COMORBIDITES/ACTIVE MEDICAL ISSUES   Gait Instability, ADL impairments and Functional impairments secondary to recurrent UTI, status epilepticus, with debility seizures. Start Comprehensive Rehab Program of PT/OT/SLP * therapy changes: d/c SLP, 90 PT, 90 OT    # Sepsis  - recurrent UTI treated with abx  - pleural effusion s/p thoracentesis 1.3L   - hemothorax s/p VATS 2.2 L  - BCx (8/14) negative  - ID following      #Seizure  - on Phenytoin  - Noted on EEG, +Brivaracetam (8/16)  - Additional Brivaracetam 50mg post each HD session***  - seizure precaution  - neuro consult appreciated in regards to medication management - family would like to discuss with primary neuro (Dr. 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  Belatacept d/t 8/1 and was started on Sirolimus 7mg QD  - Prednisone 10  - Cellcept 250 BID on hold  - S/p perma cath placement 8/30/2022  - PPx medications: Nystatin, Bactrim, Valcyte (MWF)  - Nephrology following - modified to MWF ->  blood transfusion with HD on 10/1 -> now switched to T/TH/Sat  - Strongyloides Antibodies positive - Ivermectin x1 dose (9/21)  --Transplant team review 10/4-- Dr Frankie Long , transplant physician--recs to d/c valcyte in the setting of anemia    #Anemia of chronic disease  - Has gotten total of 6U PRBC and 2 U FFP during acute hospital   - monitor H/H 8.0 (9/12) - 7.4 (9/30) -> 8.6 (10/3 (post transfusion)  - FOBT positive --- GI consult appreciated, Colonoscopy for 10/3 - gastritis + small hemorrhoids, biopsy taken  - iron panel low -> 1 U PRBC with HD (10/1)    #HTN  #Afib  - Eliquis switched to Coumadin  - Coreg   - Daily INR til therapeutic on a consistent coumadin dose     #Diabetes  - A1C  - Lantus  - standing Admelog dc, restarted 9/16   - FS + ISS  - Hospitalist following   - FS  163 - 257 (10/4)    #Hypothyroidism  - Synthroid 37.5 mcg    #Pain control  - Tylenol PRN    #GI/Bowel Mgmt   - At risk for constipation due to neurologic diagnosis, immobility and/or medication use  - Senna d/c d/t loose BM 9/11  -  Miralax PRN    #Bladder management  - incontinence     #DVT   - acute DVT to R IJ thrombosis, Superficial vein thrombus to right basilic and cephalic vein  - Coumadin   - SCDs, TEDs     #Skin:  -Stage 2 pressure ulcer to sacrum 0.3 x 0.2- cleanse with NS, apply wet gauze andcover with allevyn foam dressing  -At risk for pressure injury due to neurologic diagnosis and relative immobility  -Bilateral heels - soft heel protectors, apply liquid barrier film daily and monitor for tissue type changes  - Turn Q2 hr while in bed, air mattress  - Skin barrier cream as needed  - Nursing to monitor skin Qshift    Diet  - Regular/easy to chew + Thins  [CCHO, renal diet]    + Fluid Restriction: 1L    Precautions / PROPHYLAXIS:   - Falls, Cardiac, Seizure   - ortho: Weight bearing status: WBAT   - Lungs: Aspiration, Incentive Spirometer   - Pressure injury/Skin: Turn Q2hrs while in bed, OOB to Chair, PT/OT      liaison with family 9/12--Spoke with son  Chris  He gave update on patient's clinical state, investigation results and function prior to acute rehab treatment   I gave update on patients current status, and he was happy with same  Plan to to give interval updates to family as needed    9/22/22--I discussed with his second son (an RN) at patient's bed side  He reports family's plan for home dc, arrangements made to care for patient--his wife is always at home, and sons make time available    10/3--I called son, on phone and discussed result of Endoscropy today, plan to achieve therapeutic INR and ensure approval of sirolimus with Transplant team prior to dc  He was happy with the explanation    IDT 10/3  lives with spouse and 2 sons in  with 6 J[place]TT, 1st [place] setup.  PTA needed some assistance with ADLs  Functional status: ADLs min-mod A with max cutes, sequencing, hand placement, transferring min A RW, amb 50' RW min-mod A   Goal: min A/needs hands on A, mod A shower transfer  TDD: 10/5 home ---- awaiting for seizure medication approval     Follow ups:  Andre Patterson)  Clinical Neurophysiology; EEGEpilepsy; Neurology  [address] [facility] [facility], [address]  [place] [address]  Phone: [phone]  Fax: [phone]  Follow Up Time:     Padmini Lincoln ()  Internal Medicine  [address] [facility] [address]  [place] [address]  Phone: [phone]  Fax: [phone]  Follow Up Time:     Iker Alfred)  Internal Medicine; Nephrology  [address] [place] [address]  Phone: [phone]  Fax: [phone]  Follow Up Time:    Dr. Eduardo Barber - Organ Transplant    Iker Alfred  [facility] Physician [facility]  NEPH[address] [place] [address] D  Scheduled Appointment: 09/20/2022    Iker Alfred  [facility]  NEPH[address] [place] [address] D  Scheduled Appointment: 10/20/2022    [facility]  Transplant team

## 2022-10-04 NOTE — PROGRESS NOTE ADULT - SUBJECTIVE AND OBJECTIVE BOX
Patient is a 66y old  Male who presents with a chief complaint of Debility (04 Oct 2022 08:20)    Patient seen and examined at bedside. No events overnight. Denies any acute complaints, no chest pain, sob, abd pain. Had EGD and colonoscopy yesterday, showed hemorrhoids.    ALLERGIES:  No Known Allergies    MEDICATIONS  (STANDING):  amLODIPine   Tablet 10 milliGRAM(s) Oral daily  artificial  tears Solution 2 Drop(s) Both EYES every 6 hours  brivaracetam 50 milliGRAM(s) Oral <User Schedule>  brivaracetam 50 milliGRAM(s) Oral <User Schedule>  carvedilol 12.5 milliGRAM(s) Oral every 12 hours  chlorhexidine 2% Cloths 1 Application(s) Topical <User Schedule>  dextrose 5%. 1000 milliLiter(s) (50 mL/Hr) IV Continuous <Continuous>  dextrose 5%. 1000 milliLiter(s) (100 mL/Hr) IV Continuous <Continuous>  dextrose 50% Injectable 25 Gram(s) IV Push once  dextrose 50% Injectable 25 Gram(s) IV Push once  dextrose 50% Injectable 12.5 Gram(s) IV Push once  doxazosin 4 milliGRAM(s) Oral <User Schedule>  epoetin cortney-epbx (RETACRIT) Injectable 89938 Unit(s) IV Push <User Schedule>  finasteride 5 milliGRAM(s) Oral daily  furosemide    Tablet 80 milliGRAM(s) Oral daily  glucagon  Injectable 1 milliGRAM(s) IntraMuscular once  insulin glargine Injectable (LANTUS) 5 Unit(s) SubCutaneous at bedtime  insulin lispro (ADMELOG) corrective regimen sliding scale   SubCutaneous three times a day before meals  insulin lispro (ADMELOG) corrective regimen sliding scale   SubCutaneous at bedtime  insulin lispro Injectable (ADMELOG) 5 Unit(s) SubCutaneous three times a day before meals  levothyroxine 50 MICROGram(s) Oral daily  multivitamin 1 Tablet(s) Oral daily  mycophenolate mofetil 500 milliGRAM(s) Oral <User Schedule>  nystatin    Suspension 869924 Unit(s) Oral four times a day  pantoprazole    Tablet 40 milliGRAM(s) Oral <User Schedule>  phenytoin   Capsule 200 milliGRAM(s) Oral two times a day  predniSONE   Tablet 10 milliGRAM(s) Oral daily  ramelteon 8 milliGRAM(s) Oral at bedtime  sevelamer carbonate 800 milliGRAM(s) Oral three times a day with meals  sirolimus 7 milliGRAM(s) Oral <User Schedule>  trimethoprim   80 mG/sulfamethoxazole 400 mG 1 Tablet(s) Oral daily  valGANciclovir 450 milliGRAM(s) Oral <User Schedule>    MEDICATIONS  (PRN):  acetaminophen     Tablet .. 650 milliGRAM(s) Oral every 6 hours PRN Temp greater or equal to 38C (100.4F), Mild Pain (1 - 3)  dextrose Oral Gel 15 Gram(s) Oral once PRN Blood Glucose LESS THAN 70 milliGRAM(s)/deciliter  polyethylene glycol 3350 17 Gram(s) Oral daily PRN Constipation    Vital Signs Last 24 Hrs  T(F): 97.4 (03 Oct 2022 21:39), Max: 98.2 (03 Oct 2022 12:15)  HR: 64 (04 Oct 2022 05:05) (60 - 64)  BP: 167/69 (04 Oct 2022 05:05) (153/62 - 167/69)  RR: 16 (03 Oct 2022 21:39) (16 - 16)  SpO2: 97% (03 Oct 2022 21:39) (96% - 97%)  I&O's Summary    04 Oct 2022 07:01  -  04 Oct 2022 08:54  --------------------------------------------------------  IN: 240 mL / OUT: 0 mL / NET: 240 mL      PHYSICAL EXAM:  General: NAD, laying in bed  ENT: MMM, no scleral icterus  Neck: Supple, No JVD, no thyroidomegaly  Lungs: Clear to auscultation bilaterally, no wheezes, no rales, no rhonchi, good inspiratory effort  Cardio: RRR, S1/S2, No murmurs  Abdomen: Soft, Nontender, Nondistended; Bowel sounds present  Extremities: No calf tenderness, No pitting edema, no skin changes    LABS:                        8.6    4.41  )-----------( 326      ( 04 Oct 2022 06:00 )             28.0     10-03    132  |  95  |  59  ----------------------------<  184  5.2   |  24  |  5.11    Ca    9.0      03 Oct 2022 11:58        PT/INR - ( 03 Oct 2022 06:30 )   PT: 18.6 sec;   INR: 1.60 ratio         PTT - ( 03 Oct 2022 06:30 )  PTT:37.3 sec        07-27 Chol -- LDL -- HDL -- Trig 118 mg/dL              POCT Blood Glucose.: 164 mg/dL (04 Oct 2022 07:55)  POCT Blood Glucose.: 163 mg/dL (03 Oct 2022 21:53)  POCT Blood Glucose.: 257 mg/dL (03 Oct 2022 17:33)  POCT Blood Glucose.: 192 mg/dL (03 Oct 2022 13:31)  POCT Blood Glucose.: 195 mg/dL (03 Oct 2022 12:09)          COVID-19 PCR: NotDetec (10-03-22 @ 05:50)  COVID-19 PCR: NotDetec (09-27-22 @ 08:00)  COVID-19 PCR: NotDetec (09-21-22 @ 05:45)  COVID-19 PCR: NotDetec (09-15-22 @ 05:41)  COVID-19 PCR: NotDetec (09-09-22 @ 07:43)      RADIOLOGY & ADDITIONAL TESTS: reviewed labs    Care Discussed with Consultants/Other Providers: discussed with Dr. Ypi

## 2022-10-05 NOTE — PROGRESS NOTE ADULT - PROVIDER SPECIALTY LIST ADULT
Gastroenterology
Hospitalist
Infectious Disease
Nephrology
Rehab Medicine
Transplant Surgery
Hospitalist
Nephrology
Physiatry
Rehab Medicine
Gastroenterology
Hospitalist
Infectious Disease
Nephrology
Physiatry
Rehab Medicine
Hospitalist
Rehab Medicine
Hospitalist
Rehab Medicine

## 2022-10-05 NOTE — PROGRESS NOTE ADULT - ASSESSMENT
66M with DM2, HTN, CAD with stents/CABG, AF on Coumadin, hx CVA, seizure, PUD, s/p failed renal transplant now back on HD, recent hospitalization for seizures requiring intubation and course complicated by UTI, as well as need for VATS due to large hemothorax, now in rehab     #Seizure disorder  - c/w phenytoin and Brivaracetam   - neuro consulted, Dr. Deal    #ESRD on HD / failed renal transplant  - c/w HD as per renal  - c/w immunosuppression  - c/w infectious disease prophylaxis    #Anemia of chronic disease  - s/p 1U PRBC on 10/1 with HD  - keep active type and screen  - c/w EPO  - FOBT positive, GI recs appreciated  - EGD/ colonoscopy 10/3 showed gastritis and internal hemorrhoids  - coumadin restarted    #Chronic AF   - on Coumadin: goal INR 2-3  - c/w Coreg    #DM2 with steroid-induced hyperglycemia  - A1C with Estimated Average Glucose Result: 6.4 %  - Admelog 5 units premeal  - C/w lantus 5units qhs  - C/w hypoglycemia protocol    #Hypothyroid  - c/w synthroid    #Right IJ DVT  - coumadin    #Urinary retention  - c/w Proscar and Doxazosin     #Abdominal wall cellulitis  - Completed course of antibiotics    #DVT ppx:  - Coumadin

## 2022-10-05 NOTE — PROGRESS NOTE ADULT - SUBJECTIVE AND OBJECTIVE BOX
SUBJECTIVE/ROS:  Patient seen and evaluated sitting up in WC chart reviewed  No interval med complaint  HD yesterday uneventful, renal team rec next HD Fri and will continue MWF schedule  Oral intake continued to improve    Interval CXR showed no acute disease  Decreasing bilateral perihilar mild diffuse airspace   INR 1.98- Therapeutic    ROS  Denies CP, palpitation, SOB, or cough  No HA, dizziness, or lightheadedness  no nausea or abd pain.  LBM 10/4  minimal to none urine output    Vital Signs Last 24 Hrs  T(C): 36.7 (05 Oct 2022 07:37), Max: 36.7 (04 Oct 2022 15:00)  T(F): 98 (05 Oct 2022 07:37), Max: 98 (04 Oct 2022 15:00)  HR: 69 (05 Oct 2022 07:37) (60 - 83)  BP: 146/66 (05 Oct 2022 07:37) (138/67 - 169/65)  RR: 14 (05 Oct 2022 07:37) (14 - 20)  SpO2: 94% (05 Oct 2022 07:37) (94% - 100%)  O2 Parameters below as of 05 Oct 2022 07:37  Patient On (Oxygen Delivery Method): room air      Gen - NAD, comfortable on RA  HEENT - NCAT, EOMI, MMM  Neck - Supple, No limited ROM  Pulm - clear   Chest - left chest wall permcath  Abdomen -  distended, but nontender, tympanic  Extremities - No Cyanosis, no clubbing, no edema, no calf tenderness  Neuro-     Cognitive - awake, alert, oriented to person and place. Delayed processing/response     Cranial Nerves - CN 2-12 intact. No facial asymmetry, Tongue midline, EOMI, Shoulder shrug intact     Motor -                     LEFT    UE - 3+5                    RIGHT UE - 3-/5                    LEFT    LE - KE 4/5                    RIGHT LE - KE 4/5        Sensory - Intact to LT bilaterally  MSK: generalized weakness  Skin:  stage 2 pressure ulcer to sacrum 0.3 x 0.2,  discoloration to lateral RLQ of abdomen                           8.6    4.41  )-----------( 326      ( 04 Oct 2022 06:00 )             28.0     10-03    132<L>  |  95<L>  |  59<H>  ----------------------------<  184<H>  5.2   |  24  |  5.11<H>    Ca    9.0      03 Oct 2022 11:58    PT/INR - ( 03 Oct 2022 06:30 )   PT: 18.6 sec;   INR: 1.60 ratio    PTT - ( 03 Oct 2022 06:30 )  PTT:37.3 sec    FOBT - positive (9/30)    Colonoscopy 10/3  Upper Endoscopy    Esophagus normal to GE junction  GE junction at 42 cm and appeared normal, random biopsy taken.  Gastric antrum mild gastritis, random biopsy taken  Duodenum normal to the second portion, random biopsy taken in the second portion    After he was turned and the Colonoscope was inserted per rectum and advanced to the cecum and into the terminal ieum.    The terminal ileum was normal\parThe cecum was normal  The ascending colon was normal on forward and retroflexed views.  The Transverse colon was normal  The Descending colon was normal  The sigmoid colon was normal  The rectum was normal  The retroflexed view of the rectum showed small internal hemorrhoids.      Xray Chest 1 View-PORTABLE IMMEDIATE (Xray Chest 1 View-PORTABLE IMMEDIATE .) (09.29.22 @ 12:26) >  Decreasing bilateral perihilar mild diffuse airspace   disease.. No pneumothorax. No large effusion.    Xray Chest 1 View- PORTABLE-Routine (Xray Chest 1 View- PORTABLE-Routine .) (09.09.22 @ 12:04) >  Interval exchange of the dialysis catheter. No evidence of pneumothorax.   No other significant change.    Allergies  No Known Allergies    MEDICATIONS  (STANDING):  amLODIPine   Tablet 10 milliGRAM(s) Oral daily  artificial  tears Solution 2 Drop(s) Both EYES every 6 hours  brivaracetam 50 milliGRAM(s) Oral <User Schedule>  brivaracetam 50 milliGRAM(s) Oral <User Schedule>  carvedilol 12.5 milliGRAM(s) Oral every 12 hours  chlorhexidine 2% Cloths 1 Application(s) Topical <User Schedule>  dextrose 5%. 1000 milliLiter(s) (100 mL/Hr) IV Continuous <Continuous>  dextrose 5%. 1000 milliLiter(s) (50 mL/Hr) IV Continuous <Continuous>  dextrose 50% Injectable 25 Gram(s) IV Push once  dextrose 50% Injectable 12.5 Gram(s) IV Push once  dextrose 50% Injectable 25 Gram(s) IV Push once  doxazosin 4 milliGRAM(s) Oral <User Schedule>  epoetin cortney-epbx (RETACRIT) Injectable 19810 Unit(s) IV Push <User Schedule>  finasteride 5 milliGRAM(s) Oral daily  furosemide    Tablet 80 milliGRAM(s) Oral daily  glucagon  Injectable 1 milliGRAM(s) IntraMuscular once  insulin glargine Injectable (LANTUS) 5 Unit(s) SubCutaneous at bedtime  insulin lispro (ADMELOG) corrective regimen sliding scale   SubCutaneous three times a day before meals  insulin lispro (ADMELOG) corrective regimen sliding scale   SubCutaneous at bedtime  insulin lispro Injectable (ADMELOG) 5 Unit(s) SubCutaneous three times a day before meals  levothyroxine 50 MICROGram(s) Oral daily  multivitamin 1 Tablet(s) Oral daily  mycophenolate mofetil 500 milliGRAM(s) Oral <User Schedule>  nystatin    Suspension 341764 Unit(s) Oral four times a day  pantoprazole    Tablet 40 milliGRAM(s) Oral <User Schedule>  phenytoin   Capsule 200 milliGRAM(s) Oral two times a day  predniSONE   Tablet 10 milliGRAM(s) Oral daily  ramelteon 8 milliGRAM(s) Oral at bedtime  sevelamer carbonate 800 milliGRAM(s) Oral three times a day with meals  sirolimus 7 milliGRAM(s) Oral <User Schedule>  trimethoprim   80 mG/sulfamethoxazole 400 mG 1 Tablet(s) Oral daily  valGANciclovir 450 milliGRAM(s) Oral <User Schedule>  warfarin 5 milliGRAM(s) Oral once    MEDICATIONS  (PRN):  acetaminophen     Tablet .. 650 milliGRAM(s) Oral every 6 hours PRN Temp greater or equal to 38C (100.4F), Mild Pain (1 - 3)  dextrose Oral Gel 15 Gram(s) Oral once PRN Blood Glucose LESS THAN 70 milliGRAM(s)/deciliter  polyethylene glycol 3350 17 Gram(s) Oral daily PRN Constipation

## 2022-10-05 NOTE — PROGRESS NOTE ADULT - SUBJECTIVE AND OBJECTIVE BOX
Patient is a 66y old  Male who presents with a chief complaint of Debility (04 Oct 2022 17:51)    Patient seen and examined at bedside. No events overnight. Denies any acute complaints, no chest pain, sob, abd pain.    ALLERGIES:  No Known Allergies    MEDICATIONS  (STANDING):  amLODIPine   Tablet 10 milliGRAM(s) Oral daily  artificial  tears Solution 2 Drop(s) Both EYES every 6 hours  brivaracetam 50 milliGRAM(s) Oral <User Schedule>  brivaracetam 50 milliGRAM(s) Oral <User Schedule>  carvedilol 12.5 milliGRAM(s) Oral every 12 hours  chlorhexidine 2% Cloths 1 Application(s) Topical <User Schedule>  dextrose 5%. 1000 milliLiter(s) (100 mL/Hr) IV Continuous <Continuous>  dextrose 5%. 1000 milliLiter(s) (50 mL/Hr) IV Continuous <Continuous>  dextrose 50% Injectable 25 Gram(s) IV Push once  dextrose 50% Injectable 12.5 Gram(s) IV Push once  dextrose 50% Injectable 25 Gram(s) IV Push once  doxazosin 4 milliGRAM(s) Oral <User Schedule>  epoetin cortney-epbx (RETACRIT) Injectable 07622 Unit(s) IV Push <User Schedule>  finasteride 5 milliGRAM(s) Oral daily  furosemide    Tablet 80 milliGRAM(s) Oral daily  glucagon  Injectable 1 milliGRAM(s) IntraMuscular once  insulin glargine Injectable (LANTUS) 5 Unit(s) SubCutaneous at bedtime  insulin lispro (ADMELOG) corrective regimen sliding scale   SubCutaneous three times a day before meals  insulin lispro (ADMELOG) corrective regimen sliding scale   SubCutaneous at bedtime  insulin lispro Injectable (ADMELOG) 5 Unit(s) SubCutaneous three times a day before meals  levothyroxine 50 MICROGram(s) Oral daily  multivitamin 1 Tablet(s) Oral daily  mycophenolate mofetil 500 milliGRAM(s) Oral <User Schedule>  nystatin    Suspension 341732 Unit(s) Oral four times a day  pantoprazole    Tablet 40 milliGRAM(s) Oral <User Schedule>  phenytoin   Capsule 200 milliGRAM(s) Oral two times a day  predniSONE   Tablet 10 milliGRAM(s) Oral daily  ramelteon 8 milliGRAM(s) Oral at bedtime  sevelamer carbonate 800 milliGRAM(s) Oral three times a day with meals  sirolimus 7 milliGRAM(s) Oral <User Schedule>  trimethoprim   80 mG/sulfamethoxazole 400 mG 1 Tablet(s) Oral daily  valGANciclovir 450 milliGRAM(s) Oral <User Schedule>  warfarin 5 milliGRAM(s) Oral once    MEDICATIONS  (PRN):  acetaminophen     Tablet .. 650 milliGRAM(s) Oral every 6 hours PRN Temp greater or equal to 38C (100.4F), Mild Pain (1 - 3)  dextrose Oral Gel 15 Gram(s) Oral once PRN Blood Glucose LESS THAN 70 milliGRAM(s)/deciliter  polyethylene glycol 3350 17 Gram(s) Oral daily PRN Constipation    Vital Signs Last 24 Hrs  T(F): 98 (05 Oct 2022 07:37), Max: 98.3 (04 Oct 2022 11:39)  HR: 69 (05 Oct 2022 07:37) (60 - 83)  BP: 146/66 (05 Oct 2022 07:37) (133/63 - 169/65)  RR: 14 (05 Oct 2022 07:37) (14 - 20)  SpO2: 94% (05 Oct 2022 07:37) (94% - 100%)  I&O's Summary    04 Oct 2022 07:01  -  05 Oct 2022 07:00  --------------------------------------------------------  IN: 240 mL / OUT: 1500 mL / NET: -1260 mL      PHYSICAL EXAM:  General: NAD, sitting in chair  ENT: MMM, no scleral icterus  Neck: Supple, No JVD, no thyroidomegaly  Lungs: Clear to auscultation bilaterally, no wheezes, no rales, no rhonchi, good inspiratory effort  Cardio: RRR, S1/S2, No murmurs  Abdomen: Soft, Nontender, Nondistended; Bowel sounds present  Extremities: No calf tenderness, No pitting edema, no skin changes    LABS:                        8.5    4.98  )-----------( 339      ( 04 Oct 2022 15:28 )             27.5     10-04    135  |  95  |  75  ----------------------------<  83  4.9   |  25  |  6.23    Ca    9.2      04 Oct 2022 15:28  Phos  4.0     10-04    TPro  7.1  /  Alb  2.1  /  TBili  0.4  /  DBili  x   /  AST  20  /  ALT  26  /  AlkPhos  581  10-04      PT/INR - ( 05 Oct 2022 06:18 )   PT: 23.1 sec;   INR: 1.98 ratio         PTT - ( 03 Oct 2022 06:30 )  PTT:37.3 sec        07-27 Chol -- LDL -- HDL -- Trig 118 mg/dL              POCT Blood Glucose.: 142 mg/dL (05 Oct 2022 07:57)  POCT Blood Glucose.: 180 mg/dL (04 Oct 2022 22:20)  POCT Blood Glucose.: 83 mg/dL (04 Oct 2022 18:49)  POCT Blood Glucose.: 152 mg/dL (04 Oct 2022 12:20)          COVID-19 PCR: NotDetec (10-03-22 @ 05:50)  COVID-19 PCR: NotDetec (09-27-22 @ 08:00)  COVID-19 PCR: NotDetec (09-21-22 @ 05:45)  COVID-19 PCR: NotDetec (09-15-22 @ 05:41)  COVID-19 PCR: NotDetec (09-09-22 @ 07:43)      RADIOLOGY & ADDITIONAL TESTS: reviewed labs    Care Discussed with Consultants/Other Providers: discussed with Dr. Yip

## 2022-10-05 NOTE — PROGRESS NOTE ADULT - SUBJECTIVE AND OBJECTIVE BOX
No CP, SOB    Vital Signs Last 24 Hrs  T(C): 36.7 (10-05-22 @ 07:37), Max: 36.7 (10-05-22 @ 07:37)  T(F): 98 (10-05-22 @ 07:37), Max: 98 (10-05-22 @ 07:37)  HR: 69 (10-05-22 @ 07:37) (69 - 83)  BP: 146/66 (10-05-22 @ 07:37) (138/67 - 146/66)  RR: 14 (10-05-22 @ 07:37) (14 - 16)  SpO2: 94% (10-05-22 @ 07:37) (94% - 94%)    s1s2  b/l air entry  soft, ND  no edema                                                                                                      8.5    4.98  )-----------( 339      ( 04 Oct 2022 15:28 )             27.5     04 Oct 2022 15:28    135    |  95     |  75     ----------------------------<  83     4.9     |  25     |  6.23     Ca    9.2        04 Oct 2022 15:28  Phos  4.0       04 Oct 2022 15:28    TPro  7.1    /  Alb  2.1    /  TBili  0.4    /  DBili  x      /  AST  20     /  ALT  26     /  AlkPhos  581    04 Oct 2022 15:28    LIVER FUNCTIONS - ( 04 Oct 2022 15:28 )  Alb: 2.1 g/dL / Pro: 7.1 g/dL / ALK PHOS: 581 U/L / ALT: 26 U/L / AST: 20 U/L / GGT: x           PT/INR - ( 05 Oct 2022 06:18 )   PT: 23.1 sec;   INR: 1.98 ratio      acetaminophen     Tablet .. 650 milliGRAM(s) Oral every 6 hours PRN  amLODIPine   Tablet 10 milliGRAM(s) Oral daily  artificial  tears Solution 2 Drop(s) Both EYES every 6 hours  brivaracetam 50 milliGRAM(s) Oral <User Schedule>  brivaracetam 50 milliGRAM(s) Oral <User Schedule>  carvedilol 12.5 milliGRAM(s) Oral every 12 hours  chlorhexidine 2% Cloths 1 Application(s) Topical <User Schedule>  dextrose 5%. 1000 milliLiter(s) IV Continuous <Continuous>  dextrose 5%. 1000 milliLiter(s) IV Continuous <Continuous>  dextrose 50% Injectable 25 Gram(s) IV Push once  dextrose 50% Injectable 25 Gram(s) IV Push once  dextrose 50% Injectable 12.5 Gram(s) IV Push once  dextrose Oral Gel 15 Gram(s) Oral once PRN  doxazosin 4 milliGRAM(s) Oral <User Schedule>  epoetin cortney-epbx (RETACRIT) Injectable 63958 Unit(s) IV Push <User Schedule>  finasteride 5 milliGRAM(s) Oral daily  furosemide    Tablet 80 milliGRAM(s) Oral daily  glucagon  Injectable 1 milliGRAM(s) IntraMuscular once  insulin glargine Injectable (LANTUS) 5 Unit(s) SubCutaneous at bedtime  insulin lispro (ADMELOG) corrective regimen sliding scale   SubCutaneous three times a day before meals  insulin lispro (ADMELOG) corrective regimen sliding scale   SubCutaneous at bedtime  insulin lispro Injectable (ADMELOG) 5 Unit(s) SubCutaneous three times a day before meals  levothyroxine 50 MICROGram(s) Oral daily  multivitamin 1 Tablet(s) Oral daily  mycophenolate mofetil 500 milliGRAM(s) Oral <User Schedule>  nystatin    Suspension 095599 Unit(s) Oral four times a day  pantoprazole    Tablet 40 milliGRAM(s) Oral <User Schedule>  phenytoin   Capsule 200 milliGRAM(s) Oral two times a day  polyethylene glycol 3350 17 Gram(s) Oral daily PRN  predniSONE   Tablet 10 milliGRAM(s) Oral daily  ramelteon 8 milliGRAM(s) Oral at bedtime  sevelamer carbonate 800 milliGRAM(s) Oral three times a day with meals  sirolimus 7 milliGRAM(s) Oral <User Schedule>  trimethoprim   80 mG/sulfamethoxazole 400 mG 1 Tablet(s) Oral daily  valGANciclovir 450 milliGRAM(s) Oral <User Schedule>  warfarin 5 milliGRAM(s) Oral once    A/P:    S/p complicated hospital course as per HPI  S/p DDRT 4/21/22, required HD, came off HD since 5/5/22, back on HD since 7/27/22  Continue transplant meds per transplant team recommendations  Avoid nephrotoxins  Epoetin w/HD 3 x week  TMP as able  Renal diet  S/p stable HD yesterday  Next HD on Friday as per the pt's op MWF HD schedule   D/w pt and family at bedside    [phone]

## 2022-10-05 NOTE — PROGRESS NOTE ADULT - NUTRITIONAL ASSESSMENT
This patient has been assessed with a concern for Malnutrition and has been determined to have a diagnosis/diagnoses of Severe protein-calorie malnutrition.    This patient is being managed with:   Diet Consistent Carbohydrate w/Evening Snack-  Easy to Chew (EASYTOCHEW)  1000mL Fluid Restriction ([id number])  No Concentrated Phosphorus  Supplement Feeding Modality:  Oral  Nepro Cans or Servings Per Day:  1       Frequency:  Two Times a day  Entered: Sep 12 2022  4:18PM    
This patient has been assessed with a concern for Malnutrition and has been determined to have a diagnosis/diagnoses of Severe protein-calorie malnutrition.    This patient is being managed with:   Diet Consistent Carbohydrate w/Evening Snack-  Easy to Chew (EASYTOCHEW)  1000mL Fluid Restriction ([id number])  No Concentrated Phosphorus  Supplement Feeding Modality:  Oral  Nepro Cans or Servings Per Day:  1       Frequency:  Two Times a day  Entered: Sep 12 2022  4:18PM    
This patient has been assessed with a concern for Malnutrition and has been determined to have a diagnosis/diagnoses of Severe protein-calorie malnutrition.    This patient is being managed with:   Diet Consistent Carbohydrate w/Evening Snack-  Easy to Chew (EASYTOCHEW)  1000mL Fluid Restriction ([id number])  No Concentrated Phosphorus  Supplement Feeding Modality:  Oral  Nepro Cans or Servings Per Day:  1       Frequency:  Two Times a day  Entered: Sep 12 2022  4:18PM    
This patient has been assessed with a concern for Malnutrition and has been determined to have a diagnosis/diagnoses of Severe protein-calorie malnutrition.    This patient is being managed with:   Diet Consistent Carbohydrate w/Evening Snack-  Easy to Chew (EASYTOCHEW)  1000mL Fluid Restriction ([id number])  No Concentrated Phosphorus  Supplement Feeding Modality:  Oral  Nepro Cans or Servings Per Day:  1       Frequency:  Two Times a day  Entered: Sep 12 2022  4:18PM    
This patient has been assessed with a concern for Malnutrition and has been determined to have a diagnosis/diagnoses of Severe protein-calorie malnutrition.    This patient is being managed with:   Diet Consistent Carbohydrate w/Evening Snack-  Easy to Chew (EASYTOCHEW)  1000mL Fluid Restriction ([id number])  No Concentrated Phosphorus  Supplement Feeding Modality:  Oral  Nepro Cans or Servings Per Day:  1       Frequency:  Two Times a day  Entered: Sep 12 2022  4:18PM    
This patient has been assessed with a concern for Malnutrition and has been determined to have a diagnosis/diagnoses of Severe protein-calorie malnutrition.    This patient is being managed with:   Diet Consistent Carbohydrate w/Evening Snack-  Easy to Chew (EASYTOCHEW)  1000mL Fluid Restriction ([id number])  No Concentrated Phosphorus  Supplement Feeding Modality:  Oral  Nepro Cans or Servings Per Day:  1       Frequency:  Two Times a day  Entered: Sep 12 2022  4:18PM    
This patient has been assessed with a concern for Malnutrition and has been determined to have a diagnosis/diagnoses of Severe protein-calorie malnutrition.    This patient is being managed with:   Diet Consistent Carbohydrate w/Evening Snack-  Easy to Chew (EASYTOCHEW)  1000mL Fluid Restriction ([id number])  No Concentrated Phosphorus  Supplement Feeding Modality:  Oral  Nepro Cans or Servings Per Day:  1       Frequency:  Two Times a day  Entered: Sep 12 2022  4:18PM    
This patient has been assessed with a concern for Malnutrition and has been determined to have a diagnosis/diagnoses of Severe protein-calorie malnutrition.    This patient is being managed with:   Diet Consistent Carbohydrate w/Evening Snack-  Easy to Chew (EASYTOCHEW)  1000mL Fluid Restriction ([id number])  No Concentrated Phosphorus  Supplement Feeding Modality:  Oral  Nepro Cans or Servings Per Day:  1       Frequency:  Two Times a day  Entered: Sep 12 2022  4:18PM    
This patient has been assessed with a concern for Malnutrition and has been determined to have a diagnosis/diagnoses of Severe protein-calorie malnutrition.    This patient is being managed with:   Diet Consistent Carbohydrate w/Evening Snack-  Easy to Chew (EASYTOCHEW)  1000mL Fluid Restriction ([id number])  No Concentrated Phosphorus  Supplement Feeding Modality:  Oral  Nepro Cans or Servings Per Day:  1       Frequency:  Two Times a day  Entered: Sep 12 2022  4:18PM    Diet Consistent Carbohydrate w/Evening Snack-  Easy to Chew (EASYTOCHEW)  1000mL Fluid Restriction ([id number])  No Concentrated Phosphorus  Supplement Feeding Modality:  Oral  Nepro Cans or Servings Per Day:  1       Frequency:  Three Times a day  Entered: Sep  9 2022 11:09AM    The following pending diet order is being considered for treatment of Severe protein-calorie malnutrition:null
This patient has been assessed with a concern for Malnutrition and has been determined to have a diagnosis/diagnoses of Severe protein-calorie malnutrition.    This patient is being managed with:   Diet Consistent Carbohydrate w/Evening Snack-  Easy to Chew (EASYTOCHEW)  1000mL Fluid Restriction ([id number])  No Concentrated Phosphorus  Supplement Feeding Modality:  Oral  Nepro Cans or Servings Per Day:  1       Frequency:  Two Times a day  Entered: Sep 12 2022  4:18PM    
This patient has been assessed with a concern for Malnutrition and has been determined to have a diagnosis/diagnoses of Severe protein-calorie malnutrition.    This patient is being managed with:   Diet Consistent Carbohydrate w/Evening Snack-  Easy to Chew (EASYTOCHEW)  1000mL Fluid Restriction ([id number])  No Concentrated Phosphorus  Supplement Feeding Modality:  Oral  Nepro Cans or Servings Per Day:  1       Frequency:  Two Times a day  Entered: Sep 12 2022  4:18PM    
This patient has been assessed with a concern for Malnutrition and has been determined to have a diagnosis/diagnoses of Severe protein-calorie malnutrition.    This patient is being managed with:   Diet NPO-  NPO for Procedure/Test     NPO Start Date: 02-Oct-2022   NPO Start Time: 11:59  Except Medications  Entered: Sep 30 2022  6:28PM    Diet Consistent Carbohydrate w/Evening Snack-  Easy to Chew (EASYTOCHEW)  1000mL Fluid Restriction ([id number])  No Concentrated Phosphorus  Supplement Feeding Modality:  Oral  Nepro Cans or Servings Per Day:  1       Frequency:  Two Times a day  Entered: Sep 12 2022  4:18PM    
This patient has been assessed with a concern for Malnutrition and has been determined to have a diagnosis/diagnoses of Severe protein-calorie malnutrition.    This patient is being managed with:   Diet Consistent Carbohydrate w/Evening Snack-  Easy to Chew (EASYTOCHEW)  1000mL Fluid Restriction ([id number])  No Concentrated Phosphorus  Supplement Feeding Modality:  Oral  Nepro Cans or Servings Per Day:  1       Frequency:  Three Times a day  Entered: Sep  9 2022 11:09AM    
This patient has been assessed with a concern for Malnutrition and has been determined to have a diagnosis/diagnoses of Severe protein-calorie malnutrition.    This patient is being managed with:   Diet Consistent Carbohydrate w/Evening Snack-  Easy to Chew (EASYTOCHEW)  1000mL Fluid Restriction ([id number])  No Concentrated Phosphorus  Supplement Feeding Modality:  Oral  Nepro Cans or Servings Per Day:  1       Frequency:  Three Times a day  Entered: Sep  9 2022 11:09AM    
This patient has been assessed with a concern for Malnutrition and has been determined to have a diagnosis/diagnoses of Severe protein-calorie malnutrition.    This patient is being managed with:   Diet Consistent Carbohydrate w/Evening Snack-  Easy to Chew (EASYTOCHEW)  1000mL Fluid Restriction ([id number])  No Concentrated Phosphorus  Supplement Feeding Modality:  Oral  Nepro Cans or Servings Per Day:  1       Frequency:  Two Times a day  Entered: Sep 12 2022  4:18PM    
This patient has been assessed with a concern for Malnutrition and has been determined to have a diagnosis/diagnoses of Severe protein-calorie malnutrition.    This patient is being managed with:   Diet Consistent Carbohydrate w/Evening Snack-  Easy to Chew (EASYTOCHEW)  1000mL Fluid Restriction ([id number])  No Concentrated Phosphorus  Supplement Feeding Modality:  Oral  Nepro Cans or Servings Per Day:  1       Frequency:  Two Times a day  Entered: Sep 12 2022  4:18PM    
This patient has been assessed with a concern for Malnutrition and has been determined to have a diagnosis/diagnoses of Severe protein-calorie malnutrition.    This patient is being managed with:   Diet Consistent Carbohydrate w/Evening Snack-  Easy to Chew (EASYTOCHEW)  1000mL Fluid Restriction ([id number])  No Concentrated Phosphorus  Supplement Feeding Modality:  Oral  Nepro Cans or Servings Per Day:  1       Frequency:  Two Times a day  Entered: Sep 12 2022  4:18PM    
This patient has been assessed with a concern for Malnutrition and has been determined to have a diagnosis/diagnoses of Severe protein-calorie malnutrition.    This patient is being managed with:   Diet Consistent Carbohydrate w/Evening Snack-  Easy to Chew (EASYTOCHEW)  1000mL Fluid Restriction ([id number])  No Concentrated Phosphorus  Supplement Feeding Modality:  Oral  Nepro Cans or Servings Per Day:  1       Frequency:  Two Times a day  Entered: Sep 12 2022  4:18PM    
This patient has been assessed with a concern for Malnutrition and has been determined to have a diagnosis/diagnoses of Severe protein-calorie malnutrition.    This patient is being managed with:   Diet Consistent Carbohydrate w/Evening Snack-  Easy to Chew (EASYTOCHEW)  1000mL Fluid Restriction ([id number])  No Concentrated Phosphorus  Supplement Feeding Modality:  Oral  Nepro Cans or Servings Per Day:  1       Frequency:  Two Times a day  Entered: Sep 12 2022  4:18PM    
This patient has been assessed with a concern for Malnutrition and has been determined to have a diagnosis/diagnoses of Severe protein-calorie malnutrition.    This patient is being managed with:   Diet Consistent Carbohydrate w/Evening Snack-  Easy to Chew (EASYTOCHEW)  1000mL Fluid Restriction ([id number])  No Concentrated Phosphorus  Supplement Feeding Modality:  Oral  Nepro Cans or Servings Per Day:  1       Frequency:  Three Times a day  Entered: Sep  9 2022 11:09AM    
This patient has been assessed with a concern for Malnutrition and has been determined to have a diagnosis/diagnoses of Severe protein-calorie malnutrition.    This patient is being managed with:   Diet Consistent Carbohydrate w/Evening Snack-  Easy to Chew (EASYTOCHEW)  1000mL Fluid Restriction ([id number])  No Concentrated Phosphorus  Supplement Feeding Modality:  Oral  Nepro Cans or Servings Per Day:  1       Frequency:  Two Times a day  Entered: Sep 12 2022  4:18PM    
This patient has been assessed with a concern for Malnutrition and has been determined to have a diagnosis/diagnoses of Severe protein-calorie malnutrition.    This patient is being managed with:   Diet Consistent Carbohydrate w/Evening Snack-  Easy to Chew (EASYTOCHEW)  1000mL Fluid Restriction ([id number])  No Concentrated Phosphorus  Supplement Feeding Modality:  Oral  Nepro Cans or Servings Per Day:  1       Frequency:  Two Times a day  Entered: Sep 12 2022  4:18PM    
This patient has been assessed with a concern for Malnutrition and has been determined to have a diagnosis/diagnoses of Severe protein-calorie malnutrition.    This patient is being managed with:   Diet Consistent Carbohydrate w/Evening Snack-  Easy to Chew (EASYTOCHEW)  1000mL Fluid Restriction ([id number])  No Concentrated Phosphorus  Supplement Feeding Modality:  Oral  Nepro Cans or Servings Per Day:  1       Frequency:  Two Times a day  Entered: Sep 12 2022  4:18PM    
This patient has been assessed with a concern for Malnutrition and has been determined to have a diagnosis/diagnoses of Severe protein-calorie malnutrition.    This patient is being managed with:   Diet Consistent Carbohydrate w/Evening Snack-  Easy to Chew (EASYTOCHEW)  1000mL Fluid Restriction ([id number])  No Concentrated Phosphorus  Supplement Feeding Modality:  Oral  Nepro Cans or Servings Per Day:  1       Frequency:  Two Times a day  Entered: Oct  3 2022  4:18PM    
This patient has been assessed with a concern for Malnutrition and has been determined to have a diagnosis/diagnoses of Severe protein-calorie malnutrition.    This patient is being managed with:   Diet Consistent Carbohydrate w/Evening Snack-  Easy to Chew (EASYTOCHEW)  1000mL Fluid Restriction ([id number])  No Concentrated Phosphorus  Supplement Feeding Modality:  Oral  Nepro Cans or Servings Per Day:  1       Frequency:  Two Times a day  Entered: Sep 12 2022  4:18PM    
This patient has been assessed with a concern for Malnutrition and has been determined to have a diagnosis/diagnoses of Severe protein-calorie malnutrition.    This patient is being managed with:   Diet Clear Liquid-  Entered: Oct  2 2022  7:00AM    
This patient has been assessed with a concern for Malnutrition and has been determined to have a diagnosis/diagnoses of Severe protein-calorie malnutrition.    This patient is being managed with:   Diet Consistent Carbohydrate w/Evening Snack-  Easy to Chew (EASYTOCHEW)  1000mL Fluid Restriction ([id number])  No Concentrated Phosphorus  Supplement Feeding Modality:  Oral  Nepro Cans or Servings Per Day:  1       Frequency:  Two Times a day  Entered: Sep 12 2022  4:18PM    
This patient has been assessed with a concern for Malnutrition and has been determined to have a diagnosis/diagnoses of Severe protein-calorie malnutrition.    This patient is being managed with:   Diet Consistent Carbohydrate w/Evening Snack-  Easy to Chew (EASYTOCHEW)  1000mL Fluid Restriction ([id number])  No Concentrated Phosphorus  Supplement Feeding Modality:  Oral  Nepro Cans or Servings Per Day:  1       Frequency:  Three Times a day  Entered: Sep  9 2022 11:09AM    
This patient has been assessed with a concern for Malnutrition and has been determined to have a diagnosis/diagnoses of Severe protein-calorie malnutrition.    This patient is being managed with:   Diet Consistent Carbohydrate w/Evening Snack-  Easy to Chew (EASYTOCHEW)  1000mL Fluid Restriction ([id number])  No Concentrated Phosphorus  Supplement Feeding Modality:  Oral  Nepro Cans or Servings Per Day:  1       Frequency:  Two Times a day  Entered: Sep 12 2022  4:18PM    
This patient has been assessed with a concern for Malnutrition and has been determined to have a diagnosis/diagnoses of Severe protein-calorie malnutrition.    This patient is being managed with:   Diet Consistent Carbohydrate w/Evening Snack-  Easy to Chew (EASYTOCHEW)  1000mL Fluid Restriction ([id number])  No Concentrated Phosphorus  Supplement Feeding Modality:  Oral  Nepro Cans or Servings Per Day:  1       Frequency:  Two Times a day  Entered: Sep 12 2022  4:18PM    
This patient has been assessed with a concern for Malnutrition and has been determined to have a diagnosis/diagnoses of Severe protein-calorie malnutrition.    This patient is being managed with:   Diet Consistent Carbohydrate w/Evening Snack-  Easy to Chew (EASYTOCHEW)  1000mL Fluid Restriction ([id number])  No Concentrated Phosphorus  Supplement Feeding Modality:  Oral  Nepro Cans or Servings Per Day:  1       Frequency:  Three Times a day  Entered: Oct  4 2022 12:38PM    
This patient has been assessed with a concern for Malnutrition and has been determined to have a diagnosis/diagnoses of Severe protein-calorie malnutrition.    This patient is being managed with:   Diet Consistent Carbohydrate w/Evening Snack-  Easy to Chew (EASYTOCHEW)  1000mL Fluid Restriction ([id number])  No Concentrated Phosphorus  Supplement Feeding Modality:  Oral  Nepro Cans or Servings Per Day:  1       Frequency:  Two Times a day  Entered: Sep 12 2022  4:18PM    
This patient has been assessed with a concern for Malnutrition and has been determined to have a diagnosis/diagnoses of Severe protein-calorie malnutrition.    This patient is being managed with:   Diet Consistent Carbohydrate w/Evening Snack-  Easy to Chew (EASYTOCHEW)  1000mL Fluid Restriction ([id number])  No Concentrated Phosphorus  Supplement Feeding Modality:  Oral  Nepro Cans or Servings Per Day:  1       Frequency:  Three Times a day  Entered: Sep  9 2022 11:09AM    
This patient has been assessed with a concern for Malnutrition and has been determined to have a diagnosis/diagnoses of Severe protein-calorie malnutrition.    This patient is being managed with:   Diet Consistent Carbohydrate w/Evening Snack-  Easy to Chew (EASYTOCHEW)  1000mL Fluid Restriction ([id number])  No Concentrated Phosphorus  Supplement Feeding Modality:  Oral  Nepro Cans or Servings Per Day:  1       Frequency:  Two Times a day  Entered: Sep 12 2022  4:18PM    
This patient has been assessed with a concern for Malnutrition and has been determined to have a diagnosis/diagnoses of Severe protein-calorie malnutrition.    This patient is being managed with:   Diet Consistent Carbohydrate w/Evening Snack-  Easy to Chew (EASYTOCHEW)  1000mL Fluid Restriction ([id number])  No Concentrated Phosphorus  Supplement Feeding Modality:  Oral  Nepro Cans or Servings Per Day:  1       Frequency:  Two Times a day  Entered: Sep 12 2022  4:18PM    
This patient has been assessed with a concern for Malnutrition and has been determined to have a diagnosis/diagnoses of Severe protein-calorie malnutrition.    This patient is being managed with:   Diet Consistent Carbohydrate w/Evening Snack-  Easy to Chew (EASYTOCHEW)  1000mL Fluid Restriction ([id number])  No Concentrated Phosphorus  Supplement Feeding Modality:  Oral  Nepro Cans or Servings Per Day:  1       Frequency:  Two Times a day  Entered: Sep 12 2022  4:18PM    
This patient has been assessed with a concern for Malnutrition and has been determined to have a diagnosis/diagnoses of Severe protein-calorie malnutrition.    This patient is being managed with:   Diet Consistent Carbohydrate w/Evening Snack-  Easy to Chew (EASYTOCHEW)  1000mL Fluid Restriction ([id number])  No Concentrated Phosphorus  Supplement Feeding Modality:  Oral  Nepro Cans or Servings Per Day:  1       Frequency:  Two Times a day  Entered: Sep 12 2022  4:18PM    
This patient has been assessed with a concern for Malnutrition and has been determined to have a diagnosis/diagnoses of Severe protein-calorie malnutrition.    This patient is being managed with:   Diet NPO after Midnight-     NPO Start Date: 02-Oct-2022   NPO Start Time: 23:59  Except Medications  Entered: Oct  2 2022  4:23PM    Diet Clear Liquid-  Entered: Oct  2 2022  7:00AM    
This patient has been assessed with a concern for Malnutrition and has been determined to have a diagnosis/diagnoses of Severe protein-calorie malnutrition.    This patient is being managed with:   Diet Consistent Carbohydrate w/Evening Snack-  Easy to Chew (EASYTOCHEW)  1000mL Fluid Restriction ([id number])  No Concentrated Phosphorus  Supplement Feeding Modality:  Oral  Nepro Cans or Servings Per Day:  1       Frequency:  Two Times a day  Entered: Sep 12 2022  4:18PM    
This patient has been assessed with a concern for Malnutrition and has been determined to have a diagnosis/diagnoses of Severe protein-calorie malnutrition.    This patient is being managed with:   Diet Consistent Carbohydrate w/Evening Snack-  Easy to Chew (EASYTOCHEW)  1000mL Fluid Restriction ([id number])  No Concentrated Phosphorus  Supplement Feeding Modality:  Oral  Nepro Cans or Servings Per Day:  1       Frequency:  Two Times a day  Entered: Sep 12 2022  4:18PM    
This patient has been assessed with a concern for Malnutrition and has been determined to have a diagnosis/diagnoses of Severe protein-calorie malnutrition.    This patient is being managed with:   Diet Consistent Carbohydrate w/Evening Snack-  Easy to Chew (EASYTOCHEW)  1000mL Fluid Restriction ([id number])  No Concentrated Phosphorus  Supplement Feeding Modality:  Oral  Nepro Cans or Servings Per Day:  1       Frequency:  Two Times a day  Entered: Sep 12 2022  4:18PM    
This patient has been assessed with a concern for Malnutrition and has been determined to have a diagnosis/diagnoses of Severe protein-calorie malnutrition.    This patient is being managed with:   Diet Consistent Carbohydrate w/Evening Snack-  Easy to Chew (EASYTOCHEW)  1000mL Fluid Restriction ([id number])  No Concentrated Phosphorus  Supplement Feeding Modality:  Oral  Nepro Cans or Servings Per Day:  1       Frequency:  Two Times a day  Entered: Sep 12 2022  4:18PM

## 2022-10-05 NOTE — PROGRESS NOTE ADULT - ASSESSMENT
66 yr old Male with PMHx of DM type II, HTN, CAD s/p stents/ CABG (2020), AFib on Eliquis, CVA (2019) d/t Afib, Seizure d/o following CVA, last episode was on 4/8/22, h/o GIB (2/2022) EGD with duodenal ulcer.  ESRD due to DM was on HD since 2019, s/p premacath removal 5/10/22 s/p Right DDRT (4/21/22) and placed on belatacept.  Frequent hospitalization d/t weakness, refractory sz, and sepsis.  Admitted and intubated 6/10, found to large pleural effusion s/p thoracentesis ~1.3 and VATS of thoracic 2.2 L.      * INR therapeutic  * DC home today    TRANSPLANT PATIENT -  *Coumadin 5mg ordered for (10/4).  Pending INR to be taken with HD today   * Anemia of chronic disease -8.6 (10/4)  *FOBT positive (9/30) - Colonoscopy (10/3) - gastritis + small hemorrhoids, biopsy taken  * transplant team PHONE  --Iker Alfred (MD), Dr. Eduardo Barber (transplant surgeon) - Darleen Dior NP [phone]*  * Barby NP - recommendations for labs: BK virus DNA (negative 9/29), lactate ( 340 9/22 -> 257 9/29 ), mag (1.9 - 1.8 9/29 ), parathyroid (178 -> 183 9/29), phosphorus ( 3.7 -> 3.8 9/29), CRP (190 -> 200 9/29), uric acid (3.2 -> 3.6 9/29), and UA (oliguria).  CMV pcr (negative 9/29)  *Barby NP emailed in regards to lab monitoring and to review discharge medications ---- Transplant NP esubmit all medications to cfam vivo -> brivaracetam will be $350, Neuro consult appreciated.  Patient and family would like to continue on current regimen.  Prefer to have outpatient f/u with primary neuro to discuss med management.  * HD T/TH/Sat - need additional brivaracetam after HD    COMORBIDITES/ACTIVE MEDICAL ISSUES   Gait Instability, ADL impairments and Functional impairments secondary to recurrent UTI, status epilepticus, with debility seizures. Start Comprehensive Rehab Program of PT/OT/SLP * therapy changes: d/c SLP, 90 PT, 90 OT    # Sepsis  - recurrent UTI treated with abx  - pleural effusion s/p thoracentesis 1.3L   - hemothorax s/p VATS 2.2 L  - BCx (8/14) negative  - ID following      #Seizure  - on Phenytoin  - Noted on EEG, +Brivaracetam (8/16)  - Additional Brivaracetam 50mg post each HD session***  - seizure precaution  --Family want to pay the out of pocket cost for AED    #ESRD was on HD til 4/29/2022 s/p DDRT  - restarted on HD (7/29)  - 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  Belatacept d/t 8/1 and was started on Sirolimus 7mg QD  - Prednisone 10  - Cellcept 250 BID on hold  - S/p perma cath placement 8/30/2022  - PPx medications: Nystatin, Bactrim, Valcyte (MWF)  - Nephrology following - modified to MWF ->  blood transfusion with HD on 10/1 -> now switched to T/TH/Sat  - Strongyloides Antibodies positive - Ivermectin x1 dose (9/21)  --Transplant team review 10/4-- Dr Frankie Long , transplant physician--recs to d/c valcyte in the setting of anemia    #Anemia of chronic disease  - Has gotten total of 6U PRBC and 2 U FFP during acute hospital   - monitor H/H 8.0 (9/12) - 7.4 (9/30) -> 8.6 (10/3 (post transfusion)  - FOBT positive --- GI consult appreciated, Colonoscopy for 10/3 - gastritis + small hemorrhoids, biopsy taken  - iron panel low -> 1 U PRBC with HD (10/1)    #HTN  #Afib  - Eliquis switched to Coumadin  - Coreg   - Daily INR til therapeutic on a consistent coumadin dose     #Diabetes  - A1C  - Lantus  - standing Admelog dc, restarted 9/16   - FS + ISS  - Hospitalist following   - FS  163 - 257 (10/4)    #Hypothyroidism  - Synthroid 37.5 mcg    #Pain control  - Tylenol PRN    #GI/Bowel Mgmt   - At risk for constipation due to neurologic diagnosis, immobility and/or medication use  - Senna d/c d/t loose BM 9/11  -  Miralax PRN    #Bladder management  - incontinence     #DVT   - acute DVT to R IJ thrombosis, Superficial vein thrombus to right basilic and cephalic vein  - Coumadin   - SCDs, TEDs     #Skin:  -Stage 2 pressure ulcer to sacrum 0.3 x 0.2- cleanse with NS, apply wet gauze andcover with allevyn foam dressing  -At risk for pressure injury due to neurologic diagnosis and relative immobility  -Bilateral heels - soft heel protectors, apply liquid barrier film daily and monitor for tissue type changes  - Turn Q2 hr while in bed, air mattress  - Skin barrier cream as needed  - Nursing to monitor skin Qshift    Diet  - Regular/easy to chew + Thins  [CCHO, renal diet]    + Fluid Restriction: 1L    Precautions / PROPHYLAXIS:   - Falls, Cardiac, Seizure   - ortho: Weight bearing status: WBAT   - Lungs: Aspiration, Incentive Spirometer   - Pressure injury/Skin: Turn Q2hrs while in bed, OOB to Chair, PT/OT      liaison with family 9/12--Spoke with son Dr Mclaughlinun  He gave update on patient's clinical state, investigation results and function prior to acute rehab treatment   I gave update on patients current status, and he was happy with same  Plan to to give interval updates to family as needed    9/22/22--I discussed with his second son (an RN) at patient's bed side  He reports family's plan for home dc, arrangements made to care for patient--his wife is always at home, and sons make time available    10/3--I called son, on phone and discussed result of Endoscropy today, plan to achieve therapeutic INR and ensure approval of sirolimus with Transplant team prior to dc  He was happy with the explanation    10/5--Called patient's son on phone, discussed d/c plan, lab results, plan for HD MWF and he was happy with same    IDT 10/3  lives with spouse and 2 sons in  with 6 JARRETT, 1st FL setup.  PTA needed some assistance with ADLs  Functional status: ADLs min-mod A with max cutes, sequencing, hand placement, transferring min A RW, amb 50' RW min-mod A   Goal: min A/needs hands on A, mod A shower transfer  TDD: 10/5 home ---- awaiting for seizure medication approval     Follow ups:  Andre Patterson)  Clinical Neurophysiology; EEGEpilepsy; Neurology  [address], [address]  [place] [address]  Phone: [phone]  Fax: [phone]  Follow Up Time:     Padmini Lincoln ()  Internal Medicine  [address], [address]  [place] [address]  Phone: [phone]  Fax: [phone]  Follow Up Time:     Iker Alfred)  Internal Medicine; Nephrology  [address] [place] [address]  Phone: [phone]  Fax: [phone]  Follow Up Time:    Dr. Eduardo Barber - Organ Transplant    Iker Alfred  [facility]  NEPHRO [address] [place] [address] D  Scheduled Appointment: 09/20/2022    Iker Alfred  [facility]  NEPHRO [address] [place] [address] D  Scheduled Appointment: 10/20/2022    [facility]  Transplant team

## 2022-10-07 NOTE — H&P ADULT - NSICDXPASTSURGICALHX_GEN_ALL_CORE_FT
Writer sent letter to patient per policy to reschedule cancelled lab and fu appt.    PAST SURGICAL HISTORY:  History of insertion of stent into coronary artery bypass graft 2009 and 2019    S/P CABG x 3 off pump C3L on 8/13/20

## 2022-10-07 NOTE — PROGRESS NOTE ADULT - PROVIDER SPECIALTY LIST ADULT
Pricing given for upper medial fat pad removal. Discussed possibility of xanthelasmas (UL) being removed if cosmetic fat pads added. Will confirm with JPF. Transplant Surgery

## 2022-10-13 NOTE — STROKE CODE NOTE - NIH STROKE SCALE: 10. DYSARTHRIA, QM
(2) Severe dysarthria; patients speech is so slurred as to be unintelligible in the absence of or out of proportion to any dysphasia, or is mute/anarthric Name band;

## 2022-10-19 PROBLEM — N17.9 AKI (ACUTE KIDNEY INJURY): Status: ACTIVE | Noted: 2022-01-01

## 2022-10-19 PROBLEM — Z79.899 LONG TERM CURRENT USE OF IMMUNOSUPPRESSIVE DRUG: Status: ACTIVE | Noted: 2022-01-01

## 2022-10-19 NOTE — REASON FOR VISIT
[Follow-Up] : a follow-up visit [Family Member] : family member [FreeTextEntry1] : Post transplant follow up, recent discharge from rehab

## 2022-10-19 NOTE — HISTORY OF PRESENT ILLNESS
[Other Location: e.g. School (Enter Location, City,State)___] : at [unfilled], at the time of the visit. [Medical Office: ([facility])___] : at the medical office located in  [FreeTextEntry1] : This visit is provided by telehealth using real time 2 way audio visual technology. This patient is located at rehab and the provider is located at the [facility] medical office at [address] [place] [address]. Patient and family member participated in this visit.\par Verbal consent for this visit was given by patient/accompanying family member\par Total duration of this visit which included conversation, lab review, medication review and clinical assessment and advice lasted approximately 25-30 minutes.\par \par \par \par Currently:\par \par Patient is at home, discharged from rehab at [facility] approx 2 weeks previously, has renal failure requiring hemodialysis. He walks with walker about 1 block with assistance. \par Patient feels better. Recovering from generalized weakness. Able to stand on his own and walk with walker. He has home physical therapy support, soon plans to return to [facility] for physical therapy.\par \par No seizures, has been on M/W/F dialysis at  [facility] followed by Dr. Walker.\par \par He is on Warfarin, INR being followed by primary nephrologist.\par \par \par Reviewed for acute symptoms-currently has none.\par Right sided abdominal discoloration has improved and has no longer complains of any pain.\par \par Has appointment with Dr. Beka Martin, neurologist in 1 week.\par \par Taking precautions to prevent COVID exposure\par I reviewed current home  blood pressure and  and medications from discharge summary.\par \par Blood pressure: 118/74- 132/74 HR 60 /min No fever. Weight 64.2 Kg (137.2 Lbs)\par \par Urine output 30 -150 ml/day ml/day\par \par Medications reviewed and updated.\par \par On Telehealth visit:\par Patient appears comfortable\par No distress, not dyspneic\par Conscious, alert, oriented X 3\par Speech: Normal\par \par Reviewed last lab data \par \par (10/13/22) Hemoglobin: 8, Creatinie 3.56 Glucose this AM; 130 mg/l.\par \par

## 2022-10-19 NOTE — ASSESSMENT
[FreeTextEntry1] : Clinical Impression:\par Kidney Transplant recipient, poorly functioning allograft, on dialysis support\par Immunosuppression- Reviewed  , On Rapa/MMF/steroids. Will taper MMF if predialysis creatinine remains elevated. Most recent is a post dialysis which is 3.56.\par DM Controlled.\par HTN controlled\par Seizures- controlled, Neurology follow up scheduled by family\par Anemia: On IGOR\par Plan:\par Immunosuppression reviewed- will taper off MMF after next monthly labs if creatinine predialysis remains elevated\par Continue current medications\par Patient son will send me monthly lab reports when available.\par \par additional changes none made today\par Continue  Physical therapy n discharge\par F/u hemodialysis care with primary nephrologist, Dr. Martinez.\par Labs and follow up visit to be scheduled as discussed.\par \par \par F/u 4 weeks/prn

## 2022-10-26 PROBLEM — E78.5 DYSLIPIDEMIA: Status: ACTIVE | Noted: 2020-10-29

## 2022-10-26 PROBLEM — I48.91 ATRIAL FIBRILLATION: Status: ACTIVE | Noted: 2020-05-14

## 2022-10-26 NOTE — DISCUSSION/SUMMARY
[FreeTextEntry1] : The patient is a 67-year-old gentleman diabetes mellitus, hypertension, hyperlipidemia, hypothyroidism, ESRD, coronary artery disease s/p stroke, afib, s/p 3V CABG, s/p renal transplant complicated by reoperation, seizures, rejection and now on dialysis who is lethargic.\par #1 CAD- s/p CABG, euvolemic, no angina or HF\par #2 Htn- continue coreg 12.5mg bid, son monitors closely\par #3 ESRD- on dialysis, s/p renal transplant with rejection, on sirolimus, \par #4 DM- on insulin\par #5 Afib- now on coumadin, INR pending, no bleeding\par #6 Neuro- on phenytoin and briviact, awaiting change in medications\par #7 Hypothyroid- c/w levothyroxine 50mcg\par #8 ID-  received both COVID vaccines\par #9 - on finasteride and doxazosin\par \par

## 2022-10-26 NOTE — HISTORY OF PRESENT ILLNESS
[Home] : at home, [unfilled] , at the time of the visit. [Medical Office: ([facility])___] : at the medical office located in  [Family Member] : family member [FreeTextEntry1] : Srinivasan had his transplant in April. One week after discharged readmitted when he was weak and reoperated for six hours. Then rehab then seizures. Then bleeding and thoracentesis on eliquis. Changed to coumadin for less contraindications. Back on dialysis because of rejection. Meds changed. Mycophenolate interacting with coumadin and will stop. EGD and colon negative for source of bleeding. Epogen during dialysis. Appt with Dr. Clark Martin neurologist. Lethargic on current med for seizures. May change to keppra 500mg and continue phenytoin.

## 2022-10-31 PROBLEM — Z94.0 RENAL TRANSPLANT RECIPIENT: Status: ACTIVE | Noted: 2022-01-01

## 2022-11-03 PROBLEM — E11.9 DM2 (DIABETES MELLITUS, TYPE 2): Status: ACTIVE | Noted: 2022-01-01

## 2022-11-03 NOTE — PHYSICAL EXAM
[Alert] : alert [No Acute Distress] : no acute distress [Normal Sclera/Conjunctiva] : normal sclera/conjunctiva [Normal Outer Ear/Nose] : the ears and nose were normal in appearance [Normal Hearing] : hearing was normal [No Respiratory Distress] : no respiratory distress [Oriented x3] : oriented to person, place, and time

## 2022-11-03 NOTE — ASSESSMENT
[FreeTextEntry1] : 67 year old male with recent history of CABG, ESRD ( on renal transplant list), DM Type 2 requiring insulin, hypothyroidism here for follow up of DM Type 2. Telehealth visit. \par Extensive hospital stay after renal transplant, however currently on ESRD. \par \par DM Type 2-pending HgA1C \par -On Levemir 6 units at bedtime \par -On Humalog 4-5-5 units before meals ( Increased by a unit prior to breakfast)\par -SS # 1 \par -Advised to cut back on high carb snacks \par -Will order eric for the patient to be able to manage glycemic variation \par -Hypoglcemia treatment discussed \par \par History of CABG\par -Goal HgA1C of 7%\par \par ESRD\par -Increased risk for hypoglycemia\par -Treatment has been discussed \par \par Hypothyroidism\par -Continue Levothyroxine 50 mcg daily \par -Will check TFTs \par \par -Follow up in 4 months. \par

## 2022-11-03 NOTE — HISTORY OF PRESENT ILLNESS
[FreeTextEntry1] : Diabetes Follow-up Patient HPI\par \par Stent placement last June ( Dr Morejon) \par 08/2019: Stroke and was in rehab ( previously was COVID positive) \par He reported that CABG was recent 08/14/2020 \par Currently he is on a transplant renal \par Dialysis for the past year (M, W, F) \par \par In the hospital had non-bleeding ulcers \par Still has to follow up with GI. Dr. Langston \par \par Very extensive hospital stay \par (s/p renal transplant) however underwent rejection. \par On ESRD ( M, W, F) \par \par \par CC\par Patient referred by Dr. Corral for diabetes management.\par \par \par HPI:\par \par Duration of Diabetes: 30 years \par Is patient on Insulin? 10 years \par If yes, how long on insulin? \par \par List Current Medications for Glycemic control and the doses:\par 1- Basaglar 6 units \par 2- Novolog 3-4-4  units before meals,< 100 will give 2 units, SS # 1 \par 3- \par \par SMBG (self monitored blood glucose) readings: \par - Name of glucometer: \par - How often does the patient check BG? \par - Does the patient keep a log? \par \par If detailed record is available, what is the range of most of the BG readings?\par - Before Breakfast: 110-140\par - Before Lunch:140-200\par - Before Dinner: 140-200 (180-230- on days he doesn’t have dialysis) \par - Before Bedtime: 200s\par \par \par \par Does patient get Hypoglycemic episodes? None recently \par If yes how frequent? \par Hoe low do the BG readings reach? 69-70\par When do most of those episodes occur? In the middle of night \par What symptoms does the patient get during those episodes? \par \par Diabetic Complications: Is patient aware of having any of those complications?\par - Eyes: Retinopathy? Retinopathy- laser treatment ( 3 years ago) \par  	When was the last fully dilated eye exam? Last year ( 09/2021) - Dr. Verdin \par - Feet: 	Neuropathy? no\par  	Foot Ulcers? No \par 	When was the last time patient saw a Podiatrist? No \par - Kidneys: Nephropathy? ESRD \par  \par \par \par Very careful with potasssium intake \par \par Diet: review patient's diet: \par Breakfast: Eggs, tea, white bread ( 2 slice), oatmeal, roti \par Lunch: 2 rotis, nikunj, vegetables \par Dinner: 2 roti, chicken, soup\par Snacks: Cheese, cookie with tea, fruits \par Juice or soda: Diet sprite \par \par \par \par Exercise: review patient exercise habits: Walking x 15 mins \par \par Symptoms: Write any symptoms, concerns and issues bothering the patient which have not be addressed above: \par No blurry vision\par \par

## 2022-11-09 NOTE — ED PROVIDER NOTE - OBJECTIVE STATEMENT
67 M with DM2, HTN, CAD with stents/CABG, AF on Coumadin, hx CVA, seizure, PUD, s/p failed renal transplant now back on HD MWF, recent hospitalization for seizures requiring intubation and course complicated by UTI, as well as need for VATS due to large hemothorax, sent by [facility] for low hemoglobin, 6.9. Had a dialysis session for overload yesterday. Sent in for transfusion, w/ dialysis. Pt denies abdominal pain and melena. Family at bedside. 67 M with DM2, HTN, CAD with stents/CABG, AF on Coumadin, hx CVA, seizure, PUD, s/p failed renal transplant now back on HD MWF, recent hospitalization for seizures requiring intubation and course complicated by UTI, as well as need for VATS due to large hemothorax, sent by [facility] for low hemoglobin, 6.9. Had a dialysis session for fluid overload yesterday. Sent in for transfusion, w/ dialysis. Pt denies abdominal pain and melena. Family at bedside. State known history of rejected renal transplant with elevated INR and positive occult stool.  Prior work-up  unequivocal and following with GI team.  No symptoms at this time.  No other current complaints.

## 2022-11-09 NOTE — ED PROVIDER NOTE - PROGRESS NOTE DETAILS
WALDO Lamb: INR 5.63, spoke w/ Dr. Flores, states no need for Vit K. Son Dr. Castelan at bedside, recommends holding coumadin for 2-3 days. Pt to go to dialysis.

## 2022-11-09 NOTE — ED ADULT NURSE NOTE - OBJECTIVE STATEMENT
BREAK RN: pt. received to room 19 A&Ox3 bedbound PMH of CVA w/ residual, failed kidney Tx p/w Low H/H pt. went for dialysis today, and was not dialyzed due to a low H/H (6.9). pt. not symptomatic at this time. NAD noted at this time. respirations even and unlabored on RA. Sinus Martin on CM. pending MD cabrera. comfort measures provided. safety precautions maintained.

## 2022-11-09 NOTE — ED ADULT NURSE NOTE - NSIMPLEMENTINTERV_GEN_ALL_ED
Implemented All Fall Risk Interventions:  Nixon to call system. Call bell, personal items and telephone within reach. Instruct patient to call for assistance. Room bathroom lighting operational. Non-slip footwear when patient is off stretcher. Physically safe environment: no spills, clutter or unnecessary equipment. Stretcher in lowest position, wheels locked, appropriate side rails in place. Provide visual cue, wrist band, yellow gown, etc. Monitor gait and stability. Monitor for mental status changes and reorient to person, place, and time. Review medications for side effects contributing to fall risk. Reinforce activity limits and safety measures with patient and family.

## 2022-11-09 NOTE — ED PROVIDER NOTE - CLINICAL SUMMARY MEDICAL DECISION MAKING FREE TEXT BOX
67 M with DM2, HTN, CAD with stents/CABG, AF on Coumadin, hx CVA, seizure, PUD, s/p failed renal transplant now back on HD MWF, recent hospitalization for seizures requiring intubation and course complicated by UTI, as well as need for VATS due to large hemothorax, [facility], 6.9.  Plan nephrology consult, labs, likely admit for transfusion and dialysis.

## 2022-11-09 NOTE — ED ADULT TRIAGE NOTE - CHIEF COMPLAINT QUOTE
Pt brought in by MD/son from [facility] with low H/H 6.9/ .  Pt is asymptomatic.   Pt with hx of failed kidney transplant. Dialysis MOn/Wed/Frid, got an extra day yesterday due to volume overload.  Pt with hx of CVA , with R side deficits, hospitalized in [place] for 6 mths

## 2022-11-09 NOTE — ED PROVIDER NOTE - ATTENDING APP SHARED VISIT CONTRIBUTION OF CARE
I have personally performed a history and physical examination of the patient and discussed management with the ERNESTO as well as the patient.  I reviewed the ERNESTO's note and agree with the documented findings and plan of care.  I have authored and modified critical sections of the Provider Note, including but not limited to HPI, Physical Exam and MDM.    67 M with DM2, HTN, CAD with stents/CABG, AF on Coumadin, hx CVA, seizure, PUD, s/p failed renal transplant now back on HD MWF, recent hospitalization for seizures requiring intubation and course complicated by UTI, as well as need for VATS due to large hemothorax, sent by [facility] for low hemoglobin, 6.9. Had a dialysis session for fluid overload yesterday. Sent in for transfusion, w/ dialysis. Pt denies abdominal pain and melena. Family at bedside. State known history of rejected renal transplant with elevated INR and positive occult stool.  Prior work-up  unequivocal and following with GI team.  Will obtain CBC, CMP, PT, PTT, INR, TNS.  Case discussed with nephrology.  We will coordinate transfusion with dialysis during admission based on current recommendations.

## 2022-11-09 NOTE — ED ADULT NURSE REASSESSMENT NOTE - NS ED NURSE REASSESS COMMENT FT1
bed assigned 421C report given to TAO Villasenor. dialysis updated, will send pt to floor after dialysis is complete.
report received from TAO Cazares, pt A&Ox4 William speaking. admitted to medicine for anemia 2/2 renal failure. left chest wall dialysis access port, 20G left arm PIV. pt failed dysphagia screen. type and screen pending for blood transfusion.     report given to Michelle in Dialysis. pt pending transport. appears comfortable and in no acute distress at this time. comfort and safety measures provided.
Normal rate, regular rhythm.  Heart sounds S1, S2.  No murmurs, rubs or gallops.

## 2022-11-09 NOTE — ED ADULT NURSE NOTE - CHIEF COMPLAINT QUOTE
Pt brought in by MD/son from [facility] with low H/H 6.9/ .  Pt is asymptomatic.   Pt with hx of failed kidney transplant. Dialysis MOn/Wed/Frid, got an extra day yesterday due to volume overload.  Pt with hx of CVA , with R side deficits, hospitalized in [place] for 6 mths

## 2022-11-09 NOTE — CONSULT NOTE ADULT - ASSESSMENT
66M with DM2, HTN, CAD with stents/CABG, AF on Coumadin, hx CVA, seizure, PUD, s/p failed renal transplant now back on HD, recent hospitalization for seizures requiring intubation and course complicated by UTI, as well as need for VATS due to large hemothorax, sent by [facility] for low hb. HD MWF had extra treatment yesterday due to fluid overload. per family recent GI work up about 1 month ago, also noted to have elevated INR on coumadin    ESRD on HD MWF  from [facility]  last hd 11/8  hd today with transfusion  consent in chart from son  monitor    anemia  recent GI work up  transfuse to keep hb>8  epo with hd  monitor    ckd-mbd  check pth phos   monitor calcium and phos    elevated INR  monitor  hold coumadin

## 2022-11-09 NOTE — ED PROVIDER NOTE - CRITERIA ADMIT PEDS PT
Detail Level: Zone Initiate Treatment: Apply Clindamycin lotion BID on face and chest\\n OTC BPO wash BID on face and chest Render In Strict Bullet Format?: No No

## 2022-11-09 NOTE — H&P ADULT - PROBLEM SELECTOR PLAN 1
Anemia sec to ESRD, recent GI [facility] was unremarkable as outpatient. Needs PRBC transfusion, Hem evaluation in AM.

## 2022-11-09 NOTE — H&P ADULT - HISTORY OF PRESENT ILLNESS
Male with a known PMH of renal transplant failure, still on Cellcept, HTN, DM, A Fib, on coumadin On out patient labs, his Hb was 6.9 and he was brought to the ER. Patient appears comfortable, denoes dyspnea or dizziness.

## 2022-11-10 NOTE — DISCHARGE NOTE PROVIDER - NSDCFUSCHEDAPPT_GEN_ALL_CORE_FT
Andre Patterson  [facility]  NEUROLOGY [address]  Scheduled Appointment: 11/14/2022    Iker Alfred  [facility]  NEPHRO [address] [place] [address]  Scheduled Appointment: 01/19/2023

## 2022-11-10 NOTE — PATIENT PROFILE ADULT - FALL HARM RISK - HARM RISK INTERVENTIONS
Assistance with ambulation/Assistance OOB with selected safe patient handling equipment/Communicate Risk of Fall with Harm to all staff/Discuss with provider need for PT consult/Monitor gait and stability/Reinforce activity limits and safety measures with patient and family/Reorient to person, place and time as needed/Review medications for side effects contributing to fall risk/Sit up slowly, dangle for a short time, stand at bedside before walking/Tailored Fall Risk Interventions/Toileting schedule using arm’s reach rule for commode and bathroom/Use of alarms - bed, chair and/or voice tab/Visual Cue: Yellow wristband and red socks/Bed in lowest position, wheels locked, appropriate side rails in place/Call bell, personal items and telephone in reach/Instruct patient to call for assistance before getting out of bed or chair/Non-slip footwear when patient is out of bed/[place] to call system/Physically safe environment - no spills, clutter or unnecessary equipment/Purposeful Proactive Rounding/Room/bathroom lighting operational, light cord in reach

## 2022-11-10 NOTE — DISCHARGE NOTE NURSING/CASE MANAGEMENT/SOCIAL WORK - PATIENT PORTAL LINK FT
You can access the FollowMyHealth Patient Portal offered by [facility] by registering at the following website: [email or web address]. By joining [employer]’s FollowMyHealth portal, you will also be able to view your health information using other applications (apps) compatible with our system.

## 2022-11-10 NOTE — DISCHARGE NOTE PROVIDER - NSDCFUADDAPPT_GEN_ALL_CORE_FT
Please follow up with your PCP within 1-2 weeks.     We have provided you outpatient referral for Hematology.

## 2022-11-10 NOTE — DISCHARGE NOTE NURSING/CASE MANAGEMENT/SOCIAL WORK - NSDCVIVACCINE_GEN_ALL_CORE_FT
Tdap; 24-Jun-2021 20:00; Marcela Horn (RN); [employer]; [id number] (Exp. Date: 04-Sep-2022); IntraMuscular; Deltoid Left.; 0.5 milliLiter(s); VIS (VIS Published: 09-May-2013, VIS Presented: 24-Jun-2021);   Tdap; 09-Mar-2022 07:35; Wilson Agrawal (RN); [employer]; [id number] (Exp. Date: 28-Sep-2023); IntraMuscular; Deltoid Left.; 0.5 milliLiter(s); VIS (VIS Published: 09-May-2013, VIS Presented: 09-Mar-2022);

## 2022-11-10 NOTE — PROVIDER CONTACT NOTE (OTHER) - ACTION/TREATMENT ORDERED:
as per acp trevene bell reassess pt in morning and if pt fails again make her aware and any po meds that can be converted to iv will be. continuous monitoring remains in place

## 2022-11-10 NOTE — DISCHARGE NOTE NURSING/CASE MANAGEMENT/SOCIAL WORK - NSDCPEFALRISK_GEN_ALL_CORE
For information on Fall & Injury Prevention, visit: [email or web address] OR  [email or web address] OR  https://www.cdc.gov/steadi/patient.html

## 2022-11-10 NOTE — DISCHARGE NOTE PROVIDER - CARE PROVIDER_API CALL
Marianne Flores)  Internal Medicine  [address] [place] [address]  Phone: [phone]  Fax: [phone]  Established Patient  Follow Up Time:

## 2022-11-10 NOTE — DISCHARGE NOTE PROVIDER - NSDCCPCAREPLAN_GEN_ALL_CORE_FT
PRINCIPAL DISCHARGE DIAGNOSIS  Diagnosis: Anemia secondary to renal failure  Assessment and Plan of Treatment: You were admitted to the hospital for anemia with an outpatient hemoglobin of 6.9. However, on repeat laboratory test your hemoglobin improved on it's own to 7.5 and 8.1. Please continue to follow with your PCP for monitoring of your blood counts. Continue to follow with your Nephrologist. We have also provided you hematology referral as outpatient as discussed.      SECONDARY DISCHARGE DIAGNOSES  Diagnosis: DM type 2 causing CKD stage 5  Assessment and Plan of Treatment: Continue your medication regimen and a consistent carbohydrate diet (Meaning eating the same amount of carbohydrates at the same time each day). Monitor blood glucose levels throughout the day before meals and at bedtime. Record blood sugars and bring to outpatient providers appointment in order to be reviewed by your doctor for management modifications. If your sugars are more than 400 or less than 70 you should contact your PCP immediately.   Monitor for signs/symptoms of low blood glucose, such as, dizziness, altered mental status, or cool/clammy skin. In addition, monitor for signs/symptoms of high blood glucose, such as, feeling hot, dry, fatigued, or with increased thirst/urination.   Make regular podiatry appointments in order to have feet checked for wounds and uncontrolled toe nail growth to prevent infections, as well as, appointments with an ophthalmologist to monitor your vision.      Diagnosis: ESRD on dialysis  Assessment and Plan of Treatment: Please continue to follow your dialysis schedule and refer to your primary provider/nephrologist for further care and monitoring of kidney function and electrolytes. Continue a renal restricted diet (Avoiding foods high in potassium and phosphorus), your prescribed medications, and supplementations as directed.      Diagnosis: History of epilepsy  Assessment and Plan of Treatment: Please continue to comply with your seizure medications and follow up with outpatient neurology.    Diagnosis: History of renal transplant  Assessment and Plan of Treatment: Please continue to comply with your transplant medications. Follow up with your nephrologist and transplant team.       Diagnosis: Supratherapeutic INR  Assessment and Plan of Treatment: Your INR was found to be elevated while inpatient. Therefore, your Coumadin was held. This was discussed with your son who is a medical attending and Dr Flores. Please follow up as outpatient for INR checks and Coumadin dosing adjustments as discussed in detail. It is important to have your INR within a therapeutic range for your history of atrial fibrillation.

## 2022-11-10 NOTE — PROVIDER CONTACT NOTE (OTHER) - SITUATION
pt given water for dysphagia screening. pt swallowed water and then after a couple of minutes was laid down flat to go to sleep when pt had a coughing episode. pt was suctioned at bedside

## 2022-11-10 NOTE — DISCHARGE NOTE PROVIDER - HOSPITAL COURSE
Mr Castelan is a 68 y/o male with a known PMH of renal transplant failure on dialysis M-W-F, still on Cellcept, HTN, DM, A Fib on coumadin, sent to the ER at [facility] for outpatient laboratory results showing a hemoglobin of 6.9. Patient was admitted for anemia. However, on repeat laboratory results the patient's hemoglobin improved to 7.5 and 8.1 without intervention. Of note, the patient's INR was found to be supra therapeutic during admission. Therefore, the Coumadin was held while the INR downtrended. The patient's son, [facility] Medical Attending, asked for the patient to be discharged and for the INR to be adjusted as outpatient. Discussed with Dr Flores who will adjust patient Coumadin as outpatient. Hematology outpatient referral also provided.     On 11/10/2022 this case was reviewed with Dr. Flores, the patient is medically stable and optimized for discharge. All medications were reviewed and prescriptions were sent to mutually agreed upon pharmacy.

## 2022-11-10 NOTE — PROGRESS NOTE ADULT - ASSESSMENT
66M with DM2, HTN, CAD with stents/CABG, AF on Coumadin, hx CVA, seizure, PUD, s/p failed renal transplant now back on HD, recent hospitalization for seizures requiring intubation and course complicated by UTI, as well as need for VATS due to large hemothorax, sent by [place] rehab for low hb. HD MWF had extra treatment yesterday due to fluid overload. per family recent GI work up about 1 month ago, also noted to have elevated INR on coumadin    ESRD on HD MWF  from [place]  completed hd 11/9 UF 2.2L  hd tmr  consent in chart from son  monitor    anemia  recent GI work up  s/p transfusion  low iron sat. will start iv iron with hd  epo with hd  monitor    ckd-mbd  check pth phos   monitor calcium and phos    elevated INR  holding coumadin   monitor  hold coumadin 
Mr. Castelan was seen at the beginning of dialysis attempting 2 liters of UF off.

## 2022-11-10 NOTE — SWALLOW BEDSIDE ASSESSMENT ADULT - COMMENTS
"HPI: 66M with DM2, HTN, CAD with stents/CABG, AF on Coumadin, hx CVA, seizure, PUD, s/p failed renal transplant now back on HD, recent hospitalization for seizures requiring intubation and course complicated by UTI, as well as need for VATS due to large hemothorax, sent by [facility] for low hb. HD MWF had extra treatment yesterday due to fluid overload. per family recent GI work up about 1 month ago, also noted to have elevated INR on coumadin. currently denied symptoms".     Patient known to Speech Department at [facility] with Cinesophagram completed on 7/14/22 with recommendations for Regular Solids and Thin Liquids (see report for details).     SLP attempted clinical swallow evaluation this AM. Patient's son, who is an attending MD at [facility] (Dr. Castelan), present at bedside. Son declined swallow evaluation at this time, stating "he doesn't need it". Per son, patient consumes thickened liquids in the home environment. Swallow evaluation not completed at this time given patient's son declined assessment. Please reconsult this service as needed.

## 2022-11-10 NOTE — DISCHARGE NOTE PROVIDER - NSFOLLOWUPCLINICS_GEN_ALL_ED_FT
[facility]  Hematology/Oncology  [address] [id number]  Phone: [phone]  Fax:

## 2022-11-10 NOTE — DISCHARGE NOTE PROVIDER - NSDCMRMEDTOKEN_GEN_ALL_CORE_FT
Admelog SoloStar 100 units/mL injectable solution: 5 unit(s) subcutaneous 3 times a day (with meals)   amLODIPine 10 mg oral tablet: 1 tab(s) orally once a day  brivaracetam 50 mg oral tablet: 1 tab(s) orally 2 times a day (9am, 9pm); BUT on days of dialysis, please take an extra dose after dialysis   MDD:150mg  carvedilol 12.5 mg oral tablet: 1 tab(s) orally every 12 hours  doxazosin 4 mg oral tablet: 1 tab(s) orally 2 times a day   epoetin cortney: 37624 unit(s) intravenous 3 times a week  with HD  finasteride 5 mg oral tablet: 1 tab(s) orally once a day  furosemide 80 mg oral tablet: 1 tab(s) orally once a day  insulin glargine 100 units/mL subcutaneous solution: 5 unit(s) subcutaneous once a day (at bedtime)  levothyroxine 50 mcg (0.05 mg) oral tablet: 1 tab(s) orally once a day  Multiple Vitamins oral tablet: 1 tab(s) orally once a day  ocular lubricant ophthalmic solution: 2 drop(s) to each affected eye every 6 hours, As Needed  pantoprazole 40 mg oral delayed release tablet: 1 tab(s) orally once a day before breakfast  phenytoin 200 mg oral capsule, extended release: 1 cap(s) orally 2 times a day  polyethylene glycol 3350 oral powder for reconstitution: 17 gram(s) orally once a day, As needed, Constipation  predniSONE 10 mg oral tablet: 1 tab(s) orally once a day  ramelteon 8 mg oral tablet: 1 tab(s) orally once a day (at bedtime)  senna leaf extract oral tablet: 2 tab(s) orally once a day (at bedtime), As Needed  sevelamer carbonate 800 mg oral tablet: 1 tab(s) orally 3 times a day (with meals)  sirolimus 1 mg oral tablet: 7 tab(s) orally once a day   sulfamethoxazole-trimethoprim 400 mg-80 mg oral tablet: 1 tab(s) orally once a day  valGANciclovir 450 mg oral tablet: 1 tab(s) orally 3 times a week (mon/wed/fri)

## 2022-11-10 NOTE — PROGRESS NOTE ADULT - SUBJECTIVE AND OBJECTIVE BOX
HEMODIALYSIS NOTE ---Date of Service 11-09-22 @ 22:42  --------------------------------------------------------------------------------  Chief Complaint: ESRD/Ongoing hemodialysis requirement    24 hour events/subjective:    Seen on dialysis getting 2 liters of UF off.     PAST HISTORY  --------------------------------------------------------------------------------  No significant changes to PMH, PSH, FHx, SHx, unless otherwise noted    ALLERGIES & MEDICATIONS  --------------------------------------------------------------------------------  Allergies    No Known Allergies    Intolerances      Standing Inpatient Medications  artificial tears (preservative free) Ophthalmic Solution 1 Drop(s) Both EYES four times a day  brivaracetam 50 milliGRAM(s) Oral <User Schedule>  brivaracetam 50 milliGRAM(s) Oral every 12 hours  carvedilol 12.5 milliGRAM(s) Oral every 12 hours  chlorhexidine 2% Cloths 1 Application(s) Topical daily  dextrose 5%. 1000 milliLiter(s) IV Continuous <Continuous>  dextrose 5%. 1000 milliLiter(s) IV Continuous <Continuous>  dextrose 50% Injectable 25 Gram(s) IV Push once  dextrose 50% Injectable 12.5 Gram(s) IV Push once  dextrose 50% Injectable 25 Gram(s) IV Push once  doxazosin 4 milliGRAM(s) Oral <User Schedule>  epoetin cortney-epbx (RETACRIT) Injectable 63190 Unit(s) IV Push <User Schedule>  finasteride 5 milliGRAM(s) Oral daily  glucagon  Injectable 1 milliGRAM(s) IntraMuscular once  insulin glargine Injectable (LANTUS) 5 Unit(s) SubCutaneous at bedtime  insulin lispro (ADMELOG) corrective regimen sliding scale   SubCutaneous three times a day before meals  levothyroxine 50 MICROGram(s) Oral daily  multivitamin 1 Tablet(s) Oral daily  pantoprazole    Tablet 40 milliGRAM(s) Oral before breakfast  phenytoin   Capsule 200 milliGRAM(s) Oral two times a day  sevelamer carbonate 800 milliGRAM(s) Oral three times a day  sirolimus 7 milliGRAM(s) Oral daily  valGANciclovir 450 milliGRAM(s) Oral daily    PRN Inpatient Medications  acetaminophen     Tablet .. 650 milliGRAM(s) Oral every 6 hours PRN  dextrose Oral Gel 15 Gram(s) Oral once PRN  polyethylene glycol 3350 17 Gram(s) Oral Once PRN      VITALS/PHYSICAL EXAM  --------------------------------------------------------------------------------  T(C): 36.4 (11-09-22 @ 21:30), Max: 37.1 (11-09-22 @ 15:17)  HR: 56 (11-09-22 @ 21:30) (55 - 59)  BP: 127/58 (11-09-22 @ 21:30) (106/41 - 127/58)  RR: 17 (11-09-22 @ 21:30) (16 - 19)  SpO2: 98% (11-09-22 @ 21:09) (98% - 100%)  Wt(kg): --  Height (cm): 177.8 (11-09-22 @ 15:17)      Physical Exam:  	Gen: NAD, well-appearing  	HEENT: PERRL, supple neck, clear oropharynx  	Pulm: CTA B/L  	CV: RRR, S1S2; no rub  	Abd: +BS, soft, nontender/nondistended  	: No suprapubic tenderness  	UE: Warm, FROM, no clubbing, intact strength; no edema; no asterixis  	LE: Warm, FROM, no clubbing, intact strength; no edema  	Vascular access:    LABS/STUDIES  --------------------------------------------------------------------------------              7.5    3.28  >-----------<  248      [11-09-22 @ 18:32]              26.5     137  |  96  |  63  ----------------------------<  247      [11-09-22 @ 18:32]  4.3   |  23  |  3.63        Ca     8.6     [11-09-22 @ 18:32]    TPro  7.0  /  Alb  2.7  /  TBili  0.3  /  DBili  x   /  AST  19  /  ALT  15  /  AlkPhos  298  [11-09-22 @ 18:32]    PT/INR: PT 66.5 , INR 5.63       [11-09-22 @ 18:32]  PTT: 64.1       [11-09-22 @ 18:32]      Iron 13, TIBC 124, %sat 11      [09-29-22 @ 06:45]  Ferritin 1221      [09-30-22 @ 11:55]  PTH -- (Ca 8.4)      [09-29-22 @ 06:45]   183  PTH -- (Ca 8.6)      [09-22-22 @ 06:15]   178  PTH -- (Ca 8.1)      [09-15-22 @ 06:28]   202  PTH -- (Ca 9.2)      [02-05-22 @ 10:13]   294  HbA1c 6.0      [02-21-20 @ 08:53]  TSH 4.69      [07-06-22 @ 18:36]  Lipid: chol --, , HDL --, LDL --      [07-27-22 @ 13:00]    
[facility]   MD NINA MASON MD KRISTINE SOLTANPOUR, WALDO PALMA    TEL:  OFFICE: [phone]  From 5pm-7am Answering Service [phone]    -- RENAL FOLLOW UP NOTE ---Date of Service 11-10-22 @ 09:41    Patient is a 67y old  Male who presents with a chief complaint of Anemia (09 Nov 2022 21:00)      Patient seen and examined at bedside. No chest pain/sob    VITALS:  T(F): 98 (11-10-22 @ 05:31), Max: 98.7 (11-09-22 @ 15:17)  HR: 66 (11-10-22 @ 05:31)  BP: 134/60 (11-10-22 @ 05:31)  RR: 17 (11-10-22 @ 05:31)  SpO2: 100% (11-10-22 @ 05:31)  Wt(kg): --    11-09 @ 07:01  -  11-10 @ 07:00  --------------------------------------------------------  IN: 700 mL / OUT: 2600 mL / NET: -1900 mL      Height (cm): 177.8 (11-09 @ 15:17)  Weight (kg): 64.7 (11-10 @ 01:07)  BMI (kg/m2): 20.5 (11-10 @ 01:07)  BSA (m2): 1.81 (11-10 @ 01:07)    PHYSICAL EXAM:  General: NAD  Neck: No JVD  Respiratory: CTAB, no wheezes, rales or rhonchi  Cardiovascular: S1, S2, RRR  Gastrointestinal: BS+, soft, NT/ND  Extremities: No peripheral edema    Hospital Medications:   MEDICATIONS  (STANDING):  artificial tears (preservative free) Ophthalmic Solution 1 Drop(s) Both EYES four times a day  brivaracetam 50 milliGRAM(s) Oral every 12 hours  brivaracetam 50 milliGRAM(s) Oral <User Schedule>  carvedilol 12.5 milliGRAM(s) Oral every 12 hours  chlorhexidine 2% Cloths 1 Application(s) Topical daily  dextrose 5%. 1000 milliLiter(s) (100 mL/Hr) IV Continuous <Continuous>  dextrose 5%. 1000 milliLiter(s) (50 mL/Hr) IV Continuous <Continuous>  dextrose 50% Injectable 25 Gram(s) IV Push once  dextrose 50% Injectable 12.5 Gram(s) IV Push once  dextrose 50% Injectable 25 Gram(s) IV Push once  doxazosin 4 milliGRAM(s) Oral <User Schedule>  epoetin cortney-epbx (RETACRIT) Injectable 67634 Unit(s) IV Push <User Schedule>  finasteride 5 milliGRAM(s) Oral daily  glucagon  Injectable 1 milliGRAM(s) IntraMuscular once  influenza  Vaccine (HIGH DOSE) 0.7 milliLiter(s) IntraMuscular once  insulin glargine Injectable (LANTUS) 5 Unit(s) SubCutaneous at bedtime  insulin lispro (ADMELOG) corrective regimen sliding scale   SubCutaneous three times a day before meals  levothyroxine 50 MICROGram(s) Oral daily  multivitamin 1 Tablet(s) Oral daily  pantoprazole    Tablet 40 milliGRAM(s) Oral before breakfast  phenytoin   Capsule 200 milliGRAM(s) Oral two times a day  sevelamer carbonate 800 milliGRAM(s) Oral three times a day  sirolimus 7 milliGRAM(s) Oral daily  valGANciclovir 450 milliGRAM(s) Oral <User Schedule>      LABS:  11-10    137  |  97<L>  |  35<H>  ----------------------------<  162<H>  3.7   |  26  |  2.44<H>    Ca    8.7      10 Nov 2022 06:05  Phos  2.6     11-10  Mg     1.80     11-10    TPro  7.0  /  Alb  2.7<L>  /  TBili  0.3  /  DBili      /  AST  19  /  ALT  15  /  AlkPhos  298<H>  11-09    Creatinine Trend: 2.44 <--, 3.63 <--    Phosphorus Level, Serum: 2.6 mg/dL (11-10 @ 06:05)  Albumin, Serum: 2.7 g/dL (11-09 @ 18:32)                              8.1    3.57  )-----------( 254      ( 10 Nov 2022 06:05 )             27.4     Urine Studies:      Iron 13, TIBC 124, %sat 11      [09-29-22 @ 06:45]  Ferritin 1221      [09-30-22 @ 11:55]  PTH -- (Ca 8.4)      [09-29-22 @ 06:45]   183  PTH -- (Ca 8.6)      [09-22-22 @ 06:15]   178  PTH -- (Ca 8.1)      [09-15-22 @ 06:28]   202  PTH -- (Ca 9.2)      [02-05-22 @ 10:13]   294  HbA1c 6.0      [02-21-20 @ 08:53]  TSH 4.69      [07-06-22 @ 18:36]  Lipid: chol --, , HDL --, LDL --      [07-27-22 @ 13:00]        RADIOLOGY & ADDITIONAL STUDIES:

## 2022-11-14 PROBLEM — G62.9 PERIPHERAL POLYNEUROPATHY: Status: ACTIVE | Noted: 2022-01-01

## 2022-11-14 PROBLEM — G40.901 STATUS EPILEPTICUS: Status: ACTIVE | Noted: 2022-01-01

## 2022-11-14 NOTE — PHYSICAL EXAM
[FreeTextEntry1] : General: thin, in W/C\par Constitutional: alert and in no acute distress. \par Psychiatric: affect normal, insight and judgment intact.\par Neurologic: \par Orientation: oriented to person, place. \par Attention: normal concentrating ability and attention was not decreased. \par Language: understands, follows simple commands. \par Cranial Nerves: visual acuity intact bilaterally, visual fields full to confrontation grossly ?RUQ decreased, pupils equal round and reactive to light, extraocular motion intact, facial sensation intact symmetrically, face symmetrical, hearing was intact bilaterally, tongue and palate midline, head turning and shoulder shrug symmetric and there was no tongue deviation with protrusion. \par Motor: decreased bulk, tone incr on right.  AG all ext\par Coordination:. in w/c.  \par Deep tendon reflexes: \par Biceps right 0+. Biceps left 0+.  \par Triceps right 0+. Triceps left 0+.  \par Brachioradialis right 0+. Brachioradialis left 0+.  \par Patella right 0+. Patella left 0+.  \par Ankle jerk right 0+. Ankle jerk left 0+. \par Eyes: the sclera and conjunctiva were normal. \par Neck: the appearance of the neck was normal. \par Musculoskeletal: no clubbing or cyanosis of the fingernails. \par Skin: no lesions, rash\par

## 2022-11-14 NOTE — HISTORY OF PRESENT ILLNESS
[FreeTextEntry1] : Rx:  BRV 50mg bid, PHT 200mg BID\par \par 2019 had new onset seizures p L MCA CVA\par was on LEV and LAC, with LAC eventually tapered.  Was on HD at that time.\par Was seen at [facility] at that time, (Ant Bustos).\par \par (semiology RUE/R face clonus, aware when it is going to occur, responds afterwards)\par At least 1 GTCS in past, now mostly AHRI szs.\par \par Had been ill, seizures, then changed LEV to VPA. \par Had transplant, in p operative period had seizures again.\par Then revision in kidney, on tacrolimus.  Had fevers, UTI, meropenem -> Had NCSE, delirium, fatigue.\par \par Later placed on PHT and BRV since 7/2022 and 8/2022. Last szs 8/2022\par \par Still very week. Was recently dc from [place].\par \par Recent anemia, still on HD due to transplant rejection. known DM related neuropathy.\par \par EEG 8/2022:\par Left post quad sz, with R>L clonus.\par Frequent sharp waves over the left posterior quadrant

## 2022-11-14 NOTE — DISCUSSION/SUMMARY
[Medically Refractory (seizure within the last year)] : Medically Refractory (seizure within the last year) [Complex Partial] : complex partial [Secondary Generalization] : secondary generalization [Focal] : focal [Symptomatic] : symptomatic [Safety Recommendations] : The patient was advised in regards to the risk of seizures and general seizure safety recommendations including not to be bathing alone, climbing to high places and operating heavy machinery. [Compliance with Medications] : The importance of compliance with medications was reinforced. [Medication Side Effects] : High frequency and serious potential medication adverse effects were reviewed with the patient, including but not exclusive to psychiatric effects.  Information sheets on medication side effects were made available to the patient in our clinic.  The patient or advocate agrees to notify us for any concerns. [Risk of Death] : Risk of death associated with seizures / SUDEP was discussed. [FreeTextEntry1] : \par 67 M w/ CVA, p stroke epilepsy, focal L post quad onset seizures.\par R paresis\par Deconditioned\par Renal issues, uremia.\par Afib\par Anemia\par \par \par F/u PHT level with low albumin\par PHT free and total levels.  Levels may be unpredictable due to:\par low albumin, larger unbound component, dialysis of free portion, relatively high dose.\par \par BRV does not need dose adjustment typically p HD, ~10% dialysed out\par Rec reduced dose of BRV p HD to 25mg for now\par \par f/u AEEG x 24, levels\par prior to any further dose adjustment\par \par RTC 3-4mo\par x61min + documentation

## 2022-11-16 NOTE — PROGRESS NOTE ADULT - NUTRITIONAL ASSESSMENT
This patient has been assessed with a concern for Malnutrition and has been determined to have a diagnosis/diagnoses of Severe protein-calorie malnutrition.    This patient is being managed with:   Diet Regular-  Consistent Carbohydrate {Evening Snack} (CSTCHOSN)  Soft and Bite Sized (SOFTBTSZ)  Mildly Thick Liquids (MILDTHICKLIQS)  Supplement Feeding Modality:  Oral  Ensure Pudding Cans or Servings Per Day:  1       Frequency:  Three Times a day  Entered: Raffi 15 2022  8:09AM     Double Island Pedicle Flap Text: The defect edges were debeveled with a #15 scalpel blade.  Given the location of the defect, shape of the defect and the proximity to free margins a double island pedicle advancement flap was deemed most appropriate.  Using a sterile surgical marker, an appropriate advancement flap was drawn incorporating the defect, outlining the appropriate donor tissue and placing the expected incisions within the relaxed skin tension lines where possible.    The area thus outlined was incised deep to adipose tissue with a #15 scalpel blade.  The skin margins were undermined to an appropriate distance in all directions around the primary defect and laterally outward around the island pedicle utilizing iris scissors.  There was minimal undermining beneath the pedicle flap.

## 2022-11-18 PROBLEM — N18.6 ESRD ON DIALYSIS: Status: ACTIVE | Noted: 2019-10-30

## 2022-11-18 PROBLEM — G40.209 COMPLEX PARTIAL EPILEPSY WITH RECURRENT SEIZURES: Status: ACTIVE | Noted: 2022-01-01

## 2022-11-18 NOTE — ED PROVIDER NOTE - CLINICAL SUMMARY MEDICAL DECISION MAKING FREE TEXT BOX
68 yo M w/ kidney transplant (4/21/22) c/b rejection on dialysis (MWF, last 11/16), seizure disorder on Brivaracetam + phenytoin, CVA, and LBBB s/p LIMA presents for low Hgb of 6.2 collected from dialysis center on 11/16. Physical exam is remarkable for course b/l upper lung sounds and b/l 1+ pitting edema. Concern for dilutional anemia due to non-optimized dialysis or volume overload vs. myelosuppression 2/2 parvovirus vs. iron deficiency vs. active bleed. Plan for labs, imaging, blood transfusion work-up. Likely admission for dialysis and additional work-up for anemia etiology.

## 2022-11-18 NOTE — H&P ADULT - NSHPPHYSICALEXAM_GEN_ALL_CORE
Constitutional: Well developed / well nourished +facial edema  Eyes: Anicteric, PERRLA  ENMT: nc/at  Neck: Supple  Respiratory: CTA B/L  Cardiovascular: RRR  Gastrointestinal: Soft abdomen, NT, ND  Genitourinary:  Voiding spontaneously  Extremities: +b/l LE edema and b/l UE edema.   Vascular: Palpable dp pulses bilaterally  Neurological: A&O x3    Musculoskeletal: Moving all extremities  Psychiatric: Responsive Constitutional: Well developed / well nourished +facial edema  Eyes: Anicteric, PERRLA  ENMT: nc/at  Neck: Supple  Respiratory: CTA B/L  Cardiovascular: RRR  Gastrointestinal: Soft abdomen, NT, ND  Genitourinary:  Voiding spontaneously  Extremities: no LE or UE edema.   Vascular: Palpable dp pulses bilaterally  Neurological: A&O x3    Musculoskeletal: Moving all extremities  Psychiatric: Responsive

## 2022-11-18 NOTE — ED ADULT TRIAGE NOTE - CHIEF COMPLAINT QUOTE
low hemoglobin 6.2 pre dialysis, did not receive, dialysis, normally MWF, kidney transplant failure in April

## 2022-11-18 NOTE — ED PROVIDER NOTE - PROGRESS NOTE DETAILS
Attending (Rodolfo Recinos D.O.):  hgb 7.5. Likely diluational in setting fo weight gain. Will hold blood transfusion to occur during dialysis. No indication for emergent dialysis based on labs. Admit. All discussed with family at bedside.

## 2022-11-18 NOTE — H&P ADULT - NSHPLABSRESULTS_GEN_ALL_CORE
7.5    2.48  )-----------( 288      ( 18 Nov 2022 16:11 )             26.9     11-18    139  |  101  |  72<H>  ----------------------------<  162<H>  4.4   |  24  |  3.55<H>    Ca    9.0      18 Nov 2022 16:11  Phos  3.2     11-18  Mg     2.2     11-18    TPro  7.1  /  Alb  2.7<L>  /  TBili  0.3  /  DBili  x   /  AST  19  /  ALT  14  /  AlkPhos  291<H>  11-18

## 2022-11-18 NOTE — ED ADULT NURSE NOTE - OBJECTIVE STATEMENT
67 y m came to the ed for low H&H. states he received dialysis wednesday and received a phone call today saying he was anemic and should report to the ED for further evaluation. patient says he has no complaints except for a headache. denies fevers, chills, chest pain, sob. skin is warm and dry. patient says often he develops fluid overload and has recently experienced a 20lb weight gain.

## 2022-11-18 NOTE — ED PROVIDER NOTE - OBJECTIVE STATEMENT
66 yo M w/ kidney transplant (4/21/22) c/b rejection on dialysis (MWF, last 11/16), seizure disorder on Brivaracetam + phenytoin, and LBBB s/p LIMA presents for low Hgb of 6.2 collected from dialysis center on 11/16. He 68 yo M w/ kidney transplant (4/21/22) c/b rejection on dialysis (MWF, last 11/16), seizure disorder on Brivaracetam + phenytoin, CVA, and LBBB s/p LIMA presents for low Hgb of 6.2 collected from dialysis center on 11/16. He recently received a blood transfusion, iron supplementation, and was taken off mycophenolate (concern of myelosuppression) at [facility] last week after a Hgb of 6.9 which was found to be 7.5 in the ED. He had a EGD and colonoscopy at [facility] 1 month ago and was only found to have internal hemorrhoids. He has recently been receiving iron supplementations and aranesp. He was previously admitted to the hospital for 6 months after transplant and a stroke for rehabilitation which improved R sided weakness,. He recently started rehab again. HPI provided by sons as patient is somnolent at baseline. Patient only complains of headache.

## 2022-11-18 NOTE — PATIENT PROFILE ADULT - FALL HARM RISK - HARM RISK INTERVENTIONS
Assistance with ambulation/Assistance OOB with selected safe patient handling equipment/Communicate Risk of Fall with Harm to all staff/Discuss with provider need for PT consult/Monitor gait and stability/Reinforce activity limits and safety measures with patient and family/Tailored Fall Risk Interventions/Visual Cue: Yellow wristband and red socks/Bed in lowest position, wheels locked, appropriate side rails in place/Call bell, personal items and telephone in reach/Instruct patient to call for assistance before getting out of bed or chair/Non-slip footwear when patient is out of bed/[place] to call system/Physically safe environment - no spills, clutter or unnecessary equipment/Purposeful Proactive Rounding/Room/bathroom lighting operational, light cord in reach

## 2022-11-18 NOTE — H&P ADULT - HISTORY OF PRESENT ILLNESS
66M with PMH: DM type II, HTN, CAD s/p CABG in 2020, AFib on Eliquis, CVA in 2019 due to Afib, Seizure d/o following CVA, last episode was on 4/8/22, h/o GIB in 2/2022 EGD with duodenal ulcer.  ESRD due to DM was on HD since 2019.     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  Donor was 58 , KDPI 82%, DCD, single vessels and ureter, HLA mismatch 1, 2, 2. No DSA, cPRA 0%. 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     -Re-admitted in May for anemia in the setting of large perinephric hematoma s/p evacuation and repair of arterial anastomosis on 5/2/22. Intra operative biopsy showed no rejection, Creatinine ranging ~2mg/dL, sent to Rehab. In Rehab, had Klebsiella UTI, was treated w/ ertapenem ---> levaquin.  -Readmitted June for seizure status epilepticus. Intubated, s/p Thoracentesis 1.3L. S/p VATS on 6/13 with Thoracic, 2.2L old blood removed; second chest tube placed, later removed.  ?PRES vs. chronic ischemic changes on MRI, Was taken off Envarsus, switched to Belatacept 6/23. Found to have R IJ DVT on 6/24, switched to lovenox. Had refactory seizures during this admission that improved w/ IV antiepileptic regimen. Ureteral stent was removed 7/11. Found to have ESBL Kleb UTI  w/ fevers, completed course w/ erta/fluc. HD restarted 7/29. S/p IR biospy w/ 2A rejection, s/p pulse steriods. Was discharged to rehab on oral seizure meds, coumadin.    -In Rehab: Stronglyloides Ab + 9/20, s/p 1 dose of Ivermectin. Anemic w/ +FOBT. S/p colonscopy 10/3 that showed gastritis and small hemorrhoids. Discharged from rehab on 10/5.     -Admitted at [facility] for 1 day moy 11/9 w/ Hb of 6.9. Repeat Hb was 7.5, and he was discharged home.     He was due for HD today and pre-HD Hb was 6.5, therefore he was sent to the ED. In the ED, Hb was 7.5 (was 8.1 on 11/10 and 7.5 on 11/9). He was noted to have generalized edema, in face, UE, LE. Per son, he did not get HD today since Hb was low on outpatient labs.    Patient currently denies SOB, CP, dyspnea, HA dizziness, N/V/D/C, fevers/chills.      67M with PMH: DM type II, HTN, CAD s/p CABG in 2020, AFib on Eliquis, CVA in 2019 due to Afib, Seizure d/o following CVA, last episode was on 4/8/22, h/o GIB in 2/2022 EGD with duodenal ulcer.  ESRD due to DM was on HD since 2019.     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  Donor was 58 , KDPI 82%, DCD, single vessels and ureter, HLA mismatch 1, 2, 2. No DSA, cPRA 0%. 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     -Re-admitted in May for anemia in the setting of large perinephric hematoma s/p evacuation and repair of arterial anastomosis on 5/2/22. Intra operative biopsy showed no rejection, Creatinine ranging ~2mg/dL, sent to Rehab. In Rehab, had Klebsiella UTI, was treated w/ ertapenem ---> levaquin.    -Readmitted June for seizure status epilepticus. Intubated, s/p Thoracentesis 1.3L. S/p VATS on 6/13 with Thoracic, 2 chest tubes were placed, later removed.  ?PRES vs. chronic ischemic changes on MRI, Was taken off Envarsus, switched to Belatacept 6/23. Found to have R IJ DVT on 6/24, switched to lovenox. Had refectory seizures during this admission that improved w/ IV antiepileptic regimen. Ureteral stent was removed 7/11. Found to have ESBL Kleb UTI  w/ fevers, completed course w/ erta/fluc. HD restarted 7/29 do to poor graft function. S/p IR biospy w/ 2A rejection, s/p pulse steriods. Was discharged to rehab on oral seizure meds, coumadin.    -In Rehab: Stronglyloides Ab + 9/20, s/p 1 dose of Ivermectin. Anemic w/ +FOBT. S/p colonscopy 10/3 that showed gastritis and small hemorrhoids. Discharged from rehab on 10/5.   -Outpatient: MMF was discontinued, is currently on Sirolimus/Pred and on HD.     -Admitted at [facility] for 1 day moy 11/9 w/ Hb of 6.9. Repeat Hb was 7.5, and he was discharged home.     He was due for HD today and pre-HD Hb was 6.5, therefore he was sent to the ED. In the ED, Hb was 7.5 (was 8.1 on 11/10 and 7.5 on 11/9). He was noted to have generalized edema, in face, UE, LE. Per son, he did not get HD today since Hb was low on outpatient labs.    Patient currently denies SOB, CP, dyspnea, HA dizziness, N/V/D/C, fevers/chills.      67M with PMH: DM type II, HTN, CAD s/p CABG in 2020, AFib on Eliquis, CVA in 2019 due to Afib, Seizure d/o following CVA, last episode was on 4/8/22, h/o GIB in 2/2022 EGD with duodenal ulcer.  ESRD due to DM was on HD since 2019.     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  Donor was 58 , KDPI 82%, DCD, single vessels and ureter, HLA mismatch 1, 2, 2. No DSA, cPRA 0%. 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     -Re-admitted in May for anemia in the setting of large perinephric hematoma s/p evacuation and repair of arterial anastomosis on 5/2/22. Intra operative biopsy showed no rejection, Creatinine ranging ~2mg/dL, sent to Rehab. In Rehab, had Klebsiella UTI, was treated w/ ertapenem ---> levaquin.    -Readmitted June for seizure status epilepticus. Intubated, s/p Thoracentesis 1.3L. S/p VATS on 6/13 with Thoracic, 2 chest tubes were placed, later removed.  ?PRES vs. chronic ischemic changes on MRI, Was taken off Envarsus, switched to Belatacept 6/23. Found to have R IJ DVT on 6/24, switched to lovenox. Had refectory seizures during this admission that improved w/ IV antiepileptic regimen. Ureteral stent was removed 7/11. Found to have ESBL Kleb UTI  w/ fevers, completed course w/ erta/fluc. HD restarted 7/29 do to poor graft function. S/p IR biospy w/ 2A rejection, s/p pulse steriods. Was discharged to rehab on oral seizure meds, coumadin.    -In Rehab: Stronglyloides Ab + 9/20, s/p 1 dose of Ivermectin. Anemic w/ +FOBT. S/p colonscopy 10/3 that showed gastritis and small hemorrhoids. Discharged from rehab on 10/5.   -Outpatient: MMF was discontinued, is currently on Sirolimus/Pred and on HD.     -Admitted at [facility] for 1 day moy 11/9 w/ Hb of 6.9. Repeat Hb was 7.5, and he was discharged home.     He was due for HD today and pre-HD Hb was 6.5, therefore he was sent to the ED. In the ED, Hb was 7.5 (was 8.1 on 11/10 and 7.5 on 11/9). He was noted to have generalized edema. Per son, he did not get HD today since Hb was low on outpatient labs.    Patient currently denies SOB, CP, dyspnea, HA dizziness, N/V/D/C, fevers/chills.

## 2022-11-18 NOTE — ED PROVIDER NOTE - NS ED ROS FT
GENERAL: No fever  HEENT: No trouble swallowing or speaking  CARDIAC: No chest pain  PULMONARY: No cough or SOB  GI: No abdominal pain, no nausea or no vomiting, no diarrhea or constipation  : No changes in urination  SKIN: No rashes  NEURO: headache  MSK: No joint pain  Otherwise as HPI or negative.  Limited due to AMS

## 2022-11-18 NOTE — ED PROVIDER NOTE - ATTENDING CONTRIBUTION TO CARE
Attending (Rodolfo Recinos D.O.):  I have personally seen and examined this patient. I have performed a substantive portion of the visit including all aspects of the medical decision making. Resident, fellow, student, and/or ACP note reviewed. I agree on the plan of care except where noted.    67M hx of  donor renal tranplant s/p failed, now on HD MWF via Left chest wall access, seizure disorder on Brivaracetam + phenytoin, CVA, and LBBB s/p LIMA here for low Hgb of 6.2 predialysis. Similar occurred recently, seen at [facility], transfused, dialyzed, discharged. Patient with approx 20 lb weight gain over past few weeks with only 1.5-2L taken off with dialysis (normal 2.6L). Hemodynamically stable here, with LE pitting edema, firm abdomen, rales at lung bases. Conjunctivae appear pale. No tachypnea. No external signs of bleeding. Presume patient with dilutional anemia from inadequate volume taken off. no history to suggest GI bleed. Will eval for degree of anemia here vs look for correctable metabolic derrangement given no dialysis today. Check labs, vbg, maintain T&S. If anemic, plan to transufse with dialysis when admitted for hemodynamics and volume status.

## 2022-11-18 NOTE — H&P ADULT - ASSESSMENT
66M with PMH: DM type II, HTN, CAD s/p CABG in 2020, AFib on Eliquis, CVA in 2019 due to Afib, Seizure d/o following CVA, last episode was on 4/8/22, h/o GIB in 2/2022 EGD with duodenal ulcer.  ESRD due to DM was on HD since 2019.   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  Donor was 58 , KDPI 82%, DCD, single vessels and ureter, HLA mismatch 1, 2, 2. No DSA, cPRA 0%. CMV +/+. Was admitted for for seizure status epilepticusin 05/2022, now stable of PO meds, now sent to ED after pre-HD Hb was 6.5. Hb in ED was 7.5, however was noted to have generalized edema. Pt being admitted w/ generalized edema and HD.     Hb 7.5 (stable compared to last). CXR w/ pulmonary edema. P BNP 33K.       -Admit to Transplant Surgery  -HD tonight  -IS: continue home Sirolimus 7, Pred 10  -Daily Sirolimus levels and AM labs  -cont home MWF valcyte, bactrim, protonix  -cont home lasix 80 QD  -cont home Briviact 50 BID and 50/75 on HD days (75mg to be dosed after HD). Cont home Dilantin 200mg BID.   -DM: Cont Lantus 3U and ISS  -Low carb diet  -Will hold Warfarin 5mg for tonight, will restart if AM H/H remains stable.   -SCDs  -q4 hr vitals   67M with PMH: DM type II, HTN, CAD s/p CABG in 2020, AFib on Eliquis, CVA in 2019 due to Afib, Seizure d/o following CVA, last episode was on 4/8/22, h/o GIB in 2/2022 EGD with duodenal ulcer.  ESRD due to DM was on HD since 2019.   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  Donor was 58 , KDPI 82%, DCD, single vessels and ureter, HLA mismatch 1, 2, 2. No DSA, cPRA 0%. CMV +/+. Was admitted for for seizure status epilepticusin 05/2022, now stable of PO meds, was restarted back on HD during last admission due to poor graft function, now sent to ED after pre-HD Hb was 6.5. Hb in ED was 7.5, however was noted to have generalized edema. Pt being admitted w/ generalized edema and HD.     Hb 7.5 (stable compared to last). CXR w/ pulmonary edema. P BNP 33K.       -Admit to Transplant Surgery  -HD tonight  -IS: continue home Sirolimus 7, Pred 10  -Daily Sirolimus levels and AM labs  -cont home MWF valcyte, bactrim, protonix  -cont home lasix 80 QD  -cont home Briviact 50 BID and 50/75 on HD days (75mg to be dosed after HD). Cont home Dilantin 200mg BID.   -DM: Cont Lantus 3U and ISS  -Low carb diet  -Will hold Warfarin 5mg for tonight, will restart if AM H/H remains stable.   -SCDs  -q4 hr vitals

## 2022-11-18 NOTE — ED ADULT NURSE NOTE - NSIMPLEMENTINTERV_GEN_ALL_ED
Implemented All Fall with Harm Risk Interventions:  [place] to call system. Call bell, personal items and telephone within reach. Instruct patient to call for assistance. Room bathroom lighting operational. Non-slip footwear when patient is off stretcher. Physically safe environment: no spills, clutter or unnecessary equipment. Stretcher in lowest position, wheels locked, appropriate side rails in place. Provide visual cue, wrist band, yellow gown, etc. Monitor gait and stability. Monitor for mental status changes and reorient to person, place, and time. Review medications for side effects contributing to fall risk. Reinforce activity limits and safety measures with patient and family. Provide visual clues: red socks.

## 2022-11-18 NOTE — ED ADULT NURSE REASSESSMENT NOTE - NS ED NURSE REASSESS COMMENT FT1
Pt received Dilantin as per MD order, awaiting pharmacy for Briviact for administration as per MD order. Pt tolerated PO.

## 2022-11-19 NOTE — CONSULT NOTE ADULT - ASSESSMENT
66M with DM2, HTN, CAD with stents/CABG, AF on Coumadin, hx CVA, seizure, PUD, s/p failed renal transplant now back on HD, recent hospitalization for seizures requiring intubation and course complicated by UTI, as well as need for VATS due to large hemothorax, sent by [facility] for low hb. HD MWF, last HD was on 11/16/2022 per family recent GI work up about 1 month ago.    ESRD on HD MWF  from [facility]  last hd 11/16  HD today with transfusion  consent in HD unit from son  monitor    Anemia  recent GI work up  transfuse to keep hb>8  Aranesp with hd  monitor    HTN  Optimal    CKD-MBD  check pth phos   monitor calcium and phos    
67 yr old man with h/o DM, HTN, CAD s/p CABG, Afib, CVA in 2019, Seizure d/o s/p DDRT from DCD donor on 4/21/22.   Complicated post transplant course - initial DGF eventually recovered. Klebsiella UTI/Sepsis rx with antibiotics, status epilepticus, Envarsus discontinued for neurotoxicity/seizures and he was started on belatacept -> complicated by ANA due to rejection Grade 2a treated with pulse steroids, Currently HD dependent.   Admitted with anemia Hgb 6.5 as outpatient, 7.1 on repeat here. No significant symptoms.     S/p DDRT from DCD donor on 4/21/22    Poor graft function due to Grade 2a rejection (biopsy 7/29) , c4d negative, No DSA.    S/p pulse solumedrol completed 8/3. Thymo not given due to frailty    Remains dialysis dependent.    HD today. UF 2 liters as tolerated. Continue lasix 80mg po daily     Immunosuppression   - MMF on hold due to recurrent infections and cytopenia. Currently on sirolimus and prednisone   - Continue sirolimus 7mg daily   - Continue prednisone 10mg po daily     Infection prophylaxis -Bactrim, Valcyte     Anemia multifactorial - transfuse 1 unit PRBC today, getting epo with HD. Ferritin 1600 and Ts[place] 18%  Leukopenia - give Neupogen 300mcg sq x1   Check CMV and parvovirus PCR     Hypertension - Coreg 6.25mg po bid, Amlodipine 10, Doxazosin 4mg   Diabetes - Continue Lantus 3 and Admelog pre meal and sliding scale   Seizure d/o - on Briviact and phenytoin. Dose Briviact after HD   A.fib - continue coumadin 5mg po qhs.     D/c home post HD and blood transfusion if remains stable.       {[id number]}

## 2022-11-19 NOTE — CONSULT NOTE ADULT - SUBJECTIVE AND OBJECTIVE BOX
[facility] DIVISION OF KIDNEY DISEASES AND HYPERTENSION -- INITIAL CONSULT NOTE  --------------------------------------------------------------------------------  Authored by: Ozzie Nova   Cell # [phone]     HPI:  67M with PMH: DM type II, HTN, CAD s/p CABG in , AFib on Eliquis, CVA in 2019 due to Afib, Seizure d/o following CVA, h/o GIB in 2022 EGD with duodenal ulcer.  ESRD due to DM was on HD since . Underwent DCD DDRT on 22 under Thymoglobulin induction from 58 yr old donor  , KDPI 82%, single vessels and ureter, HLA mismatch 1, 2, 2. No DSA, cPRA 0%. CMV +/+  Course complicated by DGF that initially required dialysis but graft function had recovered. Multiple readmissions in the past 6 months including admission in May for large perinephric hematoma  s/p evacuation and repair of arterial anastomosis on 22. 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 He was initiated on dialysis 22 and remains on dialysis.     He is now admitted for anemia Hgb 6.5, on repeat 7.1 today. Also has generalized edema per son worse in the face.   No fever, NVD. Has intermittent cough. Getting PT, functional status improving but remains weak. No seizure activity. No bleeding from any site.   Cellcept discontinued by Dr. Alfred last week. He is on Sirolimus and prednisone.       PAST HISTORY  --------------------------------------------------------------------------------  PAST MEDICAL & SURGICAL HISTORY:  Hypertension  Diabetes  Dyslipidemia  CAD (Coronary Artery Disease)with Stents in 2009 and 2019, s/p off-pump C3L on 20  Hypothyroidism  CVA (cerebral vascular accident)19 with residual bilateral weakness  Anemia  PEG (percutaneous endoscopic gastrostomy) removed 2020  Intubation of airway performed without difficulty Dec 2019  CVA c/b status epilepticus requiring intubation and PEG in 2019-  ESRD on dialysis M/W/F  Seizures after CVA      FAMILY HISTORY:  Family history of cancer of tongue (Father)  Parents are  . Father - at 80 years, tongue cancer was a smoker. Mother- at 71, had accident while crossing road. Siblings- 2 brothers and one sister.    Social History: Lives with family.     ALLERGIES & MEDICATIONS  --------------------------------------------------------------------------------  Allergies  No Known Allergies    Standing Inpatient Medications  amLODIPine   Tablet 10 milliGRAM(s) Oral daily  atorvastatin 40 milliGRAM(s) Oral at bedtime  brivaracetam 50 milliGRAM(s) Oral <User Schedule>  brivaracetam 25 milliGRAM(s) Oral once  carvedilol 6.25 milliGRAM(s) Oral every 12 hours  doxazosin 4 milliGRAM(s) Oral at bedtime  finasteride 5 milliGRAM(s) Oral daily  furosemide    Tablet 80 milliGRAM(s) Oral daily  insulin glargine Injectable (LANTUS) 3 Unit(s) SubCutaneous at bedtime  insulin lispro (ADMELOG) corrective regimen sliding scale   SubCutaneous three times a day before meals  iron sucrose Injectable 100 milliGRAM(s) IV Push once  levothyroxine 50 MICROGram(s) Oral daily  multivitamin 1 Tablet(s) Oral daily  pantoprazole    Tablet 40 milliGRAM(s) Oral before breakfast  phenytoin   Capsule 200 milliGRAM(s) Oral two times a day  predniSONE   Tablet 10 milliGRAM(s) Oral daily  sevelamer carbonate 800 milliGRAM(s) Oral three times a day  sirolimus 7 milliGRAM(s) Oral <User Schedule>  trimethoprim   80 mG/sulfamethoxazole 400 mG 1 Tablet(s) Oral daily  valGANciclovir 450 milliGRAM(s) Oral <User Schedule>    PRN Inpatient Medications  acetaminophen     Tablet .. 650 milliGRAM(s) Oral every 6 hours PRN  dextrose Oral Gel 15 Gram(s) Oral once PRN      REVIEW OF SYSTEMS: Per HPI     VITALS/PHYSICAL EXAM  --------------------------------------------------------------------------------  T(C): 36.6 (22:00), Max: 36.6 (22 23:05)  HR: 67 (22:) (52 - 67)  BP: 155/69 (22:00) (107/50 - 162/71)  RR: 20 (22:00) (15 - 20)  SpO2: 96% (22:) (94% - 100%)  Height (cm): 177.8 (22 23:05)  Weight (kg): 66 (22 23:05)  BMI (kg/m2): 20.9 (22 23:05)  BSA (m2): 1.82 (22 23:05)      22 @ 07:01  -  22 @ 07:00  --------------------------------------------------------  IN: 360 mL / OUT: 0 mL / NET: 360 mL    22 @ 07:01  -  22 @ 12:26  --------------------------------------------------------  IN: 240 mL / OUT: 0 mL / NET: 240 mL      Physical Exam:  Awake, comfortably sitting in chair  Facial edema   Left IJ tunneled catheter  Lungs clear   Abdomen soft and non tender  No edema     LABS/STUDIES  --------------------------------------------------------------------------------              7.1    1.72  >-----------<  248      [22 06:20]              25.0     135  |  97  |  44  ----------------------------<  206      [22 06:23]  4.5   |  24  |  2.39        Ca     8.6     [22:23]      Mg     1.9     [22 06:23]      Phos  3.0     [22 06:23]    TPro  6.7  /  Alb  2.5  /  TBili  0.2  /  DBili  x   /  AST  25  /  ALT  15  /  AlkPhos  273  [22 06:23]    PT/INR: PT 19.1 , INR 1.65       [22 06:20]  PTT: 39.0       [22 06:20]      Creatinine Trend:  SCr 2.39 [:23]  SCr 3.55 [ 16:11]  SCr 2.44 [11-10 @ 06:05]  SCr 3.63 [ 18:32]    Urinalysis - [22 @ 18:49]      Color Yellow / Appearance Slightly Turbid / SG 1.018 / pH 8.5      Gluc 200 mg/dL / Ketone Negative  / Bili Negative / Urobili Negative       Blood Large / Protein >600 / Leuk Est Moderate / Nitrite Positive      RBC 13 / WBC 40 / Hyaline 7 / Gran  / Sq Epi  / Non Sq Epi 4 / Bacteria Moderate      Iron 20, TIBC 112, %sat 18      [11-10-22 @ 06:05]  Ferritin 1651      [11-10-22 @ 06:05]  PTH -- (Ca 8.4)      [22 @ 06:45]   183  HbA1c 6.0      [20 @ 08:53]  TSH 4.69      [22 @ 18:36]    HBsAg Nonreact      [22 @ 06:32]  HCV 0.19, Nonreact      [22 @ 06:32]      CMV PCRCMVPCR Log: NotDetec Ewa84UO/mL ( @ 06:45)  Parvo PCRParvovirus B19 DNA by PCR: NotDetec IU/mL ( @ 18:32)    
[facility]   MD NINA MASON MD KRISTINE SOLTANPOUR, WALDO PALMA        TEL:  OFFICE: [phone]  From 5pm-7am answering service [phone]    --- INITIAL RENAL CONSULT NOTE ---date of service 22 @ 17:37    HPI:   Mr. Castelan is an 67 gentleman with DM2, HTN, CAD with stents/CABG, AF on Coumadin, hx CVA, seizure, PUD, s/p failed DCD KTX transplant now back on HD and ESRD on hemodialysis Monday, Wednesday and Friday, recent hospitalization for seizures requiring intubation and course complicated by UTI, as well as need for VATS due to large hemothorax, sent by [facility] for low hemoglobin of 6.2. HD MWF had extra treatment yesterday due to fluid overload. Per family recent GI work up about 1 month ago, also noted to have elevated INR on coumadin. Nephrology consulted for dialysis needs.         Allergies:  No Known Allergies      PAST MEDICAL & SURGICAL HISTORY:  Hypertension  Diabetes  Dyslipidemia  CAD (Coronary Artery Disease) with Stents in 2009 and 2019, s/p off-pump C3L on 20  Hypothyroidism  CVA (cerebral vascular accident)19 with residual bilateral weakness  Anemia  PEG (percutaneous endoscopic gastrostomy) statusremoved 2020  Intubation of airway performed without difficulty  Dec 2019  CVA c/b status epilepticus requiring intubation and PEG in 2019-  ESRD on dialysis  M/W/F  Seizures after CVA, last seizure was last week 22  History of insertion of stent into coronary artery bypass graft  and   S/P CABG x 3 off pump C3L on 20        Home Medications:  epoetin cortney: 21735 unit(s) intravenous 3 times a week  with HD (08 Sep 2022 14:04)  Multiple Vitamins oral tablet: 1 tab(s) orally once a day (27 Sep 2022 13:33)  ocular lubricant ophthalmic solution: 2 drop(s) to each affected eye every 6 hours, As Needed (27 Sep 2022 13:33)  senna leaf extract oral tablet: 2 tab(s) orally once a day (at bedtime), As Needed (27 Sep 2022 13:33)    Home Medications Reviewed    Hospital Medications:   MEDICATIONS  (STANDING):  iron sucrose Injectable 100 milliGRAM(s) IV Push Once      SOCIAL HISTORY:  Denies ETOh, Smoking,     FAMILY HISTORY:  Family history of cancer of tongue (Father)  Parents are  . Father - at 80 years, tongue cancer was a smoker. Mother- at 71, had accident while crossing road. Siblings- 2 brothers and one sister.        REVIEW OF SYSTEMS:  CONSTITUTIONAL: No weakness, fevers or chills  EYES/ENT: No visual changes;  No vertigo or throat pain   NECK: No pain or stiffness  RESPIRATORY: No cough, wheezing, hemoptysis; No shortness of breath  CARDIOVASCULAR: No chest pain or palpitations.  GASTROINTESTINAL: No abdominal or epigastric pain. No nausea, vomiting, or hematemesis; No diarrhea or constipation. No melena or hematochezia.  GENITOURINARY: No dysuria, frequency, foamy urine, urinary urgency, incontinence or hematuria  NEUROLOGICAL: No numbness or weakness  SKIN: No itching, burning, rashes, or lesions   VASCULAR: No bilateral lower extremity edema.   All other review of systems is negative unless indicated above.    VITALS:  T(F): 96 (22 @ 14:29), Max: 96 (22 @ 14:29)  HR: 52 (22 @ 17:12)  BP: 110/48 (22 @ 17:12)  RR: 16 (22 @ 17:12)  SpO2: 94% (22 @ 17:12)      Height (cm): 177.8 ( @ 14:29)  Weight (kg): 63.5 ( @ :29)  BMI (kg/m2): 20.1 ( @ :29)  BSA (m2): 1.79 ( @ :29)    PHYSICAL EXAM:  General: NAD  HEENT: anicteric sclera, oropharynx clear, MMM  Neck: No JVD  Respiratory: CTAB, no wheezes, rales or rhonchi  Cardiovascular: S1, S2, RRR  Gastrointestinal: BS+, soft, NT/ND  Extremities: No cyanosis or clubbing. No peripheral edema  Neurological: A/O x 3, no focal deficits  Psychiatric: Normal mood, normal affect  : No CVA tenderness. No tucker.   Skin: No rashes  Access: Left permacath      LABS:      139  |  101  |  72<H>  ----------------------------<  162<H>  4.4   |  24  |  3.55<H>    Ca    9.0      2022 16:11  Phos  3.2       Mg     2.2         TPro  7.1  /  Alb  2.7<L>  /  TBili  0.3  /  DBili      /  AST  19  /  ALT  14  /  AlkPhos  291<H>      Creatinine Trend: 3.55 <--                        7.5    2.48  )-----------( 288      ( 2022 16:11 )             26.9     Urine Studies:        RADIOLOGY & ADDITIONAL STUDIES:

## 2022-11-19 NOTE — DISCHARGE NOTE PROVIDER - CARE PROVIDER_API CALL
Iker Alfred (MD)  Internal Medicine; Nephrology  [address] [place] [address]  Phone: [phone]  Fax: [phone]  Follow Up Time:

## 2022-11-19 NOTE — DISCHARGE NOTE PROVIDER - HOSPITAL COURSE
67 yr old man with h/o DM, HTN, CAD s/p CABG, Afib, CVA in 2019, Seizure d/o s/p DDRT from DCD donor on 4/21/22.   Complicated post transplant course - initial DGF eventually recovered. Klebsiella UTI/Sepsis rx with antibiotics, status epilepticus, Envarsus discontinued for neurotoxicity/seizures and he was started on belatacept -> complicated by ANA due to rejection Grade 2a treated with pulse steroids, Currently HD dependent.   Admitted with anemia Hgb 6.5 as outpatient, 7.1 on repeat here. No significant symptoms.     S/p DDRT from DCD donor on 4/21/22    Poor graft function due to Grade 2a rejection (biopsy 7/29) , c4d negative, No DSA.    S/p pulse solumedrol completed 8/3. Thymo not given due to frailty    Remains dialysis dependent.    HD 11/19 UF 2 liters as tolerated. Continue lasix 80mg po daily     Immunosuppression   - MMF on hold due to recurrent infections and cytopenia. Currently on sirolimus and prednisone   - Continue sirolimus 7mg daily   - Continue prednisone 10mg po daily     Infection prophylaxis -Bactrim, Valcyte     Anemia multifactorial - transfuse 1 unit PRBC today, getting epo with HD. Ferritin 1600 and Tsat 18%  Leukopenia - give Neupogen 300mcg sq x1   Check CMV and parvovirus PCR     Hypertension - Coreg 6.25mg po bid, Amlodipine 10, Doxazosin 4mg   Diabetes - Continue Lantus 3 and Admelog pre meal and sliding scale   Seizure d/o - on Briviact and phenytoin. Dose Briviact after HD   A.fib - continue coumadin 5mg po qhs.     Will f/u with Nephrologist in 1 week

## 2022-11-19 NOTE — DISCHARGE NOTE PROVIDER - NSDCMRMEDTOKEN_GEN_ALL_CORE_FT
Admelog SoloStar 100 units/mL injectable solution: 5 unit(s) subcutaneous 3 times a day (with meals)   amLODIPine 10 mg oral tablet: 1 tab(s) orally once a day  atorvastatin 40 mg oral tablet: 1 tab(s) orally once a day (at bedtime)  brivaracetam 50 mg oral tablet: 1 tab(s) orally 2 times a day (9am, 9pm); BUT on days of dialysis, please take an extra dose after dialysis   MDD:150mg  carvedilol 6.25 mg oral tablet: 1 tab(s) orally every 12 hours  doxazosin 4 mg oral tablet: 1 tab(s) orally 2 times a day   epoetin cortney: 05234 unit(s) intravenous 3 times a week  with HD  finasteride 5 mg oral tablet: 1 tab(s) orally once a day  furosemide 80 mg oral tablet: 1 tab(s) orally once a day  insulin glargine 100 units/mL subcutaneous solution: 5 unit(s) subcutaneous once a day (at bedtime)  levothyroxine 50 mcg (0.05 mg) oral tablet: 1 tab(s) orally once a day  Multiple Vitamins oral tablet: 1 tab(s) orally once a day  ocular lubricant ophthalmic solution: 2 drop(s) to each affected eye every 6 hours, As Needed  pantoprazole 40 mg oral delayed release tablet: 1 tab(s) orally once a day before breakfast  phenytoin 200 mg oral capsule, extended release: 1 cap(s) orally 2 times a day  polyethylene glycol 3350 oral powder for reconstitution: 17 gram(s) orally once a day, As needed, Constipation  predniSONE 10 mg oral tablet: 1 tab(s) orally once a day  ramelteon 8 mg oral tablet: 1 tab(s) orally once a day (at bedtime)  senna leaf extract oral tablet: 2 tab(s) orally once a day (at bedtime), As Needed  sevelamer carbonate 800 mg oral tablet: 1 tab(s) orally 3 times a day (with meals)  sirolimus 1 mg oral tablet: 7 tab(s) orally once a day  sulfamethoxazole-trimethoprim 400 mg-80 mg oral tablet: 1 tab(s) orally once a day  valGANciclovir 450 mg oral tablet: 1 tab(s) orally 3 times a week (mon/wed/fri)  warfarin 5 mg oral tablet: 1 tab(s) orally once a day (at bedtime)

## 2022-11-19 NOTE — PROGRESS NOTE ADULT - SUBJECTIVE AND OBJECTIVE BOX
Transplant Surgery - Multidisciplinary Progress Note  --------------------------------------------------------------  Present:   Patient seen and examined with multidisciplinary Transplant team including  Surgeon: Dr. Davis,   Nephrologist:  Dr. Nova.  and unit RN during am rounds.  Disciplines not in attendance will be notified of the plan.       67 yr old man with h/o DM, HTN, CAD s/p CABG, Afib, CVA in 2019, Seizure d/o s/p DDRT from [facility] donor on 4/21/22.   Complicated post transplant course - initial DGF eventually recovered. Klebsiella UTI/Sepsis rx with antibiotics, status epilepticus, Envarsus discontinued for neurotoxicity/seizures and he was started on belatacept -> complicated by ANA due to rejection Grade 2a treated with pulse steroids, Currently HD dependent.   Admitted with anemia Hgb 6.5 as outpatient, 7.1 on repeat here. No significant symptoms.     Interval Events:  no events overnight  no signs of bleeding      Potential Discharge date: today    Education:  Medications    Plan of care:  See Below    MEDICATIONS  (STANDING):  amLODIPine   Tablet 10 milliGRAM(s) Oral daily  atorvastatin 40 milliGRAM(s) Oral at bedtime  brivaracetam 50 milliGRAM(s) Oral <User Schedule>  carvedilol 6.25 milliGRAM(s) Oral every 12 hours  dextrose 5%. 1000 milliLiter(s) (100 mL/Hr) IV Continuous <Continuous>  dextrose 5%. 1000 milliLiter(s) (50 mL/Hr) IV Continuous <Continuous>  dextrose 50% Injectable 25 Gram(s) IV Push once  dextrose 50% Injectable 12.5 Gram(s) IV Push once  dextrose 50% Injectable 25 Gram(s) IV Push once  doxazosin 4 milliGRAM(s) Oral at bedtime  finasteride 5 milliGRAM(s) Oral daily  furosemide    Tablet 80 milliGRAM(s) Oral daily  glucagon  Injectable 1 milliGRAM(s) IntraMuscular once  influenza  Vaccine (HIGH DOSE) 0.7 milliLiter(s) IntraMuscular once  insulin glargine Injectable (LANTUS) 3 Unit(s) SubCutaneous at bedtime  insulin lispro (ADMELOG) corrective regimen sliding scale   SubCutaneous three times a day before meals  levothyroxine 50 MICROGram(s) Oral daily  multivitamin 1 Tablet(s) Oral daily  pantoprazole    Tablet 40 milliGRAM(s) Oral before breakfast  phenytoin   Capsule 200 milliGRAM(s) Oral two times a day  predniSONE   Tablet 10 milliGRAM(s) Oral daily  sevelamer carbonate 800 milliGRAM(s) Oral three times a day  sirolimus 7 milliGRAM(s) Oral <User Schedule>  trimethoprim   80 mG/sulfamethoxazole 400 mG 1 Tablet(s) Oral daily  valGANciclovir 450 milliGRAM(s) Oral <User Schedule>    MEDICATIONS  (PRN):  acetaminophen     Tablet .. 650 milliGRAM(s) Oral every 6 hours PRN Temp greater or equal to 38C (100.4F), Mild Pain (1 - 3)  dextrose Oral Gel 15 Gram(s) Oral once PRN Blood Glucose LESS THAN 70 milliGRAM(s)/deciliter      PAST MEDICAL & SURGICAL HISTORY:  Hypertension      Diabetes      Dyslipidemia      CAD (Coronary Artery Disease)  with Stents in 06/2009 and 6/2019, s/p off-pump C3L on 8/13/20      Hypothyroidism      CVA (cerebral vascular accident)  12/13/19 with residual bilateral weakness      Anemia      PEG (percutaneous endoscopic gastrostomy) status  removed July 2020      Intubation of airway performed without difficulty  Dec 2019  CVA c/b status epilepticus requiring intubation and PEG in 12/2019-      ESRD on dialysis  M/W/F      Seizures  after CVA, last seizure was last week 4/07/22      History of insertion of stent into coronary artery bypass graft  2009 and 2019      S/P CABG x 3  off pump C3L on 8/13/20          Vital Signs Last 24 Hrs  T(C): 36.2 (19 Nov 2022 13:25), Max: 36.6 (18 Nov 2022 23:05)  T(F): 97.2 (19 Nov 2022 13:25), Max: 97.9 (18 Nov 2022 23:05)  HR: 60 (19 Nov 2022 13:25) (52 - 67)  BP: 139/73 (19 Nov 2022 13:25) (107/50 - 162/71)  BP(mean): --  RR: 18 (19 Nov 2022 13:25) (16 - 20)  SpO2: 97% (19 Nov 2022 13:25) (94% - 98%)    Parameters below as of 19 Nov 2022 13:25  Patient On (Oxygen Delivery Method): room air        I&O's Summary    18 Nov 2022 07:01  -  19 Nov 2022 07:00  --------------------------------------------------------  IN: 360 mL / OUT: 0 mL / NET: 360 mL    19 Nov 2022 07:01  -  19 Nov 2022 15:25  --------------------------------------------------------  IN: 480 mL / OUT: 0 mL / NET: 480 mL                              7.1    1.72  )-----------( 248      ( 19 Nov 2022 06:20 )             25.0     11-19    135  |  97  |  44<H>  ----------------------------<  206<H>  4.5   |  24  |  2.39<H>    Ca    8.6      19 Nov 2022 06:23  Phos  3.0     11-19  Mg     1.9     11-19    TPro  6.7  /  Alb  2.5<L>  /  TBili  0.2  /  DBili  x   /  AST  25  /  ALT  15  /  AlkPhos  273<H>  11-19            Review of systems  Gen: No weight changes, fatigue, fevers/chills, weakness  Skin: No rashes  Head/Eyes/Ears/Mouth: No headache; Normal hearing; Normal vision w/o blurriness; No sinus pain/discomfort, sore throat  Respiratory: No dyspnea, cough, wheezing, hemoptysis  CV: No chest pain, PND, orthopnea  GI: Mild abdominal pain at surgical incision site; denies diarrhea, constipation, nausea, vomiting, melena, hematochezia  : No increased frequency, dysuria, hematuria, nocturia  MSK: No joint pain/swelling; no back pain; no edema  Neuro: No dizziness/lightheadedness, weakness, seizures, numbness, tingling  Heme: No easy bruising or bleeding  Endo: No heat/cold intolerance  Psych: No significant nervousness, anxiety, stress, depression  All other systems were reviewed and are negative, except as noted.      PHYSICAL EXAM:  Constitutional: Well developed / well nourished  Eyes: Anicteric, PERRLA  ENMT: nc/at. facial edema  Neck: supple  Respiratory: CTA B/L  Cardiovascular: RRR  Gastrointestinal: Soft, ND/NT  Genitourinary: Voiding spontaneously  Extremities: SCD's in place and working bilaterally, no LE edema  Vascular: Palpable dp pulses bilaterally  Neurological: A&O x3  Skin: no rashes, ulcerations or lesions;  Musculoskeletal: Moving all extremities  Psychiatric: Responsive    
[facility]   MD NINA MASON MD RUORU WONG, PA    TEL:  FROM 9 AM to 5 PM ---OFFICE: [phone]    FROM 5 PM - 9 AM PLEASE CALL ANSWERING SERVICE: [phone]    RENAL FOLLOW UP NOTE--Date of Service 11-19-22 @ 07:42  --------------------------------------------------------------------------------  HPI:  Pt seen and examined at bedside.       PAST HISTORY  --------------------------------------------------------------------------------  No significant changes to PMH, PSH, FHx, SHx, unless otherwise noted    ALLERGIES & MEDICATIONS  --------------------------------------------------------------------------------  Allergies    No Known Allergies    Intolerances      Standing Inpatient Medications  amLODIPine   Tablet 10 milliGRAM(s) Oral daily  atorvastatin 40 milliGRAM(s) Oral at bedtime  brivaracetam 50 milliGRAM(s) Oral <User Schedule>  carvedilol 6.25 milliGRAM(s) Oral every 12 hours  dextrose 5%. 1000 milliLiter(s) IV Continuous <Continuous>  dextrose 5%. 1000 milliLiter(s) IV Continuous <Continuous>  dextrose 50% Injectable 25 Gram(s) IV Push once  dextrose 50% Injectable 12.5 Gram(s) IV Push once  dextrose 50% Injectable 25 Gram(s) IV Push once  doxazosin 4 milliGRAM(s) Oral at bedtime  finasteride 5 milliGRAM(s) Oral daily  furosemide    Tablet 80 milliGRAM(s) Oral daily  glucagon  Injectable 1 milliGRAM(s) IntraMuscular once  influenza  Vaccine (HIGH DOSE) 0.7 milliLiter(s) IntraMuscular once  insulin glargine Injectable (LANTUS) 3 Unit(s) SubCutaneous at bedtime  insulin lispro (ADMELOG) corrective regimen sliding scale   SubCutaneous three times a day before meals  levothyroxine 50 MICROGram(s) Oral daily  multivitamin 1 Tablet(s) Oral daily  pantoprazole    Tablet 40 milliGRAM(s) Oral before breakfast  phenytoin   Capsule 200 milliGRAM(s) Oral two times a day  predniSONE   Tablet 10 milliGRAM(s) Oral daily  sevelamer carbonate 800 milliGRAM(s) Oral three times a day  sirolimus 7 milliGRAM(s) Oral <User Schedule>  trimethoprim   80 mG/sulfamethoxazole 400 mG 1 Tablet(s) Oral daily  valGANciclovir 450 milliGRAM(s) Oral <User Schedule>    PRN Inpatient Medications  acetaminophen     Tablet .. 650 milliGRAM(s) Oral every 6 hours PRN  dextrose Oral Gel 15 Gram(s) Oral once PRN      REVIEW OF SYSTEMS  --------------------------------------------------------------------------------  General: no fever  MSK: no edema     VITALS/PHYSICAL EXAM  --------------------------------------------------------------------------------  T(C): 36.3 (11-19-22 @ 05:14), Max: 36.6 (11-18-22 @ 23:05)  HR: 64 (11-19-22 @ 05:14) (52 - 64)  BP: 162/71 (11-19-22 @ 05:14) (107/50 - 162/71)  RR: 18 (11-19-22 @ 05:14) (15 - 18)  SpO2: 95% (11-19-22 @ 05:14) (94% - 100%)  Wt(kg): --  Height (cm): 177.8 (11-18-22 @ 23:05)  Weight (kg): 66 (11-18-22 @ 23:05)  BMI (kg/m2): 20.9 (11-18-22 @ 23:05)  BSA (m2): 1.82 (11-18-22 @ 23:05)      11-18-22 @ 07:01  -  11-19-22 @ 07:00  --------------------------------------------------------  IN: 360 mL / OUT: 0 mL / NET: 360 mL      Physical Exam:  	Gen: NAD  	HEENT: MMM  	Pulm: CTA B/L  	CV: S1S2  	Abd: Soft, +BS  	Ext: No LE edema B/L                      Neuro: Awake   	Skin: Warm and Dry   	Vascular access: HD catheter            no  garrett  LABS/STUDIES  --------------------------------------------------------------------------------              7.1    1.72  >-----------<  248      [11-19-22 @ 06:20]              25.0     135  |  97  |  44  ----------------------------<  206      [11-19-22 @ 06:23]  4.5   |  24  |  2.39        Ca     8.6     [11-19-22 @ 06:23]      Mg     1.9     [11-19-22 @ 06:23]      Phos  3.0     [11-19-22 @ 06:23]    TPro  6.7  /  Alb  2.5  /  TBili  0.2  /  DBili  x   /  AST  25  /  ALT  15  /  AlkPhos  273  [11-19-22 @ 06:23]    PT/INR: PT 19.1 , INR 1.65       [11-19-22 @ 06:20]  PTT: 39.0       [11-19-22 @ 06:20]      Creatinine Trend:  SCr 2.39 [11-19 @ 06:23]  SCr 3.55 [11-18 @ 16:11]  SCr 2.44 [11-10 @ 06:05]  SCr 3.63 [11-09 @ 18:32]        Iron 20, TIBC 112, %sat 18      [11-10-22 @ 06:05]  Ferritin 1651      [11-10-22 @ 06:05]  PTH -- (Ca 8.4)      [09-29-22 @ 06:45]   183  PTH -- (Ca 8.6)      [09-22-22 @ 06:15]   178  PTH -- (Ca 8.1)      [09-15-22 @ 06:28]   202  PTH -- (Ca 9.2)      [02-05-22 @ 10:13]   294  HbA1c 6.0      [02-21-20 @ 08:53]  TSH 4.69      [07-06-22 @ 18:36]  Lipid: chol --, , HDL --, LDL --      [07-27-22 @ 13:00]

## 2022-11-19 NOTE — DISCHARGE NOTE PROVIDER - NSDCCPCAREPLAN_GEN_ALL_CORE_FT
PRINCIPAL DISCHARGE DIAGNOSIS  Diagnosis: Anemia  Assessment and Plan of Treatment: watch for signs of bleeding  follow closely with your nephrologist

## 2022-11-19 NOTE — DISCHARGE NOTE NURSING/CASE MANAGEMENT/SOCIAL WORK - PATIENT PORTAL LINK FT
You can access the FollowMyHealth Patient Portal offered by [facility] by registering at the following website: [email or web address]. By joining [facility]’s FollowMyHealth portal, you will also be able to view your health information using other applications (apps) compatible with our system.

## 2022-11-19 NOTE — DISCHARGE NOTE NURSING/CASE MANAGEMENT/SOCIAL WORK - NSDCPEPTCOWAFU_GEN_ALL_CORE
01-Feb-2022 15:43 Go for blood tests as directed. Because your dose is based on the PT/INR blood test, it is very important that you get your blood tested on the scheduled date and time and to keep your health care provider appointments.   Please follow up with your doctor within 3 days of discharge to schedule your next blood test.

## 2022-11-19 NOTE — DISCHARGE NOTE PROVIDER - CARE PROVIDERS DIRECT ADDRESSES
[email or web address]

## 2022-11-19 NOTE — DISCHARGE NOTE PROVIDER - NSDCFUSCHEDAPPT_GEN_ALL_CORE_FT
[facility]  NEUROLOGY [address] [place] [address]  Scheduled Appointment: 12/30/2022    Iker Alfred  [facility]  NEPHRO [address] [place] [address]  Scheduled Appointment: 01/19/2023    Andre Patterson  [facility]  NEUROLOGY [address] [place] [address]  Scheduled Appointment: 02/15/2023

## 2022-11-19 NOTE — PROGRESS NOTE ADULT - ASSESSMENT
66M with DM2, HTN, CAD with stents/CABG, AF on Coumadin, hx CVA, seizure, PUD, s/p failed renal transplant now back on HD, recent hospitalization for seizures requiring intubation and course complicated by UTI, as well as need for VATS due to large hemothorax, sent by [facility] for low hb. HD MWF, last HD was on 11/16/2022 per family recent GI work up about 1 month ago.    ESRD on HD MWF  from [facility]  last hd 11/18   Plan for HD on Sunday sec to holiday schedule  consent in HD unit from son  monitor    Anemia  recent GI work up  Aranesp with hd  s/p IV iron on 11/18  monitor Hb    HTN  BP fluctuating  MOnitor     CKD-MBD  monitor calcium and phos    
67 yr old man with h/o DM, HTN, CAD s/p CABG, Afib, CVA in 2019, Seizure d/o s/p DDRT from [facility] donor on 4/21/22.   Complicated post transplant course - initial DGF eventually recovered. Klebsiella UTI/Sepsis rx with antibiotics, status epilepticus, Envarsus discontinued for neurotoxicity/seizures and he was started on belatacept -> complicated by ANA due to rejection Grade 2a treated with pulse steroids, Currently HD dependent.     Admitted with anemia Hgb 6.5 as outpatient, 7.1 on repeat here. No significant symptoms.     S/p DDRT from [facility] donor on 4/21/22    Poor graft function due to Grade 2a rejection (biopsy 7/29) , c4d negative, No DSA.    S/p pulse solumedrol completed 8/3. Thymo not given due to frailty    Remains dialysis dependent.    HD today. UF 2 liters as tolerated. Continue lasix 80mg po daily     Immunosuppression   - MMF on hold due to recurrent infections and cytopenia. Currently on sirolimus and prednisone   - Continue sirolimus 7mg daily   - Continue prednisone 10mg po daily     Infection prophylaxis -Bactrim, Valcyte     Anemia multifactorial - transfuse 1 unit PRBC today, getting epo with HD. Ferritin 1600 and Tsat 18%  Leukopenia - give Neupogen 300mcg sq x1   Check CMV and parvovirus PCR     Hypertension - Coreg 6.25mg po bid, Amlodipine 10, Doxazosin 4mg   Diabetes - Continue Lantus 3 and Admelog pre meal and sliding scale   Seizure d/o - on Briviact and phenytoin. Dose Briviact after HD   A.fib - continue coumadin 5mg po qhs.     D/c home post HD and blood transfusion if remains stable.

## 2022-11-19 NOTE — DISCHARGE NOTE NURSING/CASE MANAGEMENT/SOCIAL WORK - NSDCVIVACCINE_GEN_ALL_CORE_FT
Tdap; 24-Jun-2021 20:00; Marcela Horn (RN); [employer]; [id number] (Exp. Date: 04-Sep-2022); IntraMuscular; Deltoid Left.; 0.5 milliLiter(s); VIS (VIS Published: 09-May-2013, VIS Presented: 24-Jun-2021);   Tdap; 09-Mar-2022 07:35; Wilson Agrawal (RN); [employer]; [id number] (Exp. Date: 28-Sep-2023); IntraMuscular; Deltoid Left.; 0.5 milliLiter(s); VIS (VIS Published: 09-May-2013, VIS Presented: 09-Mar-2022);

## 2022-12-30 ENCOUNTER — APPOINTMENT (OUTPATIENT)
Dept: NEUROLOGY | Facility: CLINIC | Age: 67
End: 2022-12-30

## 2023-01-19 ENCOUNTER — APPOINTMENT (OUTPATIENT)
Dept: NEPHROLOGY | Facility: CLINIC | Age: 68
End: 2023-01-19

## 2023-01-19 NOTE — DISCHARGE NOTE NURSING/CASE MANAGEMENT/SOCIAL WORK - HISTORY OF COVID-19 VACCINATION
Please let patient know that polyps removed were adenoma polyps.   These types of polyps are not cancer, but they are pre-cancerous (meaning that they can turn into cancers). Removing the polyp removes the risk of it becoming cancer.  Repeat colonoscopy is recommended in 3 years. 
Yes

## 2023-01-26 NOTE — HISTORY OF PRESENT ILLNESS
[FreeTextEntry1] : Diabetes New Patient HPI\par \par Stent placement last June ( Dr Morejon) \par 08/2019: Stroke and was in rehab ( previously was COVID positive) \par He reported that CABG was recent 08/14/2020 \par Currently he is on a transplant renal \par Dialysis for the past year (M, W, F) \par \par \par CC\par Patient referred by Dr. Corral  for diabetes management.\par \par \par HPI:\par \par Duration of Diabetes:    30 years      \par Is patient on Insulin?     10 years      \par If yes, how long on insulin?        \par \par List Current Medications for Glycemic control and the doses:\par 1-   Levemir 4-5 units  at bedtime \par 2-    Novolog 3 units TID with meals ( SS # 1 )   \par 3-        \par \par SMBG (self monitored blood glucose) readings: \par - Name of glucometer:          \par - How often does the patient check BG?             \par - Does the patient keep a log?           \par \par If detailed record is available, what is the range of most of the BG readings?\par - Before Breakfast: \par - Before Lunch: 135-150\par - Before Dinner: 180-250\par - Before Bedtime: 135-220\par \par \par \par Does patient get Hypoglycemic episodes? 1-2 times              \par If yes how frequent?            \par Hoe low do the BG readings reach?  60s         \par When do most of those episodes occur?  In the middle of night          \par What symptoms does the patient get during those episodes?  Hungry, sweating, heart palpitations          \par \par Diabetic Complications: Is patient aware of having any of those complications?\par - Eyes: Retinopathy?        Retinopathy- laser treatement    \par        	When was the last fully dilated eye exam?  Last year ( 10/2019) - Dr. Verdin          \par - Feet: 	Neuropathy?  Yes           \par         	Foot Ulcers?    No     \par 	When was the last time patient saw a Podiatrist?    No      \par - Kidneys: Nephropathy?    ESRD        \par    \par \par Diet: review patient's diet:      \par Breakfast: Eggs, tea, whole bread ( 1 slice), oatmeal, roti \par Lunch: 2 rotis, nikunj, vegetables \par Dinner: 1 roti, chicken, soup\par Snacks: Cookies, protein bar, yogurt \par Juice or soda: None \par Desserts: Ice cream \par \par \par Exercise: review patient exercise habits:         Walking x 15 mins          \par \par Symptoms: Write any symptoms, concerns and issues bothering the patient which have not be addressed above:     \par No blurry vision\par  done

## 2023-02-15 ENCOUNTER — APPOINTMENT (OUTPATIENT)
Dept: NEUROLOGY | Facility: CLINIC | Age: 68
End: 2023-02-15

## 2023-04-19 NOTE — PROGRESS NOTE ADULT - PROBLEM SELECTOR PLAN 8
Pt with intermittent hypoglycemic episodes. Was previously on levemir 8 units and premeal 3 tidwm prior to admission  - continue lantus 12 units qhs  - MDSS with fingerstick monitoring  - will transition from continuous feeds to bolus- nutrition consult    # abd pain- likely 2/2 gas as improved after gas-x  - abd xray showing nonspecific bowel gas pattern, no obstruction or ileus suspected  - continue gas-x PRN  - serial abd exams no

## 2023-04-20 ENCOUNTER — APPOINTMENT (OUTPATIENT)
Dept: NEPHROLOGY | Facility: CLINIC | Age: 68
End: 2023-04-20

## 2023-05-15 NOTE — PRE-ANESTHESIA EVALUATION ADULT - NSANTHAIRWAYFT_ENT_ALL_CORE
Plan for Kash:     1) Cetirizine 10mL daily (liquid medicine)  2) Montelukast 5mg 1 chew tablet per day (chew)  3) Zaditor (ketotifen) eye drops 2x/day (morning/night)       neck FROM, TMD > 3 FB

## 2023-05-20 NOTE — PATIENT PROFILE ADULT - ARE SIGNIFICANT INDICATORS COMPLETE.
-Likely metabolic Encephelopathy / sepsis now   s/p unwitnessed fall with small abrasions of face  - CT head/cervical spine/ maxillofacial non-con: No acute intracranial abnormalities. Mild right periorbital subcutaneous soft tissue swelling. No evidence of orbital or other facial fracture. No vertebral fracture, collapse or subluxation.  - f/u repeat CT head noncon: no acute acute signs of brain hemorrhage  - Mild hepatic encephalopathy, ammonia elevated  - continue to monitor, Rx sepsis No Yes

## 2023-05-30 NOTE — PROGRESS NOTE ADULT - PROBLEM/PLAN-9
Please refer to 5/30/23 telephone encounter - this is a duplicate encounter.     Writer will close at this time.  
DISPLAY PLAN FREE TEXT

## 2023-08-15 NOTE — PRE-OP CHECKLIST - HEIGHT IN CM
177.8 Consent 3/Introductory Paragraph: I gave the patient a chance to ask questions they had about the procedure.  Following this I explained the Mohs procedure and consent was obtained. The risks, benefits and alternatives to therapy were discussed in detail. Specifically, the risks of infection, scarring, bleeding, prolonged wound healing, incomplete removal, allergy to anesthesia, nerve injury and recurrence were addressed. Prior to the procedure, the lesion was circled and shown to the patient and patient's family member, if present, in the mirror. The patient confirmed this was the correct location. All components of Universal Protocol/PAUSE Rule completed.

## 2023-08-30 NOTE — DIETITIAN INITIAL EVALUATION ADULT - OTHER INFO
[Negative] : Heme/Lymph -- Per sons pt's UBW 158lb. Per previous RD notes pt 145lb (5/4/22), 135lb (4/23/22). Dosing weight this admission 136lb (6/10). Suggests 9lb wt loss x 1 month, 6.2% - clinically significant. Of note, pt s/p thoracentesis 6/11 -1.3L, weight may be lower now. Pt also with edema. Will continue to monitor.  -- Pt with hx of DM, HbA1c 6.6% in April 2022. Pt on Humalog and insulin glargine for BG management. Of note, pt on Prednisone daily s/p DDRT. Sliding scale insulin ordered for glycemic management in house.  -- Pt off sedation this AM, previously receiving propofol.

## 2023-09-11 NOTE — PROGRESS NOTE ADULT - PROVIDER SPECIALTY LIST ADULT
Internal Medicine Pt reports that after 30 minutes of driving, his legs and feet can become almost completely numb. This is usually worse on his R side but does occur on the left side after about 30 minutes. He tries to reposition himself while driving but this does not provide much relief. Only relief is when pt is in a standing position and is able to walk around. The time it takes before numbness subsides can vary but pt believes it can be up to 10-15 minutes. He returned to work today and is not sure if his employer would agree to 10-15min breaks every 30 minutes. He was informed that message will be routed to provider to advise.      Vale Ernandez, RNCC  Neurology/Neurosurgery

## 2023-09-15 NOTE — ED ADULT NURSE NOTE - CAS TRG GEN SKIN COLOR
Pt called to follow up on the medication request below, requested that another message be sent so the pt can get the medication today    Normal for race

## 2023-10-07 NOTE — DISCHARGE NOTE PROVIDER - DISCHARGE SERVICE FOR PATIENT
on the discharge service for the patient. I have reviewed and made amendments to the documentation where necessary. without difficulty

## 2023-11-03 NOTE — OCCUPATIONAL THERAPY INITIAL EVALUATION ADULT - REHAB POTENTIAL, OT EVAL
Subjective   Patient ID: Vinny is a 18 month old male.  History obtained from patient and parent  Chief Complaint   Patient presents with   • Ear Pain     Fussy        HPI: Vinny is a 18 month old male with a PMH significant for none who presents with fussy and pulling on the ears  Started about a week ago with cough, congestion, rhinorrhea  Overall the URI seemed to be improved with some residual congestion  Over the past couple of nights slightly more fussy and pulling on ears some  ?teething currently  Low grade temp earlier in the week  Took motrin today for otalgia    Denies fever,, wheezing, SOB, headache, abdominal pain, V/D, rash, conjunctivitis, trouble breathing, neuro changes, swelling or the hands or feet, or sore throat.  Eating and drinking well at home with normal UOP >4 per day.      +sick contacts in the household  Pt is NOT COVID vaccinated      I personally reviewed and analyzed notes from PMD    No past medical history on file.    MEDICATIONS:  No current outpatient medications on file.     No current facility-administered medications for this visit.       ALLERGIES:  ALLERGIES:  No Known Allergies    PAST SURGICAL HISTORY:  No past surgical history on file.    FAMILY HISTORY:  No family history on file.    SOCIAL HISTORY:         VACCINES:  UTD    Review of Systems A 12 point review of systems was performed. Pertinent positives and negatives are noted in the HPI.       Visit Vitals  Temp 98 °F (36.7 °C) (Temporal)   Wt 13.3 kg (29 lb 6.9 oz)       Physical Exam  General: awake, alert, well nourished, well appearing running around and playing  HEENT: normocephalic, sclerae white, pupils equal, round, and reactive to light, extraocular movements intact, normal external ears, tympanic membranes normal, nares congested with clear rhinorrhea, mucous membranes moist, oropharynx erythematous without lesions  Neck: supple, shotty LAD  Heart/CV: regular rate and rhythm, no murmur  Lungs/Chest: breathing  in the 30's without distress good expansion and aeration bilaterally  No wheezing.  No rales, retractions, flaring or tugging  Abdomen/GI: soft, non-tender, non-distended, normoactive bowel sounds, no hepatosplenomegaly  Extr/Muscle: full range of motion of all extremities  Neuro: alert, interactive, normal tone, moves all extremities well    No LOS data to display  This includes pre-charting, chart review and documenting.   ASSESSMENT/ PLAN: Vinny is a 18 month old male presents with cough, congestion, rhinorrhea likely due to viral URI.  Pt is well appearing and well hydrated on exam without respiratory distress.  VIRAL URI:  -Home with supportive care  -Reassurance provided regarding viral nature of URIs, need for supportive care, encourage fluids   -Anticipatory guidance and return precautions discussed with family, handout given with AVS  -reviewed dosing of antipyretics   -adjunct therapies including nasal mucous clearance, humidifier, honey for the cough (avoid OTC cough medications of decongestants),   -PMD follow-up as needed if symptoms persist or worsen (including but not limited to worsening respiratory distress, lethargy, signs of LRTI, AOM, or other secondary infection, dehydration or fever persisting more than 4-5 days)  -Family expressed agreement with plan, all questions answered.    FOLLOW-UP:  No follow-ups on file.     Plan of care and anticipatory guidance discussed with patient/parents at bedside.  Parents displayed good understanding of plan of care.    Nadya Harris MD       good, to achieve stated therapy goals

## 2023-11-03 NOTE — PROGRESS NOTE ADULT - ASSESSMENT
66 year old Male with PMHx ESRD s/p permacath removal 5/10/22 s/p Rt DDRT 4/21/22,  CVA (2019), Afib on apixaban, seizure activity, CAD s/p stents, CABG (2020), HTN, HLD, DM on insulin, gastric/duodenal ulcer, recent hospitalization 4/28/22 -5/10/22 with weakness/anemia found to have perinephric hematoma requiring evacuation and repair of bleeding arterial anastomosis who presented to [facility] for status epilepticus requiring intubation for hypoxic respiratory failure.    At [facility] was intubated on 6/10  On 6/11 underwent thoracentesis in context of Eliquis therapy  Developed bleeding into pleural space and require Chest tube placement  Planned for VATS    COVID19/FLU/RSV PCR (6/10) Negative  U/A (6/10) with 5 WBC and 25 RBC  Blood Cultures (6/10) NGTD  Pleural Fluid with 71 nucleated cells, 50% PMN, 31% Lymph.   Pleural Fluid Culture (6/11) Negative   CT Chest (6/12) with Right-sided chest tube with large hemothorax and atelectasis of the right lower lobe.    On 6/15 s/p uniportal R VATS, evacuation of hemothorax, pneumonolysis, decortication, intercostal nerve block.    #Positive Urine Culture (?UTI), Leukocytosis  Of note, was being treated for UTI at rehab  UCx from 6/1 with >100K ESBL Klebsiella (Cipro R but Levofloxacin S)  He received 1-2 doses of Ertapenem and was then switched to Levofloxacin  Doubt the Levofloxacin was effective therapy given Cipro resistance  Unclear if positive UCx represented UTI but reasonable to at least give short course of effective/reliable therapy  --Complete Ertapenem with dose tomorrow (Day 5)    #Renal Transplant Recipient, Prophylactic Antibiotic  --Continue Bactrim for PCP PPx  --Continue Valcyte for CMV PPx    I will sign off at this time. Please feel free to contact me with any further questions or concerns.    Carlton Hoover M.D.  [facility] Division of Infectious Disease  8AM-5PM Monday - Friday: Available on [employer] Teams  After Hours and Holidays (or if no response on [employer] Teams): Please contact the Infectious Diseases Office at [phone]     The above assessment and plan were discussed with transplant surgery team  [facility] Hospital Medicine Progress Note        Chief Complaint: Inpatient Follow-up for     HPI:   69-year-old obese woman with history of AFib on Xarelto, T2 dm, CKD, hypertension, hyperlipidemia presented with worsening of lower back pain, generalized weakness.  Patient had a fall 2 days ago, diagnosed with T12 vertebral fracture.  She denied any loss of bladder or bowels.  At admission she was normotensive and hemodynamically stable.  Lab work revealed elevated BUN and serum creatinine, cloudy urine with multiple white cells.  Urine sample were sent for cultures.  Recent CT scan obtained on 10/23/2023 revealed atrophy of left kidney, nonobstructing nephrolithiasis in the lower pole of the left kidney, cholelithiasis, umbilical hernia, diverticulosis.     MRI spine spine revealed T12 vertebral body compression deformity with multilevel spondylitic changes.  Neurosurgery recommended brace and no surgical intervention.  Nephrology was consulted for LORI.  Bicarb drip was added admission due to metabolic acidosis and later transitioned to normal saline.  P.o. bicarb was continued.  Renal function has improved back to baseline after IV hydration.  PT was started and she was participating.  Ceftriaxone was added for abdominal unit admission.  Urine cultures resulted as Proteus, Enterobacter cloace. she is currently awaiting SNF placement    Interval Hx:   No issues overnight.  Comfortably resting.  Pain is well controlled.  Tolerating. .  Currently awaiting placement.  Doing well on room air.  Caregiver was at bedside.  Loading labs were not obtained for this morning       Objective/physical exam:  Vitals:    11/03/23 0753 11/03/23 0901 11/03/23 1126 11/03/23 1127   BP: 117/68 117/68  115/61   Pulse: 78 78  77   Resp: 18  18    Temp: 98 °F (36.7 °C)   97.3 °F (36.3 °C)   TempSrc: Oral   Oral   SpO2: (!) 93%   96%   Weight:       Height:         General: In no acute distress,  afebrile  Respiratory: Clear to auscultation bilaterally  Cardiovascular: S1, S2, no appreciable murmur  Abdomen: Soft, nontender, BS +  MSK: Warm, no lower extremity edema, no clubbing or cyanosis  Neurologic: Alert and oriented x4, moving all extremities with good strength     Lab Results   Component Value Date     11/02/2023    K 3.9 11/02/2023    CL 92 (L) 07/02/2020    CO2 25 11/02/2023    BUN 64.9 (H) 11/02/2023    CREATININE 3.05 (H) 11/02/2023    CALCIUM 8.9 11/02/2023    ANIONGAP 7 (L) 07/02/2020    ESTGFRAFRICA 22 07/02/2020    EGFRNONAA 17 07/21/2022      Lab Results   Component Value Date    ALT 18 10/30/2023    AST 19 10/30/2023    ALKPHOS 104 10/30/2023    BILITOT 0.4 10/30/2023      Lab Results   Component Value Date    WBC 9.74 10/31/2023    HGB 13.2 10/31/2023    HCT 39.1 10/31/2023    MCV 93.3 10/31/2023     10/31/2023           Medications:   amiodarone  200 mg Oral Daily    amLODIPine  10 mg Oral BID    apixaban  5 mg Oral BID    ascorbic acid (vitamin C)  1,000 mg Oral Daily    aspirin  81 mg Oral Daily    atorvastatin  10 mg Oral Daily    buPROPion  150 mg Oral Daily    docusate sodium  100 mg Oral BID    famotidine  20 mg Oral Daily    folic acid  1,000 mcg Oral Daily    gabapentin  100 mg Oral BID    metoprolol succinate  50 mg Oral BID    multivitamin  1 tablet Oral Daily    rOPINIRole  0.5 mg Oral Daily    sodium bicarbonate  650 mg Oral Daily    vitamin E (dl, acetate)  400 Units Oral Daily    zinc oxide-cod liver oil   Topical (Top) BID      sodium chloride 0.9%, acetaminophen, dextrose 10%, dextrose 10%, glucagon (human recombinant), glucose, glucose, insulin aspart U-100, morphine, ondansetron, traMADoL     Assessment/Plan:    T12 vertebral body compression fracture   Impingement of L2-L4  nerve roots, severe bilateral neural foraminal narrowing  Multilevel spondylitic changes  Bilateral lower extremity weakness due to above   LORI on CKD-improved  Metabolic acidosis -  resolving  Proteus, Enterobacter UTI-POA s/p Rx     HX:  T2 dm, AFib, HTN, HLD, ACD    Plan:  -continue therapy as tolerated.  Analgesics as needed.  Awaiting SNF placement.  Neurosurgery recommended brace  -renal back to baseline.  Try to avoid nephrotoxic medications if possible.  Needs outpatient follow up after SNF  -home medications were reviewed.  Anticoagulation switch to Eliquis due to CKD  -other home meds were reviewed    Charlotte Andrews MD

## 2023-11-10 NOTE — PROGRESS NOTE ADULT - ATTENDING COMMENTS
-Patient seen/examined on 1/3/20. Case/plan discussed with the intern and resident as reviewed/edited by me above and in any comments below.  -Spoke with patient and his family at bedside. Patient more lethargic today. -Discussed with bedside RN. Now having BM after suppository given yesterday.   -Miranda placed for retention yesterday, appears like possible UTI. -Agree with broadening abx to meropenem given worsening mental status and hypotension. -F/u blood and urine cultures. -Might also have line infection. -Might need additional vanco until line infection ruled out.   -Gave some albumin for low BP earlier.   -Appreciated NSICU taking patient based on CT head findings and worsening clinical status. -Hold AC and plavix for now and give Kcentra per SUSHMA. -F/u repeat CT head afterwards. Xelviolaz Pregnancy And Lactation Text: This medication is Pregnancy Category D and is not considered safe during pregnancy.  The risk during breast feeding is also uncertain.

## 2023-12-15 NOTE — PROGRESS NOTE ADULT - ASSESSMENT
Assessment & Plan:        ICD-10-CM    1. Chronic nasal congestion  R09.81 loratadine (CLARITIN) 5 MG/5ML solution      2. Chronic cough  R05.3             Plan/Clinical Decision Making:    Patient having cough two months since bad viral illness. Has had ongoing nasal congestion without signs of sinusitis. Hasn't had wheezing or SOB. No history of asthma or allergies.   Trial of Claritin to see if that helps with congestion and cough.       Return in about 2 weeks (around 12/29/2023), or if symptoms worsen or fail to improve, for with primary care provider.     At the end of the encounter, I discussed results, diagnosis, medications. Discussed red flags for immediate return to clinic/ER, as well as indications for follow up if no improvement. Patient understood and agreed to plan. Patient was stable for discharge.        Evita Flores PA-C on 12/15/2023 at 12:17 PM          Subjective:     HPI:    Pawan is a 3 year old male who presents to clinic today for the following health issues:  Chief Complaint   Patient presents with    Urgent Care     Since mid October cold symptoms, coughing, getting worse, up at night, some days better, runny nose, clear/green mucus, low grade temps,   Taking delsym and mucinex powder packet,       HPI    Patient has had ongoing cough. Had a bad illness in October and since then coughing at night.   No hx of allergies or asthma. Cough seems to be more productive.   Has had ongoing nasal drainage, can be clear to yellow.   No GI symptoms.   Cough better during day.     Review of Systems   Constitutional:  Negative for fever.   HENT:  Positive for congestion and rhinorrhea.    Respiratory:  Positive for cough. Negative for wheezing.    Gastrointestinal:  Negative for abdominal pain.         There is no problem list on file for this patient.       No past medical history on file.    Social History     Tobacco Use    Smoking status: Not on file    Smokeless tobacco: Not on file    Substance Use Topics    Alcohol use: Not on file             Objective:     Vitals:    12/15/23 1144   Pulse: 104   Resp: 20   Temp: 99  F (37.2  C)   TempSrc: Tympanic   SpO2: 100%   Weight: 17.8 kg (39 lb 3.2 oz)         Physical Exam   EXAM:   Pleasant, alert, appropriate appearance. NAD.  Head Exam: Normocephalic, atraumatic.  Eye Exam:  non icteric/injection.    Ear Exam: TMs grey without bulging. Normal canals.  Normal pinna.  Nose Exam: Normal external nose.  Mild nasal congestion.   OroPharynx Exam:  Moist mucous membranes. No erythema, pharynx without exudate or hypertrophy.  Neck/Thyroid Exam:  No LAD.    Chest/Respiratory Exam: CTAB.  Cardiovascular Exam: RRR. No murmur or rubs. No edema.     Results:  No results found for any visits on 12/15/23.     65 yo male with ESRD, HD, PAF, admitted with multiple CVA's.  He has had difficulty with pulmonary toilet and airway secretions.  Remains hemodynamically stable with clear CXR and CT scan.  S/P PEG   Afebrile, WBC normal   Sptm with Enterobacter and MSSA- viewed as colonizers- remains off Tx  Respiratory status improved with conservative measures    Suggest:  Observe off antibiotics.   D/c planning as noted  Re consult with us if status changes

## 2023-12-22 NOTE — ED PROVIDER NOTE - HIV OFFER
Discussed with Dr. Liu and pt started on aspirin 81mg PO QD and Eliquis resumed.     JONES Jamesc
Previously Negative (within the last year)

## 2023-12-30 NOTE — PROGRESS NOTE ADULT - PROVIDER SPECIALTY LIST ADULT
Right Shoulder Pain    Meloxicam daily, hold ibuprofen.    Tylenol 500 - 1000 mg every six hours as needed for pain.    Continue heat/ice.    Consider voltaren gel/salonpas patches.    Follow up with orthopedics if symptoms do not improve.    PT for symptoms.    Return to rapid clinic or ER for worsening symptoms.    Thoracic Surgery

## 2024-01-24 NOTE — DIETITIAN INITIAL EVALUATION ADULT. - POUNDS LOST/GAINED
Subjective:       Patient ID: Yulia Haynes is a 31 y.o. female.    Chief Complaint:  Well Woman (Last normal pap was 2021.)        History of Present Illness  HPI  Annual Exam-Premenopausal  Patient presents for annual exam. The patient is sexually active. GYN screening history: last pap: approximate date 2021 and was normal. The patient wears seatbelts: yes. The patient participates in regular exercise: no. Has the patient ever been transfused or tattooed?:  no/yes . The patient reports that there is not domestic violence in her life.    New partner.  Now with a greenish yellow discharge with odor for the past week  Would like STD screening.    Has been with significant dysmenorrhea since her Laparoscopic bilateral salpingectomy      GYN & OB History  Patient's last menstrual period was 2023 (exact date).   Date of Last Pap: 2021    OB History    Para Term  AB Living   5 2 2   1 2   SAB IAB Ectopic Multiple Live Births         0 2      # Outcome Date GA Lbr Hermilo/2nd Weight Sex Delivery Anes PTL Lv   5             4 Term 09/14/15 39w3d  3.443 kg (7 lb 9.5 oz) F Vag-Spont EPI N ABEL   3 Term 08 40w0d  3.062 kg (6 lb 12 oz) M Vag-Spont EPI N ABEL   2 AB 2006           1                Obstetric Comments   Gynhx: reg   Denies std   Denies abnl pap, Pap neg        Past Medical History:   Diagnosis Date    Anemia     this pregnancy    Herpes simplex without mention of complication     Last outbreak 3/2015    Kidney stones        Past Surgical History:   Procedure Laterality Date    COSMETIC SURGERY  2017    LIPO    LAPAROSCOPIC SALPINGECTOMY Bilateral 11/15/2023    Procedure: SALPINGECTOMY, LAPAROSCOPIC;  Surgeon: Awais Hammonds MD;  Location: [facility];  Service: OB/GYN;  Laterality: Bilateral;  RN PRE OP 23;  T & S--done and UPT--NEG  ON  23       Family History   Problem Relation Age of Onset    Diabetes Neg Hx     Hypertension Neg Hx        Social  History     Socioeconomic History    Marital status: Significant Other   Tobacco Use    Smoking status: Never    Smokeless tobacco: Never   Substance and Sexual Activity    Alcohol use: Yes     Comment: occasionally    Drug use: No    Sexual activity: Yes     Partners: Male     Birth control/protection: None   Social History Narrative    Together since 2010     10/8/2021    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       Current Outpatient Medications   Medication Sig Dispense Refill    MYRBETRIQ 50 mg Tb24 Take 1 tablet by mouth.      oxybutynin (DITROPAN) 5 MG Tab Take 5 mg by mouth.       No current facility-administered medications for this visit.       Review of patient's allergies indicates:   Allergen Reactions    Penicillins Rash     Tolerated rocephin 7/2021        Review of Systems  Review of Systems   Constitutional:  Negative for activity change, appetite change, chills, fatigue, fever and unexpected weight change.   HENT:  Negative for mouth sores.    Respiratory:  Negative for cough, shortness of breath and wheezing.    Cardiovascular:  Negative for chest pain and palpitations.   Gastrointestinal:  Negative for abdominal pain, bloating, blood in stool, constipation, nausea and vomiting.   Endocrine: Negative for diabetes and hot flashes.   Genitourinary:  Positive for dysmenorrhea, menorrhagia, menstrual problem, pelvic pain, vaginal discharge and vaginal odor. Negative for dyspareunia, dysuria, frequency, hematuria, urgency, vaginal bleeding, vaginal pain, urinary incontinence and postcoital bleeding.   Musculoskeletal:  Negative for back pain and myalgias.   Integumentary:  Negative for rash, breast mass and nipple discharge.   Neurological:  Negative for seizures and headaches.   Psychiatric/Behavioral:  Negative for depression and sleep disturbance. The patient is not nervous/anxious.    Breast: Negative for mass, mastodynia and nipple  discharge          Objective:    Physical Exam:   Constitutional: She appears well-developed and well-nourished. No distress.   BMI of 27.51    HENT:   Head: Normocephalic and atraumatic.    Eyes: EOM are normal.      Pulmonary/Chest: Effort normal. No respiratory distress.   Breasts: Non-tender, no engorgement, no masses, no retraction, no discharge. Negative for lymphadenopathy.         Abdominal: Soft. She exhibits no distension. There is no abdominal tenderness. There is no rebound and no guarding.     Genitourinary:    Uterus normal.   There is vaginal discharge in the vagina.    Genitourinary Comments: Vulva without any obvious lesions.  Urethral meatus normal size and location without any lesion.  Urethra is non-tender without stricture or discharge.  Bladder is non-tender.  Vaginal vault with good support.  Moderate greenish yellow discharge with odor noted.  No obvious lesion.  Normal rugation.  Cervix is without any cervical motion tenderness.  No obvious lesion.  Uterus is small, non-tender, normal contour.  Adnexa is without any masses or tenderness.  Perineum without obvious lesion.               Musculoskeletal: Normal range of motion.       Neurological: She is alert.    Skin: Skin is warm and dry.    Psychiatric: She has a normal mood and affect.          Assessment:        1. Well woman exam with routine gynecological exam    2. Vaginal discharge    3. Chronic vaginitis    4.  Dysmenorrhea        Plan:          I have discussed with the patient her condition.  Monthly breast examination was instructed, discussed, and encouraged.  Patient was encouraged to consume a low-calorie, low fat diet, and to increase of physical activity.  Healthy habits encouraged.  A Pap smear was performed with HR-HPV according to the USPSTF recommendations.  Mammogram was not ordered because of the combination of her age and risk factors, according to ACOG guidelines.  Gonorrhea and Chlamydia testing performed;  HIV test   offered, again according to guidelines.  Colonoscopy discussed according to ACS guideline.  We also discussed her obstetric history and CV risks.   Care Gaps addressed.  Patient is to continue her medications as prescribed.  She will come back to see me in one year for her annual visit.  She can come back to see me sooner as necessary.  All of her questions were answered appropriately to her satisfaction.     Vaginosis screen for vaginal discharge  BAS  Motrin 600 mg for dysmenorrhea    Back in 4 weeks        20

## 2024-02-21 NOTE — PROGRESS NOTE ADULT - ATTENDING COMMENTS
Patient seen and examined and agree with above.   Admitted for status epilepticus to MICU and transferred to SICU after hemothorax after thoracentesis. Overnight chest tube placed, patient hypotensive and then patient was intubated.   4 units PRBC, 2 Plasma and protamine.     Current management includes:  Neuro- EEG with slowing but no epileptic activity. Continue keppra and valproate.   Follows commands. Left side weaker from CVA which is baseline for the patient.   CV- Hypotension - phenyleprhine on 0.5 mcg/kg/min  Lactate 1.6  Pulm - chest tube in place 1 liter total output since placement  CXR with some continued hemothorax as continues to look hazy; thoracic surgery on board     -goal SpO2 92%  GI- NPO and continue protonix   ID- continue cefepime and fluconazole.   Heme - will repeat Hct   Renal - making 15-20 cc urine output   improving creatinine   Endocrine - hyperglycemia - continue ISS with goal BG < 180      This patient was critically ill with one or more vital organ systems at a high probability of imminent or life threatening deterioration. Complex decision making was required to assess and treat single or multiple vital organ system failure and/or prevent further life threatening deterioration of the patient's condition.   cc time: 43 min Family

## 2024-02-23 NOTE — DISCHARGE NOTE NURSING/CASE MANAGEMENT/SOCIAL WORK - NSDCVIVACCINE_GEN_ALL_CORE_FT
Lab Results   Component Value Date    EGFR 53 07/14/2023    EGFR 71 10/19/2022    EGFR 99 03/27/2019    CREATININE 1.12 07/14/2023    CREATININE 0.93 10/19/2022    CREATININE 0.76 01/08/2021   - GFR low.   - Increase oral hydration.   - Due for updated CMP. If no improvement consider referral to Nephrology.    Tdap; 24-Jun-2021 20:00; Marcela Horn (RN); [employer]; [id number] (Exp. Date: 04-Sep-2022); IntraMuscular; Deltoid Left.; 0.5 milliLiter(s); VIS (VIS Published: 09-May-2013, VIS Presented: 24-Jun-2021);

## 2024-04-15 NOTE — PROGRESS NOTE ADULT - PROBLEM/PLAN-10
Patient called needs refill of omeprazole and famotidine.    Laurence Kessler, PharmD  Medication Therapy Management Pharmacist       DISPLAY PLAN FREE TEXT

## 2024-05-19 NOTE — PROGRESS NOTE ADULT - ASSESSMENT
65 yo M w/ PMH HTN, CAD s/p LAD stent 2009, 2019, carotid stenosis, ESRD on HD (MWF Dr Cardenas, [facility]- on transplant list), DM (on IV insulin, controlled, managed by Jamie Corral), CVA c/b status epilepticus requiring intubation and PEG in 12/2019-went to rehab, peg was removed July 2020, dysphagia s/p PEG, atrial fibrillation (on eliquis last dose 8/5/2020), anemia and GI bleed (Feb 2020) who presents today for cardiac angiogram. Patient reports cardiac evaluation for renal transplant- pharm stress test completed on 7/31/2020, revealing abnormal study: there is medium sized, moderate to severe defects in mid to distal anterior, mid to distal anterolateral walls predominantly reversible consistent with infarction with sig.. zackery- infarct ischemia. LVEF 45%. Recommended for cardiac angiogram for further ischemic evaluation. Patient is s/p cath-TVD-plan for CABG 8/13/20 with Dr. Guillory.     Son [phone] (07 Aug 2020 06:50)    8/12 VSS; AFB x3 Negative - HD today, plan to remove permacath by IR after HD. Plan for OR with Dr. Guillory for CABG tomorrow 8/13/20.     8/13/20 Off-Pump CABG x 3: LIMA-LAD, LSVG-OM1, LSVG-OM2  Urgent post op dialysis for hyperkalemia, permacath d/c preop, temp catheter placed  He was on a dysphagia diet at home preop, s&s consult pending.  Afib preop, on eliquis, resumed, beta blockers started.  Transferred to [facility].  8/15 The patient has  a central line that will need to be removed, hold eliquis.  Will need a permacath , ? monday, will need to contact IR Monday, consult placed in sunrise)  H/o dysphagia, placed on dys 3 diet thickened liquids , speech and swallow following  + strongyloide antibody as per ID, 2 days of iverrectin( potential renal transplant candidate)  8/16 VSS - glucose 144 this am - rounds made w/ Dr. Whitney this am - can isolate Pw's & transfer to floor alert

## 2024-06-09 NOTE — ED PROVIDER NOTE - INTERNATIONAL TRAVEL
PHYSICAL EXAM:    GENERAL: Alert, appears stated age, well appearing, non-toxic  SKIN: Warm, and dry. MMM.   HEAD: NC, AT  EYE: Normal lids/conjunctiva  ENT: Normal hearing, patent oropharynx without exudate, TMs clear b/l. uvula midline + tonsilar erythema. no LAD  NECK: +supple. No meningismus, or JVD  Pulm: Bilateral BS, normal resp effort, no wheezes, stridor, or retractions. speaking in complete sentences.   CV: RRR, no M/R/G, 2+and = radial pulses  Abd: soft, non-tender, non-distended, no rebound/guarding. no CVA tenderness.   Mskel: no erythema, cyanosis, edema. no calf tenderness  Neuro: AAOx3, normal gait.
No

## 2024-07-26 NOTE — PROCEDURE NOTE - NSPROCNAME_GEN_A_CORE
Interventional Radiology [Negative] : Allergic/Immunologic [Constipation] : no constipation [de-identified] : incision dry, no drainage, no erythema or warmth [FreeTextEntry6] : recent pneumonia with pleural effusion - see Interval History [FreeTextEntry1] : large complex lymphatic malformation, decreased swelling, continues to feel soft

## 2024-08-25 NOTE — PATIENT PROFILE ADULT - DO YOU LACK THE NECESSARY SUPPORT TO HELP YOU COPE WITH LIFE CHALLENGES?
Follow up in 7-10 days for suture removal. OK to go to your regular clinic.  Return to care if any evidence of infection such as increased pain, redness, swelling, drainage, or fevers.  Keep wound covered when working with food at work.  
no

## 2024-08-27 NOTE — SWALLOW BEDSIDE ASSESSMENT ADULT - DIET PRIOR TO ADMI
----- Message from Dimitrios Brock LPN sent at 8/27/2024  8:01 AM CDT -----  Regarding: sanjeev ayala 9/4 @11  Are there any outstanding tasks in patient chart? Needs fasting labs    Is there documentation of outstanding tasks in patient chart? no    Has patient been to the ER, urgent care, or another physician since last visit?    Has patient done any blood work or x-rays since last visit?    5. PLEASE HAVE PATIENT BRING MEDICATION LIST OR BOTTLES TO EVERY OFFICE VISIT   Dysphagia diet per EMR Regular per Pt son

## 2024-09-04 NOTE — H&P ADULT - MINUTES
The patient has been re-examined and I agree with the above assessment or I updated with my findings.
45

## 2024-09-06 NOTE — PRE-ANESTHESIA EVALUATION ADULT - MALLAMPATI CLASS
well developed, well nourished , in no acute distress , ambulating without difficulty , normal communication ability Class II - visualization of the soft palate, fauces, and uvula

## 2024-11-13 NOTE — PHYSICAL THERAPY INITIAL EVALUATION ADULT - LIVES WITH, PROFILE
Controlled.  Continue atorvastatin.   Pt resting comfortably. Denies pain or nausea at this time.  at the bedside.    spouse/children

## 2024-11-19 NOTE — PRE-ANESTHESIA EVALUATION ADULT - NSANTHDIETYNSD_GEN_ALL_CORE
Patient incontinent of urine, unable to stand from wheelchair, and needed 3 assist to transfer from wheelchair to bed. Patient placed in clean gown and given warm blanket.     Ivana Cortes RN  11/19/24 [id number]     Yes

## 2025-02-12 NOTE — PROGRESS NOTE ADULT - PROBLEM SELECTOR PLAN 6
Continue with statin and baby aspirin  Continue with Keppra for seizures. Neurology eval noted. Hyponatremia prob from seizures
Continue with statin and baby aspirin  Continue with Keppra for seizure prophylaxis
normal...
Continue with statin and baby aspirin  Continue with Keppra for seizures. Neurology eval noted. Hyponatremia prob from seizures
Yes

## 2025-02-25 NOTE — PROGRESS NOTE ADULT - PROBLEM SELECTOR PLAN 9
DVT ppx: Patient on Eliquis  Diet: has tube feeds to PEG tube  Dispo: Likely rehab if sugars stabilize and bp improves on coreg normal for race

## 2025-03-21 NOTE — DIETITIAN INITIAL EVALUATION ADULT. - PROBLEM SELECTOR PROBLEM 6
PER CANDACE GONZALEZ, APRN: Blood pressure log reviewed, blood pressures are persistently elevated recommend starting amlodipine 5 mg once daily, and submit new BP log in 2 weeks.    SW patient. Went over results, sent new prescription to local pharmacy. Patient would like to have refills sent to mail in if it works.     Patient asked questions regarding Eliquis PAP. Provided patient with [employer] number.     
ESRD (end stage renal disease) on dialysis

## 2025-04-02 NOTE — ED ADULT TRIAGE NOTE - HEIGHT IN INCHES
[General Appearance - Alert] : alert [General Appearance - In No Acute Distress] : in no acute distress [Oriented To Time, Place, And Person] : oriented to person, place, and time [Affect] : the affect was normal [FreeTextEntry1] : anxious 10

## 2025-04-30 NOTE — ED PROCEDURE NOTE - LACERATION NUMBER
Your imaging showed no significant injuries, followup with your PCP, avoid cocaine use, avoid driving while intoxicated, followup with neurology as needed for evaluation for seizures. Return to the ER if you have another seizure, severe headache, loss of consciousness, severe unbearable pain, chest pain, shortness of breath, vomiting or dehydration.     Call the specialists and your PCP listed above to arrange followup    Followup with orthopedic surgery for further evaluation of your chronic shoulder injury, use the sling for comfort  
1

## 2025-06-27 NOTE — ED ADULT TRIAGE NOTE - AS HEIGHT TYPE
Patient aware of need to schedule an appointment with pulmonology as it has been years since pulmonology eval. No current respiratory symptoms.    stated

## 2025-07-07 NOTE — PATIENT PROFILE ADULT - NSPROHMDIABETBLDGLCTST_GEN_A_NUR
before meals and at bedtime [Anxiety] : anxiety [Depression] : no depression [Negative] : Gastrointestinal

## (undated) DEVICE — SUT BIOSYN 4-0 18" P-12

## (undated) DEVICE — STAPLER COVIDIEN ENDO GIA STANDARD HANDLE

## (undated) DEVICE — SYR ALLIANCE II INFLATION 60ML

## (undated) DEVICE — GOWN TRIMAX LG

## (undated) DEVICE — PREP CHLORAPREP HI-LITE ORANGE 26ML

## (undated) DEVICE — POSITIONER FOAM EGG CRATE ULNAR 2PCS (PINK)

## (undated) DEVICE — NDL HYPO SAFE 25G X 1.5" (ORANGE)

## (undated) DEVICE — DRAPE 3/4 SHEET W REINFORCEMENT 56X77"

## (undated) DEVICE — GLV 7.5 PROTEXIS (WHITE)

## (undated) DEVICE — FOLEY TRAY 16FR 5CC LTX UMETER CLOSED

## (undated) DEVICE — TUBING IV SET GRAVITY 3Y 100" MACRO

## (undated) DEVICE — FOLEY CATH 2-WAY 18FR 5CC LATEX HYDROGEL

## (undated) DEVICE — SUT POLYSORB 2-0 27" GS-21

## (undated) DEVICE — VISITEC 4X4

## (undated) DEVICE — CATH IV SAFE BC 22G X 1" (BLUE)

## (undated) DEVICE — APPLICATOR FOR PROGEL EXTENDED SPRAY TIP 29CM

## (undated) DEVICE — MEDICATION LABELS W MARKER

## (undated) DEVICE — FOLEY HOLDER STATLOCK 2 WAY ADULT

## (undated) DEVICE — DRAPE LIGHT HANDLE COVER (BLUE)

## (undated) DEVICE — WARMING BLANKET LOWER ADULT

## (undated) DEVICE — SUT SOFSILK 2-0 30" V-20

## (undated) DEVICE — SUT PDS II 1 54" TP-1

## (undated) DEVICE — GLV 8.5 PROTEXIS (WHITE)

## (undated) DEVICE — PACK MAJOR ABDOMINAL SUPINE

## (undated) DEVICE — STAPLER COVIDIEN ENDO GIA SHORT HANDLE

## (undated) DEVICE — SUT POLYSORB 2 30" GS-26

## (undated) DEVICE — DRAPE MAYO STAND 30"

## (undated) DEVICE — SUT PROLENE 7-0 30" C-1

## (undated) DEVICE — TRAP SPECIMEN SPUTUM 40CC

## (undated) DEVICE — VESSEL LOOP MAXI-RED  0.120" X 16"

## (undated) DEVICE — CATH FOLEY 2 WAY 16FR 5CC LATEX HYDROGEL

## (undated) DEVICE — NDL INJ SCLERO INTERJECT 25G

## (undated) DEVICE — WARMING BLANKET UPPER ADULT

## (undated) DEVICE — SPECIMEN CONTAINER 100ML

## (undated) DEVICE — NDL INJ SCLERO INTERJECT 23G

## (undated) DEVICE — PACK CYSTO

## (undated) DEVICE — GLV 6.5 PROTEXIS (WHITE)

## (undated) DEVICE — Device

## (undated) DEVICE — BLADE SCALPEL SAFETYLOCK #15

## (undated) DEVICE — GLV 7 PROTEXIS (WHITE)

## (undated) DEVICE — SYR LUER LOK 20CC

## (undated) DEVICE — SUT PDS II PLUS 4-0 27" SH

## (undated) DEVICE — ENDOCATCH II 15MM

## (undated) DEVICE — DRAPE GENERAL ENDOSCOPY

## (undated) DEVICE — CATH IV SAFE BC 20G X 1.16" (PINK)

## (undated) DEVICE — SOL IRR POUR H2O 250ML

## (undated) DEVICE — BIOPSY FORCEP RADIAL JAW 4 STANDARD WITH NEEDLE

## (undated) DEVICE — TUBING SUCTION 20FT

## (undated) DEVICE — DRAPE INSTRUMENT POUCH 6.75" X 11"

## (undated) DEVICE — VENODYNE/SCD SLEEVE CALF LARGE

## (undated) DEVICE — ELCTR BOVIE TIP BLADE INSULATED 2.75" EDGE

## (undated) DEVICE — NDL COUNTER FOAM AND MAGNET 40-70

## (undated) DEVICE — ELCTR BOVIE TIP BLADE INSULATED 6.5" EDGE

## (undated) DEVICE — SYR LUER LOK 50CC

## (undated) DEVICE — DISSECTOR ENDO PEANUT 5MM

## (undated) DEVICE — SCOPE WARMER SEAL DISP

## (undated) DEVICE — MARKING PEN W RULER

## (undated) DEVICE — PACK BASIN SPECIAL PROCEDURE

## (undated) DEVICE — SOL IRR POUR NS 0.9% 500ML

## (undated) DEVICE — DRSG SURG OPSITE 10X4"

## (undated) DEVICE — SUT PDS II PLUS 5-0 30" RB-1

## (undated) DEVICE — SENSOR O2 FINGER ADULT 24/CA

## (undated) DEVICE — SUT PROLENE 4-0 36" BB

## (undated) DEVICE — SUCTION YANKAUER NO CONTROL VENT

## (undated) DEVICE — TUBING TRUWAVE PRESSURE MALE/FEMALE 72"

## (undated) DEVICE — NDL BIOPSY TRU-CUT 14G X 6"

## (undated) DEVICE — ACMI SELF-SEALING SEAL UP TO 7FR

## (undated) DEVICE — AORTIC PUNCH 4.8 MM LONG HANDLE

## (undated) DEVICE — TUBE ASPIRATION LUKI

## (undated) DEVICE — TUBING SUCTION CONN 6FT STERILE

## (undated) DEVICE — SUT POLYSORB 0 30" GS-21 UNDYED

## (undated) DEVICE — GLV 8 PROTEXIS (WHITE)

## (undated) DEVICE — DRAIN PENROSE .25" X 18" LATEX

## (undated) DEVICE — BAG URINE W METER 2L

## (undated) DEVICE — ELCTR BOVIE PENCIL HANDPIECE

## (undated) DEVICE — VESSEL LOOP MAXI-BLUE 0.120" X 16"

## (undated) DEVICE — NDL HYPO SAFE 22G X 1.5" (BLACK)

## (undated) DEVICE — DRAIN 24 FR ROUND HUBLESS FULL FLUTED SILICONE

## (undated) DEVICE — STAPLER SKIN VISI-STAT 35 WIDE

## (undated) DEVICE — ENDOCATCH 10MM SPECIMEN POUCH

## (undated) DEVICE — SYR LUER LOK 10CC

## (undated) DEVICE — SUT POLYSORB 3-0 30" V-20 UNDYED

## (undated) DEVICE — SOL ANTI FOG

## (undated) DEVICE — DRAPE TOWEL BLUE 17" X 24"

## (undated) DEVICE — SUT POLYSORB 0 36" GS-25 UNDYED

## (undated) DEVICE — SOL IRR BAG H2O 3000ML

## (undated) DEVICE — DRAPE IOBAN 23" X 23"

## (undated) DEVICE — SUT SOFSILK 0 30" V-20

## (undated) DEVICE — DRAPE EQUIPMENT BANDED BAG 30 X 30" (SHOWER CAP)

## (undated) DEVICE — DRAIN RESERVOIR FOR JACKSON PRATT 100CC CARDINAL

## (undated) DEVICE — BALLOON US ENDO

## (undated) DEVICE — GOWN XL EXTRA LONG

## (undated) DEVICE — PACK ADVANCED LAPAROSCOPIC NS

## (undated) DEVICE — STEALTH CLAMP INSERT SOFT/SOFT 30MM

## (undated) DEVICE — DRSG OPSITE 13.75 X 4"

## (undated) DEVICE — BUR WIRE PASSER MED 1.5MMX 19MM

## (undated) DEVICE — CHEST DRAIN OASIS DRY SUCTION WATER SEAL

## (undated) DEVICE — POSITIONER FOAM HEADREST (PINK)

## (undated) DEVICE — SUT SOFSILK 4-0 18" TIES

## (undated) DEVICE — PACK IV START WITH CHG

## (undated) DEVICE — DRSG TELFA 3 X 8

## (undated) DEVICE — DRAPE 1/2 SHEET 40X57"

## (undated) DEVICE — SOL IRR POUR H2O 1500ML

## (undated) DEVICE — SUT PROLENE 6-0 4-30" C-1

## (undated) DEVICE — DRSG COMBINE 5X9"

## (undated) DEVICE — DRSG STERISTRIPS 0.5 X 4"

## (undated) DEVICE — DRSG TEGADERM 6"X8"

## (undated) DEVICE — BLADE SCALPEL SAFETYLOCK #11

## (undated) DEVICE — SYR ASEPTO

## (undated) DEVICE — PACK BASIC GOWN

## (undated) DEVICE — FORCEP ALLIGATOR 5.2FR X 65CM DISP

## (undated) DEVICE — DRSG TEGADERM 4X4.75"

## (undated) DEVICE — DRAPE SLUSH / WARMER 44 X 66"

## (undated) DEVICE — FORCEP RADIAL JAW 4 JUMBO 2.8MM 3.2MM 240CM ORANGE DISP

## (undated) DEVICE — DRAPE MAGNETIC INSTRUMENT MEDIUM

## (undated) DEVICE — SOL INJ NS 0.9% 500ML 2 PORT

## (undated) DEVICE — SUT SOFSILK 4-0 18" V-20

## (undated) DEVICE — DRAIN CHANNEL 19FR ROUND FULL FLUTED

## (undated) DEVICE — BLADE SCALPEL SAFETYLOCK #10

## (undated) DEVICE — SUT PROLENE 6-0 30" C-1

## (undated) DEVICE — FOLEY TRAY 16FR LF URINE METER SURESTEP

## (undated) DEVICE — BRUSH COLONOSCOPY CYTOLOGY

## (undated) DEVICE — NDL BIOPSY MONOPTY 18G X 20CM

## (undated) DEVICE — DRAIN PLEUROVAC

## (undated) DEVICE — GLV 8 PROTEXIS (CREAM) NEU-THERA

## (undated) DEVICE — BITE BLOCK ADULT 20 X 27MM (GREEN)

## (undated) DEVICE — SUT NYLON 2-0 18" FS

## (undated) DEVICE — SUT BOOT STANDARD (ASSORTED) 5 PAIR

## (undated) DEVICE — SUT SOFSILK 2-0 18" TIES

## (undated) DEVICE — DRSG CURITY GAUZE SPONGE 4 X 4" 12-PLY

## (undated) DEVICE — SUT PDS II 0 27" OS-6

## (undated) DEVICE — D HELP - CLEARVIEW CLEARIFY SYSTEM

## (undated) DEVICE — TAPE SILK 3"

## (undated) DEVICE — BAG DECANTER DISP

## (undated) DEVICE — SUT SURGIPRO 0 30" GS-22